# Patient Record
Sex: FEMALE | Race: BLACK OR AFRICAN AMERICAN | NOT HISPANIC OR LATINO | Employment: UNEMPLOYED | URBAN - METROPOLITAN AREA
[De-identification: names, ages, dates, MRNs, and addresses within clinical notes are randomized per-mention and may not be internally consistent; named-entity substitution may affect disease eponyms.]

---

## 2018-09-12 PROBLEM — N39.0 UTI (URINARY TRACT INFECTION): Status: ACTIVE | Noted: 2018-09-12

## 2018-09-12 PROBLEM — R19.7 DIARRHEA: Status: ACTIVE | Noted: 2018-09-12

## 2018-09-12 PROBLEM — I95.9 HYPOTENSION: Status: ACTIVE | Noted: 2018-09-12

## 2018-09-15 PROBLEM — N13.30 HYDRONEPHROSIS: Status: ACTIVE | Noted: 2018-09-15

## 2018-09-15 PROBLEM — I47.10 SVT (SUPRAVENTRICULAR TACHYCARDIA): Status: ACTIVE | Noted: 2018-09-15

## 2018-09-15 PROBLEM — R10.84 GENERALIZED ABDOMINAL PAIN: Status: ACTIVE | Noted: 2018-09-15

## 2018-09-16 PROBLEM — N12 PYELONEPHRITIS: Status: ACTIVE | Noted: 2018-09-16

## 2019-10-15 PROBLEM — K29.70 GASTRITIS: Status: ACTIVE | Noted: 2019-10-15

## 2019-10-16 PROBLEM — R11.2 INTRACTABLE NAUSEA AND VOMITING: Status: ACTIVE | Noted: 2019-10-16

## 2019-10-16 PROBLEM — E11.9 TYPE II DIABETES MELLITUS: Status: ACTIVE | Noted: 2019-10-16

## 2019-10-16 PROBLEM — K80.20 GALLSTONES: Status: ACTIVE | Noted: 2019-10-16

## 2020-02-27 PROBLEM — R11.10 INTRACTABLE VOMITING: Status: ACTIVE | Noted: 2020-02-27

## 2020-02-28 PROBLEM — E10.65 TYPE 1 DIABETES MELLITUS WITH HYPERGLYCEMIA: Status: ACTIVE | Noted: 2020-02-28

## 2020-03-02 PROBLEM — R17 ELEVATED BILIRUBIN: Status: ACTIVE | Noted: 2020-03-02

## 2020-09-10 PROBLEM — E11.01 HYPEROSMOLAR HYPERGLYCEMIC COMA DUE TO DIABETES MELLITUS WITHOUT KETOACIDOSIS: Status: ACTIVE | Noted: 2020-09-10

## 2020-09-10 PROBLEM — E87.6 HYPOKALEMIA: Status: ACTIVE | Noted: 2020-09-10

## 2020-09-10 PROBLEM — E87.1 HYPONATREMIA: Status: ACTIVE | Noted: 2020-09-10

## 2020-09-11 PROBLEM — B96.89 UTI DUE TO KLEBSIELLA SPECIES: Status: ACTIVE | Noted: 2018-09-12

## 2020-09-15 ENCOUNTER — PATIENT OUTREACH (OUTPATIENT)
Dept: ADMINISTRATIVE | Facility: CLINIC | Age: 30
End: 2020-09-15

## 2020-09-15 NOTE — PROGRESS NOTES
C3 nurse attempted to contact patient. No answer. The following message was left for the patient to return the call:  Good afternoon, I am a nurse calling on behalf of [facility] from the [facility].  This is a Transitional Care Call for Tabby Howard. When you have a moment please contact us at [phone] or [phone] Monday through Friday, between the hours of 8 am to 4 pm. We look forward to speaking with you. On behalf of [facility] have a nice day.    The patient does not have a scheduled HOSFU appointment within 7-14 days post hospital discharge date 9/13.

## 2020-12-13 PROBLEM — E87.5 HYPERKALEMIA: Status: ACTIVE | Noted: 2020-12-13

## 2020-12-14 PROBLEM — E83.39 HYPOPHOSPHATEMIA: Status: ACTIVE | Noted: 2020-12-14

## 2020-12-14 PROBLEM — N39.0 UTI (URINARY TRACT INFECTION): Status: ACTIVE | Noted: 2020-12-14

## 2020-12-14 PROBLEM — N17.9 AKI (ACUTE KIDNEY INJURY): Status: ACTIVE | Noted: 2020-12-14

## 2020-12-16 ENCOUNTER — PATIENT OUTREACH (OUTPATIENT)
Dept: ADMINISTRATIVE | Facility: CLINIC | Age: 30
End: 2020-12-16

## 2020-12-16 NOTE — PROGRESS NOTES
C3 nurse attempted to contact patient. No answer. The following message was left for the patient to return the call:  Good morning. I am a nurse calling on behalf of [facility] from the [facility].  This is a Transitional Care Call for Tabby Howard. When you have a moment please contact us at [phone] or [phone] Monday through Friday, between the hours of 8 am to 4 pm. We look forward to speaking with you. On behalf of [facility] have a nice day.    The patient does not have a scheduled HOSFU appointment within 7-14 days post hospital discharge date 12/15/2020. No PCP listed.

## 2021-04-23 ENCOUNTER — HOSPITAL ENCOUNTER (INPATIENT)
Facility: HOSPITAL | Age: 31
LOS: 3 days | Discharge: HOME | DRG: 638 | End: 2021-04-26
Attending: EMERGENCY MEDICINE | Admitting: EMERGENCY MEDICINE
Payer: MEDICAID

## 2021-04-23 DIAGNOSIS — N13.30 HYDRONEPHROSIS OF LEFT KIDNEY: ICD-10-CM

## 2021-04-23 DIAGNOSIS — E11.10 DIABETIC KETOACIDOSIS WITHOUT COMA ASSOCIATED WITH TYPE 2 DIABETES MELLITUS: Primary | ICD-10-CM

## 2021-04-23 DIAGNOSIS — K92.0 HEMATEMESIS, PRESENCE OF NAUSEA NOT SPECIFIED: ICD-10-CM

## 2021-04-23 DIAGNOSIS — R11.2 INTRACTABLE VOMITING WITH NAUSEA, UNSPECIFIED VOMITING TYPE: ICD-10-CM

## 2021-04-23 DIAGNOSIS — R73.9 HYPERGLYCEMIA: ICD-10-CM

## 2021-04-23 PROBLEM — R31.9 HEMATURIA: Status: ACTIVE | Noted: 2021-04-23

## 2021-04-23 LAB
ALBUMIN SERPL BCP-MCNC: 3.2 G/DL (ref 3.5–5.2)
ALLENS TEST: ABNORMAL
ALP SERPL-CCNC: 79 U/L (ref 55–135)
ALT SERPL W/O P-5'-P-CCNC: 15 U/L (ref 10–44)
ANION GAP SERPL CALC-SCNC: 17 MMOL/L (ref 8–16)
AST SERPL-CCNC: 18 U/L (ref 10–40)
B-OH-BUTYR BLD STRIP-SCNC: 6 MMOL/L (ref 0–0.5)
BACTERIA #/AREA URNS AUTO: ABNORMAL /HPF
BASOPHILS # BLD AUTO: 0.04 K/UL (ref 0–0.2)
BASOPHILS NFR BLD: 0.3 % (ref 0–1.9)
BILIRUB SERPL-MCNC: 3 MG/DL (ref 0.1–1)
BILIRUB UR QL STRIP: NEGATIVE
BUN SERPL-MCNC: 20 MG/DL (ref 6–20)
CALCIUM SERPL-MCNC: 9.1 MG/DL (ref 8.7–10.5)
CHLORIDE SERPL-SCNC: 96 MMOL/L (ref 95–110)
CLARITY UR REFRACT.AUTO: CLEAR
CO2 SERPL-SCNC: 21 MMOL/L (ref 23–29)
COLOR UR AUTO: YELLOW
CREAT SERPL-MCNC: 1.4 MG/DL (ref 0.5–1.4)
CTP QC/QA: YES
DIFFERENTIAL METHOD: ABNORMAL
EOSINOPHIL # BLD AUTO: 0 K/UL (ref 0–0.5)
EOSINOPHIL NFR BLD: 0 % (ref 0–8)
ERYTHROCYTE [DISTWIDTH] IN BLOOD BY AUTOMATED COUNT: 13 % (ref 11.5–14.5)
EST. GFR  (AFRICAN AMERICAN): 57.4 ML/MIN/1.73 M^2
EST. GFR  (NON AFRICAN AMERICAN): 49.8 ML/MIN/1.73 M^2
GLUCOSE SERPL-MCNC: 535 MG/DL (ref 70–110)
GLUCOSE UR QL STRIP: ABNORMAL
HCO3 UR-SCNC: 23.3 MMOL/L (ref 24–28)
HCT VFR BLD AUTO: 28.1 % (ref 37–48.5)
HGB BLD-MCNC: 10.1 G/DL (ref 12–16)
HGB UR QL STRIP: ABNORMAL
HYALINE CASTS UR QL AUTO: 0 /LPF
IMM GRANULOCYTES # BLD AUTO: 0.07 K/UL (ref 0–0.04)
IMM GRANULOCYTES NFR BLD AUTO: 0.5 % (ref 0–0.5)
KETONES UR QL STRIP: ABNORMAL
LACTATE SERPL-SCNC: 1.8 MMOL/L (ref 0.5–2.2)
LEUKOCYTE ESTERASE UR QL STRIP: ABNORMAL
LIPASE SERPL-CCNC: 14 U/L (ref 4–60)
LYMPHOCYTES # BLD AUTO: 1.3 K/UL (ref 1–4.8)
LYMPHOCYTES NFR BLD: 10 % (ref 18–48)
MCH RBC QN AUTO: 30.3 PG (ref 27–31)
MCHC RBC AUTO-ENTMCNC: 35.9 G/DL (ref 32–36)
MCV RBC AUTO: 84 FL (ref 82–98)
MICROSCOPIC COMMENT: ABNORMAL
MONOCYTES # BLD AUTO: 0.5 K/UL (ref 0.3–1)
MONOCYTES NFR BLD: 4.1 % (ref 4–15)
NEUTROPHILS # BLD AUTO: 11.1 K/UL (ref 1.8–7.7)
NEUTROPHILS NFR BLD: 85.1 % (ref 38–73)
NITRITE UR QL STRIP: NEGATIVE
NRBC BLD-RTO: 0 /100 WBC
PCO2 BLDA: 32.9 MMHG (ref 35–45)
PH SMN: 7.46 [PH] (ref 7.35–7.45)
PH UR STRIP: 6 [PH] (ref 5–8)
PLATELET # BLD AUTO: 183 K/UL (ref 150–450)
PMV BLD AUTO: 10.8 FL (ref 9.2–12.9)
PO2 BLDA: 24 MMHG (ref 40–60)
POC BE: -1 MMOL/L
POC SATURATED O2: 47 % (ref 95–100)
POC TCO2: 24 MMOL/L (ref 24–29)
POCT GLUCOSE: 359 MG/DL (ref 70–110)
POCT GLUCOSE: 468 MG/DL (ref 70–110)
POCT GLUCOSE: >500 MG/DL (ref 70–110)
POTASSIUM SERPL-SCNC: 4.3 MMOL/L (ref 3.5–5.1)
PROT SERPL-MCNC: 6.5 G/DL (ref 6–8.4)
PROT UR QL STRIP: ABNORMAL
RBC # BLD AUTO: 3.33 M/UL (ref 4–5.4)
RBC #/AREA URNS AUTO: 54 /HPF (ref 0–4)
SAMPLE: ABNORMAL
SARS-COV-2 RDRP RESP QL NAA+PROBE: NEGATIVE
SITE: ABNORMAL
SODIUM SERPL-SCNC: 134 MMOL/L (ref 136–145)
SP GR UR STRIP: 1.02 (ref 1–1.03)
TROPONIN I SERPL DL<=0.01 NG/ML-MCNC: 0.01 NG/ML (ref 0–0.03)
URN SPEC COLLECT METH UR: ABNORMAL
WBC # BLD AUTO: 13.03 K/UL (ref 3.9–12.7)
WBC #/AREA URNS AUTO: 44 /HPF (ref 0–5)
YEAST UR QL AUTO: ABNORMAL

## 2021-04-23 PROCEDURE — 20600001 HC STEP DOWN PRIVATE ROOM

## 2021-04-23 PROCEDURE — 83605 ASSAY OF LACTIC ACID: CPT | Performed by: EMERGENCY MEDICINE

## 2021-04-23 PROCEDURE — 82800 BLOOD PH: CPT

## 2021-04-23 PROCEDURE — 25000003 PHARM REV CODE 250: Performed by: HOSPITALIST

## 2021-04-23 PROCEDURE — 25000003 PHARM REV CODE 250: Performed by: STUDENT IN AN ORGANIZED HEALTH CARE EDUCATION/TRAINING PROGRAM

## 2021-04-23 PROCEDURE — 80307 DRUG TEST PRSMV CHEM ANLYZR: CPT | Performed by: STUDENT IN AN ORGANIZED HEALTH CARE EDUCATION/TRAINING PROGRAM

## 2021-04-23 PROCEDURE — 83690 ASSAY OF LIPASE: CPT | Performed by: STUDENT IN AN ORGANIZED HEALTH CARE EDUCATION/TRAINING PROGRAM

## 2021-04-23 PROCEDURE — 80047 BASIC METABLC PNL IONIZED CA: CPT

## 2021-04-23 PROCEDURE — U0002 COVID-19 LAB TEST NON-CDC: HCPCS | Performed by: STUDENT IN AN ORGANIZED HEALTH CARE EDUCATION/TRAINING PROGRAM

## 2021-04-23 PROCEDURE — 86803 HEPATITIS C AB TEST: CPT | Performed by: EMERGENCY MEDICINE

## 2021-04-23 PROCEDURE — 99223 1ST HOSP IP/OBS HIGH 75: CPT | Mod: ,,, | Performed by: PHYSICIAN ASSISTANT

## 2021-04-23 PROCEDURE — 93005 ELECTROCARDIOGRAM TRACING: CPT

## 2021-04-23 PROCEDURE — 99900035 HC TECH TIME PER 15 MIN (STAT)

## 2021-04-23 PROCEDURE — 63600175 PHARM REV CODE 636 W HCPCS: Performed by: STUDENT IN AN ORGANIZED HEALTH CARE EDUCATION/TRAINING PROGRAM

## 2021-04-23 PROCEDURE — C9113 INJ PANTOPRAZOLE SODIUM, VIA: HCPCS | Performed by: STUDENT IN AN ORGANIZED HEALTH CARE EDUCATION/TRAINING PROGRAM

## 2021-04-23 PROCEDURE — 96375 TX/PRO/DX INJ NEW DRUG ADDON: CPT

## 2021-04-23 PROCEDURE — 86703 HIV-1/HIV-2 1 RESULT ANTBDY: CPT | Performed by: EMERGENCY MEDICINE

## 2021-04-23 PROCEDURE — 99285 EMERGENCY DEPT VISIT HI MDM: CPT | Mod: CR,CS,, | Performed by: EMERGENCY MEDICINE

## 2021-04-23 PROCEDURE — 85025 COMPLETE CBC W/AUTO DIFF WBC: CPT | Performed by: STUDENT IN AN ORGANIZED HEALTH CARE EDUCATION/TRAINING PROGRAM

## 2021-04-23 PROCEDURE — 84484 ASSAY OF TROPONIN QUANT: CPT | Performed by: EMERGENCY MEDICINE

## 2021-04-23 PROCEDURE — P9612 CATHETERIZE FOR URINE SPEC: HCPCS

## 2021-04-23 PROCEDURE — 96365 THER/PROPH/DIAG IV INF INIT: CPT

## 2021-04-23 PROCEDURE — 96361 HYDRATE IV INFUSION ADD-ON: CPT

## 2021-04-23 PROCEDURE — 82248 BILIRUBIN DIRECT: CPT | Performed by: STUDENT IN AN ORGANIZED HEALTH CARE EDUCATION/TRAINING PROGRAM

## 2021-04-23 PROCEDURE — 80053 COMPREHEN METABOLIC PANEL: CPT | Performed by: STUDENT IN AN ORGANIZED HEALTH CARE EDUCATION/TRAINING PROGRAM

## 2021-04-23 PROCEDURE — 84300 ASSAY OF URINE SODIUM: CPT | Performed by: STUDENT IN AN ORGANIZED HEALTH CARE EDUCATION/TRAINING PROGRAM

## 2021-04-23 PROCEDURE — 93010 ELECTROCARDIOGRAM REPORT: CPT | Mod: ,,, | Performed by: STUDENT IN AN ORGANIZED HEALTH CARE EDUCATION/TRAINING PROGRAM

## 2021-04-23 PROCEDURE — 82010 KETONE BODYS QUAN: CPT | Performed by: STUDENT IN AN ORGANIZED HEALTH CARE EDUCATION/TRAINING PROGRAM

## 2021-04-23 PROCEDURE — 82803 BLOOD GASES ANY COMBINATION: CPT

## 2021-04-23 PROCEDURE — 93010 EKG 12-LEAD: ICD-10-PCS | Mod: ,,, | Performed by: STUDENT IN AN ORGANIZED HEALTH CARE EDUCATION/TRAINING PROGRAM

## 2021-04-23 PROCEDURE — 99285 EMERGENCY DEPT VISIT HI MDM: CPT | Mod: 25

## 2021-04-23 PROCEDURE — 82962 GLUCOSE BLOOD TEST: CPT

## 2021-04-23 PROCEDURE — 99285 PR EMERGENCY DEPT VISIT,LEVEL V: ICD-10-PCS | Mod: CR,CS,, | Performed by: EMERGENCY MEDICINE

## 2021-04-23 PROCEDURE — 99223 PR INITIAL HOSPITAL CARE,LEVL III: ICD-10-PCS | Mod: ,,, | Performed by: PHYSICIAN ASSISTANT

## 2021-04-23 PROCEDURE — 87086 URINE CULTURE/COLONY COUNT: CPT | Performed by: STUDENT IN AN ORGANIZED HEALTH CARE EDUCATION/TRAINING PROGRAM

## 2021-04-23 PROCEDURE — 81001 URINALYSIS AUTO W/SCOPE: CPT | Performed by: STUDENT IN AN ORGANIZED HEALTH CARE EDUCATION/TRAINING PROGRAM

## 2021-04-23 RX ORDER — IPRATROPIUM BROMIDE AND ALBUTEROL SULFATE 2.5; .5 MG/3ML; MG/3ML
3 SOLUTION RESPIRATORY (INHALATION) EVERY 4 HOURS PRN
Status: DISCONTINUED | OUTPATIENT
Start: 2021-04-24 | End: 2021-04-26 | Stop reason: HOSPADM

## 2021-04-23 RX ORDER — PANTOPRAZOLE SODIUM 40 MG/10ML
40 INJECTION, POWDER, LYOPHILIZED, FOR SOLUTION INTRAVENOUS
Status: COMPLETED | OUTPATIENT
Start: 2021-04-23 | End: 2021-04-23

## 2021-04-23 RX ORDER — ONDANSETRON 2 MG/ML
8 INJECTION INTRAMUSCULAR; INTRAVENOUS EVERY 8 HOURS PRN
Status: DISCONTINUED | OUTPATIENT
Start: 2021-04-24 | End: 2021-04-26 | Stop reason: HOSPADM

## 2021-04-23 RX ORDER — PANTOPRAZOLE SODIUM 40 MG/1
40 TABLET, DELAYED RELEASE ORAL DAILY
Status: DISCONTINUED | OUTPATIENT
Start: 2021-04-24 | End: 2021-04-26 | Stop reason: HOSPADM

## 2021-04-23 RX ORDER — POLYETHYLENE GLYCOL 3350 17 G/17G
17 POWDER, FOR SOLUTION ORAL 2 TIMES DAILY PRN
Status: DISCONTINUED | OUTPATIENT
Start: 2021-04-24 | End: 2021-04-26 | Stop reason: HOSPADM

## 2021-04-23 RX ORDER — BISACODYL 10 MG
10 SUPPOSITORY, RECTAL RECTAL DAILY PRN
Status: DISCONTINUED | OUTPATIENT
Start: 2021-04-24 | End: 2021-04-26 | Stop reason: HOSPADM

## 2021-04-23 RX ORDER — SODIUM CHLORIDE 9 MG/ML
INJECTION, SOLUTION INTRAVENOUS CONTINUOUS
Status: DISCONTINUED | OUTPATIENT
Start: 2021-04-23 | End: 2021-04-26 | Stop reason: HOSPADM

## 2021-04-23 RX ORDER — HYDRALAZINE HYDROCHLORIDE 25 MG/1
25 TABLET, FILM COATED ORAL EVERY 6 HOURS PRN
Status: DISCONTINUED | OUTPATIENT
Start: 2021-04-23 | End: 2021-04-26 | Stop reason: HOSPADM

## 2021-04-23 RX ORDER — ACETAMINOPHEN 325 MG/1
650 TABLET ORAL EVERY 4 HOURS PRN
Status: DISCONTINUED | OUTPATIENT
Start: 2021-04-24 | End: 2021-04-24

## 2021-04-23 RX ORDER — DEXTROSE MONOHYDRATE 100 MG/ML
INJECTION, SOLUTION INTRAVENOUS
Status: DISCONTINUED | OUTPATIENT
Start: 2021-04-23 | End: 2021-04-26 | Stop reason: HOSPADM

## 2021-04-23 RX ORDER — SODIUM CHLORIDE 0.9 % (FLUSH) 0.9 %
10 SYRINGE (ML) INJECTION
Status: CANCELLED | OUTPATIENT
Start: 2021-04-23

## 2021-04-23 RX ORDER — OXYCODONE HYDROCHLORIDE 5 MG/1
5 TABLET ORAL ONCE
Status: COMPLETED | OUTPATIENT
Start: 2021-04-24 | End: 2021-04-23

## 2021-04-23 RX ORDER — TALC
6 POWDER (GRAM) TOPICAL NIGHTLY PRN
Status: CANCELLED | OUTPATIENT
Start: 2021-04-23

## 2021-04-23 RX ORDER — TALC
9 POWDER (GRAM) TOPICAL NIGHTLY PRN
Status: DISCONTINUED | OUTPATIENT
Start: 2021-04-24 | End: 2021-04-26 | Stop reason: HOSPADM

## 2021-04-23 RX ORDER — ONDANSETRON 2 MG/ML
4 INJECTION INTRAMUSCULAR; INTRAVENOUS EVERY 8 HOURS PRN
Status: DISCONTINUED | OUTPATIENT
Start: 2021-04-24 | End: 2021-04-23

## 2021-04-23 RX ORDER — ACETAMINOPHEN 325 MG/1
650 TABLET ORAL EVERY 6 HOURS PRN
Status: DISCONTINUED | OUTPATIENT
Start: 2021-04-23 | End: 2021-04-26 | Stop reason: HOSPADM

## 2021-04-23 RX ORDER — SODIUM CHLORIDE 0.9 % (FLUSH) 0.9 %
10 SYRINGE (ML) INJECTION
Status: DISCONTINUED | OUTPATIENT
Start: 2021-04-23 | End: 2021-04-26 | Stop reason: HOSPADM

## 2021-04-23 RX ORDER — SODIUM CHLORIDE AND POTASSIUM CHLORIDE 150; 900 MG/100ML; MG/100ML
INJECTION, SOLUTION INTRAVENOUS
Status: COMPLETED | OUTPATIENT
Start: 2021-04-23 | End: 2021-04-23

## 2021-04-23 RX ORDER — ONDANSETRON 2 MG/ML
4 INJECTION INTRAMUSCULAR; INTRAVENOUS
Status: COMPLETED | OUTPATIENT
Start: 2021-04-23 | End: 2021-04-23

## 2021-04-23 RX ADMIN — ACETAMINOPHEN 650 MG: 325 TABLET ORAL at 10:04

## 2021-04-23 RX ADMIN — OXYCODONE 5 MG: 5 TABLET ORAL at 10:04

## 2021-04-23 RX ADMIN — SODIUM CHLORIDE 150 ML/HR: 0.9 INJECTION, SOLUTION INTRAVENOUS at 11:04

## 2021-04-23 RX ADMIN — SODIUM CHLORIDE AND POTASSIUM CHLORIDE: .9; .15 SOLUTION INTRAVENOUS at 08:04

## 2021-04-23 RX ADMIN — SODIUM CHLORIDE 3 UNITS/HR: 9 INJECTION, SOLUTION INTRAVENOUS at 08:04

## 2021-04-23 RX ADMIN — PANTOPRAZOLE SODIUM 40 MG: 40 INJECTION, POWDER, FOR SOLUTION INTRAVENOUS at 07:04

## 2021-04-23 RX ADMIN — ONDANSETRON 4 MG: 2 INJECTION INTRAMUSCULAR; INTRAVENOUS at 07:04

## 2021-04-23 RX ADMIN — SODIUM CHLORIDE 1000 ML: 0.9 INJECTION, SOLUTION INTRAVENOUS at 07:04

## 2021-04-24 PROBLEM — I16.0 HYPERTENSIVE URGENCY: Status: ACTIVE | Noted: 2021-04-24

## 2021-04-24 PROBLEM — D64.9 ANEMIA: Status: ACTIVE | Noted: 2021-04-24

## 2021-04-24 LAB
ABO + RH BLD: NORMAL
ALBUMIN SERPL BCP-MCNC: 2.8 G/DL (ref 3.5–5.2)
ALP SERPL-CCNC: 78 U/L (ref 55–135)
ALT SERPL W/O P-5'-P-CCNC: 15 U/L (ref 10–44)
AMPHET+METHAMPHET UR QL: NEGATIVE
ANION GAP SERPL CALC-SCNC: 10 MMOL/L (ref 8–16)
ANION GAP SERPL CALC-SCNC: 5 MMOL/L (ref 8–16)
ANION GAP SERPL CALC-SCNC: 8 MMOL/L (ref 8–16)
ANION GAP SERPL CALC-SCNC: 8 MMOL/L (ref 8–16)
ANION GAP SERPL CALC-SCNC: 9 MMOL/L (ref 8–16)
AST SERPL-CCNC: 18 U/L (ref 10–40)
BACTERIA #/AREA URNS AUTO: NORMAL /HPF
BARBITURATES UR QL SCN>200 NG/ML: NEGATIVE
BASOPHILS # BLD AUTO: 0.03 K/UL (ref 0–0.2)
BASOPHILS NFR BLD: 0.3 % (ref 0–1.9)
BENZODIAZ UR QL SCN>200 NG/ML: NEGATIVE
BILIRUB DIRECT SERPL-MCNC: 0.8 MG/DL (ref 0.1–0.3)
BILIRUB SERPL-MCNC: 2.4 MG/DL (ref 0.1–1)
BILIRUB UR QL STRIP: NEGATIVE
BLD GP AB SCN CELLS X3 SERPL QL: NORMAL
BUN SERPL-MCNC: 10 MG/DL (ref 6–20)
BUN SERPL-MCNC: 11 MG/DL (ref 6–20)
BUN SERPL-MCNC: 13 MG/DL (ref 6–20)
BUN SERPL-MCNC: 14 MG/DL (ref 6–20)
BUN SERPL-MCNC: 15 MG/DL (ref 6–20)
BZE UR QL SCN: NEGATIVE
CALCIUM SERPL-MCNC: 7.8 MG/DL (ref 8.7–10.5)
CALCIUM SERPL-MCNC: 8.2 MG/DL (ref 8.7–10.5)
CALCIUM SERPL-MCNC: 8.3 MG/DL (ref 8.7–10.5)
CALCIUM SERPL-MCNC: 8.4 MG/DL (ref 8.7–10.5)
CALCIUM SERPL-MCNC: 8.4 MG/DL (ref 8.7–10.5)
CALCIUM SERPL-MCNC: 8.5 MG/DL (ref 8.7–10.5)
CALCIUM SERPL-MCNC: 8.6 MG/DL (ref 8.7–10.5)
CANNABINOIDS UR QL SCN: NORMAL
CHLORIDE SERPL-SCNC: 105 MMOL/L (ref 95–110)
CHLORIDE SERPL-SCNC: 105 MMOL/L (ref 95–110)
CHLORIDE SERPL-SCNC: 106 MMOL/L (ref 95–110)
CHLORIDE SERPL-SCNC: 106 MMOL/L (ref 95–110)
CHLORIDE SERPL-SCNC: 107 MMOL/L (ref 95–110)
CHLORIDE SERPL-SCNC: 107 MMOL/L (ref 95–110)
CHLORIDE SERPL-SCNC: 108 MMOL/L (ref 95–110)
CLARITY UR REFRACT.AUTO: CLEAR
CO2 SERPL-SCNC: 22 MMOL/L (ref 23–29)
CO2 SERPL-SCNC: 23 MMOL/L (ref 23–29)
CO2 SERPL-SCNC: 24 MMOL/L (ref 23–29)
CO2 SERPL-SCNC: 26 MMOL/L (ref 23–29)
CO2 SERPL-SCNC: 26 MMOL/L (ref 23–29)
CO2 SERPL-SCNC: 27 MMOL/L (ref 23–29)
CO2 SERPL-SCNC: 27 MMOL/L (ref 23–29)
COLOR UR AUTO: YELLOW
CREAT SERPL-MCNC: 0.8 MG/DL (ref 0.5–1.4)
CREAT SERPL-MCNC: 0.8 MG/DL (ref 0.5–1.4)
CREAT SERPL-MCNC: 0.9 MG/DL (ref 0.5–1.4)
CREAT SERPL-MCNC: 1 MG/DL (ref 0.5–1.4)
CREAT UR-MCNC: 46 MG/DL (ref 15–325)
DIFFERENTIAL METHOD: ABNORMAL
EOSINOPHIL # BLD AUTO: 0 K/UL (ref 0–0.5)
EOSINOPHIL NFR BLD: 0.2 % (ref 0–8)
ERYTHROCYTE [DISTWIDTH] IN BLOOD BY AUTOMATED COUNT: 12.9 % (ref 11.5–14.5)
EST. GFR  (AFRICAN AMERICAN): >60 ML/MIN/1.73 M^2
EST. GFR  (NON AFRICAN AMERICAN): >60 ML/MIN/1.73 M^2
ESTIMATED AVG GLUCOSE: 197 MG/DL (ref 68–131)
GLUCOSE SERPL-MCNC: 110 MG/DL (ref 70–110)
GLUCOSE SERPL-MCNC: 150 MG/DL (ref 70–110)
GLUCOSE SERPL-MCNC: 164 MG/DL (ref 70–110)
GLUCOSE SERPL-MCNC: 242 MG/DL (ref 70–110)
GLUCOSE SERPL-MCNC: 248 MG/DL (ref 70–110)
GLUCOSE SERPL-MCNC: 249 MG/DL (ref 70–110)
GLUCOSE SERPL-MCNC: 252 MG/DL (ref 70–110)
GLUCOSE UR QL STRIP: ABNORMAL
HBA1C MFR BLD: 8.5 % (ref 4–5.6)
HCT VFR BLD AUTO: 26 % (ref 37–48.5)
HCT VFR BLD AUTO: 26.8 % (ref 37–48.5)
HGB BLD-MCNC: 9.5 G/DL (ref 12–16)
HGB BLD-MCNC: 9.6 G/DL (ref 12–16)
HGB UR QL STRIP: ABNORMAL
HYALINE CASTS UR QL AUTO: 0 /LPF
IMM GRANULOCYTES # BLD AUTO: 0.03 K/UL (ref 0–0.04)
IMM GRANULOCYTES NFR BLD AUTO: 0.3 % (ref 0–0.5)
KETONES UR QL STRIP: ABNORMAL
LACTATE SERPL-SCNC: 1.4 MMOL/L (ref 0.5–2.2)
LEUKOCYTE ESTERASE UR QL STRIP: ABNORMAL
LIPASE SERPL-CCNC: 17 U/L (ref 4–60)
LYMPHOCYTES # BLD AUTO: 2 K/UL (ref 1–4.8)
LYMPHOCYTES NFR BLD: 17.7 % (ref 18–48)
MAGNESIUM SERPL-MCNC: 1.7 MG/DL (ref 1.6–2.6)
MCH RBC QN AUTO: 29.9 PG (ref 27–31)
MCHC RBC AUTO-ENTMCNC: 35.8 G/DL (ref 32–36)
MCV RBC AUTO: 84 FL (ref 82–98)
METHADONE UR QL SCN>300 NG/ML: NEGATIVE
MICROSCOPIC COMMENT: NORMAL
MONOCYTES # BLD AUTO: 0.5 K/UL (ref 0.3–1)
MONOCYTES NFR BLD: 4.9 % (ref 4–15)
NEUTROPHILS # BLD AUTO: 8.5 K/UL (ref 1.8–7.7)
NEUTROPHILS NFR BLD: 76.6 % (ref 38–73)
NITRITE UR QL STRIP: NEGATIVE
NRBC BLD-RTO: 0 /100 WBC
OPIATES UR QL SCN: NEGATIVE
PCP UR QL SCN>25 NG/ML: NEGATIVE
PH UR STRIP: 6 [PH] (ref 5–8)
PHOSPHATE SERPL-MCNC: 2.7 MG/DL (ref 2.7–4.5)
PLATELET # BLD AUTO: 168 K/UL (ref 150–450)
PMV BLD AUTO: 10.2 FL (ref 9.2–12.9)
POCT GLUCOSE: 151 MG/DL (ref 70–110)
POCT GLUCOSE: 165 MG/DL (ref 70–110)
POCT GLUCOSE: 186 MG/DL (ref 70–110)
POCT GLUCOSE: 199 MG/DL (ref 70–110)
POCT GLUCOSE: 207 MG/DL (ref 70–110)
POCT GLUCOSE: 212 MG/DL (ref 70–110)
POCT GLUCOSE: 217 MG/DL (ref 70–110)
POCT GLUCOSE: 234 MG/DL (ref 70–110)
POCT GLUCOSE: 235 MG/DL (ref 70–110)
POCT GLUCOSE: 248 MG/DL (ref 70–110)
POCT GLUCOSE: 292 MG/DL (ref 70–110)
POCT GLUCOSE: 312 MG/DL (ref 70–110)
POCT GLUCOSE: 396 MG/DL (ref 70–110)
POTASSIUM SERPL-SCNC: 3.5 MMOL/L (ref 3.5–5.1)
POTASSIUM SERPL-SCNC: 3.7 MMOL/L (ref 3.5–5.1)
POTASSIUM SERPL-SCNC: 3.8 MMOL/L (ref 3.5–5.1)
POTASSIUM SERPL-SCNC: 4 MMOL/L (ref 3.5–5.1)
POTASSIUM SERPL-SCNC: 4.3 MMOL/L (ref 3.5–5.1)
PROT SERPL-MCNC: 5.9 G/DL (ref 6–8.4)
PROT UR QL STRIP: ABNORMAL
RBC # BLD AUTO: 3.21 M/UL (ref 4–5.4)
RBC #/AREA URNS AUTO: 2 /HPF (ref 0–4)
SODIUM SERPL-SCNC: 138 MMOL/L (ref 136–145)
SODIUM SERPL-SCNC: 138 MMOL/L (ref 136–145)
SODIUM SERPL-SCNC: 140 MMOL/L (ref 136–145)
SODIUM SERPL-SCNC: 141 MMOL/L (ref 136–145)
SODIUM UR-SCNC: 40 MMOL/L (ref 20–250)
SP GR UR STRIP: 1.01 (ref 1–1.03)
SQUAMOUS #/AREA URNS AUTO: 0 /HPF
TOXICOLOGY INFORMATION: NORMAL
URN SPEC COLLECT METH UR: ABNORMAL
WBC # BLD AUTO: 11.02 K/UL (ref 3.9–12.7)
WBC #/AREA URNS AUTO: 5 /HPF (ref 0–5)
YEAST UR QL AUTO: NORMAL

## 2021-04-24 PROCEDURE — 99233 PR SUBSEQUENT HOSPITAL CARE,LEVL III: ICD-10-PCS | Mod: ,,, | Performed by: HOSPITALIST

## 2021-04-24 PROCEDURE — 85018 HEMOGLOBIN: CPT | Performed by: PHYSICIAN ASSISTANT

## 2021-04-24 PROCEDURE — 88112 CYTOPATH CELL ENHANCE TECH: CPT | Performed by: PATHOLOGY

## 2021-04-24 PROCEDURE — 88112 CYTOPATH CELL ENHANCE TECH: CPT | Mod: 26,,, | Performed by: PATHOLOGY

## 2021-04-24 PROCEDURE — 80053 COMPREHEN METABOLIC PANEL: CPT | Performed by: PHYSICIAN ASSISTANT

## 2021-04-24 PROCEDURE — 25000003 PHARM REV CODE 250: Performed by: HOSPITALIST

## 2021-04-24 PROCEDURE — 85025 COMPLETE CBC W/AUTO DIFF WBC: CPT | Performed by: PHYSICIAN ASSISTANT

## 2021-04-24 PROCEDURE — 63600175 PHARM REV CODE 636 W HCPCS: Performed by: HOSPITALIST

## 2021-04-24 PROCEDURE — C9399 UNCLASSIFIED DRUGS OR BIOLOG: HCPCS | Performed by: HOSPITALIST

## 2021-04-24 PROCEDURE — 63600175 PHARM REV CODE 636 W HCPCS: Performed by: STUDENT IN AN ORGANIZED HEALTH CARE EDUCATION/TRAINING PROGRAM

## 2021-04-24 PROCEDURE — 25500020 PHARM REV CODE 255: Performed by: HOSPITALIST

## 2021-04-24 PROCEDURE — 83036 HEMOGLOBIN GLYCOSYLATED A1C: CPT | Performed by: PHYSICIAN ASSISTANT

## 2021-04-24 PROCEDURE — 86900 BLOOD TYPING SEROLOGIC ABO: CPT | Performed by: PHYSICIAN ASSISTANT

## 2021-04-24 PROCEDURE — 81001 URINALYSIS AUTO W/SCOPE: CPT | Performed by: HOSPITALIST

## 2021-04-24 PROCEDURE — 25000003 PHARM REV CODE 250: Performed by: PHYSICIAN ASSISTANT

## 2021-04-24 PROCEDURE — 36415 COLL VENOUS BLD VENIPUNCTURE: CPT | Performed by: HOSPITALIST

## 2021-04-24 PROCEDURE — 84100 ASSAY OF PHOSPHORUS: CPT | Performed by: PHYSICIAN ASSISTANT

## 2021-04-24 PROCEDURE — 88112 PR  CYTOPATH, CELL ENHANCE TECH: ICD-10-PCS | Mod: 26,,, | Performed by: PATHOLOGY

## 2021-04-24 PROCEDURE — 99233 SBSQ HOSP IP/OBS HIGH 50: CPT | Mod: ,,, | Performed by: HOSPITALIST

## 2021-04-24 PROCEDURE — 85014 HEMATOCRIT: CPT | Performed by: PHYSICIAN ASSISTANT

## 2021-04-24 PROCEDURE — 20600001 HC STEP DOWN PRIVATE ROOM

## 2021-04-24 PROCEDURE — 25000003 PHARM REV CODE 250: Performed by: STUDENT IN AN ORGANIZED HEALTH CARE EDUCATION/TRAINING PROGRAM

## 2021-04-24 PROCEDURE — 87040 BLOOD CULTURE FOR BACTERIA: CPT | Performed by: HOSPITALIST

## 2021-04-24 PROCEDURE — 36415 COLL VENOUS BLD VENIPUNCTURE: CPT | Performed by: PHYSICIAN ASSISTANT

## 2021-04-24 PROCEDURE — 83690 ASSAY OF LIPASE: CPT | Performed by: HOSPITALIST

## 2021-04-24 PROCEDURE — 83735 ASSAY OF MAGNESIUM: CPT | Performed by: PHYSICIAN ASSISTANT

## 2021-04-24 PROCEDURE — 80048 BASIC METABOLIC PNL TOTAL CA: CPT | Mod: 91 | Performed by: PHYSICIAN ASSISTANT

## 2021-04-24 PROCEDURE — 80048 BASIC METABOLIC PNL TOTAL CA: CPT | Performed by: PHYSICIAN ASSISTANT

## 2021-04-24 PROCEDURE — 83605 ASSAY OF LACTIC ACID: CPT | Performed by: HOSPITALIST

## 2021-04-24 RX ORDER — MORPHINE SULFATE 2 MG/ML
2 INJECTION, SOLUTION INTRAMUSCULAR; INTRAVENOUS EVERY 4 HOURS PRN
Status: DISCONTINUED | OUTPATIENT
Start: 2021-04-24 | End: 2021-04-26 | Stop reason: HOSPADM

## 2021-04-24 RX ORDER — MAGNESIUM SULFATE HEPTAHYDRATE 40 MG/ML
2 INJECTION, SOLUTION INTRAVENOUS ONCE
Status: COMPLETED | OUTPATIENT
Start: 2021-04-24 | End: 2021-04-24

## 2021-04-24 RX ORDER — POTASSIUM CHLORIDE 20 MEQ/1
40 TABLET, EXTENDED RELEASE ORAL 3 TIMES DAILY
Status: COMPLETED | OUTPATIENT
Start: 2021-04-24 | End: 2021-04-24

## 2021-04-24 RX ORDER — ONDANSETRON 4 MG/1
4 TABLET, FILM COATED ORAL EVERY 6 HOURS PRN
Status: DISCONTINUED | OUTPATIENT
Start: 2021-04-24 | End: 2021-04-26 | Stop reason: HOSPADM

## 2021-04-24 RX ORDER — OXYCODONE HYDROCHLORIDE 5 MG/1
5 TABLET ORAL EVERY 4 HOURS PRN
Status: DISCONTINUED | OUTPATIENT
Start: 2021-04-24 | End: 2021-04-26 | Stop reason: HOSPADM

## 2021-04-24 RX ORDER — INSULIN ASPART 100 [IU]/ML
8 INJECTION, SOLUTION INTRAVENOUS; SUBCUTANEOUS
Status: DISCONTINUED | OUTPATIENT
Start: 2021-04-24 | End: 2021-04-26 | Stop reason: HOSPADM

## 2021-04-24 RX ADMIN — INSULIN DETEMIR 14 UNITS: 100 INJECTION, SOLUTION SUBCUTANEOUS at 09:04

## 2021-04-24 RX ADMIN — SODIUM CHLORIDE: 0.9 INJECTION, SOLUTION INTRAVENOUS at 08:04

## 2021-04-24 RX ADMIN — MORPHINE SULFATE 2 MG: 2 INJECTION, SOLUTION INTRAMUSCULAR; INTRAVENOUS at 06:04

## 2021-04-24 RX ADMIN — MORPHINE SULFATE 2 MG: 2 INJECTION, SOLUTION INTRAMUSCULAR; INTRAVENOUS at 10:04

## 2021-04-24 RX ADMIN — HYDRALAZINE HYDROCHLORIDE 25 MG: 25 TABLET, FILM COATED ORAL at 08:04

## 2021-04-24 RX ADMIN — ACETAMINOPHEN 650 MG: 325 TABLET ORAL at 08:04

## 2021-04-24 RX ADMIN — MORPHINE SULFATE 2 MG: 2 INJECTION, SOLUTION INTRAMUSCULAR; INTRAVENOUS at 02:04

## 2021-04-24 RX ADMIN — MAGNESIUM SULFATE 2 G: 2 INJECTION INTRAVENOUS at 08:04

## 2021-04-24 RX ADMIN — ACETAMINOPHEN 650 MG: 325 TABLET ORAL at 04:04

## 2021-04-24 RX ADMIN — MORPHINE SULFATE 2 MG: 2 INJECTION, SOLUTION INTRAMUSCULAR; INTRAVENOUS at 11:04

## 2021-04-24 RX ADMIN — ACETAMINOPHEN 650 MG: 325 TABLET ORAL at 09:04

## 2021-04-24 RX ADMIN — PANTOPRAZOLE SODIUM 40 MG: 40 TABLET, DELAYED RELEASE ORAL at 09:04

## 2021-04-24 RX ADMIN — SODIUM CHLORIDE 0.88 UNITS/HR: 9 INJECTION, SOLUTION INTRAVENOUS at 09:04

## 2021-04-24 RX ADMIN — IOHEXOL 75 ML: 350 INJECTION, SOLUTION INTRAVENOUS at 06:04

## 2021-04-24 RX ADMIN — INSULIN DETEMIR 14 UNITS: 100 INJECTION, SOLUTION SUBCUTANEOUS at 08:04

## 2021-04-24 RX ADMIN — INSULIN ASPART 8 UNITS: 100 INJECTION, SOLUTION INTRAVENOUS; SUBCUTANEOUS at 05:04

## 2021-04-24 RX ADMIN — POTASSIUM CHLORIDE 40 MEQ: 1500 TABLET, EXTENDED RELEASE ORAL at 09:04

## 2021-04-24 RX ADMIN — SODIUM CHLORIDE 0.88 UNITS/HR: 9 INJECTION, SOLUTION INTRAVENOUS at 05:04

## 2021-04-24 RX ADMIN — INSULIN ASPART 8 UNITS: 100 INJECTION, SOLUTION INTRAVENOUS; SUBCUTANEOUS at 11:04

## 2021-04-24 RX ADMIN — POTASSIUM CHLORIDE 40 MEQ: 1500 TABLET, EXTENDED RELEASE ORAL at 04:04

## 2021-04-25 LAB
ALBUMIN SERPL BCP-MCNC: 2.3 G/DL (ref 3.5–5.2)
ALP SERPL-CCNC: 64 U/L (ref 55–135)
ALT SERPL W/O P-5'-P-CCNC: 11 U/L (ref 10–44)
ANION GAP SERPL CALC-SCNC: 4 MMOL/L (ref 8–16)
ANION GAP SERPL CALC-SCNC: 4 MMOL/L (ref 8–16)
ANION GAP SERPL CALC-SCNC: 6 MMOL/L (ref 8–16)
ANION GAP SERPL CALC-SCNC: 7 MMOL/L (ref 8–16)
ANION GAP SERPL CALC-SCNC: 7 MMOL/L (ref 8–16)
AST SERPL-CCNC: 16 U/L (ref 10–40)
BACTERIA UR CULT: NORMAL
BASOPHILS # BLD AUTO: 0.04 K/UL (ref 0–0.2)
BASOPHILS NFR BLD: 0.5 % (ref 0–1.9)
BILIRUB SERPL-MCNC: 1.4 MG/DL (ref 0.1–1)
BUN SERPL-MCNC: 8 MG/DL (ref 6–20)
BUN SERPL-MCNC: 9 MG/DL (ref 6–20)
BUN SERPL-MCNC: 9 MG/DL (ref 6–20)
CALCIUM SERPL-MCNC: 7.5 MG/DL (ref 8.7–10.5)
CALCIUM SERPL-MCNC: 7.5 MG/DL (ref 8.7–10.5)
CALCIUM SERPL-MCNC: 7.7 MG/DL (ref 8.7–10.5)
CHLORIDE SERPL-SCNC: 107 MMOL/L (ref 95–110)
CHLORIDE SERPL-SCNC: 109 MMOL/L (ref 95–110)
CHLORIDE SERPL-SCNC: 110 MMOL/L (ref 95–110)
CO2 SERPL-SCNC: 21 MMOL/L (ref 23–29)
CO2 SERPL-SCNC: 22 MMOL/L (ref 23–29)
CO2 SERPL-SCNC: 23 MMOL/L (ref 23–29)
CO2 SERPL-SCNC: 25 MMOL/L (ref 23–29)
CO2 SERPL-SCNC: 25 MMOL/L (ref 23–29)
CREAT SERPL-MCNC: 0.8 MG/DL (ref 0.5–1.4)
CREAT SERPL-MCNC: 0.9 MG/DL (ref 0.5–1.4)
DIFFERENTIAL METHOD: ABNORMAL
EOSINOPHIL # BLD AUTO: 0.1 K/UL (ref 0–0.5)
EOSINOPHIL NFR BLD: 1.1 % (ref 0–8)
ERYTHROCYTE [DISTWIDTH] IN BLOOD BY AUTOMATED COUNT: 13 % (ref 11.5–14.5)
EST. GFR  (AFRICAN AMERICAN): >60 ML/MIN/1.73 M^2
EST. GFR  (NON AFRICAN AMERICAN): >60 ML/MIN/1.73 M^2
GLUCOSE SERPL-MCNC: 116 MG/DL (ref 70–110)
GLUCOSE SERPL-MCNC: 123 MG/DL (ref 70–110)
GLUCOSE SERPL-MCNC: 153 MG/DL (ref 70–110)
GLUCOSE SERPL-MCNC: 181 MG/DL (ref 70–110)
GLUCOSE SERPL-MCNC: 90 MG/DL (ref 70–110)
HCT VFR BLD AUTO: 25 % (ref 37–48.5)
HGB BLD-MCNC: 8.9 G/DL (ref 12–16)
IMM GRANULOCYTES # BLD AUTO: 0.03 K/UL (ref 0–0.04)
IMM GRANULOCYTES NFR BLD AUTO: 0.4 % (ref 0–0.5)
LYMPHOCYTES # BLD AUTO: 2.9 K/UL (ref 1–4.8)
LYMPHOCYTES NFR BLD: 34.6 % (ref 18–48)
MAGNESIUM SERPL-MCNC: 1.8 MG/DL (ref 1.6–2.6)
MCH RBC QN AUTO: 29.6 PG (ref 27–31)
MCHC RBC AUTO-ENTMCNC: 35.6 G/DL (ref 32–36)
MCV RBC AUTO: 83 FL (ref 82–98)
MONOCYTES # BLD AUTO: 0.5 K/UL (ref 0.3–1)
MONOCYTES NFR BLD: 6.4 % (ref 4–15)
NEUTROPHILS # BLD AUTO: 4.8 K/UL (ref 1.8–7.7)
NEUTROPHILS NFR BLD: 57 % (ref 38–73)
NRBC BLD-RTO: 0 /100 WBC
PHOSPHATE SERPL-MCNC: 2 MG/DL (ref 2.7–4.5)
PLATELET # BLD AUTO: 160 K/UL (ref 150–450)
PMV BLD AUTO: 10.4 FL (ref 9.2–12.9)
POCT GLUCOSE: 152 MG/DL (ref 70–110)
POCT GLUCOSE: 169 MG/DL (ref 70–110)
POCT GLUCOSE: 174 MG/DL (ref 70–110)
POCT GLUCOSE: 180 MG/DL (ref 70–110)
POCT GLUCOSE: 236 MG/DL (ref 70–110)
POTASSIUM SERPL-SCNC: 3.6 MMOL/L (ref 3.5–5.1)
POTASSIUM SERPL-SCNC: 3.8 MMOL/L (ref 3.5–5.1)
POTASSIUM SERPL-SCNC: 3.9 MMOL/L (ref 3.5–5.1)
POTASSIUM SERPL-SCNC: 4.2 MMOL/L (ref 3.5–5.1)
POTASSIUM SERPL-SCNC: 4.3 MMOL/L (ref 3.5–5.1)
PROT SERPL-MCNC: 5 G/DL (ref 6–8.4)
RBC # BLD AUTO: 3.01 M/UL (ref 4–5.4)
SODIUM SERPL-SCNC: 136 MMOL/L (ref 136–145)
SODIUM SERPL-SCNC: 138 MMOL/L (ref 136–145)
SODIUM SERPL-SCNC: 140 MMOL/L (ref 136–145)
WBC # BLD AUTO: 8.39 K/UL (ref 3.9–12.7)

## 2021-04-25 PROCEDURE — 25000003 PHARM REV CODE 250: Performed by: HOSPITALIST

## 2021-04-25 PROCEDURE — 25000003 PHARM REV CODE 250: Performed by: STUDENT IN AN ORGANIZED HEALTH CARE EDUCATION/TRAINING PROGRAM

## 2021-04-25 PROCEDURE — 80053 COMPREHEN METABOLIC PANEL: CPT | Performed by: PHYSICIAN ASSISTANT

## 2021-04-25 PROCEDURE — 84100 ASSAY OF PHOSPHORUS: CPT | Performed by: PHYSICIAN ASSISTANT

## 2021-04-25 PROCEDURE — 85025 COMPLETE CBC W/AUTO DIFF WBC: CPT | Performed by: PHYSICIAN ASSISTANT

## 2021-04-25 PROCEDURE — 99233 SBSQ HOSP IP/OBS HIGH 50: CPT | Mod: ,,, | Performed by: HOSPITALIST

## 2021-04-25 PROCEDURE — 83735 ASSAY OF MAGNESIUM: CPT | Performed by: PHYSICIAN ASSISTANT

## 2021-04-25 PROCEDURE — 20600001 HC STEP DOWN PRIVATE ROOM

## 2021-04-25 PROCEDURE — 36415 COLL VENOUS BLD VENIPUNCTURE: CPT | Performed by: PHYSICIAN ASSISTANT

## 2021-04-25 PROCEDURE — A4216 STERILE WATER/SALINE, 10 ML: HCPCS | Performed by: STUDENT IN AN ORGANIZED HEALTH CARE EDUCATION/TRAINING PROGRAM

## 2021-04-25 PROCEDURE — 80048 BASIC METABOLIC PNL TOTAL CA: CPT | Mod: 91 | Performed by: PHYSICIAN ASSISTANT

## 2021-04-25 PROCEDURE — 99233 PR SUBSEQUENT HOSPITAL CARE,LEVL III: ICD-10-PCS | Mod: ,,, | Performed by: HOSPITALIST

## 2021-04-25 PROCEDURE — C9399 UNCLASSIFIED DRUGS OR BIOLOG: HCPCS | Performed by: HOSPITALIST

## 2021-04-25 PROCEDURE — 25000003 PHARM REV CODE 250: Performed by: PHYSICIAN ASSISTANT

## 2021-04-25 PROCEDURE — 63600175 PHARM REV CODE 636 W HCPCS: Performed by: HOSPITALIST

## 2021-04-25 RX ORDER — GLUCAGON 1 MG
1 KIT INJECTION
Status: DISCONTINUED | OUTPATIENT
Start: 2021-04-25 | End: 2021-04-26 | Stop reason: HOSPADM

## 2021-04-25 RX ORDER — INSULIN ASPART 100 [IU]/ML
1-10 INJECTION, SOLUTION INTRAVENOUS; SUBCUTANEOUS
Status: DISCONTINUED | OUTPATIENT
Start: 2021-04-25 | End: 2021-04-26 | Stop reason: HOSPADM

## 2021-04-25 RX ORDER — IBUPROFEN 200 MG
24 TABLET ORAL
Status: DISCONTINUED | OUTPATIENT
Start: 2021-04-25 | End: 2021-04-26 | Stop reason: HOSPADM

## 2021-04-25 RX ORDER — IBUPROFEN 200 MG
16 TABLET ORAL
Status: DISCONTINUED | OUTPATIENT
Start: 2021-04-25 | End: 2021-04-26 | Stop reason: HOSPADM

## 2021-04-25 RX ADMIN — SODIUM CHLORIDE: 0.9 INJECTION, SOLUTION INTRAVENOUS at 11:04

## 2021-04-25 RX ADMIN — PANTOPRAZOLE SODIUM 40 MG: 40 TABLET, DELAYED RELEASE ORAL at 08:04

## 2021-04-25 RX ADMIN — INSULIN DETEMIR 14 UNITS: 100 INJECTION, SOLUTION SUBCUTANEOUS at 09:04

## 2021-04-25 RX ADMIN — Medication 10 ML: at 08:04

## 2021-04-25 RX ADMIN — SODIUM CHLORIDE: 0.9 INJECTION, SOLUTION INTRAVENOUS at 09:04

## 2021-04-25 RX ADMIN — INSULIN ASPART 8 UNITS: 100 INJECTION, SOLUTION INTRAVENOUS; SUBCUTANEOUS at 07:04

## 2021-04-25 RX ADMIN — INSULIN DETEMIR 14 UNITS: 100 INJECTION, SOLUTION SUBCUTANEOUS at 08:04

## 2021-04-25 RX ADMIN — SODIUM CHLORIDE: 0.9 INJECTION, SOLUTION INTRAVENOUS at 03:04

## 2021-04-25 RX ADMIN — INSULIN ASPART 8 UNITS: 100 INJECTION, SOLUTION INTRAVENOUS; SUBCUTANEOUS at 05:04

## 2021-04-25 RX ADMIN — MORPHINE SULFATE 2 MG: 2 INJECTION, SOLUTION INTRAMUSCULAR; INTRAVENOUS at 05:04

## 2021-04-25 RX ADMIN — MORPHINE SULFATE 2 MG: 2 INJECTION, SOLUTION INTRAMUSCULAR; INTRAVENOUS at 09:04

## 2021-04-25 RX ADMIN — MORPHINE SULFATE 2 MG: 2 INJECTION, SOLUTION INTRAMUSCULAR; INTRAVENOUS at 04:04

## 2021-04-25 RX ADMIN — INSULIN ASPART 8 UNITS: 100 INJECTION, SOLUTION INTRAVENOUS; SUBCUTANEOUS at 11:04

## 2021-04-26 VITALS
HEIGHT: 62 IN | HEART RATE: 110 BPM | OXYGEN SATURATION: 100 % | DIASTOLIC BLOOD PRESSURE: 94 MMHG | BODY MASS INDEX: 25.76 KG/M2 | RESPIRATION RATE: 25 BRPM | TEMPERATURE: 98 F | SYSTOLIC BLOOD PRESSURE: 139 MMHG | WEIGHT: 140 LBS

## 2021-04-26 PROBLEM — R33.9 URINARY RETENTION: Status: ACTIVE | Noted: 2021-04-26

## 2021-04-26 LAB
ANION GAP SERPL CALC-SCNC: 6 MMOL/L (ref 8–16)
ANION GAP SERPL CALC-SCNC: 6 MMOL/L (ref 8–16)
BUN SERPL-MCNC: 22 MG/DL (ref 6–30)
BUN SERPL-MCNC: 8 MG/DL (ref 6–20)
BUN SERPL-MCNC: 8 MG/DL (ref 6–20)
CALCIUM SERPL-MCNC: 7.3 MG/DL (ref 8.7–10.5)
CALCIUM SERPL-MCNC: 7.7 MG/DL (ref 8.7–10.5)
CHLORIDE SERPL-SCNC: 110 MMOL/L (ref 95–110)
CHLORIDE SERPL-SCNC: 112 MMOL/L (ref 95–110)
CHLORIDE SERPL-SCNC: 98 MMOL/L (ref 95–110)
CO2 SERPL-SCNC: 22 MMOL/L (ref 23–29)
CO2 SERPL-SCNC: 22 MMOL/L (ref 23–29)
CREAT SERPL-MCNC: 0.8 MG/DL (ref 0.5–1.4)
CREAT SERPL-MCNC: 0.9 MG/DL (ref 0.5–1.4)
CREAT SERPL-MCNC: 1.2 MG/DL (ref 0.5–1.4)
EST. GFR  (AFRICAN AMERICAN): >60 ML/MIN/1.73 M^2
EST. GFR  (AFRICAN AMERICAN): >60 ML/MIN/1.73 M^2
EST. GFR  (NON AFRICAN AMERICAN): >60 ML/MIN/1.73 M^2
EST. GFR  (NON AFRICAN AMERICAN): >60 ML/MIN/1.73 M^2
GLUCOSE SERPL-MCNC: 267 MG/DL (ref 70–110)
GLUCOSE SERPL-MCNC: 467 MG/DL (ref 70–110)
GLUCOSE SERPL-MCNC: 91 MG/DL (ref 70–110)
HCT VFR BLD CALC: 28 %PCV (ref 36–54)
HCV AB SERPL QL IA: NEGATIVE
HIV 1+2 AB+HIV1 P24 AG SERPL QL IA: NEGATIVE
POC IONIZED CALCIUM: 1.17 MMOL/L (ref 1.06–1.42)
POC TCO2 (MEASURED): 24 MMOL/L (ref 23–29)
POCT GLUCOSE: 104 MG/DL (ref 70–110)
POCT GLUCOSE: 218 MG/DL (ref 70–110)
POCT GLUCOSE: 222 MG/DL (ref 70–110)
POCT GLUCOSE: 269 MG/DL (ref 70–110)
POCT GLUCOSE: 32 MG/DL (ref 70–110)
POCT GLUCOSE: 68 MG/DL (ref 70–110)
POTASSIUM BLD-SCNC: 4.1 MMOL/L (ref 3.5–5.1)
POTASSIUM SERPL-SCNC: 3.9 MMOL/L (ref 3.5–5.1)
POTASSIUM SERPL-SCNC: 3.9 MMOL/L (ref 3.5–5.1)
SAMPLE: ABNORMAL
SODIUM BLD-SCNC: 134 MMOL/L (ref 136–145)
SODIUM SERPL-SCNC: 138 MMOL/L (ref 136–145)
SODIUM SERPL-SCNC: 140 MMOL/L (ref 136–145)

## 2021-04-26 PROCEDURE — 99239 HOSP IP/OBS DSCHRG MGMT >30: CPT | Mod: ,,, | Performed by: HOSPITALIST

## 2021-04-26 PROCEDURE — 25000003 PHARM REV CODE 250: Performed by: PHYSICIAN ASSISTANT

## 2021-04-26 PROCEDURE — 63600175 PHARM REV CODE 636 W HCPCS: Performed by: HOSPITALIST

## 2021-04-26 PROCEDURE — 25000003 PHARM REV CODE 250: Performed by: HOSPITALIST

## 2021-04-26 PROCEDURE — 36415 COLL VENOUS BLD VENIPUNCTURE: CPT | Performed by: PHYSICIAN ASSISTANT

## 2021-04-26 PROCEDURE — 80048 BASIC METABOLIC PNL TOTAL CA: CPT | Performed by: PHYSICIAN ASSISTANT

## 2021-04-26 PROCEDURE — 99239 PR HOSPITAL DISCHARGE DAY,>30 MIN: ICD-10-PCS | Mod: ,,, | Performed by: HOSPITALIST

## 2021-04-26 RX ORDER — POLYETHYLENE GLYCOL 3350 17 G/17G
17 POWDER, FOR SOLUTION ORAL DAILY
Qty: 30 EACH | Refills: 1 | Status: SHIPPED | OUTPATIENT
Start: 2021-04-26 | End: 2021-04-26 | Stop reason: SDUPTHER

## 2021-04-26 RX ORDER — PANTOPRAZOLE SODIUM 40 MG/1
40 TABLET, DELAYED RELEASE ORAL DAILY
Qty: 30 TABLET | Refills: 3 | Status: SHIPPED | OUTPATIENT
Start: 2021-04-26 | End: 2021-04-26

## 2021-04-26 RX ORDER — ONDANSETRON 4 MG/1
4 TABLET, FILM COATED ORAL EVERY 6 HOURS PRN
Qty: 30 TABLET | Refills: 0 | Status: SHIPPED | OUTPATIENT
Start: 2021-04-26 | End: 2021-04-26 | Stop reason: SDUPTHER

## 2021-04-26 RX ORDER — INSULIN GLULISINE 100 [IU]/ML
8 INJECTION, SOLUTION SUBCUTANEOUS
Qty: 21.6 ML | Refills: 3 | Status: SHIPPED | OUTPATIENT
Start: 2021-04-26 | End: 2021-04-26 | Stop reason: SDUPTHER

## 2021-04-26 RX ORDER — INSULIN GLARGINE 100 [IU]/ML
28 INJECTION, SOLUTION SUBCUTANEOUS NIGHTLY
Qty: 25.2 ML | Refills: 3 | Status: SHIPPED | OUTPATIENT
Start: 2021-04-26 | End: 2021-04-26 | Stop reason: SDUPTHER

## 2021-04-26 RX ORDER — ONDANSETRON 4 MG/1
4 TABLET, FILM COATED ORAL EVERY 6 HOURS PRN
Qty: 30 TABLET | Refills: 0 | Status: ON HOLD | OUTPATIENT
Start: 2021-04-26 | End: 2022-06-07 | Stop reason: HOSPADM

## 2021-04-26 RX ORDER — INSULIN GLULISINE 100 [IU]/ML
8 INJECTION, SOLUTION SUBCUTANEOUS
Qty: 21.6 ML | Refills: 3 | Status: SHIPPED | OUTPATIENT
Start: 2021-04-26 | End: 2022-07-07

## 2021-04-26 RX ORDER — OXYCODONE HYDROCHLORIDE 5 MG/1
5 TABLET ORAL EVERY 6 HOURS PRN
Qty: 20 TABLET | Refills: 0 | Status: ON HOLD | OUTPATIENT
Start: 2021-04-26 | End: 2022-06-07 | Stop reason: HOSPADM

## 2021-04-26 RX ORDER — AMOXICILLIN 250 MG
1 CAPSULE ORAL DAILY PRN
Qty: 30 TABLET | Refills: 1 | Status: ON HOLD | OUTPATIENT
Start: 2021-04-26 | End: 2022-06-07 | Stop reason: HOSPADM

## 2021-04-26 RX ORDER — PANTOPRAZOLE SODIUM 40 MG/1
40 TABLET, DELAYED RELEASE ORAL DAILY
Qty: 30 TABLET | Refills: 3 | Status: SHIPPED | OUTPATIENT
Start: 2021-04-26 | End: 2022-06-07

## 2021-04-26 RX ORDER — POLYETHYLENE GLYCOL 3350 17 G/17G
17 POWDER, FOR SOLUTION ORAL DAILY
Qty: 30 EACH | Refills: 1 | Status: ON HOLD | OUTPATIENT
Start: 2021-04-26 | End: 2022-06-07 | Stop reason: HOSPADM

## 2021-04-26 RX ORDER — INSULIN GLARGINE 100 [IU]/ML
28 INJECTION, SOLUTION SUBCUTANEOUS NIGHTLY
Qty: 25.2 ML | Refills: 3 | Status: ON HOLD | OUTPATIENT
Start: 2021-04-26 | End: 2022-06-07

## 2021-04-26 RX ORDER — AMOXICILLIN 250 MG
1 CAPSULE ORAL DAILY PRN
Qty: 30 TABLET | Refills: 1 | Status: SHIPPED | OUTPATIENT
Start: 2021-04-26 | End: 2021-04-26 | Stop reason: SDUPTHER

## 2021-04-26 RX ORDER — OXYCODONE HYDROCHLORIDE 5 MG/1
5 TABLET ORAL EVERY 6 HOURS PRN
Qty: 20 TABLET | Refills: 0 | Status: SHIPPED | OUTPATIENT
Start: 2021-04-26 | End: 2021-04-26

## 2021-04-26 RX ADMIN — INSULIN ASPART 6 UNITS: 100 INJECTION, SOLUTION INTRAVENOUS; SUBCUTANEOUS at 07:04

## 2021-04-26 RX ADMIN — SODIUM CHLORIDE: 0.9 INJECTION, SOLUTION INTRAVENOUS at 05:04

## 2021-04-26 RX ADMIN — Medication 16 G: at 11:04

## 2021-04-26 RX ADMIN — INSULIN ASPART 1 UNITS: 100 INJECTION, SOLUTION INTRAVENOUS; SUBCUTANEOUS at 12:04

## 2021-04-26 RX ADMIN — INSULIN DETEMIR 14 UNITS: 100 INJECTION, SOLUTION SUBCUTANEOUS at 08:04

## 2021-04-26 RX ADMIN — PANTOPRAZOLE SODIUM 40 MG: 40 TABLET, DELAYED RELEASE ORAL at 08:04

## 2021-04-26 RX ADMIN — OXYCODONE HYDROCHLORIDE 5 MG: 5 TABLET ORAL at 12:04

## 2021-04-26 RX ADMIN — INSULIN ASPART 8 UNITS: 100 INJECTION, SOLUTION INTRAVENOUS; SUBCUTANEOUS at 07:04

## 2021-04-27 LAB
FINAL PATHOLOGIC DIAGNOSIS: NORMAL
Lab: NORMAL

## 2021-04-29 LAB
BACTERIA BLD CULT: NORMAL
BACTERIA BLD CULT: NORMAL

## 2021-05-04 ENCOUNTER — OFFICE VISIT (OUTPATIENT)
Dept: UROLOGY | Facility: CLINIC | Age: 31
End: 2021-05-04
Payer: MEDICAID

## 2021-05-04 VITALS
HEIGHT: 62 IN | DIASTOLIC BLOOD PRESSURE: 98 MMHG | HEART RATE: 118 BPM | WEIGHT: 144.75 LBS | BODY MASS INDEX: 26.64 KG/M2 | SYSTOLIC BLOOD PRESSURE: 143 MMHG

## 2021-05-04 DIAGNOSIS — N13.30 HYDRONEPHROSIS, UNSPECIFIED HYDRONEPHROSIS TYPE: Primary | ICD-10-CM

## 2021-05-04 DIAGNOSIS — R33.9 URINARY RETENTION: ICD-10-CM

## 2021-05-04 PROCEDURE — 99999 PR PBB SHADOW E&M-EST. PATIENT-LVL IV: ICD-10-PCS | Mod: PBBFAC,,, | Performed by: NURSE PRACTITIONER

## 2021-05-04 PROCEDURE — 99214 PR OFFICE/OUTPT VISIT, EST, LEVL IV, 30-39 MIN: ICD-10-PCS | Mod: S$PBB,,, | Performed by: NURSE PRACTITIONER

## 2021-05-04 PROCEDURE — 99214 OFFICE O/P EST MOD 30 MIN: CPT | Mod: S$PBB,,, | Performed by: NURSE PRACTITIONER

## 2021-05-04 PROCEDURE — 99214 OFFICE O/P EST MOD 30 MIN: CPT | Mod: PBBFAC,PN | Performed by: NURSE PRACTITIONER

## 2021-05-04 PROCEDURE — 99999 PR PBB SHADOW E&M-EST. PATIENT-LVL IV: CPT | Mod: PBBFAC,,, | Performed by: NURSE PRACTITIONER

## 2021-05-04 RX ORDER — ONDANSETRON 4 MG/1
4 TABLET, ORALLY DISINTEGRATING ORAL EVERY 6 HOURS PRN
COMMUNITY
Start: 2021-04-22 | End: 2022-04-12

## 2021-05-04 RX ORDER — CEPHALEXIN 500 MG/1
500 CAPSULE ORAL 4 TIMES DAILY
Status: ON HOLD | COMMUNITY
Start: 2021-03-28 | End: 2022-06-07 | Stop reason: HOSPADM

## 2021-05-04 RX ORDER — DOXYCYCLINE 100 MG/1
100 CAPSULE ORAL 2 TIMES DAILY
Status: ON HOLD | COMMUNITY
Start: 2021-03-22 | End: 2022-06-07 | Stop reason: HOSPADM

## 2021-05-04 RX ORDER — PROMETHAZINE HYDROCHLORIDE 25 MG/1
25 SUPPOSITORY RECTAL EVERY 6 HOURS PRN
COMMUNITY
Start: 2021-04-22 | End: 2022-04-12

## 2021-05-14 ENCOUNTER — HOSPITAL ENCOUNTER (OUTPATIENT)
Dept: RADIOLOGY | Facility: OTHER | Age: 31
Discharge: HOME | End: 2021-05-14
Attending: STUDENT IN AN ORGANIZED HEALTH CARE EDUCATION/TRAINING PROGRAM
Payer: MEDICAID

## 2021-05-14 ENCOUNTER — TELEPHONE (OUTPATIENT)
Dept: UROLOGY | Facility: CLINIC | Age: 31
End: 2021-05-14

## 2021-05-14 ENCOUNTER — HOSPITAL ENCOUNTER (OUTPATIENT)
Dept: RADIOLOGY | Facility: OTHER | Age: 31
Discharge: HOME | End: 2021-05-14
Attending: NURSE PRACTITIONER
Payer: MEDICAID

## 2021-05-14 DIAGNOSIS — N13.30 HYDRONEPHROSIS OF LEFT KIDNEY: ICD-10-CM

## 2021-05-14 DIAGNOSIS — N13.30 HYDRONEPHROSIS, UNSPECIFIED HYDRONEPHROSIS TYPE: ICD-10-CM

## 2021-05-14 PROCEDURE — 76770 US EXAM ABDO BACK WALL COMP: CPT | Mod: TC

## 2021-05-14 PROCEDURE — 76770 US RETROPERITONEAL COMPLETE: ICD-10-PCS | Mod: 26,,, | Performed by: RADIOLOGY

## 2021-05-14 PROCEDURE — 78708 K FLOW/FUNCT IMAGE W/DRUG: CPT | Mod: 26,,, | Performed by: RADIOLOGY

## 2021-05-14 PROCEDURE — 63600175 PHARM REV CODE 636 W HCPCS: Performed by: STUDENT IN AN ORGANIZED HEALTH CARE EDUCATION/TRAINING PROGRAM

## 2021-05-14 PROCEDURE — A9562 TC99M MERTIATIDE: HCPCS

## 2021-05-14 PROCEDURE — 76770 US EXAM ABDO BACK WALL COMP: CPT | Mod: 26,,, | Performed by: RADIOLOGY

## 2021-05-14 PROCEDURE — 78708 NM KIDNEY W FLOW AND FUNCTION W PHARMACOLOGICAL: ICD-10-PCS | Mod: 26,,, | Performed by: RADIOLOGY

## 2021-05-14 RX ORDER — FUROSEMIDE 10 MG/ML
40 INJECTION INTRAMUSCULAR; INTRAVENOUS ONCE
Status: COMPLETED | OUTPATIENT
Start: 2021-05-14 | End: 2021-05-14

## 2021-05-14 RX ADMIN — FUROSEMIDE 40 MG: 10 INJECTION, SOLUTION INTRAMUSCULAR; INTRAVENOUS at 12:05

## 2021-12-20 ENCOUNTER — HOSPITAL ENCOUNTER (EMERGENCY)
Facility: OTHER | Age: 31
Discharge: LEFT BEFORE DISCHARGE | End: 2021-12-21
Attending: EMERGENCY MEDICINE
Payer: MEDICAID

## 2021-12-20 VITALS
DIASTOLIC BLOOD PRESSURE: 93 MMHG | BODY MASS INDEX: 24.29 KG/M2 | WEIGHT: 132 LBS | OXYGEN SATURATION: 100 % | TEMPERATURE: 98 F | HEIGHT: 62 IN | RESPIRATION RATE: 16 BRPM | SYSTOLIC BLOOD PRESSURE: 157 MMHG | HEART RATE: 98 BPM

## 2021-12-20 DIAGNOSIS — Z20.822 CONTACT WITH AND (SUSPECTED) EXPOSURE TO COVID-19: Primary | ICD-10-CM

## 2021-12-20 DIAGNOSIS — Z23 ENCOUNTER FOR ADMINISTRATION OF COVID-19 VACCINE: ICD-10-CM

## 2021-12-20 LAB
B-HCG UR QL: NEGATIVE
CTP QC/QA: YES
CTP QC/QA: YES
SARS-COV-2 RDRP RESP QL NAA+PROBE: NEGATIVE

## 2021-12-20 PROCEDURE — U0002 COVID-19 LAB TEST NON-CDC: HCPCS | Performed by: EMERGENCY MEDICINE

## 2021-12-20 PROCEDURE — 99284 EMERGENCY DEPT VISIT MOD MDM: CPT | Mod: 25

## 2021-12-20 PROCEDURE — 81025 URINE PREGNANCY TEST: CPT | Performed by: EMERGENCY MEDICINE

## 2021-12-21 PROBLEM — Z59.00 HOMELESSNESS: Status: ACTIVE | Noted: 2021-12-21

## 2021-12-21 RX ORDER — ACETAMINOPHEN 325 MG/1
650 TABLET ORAL EVERY 6 HOURS PRN
Qty: 30 TABLET | Refills: 0 | Status: SHIPPED | OUTPATIENT
Start: 2021-12-21 | End: 2022-07-07

## 2021-12-21 RX ORDER — IBUPROFEN 600 MG/1
600 TABLET ORAL EVERY 6 HOURS PRN
Qty: 20 TABLET | Refills: 0 | Status: ON HOLD | OUTPATIENT
Start: 2021-12-21 | End: 2022-06-07 | Stop reason: HOSPADM

## 2022-04-11 ENCOUNTER — HOSPITAL ENCOUNTER (EMERGENCY)
Facility: OTHER | Age: 32
Discharge: HOME | End: 2022-04-12
Attending: EMERGENCY MEDICINE
Payer: MEDICAID

## 2022-04-11 DIAGNOSIS — N18.4 CKD (CHRONIC KIDNEY DISEASE), STAGE IV: ICD-10-CM

## 2022-04-11 DIAGNOSIS — E08.8 DIABETES MELLITUS DUE TO UNDERLYING CONDITION WITH UNSPECIFIED COMPLICATIONS: ICD-10-CM

## 2022-04-11 DIAGNOSIS — R10.13 EPIGASTRIC PAIN: ICD-10-CM

## 2022-04-11 DIAGNOSIS — K31.84 GASTROPARESIS: Primary | ICD-10-CM

## 2022-04-11 DIAGNOSIS — D57.3 SICKLE CELL TRAIT: ICD-10-CM

## 2022-04-11 DIAGNOSIS — I50.30 HEART FAILURE WITH PRESERVED EJECTION FRACTION, UNSPECIFIED HF CHRONICITY: ICD-10-CM

## 2022-04-11 LAB
ALBUMIN SERPL BCP-MCNC: 2.5 G/DL (ref 3.5–5.2)
ALLENS TEST: ABNORMAL
ALP SERPL-CCNC: 48 U/L (ref 55–135)
ALT SERPL W/O P-5'-P-CCNC: 11 U/L (ref 10–44)
ANION GAP SERPL CALC-SCNC: 9 MMOL/L (ref 8–16)
AST SERPL-CCNC: 20 U/L (ref 10–40)
BASOPHILS # BLD AUTO: 0.02 K/UL (ref 0–0.2)
BASOPHILS NFR BLD: 0.4 % (ref 0–1.9)
BILIRUB SERPL-MCNC: 2.5 MG/DL (ref 0.1–1)
BUN SERPL-MCNC: 38 MG/DL (ref 6–20)
CALCIUM SERPL-MCNC: 8.2 MG/DL (ref 8.7–10.5)
CHLORIDE SERPL-SCNC: 105 MMOL/L (ref 95–110)
CK SERPL-CCNC: 303 U/L (ref 20–180)
CO2 SERPL-SCNC: 21 MMOL/L (ref 23–29)
CREAT SERPL-MCNC: 3.4 MG/DL (ref 0.5–1.4)
DIFFERENTIAL METHOD: ABNORMAL
EOSINOPHIL # BLD AUTO: 0.1 K/UL (ref 0–0.5)
EOSINOPHIL NFR BLD: 1.9 % (ref 0–8)
ERYTHROCYTE [DISTWIDTH] IN BLOOD BY AUTOMATED COUNT: 13.9 % (ref 11.5–14.5)
EST. GFR  (AFRICAN AMERICAN): 19 ML/MIN/1.73 M^2
EST. GFR  (NON AFRICAN AMERICAN): 17 ML/MIN/1.73 M^2
GLUCOSE SERPL-MCNC: 176 MG/DL (ref 70–110)
HCG INTACT+B SERPL-ACNC: <1.2 MIU/ML
HCO3 UR-SCNC: 22.6 MMOL/L (ref 24–28)
HCT VFR BLD AUTO: 19 % (ref 37–48.5)
HCT VFR BLD CALC: 24 %PCV (ref 36–54)
HGB BLD-MCNC: 7 G/DL (ref 12–16)
HGB BLD-MCNC: 8 G/DL
IMM GRANULOCYTES # BLD AUTO: 0.01 K/UL (ref 0–0.04)
IMM GRANULOCYTES NFR BLD AUTO: 0.2 % (ref 0–0.5)
LIPASE SERPL-CCNC: 10 U/L (ref 4–60)
LYMPHOCYTES # BLD AUTO: 1.5 K/UL (ref 1–4.8)
LYMPHOCYTES NFR BLD: 26.3 % (ref 18–48)
MCH RBC QN AUTO: 29.9 PG (ref 27–31)
MCHC RBC AUTO-ENTMCNC: 36.8 G/DL (ref 32–36)
MCV RBC AUTO: 81 FL (ref 82–98)
MONOCYTES # BLD AUTO: 0.4 K/UL (ref 0.3–1)
MONOCYTES NFR BLD: 7.2 % (ref 4–15)
NEUTROPHILS # BLD AUTO: 3.7 K/UL (ref 1.8–7.7)
NEUTROPHILS NFR BLD: 64 % (ref 38–73)
NRBC BLD-RTO: 0 /100 WBC
PCO2 BLDA: 39.5 MMHG (ref 35–45)
PH SMN: 7.37 [PH] (ref 7.35–7.45)
PLATELET # BLD AUTO: 67 K/UL (ref 150–450)
PMV BLD AUTO: 8.9 FL (ref 9.2–12.9)
PO2 BLDA: 25 MMHG (ref 40–60)
POC BE: -3 MMOL/L
POC SATURATED O2: 42 % (ref 95–100)
POC TCO2: 24 MMOL/L (ref 24–29)
POTASSIUM BLD-SCNC: 3.9 MMOL/L (ref 3.5–5.1)
POTASSIUM SERPL-SCNC: 4 MMOL/L (ref 3.5–5.1)
PROT SERPL-MCNC: 5.1 G/DL (ref 6–8.4)
RBC # BLD AUTO: 2.34 M/UL (ref 4–5.4)
SAMPLE: ABNORMAL
SITE: ABNORMAL
SODIUM BLD-SCNC: 136 MMOL/L (ref 136–145)
SODIUM SERPL-SCNC: 135 MMOL/L (ref 136–145)
WBC # BLD AUTO: 5.71 K/UL (ref 3.9–12.7)

## 2022-04-11 PROCEDURE — 85014 HEMATOCRIT: CPT | Mod: 91

## 2022-04-11 PROCEDURE — 82550 ASSAY OF CK (CPK): CPT | Performed by: EMERGENCY MEDICINE

## 2022-04-11 PROCEDURE — 96375 TX/PRO/DX INJ NEW DRUG ADDON: CPT

## 2022-04-11 PROCEDURE — 96361 HYDRATE IV INFUSION ADD-ON: CPT

## 2022-04-11 PROCEDURE — 80053 COMPREHEN METABOLIC PANEL: CPT | Performed by: EMERGENCY MEDICINE

## 2022-04-11 PROCEDURE — 83690 ASSAY OF LIPASE: CPT | Performed by: EMERGENCY MEDICINE

## 2022-04-11 PROCEDURE — 96374 THER/PROPH/DIAG INJ IV PUSH: CPT

## 2022-04-11 PROCEDURE — 80048 BASIC METABOLIC PNL TOTAL CA: CPT | Mod: XB | Performed by: EMERGENCY MEDICINE

## 2022-04-11 PROCEDURE — 99900035 HC TECH TIME PER 15 MIN (STAT)

## 2022-04-11 PROCEDURE — 84132 ASSAY OF SERUM POTASSIUM: CPT

## 2022-04-11 PROCEDURE — 84295 ASSAY OF SERUM SODIUM: CPT | Mod: 91

## 2022-04-11 PROCEDURE — 99284 EMERGENCY DEPT VISIT MOD MDM: CPT | Mod: 25

## 2022-04-11 PROCEDURE — 84702 CHORIONIC GONADOTROPIN TEST: CPT | Performed by: EMERGENCY MEDICINE

## 2022-04-11 PROCEDURE — 63600175 PHARM REV CODE 636 W HCPCS: Performed by: EMERGENCY MEDICINE

## 2022-04-11 PROCEDURE — 82565 ASSAY OF CREATININE: CPT

## 2022-04-11 PROCEDURE — 85025 COMPLETE CBC W/AUTO DIFF WBC: CPT | Performed by: EMERGENCY MEDICINE

## 2022-04-11 RX ORDER — DROPERIDOL 2.5 MG/ML
2.5 INJECTION, SOLUTION INTRAMUSCULAR; INTRAVENOUS ONCE
Status: COMPLETED | OUTPATIENT
Start: 2022-04-11 | End: 2022-04-11

## 2022-04-11 RX ORDER — DIPHENHYDRAMINE HYDROCHLORIDE 50 MG/ML
12.5 INJECTION INTRAMUSCULAR; INTRAVENOUS
Status: COMPLETED | OUTPATIENT
Start: 2022-04-11 | End: 2022-04-11

## 2022-04-11 RX ORDER — METOCLOPRAMIDE HYDROCHLORIDE 5 MG/ML
10 INJECTION INTRAMUSCULAR; INTRAVENOUS ONCE AS NEEDED
Status: COMPLETED | OUTPATIENT
Start: 2022-04-11 | End: 2022-04-11

## 2022-04-11 RX ADMIN — SODIUM CHLORIDE, SODIUM LACTATE, POTASSIUM CHLORIDE, AND CALCIUM CHLORIDE 2000 ML: .6; .31; .03; .02 INJECTION, SOLUTION INTRAVENOUS at 09:04

## 2022-04-11 RX ADMIN — DIPHENHYDRAMINE HYDROCHLORIDE 12.5 MG: 50 INJECTION, SOLUTION INTRAMUSCULAR; INTRAVENOUS at 10:04

## 2022-04-11 RX ADMIN — DROPERIDOL 2.5 MG: 2.5 INJECTION, SOLUTION INTRAMUSCULAR; INTRAVENOUS at 10:04

## 2022-04-11 RX ADMIN — METOCLOPRAMIDE 10 MG: 5 INJECTION, SOLUTION INTRAMUSCULAR; INTRAVENOUS at 10:04

## 2022-04-11 NOTE — ED TRIAGE NOTES
"Pt presents to the ED with complaints of stomach and back pain. Pt states, "I have been having this pain since I left last Monday. It feels like somebody is stabbing me in my back." pt rates her pain 10/10 at this time. Pt states she took tylenol earlier today. Pt alert and oriented x3, no signs of acute distress.  "

## 2022-04-12 VITALS
OXYGEN SATURATION: 100 % | TEMPERATURE: 98 F | WEIGHT: 145 LBS | HEART RATE: 97 BPM | DIASTOLIC BLOOD PRESSURE: 90 MMHG | SYSTOLIC BLOOD PRESSURE: 152 MMHG | RESPIRATION RATE: 22 BRPM | BODY MASS INDEX: 26.68 KG/M2 | HEIGHT: 62 IN

## 2022-04-12 PROBLEM — E83.39 HYPOPHOSPHATEMIA: Status: RESOLVED | Noted: 2020-12-14 | Resolved: 2022-04-12

## 2022-04-12 PROBLEM — N12 PYELONEPHRITIS: Status: RESOLVED | Noted: 2018-09-16 | Resolved: 2022-04-12

## 2022-04-12 PROBLEM — E87.6 HYPOKALEMIA: Status: RESOLVED | Noted: 2020-09-10 | Resolved: 2022-04-12

## 2022-04-12 PROBLEM — N13.30 HYDRONEPHROSIS OF LEFT KIDNEY: Status: RESOLVED | Noted: 2018-09-15 | Resolved: 2022-04-12

## 2022-04-12 PROBLEM — B96.89 UTI DUE TO KLEBSIELLA SPECIES: Status: RESOLVED | Noted: 2018-09-12 | Resolved: 2022-04-12

## 2022-04-12 PROBLEM — N39.0 UTI (URINARY TRACT INFECTION): Status: RESOLVED | Noted: 2020-12-14 | Resolved: 2022-04-12

## 2022-04-12 PROBLEM — R10.84 GENERALIZED ABDOMINAL PAIN: Status: RESOLVED | Noted: 2018-09-15 | Resolved: 2022-04-12

## 2022-04-12 PROBLEM — N39.0 UTI DUE TO KLEBSIELLA SPECIES: Status: RESOLVED | Noted: 2018-09-12 | Resolved: 2022-04-12

## 2022-04-12 PROBLEM — K31.84 GASTROPARESIS: Status: ACTIVE | Noted: 2022-04-12

## 2022-04-12 PROBLEM — I50.30 HEART FAILURE WITH PRESERVED EJECTION FRACTION: Status: ACTIVE | Noted: 2022-04-12

## 2022-04-12 PROBLEM — R19.7 DIARRHEA: Status: RESOLVED | Noted: 2018-09-12 | Resolved: 2022-04-12

## 2022-04-12 PROBLEM — R33.9 URINARY RETENTION: Status: RESOLVED | Noted: 2021-04-26 | Resolved: 2022-04-12

## 2022-04-12 PROBLEM — E11.10 DIABETIC KETOACIDOSIS WITHOUT COMA ASSOCIATED WITH TYPE 2 DIABETES MELLITUS: Status: RESOLVED | Noted: 2021-04-23 | Resolved: 2022-04-12

## 2022-04-12 PROBLEM — N18.4 CKD (CHRONIC KIDNEY DISEASE), STAGE IV: Status: ACTIVE | Noted: 2022-04-12

## 2022-04-12 PROBLEM — E08.8 DIABETES MELLITUS DUE TO UNDERLYING CONDITION WITH UNSPECIFIED COMPLICATIONS: Status: ACTIVE | Noted: 2022-04-12

## 2022-04-12 PROBLEM — I95.9 HYPOTENSION: Status: RESOLVED | Noted: 2018-09-12 | Resolved: 2022-04-12

## 2022-04-12 PROBLEM — R11.10 INTRACTABLE VOMITING: Status: RESOLVED | Noted: 2020-02-27 | Resolved: 2022-04-12

## 2022-04-12 PROBLEM — E11.01 HYPEROSMOLAR HYPERGLYCEMIC COMA DUE TO DIABETES MELLITUS WITHOUT KETOACIDOSIS: Status: RESOLVED | Noted: 2020-09-10 | Resolved: 2022-04-12

## 2022-04-12 PROBLEM — R11.2 INTRACTABLE NAUSEA AND VOMITING: Status: RESOLVED | Noted: 2019-10-16 | Resolved: 2022-04-12

## 2022-04-12 PROBLEM — D57.3 SICKLE CELL TRAIT: Status: ACTIVE | Noted: 2022-04-12

## 2022-04-12 PROBLEM — N17.9 AKI (ACUTE KIDNEY INJURY): Status: RESOLVED | Noted: 2020-12-14 | Resolved: 2022-04-12

## 2022-04-12 PROBLEM — R31.9 HEMATURIA: Status: RESOLVED | Noted: 2021-04-23 | Resolved: 2022-04-12

## 2022-04-12 PROBLEM — I16.0 HYPERTENSIVE URGENCY: Status: RESOLVED | Noted: 2021-04-24 | Resolved: 2022-04-12

## 2022-04-12 LAB
ANION GAP SERPL CALC-SCNC: 10 MMOL/L (ref 8–16)
ANION GAP SERPL CALC-SCNC: 9 MMOL/L (ref 8–16)
BUN SERPL-MCNC: 36 MG/DL (ref 6–20)
BUN SERPL-MCNC: 37 MG/DL (ref 6–20)
CALCIUM SERPL-MCNC: 7.7 MG/DL (ref 8.7–10.5)
CALCIUM SERPL-MCNC: 8 MG/DL (ref 8.7–10.5)
CHLORIDE SERPL-SCNC: 108 MMOL/L (ref 95–110)
CHLORIDE SERPL-SCNC: 108 MMOL/L (ref 95–110)
CO2 SERPL-SCNC: 19 MMOL/L (ref 23–29)
CO2 SERPL-SCNC: 20 MMOL/L (ref 23–29)
CREAT SERPL-MCNC: 3 MG/DL (ref 0.5–1.4)
CREAT SERPL-MCNC: 3.2 MG/DL (ref 0.5–1.4)
CREAT SERPL-MCNC: 3.3 MG/DL (ref 0.5–1.4)
EST. GFR  (AFRICAN AMERICAN): 21 ML/MIN/1.73 M^2
EST. GFR  (AFRICAN AMERICAN): 23 ML/MIN/1.73 M^2
EST. GFR  (NON AFRICAN AMERICAN): 18 ML/MIN/1.73 M^2
EST. GFR  (NON AFRICAN AMERICAN): 20 ML/MIN/1.73 M^2
GLUCOSE SERPL-MCNC: 131 MG/DL (ref 70–110)
GLUCOSE SERPL-MCNC: 149 MG/DL (ref 70–110)
POTASSIUM SERPL-SCNC: 3.7 MMOL/L (ref 3.5–5.1)
POTASSIUM SERPL-SCNC: 4 MMOL/L (ref 3.5–5.1)
SAMPLE: ABNORMAL
SODIUM SERPL-SCNC: 137 MMOL/L (ref 136–145)
SODIUM SERPL-SCNC: 137 MMOL/L (ref 136–145)

## 2022-04-12 PROCEDURE — 99900035 HC TECH TIME PER 15 MIN (STAT)

## 2022-04-12 PROCEDURE — 63600175 PHARM REV CODE 636 W HCPCS: Performed by: EMERGENCY MEDICINE

## 2022-04-12 PROCEDURE — 80048 BASIC METABOLIC PNL TOTAL CA: CPT | Performed by: EMERGENCY MEDICINE

## 2022-04-12 PROCEDURE — 96361 HYDRATE IV INFUSION ADD-ON: CPT

## 2022-04-12 PROCEDURE — 82565 ASSAY OF CREATININE: CPT

## 2022-04-12 RX ORDER — PROMETHAZINE HYDROCHLORIDE 25 MG/1
25 SUPPOSITORY RECTAL EVERY 6 HOURS PRN
Qty: 10 SUPPOSITORY | Refills: 0 | Status: ON HOLD | OUTPATIENT
Start: 2022-04-12 | End: 2022-06-07 | Stop reason: HOSPADM

## 2022-04-12 RX ORDER — ONDANSETRON 4 MG/1
4 TABLET, ORALLY DISINTEGRATING ORAL EVERY 6 HOURS PRN
Qty: 12 TABLET | Refills: 0 | Status: ON HOLD | OUTPATIENT
Start: 2022-04-12 | End: 2022-06-07 | Stop reason: HOSPADM

## 2022-04-12 RX ORDER — METOCLOPRAMIDE 10 MG/1
10 TABLET ORAL EVERY 6 HOURS PRN
Qty: 10 TABLET | Refills: 0 | Status: SHIPPED | OUTPATIENT
Start: 2022-04-12 | End: 2022-05-06 | Stop reason: SDUPTHER

## 2022-04-12 RX ADMIN — SODIUM CHLORIDE, SODIUM LACTATE, POTASSIUM CHLORIDE, AND CALCIUM CHLORIDE 1000 ML: .6; .31; .03; .02 INJECTION, SOLUTION INTRAVENOUS at 02:04

## 2022-04-12 NOTE — DISCHARGE INSTRUCTIONS
Call your primary care doctor to make the first available appointment.     Keep all your medical appointments.     Take your regular medication as prescribed. Contact your primary care provider before running out of medication, or for any problems obtaining them.    Do not drive or operate heavy machinery while taking opioid or muscle relaxing medications. Examples include norco, percocet, xanax, valium, flexeril.     Overuse or long term use of pain and sedating medication may lead to addiction, dependence, organ failure, family and work problems, legal problems, accidental overdose and death.    If you do not have health insurance, you probably can afford it:  Call [phone] ([facility] hotline) or go to www.[place].[place].gov    Your evaluation in the ED does not suggest any emergent or life threatening medical condition requiring admission or immediate intervention beyond that provided in the ED.   However, the signs of a serious problem sometimes take more time to appear.     RETURN TO THE ER if any of the following occur:    Weakness, dizziness, fainting, or loss of consciousness   Fever of 100.4ºF (38ºC) or higher  Any worse symptoms  Any new or concerning symptoms    To protect yourself and others from COVID19:  Get vaccinated.   Anyone over 5 years old is eligible for vaccination.   Everyone 18 and older should get 3 total vaccine doses. Anyone over 50 years old or with certain chronic conditions should get a 4th dose.   Vaccination is shown to prevent getting sick, ending up in the hospital, or dying because of COVID19.   If you are vaccinated, help friends and family get the vaccine.    If not vaccinated:  Your shot is waiting for you. To get it:   Text your ZIP code to GETVAX ([id number]) or VACUNA ([id number]) in Ecu[place]  call 311, or [phone], or [phone], or [phone],   go to www.vaccines.gov, or  Call your health provider  If exposed to someone with cold, flu, or COVID19 symptoms, you must quarantine  for at least 5 days.   Even if you have no symptoms   Otherwise you could give the virus to someone who dies from it  Some symptoms of COVID19 include fever, cough, sore throat, breathing troubles, loss of taste/smell, headaches, stomach upset, diarrhea.

## 2022-04-12 NOTE — ED PROVIDER NOTES
"  Source of History:  Medical record, patient    Chief complaint:  Per triage note: "Body Pain (For 6 days, chest, back, flank pain . Seen at other facilities with similar issue. Hx of multiple transfusions, sickle cell trait )  "    HPI:  My history differs from that at triage.  Tabby Howard is a 32 y.o. female who presents with left sided abdominal pain and back pain for the past week. She also reports nausea, vomiting, constipation, and dyspnea secondary to pain. She denies any fever or cough. Patient was seen at [facility] ED on 4/5 for similar symptoms and notes that she received a blood transfusion at that time. She  Feels that [facility] did not treat her adequately and that her symptoms never quite resolved at or since discharge.  Review of the medical record shows that she left against medical advice when evaluated for similar symptoms on April 5 encounter. She reports taking ibuprofen earlier today, denies taking any other analgesics since symptom onset.  This is a recurrent problem.    This is the extent of the patient's complaints at this time.     ROS:   As per HPI and below:   General: No fever.   HENT: No facial pain.   Eyes: No eye pain.   Cardiovascular:   Denieschest pain.   Respiratory:   No dyspnea. No cough.  GI: Notes abdominal pain. Notes nausea.   Denies vomiting. No diarrhea. No constipation.   Skin: No rashes.   Neuro:  No syncope.  No focal deficits.   Musculoskeletal: Notes back pain.  All other systems reviewed and are negative.      Review of patient's allergies indicates:   Allergen Reactions    Penicillins Hives       PMH:  As per HPI and below:  Past Medical History:   Diagnosis Date    CKD (chronic kidney disease), stage IV 4/12/2022    Diabetes mellitus due to underlying condition with unspecified complications 4/12/2022    Gastroparesis 4/12/2022    Heart failure with preserved ejection fraction 4/12/2022    EF 55% on 3/22    History of gastroesophageal reflux (GERD)     History of " "supraventricular tachycardia     Sickle cell trait 2022    Type 2 diabetes mellitus        Past Surgical History:   Procedure Laterality Date     SECTION      x 3    ESOPHAGOGASTRODUODENOSCOPY N/A 10/18/2019    Procedure: ESOPHAGOGASTRODUODENOSCOPY (EGD);  Surgeon: Gianluca Mendez MD;  Location: [facility];  Service: Endoscopy;  Laterality: N/A;       Social History     Tobacco Use    Smoking status: Never Smoker    Smokeless tobacco: Never Used   Substance Use Topics    Alcohol use: No    Drug use: No       Physical Exam:      Nursing note and vitals reviewed.  BP (!) 152/90   Pulse 97   Temp 98.2 °F (36.8 °C) (Oral)   Resp (!) 22   Ht 5' 2" (1.575 m)   Wt 65.8 kg (145 lb)   SpO2 100%   BMI 26.52 kg/m²       Constitutional: AAOx3. Rocking in bed due to pain.  Eyes: EOMI. No discharge. Anicteric.  HENT:   Neck: Normal range of motion. Neck supple.  Cardiovascular: Normal rate. No murmur, no gallop and no friction rub heard.   Pulmonary/Chest: No respiratory distress. Effort normal. No wheezes, no rales, no rhonchi.   Abdominal: Bowel sounds normal. Soft. No distension and no mass. There is no tenderness. There is no rebound, no guarding, no tenderness at McBurney's point.  Musculoskeletal: Normal range of motion.   Neurological: GCS 15. Alert and oriented to person, place, and time. No gross cranial nerve, light touch or strength deficit. Coordination normal.   Skin: Skin is warm and dry.   EXT: 2+ radial pulses.   Psychiatric: Behavior is normal. Judgment normal.  Poor eye contact.      MDM:    I decided to obtain the patient's medical records.    ED Course as of 22 1446    Patient is a 32-year-old female with CKD4 (Creatinine 2.6, eGFR 27 on ), diabetes mellitus, HFpEF (EF 55% in March), pericardial effusion, gastroparesis, hypertension, and sickle cell trait  who presents with abdominal pain for last week associated with nausea, no vomiting.  Pain " radiates to her back.    Of note, patient  has been seen in the emergency department 4 times this year for similar complaint, and was admitted to [facility] on 03/05 for similar complaint, had unrevealing workup apart from anasarca on CT abdomen/ pelvis, abdominal ultrasound with Doppler with normal mesenteric vasculature.  Plan at that time was to have inpatient gastric emptying studies, however could not clear the contrast so outpatient gastric emptying study was plan.  She had EGD on 02/21 with chronic gastritis, no gastric outlet obstruction, no H pylori, and no concerning histology for malignancy.     She denies any vomiting, but describes abdominal pain radiating to back.  No associated fevers.    On exam, patient uncomfortable appearing with dry mucous membranes.     Initial differential included rhabdomyolysis, acute kidney injury, gross metabolic abnormality, gastroparesis flare, acute pancreatitis, vaso-occlusive crisis, intestinal angioedema. [RC]   2141 I independently reviewed and interpreted labs which are notable for   Acute kidney injury with creatinine 3.4, eGFR 19, hemoglobin appears to be baseline at 7 compared to 5.1 on 04/05, with hemoglobin otherwise mostly 7-8 since January. .  [RC]   Zaye Apr 12, 2022   0016 Patient has requested food insistently and repeatedly for the past several hours.   On my reassessment, patient appears comfortable,  resting in fetal position which unfortunately is occluding her IV. She has received 500 cc of IV fluids of 2L ordered and hanging.  IV positioning maximized, fluids placed on pump, and patient encouraged to keep her arm straight to maximize rate of fluid resuscitation.    Repeat BMP improved  after the first several 100 cc of IV fluids.  Will resend after she completes 2 L.  If her creatinine improves significantly, I anticipate discharge. [RC]   0213  Previous primary care creatinine and BMP were drawn after patient received approximately 1.5 L IV  fluids.  POC creatinine is 3.4. BMP pending, appears unlikely to be sufficiently improved for discharge.  [RC]   0250 eGFR 23 compared to her baseline eGFR 27 after about 1.75L IV fluids.  Patient reports symptomatic improvement, feels comfortable with discharge home.    --  I discussed with patient and/or guardian/caretaker that this evaluation in the ED does not suggest any emergent or life threatening medical condition requiring admission or further immediate intervention or diagnostics. Regardless, an unremarkable evaluation in the ED does not preclude the development or presence of a serious or life threatening condition. As such, patient was instructed to return for any worsening, new, changed, or concerning symptoms.     I had a detailed discussion with patient and/or guardian/caretaker regarding findings, plan, return precautions, importance of medication adherence, need to follow-up with a PCP and specialist (GI). All questions answered.     Management decisions for this encounter made during COVID-19 public health emergency. Available resources, standards for appropriate emergency department evaluation, and admission vs. discharge standards have necessarily shifted and remain dynamic.     Note was created using voice recognition software. It may have occasional typographical errors not identified and edited despite initial review prior to signing.   [RC]      ED Course User Index  [RC] Varun Cleveland MD       Medications   metoclopramide HCl injection 10 mg (10 mg Intravenous Given 4/11/22 2214)   droperidoL injection 2.5 mg (2.5 mg Intravenous Given 4/11/22 2245)   diphenhydrAMINE injection 12.5 mg (12.5 mg Intravenous Given 4/11/22 2212)   lactated ringers bolus 2,000 mL (0 mLs Intravenous Stopped 4/12/22 0209)   lactated ringers bolus 1,000 mL (1,000 mLs Intravenous New Bag 4/12/22 0224)              IAntonette, scribed for, and in the presence of, Dr. Cleveland. I performed the scribed service and  the documentation accurately describes the services I performed. I attest to the accuracy of the note.     Physician Attestation for Scribe:   I, Varun Cleveland MD, reviewed documentation as scribed in my presence, which is both accurate and complete.    Diagnostic Impression:    1. Gastroparesis    2. Sickle cell trait    3. Epigastric pain    4. CKD (chronic kidney disease), stage IV    5. Heart failure with preserved ejection fraction, unspecified HF chronicity    6. Diabetes mellitus due to underlying condition with unspecified complications         ED Disposition Condition    Discharge Good        ED Prescriptions     Medication Sig Dispense Start Date End Date Auth. Provider    metoclopramide HCl (REGLAN) 10 MG tablet Take 1 tablet (10 mg total) by mouth every 6 (six) hours as needed (headache, nausea or vomiting). 10 tablet 4/12/2022  Varun Cleveland MD    promethazine (PHENERGAN) 25 MG suppository Place 1 suppository (25 mg total) rectally every 6 (six) hours as needed for Nausea (For severe nausea and vomiting not improved with oral medications). 10 suppository 4/12/2022  Varun Cleveland MD    ondansetron (ZOFRAN-ODT) 4 MG TbDL Take 1 tablet (4 mg total) by mouth every 6 (six) hours as needed (nausea or vomiting). 12 tablet 4/12/2022  Varun Cleveland MD        Follow-up Information     Follow up With Specialties Details Why Contact [facility] Gastroenterology Schedule an appointment as soon as possible for a visit  For recheck with specialist [address] [place] [id number]  [phone]             Varun Cleveland MD  04/12/22 9631

## 2022-05-06 ENCOUNTER — HOSPITAL ENCOUNTER (EMERGENCY)
Facility: HOSPITAL | Age: 32
Discharge: HOME | End: 2022-05-06
Attending: EMERGENCY MEDICINE
Payer: MEDICAID

## 2022-05-06 VITALS
RESPIRATION RATE: 16 BRPM | WEIGHT: 155 LBS | SYSTOLIC BLOOD PRESSURE: 169 MMHG | HEART RATE: 93 BPM | HEIGHT: 62 IN | BODY MASS INDEX: 28.52 KG/M2 | DIASTOLIC BLOOD PRESSURE: 119 MMHG | OXYGEN SATURATION: 96 % | TEMPERATURE: 99 F

## 2022-05-06 DIAGNOSIS — K31.84 GASTROPARESIS: Primary | ICD-10-CM

## 2022-05-06 DIAGNOSIS — D64.9 ANEMIA, UNSPECIFIED TYPE: ICD-10-CM

## 2022-05-06 LAB
ABO + RH BLD: NORMAL
ALBUMIN SERPL BCP-MCNC: 2.6 G/DL (ref 3.5–5.2)
ALP SERPL-CCNC: 73 U/L (ref 55–135)
ALT SERPL W/O P-5'-P-CCNC: 23 U/L (ref 10–44)
ANION GAP SERPL CALC-SCNC: 9 MMOL/L (ref 8–16)
AST SERPL-CCNC: 32 U/L (ref 10–40)
BASOPHILS # BLD AUTO: 0.04 K/UL (ref 0–0.2)
BASOPHILS NFR BLD: 0.7 % (ref 0–1.9)
BILIRUB SERPL-MCNC: 1.9 MG/DL (ref 0.1–1)
BLD GP AB SCN CELLS X3 SERPL QL: NORMAL
BLD PROD TYP BPU: NORMAL
BLOOD UNIT EXPIRATION DATE: NORMAL
BLOOD UNIT TYPE CODE: 6200
BLOOD UNIT TYPE: NORMAL
BUN SERPL-MCNC: 36 MG/DL (ref 6–20)
CALCIUM SERPL-MCNC: 8.5 MG/DL (ref 8.7–10.5)
CHLORIDE SERPL-SCNC: 110 MMOL/L (ref 95–110)
CO2 SERPL-SCNC: 19 MMOL/L (ref 23–29)
CODING SYSTEM: NORMAL
CREAT SERPL-MCNC: 3.5 MG/DL (ref 0.5–1.4)
DIFFERENTIAL METHOD: ABNORMAL
DISPENSE STATUS: NORMAL
EOSINOPHIL # BLD AUTO: 0.1 K/UL (ref 0–0.5)
EOSINOPHIL NFR BLD: 1.1 % (ref 0–8)
ERYTHROCYTE [DISTWIDTH] IN BLOOD BY AUTOMATED COUNT: 13.8 % (ref 11.5–14.5)
EST. GFR  (AFRICAN AMERICAN): 18.8 ML/MIN/1.73 M^2
EST. GFR  (NON AFRICAN AMERICAN): 16.3 ML/MIN/1.73 M^2
GLUCOSE SERPL-MCNC: 177 MG/DL (ref 70–110)
HCT VFR BLD AUTO: 17.2 % (ref 37–48.5)
HGB BLD-MCNC: 6 G/DL (ref 12–16)
IMM GRANULOCYTES # BLD AUTO: 0.02 K/UL (ref 0–0.04)
IMM GRANULOCYTES NFR BLD AUTO: 0.3 % (ref 0–0.5)
LYMPHOCYTES # BLD AUTO: 1.1 K/UL (ref 1–4.8)
LYMPHOCYTES NFR BLD: 18.2 % (ref 18–48)
MCH RBC QN AUTO: 30 PG (ref 27–31)
MCHC RBC AUTO-ENTMCNC: 34.9 G/DL (ref 32–36)
MCV RBC AUTO: 86 FL (ref 82–98)
MONOCYTES # BLD AUTO: 0.3 K/UL (ref 0.3–1)
MONOCYTES NFR BLD: 5.4 % (ref 4–15)
NEUTROPHILS # BLD AUTO: 4.6 K/UL (ref 1.8–7.7)
NEUTROPHILS NFR BLD: 74.3 % (ref 38–73)
NRBC BLD-RTO: 0 /100 WBC
NUM UNITS TRANS PACKED RBC: NORMAL
PLATELET # BLD AUTO: 172 K/UL (ref 150–450)
PMV BLD AUTO: 9.8 FL (ref 9.2–12.9)
POTASSIUM SERPL-SCNC: 5.2 MMOL/L (ref 3.5–5.1)
PROT SERPL-MCNC: 5.7 G/DL (ref 6–8.4)
RBC # BLD AUTO: 2 M/UL (ref 4–5.4)
SODIUM SERPL-SCNC: 138 MMOL/L (ref 136–145)
WBC # BLD AUTO: 6.14 K/UL (ref 3.9–12.7)

## 2022-05-06 PROCEDURE — 80053 COMPREHEN METABOLIC PANEL: CPT | Performed by: EMERGENCY MEDICINE

## 2022-05-06 PROCEDURE — 96374 THER/PROPH/DIAG INJ IV PUSH: CPT

## 2022-05-06 PROCEDURE — 96361 HYDRATE IV INFUSION ADD-ON: CPT

## 2022-05-06 PROCEDURE — 85025 COMPLETE CBC W/AUTO DIFF WBC: CPT | Performed by: EMERGENCY MEDICINE

## 2022-05-06 PROCEDURE — 96375 TX/PRO/DX INJ NEW DRUG ADDON: CPT

## 2022-05-06 PROCEDURE — 63600175 PHARM REV CODE 636 W HCPCS: Performed by: EMERGENCY MEDICINE

## 2022-05-06 PROCEDURE — 25000003 PHARM REV CODE 250: Performed by: EMERGENCY MEDICINE

## 2022-05-06 PROCEDURE — 36415 COLL VENOUS BLD VENIPUNCTURE: CPT | Performed by: EMERGENCY MEDICINE

## 2022-05-06 PROCEDURE — P9016 RBC LEUKOCYTES REDUCED: HCPCS | Performed by: EMERGENCY MEDICINE

## 2022-05-06 PROCEDURE — 36430 TRANSFUSION BLD/BLD COMPNT: CPT

## 2022-05-06 PROCEDURE — 86920 COMPATIBILITY TEST SPIN: CPT | Performed by: EMERGENCY MEDICINE

## 2022-05-06 PROCEDURE — 86850 RBC ANTIBODY SCREEN: CPT | Performed by: EMERGENCY MEDICINE

## 2022-05-06 PROCEDURE — 99285 EMERGENCY DEPT VISIT HI MDM: CPT | Mod: 25

## 2022-05-06 PROCEDURE — C9113 INJ PANTOPRAZOLE SODIUM, VIA: HCPCS | Performed by: EMERGENCY MEDICINE

## 2022-05-06 RX ORDER — SODIUM CHLORIDE 9 MG/ML
1000 INJECTION, SOLUTION INTRAVENOUS
Status: COMPLETED | OUTPATIENT
Start: 2022-05-06 | End: 2022-05-06

## 2022-05-06 RX ORDER — HALOPERIDOL 5 MG/ML
2.5 INJECTION INTRAMUSCULAR
Status: COMPLETED | OUTPATIENT
Start: 2022-05-06 | End: 2022-05-06

## 2022-05-06 RX ORDER — METOCLOPRAMIDE 10 MG/1
10 TABLET ORAL EVERY 6 HOURS PRN
Qty: 30 TABLET | Refills: 0 | Status: ON HOLD | OUTPATIENT
Start: 2022-05-06 | End: 2022-06-07 | Stop reason: HOSPADM

## 2022-05-06 RX ORDER — HYDROCODONE BITARTRATE AND ACETAMINOPHEN 500; 5 MG/1; MG/1
TABLET ORAL
Status: DISCONTINUED | OUTPATIENT
Start: 2022-05-06 | End: 2022-05-06 | Stop reason: HOSPADM

## 2022-05-06 RX ORDER — DIPHENHYDRAMINE HYDROCHLORIDE 50 MG/ML
25 INJECTION INTRAMUSCULAR; INTRAVENOUS
Status: COMPLETED | OUTPATIENT
Start: 2022-05-06 | End: 2022-05-06

## 2022-05-06 RX ORDER — PANTOPRAZOLE SODIUM 40 MG/10ML
40 INJECTION, POWDER, LYOPHILIZED, FOR SOLUTION INTRAVENOUS
Status: COMPLETED | OUTPATIENT
Start: 2022-05-06 | End: 2022-05-06

## 2022-05-06 RX ADMIN — HALOPERIDOL LACTATE 2.5 MG: 5 INJECTION, SOLUTION INTRAMUSCULAR at 07:05

## 2022-05-06 RX ADMIN — DIPHENHYDRAMINE HYDROCHLORIDE 25 MG: 50 INJECTION, SOLUTION INTRAMUSCULAR; INTRAVENOUS at 07:05

## 2022-05-06 RX ADMIN — SODIUM CHLORIDE 1000 ML: 9 INJECTION, SOLUTION INTRAVENOUS at 07:05

## 2022-05-06 RX ADMIN — PANTOPRAZOLE SODIUM 40 MG: 40 INJECTION, POWDER, FOR SOLUTION INTRAVENOUS at 07:05

## 2022-05-06 NOTE — ED PROVIDER NOTES
"Encounter Date: 2022       History     Chief Complaint   Patient presents with    Abdominal Pain     Pt states "I have been out of my medicine for a couple of weeks. I have an appointment with my dr next week."      Patient presents to the ER complaining of abdominal pain.  She has a longstanding history of stomach problems and is frequently seen in various emergency rooms in [place].  She lives in [place] but she is here locally because her father picked her up yesterday.  She complains of epigastric pain that is radiating diffusely.  Patient complains of nausea.  Patient says she has not had a bowel movement in a couple weeks but she has not been eating very much.  No history of hematemesis.  Denies difficulty with urination.  Patient says her sugars have been okay.  Review of the medical record reveals admissions for gastroparesis and DKA.  She also has a history of chronic kidney disease.        Review of patient's allergies indicates:   Allergen Reactions    Penicillins Hives     Past Medical History:   Diagnosis Date    CKD (chronic kidney disease), stage IV 2022    Diabetes mellitus due to underlying condition with unspecified complications 2022    Gastroparesis 2022    Heart failure with preserved ejection fraction 2022    EF 55% on 3/22    History of gastroesophageal reflux (GERD)     History of supraventricular tachycardia     Sickle cell trait 2022    Type 2 diabetes mellitus      Past Surgical History:   Procedure Laterality Date     SECTION      x 3    ESOPHAGOGASTRODUODENOSCOPY N/A 10/18/2019    Procedure: ESOPHAGOGASTRODUODENOSCOPY (EGD);  Surgeon: Gianluca Mendez MD;  Location: [facility];  Service: Endoscopy;  Laterality: N/A;     Family History   Problem Relation Age of Onset    Diabetes Mother     Diabetes Father      Social History     Tobacco Use    Smoking status: Never Smoker    Smokeless tobacco: Never Used   Substance Use Topics    " Alcohol use: No    Drug use: No     Review of Systems   Constitutional: Negative for fever.   HENT: Negative for sore throat.    Respiratory: Negative for shortness of breath.    Cardiovascular: Negative for chest pain.   Gastrointestinal: Positive for abdominal pain and nausea.   Genitourinary: Negative for dysuria.   All other systems reviewed and are negative.      Physical Exam     Initial Vitals [05/06/22 0642]   BP Pulse Resp Temp SpO2   (!) 176/114 104 17 98.1 °F (36.7 °C) 100 %      MAP       --         Physical Exam    Nursing note and vitals reviewed.  Constitutional: She appears well-developed and well-nourished. She appears distressed.   HENT:   Head: Normocephalic and atraumatic.   Right Ear: External ear normal.   Left Ear: External ear normal.   Nose: Nose normal.   Mucous membranes are dry.   Eyes: Conjunctivae and EOM are normal. Pupils are equal, round, and reactive to light.   Neck: Neck supple.   Normal range of motion.  Cardiovascular: Normal rate, regular rhythm and normal heart sounds.   Pulmonary/Chest: Breath sounds normal.   Abdominal: Abdomen is soft. She exhibits no distension. There is abdominal tenderness.   Bowel sounds mildly diminished. There is no rebound and no guarding.   Musculoskeletal:         General: Normal range of motion.      Cervical back: Normal range of motion and neck supple.     Neurological: She is alert and oriented to person, place, and time. No cranial nerve deficit.   Skin: Skin is warm and dry.   Psychiatric: She has a normal mood and affect. Thought content normal.         ED Course   Procedures  Labs Reviewed   CBC W/ AUTO DIFFERENTIAL - Abnormal; Notable for the following components:       Result Value    RBC 2.00 (*)     Hemoglobin 6.0 (*)     Hematocrit 17.2 (*)     Gran % 74.3 (*)     All other components within normal limits    Narrative:     Hemocrit critical result(s) called and verbal readback obtained from   Florentino Heard RN.   by TH3 05/06/2022  07:15   COMPREHENSIVE METABOLIC PANEL - Abnormal; Notable for the following components:    Potassium 5.2 (*)     CO2 19 (*)     Glucose 177 (*)     BUN 36 (*)     Creatinine 3.5 (*)     Calcium 8.5 (*)     Total Protein 5.7 (*)     Albumin 2.6 (*)     Total Bilirubin 1.9 (*)     eGFR if  18.8 (*)     eGFR if non  16.3 (*)     All other components within normal limits   URINALYSIS, REFLEX TO URINE CULTURE   TYPE & SCREEN   PREPARE RBC SOFT          Imaging Results    None          Medications   0.9%  NaCl infusion (for blood administration) (has no administration in time range)   haloperidol lactate injection 2.5 mg (2.5 mg Intravenous Given 5/6/22 0707)   diphenhydrAMINE injection 25 mg (25 mg Intravenous Given 5/6/22 0707)   pantoprazole injection 40 mg (40 mg Intravenous Given 5/6/22 0707)   0.9%  NaCl infusion (0 mLs Intravenous Stopped 5/6/22 0816)     Medical Decision Making:   Initial Assessment:   Patient presents to the ER for evaluation of abdominal pain with nausea.  She is feeling much better with treatment given in the ER.  She has had no vomiting during her ER stay.  Lab workup is remarkable for anemia.  She does have chronic anemia presumably related to her chronic kidney disease.  I did discuss her case with the hospitalist.  Patient has been given a unit of blood in the ER.  She has tolerated this well.  She is discharged home to follow-up with primary care.  Return to the ER for worsening symptoms.                      Clinical Impression:   Final diagnoses:  [K31.84] Gastroparesis (Primary)  [D64.9] Anemia, unspecified type          ED Disposition Condition    Discharge Stable        ED Prescriptions     Medication Sig Dispense Start Date End Date Auth. Provider    metoclopramide HCl (REGLAN) 10 MG tablet Take 1 tablet (10 mg total) by mouth every 6 (six) hours as needed (headache, nausea or vomiting). 30 tablet 5/6/2022  Dustin Donato MD        Follow-up  Information     Follow up With Specialties Details Why Contact Info    Your physician  In 1 week      [facility] Emergency Dept Emergency Medicine  If symptoms worsen [address] [facility] [address] [phone]           Dustin Donato MD  05/06/22 5679

## 2022-05-28 ENCOUNTER — HOSPITAL ENCOUNTER (INPATIENT)
Facility: HOSPITAL | Age: 32
LOS: 10 days | Discharge: HOME | DRG: 070 | End: 2022-06-07
Attending: EMERGENCY MEDICINE | Admitting: STUDENT IN AN ORGANIZED HEALTH CARE EDUCATION/TRAINING PROGRAM
Payer: MEDICAID

## 2022-05-28 DIAGNOSIS — R56.9 SEIZURE: ICD-10-CM

## 2022-05-28 DIAGNOSIS — R73.9 HYPERGLYCEMIA: ICD-10-CM

## 2022-05-28 DIAGNOSIS — E16.2 HYPOGLYCEMIA: Primary | ICD-10-CM

## 2022-05-28 DIAGNOSIS — I47.10 SVT (SUPRAVENTRICULAR TACHYCARDIA): ICD-10-CM

## 2022-05-28 DIAGNOSIS — I67.83 PRES (POSTERIOR REVERSIBLE ENCEPHALOPATHY SYNDROME): ICD-10-CM

## 2022-05-28 LAB
ALBUMIN SERPL BCP-MCNC: 2.6 G/DL (ref 3.5–5.2)
ALP SERPL-CCNC: 77 U/L (ref 55–135)
ALT SERPL W/O P-5'-P-CCNC: 23 U/L (ref 10–44)
AMMONIA PLAS-SCNC: 25 UMOL/L (ref 10–50)
AMPHET+METHAMPHET UR QL: NEGATIVE
ANION GAP SERPL CALC-SCNC: 13 MMOL/L (ref 8–16)
AST SERPL-CCNC: 27 U/L (ref 10–40)
B-HCG UR QL: NEGATIVE
BACTERIA #/AREA URNS HPF: ABNORMAL /HPF
BARBITURATES UR QL SCN>200 NG/ML: NEGATIVE
BASOPHILS # BLD AUTO: 0.03 K/UL (ref 0–0.2)
BASOPHILS NFR BLD: 0.4 % (ref 0–1.9)
BENZODIAZ UR QL SCN>200 NG/ML: NEGATIVE
BILIRUB SERPL-MCNC: 0.9 MG/DL (ref 0.1–1)
BILIRUB UR QL STRIP: NEGATIVE
BUN SERPL-MCNC: 46 MG/DL (ref 6–20)
BZE UR QL SCN: NEGATIVE
CALCIUM SERPL-MCNC: 8.9 MG/DL (ref 8.7–10.5)
CANNABINOIDS UR QL SCN: ABNORMAL
CHLORIDE SERPL-SCNC: 105 MMOL/L (ref 95–110)
CLARITY UR: CLEAR
CO2 SERPL-SCNC: 25 MMOL/L (ref 23–29)
COLOR UR: YELLOW
CREAT SERPL-MCNC: 3.9 MG/DL (ref 0.5–1.4)
CREAT UR-MCNC: 57.8 MG/DL (ref 15–325)
DIFFERENTIAL METHOD: ABNORMAL
EOSINOPHIL # BLD AUTO: 0.1 K/UL (ref 0–0.5)
EOSINOPHIL NFR BLD: 1.3 % (ref 0–8)
ERYTHROCYTE [DISTWIDTH] IN BLOOD BY AUTOMATED COUNT: 14.6 % (ref 11.5–14.5)
EST. GFR  (AFRICAN AMERICAN): 17 ML/MIN/1.73 M^2
EST. GFR  (NON AFRICAN AMERICAN): 14 ML/MIN/1.73 M^2
GLUCOSE SERPL-MCNC: 48 MG/DL (ref 70–110)
GLUCOSE UR QL STRIP: ABNORMAL
HCT VFR BLD AUTO: 33.9 % (ref 37–48.5)
HGB BLD-MCNC: 12.2 G/DL (ref 12–16)
HGB UR QL STRIP: ABNORMAL
HYALINE CASTS #/AREA URNS LPF: 0 /LPF
IMM GRANULOCYTES # BLD AUTO: 0.03 K/UL (ref 0–0.04)
IMM GRANULOCYTES NFR BLD AUTO: 0.4 % (ref 0–0.5)
KETONES UR QL STRIP: NEGATIVE
LEUKOCYTE ESTERASE UR QL STRIP: NEGATIVE
LIPASE SERPL-CCNC: 7 U/L (ref 4–60)
LYMPHOCYTES # BLD AUTO: 0.8 K/UL (ref 1–4.8)
LYMPHOCYTES NFR BLD: 11.3 % (ref 18–48)
MCH RBC QN AUTO: 29.9 PG (ref 27–31)
MCHC RBC AUTO-ENTMCNC: 36 G/DL (ref 32–36)
MCV RBC AUTO: 83 FL (ref 82–98)
METHADONE UR QL SCN>300 NG/ML: NEGATIVE
MICROSCOPIC COMMENT: ABNORMAL
MONOCYTES # BLD AUTO: 0.5 K/UL (ref 0.3–1)
MONOCYTES NFR BLD: 6.7 % (ref 4–15)
NEUTROPHILS # BLD AUTO: 5.5 K/UL (ref 1.8–7.7)
NEUTROPHILS NFR BLD: 79.9 % (ref 38–73)
NITRITE UR QL STRIP: NEGATIVE
NRBC BLD-RTO: 0 /100 WBC
OPIATES UR QL SCN: NEGATIVE
PCP UR QL SCN>25 NG/ML: NEGATIVE
PH UR STRIP: 7 [PH] (ref 5–8)
PLATELET # BLD AUTO: 125 K/UL (ref 150–450)
PMV BLD AUTO: 9 FL (ref 9.2–12.9)
POCT GLUCOSE: 121 MG/DL (ref 70–110)
POCT GLUCOSE: 41 MG/DL (ref 70–110)
POCT GLUCOSE: 56 MG/DL (ref 70–110)
POCT GLUCOSE: 72 MG/DL (ref 70–110)
POCT GLUCOSE: 78 MG/DL (ref 70–110)
POTASSIUM SERPL-SCNC: 3.5 MMOL/L (ref 3.5–5.1)
PROT SERPL-MCNC: 5.8 G/DL (ref 6–8.4)
PROT UR QL STRIP: ABNORMAL
RBC # BLD AUTO: 4.08 M/UL (ref 4–5.4)
RBC #/AREA URNS HPF: 5 /HPF (ref 0–4)
SARS-COV-2 RDRP RESP QL NAA+PROBE: NEGATIVE
SODIUM SERPL-SCNC: 143 MMOL/L (ref 136–145)
SP GR UR STRIP: 1.02 (ref 1–1.03)
TOXICOLOGY INFORMATION: ABNORMAL
URN SPEC COLLECT METH UR: ABNORMAL
UROBILINOGEN UR STRIP-ACNC: NEGATIVE EU/DL
WBC # BLD AUTO: 6.88 K/UL (ref 3.9–12.7)
WBC #/AREA URNS HPF: 1 /HPF (ref 0–5)
YEAST URNS QL MICRO: ABNORMAL

## 2022-05-28 PROCEDURE — 25000003 PHARM REV CODE 250

## 2022-05-28 PROCEDURE — 12000002 HC ACUTE/MED SURGE SEMI-PRIVATE ROOM

## 2022-05-28 PROCEDURE — 86803 HEPATITIS C AB TEST: CPT | Performed by: EMERGENCY MEDICINE

## 2022-05-28 PROCEDURE — 93005 ELECTROCARDIOGRAM TRACING: CPT

## 2022-05-28 PROCEDURE — 83690 ASSAY OF LIPASE: CPT | Performed by: EMERGENCY MEDICINE

## 2022-05-28 PROCEDURE — U0002 COVID-19 LAB TEST NON-CDC: HCPCS | Performed by: EMERGENCY MEDICINE

## 2022-05-28 PROCEDURE — 93010 EKG 12-LEAD: ICD-10-PCS | Mod: ,,, | Performed by: SPECIALIST

## 2022-05-28 PROCEDURE — 25000003 PHARM REV CODE 250: Performed by: EMERGENCY MEDICINE

## 2022-05-28 PROCEDURE — 82962 GLUCOSE BLOOD TEST: CPT

## 2022-05-28 PROCEDURE — 96375 TX/PRO/DX INJ NEW DRUG ADDON: CPT

## 2022-05-28 PROCEDURE — 81025 URINE PREGNANCY TEST: CPT | Performed by: EMERGENCY MEDICINE

## 2022-05-28 PROCEDURE — 82140 ASSAY OF AMMONIA: CPT | Performed by: EMERGENCY MEDICINE

## 2022-05-28 PROCEDURE — 81000 URINALYSIS NONAUTO W/SCOPE: CPT | Mod: 59 | Performed by: EMERGENCY MEDICINE

## 2022-05-28 PROCEDURE — 80053 COMPREHEN METABOLIC PANEL: CPT | Performed by: EMERGENCY MEDICINE

## 2022-05-28 PROCEDURE — 87389 HIV-1 AG W/HIV-1&-2 AB AG IA: CPT | Performed by: EMERGENCY MEDICINE

## 2022-05-28 PROCEDURE — 99291 CRITICAL CARE FIRST HOUR: CPT | Mod: 25

## 2022-05-28 PROCEDURE — 80307 DRUG TEST PRSMV CHEM ANLYZR: CPT | Performed by: EMERGENCY MEDICINE

## 2022-05-28 PROCEDURE — 63600175 PHARM REV CODE 636 W HCPCS: Performed by: EMERGENCY MEDICINE

## 2022-05-28 PROCEDURE — 85025 COMPLETE CBC W/AUTO DIFF WBC: CPT | Performed by: EMERGENCY MEDICINE

## 2022-05-28 PROCEDURE — 93010 ELECTROCARDIOGRAM REPORT: CPT | Mod: ,,, | Performed by: SPECIALIST

## 2022-05-28 PROCEDURE — 36415 COLL VENOUS BLD VENIPUNCTURE: CPT | Performed by: EMERGENCY MEDICINE

## 2022-05-28 PROCEDURE — 96365 THER/PROPH/DIAG IV INF INIT: CPT

## 2022-05-28 PROCEDURE — 96366 THER/PROPH/DIAG IV INF ADDON: CPT

## 2022-05-28 RX ORDER — DEXTROSE 50 % IN WATER (D50W) INTRAVENOUS SYRINGE
25
Status: DISCONTINUED | OUTPATIENT
Start: 2022-05-28 | End: 2022-05-28

## 2022-05-28 RX ORDER — DEXTROSE 50 % IN WATER (D50W) INTRAVENOUS SYRINGE
25
Status: COMPLETED | OUTPATIENT
Start: 2022-05-28 | End: 2022-05-28

## 2022-05-28 RX ORDER — LORAZEPAM 2 MG/ML
INJECTION INTRAMUSCULAR
Status: DISPENSED
Start: 2022-05-28 | End: 2022-05-29

## 2022-05-28 RX ORDER — DEXTROSE MONOHYDRATE 50 MG/ML
1000 INJECTION, SOLUTION INTRAVENOUS
Status: COMPLETED | OUTPATIENT
Start: 2022-05-28 | End: 2022-05-29

## 2022-05-28 RX ORDER — LEVETIRACETAM 10 MG/ML
1000 INJECTION INTRAVASCULAR
Status: DISCONTINUED | OUTPATIENT
Start: 2022-05-28 | End: 2022-05-28 | Stop reason: SDUPTHER

## 2022-05-28 RX ORDER — METOCLOPRAMIDE HYDROCHLORIDE 5 MG/ML
10 INJECTION INTRAMUSCULAR; INTRAVENOUS
Status: COMPLETED | OUTPATIENT
Start: 2022-05-28 | End: 2022-05-28

## 2022-05-28 RX ORDER — LORAZEPAM 2 MG/ML
2 INJECTION INTRAMUSCULAR
Status: COMPLETED | OUTPATIENT
Start: 2022-05-28 | End: 2022-05-28

## 2022-05-28 RX ORDER — LEVETIRACETAM 500 MG/5ML
1000 INJECTION, SOLUTION, CONCENTRATE INTRAVENOUS
Status: COMPLETED | OUTPATIENT
Start: 2022-05-29 | End: 2022-05-28

## 2022-05-28 RX ADMIN — DEXTROSE 1000 ML: 5 SOLUTION INTRAVENOUS at 10:05

## 2022-05-28 RX ADMIN — DEXTROSE MONOHYDRATE 50 ML: 25 INJECTION, SOLUTION INTRAVENOUS at 10:05

## 2022-05-28 RX ADMIN — DEXTROSE MONOHYDRATE 25 G: 25 INJECTION, SOLUTION INTRAVENOUS at 10:05

## 2022-05-28 RX ADMIN — DEXTROSE MONOHYDRATE 25 G: 25 INJECTION, SOLUTION INTRAVENOUS at 09:05

## 2022-05-28 RX ADMIN — LORAZEPAM 2 MG: 2 INJECTION INTRAMUSCULAR at 11:05

## 2022-05-28 RX ADMIN — METOCLOPRAMIDE 10 MG: 5 INJECTION, SOLUTION INTRAMUSCULAR; INTRAVENOUS at 09:05

## 2022-05-28 RX ADMIN — LEVETIRACETAM 1000 MG: 100 INJECTION, SOLUTION INTRAVENOUS at 11:05

## 2022-05-29 PROBLEM — R56.9 SEIZURE: Status: ACTIVE | Noted: 2022-05-29

## 2022-05-29 LAB
ALBUMIN SERPL BCP-MCNC: 2.4 G/DL (ref 3.5–5.2)
ALP SERPL-CCNC: 73 U/L (ref 55–135)
ALT SERPL W/O P-5'-P-CCNC: 25 U/L (ref 10–44)
ANION GAP SERPL CALC-SCNC: 12 MMOL/L (ref 8–16)
AST SERPL-CCNC: 34 U/L (ref 10–40)
BASOPHILS # BLD AUTO: 0.02 K/UL (ref 0–0.2)
BASOPHILS NFR BLD: 0.2 % (ref 0–1.9)
BILIRUB SERPL-MCNC: 0.8 MG/DL (ref 0.1–1)
BUN SERPL-MCNC: 46 MG/DL (ref 6–20)
CALCIUM SERPL-MCNC: 8.6 MG/DL (ref 8.7–10.5)
CHLORIDE SERPL-SCNC: 105 MMOL/L (ref 95–110)
CO2 SERPL-SCNC: 24 MMOL/L (ref 23–29)
CREAT SERPL-MCNC: 3.7 MG/DL (ref 0.5–1.4)
DIFFERENTIAL METHOD: ABNORMAL
EOSINOPHIL # BLD AUTO: 0 K/UL (ref 0–0.5)
EOSINOPHIL NFR BLD: 0.3 % (ref 0–8)
ERYTHROCYTE [DISTWIDTH] IN BLOOD BY AUTOMATED COUNT: 14.6 % (ref 11.5–14.5)
EST. GFR  (AFRICAN AMERICAN): 18 ML/MIN/1.73 M^2
EST. GFR  (NON AFRICAN AMERICAN): 15 ML/MIN/1.73 M^2
ETHANOL SERPL-MCNC: <10 MG/DL
GLUCOSE SERPL-MCNC: 108 MG/DL (ref 70–110)
HCT VFR BLD AUTO: 34.4 % (ref 37–48.5)
HGB BLD-MCNC: 11.9 G/DL (ref 12–16)
IMM GRANULOCYTES # BLD AUTO: 0.06 K/UL (ref 0–0.04)
IMM GRANULOCYTES NFR BLD AUTO: 0.6 % (ref 0–0.5)
LYMPHOCYTES # BLD AUTO: 0.7 K/UL (ref 1–4.8)
LYMPHOCYTES NFR BLD: 6.2 % (ref 18–48)
MAGNESIUM SERPL-MCNC: 2 MG/DL (ref 1.6–2.6)
MCH RBC QN AUTO: 28.7 PG (ref 27–31)
MCHC RBC AUTO-ENTMCNC: 34.6 G/DL (ref 32–36)
MCV RBC AUTO: 83 FL (ref 82–98)
MONOCYTES # BLD AUTO: 0.7 K/UL (ref 0.3–1)
MONOCYTES NFR BLD: 7 % (ref 4–15)
NEUTROPHILS # BLD AUTO: 9 K/UL (ref 1.8–7.7)
NEUTROPHILS NFR BLD: 85.7 % (ref 38–73)
NRBC BLD-RTO: 0 /100 WBC
PHOSPHATE SERPL-MCNC: 3.9 MG/DL (ref 2.7–4.5)
PLATELET # BLD AUTO: 107 K/UL (ref 150–450)
PMV BLD AUTO: 9.4 FL (ref 9.2–12.9)
POCT GLUCOSE: 106 MG/DL (ref 70–110)
POCT GLUCOSE: 113 MG/DL (ref 70–110)
POCT GLUCOSE: 128 MG/DL (ref 70–110)
POCT GLUCOSE: 139 MG/DL (ref 70–110)
POCT GLUCOSE: 158 MG/DL (ref 70–110)
POCT GLUCOSE: 178 MG/DL (ref 70–110)
POCT GLUCOSE: 203 MG/DL (ref 70–110)
POCT GLUCOSE: 89 MG/DL (ref 70–110)
POTASSIUM SERPL-SCNC: 3.7 MMOL/L (ref 3.5–5.1)
PROT SERPL-MCNC: 5.6 G/DL (ref 6–8.4)
RBC # BLD AUTO: 4.15 M/UL (ref 4–5.4)
SODIUM SERPL-SCNC: 141 MMOL/L (ref 136–145)
WBC # BLD AUTO: 10.46 K/UL (ref 3.9–12.7)

## 2022-05-29 PROCEDURE — 95816 EEG AWAKE AND DROWSY: CPT | Mod: 26,,, | Performed by: PSYCHIATRY & NEUROLOGY

## 2022-05-29 PROCEDURE — 63600175 PHARM REV CODE 636 W HCPCS: Performed by: INTERNAL MEDICINE

## 2022-05-29 PROCEDURE — 25000003 PHARM REV CODE 250: Performed by: STUDENT IN AN ORGANIZED HEALTH CARE EDUCATION/TRAINING PROGRAM

## 2022-05-29 PROCEDURE — 36410 VNPNXR 3YR/> PHY/QHP DX/THER: CPT

## 2022-05-29 PROCEDURE — 63600175 PHARM REV CODE 636 W HCPCS: Performed by: EMERGENCY MEDICINE

## 2022-05-29 PROCEDURE — 80053 COMPREHEN METABOLIC PANEL: CPT | Performed by: NURSE PRACTITIONER

## 2022-05-29 PROCEDURE — 63600175 PHARM REV CODE 636 W HCPCS: Performed by: STUDENT IN AN ORGANIZED HEALTH CARE EDUCATION/TRAINING PROGRAM

## 2022-05-29 PROCEDURE — 84100 ASSAY OF PHOSPHORUS: CPT | Performed by: NURSE PRACTITIONER

## 2022-05-29 PROCEDURE — 25000003 PHARM REV CODE 250: Performed by: INTERNAL MEDICINE

## 2022-05-29 PROCEDURE — 83735 ASSAY OF MAGNESIUM: CPT | Performed by: NURSE PRACTITIONER

## 2022-05-29 PROCEDURE — 20000000 HC ICU ROOM

## 2022-05-29 PROCEDURE — 94761 N-INVAS EAR/PLS OXIMETRY MLT: CPT

## 2022-05-29 PROCEDURE — 82077 ASSAY SPEC XCP UR&BREATH IA: CPT | Performed by: INTERNAL MEDICINE

## 2022-05-29 PROCEDURE — 27000221 HC OXYGEN, UP TO 24 HOURS

## 2022-05-29 PROCEDURE — 85025 COMPLETE CBC W/AUTO DIFF WBC: CPT | Performed by: NURSE PRACTITIONER

## 2022-05-29 PROCEDURE — C9113 INJ PANTOPRAZOLE SODIUM, VIA: HCPCS | Performed by: STUDENT IN AN ORGANIZED HEALTH CARE EDUCATION/TRAINING PROGRAM

## 2022-05-29 PROCEDURE — C9399 UNCLASSIFIED DRUGS OR BIOLOG: HCPCS | Performed by: INTERNAL MEDICINE

## 2022-05-29 PROCEDURE — C1751 CATH, INF, PER/CENT/MIDLINE: HCPCS

## 2022-05-29 PROCEDURE — 63600175 PHARM REV CODE 636 W HCPCS: Performed by: NURSE PRACTITIONER

## 2022-05-29 PROCEDURE — 95816 PR EEG,W/AWAKE & DROWSY RECORD: ICD-10-PCS | Mod: 26,,, | Performed by: PSYCHIATRY & NEUROLOGY

## 2022-05-29 RX ORDER — LANOLIN ALCOHOL/MO/W.PET/CERES
800 CREAM (GRAM) TOPICAL
Status: DISCONTINUED | OUTPATIENT
Start: 2022-05-29 | End: 2022-06-07 | Stop reason: HOSPADM

## 2022-05-29 RX ORDER — DEXMEDETOMIDINE HYDROCHLORIDE 4 UG/ML
0-1 INJECTION INTRAVENOUS CONTINUOUS
Status: DISCONTINUED | OUTPATIENT
Start: 2022-05-29 | End: 2022-05-29

## 2022-05-29 RX ORDER — SODIUM CHLORIDE 0.9 % (FLUSH) 0.9 %
10 SYRINGE (ML) INJECTION EVERY 8 HOURS PRN
Status: DISCONTINUED | OUTPATIENT
Start: 2022-05-29 | End: 2022-06-07 | Stop reason: HOSPADM

## 2022-05-29 RX ORDER — TALC
9 POWDER (GRAM) TOPICAL NIGHTLY PRN
Status: DISCONTINUED | OUTPATIENT
Start: 2022-05-29 | End: 2022-06-07 | Stop reason: HOSPADM

## 2022-05-29 RX ORDER — EPINEPHRINE 0.3 MG/.3ML
0.3 INJECTION SUBCUTANEOUS
Status: DISCONTINUED | OUTPATIENT
Start: 2022-05-29 | End: 2022-06-04

## 2022-05-29 RX ORDER — PANTOPRAZOLE SODIUM 40 MG/10ML
40 INJECTION, POWDER, LYOPHILIZED, FOR SOLUTION INTRAVENOUS DAILY
Status: DISCONTINUED | OUTPATIENT
Start: 2022-05-29 | End: 2022-06-07 | Stop reason: HOSPADM

## 2022-05-29 RX ORDER — ACETAMINOPHEN 500 MG
1000 TABLET ORAL EVERY 6 HOURS PRN
Status: DISCONTINUED | OUTPATIENT
Start: 2022-05-29 | End: 2022-06-07 | Stop reason: HOSPADM

## 2022-05-29 RX ORDER — IPRATROPIUM BROMIDE AND ALBUTEROL SULFATE 2.5; .5 MG/3ML; MG/3ML
3 SOLUTION RESPIRATORY (INHALATION) EVERY 4 HOURS PRN
Status: DISCONTINUED | OUTPATIENT
Start: 2022-05-29 | End: 2022-06-07 | Stop reason: HOSPADM

## 2022-05-29 RX ORDER — SIMETHICONE 80 MG
1 TABLET,CHEWABLE ORAL 4 TIMES DAILY PRN
Status: DISCONTINUED | OUTPATIENT
Start: 2022-05-29 | End: 2022-06-07 | Stop reason: HOSPADM

## 2022-05-29 RX ORDER — ONDANSETRON 2 MG/ML
4 INJECTION INTRAMUSCULAR; INTRAVENOUS EVERY 8 HOURS PRN
Status: DISCONTINUED | OUTPATIENT
Start: 2022-05-29 | End: 2022-06-07 | Stop reason: HOSPADM

## 2022-05-29 RX ORDER — IBUPROFEN 200 MG
16 TABLET ORAL
Status: DISCONTINUED | OUTPATIENT
Start: 2022-05-29 | End: 2022-06-07 | Stop reason: HOSPADM

## 2022-05-29 RX ORDER — LORAZEPAM 2 MG/ML
2 INJECTION INTRAMUSCULAR ONCE AS NEEDED
Status: COMPLETED | OUTPATIENT
Start: 2022-05-29 | End: 2022-05-29

## 2022-05-29 RX ORDER — SODIUM,POTASSIUM PHOSPHATES 280-250MG
2 POWDER IN PACKET (EA) ORAL
Status: DISCONTINUED | OUTPATIENT
Start: 2022-05-29 | End: 2022-06-07 | Stop reason: HOSPADM

## 2022-05-29 RX ORDER — DIPHENHYDRAMINE HYDROCHLORIDE 50 MG/ML
25 INJECTION INTRAMUSCULAR; INTRAVENOUS ONCE
Status: COMPLETED | OUTPATIENT
Start: 2022-05-29 | End: 2022-05-29

## 2022-05-29 RX ORDER — ETOMIDATE 2 MG/ML
INJECTION INTRAVENOUS
Status: DISPENSED
Start: 2022-05-29 | End: 2022-05-29

## 2022-05-29 RX ORDER — MUPIROCIN 20 MG/G
OINTMENT TOPICAL 2 TIMES DAILY
Status: COMPLETED | OUTPATIENT
Start: 2022-05-29 | End: 2022-06-02

## 2022-05-29 RX ORDER — ACETAMINOPHEN 325 MG/1
650 TABLET ORAL EVERY 6 HOURS PRN
Status: DISCONTINUED | OUTPATIENT
Start: 2022-05-29 | End: 2022-05-29

## 2022-05-29 RX ORDER — MAG HYDROX/ALUMINUM HYD/SIMETH 200-200-20
30 SUSPENSION, ORAL (FINAL DOSE FORM) ORAL 4 TIMES DAILY PRN
Status: DISCONTINUED | OUTPATIENT
Start: 2022-05-29 | End: 2022-06-07 | Stop reason: HOSPADM

## 2022-05-29 RX ORDER — FAMOTIDINE 10 MG/ML
20 INJECTION INTRAVENOUS EVERY 4 HOURS PRN
Status: DISCONTINUED | OUTPATIENT
Start: 2022-05-29 | End: 2022-06-02

## 2022-05-29 RX ORDER — ROCURONIUM BROMIDE 10 MG/ML
INJECTION, SOLUTION INTRAVENOUS
Status: DISPENSED
Start: 2022-05-29 | End: 2022-05-29

## 2022-05-29 RX ORDER — INSULIN ASPART 100 [IU]/ML
0-5 INJECTION, SOLUTION INTRAVENOUS; SUBCUTANEOUS EVERY 6 HOURS PRN
Status: DISCONTINUED | OUTPATIENT
Start: 2022-05-29 | End: 2022-06-07 | Stop reason: HOSPADM

## 2022-05-29 RX ORDER — DIPHENHYDRAMINE HYDROCHLORIDE 50 MG/ML
25 INJECTION INTRAMUSCULAR; INTRAVENOUS EVERY 4 HOURS PRN
Status: DISCONTINUED | OUTPATIENT
Start: 2022-05-29 | End: 2022-06-02

## 2022-05-29 RX ORDER — NICARDIPINE HYDROCHLORIDE 0.2 MG/ML
0-15 INJECTION INTRAVENOUS CONTINUOUS
Status: DISCONTINUED | OUTPATIENT
Start: 2022-05-29 | End: 2022-06-05

## 2022-05-29 RX ORDER — NALOXONE HCL 0.4 MG/ML
0.02 VIAL (ML) INJECTION
Status: DISCONTINUED | OUTPATIENT
Start: 2022-05-29 | End: 2022-06-07 | Stop reason: HOSPADM

## 2022-05-29 RX ORDER — DEXTROSE MONOHYDRATE 100 MG/ML
INJECTION, SOLUTION INTRAVENOUS CONTINUOUS
Status: DISCONTINUED | OUTPATIENT
Start: 2022-05-29 | End: 2022-05-29

## 2022-05-29 RX ORDER — IBUPROFEN 200 MG
24 TABLET ORAL
Status: DISCONTINUED | OUTPATIENT
Start: 2022-05-29 | End: 2022-06-07 | Stop reason: HOSPADM

## 2022-05-29 RX ORDER — SUCCINYLCHOLINE CHLORIDE 20 MG/ML
INJECTION INTRAMUSCULAR; INTRAVENOUS
Status: DISPENSED
Start: 2022-05-29 | End: 2022-05-29

## 2022-05-29 RX ORDER — FAMOTIDINE 10 MG/ML
20 INJECTION INTRAVENOUS ONCE
Status: COMPLETED | OUTPATIENT
Start: 2022-05-29 | End: 2022-05-29

## 2022-05-29 RX ORDER — DEXTROSE MONOHYDRATE 50 MG/ML
INJECTION, SOLUTION INTRAVENOUS CONTINUOUS
Status: DISCONTINUED | OUTPATIENT
Start: 2022-05-29 | End: 2022-05-30

## 2022-05-29 RX ORDER — ACETAMINOPHEN 325 MG/1
650 TABLET ORAL EVERY 4 HOURS PRN
Status: DISCONTINUED | OUTPATIENT
Start: 2022-05-29 | End: 2022-05-29

## 2022-05-29 RX ORDER — DEXMEDETOMIDINE HYDROCHLORIDE 4 UG/ML
INJECTION INTRAVENOUS
Status: DISPENSED
Start: 2022-05-29 | End: 2022-05-29

## 2022-05-29 RX ORDER — HYDRALAZINE HYDROCHLORIDE 20 MG/ML
10 INJECTION INTRAMUSCULAR; INTRAVENOUS EVERY 6 HOURS PRN
Status: DISCONTINUED | OUTPATIENT
Start: 2022-05-29 | End: 2022-05-30

## 2022-05-29 RX ORDER — LORAZEPAM 2 MG/ML
2 INJECTION INTRAMUSCULAR ONCE
Status: COMPLETED | OUTPATIENT
Start: 2022-05-29 | End: 2022-05-29

## 2022-05-29 RX ORDER — PROPOFOL 10 MG/ML
INJECTION, EMULSION INTRAVENOUS
Status: DISPENSED
Start: 2022-05-29 | End: 2022-05-29

## 2022-05-29 RX ORDER — GLUCAGON 1 MG
1 KIT INJECTION
Status: DISCONTINUED | OUTPATIENT
Start: 2022-05-29 | End: 2022-06-07 | Stop reason: HOSPADM

## 2022-05-29 RX ADMIN — NICARDIPINE HYDROCHLORIDE 2.5 MG/HR: 0.2 INJECTION, SOLUTION INTRAVENOUS at 10:05

## 2022-05-29 RX ADMIN — FAMOTIDINE 20 MG: 10 INJECTION, SOLUTION INTRAVENOUS at 06:05

## 2022-05-29 RX ADMIN — NICARDIPINE HYDROCHLORIDE 5 MG/HR: 0.2 INJECTION, SOLUTION INTRAVENOUS at 08:05

## 2022-05-29 RX ADMIN — HYDRALAZINE HYDROCHLORIDE 10 MG: 20 INJECTION INTRAMUSCULAR; INTRAVENOUS at 02:05

## 2022-05-29 RX ADMIN — DIPHENHYDRAMINE HYDROCHLORIDE 25 MG: 50 INJECTION, SOLUTION INTRAMUSCULAR; INTRAVENOUS at 06:05

## 2022-05-29 RX ADMIN — DEXTROSE: 5 SOLUTION INTRAVENOUS at 02:05

## 2022-05-29 RX ADMIN — HYDROCORTISONE SODIUM SUCCINATE 125 MG: 100 INJECTION, POWDER, FOR SOLUTION INTRAMUSCULAR; INTRAVENOUS at 06:05

## 2022-05-29 RX ADMIN — MUPIROCIN: 20 OINTMENT TOPICAL at 08:05

## 2022-05-29 RX ADMIN — DEXMEDETOMIDINE HYDROCHLORIDE 0.1 MCG/KG/HR: 4 INJECTION INTRAVENOUS at 02:05

## 2022-05-29 RX ADMIN — INSULIN ASPART 1 UNITS: 100 INJECTION, SOLUTION INTRAVENOUS; SUBCUTANEOUS at 11:05

## 2022-05-29 RX ADMIN — DEXTROSE: 5 SOLUTION INTRAVENOUS at 11:05

## 2022-05-29 RX ADMIN — PANTOPRAZOLE SODIUM 40 MG: 40 INJECTION, POWDER, LYOPHILIZED, FOR SOLUTION INTRAVENOUS at 08:05

## 2022-05-29 RX ADMIN — LORAZEPAM 2 MG: 2 INJECTION INTRAMUSCULAR; INTRAVENOUS at 04:05

## 2022-05-29 RX ADMIN — DEXTROSE: 10 SOLUTION INTRAVENOUS at 02:05

## 2022-05-29 RX ADMIN — HYDRALAZINE HYDROCHLORIDE 10 MG: 20 INJECTION INTRAMUSCULAR; INTRAVENOUS at 08:05

## 2022-05-29 RX ADMIN — DIPHENHYDRAMINE HYDROCHLORIDE 25 MG: 50 INJECTION, SOLUTION INTRAMUSCULAR; INTRAVENOUS at 03:05

## 2022-05-29 NOTE — EICU
Brief New eICU admit Note:  32-year-old female presents emergency room for evaluation of seizure and hypoglycemia.  Patient has a history of diabetes and gastroparesis.  She reports that she has been taking her insulin but not able to hold any food down due to nausea and vomiting.    PMHx:  anemia, CKD, type 2 diabetes, gallstones, CKD, chronic diastolic heart failure, and gastroparesis.  ER workup:  CBC with thrombocytopenia of 125.  Original glucose on CMP was 48. BUN 46 and creatinine of 3.9 (baseline).  Urinalysis with 5 red blood cells and rare yeast.  CT the head was negative.  Patient was given Ativan status post seizure as well as given a Keppra loading dose    Camera:  Agitated, trying to climb out of bed. On room air. Confused. Able to protect airways.  HR 86, hypertensive in ED and here. Asking for BPO meds, restraints and sedation. Got ativan from ED for tonic clonic sz.  's now in ICU. Getting 5%continuous.     Data:  As above.   Upper trapezius hematoma on CT head. N 76%.  Alb 2.6, LFT normal. amonia 25. HbA1C 5.8  Preg neg.   UA neg.  Ultrasound from previous: mild left hydronephroses and hepatic steatosis.  Covid neg  AG 13, co2 at 25.    A/P:    1. 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 Tox-THC +. Encephalopathy. Not on metformin at home.  - sz,fall and aspiration precautions  - started D10 at 25 ml/hr and continue D5 at 100 ml/hr, hrly CBG for now.  - on Keppra, prn ativan.  - start Precedex gtt for agitation. Watch for adi and hypotension.  - get alcohol level.   - Neurology consultation. . If mentation not better, consider MRI.  - as per ED notes, found in bed. Hematoma could be local trauma from sz activity. unlikely will have any spine fracture.   - if encephalopathy worsens or low GCS- low thresh hold for intubation.     2. HTN/CKD/diastolic chf( stable).  - Hydralazine 10 mg Intravenous prn for SBP > 160.    3. DM  - hold all meds.    4. VTE: SCD for now. Consider SQ heparin if  not better by tomorrow.

## 2022-05-29 NOTE — PLAN OF CARE
Patient came to the unit about 0120.  Obtunded/nonverbal.  Patient was restless and agitated trying to climb out of the bed and moaning/screaming.  Precedex and restraints were ordered.  Patient had generalized swelling of the face, neck, and upper chest with periorbital and scleral edema.  Lips were swollen as well.  As prior note stated, evaluated for intubation to protect airway.  No change in status as of shift change.  Blood glucose monitored.  It is continuing to dip.  Safety maintained.

## 2022-05-29 NOTE — PLAN OF CARE
[facility]  Initial Discharge Assessment       Primary Care Provider: Primary Doctor No    Admission Diagnosis: Seizure [R56.9]  Hypoglycemia [E16.2]    Admission Date: 5/28/2022  Expected Discharge Date:          Payor: MEDICAID / Plan: [place] / Product Type: Managed Medicaid /     Extended Emergency Contact Information  Primary Emergency Contact: Garcia Howell  Mobile Phone: [phone]  Relation: Father  Preferred language: English   needed? No  Secondary Emergency Contact: arlen howell  Mobile Phone: [phone]  Relation: Sister  Preferred language: English   needed? No    Discharge Plan A: Home with family  Discharge Plan B: Home      Pt not available for DC assessment due to testing. CM called pt's father, Garcia, to complete DC assessment. Garcia verified information on facesheet as correct. Reports that pt lives at listed address with her 3 dependent children. Verified PCP and pharm. Denies hh/hd. DME- glucometer. Reports that pt is normally independent with activities. Reports that pt usually able to drive herself to apts. Reports that either himself or his wife will provide transportation home upon DC. Verified insurance on file. Reports that pt recently admitted at [facility]- unsure if pt has been taking medications correctly. DC plan is home. CM following    Initial Assessment (most recent)     Adult Discharge Assessment - 05/29/22 1557        Discharge Assessment    Assessment Type Discharge Planning Assessment     Confirmed/corrected address, phone number and insurance Yes     Confirmed Demographics Correct on Facesheet     Source of Information family     If unable to respond/provide information was family/caregiver contacted? Yes     Contact Name/Number Garcia dillon     Communicated TATIANA with patient/caregiver Date not available/Unable to determine     Reason For Admission seizure     Lives With child(tammy), dependent      Facility Arrived From: home     Prior to hospitilization cognitive status: Alert/Oriented     Walking or Climbing Stairs Difficulty none     Dressing/Bathing Difficulty none     Equipment Currently Used at Home glucometer     Readmission within 30 days? No     Patient currently being followed by outpatient case management? No     Do you currently have service(s) that help you manage your care at home? No     Do you take prescription medications? Yes     Do you have prescription coverage? Yes     Coverage medicaid     Do you have any problems affording any of your prescribed medications? No     Who is going to help you get home at discharge? father     How do you get to doctors appointments? family or friend will provide;car, drives self     Are you on dialysis? No     Do you take coumadin? No     Discharge Plan A Home with family     Discharge Plan B Home     DME Needed Upon Discharge  none     Discharge Plan discussed with: Parent(s)

## 2022-05-29 NOTE — NURSING
Pt no longer pulling lines, mostly obtunded off of precedex. Able to be redirected. Restraints removed safely. No injuries noted. Will cont to monitor.

## 2022-05-29 NOTE — PLAN OF CARE
Pt remains in ICU. Cardene gtt infusing-titrated for MAP <104. D5W @ 75 infusing. BG stable throughout day. Pt able to tell me name only., intermittently follows commands. MRI and EEG today. PRN Ativan given for anxiety in MRI scan-see note. POC reviewed with Tanya, mother and pt. Seizure precautions maintained, pt with no seizures throughout day. Safety checks done. Will cont to monitor and report off to oncoming nurse to assume care.     Problem: Adult Inpatient Plan of Care  Goal: Patient-Specific Goal (Individualized)  Outcome: Ongoing, Progressing     Problem: Adult Inpatient Plan of Care  Goal: Absence of Hospital-Acquired Illness or Injury  Outcome: Ongoing, Progressing

## 2022-05-29 NOTE — ASSESSMENT & PLAN NOTE
Chronic  Creatine stable for now. BMP reviewed- noted CrCl cannot be calculated (Unknown ideal weight.). according to latest data. Monitor UOP and serial BMP and adjust therapy as needed. Renally dose meds.

## 2022-05-29 NOTE — NURSING
ED physician called to bedside by EICU for need of intubation d/t a floppy airway, snoring, and edematous face, neck, and chest with concerns of airway compromise.  ED physician evaluated the patient and stated there was no need for the patient to be intubated at this time.  Will continue to monitor the patient for signs of distress.

## 2022-05-29 NOTE — ASSESSMENT & PLAN NOTE
Chronic  Patient is identified as having Diastolic (HFpEF) heart failure that is Chronic. CHF is currently controlled. Latest ECHO performed and demonstrates- No results found for this or any previous visit.  . Continue Furosemide and monitor clinical status closely. Monitor on telemetry. Patient is off CHF pathway.  Monitor strict Is&Os and daily weights.  Place on fluid restriction of 1.5 L. Continue to stress to patient importance of self efficacy and  on diet for CHF. Last BNP reviewed- and noted below No results for input(s): BNP, BNPTRIAGEBLO in the last 168 hours..

## 2022-05-29 NOTE — NURSING
[facility] cath was POA and placed at [facility] a few weeks ago for urinary retention.

## 2022-05-29 NOTE — ED PROVIDER NOTES
Encounter Date: 2022    SCRIBE #1 NOTE: I, Biancatrevor Givens, am scribing for, and in the presence of, Bryan Brooke MD.       History     Chief Complaint   Patient presents with    Seizures     Found in bed with blood on pillow and mouth trauma     Time seen by provider: 8:24 PM on 2022    Tabby Howard is a 32 y.o. female who presents to the ED via EMS with a possible seizure that occurred PTA. Pt's family found pt with seizure like activity for 2 minutes and blood on the pt's pillow. Pt does not have a Hx of seizures. The patient denies any other symptoms at this time. PMHx of DM, SVT, GERD, heart failure with preserved ejection fraction, sickle cell trait, gastroparesis, and CKD. PSHx of esophagogastroduodenoscopy and  section.     The history is provided by the EMS personnel, the patient and medical records.     Review of patient's allergies indicates:   Allergen Reactions    Penicillins Hives     Past Medical History:   Diagnosis Date    CKD (chronic kidney disease), stage IV 2022    Diabetes mellitus due to underlying condition with unspecified complications 2022    Gastroparesis 2022    Heart failure with preserved ejection fraction 2022    EF 55% on 3/22    History of gastroesophageal reflux (GERD)     History of supraventricular tachycardia     Sickle cell trait 2022    Type 2 diabetes mellitus      Past Surgical History:   Procedure Laterality Date     SECTION      x 3    ESOPHAGOGASTRODUODENOSCOPY N/A 10/18/2019    Procedure: ESOPHAGOGASTRODUODENOSCOPY (EGD);  Surgeon: Gianluca Mendez MD;  Location: [facility];  Service: Endoscopy;  Laterality: N/A;     Family History   Problem Relation Age of Onset    Diabetes Mother     Diabetes Father      Social History     Tobacco Use    Smoking status: Never Smoker    Smokeless tobacco: Never Used   Substance Use Topics    Alcohol use: No    Drug use: No     Review of Systems   Unable to perform  ROS: Mental status change   Neurological: Positive for seizures.       Physical Exam     Initial Vitals   BP Pulse Resp Temp SpO2   05/28/22 2030 05/28/22 2030 -- 05/28/22 2100 05/28/22 2030   (!) 182/121 99  97.5 °F (36.4 °C) 98 %      MAP       --                Physical Exam    Nursing note and vitals reviewed.  Constitutional: She appears well-developed and well-nourished. She is not diaphoretic. No distress.   General edema and anasarca noted.    HENT:   Head: Normocephalic and atraumatic.   Dry blood in lips.    Eyes: EOM are normal. Pupils are equal, round, and reactive to light.   Neck: Neck supple.   Normal range of motion.  Cardiovascular: Normal rate, regular rhythm, normal heart sounds and intact distal pulses. Exam reveals no gallop and no friction rub.    No murmur heard.  Pulmonary/Chest: Breath sounds normal. No respiratory distress. She has no wheezes. She has no rhonchi. She has no rales.   Abdominal: Abdomen is soft. Bowel sounds are normal. There is no abdominal tenderness. There is no rebound and no guarding.   Genitourinary:    Genitourinary Comments: Pelayo in place.      Musculoskeletal:         General: Normal range of motion.      Cervical back: Normal range of motion and neck supple.     Neurological: She is alert and oriented to person, place, and time.   Moving all extremities not to commands.    Skin: Skin is warm and dry.   Psychiatric: She has a normal mood and affect. Her behavior is normal. Judgment and thought content normal.   Altered, but follows simple commands. Doesn't answer questions.         ED Course   Critical Care  Performed by: Bryan Brooke MD  Authorized by: Bryan Brooke MD   Direct patient critical care time: 19 minutes  Ordering / reviewing critical care time: 14 minutes  Documentation critical care time: 15 minutes  Total critical care time (exclusive of procedural time) : 48 minutes  Critical care was time spent personally by me on the following  activities: development of treatment plan with patient or surrogate, examination of patient, ordering and performing treatments and interventions, ordering and review of laboratory studies, obtaining history from patient or surrogate, ordering and review of radiographic studies, evaluation of patient's response to treatment and re-evaluation of patient's condition.        Labs Reviewed   COMPREHENSIVE METABOLIC PANEL - Abnormal; Notable for the following components:       Result Value    Glucose 48 (*)     BUN 46 (*)     Creatinine 3.9 (*)     Total Protein 5.8 (*)     Albumin 2.6 (*)     eGFR if  17 (*)     eGFR if non  14 (*)     All other components within normal limits    Narrative:     Release to patient->Immediate  Glucose  critical result(s) called and verbal readback obtained from   Yordan Morgan RN. by LEATHA 05/28/2022 21:12   CBC W/ AUTO DIFFERENTIAL - Abnormal; Notable for the following components:    Hematocrit 33.9 (*)     RDW 14.6 (*)     Platelets 125 (*)     MPV 9.0 (*)     Lymph # 0.8 (*)     Gran % 79.9 (*)     Lymph % 11.3 (*)     All other components within normal limits    Narrative:     Release to patient->Immediate   URINALYSIS, REFLEX TO URINE CULTURE - Abnormal; Notable for the following components:    Protein, UA 3+ (*)     Glucose, UA 2+ (*)     Occult Blood UA 2+ (*)     All other components within normal limits    Narrative:     Specimen Source->Urine   DRUG SCREEN PANEL, URINE EMERGENCY - Abnormal; Notable for the following components:    THC Presumptive Positive (*)     All other components within normal limits    Narrative:     Specimen Source->Urine   URINALYSIS MICROSCOPIC - Abnormal; Notable for the following components:    RBC, UA 5 (*)     Yeast, UA Rare (*)     All other components within normal limits    Narrative:     Specimen Source->Urine   POCT GLUCOSE - Abnormal; Notable for the following components:    POCT Glucose 41 (*)     All other  components within normal limits   POCT GLUCOSE - Abnormal; Notable for the following components:    POCT Glucose 56 (*)     All other components within normal limits   POCT GLUCOSE - Abnormal; Notable for the following components:    POCT Glucose 121 (*)     All other components within normal limits   LIPASE    Narrative:     Release to patient->Immediate   PREGNANCY TEST, URINE RAPID    Narrative:     Specimen Source->Urine   AMMONIA   SARS-COV-2 RNA AMPLIFICATION, QUAL   HIV 1 / 2 ANTIBODY   HEPATITIS C ANTIBODY   POCT GLUCOSE   POCT GLUCOSE   POCT GLUCOSE MONITORING CONTINUOUS   POCT GLUCOSE MONITORING CONTINUOUS     EKG Readings: (Independently Interpreted)   Rhythm: Sinus Tachycardia. Heart Rate: 104.   Low voltage QRS noted throughout. No acute ST segment changes.       Imaging Results          CT Head Without Contrast (Final result)  Result time 05/28/22 21:10:20    Final result by Gianluca Juarez MD (05/28/22 21:10:20)                 Impression:      Negative CT of the brain for hemorrhage mass or infarction.    Small area of high density posterior to the trapezius muscle.  Correlate for posterior neck skull junction soft tissue hematoma.      Electronically signed by: Gianluca Juarez  Date:    05/28/2022  Time:    21:10             Narrative:    EXAMINATION:  CT HEAD WITHOUT CONTRAST    CLINICAL HISTORY:  Seizure, new-onset, no history of trauma;    TECHNIQUE:  Low dose axial images were obtained through the head.  Coronal and sagittal reformations were also performed. Contrast was not administered.    COMPARISON:  None.    FINDINGS:  There is a 2.2 x 1.1 cm area of high density in the subcutaneous tissues posterior to the trapezius muscle at the level of the internal occipital protuberance.  The remainder of the scalp and calvarium appear unremarkable.    The brain parenchyma appears normal in attenuation with normal gray-white matter differentiation and no mass effect or pathologic fluid collection.   There is no ventriculomegaly.    Mild mucosal thickening in the left maxillary sinus is present.  No air-fluid levels are seen.  The orbits and orbital contents appear unremarkable.                                 Medications   lorazepam (ATIVAN) 2 mg/mL injection (has no administration in time range)   levETIRAcetam injection 1,000 mg (has no administration in time range)   dextrose 50% injection 25 g (25 g Intravenous Given 5/28/22 2122)   metoclopramide HCl injection 10 mg (10 mg Intravenous Given 5/28/22 2156)   dextrose 5 % infusion 1,000 mL ( Intravenous Rate/Dose Change 5/28/22 2224)   dextrose 50% injection (50 mLs  Given 5/28/22 2239)   dextrose 50 % in water (D50W) injection 25 g (25 g Intravenous Given 5/28/22 2230)   lorazepam injection 2 mg (2 mg Intravenous Given 5/28/22 2345)     Medical Decision Making:   History:   Old Medical Records: I decided to obtain old medical records.  Initial Assessment:   32-year-old female presents with a seizure.  Differential Diagnosis:   Initial differential diagnosis included but not limited to intracranial hemorrhage, new onset seizures, and electrolyte abnormality.  Independently Interpreted Test(s):   I have ordered and independently interpreted EKG Reading(s) - see prior notes  Clinical Tests:   Lab Tests: Ordered and Reviewed  Radiological Study: Ordered and Reviewed  Medical Tests: Reviewed and Ordered  ED Management:  The patient was emergently evaluated in the emergency department, her evaluation was significant for a young female who is confused who and altered.  The patient's CT scan of her head showed no acute abnormalities per Radiology.  The patient's labs were significant for severe hypoglycemia.  The patient was aggressively treated with IV dextrose and multiple doses of IV dextrose 50%.  This is likely the etiology of the patient's seizure.  The patient will require admission for further treatment.          Scribe Attestation:   Scribe #1: I performed  the above scribed service and the documentation accurately describes the services I performed. I attest to the accuracy of the note.        ED Course as of 05/28/22 2358   Sat May 28, 2022   2100 The patient's blood glucose was noted to be 48 mg/dL by the lab.  A repeat point of care glucose done by the nursing staff showed it to be 41 mg/dL.  The patient was aggressively treated with a dose of 50% dextrose IV.  The patient did have improvement in her mental status after this. [PE]   2145 The nursing staff did discuss the patient with her family member over the phone, and reports that family member told her that the patient has not been eating and continue to take her insulin at home.  The etiology of her seizure is likely her hypoglycemia.  We will continue to monitor her blood glucose here. [PE]   2320 The patient is tolerating p.o. juice and pudding here in the emergency department.  The patient is awake and alert.  She is following commands and answering questions appropriately. [PE]   2334 The patient had a recurrent generalized tonic clonic seizure.  The patient was treated with 2 mg of Ativan IV.  The patient's blood glucose is noted to be 121 mg/dL.   [PE]   2343 The APC from the hospitalist service is evaluating the patient at bedside.  The patient will be given a Keppra load IV.  The patient is postictal at this time.  The patient has been accepted for admission by the APC from the hospitalist service.   [PE]      ED Course User Index  [PE] Bryan Brooke MD          I, Dr. Bryan Brooke, personally performed the services described in this documentation. All medical record entries made by the scribe were at my direction and in my presence.  I have reviewed the chart and agree that the record reflects my personal performance and is accurate and complete. Bryan Brooke MD.  11:54 PM 05/28/2022      Clinical Impression:   Final diagnoses:  [R56.9] Seizure  [E16.2] Hypoglycemia (Primary)          ED  Disposition Condition    Admit               Bryan Brooke MD  05/28/22 2345       Bryan Brooke MD  05/28/22 4477

## 2022-05-29 NOTE — ASSESSMENT & PLAN NOTE
Acute problem  Seizure precautions  ICU admit  Ativan p.r.n. seizures  Loaded with Keppra in ER  Consult Neurology in a.m.

## 2022-05-29 NOTE — ED TRIAGE NOTES
Tabby Lee is here with after family called EMS after seizure activity today; blood on pillow and mouth trauma.

## 2022-05-29 NOTE — EICU
EICU FOLLOWUP NOTE:    Called for:  Stridor    CAMERA ASSESSMENT: Two way audiovisual assessment was done: patient is obtunded.  Has developed stridor and facial swelling.  Maintaining oxygenation    Telemetry was reviewed. Medical records including notes, labs and imaging were reviewed.    DISCUSSED with bedside nurse     ASSESSMENT AND PLAN:    Grand mal seizures due to hypoglycemia.  Encephalopathy.?  Post ictal.  The patient has developed stridor, facial swelling.  Obtunded, no response to vocal commands  --Emergency room physician called, Case discussed.  He will go to the bedside to assess  --Hydrocortisone, Benadryl and Pepcid ordered    [facility]. Please call as needed    Andrews Edwards MD  [facility]  [phone]    5:17 AM    Call back from ER physician.Assessed the patient at bedside.  No stridor, might have bitten the lower lip, no tongue swelling as per ED physician assessment. On oxygen, saturating 100%.  We will continue to monitor

## 2022-05-29 NOTE — HPI
Tabby Howard 32-year-old female presents emergency room for evaluation of seizure and hypoglycemia.  Patient has a history of diabetes and gastroparesis.  She reports that she has been taking her insulin but not able to hold any food down due to nausea and vomiting.  She was treated several times in the ER for hypoglycemia using dextrose 50%.  Just before my exam patient was reported to have clonic tonic seizure activity which lasted approximately 1 minute; however her glucose at the time was approximately 120. Previous medical history includes anemia, CKD, type 2 diabetes, gallstones, CKD, chronic diastolic heart failure, and gastroparesis.  ER workup:  CBC with thrombocytopenia of 125.  Original glucose on CMP was 48. BUN 46 and creatinine of 3.9 (baseline).  Urinalysis with 5 red blood cells and rare yeast.  CT the head was negative.  Patient was given Ativan status post seizure as well as given a Keppra loading dose.  Patient be admitted to Hospital Medicine for observation management.  Patient be placed in ICU given continue seizure and hypoglycemia.  Will consult Neurology in a.m..

## 2022-05-29 NOTE — CONSULTS
[facility]  Neurology  Consult Note        PATIENT NAME: Tabby Howard  MRN: [id number]  CSN: [id number]      TODAY'S DATE: 05/29/2022  ADMIT DATE: 5/28/2022                            CONSULTING PROVIDER: Neptali Marley MD  CONSULT REQUESTED BY: Raymundo Parker MD      Reason for consult: Seizure       History obtained from chart review and the patient.    HPI per EMR: Tabby Howard is a 32 y.o. female with a history of anemia, CKD, type 2 diabetes, gallstones, CKD, chronic diastolic heart failure, and gastroparesis. presents emergency room for evaluation of seizure and hypoglycemia.  Patient has a history of diabetes and gastroparesis.  She reports that she has been taking her insulin but not able to hold any food down due to nausea and vomiting.  She was treated several times in the ER for hypoglycemia using dextrose 50%.  In the ER, patient was reported to have clonic tonic seizure activity which lasted approximately 1 minute.  Patient was given Ativan status post seizure as well as given a Keppra loading dose.    Neurology consult:  Patient was seen examined by me this morning.  She is lethargic, opens eyes to stimulus is able to tell me her name however she does not follow commands or answer any other questions.  She is able to move all extremities symmetrically.    Patient presented to the hospital with severe nausea and vomiting.  In the ER, she was reported to have a generalized tonic-clonic seizure lasting for about a minute.  She did bite her tongue during the episode.  Prior to the episode, her blood sugar was 48. She received a loading dose of Keppra.  She was admitted to the ICU for further care.    PREVIOUS MEDICAL HISTORY:  Past Medical History:   Diagnosis Date    CKD (chronic kidney disease), stage IV 4/12/2022    Diabetes mellitus due to underlying condition with unspecified complications 4/12/2022    Gastroparesis 4/12/2022    Heart failure with preserved ejection  fraction 2022    EF 55% on 3/22    History of gastroesophageal reflux (GERD)     History of supraventricular tachycardia     Sickle cell trait 2022    Type 2 diabetes mellitus      PREVIOUS SURGICAL HISTORY:  Past Surgical History:   Procedure Laterality Date     SECTION      x 3    ESOPHAGOGASTRODUODENOSCOPY N/A 10/18/2019    Procedure: ESOPHAGOGASTRODUODENOSCOPY (EGD);  Surgeon: Gianluca Mendez MD;  Location: [facility];  Service: Endoscopy;  Laterality: N/A;     FAMILY MEDICAL HISTORY:  Family History   Problem Relation Age of Onset    Diabetes Mother     Diabetes Father      SOCIAL HISTORY:  Social History     Tobacco Use    Smoking status: Never Smoker    Smokeless tobacco: Never Used   Substance Use Topics    Alcohol use: No    Drug use: No     ALLERGIES:  Review of patient's allergies indicates:   Allergen Reactions    Penicillins Hives     HOME MEDICATIONS:  Prior to Admission medications    Medication Sig Start Date End Date Taking? Authorizing Provider   acetaminophen (TYLENOL) 325 MG tablet Take 2 tablets (650 mg total) by mouth every 6 (six) hours as needed for Pain or Temperature greater than (100.3). 21   Varun Cleveland MD   blood-glucose meter kit Use as instructed 20  Marcia Santa MD   cephALEXin (KEFLEX) 500 MG capsule Take 500 mg by mouth 4 (four) times daily. 3/28/21   Historical Provider   doxycycline (VIBRAMYCIN) 100 MG Cap Take 100 mg by mouth 2 (two) times daily. 3/22/21   Historical Provider   ibuprofen (ADVIL,MOTRIN) 600 MG tablet Take 1 tablet (600 mg total) by mouth every 6 (six) hours as needed for Pain. 21   Varun Cleveland MD   insulin (BASAGLAR KWIKPEN U-100 INSULIN) glargine 100 units/mL (3mL) SubQ pen Inject 28 Units into the skin every evening. 21  Stefania Crowe MD   insulin glulisine U-100 (APIDRA U-100 INSULIN) 100 unit/mL injection Inject 8 Units into the skin 3 (three) times daily before meals.  4/26/21 4/26/22  Stefania Crowe MD   metoclopramide HCl (REGLAN) 10 MG tablet Take 1 tablet (10 mg total) by mouth every 6 (six) hours as needed (headache, nausea or vomiting). 5/6/22   Dustin Donato MD   ondansetron (ZOFRAN) 4 MG tablet Take 1 tablet (4 mg total) by mouth every 6 (six) hours as needed for Nausea. 4/26/21   Stefania Crowe MD   ondansetron (ZOFRAN-ODT) 4 MG TbDL Take 1 tablet (4 mg total) by mouth every 6 (six) hours as needed (nausea or vomiting). 4/12/22   Varun Cleveland MD   oxyCODONE (ROXICODONE) 5 MG immediate release tablet Take 1 tablet (5 mg total) by mouth every 6 (six) hours as needed for Pain. 4/26/21   Stefania Crowe MD   pantoprazole (PROTONIX) 40 MG tablet Take 1 tablet (40 mg total) by mouth once daily. 4/26/21 8/24/21  Stefania Crowe MD   polyethylene glycol (GLYCOLAX) 17 gram PwPk Take 17 g by mouth once daily. 4/26/21   Stefania Crowe MD   promethazine (PHENERGAN) 25 MG suppository Place 1 suppository (25 mg total) rectally every 6 (six) hours as needed for Nausea (For severe nausea and vomiting not improved with oral medications). 4/12/22   Varun Cleveland MD   senna-docusate 8.6-50 mg (SENNA WITH DOCUSATE SODIUM) 8.6-50 mg per tablet Take 1 tablet by mouth daily as needed for Constipation. 4/26/21   Stefania Crowe MD     CURRENT SCHEDULED MEDICATIONS:   dexmedeTOMIDine in dextrose 5%        etomidate        lorazepam        mupirocin   Nasal BID    pantoprazole  40 mg Intravenous Daily    propofoL        rocuronium        succinylcholine         CURRENT INFUSIONS:   dexmedetomidine (PRECEDEX) infusion Stopped (05/29/22 0841)    dextrose 5 % 100 mL/hr at 05/29/22 0824    niCARdipine Stopped (05/29/22 0930)     CURRENT PRN MEDICATIONS:  acetaminophen, albuterol-ipratropium, aluminum-magnesium hydroxide-simethicone, dextrose 10%, dextrose 10%, diphenhydrAMINE, EPINEPHrine, famotidine (PF), glucagon (human recombinant), glucose, glucose, hydrALAZINE,  "hydrocortisone sodium succinate, insulin aspart U-100, lorazepam, magnesium oxide, magnesium oxide, melatonin, naloxone, ondansetron, potassium bicarbonate, potassium bicarbonate, potassium bicarbonate, potassium, sodium phosphates, potassium, sodium phosphates, potassium, sodium phosphates, simethicone, sodium chloride 0.9%    REVIEW OF SYSTEMS:  A review of systems cannot be obtained as the patient is unable to respond to questions appropriately.       PHYSICAL EXAM:  Patient Vitals for the past 24 hrs:   BP Temp Temp src Pulse Resp SpO2 Height Weight   05/29/22 1000 -- -- -- 87 13 100 % -- --   05/29/22 0945 125/85 -- -- 87 13 100 % -- --   05/29/22 0930 113/68 -- -- 74 16 100 % -- --   05/29/22 0915 124/70 -- -- 75 17 100 % -- --   05/29/22 0900 135/84 -- -- 80 16 100 % -- --   05/29/22 0845 -- -- -- 76 19 100 % -- --   05/29/22 0830 (!) 192/111 -- -- 77 17 98 % -- --   05/29/22 0815 (!) 204/119 -- -- 79 18 98 % -- --   05/29/22 0800 (!) 200/117 -- -- 82 18 95 % -- --   05/29/22 0745 -- -- -- 78 15 99 % -- --   05/29/22 0740 -- -- -- 76 14 100 % -- --   05/29/22 0730 (!) 195/104 97 °F (36.1 °C) Axillary 77 17 100 % -- --   05/29/22 0715 -- -- -- 76 19 100 % -- --   05/29/22 0630 (!) 190/110 -- -- 83 14 100 % -- --   05/29/22 0600 (!) 179/99 -- -- 74 18 100 % -- --   05/29/22 0530 (!) 185/102 -- -- 74 18 100 % -- --   05/29/22 0500 (!) 185/104 -- -- 75 18 100 % -- --   05/29/22 0430 (!) 172/98 -- -- 72 (!) 21 99 % -- --   05/29/22 0400 (!) 177/101 -- -- 76 20 95 % -- --   05/29/22 0330 (!) 194/110 -- -- 83 17 99 % -- --   05/29/22 0315 (!) 187/108 -- -- 78 20 95 % -- --   05/29/22 0300 (!) 190/109 -- -- 82 18 97 % -- --   05/29/22 0247 (!) 190/115 -- -- -- -- -- -- --   05/29/22 0245 (!) 190/115 -- -- 84 16 96 % -- --   05/29/22 0230 (!) 179/123 -- -- 88 20 99 % -- --   05/29/22 0200 (!) 172/116 (!) 94.8 °F (34.9 °C) Rectal 89 20 99 % 5' 2" (1.575 m) 70.8 kg (156 lb 1.4 oz)   05/29/22 0145 (!) 186/123 -- -- 90 " (!) 25 99 % -- --   05/29/22 0103 (!) 173/115 -- -- 87 -- 98 % -- --   05/29/22 0032 (!) 165/103 -- -- 82 -- (!) 90 % -- --   05/29/22 0002 (!) 163/98 -- -- 94 -- 97 % -- --   05/28/22 2336 (!) 171/118 -- -- 100 -- 100 % -- --   05/28/22 2232 (!) 188/120 -- -- 90 19 (!) 94 % -- --   05/28/22 2213 (!) 198/123 -- -- 91 -- 98 % -- --   05/28/22 2111 (!) 163/102 -- -- 94 18 99 % -- --   05/28/22 2100 -- 97.5 °F (36.4 °C) Oral -- -- -- -- --   05/28/22 2030 (!) 182/121 -- -- 99 20 98 % -- --       GENERAL APPEARANCE: Lethargic, well-developed, well-nourished female in mild distress.  HEENT: Normocephalic and atraumatic(except swollen lip ?  Due to bite). PERRL. Oropharynx unremarkable.  PULM: Normal respiratory effort. No accessory muscle use.  CV: RRR.  ABDOMEN: Soft, nontender.  EXTREMITIES: No obvious signs of vascular compromise. Pulses present. No cyanosis, clubbing or edema.  SKIN:  Tongue/lip bite    NEURO:  MENTAL STATUS:  Patient is lethargic, opens eyes to repeated stimulus and is able to tell me her name.  She does not have that any other questions or follow commands.  CRANIAL NERVES:  Pupils equal round and reactive to light, face is grossly symmetric.  Rest of cranial nerves cannot be assessed due to her mental status.    MOTOR:  Bulk normal. Tone normal and symmetric throughout.  Abnormal movements absent.  Tremor: none present.  Strength Moves all extremities symmetrically.  Cannot assess exact strength due to her mental status..    REFLEXES:  DTRs 2+ throughout.  Plantar response downgoing bilaterally.  SENSATION:Moves all extremities symmetrically to stimulus.  COORDINATION: Could not be tested.  STATION: not tested.  GAIT: not tested.    Labs:  Recent Labs   Lab 05/28/22 2038 05/29/22  0241    141   K 3.5 3.7    105   CO2 25 24   BUN 46* 46*   CREATININE 3.9* 3.7*   GLU 48* 108   CALCIUM 8.9 8.6*   PHOS  --  3.9   MG  --  2.0     Recent Labs   Lab 05/28/22 2038 05/29/22  0241   WBC 6.88  10.46   HGB 12.2 11.9*   HCT 33.9* 34.4*   * 107*     Recent Labs   Lab 05/28/22 2038 05/29/22  0241   ALBUMIN 2.6* 2.4*   PROT 5.8* 5.6*   BILITOT 0.9 0.8   ALKPHOS 77 73   ALT 23 25   AST 27 34     Lab Results   Component Value Date    INR 1.0 03/03/2020     No results found for: CHOLTOT, TRIG, HDL, CHOLHDL, LDL  Lab Results   Component Value Date    HGBA1C 5.8 (H) 05/15/2022     No results found for: PROTEINCSF, GLUCCSF, WBCCSF    Imaging:  I have reviewed and interpreted the pertinent imaging and lab results.      CT Head Without Contrast  Narrative: EXAMINATION:  CT HEAD WITHOUT CONTRAST    CLINICAL HISTORY:  Seizure, new-onset, no history of trauma;    TECHNIQUE:  Low dose axial images were obtained through the head.  Coronal and sagittal reformations were also performed. Contrast was not administered.    COMPARISON:  None.    FINDINGS:  There is a 2.2 x 1.1 cm area of high density in the subcutaneous tissues posterior to the trapezius muscle at the level of the internal occipital protuberance.  The remainder of the scalp and calvarium appear unremarkable.    The brain parenchyma appears normal in attenuation with normal gray-white matter differentiation and no mass effect or pathologic fluid collection.  There is no ventriculomegaly.    Mild mucosal thickening in the left maxillary sinus is present.  No air-fluid levels are seen.  The orbits and orbital contents appear unremarkable.  Impression: Negative CT of the brain for hemorrhage mass or infarction.    Small area of high density posterior to the trapezius muscle.  Correlate for posterior neck skull junction soft tissue hematoma.    Electronically signed by: Gianluca Juarez  Date:    05/28/2022  Time:    21:10         ASSESSMENT & PLAN:    Seizure  Hypoglycemia    Workup:   CT head:  No acute intracranial abnormality  MRI brain:  Pending  EEG:  Severe encephalopathy without epileptiform activity or seizures    Plan:  · Etiology of seizures likely  hypoglycemia.  · Due to seizure being proved the setting of hyperglycemia, will hold off on starting maintenance antiseizure medications at this time.  · Management of hypoglycemia per primary team.  · Seizure precautions.  · Neuro checks per unit protocol.  · Physical and Occupational therapy evaluate and treat.  · Avoid medications that lower seizure threshold.  · Will continue to follow    Seizure education.      No driving for now, no swimming alone, no climbing high areas, no operation of heavy machinery or working with high risk electricity equipment.      Patient and/or next of kin informed.  Follow up Neurology in 2 weeks. Call [phone] to make an appointment.      Thank you kindly for including us in the care of this patient. Please do not hesitate to contact us with any questions.      Critical Care:  41 minutes of critical care time has been spent evaluating with the patient. Time includes chart review not limited to diagnostic imaging, labs, and vitals, patient assessment, discussion with family and nursing, current order evaluations, and new order entries.       Neptali Marley MD  Neurology/vascular Neurology  Date of Service: 05/29/2022  10:13 AM        Please note: This note was transcribed using voice recognition software. Because of this technology there are often uinintended grammatical, spelling, and other transcription errors. Please disregard these errors.

## 2022-05-29 NOTE — PT/OT/SLP PROGRESS
Physical Therapy      Patient Name:  Tabby Howard   MRN:  [id number]    Patient not seen today secondary to unable to arouse. Will follow-up on 05/30/22.

## 2022-05-29 NOTE — CARE UPDATE
05/29/22 0740   Patient Assessment/Suction   Level of Consciousness (AVPU) responds to voice   Respiratory Effort Normal   Expansion/Accessory Muscles/Retractions expansion symmetric   BRYAN Breath Sounds clear   LLL Breath Sounds diminished   RUL Breath Sounds clear   RML Breath Sounds diminished   RLL Breath Sounds diminished   Rhythm/Pattern, Respiratory pattern regular;shallow   Cough Frequency no cough   PRE-TX-O2   O2 Device (Oxygen Therapy) nasal cannula   $ Is the patient on Low Flow Oxygen? Yes   Flow (L/min) 2   SpO2 100 %   Pulse Oximetry Type Continuous   $ Pulse Oximetry - Multiple Charge Pulse Oximetry - Multiple   Pulse 76   Resp 14   Positioning HOB elevated 30 degrees   ETCO2   ETCO2 (mmHg) 0 mmHg

## 2022-05-29 NOTE — NURSING
1545: Pt down to MRI. IVF and Cardene gtt infusing.   1600: 25 mg Benadryl and 2 mg Ativan PRN given for agitation- no relief obtained.   1615: Called Dr. Parker, pt becoming increasingly agitated and will not stay still for scan. Orders for an additional 2 mg IV Ativan.   1620: Ativan given, pt now relaxed. VSS. Will cont to monitor.   1645: Back in ICU, VSS. Connected to ICU monitors.

## 2022-05-29 NOTE — H&P
[facility] Medicine  History & Physical    Patient Name: Tabby Howard  MRN: [id number]  Patient Class: IP- Inpatient  Admission Date: 5/28/2022  Attending Physician: Raymundo Parker MD   Primary Care Provider: Primary Doctor No         Patient information was obtained from patient, past medical records and ER records.     Subjective:     Principal Problem:Seizure    Chief Complaint:   Chief Complaint   Patient presents with    Seizures     Found in bed with blood on pillow and mouth trauma        HPI: Tabby Howard 32-year-old female presents emergency room for evaluation of seizure and hypoglycemia.  Patient has a history of diabetes and gastroparesis.  She reports that she has been taking her insulin but not able to hold any food down due to nausea and vomiting.  She was treated several times in the ER for hypoglycemia using dextrose 50%.  Just before my exam patient was reported to have clonic tonic seizure activity which lasted approximately 1 minute; however her glucose at the time was approximately 120. Previous medical history includes anemia, CKD, type 2 diabetes, gallstones, CKD, chronic diastolic heart failure, and gastroparesis.  ER workup:  CBC with thrombocytopenia of 125.  Original glucose on CMP was 48. BUN 46 and creatinine of 3.9 (baseline).  Urinalysis with 5 red blood cells and rare yeast.  CT the head was negative.  Patient was given Ativan status post seizure as well as given a Keppra loading dose.  Patient be admitted to Hospital Medicine for observation management.  Patient be placed in ICU given continue seizure and hypoglycemia.  Will consult Neurology in a.m..      Past Medical History:   Diagnosis Date    CKD (chronic kidney disease), stage IV 4/12/2022    Diabetes mellitus due to underlying condition with unspecified complications 4/12/2022    Gastroparesis 4/12/2022    Heart failure with preserved ejection fraction 4/12/2022    EF 55% on 3/22    History of  gastroesophageal reflux (GERD)     History of supraventricular tachycardia     Sickle cell trait 2022    Type 2 diabetes mellitus        Past Surgical History:   Procedure Laterality Date     SECTION      x 3    ESOPHAGOGASTRODUODENOSCOPY N/A 10/18/2019    Procedure: ESOPHAGOGASTRODUODENOSCOPY (EGD);  Surgeon: Gianluca Mendez MD;  Location: [facility];  Service: Endoscopy;  Laterality: N/A;       Review of patient's allergies indicates:   Allergen Reactions    Penicillins Hives       No current facility-administered medications on file prior to encounter.     Current Outpatient Medications on File Prior to Encounter   Medication Sig    acetaminophen (TYLENOL) 325 MG tablet Take 2 tablets (650 mg total) by mouth every 6 (six) hours as needed for Pain or Temperature greater than (100.3).    blood-glucose meter kit Use as instructed    cephALEXin (KEFLEX) 500 MG capsule Take 500 mg by mouth 4 (four) times daily.    doxycycline (VIBRAMYCIN) 100 MG Cap Take 100 mg by mouth 2 (two) times daily.    ibuprofen (ADVIL,MOTRIN) 600 MG tablet Take 1 tablet (600 mg total) by mouth every 6 (six) hours as needed for Pain.    insulin (BASAGLAR KWIKPEN U-100 INSULIN) glargine 100 units/mL (3mL) SubQ pen Inject 28 Units into the skin every evening.    insulin glulisine U-100 (APIDRA U-100 INSULIN) 100 unit/mL injection Inject 8 Units into the skin 3 (three) times daily before meals.    metoclopramide HCl (REGLAN) 10 MG tablet Take 1 tablet (10 mg total) by mouth every 6 (six) hours as needed (headache, nausea or vomiting).    ondansetron (ZOFRAN) 4 MG tablet Take 1 tablet (4 mg total) by mouth every 6 (six) hours as needed for Nausea.    ondansetron (ZOFRAN-ODT) 4 MG TbDL Take 1 tablet (4 mg total) by mouth every 6 (six) hours as needed (nausea or vomiting).    oxyCODONE (ROXICODONE) 5 MG immediate release tablet Take 1 tablet (5 mg total) by mouth every 6 (six) hours as needed for Pain.    pantoprazole  (PROTONIX) 40 MG tablet Take 1 tablet (40 mg total) by mouth once daily.    polyethylene glycol (GLYCOLAX) 17 gram PwPk Take 17 g by mouth once daily.    promethazine (PHENERGAN) 25 MG suppository Place 1 suppository (25 mg total) rectally every 6 (six) hours as needed for Nausea (For severe nausea and vomiting not improved with oral medications).    senna-docusate 8.6-50 mg (SENNA WITH DOCUSATE SODIUM) 8.6-50 mg per tablet Take 1 tablet by mouth daily as needed for Constipation.     Family History       Problem Relation (Age of Onset)    Diabetes Mother, Father          Tobacco Use    Smoking status: Never Smoker    Smokeless tobacco: Never Used   Substance and Sexual Activity    Alcohol use: No    Drug use: No    Sexual activity: Not Currently     Partners: Male     Birth control/protection: I.U.D.     Review of Systems   Constitutional:  Positive for activity change and appetite change. Negative for chills, diaphoresis and fever.   HENT:  Negative for congestion, nosebleeds and tinnitus.    Eyes:  Negative for photophobia and visual disturbance.   Respiratory:  Negative for cough, chest tightness, shortness of breath and wheezing.    Cardiovascular:  Negative for chest pain, palpitations and leg swelling.   Gastrointestinal:  Positive for nausea and vomiting. Negative for abdominal distention, abdominal pain, constipation and diarrhea.   Endocrine: Negative for cold intolerance and heat intolerance.   Genitourinary:  Negative for difficulty urinating, dysuria, frequency, hematuria and urgency.   Musculoskeletal:  Negative for arthralgias, back pain and myalgias.   Skin:  Negative for pallor, rash and wound.   Allergic/Immunologic: Negative for immunocompromised state.   Neurological:  Positive for seizures. Negative for dizziness, tremors, facial asymmetry, speech difficulty and weakness.   Hematological:  Negative for adenopathy. Does not bruise/bleed easily.   Psychiatric/Behavioral:  Negative for  confusion and sleep disturbance. The patient is not nervous/anxious.    Objective:     Vital Signs (Most Recent):  Temp: 97.5 °F (36.4 °C) (05/28/22 2100)  Pulse: 95 (05/29/22 0002)  BP: (!) 163/98 (05/29/22 0002)  SpO2: 97 % (05/29/22 0002)   Vital Signs (24h Range):  Temp:  [97.5 °F (36.4 °C)] 97.5 °F (36.4 °C)  Pulse:  [95-99] 95  SpO2:  [97 %-98 %] 97 %  BP: (163-182)/() 163/98        There is no height or weight on file to calculate BMI.    Physical Exam  Vitals and nursing note reviewed.   Constitutional:       General: She is not in acute distress.     Appearance: She is well-developed. She is ill-appearing. She is not diaphoretic.   HENT:      Head: Normocephalic.      Mouth/Throat:      Mouth: Mucous membranes are dry.   Eyes:      General: No scleral icterus.     Conjunctiva/sclera: Conjunctivae normal.      Pupils: Pupils are equal, round, and reactive to light.   Neck:      Vascular: No JVD.   Cardiovascular:      Rate and Rhythm: Normal rate and regular rhythm.      Heart sounds: Normal heart sounds. No murmur heard.    No friction rub. No gallop.   Pulmonary:      Effort: Pulmonary effort is normal. No respiratory distress.      Breath sounds: Normal breath sounds. No wheezing or rales.   Abdominal:      General: Bowel sounds are normal. There is no distension.      Palpations: Abdomen is soft.      Tenderness: There is no abdominal tenderness. There is no guarding or rebound.   Musculoskeletal:         General: No tenderness. Normal range of motion.      Cervical back: Normal range of motion and neck supple.   Lymphadenopathy:      Cervical: No cervical adenopathy.   Skin:     General: Skin is warm and dry.      Capillary Refill: Capillary refill takes less than 2 seconds.      Coloration: Skin is not pale.      Findings: No erythema or rash.   Neurological:      Mental Status: She is disoriented.      Cranial Nerves: No cranial nerve deficit.      Sensory: No sensory deficit.      Coordination:  Coordination normal.      Deep Tendon Reflexes: Reflexes normal.      Comments: Postictal status post seizure         CRANIAL NERVES     CN III, IV, VI   Pupils are equal, round, and reactive to light.     Significant Labs: All pertinent labs within the past 24 hours have been reviewed.  CBC:   Recent Labs   Lab 05/28/22 2038   WBC 6.88   HGB 12.2   HCT 33.9*   *     CMP:   Recent Labs   Lab 05/28/22 2038      K 3.5      CO2 25   GLU 48*   BUN 46*   CREATININE 3.9*   CALCIUM 8.9   PROT 5.8*   ALBUMIN 2.6*   BILITOT 0.9   ALKPHOS 77   AST 27   ALT 23   ANIONGAP 13   EGFRNONAA 14*     Urine Studies:   Recent Labs   Lab 05/28/22 2036   COLORU Yellow   APPEARANCEUA Clear   PHUR 7.0   SPECGRAV 1.020   PROTEINUA 3+*   GLUCUA 2+*   KETONESU Negative   BILIRUBINUA Negative   OCCULTUA 2+*   NITRITE Negative   UROBILINOGEN Negative   LEUKOCYTESUR Negative   RBCUA 5*   WBCUA 1   BACTERIA None   HYALINECASTS 0       Significant Imaging: I have reviewed all pertinent imaging results/findings within the past 24 hours.    CT head:  Impression:     Negative CT of the brain for hemorrhage mass or infarction.     Small area of high density posterior to the trapezius muscle.  Correlate for posterior neck skull junction soft tissue hematoma      Assessment/Plan:     * Seizure  Acute problem  Seizure precautions  ICU admit  Ativan p.r.n. seizures  Loaded with Keppra in ER  Consult Neurology in a.m.        Heart failure with preserved ejection fraction  Chronic  Patient is identified as having Diastolic (HFpEF) heart failure that is Chronic. CHF is currently controlled. Latest ECHO performed and demonstrates- No results found for this or any previous visit.  . Continue Furosemide and monitor clinical status closely. Monitor on telemetry. Patient is off CHF pathway.  Monitor strict Is&Os and daily weights.  Place on fluid restriction of 1.5 L. Continue to stress to patient importance of self efficacy and  on  diet for CHF. Last BNP reviewed- and noted below No results for input(s): BNP, BNPTRIAGEBLO in the last 168 hours..          Gastroparesis  Chronic  NPO  IV fluids  Antiemetics  Recheck electrolytes      CKD (chronic kidney disease), stage IV  Chronic  Creatine stable for now. BMP reviewed- noted CrCl cannot be calculated (Unknown ideal weight.). according to latest data. Monitor UOP and serial BMP and adjust therapy as needed. Renally dose meds.          Type II diabetes mellitus  Chronic  Patient's FSGs are controlled on current medication regimen.  Last A1c reviewed-   Lab Results   Component Value Date    HGBA1C 5.8 (H) 05/15/2022     Most recent fingerstick glucose reviewed-   Recent Labs   Lab 05/28/22  2153 05/28/22  2217 05/28/22  2254 05/28/22  2332   POCTGLUCOSE 72 56* 78 121*     Current correctional scale  Medium  Maintain anti-hyperglycemic dose as follows-   Antihyperglycemics (From admission, onward)            None        Hold Oral hypoglycemics while patient is in the hospital.        VTE Risk Mitigation (From admission, onward)         Ordered     IP VTE LOW RISK PATIENT  Once         05/29/22 0004     Place sequential compression device  Until discontinued         05/29/22 0004     Place JOCELYNE hose  Until discontinued         05/29/22 0004            Critical care time spent on the evaluation and treatment of severe organ dysfunction, review of pertinent labs and imaging studies, discussions with consulting providers and discussions with patient/family 45 minutes         Varun Dillard NP  Department of Hospital Medicine   [facility] - Emergency Dept

## 2022-05-29 NOTE — PROCEDURES
Date of service  05/29/2022    Introduction  Electroencephalographic (EEG) recording is performed with electrodes placed according to the International 10-20 placement system. Thirty two (32) channels of digital signal (sampling rate of 512/sec) including T1 and T2 was simultaneously recorded from the scalp and may include EKG, EMG, and/or eye monitors. Recording band pass was 0.1 to 512 Hz. Digital video recording of the patient is simultaneously recorded with the EEG. The patient is instructed to report clinical symptoms which may occur during the recording session. EEG and video recording is stored and archived in digital format. Activation procedures which include photic stimulation, hyperventilation and instructing patients to perform simple tasks are done in selected patients.    The EEG is displayed on a monitor screen and can be reviewed using different montages. Computer assisted analysis is employed to detect spike and electrographic seizure activity. The entire record is submitted for computer analysis. The entire recording is visually reviewed and, the times identified by computer analysis as being spikes or seizures are reviewed again.     Compressed spectral analysis (CSA) is also performed on the activity recorded from each individual channel. This is displayed as a power display of frequencies from 0 to 30 Hz over time. The CSA is reviewed looking for asymmetries in power between homologous areas of the scalp and then compared with the original EEG recording.     Findings  The short-term video EEG recording during wakefulness contains diffuse delta slowing with occasional theta up to 6-7 Hz and occasional intermixed beta activity.       No abnormal activity occurred during photic stimulation. Hyperventilation was not performed.     During the recording, the patient became drowsy but no sleep architecture was seen.     The EKG channel showed regular rhythm.    Interpretation  The short-term video EEG  shows a severe degree of diffuse slowing of the background, indicative of an encephlopathy but nonspecific for etiology. Post-ictal state can also appear similar on EEG. The beta activity can be seen as a result of medication effect. No potentially epileptiform activity nor seizures were seen.

## 2022-05-29 NOTE — ASSESSMENT & PLAN NOTE
Chronic  Patient's FSGs are controlled on current medication regimen.  Last A1c reviewed-   Lab Results   Component Value Date    HGBA1C 5.8 (H) 05/15/2022     Most recent fingerstick glucose reviewed-   Recent Labs   Lab 05/28/22 2153 05/28/22 2217 05/28/22 2254 05/28/22  2332   POCTGLUCOSE 72 56* 78 121*     Current correctional scale  Medium  Maintain anti-hyperglycemic dose as follows-   Antihyperglycemics (From admission, onward)            None        Hold Oral hypoglycemics while patient is in the hospital.

## 2022-05-29 NOTE — SUBJECTIVE & OBJECTIVE
Past Medical History:   Diagnosis Date    CKD (chronic kidney disease), stage IV 2022    Diabetes mellitus due to underlying condition with unspecified complications 2022    Gastroparesis 2022    Heart failure with preserved ejection fraction 2022    EF 55% on 3/22    History of gastroesophageal reflux (GERD)     History of supraventricular tachycardia     Sickle cell trait 2022    Type 2 diabetes mellitus        Past Surgical History:   Procedure Laterality Date     SECTION      x 3    ESOPHAGOGASTRODUODENOSCOPY N/A 10/18/2019    Procedure: ESOPHAGOGASTRODUODENOSCOPY (EGD);  Surgeon: Gianluca Mendez MD;  Location: [facility];  Service: Endoscopy;  Laterality: N/A;       Review of patient's allergies indicates:   Allergen Reactions    Penicillins Hives       No current facility-administered medications on file prior to encounter.     Current Outpatient Medications on File Prior to Encounter   Medication Sig    acetaminophen (TYLENOL) 325 MG tablet Take 2 tablets (650 mg total) by mouth every 6 (six) hours as needed for Pain or Temperature greater than (100.3).    blood-glucose meter kit Use as instructed    cephALEXin (KEFLEX) 500 MG capsule Take 500 mg by mouth 4 (four) times daily.    doxycycline (VIBRAMYCIN) 100 MG Cap Take 100 mg by mouth 2 (two) times daily.    ibuprofen (ADVIL,MOTRIN) 600 MG tablet Take 1 tablet (600 mg total) by mouth every 6 (six) hours as needed for Pain.    insulin (BASAGLAR KWIKPEN U-100 INSULIN) glargine 100 units/mL (3mL) SubQ pen Inject 28 Units into the skin every evening.    insulin glulisine U-100 (APIDRA U-100 INSULIN) 100 unit/mL injection Inject 8 Units into the skin 3 (three) times daily before meals.    metoclopramide HCl (REGLAN) 10 MG tablet Take 1 tablet (10 mg total) by mouth every 6 (six) hours as needed (headache, nausea or vomiting).    ondansetron (ZOFRAN) 4 MG tablet Take 1 tablet (4 mg total) by mouth every 6 (six) hours as needed for  Nausea.    ondansetron (ZOFRAN-ODT) 4 MG TbDL Take 1 tablet (4 mg total) by mouth every 6 (six) hours as needed (nausea or vomiting).    oxyCODONE (ROXICODONE) 5 MG immediate release tablet Take 1 tablet (5 mg total) by mouth every 6 (six) hours as needed for Pain.    pantoprazole (PROTONIX) 40 MG tablet Take 1 tablet (40 mg total) by mouth once daily.    polyethylene glycol (GLYCOLAX) 17 gram PwPk Take 17 g by mouth once daily.    promethazine (PHENERGAN) 25 MG suppository Place 1 suppository (25 mg total) rectally every 6 (six) hours as needed for Nausea (For severe nausea and vomiting not improved with oral medications).    senna-docusate 8.6-50 mg (SENNA WITH DOCUSATE SODIUM) 8.6-50 mg per tablet Take 1 tablet by mouth daily as needed for Constipation.     Family History       Problem Relation (Age of Onset)    Diabetes Mother, Father          Tobacco Use    Smoking status: Never Smoker    Smokeless tobacco: Never Used   Substance and Sexual Activity    Alcohol use: No    Drug use: No    Sexual activity: Not Currently     Partners: Male     Birth control/protection: I.U.D.     Review of Systems   Constitutional:  Positive for activity change and appetite change. Negative for chills, diaphoresis and fever.   HENT:  Negative for congestion, nosebleeds and tinnitus.    Eyes:  Negative for photophobia and visual disturbance.   Respiratory:  Negative for cough, chest tightness, shortness of breath and wheezing.    Cardiovascular:  Negative for chest pain, palpitations and leg swelling.   Gastrointestinal:  Positive for nausea and vomiting. Negative for abdominal distention, abdominal pain, constipation and diarrhea.   Endocrine: Negative for cold intolerance and heat intolerance.   Genitourinary:  Negative for difficulty urinating, dysuria, frequency, hematuria and urgency.   Musculoskeletal:  Negative for arthralgias, back pain and myalgias.   Skin:  Negative for pallor, rash and wound.   Allergic/Immunologic:  Negative for immunocompromised state.   Neurological:  Positive for seizures. Negative for dizziness, tremors, facial asymmetry, speech difficulty and weakness.   Hematological:  Negative for adenopathy. Does not bruise/bleed easily.   Psychiatric/Behavioral:  Negative for confusion and sleep disturbance. The patient is not nervous/anxious.    Objective:     Vital Signs (Most Recent):  Temp: 97.5 °F (36.4 °C) (05/28/22 2100)  Pulse: 95 (05/29/22 0002)  BP: (!) 163/98 (05/29/22 0002)  SpO2: 97 % (05/29/22 0002)   Vital Signs (24h Range):  Temp:  [97.5 °F (36.4 °C)] 97.5 °F (36.4 °C)  Pulse:  [95-99] 95  SpO2:  [97 %-98 %] 97 %  BP: (163-182)/() 163/98        There is no height or weight on file to calculate BMI.    Physical Exam  Vitals and nursing note reviewed.   Constitutional:       General: She is not in acute distress.     Appearance: She is well-developed. She is ill-appearing. She is not diaphoretic.   HENT:      Head: Normocephalic.      Mouth/Throat:      Mouth: Mucous membranes are dry.   Eyes:      General: No scleral icterus.     Conjunctiva/sclera: Conjunctivae normal.      Pupils: Pupils are equal, round, and reactive to light.   Neck:      Vascular: No JVD.   Cardiovascular:      Rate and Rhythm: Normal rate and regular rhythm.      Heart sounds: Normal heart sounds. No murmur heard.    No friction rub. No gallop.   Pulmonary:      Effort: Pulmonary effort is normal. No respiratory distress.      Breath sounds: Normal breath sounds. No wheezing or rales.   Abdominal:      General: Bowel sounds are normal. There is no distension.      Palpations: Abdomen is soft.      Tenderness: There is no abdominal tenderness. There is no guarding or rebound.   Musculoskeletal:         General: No tenderness. Normal range of motion.      Cervical back: Normal range of motion and neck supple.   Lymphadenopathy:      Cervical: No cervical adenopathy.   Skin:     General: Skin is warm and dry.      Capillary  Refill: Capillary refill takes less than 2 seconds.      Coloration: Skin is not pale.      Findings: No erythema or rash.   Neurological:      Mental Status: She is disoriented.      Cranial Nerves: No cranial nerve deficit.      Sensory: No sensory deficit.      Coordination: Coordination normal.      Deep Tendon Reflexes: Reflexes normal.      Comments: Postictal status post seizure         CRANIAL NERVES     CN III, IV, VI   Pupils are equal, round, and reactive to light.     Significant Labs: All pertinent labs within the past 24 hours have been reviewed.  CBC:   Recent Labs   Lab 05/28/22 2038   WBC 6.88   HGB 12.2   HCT 33.9*   *     CMP:   Recent Labs   Lab 05/28/22 2038      K 3.5      CO2 25   GLU 48*   BUN 46*   CREATININE 3.9*   CALCIUM 8.9   PROT 5.8*   ALBUMIN 2.6*   BILITOT 0.9   ALKPHOS 77   AST 27   ALT 23   ANIONGAP 13   EGFRNONAA 14*     Urine Studies:   Recent Labs   Lab 05/28/22 2036   COLORU Yellow   APPEARANCEUA Clear   PHUR 7.0   SPECGRAV 1.020   PROTEINUA 3+*   GLUCUA 2+*   KETONESU Negative   BILIRUBINUA Negative   OCCULTUA 2+*   NITRITE Negative   UROBILINOGEN Negative   LEUKOCYTESUR Negative   RBCUA 5*   WBCUA 1   BACTERIA None   HYALINECASTS 0       Significant Imaging: I have reviewed all pertinent imaging results/findings within the past 24 hours.    CT head:  Impression:     Negative CT of the brain for hemorrhage mass or infarction.     Small area of high density posterior to the trapezius muscle.  Correlate for posterior neck skull junction soft tissue hematoma

## 2022-05-30 PROBLEM — R80.9 NEPHROTIC RANGE PROTEINURIA: Status: ACTIVE | Noted: 2022-01-08

## 2022-05-30 PROBLEM — D69.6 THROMBOCYTOPENIA: Status: ACTIVE | Noted: 2022-05-30

## 2022-05-30 PROBLEM — I31.39 PERICARDIAL EFFUSION: Status: ACTIVE | Noted: 2022-03-07

## 2022-05-30 PROBLEM — I67.83 PRES (POSTERIOR REVERSIBLE ENCEPHALOPATHY SYNDROME): Status: ACTIVE | Noted: 2022-05-30

## 2022-05-30 PROBLEM — R17 ELEVATED BILIRUBIN: Status: RESOLVED | Noted: 2020-03-02 | Resolved: 2022-05-30

## 2022-05-30 PROBLEM — E87.5 HYPERKALEMIA: Status: RESOLVED | Noted: 2020-12-13 | Resolved: 2022-05-30

## 2022-05-30 PROBLEM — D64.9 ANEMIA: Status: RESOLVED | Noted: 2021-04-24 | Resolved: 2022-05-30

## 2022-05-30 LAB
ALBUMIN SERPL BCP-MCNC: 2 G/DL (ref 3.5–5.2)
ALP SERPL-CCNC: 67 U/L (ref 55–135)
ALT SERPL W/O P-5'-P-CCNC: 20 U/L (ref 10–44)
ANION GAP SERPL CALC-SCNC: 10 MMOL/L (ref 8–16)
AST SERPL-CCNC: 22 U/L (ref 10–40)
BASOPHILS # BLD AUTO: 0.05 K/UL (ref 0–0.2)
BASOPHILS NFR BLD: 0.6 % (ref 0–1.9)
BILIRUB SERPL-MCNC: 0.9 MG/DL (ref 0.1–1)
BUN SERPL-MCNC: 45 MG/DL (ref 6–20)
CALCIUM SERPL-MCNC: 8.2 MG/DL (ref 8.7–10.5)
CHLORIDE SERPL-SCNC: 101 MMOL/L (ref 95–110)
CO2 SERPL-SCNC: 24 MMOL/L (ref 23–29)
CREAT SERPL-MCNC: 3.9 MG/DL (ref 0.5–1.4)
DIFFERENTIAL METHOD: ABNORMAL
EOSINOPHIL # BLD AUTO: 0.1 K/UL (ref 0–0.5)
EOSINOPHIL NFR BLD: 1.2 % (ref 0–8)
ERYTHROCYTE [DISTWIDTH] IN BLOOD BY AUTOMATED COUNT: 14.3 % (ref 11.5–14.5)
EST. GFR  (AFRICAN AMERICAN): 17 ML/MIN/1.73 M^2
EST. GFR  (NON AFRICAN AMERICAN): 14 ML/MIN/1.73 M^2
GLUCOSE SERPL-MCNC: 184 MG/DL (ref 70–110)
HCT VFR BLD AUTO: 34 % (ref 37–48.5)
HCV AB SERPL QL IA: NEGATIVE
HGB BLD-MCNC: 12.2 G/DL (ref 12–16)
HIV 1+2 AB+HIV1 P24 AG SERPL QL IA: NEGATIVE
IMM GRANULOCYTES # BLD AUTO: 0.04 K/UL (ref 0–0.04)
IMM GRANULOCYTES NFR BLD AUTO: 0.4 % (ref 0–0.5)
LYMPHOCYTES # BLD AUTO: 1.3 K/UL (ref 1–4.8)
LYMPHOCYTES NFR BLD: 14.7 % (ref 18–48)
MAGNESIUM SERPL-MCNC: 1.9 MG/DL (ref 1.6–2.6)
MCH RBC QN AUTO: 29.4 PG (ref 27–31)
MCHC RBC AUTO-ENTMCNC: 35.9 G/DL (ref 32–36)
MCV RBC AUTO: 82 FL (ref 82–98)
MONOCYTES # BLD AUTO: 0.7 K/UL (ref 0.3–1)
MONOCYTES NFR BLD: 7.9 % (ref 4–15)
NEUTROPHILS # BLD AUTO: 6.8 K/UL (ref 1.8–7.7)
NEUTROPHILS NFR BLD: 75.2 % (ref 38–73)
NRBC BLD-RTO: 0 /100 WBC
PHOSPHATE SERPL-MCNC: 3.8 MG/DL (ref 2.7–4.5)
PLATELET # BLD AUTO: 140 K/UL (ref 150–450)
PMV BLD AUTO: 10.1 FL (ref 9.2–12.9)
POCT GLUCOSE: 188 MG/DL (ref 70–110)
POCT GLUCOSE: 201 MG/DL (ref 70–110)
POCT GLUCOSE: 209 MG/DL (ref 70–110)
POCT GLUCOSE: 212 MG/DL (ref 70–110)
POTASSIUM SERPL-SCNC: 3.1 MMOL/L (ref 3.5–5.1)
PROT SERPL-MCNC: 4.9 G/DL (ref 6–8.4)
RBC # BLD AUTO: 4.15 M/UL (ref 4–5.4)
SODIUM SERPL-SCNC: 135 MMOL/L (ref 136–145)
WBC # BLD AUTO: 9.07 K/UL (ref 3.9–12.7)

## 2022-05-30 PROCEDURE — 97530 THERAPEUTIC ACTIVITIES: CPT

## 2022-05-30 PROCEDURE — 94761 N-INVAS EAR/PLS OXIMETRY MLT: CPT

## 2022-05-30 PROCEDURE — 85025 COMPLETE CBC W/AUTO DIFF WBC: CPT | Performed by: STUDENT IN AN ORGANIZED HEALTH CARE EDUCATION/TRAINING PROGRAM

## 2022-05-30 PROCEDURE — 27000221 HC OXYGEN, UP TO 24 HOURS

## 2022-05-30 PROCEDURE — 25000003 PHARM REV CODE 250: Performed by: STUDENT IN AN ORGANIZED HEALTH CARE EDUCATION/TRAINING PROGRAM

## 2022-05-30 PROCEDURE — 84100 ASSAY OF PHOSPHORUS: CPT | Performed by: STUDENT IN AN ORGANIZED HEALTH CARE EDUCATION/TRAINING PROGRAM

## 2022-05-30 PROCEDURE — 97162 PT EVAL MOD COMPLEX 30 MIN: CPT

## 2022-05-30 PROCEDURE — 80053 COMPREHEN METABOLIC PANEL: CPT | Performed by: STUDENT IN AN ORGANIZED HEALTH CARE EDUCATION/TRAINING PROGRAM

## 2022-05-30 PROCEDURE — 36415 COLL VENOUS BLD VENIPUNCTURE: CPT | Performed by: STUDENT IN AN ORGANIZED HEALTH CARE EDUCATION/TRAINING PROGRAM

## 2022-05-30 PROCEDURE — 63600175 PHARM REV CODE 636 W HCPCS: Performed by: INTERNAL MEDICINE

## 2022-05-30 PROCEDURE — 99900035 HC TECH TIME PER 15 MIN (STAT)

## 2022-05-30 PROCEDURE — 20000000 HC ICU ROOM

## 2022-05-30 PROCEDURE — 83735 ASSAY OF MAGNESIUM: CPT | Performed by: STUDENT IN AN ORGANIZED HEALTH CARE EDUCATION/TRAINING PROGRAM

## 2022-05-30 PROCEDURE — C9113 INJ PANTOPRAZOLE SODIUM, VIA: HCPCS | Performed by: STUDENT IN AN ORGANIZED HEALTH CARE EDUCATION/TRAINING PROGRAM

## 2022-05-30 PROCEDURE — 63600175 PHARM REV CODE 636 W HCPCS: Performed by: STUDENT IN AN ORGANIZED HEALTH CARE EDUCATION/TRAINING PROGRAM

## 2022-05-30 RX ORDER — LEVETIRACETAM 500 MG/5ML
500 INJECTION, SOLUTION, CONCENTRATE INTRAVENOUS EVERY 12 HOURS
Status: DISCONTINUED | OUTPATIENT
Start: 2022-05-30 | End: 2022-06-07 | Stop reason: HOSPADM

## 2022-05-30 RX ORDER — DEXTROSE MONOHYDRATE, SODIUM CHLORIDE, AND POTASSIUM CHLORIDE 50; 1.49; 4.5 G/1000ML; G/1000ML; G/1000ML
INJECTION, SOLUTION INTRAVENOUS CONTINUOUS
Status: DISCONTINUED | OUTPATIENT
Start: 2022-05-30 | End: 2022-06-01

## 2022-05-30 RX ADMIN — LEVETIRACETAM 500 MG: 100 INJECTION INTRAVENOUS at 11:05

## 2022-05-30 RX ADMIN — PANTOPRAZOLE SODIUM 40 MG: 40 INJECTION, POWDER, LYOPHILIZED, FOR SOLUTION INTRAVENOUS at 08:05

## 2022-05-30 RX ADMIN — MUPIROCIN: 20 OINTMENT TOPICAL at 09:05

## 2022-05-30 RX ADMIN — POTASSIUM CHLORIDE, DEXTROSE MONOHYDRATE AND SODIUM CHLORIDE: 150; 5; 450 INJECTION, SOLUTION INTRAVENOUS at 11:05

## 2022-05-30 RX ADMIN — MUPIROCIN: 20 OINTMENT TOPICAL at 08:05

## 2022-05-30 RX ADMIN — NICARDIPINE HYDROCHLORIDE 5 MG/HR: 0.2 INJECTION, SOLUTION INTRAVENOUS at 12:05

## 2022-05-30 RX ADMIN — NICARDIPINE HYDROCHLORIDE 7 MG/HR: 0.2 INJECTION, SOLUTION INTRAVENOUS at 07:05

## 2022-05-30 RX ADMIN — INSULIN ASPART 2 UNITS: 100 INJECTION, SOLUTION INTRAVENOUS; SUBCUTANEOUS at 05:05

## 2022-05-30 RX ADMIN — LEVETIRACETAM 500 MG: 100 INJECTION INTRAVENOUS at 09:05

## 2022-05-30 RX ADMIN — INSULIN ASPART 2 UNITS: 100 INJECTION, SOLUTION INTRAVENOUS; SUBCUTANEOUS at 12:05

## 2022-05-30 NOTE — PLAN OF CARE
Problem: Physical Therapy  Goal: Physical Therapy Goal  Description: Goals to be met by: 2022     Patient will increase functional independence with mobility by performin. Supine to sit with MInimal Assistance  2. Sit to stand transfer with Minimal Assistance  3. Bed to chair transfer with Minimal Assistance using Rolling Walker  4. Gait  x 250 feet with Minimal Assistance using Rolling Walker.   5. Lower extremity exercise program x20 reps  Outcome: Ongoing, Progressing   PT eval and treat completed. Min/mod assist to sit EOB. Stood briefly and took few side steps. Remained sleepy but could be awakened for therapy. C/o abdominal discomfort.

## 2022-05-30 NOTE — ASSESSMENT & PLAN NOTE
Blood sugar well controlled.  -D5-1/2 NS IV fluids  -SSI  -Q6H glucose checks  -Hypoglycemic precautions  -NPO

## 2022-05-30 NOTE — CONSULTS
"  [facility]  Adult Nutrition  Consult Note    SUMMARY   Intervention:carbohydrate modified diet     Recommendations  Recommendation/Intervention: 1.) When appropriate per MD recommend 1800 diabetic cardiac diet   Goals: 1.) diet will advance within 48 hrs.  Nutrition Goal Status: new  Communication of RD Recs: other (comment) (POC)    1. Hypoglycemia    2. Seizure      Past Medical History:   Diagnosis Date    CKD (chronic kidney disease), stage IV 4/12/2022    Diabetes mellitus due to underlying condition with unspecified complications 4/12/2022    Gastroparesis 4/12/2022    Heart failure with preserved ejection fraction 4/12/2022    EF 55% on 3/22    History of gastroesophageal reflux (GERD)     History of supraventricular tachycardia     Sickle cell trait 4/12/2022    Type 2 diabetes mellitus          Assessment and Plan  Nutrition Problem  Inadequate energy intake    Related to (etiology):   Acute illness    Signs and Symptoms (as evidenced by):   NPO, nausea/vomiting     Interventions/Recommendations (treatment strategy):  Advance diet when appropriate     Nutrition Diagnosis Status:   New         Malnutrition Assessment  Edema (Fluid Accumulation): 3-->moderate   Fluid Accumulation Evaluation: moderate    Body mass index is 28.59 kg/m².        Reason for Assessment  Reason For Assessment: consult  General Information Comments: admits with seizure, epilepsy + nausea/vomiting, lethargy. Remains NPO and drowsy per chart review. RD consulted for malnutrition, will f/u with [facility] after diet advances. Per chart review, no weight loss noted, but edema noted (+3, +4).    Nutrition Risk Screen  Nutrition Risk Screen: no indicators present    Nutrition/Diet History  Food Allergies: NKFA  Factors Affecting Nutritional Intake: NPO, nausea/vomiting    Anthropometrics  Temp: 98.1 °F (36.7 °C)  Height Method: Estimated  Height: 5' 2"  Height (inches): 62 in  Weight Method: Bed Scale  Weight: 70.9 " kg (156 lb 4.9 oz)  Weight (lb): 156.31 lb  Ideal Body Weight (IBW), Female: 110 lb  % Ideal Body Weight, Female (lb): 142.1 %  BMI (Calculated): 28.6  BMI Grade: 25 - 29.9 - overweight  Usual Body Weight (UBW), k kg  % Usual Body Weight: 118.41  % Weight Change From Usual Weight: 18.17 %       Lab/Procedures/Meds  Pertinent Labs Reviewed: reviewed  BMP  Lab Results   Component Value Date     (L) 2022    K 3.1 (L) 2022     2022    CO2 24 2022    BUN 45 (H) 2022    CREATININE 3.9 (H) 2022    CALCIUM 8.2 (L) 2022    ANIONGAP 10 2022    ESTGFRAFRICA 17 (A) 2022    EGFRNONAA 14 (A) 2022     Lab Results   Component Value Date    ALBUMIN 2.0 (L) 2022     Lab Results   Component Value Date    CALCIUM 8.2 (L) 2022    PHOS 3.8 2022     Recent Labs   Lab 22  1203   POCTGLUCOSE 212*       Pertinent Medications Reviewed: reviewed  Scheduled Meds:   levetiracetam IV  500 mg Intravenous Q12H    mupirocin   Nasal BID    pantoprazole  40 mg Intravenous Daily     Continuous Infusions:   dextrose 5 % and 0.45 % NaCl with KCl 20 mEq 50 mL/hr at 22 1218    niCARdipine 5 mg/hr (22 1228)         Estimated/Assessed Needs  Weight Used For Calorie Calculations: 70.9 kg (156 lb 4.9 oz)  Energy Calorie Requirements (kcal): 4079-1169 kcals/day  Energy Need Method: [place] (x1.3af)  Protein Requirements: 71-85g  Weight Used For Protein Calculations: 70.9 kg (156 lb 4.9 oz)  Estimated Fluid Requirement Method: RDA Method  RDA Method (mL): 1700      Nutrition Prescription Ordered  Current Diet Order: NPO    Evaluation of Received Nutrient/Fluid Intake  Other Calories (kcal): 204 (D5)  IV Fluid (mL): 1200  Energy Calories Required: not meeting needs  Protein Required: not meeting needs  Tolerance:  (NPO)  % Intake of Estimated Energy Needs: 0 - 25 %  % Meal Intake: NPO    Nutrition Risk  Level of Risk/Frequency of  Follow-up:  (x2 weekly)       Monitor and Evaluation  Food and Nutrient Intake: energy intake, food and beverage intake  Food and Nutrient Adminstration: diet order  Anthropometric Measurements: weight, weight change, body mass index  Biochemical Data, Medical Tests and Procedures: electrolyte and renal panel, glucose/endocrine profile, lipid profile  Nutrition-Focused Physical Findings: overall appearance       Nutrition Follow-Up  RD Follow-up?: Yes

## 2022-05-30 NOTE — ASSESSMENT & PLAN NOTE
Induced by hypoglycemia and/or PRES.  -Neurology consulted  -Neuro checks  -Seizure, fall and aspiration precautions  -PRN IV Ativan  -Cardene infusion for BP control

## 2022-05-30 NOTE — PLAN OF CARE
Safety precautions maintained, VSS. Nicardipine gtt monitored, titrated to map <105. 2.5mg/min maintained over night with good outcome. Pt slept well throughout night, continues to be confused- oriented only to self.  Midline, [place] and piv remained intact. Covered by ssi x2.     Problem: Adult Inpatient Plan of Care  Goal: Plan of Care Review  Outcome: Ongoing, Progressing  Goal: Patient-Specific Goal (Individualized)  Outcome: Ongoing, Progressing  Goal: Absence of Hospital-Acquired Illness or Injury  Outcome: Ongoing, Progressing  Goal: Optimal Comfort and Wellbeing  Outcome: Ongoing, Progressing  Goal: Readiness for Transition of Care  Outcome: Ongoing, Progressing     Problem: Infection  Goal: Absence of Infection Signs and Symptoms  Outcome: Ongoing, Progressing     Problem: Diabetes Comorbidity  Goal: Blood Glucose Level Within Targeted Range  Outcome: Ongoing, Progressing     Problem: Fall Injury Risk  Goal: Absence of Fall and Fall-Related Injury  Outcome: Ongoing, Progressing     Problem: Skin Injury Risk Increased  Goal: Skin Health and Integrity  Outcome: Ongoing, Progressing     Problem: Adjustment to Illness (Delirium)  Goal: Optimal Coping  Outcome: Ongoing, Progressing     Problem: Altered Behavior (Delirium)  Goal: Improved Behavioral Control  Outcome: Ongoing, Progressing     Problem: Attention and Thought Clarity Impairment (Delirium)  Goal: Improved Attention and Thought Clarity  Outcome: Ongoing, Progressing     Problem: Sleep Disturbance (Delirium)  Goal: Improved Sleep  Outcome: Ongoing, Progressing

## 2022-05-30 NOTE — PLAN OF CARE
Intervention:carbohydrate modified diet      Recommendations  Recommendation/Intervention: 1.) When appropriate per MD recommend 1800 diabetic cardiac diet   Goals: 1.) diet will advance within 48 hrs.  Nutrition Goal Status: new  Communication of RD Recs: other (comment) (POC)

## 2022-05-30 NOTE — PROGRESS NOTES
[facility]  Progress Note  Date: 2022 10:35 AM            Patient Name: Tabby Howard   MRN: [id number]   : 1990    AGE: 32 y.o.    LOS: 2 days Hospital Day: 3  Admit date: 2022  8:17 PM         HPI per EMR: Tabby Howard is a 32 y.o. female with a history of anemia, CKD, type 2 diabetes, gallstones, CKD, chronic diastolic heart failure, and gastroparesis. presents emergency room for evaluation of seizure and hypoglycemia.  Patient has a history of diabetes and gastroparesis.  She reports that she has been taking her insulin but not able to hold any food down due to nausea and vomiting.  She was treated several times in the ER for hypoglycemia using dextrose 50%.  In the ER, patient was reported to have clonic tonic seizure activity which lasted approximately 1 minute.  Patient was given Ativan status post seizure as well as given a Keppra loading dose.     Neurology consult:  Patient was seen examined by me this morning.  She is lethargic, opens eyes to stimulus is able to tell me her name however she does not follow commands or answer any other questions.  She is able to move all extremities symmetrically.     Patient presented to the hospital with severe nausea and vomiting.  In the ER, she was reported to have a generalized tonic-clonic seizure lasting for about a minute.  She did bite her tongue during the episode.  Prior to the episode, her blood sugar was 48. She received a loading dose of Keppra.  She was admitted to the ICU for further care.    2022: No acute events overnight. Patient was seen and examined by me this morning. Neuro exam is improving.  Patient is drowsy however wakes up to stimulus and is oriented to place, year but not to month.  She is able to follow commands.       Vitals:  Patient Vitals for the past 24 hrs:   BP Temp Temp src Pulse Resp SpO2 Weight   22 1000 (!) 162/95 -- -- 95 15 (!) 94 % --   22 0900 (!) 158/96 -- -- 96 (!) 21 96  % --   05/30/22 0800 (!) 156/91 98.1 °F (36.7 °C) Oral 95 14 -- --   05/30/22 0736 -- -- -- 95 13 95 % --   05/30/22 0700 (!) 157/86 -- -- 97 13 (!) 94 % --   05/30/22 0600 120/86 -- -- 101 11 -- --   05/30/22 0500 (!) 140/79 -- -- 97 14 (!) 93 % --   05/30/22 0400 131/81 97.8 °F (36.6 °C) Axillary 96 12 95 % 70.9 kg (156 lb 4.9 oz)   05/30/22 0200 (!) 149/83 -- -- 98 17 97 % --   05/30/22 0100 (!) 143/84 -- -- 98 -- 97 % --   05/30/22 0000 (!) 145/81 98 °F (36.7 °C) Axillary 101 (!) 21 97 % --   05/29/22 2300 136/88 -- -- 101 16 98 % --   05/29/22 2200 (!) 144/87 -- -- 102 13 99 % --   05/29/22 2100 (!) 143/81 -- -- 102 17 (!) 92 % --   05/29/22 2030 135/80 -- -- 102 17 (!) 93 % --   05/29/22 2015 133/88 -- -- 103 18 (!) 94 % --   05/29/22 2000 135/84 98.4 °F (36.9 °C) Axillary 104 20 (!) 94 % --   05/29/22 1945 132/83 -- -- 106 18 (!) 93 % --   05/29/22 1930 129/87 -- -- 102 16 (!) 94 % --   05/29/22 1915 (!) 143/79 -- -- 103 17 (!) 94 % --   05/29/22 1900 135/79 -- -- 103 16 (!) 94 % --   05/29/22 1845 133/83 -- -- 102 17 (!) 94 % --   05/29/22 1830 137/83 -- -- 103 16 (!) 94 % --   05/29/22 1815 137/81 -- -- 103 17 (!) 94 % --   05/29/22 1800 137/75 -- -- 103 17 (!) 93 % --   05/29/22 1745 139/78 -- -- 103 17 (!) 94 % --   05/29/22 1730 133/79 -- -- 104 17 (!) 93 % --   05/29/22 1715 135/83 -- -- 104 17 (!) 94 % --   05/29/22 1700 (!) 152/97 -- -- 104 (!) 24 95 % --   05/29/22 1515 (!) 165/92 98 °F (36.7 °C) Axillary 105 15 96 % --   05/29/22 1500 (!) 137/93 -- -- 106 (!) 27 98 % --   05/29/22 1445 (!) 140/96 -- -- 103 11 98 % --   05/29/22 1430 127/84 -- -- 101 17 97 % --   05/29/22 1415 122/88 -- -- 104 (!) 41 98 % --   05/29/22 1400 (!) 141/89 -- -- 109 (!) 40 99 % --   05/29/22 1345 130/84 -- -- 107 (!) 9 100 % --   05/29/22 1330 134/81 -- -- 104 (!) 4 98 % --   05/29/22 1315 134/84 -- -- 100 18 (!) 92 % --   05/29/22 1300 139/82 -- -- 98 15 (!) 94 % --   05/29/22 1245 (!) 143/78 -- -- 97 17 (!) 94 % --    05/29/22 1230 (!) 157/86 -- -- 97 16 (!) 94 % --   05/29/22 1215 (!) 177/87 -- -- 94 18 95 % --   05/29/22 1200 (!) 201/102 -- -- 95 14 (!) 94 % --   05/29/22 1145 (!) 196/101 -- -- 95 14 (!) 94 % --   05/29/22 1130 (!) 169/106 97.8 °F (36.6 °C) Axillary 95 15 98 % --   05/29/22 1115 (!) 175/112 -- -- 97 (!) 23 97 % --   05/29/22 1100 (!) 180/106 -- -- 96 12 97 % --   05/29/22 1045 (!) 158/94 -- -- 96 17 99 % --     PHYSICAL EXAM:     GENERAL APPEARANCE: Well-developed, well-nourished female in no acute distress.  Patient is drowsy however wakes up to stimulus and follows commands.  HEENT: Normocephalic and atraumatic(except bruised/swollen lower lip). PERRL. Oropharynx unremarkable.  PULM: Comfortable on room air.  CV: RRR.  ABDOMEN: Soft, nontender.  EXTREMITIES: No signs of vascular compromise. Pulses present. No cyanosis, clubbing or edema.  SKIN: Clear; no rashes, lesions or skin breaks in exposed areas.      NEURO:   MENTAL STATUS: Patient drowsy however wakes up to stimulus and oriented to time, place, and person(except not oriented to month). Affect labile.  CRANIAL NERVES II-XII: Pupils equal, round and reactive to light. Extraocular movements full and intact. No facial asymmetry.  MOTOR: Normal bulk. Tone normal and symmetrical throughout.  No abnormal movements. No tremor.   Strength 5/5 in bilateral upper extremities and 4/5 in bilateral lower extremities.  REFLEXES: DTRs 2+; normal and symmetric throughout.   SENSATION: Sensation grossly intact to fine touch.  COORDINATION:  Could not assess  STATION: [place] deferred.  GAIT: Deferred.    CURRENT SCHEDULED MEDICATIONS:   mupirocin   Nasal BID    pantoprazole  40 mg Intravenous Daily     CURRENT INFUSIONS:   dextrose 5 % and 0.45 % NaCl with KCl 20 mEq      niCARdipine 3 mg/hr (05/30/22 0939)     DATA:  Recent Labs   Lab 05/28/22 2038 05/29/22  0241 05/30/22  0655    141 135*   K 3.5 3.7 3.1*    105 101   CO2 25 24 24   BUN 46* 46* 45*    CREATININE 3.9* 3.7* 3.9*   GLU 48* 108 184*   CALCIUM 8.9 8.6* 8.2*   PHOS  --  3.9 3.8   MG  --  2.0 1.9   AST 27 34 22   ALT 23 25 20   AMMONIA 25  --   --      Recent Labs   Lab 05/28/22 2038 05/29/22  0241 05/30/22  0655   WBC 6.88 10.46 9.07   HGB 12.2 11.9* 12.2   HCT 33.9* 34.4* 34.0*   * 107* 140*     No results found for: PROTEINCSF, GLUCCSF, WBCCSF, RBCCSF  Hemoglobin A1C   Date Value Ref Range Status   05/15/2022 5.8 (H) 4.7 - 5.6 % Final   03/06/2022 5.3 4.5 - 5.7 % Final   01/26/2022 6.8 (H) 4.7 - 5.6 % Final   04/24/2021 8.5 (H) 4.0 - 5.6 % Final     Comment:     ADA Screening Guidelines:  5.7-6.4%  Consistent with prediabetes  >or=6.5%  Consistent with diabetes    High levels of fetal hemoglobin interfere with the HbA1C  assay. Heterozygous hemoglobin variants (HbS, HgC, etc)do  not significantly interfere with this assay.   However, presence of multiple variants may affect accuracy.     09/10/2020 11.6 (H) 4.0 - 5.6 % Final     Comment:     ADA Screening Guidelines:  5.7-6.4%  Consistent with prediabetes  >or=6.5%  Consistent with diabetes  High levels of fetal hemoglobin interfere with the HbA1C  assay. Heterozygous hemoglobin variants (HbS, HgC, etc)do  not significantly interfere with this assay.   However, presence of multiple variants may affect accuracy.     02/27/2020 10.1 (H) 4.0 - 5.6 % Final     Comment:     ADA Screening Guidelines:  5.7-6.4%  Consistent with prediabetes  >or=6.5%  Consistent with diabetes  High levels of fetal hemoglobin interfere with the HbA1C  assay. Heterozygous hemoglobin variants (HbS, HgC, etc)do  not significantly interfere with this assay.   However, presence of multiple variants may affect accuracy.              I have personally reviewed and interpreted the pertinent imaging and lab results.  Imaging Results          CT Head Without Contrast (Final result)  Result time 05/28/22 21:10:20    Final result by Gianluca Juarez MD (05/28/22 21:10:20)                  Impression:      Negative CT of the brain for hemorrhage mass or infarction.    Small area of high density posterior to the trapezius muscle.  Correlate for posterior neck skull junction soft tissue hematoma.      Electronically signed by: Gianluca Juarez  Date:    05/28/2022  Time:    21:10             Narrative:    EXAMINATION:  CT HEAD WITHOUT CONTRAST    CLINICAL HISTORY:  Seizure, new-onset, no history of trauma;    TECHNIQUE:  Low dose axial images were obtained through the head.  Coronal and sagittal reformations were also performed. Contrast was not administered.    COMPARISON:  None.    FINDINGS:  There is a 2.2 x 1.1 cm area of high density in the subcutaneous tissues posterior to the trapezius muscle at the level of the internal occipital protuberance.  The remainder of the scalp and calvarium appear unremarkable.    The brain parenchyma appears normal in attenuation with normal gray-white matter differentiation and no mass effect or pathologic fluid collection.  There is no ventriculomegaly.    Mild mucosal thickening in the left maxillary sinus is present.  No air-fluid levels are seen.  The orbits and orbital contents appear unremarkable.                                        ASSESSMENT AND PLAN:    Posterior reversible encephalopathy syndrome  Seizure  Hypoglycemia  Hypertension     Workup:   CT head:  No acute intracranial abnormality  MRI brain:  Bilateral symmetric FLAIR hyperintensity in occipital lobes.  DWI images did not reveal any infarction.Findings concerning for posterior reversible encephalopathy syndrome  EEG:  Severe encephalopathy without epileptiform activity or seizures     Plan:  · Etiology of seizures likely secondary to PRES and hypoglycemia.  · Given MRI findings of posterior reversible encephalopathy syndrome at high risk of recurrent seizures, patient was started on Keppra maintenance dose of 500 b.i.d..  · Strict Control blood pressure less than 140 systolic.   Patient currently on nicardipine drip.  Titrate to goal blood pressure.  · Management of hypoglycemia per primary team.  · Seizure precautions.  · Neuro checks per unit protocol.  · Physical and Occupational therapy evaluate and treat.  · Avoid medications that lower seizure threshold.  · Will continue to follow     Seizure education.       No driving for now, no swimming alone, no climbing high areas, no operation of heavy machinery or working with high risk electricity equipment.        Patient and/or next of kin informed.  Follow up Neurology in 2 weeks. Call [phone] to make an appointment.        Thank you kindly for including us in the care of this patient. Please do not hesitate to contact us with any questions.    Critical Care:  44 minutes of critical care time has been spent evaluating with the patient. Time includes chart review not limited to diagnostic imaging, labs, and vitals, patient assessment, discussion with family and nursing, current order evaluations, and new order entries.      Neptali Marley MD  Neurology/vascular Neurology  Date of Service: 05/30/2022  10:35 AM    Please note: This note was transcribed using voice recognition software. Because of this technology there are often uinintended grammatical, spelling, and other transcription errors. Please disregard these errors.

## 2022-05-30 NOTE — CARE UPDATE
05/30/22 0736   Patient Assessment/Suction   Level of Consciousness (AVPU) responds to voice   PRE-TX-O2   O2 Device (Oxygen Therapy) room air   SpO2 95 %   Pulse Oximetry Type Continuous   $ Pulse Oximetry - Multiple Charge Pulse Oximetry - Multiple   Pulse 95   Resp 13   ETCO2   ETCO2 (mmHg) 0 mmHg   Aerosol Therapy   $ Aerosol Therapy Charges PRN treatment not required

## 2022-05-30 NOTE — HOSPITAL COURSE
Tabby Howard is a 32 year old female with a past medical history of IDDM, CKD IV, gastroparesis, and HFpEF who presented with seizure. She was discovered to be hypoglycemic and was started on a D5 infusion (since discontinued). Neurology was consulted. Her BP was observed to be elevated as well and she was started on a Cardene infusion. EEG of the brain did not show epileptiform activity, yet MRI of the brain showed concern for PRES. Keppra was started per Neurology 5/30. Her mental status continued to improve. Nephrology was consulted 6/1 given the patient's progressive CKD. Cardene was able to be weaned as she started on HCTZ, hydralazine and Imdur.  Nephrology has also scheduled IV Lasix in order to increase her urine output which has mildly improved her kidney function. She was able to be transferred out of the ICU 6/5/2022. She is working with PT/OT. Her kidney function continued to improve and she was cleared for discharge by nephrology on BID PO Lasix and will get repeat labs Monday and follow up with PCP and nephrology clinic. She will also follow up with neurology. She needs a repeat MRI in 3 months. Was instructed at length on seizure precautions.

## 2022-05-30 NOTE — PROGRESS NOTES
[facility] Hospital Medicine  Progress Note    Patient Name: Tabby Howard  MRN: [id number]  Patient Class: IP- Inpatient   Admission Date: 5/28/2022  Length of Stay: 2 days  Attending Physician: Raymundo Parker MD  Primary Care Provider: Primary Doctor No        Subjective:     Principal Problem:PRES (posterior reversible encephalopathy syndrome)        HPI:  Tabby Howard 32-year-old female presents emergency room for evaluation of seizure and hypoglycemia.  Patient has a history of diabetes and gastroparesis.  She reports that she has been taking her insulin but not able to hold any food down due to nausea and vomiting.  She was treated several times in the ER for hypoglycemia using dextrose 50%.  Just before my exam patient was reported to have clonic tonic seizure activity which lasted approximately 1 minute; however her glucose at the time was approximately 120. Previous medical history includes anemia, CKD, type 2 diabetes, gallstones, CKD, chronic diastolic heart failure, and gastroparesis.  ER workup:  CBC with thrombocytopenia of 125.  Original glucose on CMP was 48. BUN 46 and creatinine of 3.9 (baseline).  Urinalysis with 5 red blood cells and rare yeast.  CT the head was negative.  Patient was given Ativan status post seizure as well as given a Keppra loading dose.  Patient be admitted to Hospital Medicine for observation management.  Patient be placed in ICU given continue seizure and hypoglycemia.  Will consult Neurology in a.m..      Overview/Hospital Course:  Tabby Howard is a 32 year old female with a past medical history of IDDM, CKD, gastroparesis, and HFpEF who presented with seizure. She was discovered to be hypoglycemic and was started on a D5 infusion. Neurology was consulted and did not started any new medications. Her BP was observed to be elevated as well and she was started on a Cardene infusion. EEG of the brain did not show epileptiform activity, yet MRI of the  "brain is concerning for PRES. She remains encephalopathic and is currently being monitored in the ICU.      Interval History: see "Hospital Course"    Review of Systems   Unable to perform ROS: Mental status change   Objective:     Vital Signs (Most Recent):  Temp: 98.1 °F (36.7 °C) (05/30/22 0800)  Pulse: 96 (05/30/22 0900)  Resp: (!) 21 (05/30/22 0900)  BP: (!) 158/96 (05/30/22 0900)  SpO2: 96 % (05/30/22 0900)   Vital Signs (24h Range):  Temp:  [97.8 °F (36.6 °C)-98.4 °F (36.9 °C)] 98.1 °F (36.7 °C)  Pulse:  [] 96  Resp:  [4-41] 21  SpO2:  [92 %-100 %] 96 %  BP: (120-201)/() 158/96     Weight: 70.9 kg (156 lb 4.9 oz)  Body mass index is 28.59 kg/m².    Intake/Output Summary (Last 24 hours) at 5/30/2022 0941  Last data filed at 5/30/2022 0939  Gross per 24 hour   Intake 2216.07 ml   Output 658 ml   Net 1558.07 ml      Physical Exam  Vitals and nursing note reviewed.   Constitutional:       General: She is not in acute distress.     Appearance: She is well-developed. She is ill-appearing. She is not diaphoretic.   HENT:      Head: Normocephalic and atraumatic.      Right Ear: External ear normal.      Left Ear: External ear normal.      Nose: Nose normal.   Eyes:      General: No scleral icterus.     Conjunctiva/sclera: Conjunctivae normal.   Neck:      Vascular: No JVD.   Cardiovascular:      Rate and Rhythm: Normal rate and regular rhythm.      Pulses: Normal pulses.      Heart sounds: Normal heart sounds. No murmur heard.    No friction rub. No gallop.   Pulmonary:      Effort: Pulmonary effort is normal. No respiratory distress.      Breath sounds: Normal breath sounds. No wheezing or rales.   Abdominal:      General: Bowel sounds are normal. There is no distension.      Palpations: Abdomen is soft.      Tenderness: There is no abdominal tenderness. There is no guarding or rebound.   Musculoskeletal:         General: No tenderness. Normal range of motion.      Cervical back: Neck supple.      Right " lower leg: No edema.      Left lower leg: No edema.   Skin:     General: Skin is warm and dry.      Coloration: Skin is not pale.      Findings: No erythema or rash.   Neurological:      Mental Status: She is disoriented.       Significant Labs: All pertinent labs within the past 24 hours have been reviewed.    Significant Imaging: I have reviewed all pertinent imaging results/findings within the past 24 hours.      Assessment/Plan:      * PRES (posterior reversible encephalopathy syndrome)  -Monitor in ICU  -Seizure precautions  -PRN IV Ativan  -Cardene infusion for BP control      Seizure  Induced by hypoglycemia and/or PRES.  -Neurology consulted  -Neuro checks  -Seizure, fall and aspiration precautions  -PRN IV Ativan  -Cardene infusion for BP control        Thrombocytopenia  -Trend CBC  -No chemical DVT prophylaxis      Heart failure with preserved ejection fraction  -Telemetry  -Hold home PO medications          Gastroparesis  Chronic.  -NPO  -Diet and resumption of home medications when mental status improves      CKD (chronic kidney disease), stage IV  Chronic. Stable.  -Renally dose all medications  -Avoid nephrotoxic agents        Type II diabetes mellitus  Blood sugar well controlled.  -D5-1/2 NS IV fluids  -SSI  -Q6H glucose checks  -Hypoglycemic precautions  -NPO        VTE Risk Mitigation (From admission, onward)         Ordered     IP VTE LOW RISK PATIENT  Once         05/29/22 0004     Place sequential compression device  Until discontinued         05/29/22 0004     Place JOCELYNE hose  Until discontinued         05/29/22 0004                Discharge Planning   TATIANA:      Code Status: Full Code   Is the patient medically ready for discharge?:     Reason for patient still in hospital (select all that apply): Patient trending condition, Treatment and Consult recommendations  Discharge Plan A: Home with family            Critical care time spent on the evaluation and treatment of severe organ dysfunction,  review of pertinent labs and imaging studies, discussions with consulting providers and discussions with patient/family: 31 minutes.      Raymundo Parker MD  Department of Hospital Medicine   [facility]

## 2022-05-30 NOTE — PLAN OF CARE
POC reviewed w/ pt - needs reinforcement. Pt drowsy throughout shift and intermittently confused and orientation varies, awakens by voice mostly. Calm and cooperative and follows commands. No seizure activity, keppra started today as well. VSS. Afebrile. Continues cardene gtt - per neuro, SBP goal <140. 2L nasal cannula - sats >93%. Coughs and clears airway. Attempted bedside swallow study multiple times throughout shift - pt refused and stated she isn't hungry or thirsty. Pelayo in place - UO minimal at 20-40cc/hr. On maintenance fluids D5 1/2NS w/ 20 mEq Kcl at 50cc/hr. Accuchecks q6hrs - covered per sliding scale. Got up w/ PT today - was able to sit on edge of bed and stand w/ a few side steps, remains fairly weak. Skin integrity good but still generalized swelling, particularly face but appears to be able to open eyes further throughout day. Bed in locked and low position, side rails raised, bed alarm on, call light in reach. Safety precautions in place.     Problem: Adult Inpatient Plan of Care  Goal: Plan of Care Review  Outcome: Ongoing, Progressing  Goal: Patient-Specific Goal (Individualized)  Outcome: Ongoing, Progressing  Goal: Readiness for Transition of Care  Outcome: Ongoing, Progressing     Problem: Infection  Goal: Absence of Infection Signs and Symptoms  Outcome: Ongoing, Progressing     Problem: Diabetes Comorbidity  Goal: Blood Glucose Level Within Targeted Range  Outcome: Ongoing, Progressing     Problem: Fall Injury Risk  Goal: Absence of Fall and Fall-Related Injury  Outcome: Ongoing, Progressing     Problem: Skin Injury Risk Increased  Goal: Skin Health and Integrity  Outcome: Ongoing, Progressing     Problem: Adjustment to Illness (Delirium)  Goal: Optimal Coping  Outcome: Ongoing, Progressing

## 2022-05-30 NOTE — PT/OT/SLP EVAL
Physical Therapy Evaluation    Patient Name:  Tabby Howard   MRN:  [id number]    Recommendations:     Discharge Recommendations:  home health PT   Discharge Equipment Recommendations: none   Barriers to discharge: None    Assessment:     Tabby Howard is a 32 y.o. female admitted with a medical diagnosis of PRES (posterior reversible encephalopathy syndrome).  She presents with the following impairments/functional limitations:  weakness, impaired endurance, impaired functional mobilty, gait instability, impaired balance, impaired cognition, decreased lower extremity function, decreased safety awareness, pain, edema, impaired cardiopulmonary response to activity .    Pt seen at ICU- asleep but easily awakened. Pt with facial edema/extremeties edema. Pt moving all extremeties. Supine to sit EOB mod assist. Stood with mod assist and able to take several side steps. Back to bed post PT. Pt c/o abdominal discomfort- nurse Jaylyn sims. Pt to benefit from continued therapies.    Rehab Prognosis: Fair; patient would benefit from acute skilled PT services to address these deficits and reach maximum level of function.    Recent Surgery: * No surgery found *      Plan:     During this hospitalization, patient to be seen 6 x/week to address the identified rehab impairments via gait training, therapeutic activities, therapeutic exercises and progress toward the following goals:    · Plan of Care Expires:  06/30/22    Subjective   Pt sleepy but awakened- face is puffy, voice low  Stated that she lives with 3 daughters 14,12 and 6 y/o    Chief Complaint: abdominal pain  Patient/Family Comments/goals: none stated  Pain/Comfort:  · Pain Rating 1:  (not rated)  · Location 1: abdomen  · Pain Addressed 1: Reposition, Distraction, Cessation of Activity, Nurse notified    Patients cultural, spiritual, Zoroastrian conflicts given the current situation:      Living Environment:  Home with dependent daughters  Prior to admission, patients  level of function was ambulatory/independent.  Equipment used at home: none.  DME owned (not currently used): none.  Upon discharge, patient will have assistance from family.    Objective:     Communicated with nurse De La O prior to session.  Patient found right sidelying with telemetry, pulse ox (continuous), blood pressure cuff, bed alarm, [place] catheter, peripheral IV  upon PT entry to room.    General Precautions: Standard, fall, seizure, diabetic   Orthopedic Precautions:N/A   Braces: N/A  Respiratory Status: Nasal cannula, flow 2 L/min    Exams:  · Postural Exam:  Patient presented with the following abnormalities:    · -       Rounded shoulders  · -       Forward head  · -       BMI 28  · Skin Integrity/Edema:      · -       facial and UE's and LE's edema  · RLE ROM: WFL   · RLE Strength: Deficits: 3/5  · LLE ROM: WFL  · LLE Strength: Deficits: 3/5    Functional Mobility:  · Bed Mobility:     · Scooting: moderate assistance  · Bridging: moderate assistance  · Supine to Sit: moderate assistance  · Sit to Supine: moderate assistance  · Transfers:     · Sit to Stand:  moderate assistance with hand-held assist  · Gait: few side steps with HHA mod assist    Therapeutic Activities and Exercises:   Patient was educated on the importance of OOB activity and functional mobility to negate negative effects of prolonged bed rest during hospitalization, safe transfers and ambulation, and D/C planning   thera ex with AP,HS  EOB sitting/standing mod assist  Back to bed post PT and repositioned HOB    AM-PAC 6 CLICK MOBILITY  Total Score:12     Patient left HOB elevated with all lines intact, call button in reach, bed alarm on and nurse Jaylyn present.    GOALS:   Multidisciplinary Problems     Physical Therapy Goals        Problem: Physical Therapy    Goal Priority Disciplines Outcome Goal Variances Interventions   Physical Therapy Goal     PT, PT/OT Ongoing, Progressing     Description: Goals to be met by: 06-      Patient will increase functional independence with mobility by performin. Supine to sit with MInimal Assistance  2. Sit to stand transfer with Minimal Assistance  3. Bed to chair transfer with Minimal Assistance using Rolling Walker  4. Gait  x 250 feet with Minimal Assistance using Rolling Walker.   5. Lower extremity exercise program x20 reps                   History:     Past Medical History:   Diagnosis Date    CKD (chronic kidney disease), stage IV 2022    Diabetes mellitus due to underlying condition with unspecified complications 2022    Gastroparesis 2022    Heart failure with preserved ejection fraction 2022    EF 55% on 3/22    History of gastroesophageal reflux (GERD)     History of supraventricular tachycardia     Sickle cell trait 2022    Type 2 diabetes mellitus        Past Surgical History:   Procedure Laterality Date     SECTION      x 3    ESOPHAGOGASTRODUODENOSCOPY N/A 10/18/2019    Procedure: ESOPHAGOGASTRODUODENOSCOPY (EGD);  Surgeon: Gianluca Mendez MD;  Location: [facility];  Service: Endoscopy;  Laterality: N/A;       Time Tracking:     PT Received On: 22  PT Start Time: 1321     PT Stop Time: 1338  PT Total Time (min): 17 min     Billable Minutes: Evaluation 8 and Therapeutic Activity 9      2022

## 2022-05-30 NOTE — SUBJECTIVE & OBJECTIVE
"Interval History: see "Hospital Course"    Review of Systems   Unable to perform ROS: Mental status change   Objective:     Vital Signs (Most Recent):  Temp: 98.1 °F (36.7 °C) (05/30/22 0800)  Pulse: 96 (05/30/22 0900)  Resp: (!) 21 (05/30/22 0900)  BP: (!) 158/96 (05/30/22 0900)  SpO2: 96 % (05/30/22 0900)   Vital Signs (24h Range):  Temp:  [97.8 °F (36.6 °C)-98.4 °F (36.9 °C)] 98.1 °F (36.7 °C)  Pulse:  [] 96  Resp:  [4-41] 21  SpO2:  [92 %-100 %] 96 %  BP: (120-201)/() 158/96     Weight: 70.9 kg (156 lb 4.9 oz)  Body mass index is 28.59 kg/m².    Intake/Output Summary (Last 24 hours) at 5/30/2022 0941  Last data filed at 5/30/2022 0939  Gross per 24 hour   Intake 2216.07 ml   Output 658 ml   Net 1558.07 ml      Physical Exam  Vitals and nursing note reviewed.   Constitutional:       General: She is not in acute distress.     Appearance: She is well-developed. She is ill-appearing. She is not diaphoretic.   HENT:      Head: Normocephalic and atraumatic.      Right Ear: External ear normal.      Left Ear: External ear normal.      Nose: Nose normal.   Eyes:      General: No scleral icterus.     Conjunctiva/sclera: Conjunctivae normal.   Neck:      Vascular: No JVD.   Cardiovascular:      Rate and Rhythm: Normal rate and regular rhythm.      Pulses: Normal pulses.      Heart sounds: Normal heart sounds. No murmur heard.    No friction rub. No gallop.   Pulmonary:      Effort: Pulmonary effort is normal. No respiratory distress.      Breath sounds: Normal breath sounds. No wheezing or rales.   Abdominal:      General: Bowel sounds are normal. There is no distension.      Palpations: Abdomen is soft.      Tenderness: There is no abdominal tenderness. There is no guarding or rebound.   Musculoskeletal:         General: No tenderness. Normal range of motion.      Cervical back: Neck supple.      Right lower leg: No edema.      Left lower leg: No edema.   Skin:     General: Skin is warm and dry.      " Coloration: Skin is not pale.      Findings: No erythema or rash.   Neurological:      Mental Status: She is disoriented.       Significant Labs: All pertinent labs within the past 24 hours have been reviewed.    Significant Imaging: I have reviewed all pertinent imaging results/findings within the past 24 hours.

## 2022-05-31 LAB
ALBUMIN SERPL BCP-MCNC: 1.9 G/DL (ref 3.5–5.2)
ALP SERPL-CCNC: 66 U/L (ref 55–135)
ALT SERPL W/O P-5'-P-CCNC: 20 U/L (ref 10–44)
ANION GAP SERPL CALC-SCNC: 12 MMOL/L (ref 8–16)
AST SERPL-CCNC: 21 U/L (ref 10–40)
BASOPHILS # BLD AUTO: 0.05 K/UL (ref 0–0.2)
BASOPHILS NFR BLD: 0.6 % (ref 0–1.9)
BILIRUB SERPL-MCNC: 0.7 MG/DL (ref 0.1–1)
BUN SERPL-MCNC: 45 MG/DL (ref 6–20)
CALCIUM SERPL-MCNC: 7.8 MG/DL (ref 8.7–10.5)
CHLORIDE SERPL-SCNC: 102 MMOL/L (ref 95–110)
CO2 SERPL-SCNC: 21 MMOL/L (ref 23–29)
CREAT SERPL-MCNC: 4.3 MG/DL (ref 0.5–1.4)
DIFFERENTIAL METHOD: ABNORMAL
EOSINOPHIL # BLD AUTO: 0.2 K/UL (ref 0–0.5)
EOSINOPHIL NFR BLD: 2.3 % (ref 0–8)
ERYTHROCYTE [DISTWIDTH] IN BLOOD BY AUTOMATED COUNT: 14 % (ref 11.5–14.5)
EST. GFR  (AFRICAN AMERICAN): 15 ML/MIN/1.73 M^2
EST. GFR  (NON AFRICAN AMERICAN): 13 ML/MIN/1.73 M^2
FERRITIN SERPL-MCNC: 2077 NG/ML (ref 20–300)
GLUCOSE SERPL-MCNC: 185 MG/DL (ref 70–110)
HCT VFR BLD AUTO: 30.6 % (ref 37–48.5)
HGB BLD-MCNC: 10.8 G/DL (ref 12–16)
IMM GRANULOCYTES # BLD AUTO: 0.02 K/UL (ref 0–0.04)
IMM GRANULOCYTES NFR BLD AUTO: 0.3 % (ref 0–0.5)
IRON SERPL-MCNC: 44 UG/DL (ref 30–160)
LYMPHOCYTES # BLD AUTO: 1.6 K/UL (ref 1–4.8)
LYMPHOCYTES NFR BLD: 19.8 % (ref 18–48)
MAGNESIUM SERPL-MCNC: 1.9 MG/DL (ref 1.6–2.6)
MCH RBC QN AUTO: 28.7 PG (ref 27–31)
MCHC RBC AUTO-ENTMCNC: 35.3 G/DL (ref 32–36)
MCV RBC AUTO: 81 FL (ref 82–98)
MONOCYTES # BLD AUTO: 0.8 K/UL (ref 0.3–1)
MONOCYTES NFR BLD: 9.6 % (ref 4–15)
NEUTROPHILS # BLD AUTO: 5.4 K/UL (ref 1.8–7.7)
NEUTROPHILS NFR BLD: 67.4 % (ref 38–73)
NRBC BLD-RTO: 0 /100 WBC
PHOSPHATE SERPL-MCNC: 4.1 MG/DL (ref 2.7–4.5)
PLATELET # BLD AUTO: 131 K/UL (ref 150–450)
PMV BLD AUTO: 9.1 FL (ref 9.2–12.9)
POCT GLUCOSE: 233 MG/DL (ref 70–110)
POCT GLUCOSE: 280 MG/DL (ref 70–110)
POCT GLUCOSE: 372 MG/DL (ref 70–110)
POCT GLUCOSE: 405 MG/DL (ref 70–110)
POTASSIUM SERPL-SCNC: 3.5 MMOL/L (ref 3.5–5.1)
PROT SERPL-MCNC: 4.8 G/DL (ref 6–8.4)
RBC # BLD AUTO: 3.76 M/UL (ref 4–5.4)
SATURATED IRON: 31 % (ref 20–50)
SODIUM SERPL-SCNC: 135 MMOL/L (ref 136–145)
TOTAL IRON BINDING CAPACITY: 142 UG/DL (ref 250–450)
TRANSFERRIN SERPL-MCNC: 96 MG/DL (ref 200–375)
WBC # BLD AUTO: 7.98 K/UL (ref 3.9–12.7)

## 2022-05-31 PROCEDURE — 84100 ASSAY OF PHOSPHORUS: CPT | Performed by: STUDENT IN AN ORGANIZED HEALTH CARE EDUCATION/TRAINING PROGRAM

## 2022-05-31 PROCEDURE — 84466 ASSAY OF TRANSFERRIN: CPT | Performed by: STUDENT IN AN ORGANIZED HEALTH CARE EDUCATION/TRAINING PROGRAM

## 2022-05-31 PROCEDURE — 83735 ASSAY OF MAGNESIUM: CPT | Performed by: STUDENT IN AN ORGANIZED HEALTH CARE EDUCATION/TRAINING PROGRAM

## 2022-05-31 PROCEDURE — 36415 COLL VENOUS BLD VENIPUNCTURE: CPT | Performed by: STUDENT IN AN ORGANIZED HEALTH CARE EDUCATION/TRAINING PROGRAM

## 2022-05-31 PROCEDURE — 97110 THERAPEUTIC EXERCISES: CPT

## 2022-05-31 PROCEDURE — 97530 THERAPEUTIC ACTIVITIES: CPT

## 2022-05-31 PROCEDURE — 82728 ASSAY OF FERRITIN: CPT | Performed by: STUDENT IN AN ORGANIZED HEALTH CARE EDUCATION/TRAINING PROGRAM

## 2022-05-31 PROCEDURE — C9113 INJ PANTOPRAZOLE SODIUM, VIA: HCPCS | Performed by: STUDENT IN AN ORGANIZED HEALTH CARE EDUCATION/TRAINING PROGRAM

## 2022-05-31 PROCEDURE — 63600175 PHARM REV CODE 636 W HCPCS: Performed by: INTERNAL MEDICINE

## 2022-05-31 PROCEDURE — 85025 COMPLETE CBC W/AUTO DIFF WBC: CPT | Performed by: STUDENT IN AN ORGANIZED HEALTH CARE EDUCATION/TRAINING PROGRAM

## 2022-05-31 PROCEDURE — 20000000 HC ICU ROOM

## 2022-05-31 PROCEDURE — 25000003 PHARM REV CODE 250: Performed by: STUDENT IN AN ORGANIZED HEALTH CARE EDUCATION/TRAINING PROGRAM

## 2022-05-31 PROCEDURE — 63600175 PHARM REV CODE 636 W HCPCS: Performed by: STUDENT IN AN ORGANIZED HEALTH CARE EDUCATION/TRAINING PROGRAM

## 2022-05-31 PROCEDURE — 99900035 HC TECH TIME PER 15 MIN (STAT)

## 2022-05-31 PROCEDURE — 80053 COMPREHEN METABOLIC PANEL: CPT | Performed by: STUDENT IN AN ORGANIZED HEALTH CARE EDUCATION/TRAINING PROGRAM

## 2022-05-31 PROCEDURE — 94761 N-INVAS EAR/PLS OXIMETRY MLT: CPT

## 2022-05-31 RX ORDER — HYDRALAZINE HYDROCHLORIDE 20 MG/ML
10 INJECTION INTRAMUSCULAR; INTRAVENOUS EVERY 6 HOURS PRN
Status: DISCONTINUED | OUTPATIENT
Start: 2022-05-31 | End: 2022-06-07 | Stop reason: HOSPADM

## 2022-05-31 RX ORDER — AMLODIPINE BESYLATE 5 MG/1
10 TABLET ORAL DAILY
Status: DISCONTINUED | OUTPATIENT
Start: 2022-05-31 | End: 2022-06-01

## 2022-05-31 RX ORDER — LISINOPRIL 10 MG/1
20 TABLET ORAL DAILY
Status: DISCONTINUED | OUTPATIENT
Start: 2022-05-31 | End: 2022-06-01

## 2022-05-31 RX ADMIN — POTASSIUM CHLORIDE, DEXTROSE MONOHYDRATE AND SODIUM CHLORIDE: 150; 5; 450 INJECTION, SOLUTION INTRAVENOUS at 09:05

## 2022-05-31 RX ADMIN — NICARDIPINE HYDROCHLORIDE 5 MG/HR: 0.2 INJECTION, SOLUTION INTRAVENOUS at 09:05

## 2022-05-31 RX ADMIN — LEVETIRACETAM 500 MG: 100 INJECTION INTRAVENOUS at 09:05

## 2022-05-31 RX ADMIN — LISINOPRIL 20 MG: 10 TABLET ORAL at 01:05

## 2022-05-31 RX ADMIN — MUPIROCIN: 20 OINTMENT TOPICAL at 09:05

## 2022-05-31 RX ADMIN — POTASSIUM CHLORIDE, DEXTROSE MONOHYDRATE AND SODIUM CHLORIDE: 150; 5; 450 INJECTION, SOLUTION INTRAVENOUS at 06:05

## 2022-05-31 RX ADMIN — AMLODIPINE BESYLATE 10 MG: 5 TABLET ORAL at 01:05

## 2022-05-31 RX ADMIN — INSULIN ASPART 2 UNITS: 100 INJECTION, SOLUTION INTRAVENOUS; SUBCUTANEOUS at 11:05

## 2022-05-31 RX ADMIN — NICARDIPINE HYDROCHLORIDE 5 MG/HR: 0.2 INJECTION, SOLUTION INTRAVENOUS at 04:05

## 2022-05-31 RX ADMIN — MUPIROCIN: 20 OINTMENT TOPICAL at 08:05

## 2022-05-31 RX ADMIN — PANTOPRAZOLE SODIUM 40 MG: 40 INJECTION, POWDER, LYOPHILIZED, FOR SOLUTION INTRAVENOUS at 08:05

## 2022-05-31 RX ADMIN — LEVETIRACETAM 500 MG: 100 INJECTION INTRAVENOUS at 08:05

## 2022-05-31 RX ADMIN — NICARDIPINE HYDROCHLORIDE 5 MG/HR: 0.2 INJECTION, SOLUTION INTRAVENOUS at 12:05

## 2022-05-31 RX ADMIN — INSULIN ASPART 2 UNITS: 100 INJECTION, SOLUTION INTRAVENOUS; SUBCUTANEOUS at 05:05

## 2022-05-31 NOTE — PLAN OF CARE
POC reviewed w/ pt - verbalized understanding but may need reinforcement. Neuro status much better today - oriented x 4 all day, though still w/ delayed responses and drowsiness. Slept majority of the day, but appeared more alert than before when she was awake and did get up w/ PT and sat EOB and stood. VSS throughout day, still on cardene gtt - weaning off d/t PO antihypertensives started today (lisinopril and amlodipine). Room air. Pureed diet started today - tolerating well, good appetite; no BM. Pelaoy remains in place and patent - UO still low; see I&O's. Accucheck's changed to AC/HS today and covered per sliding scale. Mother, Tanya, visited this evening - brought pt's phone, phone , shoes, and glasses; mother expressed wish for pt's kidney issues to be looked further into, as well as possibly psych to see the pt. Will pass long to MD. Pt in locked and low bed, bed alarm on, side rails raised, call light in reach. Safety measures in place.     Problem: Adult Inpatient Plan of Care  Goal: Plan of Care Review  Outcome: Ongoing, Progressing  Goal: Patient-Specific Goal (Individualized)  Outcome: Ongoing, Progressing  Goal: Absence of Hospital-Acquired Illness or Injury  Outcome: Ongoing, Progressing     Problem: Diabetes Comorbidity  Goal: Blood Glucose Level Within Targeted Range  Outcome: Ongoing, Progressing     Problem: Fall Injury Risk  Goal: Absence of Fall and Fall-Related Injury  Outcome: Ongoing, Progressing     Problem: Adjustment to Illness (Delirium)  Goal: Optimal Coping  Outcome: Ongoing, Progressing     Problem: Altered Behavior (Delirium)  Goal: Improved Behavioral Control  Outcome: Ongoing, Progressing     Problem: Seizure, Active Management  Goal: Absence of Seizure/Seizure-Related Injury  Outcome: Ongoing, Progressing

## 2022-05-31 NOTE — PROGRESS NOTES
[facility]  Progress Note  Date: 2022 10:35 AM            Patient Name: Tabby Howard   MRN: [id number]   : 1990    AGE: 32 y.o.    LOS: 3 days Hospital Day: 4  Admit date: 2022  8:17 PM         HPI per EMR: Tabby Howard is a 32 y.o. female with a history of anemia, CKD, type 2 diabetes, gallstones, CKD, chronic diastolic heart failure, and gastroparesis. presents emergency room for evaluation of seizure and hypoglycemia.  Patient has a history of diabetes and gastroparesis.  She reports that she has been taking her insulin but not able to hold any food down due to nausea and vomiting.  She was treated several times in the ER for hypoglycemia using dextrose 50%.  In the ER, patient was reported to have clonic tonic seizure activity which lasted approximately 1 minute.  Patient was given Ativan status post seizure as well as given a Keppra loading dose.     Neurology consult:  Patient was seen examined by me this morning.  She is lethargic, opens eyes to stimulus is able to tell me her name however she does not follow commands or answer any other questions.  She is able to move all extremities symmetrically.     Patient presented to the hospital with severe nausea and vomiting.  In the ER, she was reported to have a generalized tonic-clonic seizure lasting for about a minute.  She did bite her tongue during the episode.  Prior to the episode, her blood sugar was 48. She received a loading dose of Keppra.  She was admitted to the ICU for further care.    2022: No acute events overnight. Patient was seen and examined by me this morning. Neuro exam is improving.  Patient is drowsy however wakes up to stimulus and is oriented to place, year but not to month.  She is able to follow commands.    2022:  No acute events overnight.  No further seizure-like activity.  On exam, patient is drowsy however wakes up to stimulus is able to answer questions and follow commands.      "  Vitals:  Patient Vitals for the past 24 hrs:   BP Temp Temp src Pulse Resp SpO2 Height Weight   05/31/22 1300 (!) 143/71 -- -- 98 18 96 % -- --   05/31/22 1200 (!) 149/89 98.7 °F (37.1 °C) Oral 110 14 95 % -- --   05/31/22 1100 (!) 142/76 -- -- 97 16 (!) 92 % -- --   05/31/22 1000 123/74 -- -- 100 15 (!) 92 % -- --   05/31/22 0900 137/70 -- -- 99 14 (!) 92 % -- --   05/31/22 0800 134/71 98.8 °F (37.1 °C) Oral 99 13 95 % -- --   05/31/22 0700 (!) 146/74 -- -- 102 13 (!) 93 % -- --   05/31/22 0600 (!) 156/78 -- -- 102 17 (!) 93 % -- --   05/31/22 0500 (!) 145/78 -- -- 97 12 95 % -- 75.3 kg (166 lb 0.1 oz)   05/31/22 0400 139/74 98.3 °F (36.8 °C) Oral 95 14 (!) 92 % -- --   05/31/22 0300 (!) 153/79 -- -- 98 14 (!) 94 % -- --   05/31/22 0200 (!) 156/82 -- -- 97 18 99 % -- --   05/31/22 0100 (!) 140/82 -- -- 92 12 99 % -- --   05/31/22 0000 130/74 98.3 °F (36.8 °C) Axillary 93 11 98 % -- --   05/30/22 2300 130/74 -- -- 97 14 98 % -- --   05/30/22 2200 126/76 -- -- 100 14 99 % -- --   05/30/22 2100 122/65 -- -- 97 11 98 % -- --   05/30/22 2000 131/84 98.5 °F (36.9 °C) Axillary 96 14 96 % -- --   05/30/22 1905 131/71 -- -- 94 (!) 0 97 % -- --   05/30/22 1900 -- -- -- 95 (!) 5 99 % -- --   05/30/22 1800 (!) 140/83 -- -- 94 12 98 % -- --   05/30/22 1700 125/73 -- -- 93 10 (!) 94 % -- --   05/30/22 1600 (!) 142/78 98.1 °F (36.7 °C) Oral 91 14 97 % -- --   05/30/22 1500 119/72 -- -- 93 (!) 6 99 % -- --   05/30/22 1436 -- -- -- -- -- -- 5' 2" (1.575 m) 70.9 kg (156 lb 4.9 oz)   05/30/22 1400 111/66 -- -- 94 (!) 5 98 % -- --     PHYSICAL EXAM:     GENERAL APPEARANCE: Well-developed, well-nourished female in no acute distress.  Patient is drowsy however wakes up to stimulus and follows commands.  HEENT: Normocephalic and atraumatic(except bruised/swollen lower lip). PERRL. Oropharynx unremarkable.  PULM: Comfortable on room air.  CV: RRR.  ABDOMEN: Soft, nontender.  EXTREMITIES: No signs of vascular compromise. Pulses present. " No cyanosis, clubbing or edema.  SKIN: Clear; no rashes, lesions or skin breaks in exposed areas.      NEURO:   MENTAL STATUS: Patient drowsy however wakes up to stimulus and oriented to time, place, and person. Affect labile.  CRANIAL NERVES II-XII: Pupils equal, round and reactive to light. Extraocular movements full and intact. No facial asymmetry.  MOTOR: Normal bulk. Tone normal and symmetrical throughout.  No abnormal movements. No tremor.   Strength 5/5 in bilateral upper extremities and 4/5 in bilateral lower extremities.  REFLEXES: DTRs 2+; normal and symmetric throughout.   SENSATION: Sensation grossly intact to fine touch.  COORDINATION:  Could not assess  STATION: Romberg deferred.  GAIT: Deferred.    CURRENT SCHEDULED MEDICATIONS:   levetiracetam IV  500 mg Intravenous Q12H    mupirocin   Nasal BID    pantoprazole  40 mg Intravenous Daily     CURRENT INFUSIONS:   dextrose 5 % and 0.45 % NaCl with KCl 20 mEq 50 mL/hr at 05/31/22 1243    niCARdipine 5 mg/hr (05/31/22 1249)     DATA:  Recent Labs   Lab 05/28/22 2038 05/29/22 0241 05/30/22  0655 05/31/22  0333    141 135* 135*   K 3.5 3.7 3.1* 3.5    105 101 102   CO2 25 24 24 21*   BUN 46* 46* 45* 45*   CREATININE 3.9* 3.7* 3.9* 4.3*   GLU 48* 108 184* 185*   CALCIUM 8.9 8.6* 8.2* 7.8*   PHOS  --  3.9 3.8 4.1   MG  --  2.0 1.9 1.9   AST 27 34 22 21   ALT 23 25 20 20   AMMONIA 25  --   --   --      Recent Labs   Lab 05/28/22 2038 05/29/22 0241 05/30/22  0655 05/31/22  0333   WBC 6.88 10.46 9.07 7.98   HGB 12.2 11.9* 12.2 10.8*   HCT 33.9* 34.4* 34.0* 30.6*   * 107* 140* 131*     No results found for: PROTEINCSF, GLUCCSF, WBCCSF, RBCCSF  Hemoglobin A1C   Date Value Ref Range Status   05/15/2022 5.8 (H) 4.7 - 5.6 % Final   03/06/2022 5.3 4.5 - 5.7 % Final   01/26/2022 6.8 (H) 4.7 - 5.6 % Final   04/24/2021 8.5 (H) 4.0 - 5.6 % Final     Comment:     ADA Screening Guidelines:  5.7-6.4%  Consistent with prediabetes  >or=6.5%   Consistent with diabetes    High levels of fetal hemoglobin interfere with the HbA1C  assay. Heterozygous hemoglobin variants (HbS, HgC, etc)do  not significantly interfere with this assay.   However, presence of multiple variants may affect accuracy.     09/10/2020 11.6 (H) 4.0 - 5.6 % Final     Comment:     ADA Screening Guidelines:  5.7-6.4%  Consistent with prediabetes  >or=6.5%  Consistent with diabetes  High levels of fetal hemoglobin interfere with the HbA1C  assay. Heterozygous hemoglobin variants (HbS, HgC, etc)do  not significantly interfere with this assay.   However, presence of multiple variants may affect accuracy.     02/27/2020 10.1 (H) 4.0 - 5.6 % Final     Comment:     ADA Screening Guidelines:  5.7-6.4%  Consistent with prediabetes  >or=6.5%  Consistent with diabetes  High levels of fetal hemoglobin interfere with the HbA1C  assay. Heterozygous hemoglobin variants (HbS, HgC, etc)do  not significantly interfere with this assay.   However, presence of multiple variants may affect accuracy.              I have personally reviewed and interpreted the pertinent imaging and lab results.  Imaging Results          CT Head Without Contrast (Final result)  Result time 05/28/22 21:10:20    Final result by Gianluca Juarez MD (05/28/22 21:10:20)                 Impression:      Negative CT of the brain for hemorrhage mass or infarction.    Small area of high density posterior to the trapezius muscle.  Correlate for posterior neck skull junction soft tissue hematoma.      Electronically signed by: Gianluca Juarez  Date:    05/28/2022  Time:    21:10             Narrative:    EXAMINATION:  CT HEAD WITHOUT CONTRAST    CLINICAL HISTORY:  Seizure, new-onset, no history of trauma;    TECHNIQUE:  Low dose axial images were obtained through the head.  Coronal and sagittal reformations were also performed. Contrast was not administered.    COMPARISON:  None.    FINDINGS:  There is a 2.2 x 1.1 cm area of high  density in the subcutaneous tissues posterior to the trapezius muscle at the level of the internal occipital protuberance.  The remainder of the scalp and calvarium appear unremarkable.    The brain parenchyma appears normal in attenuation with normal gray-white matter differentiation and no mass effect or pathologic fluid collection.  There is no ventriculomegaly.    Mild mucosal thickening in the left maxillary sinus is present.  No air-fluid levels are seen.  The orbits and orbital contents appear unremarkable.                                        ASSESSMENT AND PLAN:    Posterior reversible encephalopathy syndrome  Seizure  Hypoglycemia  Hypertension     Workup:   CT head:  No acute intracranial abnormality  MRI brain:  Bilateral symmetric FLAIR hyperintensity in occipital lobes.  DWI images did not reveal any infarction.Findings concerning for posterior reversible encephalopathy syndrome  EEG:  Severe encephalopathy without epileptiform activity or seizures     Plan:  · Etiology of seizures likely secondary to PRES and hypoglycemia.  · Given MRI findings of posterior reversible encephalopathy syndrome at high risk of recurrent seizures, patient was started on Keppra maintenance dose of 500 b.i.d..  · Strict Control blood pressure less than 140 systolic.  Patient currently on nicardipine drip.  Titrate to goal blood pressure.  Recommend to start p.o. antihypertensives and wean off nicardipine.  · Seizure precautions.  · Neuro checks per unit protocol.  · Physical and Occupational therapy evaluate and treat.  · Avoid medications that lower seizure threshold.  · Will continue to follow     Seizure education.       No driving for now, no swimming alone, no climbing high areas, no operation of heavy machinery or working with high risk electricity equipment.        Patient and/or next of kin informed.  Follow up Neurology in 2 weeks. Call [phone] to make an appointment.        Thank you kindly for including us  in the care of this patient. Please do not hesitate to contact us with any questions.    Critical Care:  44 minutes of critical care time has been spent evaluating with the patient. Time includes chart review not limited to diagnostic imaging, labs, and vitals, patient assessment, discussion with family and nursing, current order evaluations, and new order entries.      Neptali Marley MD  Neurology/vascular Neurology  Date of Service: 05/31/2022  10:35 AM    Please note: This note was transcribed using voice recognition software. Because of this technology there are often uinintended grammatical, spelling, and other transcription errors. Please disregard these errors.

## 2022-05-31 NOTE — ASSESSMENT & PLAN NOTE
-Monitor in ICU  -Seizure precautions  -PRN IV Ativan  -Cardene infusion for BP control  -Neurology following  -Keppra  -Neurologic checks

## 2022-05-31 NOTE — PLAN OF CARE
Plan of Care/Shift Summary:    Aspiration precautions: patient in upright position during oral intake    Bleeding precautions: monitor for signs of bleeding and excessive bruising     Cardiac Monitoring: monitor and document vital signs per unit protocol; notify physician of significant changes in vital signs    Seizure precautions: safety equipment at bedside; side rails padded    Fall precautions: Bed alarm activated    Safety precautions: Call light and personal belongings within reach, wheels locked and bed in lowest position    Moisture Management: incontinence pad changed as needed; skin to remain clean, dry, and intact    CAUTI precautions: assess need for washington catheter; washington catheter care performed with CHG wipes

## 2022-05-31 NOTE — ASSESSMENT & PLAN NOTE
Induced by hypoglycemia and/or PRES.  -Neurology consulted  -Neuro checks  -Seizure, fall and aspiration precautions  -PRN IV Ativan  -Cardene infusion for BP control  -Keppra

## 2022-05-31 NOTE — SUBJECTIVE & OBJECTIVE
"Interval History: see "Hospital Course"    Review of Systems   Unable to perform ROS: Mental status change   Objective:     Vital Signs (Most Recent):  Temp: 98.3 °F (36.8 °C) (05/31/22 0400)  Pulse: 102 (05/31/22 0700)  Resp: 13 (05/31/22 0700)  BP: (!) 146/74 (05/31/22 0700)  SpO2: (!) 93 % (05/31/22 0700)   Vital Signs (24h Range):  Temp:  [98.1 °F (36.7 °C)-98.5 °F (36.9 °C)] 98.3 °F (36.8 °C)  Pulse:  [] 102  Resp:  [0-21] 13  SpO2:  [92 %-99 %] 93 %  BP: (111-162)/(65-96) 146/74     Weight: 75.3 kg (166 lb 0.1 oz)  Body mass index is 30.36 kg/m².    Intake/Output Summary (Last 24 hours) at 5/31/2022 0823  Last data filed at 5/31/2022 0700  Gross per 24 hour   Intake 1970.66 ml   Output 460 ml   Net 1510.66 ml      Physical Exam  Vitals and nursing note reviewed.   Constitutional:       General: She is not in acute distress.     Appearance: She is well-developed. She is ill-appearing. She is not diaphoretic.   HENT:      Head: Normocephalic and atraumatic.      Right Ear: External ear normal.      Left Ear: External ear normal.      Nose: Nose normal.   Eyes:      General: No scleral icterus.     Conjunctiva/sclera: Conjunctivae normal.   Neck:      Vascular: No JVD.   Cardiovascular:      Rate and Rhythm: Normal rate and regular rhythm.      Pulses: Normal pulses.      Heart sounds: Normal heart sounds. No murmur heard.    No friction rub. No gallop.   Pulmonary:      Effort: Pulmonary effort is normal. No respiratory distress.      Breath sounds: Normal breath sounds. No wheezing or rales.   Abdominal:      General: Bowel sounds are normal. There is no distension.      Palpations: Abdomen is soft.      Tenderness: There is no abdominal tenderness. There is no guarding or rebound.   Musculoskeletal:         General: No tenderness. Normal range of motion.      Cervical back: Neck supple.      Right lower leg: No edema.      Left lower leg: No edema.   Skin:     General: Skin is warm and dry.      " Coloration: Skin is not pale.      Findings: No erythema or rash.   Neurological:      Mental Status: She is disoriented.       Significant Labs: All pertinent labs within the past 24 hours have been reviewed.    Significant Imaging: I have reviewed all pertinent imaging results/findings within the past 24 hours.

## 2022-05-31 NOTE — PT/OT/SLP PROGRESS
Physical Therapy Treatment    Patient Name:  Tabby Howard   MRN:  [id number]    Recommendations:     Discharge Recommendations:  home health PT   Discharge Equipment Recommendations: none   Barriers to discharge: None    Assessment:     Tabby Howard is a 32 y.o. female admitted with a medical diagnosis of PRES (posterior reversible encephalopathy syndrome).  She presents with the following impairments/functional limitations:  weakness, impaired endurance, impaired functional mobilty, gait instability, impaired balance, impaired cognition, decreased lower extremity function, decreased safety awareness, pain, edema, impaired cardiopulmonary response to activity .  Upon arrival PT is drowsy and able to respond to voice. Pt performed 10 reps of thera ex in bed with mod assist from PT including ankle pumps, heel slides, and SLRs. Pt required several verbal cues and encouragement to participate in exercises. Pt was agreeable to sit EOB. Pt performed bed mobility (rolling left, supine to sit) with min assist. She demonstrated fair sitting balance with supervision. She agreed to drink some apple juice while nurse Linette was present. Pt was able to stand and take approx. 4 sides steps with mod assist before returning to bed. Pt was repositioned in bed using mod assist. Pt would benefit from  PT upon discharge.      Rehab Prognosis: Fair; patient would benefit from acute skilled PT services to address these deficits and reach maximum level of function.    Recent Surgery: * No surgery found *      Plan:     During this hospitalization, patient to be seen 6 x/week to address the identified rehab impairments via gait training, therapeutic activities, therapeutic exercises and progress toward the following goals:    · Plan of Care Expires:  06/30/22    Subjective   Pt states that she is hungry and thirsty.   Chief Complaint: none stated   Patient/Family Comments/goals: none stated   Pain/Comfort:  ·        Objective:      Communicated with nurse Sue prior to session.  Patient found HOB elevated with   upon PT entry to room.     General Precautions: Standard, fall, seizure, diabetic   Orthopedic Precautions:N/A   Braces:    Respiratory Status: Room air     Functional Mobility:  · Bed Mobility:     · Rolling Left:  minimum assistance  · Supine to Sit: minimum assistance  · Transfers:     · Sit to Stand:  moderate assistance with no AD      AM-PAC 6 CLICK MOBILITY  Turning over in bed (including adjusting bedclothes, sheets and blankets)?: 3  Sitting down on and standing up from a chair with arms (e.g., wheelchair, bedside commode, etc.): 3  Moving from lying on back to sitting on the side of the bed?: 3  Moving to and from a bed to a chair (including a wheelchair)?: 1  Need to walk in hospital room?: 2  Climbing 3-5 steps with a railing?: 1  Basic Mobility Total Score: 13       Therapeutic Activities and Exercises:   Pt performed 10 reps of thera ex while in bed with mod assist including ankle pumps, SLRs and heel slides.     Patient left HOB elevated with all lines intact, call button in reach and bed alarm on..    GOALS:   Multidisciplinary Problems     Physical Therapy Goals        Problem: Physical Therapy    Goal Priority Disciplines Outcome Goal Variances Interventions   Physical Therapy Goal     PT, PT/OT Ongoing, Progressing     Description: Goals to be met by: 2022     Patient will increase functional independence with mobility by performin. Supine to sit with MInimal Assistance  2. Sit to stand transfer with Minimal Assistance  3. Bed to chair transfer with Minimal Assistance using Rolling Walker  4. Gait  x 250 feet with Minimal Assistance using Rolling Walker.   5. Lower extremity exercise program x20 reps                   Time Tracking:     PT Received On: 22  PT Start Time: 1117     PT Stop Time: 1150  PT Total Time (min): 33 min     Billable Minutes: Therapeutic Activity 10 and Therapeutic  Exercise 22    Treatment Type: Treatment  PT/PTA: PT     PTA Visit Number: 0     05/31/2022

## 2022-05-31 NOTE — CARE UPDATE
05/31/22 0707   Patient Assessment/Suction   Level of Consciousness (AVPU) alert   Respiratory Effort Normal;Unlabored   Expansion/Accessory Muscles/Retractions expansion symmetric;no retractions;no use of accessory muscles   All Lung Fields Breath Sounds diminished;clear   Rhythm/Pattern, Respiratory unlabored   PRE-TX-O2   O2 Device (Oxygen Therapy) room air   Pulse Oximetry Type Continuous   $ Pulse Oximetry - Multiple Charge Pulse Oximetry - Multiple   Aerosol Therapy   $ Aerosol Therapy Charges PRN treatment not required   Respiratory Treatment Status (SVN) PRN treatment not required

## 2022-05-31 NOTE — PLAN OF CARE
Problem: Physical Therapy  Goal: Physical Therapy Goal  Description: Goals to be met by: 2022     Patient will increase functional independence with mobility by performin. Supine to sit with MInimal Assistance  2. Sit to stand transfer with Minimal Assistance  3. Bed to chair transfer with Minimal Assistance using Rolling Walker  4. Gait  x 250 feet with Minimal Assistance using Rolling Walker.   5. Lower extremity exercise program x20 reps  Outcome: Ongoing, Progressing     Pt is able to perform bed mobility (rolling left and supine to sit) with min assist. While seated EOB x 10 min, pt able to maintain fair sitting balance while drinking juice. Pt stood and took approx. 4 side steps with mod assist.

## 2022-05-31 NOTE — ASSESSMENT & PLAN NOTE
Blood sugar well controlled.  -D5-1/2 NS IV fluids  -SSI  -AC HS glucose checks  -Hypoglycemic precautions  -Pureed diet

## 2022-05-31 NOTE — PROGRESS NOTES
[facility] Hospital Medicine  Progress Note    Patient Name: Tabby Howard  MRN: [id number]  Patient Class: IP- Inpatient   Admission Date: 5/28/2022  Length of Stay: 3 days  Attending Physician: Raymundo Parker MD  Primary Care Provider: Primary Doctor No        Subjective:     Principal Problem:PRES (posterior reversible encephalopathy syndrome)        HPI:  Tabby Howard 32-year-old female presents emergency room for evaluation of seizure and hypoglycemia.  Patient has a history of diabetes and gastroparesis.  She reports that she has been taking her insulin but not able to hold any food down due to nausea and vomiting.  She was treated several times in the ER for hypoglycemia using dextrose 50%.  Just before my exam patient was reported to have clonic tonic seizure activity which lasted approximately 1 minute; however her glucose at the time was approximately 120. Previous medical history includes anemia, CKD, type 2 diabetes, gallstones, CKD, chronic diastolic heart failure, and gastroparesis.  ER workup:  CBC with thrombocytopenia of 125.  Original glucose on CMP was 48. BUN 46 and creatinine of 3.9 (baseline).  Urinalysis with 5 red blood cells and rare yeast.  CT the head was negative.  Patient was given Ativan status post seizure as well as given a Keppra loading dose.  Patient be admitted to Hospital Medicine for observation management.  Patient be placed in ICU given continue seizure and hypoglycemia.  Will consult Neurology in a.m..      Overview/Hospital Course:  Tabby Howard is a 32 year old female with a past medical history of IDDM, CKD, gastroparesis, and HFpEF who presented with seizure. She was discovered to be hypoglycemic and was started on a D5 infusion. Neurology was consulted and did not initially start any new medications. Her BP was observed to be elevated as well and she was started on a Cardene infusion. EEG of the brain did not show epileptiform activity, yet MRI of  "the brain showed concern for PRES. Keppra was started per Neurology 5/30. Her blood sugars are stable on D5 - 1/2 NS infusion and her mental status is improving as well, yet not at her baseline.      Interval History: see "Hospital Course"    Review of Systems   Unable to perform ROS: Mental status change   Objective:     Vital Signs (Most Recent):  Temp: 98.3 °F (36.8 °C) (05/31/22 0400)  Pulse: 102 (05/31/22 0700)  Resp: 13 (05/31/22 0700)  BP: (!) 146/74 (05/31/22 0700)  SpO2: (!) 93 % (05/31/22 0700)   Vital Signs (24h Range):  Temp:  [98.1 °F (36.7 °C)-98.5 °F (36.9 °C)] 98.3 °F (36.8 °C)  Pulse:  [] 102  Resp:  [0-21] 13  SpO2:  [92 %-99 %] 93 %  BP: (111-162)/(65-96) 146/74     Weight: 75.3 kg (166 lb 0.1 oz)  Body mass index is 30.36 kg/m².    Intake/Output Summary (Last 24 hours) at 5/31/2022 0823  Last data filed at 5/31/2022 0700  Gross per 24 hour   Intake 1970.66 ml   Output 460 ml   Net 1510.66 ml      Physical Exam  Vitals and nursing note reviewed.   Constitutional:       General: She is not in acute distress.     Appearance: She is well-developed. She is ill-appearing. She is not diaphoretic.   HENT:      Head: Normocephalic and atraumatic.      Right Ear: External ear normal.      Left Ear: External ear normal.      Nose: Nose normal.   Eyes:      General: No scleral icterus.     Conjunctiva/sclera: Conjunctivae normal.   Neck:      Vascular: No JVD.   Cardiovascular:      Rate and Rhythm: Normal rate and regular rhythm.      Pulses: Normal pulses.      Heart sounds: Normal heart sounds. No murmur heard.    No friction rub. No gallop.   Pulmonary:      Effort: Pulmonary effort is normal. No respiratory distress.      Breath sounds: Normal breath sounds. No wheezing or rales.   Abdominal:      General: Bowel sounds are normal. There is no distension.      Palpations: Abdomen is soft.      Tenderness: There is no abdominal tenderness. There is no guarding or rebound.   Musculoskeletal:         " General: No tenderness. Normal range of motion.      Cervical back: Neck supple.      Right lower leg: No edema.      Left lower leg: No edema.   Skin:     General: Skin is warm and dry.      Coloration: Skin is not pale.      Findings: No erythema or rash.   Neurological:      Mental Status: She is disoriented.       Significant Labs: All pertinent labs within the past 24 hours have been reviewed.    Significant Imaging: I have reviewed all pertinent imaging results/findings within the past 24 hours.      Assessment/Plan:      * PRES (posterior reversible encephalopathy syndrome)  -Monitor in ICU  -Seizure precautions  -PRN IV Ativan  -Cardene infusion for BP control  -Neurology following  -Keppra  -Neurologic checks    Seizure  Induced by hypoglycemia and/or PRES.  -Neurology consulted  -Neuro checks  -Seizure, fall and aspiration precautions  -PRN IV Ativan  -Cardene infusion for BP control  -Keppra        Thrombocytopenia  -Trend CBC  -No chemical DVT prophylaxis      Heart failure with preserved ejection fraction  -Telemetry  -Hold home PO medications          Gastroparesis  Chronic.  -Pureed diet  -Hold Reglan as it lowers seizure threshold      CKD (chronic kidney disease), stage IV  Chronic. Stable.  -Renally dose all medications  -Avoid nephrotoxic agents        Anemia  -Trend CBC  -Follow up iron studies      Type II diabetes mellitus  Blood sugar well controlled.  -D5-1/2 NS IV fluids  -SSI  -AC HS glucose checks  -Hypoglycemic precautions  -Pureed diet      VTE Risk Mitigation (From admission, onward)         Ordered     IP VTE LOW RISK PATIENT  Once         05/29/22 0004     Place sequential compression device  Until discontinued         05/29/22 0004     Place JOCELYNE hose  Until discontinued         05/29/22 0004                Discharge Planning   TATIANA:      Code Status: Full Code   Is the patient medically ready for discharge?:     Reason for patient still in hospital (select all that apply): Patient  trending condition, Treatment and Consult recommendations  Discharge Plan A: Home with family            Critical care time spent on the evaluation and treatment of severe organ dysfunction, review of pertinent labs and imaging studies, discussions with consulting providers and discussions with patient/family: 32 minutes.      Raymundo Parker MD  Department of Hospital Medicine   [facility]

## 2022-06-01 PROBLEM — N18.9 ANEMIA OF CHRONIC KIDNEY FAILURE: Status: ACTIVE | Noted: 2021-04-24

## 2022-06-01 PROBLEM — R03.0 ELEVATED BLOOD PRESSURE READING: Status: ACTIVE | Noted: 2022-06-01

## 2022-06-01 PROBLEM — D63.1 ANEMIA OF CHRONIC KIDNEY FAILURE: Status: ACTIVE | Noted: 2021-04-24

## 2022-06-01 LAB
ALBUMIN SERPL BCP-MCNC: 1.9 G/DL (ref 3.5–5.2)
ALP SERPL-CCNC: 62 U/L (ref 55–135)
ALT SERPL W/O P-5'-P-CCNC: 17 U/L (ref 10–44)
ANION GAP SERPL CALC-SCNC: 11 MMOL/L (ref 8–16)
AST SERPL-CCNC: 16 U/L (ref 10–40)
BASOPHILS # BLD AUTO: 0.04 K/UL (ref 0–0.2)
BASOPHILS NFR BLD: 0.5 % (ref 0–1.9)
BILIRUB SERPL-MCNC: 0.6 MG/DL (ref 0.1–1)
BUN SERPL-MCNC: 49 MG/DL (ref 6–20)
CALCIUM SERPL-MCNC: 7.6 MG/DL (ref 8.7–10.5)
CHLORIDE SERPL-SCNC: 104 MMOL/L (ref 95–110)
CO2 SERPL-SCNC: 20 MMOL/L (ref 23–29)
CREAT SERPL-MCNC: 4.9 MG/DL (ref 0.5–1.4)
DIFFERENTIAL METHOD: ABNORMAL
EOSINOPHIL # BLD AUTO: 0.2 K/UL (ref 0–0.5)
EOSINOPHIL NFR BLD: 2.3 % (ref 0–8)
ERYTHROCYTE [DISTWIDTH] IN BLOOD BY AUTOMATED COUNT: 13.5 % (ref 11.5–14.5)
EST. GFR  (AFRICAN AMERICAN): 13 ML/MIN/1.73 M^2
EST. GFR  (NON AFRICAN AMERICAN): 11 ML/MIN/1.73 M^2
GLUCOSE SERPL-MCNC: 227 MG/DL (ref 70–110)
HCT VFR BLD AUTO: 28.5 % (ref 37–48.5)
HGB BLD-MCNC: 9.9 G/DL (ref 12–16)
IMM GRANULOCYTES # BLD AUTO: 0.02 K/UL (ref 0–0.04)
IMM GRANULOCYTES NFR BLD AUTO: 0.2 % (ref 0–0.5)
LYMPHOCYTES # BLD AUTO: 1.4 K/UL (ref 1–4.8)
LYMPHOCYTES NFR BLD: 16.4 % (ref 18–48)
MAGNESIUM SERPL-MCNC: 1.9 MG/DL (ref 1.6–2.6)
MCH RBC QN AUTO: 28.4 PG (ref 27–31)
MCHC RBC AUTO-ENTMCNC: 34.7 G/DL (ref 32–36)
MCV RBC AUTO: 82 FL (ref 82–98)
MONOCYTES # BLD AUTO: 0.9 K/UL (ref 0.3–1)
MONOCYTES NFR BLD: 11.3 % (ref 4–15)
NEUTROPHILS # BLD AUTO: 5.7 K/UL (ref 1.8–7.7)
NEUTROPHILS NFR BLD: 69.3 % (ref 38–73)
NRBC BLD-RTO: 0 /100 WBC
PHOSPHATE SERPL-MCNC: 4.1 MG/DL (ref 2.7–4.5)
PLATELET # BLD AUTO: 130 K/UL (ref 150–450)
PMV BLD AUTO: 9 FL (ref 9.2–12.9)
POCT GLUCOSE: 192 MG/DL (ref 70–110)
POCT GLUCOSE: 204 MG/DL (ref 70–110)
POCT GLUCOSE: 256 MG/DL (ref 70–110)
POCT GLUCOSE: 261 MG/DL (ref 70–110)
POCT GLUCOSE: 325 MG/DL (ref 70–110)
POTASSIUM SERPL-SCNC: 3.8 MMOL/L (ref 3.5–5.1)
PROT SERPL-MCNC: 4.7 G/DL (ref 6–8.4)
RBC # BLD AUTO: 3.49 M/UL (ref 4–5.4)
SODIUM SERPL-SCNC: 135 MMOL/L (ref 136–145)
WBC # BLD AUTO: 8.22 K/UL (ref 3.9–12.7)

## 2022-06-01 PROCEDURE — 36415 COLL VENOUS BLD VENIPUNCTURE: CPT | Performed by: STUDENT IN AN ORGANIZED HEALTH CARE EDUCATION/TRAINING PROGRAM

## 2022-06-01 PROCEDURE — 63600175 PHARM REV CODE 636 W HCPCS: Performed by: STUDENT IN AN ORGANIZED HEALTH CARE EDUCATION/TRAINING PROGRAM

## 2022-06-01 PROCEDURE — 25000003 PHARM REV CODE 250: Performed by: NURSE PRACTITIONER

## 2022-06-01 PROCEDURE — 63600175 PHARM REV CODE 636 W HCPCS: Performed by: INTERNAL MEDICINE

## 2022-06-01 PROCEDURE — 80053 COMPREHEN METABOLIC PANEL: CPT | Performed by: STUDENT IN AN ORGANIZED HEALTH CARE EDUCATION/TRAINING PROGRAM

## 2022-06-01 PROCEDURE — 83735 ASSAY OF MAGNESIUM: CPT | Performed by: STUDENT IN AN ORGANIZED HEALTH CARE EDUCATION/TRAINING PROGRAM

## 2022-06-01 PROCEDURE — 94761 N-INVAS EAR/PLS OXIMETRY MLT: CPT

## 2022-06-01 PROCEDURE — C9113 INJ PANTOPRAZOLE SODIUM, VIA: HCPCS | Performed by: STUDENT IN AN ORGANIZED HEALTH CARE EDUCATION/TRAINING PROGRAM

## 2022-06-01 PROCEDURE — 99900035 HC TECH TIME PER 15 MIN (STAT)

## 2022-06-01 PROCEDURE — 25000003 PHARM REV CODE 250: Performed by: STUDENT IN AN ORGANIZED HEALTH CARE EDUCATION/TRAINING PROGRAM

## 2022-06-01 PROCEDURE — 97530 THERAPEUTIC ACTIVITIES: CPT

## 2022-06-01 PROCEDURE — 20000000 HC ICU ROOM

## 2022-06-01 PROCEDURE — 84100 ASSAY OF PHOSPHORUS: CPT | Performed by: STUDENT IN AN ORGANIZED HEALTH CARE EDUCATION/TRAINING PROGRAM

## 2022-06-01 PROCEDURE — 97110 THERAPEUTIC EXERCISES: CPT

## 2022-06-01 PROCEDURE — 85025 COMPLETE CBC W/AUTO DIFF WBC: CPT | Performed by: STUDENT IN AN ORGANIZED HEALTH CARE EDUCATION/TRAINING PROGRAM

## 2022-06-01 RX ORDER — HYDRALAZINE HYDROCHLORIDE 25 MG/1
100 TABLET, FILM COATED ORAL EVERY 8 HOURS
Status: DISCONTINUED | OUTPATIENT
Start: 2022-06-01 | End: 2022-06-01

## 2022-06-01 RX ORDER — FUROSEMIDE 10 MG/ML
40 INJECTION INTRAMUSCULAR; INTRAVENOUS DAILY
Status: DISCONTINUED | OUTPATIENT
Start: 2022-06-01 | End: 2022-06-02

## 2022-06-01 RX ORDER — DIPHENHYDRAMINE HCL 25 MG
25 CAPSULE ORAL EVERY 6 HOURS PRN
Status: DISCONTINUED | OUTPATIENT
Start: 2022-06-01 | End: 2022-06-07 | Stop reason: HOSPADM

## 2022-06-01 RX ORDER — CLONIDINE HYDROCHLORIDE 0.1 MG/1
0.1 TABLET ORAL 2 TIMES DAILY
Status: DISCONTINUED | OUTPATIENT
Start: 2022-06-01 | End: 2022-06-01

## 2022-06-01 RX ADMIN — ACETAMINOPHEN 1000 MG: 500 TABLET ORAL at 11:06

## 2022-06-01 RX ADMIN — MUPIROCIN: 20 OINTMENT TOPICAL at 09:06

## 2022-06-01 RX ADMIN — INSULIN ASPART 2 UNITS: 100 INJECTION, SOLUTION INTRAVENOUS; SUBCUTANEOUS at 12:06

## 2022-06-01 RX ADMIN — LEVETIRACETAM 500 MG: 100 INJECTION INTRAVENOUS at 08:06

## 2022-06-01 RX ADMIN — NICARDIPINE HYDROCHLORIDE 5 MG/HR: 0.2 INJECTION, SOLUTION INTRAVENOUS at 09:06

## 2022-06-01 RX ADMIN — PANTOPRAZOLE SODIUM 40 MG: 40 INJECTION, POWDER, LYOPHILIZED, FOR SOLUTION INTRAVENOUS at 08:06

## 2022-06-01 RX ADMIN — INSULIN ASPART 3 UNITS: 100 INJECTION, SOLUTION INTRAVENOUS; SUBCUTANEOUS at 05:06

## 2022-06-01 RX ADMIN — LEVETIRACETAM 500 MG: 100 INJECTION INTRAVENOUS at 09:06

## 2022-06-01 RX ADMIN — DIPHENHYDRAMINE HYDROCHLORIDE 25 MG: 25 CAPSULE ORAL at 10:06

## 2022-06-01 RX ADMIN — NICARDIPINE HYDROCHLORIDE 5 MG/HR: 0.2 INJECTION, SOLUTION INTRAVENOUS at 05:06

## 2022-06-01 RX ADMIN — AMLODIPINE BESYLATE 10 MG: 5 TABLET ORAL at 08:06

## 2022-06-01 RX ADMIN — CLONIDINE HYDROCHLORIDE 0.1 MG: 0.1 TABLET ORAL at 09:06

## 2022-06-01 RX ADMIN — FUROSEMIDE 40 MG: 10 INJECTION, SOLUTION INTRAMUSCULAR; INTRAVENOUS at 11:06

## 2022-06-01 RX ADMIN — INSULIN ASPART 1 UNITS: 100 INJECTION, SOLUTION INTRAVENOUS; SUBCUTANEOUS at 10:06

## 2022-06-01 RX ADMIN — LISINOPRIL 20 MG: 10 TABLET ORAL at 08:06

## 2022-06-01 RX ADMIN — NICARDIPINE HYDROCHLORIDE 5 MG/HR: 0.2 INJECTION, SOLUTION INTRAVENOUS at 12:06

## 2022-06-01 RX ADMIN — INSULIN ASPART 1 UNITS: 100 INJECTION, SOLUTION INTRAVENOUS; SUBCUTANEOUS at 05:06

## 2022-06-01 RX ADMIN — MUPIROCIN: 20 OINTMENT TOPICAL at 08:06

## 2022-06-01 NOTE — PT/OT/SLP PROGRESS
Physical Therapy Treatment    Patient Name:  Tabby Howard   MRN:  [id number]    Recommendations:     Discharge Recommendations:  home health PT   Discharge Equipment Recommendations: none   Barriers to discharge: None    Assessment:     Tabby Howard is a 32 y.o. female admitted with a medical diagnosis of PRES (posterior reversible encephalopathy syndrome).  She presents with the following impairments/functional limitations:  weakness, impaired endurance, impaired functional mobilty, gait instability, impaired balance, impaired cognition, decreased safety awareness, pain, edema, impaired cardiopulmonary response to activity.    Upon arrival pt is found in bed with increased drowsiness. She responds to voice, but falls asleep shortly after responses. Pt performed 10 reps of thera ex in supine with max assist including ankle pumps, SLRs, and heel slides. Pt was hesitant to sit EOB due to fatigue and drowsiness. After encouragement from PT, pt agreed to sit EOB. Pt performed rolling and supine to sit with min to mod assist. She required several verbal cues to maintain her attention and to prevent pt from returning to supine. While seated EOB, pt became emotional when speaking with PT about her children. She was able to perform approx. 5 reps of LAQs and ankle pumps while seated EOB. Pt stood with mod to max assist and took approx. 3 side steps before returning back to bed. After completion of treatment, pt complained of increased headache and nurse Mani was notified. Pt unable to sit up in chair on today due to increased drowsiness and high risk for falls. Pt would benefit from HH with PT upon discharge. .    Rehab Prognosis: Fair; patient would benefit from acute skilled PT services to address these deficits and reach maximum level of function.    Recent Surgery: * No surgery found *      Plan:     During this hospitalization, patient to be seen 6 x/week to address the identified rehab impairments via gait  training, therapeutic activities, therapeutic exercises and progress toward the following goals:    · Plan of Care Expires:  06/30/22    Subjective   Pt states that she misses her children.   Chief Complaint: headache   Patient/Family Comments/goals: none stated   Pain/Comfort:  · Pain Rating 1:  (complained of headache)      Objective:     Communicated with nurse Mani prior to session.  Patient found HOB elevated with   upon PT entry to room.     General Precautions: Standard, diabetic, pureed diet, fall, seizure   Orthopedic Precautions:N/A   Braces:    Respiratory Status: Room air     Functional Mobility:  · Bed Mobility:     · Rolling Left:  minimum assistance  · Scooting: minimum assistance and moderate assistance  · Supine to Sit: minimum assistance  · Transfers:     · Sit to Stand:  moderate assistance and maximal assistance with hand-held assist      AM-PAC 6 CLICK MOBILITY  Turning over in bed (including adjusting bedclothes, sheets and blankets)?: 3  Sitting down on and standing up from a chair with arms (e.g., wheelchair, bedside commode, etc.): 2  Moving from lying on back to sitting on the side of the bed?: 3  Moving to and from a bed to a chair (including a wheelchair)?: 2  Need to walk in hospital room?: 2  Climbing 3-5 steps with a railing?: 1  Basic Mobility Total Score: 13       Therapeutic Activities and Exercises:   Pt performed 10 reps of thera ex while supine with max assist including ankle pumps, heel slides and SLRs. While seated EOB, pt performed 5 reps of LAQs and ankle pumps.     Patient left HOB elevated with all lines intact, call button in reach and bed alarm on..    GOALS:   Multidisciplinary Problems     Physical Therapy Goals        Problem: Physical Therapy    Goal Priority Disciplines Outcome Goal Variances Interventions   Physical Therapy Goal     PT, PT/OT Ongoing, Progressing     Description: Goals to be met by: 06-     Patient will increase functional independence with  mobility by performin. Supine to sit with MInimal Assistance  2. Sit to stand transfer with Minimal Assistance  3. Bed to chair transfer with Minimal Assistance using Rolling Walker  4. Gait  x 250 feet with Minimal Assistance using Rolling Walker.   5. Lower extremity exercise program x20 reps                   Time Tracking:     PT Received On: 22  PT Start Time: 1119     PT Stop Time: 1147  PT Total Time (min): 28 min     Billable Minutes: Therapeutic Activity 10 and Therapeutic Exercise 18    Treatment Type: Treatment  PT/PTA: PT     PTA Visit Number: 0     2022

## 2022-06-01 NOTE — ASSESSMENT & PLAN NOTE
Blood sugar well controlled.  -SSI  -AC HS glucose checks  -Hypoglycemic precautions  -Pureed diet

## 2022-06-01 NOTE — ASSESSMENT & PLAN NOTE
Patient likely with undiagnosed HTN contributing to CKD.  -Amlodipine 10  -Hydralazine 100 Q8H  -Clonidine 0.1 BID  -Wean Cardene  -PRN IV Hydralazine

## 2022-06-01 NOTE — ASSESSMENT & PLAN NOTE
Induced by hypoglycemia and/or PRES.  -Neurology consulted  -Neuro checks  -Seizure, fall and aspiration precautions  -PRN IV Ativan  -Cardene infusion for BP control; weaning  -Keppra

## 2022-06-01 NOTE — PLAN OF CARE
POC reviewed with patient. Oriented x4. Drowsy most of day. VSS. Dr Benitez consulted and saw patient. Continue cardene drip for BP control and lasix ivp given per order. Total urine output 250ml this shift. Generalized dependent edema. Especially noted to face. Appetite good. Glucose monitored and covered with sliding scale. Large BM to BSC with stand by assist. Neuro checks continued and seizures precautions maintained. Safety measures in place and bed alarm in use.   Problem: Adult Inpatient Plan of Care  Goal: Plan of Care Review  Outcome: Ongoing, Progressing     Problem: Adult Inpatient Plan of Care  Goal: Absence of Hospital-Acquired Illness or Injury  Outcome: Ongoing, Progressing     Problem: Adult Inpatient Plan of Care  Goal: Optimal Comfort and Wellbeing  Outcome: Ongoing, Progressing     Problem: Diabetes Comorbidity  Goal: Blood Glucose Level Within Targeted Range  Outcome: Ongoing, Progressing     Problem: Skin Injury Risk Increased  Goal: Skin Health and Integrity  Outcome: Ongoing, Progressing

## 2022-06-01 NOTE — PLAN OF CARE
Problem: Physical Therapy  Goal: Physical Therapy Goal  Description: Goals to be met by: 2022     Patient will increase functional independence with mobility by performin. Supine to sit with MInimal Assistance  2. Sit to stand transfer with Minimal Assistance  3. Bed to chair transfer with Minimal Assistance using Rolling Walker  4. Gait  x 250 feet with Minimal Assistance using Rolling Walker.   5. Lower extremity exercise program x20 reps  Outcome: Ongoing, Progressing       Pt unable to sit up in chair due to increased drowsiness. Performed thera ex in supine with max assist. Sat EOB x8 min. Demonstrated ability to perform all bed mobility (rolling left and supine to sit) with min to mod assist.

## 2022-06-01 NOTE — CONSULTS
Nephrology Consult Note        Patient Name: Tabby Howard  MRN: [id number]    Patient Class: IP- Inpatient   Admission Date: 2022  Length of Stay: 4 days  Date of Service: 2022    Attending Physician: Raymundo Parker MD  Primary Care Provider: Primary Doctor No    Reason for Consult: ckd4/acidosis/hyponatremia/anemia/htn/edema    SUBJECTIVE:     HPI: 32F with anemia, CKD, type 2 diabetes, gallstones, CKD 4, chronic diastolic heart failure, gastroparesis admitted on  for seizure and hypoglycemia. She has been taking her insulin but not able to hold any food down due to nausea and vomiting. She was treated several times in the ER for hypoglycemia, but had a witnessed clonic-tonic seizure for about 1 minute. Received Ativan and Keppra. Lisnoprill was topped today after 2 doses, probably over concern for worsening sCr. Amodipine and Clonidine was added in addition to nicardipine gtt. UO is low as documented and she is net positive since admission. Renal is consulted for co-management.    Past Medical History:   Diagnosis Date    CKD (chronic kidney disease), stage IV 2022    Diabetes mellitus due to underlying condition with unspecified complications 2022    Gastroparesis 2022    Heart failure with preserved ejection fraction 2022    EF 55% on 3/22    History of gastroesophageal reflux (GERD)     History of supraventricular tachycardia     Sickle cell trait 2022    Type 2 diabetes mellitus      Past Surgical History:   Procedure Laterality Date     SECTION      x 3    ESOPHAGOGASTRODUODENOSCOPY N/A 10/18/2019    Procedure: ESOPHAGOGASTRODUODENOSCOPY (EGD);  Surgeon: Gianluca Mendez MD;  Location: [facility];  Service: Endoscopy;  Laterality: N/A;     Family History   Problem Relation Age of Onset    Diabetes Mother     Diabetes Father      Social History     Tobacco Use    Smoking status: Never Smoker    Smokeless tobacco: Never Used   Substance Use Topics     Alcohol use: No    Drug use: No       Review of patient's allergies indicates:   Allergen Reactions    Penicillins Hives       Outpatient meds:  No current facility-administered medications on file prior to encounter.     Current Outpatient Medications on File Prior to Encounter   Medication Sig Dispense Refill    acetaminophen (TYLENOL) 325 MG tablet Take 2 tablets (650 mg total) by mouth every 6 (six) hours as needed for Pain or Temperature greater than (100.3). 30 tablet 0    blood-glucose meter kit Use as instructed 1 each 0    cephALEXin (KEFLEX) 500 MG capsule Take 500 mg by mouth 4 (four) times daily.      doxycycline (VIBRAMYCIN) 100 MG Cap Take 100 mg by mouth 2 (two) times daily.      ibuprofen (ADVIL,MOTRIN) 600 MG tablet Take 1 tablet (600 mg total) by mouth every 6 (six) hours as needed for Pain. 20 tablet 0    insulin (BASAGLAR KWIKPEN U-100 INSULIN) glargine 100 units/mL (3mL) SubQ pen Inject 28 Units into the skin every evening. 25.2 mL 3    insulin glulisine U-100 (APIDRA U-100 INSULIN) 100 unit/mL injection Inject 8 Units into the skin 3 (three) times daily before meals. 21.6 mL 3    metoclopramide HCl (REGLAN) 10 MG tablet Take 1 tablet (10 mg total) by mouth every 6 (six) hours as needed (headache, nausea or vomiting). 30 tablet 0    ondansetron (ZOFRAN) 4 MG tablet Take 1 tablet (4 mg total) by mouth every 6 (six) hours as needed for Nausea. 30 tablet 0    ondansetron (ZOFRAN-ODT) 4 MG TbDL Take 1 tablet (4 mg total) by mouth every 6 (six) hours as needed (nausea or vomiting). 12 tablet 0    oxyCODONE (ROXICODONE) 5 MG immediate release tablet Take 1 tablet (5 mg total) by mouth every 6 (six) hours as needed for Pain. 20 tablet 0    pantoprazole (PROTONIX) 40 MG tablet Take 1 tablet (40 mg total) by mouth once daily. 30 tablet 3    polyethylene glycol (GLYCOLAX) 17 gram PwPk Take 17 g by mouth once daily. 30 each 1    promethazine (PHENERGAN) 25 MG suppository Place 1 suppository  (25 mg total) rectally every 6 (six) hours as needed for Nausea (For severe nausea and vomiting not improved with oral medications). 10 suppository 0    senna-docusate 8.6-50 mg (SENNA WITH DOCUSATE SODIUM) 8.6-50 mg per tablet Take 1 tablet by mouth daily as needed for Constipation. 30 tablet 1       Scheduled meds:   amLODIPine  10 mg Oral Daily    cloNIDine  0.1 mg Oral BID    hydrALAZINE  100 mg Oral Q8H    levetiracetam IV  500 mg Intravenous Q12H    mupirocin   Nasal BID    pantoprazole  40 mg Intravenous Daily       Infusions:   niCARdipine 6 mg/hr (06/01/22 0940)       PRN meds:  acetaminophen, albuterol-ipratropium, aluminum-magnesium hydroxide-simethicone, dextrose 10%, dextrose 10%, diphenhydrAMINE, EPINEPHrine, famotidine (PF), glucagon (human recombinant), glucose, glucose, hydrALAZINE, hydrocortisone sodium succinate, insulin aspart U-100, magnesium oxide, magnesium oxide, melatonin, naloxone, ondansetron, potassium bicarbonate, potassium bicarbonate, potassium bicarbonate, potassium, sodium phosphates, potassium, sodium phosphates, potassium, sodium phosphates, simethicone, sodium chloride 0.9%    Review of Systems:  Review of Systems   Constitutional: Negative for chills, fever, malaise/fatigue and weight loss.   HENT: Negative for hearing loss and nosebleeds.    Eyes: Negative for blurred vision, double vision and photophobia.   Respiratory: Negative for cough, shortness of breath and wheezing.    Cardiovascular: Negative for chest pain, palpitations and leg swelling.   Gastrointestinal: Negative for abdominal pain, constipation, diarrhea, heartburn, nausea and vomiting.   Genitourinary: Negative for dysuria, frequency and urgency.   Musculoskeletal: Negative for falls, joint pain and myalgias.   Skin: Negative for itching and rash.   Neurological: Negative for dizziness, speech change, focal weakness, loss of consciousness and headaches.   Endo/Heme/Allergies: Does not bruise/bleed easily.    Psychiatric/Behavioral: Negative for depression and substance abuse. The patient is not nervous/anxious.        OBJECTIVE:     Vital Signs and IO (Last 24H):  Temp:  [98 °F (36.7 °C)-98.8 °F (37.1 °C)]   Pulse:  []   Resp:  [10-27]   BP: (123-171)/(62-95)   SpO2:  [92 %-98 %]   I/O last 3 completed shifts:  In: 2649.8 [P.O.:120; I.V.:2529.8]  Out: 520 [Urine:520]    Wt Readings from Last 5 Encounters:   06/01/22 75.1 kg (165 lb 9.1 oz)   05/06/22 70.3 kg (155 lb)   04/11/22 65.8 kg (145 lb)   12/20/21 59.9 kg (132 lb)   05/04/21 65.6 kg (144 lb 11.7 oz)     Physical Exam:  Physical Exam  Constitutional:       Appearance: She is well-developed. She is not diaphoretic.   HENT:      Head: Normocephalic and atraumatic.   Eyes:      General: No scleral icterus.     Pupils: Pupils are equal, round, and reactive to light.   Cardiovascular:      Rate and Rhythm: Normal rate and regular rhythm.   Pulmonary:      Effort: Pulmonary effort is normal. No respiratory distress.      Breath sounds: No stridor.   Abdominal:      General: There is no distension.      Palpations: Abdomen is soft.   Musculoskeletal:         General: Swelling present. No deformity. Normal range of motion.      Cervical back: Neck supple.   Skin:     General: Skin is warm and dry.      Findings: No erythema or rash.   Neurological:      Mental Status: She is alert and oriented to person, place, and time.      Cranial Nerves: No cranial nerve deficit.   Psychiatric:         Behavior: Behavior normal.         Body mass index is 30.28 kg/m².    Laboratory:  Recent Labs   Lab 05/30/22  0655 05/31/22  0333 06/01/22  0322   * 135* 135*   K 3.1* 3.5 3.8    102 104   CO2 24 21* 20*   BUN 45* 45* 49*   CREATININE 3.9* 4.3* 4.9*   ESTGFRAFRICA 17* 15* 13*   EGFRNONAA 14* 13* 11*   * 185* 227*       Recent Labs   Lab 05/30/22  0655 05/31/22  0333 06/01/22  0322   CALCIUM 8.2* 7.8* 7.6*   ALBUMIN 2.0* 1.9* 1.9*   PHOS 3.8 4.1 4.1   MG 1.9  1.9 1.9             Recent Labs   Lab 05/29/22  2331 05/30/22  0557 05/30/22  1203 05/30/22  1809 05/30/22  2054 05/31/22  1140 05/31/22  1649 05/31/22  2140 05/31/22  2141 06/01/22  0041   POCTGLUCOSE 203* 209* 212* 188* 201* 233* 280* 405* 372* 325*       Recent Labs   Lab 01/26/22  0240 03/06/22  1135 05/15/22  1954   Hemoglobin A1C 6.8 H 5.3 5.8 H       Recent Labs   Lab 05/30/22  0655 05/31/22  0333 06/01/22  0322   WBC 9.07 7.98 8.22   HGB 12.2 10.8* 9.9*   HCT 34.0* 30.6* 28.5*   * 131* 130*   MCV 82 81* 82   MCHC 35.9 35.3 34.7   MONO 7.9  0.7 9.6  0.8 11.3  0.9       Recent Labs   Lab 05/30/22  0655 05/31/22  0333 06/01/22  0322   BILITOT 0.9 0.7 0.6   PROT 4.9* 4.8* 4.7*   ALBUMIN 2.0* 1.9* 1.9*   ALKPHOS 67 66 62   ALT 20 20 17   AST 22 21 16       Recent Labs   Lab 09/10/20  0355 09/10/20  0355 12/13/20  1702 04/23/21 2006 04/24/21  1259 05/28/22 2036   Color, UA Yellow  --  Red A Yellow Yellow Yellow   Appearance, UA Clear  --  Cloudy A Clear Clear Clear   pH, UA 8.0  --  6.0 6.0 6.0 7.0   Specific Gravity, UA 1.010  --  1.010 1.020 1.010 1.020   Protein, UA Negative  --  2+ A 3+ A 3+ A 3+ A   Glucose, UA 3+ A  --  3+ A 3+ A 3+ A 2+ A   Ketones, UA Trace A  --  1+ A 1+ A Trace A Negative   Urobilinogen, UA 1.0  --  1.0  --   --  Negative   Bilirubin (UA) Negative  --  Negative Negative Negative Negative   Occult Blood UA 1+ A  --  3+ A 2+ A 1+ A 2+ A   Nitrite, UA Negative  --  Positive A Negative Negative Negative   RBC, UA 2  --  >100 H 54 H 2 5 H   WBC, UA 12 H  --  2 44 H 5 1   Bacteria Many A  --  Few A Occasional Rare None   Hyaline Casts, UA  --    < > 0 0 0 0    < > = values in this interval not displayed.       Recent Labs   Lab 12/13/20  1042 04/23/21 1952 04/23/21 2006 04/11/22 2043 04/11/22  2345 04/12/22  0211   POC PH 7.457 H 7.458 H  --  7.365  --   --    POC PCO2 32.6 L 32.9 L  --  39.5  --   --    POC HCO3 23.0 L 23.3 L  --  22.6 L  --   --    POC PO2 26 LL 24 LL  --  25  L  --   --    POC SATURATED O2 53 L 47 L  --  42 L  --   --    POC BE -1 -1  --  -3  --   --    Sample VENOUS VENOUS   < > VENOUS VENOUS VENOUS    < > = values in this interval not displayed.       Microbiology Results (last 7 days)     ** No results found for the last 168 hours. **          ASSESSMENT/PLAN:     CKD stage 4  Edema  Hyponatremia  Acidosis  Uncontrolled DM  No NSAIDs, ACEI/ARB, IV contrast or other nephrotoxins.  Keep MAP > 60, SBP > 100.  Dose meds for GFR < 30 ml/min.  Renal diet - low K, low phos.  Hold oral BP meds, add diuretic IV and titrate as tolerated, keep washington with close IO.  Control DM.    Anemia of CKD  Hgb and HCT are acceptable. Monitor.  Will provide SHELLEY and/or IV iron PRN.    HTN  BP seem controlled.   Tolerate asymptomatic HTN up to -160.  Stop all oral meds, keep Nicardipine, add diuretic to deal with edema, escalate as needed, keep washington.    Thank you for allowing us to participate in the care of your patient!   We will follow the patient and provide recommendations as needed.    Patient care time was spent personally by me on the following activities:   · Obtaining a history.  · Examination of patient.  · Providing medical care at the patients bedside.  · Developing a treatment plan with patient or surrogate and bedside caregivers.  · Ordering and reviewing laboratory studies, radiographic studies, pulse oximetry.  · Ordering and performing treatments and interventions.  · Evaluation of patient's response to treatment.  · Discussions with consultants while on the unit and immediately available to the patient.  · Re-evaluation of the patient's condition.  · Documentation in the medical record.     Mando Benitez MD    [facility]  [address] [place] [address]  JEANMARIE Connolly [address]    [phone] - tel  [phone] - fax    6/1/2022

## 2022-06-01 NOTE — PROGRESS NOTES
[facility] Hospital Medicine  Progress Note    Patient Name: Tabby Howard  MRN: [id number]  Patient Class: IP- Inpatient   Admission Date: 5/28/2022  Length of Stay: 4 days  Attending Physician: Raymundo Parker MD  Primary Care Provider: Primary Doctor No        Subjective:     Principal Problem:PRES (posterior reversible encephalopathy syndrome)        HPI:  Tabby Howard 32-year-old female presents emergency room for evaluation of seizure and hypoglycemia.  Patient has a history of diabetes and gastroparesis.  She reports that she has been taking her insulin but not able to hold any food down due to nausea and vomiting.  She was treated several times in the ER for hypoglycemia using dextrose 50%.  Just before my exam patient was reported to have clonic tonic seizure activity which lasted approximately 1 minute; however her glucose at the time was approximately 120. Previous medical history includes anemia, CKD, type 2 diabetes, gallstones, CKD, chronic diastolic heart failure, and gastroparesis.  ER workup:  CBC with thrombocytopenia of 125.  Original glucose on CMP was 48. BUN 46 and creatinine of 3.9 (baseline).  Urinalysis with 5 red blood cells and rare yeast.  CT the head was negative.  Patient was given Ativan status post seizure as well as given a Keppra loading dose.  Patient be admitted to Hospital Medicine for observation management.  Patient be placed in ICU given continue seizure and hypoglycemia.  Will consult Neurology in a.m..      Overview/Hospital Course:  Tabby Howard is a 32 year old female with a past medical history of IDDM, CKD, gastroparesis, and HFpEF who presented with seizure. She was discovered to be hypoglycemic and was started on a D5 infusion (since discontinued). Neurology was consulted and did not initially start any new medications. Her BP was observed to be elevated as well and she was started on a Cardene infusion. EEG of the brain did not show epileptiform  "activity, yet MRI of the brain showed concern for PRES. Keppra was started per Neurology 5/30. Her mental status is improving, yet she is not at her baseline. Cardene is being weaned as PO BP medications are being titrated. Nephrology was consulted 6/1 given the patient's progressive CKD.       Interval History: see "Hospital Course"    Review of Systems   Unable to perform ROS: Mental status change   Objective:     Vital Signs (Most Recent):  Temp: 98.2 °F (36.8 °C) (06/01/22 0800)  Pulse: 95 (06/01/22 0800)  Resp: 12 (06/01/22 0815)  BP: 136/68 (06/01/22 0800)  SpO2: 95 % (06/01/22 0800) Vital Signs (24h Range):  Temp:  [98 °F (36.7 °C)-98.8 °F (37.1 °C)] 98.2 °F (36.8 °C)  Pulse:  [] 95  Resp:  [10-27] 12  SpO2:  [92 %-98 %] 95 %  BP: (123-171)/(62-95) 136/68     Weight: 75.1 kg (165 lb 9.1 oz)  Body mass index is 30.28 kg/m².    Intake/Output Summary (Last 24 hours) at 6/1/2022 0854  Last data filed at 6/1/2022 0846  Gross per 24 hour   Intake 2129.58 ml   Output 400 ml   Net 1729.58 ml      Physical Exam  Vitals and nursing note reviewed.   Constitutional:       General: She is not in acute distress.     Appearance: She is well-developed. She is ill-appearing. She is not diaphoretic.      Comments: Anasarca.   HENT:      Head: Normocephalic and atraumatic.      Right Ear: External ear normal.      Left Ear: External ear normal.      Nose: Nose normal.   Eyes:      General: No scleral icterus.     Conjunctiva/sclera: Conjunctivae normal.   Neck:      Vascular: No JVD.   Cardiovascular:      Rate and Rhythm: Normal rate and regular rhythm.      Pulses: Normal pulses.      Heart sounds: Normal heart sounds. No murmur heard.    No friction rub. No gallop.   Pulmonary:      Effort: Pulmonary effort is normal. No respiratory distress.      Breath sounds: Normal breath sounds. No wheezing or rales.   Abdominal:      General: Bowel sounds are normal. There is no distension.      Palpations: Abdomen is soft.      " Tenderness: There is no abdominal tenderness. There is no guarding or rebound.   Musculoskeletal:         General: No tenderness. Normal range of motion.      Cervical back: Neck supple.      Right lower leg: No edema.      Left lower leg: No edema.   Skin:     General: Skin is warm and dry.      Coloration: Skin is not pale.      Findings: No erythema or rash.   Neurological:      Mental Status: She is disoriented.       Significant Labs: All pertinent labs within the past 24 hours have been reviewed.    Significant Imaging: I have reviewed all pertinent imaging results/findings within the past 24 hours.      Assessment/Plan:      * PRES (posterior reversible encephalopathy syndrome)  -Monitor in ICU  -Seizure precautions  -PRN IV Ativan  -Cardene infusion for BP control; currently weaning  -Neurology following  -Keppra  -Neurologic checks    Seizure  Induced by hypoglycemia and/or PRES.  -Neurology consulted  -Neuro checks  -Seizure, fall and aspiration precautions  -PRN IV Ativan  -Cardene infusion for BP control; weaning  -Keppra        Elevated blood pressure reading  Patient likely with undiagnosed HTN contributing to CKD.  -Amlodipine 10  -Hydralazine 100 Q8H  -Clonidine 0.1 BID  -Wean Cardene  -PRN IV Hydralazine      Thrombocytopenia  -Trend CBC  -No chemical DVT prophylaxis      Heart failure with preserved ejection fraction  -Telemetry  -Hold home PO medications          Gastroparesis  Chronic.  -Pureed diet  -Hold Reglan as it lowers seizure threshold      CKD (chronic kidney disease), stage IV  Chronic. Stable.  -Renally dose all medications  -Avoid nephrotoxic agents  -Nephrology consulted        Anemia of chronic kidney failure  -Trend CBC      Type II diabetes mellitus  Blood sugar well controlled.  -SSI  -AC HS glucose checks  -Hypoglycemic precautions  -Pureed diet      VTE Risk Mitigation (From admission, onward)         Ordered     IP VTE LOW RISK PATIENT  Once         05/29/22 [address] [place] [address]  sequential compression device  Until discontinued         05/29/22 0004     Place JOCELYNE hose  Until discontinued         05/29/22 0004                Discharge Planning   TATIANA: 6/3/2022     Code Status: Full Code   Is the patient medically ready for discharge?:     Reason for patient still in hospital (select all that apply): Patient trending condition, Treatment and Consult recommendations  Discharge Plan A: Home with family            Critical care time spent on the evaluation and treatment of severe organ dysfunction, review of pertinent labs and imaging studies, discussions with consulting providers and discussions with patient/family: 32 minutes.      Raymundo Parker MD  Department of Hospital Medicine   [facility]

## 2022-06-01 NOTE — ASSESSMENT & PLAN NOTE
Chronic. Stable.  -Renally dose all medications  -Avoid nephrotoxic agents  -Nephrology consulted

## 2022-06-01 NOTE — CARE UPDATE
06/01/22 0713   Patient Assessment/Suction   Level of Consciousness (AVPU)   (pt asleep)   Respiratory Effort Normal;Unlabored   Expansion/Accessory Muscles/Retractions expansion symmetric;no retractions;no use of accessory muscles   All Lung Fields Breath Sounds diminished;clear   Rhythm/Pattern, Respiratory unlabored   PRE-TX-O2   O2 Device (Oxygen Therapy) room air   Pulse Oximetry Type Continuous   $ Pulse Oximetry - Multiple Charge Pulse Oximetry - Multiple   Aerosol Therapy   $ Aerosol Therapy Charges PRN treatment not required   Respiratory Treatment Status (SVN) PRN treatment not required

## 2022-06-01 NOTE — ASSESSMENT & PLAN NOTE
-Monitor in ICU  -Seizure precautions  -PRN IV Ativan  -Cardene infusion for BP control; currently weaning  -Neurology following  -Keppra  -Neurologic checks

## 2022-06-01 NOTE — PLAN OF CARE
Intervention:carbohydrate modified diet      Recommendations  Recommendation/Intervention:   1.) Consider adding consistent carb restriction to Renal diet   Goals: 1.) diet will advance within 48 hrs. 2.) pt will meet >50% of EEN   Nutrition Goal Status: 1.) goal met 2.) new  Communication of RD Recs: other (comment) (POC)

## 2022-06-01 NOTE — PROGRESS NOTES
[facility]  Progress Note  Date: 2022 10:35 AM            Patient Name: Tabby Howard   MRN: [id number]   : 1990    AGE: 32 y.o.    LOS: 4 days Hospital Day: 5  Admit date: 2022  8:17 PM         HPI per EMR: Tabby Howard is a 32 y.o. female with a history of anemia, CKD, type 2 diabetes, gallstones, CKD, chronic diastolic heart failure, and gastroparesis. presents emergency room for evaluation of seizure and hypoglycemia.  Patient has a history of diabetes and gastroparesis.  She reports that she has been taking her insulin but not able to hold any food down due to nausea and vomiting.  She was treated several times in the ER for hypoglycemia using dextrose 50%.  In the ER, patient was reported to have clonic tonic seizure activity which lasted approximately 1 minute.  Patient was given Ativan status post seizure as well as given a Keppra loading dose.     Neurology consult:  Patient was seen examined by me this morning.  She is lethargic, opens eyes to stimulus is able to tell me her name however she does not follow commands or answer any other questions.  She is able to move all extremities symmetrically.     Patient presented to the hospital with severe nausea and vomiting.  In the ER, she was reported to have a generalized tonic-clonic seizure lasting for about a minute.  She did bite her tongue during the episode.  Prior to the episode, her blood sugar was 48. She received a loading dose of Keppra.  She was admitted to the ICU for further care.    2022: No acute events overnight. Patient was seen and examined by me this morning. Neuro exam is improving.  Patient is drowsy however wakes up to stimulus and is oriented to place, year but not to month.  She is able to follow commands.    2022:  No acute events overnight.  No further seizure-like activity.  On exam, patient is drowsy however wakes up to stimulus is able to answer questions and follow  commands.    06/01/2022:  Patient was seen examined by me this morning.  No acute events overnight.  No further seizure-like activity.  She remains to be drowsy but wakes up, she is oriented to time place and person and is able to follow commands.       Vitals:  Patient Vitals for the past 24 hrs:   BP Temp Temp src Pulse Resp SpO2 Weight   06/01/22 1145 129/80 -- -- 97 (!) 23 98 % --   06/01/22 1130 -- 98.1 °F (36.7 °C) Axillary -- -- -- --   06/01/22 1100 (!) 144/70 -- -- 101 19 (!) 94 % --   06/01/22 1030 (!) 147/77 -- -- 100 14 95 % --   06/01/22 1000 (!) 147/77 -- -- 103 17 98 % --   06/01/22 0930 (!) 153/77 -- -- 102 13 (!) 94 % --   06/01/22 0924 (!) 145/78 -- -- -- -- -- --   06/01/22 0900 (!) 145/78 -- -- 98 13 96 % --   06/01/22 0830 139/74 -- -- 95 14 97 % --   06/01/22 0815 -- -- -- -- 12 -- --   06/01/22 0800 136/68 98.2 °F (36.8 °C) Oral 95 12 95 % --   06/01/22 0745 -- -- -- 96 12 (!) 94 % --   06/01/22 0730 138/69 -- -- 97 10 (!) 94 % --   06/01/22 0715 -- -- -- 98 19 (!) 94 % --   06/01/22 0713 -- -- -- 98 17 (!) 94 % --   06/01/22 0700 137/71 -- -- 95 12 96 % --   06/01/22 0600 (!) 149/80 -- -- 101 18 96 % --   06/01/22 0530 129/70 -- -- 94 13 95 % 75.1 kg (165 lb 9.1 oz)   06/01/22 0500 129/66 -- -- 95 18 (!) 94 % --   06/01/22 0430 136/68 -- -- 95 15 (!) 93 % --   06/01/22 0400 137/67 98.2 °F (36.8 °C) Oral 94 15 (!) 93 % --   06/01/22 0330 138/62 -- -- 95 18 (!) 92 % --   06/01/22 0300 (!) 142/71 -- -- 97 14 (!) 93 % --   06/01/22 0200 (!) 154/84 -- -- 103 19 96 % --   06/01/22 0130 (!) 154/85 -- -- 100 18 (!) 94 % --   06/01/22 0100 129/82 -- -- 94 (!) 22 (!) 94 % --   06/01/22 0030 (!) 142/69 -- -- 94 18 95 % --   06/01/22 0000 138/69 98 °F (36.7 °C) Oral 94 16 (!) 94 % --   05/31/22 2330 139/69 -- -- 96 19 95 % --   05/31/22 2300 139/71 -- -- 100 19 (!) 93 % --   05/31/22 2230 (!) 146/70 -- -- 100 18 (!) 94 % --   05/31/22 2200 (!) 152/74 -- -- 103 (!) 24 (!) 94 % --   05/31/22 2150 (!)  158/72 -- -- 104 20 98 % --   05/31/22 2135 (!) 171/95 -- -- 101 15 98 % --   05/31/22 2130 (!) 156/92 -- -- 100 18 95 % --   05/31/22 2100 (!) 158/72 -- -- 97 (!) 27 96 % --   05/31/22 2030 (!) 165/91 -- -- 103 (!) 25 97 % --   05/31/22 2000 (!) 146/86 98.5 °F (36.9 °C) Oral 100 19 (!) 93 % --   05/31/22 1930 (!) 143/65 -- -- 100 (!) 21 (!) 94 % --   05/31/22 1927 -- -- -- 98 20 (!) 94 % --   05/31/22 1900 (!) 143/78 -- -- 98 19 (!) 94 % --   05/31/22 1800 (!) 149/79 -- -- 99 20 98 % --   05/31/22 1700 (!) 143/79 -- -- 97 15 95 % --   05/31/22 1600 139/76 98.8 °F (37.1 °C) Oral 94 17 95 % --   05/31/22 1500 136/78 -- -- 93 15 (!) 94 % --   05/31/22 1400 (!) 150/70 -- -- 95 14 (!) 92 % --     PHYSICAL EXAM:     GENERAL APPEARANCE: Well-developed, well-nourished female in no acute distress.  Patient is drowsy however wakes up to stimulus and follows commands.  HEENT: Normocephalic and atraumatic(except bruised/swollen lower lip). PERRL. Oropharynx unremarkable.  PULM: Comfortable on room air.  CV: RRR.  ABDOMEN: Soft, nontender.  EXTREMITIES: No signs of vascular compromise. Pulses present. No cyanosis, clubbing or edema.  SKIN: Clear; no rashes, lesions or skin breaks in exposed areas.      NEURO:   MENTAL STATUS: Patient drowsy however wakes up to stimulus and oriented to time, place, and person. Affect labile.  CRANIAL NERVES II-XII: Pupils equal, round and reactive to light. Extraocular movements full and intact. No facial asymmetry.  MOTOR: Normal bulk. Tone normal and symmetrical throughout.  No abnormal movements. No tremor.   Strength 5/5 in bilateral upper extremities and 4/5 in bilateral lower extremities.  REFLEXES: DTRs 2+; normal and symmetric throughout.   SENSATION: Sensation grossly intact to fine touch.  COORDINATION:  Could not assess  STATION: [place] deferred.  GAIT: Deferred.    CURRENT SCHEDULED MEDICATIONS:   furosemide (LASIX) injection  40 mg Intravenous Daily    levetiracetam IV  500 mg  Intravenous Q12H    mupirocin   Nasal BID    pantoprazole  40 mg Intravenous Daily     CURRENT INFUSIONS:   niCARdipine 5 mg/hr (06/01/22 1250)     DATA:  Recent Labs   Lab 05/28/22 2038 05/29/22 0241 05/30/22 0655 05/31/22 0333 06/01/22  0322    141 135* 135* 135*   K 3.5 3.7 3.1* 3.5 3.8    105 101 102 104   CO2 25 24 24 21* 20*   BUN 46* 46* 45* 45* 49*   CREATININE 3.9* 3.7* 3.9* 4.3* 4.9*   GLU 48* 108 184* 185* 227*   CALCIUM 8.9 8.6* 8.2* 7.8* 7.6*   PHOS  --  3.9 3.8 4.1 4.1   MG  --  2.0 1.9 1.9 1.9   AST 27 34 22 21 16   ALT 23 25 20 20 17   AMMONIA 25  --   --   --   --      Recent Labs   Lab 05/28/22 2038 05/29/22 0241 05/30/22 0655 05/31/22 0333 06/01/22  0322   WBC 6.88 10.46 9.07 7.98 8.22   HGB 12.2 11.9* 12.2 10.8* 9.9*   HCT 33.9* 34.4* 34.0* 30.6* 28.5*   * 107* 140* 131* 130*     No results found for: PROTEINCSF, GLUCCSF, WBCCSF, RBCCSF  Hemoglobin A1C   Date Value Ref Range Status   05/15/2022 5.8 (H) 4.7 - 5.6 % Final   03/06/2022 5.3 4.5 - 5.7 % Final   01/26/2022 6.8 (H) 4.7 - 5.6 % Final   04/24/2021 8.5 (H) 4.0 - 5.6 % Final     Comment:     ADA Screening Guidelines:  5.7-6.4%  Consistent with prediabetes  >or=6.5%  Consistent with diabetes    High levels of fetal hemoglobin interfere with the HbA1C  assay. Heterozygous hemoglobin variants (HbS, HgC, etc)do  not significantly interfere with this assay.   However, presence of multiple variants may affect accuracy.     09/10/2020 11.6 (H) 4.0 - 5.6 % Final     Comment:     ADA Screening Guidelines:  5.7-6.4%  Consistent with prediabetes  >or=6.5%  Consistent with diabetes  High levels of fetal hemoglobin interfere with the HbA1C  assay. Heterozygous hemoglobin variants (HbS, HgC, etc)do  not significantly interfere with this assay.   However, presence of multiple variants may affect accuracy.     02/27/2020 10.1 (H) 4.0 - 5.6 % Final     Comment:     ADA Screening Guidelines:  5.7-6.4%  Consistent with  prediabetes  >or=6.5%  Consistent with diabetes  High levels of fetal hemoglobin interfere with the HbA1C  assay. Heterozygous hemoglobin variants (HbS, HgC, etc)do  not significantly interfere with this assay.   However, presence of multiple variants may affect accuracy.              I have personally reviewed and interpreted the pertinent imaging and lab results.  Imaging Results          CT Head Without Contrast (Final result)  Result time 05/28/22 21:10:20    Final result by Gianluca Juarez MD (05/28/22 21:10:20)                 Impression:      Negative CT of the brain for hemorrhage mass or infarction.    Small area of high density posterior to the trapezius muscle.  Correlate for posterior neck skull junction soft tissue hematoma.      Electronically signed by: Gianluca Juarez  Date:    05/28/2022  Time:    21:10             Narrative:    EXAMINATION:  CT HEAD WITHOUT CONTRAST    CLINICAL HISTORY:  Seizure, new-onset, no history of trauma;    TECHNIQUE:  Low dose axial images were obtained through the head.  Coronal and sagittal reformations were also performed. Contrast was not administered.    COMPARISON:  None.    FINDINGS:  There is a 2.2 x 1.1 cm area of high density in the subcutaneous tissues posterior to the trapezius muscle at the level of the internal occipital protuberance.  The remainder of the scalp and calvarium appear unremarkable.    The brain parenchyma appears normal in attenuation with normal gray-white matter differentiation and no mass effect or pathologic fluid collection.  There is no ventriculomegaly.    Mild mucosal thickening in the left maxillary sinus is present.  No air-fluid levels are seen.  The orbits and orbital contents appear unremarkable.                                        ASSESSMENT AND PLAN:    Posterior reversible encephalopathy syndrome  Seizure  Hypoglycemia  Hypertension     Workup:   CT head:  No acute intracranial abnormality  MRI brain:  Bilateral symmetric  FLAIR hyperintensity in occipital lobes.  DWI images did not reveal any infarction.Findings concerning for posterior reversible encephalopathy syndrome  EEG:  Severe encephalopathy without epileptiform activity or seizures     Plan:  · Etiology of seizures likely secondary to PRES and hypoglycemia.  · Given MRI findings of posterior reversible encephalopathy syndrome at high risk of recurrent seizures, patient was started on Keppra maintenance dose of 500 b.i.d..  · Strict control blood pressure less than 140 systolic.  Patient currently on nicardipine drip.  Titrate to goal blood pressure.  Recommend to start p.o. antihypertensives and wean off nicardipine.  · After weaning off nicardipine, patient can be moved out of the ICU.  · Seizure precautions.  · Neuro checks per unit protocol.  · Physical and Occupational therapy evaluate and treat.  · Avoid medications that lower seizure threshold.  · Repeat MRI in 3 months to look for resolution of PRES changes  · Will continue to follow     Seizure education.       No driving for now, no swimming alone, no climbing high areas, no operation of heavy machinery or working with high risk electricity equipment.        Patient and/or next of kin informed.  Follow up Neurology in 2 weeks. Call [phone] to make an appointment.        Thank you kindly for including us in the care of this patient. Please do not hesitate to contact us with any questions.    Critical Care:  40 minutes of critical care time has been spent evaluating with the patient. Time includes chart review not limited to diagnostic imaging, labs, and vitals, patient assessment, discussion with family and nursing, current order evaluations, and new order entries.      Neptali Marley MD  Neurology/vascular Neurology  Date of Service: 06/01/2022  10:35 AM    Please note: This note was transcribed using voice recognition software. Because of this technology there are often uinintended grammatical, spelling,  and other transcription errors. Please disregard these errors.

## 2022-06-01 NOTE — PROGRESS NOTES
[facility]  Adult Nutrition  Consult Note    SUMMARY   Intervention:carbohydrate modified diet     Recommendations  Recommendation/Intervention:   1.) Consider adding consistent carb restriction to Renal diet   Goals: 1.) diet will advance within 48 hrs. 2.) pt will meet >50% of EEN   Nutrition Goal Status: 1.) goal met 2.) new  Communication of RD Recs: other (comment) (POC)    1. Hypoglycemia    2. Seizure      Past Medical History:   Diagnosis Date    CKD (chronic kidney disease), stage IV 4/12/2022    Diabetes mellitus due to underlying condition with unspecified complications 4/12/2022    Gastroparesis 4/12/2022    Heart failure with preserved ejection fraction 4/12/2022    EF 55% on 3/22    History of gastroesophageal reflux (GERD)     History of supraventricular tachycardia     Sickle cell trait 4/12/2022    Type 2 diabetes mellitus          Assessment and Plan  Nutrition Problem  Inadequate energy intake    Related to (etiology):   Acute illness    Signs and Symptoms (as evidenced by):   NPO, nausea/vomiting     Interventions/Recommendations (treatment strategy):  Advance diet when appropriate     Nutrition Diagnosis Status:   Improving         Malnutrition Assessment  Edema (Fluid Accumulation): 3-->moderate   Fluid Accumulation Evaluation: moderate    Body mass index is 30.28 kg/m².        Reason for Assessment  Reason For Assessment: RD follow up   General Information Comments: admits with seizure, epilepsy + nausea/vomiting, lethargy. Remains NPO and drowsy per chart review. RD consulted for malnutrition, will f/u with [facility] after diet advances. Per chart review, no weight loss noted, but edema noted (+3, +4).  6/1: Diet advanced to renal; tolerating. Adequate PO intake with 100% at breakfast, and 75% at lunch, good appetite per RN.     Nutrition Risk Screen  Nutrition Risk Screen: no indicators present    Nutrition/Diet History  Food Allergies: NKFA  Factors Affecting  "Nutritional Intake: NPO, nausea/vomiting    Anthropometrics  Temp: 98.1 °F (36.7 °C)  Height Method: Estimated  Height: 5' 2"  Height (inches): 62 in  Weight Method: Bed Scale  Weight: 75.1 kg (165 lb 9.1 oz)  Weight (lb): 165.57 lb  Ideal Body Weight (IBW), Female: 110 lb  % Ideal Body Weight, Female (lb): 142.1 %  BMI (Calculated): 30.3  BMI Grade: 25 - 29.9 - overweight  Usual Body Weight (UBW), k kg  % Usual Body Weight: 118.41  % Weight Change From Usual Weight: 18.17 %       Lab/Procedures/Meds  Pertinent Labs Reviewed: reviewed  BMP  Lab Results   Component Value Date     (L) 2022    K 3.8 2022     2022    CO2 20 (L) 2022    BUN 49 (H) 2022    CREATININE 4.9 (H) 2022    CALCIUM 7.6 (L) 2022    ANIONGAP 11 2022    ESTGFRAFRICA 13 (A) 2022    EGFRNONAA 11 (A) 2022     Lab Results   Component Value Date    ALBUMIN 1.9 (L) 2022     Lab Results   Component Value Date    CALCIUM 7.6 (L) 2022    PHOS 4.1 2022     Recent Labs   Lab 22  0041   POCTGLUCOSE 325*       Pertinent Medications Reviewed: reviewed  Scheduled Meds:   furosemide (LASIX) injection  40 mg Intravenous Daily    levetiracetam IV  500 mg Intravenous Q12H    mupirocin   Nasal BID    pantoprazole  40 mg Intravenous Daily     Continuous Infusions:   niCARdipine 5 mg/hr (22 1250)         Estimated/Assessed Needs  Weight Used For Calorie Calculations: 70.9 kg (156 lb 4.9 oz)  Energy Calorie Requirements (kcal): 1601-5946 kcals/day  Energy Need Method: [place]-St Su (x1.3af)  Protein Requirements: 71-85g  Weight Used For Protein Calculations: 70.9 kg (156 lb 4.9 oz)  Estimated Fluid Requirement Method: RDA Method  RDA Method (mL): 1700      Nutrition Prescription Ordered  Current Diet Order: Renal diet     Evaluation of Received Nutrient/Fluid Intake  Other Calories (kcal): 204 (D5)  IV Fluid (mL): 1200  Energy Calories Required: not meeting " needs  Protein Required: not meeting needs  Tolerance:  (NPO)  % Intake of Estimated Energy Needs: 75 - 100 %  % Meal Intake: 75 - 100 %    Nutrition Risk  Level of Risk/Frequency of Follow-up:  (x2 weekly)       Monitor and Evaluation  Food and Nutrient Intake: energy intake, food and beverage intake  Food and Nutrient Adminstration: diet order  Anthropometric Measurements: weight, weight change, body mass index  Biochemical Data, Medical Tests and Procedures: electrolyte and renal panel, glucose/endocrine profile, lipid profile  Nutrition-Focused Physical Findings: overall appearance       Nutrition Follow-Up  RD Follow-up?: Yes

## 2022-06-01 NOTE — CARE UPDATE
05/31/22 1927   Patient Assessment/Suction   Level of Consciousness (AVPU) alert   Respiratory Effort Normal;Unlabored   Expansion/Accessory Muscles/Retractions no use of accessory muscles   PRE-TX-O2   O2 Device (Oxygen Therapy) room air   SpO2 (!) 94 %   Pulse Oximetry Type Continuous   $ Pulse Oximetry - Multiple Charge Pulse Oximetry - Multiple   Pulse 98   Resp 20   Aerosol Therapy   $ Aerosol Therapy Charges PRN treatment not required   Respiratory Treatment Status (SVN) PRN treatment not required

## 2022-06-01 NOTE — SUBJECTIVE & OBJECTIVE
"Interval History: see "Hospital Course"    Review of Systems   Unable to perform ROS: Mental status change   Objective:     Vital Signs (Most Recent):  Temp: 98.2 °F (36.8 °C) (06/01/22 0800)  Pulse: 95 (06/01/22 0800)  Resp: 12 (06/01/22 0815)  BP: 136/68 (06/01/22 0800)  SpO2: 95 % (06/01/22 0800) Vital Signs (24h Range):  Temp:  [98 °F (36.7 °C)-98.8 °F (37.1 °C)] 98.2 °F (36.8 °C)  Pulse:  [] 95  Resp:  [10-27] 12  SpO2:  [92 %-98 %] 95 %  BP: (123-171)/(62-95) 136/68     Weight: 75.1 kg (165 lb 9.1 oz)  Body mass index is 30.28 kg/m².    Intake/Output Summary (Last 24 hours) at 6/1/2022 0854  Last data filed at 6/1/2022 0846  Gross per 24 hour   Intake 2129.58 ml   Output 400 ml   Net 1729.58 ml      Physical Exam  Vitals and nursing note reviewed.   Constitutional:       General: She is not in acute distress.     Appearance: She is well-developed. She is ill-appearing. She is not diaphoretic.      Comments: Anasarca.   HENT:      Head: Normocephalic and atraumatic.      Right Ear: External ear normal.      Left Ear: External ear normal.      Nose: Nose normal.   Eyes:      General: No scleral icterus.     Conjunctiva/sclera: Conjunctivae normal.   Neck:      Vascular: No JVD.   Cardiovascular:      Rate and Rhythm: Normal rate and regular rhythm.      Pulses: Normal pulses.      Heart sounds: Normal heart sounds. No murmur heard.    No friction rub. No gallop.   Pulmonary:      Effort: Pulmonary effort is normal. No respiratory distress.      Breath sounds: Normal breath sounds. No wheezing or rales.   Abdominal:      General: Bowel sounds are normal. There is no distension.      Palpations: Abdomen is soft.      Tenderness: There is no abdominal tenderness. There is no guarding or rebound.   Musculoskeletal:         General: No tenderness. Normal range of motion.      Cervical back: Neck supple.      Right lower leg: No edema.      Left lower leg: No edema.   Skin:     General: Skin is warm and dry.     "  Coloration: Skin is not pale.      Findings: No erythema or rash.   Neurological:      Mental Status: She is disoriented.       Significant Labs: All pertinent labs within the past 24 hours have been reviewed.    Significant Imaging: I have reviewed all pertinent imaging results/findings within the past 24 hours.

## 2022-06-01 NOTE — PLAN OF CARE
Problem: Adult Inpatient Plan of Care  Goal: Plan of Care Review  Outcome: Ongoing, Progressing     Problem: Infection  Goal: Absence of Infection Signs and Symptoms  Outcome: Ongoing, Progressing     Problem: Fall Injury Risk  Goal: Absence of Fall and Fall-Related Injury  Outcome: Ongoing, Progressing     Problem: Skin Injury Risk Increased  Goal: Skin Health and Integrity  Outcome: Ongoing, Progressing     No acute events overnight. Cardene titrated up to maintain SBP less than 140. Pt awake and alert and slept well. Anasarca remains. Left sided facial swelling more prominent than right side. Pt denies pain. Pt continues to be oliguric with a total of 150 u/o this shift.  Pt hyperglycemic in 300's requiring SSI. ML to FERNANDO intact no SS of extravasation. Safety maintained. No SS of distress noted.

## 2022-06-02 LAB
ALBUMIN SERPL BCP-MCNC: 1.9 G/DL (ref 3.5–5.2)
ALP SERPL-CCNC: 67 U/L (ref 55–135)
ALT SERPL W/O P-5'-P-CCNC: 17 U/L (ref 10–44)
ANION GAP SERPL CALC-SCNC: 9 MMOL/L (ref 8–16)
AST SERPL-CCNC: 14 U/L (ref 10–40)
BASOPHILS # BLD AUTO: 0.04 K/UL (ref 0–0.2)
BASOPHILS NFR BLD: 0.5 % (ref 0–1.9)
BILIRUB SERPL-MCNC: 0.6 MG/DL (ref 0.1–1)
BUN SERPL-MCNC: 49 MG/DL (ref 6–20)
CALCIUM SERPL-MCNC: 7.9 MG/DL (ref 8.7–10.5)
CHLORIDE SERPL-SCNC: 103 MMOL/L (ref 95–110)
CO2 SERPL-SCNC: 23 MMOL/L (ref 23–29)
CREAT SERPL-MCNC: 5 MG/DL (ref 0.5–1.4)
DIFFERENTIAL METHOD: ABNORMAL
EOSINOPHIL # BLD AUTO: 0.2 K/UL (ref 0–0.5)
EOSINOPHIL NFR BLD: 2.3 % (ref 0–8)
ERYTHROCYTE [DISTWIDTH] IN BLOOD BY AUTOMATED COUNT: 13.2 % (ref 11.5–14.5)
EST. GFR  (AFRICAN AMERICAN): 12 ML/MIN/1.73 M^2
EST. GFR  (NON AFRICAN AMERICAN): 11 ML/MIN/1.73 M^2
GLUCOSE SERPL-MCNC: 230 MG/DL (ref 70–110)
HCT VFR BLD AUTO: 27.3 % (ref 37–48.5)
HGB BLD-MCNC: 10 G/DL (ref 12–16)
IMM GRANULOCYTES # BLD AUTO: 0.03 K/UL (ref 0–0.04)
IMM GRANULOCYTES NFR BLD AUTO: 0.4 % (ref 0–0.5)
LYMPHOCYTES # BLD AUTO: 1.3 K/UL (ref 1–4.8)
LYMPHOCYTES NFR BLD: 15.2 % (ref 18–48)
MAGNESIUM SERPL-MCNC: 2 MG/DL (ref 1.6–2.6)
MCH RBC QN AUTO: 29.2 PG (ref 27–31)
MCHC RBC AUTO-ENTMCNC: 36.6 G/DL (ref 32–36)
MCV RBC AUTO: 80 FL (ref 82–98)
MONOCYTES # BLD AUTO: 1 K/UL (ref 0.3–1)
MONOCYTES NFR BLD: 11.4 % (ref 4–15)
NEUTROPHILS # BLD AUTO: 6 K/UL (ref 1.8–7.7)
NEUTROPHILS NFR BLD: 70.2 % (ref 38–73)
NRBC BLD-RTO: 0 /100 WBC
PHOSPHATE SERPL-MCNC: 4.5 MG/DL (ref 2.7–4.5)
PLATELET # BLD AUTO: 132 K/UL (ref 150–450)
PMV BLD AUTO: 9.3 FL (ref 9.2–12.9)
POCT GLUCOSE: 134 MG/DL (ref 70–110)
POCT GLUCOSE: 182 MG/DL (ref 70–110)
POCT GLUCOSE: 200 MG/DL (ref 70–110)
POCT GLUCOSE: 243 MG/DL (ref 70–110)
POTASSIUM SERPL-SCNC: 3.8 MMOL/L (ref 3.5–5.1)
PROT SERPL-MCNC: 4.8 G/DL (ref 6–8.4)
RBC # BLD AUTO: 3.42 M/UL (ref 4–5.4)
SODIUM SERPL-SCNC: 135 MMOL/L (ref 136–145)
WBC # BLD AUTO: 8.56 K/UL (ref 3.9–12.7)

## 2022-06-02 PROCEDURE — A4216 STERILE WATER/SALINE, 10 ML: HCPCS | Performed by: NURSE PRACTITIONER

## 2022-06-02 PROCEDURE — C9399 UNCLASSIFIED DRUGS OR BIOLOG: HCPCS | Performed by: STUDENT IN AN ORGANIZED HEALTH CARE EDUCATION/TRAINING PROGRAM

## 2022-06-02 PROCEDURE — 63600175 PHARM REV CODE 636 W HCPCS: Performed by: INTERNAL MEDICINE

## 2022-06-02 PROCEDURE — 84100 ASSAY OF PHOSPHORUS: CPT | Performed by: STUDENT IN AN ORGANIZED HEALTH CARE EDUCATION/TRAINING PROGRAM

## 2022-06-02 PROCEDURE — 63600175 PHARM REV CODE 636 W HCPCS: Performed by: STUDENT IN AN ORGANIZED HEALTH CARE EDUCATION/TRAINING PROGRAM

## 2022-06-02 PROCEDURE — 97530 THERAPEUTIC ACTIVITIES: CPT

## 2022-06-02 PROCEDURE — 97110 THERAPEUTIC EXERCISES: CPT

## 2022-06-02 PROCEDURE — 94761 N-INVAS EAR/PLS OXIMETRY MLT: CPT

## 2022-06-02 PROCEDURE — 25000003 PHARM REV CODE 250: Performed by: STUDENT IN AN ORGANIZED HEALTH CARE EDUCATION/TRAINING PROGRAM

## 2022-06-02 PROCEDURE — 20000000 HC ICU ROOM

## 2022-06-02 PROCEDURE — 99900035 HC TECH TIME PER 15 MIN (STAT)

## 2022-06-02 PROCEDURE — C9113 INJ PANTOPRAZOLE SODIUM, VIA: HCPCS | Performed by: STUDENT IN AN ORGANIZED HEALTH CARE EDUCATION/TRAINING PROGRAM

## 2022-06-02 PROCEDURE — 85025 COMPLETE CBC W/AUTO DIFF WBC: CPT | Performed by: STUDENT IN AN ORGANIZED HEALTH CARE EDUCATION/TRAINING PROGRAM

## 2022-06-02 PROCEDURE — 97165 OT EVAL LOW COMPLEX 30 MIN: CPT

## 2022-06-02 PROCEDURE — 36415 COLL VENOUS BLD VENIPUNCTURE: CPT | Performed by: STUDENT IN AN ORGANIZED HEALTH CARE EDUCATION/TRAINING PROGRAM

## 2022-06-02 PROCEDURE — 80053 COMPREHEN METABOLIC PANEL: CPT | Performed by: STUDENT IN AN ORGANIZED HEALTH CARE EDUCATION/TRAINING PROGRAM

## 2022-06-02 PROCEDURE — 25000003 PHARM REV CODE 250: Performed by: NURSE PRACTITIONER

## 2022-06-02 PROCEDURE — 83735 ASSAY OF MAGNESIUM: CPT | Performed by: STUDENT IN AN ORGANIZED HEALTH CARE EDUCATION/TRAINING PROGRAM

## 2022-06-02 RX ORDER — FUROSEMIDE 10 MG/ML
60 INJECTION INTRAMUSCULAR; INTRAVENOUS
Status: DISCONTINUED | OUTPATIENT
Start: 2022-06-02 | End: 2022-06-07 | Stop reason: HOSPADM

## 2022-06-02 RX ORDER — FUROSEMIDE 10 MG/ML
60 INJECTION INTRAMUSCULAR; INTRAVENOUS
Status: DISCONTINUED | OUTPATIENT
Start: 2022-06-02 | End: 2022-06-02

## 2022-06-02 RX ADMIN — NICARDIPINE HYDROCHLORIDE 6 MG/HR: 0.2 INJECTION, SOLUTION INTRAVENOUS at 07:06

## 2022-06-02 RX ADMIN — INSULIN DETEMIR 10 UNITS: 100 INJECTION, SOLUTION SUBCUTANEOUS at 09:06

## 2022-06-02 RX ADMIN — LEVETIRACETAM 500 MG: 100 INJECTION INTRAVENOUS at 09:06

## 2022-06-02 RX ADMIN — NICARDIPINE HYDROCHLORIDE 6 MG/HR: 0.2 INJECTION, SOLUTION INTRAVENOUS at 01:06

## 2022-06-02 RX ADMIN — PANTOPRAZOLE SODIUM 40 MG: 40 INJECTION, POWDER, LYOPHILIZED, FOR SOLUTION INTRAVENOUS at 09:06

## 2022-06-02 RX ADMIN — FUROSEMIDE 60 MG: 10 INJECTION, SOLUTION INTRAVENOUS at 09:06

## 2022-06-02 RX ADMIN — NICARDIPINE HYDROCHLORIDE 5 MG/HR: 0.2 INJECTION, SOLUTION INTRAVENOUS at 05:06

## 2022-06-02 RX ADMIN — Medication 20 ML: at 09:06

## 2022-06-02 RX ADMIN — MUPIROCIN: 20 OINTMENT TOPICAL at 09:06

## 2022-06-02 RX ADMIN — INSULIN ASPART 2 UNITS: 100 INJECTION, SOLUTION INTRAVENOUS; SUBCUTANEOUS at 05:06

## 2022-06-02 NOTE — SUBJECTIVE & OBJECTIVE
"Interval History: see "Hospital Course"    Review of Systems   Constitutional:  Positive for unexpected weight change.   Genitourinary:  Positive for decreased urine volume.   Skin:  Positive for pallor.   Neurological:  Positive for weakness. Negative for seizures and headaches.   Psychiatric/Behavioral:  Positive for decreased concentration.    All other systems reviewed and are negative.  Objective:     Vital Signs (Most Recent):  Temp: 98.5 °F (36.9 °C) (06/02/22 0330)  Pulse: 105 (06/02/22 0700)  Resp: 16 (06/02/22 0700)  BP: (!) 150/81 (06/02/22 0630)  SpO2: (!) 94 % (06/02/22 0700)   Vital Signs (24h Range):  Temp:  [98.1 °F (36.7 °C)-98.6 °F (37 °C)] 98.5 °F (36.9 °C)  Pulse:  [] 105  Resp:  [10-42] 16  SpO2:  [90 %-99 %] 94 %  BP: (123-153)/(65-85) 150/81     Weight: 79.4 kg (175 lb 0.7 oz)  Body mass index is 32.02 kg/m².    Intake/Output Summary (Last 24 hours) at 6/2/2022 0915  Last data filed at 6/2/2022 0600  Gross per 24 hour   Intake 1405.11 ml   Output 410 ml   Net 995.11 ml      Physical Exam  Vitals and nursing note reviewed.   Constitutional:       General: She is not in acute distress.     Appearance: She is well-developed. She is ill-appearing. She is not diaphoretic.      Comments: Anasarca.   HENT:      Head: Normocephalic and atraumatic.      Right Ear: External ear normal.      Left Ear: External ear normal.      Nose: Nose normal.   Eyes:      General: No scleral icterus.     Conjunctiva/sclera: Conjunctivae normal.   Neck:      Vascular: No JVD.   Cardiovascular:      Rate and Rhythm: Normal rate and regular rhythm.      Pulses: Normal pulses.      Heart sounds: Normal heart sounds. No murmur heard.    No friction rub. No gallop.   Pulmonary:      Effort: Pulmonary effort is normal. No respiratory distress.      Breath sounds: Normal breath sounds. No wheezing or rales.   Abdominal:      General: Bowel sounds are normal. There is no distension.      Palpations: Abdomen is soft.      " Tenderness: There is no abdominal tenderness. There is no guarding or rebound.   Musculoskeletal:         General: No tenderness. Normal range of motion.      Cervical back: Neck supple.      Right lower leg: No edema.      Left lower leg: No edema.   Skin:     General: Skin is warm and dry.      Coloration: Skin is not pale.      Findings: No erythema or rash.   Neurological:      Mental Status: She is oriented to person, place, and time. She is lethargic.       Significant Labs: All pertinent labs within the past 24 hours have been reviewed.    Significant Imaging: I have reviewed all pertinent imaging results/findings within the past 24 hours.

## 2022-06-02 NOTE — CARE UPDATE
06/02/22 0700   Patient Assessment/Suction   Level of Consciousness (AVPU)   (pt asleep)   Respiratory Effort Unlabored   All Lung Fields Breath Sounds clear   Rhythm/Pattern, Respiratory unlabored   PRE-TX-O2   O2 Device (Oxygen Therapy) room air   Pulse Oximetry Type Continuous   $ Pulse Oximetry - Multiple Charge Pulse Oximetry - Multiple   Aerosol Therapy   $ Aerosol Therapy Charges PRN treatment not required   Respiratory Treatment Status (SVN) PRN treatment not required

## 2022-06-02 NOTE — PROGRESS NOTES
Nephrology Consult Note        Patient Name: Tabby Howard  MRN: [id number]    Patient Class: IP- Inpatient   Admission Date: 2022  Length of Stay: 5 days  Date of Service: 2022    Attending Physician: Raymundo Parker MD  Primary Care Provider: Primary Doctor No    Reason for Consult: ckd4/acidosis/hyponatremia/anemia/htn/edema    SUBJECTIVE:     HPI: 32F with anemia, CKD, type 2 diabetes, gallstones, CKD 4, chronic diastolic heart failure, gastroparesis admitted on  for seizure and hypoglycemia. She has been taking her insulin but not able to hold any food down due to nausea and vomiting. She was treated several times in the ER for hypoglycemia, but had a witnessed clonic-tonic seizure for about 1 minute. Received Ativan and Keppra. Lisnoprill was topped today after 2 doses, probably over concern for worsening sCr. Amodipine and Clonidine was added in addition to nicardipine gtt. UO is low as documented and she is net positive since admission. Renal is consulted for co-management.     VSS, no new complains. UO still low, BP is up - will increase diuretics to 60 mg BID.    Past Medical History:   Diagnosis Date    CKD (chronic kidney disease), stage IV 2022    Diabetes mellitus due to underlying condition with unspecified complications 2022    Gastroparesis 2022    Heart failure with preserved ejection fraction 2022    EF 55% on 3/22    History of gastroesophageal reflux (GERD)     History of supraventricular tachycardia     Sickle cell trait 2022    Type 2 diabetes mellitus      Past Surgical History:   Procedure Laterality Date     SECTION      x 3    ESOPHAGOGASTRODUODENOSCOPY N/A 10/18/2019    Procedure: ESOPHAGOGASTRODUODENOSCOPY (EGD);  Surgeon: Gianluca Mendez MD;  Location: [facility];  Service: Endoscopy;  Laterality: N/A;     Family History   Problem Relation Age of Onset    Diabetes Mother     Diabetes Father      Social History     Tobacco Use     Smoking status: Never Smoker    Smokeless tobacco: Never Used   Substance Use Topics    Alcohol use: No    Drug use: No       Review of patient's allergies indicates:   Allergen Reactions    Penicillins Hives       Outpatient meds:  No current facility-administered medications on file prior to encounter.     Current Outpatient Medications on File Prior to Encounter   Medication Sig Dispense Refill    acetaminophen (TYLENOL) 325 MG tablet Take 2 tablets (650 mg total) by mouth every 6 (six) hours as needed for Pain or Temperature greater than (100.3). 30 tablet 0    blood-glucose meter kit Use as instructed 1 each 0    cephALEXin (KEFLEX) 500 MG capsule Take 500 mg by mouth 4 (four) times daily.      doxycycline (VIBRAMYCIN) 100 MG Cap Take 100 mg by mouth 2 (two) times daily.      ibuprofen (ADVIL,MOTRIN) 600 MG tablet Take 1 tablet (600 mg total) by mouth every 6 (six) hours as needed for Pain. 20 tablet 0    insulin (BASAGLAR KWIKPEN U-100 INSULIN) glargine 100 units/mL (3mL) SubQ pen Inject 28 Units into the skin every evening. 25.2 mL 3    insulin glulisine U-100 (APIDRA U-100 INSULIN) 100 unit/mL injection Inject 8 Units into the skin 3 (three) times daily before meals. 21.6 mL 3    metoclopramide HCl (REGLAN) 10 MG tablet Take 1 tablet (10 mg total) by mouth every 6 (six) hours as needed (headache, nausea or vomiting). 30 tablet 0    ondansetron (ZOFRAN) 4 MG tablet Take 1 tablet (4 mg total) by mouth every 6 (six) hours as needed for Nausea. 30 tablet 0    ondansetron (ZOFRAN-ODT) 4 MG TbDL Take 1 tablet (4 mg total) by mouth every 6 (six) hours as needed (nausea or vomiting). 12 tablet 0    oxyCODONE (ROXICODONE) 5 MG immediate release tablet Take 1 tablet (5 mg total) by mouth every 6 (six) hours as needed for Pain. 20 tablet 0    pantoprazole (PROTONIX) 40 MG tablet Take 1 tablet (40 mg total) by mouth once daily. 30 tablet 3    polyethylene glycol (GLYCOLAX) 17 gram PwPk Take 17 g by  mouth once daily. 30 each 1    promethazine (PHENERGAN) 25 MG suppository Place 1 suppository (25 mg total) rectally every 6 (six) hours as needed for Nausea (For severe nausea and vomiting not improved with oral medications). 10 suppository 0    senna-docusate 8.6-50 mg (SENNA WITH DOCUSATE SODIUM) 8.6-50 mg per tablet Take 1 tablet by mouth daily as needed for Constipation. 30 tablet 1       Scheduled meds:   furosemide (LASIX) injection  40 mg Intravenous Daily    insulin detemir U-100  10 Units Subcutaneous Daily    levetiracetam IV  500 mg Intravenous Q12H    mupirocin   Nasal BID    pantoprazole  40 mg Intravenous Daily       Infusions:   niCARdipine 5 mg/hr (06/02/22 0600)       PRN meds:  acetaminophen, albuterol-ipratropium, aluminum-magnesium hydroxide-simethicone, dextrose 10%, dextrose 10%, diphenhydrAMINE, EPINEPHrine, glucagon (human recombinant), glucose, glucose, hydrALAZINE, insulin aspart U-100, magnesium oxide, magnesium oxide, melatonin, naloxone, ondansetron, potassium bicarbonate, potassium bicarbonate, potassium bicarbonate, potassium, sodium phosphates, potassium, sodium phosphates, potassium, sodium phosphates, simethicone, sodium chloride 0.9%    Review of Systems:    OBJECTIVE:     Vital Signs and IO (Last 24H):  Temp:  [98.1 °F (36.7 °C)-98.6 °F (37 °C)]   Pulse:  []   Resp:  [10-42]   BP: (123-153)/(65-85)   SpO2:  [90 %-99 %]   I/O last 3 completed shifts:  In: 2751.3 [P.O.:1135; I.V.:1616.3]  Out: 650 [Urine:650]    Wt Readings from Last 5 Encounters:   06/02/22 79.4 kg (175 lb 0.7 oz)   05/06/22 70.3 kg (155 lb)   04/11/22 65.8 kg (145 lb)   12/20/21 59.9 kg (132 lb)   05/04/21 65.6 kg (144 lb 11.7 oz)     Physical Exam:  Physical Exam  Constitutional:       Appearance: She is well-developed. She is not diaphoretic.   HENT:      Head: Normocephalic and atraumatic.   Eyes:      General: No scleral icterus.     Pupils: Pupils are equal, round, and reactive to light.    Cardiovascular:      Rate and Rhythm: Normal rate and regular rhythm.   Pulmonary:      Effort: Pulmonary effort is normal. No respiratory distress.      Breath sounds: No stridor.   Abdominal:      General: There is no distension.      Palpations: Abdomen is soft.   Musculoskeletal:         General: No deformity. Normal range of motion.      Cervical back: Neck supple.   Skin:     General: Skin is warm and dry.      Findings: No erythema or rash.   Neurological:      Mental Status: She is alert and oriented to person, place, and time.      Cranial Nerves: No cranial nerve deficit.   Psychiatric:         Behavior: Behavior normal.         Body mass index is 32.02 kg/m².    Laboratory:  Recent Labs   Lab 05/31/22 0333 06/01/22  0322 06/02/22  0402   * 135* 135*   K 3.5 3.8 3.8    104 103   CO2 21* 20* 23   BUN 45* 49* 49*   CREATININE 4.3* 4.9* 5.0*   ESTGFRAFRICA 15* 13* 12*   EGFRNONAA 13* 11* 11*   * 227* 230*       Recent Labs   Lab 05/31/22 0333 06/01/22  0322 06/02/22  0402   CALCIUM 7.8* 7.6* 7.9*   ALBUMIN 1.9* 1.9* 1.9*   PHOS 4.1 4.1 4.5   MG 1.9 1.9 2.0             Recent Labs   Lab 05/31/22  1140 05/31/22  1649 05/31/22  2140 05/31/22  2141 06/01/22  0041 06/01/22  0549 06/01/22  1156 06/01/22  1740 06/01/22  2206 06/02/22  0517   POCTGLUCOSE 233* 280* 405* 372* 325* 204* 192* 256* 261* 243*       Recent Labs   Lab 01/26/22  0240 03/06/22  1135 05/15/22  1954   Hemoglobin A1C 6.8 H 5.3 5.8 H       Recent Labs   Lab 05/31/22  0333 06/01/22  0322 06/02/22  0402   WBC 7.98 8.22 8.56   HGB 10.8* 9.9* 10.0*   HCT 30.6* 28.5* 27.3*   * 130* 132*   MCV 81* 82 80*   MCHC 35.3 34.7 36.6*   MONO 9.6  0.8 11.3  0.9 11.4  1.0       Recent Labs   Lab 05/31/22  0333 06/01/22  0322 06/02/22  0402   BILITOT 0.7 0.6 0.6   PROT 4.8* 4.7* 4.8*   ALBUMIN 1.9* 1.9* 1.9*   ALKPHOS 66 62 67   ALT 20 17 17   AST 21 16 14       Recent Labs   Lab 09/10/20  0355 09/10/20  0355 12/13/20  1702  04/23/21 2006 04/24/21  1259 05/28/22 2036   Color, UA Yellow  --  Red A Yellow Yellow Yellow   Appearance, UA Clear  --  Cloudy A Clear Clear Clear   pH, UA 8.0  --  6.0 6.0 6.0 7.0   Specific Gravity, UA 1.010  --  1.010 1.020 1.010 1.020   Protein, UA Negative  --  2+ A 3+ A 3+ A 3+ A   Glucose, UA 3+ A  --  3+ A 3+ A 3+ A 2+ A   Ketones, UA Trace A  --  1+ A 1+ A Trace A Negative   Urobilinogen, UA 1.0  --  1.0  --   --  Negative   Bilirubin (UA) Negative  --  Negative Negative Negative Negative   Occult Blood UA 1+ A  --  3+ A 2+ A 1+ A 2+ A   Nitrite, UA Negative  --  Positive A Negative Negative Negative   RBC, UA 2  --  >100 H 54 H 2 5 H   WBC, UA 12 H  --  2 44 H 5 1   Bacteria Many A  --  Few A Occasional Rare None   Hyaline Casts, UA  --    < > 0 0 0 0    < > = values in this interval not displayed.       Recent Labs   Lab 12/13/20  1042 04/23/21  1952 04/23/21 2006 04/11/22 2043 04/11/22  2345 04/12/22  0211   POC PH 7.457 H 7.458 H  --  7.365  --   --    POC PCO2 32.6 L 32.9 L  --  39.5  --   --    POC HCO3 23.0 L 23.3 L  --  22.6 L  --   --    POC PO2 26 LL 24 LL  --  25 L  --   --    POC SATURATED O2 53 L 47 L  --  42 L  --   --    POC BE -1 -1  --  -3  --   --    Sample VENOUS VENOUS   < > VENOUS VENOUS VENOUS    < > = values in this interval not displayed.       Microbiology Results (last 7 days)     ** No results found for the last 168 hours. **        ASSESSMENT/PLAN:     CKD stage 4  Edema  Hyponatremia  Acidosis  Uncontrolled DM  No NSAIDs, ACEI/ARB, IV contrast or other nephrotoxins.  Keep MAP > 60, SBP > 100.  Dose meds for GFR < 30 ml/min.  Renal diet - low K, low phos.  Hold oral BP meds, increase diuretic IV and titrate as tolerated, keep washington with close IO. Tolerate -160 to help diuresis, keep nicardipine drip.  Control DM.    Anemia of CKD  Hgb and HCT are acceptable. Monitor.  Will provide SHELLEY and/or IV iron PRN.    HTN  BP seem controlled.   Tolerate asymptomatic HTN up to  -160.  Stop all oral meds, keep Nicardipine, increase diuretic as tolerated to deal with edema, keep washington.    Thank you for allowing us to participate in the care of your patient!   We will follow the patient and provide recommendations as needed.    Patient care time was spent personally by me on the following activities:   · Obtaining a history.  · Examination of patient.  · Providing medical care at the patients bedside.  · Developing a treatment plan with patient or surrogate and bedside caregivers.  · Ordering and reviewing laboratory studies, radiographic studies, pulse oximetry.  · Ordering and performing treatments and interventions.  · Evaluation of patient's response to treatment.  · Discussions with consultants while on the unit and immediately available to the patient.  · Re-evaluation of the patient's condition.  · Documentation in the medical record.     Mando Benitez MD    [facility]  [address] [place] [id number]    [phone] - tel  [phone] - fax    6/2/2022

## 2022-06-02 NOTE — PLAN OF CARE
Problem: Occupational Therapy  Goal: Occupational Therapy Goal  Description: Goals to be met by: 6/16/2022     Patient will increase functional independence with ADLs by performing:    UE Dressing with Set-up Assistance.  LE Dressing with Set-up Assistance.  Grooming while seated with Set-up Assistance.  Toileting from toilet with Stand-by Assistance for hygiene and clothing management.   Toilet transfer to toilet with Stand-by Assistance.    Outcome: Ongoing, Progressing     OT evaluation completed. POC established.

## 2022-06-02 NOTE — PLAN OF CARE
Problem: Adult Inpatient Plan of Care  Goal: Absence of Hospital-Acquired Illness or Injury  Outcome: Ongoing, Progressing     Problem: Infection  Goal: Absence of Infection Signs and Symptoms  Outcome: Ongoing, Progressing     Problem: Diabetes Comorbidity  Goal: Blood Glucose Level Within Targeted Range  Outcome: Ongoing, Progressing     Problem: Seizure, Active Management  Goal: Absence of Seizure/Seizure-Related Injury  Outcome: Ongoing, Progressing     Problem: Adjustment to Illness (Delirium)  Goal: Optimal Coping  Outcome: Ongoing, Progressing   Pt slept most of day. Just now waking up to stay awake. Worked with PT and OT today. Sat up in chair for about 45 minutes before asking to return to bed. Encouraged to stay up but only managed for maybe 30 more minutes. Returned to bed and slept. Very minimal urine output this shift. Edema remains to face and upper extremities. Midline infusing cardene to keep SBP <140 and map <105 per orders.

## 2022-06-02 NOTE — PT/OT/SLP PROGRESS
Physical Therapy Treatment    Patient Name:  Tabby Howard   MRN:  [id number]    Recommendations:     Discharge Recommendations:  home health PT   Discharge Equipment Recommendations: none   Barriers to discharge: None    Assessment:     Tabby Howard is a 32 y.o. female admitted with a medical diagnosis of PRES (posterior reversible encephalopathy syndrome).  She presents with the following impairments/functional limitations:  weakness, impaired endurance, impaired functional mobilty, gait instability, impaired balance, impaired cognition, decreased safety awareness, pain, edema, impaired cardiopulmonary response to activity.      Upon arrival pt is drowsy and responds minimally to voice. While in bed, PT performed passive ROM exercises to assist with patient arousal. After encouragement from PT, pt was agreeable to therapy.  Pt performed bed mobility (rolling left, supine to sit and scooting) with mod to min assist. Once seated EOB, pt requested to speak with her father over the phone. While conversing with father, pt sat EOB approx. 15minutes with stand by assist. Pt was hesitant to perform bed to chair transfer, but father encouraged her to continue participating in therapy. Pt performed sit to stand transfer with mod to min assist and took approx. 2 steps to reach bedside chair. Once seated, pt was repositioned in chair and in direct line of sight of nurses station to prevent risk for falls. Pt would benefit from  PT upon discharge.     Rehab Prognosis: Fair; patient would benefit from acute skilled PT services to address these deficits and reach maximum level of function.    Recent Surgery: * No surgery found *      Plan:     During this hospitalization, patient to be seen 6 x/week to address the identified rehab impairments via gait training, therapeutic activities, therapeutic exercises and progress toward the following goals:    · Plan of Care Expires:  06/30/22    Subjective   Pt states that she would  like for her father to come visit her today. She also reports that she misses her 3 children.   Chief Complaint: none stated   Patient/Family Comments/goals: none stated   Pain/Comfort:  · Pain Rating 1:  (pt complained of R arm irritation due to peripheral IV  Simultaneous filing. User may not have seen previous data.)      Objective:     Communicated with nurse Natalie  prior to session.  Patient found up in chair with   upon PT entry to room.     General Precautions: Standard, fall, diabetic, seizure   Orthopedic Precautions:N/A   Braces:    Respiratory Status: Room air     Functional Mobility:  · Bed Mobility:     · Rolling Left:  minimum assistance and moderate assistance  · Supine to Sit: minimum assistance and moderate assistance  · Transfers:     · Sit to Stand:  minimum assistance and moderate assistance with hand-held assist      AM-PAC 6 CLICK MOBILITY  Turning over in bed (including adjusting bedclothes, sheets and blankets)?: 3  Sitting down on and standing up from a chair with arms (e.g., wheelchair, bedside commode, etc.): 3  Moving from lying on back to sitting on the side of the bed?: 3  Moving to and from a bed to a chair (including a wheelchair)?: 3  Need to walk in hospital room?: 2  Climbing 3-5 steps with a railing?: 1  Basic Mobility Total Score: 15       Therapeutic Activities and Exercises:   PT encouraged pt to participate in sitting up in chair and informed pt of the detrimental effects of prolonged bed rest.     Patient left up in chair with all lines intact, call button in reach and chair alarm on..    GOALS:   Multidisciplinary Problems     Physical Therapy Goals        Problem: Physical Therapy    Goal Priority Disciplines Outcome Goal Variances Interventions   Physical Therapy Goal     PT, PT/OT Ongoing, Progressing     Description: Goals to be met by: 2022     Patient will increase functional independence with mobility by performin. Supine to sit with MInimal  Assistance  2. Sit to stand transfer with Minimal Assistance  3. Bed to chair transfer with Minimal Assistance using Rolling Walker  4. Gait  x 250 feet with Minimal Assistance using Rolling Walker.   5. Lower extremity exercise program x20 reps                   Time Tracking:     PT Received On: 06/02/22  PT Start Time: 1117     PT Stop Time: 1150  PT Total Time (min): 33 min     Billable Minutes: Therapeutic Activity 20 and Therapeutic Exercise 13    Treatment Type: Treatment  PT/PTA: PT     PTA Visit Number: 0     06/02/2022

## 2022-06-02 NOTE — ASSESSMENT & PLAN NOTE
Patient likely with undiagnosed HTN contributing to CKD.  -Cardene infusion  -Nephrology following

## 2022-06-02 NOTE — DISCHARGE INSTRUCTIONS
Discharge Instructions, [facility]    Thank you for choosing [facility] for your medical care. The primary doctor who is taking care of you at the time of your discharge is Linda Cueto MD.     You were admitted to the hospital with PRES (posterior reversible encephalopathy syndrome).     Please note your discharge instructions, including diet/activity restrictions, follow-up appointments, and medication changes.  If you have any questions about your medical issues, prescriptions, or any other questions, please feel free to contact the [facility] Dept at [phone] and we will help.    If you are previously with Home health, outpatient PT/OT or under a therapy program, you are cleared to return to those programs.    Please direct all long term medication refills and follow up to your primary care provider, Primary Doctor No. Thank you again for letting us take care of your health care needs.    Please note the following discharge instructions per your discharging physician-    No driving for now, no swimming alone, no climbing high areas, no operation of heavy machinery or working with high risk electricity equipment  Follow up Neurology in 2 weeks. Call [phone] to make an appointment    Take all medications as prescribed  Get repeat labs drawn on Monday and follow up with Dr. Clark in 2-4 weeks

## 2022-06-02 NOTE — ASSESSMENT & PLAN NOTE
Chronic. Stable.  -Renally dose all medications  -Avoid nephrotoxic agents  -Nephrology consulted  -Monitor UOP and electrolytes

## 2022-06-02 NOTE — PT/OT/SLP EVAL
Occupational Therapy   Evaluation    Name: Tabby Howard  MRN: [id number]  Admitting Diagnosis:  PRES (posterior reversible encephalopathy syndrome)  Recent Surgery: * No surgery found *    Recommendations:     Discharge Recommendations:  Home health OT  Discharge Equipment Recommendations:  None  Barriers to discharge:  None    Assessment:     Tabby Howard is a 32 y.o. female with a medical diagnosis of PRES (posterior reversible encephalopathy syndrome).  She presents with performance deficits affecting function: weakness, impaired endurance, impaired self care skills, impaired functional mobility and gait instability.    Limited information obtained secondary to patient fatigue and drowsiness.    Rehab Prognosis: Fair; patient would benefit from acute skilled OT services to address these deficits and reach maximum level of function.       Plan:     Patient to be seen 5 x/week to address the above listed problems via self-care/home management, therapeutic activities, therapeutic exercises  · Plan of Care Expires: 06/16/22  · Plan of Care Reviewed with: patient    Subjective     Chief Complaint: None stated  Patient/Family Comments/goals: None stated    Occupational Profile:  Living Environment: Pt stated she lives with her Dad.  Previous level of function: Independent with ADLs  Equipment Used at Home: None  Assistance upon Discharge: Pt will have assistance from her family.    Pain/Comfort:       Objective:     Communicated with: nurse prior to session.  Patient found right sidelying with telemetry, pulse ox (continuous), blood pressure cuff, bed alarm, washington catheter and peripheral IV upon OT entry to room.    General Precautions: Standard, diabetic, pureed diet, fall, seizure    Orthopedic Precautions:N/A   Braces: N/A  Respiratory Status: Room air    Occupational Performance:    Bed Mobility:    · Rolling/Turning to Left with minimum assistance  · Rolling/Turning to Right with minimum  assistance  · Scooting/Bridging with minimum assistance  · Supine to Sit with minimum assistance  · Sit to Supine with minimum assistance    Activities of Daily Living:  · Feeding:  independence  · Grooming: Pt washed her face with set up A/SBA.  · Upper Body Dressing: minimum assistance  · Lower Body Dressing: mod assistance  · Toileting: maximal assistance    Cognitive/Visual Perceptual:  -       Follows Commands/attention: Follows one-step commands    Physical Exam:  Upper Extremity Range of Motion:  -       Right Upper Extremity: WFL  -       Left Upper Extremity: WFL  Upper Extremity Strength: -       Right Upper Extremity: WFL  -       Left Upper Extremity: WFL    AMPAC 6 Click ADL:  AMPAC Total Score: 16    Treatment & Education:  Pt was given education on role of OT and POC.  Education:    Patient left right sidelying with all lines intact, call button in reach and bed alarm on    GOALS:   Multidisciplinary Problems     Occupational Therapy Goals        Problem: Occupational Therapy    Goal Priority Disciplines Outcome Interventions   Occupational Therapy Goal     OT, PT/OT Ongoing, Progressing    Description: Goals to be met by: 6/16/2022     Patient will increase functional independence with ADLs by performing:    UE Dressing with Set-up Assistance.  LE Dressing with Set-up Assistance.  Grooming while seated with Set-up Assistance.  Toileting from toilet with Stand-by Assistance for hygiene and clothing management.   Toilet transfer to toilet with Stand-by Assistance.                     History:     Past Medical History:   Diagnosis Date    CKD (chronic kidney disease), stage IV 4/12/2022    Diabetes mellitus due to underlying condition with unspecified complications 4/12/2022    Gastroparesis 4/12/2022    Heart failure with preserved ejection fraction 4/12/2022    EF 55% on 3/22    History of gastroesophageal reflux (GERD)     History of supraventricular tachycardia     Sickle cell trait 4/12/2022     Type 2 diabetes mellitus          Past Surgical History:   Procedure Laterality Date     SECTION      x 3    ESOPHAGOGASTRODUODENOSCOPY N/A 10/18/2019    Procedure: ESOPHAGOGASTRODUODENOSCOPY (EGD);  Surgeon: Gianluca Mendez MD;  Location: [facility];  Service: Endoscopy;  Laterality: N/A;       Time Tracking:     OT Date of Treatment: 22  OT Start Time: 1317  OT Stop Time: 1326  OT Total Time (min): 9 min    Billable Minutes:Evaluation 9    2022

## 2022-06-02 NOTE — PLAN OF CARE
Problem: Adult Inpatient Plan of Care  Goal: Plan of Care Review  Outcome: Ongoing, Progressing     Problem: Adult Inpatient Plan of Care  Goal: Patient-Specific Goal (Individualized)  Outcome: Ongoing, Progressing   Was drowsy on initial assessment.Does follows command. VALDOVINOS. Speech clear, was able to talked to family members on the cell phone. Generalized edema noted. Face swollen, including eyelids. Sinus rhythm/sinus tachycardia. On cardene drip at 5mg/hour. Midline right brachial. Room air, lungs clear. Pelayo intact yellow urine with white sediments. Scant output. Accucheck was 261. Was covered. Continue to monitor. Pt also had vanilla pudding (sugar free). Benadryl po given for itching x1. No seizures noted for the shift. Safety/fall prevention. Bed alarms on.   Had short episode of sleep apnea.

## 2022-06-02 NOTE — ASSESSMENT & PLAN NOTE
-Monitor in ICU  -Seizure precautions  -PRN IV Ativan  -Cardene infusion for BP control  -Neurology following; will need repeat MRI in three months  -Keppra  -Neurologic checks

## 2022-06-02 NOTE — CARE UPDATE
06/01/22 1959   Patient Assessment/Suction   Level of Consciousness (AVPU) alert   Respiratory Effort Normal;Unlabored   Expansion/Accessory Muscles/Retractions no use of accessory muscles;expansion symmetric   Rhythm/Pattern, Respiratory unlabored;pattern regular   PRE-TX-O2   O2 Device (Oxygen Therapy) room air   SpO2 (!) 93 %   Pulse Oximetry Type Continuous   $ Pulse Oximetry - Multiple Charge Pulse Oximetry - Multiple   Pulse 93   Resp 16   Aerosol Therapy   $ Aerosol Therapy Charges PRN treatment not required   Respiratory Treatment Status (SVN) PRN treatment not required

## 2022-06-02 NOTE — PROGRESS NOTES
[facility] Hospital Medicine  Progress Note    Patient Name: Tabby Howard  MRN: [id number]  Patient Class: IP- Inpatient   Admission Date: 5/28/2022  Length of Stay: 5 days  Attending Physician: Raymundo Parker MD  Primary Care Provider: Primary Doctor No        Subjective:     Principal Problem:PRES (posterior reversible encephalopathy syndrome)        HPI:  Tabby Howard 32-year-old female presents emergency room for evaluation of seizure and hypoglycemia.  Patient has a history of diabetes and gastroparesis.  She reports that she has been taking her insulin but not able to hold any food down due to nausea and vomiting.  She was treated several times in the ER for hypoglycemia using dextrose 50%.  Just before my exam patient was reported to have clonic tonic seizure activity which lasted approximately 1 minute; however her glucose at the time was approximately 120. Previous medical history includes anemia, CKD, type 2 diabetes, gallstones, CKD, chronic diastolic heart failure, and gastroparesis.  ER workup:  CBC with thrombocytopenia of 125.  Original glucose on CMP was 48. BUN 46 and creatinine of 3.9 (baseline).  Urinalysis with 5 red blood cells and rare yeast.  CT the head was negative.  Patient was given Ativan status post seizure as well as given a Keppra loading dose.  Patient be admitted to Hospital Medicine for observation management.  Patient be placed in ICU given continue seizure and hypoglycemia.  Will consult Neurology in a.m..      Overview/Hospital Course:  Tabby Howard is a 32 year old female with a past medical history of IDDM, CKD IV, gastroparesis, and HFpEF who presented with seizure. She was discovered to be hypoglycemic and was started on a D5 infusion (since discontinued). Neurology was consulted. Her BP was observed to be elevated as well and she was started on a Cardene infusion. EEG of the brain did not show epileptiform activity, yet MRI of the brain showed  "concern for PRES. Keppra was started per Neurology 5/30. Her mental status continued to improve. Nephrology was consulted 6/1 given the patient's progressive CKD. She currently remains in the ICU on a Cardene infusion. She is working with PT/OT.      Interval History: see "Hospital Course"    Review of Systems   Constitutional:  Positive for unexpected weight change.   Genitourinary:  Positive for decreased urine volume.   Skin:  Positive for pallor.   Neurological:  Positive for weakness. Negative for seizures and headaches.   Psychiatric/Behavioral:  Positive for decreased concentration.    All other systems reviewed and are negative.  Objective:     Vital Signs (Most Recent):  Temp: 98.5 °F (36.9 °C) (06/02/22 0330)  Pulse: 105 (06/02/22 0700)  Resp: 16 (06/02/22 0700)  BP: (!) 150/81 (06/02/22 0630)  SpO2: (!) 94 % (06/02/22 0700)   Vital Signs (24h Range):  Temp:  [98.1 °F (36.7 °C)-98.6 °F (37 °C)] 98.5 °F (36.9 °C)  Pulse:  [] 105  Resp:  [10-42] 16  SpO2:  [90 %-99 %] 94 %  BP: (123-153)/(65-85) 150/81     Weight: 79.4 kg (175 lb 0.7 oz)  Body mass index is 32.02 kg/m².    Intake/Output Summary (Last 24 hours) at 6/2/2022 0915  Last data filed at 6/2/2022 0600  Gross per 24 hour   Intake 1405.11 ml   Output 410 ml   Net 995.11 ml      Physical Exam  Vitals and nursing note reviewed.   Constitutional:       General: She is not in acute distress.     Appearance: She is well-developed. She is ill-appearing. She is not diaphoretic.      Comments: Anasarca.   HENT:      Head: Normocephalic and atraumatic.      Right Ear: External ear normal.      Left Ear: External ear normal.      Nose: Nose normal.   Eyes:      General: No scleral icterus.     Conjunctiva/sclera: Conjunctivae normal.   Neck:      Vascular: No JVD.   Cardiovascular:      Rate and Rhythm: Normal rate and regular rhythm.      Pulses: Normal pulses.      Heart sounds: Normal heart sounds. No murmur heard.    No friction rub. No gallop. "   Pulmonary:      Effort: Pulmonary effort is normal. No respiratory distress.      Breath sounds: Normal breath sounds. No wheezing or rales.   Abdominal:      General: Bowel sounds are normal. There is no distension.      Palpations: Abdomen is soft.      Tenderness: There is no abdominal tenderness. There is no guarding or rebound.   Musculoskeletal:         General: No tenderness. Normal range of motion.      Cervical back: Neck supple.      Right lower leg: No edema.      Left lower leg: No edema.   Skin:     General: Skin is warm and dry.      Coloration: Skin is not pale.      Findings: No erythema or rash.   Neurological:      Mental Status: She is oriented to person, place, and time. She is lethargic.       Significant Labs: All pertinent labs within the past 24 hours have been reviewed.    Significant Imaging: I have reviewed all pertinent imaging results/findings within the past 24 hours.      Assessment/Plan:      * PRES (posterior reversible encephalopathy syndrome)  -Monitor in ICU  -Seizure precautions  -PRN IV Ativan  -Cardene infusion for BP control  -Neurology following; will need repeat MRI in three months  -Keppra  -Neurologic checks    Seizure  Induced by hypoglycemia and/or PRES.  -Neurology consulted  -Neuro checks  -Seizure, fall and aspiration precautions  -PRN IV Ativan  -Cardene infusion for BP control; weaning  -Keppra        Elevated blood pressure reading  Patient likely with undiagnosed HTN contributing to CKD.  -Cardene infusion  -Nephrology following      Thrombocytopenia  -Trend CBC  -No chemical DVT prophylaxis      Heart failure with preserved ejection fraction  -Telemetry  -Hold home PO medications          Gastroparesis  Chronic.  -Pureed diet  -Hold Reglan as it lowers seizure threshold      CKD (chronic kidney disease), stage IV  Chronic. Stable.  -Renally dose all medications  -Avoid nephrotoxic agents  -Nephrology consulted  -Monitor UOP and electrolytes      Anemia of  chronic kidney failure  -Trend CBC      Type II diabetes mellitus  -Detemir 10 daily  -SSI  -AC HS glucose checks  -Hypoglycemic precautions  -Diabetic, renal diet      VTE Risk Mitigation (From admission, onward)         Ordered     IP VTE LOW RISK PATIENT  Once         05/29/22 0004     Place sequential compression device  Until discontinued         05/29/22 0004     Place JOCELYNE hose  Until discontinued         05/29/22 0004                Discharge Planning   TATIANA: 6/3/2022     Code Status: Full Code   Is the patient medically ready for discharge?:     Reason for patient still in hospital (select all that apply): Patient trending condition, Treatment and Consult recommendations  Discharge Plan A: Home with family            Critical care time spent on the evaluation and treatment of severe organ dysfunction, review of pertinent labs and imaging studies, discussions with consulting providers and discussions with patient/family: 33 minutes.      Raymundo Parker MD  Department of Hospital Medicine   [facility]

## 2022-06-02 NOTE — PROGRESS NOTES
[facility]  Progress Note  Date: 2022 10:35 AM            Patient Name: Tabby Howard   MRN: [id number]   : 1990    AGE: 32 y.o.    LOS: 5 days Hospital Day: 6  Admit date: 2022  8:17 PM         HPI per EMR: Tabby Howard is a 32 y.o. female with a history of anemia, CKD, type 2 diabetes, gallstones, CKD, chronic diastolic heart failure, and gastroparesis. presents emergency room for evaluation of seizure and hypoglycemia.  Patient has a history of diabetes and gastroparesis.  She reports that she has been taking her insulin but not able to hold any food down due to nausea and vomiting.  She was treated several times in the ER for hypoglycemia using dextrose 50%.  In the ER, patient was reported to have clonic tonic seizure activity which lasted approximately 1 minute.  Patient was given Ativan status post seizure as well as given a Keppra loading dose.     Neurology consult:  Patient was seen examined by me this morning.  She is lethargic, opens eyes to stimulus is able to tell me her name however she does not follow commands or answer any other questions.  She is able to move all extremities symmetrically.     Patient presented to the hospital with severe nausea and vomiting.  In the ER, she was reported to have a generalized tonic-clonic seizure lasting for about a minute.  She did bite her tongue during the episode.  Prior to the episode, her blood sugar was 48. She received a loading dose of Keppra.  She was admitted to the ICU for further care.    2022: No acute events overnight. Patient was seen and examined by me this morning. Neuro exam is improving.  Patient is drowsy however wakes up to stimulus and is oriented to place, year but not to month.  She is able to follow commands.    2022:  No acute events overnight.  No further seizure-like activity.  On exam, patient is drowsy however wakes up to stimulus is able to answer questions and follow  commands.    06/01/2022:  Patient was seen examined by me this morning.  No acute events overnight.  No further seizure-like activity.  She remains to be drowsy but wakes up, she is oriented to time place and person and is able to follow commands.    06/02/2022:  Patient was seen examined by me this morning.  No acute events overnight.  Remains to be on nicardipine drip with blood pressure goal less than 140 systolic       Vitals:  Patient Vitals for the past 24 hrs:   BP Temp Temp src Pulse Resp SpO2 Weight   06/02/22 0700 -- -- -- 105 16 (!) 94 % --   06/02/22 0630 (!) 150/81 -- -- 105 13 (!) 91 % --   06/02/22 0600 (!) 150/80 -- -- 107 16 (!) 92 % --   06/02/22 0530 (!) 144/81 -- -- 106 (!) 24 97 % --   06/02/22 0500 (!) 145/76 -- -- 103 (!) 42 95 % --   06/02/22 0430 (!) 146/78 -- -- 107 17 (!) 94 % --   06/02/22 0400 (!) 152/80 -- -- 104 17 (!) 93 % --   06/02/22 0330 (!) 145/85 98.5 °F (36.9 °C) Oral 102 17 (!) 90 % 79.4 kg (175 lb 0.7 oz)   06/02/22 0230 (!) 140/75 -- -- 97 12 95 % --   06/02/22 0200 137/73 -- -- 98 17 (!) 94 % --   06/02/22 0130 137/70 -- -- 99 17 (!) 94 % --   06/02/22 0030 (!) 141/72 -- -- 100 12 (!) 94 % --   06/02/22 0000 (!) 142/73 98.6 °F (37 °C) Oral 100 15 (!) 93 % --   06/01/22 2330 (!) 145/72 -- -- 99 12 (!) 94 % --   06/01/22 2300 (!) 151/81 -- -- 109 17 97 % --   06/01/22 2230 (!) 149/81 -- -- 101 (!) 24 98 % --   06/01/22 2200 135/77 -- -- 96 (!) 21 (!) 93 % --   06/01/22 2133 (!) 143/73 -- -- -- -- -- --   06/01/22 2130 (!) 143/73 -- -- 96 18 99 % --   06/01/22 2100 (!) 145/81 -- -- 95 16 (!) 92 % --   06/01/22 2030 (!) 143/81 -- -- 93 16 (!) 92 % --   06/01/22 2000 (!) 143/81 98.4 °F (36.9 °C) Oral 93 16 (!) 92 % --   06/01/22 1959 -- -- -- 93 16 (!) 93 % --   06/01/22 1930 (!) 141/79 -- -- 93 16 (!) 92 % --   06/01/22 1900 (!) 146/80 -- -- 95 16 (!) 91 % --   06/01/22 1830 (!) 149/84 -- -- 97 10 (!) 93 % --   06/01/22 1800 (!) 151/83 -- -- 98 20 99 % --   06/01/22 1730 123/82  -- -- 106 (!) 23 96 % --   06/01/22 1700 137/82 -- -- 95 15 96 % --   06/01/22 1630 135/69 -- -- 94 (!) 21 98 % --   06/01/22 1600 129/69 -- -- 94 14 96 % --   06/01/22 1530 127/69 98.4 °F (36.9 °C) Axillary 96 20 98 % --   06/01/22 1500 125/67 -- -- 94 (!) 22 95 % --   06/01/22 1430 125/73 -- -- 95 14 99 % --   06/01/22 1400 125/73 -- -- 94 15 (!) 93 % --   06/01/22 1330 137/78 -- -- 97 11 (!) 94 % --   06/01/22 1300 (!) 141/82 -- -- 100 16 96 % --   06/01/22 1249 137/79 -- -- 100 17 97 % --   06/01/22 1230 137/79 -- -- 100 18 96 % --     PHYSICAL EXAM:     GENERAL APPEARANCE: Well-developed, well-nourished female in no acute distress.  Patient is drowsy however wakes up to stimulus and follows commands.  HEENT: Normocephalic and atraumatic(except bruised/swollen lower lip). PERRL. Oropharynx unremarkable.  PULM: Comfortable on room air.  CV: RRR.  ABDOMEN: Soft, nontender.  EXTREMITIES: No signs of vascular compromise. Pulses present. No cyanosis, clubbing or edema.  SKIN: Clear; no rashes, lesions or skin breaks in exposed areas.      NEURO:   MENTAL STATUS: Patient drowsy however wakes up to stimulus and oriented to time, place, and person. Affect labile.  CRANIAL NERVES II-XII: Pupils equal, round and reactive to light. Extraocular movements full and intact. No facial asymmetry.  MOTOR: Normal bulk. Tone normal and symmetrical throughout.  No abnormal movements. No tremor.   Strength 5/5 in bilateral upper extremities and 4/5 in bilateral lower extremities.  REFLEXES: DTRs 2+; normal and symmetric throughout.   SENSATION: Sensation grossly intact to fine touch.  COORDINATION:  Could not assess  STATION: Romberg deferred.  GAIT: Deferred.    CURRENT SCHEDULED MEDICATIONS:   furosemide (LASIX) injection  60 mg Intravenous Q12H    insulin detemir U-100  10 Units Subcutaneous Daily    levetiracetam IV  500 mg Intravenous Q12H    mupirocin   Nasal BID    pantoprazole  40 mg Intravenous Daily     CURRENT  INFUSIONS:   niCARdipine 6 mg/hr (06/02/22 0939)     DATA:  Recent Labs   Lab 05/28/22 2038 05/29/22 0241 05/30/22 0655 05/31/22 0333 06/01/22 0322 06/02/22  0402    141 135* 135* 135* 135*   K 3.5 3.7 3.1* 3.5 3.8 3.8    105 101 102 104 103   CO2 25 24 24 21* 20* 23   BUN 46* 46* 45* 45* 49* 49*   CREATININE 3.9* 3.7* 3.9* 4.3* 4.9* 5.0*   GLU 48* 108 184* 185* 227* 230*   CALCIUM 8.9 8.6* 8.2* 7.8* 7.6* 7.9*   PHOS  --  3.9 3.8 4.1 4.1 4.5   MG  --  2.0 1.9 1.9 1.9 2.0   AST 27 34 22 21 16 14   ALT 23 25 20 20 17 17   AMMONIA 25  --   --   --   --   --      Recent Labs   Lab 05/28/22 2038 05/29/22 0241 05/30/22 0655 05/31/22 0333 06/01/22 0322 06/02/22  0402   WBC 6.88 10.46 9.07 7.98 8.22 8.56   HGB 12.2 11.9* 12.2 10.8* 9.9* 10.0*   HCT 33.9* 34.4* 34.0* 30.6* 28.5* 27.3*   * 107* 140* 131* 130* 132*     No results found for: PROTEINCSF, GLUCCSF, WBCCSF, RBCCSF  Hemoglobin A1C   Date Value Ref Range Status   05/15/2022 5.8 (H) 4.7 - 5.6 % Final   03/06/2022 5.3 4.5 - 5.7 % Final   01/26/2022 6.8 (H) 4.7 - 5.6 % Final   04/24/2021 8.5 (H) 4.0 - 5.6 % Final     Comment:     ADA Screening Guidelines:  5.7-6.4%  Consistent with prediabetes  >or=6.5%  Consistent with diabetes    High levels of fetal hemoglobin interfere with the HbA1C  assay. Heterozygous hemoglobin variants (HbS, HgC, etc)do  not significantly interfere with this assay.   However, presence of multiple variants may affect accuracy.     09/10/2020 11.6 (H) 4.0 - 5.6 % Final     Comment:     ADA Screening Guidelines:  5.7-6.4%  Consistent with prediabetes  >or=6.5%  Consistent with diabetes  High levels of fetal hemoglobin interfere with the HbA1C  assay. Heterozygous hemoglobin variants (HbS, HgC, etc)do  not significantly interfere with this assay.   However, presence of multiple variants may affect accuracy.     02/27/2020 10.1 (H) 4.0 - 5.6 % Final     Comment:     ADA Screening Guidelines:  5.7-6.4%  Consistent with  prediabetes  >or=6.5%  Consistent with diabetes  High levels of fetal hemoglobin interfere with the HbA1C  assay. Heterozygous hemoglobin variants (HbS, HgC, etc)do  not significantly interfere with this assay.   However, presence of multiple variants may affect accuracy.              I have personally reviewed and interpreted the pertinent imaging and lab results.  Imaging Results          CT Head Without Contrast (Final result)  Result time 05/28/22 21:10:20    Final result by Gianluca Juarez MD (05/28/22 21:10:20)                 Impression:      Negative CT of the brain for hemorrhage mass or infarction.    Small area of high density posterior to the trapezius muscle.  Correlate for posterior neck skull junction soft tissue hematoma.      Electronically signed by: Gianluca Juarez  Date:    05/28/2022  Time:    21:10             Narrative:    EXAMINATION:  CT HEAD WITHOUT CONTRAST    CLINICAL HISTORY:  Seizure, new-onset, no history of trauma;    TECHNIQUE:  Low dose axial images were obtained through the head.  Coronal and sagittal reformations were also performed. Contrast was not administered.    COMPARISON:  None.    FINDINGS:  There is a 2.2 x 1.1 cm area of high density in the subcutaneous tissues posterior to the trapezius muscle at the level of the internal occipital protuberance.  The remainder of the scalp and calvarium appear unremarkable.    The brain parenchyma appears normal in attenuation with normal gray-white matter differentiation and no mass effect or pathologic fluid collection.  There is no ventriculomegaly.    Mild mucosal thickening in the left maxillary sinus is present.  No air-fluid levels are seen.  The orbits and orbital contents appear unremarkable.                                        ASSESSMENT AND PLAN:    Posterior reversible encephalopathy syndrome  Seizure  Hypoglycemia  Hypertension     Workup:   CT head:  No acute intracranial abnormality  MRI brain:  Bilateral symmetric  FLAIR hyperintensity in occipital lobes.  DWI images did not reveal any infarction.Findings concerning for posterior reversible encephalopathy syndrome  EEG:  Severe encephalopathy without epileptiform activity or seizures     Plan:  · Etiology of seizures likely secondary to PRES and hypoglycemia.  · Given MRI findings of posterior reversible encephalopathy syndrome at high risk of recurrent seizures, patient was started on Keppra maintenance dose of 500 b.i.d..  · Strict control blood pressure less than 140 systolic.  Patient currently on nicardipine drip.  Titrate to goal blood pressure.  Recommend to start p.o. antihypertensives and wean off nicardipine.  · After weaning off nicardipine, patient can be moved out of the ICU.  · Seizure precautions.  · Neuro checks per unit protocol.  · Physical and Occupational therapy evaluate and treat.  · Avoid medications that lower seizure threshold.  · Repeat MRI in 3 months to look for resolution of PRES changes  · Will continue to follow     Seizure education.       No driving for now, no swimming alone, no climbing high areas, no operation of heavy machinery or working with high risk electricity equipment.        Patient and/or next of kin informed.  Follow up Neurology in 2 weeks. Call [phone] to make an appointment.        Thank you kindly for including us in the care of this patient. Please do not hesitate to contact us with any questions.    Critical Care:  38 minutes of critical care time has been spent evaluating with the patient. Time includes chart review not limited to diagnostic imaging, labs, and vitals, patient assessment, discussion with family and nursing, current order evaluations, and new order entries.      Neptali Marley MD  Neurology/vascular Neurology  Date of Service: 06/02/2022  10:35 AM    Please note: This note was transcribed using voice recognition software. Because of this technology there are often uinintended grammatical, spelling,  and other transcription errors. Please disregard these errors.

## 2022-06-02 NOTE — PLAN OF CARE
Problem: Physical Therapy  Goal: Physical Therapy Goal  Description: Goals to be met by: 2022     Patient will increase functional independence with mobility by performin. Supine to sit with MInimal Assistance  2. Sit to stand transfer with Minimal Assistance  3. Bed to chair transfer with Minimal Assistance using Rolling Walker  4. Gait  x 250 feet with Minimal Assistance using Rolling Walker.   5. Lower extremity exercise program x20 reps  Outcome: Ongoing, Progressing     Pt performed bed mobility (rolling left, supine to sit, scooting) with mod to min assist. Sat EOB approx. 15min. Performed sit to stand transfer with min assist. Ambulated approx. 2 steps to reach bedside chair. Recommend HH.

## 2022-06-03 PROBLEM — I16.1 HYPERTENSIVE EMERGENCY: Status: ACTIVE | Noted: 2022-06-01

## 2022-06-03 LAB
ALBUMIN SERPL BCP-MCNC: 1.8 G/DL (ref 3.5–5.2)
ALP SERPL-CCNC: 62 U/L (ref 55–135)
ALT SERPL W/O P-5'-P-CCNC: 17 U/L (ref 10–44)
ANION GAP SERPL CALC-SCNC: 13 MMOL/L (ref 8–16)
AST SERPL-CCNC: 19 U/L (ref 10–40)
BASOPHILS # BLD AUTO: 0.03 K/UL (ref 0–0.2)
BASOPHILS NFR BLD: 0.3 % (ref 0–1.9)
BILIRUB SERPL-MCNC: 0.6 MG/DL (ref 0.1–1)
BUN SERPL-MCNC: 50 MG/DL (ref 6–20)
CALCIUM SERPL-MCNC: 7.8 MG/DL (ref 8.7–10.5)
CHLORIDE SERPL-SCNC: 103 MMOL/L (ref 95–110)
CO2 SERPL-SCNC: 19 MMOL/L (ref 23–29)
CREAT SERPL-MCNC: 4.7 MG/DL (ref 0.5–1.4)
DIFFERENTIAL METHOD: ABNORMAL
EOSINOPHIL # BLD AUTO: 0.2 K/UL (ref 0–0.5)
EOSINOPHIL NFR BLD: 1.9 % (ref 0–8)
ERYTHROCYTE [DISTWIDTH] IN BLOOD BY AUTOMATED COUNT: 12.9 % (ref 11.5–14.5)
EST. GFR  (AFRICAN AMERICAN): 13 ML/MIN/1.73 M^2
EST. GFR  (NON AFRICAN AMERICAN): 11 ML/MIN/1.73 M^2
GLUCOSE SERPL-MCNC: 190 MG/DL (ref 70–110)
HCT VFR BLD AUTO: 26 % (ref 37–48.5)
HGB BLD-MCNC: 9.6 G/DL (ref 12–16)
IMM GRANULOCYTES # BLD AUTO: 0.02 K/UL (ref 0–0.04)
IMM GRANULOCYTES NFR BLD AUTO: 0.2 % (ref 0–0.5)
LYMPHOCYTES # BLD AUTO: 1.5 K/UL (ref 1–4.8)
LYMPHOCYTES NFR BLD: 14.6 % (ref 18–48)
MAGNESIUM SERPL-MCNC: 2 MG/DL (ref 1.6–2.6)
MCH RBC QN AUTO: 29.4 PG (ref 27–31)
MCHC RBC AUTO-ENTMCNC: 36.9 G/DL (ref 32–36)
MCV RBC AUTO: 80 FL (ref 82–98)
MONOCYTES # BLD AUTO: 1.2 K/UL (ref 0.3–1)
MONOCYTES NFR BLD: 12 % (ref 4–15)
NEUTROPHILS # BLD AUTO: 7.1 K/UL (ref 1.8–7.7)
NEUTROPHILS NFR BLD: 71 % (ref 38–73)
NRBC BLD-RTO: 0 /100 WBC
PHOSPHATE SERPL-MCNC: 4.4 MG/DL (ref 2.7–4.5)
PLATELET # BLD AUTO: 152 K/UL (ref 150–450)
PMV BLD AUTO: 10.6 FL (ref 9.2–12.9)
POCT GLUCOSE: 108 MG/DL (ref 70–110)
POCT GLUCOSE: 115 MG/DL (ref 70–110)
POCT GLUCOSE: 136 MG/DL (ref 70–110)
POCT GLUCOSE: 251 MG/DL (ref 70–110)
POCT GLUCOSE: 59 MG/DL (ref 70–110)
POTASSIUM SERPL-SCNC: 3.8 MMOL/L (ref 3.5–5.1)
PROT SERPL-MCNC: 4.9 G/DL (ref 6–8.4)
RBC # BLD AUTO: 3.26 M/UL (ref 4–5.4)
SODIUM SERPL-SCNC: 135 MMOL/L (ref 136–145)
WBC # BLD AUTO: 9.93 K/UL (ref 3.9–12.7)

## 2022-06-03 PROCEDURE — 76937 US GUIDE VASCULAR ACCESS: CPT

## 2022-06-03 PROCEDURE — 83735 ASSAY OF MAGNESIUM: CPT | Performed by: STUDENT IN AN ORGANIZED HEALTH CARE EDUCATION/TRAINING PROGRAM

## 2022-06-03 PROCEDURE — 84100 ASSAY OF PHOSPHORUS: CPT | Performed by: STUDENT IN AN ORGANIZED HEALTH CARE EDUCATION/TRAINING PROGRAM

## 2022-06-03 PROCEDURE — 63600175 PHARM REV CODE 636 W HCPCS: Performed by: STUDENT IN AN ORGANIZED HEALTH CARE EDUCATION/TRAINING PROGRAM

## 2022-06-03 PROCEDURE — 97530 THERAPEUTIC ACTIVITIES: CPT

## 2022-06-03 PROCEDURE — C9399 UNCLASSIFIED DRUGS OR BIOLOG: HCPCS | Performed by: STUDENT IN AN ORGANIZED HEALTH CARE EDUCATION/TRAINING PROGRAM

## 2022-06-03 PROCEDURE — 99900035 HC TECH TIME PER 15 MIN (STAT)

## 2022-06-03 PROCEDURE — 94761 N-INVAS EAR/PLS OXIMETRY MLT: CPT

## 2022-06-03 PROCEDURE — C1751 CATH, INF, PER/CENT/MIDLINE: HCPCS

## 2022-06-03 PROCEDURE — 20000000 HC ICU ROOM

## 2022-06-03 PROCEDURE — 80053 COMPREHEN METABOLIC PANEL: CPT | Performed by: STUDENT IN AN ORGANIZED HEALTH CARE EDUCATION/TRAINING PROGRAM

## 2022-06-03 PROCEDURE — 36410 VNPNXR 3YR/> PHY/QHP DX/THER: CPT

## 2022-06-03 PROCEDURE — 97116 GAIT TRAINING THERAPY: CPT

## 2022-06-03 PROCEDURE — C9113 INJ PANTOPRAZOLE SODIUM, VIA: HCPCS | Performed by: STUDENT IN AN ORGANIZED HEALTH CARE EDUCATION/TRAINING PROGRAM

## 2022-06-03 PROCEDURE — 63600175 PHARM REV CODE 636 W HCPCS: Performed by: INTERNAL MEDICINE

## 2022-06-03 PROCEDURE — A4216 STERILE WATER/SALINE, 10 ML: HCPCS | Performed by: NURSE PRACTITIONER

## 2022-06-03 PROCEDURE — 25000003 PHARM REV CODE 250: Performed by: HOSPITALIST

## 2022-06-03 PROCEDURE — 85025 COMPLETE CBC W/AUTO DIFF WBC: CPT | Performed by: STUDENT IN AN ORGANIZED HEALTH CARE EDUCATION/TRAINING PROGRAM

## 2022-06-03 PROCEDURE — 25000003 PHARM REV CODE 250: Performed by: NURSE PRACTITIONER

## 2022-06-03 PROCEDURE — 25000003 PHARM REV CODE 250: Performed by: STUDENT IN AN ORGANIZED HEALTH CARE EDUCATION/TRAINING PROGRAM

## 2022-06-03 RX ORDER — METOPROLOL TARTRATE 50 MG/1
50 TABLET ORAL 2 TIMES DAILY
Status: DISCONTINUED | OUTPATIENT
Start: 2022-06-03 | End: 2022-06-04

## 2022-06-03 RX ORDER — HYDROCHLOROTHIAZIDE 25 MG/1
25 TABLET ORAL DAILY
Status: DISCONTINUED | OUTPATIENT
Start: 2022-06-03 | End: 2022-06-05

## 2022-06-03 RX ADMIN — INSULIN ASPART 3 UNITS: 100 INJECTION, SOLUTION INTRAVENOUS; SUBCUTANEOUS at 06:06

## 2022-06-03 RX ADMIN — NICARDIPINE HYDROCHLORIDE 4 MG/HR: 0.2 INJECTION, SOLUTION INTRAVENOUS at 04:06

## 2022-06-03 RX ADMIN — METOPROLOL TARTRATE 50 MG: 50 TABLET, FILM COATED ORAL at 09:06

## 2022-06-03 RX ADMIN — HYDROCHLOROTHIAZIDE 25 MG: 25 TABLET ORAL at 12:06

## 2022-06-03 RX ADMIN — Medication 10 ML: at 11:06

## 2022-06-03 RX ADMIN — LEVETIRACETAM 500 MG: 100 INJECTION INTRAVENOUS at 09:06

## 2022-06-03 RX ADMIN — FUROSEMIDE 60 MG: 10 INJECTION, SOLUTION INTRAVENOUS at 11:06

## 2022-06-03 RX ADMIN — Medication 16 G: at 03:06

## 2022-06-03 RX ADMIN — Medication 20 ML: at 09:06

## 2022-06-03 RX ADMIN — NICARDIPINE HYDROCHLORIDE 4 MG/HR: 0.2 INJECTION, SOLUTION INTRAVENOUS at 06:06

## 2022-06-03 RX ADMIN — INSULIN DETEMIR 10 UNITS: 100 INJECTION, SOLUTION SUBCUTANEOUS at 09:06

## 2022-06-03 RX ADMIN — ACETAMINOPHEN 1000 MG: 500 TABLET ORAL at 12:06

## 2022-06-03 RX ADMIN — FUROSEMIDE 60 MG: 10 INJECTION, SOLUTION INTRAVENOUS at 09:06

## 2022-06-03 RX ADMIN — ACETAMINOPHEN 1000 MG: 500 TABLET ORAL at 06:06

## 2022-06-03 RX ADMIN — METOPROLOL TARTRATE 50 MG: 50 TABLET, FILM COATED ORAL at 12:06

## 2022-06-03 RX ADMIN — PANTOPRAZOLE SODIUM 40 MG: 40 INJECTION, POWDER, LYOPHILIZED, FOR SOLUTION INTRAVENOUS at 09:06

## 2022-06-03 NOTE — CONSULTS
18 Gx 10cm PowerGlide Midline placed to pts right cephalic vein with the use of ultrasound guidance by Yasmani.    Ultrasound guidance: yes  Vessel Caliber: large and patent, compressibility normal  Needle advanced into vessel with real time Ultrasound guidance.  Guidewire confirmed in vessel.  Image recorded and saved.  Sterile sheath used.  Sterile dressing applied  Arm circumference- 29cm  Dressing dated   Education provided to patient re: proper maintenance of line- pt verbalized understanding  Limb alert applied.

## 2022-06-03 NOTE — PROGRESS NOTES
Nephrology Consult Note        Patient Name: Tabby Howard  MRN: [id number]    Patient Class: IP- Inpatient   Admission Date: 2022  Length of Stay: 6 days  Date of Service: 6/3/2022    Attending Physician: Max Toledo MD  Primary Care Provider: Primary Doctor No    Reason for Consult: ckd4/acidosis/hyponatremia/anemia/htn/edema    SUBJECTIVE:     HPI: 32F with anemia, CKD, type 2 diabetes, gallstones, CKD 4, chronic diastolic heart failure, gastroparesis admitted on  for seizure and hypoglycemia. She has been taking her insulin but not able to hold any food down due to nausea and vomiting. She was treated several times in the ER for hypoglycemia, but had a witnessed clonic-tonic seizure for about 1 minute. Received Ativan and Keppra. Lisnoprill was topped today after 2 doses, probably over concern for worsening sCr. Amodipine and Clonidine was added in addition to nicardipine gtt. UO is low as documented and she is net positive since admission. Renal is consulted for co-management.     VSS, no new complains. UO still low, BP is up - will increase diuretics to 60 mg BID.  6/3 VSS, keep diuretic BID and titrate Nicardipine to MAP > 60, let BP ride a little higher to help diuresis. sCr is up but stable. DO not add oral BP meds yet as I am attempting diuresis and too much BP control can cause KANWAL.    Past Medical History:   Diagnosis Date    CKD (chronic kidney disease), stage IV 2022    Diabetes mellitus due to underlying condition with unspecified complications 2022    Gastroparesis 2022    Heart failure with preserved ejection fraction 2022    EF 55% on 3/22    History of gastroesophageal reflux (GERD)     History of supraventricular tachycardia     Sickle cell trait 2022    Type 2 diabetes mellitus      Past Surgical History:   Procedure Laterality Date     SECTION      x 3    ESOPHAGOGASTRODUODENOSCOPY N/A 10/18/2019    Procedure: ESOPHAGOGASTRODUODENOSCOPY  (EGD);  Surgeon: Gianluca Mendez MD;  Location[facility];  Service: Endoscopy;  Laterality: N/A;     Family History   Problem Relation Age of Onset    Diabetes Mother     Diabetes Father      Social History     Tobacco Use    Smoking status: Never Smoker    Smokeless tobacco: Never Used   Substance Use Topics    Alcohol use: No    Drug use: No       Review of patient's allergies indicates:   Allergen Reactions    Penicillins Hives       Outpatient meds:  No current facility-administered medications on file prior to encounter.     Current Outpatient Medications on File Prior to Encounter   Medication Sig Dispense Refill    acetaminophen (TYLENOL) 325 MG tablet Take 2 tablets (650 mg total) by mouth every 6 (six) hours as needed for Pain or Temperature greater than (100.3). 30 tablet 0    blood-glucose meter kit Use as instructed 1 each 0    cephALEXin (KEFLEX) 500 MG capsule Take 500 mg by mouth 4 (four) times daily.      doxycycline (VIBRAMYCIN) 100 MG Cap Take 100 mg by mouth 2 (two) times daily.      ibuprofen (ADVIL,MOTRIN) 600 MG tablet Take 1 tablet (600 mg total) by mouth every 6 (six) hours as needed for Pain. 20 tablet 0    insulin (BASAGLAR KWIKPEN U-100 INSULIN) glargine 100 units/mL (3mL) SubQ pen Inject 28 Units into the skin every evening. 25.2 mL 3    insulin glulisine U-100 (APIDRA U-100 INSULIN) 100 unit/mL injection Inject 8 Units into the skin 3 (three) times daily before meals. 21.6 mL 3    metoclopramide HCl (REGLAN) 10 MG tablet Take 1 tablet (10 mg total) by mouth every 6 (six) hours as needed (headache, nausea or vomiting). 30 tablet 0    ondansetron (ZOFRAN) 4 MG tablet Take 1 tablet (4 mg total) by mouth every 6 (six) hours as needed for Nausea. 30 tablet 0    ondansetron (ZOFRAN-ODT) 4 MG TbDL Take 1 tablet (4 mg total) by mouth every 6 (six) hours as needed (nausea or vomiting). 12 tablet 0    oxyCODONE (ROXICODONE) 5 MG immediate release tablet Take 1 tablet (5 mg  total) by mouth every 6 (six) hours as needed for Pain. 20 tablet 0    pantoprazole (PROTONIX) 40 MG tablet Take 1 tablet (40 mg total) by mouth once daily. 30 tablet 3    polyethylene glycol (GLYCOLAX) 17 gram PwPk Take 17 g by mouth once daily. 30 each 1    promethazine (PHENERGAN) 25 MG suppository Place 1 suppository (25 mg total) rectally every 6 (six) hours as needed for Nausea (For severe nausea and vomiting not improved with oral medications). 10 suppository 0    senna-docusate 8.6-50 mg (SENNA WITH DOCUSATE SODIUM) 8.6-50 mg per tablet Take 1 tablet by mouth daily as needed for Constipation. 30 tablet 1       Scheduled meds:   furosemide (LASIX) injection  60 mg Intravenous Q12H    insulin detemir U-100  10 Units Subcutaneous Daily    levetiracetam IV  500 mg Intravenous Q12H    pantoprazole  40 mg Intravenous Daily       Infusions:   niCARdipine 4 mg/hr (06/03/22 0630)       PRN meds:  acetaminophen, albuterol-ipratropium, aluminum-magnesium hydroxide-simethicone, dextrose 10%, dextrose 10%, diphenhydrAMINE, EPINEPHrine, glucagon (human recombinant), glucose, glucose, hydrALAZINE, insulin aspart U-100, magnesium oxide, magnesium oxide, melatonin, naloxone, ondansetron, potassium bicarbonate, potassium bicarbonate, potassium bicarbonate, potassium, sodium phosphates, potassium, sodium phosphates, potassium, sodium phosphates, simethicone, sodium chloride 0.9%    Review of Systems:    OBJECTIVE:     Vital Signs and IO (Last 24H):  Temp:  [98.5 °F (36.9 °C)-99.9 °F (37.7 °C)]   Pulse:  []   Resp:  [5-30]   BP: (124-170)/()   SpO2:  [88 %-99 %]   I/O last 3 completed shifts:  In: 1894.7 [P.O.:1050; I.V.:844.7]  Out: 1048 [Urine:1048]    Wt Readings from Last 5 Encounters:   06/03/22 80.4 kg (177 lb 4 oz)   05/06/22 70.3 kg (155 lb)   04/11/22 65.8 kg (145 lb)   12/20/21 59.9 kg (132 lb)   05/04/21 65.6 kg (144 lb 11.7 oz)     Physical Exam:  Physical Exam  Constitutional:       Appearance:  She is well-developed. She is not diaphoretic.   HENT:      Head: Normocephalic and atraumatic.   Eyes:      General: No scleral icterus.     Pupils: Pupils are equal, round, and reactive to light.   Cardiovascular:      Rate and Rhythm: Normal rate and regular rhythm.   Pulmonary:      Effort: Pulmonary effort is normal. No respiratory distress.      Breath sounds: No stridor.   Abdominal:      General: There is no distension.      Palpations: Abdomen is soft.   Musculoskeletal:         General: Swelling present. No deformity. Normal range of motion.      Cervical back: Neck supple.   Skin:     General: Skin is warm and dry.      Findings: No erythema or rash.   Neurological:      Mental Status: She is alert and oriented to person, place, and time.      Cranial Nerves: No cranial nerve deficit.   Psychiatric:         Behavior: Behavior normal.         Body mass index is 32.42 kg/m².    Laboratory:  Recent Labs   Lab 06/01/22 0322 06/02/22  0402 06/03/22  0344   * 135* 135*   K 3.8 3.8 3.8    103 103   CO2 20* 23 19*   BUN 49* 49* 50*   CREATININE 4.9* 5.0* 4.7*   ESTGFRAFRICA 13* 12* 13*   EGFRNONAA 11* 11* 11*   * 230* 190*       Recent Labs   Lab 06/01/22 0322 06/02/22  0402 06/03/22  0344   CALCIUM 7.6* 7.9* 7.8*   ALBUMIN 1.9* 1.9* 1.8*   PHOS 4.1 4.5 4.4   MG 1.9 2.0 2.0             Recent Labs   Lab 06/01/22  0041 06/01/22  0549 06/01/22  1156 06/01/22  1740 06/01/22  2206 06/02/22  0517 06/02/22  1211 06/02/22  1916 06/02/22  2139 06/03/22  0626   POCTGLUCOSE 325* 204* 192* 256* 261* 243* 200* 134* 182* 251*       Recent Labs   Lab 01/26/22  0240 03/06/22  1135 05/15/22  1954   Hemoglobin A1C 6.8 H 5.3 5.8 H       Recent Labs   Lab 06/01/22  0322 06/02/22  0402 06/03/22  0344   WBC 8.22 8.56 9.93   HGB 9.9* 10.0* 9.6*   HCT 28.5* 27.3* 26.0*   * 132* 152   MCV 82 80* 80*   MCHC 34.7 36.6* 36.9*   MONO 11.3  0.9 11.4  1.0 12.0  1.2*       Recent Labs   Lab 06/01/22  0326  06/02/22  0402 06/03/22  0344   BILITOT 0.6 0.6 0.6   PROT 4.7* 4.8* 4.9*   ALBUMIN 1.9* 1.9* 1.8*   ALKPHOS 62 67 62   ALT 17 17 17   AST 16 14 19       Recent Labs   Lab 09/10/20  0355 09/10/20  0355 12/13/20  1702 04/23/21 2006 04/24/21  1259 05/28/22 2036   Color, UA Yellow  --  Red A Yellow Yellow Yellow   Appearance, UA Clear  --  Cloudy A Clear Clear Clear   pH, UA 8.0  --  6.0 6.0 6.0 7.0   Specific Gravity, UA 1.010  --  1.010 1.020 1.010 1.020   Protein, UA Negative  --  2+ A 3+ A 3+ A 3+ A   Glucose, UA 3+ A  --  3+ A 3+ A 3+ A 2+ A   Ketones, UA Trace A  --  1+ A 1+ A Trace A Negative   Urobilinogen, UA 1.0  --  1.0  --   --  Negative   Bilirubin (UA) Negative  --  Negative Negative Negative Negative   Occult Blood UA 1+ A  --  3+ A 2+ A 1+ A 2+ A   Nitrite, UA Negative  --  Positive A Negative Negative Negative   RBC, UA 2  --  >100 H 54 H 2 5 H   WBC, UA 12 H  --  2 44 H 5 1   Bacteria Many A  --  Few A Occasional Rare None   Hyaline Casts, UA  --    < > 0 0 0 0    < > = values in this interval not displayed.       Recent Labs   Lab 12/13/20  1042 04/23/21 1952 04/23/21 2006 04/11/22 2043 04/11/22  2345 04/12/22  0211   POC PH 7.457 H 7.458 H  --  7.365  --   --    POC PCO2 32.6 L 32.9 L  --  39.5  --   --    POC HCO3 23.0 L 23.3 L  --  22.6 L  --   --    POC PO2 26 LL 24 LL  --  25 L  --   --    POC SATURATED O2 53 L 47 L  --  42 L  --   --    POC BE -1 -1  --  -3  --   --    Sample VENOUS VENOUS   < > VENOUS VENOUS VENOUS    < > = values in this interval not displayed.       Microbiology Results (last 7 days)     ** No results found for the last 168 hours. **        ASSESSMENT/PLAN:     CKD stage 4  Edema due to hypoalbuminemia  Hyponatremia  Acidosis  Uncontrolled DM  No NSAIDs, ACEI/ARB, IV contrast or other nephrotoxins.  Keep MAP > 60, SBP > 100.  Dose meds for GFR < 30 ml/min.  Renal diet - low K, low phos. Limit free water intake.  Hold oral BP meds, titrate diuretic as tolerated, keep  [place] with close IO. Tolerate -160 to help diuresis, keep nicardipine drip.  Control DM.    Anemia of CKD  Hgb and HCT are acceptable. Monitor.  Will provide SHELLEY and/or IV iron PRN.    HTN  BP seem controlled.   Tolerate asymptomatic HTN up to -160.  Stop all oral meds, keep Nicardipine, increase diuretic as tolerated to deal with edema, keep [place].    Thank you for allowing us to participate in the care of your patient!   We will follow the patient and provide recommendations as needed.    Patient care time was spent personally by me on the following activities:   · Obtaining a history.  · Examination of patient.  · Providing medical care at the patients bedside.  · Developing a treatment plan with patient or surrogate and bedside caregivers.  · Ordering and reviewing laboratory studies, radiographic studies, pulse oximetry.  · Ordering and performing treatments and interventions.  · Evaluation of patient's response to treatment.  · Discussions with consultants while on the unit and immediately available to the patient.  · Re-evaluation of the patient's condition.  · Documentation in the medical record.     Mando Benitez MD    [facility]  [address] [place] [address]  [place] [address]    [phone] - tel  [phone] - fax    6/3/2022

## 2022-06-03 NOTE — NURSING
Received consult for midline. Secure chat to Nephrology MD for approval. Ok to place new midline per Dr. Benitez.

## 2022-06-03 NOTE — PT/OT/SLP PROGRESS
Physical Therapy Treatment    Patient Name:  Tabby Howard   MRN:  [id number]    Recommendations:     Discharge Recommendations:  home health PT   Discharge Equipment Recommendations: none   Barriers to discharge: None    Assessment:     Tabby Howard is a 32 y.o. female admitted with a medical diagnosis of PRES (posterior reversible encephalopathy syndrome).  She presents with the following impairments/functional limitations:  weakness, impaired endurance, impaired functional mobilty, gait instability, impaired balance, impaired cognition, decreased safety awareness, pain, edema, impaired cardiopulmonary response to activity.    Upon arrival pt is drowsy, but responds to voice and is agreeable to therapy. Pt's father arrived shortly after the initiation of treatment. Father encouraged pt to participate in therapy. Pt performed safe bed mobility (rolling left and supine to sit) and transfers (sit to stand) with min to CGA. Pt ambulated approx. 100 feet with RW, min assist, IV pole and chair following behind. Pt responded well to ambulation and returned to sitting in bedside chair upon completion. Pt would benefit from  PT upon discharge.     Rehab Prognosis: Fair; patient would benefit from acute skilled PT services to address these deficits and reach maximum level of function.    Recent Surgery: * No surgery found *      Plan:     During this hospitalization, patient to be seen 6 x/week to address the identified rehab impairments via gait training, therapeutic activities, therapeutic exercises and progress toward the following goals:    · Plan of Care Expires:  06/30/22    Subjective   Pt states that she is happy to see her father and is ready to see her children.   Chief Complaint: none stated   Patient/Family Comments/goals: none stated   Pain/Comfort:  · Pain Rating 1:  (Recent replacement of IV in R arm causes slight discomfort)      Objective:     Communicated with nurse prior to session.  Patient found HOB  elevated with   upon PT entry to room.     General Precautions: Standard, airborne, diabetic, seizure   Orthopedic Precautions:N/A   Braces:    Respiratory Status: Room air     Functional Mobility:  · Bed Mobility:     · Supine to Sit: contact guard assistance and minimum assistance  · Transfers:     · Sit to Stand:  contact guard assistance and minimum assistance with rolling walker  · Gait: Pt ambulated approx. 100 feet with RW, min assist, IV pole, and chair following behind.       AM-PAC 6 CLICK MOBILITY  Turning over in bed (including adjusting bedclothes, sheets and blankets)?: 3  Sitting down on and standing up from a chair with arms (e.g., wheelchair, bedside commode, etc.): 3  Moving from lying on back to sitting on the side of the bed?: 3  Moving to and from a bed to a chair (including a wheelchair)?: 3  Need to walk in hospital room?: 3  Climbing 3-5 steps with a railing?: 1  Basic Mobility Total Score: 16       Therapeutic Activities and Exercises:   PT informed pt of detrimental effects of prolonged bed rest and encouraged pt to continue performing ankle pumps and sitting up in bedside chair to assist with improvement of BLE strength and functional mobility.     Patient left up in chair with all lines intact, call button in reach and chair alarm on..    GOALS:   Multidisciplinary Problems     Physical Therapy Goals        Problem: Physical Therapy    Goal Priority Disciplines Outcome Goal Variances Interventions   Physical Therapy Goal     PT, PT/OT Ongoing, Progressing     Description: Goals to be met by: 2022     Patient will increase functional independence with mobility by performin. Supine to sit with MInimal Assistance  2. Sit to stand transfer with Minimal Assistance  3. Bed to chair transfer with Minimal Assistance using Rolling Walker  4. Gait  x 250 feet with Minimal Assistance using Rolling Walker.   5. Lower extremity exercise program x20 reps                   Time Tracking:      PT Received On: 06/03/22  PT Start Time: 1001     PT Stop Time: 1026  PT Total Time (min): 25 min     Billable Minutes: Gait Training 15 and Therapeutic Activity 10    Treatment Type: Treatment  PT/PTA: PT     PTA Visit Number: 0     06/03/2022

## 2022-06-03 NOTE — CARE UPDATE
06/03/22 0648   Patient Assessment/Suction   Level of Consciousness (AVPU) alert   Respiratory Effort Normal;Unlabored   Expansion/Accessory Muscles/Retractions expansion symmetric;no retractions;no use of accessory muscles   All Lung Fields Breath Sounds clear   Rhythm/Pattern, Respiratory unlabored   PRE-TX-O2   O2 Device (Oxygen Therapy) room air   Pulse Oximetry Type Continuous   $ Pulse Oximetry - Multiple Charge Pulse Oximetry - Multiple   Aerosol Therapy   $ Aerosol Therapy Charges PRN treatment not required   Respiratory Treatment Status (SVN) PRN treatment not required

## 2022-06-03 NOTE — NURSING
Midline IV site leaking. Pt stated tender to touch. Unable to pull blood. Notify Jessica Salazar by Angelina Nelson RN. Cardene drip stopped at this time. With order for US right arm stat. [facility] House Supervisor notified for stat Midline/Picc line consult.  Continue to monitor. US DC by WILL Salazar NP.

## 2022-06-03 NOTE — PLAN OF CARE
Problem: Physical Therapy  Goal: Physical Therapy Goal  Description: Goals to be met by: 2022     Patient will increase functional independence with mobility by performin. Supine to sit with MInimal Assistance  2. Sit to stand transfer with Minimal Assistance  3. Bed to chair transfer with Minimal Assistance using Rolling Walker  4. Gait  x 250 feet with Minimal Assistance using Rolling Walker.   5. Lower extremity exercise program x20 reps  Outcome: Ongoing, Progressing     Pt ambulated approx 50ft x2 with RW, min assist, IV pole, and chair following behind. Pt performed safe bed mobility (supine to sit) and transfers (sit to stand) with min to CGA.

## 2022-06-03 NOTE — PLAN OF CARE
Problem: Adult Inpatient Plan of Care  Goal: Plan of Care Review  Outcome: Ongoing, Progressing     Problem: Adult Inpatient Plan of Care  Goal: Patient-Specific Goal (Individualized)  Outcome: Ongoing, Progressing   On cardene drip at 4mg/hour. BP monitored. Slept since 2 am. Abdomen observed to be more distended. Wt this am 80.4kg. Still generalized edema noted. Urine output for the shift 508 ml. Awaken at 0630 for accucheck. Pt requested her back rubbed. Was done. Complaint of back pain and wants some water. Was in tears. Stated back pain is 10/10. Tylenol given. Had water also. Was on smartphone watching tiktok. Yawning. Sinus tach 110. /72 (99). Safety/fall prevention maintain.Report to incoming RN at bedside.

## 2022-06-03 NOTE — NURSING
Angelina called GRETA . Maxwell from ED able to start a twin cath to left AC. via ultrasound machine. 0010 Cardene drip restarted at 4mg/hour.House Supervisor notified. Will notify in house PICC team in am.

## 2022-06-03 NOTE — PLAN OF CARE
"  Problem: Sleep Disturbance (Delirium)  Goal: Improved Sleep  Outcome: Ongoing, Progressing     Problem: Seizure, Active Management  Goal: Absence of Seizure/Seizure-Related Injury  Outcome: Met     Problem: Oral Intake Inadequate (Acute Kidney Injury/Impairment)  Goal: Optimal Nutrition Intake  Outcome: Ongoing, Progressing     Problem: Renal Function Impairment (Acute Kidney Injury/Impairment)  Goal: Effective Renal Function  Outcome: Ongoing, Progressing     Problem: Hypertension Acute  Goal: Blood Pressure Within Desired Range  Outcome: Ongoing, Progressing   Pt got up in bedside chair for about 45 minutes before wanting to go back to bed. Encouraged to wait for an hour. Pt's father was here during that time. She was able to stay up for almost 90 minutes. Pt rolls around in bed freely. C/o back pain. Tylenol given. States "some" relief. Pt up to toilet for loose soft formed dark brown large stool. Blood sugar 59. Checked after pt felt she was getting low. She did not eat anything prior to that. Ate pudding and grapes. Glucose tablets given as prn order. Pt attempting to eat tablets. Ate all pudding and grapes.   "

## 2022-06-03 NOTE — CHAPLAIN
Visited pt and she welcomed prayer; she was a little teary, but bruce filled. Brought her devotional and Bible; very grateful. Lord, in your mercy.

## 2022-06-03 NOTE — PHYSICIAN QUERY
PT Name: Tabby Howard  QUERY WITHDRAWN: Found Hypertensive Emergency documented on Hospital Medicine progress 6/3/22  MR #: [id number]     DOCUMENTATION CLARIFICATION     CDS/: Lorraine GONZALEZ, RN               Contact information: [email or web address]    This form is a permanent document in the medical record.     Query Date: Penelope 3, 2022    By submitting this query, we are merely seeking further clarification of documentation to reflect the severity of illness of your patient. Please utilize your independent clinical judgment when addressing the question(s) below.                                                                       The Medical Record contains the following:   Indicators   Supporting Clinical Findings Location in Medical Record   x Hypertension or hypertensive documented  Elevated blood pressure reading  Patient likely with undiagnosed HTN contributing to CKD.     Uncontrolled DM  Hospital Medicine progress 6/1/22    Nephrology consult 6/1/22   x Acute/Chronic condition(s)  PRES (posterior reversible encephalopathy syndrome)  -Monitor in ICU  -Seizure precautions  -PRN IV Ativan  -Cardene infusion for BP control; currently weaning  Hospital Medicine progress 6/1/22   x Vital signs  BP = 123//95   BP = 123//77    BP = 124//92    BP = 125//108      Vital Signs 5/31/22   Vital Signs 6/1/22   Vital Signs 6/2/22   Vital Signs 6/3/22    Lab findings      Radiology findings     x Treatment/Medication  niCARdipine 40 mg/200 mL (0.2 mg/mL) infusion   Ordered Dose: 0-15 mg/hr Route: Intravenous  Frequency: Continuous @ 0-75 mL/hr  Admin Dose: 0-15 mg/hr      Lisnoprill was topped today after 2 doses, probably over concern for worsening sCr. Amodipine and Clonidine was added in addition to nicardipine gtt   HTN  BP seem controlled.   Tolerate asymptomatic HTN up to -160.  Stop all oral meds, keep Nicardipine, add diuretic to deal with edema, escalate as needed, keep  felix.     -Cardene infusion  -Nephrology following     MAR 5/29/22 - present         Nephrology consult 6/1/22                 Hospital Medicine progress 6/1/22   x Other  Pt remains in ICU. Cardene gtt infusing-titrated for MAP <104. D5W @ 75 infusing. BG stable throughout day. Pt able to tell me name only., intermittently follows commands. MRI and EEG today.      After completion of treatment, pt complained of increased headache  Nursing Plan of Care 5/29/22       PT/OT/SLP Progress 6/1/22     The clinical guidelines noted are only a system guideline. It does not replace the provider's clinical judgment.  Provider, please specify the diagnosis or diagnoses that correspond(s) to the above indicators:    [   ]  Hypertensive emergency - Severe hypertension (SBP>180 and/or DBP>120mmHg) with signs or  symptoms of new or worsening end-organ damage (i.e. hypertensive encephalopathy, retinal  hemorrhage, papilledema, acute kidney injury, stroke, heart attack, heart failure, kidney failure)     [   ]  Hypertensive urgency - Severe asymptomatic hypertension (SBP>180 and/or DBP>120mmHg)  without signs or symptoms of acute end-organ damage. Can have a mild headache.     [   ]  Hypertensive crisis, unspecified     [   ]  Essential (primary) hypertension     [   ]  Other cardiovascular condition (please specify): __________     [   ]  Clinically Undetermined       Please document in your progress notes daily for the duration of treatment until resolved, and include in your discharge summary.    References:    TETE Rousseau MD, PhD, & NILS Kraft MD, FACP, FCCP, FCCM, FR. (2020, June 25). Evaluation and treatment of hypertensive emergencies in adults (DELIA Amos MD, TETE Mckenzie MD, & NILS Sandoval MD, MSc, Eds.). Retrieved October 22, 2020, from  https://www."[employer]"/contents/evaluation-and-treatment-to-zbcueeikawwj-tmbovbksavc-in-adults?search=hypertensive%20emergency&source=search_result&selectedTitle=1~150&usage_type=default&display_rank=1    NILS Kraft MD, FACP, FCCP, FCCM, FRSM, & TETE Rousseau MD, PhD. (2020, October 14). Management of severe asymptomatic hypertension (hypertensive urgencies) in adults (DELIA Amos MD, TETE Mckenzie MD, & NILS Sandoval MD, MSc, Eds.). Retrieved October 22, 2020, from https://www."[employer]"/contents/management-of-severe-asymptomatic-hypertension-hypertensive-urgencies-in-adults?search=hypertensive%20urgency&source=search_result&selectedTitle=1~41&usage_type=default&display_rank=1    Form No. [id number]

## 2022-06-03 NOTE — PT/OT/SLP PROGRESS
Occupational Therapy      Patient Name:  Tabby Howard   MRN:  [id number]    Patient not seen today secondary to patient was unable to maintain alertness to participate in OT. Will follow-up 6/4/2022.    6/3/2022

## 2022-06-04 PROBLEM — D69.6 THROMBOCYTOPENIA: Status: RESOLVED | Noted: 2022-05-30 | Resolved: 2022-06-04

## 2022-06-04 PROBLEM — I16.1 HYPERTENSIVE EMERGENCY: Status: RESOLVED | Noted: 2022-06-01 | Resolved: 2022-06-04

## 2022-06-04 LAB
ALBUMIN SERPL BCP-MCNC: 1.8 G/DL (ref 3.5–5.2)
ALP SERPL-CCNC: 65 U/L (ref 55–135)
ALT SERPL W/O P-5'-P-CCNC: 14 U/L (ref 10–44)
ANION GAP SERPL CALC-SCNC: 12 MMOL/L (ref 8–16)
AST SERPL-CCNC: 24 U/L (ref 10–40)
BASOPHILS # BLD AUTO: 0.03 K/UL (ref 0–0.2)
BASOPHILS NFR BLD: 0.3 % (ref 0–1.9)
BILIRUB SERPL-MCNC: 0.7 MG/DL (ref 0.1–1)
BUN SERPL-MCNC: 51 MG/DL (ref 6–20)
CALCIUM SERPL-MCNC: 8.2 MG/DL (ref 8.7–10.5)
CHLORIDE SERPL-SCNC: 103 MMOL/L (ref 95–110)
CO2 SERPL-SCNC: 19 MMOL/L (ref 23–29)
CREAT SERPL-MCNC: 4.5 MG/DL (ref 0.5–1.4)
DIFFERENTIAL METHOD: ABNORMAL
EOSINOPHIL # BLD AUTO: 0.2 K/UL (ref 0–0.5)
EOSINOPHIL NFR BLD: 1.6 % (ref 0–8)
ERYTHROCYTE [DISTWIDTH] IN BLOOD BY AUTOMATED COUNT: 12.7 % (ref 11.5–14.5)
EST. GFR  (AFRICAN AMERICAN): 14 ML/MIN/1.73 M^2
EST. GFR  (NON AFRICAN AMERICAN): 12 ML/MIN/1.73 M^2
GLUCOSE SERPL-MCNC: 110 MG/DL (ref 70–110)
HCT VFR BLD AUTO: 27.7 % (ref 37–48.5)
HGB BLD-MCNC: 10.2 G/DL (ref 12–16)
IMM GRANULOCYTES # BLD AUTO: 0.04 K/UL (ref 0–0.04)
IMM GRANULOCYTES NFR BLD AUTO: 0.4 % (ref 0–0.5)
LYMPHOCYTES # BLD AUTO: 1.2 K/UL (ref 1–4.8)
LYMPHOCYTES NFR BLD: 12.4 % (ref 18–48)
MAGNESIUM SERPL-MCNC: 2.1 MG/DL (ref 1.6–2.6)
MCH RBC QN AUTO: 29.2 PG (ref 27–31)
MCHC RBC AUTO-ENTMCNC: 36.8 G/DL (ref 32–36)
MCV RBC AUTO: 79 FL (ref 82–98)
MONOCYTES # BLD AUTO: 0.8 K/UL (ref 0.3–1)
MONOCYTES NFR BLD: 8.1 % (ref 4–15)
NEUTROPHILS # BLD AUTO: 7.2 K/UL (ref 1.8–7.7)
NEUTROPHILS NFR BLD: 77.2 % (ref 38–73)
NRBC BLD-RTO: 0 /100 WBC
PHOSPHATE SERPL-MCNC: 4.9 MG/DL (ref 2.7–4.5)
PLATELET # BLD AUTO: 190 K/UL (ref 150–450)
PMV BLD AUTO: 10.4 FL (ref 9.2–12.9)
POCT GLUCOSE: 130 MG/DL (ref 70–110)
POCT GLUCOSE: 134 MG/DL (ref 70–110)
POCT GLUCOSE: 142 MG/DL (ref 70–110)
POCT GLUCOSE: 151 MG/DL (ref 70–110)
POTASSIUM SERPL-SCNC: 4.5 MMOL/L (ref 3.5–5.1)
PROT SERPL-MCNC: 5.4 G/DL (ref 6–8.4)
RBC # BLD AUTO: 3.49 M/UL (ref 4–5.4)
SODIUM SERPL-SCNC: 134 MMOL/L (ref 136–145)
WBC # BLD AUTO: 9.36 K/UL (ref 3.9–12.7)

## 2022-06-04 PROCEDURE — 83735 ASSAY OF MAGNESIUM: CPT | Performed by: STUDENT IN AN ORGANIZED HEALTH CARE EDUCATION/TRAINING PROGRAM

## 2022-06-04 PROCEDURE — 99900035 HC TECH TIME PER 15 MIN (STAT)

## 2022-06-04 PROCEDURE — 85025 COMPLETE CBC W/AUTO DIFF WBC: CPT | Performed by: STUDENT IN AN ORGANIZED HEALTH CARE EDUCATION/TRAINING PROGRAM

## 2022-06-04 PROCEDURE — 80053 COMPREHEN METABOLIC PANEL: CPT | Performed by: STUDENT IN AN ORGANIZED HEALTH CARE EDUCATION/TRAINING PROGRAM

## 2022-06-04 PROCEDURE — 20000000 HC ICU ROOM

## 2022-06-04 PROCEDURE — 63600175 PHARM REV CODE 636 W HCPCS: Performed by: INTERNAL MEDICINE

## 2022-06-04 PROCEDURE — 94761 N-INVAS EAR/PLS OXIMETRY MLT: CPT

## 2022-06-04 PROCEDURE — 84100 ASSAY OF PHOSPHORUS: CPT | Performed by: STUDENT IN AN ORGANIZED HEALTH CARE EDUCATION/TRAINING PROGRAM

## 2022-06-04 PROCEDURE — 27000207 HC ISOLATION

## 2022-06-04 PROCEDURE — 25000003 PHARM REV CODE 250: Performed by: HOSPITALIST

## 2022-06-04 PROCEDURE — 25000003 PHARM REV CODE 250: Performed by: NURSE PRACTITIONER

## 2022-06-04 PROCEDURE — 25000003 PHARM REV CODE 250: Performed by: STUDENT IN AN ORGANIZED HEALTH CARE EDUCATION/TRAINING PROGRAM

## 2022-06-04 PROCEDURE — 63600175 PHARM REV CODE 636 W HCPCS: Performed by: STUDENT IN AN ORGANIZED HEALTH CARE EDUCATION/TRAINING PROGRAM

## 2022-06-04 PROCEDURE — 51702 INSERT TEMP BLADDER CATH: CPT

## 2022-06-04 PROCEDURE — C9113 INJ PANTOPRAZOLE SODIUM, VIA: HCPCS | Performed by: STUDENT IN AN ORGANIZED HEALTH CARE EDUCATION/TRAINING PROGRAM

## 2022-06-04 RX ORDER — HYDRALAZINE HYDROCHLORIDE 25 MG/1
100 TABLET, FILM COATED ORAL EVERY 8 HOURS
Status: DISCONTINUED | OUTPATIENT
Start: 2022-06-04 | End: 2022-06-07 | Stop reason: HOSPADM

## 2022-06-04 RX ADMIN — Medication 9 MG: at 08:06

## 2022-06-04 RX ADMIN — HYDRALAZINE HYDROCHLORIDE 100 MG: 25 TABLET, FILM COATED ORAL at 09:06

## 2022-06-04 RX ADMIN — FUROSEMIDE 60 MG: 10 INJECTION, SOLUTION INTRAVENOUS at 08:06

## 2022-06-04 RX ADMIN — NICARDIPINE HYDROCHLORIDE 1 MG/HR: 0.2 INJECTION, SOLUTION INTRAVENOUS at 06:06

## 2022-06-04 RX ADMIN — PANTOPRAZOLE SODIUM 40 MG: 40 INJECTION, POWDER, LYOPHILIZED, FOR SOLUTION INTRAVENOUS at 08:06

## 2022-06-04 RX ADMIN — INSULIN DETEMIR 10 UNITS: 100 INJECTION, SOLUTION SUBCUTANEOUS at 09:06

## 2022-06-04 RX ADMIN — HYDRALAZINE HYDROCHLORIDE 100 MG: 25 TABLET, FILM COATED ORAL at 02:06

## 2022-06-04 RX ADMIN — HYDRALAZINE HYDROCHLORIDE 100 MG: 25 TABLET, FILM COATED ORAL at 11:06

## 2022-06-04 RX ADMIN — LEVETIRACETAM 500 MG: 100 INJECTION INTRAVENOUS at 08:06

## 2022-06-04 RX ADMIN — METOPROLOL TARTRATE 50 MG: 50 TABLET, FILM COATED ORAL at 08:06

## 2022-06-04 RX ADMIN — HYDROCHLOROTHIAZIDE 25 MG: 25 TABLET ORAL at 08:06

## 2022-06-04 NOTE — SUBJECTIVE & OBJECTIVE
Interval History:  Patient remains somewhat lethargic, but responds appropriately.  Remains on Cardene drip.  Noted anasarca.    Review of Systems   Constitutional:  Positive for unexpected weight change.   Genitourinary:  Positive for decreased urine volume.   Skin:  Positive for pallor.   Neurological:  Positive for weakness. Negative for seizures and headaches.   Psychiatric/Behavioral:  Positive for decreased concentration.    All other systems reviewed and are negative.  Objective:     Vital Signs (Most Recent):  Temp: 98.4 °F (36.9 °C) (06/03/22 1600)  Pulse: 85 (06/03/22 1936)  Resp: (!) 25 (06/03/22 1936)  BP: (!) 157/91 (06/03/22 1936)  SpO2: 97 % (06/03/22 1936)   Vital Signs (24h Range):  Temp:  [98.4 °F (36.9 °C)-98.9 °F (37.2 °C)] 98.4 °F (36.9 °C)  Pulse:  [] 85  Resp:  [6-31] 25  SpO2:  [88 %-100 %] 97 %  BP: (125-164)/() 157/91     Weight: 80.4 kg (177 lb 4 oz)  Body mass index is 32.42 kg/m².    Intake/Output Summary (Last 24 hours) at 6/3/2022 2109  Last data filed at 6/3/2022 1800  Gross per 24 hour   Intake 919.97 ml   Output 854 ml   Net 65.97 ml        Physical Exam  Vitals and nursing note reviewed.   Constitutional:       General: She is not in acute distress.     Appearance: She is well-developed. She is ill-appearing. She is not diaphoretic.      Comments: Anasarca.   HENT:      Head: Normocephalic and atraumatic.      Right Ear: External ear normal.      Left Ear: External ear normal.      Nose: Nose normal.   Eyes:      General: No scleral icterus.     Conjunctiva/sclera: Conjunctivae normal.   Neck:      Vascular: No JVD.   Cardiovascular:      Rate and Rhythm: Normal rate and regular rhythm.      Pulses: Normal pulses.      Heart sounds: Normal heart sounds. No murmur heard.    No friction rub. No gallop.   Pulmonary:      Effort: Pulmonary effort is normal. No respiratory distress.      Breath sounds: Normal breath sounds. No wheezing or rales.   Abdominal:      General:  Bowel sounds are normal. There is no distension.      Palpations: Abdomen is soft.      Tenderness: There is no abdominal tenderness. There is no guarding or rebound.   Musculoskeletal:         General: No tenderness. Normal range of motion.      Cervical back: Neck supple.      Right lower leg: No edema.      Left lower leg: No edema.   Skin:     General: Skin is warm and dry.      Coloration: Skin is not pale.      Findings: No erythema or rash.   Neurological:      Mental Status: She is oriented to person, place, and time. She is lethargic.       Significant Labs: All pertinent labs within the past 24 hours have been reviewed.    Significant Imaging: I have reviewed all pertinent imaging results/findings within the past 24 hours.

## 2022-06-04 NOTE — PLAN OF CARE
Resting intermittently. Respirations unlabored. OOB to BSC x 3. Having diarrhea stools. Specimen to be collected for C-diff.  Pelayo intact, diuresing with lasix. Moderate facial and generalized edema.   PO BP meds adjusted. Weaning Cardene drip as tolerated. BP remains labile.   Safety maintained.

## 2022-06-04 NOTE — NURSING
Had 3 diarrhea stools in the last hour. Notified Dr. Parker. Orders received to collect specimen for C-Diff.  Contact Isolation precautions initiated.

## 2022-06-04 NOTE — PROGRESS NOTES
[facility]  Progress Note  Date: 2022 10:35 AM            Patient Name: Tabby Howard   MRN: [id number]   : 1990    AGE: 32 y.o.    LOS: 7 days Hospital Day: 8  Admit date: 2022  8:17 PM         HPI per EMR: Tabby Howard is a 32 y.o. female with a history of anemia, CKD, type 2 diabetes, gallstones, CKD, chronic diastolic heart failure, and gastroparesis. presents emergency room for evaluation of seizure and hypoglycemia.  Patient has a history of diabetes and gastroparesis.  She reports that she has been taking her insulin but not able to hold any food down due to nausea and vomiting.  She was treated several times in the ER for hypoglycemia using dextrose 50%.  In the ER, patient was reported to have clonic tonic seizure activity which lasted approximately 1 minute.  Patient was given Ativan status post seizure as well as given a Keppra loading dose.     Neurology consult:  Patient was seen examined by me this morning.  She is lethargic, opens eyes to stimulus is able to tell me her name however she does not follow commands or answer any other questions.  She is able to move all extremities symmetrically.     Patient presented to the hospital with severe nausea and vomiting.  In the ER, she was reported to have a generalized tonic-clonic seizure lasting for about a minute.  She did bite her tongue during the episode.  Prior to the episode, her blood sugar was 48. She received a loading dose of Keppra.  She was admitted to the ICU for further care.    2022: No acute events overnight. Patient was seen and examined by me this morning. Neuro exam is improving.  Patient is drowsy however wakes up to stimulus and is oriented to place, year but not to month.  She is able to follow commands.    2022:  No acute events overnight.  No further seizure-like activity.  On exam, patient is drowsy however wakes up to stimulus is able to answer questions and follow  commands.    06/01/2022:  Patient was seen examined by me this morning.  No acute events overnight.  No further seizure-like activity.  She remains to be drowsy but wakes up, she is oriented to time place and person and is able to follow commands.    06/02/2022:  Patient was seen examined by me this morning.  No acute events overnight.  Remains to be on nicardipine drip with blood pressure goal less than 140 systolic    06/03/2022:  Patient was seen examined by me this morning.  She remains to be in the ICU.  Nicardipine drip being titrated for a goal blood pressure of less than 140 systolic.  Nephrology following and managing uncontrolled hypertension.    6/4/2022: Patient was seen and examined by me this morning.  No acute overnight events, no new neurological complaints.  Still on nicardipine.          Vitals:  Patient Vitals for the past 24 hrs:   BP Temp Temp src Pulse Resp SpO2 Weight   06/04/22 1615 135/84 -- -- 91 16 97 % --   06/04/22 1600 129/83 -- -- 93 15 98 % --   06/04/22 1545 118/79 -- -- 92 (!) 22 99 % --   06/04/22 1530 125/67 -- -- 93 18 99 % --   06/04/22 1515 134/70 -- -- 92 (!) 26 97 % --   06/04/22 1500 126/68 -- -- 93 19 97 % --   06/04/22 1445 (!) 151/86 -- -- 91 (!) 32 97 % --   06/04/22 1430 (!) 180/90 -- -- 91 (!) 25 97 % --   06/04/22 1428 (!) 176/94 -- -- -- -- -- --   06/04/22 1415 (!) 176/94 -- -- 89 12 97 % --   06/04/22 1400 (!) 168/90 -- -- 90 16 98 % --   06/04/22 1345 (!) 151/85 -- -- 89 16 97 % --   06/04/22 1330 (!) 151/98 -- -- 89 (!) 23 96 % --   06/04/22 1315 (!) 156/97 -- -- 89 18 97 % --   06/04/22 1300 (!) 153/94 -- -- 88 20 (!) 93 % --   06/04/22 1245 (!) 157/93 -- -- 88 20 (!) 91 % --   06/04/22 1230 (!) 147/88 -- -- 85 16 (!) 81 % --   06/04/22 1215 (!) 159/93 -- -- 87 16 (!) 91 % --   06/04/22 1200 (!) 167/98 99 °F (37.2 °C) Oral 87 16 (!) 94 % --   06/04/22 1145 (!) 170/101 -- -- 89 18 (!) 91 % --   06/04/22 1130 (!) 161/101 -- -- 87 16 (!) 94 % --   06/04/22 1129 (!)  157/101 -- -- -- -- -- --   06/04/22 1115 -- -- -- 89 16 96 % --   06/04/22 1100 (!) 159/82 -- -- 94 (!) 30 97 % --   06/04/22 1045 (!) 128/91 -- -- 92 16 98 % --   06/04/22 1030 -- -- -- 92 -- 98 % --   06/04/22 1015 -- -- -- 95 (!) 33 98 % --   06/04/22 1000 (!) 179/98 -- -- 87 (!) 35 97 % --   06/04/22 0945 (!) 173/98 -- -- 96 13 96 % --   06/04/22 0930 (!) 160/90 -- -- 96 (!) 27 (!) 88 % --   06/04/22 0915 (!) 170/89 -- -- 96 15 98 % --   06/04/22 0900 (!) 156/89 -- -- 95 16 97 % --   06/04/22 0845 (!) 152/91 -- -- 96 16 95 % --   06/04/22 0830 (!) 154/89 -- -- 96 12 95 % --   06/04/22 0815 (!) 160/98 98.9 °F (37.2 °C) Oral 97 15 98 % --   06/04/22 0800 (!) 160/81 -- -- 97 14 97 % --   06/04/22 0745 (!) 160/84 -- -- 97 16 95 % --   06/04/22 0730 (!) 179/84 -- -- 95 15 97 % --   06/04/22 0700 (!) 155/84 -- -- 92 14 (!) 91 % --   06/04/22 0645 (!) 157/75 -- -- 93 15 (!) 93 % --   06/04/22 0630 (!) 180/95 -- -- 92 (!) 4 96 % --   06/04/22 0615 (!) 173/104 -- -- 95 (!) 26 (!) 87 % --   06/04/22 0600 133/89 -- -- 93 (!) 35 97 % 81.9 kg (180 lb 8.9 oz)   06/04/22 0545 (!) 174/95 -- -- 83 16 (!) 89 % --   06/04/22 0530 (!) 162/96 -- -- 86 15 96 % --   06/04/22 0515 (!) 162/98 -- -- 86 15 96 % --   06/04/22 0500 (!) 162/95 -- -- 84 16 95 % --   06/04/22 0445 (!) 159/99 -- -- 86 15 95 % --   06/04/22 0430 (!) 157/103 -- -- 84 18 95 % --   06/04/22 0415 (!) 171/93 -- -- 85 19 96 % --   06/04/22 0400 (!) 173/100 -- -- 86 19 97 % --   06/04/22 0330 (!) 161/93 -- -- 87 16 96 % --   06/04/22 0300 (!) 161/90 -- -- 86 18 (!) 92 % --   06/04/22 0245 138/86 -- -- 86 19 (!) 92 % --   06/04/22 0233 136/84 -- -- -- -- -- --   06/04/22 0230 136/84 -- -- 86 15 (!) 91 % --   06/04/22 0215 (!) 140/86 -- -- 85 15 (!) 91 % --   06/04/22 0200 132/88 -- -- 85 16 (!) 93 % --   06/04/22 0145 130/89 -- -- 83 18 95 % --   06/04/22 0130 -- -- -- 83 15 95 % --   06/04/22 0115 -- -- -- 84 (!) 22 96 % --   06/04/22 0100 (!) 169/98 -- -- 84 16 95 %  --   06/04/22 0045 (!) 166/95 -- -- 84 15 97 % --   06/04/22 0030 (!) 165/93 -- -- 83 18 (!) 93 % --   06/04/22 0000 (!) 169/98 -- -- 81 18 97 % --   06/03/22 2345 -- 97.8 °F (36.6 °C) Axillary 81 17 96 % --   06/03/22 2340 127/75 -- -- -- -- -- --   06/03/22 2330 124/75 -- -- 82 (!) 23 (!) 92 % --   06/03/22 2315 -- -- -- 80 20 95 % --   06/03/22 2300 -- -- -- 80 17 (!) 94 % --   06/03/22 2245 (!) 163/94 -- -- 78 15 (!) 94 % --   06/03/22 2230 (!) 161/100 -- -- 78 16 (!) 92 % --   06/03/22 2215 (!) 167/96 -- -- 79 17 95 % --   06/03/22 2206 (!) 168/97 -- -- -- -- -- --   06/03/22 2200 (!) 166/104 -- -- 87 17 98 % --   06/03/22 2145 -- -- -- 90 (!) 22 95 % --   06/03/22 2140 -- -- -- 88 -- -- --   06/03/22 2130 (!) 154/94 -- -- 87 19 96 % --   06/03/22 2115 -- -- -- 87 (!) 21 98 % --   06/03/22 2100 (!) 148/76 -- -- 87 11 97 % --   06/03/22 2045 -- -- -- 87 20 97 % --   06/03/22 2030 (!) 155/96 -- -- 87 20 96 % --   06/03/22 2015 -- -- -- 87 20 96 % --   06/03/22 2000 (!) 149/97 -- -- 88 19 97 % --   06/03/22 1945 -- 97.6 °F (36.4 °C) Axillary 85 -- 97 % --   06/03/22 1936 (!) 157/91 -- -- 85 18 97 % --   06/03/22 1930 -- -- -- 86 15 97 % --   06/03/22 1915 -- -- -- 87 11 98 % --   06/03/22 1900 136/86 -- -- 83 (!) 23 96 % --   06/03/22 1845 -- -- -- 82 (!) 27 100 % --     PHYSICAL EXAM:     GENERAL APPEARANCE: Well-developed, well-nourished female in no acute distress.  Patient is drowsy however wakes up to stimulus and follows commands.  HEENT: Normocephalic and atraumatic(except bruised/swollen lower lip). PERRL. Oropharynx unremarkable.  PULM: Comfortable on room air.  CV: RRR.  ABDOMEN: Soft, nontender.  EXTREMITIES: No signs of vascular compromise. Pulses present. No cyanosis, clubbing or edema.  SKIN: Clear; no rashes, lesions or skin breaks in exposed areas.        NEURO:   MENTAL STATUS: Patient is awake in bed and oriented to time, place, and person. Affect labile.  CRANIAL NERVES II-XII: Pupils equal,  round and reactive to light. Extraocular movements full and intact. No facial asymmetry.  MOTOR: Normal bulk. Tone normal and symmetrical throughout.  No abnormal movements. No tremor.   Strength 5/5 in bilateral upper extremities and 4/5 in bilateral lower extremities.  REFLEXES: DTRs 2+; normal and symmetric throughout.   SENSATION: Sensation grossly intact to fine touch.  COORDINATION:  Could not assess  STATION: Romberg deferred.  GAIT: Deferred.      CURRENT SCHEDULED MEDICATIONS:   furosemide (LASIX) injection  60 mg Intravenous Q12H    hydrALAZINE  100 mg Oral Q8H    hydroCHLOROthiazide  25 mg Oral Daily    insulin detemir U-100  10 Units Subcutaneous Daily    levetiracetam IV  500 mg Intravenous Q12H    pantoprazole  40 mg Intravenous Daily     CURRENT INFUSIONS:   niCARdipine 1 mg/hr (06/04/22 1059)     DATA:  Recent Labs   Lab 05/28/22 2038 05/29/22 0241 05/30/22  0655 05/31/22  0333 06/01/22  0322 06/02/22  0402 06/03/22  0344 06/04/22  0331    141 135* 135* 135* 135* 135* 134*   K 3.5 3.7 3.1* 3.5 3.8 3.8 3.8 4.5    105 101 102 104 103 103 103   CO2 25 24 24 21* 20* 23 19* 19*   BUN 46* 46* 45* 45* 49* 49* 50* 51*   CREATININE 3.9* 3.7* 3.9* 4.3* 4.9* 5.0* 4.7* 4.5*   GLU 48* 108 184* 185* 227* 230* 190* 110   CALCIUM 8.9 8.6* 8.2* 7.8* 7.6* 7.9* 7.8* 8.2*   PHOS  --  3.9 3.8 4.1 4.1 4.5 4.4 4.9*   MG  --  2.0 1.9 1.9 1.9 2.0 2.0 2.1   AST 27 34 22 21 16 14 19 24   ALT 23 25 20 20 17 17 17 14   AMMONIA 25  --   --   --   --   --   --   --      Recent Labs   Lab 05/29/22 0241 05/30/22 0655 05/31/22  0333 06/01/22  0322 06/02/22  0402 06/03/22  0344 06/04/22  0331   WBC 10.46 9.07 7.98 8.22 8.56 9.93 9.36   HGB 11.9* 12.2 10.8* 9.9* 10.0* 9.6* 10.2*   HCT 34.4* 34.0* 30.6* 28.5* 27.3* 26.0* 27.7*   * 140* 131* 130* 132* 152 190     No results found for: PROTEINCSF, GLUCCSF, WBCCSF, RBCCSF  Hemoglobin A1C   Date Value Ref Range Status   05/15/2022 5.8 (H) 4.7 - 5.6 % Final    03/06/2022 5.3 4.5 - 5.7 % Final   01/26/2022 6.8 (H) 4.7 - 5.6 % Final   04/24/2021 8.5 (H) 4.0 - 5.6 % Final     Comment:     ADA Screening Guidelines:  5.7-6.4%  Consistent with prediabetes  >or=6.5%  Consistent with diabetes    High levels of fetal hemoglobin interfere with the HbA1C  assay. Heterozygous hemoglobin variants (HbS, HgC, etc)do  not significantly interfere with this assay.   However, presence of multiple variants may affect accuracy.     09/10/2020 11.6 (H) 4.0 - 5.6 % Final     Comment:     ADA Screening Guidelines:  5.7-6.4%  Consistent with prediabetes  >or=6.5%  Consistent with diabetes  High levels of fetal hemoglobin interfere with the HbA1C  assay. Heterozygous hemoglobin variants (HbS, HgC, etc)do  not significantly interfere with this assay.   However, presence of multiple variants may affect accuracy.     02/27/2020 10.1 (H) 4.0 - 5.6 % Final     Comment:     ADA Screening Guidelines:  5.7-6.4%  Consistent with prediabetes  >or=6.5%  Consistent with diabetes  High levels of fetal hemoglobin interfere with the HbA1C  assay. Heterozygous hemoglobin variants (HbS, HgC, etc)do  not significantly interfere with this assay.   However, presence of multiple variants may affect accuracy.              I have personally reviewed and interpreted the pertinent imaging and lab results.  Imaging Results          CT Head Without Contrast (Final result)  Result time 05/28/22 21:10:20    Final result by Gianluca Juarez MD (05/28/22 21:10:20)                 Impression:      Negative CT of the brain for hemorrhage mass or infarction.    Small area of high density posterior to the trapezius muscle.  Correlate for posterior neck skull junction soft tissue hematoma.      Electronically signed by: Gianluca Juarez  Date:    05/28/2022  Time:    21:10             Narrative:    EXAMINATION:  CT HEAD WITHOUT CONTRAST    CLINICAL HISTORY:  Seizure, new-onset, no history of trauma;    TECHNIQUE:  Low dose axial  images were obtained through the head.  Coronal and sagittal reformations were also performed. Contrast was not administered.    COMPARISON:  None.    FINDINGS:  There is a 2.2 x 1.1 cm area of high density in the subcutaneous tissues posterior to the trapezius muscle at the level of the internal occipital protuberance.  The remainder of the scalp and calvarium appear unremarkable.    The brain parenchyma appears normal in attenuation with normal gray-white matter differentiation and no mass effect or pathologic fluid collection.  There is no ventriculomegaly.    Mild mucosal thickening in the left maxillary sinus is present.  No air-fluid levels are seen.  The orbits and orbital contents appear unremarkable.                                        ASSESSMENT AND PLAN:    Posterior reversible encephalopathy syndrome  Seizure  Hypoglycemia  Hypertension     Workup:   CT head:  No acute intracranial abnormality  MRI brain:  Bilateral symmetric FLAIR hyperintensity in occipital lobes.  DWI images did not reveal any infarction.Findings concerning for posterior reversible encephalopathy syndrome  EEG:  Severe encephalopathy without epileptiform activity or seizures     Plan:  · Etiology of seizures likely secondary to PRES and hypoglycemia.  · Given MRI findings of posterior reversible encephalopathy syndrome at high risk of recurrent seizures, patient was started on Keppra maintenance dose of 500 b.i.d..  · Strict control blood pressure less than 140 systolic.  Patient currently on nicardipine drip.  Titrate to goal blood pressure.  Nephrology following and managing blood pressure.  ·  After weaning off nicardipine, patient can be moved out of the ICU.  · Seizure precautions.  · Neuro checks per unit protocol.  · Physical and Occupational therapy evaluate and treat.  · Avoid medications that lower seizure threshold.  · Repeat MRI in 3 months to look for resolution of PRES changes  · Will continue to follow as needed.  Please call with any questions     Seizure education.       No driving for now, no swimming alone, no climbing high areas, no operation of heavy machinery or working with high risk electricity equipment.        Patient and/or next of kin informed.  Follow up Neurology in 2 weeks. Call [phone] to make an appointment.        Thank you kindly for including us in the care of this patient. Please do not hesitate to contact us with any questions.    Critical Care:  30 minutes of critical care time has been spent evaluating with the patient. Time includes chart review not limited to diagnostic imaging, labs, and vitals, patient assessment, discussion with family and nursing, current order evaluations, and new order entries.      Neptali Marley MD  Neurology/vascular Neurology  Date of Service: 06/04/2022  10:35 AM    Please note: This note was transcribed using voice recognition software. Because of this technology there are often uinintended grammatical, spelling, and other transcription errors. Please disregard these errors.

## 2022-06-04 NOTE — PLAN OF CARE
"Patient free of falls and injuries this shift. Awake and oriented x4. Follows some simple commands at times. Room air. No sliding scale insulin required this shift. Pelayo remains patent to gravity with yellow, some mucous threads noted. This patient is very edematous from top of head to toes. Turns side to side in bed independently. Has not slept well this shift. Multiple (at least hourly) repositioning of b/p cuff (crosses legs or cuff slipped down to ankle), titration of Cardene gtt. This patient very active on her phone most of this shift. Midline to rt upper arm patent until very end of shift. This line was very positional from the start of shift. Refused bath at this time. States "I washed myself with that girl before." Reassurance given.    "

## 2022-06-04 NOTE — ASSESSMENT & PLAN NOTE
-Monitor in ICU  -Seizure precautions  -PRN IV Ativan  -Cardene infusion for BP control; titrate PO BP medications and wean Cardene  -Neurology following; will need repeat MRI in three months  -Keppra  -Neurologic checks

## 2022-06-04 NOTE — PROGRESS NOTES
[facility] Hospital Medicine  Progress Note    Patient Name: Tabby Howard  MRN: [id number]  Patient Class: IP- Inpatient   Admission Date: 5/28/2022  Length of Stay: 6 days  Attending Physician: Max Toledo MD  Primary Care Provider: Primary Doctor No        Subjective:     Principal Problem:PRES (posterior reversible encephalopathy syndrome)        HPI:  Tabby Howard 32-year-old female presents emergency room for evaluation of seizure and hypoglycemia.  Patient has a history of diabetes and gastroparesis.  She reports that she has been taking her insulin but not able to hold any food down due to nausea and vomiting.  She was treated several times in the ER for hypoglycemia using dextrose 50%.  Just before my exam patient was reported to have clonic tonic seizure activity which lasted approximately 1 minute; however her glucose at the time was approximately 120. Previous medical history includes anemia, CKD, type 2 diabetes, gallstones, CKD, chronic diastolic heart failure, and gastroparesis.  ER workup:  CBC with thrombocytopenia of 125.  Original glucose on CMP was 48. BUN 46 and creatinine of 3.9 (baseline).  Urinalysis with 5 red blood cells and rare yeast.  CT the head was negative.  Patient was given Ativan status post seizure as well as given a Keppra loading dose.  Patient be admitted to Hospital Medicine for observation management.  Patient be placed in ICU given continue seizure and hypoglycemia.  Will consult Neurology in a.m..      Overview/Hospital Course:  Tabby Howard is a 32 year old female with a past medical history of IDDM, CKD IV, gastroparesis, and HFpEF who presented with seizure. She was discovered to be hypoglycemic and was started on a D5 infusion (since discontinued). Neurology was consulted. Her BP was observed to be elevated as well and she was started on a Cardene infusion. EEG of the brain did not show epileptiform activity, yet MRI of the brain showed concern  for PRES. Keppra was started per Neurology 5/30. Her mental status continued to improve. Nephrology was consulted 6/1 given the patient's progressive CKD. She currently remains in the ICU on a Cardene infusion. She is working with PT/OT.      Interval History:  Patient remains somewhat lethargic, but responds appropriately.  Remains on Cardene drip.  Noted anasarca.    Review of Systems   Constitutional:  Positive for unexpected weight change.   Genitourinary:  Positive for decreased urine volume.   Skin:  Positive for pallor.   Neurological:  Positive for weakness. Negative for seizures and headaches.   Psychiatric/Behavioral:  Positive for decreased concentration.    All other systems reviewed and are negative.  Objective:     Vital Signs (Most Recent):  Temp: 98.4 °F (36.9 °C) (06/03/22 1600)  Pulse: 85 (06/03/22 1936)  Resp: (!) 25 (06/03/22 1936)  BP: (!) 157/91 (06/03/22 1936)  SpO2: 97 % (06/03/22 1936)   Vital Signs (24h Range):  Temp:  [98.4 °F (36.9 °C)-98.9 °F (37.2 °C)] 98.4 °F (36.9 °C)  Pulse:  [] 85  Resp:  [6-31] 25  SpO2:  [88 %-100 %] 97 %  BP: (125-164)/() 157/91     Weight: 80.4 kg (177 lb 4 oz)  Body mass index is 32.42 kg/m².    Intake/Output Summary (Last 24 hours) at 6/3/2022 2109  Last data filed at 6/3/2022 1800  Gross per 24 hour   Intake 919.97 ml   Output 854 ml   Net 65.97 ml        Physical Exam  Vitals and nursing note reviewed.   Constitutional:       General: She is not in acute distress.     Appearance: She is well-developed. She is ill-appearing. She is not diaphoretic.      Comments: Anasarca.   HENT:      Head: Normocephalic and atraumatic.      Right Ear: External ear normal.      Left Ear: External ear normal.      Nose: Nose normal.   Eyes:      General: No scleral icterus.     Conjunctiva/sclera: Conjunctivae normal.   Neck:      Vascular: No JVD.   Cardiovascular:      Rate and Rhythm: Normal rate and regular rhythm.      Pulses: Normal pulses.      Heart  sounds: Normal heart sounds. No murmur heard.    No friction rub. No gallop.   Pulmonary:      Effort: Pulmonary effort is normal. No respiratory distress.      Breath sounds: Normal breath sounds. No wheezing or rales.   Abdominal:      General: Bowel sounds are normal. There is no distension.      Palpations: Abdomen is soft.      Tenderness: There is no abdominal tenderness. There is no guarding or rebound.   Musculoskeletal:         General: No tenderness. Normal range of motion.      Cervical back: Neck supple.      Right lower leg: No edema.      Left lower leg: No edema.   Skin:     General: Skin is warm and dry.      Coloration: Skin is not pale.      Findings: No erythema or rash.   Neurological:      Mental Status: She is oriented to person, place, and time. She is lethargic.       Significant Labs: All pertinent labs within the past 24 hours have been reviewed.    Significant Imaging: I have reviewed all pertinent imaging results/findings within the past 24 hours.      Assessment/Plan:      * PRES (posterior reversible encephalopathy syndrome)  -Monitor in ICU  -Seizure precautions  -PRN IV Ativan  -Cardene infusion for BP control  -Neurology following; will need repeat MRI in three months  -Keppra  -Neurologic checks    CKD (chronic kidney disease), stage IV  Patient with acute kidney injury likely d/t Acute tubular necrosis  caused by hypertensive emergency. KANWAL is currently improving. Labs reviewed- Renal function/electrolytes with Estimated Creatinine Clearance: 16.7 mL/min (A) (based on SCr of 4.7 mg/dL (H)). according to latest data. Monitor urine output and serial BMP and adjust therapy as needed. Avoid nephrotoxins and renally dose meds for GFR listed above.  Nephrology consulted, defer to them for further evaluation and management.        Hypertensive emergency  Patient has a current diagnosis of hypertensive urgency/emergency diagnosis: hypertensive emergency with end organ damage evidenced by  hypertensive encephalopathy and acute kidney injury which is uncontrolled.  Latest blood pressure and vitals reviewed-   Temp:  [98.4 °F (36.9 °C)-98.9 °F (37.2 °C)]   Pulse:  []   Resp:  [6-31]   BP: (125-164)/()   SpO2:  [88 %-100 %] .   Patient currently on IV antihypertensives.     Medication adjustment for hospital antihypertensives is as follows- add hydrochlorothiazide and metoprolol, weaned down Cardene.    Will goal for controlled BP reduction as noted be medications noted above and monitor and mitigate end organ damage as indicated.          Thrombocytopenia  -Trend CBC  -No chemical DVT prophylaxis      Seizure  Induced by hypoglycemia and/or PRES.  -Neurology consulted  -Neuro checks  -Seizure, fall and aspiration precautions  -PRN IV Ativan  -Cardene infusion for BP control; weaning  -Keppra        Heart failure with preserved ejection fraction  Patient is identified as having Diastolic (HFpEF) heart failure that is Acute on Chronic. CHF is currently uncontrolled due to volume overload due to: Continued edema of extremities. Latest ECHO performed and demonstrates- No results found for this or any previous visit.  . Continue Beta Blocker Furosemide and monitor clinical status closely. Monitor on telemetry. Patient is off CHF pathway.  Monitor strict Is&Os and daily weights.  Place on fluid restriction of 1.5 L. Continue to stress to patient importance of self efficacy and  on diet for CHF. L                Gastroparesis  Chronic.  -Pureed diet  -Hold Reglan as it lowers seizure threshold      Anemia of chronic kidney failure  Patient's anemia is currently controlled. Has not received any PRBCs to date..   Current CBC reviewed-   Lab Results   Component Value Date    HGB 9.6 (L) 06/03/2022    HCT 26.0 (L) 06/03/2022     Monitor serial CBC and transfuse if patient becomes hemodynamically unstable, symptomatic or H/H drops below 7/21.         Type II diabetes mellitus  Patient's FSGs are  controlled on current medication regimen.  Last A1c reviewed-   Lab Results   Component Value Date    HGBA1C 5.8 (H) 05/15/2022     Most recent fingerstick glucose reviewed-   Recent Labs   Lab 06/03/22  0626 06/03/22  1141 06/03/22  1548 06/03/22  1837   POCTGLUCOSE 251* 136* 59* 108     Current correctional scale  Low  Maintain anti-hyperglycemic dose as follows-   Antihyperglycemics (From admission, onward)            Start     Stop Route Frequency Ordered    06/02/22 1000  insulin detemir U-100 pen 10 Units         -- SubQ Daily 06/02/22 0852    05/29/22 0930  insulin aspart U-100 pen 0-5 Units         -- SubQ Every 6 hours PRN 05/29/22 0830        Hold Oral hypoglycemics while patient is in the hospital.        VTE Risk Mitigation (From admission, onward)         Ordered     IP VTE LOW RISK PATIENT  Once         05/29/22 0004     Place sequential compression device  Until discontinued         05/29/22 0004     Place JOCELYNE hose  Until discontinued         05/29/22 0004                Discharge Planning   TATIANA: 6/3/2022     Code Status: Full Code   Is the patient medically ready for discharge?:     Reason for patient still in hospital (select all that apply): Treatment  Discharge Plan A: Home with family            Critical care time spent on the evaluation and treatment of severe organ dysfunction, review of pertinent labs and imaging studies, discussions with consulting providers and discussions with patient/family: 35 minutes.      Max Toledo MD  Department of Hospital Medicine   [facility]

## 2022-06-04 NOTE — SUBJECTIVE & OBJECTIVE
"Interval History: see "Hospital Course"    Review of Systems   Constitutional:  Positive for unexpected weight change.   Genitourinary:  Positive for decreased urine volume.   Skin:  Positive for pallor.   Neurological:  Positive for weakness. Negative for seizures and headaches.   Psychiatric/Behavioral:  Positive for decreased concentration.    All other systems reviewed and are negative.  Objective:     Vital Signs (Most Recent):  Temp: 98.9 °F (37.2 °C) (06/04/22 0815)  Pulse: 95 (06/04/22 0900)  Resp: 16 (06/04/22 0900)  BP: (!) 156/89 (06/04/22 0900)  SpO2: 97 % (06/04/22 0900)   Vital Signs (24h Range):  Temp:  [97.6 °F (36.4 °C)-98.9 °F (37.2 °C)] 98.9 °F (37.2 °C)  Pulse:  [] 95  Resp:  [4-35] 16  SpO2:  [87 %-100 %] 97 %  BP: (124-180)/() 156/89     Weight: 81.9 kg (180 lb 8.9 oz)  Body mass index is 33.02 kg/m².    Intake/Output Summary (Last 24 hours) at 6/4/2022 0914  Last data filed at 6/4/2022 0540  Gross per 24 hour   Intake 842.21 ml   Output 1376 ml   Net -533.79 ml      Physical Exam  Vitals and nursing note reviewed.   Constitutional:       General: She is not in acute distress.     Appearance: She is well-developed. She is ill-appearing. She is not diaphoretic.      Comments: Anasarca.   HENT:      Head: Normocephalic and atraumatic.      Right Ear: External ear normal.      Left Ear: External ear normal.      Nose: Nose normal.   Eyes:      General: No scleral icterus.     Conjunctiva/sclera: Conjunctivae normal.   Neck:      Vascular: No JVD.   Cardiovascular:      Rate and Rhythm: Normal rate and regular rhythm.      Pulses: Normal pulses.      Heart sounds: Normal heart sounds. No murmur heard.    No friction rub. No gallop.   Pulmonary:      Effort: Pulmonary effort is normal. No respiratory distress.      Breath sounds: Normal breath sounds. No wheezing or rales.   Abdominal:      General: Bowel sounds are normal. There is no distension.      Palpations: Abdomen is soft.      " Tenderness: There is no abdominal tenderness. There is no guarding or rebound.   Musculoskeletal:         General: No tenderness. Normal range of motion.      Cervical back: Neck supple.      Right lower leg: No edema.      Left lower leg: No edema.   Skin:     General: Skin is warm and dry.      Coloration: Skin is not pale.      Findings: No erythema or rash.   Neurological:      Mental Status: She is oriented to person, place, and time.       Significant Labs: All pertinent labs within the past 24 hours have been reviewed.    Significant Imaging: I have reviewed all pertinent imaging results/findings within the past 24 hours.

## 2022-06-04 NOTE — PROGRESS NOTES
[facility] Hospital Medicine  Progress Note    Patient Name: Tabby Howard  MRN: [id number]  Patient Class: IP- Inpatient   Admission Date: 5/28/2022  Length of Stay: 7 days  Attending Physician: Raymundo Parker MD  Primary Care Provider: Primary Doctor No        Subjective:     Principal Problem:PRES (posterior reversible encephalopathy syndrome)        HPI:  Tabby Howard 32-year-old female presents emergency room for evaluation of seizure and hypoglycemia.  Patient has a history of diabetes and gastroparesis.  She reports that she has been taking her insulin but not able to hold any food down due to nausea and vomiting.  She was treated several times in the ER for hypoglycemia using dextrose 50%.  Just before my exam patient was reported to have clonic tonic seizure activity which lasted approximately 1 minute; however her glucose at the time was approximately 120. Previous medical history includes anemia, CKD, type 2 diabetes, gallstones, CKD, chronic diastolic heart failure, and gastroparesis.  ER workup:  CBC with thrombocytopenia of 125.  Original glucose on CMP was 48. BUN 46 and creatinine of 3.9 (baseline).  Urinalysis with 5 red blood cells and rare yeast.  CT the head was negative.  Patient was given Ativan status post seizure as well as given a Keppra loading dose.  Patient be admitted to Hospital Medicine for observation management.  Patient be placed in ICU given continue seizure and hypoglycemia.  Will consult Neurology in a.m..      Overview/Hospital Course:  Tabby Howard is a 32 year old female with a past medical history of IDDM, CKD IV, gastroparesis, and HFpEF who presented with seizure. She was discovered to be hypoglycemic and was started on a D5 infusion (since discontinued). Neurology was consulted. Her BP was observed to be elevated as well and she was started on a Cardene infusion. EEG of the brain did not show epileptiform activity, yet MRI of the brain showed  "concern for PRES. Keppra was started per Neurology 5/30. Her mental status continued to improve. Nephrology was consulted 6/1 given the patient's progressive CKD. She currently remains in the ICU on a Cardene infusion as PO BP medications are currently being titrated. She is working with PT/OT.      Interval History: see "Hospital Course"    Review of Systems   Constitutional:  Positive for unexpected weight change.   Genitourinary:  Positive for decreased urine volume.   Skin:  Positive for pallor.   Neurological:  Positive for weakness. Negative for seizures and headaches.   Psychiatric/Behavioral:  Positive for decreased concentration.    All other systems reviewed and are negative.  Objective:     Vital Signs (Most Recent):  Temp: 98.9 °F (37.2 °C) (06/04/22 0815)  Pulse: 95 (06/04/22 0900)  Resp: 16 (06/04/22 0900)  BP: (!) 156/89 (06/04/22 0900)  SpO2: 97 % (06/04/22 0900)   Vital Signs (24h Range):  Temp:  [97.6 °F (36.4 °C)-98.9 °F (37.2 °C)] 98.9 °F (37.2 °C)  Pulse:  [] 95  Resp:  [4-35] 16  SpO2:  [87 %-100 %] 97 %  BP: (124-180)/() 156/89     Weight: 81.9 kg (180 lb 8.9 oz)  Body mass index is 33.02 kg/m².    Intake/Output Summary (Last 24 hours) at 6/4/2022 0914  Last data filed at 6/4/2022 0540  Gross per 24 hour   Intake 842.21 ml   Output 1376 ml   Net -533.79 ml      Physical Exam  Vitals and nursing note reviewed.   Constitutional:       General: She is not in acute distress.     Appearance: She is well-developed. She is ill-appearing. She is not diaphoretic.      Comments: Anasarca.   HENT:      Head: Normocephalic and atraumatic.      Right Ear: External ear normal.      Left Ear: External ear normal.      Nose: Nose normal.   Eyes:      General: No scleral icterus.     Conjunctiva/sclera: Conjunctivae normal.   Neck:      Vascular: No JVD.   Cardiovascular:      Rate and Rhythm: Normal rate and regular rhythm.      Pulses: Normal pulses.      Heart sounds: Normal heart sounds. No " murmur heard.    No friction rub. No gallop.   Pulmonary:      Effort: Pulmonary effort is normal. No respiratory distress.      Breath sounds: Normal breath sounds. No wheezing or rales.   Abdominal:      General: Bowel sounds are normal. There is no distension.      Palpations: Abdomen is soft.      Tenderness: There is no abdominal tenderness. There is no guarding or rebound.   Musculoskeletal:         General: No tenderness. Normal range of motion.      Cervical back: Neck supple.      Right lower leg: No edema.      Left lower leg: No edema.   Skin:     General: Skin is warm and dry.      Coloration: Skin is not pale.      Findings: No erythema or rash.   Neurological:      Mental Status: She is oriented to person, place, and time.       Significant Labs: All pertinent labs within the past 24 hours have been reviewed.    Significant Imaging: I have reviewed all pertinent imaging results/findings within the past 24 hours.      Assessment/Plan:      * PRES (posterior reversible encephalopathy syndrome)  -Monitor in ICU  -Seizure precautions  -PRN IV Ativan  -Cardene infusion for BP control; titrate PO BP medications and wean Cardene  -Neurology following; will need repeat MRI in three months  -Keppra  -Neurologic checks    Seizure  Induced by hypoglycemia and/or PRES.  -Neurology consulted  -Neuro checks  -Seizure, fall and aspiration precautions  -PRN IV Ativan  -Cardene infusion for BP control; weaning  -Keppra        Gastroparesis  Chronic.  -Renal and diabetic diet  -Hold Reglan as it lowers seizure threshold      CKD (chronic kidney disease), stage IV  -Nephrology following  -Renally dose all medications  -Avoid nephrotoxic agents  -Monitor UOP and electrolytes  -Trend creatinine        Anemia of chronic kidney failure  -Trend with CBC        Type II diabetes mellitus  -SSI  -Detemir 10  -Diabetic renal diet  -AC HS glucose checks  -Hypoglycemic precautions      VTE Risk Mitigation (From admission, onward)          Ordered     IP VTE LOW RISK PATIENT  Once         05/29/22 0004     Place sequential compression device  Until discontinued         05/29/22 0004     Place JOCELYNE hose  Until discontinued         05/29/22 0004                Discharge Planning   TATIANA: 6/3/2022     Code Status: Full Code   Is the patient medically ready for discharge?:     Reason for patient still in hospital (select all that apply): Patient trending condition, Treatment and Consult recommendations  Discharge Plan A: Home with family            Critical care time spent on the evaluation and treatment of severe organ dysfunction, review of pertinent labs and imaging studies, discussions with consulting providers and discussions with patient/family: 33 minutes.      Raymundo Parker MD  Department of Hospital Medicine   [facility]

## 2022-06-04 NOTE — ASSESSMENT & PLAN NOTE
Patient has a current diagnosis of hypertensive urgency/emergency diagnosis: hypertensive emergency with end organ damage evidenced by hypertensive encephalopathy and acute kidney injury which is uncontrolled.  Latest blood pressure and vitals reviewed-   Temp:  [98.4 °F (36.9 °C)-98.9 °F (37.2 °C)]   Pulse:  []   Resp:  [6-31]   BP: (125-164)/()   SpO2:  [88 %-100 %] .   Patient currently on IV antihypertensives.     Medication adjustment for hospital antihypertensives is as follows- add hydrochlorothiazide and metoprolol, weaned down Cardene.    Will goal for controlled BP reduction as noted be medications noted above and monitor and mitigate end organ damage as indicated.

## 2022-06-04 NOTE — ASSESSMENT & PLAN NOTE
Patient's anemia is currently controlled. Has not received any PRBCs to date..   Current CBC reviewed-   Lab Results   Component Value Date    HGB 9.6 (L) 06/03/2022    HCT 26.0 (L) 06/03/2022     Monitor serial CBC and transfuse if patient becomes hemodynamically unstable, symptomatic or H/H drops below 7/21.

## 2022-06-04 NOTE — ASSESSMENT & PLAN NOTE
Patient has a current diagnosis of hypertensive urgency/emergency diagnosis: hypertensive emergency with end organ damage evidenced by hypertensive encephalopathy and acute kidney injury which is uncontrolled.  Latest blood pressure and vitals reviewed-   Temp:  [97.6 °F (36.4 °C)-98.9 °F (37.2 °C)]   Pulse:  []   Resp:  [4-35]   BP: (124-180)/()   SpO2:  [87 %-100 %] .   Patient currently on IV antihypertensives.     Medication adjustment for hospital antihypertensives is as follows- add hydrochlorothiazide and metoprolol, weaned down Cardene.    Will goal for controlled BP reduction as noted be medications noted above and monitor and mitigate end organ damage as indicated.

## 2022-06-04 NOTE — ASSESSMENT & PLAN NOTE
-Nephrology following  -Renally dose all medications  -Avoid nephrotoxic agents  -Monitor UOP and electrolytes  -Trend creatinine

## 2022-06-04 NOTE — CARE UPDATE
06/04/22 0729   Patient Assessment/Suction   Level of Consciousness (AVPU) alert   Respiratory Effort Normal;Unlabored   All Lung Fields Breath Sounds clear   Rhythm/Pattern, Respiratory unlabored   PRE-TX-O2   O2 Device (Oxygen Therapy) room air   Pulse Oximetry Type Continuous   $ Pulse Oximetry - Multiple Charge Pulse Oximetry - Multiple   Aerosol Therapy   $ Aerosol Therapy Charges PRN treatment not required   Respiratory Treatment Status (SVN) PRN treatment not required

## 2022-06-04 NOTE — ASSESSMENT & PLAN NOTE
Patient with acute kidney injury likely d/t Acute tubular necrosis  caused by hypertensive emergency. KANWAL is currently improving. Labs reviewed- Renal function/electrolytes with Estimated Creatinine Clearance: 16.7 mL/min (A) (based on SCr of 4.7 mg/dL (H)). according to latest data. Monitor urine output and serial BMP and adjust therapy as needed. Avoid nephrotoxins and renally dose meds for GFR listed above.  Nephrology consulted, defer to them for further evaluation and management.

## 2022-06-04 NOTE — PROGRESS NOTES
Nephrology Consult Note        Patient Name: Tabby Howard  MRN: [id number]    Patient Class: IP- Inpatient   Admission Date: 5/28/2022  Length of Stay: 7 days  Date of Service: 6/4/2022    Attending Physician: Raymundo Parker MD  Primary Care Provider: Primary Doctor No    Reason for Consult: ckd4/acidosis/hyponatremia/anemia/htn/edema    SUBJECTIVE:     HPI: 32F with anemia, CKD, type 2 diabetes, gallstones, CKD 4, chronic diastolic heart failure, gastroparesis admitted on 5/29 for seizure and hypoglycemia. She has been taking her insulin but not able to hold any food down due to nausea and vomiting. She was treated several times in the ER for hypoglycemia, but had a witnessed clonic-tonic seizure for about 1 minute. Received Ativan and Keppra. Lisnoprill was topped today after 2 doses, probably over concern for worsening sCr. Amodipine and Clonidine was added in addition to nicardipine gtt. UO is low as documented and she is net positive since admission. Renal is consulted for co-management.    6/2 VSS, no new complains. UO still low, BP is up - will increase diuretics to 60 mg BID.  6/3 VSS, keep diuretic BID and titrate Nicardipine to MAP > 60, let BP ride a little higher to help diuresis. sCr is up but stable. DO not add oral BP meds yet as I am attempting diuresis and too much BP control can cause KANWAL.  6/4  UOP 1.4L.  Scr trended down a little.  Still requiring cardene gtt.  Edemetous, getting IV lasix bid and HCTZ.  Looking at phone, no complaints.    Past Medical History:   Diagnosis Date    CKD (chronic kidney disease), stage IV 4/12/2022    Diabetes mellitus due to underlying condition with unspecified complications 4/12/2022    Gastroparesis 4/12/2022    Heart failure with preserved ejection fraction 4/12/2022    EF 55% on 3/22    History of gastroesophageal reflux (GERD)     History of supraventricular tachycardia     Sickle cell trait 4/12/2022    Type 2 diabetes mellitus      Past Surgical  History:   Procedure Laterality Date     SECTION      x 3    ESOPHAGOGASTRODUODENOSCOPY N/A 10/18/2019    Procedure: ESOPHAGOGASTRODUODENOSCOPY (EGD);  Surgeon: Gianluca Mendez MD;  Location: [facility];  Service: Endoscopy;  Laterality: N/A;     Family History   Problem Relation Age of Onset    Diabetes Mother     Diabetes Father      Social History     Tobacco Use    Smoking status: Never Smoker    Smokeless tobacco: Never Used   Substance Use Topics    Alcohol use: No    Drug use: No       Review of patient's allergies indicates:   Allergen Reactions    Penicillins Hives       Outpatient meds:  No current facility-administered medications on file prior to encounter.     Current Outpatient Medications on File Prior to Encounter   Medication Sig Dispense Refill    acetaminophen (TYLENOL) 325 MG tablet Take 2 tablets (650 mg total) by mouth every 6 (six) hours as needed for Pain or Temperature greater than (100.3). 30 tablet 0    blood-glucose meter kit Use as instructed 1 each 0    cephALEXin (KEFLEX) 500 MG capsule Take 500 mg by mouth 4 (four) times daily.      doxycycline (VIBRAMYCIN) 100 MG Cap Take 100 mg by mouth 2 (two) times daily.      ibuprofen (ADVIL,MOTRIN) 600 MG tablet Take 1 tablet (600 mg total) by mouth every 6 (six) hours as needed for Pain. 20 tablet 0    insulin (BASAGLAR KWIKPEN U-100 INSULIN) glargine 100 units/mL (3mL) SubQ pen Inject 28 Units into the skin every evening. 25.2 mL 3    insulin glulisine U-100 (APIDRA U-100 INSULIN) 100 unit/mL injection Inject 8 Units into the skin 3 (three) times daily before meals. 21.6 mL 3    metoclopramide HCl (REGLAN) 10 MG tablet Take 1 tablet (10 mg total) by mouth every 6 (six) hours as needed (headache, nausea or vomiting). 30 tablet 0    ondansetron (ZOFRAN) 4 MG tablet Take 1 tablet (4 mg total) by mouth every 6 (six) hours as needed for Nausea. 30 tablet 0    ondansetron (ZOFRAN-ODT) 4 MG TbDL Take 1 tablet (4 mg total)  by mouth every 6 (six) hours as needed (nausea or vomiting). 12 tablet 0    oxyCODONE (ROXICODONE) 5 MG immediate release tablet Take 1 tablet (5 mg total) by mouth every 6 (six) hours as needed for Pain. 20 tablet 0    pantoprazole (PROTONIX) 40 MG tablet Take 1 tablet (40 mg total) by mouth once daily. 30 tablet 3    polyethylene glycol (GLYCOLAX) 17 gram PwPk Take 17 g by mouth once daily. 30 each 1    promethazine (PHENERGAN) 25 MG suppository Place 1 suppository (25 mg total) rectally every 6 (six) hours as needed for Nausea (For severe nausea and vomiting not improved with oral medications). 10 suppository 0    senna-docusate 8.6-50 mg (SENNA WITH DOCUSATE SODIUM) 8.6-50 mg per tablet Take 1 tablet by mouth daily as needed for Constipation. 30 tablet 1       Scheduled meds:   furosemide (LASIX) injection  60 mg Intravenous Q12H    hydroCHLOROthiazide  25 mg Oral Daily    insulin detemir U-100  10 Units Subcutaneous Daily    levetiracetam IV  500 mg Intravenous Q12H    metoprolol tartrate  50 mg Oral BID    pantoprazole  40 mg Intravenous Daily       Infusions:   niCARdipine 2 mg/hr (06/04/22 0540)       PRN meds:  acetaminophen, albuterol-ipratropium, aluminum-magnesium hydroxide-simethicone, dextrose 10%, dextrose 10%, diphenhydrAMINE, EPINEPHrine, glucagon (human recombinant), glucose, glucose, hydrALAZINE, insulin aspart U-100, magnesium oxide, magnesium oxide, melatonin, naloxone, ondansetron, potassium bicarbonate, potassium bicarbonate, potassium bicarbonate, potassium, sodium phosphates, potassium, sodium phosphates, potassium, sodium phosphates, simethicone, sodium chloride 0.9%    Review of Systems:    OBJECTIVE:     Vital Signs and IO (Last 24H):  Temp:  [97.6 °F (36.4 °C)-98.4 °F (36.9 °C)]   Pulse:  []   Resp:  [4-35]   BP: (124-180)/()   SpO2:  [87 %-100 %]   I/O last 3 completed shifts:  In: 1762.2 [P.O.:1155; I.V.:607.2]  Out: 1934 [Urine:1933; Stool:1]    Wt Readings  from Last 5 Encounters:   06/04/22 81.9 kg (180 lb 8.9 oz)   05/06/22 70.3 kg (155 lb)   04/11/22 65.8 kg (145 lb)   12/20/21 59.9 kg (132 lb)   05/04/21 65.6 kg (144 lb 11.7 oz)     Physical Exam:  Physical Exam  Constitutional:       Appearance: She is well-developed. She is not diaphoretic.   HENT:      Head: Normocephalic and atraumatic.   Eyes:      General: No scleral icterus.     Pupils: Pupils are equal, round, and reactive to light.   Cardiovascular:      Rate and Rhythm: Normal rate and regular rhythm.   Pulmonary:      Effort: Pulmonary effort is normal. No respiratory distress.      Breath sounds: No stridor.   Abdominal:      General: There is no distension.      Palpations: Abdomen is soft.   Musculoskeletal:         General: Swelling present. No deformity. Normal range of motion.      Cervical back: Neck supple.   Skin:     General: Skin is warm and dry.      Findings: No erythema or rash.   Neurological:      Mental Status: She is alert and oriented to person, place, and time.      Cranial Nerves: No cranial nerve deficit.   Psychiatric:         Behavior: Behavior normal.         Body mass index is 33.02 kg/m².    Laboratory:  Recent Labs   Lab 06/02/22  0402 06/03/22  0344 06/04/22  0331   * 135* 134*   K 3.8 3.8 4.5    103 103   CO2 23 19* 19*   BUN 49* 50* 51*   CREATININE 5.0* 4.7* 4.5*   ESTGFRAFRICA 12* 13* 14*   EGFRNONAA 11* 11* 12*   * 190* 110       Recent Labs   Lab 06/02/22  0402 06/03/22  0344 06/04/22  0331   CALCIUM 7.9* 7.8* 8.2*   ALBUMIN 1.9* 1.8* 1.8*   PHOS 4.5 4.4 4.9*   MG 2.0 2.0 2.1             Recent Labs   Lab 06/01/22  2206 06/02/22  0517 06/02/22  1211 06/02/22  1916 06/02/22  2139 06/03/22  0626 06/03/22  1141 06/03/22  1548 06/03/22  1837 06/03/22  2131   POCTGLUCOSE 261* 243* 200* 134* 182* 251* 136* 59* 108 115*       Recent Labs   Lab 01/26/22  0240 03/06/22  1135 05/15/22  1954   Hemoglobin A1C 6.8 H 5.3 5.8 H       Recent Labs   Lab 06/02/22  0402  06/03/22  0344 06/04/22  0331   WBC 8.56 9.93 9.36   HGB 10.0* 9.6* 10.2*   HCT 27.3* 26.0* 27.7*   * 152 190   MCV 80* 80* 79*   MCHC 36.6* 36.9* 36.8*   MONO 11.4  1.0 12.0  1.2* 8.1  0.8       Recent Labs   Lab 06/02/22  0402 06/03/22  0344 06/04/22  0331   BILITOT 0.6 0.6 0.7   PROT 4.8* 4.9* 5.4*   ALBUMIN 1.9* 1.8* 1.8*   ALKPHOS 67 62 65   ALT 17 17 14   AST 14 19 24       Recent Labs   Lab 09/10/20  0355 09/10/20  0355 12/13/20  1702 04/23/21 2006 04/24/21  1259 05/28/22 2036   Color, UA Yellow  --  Red A Yellow Yellow Yellow   Appearance, UA Clear  --  Cloudy A Clear Clear Clear   pH, UA 8.0  --  6.0 6.0 6.0 7.0   Specific Gravity, UA 1.010  --  1.010 1.020 1.010 1.020   Protein, UA Negative  --  2+ A 3+ A 3+ A 3+ A   Glucose, UA 3+ A  --  3+ A 3+ A 3+ A 2+ A   Ketones, UA Trace A  --  1+ A 1+ A Trace A Negative   Urobilinogen, UA 1.0  --  1.0  --   --  Negative   Bilirubin (UA) Negative  --  Negative Negative Negative Negative   Occult Blood UA 1+ A  --  3+ A 2+ A 1+ A 2+ A   Nitrite, UA Negative  --  Positive A Negative Negative Negative   RBC, UA 2  --  >100 H 54 H 2 5 H   WBC, UA 12 H  --  2 44 H 5 1   Bacteria Many A  --  Few A Occasional Rare None   Hyaline Casts, UA  --    < > 0 0 0 0    < > = values in this interval not displayed.       Recent Labs   Lab 12/13/20  1042 04/23/21  1952 04/23/21 2006 04/11/22  2043 04/11/22  2345 04/12/22  0211   POC PH 7.457 H 7.458 H  --  7.365  --   --    POC PCO2 32.6 L 32.9 L  --  39.5  --   --    POC HCO3 23.0 L 23.3 L  --  22.6 L  --   --    POC PO2 26 LL 24 LL  --  25 L  --   --    POC SATURATED O2 53 L 47 L  --  42 L  --   --    POC BE -1 -1  --  -3  --   --    Sample VENOUS VENOUS   < > VENOUS VENOUS VENOUS    < > = values in this interval not displayed.       Microbiology Results (last 7 days)     ** No results found for the last 168 hours. **        ASSESSMENT/PLAN:     CKD stage 4  Edema due to  hypoalbuminemia  Hyponatremia  Acidosis  Uncontrolled DM  No NSAIDs, ACEI/ARB, IV contrast or other nephrotoxins.  Keep MAP > 60, SBP > 100.  Dose meds for GFR < 30 ml/min.  Renal diet - low K, low phos. Limit free water intake.  Hold oral BP meds, titrate diuretic as tolerated, keep [place] with close IO. Tolerate -160 to help diuresis, keep nicardipine drip.  Control DM.    Anemia of CKD  Hgb and HCT are acceptable. Monitor.  Will provide SHELLEY and/or IV iron PRN.    HTN  BP seem controlled.   Tolerate asymptomatic HTN up to -160.  Stop all oral meds, keep Nicardipine, increase diuretic as tolerated to deal with edema, keep [place]ton.    Thank you for allowing us to participate in the care of your patient!   We will follow the patient and provide recommendations as needed.    Patient care time was spent personally by me on the following activities:   · Obtaining a history.  · Examination of patient.  · Providing medical care at the patients bedside.  · Developing a treatment plan with patient or surrogate and bedside caregivers.  · Ordering and reviewing laboratory studies, radiographic studies, pulse oximetry.  · Ordering and performing treatments and interventions.  · Evaluation of patient's response to treatment.  · Discussions with consultants while on the unit and immediately available to the patient.  · Re-evaluation of the patient's condition.  · Documentation in the medical record.     Geeta Kaur NP      [facility]  [address] [place] [address]  JEANMARIE [place] [address]    [phone] - tel  [phone] - fax    6/4/2022

## 2022-06-04 NOTE — ASSESSMENT & PLAN NOTE
Patient's FSGs are controlled on current medication regimen.  Last A1c reviewed-   Lab Results   Component Value Date    HGBA1C 5.8 (H) 05/15/2022     Most recent fingerstick glucose reviewed-   Recent Labs   Lab 06/03/22  0626 06/03/22  1141 06/03/22  1548 06/03/22  1837   POCTGLUCOSE 251* 136* 59* 108     Current correctional scale  Low  Maintain anti-hyperglycemic dose as follows-   Antihyperglycemics (From admission, onward)            Start     Stop Route Frequency Ordered    06/02/22 1000  insulin detemir U-100 pen 10 Units         -- SubQ Daily 06/02/22 0852    05/29/22 0930  insulin aspart U-100 pen 0-5 Units         -- SubQ Every 6 hours PRN 05/29/22 0830        Hold Oral hypoglycemics while patient is in the hospital.

## 2022-06-04 NOTE — ASSESSMENT & PLAN NOTE
Patient is identified as having Diastolic (HFpEF) heart failure that is Acute on Chronic. CHF is currently uncontrolled due to volume overload due to: Continued edema of extremities. Latest ECHO performed and demonstrates- No results found for this or any previous visit.  . Continue Beta Blocker Furosemide and monitor clinical status closely. Monitor on telemetry. Patient is off CHF pathway.  Monitor strict Is&Os and daily weights.  Place on fluid restriction of 1.5 L. Continue to stress to patient importance of self efficacy and  on diet for CHF. L

## 2022-06-04 NOTE — PROGRESS NOTES
[facility]  Progress Note  Date: 2022 10:35 AM            Patient Name: Tabby Howard   MRN: [id number]   : 1990    AGE: 32 y.o.    LOS: 6 days Hospital Day: 7  Admit date: 2022  8:17 PM         HPI per EMR: Tabby Howard is a 32 y.o. female with a history of anemia, CKD, type 2 diabetes, gallstones, CKD, chronic diastolic heart failure, and gastroparesis. presents emergency room for evaluation of seizure and hypoglycemia.  Patient has a history of diabetes and gastroparesis.  She reports that she has been taking her insulin but not able to hold any food down due to nausea and vomiting.  She was treated several times in the ER for hypoglycemia using dextrose 50%.  In the ER, patient was reported to have clonic tonic seizure activity which lasted approximately 1 minute.  Patient was given Ativan status post seizure as well as given a Keppra loading dose.     Neurology consult:  Patient was seen examined by me this morning.  She is lethargic, opens eyes to stimulus is able to tell me her name however she does not follow commands or answer any other questions.  She is able to move all extremities symmetrically.     Patient presented to the hospital with severe nausea and vomiting.  In the ER, she was reported to have a generalized tonic-clonic seizure lasting for about a minute.  She did bite her tongue during the episode.  Prior to the episode, her blood sugar was 48. She received a loading dose of Keppra.  She was admitted to the ICU for further care.    2022: No acute events overnight. Patient was seen and examined by me this morning. Neuro exam is improving.  Patient is drowsy however wakes up to stimulus and is oriented to place, year but not to month.  She is able to follow commands.    2022:  No acute events overnight.  No further seizure-like activity.  On exam, patient is drowsy however wakes up to stimulus is able to answer questions and follow  commands.    06/01/2022:  Patient was seen examined by me this morning.  No acute events overnight.  No further seizure-like activity.  She remains to be drowsy but wakes up, she is oriented to time place and person and is able to follow commands.    06/02/2022:  Patient was seen examined by me this morning.  No acute events overnight.  Remains to be on nicardipine drip with blood pressure goal less than 140 systolic    06/03/2022:  Patient was seen examined by me this morning.  She remains to be in the ICU.  Nicardipine drip being titrated for a goal blood pressure of less than 140 systolic.  Nephrology following and managing uncontrolled hypertension.       Vitals:  Patient Vitals for the past 24 hrs:   BP Temp Temp src Pulse Resp SpO2 Weight   06/03/22 1845 -- -- -- 82 (!) 27 100 % --   06/03/22 1830 134/84 -- -- 82 18 95 % --   06/03/22 1815 -- -- -- 83 18 95 % --   06/03/22 1800 133/83 -- -- 81 18 (!) 94 % --   06/03/22 1745 -- -- -- 81 17 96 % --   06/03/22 1730 128/80 -- -- 81 16 96 % --   06/03/22 1715 -- -- -- 83 18 97 % --   06/03/22 1700 130/84 -- -- 82 19 96 % --   06/03/22 1645 -- -- -- 79 -- 97 % --   06/03/22 1630 -- -- -- 80 -- 98 % --   06/03/22 1624 135/84 -- -- -- -- -- --   06/03/22 1615 -- -- -- 80 18 96 % --   06/03/22 1600 135/84 98.4 °F (36.9 °C) Oral 77 17 96 % --   06/03/22 1545 -- -- -- 79 12 98 % --   06/03/22 1530 132/78 -- -- 81 (!) 8 96 % --   06/03/22 1515 -- -- -- 80 -- -- --   06/03/22 1500 139/80 -- -- 80 -- -- --   06/03/22 1445 -- -- -- 80 -- -- --   06/03/22 1430 (!) 141/80 -- -- 80 -- -- --   06/03/22 1415 -- -- -- 67 -- -- --   06/03/22 1400 134/79 -- -- 80 13 (!) 93 % --   06/03/22 1345 -- -- -- 82 (!) 21 97 % --   06/03/22 1330 (!) 158/77 -- -- 83 18 95 % --   06/03/22 1315 -- -- -- 89 18 98 % --   06/03/22 1300 135/87 -- -- 98 13 98 % --   06/03/22 1245 -- -- -- 93 18 (!) 94 % --   06/03/22 1242 125/74 -- -- -- -- -- --   06/03/22 1230 125/74 -- -- 93 (!) 7 95 % --    06/03/22 1215 -- -- -- 95 15 96 % --   06/03/22 1200 126/75 -- -- 97 13 95 % --   06/03/22 1145 -- -- -- 98 (!) 9 96 % --   06/03/22 1130 130/75 98.4 °F (36.9 °C) Oral 104 (!) 31 95 % --   06/03/22 1115 -- -- -- 97 18 98 % --   06/03/22 1100 (!) 158/108 -- -- 99 14 99 % --   06/03/22 1045 -- -- -- 101 20 98 % --   06/03/22 1030 (!) 155/109 -- -- 100 13 99 % --   06/03/22 1000 (!) 150/82 -- -- 99 (!) 29 96 % --   06/03/22 0945 -- -- -- 98 14 (!) 94 % --   06/03/22 0930 129/73 -- -- 102 (!) 28 96 % --   06/03/22 0915 -- -- -- 101 12 (!) 93 % --   06/03/22 0900 (!) 140/78 -- -- 101 (!) 9 96 % --   06/03/22 0845 -- -- -- 105 (!) 23 97 % --   06/03/22 0830 134/77 -- -- 106 (!) 29 98 % --   06/03/22 0815 -- -- -- 109 (!) 27 99 % --   06/03/22 0800 137/77 -- -- 107 19 98 % --   06/03/22 0745 -- -- -- 108 (!) 23 97 % --   06/03/22 0730 137/79 98.9 °F (37.2 °C) Oral 110 (!) 29 95 % --   06/03/22 0715 -- -- -- 107 13 95 % --   06/03/22 0700 137/72 -- -- 109 12 96 % --   06/03/22 0645 -- -- -- 109 (!) 21 97 % --   06/03/22 0630 129/73 -- -- 106 17 98 % --   06/03/22 0629 133/68 -- -- -- -- -- --   06/03/22 0600 133/68 -- -- 102 19 (!) 93 % --   06/03/22 0530 127/69 -- -- 103 18 (!) 91 % --   06/03/22 0500 132/69 -- -- 104 20 (!) 94 % --   06/03/22 0430 134/66 -- -- 105 20 95 % --   06/03/22 0400 (!) 141/79 -- -- 109 (!) 21 95 % --   06/03/22 0330 136/73 98.7 °F (37.1 °C) Oral (!) 114 (!) 9 (!) 88 % --   06/03/22 0300 (!) 142/77 -- -- (!) 112 11 97 % --   06/03/22 0230 (!) 141/78 -- -- (!) 113 13 (!) 93 % --   06/03/22 0200 137/80 -- -- (!) 113 13 (!) 94 % 80.4 kg (177 lb 4 oz)   06/03/22 0130 137/81 -- -- (!) 113 (!) 21 95 % --   06/03/22 0100 137/70 -- -- (!) 113 14 (!) 94 % --   06/03/22 0030 (!) 164/79 -- -- (!) 111 (!) 6 95 % --   06/03/22 0010 (!) 162/83 -- -- -- -- -- --   06/03/22 0000 (!) 162/83 98.7 °F (37.1 °C) Oral 110 16 97 % --   06/02/22 2330 (!) 153/76 -- -- 110 17 (!) 94 % --   06/02/22 2300 (!) 149/75 -- --  (!) 113 20 95 % --   06/02/22 2230 131/72 -- -- (!) 114 (!) 21 96 % --   06/02/22 2200 135/71 -- -- 107 (!) 28 (!) 94 % --   06/02/22 2130 130/62 -- -- 110 (!) 21 (!) 92 % --   06/02/22 2100 136/71 -- -- 106 20 (!) 93 % --   06/02/22 2030 (!) 146/74 -- -- 108 (!) 23 97 % --   06/02/22 2000 (!) 147/73 -- -- 105 20 99 % --     PHYSICAL EXAM:     GENERAL APPEARANCE: Well-developed, well-nourished female in no acute distress.  Patient is drowsy however wakes up to stimulus and follows commands.  HEENT: Normocephalic and atraumatic(except bruised/swollen lower lip). PERRL. Oropharynx unremarkable.  PULM: Comfortable on room air.  CV: RRR.  ABDOMEN: Soft, nontender.  EXTREMITIES: No signs of vascular compromise. Pulses present. No cyanosis, clubbing or edema.  SKIN: Clear; no rashes, lesions or skin breaks in exposed areas.        NEURO:   MENTAL STATUS: Patient is awake in bed and oriented to time, place, and person. Affect labile.  CRANIAL NERVES II-XII: Pupils equal, round and reactive to light. Extraocular movements full and intact. No facial asymmetry.  MOTOR: Normal bulk. Tone normal and symmetrical throughout.  No abnormal movements. No tremor.   Strength 5/5 in bilateral upper extremities and 4/5 in bilateral lower extremities.  REFLEXES: DTRs 2+; normal and symmetric throughout.   SENSATION: Sensation grossly intact to fine touch.  COORDINATION:  Could not assess  STATION: Romberg deferred.  GAIT: Deferred.      CURRENT SCHEDULED MEDICATIONS:   furosemide (LASIX) injection  60 mg Intravenous Q12H    hydroCHLOROthiazide  25 mg Oral Daily    insulin detemir U-100  10 Units Subcutaneous Daily    levetiracetam IV  500 mg Intravenous Q12H    metoprolol tartrate  50 mg Oral BID    pantoprazole  40 mg Intravenous Daily     CURRENT INFUSIONS:   niCARdipine 4 mg/hr (06/03/22 1625)     DATA:  Recent Labs   Lab 05/28/22 2038 05/29/22  0241 05/30/22  0655 05/31/22  0333 06/01/22  0322 06/02/22  0402 06/03/22  0344     141 135* 135* 135* 135* 135*   K 3.5 3.7 3.1* 3.5 3.8 3.8 3.8    105 101 102 104 103 103   CO2 25 24 24 21* 20* 23 19*   BUN 46* 46* 45* 45* 49* 49* 50*   CREATININE 3.9* 3.7* 3.9* 4.3* 4.9* 5.0* 4.7*   GLU 48* 108 184* 185* 227* 230* 190*   CALCIUM 8.9 8.6* 8.2* 7.8* 7.6* 7.9* 7.8*   PHOS  --  3.9 3.8 4.1 4.1 4.5 4.4   MG  --  2.0 1.9 1.9 1.9 2.0 2.0   AST 27 34 22 21 16 14 19   ALT 23 25 20 20 17 17 17   AMMONIA 25  --   --   --   --   --   --      Recent Labs   Lab 05/28/22 2038 05/29/22  0241 05/30/22  0655 05/31/22  0333 06/01/22  0322 06/02/22  0402 06/03/22  0344   WBC 6.88 10.46 9.07 7.98 8.22 8.56 9.93   HGB 12.2 11.9* 12.2 10.8* 9.9* 10.0* 9.6*   HCT 33.9* 34.4* 34.0* 30.6* 28.5* 27.3* 26.0*   * 107* 140* 131* 130* 132* 152     No results found for: PROTEINCSF, GLUCCSF, WBCCSF, RBCCSF  Hemoglobin A1C   Date Value Ref Range Status   05/15/2022 5.8 (H) 4.7 - 5.6 % Final   03/06/2022 5.3 4.5 - 5.7 % Final   01/26/2022 6.8 (H) 4.7 - 5.6 % Final   04/24/2021 8.5 (H) 4.0 - 5.6 % Final     Comment:     ADA Screening Guidelines:  5.7-6.4%  Consistent with prediabetes  >or=6.5%  Consistent with diabetes    High levels of fetal hemoglobin interfere with the HbA1C  assay. Heterozygous hemoglobin variants (HbS, HgC, etc)do  not significantly interfere with this assay.   However, presence of multiple variants may affect accuracy.     09/10/2020 11.6 (H) 4.0 - 5.6 % Final     Comment:     ADA Screening Guidelines:  5.7-6.4%  Consistent with prediabetes  >or=6.5%  Consistent with diabetes  High levels of fetal hemoglobin interfere with the HbA1C  assay. Heterozygous hemoglobin variants (HbS, HgC, etc)do  not significantly interfere with this assay.   However, presence of multiple variants may affect accuracy.     02/27/2020 10.1 (H) 4.0 - 5.6 % Final     Comment:     ADA Screening Guidelines:  5.7-6.4%  Consistent with prediabetes  >or=6.5%  Consistent with diabetes  High levels of fetal hemoglobin  interfere with the HbA1C  assay. Heterozygous hemoglobin variants (HbS, HgC, etc)do  not significantly interfere with this assay.   However, presence of multiple variants may affect accuracy.              I have personally reviewed and interpreted the pertinent imaging and lab results.  Imaging Results          CT Head Without Contrast (Final result)  Result time 05/28/22 21:10:20    Final result by Gianluca Juarez MD (05/28/22 21:10:20)                 Impression:      Negative CT of the brain for hemorrhage mass or infarction.    Small area of high density posterior to the trapezius muscle.  Correlate for posterior neck skull junction soft tissue hematoma.      Electronically signed by: Gianluca Juarez  Date:    05/28/2022  Time:    21:10             Narrative:    EXAMINATION:  CT HEAD WITHOUT CONTRAST    CLINICAL HISTORY:  Seizure, new-onset, no history of trauma;    TECHNIQUE:  Low dose axial images were obtained through the head.  Coronal and sagittal reformations were also performed. Contrast was not administered.    COMPARISON:  None.    FINDINGS:  There is a 2.2 x 1.1 cm area of high density in the subcutaneous tissues posterior to the trapezius muscle at the level of the internal occipital protuberance.  The remainder of the scalp and calvarium appear unremarkable.    The brain parenchyma appears normal in attenuation with normal gray-white matter differentiation and no mass effect or pathologic fluid collection.  There is no ventriculomegaly.    Mild mucosal thickening in the left maxillary sinus is present.  No air-fluid levels are seen.  The orbits and orbital contents appear unremarkable.                                        ASSESSMENT AND PLAN:    Posterior reversible encephalopathy syndrome  Seizure  Hypoglycemia  Hypertension     Workup:   CT head:  No acute intracranial abnormality  MRI brain:  Bilateral symmetric FLAIR hyperintensity in occipital lobes.  DWI images did not reveal any  infarction.Findings concerning for posterior reversible encephalopathy syndrome  EEG:  Severe encephalopathy without epileptiform activity or seizures     Plan:  · Etiology of seizures likely secondary to PRES and hypoglycemia.  · Given MRI findings of posterior reversible encephalopathy syndrome at high risk of recurrent seizures, patient was started on Keppra maintenance dose of 500 b.i.d..  · Strict control blood pressure less than 140 systolic.  Patient currently on nicardipine drip.  Titrate to goal blood pressure.  Nephrology following and managing blood pressure.  ·  After weaning off nicardipine, patient can be moved out of the ICU.  · Seizure precautions.  · Neuro checks per unit protocol.  · Physical and Occupational therapy evaluate and treat.  · Avoid medications that lower seizure threshold.  · Repeat MRI in 3 months to look for resolution of PRES changes  · Will continue to follow     Seizure education.       No driving for now, no swimming alone, no climbing high areas, no operation of heavy machinery or working with high risk electricity equipment.        Patient and/or next of kin informed.  Follow up Neurology in 2 weeks. Call [phone] to make an appointment.        Thank you kindly for including us in the care of this patient. Please do not hesitate to contact us with any questions.    Critical Care:  35 minutes of critical care time has been spent evaluating with the patient. Time includes chart review not limited to diagnostic imaging, labs, and vitals, patient assessment, discussion with family and nursing, current order evaluations, and new order entries.      Neptali Marley MD  Neurology/vascular Neurology  Date of Service: 06/03/2022  10:35 AM    Please note: This note was transcribed using voice recognition software. Because of this technology there are often uinintended grammatical, spelling, and other transcription errors. Please disregard these errors.

## 2022-06-05 PROBLEM — E08.8 DIABETES MELLITUS DUE TO UNDERLYING CONDITION WITH UNSPECIFIED COMPLICATIONS: Status: RESOLVED | Noted: 2022-04-12 | Resolved: 2022-06-05

## 2022-06-05 PROBLEM — E87.1 HYPONATREMIA: Status: RESOLVED | Noted: 2020-09-10 | Resolved: 2022-06-05

## 2022-06-05 LAB
ALBUMIN SERPL BCP-MCNC: 1.7 G/DL (ref 3.5–5.2)
ALP SERPL-CCNC: 62 U/L (ref 55–135)
ALT SERPL W/O P-5'-P-CCNC: 11 U/L (ref 10–44)
ANION GAP SERPL CALC-SCNC: 11 MMOL/L (ref 8–16)
AST SERPL-CCNC: 11 U/L (ref 10–40)
BASOPHILS # BLD AUTO: 0.04 K/UL (ref 0–0.2)
BASOPHILS NFR BLD: 0.4 % (ref 0–1.9)
BILIRUB SERPL-MCNC: 0.6 MG/DL (ref 0.1–1)
BUN SERPL-MCNC: 54 MG/DL (ref 6–20)
CALCIUM SERPL-MCNC: 8.3 MG/DL (ref 8.7–10.5)
CHLORIDE SERPL-SCNC: 105 MMOL/L (ref 95–110)
CO2 SERPL-SCNC: 21 MMOL/L (ref 23–29)
CREAT SERPL-MCNC: 4.3 MG/DL (ref 0.5–1.4)
DIFFERENTIAL METHOD: ABNORMAL
EOSINOPHIL # BLD AUTO: 0.1 K/UL (ref 0–0.5)
EOSINOPHIL NFR BLD: 1.2 % (ref 0–8)
ERYTHROCYTE [DISTWIDTH] IN BLOOD BY AUTOMATED COUNT: 12.7 % (ref 11.5–14.5)
EST. GFR  (AFRICAN AMERICAN): 15 ML/MIN/1.73 M^2
EST. GFR  (NON AFRICAN AMERICAN): 13 ML/MIN/1.73 M^2
GLUCOSE SERPL-MCNC: 128 MG/DL (ref 70–110)
HCT VFR BLD AUTO: 28.1 % (ref 37–48.5)
HGB BLD-MCNC: 9.7 G/DL (ref 12–16)
IMM GRANULOCYTES # BLD AUTO: 0.12 K/UL (ref 0–0.04)
IMM GRANULOCYTES NFR BLD AUTO: 1.2 % (ref 0–0.5)
LYMPHOCYTES # BLD AUTO: 1.3 K/UL (ref 1–4.8)
LYMPHOCYTES NFR BLD: 12.9 % (ref 18–48)
MAGNESIUM SERPL-MCNC: 2.1 MG/DL (ref 1.6–2.6)
MCH RBC QN AUTO: 28 PG (ref 27–31)
MCHC RBC AUTO-ENTMCNC: 34.5 G/DL (ref 32–36)
MCV RBC AUTO: 81 FL (ref 82–98)
MONOCYTES # BLD AUTO: 0.8 K/UL (ref 0.3–1)
MONOCYTES NFR BLD: 8.1 % (ref 4–15)
NEUTROPHILS # BLD AUTO: 7.5 K/UL (ref 1.8–7.7)
NEUTROPHILS NFR BLD: 76.2 % (ref 38–73)
NRBC BLD-RTO: 0 /100 WBC
PHOSPHATE SERPL-MCNC: 4.8 MG/DL (ref 2.7–4.5)
PLATELET # BLD AUTO: 166 K/UL (ref 150–450)
PMV BLD AUTO: 9.2 FL (ref 9.2–12.9)
POCT GLUCOSE: 118 MG/DL (ref 70–110)
POCT GLUCOSE: 159 MG/DL (ref 70–110)
POCT GLUCOSE: 80 MG/DL (ref 70–110)
POTASSIUM SERPL-SCNC: 4.5 MMOL/L (ref 3.5–5.1)
PROT SERPL-MCNC: 4.8 G/DL (ref 6–8.4)
RBC # BLD AUTO: 3.46 M/UL (ref 4–5.4)
SODIUM SERPL-SCNC: 137 MMOL/L (ref 136–145)
WBC # BLD AUTO: 9.88 K/UL (ref 3.9–12.7)

## 2022-06-05 PROCEDURE — 63600175 PHARM REV CODE 636 W HCPCS: Performed by: STUDENT IN AN ORGANIZED HEALTH CARE EDUCATION/TRAINING PROGRAM

## 2022-06-05 PROCEDURE — 83735 ASSAY OF MAGNESIUM: CPT | Performed by: STUDENT IN AN ORGANIZED HEALTH CARE EDUCATION/TRAINING PROGRAM

## 2022-06-05 PROCEDURE — 97116 GAIT TRAINING THERAPY: CPT

## 2022-06-05 PROCEDURE — 94761 N-INVAS EAR/PLS OXIMETRY MLT: CPT

## 2022-06-05 PROCEDURE — 63600175 PHARM REV CODE 636 W HCPCS: Performed by: INTERNAL MEDICINE

## 2022-06-05 PROCEDURE — 97530 THERAPEUTIC ACTIVITIES: CPT

## 2022-06-05 PROCEDURE — 12000002 HC ACUTE/MED SURGE SEMI-PRIVATE ROOM

## 2022-06-05 PROCEDURE — 80053 COMPREHEN METABOLIC PANEL: CPT | Performed by: STUDENT IN AN ORGANIZED HEALTH CARE EDUCATION/TRAINING PROGRAM

## 2022-06-05 PROCEDURE — 63600175 PHARM REV CODE 636 W HCPCS: Performed by: NURSE PRACTITIONER

## 2022-06-05 PROCEDURE — 84100 ASSAY OF PHOSPHORUS: CPT | Performed by: STUDENT IN AN ORGANIZED HEALTH CARE EDUCATION/TRAINING PROGRAM

## 2022-06-05 PROCEDURE — 85025 COMPLETE CBC W/AUTO DIFF WBC: CPT | Performed by: STUDENT IN AN ORGANIZED HEALTH CARE EDUCATION/TRAINING PROGRAM

## 2022-06-05 PROCEDURE — 25000003 PHARM REV CODE 250: Performed by: STUDENT IN AN ORGANIZED HEALTH CARE EDUCATION/TRAINING PROGRAM

## 2022-06-05 PROCEDURE — 99900035 HC TECH TIME PER 15 MIN (STAT)

## 2022-06-05 PROCEDURE — 25000003 PHARM REV CODE 250: Performed by: HOSPITALIST

## 2022-06-05 PROCEDURE — C9113 INJ PANTOPRAZOLE SODIUM, VIA: HCPCS | Performed by: STUDENT IN AN ORGANIZED HEALTH CARE EDUCATION/TRAINING PROGRAM

## 2022-06-05 RX ORDER — FLUOXETINE HYDROCHLORIDE 20 MG/1
20 CAPSULE ORAL DAILY
Status: DISCONTINUED | OUTPATIENT
Start: 2022-06-05 | End: 2022-06-07 | Stop reason: HOSPADM

## 2022-06-05 RX ORDER — CLONAZEPAM 0.5 MG/1
0.5 TABLET ORAL 2 TIMES DAILY PRN
Status: DISCONTINUED | OUTPATIENT
Start: 2022-06-05 | End: 2022-06-07 | Stop reason: HOSPADM

## 2022-06-05 RX ORDER — ISOSORBIDE MONONITRATE 60 MG/1
60 TABLET, EXTENDED RELEASE ORAL DAILY
Status: DISCONTINUED | OUTPATIENT
Start: 2022-06-05 | End: 2022-06-07 | Stop reason: HOSPADM

## 2022-06-05 RX ORDER — LORAZEPAM 2 MG/ML
1 INJECTION INTRAMUSCULAR ONCE
Status: COMPLETED | OUTPATIENT
Start: 2022-06-05 | End: 2022-06-05

## 2022-06-05 RX ORDER — HEPARIN SODIUM 5000 [USP'U]/ML
5000 INJECTION, SOLUTION INTRAVENOUS; SUBCUTANEOUS EVERY 8 HOURS
Status: DISCONTINUED | OUTPATIENT
Start: 2022-06-05 | End: 2022-06-07 | Stop reason: HOSPADM

## 2022-06-05 RX ADMIN — FLUOXETINE 20 MG: 20 CAPSULE ORAL at 11:06

## 2022-06-05 RX ADMIN — HEPARIN SODIUM 5000 UNITS: 5000 INJECTION INTRAVENOUS; SUBCUTANEOUS at 02:06

## 2022-06-05 RX ADMIN — HYDRALAZINE HYDROCHLORIDE 100 MG: 25 TABLET, FILM COATED ORAL at 10:06

## 2022-06-05 RX ADMIN — ISOSORBIDE MONONITRATE 60 MG: 60 TABLET, EXTENDED RELEASE ORAL at 09:06

## 2022-06-05 RX ADMIN — HYDRALAZINE HYDROCHLORIDE 100 MG: 25 TABLET, FILM COATED ORAL at 06:06

## 2022-06-05 RX ADMIN — HEPARIN SODIUM 5000 UNITS: 5000 INJECTION INTRAVENOUS; SUBCUTANEOUS at 10:06

## 2022-06-05 RX ADMIN — LEVETIRACETAM 500 MG: 100 INJECTION INTRAVENOUS at 08:06

## 2022-06-05 RX ADMIN — LEVETIRACETAM 500 MG: 100 INJECTION INTRAVENOUS at 09:06

## 2022-06-05 RX ADMIN — PANTOPRAZOLE SODIUM 40 MG: 40 INJECTION, POWDER, LYOPHILIZED, FOR SOLUTION INTRAVENOUS at 08:06

## 2022-06-05 RX ADMIN — FUROSEMIDE 60 MG: 10 INJECTION, SOLUTION INTRAVENOUS at 08:06

## 2022-06-05 RX ADMIN — LORAZEPAM 1 MG: 2 INJECTION INTRAMUSCULAR; INTRAVENOUS at 01:06

## 2022-06-05 RX ADMIN — HYDRALAZINE HYDROCHLORIDE 100 MG: 25 TABLET, FILM COATED ORAL at 02:06

## 2022-06-05 RX ADMIN — CLONAZEPAM 0.5 MG: 0.5 TABLET ORAL at 06:06

## 2022-06-05 RX ADMIN — HYDROCHLOROTHIAZIDE 25 MG: 25 TABLET ORAL at 08:06

## 2022-06-05 RX ADMIN — FUROSEMIDE 60 MG: 10 INJECTION, SOLUTION INTRAVENOUS at 09:06

## 2022-06-05 NOTE — PLAN OF CARE
Pt resting in bed no s/s of distress vss. Pt verbalized no feelings of anxiety at this time. Cll light in reach fall precautions in place.

## 2022-06-05 NOTE — NURSING
"Patient crying and rocking back and forth after waking up at 0030. Requesting to "be comforted", request to "rub my back and my head." Refuses tylenol or a warm compress to her back (which was her initial complaints). States "I want my Daddy." Reassurance given. Questions answered and encouraged. Now requesting "something for anxiety and sleep." Angelina ELIZONDO notified and full update given. New orders received.   "

## 2022-06-05 NOTE — ASSESSMENT & PLAN NOTE
Patient has a current diagnosis of hypertensive urgency/emergency diagnosis: hypertensive emergency with end organ damage evidenced by hypertensive encephalopathy and acute kidney injury which is uncontrolled.  Latest blood pressure and vitals reviewed-   Temp:  [97.4 °F (36.3 °C)-99 °F (37.2 °C)]   Pulse:  []   Resp:  [12-35]   BP: (118-180)/()   SpO2:  [81 %-100 %] .   Patient currently on IV antihypertensives.     Medication adjustment for hospital antihypertensives is as follows- add hydrochlorothiazide and metoprolol, weaned down Cardene.    Will goal for controlled BP reduction as noted be medications noted above and monitor and mitigate end organ damage as indicated.

## 2022-06-05 NOTE — ASSESSMENT & PLAN NOTE
Induced by hypoglycemia and/or PRES.  -Neurology consulted  -Neuro checks Q4H  -Seizure, fall and aspiration precautions  -PRN IV Ativan  -BP control with HCTZ, hydralazine/nitrate  -Keppra

## 2022-06-05 NOTE — NURSING
"Awake. C/o back aches.  States "feels tight." Tylenol offered but refused. States "I want my Daddy!" in a childlike voice. "I want to go home, I miss my kids."  Encouraged to try to relax secondary to labile BP and encouraged tylenol for back aches. Continues to refuse. CB at side.   "

## 2022-06-05 NOTE — PLAN OF CARE
"Patient free of falls and injuries this shift. Awake alert and oriented x4. Follows some simple commands. Crying agitated/restless at the start of this shift. Able to relax and get some sleep. About 0030 last night this patient again began to cry and escalated to an episode of intense crying and upset because her back hurt. Refused any interventions (ie warm compress or prn pain medication). She continued to cry and state "I want y'all to stay with me and rub my head and back " for comfort. States "That is what I have to do at home." "My daddy rubs my back for me." Educated patient on prevention of possible dislodging of DVT. Verbalizes understanding. Continues to refuses any prn interventions. NP notified. Ativan x 1mg given. Returns to resting with closed eyes.  "

## 2022-06-05 NOTE — PLAN OF CARE
Problem: Physical Therapy  Goal: Physical Therapy Goal  Description: Goals to be met by: 2022     Patient will increase functional independence with mobility by performin. Supine to sit with MInimal Assistance  2. Sit to stand transfer with Minimal Assistance  3. Bed to chair transfer with Minimal Assistance using Rolling Walker  4. Gait  x 250 feet with Minimal Assistance using Rolling Walker.   5. Lower extremity exercise program x20 reps  Outcome: Ongoing, Progressing   Pt ambulated 60ft with RW min assist. Up to wheelchair, anticipating transfer to floor

## 2022-06-05 NOTE — PROGRESS NOTES
[facility] Hospital Medicine  Progress Note    Patient Name: Tabby Howard  MRN: [id number]  Patient Class: IP- Inpatient   Admission Date: 5/28/2022  Length of Stay: 8 days  Attending Physician: Raymundo Parker MD  Primary Care Provider: Primary Doctor No        Subjective:     Principal Problem:PRES (posterior reversible encephalopathy syndrome)        HPI:  Tabby Howard 32-year-old female presents emergency room for evaluation of seizure and hypoglycemia.  Patient has a history of diabetes and gastroparesis.  She reports that she has been taking her insulin but not able to hold any food down due to nausea and vomiting.  She was treated several times in the ER for hypoglycemia using dextrose 50%.  Just before my exam patient was reported to have clonic tonic seizure activity which lasted approximately 1 minute; however her glucose at the time was approximately 120. Previous medical history includes anemia, CKD, type 2 diabetes, gallstones, CKD, chronic diastolic heart failure, and gastroparesis.  ER workup:  CBC with thrombocytopenia of 125.  Original glucose on CMP was 48. BUN 46 and creatinine of 3.9 (baseline).  Urinalysis with 5 red blood cells and rare yeast.  CT the head was negative.  Patient was given Ativan status post seizure as well as given a Keppra loading dose.  Patient be admitted to Hospital Medicine for observation management.  Patient be placed in ICU given continue seizure and hypoglycemia.  Will consult Neurology in a.m..      Overview/Hospital Course:  Tabby Howard is a 32 year old female with a past medical history of IDDM, CKD IV, gastroparesis, and HFpEF who presented with seizure. She was discovered to be hypoglycemic and was started on a D5 infusion (since discontinued). Neurology was consulted. Her BP was observed to be elevated as well and she was started on a Cardene infusion. EEG of the brain did not show epileptiform activity, yet MRI of the brain showed  "concern for PRES. Keppra was started per Neurology 5/30. Her mental status continued to improve. Nephrology was consulted 6/1 given the patient's progressive CKD. Savannah was able to be weaned as she started on HCTZ, hydralazine and Imdur.  Nephrology has also scheduled IV Lasix in order to increase her urine output which has mildly improved her kidney function. She was able to be transferred out of the ICU 6/5/2022. She is working with PT/OT who recommends home health.      Interval History: see "Hospital Course"    Review of Systems   Constitutional:  Positive for unexpected weight change.   Genitourinary:  Negative for decreased urine volume.   Skin:  Positive for pallor.   Allergic/Immunologic: Positive for immunocompromised state.   Neurological:  Positive for weakness. Negative for seizures and headaches.   All other systems reviewed and are negative.  Objective:     Vital Signs (Most Recent):  Temp: 97.7 °F (36.5 °C) (06/04/22 2301)  Pulse: 100 (06/05/22 0755)  Resp: 14 (06/05/22 0630)  BP: (!) 144/96 (06/05/22 0630)  SpO2: 95 % (06/05/22 0755)   Vital Signs (24h Range):  Temp:  [97.4 °F (36.3 °C)-99 °F (37.2 °C)] 97.7 °F (36.5 °C)  Pulse:  [] 100  Resp:  [12-35] 14  SpO2:  [81 %-100 %] 95 %  BP: (118-180)/() 144/96     Weight: 80.2 kg (176 lb 12.9 oz)  Body mass index is 32.34 kg/m².    Intake/Output Summary (Last 24 hours) at 6/5/2022 0821  Last data filed at 6/5/2022 0623  Gross per 24 hour   Intake 913.43 ml   Output 1765 ml   Net -851.57 ml      Physical Exam  Vitals and nursing note reviewed.   Constitutional:       General: She is not in acute distress.     Appearance: She is well-developed. She is ill-appearing. She is not diaphoretic.      Comments: Anasarca.   HENT:      Head: Normocephalic and atraumatic.      Right Ear: External ear normal.      Left Ear: External ear normal.      Nose: Nose normal.   Eyes:      General: No scleral icterus.     Conjunctiva/sclera: Conjunctivae normal. "   Neck:      Vascular: No JVD.   Cardiovascular:      Rate and Rhythm: Normal rate and regular rhythm.      Pulses: Normal pulses.      Heart sounds: Normal heart sounds. No murmur heard.    No friction rub. No gallop.   Pulmonary:      Effort: Pulmonary effort is normal. No respiratory distress.      Breath sounds: Normal breath sounds. No wheezing or rales.   Abdominal:      General: Bowel sounds are normal. There is no distension.      Palpations: Abdomen is soft.      Tenderness: There is no abdominal tenderness. There is no guarding or rebound.   Genitourinary:     Comments: Pelayo.  Musculoskeletal:         General: No tenderness. Normal range of motion.      Cervical back: Neck supple.      Right lower leg: No edema.      Left lower leg: No edema.   Skin:     General: Skin is warm and dry.      Coloration: Skin is not pale.      Findings: No erythema or rash.   Neurological:      Mental Status: She is oriented to person, place, and time.       Significant Labs: All pertinent labs within the past 24 hours have been reviewed.    Significant Imaging: I have reviewed all pertinent imaging results/findings within the past 24 hours.      Assessment/Plan:      * PRES (posterior reversible encephalopathy syndrome)  -Seizure precautions  -PRN IV Ativan  -BP control with PO medications; continue to titrate  -Neurology following; will need repeat MRI in three months  -Keppra  -Neurologic checks    Seizure  Induced by hypoglycemia and/or PRES.  -Neurology consulted  -Neuro checks Q4H  -Seizure, fall and aspiration precautions  -PRN IV Ativan  -BP control with HCTZ, hydralazine/nitrate  -Keppra        Gastroparesis  Chronic.  -Renal and diabetic diet  -Hold Reglan as it lowers seizure threshold      CKD (chronic kidney disease), stage IV  -Nephrology following  -Renally dose all medications  -Avoid nephrotoxic agents  -Monitor UOP and electrolytes  -Trend creatinine  -Continue HCTZ and IV Lasix        Anemia of chronic  kidney failure  -Trend with CBC        Type II diabetes mellitus  -SSI  -Detemir 10  -Diabetic renal diet  -AC HS glucose checks  -Hypoglycemic precautions      VTE Risk Mitigation (From admission, onward)         Ordered     heparin (porcine) injection 5,000 Units  Every 8 hours         06/05/22 0816     IP VTE LOW RISK PATIENT  Once         05/29/22 0004     Place sequential compression device  Until discontinued         05/29/22 0004     Place JOCELYNE hose  Until discontinued         05/29/22 0004                Discharge Planning   TATIANA: 6/3/2022     Code Status: Full Code   Is the patient medically ready for discharge?:     Reason for patient still in hospital (select all that apply): Patient trending condition, Treatment and Consult recommendations  Discharge Plan A: Home with family            Critical care time spent on the evaluation and treatment of severe organ dysfunction, review of pertinent labs and imaging studies, discussions with consulting providers and discussions with patient/family: 32 minutes.      Raymundo Parker MD  Department of Hospital Medicine   [facility]

## 2022-06-05 NOTE — ASSESSMENT & PLAN NOTE
-Seizure precautions  -PRN IV Ativan  -BP control with PO medications; continue to titrate  -Neurology following; will need repeat MRI in three months  -Keppra  -Neurologic checks

## 2022-06-05 NOTE — NURSING
Transferred to room 201 via w/c. Tele monitor on. Belongings bag/cell phone and  with pt.   Report given to Prabhjot

## 2022-06-05 NOTE — SUBJECTIVE & OBJECTIVE
"Interval History: see "Hospital Course"    Review of Systems   Constitutional:  Positive for unexpected weight change.   Genitourinary:  Negative for decreased urine volume.   Skin:  Positive for pallor.   Allergic/Immunologic: Positive for immunocompromised state.   Neurological:  Positive for weakness. Negative for seizures and headaches.   All other systems reviewed and are negative.  Objective:     Vital Signs (Most Recent):  Temp: 97.7 °F (36.5 °C) (06/04/22 2301)  Pulse: 100 (06/05/22 0755)  Resp: 14 (06/05/22 0630)  BP: (!) 144/96 (06/05/22 0630)  SpO2: 95 % (06/05/22 0755)   Vital Signs (24h Range):  Temp:  [97.4 °F (36.3 °C)-99 °F (37.2 °C)] 97.7 °F (36.5 °C)  Pulse:  [] 100  Resp:  [12-35] 14  SpO2:  [81 %-100 %] 95 %  BP: (118-180)/() 144/96     Weight: 80.2 kg (176 lb 12.9 oz)  Body mass index is 32.34 kg/m².    Intake/Output Summary (Last 24 hours) at 6/5/2022 0821  Last data filed at 6/5/2022 0623  Gross per 24 hour   Intake 913.43 ml   Output 1765 ml   Net -851.57 ml      Physical Exam  Vitals and nursing note reviewed.   Constitutional:       General: She is not in acute distress.     Appearance: She is well-developed. She is ill-appearing. She is not diaphoretic.      Comments: Anasarca.   HENT:      Head: Normocephalic and atraumatic.      Right Ear: External ear normal.      Left Ear: External ear normal.      Nose: Nose normal.   Eyes:      General: No scleral icterus.     Conjunctiva/sclera: Conjunctivae normal.   Neck:      Vascular: No JVD.   Cardiovascular:      Rate and Rhythm: Normal rate and regular rhythm.      Pulses: Normal pulses.      Heart sounds: Normal heart sounds. No murmur heard.    No friction rub. No gallop.   Pulmonary:      Effort: Pulmonary effort is normal. No respiratory distress.      Breath sounds: Normal breath sounds. No wheezing or rales.   Abdominal:      General: Bowel sounds are normal. There is no distension.      Palpations: Abdomen is soft.      " Tenderness: There is no abdominal tenderness. There is no guarding or rebound.   Genitourinary:     Comments: Pelayo.  Musculoskeletal:         General: No tenderness. Normal range of motion.      Cervical back: Neck supple.      Right lower leg: No edema.      Left lower leg: No edema.   Skin:     General: Skin is warm and dry.      Coloration: Skin is not pale.      Findings: No erythema or rash.   Neurological:      Mental Status: She is oriented to person, place, and time.       Significant Labs: All pertinent labs within the past 24 hours have been reviewed.    Significant Imaging: I have reviewed all pertinent imaging results/findings within the past 24 hours.

## 2022-06-05 NOTE — PT/OT/SLP PROGRESS
Physical Therapy Treatment    Patient Name:  Tabby Howard   MRN:  [id number]    Recommendations:     Discharge Recommendations:  home health PT   Discharge Equipment Recommendations: none   Barriers to discharge: None    Assessment:     Tabby Howard is a 32 y.o. female admitted with a medical diagnosis of PRES (posterior reversible encephalopathy syndrome).  She presents with the following impairments/functional limitations:  weakness, impaired endurance, impaired functional mobilty, gait instability, impaired balance, impaired cognition, decreased lower extremity function, decreased safety awareness, pain, edema, impaired cardiopulmonary response to activity .    Pt seen at ICU- asleep, easily awakened- stated not sleeping well and back hurts. Pt requiring encouragement to mobilize- ambulated 60ft with RW min assist and up to wheelchair- anticipating transfer to floor.    Rehab Prognosis: Fair; patient would benefit from acute skilled PT services to address these deficits and reach maximum level of function.    Recent Surgery: * No surgery found *      Plan:     During this hospitalization, patient to be seen 6 x/week to address the identified rehab impairments via gait training, therapeutic activities, therapeutic exercises and progress toward the following goals:    · Plan of Care Expires:  06/30/22    Subjective   Nurse sanchez stated pt transferring to floor  Chief Complaint: back hurts, stated of being anxious- pt making crying noises  Patient/Family Comments/goals: get home  Pain/Comfort:  · Pain Rating 1:  (not rated)  · Location 1: back  · Pain Addressed 1: Reposition, Distraction, Cessation of Activity      Objective:     Communicated with nurse Sanchez prior to session.  Patient found left sidelying with telemetry, pulse ox (continuous), blood pressure cuff, bed alarm, washington catheter, peripheral IV upon PT entry to room.     General Precautions: Standard, diabetic, seizure   Orthopedic Precautions:N/A    Braces: N/A  Respiratory Status: Room air     Functional Mobility:  · Bed Mobility:     · Rolling Left:  modified independence  · Scooting: contact guard assistance  · Supine to Sit: contact guard assistance  · Transfers:     · Sit to Stand:  minimum assistance with rolling walker  · Bed to Chair: minimum assistance with  rolling walker  using  Stand Pivot  · Gait: 60ft with RW min assist with assist maneuvering RW safely      AM-PAC 6 CLICK MOBILITY          Therapeutic Activities and Exercises:   Patient was educated on the importance of OOB activity and functional mobility to negate negative effects of prolonged bed rest during hospitalization, safe transfers and ambulation, and D/C planning   OOB wheelchair post PT for transfer to floor    Patient left up in chair with all lines intact, call button in reach, chair alarm on and nurse Maggie present..    GOALS:   Multidisciplinary Problems     Physical Therapy Goals        Problem: Physical Therapy    Goal Priority Disciplines Outcome Goal Variances Interventions   Physical Therapy Goal     PT, PT/OT Ongoing, Progressing     Description: Goals to be met by: 2022     Patient will increase functional independence with mobility by performin. Supine to sit with MInimal Assistance  2. Sit to stand transfer with Minimal Assistance  3. Bed to chair transfer with Minimal Assistance using Rolling Walker  4. Gait  x 250 feet with Minimal Assistance using Rolling Walker.   5. Lower extremity exercise program x20 reps                   Time Tracking:     PT Received On: 22  PT Start Time: 941     PT Stop Time: 1005  PT Total Time (min): 24 min     Billable Minutes: Gait Training 10 and Therapeutic Activity 14    Treatment Type: Treatment  PT/PTA: PT     PTA Visit Number: 0     2022

## 2022-06-05 NOTE — PROGRESS NOTES
"Nephrology Progress Note        Patient Name: Tabby Howard  MRN: [id number]    Patient Class: IP- Inpatient   Admission Date: 5/28/2022  Length of Stay: 8 days  Date of Service: 6/5/2022    Attending Physician: Raymundo Parker MD  Primary Care Provider: Primary Doctor No    Reason for Consult: ckd4/acidosis/hyponatremia/anemia/htn/edema    SUBJECTIVE:     HPI: 32F with anemia, CKD, type 2 diabetes, gallstones, CKD 4, chronic diastolic heart failure, gastroparesis admitted on 5/29 for seizure and hypoglycemia. She has been taking her insulin but not able to hold any food down due to nausea and vomiting. She was treated several times in the ER for hypoglycemia, but had a witnessed clonic-tonic seizure for about 1 minute. Received Ativan and Keppra. Lisnoprill was topped today after 2 doses, probably over concern for worsening sCr. Amodipine and Clonidine was added in addition to nicardipine gtt. UO is low as documented and she is net positive since admission. Renal is consulted for co-management.    6/2 VSS, no new complains. UO still low, BP is up - will increase diuretics to 60 mg BID.  6/3 VSS, keep diuretic BID and titrate Nicardipine to MAP > 60, let BP ride a little higher to help diuresis. sCr is up but stable. DO not add oral BP meds yet as I am attempting diuresis and too much BP control can cause KANWAL.  6/4  UOP 1.4L.  Scr trended down a little.  Still requiring cardene gtt.  Edemetous, getting IV lasix bid and HCTZ.  Looking at phone, no complaints.  6/5  Renal function w/small improvement.  Pressures looking better so far today.  Na+ now in range.  1.9L UOP.  Off cardene gtt, on hydralazine per primary team.  Tearful today, had "a bad night".    Past Medical History:   Diagnosis Date    CKD (chronic kidney disease), stage IV 4/12/2022    Diabetes mellitus due to underlying condition with unspecified complications 4/12/2022    Gastroparesis 4/12/2022    Heart failure with preserved ejection fraction " 2022    EF 55% on 3/22    History of gastroesophageal reflux (GERD)     History of supraventricular tachycardia     Sickle cell trait 2022    Type 2 diabetes mellitus      Past Surgical History:   Procedure Laterality Date     SECTION      x 3    ESOPHAGOGASTRODUODENOSCOPY N/A 10/18/2019    Procedure: ESOPHAGOGASTRODUODENOSCOPY (EGD);  Surgeon: Gianluca Mendez MD;  Location: [facility];  Service: Endoscopy;  Laterality: N/A;     Family History   Problem Relation Age of Onset    Diabetes Mother     Diabetes Father      Social History     Tobacco Use    Smoking status: Never Smoker    Smokeless tobacco: Never Used   Substance Use Topics    Alcohol use: No    Drug use: No       Review of patient's allergies indicates:   Allergen Reactions    Penicillins Hives       Outpatient meds:  No current facility-administered medications on file prior to encounter.     Current Outpatient Medications on File Prior to Encounter   Medication Sig Dispense Refill    acetaminophen (TYLENOL) 325 MG tablet Take 2 tablets (650 mg total) by mouth every 6 (six) hours as needed for Pain or Temperature greater than (100.3). 30 tablet 0    blood-glucose meter kit Use as instructed 1 each 0    cephALEXin (KEFLEX) 500 MG capsule Take 500 mg by mouth 4 (four) times daily.      doxycycline (VIBRAMYCIN) 100 MG Cap Take 100 mg by mouth 2 (two) times daily.      ibuprofen (ADVIL,MOTRIN) 600 MG tablet Take 1 tablet (600 mg total) by mouth every 6 (six) hours as needed for Pain. 20 tablet 0    insulin (BASAGLAR KWIKPEN U-100 INSULIN) glargine 100 units/mL (3mL) SubQ pen Inject 28 Units into the skin every evening. 25.2 mL 3    insulin glulisine U-100 (APIDRA U-100 INSULIN) 100 unit/mL injection Inject 8 Units into the skin 3 (three) times daily before meals. 21.6 mL 3    metoclopramide HCl (REGLAN) 10 MG tablet Take 1 tablet (10 mg total) by mouth every 6 (six) hours as needed (headache, nausea or vomiting). 30  tablet 0    ondansetron (ZOFRAN) 4 MG tablet Take 1 tablet (4 mg total) by mouth every 6 (six) hours as needed for Nausea. 30 tablet 0    ondansetron (ZOFRAN-ODT) 4 MG TbDL Take 1 tablet (4 mg total) by mouth every 6 (six) hours as needed (nausea or vomiting). 12 tablet 0    oxyCODONE (ROXICODONE) 5 MG immediate release tablet Take 1 tablet (5 mg total) by mouth every 6 (six) hours as needed for Pain. 20 tablet 0    pantoprazole (PROTONIX) 40 MG tablet Take 1 tablet (40 mg total) by mouth once daily. 30 tablet 3    polyethylene glycol (GLYCOLAX) 17 gram PwPk Take 17 g by mouth once daily. 30 each 1    promethazine (PHENERGAN) 25 MG suppository Place 1 suppository (25 mg total) rectally every 6 (six) hours as needed for Nausea (For severe nausea and vomiting not improved with oral medications). 10 suppository 0    senna-docusate 8.6-50 mg (SENNA WITH DOCUSATE SODIUM) 8.6-50 mg per tablet Take 1 tablet by mouth daily as needed for Constipation. 30 tablet 1       Scheduled meds:   furosemide (LASIX) injection  60 mg Intravenous Q12H    hydrALAZINE  100 mg Oral Q8H    hydroCHLOROthiazide  25 mg Oral Daily    insulin detemir U-100  10 Units Subcutaneous Daily    isosorbide mononitrate  60 mg Oral Daily    levetiracetam IV  500 mg Intravenous Q12H    pantoprazole  40 mg Intravenous Daily       Infusions:   niCARdipine Stopped (06/04/22 0545)       PRN meds:  acetaminophen, albuterol-ipratropium, aluminum-magnesium hydroxide-simethicone, dextrose 10%, dextrose 10%, diphenhydrAMINE, glucagon (human recombinant), glucose, glucose, hydrALAZINE, insulin aspart U-100, magnesium oxide, magnesium oxide, melatonin, naloxone, ondansetron, potassium bicarbonate, potassium bicarbonate, potassium bicarbonate, potassium, sodium phosphates, potassium, sodium phosphates, potassium, sodium phosphates, simethicone, sodium chloride 0.9%    Review of Systems:    OBJECTIVE:     Vital Signs and IO (Last 24H):  Temp:  [97.4 °F  (36.3 °C)-99 °F (37.2 °C)]   Pulse:  []   Resp:  [12-35]   BP: (118-180)/()   SpO2:  [81 %-100 %]   I/O last 3 completed shifts:  In: 1515.6 [P.O.:1040; I.V.:475.6]  Out: 2940 [Urine:2940]    Wt Readings from Last 5 Encounters:   06/05/22 80.2 kg (176 lb 12.9 oz)   05/06/22 70.3 kg (155 lb)   04/11/22 65.8 kg (145 lb)   12/20/21 59.9 kg (132 lb)   05/04/21 65.6 kg (144 lb 11.7 oz)     Physical Exam:  Physical Exam  Constitutional:       Appearance: She is well-developed. She is not diaphoretic.   HENT:      Head: Normocephalic and atraumatic.   Eyes:      General: No scleral icterus.     Pupils: Pupils are equal, round, and reactive to light.   Cardiovascular:      Rate and Rhythm: Normal rate and regular rhythm.   Pulmonary:      Effort: Pulmonary effort is normal. No respiratory distress.      Breath sounds: No stridor.   Abdominal:      General: There is no distension.      Palpations: Abdomen is soft.   Musculoskeletal:         General: Swelling present. No deformity. Normal range of motion.      Cervical back: Neck supple.   Skin:     General: Skin is warm and dry.      Findings: No erythema or rash.   Neurological:      Mental Status: She is alert and oriented to person, place, and time.      Cranial Nerves: No cranial nerve deficit.   Psychiatric:         Behavior: Behavior normal.         Body mass index is 32.34 kg/m².    Laboratory:  Recent Labs   Lab 06/03/22 0344 06/04/22  0331 06/05/22  0455   * 134* 137   K 3.8 4.5 4.5    103 105   CO2 19* 19* 21*   BUN 50* 51* 54*   CREATININE 4.7* 4.5* 4.3*   ESTGFRAFRICA 13* 14* 15*   EGFRNONAA 11* 12* 13*   * 110 128*       Recent Labs   Lab 06/03/22 0344 06/04/22  0331 06/05/22  0455   CALCIUM 7.8* 8.2* 8.3*   ALBUMIN 1.8* 1.8* 1.7*   PHOS 4.4 4.9* 4.8*   MG 2.0 2.1 2.1             Recent Labs   Lab 06/02/22  2139 06/03/22  0626 06/03/22  1141 06/03/22  1548 06/03/22  1837 06/03/22  2131 06/04/22  0849 06/04/22  1234 06/04/22  1826  06/04/22 2042   POCTGLUCOSE 182* 251* 136* 59* 108 115* 142* 151* 134* 130*       Recent Labs   Lab 01/26/22  0240 03/06/22  1135 05/15/22  1954   Hemoglobin A1C 6.8 H 5.3 5.8 H       Recent Labs   Lab 06/03/22  0344 06/04/22  0331 06/05/22  0455   WBC 9.93 9.36 9.88   HGB 9.6* 10.2* 9.7*   HCT 26.0* 27.7* 28.1*    190 166   MCV 80* 79* 81*   MCHC 36.9* 36.8* 34.5   MONO 12.0  1.2* 8.1  0.8 8.1  0.8       Recent Labs   Lab 06/03/22 0344 06/04/22 0331 06/05/22  0455   BILITOT 0.6 0.7 0.6   PROT 4.9* 5.4* 4.8*   ALBUMIN 1.8* 1.8* 1.7*   ALKPHOS 62 65 62   ALT 17 14 11   AST 19 24 11       Recent Labs   Lab 09/10/20  0355 09/10/20  0355 12/13/20  1702 04/23/21 2006 04/24/21  1259 05/28/22 2036   Color, UA Yellow  --  Red A Yellow Yellow Yellow   Appearance, UA Clear  --  Cloudy A Clear Clear Clear   pH, UA 8.0  --  6.0 6.0 6.0 7.0   Specific Gravity, UA 1.010  --  1.010 1.020 1.010 1.020   Protein, UA Negative  --  2+ A 3+ A 3+ A 3+ A   Glucose, UA 3+ A  --  3+ A 3+ A 3+ A 2+ A   Ketones, UA Trace A  --  1+ A 1+ A Trace A Negative   Urobilinogen, UA 1.0  --  1.0  --   --  Negative   Bilirubin (UA) Negative  --  Negative Negative Negative Negative   Occult Blood UA 1+ A  --  3+ A 2+ A 1+ A 2+ A   Nitrite, UA Negative  --  Positive A Negative Negative Negative   RBC, UA 2  --  >100 H 54 H 2 5 H   WBC, UA 12 H  --  2 44 H 5 1   Bacteria Many A  --  Few A Occasional Rare None   Hyaline Casts, UA  --    < > 0 0 0 0    < > = values in this interval not displayed.       Recent Labs   Lab 12/13/20 1042 04/23/21 1952 04/23/21 2006 04/11/22 2043 04/11/22  2345 04/12/22  0211   POC PH 7.457 H 7.458 H  --  7.365  --   --    POC PCO2 32.6 L 32.9 L  --  39.5  --   --    POC HCO3 23.0 L 23.3 L  --  22.6 L  --   --    POC PO2 26 LL 24 LL  --  25 L  --   --    POC SATURATED O2 53 L 47 L  --  42 L  --   --    POC BE -1 -1  --  -3  --   --    Sample VENOUS VENOUS   < > VENOUS VENOUS VENOUS    < > = values in this  interval not displayed.       Microbiology Results (last 7 days)     Procedure Component Value Units Date/Time    Clostridium difficile EIA [[id number]]     Order Status: No result Specimen: Stool         ASSESSMENT/PLAN:     CKD stage 4  Edema due to hypoalbuminemia  Hyponatremia  Acidosis  Uncontrolled DM  No NSAIDs, ACEI/ARB, IV contrast or other nephrotoxins.  Keep MAP > 60, SBP > 100.  Dose meds for GFR < 30 ml/min.  Renal diet - low K, low phos. Limit free water intake.  Hold oral BP meds, titrate diuretic as tolerated, keep washington with close IO. Tolerate -160 to help diuresis, keep nicardipine drip.  Control DM.    Anemia of CKD  Hgb and HCT are acceptable. Monitor.  Will provide SHELLEY and/or IV iron PRN.    HTN  BP seem controlled.   Tolerate asymptomatic HTN up to -160.  Stop all oral meds, keep Nicardipine, increase diuretic as tolerated to deal with edema, keep washington.    Thank you for allowing us to participate in the care of your patient!   We will follow the patient and provide recommendations as needed.    Patient care time was spent personally by me on the following activities:   · Obtaining a history.  · Examination of patient.  · Providing medical care at the patients bedside.  · Developing a treatment plan with patient or surrogate and bedside caregivers.  · Ordering and reviewing laboratory studies, radiographic studies, pulse oximetry.  · Ordering and performing treatments and interventions.  · Evaluation of patient's response to treatment.  · Discussions with consultants while on the unit and immediately available to the patient.  · Re-evaluation of the patient's condition.  · Documentation in the medical record.     Geeta Kaur NP      [facility]  [address] [place] [address]  JEANMARIE [place] [address]    [phone] - tel  [phone] - fax    6/5/2022

## 2022-06-05 NOTE — PROGRESS NOTES
Pt received to this nurse room 203 bedside report pt states she is having anxiety. Pt crying on all fours in bed talking on the phone with family notified md.

## 2022-06-05 NOTE — ASSESSMENT & PLAN NOTE
-Nephrology following  -Renally dose all medications  -Avoid nephrotoxic agents  -Monitor UOP and electrolytes  -Trend creatinine  -Continue HCTZ and IV Lasix

## 2022-06-05 NOTE — CARE UPDATE
06/05/22 0755   Patient Assessment/Suction   Level of Consciousness (AVPU)   (pt asleep)   Respiratory Effort Unlabored   Rhythm/Pattern, Respiratory unlabored   PRE-TX-O2   O2 Device (Oxygen Therapy) room air   Pulse Oximetry Type Continuous   $ Pulse Oximetry - Multiple Charge Pulse Oximetry - Multiple

## 2022-06-06 LAB
ALBUMIN SERPL BCP-MCNC: 1.7 G/DL (ref 3.5–5.2)
ALP SERPL-CCNC: 59 U/L (ref 55–135)
ALT SERPL W/O P-5'-P-CCNC: 11 U/L (ref 10–44)
ANION GAP SERPL CALC-SCNC: 10 MMOL/L (ref 8–16)
AST SERPL-CCNC: 13 U/L (ref 10–40)
BASOPHILS # BLD AUTO: 0.05 K/UL (ref 0–0.2)
BASOPHILS NFR BLD: 0.5 % (ref 0–1.9)
BILIRUB SERPL-MCNC: 0.6 MG/DL (ref 0.1–1)
BUN SERPL-MCNC: 50 MG/DL (ref 6–20)
CALCIUM SERPL-MCNC: 8.2 MG/DL (ref 8.7–10.5)
CHLORIDE SERPL-SCNC: 104 MMOL/L (ref 95–110)
CO2 SERPL-SCNC: 22 MMOL/L (ref 23–29)
CREAT SERPL-MCNC: 4 MG/DL (ref 0.5–1.4)
CREAT UR-MCNC: 69.5 MG/DL (ref 15–325)
DIFFERENTIAL METHOD: ABNORMAL
EOSINOPHIL # BLD AUTO: 0.2 K/UL (ref 0–0.5)
EOSINOPHIL NFR BLD: 2.1 % (ref 0–8)
ERYTHROCYTE [DISTWIDTH] IN BLOOD BY AUTOMATED COUNT: 12.8 % (ref 11.5–14.5)
EST. GFR  (AFRICAN AMERICAN): 16 ML/MIN/1.73 M^2
EST. GFR  (NON AFRICAN AMERICAN): 14 ML/MIN/1.73 M^2
GLUCOSE SERPL-MCNC: 148 MG/DL (ref 70–110)
HCT VFR BLD AUTO: 25.1 % (ref 37–48.5)
HGB BLD-MCNC: 8.9 G/DL (ref 12–16)
IMM GRANULOCYTES # BLD AUTO: 0.09 K/UL (ref 0–0.04)
IMM GRANULOCYTES NFR BLD AUTO: 1 % (ref 0–0.5)
LYMPHOCYTES # BLD AUTO: 1.3 K/UL (ref 1–4.8)
LYMPHOCYTES NFR BLD: 13.4 % (ref 18–48)
MAGNESIUM SERPL-MCNC: 2 MG/DL (ref 1.6–2.6)
MCH RBC QN AUTO: 28.6 PG (ref 27–31)
MCHC RBC AUTO-ENTMCNC: 35.5 G/DL (ref 32–36)
MCV RBC AUTO: 81 FL (ref 82–98)
MONOCYTES # BLD AUTO: 0.8 K/UL (ref 0.3–1)
MONOCYTES NFR BLD: 8.2 % (ref 4–15)
NEUTROPHILS # BLD AUTO: 7 K/UL (ref 1.8–7.7)
NEUTROPHILS NFR BLD: 74.8 % (ref 38–73)
NRBC BLD-RTO: 0 /100 WBC
PHOSPHATE SERPL-MCNC: 4.2 MG/DL (ref 2.7–4.5)
PLATELET # BLD AUTO: 180 K/UL (ref 150–450)
PMV BLD AUTO: 9.6 FL (ref 9.2–12.9)
POCT GLUCOSE: 149 MG/DL (ref 70–110)
POCT GLUCOSE: 161 MG/DL (ref 70–110)
POTASSIUM SERPL-SCNC: 4 MMOL/L (ref 3.5–5.1)
PROT SERPL-MCNC: 4.7 G/DL (ref 6–8.4)
PROT UR-MCNC: 308 MG/DL (ref 0–15)
RBC # BLD AUTO: 3.11 M/UL (ref 4–5.4)
SODIUM SERPL-SCNC: 136 MMOL/L (ref 136–145)
WBC # BLD AUTO: 9.31 K/UL (ref 3.9–12.7)

## 2022-06-06 PROCEDURE — 84156 ASSAY OF PROTEIN URINE: CPT | Performed by: INTERNAL MEDICINE

## 2022-06-06 PROCEDURE — 97116 GAIT TRAINING THERAPY: CPT

## 2022-06-06 PROCEDURE — 63600175 PHARM REV CODE 636 W HCPCS: Performed by: STUDENT IN AN ORGANIZED HEALTH CARE EDUCATION/TRAINING PROGRAM

## 2022-06-06 PROCEDURE — 84100 ASSAY OF PHOSPHORUS: CPT | Performed by: HOSPITALIST

## 2022-06-06 PROCEDURE — 25000003 PHARM REV CODE 250: Performed by: STUDENT IN AN ORGANIZED HEALTH CARE EDUCATION/TRAINING PROGRAM

## 2022-06-06 PROCEDURE — 85025 COMPLETE CBC W/AUTO DIFF WBC: CPT | Performed by: HOSPITALIST

## 2022-06-06 PROCEDURE — 82570 ASSAY OF URINE CREATININE: CPT | Performed by: INTERNAL MEDICINE

## 2022-06-06 PROCEDURE — 80053 COMPREHEN METABOLIC PANEL: CPT | Performed by: HOSPITALIST

## 2022-06-06 PROCEDURE — 99900035 HC TECH TIME PER 15 MIN (STAT)

## 2022-06-06 PROCEDURE — 83735 ASSAY OF MAGNESIUM: CPT | Performed by: HOSPITALIST

## 2022-06-06 PROCEDURE — 12000002 HC ACUTE/MED SURGE SEMI-PRIVATE ROOM

## 2022-06-06 PROCEDURE — 94761 N-INVAS EAR/PLS OXIMETRY MLT: CPT

## 2022-06-06 PROCEDURE — 36415 COLL VENOUS BLD VENIPUNCTURE: CPT | Performed by: HOSPITALIST

## 2022-06-06 PROCEDURE — C9113 INJ PANTOPRAZOLE SODIUM, VIA: HCPCS | Performed by: STUDENT IN AN ORGANIZED HEALTH CARE EDUCATION/TRAINING PROGRAM

## 2022-06-06 RX ADMIN — HEPARIN SODIUM 5000 UNITS: 5000 INJECTION INTRAVENOUS; SUBCUTANEOUS at 02:06

## 2022-06-06 RX ADMIN — LEVETIRACETAM 500 MG: 100 INJECTION INTRAVENOUS at 09:06

## 2022-06-06 RX ADMIN — HYDRALAZINE HYDROCHLORIDE 100 MG: 25 TABLET, FILM COATED ORAL at 05:06

## 2022-06-06 RX ADMIN — HYDRALAZINE HYDROCHLORIDE 100 MG: 25 TABLET, FILM COATED ORAL at 09:06

## 2022-06-06 RX ADMIN — PANTOPRAZOLE SODIUM 40 MG: 40 INJECTION, POWDER, LYOPHILIZED, FOR SOLUTION INTRAVENOUS at 08:06

## 2022-06-06 RX ADMIN — FLUOXETINE 20 MG: 20 CAPSULE ORAL at 08:06

## 2022-06-06 RX ADMIN — LEVETIRACETAM 500 MG: 100 INJECTION INTRAVENOUS at 08:06

## 2022-06-06 RX ADMIN — HEPARIN SODIUM 5000 UNITS: 5000 INJECTION INTRAVENOUS; SUBCUTANEOUS at 09:06

## 2022-06-06 RX ADMIN — ISOSORBIDE MONONITRATE 60 MG: 60 TABLET, EXTENDED RELEASE ORAL at 09:06

## 2022-06-06 RX ADMIN — HEPARIN SODIUM 5000 UNITS: 5000 INJECTION INTRAVENOUS; SUBCUTANEOUS at 05:06

## 2022-06-06 RX ADMIN — HYDRALAZINE HYDROCHLORIDE 100 MG: 25 TABLET, FILM COATED ORAL at 02:06

## 2022-06-06 RX ADMIN — INSULIN DETEMIR 10 UNITS: 100 INJECTION, SOLUTION SUBCUTANEOUS at 09:06

## 2022-06-06 RX ADMIN — DIPHENHYDRAMINE HYDROCHLORIDE 25 MG: 25 CAPSULE ORAL at 03:06

## 2022-06-06 RX ADMIN — FUROSEMIDE 60 MG: 10 INJECTION, SOLUTION INTRAVENOUS at 08:06

## 2022-06-06 NOTE — PT/OT/SLP PROGRESS
Physical Therapy Treatment    Patient Name:  Tabby Howard   MRN:  [id number]    Recommendations:     Discharge Recommendations:  home health PT (with assistance from family as patient has 3 daughters and will likely need increased family support upon D/C)   Discharge Equipment Recommendations: walker, rolling   Barriers to discharge: None    Assessment:     Tabby Howard is a 32 y.o. female admitted with a medical diagnosis of PRES (posterior reversible encephalopathy syndrome).  She presents with the following impairments/functional limitations:  weakness, impaired endurance, impaired functional mobilty, gait instability, impaired balance, decreased lower extremity function, decreased safety awareness. Patient lying in bed talking on the phone upon entering the room. Patient initially declines participation with PT treatment due to being cold, but became agreeable to ambulation with motivation provided by sister on the phone. She requires SBA for supine <> sit <> stand transfer. 1 posterior LOB with standing with patient not holding AD with Diamond for recovery. She ambulated 240' with RW, CGA-Diamond, and mild instability which increased with changing directions. Patient is agreeable to sit up in chair post-ambulation with chair alarm on and RN notified.     Rehab Prognosis: Good; patient would benefit from acute skilled PT services to address these deficits and reach maximum level of function.    Recent Surgery: * No surgery found *      Plan:     During this hospitalization, patient to be seen 6 x/week to address the identified rehab impairments via gait training, therapeutic activities, therapeutic exercises and progress toward the following goals:    · Plan of Care Expires:  06/30/22    Subjective     Chief Complaint: cold  Patient/Family Comments/goals: agrees to walk with motivation provided by PT and family on the phone   Pain/Comfort:  · Pain Rating 1: 0/10      Objective:     Communicated with SHAILA Serrano prior to  session.  Patient found HOB elevated with bed alarm, telemetry, washington catheter upon PT entry to room.     General Precautions: Standard, diabetic, fall, seizure   Orthopedic Precautions:N/A   Braces: N/A  Respiratory Status: Room air     Functional Mobility:  · Bed Mobility:     · Supine to Sit: stand by assistance  · Transfers:     · Sit to Stand:  stand by assistance with no AD  · Gait: 240' with RW, CGA-Diamond, and mild instability which increased with changing directions      AM-PAC 6 CLICK MOBILITY  Turning over in bed (including adjusting bedclothes, sheets and blankets)?: 4  Sitting down on and standing up from a chair with arms (e.g., wheelchair, bedside commode, etc.): 4  Moving from lying on back to sitting on the side of the bed?: 4  Moving to and from a bed to a chair (including a wheelchair)?: 3  Need to walk in hospital room?: 3  Climbing 3-5 steps with a railing?: 3  Basic Mobility Total Score: 21       Therapeutic Activities and Exercises:   Patient was educated on the importance of OOB activity and functional mobility to negate negative effects of prolonged bed rest during hospitalization, safe transfers and ambulation, and D/C planning     Patient left up in chair with all lines intact, call button in reach, chair alarm on and RN notified..    GOALS:   Multidisciplinary Problems     Physical Therapy Goals        Problem: Physical Therapy    Goal Priority Disciplines Outcome Goal Variances Interventions   Physical Therapy Goal     PT, PT/OT Ongoing, Progressing     Description: Goals to be met by: 2022     Patient will increase functional independence with mobility by performin. Supine to sit with MInimal Assistance  2. Sit to stand transfer with Minimal Assistance  3. Bed to chair transfer with Minimal Assistance using Rolling Walker  4. Gait  x 250 feet with Minimal Assistance using Rolling Walker.   5. Lower extremity exercise program x20 reps                   Time Tracking:     PT  Received On: 06/06/22  PT Start Time: 1444     PT Stop Time: 1505  PT Total Time (min): 21 min     Billable Minutes: Gait Training 21    Treatment Type: Treatment  PT/PTA: PT     PTA Visit Number: 0     06/06/2022

## 2022-06-06 NOTE — PLAN OF CARE
Problem: Adult Inpatient Plan of Care  Goal: Plan of Care Review  Outcome: Ongoing, Progressing  Goal: Patient-Specific Goal (Individualized)  Outcome: Ongoing, Progressing  Goal: Optimal Comfort and Wellbeing  Outcome: Ongoing, Progressing     Problem: Diabetes Comorbidity  Goal: Blood Glucose Level Within Targeted Range  Outcome: Ongoing, Progressing     Problem: Adjustment to Illness (Delirium)  Goal: Optimal Coping  Outcome: Ongoing, Progressing     Problem: Altered Behavior (Delirium)  Goal: Improved Behavioral Control  Outcome: Ongoing, Progressing

## 2022-06-06 NOTE — PROGRESS NOTES
"Nephrology Progress Note        Patient Name: Tabby Howard  MRN: [id number]    Patient Class: IP- Inpatient   Admission Date: 5/28/2022  Length of Stay: 9 days  Date of Service: 6/6/2022    Attending Physician: Linda Cueto MD  Primary Care Provider: Primary Doctor No    Reason for Consult: ckd4/acidosis/hyponatremia/anemia/htn/edema    SUBJECTIVE:     HPI: 32F with anemia, CKD, type 2 diabetes, gallstones, CKD 4, chronic diastolic heart failure, gastroparesis admitted on 5/29 for seizure and hypoglycemia. She has been taking her insulin but not able to hold any food down due to nausea and vomiting. She was treated several times in the ER for hypoglycemia, but had a witnessed clonic-tonic seizure for about 1 minute. Received Ativan and Keppra. Lisnoprill was topped today after 2 doses, probably over concern for worsening sCr. Amodipine and Clonidine was added in addition to nicardipine gtt. UO is low as documented and she is net positive since admission. Renal is consulted for co-management.    6/2 VSS, no new complains. UO still low, BP is up - will increase diuretics to 60 mg BID.  6/3 VSS, keep diuretic BID and titrate Nicardipine to MAP > 60, let BP ride a little higher to help diuresis. sCr is up but stable. DO not add oral BP meds yet as I am attempting diuresis and too much BP control can cause KANWAL.  6/4  UOP 1.4L.  Scr trended down a little.  Still requiring cardene gtt.  Edemetous, getting IV lasix bid and HCTZ.  Looking at phone, no complaints.  6/5  Renal function w/small improvement.  Pressures looking better so far today.  Na+ now in range.  1.9L UOP.  Off cardene gtt, on hydralazine per primary team.  Tearful today, had "a bad night".  6/6  2.1L UOP recorded.  No chemistry results yet this am.  Pressures still a little high, likely fluid related-- diuresing.    Past Medical History:   Diagnosis Date    CKD (chronic kidney disease), stage IV 4/12/2022    Diabetes mellitus due to underlying " condition with unspecified complications 2022    Gastroparesis 2022    Heart failure with preserved ejection fraction 2022    EF 55% on 3/22    History of gastroesophageal reflux (GERD)     History of supraventricular tachycardia     Sickle cell trait 2022    Type 2 diabetes mellitus      Past Surgical History:   Procedure Laterality Date     SECTION      x 3    ESOPHAGOGASTRODUODENOSCOPY N/A 10/18/2019    Procedure: ESOPHAGOGASTRODUODENOSCOPY (EGD);  Surgeon: Gianluca Mendez MD;  Location: [facility];  Service: Endoscopy;  Laterality: N/A;     Family History   Problem Relation Age of Onset    Diabetes Mother     Diabetes Father      Social History     Tobacco Use    Smoking status: Never Smoker    Smokeless tobacco: Never Used   Substance Use Topics    Alcohol use: No    Drug use: No       Review of patient's allergies indicates:   Allergen Reactions    Penicillins Hives       Outpatient meds:  No current facility-administered medications on file prior to encounter.     Current Outpatient Medications on File Prior to Encounter   Medication Sig Dispense Refill    acetaminophen (TYLENOL) 325 MG tablet Take 2 tablets (650 mg total) by mouth every 6 (six) hours as needed for Pain or Temperature greater than (100.3). 30 tablet 0    blood-glucose meter kit Use as instructed 1 each 0    cephALEXin (KEFLEX) 500 MG capsule Take 500 mg by mouth 4 (four) times daily.      doxycycline (VIBRAMYCIN) 100 MG Cap Take 100 mg by mouth 2 (two) times daily.      ibuprofen (ADVIL,MOTRIN) 600 MG tablet Take 1 tablet (600 mg total) by mouth every 6 (six) hours as needed for Pain. 20 tablet 0    insulin (BASAGLAR KWIKPEN U-100 INSULIN) glargine 100 units/mL (3mL) SubQ pen Inject 28 Units into the skin every evening. 25.2 mL 3    insulin glulisine U-100 (APIDRA U-100 INSULIN) 100 unit/mL injection Inject 8 Units into the skin 3 (three) times daily before meals. 21.6 mL 3    metoclopramide  HCl (REGLAN) 10 MG tablet Take 1 tablet (10 mg total) by mouth every 6 (six) hours as needed (headache, nausea or vomiting). 30 tablet 0    ondansetron (ZOFRAN) 4 MG tablet Take 1 tablet (4 mg total) by mouth every 6 (six) hours as needed for Nausea. 30 tablet 0    ondansetron (ZOFRAN-ODT) 4 MG TbDL Take 1 tablet (4 mg total) by mouth every 6 (six) hours as needed (nausea or vomiting). 12 tablet 0    oxyCODONE (ROXICODONE) 5 MG immediate release tablet Take 1 tablet (5 mg total) by mouth every 6 (six) hours as needed for Pain. 20 tablet 0    pantoprazole (PROTONIX) 40 MG tablet Take 1 tablet (40 mg total) by mouth once daily. 30 tablet 3    polyethylene glycol (GLYCOLAX) 17 gram PwPk Take 17 g by mouth once daily. 30 each 1    promethazine (PHENERGAN) 25 MG suppository Place 1 suppository (25 mg total) rectally every 6 (six) hours as needed for Nausea (For severe nausea and vomiting not improved with oral medications). 10 suppository 0    senna-docusate 8.6-50 mg (SENNA WITH DOCUSATE SODIUM) 8.6-50 mg per tablet Take 1 tablet by mouth daily as needed for Constipation. 30 tablet 1       Scheduled meds:   FLUoxetine  20 mg Oral Daily    furosemide (LASIX) injection  60 mg Intravenous Q12H    heparin (porcine)  5,000 Units Subcutaneous Q8H    hydrALAZINE  100 mg Oral Q8H    insulin detemir U-100  10 Units Subcutaneous Daily    isosorbide mononitrate  60 mg Oral Daily    levetiracetam IV  500 mg Intravenous Q12H    pantoprazole  40 mg Intravenous Daily       Infusions:      PRN meds:  acetaminophen, albuterol-ipratropium, aluminum-magnesium hydroxide-simethicone, clonazePAM, dextrose 10%, dextrose 10%, diphenhydrAMINE, glucagon (human recombinant), glucose, glucose, hydrALAZINE, insulin aspart U-100, magnesium oxide, magnesium oxide, melatonin, naloxone, ondansetron, potassium bicarbonate, potassium bicarbonate, potassium bicarbonate, potassium, sodium phosphates, potassium, sodium phosphates, potassium,  sodium phosphates, simethicone, sodium chloride 0.9%    Review of Systems:    OBJECTIVE:     Vital Signs and IO (Last 24H):  Temp:  [96.8 °F (36 °C)-99.2 °F (37.3 °C)]   Pulse:  [100-110]   Resp:  [16-18]   BP: (128-178)/(76-94)   SpO2:  [94 %-98 %]   I/O last 3 completed shifts:  In: 942.7 [P.O.:920; I.V.:22.7]  Out: 3095 [Urine:3095]    Wt Readings from Last 5 Encounters:   06/06/22 76.7 kg (169 lb 1.5 oz)   05/06/22 70.3 kg (155 lb)   04/11/22 65.8 kg (145 lb)   12/20/21 59.9 kg (132 lb)   05/04/21 65.6 kg (144 lb 11.7 oz)     Physical Exam:  Physical Exam  Constitutional:       Appearance: She is well-developed. She is not diaphoretic.   HENT:      Head: Normocephalic and atraumatic.   Eyes:      General: No scleral icterus.     Pupils: Pupils are equal, round, and reactive to light.   Cardiovascular:      Rate and Rhythm: Normal rate and regular rhythm.   Pulmonary:      Effort: Pulmonary effort is normal. No respiratory distress.      Breath sounds: No stridor.   Abdominal:      General: There is no distension.      Palpations: Abdomen is soft.   Musculoskeletal:         General: Swelling present. No deformity. Normal range of motion.      Cervical back: Neck supple.   Skin:     General: Skin is warm and dry.      Findings: No erythema or rash.   Neurological:      Mental Status: She is alert and oriented to person, place, and time.      Cranial Nerves: No cranial nerve deficit.   Psychiatric:         Behavior: Behavior normal.         Body mass index is 30.93 kg/m².    Laboratory:  Recent Labs   Lab 06/03/22  0344 06/04/22  0331 06/05/22  0455   * 134* 137   K 3.8 4.5 4.5    103 105   CO2 19* 19* 21*   BUN 50* 51* 54*   CREATININE 4.7* 4.5* 4.3*   ESTGFRAFRICA 13* 14* 15*   EGFRNONAA 11* 12* 13*   * 110 128*       Recent Labs   Lab 06/03/22  0344 06/04/22  0331 06/05/22  0455   CALCIUM 7.8* 8.2* 8.3*   ALBUMIN 1.8* 1.8* 1.7*   PHOS 4.4 4.9* 4.8*   MG 2.0 2.1 2.1             Recent Labs    Lab 06/03/22  1548 06/03/22  1837 06/03/22  2131 06/04/22  0849 06/04/22  1234 06/04/22  1826 06/04/22  2042 06/05/22  1152 06/05/22  1808 06/05/22  2229   POCTGLUCOSE 59* 108 115* 142* 151* 134* 130* 159* 80 118*       Recent Labs   Lab 01/26/22  0240 03/06/22  1135 05/15/22  1954   Hemoglobin A1C 6.8 H 5.3 5.8 H       Recent Labs   Lab 06/03/22  0344 06/04/22  0331 06/05/22  0455   WBC 9.93 9.36 9.88   HGB 9.6* 10.2* 9.7*   HCT 26.0* 27.7* 28.1*    190 166   MCV 80* 79* 81*   MCHC 36.9* 36.8* 34.5   MONO 12.0  1.2* 8.1  0.8 8.1  0.8       Recent Labs   Lab 06/03/22  0344 06/04/22  0331 06/05/22  0455   BILITOT 0.6 0.7 0.6   PROT 4.9* 5.4* 4.8*   ALBUMIN 1.8* 1.8* 1.7*   ALKPHOS 62 65 62   ALT 17 14 11   AST 19 24 11       Recent Labs   Lab 09/10/20  0355 09/10/20  0355 12/13/20  1702 04/23/21 2006 04/24/21  1259 05/28/22 2036   Color, UA Yellow  --  Red A Yellow Yellow Yellow   Appearance, UA Clear  --  Cloudy A Clear Clear Clear   pH, UA 8.0  --  6.0 6.0 6.0 7.0   Specific Gravity, UA 1.010  --  1.010 1.020 1.010 1.020   Protein, UA Negative  --  2+ A 3+ A 3+ A 3+ A   Glucose, UA 3+ A  --  3+ A 3+ A 3+ A 2+ A   Ketones, UA Trace A  --  1+ A 1+ A Trace A Negative   Urobilinogen, UA 1.0  --  1.0  --   --  Negative   Bilirubin (UA) Negative  --  Negative Negative Negative Negative   Occult Blood UA 1+ A  --  3+ A 2+ A 1+ A 2+ A   Nitrite, UA Negative  --  Positive A Negative Negative Negative   RBC, UA 2  --  >100 H 54 H 2 5 H   WBC, UA 12 H  --  2 44 H 5 1   Bacteria Many A  --  Few A Occasional Rare None   Hyaline Casts, UA  --    < > 0 0 0 0    < > = values in this interval not displayed.       Recent Labs   Lab 12/13/20  1042 04/23/21  1952 04/23/21 2006 04/11/22 2043 04/11/22  2345 04/12/22  0211   POC PH 7.457 H 7.458 H  --  7.365  --   --    POC PCO2 32.6 L 32.9 L  --  39.5  --   --    POC HCO3 23.0 L 23.3 L  --  22.6 L  --   --    POC PO2 26 LL 24 LL  --  25 L  --   --    POC SATURATED O2 53  L 47 L  --  42 L  --   --    POC BE -1 -1  --  -3  --   --    Sample VENOUS VENOUS   < > VENOUS VENOUS VENOUS    < > = values in this interval not displayed.       Microbiology Results (last 7 days)     Procedure Component Value Units Date/Time    Clostridium difficile EIA [[id number]]     Order Status: Canceled Specimen: Stool         ASSESSMENT/PLAN:     CKD stage 4  Edema due to hypoalbuminemia  Hyponatremia  Acidosis  Uncontrolled DM  No NSAIDs, ACEI/ARB, IV contrast or other nephrotoxins.  Keep MAP > 60, SBP > 100.  Dose meds for GFR < 30 ml/min.  Renal diet - low K, low phos. Limit free water intake.  Hold oral BP meds, titrate diuretic as tolerated, keep washington with close IO. Tolerate -160 to help diuresis, keep nicardipine drip.  Control DM.    Anemia of CKD  Hgb and HCT are acceptable. Monitor.  Will provide SHELLEY and/or IV iron PRN.    HTN  BP seem controlled.   Tolerate asymptomatic HTN up to -160.  Stop all oral meds, keep Nicardipine, increase diuretic as tolerated to deal with edema, keep washington.    Thank you for allowing us to participate in the care of your patient!   We will follow the patient and provide recommendations as needed.    Patient care time was spent personally by me on the following activities:   · Obtaining a history.  · Examination of patient.  · Providing medical care at the patients bedside.  · Developing a treatment plan with patient or surrogate and bedside caregivers.  · Ordering and reviewing laboratory studies, radiographic studies, pulse oximetry.  · Ordering and performing treatments and interventions.  · Evaluation of patient's response to treatment.  · Discussions with consultants while on the unit and immediately available to the patient.  · Re-evaluation of the patient's condition.  · Documentation in the medical record.     Geeta Kaur NP      [facility]  [address] [place] [address]  JEANMARIE Connolly [id number] [phone] - tel  [phone] -  fax    6/6/2022

## 2022-06-06 NOTE — SUBJECTIVE & OBJECTIVE
Interval History: Patient seen and examined. No new complaints. Cr 4 today    Review of Systems   Constitutional:  Positive for unexpected weight change.   Genitourinary:  Negative for decreased urine volume.   Skin:  Positive for pallor.   Allergic/Immunologic: Positive for immunocompromised state.   Neurological:  Positive for weakness. Negative for seizures and headaches.   All other systems reviewed and are negative.  Objective:     Vital Signs (Most Recent):  Temp: 96.7 °F (35.9 °C) (06/06/22 1143)  Pulse: 106 (06/06/22 1143)  Resp: 16 (06/06/22 1143)  BP: 139/74 (06/06/22 1143)  SpO2: 95 % (06/06/22 1143)   Vital Signs (24h Range):  Temp:  [96.7 °F (35.9 °C)-99.2 °F (37.3 °C)] 96.7 °F (35.9 °C)  Pulse:  [100-110] 106  Resp:  [16-18] 16  SpO2:  [94 %-98 %] 95 %  BP: (134-178)/(74-94) 139/74     Weight: 76.7 kg (169 lb 1.5 oz)  Body mass index is 30.93 kg/m².    Intake/Output Summary (Last 24 hours) at 6/6/2022 1202  Last data filed at 6/6/2022 0536  Gross per 24 hour   Intake 480 ml   Output 1800 ml   Net -1320 ml        Physical Exam  Vitals and nursing note reviewed.   Constitutional:       General: She is not in acute distress.     Appearance: She is well-developed. She is ill-appearing. She is not diaphoretic.      Comments: Anasarca.   HENT:      Head: Normocephalic and atraumatic.      Right Ear: External ear normal.      Left Ear: External ear normal.      Nose: Nose normal.   Eyes:      General: No scleral icterus.     Conjunctiva/sclera: Conjunctivae normal.   Neck:      Vascular: No JVD.   Cardiovascular:      Rate and Rhythm: Normal rate and regular rhythm.      Pulses: Normal pulses.      Heart sounds: Normal heart sounds. No murmur heard.    No friction rub. No gallop.   Pulmonary:      Effort: Pulmonary effort is normal. No respiratory distress.      Breath sounds: Normal breath sounds. No wheezing or rales.   Abdominal:      General: Bowel sounds are normal. There is no distension.       Palpations: Abdomen is soft.      Tenderness: There is no abdominal tenderness. There is no guarding or rebound.   Genitourinary:     Comments: Pelayo.  Musculoskeletal:         General: No tenderness. Normal range of motion.      Cervical back: Neck supple.      Right lower leg: No edema.      Left lower leg: No edema.   Skin:     General: Skin is warm and dry.      Coloration: Skin is not pale.      Findings: No erythema or rash.   Neurological:      Mental Status: She is oriented to person, place, and time.       Significant Labs: All pertinent labs within the past 24 hours have been reviewed.    Significant Imaging: I have reviewed all pertinent imaging results/findings within the past 24 hours.

## 2022-06-06 NOTE — PLAN OF CARE
Problem: Physical Therapy  Goal: Physical Therapy Goal  Description: Goals to be met by: 2022     Patient will increase functional independence with mobility by performin. Supine to sit with MInimal Assistance  2. Sit to stand transfer with Minimal Assistance  3. Bed to chair transfer with Minimal Assistance using Rolling Walker  4. Gait  x 250 feet with Minimal Assistance using Rolling Walker.   5. Lower extremity exercise program x20 reps  Outcome: Ongoing, Progressing

## 2022-06-06 NOTE — PROGRESS NOTES
[facility] Hospital Medicine  Progress Note    Patient Name: Tabby Howard  MRN: [id number]  Patient Class: IP- Inpatient   Admission Date: 5/28/2022  Length of Stay: 9 days  Attending Physician: Linda Cueto MD  Primary Care Provider: Primary Doctor No        Subjective:     Principal Problem:PRES (posterior reversible encephalopathy syndrome)        HPI:  Tabby Howard 32-year-old female presents emergency room for evaluation of seizure and hypoglycemia.  Patient has a history of diabetes and gastroparesis.  She reports that she has been taking her insulin but not able to hold any food down due to nausea and vomiting.  She was treated several times in the ER for hypoglycemia using dextrose 50%.  Just before my exam patient was reported to have clonic tonic seizure activity which lasted approximately 1 minute; however her glucose at the time was approximately 120. Previous medical history includes anemia, CKD, type 2 diabetes, gallstones, CKD, chronic diastolic heart failure, and gastroparesis.  ER workup:  CBC with thrombocytopenia of 125.  Original glucose on CMP was 48. BUN 46 and creatinine of 3.9 (baseline).  Urinalysis with 5 red blood cells and rare yeast.  CT the head was negative.  Patient was given Ativan status post seizure as well as given a Keppra loading dose.  Patient be admitted to Hospital Medicine for observation management.  Patient be placed in ICU given continue seizure and hypoglycemia.  Will consult Neurology in a.m..      Overview/Hospital Course:  Tabby Howard is a 32 year old female with a past medical history of IDDM, CKD IV, gastroparesis, and HFpEF who presented with seizure. She was discovered to be hypoglycemic and was started on a D5 infusion (since discontinued). Neurology was consulted. Her BP was observed to be elevated as well and she was started on a Cardene infusion. EEG of the brain did not show epileptiform activity, yet MRI of the brain showed  concern for PRES. Keppra was started per Neurology 5/30. Her mental status continued to improve. Nephrology was consulted 6/1 given the patient's progressive CKD. Savannah was able to be weaned as she started on HCTZ, hydralazine and Imdur.  Nephrology has also scheduled IV Lasix in order to increase her urine output which has mildly improved her kidney function. She was able to be transferred out of the ICU 6/5/2022. She is working with PT/OT who recommends home health.      Interval History: Patient seen and examined. No new complaints. Cr 4 today    Review of Systems   Constitutional:  Positive for unexpected weight change.   Genitourinary:  Negative for decreased urine volume.   Skin:  Positive for pallor.   Allergic/Immunologic: Positive for immunocompromised state.   Neurological:  Positive for weakness. Negative for seizures and headaches.   All other systems reviewed and are negative.  Objective:     Vital Signs (Most Recent):  Temp: 96.7 °F (35.9 °C) (06/06/22 1143)  Pulse: 106 (06/06/22 1143)  Resp: 16 (06/06/22 1143)  BP: 139/74 (06/06/22 1143)  SpO2: 95 % (06/06/22 1143)   Vital Signs (24h Range):  Temp:  [96.7 °F (35.9 °C)-99.2 °F (37.3 °C)] 96.7 °F (35.9 °C)  Pulse:  [100-110] 106  Resp:  [16-18] 16  SpO2:  [94 %-98 %] 95 %  BP: (134-178)/(74-94) 139/74     Weight: 76.7 kg (169 lb 1.5 oz)  Body mass index is 30.93 kg/m².    Intake/Output Summary (Last 24 hours) at 6/6/2022 1202  Last data filed at 6/6/2022 0536  Gross per 24 hour   Intake 480 ml   Output 1800 ml   Net -1320 ml        Physical Exam  Vitals and nursing note reviewed.   Constitutional:       General: She is not in acute distress.     Appearance: She is well-developed. She is ill-appearing. She is not diaphoretic.      Comments: Anasarca.   HENT:      Head: Normocephalic and atraumatic.      Right Ear: External ear normal.      Left Ear: External ear normal.      Nose: Nose normal.   Eyes:      General: No scleral icterus.      Conjunctiva/sclera: Conjunctivae normal.   Neck:      Vascular: No JVD.   Cardiovascular:      Rate and Rhythm: Normal rate and regular rhythm.      Pulses: Normal pulses.      Heart sounds: Normal heart sounds. No murmur heard.    No friction rub. No gallop.   Pulmonary:      Effort: Pulmonary effort is normal. No respiratory distress.      Breath sounds: Normal breath sounds. No wheezing or rales.   Abdominal:      General: Bowel sounds are normal. There is no distension.      Palpations: Abdomen is soft.      Tenderness: There is no abdominal tenderness. There is no guarding or rebound.   Genitourinary:     Comments: Pelayo.  Musculoskeletal:         General: No tenderness. Normal range of motion.      Cervical back: Neck supple.      Right lower leg: No edema.      Left lower leg: No edema.   Skin:     General: Skin is warm and dry.      Coloration: Skin is not pale.      Findings: No erythema or rash.   Neurological:      Mental Status: She is oriented to person, place, and time.       Significant Labs: All pertinent labs within the past 24 hours have been reviewed.    Significant Imaging: I have reviewed all pertinent imaging results/findings within the past 24 hours.      Assessment/Plan:      * PRES (posterior reversible encephalopathy syndrome)  -Seizure precautions  -PRN IV Ativan  -BP control with PO medications; continue to titrate  -Neurology following; will need repeat MRI in three months  -Keppra  -Neurologic checks    Seizure  Induced by hypoglycemia and/or PRES.  -Neurology consulted  -Neuro checks Q4H  -Seizure, fall and aspiration precautions  -PRN IV Ativan  -BP control with HCTZ, hydralazine/nitrate  -Keppra        Gastroparesis  Chronic.  -Renal and diabetic diet  -Hold Reglan as it lowers seizure threshold      CKD (chronic kidney disease), stage IV  -Nephrology following  -Renally dose all medications  -Avoid nephrotoxic agents  -Monitor UOP and electrolytes  -Trend creatinine  -Continue HCTZ  and IV Lasix        Anemia of chronic kidney failure  -Trend with CBC        Type II diabetes mellitus  -SSI  -Detemir 10  -Diabetic renal diet  -AC HS glucose checks  -Hypoglycemic precautions      VTE Risk Mitigation (From admission, onward)         Ordered     heparin (porcine) injection 5,000 Units  Every 8 hours         06/05/22 0816     IP VTE LOW RISK PATIENT  Once         05/29/22 0004     Place sequential compression device  Until discontinued         05/29/22 0004     Place JOCELYNE hose  Until discontinued         05/29/22 0004                Discharge Planning   TATIANA: 6/3/2022     Code Status: Full Code   Is the patient medically ready for discharge?:     Reason for patient still in hospital (select all that apply): Patient trending condition and Treatment  Discharge Plan A: Home with family                  Linda Cueto MD  Department of Hospital Medicine   [facility]   Consent: The patient's consent was obtained including but not limited to risks of crusting, scabbing, blistering, scarring, darker or lighter pigmentary change, recurrence, incomplete removal and infection. Show Aperture Variable?: Yes Duration Of Freeze Thaw-Cycle (Seconds): 0 Render Post-Care Instructions In Note?: no Post-Care Instructions: I reviewed with the patient in detail post-care instructions. Patient is to wear sunprotection, and avoid picking at any of the treated lesions. Pt may apply Vaseline to crusted or scabbing areas. Detail Level: Detailed

## 2022-06-07 VITALS
WEIGHT: 163.56 LBS | HEIGHT: 62 IN | SYSTOLIC BLOOD PRESSURE: 129 MMHG | BODY MASS INDEX: 30.1 KG/M2 | RESPIRATION RATE: 16 BRPM | DIASTOLIC BLOOD PRESSURE: 81 MMHG | OXYGEN SATURATION: 97 % | TEMPERATURE: 98 F | HEART RATE: 110 BPM

## 2022-06-07 LAB
ALBUMIN SERPL BCP-MCNC: 1.7 G/DL (ref 3.5–5.2)
ALP SERPL-CCNC: 56 U/L (ref 55–135)
ALT SERPL W/O P-5'-P-CCNC: 8 U/L (ref 10–44)
ANION GAP SERPL CALC-SCNC: 10 MMOL/L (ref 8–16)
AST SERPL-CCNC: 12 U/L (ref 10–40)
BASOPHILS # BLD AUTO: 0.04 K/UL (ref 0–0.2)
BASOPHILS NFR BLD: 0.5 % (ref 0–1.9)
BILIRUB SERPL-MCNC: 0.5 MG/DL (ref 0.1–1)
BUN SERPL-MCNC: 49 MG/DL (ref 6–20)
CALCIUM SERPL-MCNC: 8.1 MG/DL (ref 8.7–10.5)
CHLORIDE SERPL-SCNC: 105 MMOL/L (ref 95–110)
CO2 SERPL-SCNC: 21 MMOL/L (ref 23–29)
CREAT SERPL-MCNC: 3.6 MG/DL (ref 0.5–1.4)
DIFFERENTIAL METHOD: ABNORMAL
EOSINOPHIL # BLD AUTO: 0.2 K/UL (ref 0–0.5)
EOSINOPHIL NFR BLD: 1.9 % (ref 0–8)
ERYTHROCYTE [DISTWIDTH] IN BLOOD BY AUTOMATED COUNT: 12.7 % (ref 11.5–14.5)
EST. GFR  (AFRICAN AMERICAN): 18 ML/MIN/1.73 M^2
EST. GFR  (NON AFRICAN AMERICAN): 16 ML/MIN/1.73 M^2
GLUCOSE SERPL-MCNC: 131 MG/DL (ref 70–110)
HCT VFR BLD AUTO: 24.9 % (ref 37–48.5)
HGB BLD-MCNC: 9 G/DL (ref 12–16)
IMM GRANULOCYTES # BLD AUTO: 0.06 K/UL (ref 0–0.04)
IMM GRANULOCYTES NFR BLD AUTO: 0.8 % (ref 0–0.5)
LYMPHOCYTES # BLD AUTO: 1.3 K/UL (ref 1–4.8)
LYMPHOCYTES NFR BLD: 16.2 % (ref 18–48)
MAGNESIUM SERPL-MCNC: 1.9 MG/DL (ref 1.6–2.6)
MCH RBC QN AUTO: 28.8 PG (ref 27–31)
MCHC RBC AUTO-ENTMCNC: 36.1 G/DL (ref 32–36)
MCV RBC AUTO: 80 FL (ref 82–98)
MONOCYTES # BLD AUTO: 0.7 K/UL (ref 0.3–1)
MONOCYTES NFR BLD: 8.8 % (ref 4–15)
NEUTROPHILS # BLD AUTO: 5.7 K/UL (ref 1.8–7.7)
NEUTROPHILS NFR BLD: 71.8 % (ref 38–73)
NRBC BLD-RTO: 0 /100 WBC
PHOSPHATE SERPL-MCNC: 3.7 MG/DL (ref 2.7–4.5)
PLATELET # BLD AUTO: 182 K/UL (ref 150–450)
PMV BLD AUTO: 9.7 FL (ref 9.2–12.9)
POCT GLUCOSE: 102 MG/DL (ref 70–110)
POCT GLUCOSE: 128 MG/DL (ref 70–110)
POCT GLUCOSE: 139 MG/DL (ref 70–110)
POCT GLUCOSE: 62 MG/DL (ref 70–110)
POCT GLUCOSE: 89 MG/DL (ref 70–110)
POTASSIUM SERPL-SCNC: 4 MMOL/L (ref 3.5–5.1)
PROT SERPL-MCNC: 4.6 G/DL (ref 6–8.4)
RBC # BLD AUTO: 3.12 M/UL (ref 4–5.4)
SODIUM SERPL-SCNC: 136 MMOL/L (ref 136–145)
WBC # BLD AUTO: 7.98 K/UL (ref 3.9–12.7)

## 2022-06-07 PROCEDURE — 99900035 HC TECH TIME PER 15 MIN (STAT)

## 2022-06-07 PROCEDURE — 25000003 PHARM REV CODE 250: Performed by: STUDENT IN AN ORGANIZED HEALTH CARE EDUCATION/TRAINING PROGRAM

## 2022-06-07 PROCEDURE — 63600175 PHARM REV CODE 636 W HCPCS: Performed by: STUDENT IN AN ORGANIZED HEALTH CARE EDUCATION/TRAINING PROGRAM

## 2022-06-07 PROCEDURE — C9399 UNCLASSIFIED DRUGS OR BIOLOG: HCPCS | Performed by: STUDENT IN AN ORGANIZED HEALTH CARE EDUCATION/TRAINING PROGRAM

## 2022-06-07 PROCEDURE — 83735 ASSAY OF MAGNESIUM: CPT | Performed by: STUDENT IN AN ORGANIZED HEALTH CARE EDUCATION/TRAINING PROGRAM

## 2022-06-07 PROCEDURE — 95819 EEG AWAKE AND ASLEEP: CPT

## 2022-06-07 PROCEDURE — 84100 ASSAY OF PHOSPHORUS: CPT | Performed by: STUDENT IN AN ORGANIZED HEALTH CARE EDUCATION/TRAINING PROGRAM

## 2022-06-07 PROCEDURE — 94761 N-INVAS EAR/PLS OXIMETRY MLT: CPT

## 2022-06-07 PROCEDURE — 36415 COLL VENOUS BLD VENIPUNCTURE: CPT | Performed by: INTERNAL MEDICINE

## 2022-06-07 PROCEDURE — 86038 ANTINUCLEAR ANTIBODIES: CPT | Performed by: INTERNAL MEDICINE

## 2022-06-07 PROCEDURE — C9113 INJ PANTOPRAZOLE SODIUM, VIA: HCPCS | Performed by: STUDENT IN AN ORGANIZED HEALTH CARE EDUCATION/TRAINING PROGRAM

## 2022-06-07 PROCEDURE — 80053 COMPREHEN METABOLIC PANEL: CPT | Performed by: STUDENT IN AN ORGANIZED HEALTH CARE EDUCATION/TRAINING PROGRAM

## 2022-06-07 PROCEDURE — 85025 COMPLETE CBC W/AUTO DIFF WBC: CPT | Performed by: STUDENT IN AN ORGANIZED HEALTH CARE EDUCATION/TRAINING PROGRAM

## 2022-06-07 RX ORDER — ISOSORBIDE MONONITRATE 60 MG/1
60 TABLET, EXTENDED RELEASE ORAL DAILY
Qty: 30 TABLET | Refills: 1 | Status: ON HOLD | OUTPATIENT
Start: 2022-06-08 | End: 2022-07-15 | Stop reason: SDUPTHER

## 2022-06-07 RX ORDER — LEVETIRACETAM 500 MG/1
500 TABLET ORAL 2 TIMES DAILY
Qty: 60 TABLET | Refills: 1 | Status: ON HOLD | OUTPATIENT
Start: 2022-06-07 | End: 2022-11-15 | Stop reason: SDUPTHER

## 2022-06-07 RX ORDER — FUROSEMIDE 40 MG/1
40 TABLET ORAL 2 TIMES DAILY
Qty: 60 TABLET | Refills: 0 | OUTPATIENT
Start: 2022-06-07 | End: 2022-06-11

## 2022-06-07 RX ORDER — FLUOXETINE HYDROCHLORIDE 20 MG/1
20 CAPSULE ORAL DAILY
Qty: 30 CAPSULE | Refills: 1 | Status: ON HOLD | OUTPATIENT
Start: 2022-06-08 | End: 2022-11-15 | Stop reason: SDUPTHER

## 2022-06-07 RX ORDER — INSULIN GLARGINE 100 [IU]/ML
10 INJECTION, SOLUTION SUBCUTANEOUS NIGHTLY
Qty: 9 ML | Refills: 3 | Status: SHIPPED | OUTPATIENT
Start: 2022-06-07 | End: 2022-07-07 | Stop reason: DRUGHIGH

## 2022-06-07 RX ADMIN — INSULIN DETEMIR 10 UNITS: 100 INJECTION, SOLUTION SUBCUTANEOUS at 09:06

## 2022-06-07 RX ADMIN — HEPARIN SODIUM 5000 UNITS: 5000 INJECTION INTRAVENOUS; SUBCUTANEOUS at 05:06

## 2022-06-07 RX ADMIN — PANTOPRAZOLE SODIUM 40 MG: 40 INJECTION, POWDER, LYOPHILIZED, FOR SOLUTION INTRAVENOUS at 09:06

## 2022-06-07 RX ADMIN — CLONAZEPAM 0.5 MG: 0.5 TABLET ORAL at 04:06

## 2022-06-07 RX ADMIN — HYDRALAZINE HYDROCHLORIDE 100 MG: 25 TABLET, FILM COATED ORAL at 01:06

## 2022-06-07 RX ADMIN — FUROSEMIDE 60 MG: 10 INJECTION, SOLUTION INTRAVENOUS at 09:06

## 2022-06-07 RX ADMIN — HYDRALAZINE HYDROCHLORIDE 100 MG: 25 TABLET, FILM COATED ORAL at 05:06

## 2022-06-07 RX ADMIN — FLUOXETINE 20 MG: 20 CAPSULE ORAL at 09:06

## 2022-06-07 RX ADMIN — ISOSORBIDE MONONITRATE 60 MG: 60 TABLET, EXTENDED RELEASE ORAL at 09:06

## 2022-06-07 RX ADMIN — LEVETIRACETAM 500 MG: 100 INJECTION INTRAVENOUS at 09:06

## 2022-06-07 RX ADMIN — HEPARIN SODIUM 5000 UNITS: 5000 INJECTION INTRAVENOUS; SUBCUTANEOUS at 01:06

## 2022-06-07 NOTE — PLAN OF CARE
Hospital follow up scheduled with PCP and neuro. AVS updated.       Unable to schedule apt with Dr. Clark due to their office protocol. Pt will have to call after DC to arrange. Instructions on AVS

## 2022-06-07 NOTE — PROGRESS NOTES
"Nephrology Progress Note        Patient Name: Tabby Howard  MRN: [id number]    Patient Class: IP- Inpatient   Admission Date: 5/28/2022  Length of Stay: 10 days  Date of Service: 6/7/2022    Attending Physician: Linda Cueto MD  Primary Care Provider: Primary Doctor No    Reason for Consult: ckd4/acidosis/hyponatremia/anemia/htn/edema    SUBJECTIVE:     HPI: 32F with anemia, CKD, type 2 diabetes, gallstones, CKD 4, chronic diastolic heart failure, gastroparesis admitted on 5/29 for seizure and hypoglycemia. She has been taking her insulin but not able to hold any food down due to nausea and vomiting. She was treated several times in the ER for hypoglycemia, but had a witnessed clonic-tonic seizure for about 1 minute. Received Ativan and Keppra. Lisnoprill was topped today after 2 doses, probably over concern for worsening sCr. Amodipine and Clonidine was added in addition to nicardipine gtt. UO is low as documented and she is net positive since admission. Renal is consulted for co-management.    6/2 VSS, no new complains. UO still low, BP is up - will increase diuretics to 60 mg BID.  6/3 VSS, keep diuretic BID and titrate Nicardipine to MAP > 60, let BP ride a little higher to help diuresis. sCr is up but stable. DO not add oral BP meds yet as I am attempting diuresis and too much BP control can cause KANWAL.  6/4  UOP 1.4L.  Scr trended down a little.  Still requiring cardene gtt.  Edemetous, getting IV lasix bid and HCTZ.  Looking at phone, no complaints.  6/5  Renal function w/small improvement.  Pressures looking better so far today.  Na+ now in range.  1.9L UOP.  Off cardene gtt, on hydralazine per primary team.  Tearful today, had "a bad night".  6/6  2.1L UOP recorded.  No chemistry results yet this am.  Pressures still a little high, likely fluid related-- diuresing.  6/7  Pressures improving overall w/diuresis, still has a few spikes.  UOP 3.4L.  Renal function improving.  Pt sleeping, d/w bedside nurse.  " Pt has periods of anxiety/crying d/t missing her family.    Past Medical History:   Diagnosis Date    CKD (chronic kidney disease), stage IV 2022    Diabetes mellitus due to underlying condition with unspecified complications 2022    Gastroparesis 2022    Heart failure with preserved ejection fraction 2022    EF 55% on 3/22    History of gastroesophageal reflux (GERD)     History of supraventricular tachycardia     Sickle cell trait 2022    Type 2 diabetes mellitus      Past Surgical History:   Procedure Laterality Date     SECTION      x 3    ESOPHAGOGASTRODUODENOSCOPY N/A 10/18/2019    Procedure: ESOPHAGOGASTRODUODENOSCOPY (EGD);  Surgeon: Gianluca Mendez MD;  Location: [facility];  Service: Endoscopy;  Laterality: N/A;     Family History   Problem Relation Age of Onset    Diabetes Mother     Diabetes Father      Social History     Tobacco Use    Smoking status: Never Smoker    Smokeless tobacco: Never Used   Substance Use Topics    Alcohol use: No    Drug use: No       Review of patient's allergies indicates:   Allergen Reactions    Penicillins Hives       Outpatient meds:  No current facility-administered medications on file prior to encounter.     Current Outpatient Medications on File Prior to Encounter   Medication Sig Dispense Refill    acetaminophen (TYLENOL) 325 MG tablet Take 2 tablets (650 mg total) by mouth every 6 (six) hours as needed for Pain or Temperature greater than (100.3). 30 tablet 0    blood-glucose meter kit Use as instructed 1 each 0    cephALEXin (KEFLEX) 500 MG capsule Take 500 mg by mouth 4 (four) times daily.      doxycycline (VIBRAMYCIN) 100 MG Cap Take 100 mg by mouth 2 (two) times daily.      ibuprofen (ADVIL,MOTRIN) 600 MG tablet Take 1 tablet (600 mg total) by mouth every 6 (six) hours as needed for Pain. 20 tablet 0    insulin (BASAGLAR KWIKPEN U-100 INSULIN) glargine 100 units/mL (3mL) SubQ pen Inject 28 Units into the skin  every evening. 25.2 mL 3    insulin glulisine U-100 (APIDRA U-100 INSULIN) 100 unit/mL injection Inject 8 Units into the skin 3 (three) times daily before meals. 21.6 mL 3    metoclopramide HCl (REGLAN) 10 MG tablet Take 1 tablet (10 mg total) by mouth every 6 (six) hours as needed (headache, nausea or vomiting). 30 tablet 0    ondansetron (ZOFRAN) 4 MG tablet Take 1 tablet (4 mg total) by mouth every 6 (six) hours as needed for Nausea. 30 tablet 0    ondansetron (ZOFRAN-ODT) 4 MG TbDL Take 1 tablet (4 mg total) by mouth every 6 (six) hours as needed (nausea or vomiting). 12 tablet 0    oxyCODONE (ROXICODONE) 5 MG immediate release tablet Take 1 tablet (5 mg total) by mouth every 6 (six) hours as needed for Pain. 20 tablet 0    pantoprazole (PROTONIX) 40 MG tablet Take 1 tablet (40 mg total) by mouth once daily. 30 tablet 3    polyethylene glycol (GLYCOLAX) 17 gram PwPk Take 17 g by mouth once daily. 30 each 1    promethazine (PHENERGAN) 25 MG suppository Place 1 suppository (25 mg total) rectally every 6 (six) hours as needed for Nausea (For severe nausea and vomiting not improved with oral medications). 10 suppository 0    senna-docusate 8.6-50 mg (SENNA WITH DOCUSATE SODIUM) 8.6-50 mg per tablet Take 1 tablet by mouth daily as needed for Constipation. 30 tablet 1       Scheduled meds:   FLUoxetine  20 mg Oral Daily    furosemide (LASIX) injection  60 mg Intravenous Q12H    heparin (porcine)  5,000 Units Subcutaneous Q8H    hydrALAZINE  100 mg Oral Q8H    insulin detemir U-100  10 Units Subcutaneous Daily    isosorbide mononitrate  60 mg Oral Daily    levetiracetam IV  500 mg Intravenous Q12H    pantoprazole  40 mg Intravenous Daily       Infusions:      PRN meds:  acetaminophen, albuterol-ipratropium, aluminum-magnesium hydroxide-simethicone, clonazePAM, dextrose 10%, dextrose 10%, diphenhydrAMINE, glucagon (human recombinant), glucose, glucose, hydrALAZINE, insulin aspart U-100, magnesium oxide,  magnesium oxide, melatonin, naloxone, ondansetron, potassium bicarbonate, potassium bicarbonate, potassium bicarbonate, potassium, sodium phosphates, potassium, sodium phosphates, potassium, sodium phosphates, simethicone, sodium chloride 0.9%    Review of Systems:    OBJECTIVE:     Vital Signs and IO (Last 24H):  Temp:  [96.7 °F (35.9 °C)-98.4 °F (36.9 °C)]   Pulse:  [104-118]   Resp:  [16-18]   BP: (127-171)/(74-96)   SpO2:  [92 %-96 %]   I/O last 3 completed shifts:  In: 840 [P.O.:840]  Out: 5235 [Urine:5235]    Wt Readings from Last 5 Encounters:   06/07/22 74.2 kg (163 lb 9.3 oz)   05/06/22 70.3 kg (155 lb)   04/11/22 65.8 kg (145 lb)   12/20/21 59.9 kg (132 lb)   05/04/21 65.6 kg (144 lb 11.7 oz)     Physical Exam:  Physical Exam  Constitutional:       Appearance: She is well-developed. She is not diaphoretic.   HENT:      Head: Normocephalic and atraumatic.   Eyes:      General: No scleral icterus.     Pupils: Pupils are equal, round, and reactive to light.   Cardiovascular:      Rate and Rhythm: Normal rate and regular rhythm.   Pulmonary:      Effort: Pulmonary effort is normal. No respiratory distress.      Breath sounds: No stridor.   Abdominal:      General: There is no distension.      Palpations: Abdomen is soft.   Musculoskeletal:         General: Swelling present. No deformity. Normal range of motion.      Cervical back: Neck supple.   Skin:     General: Skin is warm and dry.      Findings: No erythema or rash.   Neurological:      Mental Status: She is alert and oriented to person, place, and time.      Cranial Nerves: No cranial nerve deficit.   Psychiatric:         Behavior: Behavior normal.         Body mass index is 29.92 kg/m².    Laboratory:  Recent Labs   Lab 06/04/22  0331 06/05/22  0455 06/06/22  0839   * 137 136   K 4.5 4.5 4.0    105 104   CO2 19* 21* 22*   BUN 51* 54* 50*   CREATININE 4.5* 4.3* 4.0*   ESTGFRAFRICA 14* 15* 16*   EGFRNONAA 12* 13* 14*    128* 148*        Recent Labs   Lab 06/04/22  0331 06/05/22  0455 06/06/22  0839   CALCIUM 8.2* 8.3* 8.2*   ALBUMIN 1.8* 1.7* 1.7*   PHOS 4.9* 4.8* 4.2   MG 2.1 2.1 2.0             Recent Labs   Lab 06/04/22  0849 06/04/22  1234 06/04/22  1826 06/04/22  2042 06/05/22  1152 06/05/22  1808 06/05/22  2229 06/06/22  1300 06/06/22  2057 06/07/22  0739   POCTGLUCOSE 142* 151* 134* 130* 159* 80 118* 149* 161* 139*       Recent Labs   Lab 01/26/22  0240 03/06/22  1135 05/15/22  1954   Hemoglobin A1C 6.8 H 5.3 5.8 H       Recent Labs   Lab 06/05/22  0455 06/06/22  0838 06/07/22  0746   WBC 9.88 9.31 7.98   HGB 9.7* 8.9* 9.0*   HCT 28.1* 25.1* 24.9*    180 182   MCV 81* 81* 80*   MCHC 34.5 35.5 36.1*   MONO 8.1  0.8 8.2  0.8 8.8  0.7       Recent Labs   Lab 06/04/22  0331 06/05/22  0455 06/06/22  0839   BILITOT 0.7 0.6 0.6   PROT 5.4* 4.8* 4.7*   ALBUMIN 1.8* 1.7* 1.7*   ALKPHOS 65 62 59   ALT 14 11 11   AST 24 11 13       Recent Labs   Lab 09/10/20  0355 09/10/20  0355 12/13/20  1702 04/23/21 2006 04/24/21  1259 05/28/22 2036   Color, UA Yellow  --  Red A Yellow Yellow Yellow   Appearance, UA Clear  --  Cloudy A Clear Clear Clear   pH, UA 8.0  --  6.0 6.0 6.0 7.0   Specific Gravity, UA 1.010  --  1.010 1.020 1.010 1.020   Protein, UA Negative  --  2+ A 3+ A 3+ A 3+ A   Glucose, UA 3+ A  --  3+ A 3+ A 3+ A 2+ A   Ketones, UA Trace A  --  1+ A 1+ A Trace A Negative   Urobilinogen, UA 1.0  --  1.0  --   --  Negative   Bilirubin (UA) Negative  --  Negative Negative Negative Negative   Occult Blood UA 1+ A  --  3+ A 2+ A 1+ A 2+ A   Nitrite, UA Negative  --  Positive A Negative Negative Negative   RBC, UA 2  --  >100 H 54 H 2 5 H   WBC, UA 12 H  --  2 44 H 5 1   Bacteria Many A  --  Few A Occasional Rare None   Hyaline Casts, UA  --    < > 0 0 0 0    < > = values in this interval not displayed.       Recent Labs   Lab 12/13/20  1042 04/23/21  1952 04/23/21 2006 04/11/22  2043 04/11/22  2345 04/12/22  0211   POC PH 7.457 H 7.458  H  --  7.365  --   --    POC PCO2 32.6 L 32.9 L  --  39.5  --   --    POC HCO3 23.0 L 23.3 L  --  22.6 L  --   --    POC PO2 26 LL 24 LL  --  25 L  --   --    POC SATURATED O2 53 L 47 L  --  42 L  --   --    POC BE -1 -1  --  -3  --   --    Sample VENOUS VENOUS   < > VENOUS VENOUS VENOUS    < > = values in this interval not displayed.       Microbiology Results (last 7 days)     Procedure Component Value Units Date/Time    Clostridium difficile EIA [[id number]]     Order Status: Canceled Specimen: Stool         ASSESSMENT/PLAN:     CKD stage 4  Edema due to hypoalbuminemia  Hyponatremia  Acidosis  Uncontrolled DM  No NSAIDs, ACEI/ARB, IV contrast or other nephrotoxins.  Keep MAP > 60, SBP > 100.  Dose meds for GFR < 30 ml/min.  Renal diet - low K, low phos. Limit free water intake.  Hold oral BP meds, titrate diuretic as tolerated, keep washington with close IO. Tolerate -160 to help diuresis, keep nicardipine drip.  Control DM.    Anemia of CKD  Hgb and HCT are acceptable. Monitor.  Will provide SHELLEY and/or IV iron PRN.    HTN  BP seem controlled.   Tolerate asymptomatic HTN up to -160.  Stop all oral meds, keep Nicardipine, increase diuretic as tolerated to deal with edema, keep washington.    Thank you for allowing us to participate in the care of your patient!   We will follow the patient and provide recommendations as needed.    Patient care time was spent personally by me on the following activities:   · Obtaining a history.  · Examination of patient.  · Providing medical care at the patients bedside.  · Developing a treatment plan with patient or surrogate and bedside caregivers.  · Ordering and reviewing laboratory studies, radiographic studies, pulse oximetry.  · Ordering and performing treatments and interventions.  · Evaluation of patient's response to treatment.  · Discussions with consultants while on the unit and immediately available to the patient.  · Re-evaluation of the patient's  condition.  · Documentation in the medical record.     Geeta Kaur NP      [facility]  [address] [place] [address]  JEANMARIE Connolly [phone] - tel  [phone] - fax    6/7/2022

## 2022-06-07 NOTE — PROGRESS NOTES
[facility] Hospital Medicine  Progress Note    Patient Name: Tabby Howard  MRN: [id number]  Patient Class: IP- Inpatient   Admission Date: 5/28/2022  Length of Stay: 10 days  Attending Physician: Linda Cueto MD  Primary Care Provider: Primary Doctor No        Subjective:     Principal Problem:PRES (posterior reversible encephalopathy syndrome)        HPI:  Tabby Howard 32-year-old female presents emergency room for evaluation of seizure and hypoglycemia.  Patient has a history of diabetes and gastroparesis.  She reports that she has been taking her insulin but not able to hold any food down due to nausea and vomiting.  She was treated several times in the ER for hypoglycemia using dextrose 50%.  Just before my exam patient was reported to have clonic tonic seizure activity which lasted approximately 1 minute; however her glucose at the time was approximately 120. Previous medical history includes anemia, CKD, type 2 diabetes, gallstones, CKD, chronic diastolic heart failure, and gastroparesis.  ER workup:  CBC with thrombocytopenia of 125.  Original glucose on CMP was 48. BUN 46 and creatinine of 3.9 (baseline).  Urinalysis with 5 red blood cells and rare yeast.  CT the head was negative.  Patient was given Ativan status post seizure as well as given a Keppra loading dose.  Patient be admitted to Hospital Medicine for observation management.  Patient be placed in ICU given continue seizure and hypoglycemia.  Will consult Neurology in a.m..      Overview/Hospital Course:  Tabby Howard is a 32 year old female with a past medical history of IDDM, CKD IV, gastroparesis, and HFpEF who presented with seizure. She was discovered to be hypoglycemic and was started on a D5 infusion (since discontinued). Neurology was consulted. Her BP was observed to be elevated as well and she was started on a Cardene infusion. EEG of the brain did not show epileptiform activity, yet MRI of the brain showed  concern for PRES. Keppra was started per Neurology 5/30. Her mental status continued to improve. Nephrology was consulted 6/1 given the patient's progressive CKD. Savannah was able to be weaned as she started on HCTZ, hydralazine and Imdur.  Nephrology has also scheduled IV Lasix in order to increase her urine output which has mildly improved her kidney function. She was able to be transferred out of the ICU 6/5/2022. She is working with PT/OT who recommends home health.      Interval History: Improving with diuresis. No new complaints. Cr 3.6today    Review of Systems   Genitourinary:  Negative for decreased urine volume.   Skin:  Positive for pallor.   Allergic/Immunologic: Positive for immunocompromised state.   Neurological:  Positive for weakness. Negative for seizures and headaches.   All other systems reviewed and are negative.  Objective:     Vital Signs (Most Recent):  Temp: 98.1 °F (36.7 °C) (06/07/22 1154)  Pulse: 110 (06/07/22 1154)  Resp: 16 (06/07/22 1154)  BP: 129/81 (06/07/22 1154)  SpO2: 97 % (06/07/22 1154)   Vital Signs (24h Range):  Temp:  [97 °F (36.1 °C)-98.4 °F (36.9 °C)] 98.1 °F (36.7 °C)  Pulse:  [104-118] 110  Resp:  [16-18] 16  SpO2:  [92 %-99 %] 97 %  BP: (127-171)/(75-96) 129/81     Weight: 74.2 kg (163 lb 9.3 oz)  Body mass index is 29.92 kg/m².    Intake/Output Summary (Last 24 hours) at 6/7/2022 1157  Last data filed at 6/7/2022 0537  Gross per 24 hour   Intake 120 ml   Output 3150 ml   Net -3030 ml        Physical Exam  Vitals and nursing note reviewed.   Constitutional:       General: She is not in acute distress.     Appearance: She is well-developed. She is ill-appearing. She is not diaphoretic.      Comments: Anasarca.   HENT:      Head: Normocephalic and atraumatic.      Right Ear: External ear normal.      Left Ear: External ear normal.      Nose: Nose normal.   Eyes:      General: No scleral icterus.     Conjunctiva/sclera: Conjunctivae normal.   Neck:      Vascular: No JVD.    Cardiovascular:      Rate and Rhythm: Normal rate and regular rhythm.      Pulses: Normal pulses.      Heart sounds: Normal heart sounds. No murmur heard.    No friction rub. No gallop.   Pulmonary:      Effort: Pulmonary effort is normal. No respiratory distress.      Breath sounds: Normal breath sounds. No wheezing or rales.   Abdominal:      General: Bowel sounds are normal. There is no distension.      Palpations: Abdomen is soft.      Tenderness: There is no abdominal tenderness. There is no guarding or rebound.   Genitourinary:     Comments: Pelayo.  Musculoskeletal:         General: No tenderness. Normal range of motion.      Cervical back: Neck supple.      Right lower leg: No edema.      Left lower leg: No edema.   Skin:     General: Skin is warm and dry.      Coloration: Skin is not pale.      Findings: No erythema or rash.   Neurological:      Mental Status: She is oriented to person, place, and time.       Significant Labs: All pertinent labs within the past 24 hours have been reviewed.    Significant Imaging: I have reviewed all pertinent imaging results/findings within the past 24 hours.      Assessment/Plan:      * PRES (posterior reversible encephalopathy syndrome)  -Seizure precautions  -PRN IV Ativan  -BP control with PO medications; continue to titrate  -Neurology following; will need repeat MRI in three months  -Keppra  -Neurologic checks    Seizure  Induced by hypoglycemia and/or PRES.  -Neurology consulted  -Neuro checks Q4H  -Seizure, fall and aspiration precautions  -PRN IV Ativan  -BP control with HCTZ, hydralazine/nitrate  -Keppra        Gastroparesis  Chronic.  -Renal and diabetic diet  -Hold Reglan as it lowers seizure threshold      CKD (chronic kidney disease), stage IV  -Nephrology following  -Renally dose all medications  -Avoid nephrotoxic agents  -Monitor UOP and electrolytes  -Trend creatinine  -Continue HCTZ and IV Lasix        Anemia of chronic kidney failure  -Trend with  CBC        Type II diabetes mellitus  -SSI  -Detemir 10  -Diabetic renal diet  -AC HS glucose checks  -Hypoglycemic precautions      VTE Risk Mitigation (From admission, onward)         Ordered     heparin (porcine) injection 5,000 Units  Every 8 hours         06/05/22 0816     IP VTE LOW RISK PATIENT  Once         05/29/22 0004     Place sequential compression device  Until discontinued         05/29/22 0004     Place JOCELYNE hose  Until discontinued         05/29/22 0004                Discharge Planning   TATIANA: 6/7/2022     Code Status: Full Code   Is the patient medically ready for discharge?:     Reason for patient still in hospital (select all that apply): Patient trending condition and Treatment  Discharge Plan A: Home with family                  Linda Cueto MD  Department of Hospital Medicine   [facility]

## 2022-06-07 NOTE — PLAN OF CARE
Jemal Souza from [facility] approved RW. CM delivered to pt's bedside. Returned completed delivery ticket to [facility]      06/07/22 9130   Post-Acute Status   Post-Acute Authorization HME   HME Status Set-up Complete/Auth obtained

## 2022-06-07 NOTE — CARE UPDATE
06/07/22 0855   Patient Assessment/Suction   Level of Consciousness (AVPU) alert   PRE-TX-O2   O2 Device (Oxygen Therapy) room air   SpO2 99 %   Pulse Oximetry Type Intermittent   $ Pulse Oximetry - Multiple Charge Pulse Oximetry - Multiple   Aerosol Therapy   $ Aerosol Therapy Charges PRN treatment not required

## 2022-06-07 NOTE — PT/OT/SLP PROGRESS
Physical Therapy      Patient Name:  Tabby Howard   MRN:  [id number]    Patient not seen today secondary to Other (Comment). First attempt 13:17, pt had just returned to bed and declined therapy. Second attempt 14:16, pt refused due to low blood sugar. Will follow-up tomorrow if discharge does not proceed.

## 2022-06-07 NOTE — PLAN OF CARE
Pt clear for DC from case management standpoint. Discharging to home       06/07/22 1527   Final Note   Assessment Type Final Discharge Note   Anticipated Discharge Disposition Home   Hospital Resources/Appts/Education Provided Appointments scheduled and added to AVS

## 2022-06-07 NOTE — PLAN OF CARE
Pt in bed asleep, no distress noted. Pt denies any pain. Pt was anxious and was crying this morning because according to her she misses her family. Pt was medicate with Clonazepam and she went to sleep. Pierce is D/Blu at 5:46am and she is due to void. Pericare was done.  Problem: Adult Inpatient Plan of Care  Goal: Plan of Care Review  Outcome: Ongoing, Progressing  Goal: Patient-Specific Goal (Individualized)  Outcome: Ongoing, Progressing  Goal: Optimal Comfort and Wellbeing  Outcome: Ongoing, Progressing     Problem: Diabetes Comorbidity  Goal: Blood Glucose Level Within Targeted Range  Outcome: Ongoing, Progressing     Problem: Fluid and Electrolyte Imbalance (Acute Kidney Injury/Impairment)  Goal: Fluid and Electrolyte Balance  Outcome: Ongoing, Progressing

## 2022-06-07 NOTE — SUBJECTIVE & OBJECTIVE
Interval History: Improving with diuresis. No new complaints. Cr 3.6today    Review of Systems   Genitourinary:  Negative for decreased urine volume.   Skin:  Positive for pallor.   Allergic/Immunologic: Positive for immunocompromised state.   Neurological:  Positive for weakness. Negative for seizures and headaches.   All other systems reviewed and are negative.  Objective:     Vital Signs (Most Recent):  Temp: 98.1 °F (36.7 °C) (06/07/22 1154)  Pulse: 110 (06/07/22 1154)  Resp: 16 (06/07/22 1154)  BP: 129/81 (06/07/22 1154)  SpO2: 97 % (06/07/22 1154)   Vital Signs (24h Range):  Temp:  [97 °F (36.1 °C)-98.4 °F (36.9 °C)] 98.1 °F (36.7 °C)  Pulse:  [104-118] 110  Resp:  [16-18] 16  SpO2:  [92 %-99 %] 97 %  BP: (127-171)/(75-96) 129/81     Weight: 74.2 kg (163 lb 9.3 oz)  Body mass index is 29.92 kg/m².    Intake/Output Summary (Last 24 hours) at 6/7/2022 1157  Last data filed at 6/7/2022 0537  Gross per 24 hour   Intake 120 ml   Output 3150 ml   Net -3030 ml        Physical Exam  Vitals and nursing note reviewed.   Constitutional:       General: She is not in acute distress.     Appearance: She is well-developed. She is ill-appearing. She is not diaphoretic.      Comments: Anasarca.   HENT:      Head: Normocephalic and atraumatic.      Right Ear: External ear normal.      Left Ear: External ear normal.      Nose: Nose normal.   Eyes:      General: No scleral icterus.     Conjunctiva/sclera: Conjunctivae normal.   Neck:      Vascular: No JVD.   Cardiovascular:      Rate and Rhythm: Normal rate and regular rhythm.      Pulses: Normal pulses.      Heart sounds: Normal heart sounds. No murmur heard.    No friction rub. No gallop.   Pulmonary:      Effort: Pulmonary effort is normal. No respiratory distress.      Breath sounds: Normal breath sounds. No wheezing or rales.   Abdominal:      General: Bowel sounds are normal. There is no distension.      Palpations: Abdomen is soft.      Tenderness: There is no abdominal  tenderness. There is no guarding or rebound.   Genitourinary:     Comments: Pelayo.  Musculoskeletal:         General: No tenderness. Normal range of motion.      Cervical back: Neck supple.      Right lower leg: No edema.      Left lower leg: No edema.   Skin:     General: Skin is warm and dry.      Coloration: Skin is not pale.      Findings: No erythema or rash.   Neurological:      Mental Status: She is oriented to person, place, and time.       Significant Labs: All pertinent labs within the past 24 hours have been reviewed.    Significant Imaging: I have reviewed all pertinent imaging results/findings within the past 24 hours.

## 2022-06-07 NOTE — PLAN OF CARE
Reviewed discharge instructions with patient. All questions answered. Patient verbalizes understanding. Awaiting ride.

## 2022-06-07 NOTE — PLAN OF CARE
Patient called and stated she felt her sugar was dropping. Accucheck 62. Provided pt with juice, sheryl crackers, and pudding. Remains alert and answers questions appropriately.

## 2022-06-07 NOTE — DISCHARGE SUMMARY
[facility] Medicine  Discharge Summary      Patient Name: Tabby Howard  MRN: [id number]  Patient Class: IP- Inpatient  Admission Date: 5/28/2022  Hospital Length of Stay: 10 days  Discharge Date and Time: No discharge date for patient encounter.  Attending Physician: Linda Cueto MD   Discharging Provider: Linda Cueto MD  Primary Care Provider: Primary Doctor No      HPI:   Tabby Howard 32-year-old female presents emergency room for evaluation of seizure and hypoglycemia.  Patient has a history of diabetes and gastroparesis.  She reports that she has been taking her insulin but not able to hold any food down due to nausea and vomiting.  She was treated several times in the ER for hypoglycemia using dextrose 50%.  Just before my exam patient was reported to have clonic tonic seizure activity which lasted approximately 1 minute; however her glucose at the time was approximately 120. Previous medical history includes anemia, CKD, type 2 diabetes, gallstones, CKD, chronic diastolic heart failure, and gastroparesis.  ER workup:  CBC with thrombocytopenia of 125.  Original glucose on CMP was 48. BUN 46 and creatinine of 3.9 (baseline).  Urinalysis with 5 red blood cells and rare yeast.  CT the head was negative.  Patient was given Ativan status post seizure as well as given a Keppra loading dose.  Patient be admitted to Hospital Medicine for observation management.  Patient be placed in ICU given continue seizure and hypoglycemia.  Will consult Neurology in a.m..      * No surgery found *      Hospital Course:   Tabby Howard is a 32 year old female with a past medical history of IDDM, CKD IV, gastroparesis, and HFpEF who presented with seizure. She was discovered to be hypoglycemic and was started on a D5 infusion (since discontinued). Neurology was consulted. Her BP was observed to be elevated as well and she was started on a Cardene infusion. EEG of the brain did not show  epileptiform activity, yet MRI of the brain showed concern for PRES. Keppra was started per Neurology 5/30. Her mental status continued to improve. Nephrology was consulted 6/1 given the patient's progressive CKD. Cardene was able to be weaned as she started on HCTZ, hydralazine and Imdur.  Nephrology has also scheduled IV Lasix in order to increase her urine output which has mildly improved her kidney function. She was able to be transferred out of the ICU 6/5/2022. She is working with PT/OT. Her kidney function continued to improve and she was cleared for discharge by nephrology on BID PO Lasix and will get repeat labs Monday and follow up with PCP and nephrology clinic. She will also follow up with neurology. She needs a repeat MRI in 3 months. Was instructed at length on seizure precautions.       Goals of Care Treatment Preferences:  Code Status: Full Code      Consults:   Consults (From admission, onward)        Status Ordering Provider     Inpatient consult to Midline team  Once        Provider:  (Not yet assigned)    Completed JOSE GUADALUPE MOTT     Inpatient consult to Nephrology  Once        Provider:  Mando Benitez MD    Completed LUIS HAMPTON     Inpatient consult to Midline team  Once        Provider:  (Not yet assigned)    Completed LUIS HAMPTON     Inpatient consult to Neurology  Once        Provider:  Neptali Marley MD    Completed HARRY KUHN     IP consult to dietary  Once        Provider:  (Not yet assigned)    Completed HARRY KUHN          No new Assessment & Plan notes have been filed under this hospital service since the last note was generated.  Service: Hospital Medicine    Final Active Diagnoses:    Diagnosis Date Noted POA    PRINCIPAL PROBLEM:  PRES (posterior reversible encephalopathy syndrome) [I67.83] 05/30/2022 Yes    Seizure [R56.9] 05/29/2022 Yes    CKD (chronic kidney disease), stage IV [N18.4] 04/12/2022 Yes    Gastroparesis [K31.84] 04/12/2022 Yes  "   Anemia of chronic kidney failure [N18.9, D63.1] 04/24/2021 Yes    Type II diabetes mellitus [E11.9] 10/16/2019 Yes      Problems Resolved During this Admission:    Diagnosis Date Noted Date Resolved POA    Hypertensive emergency [I16.1] 06/01/2022 06/04/2022 Yes    Thrombocytopenia [D69.6] 05/30/2022 06/04/2022 Yes       Discharged Condition: good    Disposition: Home or Self Care    Follow Up:   Follow-up Information     Prateek Clark MD Follow up in 2 week(s).    Specialty: Nephrology  Why: please call to schedule apt after DC  Contact information:  [address]  [facility]  [place] [address]  [phone]             [facility] Follow up on 6/17/2022.    Why: 2:30 PM for hospital follow up  Contact information:  [address]  [facility] [address]           Neptali Marley MD Follow up on 6/8/2022.    Specialty: Neurology  Why: 3 pm    ONLY APT AVAILABLE TO BE SEEN BEFORE JULY  Contact information:  [address]  [place] [address]  [phone]                       Patient Instructions:      WALKER FOR HOME USE     Order Specific Question Answer Comments   Type of Walker: Adult (5'4"-6'6")    With wheels? Yes    Height: 5' 2" (1.575 m)    Weight: 76.7 kg (169 lb 1.5 oz)    Length of need (1-99 months): 99    Does patient have medical equipment at home? none    Please check all that apply: Patient's condition impairs ambulation.      RENAL FUNCTION PANEL   Standing Status: Future Standing Exp. Date: 08/06/23     Ambulatory referral/consult to Physical/Occupational Therapy   Standing Status: Future   Referral Priority: Routine Referral Type: Physical Medicine   Referral Reason: Specialty Services Required   Number of Visits Requested: 1     No driving until:     Notify your health care provider if you experience any of the following:  temperature >100.4     Notify your health care provider if you experience any of the " following:  persistent nausea and vomiting or diarrhea     Notify your health care provider if you experience any of the following:  persistent dizziness, light-headedness, or visual disturbances     Notify your health care provider if you experience any of the following:  increased confusion or weakness     Activity as tolerated       Significant Diagnostic Studies: Labs:   CMP   Recent Labs   Lab 06/06/22  0839 06/07/22  0746    136   K 4.0 4.0    105   CO2 22* 21*   * 131*   BUN 50* 49*   CREATININE 4.0* 3.6*   CALCIUM 8.2* 8.1*   PROT 4.7* 4.6*   ALBUMIN 1.7* 1.7*   BILITOT 0.6 0.5   ALKPHOS 59 56   AST 13 12   ALT 11 8*   ANIONGAP 10 10   ESTGFRAFRICA 16* 18*   EGFRNONAA 14* 16*    and CBC   Recent Labs   Lab 06/06/22  0838 06/07/22  0746   WBC 9.31 7.98   HGB 8.9* 9.0*   HCT 25.1* 24.9*    182     MRI Brain Without Contrast  Order: [id number]   Status: Final result     Visible to patient: Yes (not seen)     Next appt: None     0 Result Notes    Details    Reading Physician Reading Date Result Priority   Leigh Ann Harris MD  [phone] 5/29/2022 STAT     Narrative & Impression  EXAMINATION:  MRI BRAIN WITHOUT CONTRAST     CLINICAL HISTORY:  Head trauma, abnormal mental status (Age 19-64y); seizures, change in mental status     TECHNIQUE:  Multiplanar multisequence MR imaging of the brain was performed without contrast.     COMPARISON:  Head CT 05/28/2022     FINDINGS:  Diffusion-weighted images demonstrate no evidence of restricted diffusion to indicate an acute infarct.  There is no evidence of chronic infarction.  The ventricular system and cortical sulcal markings are appropriate for the patient's age.  There are normal flow voids in the basilar and internal carotid arteries.     There is symmetrical high T2 signal on the FLAIR images in the bilateral occipital lobes.  There is no hemorrhage, midline shift or hydrocephalus.  There is mild mucosal thickening in the inferior left  maxillary antrum.  There are small amounts of fluid in the mastoid air cells bilaterally.     Impression:     Findings consistent with posterior reversible encephalopathy syndrome/PRES.     Incidentally noted maxillary sinusitis, fluid in mastoid air cells.     This report was flagged in Epic as abnormal.        Electronically signed by: Leigh Ann Ty Kimberly  Date:                                            05/29/2022  Time:                                           17:53     CT Head Without Contrast  Order: [id number]   Status: Final result     Visible to patient: Yes (not seen)     Next appt: None     0 Result Notes    Details    Reading Physician Reading Date Result Priority   Gianluca Juarez MD  [phone] 5/28/2022 STAT     Narrative & Impression  EXAMINATION:  CT HEAD WITHOUT CONTRAST     CLINICAL HISTORY:  Seizure, new-onset, no history of trauma;     TECHNIQUE:  Low dose axial images were obtained through the head.  Coronal and sagittal reformations were also performed. Contrast was not administered.     COMPARISON:  None.     FINDINGS:  There is a 2.2 x 1.1 cm area of high density in the subcutaneous tissues posterior to the trapezius muscle at the level of the internal occipital protuberance.  The remainder of the scalp and calvarium appear unremarkable.     The brain parenchyma appears normal in attenuation with normal gray-white matter differentiation and no mass effect or pathologic fluid collection.  There is no ventriculomegaly.     Mild mucosal thickening in the left maxillary sinus is present.  No air-fluid levels are seen.  The orbits and orbital contents appear unremarkable.     Impression:     Negative CT of the brain for hemorrhage mass or infarction.     Small area of high density posterior to the trapezius muscle.  Correlate for posterior neck skull junction soft tissue hematoma.        Electronically signed by: Gianluca Juarez  Date:                                             05/28/2022  Time:                                           21:10       Pending Diagnostic Studies:     Procedure Component Value Units Date/Time    JUAN A [[id number]] Collected: 06/07/22 0746    Order Status: Sent Lab Status: In process Updated: 06/07/22 1518    Specimen: Blood          Medications:  Reconciled Home Medications:      Medication List      START taking these medications    FLUoxetine 20 MG capsule  Take 1 capsule (20 mg total) by mouth once daily.  Start taking on: June 8, 2022     furosemide 40 MG tablet  Commonly known as: LASIX  Take 1 tablet (40 mg total) by mouth 2 (two) times daily.     isosorbide mononitrate 60 MG 24 hr tablet  Commonly known as: IMDUR  Take 1 tablet (60 mg total) by mouth once daily.  Start taking on: June 8, 2022     levETIRAcetam 500 MG Tab  Commonly known as: KEPPRA  Take 1 tablet (500 mg total) by mouth 2 (two) times daily.        CHANGE how you take these medications    BASAGLAR KWIKPEN U-100 INSULIN glargine 100 units/mL (3mL) SubQ pen  Generic drug: insulin  Inject 10 Units into the skin every evening.  What changed: how much to take        CONTINUE taking these medications    acetaminophen 325 MG tablet  Commonly known as: TYLENOL  Take 2 tablets (650 mg total) by mouth every 6 (six) hours as needed for Pain or Temperature greater than (100.3).     APIDRA U-100 INSULIN 100 unit/mL injection  Generic drug: insulin glulisine U-100  Inject 8 Units into the skin 3 (three) times daily before meals.     blood-glucose meter kit  Use as instructed        STOP taking these medications    cephALEXin 500 MG capsule  Commonly known as: KEFLEX     doxycycline 100 MG Cap  Commonly known as: VIBRAMYCIN     ibuprofen 600 MG tablet  Commonly known as: ADVIL,MOTRIN     metoclopramide HCl 10 MG tablet  Commonly known as: REGLAN     ondansetron 4 MG tablet  Commonly known as: ZOFRAN     ondansetron 4 MG Tbdl  Commonly known as: ZOFRAN-ODT     oxyCODONE 5 MG immediate release  tablet  Commonly known as: ROXICODONE     pantoprazole 40 MG tablet  Commonly known as: PROTONIX     polyethylene glycol 17 gram Pwpk  Commonly known as: GLYCOLAX     promethazine 25 MG suppository  Commonly known as: PHENERGAN     senna-docusate 8.6-50 mg 8.6-50 mg per tablet  Commonly known as: SENNA WITH DOCUSATE SODIUM            Indwelling Lines/Drains at time of discharge:   Lines/Drains/Airways     None                 Time spent on the discharge of patient: 35 minutes         Linda Cueto MD  Department of Hospital Medicine  [facility]

## 2022-06-07 NOTE — PLAN OF CARE
Resting intermittently. OOB in chair for approx 2 hours. Respirations unlabored. Abdomen soft. Pelayo intact with good urine output. Diuresing with lasix. CR 4.0. Remains edematous.   Safety maintained.

## 2022-06-08 ENCOUNTER — PATIENT OUTREACH (OUTPATIENT)
Dept: ADMINISTRATIVE | Facility: CLINIC | Age: 32
End: 2022-06-08
Payer: MEDICAID

## 2022-06-08 ENCOUNTER — PATIENT MESSAGE (OUTPATIENT)
Dept: ADMINISTRATIVE | Facility: CLINIC | Age: 32
End: 2022-06-08
Payer: MEDICAID

## 2022-06-08 NOTE — PROGRESS NOTES
C3 nurse attempted to contact Tabby Howard for a TCC post hospital discharge follow up call. No answer. Left voicemail with callback information. Message sent via [facility] portal for Post Discharge Attempt with callback information. The patient has a scheduled HOSFU appointment with [facility] on 6/17/2022 @ 1430 per AVS.  This provider is not within [place].

## 2022-06-09 LAB — ANA SER QL IF: NORMAL

## 2022-06-09 NOTE — PROGRESS NOTES
C3 nurse attempted to contact Jasanya Lee for a TCC post hospital discharge follow up call. No answer. Left voicemail with callback information. Message left with father, Garcia Howard/ The patient has a scheduled HOSFU appointment with [facility] on 6/17/2022 @ 1430 per AVS.  This provider is not within [place].

## 2022-06-11 ENCOUNTER — HOSPITAL ENCOUNTER (EMERGENCY)
Facility: HOSPITAL | Age: 32
Discharge: HOME | End: 2022-06-11
Attending: EMERGENCY MEDICINE
Payer: MEDICAID

## 2022-06-11 VITALS
SYSTOLIC BLOOD PRESSURE: 173 MMHG | WEIGHT: 163 LBS | RESPIRATION RATE: 18 BRPM | DIASTOLIC BLOOD PRESSURE: 105 MMHG | HEART RATE: 100 BPM | HEIGHT: 62 IN | BODY MASS INDEX: 30 KG/M2 | TEMPERATURE: 98 F | OXYGEN SATURATION: 99 %

## 2022-06-11 DIAGNOSIS — R00.0 TACHYCARDIA: ICD-10-CM

## 2022-06-11 DIAGNOSIS — N18.9 CHRONIC KIDNEY DISEASE, UNSPECIFIED CKD STAGE: Primary | ICD-10-CM

## 2022-06-11 DIAGNOSIS — R60.1 ANASARCA: ICD-10-CM

## 2022-06-11 LAB
ALBUMIN SERPL BCP-MCNC: 2.2 G/DL (ref 3.5–5.2)
ALP SERPL-CCNC: 66 U/L (ref 55–135)
ALT SERPL W/O P-5'-P-CCNC: 11 U/L (ref 10–44)
ANION GAP SERPL CALC-SCNC: 11 MMOL/L (ref 8–16)
AST SERPL-CCNC: 16 U/L (ref 10–40)
B-HCG UR QL: NEGATIVE
BACTERIA #/AREA URNS HPF: NORMAL /HPF
BASOPHILS # BLD AUTO: 0.05 K/UL (ref 0–0.2)
BASOPHILS NFR BLD: 0.6 % (ref 0–1.9)
BILIRUB SERPL-MCNC: 1.2 MG/DL (ref 0.1–1)
BILIRUB UR QL STRIP: NEGATIVE
BUN SERPL-MCNC: 42 MG/DL (ref 6–20)
CALCIUM SERPL-MCNC: 8.5 MG/DL (ref 8.7–10.5)
CHLORIDE SERPL-SCNC: 106 MMOL/L (ref 95–110)
CLARITY UR: CLEAR
CO2 SERPL-SCNC: 22 MMOL/L (ref 23–29)
COLOR UR: YELLOW
CREAT SERPL-MCNC: 3.2 MG/DL (ref 0.5–1.4)
DIFFERENTIAL METHOD: ABNORMAL
EOSINOPHIL # BLD AUTO: 0.1 K/UL (ref 0–0.5)
EOSINOPHIL NFR BLD: 1.7 % (ref 0–8)
ERYTHROCYTE [DISTWIDTH] IN BLOOD BY AUTOMATED COUNT: 12.8 % (ref 11.5–14.5)
EST. GFR  (AFRICAN AMERICAN): 21 ML/MIN/1.73 M^2
EST. GFR  (NON AFRICAN AMERICAN): 18 ML/MIN/1.73 M^2
GLUCOSE SERPL-MCNC: 222 MG/DL (ref 70–110)
GLUCOSE UR QL STRIP: ABNORMAL
HCT VFR BLD AUTO: 20.4 % (ref 37–48.5)
HGB BLD-MCNC: 7.4 G/DL (ref 12–16)
HGB UR QL STRIP: ABNORMAL
HYALINE CASTS #/AREA URNS LPF: 0 /LPF
IMM GRANULOCYTES # BLD AUTO: 0.04 K/UL (ref 0–0.04)
IMM GRANULOCYTES NFR BLD AUTO: 0.5 % (ref 0–0.5)
KETONES UR QL STRIP: NEGATIVE
LACTATE SERPL-SCNC: 0.6 MMOL/L (ref 0.5–2.2)
LACTATE SERPL-SCNC: 0.7 MMOL/L (ref 0.5–2.2)
LEUKOCYTE ESTERASE UR QL STRIP: NEGATIVE
LYMPHOCYTES # BLD AUTO: 1 K/UL (ref 1–4.8)
LYMPHOCYTES NFR BLD: 13.2 % (ref 18–48)
MCH RBC QN AUTO: 29 PG (ref 27–31)
MCHC RBC AUTO-ENTMCNC: 36.3 G/DL (ref 32–36)
MCV RBC AUTO: 80 FL (ref 82–98)
MICROSCOPIC COMMENT: NORMAL
MONOCYTES # BLD AUTO: 0.4 K/UL (ref 0.3–1)
MONOCYTES NFR BLD: 5.6 % (ref 4–15)
NEUTROPHILS # BLD AUTO: 6.1 K/UL (ref 1.8–7.7)
NEUTROPHILS NFR BLD: 78.4 % (ref 38–73)
NITRITE UR QL STRIP: NEGATIVE
NRBC BLD-RTO: 0 /100 WBC
PH UR STRIP: 7 [PH] (ref 5–8)
PLATELET # BLD AUTO: 167 K/UL (ref 150–450)
PMV BLD AUTO: 9.6 FL (ref 9.2–12.9)
POTASSIUM SERPL-SCNC: 4.5 MMOL/L (ref 3.5–5.1)
PROT SERPL-MCNC: 5.6 G/DL (ref 6–8.4)
PROT UR QL STRIP: ABNORMAL
RBC # BLD AUTO: 2.55 M/UL (ref 4–5.4)
RBC #/AREA URNS HPF: 3 /HPF (ref 0–4)
SODIUM SERPL-SCNC: 139 MMOL/L (ref 136–145)
SP GR UR STRIP: 1.01 (ref 1–1.03)
SQUAMOUS #/AREA URNS HPF: 2 /HPF
URN SPEC COLLECT METH UR: ABNORMAL
UROBILINOGEN UR STRIP-ACNC: NEGATIVE EU/DL
WBC # BLD AUTO: 7.83 K/UL (ref 3.9–12.7)
WBC #/AREA URNS HPF: 4 /HPF (ref 0–5)

## 2022-06-11 PROCEDURE — 83605 ASSAY OF LACTIC ACID: CPT | Performed by: EMERGENCY MEDICINE

## 2022-06-11 PROCEDURE — 93010 EKG 12-LEAD: ICD-10-PCS | Mod: ,,, | Performed by: SPECIALIST

## 2022-06-11 PROCEDURE — 87040 BLOOD CULTURE FOR BACTERIA: CPT | Mod: 59 | Performed by: EMERGENCY MEDICINE

## 2022-06-11 PROCEDURE — 81000 URINALYSIS NONAUTO W/SCOPE: CPT | Performed by: EMERGENCY MEDICINE

## 2022-06-11 PROCEDURE — 96361 HYDRATE IV INFUSION ADD-ON: CPT

## 2022-06-11 PROCEDURE — 93005 ELECTROCARDIOGRAM TRACING: CPT

## 2022-06-11 PROCEDURE — 36415 COLL VENOUS BLD VENIPUNCTURE: CPT | Performed by: EMERGENCY MEDICINE

## 2022-06-11 PROCEDURE — 81025 URINE PREGNANCY TEST: CPT | Performed by: EMERGENCY MEDICINE

## 2022-06-11 PROCEDURE — 25000003 PHARM REV CODE 250: Performed by: EMERGENCY MEDICINE

## 2022-06-11 PROCEDURE — 96365 THER/PROPH/DIAG IV INF INIT: CPT

## 2022-06-11 PROCEDURE — 63600175 PHARM REV CODE 636 W HCPCS: Performed by: EMERGENCY MEDICINE

## 2022-06-11 PROCEDURE — 96375 TX/PRO/DX INJ NEW DRUG ADDON: CPT

## 2022-06-11 PROCEDURE — 80053 COMPREHEN METABOLIC PANEL: CPT | Performed by: EMERGENCY MEDICINE

## 2022-06-11 PROCEDURE — 96374 THER/PROPH/DIAG INJ IV PUSH: CPT | Mod: 59

## 2022-06-11 PROCEDURE — 93010 ELECTROCARDIOGRAM REPORT: CPT | Mod: ,,, | Performed by: SPECIALIST

## 2022-06-11 PROCEDURE — 99291 CRITICAL CARE FIRST HOUR: CPT | Mod: 25

## 2022-06-11 PROCEDURE — 85025 COMPLETE CBC W/AUTO DIFF WBC: CPT | Performed by: EMERGENCY MEDICINE

## 2022-06-11 RX ORDER — HYDROCODONE BITARTRATE AND ACETAMINOPHEN 10; 325 MG/1; MG/1
1 TABLET ORAL EVERY 4 HOURS PRN
Qty: 12 TABLET | Refills: 0 | Status: SHIPPED | OUTPATIENT
Start: 2022-06-11 | End: 2022-07-07

## 2022-06-11 RX ORDER — MAG HYDROX/ALUMINUM HYD/SIMETH 200-200-20
30 SUSPENSION, ORAL (FINAL DOSE FORM) ORAL
Status: COMPLETED | OUTPATIENT
Start: 2022-06-11 | End: 2022-06-11

## 2022-06-11 RX ORDER — LABETALOL HYDROCHLORIDE 5 MG/ML
10 INJECTION, SOLUTION INTRAVENOUS
Status: COMPLETED | OUTPATIENT
Start: 2022-06-11 | End: 2022-06-11

## 2022-06-11 RX ORDER — TORSEMIDE 20 MG/1
20 TABLET ORAL 2 TIMES DAILY
Qty: 60 TABLET | Refills: 1 | Status: SHIPPED | OUTPATIENT
Start: 2022-06-11 | End: 2022-07-15

## 2022-06-11 RX ORDER — MORPHINE SULFATE 2 MG/ML
6 INJECTION, SOLUTION INTRAMUSCULAR; INTRAVENOUS
Status: COMPLETED | OUTPATIENT
Start: 2022-06-11 | End: 2022-06-11

## 2022-06-11 RX ADMIN — CEFTRIAXONE 1 G: 1 INJECTION, SOLUTION INTRAVENOUS at 10:06

## 2022-06-11 RX ADMIN — MORPHINE SULFATE 6 MG: 2 INJECTION, SOLUTION INTRAMUSCULAR; INTRAVENOUS at 09:06

## 2022-06-11 RX ADMIN — LABETALOL HYDROCHLORIDE 10 MG: 5 INJECTION INTRAVENOUS at 12:06

## 2022-06-11 RX ADMIN — ALUMINUM HYDROXIDE, MAGNESIUM HYDROXIDE, AND SIMETHICONE 30 ML: 200; 200; 20 SUSPENSION ORAL at 02:06

## 2022-06-11 RX ADMIN — SODIUM CHLORIDE, SODIUM LACTATE, POTASSIUM CHLORIDE, AND CALCIUM CHLORIDE 2217 ML: .6; .31; .03; .02 INJECTION, SOLUTION INTRAVENOUS at 09:06

## 2022-06-11 NOTE — ED PROVIDER NOTES
Encounter Date: 6/11/2022    SCRIBE #1 NOTE: I, Shelbi Islas am scribing for, and in the presence of, Gurmeet Barrera MD.       History     Chief Complaint   Patient presents with    Flank Pain     Left side x 2 days  / recent admit      Time seen by provider: 8:52 AM on 06/11/2022    Tabby Howard is a 32 y.o. female with DM II, SVT, pericardial effusion, GERD, heart failure with preserved ejection fraction, Sickle cell trait, gastroparesis and CKD (stage 4) who presents to the ED for evaluation of left-sided flank pain for 2 days.  Patient was recently evaluated on 05/28/22 for a seizure induced by hypoglycemia and found to have PRES on MRI of brain with worsening CKD followed by admittance to the hospital.  She was started on HCTZ, Hydralizine, Imdur, Keppra, and Furosemide and was discharged on 06/07/22 without Hydralizine or HCTZ.  Patient states that 2 days post discharge her L upper back pain and central abdominal pain began.  She believes current Sx are due to excess fluid and communicates that she has a follow up appointment on 06/17/22.  Patient notes similar Sx in the past and communicates that dialysis has been recommended without her starting it.  She has seen Dr. Uriel Rma in nephrology with last visit on 05/11/22.  Patient had a past CT Abdomen Pelvis With Contrast on 04/24/22 indicating findings that are suspicious for developing pyelonephritis.  The patient denies N/V/D, fever, painful urination or any other symptoms at this time.  PSHx includes EGD.      The history is provided by the patient.     Review of patient's allergies indicates:   Allergen Reactions    Penicillins Hives     Past Medical History:   Diagnosis Date    CKD (chronic kidney disease), stage IV 4/12/2022    Diabetes mellitus due to underlying condition with unspecified complications 4/12/2022    Gastroparesis 4/12/2022    Heart failure with preserved ejection fraction 4/12/2022    EF 55% on 3/22    History of  gastroesophageal reflux (GERD)     History of supraventricular tachycardia     Sickle cell trait 2022    Type 2 diabetes mellitus      Past Surgical History:   Procedure Laterality Date     SECTION      x 3    ESOPHAGOGASTRODUODENOSCOPY N/A 10/18/2019    Procedure: ESOPHAGOGASTRODUODENOSCOPY (EGD);  Surgeon: Gianluca Mendez MD;  Location[facility];  Service: Endoscopy;  Laterality: N/A;     Family History   Problem Relation Age of Onset    Diabetes Mother     Diabetes Father      Social History     Tobacco Use    Smoking status: Never Smoker    Smokeless tobacco: Never Used   Substance Use Topics    Alcohol use: No    Drug use: No     Review of Systems   Constitutional: Negative for fever.   HENT: Negative for sore throat.    Respiratory: Negative for shortness of breath.    Cardiovascular: Negative for chest pain.   Gastrointestinal: Positive for abdominal pain. Negative for diarrhea, nausea and vomiting.   Genitourinary: Positive for flank pain (left). Negative for dysuria.   Musculoskeletal: Positive for back pain.   Skin: Negative for rash.   Neurological: Negative for weakness.   Hematological: Does not bruise/bleed easily.       Physical Exam     Initial Vitals [22 0824]   BP Pulse Resp Temp SpO2   (!) 184/113 (!) 115 18 98.2 °F (36.8 °C) 100 %      MAP       --         Physical Exam    Nursing note and vitals reviewed.  Constitutional: Vital signs are normal. She appears well-developed and well-nourished.  Non-toxic appearance. No distress.   Patient is crying on exam.     HENT:   Head: Normocephalic and atraumatic.   Eyes: EOM are normal. Pupils are equal, round, and reactive to light.   Neck: Neck supple. No JVD present.   Normal range of motion.  Cardiovascular: Regular rhythm, normal heart sounds and intact distal pulses. Tachycardia present.  Exam reveals no gallop and no friction rub.    No murmur heard.  Pulmonary/Chest: Breath sounds normal. She has no wheezes. She has  no rhonchi. She has no rales.   Abdominal: Abdomen is soft. Bowel sounds are normal. She exhibits no distension. There is no abdominal tenderness.   There is left CVA tenderness. There is no rebound and no guarding.   Musculoskeletal:         General: Normal range of motion.      Cervical back: Normal range of motion and neck supple. No rigidity.     Neurological: She is alert and oriented to person, place, and time. She has normal strength and normal reflexes. No cranial nerve deficit or sensory deficit. She exhibits normal muscle tone. Coordination normal. GCS eye subscore is 4. GCS verbal subscore is 5. GCS motor subscore is 6.   Skin: Skin is warm and dry.   Psychiatric: She has a normal mood and affect. Her speech is normal and behavior is normal. She is not actively hallucinating.         ED Course   Critical Care    Date/Time: 6/11/2022 9:46 AM  Performed by: Gurmeet Barrera MD  Authorized by: Gurmeet Barrera MD   Total critical care time (exclusive of procedural time) : 40 minutes  Critical care time was exclusive of separately billable procedures and treating other patients and teaching time.  Critical care was necessary to treat or prevent imminent or life-threatening deterioration of the following conditions: sepsis.  Critical care was time spent personally by me on the following activities: blood draw for specimens, development of treatment plan with patient or surrogate, discussions with consultants, interpretation of cardiac output measurements, evaluation of patient's response to treatment, examination of patient, obtaining history from patient or surrogate, ordering and performing treatments and interventions, ordering and review of laboratory studies, ordering and review of radiographic studies, pulse oximetry, re-evaluation of patient's condition and review of old charts.        Labs Reviewed   CBC W/ AUTO DIFFERENTIAL - Abnormal; Notable for the following components:       Result Value     RBC 2.55 (*)     Hemoglobin 7.4 (*)     Hematocrit 20.4 (*)     MCV 80 (*)     MCHC 36.3 (*)     Gran % 78.4 (*)     Lymph % 13.2 (*)     All other components within normal limits   COMPREHENSIVE METABOLIC PANEL - Abnormal; Notable for the following components:    CO2 22 (*)     Glucose 222 (*)     BUN 42 (*)     Creatinine 3.2 (*)     Calcium 8.5 (*)     Total Protein 5.6 (*)     Albumin 2.2 (*)     Total Bilirubin 1.2 (*)     eGFR if  21 (*)     eGFR if non  18 (*)     All other components within normal limits   URINALYSIS, REFLEX TO URINE CULTURE - Abnormal; Notable for the following components:    Protein, UA 3+ (*)     Glucose, UA 2+ (*)     Occult Blood UA 1+ (*)     All other components within normal limits    Narrative:     Specimen Source->Urine   CULTURE, BLOOD   CULTURE, BLOOD   LACTIC ACID, PLASMA   LACTIC ACID, PLASMA   PREGNANCY TEST, URINE RAPID    Narrative:     Specimen Source->Urine   URINALYSIS MICROSCOPIC    Narrative:     Specimen Source->Urine     EKG Readings: (Independently Interpreted)   Sinus tachycardia at a rate of 106 bpm with low voltage QRS, normal T waves and normal ST segments.       Imaging Results          CT Renal Stone Study ABD Pelvis WO (Final result)  Result time 06/11/22 12:20:48    Final result by Leigh Ann Harris MD (06/11/22 12:20:48)                 Impression:      Anasarca.  Ascites.  Pleural and pericardial effusions.  Mild interstitial edema in the lung bases.    Gallstones.  Atherosclerosis.  Recurrent or chronic urinary bladder wall thickening suggests cystitis.    Gastric wall thickening is questioned and raises the possibility of gastritis; however, findings may be artifactual, please see comments above.      Electronically signed by: Leigh Ann Harris  Date:    06/11/2022  Time:    12:20             Narrative:    EXAMINATION:  CT RENAL STONE STUDY ABD PELVIS WO    CLINICAL HISTORY:  Flank pain, kidney stone suspected; left  flank pain    TECHNIQUE:  Low dose axial images, sagittal and coronal reformations were obtained from the lung bases to the pubic symphysis.  Contrast was not administered.    COMPARISON:  04/24/2021    FINDINGS:  Since the previous examination there has been development of a moderate pericardial effusion and trace pleural effusions.  There is a moderate quantity of ascites throughout the abdomen and pelvis.  There is diffuse subcutaneous and mesenteric fat stranding consistent with anasarca/volume overload.    There is interlobular septal thickening at the lung bases which may indicate mild interstitial edema.  There is dependent atelectasis in the left lung base.    There are gallstones.  There is extensive calcific plaque in the splenic artery.    The liver, spleen, adrenal glands, pancreas and right kidney are grossly normal.  There is non rotation of the left kidney, and a duplicated left renal collecting system is present.  Previously described hydronephrosis involving the lower pole moiety is not clearly identified today.    The urinary bladder appears thick walled, but is unchanged.  The uterus is present.  There is suggestion of gastric wall thickening and edema, but this is difficult to assess as the stomach is collapsed, and may be artifactual.  No sites of inflammation are identified in the large or small bowel but the study is significantly limited by the lack of oral or IV contrast and the diffuse infiltration of the mesenteric fat.                               X-Ray Chest AP Portable (Final result)  Result time 06/11/22 09:43:19    Final result by Leigh Ann Harris MD (06/11/22 09:43:19)                 Impression:      Significant interval increase in the size of the cardiac silhouette, cardiomegaly versus pericardial effusion.      Electronically signed by: Leigh Ann Harris  Date:    06/11/2022  Time:    09:43             Narrative:    EXAMINATION:  XR CHEST AP PORTABLE    CLINICAL  HISTORY:  Sepsis;    TECHNIQUE:  Single frontal view of the chest was performed.    COMPARISON:  04/23/2021    FINDINGS:  There is moderate to marked enlargement of the cardiac silhouette which has increased in size significantly compared to the previous study.  There is no mediastinal abnormality.  Lungs remain clear.  No definite pleural effusion.                                 Medications   lactated ringers bolus 2,217 mL (0 mLs Intravenous Stopped 6/11/22 1030)   morphine injection 6 mg (6 mg Intravenous Given 6/11/22 0950)   cefTRIAXone (ROCEPHIN) 1 g/50 mL D5W IVPB (0 g Intravenous Stopped 6/11/22 1030)   labetaloL injection 10 mg (10 mg Intravenous Given 6/11/22 1252)   aluminum-magnesium hydroxide-simethicone 200-200-20 mg/5 mL suspension 30 mL (30 mLs Oral Given 6/11/22 1416)     Medical Decision Making:   History:   Old Medical Records: I decided to obtain old medical records.  Initial Assessment:   32-year-old woman with known CKD, pericardial effusion, pleural effusions, volume overload and hypertension presents emergency department for epigastric pain and left flank pain.  She has had this pain in the past and states is due to when her she becomes volume overloaded.  She has no evidence urinary tract infection or pyelonephritis.  No evidence of obstructive uropathy.  She is given Maalox for suspected gastritis on CT scan and by symptoms with improvement.  She does have anasarca with pericardial effusion but there is no tamponade physiology appreciated on vitals or electrocardiogram.  Her pain is improved in the ED with pain management.  Discussed with Nephrology, Dr. Benitez, who recommends close follow-up and changing her Lasix to torsemide, 40 mg twice a day.  She can follow up with him on Monday or with his nephrologist but needs to have labs drawn this week for re-evaluation.  I have explained this to the patient.  I will also give her short course of pain medication.  She is not having evidence  of hypertensive emergency and diuresis should help with her hypertension and symptoms.  Return precautions discussed.  She is discharged in no acute distress.  Independently Interpreted Test(s):   I have ordered and independently interpreted X-rays - see prior notes.  I have ordered and independently interpreted EKG Reading(s) - see prior notes  Clinical Tests:   Lab Tests: Ordered and Reviewed  Radiological Study: Ordered and Reviewed  Medical Tests: Reviewed and Ordered          Scribe Attestation:   Scribe #1: I performed the above scribed service and the documentation accurately describes the services I performed. I attest to the accuracy of the note.               I, Bruce Lopez, personally performed the services described in this documentation. All medical record entries made by the scribe were at my direction and in my presence.  I have reviewed the chart and agree that the record reflects my personal performance and is accurate and complete. Gurmeet Barrera MD.    Clinical Impression:   Final diagnoses:  [R00.0] Tachycardia  [N18.9] Chronic kidney disease, unspecified CKD stage (Primary)  [R60.1] Anasarca          ED Disposition Condition    Discharge Stable        ED Prescriptions     Medication Sig Dispense Start Date End Date Auth. Provider    torsemide (DEMADEX) 20 MG Tab Take 1 tablet (20 mg total) by mouth 2 (two) times a day. 60 tablet 6/11/2022 6/11/2023 Gurmeet Barrera MD    HYDROcodone-acetaminophen (NORCO)  mg per tablet Take 1 tablet by mouth every 4 (four) hours as needed for Pain. 12 tablet 6/11/2022  Gurmeet Barrera MD        Follow-up Information     Follow up With Specialties Details Why Contact Info    Mando Benitez MD Nephrology On 6/13/2022  [address] [facility]  [place] [address]  [phone]      Your Nephrologist  Schedule an appointment as soon as possible for a visit   as scheduled this week    [facility] Emergency  Medicine  As needed, If symptoms worsen [address] [place] [id number]  [phone]           Gurmeet Barrera MD  06/11/22 1163

## 2022-06-16 LAB
BACTERIA BLD CULT: NORMAL
BACTERIA BLD CULT: NORMAL

## 2022-07-07 ENCOUNTER — HOSPITAL ENCOUNTER (INPATIENT)
Facility: HOSPITAL | Age: 32
LOS: 10 days | Discharge: HOME | DRG: 683 | End: 2022-07-17
Attending: EMERGENCY MEDICINE | Admitting: INTERNAL MEDICINE
Payer: MEDICAID

## 2022-07-07 DIAGNOSIS — N17.9 ACUTE RENAL FAILURE SUPERIMPOSED ON STAGE 4 CHRONIC KIDNEY DISEASE, UNSPECIFIED ACUTE RENAL FAILURE TYPE: ICD-10-CM

## 2022-07-07 DIAGNOSIS — I31.39 PERICARDIAL EFFUSION: ICD-10-CM

## 2022-07-07 DIAGNOSIS — Z45.2 ENCOUNTER FOR CENTRAL LINE PLACEMENT: ICD-10-CM

## 2022-07-07 DIAGNOSIS — N18.6 ESRD (END STAGE RENAL DISEASE): ICD-10-CM

## 2022-07-07 DIAGNOSIS — N18.2 ACUTE RENAL FAILURE WITH ACUTE TUBULAR NECROSIS SUPERIMPOSED ON STAGE 2 CHRONIC KIDNEY DISEASE: ICD-10-CM

## 2022-07-07 DIAGNOSIS — N18.4 ACUTE RENAL FAILURE SUPERIMPOSED ON STAGE 4 CHRONIC KIDNEY DISEASE, UNSPECIFIED ACUTE RENAL FAILURE TYPE: ICD-10-CM

## 2022-07-07 DIAGNOSIS — D64.3: ICD-10-CM

## 2022-07-07 DIAGNOSIS — N17.9 ACUTE RENAL FAILURE SUPERIMPOSED ON CHRONIC KIDNEY DISEASE: ICD-10-CM

## 2022-07-07 DIAGNOSIS — E87.5 HYPERKALEMIA: ICD-10-CM

## 2022-07-07 DIAGNOSIS — N18.9 ACUTE RENAL FAILURE SUPERIMPOSED ON CHRONIC KIDNEY DISEASE: ICD-10-CM

## 2022-07-07 DIAGNOSIS — N17.0 ACUTE RENAL FAILURE WITH ACUTE TUBULAR NECROSIS SUPERIMPOSED ON STAGE 2 CHRONIC KIDNEY DISEASE: ICD-10-CM

## 2022-07-07 DIAGNOSIS — D64.9 ANEMIA, UNSPECIFIED TYPE: Primary | ICD-10-CM

## 2022-07-07 LAB
ABO + RH BLD: NORMAL
ANION GAP SERPL CALC-SCNC: 9 MMOL/L (ref 8–16)
B-HCG UR QL: NEGATIVE
BLD GP AB SCN CELLS X3 SERPL QL: NORMAL
BLD PROD TYP BPU: NORMAL
BLD PROD TYP BPU: NORMAL
BLOOD UNIT EXPIRATION DATE: NORMAL
BLOOD UNIT EXPIRATION DATE: NORMAL
BLOOD UNIT TYPE CODE: 6200
BLOOD UNIT TYPE CODE: 6200
BLOOD UNIT TYPE: NORMAL
BLOOD UNIT TYPE: NORMAL
BNP SERPL-MCNC: 1905 PG/ML (ref 0–99)
BUN SERPL-MCNC: 49 MG/DL (ref 6–20)
CALCIUM SERPL-MCNC: 8.3 MG/DL (ref 8.7–10.5)
CHLORIDE SERPL-SCNC: 117 MMOL/L (ref 95–110)
CO2 SERPL-SCNC: 13 MMOL/L (ref 23–29)
CODING SYSTEM: NORMAL
CODING SYSTEM: NORMAL
CREAT SERPL-MCNC: 4.2 MG/DL (ref 0.5–1.4)
CREAT UR-MCNC: 109.4 MG/DL (ref 15–325)
DISPENSE STATUS: NORMAL
DISPENSE STATUS: NORMAL
EST. GFR  (AFRICAN AMERICAN): 15 ML/MIN/1.73 M^2
EST. GFR  (NON AFRICAN AMERICAN): 13 ML/MIN/1.73 M^2
GLUCOSE SERPL-MCNC: 95 MG/DL (ref 70–110)
LDH SERPL L TO P-CCNC: 446 U/L (ref 110–260)
NUM UNITS TRANS PACKED RBC: NORMAL
NUM UNITS TRANS PACKED RBC: NORMAL
POCT GLUCOSE: 101 MG/DL (ref 70–110)
POTASSIUM SERPL-SCNC: 6.1 MMOL/L (ref 3.5–5.1)
RETICS/RBC NFR AUTO: 5.4 % (ref 0.5–2.5)
SARS-COV-2 RDRP RESP QL NAA+PROBE: NEGATIVE
SODIUM SERPL-SCNC: 139 MMOL/L (ref 136–145)
SODIUM UR-SCNC: 33 MMOL/L (ref 20–250)

## 2022-07-07 PROCEDURE — 25000003 PHARM REV CODE 250: Performed by: PHYSICIAN ASSISTANT

## 2022-07-07 PROCEDURE — 63600175 PHARM REV CODE 636 W HCPCS: Performed by: EMERGENCY MEDICINE

## 2022-07-07 PROCEDURE — 86920 COMPATIBILITY TEST SPIN: CPT | Performed by: EMERGENCY MEDICINE

## 2022-07-07 PROCEDURE — 25000003 PHARM REV CODE 250: Performed by: NURSE PRACTITIONER

## 2022-07-07 PROCEDURE — 96375 TX/PRO/DX INJ NEW DRUG ADDON: CPT | Mod: 59

## 2022-07-07 PROCEDURE — 83010 ASSAY OF HAPTOGLOBIN QUANT: CPT | Performed by: PHYSICIAN ASSISTANT

## 2022-07-07 PROCEDURE — 86706 HEP B SURFACE ANTIBODY: CPT | Performed by: INTERNAL MEDICINE

## 2022-07-07 PROCEDURE — U0002 COVID-19 LAB TEST NON-CDC: HCPCS | Performed by: PHYSICIAN ASSISTANT

## 2022-07-07 PROCEDURE — 51798 US URINE CAPACITY MEASURE: CPT

## 2022-07-07 PROCEDURE — 93010 EKG 12-LEAD: ICD-10-PCS | Mod: ,,, | Performed by: INTERNAL MEDICINE

## 2022-07-07 PROCEDURE — 86704 HEP B CORE ANTIBODY TOTAL: CPT | Performed by: INTERNAL MEDICINE

## 2022-07-07 PROCEDURE — 81025 URINE PREGNANCY TEST: CPT | Performed by: PHYSICIAN ASSISTANT

## 2022-07-07 PROCEDURE — 25000242 PHARM REV CODE 250 ALT 637 W/ HCPCS: Performed by: PHYSICIAN ASSISTANT

## 2022-07-07 PROCEDURE — 20000000 HC ICU ROOM

## 2022-07-07 PROCEDURE — 36415 COLL VENOUS BLD VENIPUNCTURE: CPT | Performed by: PHYSICIAN ASSISTANT

## 2022-07-07 PROCEDURE — 83615 LACTATE (LD) (LDH) ENZYME: CPT | Performed by: PHYSICIAN ASSISTANT

## 2022-07-07 PROCEDURE — 85045 AUTOMATED RETICULOCYTE COUNT: CPT | Performed by: PHYSICIAN ASSISTANT

## 2022-07-07 PROCEDURE — 36556 INSERT NON-TUNNEL CV CATH: CPT

## 2022-07-07 PROCEDURE — 94640 AIRWAY INHALATION TREATMENT: CPT

## 2022-07-07 PROCEDURE — 36415 COLL VENOUS BLD VENIPUNCTURE: CPT | Performed by: INTERNAL MEDICINE

## 2022-07-07 PROCEDURE — 93010 ELECTROCARDIOGRAM REPORT: CPT | Mod: ,,, | Performed by: INTERNAL MEDICINE

## 2022-07-07 PROCEDURE — 80048 BASIC METABOLIC PNL TOTAL CA: CPT | Performed by: PHYSICIAN ASSISTANT

## 2022-07-07 PROCEDURE — P9016 RBC LEUKOCYTES REDUCED: HCPCS | Performed by: EMERGENCY MEDICINE

## 2022-07-07 PROCEDURE — 82570 ASSAY OF URINE CREATININE: CPT | Mod: 91 | Performed by: EMERGENCY MEDICINE

## 2022-07-07 PROCEDURE — 63600175 PHARM REV CODE 636 W HCPCS: Performed by: PHYSICIAN ASSISTANT

## 2022-07-07 PROCEDURE — 96365 THER/PROPH/DIAG IV INF INIT: CPT | Mod: 59

## 2022-07-07 PROCEDURE — 93005 ELECTROCARDIOGRAM TRACING: CPT

## 2022-07-07 PROCEDURE — 99291 CRITICAL CARE FIRST HOUR: CPT | Mod: 25

## 2022-07-07 PROCEDURE — 25000003 PHARM REV CODE 250: Performed by: EMERGENCY MEDICINE

## 2022-07-07 PROCEDURE — 80100014 HC HEMODIALYSIS 1:1

## 2022-07-07 PROCEDURE — 80074 ACUTE HEPATITIS PANEL: CPT | Performed by: INTERNAL MEDICINE

## 2022-07-07 PROCEDURE — 86850 RBC ANTIBODY SCREEN: CPT | Performed by: PHYSICIAN ASSISTANT

## 2022-07-07 PROCEDURE — 25000003 PHARM REV CODE 250: Performed by: INTERNAL MEDICINE

## 2022-07-07 PROCEDURE — 84300 ASSAY OF URINE SODIUM: CPT | Performed by: EMERGENCY MEDICINE

## 2022-07-07 PROCEDURE — 83880 ASSAY OF NATRIURETIC PEPTIDE: CPT | Performed by: PHYSICIAN ASSISTANT

## 2022-07-07 RX ORDER — SODIUM CHLORIDE 9 MG/ML
INJECTION, SOLUTION INTRAVENOUS ONCE
Status: DISCONTINUED | OUTPATIENT
Start: 2022-07-07 | End: 2022-07-07

## 2022-07-07 RX ORDER — OXYCODONE AND ACETAMINOPHEN 5; 325 MG/1; MG/1
1 TABLET ORAL EVERY 8 HOURS PRN
COMMUNITY
Start: 2022-05-25 | End: 2022-07-07

## 2022-07-07 RX ORDER — HYDROCODONE BITARTRATE AND ACETAMINOPHEN 500; 5 MG/1; MG/1
TABLET ORAL
Status: DISCONTINUED | OUTPATIENT
Start: 2022-07-07 | End: 2022-07-15

## 2022-07-07 RX ORDER — MORPHINE SULFATE 2 MG/ML
6 INJECTION, SOLUTION INTRAMUSCULAR; INTRAVENOUS
Status: COMPLETED | OUTPATIENT
Start: 2022-07-07 | End: 2022-07-07

## 2022-07-07 RX ORDER — ERGOCALCIFEROL 1.25 MG/1
50000 CAPSULE ORAL
COMMUNITY
Start: 2022-02-09 | End: 2022-07-07

## 2022-07-07 RX ORDER — INSULIN ASPART 100 [IU]/ML
1-10 INJECTION, SOLUTION INTRAVENOUS; SUBCUTANEOUS
Status: DISCONTINUED | OUTPATIENT
Start: 2022-07-07 | End: 2022-07-17 | Stop reason: HOSPADM

## 2022-07-07 RX ORDER — SODIUM BICARBONATE 1 MEQ/ML
50 SYRINGE (ML) INTRAVENOUS
Status: DISCONTINUED | OUTPATIENT
Start: 2022-07-07 | End: 2022-07-07

## 2022-07-07 RX ORDER — IBUPROFEN 200 MG
24 TABLET ORAL
Status: DISCONTINUED | OUTPATIENT
Start: 2022-07-07 | End: 2022-07-17 | Stop reason: HOSPADM

## 2022-07-07 RX ORDER — HYDRALAZINE HYDROCHLORIDE 10 MG/1
10 TABLET, FILM COATED ORAL EVERY 8 HOURS
COMMUNITY
Start: 2022-01-14 | End: 2022-07-07

## 2022-07-07 RX ORDER — LABETALOL HYDROCHLORIDE 5 MG/ML
10 INJECTION, SOLUTION INTRAVENOUS EVERY 6 HOURS PRN
Status: DISCONTINUED | OUTPATIENT
Start: 2022-07-07 | End: 2022-07-17 | Stop reason: HOSPADM

## 2022-07-07 RX ORDER — LIDOCAINE 50 MG/G
1 PATCH TOPICAL DAILY
COMMUNITY
Start: 2022-02-09 | End: 2022-07-07

## 2022-07-07 RX ORDER — NITROGLYCERIN 20 MG/100ML
0-400 INJECTION INTRAVENOUS CONTINUOUS
Status: DISCONTINUED | OUTPATIENT
Start: 2022-07-07 | End: 2022-07-09

## 2022-07-07 RX ORDER — HYDROCODONE BITARTRATE AND ACETAMINOPHEN 5; 325 MG/1; MG/1
1 TABLET ORAL ONCE
Status: COMPLETED | OUTPATIENT
Start: 2022-07-08 | End: 2022-07-07

## 2022-07-07 RX ORDER — INSULIN ASPART 100 [IU]/ML
INJECTION, SOLUTION INTRAVENOUS; SUBCUTANEOUS
COMMUNITY
Start: 2022-02-27 | End: 2022-07-07

## 2022-07-07 RX ORDER — INSULIN GLARGINE 100 [IU]/ML
5 INJECTION, SOLUTION SUBCUTANEOUS DAILY
Status: ON HOLD | COMMUNITY
Start: 2022-05-25 | End: 2022-11-15 | Stop reason: HOSPADM

## 2022-07-07 RX ORDER — IBUPROFEN 200 MG
16 TABLET ORAL
Status: DISCONTINUED | OUTPATIENT
Start: 2022-07-07 | End: 2022-07-17 | Stop reason: HOSPADM

## 2022-07-07 RX ORDER — CALCIUM GLUCONATE 20 MG/ML
1 INJECTION, SOLUTION INTRAVENOUS
Status: COMPLETED | OUTPATIENT
Start: 2022-07-07 | End: 2022-07-07

## 2022-07-07 RX ORDER — ALBUTEROL SULFATE 2.5 MG/.5ML
10 SOLUTION RESPIRATORY (INHALATION)
Status: COMPLETED | OUTPATIENT
Start: 2022-07-07 | End: 2022-07-07

## 2022-07-07 RX ORDER — GLUCAGON 1 MG
1 KIT INJECTION
Status: DISCONTINUED | OUTPATIENT
Start: 2022-07-07 | End: 2022-07-17 | Stop reason: HOSPADM

## 2022-07-07 RX ORDER — MUPIROCIN 20 MG/G
OINTMENT TOPICAL 2 TIMES DAILY
Status: DISPENSED | OUTPATIENT
Start: 2022-07-07 | End: 2022-07-12

## 2022-07-07 RX ORDER — ESCITALOPRAM OXALATE 5 MG/1
5 TABLET ORAL DAILY
COMMUNITY
Start: 2022-03-11 | End: 2022-07-07

## 2022-07-07 RX ADMIN — LABETALOL HYDROCHLORIDE 10 MG: 5 INJECTION INTRAVENOUS at 07:07

## 2022-07-07 RX ADMIN — DEXTROSE MONOHYDRATE 25 G: 25 INJECTION, SOLUTION INTRAVENOUS at 03:07

## 2022-07-07 RX ADMIN — CALCIUM GLUCONATE 1 G: 20 INJECTION, SOLUTION INTRAVENOUS at 03:07

## 2022-07-07 RX ADMIN — MUPIROCIN: 20 OINTMENT TOPICAL at 08:07

## 2022-07-07 RX ADMIN — HYDROCODONE BITARTRATE AND ACETAMINOPHEN 1 TABLET: 5; 325 TABLET ORAL at 11:07

## 2022-07-07 RX ADMIN — SODIUM ZIRCONIUM CYCLOSILICATE 10 G: 10 POWDER, FOR SUSPENSION ORAL at 03:07

## 2022-07-07 RX ADMIN — MORPHINE SULFATE 6 MG: 2 INJECTION, SOLUTION INTRAMUSCULAR; INTRAVENOUS at 05:07

## 2022-07-07 RX ADMIN — ALBUTEROL SULFATE 10 MG: 2.5 SOLUTION RESPIRATORY (INHALATION) at 02:07

## 2022-07-07 RX ADMIN — INSULIN HUMAN 10 UNITS: 100 INJECTION, SOLUTION PARENTERAL at 03:07

## 2022-07-07 RX ADMIN — NITROGLYCERIN 10 MCG/MIN: 20 INJECTION INTRAVENOUS at 09:07

## 2022-07-07 NOTE — ED PROVIDER NOTES
"Encounter Date: 2022    SCRIBE #1 NOTE: I, Shelbi Islas, am scribing for, and in the presence of, Dr. Grant .       History     Chief Complaint   Patient presents with    Abnormal Lab     Time seen by provider: 2:14 PM on 2022    Tabby Howard is a 32 y.o. female with a PMHx of DM II, supraventricular tachycardia, heart failure with preserved ejection fraction (22), Sickle cell trait, and CKD (stage IV) who presents to the ED with her dad for evaluation of persistent back pain.  Patient recently had lab work done this morning and was informed of abnormal "kidney" results with referral to the ED for further evaluation and work up.  She endorses diffuse back pain over the past month, intermittent chills as well as diarrhea a few days ago that has since improved.  Patient specifies she is able to urinate but notes that the volume has decreased.  She presents shaking and rocking herself repeatedly secondary to pain and expresses leg swelling due to her being "full of fluid."  LMP was in 2021.  The patient denies fever, N/V, abdominal pain on exam, SOB, CP or any other symptoms at this time.  PSHx includes EGD and .      The history is provided by the patient.     Review of patient's allergies indicates:   Allergen Reactions    Penicillins Hives     Past Medical History:   Diagnosis Date    CKD (chronic kidney disease), stage IV 2022    Diabetes mellitus due to underlying condition with unspecified complications 2022    Gastroparesis 2022    Heart failure with preserved ejection fraction 2022    EF 55% on 3/22    History of gastroesophageal reflux (GERD)     History of supraventricular tachycardia     Sickle cell trait 2022    Type 2 diabetes mellitus      Past Surgical History:   Procedure Laterality Date     SECTION      x 3    ESOPHAGOGASTRODUODENOSCOPY N/A 10/18/2019    Procedure: ESOPHAGOGASTRODUODENOSCOPY (EGD);  Surgeon: Gianluca SOLIS" MD Vanessa;  Location: [facility];  Service: Endoscopy;  Laterality: N/A;     Family History   Problem Relation Age of Onset    Diabetes Mother     Diabetes Father      Social History     Tobacco Use    Smoking status: Never Smoker    Smokeless tobacco: Never Used   Substance Use Topics    Alcohol use: No    Drug use: No     Review of Systems   Constitutional: Positive for chills. Negative for fever.   HENT: Negative for facial swelling and trouble swallowing.    Eyes: Negative for discharge.   Respiratory: Negative for cough, chest tightness, shortness of breath and wheezing.    Cardiovascular: Positive for leg swelling. Negative for chest pain and palpitations.   Gastrointestinal: Positive for diarrhea. Negative for abdominal pain, nausea and vomiting.   Genitourinary: Positive for decreased urine volume. Negative for difficulty urinating, dysuria and hematuria.   Musculoskeletal: Positive for back pain. Negative for arthralgias, joint swelling, myalgias, neck pain and neck stiffness.   Skin: Negative for color change, pallor, rash and wound.   Neurological: Negative for dizziness, syncope, weakness, light-headedness, numbness and headaches.   Hematological: Does not bruise/bleed easily.   Psychiatric/Behavioral: The patient is not nervous/anxious.    All other systems reviewed and are negative.      Physical Exam     Initial Vitals [07/07/22 1326]   BP Pulse Resp Temp SpO2   (!) 179/114 107 (!) 22 98.3 °F (36.8 °C) 97 %      MAP       --         Physical Exam    Nursing note and vitals reviewed.  Constitutional: She appears well-developed and well-nourished. She is not diaphoretic. No distress.   HENT:   Head: Normocephalic and atraumatic.   Right Ear: External ear normal.   Left Ear: External ear normal.   Nose: Nose normal.   Facial edema noted on exam.    Eyes: Conjunctivae and EOM are normal. Pupils are equal, round, and reactive to light.   Neck: Neck supple.   Normal range of motion.  Cardiovascular:  Regular rhythm and intact distal pulses. Tachycardia present.  Exam reveals gallop, S3 and S4.    Pulses:       Radial pulses are 2+ on the right side and 2+ on the left side.        Dorsalis pedis pulses are 2+ on the right side and 2+ on the left side.        Posterior tibial pulses are 2+ on the right side and 2+ on the left side.   Pulmonary/Chest: Breath sounds normal. No respiratory distress. She has no wheezes. She has no rhonchi. She has no rales.   Musculoskeletal:         General: No tenderness. Normal range of motion.      Cervical back: Normal range of motion and neck supple.      Right lower leg: 3+ Pitting Edema present.      Left lower leg: 3+ Pitting Edema present.      Comments: Generalized anasarca.      Neurological: She is alert and oriented to person, place, and time. She has normal strength. No cranial nerve deficit or sensory deficit. GCS score is 15. GCS eye subscore is 4. GCS verbal subscore is 5. GCS motor subscore is 6.   Skin: Skin is warm and dry. No rash and no abscess noted. No erythema.   Psychiatric: She has a normal mood and affect.         ED Course   Critical Care    Date/Time: 7/7/2022 3:03 PM  Performed by: Nura Grant MD  Authorized by: Nura Grant MD   Total critical care time (exclusive of procedural time) : 50 minutes  Critical care time was exclusive of separately billable procedures and treating other patients and teaching time.  Critical care was necessary to treat or prevent imminent or life-threatening deterioration of the following conditions: renal failure.  Critical care was time spent personally by me on the following activities: blood draw for specimens, development of treatment plan with patient or surrogate, discussions with consultants, interpretation of cardiac output measurements, evaluation of patient's response to treatment, examination of patient, obtaining history from patient or surrogate, ordering and performing treatments and interventions,  ordering and review of laboratory studies, ordering and review of radiographic studies, pulse oximetry, re-evaluation of patient's condition and review of old charts.    Central Line    Date/Time: 7/7/2022 3:35 PM  Performed by: Nura Grant MD  Authorized by: Nura Grant MD     Location procedure was performed:  [facility] EMERGENCY DEPARTMENT  Consent Done ?:  Yes  Time out complete?: Verified correct patient, procedure, equipment, staff, and site/side    Indications:  Hemodialysis  Anesthesia:  Local infiltration  Local anesthetic:  Lidocaine 1% without epinephrine  Anesthetic total (ml):  3  Preparation:  Skin prepped with ChloraPrep  Skin prep agent dried: Skin prep agent completely dried prior to procedure    Sterile barriers: All five maximal sterile barriers used - gloves, gown, cap, mask and large sterile sheet    Hand hygiene: Hand hygiene performed immediately prior to central venous catheter insertion    Location:  Right internal jugular  Catheter type:  Trialysis  Catheter size:  12 Fr  Ultrasound guidance: Yes    Vessel Caliber:  Large   patent  Comprressibility:  Normal  Needle advanced into vessel with real time ultrasound guidance.    Guidewire confirmed in vessel.    Steril sheath on probe.    Sterile gel used.  Manometry: No    Number of attempts:  1  Securement:  Line sutured and blood return through all ports  Complications: No    Specimens: No    XRay:  Placement verified by x-ray and successful placement  Adverse Events:  NoneTermination Site: superior vena cava      Labs Reviewed   LACTATE DEHYDROGENASE - Abnormal; Notable for the following components:       Result Value     (*)     All other components within normal limits   B-TYPE NATRIURETIC PEPTIDE - Abnormal; Notable for the following components:    BNP 1,905 (*)     All other components within normal limits   BASIC METABOLIC PANEL - Abnormal; Notable for the following components:    Potassium 6.1 (*)     Chloride 117 (*)      CO2 13 (*)     BUN 49 (*)     Creatinine 4.2 (*)     Calcium 8.3 (*)     eGFR if  15 (*)     eGFR if non  13 (*)     All other components within normal limits   RETICULOCYTES - Abnormal; Notable for the following components:    Retic 5.4 (*)     All other components within normal limits   SARS-COV-2 RNA AMPLIFICATION, QUAL   PREGNANCY TEST, URINE RAPID    Narrative:     Specimen Source->Urine   SODIUM, URINE, RANDOM    Narrative:     Specimen Source->Urine   CREATININE, URINE, RANDOM    Narrative:     Specimen Source->Urine   HAPTOGLOBIN   HEPATITIS PANEL, ACUTE   HEPATITIS B SURFACE ANTIBODY   HEPATITIS B CORE ANTIBODY, TOTAL   TYPE & SCREEN   POCT GLUCOSE   POCT GLUCOSE MONITORING CONTINUOUS   PREPARE RBC SOFT          Imaging Results          X-Ray Chest AP Portable (Final result)  Result time 07/07/22 16:51:46    Final result by Gianluca Arriaga Jr., MD (07/07/22 16:51:46)                 Impression:      Double lumen central line placed in good position without pneumothorax.  Continued cardiomegaly and mild bibasilar infiltrates.      Electronically signed by: Gianluca Arriaga MD  Date:    07/07/2022  Time:    16:51             Narrative:    EXAMINATION:  XR CHEST AP PORTABLE    CLINICAL HISTORY:  Encounter for adjustment and management of vascular access device    TECHNIQUE:  Single frontal view of the chest was performed.    COMPARISON:  Chest of July 7, 2022    FINDINGS:  A double lumen central line has been placed entering at the right neck and ends at the distal superior vena cava.  There is continued moderate cardiomegaly and faint infiltrates in the lung bases.  No pneumothorax is seen.                               X-Ray Chest AP Portable (Final result)  Result time 07/07/22 15:10:35    Final result by Gianluca Arriaga Jr., MD (07/07/22 15:10:35)                 Impression:      Moderate cardiomegaly.  Mild infiltrate at both lung bases right greater than left with  small right pleural effusion.  This may represent congestive heart failure      Electronically signed by: Gianluca Arriaga MD  Date:    07/07/2022  Time:    15:10             Narrative:    EXAMINATION:  XR CHEST AP PORTABLE    CLINICAL HISTORY:  Hyperkalemia    TECHNIQUE:  Single frontal view of the chest was performed.    COMPARISON:  Portable chest of June 11, 2022    FINDINGS:  There is moderate cardiomegaly in a biventricular pattern.  There is faint infiltrate identified at the right lung base and probable left lung base suggesting pulmonary edema.  No pneumothorax is seen.  A trace right pleural effusion is noted                                 Medications   dextrose 10% bolus 250 mL (has no administration in time range)   0.9%  NaCl infusion (for blood administration) (has no administration in time range)   mupirocin 2 % ointment (has no administration in time range)   glucose chewable tablet 16 g (has no administration in time range)   glucose chewable tablet 24 g (has no administration in time range)   glucagon (human recombinant) injection 1 mg (has no administration in time range)   dextrose 10% bolus 125 mL (has no administration in time range)   dextrose 10% bolus 250 mL (has no administration in time range)   insulin aspart U-100 pen 1-10 Units (has no administration in time range)   albuterol sulfate nebulizer solution 10 mg (10 mg Nebulization Given 7/7/22 1455)   dextrose 50% injection 25 g (25 g Intravenous Given 7/7/22 1532)   insulin regular injection 10 Units 0.1 mL (10 Units Intravenous Given 7/7/22 1531)   calcium gluconate 1 g in NS IVPB (premixed) (0 g Intravenous Stopped 7/7/22 1635)   sodium zirconium cyclosilicate packet 10 g (10 g Oral Given 7/7/22 1536)   morphine injection 6 mg (6 mg Intravenous Given 7/7/22 1701)     Medical Decision Making:   History:   Old Medical Records: I decided to obtain old medical records.  Independently Interpreted Test(s):   I have ordered and independently  interpreted X-rays - see prior notes.  I have ordered and independently interpreted EKG Reading(s) - see prior notes  Clinical Tests:   Lab Tests: Ordered and Reviewed  Radiological Study: Ordered and Reviewed  Medical Tests: Ordered and Reviewed          Scribe Attestation:   Scribe #1: I performed the above scribed service and the documentation accurately describes the services I performed. I attest to the accuracy of the note.        ED Course as of 07/07/22 1740   Thu Jul 07, 2022   1447 Patient appears to have generalized anasarca with worsening renal function in the setting of hyperkalemia and anemia.  She definitely needs admission to the hospital and likely even needs dialysis.  She will need a blood transfusion.  I will discuss the case with [JS]   1510 Chest x-ray independently interpreted by me shows significant cardiomegaly.  Patient has a pericardial effusion by bedside ultrasound. [JS]   1532 EKG independently interpreted by me as rate 115 sinus tachycardia low-voltage QRS no signs of electrical alternans poor R-wave progression no ST segment elevation or depression. [JS]      ED Course User Index  [JS] Nura Grant MD           I, Nura Grant MD, personally performed the services described in this documentation. All medical record entries made by the scribe were at my direction and in my presence.  I have reviewed the chart and agree that the record reflects my personal performance and is accurate and complete.  3:18 PM 07/07/2022      Clinical Impression:   Final diagnoses:  [E87.5] Hyperkalemia  [E87.5] Hyperkalemia  [Z45.2] Encounter for central line placement          ED Disposition Condition    Admit               Thelma Mata PA-C  07/07/22 1740

## 2022-07-07 NOTE — ASSESSMENT & PLAN NOTE
Patient's FSGs are controlled on current medication regimen.  Last A1c reviewed-   Lab Results   Component Value Date    HGBA1C 5.8 (H) 05/15/2022     Most recent fingerstick glucose reviewed- No results for input(s): POCTGLUCOSE in the last 24 hours.  Current correctional scale  Medium  Maintain anti-hyperglycemic dose as follows-   Antihyperglycemics (From admission, onward)            Start     Stop Route Frequency Ordered    07/07/22 1719  insulin aspart U-100 pen 1-10 Units         -- SubQ Before meals & nightly PRN 07/07/22 1619        Hold Oral hypoglycemics while patient is in the hospital.

## 2022-07-07 NOTE — ASSESSMENT & PLAN NOTE
Pt worsening of underlying CKD 4   Scheduled for hemodialysis New pt  Dialysis access will be placed  Continue IVF hydration.  Follow renal panel and electrolytes closely.  Follow renal recommendations.  CT r/o renal stone  Adjust renal dose medications for creatinine clearance 10-50cc/min.  Avoid NSAIDs, De Leon-II inhibitors, ACE-I, Angiotensin Receptor Blockers, or Aminoglycosides.  Urine analysis.  Will defer following workup to nephro   JUAN A, C3, C4, ESR, UPEP and SPEP  Check Urine Eosinophils.  Check  Urine Na, Cr, Protein.

## 2022-07-07 NOTE — ASSESSMENT & PLAN NOTE
Hyperkalemia  - Upon presentation K   - EKG without T wave changes  - Given insulin & D50, & Trent-Nebs  - Q1hr glucose & 3hr BMP checks for the next 4 hours  low K+ diet, < 2 g per day  - give Lokelma 10 g po q8h x 48 hours then once daily    - give calcium gluconate 1 g iv [id number] if EKG changes

## 2022-07-07 NOTE — PHARMACY MED REC
"Admission Medication History     The home medication history was taken by Evie Diaz CPhT.    Medication history obtained from [facility]     You may go to "Admission" then "Reconcile Home Medications" tabs to review and/or act upon these items.      The home medication list has been updated by the Pharmacy department.    Please read ALL comments highlighted in yellow.    Please address this information as you see fit.     Feel free to contact us if you have any questions or require assistance.      Evie Diaz CPhT.  [phone]      .          "

## 2022-07-07 NOTE — ED TRIAGE NOTES
"32 y.o. female referred to ED for abnormal "kidney labs". Patient is unsure which doctor order the blood work. Patient endorses generalized back pain x 1 month. Patient denies n/v endorses diarrhea a few days ago that has resolved. Patient denies fever/chills, denies chest pain/ shortness of breath.  "

## 2022-07-07 NOTE — ASSESSMENT & PLAN NOTE
Patient's anemia is currently uncontrolled. Has not received any PRBCs to date.. Etiology likely d/t Anemia of acute illness  Will need to do stool guaic retic count   Will transfuse 2 units during HD  Current CBC reviewed-   Lab Results   Component Value Date    HGB 4.9 (LL) 07/07/2022    HCT 14.6 (LL) 07/07/2022     Monitor serial CBC and transfuse if patient becomes hemodynamically unstable, symptomatic or H/H drops below 7/21.

## 2022-07-07 NOTE — SUBJECTIVE & OBJECTIVE
Past Medical History:   Diagnosis Date    CKD (chronic kidney disease), stage IV 2022    Diabetes mellitus due to underlying condition with unspecified complications 2022    Gastroparesis 2022    Heart failure with preserved ejection fraction 2022    EF 55% on 3/22    History of gastroesophageal reflux (GERD)     History of supraventricular tachycardia     Sickle cell trait 2022    Type 2 diabetes mellitus        Past Surgical History:   Procedure Laterality Date     SECTION      x 3    ESOPHAGOGASTRODUODENOSCOPY N/A 10/18/2019    Procedure: ESOPHAGOGASTRODUODENOSCOPY (EGD);  Surgeon: Gianluca Mendez MD;  Location: [facility];  Service: Endoscopy;  Laterality: N/A;       Review of patient's allergies indicates:   Allergen Reactions    Penicillins Hives       No current facility-administered medications on file prior to encounter.     Current Outpatient Medications on File Prior to Encounter   Medication Sig    FLUoxetine 20 MG capsule Take 1 capsule (20 mg total) by mouth once daily.    isosorbide mononitrate (IMDUR) 60 MG 24 hr tablet Take 1 tablet (60 mg total) by mouth once daily.    LANTUS U-100 INSULIN 100 unit/mL injection SMARTSI Unit(s) SUB-Q Every Morning    levETIRAcetam (KEPPRA) 500 MG Tab Take 1 tablet (500 mg total) by mouth 2 (two) times daily.    torsemide (DEMADEX) 20 MG Tab Take 1 tablet (20 mg total) by mouth 2 (two) times a day. (Patient taking differently: Take 20 mg by mouth once daily.)    [DISCONTINUED] acetaminophen (TYLENOL) 325 MG tablet Take 2 tablets (650 mg total) by mouth every 6 (six) hours as needed for Pain or Temperature greater than (100.3).    [DISCONTINUED] blood-glucose meter kit Use as instructed    [DISCONTINUED] ergocalciferol (ERGOCALCIFEROL) 50,000 unit Cap Take 50,000 Units by mouth every 7 days.    [DISCONTINUED] EScitalopram oxalate (LEXAPRO) 5 MG Tab Take 5 mg by mouth once daily.    [DISCONTINUED] furosemide (LASIX) 40 MG tablet Take  1 tablet (40 mg total) by mouth 2 (two) times daily.    [DISCONTINUED] hydrALAZINE (APRESOLINE) 10 MG tablet Take 10 mg by mouth every 8 (eight) hours.    [DISCONTINUED] HYDROcodone-acetaminophen (NORCO)  mg per tablet Take 1 tablet by mouth every 4 (four) hours as needed for Pain.    [DISCONTINUED] insulin (BASAGLAR KWIKPEN U-100 INSULIN) glargine 100 units/mL (3mL) SubQ pen Inject 10 Units into the skin every evening.    [DISCONTINUED] insulin aspart U-100 (NOVOLOG) 100 unit/mL (3 mL) InPn pen SMARTSIG:15 Unit(s) SUB-Q 3 Times Daily    [DISCONTINUED] insulin glulisine U-100 (APIDRA U-100 INSULIN) 100 unit/mL injection Inject 8 Units into the skin 3 (three) times daily before meals.    [DISCONTINUED] LIDOcaine (LIDODERM) 5 % 1 patch once daily.    [DISCONTINUED] oxyCODONE-acetaminophen (PERCOCET) 5-325 mg per tablet Take 1 tablet by mouth every 8 (eight) hours as needed.    [DISCONTINUED] pantoprazole (PROTONIX) 40 MG tablet Take 1 tablet (40 mg total) by mouth once daily.     Family History       Problem Relation (Age of Onset)    Diabetes Mother, Father          Tobacco Use    Smoking status: Never Smoker    Smokeless tobacco: Never Used   Substance and Sexual Activity    Alcohol use: No    Drug use: No    Sexual activity: Not Currently     Partners: Male     Birth control/protection: I.U.D.     Review of Systems   Unable to perform ROS: Acuity of condition   Constitutional:  Positive for chills and fatigue.   HENT:  Negative for sore throat.    Respiratory:  Positive for shortness of breath.    Gastrointestinal:  Negative for abdominal pain.   Genitourinary:  Negative for decreased urine volume.   Musculoskeletal:  Positive for back pain.   Skin:  Positive for pallor.   Allergic/Immunologic: Positive for immunocompromised state.   Neurological:  Positive for weakness. Negative for seizures and headaches.   Psychiatric/Behavioral:  Negative for behavioral problems.    Objective:     Vital Signs (Most  Recent):  Temp: 98.3 °F (36.8 °C) (07/07/22 1326)  Pulse: 109 (07/07/22 1501)  Resp: 20 (07/07/22 1501)  BP: (!) 189/118 (07/07/22 1501)  SpO2: 95 % (07/07/22 1501)   Vital Signs (24h Range):  Temp:  [98.3 °F (36.8 °C)] 98.3 °F (36.8 °C)  Pulse:  [107-111] 109  Resp:  [20-22] 20  SpO2:  [95 %-97 %] 95 %  BP: (179-189)/(114-118) 189/118     Weight: 72.6 kg (160 lb)  Body mass index is 29.26 kg/m².    Physical Exam  Vitals and nursing note reviewed.   Constitutional:       General: She is not in acute distress.     Appearance: She is well-developed. She is ill-appearing. She is not diaphoretic.      Comments: Anasarca.   HENT:      Head: Normocephalic and atraumatic.      Right Ear: External ear normal.      Left Ear: External ear normal.      Nose: Nose normal.      Mouth/Throat:      Mouth: Mucous membranes are moist.   Eyes:      General: No scleral icterus.     Extraocular Movements: Extraocular movements intact.      Conjunctiva/sclera: Conjunctivae normal.      Pupils: Pupils are equal, round, and reactive to light.   Neck:      Vascular: No JVD.   Cardiovascular:      Rate and Rhythm: Normal rate and regular rhythm.      Pulses: Normal pulses.      Heart sounds: Normal heart sounds. No murmur heard.    No friction rub. No gallop.   Pulmonary:      Effort: Pulmonary effort is normal. No respiratory distress.      Breath sounds: Normal breath sounds. No wheezing or rales.   Abdominal:      General: Bowel sounds are normal. There is no distension.      Palpations: Abdomen is soft.      Tenderness: There is no abdominal tenderness. There is no guarding or rebound.   Genitourinary:     Comments: Pelayo.  Musculoskeletal:         General: No tenderness. Normal range of motion.      Cervical back: Neck supple.      Right lower leg: Edema present.      Left lower leg: Edema present.   Skin:     General: Skin is warm and dry.      Coloration: Skin is not pale.      Findings: No erythema or rash.   Neurological:       Mental Status: She is oriented to person, place, and time.      Cranial Nerves: No cranial nerve deficit.      Motor: No weakness.   Psychiatric:         Mood and Affect: Mood normal.         Behavior: Behavior normal.         CRANIAL NERVES     CN III, IV, VI   Pupils are equal, round, and reactive to light.     Significant Labs: All pertinent labs within the past 24 hours have been reviewed.  CBC:   Recent Labs   Lab 07/07/22  0853   WBC 4.59   HGB 4.9*   HCT 14.6*   PLT 95*     CMP:   Recent Labs   Lab 07/07/22  0853 07/07/22  1451    139   K 6.4* 6.1*   * 117*   CO2 15* 13*   GLU 61* 95   BUN 49* 49*   CREATININE 4.2* 4.2*   CALCIUM 8.2* 8.3*   ALBUMIN 2.1*  --    ANIONGAP 6* 9   EGFRNONAA 13* 13*       Significant Imaging: I have reviewed all pertinent imaging results/findings within the past 24 hours.

## 2022-07-07 NOTE — H&P
"[facility] Medicine  History & Physical    Patient Name: Tabby Howard  MRN: [id number]  Patient Class: IP- Inpatient  Admission Date: 2022  Attending Physician: Nura Grant MD   Primary Care Provider: Primary Doctor No         Patient information was obtained from patient and ER records.     Subjective:     Principal Problem:Acute renal failure superimposed on chronic kidney disease    Chief Complaint:   Chief Complaint   Patient presents with    Abnormal Lab        HPI: Tabby Howard is a 32 y.o. female with a PMHx of DM II, supraventricular tachycardia, heart failure with preserved ejection fraction (22), Sickle cell trait, and CKD (stage IV) who states that she came to the ED because of abnormal labs. She has been having shortness of breathness, feeling lethargic and generalized weakness for few weeks but has been feeling worse.  Patient had lab work done this morning and was informed of abnormal "kidney" results with referral to the ED for further evaluation and work up.  She endorses diffuse back pain over the past month, intermittent chills as well as diarrhea a few days ago that has since improved.  Patient specifies she is able to urinate but notes that the volume has decreased.  Also states that her  leg swelling has been getting worse due to her being "full of fluid."  LMP was in 2021.  The patient denies fever, N/V, abdominal pain on exam, CP or any other symptoms at this time.  PSHx includes EGD and .  Does not see a nephrologist for her CKD     The history is provided by the patient.          Past Medical History:   Diagnosis Date    CKD (chronic kidney disease), stage IV 2022    Diabetes mellitus due to underlying condition with unspecified complications 2022    Gastroparesis 2022    Heart failure with preserved ejection fraction 2022    EF 55% on 3/22    History of gastroesophageal reflux (GERD)     History of " supraventricular tachycardia     Sickle cell trait 2022    Type 2 diabetes mellitus        Past Surgical History:   Procedure Laterality Date     SECTION      x 3    ESOPHAGOGASTRODUODENOSCOPY N/A 10/18/2019    Procedure: ESOPHAGOGASTRODUODENOSCOPY (EGD);  Surgeon: Gianluca Mendez MD;  Location: [facility];  Service: Endoscopy;  Laterality: N/A;       Review of patient's allergies indicates:   Allergen Reactions    Penicillins Hives       No current facility-administered medications on file prior to encounter.     Current Outpatient Medications on File Prior to Encounter   Medication Sig    FLUoxetine 20 MG capsule Take 1 capsule (20 mg total) by mouth once daily.    isosorbide mononitrate (IMDUR) 60 MG 24 hr tablet Take 1 tablet (60 mg total) by mouth once daily.    LANTUS U-100 INSULIN 100 unit/mL injection SMARTSI Unit(s) SUB-Q Every Morning    levETIRAcetam (KEPPRA) 500 MG Tab Take 1 tablet (500 mg total) by mouth 2 (two) times daily.    torsemide (DEMADEX) 20 MG Tab Take 1 tablet (20 mg total) by mouth 2 (two) times a day. (Patient taking differently: Take 20 mg by mouth once daily.)    [DISCONTINUED] acetaminophen (TYLENOL) 325 MG tablet Take 2 tablets (650 mg total) by mouth every 6 (six) hours as needed for Pain or Temperature greater than (100.3).    [DISCONTINUED] blood-glucose meter kit Use as instructed    [DISCONTINUED] ergocalciferol (ERGOCALCIFEROL) 50,000 unit Cap Take 50,000 Units by mouth every 7 days.    [DISCONTINUED] EScitalopram oxalate (LEXAPRO) 5 MG Tab Take 5 mg by mouth once daily.    [DISCONTINUED] furosemide (LASIX) 40 MG tablet Take 1 tablet (40 mg total) by mouth 2 (two) times daily.    [DISCONTINUED] hydrALAZINE (APRESOLINE) 10 MG tablet Take 10 mg by mouth every 8 (eight) hours.    [DISCONTINUED] HYDROcodone-acetaminophen (NORCO)  mg per tablet Take 1 tablet by mouth every 4 (four) hours as needed for Pain.    [DISCONTINUED] insulin (BASAGLAR  KWIKPEN U-100 INSULIN) glargine 100 units/mL (3mL) SubQ pen Inject 10 Units into the skin every evening.    [DISCONTINUED] insulin aspart U-100 (NOVOLOG) 100 unit/mL (3 mL) InPn pen SMARTSIG:15 Unit(s) SUB-Q 3 Times Daily    [DISCONTINUED] insulin glulisine U-100 (APIDRA U-100 INSULIN) 100 unit/mL injection Inject 8 Units into the skin 3 (three) times daily before meals.    [DISCONTINUED] LIDOcaine (LIDODERM) 5 % 1 patch once daily.    [DISCONTINUED] oxyCODONE-acetaminophen (PERCOCET) 5-325 mg per tablet Take 1 tablet by mouth every 8 (eight) hours as needed.    [DISCONTINUED] pantoprazole (PROTONIX) 40 MG tablet Take 1 tablet (40 mg total) by mouth once daily.     Family History       Problem Relation (Age of Onset)    Diabetes Mother, Father          Tobacco Use    Smoking status: Never Smoker    Smokeless tobacco: Never Used   Substance and Sexual Activity    Alcohol use: No    Drug use: No    Sexual activity: Not Currently     Partners: Male     Birth control/protection: I.U.D.     Review of Systems   Unable to perform ROS: Acuity of condition   Constitutional:  Positive for chills and fatigue.   HENT:  Negative for sore throat.    Respiratory:  Positive for shortness of breath.    Gastrointestinal:  Negative for abdominal pain.   Genitourinary:  Negative for decreased urine volume.   Musculoskeletal:  Positive for back pain.   Skin:  Positive for pallor.   Allergic/Immunologic: Positive for immunocompromised state.   Neurological:  Positive for weakness. Negative for seizures and headaches.   Psychiatric/Behavioral:  Negative for behavioral problems.    Objective:     Vital Signs (Most Recent):  Temp: 98.3 °F (36.8 °C) (07/07/22 1326)  Pulse: 109 (07/07/22 1501)  Resp: 20 (07/07/22 1501)  BP: (!) 189/118 (07/07/22 1501)  SpO2: 95 % (07/07/22 1501)   Vital Signs (24h Range):  Temp:  [98.3 °F (36.8 °C)] 98.3 °F (36.8 °C)  Pulse:  [107-111] 109  Resp:  [20-22] 20  SpO2:  [95 %-97 %] 95 %  BP:  (179-189)/(114-118) 189/118     Weight: 72.6 kg (160 lb)  Body mass index is 29.26 kg/m².    Physical Exam  Vitals and nursing note reviewed.   Constitutional:       General: She is not in acute distress.     Appearance: She is well-developed. She is ill-appearing. She is not diaphoretic.      Comments: Anasarca.   HENT:      Head: Normocephalic and atraumatic.      Right Ear: External ear normal.      Left Ear: External ear normal.      Nose: Nose normal.      Mouth/Throat:      Mouth: Mucous membranes are moist.   Eyes:      General: No scleral icterus.     Extraocular Movements: Extraocular movements intact.      Conjunctiva/sclera: Conjunctivae normal.      Pupils: Pupils are equal, round, and reactive to light.   Neck:      Vascular: No JVD.   Cardiovascular:      Rate and Rhythm: Normal rate and regular rhythm.      Pulses: Normal pulses.      Heart sounds: Normal heart sounds. No murmur heard.    No friction rub. No gallop.   Pulmonary:      Effort: Pulmonary effort is normal. No respiratory distress.      Breath sounds: Normal breath sounds. No wheezing or rales.   Abdominal:      General: Bowel sounds are normal. There is no distension.      Palpations: Abdomen is soft.      Tenderness: There is no abdominal tenderness. There is no guarding or rebound.   Genitourinary:     Comments: Pelayo.  Musculoskeletal:         General: No tenderness. Normal range of motion.      Cervical back: Neck supple.      Right lower leg: Edema present.      Left lower leg: Edema present.   Skin:     General: Skin is warm and dry.      Coloration: Skin is not pale.      Findings: No erythema or rash.   Neurological:      Mental Status: She is oriented to person, place, and time.      Cranial Nerves: No cranial nerve deficit.      Motor: No weakness.   Psychiatric:         Mood and Affect: Mood normal.         Behavior: Behavior normal.         CRANIAL NERVES     CN III, IV, VI   Pupils are equal, round, and reactive to light.      Significant Labs: All pertinent labs within the past 24 hours have been reviewed.  CBC:   Recent Labs   Lab 07/07/22  0853   WBC 4.59   HGB 4.9*   HCT 14.6*   PLT 95*     CMP:   Recent Labs   Lab 07/07/22  0853 07/07/22  1451    139   K 6.4* 6.1*   * 117*   CO2 15* 13*   GLU 61* 95   BUN 49* 49*   CREATININE 4.2* 4.2*   CALCIUM 8.2* 8.3*   ALBUMIN 2.1*  --    ANIONGAP 6* 9   EGFRNONAA 13* 13*       Significant Imaging: I have reviewed all pertinent imaging results/findings within the past 24 hours.    Assessment/Plan:     * Acute renal failure superimposed on chronic kidney disease  Pt worsening of underlying CKD 4   Scheduled for hemodialysis New pt  Dialysis access will be placed  Continue IVF hydration.  Follow renal panel and electrolytes closely.  Follow renal recommendations.  CT r/o renal stone  Adjust renal dose medications for creatinine clearance 10-50cc/min.  Avoid NSAIDs, De Leon-II inhibitors, ACE-I, Angiotensin Receptor Blockers, or Aminoglycosides.  Urine analysis.  Will defer following workup to nephro   JUAN A, C3, C4, ESR, UPEP and SPEP  Check Urine Eosinophils.  Check  Urine Na, Cr, Protein.                Hyperkalemia  Hyperkalemia  - Upon presentation K   - EKG without T wave changes  - Given insulin & D50, & Trent-Nebs  - Q1hr glucose & 3hr BMP checks for the next 4 hours  low K+ diet, < 2 g per day  - give Lokelma 10 g po q8h x 48 hours then once daily    - give calcium gluconate 1 g iv [id number] if EKG changes          Anemia  Patient's anemia is currently uncontrolled. Has not received any PRBCs to date.. Etiology likely d/t Anemia of acute illness  Will need to do stool guaic retic count   Will transfuse 2 units during HD  Current CBC reviewed-   Lab Results   Component Value Date    HGB 4.9 (LL) 07/07/2022    HCT 14.6 (LL) 07/07/2022     Monitor serial CBC and transfuse if patient becomes hemodynamically unstable, symptomatic or H/H drops below 7/21.         Seizure  Hx  noted      Sickle cell trait  Hx noted        CKD (chronic kidney disease), stage IV  Hx noted      Anemia of chronic kidney failure  Hx noted  Will receive 2 units during HD will recheck Hnh after the 2 units      Type II diabetes mellitus     Patient's FSGs are controlled on current medication regimen.  Last A1c reviewed-   Lab Results   Component Value Date    HGBA1C 5.8 (H) 05/15/2022     Most recent fingerstick glucose reviewed- No results for input(s): POCTGLUCOSE in the last 24 hours.  Current correctional scale  Medium  Maintain anti-hyperglycemic dose as follows-   Antihyperglycemics (From admission, onward)            Start     Stop Route Frequency Ordered    07/07/22 1719  insulin aspart U-100 pen 1-10 Units         -- SubQ Before meals & nightly PRN 07/07/22 1619        Hold Oral hypoglycemics while patient is in the hospital.          VTE Risk Mitigation (From admission, onward)    None             Keegan Cody MD  Department of Hospital Medicine   [facility] - Emergency Dept

## 2022-07-07 NOTE — HPI
"Tabby Howard is a 32 y.o. female with a PMHx of DM II, supraventricular tachycardia, heart failure with preserved ejection fraction (22), Sickle cell trait, and CKD (stage IV) who states that she came to the ED because of abnormal labs. She has been having shortness of breathness, feeling lethargic and generalized weakness for few weeks but has been feeling worse.  Patient had lab work done this morning and was informed of abnormal "kidney" results with referral to the ED for further evaluation and work up.  She endorses diffuse back pain over the past month, intermittent chills as well as diarrhea a few days ago that has since improved.  Patient specifies she is able to urinate but notes that the volume has decreased.  Also states that her  leg swelling has been getting worse due to her being "full of fluid."  LMP was in 2021.  The patient denies fever, N/V, abdominal pain on exam, CP or any other symptoms at this time.  PSHx includes EGD and .  Does not see a nephrologist for her CKD     The history is provided by the patient.      "

## 2022-07-08 PROBLEM — E87.5 HYPERKALEMIA: Status: RESOLVED | Noted: 2022-07-07 | Resolved: 2022-07-08

## 2022-07-08 PROBLEM — I16.1 HYPERTENSIVE EMERGENCY: Status: ACTIVE | Noted: 2022-07-08

## 2022-07-08 LAB
ALBUMIN SERPL BCP-MCNC: 2 G/DL (ref 3.5–5.2)
ALBUMIN SERPL BCP-MCNC: 2 G/DL (ref 3.5–5.2)
ALP SERPL-CCNC: 76 U/L (ref 55–135)
ALP SERPL-CCNC: 86 U/L (ref 55–135)
ALT SERPL W/O P-5'-P-CCNC: 15 U/L (ref 10–44)
ALT SERPL W/O P-5'-P-CCNC: 16 U/L (ref 10–44)
ANION GAP SERPL CALC-SCNC: 7 MMOL/L (ref 8–16)
AST SERPL-CCNC: 17 U/L (ref 10–40)
AST SERPL-CCNC: 17 U/L (ref 10–40)
BASOPHILS # BLD AUTO: 0.01 K/UL (ref 0–0.2)
BASOPHILS # BLD AUTO: 0.02 K/UL (ref 0–0.2)
BASOPHILS NFR BLD: 0.2 % (ref 0–1.9)
BASOPHILS NFR BLD: 0.4 % (ref 0–1.9)
BILIRUB DIRECT SERPL-MCNC: 0.4 MG/DL (ref 0.1–0.3)
BILIRUB SERPL-MCNC: 1.2 MG/DL (ref 0.1–1)
BILIRUB SERPL-MCNC: 2.1 MG/DL (ref 0.1–1)
BSA FOR ECHO PROCEDURE: 1.9 M2
BUN SERPL-MCNC: 30 MG/DL (ref 6–20)
BUN SERPL-MCNC: 31 MG/DL (ref 6–20)
BUN SERPL-MCNC: 31 MG/DL (ref 6–20)
CALCIUM SERPL-MCNC: 7.9 MG/DL (ref 8.7–10.5)
CALCIUM SERPL-MCNC: 8 MG/DL (ref 8.7–10.5)
CALCIUM SERPL-MCNC: 8 MG/DL (ref 8.7–10.5)
CHLORIDE SERPL-SCNC: 110 MMOL/L (ref 95–110)
CHLORIDE SERPL-SCNC: 110 MMOL/L (ref 95–110)
CHLORIDE SERPL-SCNC: 111 MMOL/L (ref 95–110)
CO2 SERPL-SCNC: 20 MMOL/L (ref 23–29)
CREAT SERPL-MCNC: 2.7 MG/DL (ref 0.5–1.4)
CREAT SERPL-MCNC: 2.9 MG/DL (ref 0.5–1.4)
CREAT SERPL-MCNC: 2.9 MG/DL (ref 0.5–1.4)
CV ECHO LV RWT: 0.54 CM
DAT IGG-SP REAG RBC-IMP: NORMAL
DIFFERENTIAL METHOD: ABNORMAL
DIFFERENTIAL METHOD: ABNORMAL
E WAVE DECELERATION TIME: 73.47 MSEC
E/A RATIO: 4.8
E/E' RATIO: 14.4 M/S
ECHO LV POSTERIOR WALL: 1.25 CM (ref 0.6–1.1)
EJECTION FRACTION: 50 %
EOSINOPHIL # BLD AUTO: 0.1 K/UL (ref 0–0.5)
EOSINOPHIL # BLD AUTO: 0.1 K/UL (ref 0–0.5)
EOSINOPHIL NFR BLD: 1.3 % (ref 0–8)
EOSINOPHIL NFR BLD: 2.2 % (ref 0–8)
ERYTHROCYTE [DISTWIDTH] IN BLOOD BY AUTOMATED COUNT: 14.4 % (ref 11.5–14.5)
ERYTHROCYTE [DISTWIDTH] IN BLOOD BY AUTOMATED COUNT: 14.8 % (ref 11.5–14.5)
EST. GFR  (AFRICAN AMERICAN): 24 ML/MIN/1.73 M^2
EST. GFR  (AFRICAN AMERICAN): 24 ML/MIN/1.73 M^2
EST. GFR  (AFRICAN AMERICAN): 26 ML/MIN/1.73 M^2
EST. GFR  (NON AFRICAN AMERICAN): 20 ML/MIN/1.73 M^2
EST. GFR  (NON AFRICAN AMERICAN): 20 ML/MIN/1.73 M^2
EST. GFR  (NON AFRICAN AMERICAN): 22 ML/MIN/1.73 M^2
FERRITIN SERPL-MCNC: 3430 NG/ML (ref 20–300)
FRACTIONAL SHORTENING: 20 % (ref 28–44)
GLUCOSE SERPL-MCNC: 114 MG/DL (ref 70–110)
GLUCOSE SERPL-MCNC: 114 MG/DL (ref 70–110)
GLUCOSE SERPL-MCNC: 99 MG/DL (ref 70–110)
HAPTOGLOB SERPL-MCNC: <10 MG/DL (ref 30–250)
HAPTOGLOB SERPL-MCNC: <10 MG/DL (ref 30–250)
HAV IGM SERPL QL IA: NEGATIVE
HBV CORE AB SERPL QL IA: NEGATIVE
HBV CORE IGM SERPL QL IA: NEGATIVE
HBV SURFACE AB SER-ACNC: POSITIVE M[IU]/ML
HBV SURFACE AG SERPL QL IA: NEGATIVE
HCT VFR BLD AUTO: 20.3 % (ref 37–48.5)
HCT VFR BLD AUTO: 21.3 % (ref 37–48.5)
HCV AB SERPL QL IA: NEGATIVE
HGB BLD-MCNC: 7.2 G/DL (ref 12–16)
HGB BLD-MCNC: 7.6 G/DL (ref 12–16)
IMM GRANULOCYTES # BLD AUTO: 0.02 K/UL (ref 0–0.04)
IMM GRANULOCYTES # BLD AUTO: 0.03 K/UL (ref 0–0.04)
IMM GRANULOCYTES NFR BLD AUTO: 0.4 % (ref 0–0.5)
IMM GRANULOCYTES NFR BLD AUTO: 0.6 % (ref 0–0.5)
INTERVENTRICULAR SEPTUM: 1.16 CM (ref 0.6–1.1)
IRON SERPL-MCNC: 44 UG/DL (ref 30–160)
LEFT INTERNAL DIMENSION IN SYSTOLE: 3.74 CM (ref 2.1–4)
LEFT VENTRICLE DIASTOLIC VOLUME INDEX: 54.47 ML/M2
LEFT VENTRICLE DIASTOLIC VOLUME: 99.68 ML
LEFT VENTRICLE MASS INDEX: 115 G/M2
LEFT VENTRICLE SYSTOLIC VOLUME INDEX: 32.6 ML/M2
LEFT VENTRICLE SYSTOLIC VOLUME: 59.67 ML
LEFT VENTRICULAR INTERNAL DIMENSION IN DIASTOLE: 4.65 CM (ref 3.5–6)
LEFT VENTRICULAR MASS: 209.72 G
LV LATERAL E/E' RATIO: 12 M/S
LV SEPTAL E/E' RATIO: 18 M/S
LYMPHOCYTES # BLD AUTO: 0.9 K/UL (ref 1–4.8)
LYMPHOCYTES # BLD AUTO: 1 K/UL (ref 1–4.8)
LYMPHOCYTES NFR BLD: 16.2 % (ref 18–48)
LYMPHOCYTES NFR BLD: 18.9 % (ref 18–48)
MCH RBC QN AUTO: 29.4 PG (ref 27–31)
MCH RBC QN AUTO: 29.5 PG (ref 27–31)
MCHC RBC AUTO-ENTMCNC: 35.5 G/DL (ref 32–36)
MCHC RBC AUTO-ENTMCNC: 35.7 G/DL (ref 32–36)
MCV RBC AUTO: 83 FL (ref 82–98)
MCV RBC AUTO: 83 FL (ref 82–98)
MONOCYTES # BLD AUTO: 0.4 K/UL (ref 0.3–1)
MONOCYTES # BLD AUTO: 0.4 K/UL (ref 0.3–1)
MONOCYTES NFR BLD: 6.5 % (ref 4–15)
MONOCYTES NFR BLD: 8 % (ref 4–15)
MV PEAK A VEL: 0.3 M/S
MV PEAK E VEL: 1.44 M/S
NEUTROPHILS # BLD AUTO: 3.8 K/UL (ref 1.8–7.7)
NEUTROPHILS # BLD AUTO: 4 K/UL (ref 1.8–7.7)
NEUTROPHILS NFR BLD: 69.9 % (ref 38–73)
NEUTROPHILS NFR BLD: 75.4 % (ref 38–73)
NRBC BLD-RTO: 0 /100 WBC
NRBC BLD-RTO: 0 /100 WBC
PLATELET # BLD AUTO: 79 K/UL (ref 150–450)
PLATELET # BLD AUTO: 94 K/UL (ref 150–450)
PMV BLD AUTO: 10.1 FL (ref 9.2–12.9)
PMV BLD AUTO: 9.8 FL (ref 9.2–12.9)
POCT GLUCOSE: 103 MG/DL (ref 70–110)
POCT GLUCOSE: 130 MG/DL (ref 70–110)
POTASSIUM SERPL-SCNC: 4.3 MMOL/L (ref 3.5–5.1)
POTASSIUM SERPL-SCNC: 4.8 MMOL/L (ref 3.5–5.1)
POTASSIUM SERPL-SCNC: 4.8 MMOL/L (ref 3.5–5.1)
PROT SERPL-MCNC: 4.9 G/DL (ref 6–8.4)
PROT SERPL-MCNC: 5.1 G/DL (ref 6–8.4)
PTH-INTACT SERPL-MCNC: 289.8 PG/ML (ref 9–77)
RA PRESSURE: 8 MMHG
RBC # BLD AUTO: 2.45 M/UL (ref 4–5.4)
RBC # BLD AUTO: 2.58 M/UL (ref 4–5.4)
SATURATED IRON: 26 % (ref 20–50)
SODIUM SERPL-SCNC: 137 MMOL/L (ref 136–145)
SODIUM SERPL-SCNC: 137 MMOL/L (ref 136–145)
SODIUM SERPL-SCNC: 138 MMOL/L (ref 136–145)
TDI LATERAL: 0.12 M/S
TDI SEPTAL: 0.08 M/S
TDI: 0.1 M/S
TOTAL IRON BINDING CAPACITY: 172 UG/DL (ref 250–450)
TRANSFERRIN SERPL-MCNC: 116 MG/DL (ref 200–375)
TSH SERPL DL<=0.005 MIU/L-ACNC: 1.87 UIU/ML (ref 0.4–4)
WBC # BLD AUTO: 5.36 K/UL (ref 3.9–12.7)
WBC # BLD AUTO: 5.39 K/UL (ref 3.9–12.7)

## 2022-07-08 PROCEDURE — 51702 INSERT TEMP BLADDER CATH: CPT

## 2022-07-08 PROCEDURE — 99223 PR INITIAL HOSPITAL CARE,LEVL III: ICD-10-PCS | Mod: ,,, | Performed by: INTERNAL MEDICINE

## 2022-07-08 PROCEDURE — 82955 ASSAY OF G6PD ENZYME: CPT | Performed by: NURSE PRACTITIONER

## 2022-07-08 PROCEDURE — 80048 BASIC METABOLIC PNL TOTAL CA: CPT | Performed by: INTERNAL MEDICINE

## 2022-07-08 PROCEDURE — 80100014 HC HEMODIALYSIS 1:1

## 2022-07-08 PROCEDURE — 25000003 PHARM REV CODE 250: Performed by: INTERNAL MEDICINE

## 2022-07-08 PROCEDURE — 84466 ASSAY OF TRANSFERRIN: CPT | Performed by: NURSE PRACTITIONER

## 2022-07-08 PROCEDURE — 63600175 PHARM REV CODE 636 W HCPCS: Performed by: INTERNAL MEDICINE

## 2022-07-08 PROCEDURE — 36415 COLL VENOUS BLD VENIPUNCTURE: CPT | Performed by: INTERNAL MEDICINE

## 2022-07-08 PROCEDURE — 94761 N-INVAS EAR/PLS OXIMETRY MLT: CPT

## 2022-07-08 PROCEDURE — 86880 COOMBS TEST DIRECT: CPT | Performed by: NURSE PRACTITIONER

## 2022-07-08 PROCEDURE — 85025 COMPLETE CBC W/AUTO DIFF WBC: CPT | Performed by: INTERNAL MEDICINE

## 2022-07-08 PROCEDURE — 80048 BASIC METABOLIC PNL TOTAL CA: CPT | Mod: 91,XB | Performed by: INTERNAL MEDICINE

## 2022-07-08 PROCEDURE — 20000000 HC ICU ROOM

## 2022-07-08 PROCEDURE — 86334 IMMUNOFIX E-PHORESIS SERUM: CPT | Performed by: NURSE PRACTITIONER

## 2022-07-08 PROCEDURE — 25000003 PHARM REV CODE 250: Performed by: NURSE PRACTITIONER

## 2022-07-08 PROCEDURE — 84165 PROTEIN E-PHORESIS SERUM: CPT | Mod: 26,,, | Performed by: PATHOLOGY

## 2022-07-08 PROCEDURE — 84165 PATHOLOGIST INTERPRETATION SPE: ICD-10-PCS | Mod: 26,,, | Performed by: PATHOLOGY

## 2022-07-08 PROCEDURE — 83020 HEMOGLOBIN ELECTROPHORESIS: CPT | Performed by: INTERNAL MEDICINE

## 2022-07-08 PROCEDURE — 82607 VITAMIN B-12: CPT | Performed by: NURSE PRACTITIONER

## 2022-07-08 PROCEDURE — 84165 PROTEIN E-PHORESIS SERUM: CPT | Performed by: NURSE PRACTITIONER

## 2022-07-08 PROCEDURE — 86334 PATHOLOGIST INTERPRETATION IFE: ICD-10-PCS | Mod: 26,,, | Performed by: PATHOLOGY

## 2022-07-08 PROCEDURE — 86334 IMMUNOFIX E-PHORESIS SERUM: CPT | Mod: 26,,, | Performed by: PATHOLOGY

## 2022-07-08 PROCEDURE — 82728 ASSAY OF FERRITIN: CPT | Performed by: NURSE PRACTITIONER

## 2022-07-08 PROCEDURE — 80076 HEPATIC FUNCTION PANEL: CPT | Performed by: NURSE PRACTITIONER

## 2022-07-08 PROCEDURE — 99223 1ST HOSP IP/OBS HIGH 75: CPT | Mod: ,,, | Performed by: INTERNAL MEDICINE

## 2022-07-08 PROCEDURE — 80053 COMPREHEN METABOLIC PANEL: CPT | Performed by: INTERNAL MEDICINE

## 2022-07-08 PROCEDURE — 83010 ASSAY OF HAPTOGLOBIN QUANT: CPT | Performed by: NURSE PRACTITIONER

## 2022-07-08 PROCEDURE — 83970 ASSAY OF PARATHORMONE: CPT | Performed by: INTERNAL MEDICINE

## 2022-07-08 PROCEDURE — 84443 ASSAY THYROID STIM HORMONE: CPT | Performed by: NURSE PRACTITIONER

## 2022-07-08 PROCEDURE — 83020 HEMOGLOBIN ELECTROPHORESIS: CPT | Mod: 91 | Performed by: INTERNAL MEDICINE

## 2022-07-08 RX ORDER — TALC
6 POWDER (GRAM) TOPICAL NIGHTLY PRN
Status: DISCONTINUED | OUTPATIENT
Start: 2022-07-09 | End: 2022-07-17 | Stop reason: HOSPADM

## 2022-07-08 RX ORDER — ACETAMINOPHEN AND CODEINE PHOSPHATE 300; 30 MG/1; MG/1
1 TABLET ORAL EVERY 4 HOURS PRN
Status: DISCONTINUED | OUTPATIENT
Start: 2022-07-08 | End: 2022-07-17 | Stop reason: HOSPADM

## 2022-07-08 RX ORDER — HEPARIN SODIUM 5000 [USP'U]/ML
4000 INJECTION, SOLUTION INTRAVENOUS; SUBCUTANEOUS ONCE
Status: DISCONTINUED | OUTPATIENT
Start: 2022-07-08 | End: 2022-07-08

## 2022-07-08 RX ORDER — NICARDIPINE HYDROCHLORIDE 0.2 MG/ML
0-15 INJECTION INTRAVENOUS CONTINUOUS
Status: DISCONTINUED | OUTPATIENT
Start: 2022-07-08 | End: 2022-07-12

## 2022-07-08 RX ORDER — HEPARIN SODIUM 1000 [USP'U]/ML
4000 INJECTION, SOLUTION INTRAVENOUS; SUBCUTANEOUS ONCE
Status: DISCONTINUED | OUTPATIENT
Start: 2022-07-08 | End: 2022-07-17 | Stop reason: HOSPADM

## 2022-07-08 RX ADMIN — NITROGLYCERIN 240 MCG/MIN: 20 INJECTION INTRAVENOUS at 02:07

## 2022-07-08 RX ADMIN — Medication 6 MG: at 11:07

## 2022-07-08 RX ADMIN — MUPIROCIN: 20 OINTMENT TOPICAL at 09:07

## 2022-07-08 RX ADMIN — ACETAMINOPHEN AND CODEINE PHOSPHATE 1 TABLET: 300; 30 TABLET ORAL at 10:07

## 2022-07-08 RX ADMIN — NICARDIPINE HYDROCHLORIDE 4 MG/HR: 0.2 INJECTION, SOLUTION INTRAVENOUS at 05:07

## 2022-07-08 RX ADMIN — INSULIN ASPART 1 UNITS: 100 INJECTION, SOLUTION INTRAVENOUS; SUBCUTANEOUS at 08:07

## 2022-07-08 RX ADMIN — ACETAMINOPHEN AND CODEINE PHOSPHATE 1 TABLET: 300; 30 TABLET ORAL at 03:07

## 2022-07-08 RX ADMIN — MUPIROCIN: 20 OINTMENT TOPICAL at 10:07

## 2022-07-08 RX ADMIN — NICARDIPINE HYDROCHLORIDE 2 MG/HR: 0.2 INJECTION, SOLUTION INTRAVENOUS at 02:07

## 2022-07-08 NOTE — CARE UPDATE
Notified by nursing that patient remains hypertensive with SBP >220 after receiving labetalol x 2. Patient states she does not know what BP meds she is supposed to take and doesn't take any. Discussed with Dr. Clark     Plan: Nitro infusion

## 2022-07-08 NOTE — NURSING
1750 Received from ED. Awake and alert. Right IJ Trialysis cath intact. Generalized edema.  Dialysis nurse here to start treatment.   1830 Spoke to Dr Cody regarding elevated BP. States to  start dialysis first and if no improvement in BP then can give Labetalol 10mg ivp every 6 hours prn.

## 2022-07-08 NOTE — NURSING
Spoke with Dr Clark, states cvc is high, can yse it for treatment tonight however it will need to be changed tomorrow

## 2022-07-08 NOTE — NURSING
Arrived in patients room to start HD treatment, noted that cvc to right neck is pulled out 8 dots , it was not like this last night when I did treatment, nurse states she just pulled blood out of it and sutures in place, Dr Clark called, stated to do xray to confirm cvc still in place

## 2022-07-08 NOTE — PLAN OF CARE
POC reviewed with the patient and she verbalized understanding. All comments and concerns addressed.     AxOx4. PERRLA; periorbital swelling noted, under eyes darkened compared to rest of skin. NSR/ST on monitor. Cardene utilized for BP management. Pelayo in place--concentrated urine. Diabetic/renal diet with 1.5L fluid restriction in place.    Dialysis today--dialysis RN raised concern for placement of trialysis at arrival to bedside. I changed the dressing this morning and noted sutures to be intact and line does not appear to have moved compared to this am during the dressing change. RN notified Amber and Glo SPRINGER, chest xray ordered. Amber SPRINGER noted on xray that the trialysis appears to be too high and that a new line needs to be placed; however MD okay with dialysis RN using line at this moment to attempt dialysis. Information relayed to Glo and general surgery consult ordered for tomorrow morning.    Bed locked in lowest position with bed alarm set, call light within reach. Safety precautions maintained.

## 2022-07-08 NOTE — CONSULTS
"[facility]  Hematology/Oncology  Consult Note    Patient Name: Tabby Howard  MRN: [id number]  Admission Date: 7/7/2022  Hospital Length of Stay: 1 days  Code Status: Prior   Attending Provider: Keegan Cody MD  Consulting Provider: Leigh Ann Sanford NP  Primary Care Physician: Primary Doctor No  Principal Problem:Acute renal failure superimposed on chronic kidney disease    Consults  Subjective:     HPI: 32 y.o. female with a PMHx of DM II, supraventricular tachycardia, heart failure with preserved ejection fraction (04/12/22), Sickle cell trait, and CKD (stage IV) who states that she came to the ED because of abnormal labs. She has been having shortness of breathness, feeling lethargic and generalized weakness for few weeks but has been feeling worse.  Patient had lab work done this morning and was informed of abnormal "kidney" results with referral to the ED for further evaluation and work up.  She endorses diffuse back pain over the past month, intermittent chills as well as diarrhea a few days ago that has since improved.  Patient specifies she is able to urinate but notes that the volume has decreased.  Also states that her  leg swelling has been getting worse due to her being "full of fluid."  LMP was in December 2021.  per med rec.  Patient reports she had irregular menstrual cycles up until December.  She has been receiving intermittent blood transfusions and in the night of the hospital since December    Oncology Consult:  We have been consulted for severe anemia.  On arrival she was noted to have a hemoglobin of 4.9.  She is now status post 2 units packed red blood cell.  Patient reports she seen a hematologist years ago and was told she had sickle cell trait.  At the time of my interview she is in bed resting quietly with no obvious signs of distress.  She denies any evidence of bleeding.    Medications:  Continuous Infusions:   nicardipine 2 mg/hr (07/08/22 0647)    nitroGLYCERIN " Stopped (22 0521)     Scheduled Meds:   heparin, porcine (PF)  4,000 Units Intra-Catheter Once    mupirocin   Nasal BID     PRN Meds:sodium chloride, dextrose 10%, dextrose 10%, dextrose 10%, glucagon (human recombinant), glucose, glucose, insulin aspart U-100, labetaloL, sodium chloride 0.9%     Review of patient's allergies indicates:   Allergen Reactions    Penicillins Hives        Past Medical History:   Diagnosis Date    CKD (chronic kidney disease), stage IV 2022    Diabetes mellitus due to underlying condition with unspecified complications 2022    Gastroparesis 2022    Heart failure with preserved ejection fraction 2022    EF 55% on 3/22    History of gastroesophageal reflux (GERD)     History of supraventricular tachycardia     Hyperkalemia 2022    Sickle cell trait 2022    Type 2 diabetes mellitus      Past Surgical History:   Procedure Laterality Date     SECTION      x 3    ESOPHAGOGASTRODUODENOSCOPY N/A 10/18/2019    Procedure: ESOPHAGOGASTRODUODENOSCOPY (EGD);  Surgeon: Gianluca Mendez MD;  Location: [facility];  Service: Endoscopy;  Laterality: N/A;     Family History     Problem Relation (Age of Onset)    Diabetes Mother, Father        Tobacco Use    Smoking status: Never Smoker    Smokeless tobacco: Never Used   Substance and Sexual Activity    Alcohol use: No    Drug use: No    Sexual activity: Not Currently     Partners: Male     Birth control/protection: I.U.D.       Review of Systems   Constitutional: Positive for fatigue. Negative for fever.   HENT: Negative for facial swelling and trouble swallowing.    Eyes: Negative for discharge.   Respiratory: Negative for cough, chest tightness, shortness of breath and wheezing.    Cardiovascular: Positive for leg swelling. Negative for chest pain and palpitations.   Gastrointestinal: Negative for abdominal pain, nausea and vomiting.   Genitourinary: Negative for difficulty urinating, dysuria  and hematuria.   Musculoskeletal: Positive for back pain. Negative for arthralgias, joint swelling, myalgias, neck pain and neck stiffness.   Skin: Negative for color change, pallor, rash and wound.   Neurological: Negative for dizziness, syncope, weakness, light-headedness, numbness and headaches.   Hematological: Does not bruise/bleed easily.   Psychiatric/Behavioral: The patient is not nervous/anxious.    All other systems reviewed and are negative.  Objective:     Vital Signs (Most Recent):  Temp: 98.5 °F (36.9 °C) (07/08/22 0800)  Pulse: 105 (07/08/22 1000)  Resp: (!) 8 (07/08/22 1000)  BP: (!) 143/101 (07/08/22 1000)  SpO2: 95 % (07/08/22 1000) Vital Signs (24h Range):  Temp:  [96.9 °F (36.1 °C)-98.5 °F (36.9 °C)] 98.5 °F (36.9 °C)  Pulse:  [] 105  Resp:  [8-30] 8  SpO2:  [85 %-100 %] 95 %  BP: (141-231)/() 143/101     Weight: 82.1 kg (181 lb)  Body mass index is 33.11 kg/m².  Body surface area is 1.9 meters squared.      Intake/Output Summary (Last 24 hours) at 7/8/2022 1413  Last data filed at 7/8/2022 0900  Gross per 24 hour   Intake 1802.28 ml   Output 1950 ml   Net -147.72 ml       Physical Exam  PHYSICAL EXAM:     Vitals:    07/08/22 1000   BP: (!) 143/101   Pulse: 105   Resp: (!) 8   Temp:        GENERAL: Comfortable looking patient. Patient is in no distress.  Awake, alert and oriented to time, person and place.  No anxiety, or agitation.      HEENT: Normal conjunctivae and eyelids. WNL.  PERRLA 3 to 4 mm. No icterus, no pallor, no congestion, and no discharge noted.     NECK:  Supple. Trachea is central.  No crepitus.  No JVD or masses.    RESPIRATORY:  No intercostal retractions.  No dullness to percussion.  Chest is clear to auscultation.  No rales, rhonchi or wheezes.  No crepitus.  Good air entry bilaterally.    CARDIOVASCULAR:  S1 and S2 are normally heard without murmurs or gallops.  All peripheral pulses are present.    ABDOMEN:  Normal abdomen.  No hepatosplenomegaly.  No free  fluid.  Bowel sounds are present.  No hernia noted. No masses.  No rebound or tenderness.  No guarding or rigidity.  Umbilicus is midline.    LYMPHATICS:  No axillary, cervical, supraclavicular, submental, or inguinal lymphadenopathy.    SKIN/MUSCULOSKELETAL:  There is no evidence of excoriation marks or ecchmosis.  No rashes.  No cyanosis.  No clubbing.  No joint or skeletal deformities noted.  Normal range of motion. + edema noted in bilateral lower extremities    NEUROLOGIC:  Higher functions are appropriate.  No cranial nerve deficits.  Normal jatin.  Normal strength.  Motor and sensory functions are normal.  Deep tendon reflexes are normal.    GENITAL/RECTAL:  Exams are deferred.  Significant Labs:   BMP:   Recent Labs   Lab 07/07/22  1451 07/08/22  0028 07/08/22  0516   GLU 95 99 114*  114*    138 137  137   K 6.1* 4.3 4.8  4.8   * 111* 110  110   CO2 13* 20* 20*  20*   BUN 49* 30* 31*  31*   CREATININE 4.2* 2.7* 2.9*  2.9*   CALCIUM 8.3* 7.9* 8.0*  8.0*   , CBC:   Recent Labs   Lab 07/07/22  0853 07/08/22  0028 07/08/22  0516   WBC 4.59 5.39 5.36   HGB 4.9* 7.2* 7.6*   HCT 14.6* 20.3* 21.3*   PLT 95* 79* 94*    and CMP:   Recent Labs   Lab 07/07/22  0853 07/07/22  1451 07/08/22  0028 07/08/22  0516    139 138 137  137   K 6.4* 6.1* 4.3 4.8  4.8   * 117* 111* 110  110   CO2 15* 13* 20* 20*  20*   GLU 61* 95 99 114*  114*   BUN 49* 49* 30* 31*  31*   CREATININE 4.2* 4.2* 2.7* 2.9*  2.9*   CALCIUM 8.2* 8.3* 7.9* 8.0*  8.0*   PROT  --   --   --  4.9*   ALBUMIN 2.1*  --   --  2.0*   BILITOT  --   --   --  2.1*   ALKPHOS  --   --   --  76   AST  --   --   --  17   ALT  --   --   --  16   ANIONGAP 6* 9 7* 7*  7*   EGFRNONAA 13* 13* 22* 20*  20*       Diagnostic Results:  I have reviewed all pertinent imaging results/findings within the past 24 hours.    Assessment/Plan:     Active Diagnoses:    Diagnosis Date Noted POA    PRINCIPAL PROBLEM:  Acute renal failure superimposed  on chronic kidney disease [N17.9, N18.9] 07/07/2022 Yes    Hypertensive emergency [I16.1] 07/08/2022 Yes    Anemia [D64.9] 07/07/2022 Yes    Seizure [R56.9] 05/29/2022 Yes    CKD (chronic kidney disease), stage IV [N18.4] 04/12/2022 Yes    Sickle cell trait [D57.3] 04/12/2022 Yes    Pericardial effusion [I31.3] 03/07/2022 Yes    Anemia of chronic kidney failure [N18.9, D63.1] 04/24/2021 Yes    Type II diabetes mellitus [E11.9] 10/16/2019 Yes      Problems Resolved During this Admission:    Diagnosis Date Noted Date Resolved POA    Hyperkalemia [E87.5] 07/07/2022 07/08/2022 Yes     Anemia:  Concerning for hemolytic anemia  -proceed with anemia workup  -we will follow-up all results  -transfuse 1 unit packed red blood cell for hemoglobin less than 7    Acute renal failure on chronic kidney disease:  Managed by Nephrology  Patient will need to establish care with a nephrologist outpatient    Uncontrolled hypertension:  Managed by hospitalist    CHF:  Managed by Cardiology   Thank you for your consult. I will follow-up with patient. Please contact us if you have any additional questions.    Leigh Ann Sanford NP  Hematology/Oncology  [facility]

## 2022-07-08 NOTE — EICU
EICU Physician Brief Note - Overnight Events    Called for patient with uncontrolled HTN despite NTG gtt and completed HD.  A/P:  Start Nicardipine gtt.  Once on Nicardipine, titrate off NTG gtt.  Goal -180, CBP  in the first 24 hours (thereafter can have a lower goal).  Eicu is available should acute issues arise.

## 2022-07-08 NOTE — PROGRESS NOTES
1000 ml net uf removed, received 2 units PRBC's with treatment   07/07/22 2130   Vital Signs   Temp 98.2 °F (36.8 °C)   Pulse 98   Resp 12   BP (!) 211/135   Assessments (Pre/Post)   Date Hepatitis Profile Obtained 07/07/22   Blood Liters Processed (BLP) 43.1   Transport Modality not applicable   Level of Consciousness (AVPU) alert   Dialyzer Clearance mildly streaked   Pain   Preferred Pain Scale number (Numeric Rating Pain Scale)   Pain Rating (0-10): Rest 4   Pre-Hemodialysis Assessment   Treatment Status Completed   Trialysis (Dialysis) Catheter 07/07/22 1553 right internal jugular   Placement Date/Time: 07/07/22 1553   Present Prior to Hospital Arrival?: No  Location: right internal jugular  Pressure Injectable Catheter: Yes  Placement Verification: X-ray   Site Assessment No drainage;No redness;No swelling;No warmth   Line Securement Device Secured with sutures   Dressing Type Transparent (Tegaderm)   Dressing Status Clean;Dry;Intact   Dressing Intervention Integrity maintained   Date on Dressing 07/07/22   Dressing Due to be Changed 07/14/22   Venous Patency/Care heparin locked   Arterial Patency/Care heparin locked   Flows Good   Line Necessity Review CRRT/HD   Post-Hemodialysis Assessment   Rinseback Volume (mL) 250 mL   Blood Volume Processed (Liters) 43.1 L   Dialyzer Clearance Lightly streaked   Duration of Treatment 180 minutes   Additional Fluid Intake (mL) 500 mL   Total UF (mL) 1500 mL   Net Fluid Removal 1000   Patient Response to Treatment tolerated well   Post-Treatment Weight 80.5 kg (177 lb 7.5 oz)   Treatment Weight Change -1   Post-Hemodialysis Comments tx completed   Educated to HD treatment and s/s blood transfusion reaction

## 2022-07-08 NOTE — PLAN OF CARE
[facility]  Initial Discharge Assessment       Primary Care Provider: Primary Doctor No    Admission Diagnosis: Hyperkalemia [E87.5]  Pericardial effusion [I31.3]  Encounter for central line placement [Z45.2]    Admission Date: 7/7/2022  Expected Discharge Date:      cm completed the assessment with pt at bedside. Demographic is current on face sheet. However, pt is  currently staying with parents and 3 children (14,12,& 7) due to her illness. PCP is Dr. Cervantes. Pt is a diabetic, denies dialysis and coumadin. Disposition:  Pt will discharge with her father when medically cleared.  Cm informed pt will be a new dialysis pt. Cm communicated with pt about dialysis schedule  Process.  Pt would like the 2nd shift(noon), first available for the days. Cm will continue following.    Payor: MEDICAID / Plan: HE[place]UE [place] / Product Type: Managed Medicaid /     Extended Emergency Contact Information  Primary Emergency Contact: Garcia Howell  Mobile Phone: [phone]  Relation: Father  Preferred language: English   needed? No  Secondary Emergency Contact: arlen howell  Mobile Phone: [phone]  Relation: Step parent  Preferred language: English   needed? No    Discharge Plan A: Home with family  Discharge Plan B: Home with family      [id number] [place], [place] - [address] [address] [address] [address]  [address] [address]  Phone: [phone] Fax: [phone]      Initial Assessment (most recent)     Adult Discharge Assessment - 07/08/22 1210        Discharge Assessment    Assessment Type Discharge Planning Assessment     Confirmed/corrected address, phone number and insurance Yes     Confirmed Demographics Correct on Facesheet     Source of Information patient     Lives With parent(s);child(tammy), dependent     Do you expect to return to your current living situation? Yes     Do you have help at home or  someone to help you manage your care at home? Yes     Who are your caregiver(s) and their phone number(s)? father- Garcia - [phone]/ step mother - Tanya - [phone]     Prior to hospitilization cognitive status: Alert/Oriented     Current cognitive status: Alert/Oriented     Walking or Climbing Stairs Difficulty ambulation difficulty, assistance 1 person     Equipment Currently Used at Home walker, rolling;glucometer     Readmission within 30 days? No     Patient currently being followed by outpatient case management? No     Do you currently have service(s) that help you manage your care at home? No     Do you take prescription medications? Yes     Do you have prescription coverage? Yes     Do you have any problems affording any of your prescribed medications? No     Is the patient taking medications as prescribed? yes     Who is going to help you get home at discharge? father     How do you get to doctors appointments? family or friend will provide;car, drives self     Are you on dialysis? No     Do you take coumadin? No     Discharge Plan A Home with family     Discharge Plan B Home with family     DME Needed Upon Discharge  none     Discharge Plan discussed with: Patient

## 2022-07-08 NOTE — NURSING
Spoke w/ NILS Parr NP at 2040 regarding hypertension. Looking through the chart for at home meds. Pt is unsure of what she takes at home, but states she takes a fluid pill.  Will follow up with the father who helps manage medications.     NILS Parr NP spoke w/ Dr. Clark. Ordering nitroglycerin gtt to keep BP above 160/90.     Nitroglycerin gtt started at 2116 at 10 mcg/ min. Pt's BP at 2115- 219/142. Will continue to titrate.     Charge nurse spoke w/ father and stepmother regarding medications. Home meds to be updated. Family updated of pt status.

## 2022-07-08 NOTE — SUBJECTIVE & OBJECTIVE
Interval History: Patient continues to feel lethargic week. SOB better. Blood pressure better with nicardipine drip. Hnh improved after 2 units Prbcs potassium 4.8     Review of Systems   Unable to perform ROS: Acuity of condition   Constitutional:  Positive for fatigue. Negative for chills.   HENT:  Negative for sore throat.    Respiratory:  Negative for shortness of breath.    Gastrointestinal:  Negative for abdominal pain.   Genitourinary:  Negative for decreased urine volume.   Musculoskeletal:  Positive for back pain.   Skin:  Positive for pallor.   Allergic/Immunologic: Positive for immunocompromised state.   Neurological:  Positive for weakness. Negative for seizures and headaches.   Psychiatric/Behavioral:  Negative for behavioral problems.    Objective:     Vital Signs (Most Recent):  Temp: 98.5 °F (36.9 °C) (07/08/22 0800)  Pulse: 100 (07/08/22 0845)  Resp: 11 (07/08/22 0845)  BP: (!) 173/99 (07/08/22 0845)  SpO2: 100 % (07/08/22 0845)   Vital Signs (24h Range):  Temp:  [96.9 °F (36.1 °C)-98.5 °F (36.9 °C)] 98.5 °F (36.9 °C)  Pulse:  [] 100  Resp:  [11-25] 11  SpO2:  [85 %-100 %] 100 %  BP: (141-231)/() 173/99     Weight: 82.5 kg (181 lb 14.1 oz)  Body mass index is 33.27 kg/m².    Intake/Output Summary (Last 24 hours) at 7/8/2022 1001  Last data filed at 7/8/2022 0900  Gross per 24 hour   Intake 1682.28 ml   Output 1950 ml   Net -267.72 ml      Physical Exam  Vitals and nursing note reviewed.   Constitutional:       General: She is not in acute distress.     Appearance: She is well-developed. She is ill-appearing. She is not diaphoretic.      Comments: Anasarca.   HENT:      Head: Normocephalic and atraumatic.      Right Ear: External ear normal.      Left Ear: External ear normal.      Nose: Nose normal.      Mouth/Throat:      Mouth: Mucous membranes are moist.   Eyes:      General: No scleral icterus.     Extraocular Movements: Extraocular movements intact.      Conjunctiva/sclera:  Conjunctivae normal.      Pupils: Pupils are equal, round, and reactive to light.   Neck:      Vascular: No JVD.   Cardiovascular:      Rate and Rhythm: Normal rate and regular rhythm.      Pulses: Normal pulses.      Heart sounds: Normal heart sounds. No murmur heard.    No friction rub. No gallop.   Pulmonary:      Effort: Pulmonary effort is normal. No respiratory distress.      Breath sounds: Normal breath sounds. No wheezing or rales.   Abdominal:      General: Bowel sounds are normal. There is no distension.      Palpations: Abdomen is soft.      Tenderness: There is no abdominal tenderness. There is no guarding or rebound.   Genitourinary:     Comments: Pelayo.  Musculoskeletal:         General: No tenderness. Normal range of motion.      Cervical back: Neck supple.      Right lower leg: Edema present.      Left lower leg: Edema present.   Skin:     General: Skin is warm and dry.      Coloration: Skin is not pale.      Findings: No erythema or rash.   Neurological:      Mental Status: She is oriented to person, place, and time.      Cranial Nerves: No cranial nerve deficit.      Motor: No weakness.   Psychiatric:         Mood and Affect: Mood normal.         Behavior: Behavior normal.       Significant Labs: All pertinent labs within the past 24 hours have been reviewed.  CBC:   Recent Labs   Lab 07/07/22  0853 07/08/22  0028 07/08/22  0516   WBC 4.59 5.39 5.36   HGB 4.9* 7.2* 7.6*   HCT 14.6* 20.3* 21.3*   PLT 95* 79* 94*     CMP:   Recent Labs   Lab 07/07/22  0853 07/07/22  1451 07/08/22  0028 07/08/22  0516    139 138 137   K 6.4* 6.1* 4.3 4.8   * 117* 111* 110   CO2 15* 13* 20* 20*   GLU 61* 95 99 114*   BUN 49* 49* 30* 31*   CREATININE 4.2* 4.2* 2.7* 2.9*   CALCIUM 8.2* 8.3* 7.9* 8.0*   ALBUMIN 2.1*  --   --   --    ANIONGAP 6* 9 7* 7*   EGFRNONAA 13* 13* 22* 20*       Significant Imaging: I have reviewed all pertinent imaging results/findings within the past 24 hours.

## 2022-07-08 NOTE — CONSULTS
"INPATIENT NEPHROLOGY CONSULT   [facility]    Patient Name: Tabby Howard  Date: 2022    Reason for consultation: KANWAL    Chief Complaint:   Chief Complaint   Patient presents with    Abnormal Lab       History of Present Illness:  Tabby Howard is a 32 y.o. female with a PMHx of DM II, supraventricular tachycardia, heart failure with preserved ejection fraction (22), Sickle cell trait, and CKD (stage IV) who states that she came to the ED because of abnormal labs. She has been having shortness of breathness, feeling lethargic and generalized weakness for few weeks but has been feeling worse.  Patient had lab work done this morning and was informed of abnormal "kidney" results with referral to the ED for further evaluation and work up.  She endorses diffuse back pain over the past month, intermittent chills as well as diarrhea a few days ago that has since improved.  Patient specifies she is able to urinate but notes that the volume has decreased.  Also states that her  leg swelling has been getting worse due to her being "full of fluid."  LMP was in 2021.  The patient denies fever, N/V, abdominal pain on exam, CP or any other symptoms at this time.  PSHx includes EGD and . She was seen by our group during her last hospitalization and was scheduled for f/u with Dr. BATRES next week- previsit labs showed severe anemia and metabolic derangements so she was sent into the hospital.      JUAN A neg, UA with 3+ protein, 1+ blood  - UPCR 10.8    Admit CT AP:  Interval development of right pleural effusion.  Stable prominent pericardial effusion.  Interstitial prominence raising question of pulmonary edema.  Stable abdomen and pelvis with ascites, anasarca.    Interval History:  - hypertensive emergency, Hb 4, K 6.4, CO2 15 in the ER- spoke with ER physician and advised trialysis cath placement for emergent HD with blood transfusion- did HD overnight, started NTG gtt for " BP control- switched to nicardipine gtt overnight- got 2u of blood- washington placed- oliguric, BP better, clearance parameters are better    Plan of Care:    Assessment:  KANWAL/CKD V- likely now ESRD- history of uncontrolled DMII  Nephrotic syndrome with ansaraca; History of microscopic hematuria  HTN emergency/D CHF/[place] pulm edema/Pericardial effusion/Pleural effusion  Hyperkalemia/Acidosis  SHPT  Hemolytic anemia/Thrombocytopenia; likely component of Anemia of CKD as well    Plan:    - Initiated RRT on 7/7- plan for HD/UF today and tomorrow. Anticipate discharge with dialysis with outpatient renal biopsy. Continue washington for now.  - On cardene gtt- aim for BP ~150/90- once we get more fluid off, will start to add oral BP medication and diuretics. Added renal diet with a 1.5L fluid restriction.   - Will f/u echo.  - Check phos, PTH.  - Hb is improved after 2u of blood- consulted heme/onc for assistance- r/o TTP.    Thank you for allowing us to participate in this patient's care. We will continue to follow.    Vital Signs:  Temp Readings from Last 3 Encounters:   07/08/22 98.5 °F (36.9 °C) (Oral)   06/11/22 98.2 °F (36.8 °C) (Oral)   06/07/22 98.1 °F (36.7 °C) (Axillary)       Pulse Readings from Last 3 Encounters:   07/08/22 105   06/11/22 100   06/07/22 110       BP Readings from Last 3 Encounters:   07/08/22 (!) 143/101   06/11/22 (!) 173/105   06/07/22 129/81       Weight:  Wt Readings from Last 3 Encounters:   07/08/22 82.1 kg (181 lb)   06/11/22 73.9 kg (163 lb)   06/07/22 74.2 kg (163 lb 9.3 oz)       INS/OUTS:  I/O last 3 completed shifts:  In: 1682.3 [I.V.:349.7; Blood:791; Other:500; IV Piggyback:41.6]  Out: 1905 [Urine:405; Other:1500]  I/O this shift:  In: -   Out: 45 [Urine:45]    Past Medical & Surgical History:  Past Medical History:   Diagnosis Date    CKD (chronic kidney disease), stage IV 4/12/2022    Diabetes mellitus due to underlying condition with unspecified complications 4/12/2022     Gastroparesis 2022    Heart failure with preserved ejection fraction 2022    EF 55% on 3/22    History of gastroesophageal reflux (GERD)     History of supraventricular tachycardia     Hyperkalemia 2022    Sickle cell trait 2022    Type 2 diabetes mellitus        Past Surgical History:   Procedure Laterality Date     SECTION      x 3    ESOPHAGOGASTRODUODENOSCOPY N/A 10/18/2019    Procedure: ESOPHAGOGASTRODUODENOSCOPY (EGD);  Surgeon: Gianlcua Mendez MD;  Location: [facility];  Service: Endoscopy;  Laterality: N/A;       Past Social History:  Social History     Socioeconomic History    Marital status:    Tobacco Use    Smoking status: Never Smoker    Smokeless tobacco: Never Used   Substance and Sexual Activity    Alcohol use: No    Drug use: No    Sexual activity: Not Currently     Partners: Male     Birth control/protection: I.U.D.       Medications:  Scheduled Meds:   mupirocin   Nasal BID     Continuous Infusions:   nicardipine 2 mg/hr (22 0647)    nitroGLYCERIN Stopped (22 05)     PRN Meds:.sodium chloride, dextrose 10%, dextrose 10%, dextrose 10%, glucagon (human recombinant), glucose, glucose, insulin aspart U-100, labetaloL, sodium chloride 0.9%  No current facility-administered medications on file prior to encounter.     Current Outpatient Medications on File Prior to Encounter   Medication Sig Dispense Refill    FLUoxetine 20 MG capsule Take 1 capsule (20 mg total) by mouth once daily. 30 capsule 1    isosorbide mononitrate (IMDUR) 60 MG 24 hr tablet Take 1 tablet (60 mg total) by mouth once daily. 30 tablet 1    LANTUS U-100 INSULIN 100 unit/mL injection SMARTSI Unit(s) SUB-Q Every Morning      levETIRAcetam (KEPPRA) 500 MG Tab Take 1 tablet (500 mg total) by mouth 2 (two) times daily. 60 tablet 1    torsemide (DEMADEX) 20 MG Tab Take 1 tablet (20 mg total) by mouth 2 (two) times a day. (Patient taking differently: Take 20 mg by mouth  "once daily.) 60 tablet 1    [DISCONTINUED] furosemide (LASIX) 40 MG tablet Take 1 tablet (40 mg total) by mouth 2 (two) times daily. 60 tablet 0    [DISCONTINUED] pantoprazole (PROTONIX) 40 MG tablet Take 1 tablet (40 mg total) by mouth once daily. 30 tablet 3       Allergies:  Penicillins    Past Family History:  Reviewed; refer to Hospitalist Admission Note    Review of Systems:  Review of Systems - All 14 systems reviewed and negative, except as noted in HPI    Physical Exam:  BP (!) 143/101   Pulse 105   Temp 98.5 °F (36.9 °C) (Oral)   Resp (!) 8   Ht 5' 2" (1.575 m)   Wt 82.1 kg (181 lb)   LMP 12/01/2021 (Approximate)   SpO2 95%   Breastfeeding No   BMI 33.11 kg/m²     General Appearance:    NAD, AAO x 3, cooperative, appears stated age   Head:    Normocephalic, atraumatic   Eyes:    PER, EOMI, and conjunctiva/sclera clear bilaterally        Mouth:   Moist mucus membranes   Back:     No CVA tenderness   Lungs:     Rales   Heart:    Regular rate and rhythm, S1 and S2 normal, no murmur, rub   or gallop   Abdomen:     Soft, non-tender, non-distended, bowel sounds active all four   quadrants, no RT or guarding, no masses, no organomegaly   Extremities:   Warm and well perfused, anasarca   MSK:   No joint or muscle swelling, tenderness or deformity   Skin:   Skin color, texture, turgor normal, no rashes or lesions   Neurologic/Psychiatric:   CNII-XII intact, normal strength and sensation       throughout, no asterixis; normal affect, memory, judgement     and insight     Results:  Lab Results   Component Value Date     07/08/2022    K 4.8 07/08/2022     07/08/2022    CO2 20 (L) 07/08/2022    BUN 31 (H) 07/08/2022    CREATININE 2.9 (H) 07/08/2022    CALCIUM 8.0 (L) 07/08/2022    ANIONGAP 7 (L) 07/08/2022    ESTGFRAFRICA 24 (A) 07/08/2022    EGFRNONAA 20 (A) 07/08/2022       Lab Results   Component Value Date    CALCIUM 8.0 (L) 07/08/2022    PHOS 4.4 07/07/2022       Recent Labs   Lab " 07/08/22  0516   WBC 5.36   RBC 2.58*   HGB 7.6*   HCT 21.3*   PLT 94*   MCV 83   MCH 29.5   MCHC 35.7       I have personally reviewed pertinent radiological imaging and reports.    I have spent > 70 minutes providing care for this patient for the above diagnoses. These services have included chart/data/imaging review, evaluation, exam, formulation of plan, , note preparation, and discussions with staff involved in this patient's care.    Prateek Clark MD MPH  [facility] [address] [address]  [phone] (p)  [phone] (f)

## 2022-07-08 NOTE — ASSESSMENT & PLAN NOTE
Pt had 1 cycle of HD yesterday New HD  Follow renal panel and electrolytes closely.  Follow renal recommendations.  CT r/o renal stone  Adjust renal dose medications for creatinine clearance 10-50cc/min.  Avoid NSAIDs, De Leon-II inhibitors, ACE-I, Angiotensin Receptor Blockers, or Aminoglycosides.  Urine analysis.  Will defer following workup to nephro   JUAN A, C3, C4, ESR, UPEP and SPEP  Check Urine Eosinophils.  Check  Urine Na, Cr, Protein.

## 2022-07-08 NOTE — PROGRESS NOTES
"[facility] Medicine  Progress Note    Patient Name: Tabby Howard  MRN: [id number]  Patient Class: IP- Inpatient   Admission Date: 2022  Length of Stay: 1 days  Attending Physician: Keegan Cody MD  Primary Care Provider: Primary Doctor No        Subjective:     Principal Problem:Acute renal failure superimposed on chronic kidney disease        HPI:  Tabby Howard is a 32 y.o. female with a PMHx of DM II, supraventricular tachycardia, heart failure with preserved ejection fraction (22), Sickle cell trait, and CKD (stage IV) who states that she came to the ED because of abnormal labs. She has been having shortness of breathness, feeling lethargic and generalized weakness for few weeks but has been feeling worse.  Patient had lab work done this morning and was informed of abnormal "kidney" results with referral to the ED for further evaluation and work up.  She endorses diffuse back pain over the past month, intermittent chills as well as diarrhea a few days ago that has since improved.  Patient specifies she is able to urinate but notes that the volume has decreased.  Also states that her  leg swelling has been getting worse due to her being "full of fluid."  LMP was in 2021.  The patient denies fever, N/V, abdominal pain on exam, CP or any other symptoms at this time.  PSHx includes EGD and .  Does not see a nephrologist for her CKD     The history is provided by the patient.          Overview/Hospital Course:  No notes on file    Interval History: Patient continues to feel lethargic week. SOB better. Blood pressure better with nicardipine drip. Hnh improved after 2 units Prbcs potassium 4.8     Review of Systems   Unable to perform ROS: Acuity of condition   Constitutional:  Positive for fatigue. Negative for chills.   HENT:  Negative for sore throat.    Respiratory:  Negative for shortness of breath.    Gastrointestinal:  Negative for abdominal pain. "   Genitourinary:  Negative for decreased urine volume.   Musculoskeletal:  Positive for back pain.   Skin:  Positive for pallor.   Allergic/Immunologic: Positive for immunocompromised state.   Neurological:  Positive for weakness. Negative for seizures and headaches.   Psychiatric/Behavioral:  Negative for behavioral problems.    Objective:     Vital Signs (Most Recent):  Temp: 98.5 °F (36.9 °C) (07/08/22 0800)  Pulse: 100 (07/08/22 0845)  Resp: 11 (07/08/22 0845)  BP: (!) 173/99 (07/08/22 0845)  SpO2: 100 % (07/08/22 0845)   Vital Signs (24h Range):  Temp:  [96.9 °F (36.1 °C)-98.5 °F (36.9 °C)] 98.5 °F (36.9 °C)  Pulse:  [] 100  Resp:  [11-25] 11  SpO2:  [85 %-100 %] 100 %  BP: (141-231)/() 173/99     Weight: 82.5 kg (181 lb 14.1 oz)  Body mass index is 33.27 kg/m².    Intake/Output Summary (Last 24 hours) at 7/8/2022 1001  Last data filed at 7/8/2022 0900  Gross per 24 hour   Intake 1682.28 ml   Output 1950 ml   Net -267.72 ml      Physical Exam  Vitals and nursing note reviewed.   Constitutional:       General: She is not in acute distress.     Appearance: She is well-developed. She is ill-appearing. She is not diaphoretic.      Comments: Anasarca.   HENT:      Head: Normocephalic and atraumatic.      Right Ear: External ear normal.      Left Ear: External ear normal.      Nose: Nose normal.      Mouth/Throat:      Mouth: Mucous membranes are moist.   Eyes:      General: No scleral icterus.     Extraocular Movements: Extraocular movements intact.      Conjunctiva/sclera: Conjunctivae normal.      Pupils: Pupils are equal, round, and reactive to light.   Neck:      Vascular: No JVD.   Cardiovascular:      Rate and Rhythm: Normal rate and regular rhythm.      Pulses: Normal pulses.      Heart sounds: Normal heart sounds. No murmur heard.    No friction rub. No gallop.   Pulmonary:      Effort: Pulmonary effort is normal. No respiratory distress.      Breath sounds: Normal breath sounds. No wheezing or  rales.   Abdominal:      General: Bowel sounds are normal. There is no distension.      Palpations: Abdomen is soft.      Tenderness: There is no abdominal tenderness. There is no guarding or rebound.   Genitourinary:     Comments: Pelayo.  Musculoskeletal:         General: No tenderness. Normal range of motion.      Cervical back: Neck supple.      Right lower leg: Edema present.      Left lower leg: Edema present.   Skin:     General: Skin is warm and dry.      Coloration: Skin is not pale.      Findings: No erythema or rash.   Neurological:      Mental Status: She is oriented to person, place, and time.      Cranial Nerves: No cranial nerve deficit.      Motor: No weakness.   Psychiatric:         Mood and Affect: Mood normal.         Behavior: Behavior normal.       Significant Labs: All pertinent labs within the past 24 hours have been reviewed.  CBC:   Recent Labs   Lab 07/07/22  0853 07/08/22  0028 07/08/22  0516   WBC 4.59 5.39 5.36   HGB 4.9* 7.2* 7.6*   HCT 14.6* 20.3* 21.3*   PLT 95* 79* 94*     CMP:   Recent Labs   Lab 07/07/22  0853 07/07/22  1451 07/08/22  0028 07/08/22  0516    139 138 137   K 6.4* 6.1* 4.3 4.8   * 117* 111* 110   CO2 15* 13* 20* 20*   GLU 61* 95 99 114*   BUN 49* 49* 30* 31*   CREATININE 4.2* 4.2* 2.7* 2.9*   CALCIUM 8.2* 8.3* 7.9* 8.0*   ALBUMIN 2.1*  --   --   --    ANIONGAP 6* 9 7* 7*   EGFRNONAA 13* 13* 22* 20*       Significant Imaging: I have reviewed all pertinent imaging results/findings within the past 24 hours.      Assessment/Plan:      * Acute renal failure superimposed on chronic kidney disease  Pt had 1 cycle of HD yesterday New HD  Follow renal panel and electrolytes closely.  Follow renal recommendations.  CT r/o renal stone  Adjust renal dose medications for creatinine clearance 10-50cc/min.  Avoid NSAIDs, De Leon-II inhibitors, ACE-I, Angiotensin Receptor Blockers, or Aminoglycosides.  Urine analysis.  Will defer following workup to nephro   JUAN A, C3, C4, ESR,  UPEP and SPEP  Check Urine Eosinophils.  Check  Urine Na, Cr, Protein.                Hypertensive emergency  Started on Cardene drip  Will add PO meds and titrate down the drip      Anemia  Patient's anemia is currently uncontrolled. Has recieved 2 units of PRBCs on 7/7. Etiology likely d/t Anemia of acute illness + Hemolytic anemia  Retic count elevated   Will cont to trend HnH   Current CBC reviewed-   Lab Results   Component Value Date    HGB 7.6 (L) 07/08/2022    HCT 21.3 (L) 07/08/2022     Monitor serial CBC and transfuse if patient becomes hemodynamically unstable, symptomatic or H/H drops below 7/21.         Pericardial effusion  Will repeat limited ECHO  Will consider cardiology consult      Seizure  Hx noted      Sickle cell trait  Hx noted        CKD (chronic kidney disease), stage IV  Hx noted      Anemia of chronic kidney failure  Hx noted  Will receive 2 units during HD will recheck Hnh after the 2 units      Type II diabetes mellitus     Patient's FSGs are controlled on current medication regimen.  Last A1c reviewed-   Lab Results   Component Value Date    HGBA1C 5.8 (H) 05/15/2022     Most recent fingerstick glucose reviewed-   Recent Labs   Lab 07/07/22  1640 07/08/22  0053 07/08/22  0525   POCTGLUCOSE 101 103 130*     Current correctional scale  Medium  Maintain anti-hyperglycemic dose as follows-   Antihyperglycemics (From admission, onward)            Start     Stop Route Frequency Ordered    07/07/22 1719  insulin aspart U-100 pen 1-10 Units         -- SubQ Before meals & nightly PRN 07/07/22 1619        Hold Oral hypoglycemics while patient is in the hospital.          VTE Risk Mitigation (From admission, onward)    None          Discharge Planning   TATIANA:      Code Status: Prior   Is the patient medically ready for discharge?:     Reason for patient still in hospital (select all that apply): Patient new problem, Patient trending condition and Treatment               Critical care time spent on  the evaluation and treatment of severe organ dysfunction, review of pertinent labs and imaging studies, discussions with consulting providers and discussions with patient/family: 60 minutes.      Keegan Cody MD  Department of Hospital Medicine   [facility]

## 2022-07-08 NOTE — NURSING
Laci called for continuing uncontrolled HTN despite NTG gtt. Pt's BP still remains in the 200's/115-120 on 250 mcg/min of nitro. Dr. Guerrero, [facility], placed orders for Nicardipine gtt. Will start Nicardipine gtt and titrate down NTG gtt per Dr. Guerrero's recommendation.

## 2022-07-08 NOTE — PLAN OF CARE
Plan of care reviewed w/ pt- verbalizes understanding. PIV and trialysis in place. Pt hypertensive despite HD and labetalol. Dr. Clark and NILS Parr, NP made aware. NTG gtt ordered. Max dose being given at 250 mcg/ min w/ little to no improvement. EICU called. Cardene gtt ordered. Cardene gtt at 2 mg to maintain systolic -180 and diastolic BP . Pt's BP responding well. Placed on 2L nasal cannula; SPO2 88%-89% when sleeping. Bladder scanned pt; 275 cc in bladder. Pt attempted to urinate in bed pan, but was unable to; states she has been having trouble urinating at home. Pelayo placed- 250 cc out upon placement. No BM this shift. Pt brought for CT of abdomen and pelvis. Complaints of abdomen and neck pain (where trialysis catheter is). Hydrocodone given x1 w/ moderate relief. Call light in reach, safety maintained.     Problem: Adult Inpatient Plan of Care  Goal: Plan of Care Review  Outcome: Ongoing, Progressing  Goal: Patient-Specific Goal (Individualized)  Outcome: Ongoing, Progressing  Goal: Absence of Hospital-Acquired Illness or Injury  Outcome: Ongoing, Progressing  Goal: Optimal Comfort and Wellbeing  Outcome: Ongoing, Progressing  Goal: Readiness for Transition of Care  Outcome: Ongoing, Progressing

## 2022-07-08 NOTE — ASSESSMENT & PLAN NOTE
Patient's anemia is currently uncontrolled. Has recieved 2 units of PRBCs on 7/7. Etiology likely d/t Anemia of acute illness + Hemolytic anemia  Retic count elevated   Will cont to trend HnH   Current CBC reviewed-   Lab Results   Component Value Date    HGB 7.6 (L) 07/08/2022    HCT 21.3 (L) 07/08/2022     Monitor serial CBC and transfuse if patient becomes hemodynamically unstable, symptomatic or H/H drops below 7/21.

## 2022-07-08 NOTE — ASSESSMENT & PLAN NOTE
Patient's FSGs are controlled on current medication regimen.  Last A1c reviewed-   Lab Results   Component Value Date    HGBA1C 5.8 (H) 05/15/2022     Most recent fingerstick glucose reviewed-   Recent Labs   Lab 07/07/22  1640 07/08/22  0053 07/08/22  0525   POCTGLUCOSE 101 103 130*     Current correctional scale  Medium  Maintain anti-hyperglycemic dose as follows-   Antihyperglycemics (From admission, onward)            Start     Stop Route Frequency Ordered    07/07/22 1719  insulin aspart U-100 pen 1-10 Units         -- SubQ Before meals & nightly PRN 07/07/22 1619        Hold Oral hypoglycemics while patient is in the hospital.

## 2022-07-09 LAB
ANION GAP SERPL CALC-SCNC: 8 MMOL/L (ref 8–16)
BASOPHILS # BLD AUTO: 0.02 K/UL (ref 0–0.2)
BASOPHILS # BLD AUTO: 0.03 K/UL (ref 0–0.2)
BASOPHILS NFR BLD: 0.4 % (ref 0–1.9)
BASOPHILS NFR BLD: 0.5 % (ref 0–1.9)
BUN SERPL-MCNC: 21 MG/DL (ref 6–20)
CALCIUM SERPL-MCNC: 7.8 MG/DL (ref 8.7–10.5)
CHLORIDE SERPL-SCNC: 107 MMOL/L (ref 95–110)
CO2 SERPL-SCNC: 22 MMOL/L (ref 23–29)
CREAT SERPL-MCNC: 2.5 MG/DL (ref 0.5–1.4)
DIFFERENTIAL METHOD: ABNORMAL
DIFFERENTIAL METHOD: ABNORMAL
EOSINOPHIL # BLD AUTO: 0.1 K/UL (ref 0–0.5)
EOSINOPHIL # BLD AUTO: 0.1 K/UL (ref 0–0.5)
EOSINOPHIL NFR BLD: 1.3 % (ref 0–8)
EOSINOPHIL NFR BLD: 3 % (ref 0–8)
ERYTHROCYTE [DISTWIDTH] IN BLOOD BY AUTOMATED COUNT: 15.1 % (ref 11.5–14.5)
ERYTHROCYTE [DISTWIDTH] IN BLOOD BY AUTOMATED COUNT: 15.3 % (ref 11.5–14.5)
EST. GFR  (AFRICAN AMERICAN): 28 ML/MIN/1.73 M^2
EST. GFR  (NON AFRICAN AMERICAN): 25 ML/MIN/1.73 M^2
GLUCOSE SERPL-MCNC: 187 MG/DL (ref 70–110)
HCT VFR BLD AUTO: 22.3 % (ref 37–48.5)
HCT VFR BLD AUTO: 23.5 % (ref 37–48.5)
HGB BLD-MCNC: 7.8 G/DL (ref 12–16)
HGB BLD-MCNC: 8.5 G/DL (ref 12–16)
IMM GRANULOCYTES # BLD AUTO: 0.01 K/UL (ref 0–0.04)
IMM GRANULOCYTES # BLD AUTO: 0.03 K/UL (ref 0–0.04)
IMM GRANULOCYTES NFR BLD AUTO: 0.2 % (ref 0–0.5)
IMM GRANULOCYTES NFR BLD AUTO: 0.4 % (ref 0–0.5)
LYMPHOCYTES # BLD AUTO: 0.5 K/UL (ref 1–4.8)
LYMPHOCYTES # BLD AUTO: 0.7 K/UL (ref 1–4.8)
LYMPHOCYTES NFR BLD: 12.4 % (ref 18–48)
LYMPHOCYTES NFR BLD: 8.3 % (ref 18–48)
MCH RBC QN AUTO: 28.9 PG (ref 27–31)
MCH RBC QN AUTO: 29.7 PG (ref 27–31)
MCHC RBC AUTO-ENTMCNC: 35 G/DL (ref 32–36)
MCHC RBC AUTO-ENTMCNC: 36.2 G/DL (ref 32–36)
MCV RBC AUTO: 82 FL (ref 82–98)
MCV RBC AUTO: 83 FL (ref 82–98)
MONOCYTES # BLD AUTO: 0.3 K/UL (ref 0.3–1)
MONOCYTES # BLD AUTO: 0.6 K/UL (ref 0.3–1)
MONOCYTES NFR BLD: 7.8 % (ref 4–15)
MONOCYTES NFR BLD: 8 % (ref 4–15)
NEUTROPHILS # BLD AUTO: 3.3 K/UL (ref 1.8–7.7)
NEUTROPHILS # BLD AUTO: 6.5 K/UL (ref 1.8–7.7)
NEUTROPHILS NFR BLD: 76.1 % (ref 38–73)
NEUTROPHILS NFR BLD: 81.6 % (ref 38–73)
NRBC BLD-RTO: 0 /100 WBC
NRBC BLD-RTO: 0 /100 WBC
PLATELET # BLD AUTO: 105 K/UL (ref 150–450)
PLATELET # BLD AUTO: 98 K/UL (ref 150–450)
PMV BLD AUTO: 10.1 FL (ref 9.2–12.9)
PMV BLD AUTO: 9 FL (ref 9.2–12.9)
POCT GLUCOSE: 129 MG/DL (ref 70–110)
POCT GLUCOSE: 136 MG/DL (ref 70–110)
POCT GLUCOSE: 176 MG/DL (ref 70–110)
POCT GLUCOSE: 185 MG/DL (ref 70–110)
POTASSIUM SERPL-SCNC: 4.1 MMOL/L (ref 3.5–5.1)
RBC # BLD AUTO: 2.7 M/UL (ref 4–5.4)
RBC # BLD AUTO: 2.86 M/UL (ref 4–5.4)
SODIUM SERPL-SCNC: 137 MMOL/L (ref 136–145)
WBC # BLD AUTO: 4.34 K/UL (ref 3.9–12.7)
WBC # BLD AUTO: 7.96 K/UL (ref 3.9–12.7)

## 2022-07-09 PROCEDURE — 36556 INSERT NON-TUNNEL CV CATH: CPT

## 2022-07-09 PROCEDURE — 80100014 HC HEMODIALYSIS 1:1

## 2022-07-09 PROCEDURE — 99233 SBSQ HOSP IP/OBS HIGH 50: CPT | Mod: ,,, | Performed by: INTERNAL MEDICINE

## 2022-07-09 PROCEDURE — 36556 PR INSERT NON-TUNNEL CV CATH 5+ YRS OLD: ICD-10-PCS | Mod: RT,,, | Performed by: SURGERY

## 2022-07-09 PROCEDURE — 99900035 HC TECH TIME PER 15 MIN (STAT)

## 2022-07-09 PROCEDURE — 63600175 PHARM REV CODE 636 W HCPCS: Performed by: INTERNAL MEDICINE

## 2022-07-09 PROCEDURE — 36415 COLL VENOUS BLD VENIPUNCTURE: CPT | Performed by: INTERNAL MEDICINE

## 2022-07-09 PROCEDURE — 25000003 PHARM REV CODE 250: Performed by: SURGERY

## 2022-07-09 PROCEDURE — 25000003 PHARM REV CODE 250: Performed by: NURSE PRACTITIONER

## 2022-07-09 PROCEDURE — 27000923 HC TRIALYSIS CATHETER, ANY SIZE

## 2022-07-09 PROCEDURE — 94761 N-INVAS EAR/PLS OXIMETRY MLT: CPT

## 2022-07-09 PROCEDURE — 27000221 HC OXYGEN, UP TO 24 HOURS

## 2022-07-09 PROCEDURE — 25000003 PHARM REV CODE 250: Performed by: INTERNAL MEDICINE

## 2022-07-09 PROCEDURE — 20000000 HC ICU ROOM

## 2022-07-09 PROCEDURE — 63600175 PHARM REV CODE 636 W HCPCS: Performed by: SURGERY

## 2022-07-09 PROCEDURE — 36556 INSERT NON-TUNNEL CV CATH: CPT | Mod: RT,,, | Performed by: SURGERY

## 2022-07-09 PROCEDURE — 80048 BASIC METABOLIC PNL TOTAL CA: CPT | Performed by: INTERNAL MEDICINE

## 2022-07-09 PROCEDURE — 85025 COMPLETE CBC W/AUTO DIFF WBC: CPT | Mod: 91 | Performed by: INTERNAL MEDICINE

## 2022-07-09 PROCEDURE — 99233 PR SUBSEQUENT HOSPITAL CARE,LEVL III: ICD-10-PCS | Mod: ,,, | Performed by: INTERNAL MEDICINE

## 2022-07-09 RX ORDER — LIDOCAINE HYDROCHLORIDE 10 MG/ML
1 INJECTION, SOLUTION EPIDURAL; INFILTRATION; INTRACAUDAL; PERINEURAL ONCE
Status: DISCONTINUED | OUTPATIENT
Start: 2022-07-09 | End: 2022-07-09 | Stop reason: SDUPTHER

## 2022-07-09 RX ORDER — ISOSORBIDE MONONITRATE 30 MG/1
60 TABLET, EXTENDED RELEASE ORAL DAILY
Status: DISCONTINUED | OUTPATIENT
Start: 2022-07-09 | End: 2022-07-16

## 2022-07-09 RX ORDER — HYDROMORPHONE HYDROCHLORIDE 1 MG/ML
1 INJECTION, SOLUTION INTRAMUSCULAR; INTRAVENOUS; SUBCUTANEOUS ONCE
Status: COMPLETED | OUTPATIENT
Start: 2022-07-09 | End: 2022-07-09

## 2022-07-09 RX ORDER — HYDROCODONE BITARTRATE AND ACETAMINOPHEN 5; 325 MG/1; MG/1
1 TABLET ORAL EVERY 6 HOURS PRN
Status: DISCONTINUED | OUTPATIENT
Start: 2022-07-09 | End: 2022-07-17 | Stop reason: HOSPADM

## 2022-07-09 RX ORDER — LEVETIRACETAM 500 MG/1
500 TABLET ORAL 2 TIMES DAILY
Status: DISCONTINUED | OUTPATIENT
Start: 2022-07-09 | End: 2022-07-17 | Stop reason: HOSPADM

## 2022-07-09 RX ORDER — LIDOCAINE HYDROCHLORIDE 10 MG/ML
1 INJECTION, SOLUTION EPIDURAL; INFILTRATION; INTRACAUDAL; PERINEURAL ONCE
Status: COMPLETED | OUTPATIENT
Start: 2022-07-09 | End: 2022-07-09

## 2022-07-09 RX ORDER — MORPHINE SULFATE 2 MG/ML
2 INJECTION, SOLUTION INTRAMUSCULAR; INTRAVENOUS ONCE
Status: COMPLETED | OUTPATIENT
Start: 2022-07-09 | End: 2022-07-09

## 2022-07-09 RX ORDER — FAMOTIDINE 20 MG/1
20 TABLET, FILM COATED ORAL DAILY
Status: DISCONTINUED | OUTPATIENT
Start: 2022-07-09 | End: 2022-07-17 | Stop reason: HOSPADM

## 2022-07-09 RX ORDER — HEPARIN SODIUM 1000 [USP'U]/ML
4000 INJECTION, SOLUTION INTRAVENOUS; SUBCUTANEOUS
Status: DISCONTINUED | OUTPATIENT
Start: 2022-07-09 | End: 2022-07-17 | Stop reason: HOSPADM

## 2022-07-09 RX ORDER — MORPHINE SULFATE 2 MG/ML
2 INJECTION, SOLUTION INTRAMUSCULAR; INTRAVENOUS
Status: DISCONTINUED | OUTPATIENT
Start: 2022-07-09 | End: 2022-07-09

## 2022-07-09 RX ORDER — HYDROMORPHONE HYDROCHLORIDE 1 MG/ML
0.5 INJECTION, SOLUTION INTRAMUSCULAR; INTRAVENOUS; SUBCUTANEOUS ONCE
Status: COMPLETED | OUTPATIENT
Start: 2022-07-09 | End: 2022-07-09

## 2022-07-09 RX ORDER — AMLODIPINE BESYLATE 5 MG/1
5 TABLET ORAL DAILY
Status: DISCONTINUED | OUTPATIENT
Start: 2022-07-09 | End: 2022-07-11

## 2022-07-09 RX ADMIN — LEVETIRACETAM 500 MG: 500 TABLET, FILM COATED ORAL at 11:07

## 2022-07-09 RX ADMIN — NICARDIPINE HYDROCHLORIDE 2 MG/HR: 0.2 INJECTION, SOLUTION INTRAVENOUS at 07:07

## 2022-07-09 RX ADMIN — LIDOCAINE HYDROCHLORIDE 10 MG: 10 INJECTION, SOLUTION EPIDURAL; INFILTRATION; INTRACAUDAL; PERINEURAL at 06:07

## 2022-07-09 RX ADMIN — NICARDIPINE HYDROCHLORIDE 4 MG/HR: 0.2 INJECTION, SOLUTION INTRAVENOUS at 04:07

## 2022-07-09 RX ADMIN — ACETAMINOPHEN AND CODEINE PHOSPHATE 1 TABLET: 300; 30 TABLET ORAL at 08:07

## 2022-07-09 RX ADMIN — MUPIROCIN: 20 OINTMENT TOPICAL at 08:07

## 2022-07-09 RX ADMIN — HYDROMORPHONE HYDROCHLORIDE 1 MG: 1 INJECTION, SOLUTION INTRAMUSCULAR; INTRAVENOUS; SUBCUTANEOUS at 06:07

## 2022-07-09 RX ADMIN — INSULIN ASPART 2 UNITS: 100 INJECTION, SOLUTION INTRAVENOUS; SUBCUTANEOUS at 08:07

## 2022-07-09 RX ADMIN — HEPARIN SODIUM 4000 UNITS: 1000 INJECTION INTRAVENOUS; SUBCUTANEOUS at 08:07

## 2022-07-09 RX ADMIN — MORPHINE SULFATE 2 MG: 2 INJECTION, SOLUTION INTRAMUSCULAR; INTRAVENOUS at 01:07

## 2022-07-09 RX ADMIN — FAMOTIDINE 20 MG: 20 TABLET ORAL at 11:07

## 2022-07-09 RX ADMIN — HYDROMORPHONE HYDROCHLORIDE 0.5 MG: 1 INJECTION, SOLUTION INTRAMUSCULAR; INTRAVENOUS; SUBCUTANEOUS at 07:07

## 2022-07-09 RX ADMIN — ISOSORBIDE MONONITRATE 60 MG: 60 TABLET, EXTENDED RELEASE ORAL at 11:07

## 2022-07-09 RX ADMIN — LEVETIRACETAM 500 MG: 500 TABLET, FILM COATED ORAL at 09:07

## 2022-07-09 RX ADMIN — AMLODIPINE BESYLATE 5 MG: 5 TABLET ORAL at 11:07

## 2022-07-09 NOTE — NURSING
Unable to establish PIV access at this time. Notified Glo SPRINGER of patient without IV access. MD stated okay without access as Nakia SPRINGER is in route to replace trialysis line.    Dialysis RN arrived to bedside and this RN updated her on access issues and events.

## 2022-07-09 NOTE — PLAN OF CARE
CICU DAILY GOALS       A: Awake    RASS: Goal - Alert and calm  Actual - Alert and calm    Restraint necessity: none   B: Breath   SBT: N/A  C: Coordinate A & B, analgesics/sedatives   Pain:Managed with oral pain meds   SAT: N/A  D: Delirium   CAM-ICU: Overall CAM-ICU: Negative  E: Early(intubated/ Progressive (non-intubated) Mobility   MOVE Screen: N/A   Activity: Activity Management: Rolling - L1  FAS: Feeding/Nutrition   Diet order: Diet/Nutrition Received: consistent carb/diabetic diet, Specialty Diet/Nutrition Received: renal diet Fluid restriction: Fluid Requirement: 1.5L restriction  T: Thrombus   DVT prophylaxis: none ordered   H: HOB Elevation   Head of Bed (HOB) Positioning: HOB at 30-45 degrees  U: Ulcer Prophylaxis   GI: None ordered  G: Glucose control   Managed Glycemic Management: blood glucose monitored Sliding scale Insulin  S: Skin   Bundle compliance: yes  Bathing/Skin Care: back care, bath, complete, dressed/undressed, linen changed Date: 7/9/22  B: Bowel Function   none  I: Indwelling Catheters   Pelayo necessity:      Urethral Catheter 07/07/22 2330 Non-latex 16 Fr.-Reason for Continuing Urinary Catheterization: Urinary retention   CVC necessity:Yes dialysis   IPAD offered: Patient has Iphone  D: De-escalation Antibx   yes  Plan for the day   Replace Trialysis catheter. Monitor B/P wean Nicardipine as tolerated  Family/Goals of care/Code Status   Code Status: Prior     No acute events throughout day, VS and assessment per flow sheet, patient progressing towards goals as tolerated, plan of care reviewed with Tabby Howard and family, all concerns addressed, will continue to monitor. a

## 2022-07-09 NOTE — ASSESSMENT & PLAN NOTE
Patient's FSGs are controlled on current medication regimen.  Last A1c reviewed-   Lab Results   Component Value Date    HGBA1C 5.8 (H) 05/15/2022     Most recent fingerstick glucose reviewed-   Recent Labs   Lab 07/08/22 2032 07/09/22  0800   POCTGLUCOSE 176* 185*     Current correctional scale  Medium  Maintain anti-hyperglycemic dose as follows-   Antihyperglycemics (From admission, onward)            Start     Stop Route Frequency Ordered    07/07/22 1719  insulin aspart U-100 pen 1-10 Units         -- SubQ Before meals & nightly PRN 07/07/22 1619        Hold Oral hypoglycemics while patient is in the hospital.

## 2022-07-09 NOTE — HOSPITAL COURSE
Patient was admitted for acute on chronic kidney disease with hyperkalemia and acidosis.  Patient was also found to be in hypertensive emergency.  On arrival patient was anemic Hb 4. Patient was transfused 2 units of blood with her new dialysis.  On arrival K 6.4, CO2 15. Nephrology was consulted patient was started on hemodialysis after dialysis access was placed.  For blood pressure control started NTG gtt and when blood pressure was not controlled with nitro drip patient was switched to nicardipine gtt patient continued to be oliguric, hemoglobin improved after cut transition, hematology was consulted for further evaluation of anemia.  Workup showed hemolytic anemia which is attributed hemolysis during hypertensive emergency.  Patient's potassium improved acidosis improved after hemodialysis.  Tunneled catheter was placed 7/12. She was started on IV steroid by hematology, after which insulin had to be increased for hyperglycemia. Patient continued to improve and was medically stable for discharge

## 2022-07-09 NOTE — PROGRESS NOTES
Pharmacist Renal Dose Adjustment Note    Tabby Howard is a 32 y.o. female being treated with the medication Famotidine    Patient Data:    Vital Signs (Most Recent):  Temp: 98.3 °F (36.8 °C) (07/09/22 0800)  Pulse: 92 (07/09/22 1000)  Resp: (!) 8 (07/09/22 1000)  BP: (!) 150/103 (07/09/22 1000)  SpO2: 95 % (07/09/22 1000)   Vital Signs (72h Range):  Temp:  [96.9 °F (36.1 °C)-98.5 °F (36.9 °C)]   Pulse:  []   Resp:  [8-50]   BP: (128-231)/()   SpO2:  [85 %-100 %]      Recent Labs   Lab 07/08/22  0028 07/08/22  0516 07/09/22  0231   CREATININE 2.7* 2.9*  2.9* 2.5*     Serum creatinine: 2.5 mg/dL (H) 07/09/22 0231  Estimated creatinine clearance: 31.2 mL/min (A)    Famotidine therapy for Tabby Howard [id number] has been evaluated according to the pharmacy practice protocol.      Based on the patient's Estimated Creatinine Clearance: 31.2 mL/min (A) (based on SCr of 2.5 mg/dL (H))., famotidine therapy has been adjusted to 20 mg once daily.    Thank you,  Ninfa Wlid

## 2022-07-09 NOTE — ASSESSMENT & PLAN NOTE
Patient's anemia is currently controlled. Has recieved 2 units of PRBCs on 7/7. Etiology likely d/t Anemia of acute illness + Hemolytic anemia  Retic count elevated   Will cont to trend HnH   Current CBC reviewed-   Lab Results   Component Value Date    HGB 7.8 (L) 07/09/2022    HCT 22.3 (L) 07/09/2022     Monitor serial CBC and transfuse if patient becomes hemodynamically unstable, symptomatic or H/H drops below 7/21.

## 2022-07-09 NOTE — ASSESSMENT & PLAN NOTE
Hx noted  Ml 2/2  hypertensive emergency and acute illness   S/p  2 units   Patient's anemia is currently controlled. Has recieved 2 units of PRBCs on 7/7.   Current CBC reviewed-   Lab Results   Component Value Date    HGB 7.8 (L) 07/09/2022    HCT 22.3 (L) 07/09/2022     Monitor serial CBC and transfuse if patient becomes hemodynamically unstable, symptomatic or H/H drops below 7/21.

## 2022-07-09 NOTE — ASSESSMENT & PLAN NOTE
ECHO shows · Moderate circumferential pericardial effusion.     The left ventricle is normal in size with moderate concentric hypertrophy and low normal systolic function.  · The estimated ejection fraction is 50%.  · Normal left ventricular diastolic function.  · Normal right ventricular size with normal right ventricular systolic function.

## 2022-07-09 NOTE — PROGRESS NOTES
2500 ml net uf removed   07/08/22 2015   Handoff Report   Received From Dahlia   Given To Sarah   Vital Signs   Temp 97.2 °F (36.2 °C)   Pulse 105   Resp 18   SpO2 100 %   BP (!) 159/101   Assessments (Pre/Post)   Date Hepatitis Profile Obtained 07/07/22   Blood Liters Processed (BLP) 48   Transport Modality not applicable   Level of Consciousness (AVPU) alert   Dialyzer Clearance mildly streaked   Pain   Preferred Pain Scale number (Numeric Rating Pain Scale)   Pain Rating (0-10): Rest 0   Trialysis (Dialysis) Catheter 07/07/22 1553 right internal jugular   Placement Date/Time: 07/07/22 1553   Present Prior to Hospital Arrival?: No  Location: right internal jugular  Pressure Injectable Catheter: Yes  Placement Verification: X-ray   Site Assessment No drainage;No redness;No swelling;No warmth   Line Securement Device Secured with sutures   Dressing Type Central line dressing;Biopatch in place   Dressing Status Clean;Dry;Intact   Dressing Intervention Integrity maintained   Date on Dressing 07/08/22   Dressing Due to be Changed 07/15/22   Venous Patency/Care heparin locked   Arterial Patency/Care heparin locked   Flows Good   Line Necessity Review CRRT/HD   Post-Hemodialysis Assessment   Rinseback Volume (mL) 250 mL   Blood Volume Processed (Liters) 48 L   Dialyzer Clearance Lightly streaked   Duration of Treatment 180 minutes   Additional Fluid Intake (mL) 500 mL   Total UF (mL) 3000 mL   Net Fluid Removal 2500   Patient Response to Treatment tolerated well   Post-Treatment Weight 79.6 kg (175 lb 7.8 oz)   Treatment Weight Change -2.5   Post-Hemodialysis Comments tx completed   Educated on renal diet

## 2022-07-09 NOTE — CONSULTS
Called to eval trialysis catheter, appears to be in place was used yesterday for dialysis.  CXR shows it remain in vena cava.      Patient very hesitant to even manipulate catheter secondary to pain, but catheter in place and secured with sutures to device.  Appears to be at 11-13 cm.    Patient very anxious to manipulate or move catheter.    Catheter usable - would continue to use it for HD for now and I will check on it tomorrow. Optimally would prefer to do while patient sedated, if needs tunneled catheter might be best to arrange for Monday, to minimize procedures for patient.

## 2022-07-09 NOTE — NURSING
Notified Nakia SPRINGER with general surgery regarding consult for trialysis line replacement today.

## 2022-07-09 NOTE — SUBJECTIVE & OBJECTIVE
Interval History: On cardene drip. No acute events over Hgb improving, 7.8,   K+ within normal limit No complaints.  Dialysis catheter to be replaced today, scheduled for HD today    Review of Systems   Unable to perform ROS: Acuity of condition   Constitutional:  Positive for fatigue. Negative for chills.   HENT:  Negative for sore throat.    Respiratory:  Negative for shortness of breath.    Gastrointestinal:  Negative for abdominal pain.   Genitourinary:  Negative for decreased urine volume.   Musculoskeletal:  Positive for back pain.   Skin:  Positive for pallor.   Allergic/Immunologic: Positive for immunocompromised state.   Neurological:  Positive for weakness. Negative for seizures and headaches.   Psychiatric/Behavioral:  Negative for behavioral problems.    Objective:     Vital Signs (Most Recent):  Temp: 98.3 °F (36.8 °C) (07/09/22 0800)  Pulse: 92 (07/09/22 1000)  Resp: (!) 8 (07/09/22 1000)  BP: (!) 150/103 (07/09/22 1000)  SpO2: 95 % (07/09/22 1000)   Vital Signs (24h Range):  Temp:  [97 °F (36.1 °C)-98.3 °F (36.8 °C)] 98.3 °F (36.8 °C)  Pulse:  [] 92  Resp:  [8-50] 8  SpO2:  [91 %-100 %] 95 %  BP: (128-196)/() 150/103     Weight: 79.2 kg (174 lb 9.7 oz)  Body mass index is 31.94 kg/m².    Intake/Output Summary (Last 24 hours) at 7/9/2022 1041  Last data filed at 7/9/2022 1025  Gross per 24 hour   Intake 1308.76 ml   Output 3230 ml   Net -1921.24 ml      Physical Exam  Vitals and nursing note reviewed.   Constitutional:       General: She is not in acute distress.     Appearance: She is well-developed. She is ill-appearing. She is not diaphoretic.      Comments: Anasarca.   HENT:      Head: Normocephalic and atraumatic.      Right Ear: External ear normal.      Left Ear: External ear normal.      Nose: Nose normal.      Mouth/Throat:      Mouth: Mucous membranes are moist.   Eyes:      General: No scleral icterus.     Extraocular Movements: Extraocular movements intact.       Conjunctiva/sclera: Conjunctivae normal.      Pupils: Pupils are equal, round, and reactive to light.   Neck:      Vascular: No JVD.   Cardiovascular:      Rate and Rhythm: Normal rate and regular rhythm.      Pulses: Normal pulses.      Heart sounds: Normal heart sounds. No murmur heard.    No friction rub. No gallop.   Pulmonary:      Effort: Pulmonary effort is normal. No respiratory distress.      Breath sounds: Normal breath sounds. No wheezing or rales.   Abdominal:      General: Bowel sounds are normal. There is no distension.      Palpations: Abdomen is soft.      Tenderness: There is no abdominal tenderness. There is no guarding or rebound.   Genitourinary:     Comments: Pelayo.  Musculoskeletal:         General: No tenderness. Normal range of motion.      Cervical back: Neck supple.      Right lower leg: Edema present.      Left lower leg: Edema present.   Skin:     General: Skin is warm and dry.      Coloration: Skin is not pale.      Findings: No erythema or rash.   Neurological:      Mental Status: She is oriented to person, place, and time.      Cranial Nerves: No cranial nerve deficit.      Motor: No weakness.   Psychiatric:         Mood and Affect: Mood normal.         Behavior: Behavior normal.       Significant Labs: All pertinent labs within the past 24 hours have been reviewed.  CBC:   Recent Labs   Lab 07/08/22  0028 07/08/22  0516 07/09/22  0231   WBC 5.39 5.36 4.34   HGB 7.2* 7.6* 7.8*   HCT 20.3* 21.3* 22.3*   PLT 79* 94* 98*     CMP:   Recent Labs   Lab 07/08/22  0028 07/08/22  0516 07/08/22  1517 07/09/22  0231    137  137  --  137   K 4.3 4.8  4.8  --  4.1   * 110  110  --  107   CO2 20* 20*  20*  --  22*   GLU 99 114*  114*  --  187*   BUN 30* 31*  31*  --  21*   CREATININE 2.7* 2.9*  2.9*  --  2.5*   CALCIUM 7.9* 8.0*  8.0*  --  7.8*   PROT  --  4.9* 5.1*  --    ALBUMIN  --  2.0* 2.0*  --    BILITOT  --  2.1* 1.2*  --    ALKPHOS  --  76 86  --    AST  --  17 17  --     ALT  --  16 15  --    ANIONGAP 7* 7*  7*  --  8   EGFRNONAA 22* 20*  20*  --  25*       Significant Imaging: I have reviewed all pertinent imaging results/findings within the past 24 hours.

## 2022-07-09 NOTE — NURSING
Nakia SPRINGER and Henry SPRINGER gave malick to use current dialysis catheter. This RN notified dialysis center of malick to use line and proceed with dialysis today per order.

## 2022-07-09 NOTE — SIGNIFICANT EVENT
Patient napping during 1600 round, cardene infusing, trialysis line and dressing appears clean/dry/intact at this time. This RN woke patient and patient stated she wanted to rest at this time.    Patient pressed call light at 1628 notifying staff of bed feeling wet. This RN went to the bedside to assess and found patient's bed/gown/neck with blood present. I applied manual pressure to trialysis site and Charge RN Devi assisted with changing trialysis dressing--quick clot and gauze dressing applied. Sutures noted to be intact during dressing change and scant bleeding continuing at insertion site. Patient gown and linen changed as all had significant blood present. Skin care provided as needed. Patient tearful; staff provided reassurance and emotional support. Cardene infusion was stopped during this, as unsure if trialysis in correct position and patient has no other access at this time.    Notified Glo SPRINGER of event and stat chest xray and cbc ordered.    Chest xray completed and this RN assessed the trialysis line to no longer have blood return on aspiration. Lab notified of needing CBC collected.    Notified Nakia SPRINGER who reviewed the chest xray and ordered to have trialysis insertion supplies ready at the bedside.    Updated the patient on plan of care and she became tearful again. Emotional support provided.

## 2022-07-09 NOTE — PROGRESS NOTES
"[facility] Medicine  Progress Note    Patient Name: Tabby Howard  MRN: [id number]  Patient Class: IP- Inpatient   Admission Date: 2022  Length of Stay: 2 days  Attending Physician: Keegan Cody MD  Primary Care Provider: Primary Doctor No        Subjective:     Principal Problem:Acute renal failure superimposed on chronic kidney disease        HPI:  Tabby Howard is a 32 y.o. female with a PMHx of DM II, supraventricular tachycardia, heart failure with preserved ejection fraction (22), Sickle cell trait, and CKD (stage IV) who states that she came to the ED because of abnormal labs. She has been having shortness of breathness, feeling lethargic and generalized weakness for few weeks but has been feeling worse.  Patient had lab work done this morning and was informed of abnormal "kidney" results with referral to the ED for further evaluation and work up.  She endorses diffuse back pain over the past month, intermittent chills as well as diarrhea a few days ago that has since improved.  Patient specifies she is able to urinate but notes that the volume has decreased.  Also states that her  leg swelling has been getting worse due to her being "full of fluid."  LMP was in 2021.  The patient denies fever, N/V, abdominal pain on exam, CP or any other symptoms at this time.  PSHx includes EGD and .  Does not see a nephrologist for her CKD     The history is provided by the patient.          Overview/Hospital Course:  No notes on file    Interval History: No acute events over Hgb improving, 7.8,   K+ within normal limit No complaints.  Dialysis catheter to be replaced today, scheduled for HD today    Review of Systems   Unable to perform ROS: Acuity of condition   Constitutional:  Positive for fatigue. Negative for chills.   HENT:  Negative for sore throat.    Respiratory:  Negative for shortness of breath.    Gastrointestinal:  Negative for abdominal pain. "   Genitourinary:  Negative for decreased urine volume.   Musculoskeletal:  Positive for back pain.   Skin:  Positive for pallor.   Allergic/Immunologic: Positive for immunocompromised state.   Neurological:  Positive for weakness. Negative for seizures and headaches.   Psychiatric/Behavioral:  Negative for behavioral problems.    Objective:     Vital Signs (Most Recent):  Temp: 98.3 °F (36.8 °C) (07/09/22 0800)  Pulse: 92 (07/09/22 1000)  Resp: (!) 8 (07/09/22 1000)  BP: (!) 150/103 (07/09/22 1000)  SpO2: 95 % (07/09/22 1000)   Vital Signs (24h Range):  Temp:  [97 °F (36.1 °C)-98.3 °F (36.8 °C)] 98.3 °F (36.8 °C)  Pulse:  [] 92  Resp:  [8-50] 8  SpO2:  [91 %-100 %] 95 %  BP: (128-196)/() 150/103     Weight: 79.2 kg (174 lb 9.7 oz)  Body mass index is 31.94 kg/m².    Intake/Output Summary (Last 24 hours) at 7/9/2022 1041  Last data filed at 7/9/2022 1025  Gross per 24 hour   Intake 1308.76 ml   Output 3230 ml   Net -1921.24 ml      Physical Exam  Vitals and nursing note reviewed.   Constitutional:       General: She is not in acute distress.     Appearance: She is well-developed. She is ill-appearing. She is not diaphoretic.      Comments: Anasarca.   HENT:      Head: Normocephalic and atraumatic.      Right Ear: External ear normal.      Left Ear: External ear normal.      Nose: Nose normal.      Mouth/Throat:      Mouth: Mucous membranes are moist.   Eyes:      General: No scleral icterus.     Extraocular Movements: Extraocular movements intact.      Conjunctiva/sclera: Conjunctivae normal.      Pupils: Pupils are equal, round, and reactive to light.   Neck:      Vascular: No JVD.   Cardiovascular:      Rate and Rhythm: Normal rate and regular rhythm.      Pulses: Normal pulses.      Heart sounds: Normal heart sounds. No murmur heard.    No friction rub. No gallop.   Pulmonary:      Effort: Pulmonary effort is normal. No respiratory distress.      Breath sounds: Normal breath sounds. No wheezing or  rales.   Abdominal:      General: Bowel sounds are normal. There is no distension.      Palpations: Abdomen is soft.      Tenderness: There is no abdominal tenderness. There is no guarding or rebound.   Genitourinary:     Comments: Pelayo.  Musculoskeletal:         General: No tenderness. Normal range of motion.      Cervical back: Neck supple.      Right lower leg: Edema present.      Left lower leg: Edema present.   Skin:     General: Skin is warm and dry.      Coloration: Skin is not pale.      Findings: No erythema or rash.   Neurological:      Mental Status: She is oriented to person, place, and time.      Cranial Nerves: No cranial nerve deficit.      Motor: No weakness.   Psychiatric:         Mood and Affect: Mood normal.         Behavior: Behavior normal.       Significant Labs: All pertinent labs within the past 24 hours have been reviewed.  CBC:   Recent Labs   Lab 07/08/22  0028 07/08/22  0516 07/09/22  0231   WBC 5.39 5.36 4.34   HGB 7.2* 7.6* 7.8*   HCT 20.3* 21.3* 22.3*   PLT 79* 94* 98*     CMP:   Recent Labs   Lab 07/08/22  0028 07/08/22  0516 07/08/22  1517 07/09/22  0231    137  137  --  137   K 4.3 4.8  4.8  --  4.1   * 110  110  --  107   CO2 20* 20*  20*  --  22*   GLU 99 114*  114*  --  187*   BUN 30* 31*  31*  --  21*   CREATININE 2.7* 2.9*  2.9*  --  2.5*   CALCIUM 7.9* 8.0*  8.0*  --  7.8*   PROT  --  4.9* 5.1*  --    ALBUMIN  --  2.0* 2.0*  --    BILITOT  --  2.1* 1.2*  --    ALKPHOS  --  76 86  --    AST  --  17 17  --    ALT  --  16 15  --    ANIONGAP 7* 7*  7*  --  8   EGFRNONAA 22* 20*  20*  --  25*       Significant Imaging: I have reviewed all pertinent imaging results/findings within the past 24 hours.      Assessment/Plan:      * Acute renal failure superimposed on chronic kidney disease  Pt had 2 cycles of HD   Scheduled for another cycle today  New HD  Follow renal panel and electrolytes closely.  Follow renal recommendations.  CT r/o renal stone  Adjust renal  dose medications for creatinine clearance 10-50cc/min.  Avoid NSAIDs, De Leon-II inhibitors, ACE-I, Angiotensin Receptor Blockers, or Aminoglycosides.  Urine analysis.  Will defer following workup to nephro   JUAN A, C3, C4, ESR, UPEP and SPEP                  Hypertensive emergency  Off  Cardene drip  Started on amlodipine and isosorbide mononitrate      Anemia  Patient's anemia is currently controlled. Has recieved 2 units of PRBCs on 7/7. Etiology likely d/t Anemia of acute illness + Hemolytic anemia  Retic count elevated   Will cont to trend HnH   Current CBC reviewed-   Lab Results   Component Value Date    HGB 7.8 (L) 07/09/2022    HCT 22.3 (L) 07/09/2022     Monitor serial CBC and transfuse if patient becomes hemodynamically unstable, symptomatic or H/H drops below 7/21.         Pericardial effusion  ECHO shows · Moderate circumferential pericardial effusion.     The left ventricle is normal in size with moderate concentric hypertrophy and low normal systolic function.  · The estimated ejection fraction is 50%.  · Normal left ventricular diastolic function.  · Normal right ventricular size with normal right ventricular systolic function.          Seizure  Hx noted      Sickle cell trait  Hx noted        CKD (chronic kidney disease), stage IV  Hx noted      Anemia of chronic kidney failure  Hx noted  Ml 2/2  hypertensive emergency and acute illness   S/p  2 units   Patient's anemia is currently controlled. Has recieved 2 units of PRBCs on 7/7.   Current CBC reviewed-   Lab Results   Component Value Date    HGB 7.8 (L) 07/09/2022    HCT 22.3 (L) 07/09/2022     Monitor serial CBC and transfuse if patient becomes hemodynamically unstable, symptomatic or H/H drops below 7/21.         Type II diabetes mellitus     Patient's FSGs are controlled on current medication regimen.  Last A1c reviewed-   Lab Results   Component Value Date    HGBA1C 5.8 (H) 05/15/2022     Most recent fingerstick glucose reviewed-   Recent Labs    Lab 07/08/22 2032 07/09/22  0800   POCTGLUCOSE 176* 185*     Current correctional scale  Medium  Maintain anti-hyperglycemic dose as follows-   Antihyperglycemics (From admission, onward)            Start     Stop Route Frequency Ordered    07/07/22 1719  insulin aspart U-100 pen 1-10 Units         -- SubQ Before meals & nightly PRN 07/07/22 1619        Hold Oral hypoglycemics while patient is in the hospital.          VTE Risk Mitigation (From admission, onward)         Ordered     heparin (porcine) injection 4,000 Units  Once         07/08/22 1624                Discharge Planning   TATINAA: 7/11/2022     Code Status: Prior   Is the patient medically ready for discharge?:     Reason for patient still in hospital (select all that apply): Patient trending condition and Treatment  Discharge Plan A: Home with family            Critical care time spent on the evaluation and treatment of severe organ dysfunction, review of pertinent labs and imaging studies, discussions with consulting providers and discussions with patient/family: 60 minutes.      Keegan Cody MD  Department of Hospital Medicine   [facility]

## 2022-07-09 NOTE — ASSESSMENT & PLAN NOTE
Pt had 2 cycles of HD   Scheduled for another cycle today  New HD  Follow renal panel and electrolytes closely.  Follow renal recommendations.  CT r/o renal stone  Adjust renal dose medications for creatinine clearance 10-50cc/min.  Avoid NSAIDs, De Leon-II inhibitors, ACE-I, Angiotensin Receptor Blockers, or Aminoglycosides.  Urine analysis.  Will defer following workup to nephro   JUAN A, C3, C4, ESR, UPEP and SPEP

## 2022-07-09 NOTE — PROGRESS NOTES
"INPATIENT NEPHROLOGY PROGRESS  [facility]    Patient Name: Tabby Howard  Date: 2022    Reason for consultation: KANWAL    Chief Complaint:   Chief Complaint   Patient presents with    Abnormal Lab       History of Present Illness:  Tabby Howard is a 32 y.o. female with a PMHx of DM II, supraventricular tachycardia, heart failure with preserved ejection fraction (22), Sickle cell trait, and CKD (stage IV) who states that she came to the ED because of abnormal labs. She has been having shortness of breathness, feeling lethargic and generalized weakness for few weeks but has been feeling worse.  Patient had lab work done this morning and was informed of abnormal "kidney" results with referral to the ED for further evaluation and work up.  She endorses diffuse back pain over the past month, intermittent chills as well as diarrhea a few days ago that has since improved.  Patient specifies she is able to urinate but notes that the volume has decreased.  Also states that her  leg swelling has been getting worse due to her being "full of fluid."  LMP was in 2021.  The patient denies fever, N/V, abdominal pain on exam, CP or any other symptoms at this time.  PSHx includes EGD and . She was seen by our group during her last hospitalization and was scheduled for f/u with Dr. BATRES next week- previsit labs showed severe anemia and metabolic derangements so she was sent into the hospital.      JUAN A neg, UA with 3+ protein, 1+ blood  - UPCR 10.8    Admit CT AP:  Interval development of right pleural effusion.  Stable prominent pericardial effusion.  Interstitial prominence raising question of pulmonary edema.  Stable abdomen and pelvis with ascites, anasarca.    Interval History:  - hypertensive emergency, Hb 4, K 6.4, CO2 15 in the ER- spoke with ER physician and advised trialysis cath placement for emergent HD with blood transfusion- did HD overnight, started NTG gtt for " BP control- switched to nicardipine gtt overnight- got 2u of blood- washington placed- oliguric, BP better, clearance parameters are better  7/9  Hgb improving, 7.8, post transfusion.  K+ at goal. Appreciate heme-onc input.  Pt fatigued, not conversing much.  No complaints.  Dialysis catheter to be replaced today, pt to have HD following.    Plan of Care:    Assessment:  KANWAL/CKD V- likely now ESRD- history of uncontrolled DMII  Nephrotic syndrome with ansaraca; History of microscopic hematuria  HTN emergency/D CHF/Acut pulm edema/Pericardial effusion/Pleural effusion  Hyperkalemia/Acidosis  SHPT  Hemolytic anemia/Thrombocytopenia; likely component of Anemia of CKD as well    Plan:    - Initiated RRT on 7/7- plan for HD/UF 7/8, 7/9 as well. Anticipate discharge with dialysis with outpatient renal biopsy. Continue washington for now.  - On cardene gtt- aim for BP ~150/90- once we get more fluid off, will start to add oral BP medication and diuretics. Renal diet with a 1.5L fluid restriction.   - Will f/u echo.  - Check phos, PTH--acceptable  - Hb is improved after 2u of blood- heme/onc following for assistance- r/o TTP.    Thank you for allowing us to participate in this patient's care. We will continue to follow.    Vital Signs:  Temp Readings from Last 3 Encounters:   07/09/22 98.1 °F (36.7 °C) (Temporal)   06/11/22 98.2 °F (36.8 °C) (Oral)   06/07/22 98.1 °F (36.7 °C) (Axillary)       Pulse Readings from Last 3 Encounters:   07/09/22 101   06/11/22 100   06/07/22 110       BP Readings from Last 3 Encounters:   07/09/22 128/78   06/11/22 (!) 173/105   06/07/22 129/81       Weight:  Wt Readings from Last 3 Encounters:   07/09/22 79.2 kg (174 lb 9.7 oz)   06/11/22 73.9 kg (163 lb)   06/07/22 74.2 kg (163 lb 9.3 oz)       INS/OUTS:  I/O last 3 completed shifts:  In: 3024.5 [P.O.:520; I.V.:713.5; Blood:791; Other:1000]  Out: 5115 [Urine:615; Other:4500]  No intake/output data recorded.    Past Medical & Surgical History:  Past  Medical History:   Diagnosis Date    CKD (chronic kidney disease), stage IV 2022    Diabetes mellitus due to underlying condition with unspecified complications 2022    Gastroparesis 2022    Heart failure with preserved ejection fraction 2022    EF 55% on 3/22    History of gastroesophageal reflux (GERD)     History of supraventricular tachycardia     Hyperkalemia 2022    Sickle cell trait 2022    Type 2 diabetes mellitus        Past Surgical History:   Procedure Laterality Date     SECTION      x 3    ESOPHAGOGASTRODUODENOSCOPY N/A 10/18/2019    Procedure: ESOPHAGOGASTRODUODENOSCOPY (EGD);  Surgeon: Gianluca Mendez MD;  [facility];  Service: Endoscopy;  Laterality: N/A;       Past Social History:  Social History     Socioeconomic History    Marital status:    Tobacco Use    Smoking status: Never Smoker    Smokeless tobacco: Never Used   Substance and Sexual Activity    Alcohol use: No    Drug use: No    Sexual activity: Not Currently     Partners: Male     Birth control/protection: I.U.D.       Medications:  Scheduled Meds:   heparin (porcine)  4,000 Units Intra-Catheter Once    mupirocin   Nasal BID     Continuous Infusions:   nicardipine 4 mg/hr (22 0700)    nitroGLYCERIN Stopped (22 0521)     PRN Meds:.sodium chloride, acetaminophen-codeine 300-30mg, dextrose 10%, dextrose 10%, dextrose 10%, glucagon (human recombinant), glucose, glucose, insulin aspart U-100, labetaloL, melatonin, sodium chloride 0.9%  No current facility-administered medications on file prior to encounter.     Current Outpatient Medications on File Prior to Encounter   Medication Sig Dispense Refill    FLUoxetine 20 MG capsule Take 1 capsule (20 mg total) by mouth once daily. 30 capsule 1    isosorbide mononitrate (IMDUR) 60 MG 24 hr tablet Take 1 tablet (60 mg total) by mouth once daily. 30 tablet 1    LANTUS U-100 INSULIN 100 unit/mL injection SMARTSI  "Unit(s) SUB-Q Every Morning      levETIRAcetam (KEPPRA) 500 MG Tab Take 1 tablet (500 mg total) by mouth 2 (two) times daily. 60 tablet 1    torsemide (DEMADEX) 20 MG Tab Take 1 tablet (20 mg total) by mouth 2 (two) times a day. (Patient taking differently: Take 20 mg by mouth once daily.) 60 tablet 1    [DISCONTINUED] furosemide (LASIX) 40 MG tablet Take 1 tablet (40 mg total) by mouth 2 (two) times daily. 60 tablet 0    [DISCONTINUED] pantoprazole (PROTONIX) 40 MG tablet Take 1 tablet (40 mg total) by mouth once daily. 30 tablet 3       Allergies:  Penicillins    Past Family History:  Reviewed; refer to Hospitalist Admission Note    Review of Systems:  Review of Systems - All 14 systems reviewed and negative, except as noted in HPI    Physical Exam:  /78   Pulse 101   Temp 98.1 °F (36.7 °C) (Temporal)   Resp (!) 22   Ht 5' 2" (1.575 m)   Wt 79.2 kg (174 lb 9.7 oz)   LMP 12/01/2021 (Approximate)   SpO2 99%   Breastfeeding No   BMI 31.94 kg/m²     General Appearance:    NAD, AAO x 3, cooperative, appears stated age   Head:    Normocephalic, atraumatic   Eyes:    PER, EOMI, and conjunctiva/sclera clear bilaterally        Mouth:   Moist mucus membranes   Back:     No CVA tenderness   Lungs:     Rales   Heart:    Regular rate and rhythm, S1 and S2 normal, no murmur, rub   or gallop   Abdomen:     Soft, non-tender, non-distended, bowel sounds active all four   quadrants, no RT or guarding, no masses, no organomegaly   Extremities:   Warm and well perfused, anasarca   MSK:   No joint or muscle swelling, tenderness or deformity   Skin:   Skin color, texture, turgor normal, no rashes or lesions   Neurologic/Psychiatric:   CNII-XII intact, normal strength and sensation       throughout, no asterixis; normal affect, memory, judgement     and insight     Results:  Lab Results   Component Value Date     07/09/2022    K 4.1 07/09/2022     07/09/2022    CO2 22 (L) 07/09/2022    BUN 21 (H) " 07/09/2022    CREATININE 2.5 (H) 07/09/2022    CALCIUM 7.8 (L) 07/09/2022    ANIONGAP 8 07/09/2022    ESTGFRAFRICA 28 (A) 07/09/2022    EGFRNONAA 25 (A) 07/09/2022       Lab Results   Component Value Date    CALCIUM 7.8 (L) 07/09/2022    PHOS 4.4 07/07/2022       Recent Labs   Lab 07/09/22  0231   WBC 4.34   RBC 2.70*   HGB 7.8*   HCT 22.3*   PLT 98*   MCV 83   MCH 28.9   MCHC 35.0       I have personally reviewed pertinent radiological imaging and reports.    I have spent > 70 minutes providing care for this patient for the above diagnoses. These services have included chart/data/imaging review, evaluation, exam, formulation of plan, , note preparation, and discussions with staff involved in this patient's care.    Geeta Kaur NP    [facility] [address]  [place] [address]  [phone] (p)  [phone] (f)

## 2022-07-10 LAB
ANION GAP SERPL CALC-SCNC: 9 MMOL/L (ref 8–16)
BASOPHILS # BLD AUTO: 0.01 K/UL (ref 0–0.2)
BASOPHILS NFR BLD: 0.1 % (ref 0–1.9)
BUN SERPL-MCNC: 18 MG/DL (ref 6–20)
CALCIUM SERPL-MCNC: 7.6 MG/DL (ref 8.7–10.5)
CHLORIDE SERPL-SCNC: 106 MMOL/L (ref 95–110)
CO2 SERPL-SCNC: 23 MMOL/L (ref 23–29)
CREAT SERPL-MCNC: 2.5 MG/DL (ref 0.5–1.4)
DIFFERENTIAL METHOD: ABNORMAL
EOSINOPHIL # BLD AUTO: 0 K/UL (ref 0–0.5)
EOSINOPHIL NFR BLD: 0.3 % (ref 0–8)
ERYTHROCYTE [DISTWIDTH] IN BLOOD BY AUTOMATED COUNT: 15 % (ref 11.5–14.5)
EST. GFR  (AFRICAN AMERICAN): 28 ML/MIN/1.73 M^2
EST. GFR  (NON AFRICAN AMERICAN): 25 ML/MIN/1.73 M^2
GLUCOSE SERPL-MCNC: 129 MG/DL (ref 70–110)
HCT VFR BLD AUTO: 21.1 % (ref 37–48.5)
HGB BLD-MCNC: 7.4 G/DL (ref 12–16)
IMM GRANULOCYTES # BLD AUTO: 0.03 K/UL (ref 0–0.04)
IMM GRANULOCYTES NFR BLD AUTO: 0.4 % (ref 0–0.5)
LDH SERPL L TO P-CCNC: 353 U/L (ref 110–260)
LYMPHOCYTES # BLD AUTO: 0.5 K/UL (ref 1–4.8)
LYMPHOCYTES NFR BLD: 6.8 % (ref 18–48)
MCH RBC QN AUTO: 29.2 PG (ref 27–31)
MCHC RBC AUTO-ENTMCNC: 35.1 G/DL (ref 32–36)
MCV RBC AUTO: 83 FL (ref 82–98)
MONOCYTES # BLD AUTO: 0.6 K/UL (ref 0.3–1)
MONOCYTES NFR BLD: 7.4 % (ref 4–15)
NEUTROPHILS # BLD AUTO: 6.8 K/UL (ref 1.8–7.7)
NEUTROPHILS NFR BLD: 85 % (ref 38–73)
NRBC BLD-RTO: 0 /100 WBC
PLATELET # BLD AUTO: 99 K/UL (ref 150–450)
PMV BLD AUTO: 10.4 FL (ref 9.2–12.9)
POCT GLUCOSE: 132 MG/DL (ref 70–110)
POCT GLUCOSE: 158 MG/DL (ref 70–110)
POTASSIUM SERPL-SCNC: 4 MMOL/L (ref 3.5–5.1)
RBC # BLD AUTO: 2.53 M/UL (ref 4–5.4)
RETICS/RBC NFR AUTO: 6.3 % (ref 0.5–2.5)
SODIUM SERPL-SCNC: 138 MMOL/L (ref 136–145)
WBC # BLD AUTO: 7.99 K/UL (ref 3.9–12.7)

## 2022-07-10 PROCEDURE — 85025 COMPLETE CBC W/AUTO DIFF WBC: CPT | Performed by: INTERNAL MEDICINE

## 2022-07-10 PROCEDURE — 20000000 HC ICU ROOM

## 2022-07-10 PROCEDURE — 36415 COLL VENOUS BLD VENIPUNCTURE: CPT | Performed by: INTERNAL MEDICINE

## 2022-07-10 PROCEDURE — 99900035 HC TECH TIME PER 15 MIN (STAT)

## 2022-07-10 PROCEDURE — 85045 AUTOMATED RETICULOCYTE COUNT: CPT | Performed by: INTERNAL MEDICINE

## 2022-07-10 PROCEDURE — 94761 N-INVAS EAR/PLS OXIMETRY MLT: CPT

## 2022-07-10 PROCEDURE — 83615 LACTATE (LD) (LDH) ENZYME: CPT | Performed by: INTERNAL MEDICINE

## 2022-07-10 PROCEDURE — 27000221 HC OXYGEN, UP TO 24 HOURS

## 2022-07-10 PROCEDURE — 80048 BASIC METABOLIC PNL TOTAL CA: CPT | Performed by: INTERNAL MEDICINE

## 2022-07-10 PROCEDURE — 25000003 PHARM REV CODE 250: Performed by: INTERNAL MEDICINE

## 2022-07-10 PROCEDURE — 99233 PR SUBSEQUENT HOSPITAL CARE,LEVL III: ICD-10-PCS | Mod: ,,, | Performed by: INTERNAL MEDICINE

## 2022-07-10 PROCEDURE — 25000003 PHARM REV CODE 250: Performed by: NURSE PRACTITIONER

## 2022-07-10 PROCEDURE — 99233 SBSQ HOSP IP/OBS HIGH 50: CPT | Mod: ,,, | Performed by: INTERNAL MEDICINE

## 2022-07-10 RX ORDER — HEPARIN SODIUM 5000 [USP'U]/ML
5000 INJECTION, SOLUTION INTRAVENOUS; SUBCUTANEOUS
Status: CANCELLED | OUTPATIENT
Start: 2022-07-10

## 2022-07-10 RX ORDER — SODIUM CHLORIDE 9 MG/ML
INJECTION, SOLUTION INTRAVENOUS
Status: CANCELLED | OUTPATIENT
Start: 2022-07-10

## 2022-07-10 RX ORDER — SODIUM CHLORIDE 9 MG/ML
INJECTION, SOLUTION INTRAVENOUS ONCE
Status: CANCELLED | OUTPATIENT
Start: 2022-07-10 | End: 2022-07-10

## 2022-07-10 RX ADMIN — HYDROCODONE BITARTRATE AND ACETAMINOPHEN 1 TABLET: 5; 325 TABLET ORAL at 08:07

## 2022-07-10 RX ADMIN — FAMOTIDINE 20 MG: 20 TABLET ORAL at 09:07

## 2022-07-10 RX ADMIN — ISOSORBIDE MONONITRATE 60 MG: 60 TABLET, EXTENDED RELEASE ORAL at 09:07

## 2022-07-10 RX ADMIN — LEVETIRACETAM 500 MG: 500 TABLET, FILM COATED ORAL at 09:07

## 2022-07-10 RX ADMIN — MUPIROCIN: 20 OINTMENT TOPICAL at 09:07

## 2022-07-10 RX ADMIN — AMLODIPINE BESYLATE 5 MG: 5 TABLET ORAL at 09:07

## 2022-07-10 RX ADMIN — INSULIN ASPART 1 UNITS: 100 INJECTION, SOLUTION INTRAVENOUS; SUBCUTANEOUS at 08:07

## 2022-07-10 RX ADMIN — HYDROCODONE BITARTRATE AND ACETAMINOPHEN 1 TABLET: 5; 325 TABLET ORAL at 12:07

## 2022-07-10 RX ADMIN — MUPIROCIN: 20 OINTMENT TOPICAL at 08:07

## 2022-07-10 RX ADMIN — Medication 6 MG: at 08:07

## 2022-07-10 RX ADMIN — LEVETIRACETAM 500 MG: 500 TABLET, FILM COATED ORAL at 08:07

## 2022-07-10 RX ADMIN — HYDROCODONE BITARTRATE AND ACETAMINOPHEN 1 TABLET: 5; 325 TABLET ORAL at 02:07

## 2022-07-10 RX ADMIN — Medication 6 MG: at 02:07

## 2022-07-10 NOTE — PROGRESS NOTES
"INPATIENT NEPHROLOGY PROGRESS  [facility]    Patient Name: Tabby Howard  Date: 07/10/2022    Reason for consultation: KANWAL    Chief Complaint:   Chief Complaint   Patient presents with    Abnormal Lab       History of Present Illness:  Tabby Howard is a 32 y.o. female with a PMHx of DM II, supraventricular tachycardia, heart failure with preserved ejection fraction (22), Sickle cell trait, and CKD (stage IV) who states that she came to the ED because of abnormal labs. She has been having shortness of breathness, feeling lethargic and generalized weakness for few weeks but has been feeling worse.  Patient had lab work done this morning and was informed of abnormal "kidney" results with referral to the ED for further evaluation and work up.  She endorses diffuse back pain over the past month, intermittent chills as well as diarrhea a few days ago that has since improved.  Patient specifies she is able to urinate but notes that the volume has decreased.  Also states that her  leg swelling has been getting worse due to her being "full of fluid."  LMP was in 2021.  The patient denies fever, N/V, abdominal pain on exam, CP or any other symptoms at this time.  PSHx includes EGD and . She was seen by our group during her last hospitalization and was scheduled for f/u with Dr. BATRES next week- previsit labs showed severe anemia and metabolic derangements so she was sent into the hospital.      JUAN A neg, UA with 3+ protein, 1+ blood  - UPCR 10.8    Admit CT AP:  Interval development of right pleural effusion.  Stable prominent pericardial effusion.  Interstitial prominence raising question of pulmonary edema.  Stable abdomen and pelvis with ascites, anasarca.    Interval History:  - hypertensive emergency, Hb 4, K 6.4, CO2 15 in the ER- spoke with ER physician and advised trialysis cath placement for emergent HD with blood transfusion- did HD overnight, started NTG gtt for " BP control- switched to nicardipine gtt overnight- got 2u of blood- washington placed- oliguric, BP better, clearance parameters are better  7/9  Hgb improving, 7.8, post transfusion.  K+ at goal. Appreciate heme-onc input.  Pt fatigued, not conversing much.  No complaints.  Dialysis catheter to be replaced today, pt to have HD following.  7/10  Trialysis eval by gen surgery yesterday, line ok to continue to use--then pt pulled catheter out herself (per RN report), so new line was placed.  Had HD last night, 4L net fluid removal.  To have tunneled catheter placed tomorrow.  136cc UOP recorded.  Off cardene gtt, pressures stable thus far--improving w/fluid removal.      Plan of Care:    Assessment:  KANWAL/CKD V- likely now ESRD- history of uncontrolled DMII  Nephrotic syndrome with ansaraca; History of microscopic hematuria  HTN emergency/D CHF/Acut pulm edema/Pericardial effusion/Pleural effusion  Hyperkalemia/Acidosis  SHPT  Hemolytic anemia/Thrombocytopenia; likely component of Anemia of CKD as well    Plan:    - Initiated RRT on 7/7- plan for HD/UF 7/8, 7/9 as well. Anticipate discharge with dialysis with outpatient renal biopsy. Continue washington for now.  Tunneled cath scheduled for 7/11.  - Off cardene gtt- aim for BP ~150/90- once we get more fluid off, will start to add oral BP medication and diuretics. Renal diet with a 1.5L fluid restriction.   - Will f/u echo.  - Check phos, PTH--acceptable  - Hb is improved after 2u of blood- heme/onc following for assistance- r/o TTP.    Thank you for allowing us to participate in this patient's care. We will continue to follow.    Vital Signs:  Temp Readings from Last 3 Encounters:   07/10/22 98.6 °F (37 °C) (Temporal)   06/11/22 98.2 °F (36.8 °C) (Oral)   06/07/22 98.1 °F (36.7 °C) (Axillary)       Pulse Readings from Last 3 Encounters:   07/10/22 98   06/11/22 100   06/07/22 110       BP Readings from Last 3 Encounters:   07/10/22 (!) 165/93   06/11/22 (!) 173/105   06/07/22  129/81       Weight:  Wt Readings from Last 3 Encounters:   07/10/22 76.2 kg (167 lb 15.9 oz)   22 73.9 kg (163 lb)   22 74.2 kg (163 lb 9.3 oz)       INS/OUTS:  I/O last 3 completed shifts:  In: 1981 [P.O.:520; I.V.:461; Other:1000]  Out: 7726 [Urine:226; Other:7500]  No intake/output data recorded.    Past Medical & Surgical History:  Past Medical History:   Diagnosis Date    CKD (chronic kidney disease), stage IV 2022    Diabetes mellitus due to underlying condition with unspecified complications 2022    Gastroparesis 2022    Heart failure with preserved ejection fraction 2022    EF 55% on 3/22    History of gastroesophageal reflux (GERD)     History of supraventricular tachycardia     Hyperkalemia 2022    Sickle cell trait 2022    Type 2 diabetes mellitus        Past Surgical History:   Procedure Laterality Date     SECTION      x 3    ESOPHAGOGASTRODUODENOSCOPY N/A 10/18/2019    Procedure: ESOPHAGOGASTRODUODENOSCOPY (EGD);  Surgeon: Gianluca Mendez MD;  Location: [facility];  Service: Endoscopy;  Laterality: N/A;       Past Social History:  Social History     Socioeconomic History    Marital status:    Tobacco Use    Smoking status: Never Smoker    Smokeless tobacco: Never Used   Substance and Sexual Activity    Alcohol use: No    Drug use: No    Sexual activity: Not Currently     Partners: Male     Birth control/protection: I.U.D.       Medications:  Scheduled Meds:   amLODIPine  5 mg Oral Daily    famotidine  20 mg Oral Daily    heparin (porcine)  4,000 Units Intra-Catheter Once    isosorbide mononitrate  60 mg Oral Daily    levETIRAcetam  500 mg Oral BID    mupirocin   Nasal BID     Continuous Infusions:   nicardipine Stopped (07/10/22 0522)     PRN Meds:.sodium chloride, acetaminophen-codeine 300-30mg, dextrose 10%, dextrose 10%, dextrose 10%, glucagon (human recombinant), glucose, glucose, heparin (porcine),  "HYDROcodone-acetaminophen, insulin aspart U-100, labetaloL, melatonin, sodium chloride 0.9%  No current facility-administered medications on file prior to encounter.     Current Outpatient Medications on File Prior to Encounter   Medication Sig Dispense Refill    FLUoxetine 20 MG capsule Take 1 capsule (20 mg total) by mouth once daily. 30 capsule 1    isosorbide mononitrate (IMDUR) 60 MG 24 hr tablet Take 1 tablet (60 mg total) by mouth once daily. 30 tablet 1    LANTUS U-100 INSULIN 100 unit/mL injection SMARTSI Unit(s) SUB-Q Every Morning      levETIRAcetam (KEPPRA) 500 MG Tab Take 1 tablet (500 mg total) by mouth 2 (two) times daily. 60 tablet 1    torsemide (DEMADEX) 20 MG Tab Take 1 tablet (20 mg total) by mouth 2 (two) times a day. (Patient taking differently: Take 20 mg by mouth once daily.) 60 tablet 1    [DISCONTINUED] furosemide (LASIX) 40 MG tablet Take 1 tablet (40 mg total) by mouth 2 (two) times daily. 60 tablet 0    [DISCONTINUED] pantoprazole (PROTONIX) 40 MG tablet Take 1 tablet (40 mg total) by mouth once daily. 30 tablet 3       Allergies:  Penicillins    Past Family History:  Reviewed; refer to Hospitalist Admission Note    Review of Systems:  Review of Systems - All 14 systems reviewed and negative, except as noted in HPI    Physical Exam:  BP (!) 165/93   Pulse 98   Temp 98.6 °F (37 °C) (Temporal)   Resp 16   Ht 5' 2" (1.575 m)   Wt 76.2 kg (167 lb 15.9 oz)   LMP 2021 (Approximate)   SpO2 100%   Breastfeeding No   BMI 30.73 kg/m²     General Appearance:    NAD, AAO x 3, cooperative, appears stated age   Head:    Normocephalic, atraumatic   Eyes:    PER, EOMI, and conjunctiva/sclera clear bilaterally        Mouth:   Moist mucus membranes   Back:     No CVA tenderness   Lungs:     Rales   Heart:    Regular rate and rhythm, S1 and S2 normal, no murmur, rub   or gallop   Abdomen:     Soft, non-tender, non-distended, bowel sounds active all four   quadrants, no RT or " guarding, no masses, no organomegaly   Extremities:   Warm and well perfused, anasarca   MSK:   No joint or muscle swelling, tenderness or deformity   Skin:   Skin color, texture, turgor normal, no rashes or lesions   Neurologic/Psychiatric:   CNII-XII intact, normal strength and sensation       throughout, no asterixis; normal affect, memory, judgement     and insight     Results:  Lab Results   Component Value Date     07/10/2022    K 4.0 07/10/2022     07/10/2022    CO2 23 07/10/2022    BUN 18 07/10/2022    CREATININE 2.5 (H) 07/10/2022    CALCIUM 7.6 (L) 07/10/2022    ANIONGAP 9 07/10/2022    ESTGFRAFRICA 28 (A) 07/10/2022    EGFRNONAA 25 (A) 07/10/2022       Lab Results   Component Value Date    CALCIUM 7.6 (L) 07/10/2022    PHOS 4.4 07/07/2022       Recent Labs   Lab 07/10/22  0315   WBC 7.99   RBC 2.53*   HGB 7.4*   HCT 21.1*   PLT 99*   MCV 83   MCH 29.2   MCHC 35.1       I have personally reviewed pertinent radiological imaging and reports.    I have spent > 70 minutes providing care for this patient for the above diagnoses. These services have included chart/data/imaging review, evaluation, exam, formulation of plan, , note preparation, and discussions with staff involved in this patient's care.    Geeta Kaur NP    [facility] [address] [address]  [phone] (p)  [phone] (f)

## 2022-07-10 NOTE — PROCEDURES
"Tabby Howard is a 32 y.o. female patient.    Temp: 98.5 °F (36.9 °C) (07/09/22 1200)  Pulse: 99 (07/09/22 1500)  Resp: (!) 24 (07/09/22 1847)  BP: (!) 150/87 (07/09/22 1500)  SpO2: 100 % (07/09/22 1500)  Weight: 79.2 kg (174 lb 9.7 oz) (07/09/22 0200)  Height: 5' 2" (157.5 cm) (07/08/22 1000)       Central Line    Date/Time: 7/9/2022 7:15 PM  Performed by: Lillian Yee MD  Authorized by: Lillian Yee MD     Location procedure was performed:  [facility] ICU  Pre-operative diagnosis:  Renal failure- dilaysis  Consent Done ?:  Yes  Time out complete?: Verified correct patient, procedure, equipment, staff, and site/side    Indications:  Med administration and hemodialysis  Anesthesia:  Local infiltration  Local anesthetic:  Lidocaine 1% without epinephrine  Anesthetic total (ml):  15  Description of findings:  Trialysis came completely out (placed by another physician), patient without iv access, ej placed and then trialysis replaced (EJ used for pain meds prior to procedure dilaudid 1 mg)  Preparation:  Skin prepped with ChloraPrep  Skin prep agent dried: Skin prep agent completely dried prior to procedure    Sterile barriers: All five maximal sterile barriers used - gloves, gown, cap, mask and large sterile sheet    Hand hygiene: Hand hygiene performed immediately prior to central venous catheter insertion    Location:  Right internal jugular  Catheter type:  Trialysis  Catheter size:  14 Fr  Inserted Catheter Length (cm):  15  Ultrasound guidance: Yes    Vessel Caliber:  Large   patent  Needle advanced into vessel with real time ultrasound guidance.    Guidewire confirmed in vessel.    Steril sheath on probe.    Sterile gel used.  Manometry: No    Number of attempts:  2  Securement:  Line sutured, sterile dressing applied and blood return through all ports  Complications: No    Estimated blood loss (mL):  10  Specimens: No    Implants: No    XRay:  Placement verified by x-ray, no pneumothorax on x-ray and tip " termination  Adverse Events:  None  Other Complications:  EJ placed prior to procedure for temporary access for med administration for procedure, removed during procedure   EJ placed prior to procedure for temporary access for med administration for procedure, removed during procedureTermination Site: superior vena cava        7/9/2022

## 2022-07-10 NOTE — CARE UPDATE
07/09/22 1954   Patient Assessment/Suction   Level of Consciousness (AVPU) responds to voice   Respiratory Effort Normal;Unlabored   PRE-TX-O2   O2 Device (Oxygen Therapy) nasal cannula   $ Is the patient on Low Flow Oxygen? Yes   Flow (L/min) 2   SpO2 98 %   Pulse Oximetry Type Continuous   $ Pulse Oximetry - Multiple Charge Pulse Oximetry - Multiple   Pulse 109   Resp 13   Aerosol Therapy   $ Aerosol Therapy Charges PRN treatment not required   Respiratory Treatment Status (SVN) PRN treatment not required

## 2022-07-10 NOTE — PLAN OF CARE
CICU DAILY GOALS       A: Awake    RASS: Goal -  alert and calm  Actual - alert and calm    Restraint necessity: none   B: Breath   SBT:N/A  C: Coordinate A & B, analgesics/sedatives   Pain: Managed   SAT: N/A  D: Delirium   CAM-ICU: Overall CAM-ICU: Negative  E: Early(intubated/ Progressive (non-intubated) Mobility   MOVE Screen: N/A   Activity: Activity Management: Rolling - L1  FAS: Feeding/Nutrition   Diet order: Diet/Nutrition Received: consistent carb/diabetic diet, Specialty Diet/Nutrition Received: renal diet Fluid restriction: Fluid Requirement: 1.5L fluid restriction  T: Thrombus   DVT prophylaxis: VTE Required Core Measure: (TEDs) Compression stocking therapy initiated/maintained  H: HOB Elevation   Head of Bed (HOB) Positioning: HOB at 30-45 degrees  U: Ulcer Prophylaxis   GI: Yes  G: Glucose control   Managed Glycemic Management: blood glucose monitored, supplemental insulin given  S: Skin   Bundle compliance: yes  Bathing/Skin Care: bath, partial, dressed/undressed, electrode patches/site rotation, linen changed Date: 7/10/22  B: Bowel Function   none   Pelayo necessity:      Urethral Catheter 07/07/22 2330 Non-latex 16 Fr.-Reason for Continuing Urinary Catheterization: Urinary retention   CVC necessity:yes dialysis   IPAD offered: has iphone  D: De-escalation Antibx   yes  Plan for the day   Increase mobility. Monitor vital signs. Wean Nicardipine as tolerated. Maintain comfort and safety  Family/Goals of care/Code Status   Code Status: Prior     No acute events throughout day, VS and assessment per flow sheet, patient progressing towards goals as tolerated, plan of care reviewed with Tabby Howard and family, all concerns addressed, will continue to monitor.

## 2022-07-10 NOTE — PLAN OF CARE
POC reviewed with the patient and they verbalized understanding. All comments and concerns addressed.     Patient tearful throughout shift. Emotional support and reassurance provided. Trialysis replaced after accidental removal (see significant events notes). Pelayo maintained--minimal urine output. Diabetic/renal diet with 1.5L fluid restriction. Cardene infusion in place and patient started on amlodipine PO today. PRN pain medication utilized per MAR.     Bed locked in lowest position with bed alarm set, call light within reach. Safety precautions maintained.

## 2022-07-10 NOTE — SIGNIFICANT EVENT
Nakia SPRINGER at bedside. MD removed gauze dressing from patient's trialysis site and catheter noted to be completely out of the patient's neck. MD removed sutures x2 from the patients neck and applied a gauze dressing to the site.     MD used US guidance to place a PIV in the patient's EJ due to no other IV access. Dilaudid 1mg given per Nakia SPRINGER for pain control prior to trialysis placement. EICU notified of procedure, time out performed, and MD placed line. PIV in EJ removed during trialysis insertion due to placement of both lines.    Xray at bedside to check placement. Nakia SPRINGER confirmed placement at bedside and gave ok for dialysis RN to begin dialysis. MD ordered additional 0.5mg dilaudid due to patient c/o pain after insertion. Patient's cardene infusion restarted via trialysis due to hypertension above ordered parameters.    Patient educated on importance of mindfulness while moving around to further protect new trialysis line and help prevent any future dislodgement. Patient verbalized understanding.

## 2022-07-10 NOTE — PROGRESS NOTES
07/09/22 2230   Handoff Report   Received From aga Diez To anupam   Vital Signs   Temp 98 °F (36.7 °C)   Pulse (!) 111   Resp 17   SpO2 98 %   BP (!) 146/98   MAP (mmHg) 118   Post-Hemodialysis Assessment   Rinseback Volume (mL) 250 mL   Blood Volume Processed (Liters) 50 L   Dialyzer Clearance Lightly streaked   Duration of Treatment 180 minutes   Additional Fluid Intake (mL) 500 mL   Total UF (mL) 4500 mL   Net Fluid Removal 4000   Patient Response to Treatment vss   Post-Treatment Weight 75.2 kg (165 lb 12.6 oz)   Treatment Weight Change -4   Post-Hemodialysis Comments end of tx

## 2022-07-10 NOTE — PLAN OF CARE
No acute changes throughout shift. A&Ox4, NSR on tele. Remains on/off of Cardene gtt-see MAR for titrations. Poor appetite-1.5 L fluid restriction maintained. Minimal-no OP through [place]ton. Pt to be NPO @ midnight pending possible tunnel cath placement. Accu checks done. No SS insulin needed. Safety checks done. Will cont to monitor and report off to oncoming nurse to assume care.     Problem: Infection  Goal: Absence of Infection Signs and Symptoms  Outcome: Ongoing, Progressing     Problem: Hemodynamic Instability (Hemodialysis)  Goal: Effective Tissue Perfusion  Outcome: Ongoing, Progressing     Problem: Infection (Hemodialysis)  Goal: Absence of Infection Signs and Symptoms  Outcome: Ongoing, Progressing

## 2022-07-10 NOTE — ASSESSMENT & PLAN NOTE
New HD  Follow renal panel and electrolytes closely.  Follow renal recommendations.  CT r/o renal stone  Adjust renal dose medications for creatinine clearance 10-50cc/min.  Avoid NSAIDs, De Leon-II inhibitors, ACE-I, Angiotensin Receptor Blockers, or Aminoglycosides.  Urine analysis.  Will defer following workup to nephro   JUAN A, C3, C4, ESR, UPEP and SPEP  Tunneled cath 7/11

## 2022-07-10 NOTE — PROGRESS NOTES
"[facility]  Hematology/Oncology  Progress Note    Patient Name: Tabby Howard  Admission Date: 7/7/2022  Hospital Length of Stay: 2 days  Code Status: Prior     Subjective:     Interval History: pt is getting IV access as I am in the room , [facility] staff in as well. Pt is crying   On cardene infusion  HPI  32 y.o. female with a PMHx of DM II, supraventricular tachycardia, heart failure with preserved ejection fraction (04/12/22), Sickle cell trait, and CKD (stage IV) sent to the ED because of abnormal labs.   c/o shortness of breathness, feeling lethargic and generalized weakness for few weeks but has been feeling worse.   c/o diffuse back pain over the past month, intermittent chills as well as diarrhea a few days ago that has since improved.  Patient specifies she is able to urinate but notes that the volume has decreased.  Also states that her  leg swelling has been getting worse due to her being "full of fluid."  LMP was in December 2021.  per med rec.  Patient reports she had irregular menstrual cycles up until December.  She has been receiving intermittent blood transfusions  hospital since December     Oncology Consult:  We have been consulted for severe anemia.  On arrival she was noted to have a hemoglobin of 4.9.  She is now status post 2 units packed red blood cell.  Patient reports she seen a hematologist years ago and was told she had sickle cell trait.    Medications:  Continuous Infusions:   nicardipine 2 mg/hr (07/09/22 1918)     Scheduled Meds:   amLODIPine  5 mg Oral Daily    famotidine  20 mg Oral Daily    heparin (porcine)  4,000 Units Intra-Catheter Once    isosorbide mononitrate  60 mg Oral Daily    levETIRAcetam  500 mg Oral BID    mupirocin   Nasal BID     PRN Meds:sodium chloride, acetaminophen-codeine 300-30mg, dextrose 10%, dextrose 10%, dextrose 10%, glucagon (human recombinant), glucose, glucose, heparin (porcine), HYDROcodone-acetaminophen, insulin aspart " U-100, labetaloL, melatonin, sodium chloride 0.9%     Objective:     Vital Signs (Most Recent):  Temp: 98.3 °F (36.8 °C) (07/09/22 1930)  Pulse: 107 (07/09/22 2115)  Resp: 13 (07/09/22 2115)  BP: 111/72 (07/09/22 2115)  SpO2: 100 % (07/09/22 2115) Vital Signs (24h Range):  Temp:  [98.1 °F (36.7 °C)-98.5 °F (36.9 °C)] 98.3 °F (36.8 °C)  Pulse:  [] 107  Resp:  [8-52] 13  SpO2:  [88 %-100 %] 100 %  BP: (111-199)/() 111/72     Weight: 79.2 kg (174 lb 9.7 oz)  Body mass index is 31.94 kg/m².  Body surface area is 1.86 meters squared.      Intake/Output Summary (Last 24 hours) at 7/9/2022 2121  Last data filed at 7/9/2022 1930  Gross per 24 hour   Intake 756.16 ml   Output 166 ml   Net 590.16 ml       Physical Exam  GENERAL: Comfortable looking patient.  Tearful  Awake, alert and oriented to time, person and place.  No anxiety, or agitation.       HEENT: Normal conjunctivae and eyelids. WNL.  PERRLA 3 to 4 mm. No icterus, no pallor, no congestion, and no discharge noted.      NECK:  Supple. Trachea is central.  No crepitus.  No JVD or masses.     RESPIRATORY:  No intercostal retractions.  No dullness to percussion.  Chest is clear to auscultation.  No rales, rhonchi or wheezes.  No crepitus.  Good air entry bilaterally.     CARDIOVASCULAR:  S1 and S2 are normally heard without murmurs or gallops.  All peripheral pulses are present.     ABDOMEN:  Normal abdomen.  No hepatosplenomegaly.  No free fluid.  Bowel sounds are present.  No hernia noted. No masses.  No rebound or tenderness.  No guarding or rigidity.  Umbilicus is midline.     LYMPHATICS:  No axillary, cervical, supraclavicular, submental, or inguinal lymphadenopathy.     SKIN/MUSCULOSKELETAL:  There is no evidence of excoriation marks or ecchmosis.  No rashes.  No cyanosis.  No clubbing.  No joint or skeletal deformities noted.  Normal range of motion. + edema noted in bilateral lower extremities     NEUROLOGIC:  Higher functions are appropriate.  No  cranial nerve deficits.  Normal jatin.  Normal strength.  Motor and sensory functions are normal.  Deep tendon reflexes are normal.     GENITAL/RECTAL:  Exams are deferred  Significant Labs:   Lab Results   Component Value Date    WBC 7.96 07/09/2022    HGB 8.5 (L) 07/09/2022    HCT 23.5 (L) 07/09/2022    MCV 82 07/09/2022     (L) 07/09/2022     CMP  Sodium   Date Value Ref Range Status   07/09/2022 137 136 - 145 mmol/L Final     Potassium   Date Value Ref Range Status   07/09/2022 4.1 3.5 - 5.1 mmol/L Final     Chloride   Date Value Ref Range Status   07/09/2022 107 95 - 110 mmol/L Final     CO2   Date Value Ref Range Status   07/09/2022 22 (L) 23 - 29 mmol/L Final     Glucose   Date Value Ref Range Status   07/09/2022 187 (H) 70 - 110 mg/dL Final     BUN   Date Value Ref Range Status   07/09/2022 21 (H) 6 - 20 mg/dL Final     Creatinine   Date Value Ref Range Status   07/09/2022 2.5 (H) 0.5 - 1.4 mg/dL Final     Calcium   Date Value Ref Range Status   07/09/2022 7.8 (L) 8.7 - 10.5 mg/dL Final     Total Protein   Date Value Ref Range Status   07/08/2022 5.1 (L) 6.0 - 8.4 g/dL Final     Albumin   Date Value Ref Range Status   07/08/2022 2.0 (L) 3.5 - 5.2 g/dL Final     Total Bilirubin   Date Value Ref Range Status   07/08/2022 1.2 (H) 0.1 - 1.0 mg/dL Final     Comment:     For infants and newborns, interpretation of results should be based  on gestational age, weight and in agreement with clinical  observations.    Premature Infant recommended reference ranges:  Up to 24 hours.............<8.0 mg/dL  Up to 48 hours............<12.0 mg/dL  3-5 days..................<15.0 mg/dL  6-29 days.................<15.0 mg/dL       Alkaline Phosphatase   Date Value Ref Range Status   07/08/2022 86 55 - 135 U/L Final     AST   Date Value Ref Range Status   07/08/2022 17 10 - 40 U/L Final     ALT   Date Value Ref Range Status   07/08/2022 15 10 - 44 U/L Final     Anion Gap   Date Value Ref Range Status   07/09/2022 8 8 - 16  mmol/L Final     eGFR if    Date Value Ref Range Status   07/09/2022 28 (A) >60 mL/min/1.73 m^2 Final     eGFR if non    Date Value Ref Range Status   07/09/2022 25 (A) >60 mL/min/1.73 m^2 Final     Comment:     Calculation used to obtain the estimated glomerular filtration  rate (eGFR) is the CKD-EPI equation.            Assessment/Plan:     Active Diagnoses:    Diagnosis Date Noted POA    PRINCIPAL PROBLEM:  Acute renal failure superimposed on chronic kidney disease [N17.9, N18.9] 07/07/2022 Yes    Hypertensive emergency [I16.1] 07/08/2022 Yes    Anemia [D64.9] 07/07/2022 Yes    Seizure [R56.9] 05/29/2022 Yes    CKD (chronic kidney disease), stage IV [N18.4] 04/12/2022 Yes    Sickle cell trait [D57.3] 04/12/2022 Yes    Pericardial effusion [I31.3] 03/07/2022 Yes    Anemia of chronic kidney failure [N18.9, D63.1] 04/24/2021 Yes    Type II diabetes mellitus [E11.9] 10/16/2019 Yes      Problems Resolved During this Admission:    Diagnosis Date Noted Date Resolved POA    Hyperkalemia [E87.5] 07/07/2022 07/08/2022 Yes     Anemia:  Concerning for hemolytic anemia post transfusion from yesterday hemoglobin is increased to 8.5 MCV is normal.  Ferritin is over 3000. Haptoglobin less than 10 patient's total protein is also decreased .  G6PD vitamin B12 hemoglobin electrophoresis I pending  Jemal test is negative.  Total bilirubin is slightly elevated it was 2.1.  LDH is elevated at 446 and retic count is elevated be suggest hemolysis however patient has known sickle cell trait/anemia  Will continue to follow transfuse as needed wait for rest of workup to return  -we will follow-up all results  -transfuse 1 unit packed red blood cell for hemoglobin less than 7     Acute renal failure on chronic kidney disease:  Creatinine is improving  Managed by Nephrology       Uncontrolled hypertension:  Managed by hospitalist currently on Cardene drip     CHF:  Managed by Cardiology  Janessa RAMACHANDRAN  MD Baldo  Hematology/Oncology  [facility]

## 2022-07-10 NOTE — PROGRESS NOTES
"[facility] Medicine  Progress Note    Patient Name: Tabby Howard  MRN: [id number]  Patient Class: IP- Inpatient   Admission Date: 2022  Length of Stay: 3 days  Attending Physician: Linda Cueto MD  Primary Care Provider: Primary Doctor No        Subjective:     Principal Problem:Acute renal failure superimposed on chronic kidney disease        HPI:  Tabby Howard is a 32 y.o. female with a PMHx of DM II, supraventricular tachycardia, heart failure with preserved ejection fraction (22), Sickle cell trait, and CKD (stage IV) who states that she came to the ED because of abnormal labs. She has been having shortness of breathness, feeling lethargic and generalized weakness for few weeks but has been feeling worse.  Patient had lab work done this morning and was informed of abnormal "kidney" results with referral to the ED for further evaluation and work up.  She endorses diffuse back pain over the past month, intermittent chills as well as diarrhea a few days ago that has since improved.  Patient specifies she is able to urinate but notes that the volume has decreased.  Also states that her  leg swelling has been getting worse due to her being "full of fluid."  LMP was in 2021.  The patient denies fever, N/V, abdominal pain on exam, CP or any other symptoms at this time.  PSHx includes EGD and .  Does not see a nephrologist for her CKD     The history is provided by the patient.          Overview/Hospital Course:  Patient was admitted for acute on chronic kidney disease with hyperkalemia and acidosis.  Patient was also found to be in hypertensive emergency.  On arrival patient was anemic Hb 4. Patient was transfused 2 units of blood with her new dialysis.  On arrival K 6.4, CO2 15. Nephrology was consulted patient was started on hemodialysis after dialysis access was placed.  For blood pressure control started NTG gtt and when blood pressure was not " controlled with nitro drip patient was switched to nicardipine gtt patient continued to be oliguric, hemoglobin improved after cut transition, hematology was consulted for further evaluation of anemia.  Workup showed hemolytic anemia which is attributed hemolysis during hypertensive emergency.  Patient's potassium improved acidosis improved after hemodialysis.       Interval History: On cardene drip. HD cath replaced yesterday, HD yesterday, tunneled cath to be placed tomorrow    Review of Systems   Constitutional:  Positive for fatigue. Negative for chills.   HENT:  Negative for sore throat.    Respiratory:  Negative for cough and shortness of breath.    Gastrointestinal:  Negative for abdominal pain, nausea and vomiting.   Genitourinary:  Negative for decreased urine volume.   Musculoskeletal:  Positive for back pain.   Skin:  Positive for pallor.   Neurological:  Positive for weakness. Negative for seizures and headaches.   Psychiatric/Behavioral:  Negative for behavioral problems.    Objective:     Vital Signs (Most Recent):  Temp: 98.6 °F (37 °C) (07/10/22 0400)  Pulse: 108 (07/10/22 1215)  Resp: 14 (07/10/22 1215)  BP: (!) 144/86 (07/10/22 1215)  SpO2: (!) 94 % (07/10/22 1215)   Vital Signs (24h Range):  Temp:  [98 °F (36.7 °C)-98.7 °F (37.1 °C)] 98.6 °F (37 °C)  Pulse:  [] 108  Resp:  [9-52] 14  SpO2:  [88 %-100 %] 94 %  BP: (100-199)/() 144/86     Weight: 76.2 kg (167 lb 15.9 oz)  Body mass index is 30.73 kg/m².    Intake/Output Summary (Last 24 hours) at 7/10/2022 1256  Last data filed at 7/10/2022 0930  Gross per 24 hour   Intake 829.32 ml   Output 4636 ml   Net -3806.68 ml        Physical Exam  Vitals and nursing note reviewed.   Constitutional:       General: She is not in acute distress.     Appearance: She is well-developed. She is ill-appearing. She is not diaphoretic.      Comments: Anasarca.   HENT:      Head: Normocephalic and atraumatic.      Right Ear: External ear normal.      Left  Ear: External ear normal.      Nose: Nose normal.      Mouth/Throat:      Mouth: Mucous membranes are moist.   Eyes:      General: No scleral icterus.     Extraocular Movements: Extraocular movements intact.      Conjunctiva/sclera: Conjunctivae normal.      Pupils: Pupils are equal, round, and reactive to light.   Neck:      Vascular: No JVD.   Cardiovascular:      Rate and Rhythm: Normal rate and regular rhythm.      Pulses: Normal pulses.      Heart sounds: Normal heart sounds. No murmur heard.    No friction rub. No gallop.   Pulmonary:      Effort: Pulmonary effort is normal. No respiratory distress.      Breath sounds: Normal breath sounds. No wheezing or rales.   Abdominal:      General: Bowel sounds are normal. There is no distension.      Palpations: Abdomen is soft.      Tenderness: There is no abdominal tenderness. There is no guarding or rebound.   Genitourinary:     Comments: Pelayo.  Musculoskeletal:         General: No tenderness. Normal range of motion.      Cervical back: Neck supple.      Right lower leg: Edema present.      Left lower leg: Edema present.   Skin:     General: Skin is warm and dry.      Coloration: Skin is not pale.      Findings: No erythema or rash.   Neurological:      Mental Status: She is oriented to person, place, and time.      Cranial Nerves: No cranial nerve deficit.      Motor: No weakness.   Psychiatric:         Mood and Affect: Mood normal.         Behavior: Behavior normal.       Significant Labs: All pertinent labs within the past 24 hours have been reviewed.  CBC:   Recent Labs   Lab 07/09/22  0231 07/09/22  1655 07/10/22  0315   WBC 4.34 7.96 7.99   HGB 7.8* 8.5* 7.4*   HCT 22.3* 23.5* 21.1*   PLT 98* 105* 99*       CMP:   Recent Labs   Lab 07/08/22  1517 07/09/22  0231 07/10/22  0315   NA  --  137 138   K  --  4.1 4.0   CL  --  107 106   CO2  --  22* 23   GLU  --  187* 129*   BUN  --  21* 18   CREATININE  --  2.5* 2.5*   CALCIUM  --  7.8* 7.6*   PROT 5.1*  --   --     ALBUMIN 2.0*  --   --    BILITOT 1.2*  --   --    ALKPHOS 86  --   --    AST 17  --   --    ALT 15  --   --    ANIONGAP  --  8 9   EGFRNONAA  --  25* 25*         Significant Imaging: I have reviewed all pertinent imaging results/findings within the past 24 hours.      Assessment/Plan:      * Acute renal failure superimposed on chronic kidney disease  New HD  Follow renal panel and electrolytes closely.  Follow renal recommendations.  CT r/o renal stone  Adjust renal dose medications for creatinine clearance 10-50cc/min.  Avoid NSAIDs, De Loen-II inhibitors, ACE-I, Angiotensin Receptor Blockers, or Aminoglycosides.  Urine analysis.  Will defer following workup to nephro   JUAN A, C3, C4, ESR, UPEP and SPEP  Tunneled cath 7/11                  Hypertensive emergency  On  Cardene drip  Started on amlodipine and isosorbide mononitrate      Anemia  Patient's anemia is currently controlled. Has recieved 2 units of PRBCs on 7/7. Etiology likely d/t Anemia of acute illness + Hemolytic anemia  Retic count elevated   Will cont to trend HnH   Current CBC reviewed-   Lab Results   Component Value Date    HGB 7.4 (L) 07/10/2022    HCT 21.1 (L) 07/10/2022     Monitor serial CBC and transfuse if patient becomes hemodynamically unstable, symptomatic or H/H drops below 7/21.         Pericardial effusion  ECHO shows · Moderate circumferential pericardial effusion.     The left ventricle is normal in size with moderate concentric hypertrophy and low normal systolic function.  · The estimated ejection fraction is 50%.  · Normal left ventricular diastolic function.  · Normal right ventricular size with normal right ventricular systolic function.          Seizure  Hx noted      Sickle cell trait  Hx noted        CKD (chronic kidney disease), stage IV  Hx noted      Anemia of chronic kidney failure  Hx noted  Ml 2/2  hypertensive emergency and acute illness   S/p  2 units   Patient's anemia is currently controlled. Has recieved 2 units of  PRBCs on 7/7.   Current CBC reviewed-   Lab Results   Component Value Date    HGB 7.4 (L) 07/10/2022    HCT 21.1 (L) 07/10/2022     Monitor serial CBC and transfuse if patient becomes hemodynamically unstable, symptomatic or H/H drops below 7/21.         Type II diabetes mellitus     Patient's FSGs are controlled on current medication regimen.  Last A1c reviewed-   Lab Results   Component Value Date    HGBA1C 5.8 (H) 05/15/2022     Most recent fingerstick glucose reviewed-   Recent Labs   Lab 07/08/22 2032 07/09/22  0800   POCTGLUCOSE 176* 185*     Current correctional scale  Medium  Maintain anti-hyperglycemic dose as follows-   Antihyperglycemics (From admission, onward)            Start     Stop Route Frequency Ordered    07/07/22 1719  insulin aspart U-100 pen 1-10 Units         -- SubQ Before meals & nightly PRN 07/07/22 1619        Hold Oral hypoglycemics while patient is in the hospital.          VTE Risk Mitigation (From admission, onward)         Ordered     heparin (porcine) injection 4,000 Units  As needed (PRN)         07/09/22 2022     IP VTE HIGH RISK PATIENT  Once         07/09/22 1957     Place JOCELYNE hose  Until discontinued         07/09/22 1957     heparin (porcine) injection 4,000 Units  Once         07/08/22 1624                Discharge Planning   TATIANA: 7/11/2022     Code Status: Prior   Is the patient medically ready for discharge?:     Reason for patient still in hospital (select all that apply): Patient trending condition and Treatment  Discharge Plan A: Home with family            Critical care time spent on the evaluation and treatment of severe organ dysfunction, review of pertinent labs and imaging studies, discussions with consulting providers and discussions with patient/family: 35 minutes.      Linda Cueto MD  Department of Hospital Medicine   [facility]

## 2022-07-10 NOTE — SUBJECTIVE & OBJECTIVE
Interval History: On cardene drip. HD cath replaced yesterday, HD yesterday, tunneled cath to be placed tomorrow    Review of Systems   Constitutional:  Positive for fatigue. Negative for chills.   HENT:  Negative for sore throat.    Respiratory:  Negative for cough and shortness of breath.    Gastrointestinal:  Negative for abdominal pain, nausea and vomiting.   Genitourinary:  Negative for decreased urine volume.   Musculoskeletal:  Positive for back pain.   Skin:  Positive for pallor.   Neurological:  Positive for weakness. Negative for seizures and headaches.   Psychiatric/Behavioral:  Negative for behavioral problems.    Objective:     Vital Signs (Most Recent):  Temp: 98.6 °F (37 °C) (07/10/22 0400)  Pulse: 108 (07/10/22 1215)  Resp: 14 (07/10/22 1215)  BP: (!) 144/86 (07/10/22 1215)  SpO2: (!) 94 % (07/10/22 1215)   Vital Signs (24h Range):  Temp:  [98 °F (36.7 °C)-98.7 °F (37.1 °C)] 98.6 °F (37 °C)  Pulse:  [] 108  Resp:  [9-52] 14  SpO2:  [88 %-100 %] 94 %  BP: (100-199)/() 144/86     Weight: 76.2 kg (167 lb 15.9 oz)  Body mass index is 30.73 kg/m².    Intake/Output Summary (Last 24 hours) at 7/10/2022 1256  Last data filed at 7/10/2022 0930  Gross per 24 hour   Intake 829.32 ml   Output 4636 ml   Net -3806.68 ml        Physical Exam  Vitals and nursing note reviewed.   Constitutional:       General: She is not in acute distress.     Appearance: She is well-developed. She is ill-appearing. She is not diaphoretic.      Comments: Anasarca.   HENT:      Head: Normocephalic and atraumatic.      Right Ear: External ear normal.      Left Ear: External ear normal.      Nose: Nose normal.      Mouth/Throat:      Mouth: Mucous membranes are moist.   Eyes:      General: No scleral icterus.     Extraocular Movements: Extraocular movements intact.      Conjunctiva/sclera: Conjunctivae normal.      Pupils: Pupils are equal, round, and reactive to light.   Neck:      Vascular: No JVD.   Cardiovascular:       Rate and Rhythm: Normal rate and regular rhythm.      Pulses: Normal pulses.      Heart sounds: Normal heart sounds. No murmur heard.    No friction rub. No gallop.   Pulmonary:      Effort: Pulmonary effort is normal. No respiratory distress.      Breath sounds: Normal breath sounds. No wheezing or rales.   Abdominal:      General: Bowel sounds are normal. There is no distension.      Palpations: Abdomen is soft.      Tenderness: There is no abdominal tenderness. There is no guarding or rebound.   Genitourinary:     Comments: Pelayo.  Musculoskeletal:         General: No tenderness. Normal range of motion.      Cervical back: Neck supple.      Right lower leg: Edema present.      Left lower leg: Edema present.   Skin:     General: Skin is warm and dry.      Coloration: Skin is not pale.      Findings: No erythema or rash.   Neurological:      Mental Status: She is oriented to person, place, and time.      Cranial Nerves: No cranial nerve deficit.      Motor: No weakness.   Psychiatric:         Mood and Affect: Mood normal.         Behavior: Behavior normal.       Significant Labs: All pertinent labs within the past 24 hours have been reviewed.  CBC:   Recent Labs   Lab 07/09/22  0231 07/09/22  1655 07/10/22  0315   WBC 4.34 7.96 7.99   HGB 7.8* 8.5* 7.4*   HCT 22.3* 23.5* 21.1*   PLT 98* 105* 99*       CMP:   Recent Labs   Lab 07/08/22  1517 07/09/22  0231 07/10/22  0315   NA  --  137 138   K  --  4.1 4.0   CL  --  107 106   CO2  --  22* 23   GLU  --  187* 129*   BUN  --  21* 18   CREATININE  --  2.5* 2.5*   CALCIUM  --  7.8* 7.6*   PROT 5.1*  --   --    ALBUMIN 2.0*  --   --    BILITOT 1.2*  --   --    ALKPHOS 86  --   --    AST 17  --   --    ALT 15  --   --    ANIONGAP  --  8 9   EGFRNONAA  --  25* 25*         Significant Imaging: I have reviewed all pertinent imaging results/findings within the past 24 hours.

## 2022-07-10 NOTE — ASSESSMENT & PLAN NOTE
Hx noted  Ml 2/2  hypertensive emergency and acute illness   S/p  2 units   Patient's anemia is currently controlled. Has recieved 2 units of PRBCs on 7/7.   Current CBC reviewed-   Lab Results   Component Value Date    HGB 7.4 (L) 07/10/2022    HCT 21.1 (L) 07/10/2022     Monitor serial CBC and transfuse if patient becomes hemodynamically unstable, symptomatic or H/H drops below 7/21.

## 2022-07-10 NOTE — NURSING
Communicated with Dr Gan re pt needing tunneled hemosplit.  Possibly able to place Monday afternoon/evening 7/11/22.  If not then will be placed Tuesday.  Ok for for pt to eat breakfast on 7/11/22, NPO after.

## 2022-07-11 ENCOUNTER — ANESTHESIA EVENT (OUTPATIENT)
Dept: SURGERY | Facility: HOSPITAL | Age: 32
DRG: 683 | End: 2022-07-11
Payer: MEDICAID

## 2022-07-11 LAB
ABO + RH BLD: NORMAL
ALBUMIN SERPL BCP-MCNC: 1.4 G/DL (ref 3.5–5.2)
ALBUMIN SERPL ELPH-MCNC: 2.31 G/DL (ref 3.35–5.55)
ALP SERPL-CCNC: 83 U/L (ref 55–135)
ALPHA1 GLOB SERPL ELPH-MCNC: 0.43 G/DL (ref 0.17–0.41)
ALPHA2 GLOB SERPL ELPH-MCNC: 0.6 G/DL (ref 0.43–0.99)
ALT SERPL W/O P-5'-P-CCNC: 11 U/L (ref 10–44)
ANION GAP SERPL CALC-SCNC: 7 MMOL/L (ref 8–16)
AST SERPL-CCNC: 13 U/L (ref 10–40)
B-GLOBULIN SERPL ELPH-MCNC: 0.6 G/DL (ref 0.5–1.1)
BASOPHILS # BLD AUTO: 0.02 K/UL (ref 0–0.2)
BASOPHILS NFR BLD: 0.2 % (ref 0–1.9)
BILIRUB SERPL-MCNC: 0.8 MG/DL (ref 0.1–1)
BLD GP AB SCN CELLS X3 SERPL QL: NORMAL
BLD PROD TYP BPU: NORMAL
BLOOD UNIT EXPIRATION DATE: NORMAL
BLOOD UNIT TYPE CODE: 6200
BLOOD UNIT TYPE: NORMAL
BUN SERPL-MCNC: 27 MG/DL (ref 6–20)
CALCIUM SERPL-MCNC: 7.8 MG/DL (ref 8.7–10.5)
CHLORIDE SERPL-SCNC: 107 MMOL/L (ref 95–110)
CO2 SERPL-SCNC: 24 MMOL/L (ref 23–29)
CODING SYSTEM: NORMAL
CREAT SERPL-MCNC: 3.5 MG/DL (ref 0.5–1.4)
DIFFERENTIAL METHOD: ABNORMAL
DISPENSE STATUS: NORMAL
EOSINOPHIL # BLD AUTO: 0.1 K/UL (ref 0–0.5)
EOSINOPHIL NFR BLD: 0.9 % (ref 0–8)
ERYTHROCYTE [DISTWIDTH] IN BLOOD BY AUTOMATED COUNT: 15.3 % (ref 11.5–14.5)
EST. GFR  (AFRICAN AMERICAN): 19 ML/MIN/1.73 M^2
EST. GFR  (NON AFRICAN AMERICAN): 16 ML/MIN/1.73 M^2
G6PD RBC-CCNT: 12.4 U/G HB (ref 8–11.9)
GAMMA GLOB SERPL ELPH-MCNC: 0.85 G/DL (ref 0.67–1.58)
GLUCOSE SERPL-MCNC: 134 MG/DL (ref 70–110)
HCT VFR BLD AUTO: 19.1 % (ref 37–48.5)
HGB BLD-MCNC: 6.6 G/DL (ref 12–16)
IMM GRANULOCYTES # BLD AUTO: 0.03 K/UL (ref 0–0.04)
IMM GRANULOCYTES NFR BLD AUTO: 0.4 % (ref 0–0.5)
LYMPHOCYTES # BLD AUTO: 0.8 K/UL (ref 1–4.8)
LYMPHOCYTES NFR BLD: 9.9 % (ref 18–48)
MCH RBC QN AUTO: 29.1 PG (ref 27–31)
MCHC RBC AUTO-ENTMCNC: 34.6 G/DL (ref 32–36)
MCV RBC AUTO: 84 FL (ref 82–98)
MONOCYTES # BLD AUTO: 0.7 K/UL (ref 0.3–1)
MONOCYTES NFR BLD: 7.8 % (ref 4–15)
NEUTROPHILS # BLD AUTO: 6.9 K/UL (ref 1.8–7.7)
NEUTROPHILS NFR BLD: 80.8 % (ref 38–73)
NRBC BLD-RTO: 0 /100 WBC
NUM UNITS TRANS PACKED RBC: NORMAL
PLATELET # BLD AUTO: 83 K/UL (ref 150–450)
PMV BLD AUTO: 9.6 FL (ref 9.2–12.9)
POCT GLUCOSE: 126 MG/DL (ref 70–110)
POCT GLUCOSE: 141 MG/DL (ref 70–110)
POCT GLUCOSE: 148 MG/DL (ref 70–110)
POCT GLUCOSE: 153 MG/DL (ref 70–110)
POTASSIUM SERPL-SCNC: 4.6 MMOL/L (ref 3.5–5.1)
PROT SERPL-MCNC: 4.4 G/DL (ref 6–8.4)
PROT SERPL-MCNC: 4.8 G/DL (ref 6–8.4)
RBC # BLD AUTO: 2.27 M/UL (ref 4–5.4)
SODIUM SERPL-SCNC: 138 MMOL/L (ref 136–145)
VIT B12 SERPL-MCNC: 614 NG/L (ref 180–914)
WBC # BLD AUTO: 8.48 K/UL (ref 3.9–12.7)

## 2022-07-11 PROCEDURE — 25000003 PHARM REV CODE 250: Performed by: INTERNAL MEDICINE

## 2022-07-11 PROCEDURE — 25000003 PHARM REV CODE 250: Performed by: HOSPITALIST

## 2022-07-11 PROCEDURE — 86920 COMPATIBILITY TEST SPIN: CPT | Performed by: HOSPITALIST

## 2022-07-11 PROCEDURE — 63600175 PHARM REV CODE 636 W HCPCS: Performed by: HOSPITALIST

## 2022-07-11 PROCEDURE — 85025 COMPLETE CBC W/AUTO DIFF WBC: CPT | Performed by: HOSPITALIST

## 2022-07-11 PROCEDURE — 12000002 HC ACUTE/MED SURGE SEMI-PRIVATE ROOM

## 2022-07-11 PROCEDURE — 36430 TRANSFUSION BLD/BLD COMPNT: CPT

## 2022-07-11 PROCEDURE — 99233 PR SUBSEQUENT HOSPITAL CARE,LEVL III: ICD-10-PCS | Mod: ,,, | Performed by: INTERNAL MEDICINE

## 2022-07-11 PROCEDURE — 36415 COLL VENOUS BLD VENIPUNCTURE: CPT | Performed by: HOSPITALIST

## 2022-07-11 PROCEDURE — 94761 N-INVAS EAR/PLS OXIMETRY MLT: CPT

## 2022-07-11 PROCEDURE — 63600175 PHARM REV CODE 636 W HCPCS: Performed by: INTERNAL MEDICINE

## 2022-07-11 PROCEDURE — 80100014 HC HEMODIALYSIS 1:1

## 2022-07-11 PROCEDURE — P9016 RBC LEUKOCYTES REDUCED: HCPCS | Performed by: HOSPITALIST

## 2022-07-11 PROCEDURE — 99223 1ST HOSP IP/OBS HIGH 75: CPT | Mod: ,,, | Performed by: STUDENT IN AN ORGANIZED HEALTH CARE EDUCATION/TRAINING PROGRAM

## 2022-07-11 PROCEDURE — 27000221 HC OXYGEN, UP TO 24 HOURS

## 2022-07-11 PROCEDURE — 99233 SBSQ HOSP IP/OBS HIGH 50: CPT | Mod: ,,, | Performed by: INTERNAL MEDICINE

## 2022-07-11 PROCEDURE — 99223 PR INITIAL HOSPITAL CARE,LEVL III: ICD-10-PCS | Mod: ,,, | Performed by: STUDENT IN AN ORGANIZED HEALTH CARE EDUCATION/TRAINING PROGRAM

## 2022-07-11 PROCEDURE — 86850 RBC ANTIBODY SCREEN: CPT | Performed by: HOSPITALIST

## 2022-07-11 PROCEDURE — 80053 COMPREHEN METABOLIC PANEL: CPT | Performed by: HOSPITALIST

## 2022-07-11 RX ORDER — HYDROCODONE BITARTRATE AND ACETAMINOPHEN 500; 5 MG/1; MG/1
TABLET ORAL
Status: DISCONTINUED | OUTPATIENT
Start: 2022-07-11 | End: 2022-07-15

## 2022-07-11 RX ORDER — AMLODIPINE BESYLATE 5 MG/1
10 TABLET ORAL DAILY
Status: DISCONTINUED | OUTPATIENT
Start: 2022-07-11 | End: 2022-07-17 | Stop reason: HOSPADM

## 2022-07-11 RX ORDER — SODIUM CHLORIDE, SODIUM LACTATE, POTASSIUM CHLORIDE, CALCIUM CHLORIDE 600; 310; 30; 20 MG/100ML; MG/100ML; MG/100ML; MG/100ML
INJECTION, SOLUTION INTRAVENOUS CONTINUOUS
Status: CANCELLED | OUTPATIENT
Start: 2022-07-11

## 2022-07-11 RX ORDER — LIDOCAINE HYDROCHLORIDE 10 MG/ML
0.5 INJECTION, SOLUTION EPIDURAL; INFILTRATION; INTRACAUDAL; PERINEURAL ONCE
Status: CANCELLED | OUTPATIENT
Start: 2022-07-11 | End: 2022-07-11

## 2022-07-11 RX ORDER — METHYLPREDNISOLONE SOD SUCC 125 MG
125 VIAL (EA) INJECTION 3 TIMES DAILY
Status: DISCONTINUED | OUTPATIENT
Start: 2022-07-11 | End: 2022-07-15

## 2022-07-11 RX ADMIN — NICARDIPINE HYDROCHLORIDE 2 MG/HR: 0.2 INJECTION, SOLUTION INTRAVENOUS at 01:07

## 2022-07-11 RX ADMIN — FAMOTIDINE 20 MG: 20 TABLET ORAL at 09:07

## 2022-07-11 RX ADMIN — ISOSORBIDE MONONITRATE 60 MG: 60 TABLET, EXTENDED RELEASE ORAL at 09:07

## 2022-07-11 RX ADMIN — MUPIROCIN: 20 OINTMENT TOPICAL at 10:07

## 2022-07-11 RX ADMIN — LEVETIRACETAM 500 MG: 500 TABLET, FILM COATED ORAL at 09:07

## 2022-07-11 RX ADMIN — HEPARIN SODIUM 4000 UNITS: 1000 INJECTION INTRAVENOUS; SUBCUTANEOUS at 08:07

## 2022-07-11 RX ADMIN — HYDROCODONE BITARTRATE AND ACETAMINOPHEN 1 TABLET: 5; 325 TABLET ORAL at 05:07

## 2022-07-11 RX ADMIN — AMLODIPINE BESYLATE 10 MG: 5 TABLET ORAL at 09:07

## 2022-07-11 RX ADMIN — INSULIN ASPART 2 UNITS: 100 INJECTION, SOLUTION INTRAVENOUS; SUBCUTANEOUS at 05:07

## 2022-07-11 RX ADMIN — METHYLPREDNISOLONE SODIUM SUCCINATE 125 MG: 125 INJECTION, POWDER, FOR SOLUTION INTRAMUSCULAR; INTRAVENOUS at 10:07

## 2022-07-11 RX ADMIN — EPOETIN ALFA-EPBX 10000 UNITS: 10000 INJECTION, SOLUTION INTRAVENOUS; SUBCUTANEOUS at 08:07

## 2022-07-11 RX ADMIN — HYDROCODONE BITARTRATE AND ACETAMINOPHEN 1 TABLET: 5; 325 TABLET ORAL at 11:07

## 2022-07-11 RX ADMIN — LEVETIRACETAM 500 MG: 500 TABLET, FILM COATED ORAL at 10:07

## 2022-07-11 NOTE — CONSULTS
[facility]  General Surgery  Consult Note    Consults  Subjective:     Chief Complaint/Reason for Admission:  Anemia, ESRD    History of Present Illness:  This is a 32-year-old female with hypertension and uncontrolled diabetes who was admitted with hemolytic anemia, hypertension, and CKD that is progressing to ESRD.  I was consulted for tunneled line placement for outpatient hemodialysis.    No current facility-administered medications on file prior to encounter.     Current Outpatient Medications on File Prior to Encounter   Medication Sig    FLUoxetine 20 MG capsule Take 1 capsule (20 mg total) by mouth once daily.    isosorbide mononitrate (IMDUR) 60 MG 24 hr tablet Take 1 tablet (60 mg total) by mouth once daily.    LANTUS U-100 INSULIN 100 unit/mL injection SMARTSI Unit(s) SUB-Q Every Morning    levETIRAcetam (KEPPRA) 500 MG Tab Take 1 tablet (500 mg total) by mouth 2 (two) times daily.    torsemide (DEMADEX) 20 MG Tab Take 1 tablet (20 mg total) by mouth 2 (two) times a day. (Patient taking differently: Take 20 mg by mouth once daily.)    [DISCONTINUED] furosemide (LASIX) 40 MG tablet Take 1 tablet (40 mg total) by mouth 2 (two) times daily.    [DISCONTINUED] pantoprazole (PROTONIX) 40 MG tablet Take 1 tablet (40 mg total) by mouth once daily.       Review of patient's allergies indicates:   Allergen Reactions    Penicillins Hives       Past Medical History:   Diagnosis Date    CKD (chronic kidney disease), stage IV 2022    Diabetes mellitus due to underlying condition with unspecified complications 2022    Gastroparesis 2022    Heart failure with preserved ejection fraction 2022    EF 55% on 3/22    History of gastroesophageal reflux (GERD)     History of supraventricular tachycardia     Hyperkalemia 2022    Sickle cell trait 2022    Type 2 diabetes mellitus      Past Surgical History:   Procedure Laterality Date     SECTION      x  3    ESOPHAGOGASTRODUODENOSCOPY N/A 10/18/2019    Procedure: ESOPHAGOGASTRODUODENOSCOPY (EGD);  Surgeon: Gianluca Mendez MD;  Location: [facility];  Service: Endoscopy;  Laterality: N/A;     Family History     Problem Relation (Age of Onset)    Diabetes Mother, Father        Tobacco Use    Smoking status: Never Smoker    Smokeless tobacco: Never Used   Substance and Sexual Activity    Alcohol use: No    Drug use: No    Sexual activity: Not Currently     Partners: Male     Birth control/protection: I.U.D.     Review of Systems   Constitutional: Negative for fever.   HENT: Negative.    Eyes: Negative.    Respiratory: Positive for shortness of breath.    Cardiovascular: Negative.    Gastrointestinal: Negative.    Endocrine: Negative.    Genitourinary: Negative.    Musculoskeletal: Positive for back pain.   Skin: Negative.    Allergic/Immunologic: Negative.    Neurological: Negative.    Hematological: Negative.    Psychiatric/Behavioral: Negative.      Objective:     Vital Signs (Most Recent):  Temp: 99 °F (37.2 °C) (07/11/22 0800)  Pulse: 104 (07/11/22 1117)  Resp: (!) 25 (07/11/22 0800)  BP: (!) 179/112 (07/11/22 1117)  SpO2: 97 % (07/11/22 1117) Vital Signs (24h Range):  Temp:  [98.6 °F (37 °C)-99 °F (37.2 °C)] 99 °F (37.2 °C)  Pulse:  [] 104  Resp:  [8-40] 25  SpO2:  [86 %-100 %] 97 %  BP: (120-213)/() 179/112     Weight: 76.2 kg (167 lb 15.9 oz)  Body mass index is 30.73 kg/m².      Intake/Output Summary (Last 24 hours) at 7/11/2022 1255  Last data filed at 7/11/2022 1000  Gross per 24 hour   Intake 542.16 ml   Output 265 ml   Net 277.16 ml       Physical Exam  Constitutional:       General: She is not in acute distress.     Appearance: Normal appearance. She is not ill-appearing, toxic-appearing or diaphoretic.   HENT:      Head: Normocephalic.      Nose: Nose normal.   Eyes:      Conjunctiva/sclera: Conjunctivae normal.   Cardiovascular:      Rate and Rhythm: Normal rate and regular rhythm.    Pulmonary:      Effort: Pulmonary effort is normal.   Abdominal:      Palpations: Abdomen is soft.   Musculoskeletal:         General: Swelling present. Normal range of motion.      Cervical back: Normal range of motion.   Skin:     General: Skin is warm.   Neurological:      General: No focal deficit present.      Mental Status: She is alert.   Psychiatric:         Mood and Affect: Mood normal.         Significant Labs:  CBC:   Recent Labs   Lab 07/11/22 0924   WBC 8.48   RBC 2.27*   HGB 6.6*   HCT 19.1*   PLT 83*   MCV 84   MCH 29.1   MCHC 34.6     CMP:   Recent Labs   Lab 07/11/22 0924   *   CALCIUM 7.8*   ALBUMIN 1.4*   PROT 4.4*      K 4.6   CO2 24      BUN 27*   CREATININE 3.5*   ALKPHOS 83   ALT 11   AST 13   BILITOT 0.8     Coagulation: No results for input(s): PT, INR, APTT in the last 48 hours.  Lactic Acid: No results for input(s): LACTATE in the last 48 hours.      Assessment/Plan:     Active Diagnoses:    Diagnosis Date Noted POA    PRINCIPAL PROBLEM:  Acute renal failure superimposed on chronic kidney disease [N17.9, N18.9] 07/07/2022 Yes    Hypertensive emergency [I16.1] 07/08/2022 Yes    Anemia [D64.9] 07/07/2022 Yes    Seizure [R56.9] 05/29/2022 Yes    CKD (chronic kidney disease), stage IV [N18.4] 04/12/2022 Yes    Sickle cell trait [D57.3] 04/12/2022 Yes    Pericardial effusion [I31.3] 03/07/2022 Yes    Anemia of chronic kidney failure [N18.9, D63.1] 04/24/2021 Yes    Type II diabetes mellitus [E11.9] 10/16/2019 Yes      Problems Resolved During this Admission:    Diagnosis Date Noted Date Resolved POA    Hyperkalemia [E87.5] 07/07/2022 07/08/2022 Yes       32-year-old female who has progressed to ESRD.  Plans for outpatient hemodialysis.  I was consulted for HemoSplit line placement.    Risks benefits were discussed  NPO midnight  Plan for placement tomorrow in the OR.    Thank you for your consult. I will follow-up with patient. Please contact us if you have any  additional questions.    Dionte Gan MD  General Surgery  [facility]

## 2022-07-11 NOTE — PROGRESS NOTES
"INPATIENT NEPHROLOGY PROGRESS  [facility]    Patient Name: Tabby Howard  Date: 2022    Reason for consultation: KANWAL    Chief Complaint:   Chief Complaint   Patient presents with    Abnormal Lab       History of Present Illness:  32 female with a PMHx of DM II, supraventricular tachycardia, heart failure with preserved ejection fraction (22), Sickle cell trait, and CKD (stage IV) who states that she came to the ED because of abnormal labs. She has been having shortness of breathness, feeling lethargic and generalized weakness for few weeks but has been feeling worse.  Patient had lab work done this morning and was informed of abnormal "kidney" results with referral to the ED for further evaluation and work up. She endorses diffuse back pain over the past month, intermittent chills as well as diarrhea a few days ago that has since improved. Patient specifies she is able to urinate but notes that the volume has decreased. Also states that her leg swelling has been getting worse due to her being "full of fluid."  LMP was in 2021. The patient denies fever, N/V, abdominal pain on exam, CP or any other symptoms at this time. PSHx includes EGD and . She was seen by our group during her last hospitalization and was scheduled for f/u with Dr. BATRES next week- previsit labs showed severe anemia and metabolic derangements so she was sent into the hospital.      JUAN A neg, UA with 3+ protein, 1+ blood  - UPCR 10.8    Admit CT AP:  Interval development of right pleural effusion.  Stable prominent pericardial effusion.  Interstitial prominence raising question of pulmonary edema.  Stable abdomen and pelvis with ascites, anasarca.    Interval History:  - hypertensive emergency, Hb 4, K 6.4, CO2 15 in the ER- spoke with ER physician and advised trialysis cath placement for emergent HD with blood transfusion- did HD overnight, started NTG gtt for BP control- switched to " nicardipine gtt overnight- got 2u of blood- washington placed- oliguric, BP better, clearance parameters are better  7/9  Hgb improving, 7.8, post transfusion.  K+ at goal. Appreciate heme-onc input.  Pt fatigued, not conversing much.  No complaints.  Dialysis catheter to be replaced today, pt to have HD following.  7/10  Trialysis eval by gen surgery yesterday, line ok to continue to use--then pt pulled catheter out herself (per RN report), so new line was placed.  Had HD last night, 4L net fluid removal.  To have tunneled catheter placed tomorrow.  136cc UOP recorded.  Off cardene gtt, pressures stable thus far--improving w/fluid removal.    7/11 VSS. UO not great. HD today. Getting tunneled HD cath a well.    Plan of Care:    KANWAL/CKD V- likely now ESRD- history of uncontrolled DMII  Nephrotic syndrome with ansaraca; History of microscopic hematuria  HTN emergency/D CHF/[place] pulm edema/Pericardial effusion/Pleural effusion  Hyperkalemia/Acidosis  Secondary HPT  Hemolytic anemia/Thrombocytopenia; likely component of Anemia of CKD as well    Plan:    - Initiated RRT on 7/7. Anticipate discharge with dialysis with possible outpatient renal biopsy.Continue washington for now. Tunneled cath scheduled for 7/11.  - Off cardene gtt- aim for BP ~150/90- once we get more fluid off, will start to add oral BP medication and diuretics. Renal diet with a 1.5L fluid restriction.   - Will f/u echo.  - Check phos, PTH--acceptable  - Hgb is improved after 2u of blood- heme/onc following for assistance- r/o TTP.    Thank you for allowing us to participate in this patient's care. We will continue to follow.    Vital Signs:  Temp Readings from Last 3 Encounters:   07/11/22 99 °F (37.2 °C) (Oral)   06/11/22 98.2 °F (36.8 °C) (Oral)   06/07/22 98.1 °F (36.7 °C) (Axillary)       Pulse Readings from Last 3 Encounters:   07/11/22 98   06/11/22 100   06/07/22 110       BP Readings from Last 3 Encounters:   07/11/22 (!) 150/89   06/11/22 (!) 173/105    22 129/81       Weight:  Wt Readings from Last 3 Encounters:   07/10/22 76.2 kg (167 lb 15.9 oz)   22 73.9 kg (163 lb)   22 74.2 kg (163 lb 9.3 oz)       INS/OUTS:  I/O last 3 completed shifts:  In: 1027 [P.O.:360; I.V.:167; Other:500]  Out: 4780 [Urine:280; Other:4500]  No intake/output data recorded.    Past Medical & Surgical History:  Past Medical History:   Diagnosis Date    CKD (chronic kidney disease), stage IV 2022    Diabetes mellitus due to underlying condition with unspecified complications 2022    Gastroparesis 2022    Heart failure with preserved ejection fraction 2022    EF 55% on 3/22    History of gastroesophageal reflux (GERD)     History of supraventricular tachycardia     Hyperkalemia 2022    Sickle cell trait 2022    Type 2 diabetes mellitus        Past Surgical History:   Procedure Laterality Date     SECTION      x 3    ESOPHAGOGASTRODUODENOSCOPY N/A 10/18/2019    Procedure: ESOPHAGOGASTRODUODENOSCOPY (EGD);  Surgeon: Gianluca Mendez MD;  Location: [facility];  Service: Endoscopy;  Laterality: N/A;       Past Social History:  Social History     Socioeconomic History    Marital status:    Tobacco Use    Smoking status: Never Smoker    Smokeless tobacco: Never Used   Substance and Sexual Activity    Alcohol use: No    Drug use: No    Sexual activity: Not Currently     Partners: Male     Birth control/protection: I.U.D.       Medications:  Scheduled Meds:   amLODIPine  10 mg Oral Daily    epoetin teresa-epbx  10,000 Units Intravenous Every Mon, Wed, Fri    famotidine  20 mg Oral Daily    heparin (porcine)  4,000 Units Intra-Catheter Once    isosorbide mononitrate  60 mg Oral Daily    levETIRAcetam  500 mg Oral BID    mupirocin   Nasal BID     Continuous Infusions:   nicardipine Stopped (22 0243)     PRN Meds:.sodium chloride, acetaminophen-codeine 300-30mg, dextrose 10%, dextrose 10%, dextrose 10%, glucagon  "(human recombinant), glucose, glucose, heparin (porcine), HYDROcodone-acetaminophen, insulin aspart U-100, labetaloL, melatonin, sodium chloride 0.9%  No current facility-administered medications on file prior to encounter.     Current Outpatient Medications on File Prior to Encounter   Medication Sig Dispense Refill    FLUoxetine 20 MG capsule Take 1 capsule (20 mg total) by mouth once daily. 30 capsule 1    isosorbide mononitrate (IMDUR) 60 MG 24 hr tablet Take 1 tablet (60 mg total) by mouth once daily. 30 tablet 1    LANTUS U-100 INSULIN 100 unit/mL injection SMARTSI Unit(s) SUB-Q Every Morning      levETIRAcetam (KEPPRA) 500 MG Tab Take 1 tablet (500 mg total) by mouth 2 (two) times daily. 60 tablet 1    torsemide (DEMADEX) 20 MG Tab Take 1 tablet (20 mg total) by mouth 2 (two) times a day. (Patient taking differently: Take 20 mg by mouth once daily.) 60 tablet 1    [DISCONTINUED] furosemide (LASIX) 40 MG tablet Take 1 tablet (40 mg total) by mouth 2 (two) times daily. 60 tablet 0    [DISCONTINUED] pantoprazole (PROTONIX) 40 MG tablet Take 1 tablet (40 mg total) by mouth once daily. 30 tablet 3       Allergies:  Penicillins    Past Family History:  Reviewed; refer to Hospitalist Admission Note    Review of Systems:  Review of Systems - All 14 systems reviewed and negative, except as noted in HPI    Physical Exam:  BP (!) 150/89   Pulse 98   Temp 99 °F (37.2 °C) (Oral)   Resp (!) 25   Ht 5' 2" (1.575 m)   Wt 76.2 kg (167 lb 15.9 oz)   LMP 2021 (Approximate)   SpO2 (!) 92%   Breastfeeding No   BMI 30.73 kg/m²     General Appearance:    NAD, AAO x 3, cooperative, appears stated age   Head:    Normocephalic, atraumatic   Eyes:    PER, EOMI, and conjunctiva/sclera clear bilaterally        Mouth:   Moist mucus membranes   Back:     No CVA tenderness   Lungs:     Rales   Heart:    Regular rate and rhythm, S1 and S2 normal, no murmur, rub   or gallop   Abdomen:     Soft, non-tender, " non-distended, bowel sounds active all four   quadrants, no RT or guarding, no masses, no organomegaly   Extremities:   Warm and well perfused, anasarca   MSK:   No joint or muscle swelling, tenderness or deformity   Skin:   Skin color, texture, turgor normal, no rashes or lesions   Neurologic/Psychiatric:   CNII-XII intact, normal strength and sensation       throughout, no asterixis; normal affect, memory, judgement     and insight     Results:  Recent Labs   Lab 07/08/22  0516 07/09/22  0231 07/10/22  0315     137 137 138   K 4.8  4.8 4.1 4.0     110 107 106   CO2 20*  20* 22* 23   BUN 31*  31* 21* 18   CREATININE 2.9*  2.9* 2.5* 2.5*   ESTGFRAFRICA 24*  24* 28* 28*   EGFRNONAA 20*  20* 25* 25*   *  114* 187* 129*       Recent Labs   Lab 07/07/22  0853 07/07/22  1451 07/08/22  0516 07/08/22  1517 07/09/22  0231 07/10/22  0315   CALCIUM 8.2*   < > 8.0*  8.0*  --  7.8* 7.6*   ALBUMIN 2.1*  --  2.0* 2.0*  --   --    PHOS 4.4  --   --   --   --   --     < > = values in this interval not displayed.       Recent Labs   Lab 07/08/22  0516   PTH, Intact 289.8 H       Recent Labs   Lab 07/07/22  1640 07/08/22  0053 07/08/22  0525 07/08/22  2032 07/09/22  0800 07/09/22  1140 07/09/22  2150 07/10/22  1153 07/10/22  2025 07/11/22  0523   POCTGLUCOSE 101 103 130* 176* 185* 136* 129* 132* 158* 148*       Recent Labs   Lab 01/26/22  0240 03/06/22  1135 05/15/22  1954   Hemoglobin A1C 6.8 H 5.3 5.8 H       Recent Labs   Lab 07/09/22  0231 07/09/22  1655 07/10/22  0315   WBC 4.34 7.96 7.99   HGB 7.8* 8.5* 7.4*   HCT 22.3* 23.5* 21.1*   PLT 98* 105* 99*   MCV 83 82 83   MCHC 35.0 36.2* 35.1   MONO 7.8  0.3 8.0  0.6 7.4  0.6       Recent Labs   Lab 07/07/22  0853 07/08/22  0516 07/08/22  1517   BILITOT  --  2.1* 1.2*   BILIDIR  --   --  0.4*   PROT  --  4.9* 5.1*   ALBUMIN 2.1* 2.0* 2.0*   ALKPHOS  --  76 86   ALT  --  16 15   AST  --  17 17       Recent Labs   Lab 05/28/22 2036 06/11/22  1116  07/07/22  0912   Color, UA Yellow Yellow Yellow   Appearance, UA Clear Clear Clear   pH, UA 7.0 7.0 6.0   Specific [place], UA 1.020 1.015 1.020   Protein, UA 3+ A 3+ A 3+ A   Glucose, UA 2+ A 2+ A Negative   Ketones, UA Negative Negative Negative   Urobilinogen, UA Negative Negative Negative   Bilirubin (UA) Negative Negative Negative   Occult Blood UA 2+ A 1+ A 2+ A   Nitrite, UA Negative Negative Negative   RBC, UA 5 H 3 3   WBC, UA 1 4 8 H   Bacteria None Occasional Rare   Hyaline Casts, UA 0 0 0       Recent Labs   Lab 12/13/20  1042 04/23/21  1952 04/23/21 2006 04/11/22  2043 04/11/22  2345 04/12/22  0211   POC PH 7.457 H 7.458 H  --  7.365  --   --    POC PCO2 32.6 L 32.9 L  --  39.5  --   --    POC HCO3 23.0 L 23.3 L  --  22.6 L  --   --    POC PO2 26 LL 24 LL  --  25 L  --   --    POC SATURATED O2 53 L 47 L  --  42 L  --   --    POC BE -1 -1  --  -3  --   --    Sample VENOUS VENOUS   < > VENOUS VENOUS VENOUS    < > = values in this interval not displayed.     I have spent > minutes providing care for this patient for the above diagnoses. These services have included chart/data/imaging review, evaluation, exam, formulation of plan, , note preparation, and discussions with staff involved in this patient's care.    Mando Benitez MD    [facility] [address] [address]  [phone] (p)  [phone] (f)

## 2022-07-11 NOTE — PROGRESS NOTES
Report called to Agustin on 3rd [facility]. Patient to be transferred to room 306.

## 2022-07-11 NOTE — ASSESSMENT & PLAN NOTE
On  Cardene drip  Started on amlodipine and isosorbide mononitrate  7/11 increased amlodipine dose

## 2022-07-11 NOTE — PLAN OF CARE
Dialysis referral [facility] for [facility]       07/11/22 6704   Post-Acute Status   Post-Acute Authorization Dialysis   Diaylsis Status Referrals Sent

## 2022-07-11 NOTE — NURSING
Received to room 306 after report received from Subha Arellano RN  Patient AAOX4 Vss afebrile blood transfusion complete before leaving ICU for transfer to 306 Denies pain no acute distress noted NPO after midnight tonight Pelayo catheter in place and to remain in place over night Per Dr Benitez orders,

## 2022-07-11 NOTE — PLAN OF CARE
Patient signed patient choice form for OP dialysis       07/11/22 4818   Post-Acute Status   Post-Acute Authorization Dialysis   Patient choice form signed by patient/caregiver List with quality metrics by geographic area provided

## 2022-07-11 NOTE — SUBJECTIVE & OBJECTIVE
Interval History: On cardene drip. Plan for tunneled cath today. Hgb 6.6, 1 units PRBCs ordered.    Review of Systems   Constitutional:  Positive for fatigue. Negative for chills.   HENT:  Negative for sore throat.    Respiratory:  Negative for cough and shortness of breath.    Gastrointestinal:  Negative for abdominal pain, nausea and vomiting.   Genitourinary:  Negative for decreased urine volume.   Musculoskeletal:  Negative for back pain and neck pain.   Skin:  Positive for pallor.   Neurological:  Positive for weakness. Negative for seizures and headaches.   Psychiatric/Behavioral:  Negative for behavioral problems.    Objective:     Vital Signs (Most Recent):  Temp: 99 °F (37.2 °C) (07/11/22 0800)  Pulse: 98 (07/11/22 0800)  Resp: (!) 25 (07/11/22 0800)  BP: (!) 150/89 (07/11/22 0800)  SpO2: (!) 92 % (07/11/22 0800)   Vital Signs (24h Range):  Temp:  [98 °F (36.7 °C)-99 °F (37.2 °C)] 99 °F (37.2 °C)  Pulse:  [] 98  Resp:  [8-40] 25  SpO2:  [86 %-100 %] 92 %  BP: (120-213)/() 150/89     Weight: 76.2 kg (167 lb 15.9 oz)  Body mass index is 30.73 kg/m².    Intake/Output Summary (Last 24 hours) at 7/11/2022 1001  Last data filed at 7/11/2022 0500  Gross per 24 hour   Intake 302.16 ml   Output 180 ml   Net 122.16 ml        Physical Exam  Vitals and nursing note reviewed.   Constitutional:       General: She is not in acute distress.     Appearance: She is well-developed. She is ill-appearing. She is not diaphoretic.      Comments: Anasarca.   HENT:      Head: Normocephalic and atraumatic.      Right Ear: External ear normal.      Left Ear: External ear normal.      Nose: Nose normal.      Mouth/Throat:      Mouth: Mucous membranes are moist.   Eyes:      General: No scleral icterus.     Extraocular Movements: Extraocular movements intact.      Conjunctiva/sclera: Conjunctivae normal.      Pupils: Pupils are equal, round, and reactive to light.   Neck:      Vascular: No JVD.   Cardiovascular:      Rate  and Rhythm: Normal rate and regular rhythm.      Pulses: Normal pulses.      Heart sounds: Normal heart sounds. No murmur heard.    No friction rub. No gallop.   Pulmonary:      Effort: Pulmonary effort is normal. No respiratory distress.      Breath sounds: Normal breath sounds. No wheezing or rales.   Abdominal:      General: Bowel sounds are normal. There is no distension.      Palpations: Abdomen is soft.      Tenderness: There is no abdominal tenderness. There is no guarding or rebound.   Genitourinary:     Comments: Pelayo.  Musculoskeletal:         General: No tenderness. Normal range of motion.      Cervical back: Neck supple.      Right lower leg: Edema present.      Left lower leg: Edema present.   Skin:     General: Skin is warm and dry.      Coloration: Skin is not pale.      Findings: No erythema or rash.   Neurological:      Mental Status: She is oriented to person, place, and time.      Cranial Nerves: No cranial nerve deficit.      Motor: No weakness.   Psychiatric:         Mood and Affect: Mood normal.         Behavior: Behavior normal.       Significant Labs: All pertinent labs within the past 24 hours have been reviewed.  CBC:   Recent Labs   Lab 07/09/22  1655 07/10/22  0315 07/11/22  0924   WBC 7.96 7.99 8.48   HGB 8.5* 7.4* 6.6*   HCT 23.5* 21.1* 19.1*   * 99* 83*       CMP:   Recent Labs   Lab 07/10/22  0315      K 4.0      CO2 23   *   BUN 18   CREATININE 2.5*   CALCIUM 7.6*   ANIONGAP 9   EGFRNONAA 25*         Significant Imaging: I have reviewed all pertinent imaging results/findings within the past 24 hours.

## 2022-07-11 NOTE — PROGRESS NOTES
"[facility]  Hematology/Oncology  Progress Note    Patient Name: Tabby Howard  Admission Date: 7/7/2022  Hospital Length of Stay: 3 days  Code Status: Prior     Subjective:   Pt on cell phone, just had BM,  Interval History:   32 y.o. female with a PMHx of DM II, supraventricular tachycardia, heart failure with preserved ejection fraction (04/12/22), Sickle cell trait, and CKD (stage IV) sent to the ED because of abnormal labs.   c/o shortness of breathness, feeling lethargic and generalized weakness for few weeks but has been feeling worse.   c/o diffuse back pain over the past month, intermittent chills as well as diarrhea a few days ago that has since improved.  Patient specifies she is able to urinate but notes that the volume has decreased.  Also states that her  leg swelling has been getting worse due to her being "full of fluid."  LMP was in December 2021.  per med rec.  Patient reports she had irregular menstrual cycles up until December.  She has been receiving intermittent blood transfusions  hospital since December     Oncology Consult:  We have been consulted for severe anemia.  On arrival she was noted to have a hemoglobin of 4.9.  She is now status post 2 units packed red blood cell.  Patient reports she seen a hematologist years ago and was told she had sickle cell trait.      Medications:  Continuous Infusions:   nicardipine Stopped (07/10/22 1445)     Scheduled Meds:   amLODIPine  5 mg Oral Daily    [START ON 7/11/2022] epoetin teresa-epbx  10,000 Units Intravenous Every Mon, Wed, Fri    famotidine  20 mg Oral Daily    heparin (porcine)  4,000 Units Intra-Catheter Once    isosorbide mononitrate  60 mg Oral Daily    levETIRAcetam  500 mg Oral BID    mupirocin   Nasal BID     PRN Meds:sodium chloride, acetaminophen-codeine 300-30mg, dextrose 10%, dextrose 10%, dextrose 10%, glucagon (human recombinant), glucose, glucose, heparin (porcine), HYDROcodone-acetaminophen, insulin " aspart U-100, labetaloL, melatonin, sodium chloride 0.9%       Objective:     Vital Signs (Most Recent):  Temp: 98.6 °F (37 °C) (07/10/22 2015)  Pulse: (!) 112 (07/10/22 1906)  Resp: (!) 26 (07/10/22 2021)  BP: (!) 151/95 (07/10/22 1900)  SpO2: (!) 93 % (07/10/22 1906) Vital Signs (24h Range):  Temp:  [98 °F (36.7 °C)-99 °F (37.2 °C)] 98.6 °F (37 °C)  Pulse:  [] 112  Resp:  [8-38] 26  SpO2:  [89 %-100 %] 93 %  BP: (120-213)/() 151/95     Weight: 76.2 kg (167 lb 15.9 oz)  Body mass index is 30.73 kg/m².  Body surface area is 1.83 meters squared.      Intake/Output Summary (Last 24 hours) at 7/10/2022 2229  Last data filed at 7/10/2022 1830  Gross per 24 hour   Intake 1009.84 ml   Output 4585 ml   Net -3575.16 ml       Physical Exam  Physical Exam  GENERAL: Comfortable looking patient.  Tearful  Awake, alert and oriented to time, person and place.  No anxiety, or agitation.       HEENT: Normal conjunctivae and eyelids. WNL.  PERRLA 3 to 4 mm. No icterus, no pallor, no congestion, and no discharge noted.      NECK:  Supple. Trachea is central.  No crepitus.  No JVD or masses.     RESPIRATORY:  No intercostal retractions.  No dullness to percussion.  Chest is clear to auscultation.  No rales, rhonchi or wheezes.  No crepitus.  Good air entry bilaterally.     CARDIOVASCULAR:  S1 and S2 are normally heard without murmurs or gallops.  All peripheral pulses are present.     ABDOMEN:  Normal abdomen.  No hepatosplenomegaly.  No free fluid.  Bowel sounds are present.  No hernia noted. No masses.  No rebound or tenderness.  No guarding or rigidity.  Umbilicus is midline.     LYMPHATICS:  No axillary, cervical, supraclavicular, submental, or inguinal lymphadenopathy.     SKIN/MUSCULOSKELETAL:  There is no evidence of excoriation marks or ecchmosis.  No rashes.  No cyanosis.  No clubbing.  No joint or skeletal deformities noted.  Normal range of motion. + edema noted in bilateral lower extremities     NEUROLOGIC:   Higher functions are appropriate.  No cranial nerve deficits.  Normal jatin.  Normal strength.  Motor and sensory functions are normal.  Deep tendon reflexes are normal.     GENITAL/RECTAL:  Exams are deferred  Significant Labs:   Lab Results   Component Value Date    WBC 7.99 07/10/2022    HGB 7.4 (L) 07/10/2022    HCT 21.1 (L) 07/10/2022    MCV 83 07/10/2022    PLT 99 (L) 07/10/2022     CMP  Sodium   Date Value Ref Range Status   07/10/2022 138 136 - 145 mmol/L Final     Potassium   Date Value Ref Range Status   07/10/2022 4.0 3.5 - 5.1 mmol/L Final     Chloride   Date Value Ref Range Status   07/10/2022 106 95 - 110 mmol/L Final     CO2   Date Value Ref Range Status   07/10/2022 23 23 - 29 mmol/L Final     Glucose   Date Value Ref Range Status   07/10/2022 129 (H) 70 - 110 mg/dL Final     BUN   Date Value Ref Range Status   07/10/2022 18 6 - 20 mg/dL Final     Creatinine   Date Value Ref Range Status   07/10/2022 2.5 (H) 0.5 - 1.4 mg/dL Final     Calcium   Date Value Ref Range Status   07/10/2022 7.6 (L) 8.7 - 10.5 mg/dL Final     Total Protein   Date Value Ref Range Status   07/08/2022 5.1 (L) 6.0 - 8.4 g/dL Final     Albumin   Date Value Ref Range Status   07/08/2022 2.0 (L) 3.5 - 5.2 g/dL Final     Total Bilirubin   Date Value Ref Range Status   07/08/2022 1.2 (H) 0.1 - 1.0 mg/dL Final     Comment:     For infants and newborns, interpretation of results should be based  on gestational age, weight and in agreement with clinical  observations.    Premature Infant recommended reference ranges:  Up to 24 hours.............<8.0 mg/dL  Up to 48 hours............<12.0 mg/dL  3-5 days..................<15.0 mg/dL  6-29 days.................<15.0 mg/dL       Alkaline Phosphatase   Date Value Ref Range Status   07/08/2022 86 55 - 135 U/L Final     AST   Date Value Ref Range Status   07/08/2022 17 10 - 40 U/L Final     ALT   Date Value Ref Range Status   07/08/2022 15 10 - 44 U/L Final     Anion Gap   Date Value Ref  Range Status   07/10/2022 9 8 - 16 mmol/L Final     eGFR if    Date Value Ref Range Status   07/10/2022 28 (A) >60 mL/min/1.73 m^2 Final     eGFR if non    Date Value Ref Range Status   07/10/2022 25 (A) >60 mL/min/1.73 m^2 Final     Comment:     Calculation used to obtain the estimated glomerular filtration  rate (eGFR) is the CKD-EPI equation.              Assessment/Plan:     Active Diagnoses:    Diagnosis Date Noted POA    PRINCIPAL PROBLEM:  Acute renal failure superimposed on chronic kidney disease [N17.9, N18.9] 07/07/2022 Yes    Hypertensive emergency [I16.1] 07/08/2022 Yes    Anemia [D64.9] 07/07/2022 Yes    Seizure [R56.9] 05/29/2022 Yes    CKD (chronic kidney disease), stage IV [N18.4] 04/12/2022 Yes    Sickle cell trait [D57.3] 04/12/2022 Yes    Pericardial effusion [I31.3] 03/07/2022 Yes    Anemia of chronic kidney failure [N18.9, D63.1] 04/24/2021 Yes    Type II diabetes mellitus [E11.9] 10/16/2019 Yes      Problems Resolved During this Admission:    Diagnosis Date Noted Date Resolved POA    Hyperkalemia [E87.5] 07/07/2022 07/08/2022 Yes     Anemia:  Concerning for hemolytic anemia post transfusion hemoglobin is increased to 8.5  Now dwindling again   dialysed yesterday.  MCV is normal.  Ferritin is over 3000. Haptoglobin less than 10 patient's total protein is also decreased .  G6PD vitamin B12 hemoglobin electrophoresis I pending  Jemal test is negative.  Total bilirubin is slightly elevated it was 2.1.  LDH is elevated at 446 and retic count is elevated be suggest hemolysis however patient has known sickle cell trait/anemia  Will continue to follow transfuse as needed wait for rest of workup to return  -we will follow-up all results  -transfuse 1 unit packed red blood cell for hemoglobin less than 7     Acute renal failure on chronic kidney disease:  Creatinine is improving  Managed by Nephrology        Uncontrolled hypertension:  Managed by hospitalist  currently on Savannah Bland MD  Hematology/Oncology  [facility]

## 2022-07-11 NOTE — PLAN OF CARE
Remains in ICU.   Off cardene gtt until 01:30, then started at 2mg/hr for SBP >160.  NSR on telemetry.  Afebrile.  150 ml urine output.  NPO after breakfast for possible tunneled cath placement.      Problem: Infection  Goal: Absence of Infection Signs and Symptoms  Outcome: Ongoing, Progressing     Problem: Device-Related Complication Risk (Hemodialysis)  Goal: Safe, Effective Therapy Delivery  Outcome: Ongoing, Progressing     Problem: Hemodynamic Instability (Hemodialysis)  Goal: Effective Tissue Perfusion  Outcome: Ongoing, Progressing     Problem: Infection (Hemodialysis)  Goal: Absence of Infection Signs and Symptoms  Outcome: Ongoing, Progressing     Problem: Adult Inpatient Plan of Care  Goal: Plan of Care Review  Outcome: Ongoing, Progressing  Goal: Patient-Specific Goal (Individualized)  Outcome: Ongoing, Progressing     Problem: Diabetes Comorbidity  Goal: Blood Glucose Level Within Targeted Range  Outcome: Ongoing, Progressing     Problem: Renal Function Impairment (Acute Kidney Injury/Impairment)  Goal: Effective Renal Function  Outcome: Ongoing, Progressing

## 2022-07-11 NOTE — PROGRESS NOTES
"[facility] Medicine  Progress Note    Patient Name: Tabby Howard  MRN: [id number]  Patient Class: IP- Inpatient   Admission Date: 2022  Length of Stay: 4 days  Attending Physician: Linda Ceuto MD  Primary Care Provider: Primary Doctor No        Subjective:     Principal Problem:Acute renal failure superimposed on chronic kidney disease        HPI:  Tabby Howard is a 32 y.o. female with a PMHx of DM II, supraventricular tachycardia, heart failure with preserved ejection fraction (22), Sickle cell trait, and CKD (stage IV) who states that she came to the ED because of abnormal labs. She has been having shortness of breathness, feeling lethargic and generalized weakness for few weeks but has been feeling worse.  Patient had lab work done this morning and was informed of abnormal "kidney" results with referral to the ED for further evaluation and work up.  She endorses diffuse back pain over the past month, intermittent chills as well as diarrhea a few days ago that has since improved.  Patient specifies she is able to urinate but notes that the volume has decreased.  Also states that her  leg swelling has been getting worse due to her being "full of fluid."  LMP was in 2021.  The patient denies fever, N/V, abdominal pain on exam, CP or any other symptoms at this time.  PSHx includes EGD and .  Does not see a nephrologist for her CKD     The history is provided by the patient.          Overview/Hospital Course:  Patient was admitted for acute on chronic kidney disease with hyperkalemia and acidosis.  Patient was also found to be in hypertensive emergency.  On arrival patient was anemic Hb 4. Patient was transfused 2 units of blood with her new dialysis.  On arrival K 6.4, CO2 15. Nephrology was consulted patient was started on hemodialysis after dialysis access was placed.  For blood pressure control started NTG gtt and when blood pressure was not " controlled with nitro drip patient was switched to nicardipine gtt patient continued to be oliguric, hemoglobin improved after cut transition, hematology was consulted for further evaluation of anemia.  Workup showed hemolytic anemia which is attributed hemolysis during hypertensive emergency.  Patient's potassium improved acidosis improved after hemodialysis.       Interval History: On cardene drip. Plan for tunneled cath today. Hgb 6.6, 1 units PRBCs ordered.    Review of Systems   Constitutional:  Positive for fatigue. Negative for chills.   HENT:  Negative for sore throat.    Respiratory:  Negative for cough and shortness of breath.    Gastrointestinal:  Negative for abdominal pain, nausea and vomiting.   Genitourinary:  Negative for decreased urine volume.   Musculoskeletal:  Negative for back pain and neck pain.   Skin:  Positive for pallor.   Neurological:  Positive for weakness. Negative for seizures and headaches.   Psychiatric/Behavioral:  Negative for behavioral problems.    Objective:     Vital Signs (Most Recent):  Temp: 99 °F (37.2 °C) (07/11/22 0800)  Pulse: 98 (07/11/22 0800)  Resp: (!) 25 (07/11/22 0800)  BP: (!) 150/89 (07/11/22 0800)  SpO2: (!) 92 % (07/11/22 0800)   Vital Signs (24h Range):  Temp:  [98 °F (36.7 °C)-99 °F (37.2 °C)] 99 °F (37.2 °C)  Pulse:  [] 98  Resp:  [8-40] 25  SpO2:  [86 %-100 %] 92 %  BP: (120-213)/() 150/89     Weight: 76.2 kg (167 lb 15.9 oz)  Body mass index is 30.73 kg/m².    Intake/Output Summary (Last 24 hours) at 7/11/2022 1001  Last data filed at 7/11/2022 0500  Gross per 24 hour   Intake 302.16 ml   Output 180 ml   Net 122.16 ml        Physical Exam  Vitals and nursing note reviewed.   Constitutional:       General: She is not in acute distress.     Appearance: She is well-developed. She is ill-appearing. She is not diaphoretic.      Comments: Anasarca.   HENT:      Head: Normocephalic and atraumatic.      Right Ear: External ear normal.      Left Ear:  External ear normal.      Nose: Nose normal.      Mouth/Throat:      Mouth: Mucous membranes are moist.   Eyes:      General: No scleral icterus.     Extraocular Movements: Extraocular movements intact.      Conjunctiva/sclera: Conjunctivae normal.      Pupils: Pupils are equal, round, and reactive to light.   Neck:      Vascular: No JVD.   Cardiovascular:      Rate and Rhythm: Normal rate and regular rhythm.      Pulses: Normal pulses.      Heart sounds: Normal heart sounds. No murmur heard.    No friction rub. No gallop.   Pulmonary:      Effort: Pulmonary effort is normal. No respiratory distress.      Breath sounds: Normal breath sounds. No wheezing or rales.   Abdominal:      General: Bowel sounds are normal. There is no distension.      Palpations: Abdomen is soft.      Tenderness: There is no abdominal tenderness. There is no guarding or rebound.   Genitourinary:     Comments: Pelayo.  Musculoskeletal:         General: No tenderness. Normal range of motion.      Cervical back: Neck supple.      Right lower leg: Edema present.      Left lower leg: Edema present.   Skin:     General: Skin is warm and dry.      Coloration: Skin is not pale.      Findings: No erythema or rash.   Neurological:      Mental Status: She is oriented to person, place, and time.      Cranial Nerves: No cranial nerve deficit.      Motor: No weakness.   Psychiatric:         Mood and Affect: Mood normal.         Behavior: Behavior normal.       Significant Labs: All pertinent labs within the past 24 hours have been reviewed.  CBC:   Recent Labs   Lab 07/09/22  1655 07/10/22  0315 07/11/22  0924   WBC 7.96 7.99 8.48   HGB 8.5* 7.4* 6.6*   HCT 23.5* 21.1* 19.1*   * 99* 83*       CMP:   Recent Labs   Lab 07/10/22  0315      K 4.0      CO2 23   *   BUN 18   CREATININE 2.5*   CALCIUM 7.6*   ANIONGAP 9   EGFRNONAA 25*         Significant Imaging: I have reviewed all pertinent imaging results/findings within the past 24  hours.      Assessment/Plan:      * Acute renal failure superimposed on chronic kidney disease  New HD  Follow renal panel and electrolytes closely.  Follow renal recommendations.  CT r/o renal stone  Adjust renal dose medications for creatinine clearance 10-50cc/min.  Avoid NSAIDs, De Leon-II inhibitors, ACE-I, Angiotensin Receptor Blockers, or Aminoglycosides.  Urine analysis.  Will defer following workup to nephro   JUAN A, C3, C4, ESR, UPEP and SPEP  Tunneled cath 7/11                  Hypertensive emergency  On  Cardene drip  Started on amlodipine and isosorbide mononitrate  7/11 increased amlodipine dose    Anemia  Patient's anemia is currently controlled. Has recieved 2 units of PRBCs on 7/7. Etiology likely d/t Anemia of acute illness + Hemolytic anemia  Retic count elevated   Will cont to trend HnH   Current CBC reviewed-   Lab Results   Component Value Date    HGB 6.6 (L) 07/11/2022    HCT 19.1 (LL) 07/11/2022     Monitor serial CBC and transfuse if patient becomes hemodynamically unstable, symptomatic or H/H drops below 7/21.   7/11 Transfuse 1 unit PRBC      Pericardial effusion  ECHO shows · Moderate circumferential pericardial effusion.     The left ventricle is normal in size with moderate concentric hypertrophy and low normal systolic function.  · The estimated ejection fraction is 50%.  · Normal left ventricular diastolic function.  · Normal right ventricular size with normal right ventricular systolic function.          Seizure  Hx noted      Sickle cell trait  Hx noted        CKD (chronic kidney disease), stage IV  Hx noted      Anemia of chronic kidney failure  Hx noted  Ml 2/2  hypertensive emergency and acute illness   S/p  2 units   Patient's anemia is currently controlled. Has recieved 2 units of PRBCs on 7/7.   Current CBC reviewed-   Lab Results   Component Value Date    HGB 6.6 (L) 07/11/2022    HCT 19.1 (LL) 07/11/2022     Monitor serial CBC and transfuse if patient becomes hemodynamically  unstable, symptomatic or H/H drops below 7/21.         Type II diabetes mellitus     Patient's FSGs are controlled on current medication regimen.  Last A1c reviewed-   Lab Results   Component Value Date    HGBA1C 5.8 (H) 05/15/2022     Most recent fingerstick glucose reviewed-   Recent Labs   Lab 07/08/22 2032 07/09/22  0800   POCTGLUCOSE 176* 185*     Current correctional scale  Medium  Maintain anti-hyperglycemic dose as follows-   Antihyperglycemics (From admission, onward)            Start     Stop Route Frequency Ordered    07/07/22 1719  insulin aspart U-100 pen 1-10 Units         -- SubQ Before meals & nightly PRN 07/07/22 1619        Hold Oral hypoglycemics while patient is in the hospital.          VTE Risk Mitigation (From admission, onward)         Ordered     heparin (porcine) injection 4,000 Units  As needed (PRN)         07/09/22 2022     IP VTE HIGH RISK PATIENT  Once         07/09/22 1957     Place JOCELYNE hose  Until discontinued         07/09/22 1957     heparin (porcine) injection 4,000 Units  Once         07/08/22 1624                Discharge Planning   TATIANA: 7/11/2022     Code Status: Prior   Is the patient medically ready for discharge?:     Reason for patient still in hospital (select all that apply): Patient trending condition and Treatment  Discharge Plan A: Home with family            Critical care time spent on the evaluation and treatment of severe organ dysfunction, review of pertinent labs and imaging studies, discussions with consulting providers and discussions with patient/family: 35 minutes.      Linda Cueto MD  Department of Hospital Medicine   [facility]

## 2022-07-11 NOTE — ASSESSMENT & PLAN NOTE
Hx noted  Ml 2/2  hypertensive emergency and acute illness   S/p  2 units   Patient's anemia is currently controlled. Has recieved 2 units of PRBCs on 7/7.   Current CBC reviewed-   Lab Results   Component Value Date    HGB 6.6 (L) 07/11/2022    HCT 19.1 (LL) 07/11/2022     Monitor serial CBC and transfuse if patient becomes hemodynamically unstable, symptomatic or H/H drops below 7/21.

## 2022-07-11 NOTE — ASSESSMENT & PLAN NOTE
Patient's anemia is currently controlled. Has recieved 2 units of PRBCs on 7/7. Etiology likely d/t Anemia of acute illness + Hemolytic anemia  Retic count elevated   Will cont to trend Hn   Current CBC reviewed-   Lab Results   Component Value Date    HGB 6.6 (L) 07/11/2022    HCT 19.1 (LL) 07/11/2022     Monitor serial CBC and transfuse if patient becomes hemodynamically unstable, symptomatic or H/H drops below 7/21.   7/11 Transfuse 1 unit PRBC

## 2022-07-11 NOTE — PLAN OF CARE
Care plan reviewed. Safety maintained. Patient was NPO today for hema split placement today, was changed to tomorrow at 1300. Renal diet at this time, fair to poor appetite. Fluid restriction discussed with her, she verbalized understanding. Pelayo to remain in place per Nephrology until tomorrow for strict I&O. No BM. Blood transfusion infusing. Patient with no visitors today but was talking a lot on her phone. Patient seen by Dr. Cueto, Dr. Benitez and Dr. Gan.

## 2022-07-11 NOTE — CARE UPDATE
07/11/22 0701   Patient Assessment/Suction   Level of Consciousness (AVPU) alert   Respiratory Effort Normal;Unlabored   All Lung Fields Breath Sounds clear;equal bilaterally;diminished   Rhythm/Pattern, Respiratory unlabored   PRE-TX-O2   O2 Device (Oxygen Therapy) nasal cannula   $ Is the patient on Low Flow Oxygen? Yes   Flow (L/min) 3   SpO2 97 %   Pulse Oximetry Type Continuous   $ Pulse Oximetry - Multiple Charge Pulse Oximetry - Multiple   Pulse 105   Resp (!) 29

## 2022-07-12 ENCOUNTER — ANESTHESIA (OUTPATIENT)
Dept: SURGERY | Facility: HOSPITAL | Age: 32
DRG: 683 | End: 2022-07-12
Payer: MEDICAID

## 2022-07-12 LAB
ALBUMIN SERPL BCP-MCNC: 1.7 G/DL (ref 3.5–5.2)
ALP SERPL-CCNC: 108 U/L (ref 55–135)
ALT SERPL W/O P-5'-P-CCNC: 13 U/L (ref 10–44)
ANION GAP SERPL CALC-SCNC: 10 MMOL/L (ref 8–16)
AST SERPL-CCNC: 16 U/L (ref 10–40)
BASOPHILS # BLD AUTO: 0.02 K/UL (ref 0–0.2)
BASOPHILS NFR BLD: 0.3 % (ref 0–1.9)
BILIRUB SERPL-MCNC: 1 MG/DL (ref 0.1–1)
BUN SERPL-MCNC: 23 MG/DL (ref 6–20)
CALCIUM SERPL-MCNC: 8.2 MG/DL (ref 8.7–10.5)
CHLORIDE SERPL-SCNC: 105 MMOL/L (ref 95–110)
CO2 SERPL-SCNC: 20 MMOL/L (ref 23–29)
CREAT SERPL-MCNC: 2.8 MG/DL (ref 0.5–1.4)
CTP QC/QA: YES
DIFFERENTIAL METHOD: ABNORMAL
EOSINOPHIL # BLD AUTO: 0 K/UL (ref 0–0.5)
EOSINOPHIL NFR BLD: 0.1 % (ref 0–8)
ERYTHROCYTE [DISTWIDTH] IN BLOOD BY AUTOMATED COUNT: 14.3 % (ref 11.5–14.5)
EST. GFR  (AFRICAN AMERICAN): 25 ML/MIN/1.73 M^2
EST. GFR  (NON AFRICAN AMERICAN): 21 ML/MIN/1.73 M^2
GLUCOSE SERPL-MCNC: 248 MG/DL (ref 70–110)
HCT VFR BLD AUTO: 27.1 % (ref 37–48.5)
HGB BLD-MCNC: 9.6 G/DL (ref 12–16)
HGB FRACT BLD ELPH PH6.0-IMP: NORMAL
IMM GRANULOCYTES # BLD AUTO: 0.04 K/UL (ref 0–0.04)
IMM GRANULOCYTES NFR BLD AUTO: 0.6 % (ref 0–0.5)
LYMPHOCYTES # BLD AUTO: 0.5 K/UL (ref 1–4.8)
LYMPHOCYTES NFR BLD: 7 % (ref 18–48)
MCH RBC QN AUTO: 29.1 PG (ref 27–31)
MCHC RBC AUTO-ENTMCNC: 35.4 G/DL (ref 32–36)
MCV RBC AUTO: 82 FL (ref 82–98)
MONOCYTES # BLD AUTO: 0.1 K/UL (ref 0.3–1)
MONOCYTES NFR BLD: 2 % (ref 4–15)
NEUTROPHILS # BLD AUTO: 6.3 K/UL (ref 1.8–7.7)
NEUTROPHILS NFR BLD: 90 % (ref 38–73)
NRBC BLD-RTO: 0 /100 WBC
PLATELET # BLD AUTO: 114 K/UL (ref 150–450)
PMV BLD AUTO: 10.4 FL (ref 9.2–12.9)
POCT GLUCOSE: 180 MG/DL (ref 70–110)
POCT GLUCOSE: 223 MG/DL (ref 70–110)
POCT GLUCOSE: 253 MG/DL (ref 70–110)
POCT GLUCOSE: 277 MG/DL (ref 70–110)
POTASSIUM SERPL-SCNC: 4.2 MMOL/L (ref 3.5–5.1)
PROT SERPL-MCNC: 5.5 G/DL (ref 6–8.4)
RBC # BLD AUTO: 3.3 M/UL (ref 4–5.4)
SARS-COV-2 AG RESP QL IA.RAPID: NEGATIVE
SODIUM SERPL-SCNC: 135 MMOL/L (ref 136–145)
WBC # BLD AUTO: 7.03 K/UL (ref 3.9–12.7)

## 2022-07-12 PROCEDURE — 94761 N-INVAS EAR/PLS OXIMETRY MLT: CPT

## 2022-07-12 PROCEDURE — 36000706: Performed by: STUDENT IN AN ORGANIZED HEALTH CARE EDUCATION/TRAINING PROGRAM

## 2022-07-12 PROCEDURE — D9220A PRA ANESTHESIA: ICD-10-PCS | Mod: ANES,,, | Performed by: ANESTHESIOLOGY

## 2022-07-12 PROCEDURE — 12000002 HC ACUTE/MED SURGE SEMI-PRIVATE ROOM

## 2022-07-12 PROCEDURE — D9220A PRA ANESTHESIA: ICD-10-PCS | Mod: CRNA,,, | Performed by: NURSE ANESTHETIST, CERTIFIED REGISTERED

## 2022-07-12 PROCEDURE — 99233 PR SUBSEQUENT HOSPITAL CARE,LEVL III: ICD-10-PCS | Mod: ,,, | Performed by: INTERNAL MEDICINE

## 2022-07-12 PROCEDURE — 80053 COMPREHEN METABOLIC PANEL: CPT | Performed by: HOSPITALIST

## 2022-07-12 PROCEDURE — 99900103 DSU ONLY-NO CHARGE-INITIAL HR (STAT): Performed by: STUDENT IN AN ORGANIZED HEALTH CARE EDUCATION/TRAINING PROGRAM

## 2022-07-12 PROCEDURE — 63600175 PHARM REV CODE 636 W HCPCS: Performed by: STUDENT IN AN ORGANIZED HEALTH CARE EDUCATION/TRAINING PROGRAM

## 2022-07-12 PROCEDURE — 25000003 PHARM REV CODE 250: Performed by: NURSE ANESTHETIST, CERTIFIED REGISTERED

## 2022-07-12 PROCEDURE — 25000003 PHARM REV CODE 250: Performed by: HOSPITALIST

## 2022-07-12 PROCEDURE — 63600175 PHARM REV CODE 636 W HCPCS: Performed by: INTERNAL MEDICINE

## 2022-07-12 PROCEDURE — 77001 FLUOROGUIDE FOR VEIN DEVICE: CPT | Mod: 26,,, | Performed by: STUDENT IN AN ORGANIZED HEALTH CARE EDUCATION/TRAINING PROGRAM

## 2022-07-12 PROCEDURE — 71000033 HC RECOVERY, INTIAL HOUR: Performed by: STUDENT IN AN ORGANIZED HEALTH CARE EDUCATION/TRAINING PROGRAM

## 2022-07-12 PROCEDURE — 85025 COMPLETE CBC W/AUTO DIFF WBC: CPT | Performed by: HOSPITALIST

## 2022-07-12 PROCEDURE — D9220A PRA ANESTHESIA: Mod: ANES,,, | Performed by: ANESTHESIOLOGY

## 2022-07-12 PROCEDURE — 71000039 HC RECOVERY, EACH ADD'L HOUR: Performed by: STUDENT IN AN ORGANIZED HEALTH CARE EDUCATION/TRAINING PROGRAM

## 2022-07-12 PROCEDURE — 37000008 HC ANESTHESIA 1ST 15 MINUTES: Performed by: STUDENT IN AN ORGANIZED HEALTH CARE EDUCATION/TRAINING PROGRAM

## 2022-07-12 PROCEDURE — D9220A PRA ANESTHESIA: Mod: CRNA,,, | Performed by: NURSE ANESTHETIST, CERTIFIED REGISTERED

## 2022-07-12 PROCEDURE — 99233 SBSQ HOSP IP/OBS HIGH 50: CPT | Mod: ,,, | Performed by: INTERNAL MEDICINE

## 2022-07-12 PROCEDURE — C1750 CATH, HEMODIALYSIS,LONG-TERM: HCPCS | Performed by: STUDENT IN AN ORGANIZED HEALTH CARE EDUCATION/TRAINING PROGRAM

## 2022-07-12 PROCEDURE — 63600175 PHARM REV CODE 636 W HCPCS: Performed by: NURSE ANESTHETIST, CERTIFIED REGISTERED

## 2022-07-12 PROCEDURE — 36415 COLL VENOUS BLD VENIPUNCTURE: CPT | Performed by: HOSPITALIST

## 2022-07-12 PROCEDURE — 25000003 PHARM REV CODE 250: Performed by: INTERNAL MEDICINE

## 2022-07-12 PROCEDURE — 77001 CHG FLUOROGUIDE CNTRL VEN ACCESS,PLACE,REPLACE,REMOVE: ICD-10-PCS | Mod: 26,,, | Performed by: STUDENT IN AN ORGANIZED HEALTH CARE EDUCATION/TRAINING PROGRAM

## 2022-07-12 PROCEDURE — 36558 INSERT TUNNELED CV CATH: CPT | Mod: LT,,, | Performed by: STUDENT IN AN ORGANIZED HEALTH CARE EDUCATION/TRAINING PROGRAM

## 2022-07-12 PROCEDURE — 37000009 HC ANESTHESIA EA ADD 15 MINS: Performed by: STUDENT IN AN ORGANIZED HEALTH CARE EDUCATION/TRAINING PROGRAM

## 2022-07-12 PROCEDURE — 99900104 DSU ONLY-NO CHARGE-EA ADD'L HR (STAT): Performed by: STUDENT IN AN ORGANIZED HEALTH CARE EDUCATION/TRAINING PROGRAM

## 2022-07-12 PROCEDURE — 36000707: Performed by: STUDENT IN AN ORGANIZED HEALTH CARE EDUCATION/TRAINING PROGRAM

## 2022-07-12 PROCEDURE — 25000003 PHARM REV CODE 250: Performed by: STUDENT IN AN ORGANIZED HEALTH CARE EDUCATION/TRAINING PROGRAM

## 2022-07-12 PROCEDURE — 36558 PR INSERT TUNNELED CV CATH W/O PORT OR PUMP: ICD-10-PCS | Mod: LT,,, | Performed by: STUDENT IN AN ORGANIZED HEALTH CARE EDUCATION/TRAINING PROGRAM

## 2022-07-12 DEVICE — KIT CATH HEMOSPLIT 14FR 23CM: Type: IMPLANTABLE DEVICE | Site: NECK | Status: FUNCTIONAL

## 2022-07-12 RX ORDER — PROPOFOL 10 MG/ML
VIAL (ML) INTRAVENOUS
Status: DISCONTINUED | OUTPATIENT
Start: 2022-07-12 | End: 2022-07-12

## 2022-07-12 RX ORDER — SODIUM CHLORIDE 9 MG/ML
INJECTION, SOLUTION INTRAVENOUS CONTINUOUS
Status: DISCONTINUED | OUTPATIENT
Start: 2022-07-12 | End: 2022-07-17 | Stop reason: HOSPADM

## 2022-07-12 RX ORDER — OXYCODONE HYDROCHLORIDE 5 MG/1
5 TABLET ORAL
Status: DISCONTINUED | OUTPATIENT
Start: 2022-07-12 | End: 2022-07-12 | Stop reason: HOSPADM

## 2022-07-12 RX ORDER — FENTANYL CITRATE 50 UG/ML
25 INJECTION, SOLUTION INTRAMUSCULAR; INTRAVENOUS EVERY 5 MIN PRN
Status: DISCONTINUED | OUTPATIENT
Start: 2022-07-12 | End: 2022-07-12 | Stop reason: HOSPADM

## 2022-07-12 RX ORDER — MEPERIDINE HYDROCHLORIDE 50 MG/ML
12.5 INJECTION INTRAMUSCULAR; INTRAVENOUS; SUBCUTANEOUS ONCE AS NEEDED
Status: DISCONTINUED | OUTPATIENT
Start: 2022-07-12 | End: 2022-07-12 | Stop reason: HOSPADM

## 2022-07-12 RX ORDER — ONDANSETRON 2 MG/ML
INJECTION INTRAMUSCULAR; INTRAVENOUS
Status: DISCONTINUED | OUTPATIENT
Start: 2022-07-12 | End: 2022-07-12

## 2022-07-12 RX ORDER — SODIUM CHLORIDE 0.9 % (FLUSH) 0.9 %
3 SYRINGE (ML) INJECTION EVERY 8 HOURS
Status: DISCONTINUED | OUTPATIENT
Start: 2022-07-12 | End: 2022-07-12 | Stop reason: HOSPADM

## 2022-07-12 RX ORDER — BUPIVACAINE HYDROCHLORIDE AND EPINEPHRINE 5; 5 MG/ML; UG/ML
INJECTION, SOLUTION EPIDURAL; INTRACAUDAL; PERINEURAL
Status: DISCONTINUED | OUTPATIENT
Start: 2022-07-12 | End: 2022-07-12 | Stop reason: HOSPADM

## 2022-07-12 RX ORDER — HYDROMORPHONE HYDROCHLORIDE 2 MG/ML
0.2 INJECTION, SOLUTION INTRAMUSCULAR; INTRAVENOUS; SUBCUTANEOUS EVERY 5 MIN PRN
Status: DISCONTINUED | OUTPATIENT
Start: 2022-07-12 | End: 2022-07-12 | Stop reason: HOSPADM

## 2022-07-12 RX ORDER — ONDANSETRON 2 MG/ML
4 INJECTION INTRAMUSCULAR; INTRAVENOUS DAILY PRN
Status: DISCONTINUED | OUTPATIENT
Start: 2022-07-12 | End: 2022-07-12 | Stop reason: HOSPADM

## 2022-07-12 RX ORDER — HEPARIN SODIUM,PORCINE/PF 10 UNIT/ML
SYRINGE (ML) INTRAVENOUS
Status: DISCONTINUED | OUTPATIENT
Start: 2022-07-12 | End: 2022-07-12 | Stop reason: HOSPADM

## 2022-07-12 RX ORDER — MIDAZOLAM HYDROCHLORIDE 1 MG/ML
INJECTION, SOLUTION INTRAMUSCULAR; INTRAVENOUS
Status: DISCONTINUED | OUTPATIENT
Start: 2022-07-12 | End: 2022-07-12

## 2022-07-12 RX ORDER — CLINDAMYCIN PHOSPHATE 900 MG/50ML
900 INJECTION, SOLUTION INTRAVENOUS
Status: DISCONTINUED | OUTPATIENT
Start: 2022-07-12 | End: 2022-07-12 | Stop reason: HOSPADM

## 2022-07-12 RX ORDER — SODIUM CHLORIDE 0.9 % (FLUSH) 0.9 %
3 SYRINGE (ML) INJECTION
Status: DISCONTINUED | OUTPATIENT
Start: 2022-07-12 | End: 2022-07-12 | Stop reason: HOSPADM

## 2022-07-12 RX ORDER — LIDOCAINE HYDROCHLORIDE 20 MG/ML
INJECTION INTRAVENOUS
Status: DISCONTINUED | OUTPATIENT
Start: 2022-07-12 | End: 2022-07-12

## 2022-07-12 RX ORDER — FENTANYL CITRATE 50 UG/ML
INJECTION, SOLUTION INTRAMUSCULAR; INTRAVENOUS
Status: DISCONTINUED | OUTPATIENT
Start: 2022-07-12 | End: 2022-07-12

## 2022-07-12 RX ORDER — DIPHENHYDRAMINE HYDROCHLORIDE 50 MG/ML
25 INJECTION INTRAMUSCULAR; INTRAVENOUS EVERY 6 HOURS PRN
Status: DISCONTINUED | OUTPATIENT
Start: 2022-07-12 | End: 2022-07-12 | Stop reason: HOSPADM

## 2022-07-12 RX ADMIN — SODIUM CHLORIDE: 0.9 INJECTION, SOLUTION INTRAVENOUS at 12:07

## 2022-07-12 RX ADMIN — ISOSORBIDE MONONITRATE 60 MG: 60 TABLET, EXTENDED RELEASE ORAL at 09:07

## 2022-07-12 RX ADMIN — FAMOTIDINE 20 MG: 20 TABLET ORAL at 09:07

## 2022-07-12 RX ADMIN — MUPIROCIN: 20 OINTMENT TOPICAL at 09:07

## 2022-07-12 RX ADMIN — INSULIN ASPART 4 UNITS: 100 INJECTION, SOLUTION INTRAVENOUS; SUBCUTANEOUS at 09:07

## 2022-07-12 RX ADMIN — FENTANYL CITRATE 50 MCG: 50 INJECTION, SOLUTION INTRAMUSCULAR; INTRAVENOUS at 02:07

## 2022-07-12 RX ADMIN — LEVETIRACETAM 500 MG: 500 TABLET, FILM COATED ORAL at 09:07

## 2022-07-12 RX ADMIN — METHYLPREDNISOLONE SODIUM SUCCINATE 125 MG: 125 INJECTION, POWDER, FOR SOLUTION INTRAMUSCULAR; INTRAVENOUS at 04:07

## 2022-07-12 RX ADMIN — AMLODIPINE BESYLATE 10 MG: 5 TABLET ORAL at 09:07

## 2022-07-12 RX ADMIN — INSULIN ASPART 6 UNITS: 100 INJECTION, SOLUTION INTRAVENOUS; SUBCUTANEOUS at 04:07

## 2022-07-12 RX ADMIN — PROPOFOL 50 MG: 10 INJECTION, EMULSION INTRAVENOUS at 02:07

## 2022-07-12 RX ADMIN — ONDANSETRON 4 MG: 2 INJECTION INTRAMUSCULAR; INTRAVENOUS at 02:07

## 2022-07-12 RX ADMIN — HYDROCODONE BITARTRATE AND ACETAMINOPHEN 1 TABLET: 5; 325 TABLET ORAL at 10:07

## 2022-07-12 RX ADMIN — PROPOFOL 30 MG: 10 INJECTION, EMULSION INTRAVENOUS at 02:07

## 2022-07-12 RX ADMIN — LIDOCAINE HYDROCHLORIDE 50 MG: 20 INJECTION, SOLUTION INTRAVENOUS at 02:07

## 2022-07-12 RX ADMIN — MIDAZOLAM 2 MG: 1 INJECTION INTRAMUSCULAR; INTRAVENOUS at 02:07

## 2022-07-12 RX ADMIN — INSULIN ASPART 3 UNITS: 100 INJECTION, SOLUTION INTRAVENOUS; SUBCUTANEOUS at 08:07

## 2022-07-12 RX ADMIN — METHYLPREDNISOLONE SODIUM SUCCINATE 125 MG: 125 INJECTION, POWDER, FOR SOLUTION INTRAMUSCULAR; INTRAVENOUS at 09:07

## 2022-07-12 NOTE — PROGRESS NOTES
"[facility]  Hematology/Oncology  Progress Note    Patient Name: Tabby Howard  Admission Date: 7/7/2022  Hospital Length of Stay: 4 days  Code Status: Prior     Subjective:     Overall doing better, pt resting quietly   Interval History:   32 y.o. female with a PMHx of DM II, supraventricular tachycardia, heart failure with preserved ejection fraction (04/12/22), Sickle cell trait, and CKD (stage IV) sent to the ED because of abnormal labs.   c/o shortness of breathness, feeling lethargic and generalized weakness for few weeks but has been feeling worse.   c/o diffuse back pain over the past month, intermittent chills as well as diarrhea a few days ago that has since improved.  Patient specifies she is able to urinate but notes that the volume has decreased.  Also states that her  leg swelling has been getting worse due to her being "full of fluid."  LMP was in December 2021.  per med rec.  Patient reports she had irregular menstrual cycles up until December.  She has been receiving intermittent blood transfusions  hospital since December     Oncology Consult:  We have been consulted for severe anemia.  On arrival she was noted to have a hemoglobin of 4.9.  She is now status post 2 units packed red blood cell.  Patient reports she seen a hematologist years ago and was told she had sickle cell trait.      Medications:  Continuous Infusions:   nicardipine Stopped (07/11/22 0243)     Scheduled Meds:   amLODIPine  10 mg Oral Daily    epoetin teresa-epbx  10,000 Units Intravenous Every Mon, Wed, Fri    famotidine  20 mg Oral Daily    heparin (porcine)  4,000 Units Intra-Catheter Once    isosorbide mononitrate  60 mg Oral Daily    levETIRAcetam  500 mg Oral BID    mupirocin   Nasal BID     PRN Meds:sodium chloride, sodium chloride, acetaminophen-codeine 300-30mg, dextrose 10%, dextrose 10%, dextrose 10%, glucagon (human recombinant), glucose, glucose, heparin (porcine), " HYDROcodone-acetaminophen, insulin aspart U-100, labetaloL, melatonin, sodium chloride 0.9%     Review of Systems   Constitutional: Negative for fever.   HENT: Negative.    Eyes: Negative.    Respiratory: Positive for shortness of breath.    Cardiovascular: Negative.    Gastrointestinal: Negative.    Endocrine: Negative.    Genitourinary: Negative.    Musculoskeletal: Positive for back pain.   Skin: Negative.    Allergic/Immunologic: Negative.    Neurological: Negative.    Hematological: Negative.    Psychiatric/Behavioral: Negative  Objective:     Vital Signs (Most Recent):  Temp: 97.7 °F (36.5 °C) (07/11/22 1952)  Pulse: 107 (07/11/22 2000)  Resp: 18 (07/11/22 1800)  BP: (!) 176/105 (07/11/22 2000)  SpO2: 97 % (07/11/22 1952) Vital Signs (24h Range):  Temp:  [97.5 °F (36.4 °C)-99 °F (37.2 °C)] 97.7 °F (36.5 °C)  Pulse:  [] 107  Resp:  [12-41] 18  SpO2:  [82 %-100 %] 97 %  BP: (130-179)/() 176/105     Weight: 76.2 kg (167 lb 15.9 oz)  Body mass index is 30.73 kg/m².  Body surface area is 1.83 meters squared.      Intake/Output Summary (Last 24 hours) at 7/11/2022 2025  Last data filed at 7/11/2022 1645  Gross per 24 hour   Intake 657.16 ml   Output 300 ml   Net 357.16 ml       Physical Exam  GENERAL: Comfortable looking patient.  Tearful  Awake, alert and oriented to time, person and place.  No anxiety, or agitation.       HEENT: Normal conjunctivae and eyelids. WNL.  PERRLA 3 to 4 mm. No icterus, no pallor, no congestion, and no discharge noted.      NECK:  Supple. Trachea is central.  No crepitus.  No JVD or masses.     RESPIRATORY:  No intercostal retractions.  No dullness to percussion.  Chest is clear to auscultation.  No rales, rhonchi or wheezes.  No crepitus.  Good air entry bilaterally.     CARDIOVASCULAR:  S1 and S2 are normally heard without murmurs or gallops.  All peripheral pulses are present.     ABDOMEN:  Normal abdomen.  No hepatosplenomegaly.  No free fluid.  Bowel sounds are present.   No hernia noted. No masses.  No rebound or tenderness.  No guarding or rigidity.  Umbilicus is midline.     LYMPHATICS:  No axillary, cervical, supraclavicular, submental, or inguinal lymphadenopathy.     SKIN/MUSCULOSKELETAL:  There is no evidence of excoriation marks or ecchmosis.  No rashes.  No cyanosis.  No clubbing.  No joint or skeletal deformities noted.  Normal range of motion. + edema noted in bilateral lower extremities     NEUROLOGIC:  Higher functions are appropriate.  No cranial nerve deficits.  Normal jatin.  Normal strength.  Motor and sensory functions are normal.  Deep tendon reflexes are normal.     GENITAL/RECTAL:  Exams are deferred  Significant Labs:   Lab Results   Component Value Date    WBC 8.48 07/11/2022    HGB 6.6 (L) 07/11/2022    HCT 19.1 (LL) 07/11/2022    MCV 84 07/11/2022    PLT 83 (L) 07/11/2022     CMP  Sodium   Date Value Ref Range Status   07/11/2022 138 136 - 145 mmol/L Final     Potassium   Date Value Ref Range Status   07/11/2022 4.6 3.5 - 5.1 mmol/L Final     Chloride   Date Value Ref Range Status   07/11/2022 107 95 - 110 mmol/L Final     CO2   Date Value Ref Range Status   07/11/2022 24 23 - 29 mmol/L Final     Glucose   Date Value Ref Range Status   07/11/2022 134 (H) 70 - 110 mg/dL Final     BUN   Date Value Ref Range Status   07/11/2022 27 (H) 6 - 20 mg/dL Final     Creatinine   Date Value Ref Range Status   07/11/2022 3.5 (H) 0.5 - 1.4 mg/dL Final     Calcium   Date Value Ref Range Status   07/11/2022 7.8 (L) 8.7 - 10.5 mg/dL Final     Total Protein   Date Value Ref Range Status   07/11/2022 4.4 (L) 6.0 - 8.4 g/dL Final     Albumin   Date Value Ref Range Status   07/11/2022 1.4 (L) 3.5 - 5.2 g/dL Final     Total Bilirubin   Date Value Ref Range Status   07/11/2022 0.8 0.1 - 1.0 mg/dL Final     Comment:     For infants and newborns, interpretation of results should be based  on gestational age, weight and in agreement with clinical  observations.    Premature Infant  recommended reference ranges:  Up to 24 hours.............<8.0 mg/dL  Up to 48 hours............<12.0 mg/dL  3-5 days..................<15.0 mg/dL  6-29 days.................<15.0 mg/dL       Alkaline Phosphatase   Date Value Ref Range Status   07/11/2022 83 55 - 135 U/L Final     AST   Date Value Ref Range Status   07/11/2022 13 10 - 40 U/L Final     ALT   Date Value Ref Range Status   07/11/2022 11 10 - 44 U/L Final     Anion Gap   Date Value Ref Range Status   07/11/2022 7 (L) 8 - 16 mmol/L Final     eGFR if    Date Value Ref Range Status   07/11/2022 19 (A) >60 mL/min/1.73 m^2 Final     eGFR if non    Date Value Ref Range Status   07/11/2022 16 (A) >60 mL/min/1.73 m^2 Final     Comment:     Calculation used to obtain the estimated glomerular filtration  rate (eGFR) is the CKD-EPI equation.            Assessment/Plan:     Active Diagnoses:    Diagnosis Date Noted POA    PRINCIPAL PROBLEM:  Acute renal failure superimposed on chronic kidney disease [N17.9, N18.9] 07/07/2022 Yes    Hypertensive emergency [I16.1] 07/08/2022 Yes    Anemia [D64.9] 07/07/2022 Yes    Seizure [R56.9] 05/29/2022 Yes    CKD (chronic kidney disease), stage IV [N18.4] 04/12/2022 Yes    Sickle cell trait [D57.3] 04/12/2022 Yes    Pericardial effusion [I31.3] 03/07/2022 Yes    Anemia of chronic kidney failure [N18.9, D63.1] 04/24/2021 Yes    Type II diabetes mellitus [E11.9] 10/16/2019 Yes      Problems Resolved During this Admission:    Diagnosis Date Noted Date Resolved POA    Hyperkalemia [E87.5] 07/07/2022 07/08/2022 Yes     Anemia:  Concerning for hemolytic anemia post transfusion hemoglobin  increased to 8.5  Now dwindling again 6.6 would recommend starting solumedrol 125 mg tid   dialysed yesterday.  MCV is normal.  Ferritin is over 3000. Haptoglobin less than 10 patient's total protein is also decreased .  G6PD vitamin B12 hemoglobin electrophoresis I pending  Jemal test is negative.  Total  bilirubin is slightly elevated it was 2.1.  LDH is elevated at 446 and retic count is elevated be suggest hemolysis however patient has known sickle cell trait/anemia  Will continue to follow transfuse as needed wait for rest of workup to return  -we will follow-up all results  -transfuse 1 unit packed red blood cell for hemoglobin less than 7     Acute renal failure on chronic kidney disease:  Creatinine is improving dialyzed yesterday  Managed by Nephrology        Uncontrolled hypertension:  Managed by hospitalist currently on Cardene drip  Thank you for your consult.  Janessa Bland MD  Hematology/Oncology  [facility]

## 2022-07-12 NOTE — ANESTHESIA POSTPROCEDURE EVALUATION
Anesthesia Post Evaluation    Patient: Tabby Howard    Procedure(s) Performed: Procedure(s) (LRB):  INSERTION-CATHETER-JOSEPH (Left)    Final Anesthesia Type: general      Patient location during evaluation: PACU  Patient participation: Yes- Able to Participate  Level of consciousness: awake and alert and oriented  Post-procedure vital signs: reviewed and stable  Pain management: adequate  Airway patency: patent  JULIO CESAR mitigation strategies: Multimodal analgesia  PONV status at discharge: No PONV  Anesthetic complications: no      Cardiovascular status: blood pressure returned to baseline  Respiratory status: unassisted, spontaneous ventilation and room air  Hydration status: euvolemic  Follow-up not needed.          Vitals Value Taken Time   /73 07/12/22 1611   Temp 36.5 °C (97.7 °F) 07/12/22 1610   Pulse 90 07/12/22 1611   Resp 8 07/12/22 1611   SpO2 93 % 07/12/22 1611   Vitals shown include unvalidated device data.      Event Time   Out of Recovery 07/12/2022 16:16:00         Pain/Lien Score: Pain Rating Prior to Med Admin: 10 (7/12/2022 10:47 AM)  Pain Rating Post Med Admin: 2 (7/12/2022 11:47 AM)  Lien Score: 10 (7/12/2022  4:15 PM)

## 2022-07-12 NOTE — ASSESSMENT & PLAN NOTE
Off  Cardene drip  Started on amlodipine and isosorbide mononitrate  7/11 increased amlodipine dose

## 2022-07-12 NOTE — PLAN OF CARE
Criteria met for transfer per anesthesia  Awake alert and oriented  No acute resp distress  Vs wnl and stable  Denies pain  IV patent  eugene cath to left subclavian secure and intact- no bleeding or hematoma  Tolerating po with no PONV  Bedside report given to KINGSLEY Joaquin

## 2022-07-12 NOTE — PROGRESS NOTES
"[facility]  Hematology/Oncology  Progress Note    Patient Name: Tabby Howard  Admission Date: 7/7/2022  Hospital Length of Stay: 5 days  Code Status: Prior     Subjective:     Interval History: went for hemisplit placement  32 y.o. female with a PMHx of DM II, supraventricular tachycardia, heart failure with preserved ejection fraction (04/12/22), Sickle cell trait, and CKD (stage IV) sent to the ED because of abnormal labs.   c/o shortness of breathness, feeling lethargic and generalized weakness for few weeks but has been feeling worse.   c/o diffuse back pain over the past month, intermittent chills as well as diarrhea a few days ago that has since improved.  Patient specifies she is able to urinate but notes that the volume has decreased.  Also states that her  leg swelling has been getting worse due to her being "full of fluid."  LMP was in December 2021.  per med rec.  Patient reports she had irregular menstrual cycles up until December.  She has been receiving intermittent blood transfusions  hospital since December     Oncology Consult:  We have been consulted for severe anemia.  On arrival she was noted to have a hemoglobin of 4.9.  She is now status post 2 units packed red blood cell.  Patient reports she seen a hematologist years ago and was told she had sickle cell trait.    Medications:  Continuous Infusions:  Scheduled Meds:   amLODIPine  10 mg Oral Daily    epoetin teresa-epbx  10,000 Units Intravenous Every Mon, Wed, Fri    famotidine  20 mg Oral Daily    heparin (porcine)  4,000 Units Intra-Catheter Once    isosorbide mononitrate  60 mg Oral Daily    levETIRAcetam  500 mg Oral BID    methylPREDNISolone sodium succinate injection  125 mg Intravenous TID    mupirocin   Nasal BID     PRN Meds:sodium chloride, sodium chloride, acetaminophen-codeine 300-30mg, dextrose 10%, dextrose 10%, dextrose 10%, glucagon (human recombinant), glucose, glucose, heparin (porcine), " HYDROcodone-acetaminophen, insulin aspart U-100, labetaloL, melatonin, sodium chloride 0.9%     Review of Systems     Constitutional: Negative for fever.   HENT: Negative.    Eyes: Negative.    Respiratory: Positive for shortness of breath.    Cardiovascular: Negative.    Gastrointestinal: Negative.    Endocrine: Negative.    Genitourinary: Negative.    Musculoskeletal: Positive for back pain.   Skin: Negative.    Allergic/Immunologic: Negative.    Neurological: Negative.    Hematological: Negative.    Psychiatric/Behavioral: Negative  Objective:     Vital Signs (Most Recent):  Temp: 97.8 °F (36.6 °C) (07/12/22 1144)  Pulse: 102 (07/12/22 1144)  Resp: 17 (07/12/22 1144)  BP: (!) 151/98 (07/12/22 1144)  SpO2: (!) 94 % (07/12/22 1144) Vital Signs (24h Range):  Temp:  [97.5 °F (36.4 °C)-99 °F (37.2 °C)] 97.8 °F (36.6 °C)  Pulse:  [] 102  Resp:  [13-41] 17  SpO2:  [82 %-97 %] 94 %  BP: (144-176)/() 151/98     Weight: 76.2 kg (167 lb 15.9 oz)  Body mass index is 30.73 kg/m².  Body surface area is 1.83 meters squared.      Intake/Output Summary (Last 24 hours) at 7/12/2022 1205  Last data filed at 7/12/2022 0400  Gross per 24 hour   Intake 900 ml   Output 4220 ml   Net -3320 ml       Physical Exam    GENERAL: Comfortable looking patient.  Tearful  Awake, alert and oriented to time, person and place.  No anxiety, or agitation.       HEENT: Normal conjunctivae and eyelids. WNL.  PERRLA 3 to 4 mm. No icterus, no pallor, no congestion, and no discharge noted.      NECK:  Supple. Trachea is central.  No crepitus.  No JVD or masses.     RESPIRATORY:  No intercostal retractions.  No dullness to percussion.  Chest is clear to auscultation.  No rales, rhonchi or wheezes.  No crepitus.  Good air entry bilaterally.     CARDIOVASCULAR:  S1 and S2 are normally heard without murmurs or gallops.  All peripheral pulses are present.     ABDOMEN:  Normal abdomen.  No hepatosplenomegaly.  No free fluid.  Bowel sounds are  present.  No hernia noted. No masses.  No rebound or tenderness.  No guarding or rigidity.  Umbilicus is midline.     LYMPHATICS:  No axillary, cervical, supraclavicular, submental, or inguinal lymphadenopathy.     SKIN/MUSCULOSKELETAL:  There is no evidence of excoriation marks or ecchmosis.  No rashes.  No cyanosis.  No clubbing.  No joint or skeletal deformities noted.  Normal range of motion. + edema noted in bilateral lower extremities     NEUROLOGIC:  Higher functions are appropriate.  No cranial nerve deficits.  Normal jatin.  Normal strength.  Motor and sensory functions are normal.  Deep tendon reflexes are normal.     GENITAL/RECTAL:  Exams are deferred    Significant Labs:   Lab Results   Component Value Date    WBC 7.03 07/12/2022    HGB 9.6 (L) 07/12/2022    HCT 27.1 (L) 07/12/2022    MCV 82 07/12/2022     (L) 07/12/2022     CMP  Sodium   Date Value Ref Range Status   07/12/2022 135 (L) 136 - 145 mmol/L Final     Potassium   Date Value Ref Range Status   07/12/2022 4.2 3.5 - 5.1 mmol/L Final     Chloride   Date Value Ref Range Status   07/12/2022 105 95 - 110 mmol/L Final     CO2   Date Value Ref Range Status   07/12/2022 20 (L) 23 - 29 mmol/L Final     Glucose   Date Value Ref Range Status   07/12/2022 248 (H) 70 - 110 mg/dL Final     BUN   Date Value Ref Range Status   07/12/2022 23 (H) 6 - 20 mg/dL Final     Creatinine   Date Value Ref Range Status   07/12/2022 2.8 (H) 0.5 - 1.4 mg/dL Final     Calcium   Date Value Ref Range Status   07/12/2022 8.2 (L) 8.7 - 10.5 mg/dL Final     Total Protein   Date Value Ref Range Status   07/12/2022 5.5 (L) 6.0 - 8.4 g/dL Final     Albumin   Date Value Ref Range Status   07/12/2022 1.7 (L) 3.5 - 5.2 g/dL Final     Total Bilirubin   Date Value Ref Range Status   07/12/2022 1.0 0.1 - 1.0 mg/dL Final     Comment:     For infants and newborns, interpretation of results should be based  on gestational age, weight and in agreement with  clinical  observations.    Premature Infant recommended reference ranges:  Up to 24 hours.............<8.0 mg/dL  Up to 48 hours............<12.0 mg/dL  3-5 days..................<15.0 mg/dL  6-29 days.................<15.0 mg/dL       Alkaline Phosphatase   Date Value Ref Range Status   07/12/2022 108 55 - 135 U/L Final     AST   Date Value Ref Range Status   07/12/2022 16 10 - 40 U/L Final     ALT   Date Value Ref Range Status   07/12/2022 13 10 - 44 U/L Final     Anion Gap   Date Value Ref Range Status   07/12/2022 10 8 - 16 mmol/L Final     eGFR if    Date Value Ref Range Status   07/12/2022 25 (A) >60 mL/min/1.73 m^2 Final     eGFR if non    Date Value Ref Range Status   07/12/2022 21 (A) >60 mL/min/1.73 m^2 Final     Comment:     Calculation used to obtain the estimated glomerular filtration  rate (eGFR) is the CKD-EPI equation.            Assessment/Plan:     Active Diagnoses:    Diagnosis Date Noted POA    PRINCIPAL PROBLEM:  Acute renal failure superimposed on chronic kidney disease [N17.9, N18.9] 07/07/2022 Yes    Hypertensive emergency [I16.1] 07/08/2022 Yes    Anemia [D64.9] 07/07/2022 Yes    Seizure [R56.9] 05/29/2022 Yes    CKD (chronic kidney disease), stage IV [N18.4] 04/12/2022 Yes    Sickle cell trait [D57.3] 04/12/2022 Yes    Pericardial effusion [I31.3] 03/07/2022 Yes    Anemia of chronic kidney failure [N18.9, D63.1] 04/24/2021 Yes    Type II diabetes mellitus [E11.9] 10/16/2019 Yes      Problems Resolved During this Admission:    Diagnosis Date Noted Date Resolved POA    Hyperkalemia [E87.5] 07/07/2022 07/08/2022 Yes   Anemia:  Concerning for hemolytic anemia post transfusion hemoglobin  increased to 8.5  then dwindling again 6.6 started  solumedrol 125 mg tid hemoglobin much better this morning patient transfused for hemoglobin of 6.6. cont steroids monitor daily cbc   dialysed yesterday.  MCV is normal.  Ferritin is over 3000. Haptoglobin less than 10  patient's total protein is also decreased .  G6PD vitamin B12 hemoglobin electrophoresis I pending  Jemal test is negative.  Total bilirubin is slightly elevated it was 2.1.  LDH is elevated at 446 and retic count is elevated be suggest hemolysis however patient has known sickle cell trait/anemia  Will continue to follow transfuse as needed wait for rest of workup to return  -we will follow-up all results  -transfuse 1 unit packed red blood cell for hemoglobin less than 7     Acute renal failure on chronic kidney disease:  Creatinine is improving dialyzed yesterday. meds adjusted for renal dosing Managed by Nephrology        Uncontrolled hypertension: better now, on medical floor transferred from ICU          Janessa Bland MD  Hematology/Oncology  [facility]

## 2022-07-12 NOTE — OP NOTE
[facility]  Surgery Department  Operative Note    SUMMARY     Date of Procedure: 7/12/2022     Procedure: Procedure(s) (LRB):  INSERTION-CATHETER-JOSEPH (Left)     Surgeon(s) and Role:     * Dionte Gan MD - Primary    Assisting Surgeon: None    Pre-Operative Diagnosis: Acute renal failure superimposed on chronic kidney disease [N17.9, N18.9]    Post-Operative Diagnosis: Post-Op Diagnosis Codes:     * Acute renal failure superimposed on chronic kidney disease [N17.9, N18.9]    Anesthesia:  Local/mac    Operative Findings (including complications, if any):  Left internal jugular HemoSplit tunneled to the left chest wall    Description of Technical Procedures:  This is a 32-year-old female who has progressed to end-stage renal disease.  She did consent to HemoSplit line placement for outpatient hemodialysis.  She was taken to the OR, placed supine, sedated by Anesthesia, the neck and chest were prepped and draped in usual fashion, a time-out was completed.  Using ultrasound, the left internal jugular vein was cannulated with a hollow bore needle.  A wire was advanced and confirmed to be in appropriate location using ultrasound and fluoroscopy.  A location on the left chest wall was chosen for the catheter exit site.  A skin nick was made at this location.  The catheter was then tunneled from the chest wall to the wire exit site in the neck.  Using Seldinger technique and under fluoroscopic guidance, the wire was exchanged for a split sheath dilator without apparent complication.  The catheter was advanced down the sheath and the sheath was then removed.  Repeat fluoroscopy confirmed appropriate location of the catheter without kinking.  The catheter was accessed, aspirated, and then flushed with heparinized saline with ease.  It was secured to the chest wall using 2-0 nylon sutures.  The wire exit site in the neck was closed with a single buried 4-0 Vicryl suture.  A dressing was applied.   Patient tolerated the entire procedure without apparent complication, was woken from anesthesia, brought to recovery in stable condition.  All counts were correct.    Significant Surgical Tasks Conducted by the Assistant(s), if Applicable: n/a    Estimated Blood Loss (EBL): min           Implants:   Implant Name Type Inv. Item Serial No.  Lot No. LRB No. Used Action   KIT CATH HEMOSPLIT 14FR 23CM - [id number] Dialysis Catheter KIT CATH HEMOSPLIT 14FR 23CM  [employer] [id number] Left 1 Implanted       Specimens:   Specimen (24h ago, onward)            None                  Condition: Good    Disposition: PACU - hemodynamically stable.    Attestation: I was present and scrubbed for the entire procedure.

## 2022-07-12 NOTE — OR NURSING
Transferred from room 306a via wheelchair by Brenda. Report from Agustin LINTON. IV to left AC leaking, when flushed to verify patency. Allergy band and fall risk applied. Alert and oriented x 4. Pain 3/10 to neck.

## 2022-07-12 NOTE — PROGRESS NOTES
"INPATIENT NEPHROLOGY PROGRESS  [facility]    Patient Name: Tabby Howard  Date: 2022    Reason for consultation: KANWAL    Chief Complaint:   Chief Complaint   Patient presents with    Abnormal Lab     History of Present Illness:  32 female with a PMHx of DM II, supraventricular tachycardia, heart failure with preserved ejection fraction (22), Sickle cell trait, and CKD (stage IV) who states that she came to the ED because of abnormal labs. She has been having shortness of breathness, feeling lethargic and generalized weakness for few weeks but has been feeling worse.  Patient had lab work done this morning and was informed of abnormal "kidney" results with referral to the ED for further evaluation and work up. She endorses diffuse back pain over the past month, intermittent chills as well as diarrhea a few days ago that has since improved. Patient specifies she is able to urinate but notes that the volume has decreased. Also states that her leg swelling has been getting worse due to her being "full of fluid."  LMP was in 2021. The patient denies fever, N/V, abdominal pain on exam, CP or any other symptoms at this time. PSHx includes EGD and . She was seen by our group during her last hospitalization and was scheduled for f/u with Dr. BATRES next week- previsit labs showed severe anemia and metabolic derangements so she was sent into the hospital.      JUAN A neg, UA with 3+ protein, 1+ blood  - UPCR 10.8    Admit CT AP:  Interval development of right pleural effusion.  Stable prominent pericardial effusion.  Interstitial prominence raising question of pulmonary edema.  Stable abdomen and pelvis with ascites, anasarca.    Interval History:  - hypertensive emergency, Hb 4, K 6.4, CO2 15 in the ER- spoke with ER physician and advised trialysis cath placement for emergent HD with blood transfusion- did HD overnight, started NTG gtt for BP control- switched to " nicardipine gtt overnight- got 2u of blood- washington placed- oliguric, BP better, clearance parameters are better  7/9  Hgb improving, 7.8, post transfusion.  K+ at goal. Appreciate heme-onc input.  Pt fatigued, not conversing much.  No complaints.  Dialysis catheter to be replaced today, pt to have HD following.  7/10  Trialysis eval by gen surgery yesterday, line ok to continue to use--then pt pulled catheter out herself (per RN report), so new line was placed.  Had HD last night, 4L net fluid removal.  To have tunneled catheter placed tomorrow.  136cc UOP recorded.  Off cardene gtt, pressures stable thus far--improving w/fluid removal.    7/11 VSS. UO not great. HD today. Getting tunneled HD cath a well.  7/12 VSS, plan for tunneled cath by Dr. Gan. HD in AM. ? Blood transfusion - agree with Heme/Onc to transfuse if Hgb < 7, rather than more traditional goals. Will prefer to do so with HD.    Plan of Care:    KANWAL/CKD V- likely now ESRD- history of uncontrolled DMII  Nephrotic syndrome with ansaraca; History of microscopic hematuria  HTN emergency/D CHF/Acut pulm edema/Pericardial effusion/Pleural effusion  Secondary HPT  Hemolytic anemia/Thrombocytopenia; likely component of Anemia of CKD as well    Plan:    - Initiated RRT on 7/7. Anticipate discharge with dialysis with possible outpatient renal biopsy.Continue washington for now. Tunneled cath scheduled for 7/12.  - Off cardene gtt- aim for BP ~150/90- once we get more fluid off, will start to add oral BP medication and diuretics. Renal diet with a 1.5L fluid restriction.   - Will f/u echo.  - Check phos, PTH--acceptable  - Hgb is improved after 2u of blood- heme/onc input apprecaited. Plan to transfuse for Hgb <7.    Thank you for allowing us to participate in this patient's care. We will continue to follow.    Vital Signs:  Temp Readings from Last 3 Encounters:   07/12/22 98.6 °F (37 °C)   06/11/22 98.2 °F (36.8 °C) (Oral)   06/07/22 98.1 °F (36.7 °C) (Axillary)        Pulse Readings from Last 3 Encounters:   22 105   22 100   22 110       BP Readings from Last 3 Encounters:   22 (!) 156/98   22 (!) 173/105   22 129/81       Weight:  Wt Readings from Last 3 Encounters:   07/10/22 76.2 kg (167 lb 15.9 oz)   22 73.9 kg (163 lb)   22 74.2 kg (163 lb 9.3 oz)       INS/OUTS:  I/O last 3 completed shifts:  In: 1157.2 [P.O.:240; I.V.:17.2; Blood:400; Other:500]  Out: 4510 [Urine:1010; Other:3500]  No intake/output data recorded.    Past Medical & Surgical History:  Past Medical History:   Diagnosis Date    CKD (chronic kidney disease), stage IV 2022    Diabetes mellitus due to underlying condition with unspecified complications 2022    Gastroparesis 2022    Heart failure with preserved ejection fraction 2022    EF 55% on 3/22    History of gastroesophageal reflux (GERD)     History of supraventricular tachycardia     Hyperkalemia 2022    Sickle cell trait 2022    Type 2 diabetes mellitus        Past Surgical History:   Procedure Laterality Date     SECTION      x 3    ESOPHAGOGASTRODUODENOSCOPY N/A 10/18/2019    Procedure: ESOPHAGOGASTRODUODENOSCOPY (EGD);  Surgeon: Gianluca Mendez MD;  Location: [facility];  Service: Endoscopy;  Laterality: N/A;       Past Social History:  Social History     Socioeconomic History    Marital status:    Tobacco Use    Smoking status: Never Smoker    Smokeless tobacco: Never Used   Substance and Sexual Activity    Alcohol use: No    Drug use: No    Sexual activity: Not Currently     Partners: Male     Birth control/protection: I.U.D.       Medications:  Scheduled Meds:   amLODIPine  10 mg Oral Daily    epoetin teresa-epbx  10,000 Units Intravenous Every Mon, Wed, Fri    famotidine  20 mg Oral Daily    heparin (porcine)  4,000 Units Intra-Catheter Once    isosorbide mononitrate  60 mg Oral Daily    levETIRAcetam  500 mg Oral BID     "methylPREDNISolone sodium succinate injection  125 mg Intravenous TID    mupirocin   Nasal BID     Continuous Infusions:    PRN Meds:.sodium chloride, sodium chloride, acetaminophen-codeine 300-30mg, dextrose 10%, dextrose 10%, dextrose 10%, glucagon (human recombinant), glucose, glucose, heparin (porcine), HYDROcodone-acetaminophen, insulin aspart U-100, labetaloL, melatonin, sodium chloride 0.9%  No current facility-administered medications on file prior to encounter.     Current Outpatient Medications on File Prior to Encounter   Medication Sig Dispense Refill    FLUoxetine 20 MG capsule Take 1 capsule (20 mg total) by mouth once daily. 30 capsule 1    isosorbide mononitrate (IMDUR) 60 MG 24 hr tablet Take 1 tablet (60 mg total) by mouth once daily. 30 tablet 1    LANTUS U-100 INSULIN 100 unit/mL injection SMARTSI Unit(s) SUB-Q Every Morning      levETIRAcetam (KEPPRA) 500 MG Tab Take 1 tablet (500 mg total) by mouth 2 (two) times daily. 60 tablet 1    torsemide (DEMADEX) 20 MG Tab Take 1 tablet (20 mg total) by mouth 2 (two) times a day. (Patient taking differently: Take 20 mg by mouth once daily.) 60 tablet 1    [DISCONTINUED] furosemide (LASIX) 40 MG tablet Take 1 tablet (40 mg total) by mouth 2 (two) times daily. 60 tablet 0    [DISCONTINUED] pantoprazole (PROTONIX) 40 MG tablet Take 1 tablet (40 mg total) by mouth once daily. 30 tablet 3       Allergies:  Penicillins    Past Family History:  Reviewed; refer to Hospitalist Admission Note    Review of Systems:  Review of Systems - All 14 systems reviewed and negative, except as noted in HPI    Physical Exam:  BP (!) 156/98   Pulse 105   Temp 98.6 °F (37 °C)   Resp 18   Ht 5' 2" (1.575 m)   Wt 76.2 kg (167 lb 15.9 oz)   LMP 2021 (Approximate)   SpO2 (!) 93%   Breastfeeding No   BMI 30.73 kg/m²     General Appearance:    NAD, AAO x 3, cooperative, appears stated age   Head:    Normocephalic, atraumatic   Eyes:    PER, EOMI, and " conjunctiva/sclera clear bilaterally        Mouth:   Moist mucus membranes   Back:     No CVA tenderness   Lungs:     Rales   Heart:    Regular rate and rhythm, S1 and S2 normal, no murmur, rub   or gallop   Abdomen:     Soft, non-tender, non-distended, bowel sounds active all four   quadrants, no RT or guarding, no masses, no organomegaly   Extremities:   Warm and well perfused, anasarca   MSK:   No joint or muscle swelling, tenderness or deformity   Skin:   Skin color, texture, turgor normal, no rashes or lesions   Neurologic/Psychiatric:   CNII-XII intact, normal strength and sensation       throughout, no asterixis; normal affect, memory, judgement     and insight     Results:  Recent Labs   Lab 07/10/22  0315 07/11/22  0924 07/12/22  0555    138 135*   K 4.0 4.6 4.2    107 105   CO2 23 24 20*   BUN 18 27* 23*   CREATININE 2.5* 3.5* 2.8*   ESTGFRAFRICA 28* 19* 25*   EGFRNONAA 25* 16* 21*   * 134* 248*       Recent Labs   Lab 07/07/22  0853 07/07/22  1451 07/08/22  1517 07/09/22  0231 07/10/22  0315 07/11/22  0924 07/12/22  0555   CALCIUM 8.2*   < >  --    < > 7.6* 7.8* 8.2*   ALBUMIN 2.1*   < > 2.0*  --   --  1.4* 1.7*   PHOS 4.4  --   --   --   --   --   --     < > = values in this interval not displayed.       Recent Labs   Lab 07/08/22  0516   PTH, Intact 289.8 H       Recent Labs   Lab 07/09/22  0800 07/09/22  1140 07/09/22  2150 07/10/22  1153 07/10/22  2025 07/11/22  0523 07/11/22  1238 07/11/22  1710 07/11/22  2211 07/12/22  0734   POCTGLUCOSE 185* 136* 129* 132* 158* 148* 141* 153* 126* 223*       Recent Labs   Lab 01/26/22  0240 03/06/22  1135 05/15/22  1954   Hemoglobin A1C 6.8 H 5.3 5.8 H       Recent Labs   Lab 07/10/22  0315 07/11/22  0924 07/12/22  0555   WBC 7.99 8.48 7.03   HGB 7.4* 6.6* 9.6*   HCT 21.1* 19.1* 27.1*   PLT 99* 83* 114*   MCV 83 84 82   MCHC 35.1 34.6 35.4   MONO 7.4  0.6 7.8  0.7 2.0*  0.1*       Recent Labs   Lab 07/08/22  1517 07/11/22  0924 07/12/22  0555    BILITOT 1.2* 0.8 1.0   BILIDIR 0.4*  --   --    PROT 5.1* 4.4* 5.5*   ALBUMIN 2.0* 1.4* 1.7*   ALKPHOS 86 83 108   ALT 15 11 13   AST 17 13 16       Recent Labs   Lab 05/28/22  2036 06/11/22  1115 07/07/22  0912   Color, UA Yellow Yellow Yellow   Appearance, UA Clear Clear Clear   pH, UA 7.0 7.0 6.0   Specific Vivian, UA 1.020 1.015 1.020   Protein, UA 3+ A 3+ A 3+ A   Glucose, UA 2+ A 2+ A Negative   Ketones, UA Negative Negative Negative   Urobilinogen, UA Negative Negative Negative   Bilirubin (UA) Negative Negative Negative   Occult Blood UA 2+ A 1+ A 2+ A   Nitrite, UA Negative Negative Negative   RBC, UA 5 H 3 3   WBC, UA 1 4 8 H   Bacteria None Occasional Rare   Hyaline Casts, UA 0 0 0       Recent Labs   Lab 12/13/20  1042 04/23/21 1952 04/23/21 2006 04/11/22 2043 04/11/22  2345 04/12/22  0211   POC PH 7.457 H 7.458 H  --  7.365  --   --    POC PCO2 32.6 L 32.9 L  --  39.5  --   --    POC HCO3 23.0 L 23.3 L  --  22.6 L  --   --    POC PO2 26 LL 24 LL  --  25 L  --   --    POC SATURATED O2 53 L 47 L  --  42 L  --   --    POC BE -1 -1  --  -3  --   --    Sample VENOUS VENOUS   < > VENOUS VENOUS VENOUS    < > = values in this interval not displayed.     I have spent > minutes providing care for this patient for the above diagnoses. These services have included chart/data/imaging review, evaluation, exam, formulation of plan, , note preparation, and discussions with staff involved in this patient's care.    Mando Benitez MD    [facility]  [address] [place] [id number]  [phone] (p)  [phone] (f)

## 2022-07-12 NOTE — SUBJECTIVE & OBJECTIVE
Interval History: Off cardene gtt. Tunneled cath today.    Review of Systems   Constitutional:  Positive for fatigue. Negative for chills.   HENT:  Negative for sore throat.    Respiratory:  Negative for cough and shortness of breath.    Gastrointestinal:  Negative for abdominal pain, nausea and vomiting.   Genitourinary:  Negative for decreased urine volume.   Musculoskeletal:  Negative for back pain and neck pain.   Skin:  Positive for pallor.   Neurological:  Positive for weakness. Negative for seizures and headaches.   Psychiatric/Behavioral:  Negative for behavioral problems.    Objective:     Vital Signs (Most Recent):  Temp: 98.6 °F (37 °C) (07/12/22 0736)  Pulse: 105 (07/12/22 0736)  Resp: 18 (07/12/22 1047)  BP: (!) 156/98 (07/12/22 0736)  SpO2: (!) 93 % (07/12/22 0736)   Vital Signs (24h Range):  Temp:  [97.5 °F (36.4 °C)-99 °F (37.2 °C)] 98.6 °F (37 °C)  Pulse:  [] 105  Resp:  [13-41] 18  SpO2:  [82 %-98 %] 93 %  BP: (144-179)/() 156/98     Weight: 76.2 kg (167 lb 15.9 oz)  Body mass index is 30.73 kg/m².    Intake/Output Summary (Last 24 hours) at 7/12/2022 1103  Last data filed at 7/12/2022 0400  Gross per 24 hour   Intake 900 ml   Output 4270 ml   Net -3370 ml        Physical Exam  Vitals and nursing note reviewed.   Constitutional:       General: She is not in acute distress.     Appearance: She is well-developed. She is ill-appearing. She is not diaphoretic.      Comments: Anasarca.   HENT:      Head: Normocephalic and atraumatic.      Right Ear: External ear normal.      Left Ear: External ear normal.      Nose: Nose normal.      Mouth/Throat:      Mouth: Mucous membranes are moist.   Eyes:      General: No scleral icterus.     Extraocular Movements: Extraocular movements intact.      Conjunctiva/sclera: Conjunctivae normal.      Pupils: Pupils are equal, round, and reactive to light.   Neck:      Vascular: No JVD.   Cardiovascular:      Rate and Rhythm: Normal rate and regular rhythm.       Pulses: Normal pulses.      Heart sounds: Normal heart sounds. No murmur heard.    No friction rub. No gallop.   Pulmonary:      Effort: Pulmonary effort is normal. No respiratory distress.      Breath sounds: Normal breath sounds. No wheezing or rales.   Abdominal:      General: Bowel sounds are normal. There is no distension.      Palpations: Abdomen is soft.      Tenderness: There is no abdominal tenderness. There is no guarding or rebound.   Genitourinary:     Comments: Pelayo.  Musculoskeletal:         General: No tenderness. Normal range of motion.      Cervical back: Neck supple.      Right lower leg: Edema present.      Left lower leg: Edema present.   Skin:     General: Skin is warm and dry.      Coloration: Skin is not pale.      Findings: No erythema or rash.   Neurological:      Mental Status: She is oriented to person, place, and time.      Cranial Nerves: No cranial nerve deficit.      Motor: No weakness.   Psychiatric:         Mood and Affect: Mood normal.         Behavior: Behavior normal.       Significant Labs: All pertinent labs within the past 24 hours have been reviewed.  CBC:   Recent Labs   Lab 07/11/22  0924 07/12/22  0555   WBC 8.48 7.03   HGB 6.6* 9.6*   HCT 19.1* 27.1*   PLT 83* 114*       CMP:   Recent Labs   Lab 07/11/22  0924 07/12/22  0555    135*   K 4.6 4.2    105   CO2 24 20*   * 248*   BUN 27* 23*   CREATININE 3.5* 2.8*   CALCIUM 7.8* 8.2*   PROT 4.4* 5.5*   ALBUMIN 1.4* 1.7*   BILITOT 0.8 1.0   ALKPHOS 83 108   AST 13 16   ALT 11 13   ANIONGAP 7* 10   EGFRNONAA 16* 21*         Significant Imaging: I have reviewed all pertinent imaging results/findings within the past 24 hours.

## 2022-07-12 NOTE — ASSESSMENT & PLAN NOTE
Hx noted  Ml 2/2  hypertensive emergency and acute illness   S/p  2 units   Patient's anemia is currently controlled. Has recieved 2 units of PRBCs on 7/7.   Current CBC reviewed-   Lab Results   Component Value Date    HGB 9.6 (L) 07/12/2022    HCT 27.1 (L) 07/12/2022     Monitor serial CBC and transfuse if patient becomes hemodynamically unstable, symptomatic or H/H drops below 7/21.

## 2022-07-12 NOTE — PROGRESS NOTES
"[facility] Medicine  Progress Note    Patient Name: Tabby Howard  MRN: [id number]  Patient Class: IP- Inpatient   Admission Date: 2022  Length of Stay: 5 days  Attending Physician: Linda Cueto MD  Primary Care Provider: Primary Doctor No        Subjective:     Principal Problem:Acute renal failure superimposed on chronic kidney disease        HPI:  Tabby Howard is a 32 y.o. female with a PMHx of DM II, supraventricular tachycardia, heart failure with preserved ejection fraction (22), Sickle cell trait, and CKD (stage IV) who states that she came to the ED because of abnormal labs. She has been having shortness of breathness, feeling lethargic and generalized weakness for few weeks but has been feeling worse.  Patient had lab work done this morning and was informed of abnormal "kidney" results with referral to the ED for further evaluation and work up.  She endorses diffuse back pain over the past month, intermittent chills as well as diarrhea a few days ago that has since improved.  Patient specifies she is able to urinate but notes that the volume has decreased.  Also states that her  leg swelling has been getting worse due to her being "full of fluid."  LMP was in 2021.  The patient denies fever, N/V, abdominal pain on exam, CP or any other symptoms at this time.  PSHx includes EGD and .  Does not see a nephrologist for her CKD     The history is provided by the patient.          Overview/Hospital Course:  Patient was admitted for acute on chronic kidney disease with hyperkalemia and acidosis.  Patient was also found to be in hypertensive emergency.  On arrival patient was anemic Hb 4. Patient was transfused 2 units of blood with her new dialysis.  On arrival K 6.4, CO2 15. Nephrology was consulted patient was started on hemodialysis after dialysis access was placed.  For blood pressure control started NTG gtt and when blood pressure was not " controlled with nitro drip patient was switched to nicardipine gtt patient continued to be oliguric, hemoglobin improved after cut transition, hematology was consulted for further evaluation of anemia.  Workup showed hemolytic anemia which is attributed hemolysis during hypertensive emergency.  Patient's potassium improved acidosis improved after hemodialysis.       Interval History: Off cardene gtt. Tunneled cath today.    Review of Systems   Constitutional:  Positive for fatigue. Negative for chills.   HENT:  Negative for sore throat.    Respiratory:  Negative for cough and shortness of breath.    Gastrointestinal:  Negative for abdominal pain, nausea and vomiting.   Genitourinary:  Negative for decreased urine volume.   Musculoskeletal:  Negative for back pain and neck pain.   Skin:  Positive for pallor.   Neurological:  Positive for weakness. Negative for seizures and headaches.   Psychiatric/Behavioral:  Negative for behavioral problems.    Objective:     Vital Signs (Most Recent):  Temp: 98.6 °F (37 °C) (07/12/22 0736)  Pulse: 105 (07/12/22 0736)  Resp: 18 (07/12/22 1047)  BP: (!) 156/98 (07/12/22 0736)  SpO2: (!) 93 % (07/12/22 0736)   Vital Signs (24h Range):  Temp:  [97.5 °F (36.4 °C)-99 °F (37.2 °C)] 98.6 °F (37 °C)  Pulse:  [] 105  Resp:  [13-41] 18  SpO2:  [82 %-98 %] 93 %  BP: (144-179)/() 156/98     Weight: 76.2 kg (167 lb 15.9 oz)  Body mass index is 30.73 kg/m².    Intake/Output Summary (Last 24 hours) at 7/12/2022 1103  Last data filed at 7/12/2022 0400  Gross per 24 hour   Intake 900 ml   Output 4270 ml   Net -3370 ml        Physical Exam  Vitals and nursing note reviewed.   Constitutional:       General: She is not in acute distress.     Appearance: She is well-developed. She is ill-appearing. She is not diaphoretic.      Comments: Anasarca.   HENT:      Head: Normocephalic and atraumatic.      Right Ear: External ear normal.      Left Ear: External ear normal.      Nose: Nose normal.       Mouth/Throat:      Mouth: Mucous membranes are moist.   Eyes:      General: No scleral icterus.     Extraocular Movements: Extraocular movements intact.      Conjunctiva/sclera: Conjunctivae normal.      Pupils: Pupils are equal, round, and reactive to light.   Neck:      Vascular: No JVD.   Cardiovascular:      Rate and Rhythm: Normal rate and regular rhythm.      Pulses: Normal pulses.      Heart sounds: Normal heart sounds. No murmur heard.    No friction rub. No gallop.   Pulmonary:      Effort: Pulmonary effort is normal. No respiratory distress.      Breath sounds: Normal breath sounds. No wheezing or rales.   Abdominal:      General: Bowel sounds are normal. There is no distension.      Palpations: Abdomen is soft.      Tenderness: There is no abdominal tenderness. There is no guarding or rebound.   Genitourinary:     Comments: Pelayo.  Musculoskeletal:         General: No tenderness. Normal range of motion.      Cervical back: Neck supple.      Right lower leg: Edema present.      Left lower leg: Edema present.   Skin:     General: Skin is warm and dry.      Coloration: Skin is not pale.      Findings: No erythema or rash.   Neurological:      Mental Status: She is oriented to person, place, and time.      Cranial Nerves: No cranial nerve deficit.      Motor: No weakness.   Psychiatric:         Mood and Affect: Mood normal.         Behavior: Behavior normal.       Significant Labs: All pertinent labs within the past 24 hours have been reviewed.  CBC:   Recent Labs   Lab 07/11/22  0924 07/12/22  0555   WBC 8.48 7.03   HGB 6.6* 9.6*   HCT 19.1* 27.1*   PLT 83* 114*       CMP:   Recent Labs   Lab 07/11/22  0924 07/12/22  0555    135*   K 4.6 4.2    105   CO2 24 20*   * 248*   BUN 27* 23*   CREATININE 3.5* 2.8*   CALCIUM 7.8* 8.2*   PROT 4.4* 5.5*   ALBUMIN 1.4* 1.7*   BILITOT 0.8 1.0   ALKPHOS 83 108   AST 13 16   ALT 11 13   ANIONGAP 7* 10   EGFRNONAA 16* 21*         Significant Imaging:  I have reviewed all pertinent imaging results/findings within the past 24 hours.      Assessment/Plan:      * Acute renal failure superimposed on chronic kidney disease  New HD  Follow renal panel and electrolytes closely.  Follow renal recommendations.  CT r/o renal stone  Adjust renal dose medications for creatinine clearance 10-50cc/min.  Avoid NSAIDs, De Leon-II inhibitors, ACE-I, Angiotensin Receptor Blockers, or Aminoglycosides.  Urine analysis.  Will defer following workup to nephro   JUAN A, C3, C4, ESR, UPEP and SPEP  Tunneled cath 7/12                  Hypertensive emergency  Off  Cardene drip  Started on amlodipine and isosorbide mononitrate  7/11 increased amlodipine dose    Anemia  Patient's anemia is currently controlled. Has recieved 2 units of PRBCs on 7/7. Etiology likely d/t Anemia of acute illness + Hemolytic anemia  Retic count elevated   Will cont to trend HnH   Current CBC reviewed-   Lab Results   Component Value Date    HGB 9.6 (L) 07/12/2022    HCT 27.1 (L) 07/12/2022     Monitor serial CBC and transfuse if patient becomes hemodynamically unstable, symptomatic or H/H drops below 7/21.   7/11 Transfuse 1 unit PRBC      Pericardial effusion  ECHO shows · Moderate circumferential pericardial effusion.     The left ventricle is normal in size with moderate concentric hypertrophy and low normal systolic function.  · The estimated ejection fraction is 50%.  · Normal left ventricular diastolic function.  · Normal right ventricular size with normal right ventricular systolic function.          Seizure  Hx noted      Sickle cell trait  Hx noted        CKD (chronic kidney disease), stage IV  Hx noted      Anemia of chronic kidney failure  Hx noted  Ml 2/2  hypertensive emergency and acute illness   S/p  2 units   Patient's anemia is currently controlled. Has recieved 2 units of PRBCs on 7/7.   Current CBC reviewed-   Lab Results   Component Value Date    HGB 9.6 (L) 07/12/2022    HCT 27.1 (L) 07/12/2022      Monitor serial CBC and transfuse if patient becomes hemodynamically unstable, symptomatic or H/H drops below 7/21.         Type II diabetes mellitus     Patient's FSGs are controlled on current medication regimen.  Last A1c reviewed-   Lab Results   Component Value Date    HGBA1C 5.8 (H) 05/15/2022     Most recent fingerstick glucose reviewed-   Recent Labs   Lab 07/08/22 2032 07/09/22  0800   POCTGLUCOSE 176* 185*     Current correctional scale  Medium  Maintain anti-hyperglycemic dose as follows-   Antihyperglycemics (From admission, onward)            Start     Stop Route Frequency Ordered    07/07/22 1719  insulin aspart U-100 pen 1-10 Units         -- SubQ Before meals & nightly PRN 07/07/22 1619        Hold Oral hypoglycemics while patient is in the hospital.          VTE Risk Mitigation (From admission, onward)         Ordered     heparin (porcine) injection 4,000 Units  As needed (PRN)         07/09/22 2022     IP VTE HIGH RISK PATIENT  Once         07/09/22 1957     Place JOCELYNE hose  Until discontinued         07/09/22 1957     heparin (porcine) injection 4,000 Units  Once         07/08/22 1624                Discharge Planning   TATIANA: 7/14/2022     Code Status: Prior   Is the patient medically ready for discharge?:     Reason for patient still in hospital (select all that apply): Patient trending condition and Treatment  Discharge Plan A: Home with family                  Linda Cueto MD  Department of Hospital Medicine   [facility]

## 2022-07-12 NOTE — PLAN OF CARE
POC discussed with pt, pt verbalized understanding. Oriented x4. PIV cdi, flushed. Trialysis catheter intact. NSR/Tachy on tele. NPO at midnight per orders for hemasplit line placement today. Blood glucose monitored, no insulin needed per orders. Dialysis completed tonight. Pt ambulated independently to the restroom. Medicated with prn's for pain to the back and neck. Call light in reach, bed alarm set, safety maintained. No complaints or requests at this time, will continue to monitor.

## 2022-07-12 NOTE — CARE UPDATE
07/11/22 1903   PRE-TX-O2   O2 Device (Oxygen Therapy) room air   SpO2 97 %   Pulse Oximetry Type Intermittent

## 2022-07-12 NOTE — NURSING
Returns to room 306 S/P Apolinar catheter insertion to left chest wall Per Dr Gan,AAO x 4 VSS afebrile Surgical site clean dry and intact with no signs of drainage noted at this time Blood glucose monitored

## 2022-07-12 NOTE — ASSESSMENT & PLAN NOTE
New HD  Follow renal panel and electrolytes closely.  Follow renal recommendations.  CT r/o renal stone  Adjust renal dose medications for creatinine clearance 10-50cc/min.  Avoid NSAIDs, De Leon-II inhibitors, ACE-I, Angiotensin Receptor Blockers, or Aminoglycosides.  Urine analysis.  Will defer following workup to nephro   JUAN A, C3, C4, ESR, UPEP and SPEP  Tunneled cath 7/12

## 2022-07-12 NOTE — PLAN OF CARE
07/12/22 0659   Patient Assessment/Suction   Level of Consciousness (AVPU) alert   Respiratory Effort Normal;Unlabored   Expansion/Accessory Muscles/Retractions no retractions;no use of accessory muscles   Rhythm/Pattern, Respiratory depth regular;pattern regular;unlabored   Cough Frequency no cough   PRE-TX-O2   O2 Device (Oxygen Therapy) room air   SpO2 (!) 92 %   Pulse Oximetry Type Intermittent   $ Pulse Oximetry - Multiple Charge Pulse Oximetry - Multiple   Pulse 108   Resp 16

## 2022-07-12 NOTE — TRANSFER OF CARE
"Anesthesia Transfer of Care Note    Patient: Tabby Howard    Procedure(s) Performed: Procedure(s) (LRB):  INSERTION-CATHETER-JOSEPH (Left)    Patient location: PACU    Anesthesia Type: general    Transport from OR: Transported from OR on 2-3 L/min O2 by NC with adequate spontaneous ventilation    Post pain: adequate analgesia    Post assessment: no apparent anesthetic complications and tolerated procedure well    Post vital signs: stable    Level of consciousness: sedated    Nausea/Vomiting: no nausea/vomiting    Complications: none    Transfer of care protocol was followed      Last vitals:   Visit Vitals  BP (!) 157/102   Pulse 100   Temp 36.8 °C (98.2 °F)   Resp 18   Ht 5' 2" (1.575 m)   Wt 76.2 kg (167 lb 15.9 oz)   LMP 12/01/2021 (Approximate)   SpO2 95%   Breastfeeding No   BMI 30.73 kg/m²     "

## 2022-07-12 NOTE — ASSESSMENT & PLAN NOTE
Patient's anemia is currently controlled. Has recieved 2 units of PRBCs on 7/7. Etiology likely d/t Anemia of acute illness + Hemolytic anemia  Retic count elevated   Will cont to trend Hn   Current CBC reviewed-   Lab Results   Component Value Date    HGB 9.6 (L) 07/12/2022    HCT 27.1 (L) 07/12/2022     Monitor serial CBC and transfuse if patient becomes hemodynamically unstable, symptomatic or H/H drops below 7/21.   7/11 Transfuse 1 unit PRBC

## 2022-07-12 NOTE — PROGRESS NOTES
HD tx tolerated well  Net UF 3L       07/11/22 2120   Handoff Report   Received From [facility]   Given To Destinee   Vital Signs   Temp 98.4 °F (36.9 °C)   Pulse 107   Resp 18   BP (!) 163/95   Assessments (Pre/Post)   Blood Liters Processed (BLP) 57   Transport Modality not applicable   Level of Consciousness (AVPU) alert   Dialyzer Clearance mildly streaked   Trialysis (Dialysis) Catheter 07/09/22 1900 right internal jugular   Placement Date/Time: 07/09/22 1900   Present Prior to Hospital Arrival?: No  Hand Hygiene: Performed  Barrier Precautions: Performed  Skin Antisepsis: chlorhexidine (without alcohol)  Location: right internal jugular  Pressure Injectable Catheter: Yes...   Site Assessment No drainage;No redness;No swelling;No warmth   Line Securement Device Secured with sutures   Dressing Type Biopatch in place;Central line dressing   Dressing Status Clean;Dry;Intact   Dressing Intervention Integrity maintained   Venous Patency/Care flushed w/o difficulty;heparin locked   Arterial Patency/Care flushed w/o difficulty;heparin locked   Flows Good   Line Necessity Review CRRT/HD   Post-Hemodialysis Assessment   Rinseback Volume (mL) 250 mL   Blood Volume Processed (Liters) 57 L   Dialyzer Clearance Lightly streaked   Additional Fluid Intake (mL) 500 mL   Total UF (mL) 3500 mL   Net Fluid Removal 3000   Patient Response to Treatment tolerated well   Post-Treatment Weight 73.2 kg (161 lb 6 oz)   Treatment Weight Change -3   Post-Hemodialysis Comments Tx complete, pt reinfused per protocol

## 2022-07-12 NOTE — ANESTHESIA PREPROCEDURE EVALUATION
07/12/2022  Tabby Howard is a 32 y.o., female.      Pre-op Assessment    I have reviewed the NPO Status.   I have reviewed the Medications.     Review of Systems  Anesthesia Hx:  No problems with previous Anesthesia    Cardiovascular:   Exercise tolerance: poor Hypertension CHF ECG has been reviewed. Pericardial effusion   Renal/:   Chronic Renal Disease, ESRD    Hepatic/GI:  Hepatic/GI Normal    Neurological:   Seizures    Endocrine:   Diabetes, poorly controlled, type 2  Obesity / BMI > 30      Physical Exam  General: Lethargic    Airway:  Mallampati: II   Mouth Opening: Normal  TM Distance: Normal  Tongue: Normal  Neck ROM: Normal ROM    Dental:  Intact    Chest/Lungs:  Normal Respiratory Rate        Anesthesia Plan  Type of Anesthesia, risks & benefits discussed:    Anesthesia Type: Gen Natural Airway  Intra-op Monitoring Plan: Standard ASA Monitors  Post Op Pain Control Plan: multimodal analgesia and IV/PO Opioids PRN  Induction:  IV  Informed Consent: Informed consent signed with the Patient and all parties understand the risks and agree with anesthesia plan.  All questions answered.   ASA Score: 4    Ready For Surgery From Anesthesia Perspective.     .

## 2022-07-13 LAB
ALBUMIN SERPL BCP-MCNC: 1.7 G/DL (ref 3.5–5.2)
ALP SERPL-CCNC: 100 U/L (ref 55–135)
ALT SERPL W/O P-5'-P-CCNC: 11 U/L (ref 10–44)
ANION GAP SERPL CALC-SCNC: 12 MMOL/L (ref 8–16)
AST SERPL-CCNC: 10 U/L (ref 10–40)
BASOPHILS # BLD AUTO: 0 K/UL (ref 0–0.2)
BASOPHILS NFR BLD: 0 % (ref 0–1.9)
BILIRUB SERPL-MCNC: 0.5 MG/DL (ref 0.1–1)
BUN SERPL-MCNC: 38 MG/DL (ref 6–20)
CALCIUM SERPL-MCNC: 7.9 MG/DL (ref 8.7–10.5)
CHLORIDE SERPL-SCNC: 101 MMOL/L (ref 95–110)
CO2 SERPL-SCNC: 20 MMOL/L (ref 23–29)
CREAT SERPL-MCNC: 3.8 MG/DL (ref 0.5–1.4)
DIFFERENTIAL METHOD: ABNORMAL
EOSINOPHIL # BLD AUTO: 0 K/UL (ref 0–0.5)
EOSINOPHIL NFR BLD: 0 % (ref 0–8)
ERYTHROCYTE [DISTWIDTH] IN BLOOD BY AUTOMATED COUNT: 13.6 % (ref 11.5–14.5)
EST. GFR  (AFRICAN AMERICAN): 17 ML/MIN/1.73 M^2
EST. GFR  (NON AFRICAN AMERICAN): 15 ML/MIN/1.73 M^2
GLUCOSE SERPL-MCNC: 567 MG/DL (ref 70–110)
HCT VFR BLD AUTO: 25.5 % (ref 37–48.5)
HGB A2 MFR BLD HPLC: 3.3 % (ref 2.2–3.2)
HGB BLD-MCNC: 9.1 G/DL (ref 12–16)
HGB FRACT BLD ELPH-IMP: ABNORMAL
HGB FRACT BLD ELPH-IMP: ABNORMAL
IMM GRANULOCYTES # BLD AUTO: 0.03 K/UL (ref 0–0.04)
IMM GRANULOCYTES NFR BLD AUTO: 0.6 % (ref 0–0.5)
LYMPHOCYTES # BLD AUTO: 0.4 K/UL (ref 1–4.8)
LYMPHOCYTES NFR BLD: 7.7 % (ref 18–48)
MCH RBC QN AUTO: 29 PG (ref 27–31)
MCHC RBC AUTO-ENTMCNC: 35.7 G/DL (ref 32–36)
MCV RBC AUTO: 81 FL (ref 82–98)
MONOCYTES # BLD AUTO: 0.2 K/UL (ref 0.3–1)
MONOCYTES NFR BLD: 3.6 % (ref 4–15)
NEUTROPHILS # BLD AUTO: 4.4 K/UL (ref 1.8–7.7)
NEUTROPHILS NFR BLD: 88.1 % (ref 38–73)
NRBC BLD-RTO: 0 /100 WBC
PATHOLOGIST INTERPRETATION SPE: NORMAL
PLATELET # BLD AUTO: 139 K/UL (ref 150–450)
PMV BLD AUTO: 10.4 FL (ref 9.2–12.9)
POCT GLUCOSE: 285 MG/DL (ref 70–110)
POCT GLUCOSE: 310 MG/DL (ref 70–110)
POCT GLUCOSE: 361 MG/DL (ref 70–110)
POCT GLUCOSE: >500 MG/DL (ref 70–110)
POTASSIUM SERPL-SCNC: 5.1 MMOL/L (ref 3.5–5.1)
PROT SERPL-MCNC: 5.2 G/DL (ref 6–8.4)
RBC # BLD AUTO: 3.14 M/UL (ref 4–5.4)
SODIUM SERPL-SCNC: 133 MMOL/L (ref 136–145)
WBC # BLD AUTO: 4.96 K/UL (ref 3.9–12.7)

## 2022-07-13 PROCEDURE — 99233 SBSQ HOSP IP/OBS HIGH 50: CPT | Mod: ,,, | Performed by: INTERNAL MEDICINE

## 2022-07-13 PROCEDURE — 80100014 HC HEMODIALYSIS 1:1

## 2022-07-13 PROCEDURE — 63600175 PHARM REV CODE 636 W HCPCS: Performed by: HOSPITALIST

## 2022-07-13 PROCEDURE — 25000003 PHARM REV CODE 250: Performed by: HOSPITALIST

## 2022-07-13 PROCEDURE — 25000003 PHARM REV CODE 250: Performed by: INTERNAL MEDICINE

## 2022-07-13 PROCEDURE — 94761 N-INVAS EAR/PLS OXIMETRY MLT: CPT

## 2022-07-13 PROCEDURE — 80053 COMPREHEN METABOLIC PANEL: CPT | Performed by: HOSPITALIST

## 2022-07-13 PROCEDURE — 85025 COMPLETE CBC W/AUTO DIFF WBC: CPT | Performed by: HOSPITALIST

## 2022-07-13 PROCEDURE — C9399 UNCLASSIFIED DRUGS OR BIOLOG: HCPCS | Performed by: HOSPITALIST

## 2022-07-13 PROCEDURE — 63600175 PHARM REV CODE 636 W HCPCS: Performed by: INTERNAL MEDICINE

## 2022-07-13 PROCEDURE — 12000002 HC ACUTE/MED SURGE SEMI-PRIVATE ROOM

## 2022-07-13 PROCEDURE — 99233 PR SUBSEQUENT HOSPITAL CARE,LEVL III: ICD-10-PCS | Mod: ,,, | Performed by: INTERNAL MEDICINE

## 2022-07-13 PROCEDURE — 36415 COLL VENOUS BLD VENIPUNCTURE: CPT | Performed by: HOSPITALIST

## 2022-07-13 RX ORDER — INSULIN ASPART 100 [IU]/ML
10 INJECTION, SOLUTION INTRAVENOUS; SUBCUTANEOUS
Status: DISCONTINUED | OUTPATIENT
Start: 2022-07-13 | End: 2022-07-13

## 2022-07-13 RX ORDER — INSULIN ASPART 100 [IU]/ML
10 INJECTION, SOLUTION INTRAVENOUS; SUBCUTANEOUS
Status: DISCONTINUED | OUTPATIENT
Start: 2022-07-13 | End: 2022-07-17 | Stop reason: HOSPADM

## 2022-07-13 RX ORDER — LEVETIRACETAM 500 MG/1
500 TABLET ORAL
Status: DISCONTINUED | OUTPATIENT
Start: 2022-07-13 | End: 2022-07-17 | Stop reason: HOSPADM

## 2022-07-13 RX ORDER — INSULIN ASPART 100 [IU]/ML
15 INJECTION, SOLUTION INTRAVENOUS; SUBCUTANEOUS
Status: DISCONTINUED | OUTPATIENT
Start: 2022-07-13 | End: 2022-07-13

## 2022-07-13 RX ADMIN — LEVETIRACETAM 500 MG: 500 TABLET, FILM COATED ORAL at 08:07

## 2022-07-13 RX ADMIN — HYDROCODONE BITARTRATE AND ACETAMINOPHEN 1 TABLET: 5; 325 TABLET ORAL at 11:07

## 2022-07-13 RX ADMIN — EPOETIN ALFA-EPBX 10000 UNITS: 10000 INJECTION, SOLUTION INTRAVENOUS; SUBCUTANEOUS at 02:07

## 2022-07-13 RX ADMIN — INSULIN ASPART 10 UNITS: 100 INJECTION, SOLUTION INTRAVENOUS; SUBCUTANEOUS at 04:07

## 2022-07-13 RX ADMIN — INSULIN ASPART 10 UNITS: 100 INJECTION, SOLUTION INTRAVENOUS; SUBCUTANEOUS at 12:07

## 2022-07-13 RX ADMIN — AMLODIPINE BESYLATE 10 MG: 5 TABLET ORAL at 04:07

## 2022-07-13 RX ADMIN — INSULIN ASPART 10 UNITS: 100 INJECTION, SOLUTION INTRAVENOUS; SUBCUTANEOUS at 07:07

## 2022-07-13 RX ADMIN — METHYLPREDNISOLONE SODIUM SUCCINATE 125 MG: 125 INJECTION, POWDER, FOR SOLUTION INTRAMUSCULAR; INTRAVENOUS at 03:07

## 2022-07-13 RX ADMIN — HEPARIN SODIUM 4000 UNITS: 1000 INJECTION INTRAVENOUS; SUBCUTANEOUS at 02:07

## 2022-07-13 RX ADMIN — INSULIN HUMAN 10 UNITS: 100 INJECTION, SOLUTION PARENTERAL at 10:07

## 2022-07-13 RX ADMIN — METHYLPREDNISOLONE SODIUM SUCCINATE 125 MG: 125 INJECTION, POWDER, FOR SOLUTION INTRAMUSCULAR; INTRAVENOUS at 08:07

## 2022-07-13 RX ADMIN — ISOSORBIDE MONONITRATE 60 MG: 60 TABLET, EXTENDED RELEASE ORAL at 04:07

## 2022-07-13 RX ADMIN — INSULIN DETEMIR 8 UNITS: 100 INJECTION, SOLUTION SUBCUTANEOUS at 08:07

## 2022-07-13 RX ADMIN — LABETALOL HYDROCHLORIDE 10 MG: 5 INJECTION INTRAVENOUS at 04:07

## 2022-07-13 RX ADMIN — INSULIN ASPART 3 UNITS: 100 INJECTION, SOLUTION INTRAVENOUS; SUBCUTANEOUS at 08:07

## 2022-07-13 RX ADMIN — LEVETIRACETAM 500 MG: 500 TABLET, FILM COATED ORAL at 11:07

## 2022-07-13 RX ADMIN — FAMOTIDINE 20 MG: 20 TABLET ORAL at 08:07

## 2022-07-13 NOTE — SUBJECTIVE & OBJECTIVE
Interval History: Glucose elevated from IV steroids. Basal insulin added. Tunneled cath placed yeseterday.    Review of Systems   Constitutional:  Positive for fatigue. Negative for chills.   HENT:  Negative for sore throat.    Respiratory:  Negative for cough and shortness of breath.    Gastrointestinal:  Negative for abdominal pain, nausea and vomiting.   Genitourinary:  Negative for decreased urine volume.   Musculoskeletal:  Negative for back pain and neck pain.   Skin:  Positive for pallor.   Neurological:  Positive for weakness. Negative for seizures and headaches.   Psychiatric/Behavioral:  Negative for behavioral problems.    Objective:     Vital Signs (Most Recent):  Temp: 97.7 °F (36.5 °C) (07/13/22 0813)  Pulse: 74 (07/13/22 0813)  Resp: 17 (07/13/22 0813)  BP: (!) 140/72 (07/13/22 0813)  SpO2: 97 % (07/13/22 0813)   Vital Signs (24h Range):  Temp:  [97.7 °F (36.5 °C)-99 °F (37.2 °C)] 97.7 °F (36.5 °C)  Pulse:  [] 74  Resp:  [0-20] 17  SpO2:  [92 %-98 %] 97 %  BP: (109-157)/() 140/72     Weight: 76.2 kg (167 lb 15.9 oz)  Body mass index is 30.73 kg/m².    Intake/Output Summary (Last 24 hours) at 7/13/2022 1011  Last data filed at 7/13/2022 0600  Gross per 24 hour   Intake 1857.07 ml   Output 300 ml   Net 1557.07 ml        Physical Exam  Vitals and nursing note reviewed.   Constitutional:       General: She is not in acute distress.     Appearance: She is well-developed. She is ill-appearing. She is not diaphoretic.      Comments: Anasarca.   HENT:      Head: Normocephalic and atraumatic.      Right Ear: External ear normal.      Left Ear: External ear normal.      Nose: Nose normal.      Mouth/Throat:      Mouth: Mucous membranes are moist.   Eyes:      General: No scleral icterus.     Extraocular Movements: Extraocular movements intact.      Conjunctiva/sclera: Conjunctivae normal.      Pupils: Pupils are equal, round, and reactive to light.   Neck:      Vascular: No JVD.   Cardiovascular:       Rate and Rhythm: Normal rate and regular rhythm.      Pulses: Normal pulses.      Heart sounds: Normal heart sounds. No murmur heard.    No friction rub. No gallop.   Pulmonary:      Effort: Pulmonary effort is normal. No respiratory distress.      Breath sounds: Normal breath sounds. No wheezing or rales.   Abdominal:      General: Bowel sounds are normal. There is no distension.      Palpations: Abdomen is soft.      Tenderness: There is no abdominal tenderness. There is no guarding or rebound.   Genitourinary:     Comments: Pelayo.  Musculoskeletal:         General: No tenderness. Normal range of motion.      Cervical back: Neck supple.      Right lower leg: Edema present.      Left lower leg: Edema present.   Skin:     General: Skin is warm and dry.      Coloration: Skin is not pale.      Findings: No erythema or rash.   Neurological:      Mental Status: She is oriented to person, place, and time.      Cranial Nerves: No cranial nerve deficit.      Motor: No weakness.   Psychiatric:         Mood and Affect: Mood normal.         Behavior: Behavior normal.       Significant Labs: All pertinent labs within the past 24 hours have been reviewed.  CBC:   Recent Labs   Lab 07/12/22  0555 07/13/22  0551   WBC 7.03 4.96   HGB 9.6* 9.1*   HCT 27.1* 25.5*   * 139*       CMP:   Recent Labs   Lab 07/12/22  0555 07/13/22  0551   * 133*   K 4.2 5.1    101   CO2 20* 20*   * 567*   BUN 23* 38*   CREATININE 2.8* 3.8*   CALCIUM 8.2* 7.9*   PROT 5.5* 5.2*   ALBUMIN 1.7* 1.7*   BILITOT 1.0 0.5   ALKPHOS 108 100   AST 16 10   ALT 13 11   ANIONGAP 10 12   EGFRNONAA 21* 15*         Significant Imaging: I have reviewed all pertinent imaging results/findings within the past 24 hours.

## 2022-07-13 NOTE — NURSING
Pt stable and sleeping in bed but easily aroused to voice stimuli.  IV sites to RIJ and R hand patent CDI.  No c/o pain voiced.  Tele monitor on. Pt in sinus tach rhythm.  Will CTM.

## 2022-07-13 NOTE — ASSESSMENT & PLAN NOTE
Hx noted  Ml 2/2  hypertensive emergency and acute illness   S/p  2 units   Patient's anemia is currently controlled. Has recieved 2 units of PRBCs on 7/7.   Current CBC reviewed-   Lab Results   Component Value Date    HGB 9.1 (L) 07/13/2022    HCT 25.5 (L) 07/13/2022     Monitor serial CBC and transfuse if patient becomes hemodynamically unstable, symptomatic or H/H drops below 7/21.

## 2022-07-13 NOTE — PLAN OF CARE
Voicemail left for [facility] to inform facility that patient is not discharging today. Will need to push back start of care. CM following       07/13/22 0996   Post-Acute Status   Post-Acute Authorization Dialysis   Diaylsis Status Set-up Complete/Auth obtained

## 2022-07-13 NOTE — ASSESSMENT & PLAN NOTE
Patient's FSGs are uncontrolled on current medication regimen. Hyperglycemic from steroids  Last A1c reviewed-   Lab Results   Component Value Date    HGBA1C 5.8 (H) 05/15/2022     Most recent fingerstick glucose reviewed-   Recent Labs   Lab 07/12/22  1147 07/12/22  1646 07/12/22  1937 07/13/22  0732   POCTGLUCOSE 180* 253* 277* >500*     Current correctional scale  Medium  Increase anti-hyperglycemic dose as follows-   Antihyperglycemics (From admission, onward)            Start     Stop Route Frequency Ordered    07/13/22 1115  insulin regular injection 10 Units 0.1 mL         -- SubQ Once 07/13/22 1010    07/13/22 0900  insulin detemir U-100 pen 8 Units         -- SubQ 2 times daily 07/13/22 0714    07/07/22 1719  insulin aspart U-100 pen 1-10 Units         -- SubQ Before meals & nightly PRN 07/07/22 1619        Hold Oral hypoglycemics while patient is in the hospital.

## 2022-07-13 NOTE — PLAN OF CARE
Problem: Infection  Goal: Absence of Infection Signs and Symptoms  Outcome: Ongoing, Progressing     Problem: Device-Related Complication Risk (Hemodialysis)  Goal: Safe, Effective Therapy Delivery  Outcome: Ongoing, Progressing     Problem: Adult Inpatient Plan of Care  Goal: Absence of Hospital-Acquired Illness or Injury  Outcome: Ongoing, Progressing     Problem: Fall Injury Risk  Goal: Absence of Fall and Fall-Related Injury  Outcome: Ongoing, Progressing     Problem: Skin Injury Risk Increased  Goal: Skin Health and Integrity  Outcome: Ongoing, Progressing       Pts blood glucose has remained uncontrolled all day >500.  Pt hasn't had any falls today and uses the call light appropriately for assistance.  Pt is non-compliant after much education on diet.

## 2022-07-13 NOTE — PROGRESS NOTES
"[facility]  Progress Note    Patient Name: Tabby Howard  MRN: [id number]  Patient Class: IP- Inpatient   Admission Date: 2022  Length of Stay: 6 days  Attending Physician: Linda Cueto MD  Primary Care Provider: Primary Doctor No        Subjective:     Principal Problem:Acute renal failure superimposed on chronic kidney disease        HPI:  Tabby Howard is a 32 y.o. female with a PMHx of DM II, supraventricular tachycardia, heart failure with preserved ejection fraction (22), Sickle cell trait, and CKD (stage IV) who states that she came to the ED because of abnormal labs. She has been having shortness of breathness, feeling lethargic and generalized weakness for few weeks but has been feeling worse.  Patient had lab work done this morning and was informed of abnormal "kidney" results with referral to the ED for further evaluation and work up.  She endorses diffuse back pain over the past month, intermittent chills as well as diarrhea a few days ago that has since improved.  Patient specifies she is able to urinate but notes that the volume has decreased.  Also states that her  leg swelling has been getting worse due to her being "full of fluid."  LMP was in 2021.  The patient denies fever, N/V, abdominal pain on exam, CP or any other symptoms at this time.  PSHx includes EGD and .  Does not see a nephrologist for her CKD     The history is provided by the patient.          Overview/Hospital Course:  Patient was admitted for acute on chronic kidney disease with hyperkalemia and acidosis.  Patient was also found to be in hypertensive emergency.  On arrival patient was anemic Hb 4. Patient was transfused 2 units of blood with her new dialysis.  On arrival K 6.4, CO2 15. Nephrology was consulted patient was started on hemodialysis after dialysis access was placed.  For blood pressure control started NTG gtt and when blood pressure was not " controlled with nitro drip patient was switched to nicardipine gtt patient continued to be oliguric, hemoglobin improved after cut transition, hematology was consulted for further evaluation of anemia.  Workup showed hemolytic anemia which is attributed hemolysis during hypertensive emergency.  Patient's potassium improved acidosis improved after hemodialysis.       Interval History: Glucose elevated from IV steroids. Basal insulin added. Tunneled cath placed yeseterday.    Review of Systems   Constitutional:  Positive for fatigue. Negative for chills.   HENT:  Negative for sore throat.    Respiratory:  Negative for cough and shortness of breath.    Gastrointestinal:  Negative for abdominal pain, nausea and vomiting.   Genitourinary:  Negative for decreased urine volume.   Musculoskeletal:  Negative for back pain and neck pain.   Skin:  Positive for pallor.   Neurological:  Positive for weakness. Negative for seizures and headaches.   Psychiatric/Behavioral:  Negative for behavioral problems.    Objective:     Vital Signs (Most Recent):  Temp: 97.7 °F (36.5 °C) (07/13/22 0813)  Pulse: 74 (07/13/22 0813)  Resp: 17 (07/13/22 0813)  BP: (!) 140/72 (07/13/22 0813)  SpO2: 97 % (07/13/22 0813)   Vital Signs (24h Range):  Temp:  [97.7 °F (36.5 °C)-99 °F (37.2 °C)] 97.7 °F (36.5 °C)  Pulse:  [] 74  Resp:  [0-20] 17  SpO2:  [92 %-98 %] 97 %  BP: (109-157)/() 140/72     Weight: 76.2 kg (167 lb 15.9 oz)  Body mass index is 30.73 kg/m².    Intake/Output Summary (Last 24 hours) at 7/13/2022 1011  Last data filed at 7/13/2022 0600  Gross per 24 hour   Intake 1857.07 ml   Output 300 ml   Net 1557.07 ml        Physical Exam  Vitals and nursing note reviewed.   Constitutional:       General: She is not in acute distress.     Appearance: She is well-developed. She is ill-appearing. She is not diaphoretic.      Comments: Anasarca.   HENT:      Head: Normocephalic and atraumatic.      Right Ear: External ear normal.       Left Ear: External ear normal.      Nose: Nose normal.      Mouth/Throat:      Mouth: Mucous membranes are moist.   Eyes:      General: No scleral icterus.     Extraocular Movements: Extraocular movements intact.      Conjunctiva/sclera: Conjunctivae normal.      Pupils: Pupils are equal, round, and reactive to light.   Neck:      Vascular: No JVD.   Cardiovascular:      Rate and Rhythm: Normal rate and regular rhythm.      Pulses: Normal pulses.      Heart sounds: Normal heart sounds. No murmur heard.    No friction rub. No gallop.   Pulmonary:      Effort: Pulmonary effort is normal. No respiratory distress.      Breath sounds: Normal breath sounds. No wheezing or rales.   Abdominal:      General: Bowel sounds are normal. There is no distension.      Palpations: Abdomen is soft.      Tenderness: There is no abdominal tenderness. There is no guarding or rebound.   Genitourinary:     Comments: Pelayo.  Musculoskeletal:         General: No tenderness. Normal range of motion.      Cervical back: Neck supple.      Right lower leg: Edema present.      Left lower leg: Edema present.   Skin:     General: Skin is warm and dry.      Coloration: Skin is not pale.      Findings: No erythema or rash.   Neurological:      Mental Status: She is oriented to person, place, and time.      Cranial Nerves: No cranial nerve deficit.      Motor: No weakness.   Psychiatric:         Mood and Affect: Mood normal.         Behavior: Behavior normal.       Significant Labs: All pertinent labs within the past 24 hours have been reviewed.  CBC:   Recent Labs   Lab 07/12/22  0555 07/13/22  0551   WBC 7.03 4.96   HGB 9.6* 9.1*   HCT 27.1* 25.5*   * 139*       CMP:   Recent Labs   Lab 07/12/22  0555 07/13/22  0551   * 133*   K 4.2 5.1    101   CO2 20* 20*   * 567*   BUN 23* 38*   CREATININE 2.8* 3.8*   CALCIUM 8.2* 7.9*   PROT 5.5* 5.2*   ALBUMIN 1.7* 1.7*   BILITOT 1.0 0.5   ALKPHOS 108 100   AST 16 10   ALT 13 11    ANIONGAP 10 12   EGFRNONAA 21* 15*         Significant Imaging: I have reviewed all pertinent imaging results/findings within the past 24 hours.      Assessment/Plan:      * Acute renal failure superimposed on chronic kidney disease  New HD  Follow renal panel and electrolytes closely.  Follow renal recommendations.  CT r/o renal stone  Adjust renal dose medications for creatinine clearance 10-50cc/min.  Avoid NSAIDs, De Leon-II inhibitors, ACE-I, Angiotensin Receptor Blockers, or Aminoglycosides.  Urine analysis.  Will defer following workup to nephro   JUAN A, C3, C4, ESR, UPEP and SPEP  Tunneled cath 7/12                  Hypertensive emergency  Off  Cardene drip  Started on amlodipine and isosorbide mononitrate  7/11 increased amlodipine dose    Anemia  Patient's anemia is currently controlled. Has recieved 2 units of PRBCs on 7/7. Etiology likely d/t Anemia of acute illness + Hemolytic anemia  Retic count elevated   Will cont to trend HnH   Current CBC reviewed-   Lab Results   Component Value Date    HGB 9.1 (L) 07/13/2022    HCT 25.5 (L) 07/13/2022     Monitor serial CBC and transfuse if patient becomes hemodynamically unstable, symptomatic or H/H drops below 7/21.   7/11 Transfuse 1 unit PRBC  On IV steroids per hematology      Pericardial effusion  ECHO shows · Moderate circumferential pericardial effusion.     The left ventricle is normal in size with moderate concentric hypertrophy and low normal systolic function.  · The estimated ejection fraction is 50%.  · Normal left ventricular diastolic function.  · Normal right ventricular size with normal right ventricular systolic function.          Seizure  Hx noted      Sickle cell trait  Hx noted        CKD (chronic kidney disease), stage IV  Hx noted      Anemia of chronic kidney failure  Hx noted  Ml 2/2  hypertensive emergency and acute illness   S/p  2 units   Patient's anemia is currently controlled. Has recieved 2 units of PRBCs on 7/7.   Current CBC  reviewed-   Lab Results   Component Value Date    HGB 9.1 (L) 07/13/2022    HCT 25.5 (L) 07/13/2022     Monitor serial CBC and transfuse if patient becomes hemodynamically unstable, symptomatic or H/H drops below 7/21.         Type II diabetes mellitus     Patient's FSGs are uncontrolled on current medication regimen. Hyperglycemic from steroids  Last A1c reviewed-   Lab Results   Component Value Date    HGBA1C 5.8 (H) 05/15/2022     Most recent fingerstick glucose reviewed-   Recent Labs   Lab 07/12/22  1147 07/12/22  1646 07/12/22  1937 07/13/22  0732   POCTGLUCOSE 180* 253* 277* >500*     Current correctional scale  Medium  Increase anti-hyperglycemic dose as follows-   Antihyperglycemics (From admission, onward)            Start     Stop Route Frequency Ordered    07/13/22 1115  insulin regular injection 10 Units 0.1 mL         -- SubQ Once 07/13/22 1010    07/13/22 0900  insulin detemir U-100 pen 8 Units         -- SubQ 2 times daily 07/13/22 0714    07/07/22 1719  insulin aspart U-100 pen 1-10 Units         -- SubQ Before meals & nightly PRN 07/07/22 1619        Hold Oral hypoglycemics while patient is in the hospital.          VTE Risk Mitigation (From admission, onward)         Ordered     heparin (porcine) injection 4,000 Units  As needed (PRN)         07/09/22 2022     IP VTE HIGH RISK PATIENT  Once         07/09/22 1957     Place JOCELYNE hose  Until discontinued         07/09/22 1957     heparin (porcine) injection 4,000 Units  Once         07/08/22 1624                Discharge Planning   TATIANA: 7/14/2022     Code Status: Prior   Is the patient medically ready for discharge?:     Reason for patient still in hospital (select all that apply): Patient trending condition and Treatment  Discharge Plan A: Home with family                  Linda Cueto MD  Department of Hospital Medicine   [facility]

## 2022-07-13 NOTE — ASSESSMENT & PLAN NOTE
Patient's anemia is currently controlled. Has recieved 2 units of PRBCs on 7/7. Etiology likely d/t Anemia of acute illness + Hemolytic anemia  Retic count elevated   Will cont to trend Hn   Current CBC reviewed-   Lab Results   Component Value Date    HGB 9.1 (L) 07/13/2022    HCT 25.5 (L) 07/13/2022     Monitor serial CBC and transfuse if patient becomes hemodynamically unstable, symptomatic or H/H drops below 7/21.   7/11 Transfuse 1 unit PRBC  On IV steroids per hematology

## 2022-07-13 NOTE — NURSING
Handoff shift report received from SHAILA Cervantes. Patient blood sugar 567 this AM. Unable to reach night shift provider. Patient given 10 units of aspart at 0719 per SHAILA Cervantes. Order acknowledged on chart for levemir 8 units bid entered by Dr. Cueto. POCT glucose obtained for this shift at blood sugar reading >500. Will continue to monitor blood sugar. Dr. Cueto made aware.

## 2022-07-13 NOTE — PROGRESS NOTES
"[facility]  Hematology/Oncology  Progress Note    Patient Name: Tabby Howard  Admission Date: 7/7/2022  Hospital Length of Stay: 6 days  Code Status: Prior     Subjective:     Interval History: blood sugars high, nursing reports restless night    32 y.o. female with a PMHx of DM II, supraventricular tachycardia, heart failure with preserved ejection fraction (04/12/22), Sickle cell trait, and CKD (stage IV) sent to the ED because of abnormal labs.   c/o shortness of breathness, feeling lethargic and generalized weakness for few weeks but has been feeling worse.   c/o diffuse back pain over the past month, intermittent chills as well as diarrhea a few days ago that has since improved.  Patient specifies she is able to urinate but notes that the volume has decreased.  Also states that her  leg swelling has been getting worse due to her being "full of fluid."  LMP was in December 2021.  per med rec.  Patient reports she had irregular menstrual cycles up until December.  She has been receiving intermittent blood transfusions  hospital since December     Oncology Consult:  We have been consulted for severe anemia.  On arrival she was noted to have a hemoglobin of 4.9.  She is now status post 2 units packed red blood cell.  Patient reports she seen a hematologist years ago and was told she had sickle cell trait.    Medications:  Continuous Infusions:   sodium chloride 0.9% 10 mL/hr at 07/12/22 1250     Scheduled Meds:   amLODIPine  10 mg Oral Daily    epoetin teresa-epbx  10,000 Units Intravenous Every Mon, Wed, Fri    famotidine  20 mg Oral Daily    heparin (porcine)  4,000 Units Intra-Catheter Once    insulin detemir U-100  8 Units Subcutaneous BID    isosorbide mononitrate  60 mg Oral Daily    levETIRAcetam  500 mg Oral BID    levETIRAcetam  500 mg Oral Every Mon, Wed, Fri    methylPREDNISolone sodium succinate injection  125 mg Intravenous TID     PRN Meds:sodium chloride, sodium " chloride, acetaminophen-codeine 300-30mg, dextrose 10%, dextrose 10%, dextrose 10%, glucagon (human recombinant), glucose, glucose, heparin (porcine), HYDROcodone-acetaminophen, insulin aspart U-100, labetaloL, melatonin, sodium chloride 0.9%     Review of Systems   Constitutional: Negative for fever.   HENT: Negative.    Eyes: Negative.    Respiratory: Positive for shortness of breath.    Cardiovascular: Negative.    Gastrointestinal: Negative.    Endocrine: Negative.    Genitourinary: Negative.    Musculoskeletal: Positive for back pain.   Skin: Negative.    Allergic/Immunologic: Negative.    Neurological: Negative.    Hematological: Negative.    Psychiatric/Behavioral: Negative  Objective:     Vital Signs (Most Recent):  Temp: 97.7 °F (36.5 °C) (07/13/22 0813)  Pulse: 74 (07/13/22 0813)  Resp: 17 (07/13/22 0813)  BP: (!) 140/72 (07/13/22 0813)  SpO2: 97 % (07/13/22 0813) Vital Signs (24h Range):  Temp:  [97.7 °F (36.5 °C)-99 °F (37.2 °C)] 97.7 °F (36.5 °C)  Pulse:  [] 74  Resp:  [0-20] 17  SpO2:  [92 %-98 %] 97 %  BP: (109-157)/() 140/72     Weight: 76.2 kg (167 lb 15.9 oz)  Body mass index is 30.73 kg/m².  Body surface area is 1.83 meters squared.      Intake/Output Summary (Last 24 hours) at 7/13/2022 1057  Last data filed at 7/13/2022 0840  Gross per 24 hour   Intake 2217.07 ml   Output 300 ml   Net 1917.07 ml       Physical Exam    GENERAL: Comfortable looking patient.  Tearful  Awake, alert and oriented to time, person and place.  No anxiety, or agitation.       HEENT: Normal conjunctivae and eyelids. WNL.  PERRLA 3 to 4 mm. No icterus, no pallor, no congestion, and no discharge noted.      NECK:  Supple. Trachea is central.  No crepitus.  No JVD or masses.     RESPIRATORY:  No intercostal retractions.  No dullness to percussion.  Chest is clear to auscultation.  No rales, rhonchi or wheezes.  No crepitus.  Good air entry bilaterally.     CARDIOVASCULAR:  S1 and S2 are normally heard without  murmurs or gallops.  All peripheral pulses are present.     ABDOMEN:  Normal abdomen.  No hepatosplenomegaly.  No free fluid.  Bowel sounds are present.  No hernia noted. No masses.  No rebound or tenderness.  No guarding or rigidity.  Umbilicus is midline.     LYMPHATICS:  No axillary, cervical, supraclavicular, submental, or inguinal lymphadenopathy.     SKIN/MUSCULOSKELETAL:  There is no evidence of excoriation marks or ecchmosis.  No rashes.  No cyanosis.  No clubbing.  No joint or skeletal deformities noted.  Normal range of motion. + edema noted in bilateral lower extremities     NEUROLOGIC:  Higher functions are appropriate.  No cranial nerve deficits.  Normal jatin.  Normal strength.  Motor and sensory functions are normal.  Deep tendon reflexes are normal.     GENITAL/RECTAL:  Exams are deferred  Significant Labs:   Lab Results   Component Value Date    WBC 4.96 07/13/2022    HGB 9.1 (L) 07/13/2022    HCT 25.5 (L) 07/13/2022    MCV 81 (L) 07/13/2022     (L) 07/13/2022     CMP  Sodium   Date Value Ref Range Status   07/13/2022 133 (L) 136 - 145 mmol/L Final     Potassium   Date Value Ref Range Status   07/13/2022 5.1 3.5 - 5.1 mmol/L Final     Chloride   Date Value Ref Range Status   07/13/2022 101 95 - 110 mmol/L Final     CO2   Date Value Ref Range Status   07/13/2022 20 (L) 23 - 29 mmol/L Final     Glucose   Date Value Ref Range Status   07/13/2022 567 (HH) 70 - 110 mg/dL Final     Comment:     glucose  critical result(s) called and verbal readback obtained from   Stephanie Maharaj by RUTH 07/13/2022 06:48       BUN   Date Value Ref Range Status   07/13/2022 38 (H) 6 - 20 mg/dL Final     Creatinine   Date Value Ref Range Status   07/13/2022 3.8 (H) 0.5 - 1.4 mg/dL Final     Calcium   Date Value Ref Range Status   07/13/2022 7.9 (L) 8.7 - 10.5 mg/dL Final     Total Protein   Date Value Ref Range Status   07/13/2022 5.2 (L) 6.0 - 8.4 g/dL Final     Albumin   Date Value Ref Range Status   07/13/2022 1.7  (L) 3.5 - 5.2 g/dL Final     Total Bilirubin   Date Value Ref Range Status   07/13/2022 0.5 0.1 - 1.0 mg/dL Final     Comment:     For infants and newborns, interpretation of results should be based  on gestational age, weight and in agreement with clinical  observations.    Premature Infant recommended reference ranges:  Up to 24 hours.............<8.0 mg/dL  Up to 48 hours............<12.0 mg/dL  3-5 days..................<15.0 mg/dL  6-29 days.................<15.0 mg/dL       Alkaline Phosphatase   Date Value Ref Range Status   07/13/2022 100 55 - 135 U/L Final     AST   Date Value Ref Range Status   07/13/2022 10 10 - 40 U/L Final     ALT   Date Value Ref Range Status   07/13/2022 11 10 - 44 U/L Final     Anion Gap   Date Value Ref Range Status   07/13/2022 12 8 - 16 mmol/L Final     eGFR if    Date Value Ref Range Status   07/13/2022 17 (A) >60 mL/min/1.73 m^2 Final     eGFR if non    Date Value Ref Range Status   07/13/2022 15 (A) >60 mL/min/1.73 m^2 Final     Comment:     Calculation used to obtain the estimated glomerular filtration  rate (eGFR) is the CKD-EPI equation.            Assessment/Plan:     Active Diagnoses:    Diagnosis Date Noted POA    PRINCIPAL PROBLEM:  Acute renal failure superimposed on chronic kidney disease [N17.9, N18.9] 07/07/2022 Yes    Hypertensive emergency [I16.1] 07/08/2022 Yes    Anemia [D64.9] 07/07/2022 Yes    Seizure [R56.9] 05/29/2022 Yes    CKD (chronic kidney disease), stage IV [N18.4] 04/12/2022 Yes    Sickle cell trait [D57.3] 04/12/2022 Yes    Pericardial effusion [I31.3] 03/07/2022 Yes    Anemia of chronic kidney failure [N18.9, D63.1] 04/24/2021 Yes    Type II diabetes mellitus [E11.9] 10/16/2019 Yes      Problems Resolved During this Admission:    Diagnosis Date Noted Date Resolved POA    Hyperkalemia [E87.5] 07/07/2022 07/08/2022 Yes     Anemia:  Concerning for hemolytic anemia post transfusion hemoglobin  increased to  8.5  then dwindling again 6.6 started  solumedrol 125 mg tid hemoglobin much better this morning patient transfused for hemoglobin of 6.6. cont steroids monitor daily cbc  hgb stable this am  MCV is normal.  Ferritin is over 3000. Haptoglobin less than 10 patient's total protein is also decreased .  G6PD vitamin B12 hemoglobin electrophoresis I pending  Jemal test is negative.  Total bilirubin is slightly elevated it was 2.1.  LDH is elevated at 446 and retic count is elevated be suggest hemolysis however patient has known sickle cell trait/anemia  Will continue to follow transfuse as needed wait for rest of workup to return    -transfuse 1 unit packed red blood cell for hemoglobin less than 7    dialysis on going   blood sugars high from steroids monitor and treat with insulin per hospitalist  Acute renal failure on chronic kidney disease:  Creatinine is improving dialyzed yesterday. meds adjusted for renal dosing Managed by Nephrology        Uncontrolled hypertension: better now, on medical floor transferred from ICU     Thank you for your consult.   Janessa Bland MD  Hematology/Oncology  [facility]

## 2022-07-13 NOTE — PROGRESS NOTES
"INPATIENT NEPHROLOGY PROGRESS  [facility]    Patient Name: Tabby Howard  Date: 2022    Reason for consultation: KANWAL    Chief Complaint:   Chief Complaint   Patient presents with    Abnormal Lab     History of Present Illness:  32 female with a PMHx of DM II, supraventricular tachycardia, heart failure with preserved ejection fraction (22), Sickle cell trait, and CKD (stage IV) who states that she came to the ED because of abnormal labs. She has been having shortness of breathness, feeling lethargic and generalized weakness for few weeks but has been feeling worse.  Patient had lab work done this morning and was informed of abnormal "kidney" results with referral to the ED for further evaluation and work up. She endorses diffuse back pain over the past month, intermittent chills as well as diarrhea a few days ago that has since improved. Patient specifies she is able to urinate but notes that the volume has decreased. Also states that her leg swelling has been getting worse due to her being "full of fluid."  LMP was in 2021. The patient denies fever, N/V, abdominal pain on exam, CP or any other symptoms at this time. PSHx includes EGD and . She was seen by our group during her last hospitalization and was scheduled for f/u with Dr. BATRES next week- previsit labs showed severe anemia and metabolic derangements so she was sent into the hospital.      JUAN A neg, UA with 3+ protein, 1+ blood  - UPCR 10.8    Admit CT AP:  Interval development of right pleural effusion.  Stable prominent pericardial effusion.  Interstitial prominence raising question of pulmonary edema.  Stable abdomen and pelvis with ascites, anasarca.    Interval History:  - hypertensive emergency, Hb 4, K 6.4, CO2 15 in the ER- spoke with ER physician and advised trialysis cath placement for emergent HD with blood transfusion- did HD overnight, started NTG gtt for BP control- switched to " nicardipine gtt overnight- got 2u of blood- [place] placed- oliguric, BP better, clearance parameters are better  7/9  Hgb improving, 7.8, post transfusion.  K+ at goal. Appreciate heme-onc input.  Pt fatigued, not conversing much.  No complaints.  Dialysis catheter to be replaced today, pt to have HD following.  7/10  Trialysis eval by gen surgery yesterday, line ok to continue to use--then pt pulled catheter out herself (per RN report), so new line was placed.  Had HD last night, 4L net fluid removal.  To have tunneled catheter placed tomorrow.  136cc UOP recorded.  Off cardene gtt, pressures stable thus far--improving w/fluid removal.    7/11 VSS. UO not great. HD today. Getting tunneled HD cath a well.  7/12 VSS, plan for tunneled cath by Dr. Gan. HD in AM. ? Blood transfusion - agree with Heme/Onc to transfuse if Hgb < 7, rather than more traditional goals. Will prefer to do so with HD.  7/13 VSS. Had tunneled HD cath place by Dr. Gan on 7/12. Seen on HD today. Additional 500mg Keppra After HD on HD days.    Plan of Care:    KANWAL/CKD V- likely now ESRD- history of uncontrolled DMII  Nephrotic syndrome with ansaraca; History of microscopic hematuria  HTN emergency/D CHF/Acut pulm edema/Pericardial effusion/Pleural effusion  Secondary HPT  Hemolytic anemia/Thrombocytopenia; likely component of Anemia of CKD as well  Dialysis dependent since 7/7    Plan:    - Initiated RRT on 7/7, tunneled cath placed 7/12. HD MWF for now. Anticipate discharge with outpatient dialysis with possible outpatient renal biopsy.  - Adjust oral BP medication and diuretics as needed. UF as tolerated. Renal diet with a 1.5L fluid restriction.   - Hgb is improved after 2u of blood- heme/onc input apprecaited. Plan to transfuse for Hgb <7.  - Additional 500mg Keppra After HD on HD days.    Thank you for allowing us to participate in this patient's care. We will continue to follow.    Vital Signs:  Temp Readings from Last 3 Encounters:    22 97.7 °F (36.5 °C)   22 98.2 °F (36.8 °C) (Oral)   22 98.1 °F (36.7 °C) (Axillary)       Pulse Readings from Last 3 Encounters:   22 74   22 100   22 110       BP Readings from Last 3 Encounters:   22 (!) 140/72   22 (!) 173/105   22 129/81       Weight:  Wt Readings from Last 3 Encounters:   07/10/22 76.2 kg (167 lb 15.9 oz)   22 73.9 kg (163 lb)   22 74.2 kg (163 lb 9.3 oz)       INS/OUTS:  I/O last 3 completed shifts:  In: 2357.1 [P.O.:1410; I.V.:447.1; Other:500]  Out: 4500 [Urine:1000; Other:3500]  I/O this shift:  In: 360 [P.O.:360]  Out: -     Past Medical & Surgical History:  Past Medical History:   Diagnosis Date    CKD (chronic kidney disease), stage IV 2022    Diabetes mellitus due to underlying condition with unspecified complications 2022    Gastroparesis 2022    Heart failure with preserved ejection fraction 2022    EF 55% on 3/22    History of gastroesophageal reflux (GERD)     History of supraventricular tachycardia     Hyperkalemia 2022    Sickle cell trait 2022    Type 2 diabetes mellitus        Past Surgical History:   Procedure Laterality Date     SECTION      x 3    ESOPHAGOGASTRODUODENOSCOPY N/A 10/18/2019    Procedure: ESOPHAGOGASTRODUODENOSCOPY (EGD);  Surgeon: Gianluca Mendez MD;  Location: [facility];  Service: Endoscopy;  Laterality: N/A;       Past Social History:  Social History     Socioeconomic History    Marital status:    Tobacco Use    Smoking status: Never Smoker    Smokeless tobacco: Never Used   Substance and Sexual Activity    Alcohol use: No    Drug use: No    Sexual activity: Not Currently     Partners: Male     Birth control/protection: I.U.D.       Medications:  Scheduled Meds:   amLODIPine  10 mg Oral Daily    epoetin teresa-epbx  10,000 Units Intravenous Every Mon, Wed, Fri    famotidine  20 mg Oral Daily    heparin (porcine)  4,000 Units  "Intra-Catheter Once    insulin detemir U-100  8 Units Subcutaneous BID    insulin regular  10 Units Subcutaneous Once    isosorbide mononitrate  60 mg Oral Daily    levETIRAcetam  500 mg Oral BID    methylPREDNISolone sodium succinate injection  125 mg Intravenous TID     Continuous Infusions:   sodium chloride 0.9% 10 mL/hr at 22 1250     PRN Meds:.sodium chloride, sodium chloride, acetaminophen-codeine 300-30mg, dextrose 10%, dextrose 10%, dextrose 10%, glucagon (human recombinant), glucose, glucose, heparin (porcine), HYDROcodone-acetaminophen, insulin aspart U-100, labetaloL, melatonin, sodium chloride 0.9%  No current facility-administered medications on file prior to encounter.     Current Outpatient Medications on File Prior to Encounter   Medication Sig Dispense Refill    FLUoxetine 20 MG capsule Take 1 capsule (20 mg total) by mouth once daily. 30 capsule 1    isosorbide mononitrate (IMDUR) 60 MG 24 hr tablet Take 1 tablet (60 mg total) by mouth once daily. 30 tablet 1    LANTUS U-100 INSULIN 100 unit/mL injection SMARTSI Unit(s) SUB-Q Every Morning      levETIRAcetam (KEPPRA) 500 MG Tab Take 1 tablet (500 mg total) by mouth 2 (two) times daily. 60 tablet 1    torsemide (DEMADEX) 20 MG Tab Take 1 tablet (20 mg total) by mouth 2 (two) times a day. (Patient taking differently: Take 20 mg by mouth once daily.) 60 tablet 1    [DISCONTINUED] furosemide (LASIX) 40 MG tablet Take 1 tablet (40 mg total) by mouth 2 (two) times daily. 60 tablet 0    [DISCONTINUED] pantoprazole (PROTONIX) 40 MG tablet Take 1 tablet (40 mg total) by mouth once daily. 30 tablet 3       Allergies:  Penicillins    Past Family History:  Reviewed; refer to Hospitalist Admission Note    Review of Systems:  Review of Systems - All 14 systems reviewed and negative, except as noted in HPI    Physical Exam:  BP (!) 140/72   Pulse 74   Temp 97.7 °F (36.5 °C)   Resp 17   Ht 5' 2" (1.575 m)   Wt 76.2 kg (167 lb 15.9 oz)  "  LMP 12/01/2021 (Approximate)   SpO2 97%   Breastfeeding No   BMI 30.73 kg/m²     General Appearance:    NAD, AAO x 3, cooperative, appears stated age   Head:    Normocephalic, atraumatic   Eyes:    PER, EOMI, and conjunctiva/sclera clear bilaterally        Mouth:   Moist mucus membranes   Back:     No CVA tenderness   Lungs:     Rales   Heart:    Regular rate and rhythm, S1 and S2 normal, no murmur, rub   or gallop   Abdomen:     Soft, non-tender, non-distended, bowel sounds active all four   quadrants, no RT or guarding, no masses, no organomegaly   Extremities:   Warm and well perfused, anasarca   MSK:   No joint or muscle swelling, tenderness or deformity   Skin:   Skin color, texture, turgor normal, no rashes or lesions   Neurologic/Psychiatric:   CNII-XII intact, normal strength and sensation       throughout, no asterixis; normal affect, memory, judgement     and insight     Results:  Recent Labs   Lab 07/11/22  0924 07/12/22  0555 07/13/22  0551    135* 133*   K 4.6 4.2 5.1    105 101   CO2 24 20* 20*   BUN 27* 23* 38*   CREATININE 3.5* 2.8* 3.8*   ESTGFRAFRICA 19* 25* 17*   EGFRNONAA 16* 21* 15*   * 248* 567*       Recent Labs   Lab 07/07/22  0853 07/07/22  1451 07/11/22  0924 07/12/22  0555 07/13/22  0551   CALCIUM 8.2*   < > 7.8* 8.2* 7.9*   ALBUMIN 2.1*   < > 1.4* 1.7* 1.7*   PHOS 4.4  --   --   --   --     < > = values in this interval not displayed.       Recent Labs   Lab 07/08/22  0516   PTH, Intact 289.8 H       Recent Labs   Lab 07/10/22  2025 07/11/22  0523 07/11/22  1238 07/11/22  1710 07/11/22  2211 07/12/22  0734 07/12/22  1147 07/12/22  1646 07/12/22  1937 07/13/22  0732   POCTGLUCOSE 158* 148* 141* 153* 126* 223* 180* 253* 277* >500*       Recent Labs   Lab 01/26/22  0240 03/06/22  1135 05/15/22  1954   Hemoglobin A1C 6.8 H 5.3 5.8 H       Recent Labs   Lab 07/11/22  0924 07/12/22  0555 07/13/22  0551   WBC 8.48 7.03 4.96   HGB 6.6* 9.6* 9.1*   HCT 19.1* 27.1* 25.5*    PLT 83* 114* 139*   MCV 84 82 81*   MCHC 34.6 35.4 35.7   MONO 7.8  0.7 2.0*  0.1* 3.6*  0.2*       Recent Labs   Lab 07/08/22  1517 07/11/22  0924 07/12/22  0555 07/13/22  0551   BILITOT 1.2* 0.8 1.0 0.5   BILIDIR 0.4*  --   --   --    PROT 5.1* 4.4* 5.5* 5.2*   ALBUMIN 2.0* 1.4* 1.7* 1.7*   ALKPHOS 86 83 108 100   ALT 15 11 13 11   AST 17 13 16 10       Recent Labs   Lab 05/28/22  2036 06/11/22  1115 07/07/22  0912   Color, UA Yellow Yellow Yellow   Appearance, UA Clear Clear Clear   pH, UA 7.0 7.0 6.0   Specific [place], UA 1.020 1.015 1.020   Protein, UA 3+ A 3+ A 3+ A   Glucose, UA 2+ A 2+ A Negative   Ketones, UA Negative Negative Negative   Urobilinogen, UA Negative Negative Negative   Bilirubin (UA) Negative Negative Negative   Occult Blood UA 2+ A 1+ A 2+ A   Nitrite, UA Negative Negative Negative   RBC, UA 5 H 3 3   WBC, UA 1 4 8 H   Bacteria None Occasional Rare   Hyaline Casts, UA 0 0 0       Recent Labs   Lab 12/13/20  1042 04/23/21  1952 04/23/21 2006 04/11/22  2043 04/11/22  2345 04/12/22  0211   POC PH 7.457 H 7.458 H  --  7.365  --   --    POC PCO2 32.6 L 32.9 L  --  39.5  --   --    POC HCO3 23.0 L 23.3 L  --  22.6 L  --   --    POC PO2 26 LL 24 LL  --  25 L  --   --    POC SATURATED O2 53 L 47 L  --  42 L  --   --    POC BE -1 -1  --  -3  --   --    Sample VENOUS VENOUS   < > VENOUS VENOUS VENOUS    < > = values in this interval not displayed.     I have spent > minutes providing care for this patient for the above diagnoses. These services have included chart/data/imaging review, evaluation, exam, formulation of plan, , note preparation, and discussions with staff involved in this patient's care.    Mando Benitez MD    [facility] [address] [address]  [phone] (p)  [phone] (f)

## 2022-07-13 NOTE — PLAN OF CARE
07/13/22 1848   Patient Assessment/Suction   Level of Consciousness (AVPU) alert   Respiratory Effort Normal;Unlabored   Expansion/Accessory Muscles/Retractions no retractions;no use of accessory muscles   Rhythm/Pattern, Respiratory depth regular;pattern regular;unlabored   Cough Frequency no cough   PRE-TX-O2   O2 Device (Oxygen Therapy) room air   SpO2 97 %   Pulse Oximetry Type Intermittent   $ Pulse Oximetry - Multiple Charge Pulse Oximetry - Multiple   Pulse 104   Resp 16

## 2022-07-13 NOTE — NURSING
Apolinar site with mild drainage noted light pink drainage Pressure applied with Sterile 4x4 and medpore tape Dr Gan notified no new orders given

## 2022-07-13 NOTE — NURSING
Pt last blood sugar after administering the 20 units of aspart was 310.   B/p unable to be rechecked d/t pt screaming on the phone to her kids.  Will notify night nurse of b/p's and to notify doctor Nory.  Will continue to monitor.

## 2022-07-13 NOTE — NURSING
Spoke with Dr. Cueto over the phone and she put in an order for regular insulin of 10 units for pt's blood sugar still being over 500 after receiving levemir 8 Units this am.   Pt given 10 units of regular insulin.  Will continue to monitor.   What Type Of Note Output Would You Prefer (Optional)?: Standard Output Hpi Title: Evaluation of Skin Lesions How Severe Are Your Spot(S)?: mild Have Your Spot(S) Been Treated In The Past?: has not been treated

## 2022-07-13 NOTE — NURSING
Pt's b/p rechecked of 180/107 with dynamap.  Took manual B/P of 181/90.  Will recheck B/P before shift change and if still high will notify MD Cueto for further orders.  Will continue to monitor.

## 2022-07-13 NOTE — PLAN OF CARE
The patient is alert and oriented X4. The dialysis catheter dressing  to the left chest is clean, dry and intact. No complaints of pain to the area. Her blood glucose level at HS was 277. The patient ate food throughout the night shift such as boxes of cereal, a turkey sandwich, 3 diet pudding, 2 milk and diet cranberry juice. The patient is sleeping at this time.

## 2022-07-13 NOTE — NURSING
Pt finsihed dialysis and nurse reported to staff nurse that pt had 3liters taken off. Last recorded b/p was 193/124 .  Pt's blood sugar was 361.  Contacted Dr. Cueto and changed order for insulin to aspart 10units with meals and increased levemir to 12 units.   Pt received aspart 10units and s/c coverage of 10 units aspart as well per md order.  Pt also was given morning b/p meds of amlodipine and isosorbide along with labetalol IV PRN per MD Cueto.  Will continue to monitor.

## 2022-07-14 LAB
ALBUMIN SERPL BCP-MCNC: 1.8 G/DL (ref 3.5–5.2)
ALP SERPL-CCNC: 101 U/L (ref 55–135)
ALT SERPL W/O P-5'-P-CCNC: 13 U/L (ref 10–44)
ANION GAP SERPL CALC-SCNC: 12 MMOL/L (ref 8–16)
AST SERPL-CCNC: 10 U/L (ref 10–40)
BASOPHILS # BLD AUTO: 0.01 K/UL (ref 0–0.2)
BASOPHILS NFR BLD: 0.1 % (ref 0–1.9)
BILIRUB SERPL-MCNC: 0.5 MG/DL (ref 0.1–1)
BUN SERPL-MCNC: 32 MG/DL (ref 6–20)
CALCIUM SERPL-MCNC: 8 MG/DL (ref 8.7–10.5)
CHLORIDE SERPL-SCNC: 99 MMOL/L (ref 95–110)
CO2 SERPL-SCNC: 22 MMOL/L (ref 23–29)
CREAT SERPL-MCNC: 3.2 MG/DL (ref 0.5–1.4)
DIFFERENTIAL METHOD: ABNORMAL
EOSINOPHIL # BLD AUTO: 0 K/UL (ref 0–0.5)
EOSINOPHIL NFR BLD: 0 % (ref 0–8)
ERYTHROCYTE [DISTWIDTH] IN BLOOD BY AUTOMATED COUNT: 13.6 % (ref 11.5–14.5)
EST. GFR  (AFRICAN AMERICAN): 21 ML/MIN/1.73 M^2
EST. GFR  (NON AFRICAN AMERICAN): 18 ML/MIN/1.73 M^2
GLUCOSE SERPL-MCNC: 361 MG/DL (ref 70–110)
HCT VFR BLD AUTO: 25.5 % (ref 37–48.5)
HGB BLD-MCNC: 9.2 G/DL (ref 12–16)
IMM GRANULOCYTES # BLD AUTO: 0.13 K/UL (ref 0–0.04)
IMM GRANULOCYTES NFR BLD AUTO: 1.8 % (ref 0–0.5)
INTERPRETATION SERPL IFE-IMP: NORMAL
LYMPHOCYTES # BLD AUTO: 0.5 K/UL (ref 1–4.8)
LYMPHOCYTES NFR BLD: 6.1 % (ref 18–48)
MCH RBC QN AUTO: 28.8 PG (ref 27–31)
MCHC RBC AUTO-ENTMCNC: 36.1 G/DL (ref 32–36)
MCV RBC AUTO: 80 FL (ref 82–98)
MONOCYTES # BLD AUTO: 0.4 K/UL (ref 0.3–1)
MONOCYTES NFR BLD: 5.7 % (ref 4–15)
NEUTROPHILS # BLD AUTO: 6.4 K/UL (ref 1.8–7.7)
NEUTROPHILS NFR BLD: 86.3 % (ref 38–73)
NRBC BLD-RTO: 0 /100 WBC
PLATELET # BLD AUTO: 160 K/UL (ref 150–450)
PMV BLD AUTO: 10.5 FL (ref 9.2–12.9)
POCT GLUCOSE: 262 MG/DL (ref 70–110)
POCT GLUCOSE: 296 MG/DL (ref 70–110)
POCT GLUCOSE: 344 MG/DL (ref 70–110)
POCT GLUCOSE: 397 MG/DL (ref 70–110)
POTASSIUM SERPL-SCNC: 4 MMOL/L (ref 3.5–5.1)
PROT SERPL-MCNC: 5.4 G/DL (ref 6–8.4)
RBC # BLD AUTO: 3.19 M/UL (ref 4–5.4)
SODIUM SERPL-SCNC: 133 MMOL/L (ref 136–145)
WBC # BLD AUTO: 7.38 K/UL (ref 3.9–12.7)

## 2022-07-14 PROCEDURE — 94761 N-INVAS EAR/PLS OXIMETRY MLT: CPT

## 2022-07-14 PROCEDURE — 63600175 PHARM REV CODE 636 W HCPCS: Performed by: INTERNAL MEDICINE

## 2022-07-14 PROCEDURE — 85025 COMPLETE CBC W/AUTO DIFF WBC: CPT | Performed by: HOSPITALIST

## 2022-07-14 PROCEDURE — 25000003 PHARM REV CODE 250: Performed by: INTERNAL MEDICINE

## 2022-07-14 PROCEDURE — 36415 COLL VENOUS BLD VENIPUNCTURE: CPT | Performed by: HOSPITALIST

## 2022-07-14 PROCEDURE — 80053 COMPREHEN METABOLIC PANEL: CPT | Performed by: HOSPITALIST

## 2022-07-14 PROCEDURE — 25000003 PHARM REV CODE 250: Performed by: HOSPITALIST

## 2022-07-14 PROCEDURE — 99233 SBSQ HOSP IP/OBS HIGH 50: CPT | Mod: ,,, | Performed by: INTERNAL MEDICINE

## 2022-07-14 PROCEDURE — 99233 PR SUBSEQUENT HOSPITAL CARE,LEVL III: ICD-10-PCS | Mod: ,,, | Performed by: INTERNAL MEDICINE

## 2022-07-14 PROCEDURE — 12000002 HC ACUTE/MED SURGE SEMI-PRIVATE ROOM

## 2022-07-14 RX ADMIN — INSULIN ASPART 8 UNITS: 100 INJECTION, SOLUTION INTRAVENOUS; SUBCUTANEOUS at 11:07

## 2022-07-14 RX ADMIN — INSULIN ASPART 10 UNITS: 100 INJECTION, SOLUTION INTRAVENOUS; SUBCUTANEOUS at 04:07

## 2022-07-14 RX ADMIN — INSULIN ASPART 10 UNITS: 100 INJECTION, SOLUTION INTRAVENOUS; SUBCUTANEOUS at 09:07

## 2022-07-14 RX ADMIN — INSULIN ASPART 6 UNITS: 100 INJECTION, SOLUTION INTRAVENOUS; SUBCUTANEOUS at 04:07

## 2022-07-14 RX ADMIN — LEVETIRACETAM 500 MG: 500 TABLET, FILM COATED ORAL at 09:07

## 2022-07-14 RX ADMIN — METHYLPREDNISOLONE SODIUM SUCCINATE 125 MG: 125 INJECTION, POWDER, FOR SOLUTION INTRAMUSCULAR; INTRAVENOUS at 04:07

## 2022-07-14 RX ADMIN — FAMOTIDINE 20 MG: 20 TABLET ORAL at 09:07

## 2022-07-14 RX ADMIN — INSULIN ASPART 3 UNITS: 100 INJECTION, SOLUTION INTRAVENOUS; SUBCUTANEOUS at 09:07

## 2022-07-14 RX ADMIN — INSULIN ASPART 10 UNITS: 100 INJECTION, SOLUTION INTRAVENOUS; SUBCUTANEOUS at 11:07

## 2022-07-14 RX ADMIN — METHYLPREDNISOLONE SODIUM SUCCINATE 125 MG: 125 INJECTION, POWDER, FOR SOLUTION INTRAMUSCULAR; INTRAVENOUS at 10:07

## 2022-07-14 RX ADMIN — AMLODIPINE BESYLATE 10 MG: 5 TABLET ORAL at 09:07

## 2022-07-14 RX ADMIN — ISOSORBIDE MONONITRATE 60 MG: 60 TABLET, EXTENDED RELEASE ORAL at 09:07

## 2022-07-14 NOTE — PROGRESS NOTES
"INPATIENT NEPHROLOGY PROGRESS  [facility]    Patient Name: Tabby Howard  Date: 2022    Reason for consultation: KANWAL    Chief Complaint:   Chief Complaint   Patient presents with    Abnormal Lab     History of Present Illness:  32 female with a PMHx of DM II, supraventricular tachycardia, heart failure with preserved ejection fraction (22), Sickle cell trait, and CKD (stage IV) who states that she came to the ED because of abnormal labs. She has been having shortness of breathness, feeling lethargic and generalized weakness for few weeks but has been feeling worse.  Patient had lab work done this morning and was informed of abnormal "kidney" results with referral to the ED for further evaluation and work up. She endorses diffuse back pain over the past month, intermittent chills as well as diarrhea a few days ago that has since improved. Patient specifies she is able to urinate but notes that the volume has decreased. Also states that her leg swelling has been getting worse due to her being "full of fluid."  LMP was in 2021. The patient denies fever, N/V, abdominal pain on exam, CP or any other symptoms at this time. PSHx includes EGD and . She was seen by our group during her last hospitalization and was scheduled for f/u with Dr. BATRES next week- previsit labs showed severe anemia and metabolic derangements so she was sent into the hospital.      JUAN A neg, UA with 3+ protein, 1+ blood  - UPCR 10.8    Admit CT AP:  Interval development of right pleural effusion.  Stable prominent pericardial effusion.  Interstitial prominence raising question of pulmonary edema.  Stable abdomen and pelvis with ascites, anasarca.    Interval History:  - hypertensive emergency, Hb 4, K 6.4, CO2 15 in the ER- spoke with ER physician and advised trialysis cath placement for emergent HD with blood transfusion- did HD overnight, started NTG gtt for BP control- switched to " nicardipine gtt overnight- got 2u of blood- [place] placed- oliguric, BP better, clearance parameters are better  7/9  Hgb improving, 7.8, post transfusion.  K+ at goal. Appreciate heme-onc input.  Pt fatigued, not conversing much.  No complaints.  Dialysis catheter to be replaced today, pt to have HD following.  7/10  Trialysis eval by gen surgery yesterday, line ok to continue to use--then pt pulled catheter out herself (per RN report), so new line was placed.  Had HD last night, 4L net fluid removal.  To have tunneled catheter placed tomorrow.  136cc UOP recorded.  Off cardene gtt, pressures stable thus far--improving w/fluid removal.    7/11 VSS. UO not great. HD today. Getting tunneled HD cath a well.  7/12 VSS, plan for tunneled cath by Dr. Gan. HD in AM. ? Blood transfusion - agree with Heme/Onc to transfuse if Hgb < 7, rather than more traditional goals. Will prefer to do so with HD.  7/13 VSS. Had tunneled HD cath place by Dr. Gan on 7/12. Seen on HD today. Additional 500mg Keppra After HD on HD days.  7/14 VSS, no new complains. HD in AM. BGs better, agree with steroid taper per Heme/Onc.    Plan of Care:    KANWAL/CKD V- likely now ESRD- history of uncontrolled DMII  Nephrotic syndrome with ansaraca; History of microscopic hematuria  HTN emergency/D CHF/Acut pulm edema/Pericardial effusion/Pleural effusion  Secondary HPT  Hemolytic anemia/Thrombocytopenia; likely component of Anemia of CKD as well  Dialysis dependent since 7/7    Plan:    - Initiated RRT on 7/7, tunneled cath placed 7/12. HD MWF for now. Anticipate discharge with outpatient dialysis with possible outpatient renal biopsy.  - Adjust oral BP medication and diuretics as needed. UF as tolerated. Renal diet with a 1.5L fluid restriction.   - Hgb is improved after 2u of blood- heme/onc input apprecaited. Plan to transfuse for Hgb < 7 and steroid taper (will help BGs too).  - Additional 500mg Keppra After HD on HD days.    Thank you for allowing  us to participate in this patient's care. We will continue to follow.    Vital Signs:  Temp Readings from Last 3 Encounters:   22 98.8 °F (37.1 °C) (Oral)   22 98.2 °F (36.8 °C) (Oral)   22 98.1 °F (36.7 °C) (Axillary)       Pulse Readings from Last 3 Encounters:   22 105   22 100   22 110       BP Readings from Last 3 Encounters:   22 (!) 172/106   22 (!) 173/105   22 129/81       Weight:  Wt Readings from Last 3 Encounters:   07/10/22 76.2 kg (167 lb 15.9 oz)   22 73.9 kg (163 lb)   22 74.2 kg (163 lb 9.3 oz)       INS/OUTS:  I/O last 3 completed shifts:  In: 2537.1 [P.O.:1600; I.V.:437.1; Other:500]  Out: 3700 [Urine:200; Other:3500]  No intake/output data recorded.    Past Medical & Surgical History:  Past Medical History:   Diagnosis Date    CKD (chronic kidney disease), stage IV 2022    Diabetes mellitus due to underlying condition with unspecified complications 2022    Gastroparesis 2022    Heart failure with preserved ejection fraction 2022    EF 55% on 3/22    History of gastroesophageal reflux (GERD)     History of supraventricular tachycardia     Hyperkalemia 2022    Sickle cell trait 2022    Type 2 diabetes mellitus        Past Surgical History:   Procedure Laterality Date     SECTION      x 3    ESOPHAGOGASTRODUODENOSCOPY N/A 10/18/2019    Procedure: ESOPHAGOGASTRODUODENOSCOPY (EGD);  Surgeon: Gianluca Mendez MD;  Location[facility];  Service: Endoscopy;  Laterality: N/A;    PLACEMENT OF DUAL-LUMEN VASCULAR CATHETER Left 2022    Procedure: INSERTION-CATHETER-JOSEPH;  Surgeon: Dionte Gan MD;  Location: [facility];  Service: General;  Laterality: Left;       Past Social History:  Social History     Socioeconomic History    Marital status:    Tobacco Use    Smoking status: Never Smoker    Smokeless tobacco: Never Used   Substance and Sexual Activity    Alcohol use: No     Drug use: No    Sexual activity: Not Currently     Partners: Male     Birth control/protection: I.U.D.       Medications:  Scheduled Meds:   amLODIPine  10 mg Oral Daily    epoetin teresa-epbx  10,000 Units Intravenous Every Mon, Wed, Fri    famotidine  20 mg Oral Daily    heparin (porcine)  4,000 Units Intra-Catheter Once    insulin aspart U-100  10 Units Subcutaneous TIDWM    insulin detemir U-100  15 Units Subcutaneous BID    isosorbide mononitrate  60 mg Oral Daily    levETIRAcetam  500 mg Oral BID    levETIRAcetam  500 mg Oral Every Mon, Wed, Fri    methylPREDNISolone sodium succinate injection  125 mg Intravenous TID     Continuous Infusions:   sodium chloride 0.9% 10 mL/hr at 22 1250     PRN Meds:.sodium chloride, sodium chloride, acetaminophen-codeine 300-30mg, dextrose 10%, dextrose 10%, dextrose 10%, glucagon (human recombinant), glucose, glucose, heparin (porcine), HYDROcodone-acetaminophen, insulin aspart U-100, labetaloL, melatonin, sodium chloride 0.9%  No current facility-administered medications on file prior to encounter.     Current Outpatient Medications on File Prior to Encounter   Medication Sig Dispense Refill    FLUoxetine 20 MG capsule Take 1 capsule (20 mg total) by mouth once daily. 30 capsule 1    isosorbide mononitrate (IMDUR) 60 MG 24 hr tablet Take 1 tablet (60 mg total) by mouth once daily. 30 tablet 1    LANTUS U-100 INSULIN 100 unit/mL injection SMARTSI Unit(s) SUB-Q Every Morning      levETIRAcetam (KEPPRA) 500 MG Tab Take 1 tablet (500 mg total) by mouth 2 (two) times daily. 60 tablet 1    torsemide (DEMADEX) 20 MG Tab Take 1 tablet (20 mg total) by mouth 2 (two) times a day. (Patient taking differently: Take 20 mg by mouth once daily.) 60 tablet 1    [DISCONTINUED] furosemide (LASIX) 40 MG tablet Take 1 tablet (40 mg total) by mouth 2 (two) times daily. 60 tablet 0    [DISCONTINUED] pantoprazole (PROTONIX) 40 MG tablet Take 1 tablet (40 mg total) by mouth  "once daily. 30 tablet 3       Allergies:  Penicillins    Past Family History:  Reviewed; refer to Hospitalist Admission Note    Review of Systems:  Review of Systems - All 14 systems reviewed and negative, except as noted in HPI    Physical Exam:  BP (!) 172/106 (BP Location: Right arm, Patient Position: Lying)   Pulse 105   Temp 98.8 °F (37.1 °C) (Oral)   Resp 19   Ht 5' 2" (1.575 m)   Wt 76.2 kg (167 lb 15.9 oz)   LMP 12/01/2021 (Approximate)   SpO2 96%   Breastfeeding No   BMI 30.73 kg/m²     General Appearance:    NAD, AAO x 3, cooperative, appears stated age   Head:    Normocephalic, atraumatic   Eyes:    PER, EOMI, and conjunctiva/sclera clear bilaterally        Mouth:   Moist mucus membranes   Back:     No CVA tenderness   Lungs:     Rales   Heart:    Regular rate and rhythm, S1 and S2 normal, no murmur, rub   or gallop   Abdomen:     Soft, non-tender, non-distended, bowel sounds active all four   quadrants, no RT or guarding, no masses, no organomegaly   Extremities:   Warm and well perfused, anasarca   MSK:   No joint or muscle swelling, tenderness or deformity   Skin:   Skin color, texture, turgor normal, no rashes or lesions   Neurologic/Psychiatric:   CNII-XII intact, normal strength and sensation       throughout, no asterixis; normal affect, memory, judgement     and insight     Results:  Recent Labs   Lab 07/12/22  0555 07/13/22  0551 07/14/22  0429   * 133* 133*   K 4.2 5.1 4.0    101 99   CO2 20* 20* 22*   BUN 23* 38* 32*   CREATININE 2.8* 3.8* 3.2*   ESTGFRAFRICA 25* 17* 21*   EGFRNONAA 21* 15* 18*   * 567* 361*       Recent Labs   Lab 07/12/22  0555 07/13/22  0551 07/14/22  0429   CALCIUM 8.2* 7.9* 8.0*   ALBUMIN 1.7* 1.7* 1.8*       Recent Labs   Lab 07/08/22  0516   PTH, Intact 289.8 H       Recent Labs   Lab 07/12/22  1937 07/13/22  0732 07/13/22  1001 07/13/22  1123 07/13/22  1253 07/13/22  1629 07/13/22  1851 07/13/22  2034 07/14/22  0714 07/14/22  1153 "   POCTGLUCOSE 277* >500* >500* >500* >500* 361* 310* 285* 397* 344*       Recent Labs   Lab 01/26/22  0240 03/06/22  1135 05/15/22  1954   Hemoglobin A1C 6.8 H 5.3 5.8 H       Recent Labs   Lab 07/12/22  0555 07/13/22  0551 07/14/22  0429   WBC 7.03 4.96 7.38   HGB 9.6* 9.1* 9.2*   HCT 27.1* 25.5* 25.5*   * 139* 160   MCV 82 81* 80*   MCHC 35.4 35.7 36.1*   MONO 2.0*  0.1* 3.6*  0.2* 5.7  0.4       Recent Labs   Lab 07/08/22  1517 07/11/22  0924 07/12/22  0555 07/13/22  0551 07/14/22  0429   BILITOT 1.2*   < > 1.0 0.5 0.5   BILIDIR 0.4*  --   --   --   --    PROT 5.1*   < > 5.5* 5.2* 5.4*   ALBUMIN 2.0*   < > 1.7* 1.7* 1.8*   ALKPHOS 86   < > 108 100 101   ALT 15   < > 13 11 13   AST 17   < > 16 10 10    < > = values in this interval not displayed.       Recent Labs   Lab 05/28/22 2036 06/11/22  1115 07/07/22  0912   Color, UA Yellow Yellow Yellow   Appearance, UA Clear Clear Clear   pH, UA 7.0 7.0 6.0   Specific [place], UA 1.020 1.015 1.020   Protein, UA 3+ A 3+ A 3+ A   Glucose, UA 2+ A 2+ A Negative   Ketones, UA Negative Negative Negative   Urobilinogen, UA Negative Negative Negative   Bilirubin (UA) Negative Negative Negative   Occult Blood UA 2+ A 1+ A 2+ A   Nitrite, UA Negative Negative Negative   RBC, UA 5 H 3 3   WBC, UA 1 4 8 H   Bacteria None Occasional Rare   Hyaline Casts, UA 0 0 0       Recent Labs   Lab 12/13/20  1042 04/23/21 1952 04/23/21 2006 04/11/22 2043 04/11/22  2345 04/12/22  0211   POC PH 7.457 H 7.458 H  --  7.365  --   --    POC PCO2 32.6 L 32.9 L  --  39.5  --   --    POC HCO3 23.0 L 23.3 L  --  22.6 L  --   --    POC PO2 26 LL 24 LL  --  25 L  --   --    POC SATURATED O2 53 L 47 L  --  42 L  --   --    POC BE -1 -1  --  -3  --   --    Sample VENOUS VENOUS   < > VENOUS VENOUS VENOUS    < > = values in this interval not displayed.     I have spent > minutes providing care for this patient for the above diagnoses. These services have included chart/data/imaging review,  evaluation, exam, formulation of plan, , note preparation, and discussions with staff involved in this patient's care.    Mando Benitez MD    [facility] [address] [address]  [phone] (p)  [phone] (f)

## 2022-07-14 NOTE — PLAN OF CARE
Intervention: nutrition education and sodium/phos/K modified diet     Recommendation:   1) Change diet to DM 1500 kcal, renal diet allow 2 dairy/day   2) weigh s/p HD   3) nutrition education and handout given     Goals: 1) PO intakes > 50% of meals and supplements at f/u  Nutrition Goal Status: new  Communication of RD Recs: reviewed with RN (POC, sticky note)

## 2022-07-14 NOTE — SUBJECTIVE & OBJECTIVE
Interval History: NAEO. HnH stable.  Hyperglycemic readjusted insulin    Review of Systems   Constitutional:  Positive for fatigue. Negative for chills.   HENT:  Negative for sore throat.    Respiratory:  Negative for cough and shortness of breath.    Gastrointestinal:  Negative for abdominal pain, nausea and vomiting.   Genitourinary:  Negative for decreased urine volume.   Musculoskeletal:  Negative for back pain and neck pain.   Skin:  Positive for pallor.   Neurological:  Positive for weakness. Negative for seizures and headaches.   Psychiatric/Behavioral:  Negative for behavioral problems.    Objective:     Vital Signs (Most Recent):  Temp: 98.3 °F (36.8 °C) (07/14/22 0723)  Pulse: 107 (07/14/22 0803)  Resp: 18 (07/14/22 0803)  BP: (!) 165/96 (07/14/22 0723)  SpO2: 95 % (07/14/22 0803)   Vital Signs (24h Range):  Temp:  [97.7 °F (36.5 °C)-99.7 °F (37.6 °C)] 98.3 °F (36.8 °C)  Pulse:  [102-118] 107  Resp:  [16-19] 18  SpO2:  [95 %-99 %] 95 %  BP: (160-193)/() 165/96     Weight: 76.2 kg (167 lb 15.9 oz)  Body mass index is 30.73 kg/m².    Intake/Output Summary (Last 24 hours) at 7/14/2022 0900  Last data filed at 7/13/2022 1605  Gross per 24 hour   Intake 740 ml   Output 3500 ml   Net -2760 ml      Physical Exam  Vitals and nursing note reviewed.   Constitutional:       General: She is not in acute distress.     Appearance: She is well-developed. She is ill-appearing. She is not diaphoretic.      Comments: Anasarca.   HENT:      Head: Normocephalic and atraumatic.      Right Ear: External ear normal.      Left Ear: External ear normal.      Nose: Nose normal.      Mouth/Throat:      Mouth: Mucous membranes are moist.   Eyes:      General: No scleral icterus.     Extraocular Movements: Extraocular movements intact.      Conjunctiva/sclera: Conjunctivae normal.      Pupils: Pupils are equal, round, and reactive to light.   Neck:      Vascular: No JVD.   Cardiovascular:      Rate and Rhythm: Normal rate and  regular rhythm.      Pulses: Normal pulses.      Heart sounds: Normal heart sounds. No murmur heard.    No friction rub. No gallop.   Pulmonary:      Effort: Pulmonary effort is normal. No respiratory distress.      Breath sounds: Normal breath sounds. No wheezing or rales.   Abdominal:      General: Bowel sounds are normal. There is no distension.      Palpations: Abdomen is soft.      Tenderness: There is no abdominal tenderness. There is no guarding or rebound.   Genitourinary:     Comments: Pelayo.  Musculoskeletal:         General: No tenderness. Normal range of motion.      Cervical back: Neck supple.      Right lower leg: Edema present.      Left lower leg: Edema present.   Skin:     General: Skin is warm and dry.      Coloration: Skin is not pale.      Findings: No erythema or rash.   Neurological:      Mental Status: She is oriented to person, place, and time.      Cranial Nerves: No cranial nerve deficit.      Motor: No weakness.   Psychiatric:         Mood and Affect: Mood normal.         Behavior: Behavior normal.       Significant Labs: All pertinent labs within the past 24 hours have been reviewed.  CBC:   Recent Labs   Lab 07/13/22  0551 07/14/22  0429   WBC 4.96 7.38   HGB 9.1* 9.2*   HCT 25.5* 25.5*   * 160     CMP:   Recent Labs   Lab 07/13/22  0551 07/14/22  0429   * 133*   K 5.1 4.0    99   CO2 20* 22*   * 361*   BUN 38* 32*   CREATININE 3.8* 3.2*   CALCIUM 7.9* 8.0*   PROT 5.2* 5.4*   ALBUMIN 1.7* 1.8*   BILITOT 0.5 0.5   ALKPHOS 100 101   AST 10 10   ALT 11 13   ANIONGAP 12 12   EGFRNONAA 15* 18*       Significant Imaging: I have reviewed all pertinent imaging results/findings within the past 24 hours.

## 2022-07-14 NOTE — PROGRESS NOTES
"HPI      Interval History: blood sugars high, nursing reports restless night     32 y.o. female with a PMHx of DM II, supraventricular tachycardia, heart failure with preserved ejection fraction (22), Sickle cell trait, and CKD (stage IV) sent to the ED because of abnormal labs.   c/o shortness of breathness, feeling lethargic and generalized weakness for few weeks but has been feeling worse.   c/o diffuse back pain over the past month, intermittent chills as well as diarrhea a few days ago that has since improved.  Patient specifies she is able to urinate but notes that the volume has decreased.  Also states that her  leg swelling has been getting worse due to her being "full of fluid."  LMP was in 2021.  per med rec.  Patient reports she had irregular menstrual cycles up until December.  She has been receiving intermittent blood transfusions  hospital since December     Oncology Consult:  We have been consulted for severe anemia.  On arrival she was noted to have a hemoglobin of 4.9.  She is now status post 2 units packed red blood cell.  Patient reports she seen a hematologist years ago and was told she had sickle cell trait.       Past Medical History:   Diagnosis Date    CKD (chronic kidney disease), stage IV 2022    Diabetes mellitus due to underlying condition with unspecified complications 2022    Gastroparesis 2022    Heart failure with preserved ejection fraction 2022    EF 55% on 3/22    History of gastroesophageal reflux (GERD)     History of supraventricular tachycardia     Hyperkalemia 2022    Sickle cell trait 2022    Type 2 diabetes mellitus      Past Surgical History:   Procedure Laterality Date     SECTION      x 3    ESOPHAGOGASTRODUODENOSCOPY N/A 10/18/2019    Procedure: ESOPHAGOGASTRODUODENOSCOPY (EGD);  Surgeon: Gianluca Mendez MD;  Location: [facility];  Service: Endoscopy;  Laterality: N/A;    PLACEMENT OF DUAL-LUMEN VASCULAR " CATHETER Left 7/12/2022    Procedure: INSERTION-CATHETER-JOSEPH;  Surgeon: Dionte Gan MD;  Location: [facility];  Service: General;  Laterality: Left;     Social History     Socioeconomic History    Marital status:    Tobacco Use    Smoking status: Never Smoker    Smokeless tobacco: Never Used   Substance and Sexual Activity    Alcohol use: No    Drug use: No    Sexual activity: Not Currently     Partners: Male     Birth control/protection: I.U.D.     Review of patient's allergies indicates:   Allergen Reactions    Penicillins Hives       Physical exam    Vitals:    07/14/22 0803   BP:    Pulse: 107   Resp: 18   Temp:      Right central catheter neck  Patient awake alert no acute distress laying comfortably in hospital bed   Pelayo  Bilateral lower extremity edema  Skin warm and dry  Normal mood  Normal respiratory effort  Tachycardic    Lab Results   Component Value Date    WBC 7.38 07/14/2022    HGB 9.2 (L) 07/14/2022    HCT 25.5 (L) 07/14/2022    MCV 80 (L) 07/14/2022     07/14/2022       CMP  Sodium   Date Value Ref Range Status   07/14/2022 133 (L) 136 - 145 mmol/L Final     Potassium   Date Value Ref Range Status   07/14/2022 4.0 3.5 - 5.1 mmol/L Final     Chloride   Date Value Ref Range Status   07/14/2022 99 95 - 110 mmol/L Final     CO2   Date Value Ref Range Status   07/14/2022 22 (L) 23 - 29 mmol/L Final     Glucose   Date Value Ref Range Status   07/14/2022 361 (H) 70 - 110 mg/dL Final     BUN   Date Value Ref Range Status   07/14/2022 32 (H) 6 - 20 mg/dL Final     Creatinine   Date Value Ref Range Status   07/14/2022 3.2 (H) 0.5 - 1.4 mg/dL Final     Calcium   Date Value Ref Range Status   07/14/2022 8.0 (L) 8.7 - 10.5 mg/dL Final     Total Protein   Date Value Ref Range Status   07/14/2022 5.4 (L) 6.0 - 8.4 g/dL Final     Albumin   Date Value Ref Range Status   07/14/2022 1.8 (L) 3.5 - 5.2 g/dL Final     Total Bilirubin   Date Value Ref Range Status   07/14/2022 0.5 0.1 - 1.0  mg/dL Final     Comment:     For infants and newborns, interpretation of results should be based  on gestational age, weight and in agreement with clinical  observations.    Premature Infant recommended reference ranges:  Up to 24 hours.............<8.0 mg/dL  Up to 48 hours............<12.0 mg/dL  3-5 days..................<15.0 mg/dL  6-29 days.................<15.0 mg/dL       Alkaline Phosphatase   Date Value Ref Range Status   07/14/2022 101 55 - 135 U/L Final     AST   Date Value Ref Range Status   07/14/2022 10 10 - 40 U/L Final     ALT   Date Value Ref Range Status   07/14/2022 13 10 - 44 U/L Final     Anion Gap   Date Value Ref Range Status   07/14/2022 12 8 - 16 mmol/L Final     eGFR if    Date Value Ref Range Status   07/14/2022 21 (A) >60 mL/min/1.73 m^2 Final     eGFR if non    Date Value Ref Range Status   07/14/2022 18 (A) >60 mL/min/1.73 m^2 Final     Comment:     Calculation used to obtain the estimated glomerular filtration  rate (eGFR) is the CKD-EPI equation.        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    Jemal Study is negative.    Chronic kidney disease on hemodialysis currently.    Thrombocytopenia resolved    Hypertension urgency better controlled    Elevated glucose level secondary to medication    Improvement in total bilirubin.    > transfuse if hemoglobin less than 7 grams/deciliter.  > anemia appear to be stable.  Continue steroids Solu-Medrol 125 mg but will need to tapering.  Starting tomorrow recommending 60 mg prednisone daily with monitor CBC, LDH.  > hematology will continue to follow

## 2022-07-14 NOTE — PLAN OF CARE
Plan of care reviewed with patient. ST on telemetry. HS bg 285, covered with ss insulin as per orders. RIJ Trialysis cath intact. L Chest tunnel cath intact. Remains free from falls/injury. Instructed to call for assistance as needed , verbalized understanding. Call light in reach.

## 2022-07-14 NOTE — PROGRESS NOTES
"[facility] Medicine  Progress Note    Patient Name: Tabby Howard  MRN: [id number]  Patient Class: IP- Inpatient   Admission Date: 2022  Length of Stay: 7 days  Attending Physician: Keegan Cody MD  Primary Care Provider: Primary Doctor No        Subjective:     Principal Problem:Acute renal failure superimposed on chronic kidney disease        HPI:  Tabby Howard is a 32 y.o. female with a PMHx of DM II, supraventricular tachycardia, heart failure with preserved ejection fraction (22), Sickle cell trait, and CKD (stage IV) who states that she came to the ED because of abnormal labs. She has been having shortness of breathness, feeling lethargic and generalized weakness for few weeks but has been feeling worse.  Patient had lab work done this morning and was informed of abnormal "kidney" results with referral to the ED for further evaluation and work up.  She endorses diffuse back pain over the past month, intermittent chills as well as diarrhea a few days ago that has since improved.  Patient specifies she is able to urinate but notes that the volume has decreased.  Also states that her  leg swelling has been getting worse due to her being "full of fluid."  LMP was in 2021.  The patient denies fever, N/V, abdominal pain on exam, CP or any other symptoms at this time.  PSHx includes EGD and .  Does not see a nephrologist for her CKD     The history is provided by the patient.          Overview/Hospital Course:  Patient was admitted for acute on chronic kidney disease with hyperkalemia and acidosis.  Patient was also found to be in hypertensive emergency.  On arrival patient was anemic Hb 4. Patient was transfused 2 units of blood with her new dialysis.  On arrival K 6.4, CO2 15. Nephrology was consulted patient was started on hemodialysis after dialysis access was placed.  For blood pressure control started NTG gtt and when blood pressure was not " controlled with nitro drip patient was switched to nicardipine gtt patient continued to be oliguric, hemoglobin improved after cut transition, hematology was consulted for further evaluation of anemia.  Workup showed hemolytic anemia which is attributed hemolysis during hypertensive emergency.  Patient's potassium improved acidosis improved after hemodialysis.  Tunneled catheter was placed 7/12. She was started on IV steroid by hematology, after which insulin had to be increased for hyperglycemia.       Interval History: NAEO. HnH stable.  Hyperglycemic readjusted insulin    Review of Systems   Constitutional:  Positive for fatigue. Negative for chills.   HENT:  Negative for sore throat.    Respiratory:  Negative for cough and shortness of breath.    Gastrointestinal:  Negative for abdominal pain, nausea and vomiting.   Genitourinary:  Negative for decreased urine volume.   Musculoskeletal:  Negative for back pain and neck pain.   Skin:  Positive for pallor.   Neurological:  Positive for weakness. Negative for seizures and headaches.   Psychiatric/Behavioral:  Negative for behavioral problems.    Objective:     Vital Signs (Most Recent):  Temp: 98.3 °F (36.8 °C) (07/14/22 0723)  Pulse: 107 (07/14/22 0803)  Resp: 18 (07/14/22 0803)  BP: (!) 165/96 (07/14/22 0723)  SpO2: 95 % (07/14/22 0803)   Vital Signs (24h Range):  Temp:  [97.7 °F (36.5 °C)-99.7 °F (37.6 °C)] 98.3 °F (36.8 °C)  Pulse:  [102-118] 107  Resp:  [16-19] 18  SpO2:  [95 %-99 %] 95 %  BP: (160-193)/() 165/96     Weight: 76.2 kg (167 lb 15.9 oz)  Body mass index is 30.73 kg/m².    Intake/Output Summary (Last 24 hours) at 7/14/2022 0900  Last data filed at 7/13/2022 1605  Gross per 24 hour   Intake 740 ml   Output 3500 ml   Net -2760 ml      Physical Exam  Vitals and nursing note reviewed.   Constitutional:       General: She is not in acute distress.     Appearance: She is well-developed. She is ill-appearing. She is not diaphoretic.      Comments:  Anasarca.   HENT:      Head: Normocephalic and atraumatic.      Right Ear: External ear normal.      Left Ear: External ear normal.      Nose: Nose normal.      Mouth/Throat:      Mouth: Mucous membranes are moist.   Eyes:      General: No scleral icterus.     Extraocular Movements: Extraocular movements intact.      Conjunctiva/sclera: Conjunctivae normal.      Pupils: Pupils are equal, round, and reactive to light.   Neck:      Vascular: No JVD.   Cardiovascular:      Rate and Rhythm: Normal rate and regular rhythm.      Pulses: Normal pulses.      Heart sounds: Normal heart sounds. No murmur heard.    No friction rub. No gallop.   Pulmonary:      Effort: Pulmonary effort is normal. No respiratory distress.      Breath sounds: Normal breath sounds. No wheezing or rales.   Abdominal:      General: Bowel sounds are normal. There is no distension.      Palpations: Abdomen is soft.      Tenderness: There is no abdominal tenderness. There is no guarding or rebound.   Genitourinary:     Comments: Pelayo.  Musculoskeletal:         General: No tenderness. Normal range of motion.      Cervical back: Neck supple.      Right lower leg: Edema present.      Left lower leg: Edema present.   Skin:     General: Skin is warm and dry.      Coloration: Skin is not pale.      Findings: No erythema or rash.   Neurological:      Mental Status: She is oriented to person, place, and time.      Cranial Nerves: No cranial nerve deficit.      Motor: No weakness.   Psychiatric:         Mood and Affect: Mood normal.         Behavior: Behavior normal.       Significant Labs: All pertinent labs within the past 24 hours have been reviewed.  CBC:   Recent Labs   Lab 07/13/22  0551 07/14/22  0429   WBC 4.96 7.38   HGB 9.1* 9.2*   HCT 25.5* 25.5*   * 160     CMP:   Recent Labs   Lab 07/13/22  0551 07/14/22  0429   * 133*   K 5.1 4.0    99   CO2 20* 22*   * 361*   BUN 38* 32*   CREATININE 3.8* 3.2*   CALCIUM 7.9* 8.0*   PROT  5.2* 5.4*   ALBUMIN 1.7* 1.8*   BILITOT 0.5 0.5   ALKPHOS 100 101   AST 10 10   ALT 11 13   ANIONGAP 12 12   EGFRNONAA 15* 18*       Significant Imaging: I have reviewed all pertinent imaging results/findings within the past 24 hours.      Assessment/Plan:      * Acute renal failure superimposed on chronic kidney disease  New HD  Follow renal panel and electrolytes closely.  Follow renal recommendations.  CT r/o renal stone  Adjust renal dose medications for creatinine clearance 10-50cc/min.  Avoid NSAIDs, [place] Leon-II inhibitors, ACE-I, Angiotensin Receptor Blockers, or Aminoglycosides.  Urine analysis.  Will defer following workup to nephro   JUAN A, C3, C4, ESR, UPEP and SPEP  Tunneled cath 7/12                  Hypertensive emergency  Off  Cardene drip  Started on amlodipine and isosorbide mononitrate  7/11 increased amlodipine dose    Anemia  Patient's anemia is currently controlled. Has recieved 2 units of PRBCs on 7/7. Etiology likely d/t Anemia of acute illness + Hemolytic anemia  Retic count elevated   Will cont to trend HnH   Current CBC reviewed-   Lab Results   Component Value Date    HGB 9.1 (L) 07/13/2022    HCT 25.5 (L) 07/13/2022     Monitor serial CBC and transfuse if patient becomes hemodynamically unstable, symptomatic or H/H drops below 7/21.   7/11 Transfuse 1 unit PRBC  On IV steroids per hematology      Pericardial effusion  ECHO shows · Moderate circumferential pericardial effusion.     The left ventricle is normal in size with moderate concentric hypertrophy and low normal systolic function.  · The estimated ejection fraction is 50%.  · Normal left ventricular diastolic function.  · Normal right ventricular size with normal right ventricular systolic function.          Seizure  Hx noted      Sickle cell trait  Hx noted        CKD (chronic kidney disease), stage IV  Hx noted      Anemia of chronic kidney failure  Hx noted  Ml 2/2  hypertensive emergency and acute illness   S/p  2 units   Patient's  anemia is currently controlled. Has recieved 2 units of PRBCs on 7/7.   Current CBC reviewed-   Lab Results   Component Value Date    HGB 9.1 (L) 07/13/2022    HCT 25.5 (L) 07/13/2022     Monitor serial CBC and transfuse if patient becomes hemodynamically unstable, symptomatic or H/H drops below 7/21.         Type II diabetes mellitus     Patient's FSGs are uncontrolled on current medication regimen. Hyperglycemic from steroids  Will readjust basal insulin  Last A1c reviewed-   Lab Results   Component Value Date    HGBA1C 5.8 (H) 05/15/2022     Most recent fingerstick glucose reviewed-   Recent Labs   Lab 07/13/22  1629 07/13/22  1851 07/13/22  2034 07/14/22  0714   POCTGLUCOSE 361* 310* 285* 397*     Current correctional scale  Medium  Increase anti-hyperglycemic dose as follows-   Antihyperglycemics (From admission, onward)            Start     Stop Route Frequency Ordered    07/13/22 2100  insulin detemir U-100 pen 12 Units         -- SubQ 2 times daily 07/13/22 1637    07/13/22 1645  insulin aspart U-100 pen 10 Units         -- SubQ 3 times daily with meals 07/13/22 1637    07/07/22 1719  insulin aspart U-100 pen 1-10 Units         -- SubQ Before meals & nightly PRN 07/07/22 1619        Hold Oral hypoglycemics while patient is in the hospital.          VTE Risk Mitigation (From admission, onward)         Ordered     heparin (porcine) injection 4,000 Units  As needed (PRN)         07/09/22 2022     IP VTE HIGH RISK PATIENT  Once         07/09/22 1957     Place JOCELYNE hose  Until discontinued         07/09/22 1957     heparin (porcine) injection 4,000 Units  Once         07/08/22 1624                Discharge Planning   TATIANA: 7/15/2022     Code Status: Prior   Is the patient medically ready for discharge?:     Reason for patient still in hospital (select all that apply): Patient trending condition and Treatment  Discharge Plan A: Home with family                  Keegan Cody MD  Department of Hospital Medicine    [facility]

## 2022-07-14 NOTE — ASSESSMENT & PLAN NOTE
Patient's FSGs are uncontrolled on current medication regimen. Hyperglycemic from steroids  Will readjust basal insulin  Last A1c reviewed-   Lab Results   Component Value Date    HGBA1C 5.8 (H) 05/15/2022     Most recent fingerstick glucose reviewed-   Recent Labs   Lab 07/13/22  1629 07/13/22  1851 07/13/22  2034 07/14/22  0714   POCTGLUCOSE 361* 310* 285* 397*     Current correctional scale  Medium  Increase anti-hyperglycemic dose as follows-   Antihyperglycemics (From admission, onward)            Start     Stop Route Frequency Ordered    07/13/22 2100  insulin detemir U-100 pen 12 Units         -- SubQ 2 times daily 07/13/22 1637    07/13/22 1645  insulin aspart U-100 pen 10 Units         -- SubQ 3 times daily with meals 07/13/22 1637    07/07/22 1719  insulin aspart U-100 pen 1-10 Units         -- SubQ Before meals & nightly PRN 07/07/22 1619        Hold Oral hypoglycemics while patient is in the hospital.

## 2022-07-14 NOTE — CONSULTS
[facility]  Adult Nutrition  Consult Note    SUMMARY    Intervention: nutrition education and sodium/phos/K modified diet    Recommendation:   1) Change diet to DM 1500 kcal, renal diet allow 2 dairy/day   2) weigh s/p HD   3) nutrition education and handout given    Goals: 1) PO intakes > 50% of meals and supplements at f/u  Nutrition Goal Status: new  Communication of RD Recs: reviewed with RN (POC, sticky note)    Assessment and Plan    Altered nutrition related lab values  R/t  food and nutrition related knowledge deficit , KANWAL  As evidenced by undesirable food choices, HD, elevated glucose  Intervention: above  new    Malnutrition Assessment     Skin (Micronutrient):  (WDL)  Teeth (Micronutrient):  (WDL)   Micronutrient Evaluation: suspected deficiency  Micronutrient Evaluation Comments: check iron                   Subcutaneous Fat Loss (Final Summary): well nourished  Muscle Loss Evaluation (Final Summary): well nourished         Reason for Assessment    Reason For Assessment: consult  Diagnosis:  (acute renal failure on CKD)  Relevant Medical History: senna, anemia, CKD IV, DM2  Interdisciplinary Rounds: did not attend    General Information Comments: 31 y/o female admitted with KANWAL on CKD, started on HD. Also with diabetes, A1C dropped from 11 to 5.3 in the last few years. Pt does not eat a well bealanced diet at home- ordering only hamburgers in patient, but says she is motivated to optimize her kidney function and not dirupt her HD sessions. I educated pt on renal diet and carb counting for diabetes- I gave her limits for Na, K, Phos and carbs and explained where added sugars are found ex: in the ketchup for her hamburgers. Pt verbalized understanding and is agreeable to plan. NFPE not donem, appears obese, well-nourished. No significant wt loss per chart review.    Nutrition Discharge Planning: To be determined- Renal, Dm 1500 kcal + fluid restriction per MD    Nutrition Risk  "Screen    Nutrition Risk Screen: no indicators present    Nutrition/Diet History    Patient Reported Diet/Restrictions/Preferences: general  Food Preferences: Asking for lots of 1% milk- I explained she can have max 2 dairy products/day  Spiritual, Cultural Beliefs, Orthodoxy Practices, Values that Affect Care: no  Food Allergies: NKFA  Factors Affecting Nutritional Intake: None identified at this time    Anthropometrics    Temp: 98.8 °F (37.1 °C)  Height Method: Measured  Height: 5' 2"  Height (inches): 62 in  Weight Method: Bed Scale  Weight: 76.2 kg (167 lb 15.9 oz)  Weight (lb): 167.99 lb  Ideal Body Weight (IBW), Female: 110 lb  % Ideal Body Weight, Female (lb): 164.55 %  BMI (Calculated): 30.7  BMI Grade: 30 - 34.9- obesity - grade I       Lab/Procedures/Meds    Pertinent Labs Reviewed: reviewed  BMP  Lab Results   Component Value Date     (L) 07/14/2022    K 4.0 07/14/2022    CL 99 07/14/2022    CO2 22 (L) 07/14/2022    BUN 32 (H) 07/14/2022    CREATININE 3.2 (H) 07/14/2022    CALCIUM 8.0 (L) 07/14/2022    ANIONGAP 12 07/14/2022    ESTGFRAFRICA 21 (A) 07/14/2022    EGFRNONAA 18 (A) 07/14/2022     Lab Results   Component Value Date    CALCIUM 8.0 (L) 07/14/2022    PHOS 4.4 07/07/2022     Recent Labs   Lab 07/14/22  1153   POCTGLUCOSE 344*     Lab Results   Component Value Date    HGBA1C 5.8 (H) 05/15/2022       Pertinent Medications Reviewed: reviewed  Scheduled Meds:   amLODIPine  10 mg Oral Daily    epoetin teresa-epbx  10,000 Units Intravenous Every Mon, Wed, Fri    famotidine  20 mg Oral Daily    heparin (porcine)  4,000 Units Intra-Catheter Once    insulin aspart U-100  10 Units Subcutaneous TIDWM    insulin detemir U-100  15 Units Subcutaneous BID    isosorbide mononitrate  60 mg Oral Daily    levETIRAcetam  500 mg Oral BID    levETIRAcetam  500 mg Oral Every Mon, Wed, Fri    methylPREDNISolone sodium succinate injection  125 mg Intravenous TID     Continuous Infusions:   sodium chloride " 0.9% 10 mL/hr at 07/12/22 1250     PRN Meds:.sodium chloride, sodium chloride, acetaminophen-codeine 300-30mg, dextrose 10%, dextrose 10%, dextrose 10%, glucagon (human recombinant), glucose, glucose, heparin (porcine), HYDROcodone-acetaminophen, insulin aspart U-100, labetaloL, melatonin, sodium chloride 0.9%      Estimated/Assessed Needs    Weight Used For Calorie Calculations: 76.2 kg (167 lb 15.9 oz)  Energy Calorie Requirements (kcal): MSJ ( no AF obesity) = 1420 kcal  Energy Need Method: [place]-St Jeor  Protein Requirements: 1.2 g protein/kg HD = 73 g  Weight Used For Protein Calculations: 61 kg (134 lb 7.7 oz)  Fluid Requirements (mL): 1500 ml or per MD ( HD)  Estimated Fluid Requirement Method: RDA Method  CHO Requirement: 159-195 g      Nutrition Prescription Ordered    Current Diet Order: renal    Evaluation of Received Nutrient/Fluid Intake    Energy Calories Required: meeting needs  Protein Required: meeting needs  Fluid Required: meeting needs ( per pt and RN- not reflected in flowsheets)  Tolerance: tolerating     Intake/Output Summary (Last 24 hours) at 7/14/2022 1430  Last data filed at 7/13/2022 1605  Gross per 24 hour   Intake 500 ml   Output 3500 ml   Net -3000 ml       % Intake of Estimated Energy Needs: %  % Meal Intake: %    Nutrition Risk    Level of Risk/Frequency of Follow-up: low - moderate 1 x weekly      Monitor and Evaluation    Food and Nutrient Intake: energy intake, food and beverage intake  Food and Nutrient Adminstration: diet order  Knowledge/Beliefs/Attitudes: food and nutrition knowledge/skill  Anthropometric Measurements: weight  Biochemical Data, Medical Tests and Procedures: electrolyte and renal panel, glucose/endocrine profile  Nutrition-Focused Physical Findings: overall appearance       Nutrition Follow-Up    RD Follow-up?: Yes

## 2022-07-15 LAB
ALBUMIN SERPL BCP-MCNC: 1.8 G/DL (ref 3.5–5.2)
ALP SERPL-CCNC: 93 U/L (ref 55–135)
ALT SERPL W/O P-5'-P-CCNC: 11 U/L (ref 10–44)
ANION GAP SERPL CALC-SCNC: 10 MMOL/L (ref 8–16)
AST SERPL-CCNC: 10 U/L (ref 10–40)
BASOPHILS # BLD AUTO: 0.01 K/UL (ref 0–0.2)
BASOPHILS NFR BLD: 0.1 % (ref 0–1.9)
BILIRUB SERPL-MCNC: 0.5 MG/DL (ref 0.1–1)
BUN SERPL-MCNC: 51 MG/DL (ref 6–20)
CALCIUM SERPL-MCNC: 8 MG/DL (ref 8.7–10.5)
CHLORIDE SERPL-SCNC: 100 MMOL/L (ref 95–110)
CO2 SERPL-SCNC: 22 MMOL/L (ref 23–29)
CREAT SERPL-MCNC: 3.7 MG/DL (ref 0.5–1.4)
DIFFERENTIAL METHOD: ABNORMAL
EOSINOPHIL # BLD AUTO: 0 K/UL (ref 0–0.5)
EOSINOPHIL NFR BLD: 0 % (ref 0–8)
ERYTHROCYTE [DISTWIDTH] IN BLOOD BY AUTOMATED COUNT: 13.7 % (ref 11.5–14.5)
EST. GFR  (AFRICAN AMERICAN): 18 ML/MIN/1.73 M^2
EST. GFR  (NON AFRICAN AMERICAN): 15 ML/MIN/1.73 M^2
GLUCOSE SERPL-MCNC: 240 MG/DL (ref 70–110)
HCT VFR BLD AUTO: 28.3 % (ref 37–48.5)
HGB BLD-MCNC: 10 G/DL (ref 12–16)
IMM GRANULOCYTES # BLD AUTO: 0.16 K/UL (ref 0–0.04)
IMM GRANULOCYTES NFR BLD AUTO: 1.9 % (ref 0–0.5)
LYMPHOCYTES # BLD AUTO: 0.6 K/UL (ref 1–4.8)
LYMPHOCYTES NFR BLD: 6.8 % (ref 18–48)
MCH RBC QN AUTO: 28.5 PG (ref 27–31)
MCHC RBC AUTO-ENTMCNC: 35.3 G/DL (ref 32–36)
MCV RBC AUTO: 81 FL (ref 82–98)
MONOCYTES # BLD AUTO: 0.3 K/UL (ref 0.3–1)
MONOCYTES NFR BLD: 3.9 % (ref 4–15)
NEUTROPHILS # BLD AUTO: 7.2 K/UL (ref 1.8–7.7)
NEUTROPHILS NFR BLD: 87.3 % (ref 38–73)
NRBC BLD-RTO: 0 /100 WBC
PATHOLOGIST INTERPRETATION IFE: NORMAL
PLATELET # BLD AUTO: 169 K/UL (ref 150–450)
PMV BLD AUTO: 9.8 FL (ref 9.2–12.9)
POCT GLUCOSE: 160 MG/DL (ref 70–110)
POCT GLUCOSE: 216 MG/DL (ref 70–110)
POCT GLUCOSE: 222 MG/DL (ref 70–110)
POCT GLUCOSE: 262 MG/DL (ref 70–110)
POTASSIUM SERPL-SCNC: 4.2 MMOL/L (ref 3.5–5.1)
PROT SERPL-MCNC: 5.1 G/DL (ref 6–8.4)
RBC # BLD AUTO: 3.51 M/UL (ref 4–5.4)
SODIUM SERPL-SCNC: 132 MMOL/L (ref 136–145)
WBC # BLD AUTO: 8.29 K/UL (ref 3.9–12.7)

## 2022-07-15 PROCEDURE — 25000003 PHARM REV CODE 250: Performed by: INTERNAL MEDICINE

## 2022-07-15 PROCEDURE — 63600175 PHARM REV CODE 636 W HCPCS: Performed by: HOSPITALIST

## 2022-07-15 PROCEDURE — 25000003 PHARM REV CODE 250: Performed by: HOSPITALIST

## 2022-07-15 PROCEDURE — 63600175 PHARM REV CODE 636 W HCPCS: Performed by: INTERNAL MEDICINE

## 2022-07-15 PROCEDURE — 12000002 HC ACUTE/MED SURGE SEMI-PRIVATE ROOM

## 2022-07-15 PROCEDURE — 36415 COLL VENOUS BLD VENIPUNCTURE: CPT | Performed by: HOSPITALIST

## 2022-07-15 PROCEDURE — 85025 COMPLETE CBC W/AUTO DIFF WBC: CPT | Performed by: HOSPITALIST

## 2022-07-15 PROCEDURE — 80053 COMPREHEN METABOLIC PANEL: CPT | Performed by: HOSPITALIST

## 2022-07-15 PROCEDURE — 80100014 HC HEMODIALYSIS 1:1

## 2022-07-15 PROCEDURE — 94761 N-INVAS EAR/PLS OXIMETRY MLT: CPT

## 2022-07-15 RX ORDER — PREDNISONE 20 MG/1
60 TABLET ORAL DAILY
Status: DISCONTINUED | OUTPATIENT
Start: 2022-07-15 | End: 2022-07-17 | Stop reason: HOSPADM

## 2022-07-15 RX ORDER — PREDNISONE 20 MG/1
TABLET ORAL
Qty: 26 TABLET | Refills: 0 | Status: ON HOLD | OUTPATIENT
Start: 2022-07-15 | End: 2022-08-02 | Stop reason: HOSPADM

## 2022-07-15 RX ORDER — SODIUM CHLORIDE 9 MG/ML
INJECTION, SOLUTION INTRAVENOUS ONCE
Status: DISCONTINUED | OUTPATIENT
Start: 2022-07-15 | End: 2022-07-16

## 2022-07-15 RX ORDER — INSULIN ASPART 100 [IU]/ML
1-10 INJECTION, SOLUTION INTRAVENOUS; SUBCUTANEOUS
Qty: 3 ML | Refills: 3 | Status: ON HOLD | OUTPATIENT
Start: 2022-07-15 | End: 2022-11-15 | Stop reason: SDUPTHER

## 2022-07-15 RX ORDER — AMLODIPINE BESYLATE 10 MG/1
10 TABLET ORAL DAILY
Qty: 30 TABLET | Refills: 11 | Status: ON HOLD | OUTPATIENT
Start: 2022-07-15 | End: 2022-11-15 | Stop reason: SDUPTHER

## 2022-07-15 RX ORDER — HYDROCODONE BITARTRATE AND ACETAMINOPHEN 5; 325 MG/1; MG/1
1 TABLET ORAL EVERY 6 HOURS PRN
Qty: 20 TABLET | Refills: 0 | Status: ON HOLD | OUTPATIENT
Start: 2022-07-15 | End: 2022-08-05 | Stop reason: HOSPADM

## 2022-07-15 RX ORDER — FAMOTIDINE 20 MG/1
20 TABLET, FILM COATED ORAL DAILY
Qty: 30 TABLET | Refills: 0 | Status: ON HOLD | OUTPATIENT
Start: 2022-07-16 | End: 2022-11-07 | Stop reason: HOSPADM

## 2022-07-15 RX ORDER — ISOSORBIDE MONONITRATE 60 MG/1
120 TABLET, EXTENDED RELEASE ORAL DAILY
Qty: 30 TABLET | Refills: 1 | Status: ON HOLD | OUTPATIENT
Start: 2022-07-15 | End: 2022-11-15 | Stop reason: SDUPTHER

## 2022-07-15 RX ADMIN — EPOETIN ALFA-EPBX 10000 UNITS: 10000 INJECTION, SOLUTION INTRAVENOUS; SUBCUTANEOUS at 12:07

## 2022-07-15 RX ADMIN — HEPARIN SODIUM 4000 UNITS: 1000 INJECTION INTRAVENOUS; SUBCUTANEOUS at 12:07

## 2022-07-15 RX ADMIN — INSULIN ASPART 10 UNITS: 100 INJECTION, SOLUTION INTRAVENOUS; SUBCUTANEOUS at 12:07

## 2022-07-15 RX ADMIN — ISOSORBIDE MONONITRATE 60 MG: 60 TABLET, EXTENDED RELEASE ORAL at 12:07

## 2022-07-15 RX ADMIN — INSULIN ASPART 3 UNITS: 100 INJECTION, SOLUTION INTRAVENOUS; SUBCUTANEOUS at 09:07

## 2022-07-15 RX ADMIN — AMLODIPINE BESYLATE 10 MG: 5 TABLET ORAL at 05:07

## 2022-07-15 RX ADMIN — LEVETIRACETAM 500 MG: 500 TABLET, FILM COATED ORAL at 09:07

## 2022-07-15 RX ADMIN — ALTEPLASE 4 MG: 2.2 INJECTION, POWDER, LYOPHILIZED, FOR SOLUTION INTRAVENOUS at 12:07

## 2022-07-15 RX ADMIN — HYDROCODONE BITARTRATE AND ACETAMINOPHEN 1 TABLET: 5; 325 TABLET ORAL at 12:07

## 2022-07-15 RX ADMIN — INSULIN ASPART 10 UNITS: 100 INJECTION, SOLUTION INTRAVENOUS; SUBCUTANEOUS at 05:07

## 2022-07-15 RX ADMIN — INSULIN ASPART 4 UNITS: 100 INJECTION, SOLUTION INTRAVENOUS; SUBCUTANEOUS at 05:07

## 2022-07-15 RX ADMIN — LEVETIRACETAM 500 MG: 500 TABLET, FILM COATED ORAL at 12:07

## 2022-07-15 RX ADMIN — INSULIN ASPART 10 UNITS: 100 INJECTION, SOLUTION INTRAVENOUS; SUBCUTANEOUS at 08:07

## 2022-07-15 RX ADMIN — METHYLPREDNISOLONE SODIUM SUCCINATE 125 MG: 125 INJECTION, POWDER, FOR SOLUTION INTRAMUSCULAR; INTRAVENOUS at 08:07

## 2022-07-15 RX ADMIN — FAMOTIDINE 20 MG: 20 TABLET ORAL at 08:07

## 2022-07-15 RX ADMIN — LEVETIRACETAM 500 MG: 500 TABLET, FILM COATED ORAL at 08:07

## 2022-07-15 NOTE — PROGRESS NOTES
2500 ml net uf removed,  Cath natalee 2 mg instilled in each port of tunnel cath, cath. Wrapped and labeled   07/15/22 1248   Handoff Report   Received From Dahlia Sosa   Vital Signs   Temp 97.1 °F (36.2 °C)   Pulse 101   Resp 18   BP (!) 151/100   Assessments (Pre/Post)   Blood Liters Processed (BLP) 51.8   Transport Modality not applicable   Level of Consciousness (AVPU) alert   Dialyzer Clearance mildly streaked   Pain   Preferred Pain Scale number (Numeric Rating Pain Scale)   Pain Rating (0-10): Rest 0   Tunneled Central Line Insertion/Assessment - Double Lumen  07/12/22 1441 left internal jugular   Placement Date/Time: 07/12/22 1441   Present Prior to Hospital Arrival?: No  Inserted by: MD  Hand Hygiene: Performed  Barrier Precautions: Performed  Skin Antisepsis: ChloraPrep  Location: left internal jugular  : Given Goods  Lot Number: [id number]   Distal Patency/Care catheter declotted per protocol   Proximal 1 Patency/Care catheter declotted per protocol   Trialysis (Dialysis) Catheter 07/09/22 1900 right internal jugular   Placement Date/Time: 07/09/22 1900   Present Prior to Hospital Arrival?: No  Hand Hygiene: Performed  Barrier Precautions: Performed  Skin Antisepsis: chlorhexidine (without alcohol)  Location: right internal jugular  Pressure Injectable Catheter: Yes...   Site Assessment No drainage;No redness;No swelling;No warmth   Line Securement Device Secured with sutures   Dressing Type Central line dressing   Dressing Status Clean;Dry;Intact   Dressing Intervention Integrity maintained   Date on Dressing 07/10/22   Dressing Due to be Changed 07/17/22   Venous Patency/Care heparin locked   Arterial Patency/Care heparin locked   Flows Good   Line Necessity Review CRRT/HD   Post-Hemodialysis Assessment   Rinseback Volume (mL) 250 mL   Blood Volume Processed (Liters) 51.8 L   Dialyzer Clearance Lightly streaked   Additional Fluid Intake (mL) 500 mL   Total UF (mL) 3000 mL   Net Fluid Removal  2500   Patient Response to Treatment tolerated well   Post-Treatment Weight 73.8 kg (162 lb 11.2 oz)   Treatment Weight Change -2.4   Post-Hemodialysis Comments tx completed   Edema   Edema generalized

## 2022-07-15 NOTE — PLAN OF CARE
Pt progressing.  Dialysis today.  Hemosplit clotted.  Cathflo instilled and to stay over night.  If hemosplit functioning 7/16/22, will dialyze and ok for discharge.  Pressure still elevated, improved after dialysis.  Safety maintained.   Discharged

## 2022-07-15 NOTE — NURSING
Per dialysis nurse, cathflo instilled into both ports of hemosplit and is to stay in over night.  Both ports marks and wrapped.  Will return tomorrow to remove and attempt to remove 2L, if successful, ok to d/c.

## 2022-07-15 NOTE — PROGRESS NOTES
"INPATIENT NEPHROLOGY PROGRESS  [facility]    Patient Name: Tabby Howard  Date: 07/15/2022    Reason for consultation: KANWAL    Chief Complaint:   Chief Complaint   Patient presents with    Abnormal Lab     History of Present Illness:  32 female with a PMHx of DM II, supraventricular tachycardia, heart failure with preserved ejection fraction (22), Sickle cell trait, and CKD (stage IV) who states that she came to the ED because of abnormal labs. She has been having shortness of breathness, feeling lethargic and generalized weakness for few weeks but has been feeling worse.  Patient had lab work done this morning and was informed of abnormal "kidney" results with referral to the ED for further evaluation and work up. She endorses diffuse back pain over the past month, intermittent chills as well as diarrhea a few days ago that has since improved. Patient specifies she is able to urinate but notes that the volume has decreased. Also states that her leg swelling has been getting worse due to her being "full of fluid."  LMP was in 2021. The patient denies fever, N/V, abdominal pain on exam, CP or any other symptoms at this time. PSHx includes EGD and . She was seen by our group during her last hospitalization and was scheduled for f/u with Dr. BATRES next week- previsit labs showed severe anemia and metabolic derangements so she was sent into the hospital.      JUAN A neg, UA with 3+ protein, 1+ blood  - UPCR 10.8    Admit CT AP:  Interval development of right pleural effusion.  Stable prominent pericardial effusion.  Interstitial prominence raising question of pulmonary edema.  Stable abdomen and pelvis with ascites, anasarca.    Interval History:  - hypertensive emergency, Hb 4, K 6.4, CO2 15 in the ER- spoke with ER physician and advised trialysis cath placement for emergent HD with blood transfusion- did HD overnight, started NTG gtt for BP control- switched to " nicardipine gtt overnight- got 2u of blood- [place] placed- oliguric, BP better, clearance parameters are better  7/9  Hgb improving, 7.8, post transfusion.  K+ at goal. Appreciate heme-onc input.  Pt fatigued, not conversing much.  No complaints.  Dialysis catheter to be replaced today, pt to have HD following.  7/10  Trialysis eval by gen surgery yesterday, line ok to continue to use--then pt pulled catheter out herself (per RN report), so new line was placed.  Had HD last night, 4L net fluid removal.  To have tunneled catheter placed tomorrow.  136cc UOP recorded.  Off cardene gtt, pressures stable thus far--improving w/fluid removal.    7/11 VSS. UO not great. HD today. Getting tunneled HD cath a well.  7/12 VSS, plan for tunneled cath by Dr. Gan. HD in AM. ? Blood transfusion - agree with Heme/Onc to transfuse if Hgb < 7, rather than more traditional goals. Will prefer to do so with HD.  7/13 VSS. Had tunneled HD cath place by Dr. Gan on 7/12. Seen on HD today. Additional 500mg Keppra After HD on HD days.  7/14 VSS, no new complains. HD in AM. BGs better, agree with steroid taper per Heme/Onc.  7/15 VSS. Tunneled cath not working - never HD HD since placment. Had to do HD via Apolinar. Doing cathflo and will attempt HD in AM. If not working, will need revision by surgery before discharge. Keep her here for now. Keep Apolinar line in place.    Plan of Care:    KANWAL/CKD V- likely now ESRD- history of uncontrolled DMII  Nephrotic syndrome with ansaraca; History of microscopic hematuria  HTN emergency/D CHF/[place]cut pulm edema/Pericardial effusion/Pleural effusion  Secondary HPT  Hemolytic anemia/Thrombocytopenia; likely component of Anemia of CKD as well  Dialysis dependent since 7/7    Plan:    - Initiated RRT on 7/7, tunneled cath placed 7/12 but not working - if cathflo does not help, will need revision next week. Hold discahrge. HD MWF for now. Anticipate eventuallydischarge with outpatient dialysis with possible  outpatient renal biopsy.  - Adjust oral BP medication and diuretics as needed. UF as tolerated. Renal diet with a 1.5L fluid restriction.   - Hgb is improved after 2u of blood- heme/onc input apprecaited. Plan to transfuse for Hgb < 7 and steroid taper (will help BGs too).  - Additional 500mg Keppra After HD on HD days.    Thank you for allowing us to participate in this patient's care. We will continue to follow.    Vital Signs:  Temp Readings from Last 3 Encounters:   07/15/22 97 °F (36.1 °C)   22 98.2 °F (36.8 °C) (Oral)   22 98.1 °F (36.7 °C) (Axillary)       Pulse Readings from Last 3 Encounters:   07/15/22 98   22 100   22 110       BP Readings from Last 3 Encounters:   07/15/22 (!) 150/102   22 (!) 173/105   22 129/81       Weight:  Wt Readings from Last 3 Encounters:   22 76.2 kg (167 lb 15.9 oz)   22 73.9 kg (163 lb)   22 74.2 kg (163 lb 9.3 oz)       INS/OUTS:  I/O last 3 completed shifts:  In: 1527 [P.O.:1527]  Out: -   No intake/output data recorded.    Past Medical & Surgical History:  Past Medical History:   Diagnosis Date    CKD (chronic kidney disease), stage IV 2022    Diabetes mellitus due to underlying condition with unspecified complications 2022    Gastroparesis 2022    Heart failure with preserved ejection fraction 2022    EF 55% on 3/22    History of gastroesophageal reflux (GERD)     History of supraventricular tachycardia     Hyperkalemia 2022    Sickle cell trait 2022    Type 2 diabetes mellitus        Past Surgical History:   Procedure Laterality Date     SECTION      x 3    ESOPHAGOGASTRODUODENOSCOPY N/A 10/18/2019    Procedure: ESOPHAGOGASTRODUODENOSCOPY (EGD);  Surgeon: Gianluca Mendez MD;  Location: [facility];  Service: Endoscopy;  Laterality: N/A;    PLACEMENT OF DUAL-LUMEN VASCULAR CATHETER Left 2022    Procedure: INSERTION-CATHETER-JOSEPH;  Surgeon: Dionte Gan MD;   [facility];  Service: General;  Laterality: Left;       Past Social History:  Social History     Socioeconomic History    Marital status:    Tobacco Use    Smoking status: Never Smoker    Smokeless tobacco: Never Used   Substance and Sexual Activity    Alcohol use: No    Drug use: No    Sexual activity: Not Currently     Partners: Male     Birth control/protection: I.U.D.       Medications:  Scheduled Meds:   amLODIPine  10 mg Oral Daily    epoetin teresa-epbx  10,000 Units Intravenous Every Mon, Wed, Fri    famotidine  20 mg Oral Daily    heparin (porcine)  4,000 Units Intra-Catheter Once    insulin aspart U-100  10 Units Subcutaneous TIDWM    insulin detemir U-100  15 Units Subcutaneous BID    isosorbide mononitrate  60 mg Oral Daily    levETIRAcetam  500 mg Oral BID    levETIRAcetam  500 mg Oral Every Mon, Wed, Fri    predniSONE  60 mg Oral Daily     Continuous Infusions:   sodium chloride 0.9% 10 mL/hr at 22 1250     PRN Meds:.sodium chloride, sodium chloride, acetaminophen-codeine 300-30mg, dextrose 10%, dextrose 10%, dextrose 10%, glucagon (human recombinant), glucose, glucose, heparin (porcine), HYDROcodone-acetaminophen, insulin aspart U-100, labetaloL, melatonin, sodium chloride 0.9%  No current facility-administered medications on file prior to encounter.     Current Outpatient Medications on File Prior to Encounter   Medication Sig Dispense Refill    FLUoxetine 20 MG capsule Take 1 capsule (20 mg total) by mouth once daily. 30 capsule 1    LANTUS U-100 INSULIN 100 unit/mL injection SMARTSI Unit(s) SUB-Q Every Morning      levETIRAcetam (KEPPRA) 500 MG Tab Take 1 tablet (500 mg total) by mouth 2 (two) times daily. 60 tablet 1    [DISCONTINUED] isosorbide mononitrate (IMDUR) 60 MG 24 hr tablet Take 1 tablet (60 mg total) by mouth once daily. 30 tablet 1    [DISCONTINUED] torsemide (DEMADEX) 20 MG Tab Take 1 tablet (20 mg total) by mouth 2 (two) times a day.  "(Patient taking differently: Take 20 mg by mouth once daily.) 60 tablet 1    [DISCONTINUED] furosemide (LASIX) 40 MG tablet Take 1 tablet (40 mg total) by mouth 2 (two) times daily. 60 tablet 0    [DISCONTINUED] pantoprazole (PROTONIX) 40 MG tablet Take 1 tablet (40 mg total) by mouth once daily. 30 tablet 3       Allergies:  Penicillins    Past Family History:  Reviewed; refer to Hospitalist Admission Note    Review of Systems:  Review of Systems - All 14 systems reviewed and negative, except as noted in HPI    Physical Exam:  BP (!) 150/102   Pulse 98   Temp 97 °F (36.1 °C)   Resp 18   Ht 5' 2" (1.575 m)   Wt 76.2 kg (167 lb 15.9 oz)   LMP 12/01/2021 (Approximate)   SpO2 95%   Breastfeeding No   BMI 30.73 kg/m²     General Appearance:    NAD, AAO x 3, cooperative, appears stated age   Head:    Normocephalic, atraumatic   Eyes:    PER, EOMI, and conjunctiva/sclera clear bilaterally        Mouth:   Moist mucus membranes   Back:     No CVA tenderness   Lungs:     Rales   Heart:    Regular rate and rhythm, S1 and S2 normal, no murmur, rub   or gallop   Abdomen:     Soft, non-tender, non-distended, bowel sounds active all four   quadrants, no RT or guarding, no masses, no organomegaly   Extremities:   Warm and well perfused, anasarca   MSK:   No joint or muscle swelling, tenderness or deformity   Skin:   Skin color, texture, turgor normal, no rashes or lesions   Neurologic/Psychiatric:   CNII-XII intact, normal strength and sensation       throughout, no asterixis; normal affect, memory, judgement     and insight     Results:  Recent Labs   Lab 07/13/22  0551 07/14/22  0429 07/15/22  0445   * 133* 132*   K 5.1 4.0 4.2    99 100   CO2 20* 22* 22*   BUN 38* 32* 51*   CREATININE 3.8* 3.2* 3.7*   ESTGFRAFRICA 17* 21* 18*   EGFRNONAA 15* 18* 15*   * 361* 240*       Recent Labs   Lab 07/13/22  0551 07/14/22  0429 07/15/22  0445   CALCIUM 7.9* 8.0* 8.0*   ALBUMIN 1.7* 1.8* 1.8*       Recent Labs "   Lab 07/08/22  0516   PTH, Intact 289.8 H       Recent Labs   Lab 07/13/22  1253 07/13/22  1629 07/13/22  1851 07/13/22  2034 07/14/22  0714 07/14/22  1153 07/14/22  1627 07/14/22  2154 07/15/22  0756 07/15/22  1134   POCTGLUCOSE >500* 361* 310* 285* 397* 344* 262* 296* 222* 160*       Recent Labs   Lab 01/26/22  0240 03/06/22  1135 05/15/22  1954   Hemoglobin A1C 6.8 H 5.3 5.8 H       Recent Labs   Lab 07/13/22  0551 07/14/22  0429 07/15/22  0445   WBC 4.96 7.38 8.29   HGB 9.1* 9.2* 10.0*   HCT 25.5* 25.5* 28.3*   * 160 169   MCV 81* 80* 81*   MCHC 35.7 36.1* 35.3   MONO 3.6*  0.2* 5.7  0.4 3.9*  0.3       Recent Labs   Lab 07/08/22  1517 07/11/22  0924 07/13/22  0551 07/14/22  0429 07/15/22  0445   BILITOT 1.2*   < > 0.5 0.5 0.5   BILIDIR 0.4*  --   --   --   --    PROT 5.1*   < > 5.2* 5.4* 5.1*   ALBUMIN 2.0*   < > 1.7* 1.8* 1.8*   ALKPHOS 86   < > 100 101 93   ALT 15   < > 11 13 11   AST 17   < > 10 10 10    < > = values in this interval not displayed.       Recent Labs   Lab 05/28/22  2036 06/11/22  1115 07/07/22  0912   Color, UA Yellow Yellow Yellow   Appearance, UA Clear Clear Clear   pH, UA 7.0 7.0 6.0   Specific [place], UA 1.020 1.015 1.020   Protein, UA 3+ A 3+ A 3+ A   Glucose, UA 2+ A 2+ A Negative   Ketones, UA Negative Negative Negative   Urobilinogen, UA Negative Negative Negative   Bilirubin (UA) Negative Negative Negative   Occult Blood UA 2+ A 1+ A 2+ A   Nitrite, UA Negative Negative Negative   RBC, UA 5 H 3 3   WBC, UA 1 4 8 H   Bacteria None Occasional Rare   Hyaline Casts, UA 0 0 0       Recent Labs   Lab 12/13/20  1042 04/23/21  1952 04/23/21 2006 04/11/22 2043 04/11/22  2345 04/12/22  0211   POC PH 7.457 H 7.458 H  --  7.365  --   --    POC PCO2 32.6 L 32.9 L  --  39.5  --   --    POC HCO3 23.0 L 23.3 L  --  22.6 L  --   --    POC PO2 26 LL 24 LL  --  25 L  --   --    POC SATURATED O2 53 L 47 L  --  42 L  --   --    POC BE -1 -1  --  -3  --   --    Sample VENOUS VENOUS   < >  VENOUS VENOUS VENOUS    < > = values in this interval not displayed.     I have spent > minutes providing care for this patient for the above diagnoses. These services have included chart/data/imaging review, evaluation, exam, formulation of plan, , note preparation, and discussions with staff involved in this patient's care.    Mando Benitez MD    [facility] [address] [address]  [phone] (p)  [phone] (f)

## 2022-07-15 NOTE — CONSULTS
Start of care confirmed for Monday 7/18 @ 1130. [facility] Monday @ 1115 to finish onboard paperwork

## 2022-07-15 NOTE — PLAN OF CARE
Problem: Adult Inpatient Plan of Care  Goal: Plan of Care Review  Outcome: Ongoing, Progressing     Problem: Adult Inpatient Plan of Care  Goal: Patient-Specific Goal (Individualized)  Outcome: Ongoing, Progressing     Problem: Diabetes Comorbidity  Goal: Blood Glucose Level Within Targeted Range  Outcome: Ongoing, Progressing     Problem: Fall Injury Risk  Goal: Absence of Fall and Fall-Related Injury  Outcome: Ongoing, Progressing     Problem: Skin Injury Risk Increased  Goal: Skin Health and Integrity  Outcome: Ongoing, Progressing    POC reviewed with patient. Dietitian recommendations discussed. Tunnel cath to left chest intact. PIV to right hand intact and saline lock, Trialysis cath in place to right IJ. Discuss with Dr. Benitez regarding removal. He stated that cath needs to come out. Charge nurse informed and stated that will have to be removed by provider. Handoff given to oncoming shift. Safety/Fall precautions maintained. Bed low, wheels locked, call light within reach. Tele [id number] SR to Tachy. No urine output this shift.

## 2022-07-15 NOTE — PLAN OF CARE
Plan of care reviewed with patient. SR/ST on telemetry. HS bg 296, covered with ss insulin as per orders. RIJ Trialysis cath intact. L Chest tunnel cath intact. Remains free from falls/injury. Instructed to call for assistance as needed , verbalized understanding. Call light in reach

## 2022-07-16 PROBLEM — I16.1 HYPERTENSIVE EMERGENCY: Status: RESOLVED | Noted: 2022-07-08 | Resolved: 2022-07-16

## 2022-07-16 LAB
ALBUMIN SERPL BCP-MCNC: 1.8 G/DL (ref 3.5–5.2)
ALP SERPL-CCNC: 86 U/L (ref 55–135)
ALT SERPL W/O P-5'-P-CCNC: 16 U/L (ref 10–44)
ANION GAP SERPL CALC-SCNC: 12 MMOL/L (ref 8–16)
AST SERPL-CCNC: 15 U/L (ref 10–40)
BASOPHILS # BLD AUTO: 0.01 K/UL (ref 0–0.2)
BASOPHILS NFR BLD: 0.1 % (ref 0–1.9)
BILIRUB SERPL-MCNC: 0.6 MG/DL (ref 0.1–1)
BUN SERPL-MCNC: 49 MG/DL (ref 6–20)
CALCIUM SERPL-MCNC: 7.8 MG/DL (ref 8.7–10.5)
CHLORIDE SERPL-SCNC: 102 MMOL/L (ref 95–110)
CO2 SERPL-SCNC: 20 MMOL/L (ref 23–29)
CREAT SERPL-MCNC: 3.5 MG/DL (ref 0.5–1.4)
DIFFERENTIAL METHOD: ABNORMAL
EOSINOPHIL # BLD AUTO: 0 K/UL (ref 0–0.5)
EOSINOPHIL NFR BLD: 0.3 % (ref 0–8)
ERYTHROCYTE [DISTWIDTH] IN BLOOD BY AUTOMATED COUNT: 14.5 % (ref 11.5–14.5)
EST. GFR  (AFRICAN AMERICAN): 19 ML/MIN/1.73 M^2
EST. GFR  (NON AFRICAN AMERICAN): 16 ML/MIN/1.73 M^2
GLUCOSE SERPL-MCNC: 159 MG/DL (ref 70–110)
HCT VFR BLD AUTO: 27.8 % (ref 37–48.5)
HGB BLD-MCNC: 10.2 G/DL (ref 12–16)
IMM GRANULOCYTES # BLD AUTO: 0.27 K/UL (ref 0–0.04)
IMM GRANULOCYTES NFR BLD AUTO: 2.2 % (ref 0–0.5)
LYMPHOCYTES # BLD AUTO: 1.6 K/UL (ref 1–4.8)
LYMPHOCYTES NFR BLD: 12.8 % (ref 18–48)
MCH RBC QN AUTO: 29.4 PG (ref 27–31)
MCHC RBC AUTO-ENTMCNC: 36.7 G/DL (ref 32–36)
MCV RBC AUTO: 80 FL (ref 82–98)
MONOCYTES # BLD AUTO: 1.5 K/UL (ref 0.3–1)
MONOCYTES NFR BLD: 12.1 % (ref 4–15)
NEUTROPHILS # BLD AUTO: 9.1 K/UL (ref 1.8–7.7)
NEUTROPHILS NFR BLD: 72.5 % (ref 38–73)
NRBC BLD-RTO: 1 /100 WBC
PLATELET # BLD AUTO: 164 K/UL (ref 150–450)
PMV BLD AUTO: 10.1 FL (ref 9.2–12.9)
POCT GLUCOSE: 102 MG/DL (ref 70–110)
POCT GLUCOSE: 151 MG/DL (ref 70–110)
POCT GLUCOSE: 50 MG/DL (ref 70–110)
POCT GLUCOSE: 85 MG/DL (ref 70–110)
POCT GLUCOSE: 94 MG/DL (ref 70–110)
POTASSIUM SERPL-SCNC: 3.6 MMOL/L (ref 3.5–5.1)
PROT SERPL-MCNC: 4.8 G/DL (ref 6–8.4)
RBC # BLD AUTO: 3.47 M/UL (ref 4–5.4)
SODIUM SERPL-SCNC: 134 MMOL/L (ref 136–145)
WBC # BLD AUTO: 12.48 K/UL (ref 3.9–12.7)

## 2022-07-16 PROCEDURE — 99233 SBSQ HOSP IP/OBS HIGH 50: CPT | Mod: ,,, | Performed by: INTERNAL MEDICINE

## 2022-07-16 PROCEDURE — 94761 N-INVAS EAR/PLS OXIMETRY MLT: CPT

## 2022-07-16 PROCEDURE — 99233 PR SUBSEQUENT HOSPITAL CARE,LEVL III: ICD-10-PCS | Mod: ,,, | Performed by: INTERNAL MEDICINE

## 2022-07-16 PROCEDURE — 36415 COLL VENOUS BLD VENIPUNCTURE: CPT | Performed by: HOSPITALIST

## 2022-07-16 PROCEDURE — 25000003 PHARM REV CODE 250: Performed by: HOSPITALIST

## 2022-07-16 PROCEDURE — 80100014 HC HEMODIALYSIS 1:1

## 2022-07-16 PROCEDURE — 63600175 PHARM REV CODE 636 W HCPCS: Performed by: NURSE PRACTITIONER

## 2022-07-16 PROCEDURE — 80053 COMPREHEN METABOLIC PANEL: CPT | Performed by: HOSPITALIST

## 2022-07-16 PROCEDURE — 25000003 PHARM REV CODE 250: Performed by: INTERNAL MEDICINE

## 2022-07-16 PROCEDURE — 12000002 HC ACUTE/MED SURGE SEMI-PRIVATE ROOM

## 2022-07-16 PROCEDURE — 63600175 PHARM REV CODE 636 W HCPCS: Performed by: INTERNAL MEDICINE

## 2022-07-16 PROCEDURE — 85025 COMPLETE CBC W/AUTO DIFF WBC: CPT | Performed by: HOSPITALIST

## 2022-07-16 PROCEDURE — 63600175 PHARM REV CODE 636 W HCPCS: Performed by: HOSPITALIST

## 2022-07-16 RX ORDER — ONDANSETRON 2 MG/ML
4 INJECTION INTRAMUSCULAR; INTRAVENOUS EVERY 6 HOURS PRN
Status: DISCONTINUED | OUTPATIENT
Start: 2022-07-16 | End: 2022-07-16

## 2022-07-16 RX ORDER — PROCHLORPERAZINE EDISYLATE 5 MG/ML
10 INJECTION INTRAMUSCULAR; INTRAVENOUS EVERY 6 HOURS PRN
Status: DISCONTINUED | OUTPATIENT
Start: 2022-07-16 | End: 2022-07-17 | Stop reason: HOSPADM

## 2022-07-16 RX ORDER — ISOSORBIDE MONONITRATE 30 MG/1
60 TABLET, EXTENDED RELEASE ORAL ONCE
Status: COMPLETED | OUTPATIENT
Start: 2022-07-16 | End: 2022-07-16

## 2022-07-16 RX ORDER — ONDANSETRON 2 MG/ML
8 INJECTION INTRAMUSCULAR; INTRAVENOUS EVERY 6 HOURS PRN
Status: DISCONTINUED | OUTPATIENT
Start: 2022-07-16 | End: 2022-07-17 | Stop reason: HOSPADM

## 2022-07-16 RX ORDER — HYDROMORPHONE HYDROCHLORIDE 1 MG/ML
1 INJECTION, SOLUTION INTRAMUSCULAR; INTRAVENOUS; SUBCUTANEOUS ONCE
Status: COMPLETED | OUTPATIENT
Start: 2022-07-16 | End: 2022-07-16

## 2022-07-16 RX ORDER — ISOSORBIDE MONONITRATE 30 MG/1
120 TABLET, EXTENDED RELEASE ORAL DAILY
Status: DISCONTINUED | OUTPATIENT
Start: 2022-07-17 | End: 2022-07-17 | Stop reason: HOSPADM

## 2022-07-16 RX ADMIN — ISOSORBIDE MONONITRATE 60 MG: 60 TABLET, EXTENDED RELEASE ORAL at 08:07

## 2022-07-16 RX ADMIN — HYDROCODONE BITARTRATE AND ACETAMINOPHEN 1 TABLET: 5; 325 TABLET ORAL at 07:07

## 2022-07-16 RX ADMIN — ISOSORBIDE MONONITRATE 60 MG: 30 TABLET, EXTENDED RELEASE ORAL at 01:07

## 2022-07-16 RX ADMIN — INSULIN ASPART 10 UNITS: 100 INJECTION, SOLUTION INTRAVENOUS; SUBCUTANEOUS at 08:07

## 2022-07-16 RX ADMIN — HEPARIN SODIUM 4000 UNITS: 1000 INJECTION INTRAVENOUS; SUBCUTANEOUS at 12:07

## 2022-07-16 RX ADMIN — PREDNISONE 60 MG: 20 TABLET ORAL at 08:07

## 2022-07-16 RX ADMIN — HYDROMORPHONE HYDROCHLORIDE 1 MG: 1 INJECTION, SOLUTION INTRAMUSCULAR; INTRAVENOUS; SUBCUTANEOUS at 08:07

## 2022-07-16 RX ADMIN — ONDANSETRON 8 MG: 2 INJECTION INTRAMUSCULAR; INTRAVENOUS at 07:07

## 2022-07-16 RX ADMIN — LABETALOL HYDROCHLORIDE 10 MG: 5 INJECTION INTRAVENOUS at 04:07

## 2022-07-16 RX ADMIN — HYDROCODONE BITARTRATE AND ACETAMINOPHEN 1 TABLET: 5; 325 TABLET ORAL at 08:07

## 2022-07-16 RX ADMIN — LEVETIRACETAM 500 MG: 500 TABLET, FILM COATED ORAL at 08:07

## 2022-07-16 RX ADMIN — FAMOTIDINE 20 MG: 20 TABLET ORAL at 08:07

## 2022-07-16 RX ADMIN — ONDANSETRON 8 MG: 2 INJECTION INTRAMUSCULAR; INTRAVENOUS at 09:07

## 2022-07-16 RX ADMIN — LEVETIRACETAM 500 MG: 500 TABLET, FILM COATED ORAL at 09:07

## 2022-07-16 RX ADMIN — ONDANSETRON 8 MG: 2 INJECTION INTRAMUSCULAR; INTRAVENOUS at 05:07

## 2022-07-16 RX ADMIN — PROCHLORPERAZINE EDISYLATE 10 MG: 5 INJECTION INTRAMUSCULAR; INTRAVENOUS at 08:07

## 2022-07-16 RX ADMIN — AMLODIPINE BESYLATE 10 MG: 5 TABLET ORAL at 08:07

## 2022-07-16 NOTE — NURSING
BG 94 after 2 Apple juice . Refused to eat 2 regular jello's. Yelled at nurse states she don't want it now.  Aniya Gallego informed Dr Baires.  He states ok to give her 1 regular vanilla pudding. to increase BG.

## 2022-07-16 NOTE — PLAN OF CARE
Plan of care reviewed with pt. Pt verbalized understanding. Patient is alert and oriented x 4, able to make needs known. Continuous cardiac monitoring, TELE #[id number] toST. A-febrile throughout the shift. Meds given per MAR. BG monitored closely, HS , covered with 3 units per sliding scale orders. Ambulated to ambulated to bathroom independently. RIJ Trialysis cath intact. L Chest tunnel cath intact. No complaints of pain or discomfort. Purposeful hourly/q2hr rounding done during shift to promote patient safety. Safety maintained with side rails up x3, bed wheels locked, bed in lowest positioned, call light in reach. Patient educated to call for assistance with ambulation if needed, verbalized understanding. Pt remains free of falls. Will continue to monitor.

## 2022-07-16 NOTE — CARE UPDATE
07/16/22 0749   PRE-TX-O2   O2 Device (Oxygen Therapy) room air   Oxygen Concentration (%) 21   SpO2 97 %   Pulse Oximetry Type Intermittent   $ Pulse Oximetry - Multiple Charge Pulse Oximetry - Multiple   Pulse 99   Resp 15

## 2022-07-16 NOTE — PROGRESS NOTES
"HPI      Interval History: blood sugars high but improved with with steroids taper.     32 y.o. female with a PMHx of DM II, supraventricular tachycardia, heart failure with preserved ejection fraction (22), Sickle cell trait, and CKD (stage IV) sent to the ED because of abnormal labs.   c/o shortness of breathness, feeling lethargic and generalized weakness for few weeks but has been feeling worse.   c/o diffuse back pain over the past month, intermittent chills as well as diarrhea a few days ago that has since improved.  Patient specifies she is able to urinate but notes that the volume has decreased.  Also states that her  leg swelling has been getting worse due to her being "full of fluid."  LMP was in 2021.  per med rec.  Patient reports she had irregular menstrual cycles up until December.  She has been receiving intermittent blood transfusions  hospital since December     Oncology Consult:  We have been consulted for severe anemia.  On arrival she was noted to have a hemoglobin of 4.9.  She is now status post 2 units packed red blood cell.  Patient reports she seen a hematologist years ago and was told she had sickle cell trait.       Past Medical History:   Diagnosis Date    CKD (chronic kidney disease), stage IV 2022    Diabetes mellitus due to underlying condition with unspecified complications 2022    Gastroparesis 2022    Heart failure with preserved ejection fraction 2022    EF 55% on 3/22    History of gastroesophageal reflux (GERD)     History of supraventricular tachycardia     Hyperkalemia 2022    Sickle cell trait 2022    Type 2 diabetes mellitus      Past Surgical History:   Procedure Laterality Date     SECTION      x 3    ESOPHAGOGASTRODUODENOSCOPY N/A 10/18/2019    Procedure: ESOPHAGOGASTRODUODENOSCOPY (EGD);  Surgeon: Gianluca Mendez MD;  Location: [facility];  Service: Endoscopy;  Laterality: N/A;    PLACEMENT OF DUAL-LUMEN " VASCULAR CATHETER Left 7/12/2022    Procedure: INSERTION-CATHETER-JOSEPH;  Surgeon: Dionte Gan MD;  [facility];  Service: General;  Laterality: Left;     Social History     Socioeconomic History    Marital status:    Tobacco Use    Smoking status: Never Smoker    Smokeless tobacco: Never Used   Substance and Sexual Activity    Alcohol use: No    Drug use: No    Sexual activity: Not Currently     Partners: Male     Birth control/protection: I.U.D.     Review of patient's allergies indicates:   Allergen Reactions    Penicillins Hives       Physical exam    Vitals:    07/16/22 0642   BP: (!) 160/95   Pulse: 98   Resp:    Temp:      Right central catheter neck  Patient awake alert no acute distress laying comfortably in hospital bed   P[place]o  Bilateral lower extremity edema  Skin warm and dry  Normal mood  Normal respiratory effort  Tachycardic    Lab Results   Component Value Date    WBC 12.48 07/16/2022    HGB 10.2 (L) 07/16/2022    HCT 27.8 (L) 07/16/2022    MCV 80 (L) 07/16/2022     07/16/2022       CMP  Sodium   Date Value Ref Range Status   07/16/2022 134 (L) 136 - 145 mmol/L Final     Potassium   Date Value Ref Range Status   07/16/2022 3.6 3.5 - 5.1 mmol/L Final     Chloride   Date Value Ref Range Status   07/16/2022 102 95 - 110 mmol/L Final     CO2   Date Value Ref Range Status   07/16/2022 20 (L) 23 - 29 mmol/L Final     Glucose   Date Value Ref Range Status   07/16/2022 159 (H) 70 - 110 mg/dL Final     BUN   Date Value Ref Range Status   07/16/2022 49 (H) 6 - 20 mg/dL Final     Creatinine   Date Value Ref Range Status   07/16/2022 3.5 (H) 0.5 - 1.4 mg/dL Final     Calcium   Date Value Ref Range Status   07/16/2022 7.8 (L) 8.7 - 10.5 mg/dL Final     Total Protein   Date Value Ref Range Status   07/16/2022 4.8 (L) 6.0 - 8.4 g/dL Final     Albumin   Date Value Ref Range Status   07/16/2022 1.8 (L) 3.5 - 5.2 g/dL Final     Total Bilirubin   Date Value Ref Range Status    07/16/2022 0.6 0.1 - 1.0 mg/dL Final     Comment:     For infants and newborns, interpretation of results should be based  on gestational age, weight and in agreement with clinical  observations.    Premature Infant recommended reference ranges:  Up to 24 hours.............<8.0 mg/dL  Up to 48 hours............<12.0 mg/dL  3-5 days..................<15.0 mg/dL  6-29 days.................<15.0 mg/dL       Alkaline Phosphatase   Date Value Ref Range Status   07/16/2022 86 55 - 135 U/L Final     AST   Date Value Ref Range Status   07/16/2022 15 10 - 40 U/L Final     ALT   Date Value Ref Range Status   07/16/2022 16 10 - 44 U/L Final     Anion Gap   Date Value Ref Range Status   07/16/2022 12 8 - 16 mmol/L Final     eGFR if    Date Value Ref Range Status   07/16/2022 19 (A) >60 mL/min/1.73 m^2 Final     eGFR if non    Date Value Ref Range Status   07/16/2022 16 (A) >60 mL/min/1.73 m^2 Final     Comment:     Calculation used to obtain the estimated glomerular filtration  rate (eGFR) is the CKD-EPI equation.        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    Jemal Study is negative.    Chronic kidney disease on hemodialysis currently.    Thrombocytopenia resolved    Hypertension urgency better controlled    Elevated glucose level secondary to medication    Improvement in total bilirubin.    > transfuse if hemoglobin less than 7 grams/deciliter.  > anemia appear to be stable and thrombocytopenia improved stable.  Prednisone 60 mg currently, reduce prednisone to 40 mg daily for 3 days then 20 mg daily for 3 days then 10 mg daily for 3 days then 5 mg daily for 3 days then stop.   > hematology will continue to follow while in patient.  > continue CBC monitoring daily.  > we can give her IV iron Venofer 200 mg IV once daily x3 days.

## 2022-07-16 NOTE — PROGRESS NOTES
"[facility] Medicine  Progress Note    Patient Name: Tabby Howard  MRN: [id number]  Patient Class: IP- Inpatient   Admission Date: 2022  Length of Stay: 9 days  Attending Physician: Keegan Cody MD  Primary Care Provider: Primary Doctor No        Subjective:     Principal Problem:Acute renal failure superimposed on chronic kidney disease        HPI:  Tabby Howard is a 32 y.o. female with a PMHx of DM II, supraventricular tachycardia, heart failure with preserved ejection fraction (22), Sickle cell trait, and CKD (stage IV) who states that she came to the ED because of abnormal labs. She has been having shortness of breathness, feeling lethargic and generalized weakness for few weeks but has been feeling worse.  Patient had lab work done this morning and was informed of abnormal "kidney" results with referral to the ED for further evaluation and work up.  She endorses diffuse back pain over the past month, intermittent chills as well as diarrhea a few days ago that has since improved.  Patient specifies she is able to urinate but notes that the volume has decreased.  Also states that her  leg swelling has been getting worse due to her being "full of fluid."  LMP was in 2021.  The patient denies fever, N/V, abdominal pain on exam, CP or any other symptoms at this time.  PSHx includes EGD and .  Does not see a nephrologist for her CKD     The history is provided by the patient.          Overview/Hospital Course:  Patient was admitted for acute on chronic kidney disease with hyperkalemia and acidosis.  Patient was also found to be in hypertensive emergency.  On arrival patient was anemic Hb 4. Patient was transfused 2 units of blood with her new dialysis.  On arrival K 6.4, CO2 15. Nephrology was consulted patient was started on hemodialysis after dialysis access was placed.  For blood pressure control started NTG gtt and when blood pressure was not " controlled with nitro drip patient was switched to nicardipine gtt patient continued to be oliguric, hemoglobin improved after cut transition, hematology was consulted for further evaluation of anemia.  Workup showed hemolytic anemia which is attributed hemolysis during hypertensive emergency.  Patient's potassium improved acidosis improved after hemodialysis.  Tunneled catheter was placed 7/12. She was started on IV steroid by hematology, after which insulin had to be increased for hyperglycemia.       Interval History: , no new complains. HD in AM. BGs better, No acute events overnight. Bp running high    Review of Systems   Constitutional:  Positive for fatigue. Negative for chills.   HENT:  Negative for sore throat.    Respiratory:  Negative for cough and shortness of breath.    Gastrointestinal:  Negative for abdominal pain, nausea and vomiting.   Genitourinary:  Negative for decreased urine volume.   Musculoskeletal:  Negative for back pain and neck pain.   Skin:  Positive for pallor.   Neurological:  Positive for weakness. Negative for seizures and headaches.   Psychiatric/Behavioral:  Negative for behavioral problems.    Objective:     Vital Signs (Most Recent):  Temp: 97.6 °F (36.4 °C) (07/16/22 1500)  Pulse: 105 (07/16/22 1500)  Resp: 18 (07/16/22 1500)  BP: (!) 163/89 (07/16/22 1500)  SpO2: 96 % (07/16/22 1500)   Vital Signs (24h Range):  Temp:  [97.1 °F (36.2 °C)-98.8 °F (37.1 °C)] 97.6 °F (36.4 °C)  Pulse:  [] 105  Resp:  [15-20] 18  SpO2:  [95 %-100 %] 96 %  BP: (142-207)/() 163/89     Weight: 79.2 kg (174 lb 9.7 oz)  Body mass index is 31.94 kg/m².    Intake/Output Summary (Last 24 hours) at 7/16/2022 1619  Last data filed at 7/16/2022 1215  Gross per 24 hour   Intake 1060 ml   Output 3000 ml   Net -1940 ml      Physical Exam  Vitals and nursing note reviewed.   Constitutional:       General: She is not in acute distress.     Appearance: She is well-developed. She is ill-appearing. She is  not diaphoretic.      Comments: Anasarca.   HENT:      Head: Normocephalic and atraumatic.      Right Ear: External ear normal.      Left Ear: External ear normal.      Nose: Nose normal.      Mouth/Throat:      Mouth: Mucous membranes are moist.   Eyes:      General: No scleral icterus.     Extraocular Movements: Extraocular movements intact.      Conjunctiva/sclera: Conjunctivae normal.      Pupils: Pupils are equal, round, and reactive to light.   Neck:      Vascular: No JVD.   Cardiovascular:      Rate and Rhythm: Normal rate and regular rhythm.      Pulses: Normal pulses.      Heart sounds: Normal heart sounds. No murmur heard.    No friction rub. No gallop.   Pulmonary:      Effort: Pulmonary effort is normal. No respiratory distress.      Breath sounds: Normal breath sounds. No wheezing or rales.   Abdominal:      General: Bowel sounds are normal. There is no distension.      Palpations: Abdomen is soft.      Tenderness: There is no abdominal tenderness. There is no guarding or rebound.   Genitourinary:     Comments: Pelayo.  Musculoskeletal:         General: No tenderness. Normal range of motion.      Cervical back: Neck supple.      Right lower leg: Edema present.      Left lower leg: Edema present.   Skin:     General: Skin is warm and dry.      Coloration: Skin is not pale.      Findings: No erythema or rash.   Neurological:      Mental Status: She is oriented to person, place, and time.      Cranial Nerves: No cranial nerve deficit.      Motor: No weakness.   Psychiatric:         Mood and Affect: Mood normal.         Behavior: Behavior normal.       Significant Labs: All pertinent labs within the past 24 hours have been reviewed.  BMP:   Recent Labs   Lab 07/16/22  0440   *   *   K 3.6      CO2 20*   BUN 49*   CREATININE 3.5*   CALCIUM 7.8*     CBC:   Recent Labs   Lab 07/15/22  0445 07/16/22  0440   WBC 8.29 12.48   HGB 10.0* 10.2*   HCT 28.3* 27.8*    164       Significant  Imaging: I have reviewed all pertinent imaging results/findings within the past 24 hours.      Assessment/Plan:      * Acute renal failure superimposed on chronic kidney disease  New HD  Follow renal panel and electrolytes closely.  Follow renal recommendations.  CT r/o renal stone  Adjust renal dose medications for creatinine clearance 10-50cc/min.  Avoid NSAIDs, De Leon-II inhibitors, ACE-I, Angiotensin Receptor Blockers, or Aminoglycosides.  Urine analysis.  Will defer following workup to nephro   JUAN A, C3, C4, ESR, UPEP and SPEP  Tunneled cath 7/12                  Hypertensive emergency  Off  Cardene drip  Started on amlodipine and isosorbide mononitrate  7/11 increased amlodipine dose    Anemia  Patient's anemia is currently controlled. Has recieved 2 units of PRBCs on 7/7. Etiology likely d/t Anemia of acute illness + Hemolytic anemia  Retic count elevated   Will cont to trend HnH   Current CBC reviewed-   Lab Results   Component Value Date    HGB 9.1 (L) 07/13/2022    HCT 25.5 (L) 07/13/2022     Monitor serial CBC and transfuse if patient becomes hemodynamically unstable, symptomatic or H/H drops below 7/21.   7/11 Transfuse 1 unit PRBC  On IV steroids per hematology      Pericardial effusion  ECHO shows · Moderate circumferential pericardial effusion.     The left ventricle is normal in size with moderate concentric hypertrophy and low normal systolic function.  · The estimated ejection fraction is 50%.  · Normal left ventricular diastolic function.  · Normal right ventricular size with normal right ventricular systolic function.          Seizure  Hx noted      Sickle cell trait  Hx noted        CKD (chronic kidney disease), stage IV  Hx noted      Anemia of chronic kidney failure  Hx noted  Ml 2/2  hypertensive emergency and acute illness   S/p  2 units   Patient's anemia is currently controlled. Has recieved 2 units of PRBCs on 7/7.   Current CBC reviewed-   Lab Results   Component Value Date    HGB 9.1 (L)  07/13/2022    HCT 25.5 (L) 07/13/2022     Monitor serial CBC and transfuse if patient becomes hemodynamically unstable, symptomatic or H/H drops below 7/21.         Type II diabetes mellitus     Patient's FSGs are uncontrolled on current medication regimen. Hyperglycemic from steroids  Will readjust basal insulin  Last A1c reviewed-   Lab Results   Component Value Date    HGBA1C 5.8 (H) 05/15/2022     Most recent fingerstick glucose reviewed-   Recent Labs   Lab 07/13/22  1629 07/13/22  1851 07/13/22  2034 07/14/22  0714   POCTGLUCOSE 361* 310* 285* 397*     Current correctional scale  Medium  Increase anti-hyperglycemic dose as follows-   Antihyperglycemics (From admission, onward)            Start     Stop Route Frequency Ordered    07/13/22 2100  insulin detemir U-100 pen 12 Units         -- SubQ 2 times daily 07/13/22 1637    07/13/22 1645  insulin aspart U-100 pen 10 Units         -- SubQ 3 times daily with meals 07/13/22 1637    07/07/22 1719  insulin aspart U-100 pen 1-10 Units         -- SubQ Before meals & nightly PRN 07/07/22 1619        Hold Oral hypoglycemics while patient is in the hospital.          VTE Risk Mitigation (From admission, onward)         Ordered     heparin (porcine) injection 4,000 Units  As needed (PRN)         07/09/22 2022     IP VTE HIGH RISK PATIENT  Once         07/09/22 1957     Place JOCELYNE hose  Until discontinued         07/09/22 1957     heparin (porcine) injection 4,000 Units  Once         07/08/22 1624                Discharge Planning   TATIANA: 7/15/2022     Code Status: Prior   Is the patient medically ready for discharge?:     Reason for patient still in hospital (select all that apply): Patient trending condition and Treatment  Discharge Plan A: Home with family                  Keegan Cody MD  Department of Hospital Medicine   [facility]

## 2022-07-16 NOTE — NURSING
/102; labetalol IVP Q 6 hrs given 4 hrs ago for /102. Reported to Dialysis earlier and left message for Vicky NP.    C/O Pain given Hydrocodone- acetaminophen 5-325. Pt vomited 50 cc of grey stuff w poss cereal matter.    Dialysis estimated time is 9 am.

## 2022-07-16 NOTE — ASSESSMENT & PLAN NOTE
Hx noted     November 2, 2020      Chavez Perkins  [address]  [facility] [address]        Dear ,    We are writing to inform you of your test results.    Your test results fall within the expected range(s) or remain unchanged from previous results.  Please continue with current treatment plan.    Resulted Orders   Lipid panel reflex to direct LDL Fasting   Result Value Ref Range    Cholesterol 178 <200 mg/dL    Triglycerides 64 <150 mg/dL      Comment:      Fasting specimen    HDL Cholesterol 88 >39 mg/dL    LDL Cholesterol Calculated 77 <100 mg/dL      Comment:      Desirable:       <100 mg/dl    Non HDL Cholesterol 90 <130 mg/dL   Comprehensive metabolic panel (BMP + Alb, Alk Phos, ALT, AST, Total. Bili, TP)   Result Value Ref Range    Sodium 141 133 - 144 mmol/L    Potassium 4.3 3.4 - 5.3 mmol/L    Chloride 108 94 - 109 mmol/L    Carbon Dioxide 27 20 - 32 mmol/L    Anion Gap 6 3 - 14 mmol/L    Glucose 99 70 - 99 mg/dL      Comment:      Fasting specimen    Urea Nitrogen 24 7 - 30 mg/dL    Creatinine 1.24 0.66 - 1.25 mg/dL    GFR Estimate 56 (L) >60 mL/min/[1.73_m2]      Comment:      Non  GFR Calc  Starting 12/18/2018, serum creatinine based estimated GFR (eGFR) will be   calculated using the Chronic Kidney Disease Epidemiology Collaboration   (CKD-EPI) equation.      GFR Estimate If Black 65 >60 mL/min/[1.73_m2]      Comment:       GFR Calc  Starting 12/18/2018, serum creatinine based estimated GFR (eGFR) will be   calculated using the Chronic Kidney Disease Epidemiology Collaboration   (CKD-EPI) equation.      Calcium 9.1 8.5 - 10.1 mg/dL    Bilirubin Total 0.8 0.2 - 1.3 mg/dL    Albumin 3.9 3.4 - 5.0 g/dL    Protein Total 6.9 6.8 - 8.8 g/dL    Alkaline Phosphatase 74 40 - 150 U/L    ALT 24 0 - 70 U/L    AST 22 0 - 45 U/L       If you have any questions or concerns, please call the clinic at the number listed above.       Sincerely,        Fern Sheppard MD,  MD

## 2022-07-16 NOTE — PROGRESS NOTES
2500 ml net uf removed   07/16/22 1215   Handoff Report   Received From Dahlia   Given To Shivani   Vital Signs   Temp 97.1 °F (36.2 °C)   Pulse 95   Resp 18   BP (!) 177/100   Assessments (Pre/Post)   Blood Liters Processed (BLP) 36.1   Transport Modality not applicable   Level of Consciousness (AVPU) alert   Dialyzer Clearance mildly streaked   Pain   Preferred Pain Scale number (Numeric Rating Pain Scale)   Pain Rating (0-10): Rest 6   Tunneled Central Line Insertion/Assessment - Double Lumen  07/12/22 1441 left internal jugular   Placement Date/Time: 07/12/22 1441   Present Prior to Hospital Arrival?: No  Inserted by: MD  Hand Hygiene: Performed  Barrier Precautions: Performed  Skin Antisepsis: ChloraPrep  Location: left internal jugular  : [employer]  Lot Number: [id number]   Site Assessment No drainage;No redness;No swelling;No warmth   Line Securement Device Secured with sutures   Dressing Type Central line dressing   Dressing Status Clean;Dry;Intact   Dressing Intervention Sterile dressing change   Date on Dressing 07/16/22   Dressing Due to be Changed 07/23/22   Distal Patency/Care heparin locked   Proximal 1 Patency/Care heparin locked   Post-Hemodialysis Assessment   Rinseback Volume (mL) 250 mL   Blood Volume Processed (Liters) 36.1 L   Dialyzer Clearance Lightly streaked   Additional Fluid Intake (mL) 500 mL   Total UF (mL) 3000 mL   Net Fluid Removal 2500   Patient Response to Treatment c/o nausea   Post-Treatment Weight 76.7 kg (169 lb 1.5 oz)   Treatment Weight Change -2.5   Post-Hemodialysis Comments tx completed   Edema   Edema generalized

## 2022-07-16 NOTE — ASSESSMENT & PLAN NOTE
Patient's FSGs are uncontrolled on current medication regimen. Hyperglycemic from steroids  Will readjust basal insulin  Last A1c reviewed-   Lab Results   Component Value Date    HGBA1C 5.8 (H) 05/15/2022     Most recent fingerstick glucose reviewed-   Recent Labs   Lab 07/15/22  1641 07/15/22  2145 07/16/22  0730 07/16/22  1115   POCTGLUCOSE 216* 262* 151* 102     Current correctional scale  Medium  Increase anti-hyperglycemic dose as follows-   Antihyperglycemics (From admission, onward)            Start     Stop Route Frequency Ordered    07/14/22 0915  insulin detemir U-100 pen 15 Units         -- SubQ 2 times daily 07/14/22 0908    07/13/22 1645  insulin aspart U-100 pen 10 Units         -- SubQ 3 times daily with meals 07/13/22 1637    07/07/22 1719  insulin aspart U-100 pen 1-10 Units         -- SubQ Before meals & nightly PRN 07/07/22 1619        Hold Oral hypoglycemics while patient is in the hospital.

## 2022-07-16 NOTE — SUBJECTIVE & OBJECTIVE
Interval History:  Patient currently complaining of severe abdominal pain patient's diet changed to clear liquids due to nausea.  Cleared by Nephrology for discharge blood pressure running high blood pressure medications readjusted    Review of Systems   Constitutional:  Positive for fatigue. Negative for chills.   HENT:  Negative for sore throat.    Respiratory:  Negative for cough and shortness of breath.    Gastrointestinal:  Negative for abdominal pain, nausea and vomiting.   Genitourinary:  Negative for decreased urine volume.   Musculoskeletal:  Negative for back pain and neck pain.   Skin:  Positive for pallor.   Neurological:  Positive for weakness. Negative for seizures and headaches.   Psychiatric/Behavioral:  Negative for behavioral problems.    Objective:     Vital Signs (Most Recent):  Temp: 97.6 °F (36.4 °C) (07/16/22 1500)  Pulse: 105 (07/16/22 1500)  Resp: 18 (07/16/22 1500)  BP: (!) 163/89 (07/16/22 1500)  SpO2: 96 % (07/16/22 1500)   Vital Signs (24h Range):  Temp:  [97.1 °F (36.2 °C)-98.8 °F (37.1 °C)] 97.6 °F (36.4 °C)  Pulse:  [] 105  Resp:  [15-20] 18  SpO2:  [95 %-100 %] 96 %  BP: (142-207)/() 163/89     Weight: 79.2 kg (174 lb 9.7 oz)  Body mass index is 31.94 kg/m².    Intake/Output Summary (Last 24 hours) at 7/16/2022 1621  Last data filed at 7/16/2022 1215  Gross per 24 hour   Intake 1060 ml   Output 3000 ml   Net -1940 ml      Physical Exam  Vitals and nursing note reviewed.   Constitutional:       General: She is not in acute distress.     Appearance: She is well-developed. She is not ill-appearing or diaphoretic.      Comments: Anasarca.   HENT:      Head: Normocephalic and atraumatic.      Right Ear: External ear normal.      Left Ear: External ear normal.      Nose: Nose normal.      Mouth/Throat:      Mouth: Mucous membranes are moist.   Eyes:      General: No scleral icterus.     Extraocular Movements: Extraocular movements intact.      Conjunctiva/sclera: Conjunctivae  normal.      Pupils: Pupils are equal, round, and reactive to light.   Neck:      Vascular: No JVD.   Cardiovascular:      Rate and Rhythm: Normal rate and regular rhythm.      Pulses: Normal pulses.      Heart sounds: Normal heart sounds. No murmur heard.    No friction rub. No gallop.   Pulmonary:      Effort: Pulmonary effort is normal. No respiratory distress.      Breath sounds: Normal breath sounds. No wheezing or rales.   Abdominal:      General: Bowel sounds are normal. There is no distension.      Palpations: Abdomen is soft.      Tenderness: There is abdominal tenderness. There is no guarding or rebound.   Genitourinary:     Comments: Pelayo.  Musculoskeletal:         General: No tenderness. Normal range of motion.      Cervical back: Neck supple.      Right lower leg: No edema.      Left lower leg: No edema.   Skin:     General: Skin is warm and dry.      Coloration: Skin is not pale.      Findings: No erythema or rash.   Neurological:      Mental Status: She is oriented to person, place, and time.      Cranial Nerves: No cranial nerve deficit.      Motor: No weakness.   Psychiatric:         Mood and Affect: Mood normal.         Behavior: Behavior normal.       Significant Labs: All pertinent labs within the past 24 hours have been reviewed.  CBC:   Recent Labs   Lab 07/15/22  0445 07/16/22  0440   WBC 8.29 12.48   HGB 10.0* 10.2*   HCT 28.3* 27.8*    164     CMP:   Recent Labs   Lab 07/15/22  0445 07/16/22  0440   * 134*   K 4.2 3.6    102   CO2 22* 20*   * 159*   BUN 51* 49*   CREATININE 3.7* 3.5*   CALCIUM 8.0* 7.8*   PROT 5.1* 4.8*   ALBUMIN 1.8* 1.8*   BILITOT 0.5 0.6   ALKPHOS 93 86   AST 10 15   ALT 11 16   ANIONGAP 10 12   EGFRNONAA 15* 16*       Significant Imaging: I have reviewed all pertinent imaging results/findings within the past 24 hours.

## 2022-07-16 NOTE — SUBJECTIVE & OBJECTIVE
Interval History: , no new complains. HD in AM. BGs better, No acute events overnight. Bp running high    Review of Systems   Constitutional:  Positive for fatigue. Negative for chills.   HENT:  Negative for sore throat.    Respiratory:  Negative for cough and shortness of breath.    Gastrointestinal:  Negative for abdominal pain, nausea and vomiting.   Genitourinary:  Negative for decreased urine volume.   Musculoskeletal:  Negative for back pain and neck pain.   Skin:  Positive for pallor.   Neurological:  Positive for weakness. Negative for seizures and headaches.   Psychiatric/Behavioral:  Negative for behavioral problems.    Objective:     Vital Signs (Most Recent):  Temp: 97.6 °F (36.4 °C) (07/16/22 1500)  Pulse: 105 (07/16/22 1500)  Resp: 18 (07/16/22 1500)  BP: (!) 163/89 (07/16/22 1500)  SpO2: 96 % (07/16/22 1500)   Vital Signs (24h Range):  Temp:  [97.1 °F (36.2 °C)-98.8 °F (37.1 °C)] 97.6 °F (36.4 °C)  Pulse:  [] 105  Resp:  [15-20] 18  SpO2:  [95 %-100 %] 96 %  BP: (142-207)/() 163/89     Weight: 79.2 kg (174 lb 9.7 oz)  Body mass index is 31.94 kg/m².    Intake/Output Summary (Last 24 hours) at 7/16/2022 1619  Last data filed at 7/16/2022 1215  Gross per 24 hour   Intake 1060 ml   Output 3000 ml   Net -1940 ml      Physical Exam  Vitals and nursing note reviewed.   Constitutional:       General: She is not in acute distress.     Appearance: She is well-developed. She is ill-appearing. She is not diaphoretic.      Comments: Anasarca.   HENT:      Head: Normocephalic and atraumatic.      Right Ear: External ear normal.      Left Ear: External ear normal.      Nose: Nose normal.      Mouth/Throat:      Mouth: Mucous membranes are moist.   Eyes:      General: No scleral icterus.     Extraocular Movements: Extraocular movements intact.      Conjunctiva/sclera: Conjunctivae normal.      Pupils: Pupils are equal, round, and reactive to light.   Neck:      Vascular: No JVD.   Cardiovascular:       Rate and Rhythm: Normal rate and regular rhythm.      Pulses: Normal pulses.      Heart sounds: Normal heart sounds. No murmur heard.    No friction rub. No gallop.   Pulmonary:      Effort: Pulmonary effort is normal. No respiratory distress.      Breath sounds: Normal breath sounds. No wheezing or rales.   Abdominal:      General: Bowel sounds are normal. There is no distension.      Palpations: Abdomen is soft.      Tenderness: There is no abdominal tenderness. There is no guarding or rebound.   Genitourinary:     Comments: Pelayo.  Musculoskeletal:         General: No tenderness. Normal range of motion.      Cervical back: Neck supple.      Right lower leg: Edema present.      Left lower leg: Edema present.   Skin:     General: Skin is warm and dry.      Coloration: Skin is not pale.      Findings: No erythema or rash.   Neurological:      Mental Status: She is oriented to person, place, and time.      Cranial Nerves: No cranial nerve deficit.      Motor: No weakness.   Psychiatric:         Mood and Affect: Mood normal.         Behavior: Behavior normal.       Significant Labs: All pertinent labs within the past 24 hours have been reviewed.  BMP:   Recent Labs   Lab 07/16/22  0440   *   *   K 3.6      CO2 20*   BUN 49*   CREATININE 3.5*   CALCIUM 7.8*     CBC:   Recent Labs   Lab 07/15/22  0445 07/16/22  0440   WBC 8.29 12.48   HGB 10.0* 10.2*   HCT 28.3* 27.8*    164       Significant Imaging: I have reviewed all pertinent imaging results/findings within the past 24 hours.

## 2022-07-16 NOTE — NURSING
Earlier /102; labetalol IVP Q 6 hrs given 4 hrs ago for /102. Reported to Dialysis earlier..     C/O Pain given Hydrocodone- acetaminophen 5-325. Pt vomited 50 cc of grey stuff w poss cereal matter. Stated feel better now.    Reported to Dr Baires. Instructed to give Nausea med, home meds; he will increase, recheck BP, not give Labetalol. Will continue w current PO pain med.

## 2022-07-16 NOTE — NURSING
Patient gagging but not vomiting verbalizes nauseated but repuests a hamburger.    Patient refuses 250cc NS IV bolus states Dr Clark did not come in her room and informed her of the saline bolus.

## 2022-07-16 NOTE — PROGRESS NOTES
"INPATIENT NEPHROLOGY PROGRESS  [facility]    Patient Name: Tabby Howard  Date: 2022    Reason for consultation: KANWAL    Chief Complaint:   Chief Complaint   Patient presents with    Abnormal Lab     History of Present Illness:  32 female with a PMHx of DM II, supraventricular tachycardia, heart failure with preserved ejection fraction (22), Sickle cell trait, and CKD (stage IV) who states that she came to the ED because of abnormal labs. She has been having shortness of breathness, feeling lethargic and generalized weakness for few weeks but has been feeling worse.  Patient had lab work done this morning and was informed of abnormal "kidney" results with referral to the ED for further evaluation and work up. She endorses diffuse back pain over the past month, intermittent chills as well as diarrhea a few days ago that has since improved. Patient specifies she is able to urinate but notes that the volume has decreased. Also states that her leg swelling has been getting worse due to her being "full of fluid."  LMP was in 2021. The patient denies fever, N/V, abdominal pain on exam, CP or any other symptoms at this time. PSHx includes EGD and . She was seen by our group during her last hospitalization and was scheduled for f/u with Dr. BATRES next week- previsit labs showed severe anemia and metabolic derangements so she was sent into the hospital.      JUAN A neg, UA with 3+ protein, 1+ blood  - UPCR 10.8    Admit CT AP:  Interval development of right pleural effusion.  Stable prominent pericardial effusion.  Interstitial prominence raising question of pulmonary edema.  Stable abdomen and pelvis with ascites, anasarca.    Interval History:  - hypertensive emergency, Hb 4, K 6.4, CO2 15 in the ER- spoke with ER physician and advised trialysis cath placement for emergent HD with blood transfusion- did HD overnight, started NTG gtt for BP control- switched to " nicardipine gtt overnight- got 2u of blood- [place] placed- oliguric, BP better, clearance parameters are better  7/9  Hgb improving, 7.8, post transfusion.  K+ at goal. Appreciate heme-onc input.  Pt fatigued, not conversing much.  No complaints.  Dialysis catheter to be replaced today, pt to have HD following.  7/10  Trialysis eval by gen surgery yesterday, line ok to continue to use--then pt pulled catheter out herself (per RN report), so new line was placed.  Had HD last night, 4L net fluid removal.  To have tunneled catheter placed tomorrow.  136cc UOP recorded.  Off cardene gtt, pressures stable thus far--improving w/fluid removal.    7/11 VSS. UO not great. HD today. Getting tunneled HD cath a well.  7/12 VSS, plan for tunneled cath by Dr. Gan. HD in AM. ? Blood transfusion - agree with Heme/Onc to transfuse if Hgb < 7, rather than more traditional goals. Will prefer to do so with HD.  7/13 VSS. Had tunneled HD cath place by Dr. Gan on 7/12. Seen on HD today. Additional 500mg Keppra After HD on HD days.  7/14 VSS, no new complains. HD in AM. BGs better, agree with steroid taper per Heme/Onc.  7/15 VSS. Tunneled cath not working - never used for HD since placement. Had to do HD via Eugene. Doing cathflo and will attempt HD in AM. If not working, will need revision by surgery before discharge. Keep her here for now. Keep Eugene line in place.  7/16 had HD using TDC and lines reversed but worked, removed 2.5L- came back from HD and c/o nausea and pain, trying to make herself throw up, asks for hamburger in between moaning, says she cannot be discharged- concern for malingering to avoid discharge- I did order 250cc NS bolus but patient declined- defer nausea, pain management to hospitalist- advised nurse to remove eugene    Plan of Care:    KANWAL/CKD V- likely now ESRD- history of uncontrolled DMII- Dialysis dependent since 7/7  Nephrotic syndrome with ansaraca; History of microscopic hematuria  HTN  emergency/D CHF/[place] pulm edema/Pericardial effusion/Pleural effusion  Secondary HPT  Hemolytic anemia/Thrombocytopenia; likely component of Anemia of CKD as well    Plan:    - Initiated RRT on 7/7, tunneled cath placed 7/12 - worked today by reversing lines. Remove eugene. HD MWF for now. Has outpatient dialysis scheduled for Monday. May plan for outpatient renal biopsy.  - Adjust oral BP medication and diuretics as needed. UF as tolerated. Renal diet with a 1.5L fluid restriction.   - Hgb is improved after 2u of blood- heme/onc input apprecaited. Plan to transfuse for Hgb < 7 and steroid taper (will help BGs too). Continue SHELLEY with HD.    Thank you for allowing us to participate in this patient's care. We will continue to follow.    Vital Signs:  Temp Readings from Last 3 Encounters:   07/16/22 97.1 °F (36.2 °C)   06/11/22 98.2 °F (36.8 °C) (Oral)   06/07/22 98.1 °F (36.7 °C) (Axillary)       Pulse Readings from Last 3 Encounters:   07/16/22 100   06/11/22 100   06/07/22 110       BP Readings from Last 3 Encounters:   07/16/22 (!) 152/93   06/11/22 (!) 173/105   06/07/22 129/81       Weight:  Wt Readings from Last 3 Encounters:   07/16/22 79.2 kg (174 lb 9.7 oz)   06/11/22 73.9 kg (163 lb)   06/07/22 74.2 kg (163 lb 9.3 oz)       INS/OUTS:  I/O last 3 completed shifts:  In: 1220 [P.O.:720; Other:500]  Out: 3001 [Urine:1; Other:3000]  I/O this shift:  In: 200 [P.O.:200]  Out: -     Past Medical & Surgical History:  Past Medical History:   Diagnosis Date    CKD (chronic kidney disease), stage IV 4/12/2022    Diabetes mellitus due to underlying condition with unspecified complications 4/12/2022    Gastroparesis 4/12/2022    Heart failure with preserved ejection fraction 4/12/2022    EF 55% on 3/22    History of gastroesophageal reflux (GERD)     History of supraventricular tachycardia     Hyperkalemia 7/7/2022    Sickle cell trait 4/12/2022    Type 2 diabetes mellitus        Past Surgical History:    Procedure Laterality Date     SECTION      x 3    ESOPHAGOGASTRODUODENOSCOPY N/A 10/18/2019    Procedure: ESOPHAGOGASTRODUODENOSCOPY (EGD);  Surgeon: Gianluca Mendez MD;  Location: [facility];  Service: Endoscopy;  Laterality: N/A;    PLACEMENT OF DUAL-LUMEN VASCULAR CATHETER Left 2022    Procedure: INSERTION-CATHETER-JOSEPH;  Surgeon: Dionte Gan MD;  [facility];  Service: General;  Laterality: Left;       Past Social History:  Social History     Socioeconomic History    Marital status:    Tobacco Use    Smoking status: Never Smoker    Smokeless tobacco: Never Used   Substance and Sexual Activity    Alcohol use: No    Drug use: No    Sexual activity: Not Currently     Partners: Male     Birth control/protection: I.U.D.       Medications:  Scheduled Meds:   sodium chloride 0.9%   Intravenous Once    amLODIPine  10 mg Oral Daily    epoetin teresa-epbx  10,000 Units Intravenous Every Mon, Wed, Fri    famotidine  20 mg Oral Daily    heparin (porcine)  4,000 Units Intra-Catheter Once    insulin aspart U-100  10 Units Subcutaneous TIDWM    insulin detemir U-100  15 Units Subcutaneous BID    [START ON 2022] isosorbide mononitrate  120 mg Oral Daily    levETIRAcetam  500 mg Oral BID    levETIRAcetam  500 mg Oral Every Mon, Wed, Fri    predniSONE  60 mg Oral Daily     Continuous Infusions:   sodium chloride 0.9% 10 mL/hr at 22 1250     PRN Meds:.acetaminophen-codeine 300-30mg, dextrose 10%, dextrose 10%, dextrose 10%, glucagon (human recombinant), glucose, glucose, heparin (porcine), HYDROcodone-acetaminophen, insulin aspart U-100, labetaloL, melatonin, ondansetron, sodium chloride 0.9%  No current facility-administered medications on file prior to encounter.     Current Outpatient Medications on File Prior to Encounter   Medication Sig Dispense Refill    FLUoxetine 20 MG capsule Take 1 capsule (20 mg total) by mouth once daily. 30 capsule 1    LANTUS U-100  "INSULIN 100 unit/mL injection SMARTSI Unit(s) SUB-Q Every Morning      levETIRAcetam (KEPPRA) 500 MG Tab Take 1 tablet (500 mg total) by mouth 2 (two) times daily. 60 tablet 1    [DISCONTINUED] torsemide (DEMADEX) 20 MG Tab Take 1 tablet (20 mg total) by mouth 2 (two) times a day. (Patient taking differently: Take 20 mg by mouth once daily.) 60 tablet 1    [DISCONTINUED] furosemide (LASIX) 40 MG tablet Take 1 tablet (40 mg total) by mouth 2 (two) times daily. 60 tablet 0    [DISCONTINUED] pantoprazole (PROTONIX) 40 MG tablet Take 1 tablet (40 mg total) by mouth once daily. 30 tablet 3       Allergies:  Penicillins    Past Family History:  Reviewed; refer to Hospitalist Admission Note    Review of Systems:  Review of Systems - All 14 systems reviewed and negative, except as noted in HPI    Physical Exam:  BP (!) 152/93   Pulse 100   Temp 97.1 °F (36.2 °C)   Resp 18   Ht 5' 2" (1.575 m)   Wt 79.2 kg (174 lb 9.7 oz)   LMP 2021 (Approximate)   SpO2 97%   Breastfeeding No   BMI 31.94 kg/m²     Constitutional: nad, aao x 3  Heart: rrr, no m/r/g, wwp, edema better  Lungs: ctab, no w/r/r/c, no lb  Abdomen: s/nt/nd, +BS    Results:  Recent Labs   Lab 07/14/22  0429 07/15/22  0445 22  0440   * 132* 134*   K 4.0 4.2 3.6   CL 99 100 102   CO2 22* 22* 20*   BUN 32* 51* 49*   CREATININE 3.2* 3.7* 3.5*   ESTGFRAFRICA 21* 18* 19*   EGFRNONAA 18* 15* 16*   * 240* 159*       Recent Labs   Lab 07/14/22  0429 07/15/22  0445 22  0440   CALCIUM 8.0* 8.0* 7.8*   ALBUMIN 1.8* 1.8* 1.8*       Recent Labs   Lab 22  0516   PTH, Intact 289.8 H       Recent Labs   Lab 22  0714 22  1153 22  1627 22  2154 07/15/22  0756 07/15/22  1134 07/15/22  1641 07/15/22  2145 22  0730 22  1115   POCTGLUCOSE 397* 344* 262* 296* 222* 160* 216* 262* 151* 102       Recent Labs   Lab 22  0240 22  1135 05/15/22  1954   Hemoglobin A1C 6.8 H 5.3 5.8 H       Recent " Labs   Lab 07/14/22  0429 07/15/22  0445 07/16/22  0440   WBC 7.38 8.29 12.48   HGB 9.2* 10.0* 10.2*   HCT 25.5* 28.3* 27.8*    169 164   MCV 80* 81* 80*   MCHC 36.1* 35.3 36.7*   MONO 5.7  0.4 3.9*  0.3 12.1  1.5*       Recent Labs   Lab 07/14/22  0429 07/15/22  0445 07/16/22  0440   BILITOT 0.5 0.5 0.6   PROT 5.4* 5.1* 4.8*   ALBUMIN 1.8* 1.8* 1.8*   ALKPHOS 101 93 86   ALT 13 11 16   AST 10 10 15       Recent Labs   Lab 05/28/22  2036 06/11/22  1115 07/07/22  0912   Color, UA Yellow Yellow Yellow   Appearance, UA Clear Clear Clear   pH, UA 7.0 7.0 6.0   Specific [place], UA 1.020 1.015 1.020   Protein, UA 3+ A 3+ A 3+ A   Glucose, UA 2+ A 2+ A Negative   Ketones, UA Negative Negative Negative   Urobilinogen, UA Negative Negative Negative   Bilirubin (UA) Negative Negative Negative   Occult Blood UA 2+ A 1+ A 2+ A   Nitrite, UA Negative Negative Negative   RBC, UA 5 H 3 3   WBC, UA 1 4 8 H   Bacteria None Occasional Rare   Hyaline Casts, UA 0 0 0       Recent Labs   Lab 12/13/20  1042 04/23/21  1952 04/23/21 2006 04/11/22 2043 04/11/22  2345 04/12/22  0211   POC PH 7.457 H 7.458 H  --  7.365  --   --    POC PCO2 32.6 L 32.9 L  --  39.5  --   --    POC HCO3 23.0 L 23.3 L  --  22.6 L  --   --    POC PO2 26 LL 24 LL  --  25 L  --   --    POC SATURATED O2 53 L 47 L  --  42 L  --   --    POC BE -1 -1  --  -3  --   --    Sample VENOUS VENOUS   < > VENOUS VENOUS VENOUS    < > = values in this interval not displayed.     I have spent > 35 minutes providing care for this patient for the above diagnoses. These services have included chart/data/imaging review, evaluation, exam, formulation of plan, , note preparation, and discussions with staff involved in this patient's care.    Prateek Clark MD    [facility]  [address] [place] [id number]  [phone] (p)  [phone] (f)

## 2022-07-16 NOTE — PLAN OF CARE
POC reviewed verbalizes understanding, Ax O x 4, N/V medicated w prn good results, Pain medicated w moderate results, BP elevated given additional Isorbide, Changed to clear liquids due to abdominal pain, KUB done waiting results, Dialysis done to insure patency of chest wall trialysis. Tele [id number] NSR, BG monitored. BG low given 2 apple juice and pudding to elevate. Bed low and locked, belongings in reach.

## 2022-07-16 NOTE — NURSING
Notified by tech of abnormal BP obtained on monitor of 164/105, . Rechecked BP manually and obtained reading of 182/102, MAP of 129. Charge nurse Keanu made aware. 10mg IV labetalol given by SHAILA Colunga per PRN orders for SBP >180.

## 2022-07-16 NOTE — PROGRESS NOTES
"[facility] Medicine  Progress Note    Patient Name: Tabby Howard  MRN: [id number]  Patient Class: IP- Inpatient   Admission Date: 2022  Length of Stay: 9 days  Attending Physician: Keegan Cody MD  Primary Care Provider: Primary Doctor No        Subjective:     Principal Problem:Acute renal failure superimposed on chronic kidney disease        HPI:  Tabby Howard is a 32 y.o. female with a PMHx of DM II, supraventricular tachycardia, heart failure with preserved ejection fraction (22), Sickle cell trait, and CKD (stage IV) who states that she came to the ED because of abnormal labs. She has been having shortness of breathness, feeling lethargic and generalized weakness for few weeks but has been feeling worse.  Patient had lab work done this morning and was informed of abnormal "kidney" results with referral to the ED for further evaluation and work up.  She endorses diffuse back pain over the past month, intermittent chills as well as diarrhea a few days ago that has since improved.  Patient specifies she is able to urinate but notes that the volume has decreased.  Also states that her  leg swelling has been getting worse due to her being "full of fluid."  LMP was in 2021.  The patient denies fever, N/V, abdominal pain on exam, CP or any other symptoms at this time.  PSHx includes EGD and .  Does not see a nephrologist for her CKD     The history is provided by the patient.          Overview/Hospital Course:  Patient was admitted for acute on chronic kidney disease with hyperkalemia and acidosis.  Patient was also found to be in hypertensive emergency.  On arrival patient was anemic Hb 4. Patient was transfused 2 units of blood with her new dialysis.  On arrival K 6.4, CO2 15. Nephrology was consulted patient was started on hemodialysis after dialysis access was placed.  For blood pressure control started NTG gtt and when blood pressure was not " controlled with nitro drip patient was switched to nicardipine gtt patient continued to be oliguric, hemoglobin improved after cut transition, hematology was consulted for further evaluation of anemia.  Workup showed hemolytic anemia which is attributed hemolysis during hypertensive emergency.  Patient's potassium improved acidosis improved after hemodialysis.  Tunneled catheter was placed 7/12. She was started on IV steroid by hematology, after which insulin had to be increased for hyperglycemia.       Interval History:  Patient currently complaining of severe abdominal pain patient's diet changed to clear liquids due to nausea.  Cleared by Nephrology for discharge blood pressure running high blood pressure medications readjusted    Review of Systems   Constitutional:  Positive for fatigue. Negative for chills.   HENT:  Negative for sore throat.    Respiratory:  Negative for cough and shortness of breath.    Gastrointestinal:  Negative for abdominal pain, nausea and vomiting.   Genitourinary:  Negative for decreased urine volume.   Musculoskeletal:  Negative for back pain and neck pain.   Skin:  Positive for pallor.   Neurological:  Positive for weakness. Negative for seizures and headaches.   Psychiatric/Behavioral:  Negative for behavioral problems.    Objective:     Vital Signs (Most Recent):  Temp: 97.6 °F (36.4 °C) (07/16/22 1500)  Pulse: 105 (07/16/22 1500)  Resp: 18 (07/16/22 1500)  BP: (!) 163/89 (07/16/22 1500)  SpO2: 96 % (07/16/22 1500)   Vital Signs (24h Range):  Temp:  [97.1 °F (36.2 °C)-98.8 °F (37.1 °C)] 97.6 °F (36.4 °C)  Pulse:  [] 105  Resp:  [15-20] 18  SpO2:  [95 %-100 %] 96 %  BP: (142-207)/() 163/89     Weight: 79.2 kg (174 lb 9.7 oz)  Body mass index is 31.94 kg/m².    Intake/Output Summary (Last 24 hours) at 7/16/2022 1621  Last data filed at 7/16/2022 1215  Gross per 24 hour   Intake 1060 ml   Output 3000 ml   Net -1940 ml      Physical Exam  Vitals and nursing note reviewed.    Constitutional:       General: She is not in acute distress.     Appearance: She is well-developed. She is not ill-appearing or diaphoretic.      Comments: Anasarca.   HENT:      Head: Normocephalic and atraumatic.      Right Ear: External ear normal.      Left Ear: External ear normal.      Nose: Nose normal.      Mouth/Throat:      Mouth: Mucous membranes are moist.   Eyes:      General: No scleral icterus.     Extraocular Movements: Extraocular movements intact.      Conjunctiva/sclera: Conjunctivae normal.      Pupils: Pupils are equal, round, and reactive to light.   Neck:      Vascular: No JVD.   Cardiovascular:      Rate and Rhythm: Normal rate and regular rhythm.      Pulses: Normal pulses.      Heart sounds: Normal heart sounds. No murmur heard.    No friction rub. No gallop.   Pulmonary:      Effort: Pulmonary effort is normal. No respiratory distress.      Breath sounds: Normal breath sounds. No wheezing or rales.   Abdominal:      General: Bowel sounds are normal. There is no distension.      Palpations: Abdomen is soft.      Tenderness: There is abdominal tenderness. There is no guarding or rebound.   Genitourinary:     Comments: Pel[place]o.  Musculoskeletal:         General: No tenderness. Normal range of motion.      Cervical back: Neck supple.      Right lower leg: No edema.      Left lower leg: No edema.   Skin:     General: Skin is warm and dry.      Coloration: Skin is not pale.      Findings: No erythema or rash.   Neurological:      Mental Status: She is oriented to person, place, and time.      Cranial Nerves: No cranial nerve deficit.      Motor: No weakness.   Psychiatric:         Mood and Affect: Mood normal.         Behavior: Behavior normal.       Significant Labs: All pertinent labs within the past 24 hours have been reviewed.  CBC:   Recent Labs   Lab 07/15/22  0445 07/16/22  0440   WBC 8.29 12.48   HGB 10.0* 10.2*   HCT 28.3* 27.8*    164     CMP:   Recent Labs   Lab 07/15/22  0445  07/16/22  0440   * 134*   K 4.2 3.6    102   CO2 22* 20*   * 159*   BUN 51* 49*   CREATININE 3.7* 3.5*   CALCIUM 8.0* 7.8*   PROT 5.1* 4.8*   ALBUMIN 1.8* 1.8*   BILITOT 0.5 0.6   ALKPHOS 93 86   AST 10 15   ALT 11 16   ANIONGAP 10 12   EGFRNONAA 15* 16*       Significant Imaging: I have reviewed all pertinent imaging results/findings within the past 24 hours.      Assessment/Plan:      * Acute renal failure superimposed on chronic kidney disease  New HD  Follow renal panel and electrolytes closely.  Follow renal recommendations.  CT r/o renal stone  Adjust renal dose medications for creatinine clearance 10-50cc/min.  Avoid NSAIDs, [place]-II inhibitors, ACE-I, Angiotensin Receptor Blockers, or Aminoglycosides.  Urine analysis.  Will defer following workup to nephro   JUAN A, C3, C4, ESR, UPEP and SPEP  Tunneled cath 7/12                  Anemia  Patient's anemia is currently controlled. Has recieved 2 units of PRBCs on 7/7. Etiology likely d/t Anemia of acute illness + Hemolytic anemia  Retic count elevated   Will cont to trend HnH   Current CBC reviewed-   Lab Results   Component Value Date    HGB 9.1 (L) 07/13/2022    HCT 25.5 (L) 07/13/2022     Monitor serial CBC and transfuse if patient becomes hemodynamically unstable, symptomatic or H/H drops below 7/21.   7/11 Transfuse 1 unit PRBC  On IV steroids per hematology      Pericardial effusion  ECHO shows · Moderate circumferential pericardial effusion.     The left ventricle is normal in size with moderate concentric hypertrophy and low normal systolic function.  · The estimated ejection fraction is 50%.  · Normal left ventricular diastolic function.  · Normal right ventricular size with normal right ventricular systolic function.          Seizure  Hx noted      Sickle cell trait  Hx noted        CKD (chronic kidney disease), stage IV  Hx noted      Anemia of chronic kidney failure  Hx noted  Ml 2/2  hypertensive emergency and acute illness   S/p  2  units   Patient's anemia is currently controlled. Has recieved 2 units of PRBCs on 7/7.   Current CBC reviewed-   Lab Results   Component Value Date    HGB 9.1 (L) 07/13/2022    HCT 25.5 (L) 07/13/2022     Monitor serial CBC and transfuse if patient becomes hemodynamically unstable, symptomatic or H/H drops below 7/21.         Type II diabetes mellitus     Patient's FSGs are uncontrolled on current medication regimen. Hyperglycemic from steroids  Will readjust basal insulin  Last A1c reviewed-   Lab Results   Component Value Date    HGBA1C 5.8 (H) 05/15/2022     Most recent fingerstick glucose reviewed-   Recent Labs   Lab 07/15/22  1641 07/15/22  2145 07/16/22  0730 07/16/22  1115   POCTGLUCOSE 216* 262* 151* 102     Current correctional scale  Medium  Increase anti-hyperglycemic dose as follows-   Antihyperglycemics (From admission, onward)            Start     Stop Route Frequency Ordered    07/14/22 0915  insulin detemir U-100 pen 15 Units         -- SubQ 2 times daily 07/14/22 0908    07/13/22 1645  insulin aspart U-100 pen 10 Units         -- SubQ 3 times daily with meals 07/13/22 1637    07/07/22 1719  insulin aspart U-100 pen 1-10 Units         -- SubQ Before meals & nightly PRN 07/07/22 1619        Hold Oral hypoglycemics while patient is in the hospital.          VTE Risk Mitigation (From admission, onward)         Ordered     heparin (porcine) injection 4,000 Units  As needed (PRN)         07/09/22 2022     IP VTE HIGH RISK PATIENT  Once         07/09/22 1957     Place JOCELYNE hose  Until discontinued         07/09/22 1957     heparin (porcine) injection 4,000 Units  Once         07/08/22 1624                Discharge Planning   TATIANA: 7/15/2022     Code Status: Prior   Is the patient medically ready for discharge?:     Reason for patient still in hospital (select all that apply): Patient trending condition and Treatment  Discharge Plan A: Home with family                  Keegan Cody MD  Department of  Hospital Medicine [facility]

## 2022-07-17 VITALS
HEIGHT: 62 IN | HEART RATE: 105 BPM | OXYGEN SATURATION: 98 % | SYSTOLIC BLOOD PRESSURE: 168 MMHG | DIASTOLIC BLOOD PRESSURE: 105 MMHG | TEMPERATURE: 98 F | BODY MASS INDEX: 30.87 KG/M2 | WEIGHT: 167.75 LBS | RESPIRATION RATE: 18 BRPM

## 2022-07-17 LAB
ALBUMIN SERPL BCP-MCNC: 1.8 G/DL (ref 3.5–5.2)
ALP SERPL-CCNC: 82 U/L (ref 55–135)
ALT SERPL W/O P-5'-P-CCNC: 16 U/L (ref 10–44)
ANION GAP SERPL CALC-SCNC: 11 MMOL/L (ref 8–16)
AST SERPL-CCNC: 14 U/L (ref 10–40)
BASOPHILS # BLD AUTO: 0.01 K/UL (ref 0–0.2)
BASOPHILS NFR BLD: 0.1 % (ref 0–1.9)
BILIRUB SERPL-MCNC: 0.7 MG/DL (ref 0.1–1)
BUN SERPL-MCNC: 39 MG/DL (ref 6–20)
CALCIUM SERPL-MCNC: 7.8 MG/DL (ref 8.7–10.5)
CHLORIDE SERPL-SCNC: 101 MMOL/L (ref 95–110)
CO2 SERPL-SCNC: 22 MMOL/L (ref 23–29)
CREAT SERPL-MCNC: 3.2 MG/DL (ref 0.5–1.4)
DIFFERENTIAL METHOD: ABNORMAL
EOSINOPHIL # BLD AUTO: 0 K/UL (ref 0–0.5)
EOSINOPHIL NFR BLD: 0 % (ref 0–8)
ERYTHROCYTE [DISTWIDTH] IN BLOOD BY AUTOMATED COUNT: 14.6 % (ref 11.5–14.5)
EST. GFR  (AFRICAN AMERICAN): 21 ML/MIN/1.73 M^2
EST. GFR  (NON AFRICAN AMERICAN): 18 ML/MIN/1.73 M^2
GLUCOSE SERPL-MCNC: 166 MG/DL (ref 70–110)
HCT VFR BLD AUTO: 27.7 % (ref 37–48.5)
HGB BLD-MCNC: 9.8 G/DL (ref 12–16)
IMM GRANULOCYTES # BLD AUTO: 0.25 K/UL (ref 0–0.04)
IMM GRANULOCYTES NFR BLD AUTO: 2.5 % (ref 0–0.5)
LYMPHOCYTES # BLD AUTO: 0.5 K/UL (ref 1–4.8)
LYMPHOCYTES NFR BLD: 5.1 % (ref 18–48)
MCH RBC QN AUTO: 28.6 PG (ref 27–31)
MCHC RBC AUTO-ENTMCNC: 35.4 G/DL (ref 32–36)
MCV RBC AUTO: 81 FL (ref 82–98)
MONOCYTES # BLD AUTO: 0.4 K/UL (ref 0.3–1)
MONOCYTES NFR BLD: 4.4 % (ref 4–15)
NEUTROPHILS # BLD AUTO: 8.7 K/UL (ref 1.8–7.7)
NEUTROPHILS NFR BLD: 87.9 % (ref 38–73)
NRBC BLD-RTO: 0 /100 WBC
PLATELET # BLD AUTO: 157 K/UL (ref 150–450)
PMV BLD AUTO: 9.8 FL (ref 9.2–12.9)
POCT GLUCOSE: 198 MG/DL (ref 70–110)
POCT GLUCOSE: 234 MG/DL (ref 70–110)
POTASSIUM SERPL-SCNC: 4.5 MMOL/L (ref 3.5–5.1)
PROT SERPL-MCNC: 4.9 G/DL (ref 6–8.4)
RBC # BLD AUTO: 3.43 M/UL (ref 4–5.4)
SODIUM SERPL-SCNC: 134 MMOL/L (ref 136–145)
WBC # BLD AUTO: 9.85 K/UL (ref 3.9–12.7)

## 2022-07-17 PROCEDURE — 25000003 PHARM REV CODE 250: Performed by: HOSPITALIST

## 2022-07-17 PROCEDURE — 63600175 PHARM REV CODE 636 W HCPCS: Performed by: NURSE PRACTITIONER

## 2022-07-17 PROCEDURE — 25000003 PHARM REV CODE 250: Performed by: INTERNAL MEDICINE

## 2022-07-17 PROCEDURE — 85025 COMPLETE CBC W/AUTO DIFF WBC: CPT | Performed by: HOSPITALIST

## 2022-07-17 PROCEDURE — 94761 N-INVAS EAR/PLS OXIMETRY MLT: CPT

## 2022-07-17 PROCEDURE — 99232 PR SUBSEQUENT HOSPITAL CARE,LEVL II: ICD-10-PCS | Mod: ,,, | Performed by: INTERNAL MEDICINE

## 2022-07-17 PROCEDURE — 99232 SBSQ HOSP IP/OBS MODERATE 35: CPT | Mod: ,,, | Performed by: INTERNAL MEDICINE

## 2022-07-17 PROCEDURE — 80053 COMPREHEN METABOLIC PANEL: CPT | Performed by: HOSPITALIST

## 2022-07-17 PROCEDURE — 36415 COLL VENOUS BLD VENIPUNCTURE: CPT | Performed by: HOSPITALIST

## 2022-07-17 PROCEDURE — 63600175 PHARM REV CODE 636 W HCPCS: Performed by: INTERNAL MEDICINE

## 2022-07-17 RX ORDER — ATENOLOL 50 MG/1
50 TABLET ORAL EVERY OTHER DAY
Qty: 45 TABLET | Refills: 3 | Status: SHIPPED | OUTPATIENT
Start: 2022-07-17 | End: 2022-07-27

## 2022-07-17 RX ORDER — POLYETHYLENE GLYCOL 3350 17 G/17G
17 POWDER, FOR SOLUTION ORAL 2 TIMES DAILY
Qty: 510 EACH | Refills: 0 | Status: SHIPPED | OUTPATIENT
Start: 2022-07-17 | End: 2022-08-16

## 2022-07-17 RX ORDER — LACTULOSE 10 G/15ML
20 SOLUTION ORAL 3 TIMES DAILY
Status: DISCONTINUED | OUTPATIENT
Start: 2022-07-17 | End: 2022-07-17 | Stop reason: HOSPADM

## 2022-07-17 RX ORDER — ATENOLOL 25 MG/1
50 TABLET ORAL EVERY OTHER DAY
Status: DISCONTINUED | OUTPATIENT
Start: 2022-07-17 | End: 2022-07-17 | Stop reason: HOSPADM

## 2022-07-17 RX ADMIN — ATENOLOL 50 MG: 25 TABLET ORAL at 09:07

## 2022-07-17 RX ADMIN — INSULIN ASPART 10 UNITS: 100 INJECTION, SOLUTION INTRAVENOUS; SUBCUTANEOUS at 11:07

## 2022-07-17 RX ADMIN — AMLODIPINE BESYLATE 10 MG: 5 TABLET ORAL at 07:07

## 2022-07-17 RX ADMIN — LEVETIRACETAM 500 MG: 500 TABLET, FILM COATED ORAL at 07:07

## 2022-07-17 RX ADMIN — ISOSORBIDE MONONITRATE 120 MG: 30 TABLET, EXTENDED RELEASE ORAL at 07:07

## 2022-07-17 RX ADMIN — FAMOTIDINE 20 MG: 20 TABLET ORAL at 07:07

## 2022-07-17 RX ADMIN — PREDNISONE 60 MG: 20 TABLET ORAL at 07:07

## 2022-07-17 RX ADMIN — LACTULOSE 20 G: 20 SOLUTION ORAL at 10:07

## 2022-07-17 RX ADMIN — INSULIN ASPART 4 UNITS: 100 INJECTION, SOLUTION INTRAVENOUS; SUBCUTANEOUS at 11:07

## 2022-07-17 RX ADMIN — PROCHLORPERAZINE EDISYLATE 10 MG: 5 INJECTION INTRAMUSCULAR; INTRAVENOUS at 06:07

## 2022-07-17 RX ADMIN — INSULIN ASPART 10 UNITS: 100 INJECTION, SOLUTION INTRAVENOUS; SUBCUTANEOUS at 10:07

## 2022-07-17 NOTE — NURSING
Right IJ trialysis removed at this time per Dr. Clark. Patient was positioned laying flat.  Suture was removed from skin.  Manual pressure was held with 4x4 petroleum gauze and gauze for 5 minutes.  Pressure dressing applied to site.  No bleeding noted.  Patient slept through removal.

## 2022-07-17 NOTE — PROGRESS NOTES
"HPI      Interval History: blood sugars high but improved with with steroids taper.     32 y.o. female with a PMHx of DM II, supraventricular tachycardia, heart failure with preserved ejection fraction (22), Sickle cell trait, and CKD (stage IV) sent to the ED because of abnormal labs.   c/o shortness of breathness, feeling lethargic and generalized weakness for few weeks but has been feeling worse.   c/o diffuse back pain over the past month, intermittent chills as well as diarrhea a few days ago that has since improved.  Patient specifies she is able to urinate but notes that the volume has decreased.  Also states that her  leg swelling has been getting worse due to her being "full of fluid."  LMP was in 2021.  per med rec.  Patient reports she had irregular menstrual cycles up until December.  She has been receiving intermittent blood transfusions  hospital since December     Oncology Consult:  We have been consulted for severe anemia.  On arrival she was noted to have a hemoglobin of 4.9.  She is now status post 2 units packed red blood cell.  Patient reports she seen a hematologist years ago and was told she had sickle cell trait.       Past Medical History:   Diagnosis Date    CKD (chronic kidney disease), stage IV 2022    Diabetes mellitus due to underlying condition with unspecified complications 2022    Gastroparesis 2022    Heart failure with preserved ejection fraction 2022    EF 55% on 3/22    History of gastroesophageal reflux (GERD)     History of supraventricular tachycardia     Hyperkalemia 2022    Hypertensive emergency 2022    Sickle cell trait 2022    Type 2 diabetes mellitus      Past Surgical History:   Procedure Laterality Date     SECTION      x 3    ESOPHAGOGASTRODUODENOSCOPY N/A 10/18/2019    Procedure: ESOPHAGOGASTRODUODENOSCOPY (EGD);  Surgeon: Gianluca Mendez MD;  Location: [facility];  Service: Endoscopy;  Laterality: " N/A;    PLACEMENT OF DUAL-LUMEN VASCULAR CATHETER Left 7/12/2022    Procedure: INSERTION-CATHETER-JOSEPH;  Surgeon: Dionte Gan MD;  Location: [facility];  Service: General;  Laterality: Left;     Social History     Socioeconomic History    Marital status:    Tobacco Use    Smoking status: Never Smoker    Smokeless tobacco: Never Used   Substance and Sexual Activity    Alcohol use: No    Drug use: No    Sexual activity: Not Currently     Partners: Male     Birth control/protection: I.U.D.     Review of patient's allergies indicates:   Allergen Reactions    Penicillins Hives       Physical exam    Vitals:    07/17/22 0717   BP:    Pulse: 95   Resp: 15   Temp:      Right central catheter neck  Patient awake alert no acute distress laying comfortably in hospital bed   Pelayo  Bilateral lower extremity edema  Skin warm and dry  Normal mood  Normal respiratory effort  Tachycardic    Lab Results   Component Value Date    WBC 9.85 07/17/2022    HGB 9.8 (L) 07/17/2022    HCT 27.7 (L) 07/17/2022    MCV 81 (L) 07/17/2022     07/17/2022       CMP  Sodium   Date Value Ref Range Status   07/17/2022 134 (L) 136 - 145 mmol/L Final     Potassium   Date Value Ref Range Status   07/17/2022 4.5 3.5 - 5.1 mmol/L Final     Chloride   Date Value Ref Range Status   07/17/2022 101 95 - 110 mmol/L Final     CO2   Date Value Ref Range Status   07/17/2022 22 (L) 23 - 29 mmol/L Final     Glucose   Date Value Ref Range Status   07/17/2022 166 (H) 70 - 110 mg/dL Final     BUN   Date Value Ref Range Status   07/17/2022 39 (H) 6 - 20 mg/dL Final     Creatinine   Date Value Ref Range Status   07/17/2022 3.2 (H) 0.5 - 1.4 mg/dL Final     Calcium   Date Value Ref Range Status   07/17/2022 7.8 (L) 8.7 - 10.5 mg/dL Final     Total Protein   Date Value Ref Range Status   07/17/2022 4.9 (L) 6.0 - 8.4 g/dL Final     Albumin   Date Value Ref Range Status   07/17/2022 1.8 (L) 3.5 - 5.2 g/dL Final     Total Bilirubin   Date Value Ref  Range Status   07/17/2022 0.7 0.1 - 1.0 mg/dL Final     Comment:     For infants and newborns, interpretation of results should be based  on gestational age, weight and in agreement with clinical  observations.    Premature Infant recommended reference ranges:  Up to 24 hours.............<8.0 mg/dL  Up to 48 hours............<12.0 mg/dL  3-5 days..................<15.0 mg/dL  6-29 days.................<15.0 mg/dL       Alkaline Phosphatase   Date Value Ref Range Status   07/17/2022 82 55 - 135 U/L Final     AST   Date Value Ref Range Status   07/17/2022 14 10 - 40 U/L Final     ALT   Date Value Ref Range Status   07/17/2022 16 10 - 44 U/L Final     Anion Gap   Date Value Ref Range Status   07/17/2022 11 8 - 16 mmol/L Final     eGFR if    Date Value Ref Range Status   07/17/2022 21 (A) >60 mL/min/1.73 m^2 Final     eGFR if non    Date Value Ref Range Status   07/17/2022 18 (A) >60 mL/min/1.73 m^2 Final     Comment:     Calculation used to obtain the estimated glomerular filtration  rate (eGFR) is the CKD-EPI equation.        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    Jemal Study is negative.    Chronic kidney disease on hemodialysis currently.    Thrombocytopenia resolved    Hypertension urgency better controlled    Elevated glucose level secondary to medication    Improvement in total bilirubin.    > transfuse if hemoglobin less than 7 grams/deciliter.  > anemia appear to be stable and thrombocytopenia improved stable.  Prednisone 60 mg currently, reduce prednisone to 40 mg daily for 3 days then 20 mg daily for 3 days then 10 mg daily for 3 days then 5 mg daily for 3 days then stop.   > hematology will continue to follow while in patient.  Dr Khoobehi will follow   > continue CBC monitoring daily.  > we can give her IV iron Venofer 200 mg IV once daily x3 days.

## 2022-07-17 NOTE — PLAN OF CARE
Patient progressing towards discharge.    Patient had no acute events noted. Patient has slept since getting Dilaudid and Compazine at 2033. Patient was crying, screaming and vomiting prior to administration.  Right IJ temporary dialysis line removed overnight per order from Dr. Clark.  Pressure dressing remains in place with no drainage. Patient ambulatory to bathroom.  Discussed POC with patient and family with verbalization of understanding.    Will continue to monitor.

## 2022-07-17 NOTE — NURSING
Given atenlol as prescribed Pt fussed at nurse stating did not want to take med already took some meds. SN explained to patient this was in addition to am BP meds since BP was elevated. Pt reluctedly took pills.

## 2022-07-17 NOTE — NURSING
"Patient not wanting to take lactulose states   " I don't want it" Informed patient the KUB showed constipation needs assistance to move the  Stool.  "

## 2022-07-17 NOTE — CARE UPDATE
07/17/22 0717   PRE-TX-O2   O2 Device (Oxygen Therapy) room air   Oxygen Concentration (%) 21   SpO2 97 %   Pulse Oximetry Type Intermittent   $ Pulse Oximetry - Multiple Charge Pulse Oximetry - Multiple   Pulse 95   Resp 15

## 2022-07-17 NOTE — PLAN OF CARE
The pt is cleared for discharge home from case management after being seen by her provider.    07/17/22 0926   Final Note   Assessment Type Final Discharge Note   Anticipated Discharge Disposition Home

## 2022-07-17 NOTE — NURSING
Angelina Thornton FNP made aware of patient having persistent nausea with emesis despite zofran administration and complaints of neck, back, and abdominal pain of 10/10 despite hydrocodone administration.  Blood sugar also noted at 85 with uneaten jello and pudding at bedside.  Angelina Thornton FNP made aware of holding nighttime levemir dose.  Patient audibly moaning and rocking when entering room.  Given hydromorphone and compazine per order.  Patient resting comfortably and quietly post administration.  When patient awoken she immediately begins to moan again, but stops within a few minutes.

## 2022-07-17 NOTE — PROGRESS NOTES
Discharge instructions given to patient. Patient verbalized complete understanding.  IV discontinued with catheter intact, pressure applied to site and secured with tape. Patient tolerated well.

## 2022-07-18 ENCOUNTER — PATIENT OUTREACH (OUTPATIENT)
Dept: ADMINISTRATIVE | Facility: CLINIC | Age: 32
End: 2022-07-18
Payer: MEDICAID

## 2022-07-18 NOTE — PROGRESS NOTES
Second attempt  C3 nurse spoke with Tabby Howard for a TCC post hospital discharge follow up call. The patient reports does not have a scheduled HOSFU appointment. C3 nurse was unable to schedule HOSFU appointment for No PCP. Patient not eligible for NP - Living in [place].

## 2022-07-18 NOTE — PROGRESS NOTES
C3 nurse attempted to contact Tabby Howard for a TCC post hospital discharge follow up call. The patient is unable to conduct the call @ this time. C3 nurse determined that patient would need to get off the phone to get to her initial dialysis on 7/18/2022 @ 1115 to fill out paperwork - instructions were not found in AVS.    The patient does not have a scheduled HOSFU appointment within 7 days post hospital discharge date 7/17/2022. Patient does not have PCP on file.

## 2022-07-21 ENCOUNTER — OFFICE VISIT (OUTPATIENT)
Dept: SURGERY | Facility: CLINIC | Age: 32
End: 2022-07-21
Payer: MEDICAID

## 2022-07-21 ENCOUNTER — HOSPITAL ENCOUNTER (OUTPATIENT)
Dept: PREADMISSION TESTING | Facility: HOSPITAL | Age: 32
Discharge: HOME | End: 2022-07-21

## 2022-07-21 VITALS
WEIGHT: 167.75 LBS | SYSTOLIC BLOOD PRESSURE: 160 MMHG | RESPIRATION RATE: 17 BRPM | DIASTOLIC BLOOD PRESSURE: 99 MMHG | BODY MASS INDEX: 30.87 KG/M2 | HEART RATE: 96 BPM | HEIGHT: 62 IN

## 2022-07-21 DIAGNOSIS — N18.9 CHRONIC KIDNEY DISEASE, UNSPECIFIED CKD STAGE: Primary | ICD-10-CM

## 2022-07-21 DIAGNOSIS — N17.9 ACUTE RENAL FAILURE: ICD-10-CM

## 2022-07-21 DIAGNOSIS — N18.6 END STAGE RENAL DISEASE: ICD-10-CM

## 2022-07-21 PROCEDURE — 3077F PR MOST RECENT SYSTOLIC BLOOD PRESSURE >= 140 MM HG: ICD-10-PCS | Mod: CPTII,,, | Performed by: STUDENT IN AN ORGANIZED HEALTH CARE EDUCATION/TRAINING PROGRAM

## 2022-07-21 PROCEDURE — 3080F PR MOST RECENT DIASTOLIC BLOOD PRESSURE >= 90 MM HG: ICD-10-PCS | Mod: CPTII,,, | Performed by: STUDENT IN AN ORGANIZED HEALTH CARE EDUCATION/TRAINING PROGRAM

## 2022-07-21 PROCEDURE — 99215 OFFICE O/P EST HI 40 MIN: CPT | Mod: PBBFAC,PN | Performed by: STUDENT IN AN ORGANIZED HEALTH CARE EDUCATION/TRAINING PROGRAM

## 2022-07-21 PROCEDURE — 99999 PR PBB SHADOW E&M-EST. PATIENT-LVL V: CPT | Mod: PBBFAC,,, | Performed by: STUDENT IN AN ORGANIZED HEALTH CARE EDUCATION/TRAINING PROGRAM

## 2022-07-21 PROCEDURE — 4010F ACE/ARB THERAPY RXD/TAKEN: CPT | Mod: CPTII,,, | Performed by: STUDENT IN AN ORGANIZED HEALTH CARE EDUCATION/TRAINING PROGRAM

## 2022-07-21 PROCEDURE — 99024 PR POST-OP FOLLOW-UP VISIT: ICD-10-PCS | Mod: S$PBB,,, | Performed by: STUDENT IN AN ORGANIZED HEALTH CARE EDUCATION/TRAINING PROGRAM

## 2022-07-21 PROCEDURE — 1111F DSCHRG MED/CURRENT MED MERGE: CPT | Mod: CPTII,,, | Performed by: STUDENT IN AN ORGANIZED HEALTH CARE EDUCATION/TRAINING PROGRAM

## 2022-07-21 PROCEDURE — 3008F BODY MASS INDEX DOCD: CPT | Mod: CPTII,,, | Performed by: STUDENT IN AN ORGANIZED HEALTH CARE EDUCATION/TRAINING PROGRAM

## 2022-07-21 PROCEDURE — 4010F PR ACE/ARB THEARPY RXD/TAKEN: ICD-10-PCS | Mod: CPTII,,, | Performed by: STUDENT IN AN ORGANIZED HEALTH CARE EDUCATION/TRAINING PROGRAM

## 2022-07-21 PROCEDURE — 3008F PR BODY MASS INDEX (BMI) DOCUMENTED: ICD-10-PCS | Mod: CPTII,,, | Performed by: STUDENT IN AN ORGANIZED HEALTH CARE EDUCATION/TRAINING PROGRAM

## 2022-07-21 PROCEDURE — 99024 POSTOP FOLLOW-UP VISIT: CPT | Mod: S$PBB,,, | Performed by: STUDENT IN AN ORGANIZED HEALTH CARE EDUCATION/TRAINING PROGRAM

## 2022-07-21 PROCEDURE — 1159F MED LIST DOCD IN RCRD: CPT | Mod: CPTII,,, | Performed by: STUDENT IN AN ORGANIZED HEALTH CARE EDUCATION/TRAINING PROGRAM

## 2022-07-21 PROCEDURE — 1111F PR DISCHARGE MEDS RECONCILED W/ CURRENT OUTPATIENT MED LIST: ICD-10-PCS | Mod: CPTII,,, | Performed by: STUDENT IN AN ORGANIZED HEALTH CARE EDUCATION/TRAINING PROGRAM

## 2022-07-21 PROCEDURE — 99999 PR PBB SHADOW E&M-EST. PATIENT-LVL V: ICD-10-PCS | Mod: PBBFAC,,, | Performed by: STUDENT IN AN ORGANIZED HEALTH CARE EDUCATION/TRAINING PROGRAM

## 2022-07-21 PROCEDURE — 1159F PR MEDICATION LIST DOCUMENTED IN MEDICAL RECORD: ICD-10-PCS | Mod: CPTII,,, | Performed by: STUDENT IN AN ORGANIZED HEALTH CARE EDUCATION/TRAINING PROGRAM

## 2022-07-21 PROCEDURE — 3077F SYST BP >= 140 MM HG: CPT | Mod: CPTII,,, | Performed by: STUDENT IN AN ORGANIZED HEALTH CARE EDUCATION/TRAINING PROGRAM

## 2022-07-21 PROCEDURE — 3080F DIAST BP >= 90 MM HG: CPT | Mod: CPTII,,, | Performed by: STUDENT IN AN ORGANIZED HEALTH CARE EDUCATION/TRAINING PROGRAM

## 2022-07-21 RX ORDER — SODIUM CHLORIDE 9 MG/ML
INJECTION, SOLUTION INTRAVENOUS CONTINUOUS
Status: CANCELLED | OUTPATIENT
Start: 2022-07-22

## 2022-07-21 NOTE — PRE-PROCEDURE INSTRUCTIONS
Called patient to go over pre-op instructions.  No answer, left instructions on voicemail and will also send through my [facility].

## 2022-07-21 NOTE — PATIENT INSTRUCTIONS
Your procedure has been scheduled at:    [facility]pre-admit nurse  [phone]  [facility].........................................................pre_admit  [phone]  [place]  [facility]  [address] [facility] [address]    If you have had heart surgery, atrial fibrillation and are on blood thinners from your cardiologist you will need cardiac clearance prior to surgery!!!      DAY   Friday    DATE   July 22, 2022         Someone from the hospital will call you the evening before surgery to let you know what time you need to be at the hospital for surgery.                                               1:  DO NOT EAT OR DRINK ANYTHING AFTER MIDNIGHT THE NIGHT BEFORE SURGERY.     2:  You will need to stop any blood thinners 1 week prior to surgery.  This includes Aspirin, fish oil, Pradaxa, Coumadin, Plavix, Pletal.  Please contact the prescribing doctor to be sure it is ok to stop these medicines.    3:  Pre-admit nurse will go over your medicines and let you know which ones not to take the morning of surgery    4:  Plan to have someone drive you home after you are released from the hospital.  You WILL NOT be able to drive yourself.    5:  If you have any questions or need to change your surgery date, please call Ramin Melara or Jennifer at [phone] or [phone]    AFTER SURGERY:    You can shower 48 hours after surgery, incision can get wet but do not soak. REMOVE WET BANDAGES AND BANDAIDS, pat dry, leave the steri- strips on until they start to curl on the edges.   If you have not had a bowel movement within 3 days after surgery you may take a laxative of your choice.  Do not lift anything over 5-10 pounds.    You need to have a follow up appointment 7-14 days after surgery, call the office to schedule or if you have questions or concerns.

## 2022-07-21 NOTE — PROGRESS NOTES
History & Physical    SUBJECTIVE:     History of Present Illness:  Patient is a 32 y.o. female presents   After she had a tunneled hemodialysis placed in the left neck and chest a few weeks ago.  She was at dialysis today and noted profuse bleeding from the skin site of the catheter.  She was referred to me for evaluation.    No chief complaint on file.      Review of patient's allergies indicates:   Allergen Reactions    Penicillins Hives       Current Outpatient Medications   Medication Sig Dispense Refill    amLODIPine (NORVASC) 10 MG tablet Take 1 tablet (10 mg total) by mouth once daily. 30 tablet 11    atenoloL (TENORMIN) 50 MG tablet Take 1 tablet (50 mg total) by mouth every other day. 45 tablet 3    famotidine (PEPCID) 20 MG tablet Take 1 tablet (20 mg total) by mouth once daily. 30 tablet 0    FLUoxetine 20 MG capsule Take 1 capsule (20 mg total) by mouth once daily. 30 capsule 1    HYDROcodone-acetaminophen (NORCO) 5-325 mg per tablet Take 1 tablet by mouth every 6 (six) hours as needed for Pain. 20 tablet 0    insulin aspart U-100 (NOVOLOG) 100 unit/mL (3 mL) InPn pen Inject 1-10 Units into the skin before meals and at bedtime as needed (Hyperglycemia). 3 mL 3    isosorbide mononitrate (IMDUR) 60 MG 24 hr tablet Take 2 tablets (120 mg total) by mouth once daily. 30 tablet 1    LANTUS U-100 INSULIN 100 unit/mL injection SMARTSI Unit(s) SUB-Q Every Morning      levETIRAcetam (KEPPRA) 500 MG Tab Take 1 tablet (500 mg total) by mouth 2 (two) times daily. 60 tablet 1    polyethylene glycol (GLYCOLAX) 17 gram/dose powder Take 17 g by mouth 2 (two) times daily. 510 each 0    predniSONE (DELTASONE) 20 MG tablet Take 3 tablets (60 mg total) by mouth once daily for 4 days, THEN 2 tablets (40 mg total) once daily for 4 days, THEN 1 tablet (20 mg total) once daily for 4 days, THEN 0.5 tablets (10 mg total) once daily for 4 days. 26 tablet 0     No current facility-administered medications for this  "visit.       Past Medical History:   Diagnosis Date    CKD (chronic kidney disease), stage IV 2022    Diabetes mellitus due to underlying condition with unspecified complications 2022    Gastroparesis 2022    Heart failure with preserved ejection fraction 2022    EF 55% on 3/22    History of gastroesophageal reflux (GERD)     History of supraventricular tachycardia     Hyperkalemia 2022    Hypertensive emergency 2022    Sickle cell trait 2022    Type 2 diabetes mellitus      Past Surgical History:   Procedure Laterality Date     SECTION      x 3    ESOPHAGOGASTRODUODENOSCOPY N/A 10/18/2019    Procedure: ESOPHAGOGASTRODUODENOSCOPY (EGD);  Surgeon: Gianluca Mendez MD;  Location: [facility];  Service: Endoscopy;  Laterality: N/A;    PLACEMENT OF DUAL-LUMEN VASCULAR CATHETER Left 2022    Procedure: INSERTION-CATHETER-JOSEPH;  Surgeon: Dionte Gan MD;  Location: [facility] OR;  Service: General;  Laterality: Left;     Family History   Problem Relation Age of Onset    Diabetes Mother     Diabetes Father      Social History     Tobacco Use    Smoking status: Never Smoker    Smokeless tobacco: Never Used   Substance Use Topics    Alcohol use: No    Drug use: No        Review of Systems:  Review of Systems   Constitutional: Negative for fever.   HENT: Negative.    Eyes: Negative.    Respiratory: Negative.    Cardiovascular: Negative.    Endocrine: Negative.    Genitourinary: Negative.    Musculoskeletal: Negative.    Skin: Negative.    Allergic/Immunologic: Negative.    Neurological: Negative.    Hematological: Negative.    Psychiatric/Behavioral: Negative.        OBJECTIVE:     Vital Signs (Most Recent)  Pulse: 96 (22 1334)  Resp: 17 (22 1334)  BP: (!) 160/99 (22 1334)  5' 2" (1.575 m)  76.1 kg (167 lb 12.3 oz)     Physical Exam:  Physical Exam  Constitutional:       General: She is not in acute distress.     Appearance: Normal appearance. She " is not ill-appearing, toxic-appearing or diaphoretic.   HENT:      Head: Normocephalic.      Nose: Nose normal.   Eyes:      Conjunctiva/sclera: Conjunctivae normal.   Cardiovascular:      Rate and Rhythm: Normal rate and regular rhythm.   Pulmonary:      Effort: Pulmonary effort is normal.   Abdominal:      Palpations: Abdomen is soft.   Musculoskeletal:         General: Normal range of motion.      Cervical back: Normal range of motion.   Skin:     General: Skin is warm.   Neurological:      General: No focal deficit present.      Mental Status: She is alert.   Psychiatric:         Mood and Affect: Mood normal.         ASSESSMENT/PLAN:       32-year-old female with end-stage renal disease.  She was at dialysis today and had profuse bleeding from the catheter exit site in the skin.  The catheter appears intact.  There was no way to currently access or perform a tube check in the clinic.  I recommended proceeding with line exchange.  There is a possibility that the line is cracked internally.    PLAN:    Risks benefits of operative intervention were discussed.  Patient did consent to removal of her current HemoSplit line and placement of a new 1.

## 2022-07-22 ENCOUNTER — ANESTHESIA (OUTPATIENT)
Dept: INTENSIVE CARE | Facility: HOSPITAL | Age: 32
DRG: 208 | End: 2022-07-22
Payer: MEDICAID

## 2022-07-22 ENCOUNTER — ANESTHESIA EVENT (OUTPATIENT)
Dept: INTENSIVE CARE | Facility: HOSPITAL | Age: 32
DRG: 208 | End: 2022-07-22
Payer: MEDICAID

## 2022-07-22 ENCOUNTER — HOSPITAL ENCOUNTER (INPATIENT)
Facility: HOSPITAL | Age: 32
LOS: 5 days | Discharge: HOME | DRG: 208 | End: 2022-07-27
Attending: EMERGENCY MEDICINE | Admitting: INTERNAL MEDICINE
Payer: MEDICAID

## 2022-07-22 DIAGNOSIS — J96.01 ACUTE HYPOXEMIC RESPIRATORY FAILURE: ICD-10-CM

## 2022-07-22 DIAGNOSIS — R10.9 ABDOMINAL PAIN: ICD-10-CM

## 2022-07-22 DIAGNOSIS — J96.00 RESPIRATORY FAILURE, ACUTE: ICD-10-CM

## 2022-07-22 DIAGNOSIS — N18.6 ESRD (END STAGE RENAL DISEASE): Primary | ICD-10-CM

## 2022-07-22 DIAGNOSIS — I10 PRIMARY HYPERTENSION: ICD-10-CM

## 2022-07-22 DIAGNOSIS — R07.9 CHEST PAIN: ICD-10-CM

## 2022-07-22 PROBLEM — J18.9 HCAP (HEALTHCARE-ASSOCIATED PNEUMONIA): Status: ACTIVE | Noted: 2022-07-22

## 2022-07-22 PROBLEM — J80 ARDS (ADULT RESPIRATORY DISTRESS SYNDROME): Status: ACTIVE | Noted: 2022-07-22

## 2022-07-22 PROBLEM — R73.9 HYPERGLYCEMIA WITHOUT KETOSIS: Status: ACTIVE | Noted: 2022-07-22

## 2022-07-22 LAB
ALLENS TEST: ABNORMAL
ALLENS TEST: ABNORMAL
ANION GAP SERPL CALC-SCNC: 14 MMOL/L (ref 8–16)
B-OH-BUTYR BLD STRIP-SCNC: 0 MMOL/L (ref 0–0.5)
BASOPHILS # BLD AUTO: 0.02 K/UL (ref 0–0.2)
BASOPHILS NFR BLD: 0.1 % (ref 0–1.9)
BUN SERPL-MCNC: 50 MG/DL (ref 6–20)
CALCIUM SERPL-MCNC: 7.8 MG/DL (ref 8.7–10.5)
CHLORIDE SERPL-SCNC: 102 MMOL/L (ref 95–110)
CO2 SERPL-SCNC: 18 MMOL/L (ref 23–29)
CREAT SERPL-MCNC: 4.4 MG/DL (ref 0.5–1.4)
DELSYS: ABNORMAL
DELSYS: ABNORMAL
DIFFERENTIAL METHOD: ABNORMAL
EOSINOPHIL # BLD AUTO: 0 K/UL (ref 0–0.5)
EOSINOPHIL NFR BLD: 0.1 % (ref 0–8)
ERYTHROCYTE [DISTWIDTH] IN BLOOD BY AUTOMATED COUNT: 15.3 % (ref 11.5–14.5)
ERYTHROCYTE [SEDIMENTATION RATE] IN BLOOD BY WESTERGREN METHOD: 30 MM/H
ERYTHROCYTE [SEDIMENTATION RATE] IN BLOOD BY WESTERGREN METHOD: 30 MM/H
EST. GFR  (AFRICAN AMERICAN): 14 ML/MIN/1.73 M^2
EST. GFR  (NON AFRICAN AMERICAN): 12 ML/MIN/1.73 M^2
ESTIMATED AVG GLUCOSE: 126 MG/DL (ref 68–131)
FIO2: 100
FIO2: 100
GLUCOSE SERPL-MCNC: 237 MG/DL (ref 70–110)
GLUCOSE SERPL-MCNC: 451 MG/DL (ref 70–110)
GLUCOSE SERPL-MCNC: 601 MG/DL (ref 70–110)
GLUCOSE SERPL-MCNC: 615 MG/DL (ref 70–110)
GLUCOSE SERPL-MCNC: 617 MG/DL (ref 70–110)
HBA1C MFR BLD: 6 % (ref 4–5.6)
HCO3 UR-SCNC: 20.4 MMOL/L (ref 24–28)
HCO3 UR-SCNC: 22.8 MMOL/L (ref 24–28)
HCT VFR BLD AUTO: 22.8 % (ref 37–48.5)
HGB BLD-MCNC: 8.2 G/DL (ref 12–16)
IMM GRANULOCYTES # BLD AUTO: 0.3 K/UL (ref 0–0.04)
IMM GRANULOCYTES NFR BLD AUTO: 1.2 % (ref 0–0.5)
IRON SERPL-MCNC: 87 UG/DL (ref 30–160)
LACTATE SERPL-SCNC: 0.8 MMOL/L (ref 0.5–2.2)
LACTATE SERPL-SCNC: 1.1 MMOL/L (ref 0.5–2.2)
LACTATE SERPL-SCNC: 2.3 MMOL/L (ref 0.5–2.2)
LYMPHOCYTES # BLD AUTO: 0.6 K/UL (ref 1–4.8)
LYMPHOCYTES NFR BLD: 2.6 % (ref 18–48)
MCH RBC QN AUTO: 29.6 PG (ref 27–31)
MCHC RBC AUTO-ENTMCNC: 36 G/DL (ref 32–36)
MCV RBC AUTO: 82 FL (ref 82–98)
MIN VOL: 16.1
MODE: ABNORMAL
MODE: ABNORMAL
MONOCYTES # BLD AUTO: 0.9 K/UL (ref 0.3–1)
MONOCYTES NFR BLD: 3.8 % (ref 4–15)
NEUTROPHILS # BLD AUTO: 22.6 K/UL (ref 1.8–7.7)
NEUTROPHILS NFR BLD: 92.2 % (ref 38–73)
NRBC BLD-RTO: 0 /100 WBC
PCO2 BLDA: 34.5 MMHG (ref 35–45)
PCO2 BLDA: 41.1 MMHG (ref 35–45)
PEEP: 18
PEEP: 18
PH SMN: 7.35 [PH] (ref 7.35–7.45)
PH SMN: 7.38 [PH] (ref 7.35–7.45)
PIP: 38
PLATELET # BLD AUTO: 187 K/UL (ref 150–450)
PMV BLD AUTO: 9.9 FL (ref 9.2–12.9)
PO2 BLDA: 227 MMHG (ref 80–100)
PO2 BLDA: 64 MMHG (ref 80–100)
POC BE: -3 MMOL/L
POC BE: -5 MMOL/L
POC SATURATED O2: 100 % (ref 95–100)
POC SATURATED O2: 91 % (ref 95–100)
POC TCO2: 21 MMOL/L (ref 23–27)
POC TCO2: 24 MMOL/L (ref 23–27)
POCT GLUCOSE: 244 MG/DL (ref 70–110)
POCT GLUCOSE: 257 MG/DL (ref 70–110)
POCT GLUCOSE: 260 MG/DL (ref 70–110)
POCT GLUCOSE: 263 MG/DL (ref 70–110)
POCT GLUCOSE: 302 MG/DL (ref 70–110)
POCT GLUCOSE: 447 MG/DL (ref 70–110)
POCT GLUCOSE: 449 MG/DL (ref 70–110)
POCT GLUCOSE: >500 MG/DL (ref 70–110)
POTASSIUM SERPL-SCNC: 4.1 MMOL/L (ref 3.5–5.1)
RBC # BLD AUTO: 2.77 M/UL (ref 4–5.4)
SAMPLE: ABNORMAL
SAMPLE: ABNORMAL
SATURATED IRON: 49 % (ref 20–50)
SITE: ABNORMAL
SITE: ABNORMAL
SODIUM SERPL-SCNC: 134 MMOL/L (ref 136–145)
SP02: 100
SP02: 96
TOTAL IRON BINDING CAPACITY: 178 UG/DL (ref 250–450)
TRANSFERRIN SERPL-MCNC: 120 MG/DL (ref 200–375)
VIT B12 SERPL-MCNC: 774 PG/ML (ref 210–950)
VT: 450
VT: 500
WBC # BLD AUTO: 24.5 K/UL (ref 3.9–12.7)

## 2022-07-22 PROCEDURE — 82010 KETONE BODYS QUAN: CPT | Performed by: INTERNAL MEDICINE

## 2022-07-22 PROCEDURE — 85025 COMPLETE CBC W/AUTO DIFF WBC: CPT | Performed by: EMERGENCY MEDICINE

## 2022-07-22 PROCEDURE — 94002 VENT MGMT INPAT INIT DAY: CPT

## 2022-07-22 PROCEDURE — 36600 WITHDRAWAL OF ARTERIAL BLOOD: CPT

## 2022-07-22 PROCEDURE — 63600175 PHARM REV CODE 636 W HCPCS: Performed by: INTERNAL MEDICINE

## 2022-07-22 PROCEDURE — 94660 CPAP INITIATION&MGMT: CPT

## 2022-07-22 PROCEDURE — 87040 BLOOD CULTURE FOR BACTERIA: CPT | Mod: 59 | Performed by: EMERGENCY MEDICINE

## 2022-07-22 PROCEDURE — 25000003 PHARM REV CODE 250: Performed by: EMERGENCY MEDICINE

## 2022-07-22 PROCEDURE — 63600175 PHARM REV CODE 636 W HCPCS: Performed by: EMERGENCY MEDICINE

## 2022-07-22 PROCEDURE — 99292 CRITICAL CARE ADDL 30 MIN: CPT

## 2022-07-22 PROCEDURE — 83605 ASSAY OF LACTIC ACID: CPT | Mod: 91 | Performed by: INTERNAL MEDICINE

## 2022-07-22 PROCEDURE — C1751 CATH, INF, PER/CENT/MIDLINE: HCPCS

## 2022-07-22 PROCEDURE — 27000190 HC CPAP FULL FACE MASK W/VALVE

## 2022-07-22 PROCEDURE — 25000003 PHARM REV CODE 250: Performed by: INTERNAL MEDICINE

## 2022-07-22 PROCEDURE — 36410 VNPNXR 3YR/> PHY/QHP DX/THER: CPT

## 2022-07-22 PROCEDURE — 82947 ASSAY GLUCOSE BLOOD QUANT: CPT | Performed by: ANESTHESIOLOGY

## 2022-07-22 PROCEDURE — 27000221 HC OXYGEN, UP TO 24 HOURS

## 2022-07-22 PROCEDURE — 93010 ELECTROCARDIOGRAM REPORT: CPT | Mod: ,,, | Performed by: SPECIALIST

## 2022-07-22 PROCEDURE — 83036 HEMOGLOBIN GLYCOSYLATED A1C: CPT | Performed by: INTERNAL MEDICINE

## 2022-07-22 PROCEDURE — 84466 ASSAY OF TRANSFERRIN: CPT | Performed by: INTERNAL MEDICINE

## 2022-07-22 PROCEDURE — 99291 CRITICAL CARE FIRST HOUR: CPT | Mod: ,,, | Performed by: INTERNAL MEDICINE

## 2022-07-22 PROCEDURE — 37799 UNLISTED PX VASCULAR SURGERY: CPT

## 2022-07-22 PROCEDURE — 99291 CRITICAL CARE FIRST HOUR: CPT | Mod: 25

## 2022-07-22 PROCEDURE — 36556 INSERT NON-TUNNEL CV CATH: CPT | Mod: 51,,, | Performed by: STUDENT IN AN ORGANIZED HEALTH CARE EDUCATION/TRAINING PROGRAM

## 2022-07-22 PROCEDURE — 93010 EKG 12-LEAD: ICD-10-PCS | Mod: ,,, | Performed by: SPECIALIST

## 2022-07-22 PROCEDURE — 31500 INSERT EMERGENCY AIRWAY: CPT | Mod: ,,, | Performed by: ANESTHESIOLOGY

## 2022-07-22 PROCEDURE — 83605 ASSAY OF LACTIC ACID: CPT | Performed by: EMERGENCY MEDICINE

## 2022-07-22 PROCEDURE — 76937 US GUIDE VASCULAR ACCESS: CPT

## 2022-07-22 PROCEDURE — 80048 BASIC METABOLIC PNL TOTAL CA: CPT | Performed by: EMERGENCY MEDICINE

## 2022-07-22 PROCEDURE — 63600175 PHARM REV CODE 636 W HCPCS: Performed by: NURSE PRACTITIONER

## 2022-07-22 PROCEDURE — 82607 VITAMIN B-12: CPT | Performed by: INTERNAL MEDICINE

## 2022-07-22 PROCEDURE — 87070 CULTURE OTHR SPECIMN AEROBIC: CPT | Performed by: INTERNAL MEDICINE

## 2022-07-22 PROCEDURE — 99900035 HC TECH TIME PER 15 MIN (STAT)

## 2022-07-22 PROCEDURE — 25000003 PHARM REV CODE 250

## 2022-07-22 PROCEDURE — 36589 REMOVAL TUNNELED CV CATH: CPT | Mod: ,,, | Performed by: STUDENT IN AN ORGANIZED HEALTH CARE EDUCATION/TRAINING PROGRAM

## 2022-07-22 PROCEDURE — 63600175 PHARM REV CODE 636 W HCPCS: Performed by: NURSE ANESTHETIST, CERTIFIED REGISTERED

## 2022-07-22 PROCEDURE — 82803 BLOOD GASES ANY COMBINATION: CPT

## 2022-07-22 PROCEDURE — 20000000 HC ICU ROOM

## 2022-07-22 PROCEDURE — 96365 THER/PROPH/DIAG IV INF INIT: CPT

## 2022-07-22 PROCEDURE — 36589 PR REMOVAL TUNNELED CV CATH W/O SUBQ PORT OR PUMP: ICD-10-PCS | Mod: ,,, | Performed by: STUDENT IN AN ORGANIZED HEALTH CARE EDUCATION/TRAINING PROGRAM

## 2022-07-22 PROCEDURE — 25000003 PHARM REV CODE 250: Performed by: NURSE ANESTHETIST, CERTIFIED REGISTERED

## 2022-07-22 PROCEDURE — 82947 ASSAY GLUCOSE BLOOD QUANT: CPT | Mod: 91 | Performed by: INTERNAL MEDICINE

## 2022-07-22 PROCEDURE — 31500 AD HOC INTUBATION: ICD-10-PCS | Mod: ,,, | Performed by: ANESTHESIOLOGY

## 2022-07-22 PROCEDURE — 36415 COLL VENOUS BLD VENIPUNCTURE: CPT | Performed by: EMERGENCY MEDICINE

## 2022-07-22 PROCEDURE — 63600175 PHARM REV CODE 636 W HCPCS

## 2022-07-22 PROCEDURE — 87205 SMEAR GRAM STAIN: CPT | Performed by: INTERNAL MEDICINE

## 2022-07-22 PROCEDURE — A4216 STERILE WATER/SALINE, 10 ML: HCPCS | Performed by: INTERNAL MEDICINE

## 2022-07-22 PROCEDURE — 80100014 HC HEMODIALYSIS 1:1

## 2022-07-22 PROCEDURE — 96375 TX/PRO/DX INJ NEW DRUG ADDON: CPT

## 2022-07-22 PROCEDURE — 99291 PR CRITICAL CARE, E/M 30-74 MINUTES: ICD-10-PCS | Mod: ,,, | Performed by: INTERNAL MEDICINE

## 2022-07-22 PROCEDURE — 36415 COLL VENOUS BLD VENIPUNCTURE: CPT | Performed by: INTERNAL MEDICINE

## 2022-07-22 PROCEDURE — 36556 PR INSERT NON-TUNNEL CV CATH 5+ YRS OLD: ICD-10-PCS | Mod: 51,,, | Performed by: STUDENT IN AN ORGANIZED HEALTH CARE EDUCATION/TRAINING PROGRAM

## 2022-07-22 PROCEDURE — 93005 ELECTROCARDIOGRAM TRACING: CPT

## 2022-07-22 RX ORDER — IBUPROFEN 200 MG
16 TABLET ORAL
Status: DISCONTINUED | OUTPATIENT
Start: 2022-07-22 | End: 2022-07-27 | Stop reason: HOSPADM

## 2022-07-22 RX ORDER — SODIUM,POTASSIUM PHOSPHATES 280-250MG
2 POWDER IN PACKET (EA) ORAL
Status: DISCONTINUED | OUTPATIENT
Start: 2022-07-22 | End: 2022-07-27 | Stop reason: HOSPADM

## 2022-07-22 RX ORDER — NOREPINEPHRINE BITARTRATE/D5W 4MG/250ML
PLASTIC BAG, INJECTION (ML) INTRAVENOUS
Status: COMPLETED
Start: 2022-07-22 | End: 2022-07-22

## 2022-07-22 RX ORDER — NALOXONE HCL 0.4 MG/ML
0.02 VIAL (ML) INJECTION
Status: DISCONTINUED | OUTPATIENT
Start: 2022-07-22 | End: 2022-07-27 | Stop reason: HOSPADM

## 2022-07-22 RX ORDER — EPINEPHRINE 0.1 MG/ML
INJECTION INTRAVENOUS
Status: COMPLETED
Start: 2022-07-22 | End: 2022-07-22

## 2022-07-22 RX ORDER — ATENOLOL 50 MG/1
50 TABLET ORAL EVERY OTHER DAY
Status: DISCONTINUED | OUTPATIENT
Start: 2022-07-23 | End: 2022-07-25

## 2022-07-22 RX ORDER — PROPOFOL 10 MG/ML
0-50 INJECTION, EMULSION INTRAVENOUS CONTINUOUS
Status: DISCONTINUED | OUTPATIENT
Start: 2022-07-22 | End: 2022-07-24

## 2022-07-22 RX ORDER — PROPOFOL 10 MG/ML
INJECTION, EMULSION INTRAVENOUS
Status: COMPLETED
Start: 2022-07-22 | End: 2022-07-22

## 2022-07-22 RX ORDER — ISOSORBIDE MONONITRATE 60 MG/1
120 TABLET, EXTENDED RELEASE ORAL DAILY
Status: DISCONTINUED | OUTPATIENT
Start: 2022-07-22 | End: 2022-07-27 | Stop reason: HOSPADM

## 2022-07-22 RX ORDER — AMLODIPINE BESYLATE 5 MG/1
10 TABLET ORAL DAILY
Status: DISCONTINUED | OUTPATIENT
Start: 2022-07-22 | End: 2022-07-27 | Stop reason: HOSPADM

## 2022-07-22 RX ORDER — HYDROMORPHONE HYDROCHLORIDE 2 MG/ML
1 INJECTION, SOLUTION INTRAMUSCULAR; INTRAVENOUS; SUBCUTANEOUS
Status: COMPLETED | OUTPATIENT
Start: 2022-07-22 | End: 2022-07-22

## 2022-07-22 RX ORDER — HYDROCODONE BITARTRATE AND ACETAMINOPHEN 5; 325 MG/1; MG/1
1 TABLET ORAL EVERY 6 HOURS PRN
Status: DISCONTINUED | OUTPATIENT
Start: 2022-07-22 | End: 2022-07-27 | Stop reason: HOSPADM

## 2022-07-22 RX ORDER — FUROSEMIDE 10 MG/ML
40 INJECTION INTRAMUSCULAR; INTRAVENOUS ONCE
Status: COMPLETED | OUTPATIENT
Start: 2022-07-22 | End: 2022-07-22

## 2022-07-22 RX ORDER — LEVOFLOXACIN 5 MG/ML
500 INJECTION, SOLUTION INTRAVENOUS
Status: DISCONTINUED | OUTPATIENT
Start: 2022-07-22 | End: 2022-07-24

## 2022-07-22 RX ORDER — ROCURONIUM BROMIDE 10 MG/ML
INJECTION, SOLUTION INTRAVENOUS
Status: DISCONTINUED
Start: 2022-07-22 | End: 2022-07-22 | Stop reason: WASHOUT

## 2022-07-22 RX ORDER — LANOLIN ALCOHOL/MO/W.PET/CERES
800 CREAM (GRAM) TOPICAL
Status: DISCONTINUED | OUTPATIENT
Start: 2022-07-22 | End: 2022-07-27 | Stop reason: HOSPADM

## 2022-07-22 RX ORDER — HEPARIN SODIUM 5000 [USP'U]/ML
5000 INJECTION, SOLUTION INTRAVENOUS; SUBCUTANEOUS EVERY 8 HOURS
Status: DISCONTINUED | OUTPATIENT
Start: 2022-07-22 | End: 2022-07-24

## 2022-07-22 RX ORDER — FENTANYL CITRATE 50 UG/ML
INJECTION, SOLUTION INTRAMUSCULAR; INTRAVENOUS
Status: DISCONTINUED | OUTPATIENT
Start: 2022-07-22 | End: 2022-08-01 | Stop reason: HOSPADM

## 2022-07-22 RX ORDER — SODIUM CHLORIDE 9 MG/ML
INJECTION, SOLUTION INTRAVENOUS
Status: DISCONTINUED | OUTPATIENT
Start: 2022-07-22 | End: 2022-07-23

## 2022-07-22 RX ORDER — INSULIN ASPART 100 [IU]/ML
0-5 INJECTION, SOLUTION INTRAVENOUS; SUBCUTANEOUS
Status: DISCONTINUED | OUTPATIENT
Start: 2022-07-22 | End: 2022-07-23

## 2022-07-22 RX ORDER — POLYETHYLENE GLYCOL 3350 17 G/17G
17 POWDER, FOR SOLUTION ORAL DAILY PRN
Status: DISCONTINUED | OUTPATIENT
Start: 2022-07-22 | End: 2022-07-27 | Stop reason: HOSPADM

## 2022-07-22 RX ORDER — CEFEPIME HYDROCHLORIDE 1 G/50ML
1 INJECTION, SOLUTION INTRAVENOUS
Status: COMPLETED | OUTPATIENT
Start: 2022-07-22 | End: 2022-07-22

## 2022-07-22 RX ORDER — MORPHINE SULFATE 4 MG/ML
INJECTION, SOLUTION INTRAMUSCULAR; INTRAVENOUS
Status: COMPLETED
Start: 2022-07-22 | End: 2022-07-22

## 2022-07-22 RX ORDER — SUCCINYLCHOLINE CHLORIDE 20 MG/ML
INJECTION INTRAMUSCULAR; INTRAVENOUS
Status: DISCONTINUED | OUTPATIENT
Start: 2022-07-22 | End: 2022-08-01 | Stop reason: HOSPADM

## 2022-07-22 RX ORDER — HYDROCODONE BITARTRATE AND ACETAMINOPHEN 5; 325 MG/1; MG/1
1 TABLET ORAL EVERY 6 HOURS PRN
Status: DISCONTINUED | OUTPATIENT
Start: 2022-07-22 | End: 2022-07-22 | Stop reason: SDUPTHER

## 2022-07-22 RX ORDER — ROCURONIUM BROMIDE 10 MG/ML
INJECTION, SOLUTION INTRAVENOUS
Status: DISCONTINUED | OUTPATIENT
Start: 2022-07-22 | End: 2022-08-01 | Stop reason: HOSPADM

## 2022-07-22 RX ORDER — PROPOFOL 10 MG/ML
VIAL (ML) INTRAVENOUS
Status: DISCONTINUED | OUTPATIENT
Start: 2022-07-22 | End: 2022-08-01 | Stop reason: HOSPADM

## 2022-07-22 RX ORDER — SUCCINYLCHOLINE CHLORIDE 20 MG/ML
INJECTION INTRAMUSCULAR; INTRAVENOUS
Status: DISCONTINUED
Start: 2022-07-22 | End: 2022-07-22 | Stop reason: WASHOUT

## 2022-07-22 RX ORDER — HEPARIN SODIUM 1000 [USP'U]/ML
3000 INJECTION, SOLUTION INTRAVENOUS; SUBCUTANEOUS ONCE
Status: COMPLETED | OUTPATIENT
Start: 2022-07-22 | End: 2022-07-22

## 2022-07-22 RX ORDER — SODIUM CHLORIDE 0.9 % (FLUSH) 0.9 %
10 SYRINGE (ML) INJECTION EVERY 8 HOURS
Status: DISCONTINUED | OUTPATIENT
Start: 2022-07-22 | End: 2022-07-27 | Stop reason: HOSPADM

## 2022-07-22 RX ORDER — FAMOTIDINE 20 MG/1
20 TABLET, FILM COATED ORAL DAILY
Status: DISCONTINUED | OUTPATIENT
Start: 2022-07-22 | End: 2022-07-27 | Stop reason: HOSPADM

## 2022-07-22 RX ORDER — MUPIROCIN 20 MG/G
OINTMENT TOPICAL 2 TIMES DAILY
Status: COMPLETED | OUTPATIENT
Start: 2022-07-22 | End: 2022-07-27

## 2022-07-22 RX ORDER — FUROSEMIDE 40 MG/1
40 TABLET ORAL 2 TIMES DAILY
Status: DISCONTINUED | OUTPATIENT
Start: 2022-07-22 | End: 2022-07-23

## 2022-07-22 RX ORDER — MORPHINE SULFATE 4 MG/ML
4 INJECTION, SOLUTION INTRAMUSCULAR; INTRAVENOUS ONCE
Status: COMPLETED | OUTPATIENT
Start: 2022-07-22 | End: 2022-07-22

## 2022-07-22 RX ORDER — SODIUM CHLORIDE 0.9 % (FLUSH) 0.9 %
10 SYRINGE (ML) INJECTION
Status: DISCONTINUED | OUTPATIENT
Start: 2022-07-22 | End: 2022-07-27 | Stop reason: HOSPADM

## 2022-07-22 RX ORDER — ETOMIDATE 2 MG/ML
INJECTION INTRAVENOUS
Status: DISCONTINUED
Start: 2022-07-22 | End: 2022-07-22 | Stop reason: WASHOUT

## 2022-07-22 RX ORDER — HYDROMORPHONE HYDROCHLORIDE 2 MG/ML
2 INJECTION, SOLUTION INTRAMUSCULAR; INTRAVENOUS; SUBCUTANEOUS
Status: DISCONTINUED | OUTPATIENT
Start: 2022-07-22 | End: 2022-07-27 | Stop reason: HOSPADM

## 2022-07-22 RX ORDER — FLUOXETINE HYDROCHLORIDE 20 MG/1
20 CAPSULE ORAL DAILY
Status: DISCONTINUED | OUTPATIENT
Start: 2022-07-22 | End: 2022-07-27 | Stop reason: HOSPADM

## 2022-07-22 RX ORDER — POLYETHYLENE GLYCOL 3350 17 G/17G
17 POWDER, FOR SOLUTION ORAL 2 TIMES DAILY
Status: DISCONTINUED | OUTPATIENT
Start: 2022-07-22 | End: 2022-07-27 | Stop reason: HOSPADM

## 2022-07-22 RX ORDER — SODIUM CHLORIDE 9 MG/ML
INJECTION, SOLUTION INTRAVENOUS ONCE
Status: DISCONTINUED | OUTPATIENT
Start: 2022-07-22 | End: 2022-07-23

## 2022-07-22 RX ORDER — IBUPROFEN 200 MG
24 TABLET ORAL
Status: DISCONTINUED | OUTPATIENT
Start: 2022-07-22 | End: 2022-07-27 | Stop reason: HOSPADM

## 2022-07-22 RX ORDER — ONDANSETRON 2 MG/ML
4 INJECTION INTRAMUSCULAR; INTRAVENOUS EVERY 8 HOURS PRN
Status: DISCONTINUED | OUTPATIENT
Start: 2022-07-22 | End: 2022-07-27 | Stop reason: HOSPADM

## 2022-07-22 RX ORDER — MIDAZOLAM HYDROCHLORIDE 1 MG/ML
INJECTION, SOLUTION INTRAMUSCULAR; INTRAVENOUS
Status: DISCONTINUED | OUTPATIENT
Start: 2022-07-22 | End: 2022-08-01 | Stop reason: HOSPADM

## 2022-07-22 RX ORDER — FOLIC ACID 1 MG/1
1 TABLET ORAL DAILY
Status: DISCONTINUED | OUTPATIENT
Start: 2022-07-22 | End: 2022-07-27 | Stop reason: HOSPADM

## 2022-07-22 RX ORDER — LEVETIRACETAM 500 MG/5ML
500 INJECTION, SOLUTION, CONCENTRATE INTRAVENOUS ONCE
Status: COMPLETED | OUTPATIENT
Start: 2022-07-22 | End: 2022-07-22

## 2022-07-22 RX ORDER — ATROPINE SULFATE 0.1 MG/ML
INJECTION INTRAVENOUS
Status: COMPLETED
Start: 2022-07-22 | End: 2022-07-22

## 2022-07-22 RX ORDER — ACETAMINOPHEN 325 MG/1
650 TABLET ORAL EVERY 8 HOURS PRN
Status: DISCONTINUED | OUTPATIENT
Start: 2022-07-22 | End: 2022-07-27 | Stop reason: HOSPADM

## 2022-07-22 RX ORDER — LEVETIRACETAM 500 MG/1
500 TABLET ORAL 2 TIMES DAILY
Status: DISCONTINUED | OUTPATIENT
Start: 2022-07-22 | End: 2022-07-24

## 2022-07-22 RX ORDER — GLUCAGON 1 MG
1 KIT INJECTION
Status: DISCONTINUED | OUTPATIENT
Start: 2022-07-22 | End: 2022-07-23

## 2022-07-22 RX ORDER — TALC
6 POWDER (GRAM) TOPICAL NIGHTLY PRN
Status: DISCONTINUED | OUTPATIENT
Start: 2022-07-22 | End: 2022-07-27 | Stop reason: HOSPADM

## 2022-07-22 RX ORDER — MIDAZOLAM HYDROCHLORIDE 1 MG/ML
4 INJECTION INTRAMUSCULAR; INTRAVENOUS
Status: DISCONTINUED | OUTPATIENT
Start: 2022-07-22 | End: 2022-07-27 | Stop reason: HOSPADM

## 2022-07-22 RX ORDER — HEPARIN SODIUM 1000 [USP'U]/ML
2800 INJECTION, SOLUTION INTRAVENOUS; SUBCUTANEOUS
Status: DISCONTINUED | OUTPATIENT
Start: 2022-07-22 | End: 2022-07-27 | Stop reason: HOSPADM

## 2022-07-22 RX ORDER — PANTOPRAZOLE SODIUM 40 MG/1
40 TABLET, DELAYED RELEASE ORAL DAILY
Status: DISCONTINUED | OUTPATIENT
Start: 2022-07-22 | End: 2022-07-24

## 2022-07-22 RX ADMIN — NOREPINEPHRINE BITARTRATE 0.5 MCG/KG/MIN: 1 INJECTION, SOLUTION, CONCENTRATE INTRAVENOUS at 08:07

## 2022-07-22 RX ADMIN — LEVETIRACETAM 500 MG: 100 INJECTION, SOLUTION INTRAVENOUS at 10:07

## 2022-07-22 RX ADMIN — FENTANYL CITRATE 100 MCG: 50 INJECTION, SOLUTION INTRAMUSCULAR; INTRAVENOUS at 03:07

## 2022-07-22 RX ADMIN — SUCCINYLCHOLINE CHLORIDE 120 MG: 20 INJECTION, SOLUTION INTRAMUSCULAR; INTRAVENOUS at 03:07

## 2022-07-22 RX ADMIN — MORPHINE SULFATE 4 MG: 4 INJECTION, SOLUTION INTRAMUSCULAR; INTRAVENOUS at 03:07

## 2022-07-22 RX ADMIN — CEFEPIME HYDROCHLORIDE 1 G: 1 INJECTION, SOLUTION INTRAVENOUS at 01:07

## 2022-07-22 RX ADMIN — HYDROMORPHONE HYDROCHLORIDE 1 MG: 2 INJECTION INTRAMUSCULAR; INTRAVENOUS; SUBCUTANEOUS at 10:07

## 2022-07-22 RX ADMIN — PROPOFOL 50 MCG/KG/MIN: 10 INJECTION, EMULSION INTRAVENOUS at 08:07

## 2022-07-22 RX ADMIN — FUROSEMIDE 40 MG: 10 INJECTION, SOLUTION INTRAMUSCULAR; INTRAVENOUS at 10:07

## 2022-07-22 RX ADMIN — Medication 10 ML: at 10:07

## 2022-07-22 RX ADMIN — Medication 4 MG: at 05:07

## 2022-07-22 RX ADMIN — Medication 4 MG: at 07:07

## 2022-07-22 RX ADMIN — PROPOFOL 5 MCG/KG/MIN: 10 INJECTION, EMULSION INTRAVENOUS at 04:07

## 2022-07-22 RX ADMIN — SODIUM CHLORIDE 5 UNITS/HR: 9 INJECTION, SOLUTION INTRAVENOUS at 04:07

## 2022-07-22 RX ADMIN — MORPHINE SULFATE 4 MG: 4 INJECTION INTRAVENOUS at 03:07

## 2022-07-22 RX ADMIN — PROPOFOL 100 MG: 10 INJECTION, EMULSION INTRAVENOUS at 03:07

## 2022-07-22 RX ADMIN — PROMETHAZINE HYDROCHLORIDE 12.5 MG: 25 INJECTION INTRAMUSCULAR; INTRAVENOUS at 10:07

## 2022-07-22 RX ADMIN — Medication 10 ML: at 02:07

## 2022-07-22 RX ADMIN — ROCURONIUM BROMIDE 20 MG: 10 INJECTION, SOLUTION INTRAVENOUS at 03:07

## 2022-07-22 RX ADMIN — HEPARIN SODIUM 5000 UNITS: 5000 INJECTION INTRAVENOUS; SUBCUTANEOUS at 10:07

## 2022-07-22 RX ADMIN — MIDAZOLAM 2 MG: 1 INJECTION INTRAMUSCULAR; INTRAVENOUS at 06:07

## 2022-07-22 RX ADMIN — VANCOMYCIN HYDROCHLORIDE 1500 MG: 1.5 INJECTION, POWDER, LYOPHILIZED, FOR SOLUTION INTRAVENOUS at 11:07

## 2022-07-22 RX ADMIN — INSULIN HUMAN 5 UNITS: 100 INJECTION, SOLUTION PARENTERAL at 11:07

## 2022-07-22 RX ADMIN — MIDAZOLAM 2 MG: 1 INJECTION INTRAMUSCULAR; INTRAVENOUS at 03:07

## 2022-07-22 RX ADMIN — ONDANSETRON 4 MG: 2 INJECTION INTRAMUSCULAR; INTRAVENOUS at 02:07

## 2022-07-22 RX ADMIN — LEVOFLOXACIN 500 MG: 500 INJECTION, SOLUTION INTRAVENOUS at 10:07

## 2022-07-22 RX ADMIN — HEPARIN SODIUM 2800 UNITS: 1000 INJECTION, SOLUTION INTRAVENOUS; SUBCUTANEOUS at 04:07

## 2022-07-22 RX ADMIN — MUPIROCIN: 20 OINTMENT TOPICAL at 10:07

## 2022-07-22 NOTE — ASSESSMENT & PLAN NOTE
Consulting nephrologist for routine hemodialysis therapy.  Patient will need replacement of malfunctioning hemodialysis catheter soon.

## 2022-07-22 NOTE — CONSULTS
INPATIENT NEPHROLOGY CONSULT   [facility] NEPHROLOGY    Tabby Howard  07/22/2022    Reason for consultation:    ESRD    Chief Complaint:   Chief Complaint   Patient presents with    Abdominal Pain     Patient presented to pre-op for dialysis cath changed and was in extreme abdominal pain and was brought to the er           History of Present Illness:    Pt is a 31 yo woman with h/o ESRD on hemodialysis, diabetes, hypertension, anemia,  Supraventricular tachycardia, heart failure with preserved ejection fraction, sickle cell trait, and secondary hyperparathyroidism who was referred to the emergency room from her outpatient dialysis unit.  She experienced bleeding from her hemosplit catheter sight while undergoing dialysis.  I called the outpatient unit and they stated that she starting bleeding and her shirt was covered in blood and there was a puddle on the floor.  The bleeding stopped when hemodialysis was discontinued.  She isn't very cooperative with questioning.  She states she has nausea, shortness of breath and weakness. She denies chest pain, fever, neurologic deficits, or urinary or bowel complaints.          Plan of Care:       Assessment:    ESRD  --will pursue hemodialysis once new access obtained.  I spoke with Dr. Gan and he is planning on placing a new access this afternoon  --fluid restrict  --renal dose medication     hyperglycemia  --per primary    Anemia due to acute blood loss and esrd  --heparin free hd  --epogen with hd  --blood products at discretion of primary    Pneumonia  --cefepime and Vancomycin ordered  --oxygen supplementation as necessary    Hypertension  --fluid restrict  --uf with hd  --low salt diet    Hyponatremia  --140 Na dialysate  --fluid restict  --uf with hd    Metabolic acidosis  --35 bicarb bath      Thank you for allowing us to participate in this patient's care. We will continue to follow.    Vital Signs:  Temp Readings from Last 3 Encounters:   07/22/22 98.8 °F  (37.1 °C) (Oral)   22 98.7 °F (37.1 °C) (Temporal)   22 98 °F (36.7 °C)       Pulse Readings from Last 3 Encounters:   22 85   22 91   22 96       BP Readings from Last 3 Encounters:   22 (!) 140/91   22 (!) 153/95   22 (!) 160/99       Weight:  Wt Readings from Last 3 Encounters:   22 74.8 kg (164 lb 15.9 oz)   22 74.8 kg (165 lb)   22 76.1 kg (167 lb 12.3 oz)       Past Medical & Surgical History:  Past Medical History:   Diagnosis Date    CKD (chronic kidney disease), stage IV 2022    Diabetes mellitus due to underlying condition with unspecified complications 2022    Gastroparesis 2022    Heart failure with preserved ejection fraction 2022    EF 55% on 3/22    History of gastroesophageal reflux (GERD)     History of supraventricular tachycardia     Hyperkalemia 2022    Hypertensive emergency 2022    Sickle cell trait 2022    Type 2 diabetes mellitus        Past Surgical History:   Procedure Laterality Date     SECTION      x 3    ESOPHAGOGASTRODUODENOSCOPY N/A 10/18/2019    Procedure: ESOPHAGOGASTRODUODENOSCOPY (EGD);  Surgeon: Gianluca Mendez MD;  Location: [facility];  Service: Endoscopy;  Laterality: N/A;    PLACEMENT OF DUAL-LUMEN VASCULAR CATHETER Left 2022    Procedure: INSERTION-CATHETER-JOSEPH;  Surgeon: Dionte Gan MD;  Location: [facility];  Service: General;  Laterality: Left;       Past Social History:  Social History     Socioeconomic History    Marital status:    Tobacco Use    Smoking status: Never Smoker    Smokeless tobacco: Never Used   Substance and Sexual Activity    Alcohol use: No    Drug use: No    Sexual activity: Not Currently     Partners: Male     Birth control/protection: I.U.D.       Medications:  Current Facility-Administered Medications on File Prior to Encounter   Medication Dose Route Frequency Provider Last Rate Last Admin    [COMPLETED]  ondansetron injection 4 mg  4 mg Intravenous Once Felix Reinoso MD   4 mg at 22    [DISCONTINUED] 0.9%  NaCl infusion   Intravenous Continuous Dionte Gan MD 70 mL/hr at 22 70 mL at 22    [DISCONTINUED] clindamycin in D5W 900 mg/50 mL IVPB 900 mg  900 mg Intravenous On Call Procedure Dionte Gan MD        [DISCONTINUED] electrolyte-S (ISOLYTE)   Intravenous Continuous Felix Reinoso MD        [DISCONTINUED] famotidine (PF) injection 20 mg  20 mg Intravenous Once Felix Reinoso MD        [DISCONTINUED] fentaNYL 50 mcg/mL injection 25 mcg  25 mcg Intravenous Q10 Min PRN Felix Reinoso MD   25 mcg at 22    [DISCONTINUED] lactated ringers infusion   Intravenous Continuous Felix Reinoso MD        [DISCONTINUED] LIDOcaine (PF) 10 mg/ml (1%) injection 10 mg  1 mL Intradermal Once Felix Reinoso MD         Current Outpatient Medications on File Prior to Encounter   Medication Sig Dispense Refill    amLODIPine (NORVASC) 10 MG tablet Take 1 tablet (10 mg total) by mouth once daily. 30 tablet 11    atenoloL (TENORMIN) 50 MG tablet Take 1 tablet (50 mg total) by mouth every other day. 45 tablet 3    famotidine (PEPCID) 20 MG tablet Take 1 tablet (20 mg total) by mouth once daily. 30 tablet 0    FLUoxetine 20 MG capsule Take 1 capsule (20 mg total) by mouth once daily. 30 capsule 1    HYDROcodone-acetaminophen (NORCO) 5-325 mg per tablet Take 1 tablet by mouth every 6 (six) hours as needed for Pain. 20 tablet 0    insulin aspart U-100 (NOVOLOG) 100 unit/mL (3 mL) InPn pen Inject 1-10 Units into the skin before meals and at bedtime as needed (Hyperglycemia). 3 mL 3    isosorbide mononitrate (IMDUR) 60 MG 24 hr tablet Take 2 tablets (120 mg total) by mouth once daily. 30 tablet 1    LANTUS U-100 INSULIN 100 unit/mL injection SMARTSI Unit(s) SUB-Q Every Morning      levETIRAcetam (KEPPRA) 500 MG Tab Take 1 tablet (500 mg total) by mouth 2 (two) times  "daily. 60 tablet 1    polyethylene glycol (GLYCOLAX) 17 gram/dose powder Take 17 g by mouth 2 (two) times daily. 510 each 0    predniSONE (DELTASONE) 20 MG tablet Take 3 tablets (60 mg total) by mouth once daily for 4 days, THEN 2 tablets (40 mg total) once daily for 4 days, THEN 1 tablet (20 mg total) once daily for 4 days, THEN 0.5 tablets (10 mg total) once daily for 4 days. 26 tablet 0    [DISCONTINUED] furosemide (LASIX) 40 MG tablet Take 1 tablet (40 mg total) by mouth 2 (two) times daily. 60 tablet 0    [DISCONTINUED] pantoprazole (PROTONIX) 40 MG tablet Take 1 tablet (40 mg total) by mouth once daily. 30 tablet 3    [DISCONTINUED] torsemide (DEMADEX) 20 MG Tab Take 1 tablet (20 mg total) by mouth 2 (two) times a day. (Patient taking differently: Take 20 mg by mouth once daily.) 60 tablet 1     Scheduled Meds:   ceFEPime (MAXIPIME) IVPB  1 g Intravenous ED 1 Time    insulin regular  20 Units Intravenous ED 1 Time    mupirocin   Nasal BID     Continuous Infusions:  PRN Meds:.    Allergies:  Penicillins    Past Family History:  Reviewed; refer to Hospitalist Admission Note    Review of Systems:  Review of Systems - All 14 systems reviewed and negative, except as noted in HPI    Physical Exam:    BP (!) 140/91   Pulse 85   Temp 98.8 °F (37.1 °C) (Oral)   Resp 20   Ht 5' 2" (1.575 m)   Wt 74.8 kg (164 lb 15.9 oz)   SpO2 97%   Breastfeeding No   BMI 30.18 kg/m²     General Appearance:   ill appearing   Head:    Normocephalic, without obvious abnormality, atraumatic   Eyes:    PER, conjunctiva/corneas clear, EOM's intact in both eyes        Throat:   Lips, mucosa, and tongue normal; teeth and gums normal   Back:     Symmetric, no curvature, ROM normal, no CVA tenderness   Lungs:     Right sided rhonchi   Chest wall:    No tenderness or deformity   Heart:    Regular rate and rhythm, S1 and S2 normal, no murmur, rub   or gallop   Abdomen:     Tender.  No rigidity or guarding.    Extremities:   " Extremities normal, atraumatic, no cyanosis or edema   Pulses:   2+ and symmetric all extremities   MSK:   No joint or muscle swelling, tenderness or deformity   Skin:   Skin color, texture, turgor normal, no rashes or lesions   Neurologic:      No flap     Results:  Lab Results   Component Value Date     (L) 07/22/2022    K 4.1 07/22/2022     07/22/2022    CO2 18 (L) 07/22/2022    BUN 50 (H) 07/22/2022    CREATININE 4.4 (H) 07/22/2022    CALCIUM 7.8 (L) 07/22/2022    ANIONGAP 14 07/22/2022    ESTGFRAFRICA 14 (A) 07/22/2022    EGFRNONAA 12 (A) 07/22/2022       Lab Results   Component Value Date    CALCIUM 7.8 (L) 07/22/2022    PHOS 4.4 07/07/2022       Recent Labs   Lab 07/22/22  1008   WBC 24.50*   RBC 2.77*   HGB 8.2*   HCT 22.8*      MCV 82   MCH 29.6   MCHC 36.0          I have personally reviewed pertinent radiological imaging and reports.    Patient care was time spent personally by me on the following activities:   · Obtaining a history  · Examination of patient.  · Providing medical care at the patients bedside.  · Developing a treatment plan with patient or surrogate and bedside caregivers  · Ordering and reviewing laboratory studies, radiographic studies, pulse oximetry.  · Ordering and performing treatments and interventions.  · Evaluation of patient's response to treatment.  · Discussions with consultants while on the unit and immediately available to the patient.  · Re-evaluation of the patient's condition.  · Documentation in the medical record.       Hugh Figueroa MD  Nephrology  [facility] [place]  [phone]

## 2022-07-22 NOTE — NURSING
Glucose level by accucheck noted to be greater than 500- order received from Dr Patel to double check glucose by lab verification rather than repeat accucheck specimen.

## 2022-07-22 NOTE — H&P
[facility] Hospital Medicine  History & Physical    Patient Name: Tabby Howard  MRN: [id number]  Patient Class: IP- Inpatient  Admission Date: 7/22/2022  Attending Physician: Tosin Roberts MD   Primary Care Provider: Primary Doctor No         Patient information was obtained from patient and ER records.     Subjective:     Principal Problem:Acute hypoxemic respiratory failure    Chief Complaint:   Chief Complaint   Patient presents with    Abdominal Pain     Patient presented to pre-op for dialysis cath changed and was in extreme abdominal pain and was brought to the er         HPI: Patient is a 32-year-old  female with past medical history significant for sickle cell anemia, end-stage renal disease (on hemodialysis every Tuesday/Thursday/Saturday), diabetes mellitus type 2, gastroparesis and hypertension who is being admitted to Hospital Medicine under inpatient status from [facility] Emergency Room where patient presented with worsening shortness of Breath started this morning.  Patient was scheduled to undergo removal of malfunctioning Paolinar catheter.  Patient reported compliance with dialysis therapy, patient did receive hemodialysis yesterday.  Patient has been coughing up purulent expectoration.  Denies any associated fevers or chills.  At present, patient is in significant respiratory distress and not able to provide further meaningful history of present illness.  Patient has 100% non-rebreather placed.  Patient denies any chest pain or palpitations.  Patient is reporting frequent nausea and abdominal pain.  Patient's blood sugars noted to be in excess of 600 mg%.      Past Medical History:   Diagnosis Date    CKD (chronic kidney disease), stage IV 4/12/2022    Diabetes mellitus due to underlying condition with unspecified complications 4/12/2022    Gastroparesis 4/12/2022    Heart failure with preserved ejection fraction 4/12/2022    EF 55% on  3/22    History of gastroesophageal reflux (GERD)     History of supraventricular tachycardia     Hyperkalemia 2022    Hypertensive emergency 2022    Sickle cell trait 2022    Type 2 diabetes mellitus        Past Surgical History:   Procedure Laterality Date     SECTION      x 3    ESOPHAGOGASTRODUODENOSCOPY N/A 10/18/2019    Procedure: ESOPHAGOGASTRODUODENOSCOPY (EGD);  Surgeon: Gianluca Mendez MD;  Location: [facility];  Service: Endoscopy;  Laterality: N/A;    PLACEMENT OF DUAL-LUMEN VASCULAR CATHETER Left 2022    Procedure: INSERTION-CATHETER-JOSEPH;  Surgeon: Dionte Gan MD;  Location: [facility];  Service: General;  Laterality: Left;       Review of patient's allergies indicates:   Allergen Reactions    Penicillins Hives       Current Facility-Administered Medications on File Prior to Encounter   Medication    [COMPLETED] ondansetron injection 4 mg    [DISCONTINUED] 0.9%  NaCl infusion    [DISCONTINUED] clindamycin in D5W 900 mg/50 mL IVPB 900 mg    [DISCONTINUED] electrolyte-S (ISOLYTE)    [DISCONTINUED] famotidine (PF) injection 20 mg    [DISCONTINUED] fentaNYL 50 mcg/mL injection 25 mcg    [DISCONTINUED] lactated ringers infusion    [DISCONTINUED] LIDOcaine (PF) 10 mg/ml (1%) injection 10 mg     Current Outpatient Medications on File Prior to Encounter   Medication Sig    amLODIPine (NORVASC) 10 MG tablet Take 1 tablet (10 mg total) by mouth once daily.    atenoloL (TENORMIN) 50 MG tablet Take 1 tablet (50 mg total) by mouth every other day.    famotidine (PEPCID) 20 MG tablet Take 1 tablet (20 mg total) by mouth once daily.    FLUoxetine 20 MG capsule Take 1 capsule (20 mg total) by mouth once daily.    HYDROcodone-acetaminophen (NORCO) 5-325 mg per tablet Take 1 tablet by mouth every 6 (six) hours as needed for Pain.    insulin aspart U-100 (NOVOLOG) 100 unit/mL (3 mL) InPn pen Inject 1-10 Units into the skin before meals and at bedtime as needed  (Hyperglycemia).    isosorbide mononitrate (IMDUR) 60 MG 24 hr tablet Take 2 tablets (120 mg total) by mouth once daily.    LANTUS U-100 INSULIN 100 unit/mL injection SMARTSI Unit(s) SUB-Q Every Morning    levETIRAcetam (KEPPRA) 500 MG Tab Take 1 tablet (500 mg total) by mouth 2 (two) times daily.    polyethylene glycol (GLYCOLAX) 17 gram/dose powder Take 17 g by mouth 2 (two) times daily.    predniSONE (DELTASONE) 20 MG tablet Take 3 tablets (60 mg total) by mouth once daily for 4 days, THEN 2 tablets (40 mg total) once daily for 4 days, THEN 1 tablet (20 mg total) once daily for 4 days, THEN 0.5 tablets (10 mg total) once daily for 4 days.    [DISCONTINUED] furosemide (LASIX) 40 MG tablet Take 1 tablet (40 mg total) by mouth 2 (two) times daily.    [DISCONTINUED] pantoprazole (PROTONIX) 40 MG tablet Take 1 tablet (40 mg total) by mouth once daily.    [DISCONTINUED] torsemide (DEMADEX) 20 MG Tab Take 1 tablet (20 mg total) by mouth 2 (two) times a day. (Patient taking differently: Take 20 mg by mouth once daily.)     Family History       Problem Relation (Age of Onset)    Diabetes Mother, Father          Tobacco Use    Smoking status: Never Smoker    Smokeless tobacco: Never Used   Substance and Sexual Activity    Alcohol use: No    Drug use: No    Sexual activity: Not Currently     Partners: Male     Birth control/protection: I.U.D.     Review of Systems   Constitutional:  Positive for appetite change, diaphoresis and fatigue.   Respiratory:  Positive for cough, shortness of breath and wheezing.    Gastrointestinal:  Positive for abdominal pain, nausea and vomiting.   Neurological:  Positive for weakness.   All other systems reviewed and are negative.  Objective:     Vital Signs (Most Recent):  Temp: 98.8 °F (37.1 °C) (22 0940)  Pulse: 78 (22 1405)  Resp: (!) 22 (22 1405)  BP: 115/66 (22 1401)  SpO2: (!) 84 % (22 1405)   Vital Signs (24h Range):  Temp:  [98.7 °F (37.1  °C)-98.8 °F (37.1 °C)] 98.8 °F (37.1 °C)  Pulse:  [78-92] 78  Resp:  [17-80] 22  SpO2:  [84 %-100 %] 84 %  BP: (104-155)/(66-99) 115/66     Weight: 74.8 kg (164 lb 15.9 oz)  Body mass index is 30.18 kg/m².    Physical Exam  Constitutional:       Appearance: She is well-developed.      Comments: In distress, respiratory distress as well   HENT:      Head: Normocephalic and atraumatic.   Eyes:      Conjunctiva/sclera: Conjunctivae normal.      Pupils: Pupils are equal, round, and reactive to light.   Neck:      Thyroid: No thyromegaly.      Vascular: No JVD.   Cardiovascular:      Rate and Rhythm: Normal rate and regular rhythm.      Heart sounds: No murmur heard.    No friction rub. No gallop.   Pulmonary:      Effort: Pulmonary effort is normal.      Breath sounds: Wheezing present.      Comments: In respiratory distress, on non-rebreather 100%  Abdominal:      General: Bowel sounds are normal. There is no distension.      Palpations: Abdomen is soft. There is no mass.      Tenderness: There is no abdominal tenderness.   Musculoskeletal:         General: Normal range of motion.      Cervical back: Neck supple.   Skin:     General: Skin is warm and dry.   Neurological:      Mental Status: She is oriented to person, place, and time.      Cranial Nerves: No cranial nerve deficit.   Psychiatric:         Behavior: Behavior normal.         CRANIAL NERVES     CN III, IV, VI   Pupils are equal, round, and reactive to light.     Significant Labs: All pertinent labs within the past 24 hours have been reviewed.  CBC:   Recent Labs   Lab 07/22/22  1008   WBC 24.50*   HGB 8.2*   HCT 22.8*        CMP:   Recent Labs   Lab 07/22/22  1008 07/22/22  1358   *  --    K 4.1  --      --    CO2 18*  --    * 615*   BUN 50*  --    CREATININE 4.4*  --    CALCIUM 7.8*  --    ANIONGAP 14  --    EGFRNONAA 12*  --      Lactic Acid:   Recent Labs   Lab 07/22/22  1112 07/22/22  1358   LACTATE 0.8 1.1     Lipase: No  "results for input(s): LIPASE in the last 48 hours.  Magnesium: No results for input(s): MG in the last 48 hours.  POCT Glucose:   Recent Labs   Lab 07/22/22  1313 07/22/22  1317 07/22/22  1427   POCTGLUCOSE >500* >500* >500*     Troponin: No results for input(s): TROPONINI in the last 48 hours.  TSH:   Recent Labs   Lab 07/08/22  1517   TSH 1.866     Urine Studies: No results for input(s): COLORU, APPEARANCEUA, PHUR, SPECGRAV, PROTEINUA, GLUCUA, KETONESU, BILIRUBINUA, OCCULTUA, NITRITE, UROBILINOGEN, LEUKOCYTESUR, RBCUA, WBCUA, BACTERIA, SQUAMEPITHEL, HYALINECASTS in the last 48 hours.    Invalid input(s): WRIGHTSUR    Significant Imaging:   CXR: New right basilar opacification suggesting pneumonia.  Interval removal of the right-sided central venous catheter and the left-sided central venous catheter has partially retracted with the distal tip now at the level of the confluence of the left brachiocephalic vein and SVC    Assessment/Plan:     * Acute hypoxemic respiratory failure  Continue supplemental oxygen to maintain pulse ox above 92%.  Use BiPAP therapy as needed.  Supplemental O2 via nasal canula; titrate O2 saturation to >92%.   Pulmonary consultation.   Continue beta 2 agonist bronchodilator treatments.   Continue IV antibiotics Levaquin and vancomycin.  Pharmacist to dose Vancomycin.  Patient is penicillin allergic.  Check sputum GS and Cx.   Continue routine medications as before.             Hyperglycemia without ketosis  Initiate intravenous insulin infusion per protocol.  Check hemoglobin A1c.      ESRD (end stage renal disease)  Consulting nephrologist for routine hemodialysis therapy.  Patient will need replacement of malfunctioning hemodialysis catheter soon.      HCAP (healthcare-associated pneumonia), RLL  As above.  See "acute hypoxemic respiratory failure" management.      Anemia  Anemia of chronic disease and sickle cell anemia.  Monitor serial CBC and transfuse if patient becomes " hemodynamically unstable, symptomatic or H/H drops below 7/21.        Seizure  Continue antiseizure medications and follow seizure precautions.      Gastroparesis  Supportive care with antiemetics and intravenous narcotics for pain control.      Type II diabetes mellitus  Patient's FSGs are uncontrolled due to hyperglycemia on current medication regimen.  Last A1c reviewed-   Lab Results   Component Value Date    HGBA1C 5.8 (H) 05/15/2022     Most recent fingerstick glucose reviewed-   Recent Labs   Lab 07/22/22  1313 07/22/22  1317 07/22/22  1427   POCTGLUCOSE >500* >500* >500*     Current correctional scale  High, starting IV Insulin infusion  Increase anti-hyperglycemic dose as follows-   Antihyperglycemics (From admission, onward)            Start     Stop Route Frequency Ordered    07/22/22 1600  insulin regular 1 Units/mL in sodium chloride 0.9% 100 mL infusion        Question Answer Comment   Insulin Rate Adjustment (DO NOT MODIFY ANSWER) \\[email or web address] [id number]    Enter initial dose from Infusion Protocol Chart (Units/hr): 5        -- IV Continuous 07/22/22 1448    07/22/22 1434  insulin aspart U-100 pen 0-5 Units         -- SubQ Before meals & nightly PRN 07/22/22 1434    07/22/22 1345  insulin regular injection 20 Units 0.2 mL         07/23 0144 IV ED 1 Time 07/22/22 1334        Hold Oral hypoglycemics while patient is in the hospital.      VTE Risk Mitigation (From admission, onward)         Ordered     heparin (porcine) injection 5,000 Units  Every 8 hours         07/22/22 1434     IP VTE HIGH RISK PATIENT  Once         07/22/22 1434     Place sequential compression device  Until discontinued         07/22/22 1434              Critical care time spent on the evaluation and treatment of severe organ dysfunction, review of pertinent labs and imaging studies, discussions with consulting providers and discussions with patient/family: 33 minutes.     Antonioib  MD Armando  Department of Hospital Medicine   [facility]

## 2022-07-22 NOTE — ASSESSMENT & PLAN NOTE
Anemia of chronic disease and sickle cell anemia.  Monitor serial CBC and transfuse if patient becomes hemodynamically unstable, symptomatic or H/H drops below 7/21.

## 2022-07-22 NOTE — ASSESSMENT & PLAN NOTE
Patient's FSGs are uncontrolled due to hyperglycemia on current medication regimen.  Last A1c reviewed-   Lab Results   Component Value Date    HGBA1C 5.8 (H) 05/15/2022     Most recent fingerstick glucose reviewed-   Recent Labs   Lab 07/22/22  1313 07/22/22  1317 07/22/22  1427   POCTGLUCOSE >500* >500* >500*     Current correctional scale  High, starting IV Insulin infusion  Increase anti-hyperglycemic dose as follows-   Antihyperglycemics (From admission, onward)            Start     Stop Route Frequency Ordered    07/22/22 1600  insulin regular 1 Units/mL in sodium chloride 0.9% 100 mL infusion        Question Answer Comment   Insulin Rate Adjustment (DO NOT MODIFY ANSWER) \\[email or web address]\InsulinInfusions\[email or web address] [id number]    Enter initial dose from Infusion Protocol Chart (Units/hr): 5        -- IV Continuous 07/22/22 1448    07/22/22 1434  insulin aspart U-100 pen 0-5 Units         -- SubQ Before meals & nightly PRN 07/22/22 1434    07/22/22 1345  insulin regular injection 20 Units 0.2 mL         07/23 0144 IV ED 1 Time 07/22/22 1334        Hold Oral hypoglycemics while patient is in the hospital.

## 2022-07-22 NOTE — CONSULTS
18 Gx 10cm PowerGlide Midline placed to pts right brachial vein with the use of ultrasound guidance.    Ultrasound guidance: yes  Vessel Caliber: large and patent, compressibility normal  Needle advanced into vessel with real time Ultrasound guidance.  Guidewire confirmed in vessel.  Image recorded and saved.  Sterile sheath used.  Sterile dressing applied  Arm circumference- 24.5 cm  Dressing dated   Education provided to patient re: proper maintenance of line- pt verbalized understanding

## 2022-07-22 NOTE — ED PROVIDER NOTES
Encounter Date: 2022       History     Chief Complaint   Patient presents with    Abdominal Pain     Patient presented to pre-op for dialysis cath changed and was in extreme abdominal pain and was brought to the er      32-year-old female just discharged from this facility presents from preop for hypoxia.  She is chronically ill, she has diabetes, hyperkalemia, heart failure with preserved EF, gastroparesis.  She is on dialysis.  History from the patient is unobtainable.  She is distressed and requesting pain medications.  Patient cannot give any history at all.    The history is provided by the patient. The history is limited by the condition of the patient.     Review of patient's allergies indicates:   Allergen Reactions    Penicillins Hives     Past Medical History:   Diagnosis Date    CKD (chronic kidney disease), stage IV 2022    Diabetes mellitus due to underlying condition with unspecified complications 2022    Gastroparesis 2022    Heart failure with preserved ejection fraction 2022    EF 55% on 3/22    History of gastroesophageal reflux (GERD)     History of supraventricular tachycardia     Hyperkalemia 2022    Hypertensive emergency 2022    Sickle cell trait 2022    Type 2 diabetes mellitus      Past Surgical History:   Procedure Laterality Date     SECTION      x 3    ESOPHAGOGASTRODUODENOSCOPY N/A 10/18/2019    Procedure: ESOPHAGOGASTRODUODENOSCOPY (EGD);  Surgeon: Gianluca Mendez MD;  Location: [facility];  Service: Endoscopy;  Laterality: N/A;    PLACEMENT OF DUAL-LUMEN VASCULAR CATHETER Left 2022    Procedure: INSERTION-CATHETER-JOSEPH;  Surgeon: Dionte Gan MD;  Location: [facility];  Service: General;  Laterality: Left;     Family History   Problem Relation Age of Onset    Diabetes Mother     Diabetes Father      Social History     Tobacco Use    Smoking status: Never Smoker    Smokeless tobacco: Never Used   Substance Use  Topics    Alcohol use: No    Drug use: No     Review of Systems   Unable to perform ROS: Other       Physical Exam     Initial Vitals   BP Pulse Resp Temp SpO2   07/22/22 1001 07/22/22 1001 07/22/22 0940 07/22/22 0940 07/22/22 0940   (!) 155/99 89 17 98.8 °F (37.1 °C) (!) 94 %      MAP       --                Physical Exam    Nursing note and vitals reviewed.  Constitutional: She appears well-developed and well-nourished. She has a sickly appearance. She appears ill. She appears distressed.   Chronically ill-appearing.  Moaning and wailing.  Writhing around on the gurney.   HENT:   Head: Normocephalic and atraumatic.   Eyes: EOM are normal.   Periorbital edema   Neck: Neck supple.   Normal range of motion.  Cardiovascular: Normal rate, regular rhythm and normal heart sounds. Exam reveals no gallop and no friction rub.    No murmur heard.There is a central line which is a eugene in the left subclavian.   Pulmonary/Chest: Breath sounds normal. No respiratory distress. She has no wheezes. She has no rhonchi. She has no rales.   Breath sounds are clear   Abdominal: Abdomen is soft. She exhibits no distension. There is no abdominal tenderness.   Musculoskeletal:         General: Normal range of motion.      Cervical back: Normal range of motion and neck supple.     Neurological: She is alert.   Skin: Skin is warm and dry.   Generalized mild anasarca   Psychiatric: She has a normal mood and affect. Her behavior is normal. Judgment and thought content normal.         ED Course   Critical Care    Date/Time: 7/22/2022 6:30 PM  Performed by: Elfego Manzo MD  Authorized by: Tosin Roberts MD   Direct patient critical care time: 75 minutes  Additional history critical care time: 20 minutes  Ordering / reviewing critical care time: 8 minutes  Documentation critical care time: 10 minutes  Consulting other physicians critical care time: 12 (surgery, nephrology, hospital medicine) minutes  Total critical care time (exclusive  of procedural time) : 125 minutes  Critical care was necessary to treat or prevent imminent or life-threatening deterioration of the following conditions: sepsis.  Critical care was time spent personally by me on the following activities: discussions with consultants, evaluation of patient's response to treatment, obtaining history from patient or surrogate, ordering and review of laboratory studies, review of old charts, pulse oximetry, examination of patient, ordering and performing treatments and interventions, re-evaluation of patient's condition and ordering and review of radiographic studies.        Labs Reviewed   CBC W/ AUTO DIFFERENTIAL - Abnormal; Notable for the following components:       Result Value    WBC 24.50 (*)     RBC 2.77 (*)     Hemoglobin 8.2 (*)     Hematocrit 22.8 (*)     RDW 15.3 (*)     Immature Granulocytes 1.2 (*)     Gran # (ANC) 22.6 (*)     Immature Grans (Abs) 0.30 (*)     Lymph # 0.6 (*)     Gran % 92.2 (*)     Lymph % 2.6 (*)     Mono % 3.8 (*)     All other components within normal limits   BASIC METABOLIC PANEL - Abnormal; Notable for the following components:    Sodium 134 (*)     CO2 18 (*)     Glucose 601 (*)     BUN 50 (*)     Creatinine 4.4 (*)     Calcium 7.8 (*)     eGFR if  14 (*)     eGFR if non  12 (*)     All other components within normal limits    Narrative:      glucose  critical result(s) called and verbal readback obtained from   Megan Lugo by RUTH 07/22/2022 10:51   POCT GLUCOSE - Abnormal; Notable for the following components:    POCT Glucose >500 (*)     All other components within normal limits   POCT GLUCOSE - Abnormal; Notable for the following components:    POCT Glucose >500 (*)     All other components within normal limits   CULTURE, BLOOD   CULTURE, BLOOD   LACTIC ACID, PLASMA        ECG Results          EKG 12-lead (In process)  Result time 07/22/22 10:54:30    In process by Aye, [facility] (07/22/22  10:54:30)                 Narrative:    Test Reason : R10.9,    Vent. Rate : 092 BPM     Atrial Rate : 092 BPM     P-R Int : 166 ms          QRS Dur : 076 ms      QT Int : 368 ms       P-R-T Axes : 042 -16 034 degrees     QTc Int : 455 ms    Normal sinus rhythm  Possible Anterior infarct (cited on or before 13-DEC-2020)  Abnormal ECG  When compared with ECG of 07-JUL-2022 15:15,  No significant change was found    Referred By:  ED MD           Confirmed By:                             Imaging Results          X-Ray Chest AP Portable (Final result)  Result time 07/22/22 10:34:55    Final result by Sunshine Awan MD (07/22/22 10:34:55)                 Impression:      New right basilar opacification suggesting pneumonia.    Interval removal of the right-sided central venous catheter and the left-sided central venous catheter has partially retracted with the distal tip now at the level of the confluence of the left brachiocephalic vein and SVC      Electronically signed by: Sunshine Awan MD  Date:    07/22/2022  Time:    10:34             Narrative:    EXAMINATION:  XR CHEST AP PORTABLE    CLINICAL HISTORY:  Chest Pain;    TECHNIQUE:  Single frontal view of the chest was performed.    COMPARISON:  1222    FINDINGS:  Interval development of right basilar opacification suggesting pneumonia.  The left lung remains clear.  Small right pleural effusion not appearing significantly changed.  Stable cardiomediastinal silhouette.  Right internal jugular venous catheter has been removed.  Left-sided central venous catheter appears repositioned reinserted appearing retracted compared to the prior exam.  The distal tip is not the level of the junction of the left brachiocephalic vein and SVC                                 Medications   amLODIPine tablet 10 mg (10 mg Oral Not Given 7/22/22 1445)   atenoloL tablet 50 mg (has no administration in time range)   famotidine tablet 20 mg (has no administration in time range)    FLUoxetine capsule 20 mg (has no administration in time range)   furosemide tablet 40 mg (has no administration in time range)   isosorbide mononitrate 24 hr tablet 120 mg (has no administration in time range)   levETIRAcetam tablet 500 mg (has no administration in time range)   pantoprazole EC tablet 40 mg (has no administration in time range)   polyethylene glycol packet 17 g (has no administration in time range)   sodium chloride 0.9% flush 10 mL (has no administration in time range)   melatonin tablet 6 mg (has no administration in time range)   polyethylene glycol packet 17 g (has no administration in time range)   acetaminophen tablet 650 mg (has no administration in time range)   HYDROcodone-acetaminophen 5-325 mg per tablet 1 tablet (has no administration in time range)   naloxone 0.4 mg/mL injection 0.02 mg (has no administration in time range)   potassium bicarbonate disintegrating tablet 50 mEq (has no administration in time range)   potassium bicarbonate disintegrating tablet 35 mEq (has no administration in time range)   potassium bicarbonate disintegrating tablet 60 mEq (has no administration in time range)   magnesium oxide tablet 800 mg (has no administration in time range)   magnesium oxide tablet 800 mg (has no administration in time range)   potassium, sodium phosphates 280-160-250 mg packet 2 packet (has no administration in time range)   potassium, sodium phosphates 280-160-250 mg packet 2 packet (has no administration in time range)   potassium, sodium phosphates 280-160-250 mg packet 2 packet (has no administration in time range)   insulin aspart U-100 pen 0-5 Units (has no administration in time range)   glucose chewable tablet 16 g (has no administration in time range)   glucose chewable tablet 24 g (has no administration in time range)   glucagon (human recombinant) injection 1 mg (has no administration in time range)   dextrose 10% bolus 125 mL (has no administration in time range)    dextrose 10% bolus 250 mL (has no administration in time range)   heparin (porcine) injection 5,000 Units (has no administration in time range)   ondansetron injection 4 mg (has no administration in time range)   folic acid tablet 1 mg (has no administration in time range)   vancomycin - pharmacy to dose (has no administration in time range)   levoFLOXacin 500 mg/100 mL IVPB 500 mg (has no administration in time range)   insulin regular injection 20 Units 0.2 mL (has no administration in time range)   mupirocin 2 % ointment (has no administration in time range)   0.9%  NaCl infusion (has no administration in time range)   0.9%  NaCl infusion (has no administration in time range)   sodium chloride 0.9% bolus 250 mL (has no administration in time range)   sodium chloride 0.9% flush 10 mL (has no administration in time range)   insulin regular 1 Units/mL in sodium chloride 0.9% 100 mL infusion (6 Units/hr Intravenous Rate/Dose Change 7/22/22 1809)   morphine 4 mg/mL injection (has no administration in time range)   propofol (DIPRIVAN) 10 mg/mL infusion (20 mcg/kg/min × 74.8 kg Intravenous Verify Only 7/22/22 1645)   midazolam (VERSED) 1 mg/mL injection 4 mg (2 mg Intravenous Given 7/22/22 1829)   HYDROmorphone (PF) injection 2 mg (has no administration in time range)   morphine injection 4 mg (has no administration in time range)   heparin (porcine) injection 2,800 Units (has no administration in time range)   EPINEPHrine (ADRENALIN) 0.1 mg/mL injection (has no administration in time range)   atropine 0.1 mg/mL injection (has no administration in time range)   NORepinephrine 32 mg in dextrose 5 % 250 mL infusion (has no administration in time range)   NORepinephrine bitartrate-D5W 4 mg/250 mL (16 mcg/mL) infusion Soln (0.06 mcg/kg/min  Rate/Dose Change 7/22/22 1820)   HYDROmorphone (PF) injection 1 mg (1 mg Intravenous Given 7/22/22 1012)   promethazine (PHENERGAN) 12.5 mg in dextrose 5 % 50 mL IVPB (0 mg Intravenous  Stopped 7/22/22 1037)   insulin regular injection 5 Units 0.05 mL (5 Units Intravenous Given 7/22/22 1105)   vancomycin 1.5 g in dextrose 5 % 250 mL IVPB (ready to mix) (0 mg/kg × 74.8 kg Intravenous Stopped 7/22/22 1311)   cefepime in dextrose 5 % 1 gram/50 mL IVPB 1 g (0 g Intravenous Stopped 7/22/22 1337)   propofoL (DIPRIVAN) 10 mg/mL infusion (  Override pull for Anesthesia 7/22/22 1527)   heparin (porcine) injection 3,000 Units (2,800 Units Intravenous Given 7/22/22 1622)                 ED Course as of 07/22/22 1831 Fri Jul 22, 2022   1004 Per dr miguel ángel oconnell to use eugene for access since very difficult access, sides are swapped so we will use arterial side for meds. [EF]   1040 X-Ray Chest AP Portable [EF]   1058 WBC(!): 24.50 [EF]   1058 Sodium(!): 134 [EF]   1058 Potassium: 4.1 [EF]   1058 Chloride: 102 [EF]   1058 CO2(!): 18 [EF]   1058 Glucose(!!): 601 [EF]   1058 BUN(!): 50 [EF]   1058 Creatinine(!): 4.4 [EF]   1116 Case d/w  breanna who will admit. [EF]   1119 32-year-old female presents to the emergency room from preop for hypoxia.  Apparently upstairs she had oxygen saturations in the 80s.  She was placed on a non-rebreather before she came down the emergency room and her oxygen saturations here were 100%.  Patient was in distress complaining of abdominal pain and vomiting consistent with gastroparesis.  Better at this time with medication.  Chest x-ray demonstrates right lower lobe infiltrate with elevated white count.  Elevated blood sugar but no DKA given normal anion gap.  Spoke with Nephrology who is agreeable to using the arterial side of the dialysis catheter for access as the patient has very poor venous access.  I will give IV insulin for her elevated blood sugar.  Blood cultures and lactic acid have been ordered.  Broad-spectrum antibiotics ordered for the pneumonia.  Hospital Medicine will admit.  We have reached out to midline/picc agency while in the ER for further access.  [EF]   1319  PICC inserted. Will go to ICU [EF]   1406 ? Hypoxia but probe not picking up well. Getting abg. [EF]   1409 Pa O2 30s on abg? Will place bipap and move to ICU.   [EF]      ED Course User Index  [EF] Elfego Manzo MD             Clinical Impression:   Final diagnoses:  [R10.9] Abdominal pain  [J96.00] Respiratory failure, acute          ED Disposition Condition    Admit               Elfego Manzo MD  07/22/22 0941

## 2022-07-22 NOTE — PLAN OF CARE
Pt arrived to ICU from ED via stretcher at 1415. Pt on NRB, sats noted to be in 30's w/ good waveform. RT at bedside to place on bipap. Pt following commands/nodding appropriately. Complaining of abd pain, no distention noted. Became nauseous - removed from bipap and placed back on NRB to prevent aspiration; red tinged emesis noted. Zofran and morphine admin. Pt restless/distressed stating she cannot breathe. Anesthesia and surgeon at bedside - verbal consent for intubation/trialysis.Too unstable to go to OR for line placement. Pt in ARF and unable to maintain sats >50%, emergent intubation preformed. Copious red tinged secretions suctioned from ETT. Pt unstable with labile secretions and multiple episodes of hypotension/bradycardia requiring epi and atropine admin. Dr. Gan at bedside to place trialysis catheter once stabilized.     Dr. Miller and  consulted/updated.

## 2022-07-22 NOTE — PROCEDURES
This is a 32-year-old female who was initially scheduled to have her hemodialysis line exchange today.  She was hypoxic and found to be in DKA on admission.  She decompensated in the ER became severely hypoxic.  She was emergently intubated in the ICU.  Patient became profoundly hypotensive and required epinephrine.  Given her instability, it was not safe to move her to the OR for tunneled line placement.  I elected instead to place a temporary hemodialysis line at bedside and remove her old HemoSplit line.    The right internal jugular vein was prepped and draped in the usual fashion.  Using ultrasound, the right internal jugular vein was cannulated with a hollow bore needle.  Wire was advanced and confirmed to be in appropriate location using ultrasound.  There was ectopy when the wire was advanced.  Dark venous blood was aspirated that was non pulsatile.  Using Seldinger technique, the wire was serially dilated and then exchanged for a Trialysis line.  All ports were aspirated and then flushed with heparinized saline with ease.  The line was secured to the neck using the supplied nylon suture.  A Biopatch and sterile dressing were applied.    Next the old HemoSplit line was prepped and draped in the usual fashion.  Sutures were cut.  The skin was dilated at the catheter exit site.  I was able to free the buried cuff with traction and the catheter was removed intact.  A dressing was applied.    Patient tolerated both procedures without apparent complication.  A postprocedure x-ray confirmed appropriate location of the new dialysis line.  Patient remained in the ICU, intubated and nearly maxed on the ventilator but hemodynamically stable

## 2022-07-22 NOTE — ANESTHESIA PROCEDURE NOTES
Ad Hoc Intubation    Date/Time: 7/22/2022 3:00 PM  Performed by: Bina Lance CRNA  Authorized by: Gianluca Patel MD     Indications:  Respiratory failure, hypoxemia and airway protection  Diagnosis:  ESRD, sickle cell trait, DKA  Provider Requesting Consult:  Elvia  Patient Location:  ICU  Timeout:  7/22/2022 3:00 PM  Procedure Start Time:  7/22/2022 3:00 PM  Procedure End Time:  7/22/2022 3:01 PM  Staff:     Other Anesthesia Staff:  Gianluca Patel MD    Anesthesiologist Present: Yes    Intubation:     Induction:  Rapid sequence induction    Intubated:  Postinduction    Mask Ventilation:  N/a    Attempts:  1    Attempted By:  CRNA    Method of Intubation:  Video laryngoscopy    Blade:  Bydr 3    Laryngeal View Grade: Grade I - full view of chords      Difficult Airway Encountered?: No      Complications:  None (gastric contents noted on vocal cords upon video laryngoscopy)    Airway Device:  Oral endotracheal tube    Airway Device Size:  7.5    Style/Cuff Inflation:  Cuffed (inflated to minimal occlusive pressure)    Tube secured:  22    Secured at:  The lips    Placement Verified By:  Colorimetric ETCO2 device    Complicating Factors:  None    Findings Post-Intubation:  BS equal bilateral and atraumatic/condition of teeth unchanged  Notes:      Upon arriving in ICU to transport patient to OR for procedure, patient noted to be agitated and in respiratory failure as evidenced by O2 sats around 40's and HR 30's with weak palpable pulse. Decision made by loreta delgado and marlen to intubate patient now. See above for details and flowsheet for medications.

## 2022-07-22 NOTE — SUBJECTIVE & OBJECTIVE
Past Medical History:   Diagnosis Date    CKD (chronic kidney disease), stage IV 2022    Diabetes mellitus due to underlying condition with unspecified complications 2022    Gastroparesis 2022    Heart failure with preserved ejection fraction 2022    EF 55% on 3/22    History of gastroesophageal reflux (GERD)     History of supraventricular tachycardia     Hyperkalemia 2022    Hypertensive emergency 2022    Sickle cell trait 2022    Type 2 diabetes mellitus        Past Surgical History:   Procedure Laterality Date     SECTION      x 3    ESOPHAGOGASTRODUODENOSCOPY N/A 10/18/2019    Procedure: ESOPHAGOGASTRODUODENOSCOPY (EGD);  Surgeon: Gianluca Mendez MD;  Location: [facility];  Service: Endoscopy;  Laterality: N/A;    PLACEMENT OF DUAL-LUMEN VASCULAR CATHETER Left 2022    Procedure: INSERTION-CATHETER-JOSEPH;  Surgeon: Dionte Gan MD;  Location: [facility] OR;  Service: General;  Laterality: Left;       Review of patient's allergies indicates:   Allergen Reactions    Penicillins Hives       Current Facility-Administered Medications on File Prior to Encounter   Medication    [COMPLETED] ondansetron injection 4 mg    [DISCONTINUED] 0.9%  NaCl infusion    [DISCONTINUED] clindamycin in D5W 900 mg/50 mL IVPB 900 mg    [DISCONTINUED] electrolyte-S (ISOLYTE)    [DISCONTINUED] famotidine (PF) injection 20 mg    [DISCONTINUED] fentaNYL 50 mcg/mL injection 25 mcg    [DISCONTINUED] lactated ringers infusion    [DISCONTINUED] LIDOcaine (PF) 10 mg/ml (1%) injection 10 mg     Current Outpatient Medications on File Prior to Encounter   Medication Sig    amLODIPine (NORVASC) 10 MG tablet Take 1 tablet (10 mg total) by mouth once daily.    atenoloL (TENORMIN) 50 MG tablet Take 1 tablet (50 mg total) by mouth every other day.    famotidine (PEPCID) 20 MG tablet Take 1 tablet (20 mg total) by mouth once daily.    FLUoxetine 20 MG capsule Take 1 capsule (20 mg total) by mouth once daily.     HYDROcodone-acetaminophen (NORCO) 5-325 mg per tablet Take 1 tablet by mouth every 6 (six) hours as needed for Pain.    insulin aspart U-100 (NOVOLOG) 100 unit/mL (3 mL) InPn pen Inject 1-10 Units into the skin before meals and at bedtime as needed (Hyperglycemia).    isosorbide mononitrate (IMDUR) 60 MG 24 hr tablet Take 2 tablets (120 mg total) by mouth once daily.    LANTUS U-100 INSULIN 100 unit/mL injection SMARTSI Unit(s) SUB-Q Every Morning    levETIRAcetam (KEPPRA) 500 MG Tab Take 1 tablet (500 mg total) by mouth 2 (two) times daily.    polyethylene glycol (GLYCOLAX) 17 gram/dose powder Take 17 g by mouth 2 (two) times daily.    predniSONE (DELTASONE) 20 MG tablet Take 3 tablets (60 mg total) by mouth once daily for 4 days, THEN 2 tablets (40 mg total) once daily for 4 days, THEN 1 tablet (20 mg total) once daily for 4 days, THEN 0.5 tablets (10 mg total) once daily for 4 days.    [DISCONTINUED] furosemide (LASIX) 40 MG tablet Take 1 tablet (40 mg total) by mouth 2 (two) times daily.    [DISCONTINUED] pantoprazole (PROTONIX) 40 MG tablet Take 1 tablet (40 mg total) by mouth once daily.    [DISCONTINUED] torsemide (DEMADEX) 20 MG Tab Take 1 tablet (20 mg total) by mouth 2 (two) times a day. (Patient taking differently: Take 20 mg by mouth once daily.)     Family History       Problem Relation (Age of Onset)    Diabetes Mother, Father          Tobacco Use    Smoking status: Never Smoker    Smokeless tobacco: Never Used   Substance and Sexual Activity    Alcohol use: No    Drug use: No    Sexual activity: Not Currently     Partners: Male     Birth control/protection: I.U.D.     Review of Systems   Constitutional:  Positive for appetite change, diaphoresis and fatigue.   Respiratory:  Positive for cough, shortness of breath and wheezing.    Gastrointestinal:  Positive for abdominal pain, nausea and vomiting.   Neurological:  Positive for weakness.   All other systems reviewed and are negative.  Objective:      Vital Signs (Most Recent):  Temp: 98.8 °F (37.1 °C) (07/22/22 0940)  Pulse: 78 (07/22/22 1405)  Resp: (!) 22 (07/22/22 1405)  BP: 115/66 (07/22/22 1401)  SpO2: (!) 84 % (07/22/22 1405)   Vital Signs (24h Range):  Temp:  [98.7 °F (37.1 °C)-98.8 °F (37.1 °C)] 98.8 °F (37.1 °C)  Pulse:  [78-92] 78  Resp:  [17-80] 22  SpO2:  [84 %-100 %] 84 %  BP: (104-155)/(66-99) 115/66     Weight: 74.8 kg (164 lb 15.9 oz)  Body mass index is 30.18 kg/m².    Physical Exam  Constitutional:       Appearance: She is well-developed.      Comments: In distress, respiratory distress as well   HENT:      Head: Normocephalic and atraumatic.   Eyes:      Conjunctiva/sclera: Conjunctivae normal.      Pupils: Pupils are equal, round, and reactive to light.   Neck:      Thyroid: No thyromegaly.      Vascular: No JVD.   Cardiovascular:      Rate and Rhythm: Normal rate and regular rhythm.      Heart sounds: No murmur heard.    No friction rub. No gallop.   Pulmonary:      Effort: Pulmonary effort is normal.      Breath sounds: Wheezing present.      Comments: In respiratory distress, on non-rebreather 100%  Abdominal:      General: Bowel sounds are normal. There is no distension.      Palpations: Abdomen is soft. There is no mass.      Tenderness: There is no abdominal tenderness.   Musculoskeletal:         General: Normal range of motion.      Cervical back: Neck supple.   Skin:     General: Skin is warm and dry.   Neurological:      Mental Status: She is oriented to person, place, and time.      Cranial Nerves: No cranial nerve deficit.   Psychiatric:         Behavior: Behavior normal.         CRANIAL NERVES     CN III, IV, VI   Pupils are equal, round, and reactive to light.     Significant Labs: All pertinent labs within the past 24 hours have been reviewed.  CBC:   Recent Labs   Lab 07/22/22  1008   WBC 24.50*   HGB 8.2*   HCT 22.8*        CMP:   Recent Labs   Lab 07/22/22  1008 07/22/22  1358   *  --    K 4.1  --       --    CO2 18*  --    * 615*   BUN 50*  --    CREATININE 4.4*  --    CALCIUM 7.8*  --    ANIONGAP 14  --    EGFRNONAA 12*  --      Lactic Acid:   Recent Labs   Lab 07/22/22  1112 07/22/22  1358   LACTATE 0.8 1.1     Lipase: No results for input(s): LIPASE in the last 48 hours.  Magnesium: No results for input(s): MG in the last 48 hours.  POCT Glucose:   Recent Labs   Lab 07/22/22  1313 07/22/22  1317 07/22/22  1427   POCTGLUCOSE >500* >500* >500*     Troponin: No results for input(s): TROPONINI in the last 48 hours.  TSH:   Recent Labs   Lab 07/08/22  1517   TSH 1.866     Urine Studies: No results for input(s): COLORU, APPEARANCEUA, PHUR, SPECGRAV, PROTEINUA, GLUCUA, KETONESU, BILIRUBINUA, OCCULTUA, NITRITE, UROBILINOGEN, LEUKOCYTESUR, RBCUA, WBCUA, BACTERIA, SQUAMEPITHEL, HYALINECASTS in the last 48 hours.    Invalid input(s): WRIGHTSUR    Significant Imaging:   CXR: New right basilar opacification suggesting pneumonia.  Interval removal of the right-sided central venous catheter and the left-sided central venous catheter has partially retracted with the distal tip now at the level of the confluence of the left brachiocephalic vein and SVC

## 2022-07-22 NOTE — EICU
Rounding (Video Assessment):  yes    Comments: 1501  Called into room per eLert.  Pt intubated with sats 34%.   Anesthesia, resp, and nursing staff at bedside.  ET bagging started and pt sats improved to 90 %.  Pt put back on vent at 1511.  She began to desat again to 67%.  ET bagging restarted and pt once again improved to 97% and place back on vent and continued to remain stable with , 171/85, 28, 97%.  Decision made to insert trialysis line, timeout done and line successfully placed.  CXR ordered to confirm placement.   1545   Pt resting on vent, VS 85, 131/87, 18, 100%.

## 2022-07-22 NOTE — ED NOTES
Patient presents to ED from pre op with report of low 02 sat in the 80s on non-rebreather and abd pain. Patient was in pre op to have dialysis port changed. Patient given 25 of fentanyl in pre op. Dialysis venous access port used.

## 2022-07-22 NOTE — HPI
Patient is a 32-year-old  female with past medical history significant for sickle cell anemia, end-stage renal disease (on hemodialysis every Tuesday/Thursday/Saturday), diabetes mellitus type 2, gastroparesis and hypertension who is being admitted to Hospital Medicine under inpatient status from [facility] Emergency Room where patient presented with worsening shortness of Breath started this morning.  Patient was scheduled to undergo removal of malfunctioning Apolinar catheter.  Patient reported compliance with dialysis therapy, patient did receive hemodialysis yesterday.  Patient has been coughing up purulent expectoration.  Denies any associated fevers or chills.  At present, patient is in significant respiratory distress and not able to provide further meaningful history of present illness.  Patient has 100% non-rebreather placed.  Patient denies any chest pain or palpitations.  Patient is reporting frequent nausea and abdominal pain.  Patient's blood sugars noted to be in excess of 600 mg%.

## 2022-07-22 NOTE — H&P
[facility]  General Surgery  History & Physical    Patient Name: Tabby Howard  MRN: [id number]  Admission Date: 7/22/2022  Primary Care Provider: Primary Doctor No      Subjective:     Chief Complaint/Reason for Admission: Hypoxia    History of Present Illness:  Patient is a 32 y.o. female presents with a malfunctioning hemodialysis line yesterday to clinic.  She was placed on the schedule for exchanged today.  She was found to be hypoxic on presentation to preop.  She was sent down to the ER and found to be severely hypoxic and in DKA with a right lower lobe pneumonia.  Her clinical status has been deteriorating.  I recommended proceeding to the OR for intubation to secure her airway and placement of a new dialysis line to avoid the need of potential emergent placement    Current Facility-Administered Medications on File Prior to Encounter   Medication    [COMPLETED] ondansetron injection 4 mg    [DISCONTINUED] 0.9%  NaCl infusion    [DISCONTINUED] clindamycin in D5W 900 mg/50 mL IVPB 900 mg    [DISCONTINUED] electrolyte-S (ISOLYTE)    [DISCONTINUED] famotidine (PF) injection 20 mg    [DISCONTINUED] fentaNYL 50 mcg/mL injection 25 mcg    [DISCONTINUED] lactated ringers infusion    [DISCONTINUED] LIDOcaine (PF) 10 mg/ml (1%) injection 10 mg     Current Outpatient Medications on File Prior to Encounter   Medication Sig    amLODIPine (NORVASC) 10 MG tablet Take 1 tablet (10 mg total) by mouth once daily.    atenoloL (TENORMIN) 50 MG tablet Take 1 tablet (50 mg total) by mouth every other day.    famotidine (PEPCID) 20 MG tablet Take 1 tablet (20 mg total) by mouth once daily.    FLUoxetine 20 MG capsule Take 1 capsule (20 mg total) by mouth once daily.    HYDROcodone-acetaminophen (NORCO) 5-325 mg per tablet Take 1 tablet by mouth every 6 (six) hours as needed for Pain.    insulin aspart U-100 (NOVOLOG) 100 unit/mL (3 mL) InPn pen Inject 1-10 Units into the skin before meals and at  bedtime as needed (Hyperglycemia).    isosorbide mononitrate (IMDUR) 60 MG 24 hr tablet Take 2 tablets (120 mg total) by mouth once daily.    LANTUS U-100 INSULIN 100 unit/mL injection SMARTSI Unit(s) SUB-Q Every Morning    levETIRAcetam (KEPPRA) 500 MG Tab Take 1 tablet (500 mg total) by mouth 2 (two) times daily.    polyethylene glycol (GLYCOLAX) 17 gram/dose powder Take 17 g by mouth 2 (two) times daily.    predniSONE (DELTASONE) 20 MG tablet Take 3 tablets (60 mg total) by mouth once daily for 4 days, THEN 2 tablets (40 mg total) once daily for 4 days, THEN 1 tablet (20 mg total) once daily for 4 days, THEN 0.5 tablets (10 mg total) once daily for 4 days.    [DISCONTINUED] furosemide (LASIX) 40 MG tablet Take 1 tablet (40 mg total) by mouth 2 (two) times daily.    [DISCONTINUED] pantoprazole (PROTONIX) 40 MG tablet Take 1 tablet (40 mg total) by mouth once daily.    [DISCONTINUED] torsemide (DEMADEX) 20 MG Tab Take 1 tablet (20 mg total) by mouth 2 (two) times a day. (Patient taking differently: Take 20 mg by mouth once daily.)       Review of patient's allergies indicates:   Allergen Reactions    Penicillins Hives       Past Medical History:   Diagnosis Date    CKD (chronic kidney disease), stage IV 2022    Diabetes mellitus due to underlying condition with unspecified complications 2022    Gastroparesis 2022    Heart failure with preserved ejection fraction 2022    EF 55% on 3/22    History of gastroesophageal reflux (GERD)     History of supraventricular tachycardia     Hyperkalemia 2022    Hypertensive emergency 2022    Sickle cell trait 2022    Type 2 diabetes mellitus      Past Surgical History:   Procedure Laterality Date     SECTION      x 3    ESOPHAGOGASTRODUODENOSCOPY N/A 10/18/2019    Procedure: ESOPHAGOGASTRODUODENOSCOPY (EGD);  Surgeon: Gianluca Mendez MD;  [facility];  Service: Endoscopy;  Laterality: N/A;    PLACEMENT OF  DUAL-LUMEN VASCULAR CATHETER Left 7/12/2022    Procedure: INSERTION-CATHETER-JOSEPH;  Surgeon: Dionte Gan MD;  [facility];  Service: General;  Laterality: Left;     Family History     Problem Relation (Age of Onset)    Diabetes Mother, Father        Tobacco Use    Smoking status: Never Smoker    Smokeless tobacco: Never Used   Substance and Sexual Activity    Alcohol use: No    Drug use: No    Sexual activity: Not Currently     Partners: Male     Birth control/protection: I.U.D.     Review of Systems   Unable to obtain meaningful review of symptoms given breathing and respiratory status  Objective:     Vital Signs (Most Recent):  Temp: 98.8 °F (37.1 °C) (07/22/22 0940)  Pulse: 78 (07/22/22 1405)  Resp: (!) 22 (07/22/22 1405)  BP: 115/66 (07/22/22 1401)  SpO2: (!) 84 % (07/22/22 1405) Vital Signs (24h Range):  Temp:  [98.7 °F (37.1 °C)-98.8 °F (37.1 °C)] 98.8 °F (37.1 °C)  Pulse:  [78-92] 78  Resp:  [17-80] 22  SpO2:  [84 %-100 %] 84 %  BP: (104-155)/(66-99) 115/66     Weight: 74.8 kg (164 lb 15.9 oz)  Body mass index is 30.18 kg/m².    Physical Exam  Constitutional:       General: She is in acute distress.      Appearance: Normal appearance. She is obese. She is ill-appearing. She is not toxic-appearing or diaphoretic.   HENT:      Head: Normocephalic.      Nose: Nose normal.   Eyes:      Conjunctiva/sclera: Conjunctivae normal.   Cardiovascular:      Rate and Rhythm: Normal rate and regular rhythm.   Pulmonary:      Effort: Respiratory distress present.   Abdominal:      Palpations: Abdomen is soft.   Musculoskeletal:         General: Normal range of motion.      Cervical back: Normal range of motion.   Skin:     General: Skin is warm.   Neurological:      General: No focal deficit present.      Mental Status: She is alert.   Psychiatric:         Mood and Affect: Mood normal.         Significant Labs:  I have reviewed all pertinent lab results within the past 24 hours.  CBC:   Recent Labs   Lab  07/22/22  1008   WBC 24.50*   RBC 2.77*   HGB 8.2*   HCT 22.8*      MCV 82   MCH 29.6   MCHC 36.0     BMP:   Recent Labs   Lab 07/22/22  1008   *   *   K 4.1      CO2 18*   BUN 50*   CREATININE 4.4*   CALCIUM 7.8*     CMP:   Recent Labs   Lab 07/17/22  0423 07/22/22  1008   * 601*   CALCIUM 7.8* 7.8*   ALBUMIN 1.8*  --    PROT 4.9*  --    * 134*   K 4.5 4.1   CO2 22* 18*    102   BUN 39* 50*   CREATININE 3.2* 4.4*   ALKPHOS 82  --    ALT 16  --    AST 14  --    BILITOT 0.7  --      LFTs:   Recent Labs   Lab 07/17/22  0423   ALT 16   AST 14   ALKPHOS 82   BILITOT 0.7   PROT 4.9*   ALBUMIN 1.8*     Coagulation: No results for input(s): LABPROT, INR, APTT in the last 168 hours.    Significant Diagnostics:  Chest x-ray has been reviewed.  Right lower lobe infiltrate    Assessment/Plan:     32-year-old female who initially presented for dialysis line exchange given malfunction.  She was hypoxic and found to be in DKA with a likely right lower lobe pneumonia.  She has had worsening respiratory status over the past couple of hours.    I recommended proceeding to the OR for intubation to secure her airway.  While over there.  Anticipate placing a new hemodialysis line to avoid the potential need for emergent line placement in the very near future.    Dionte Gan MD  General Surgery  [facility]

## 2022-07-22 NOTE — EICU
Rounding (Video Assessment):  yes  Comments:  Pts blood pressure dropped 55/35 shortly after hemodialysis started.  Dialysis stopped and Levophed adjusted.  Blood pressure recovered and dialysis restarted.

## 2022-07-22 NOTE — ASSESSMENT & PLAN NOTE
Continue supplemental oxygen to maintain pulse ox above 92%.  Use BiPAP therapy as needed.  Supplemental O2 via nasal canula; titrate O2 saturation to >92%.   Pulmonary consultation.   Continue beta 2 agonist bronchodilator treatments.   Continue IV antibiotics Levaquin and vancomycin.  Pharmacist to dose Vancomycin.  Patient is penicillin allergic.  Check sputum GS and Cx.   Continue routine medications as before.

## 2022-07-22 NOTE — CHAPLAIN
Was in ICU when pt brought up from ED; provided compassionate presence and encouragement; reminded her she is loved, being taken care of and that Carlos has got her; know pt from previous admits, she relies heavily on her Sabianism bruce; held her hand when I was able, then stepped out of the way for the care team; prayed silently in the corner of the room for 30 minutes as the care team tended to pt;  will continue to follow. Lord, in your mercy.

## 2022-07-22 NOTE — CONSULTS
2022      Admit Date: 2022  Tabby Howard  New Patient Consult    Chief Complaint   Patient presents with    Abdominal Pain     Patient presented to pre-op for dialysis cath changed and was in extreme abdominal pain and was brought to the er        History of Present Illness:   Not clear what transpired to ppt current picture.  Pt seen by Dr Gan yesterday with h/o tunneled dialysis line recently placed in left neck having profuse bleeding and needing replacement.    Pt intubated and sedated.  Pt only 5 ft 2 inches with 8 cc/kg 400 cc tidal vol.  Would need 300 cc for 6 cc/kg pbw.  Pt had severe resp distress failing high ox and needing intubation for dialysis line manipulation.  Pt evaluated during and after line placed.  Pt was schedule to have dialysis line changed presenting dka/rll pneumonia.  Chart reviewed and pt examined.    H/o sz, ss trait, diabetes, hf pef, diabete with gastroparesis.    PFSH:  Past Medical History:   Diagnosis Date    CKD (chronic kidney disease), stage IV 2022    Diabetes mellitus due to underlying condition with unspecified complications 2022    Gastroparesis 2022    Heart failure with preserved ejection fraction 2022    EF 55% on 3/22    History of gastroesophageal reflux (GERD)     History of supraventricular tachycardia     Hyperkalemia 2022    Hypertensive emergency 2022    Sickle cell trait 2022    Type 2 diabetes mellitus      Past Surgical History:   Procedure Laterality Date     SECTION      x 3    ESOPHAGOGASTRODUODENOSCOPY N/A 10/18/2019    Procedure: ESOPHAGOGASTRODUODENOSCOPY (EGD);  Surgeon: Gianluca Mendez MD;  Location: [facility];  Service: Endoscopy;  Laterality: N/A;    PLACEMENT OF DUAL-LUMEN VASCULAR CATHETER Left 2022    Procedure: INSERTION-CATHETER-JOSEPH;  Surgeon: Dionte Gan MD;  Location: [facility];  Service: General;  Laterality: Left;     Social History     Tobacco Use    Smoking  "status: Never Smoker    Smokeless tobacco: Never Used   Substance Use Topics    Alcohol use: No    Drug use: No     Family History   Problem Relation Age of Onset    Diabetes Mother     Diabetes Father      Review of patient's allergies indicates:   Allergen Reactions    Penicillins Hives            Review of Systems:  due to neurologic status/impairments a Review of Systems could not be obtained       Exam:Comprehensive exam done. /85   Pulse 84   Temp (!) 95.36 °F (35.2 °C)   Resp (!) 24   Ht 5' 2" (1.575 m)   Wt 74.8 kg (164 lb 15.9 oz)   SpO2 (!) 89%   Breastfeeding No   BMI 30.18 kg/m²   Exam included Vitals as listed, and patient's appearance and affect and alertness and mood, oral exam for yeast and hygiene and pharynx lesions and Mallapatti (M) score, neck with inspection for jvd and masses and thyroid abnormalities and lymph nodes (supraclavicular and infraclavicular nodes also examined and noted if abn), chest exam included symmetry and effort and fremitus and percussion and auscultation, cardiac exam included rhythm and gallops and murmur and rubs and jvd and edema, abdominal exam for mass and hepatosplenomegaly and tenderness and hernias and bowel sounds, Musculoskeletal exam with muscle tone and posture and mobility/gait and  strenght, and skin for rashes and cyanosis and pallor and turgor, extremity for clubbing.  Findings were normal except as listed below:  Intubated and sedated.  Pink fluid in et tube.  Min resp distress.  chest is symmetric, no distress, normal percussion, normal fremitus and good   breath sounds, no clubing, soft abd.  Not arousing.        Radiographs reviewed: view by direct vision    Results for orders placed X-Ray Chest 1 Viewduring the hospital encounter of 07/07/22        Narrative  EXAMINATION:  XR CHEST 1 VIEW    CLINICAL HISTORY:  left IJ line;    TECHNIQUE:  Single frontal view of the chest was " performed.    COMPARISON:  07/09/2022    FINDINGS:  New left IJ dialysis catheter placement with tip projecting over the SVC, in satisfactory position.  Right IJ central venous catheter unchanged.  No pneumothorax.  Improved aeration of the lungs, currently clear.  Marked enlargement of cardiac silhouette, unchanged.    Impression  Satisfactory central venous catheter placement with no pneumothorax.      Electronically signed by: Leigh Ann Harris  Date:    07/12/2022  Time:    15:53  ]    Labs     Recent Labs   Lab 07/22/22  1008   WBC 24.50*   HGB 8.2*   HCT 22.8*        Recent Labs   Lab 07/22/22  1008 07/22/22  1112 07/22/22  1358 07/22/22  1436   *  --   --   --    K 4.1  --   --   --      --   --   --    CO2 18*  --   --   --    BUN 50*  --   --   --    CREATININE 4.4*  --   --   --    *  --  615* 617*   CALCIUM 7.8*  --   --   --    LACTATE  --    < > 1.1  --     < > = values in this interval not displayed.     Recent Labs   Lab 07/22/22  1408   PH 7.245*   PCO2 52.6*   PO2 38*   HCO3 22.8*     Microbiology Results (last 7 days)     Procedure Component Value Units Date/Time    Culture, Respiratory with Gram Stain [[id number]] Collected: 07/22/22 1550    Order Status: Sent Specimen: Sputum, Expectorated Updated: 07/22/22 1609    Blood culture x two cultures. Draw prior to antibiotics. [[id number]] Collected: 07/22/22 1132    Order Status: Sent Specimen: Blood from Antecubital, Right Arm Updated: 07/22/22 1132    Blood culture x two cultures. Draw prior to antibiotics. [[id number]] Collected: 07/22/22 1112    Order Status: Sent Specimen: Blood from Antecubital, Right Arm Updated: 07/22/22 1113          Impression:  Active Hospital Problems    Diagnosis  POA    *Acute hypoxemic respiratory failure [J96.01]  Yes    HCAP (healthcare-associated pneumonia), RLL [J18.9]  Yes    ESRD (end stage renal disease) [N18.6]  Yes    Hyperglycemia without ketosis [R73.9]  Yes    Anemia [D64.9]   Yes    Seizure [R56.9]  Yes    Gastroparesis [K31.84]  Yes    Type II diabetes mellitus [E11.9]  Yes      Resolved Hospital Problems   No resolved problems to display.               Plan: cxr diffuse haze with density rll medially >> left- c/w pneumonia and diffuse lung injury.    Pt desaturated post procedure with peep 12- peep 30 x 30 seconds ppt sat rising from 83 on 100% to 100%.  Pt will be rxed for severe ards.     Sputum culture submitted.  Levaquin, cefepime, vanc dosed.      Versed/propofol/dilaudid ordered.     Levophed ordered.  Insulin drip ordered.       The following were evaluated and adjusted as needed: mechanical ventilator settings and weaning status, vasopressors, intravenous fluids and nutritional status, sedation and neurologic status, antibiotics, hemodynamics, support tubes and access lines and invasive monitoring, acid base balance and oxygenation needs and input and output and renal status       Critical Care  - THE PATIENT HAS A HIGH PROBABILITY OF IMMINENT OR LIFE THREATENING DETERIORATION.  Over 50%time of encounter was in direct care at bedside.  Time was 30 to 74 minutes required for patient care.  Time needed for all of the above totaled 43 minutes.

## 2022-07-22 NOTE — CONSULTS
Pharmacokinetic Initial Assessment: IV Vancomycin    Assessment/Plan:    Initiate intravenous vancomycin with loading dose of 1500 mg once with subsequent doses when random concentrations are less than 20 mcg/mL  Desired empiric serum trough concentration is 15 to 20 mcg/mL  Draw vancomycin random level on 7/23 with AM labs.  Pharmacy will continue to follow and monitor vancomycin.      Please contact pharmacy at extension [phone] with any questions regarding this assessment.     Thank you for the consult,   Carlita Dexter       Patient brief summary:  Tabby Howard is a 32 y.o. female initiated on antimicrobial therapy with IV Vancomycin for treatment of suspected  pneumonia    Drug Allergies:   Review of patient's allergies indicates:   Allergen Reactions    Penicillins Hives       Actual Body Weight:   74.8 kg    Renal Function:   Estimated Creatinine Clearance: 17.2 mL/min (A) (based on SCr of 4.4 mg/dL (H)).,     Dialysis Method (if applicable):  Once on Friday 7/22/22    CBC (last 72 hours):  Recent Labs   Lab Result Units 07/22/22  1008   WBC K/uL 24.50*   Hemoglobin g/dL 8.2*   Hematocrit % 22.8*   Platelets K/uL 187   Gran % % 92.2*   Lymph % % 2.6*   Mono % % 3.8*   Eosinophil % % 0.1   Basophil % % 0.1   Differential Method  Automated       Metabolic Panel (last 72 hours):  Recent Labs   Lab Result Units 07/22/22  1008 07/22/22  1358 07/22/22  1436   Sodium mmol/L 134*  --   --    Potassium mmol/L 4.1  --   --    Chloride mmol/L 102  --   --    CO2 mmol/L 18*  --   --    Glucose mg/dL 601* 615* 617*   BUN mg/dL 50*  --   --    Creatinine mg/dL 4.4*  --   --        Drug levels (last 3 results):  No results for input(s): VANCOMYCINRA, VANCORANDOM, VANCOMYCINPE, VANCOPEAK, VANCOMYCINTR, VANCOTROUGH in the last 72 hours.    Microbiologic Results:  Microbiology Results (last 7 days)       Procedure Component Value Units Date/Time    Culture, Respiratory with Gram Stain [[id number]]     Order Status: No result  Specimen: Sputum, Expectorated     Blood culture x two cultures. Draw prior to antibiotics. [[id number]] Collected: 07/22/22 1132    Order Status: Sent Specimen: Blood from Antecubital, Right Arm Updated: 07/22/22 1132    Blood culture x two cultures. Draw prior to antibiotics. [[id number]] Collected: 07/22/22 1112    Order Status: Sent Specimen: Blood from Antecubital, Right Arm Updated: 07/22/22 1113

## 2022-07-23 ENCOUNTER — OUTSIDE PLACE OF SERVICE (OUTPATIENT)
Dept: PULMONOLOGY | Facility: CLINIC | Age: 32
End: 2022-07-23
Payer: MEDICAID

## 2022-07-23 DIAGNOSIS — J96.01 ACUTE HYPOXEMIC RESPIRATORY FAILURE: ICD-10-CM

## 2022-07-23 LAB
ABO + RH BLD: NORMAL
ALBUMIN SERPL BCP-MCNC: 1.7 G/DL (ref 3.5–5.2)
ALLENS TEST: ABNORMAL
ALP SERPL-CCNC: 72 U/L (ref 55–135)
ALT SERPL W/O P-5'-P-CCNC: 18 U/L (ref 10–44)
ANION GAP SERPL CALC-SCNC: 12 MMOL/L (ref 8–16)
ANION GAP SERPL CALC-SCNC: 12 MMOL/L (ref 8–16)
AST SERPL-CCNC: 15 U/L (ref 10–40)
BILIRUB SERPL-MCNC: 0.9 MG/DL (ref 0.1–1)
BLD GP AB SCN CELLS X3 SERPL QL: NORMAL
BLD PROD TYP BPU: NORMAL
BLOOD UNIT EXPIRATION DATE: NORMAL
BLOOD UNIT TYPE CODE: 6200
BLOOD UNIT TYPE: NORMAL
BUN SERPL-MCNC: 36 MG/DL (ref 6–20)
BUN SERPL-MCNC: 37 MG/DL (ref 6–20)
CALCIUM SERPL-MCNC: 7.7 MG/DL (ref 8.7–10.5)
CALCIUM SERPL-MCNC: 7.9 MG/DL (ref 8.7–10.5)
CHLORIDE SERPL-SCNC: 106 MMOL/L (ref 95–110)
CHLORIDE SERPL-SCNC: 108 MMOL/L (ref 95–110)
CO2 SERPL-SCNC: 19 MMOL/L (ref 23–29)
CO2 SERPL-SCNC: 21 MMOL/L (ref 23–29)
CODING SYSTEM: NORMAL
CREAT SERPL-MCNC: 3.2 MG/DL (ref 0.5–1.4)
CREAT SERPL-MCNC: 3.4 MG/DL (ref 0.5–1.4)
DELSYS: ABNORMAL
DISPENSE STATUS: NORMAL
ERYTHROCYTE [DISTWIDTH] IN BLOOD BY AUTOMATED COUNT: 15.4 % (ref 11.5–14.5)
ERYTHROCYTE [SEDIMENTATION RATE] IN BLOOD BY WESTERGREN METHOD: 14 MM/H
ERYTHROCYTE [SEDIMENTATION RATE] IN BLOOD BY WESTERGREN METHOD: 14 MM/H
ERYTHROCYTE [SEDIMENTATION RATE] IN BLOOD BY WESTERGREN METHOD: 30 MM/H
EST. GFR  (AFRICAN AMERICAN): 19 ML/MIN/1.73 M^2
EST. GFR  (AFRICAN AMERICAN): 21 ML/MIN/1.73 M^2
EST. GFR  (NON AFRICAN AMERICAN): 17 ML/MIN/1.73 M^2
EST. GFR  (NON AFRICAN AMERICAN): 18 ML/MIN/1.73 M^2
ETCO2: 17
ETCO2: 20
ETCO2: 31
FIO2: 100
FIO2: 40
FIO2: 50
GLUCOSE SERPL-MCNC: 159 MG/DL (ref 70–110)
GLUCOSE SERPL-MCNC: 166 MG/DL (ref 70–110)
HCO3 UR-SCNC: 21.7 MMOL/L (ref 24–28)
HCO3 UR-SCNC: 23.4 MMOL/L (ref 24–28)
HCO3 UR-SCNC: 24.5 MMOL/L (ref 24–28)
HCT VFR BLD AUTO: 19.2 % (ref 37–48.5)
HGB BLD-MCNC: 6.2 G/DL (ref 12–16)
HGB BLD-MCNC: 7.1 G/DL (ref 12–16)
LACTATE SERPL-SCNC: 1.1 MMOL/L (ref 0.5–2.2)
MAGNESIUM SERPL-MCNC: 1.7 MG/DL (ref 1.6–2.6)
MCH RBC QN AUTO: 29.5 PG (ref 27–31)
MCHC RBC AUTO-ENTMCNC: 37 G/DL (ref 32–36)
MCV RBC AUTO: 80 FL (ref 82–98)
MIN VOL: 11.3
MIN VOL: 14.2
MIN VOL: 6.32
MODE: ABNORMAL
NUM UNITS TRANS PACKED RBC: NORMAL
PCO2 BLDA: 25.2 MMHG (ref 35–45)
PCO2 BLDA: 26.2 MMHG (ref 35–45)
PCO2 BLDA: 37.2 MMHG (ref 35–45)
PEEP: 10
PEEP: 18
PEEP: 5
PH SMN: 7.41 [PH] (ref 7.35–7.45)
PH SMN: 7.53 [PH] (ref 7.35–7.45)
PH SMN: 7.6 [PH] (ref 7.35–7.45)
PHOSPHATE SERPL-MCNC: 4.5 MG/DL (ref 2.7–4.5)
PIP: 25
PIP: 26
PIP: 35
PLATELET # BLD AUTO: 172 K/UL (ref 150–450)
PMV BLD AUTO: 10.1 FL (ref 9.2–12.9)
PO2 BLDA: 467 MMHG (ref 80–100)
PO2 BLDA: 55 MMHG (ref 80–100)
PO2 BLDA: 80 MMHG (ref 80–100)
POC BE: -1 MMOL/L
POC BE: -1 MMOL/L
POC BE: 3 MMOL/L
POC SATURATED O2: 100 % (ref 95–100)
POC SATURATED O2: 92 % (ref 95–100)
POC SATURATED O2: 96 % (ref 95–100)
POC TCO2: 22 MMOL/L (ref 23–27)
POC TCO2: 25 MMOL/L (ref 23–27)
POC TCO2: 25 MMOL/L (ref 23–27)
POCT GLUCOSE: 146 MG/DL (ref 70–110)
POCT GLUCOSE: 155 MG/DL (ref 70–110)
POCT GLUCOSE: 157 MG/DL (ref 70–110)
POCT GLUCOSE: 162 MG/DL (ref 70–110)
POCT GLUCOSE: 175 MG/DL (ref 70–110)
POCT GLUCOSE: 175 MG/DL (ref 70–110)
POCT GLUCOSE: 181 MG/DL (ref 70–110)
POCT GLUCOSE: 182 MG/DL (ref 70–110)
POCT GLUCOSE: 183 MG/DL (ref 70–110)
POCT GLUCOSE: 186 MG/DL (ref 70–110)
POCT GLUCOSE: 187 MG/DL (ref 70–110)
POCT GLUCOSE: 199 MG/DL (ref 70–110)
POCT GLUCOSE: 216 MG/DL (ref 70–110)
POCT GLUCOSE: 236 MG/DL (ref 70–110)
POTASSIUM SERPL-SCNC: 3.7 MMOL/L (ref 3.5–5.1)
POTASSIUM SERPL-SCNC: 3.9 MMOL/L (ref 3.5–5.1)
PROT SERPL-MCNC: 4.4 G/DL (ref 6–8.4)
RBC # BLD AUTO: 2.41 M/UL (ref 4–5.4)
SAMPLE: ABNORMAL
SITE: ABNORMAL
SODIUM SERPL-SCNC: 139 MMOL/L (ref 136–145)
SODIUM SERPL-SCNC: 139 MMOL/L (ref 136–145)
SP02: 100
SP02: 91
SP02: 99
VANCOMYCIN SERPL-MCNC: 15.1 UG/ML
VT: 450
WBC # BLD AUTO: 27.07 K/UL (ref 3.9–12.7)

## 2022-07-23 PROCEDURE — 86850 RBC ANTIBODY SCREEN: CPT | Performed by: INTERNAL MEDICINE

## 2022-07-23 PROCEDURE — 63600175 PHARM REV CODE 636 W HCPCS: Performed by: INTERNAL MEDICINE

## 2022-07-23 PROCEDURE — 85027 COMPLETE CBC AUTOMATED: CPT | Performed by: INTERNAL MEDICINE

## 2022-07-23 PROCEDURE — 37799 UNLISTED PX VASCULAR SURGERY: CPT

## 2022-07-23 PROCEDURE — 80202 ASSAY OF VANCOMYCIN: CPT | Performed by: INTERNAL MEDICINE

## 2022-07-23 PROCEDURE — 27000221 HC OXYGEN, UP TO 24 HOURS

## 2022-07-23 PROCEDURE — 51798 US URINE CAPACITY MEASURE: CPT

## 2022-07-23 PROCEDURE — 36430 TRANSFUSION BLD/BLD COMPNT: CPT

## 2022-07-23 PROCEDURE — 85018 HEMOGLOBIN: CPT | Performed by: INTERNAL MEDICINE

## 2022-07-23 PROCEDURE — 94761 N-INVAS EAR/PLS OXIMETRY MLT: CPT

## 2022-07-23 PROCEDURE — 25000003 PHARM REV CODE 250: Performed by: NURSE PRACTITIONER

## 2022-07-23 PROCEDURE — 82803 BLOOD GASES ANY COMBINATION: CPT

## 2022-07-23 PROCEDURE — A4216 STERILE WATER/SALINE, 10 ML: HCPCS | Performed by: INTERNAL MEDICINE

## 2022-07-23 PROCEDURE — 80100014 HC HEMODIALYSIS 1:1

## 2022-07-23 PROCEDURE — 80053 COMPREHEN METABOLIC PANEL: CPT | Performed by: INTERNAL MEDICINE

## 2022-07-23 PROCEDURE — 84100 ASSAY OF PHOSPHORUS: CPT | Performed by: INTERNAL MEDICINE

## 2022-07-23 PROCEDURE — 99900026 HC AIRWAY MAINTENANCE (STAT)

## 2022-07-23 PROCEDURE — 20000000 HC ICU ROOM

## 2022-07-23 PROCEDURE — 80048 BASIC METABOLIC PNL TOTAL CA: CPT | Mod: XB | Performed by: NURSE PRACTITIONER

## 2022-07-23 PROCEDURE — 94003 VENT MGMT INPAT SUBQ DAY: CPT

## 2022-07-23 PROCEDURE — 83605 ASSAY OF LACTIC ACID: CPT | Performed by: INTERNAL MEDICINE

## 2022-07-23 PROCEDURE — 99291 CRITICAL CARE FIRST HOUR: CPT | Mod: S$GLB,,, | Performed by: INTERNAL MEDICINE

## 2022-07-23 PROCEDURE — 99900035 HC TECH TIME PER 15 MIN (STAT)

## 2022-07-23 PROCEDURE — 86920 COMPATIBILITY TEST SPIN: CPT | Performed by: INTERNAL MEDICINE

## 2022-07-23 PROCEDURE — P9016 RBC LEUKOCYTES REDUCED: HCPCS | Performed by: INTERNAL MEDICINE

## 2022-07-23 PROCEDURE — 25000003 PHARM REV CODE 250: Performed by: INTERNAL MEDICINE

## 2022-07-23 PROCEDURE — 83735 ASSAY OF MAGNESIUM: CPT | Performed by: INTERNAL MEDICINE

## 2022-07-23 PROCEDURE — 99291 PR CRITICAL CARE, E/M 30-74 MINUTES: ICD-10-PCS | Mod: S$GLB,,, | Performed by: INTERNAL MEDICINE

## 2022-07-23 RX ORDER — INSULIN ASPART 100 [IU]/ML
0-5 INJECTION, SOLUTION INTRAVENOUS; SUBCUTANEOUS EVERY 4 HOURS PRN
Status: DISCONTINUED | OUTPATIENT
Start: 2022-07-23 | End: 2022-07-27 | Stop reason: HOSPADM

## 2022-07-23 RX ORDER — HYDROCODONE BITARTRATE AND ACETAMINOPHEN 500; 5 MG/1; MG/1
TABLET ORAL
Status: DISCONTINUED | OUTPATIENT
Start: 2022-07-23 | End: 2022-07-27 | Stop reason: HOSPADM

## 2022-07-23 RX ORDER — FENTANYL CITRAT/DEXTROSE 5%/PF 100 MCG/10
0-250 PATIENT CONTROLLED ANALGESIA SYRINGE INTRAVENOUS CONTINUOUS
Status: DISCONTINUED | OUTPATIENT
Start: 2022-07-23 | End: 2022-07-24

## 2022-07-23 RX ORDER — GLUCAGON 1 MG
1 KIT INJECTION
Status: DISCONTINUED | OUTPATIENT
Start: 2022-07-23 | End: 2022-07-27 | Stop reason: HOSPADM

## 2022-07-23 RX ORDER — SODIUM CHLORIDE 9 MG/ML
INJECTION, SOLUTION INTRAVENOUS CONTINUOUS
Status: DISCONTINUED | OUTPATIENT
Start: 2022-07-24 | End: 2022-07-25

## 2022-07-23 RX ORDER — SODIUM CHLORIDE 9 MG/ML
INJECTION, SOLUTION INTRAVENOUS ONCE
Status: DISCONTINUED | OUTPATIENT
Start: 2022-07-23 | End: 2022-07-23

## 2022-07-23 RX ORDER — DEXTROSE MONOHYDRATE AND SODIUM CHLORIDE 5; .9 G/100ML; G/100ML
INJECTION, SOLUTION INTRAVENOUS ONCE
Status: DISCONTINUED | OUTPATIENT
Start: 2022-07-23 | End: 2022-07-23

## 2022-07-23 RX ORDER — SODIUM CHLORIDE 9 MG/ML
INJECTION, SOLUTION INTRAVENOUS ONCE
Status: CANCELLED | OUTPATIENT
Start: 2022-07-23 | End: 2022-07-23

## 2022-07-23 RX ADMIN — PROPOFOL 50 MCG/KG/MIN: 10 INJECTION, EMULSION INTRAVENOUS at 12:07

## 2022-07-23 RX ADMIN — ONDANSETRON 4 MG: 2 INJECTION INTRAMUSCULAR; INTRAVENOUS at 10:07

## 2022-07-23 RX ADMIN — HEPARIN SODIUM 5000 UNITS: 5000 INJECTION INTRAVENOUS; SUBCUTANEOUS at 05:07

## 2022-07-23 RX ADMIN — HEPARIN SODIUM 2800 UNITS: 1000 INJECTION INTRAVENOUS; SUBCUTANEOUS at 06:07

## 2022-07-23 RX ADMIN — LEVETIRACETAM 500 MG: 500 TABLET, FILM COATED ORAL at 08:07

## 2022-07-23 RX ADMIN — PROPOFOL 35 MCG/KG/MIN: 10 INJECTION, EMULSION INTRAVENOUS at 08:07

## 2022-07-23 RX ADMIN — POLYETHYLENE GLYCOL 3350 17 G: 17 POWDER, FOR SOLUTION ORAL at 08:07

## 2022-07-23 RX ADMIN — MIDAZOLAM 4 MG: 1 INJECTION INTRAMUSCULAR; INTRAVENOUS at 02:07

## 2022-07-23 RX ADMIN — MIDAZOLAM 4 MG: 1 INJECTION INTRAMUSCULAR; INTRAVENOUS at 12:07

## 2022-07-23 RX ADMIN — Medication 10 ML: at 02:07

## 2022-07-23 RX ADMIN — VANCOMYCIN HYDROCHLORIDE 500 MG: 500 INJECTION, POWDER, LYOPHILIZED, FOR SOLUTION INTRAVENOUS at 07:07

## 2022-07-23 RX ADMIN — FLUOXETINE 20 MG: 20 CAPSULE ORAL at 08:07

## 2022-07-23 RX ADMIN — PROPOFOL 50 MCG/KG/MIN: 10 INJECTION, EMULSION INTRAVENOUS at 11:07

## 2022-07-23 RX ADMIN — PROPOFOL 50 MCG/KG/MIN: 10 INJECTION, EMULSION INTRAVENOUS at 08:07

## 2022-07-23 RX ADMIN — FENTANYL CITRATE 12.5 MCG/HR: 50 INJECTION, SOLUTION INTRAMUSCULAR; INTRAVENOUS at 05:07

## 2022-07-23 RX ADMIN — PROPOFOL 50 MCG/KG/MIN: 10 INJECTION, EMULSION INTRAVENOUS at 02:07

## 2022-07-23 RX ADMIN — AMLODIPINE BESYLATE 10 MG: 5 TABLET ORAL at 09:07

## 2022-07-23 RX ADMIN — FUROSEMIDE 40 MG: 40 TABLET ORAL at 08:07

## 2022-07-23 RX ADMIN — MUPIROCIN: 20 OINTMENT TOPICAL at 09:07

## 2022-07-23 RX ADMIN — PROPOFOL 50 MCG/KG/MIN: 10 INJECTION, EMULSION INTRAVENOUS at 04:07

## 2022-07-23 RX ADMIN — Medication 10 ML: at 08:07

## 2022-07-23 RX ADMIN — FOLIC ACID 1 MG: 1 TABLET ORAL at 08:07

## 2022-07-23 RX ADMIN — MUPIROCIN: 20 OINTMENT TOPICAL at 08:07

## 2022-07-23 RX ADMIN — FAMOTIDINE 20 MG: 20 TABLET, FILM COATED ORAL at 08:07

## 2022-07-23 RX ADMIN — MIDAZOLAM 4 MG: 1 INJECTION INTRAMUSCULAR; INTRAVENOUS at 07:07

## 2022-07-23 RX ADMIN — HEPARIN SODIUM 5000 UNITS: 5000 INJECTION INTRAVENOUS; SUBCUTANEOUS at 09:07

## 2022-07-23 RX ADMIN — PANTOPRAZOLE SODIUM 40 MG: 40 TABLET, DELAYED RELEASE ORAL at 08:07

## 2022-07-23 RX ADMIN — HEPARIN SODIUM 5000 UNITS: 5000 INJECTION INTRAVENOUS; SUBCUTANEOUS at 02:07

## 2022-07-23 RX ADMIN — Medication 10 ML: at 05:07

## 2022-07-23 RX ADMIN — ATENOLOL 50 MG: 50 TABLET ORAL at 08:07

## 2022-07-23 RX ADMIN — ISOSORBIDE MONONITRATE 120 MG: 60 TABLET, EXTENDED RELEASE ORAL at 08:07

## 2022-07-23 NOTE — NURSING
1825 O2 sats dropping again to the 70's. Fio2 100%.  RT at bedside.  Pt bagged. Large amt pink secretions.  Dr. Miller updated on pt status.  Repeat ABG ordered.   1832  ABG done. Po2 64.  Dr Millre notified per RT. No vent changes at present.

## 2022-07-23 NOTE — PROGRESS NOTES
[facility] Hospital Medicine  Progress Note    Patient Name: Tabby Howard  MRN: [id number]  Patient Class: IP- Inpatient   Admission Date: 7/22/2022  Length of Stay: 1 days  Attending Physician: Tosin Roberts MD  Primary Care Provider: Primary Doctor No        Subjective:     Principal Problem:Acute hypoxemic respiratory failure        HPI:  Patient is a 32-year-old  female with past medical history significant for sickle cell anemia, end-stage renal disease (on hemodialysis every Tuesday/Thursday/Saturday), diabetes mellitus type 2, gastroparesis and hypertension who is being admitted to Hospital Medicine under inpatient status from [facility] Emergency Room where patient presented with worsening shortness of Breath started this morning.  Patient was scheduled to undergo removal of malfunctioning Apolinar catheter.  Patient reported compliance with dialysis therapy, patient did receive hemodialysis yesterday.  Patient has been coughing up purulent expectoration.  Denies any associated fevers or chills.  At present, patient is in significant respiratory distress and not able to provide further meaningful history of present illness.  Patient has 100% non-rebreather placed.  Patient denies any chest pain or palpitations.  Patient is reporting frequent nausea and abdominal pain.  Patient's blood sugars noted to be in excess of 600 mg%.      Overview/Hospital Course:  No notes on file    Interval History:  Last evening patient got intubated with worsening respiratory distress and need for change of hemodialysis catheter.  Patient underwent hemodialysis therapy.  Currently sedated.  Off intravenous Levophed.  Blood pressure on higher side.    Review of Systems   Unable to perform ROS: Intubated   Objective:     Vital Signs (Most Recent):  Temp: 98.06 °F (36.7 °C) (07/23/22 0800)  Pulse: 73 (07/23/22 0800)  Resp: (!) 21 (07/23/22 0800)  BP: (!) 165/108 (07/23/22  0904)  SpO2: 99 % (07/23/22 0800)   Vital Signs (24h Range):  Temp:  [95.36 °F (35.2 °C)-99.68 °F (37.6 °C)] 98.06 °F (36.7 °C)  Pulse:  [] 73  Resp:  [14-34] 21  SpO2:  [9 %-100 %] 99 %  BP: ()/() 165/108  Arterial Line BP: ()/() 150/88     Weight: 74.7 kg (164 lb 10.9 oz)  Body mass index is 30.12 kg/m².    Intake/Output Summary (Last 24 hours) at 7/23/2022 0917  Last data filed at 7/23/2022 0800  Gross per 24 hour   Intake 1954.86 ml   Output 4270 ml   Net -2315.14 ml      Physical Exam  Constitutional:       Appearance: She is well-developed.      Comments: Intubated and sedated   HENT:      Head: Normocephalic and atraumatic.   Eyes:      Conjunctiva/sclera: Conjunctivae normal.      Pupils: Pupils are equal, round, and reactive to light.   Neck:      Thyroid: No thyromegaly.      Vascular: No JVD.   Cardiovascular:      Rate and Rhythm: Normal rate and regular rhythm.      Heart sounds: No murmur heard.    No friction rub. No gallop.   Pulmonary:      Effort: Pulmonary effort is normal.      Breath sounds: Wheezing present.   Abdominal:      General: Bowel sounds are normal. There is no distension.      Palpations: Abdomen is soft. There is no mass.      Tenderness: There is no abdominal tenderness.   Musculoskeletal:         General: Normal range of motion.      Cervical back: Neck supple.   Skin:     General: Skin is warm and dry.   Neurological:      Cranial Nerves: No cranial nerve deficit.      Comments: Intubated and sedated       Significant Labs: All pertinent labs within the past 24 hours have been reviewed.  CBC:   Recent Labs   Lab 07/22/22  1008 07/23/22  0550   WBC 24.50* 27.07*   HGB 8.2* 7.1*   HCT 22.8* 19.2*    172     CMP:   Recent Labs   Lab 07/22/22  1008 07/22/22  1358 07/22/22  1941 07/23/22  0319 07/23/22  0550   *  --   --  139 139   K 4.1  --   --  3.9 3.7     --   --  108 106   CO2 18*  --   --  19* 21*   *   < > 237* 159* 166*    BUN 50*  --   --  36* 37*   CREATININE 4.4*  --   --  3.2* 3.4*   CALCIUM 7.8*  --   --  7.9* 7.7*   PROT  --   --   --   --  4.4*   ALBUMIN  --   --   --   --  1.7*   BILITOT  --   --   --   --  0.9   ALKPHOS  --   --   --   --  72   AST  --   --   --   --  15   ALT  --   --   --   --  18   ANIONGAP 14  --   --  12 12   EGFRNONAA 12*  --   --  18* 17*    < > = values in this interval not displayed.     Coagulation: No results for input(s): PT, INR, APTT in the last 48 hours.  Lactic Acid:   Recent Labs   Lab 07/22/22  1112 07/22/22  1358 07/22/22  1653   LACTATE 0.8 1.1 2.3*     Troponin: No results for input(s): TROPONINI in the last 48 hours.  TSH:   Recent Labs   Lab 07/08/22  1517   TSH 1.866     Urine Studies: No results for input(s): COLORU, APPEARANCEUA, PHUR, SPECGRAV, PROTEINUA, GLUCUA, KETONESU, BILIRUBINUA, OCCULTUA, NITRITE, UROBILINOGEN, LEUKOCYTESUR, RBCUA, WBCUA, BACTERIA, SQUAMEPITHEL, HYALINECASTS in the last 48 hours.    Invalid input(s): [facility]  Microbiology Results (last 7 days)       Procedure Component Value Units Date/Time    Blood culture x two cultures. Draw prior to antibiotics. [[id number]] Collected: 07/22/22 1132    Order Status: Completed Specimen: Blood from Antecubital, Right Arm Updated: 07/23/22 0545     Blood Culture, Routine No Growth to date    Narrative:      Aerobic and anaerobic    Blood culture x two cultures. Draw prior to antibiotics. [[id number]] Collected: 07/22/22 1112    Order Status: Completed Specimen: Blood from Antecubital, Right Arm Updated: 07/23/22 0545     Blood Culture, Routine No Growth to date    Narrative:      Aerobic and anaerobic    Culture, Respiratory with Gram Stain [[id number]] Collected: 07/22/22 1550    Order Status: Completed Specimen: Sputum, Expectorated Updated: 07/23/22 0315     Gram Stain (Respiratory) <10 epithelial cells per low power field.     Gram Stain (Respiratory) Rare WBC's     Gram Stain (Respiratory) No organisms seen       "    Significant Imaging:   CXR: New right basilar opacification suggesting pneumonia.  Interval removal of the right-sided central venous catheter and the left-sided central venous catheter has partially retracted with the distal tip now at the level of the confluence of the left brachiocephalic vein and SVC    CXR: Endotracheal tube ends in the brea and should be pulled back a short distance.  Central line placed in good position without pneumothorax.  Right lower lobe infiltrate consistent with pneumonia.    CXR: NG tube has been inserted with the tip at the level of the body of the stomach.  Tip of the endotracheal tube projects approximately 1.7 cm above the brea.  Bilateral lung infiltrates with right lung infiltrate not significantly changed or if anything slightly more confluent and with now mild left perihilar infiltrate along with left basilar opacification suggesting atelectasis            Assessment/Plan:      * Acute hypoxemic respiratory failure  Continue mechanical ventilation.  Follow Pulmonary recommendations.   Continue beta 2 agonist bronchodilator treatments.   Continue IV antibiotics Levaquin and vancomycin.  Pharmacist to dose Vancomycin.  Patient is penicillin allergic.  Check sputum GS and Cx.   Continue routine medications as before.             ARDS (adult respiratory distress syndrome)  Follow Pulmonary recommendations.      Hyperglycemia without ketosis  Continue intravenous insulin infusion per protocol.        ESRD (end stage renal disease)  Hemodialysis catheter was replaced by Dr. Gan.  Hemodialysis therapy as per Nephrology team.      HCAP (healthcare-associated pneumonia), RLL  As above.  See "acute hypoxemic respiratory failure" management.      Anemia  Anemia of chronic disease and sickle cell anemia.  Monitor serial CBC and transfuse if patient becomes hemodynamically unstable, symptomatic or H/H drops below 7/21.        Seizure  Continue antiseizure medications and follow " seizure precautions.      Gastroparesis  Supportive care with antiemetics and intravenous narcotics for pain control.      Type II diabetes mellitus  Patient's FSGs are uncontrolled due to hyperglycemia on current medication regimen.  Last A1c reviewed-   Lab Results   Component Value Date    HGBA1C 5.8 (H) 05/15/2022     Most recent fingerstick glucose reviewed-   Recent Labs   Lab 07/22/22  1313 07/22/22  1317 07/22/22  1427   POCTGLUCOSE >500* >500* >500*     Current correctional scale  High, starting IV Insulin infusion  Increase anti-hyperglycemic dose as follows-   Antihyperglycemics (From admission, onward)            Start     Stop Route Frequency Ordered    07/22/22 1600  insulin regular 1 Units/mL in sodium chloride 0.9% 100 mL infusion        Question Answer Comment   Insulin Rate Adjustment (DO NOT MODIFY ANSWER) [email or web address]    Enter initial dose from Infusion Protocol Chart (Units/hr): 5        -- IV Continuous 07/22/22 1448    07/22/22 1434  insulin aspart U-100 pen 0-5 Units         -- SubQ Before meals & nightly PRN 07/22/22 1434    07/22/22 1345  insulin regular injection 20 Units 0.2 mL         07/23 0144 IV ED 1 Time 07/22/22 1334        Hold Oral hypoglycemics while patient is in the hospital.      VTE Risk Mitigation (From admission, onward)         Ordered     heparin (porcine) injection 2,800 Units  As needed (PRN)         07/22/22 1625     heparin (porcine) injection 5,000 Units  Every 8 hours         07/22/22 1434     IP VTE HIGH RISK PATIENT  Once         07/22/22 1434     Place sequential compression device  Until discontinued         07/22/22 1434                Discharge Planning   TATIANA:  TBD    Code Status: Full Code   Is the patient medically ready for discharge?:     Reason for patient still in hospital (select all that apply): Patient trending condition and Consult recommendations  Discharge Plan A: Home with family             Critical care time spent on the evaluation and treatment of severe organ dysfunction, review of pertinent labs and imaging studies, discussions with consulting providers and discussions with patient/family: 31 minutes.      Tosin Roberts MD  Department of Hospital Medicine   [facility]

## 2022-07-23 NOTE — SUBJECTIVE & OBJECTIVE
Interval History:  Last evening patient got intubated with worsening respiratory distress and need for change of hemodialysis catheter.  Patient underwent hemodialysis therapy.  Currently sedated.  Off intravenous Levophed.  Blood pressure on higher side.    Review of Systems   Unable to perform ROS: Intubated   Objective:     Vital Signs (Most Recent):  Temp: 98.06 °F (36.7 °C) (07/23/22 0800)  Pulse: 73 (07/23/22 0800)  Resp: (!) 21 (07/23/22 0800)  BP: (!) 165/108 (07/23/22 0904)  SpO2: 99 % (07/23/22 0800)   Vital Signs (24h Range):  Temp:  [95.36 °F (35.2 °C)-99.68 °F (37.6 °C)] 98.06 °F (36.7 °C)  Pulse:  [] 73  Resp:  [14-34] 21  SpO2:  [9 %-100 %] 99 %  BP: ()/() 165/108  Arterial Line BP: ()/() 150/88     Weight: 74.7 kg (164 lb 10.9 oz)  Body mass index is 30.12 kg/m².    Intake/Output Summary (Last 24 hours) at 7/23/2022 0917  Last data filed at 7/23/2022 0800  Gross per 24 hour   Intake 1954.86 ml   Output 4270 ml   Net -2315.14 ml      Physical Exam  Constitutional:       Appearance: She is well-developed.      Comments: Intubated and sedated   HENT:      Head: Normocephalic and atraumatic.   Eyes:      Conjunctiva/sclera: Conjunctivae normal.      Pupils: Pupils are equal, round, and reactive to light.   Neck:      Thyroid: No thyromegaly.      Vascular: No JVD.   Cardiovascular:      Rate and Rhythm: Normal rate and regular rhythm.      Heart sounds: No murmur heard.    No friction rub. No gallop.   Pulmonary:      Effort: Pulmonary effort is normal.      Breath sounds: Wheezing present.   Abdominal:      General: Bowel sounds are normal. There is no distension.      Palpations: Abdomen is soft. There is no mass.      Tenderness: There is no abdominal tenderness.   Musculoskeletal:         General: Normal range of motion.      Cervical back: Neck supple.   Skin:     General: Skin is warm and dry.   Neurological:      Cranial Nerves: No cranial nerve deficit.       Comments: Intubated and sedated       Significant Labs: All pertinent labs within the past 24 hours have been reviewed.  CBC:   Recent Labs   Lab 07/22/22  1008 07/23/22  0550   WBC 24.50* 27.07*   HGB 8.2* 7.1*   HCT 22.8* 19.2*    172     CMP:   Recent Labs   Lab 07/22/22  1008 07/22/22  1358 07/22/22  1941 07/23/22  0319 07/23/22  0550   *  --   --  139 139   K 4.1  --   --  3.9 3.7     --   --  108 106   CO2 18*  --   --  19* 21*   *   < > 237* 159* 166*   BUN 50*  --   --  36* 37*   CREATININE 4.4*  --   --  3.2* 3.4*   CALCIUM 7.8*  --   --  7.9* 7.7*   PROT  --   --   --   --  4.4*   ALBUMIN  --   --   --   --  1.7*   BILITOT  --   --   --   --  0.9   ALKPHOS  --   --   --   --  72   AST  --   --   --   --  15   ALT  --   --   --   --  18   ANIONGAP 14  --   --  12 12   EGFRNONAA 12*  --   --  18* 17*    < > = values in this interval not displayed.     Coagulation: No results for input(s): PT, INR, APTT in the last 48 hours.  Lactic Acid:   Recent Labs   Lab 07/22/22  1112 07/22/22  1358 07/22/22  1653   LACTATE 0.8 1.1 2.3*     Troponin: No results for input(s): TROPONINI in the last 48 hours.  TSH:   Recent Labs   Lab 07/08/22  1517   TSH 1.866     Urine Studies: No results for input(s): COLORU, APPEARANCEUA, PHUR, SPECGRAV, PROTEINUA, GLUCUA, KETONESU, BILIRUBINUA, OCCULTUA, NITRITE, UROBILINOGEN, LEUKOCYTESUR, RBCUA, WBCUA, BACTERIA, SQUAMEPITHEL, HYALINECASTS in the last 48 hours.    Invalid input(s): [facility]  Microbiology Results (last 7 days)       Procedure Component Value Units Date/Time    Blood culture x two cultures. Draw prior to antibiotics. [[id number]] Collected: 07/22/22 1132    Order Status: Completed Specimen: Blood from Antecubital, Right Arm Updated: 07/23/22 0545     Blood Culture, Routine No Growth to date    Narrative:      Aerobic and anaerobic    Blood culture x two cultures. Draw prior to antibiotics. [[id number]] Collected: 07/22/22 1112    Order Status:  Completed Specimen: Blood from Antecubital, Right Arm Updated: 07/23/22 0545     Blood Culture, Routine No Growth to date    Narrative:      Aerobic and anaerobic    Culture, Respiratory with Gram Stain [[id number]] Collected: 07/22/22 1550    Order Status: Completed Specimen: Sputum, Expectorated Updated: 07/23/22 0315     Gram Stain (Respiratory) <10 epithelial cells per low power field.     Gram Stain (Respiratory) Rare WBC's     Gram Stain (Respiratory) No organisms seen          Significant Imaging:   CXR: New right basilar opacification suggesting pneumonia.  Interval removal of the right-sided central venous catheter and the left-sided central venous catheter has partially retracted with the distal tip now at the level of the confluence of the left brachiocephalic vein and SVC    CXR: Endotracheal tube ends in the brea and should be pulled back a short distance.  Central line placed in good position without pneumothorax.  Right lower lobe infiltrate consistent with pneumonia.    CXR: NG tube has been inserted with the tip at the level of the body of the stomach.  Tip of the endotracheal tube projects approximately 1.7 cm above the brea.  Bilateral lung infiltrates with right lung infiltrate not significantly changed or if anything slightly more confluent and with now mild left perihilar infiltrate along with left basilar opacification suggesting atelectasis

## 2022-07-23 NOTE — PROGRESS NOTES
Consent and Hep B status verified, removed 3000 uf net, no issues with catheter, dressing changed, CDI. Report given to SHAILA Ty.     07/22/22 2050   Handoff Report   Received From Eun   Given To Melony   Vital Signs   Temp 96.08 °F (35.6 °C)   Temp src Core Rectal   Pulse (!) 113   Heart Rate Source Monitor   Resp (!) 30   SpO2 100 %   Flow (L/min) 15   Oxygen Concentration (%) 100   O2 Device (Oxygen Therapy) ventilator   BP Location Left arm   BP Method Automatic   Patient Position Lying   ETCO2 (mmHg) 20 mmHg   Art Line   Arterial Line /85   Arterial Line MAP (mmHg) 91 mmHg   Assessments (Pre/Post)   Consent Obtained yes   Safety vein preservation armband present   Date Hepatitis Profile Obtained 07/07/22   Blood Liters Processed (BLP) 65   Transport Modality bed   Level of Consciousness (AVPU) unresponsive   Pain   Preferred Pain Scale rFLACC (Revised Face Legs Arms Cry Consolability Scale)   Comfort/Acceptable Pain Level 0   Pain Rating (0-10): Rest 0   Pain Rating (0-10): Activity 0   rFLACC Pain Rating: - Face 0-->no particular expression or smile   rFLACC Pain Rating: - Legs 0-->normal position or relaxed   rFLACC Pain Rating: - Activity 0-->lying quietly, normal position, moves easily   rFLACC Pain Rating: - Cry 0-->no cry (awake or asleep)   rFLACC Pain Rating: - Consolability 0-->content, relaxed   rFLACC Score: 0   Pain Management Interventions quiet environment facilitated   Pain/Comfort Interventions   Fever Reduction/Comfort Measures lightweight bedding;lightweight clothing   Pre-Hemodialysis Assessment   Additional Dialysis Information Needed Yes   Patient Status Departed   Treatment Consent Verified Yes   Treatment Status Completed   Trialysis (Dialysis) Catheter 07/22/22 1533 right internal jugular   Placement Date/Time: 07/22/22 1533   Hand Hygiene: Performed  Barrier Precautions: Performed  Skin Antisepsis: ChloraPrep  Location: right internal jugular  Insertion attempts (enter  comment if more than 2 attempts): 1  Guidewire Removed?: Yes  Guidew...   Site Assessment No drainage;No redness;No swelling;No warmth   Line Securement Device Secured with sutures   Dressing Type Biopatch in place;Central line dressing   Dressing Status Intact;New drainage   Dressing Intervention Integrity maintained   Date on Dressing 07/22/22   Dressing Due to be Changed 07/29/22   Venous Patency/Care flushed w/o difficulty;normal saline locked   Arterial Patency/Care flushed w/o difficulty;normal saline locked   Line Necessity Review CRRT/HD   Post-Hemodialysis Assessment   Rinseback Volume (mL) 250 mL   Blood Volume Processed (Liters) 65 L   Dialyzer Clearance Lightly streaked   Additional Fluid Intake (mL) 1000 mL   Total UF (mL) 4000 mL   Net Fluid Removal 3000   Patient Response to Treatment lamar   Post-Treatment Weight 75.2 kg (165 lb 12.6 oz)   Treatment Weight Change -3   Post-Hemodialysis Comments stable   Edema   Edema foot, right;foot, left;ankle, right;ankle, left;knee, right;knee, left;leg, right;leg, left;arm, left;arm, right

## 2022-07-23 NOTE — PT/OT/SLP PROGRESS
Occupational Therapy      Patient Name:  Tabby Howard   MRN:  [id number]    Patient not seen today secondary to Other (Comment) (Pt intubated per Nurse Subha. OT discontinued orders and will require new OT orders as appropriate.).    7/23/2022

## 2022-07-23 NOTE — CARE UPDATE
Dr. Berman called.  ABG read to her.  Changes made were Fio2 to 50%, Peep 10 and Rate to 14.  Repeat blood gas in one hour.    07/23/22 0432   Patient Assessment/Suction   Level of Consciousness (AVPU) unresponsive   Respiratory Effort Unlabored   Expansion/Accessory Muscles/Retractions no retractions;no use of accessory muscles   All Lung Fields Breath Sounds Anterior:;Lateral:;coarse   LLL Breath Sounds coarse   RML Breath Sounds coarse   RLL Breath Sounds coarse   Rhythm/Pattern, Respiratory assisted mechanically   Cough Frequency no cough   Cough Type productive   Suction Method tracheal   $ Suction Charges Inline Suction Procedure Stat Charge   Secretions Amount moderate   Secretions Color red;red-streaked   Secretions Characteristics thin   PRE-TX-O2   O2 Device (Oxygen Therapy) ventilator   $ Is the patient on Low Flow Oxygen? Yes   Oxygen Concentration (%) 100   SpO2 100 %   Pulse (!) 177   Resp (!) 30   Temp 99.5 °F (37.5 °C)   ETCO2   ETCO2 (mmHg) 17 mmHg      Airway Anesthesia 07/22/22   Placement Date/Time: 07/22/22 (c) 1500   Method of Intubation: Video Laryngoscopy  Airway Device: Endotracheal Tube  Mask Ventilation: Mask ventilation not attempted  Intubated: Postinduction  Blade: Byrd #3  Airway Device Size: 7.5  Cuff Inflation...   Secured at 24 cm   Measured At Lips   Secured Location Right   Secured by Commercial tube ortiz   Bite Block right   Site Condition Dry;Cool   Status Intact;Secured   Site Assessment Clean;Dry   Cuff Pressure 28 cm H2O   Airway Safety   Ambu bag with the patient? Yes, Adult Ambu   ETT at Bedside? No   ETT Size 7.5   Respiratory Interventions   Cough And Deep Breathing unable to perform   Airway/Ventilation Management airway patency maintained   Vent Select   Conventional Vent Y   Ventilator Initiated No   $ Ventilator Subsequent 1   Charged w/in last 24h YES   Preset Conventional Ventilator Settings   Vent Type    Ventilation Type VC   Vent Mode A/C   Humidity  HME   Set Rate 30 BPM   Vt Set 450 mL   PEEP/CPAP 18 cmH20   Pressure Support 0 cmH20   Waveform RAMP   Peak Flow 70 L/min   Set Inspiratory Pressure 0 cmH20   Insp Time 0 Sec(s)   Plateau Set/Insp. Hold (sec) 0   Insp Rise Time  0 %   Trigger Sensitivity Flow/I-Trigger 3 L/min   P High 0 cm H2O   P Low 0 cm H2O   T High 0 sec   T Low 0 sec   Patient Ventilator Parameters   Resp Rate Total 30 br/min   Peak Airway Pressure 35 cmH2O   Mean Airway Pressure 25 cmH20   Plateau Pressure 0 cmH20   Exhaled Vt 473 mL   Total Ve 14.2 mL   Spont Ve 0 L   I:E Ratio Measured 1:1.90   Conventional Ventilator Alarms   Alarms On Y   Ve High Alarm 55.5 L/min   Resp Rate High Alarm 57 br/min   Press High Alarm 76 cmH2O   Apnea Rate 10   Apnea Volume (mL) 365 mL   Apnea Oxygen Concentration  100   Apnea Flow Rate (L/min) 44   T Apnea 20 sec(s)   IHI Ventilator Associated Pneumonia Bundle (Required)   Head of Bed Elevated  HOB 30   Ready to Wean/Extubation Screen   FIO2<=50 (chart decimal) (!) 1   MV<16L (chart vol.) 14.2   PEEP <=8 (chart #) (!) 18   Ready to Wean Parameters   F/VT Ratio<105 (RSBI) (!) 63.42   Labs   $ Was an ABG obtained? A Line;ISTAT - Blood gas   $ Labs Tech Time 15 min   Critical Value Communication   Date Result Received 07/23/22   Time Result Received 0432   Resulting Department of Critical Value resp   Who communicated critical value from resulting department? vmangus   Critical Test #1 ph   Critical Test #1 Result 7.596   Critical Test #2 pco2   Critical Test #2 Result 25.2   Critical Test #3  po2   Critical Test #3 Result 467   Date Notified 07/23/22   Time Notified 0501   Read Back Verification Yes

## 2022-07-23 NOTE — PROGRESS NOTES
INPATIENT NEPHROLOGY PROGRESS  [facility] NEPHROLOGY    Tabby Howard  07/23/2022    Reason for consultation:    ESRD    Chief Complaint:   Chief Complaint   Patient presents with    Abdominal Pain     Patient presented to pre-op for dialysis cath changed and was in extreme abdominal pain and was brought to the er           History of Present Illness:    Pt is a 31 yo woman with h/o ESRD on hemodialysis, diabetes, hypertension, anemia,  Supraventricular tachycardia, heart failure with preserved ejection fraction, sickle cell trait, and secondary hyperparathyroidism who was referred to the emergency room from her outpatient dialysis unit.  She experienced bleeding from her hemosplit catheter sight while undergoing dialysis.  I called the outpatient unit and they stated that she starting bleeding and her shirt was covered in blood and there was a puddle on the floor.  The bleeding stopped when hemodialysis was discontinued.  She isn't very cooperative with questioning.  She states she has nausea, shortness of breath and weakness. She denies chest pain, fever, neurologic deficits, or urinary or bowel complaints.      7/23  Had HD yesterday once line replaced.  3L UF, pt was put on pressors as SBP dropped into the 50's almost immediately.  She is intubated and sedated.  Addendum:  D/w Dr. Benitez--d/c'd HD orders, he would like to see pt first as she has been so unstable        Plan of Care:       Assessment:    ESRD  --PRN HD schedule for now  --fluid restrict  --renal dose medication     hyperglycemia  --per primary    Anemia due to acute blood loss and esrd  --heparin free hd  --epogen with hd  --blood products at discretion of primary    Pneumonia  --cefepime and Vancomycin ordered  --oxygen supplementation as necessary    Hypertension  --fluid restrict  --uf with hd  --low salt diet    Hyponatremia  --improved after HD  --fluid restict when taking PO  --uf with hd    Metabolic acidosis  --35 bicarb  bath      Thank you for allowing us to participate in this patient's care. We will continue to follow.    Vital Signs:  Temp Readings from Last 3 Encounters:   22 98.06 °F (36.7 °C)   22 98.7 °F (37.1 °C) (Temporal)   22 98 °F (36.7 °C)       Pulse Readings from Last 3 Encounters:   22 73   22 91   22 96       BP Readings from Last 3 Encounters:   22 (!) 165/106   22 (!) 153/95   22 (!) 160/99       Weight:  Wt Readings from Last 3 Encounters:   22 74.7 kg (164 lb 10.9 oz)   22 74.8 kg (165 lb)   22 76.1 kg (167 lb 12.3 oz)       Past Medical & Surgical History:  Past Medical History:   Diagnosis Date    CKD (chronic kidney disease), stage IV 2022    Diabetes mellitus due to underlying condition with unspecified complications 2022    Gastroparesis 2022    Heart failure with preserved ejection fraction 2022    EF 55% on 3/22    History of gastroesophageal reflux (GERD)     History of supraventricular tachycardia     Hyperkalemia 2022    Hypertensive emergency 2022    Sickle cell trait 2022    Type 2 diabetes mellitus        Past Surgical History:   Procedure Laterality Date     SECTION      x 3    ESOPHAGOGASTRODUODENOSCOPY N/A 10/18/2019    Procedure: ESOPHAGOGASTRODUODENOSCOPY (EGD);  Surgeon: Gianluca Mendez MD;  Location: [facility];  Service: Endoscopy;  Laterality: N/A;    PLACEMENT OF DUAL-LUMEN VASCULAR CATHETER Left 2022    Procedure: INSERTION-CATHETER-JOSEPH;  Surgeon: Dionte Gan MD;  Location: Replaced by [facility];  Service: General;  Laterality: Left;       Past Social History:  Social History     Socioeconomic History    Marital status:    Tobacco Use    Smoking status: Never Smoker    Smokeless tobacco: Never Used   Substance and Sexual Activity    Alcohol use: No    Drug use: No    Sexual activity: Not Currently     Partners: Male     Birth control/protection: I.U.D.        Medications:  Current Facility-Administered Medications on File Prior to Encounter   Medication Dose Route Frequency Provider Last Rate Last Admin    [COMPLETED] ondansetron injection 4 mg  4 mg Intravenous Once Felix Reinoso MD   4 mg at 07/22/22 0904    [DISCONTINUED] 0.9%  NaCl infusion   Intravenous Continuous Dionte Gan MD 70 mL/hr at 07/22/22 0830 70 mL at 07/22/22 0830    [DISCONTINUED] clindamycin in D5W 900 mg/50 mL IVPB 900 mg  900 mg Intravenous On Call Procedure Dionte Gan MD        [DISCONTINUED] electrolyte-S (ISOLYTE)   Intravenous Continuous Felix Reinoso MD        [DISCONTINUED] famotidine (PF) injection 20 mg  20 mg Intravenous Once Felix Reinoso MD        [DISCONTINUED] fentaNYL 50 mcg/mL injection 25 mcg  25 mcg Intravenous Q10 Min PRN Felix Reinoso MD   25 mcg at 07/22/22 0903    [DISCONTINUED] lactated ringers infusion   Intravenous Continuous Felix Reinoso MD        [DISCONTINUED] LIDOcaine (PF) 10 mg/ml (1%) injection 10 mg  1 mL Intradermal Once Felix Reinoso MD         Current Outpatient Medications on File Prior to Encounter   Medication Sig Dispense Refill    amLODIPine (NORVASC) 10 MG tablet Take 1 tablet (10 mg total) by mouth once daily. 30 tablet 11    atenoloL (TENORMIN) 50 MG tablet Take 1 tablet (50 mg total) by mouth every other day. 45 tablet 3    famotidine (PEPCID) 20 MG tablet Take 1 tablet (20 mg total) by mouth once daily. 30 tablet 0    FLUoxetine 20 MG capsule Take 1 capsule (20 mg total) by mouth once daily. 30 capsule 1    HYDROcodone-acetaminophen (NORCO) 5-325 mg per tablet Take 1 tablet by mouth every 6 (six) hours as needed for Pain. 20 tablet 0    insulin aspart U-100 (NOVOLOG) 100 unit/mL (3 mL) InPn pen Inject 1-10 Units into the skin before meals and at bedtime as needed (Hyperglycemia). 3 mL 3    isosorbide mononitrate (IMDUR) 60 MG 24 hr tablet Take 2 tablets (120 mg total) by mouth once daily. 30 tablet 1    LANTUS  U-100 INSULIN 100 unit/mL injection SMARTSI Unit(s) SUB-Q Every Morning      levETIRAcetam (KEPPRA) 500 MG Tab Take 1 tablet (500 mg total) by mouth 2 (two) times daily. 60 tablet 1    polyethylene glycol (GLYCOLAX) 17 gram/dose powder Take 17 g by mouth 2 (two) times daily. 510 each 0    predniSONE (DELTASONE) 20 MG tablet Take 3 tablets (60 mg total) by mouth once daily for 4 days, THEN 2 tablets (40 mg total) once daily for 4 days, THEN 1 tablet (20 mg total) once daily for 4 days, THEN 0.5 tablets (10 mg total) once daily for 4 days. 26 tablet 0    [DISCONTINUED] furosemide (LASIX) 40 MG tablet Take 1 tablet (40 mg total) by mouth 2 (two) times daily. 60 tablet 0    [DISCONTINUED] pantoprazole (PROTONIX) 40 MG tablet Take 1 tablet (40 mg total) by mouth once daily. 30 tablet 3    [DISCONTINUED] torsemide (DEMADEX) 20 MG Tab Take 1 tablet (20 mg total) by mouth 2 (two) times a day. (Patient taking differently: Take 20 mg by mouth once daily.) 60 tablet 1     Scheduled Meds:   sodium chloride 0.9%   Intravenous Once    amLODIPine  10 mg Oral Daily    atenoloL  50 mg Oral Every other day    famotidine  20 mg Oral Daily    FLUoxetine  20 mg Oral Daily    folic acid  1 mg Oral Daily    furosemide  40 mg Oral BID    heparin (porcine)  5,000 Units Subcutaneous Q8H    isosorbide mononitrate  120 mg Oral Daily    levETIRAcetam  500 mg Oral BID    levoFLOXacin  500 mg Intravenous Q48H    mupirocin   Nasal BID    pantoprazole  40 mg Oral Daily    polyethylene glycol  17 g Oral BID    sodium chloride 0.9%  10 mL Intravenous Q8H    vancomycin (VANCOCIN) IVPB  500 mg Intravenous Once     Continuous Infusions:   insulin regular 1 units/mL infusion orderable (HHS) 0.15 Units/hr (22)    NORepinephrine bitartrate-D5W 0.02 mcg/kg/min (22)    propofoL 35 mcg/kg/min (22)     PRN Meds:.    Allergies:  Penicillins    Past Family History:  Reviewed; refer to Hospitalist  "Admission Note    Review of Systems:  Review of Systems - All 14 systems reviewed and negative, except as noted in HPI    Physical Exam:    BP (!) 165/106   Pulse 73   Temp 98.06 °F (36.7 °C)   Resp 14   Ht 5' 2" (1.575 m)   Wt 74.7 kg (164 lb 10.9 oz)   SpO2 100%   Breastfeeding No   BMI 30.12 kg/m²     General Appearance:   ill appearing   Head:    Normocephalic, without obvious abnormality, atraumatic   Eyes:    PER, conjunctiva/corneas clear, EOM's intact in both eyes        Throat:   Lips, mucosa, and tongue normal; teeth and gums normal   Back:     Symmetric, no curvature, ROM normal, no CVA tenderness   Lungs:     Right sided rhonchi   Chest wall:    No tenderness or deformity   Heart:    Regular rate and rhythm, S1 and S2 normal, no murmur, rub   or gallop   Abdomen:     Tender.  No rigidity or guarding.    Extremities:   Extremities normal, atraumatic, no cyanosis or edema   Pulses:   2+ and symmetric all extremities   MSK:   No joint or muscle swelling, tenderness or deformity   Skin:   Skin color, texture, turgor normal, no rashes or lesions   Neurologic:      No flap     Results:  Lab Results   Component Value Date     07/23/2022    K 3.7 07/23/2022     07/23/2022    CO2 21 (L) 07/23/2022    BUN 37 (H) 07/23/2022    CREATININE 3.4 (H) 07/23/2022    CALCIUM 7.7 (L) 07/23/2022    ANIONGAP 12 07/23/2022    ESTGFRAFRICA 19 (A) 07/23/2022    EGFRNONAA 17 (A) 07/23/2022       Lab Results   Component Value Date    CALCIUM 7.7 (L) 07/23/2022    PHOS 4.5 07/23/2022       Recent Labs   Lab 07/23/22  0550   WBC 27.07*   RBC 2.41*   HGB 7.1*   HCT 19.2*      MCV 80*   MCH 29.5   MCHC 37.0*          I have personally reviewed pertinent radiological imaging and reports.    Patient care was time spent personally by me on the following activities:   · Obtaining a history  · Examination of patient.  · Providing medical care at the patients bedside.  · Developing a treatment plan with patient " or surrogate and bedside caregivers  · Ordering and reviewing laboratory studies, radiographic studies, pulse oximetry.  · Ordering and performing treatments and interventions.  · Evaluation of patient's response to treatment.  · Discussions with consultants while on the unit and immediately available to the patient.  · Re-evaluation of the patient's condition.  · Documentation in the medical record.       Geeta Kaur NP    Nephrology  [facility] [place]  [phone]

## 2022-07-23 NOTE — CARE UPDATE
07/23/22 1434   Preset Conventional Ventilator Settings   Vent Type    Ventilation Type VC   Vent Mode A/C   Humidity HME   Set Rate (S)  10 BPM   Vt Set 450 mL   PEEP/CPAP 10 cmH20   Pressure Support 0 cmH20   Waveform RAMP   Peak Flow 70 L/min   Set Inspiratory Pressure 0 cmH20   Fio2 40% per Dr. NILS Berman

## 2022-07-23 NOTE — HOSPITAL COURSE
Patient was admitted to intensive care unit for acute hypoxemic respiratory failure.  Due to worsening respiratory status and hemodynamic instability, patient was intubated and subsequently underwent replacement of Apolinar catheter.  Patient received hemodialysis subsequently.  Currently patient is receiving broad-spectrum antibiotics for healthcare associated pneumonia and possible ARDS.  Dr. Miller from Pulmonary Medicine is closely monitoring and managing the ventilator.  Patient briefly required intravenous Levophed infusion.  Nephrology team following.  Upon improvement, patient got extubated.  Awaiting swallow evaluation.  Consulting PT/OT and ST. Insulin drip has been stopped and patient is requiring low-dose insulin sliding scale for coverage.

## 2022-07-23 NOTE — PROGRESS NOTES
Pharmacokinetic Assessment Follow Up: IV Vancomycin    Vancomycin serum concentration assessment(s):    The random level was drawn correctly and can be used to guide therapy at this time. The measurement is within the desired definitive target range of 15 to 20 mcg/mL.    Vancomycin Regimen Plan:    Patient scheduled for HD today.Vanc dose will be 500 mg at 1700 postHD on 07/23/22.    Vancomycin level ordered with AM labs on 07/24/22.    Drug levels (last 3 results):  Recent Labs   Lab Result Units 07/23/22  0408   Vancomycin, Random ug/mL 15.1       Pharmacy will continue to follow and monitor vancomycin.    Please contact pharmacy at extension [phone] for questions regarding this assessment.    Thank you for the consult,   Thanh Lanza       Patient brief summary:  Tabby Howard is a 32 y.o. female initiated on antimicrobial therapy with IV Vancomycin for treatment of lower respiratory infection    The patient's current regimen is pulse dosing    Drug Allergies:   Review of patient's allergies indicates:   Allergen Reactions    Penicillins Hives       Actual Body Weight:   74.7kg    Renal Function:   Estimated Creatinine Clearance: 23.6 mL/min (A) (based on SCr of 3.2 mg/dL (H)).,     Dialysis Method (if applicable):  intermittent HD    CBC (last 72 hours):  Recent Labs   Lab Result Units 07/22/22  1008 07/22/22  1653   WBC K/uL 24.50*  --    Hemoglobin g/dL 8.2*  --    Hemoglobin A1C %  --  6.0*   Hematocrit % 22.8*  --    Platelets K/uL 187  --    Gran % % 92.2*  --    Lymph % % 2.6*  --    Mono % % 3.8*  --    Eosinophil % % 0.1  --    Basophil % % 0.1  --    Differential Method  Automated  --        Metabolic Panel (last 72 hours):  Recent Labs   Lab Result Units 07/22/22  1008 07/22/22  1358 07/22/22  1436 07/22/22  1653 07/22/22  1941 07/23/22  0319   Sodium mmol/L 134*  --   --   --   --  139   Potassium mmol/L 4.1  --   --   --   --  3.9   Chloride mmol/L 102  --   --   --   --  108   CO2 mmol/L 18*  --    --   --   --  19*   Glucose mg/dL 601* 615* 617* 451* 237* 159*   BUN mg/dL 50*  --   --   --   --  36*   Creatinine mg/dL 4.4*  --   --   --   --  3.2*       Vancomycin Administrations:  vancomycin given in the last 96 hours                     vancomycin 1.5 g in dextrose 5 % 250 mL IVPB (ready to mix) (mg) 1,500 mg New Bag 07/22/22 1141                    Microbiologic Results:  Microbiology Results (last 7 days)       Procedure Component Value Units Date/Time    Culture, Respiratory with Gram Stain [[id number]] Collected: 07/22/22 1550    Order Status: Completed Specimen: Sputum, Expectorated Updated: 07/23/22 0315     Gram Stain (Respiratory) <10 epithelial cells per low power field.     Gram Stain (Respiratory) Rare WBC's     Gram Stain (Respiratory) No organisms seen    Blood culture x two cultures. Draw prior to antibiotics. [[id number]] Collected: 07/22/22 1132    Order Status: Sent Specimen: Blood from Antecubital, Right Arm Updated: 07/22/22 2202    Blood culture x two cultures. Draw prior to antibiotics. [[id number]] Collected: 07/22/22 1112    Order Status: Sent Specimen: Blood from Antecubital, Right Arm Updated: 07/22/22 2202

## 2022-07-23 NOTE — PLAN OF CARE
[facility]  Initial Discharge Assessment       Primary Care Provider: Primary Doctor No    Admission Diagnosis: Respiratory failure, acute [J96.00]  Abdominal pain [R10.9]    Admission Date: 7/22/2022  Expected Discharge Date:  Pt recently admitted 7/7/22-7/17/22 and discharged home with her  Father Garcia Howell [phone] with new HD set up at [facility] 11:30 am. Pt has a home RW and glucometer and reports her PCP as DR. Cervantes. Pt has Medicaid insurance and uses [facility] pharmacy. PTs discharge plan is home with family. CM following.     Discharge Barriers Identified: None    Payor: MEDICAID / Plan: HEALTHY BLUE ([employer] [place]) / Product Type: Managed Medicaid /     Extended Emergency Contact Information  Primary Emergency Contact: Garcia Howell  Mobile Phone: [phone]  Relation: Father  Preferred language: English   needed? No  Secondary Emergency Contact: arlen howell  Mobile Phone: [phone]  Relation: Step parent  Preferred language: English   needed? No    Discharge Plan A: Home with family  Discharge Plan B: Home with family, Home Health      [id number] - [place], [place] - [address] [facility] [address]  [address]  [facility] [address]  Phone: [phone] Fax: [phone]      Initial Assessment (most recent)     Adult Discharge Assessment - 07/23/22 1035        Discharge Assessment    Assessment Type Discharge Planning Assessment     Confirmed/corrected address, phone number and insurance Yes     Confirmed Demographics Correct on Facesheet     Source of Information patient;family;health record     Communicated TATIANA with patient/caregiver Yes     Reason For Admission Respiratory failure     Lives With parent(s)     Facility Arrived From: home     Do you expect to return to your current living situation? Yes     Do you have help at home or someone to help you manage your care at home? Yes      Who are your caregiver(s) and their phone number(s)? Father Garcia Howard [phone]     Prior to hospitilization cognitive status: Alert/Oriented     Current cognitive status: Coma/Sedated/Intubated     Walking or Climbing Stairs Difficulty none     Dressing/Bathing Difficulty none     Home Layout Able to live on 1st floor     Equipment Currently Used at Home walker, rolling;glucometer     Readmission within 30 days? Yes     Patient currently being followed by outpatient case management? No     Do you currently have service(s) that help you manage your care at home? No     Do you take prescription medications? Yes     Do you have prescription coverage? Yes     Do you have any problems affording any of your prescribed medications? No     Is the patient taking medications as prescribed? yes     Who is going to help you get home at discharge? Father Garcia Howard [phone]     How do you get to doctors appointments? car, drives self     Are you on dialysis? Yes     Dialysis Name and Scheduled days [facility] 11:30 am     Do you take coumadin? No     Discharge Plan A Home with family     Discharge Plan B Home with family;Home Health     DME Needed Upon Discharge  none     Discharge Plan discussed with: Patient;Parent(s)     Name(s) and Number(s) Father Garcia Howard [phone]     Discharge Barriers Identified None        Relationship/Environment    Name(s) of Who Lives With Patient Father Garcia Howard [phone]

## 2022-07-23 NOTE — CARE UPDATE
Notified by nursing that patient's glucose 181 on insulin gtt. & inquiring about D51/2.     Plan:  Continue insulin gtt   Q1 hr accu checks  BMP-pending  Avoid D5 1/2    Plan discussed with EICU provider and nurse.

## 2022-07-23 NOTE — ASSESSMENT & PLAN NOTE
Continue mechanical ventilation.  Follow Pulmonary recommendations.   Continue beta 2 agonist bronchodilator treatments.   Continue IV antibiotics Levaquin and vancomycin.  Pharmacist to dose Vancomycin.  Patient is penicillin allergic.  Check sputum GS and Cx.   Continue routine medications as before.

## 2022-07-23 NOTE — NURSING
Dialysis started. Pt immediately dropped pressure to the 50's systolic. Bradycardic 50's.   Levophed started and titrated up as needed. Bp recovered. Dialysis resumed.

## 2022-07-23 NOTE — PLAN OF CARE
POC reviewed w/ pt- no evidence of learning. Pt remains intubated w/ FiO2 at 50%. Moderate amount of pink, red tinged secretions. Sedated on propofol at 35 mcg/kg/ min. Levophed for BP control at 0.02 mcg/kg/min. Right radial a-line in place. PIV x 1, right trialysis, and midline in place. NSR to sinus tach on the monitor. Retaining urine. Bladder scan done- 380 mL. Straight cath done per NILS Parr NP- 270 mL output. Insulin gtt at 0.15 units/hr. Insulin gtt left on when <200 per eICU and NILS Parr NP. Will continue to monitor blood sugars q1hr. Pt became restless during bath and attempting to pull out ETT. Following commands and nodding appropriately.  Pt remains in bilateral wrist restraints. Safety maintained.     Problem: Adult Inpatient Plan of Care  Goal: Plan of Care Review  Outcome: Ongoing, Progressing  Goal: Patient-Specific Goal (Individualized)  Outcome: Ongoing, Progressing  Goal: Absence of Hospital-Acquired Illness or Injury  Outcome: Ongoing, Progressing  Goal: Optimal Comfort and Wellbeing  Outcome: Ongoing, Progressing

## 2022-07-23 NOTE — PLAN OF CARE
ETT intact. Large amts of pink colored secretions. Several episodes of dropping sats. Remains on Fio2 100%.  Titrating Diprivan for sedation. BP very labile and requiring Levophed once dialysis was started. Titrating to maintain adequate bp.  Abdomen soft. Bruised area to lower abdomen. No urine output. Female external cath in place.   Soft wrist restraints on. Safety maintained.

## 2022-07-23 NOTE — EICU
D/w NP Parr on glucose management- plan recheck BMP and decide on next steps  ARDS on high PEEP  Prior echo mod pericardial effusion

## 2022-07-23 NOTE — PROGRESS NOTES
Progress Note  Pulmonary/Critical Care      PATIENT NAME: Tabby Howard  MRN: [id number]  TODAY'S DATE: 2022  1:18 PM  ADMIT DATE: 2022  AGE: 32 y.o. : 1990    HPI/INTERVAL HISTORY (See H&P for complete P,F,SHx)   The patient is a 32-year-old female with end-stage renal disease who presented in pulmonary edema.  She had gone to dialysis but had some bleeding from her HemoSplit.  That HemoSplit has been removed and she now has a Trialysis catheter in the right IJ.  she was dialyzed last night and her ventilator requirements have come down dramatically.  She does still have some pulmonary edema on chest x-ray.    REVIEW OF SYSTEMS  Unobtainable    VITAL SIGNS (MOST RECENT)  Temp: 96.44 °F (35.8 °C) (22 1130)  Pulse: 69 (22 1130)  Resp: (!) 21 (22 1130)  BP: (!) 162/108 (22 1130)  SpO2: 100 % (22 1130)    INTAKE AND OUTPUT (LAST 24 HOURS):    Intake/Output Summary (Last 24 hours) at 2022 1318  Last data filed at 2022 1134  Gross per 24 hour   Intake 1863.93 ml   Output 4270 ml   Net -2406.07 ml       WEIGHT  Wt Readings from Last 1 Encounters:   22 74.7 kg (164 lb 10.9 oz)       PHYSICAL EXAM  GENERAL:  Mid aged patient in no distress.  HEENT: Pupils equal and reactive. Extraocular movements intact. Nose intact. Pharynx intubated  NECK: Supple.  Right Trialysis catheter  HEART: Regular rate and rhythm. No murmur or gallop auscultated.  LUNGS:  There are bibasilar crackles Lung excursion symmetrical. No change in fremitus.   ABDOMEN: Bowel sounds present. Non-tender, no masses palpated.  : Normal anatomy.  EXTREMITIES: Normal muscle tone and joint movement, no cyanosis or clubbing.   LYMPHATICS: No adenopathy palpated, no edema.  SKIN: Dry, intact, no lesions.   NEURO:  Sedated but moves all extremities  PSYCH:  Unable to assess    VENTILATOR SETTINGS  Vent Mode: A/C  Oxygen Concentration (%):  [] 50  Resp Rate Total:  [0 br/min-58 br/min] 21  br/min  Vt Set:  [450 mL-500 mL] 450 mL  PEEP/CPAP:  [10 wcU18-86 cmH20] 10 cmH20  Pressure Support:  [0 cmH20] 0 cmH20  Mean Airway Pressure:  [14 dfF12-97 cmH20] 15 cmH20       LAST ARTERIAL BLOOD GAS  ABG  Recent Labs   Lab 07/23/22  0558   PH 7.406   PO2 80   PCO2 37.2   HCO3 23.4*   BE -1         CBC LAST (LAST 24 HOURS)  Recent Labs   Lab 07/23/22  0550   WBC 27.07*   RBC 2.41*   HGB 7.1*   HCT 19.2*   MCV 80*   MCH 29.5   MCHC 37.0*   RDW 15.4*      MPV 10.1       CHEMISTRY LAST (LAST 24 HOURS)  Recent Labs   Lab 07/23/22  0550 07/23/22  0558     --    K 3.7  --      --    CO2 21*  --    ANIONGAP 12  --    BUN 37*  --    CREATININE 3.4*  --    *  --    CALCIUM 7.7*  --    PH  --  7.406   MG 1.7  --    ALBUMIN 1.7*  --    PROT 4.4*  --    ALKPHOS 72  --    ALT 18  --    AST 15  --    BILITOT 0.9  --          LAST 7 DAYS MICROBIOLOGY   Microbiology Results (last 7 days)       Procedure Component Value Units Date/Time    Blood culture x two cultures. Draw prior to antibiotics. [[id number]] Collected: 07/22/22 1132    Order Status: Completed Specimen: Blood from Antecubital, Right Arm Updated: 07/23/22 0545     Blood Culture, Routine No Growth to date    Narrative:      Aerobic and anaerobic    Blood culture x two cultures. Draw prior to antibiotics. [[id number]] Collected: 07/22/22 1112    Order Status: Completed Specimen: Blood from Antecubital, Right Arm Updated: 07/23/22 0545     Blood Culture, Routine No Growth to date    Narrative:      Aerobic and anaerobic    Culture, Respiratory with Gram Stain [[id number]] Collected: 07/22/22 1550    Order Status: Completed Specimen: Sputum, Expectorated Updated: 07/23/22 0315     Gram Stain (Respiratory) <10 epithelial cells per low power field.     Gram Stain (Respiratory) Rare WBC's     Gram Stain (Respiratory) No organisms seen            MOST RECENT IMAGING  X-Ray Chest AP Portable  Narrative: EXAMINATION:  XR CHEST AP PORTABLE    CLINICAL  HISTORY:  intubated;.    TECHNIQUE:  Single frontal portable view of the chest was performed.    COMPARISON:  07/22/2022    FINDINGS:  Endotracheal tube tip at the level of the clavicles above the brea.  Right internal jugular central venous catheter with tip projected over the superior cavoatrial junction.Enteric tube courses below the diaphragm.  Cardiomediastinal silhouette appears unchanged.  Patchy airspace opacities in the right lung in at the left lung base appear improved in comparison to the prior study on 07/22/2022.  No pneumothorax or significant pleural effusion.  Impression: Patchy, right greater than left, airspace disease, improved since the prior study.  Support devices in stable position.    Electronically signed by: Raymundo Min  Date:    07/23/2022  Time:    10:11      CURRENT VISIT EKG  Results for orders placed or performed during the hospital encounter of 07/22/22   EKG 12-lead    Narrative    Test Reason : R10.9,    Vent. Rate : 092 BPM     Atrial Rate : 092 BPM     P-R Int : 166 ms          QRS Dur : 076 ms      QT Int : 368 ms       P-R-T Axes : 042 -16 034 degrees     QTc Int : 455 ms    Normal sinus rhythm  Possible Anterior infarct (cited on or before 13-DEC-2020)  Abnormal ECG  When compared with ECG of 07-JUL-2022 15:15,  No significant change was found    Referred By:  ED MD           Confirmed By:        ECHOCARDIOGRAM RESULTS  Results for orders placed during the hospital encounter of 07/07/22    Echo    Interpretation Summary  · The left ventricle is normal in size with moderate concentric hypertrophy and low normal systolic function.  · The estimated ejection fraction is 50%.  · Normal left ventricular diastolic function.  · Normal right ventricular size with normal right ventricular systolic function.  · Mild left atrial enlargement.  · Mild right atrial enlargement.  · Intermediate central venous pressure (8 mmHg).  · Moderate circumferential pericardial  effusion.        ASSESSMENT/PLAN:     Active Hospital Problems    Diagnosis  POA    *Acute hypoxemic respiratory failure [J96.01]  Yes    HCAP (healthcare-associated pneumonia), RLL [J18.9]  Yes    ESRD (end stage renal disease) [N18.6]  Yes    Hyperglycemia without ketosis [R73.9]  Yes    ARDS (adult respiratory distress syndrome) [J80]  Yes    Anemia [D64.9]  Yes    Seizure [R56.9]  Yes    Gastroparesis [K31.84]  Yes    Type II diabetes mellitus [E11.9]  Yes      Resolved Hospital Problems   No resolved problems to display.     Pulmonary edema, this does not appear to be a pneumonia  More anemic today despite dialysis  Sickle cell trait    Hopefully the patient will dialyze again today and we can get her extubated  Decrease FiO2 to .4 and PEEP to 5  Add fentanyl for adequate sedation on the ventilator  As dialysis is not begun, and it is afternoon, will attempt spontaneous breathing trial in the morning with hopes to extubate  Repeat hemoglobin this afternoon    Critical care time spent reviewing the chart, examining the patient, reviewing the labs, reviewing the radiological findings, discussing care with nursing, physicians, and respiratory and creating the note and  has been greater than 35 minutes  Iveth Berman MD  Former[facility], [facility]  Department of Pulmonology  Date of Service: 07/23/2022  1:18 PM

## 2022-07-23 NOTE — PT/OT/SLP PROGRESS
Physical Therapy      Patient Name:  Tabby Howard   MRN:  [id number]    Patient not seen today secondary to Transfer to ICU, await clearance, Other (Comment).SHAILA Mascorro Will follow-up 7/24/22.

## 2022-07-23 NOTE — RESPIRATORY THERAPY
ABG drawn and results given to Dr. Miller, at this time per Dr. Miller, no changes to vent management.    Latest Reference Range & Units 07/22/22 18:32   POC PH 7.35 - 7.45  7.353   POC PCO2 35 - 45 mmHg 41.1   POC PO2 80 - 100 mmHg 64 (L)   POC HCO3 24 - 28 mmol/L 22.8 (L)   POC SATURATED O2 95 - 100 % 91 (L)   POC BE -2 to 2 mmol/L -3   POC TCO2 23 - 27 mmol/L 24   FiO2  100   Vt  500   PiP  38   PEEP  18   (L): Data is abnormally low

## 2022-07-23 NOTE — CARE UPDATE
07/23/22 0741   Preset Conventional Ventilator Settings   Vent Type    Ventilation Type VC   Vent Mode A/C   Humidity HME   Set Rate 14 BPM   Vt Set 450 mL   PEEP/CPAP 10 cmH20   Pressure Support 0 cmH20   Waveform RAMP   Peak Flow 70 L/min   FIO2 50%

## 2022-07-24 LAB
ALBUMIN SERPL BCP-MCNC: 1.6 G/DL (ref 3.5–5.2)
ALLENS TEST: ABNORMAL
ALP SERPL-CCNC: 70 U/L (ref 55–135)
ALT SERPL W/O P-5'-P-CCNC: 19 U/L (ref 10–44)
ANION GAP SERPL CALC-SCNC: 11 MMOL/L (ref 8–16)
AST SERPL-CCNC: 14 U/L (ref 10–40)
BILIRUB SERPL-MCNC: 2.1 MG/DL (ref 0.1–1)
BUN SERPL-MCNC: 25 MG/DL (ref 6–20)
CALCIUM SERPL-MCNC: 7.4 MG/DL (ref 8.7–10.5)
CHLORIDE SERPL-SCNC: 104 MMOL/L (ref 95–110)
CO2 SERPL-SCNC: 25 MMOL/L (ref 23–29)
CREAT SERPL-MCNC: 2.6 MG/DL (ref 0.5–1.4)
DELSYS: ABNORMAL
ERYTHROCYTE [DISTWIDTH] IN BLOOD BY AUTOMATED COUNT: 16.3 % (ref 11.5–14.5)
ERYTHROCYTE [SEDIMENTATION RATE] IN BLOOD BY WESTERGREN METHOD: 10 MM/H
EST. GFR  (AFRICAN AMERICAN): 27 ML/MIN/1.73 M^2
EST. GFR  (NON AFRICAN AMERICAN): 23 ML/MIN/1.73 M^2
ETCO2: 20
FIO2: 40
GLUCOSE SERPL-MCNC: 148 MG/DL (ref 70–110)
HCO3 UR-SCNC: 27.8 MMOL/L (ref 24–28)
HCT VFR BLD AUTO: 21.4 % (ref 37–48.5)
HGB BLD-MCNC: 7.8 G/DL (ref 12–16)
MAGNESIUM SERPL-MCNC: 1.6 MG/DL (ref 1.6–2.6)
MCH RBC QN AUTO: 29.9 PG (ref 27–31)
MCHC RBC AUTO-ENTMCNC: 36.4 G/DL (ref 32–36)
MCV RBC AUTO: 82 FL (ref 82–98)
MIN VOL: 11.1
MODE: ABNORMAL
PCO2 BLDA: 26 MMHG (ref 35–45)
PEEP: 5
PH SMN: 7.64 [PH] (ref 7.35–7.45)
PHOSPHATE SERPL-MCNC: 2.9 MG/DL (ref 2.7–4.5)
PIP: 22
PLATELET # BLD AUTO: 135 K/UL (ref 150–450)
PMV BLD AUTO: 9.4 FL (ref 9.2–12.9)
PO2 BLDA: 123 MMHG (ref 80–100)
POC BE: 7 MMOL/L
POC SATURATED O2: 99 % (ref 95–100)
POC TCO2: 29 MMOL/L (ref 23–27)
POCT GLUCOSE: 180 MG/DL (ref 70–110)
POCT GLUCOSE: 186 MG/DL (ref 70–110)
POCT GLUCOSE: 189 MG/DL (ref 70–110)
POCT GLUCOSE: 190 MG/DL (ref 70–110)
POCT GLUCOSE: 211 MG/DL (ref 70–110)
POTASSIUM SERPL-SCNC: 3.2 MMOL/L (ref 3.5–5.1)
PROT SERPL-MCNC: 4.2 G/DL (ref 6–8.4)
RBC # BLD AUTO: 2.61 M/UL (ref 4–5.4)
SAMPLE: ABNORMAL
SITE: ABNORMAL
SODIUM SERPL-SCNC: 140 MMOL/L (ref 136–145)
SP02: 100
VANCOMYCIN SERPL-MCNC: 13.9 UG/ML
VT: 450
WBC # BLD AUTO: 11.35 K/UL (ref 3.9–12.7)

## 2022-07-24 PROCEDURE — 63600175 PHARM REV CODE 636 W HCPCS: Performed by: INTERNAL MEDICINE

## 2022-07-24 PROCEDURE — C9113 INJ PANTOPRAZOLE SODIUM, VIA: HCPCS | Performed by: INTERNAL MEDICINE

## 2022-07-24 PROCEDURE — 82803 BLOOD GASES ANY COMBINATION: CPT

## 2022-07-24 PROCEDURE — 99233 PR SUBSEQUENT HOSPITAL CARE,LEVL III: ICD-10-PCS | Mod: S$GLB,,, | Performed by: INTERNAL MEDICINE

## 2022-07-24 PROCEDURE — 94761 N-INVAS EAR/PLS OXIMETRY MLT: CPT

## 2022-07-24 PROCEDURE — 94799 UNLISTED PULMONARY SVC/PX: CPT

## 2022-07-24 PROCEDURE — 92610 EVALUATE SWALLOWING FUNCTION: CPT

## 2022-07-24 PROCEDURE — 27000221 HC OXYGEN, UP TO 24 HOURS

## 2022-07-24 PROCEDURE — 84100 ASSAY OF PHOSPHORUS: CPT | Performed by: INTERNAL MEDICINE

## 2022-07-24 PROCEDURE — 99900035 HC TECH TIME PER 15 MIN (STAT)

## 2022-07-24 PROCEDURE — 99233 SBSQ HOSP IP/OBS HIGH 50: CPT | Mod: S$GLB,,, | Performed by: INTERNAL MEDICINE

## 2022-07-24 PROCEDURE — C9399 UNCLASSIFIED DRUGS OR BIOLOG: HCPCS | Performed by: INTERNAL MEDICINE

## 2022-07-24 PROCEDURE — 97161 PT EVAL LOW COMPLEX 20 MIN: CPT

## 2022-07-24 PROCEDURE — 85027 COMPLETE CBC AUTOMATED: CPT | Performed by: INTERNAL MEDICINE

## 2022-07-24 PROCEDURE — 83735 ASSAY OF MAGNESIUM: CPT | Performed by: INTERNAL MEDICINE

## 2022-07-24 PROCEDURE — 27200966 HC CLOSED SUCTION SYSTEM

## 2022-07-24 PROCEDURE — 25000003 PHARM REV CODE 250: Performed by: INTERNAL MEDICINE

## 2022-07-24 PROCEDURE — 80202 ASSAY OF VANCOMYCIN: CPT | Performed by: INTERNAL MEDICINE

## 2022-07-24 PROCEDURE — 97116 GAIT TRAINING THERAPY: CPT

## 2022-07-24 PROCEDURE — A4216 STERILE WATER/SALINE, 10 ML: HCPCS | Performed by: INTERNAL MEDICINE

## 2022-07-24 PROCEDURE — 80053 COMPREHEN METABOLIC PANEL: CPT | Performed by: INTERNAL MEDICINE

## 2022-07-24 PROCEDURE — 20000000 HC ICU ROOM

## 2022-07-24 PROCEDURE — 37799 UNLISTED PX VASCULAR SURGERY: CPT

## 2022-07-24 RX ORDER — POTASSIUM CHLORIDE 14.9 MG/ML
20 INJECTION INTRAVENOUS ONCE
Status: DISCONTINUED | OUTPATIENT
Start: 2022-07-24 | End: 2022-07-24

## 2022-07-24 RX ORDER — LABETALOL HYDROCHLORIDE 5 MG/ML
10 INJECTION, SOLUTION INTRAVENOUS ONCE
Status: COMPLETED | OUTPATIENT
Start: 2022-07-24 | End: 2022-07-24

## 2022-07-24 RX ORDER — MAGNESIUM SULFATE HEPTAHYDRATE 40 MG/ML
2 INJECTION, SOLUTION INTRAVENOUS ONCE
Status: COMPLETED | OUTPATIENT
Start: 2022-07-24 | End: 2022-07-24

## 2022-07-24 RX ORDER — LEVETIRACETAM 500 MG/5ML
500 INJECTION, SOLUTION, CONCENTRATE INTRAVENOUS EVERY 12 HOURS
Status: DISCONTINUED | OUTPATIENT
Start: 2022-07-24 | End: 2022-07-27 | Stop reason: HOSPADM

## 2022-07-24 RX ORDER — DEXMEDETOMIDINE HYDROCHLORIDE 4 UG/ML
0-1.4 INJECTION INTRAVENOUS CONTINUOUS
Status: DISCONTINUED | OUTPATIENT
Start: 2022-07-24 | End: 2022-07-24

## 2022-07-24 RX ORDER — HYDRALAZINE HYDROCHLORIDE 20 MG/ML
10 INJECTION INTRAMUSCULAR; INTRAVENOUS EVERY 4 HOURS PRN
Status: DISCONTINUED | OUTPATIENT
Start: 2022-07-24 | End: 2022-07-27 | Stop reason: HOSPADM

## 2022-07-24 RX ORDER — PANTOPRAZOLE SODIUM 40 MG/10ML
40 INJECTION, POWDER, LYOPHILIZED, FOR SOLUTION INTRAVENOUS DAILY
Status: DISCONTINUED | OUTPATIENT
Start: 2022-07-24 | End: 2022-07-27 | Stop reason: HOSPADM

## 2022-07-24 RX ORDER — ENOXAPARIN SODIUM 100 MG/ML
1 INJECTION SUBCUTANEOUS EVERY 24 HOURS
Status: DISCONTINUED | OUTPATIENT
Start: 2022-07-24 | End: 2022-07-27 | Stop reason: HOSPADM

## 2022-07-24 RX ORDER — ENOXAPARIN SODIUM 100 MG/ML
1 INJECTION SUBCUTANEOUS EVERY 24 HOURS
Status: DISCONTINUED | OUTPATIENT
Start: 2022-07-24 | End: 2022-07-24

## 2022-07-24 RX ORDER — CLONIDINE 0.2 MG/24H
1 PATCH, EXTENDED RELEASE TRANSDERMAL
Status: DISCONTINUED | OUTPATIENT
Start: 2022-07-24 | End: 2022-07-27 | Stop reason: HOSPADM

## 2022-07-24 RX ORDER — POTASSIUM CHLORIDE 7.45 MG/ML
10 INJECTION INTRAVENOUS
Status: COMPLETED | OUTPATIENT
Start: 2022-07-24 | End: 2022-07-24

## 2022-07-24 RX ADMIN — PANTOPRAZOLE SODIUM 40 MG: 40 INJECTION, POWDER, FOR SOLUTION INTRAVENOUS at 11:07

## 2022-07-24 RX ADMIN — MUPIROCIN: 20 OINTMENT TOPICAL at 09:07

## 2022-07-24 RX ADMIN — INSULIN ASPART 2 UNITS: 100 INJECTION, SOLUTION INTRAVENOUS; SUBCUTANEOUS at 06:07

## 2022-07-24 RX ADMIN — Medication 10 ML: at 08:07

## 2022-07-24 RX ADMIN — HYDRALAZINE HYDROCHLORIDE 10 MG: 20 INJECTION INTRAMUSCULAR; INTRAVENOUS at 04:07

## 2022-07-24 RX ADMIN — MUPIROCIN: 20 OINTMENT TOPICAL at 08:07

## 2022-07-24 RX ADMIN — LABETALOL HYDROCHLORIDE 10 MG: 5 INJECTION INTRAVENOUS at 05:07

## 2022-07-24 RX ADMIN — PROPOFOL 50 MCG/KG/MIN: 10 INJECTION, EMULSION INTRAVENOUS at 12:07

## 2022-07-24 RX ADMIN — DEXMEDETOMIDINE HYDROCHLORIDE 0.2 MCG/KG/HR: 4 INJECTION INTRAVENOUS at 05:07

## 2022-07-24 RX ADMIN — PROPOFOL 50 MCG/KG/MIN: 10 INJECTION, EMULSION INTRAVENOUS at 04:07

## 2022-07-24 RX ADMIN — POTASSIUM CHLORIDE 10 MEQ: 7.46 INJECTION, SOLUTION INTRAVENOUS at 12:07

## 2022-07-24 RX ADMIN — HEPARIN SODIUM 5000 UNITS: 5000 INJECTION INTRAVENOUS; SUBCUTANEOUS at 05:07

## 2022-07-24 RX ADMIN — POTASSIUM CHLORIDE 10 MEQ: 7.46 INJECTION, SOLUTION INTRAVENOUS at 11:07

## 2022-07-24 RX ADMIN — MIDAZOLAM 4 MG: 1 INJECTION INTRAMUSCULAR; INTRAVENOUS at 04:07

## 2022-07-24 RX ADMIN — ENOXAPARIN SODIUM 80 MG: 80 INJECTION SUBCUTANEOUS at 02:07

## 2022-07-24 RX ADMIN — Medication 10 ML: at 05:07

## 2022-07-24 RX ADMIN — SODIUM CHLORIDE: 0.9 INJECTION, SOLUTION INTRAVENOUS at 06:07

## 2022-07-24 RX ADMIN — Medication 10 ML: at 02:07

## 2022-07-24 RX ADMIN — CLONIDINE 1 PATCH: 0.2 PATCH TRANSDERMAL at 06:07

## 2022-07-24 RX ADMIN — HYDRALAZINE HYDROCHLORIDE 10 MG: 20 INJECTION INTRAMUSCULAR; INTRAVENOUS at 08:07

## 2022-07-24 RX ADMIN — LEVETIRACETAM 500 MG: 100 INJECTION, SOLUTION INTRAVENOUS at 08:07

## 2022-07-24 RX ADMIN — LEVETIRACETAM 500 MG: 100 INJECTION, SOLUTION INTRAVENOUS at 11:07

## 2022-07-24 RX ADMIN — MAGNESIUM SULFATE 2 G: 2 INJECTION INTRAVENOUS at 11:07

## 2022-07-24 NOTE — PLAN OF CARE
Remains in ICU intubated and sedated.  Currently on propofol at 50mcg, fentanyl at 5-mcg, and had a prn dose of versed 4mg IVP at about 8 pm last night for severe restlessness.  moderate amount of pink-tinged sputum.  Dialysis yesterday evening--1 1/2 liters fluid off.  1 unit of PRBC's given slowly over 4 hours.  No acute resp distress noted.    OG replaced as it came out when turning pt.  Bladder scan at midnight revealing only 24 ml of urine.  CPAP trial this am.  Pt awake and extremely agitated, attempting to pull out ETT, appears to be fighting, blood pressure elevating and RR35.    ABG's done and called to Dr. Berman per RT.    Pt placed on precedex.  Propofol and fentanyl stopped.  Pt still agitated and fighting when awake making it difficult to do breathing trail.  Spoke with Dr. Berman.  Orders to extubate given.  Extubated at 05:30 to nasal cannula at 3 liters.  Tolerated well.  Restraints discontinued.

## 2022-07-24 NOTE — PROGRESS NOTES
Progress Note  Pulmonary/Critical Care      PATIENT NAME: Tabby Howard  MRN: [id number]  TODAY'S DATE: 2022  1:18 PM  ADMIT DATE: 2022  AGE: 32 y.o. : 1990    HPI/INTERVAL HISTORY (See H&P for complete P,F,SHx)   The patient is a 32-year-old female with end-stage renal disease who presented in pulmonary edema.  She had gone to dialysis but had some bleeding from her HemoSplit.  That HemoSplit has been removed and she now has a Trialysis catheter in the right IJ.  she was dialyzed last night and her ventilator requirements have come down dramatically.  She does still have some pulmonary edema on chest x-ray.     the patient was extubated this morning.  She was alkalotic on assist control and crazed on CPAP and therefore was just extubated.  Unfortunately, her Precedex was not stopped and she remains lethargic this morning.  She has significant edema to her left arm.  She had a recently removed HemoSplit from her left IJ position.    REVIEW OF SYSTEMS  Unobtainable    VITAL SIGNS (MOST RECENT)  Temp: 98.78 °F (37.1 °C) (22 1030)  Pulse: 74 (22 1030)  Resp: (!) 22 (22 1030)  BP: (!) 143/82 (22 1030)  SpO2: 96 % (22 1030)    INTAKE AND OUTPUT (LAST 24 HOURS):    Intake/Output Summary (Last 24 hours) at 2022 1113  Last data filed at 2022 1000  Gross per 24 hour   Intake 1492.2 ml   Output 2850 ml   Net -1357.8 ml       WEIGHT  Wt Readings from Last 1 Encounters:   22 75.2 kg (165 lb 12.6 oz)       PHYSICAL EXAM  GENERAL:  Mid aged patient in no distress, still lethargic  HEENT: Pupils equal and reactive. Extraocular movements intact. Nose intact. Pharynx moist  NECK: Supple.  Right Trialysis catheter  HEART: Regular rate and rhythm. No murmur or gallop auscultated.  LUNGS:  There are a few bibasilar crackles Lung excursion symmetrical. No change in fremitus.   ABDOMEN: Bowel sounds present. Non-tender, no masses palpated.  : Normal  anatomy.  EXTREMITIES:  The left arm is edematous throughout to the fingers.  There is a right arterial line  LYMPHATICS: No adenopathy palpated, no edema.  SKIN: Dry, intact, no lesions.   NEURO:  Lethargic but following commands  PSYCH:  Lethargic    VENTILATOR SETTINGS  Vent Mode: Spont  Oxygen Concentration (%):  [28-50] 32  Resp Rate Total:  [17 br/min-41 br/min] 21 br/min  Vt Set:  [450 mL] 450 mL  PEEP/CPAP:  [5 cmH20-10 cmH20] 5 cmH20  Pressure Support:  [0 cmH20-10 cmH20] 5 cmH20  Mean Airway Pressure:  [8.4 cmH20-15 cmH20] 8.4 cmH20       LAST ARTERIAL BLOOD GAS  ABG  Recent Labs   Lab 07/24/22  0445   PH 7.637*   PO2 123*   PCO2 26.0*   HCO3 27.8   BE 7         CBC LAST (LAST 24 HOURS)  Recent Labs   Lab 07/24/22  0438   WBC 11.35   RBC 2.61*   HGB 7.8*   HCT 21.4*   MCV 82   MCH 29.9   MCHC 36.4*   RDW 16.3*   *   MPV 9.4       CHEMISTRY LAST (LAST 24 HOURS)  Recent Labs   Lab 07/24/22 0438 07/24/22  0445     --    K 3.2*  --      --    CO2 25  --    ANIONGAP 11  --    BUN 25*  --    CREATININE 2.6*  --    *  --    CALCIUM 7.4*  --    PH  --  7.637*   MG 1.6  --    ALBUMIN 1.6*  --    PROT 4.2*  --    ALKPHOS 70  --    ALT 19  --    AST 14  --    BILITOT 2.1*  --          LAST 7 DAYS MICROBIOLOGY   Microbiology Results (last 7 days)     Procedure Component Value Units Date/Time    Culture, Respiratory with Gram Stain [[id number]] Collected: 07/22/22 1550    Order Status: Completed Specimen: Sputum, Expectorated Updated: 07/24/22 0636     Respiratory Culture No Growth     Gram Stain (Respiratory) <10 epithelial cells per low power field.     Gram Stain (Respiratory) Rare WBC's     Gram Stain (Respiratory) No organisms seen    Blood culture x two cultures. Draw prior to antibiotics. [[id number]] Collected: 07/22/22 1112    Order Status: Completed Specimen: Blood from Antecubital, Right Arm Updated: 07/24/22 0614     Blood Culture, Routine No Growth to date      No Growth to date     Narrative:      Aerobic and anaerobic    Blood culture x two cultures. Draw prior to antibiotics. [[id number]] Collected: 07/22/22 1132    Order Status: Completed Specimen: Blood from Antecubital, Right Arm Updated: 07/24/22 0614     Blood Culture, Routine No Growth to date      No Growth to date    Narrative:      Aerobic and anaerobic          MOST RECENT IMAGING  Chest x-ray 7/24 shows continued clearance of pulmonary edema.    CURRENT VISIT EKG  Results for orders placed or performed during the hospital encounter of 07/22/22   EKG 12-lead    Narrative    Test Reason : R10.9,    Vent. Rate : 092 BPM     Atrial Rate : 092 BPM     P-R Int : 166 ms          QRS Dur : 076 ms      QT Int : 368 ms       P-R-T Axes : 042 -16 034 degrees     QTc Int : 455 ms    Normal sinus rhythm  Possible Anterior infarct (cited on or before 13-DEC-2020)  Abnormal ECG  When compared with ECG of 07-JUL-2022 15:15,  No significant change was found    Referred By:  ED MD           Confirmed By:        ECHOCARDIOGRAM RESULTS  Results for orders placed during the hospital encounter of 07/07/22    Echo    Interpretation Summary  · The left ventricle is normal in size with moderate concentric hypertrophy and low normal systolic function.  · The estimated ejection fraction is 50%.  · Normal left ventricular diastolic function.  · Normal right ventricular size with normal right ventricular systolic function.  · Mild left atrial enlargement.  · Mild right atrial enlargement.  · Intermediate central venous pressure (8 mmHg).  · Moderate circumferential pericardial effusion.        ASSESSMENT/PLAN:     Active Hospital Problems    Diagnosis  POA    *Acute hypoxemic respiratory failure [J96.01]  Yes    HCAP (healthcare-associated pneumonia), RLL [J18.9]  Yes    ESRD (end stage renal disease) [N18.6]  Yes    Hyperglycemia without ketosis [R73.9]  Yes    ARDS (adult respiratory distress syndrome) [J80]  Yes    Anemia [D64.9]  Yes    Seizure  [R56.9]  Yes    Gastroparesis [K31.84]  Yes    Type II diabetes mellitus [E11.9]  Yes      Resolved Hospital Problems   No resolved problems to display.     Pulmonary edema, improving  Extubated  Edema to the left arm in its entirety  Anemia, improved  Sickle cell trait    Discontinue arterial line  Ultrasound left great vessels  Out of bed  Remove Precedex from CHRIS Berman MD  [facility]  Department of Pulmonology  Date of Service: 07/24/2022  1:18 PM

## 2022-07-24 NOTE — CARE UPDATE
CPAP attempted. Pt became very agitated,sitting up in bed and thrashing around, attempting to pull at ET tube. RR 30-40s, with Vt ranging from 145-2250. Attempts to calm patient from RN and RT, Pt uncooperative.  Pt was placed back on original settings and sedation restarted. ABG to follow.     07/24/22 0351 07/24/22 0352 07/24/22 0353   Preset Conventional Ventilator Settings   Ventilation Type VC VC VC   Vent Mode Spont Spont Spont   Set Rate 0 BPM 0 BPM 0 BPM   Vt Set 450 mL 450 mL 450 mL   PEEP/CPAP 5 cmH20 5 cmH20 5 cmH20   Pressure Support 10 cmH20 10 cmH20 10 cmH20   Waveform RAMP RAMP RAMP   Peak Flow 70 L/min 70 L/min 70 L/min   Set Inspiratory Pressure 0 cmH20 0 cmH20 0 cmH20   Insp Time 0 Sec(s) 0 Sec(s) 0 Sec(s)   Plateau Set/Insp. Hold (sec) 0 0 0   Insp Rise Time  50 % 50 % 50 %   Trigger Sensitivity Flow/I-Trigger 0.9 L/min 0.9 L/min 0.9 L/min   P High 0 cm H2O 0 cm H2O 0 cm H2O   P Low 0 cm H2O 0 cm H2O 0 cm H2O   T High 0 sec 0 sec 0 sec   T Low 0 sec 0 sec 0 sec   Patient Ventilator Parameters   Resp Rate Total 28 br/min 32 br/min 36 br/min   Peak Airway Pressure 17 cmH2O 15 cmH2O 17 cmH2O   Mean Airway Pressure 14 cmH20 11 cmH20 9.4 cmH20   Plateau Pressure 0 cmH20 0 cmH20 0 cmH20   Exhaled Vt 259 mL 2105 mL 1357 mL   Total Ve 12.8 mL 22.4 mL 17 mL   Spont Ve 12.8 L 22.4 L 17 L   I:E Ratio Measured 1.10:1 1:23.0 1:2.50      07/24/22 0354 07/24/22 0355   Preset Conventional Ventilator Settings   Ventilation Type VC VC   Vent Mode Spont Spont   Set Rate 0 BPM 0 BPM   Vt Set 450 mL 450 mL   PEEP/CPAP 5 cmH20 5 cmH20   Pressure Support 10 cmH20 10 cmH20   Waveform RAMP RAMP   Peak Flow 70 L/min 70 L/min   Set Inspiratory Pressure 0 cmH20 0 cmH20   Insp Time 0 Sec(s) 0 Sec(s)   Plateau Set/Insp. Hold (sec) 0 0   Insp Rise Time  50 % 50 %   Trigger Sensitivity Flow/I-Trigger 0.9 L/min 0.9 L/min   P High 0 cm H2O 0 cm H2O   P Low 0 cm H2O 0 cm H2O   T High 0 sec 0 sec   T Low 0 sec 0 sec   Patient  Ventilator Parameters   Resp Rate Total 36 br/min 41 br/min   Peak Airway Pressure 19 cmH2O 15 cmH2O   Mean Airway Pressure 10 cmH20 9.8 cmH20   Plateau Pressure 0 cmH20 0 cmH20   Exhaled Vt 149 mL 294 mL   Total Ve 16.6 mL 20.6 mL   Spont Ve 16.6 L 20.6 L   I:E Ratio Measured 1.80:1 1.10:1

## 2022-07-24 NOTE — CARE UPDATE
07/23/22 1915   Patient Assessment/Suction   Level of Consciousness (AVPU) responds to voice   Respiratory Effort Normal;Unlabored   Expansion/Accessory Muscles/Retractions no use of accessory muscles   All Lung Fields Breath Sounds Anterior:;diminished   BRYAN Breath Sounds diminished   LLL Breath Sounds diminished   RUL Breath Sounds diminished   RML Breath Sounds diminished   RLL Breath Sounds diminished   Rhythm/Pattern, Respiratory assisted mechanically   Cough Frequency infrequent;with stimulation  (gagging)   Cough Type productive   Suction Method tracheal   $ Suction Charges Inline Suction Procedure Stat Charge   Secretions Amount small   Secretions Color pink   Secretions Characteristics thick   Skin Integrity   $ Wound Care Tech Time 15 min   Area Observed Cheek;Upper lip;Lower lip   Skin Appearance without discoloration   PRE-TX-O2   O2 Device (Oxygen Therapy) ventilator   $ Is the patient on Low Flow Oxygen? Yes   Oxygen Concentration (%) 40   SpO2 100 %   Pulse Oximetry Type Continuous   $ Pulse Oximetry - Multiple Charge Pulse Oximetry - Multiple   Pulse 68   Resp 19   Temp 96.62 °F (35.9 °C)   ETCO2   $ ETCO2 Usage Currently wearing   ETCO2 (mmHg) 27 mmHg   ETCO2 Device Type Bedside Monitor;Ventilator;Artificial Airway      Airway Anesthesia 07/22/22   Placement Date/Time: 07/22/22 (c) 1500   Method of Intubation: Video Laryngoscopy  Airway Device: Endotracheal Tube  Mask Ventilation: Mask ventilation not attempted  Intubated: Postinduction  Blade: Byrd #3  Airway Device Size: 7.5  Cuff Inflation...   Secured at 24 cm   Measured At Lips   Secured Location Center   Secured by Commercial tube ortiz   Bite Block center   Site Condition Cool;Dry   Status Intact;Secured;Patent   Site Assessment Clean;Dry   Cuff Pressure 27 cm H2O   Airway Safety   Ambu bag with the patient? Yes, Adult Ambu   Is mask with the patient? Yes, Adult Mask   Suction set is at the bedside? Yes   Vent Select   Conventional  Vent Y   Charged w/in last 24h YES   Preset Conventional Ventilator Settings   Vent Type    Ventilation Type VC   Vent Mode A/C   Humidity HME   Set Rate 10 BPM   Vt Set 450 mL   PEEP/CPAP 10 cmH20   Pressure Support 0 cmH20   Waveform RAMP   Peak Flow 70 L/min   Set Inspiratory Pressure 0 cmH20   Insp Time 0 Sec(s)   Plateau Set/Insp. Hold (sec) 0   Insp Rise Time  0 %   Trigger Sensitivity Flow/I-Trigger 0.9 L/min   P High 0 cm H2O   P Low 0 cm H2O   T High 0 sec   T Low 0 sec   Patient Ventilator Parameters   Resp Rate Total 23 br/min   Peak Airway Pressure 24 cmH2O   Mean Airway Pressure 13 cmH20   Plateau Pressure 0 cmH20   Exhaled Vt 535 mL   Total Ve 13.5 mL   Spont Ve 0 L   I:E Ratio Measured 1:3.70   Total PEEP 11 cmH20   Tubing ID (mm) 7.5 mm   Tube Type ET   Conventional Ventilator Alarms   Alarms On Y   Ve High Alarm 16 L/min   Resp Rate High Alarm 40 br/min   Press High Alarm 40 cmH2O   Apnea Rate 10   Apnea Volume (mL) 450 mL   Apnea Oxygen Concentration  100   Apnea Flow Rate (L/min) 70   T Apnea 20 sec(s)   Ready to Wean/Extubation Screen   FIO2<=50 (chart decimal) 0.4   MV<16L (chart vol.) 13.5   PEEP <=8 (chart #) (!) 10   Ready to Wean Parameters   F/VT Ratio<105 (RSBI) (!) 35.51     CPAP trial in AM with ABG

## 2022-07-24 NOTE — ASSESSMENT & PLAN NOTE
Continue supplemental oxygen to maintain pulse ox above 92%.  Follow Pulmonary recommendations.   Continue beta 2 agonist bronchodilator treatments.   Awaiting swallow evaluation.  Continue IV antibiotics Levaquin and vancomycin.  Pharmacist to dose Vancomycin.  Patient is penicillin allergic.  Check sputum GS and Cx.   Continue routine medications as before.

## 2022-07-24 NOTE — PT/OT/SLP EVAL
Speech Language Pathology Evaluation  Bedside Swallow    Patient Name:  Tabby Howard   MRN:  [id number]  Admitting Diagnosis: Acute hypoxemic respiratory failure    Recommendations:     General Recommendations:   ongoing assessment of swallow    Diet recommendations:  npo except ice chips and occasional tsp water with nursing as tolerated with head of bed elevated until re-eval tomorrow    Aspiration Precautions:  nursing supervision for ice chips and occasional tsp water; Head of bed elevated close to 90 degrees    General Precautions: Standard, aspiration, fall      History:     32 year old female renal patient extubated this morning following acute respiratory failure.  Pt seen for bedside swallow assessment after checking with nursing.    Pt is alert and verbal at conversation level, visiting with family members.  Voice mildly dysphonic.    Pertinent hx includes gastroparesis.    Past Medical History:   Diagnosis Date    CKD (chronic kidney disease), stage IV 2022    Diabetes mellitus due to underlying condition with unspecified complications 2022    Gastroparesis 2022    Heart failure with preserved ejection fraction 2022    EF 55% on 3/22    History of gastroesophageal reflux (GERD)     History of supraventricular tachycardia     Hyperkalemia 2022    Hypertensive emergency 2022    Sickle cell trait 2022    Type 2 diabetes mellitus        Past Surgical History:   Procedure Laterality Date     SECTION      x 3    ESOPHAGOGASTRODUODENOSCOPY N/A 10/18/2019    Procedure: ESOPHAGOGASTRODUODENOSCOPY (EGD);  Surgeon: Gianluca Mendez MD;  Location: [facility];  Service: Endoscopy;  Laterality: N/A;    PLACEMENT OF DUAL-LUMEN VASCULAR CATHETER Left 2022    Procedure: INSERTION-CATHETER-JOSEPH;  Surgeon: Dionte Gan MD;  Location: [facility];  Service: General;  Laterality: Left;       Social History: deferred.    Prior Intubation HX:  Extubated today, intubated  approximately 7/22/22    Modified Barium Swallow: no study located in available epic records    Chest X-Rays:     MyChart Results Release        X-Ray Chest AP Portable  Order: [id number]   Status: Final result     Visible to patient: Yes (not seen)     Next appt: None     0 Result Notes    Details    Reading Physician Reading Date Result Priority   Raymundo Min MD  [phone] 7/24/2022 Routine     Narrative & Impression  EXAMINATION:  XR CHEST AP PORTABLE     CLINICAL HISTORY:  ogt placement;.     TECHNIQUE:  Single frontal portable view of the chest was performed.     COMPARISON:  07/23/2022     FINDINGS:  Endotracheal tube tip at the level of the clavicles above the brea.  Enteric tube courses below diaphragm.  Right internal jugular central venous catheter tip projects over the right atrium.Cardiomediastinal silhouette appears unchanged.  Patchy airspace opacification in the bilateral lower lungs suspicious for pneumonia or aspiration.  No significant pleural effusion or pneumothorax.  Bones are intact.     Impression:     No significant interval change in the radiographic appearance of the chest as compared to 07/23/2022.        Electronically signed by: Raymundo Min  Date:                                            07/24/2022  Time:                                           14:48             Exam Ended: 07/24/22 05:27 Last Resulted: 07/24/22 14:48                Prior diet: no reported restrictions other than renal.    Occupation/hobbies/homemaking: deferred.    Subjective     Pt cooperative. Denies pain except when she coughs.  Patient goals: no goals stated     Pain/Comfort:  · Pain Rating 1: 0/10    Respiratory Status: Nasal cannula, flow 3 L/min    Objective:     Oral Musculature Evaluation  · Oral Musculature: WFL  · Dentition: present and adequate  · Secretion Management: other (see comments) (not currently drooling or coughing on secretions)  · Mucosal Quality: adequate  · Mandibular Strength and  Mobility: WFL  · Oral Labial Strength and Mobility: WFL  · Lingual Strength and Mobility: WFL  · Velar Elevation: other (see comments) (limited view)  · Buccal Strength and Mobility: WFL  · Volitional Cough: productive; decreased intensity  · Volitional Swallow: upward laryngeal movement palpated  · Voice Prior to PO Intake: mildly breathy/hoarse but dry before and after ice and limited water trials    Bedside Swallow Eval:   Consistencies Assessed:  · Thin liquids (ice chips x 3 boli, tsp water x 3)   · Other items deferred due to signs of dysphagia    Oral Phase:   · Mild delay in initiation of mastication and oral transit  · Mildly extended mastication of ice chips  · No anterior loss  · Adequate lip closure on spoon    Pharyngeal Phase:   · ice -- no sign of aspiration   · Water tsp -- coughing after one of 3 tsp  · 2-3 swallows per bolus      Treatment: pt educated regarding reasons for visit, swallow safety issues, role of speech pathology    Assessment:     Tabby Howard is a 32 y.o. female with an SLP diagnosis of Dysphagia with aspiration risk.  She demonstrates mild oral dysphagia; pharyngeal dysphagia can't be ruled out at bedside.   Suspect temporary oral-pharyngeal dysphagia typical of patients on vent greater than 24 hrs.   Suspect pt needs a little more time post-extubation for recovery of safe swallow.  For now, suggest ice chips and occasional tsp water for comfort with nursing supervision as tolerated; reassessment by SLP tomorrow.  Results/recommendations shared with nursing.      Goals:   Multidisciplinary Problems     SLP Goals        Problem: SLP    Goal Priority Disciplines Outcome   SLP Goal     SLP Ongoing, Progressing   Description: 1. Pt will tolerate ice chips and occasional single tsp water with nursing (Head of bed elevated) without sign of airway compromise x overnight (plan reassessment tomorrow).    2. Pt will participate in ongoing assessment of swallow.                   Plan:      · Patient to be seen:      · Plan of Care expires:     · Plan of Care reviewed with:  patient, other (see comments) (nursing)   · SLP Follow-Up:  Yes       Discharge recommendations:  other (see comments) (to be determined)   Barriers to Discharge:  nutrition    Time Tracking:     SLP Treatment Date:   07/24/22  Speech Start Time:  1549  Speech Stop Time:  1601     Speech Total Time (min):  12 min    Billable Minutes: Eval Swallow and Oral Function 12 07/24/2022

## 2022-07-24 NOTE — PLAN OF CARE
Problem: Physical Therapy  Goal: Physical Therapy Goal  Description: Goals to be met by: 8/15/2022     Patient will increase functional independence with mobility by performin). Supine to sit with Modified [place]  2). Sit to supine with Modified [place]  3). Sit to stand transfer with Modified [place]  4). Gait  x > 150 feet with Modified [place]    Outcome: Ongoing, Progressing

## 2022-07-24 NOTE — PROGRESS NOTES
1000: Patient failed swallow eval. Patient was given one small ice chip. She had a small cough. Was given a second bigger ice chip. Patient started to cough and choke. I asked her if she was choking and she shook her head yes. She had a strong cough and was able to clear her throat.   1300: patient passed second attempt of swallow eval. CLD renal/diabetic diet with 1200 ml FR ordered.   1345: Patient happy to get tray. Informed patient to watch her fluid intake and to drink slowly.

## 2022-07-24 NOTE — PROGRESS NOTES
INPATIENT NEPHROLOGY PROGRESS  [facility] NEPHROLOGY    Tabby Hoawrd  07/24/2022    Reason for consultation:    ESRD    Chief Complaint:   Chief Complaint   Patient presents with    Abdominal Pain     Patient presented to pre-op for dialysis cath changed and was in extreme abdominal pain and was brought to the er           History of Present Illness:    Pt is a 31 yo woman with h/o ESRD on hemodialysis, diabetes, hypertension, anemia,  Supraventricular tachycardia, heart failure with preserved ejection fraction, sickle cell trait, and secondary hyperparathyroidism who was referred to the emergency room from her outpatient dialysis unit.  She experienced bleeding from her hemosplit catheter sight while undergoing dialysis.  I called the outpatient unit and they stated that she starting bleeding and her shirt was covered in blood and there was a puddle on the floor.  The bleeding stopped when hemodialysis was discontinued.  She isn't very cooperative with questioning.  She states she has nausea, shortness of breath and weakness. She denies chest pain, fever, neurologic deficits, or urinary or bowel complaints.      7/23  Had HD yesterday once line replaced.  3L UF, pt was put on pressors as SBP dropped into the 50's almost immediately.  She is intubated and sedated.  Addendum:  D/w Dr. Benitez--d/c'd HD orders, he would like to see pt first as she has been so unstable  7/24  Extubated this am.  Had HD yesterday, 1.5L UF, got a unit of blood.  Hgb dropped yesterday afternoon to 6.2, now 7.8 post transfusion.  Hypokalemic at 3.2 this am, has prn repletion orders.  Off pressors, has low dose sedation.  VSS.        Plan of Care:       Assessment:    ESRD  --PRN HD schedule for now  --fluid restrict  --renal dose medication     hyperglycemia  --per primary    Anemia due to acute blood loss and esrd  --heparin free hd  --epogen with hd  --blood products at discretion of primary    Pneumonia  --cefepime and Vancomycin  ordered  --oxygen supplementation as necessary    Hypertension  --fluid restrict  --uf with hd  --low salt diet    Hyponatremia  --improved after HD  --fluid restict when taking PO  --uf with hd    Metabolic acidosis  --35 bicarb bath      Thank you for allowing us to participate in this patient's care. We will continue to follow.    Vital Signs:  Temp Readings from Last 3 Encounters:   22 98.42 °F (36.9 °C)   22 98.7 °F (37.1 °C) (Temporal)   22 98 °F (36.7 °C)       Pulse Readings from Last 3 Encounters:   22 75   22 91   22 96       BP Readings from Last 3 Encounters:   22 138/83   22 (!) 153/95   22 (!) 160/99       Weight:  Wt Readings from Last 3 Encounters:   22 75.2 kg (165 lb 12.6 oz)   22 74.8 kg (165 lb)   22 76.1 kg (167 lb 12.3 oz)       Past Medical & Surgical History:  Past Medical History:   Diagnosis Date    CKD (chronic kidney disease), stage IV 2022    Diabetes mellitus due to underlying condition with unspecified complications 2022    Gastroparesis 2022    Heart failure with preserved ejection fraction 2022    EF 55% on 3/22    History of gastroesophageal reflux (GERD)     History of supraventricular tachycardia     Hyperkalemia 2022    Hypertensive emergency 2022    Sickle cell trait 2022    Type 2 diabetes mellitus        Past Surgical History:   Procedure Laterality Date     SECTION      x 3    ESOPHAGOGASTRODUODENOSCOPY N/A 10/18/2019    Procedure: ESOPHAGOGASTRODUODENOSCOPY (EGD);  Surgeon: Gianluca Mendez MD;  Location: [facility];  Service: Endoscopy;  Laterality: N/A;    PLACEMENT OF DUAL-LUMEN VASCULAR CATHETER Left 2022    Procedure: INSERTION-CATHETER-JOSEPH;  Surgeon: Dionte Gan MD;  Location: [facility];  Service: General;  Laterality: Left;       Past Social History:  Social History     Socioeconomic History    Marital status:    Tobacco Use     Smoking status: Never Smoker    Smokeless tobacco: Never Used   Substance and Sexual Activity    Alcohol use: No    Drug use: No    Sexual activity: Not Currently     Partners: Male     Birth control/protection: I.U.D.       Medications:  No current facility-administered medications on file prior to encounter.     Current Outpatient Medications on File Prior to Encounter   Medication Sig Dispense Refill    amLODIPine (NORVASC) 10 MG tablet Take 1 tablet (10 mg total) by mouth once daily. 30 tablet 11    atenoloL (TENORMIN) 50 MG tablet Take 1 tablet (50 mg total) by mouth every other day. 45 tablet 3    famotidine (PEPCID) 20 MG tablet Take 1 tablet (20 mg total) by mouth once daily. 30 tablet 0    FLUoxetine 20 MG capsule Take 1 capsule (20 mg total) by mouth once daily. 30 capsule 1    HYDROcodone-acetaminophen (NORCO) 5-325 mg per tablet Take 1 tablet by mouth every 6 (six) hours as needed for Pain. 20 tablet 0    insulin aspart U-100 (NOVOLOG) 100 unit/mL (3 mL) InPn pen Inject 1-10 Units into the skin before meals and at bedtime as needed (Hyperglycemia). 3 mL 3    isosorbide mononitrate (IMDUR) 60 MG 24 hr tablet Take 2 tablets (120 mg total) by mouth once daily. 30 tablet 1    LANTUS U-100 INSULIN 100 unit/mL injection SMARTSI Unit(s) SUB-Q Every Morning      levETIRAcetam (KEPPRA) 500 MG Tab Take 1 tablet (500 mg total) by mouth 2 (two) times daily. 60 tablet 1    polyethylene glycol (GLYCOLAX) 17 gram/dose powder Take 17 g by mouth 2 (two) times daily. 510 each 0    predniSONE (DELTASONE) 20 MG tablet Take 3 tablets (60 mg total) by mouth once daily for 4 days, THEN 2 tablets (40 mg total) once daily for 4 days, THEN 1 tablet (20 mg total) once daily for 4 days, THEN 0.5 tablets (10 mg total) once daily for 4 days. 26 tablet 0    [DISCONTINUED] furosemide (LASIX) 40 MG tablet Take 1 tablet (40 mg total) by mouth 2 (two) times daily. 60 tablet 0    [DISCONTINUED] pantoprazole  "(PROTONIX) 40 MG tablet Take 1 tablet (40 mg total) by mouth once daily. 30 tablet 3    [DISCONTINUED] torsemide (DEMADEX) 20 MG Tab Take 1 tablet (20 mg total) by mouth 2 (two) times a day. (Patient taking differently: Take 20 mg by mouth once daily.) 60 tablet 1     Scheduled Meds:   amLODIPine  10 mg Oral Daily    atenoloL  50 mg Oral Every other day    famotidine  20 mg Oral Daily    FLUoxetine  20 mg Oral Daily    folic acid  1 mg Oral Daily    heparin (porcine)  5,000 Units Subcutaneous Q8H    isosorbide mononitrate  120 mg Oral Daily    levETIRAcetam  500 mg Oral BID    levoFLOXacin  500 mg Intravenous Q48H    mupirocin   Nasal BID    pantoprazole  40 mg Oral Daily    polyethylene glycol  17 g Oral BID    sodium chloride 0.9%  10 mL Intravenous Q8H     Continuous Infusions:   sodium chloride 0.9% 50 mL/hr at 07/24/22 0631    dexmedetomidine (PRECEDEX) infusion 0.2 mcg/kg/hr (07/24/22 0655)     PRN Meds:.    Allergies:  Penicillins    Past Family History:  Reviewed; refer to Hospitalist Admission Note    Review of Systems:  Review of Systems - All 14 systems reviewed and negative, except as noted in HPI    Physical Exam:    /83   Pulse 75   Temp 98.42 °F (36.9 °C)   Resp 18   Ht 5' 2" (1.575 m)   Wt 75.2 kg (165 lb 12.6 oz)   SpO2 (!) 94%   Breastfeeding No   BMI 30.32 kg/m²     General Appearance:   ill appearing   Head:    Normocephalic, without obvious abnormality, atraumatic   Eyes:    PER, conjunctiva/corneas clear, EOM's intact in both eyes        Throat:   Lips, mucosa, and tongue normal; teeth and gums normal   Back:     Symmetric, no curvature, ROM normal, no CVA tenderness   Lungs:     Right sided rhonchi   Chest wall:    No tenderness or deformity   Heart:    Regular rate and rhythm, S1 and S2 normal, no murmur, rub   or gallop   Abdomen:     Tender.  No rigidity or guarding.    Extremities:   Extremities normal, atraumatic, no cyanosis or edema   Pulses:   2+ and " symmetric all extremities   MSK:   No joint or muscle swelling, tenderness or deformity   Skin:   Skin color, texture, turgor normal, no rashes or lesions   Neurologic:      No flap     Results:  Lab Results   Component Value Date     07/24/2022    K 3.2 (L) 07/24/2022     07/24/2022    CO2 25 07/24/2022    BUN 25 (H) 07/24/2022    CREATININE 2.6 (H) 07/24/2022    CALCIUM 7.4 (L) 07/24/2022    ANIONGAP 11 07/24/2022    ESTGFRAFRICA 27 (A) 07/24/2022    EGFRNONAA 23 (A) 07/24/2022       Lab Results   Component Value Date    CALCIUM 7.4 (L) 07/24/2022    PHOS 2.9 07/24/2022       Recent Labs   Lab 07/24/22  0438   WBC 11.35   RBC 2.61*   HGB 7.8*   HCT 21.4*   *   MCV 82   MCH 29.9   MCHC 36.4*          I have personally reviewed pertinent radiological imaging and reports.    Patient care was time spent personally by me on the following activities:   · Obtaining a history  · Examination of patient.  · Providing medical care at the patients bedside.  · Developing a treatment plan with patient or surrogate and bedside caregivers  · Ordering and reviewing laboratory studies, radiographic studies, pulse oximetry.  · Ordering and performing treatments and interventions.  · Evaluation of patient's response to treatment.  · Discussions with consultants while on the unit and immediately available to the patient.  · Re-evaluation of the patient's condition.  · Documentation in the medical record.       Geeta Kaur NP    Nephrology  [facility]  [phone]

## 2022-07-24 NOTE — PROGRESS NOTES
Tabby Howard [id number] is a 32 y.o. female who has been consulted for vancomycin dosing.    Pharmacy consult for vancomycin dosing in no longer required.  Vancomycin was discontinued.    Thank you for allowing us to participate in this patient's care.     Erick Lanza, PharmD  [phone]

## 2022-07-24 NOTE — SUBJECTIVE & OBJECTIVE
Interval History:  Earlier this morning patient got extubated.  Patient is currently on 3 liter/minute supplemental oxygen via nasal cannula.  Denies any significant body pains.  Overnight patient received 1 unit of packed red blood cells for symptomatic anemia.  Awaiting swallow evaluation.  Currently afebrile.      Review of Systems   Constitutional:  Positive for fatigue. Negative for chills and fever.   HENT:  Negative for congestion and sore throat.    Eyes:  Negative for visual disturbance.   Respiratory:  Positive for cough and shortness of breath.    Cardiovascular:  Negative for chest pain and palpitations.   Gastrointestinal:  Negative for abdominal pain, nausea and vomiting.   Endocrine: Negative for cold intolerance and heat intolerance.   Genitourinary:  Negative for dysuria and hematuria.   Musculoskeletal:  Positive for back pain. Negative for arthralgias and myalgias.   Skin:  Negative for pallor and rash.   Neurological:  Positive for weakness. Negative for tremors and seizures.   Hematological:  Negative for adenopathy. Does not bruise/bleed easily.   Psychiatric/Behavioral:  Negative for hallucinations.    Objective:     Vital Signs (Most Recent):  Temp: 98.6 °F (37 °C) (07/24/22 0800)  Pulse: 75 (07/24/22 0800)  Resp: 17 (07/24/22 0800)  BP: 136/85 (07/24/22 0800)  SpO2: (!) 94 % (07/24/22 0800)   Vital Signs (24h Range):  Temp:  [95.36 °F (35.2 °C)-98.6 °F (37 °C)] 98.6 °F (37 °C)  Pulse:  [64-92] 75  Resp:  [12-33] 17  SpO2:  [86 %-100 %] 94 %  BP: (103-184)/() 136/85  Arterial Line BP: ()/() 152/81     Weight: 75.2 kg (165 lb 12.6 oz)  Body mass index is 30.32 kg/m².    Intake/Output Summary (Last 24 hours) at 7/24/2022 0906  Last data filed at 7/24/2022 0631  Gross per 24 hour   Intake 1337.19 ml   Output 2850 ml   Net -1512.81 ml        Physical Exam  Constitutional:       Appearance: She is well-developed.   HENT:      Head: Normocephalic and atraumatic.   Eyes:       Conjunctiva/sclera: Conjunctivae normal.      Pupils: Pupils are equal, round, and reactive to light.   Neck:      Thyroid: No thyromegaly.      Vascular: No JVD.   Cardiovascular:      Rate and Rhythm: Normal rate and regular rhythm.      Heart sounds: No murmur heard.    No friction rub. No gallop.   Pulmonary:      Effort: Pulmonary effort is normal.      Breath sounds: Wheezing present.   Abdominal:      General: Bowel sounds are normal. There is no distension.      Palpations: Abdomen is soft. There is no mass.      Tenderness: There is no abdominal tenderness.   Musculoskeletal:         General: Normal range of motion.      Cervical back: Neck supple.   Skin:     General: Skin is warm and dry.   Neurological:      Mental Status: She is oriented to person, place, and time.      Cranial Nerves: No cranial nerve deficit.   Psychiatric:         Behavior: Behavior normal.       Significant Labs: All pertinent labs within the past 24 hours have been reviewed.  CBC:   Recent Labs   Lab 07/22/22  1008 07/23/22  0550 07/23/22  1654 07/24/22  0438   WBC 24.50* 27.07*  --  11.35   HGB 8.2* 7.1* 6.2* 7.8*   HCT 22.8* 19.2*  --  21.4*    172  --  135*       CMP:   Recent Labs   Lab 07/23/22  0319 07/23/22  0550 07/24/22  0438    139 140   K 3.9 3.7 3.2*    106 104   CO2 19* 21* 25   * 166* 148*   BUN 36* 37* 25*   CREATININE 3.2* 3.4* 2.6*   CALCIUM 7.9* 7.7* 7.4*   PROT  --  4.4* 4.2*   ALBUMIN  --  1.7* 1.6*   BILITOT  --  0.9 2.1*   ALKPHOS  --  72 70   AST  --  15 14   ALT  --  18 19   ANIONGAP 12 12 11   EGFRNONAA 18* 17* 23*       Coagulation: No results for input(s): PT, INR, APTT in the last 48 hours.  Lactic Acid:   Recent Labs   Lab 07/22/22  1358 07/22/22  1653 07/23/22  1047   LACTATE 1.1 2.3* 1.1       Troponin: No results for input(s): TROPONINI in the last 48 hours.  TSH:   Recent Labs   Lab 07/08/22  1517   TSH 1.866       Urine Studies: No results for input(s): JG,  APPEARANCEUA, PHUR, SPECGRAV, PROTEINUA, GLUCUA, KETONESU, BILIRUBINUA, OCCULTUA, NITRITE, UROBILINOGEN, LEUKOCYTESUR, RBCUA, WBCUA, BACTERIA, SQUAMEPITHEL, HYALINECASTS in the last 48 hours.    Invalid input(s): [facility]  Microbiology Results (last 7 days)       Procedure Component Value Units Date/Time    Culture, Respiratory with Gram Stain [[id number]] Collected: 07/22/22 1550    Order Status: Completed Specimen: Sputum, Expectorated Updated: 07/24/22 0636     Respiratory Culture No Growth     Gram Stain (Respiratory) <10 epithelial cells per low power field.     Gram Stain (Respiratory) Rare WBC's     Gram Stain (Respiratory) No organisms seen    Blood culture x two cultures. Draw prior to antibiotics. [[id number]] Collected: 07/22/22 1112    Order Status: Completed Specimen: Blood from Antecubital, Right Arm Updated: 07/24/22 0614     Blood Culture, Routine No Growth to date      No Growth to date    Narrative:      Aerobic and anaerobic    Blood culture x two cultures. Draw prior to antibiotics. [[id number]] Collected: 07/22/22 1132    Order Status: Completed Specimen: Blood from Antecubital, Right Arm Updated: 07/24/22 0614     Blood Culture, Routine No Growth to date      No Growth to date    Narrative:      Aerobic and anaerobic          Significant Imaging:   CXR: New right basilar opacification suggesting pneumonia.  Interval removal of the right-sided central venous catheter and the left-sided central venous catheter has partially retracted with the distal tip now at the level of the confluence of the left brachiocephalic vein and SVC    CXR: Endotracheal tube ends in the brea and should be pulled back a short distance.  Central line placed in good position without pneumothorax.  Right lower lobe infiltrate consistent with pneumonia.    CXR: NG tube has been inserted with the tip at the level of the body of the stomach.  Tip of the endotracheal tube projects approximately 1.7 cm above the brea.   Bilateral lung infiltrates with right lung infiltrate not significantly changed or if anything slightly more confluent and with now mild left perihilar infiltrate along with left basilar opacification suggesting atelectasis    CXR: Pending.

## 2022-07-24 NOTE — CARE UPDATE
Per Dr. Lacey orders, pt was extubated at 0532. Pt was placed on 3L NC. Sats 100%, RR 18   07/24/22 0532   Ready to Wean Parameters   $ Extubation Tech Time Tech Time 15 min   Spon. Breathing Trial Initiated? Initiated   Extubated? Yes   Reason for Extubation Extubated   Ventilator Discontinued Yes

## 2022-07-24 NOTE — PROGRESS NOTES
HD tx complete, net UF 1.5L removed.       07/23/22 1850   Post-Hemodialysis Assessment   Rinseback Volume (mL) 250 mL   Blood Volume Processed (Liters) 58 L   Dialyzer Clearance Lightly streaked   Additional Fluid Intake (mL) 500 mL   Total UF (mL) 2000 mL   Net Fluid Removal 1500   Patient Response to Treatment Tolerated fair   Post-Treatment Weight 73.2 kg (161 lb 6 oz)   Treatment Weight Change -1.5   Post-Hemodialysis Comments Tx completed per protocol, blood reinfused without incident

## 2022-07-24 NOTE — PROGRESS NOTES
[facility] Hospital Medicine  Progress Note    Patient Name: Tabby Howard  MRN: [id number]  Patient Class: IP- Inpatient   Admission Date: 7/22/2022  Length of Stay: 2 days  Attending Physician: Tosin Roberts MD  Primary Care Provider: Primary Doctor No        Subjective:     Principal Problem:Acute hypoxemic respiratory failure        HPI:  Patient is a 32-year-old  female with past medical history significant for sickle cell anemia, end-stage renal disease (on hemodialysis every Tuesday/Thursday/Saturday), diabetes mellitus type 2, gastroparesis and hypertension who is being admitted to Hospital Medicine under inpatient status from [facility] Emergency Room where patient presented with worsening shortness of Breath started this morning.  Patient was scheduled to undergo removal of malfunctioning Apolinar catheter.  Patient reported compliance with dialysis therapy, patient did receive hemodialysis yesterday.  Patient has been coughing up purulent expectoration.  Denies any associated fevers or chills.  At present, patient is in significant respiratory distress and not able to provide further meaningful history of present illness.  Patient has 100% non-rebreather placed.  Patient denies any chest pain or palpitations.  Patient is reporting frequent nausea and abdominal pain.  Patient's blood sugars noted to be in excess of 600 mg%.      Overview/Hospital Course:  Patient was admitted to intensive care unit for acute hypoxemic respiratory failure.  Due to worsening respiratory status and hemodynamic instability, patient was intubated and subsequently underwent replacement of Apolinar catheter.  Patient received hemodialysis subsequently.  Currently patient is receiving broad-spectrum antibiotics for healthcare associated pneumonia and possible ARDS.  Dr. Miller from Pulmonary Medicine is closely monitoring and managing the ventilator.  Patient briefly required  intravenous Levophed infusion.  Nephrology team following.      Interval History:  Earlier this morning patient got extubated.  Patient is currently on 3 liter/minute supplemental oxygen via nasal cannula.  Denies any significant body pains.  Overnight patient received 1 unit of packed red blood cells for symptomatic anemia.  Awaiting swallow evaluation.  Currently afebrile.      Review of Systems   Constitutional:  Positive for fatigue. Negative for chills and fever.   HENT:  Negative for congestion and sore throat.    Eyes:  Negative for visual disturbance.   Respiratory:  Positive for cough and shortness of breath.    Cardiovascular:  Negative for chest pain and palpitations.   Gastrointestinal:  Negative for abdominal pain, nausea and vomiting.   Endocrine: Negative for cold intolerance and heat intolerance.   Genitourinary:  Negative for dysuria and hematuria.   Musculoskeletal:  Positive for back pain. Negative for arthralgias and myalgias.   Skin:  Negative for pallor and rash.   Neurological:  Positive for weakness. Negative for tremors and seizures.   Hematological:  Negative for adenopathy. Does not bruise/bleed easily.   Psychiatric/Behavioral:  Negative for hallucinations.    Objective:     Vital Signs (Most Recent):  Temp: 98.6 °F (37 °C) (07/24/22 0800)  Pulse: 75 (07/24/22 0800)  Resp: 17 (07/24/22 0800)  BP: 136/85 (07/24/22 0800)  SpO2: (!) 94 % (07/24/22 0800)   Vital Signs (24h Range):  Temp:  [95.36 °F (35.2 °C)-98.6 °F (37 °C)] 98.6 °F (37 °C)  Pulse:  [64-92] 75  Resp:  [12-33] 17  SpO2:  [86 %-100 %] 94 %  BP: (103-184)/() 136/85  Arterial Line BP: ()/() 152/81     Weight: 75.2 kg (165 lb 12.6 oz)  Body mass index is 30.32 kg/m².    Intake/Output Summary (Last 24 hours) at 7/24/2022 0906  Last data filed at 7/24/2022 0631  Gross per 24 hour   Intake 1337.19 ml   Output 2850 ml   Net -1512.81 ml        Physical Exam  Constitutional:       Appearance: She is well-developed.    HENT:      Head: Normocephalic and atraumatic.   Eyes:      Conjunctiva/sclera: Conjunctivae normal.      Pupils: Pupils are equal, round, and reactive to light.   Neck:      Thyroid: No thyromegaly.      Vascular: No JVD.   Cardiovascular:      Rate and Rhythm: Normal rate and regular rhythm.      Heart sounds: No murmur heard.    No friction rub. No gallop.   Pulmonary:      Effort: Pulmonary effort is normal.      Breath sounds: Wheezing present.   Abdominal:      General: Bowel sounds are normal. There is no distension.      Palpations: Abdomen is soft. There is no mass.      Tenderness: There is no abdominal tenderness.   Musculoskeletal:         General: Normal range of motion.      Cervical back: Neck supple.   Skin:     General: Skin is warm and dry.   Neurological:      Mental Status: She is oriented to person, place, and time.      Cranial Nerves: No cranial nerve deficit.   Psychiatric:         Behavior: Behavior normal.       Significant Labs: All pertinent labs within the past 24 hours have been reviewed.  CBC:   Recent Labs   Lab 07/22/22  1008 07/23/22  0550 07/23/22  1654 07/24/22  0438   WBC 24.50* 27.07*  --  11.35   HGB 8.2* 7.1* 6.2* 7.8*   HCT 22.8* 19.2*  --  21.4*    172  --  135*       CMP:   Recent Labs   Lab 07/23/22  0319 07/23/22  0550 07/24/22  0438    139 140   K 3.9 3.7 3.2*    106 104   CO2 19* 21* 25   * 166* 148*   BUN 36* 37* 25*   CREATININE 3.2* 3.4* 2.6*   CALCIUM 7.9* 7.7* 7.4*   PROT  --  4.4* 4.2*   ALBUMIN  --  1.7* 1.6*   BILITOT  --  0.9 2.1*   ALKPHOS  --  72 70   AST  --  15 14   ALT  --  18 19   ANIONGAP 12 12 11   EGFRNONAA 18* 17* 23*       Coagulation: No results for input(s): PT, INR, APTT in the last 48 hours.  Lactic Acid:   Recent Labs   Lab 07/22/22  1358 07/22/22  1653 07/23/22  1047   LACTATE 1.1 2.3* 1.1       Troponin: No results for input(s): TROPONINI in the last 48 hours.  TSH:   Recent Labs   Lab 07/08/22  1517   TSH 1.866        Urine Studies: No results for input(s): COLORU, APPEARANCEUA, PHUR, SPECGRAV, PROTEINUA, GLUCUA, KETONESU, BILIRUBINUA, OCCULTUA, NITRITE, UROBILINOGEN, LEUKOCYTESUR, RBCUA, WBCUA, BACTERIA, SQUAMEPITHEL, HYALINECASTS in the last 48 hours.    Invalid input(s): WRIGHTSUR  Microbiology Results (last 7 days)       Procedure Component Value Units Date/Time    Culture, Respiratory with Gram Stain [[id number]] Collected: 07/22/22 1550    Order Status: Completed Specimen: Sputum, Expectorated Updated: 07/24/22 0636     Respiratory Culture No Growth     Gram Stain (Respiratory) <10 epithelial cells per low power field.     Gram Stain (Respiratory) Rare WBC's     Gram Stain (Respiratory) No organisms seen    Blood culture x two cultures. Draw prior to antibiotics. [[id number]] Collected: 07/22/22 1112    Order Status: Completed Specimen: Blood from Antecubital, Right Arm Updated: 07/24/22 0614     Blood Culture, Routine No Growth to date      No Growth to date    Narrative:      Aerobic and anaerobic    Blood culture x two cultures. Draw prior to antibiotics. [[id number]] Collected: 07/22/22 1132    Order Status: Completed Specimen: Blood from Antecubital, Right Arm Updated: 07/24/22 0614     Blood Culture, Routine No Growth to date      No Growth to date    Narrative:      Aerobic and anaerobic          Significant Imaging:   CXR: New right basilar opacification suggesting pneumonia.  Interval removal of the right-sided central venous catheter and the left-sided central venous catheter has partially retracted with the distal tip now at the level of the confluence of the left brachiocephalic vein and SVC    CXR: Endotracheal tube ends in the brea and should be pulled back a short distance.  Central line placed in good position without pneumothorax.  Right lower lobe infiltrate consistent with pneumonia.    CXR: NG tube has been inserted with the tip at the level of the body of the stomach.  Tip of the endotracheal tube  "projects approximately 1.7 cm above the brea.  Bilateral lung infiltrates with right lung infiltrate not significantly changed or if anything slightly more confluent and with now mild left perihilar infiltrate along with left basilar opacification suggesting atelectasis    CXR: Pending.        Assessment/Plan:      * Acute hypoxemic respiratory failure  Continue supplemental oxygen to maintain pulse ox above 92%.  Follow Pulmonary recommendations.   Continue beta 2 agonist bronchodilator treatments.   Awaiting swallow evaluation.  Continue IV antibiotics Levaquin and vancomycin.  Pharmacist to dose Vancomycin.  Patient is penicillin allergic.  Check sputum GS and Cx.   Continue routine medications as before.             ARDS (adult respiratory distress syndrome)  Follow Pulmonary recommendations.      Hyperglycemia without ketosis  Continue low-dose insulin sliding scale coverage q.4 hours.        ESRD (end stage renal disease)  Hemodialysis catheter was replaced by Dr. Gan.  Hemodialysis therapy as per Nephrology team.      HCAP (healthcare-associated pneumonia), RLL  As above.  See "acute hypoxemic respiratory failure" management.      Anemia  Anemia of chronic disease and sickle cell anemia.  Patient received 1 unit of packed red blood cells for symptomatic anemia on July 23, 2022.   Monitor serial CBC and transfuse if patient becomes hemodynamically unstable, symptomatic or H/H drops below 7/21.        Seizure  Continue antiseizure medications and follow seizure precautions.      Gastroparesis  Supportive care with antiemetics and intravenous narcotics for pain control.      Type II diabetes mellitus  Patient's FSGs are uncontrolled due to hyperglycemia on current medication regimen.  Last A1c reviewed-   Lab Results   Component Value Date    HGBA1C 5.8 (H) 05/15/2022     Most recent fingerstick glucose reviewed-   Recent Labs   Lab 07/22/22  1313 07/22/22  1317 07/22/22  1427   POCTGLUCOSE >500* >500* " >500*     Current correctional scale  High, starting IV Insulin infusion  Increase anti-hyperglycemic dose as follows-   Antihyperglycemics (From admission, onward)            Start     Stop Route Frequency Ordered    07/22/22 1600  insulin regular 1 Units/mL in sodium chloride 0.9% 100 mL infusion        Question Answer Comment   Insulin Rate Adjustment (DO NOT MODIFY ANSWER) \\[email or web address]\[email or web address]\Images\[email or web address]\InsulinInfusions\INSULINHS [id number]    Enter initial dose from Infusion Protocol Chart (Units/hr): 5        -- IV Continuous 07/22/22 1448    07/22/22 1434  insulin aspart U-100 pen 0-5 Units         -- SubQ Before meals & nightly PRN 07/22/22 1434    07/22/22 1345  insulin regular injection 20 Units 0.2 mL         07/23 0144 IV ED 1 Time 07/22/22 1334        Hold Oral hypoglycemics while patient is in the hospital.    Start PT, OT and incentive spirometry.  VTE Risk Mitigation (From admission, onward)         Ordered     heparin (porcine) injection 2,800 Units  As needed (PRN)         07/22/22 1625     heparin (porcine) injection 5,000 Units  Every 8 hours         07/22/22 1434     IP VTE HIGH RISK PATIENT  Once         07/22/22 1434     Place sequential compression device  Until discontinued         07/22/22 1434                Discharge Planning   TATIANA:  7/26/22    Code Status: Full Code   Is the patient medically ready for discharge?:     Reason for patient still in hospital (select all that apply): Patient trending condition and Consult recommendations  Discharge Plan A: Home with family            Critical care time spent on the evaluation and treatment of severe organ dysfunction, review of pertinent labs and imaging studies, discussions with consulting providers and discussions with patient/family: 32 minutes.      Tosin Roberts MD  Department of Hospital Medicine   [facility]

## 2022-07-24 NOTE — ASSESSMENT & PLAN NOTE
Anemia of chronic disease and sickle cell anemia.  Patient received 1 unit of packed red blood cells for symptomatic anemia on July 23, 2022.   Monitor serial CBC and transfuse if patient becomes hemodynamically unstable, symptomatic or H/H drops below 7/21.

## 2022-07-24 NOTE — PLAN OF CARE
Care plan reviewed. Safety maintained. Patient remains on ventilator, intubated/sedated. Patient will sit straight up in the bed, pulling her arms and reaching for the tubes/wires. She coughs frequently: pink frothy sputum. Suctioned orally. OGT in place with 300 ml out for this shift. Noted that patient did not absorb meds administered through OGT (clamped 2 hours), all came out once OGT unclamped to LIWS. In that time, patient became nauseated (she shook her head yes to question if nauseated) and Zofran was given per PRN order. Patient c/o of back pain. Fentanyl drip ordered by Dr. Berman. Appears effective. Patient is currently NPO. Restraints in place and monitored per policy. Heel boots in place. Rotation therapy utilized. Patient did not void. Bladder scan showed 200 ml. Patient was getting HD so could not do an in and out cath. No BM, bowel sounds are very hypo and faint. Abdomen is soft. IV antibiotics for treatment of infection. Patient afebrile, in fact was slightly hypothermic of 95.5. Blankets placed on patient and current temp 96.6. Insulin drip discontinued. Accu checks changed to Q4H to be covered with low sliding scale PRN. Maintenance fluids of NS at 75 ml per hour to start at 0700. Patient to receive one unit of PRBC to be infused slowly for HGB 6.2. Blood transfusion phone consent received from patients mother, Tanya Howard. HD completed today with 1.5 liters removed. Patient mother came for a visit today. Patient seen by Dr. Roberts, Dr. Berman and Dr. Benitez.

## 2022-07-24 NOTE — PLAN OF CARE
Care plan reviewed. Safety maintained. Patient was extubated this AM previous shift. 3L/NC oxygen. Precedex was discontinued. Failed swallow eval. Second eval passed. SLP eval was ordered. SLP recommends that patient remain NPO except for ice chips and tsp sips of water with nursing supervision and head of bed 90 degrees. CLD discontinued and NPO ordered. Patient is aware and verbalized understanding. Patient with very faint bowel sounds. Patient denies need to void. Afebrile. No HD today. Accu checks to continue to be Q4H. PT worked with patient today. Patient ambulated and was up in chair for one hour. Patient had US of LUE veins. Results show partially occlusive thrombus within the left internal jugular and cephalic veins. Lovenox started today. Limb alert placed to patients left arm for no blood pressures or lab draws. Patient medicated once for HTN with PRN Hydralazine. Will monitor for effectiveness. Spoke with patient about compliance once she is discharged home. She verbalized how hard it is. Emotional support and prayer given as patient became tearful. Patient has had several visitors today and that has appeared to uplift her spirits. Patient seen by Dr. Roberts, Dr. Berman and Dr. Benitez.

## 2022-07-24 NOTE — PLAN OF CARE
Problem: SLP  Goal: SLP Goal  Description:     1. Pt will tolerate ice chips and occasional single tsp water with nursing (Head of bed elevated) without sign of airway compromise x overnight (plan reassessment tomorrow).    2. Pt will participate in ongoing assessment of swallow.  Outcome: Ongoing, Progressing

## 2022-07-25 ENCOUNTER — ANESTHESIA EVENT (OUTPATIENT)
Dept: SURGERY | Facility: HOSPITAL | Age: 32
DRG: 208 | End: 2022-07-25
Payer: MEDICAID

## 2022-07-25 LAB
ALBUMIN SERPL BCP-MCNC: 1.7 G/DL (ref 3.5–5.2)
ALP SERPL-CCNC: 76 U/L (ref 55–135)
ALT SERPL W/O P-5'-P-CCNC: 14 U/L (ref 10–44)
ANION GAP SERPL CALC-SCNC: 10 MMOL/L (ref 8–16)
AST SERPL-CCNC: 11 U/L (ref 10–40)
BACTERIA SPEC AEROBE CULT: NO GROWTH
BILIRUB SERPL-MCNC: 0.9 MG/DL (ref 0.1–1)
BUN SERPL-MCNC: 29 MG/DL (ref 6–20)
CALCIUM SERPL-MCNC: 7.2 MG/DL (ref 8.7–10.5)
CHLORIDE SERPL-SCNC: 105 MMOL/L (ref 95–110)
CO2 SERPL-SCNC: 24 MMOL/L (ref 23–29)
CREAT SERPL-MCNC: 3.4 MG/DL (ref 0.5–1.4)
ERYTHROCYTE [DISTWIDTH] IN BLOOD BY AUTOMATED COUNT: 15.9 % (ref 11.5–14.5)
EST. GFR  (AFRICAN AMERICAN): 19 ML/MIN/1.73 M^2
EST. GFR  (NON AFRICAN AMERICAN): 17 ML/MIN/1.73 M^2
GLUCOSE SERPL-MCNC: 174 MG/DL (ref 70–110)
GRAM STN SPEC: NORMAL
HCT VFR BLD AUTO: 20.8 % (ref 37–48.5)
HGB BLD-MCNC: 7.2 G/DL (ref 12–16)
MAGNESIUM SERPL-MCNC: 1.9 MG/DL (ref 1.6–2.6)
MCH RBC QN AUTO: 29.8 PG (ref 27–31)
MCHC RBC AUTO-ENTMCNC: 34.6 G/DL (ref 32–36)
MCV RBC AUTO: 86 FL (ref 82–98)
PHOSPHATE SERPL-MCNC: 4.6 MG/DL (ref 2.7–4.5)
PLATELET # BLD AUTO: 117 K/UL (ref 150–450)
PMV BLD AUTO: 10.1 FL (ref 9.2–12.9)
POCT GLUCOSE: 182 MG/DL (ref 70–110)
POCT GLUCOSE: 201 MG/DL (ref 70–110)
POCT GLUCOSE: 207 MG/DL (ref 70–110)
POCT GLUCOSE: 221 MG/DL (ref 70–110)
POTASSIUM SERPL-SCNC: 3.8 MMOL/L (ref 3.5–5.1)
PROT SERPL-MCNC: 4.4 G/DL (ref 6–8.4)
RBC # BLD AUTO: 2.42 M/UL (ref 4–5.4)
SODIUM SERPL-SCNC: 139 MMOL/L (ref 136–145)
WBC # BLD AUTO: 12.06 K/UL (ref 3.9–12.7)

## 2022-07-25 PROCEDURE — 92610 EVALUATE SWALLOWING FUNCTION: CPT

## 2022-07-25 PROCEDURE — 84100 ASSAY OF PHOSPHORUS: CPT | Performed by: INTERNAL MEDICINE

## 2022-07-25 PROCEDURE — 87081 CULTURE SCREEN ONLY: CPT | Performed by: INTERNAL MEDICINE

## 2022-07-25 PROCEDURE — 97165 OT EVAL LOW COMPLEX 30 MIN: CPT

## 2022-07-25 PROCEDURE — 97535 SELF CARE MNGMENT TRAINING: CPT

## 2022-07-25 PROCEDURE — 63600175 PHARM REV CODE 636 W HCPCS: Performed by: NURSE PRACTITIONER

## 2022-07-25 PROCEDURE — 25000003 PHARM REV CODE 250: Performed by: INTERNAL MEDICINE

## 2022-07-25 PROCEDURE — C9113 INJ PANTOPRAZOLE SODIUM, VIA: HCPCS | Performed by: INTERNAL MEDICINE

## 2022-07-25 PROCEDURE — A4216 STERILE WATER/SALINE, 10 ML: HCPCS | Performed by: INTERNAL MEDICINE

## 2022-07-25 PROCEDURE — 27000221 HC OXYGEN, UP TO 24 HOURS

## 2022-07-25 PROCEDURE — 63600175 PHARM REV CODE 636 W HCPCS: Performed by: INTERNAL MEDICINE

## 2022-07-25 PROCEDURE — 85027 COMPLETE CBC AUTOMATED: CPT | Performed by: INTERNAL MEDICINE

## 2022-07-25 PROCEDURE — 80053 COMPREHEN METABOLIC PANEL: CPT | Performed by: INTERNAL MEDICINE

## 2022-07-25 PROCEDURE — 20000000 HC ICU ROOM

## 2022-07-25 PROCEDURE — 99232 SBSQ HOSP IP/OBS MODERATE 35: CPT | Mod: ,,, | Performed by: INTERNAL MEDICINE

## 2022-07-25 PROCEDURE — 25000003 PHARM REV CODE 250: Performed by: ANESTHESIOLOGY

## 2022-07-25 PROCEDURE — 94761 N-INVAS EAR/PLS OXIMETRY MLT: CPT

## 2022-07-25 PROCEDURE — 94799 UNLISTED PULMONARY SVC/PX: CPT

## 2022-07-25 PROCEDURE — 99900035 HC TECH TIME PER 15 MIN (STAT)

## 2022-07-25 PROCEDURE — 83735 ASSAY OF MAGNESIUM: CPT | Performed by: INTERNAL MEDICINE

## 2022-07-25 PROCEDURE — 99232 PR SUBSEQUENT HOSPITAL CARE,LEVL II: ICD-10-PCS | Mod: ,,, | Performed by: INTERNAL MEDICINE

## 2022-07-25 RX ORDER — HYDRALAZINE HYDROCHLORIDE 20 MG/ML
10 INJECTION INTRAMUSCULAR; INTRAVENOUS ONCE
Status: COMPLETED | OUTPATIENT
Start: 2022-07-25 | End: 2022-07-25

## 2022-07-25 RX ORDER — LIDOCAINE HYDROCHLORIDE 10 MG/ML
1 INJECTION, SOLUTION EPIDURAL; INFILTRATION; INTRACAUDAL; PERINEURAL ONCE
Status: CANCELLED | OUTPATIENT
Start: 2022-07-25 | End: 2022-07-25

## 2022-07-25 RX ORDER — METOPROLOL TARTRATE 1 MG/ML
5 INJECTION, SOLUTION INTRAVENOUS ONCE
Status: COMPLETED | OUTPATIENT
Start: 2022-07-25 | End: 2022-07-25

## 2022-07-25 RX ORDER — HYDRALAZINE HYDROCHLORIDE 25 MG/1
50 TABLET, FILM COATED ORAL EVERY 8 HOURS
Status: DISCONTINUED | OUTPATIENT
Start: 2022-07-25 | End: 2022-07-26

## 2022-07-25 RX ORDER — ATENOLOL 50 MG/1
50 TABLET ORAL EVERY OTHER DAY
Status: DISCONTINUED | OUTPATIENT
Start: 2022-07-25 | End: 2022-07-26

## 2022-07-25 RX ORDER — LABETALOL HYDROCHLORIDE 5 MG/ML
10 INJECTION, SOLUTION INTRAVENOUS EVERY 4 HOURS PRN
Status: DISCONTINUED | OUTPATIENT
Start: 2022-07-25 | End: 2022-07-27 | Stop reason: HOSPADM

## 2022-07-25 RX ORDER — SODIUM CHLORIDE 9 MG/ML
INJECTION, SOLUTION INTRAVENOUS ONCE
Status: CANCELLED | OUTPATIENT
Start: 2022-07-25 | End: 2022-07-25

## 2022-07-25 RX ORDER — SODIUM CHLORIDE 9 MG/ML
INJECTION, SOLUTION INTRAVENOUS
Status: CANCELLED | OUTPATIENT
Start: 2022-07-25

## 2022-07-25 RX ORDER — LEVOFLOXACIN 500 MG/1
500 TABLET, FILM COATED ORAL EVERY OTHER DAY
Status: DISCONTINUED | OUTPATIENT
Start: 2022-07-25 | End: 2022-07-27 | Stop reason: HOSPADM

## 2022-07-25 RX ADMIN — Medication 10 ML: at 10:07

## 2022-07-25 RX ADMIN — Medication 10 ML: at 02:07

## 2022-07-25 RX ADMIN — ENOXAPARIN SODIUM 80 MG: 80 INJECTION SUBCUTANEOUS at 05:07

## 2022-07-25 RX ADMIN — HYDROMORPHONE HYDROCHLORIDE 2 MG: 2 INJECTION, SOLUTION INTRAMUSCULAR; INTRAVENOUS; SUBCUTANEOUS at 06:07

## 2022-07-25 RX ADMIN — INSULIN ASPART 1 UNITS: 100 INJECTION, SOLUTION INTRAVENOUS; SUBCUTANEOUS at 10:07

## 2022-07-25 RX ADMIN — HYDROMORPHONE HYDROCHLORIDE 2 MG: 2 INJECTION, SOLUTION INTRAMUSCULAR; INTRAVENOUS; SUBCUTANEOUS at 10:07

## 2022-07-25 RX ADMIN — METOROPROLOL TARTRATE 5 MG: 5 INJECTION, SOLUTION INTRAVENOUS at 04:07

## 2022-07-25 RX ADMIN — ATENOLOL 50 MG: 50 TABLET ORAL at 10:07

## 2022-07-25 RX ADMIN — FAMOTIDINE 20 MG: 20 TABLET, FILM COATED ORAL at 10:07

## 2022-07-25 RX ADMIN — HYDROCODONE BITARTRATE AND ACETAMINOPHEN 1 TABLET: 5; 325 TABLET ORAL at 05:07

## 2022-07-25 RX ADMIN — HYDRALAZINE HYDROCHLORIDE 10 MG: 20 INJECTION INTRAMUSCULAR; INTRAVENOUS at 08:07

## 2022-07-25 RX ADMIN — HYDRALAZINE HYDROCHLORIDE 50 MG: 25 TABLET, FILM COATED ORAL at 02:07

## 2022-07-25 RX ADMIN — LEVETIRACETAM 500 MG: 100 INJECTION, SOLUTION INTRAVENOUS at 08:07

## 2022-07-25 RX ADMIN — AMLODIPINE BESYLATE 10 MG: 5 TABLET ORAL at 10:07

## 2022-07-25 RX ADMIN — Medication 10 ML: at 04:07

## 2022-07-25 RX ADMIN — LEVOFLOXACIN 500 MG: 500 TABLET, FILM COATED ORAL at 02:07

## 2022-07-25 RX ADMIN — ONDANSETRON 4 MG: 2 INJECTION INTRAMUSCULAR; INTRAVENOUS at 08:07

## 2022-07-25 RX ADMIN — MUPIROCIN: 20 OINTMENT TOPICAL at 08:07

## 2022-07-25 RX ADMIN — INSULIN ASPART 2 UNITS: 100 INJECTION, SOLUTION INTRAVENOUS; SUBCUTANEOUS at 04:07

## 2022-07-25 RX ADMIN — HYDROMORPHONE HYDROCHLORIDE 2 MG: 2 INJECTION, SOLUTION INTRAMUSCULAR; INTRAVENOUS; SUBCUTANEOUS at 08:07

## 2022-07-25 RX ADMIN — ISOSORBIDE MONONITRATE 120 MG: 60 TABLET, EXTENDED RELEASE ORAL at 10:07

## 2022-07-25 RX ADMIN — Medication 6 MG: at 08:07

## 2022-07-25 RX ADMIN — SODIUM CHLORIDE: 0.9 INJECTION, SOLUTION INTRAVENOUS at 12:07

## 2022-07-25 RX ADMIN — FLUOXETINE 20 MG: 20 CAPSULE ORAL at 10:07

## 2022-07-25 RX ADMIN — HYDRALAZINE HYDROCHLORIDE 10 MG: 20 INJECTION INTRAMUSCULAR; INTRAVENOUS at 12:07

## 2022-07-25 RX ADMIN — HYDRALAZINE HYDROCHLORIDE 10 MG: 20 INJECTION, SOLUTION INTRAMUSCULAR; INTRAVENOUS at 02:07

## 2022-07-25 RX ADMIN — LABETALOL HYDROCHLORIDE 10 MG: 5 INJECTION INTRAVENOUS at 09:07

## 2022-07-25 RX ADMIN — HYDRALAZINE HYDROCHLORIDE 50 MG: 25 TABLET, FILM COATED ORAL at 10:07

## 2022-07-25 RX ADMIN — FOLIC ACID 1 MG: 1 TABLET ORAL at 10:07

## 2022-07-25 RX ADMIN — PANTOPRAZOLE SODIUM 40 MG: 40 INJECTION, POWDER, FOR SOLUTION INTRAVENOUS at 08:07

## 2022-07-25 NOTE — PROGRESS NOTES
Blood pressure 235/106.  Pt had hydralazine about 2 hours ago without any change in elevated blood pressure.  Notified NP.  Additional dose of hydralazine 10mg IV given.

## 2022-07-25 NOTE — PLAN OF CARE
Remains in ICU  O2 sats stable on 3 liters NC.  Productive cough with thick pinkish/tan sputum.    Afebrile.  Blood pressure elevated.  Did not respond to IV hydralazine.  Briefly responded to IV metoprolol.  Blood sugar 207 this am.  Covered with sliding scale.      Problem: Adult Inpatient Plan of Care  Goal: Plan of Care Review  Outcome: Ongoing, Progressing  Goal: Patient-Specific Goal (Individualized)  Outcome: Ongoing, Progressing  Goal: Absence of Hospital-Acquired Illness or Injury  Outcome: Ongoing, Progressing     Problem: Diabetes Comorbidity  Goal: Blood Glucose Level Within Targeted Range  Outcome: Ongoing, Progressing     Problem: Fluid and Electrolyte Imbalance (Acute Kidney Injury/Impairment)  Goal: Fluid and Electrolyte Balance  Outcome: Ongoing, Progressing     Problem: Oral Intake Inadequate (Acute Kidney Injury/Impairment)  Goal: Optimal Nutrition Intake  Outcome: Ongoing, Progressing     Problem: Renal Function Impairment (Acute Kidney Injury/Impairment)  Goal: Effective Renal Function  Outcome: Ongoing, Progressing     Problem: Infection  Goal: Absence of Infection Signs and Symptoms  Outcome: Ongoing, Progressing     Problem: Infection (Hemodialysis)  Goal: Absence of Infection Signs and Symptoms  Outcome: Ongoing, Progressing     Problem: Fluid Imbalance (Pneumonia)  Goal: Fluid Balance  Outcome: Ongoing, Progressing     Problem: Infection (Pneumonia)  Goal: Resolution of Infection Signs and Symptoms  Outcome: Ongoing, Progressing     Problem: Respiratory Compromise (Pneumonia)  Goal: Effective Oxygenation and Ventilation  Outcome: Ongoing, Progressing     Problem: Fall Injury Risk  Goal: Absence of Fall and Fall-Related Injury  Outcome: Ongoing, Progressing     Problem: Skin Injury Risk Increased  Goal: Skin Health and Integrity  Outcome: Ongoing, Progressing     Problem: Coping Ineffective  Goal: Effective Coping  Outcome: Ongoing, Progressing

## 2022-07-25 NOTE — PT/OT/SLP PROGRESS
Physical Therapy      Patient Name:  Tabby Howard   MRN:  [id number]    Patient not seen today secondary to Patient fatigue. Will follow-up 07/26/22.

## 2022-07-25 NOTE — PROGRESS NOTES
[facility] Hospital Medicine  Progress Note    Patient Name: Tabby Howard  MRN: [id number]  Patient Class: IP- Inpatient   Admission Date: 7/22/2022  Length of Stay: 3 days  Attending Physician: Qing Breen MD  Primary Care Provider: Primary Doctor No        Subjective:     Principal Problem:Acute hypoxemic respiratory failure        HPI:  Patient is a 32-year-old  female with past medical history significant for sickle cell anemia, end-stage renal disease (on hemodialysis every Tuesday/Thursday/Saturday), diabetes mellitus type 2, gastroparesis and hypertension who is being admitted to Hospital Medicine under inpatient status from [facility] Emergency Room where patient presented with worsening shortness of Breath started this morning.  Patient was scheduled to undergo removal of malfunctioning Apolinar catheter.  Patient reported compliance with dialysis therapy, patient did receive hemodialysis yesterday.  Patient has been coughing up purulent expectoration.  Denies any associated fevers or chills.  At present, patient is in significant respiratory distress and not able to provide further meaningful history of present illness.  Patient has 100% non-rebreather placed.  Patient denies any chest pain or palpitations.  Patient is reporting frequent nausea and abdominal pain.  Patient's blood sugars noted to be in excess of 600 mg%.      Overview/Hospital Course:  Patient was admitted to intensive care unit for acute hypoxemic respiratory failure.  Due to worsening respiratory status and hemodynamic instability, patient was intubated and subsequently underwent replacement of Apolinar catheter.  Patient received hemodialysis subsequently.  Currently patient is receiving broad-spectrum antibiotics for healthcare associated pneumonia and possible ARDS.  Dr. Miller from Pulmonary Medicine is closely monitoring and managing the ventilator.  Patient briefly  "required intravenous Levophed infusion.  Nephrology team following.  Upon improvement, patient got extubated.  Awaiting swallow evaluation.  Consulting PT/OT and ST. Insulin drip has been stopped and patient is requiring low-dose insulin sliding scale for coverage.    No chest pain, shortness of breath, lightheadedness. Tolerating oral intake. Not bleeding from any source     NAD, AO3  NC, AT  RRR  CTAB  SNTND+BS  No edema   PERRL  IJ in place    Vitals, labs and radiographs from past 24h reviewed and personally interpreted.       Assessment/Plan:      * Acute hypoxemic respiratory failure  Continue supplemental oxygen to maintain pulse ox above 92%.  Follow Pulmonary recommendations.   Continue beta 2 agonist bronchodilator treatments.   Awaiting swallow evaluation.  . Patient is penicillin allergic.  Check sputum GS and Cx.   Continue routine medications as before.   -MRSA swab requested  -continue levofloxacin           ARDS (adult respiratory distress syndrome)  Follow Pulmonary recommendations.      Hyperglycemia without ketosis  Continue low-dose insulin sliding scale coverage q.4 hours.        ESRD (end stage renal disease)  Hemodialysis catheter was replaced by Dr. Gan.  Hemodialysis therapy as per Nephrology team.      HCAP (healthcare-associated pneumonia), RLL  As above.  See "acute hypoxemic respiratory failure" management.      Anemia  Anemia of chronic disease and sickle cell anemia.  Patient received 1 unit of packed red blood cells for symptomatic anemia on July 23, 2022.   Monitor serial CBC and transfuse if patient becomes hemodynamically unstable, symptomatic or H/H drops below 7/21.  -To be transfused on 7/26 with HD        Seizure  Continue antiseizure medications and follow seizure precautions.      Gastroparesis  Supportive care with antiemetics and intravenous narcotics for pain control.      Type II diabetes mellitus  Patient's FSGs are uncontrolled due to hyperglycemia on current " medication regimen.  Last A1c reviewed-   Lab Results   Component Value Date    HGBA1C 5.8 (H) 05/15/2022     Most recent fingerstick glucose reviewed-   Recent Labs   Lab 07/22/22  1313 07/22/22  1317 07/22/22  1427   POCTGLUCOSE >500* >500* >500*     Current correctional scale  High, starting IV Insulin infusion  Increase anti-hyperglycemic dose as follows-   Antihyperglycemics (From admission, onward)            Start     Stop Route Frequency Ordered    07/22/22 1600  insulin regular 1 Units/mL in sodium chloride 0.9% 100 mL infusion        Question Answer Comment   Insulin Rate Adjustment (DO NOT MODIFY ANSWER) \\[email or web address]\InsulinInfusions\[email or web address] [id number]    Enter initial dose from Infusion Protocol Chart (Units/hr): 5        -- IV Continuous 07/22/22 1448    07/22/22 1434  insulin aspart U-100 pen 0-5 Units         -- SubQ Before meals & nightly PRN 07/22/22 1434    07/22/22 1345  insulin regular injection 20 Units 0.2 mL         07/23 0144 IV ED 1 Time 07/22/22 1334        Hold Oral hypoglycemics while patient is in the hospital.  Start PT, OT and incentive spirometry.  VTE Risk Mitigation (From admission, onward)         Ordered     enoxaparin injection 80 mg  Daily         07/24/22 1403     heparin (porcine) injection 2,800 Units  As needed (PRN)         07/22/22 1625     IP VTE HIGH RISK PATIENT  Once         07/22/22 1434     Place sequential compression device  Until discontinued         07/22/22 1434                Discharge Planning   TATIANA: 7/26/2022 7/26/22    Code Status: Full Code   Is the patient medically ready for discharge?:     Reason for patient still in hospital (select all that apply): Patient trending condition and Consult recommendations  Discharge Plan A: Home with family              Qing Breen MD  Department of Hospital Medicine   [facility]

## 2022-07-25 NOTE — PROGRESS NOTES
07/25/2022      Admit Date: 7/22/2022  Tabby Howard  New Patient Consult    Chief Complaint   Patient presents with    Abdominal Pain     Patient presented to pre-op for dialysis cath changed and was in extreme abdominal pain and was brought to the er        History of Present Illness:   Not clear what transpired to ppt current picture.  Pt seen by Dr Gan yesterday with h/o tunneled dialysis line recently placed in left neck having profuse bleeding and needing replacement.    Pt intubated and sedated.  Pt only 5 ft 2 inches with 8 cc/kg 400 cc tidal vol.  Would need 300 cc for 6 cc/kg pbw.  Pt had severe resp distress failing high ox and needing intubation for dialysis line manipulation.  Pt evaluated during and after line placed.  Pt was schedule to have dialysis line changed presenting dka/rll pneumonia.  Chart reviewed and pt examined.    Progress Note  PULMONARY    Admit Date: 7/22/2022 07/25/2022      SUBJECTIVE:     July 25th-no complaints, feeling better.  No recall of what transpired.      PFSH and Allergies reviewed.    OBJECTIVE:     Vitals (Most recent):  Vitals:    07/25/22 0740   BP:    Pulse: 83   Resp: (!) 30   Temp:        Vitals (24 hour range):  Temp:  [97.7 °F (36.5 °C)-98.78 °F (37.1 °C)]   Pulse:  [74-90]   Resp:  [7-30]   BP: (129-235)/()   SpO2:  [91 %-100 %]   Arterial Line BP: (150-162)/(81-90)       Intake/Output Summary (Last 24 hours) at 7/25/2022 0813  Last data filed at 7/24/2022 1800  Gross per 24 hour   Intake 1271.16 ml   Output --   Net 1271.16 ml          Physical Exam:  The patient's neuro status (alertness,orientation,cognitive function,motor skills,), pharyngeal exam (oral lesions, hygiene, abn dentition,), Neck (jvd,mass,thyroid,nodes in neck and above/below clavicle),RESPIRATORY(symmetry,effort,fremitus,percussion,auscultation),  Cor(rhythm,heart tones including gallops,perfusion,edema)ABD(distention,hepatic&splenomegaly,tenderness,masses),  Skin(rash,cyanosis),Psyc(affect,judgement,).  Exam negative except for these pertinent findings:    Alert, nasal cannula, clear chest, no distress, rest good.    Radiographs reviewed: view by direct vision    Results for orders placed during the hospital encounter of 07/22/22    X-Ray Chest 1 View    Narrative  EXAMINATION:  XR CHEST 1 VIEW    CLINICAL HISTORY:  ng placement;    TECHNIQUE:  Single frontal view of the chest was performed.    COMPARISON:  One-view for NG tube placement    FINDINGS:  NG tube is seen traversing the esophagus extending beneath the level of the diaphragms with the tip at the level of the body of the stomach    Endotracheal tube with the tip projecting approximately 1.7 cm above the brea    Right internal jugular venous catheter present with the tip at the level of the SVC    Other lines project over the chest    Cardiomediastinal silhouette stable.  Right lung infiltrate and small right pleural effusion not significantly changed.  Left lung base partially obscured with probable atelectasis left lung base along with mild left perihilar infiltrate    Impression  NG tube has been inserted with the tip at the level of the body of the stomach.  Tip of the endotracheal tube projects approximately 1.7 cm above the brea.  Bilateral lung infiltrates with right lung infiltrate not significantly changed or if anything slightly more confluent and with now mild left perihilar infiltrate along with left basilar opacification suggesting atelectasis      Electronically signed by: Sunshine Awan MD  Date:    07/22/2022  Time:    17:11  ]    Labs     Recent Labs   Lab 07/25/22  0453   WBC 12.06   HGB 7.2*   HCT 20.8*   *     Recent Labs   Lab 07/25/22  0453      K 3.8      CO2 24   BUN 29*   CREATININE 3.4*   *   CALCIUM 7.2*   MG 1.9   PHOS 4.6*   AST 11   ALT 14   ALKPHOS 76   BILITOT 0.9   PROT 4.4*   ALBUMIN 1.7*   No results for input(s): PH, PCO2, PO2, HCO3 in the last  24 hours.  Microbiology Results (last 7 days)     Procedure Component Value Units Date/Time    Blood culture x two cultures. Draw prior to antibiotics. [[id number]] Collected: 07/22/22 1112    Order Status: Completed Specimen: Blood from Antecubital, Right Arm Updated: 07/25/22 0613     Blood Culture, Routine No Growth to date      No Growth to date      No Growth to date    Narrative:      Aerobic and anaerobic    Blood culture x two cultures. Draw prior to antibiotics. [[id number]] Collected: 07/22/22 1132    Order Status: Completed Specimen: Blood from Antecubital, Right Arm Updated: 07/25/22 0613     Blood Culture, Routine No Growth to date      No Growth to date      No Growth to date    Narrative:      Aerobic and anaerobic    Culture, Respiratory with Gram Stain [[id number]] Collected: 07/22/22 1550    Order Status: Completed Specimen: Sputum, Expectorated Updated: 07/24/22 0636     Respiratory Culture No Growth     Gram Stain (Respiratory) <10 epithelial cells per low power field.     Gram Stain (Respiratory) Rare WBC's     Gram Stain (Respiratory) No organisms seen          Impression:  Active Hospital Problems    Diagnosis  POA    *Acute hypoxemic respiratory failure [J96.01]  Yes    HCAP (healthcare-associated pneumonia), RLL [J18.9]  Yes    ESRD (end stage renal disease) [N18.6]  Yes    Hyperglycemia without ketosis [R73.9]  Yes    ARDS (adult respiratory distress syndrome) [J80]  Yes    Anemia [D64.9]  Yes    Seizure [R56.9]  Yes    Gastroparesis [K31.84]  Yes    Type II diabetes mellitus [E11.9]  Yes      Resolved Hospital Problems   No resolved problems to display.                  .      Plan: cxr diffuse haze with density rll medially >> left- c/w pneumonia and diffuse lung injury.    Pt desaturated post procedure with peep 12- peep 30 x 30 seconds ppt sat rising from 83 on 100% to 100%.  Pt will be rxed for severe ards.     Sputum culture submitted.  Levaquin, cefepime, vanc dosed.       Versed/propofol/dilaudid ordered.     Levophed ordered.  Insulin drip ordered.       7/25/2022 no fever, vss, off abx, 3lpm,   Wbc 12, cxr 7/24 clearing pulm edema-would complete a course of antibiotic.  No change in plan.  Respiratory wise she looks very stable.  Exact diagnosis is not clear.

## 2022-07-25 NOTE — NURSING
Pt passed swallow eval per speech therapy. Regular renal diet, thin liquids ordered. Morning PO meds given late, OK'd by Dr Breen - no adverse events while taking medications.

## 2022-07-25 NOTE — PLAN OF CARE
Problem: Occupational Therapy  Goal: Occupational Therapy Goal  Description: Goals to be met by: 8/15/2022     Patient will increase functional independence with ADLs by performing:    LE Dressing with Supervision and Assistive Devices as needed.  Grooming while standing at sink with Supervision.  Toileting from toilet with Supervision for hygiene and clothing management.   Supine to sit with Supervision.  Toilet transfer to toilet with Supervision.  Upper extremity exercise program, with independence.    Outcome: Ongoing, Progressing

## 2022-07-25 NOTE — PLAN OF CARE
POC reviewed w/ pt - needs reinforcement. A&O x 3. Intermittent bouts of tearfulness throughout the day - appears sad, frustrated, fearful. Otherwise pt has been calm. Supplemental oxygen continued - 3L NC, will desat if off; productive cough w/ tan/pink/yellow sputum. Passed swallow study today - tolerating food well, low appetite. Hypertensive all day - has come down since this morning w/ PRN's and scheduled PO meds once able to take. BM x 1 this morning w/ moderate amount of urine. Anuric otherwise. Will go to surgery tomorrow for hemosplit HD catheter w/ Dr Gan. Pt will be dialyzed w/ transfusion of 2units of PRBC's tomorrow, per Dr. Figueroa. Pt currently has transfer orders - waiting for bed placement. Safety measures in place.     Problem: Adult Inpatient Plan of Care  Goal: Plan of Care Review  Outcome: Ongoing, Progressing  Goal: Patient-Specific Goal (Individualized)  Outcome: Ongoing, Progressing  Goal: Absence of Hospital-Acquired Illness or Injury  Outcome: Ongoing, Progressing  Goal: Optimal Comfort and Wellbeing  Outcome: Ongoing, Progressing     Problem: Diabetes Comorbidity  Goal: Blood Glucose Level Within Targeted Range  Outcome: Ongoing, Progressing     Problem: Oral Intake Inadequate (Acute Kidney Injury/Impairment)  Goal: Optimal Nutrition Intake  Outcome: Ongoing, Progressing     Problem: Infection  Goal: Absence of Infection Signs and Symptoms  Outcome: Ongoing, Progressing     Problem: Hemodynamic Instability (Hemodialysis)  Goal: Effective Tissue Perfusion  Outcome: Ongoing, Progressing     Problem: Fluid Imbalance (Pneumonia)  Goal: Fluid Balance  Outcome: Ongoing, Progressing     Problem: ARDS (Acute Respiratory Distress Syndrome)  Goal: Effective Oxygenation  Outcome: Ongoing, Progressing

## 2022-07-25 NOTE — PT/OT/SLP EVAL
Occupational Therapy   Evaluation    Name: Tabby Howard  MRN: [id number]  Admitting Diagnosis:  Acute hypoxemic respiratory failure  Recent Surgery: Procedure(s) (LRB):  REMOVAL, CATHETER, CENTRAL VENOUS, TUNNELED (N/A)  INSERTION, CATHETER, HEMODIALYSIS, DUAL LUMEN (N/A) 3 Days Post-Op    Recommendations:     Discharge Recommendations: rehabilitation facility, home health OT  Discharge Equipment Recommendations:   (TBD)  Barriers to discharge:  None    Assessment:     Tabby Howard is a 32 y.o. female with a medical diagnosis of Acute hypoxemic respiratory failure.  She presents with c/o fatigue, limiting participation with participation. Patient was agreeable to performing grooming tasks while seated upright in bed. Nurse notified. Performance deficits affecting function: weakness, impaired endurance, impaired self care skills, impaired functional mobility, gait instability, decreased lower extremity function, impaired cardiopulmonary response to activity.      Rehab Prognosis: Good; patient would benefit from acute skilled OT services to address these deficits and reach maximum level of function.       Plan:     Patient to be seen 4 x/week to address the above listed problems via self-care/home management, therapeutic activities, therapeutic exercises  · Plan of Care Expires: 08/15/22  · Plan of Care Reviewed with: patient    Subjective     Chief Complaint: none  Patient/Family Comments/goals: none    Occupational Profile:  Living Environment: Patient lives with father and stepmother in a 1st floor apt.   Previous level of function: Patient was independent with ADLs and mobility.   Equipment Used at Home:  walker, rolling  Assistance upon Discharge: Patient will receive assistance from family.     Pain/Comfort:  · Pain Rating 1: 0/10  · Pain Rating Post-Intervention 1: 0/10    Patients cultural, spiritual, [employer] conflicts given the current situation:      Objective:     Communicated with: RN prior to  session.  Patient found HOB elevated with oxygen, peripheral IV, blood pressure cuff, bed alarm, SCD upon OT entry to room.    General Precautions: Standard, fall   Orthopedic Precautions:N/A   Braces: N/A  Respiratory Status: Nasal cannula, flow 3 L/min    Occupational Performance:    Bed Mobility:    · Not assessed due to patient's c/o fatigue; See PT notes    Functional Mobility/Transfers:  · Not assessed due to fatigue; See PT notes.     Activities of Daily Living:  · Grooming: supervision with oral and facial hygiene while seated upright in bed  · Upper Body Dressing: stand by assistance   · Lower Body Dressing: moderate assistance  · Toileting: minimum assistance     Cognitive/Visual Perceptual:  Cognitive/Psychosocial Skills:     -       Oriented to: x4   -       Follows Commands/attention:Follows multistep  commands  -       Communication: clear/fluent  -       Safety awareness/insight to disability: intact   -       Mood/Affect/Coping skills/emotional control: Appropriate to situation and Cooperative  Visual/Perceptual:      -Intact     Physical Exam:  Postural examination/scapula alignment:    -       Rounded shoulders  -       Forward head  Upper Extremity Range of Motion:     -       Right Upper Extremity: WFL  -       Left Upper Extremity: WFL  Upper Extremity Strength:    -       Right Upper Extremity: WFL  -       Left Upper Extremity: WFL   Strength:    -       Right Upper Extremity: WFL  -       Left Upper Extremity: WFL  Fine Motor Coordination:    -       Intact  Gross motor coordination:   WFL in BUE    AMPAC 6 Click ADL:  AMPAC Total Score: 12    Treatment & Education:  OT ed pt on OT role & POC as well as discharge recommendations.    Education:    Patient left HOB elevated with all lines intact, call button in reach, bed alarm on and nurse notified    GOALS:   Multidisciplinary Problems     Occupational Therapy Goals        Problem: Occupational Therapy    Goal Priority Disciplines Outcome  Interventions   Occupational Therapy Goal     OT, PT/OT Ongoing, Progressing    Description: Goals to be met by: 8/15/2022     Patient will increase functional independence with ADLs by performing:    LE Dressing with Supervision and Assistive Devices as needed.  Grooming while standing at sink with Supervision.  Toileting from toilet with Supervision for hygiene and clothing management.   Supine to sit with Supervision.  Toilet transfer to toilet with Supervision.  Upper extremity exercise program, with independence.                     History:     Past Medical History:   Diagnosis Date    CKD (chronic kidney disease), stage IV 2022    Diabetes mellitus due to underlying condition with unspecified complications 2022    Gastroparesis 2022    Heart failure with preserved ejection fraction 2022    EF 55% on 3/22    History of gastroesophageal reflux (GERD)     History of supraventricular tachycardia     Hyperkalemia 2022    Hypertensive emergency 2022    Sickle cell trait 2022    Type 2 diabetes mellitus        Past Surgical History:   Procedure Laterality Date     SECTION      x 3    ESOPHAGOGASTRODUODENOSCOPY N/A 10/18/2019    Procedure: ESOPHAGOGASTRODUODENOSCOPY (EGD);  Surgeon: Gianluca Mendez MD;  Location: [facility];  Service: Endoscopy;  Laterality: N/A;    PLACEMENT OF DUAL-LUMEN VASCULAR CATHETER Left 2022    Procedure: INSERTION-CATHETER-JOSEPH;  Surgeon: Dionte Gan MD;  Location: [facility];  Service: General;  Laterality: Left;       Time Tracking:     OT Date of Treatment: 22  OT Start Time: 1010  OT Stop Time: 1025  OT Total Time (min): 15 min    Billable Minutes:Evaluation 5  Self Care/Home Management 10    2022

## 2022-07-25 NOTE — PT/OT/SLP EVAL
Speech Language Pathology Evaluation  Bedside Swallow    Patient Name:  Tabby Howard   MRN:  [id number]  Admitting Diagnosis: Acute hypoxemic respiratory failure    Recommendations:                 General Recommendations:  Follow-up not indicated  Diet recommendations:  Regular, Regular Diet - IDDSI Level 7, Thin   Aspiration Precautions: Standard aspiration precautions   General Precautions: Standard, fall  Communication strategies:  none    History:     Past Medical History:   Diagnosis Date    CKD (chronic kidney disease), stage IV 2022    Diabetes mellitus due to underlying condition with unspecified complications 2022    Gastroparesis 2022    Heart failure with preserved ejection fraction 2022    EF 55% on 3/22    History of gastroesophageal reflux (GERD)     History of supraventricular tachycardia     Hyperkalemia 2022    Hypertensive emergency 2022    Sickle cell trait 2022    Type 2 diabetes mellitus        Past Surgical History:   Procedure Laterality Date     SECTION      x 3    ESOPHAGOGASTRODUODENOSCOPY N/A 10/18/2019    Procedure: ESOPHAGOGASTRODUODENOSCOPY (EGD);  Surgeon: Gianluca Mendez MD;  Location: [facility];  Service: Endoscopy;  Laterality: N/A;    PLACEMENT OF DUAL-LUMEN VASCULAR CATHETER Left 2022    Procedure: INSERTION-CATHETER-JOSEPH;  Surgeon: Dionte Gan MD;  Location: [facility];  Service: General;  Laterality: Left;       Social History: Patient lives in the community    Prior Intubation HX:  extubated yesterday after 2 days    Chest X-Rays: Endotracheal tube tip at the level of the clavicles above the brea.  Enteric tube courses below diaphragm.  Right internal jugular central venous catheter tip projects over the right atrium.Cardiomediastinal silhouette appears unchanged.  Patchy airspace opacification in the bilateral lower lungs suspicious for pneumonia or aspiration.  No significant pleural effusion or pneumothorax.      Prior diet: regular textures & liquids      Subjective     I don't have any trouble eating  Patient goals: rest       Objective:     Oral Musculature Evaluation  · Oral Musculature: WNL  · Dentition: present and adequate  · Secretion Management: adequate  · Mucosal Quality: good  · Mandibular Strength and Mobility: WNL  · Oral Labial Strength and Mobility: WNL  · Lingual Strength and Mobility: WNL  · Velar Elevation: WNL  · Buccal Strength and Mobility: WNL  · Volitional Cough: productive; decreased intensity  · Volitional Swallow: upward laryngeal movement palpated  · Voice Prior to PO Intake: clear, no dysarthria, low volume    Bedside Swallow Eval:   Consistencies Assessed:  · Thin liquids via cup & straw & 3 oz water challenge via straw  · Puree via spoon  · Mixed consistencies peaches in thin juice  · Solids sheryl cracker     Oral Phase:   · WNL    Pharyngeal Phase:   · no overt clinical signs/symptoms of aspiration  · no overt clinical signs/symptoms of pharyngeal dysphagia    Compensatory Strategies  · None    Treatment: Education re impressions & recommendations    Assessment:     Tabby Howard is a 32 y.o. female with an SLP diagnosis of no s/s oropharyngeal dysphagia on regular textures & liquids.  Recommend IDDSI 7 diet.  No tx..     Goals:   Multidisciplinary Problems     SLP Goals     Not on file          Multidisciplinary Problems (Resolved)        Problem: SLP    Goal Priority Disciplines Outcome   SLP Goal   (Resolved)     SLP Met   Description: 1. Pt will tolerate ice chips and occasional single tsp water with nursing (Head of bed elevated) without sign of airway compromise x overnight (plan reassessment tomorrow).    2. Pt will participate in ongoing assessment of swallow.                   Plan:     · Patient to be seen:      · Plan of Care expires:     · Plan of Care reviewed with:  patient   · SLP Follow-Up:  No       Discharge recommendations:  other (see comments) (to be determined)    Barriers to Discharge:  None    Time Tracking:     SLP Treatment Date:   07/25/22  Speech Start Time:  1019  Speech Stop Time:  1035     Speech Total Time (min):  16 min    Billable Minutes: Eval Swallow and Oral Function 16    07/25/2022

## 2022-07-25 NOTE — CHAPLAIN
Visited pt again after last Friday's acute event/intubation. Please to see pt awake, alert; she thanked me for the prayer blanket I covered over her last Friday; she loves it; Pt is now a new palliative care pt; She is a [employer] and her bruce gives her strength and hope; welcomed me to pray over/with/for her; pt joined in with the praying; after prayer pt became emotional, grateful for making through the experience on Friday; told her I'd continue to keep her in my prayers.  will continue to follow closely. Lord, in your mercy.

## 2022-07-25 NOTE — PLAN OF CARE
Problem: SLP  Goal: SLP Goal  Description: 1. Pt will tolerate ice chips and occasional single tsp water with nursing (Head of bed elevated) without sign of airway compromise x overnight (plan reassessment tomorrow).    2. Pt will participate in ongoing assessment of swallow.  Outcome: Met   1. Met  2. BSS completed with regular textues & liquids.  No tx.

## 2022-07-25 NOTE — PROGRESS NOTES
INPATIENT NEPHROLOGY PROGRESS  [facility] NEPHROLOGY    Tabby Howard  07/25/2022    Reason for consultation:    ESRD    Chief Complaint:   Chief Complaint   Patient presents with    Abdominal Pain     Patient presented to pre-op for dialysis cath changed and was in extreme abdominal pain and was brought to the er           History of Present Illness:    Pt is a 31 yo woman with h/o ESRD on hemodialysis, diabetes, hypertension, anemia,  Supraventricular tachycardia, heart failure with preserved ejection fraction, sickle cell trait, and secondary hyperparathyroidism who was referred to the emergency room from her outpatient dialysis unit.  She experienced bleeding from her hemosplit catheter sight while undergoing dialysis.  I called the outpatient unit and they stated that she starting bleeding and her shirt was covered in blood and there was a puddle on the floor.  The bleeding stopped when hemodialysis was discontinued.  She isn't very cooperative with questioning.  She states she has nausea, shortness of breath and weakness. She denies chest pain, fever, neurologic deficits, or urinary or bowel complaints.      7/23  Had HD yesterday once line replaced.  3L UF, pt was put on pressors as SBP dropped into the 50's almost immediately.  She is intubated and sedated.  Addendum:  D/w Dr. Benitez--d/c'd HD orders, he would like to see pt first as she has been so unstable  7/24  Extubated this am.  Had HD yesterday, 1.5L UF, got a unit of blood.  Hgb dropped yesterday afternoon to 6.2, now 7.8 post transfusion.  Hypokalemic at 3.2 this am, has prn repletion orders.  Off pressors, has low dose sedation.  VSS.    7/25  Denies chest pain or sob.  No vomiting        Plan of Care:       Assessment:    ESRD  --t,th,sat hd  --fluid restrict  --renal dose medication  --needs new hemosplit catheter prior to d/c     hyperglycemia  --per primary    Anemia due to acute blood loss and esrd  --heparin free hd  --epogen with  hd  --transfuse with hd tomorrow.  (goal hg in esrd patients is 10-12)    Pneumonia  --cefepime and Vancomycin ordered  --oxygen supplementation as necessary    Hypertension  --fluid restrict  --uf with hd  --low salt diet  --amlodipine and atenolol resumed.  Can add hydralazine 25mg po bid if bp still high after getting her am meds    Hyponatremia  --improved after HD  --fluid restict when taking PO  --uf with hd    Metabolic acidosis  --35 bicarb bath      Thank you for allowing us to participate in this patient's care. We will continue to follow.    Vital Signs:  Temp Readings from Last 3 Encounters:   22 97.7 °F (36.5 °C) (Oral)   22 98.7 °F (37.1 °C) (Temporal)   22 98 °F (36.7 °C)       Pulse Readings from Last 3 Encounters:   22 86   22 91   22 96       BP Readings from Last 3 Encounters:   22 (!) 184/88   22 (!) 153/95   22 (!) 160/99       Weight:  Wt Readings from Last 3 Encounters:   22 75.2 kg (165 lb 12.6 oz)   22 74.8 kg (165 lb)   22 76.1 kg (167 lb 12.3 oz)       Past Medical & Surgical History:  Past Medical History:   Diagnosis Date    CKD (chronic kidney disease), stage IV 2022    Diabetes mellitus due to underlying condition with unspecified complications 2022    Gastroparesis 2022    Heart failure with preserved ejection fraction 2022    EF 55% on 3/22    History of gastroesophageal reflux (GERD)     History of supraventricular tachycardia     Hyperkalemia 2022    Hypertensive emergency 2022    Sickle cell trait 2022    Type 2 diabetes mellitus        Past Surgical History:   Procedure Laterality Date     SECTION      x 3    ESOPHAGOGASTRODUODENOSCOPY N/A 10/18/2019    Procedure: ESOPHAGOGASTRODUODENOSCOPY (EGD);  Surgeon: Gianluca Mendez MD;  [facility];  Service: Endoscopy;  Laterality: N/A;    PLACEMENT OF DUAL-LUMEN VASCULAR CATHETER Left 2022     Procedure: INSERTION-CATHETER-JOSEPH;  Surgeon: Dionte Gan MD;  Location: [facility];  Service: General;  Laterality: Left;       Past Social History:  Social History     Socioeconomic History    Marital status:    Tobacco Use    Smoking status: Never Smoker    Smokeless tobacco: Never Used   Substance and Sexual Activity    Alcohol use: No    Drug use: No    Sexual activity: Not Currently     Partners: Male     Birth control/protection: I.U.D.       Medications:  No current facility-administered medications on file prior to encounter.     Current Outpatient Medications on File Prior to Encounter   Medication Sig Dispense Refill    amLODIPine (NORVASC) 10 MG tablet Take 1 tablet (10 mg total) by mouth once daily. 30 tablet 11    atenoloL (TENORMIN) 50 MG tablet Take 1 tablet (50 mg total) by mouth every other day. 45 tablet 3    famotidine (PEPCID) 20 MG tablet Take 1 tablet (20 mg total) by mouth once daily. 30 tablet 0    FLUoxetine 20 MG capsule Take 1 capsule (20 mg total) by mouth once daily. 30 capsule 1    HYDROcodone-acetaminophen (NORCO) 5-325 mg per tablet Take 1 tablet by mouth every 6 (six) hours as needed for Pain. 20 tablet 0    insulin aspart U-100 (NOVOLOG) 100 unit/mL (3 mL) InPn pen Inject 1-10 Units into the skin before meals and at bedtime as needed (Hyperglycemia). 3 mL 3    isosorbide mononitrate (IMDUR) 60 MG 24 hr tablet Take 2 tablets (120 mg total) by mouth once daily. 30 tablet 1    LANTUS U-100 INSULIN 100 unit/mL injection SMARTSI Unit(s) SUB-Q Every Morning      levETIRAcetam (KEPPRA) 500 MG Tab Take 1 tablet (500 mg total) by mouth 2 (two) times daily. 60 tablet 1    polyethylene glycol (GLYCOLAX) 17 gram/dose powder Take 17 g by mouth 2 (two) times daily. 510 each 0    predniSONE (DELTASONE) 20 MG tablet Take 3 tablets (60 mg total) by mouth once daily for 4 days, THEN 2 tablets (40 mg total) once daily for 4 days, THEN 1 tablet (20 mg total) once daily  "for 4 days, THEN 0.5 tablets (10 mg total) once daily for 4 days. 26 tablet 0    [DISCONTINUED] furosemide (LASIX) 40 MG tablet Take 1 tablet (40 mg total) by mouth 2 (two) times daily. 60 tablet 0    [DISCONTINUED] pantoprazole (PROTONIX) 40 MG tablet Take 1 tablet (40 mg total) by mouth once daily. 30 tablet 3    [DISCONTINUED] torsemide (DEMADEX) 20 MG Tab Take 1 tablet (20 mg total) by mouth 2 (two) times a day. (Patient taking differently: Take 20 mg by mouth once daily.) 60 tablet 1     Scheduled Meds:   amLODIPine  10 mg Oral Daily    atenoloL  50 mg Oral Every other day    cloNIDine 0.2 mg/24 hr td ptwk  1 patch Transdermal Q7 Days    enoxaparin  1 mg/kg Subcutaneous Daily    [START ON 7/26/2022] epoetin teresa (PROCRIT) injection  100 Units/kg Subcutaneous Every Tues, Thurs, Sat    famotidine  20 mg Oral Daily    FLUoxetine  20 mg Oral Daily    folic acid  1 mg Oral Daily    isosorbide mononitrate  120 mg Oral Daily    levetiracetam IV  500 mg Intravenous Q12H    mupirocin   Nasal BID    pantoprazole  40 mg Intravenous Daily    polyethylene glycol  17 g Oral BID    sodium chloride 0.9%  10 mL Intravenous Q8H     Continuous Infusions:   sodium chloride 0.9% 50 mL/hr at 07/25/22 0028     PRN Meds:.    Allergies:  Penicillins    Past Family History:  Reviewed; refer to Hospitalist Admission Note    Review of Systems:  Review of Systems - All 14 systems reviewed and negative, except as noted in HPI    Physical Exam:    BP (!) 184/88   Pulse 86   Temp 97.7 °F (36.5 °C) (Oral)   Resp (!) 27   Ht 5' 2" (1.575 m)   Wt 75.2 kg (165 lb 12.6 oz)   SpO2 100%   Breastfeeding No   BMI 30.32 kg/m²     General Appearance:   ill appearing   Head:    Normocephalic, without obvious abnormality, atraumatic   Eyes:    PER, conjunctiva/corneas clear, EOM's intact in both eyes        Throat:   Lips, mucosa, and tongue normal; teeth and gums normal   Back:     Symmetric, no curvature, ROM normal, no CVA " tenderness   Lungs:     Right sided rhonchi   Chest wall:    No tenderness or deformity   Heart:    Regular rate and rhythm, S1 and S2 normal, no murmur, rub   or gallop   Abdomen:     Tender.  No rigidity or guarding.    Extremities:   Extremities normal, atraumatic, no cyanosis or edema   Pulses:   2+ and symmetric all extremities   MSK:   No joint or muscle swelling, tenderness or deformity   Skin:   Skin color, texture, turgor normal, no rashes or lesions   Neurologic:      No flap     Results:  Lab Results   Component Value Date     07/25/2022    K 3.8 07/25/2022     07/25/2022    CO2 24 07/25/2022    BUN 29 (H) 07/25/2022    CREATININE 3.4 (H) 07/25/2022    CALCIUM 7.2 (L) 07/25/2022    ANIONGAP 10 07/25/2022    ESTGFRAFRICA 19 (A) 07/25/2022    EGFRNONAA 17 (A) 07/25/2022       Lab Results   Component Value Date    CALCIUM 7.2 (L) 07/25/2022    PHOS 4.6 (H) 07/25/2022       Recent Labs   Lab 07/25/22  0453   WBC 12.06   RBC 2.42*   HGB 7.2*   HCT 20.8*   *   MCV 86   MCH 29.8   MCHC 34.6          I have personally reviewed pertinent radiological imaging and reports.    Patient care was time spent personally by me on the following activities:   · Obtaining a history  · Examination of patient.  · Providing medical care at the patients bedside.  · Developing a treatment plan with patient or surrogate and bedside caregivers  · Ordering and reviewing laboratory studies, radiographic studies, pulse oximetry.  · Ordering and performing treatments and interventions.  · Evaluation of patient's response to treatment.  · Discussions with consultants while on the unit and immediately available to the patient.  · Re-evaluation of the patient's condition.  · Documentation in the medical record.       Hugh Figueroa MD    Nephrology  [facility] [place]  [phone]

## 2022-07-25 NOTE — PROGRESS NOTES
Spoke with NP r/t elevated blood pressure despite extra dose of hydralazine.  NP suggested speaking to EICU about blood pressure.  Spoke with Dr Briscoe with EICU

## 2022-07-26 ENCOUNTER — ANESTHESIA (OUTPATIENT)
Dept: SURGERY | Facility: HOSPITAL | Age: 32
DRG: 208 | End: 2022-07-26
Payer: MEDICAID

## 2022-07-26 PROBLEM — J80 ARDS (ADULT RESPIRATORY DISTRESS SYNDROME): Status: RESOLVED | Noted: 2022-07-22 | Resolved: 2022-07-26

## 2022-07-26 PROBLEM — I82.90 ACUTE DEEP VEIN THROMBOSIS (DVT) OF NON-EXTREMITY VEIN: Status: ACTIVE | Noted: 2022-07-26

## 2022-07-26 LAB
ALBUMIN SERPL BCP-MCNC: 1.6 G/DL (ref 3.5–5.2)
ANION GAP SERPL CALC-SCNC: 11 MMOL/L (ref 8–16)
BASOPHILS # BLD AUTO: 0.02 K/UL (ref 0–0.2)
BASOPHILS NFR BLD: 0.2 % (ref 0–1.9)
BLD PROD TYP BPU: NORMAL
BLD PROD TYP BPU: NORMAL
BLOOD UNIT EXPIRATION DATE: NORMAL
BLOOD UNIT EXPIRATION DATE: NORMAL
BLOOD UNIT TYPE CODE: 600
BLOOD UNIT TYPE CODE: 600
BLOOD UNIT TYPE: NORMAL
BLOOD UNIT TYPE: NORMAL
BUN SERPL-MCNC: 35 MG/DL (ref 6–20)
CALCIUM SERPL-MCNC: 7.4 MG/DL (ref 8.7–10.5)
CHLORIDE SERPL-SCNC: 104 MMOL/L (ref 95–110)
CO2 SERPL-SCNC: 23 MMOL/L (ref 23–29)
CODING SYSTEM: NORMAL
CODING SYSTEM: NORMAL
CREAT SERPL-MCNC: 4 MG/DL (ref 0.5–1.4)
DIFFERENTIAL METHOD: ABNORMAL
DISPENSE STATUS: NORMAL
DISPENSE STATUS: NORMAL
EOSINOPHIL # BLD AUTO: 0.1 K/UL (ref 0–0.5)
EOSINOPHIL NFR BLD: 1.2 % (ref 0–8)
ERYTHROCYTE [DISTWIDTH] IN BLOOD BY AUTOMATED COUNT: 15.9 % (ref 11.5–14.5)
EST. GFR  (AFRICAN AMERICAN): 16 ML/MIN/1.73 M^2
EST. GFR  (NON AFRICAN AMERICAN): 14 ML/MIN/1.73 M^2
GLUCOSE SERPL-MCNC: 170 MG/DL (ref 70–110)
HCT VFR BLD AUTO: 23.4 % (ref 37–48.5)
HGB BLD-MCNC: 7.9 G/DL (ref 12–16)
IMM GRANULOCYTES # BLD AUTO: 0.07 K/UL (ref 0–0.04)
IMM GRANULOCYTES NFR BLD AUTO: 0.6 % (ref 0–0.5)
LYMPHOCYTES # BLD AUTO: 0.9 K/UL (ref 1–4.8)
LYMPHOCYTES NFR BLD: 8 % (ref 18–48)
MAGNESIUM SERPL-MCNC: 2 MG/DL (ref 1.6–2.6)
MCH RBC QN AUTO: 29.4 PG (ref 27–31)
MCHC RBC AUTO-ENTMCNC: 33.8 G/DL (ref 32–36)
MCV RBC AUTO: 87 FL (ref 82–98)
MONOCYTES # BLD AUTO: 0.7 K/UL (ref 0.3–1)
MONOCYTES NFR BLD: 5.8 % (ref 4–15)
NEUTROPHILS # BLD AUTO: 9.8 K/UL (ref 1.8–7.7)
NEUTROPHILS NFR BLD: 84.2 % (ref 38–73)
NRBC BLD-RTO: 0 /100 WBC
NUM UNITS TRANS PACKED RBC: NORMAL
NUM UNITS TRANS PACKED RBC: NORMAL
PHOSPHATE SERPL-MCNC: 5.3 MG/DL (ref 2.7–4.5)
PHOSPHATE SERPL-MCNC: 5.3 MG/DL (ref 2.7–4.5)
PLATELET # BLD AUTO: 146 K/UL (ref 150–450)
PMV BLD AUTO: 10 FL (ref 9.2–12.9)
POCT GLUCOSE: 150 MG/DL (ref 70–110)
POCT GLUCOSE: 191 MG/DL (ref 70–110)
POCT GLUCOSE: 218 MG/DL (ref 70–110)
POTASSIUM SERPL-SCNC: 4.1 MMOL/L (ref 3.5–5.1)
RBC # BLD AUTO: 2.69 M/UL (ref 4–5.4)
SODIUM SERPL-SCNC: 138 MMOL/L (ref 136–145)
WBC # BLD AUTO: 11.58 K/UL (ref 3.9–12.7)

## 2022-07-26 PROCEDURE — 25000003 PHARM REV CODE 250: Performed by: STUDENT IN AN ORGANIZED HEALTH CARE EDUCATION/TRAINING PROGRAM

## 2022-07-26 PROCEDURE — 63600175 PHARM REV CODE 636 W HCPCS: Performed by: STUDENT IN AN ORGANIZED HEALTH CARE EDUCATION/TRAINING PROGRAM

## 2022-07-26 PROCEDURE — D9220A PRA ANESTHESIA: Mod: CRNA,,, | Performed by: NURSE ANESTHETIST, CERTIFIED REGISTERED

## 2022-07-26 PROCEDURE — D9220A PRA ANESTHESIA: Mod: ANES,,, | Performed by: ANESTHESIOLOGY

## 2022-07-26 PROCEDURE — 99233 PR SUBSEQUENT HOSPITAL CARE,LEVL III: ICD-10-PCS | Mod: ,,, | Performed by: INTERNAL MEDICINE

## 2022-07-26 PROCEDURE — 25500020 PHARM REV CODE 255

## 2022-07-26 PROCEDURE — 77001 FLUOROGUIDE FOR VEIN DEVICE: CPT | Mod: 26,,, | Performed by: STUDENT IN AN ORGANIZED HEALTH CARE EDUCATION/TRAINING PROGRAM

## 2022-07-26 PROCEDURE — 27000221 HC OXYGEN, UP TO 24 HOURS

## 2022-07-26 PROCEDURE — 85025 COMPLETE CBC W/AUTO DIFF WBC: CPT | Performed by: INTERNAL MEDICINE

## 2022-07-26 PROCEDURE — 99233 SBSQ HOSP IP/OBS HIGH 50: CPT | Mod: ,,, | Performed by: INTERNAL MEDICINE

## 2022-07-26 PROCEDURE — 94761 N-INVAS EAR/PLS OXIMETRY MLT: CPT

## 2022-07-26 PROCEDURE — 25000003 PHARM REV CODE 250: Performed by: ANESTHESIOLOGY

## 2022-07-26 PROCEDURE — 36558 INSERT TUNNELED CV CATH: CPT | Mod: RT,,, | Performed by: STUDENT IN AN ORGANIZED HEALTH CARE EDUCATION/TRAINING PROGRAM

## 2022-07-26 PROCEDURE — 80069 RENAL FUNCTION PANEL: CPT | Performed by: INTERNAL MEDICINE

## 2022-07-26 PROCEDURE — 36430 TRANSFUSION BLD/BLD COMPNT: CPT

## 2022-07-26 PROCEDURE — C9113 INJ PANTOPRAZOLE SODIUM, VIA: HCPCS | Performed by: INTERNAL MEDICINE

## 2022-07-26 PROCEDURE — 36000706: Performed by: STUDENT IN AN ORGANIZED HEALTH CARE EDUCATION/TRAINING PROGRAM

## 2022-07-26 PROCEDURE — 94799 UNLISTED PULMONARY SVC/PX: CPT

## 2022-07-26 PROCEDURE — 25000003 PHARM REV CODE 250: Performed by: INTERNAL MEDICINE

## 2022-07-26 PROCEDURE — A4216 STERILE WATER/SALINE, 10 ML: HCPCS | Performed by: INTERNAL MEDICINE

## 2022-07-26 PROCEDURE — 80100014 HC HEMODIALYSIS 1:1

## 2022-07-26 PROCEDURE — 71000033 HC RECOVERY, INTIAL HOUR: Performed by: STUDENT IN AN ORGANIZED HEALTH CARE EDUCATION/TRAINING PROGRAM

## 2022-07-26 PROCEDURE — D9220A PRA ANESTHESIA: ICD-10-PCS | Mod: CRNA,,, | Performed by: NURSE ANESTHETIST, CERTIFIED REGISTERED

## 2022-07-26 PROCEDURE — 63600175 PHARM REV CODE 636 W HCPCS: Performed by: INTERNAL MEDICINE

## 2022-07-26 PROCEDURE — 99900035 HC TECH TIME PER 15 MIN (STAT)

## 2022-07-26 PROCEDURE — 77001 CHG FLUOROGUIDE CNTRL VEN ACCESS,PLACE,REPLACE,REMOVE: ICD-10-PCS | Mod: 26,,, | Performed by: STUDENT IN AN ORGANIZED HEALTH CARE EDUCATION/TRAINING PROGRAM

## 2022-07-26 PROCEDURE — D9220A PRA ANESTHESIA: ICD-10-PCS | Mod: ANES,,, | Performed by: ANESTHESIOLOGY

## 2022-07-26 PROCEDURE — 36558 PR INSERT TUNNELED CV CATH W/O PORT OR PUMP: ICD-10-PCS | Mod: RT,,, | Performed by: STUDENT IN AN ORGANIZED HEALTH CARE EDUCATION/TRAINING PROGRAM

## 2022-07-26 PROCEDURE — 63600175 PHARM REV CODE 636 W HCPCS: Performed by: NURSE ANESTHETIST, CERTIFIED REGISTERED

## 2022-07-26 PROCEDURE — 25000003 PHARM REV CODE 250: Performed by: NURSE ANESTHETIST, CERTIFIED REGISTERED

## 2022-07-26 PROCEDURE — P9016 RBC LEUKOCYTES REDUCED: HCPCS | Performed by: INTERNAL MEDICINE

## 2022-07-26 PROCEDURE — 37000008 HC ANESTHESIA 1ST 15 MINUTES: Performed by: STUDENT IN AN ORGANIZED HEALTH CARE EDUCATION/TRAINING PROGRAM

## 2022-07-26 PROCEDURE — 36000707: Performed by: STUDENT IN AN ORGANIZED HEALTH CARE EDUCATION/TRAINING PROGRAM

## 2022-07-26 PROCEDURE — C1750 CATH, HEMODIALYSIS,LONG-TERM: HCPCS | Performed by: STUDENT IN AN ORGANIZED HEALTH CARE EDUCATION/TRAINING PROGRAM

## 2022-07-26 PROCEDURE — 20000000 HC ICU ROOM

## 2022-07-26 PROCEDURE — 83735 ASSAY OF MAGNESIUM: CPT | Performed by: INTERNAL MEDICINE

## 2022-07-26 PROCEDURE — 37000009 HC ANESTHESIA EA ADD 15 MINS: Performed by: STUDENT IN AN ORGANIZED HEALTH CARE EDUCATION/TRAINING PROGRAM

## 2022-07-26 DEVICE — KIT CATH HEMOSPLIT 14FR 23CM: Type: IMPLANTABLE DEVICE | Site: CHEST | Status: FUNCTIONAL

## 2022-07-26 RX ORDER — LIDOCAINE HCL/PF 100 MG/5ML
SYRINGE (ML) INTRAVENOUS
Status: DISCONTINUED | OUTPATIENT
Start: 2022-07-26 | End: 2022-07-26

## 2022-07-26 RX ORDER — FENTANYL CITRATE 50 UG/ML
INJECTION, SOLUTION INTRAMUSCULAR; INTRAVENOUS
Status: DISCONTINUED | OUTPATIENT
Start: 2022-07-26 | End: 2022-07-26

## 2022-07-26 RX ORDER — FENTANYL CITRATE 50 UG/ML
25 INJECTION, SOLUTION INTRAMUSCULAR; INTRAVENOUS EVERY 5 MIN PRN
Status: DISCONTINUED | OUTPATIENT
Start: 2022-07-26 | End: 2022-07-26

## 2022-07-26 RX ORDER — HYDRALAZINE HYDROCHLORIDE 25 MG/1
100 TABLET, FILM COATED ORAL EVERY 8 HOURS
Status: DISCONTINUED | OUTPATIENT
Start: 2022-07-26 | End: 2022-07-27 | Stop reason: HOSPADM

## 2022-07-26 RX ORDER — HYDROMORPHONE HYDROCHLORIDE 2 MG/ML
0.2 INJECTION, SOLUTION INTRAMUSCULAR; INTRAVENOUS; SUBCUTANEOUS EVERY 5 MIN PRN
Status: DISCONTINUED | OUTPATIENT
Start: 2022-07-26 | End: 2022-07-26

## 2022-07-26 RX ORDER — DEXAMETHASONE SODIUM PHOSPHATE 4 MG/ML
INJECTION, SOLUTION INTRA-ARTICULAR; INTRALESIONAL; INTRAMUSCULAR; INTRAVENOUS; SOFT TISSUE
Status: DISCONTINUED | OUTPATIENT
Start: 2022-07-26 | End: 2022-07-26

## 2022-07-26 RX ORDER — ONDANSETRON HYDROCHLORIDE 2 MG/ML
INJECTION, SOLUTION INTRAMUSCULAR; INTRAVENOUS
Status: DISCONTINUED | OUTPATIENT
Start: 2022-07-26 | End: 2022-07-26

## 2022-07-26 RX ORDER — BUPIVACAINE HYDROCHLORIDE AND EPINEPHRINE 5; 5 MG/ML; UG/ML
INJECTION, SOLUTION EPIDURAL; INTRACAUDAL; PERINEURAL
Status: DISCONTINUED | OUTPATIENT
Start: 2022-07-26 | End: 2022-07-26 | Stop reason: HOSPADM

## 2022-07-26 RX ORDER — OXYCODONE HYDROCHLORIDE 5 MG/1
5 TABLET ORAL
Status: DISCONTINUED | OUTPATIENT
Start: 2022-07-26 | End: 2022-07-27 | Stop reason: HOSPADM

## 2022-07-26 RX ORDER — CARVEDILOL 25 MG/1
25 TABLET ORAL 2 TIMES DAILY
Status: DISCONTINUED | OUTPATIENT
Start: 2022-07-26 | End: 2022-07-27 | Stop reason: HOSPADM

## 2022-07-26 RX ORDER — MIDAZOLAM HYDROCHLORIDE 1 MG/ML
INJECTION, SOLUTION INTRAMUSCULAR; INTRAVENOUS
Status: DISCONTINUED | OUTPATIENT
Start: 2022-07-26 | End: 2022-07-26

## 2022-07-26 RX ORDER — HEPARIN SODIUM,PORCINE/PF 10 UNIT/ML
SYRINGE (ML) INTRAVENOUS
Status: DISCONTINUED | OUTPATIENT
Start: 2022-07-26 | End: 2022-07-26 | Stop reason: HOSPADM

## 2022-07-26 RX ORDER — PROPOFOL 10 MG/ML
VIAL (ML) INTRAVENOUS
Status: DISCONTINUED | OUTPATIENT
Start: 2022-07-26 | End: 2022-07-26

## 2022-07-26 RX ADMIN — HYDRALAZINE HYDROCHLORIDE 10 MG: 20 INJECTION INTRAMUSCULAR; INTRAVENOUS at 03:07

## 2022-07-26 RX ADMIN — INSULIN ASPART 1 UNITS: 100 INJECTION, SOLUTION INTRAVENOUS; SUBCUTANEOUS at 09:07

## 2022-07-26 RX ADMIN — LIDOCAINE HYDROCHLORIDE 75 MG: 20 INJECTION INTRAVENOUS at 02:07

## 2022-07-26 RX ADMIN — MUPIROCIN: 20 OINTMENT TOPICAL at 09:07

## 2022-07-26 RX ADMIN — Medication 10 ML: at 09:07

## 2022-07-26 RX ADMIN — SODIUM CHLORIDE, SODIUM GLUCONATE, SODIUM ACETATE, POTASSIUM CHLORIDE, MAGNESIUM CHLORIDE, SODIUM PHOSPHATE, DIBASIC, AND POTASSIUM PHOSPHATE: .53; .5; .37; .037; .03; .012; .00082 INJECTION, SOLUTION INTRAVENOUS at 02:07

## 2022-07-26 RX ADMIN — Medication 10 ML: at 02:07

## 2022-07-26 RX ADMIN — OXYCODONE 5 MG: 5 TABLET ORAL at 09:07

## 2022-07-26 RX ADMIN — PANTOPRAZOLE SODIUM 40 MG: 40 INJECTION, POWDER, FOR SOLUTION INTRAVENOUS at 10:07

## 2022-07-26 RX ADMIN — HYDRALAZINE HYDROCHLORIDE 100 MG: 25 TABLET, FILM COATED ORAL at 10:07

## 2022-07-26 RX ADMIN — DEXAMETHASONE SODIUM PHOSPHATE 4 MG: 4 INJECTION, SOLUTION INTRA-ARTICULAR; INTRALESIONAL; INTRAMUSCULAR; INTRAVENOUS; SOFT TISSUE at 02:07

## 2022-07-26 RX ADMIN — MIDAZOLAM 1 MG: 1 INJECTION INTRAMUSCULAR; INTRAVENOUS at 02:07

## 2022-07-26 RX ADMIN — FLUOXETINE 20 MG: 20 CAPSULE ORAL at 10:07

## 2022-07-26 RX ADMIN — AMLODIPINE BESYLATE 10 MG: 5 TABLET ORAL at 10:07

## 2022-07-26 RX ADMIN — FOLIC ACID 1 MG: 1 TABLET ORAL at 10:07

## 2022-07-26 RX ADMIN — IOHEXOL 75 ML: 350 INJECTION, SOLUTION INTRAVENOUS at 09:07

## 2022-07-26 RX ADMIN — FENTANYL CITRATE 25 MCG: 50 INJECTION, SOLUTION INTRAMUSCULAR; INTRAVENOUS at 02:07

## 2022-07-26 RX ADMIN — ENOXAPARIN SODIUM 80 MG: 80 INJECTION SUBCUTANEOUS at 05:07

## 2022-07-26 RX ADMIN — POLYETHYLENE GLYCOL 3350 17 G: 17 POWDER, FOR SOLUTION ORAL at 10:07

## 2022-07-26 RX ADMIN — LABETALOL HYDROCHLORIDE 10 MG: 5 INJECTION INTRAVENOUS at 01:07

## 2022-07-26 RX ADMIN — HEPARIN SODIUM 2800 UNITS: 1000 INJECTION INTRAVENOUS; SUBCUTANEOUS at 12:07

## 2022-07-26 RX ADMIN — HYDROMORPHONE HYDROCHLORIDE 2 MG: 2 INJECTION, SOLUTION INTRAMUSCULAR; INTRAVENOUS; SUBCUTANEOUS at 01:07

## 2022-07-26 RX ADMIN — ONDANSETRON 4 MG: 2 INJECTION, SOLUTION INTRAMUSCULAR; INTRAVENOUS at 02:07

## 2022-07-26 RX ADMIN — CARVEDILOL 25 MG: 25 TABLET, FILM COATED ORAL at 09:07

## 2022-07-26 RX ADMIN — LABETALOL HYDROCHLORIDE 10 MG: 5 INJECTION INTRAVENOUS at 06:07

## 2022-07-26 RX ADMIN — FAMOTIDINE 20 MG: 20 TABLET, FILM COATED ORAL at 10:07

## 2022-07-26 RX ADMIN — HYDRALAZINE HYDROCHLORIDE 50 MG: 25 TABLET, FILM COATED ORAL at 05:07

## 2022-07-26 RX ADMIN — HYDROMORPHONE HYDROCHLORIDE 2 MG: 2 INJECTION, SOLUTION INTRAMUSCULAR; INTRAVENOUS; SUBCUTANEOUS at 06:07

## 2022-07-26 RX ADMIN — OXYCODONE 5 MG: 5 TABLET ORAL at 05:07

## 2022-07-26 RX ADMIN — PROPOFOL 100 MG: 10 INJECTION, EMULSION INTRAVENOUS at 02:07

## 2022-07-26 RX ADMIN — Medication 10 ML: at 05:07

## 2022-07-26 RX ADMIN — SODIUM CHLORIDE: 0.9 INJECTION, SOLUTION INTRAVENOUS at 10:07

## 2022-07-26 RX ADMIN — LEVETIRACETAM 500 MG: 100 INJECTION, SOLUTION INTRAVENOUS at 09:07

## 2022-07-26 RX ADMIN — HYDROCODONE BITARTRATE AND ACETAMINOPHEN 1 TABLET: 5; 325 TABLET ORAL at 10:07

## 2022-07-26 RX ADMIN — ISOSORBIDE MONONITRATE 120 MG: 60 TABLET, EXTENDED RELEASE ORAL at 10:07

## 2022-07-26 RX ADMIN — EPOETIN ALFA-EPBX 7500 UNITS: 10000 INJECTION, SOLUTION INTRAVENOUS; SUBCUTANEOUS at 11:07

## 2022-07-26 RX ADMIN — HYDRALAZINE HYDROCHLORIDE 100 MG: 25 TABLET, FILM COATED ORAL at 05:07

## 2022-07-26 RX ADMIN — MUPIROCIN: 20 OINTMENT TOPICAL at 10:07

## 2022-07-26 RX ADMIN — HYDROMORPHONE HYDROCHLORIDE 2 MG: 2 INJECTION, SOLUTION INTRAMUSCULAR; INTRAVENOUS; SUBCUTANEOUS at 08:07

## 2022-07-26 RX ADMIN — LEVETIRACETAM 500 MG: 100 INJECTION, SOLUTION INTRAVENOUS at 10:07

## 2022-07-26 NOTE — PROGRESS NOTES
[facility] Hospital Medicine  Progress Note    Patient Name: Tabby Howard  MRN: [id number]  Patient Class: IP- Inpatient   Admission Date: 7/22/2022  Length of Stay: 4 days  Attending Physician: Qing Breen MD  Primary Care Provider: Primary Doctor No        Subjective:     Principal Problem:Acute hypoxemic respiratory failure        HPI:  Patient is a 32-year-old  female with past medical history significant for sickle cell anemia, end-stage renal disease (on hemodialysis every Tuesday/Thursday/Saturday), diabetes mellitus type 2, gastroparesis and hypertension who is being admitted to Hospital Medicine under inpatient status from [facility] Emergency Room where patient presented with worsening shortness of Breath started this morning.  Patient was scheduled to undergo removal of malfunctioning Apolinar catheter.  Patient reported compliance with dialysis therapy, patient did receive hemodialysis yesterday.  Patient has been coughing up purulent expectoration.  Denies any associated fevers or chills.  At present, patient is in significant respiratory distress and not able to provide further meaningful history of present illness.  Patient has 100% non-rebreather placed.  Patient denies any chest pain or palpitations.  Patient is reporting frequent nausea and abdominal pain.  Patient's blood sugars noted to be in excess of 600 mg%.      Overview/Hospital Course:  Patient was admitted to intensive care unit for acute hypoxemic respiratory failure.  Due to worsening respiratory status and hemodynamic instability, patient was intubated and subsequently underwent replacement of Apolinar catheter.  Patient received hemodialysis subsequently.  Currently patient is receiving broad-spectrum antibiotics for healthcare associated pneumonia and possible ARDS.  Dr. Miller from Pulmonary Medicine is closely monitoring and managing the ventilator.  Patient briefly  "required intravenous Levophed infusion.  Nephrology team following.  Upon improvement, patient got extubated.  Awaiting swallow evaluation.  Consulting PT/OT and ST. Insulin drip has been stopped and patient is requiring low-dose insulin sliding scale for coverage.    No chest pain, shortness of breath, lightheadedness. Tolerating oral intake. Not bleeding from any source     NAD, AO3  NC, AT  RRR  CTAB  SNTND+BS  No edema   PERRL  IJ in place    Vitals, labs and radiographs from past 24h reviewed and personally interpreted.       Assessment/Plan:      * Acute hypoxemic respiratory failure  Continue supplemental oxygen to maintain pulse ox above 92%.  Follow Pulmonary recommendations.   Continue beta 2 agonist bronchodilator treatments.   Awaiting swallow evaluation.  . Patient is penicillin allergic.  Check sputum GS and Cx.   Continue routine medications as before.   -MRSA swab requested  -continue levofloxacin     HTN  -adding coreg 25 bid and increasing hydralazine to 100 tid      ARDS (adult respiratory distress syndrome)  Follow Pulmonary recommendations.      Hyperglycemia without ketosis  Continue low-dose insulin sliding scale coverage q.4 hours.        ESRD (end stage renal disease)  Hemodialysis catheter was replaced by Dr. Gan; permacath now placed on 7/26 Hemodialysis therapy as per Nephrology team.      HCAP (healthcare-associated pneumonia), RLL  As above.  See "acute hypoxemic respiratory failure" management.      Anemia  Anemia of chronic disease and sickle cell anemia.  Patient received 1 unit of packed red blood cells for symptomatic anemia on July 23, 2022.   Monitor serial CBC and transfuse if patient becomes hemodynamically unstable, symptomatic or H/H drops below 7/21.  -To be transfused on 7/26 with HD        Seizure  Continue antiseizure medications and follow seizure precautions.      Gastroparesis  Supportive care with antiemetics and intravenous narcotics for pain control.      Type II " diabetes mellitus  Patient's FSGs are uncontrolled due to hyperglycemia on current medication regimen.  Last A1c reviewed-   Lab Results   Component Value Date    HGBA1C 5.8 (H) 05/15/2022     Most recent fingerstick glucose reviewed-   Recent Labs   Lab 07/22/22  1313 07/22/22  1317 07/22/22  1427   POCTGLUCOSE >500* >500* >500*     Current correctional scale  High, starting IV Insulin infusion  Increase anti-hyperglycemic dose as follows-   Antihyperglycemics (From admission, onward)            Start     Stop Route Frequency Ordered    07/22/22 1600  insulin regular 1 Units/mL in sodium chloride 0.9% 100 mL infusion        Question Answer Comment   Insulin Rate Adjustment (DO NOT MODIFY ANSWER) [email or web address]    Enter initial dose from Infusion Protocol Chart (Units/hr): 5        -- IV Continuous 07/22/22 1448    07/22/22 1434  insulin aspart U-100 pen 0-5 Units         -- SubQ Before meals & nightly PRN 07/22/22 1434    07/22/22 1345  insulin regular injection 20 Units 0.2 mL         07/23 0144 IV ED 1 Time 07/22/22 1334        Hold Oral hypoglycemics while patient is in the hospital.  Start PT, OT and incentive spirometry.  VTE Risk Mitigation (From admission, onward)         Ordered     enoxaparin injection 80 mg  Daily         07/24/22 1403     heparin (porcine) injection 2,800 Units  As needed (PRN)         07/22/22 1625     IP VTE HIGH RISK PATIENT  Once         07/22/22 1434     Place sequential compression device  Until discontinued         07/22/22 1434                Discharge Planning   TATIANA: 7/26/2022 7/26/22    Code Status: Full Code   Is the patient medically ready for discharge?:     Reason for patient still in hospital (select all that apply): Patient trending condition and Consult recommendations  Discharge Plan A: Home with family              Qing Breen MD  Department of Hospital Medicine   [facility]

## 2022-07-26 NOTE — ANESTHESIA POSTPROCEDURE EVALUATION
Anesthesia Post Evaluation    Patient: Tabby Howard    Procedure(s) Performed: Procedure(s) (LRB):  INSERTION-CATHETER-Hemosplit (Right)    Final Anesthesia Type: general      Patient location during evaluation: PACU  Patient participation: Yes- Able to Participate  Level of consciousness: sedated and awake  Post-procedure vital signs: reviewed and stable  Pain management: adequate  Airway patency: patent    PONV status at discharge: No PONV  Anesthetic complications: no      Cardiovascular status: hypertensive and blood pressure returned to baseline  Respiratory status: spontaneous ventilation  Hydration status: euvolemic  Follow-up not needed.          Vitals Value Taken Time   /90 07/26/22 1807   Temp 36.7 °C (98 °F) 07/26/22 1600   Pulse 83 07/26/22 1808   Resp 16 07/26/22 1759   SpO2 100 % 07/26/22 1808   Vitals shown include unvalidated device data.      Event Time   Out of Recovery 07/26/2022 15:56:00         Pain/Lien Score: Pain Rating Prior to Med Admin: 5 (7/26/2022  5:49 PM)  Pain Rating Post Med Admin: 0 (7/26/2022  2:29 PM)  Lien Score: 8 (7/26/2022  3:42 PM)

## 2022-07-26 NOTE — CHAPLAIN
Visited pt again, she was being dialyzed; she was very tired and weak, but welcomed me into the room; she has 3 daughters-- ages 7, 13 and 14-- each staying with grandparents; pt continues to be hopeful and grateful for pulling through the medical scare last week; she remembers nothing of it; Provided compassionate presence and tender touch as she spoke softly; I put the prayer blanket back over her and she was appreciative. She was really tired, so I said I'd check on her later.  will continue to follow. Lord, in your mercy.

## 2022-07-26 NOTE — CHAPLAIN
"Nurse asked me to visit pt again because pt was weeping and praying aloud; immediately went to bedside again, held her hand and we prayed together; after the prayer she kept thanking God-- "Dena Gonzalez", "Harish" "Thank you Father!"... although she doesn't remember, she's been told by many, that last Friday was very scary she had been through so much. She is grateful to have survived. Pt appreciated the visit again and prayer.  will continue to follow. Lord, in your mercy.  "

## 2022-07-26 NOTE — TRANSFER OF CARE
"Anesthesia Transfer of Care Note    Patient: Tabby Howard    Procedure(s) Performed: Procedure(s) (LRB):  INSERTION-CATHETER-Hemosplit (N/A)    Patient location: PACU    Anesthesia Type: general    Transport from OR: Transported from OR on 6-10 L/min O2 by face mask with adequate spontaneous ventilation    Post pain: adequate analgesia    Post assessment: no apparent anesthetic complications and tolerated procedure well    Post vital signs: stable    Level of consciousness: sedated    Nausea/Vomiting: no nausea/vomiting    Complications: none    Transfer of care protocol was followed      Last vitals:   Visit Vitals  BP (!) 209/93   Pulse 91   Temp 36.9 °C (98.5 °F) (Axillary)   Resp (!) 42   Ht 5' 2" (1.575 m)   Wt 75.2 kg (165 lb 12.6 oz)   SpO2 98%   Breastfeeding No   BMI 30.32 kg/m²     "

## 2022-07-26 NOTE — PLAN OF CARE
Problem: Adult Inpatient Plan of Care  Goal: Plan of Care Review  Outcome: Ongoing, Progressing  Goal: Patient-Specific Goal (Individualized)  Outcome: Ongoing, Progressing  Goal: Absence of Hospital-Acquired Illness or Injury  Outcome: Ongoing, Progressing  Goal: Optimal Comfort and Wellbeing  Outcome: Ongoing, Progressing  Goal: Readiness for Transition of Care  Outcome: Ongoing, Progressing     Problem: Diabetes Comorbidity  Goal: Blood Glucose Level Within Targeted Range  Outcome: Ongoing, Progressing     Problem: Fluid and Electrolyte Imbalance (Acute Kidney Injury/Impairment)  Goal: Fluid and Electrolyte Balance  Outcome: Ongoing, Progressing     Problem: Oral Intake Inadequate (Acute Kidney Injury/Impairment)  Goal: Optimal Nutrition Intake  Outcome: Ongoing, Progressing     Problem: Renal Function Impairment (Acute Kidney Injury/Impairment)  Goal: Effective Renal Function  Outcome: Ongoing, Progressing     Problem: Infection  Goal: Absence of Infection Signs and Symptoms  Outcome: Ongoing, Progressing     Problem: Device-Related Complication Risk (Hemodialysis)  Goal: Safe, Effective Therapy Delivery  Outcome: Ongoing, Progressing     Problem: Hemodynamic Instability (Hemodialysis)  Goal: Effective Tissue Perfusion  Outcome: Ongoing, Progressing     Problem: Infection (Hemodialysis)  Goal: Absence of Infection Signs and Symptoms  Outcome: Ongoing, Progressing     Problem: Fluid Imbalance (Pneumonia)  Goal: Fluid Balance  Outcome: Ongoing, Progressing     Problem: Infection (Pneumonia)  Goal: Resolution of Infection Signs and Symptoms  Outcome: Ongoing, Progressing     Problem: Respiratory Compromise (Pneumonia)  Goal: Effective Oxygenation and Ventilation  Outcome: Ongoing, Progressing     Problem: Fall Injury Risk  Goal: Absence of Fall and Fall-Related Injury  Outcome: Ongoing, Progressing     Problem: ARDS (Acute Respiratory Distress Syndrome)  Goal: Effective Oxygenation  Outcome: Ongoing,  Progressing     Problem: Skin Injury Risk Increased  Goal: Skin Health and Integrity  Outcome: Ongoing, Progressing     Problem: Coping Ineffective  Goal: Effective Coping  Outcome: Ongoing, Progressing

## 2022-07-26 NOTE — PROGRESS NOTES
" INPATIENT NEPHROLOGY PROGRESS  [facility] NEPHROLOGY    Tabby Howard  07/26/2022    Reason for consultation:    ESRD    Chief Complaint:   Chief Complaint   Patient presents with    Abdominal Pain     Patient presented to pre-op for dialysis cath changed and was in extreme abdominal pain and was brought to the er           History of Present Illness:    Pt is a 33 yo woman with h/o ESRD on hemodialysis, diabetes, hypertension, anemia,  Supraventricular tachycardia, heart failure with preserved ejection fraction, sickle cell trait, and secondary hyperparathyroidism who was referred to the emergency room from her outpatient dialysis unit.  She experienced bleeding from her hemosplit catheter sight while undergoing dialysis.  I called the outpatient unit and they stated that she starting bleeding and her shirt was covered in blood and there was a puddle on the floor.  The bleeding stopped when hemodialysis was discontinued.  She isn't very cooperative with questioning.  She states she has nausea, shortness of breath and weakness. She denies chest pain, fever, neurologic deficits, or urinary or bowel complaints.      7/23  Had HD yesterday once line replaced.  3L UF, pt was put on pressors as SBP dropped into the 50's almost immediately.  She is intubated and sedated.  Addendum:  D/w Dr. Benitez--d/c'd HD orders, he would like to see pt first as she has been so unstable  7/24  Extubated this am.  Had HD yesterday, 1.5L UF, got a unit of blood.  Hgb dropped yesterday afternoon to 6.2, now 7.8 post transfusion.  Hypokalemic at 3.2 this am, has prn repletion orders.  Off pressors, has low dose sedation.  VSS.    7/25  Denies chest pain or sob.  No vomiting  7/26  Pressures high, has HD today--fluid removal should help, and has PRN labetalol orders.  SOB "comes and goes".  Dr. Miller at bedside, pt needs CTA.  OK from renal standpoint, pt w/ESRD.        Plan of Care:       Assessment:    ESRD  --t,th,sat hd  --fluid " restrict  --renal dose medication  --needs new hemosplit catheter prior to d/c     hyperglycemia  --per primary    Anemia due to acute blood loss and esrd  --heparin free hd  --epogen with hd  --transfuse with hd .  (goal hg in esrd patients is 10-12)    Pneumonia  --now on PO levaquin  --oxygen supplementation as necessary  --pulmonary following    Hypertension  --fluid restrict  --uf with hd  --low salt diet  --amlodipine, hydralazine, and atenolol resumed.  Labetalol ordered PRN    Hyponatremia  --improved after HD  --fluid restict when taking PO  --uf with hd    Metabolic acidosis  --35 bicarb bath, improved      Thank you for allowing us to participate in this patient's care. We will continue to follow.    Vital Signs:  Temp Readings from Last 3 Encounters:   22 98.8 °F (37.1 °C) (Axillary)   22 98.7 °F (37.1 °C) (Temporal)   22 98 °F (36.7 °C)       Pulse Readings from Last 3 Encounters:   22 86   22 91   22 96       BP Readings from Last 3 Encounters:   22 (!) 155/68   22 (!) 153/95   22 (!) 160/99       Weight:  Wt Readings from Last 3 Encounters:   22 75.2 kg (165 lb 12.6 oz)   22 74.8 kg (165 lb)   22 76.1 kg (167 lb 12.3 oz)       Past Medical & Surgical History:  Past Medical History:   Diagnosis Date    CKD (chronic kidney disease), stage IV 2022    Diabetes mellitus due to underlying condition with unspecified complications 2022    Gastroparesis 2022    Heart failure with preserved ejection fraction 2022    EF 55% on 3/22    History of gastroesophageal reflux (GERD)     History of supraventricular tachycardia     Hyperkalemia 2022    Hypertensive emergency 2022    Sickle cell trait 2022    Type 2 diabetes mellitus        Past Surgical History:   Procedure Laterality Date     SECTION      x 3    ESOPHAGOGASTRODUODENOSCOPY N/A 10/18/2019    Procedure: ESOPHAGOGASTRODUODENOSCOPY  (EGD);  Surgeon: Gianluca Mendez MD;  Location: [facility] ENDO;  Service: Endoscopy;  Laterality: N/A;    PLACEMENT OF DUAL-LUMEN VASCULAR CATHETER Left 2022    Procedure: INSERTION-CATHETER-JOSEPH;  Surgeon: Dionte Gan MD;  Location: [facility] OR;  Service: General;  Laterality: Left;       Past Social History:  Social History     Socioeconomic History    Marital status:    Tobacco Use    Smoking status: Never Smoker    Smokeless tobacco: Never Used   Substance and Sexual Activity    Alcohol use: No    Drug use: No    Sexual activity: Not Currently     Partners: Male     Birth control/protection: I.U.D.       Medications:  No current facility-administered medications on file prior to encounter.     Current Outpatient Medications on File Prior to Encounter   Medication Sig Dispense Refill    amLODIPine (NORVASC) 10 MG tablet Take 1 tablet (10 mg total) by mouth once daily. 30 tablet 11    atenoloL (TENORMIN) 50 MG tablet Take 1 tablet (50 mg total) by mouth every other day. 45 tablet 3    famotidine (PEPCID) 20 MG tablet Take 1 tablet (20 mg total) by mouth once daily. 30 tablet 0    FLUoxetine 20 MG capsule Take 1 capsule (20 mg total) by mouth once daily. 30 capsule 1    HYDROcodone-acetaminophen (NORCO) 5-325 mg per tablet Take 1 tablet by mouth every 6 (six) hours as needed for Pain. 20 tablet 0    insulin aspart U-100 (NOVOLOG) 100 unit/mL (3 mL) InPn pen Inject 1-10 Units into the skin before meals and at bedtime as needed (Hyperglycemia). 3 mL 3    isosorbide mononitrate (IMDUR) 60 MG 24 hr tablet Take 2 tablets (120 mg total) by mouth once daily. 30 tablet 1    LANTUS U-100 INSULIN 100 unit/mL injection SMARTSI Unit(s) SUB-Q Every Morning      levETIRAcetam (KEPPRA) 500 MG Tab Take 1 tablet (500 mg total) by mouth 2 (two) times daily. 60 tablet 1    polyethylene glycol (GLYCOLAX) 17 gram/dose powder Take 17 g by mouth 2 (two) times daily. 510 each 0    predniSONE (DELTASONE) 20  "MG tablet Take 3 tablets (60 mg total) by mouth once daily for 4 days, THEN 2 tablets (40 mg total) once daily for 4 days, THEN 1 tablet (20 mg total) once daily for 4 days, THEN 0.5 tablets (10 mg total) once daily for 4 days. 26 tablet 0    [DISCONTINUED] furosemide (LASIX) 40 MG tablet Take 1 tablet (40 mg total) by mouth 2 (two) times daily. 60 tablet 0    [DISCONTINUED] pantoprazole (PROTONIX) 40 MG tablet Take 1 tablet (40 mg total) by mouth once daily. 30 tablet 3    [DISCONTINUED] torsemide (DEMADEX) 20 MG Tab Take 1 tablet (20 mg total) by mouth 2 (two) times a day. (Patient taking differently: Take 20 mg by mouth once daily.) 60 tablet 1     Scheduled Meds:   amLODIPine  10 mg Oral Daily    atenoloL  50 mg Oral Every other day    cloNIDine 0.2 mg/24 hr td ptwk  1 patch Transdermal Q7 Days    enoxaparin  1 mg/kg Subcutaneous Daily    epoetin teresa-epbx  100 Units/kg Subcutaneous Every Tues, Thurs, Sat    famotidine  20 mg Oral Daily    FLUoxetine  20 mg Oral Daily    folic acid  1 mg Oral Daily    hydrALAZINE  50 mg Oral Q8H    isosorbide mononitrate  120 mg Oral Daily    levetiracetam IV  500 mg Intravenous Q12H    levoFLOXacin  500 mg Oral Every other day    mupirocin   Nasal BID    pantoprazole  40 mg Intravenous Daily    polyethylene glycol  17 g Oral BID    sodium chloride 0.9%  10 mL Intravenous Q8H     Continuous Infusions:    PRN Meds:.    Allergies:  Penicillins    Past Family History:  Reviewed; refer to Hospitalist Admission Note    Review of Systems:  Review of Systems - All 14 systems reviewed and negative, except as noted in HPI    Physical Exam:    BP (!) 155/68   Pulse 86   Temp 98.8 °F (37.1 °C) (Axillary)   Resp (!) 26   Ht 5' 2" (1.575 m)   Wt 75.2 kg (165 lb 12.6 oz)   SpO2 99%   Breastfeeding No   BMI 30.32 kg/m²     General Appearance:   ill appearing   Head:    Normocephalic, without obvious abnormality, atraumatic   Eyes:    PER, conjunctiva/corneas clear, " EOM's intact in both eyes        Throat:   Lips, mucosa, and tongue normal; teeth and gums normal   Back:     Symmetric, no curvature, ROM normal, no CVA tenderness   Lungs:     Right sided rhonchi   Chest wall:    No tenderness or deformity   Heart:    Regular rate and rhythm, S1 and S2 normal, no murmur, rub   or gallop   Abdomen:     Tender.  No rigidity or guarding.    Extremities:   Extremities normal, atraumatic, no cyanosis or edema   Pulses:   2+ and symmetric all extremities   MSK:   No joint or muscle swelling, tenderness or deformity   Skin:   Skin color, texture, turgor normal, no rashes or lesions   Neurologic:      No flap     Results:  Lab Results   Component Value Date     07/26/2022    K 4.1 07/26/2022     07/26/2022    CO2 23 07/26/2022    BUN 35 (H) 07/26/2022    CREATININE 4.0 (H) 07/26/2022    CALCIUM 7.4 (L) 07/26/2022    ANIONGAP 11 07/26/2022    ESTGFRAFRICA 16 (A) 07/26/2022    EGFRNONAA 14 (A) 07/26/2022       Lab Results   Component Value Date    CALCIUM 7.4 (L) 07/26/2022    PHOS 5.3 (H) 07/26/2022    PHOS 5.3 (H) 07/26/2022       Recent Labs   Lab 07/26/22  0341   WBC 11.58   RBC 2.69*   HGB 7.9*   HCT 23.4*   *   MCV 87   MCH 29.4   MCHC 33.8          I have personally reviewed pertinent radiological imaging and reports.    Patient care was time spent personally by me on the following activities:   · Obtaining a history  · Examination of patient.  · Providing medical care at the patients bedside.  · Developing a treatment plan with patient or surrogate and bedside caregivers  · Ordering and reviewing laboratory studies, radiographic studies, pulse oximetry.  · Ordering and performing treatments and interventions.  · Evaluation of patient's response to treatment.  · Discussions with consultants while on the unit and immediately available to the patient.  · Re-evaluation of the patient's condition.  · Documentation in the medical record.       Geeta Kaur,  NP    Nephrology  [facility] [place]  [phone]

## 2022-07-26 NOTE — ANESTHESIA PREPROCEDURE EVALUATION
07/26/2022  Tabby Howard is a 32 y.o., female.      Pre-op Assessment    I have reviewed the Patient Summary Reports.     I have reviewed the Nursing Notes. I have reviewed the NPO Status.   I have reviewed the Medications.     Review of Systems  Anesthesia Hx:  No problems with previous Anesthesia    Social:  Non-Smoker    Hematology/Oncology:         -- Anemia: Hematology Comments: Sickle cell trait, H/H 8/24, plt's 124k     Cardiovascular:   Hypertension, well controlled Dysrhythmias (SVT) CHF ECG has been reviewed. Pericardial effusion  CTA -   Impression:     Continued moderate pericardial effusion.  Mild cardiomegaly.  No CTA evidence of pulmonary embolus or aortic dissection.  Decreased right pleural effusion, patchy interstitial infiltrate in the lung fields bilaterally.   Pulmonary:   Pneumonia Pleural effusion   Renal/:   Chronic Renal Disease, ESRD, Dialysis, CRI    Hepatic/GI:   GERD    Neurological:   Seizures    Endocrine:   Diabetes Gucose 170mg/dl Obesity / BMI > 30, Morbid Obesity / BMI > 40      Physical Exam  General: Well nourished, Cooperative, Alert and Oriented    Airway:  Mallampati: I   Mouth Opening: Normal  Neck ROM: Normal ROM        Anesthesia Plan  Type of Anesthesia, risks & benefits discussed:    Anesthesia Type: Gen ETT, Gen Supraglottic Airway, Gen Natural Airway, MAC  Intra-op Monitoring Plan: Standard ASA Monitors  Post Op Pain Control Plan: multimodal analgesia  Induction:  IV  Airway Plan: Direct, Video and Fiberoptic, Post-Induction  Informed Consent: Informed consent signed with the Patient and all parties understand the risks and agree with anesthesia plan.  All questions answered.   ASA Score: 4  Anesthesia Plan Notes: Pt in ICU acutely hypoxic, in DKA and needing dialysis.  Plan emergency GETA, remain intubated and return to ICU.    Ready For Surgery From  Anesthesia Perspective.     .

## 2022-07-26 NOTE — CARE UPDATE
07/26/22 0758   Patient Assessment/Suction   Level of Consciousness (AVPU) alert   Respiratory Effort Normal;Unlabored   Expansion/Accessory Muscles/Retractions no use of accessory muscles;no retractions;expansion symmetric   All Lung Fields Breath Sounds clear;diminished   Cough Frequency infrequent   Cough Type nonproductive   PRE-TX-O2   O2 Device (Oxygen Therapy) nasal cannula w/ humidification   $ Is the patient on Low Flow Oxygen? Yes   Flow (L/min) 3   SpO2 99 %   Pulse Oximetry Type Continuous   $ Pulse Oximetry - Multiple Charge Pulse Oximetry - Multiple   Pulse 86   Resp (!) 26   Positioning HOB elevated 30 degrees   Incentive Spirometer   $ Incentive Spirometer Charges postop instruction;breath hold utilized   Incentive Spirometer Predicted Level (mL) 2150   Administration (IS) instruction provided, follow-up;proper technique demonstrated   Number of Repetitions (IS) 8   Level Incentive Spirometer (mL) 2000   Patient Tolerance (IS) good

## 2022-07-26 NOTE — ANESTHESIA PROCEDURE NOTES
Intubation    Date/Time: 7/26/2022 2:31 PM  Performed by: Nura Johnson CRNA  Authorized by: Nura Johnson CRNA     Intubation:     Attempted By:  CRNA    Difficult Airway Encountered?: No      Complications:  None    Airway Device:  Supraglottic airway/LMA    Airway Device Size:  3.0

## 2022-07-26 NOTE — CONSULTS
Consult noted; chart reviewed. Pt is a FULL CODE AAOx3. Was intubated 7/22 and extubated 7/24. Pt sitting up in bed in no acute distress. No family members present. Receiving dialysis.     Introduced palliative care as an extra layer of support for our patient and families dealing with chronic illnesses. Pt states she came from home; lives with her parents and her 6 y/o daughter. Has 3 children total. Pt reports she cannot work. Goes to HD on T,R,Sat. Reports frequent hospitalizations. Denies issues with managing her DM. Education information given on how to manage CHF at home and low sodium diet. Pt would like to fill out a POA today. Refuses need for Living Will. Pt states she is to remain a FULL CODE. Denies this being a traumatic experience for her. Patient goals remain aggressive. Continue with current plan of care.       Advance Care Planning     Date: 07/26/2022    [facility]  I engaged the patient in a conversation about advance care planning and we specifically addressed what the goals of care would be moving forward, in light of the patient's change in clinical status.  .  We explored the patient's values and preferences for future care.  The patient endorses that what is most important right now is to focus on remaining as independent as possible    Accordingly, we have decided that the best plan to meet the patient's goals includes continuing with treatment    I did not explain the role for hospice care at this stage of the patient's illness, including its ability to help the patient live with the best quality of life possible.  We will not be making a hospice referral.    I spent a total of 15 minutes engaging the patient in this advance care planning discussion.          Advance Care Planning     Date: 07/26/2022    Power of   I initiated the process of advance care planning today and explained the importance of this process to the patient.  I introduced the concept of advance directives to the  patient, as well. Then the patient received detailed information about the importance of designating a Health Care Power of  (HCPOA). She was also instructed to communicate with this person about their wishes for future healthcare, should she become sick and lose decision-making capacity. The patient has not previously appointed a HCPOA. After our discussion, the patient has decided to complete a HCPOA and has appointed her both parents, health care agent: Step-Mother Tanya Howard / Father Garcia Howard & health care agent number: [phone] / [phone] I spent a total time of 20 minutes discussing this issue with the patient.     HCPOA faxed to HIM department. PC Team will sign off. Thank you for consult

## 2022-07-26 NOTE — CARE UPDATE
07/25/22 1927   Patient Assessment/Suction   Level of Consciousness (AVPU) alert   PRE-TX-O2   O2 Device (Oxygen Therapy) nasal cannula w/ humidification   $ Is the patient on Low Flow Oxygen? Yes   Flow (L/min) 3   SpO2 99 %   Pulse Oximetry Type Continuous   Pulse 78   Resp 17   BP (!) 180/81   Incentive Spirometer   $ Incentive Spirometer Charges refused

## 2022-07-26 NOTE — PT/OT/SLP PROGRESS
Occupational Therapy      Patient Name:  Tabby Howard   MRN:  [id number]    Patient not seen today secondary to Dialysis. Will follow-up when appropriate.     7/26/2022

## 2022-07-26 NOTE — PLAN OF CARE
Released from [facility] when criteria met pain controlled skin w+d No nausea No emesis dsg dry intact aaox4 encouraged deep breaths + cough on own  Pt has all belongings  At pt bedside cxr complete bs is 150 finger stick 02 sat 99 on 3 liters rr 12-14 snoring at intervals dsg tegaderm intact to hemosplit cath on r chest wall

## 2022-07-26 NOTE — PROGRESS NOTES
07/26/2022      Admit Date: 7/22/2022  Tabby Howard  New Patient Consult    Chief Complaint   Patient presents with    Abdominal Pain     Patient presented to pre-op for dialysis cath changed and was in extreme abdominal pain and was brought to the er        History of Present Illness:   Not clear what transpired to ppt current picture.  Pt seen by Dr Gan yesterday with h/o tunneled dialysis line recently placed in left neck having profuse bleeding and needing replacement.    Pt intubated and sedated.  Pt only 5 ft 2 inches with 8 cc/kg 400 cc tidal vol.  Would need 300 cc for 6 cc/kg pbw.  Pt had severe resp distress failing high ox and needing intubation for dialysis line manipulation.  Pt evaluated during and after line placed.  Pt was schedule to have dialysis line changed presenting dka/rll pneumonia.  Chart reviewed and pt examined.    Progress Note  PULMONARY    Admit Date: 7/22/2022 07/26/2022      SUBJECTIVE:     July 25th-no complaints, feeling better.  No recall of what transpired.  7/26 no new c/o      PFSH and Allergies reviewed.    OBJECTIVE:     Vitals (Most recent):  Vitals:    07/26/22 0758   BP:    Pulse: 86   Resp: (!) 26   Temp:        Vitals (24 hour range):  Temp:  [98.5 °F (36.9 °C)-98.8 °F (37.1 °C)]   Pulse:  [75-87]   Resp:  [13-70]   BP: (127-227)/(61-95)   SpO2:  [92 %-100 %]       Intake/Output Summary (Last 24 hours) at 7/26/2022 0818  Last data filed at 7/25/2022 2000  Gross per 24 hour   Intake 1083.62 ml   Output --   Net 1083.62 ml          Physical Exam:  The patient's neuro status (alertness,orientation,cognitive function,motor skills,), pharyngeal exam (oral lesions, hygiene, abn dentition,), Neck (jvd,mass,thyroid,nodes in neck and above/below clavicle),RESPIRATORY(symmetry,effort,fremitus,percussion,auscultation),  Cor(rhythm,heart tones including gallops,perfusion,edema)ABD(distention,hepatic&splenomegaly,tenderness,masses),  Skin(rash,cyanosis),Psyc(affect,judgement,).  Exam negative except for these pertinent findings:    Alert, nasal cannula, clear chest, no distress, rest good.    Radiographs reviewed: view by direct vision    Results for orders placed during the hospital encounter of 07/22/22    X-Ray Chest 1 View    Narrative  EXAMINATION:  XR CHEST 1 VIEW    CLINICAL HISTORY:  ng placement;    TECHNIQUE:  Single frontal view of the chest was performed.    COMPARISON:  One-view for NG tube placement    FINDINGS:  NG tube is seen traversing the esophagus extending beneath the level of the diaphragms with the tip at the level of the body of the stomach    Endotracheal tube with the tip projecting approximately 1.7 cm above the brea    Right internal jugular venous catheter present with the tip at the level of the SVC    Other lines project over the chest    Cardiomediastinal silhouette stable.  Right lung infiltrate and small right pleural effusion not significantly changed.  Left lung base partially obscured with probable atelectasis left lung base along with mild left perihilar infiltrate    Impression  NG tube has been inserted with the tip at the level of the body of the stomach.  Tip of the endotracheal tube projects approximately 1.7 cm above the brea.  Bilateral lung infiltrates with right lung infiltrate not significantly changed or if anything slightly more confluent and with now mild left perihilar infiltrate along with left basilar opacification suggesting atelectasis      Electronically signed by: Sunshine Awan MD  Date:    07/22/2022  Time:    17:11  ]    Labs     Recent Labs   Lab 07/26/22  0341   WBC 11.58   HGB 7.9*   HCT 23.4*   *     Recent Labs   Lab 07/26/22  0341      K 4.1      CO2 23   BUN 35*   CREATININE 4.0*   *   CALCIUM 7.4*   MG 2.0   PHOS 5.3*  5.3*   ALBUMIN 1.6*   No results for input(s): PH, PCO2, PO2, HCO3 in the last 24 hours.  Microbiology Results (last 7 days)      Procedure Component Value Units Date/Time    Blood culture x two cultures. Draw prior to antibiotics. [[id number]] Collected: 07/22/22 1132    Order Status: Completed Specimen: Blood from Antecubital, Right Arm Updated: 07/26/22 0612     Blood Culture, Routine No Growth to date      No Growth to date      No Growth to date      No Growth to date    Narrative:      Aerobic and anaerobic    Blood culture x two cultures. Draw prior to antibiotics. [[id number]] Collected: 07/22/22 1112    Order Status: Completed Specimen: Blood from Antecubital, Right Arm Updated: 07/26/22 0612     Blood Culture, Routine No Growth to date      No Growth to date      No Growth to date      No Growth to date    Narrative:      Aerobic and anaerobic    Culture, MRSA [[id number]] Collected: 07/25/22 1445    Order Status: Sent Specimen: MRSA source from Nares, Left Updated: 07/26/22 0305    Culture, Respiratory with Gram Stain [[id number]] Collected: 07/22/22 1550    Order Status: Completed Specimen: Sputum, Expectorated Updated: 07/25/22 1120     Respiratory Culture No growth     Gram Stain (Respiratory) <10 epithelial cells per low power field.     Gram Stain (Respiratory) Rare WBC's     Gram Stain (Respiratory) No organisms seen          Impression:  Active Hospital Problems    Diagnosis  POA    *Acute hypoxemic respiratory failure [J96.01]  Yes    HCAP (healthcare-associated pneumonia), RLL [J18.9]  Yes    ESRD (end stage renal disease) [N18.6]  Yes    Hyperglycemia without ketosis [R73.9]  Yes    ARDS (adult respiratory distress syndrome) [J80]  Yes    Anemia [D64.9]  Yes    Seizure [R56.9]  Yes    Gastroparesis [K31.84]  Yes    Type II diabetes mellitus [E11.9]  Yes      Resolved Hospital Problems   No resolved problems to display.                  .      Plan: cxr diffuse haze with density rll medially >> left- c/w pneumonia and diffuse lung injury.    Pt desaturated post procedure with peep 12- peep 30 x 30 seconds ppt sat  rising from 83 on 100% to 100%.  Pt will be rxed for severe ards.     Sputum culture submitted.  Levaquin, cefepime, vanc dosed.      Versed/propofol/dilaudid ordered.     Levophed ordered.  Insulin drip ordered.       7/25/2022 no fever, vss, off abx, 3lpm,   Wbc 12, cxr 7/24 clearing pulm edema-would complete a course of antibiotic.  No change in plan.  Respiratory wise she looks very stable.  Exact diagnosis is not clear.  7/26 no fever, vss, levaquin 500 qod,   F/u cxr am.  Therapeutic lovenox since 24th, u/s with partial clot left ij and cephalic vv.  given severe resp failure would continue anticoagg for 3-6 months.  Would recommend cta to assure no pe seen-- she had pulm edema but resp failure was more difficult than expected.  She did have component of ards earlier.       Addendum cta no pe, study looked adequate.  Would go to oral anticoagg asap. Mobilize as needed.      Echo ok but cor very large and pulm artery very large c/w pulm htn.

## 2022-07-26 NOTE — OP NOTE
[facility]  Surgery Department  Operative Note    SUMMARY     Date of Procedure: 7/26/2022     Procedure: Procedure(s) (LRB):  INSERTION-CATHETER-Hemosplit (N/A)     Surgeon(s) and Role:     * Dionte Gan MD - Primary    Assisting Surgeon: None    Pre-Operative Diagnosis: ESRD (end stage renal disease) [N18.6]    Post-Operative Diagnosis: Post-Op Diagnosis Codes:     * ESRD (end stage renal disease) [N18.6]    Anesthesia:  General    Operative Findings (including complications, if any):  Right IJ HemoSplit line tunneled to the right chest wall    Description of Technical Procedures:  This is a 32-year-old female with end-stage renal disease.  Her previously placed HemoSplit line on the left side was malfunctioning.  She was initially admitted as an outpatient for exchange but decompensated and ended up in the ICU with acute respiratory failure.  She has since recovered and has been extubated.  She did consent to new tunneled line placement.    She was taken to the OR, placed supine, general endotracheal anesthesia was induced, the right neck and chest were prepped and draped in the usual fashion, a time-out was completed.  Using ultrasound, the right internal jugular vein was cannulated with a hollow bore needle.  A wire was advanced and confirmed to be in appropriate location using ultrasound and fluoroscopy.  A location on the right chest wall was chosen for the catheter exit site.  A skin nick was made at this location.  The catheter was tunneled from the chest wall to the wire exit site in the neck.  Using Seldinger technique and under fluoroscopic guidance, the wire was exchanged for a split sheath dilator without apparent complication.  The catheter was advanced down the sheath and the sheath was then removed.  Repeat fluoroscopy confirmed appropriate location of the catheter without kinking.  The catheter was accessed, aspirated, and then flushed with heparinized saline with ease.  It  was secured to the chest wall using a 2-0 nylon suture.  The wire exit site in the neck was closed with a single buried 4-0 Vicryl suture.  Dermabond was applied as a dressing.  Patient tolerated the entire procedure without apparent complication, was extubated in the OR, brought to recovery in stable condition.  All counts were correct.    Significant Surgical Tasks Conducted by the Assistant(s), if Applicable: n/a    Estimated Blood Loss (EBL)min           Implants:   Implant Name Type Inv. Item Serial No.  Lot No. LRB No. Used Action   KIT CATH HEMOSPLIT 14FR 23CM - [id number] Dialysis Catheter KIT CATH HEMOSPLIT 14FR 23CM  [employer] [id number] Right 1 Implanted       Specimens:   Specimen (24h ago, onward)            None                  Condition: Good    Disposition: PACU - hemodynamically stable.    Attestation: I was present and scrubbed for the entire procedure.

## 2022-07-26 NOTE — NURSING
Pt back from OR after hemosplit cath placement, tolerated well. PACU nurse at bedside to recover pt. Pt on O2 mask satting well. VSS.

## 2022-07-26 NOTE — PT/OT/SLP PROGRESS
Physical Therapy      Patient Name:  Tabby Howard   MRN:  [id number]    Patient not seen today secondary to Dialysis. Will follow-up 7/27/22.

## 2022-07-26 NOTE — PROGRESS NOTES
Consent and Hep b status verified, removed 3000uf net, lines reversed on catheter, arterial port sluggish, dressing CDI. Patient stable throughout treatment. Patient received 2 units of RBCs on HD. Patient educated on HD procedure and infection control. Report given to SHAILA Sue     07/26/22 1250   Handoff Report   Received From Eun   Given To Linette   Vital Signs   Temp 98.5 °F (36.9 °C)   Temp src Axillary   Heart Rate Source Monitor   Pulse Oximetry Type Continuous   Flow (L/min) 2   O2 Device (Oxygen Therapy) nasal cannula w/ humidification   BP (!) 187/93   BP Location Right leg   BP Method Automatic   Patient Position Lying   Assessments (Pre/Post)   Consent Obtained yes   Safety vein preservation armband present   Date Hepatitis Profile Obtained 07/07/22   Blood Liters Processed (BLP) 68.5   Transport Modality bed   Level of Consciousness (AVPU) alert   Dialyzer Clearance mildly streaked   Pain   Preferred Pain Scale number (Numeric Rating Pain Scale)   Comfort/Acceptable Pain Level 3   Pain Rating (0-10): Rest 0   Pain Rating (0-10): Activity 0   Pain Management Interventions pillow support provided;position adjusted;quiet environment facilitated   Pain/Comfort Interventions   Fever Reduction/Comfort Measures lightweight bedding;lightweight clothing   Pre-Hemodialysis Assessment   Additional Dialysis Information Needed Yes   Patient Status Departed   Treatment Consent Verified Yes   Treatment Status Completed   Trialysis (Dialysis) Catheter 07/22/22 1533 right internal jugular   Placement Date/Time: 07/22/22 1533   Hand Hygiene: Performed  Barrier Precautions: Performed  Skin Antisepsis: ChloraPrep  Location: right internal jugular  Insertion attempts (enter comment if more than 2 attempts): 1  Guidewire Removed?: Yes  Guidew...   Verification by X-ray Yes   Site Assessment No drainage;No redness;No swelling;No warmth   Line Securement Device Secured with sutures   Dressing Type Biopatch in  place;Central line dressing   Dressing Status Clean;Dry;Intact   Dressing Intervention Integrity maintained   Date on Dressing 07/22/22   Dressing Due to be Changed 07/29/22   Venous Patency/Care flushed w/o difficulty;heparin locked   Arterial Patency/Care flushed w/o difficulty;heparin locked   Line Necessity Review CRRT/HD   Post-Hemodialysis Assessment   Rinseback Volume (mL) 250 mL   Blood Volume Processed (Liters) 68.5 L   Dialyzer Clearance Lightly streaked   Additional Fluid Intake (mL) 1220 mL   Total UF (mL) 4220 mL   Net Fluid Removal 3000   Patient Response to Treatment lamar   Post-Treatment Weight 72.1 kg (158 lb 15.2 oz)   Treatment Weight Change -3.1   Post-Hemodialysis Comments stable   Edema   Edema dependent;generalized

## 2022-07-26 NOTE — PLAN OF CARE
POC reviewed w/ pt. Demonstrated understanding. Supplemental O2 at 2L NC. Continues to endorse abdominal pain throughout shift - PRN pain medicine given as ordered. CTA done this morning for assessment of PE - no evidence of PE found on scan. Pt dialyzed today -3L removed, 2units PRBCs given during dialysis, pt tolerated well. Pt went to OR w/ gen surgery for hemosplit HD cath placement today - right chest wall placement, verified by CXR, tolerated well, pt recovering from surgery well. BP an issue again today - PRN's given as ordered, PO hydralazine dose increased to 100mg. Accuchecks WNL. Safety measures in place.    Problem: Adult Inpatient Plan of Care  Goal: Plan of Care Review  Outcome: Ongoing, Progressing  Goal: Patient-Specific Goal (Individualized)  Outcome: Ongoing, Progressing  Goal: Absence of Hospital-Acquired Illness or Injury  Outcome: Ongoing, Progressing     Problem: Diabetes Comorbidity  Goal: Blood Glucose Level Within Targeted Range  Outcome: Ongoing, Progressing     Problem: Fluid and Electrolyte Imbalance (Acute Kidney Injury/Impairment)  Goal: Fluid and Electrolyte Balance  Outcome: Ongoing, Progressing     Problem: Oral Intake Inadequate (Acute Kidney Injury/Impairment)  Goal: Optimal Nutrition Intake  Outcome: Ongoing, Progressing     Problem: Renal Function Impairment (Acute Kidney Injury/Impairment)  Goal: Effective Renal Function  Outcome: Ongoing, Progressing     Problem: Fluid Imbalance (Pneumonia)  Goal: Fluid Balance  Outcome: Ongoing, Progressing     Problem: Fall Injury Risk  Goal: Absence of Fall and Fall-Related Injury  Outcome: Ongoing, Progressing     Problem: Skin Injury Risk Increased  Goal: Skin Health and Integrity  Outcome: Ongoing, Progressing     Problem: Coping Ineffective  Goal: Effective Coping  Outcome: Ongoing, Progressing

## 2022-07-27 VITALS
HEART RATE: 81 BPM | WEIGHT: 165.81 LBS | OXYGEN SATURATION: 98 % | DIASTOLIC BLOOD PRESSURE: 72 MMHG | TEMPERATURE: 99 F | BODY MASS INDEX: 30.51 KG/M2 | RESPIRATION RATE: 14 BRPM | SYSTOLIC BLOOD PRESSURE: 146 MMHG | HEIGHT: 62 IN

## 2022-07-27 LAB
MRSA SPEC QL CULT: NORMAL
POCT GLUCOSE: 213 MG/DL (ref 70–110)

## 2022-07-27 PROCEDURE — 63600175 PHARM REV CODE 636 W HCPCS: Performed by: INTERNAL MEDICINE

## 2022-07-27 PROCEDURE — 25000003 PHARM REV CODE 250: Performed by: INTERNAL MEDICINE

## 2022-07-27 PROCEDURE — 94799 UNLISTED PULMONARY SVC/PX: CPT

## 2022-07-27 PROCEDURE — 94761 N-INVAS EAR/PLS OXIMETRY MLT: CPT

## 2022-07-27 PROCEDURE — A4216 STERILE WATER/SALINE, 10 ML: HCPCS | Performed by: INTERNAL MEDICINE

## 2022-07-27 PROCEDURE — 25000003 PHARM REV CODE 250: Performed by: ANESTHESIOLOGY

## 2022-07-27 PROCEDURE — C9113 INJ PANTOPRAZOLE SODIUM, VIA: HCPCS | Performed by: INTERNAL MEDICINE

## 2022-07-27 RX ORDER — CLONIDINE 0.2 MG/24H
1 PATCH, EXTENDED RELEASE TRANSDERMAL
Qty: 4 PATCH | Refills: 2 | Status: ON HOLD | OUTPATIENT
Start: 2022-07-31 | End: 2022-11-15 | Stop reason: SDUPTHER

## 2022-07-27 RX ORDER — LEVOFLOXACIN 500 MG/1
500 TABLET, FILM COATED ORAL EVERY OTHER DAY
Qty: 1 TABLET | Refills: 0 | Status: ON HOLD | OUTPATIENT
Start: 2022-07-29 | End: 2022-08-02 | Stop reason: HOSPADM

## 2022-07-27 RX ORDER — CARVEDILOL 25 MG/1
25 TABLET ORAL 2 TIMES DAILY
Qty: 60 TABLET | Refills: 2 | Status: ON HOLD | OUTPATIENT
Start: 2022-07-27 | End: 2022-11-15

## 2022-07-27 RX ORDER — HYDRALAZINE HYDROCHLORIDE 100 MG/1
100 TABLET, FILM COATED ORAL EVERY 8 HOURS
Qty: 90 TABLET | Refills: 2 | Status: ON HOLD | OUTPATIENT
Start: 2022-07-27 | End: 2022-11-15

## 2022-07-27 RX ORDER — FUROSEMIDE 40 MG/1
40 TABLET ORAL 2 TIMES DAILY
Qty: 60 TABLET | Refills: 0 | Status: ON HOLD
Start: 2022-07-27 | End: 2022-11-15 | Stop reason: HOSPADM

## 2022-07-27 RX ADMIN — OXYCODONE 5 MG: 5 TABLET ORAL at 05:07

## 2022-07-27 RX ADMIN — FAMOTIDINE 20 MG: 20 TABLET, FILM COATED ORAL at 08:07

## 2022-07-27 RX ADMIN — LEVETIRACETAM 500 MG: 100 INJECTION, SOLUTION INTRAVENOUS at 08:07

## 2022-07-27 RX ADMIN — FOLIC ACID 1 MG: 1 TABLET ORAL at 08:07

## 2022-07-27 RX ADMIN — HYDROMORPHONE HYDROCHLORIDE 2 MG: 2 INJECTION, SOLUTION INTRAMUSCULAR; INTRAVENOUS; SUBCUTANEOUS at 06:07

## 2022-07-27 RX ADMIN — HYDRALAZINE HYDROCHLORIDE 100 MG: 25 TABLET, FILM COATED ORAL at 05:07

## 2022-07-27 RX ADMIN — AMLODIPINE BESYLATE 10 MG: 5 TABLET ORAL at 09:07

## 2022-07-27 RX ADMIN — ISOSORBIDE MONONITRATE 120 MG: 60 TABLET, EXTENDED RELEASE ORAL at 08:07

## 2022-07-27 RX ADMIN — OXYCODONE 5 MG: 5 TABLET ORAL at 12:07

## 2022-07-27 RX ADMIN — FLUOXETINE 20 MG: 20 CAPSULE ORAL at 08:07

## 2022-07-27 RX ADMIN — PANTOPRAZOLE SODIUM 40 MG: 40 INJECTION, POWDER, FOR SOLUTION INTRAVENOUS at 08:07

## 2022-07-27 RX ADMIN — MUPIROCIN: 20 OINTMENT TOPICAL at 08:07

## 2022-07-27 RX ADMIN — CARVEDILOL 25 MG: 25 TABLET, FILM COATED ORAL at 08:07

## 2022-07-27 RX ADMIN — Medication 10 ML: at 05:07

## 2022-07-27 RX ADMIN — INSULIN ASPART 2 UNITS: 100 INJECTION, SOLUTION INTRAVENOUS; SUBCUTANEOUS at 07:07

## 2022-07-27 RX ADMIN — LEVOFLOXACIN 500 MG: 500 TABLET, FILM COATED ORAL at 08:07

## 2022-07-27 NOTE — NURSING
1225: Pt transferred to family car via wheelchair. VSS upon dc, AVS/RX reviewed. Verbalizes understanding. Dressing to right dialysis catheter changed upon dc. Midline removed.     1305: Pt called to be made aware that CM will be calling to schedule her cardiology appt - needs to have phone on. Also notified to  starter pack of eliquis from [facility] pharmacy as [facility] not able to fill RX. Verbalizes understanding.

## 2022-07-27 NOTE — PLAN OF CARE
Patient cleared for discharge from case management standpoint.    SW spoke with patient regarding, medications, Cardiology referral and hospital follow up appt.  Patient verbalized understanding.     07/27/22 1348   Final Note   Assessment Type Final Discharge Note   Anticipated Discharge Disposition Home   Hospital Resources/Appts/Education Provided Appointments scheduled and added to AVS;Provided patient/caregiver with written discharge plan information;Provided education on problems/symptoms using teachback

## 2022-07-27 NOTE — DISCHARGE SUMMARY
[facility] Hospital Medicine  Discharge Summary      Patient Name: Tabby Howard  MRN: [id number]  Admission Date: 7/22/2022  Hospital Length of Stay: 5 days  Discharge Date and Time:  07/27/2022 1:55 PM  Attending Physician: Qing Breen MD   Discharging Provider: Qing Breen MD  Primary Care Provider: Primary Doctor No        HPI:     Patient is a 32-year-old  female with past medical history significant for sickle cell anemia, end-stage renal disease (on hemodialysis every Tuesday/Thursday/Saturday), diabetes mellitus type 2, gastroparesis and hypertension who is being admitted to Hospital Medicine under inpatient status from [facility] Emergency Room where patient presented with worsening shortness of Breath started this morning.  Patient was scheduled to undergo removal of malfunctioning Apolinar catheter.  Patient reported compliance with dialysis therapy, patient did receive hemodialysis yesterday.  Patient has been coughing up purulent expectoration.  Denies any associated fevers or chills.  At present, patient is in significant respiratory distress and not able to provide further meaningful history of present illness.  Patient has 100% non-rebreather placed.  Patient denies any chest pain or palpitations.  Patient is reporting frequent nausea and abdominal pain.  Patient's blood sugars noted to be in excess of 600 mg%.    Procedure(s) (LRB):  INSERTION-CATHETER-Hemosplit (Right)      Hospital Course:     Patient was admitted to intensive care unit for acute hypoxemic respiratory failure 2/2 pneumonia requiring intubation.  Broad-spectrum antibiotics were initiated as were pressors.  Ultimately the patient's respiratory status improved since to the she could be extubated.  By the time discharge she was breathing comfortably on room air with adequate saturation.  She will be discharged on levofloxacin to complete her course of therapy.    She  "was identified as having a thrombus associated with her dialysis catheter on the left side.  Anticoagulation was initiated.  Line was removed and she has had a new PermCath placed on the right side.  She will be discharged on Eliquis.    Regarding her hypertension, carvedilol was added as well as hydralazine.  Referral to Cardiology made for continued management.         * Acute hypoxemic respiratory failure  2/2 acute bacterial pneumonia 2/2 unknown organism      HTN  -adding coreg 25 bid and increasing hydralazine to 100 tid   -as above     ARDS (adult respiratory distress syndrome)  Follow Pulmonary recommendations.        Hyperglycemia without ketosis  Resume home regimen           ESRD (end stage renal disease)  Hemodialysis catheter was replaced by Dr. Gan; permacath now placed on 7/26 Hemodialysis therapy as per Nephrology team.        HCAP (healthcare-associated pneumonia), RLL  As above.  See "acute hypoxemic respiratory failure" management.        Anemia  Anemia of chronic disease and sickle cell anemia.  Patient received 1 unit of packed red blood cells for symptomatic anemia on July 23, 2022.   Monitor serial CBC and transfuse if patient becomes hemodynamically unstable, symptomatic or H/H drops below 7/21.  -To be transfused on 7/26 with HD; no evidence of active bleeding.           Seizure  Continue antiseizure medications and follow seizure precautions.        Gastroparesis  Supportive care with antiemetics and intravenous narcotics for pain control.        Type II diabetes mellitus  Patient's FSGs are uncontrolled due to hyperglycemia on current medication regimen.  Last A1c reviewed-         Lab Results   Component Value Date     HGBA1C 5.8 (H) 05/15/2022      Most recent fingerstick glucose reviewed-         Recent Labs   Lab 07/22/22  1313 07/22/22  1317 07/22/22  1427   POCTGLUCOSE >500* >500* >500*      Current correctional scale  High, starting IV Insulin infusion  Increase " "anti-hyperglycemic dose as follows-              Antihyperglycemics (From admission, onward)                            Start     Stop Route Frequency Ordered     07/22/22 1600   insulin regular 1 Units/mL in sodium chloride 0.9% 100 mL infusion        Question Answer Comment   Insulin Rate Adjustment (DO NOT MODIFY ANSWER) [email or web address]     Enter initial dose from Infusion Protocol Chart (Units/hr): 5         -- IV Continuous 07/22/22 1448     07/22/22 1434   insulin aspart U-100 pen 0-5 Units         -- SubQ Before meals & nightly PRN 07/22/22 1434     07/22/22 1345   insulin regular injection 20 Units 0.2 mL         07/23 0144 IV ED 1 Time 07/22/22 1334          Hold Oral hypoglycemics while patient is in the hospital.          Consults:   Consults (From admission, onward)        Status Ordering Provider     Inpatient consult to Palliative Care  Once        Provider:  Calvin Caballero MD    Completed JAMES GODFREY     Case Management/  Once        Provider:  (Not yet assigned)    Completed SULTAN, AQIB     Inpatient consult to Pulmonology  Once        Provider:  Temo Miller MD    Completed LUISA LUNA     Pharmacy to dose Vancomycin consult  Once        Provider:  (Not yet assigned)   "And" Linked Group Details    Completed SULTAN, AQIB     Inpatient consult to Nephrology  Once        Provider:  Prateek Clark MD    Acknowledged SULTAN, AQIB     Inpatient consult to Midline team  Once        Provider:  (Not yet assigned)    Completed PATRICIA LARKIN          Final Active Diagnoses:    Diagnosis Date Noted POA    PRINCIPAL PROBLEM:  Acute hypoxemic respiratory failure [J96.01] 07/22/2022 Yes    Acute deep vein thrombosis (DVT) of non-extremity vein [I82.90] 07/26/2022 Yes    HCAP (healthcare-associated pneumonia), RLL [J18.9] 07/22/2022 Yes    ESRD (end stage renal disease) [N18.6] 07/22/2022 Yes    Hyperglycemia " without ketosis [R73.9] 07/22/2022 Yes    Anemia [D64.9] 07/07/2022 Yes    Seizure [R56.9] 05/29/2022 Yes    Gastroparesis [K31.84] 04/12/2022 Yes    Type II diabetes mellitus [E11.9] 10/16/2019 Yes      Problems Resolved During this Admission:    Diagnosis Date Noted Date Resolved POA    ARDS (adult respiratory distress syndrome) [J80] 07/22/2022 07/26/2022 Yes      Discharged Condition: stable    Disposition: Home or Self Care    Follow Up:   Follow-up Information     [facility] Follow up.    Why: virtual appt  Aug 5, 2022 @ 10:00am  Contact information:  [address]  [place] [id number]  [phone]                       Patient Instructions:      Ambulatory referral/consult to Cardiology   Standing Status: Future   Referral Priority: Routine Referral Type: Consultation   Referral Reason: Specialty Services Required   Requested Specialty: Cardiology   Number of Visits Requested: 1     Medications:  Reconciled Home Medications:      Medication List      START taking these medications    * apixaban 5 mg Tab  Commonly known as: ELIQUIS  TAKE 2 TABLETS BY MOUTH TWICE DAILY FOR 7 DAYS THEN 1 TABLET TWICE DAILY THERE AFTER     * apixaban 5 mg Tab  Commonly known as: ELIQUIS  Take 1 tablet (5 mg total) by mouth 2 (two) times daily. For second month on.  Start taking on: August 24, 2022     carvediloL 25 MG tablet  Commonly known as: COREG  Take 1 tablet (25 mg total) by mouth 2 (two) times daily.     cloNIDine 0.2 mg/24 hr td ptwk 0.2 mg/24 hr  Commonly known as: CATAPRES  Place 1 patch onto the skin every 7 days.  Start taking on: July 31, 2022     hydrALAZINE 100 MG tablet  Commonly known as: APRESOLINE  Take 1 tablet (100 mg total) by mouth every 8 (eight) hours.     levoFLOXacin 500 MG tablet  Commonly known as: LEVAQUIN  Take 1 tablet (500 mg total) by mouth every other day. for 1 day  Start taking on: July 29, 2022         * This list has 2 medication(s) that are the  same as other medications prescribed for you. Read the directions carefully, and ask your doctor or other care provider to review them with you.            CONTINUE taking these medications    amLODIPine 10 MG tablet  Commonly known as: NORVASC  Take 1 tablet (10 mg total) by mouth once daily.     famotidine 20 MG tablet  Commonly known as: PEPCID  Take 1 tablet (20 mg total) by mouth once daily.     FLUoxetine 20 MG capsule  Take 1 capsule (20 mg total) by mouth once daily.     furosemide 40 MG tablet  Commonly known as: LASIX  Take 1 tablet (40 mg total) by mouth 2 (two) times daily.     HYDROcodone-acetaminophen 5-325 mg per tablet  Commonly known as: NORCO  Take 1 tablet by mouth every 6 (six) hours as needed for Pain.     insulin aspart U-100 100 unit/mL (3 mL) Inpn pen  Commonly known as: NovoLOG  Inject 1-10 Units into the skin before meals and at bedtime as needed (Hyperglycemia).     isosorbide mononitrate 60 MG 24 hr tablet  Commonly known as: IMDUR  Take 2 tablets (120 mg total) by mouth once daily.     LANTUS U-100 INSULIN 100 unit/mL injection  Generic drug: insulin glargine  SMARTSI Unit(s) SUB-Q Every Morning     levETIRAcetam 500 MG Tab  Commonly known as: KEPPRA  Take 1 tablet (500 mg total) by mouth 2 (two) times daily.     polyethylene glycol 17 gram/dose powder  Commonly known as: GLYCOLAX  Take 17 g by mouth 2 (two) times daily.     predniSONE 20 MG tablet  Commonly known as: DELTASONE  Take 3 tablets (60 mg total) by mouth once daily for 4 days, THEN 2 tablets (40 mg total) once daily for 4 days, THEN 1 tablet (20 mg total) once daily for 4 days, THEN 0.5 tablets (10 mg total) once daily for 4 days.  Start taking on: July 15, 2022        STOP taking these medications    atenoloL 50 MG tablet  Commonly known as: TENORMIN     pantoprazole 40 MG tablet  Commonly known as: PROTONIX     torsemide 20 MG Tab  Commonly known as: DEMADEX                Pending Diagnostic Studies:     Procedure  Component Value Units Date/Time    Basic Metabolic Panel [[id number]] Collected: 07/27/22 0527    Order Status: Sent Lab Status: In process Updated: 07/27/22 0527    Specimen: Blood     CBC Auto Differential [[id number]] Collected: 07/27/22 0527    Order Status: Sent Lab Status: In process Updated: 07/27/22 0527    Specimen: Blood     Magnesium [[id number]] Collected: 07/27/22 0527    Order Status: Sent Lab Status: In process Updated: 07/27/22 0527    Specimen: Blood     Phosphorus [[id number]] Collected: 07/27/22 0527    Order Status: Sent Lab Status: In process Updated: 07/27/22 0527    Specimen: Blood         Indwelling Lines/Drains at time of discharge:   Lines/Drains/Airways     Central Venous Catheter Line  Duration                Hemodialysis Catheter 07/26/22 1457 right internal jugular <1 day                Time spent on the discharge of patient: 35 minutes        Qing Breen MD  Department of Hospital Medicine  [facility]

## 2022-07-27 NOTE — PLAN OF CARE
Problem: Adult Inpatient Plan of Care  Goal: Plan of Care Review  Outcome: Ongoing, Progressing  Goal: Patient-Specific Goal (Individualized)  Outcome: Ongoing, Progressing  Goal: Absence of Hospital-Acquired Illness or Injury  Outcome: Ongoing, Progressing  Goal: Optimal Comfort and Wellbeing  Outcome: Ongoing, Progressing  Goal: Readiness for Transition of Care  Outcome: Ongoing, Progressing     Problem: Diabetes Comorbidity  Goal: Blood Glucose Level Within Targeted Range  Outcome: Ongoing, Progressing     Problem: Fluid and Electrolyte Imbalance (Acute Kidney Injury/Impairment)  Goal: Fluid and Electrolyte Balance  Outcome: Ongoing, Progressing     Problem: Oral Intake Inadequate (Acute Kidney Injury/Impairment)  Goal: Optimal Nutrition Intake  Outcome: Ongoing, Progressing     Problem: Renal Function Impairment (Acute Kidney Injury/Impairment)  Goal: Effective Renal Function  Outcome: Ongoing, Progressing     Problem: Infection  Goal: Absence of Infection Signs and Symptoms  Outcome: Ongoing, Progressing     Problem: Device-Related Complication Risk (Hemodialysis)  Goal: Safe, Effective Therapy Delivery  Outcome: Ongoing, Progressing     Problem: Hemodynamic Instability (Hemodialysis)  Goal: Effective Tissue Perfusion  Outcome: Ongoing, Progressing     Problem: Infection (Hemodialysis)  Goal: Absence of Infection Signs and Symptoms  Outcome: Ongoing, Progressing     Problem: Fluid Imbalance (Pneumonia)  Goal: Fluid Balance  Outcome: Ongoing, Progressing     Problem: Infection (Pneumonia)  Goal: Resolution of Infection Signs and Symptoms  Outcome: Ongoing, Progressing     Problem: Respiratory Compromise (Pneumonia)  Goal: Effective Oxygenation and Ventilation  Outcome: Ongoing, Progressing     Problem: Fall Injury Risk  Goal: Absence of Fall and Fall-Related Injury  Outcome: Ongoing, Progressing

## 2022-07-27 NOTE — PT/OT/SLP PROGRESS
Occupational Therapy      Patient Name:  Tabby Howard   MRN:  [id number]    Attempted OT tx. Patient refused due to fatigue. Nurse notified. Will re-attempt when appropriate.    7/27/2022

## 2022-07-27 NOTE — CARE UPDATE
07/26/22 1909   Patient Assessment/Suction   Level of Consciousness (AVPU) alert   Respiratory Effort Unlabored   PRE-TX-O2   O2 Device (Oxygen Therapy) room air   $ Is the patient on Low Flow Oxygen? Yes  (in bed with pt not wanting to put on)   Pulse Oximetry Type Continuous   $ Pulse Oximetry - Multiple Charge Pulse Oximetry - Multiple   Incentive Spirometer   $ Incentive Spirometer Charges done with encouragement   Administration (IS) proper technique demonstrated   Number of Repetitions (IS) 8   Level Incentive Spirometer (mL) 2000   Patient Tolerance (IS) good

## 2022-07-28 ENCOUNTER — PATIENT OUTREACH (OUTPATIENT)
Dept: ADMINISTRATIVE | Facility: CLINIC | Age: 32
End: 2022-07-28
Payer: MEDICAID

## 2022-07-28 ENCOUNTER — HOSPITAL ENCOUNTER (INPATIENT)
Facility: HOSPITAL | Age: 32
LOS: 5 days | Discharge: HOME | DRG: 417 | End: 2022-08-02
Attending: EMERGENCY MEDICINE | Admitting: INTERNAL MEDICINE
Payer: MEDICAID

## 2022-07-28 DIAGNOSIS — R11.2 NAUSEA AND VOMITING IN ADULT: ICD-10-CM

## 2022-07-28 DIAGNOSIS — R10.13 EPIGASTRIC PAIN: ICD-10-CM

## 2022-07-28 DIAGNOSIS — R74.01 TRANSAMINITIS: Primary | ICD-10-CM

## 2022-07-28 DIAGNOSIS — K81.0 ACUTE CHOLECYSTITIS: ICD-10-CM

## 2022-07-28 DIAGNOSIS — K80.20 CHOLELITHIASIS WITHOUT CHOLANGITIS: ICD-10-CM

## 2022-07-28 LAB
ALBUMIN SERPL BCP-MCNC: 2.2 G/DL (ref 3.5–5.2)
ALP SERPL-CCNC: 434 U/L (ref 55–135)
ALT SERPL W/O P-5'-P-CCNC: 353 U/L (ref 10–44)
ANION GAP SERPL CALC-SCNC: 16 MMOL/L (ref 8–16)
APAP SERPL-MCNC: <3 UG/ML (ref 10–20)
APTT BLDCRRT: 29.3 SEC (ref 21–32)
AST SERPL-CCNC: 462 U/L (ref 10–40)
BACTERIA BLD CULT: NORMAL
BACTERIA BLD CULT: NORMAL
BASOPHILS # BLD AUTO: 0.04 K/UL (ref 0–0.2)
BASOPHILS NFR BLD: 0.3 % (ref 0–1.9)
BILIRUB SERPL-MCNC: 1.3 MG/DL (ref 0.1–1)
BUN SERPL-MCNC: 31 MG/DL (ref 6–20)
CALCIUM SERPL-MCNC: 8.5 MG/DL (ref 8.7–10.5)
CHLORIDE SERPL-SCNC: 101 MMOL/L (ref 95–110)
CO2 SERPL-SCNC: 18 MMOL/L (ref 23–29)
CREAT SERPL-MCNC: 4.2 MG/DL (ref 0.5–1.4)
DIFFERENTIAL METHOD: ABNORMAL
EOSINOPHIL # BLD AUTO: 0 K/UL (ref 0–0.5)
EOSINOPHIL NFR BLD: 0.2 % (ref 0–8)
ERYTHROCYTE [DISTWIDTH] IN BLOOD BY AUTOMATED COUNT: 14.5 % (ref 11.5–14.5)
EST. GFR  (AFRICAN AMERICAN): 15 ML/MIN/1.73 M^2
EST. GFR  (NON AFRICAN AMERICAN): 13 ML/MIN/1.73 M^2
GLUCOSE SERPL-MCNC: 290 MG/DL (ref 70–110)
HCG SERPL QL: NEGATIVE
HCT VFR BLD AUTO: 30.1 % (ref 37–48.5)
HGB BLD-MCNC: 10.4 G/DL (ref 12–16)
IMM GRANULOCYTES # BLD AUTO: 0.34 K/UL (ref 0–0.04)
IMM GRANULOCYTES NFR BLD AUTO: 2.8 % (ref 0–0.5)
INR PPP: 1 (ref 0.8–1.2)
LACTATE SERPL-SCNC: 0.6 MMOL/L (ref 0.5–2.2)
LIPASE SERPL-CCNC: 17 U/L (ref 4–60)
LYMPHOCYTES # BLD AUTO: 0.5 K/UL (ref 1–4.8)
LYMPHOCYTES NFR BLD: 3.9 % (ref 18–48)
MCH RBC QN AUTO: 29.5 PG (ref 27–31)
MCHC RBC AUTO-ENTMCNC: 34.6 G/DL (ref 32–36)
MCV RBC AUTO: 86 FL (ref 82–98)
MONOCYTES # BLD AUTO: 0.5 K/UL (ref 0.3–1)
MONOCYTES NFR BLD: 4 % (ref 4–15)
NEUTROPHILS # BLD AUTO: 10.8 K/UL (ref 1.8–7.7)
NEUTROPHILS NFR BLD: 88.8 % (ref 38–73)
NRBC BLD-RTO: 0 /100 WBC
PLATELET # BLD AUTO: 125 K/UL (ref 150–450)
PMV BLD AUTO: 10.8 FL (ref 9.2–12.9)
POCT GLUCOSE: 280 MG/DL (ref 70–110)
POTASSIUM SERPL-SCNC: 4.3 MMOL/L (ref 3.5–5.1)
PROT SERPL-MCNC: 5.7 G/DL (ref 6–8.4)
PROTHROMBIN TIME: 10.4 SEC (ref 9–12.5)
RBC # BLD AUTO: 3.52 M/UL (ref 4–5.4)
SALICYLATES SERPL-MCNC: <5 MG/DL (ref 15–30)
SARS-COV-2 RDRP RESP QL NAA+PROBE: NEGATIVE
SODIUM SERPL-SCNC: 135 MMOL/L (ref 136–145)
TROPONIN I SERPL DL<=0.01 NG/ML-MCNC: 0.09 NG/ML (ref 0–0.03)
TROPONIN I SERPL DL<=0.01 NG/ML-MCNC: 0.16 NG/ML (ref 0–0.03)
WBC # BLD AUTO: 12.16 K/UL (ref 3.9–12.7)

## 2022-07-28 PROCEDURE — G0378 HOSPITAL OBSERVATION PER HR: HCPCS

## 2022-07-28 PROCEDURE — 80053 COMPREHEN METABOLIC PANEL: CPT | Performed by: EMERGENCY MEDICINE

## 2022-07-28 PROCEDURE — S0030 INJECTION, METRONIDAZOLE: HCPCS | Performed by: INTERNAL MEDICINE

## 2022-07-28 PROCEDURE — 84484 ASSAY OF TROPONIN QUANT: CPT | Performed by: EMERGENCY MEDICINE

## 2022-07-28 PROCEDURE — 96374 THER/PROPH/DIAG INJ IV PUSH: CPT

## 2022-07-28 PROCEDURE — 80143 DRUG ASSAY ACETAMINOPHEN: CPT | Performed by: INTERNAL MEDICINE

## 2022-07-28 PROCEDURE — 99285 EMERGENCY DEPT VISIT HI MDM: CPT | Mod: 25

## 2022-07-28 PROCEDURE — 85025 COMPLETE CBC W/AUTO DIFF WBC: CPT | Performed by: EMERGENCY MEDICINE

## 2022-07-28 PROCEDURE — 93005 ELECTROCARDIOGRAM TRACING: CPT

## 2022-07-28 PROCEDURE — 80074 ACUTE HEPATITIS PANEL: CPT | Performed by: INTERNAL MEDICINE

## 2022-07-28 PROCEDURE — U0002 COVID-19 LAB TEST NON-CDC: HCPCS | Performed by: INTERNAL MEDICINE

## 2022-07-28 PROCEDURE — 12000002 HC ACUTE/MED SURGE SEMI-PRIVATE ROOM

## 2022-07-28 PROCEDURE — 84703 CHORIONIC GONADOTROPIN ASSAY: CPT | Performed by: EMERGENCY MEDICINE

## 2022-07-28 PROCEDURE — 63600175 PHARM REV CODE 636 W HCPCS: Performed by: EMERGENCY MEDICINE

## 2022-07-28 PROCEDURE — 63600175 PHARM REV CODE 636 W HCPCS: Performed by: INTERNAL MEDICINE

## 2022-07-28 PROCEDURE — 93010 ELECTROCARDIOGRAM REPORT: CPT | Mod: ,,, | Performed by: SPECIALIST

## 2022-07-28 PROCEDURE — 96372 THER/PROPH/DIAG INJ SC/IM: CPT | Performed by: INTERNAL MEDICINE

## 2022-07-28 PROCEDURE — 87040 BLOOD CULTURE FOR BACTERIA: CPT | Performed by: EMERGENCY MEDICINE

## 2022-07-28 PROCEDURE — 83690 ASSAY OF LIPASE: CPT | Performed by: EMERGENCY MEDICINE

## 2022-07-28 PROCEDURE — 25000003 PHARM REV CODE 250: Performed by: EMERGENCY MEDICINE

## 2022-07-28 PROCEDURE — 80179 DRUG ASSAY SALICYLATE: CPT | Performed by: INTERNAL MEDICINE

## 2022-07-28 PROCEDURE — 99214 PR OFFICE/OUTPT VISIT, EST, LEVL IV, 30-39 MIN: ICD-10-PCS | Mod: 24,,, | Performed by: STUDENT IN AN ORGANIZED HEALTH CARE EDUCATION/TRAINING PROGRAM

## 2022-07-28 PROCEDURE — 99214 OFFICE O/P EST MOD 30 MIN: CPT | Mod: 24,,, | Performed by: STUDENT IN AN ORGANIZED HEALTH CARE EDUCATION/TRAINING PROGRAM

## 2022-07-28 PROCEDURE — 85610 PROTHROMBIN TIME: CPT | Performed by: EMERGENCY MEDICINE

## 2022-07-28 PROCEDURE — 96375 TX/PRO/DX INJ NEW DRUG ADDON: CPT

## 2022-07-28 PROCEDURE — 93010 EKG 12-LEAD: ICD-10-PCS | Mod: ,,, | Performed by: SPECIALIST

## 2022-07-28 PROCEDURE — 86703 HIV-1/HIV-2 1 RESULT ANTBDY: CPT | Performed by: INTERNAL MEDICINE

## 2022-07-28 PROCEDURE — 84484 ASSAY OF TROPONIN QUANT: CPT | Mod: 91 | Performed by: INTERNAL MEDICINE

## 2022-07-28 PROCEDURE — 85730 THROMBOPLASTIN TIME PARTIAL: CPT | Performed by: INTERNAL MEDICINE

## 2022-07-28 PROCEDURE — 25000003 PHARM REV CODE 250: Performed by: INTERNAL MEDICINE

## 2022-07-28 PROCEDURE — 36415 COLL VENOUS BLD VENIPUNCTURE: CPT | Performed by: INTERNAL MEDICINE

## 2022-07-28 PROCEDURE — 83605 ASSAY OF LACTIC ACID: CPT | Performed by: EMERGENCY MEDICINE

## 2022-07-28 PROCEDURE — 36415 COLL VENOUS BLD VENIPUNCTURE: CPT | Performed by: EMERGENCY MEDICINE

## 2022-07-28 RX ORDER — PROCHLORPERAZINE EDISYLATE 5 MG/ML
5 INJECTION INTRAMUSCULAR; INTRAVENOUS EVERY 6 HOURS PRN
Status: DISCONTINUED | OUTPATIENT
Start: 2022-07-28 | End: 2022-08-02 | Stop reason: HOSPADM

## 2022-07-28 RX ORDER — MORPHINE SULFATE 2 MG/ML
6 INJECTION, SOLUTION INTRAMUSCULAR; INTRAVENOUS
Status: COMPLETED | OUTPATIENT
Start: 2022-07-28 | End: 2022-07-28

## 2022-07-28 RX ORDER — CARVEDILOL 25 MG/1
25 TABLET ORAL 2 TIMES DAILY
Status: DISCONTINUED | OUTPATIENT
Start: 2022-07-28 | End: 2022-08-02 | Stop reason: HOSPADM

## 2022-07-28 RX ORDER — AMLODIPINE BESYLATE 5 MG/1
10 TABLET ORAL DAILY
Status: DISCONTINUED | OUTPATIENT
Start: 2022-07-29 | End: 2022-08-02 | Stop reason: HOSPADM

## 2022-07-28 RX ORDER — CIPROFLOXACIN 2 MG/ML
400 INJECTION, SOLUTION INTRAVENOUS
Status: DISCONTINUED | OUTPATIENT
Start: 2022-07-28 | End: 2022-07-30

## 2022-07-28 RX ORDER — CEFEPIME HYDROCHLORIDE 1 G/50ML
2 INJECTION, SOLUTION INTRAVENOUS
Status: DISCONTINUED | OUTPATIENT
Start: 2022-07-28 | End: 2022-07-28

## 2022-07-28 RX ORDER — ACETAMINOPHEN 325 MG/1
650 TABLET ORAL EVERY 4 HOURS PRN
Status: DISCONTINUED | OUTPATIENT
Start: 2022-07-28 | End: 2022-08-02 | Stop reason: HOSPADM

## 2022-07-28 RX ORDER — HYDROMORPHONE HYDROCHLORIDE 2 MG/ML
1 INJECTION, SOLUTION INTRAMUSCULAR; INTRAVENOUS; SUBCUTANEOUS
Status: COMPLETED | OUTPATIENT
Start: 2022-07-28 | End: 2022-07-28

## 2022-07-28 RX ORDER — FUROSEMIDE 40 MG/1
40 TABLET ORAL 2 TIMES DAILY
Status: DISCONTINUED | OUTPATIENT
Start: 2022-07-28 | End: 2022-07-29

## 2022-07-28 RX ORDER — LIDOCAINE HYDROCHLORIDE 20 MG/ML
15 SOLUTION OROPHARYNGEAL
Status: COMPLETED | OUTPATIENT
Start: 2022-07-28 | End: 2022-07-28

## 2022-07-28 RX ORDER — ONDANSETRON 2 MG/ML
4 INJECTION INTRAMUSCULAR; INTRAVENOUS EVERY 8 HOURS PRN
Status: DISCONTINUED | OUTPATIENT
Start: 2022-07-28 | End: 2022-08-02 | Stop reason: HOSPADM

## 2022-07-28 RX ORDER — FAMOTIDINE 20 MG/1
20 TABLET, FILM COATED ORAL DAILY
Status: DISCONTINUED | OUTPATIENT
Start: 2022-07-29 | End: 2022-08-02 | Stop reason: HOSPADM

## 2022-07-28 RX ORDER — CLONIDINE 0.2 MG/24H
1 PATCH, EXTENDED RELEASE TRANSDERMAL
Status: DISCONTINUED | OUTPATIENT
Start: 2022-07-31 | End: 2022-08-02 | Stop reason: HOSPADM

## 2022-07-28 RX ORDER — ONDANSETRON 2 MG/ML
8 INJECTION INTRAMUSCULAR; INTRAVENOUS
Status: COMPLETED | OUTPATIENT
Start: 2022-07-28 | End: 2022-07-28

## 2022-07-28 RX ORDER — ISOSORBIDE MONONITRATE 60 MG/1
120 TABLET, EXTENDED RELEASE ORAL DAILY
Status: DISCONTINUED | OUTPATIENT
Start: 2022-07-29 | End: 2022-08-02 | Stop reason: HOSPADM

## 2022-07-28 RX ORDER — INSULIN ASPART 100 [IU]/ML
0-5 INJECTION, SOLUTION INTRAVENOUS; SUBCUTANEOUS
Status: DISCONTINUED | OUTPATIENT
Start: 2022-07-28 | End: 2022-08-02 | Stop reason: HOSPADM

## 2022-07-28 RX ORDER — LEVETIRACETAM 500 MG/1
500 TABLET ORAL 2 TIMES DAILY
Status: DISCONTINUED | OUTPATIENT
Start: 2022-07-28 | End: 2022-08-02 | Stop reason: HOSPADM

## 2022-07-28 RX ORDER — HEPARIN SODIUM,PORCINE/D5W 25000/250
0-40 INTRAVENOUS SOLUTION INTRAVENOUS CONTINUOUS
Status: DISCONTINUED | OUTPATIENT
Start: 2022-07-28 | End: 2022-07-30

## 2022-07-28 RX ORDER — MAG HYDROX/ALUMINUM HYD/SIMETH 200-200-20
30 SUSPENSION, ORAL (FINAL DOSE FORM) ORAL
Status: COMPLETED | OUTPATIENT
Start: 2022-07-28 | End: 2022-07-28

## 2022-07-28 RX ORDER — IBUPROFEN 200 MG
24 TABLET ORAL
Status: DISCONTINUED | OUTPATIENT
Start: 2022-07-28 | End: 2022-08-02 | Stop reason: HOSPADM

## 2022-07-28 RX ORDER — FLUOXETINE HYDROCHLORIDE 20 MG/1
20 CAPSULE ORAL DAILY
Status: DISCONTINUED | OUTPATIENT
Start: 2022-07-29 | End: 2022-08-02 | Stop reason: HOSPADM

## 2022-07-28 RX ORDER — IBUPROFEN 200 MG
16 TABLET ORAL
Status: DISCONTINUED | OUTPATIENT
Start: 2022-07-28 | End: 2022-08-02 | Stop reason: HOSPADM

## 2022-07-28 RX ORDER — METRONIDAZOLE 500 MG/100ML
500 INJECTION, SOLUTION INTRAVENOUS
Status: DISCONTINUED | OUTPATIENT
Start: 2022-07-28 | End: 2022-07-30

## 2022-07-28 RX ORDER — GLUCAGON 1 MG
1 KIT INJECTION
Status: DISCONTINUED | OUTPATIENT
Start: 2022-07-28 | End: 2022-08-02 | Stop reason: HOSPADM

## 2022-07-28 RX ORDER — HYDROMORPHONE HYDROCHLORIDE 2 MG/ML
1 INJECTION, SOLUTION INTRAMUSCULAR; INTRAVENOUS; SUBCUTANEOUS
Status: DISCONTINUED | OUTPATIENT
Start: 2022-07-28 | End: 2022-08-02 | Stop reason: HOSPADM

## 2022-07-28 RX ORDER — NALOXONE HCL 0.4 MG/ML
0.02 VIAL (ML) INJECTION
Status: DISCONTINUED | OUTPATIENT
Start: 2022-07-28 | End: 2022-08-02 | Stop reason: HOSPADM

## 2022-07-28 RX ORDER — OXYCODONE HYDROCHLORIDE 5 MG/1
5 TABLET ORAL EVERY 4 HOURS PRN
Status: DISCONTINUED | OUTPATIENT
Start: 2022-07-28 | End: 2022-07-31

## 2022-07-28 RX ADMIN — ONDANSETRON 8 MG: 2 INJECTION INTRAMUSCULAR; INTRAVENOUS at 12:07

## 2022-07-28 RX ADMIN — LIDOCAINE HYDROCHLORIDE 15 ML: 20 SOLUTION ORAL; TOPICAL at 12:07

## 2022-07-28 RX ADMIN — ALUMINUM HYDROXIDE, MAGNESIUM HYDROXIDE, AND SIMETHICONE 30 ML: 200; 200; 20 SUSPENSION ORAL at 12:07

## 2022-07-28 RX ADMIN — METRONIDAZOLE 500 MG: 500 INJECTION, SOLUTION INTRAVENOUS at 08:07

## 2022-07-28 RX ADMIN — INSULIN ASPART 1 UNITS: 100 INJECTION, SOLUTION INTRAVENOUS; SUBCUTANEOUS at 09:07

## 2022-07-28 RX ADMIN — LEVETIRACETAM 500 MG: 500 TABLET, FILM COATED ORAL at 09:07

## 2022-07-28 RX ADMIN — FUROSEMIDE 40 MG: 40 TABLET ORAL at 09:07

## 2022-07-28 RX ADMIN — CEFEPIME HYDROCHLORIDE 2 G: 2 INJECTION, SOLUTION INTRAVENOUS at 06:07

## 2022-07-28 RX ADMIN — MORPHINE SULFATE 6 MG: 2 INJECTION, SOLUTION INTRAMUSCULAR; INTRAVENOUS at 12:07

## 2022-07-28 RX ADMIN — CIPROFLOXACIN 400 MG: 2 INJECTION, SOLUTION INTRAVENOUS at 07:07

## 2022-07-28 RX ADMIN — HYDROMORPHONE HYDROCHLORIDE 1 MG: 2 INJECTION, SOLUTION INTRAMUSCULAR; INTRAVENOUS; SUBCUTANEOUS at 07:07

## 2022-07-28 RX ADMIN — HYDROMORPHONE HYDROCHLORIDE 1 MG: 2 INJECTION, SOLUTION INTRAMUSCULAR; INTRAVENOUS; SUBCUTANEOUS at 02:07

## 2022-07-28 RX ADMIN — CARVEDILOL 25 MG: 25 TABLET, FILM COATED ORAL at 09:07

## 2022-07-28 RX ADMIN — HEPARIN SODIUM 18 UNITS/KG/HR: 10000 INJECTION, SOLUTION INTRAVENOUS at 06:07

## 2022-07-28 NOTE — H&P
History and Physical for Admission  [facility]      Tabby Howard (MRN: [id number])  Admitting Service: Hospital Medicine  Date of Admission: 7/28/2022   Primary Care Provider: Primary Doctor No    ?  Chief complaint: Abdominal Pain   ?  History of Present Illness:       32F h/o ESRD on HD, DMII, dCHF, HTN p/w one day of abdominal pain found to have elevated LFTs after being discharged the day before for a hospitalization for PNA, DVT of the LUE and severe HTN.    After discharge yesterday, the patient developed right upper quadrant pain which thereafter became constant and by the day of admission was rated as severe and was associated with vomiting.  She has experienced mild relief from pain medications given in the ER.    No chest pain, shortness a breath or lightheadedness.  She was unable to dialysis on 07/28 as she presented here for her pain  ?  ?  Review of Systems:   Constitutional: ; no fevers/chills, night sweats, weight changes, fatigue, recent illnesses, headaches  Eyes: ; denies vision changes including recent decrease in vision, double vision or blurriness  Ears, Nose, Mouth, Throat: denies hearing abnormalities, tinnitus, rhinorrhea, sinus pain/congestion, dysphagia, sore throat, hoarseness, neck pain  Cardiovascular: denies chest pain, palpitations, syncope, HASTINGS, PND, orthopnea  Respiratory: ; denies dyspnea, cough, wheezing  GI: see HPI  :  denies dysuria, hematuria, polyuria, incontinence  MSK: denies joint pain, weakness, myalgias  Integument:  denies rashes, lesions, skin changes  Neuro: denies weakness, paresthesia, tremor, dizziness  Psych: denies history of anxiety/depression  Endocrine: ; denies polyuria/polydipsia, polyphagia, heat/cold intolerance,  Hematologic/lymphatic:  denies easy bleeding/bruising, LAD    ?  Medical History    PAST MEDICAL HISTORY:  has a past medical history of CKD (chronic kidney disease), stage IV (4/12/2022), Diabetes mellitus due to underlying  "condition with unspecified complications (2022), Gastroparesis (2022), Heart failure with preserved ejection fraction (2022), History of gastroesophageal reflux (GERD), History of supraventricular tachycardia, Hyperkalemia (2022), Hypertensive emergency (2022), Sickle cell trait (2022), and Type 2 diabetes mellitus.    PAST SURGICAL HISTORY:  has a past surgical history that includes Esophagogastroduodenoscopy (N/A, 10/18/2019);  section; Placement of dual-lumen vascular catheter (Left, 2022); and Placement of dual-lumen vascular catheter (Right, 2022).  ?  PAST SOCIAL HISTORY:  reports that she has never smoked. She has never used smokeless tobacco. She reports that she does not drink alcohol and does not use drugs.    FAMILY HISTORY: family history includes Diabetes in her father and mother.    ?  CURRENT OUTPATIENT MEDS  Allergies:   Review of patient's allergies indicates:   Allergen Reactions    Penicillins Hives       ?  PHYSICAL EXAM  Vitals: BP (!) 179/111   Pulse 88   Temp 97.7 °F (36.5 °C) (Oral)   Resp 18   Ht 5' 2" (1.575 m)   Wt 74.8 kg (165 lb)   LMP 2021 (Approximate)   SpO2 97%   Breastfeeding No   BMI 30.18 kg/m²  Body mass index is 30.18 kg/m².    General: In pain , AO3  HEENT: PERRL, EOMI.   Cardiovascular: RRR  Respiratory: lungs CTAB  GI: abdomen S/RUQ tenderness without rebound or guarding/ND, +BS  Extremities: no edema  MSK: moves upper extremities.   Neuro/Psych: A&OX3. CNII-XII grossly intact.      EKG and radiographs from the past 24h: reviewed and personally interpreted if available.      LABS    Recent Labs     22  1204   WBC 12.16   HGB 10.4*   *     ?  Recent Labs     22  0341 22  1204    135*   K 4.1 4.3   CO2 23 18*   BUN 35* 31*   CREATININE 4.0* 4.2*   MG 2.0  --    PHOS 5.3*  5.3*  --      ?  Recent Labs     22  1204   BILITOT 1.3*   *   *   ALKPHOS 434*   PROT 5.7* "     ?  Recent Labs     07/28/22  1200   INR 1.0     ?    ASSESSMENT & PLAN    32F h/o ESRD on HD, DMII, dCHF, HTN p/w one day of abdominal pain found to have elevated LFTs after being discharged the day before for a hospitalization for PNA, DVT of the LUE and severe HTN.      1. Abnormal LFTs with possible acute cholecystitis   -hepatitis panel  -cipro/flagyl  -HIDA  -GI, general surgery consult    2. ESRD on HD  -renal consult    3. HTN  -resume home regimen except currently holding hydralazine    4. DMII  -A1C reviewed  -levemir 5 + ISS    5. DVT  -heparin gtt    6. Elevated troponin  -without chest elen  -trend     Highly complex medical decision making, case discussed with ER physician, EKG reviewed.    Code Status/Dispo: Full Code.   ?  Qing Breen    Date: 7/28/2022  Time: 6:05 PM

## 2022-07-28 NOTE — PROGRESS NOTES
C3 nurse attempted to contact patient. The following occurred:   C3 nurse attempted to contact Tabby Howard (father) for a TCC post hospital discharge follow up call. The patient is unable to conduct the call @ this time. The patient requested a callback, pt en route to ED.  Non-[facility] PCP

## 2022-07-28 NOTE — ED PROVIDER NOTES
Encounter Date: 7/28/2022    SCRIBE #1 NOTE: Charissa MALAVE, nini scribing for, and in the presence of, Nura Grant MD.       History     Chief Complaint   Patient presents with    Abdominal Pain     Abdominal pain and chest pain since this am was just discharged on Wednesday      Time seen by provider: 11:43 AM on 07/28/2022    Tabby Howard is a 32 y.o. female who presents to the ED with an onset of mid upper CP and abdominal pain. Patient was discharged from hospital yesterday. With Hx of ESRD, she was initiated on hemodialysis 2 weeks ago. She was scheduled to undergo replacement of malfunctioning Apolinar catheter. While in preop, patient decompensated and was referred to ED where she was found to have PNA. She was admitted to ICU and intubated. After being weaned off ventilator, she was discharged home. Patient was dialyzed while in the hospital yesterday and was scheduled to be dialyzed again today. However, she has been experiencing abdominal pain that radiates to mid chest, as well as N/V since last night. The patient denies any other symptoms at this time. PMHx of sickle cell anemia, end-stage renal disease (on hemodialysis every Tuesday/Thursday/Saturday), diabetes mellitus type 2, gastroparesis and hypertension. PSHx of placement of dual-lumen vascular catheter.    The history is provided by the patient.     Review of patient's allergies indicates:   Allergen Reactions    Penicillins Hives     Past Medical History:   Diagnosis Date    CKD (chronic kidney disease), stage IV 4/12/2022    Diabetes mellitus due to underlying condition with unspecified complications 4/12/2022    Gastroparesis 4/12/2022    Heart failure with preserved ejection fraction 4/12/2022    EF 55% on 3/22    History of gastroesophageal reflux (GERD)     History of supraventricular tachycardia     Hyperkalemia 7/7/2022    Hypertensive emergency 7/8/2022    Sickle cell trait 4/12/2022    Type 2 diabetes mellitus      Past  Surgical History:   Procedure Laterality Date     SECTION      x 3    ESOPHAGOGASTRODUODENOSCOPY N/A 10/18/2019    Procedure: ESOPHAGOGASTRODUODENOSCOPY (EGD);  Surgeon: Gianluca Mendez MD;  Location: [facility];  Service: Endoscopy;  Laterality: N/A;    PLACEMENT OF DUAL-LUMEN VASCULAR CATHETER Left 2022    Procedure: INSERTION-CATHETER-JOSEPH;  Surgeon: Dionte Gan MD;  Location: [facility] OR;  Service: General;  Laterality: Left;    PLACEMENT OF DUAL-LUMEN VASCULAR CATHETER Right 2022    Procedure: INSERTION-CATHETER-Hemosplit;  Surgeon: Dionte Gan MD;  Location: [facility] OR;  Service: General;  Laterality: Right;     Family History   Problem Relation Age of Onset    Diabetes Mother     Diabetes Father      Social History     Tobacco Use    Smoking status: Never Smoker    Smokeless tobacco: Never Used   Substance Use Topics    Alcohol use: No    Drug use: No     Review of Systems   Constitutional: Negative for fever.   HENT: Negative for sore throat.    Respiratory: Negative for shortness of breath.    Cardiovascular: Positive for chest pain.   Gastrointestinal: Positive for abdominal pain, nausea and vomiting.   Genitourinary: Negative for dysuria.   Musculoskeletal: Negative for back pain.   Skin: Negative for rash.   Neurological: Negative for weakness.   Hematological: Does not bruise/bleed easily.       Physical Exam     Initial Vitals   BP Pulse Resp Temp SpO2   22 1145 22 1145 22 1145 22 1152 22 1145   (!) 194/119 89 20 97.7 °F (36.5 °C) 100 %      MAP       --                Physical Exam    Nursing note and vitals reviewed.  Constitutional: She appears well-developed.   Moaning. Appears to be in significant pain.   HENT:   Head: Normocephalic and atraumatic.   Periorbital edema bilaterally. No sublingual edema.   Eyes: EOM are normal. Pupils are equal, round, and reactive to light.   Neck: Phonation normal. Neck supple. No stridor present.    Multiple scars to right and left side of neck. Slight edema to neck.   Cardiovascular: Normal rate, regular rhythm, normal heart sounds and intact distal pulses. Exam reveals no gallop and no friction rub.    No murmur heard.  Pulmonary/Chest: Breath sounds normal. No respiratory distress. She has no decreased breath sounds. She has no wheezes. She has no rhonchi. She has no rales.   Bruising to left breast. Dialysis catheter in right anterior chest wall.     Abdominal: Abdomen is soft. Bowel sounds are normal. She exhibits no distension. There is no abdominal tenderness.   Musculoskeletal:         General: Normal range of motion.      Cervical back: Neck supple.      Right lower leg: Pitting Edema present.      Left lower leg: Pitting Edema present.      Comments: Anasarca.     Neurological: She is alert and oriented to person, place, and time.   Skin: Skin is warm and dry.   Psychiatric: She has a normal mood and affect.         ED Course   Procedures  Labs Reviewed   CBC W/ AUTO DIFFERENTIAL - Abnormal; Notable for the following components:       Result Value    RBC 3.52 (*)     Hemoglobin 10.4 (*)     Hematocrit 30.1 (*)     Platelets 125 (*)     Immature Granulocytes 2.8 (*)     Gran # (ANC) 10.8 (*)     Immature Grans (Abs) 0.34 (*)     Lymph # 0.5 (*)     Gran % 88.8 (*)     Lymph % 3.9 (*)     All other components within normal limits   COMPREHENSIVE METABOLIC PANEL - Abnormal; Notable for the following components:    Sodium 135 (*)     CO2 18 (*)     Glucose 290 (*)     BUN 31 (*)     Creatinine 4.2 (*)     Calcium 8.5 (*)     Total Protein 5.7 (*)     Albumin 2.2 (*)     Total Bilirubin 1.3 (*)     Alkaline Phosphatase 434 (*)      (*)      (*)     eGFR if  15 (*)     eGFR if non  13 (*)     All other components within normal limits   TROPONIN I - Abnormal; Notable for the following components:    Troponin I 0.156 (*)     All other components within normal  limits   CULTURE, BLOOD   CULTURE, BLOOD   LACTIC ACID, PLASMA   LIPASE   PREGNANCY TEST SCREENING, SERUM    Narrative:     Collection has been rescheduled by [facility]ASH at 07/28/2022 13:37 Reason:   Patient unavailable ultrasound   Collection has been rescheduled by MRQ1 at 07/28/2022 13:53 Reason:   Ultrasound    PROTIME-INR   APTT    Narrative:     Draw baseline aPTT prior to starting the heparin bolus or  infusion   SARS-COV-2 RNA AMPLIFICATION, QUAL          Imaging Results          MRI MRCP Without Contrast (Final result)  Result time 07/28/22 16:02:37    Final result by Gianluca Arriaga Jr., MD (07/28/22 16:02:37)                 Impression:      Quite poor study due to continuous respiratory motion.  The study is inadequate to evaluate the common bile duct.  There is a distended gallbladder filled with heterogeneous material      Electronically signed by: Gianluca Arriaga MD  Date:    07/28/2022  Time:    16:02             Narrative:    EXAMINATION:  MRI ABDOMEN WITHOUT CONTRAST MRCP    CLINICAL HISTORY:  eval for choledocolithiasis;    TECHNIQUE:  Multiplanar, multisequence images are performed through the liver and upper abdomen.  Additionally 2D and 3D MRCP sequences are performed as well as MIP images.  The patient was sedated.  There is significant respiratory motion throughout the study which severely degrades the images.    COMPARISON:  Abdomen ultrasound of July 28, 2022.    FINDINGS:  The liver is of normal contour and MR signal.  There is a distended gallbladder with heterogeneous material within the gallbladder itself.  The study is inadequate to evaluate the common bile duct.  3D imaging is nondiagnostic.  Abnormalities of the spleen or kidneys are not seen.                               US Abdomen Limited (Final result)  Result time 07/28/22 14:22:49    Final result by Gianluca Arriaga Jr., MD (07/28/22 14:22:49)                 Impression:      Cholelithiasis with thickening of the gallbladder  wall.  Common duct dilation is not seen.  Trace ascites seen in the 4 quadrants.      Electronically signed by: Gianluca Arriaga MD  Date:    07/28/2022  Time:    14:22             Narrative:    EXAMINATION:  US ABDOMEN LIMITED    CLINICAL HISTORY:  ruq abd pain;    TECHNIQUE:  Limited ultrasound of the right upper quadrant of the abdomen (including pancreas, liver, gallbladder, common bile duct, and spleen) was performed.    COMPARISON:  None.    FINDINGS:  Liver: Normal in size, measuring 15 cm. Homogeneous echotexture. No focal hepatic lesions.    Gallbladder: Multiple gallstones are identified.  There is mild thickening of the gallbladder wall at 3.2 mm.    Biliary system: The common duct is not dilated, measuring 3.7 mm.  No intrahepatic ductal dilatation.    The right kidney measures 11.1 cm without mass or hydronephrosis.    Miscellaneous: Small amounts of ascites are seen adjacent to the liver spleen and lateral abdomen.  The quantity is not considered large enough for paracentesis.                               X-Ray Chest AP Portable (Final result)  Result time 07/28/22 12:39:09    Final result by Gianluca Arriaga Jr., MD (07/28/22 12:39:09)                 Impression:      Double lumen central line ends at the level of the right atrium.  Mild cardiomegaly.  Pulmonary edema has resolved.      Electronically signed by: Gianluca Arriaga MD  Date:    07/28/2022  Time:    12:39             Narrative:    EXAMINATION:  XR CHEST AP PORTABLE    CLINICAL HISTORY:  Epigastric pain    TECHNIQUE:  Single frontal view of the chest was performed.    COMPARISON:  Chest of July 26, 2022    FINDINGS:  A large central line enters the right neck and ends in the right atrium.  There is mild cardiomegaly in a biventricular pattern.  Intrapulmonary mass or infiltrate is not presently seen.  No pneumothorax or pleural effusion is noted.                                 Medications   ciprofloxacin (CIPRO)400mg/200ml D5W IVPB 400  mg (has no administration in time range)   metronidazole IVPB 500 mg (has no administration in time range)   amLODIPine tablet 10 mg (has no administration in time range)   carvediloL tablet 25 mg (has no administration in time range)   cloNIDine 0.2 mg/24 hr td ptwk 1 patch (has no administration in time range)   famotidine tablet 20 mg (has no administration in time range)   FLUoxetine capsule 20 mg (has no administration in time range)   furosemide tablet 40 mg (has no administration in time range)   isosorbide mononitrate 24 hr tablet 120 mg (has no administration in time range)   levETIRAcetam tablet 500 mg (has no administration in time range)   oxyCODONE immediate release tablet 5 mg (has no administration in time range)   naloxone 0.4 mg/mL injection 0.02 mg (has no administration in time range)   insulin aspart U-100 pen 0-5 Units (has no administration in time range)   glucose chewable tablet 16 g (has no administration in time range)   glucose chewable tablet 24 g (has no administration in time range)   glucagon (human recombinant) injection 1 mg (has no administration in time range)   dextrose 10% bolus 125 mL (has no administration in time range)   dextrose 10% bolus 250 mL (has no administration in time range)   acetaminophen tablet 650 mg (has no administration in time range)   HYDROmorphone (PF) injection 1 mg (has no administration in time range)   ondansetron injection 4 mg (has no administration in time range)   prochlorperazine injection Soln 5 mg (has no administration in time range)   insulin detemir U-100 pen 5 Units (has no administration in time range)   heparin 25,000 units in dextrose 5% (100 units/ml) IV bolus from bag INITIAL BOLUS (has no administration in time range)   heparin 25,000 units in dextrose 5% 250 mL (100 units/mL) infusion HIGH INTENSITY nomogram - [facility] (has no administration in time range)   heparin 25,000 units in dextrose 5% (100 units/ml) IV bolus from bag - ADDITIONAL  PRN BOLUS - 60 units/kg (has no administration in time range)   heparin 25,000 units in dextrose 5% (100 units/ml) IV bolus from bag - ADDITIONAL PRN BOLUS - 30 units/kg (has no administration in time range)   morphine injection 6 mg (6 mg Intravenous Given 7/28/22 1204)   ondansetron injection 8 mg (8 mg Intravenous Given 7/28/22 1203)   aluminum-magnesium hydroxide-simethicone 200-200-20 mg/5 mL suspension 30 mL (30 mLs Oral Given 7/28/22 1236)     And   LIDOcaine HCl 2% oral solution 15 mL (15 mLs Oral Given 7/28/22 1236)   HYDROmorphone (PF) injection 1 mg (1 mg Intravenous Given 7/28/22 1431)     Medical Decision Making:   History:   Old Medical Records: I decided to obtain old medical records.  Independently Interpreted Test(s):   I have ordered and independently interpreted EKG Reading(s) - see prior notes  Clinical Tests:   Lab Tests: Reviewed and Ordered  Radiological Study: Ordered and Reviewed  Medical Tests: Reviewed and Ordered          Scribe Attestation:   Scribe #1: I performed the above scribed service and the documentation accurately describes the services I performed. I attest to the accuracy of the note.        ED Course as of 07/28/22 1855   Thu Jul 28, 2022   1231 Patient has an improving pneumonia left lower lobe but still has significant cardiomegaly.  She may have pain from pericarditis.  Will get a troponin and EKG.  She could also have large pericardial effusion. [JS]   1235 EKG independently interpreted by me as rate 87 normal sinus rhythm rate axis low-voltage QRS no signs of electrical alternans no signs of ST segment elevation or SC depression to suggest pericarditis. [JS]   1315 Patient has a history of gallstones.  She is tender in the midepigastric and right upper quadrant.  She has a history of gallstones.  I have ordered ultrasound.  She may need an MRI given slight elevation T bili.  Will discuss with surgery. [JS]   1615 Gi Dr. Zhong recommends hida as does dr. Gan.  Cbd is  normal on u/s and gi doesn't think choledocolithiasis [JS]   1632 General surgery here seeing the patient.  I will start her on antibiotics.  Troponin elevation is likely secondary to chronic kidney disease and recent pulmonary edema.  I do not think she having acute coronary syndrome [JS]      ED Course User Index  [JS] Nura Grant MD           I, Dr. Nura Grant personally performed the services described in this documentation. All medical record entries made by the scribe were at my direction and in my presence.  I have reviewed the chart and agree that the record reflects my personal performance and is accurate and complete. Nura Grant MD.  6:55 PM 07/28/2022    DISCLAIMER: This note was prepared with Dragon NaturallySpeaking voice recognition transcription software. Garbled syntax, mangled pronouns, and other bizarre constructions may be attributed to that software system   Clinical Impression:   Final diagnoses:  [R10.13] Epigastric pain  [R74.01] Transaminitis (Primary)  [R11.2] Nausea and vomiting in adult  [K80.20] Cholelithiasis without cholangitis          ED Disposition Condition    Observation               Nura Grant MD  07/28/22 1640

## 2022-07-28 NOTE — PROGRESS NOTES
C3 nurse attempted to contact patient. The following occurred:   C3 nurse attempted to contact Tabby Howard (father) for a TCC post hospital discharge follow up call. The patient is unable to conduct the call @ this time. The patient requested a callback.  Non-[facility] PCP

## 2022-07-28 NOTE — ED NOTES
Tabby Howard presents to the ED with c/o epigastric pain that radiates into her mid sternum. Patient was recently discharged from the hospital. Her father reports that he was taking her to dialysis today but brought her to the ED secondary to her not feel well. Mucous membranes are pink and moist. Skin is warm, dry and intact. ALLEN MIRANDAS  Verified patient's name and date of birth.

## 2022-07-28 NOTE — CONSULTS
[facility] Emergency Dept  General Surgery  Consult Note    Consults  Subjective:     Chief Complaint/Reason for Admission:  Abdominal pain    History of Present Illness:  This is a 32-year-old female who recently had HemoSplit line malfunction.  She was brought into the hospital for exchange and found to be in DKA.  She was profoundly hypoxic and required intubation.  She ultimately was treated for pneumonia and volume overload with dialysis.  She was discharged recently.  She re-presented with abdominal pain.    Current Facility-Administered Medications on File Prior to Encounter   Medication    fentaNYL 50 mcg/mL injection    midazolam injection    propofol (DIPRIVAN) 10 mg/mL infusion    rocuronium injection    succinylcholine injection     Current Outpatient Medications on File Prior to Encounter   Medication Sig    amLODIPine (NORVASC) 10 MG tablet Take 1 tablet (10 mg total) by mouth once daily.    apixaban (ELIQUIS) 5 mg Tab TAKE 2 TABLETS BY MOUTH TWICE DAILY FOR 7 DAYS THEN 1 TABLET TWICE DAILY THERE AFTER    [START ON 8/24/2022] apixaban (ELIQUIS) 5 mg Tab Take 1 tablet (5 mg total) by mouth 2 (two) times daily. For second month on.    carvediloL (COREG) 25 MG tablet Take 1 tablet (25 mg total) by mouth 2 (two) times daily.    [START ON 7/31/2022] cloNIDine 0.2 mg/24 hr td ptwk (CATAPRES) 0.2 mg/24 hr Place 1 patch onto the skin every 7 days.    famotidine (PEPCID) 20 MG tablet Take 1 tablet (20 mg total) by mouth once daily.    FLUoxetine 20 MG capsule Take 1 capsule (20 mg total) by mouth once daily.    furosemide (LASIX) 40 MG tablet Take 1 tablet (40 mg total) by mouth 2 (two) times daily.    hydrALAZINE (APRESOLINE) 100 MG tablet Take 1 tablet (100 mg total) by mouth every 8 (eight) hours.    HYDROcodone-acetaminophen (NORCO) 5-325 mg per tablet Take 1 tablet by mouth every 6 (six) hours as needed for Pain.    insulin aspart U-100 (NOVOLOG) 100 unit/mL (3 mL) InPn pen Inject 1-10  Units into the skin before meals and at bedtime as needed (Hyperglycemia).    isosorbide mononitrate (IMDUR) 60 MG 24 hr tablet Take 2 tablets (120 mg total) by mouth once daily.    LANTUS U-100 INSULIN 100 unit/mL injection SMARTSI Unit(s) SUB-Q Every Morning    levETIRAcetam (KEPPRA) 500 MG Tab Take 1 tablet (500 mg total) by mouth 2 (two) times daily.    [START ON 2022] levoFLOXacin (LEVAQUIN) 500 MG tablet Take 1 tablet (500 mg total) by mouth every other day. for 1 day    polyethylene glycol (GLYCOLAX) 17 gram/dose powder Take 17 g by mouth 2 (two) times daily.    predniSONE (DELTASONE) 20 MG tablet Take 3 tablets (60 mg total) by mouth once daily for 4 days, THEN 2 tablets (40 mg total) once daily for 4 days, THEN 1 tablet (20 mg total) once daily for 4 days, THEN 0.5 tablets (10 mg total) once daily for 4 days.    [DISCONTINUED] atenoloL (TENORMIN) 50 MG tablet Take 1 tablet (50 mg total) by mouth every other day.    [DISCONTINUED] pantoprazole (PROTONIX) 40 MG tablet Take 1 tablet (40 mg total) by mouth once daily.    [DISCONTINUED] torsemide (DEMADEX) 20 MG Tab Take 1 tablet (20 mg total) by mouth 2 (two) times a day. (Patient taking differently: Take 20 mg by mouth once daily.)       Review of patient's allergies indicates:   Allergen Reactions    Penicillins Hives       Past Medical History:   Diagnosis Date    CKD (chronic kidney disease), stage IV 2022    Diabetes mellitus due to underlying condition with unspecified complications 2022    Gastroparesis 2022    Heart failure with preserved ejection fraction 2022    EF 55% on 3/22    History of gastroesophageal reflux (GERD)     History of supraventricular tachycardia     Hyperkalemia 2022    Hypertensive emergency 2022    Sickle cell trait 2022    Type 2 diabetes mellitus      Past Surgical History:   Procedure Laterality Date     SECTION      x 3    ESOPHAGOGASTRODUODENOSCOPY N/A  10/18/2019    Procedure: ESOPHAGOGASTRODUODENOSCOPY (EGD);  Surgeon: Gianluca Mendez MD;  Location: [facility] ENDO;  Service: Endoscopy;  Laterality: N/A;    PLACEMENT OF DUAL-LUMEN VASCULAR CATHETER Left 7/12/2022    Procedure: INSERTION-CATHETER-JOSEPH;  Surgeon: Dionte Gan MD;  Location: [facility] OR;  Service: General;  Laterality: Left;    PLACEMENT OF DUAL-LUMEN VASCULAR CATHETER Right 7/26/2022    Procedure: INSERTION-CATHETER-Hemosplit;  Surgeon: Dionte Gan MD;  Location: [facility] OR;  Service: General;  Laterality: Right;     Family History     Problem Relation (Age of Onset)    Diabetes Mother, Father        Tobacco Use    Smoking status: Never Smoker    Smokeless tobacco: Never Used   Substance and Sexual Activity    Alcohol use: No    Drug use: No    Sexual activity: Not Currently     Partners: Male     Birth control/protection: I.U.D.     Review of Systems   Constitutional: Negative for fever.   HENT: Negative.    Eyes: Negative.    Respiratory: Positive for shortness of breath.    Cardiovascular: Negative.    Gastrointestinal: Positive for abdominal pain and nausea.   Endocrine: Negative.    Genitourinary: Negative.    Musculoskeletal: Negative.    Skin: Negative.    Allergic/Immunologic: Negative.    Neurological: Negative.    Hematological: Negative.    Psychiatric/Behavioral: Negative.      Objective:     Vital Signs (Most Recent):  Temp: 97.7 °F (36.5 °C) (07/28/22 1152)  Pulse: 88 (07/28/22 1402)  Resp: 18 (07/28/22 1431)  BP: (!) 179/111 (07/28/22 1402)  SpO2: 97 % (07/28/22 1402) Vital Signs (24h Range):  Temp:  [97.7 °F (36.5 °C)] 97.7 °F (36.5 °C)  Pulse:  [87-89] 88  Resp:  [18-22] 18  SpO2:  [97 %-100 %] 97 %  BP: (179-202)/(111-119) 179/111     Weight: 74.8 kg (165 lb)  Body mass index is 30.18 kg/m².    No intake or output data in the 24 hours ending 07/28/22 1751    Physical Exam  Constitutional:       General: She is not in acute distress.     Appearance: Normal appearance. She is  not ill-appearing, toxic-appearing or diaphoretic.   HENT:      Head: Normocephalic.      Nose: Nose normal.   Eyes:      Conjunctiva/sclera: Conjunctivae normal.   Cardiovascular:      Rate and Rhythm: Normal rate and regular rhythm.   Pulmonary:      Effort: Pulmonary effort is normal.   Abdominal:      Palpations: Abdomen is soft.      Comments: There is mild abdominal tenderness, more so in the right upper quadrant it seems   Musculoskeletal:         General: Normal range of motion.      Cervical back: Normal range of motion.      Comments: Anasarca is present   Skin:     General: Skin is warm.   Neurological:      General: No focal deficit present.      Mental Status: She is alert.   Psychiatric:         Mood and Affect: Mood normal.         Significant Labs:  CBC:   Recent Labs   Lab 07/28/22  1204   WBC 12.16   RBC 3.52*   HGB 10.4*   HCT 30.1*   *   MCV 86   MCH 29.5   MCHC 34.6     CMP:   Recent Labs   Lab 07/28/22  1204   *   CALCIUM 8.5*   ALBUMIN 2.2*   PROT 5.7*   *   K 4.3   CO2 18*      BUN 31*   CREATININE 4.2*   ALKPHOS 434*   *   *   BILITOT 1.3*     Coagulation:   Recent Labs   Lab 07/28/22  1200   INR 1.0     Lactic Acid:   Recent Labs   Lab 07/28/22  1204   LACTATE 0.6       Significant Diagnostics:  Gallstones with wall thickening.  Trace ascites in the abdomen.    Assessment/Plan:     This is a 32-year-old female with end-stage renal disease on dialysis, mild heart failure, recent acute respiratory failure.  She presented with abdominal pain.  This seems to be more right-sided.  She has a fairly significant elevation in her LFTs with a mild elevation in her bilirubin.    Patient would not sit still for MRCP.  Recommend additional imaging with CT and HIDA scan which have been ordered.  Antibiotics  Trend labs  Will follow-up imaging in the morning.  NPO at midnight.  Possible cholecystectomy tomorrow pending imaging results.    Thank you for your consult.  I will follow-up with patient. Please contact us if you have any additional questions.    Dionte Gan MD  General Surgery  [facility] - Emergency Dept

## 2022-07-29 ENCOUNTER — ANESTHESIA EVENT (OUTPATIENT)
Dept: SURGERY | Facility: HOSPITAL | Age: 32
DRG: 417 | End: 2022-07-29
Payer: MEDICAID

## 2022-07-29 LAB
ALBUMIN SERPL BCP-MCNC: 1.7 G/DL (ref 3.5–5.2)
ALP SERPL-CCNC: 316 U/L (ref 55–135)
ALT SERPL W/O P-5'-P-CCNC: 186 U/L (ref 10–44)
ANION GAP SERPL CALC-SCNC: 13 MMOL/L (ref 8–16)
APTT BLDCRRT: 34 SEC (ref 21–32)
APTT BLDCRRT: 47.7 SEC (ref 21–32)
APTT BLDCRRT: 52.7 SEC (ref 21–32)
AST SERPL-CCNC: 62 U/L (ref 10–40)
BASOPHILS # BLD AUTO: 0.03 K/UL (ref 0–0.2)
BASOPHILS NFR BLD: 0.3 % (ref 0–1.9)
BILIRUB SERPL-MCNC: 0.6 MG/DL (ref 0.1–1)
BUN SERPL-MCNC: 38 MG/DL (ref 6–20)
CALCIUM SERPL-MCNC: 8 MG/DL (ref 8.7–10.5)
CHLORIDE SERPL-SCNC: 103 MMOL/L (ref 95–110)
CO2 SERPL-SCNC: 19 MMOL/L (ref 23–29)
CREAT SERPL-MCNC: 4.5 MG/DL (ref 0.5–1.4)
DIFFERENTIAL METHOD: ABNORMAL
EOSINOPHIL # BLD AUTO: 0 K/UL (ref 0–0.5)
EOSINOPHIL NFR BLD: 0.1 % (ref 0–8)
ERYTHROCYTE [DISTWIDTH] IN BLOOD BY AUTOMATED COUNT: 14.3 % (ref 11.5–14.5)
EST. GFR  (AFRICAN AMERICAN): 14 ML/MIN/1.73 M^2
EST. GFR  (NON AFRICAN AMERICAN): 12 ML/MIN/1.73 M^2
GLUCOSE SERPL-MCNC: 255 MG/DL (ref 70–110)
HAV IGM SERPL QL IA: NEGATIVE
HBV CORE IGM SERPL QL IA: NEGATIVE
HBV SURFACE AG SERPL QL IA: NEGATIVE
HCT VFR BLD AUTO: 29.6 % (ref 37–48.5)
HCV AB SERPL QL IA: NEGATIVE
HGB BLD-MCNC: 10.3 G/DL (ref 12–16)
IMM GRANULOCYTES # BLD AUTO: 0.15 K/UL (ref 0–0.04)
IMM GRANULOCYTES NFR BLD AUTO: 1.6 % (ref 0–0.5)
LYMPHOCYTES # BLD AUTO: 0.9 K/UL (ref 1–4.8)
LYMPHOCYTES NFR BLD: 9.7 % (ref 18–48)
MAGNESIUM SERPL-MCNC: 2.1 MG/DL (ref 1.6–2.6)
MCH RBC QN AUTO: 29.5 PG (ref 27–31)
MCHC RBC AUTO-ENTMCNC: 34.8 G/DL (ref 32–36)
MCV RBC AUTO: 85 FL (ref 82–98)
MONOCYTES # BLD AUTO: 0.7 K/UL (ref 0.3–1)
MONOCYTES NFR BLD: 7.7 % (ref 4–15)
NEUTROPHILS # BLD AUTO: 7.8 K/UL (ref 1.8–7.7)
NEUTROPHILS NFR BLD: 80.6 % (ref 38–73)
NRBC BLD-RTO: 0 /100 WBC
PHOSPHATE SERPL-MCNC: 6.3 MG/DL (ref 2.7–4.5)
PLATELET # BLD AUTO: 130 K/UL (ref 150–450)
PMV BLD AUTO: 10.6 FL (ref 9.2–12.9)
POCT GLUCOSE: 182 MG/DL (ref 70–110)
POCT GLUCOSE: 202 MG/DL (ref 70–110)
POTASSIUM SERPL-SCNC: 4.7 MMOL/L (ref 3.5–5.1)
PROT SERPL-MCNC: 4.7 G/DL (ref 6–8.4)
RBC # BLD AUTO: 3.49 M/UL (ref 4–5.4)
SODIUM SERPL-SCNC: 135 MMOL/L (ref 136–145)
TROPONIN I SERPL DL<=0.01 NG/ML-MCNC: 0.1 NG/ML (ref 0–0.03)
TROPONIN I SERPL DL<=0.01 NG/ML-MCNC: 0.12 NG/ML (ref 0–0.03)
WBC # BLD AUTO: 9.65 K/UL (ref 3.9–12.7)

## 2022-07-29 PROCEDURE — 36415 COLL VENOUS BLD VENIPUNCTURE: CPT | Performed by: INTERNAL MEDICINE

## 2022-07-29 PROCEDURE — 83735 ASSAY OF MAGNESIUM: CPT | Performed by: INTERNAL MEDICINE

## 2022-07-29 PROCEDURE — S0030 INJECTION, METRONIDAZOLE: HCPCS | Performed by: INTERNAL MEDICINE

## 2022-07-29 PROCEDURE — 85730 THROMBOPLASTIN TIME PARTIAL: CPT | Performed by: INTERNAL MEDICINE

## 2022-07-29 PROCEDURE — 99222 1ST HOSP IP/OBS MODERATE 55: CPT | Mod: ,,, | Performed by: INTERNAL MEDICINE

## 2022-07-29 PROCEDURE — 63600175 PHARM REV CODE 636 W HCPCS: Performed by: INTERNAL MEDICINE

## 2022-07-29 PROCEDURE — 85025 COMPLETE CBC W/AUTO DIFF WBC: CPT | Performed by: INTERNAL MEDICINE

## 2022-07-29 PROCEDURE — 84484 ASSAY OF TROPONIN QUANT: CPT | Performed by: INTERNAL MEDICINE

## 2022-07-29 PROCEDURE — 99222 PR INITIAL HOSPITAL CARE,LEVL II: ICD-10-PCS | Mod: ,,, | Performed by: INTERNAL MEDICINE

## 2022-07-29 PROCEDURE — 12000002 HC ACUTE/MED SURGE SEMI-PRIVATE ROOM

## 2022-07-29 PROCEDURE — 80100014 HC HEMODIALYSIS 1:1

## 2022-07-29 PROCEDURE — 84100 ASSAY OF PHOSPHORUS: CPT | Performed by: INTERNAL MEDICINE

## 2022-07-29 PROCEDURE — 85730 THROMBOPLASTIN TIME PARTIAL: CPT | Mod: 91 | Performed by: INTERNAL MEDICINE

## 2022-07-29 PROCEDURE — 80053 COMPREHEN METABOLIC PANEL: CPT | Performed by: INTERNAL MEDICINE

## 2022-07-29 PROCEDURE — C9399 UNCLASSIFIED DRUGS OR BIOLOG: HCPCS | Performed by: INTERNAL MEDICINE

## 2022-07-29 PROCEDURE — 25000003 PHARM REV CODE 250: Performed by: INTERNAL MEDICINE

## 2022-07-29 RX ORDER — SODIUM CHLORIDE 9 MG/ML
INJECTION, SOLUTION INTRAVENOUS ONCE
Status: CANCELLED | OUTPATIENT
Start: 2022-07-29 | End: 2022-07-29

## 2022-07-29 RX ORDER — FUROSEMIDE 10 MG/ML
80 INJECTION INTRAMUSCULAR; INTRAVENOUS
Status: DISCONTINUED | OUTPATIENT
Start: 2022-07-29 | End: 2022-07-30

## 2022-07-29 RX ORDER — HEPARIN SODIUM 1000 [USP'U]/ML
4000 INJECTION, SOLUTION INTRAVENOUS; SUBCUTANEOUS
Status: DISCONTINUED | OUTPATIENT
Start: 2022-07-29 | End: 2022-07-30

## 2022-07-29 RX ADMIN — HEPARIN SODIUM 20 UNITS/KG/HR: 10000 INJECTION, SOLUTION INTRAVENOUS at 11:07

## 2022-07-29 RX ADMIN — METRONIDAZOLE 500 MG: 500 INJECTION, SOLUTION INTRAVENOUS at 08:07

## 2022-07-29 RX ADMIN — HYDROMORPHONE HYDROCHLORIDE 1 MG: 2 INJECTION, SOLUTION INTRAMUSCULAR; INTRAVENOUS; SUBCUTANEOUS at 03:07

## 2022-07-29 RX ADMIN — ONDANSETRON 4 MG: 2 INJECTION INTRAMUSCULAR; INTRAVENOUS at 09:07

## 2022-07-29 RX ADMIN — FUROSEMIDE 80 MG: 10 INJECTION, SOLUTION INTRAMUSCULAR; INTRAVENOUS at 12:07

## 2022-07-29 RX ADMIN — INSULIN ASPART 3 UNITS: 100 INJECTION, SOLUTION INTRAVENOUS; SUBCUTANEOUS at 09:07

## 2022-07-29 RX ADMIN — METRONIDAZOLE 500 MG: 500 INJECTION, SOLUTION INTRAVENOUS at 03:07

## 2022-07-29 RX ADMIN — ISOSORBIDE MONONITRATE 120 MG: 60 TABLET, EXTENDED RELEASE ORAL at 09:07

## 2022-07-29 RX ADMIN — INSULIN DETEMIR 5 UNITS: 100 INJECTION, SOLUTION SUBCUTANEOUS at 09:07

## 2022-07-29 RX ADMIN — CARVEDILOL 25 MG: 25 TABLET, FILM COATED ORAL at 09:07

## 2022-07-29 RX ADMIN — HYDROMORPHONE HYDROCHLORIDE 1 MG: 2 INJECTION, SOLUTION INTRAMUSCULAR; INTRAVENOUS; SUBCUTANEOUS at 07:07

## 2022-07-29 RX ADMIN — CARVEDILOL 25 MG: 25 TABLET, FILM COATED ORAL at 10:07

## 2022-07-29 RX ADMIN — FAMOTIDINE 20 MG: 20 TABLET ORAL at 09:07

## 2022-07-29 RX ADMIN — HEPARIN SODIUM 4000 UNITS: 1000 INJECTION, SOLUTION INTRAVENOUS; SUBCUTANEOUS at 05:07

## 2022-07-29 RX ADMIN — LEVETIRACETAM 500 MG: 500 TABLET, FILM COATED ORAL at 09:07

## 2022-07-29 RX ADMIN — FLUOXETINE 20 MG: 20 CAPSULE ORAL at 09:07

## 2022-07-29 RX ADMIN — LEVETIRACETAM 500 MG: 500 TABLET, FILM COATED ORAL at 10:07

## 2022-07-29 RX ADMIN — FUROSEMIDE 40 MG: 40 TABLET ORAL at 09:07

## 2022-07-29 RX ADMIN — FUROSEMIDE 80 MG: 10 INJECTION, SOLUTION INTRAMUSCULAR; INTRAVENOUS at 11:07

## 2022-07-29 RX ADMIN — CIPROFLOXACIN 400 MG: 2 INJECTION, SOLUTION INTRAVENOUS at 09:07

## 2022-07-29 RX ADMIN — HYDROMORPHONE HYDROCHLORIDE 1 MG: 2 INJECTION, SOLUTION INTRAMUSCULAR; INTRAVENOUS; SUBCUTANEOUS at 11:07

## 2022-07-29 RX ADMIN — METRONIDAZOLE 500 MG: 500 INJECTION, SOLUTION INTRAVENOUS at 12:07

## 2022-07-29 RX ADMIN — AMLODIPINE BESYLATE 10 MG: 5 TABLET ORAL at 09:07

## 2022-07-29 NOTE — PROGRESS NOTES
07/29/22 1800   Post-Hemodialysis Assessment   Rinseback Volume (mL) 250 mL   Blood Volume Processed (Liters) 0 L  (Ultrafiltration)   Dialyzer Clearance Lightly streaked   Additional Fluid Intake (mL) 500 mL   Total UF (mL) 3500 mL   Net Fluid Removal 3000   Patient Response to Treatment Pt tolerated well. Pt in bed with eyes closed, easily aroused.   Post-Treatment Weight 72.1 kg (158 lb 15.2 oz)   Treatment Weight Change -3.4   Post-Hemodialysis Comments HD complete. No distress noted. Cath intact.

## 2022-07-29 NOTE — PLAN OF CARE
07/29/22 1308   Readmission   Why were you hospitalized in the last 30 days? respiratory failure   Why were you readmitted? New medical problem   When you left the hospital where did you go? Home with Family   Did patient/caregiver refused recommended DC plan? Yes   Tell me about what happened between when you left the hospital and the day you returned. stomach pain

## 2022-07-29 NOTE — CONSULTS
INPATIENT NEPHROLOGY CONSULT   [facility] NEPHROLOGY    Tabby Howard  07/29/2022    Reason for consultation:    esrd    Chief Complaint:   Chief Complaint   Patient presents with    Abdominal Pain     Abdominal pain and chest pain since this am was just discharged on Wednesday           History of Present Illness:         Per H and P    32F h/o ESRD on HD, DMII, dCHF, HTN p/w one day of abdominal pain found to have elevated LFTs after being discharged the day before for a hospitalization for PNA, DVT of the LUE and severe HTN.      7/29 She denies neurologic symptoms, fever, urinary or bowel complaints or cough.  She has mild shortness of breath.  She has constant abdominal pain with no exacerbating or alleviating factors.        Plan of Care:       Assessment:    esrd  --continue dialysis per routine  --fluid restrict  --renal dose medication per routine  --continue outpt medication  --continue binders with meals    Hypervolemia  --supplemental ultrafiltration today  --lasix 80mg iv bid (still nonoliguric)    Abdominal pain  --HIDA scan today  -- GI consulted  --lipase normal     hypertension  --uf today and tomorrow  --fluid restrict  --low salt diet  --continue amlodipine, coreg, clonidine, hydralazine, atenolol  --would avoid addition of minoxidil due to the pericardial effusion    Anemia  --hold epogen until pt has better bp control    Thank you for allowing us to participate in this patient's care. We will continue to follow.    Vital Signs:  Temp Readings from Last 3 Encounters:   07/29/22 97.8 °F (36.6 °C)   07/27/22 98.5 °F (36.9 °C) (Oral)   07/22/22 98.7 °F (37.1 °C) (Temporal)       Pulse Readings from Last 3 Encounters:   07/29/22 85   07/27/22 81   07/22/22 91       BP Readings from Last 3 Encounters:   07/29/22 (!) 176/83   07/27/22 (!) 146/72   07/22/22 (!) 153/95       Weight:  Wt Readings from Last 3 Encounters:   07/28/22 74.8 kg (165 lb)   07/26/22 75.2 kg (165 lb 12.6 oz)   07/22/22 74.8 kg  (165 lb)       Past Medical & Surgical History:  Past Medical History:   Diagnosis Date    CKD (chronic kidney disease), stage IV 2022    Diabetes mellitus due to underlying condition with unspecified complications 2022    Gastroparesis 2022    Heart failure with preserved ejection fraction 2022    EF 55% on 3/22    History of gastroesophageal reflux (GERD)     History of supraventricular tachycardia     Hyperkalemia 2022    Hypertensive emergency 2022    Sickle cell trait 2022    Type 2 diabetes mellitus        Past Surgical History:   Procedure Laterality Date     SECTION      x 3    ESOPHAGOGASTRODUODENOSCOPY N/A 10/18/2019    Procedure: ESOPHAGOGASTRODUODENOSCOPY (EGD);  Surgeon: Gianluca Mendez MD;  Location: [facility];  Service: Endoscopy;  Laterality: N/A;    PLACEMENT OF DUAL-LUMEN VASCULAR CATHETER Left 2022    Procedure: INSERTION-CATHETER-JOSEPH;  Surgeon: Dionte Gan MD;  Location: [facility] OR;  Service: General;  Laterality: Left;    PLACEMENT OF DUAL-LUMEN VASCULAR CATHETER Right 2022    Procedure: INSERTION-CATHETER-Hemosplit;  Surgeon: Dionte Gan MD;  Location: [facility] OR;  Service: General;  Laterality: Right;       Past Social History:  Social History     Socioeconomic History    Marital status:    Tobacco Use    Smoking status: Never Smoker    Smokeless tobacco: Never Used   Substance and Sexual Activity    Alcohol use: No    Drug use: No    Sexual activity: Not Currently     Partners: Male     Birth control/protection: I.U.D.       Medications:  Current Facility-Administered Medications on File Prior to Encounter   Medication Dose Route Frequency Provider Last Rate Last Admin    fentaNYL 50 mcg/mL injection   Intravenous PRN Jann Randall Jr., CRNA   100 mcg at 22 1502    midazolam injection   Intravenous PRN Jann Randall Jr., CRNA   2 mg at 22 1502    propofol (DIPRIVAN) 10 mg/mL infusion    Intravenous PRN Jann Ingramor ., CRNA   100 mg at 22 1500    rocuronium injection   Intravenous PRN Jann Ingramor Jr., CRNA   20 mg at 22 1502    succinylcholine injection   Intravenous PRN Jann Ingramor ., CRNA   120 mg at 22 1500     Current Outpatient Medications on File Prior to Encounter   Medication Sig Dispense Refill    amLODIPine (NORVASC) 10 MG tablet Take 1 tablet (10 mg total) by mouth once daily. 30 tablet 11    apixaban (ELIQUIS) 5 mg Tab TAKE 2 TABLETS BY MOUTH TWICE DAILY FOR 7 DAYS THEN 1 TABLET TWICE DAILY THERE AFTER 74 tablet 0    [START ON 2022] apixaban (ELIQUIS) 5 mg Tab Take 1 tablet (5 mg total) by mouth 2 (two) times daily. For second month on. 60 tablet 2    carvediloL (COREG) 25 MG tablet Take 1 tablet (25 mg total) by mouth 2 (two) times daily. 60 tablet 2    [START ON 2022] cloNIDine 0.2 mg/24 hr td ptwk (CATAPRES) 0.2 mg/24 hr Place 1 patch onto the skin every 7 days. 4 patch 2    famotidine (PEPCID) 20 MG tablet Take 1 tablet (20 mg total) by mouth once daily. 30 tablet 0    FLUoxetine 20 MG capsule Take 1 capsule (20 mg total) by mouth once daily. 30 capsule 1    furosemide (LASIX) 40 MG tablet Take 1 tablet (40 mg total) by mouth 2 (two) times daily. 60 tablet 0    hydrALAZINE (APRESOLINE) 100 MG tablet Take 1 tablet (100 mg total) by mouth every 8 (eight) hours. 90 tablet 2    HYDROcodone-acetaminophen (NORCO) 5-325 mg per tablet Take 1 tablet by mouth every 6 (six) hours as needed for Pain. 20 tablet 0    insulin aspart U-100 (NOVOLOG) 100 unit/mL (3 mL) InPn pen Inject 1-10 Units into the skin before meals and at bedtime as needed (Hyperglycemia). 3 mL 3    isosorbide mononitrate (IMDUR) 60 MG 24 hr tablet Take 2 tablets (120 mg total) by mouth once daily. 30 tablet 1    LANTUS U-100 INSULIN 100 unit/mL injection SMARTSI Unit(s) SUB-Q Every Morning      levETIRAcetam (KEPPRA) 500 MG Tab Take 1 tablet (500 mg total) by  mouth 2 (two) times daily. 60 tablet 1    levoFLOXacin (LEVAQUIN) 500 MG tablet Take 1 tablet (500 mg total) by mouth every other day. for 1 day 1 tablet 0    polyethylene glycol (GLYCOLAX) 17 gram/dose powder Take 17 g by mouth 2 (two) times daily. 510 each 0    predniSONE (DELTASONE) 20 MG tablet Take 3 tablets (60 mg total) by mouth once daily for 4 days, THEN 2 tablets (40 mg total) once daily for 4 days, THEN 1 tablet (20 mg total) once daily for 4 days, THEN 0.5 tablets (10 mg total) once daily for 4 days. 26 tablet 0    [DISCONTINUED] atenoloL (TENORMIN) 50 MG tablet Take 1 tablet (50 mg total) by mouth every other day. 45 tablet 3    [DISCONTINUED] pantoprazole (PROTONIX) 40 MG tablet Take 1 tablet (40 mg total) by mouth once daily. 30 tablet 3    [DISCONTINUED] torsemide (DEMADEX) 20 MG Tab Take 1 tablet (20 mg total) by mouth 2 (two) times a day. (Patient taking differently: Take 20 mg by mouth once daily.) 60 tablet 1     Scheduled Meds:   amLODIPine  10 mg Oral Daily    carvediloL  25 mg Oral BID    ciprofloxacin  400 mg Intravenous Q24H    [START ON 7/31/2022] cloNIDine 0.2 mg/24 hr td ptwk  1 patch Transdermal Q7 Days    famotidine  20 mg Oral Daily    FLUoxetine  20 mg Oral Daily    furosemide  40 mg Oral BID    insulin detemir U-100  5 Units Subcutaneous Daily    isosorbide mononitrate  120 mg Oral Daily    levETIRAcetam  500 mg Oral BID    metronidazole  500 mg Intravenous Q8H     Continuous Infusions:   heparin (porcine) in D5W 18 Units/kg/hr (07/28/22 1852)     PRN Meds:.acetaminophen, dextrose 10%, dextrose 10%, glucagon (human recombinant), glucose, glucose, heparin (PORCINE), heparin (PORCINE), HYDROmorphone, insulin aspart U-100, naloxone, ondansetron, oxyCODONE, prochlorperazine    Allergies:  Penicillins    Past Family History:  Reviewed; refer to Hospitalist Admission Note    Review of Systems:  Review of Systems - All 14 systems reviewed and negative, except as noted in  "HPI    Physical Exam:    BP (!) 176/83   Pulse 85   Temp 97.8 °F (36.6 °C)   Resp 16   Ht 5' 2" (1.575 m)   Wt 74.8 kg (165 lb)   LMP 12/14/2021 (Approximate)   SpO2 (!) 94%   Breastfeeding No   BMI 30.18 kg/m²     General Appearance:    Alert, cooperative, no distress, appears stated age   Head:    Normocephalic, without obvious abnormality, atraumatic   Eyes:    PER, conjunctiva/corneas clear, EOM's intact in both eyes        Throat:   Lips, mucosa, and tongue normal; teeth and gums normal   Back:     Symmetric, no curvature, ROM normal, no CVA tenderness   Lungs:     Clear to auscultation bilaterally, respirations unlabored   Chest wall:    No tenderness or deformity   Heart:    Regular rate and rhythm, S1 and S2 normal, no murmur, rub   or gallop   Abdomen:     Soft, non-tender, bowel sounds active all four quadrants,     no masses, no organomegaly   Extremities:   Extremities normal, atraumatic, no cyanosis or edema   Pulses:   2+ and symmetric all extremities   MSK:   No joint or muscle swelling, tenderness or deformity   Skin:   Skin color, texture, turgor normal, no rashes or lesions   Neurologic:   CNII-XII intact, normal strength and sensation       Throughout.  No flap     Results:  Lab Results   Component Value Date     (L) 07/29/2022    K 4.7 07/29/2022     07/29/2022    CO2 19 (L) 07/29/2022    BUN 38 (H) 07/29/2022    CREATININE 4.5 (H) 07/29/2022    CALCIUM 8.0 (L) 07/29/2022    ANIONGAP 13 07/29/2022    ESTGFRAFRICA 14 (A) 07/29/2022    EGFRNONAA 12 (A) 07/29/2022       Lab Results   Component Value Date    CALCIUM 8.0 (L) 07/29/2022    PHOS 6.3 (H) 07/29/2022       Recent Labs   Lab 07/29/22  0716   WBC 9.65   RBC 3.49*   HGB 10.3*   HCT 29.6*   *   MCV 85   MCH 29.5   MCHC 34.8          I have personally reviewed pertinent radiological imaging and reports.       "

## 2022-07-29 NOTE — PLAN OF CARE
[facility]  Initial Discharge Assessment       Primary Care Provider: [facility]    Admission Diagnosis: Epigastric pain [R10.13]  Transaminitis [R74.01]  Nausea and vomiting in adult [R11.2]  Cholelithiasis without cholangitis [K80.20]    Admission Date: 7/28/2022  Expected Discharge Date:     Discharge Barriers Identified: None    Payor: MEDICAID / Plan: [place] [place] / Product Type: Managed Medicaid /     Extended Emergency Contact Information  Primary Emergency Contact: Garcia Howell  Mobile Phone: [phone]  Relation: Father  Preferred language: English   needed? No  Secondary Emergency Contact: arlen howell  Mobile Phone: [phone]  Relation: Step parent  Preferred language: English   needed? No    Discharge Plan A: Home  Discharge Plan B: Home with family      [id number] [place], [place] [facility] [address] [facility] [address]  [address] [address]  Phone: [phone] Fax: [phone]    SW met with patient at bedside to complete discharge planning assessment.  Patient alert and oriented xs 4 but appears to be in somewhat pain.  Patient verified all demographic information on facesheet is correct.  Patient verified PCP [facility].  Patient verified primary health insurance is [employer] [place] Medicaid.  Patient with NO home health but has listed DME.  Patient with NO POA or Living Will.  Patient not on dialysis or medication coumadin.  Patient with no 30 day admission.  Patient with no financial issues at this time.  Patient family will provide transportation upon discharge from facility.  Patient independent with ADLs, live with father in [place] (do not no address), drives self.      Initial Assessment (most recent)     Adult Discharge Assessment - 07/29/22 1303        Discharge Assessment    Assessment Type Discharge Planning Assessment     Confirmed/corrected address,  phone number and insurance Yes     Confirmed Demographics Correct on Facesheet     Source of Information patient     Communicated TATIANA with patient/caregiver Date not available/Unable to determine     Lives With parent(s)     Facility Arrived From: home     Do you expect to return to your current living situation? Yes     Do you have help at home or someone to help you manage your care at home? Yes     Who are your caregiver(s) and their phone number(s)? father     Prior to hospitilization cognitive status: Alert/Oriented     Current cognitive status: Alert/Oriented     Walking or Climbing Stairs Difficulty ambulation difficulty, requires equipment;stair climbing difficulty, requires equipment     Dressing/Bathing Difficulty none     Equipment Currently Used at Home walker, rolling;glucometer     Readmission within 30 days? Yes     Patient currently being followed by outpatient case management? No     Do you currently have service(s) that help you manage your care at home? No     Do you take prescription medications? Yes     Do you have prescription coverage? Yes     Do you have any problems affording any of your prescribed medications? TBD     Is the patient taking medications as prescribed? yes     Who is going to help you get home at discharge? father     How do you get to doctors appointments? car, drives self     Are you on dialysis? Yes     Dialysis Name and Scheduled days [facility] 11:30am     Do you take coumadin? No     Discharge Plan A Home     Discharge Plan B Home with family     DME Needed Upon Discharge  none     Discharge Plan discussed with: Patient     Discharge Barriers Identified None

## 2022-07-29 NOTE — PLAN OF CARE
Pt aaox4. Pt has remained NPO per md order for further testing. Pt had hida scan today and rec'd dialysis at bed side.  Pt had adjustment of Heparin drip 10:30 AM PTT 34.0  rec'd bolus of 30 units/KG then increased by 2 units/KG/HR Current heparin increased 12 units/ kg/hr.  New iv start to left ac . Hemodialysis port noted to left chest.  Problem: Adult Inpatient Plan of Care  Goal: Plan of Care Review  Outcome: Ongoing, Progressing

## 2022-07-29 NOTE — PLAN OF CARE
Plan of care reviewed with pt at beginning of shift.  Pt/family verbalized understanding. Purposeful rounds completed on this pt throughout shift.  Pain monitored and covered as needed, no urine output this shift, repositions independently, safety maintained.  Patient has remained free from fall/injury, no skin breakdown noted.  Significant generalized edema noted.  Side rails up x2, bed in locked and lowest position, call light kept within reach.  Needs attended to, will continue to monitor/ update as indicated

## 2022-07-29 NOTE — NURSING
PT DIALYSIS TODAY WITH 3 LITERS PULLED OFF. PT DECLINED BLOOD SUGAR CHECK THIS AFTERNOON. SHE IS ON WAY TO 2ND PART OF HIDA SCAN.

## 2022-07-29 NOTE — NURSING
Pt PTT is 52.7 no change in dose will repeat labs at 11:45 pm/. MD aware of pt status and sn inquired about when to hold heparin due to scheduled surgery in am. Awaiting response.

## 2022-07-30 ENCOUNTER — ANESTHESIA (OUTPATIENT)
Dept: SURGERY | Facility: HOSPITAL | Age: 32
DRG: 417 | End: 2022-07-30
Payer: MEDICAID

## 2022-07-30 PROBLEM — I10 HTN (HYPERTENSION): Status: ACTIVE | Noted: 2022-07-30

## 2022-07-30 PROBLEM — K81.0 ACUTE CHOLECYSTITIS: Status: ACTIVE | Noted: 2022-07-30

## 2022-07-30 LAB
ALBUMIN SERPL BCP-MCNC: 1.8 G/DL (ref 3.5–5.2)
ALP SERPL-CCNC: 240 U/L (ref 55–135)
ALT SERPL W/O P-5'-P-CCNC: 129 U/L (ref 10–44)
ANION GAP SERPL CALC-SCNC: 11 MMOL/L (ref 8–16)
AST SERPL-CCNC: 43 U/L (ref 10–40)
BASOPHILS # BLD AUTO: 0.01 K/UL (ref 0–0.2)
BASOPHILS NFR BLD: 0.1 % (ref 0–1.9)
BILIRUB SERPL-MCNC: 0.5 MG/DL (ref 0.1–1)
BUN SERPL-MCNC: 40 MG/DL (ref 6–20)
CALCIUM SERPL-MCNC: 7.7 MG/DL (ref 8.7–10.5)
CHLORIDE SERPL-SCNC: 103 MMOL/L (ref 95–110)
CO2 SERPL-SCNC: 22 MMOL/L (ref 23–29)
CREAT SERPL-MCNC: 4.7 MG/DL (ref 0.5–1.4)
DIFFERENTIAL METHOD: ABNORMAL
EOSINOPHIL # BLD AUTO: 0.1 K/UL (ref 0–0.5)
EOSINOPHIL NFR BLD: 2.1 % (ref 0–8)
ERYTHROCYTE [DISTWIDTH] IN BLOOD BY AUTOMATED COUNT: 14.4 % (ref 11.5–14.5)
EST. GFR  (AFRICAN AMERICAN): 13 ML/MIN/1.73 M^2
EST. GFR  (NON AFRICAN AMERICAN): 11 ML/MIN/1.73 M^2
GLUCOSE SERPL-MCNC: 176 MG/DL (ref 70–110)
HCT VFR BLD AUTO: 25.9 % (ref 37–48.5)
HGB BLD-MCNC: 9 G/DL (ref 12–16)
IMM GRANULOCYTES # BLD AUTO: 0.08 K/UL (ref 0–0.04)
IMM GRANULOCYTES NFR BLD AUTO: 1.2 % (ref 0–0.5)
LYMPHOCYTES # BLD AUTO: 0.8 K/UL (ref 1–4.8)
LYMPHOCYTES NFR BLD: 12.5 % (ref 18–48)
MAGNESIUM SERPL-MCNC: 2.2 MG/DL (ref 1.6–2.6)
MCH RBC QN AUTO: 29.6 PG (ref 27–31)
MCHC RBC AUTO-ENTMCNC: 34.7 G/DL (ref 32–36)
MCV RBC AUTO: 85 FL (ref 82–98)
MONOCYTES # BLD AUTO: 0.6 K/UL (ref 0.3–1)
MONOCYTES NFR BLD: 8.3 % (ref 4–15)
NEUTROPHILS # BLD AUTO: 5.1 K/UL (ref 1.8–7.7)
NEUTROPHILS NFR BLD: 75.8 % (ref 38–73)
NRBC BLD-RTO: 0 /100 WBC
PHOSPHATE SERPL-MCNC: 5.4 MG/DL (ref 2.7–4.5)
PLATELET # BLD AUTO: 107 K/UL (ref 150–450)
PMV BLD AUTO: 10.3 FL (ref 9.2–12.9)
POCT GLUCOSE: 113 MG/DL (ref 70–110)
POCT GLUCOSE: 186 MG/DL (ref 70–110)
POCT GLUCOSE: 96 MG/DL (ref 70–110)
POTASSIUM SERPL-SCNC: 4 MMOL/L (ref 3.5–5.1)
PROT SERPL-MCNC: 4.7 G/DL (ref 6–8.4)
RBC # BLD AUTO: 3.04 M/UL (ref 4–5.4)
SODIUM SERPL-SCNC: 136 MMOL/L (ref 136–145)
WBC # BLD AUTO: 6.71 K/UL (ref 3.9–12.7)

## 2022-07-30 PROCEDURE — 80053 COMPREHEN METABOLIC PANEL: CPT | Performed by: INTERNAL MEDICINE

## 2022-07-30 PROCEDURE — 85025 COMPLETE CBC W/AUTO DIFF WBC: CPT | Performed by: INTERNAL MEDICINE

## 2022-07-30 PROCEDURE — 37000009 HC ANESTHESIA EA ADD 15 MINS: Performed by: SURGERY

## 2022-07-30 PROCEDURE — D9220A PRA ANESTHESIA: Mod: ANES,,, | Performed by: ANESTHESIOLOGY

## 2022-07-30 PROCEDURE — 63600175 PHARM REV CODE 636 W HCPCS: Performed by: NURSE ANESTHETIST, CERTIFIED REGISTERED

## 2022-07-30 PROCEDURE — S0030 INJECTION, METRONIDAZOLE: HCPCS | Performed by: INTERNAL MEDICINE

## 2022-07-30 PROCEDURE — 36415 COLL VENOUS BLD VENIPUNCTURE: CPT | Performed by: INTERNAL MEDICINE

## 2022-07-30 PROCEDURE — 80100014 HC HEMODIALYSIS 1:1

## 2022-07-30 PROCEDURE — 88304 TISSUE EXAM BY PATHOLOGIST: CPT | Performed by: PATHOLOGY

## 2022-07-30 PROCEDURE — 36000709 HC OR TIME LEV III EA ADD 15 MIN: Performed by: SURGERY

## 2022-07-30 PROCEDURE — 71000039 HC RECOVERY, EACH ADD'L HOUR: Performed by: SURGERY

## 2022-07-30 PROCEDURE — 25000003 PHARM REV CODE 250: Performed by: NURSE ANESTHETIST, CERTIFIED REGISTERED

## 2022-07-30 PROCEDURE — 47562 LAPAROSCOPIC CHOLECYSTECTOMY: CPT | Mod: ,,, | Performed by: SURGERY

## 2022-07-30 PROCEDURE — 12000002 HC ACUTE/MED SURGE SEMI-PRIVATE ROOM

## 2022-07-30 PROCEDURE — D9220A PRA ANESTHESIA: ICD-10-PCS | Mod: ANES,,, | Performed by: ANESTHESIOLOGY

## 2022-07-30 PROCEDURE — 25000003 PHARM REV CODE 250: Performed by: SURGERY

## 2022-07-30 PROCEDURE — 63600175 PHARM REV CODE 636 W HCPCS: Performed by: ANESTHESIOLOGY

## 2022-07-30 PROCEDURE — 94761 N-INVAS EAR/PLS OXIMETRY MLT: CPT

## 2022-07-30 PROCEDURE — 36000708 HC OR TIME LEV III 1ST 15 MIN: Performed by: SURGERY

## 2022-07-30 PROCEDURE — 83735 ASSAY OF MAGNESIUM: CPT | Performed by: INTERNAL MEDICINE

## 2022-07-30 PROCEDURE — 27201423 OPTIME MED/SURG SUP & DEVICES STERILE SUPPLY: Performed by: SURGERY

## 2022-07-30 PROCEDURE — 47562 PR LAP,CHOLECYSTECTOMY: ICD-10-PCS | Mod: ,,, | Performed by: SURGERY

## 2022-07-30 PROCEDURE — 88304 TISSUE EXAM BY PATHOLOGIST: CPT | Mod: 26,,, | Performed by: PATHOLOGY

## 2022-07-30 PROCEDURE — 27000221 HC OXYGEN, UP TO 24 HOURS

## 2022-07-30 PROCEDURE — 99900035 HC TECH TIME PER 15 MIN (STAT)

## 2022-07-30 PROCEDURE — 88304 PR  SURG PATH,LEVEL III: ICD-10-PCS | Mod: 26,,, | Performed by: PATHOLOGY

## 2022-07-30 PROCEDURE — 63600175 PHARM REV CODE 636 W HCPCS: Performed by: INTERNAL MEDICINE

## 2022-07-30 PROCEDURE — 25000003 PHARM REV CODE 250: Performed by: INTERNAL MEDICINE

## 2022-07-30 PROCEDURE — D9220A PRA ANESTHESIA: Mod: CRNA,,, | Performed by: NURSE ANESTHETIST, CERTIFIED REGISTERED

## 2022-07-30 PROCEDURE — 71000033 HC RECOVERY, INTIAL HOUR: Performed by: SURGERY

## 2022-07-30 PROCEDURE — 84100 ASSAY OF PHOSPHORUS: CPT | Performed by: INTERNAL MEDICINE

## 2022-07-30 PROCEDURE — 94799 UNLISTED PULMONARY SVC/PX: CPT

## 2022-07-30 PROCEDURE — 25000003 PHARM REV CODE 250: Performed by: HOSPITALIST

## 2022-07-30 PROCEDURE — 63600175 PHARM REV CODE 636 W HCPCS: Performed by: PSYCHIATRY & NEUROLOGY

## 2022-07-30 PROCEDURE — 37000008 HC ANESTHESIA 1ST 15 MINUTES: Performed by: SURGERY

## 2022-07-30 PROCEDURE — D9220A PRA ANESTHESIA: ICD-10-PCS | Mod: CRNA,,, | Performed by: NURSE ANESTHETIST, CERTIFIED REGISTERED

## 2022-07-30 RX ORDER — MUPIROCIN 20 MG/G
OINTMENT TOPICAL 2 TIMES DAILY
Status: DISCONTINUED | OUTPATIENT
Start: 2022-07-30 | End: 2022-08-02 | Stop reason: HOSPADM

## 2022-07-30 RX ORDER — SUCCINYLCHOLINE CHLORIDE 20 MG/ML
INJECTION INTRAMUSCULAR; INTRAVENOUS
Status: DISCONTINUED | OUTPATIENT
Start: 2022-07-30 | End: 2022-07-30

## 2022-07-30 RX ORDER — FUROSEMIDE 40 MG/1
40 TABLET ORAL 2 TIMES DAILY
Status: DISCONTINUED | OUTPATIENT
Start: 2022-07-31 | End: 2022-08-02 | Stop reason: HOSPADM

## 2022-07-30 RX ORDER — PROPOFOL 10 MG/ML
VIAL (ML) INTRAVENOUS
Status: DISCONTINUED | OUTPATIENT
Start: 2022-07-30 | End: 2022-07-30

## 2022-07-30 RX ORDER — FENTANYL CITRATE 50 UG/ML
INJECTION, SOLUTION INTRAMUSCULAR; INTRAVENOUS
Status: DISCONTINUED | OUTPATIENT
Start: 2022-07-30 | End: 2022-07-30

## 2022-07-30 RX ORDER — ROCURONIUM BROMIDE 10 MG/ML
INJECTION, SOLUTION INTRAVENOUS
Status: DISCONTINUED | OUTPATIENT
Start: 2022-07-30 | End: 2022-07-30

## 2022-07-30 RX ORDER — MIDAZOLAM HYDROCHLORIDE 1 MG/ML
INJECTION INTRAMUSCULAR; INTRAVENOUS
Status: DISCONTINUED | OUTPATIENT
Start: 2022-07-30 | End: 2022-07-30

## 2022-07-30 RX ORDER — FENTANYL CITRATE 50 UG/ML
25 INJECTION, SOLUTION INTRAMUSCULAR; INTRAVENOUS EVERY 5 MIN PRN
Status: DISCONTINUED | OUTPATIENT
Start: 2022-07-30 | End: 2022-07-30

## 2022-07-30 RX ORDER — LIDOCAINE HCL/PF 100 MG/5ML
SYRINGE (ML) INTRAVENOUS
Status: DISCONTINUED | OUTPATIENT
Start: 2022-07-30 | End: 2022-07-30

## 2022-07-30 RX ORDER — HYDROMORPHONE HYDROCHLORIDE 2 MG/ML
0.2 INJECTION, SOLUTION INTRAMUSCULAR; INTRAVENOUS; SUBCUTANEOUS EVERY 5 MIN PRN
Status: DISCONTINUED | OUTPATIENT
Start: 2022-07-30 | End: 2022-07-30

## 2022-07-30 RX ORDER — HEPARIN SODIUM 1000 [USP'U]/ML
4000 INJECTION, SOLUTION INTRAVENOUS; SUBCUTANEOUS ONCE
Status: COMPLETED | OUTPATIENT
Start: 2022-07-30 | End: 2022-07-30

## 2022-07-30 RX ORDER — ONDANSETRON HYDROCHLORIDE 2 MG/ML
INJECTION, SOLUTION INTRAMUSCULAR; INTRAVENOUS
Status: DISCONTINUED | OUTPATIENT
Start: 2022-07-30 | End: 2022-07-30

## 2022-07-30 RX ORDER — BUPIVACAINE HYDROCHLORIDE AND EPINEPHRINE 2.5; 5 MG/ML; UG/ML
INJECTION, SOLUTION EPIDURAL; INFILTRATION; INTRACAUDAL; PERINEURAL
Status: DISCONTINUED | OUTPATIENT
Start: 2022-07-30 | End: 2022-07-30 | Stop reason: HOSPADM

## 2022-07-30 RX ADMIN — MIDAZOLAM HYDROCHLORIDE 1 MG: 1 INJECTION, SOLUTION INTRAMUSCULAR; INTRAVENOUS at 10:07

## 2022-07-30 RX ADMIN — ROCURONIUM BROMIDE 5 MG: 10 INJECTION, SOLUTION INTRAVENOUS at 10:07

## 2022-07-30 RX ADMIN — FENTANYL CITRATE 25 MCG: 50 INJECTION INTRAMUSCULAR; INTRAVENOUS at 12:07

## 2022-07-30 RX ADMIN — SUCCINYLCHOLINE CHLORIDE 100 MG: 20 INJECTION, SOLUTION INTRAMUSCULAR; INTRAVENOUS; PARENTERAL at 10:07

## 2022-07-30 RX ADMIN — SODIUM CHLORIDE: 0.9 INJECTION, SOLUTION INTRAVENOUS at 09:07

## 2022-07-30 RX ADMIN — MUPIROCIN: 20 OINTMENT TOPICAL at 09:07

## 2022-07-30 RX ADMIN — CEFAZOLIN 2 G: 1 INJECTION, POWDER, FOR SOLUTION INTRAVENOUS at 10:07

## 2022-07-30 RX ADMIN — SUGAMMADEX 200 MG: 100 INJECTION, SOLUTION INTRAVENOUS at 11:07

## 2022-07-30 RX ADMIN — CARVEDILOL 25 MG: 25 TABLET, FILM COATED ORAL at 09:07

## 2022-07-30 RX ADMIN — LIDOCAINE HYDROCHLORIDE 50 MG: 20 INJECTION INTRAVENOUS at 10:07

## 2022-07-30 RX ADMIN — FENTANYL CITRATE 25 MCG: 50 INJECTION INTRAMUSCULAR; INTRAVENOUS at 11:07

## 2022-07-30 RX ADMIN — AMLODIPINE BESYLATE 10 MG: 5 TABLET ORAL at 08:07

## 2022-07-30 RX ADMIN — FENTANYL CITRATE 50 MCG: 50 INJECTION, SOLUTION INTRAMUSCULAR; INTRAVENOUS at 10:07

## 2022-07-30 RX ADMIN — LEVETIRACETAM 500 MG: 500 TABLET, FILM COATED ORAL at 08:07

## 2022-07-30 RX ADMIN — HEPARIN SODIUM 4000 UNITS: 1000 INJECTION, SOLUTION INTRAVENOUS; SUBCUTANEOUS at 04:07

## 2022-07-30 RX ADMIN — METRONIDAZOLE 500 MG: 500 INJECTION, SOLUTION INTRAVENOUS at 03:07

## 2022-07-30 RX ADMIN — LEVETIRACETAM 500 MG: 500 TABLET, FILM COATED ORAL at 09:07

## 2022-07-30 RX ADMIN — CARVEDILOL 25 MG: 25 TABLET, FILM COATED ORAL at 08:07

## 2022-07-30 RX ADMIN — INSULIN DETEMIR 5 UNITS: 100 INJECTION, SOLUTION SUBCUTANEOUS at 08:07

## 2022-07-30 RX ADMIN — ISOSORBIDE MONONITRATE 120 MG: 60 TABLET, EXTENDED RELEASE ORAL at 08:07

## 2022-07-30 RX ADMIN — HYDROMORPHONE HYDROCHLORIDE 1 MG: 2 INJECTION, SOLUTION INTRAMUSCULAR; INTRAVENOUS; SUBCUTANEOUS at 09:07

## 2022-07-30 RX ADMIN — PROCHLORPERAZINE EDISYLATE 5 MG: 5 INJECTION INTRAMUSCULAR; INTRAVENOUS at 03:07

## 2022-07-30 RX ADMIN — FENTANYL CITRATE 50 MCG: 50 INJECTION, SOLUTION INTRAMUSCULAR; INTRAVENOUS at 11:07

## 2022-07-30 RX ADMIN — HYDROMORPHONE HYDROCHLORIDE 1 MG: 2 INJECTION, SOLUTION INTRAMUSCULAR; INTRAVENOUS; SUBCUTANEOUS at 04:07

## 2022-07-30 RX ADMIN — HYDROMORPHONE HYDROCHLORIDE 1 MG: 2 INJECTION, SOLUTION INTRAMUSCULAR; INTRAVENOUS; SUBCUTANEOUS at 03:07

## 2022-07-30 RX ADMIN — ONDANSETRON 4 MG: 2 INJECTION, SOLUTION INTRAMUSCULAR; INTRAVENOUS at 10:07

## 2022-07-30 RX ADMIN — FLUOXETINE 20 MG: 20 CAPSULE ORAL at 08:07

## 2022-07-30 RX ADMIN — PROPOFOL 100 MG: 10 INJECTION, EMULSION INTRAVENOUS at 10:07

## 2022-07-30 NOTE — PLAN OF CARE
Patient in OR for lap cholecystectomy after positive HIDA scan. LFTs trending down and were likely reactive. Please call with questions.    Renata Holt MD

## 2022-07-30 NOTE — BRIEF OP NOTE
[facility]  Brief Operative Note    SUMMARY     Surgery Date: 7/30/2022     Surgeon(s) and Role:     * Grey Perez MD - Primary    Assisting Surgeon: None    Pre-op Diagnosis:  Epigastric pain [R10.13]    Post-op Diagnosis:  Post-Op Diagnosis Codes:     * Epigastric pain [R10.13]    Procedure(s) (LRB):  CHOLECYSTECTOMY, LAPAROSCOPIC (N/A) (22 modifier)      Anesthesia: General    Operative Findings: DIstended inflamed gallbladder.     Estimated Blood Loss: 50 cc's  Estimated Blood Loss has been documented.         Specimens:   Specimen (24h ago, onward)            None          [id number]

## 2022-07-30 NOTE — PROGRESS NOTES
Case d/w Dr Sawant yesterday  HIDA scan positive yesterday.   As such will plan lap freedom today.    D/w pt regarding risks of surgery.   INformed consent obtained.    Proceed with surgery

## 2022-07-30 NOTE — PLAN OF CARE
Plan of care reviewed with patient. Patient verbalized complete understanding. Pt awake, alert, and oriented. Pt in severe pain. Pt had one episode of vomiting, meds given. Pt NPO after midnight, heparin held.   All fall precautions maintained, bed in lowest position, locked, call light within reach. Side rails up times 2. Slip resistant socks maintained.

## 2022-07-30 NOTE — HOSPITAL COURSE
32F h/o ESRD on HD, DMII, dCHF, HTN p/w one day of abdominal pain found to have elevated LFTs after being discharged the day before for a hospitalization for PNA, DVT of the LUE and severe HTN.    There is concern that the patient may have acute cholecystitis.  Patient is currently undergoing HIDA scan.  Should this be consistent with acute cholecystitis the patient is NPO at midnight with intention to go to surgery on 07/30/2022.

## 2022-07-30 NOTE — ANESTHESIA PREPROCEDURE EVALUATION
07/30/2022  Tabby Howard is a 32 y.o., female.      Pre-op Assessment    I have reviewed the Patient Summary Reports.     I have reviewed the Nursing Notes. I have reviewed the NPO Status.   I have reviewed the Medications.     Review of Systems  Anesthesia Hx:  No problems with previous Anesthesia    Social:  Non-Smoker    Hematology/Oncology:         -- Anemia: Hematology Comments: Sickle cell trait, H/H 8/24, plt's 124k     Cardiovascular:   Hypertension, well controlled Dysrhythmias (SVT) CHF ECG has been reviewed. Pericardial effusion  CTA -   Impression:     Continued moderate pericardial effusion.  Mild cardiomegaly.  No CTA evidence of pulmonary embolus or aortic dissection.  Decreased right pleural effusion, patchy interstitial infiltrate in the lung fields bilaterally.   Pulmonary:   Pneumonia Pleural effusion   Renal/:   Chronic Renal Disease, ESRD, Dialysis, CRI    Hepatic/GI:   GERD    Neurological:   Seizures    Endocrine:   Diabetes Gucose 170mg/dl Obesity / BMI > 30, Morbid Obesity / BMI > 40      Physical Exam  General: Well nourished, Cooperative, Alert and Oriented    Airway:  Mallampati: I   Mouth Opening: Normal  Neck ROM: Normal ROM        Anesthesia Plan  Type of Anesthesia, risks & benefits discussed:    Anesthesia Type: Gen ETT  Intra-op Monitoring Plan: Standard ASA Monitors  Post Op Pain Control Plan: multimodal analgesia and IV/PO Opioids PRN  Induction:  IV  Airway Plan: Direct, Post-Induction  Informed Consent: Informed consent signed with the Patient and all parties understand the risks and agree with anesthesia plan.  All questions answered.   ASA Score: 4 Emergent  Day of Surgery Review of History & Physical: H&P Update referred to the surgeon/provider.    Ready For Surgery From Anesthesia Perspective.     .

## 2022-07-30 NOTE — PROGRESS NOTES
INPATIENT NEPHROLOGY Progress Note  [facility]    Tabby Howard  07/30/2022    Reason for consultation:    esrd    Chief Complaint:   Chief Complaint   Patient presents with    Abdominal Pain     Abdominal pain and chest pain since this am was just discharged on Wednesday           History of Present Illness:    32F h/o ESRD on HD, DMII, dCHF, HTN p/w one day of abdominal pain found to have elevated LFTs after being discharged the day before for a hospitalization for PNA, DVT of the LUE and severe HTN.      7/29 She denies neurologic symptoms, fever, urinary or bowel complaints or cough.  She has mild shortness of breath.  She has constant abdominal pain with no exacerbating or alleviating factors.  7/30 went for lap freedom; seen on HD- tolerating    Plan of Care:     ESRD on HD TTS  - ordered HD TTS    HTN/Hypervolemia  - ordered 2-4L UF  - resume diuretic    Acute freedom s/p lap freedom on 7/30    SHPT  - ordered renal diet    Anemia of CKD  - ordered SHELLEY with HD TTS    Thank you for allowing us to participate in this patient's care. We will continue to follow.    Vital Signs:  Temp Readings from Last 3 Encounters:   07/30/22 98.1 °F (36.7 °C) (Axillary)   07/27/22 98.5 °F (36.9 °C) (Oral)   07/22/22 98.7 °F (37.1 °C) (Temporal)       Pulse Readings from Last 3 Encounters:   07/30/22 74   07/27/22 81   07/22/22 91       BP Readings from Last 3 Encounters:   07/30/22 (!) 151/78   07/27/22 (!) 146/72   07/22/22 (!) 153/95       Weight:  Wt Readings from Last 3 Encounters:   07/30/22 74.8 kg (165 lb)   07/26/22 75.2 kg (165 lb 12.6 oz)   07/22/22 74.8 kg (165 lb)       Medications:  Current Facility-Administered Medications on File Prior to Encounter   Medication Dose Route Frequency Provider Last Rate Last Admin    fentaNYL 50 mcg/mL injection   Intravenous PRN Jann Randall Jr., CRNA   100 mcg at 07/22/22 1502    midazolam injection   Intravenous PRN Jann Randall Jr., CRNA   2 mg at 07/22/22  1502    propofol (DIPRIVAN) 10 mg/mL infusion   Intravenous PRN Jann LEON Tonia Jr., CRNA   100 mg at 22 1500    rocuronium injection   Intravenous PRN Jann LEON Tonia Jr., CRNA   20 mg at 22 1502    succinylcholine injection   Intravenous PRN Jann LEON Tonia Jr., CRNA   120 mg at 22 1500     Current Outpatient Medications on File Prior to Encounter   Medication Sig Dispense Refill    amLODIPine (NORVASC) 10 MG tablet Take 1 tablet (10 mg total) by mouth once daily. 30 tablet 11    apixaban (ELIQUIS) 5 mg Tab TAKE 2 TABLETS BY MOUTH TWICE DAILY FOR 7 DAYS THEN 1 TABLET TWICE DAILY THERE AFTER 74 tablet 0    [START ON 2022] apixaban (ELIQUIS) 5 mg Tab Take 1 tablet (5 mg total) by mouth 2 (two) times daily. For second month on. 60 tablet 2    carvediloL (COREG) 25 MG tablet Take 1 tablet (25 mg total) by mouth 2 (two) times daily. 60 tablet 2    [START ON 2022] cloNIDine 0.2 mg/24 hr td ptwk (CATAPRES) 0.2 mg/24 hr Place 1 patch onto the skin every 7 days. 4 patch 2    famotidine (PEPCID) 20 MG tablet Take 1 tablet (20 mg total) by mouth once daily. 30 tablet 0    FLUoxetine 20 MG capsule Take 1 capsule (20 mg total) by mouth once daily. 30 capsule 1    furosemide (LASIX) 40 MG tablet Take 1 tablet (40 mg total) by mouth 2 (two) times daily. 60 tablet 0    hydrALAZINE (APRESOLINE) 100 MG tablet Take 1 tablet (100 mg total) by mouth every 8 (eight) hours. 90 tablet 2    HYDROcodone-acetaminophen (NORCO) 5-325 mg per tablet Take 1 tablet by mouth every 6 (six) hours as needed for Pain. 20 tablet 0    insulin aspart U-100 (NOVOLOG) 100 unit/mL (3 mL) InPn pen Inject 1-10 Units into the skin before meals and at bedtime as needed (Hyperglycemia). 3 mL 3    isosorbide mononitrate (IMDUR) 60 MG 24 hr tablet Take 2 tablets (120 mg total) by mouth once daily. 30 tablet 1    LANTUS U-100 INSULIN 100 unit/mL injection SMARTSI Unit(s) SUB-Q Every Morning      levETIRAcetam  "(KEPPRA) 500 MG Tab Take 1 tablet (500 mg total) by mouth 2 (two) times daily. 60 tablet 1    levoFLOXacin (LEVAQUIN) 500 MG tablet Take 1 tablet (500 mg total) by mouth every other day. for 1 day 1 tablet 0    polyethylene glycol (GLYCOLAX) 17 gram/dose powder Take 17 g by mouth 2 (two) times daily. 510 each 0    predniSONE (DELTASONE) 20 MG tablet Take 3 tablets (60 mg total) by mouth once daily for 4 days, THEN 2 tablets (40 mg total) once daily for 4 days, THEN 1 tablet (20 mg total) once daily for 4 days, THEN 0.5 tablets (10 mg total) once daily for 4 days. 26 tablet 0    [DISCONTINUED] atenoloL (TENORMIN) 50 MG tablet Take 1 tablet (50 mg total) by mouth every other day. 45 tablet 3    [DISCONTINUED] pantoprazole (PROTONIX) 40 MG tablet Take 1 tablet (40 mg total) by mouth once daily. 30 tablet 3    [DISCONTINUED] torsemide (DEMADEX) 20 MG Tab Take 1 tablet (20 mg total) by mouth 2 (two) times a day. (Patient taking differently: Take 20 mg by mouth once daily.) 60 tablet 1     Scheduled Meds:   amLODIPine  10 mg Oral Daily    carvediloL  25 mg Oral BID    [START ON 7/31/2022] cloNIDine 0.2 mg/24 hr td ptwk  1 patch Transdermal Q7 Days    famotidine  20 mg Oral Daily    FLUoxetine  20 mg Oral Daily    insulin detemir U-100  5 Units Subcutaneous Daily    isosorbide mononitrate  120 mg Oral Daily    levETIRAcetam  500 mg Oral BID    mupirocin   Nasal BID     Continuous Infusions:    PRN Meds:.acetaminophen, dextrose 10%, dextrose 10%, fentaNYL, glucagon (human recombinant), glucose, glucose, HYDROmorphone, HYDROmorphone, insulin aspart U-100, naloxone, ondansetron, oxyCODONE, prochlorperazine    Review of Systems:  Neg    Physical Exam:    BP (!) 151/78   Pulse 74   Temp 98.1 °F (36.7 °C) (Axillary)   Resp 16   Ht 5' 2" (1.575 m)   Wt 74.8 kg (165 lb)   LMP 12/14/2021 (Approximate)   SpO2 98%   Breastfeeding No   BMI 30.18 kg/m²     Constitutional: nad, aao x 3  Heart: rrr, no m/r/g, wwp, " no edema  Lungs: ctab, no w/r/r/c, no lb  Abdomen: s/nt/nd, +BS    Results:  Lab Results   Component Value Date     07/30/2022    K 4.0 07/30/2022     07/30/2022    CO2 22 (L) 07/30/2022    BUN 40 (H) 07/30/2022    CREATININE 4.7 (H) 07/30/2022    CALCIUM 7.7 (L) 07/30/2022    ANIONGAP 11 07/30/2022    ESTGFRAFRICA 13 (A) 07/30/2022    EGFRNONAA 11 (A) 07/30/2022       Lab Results   Component Value Date    CALCIUM 7.7 (L) 07/30/2022    PHOS 5.4 (H) 07/30/2022       Recent Labs   Lab 07/30/22  0527   WBC 6.71   RBC 3.04*   HGB 9.0*   HCT 25.9*   *   MCV 85   MCH 29.6   MCHC 34.7          I have personally reviewed pertinent radiological imaging and reports.     Patient care time was spent personally by me on the following activities: > 35 minutes  · Obtaining a history.  · Examination of patient.  · Providing medical care at the patients bedside.  · Developing a treatment plan with patient or surrogate and bedside caregivers.  · Ordering and reviewing laboratory studies, radiographic studies, pulse oximetry.  · Ordering and performing treatments and interventions.  · Evaluation of patient's response to treatment.  · Discussions with consultants while on the unit and immediately available to the patient.  · Re-evaluation of the patient's condition.  · Documentation in the medical record.    Prateek Clark MD MPH  [facility] [address] [address]  [phone] (p)  [phone] (f)

## 2022-07-30 NOTE — PROGRESS NOTES
07/30/22 1626   Required for all Hemodialysis Patients   Hepatitis Status negative   Handoff Report   Received From SHAILA Caraballo   Given To KINGSLEY Zamora   Treatment Type   Treatment Type Acute   Vital Signs   Temp 98.3 °F (36.8 °C)   Temp src Axillary   Pulse 78   Heart Rate Source Monitor   Resp 16   BP Location Right leg   BP Method Automatic   Patient Position Lying   Assessments (Pre/Post)   Consent Obtained yes   Safety vein preservation armband present   Date Hepatitis Profile Obtained 07/07/22   Blood Liters Processed (BLP) 68   Transport Modality bed   Level of Consciousness (AVPU) responds to voice   Dialyzer Clearance clear        Hemodialysis Catheter 07/26/22 1457 right internal jugular   Placement Date/Time: 07/26/22 1457   Present Prior to Hospital Arrival?: No  Hand Hygiene: Performed  Barrier Precautions: Performed  Skin Antisepsis: ChloraPrep  Hemodialysis Catheter Type: Tunneled catheter  Location: right internal jugular  Cathete...   Verification by X-ray Yes   Site Assessment No drainage;No redness;No swelling;No warmth   Line Securement Device Secured with sutures   Dressing Type Biopatch in place   Dressing Status Clean;Dry;Intact   Dressing Intervention Integrity maintained   Date on Dressing 07/27/22   Dressing Due to be Changed 08/03/22   Venous Patency/Care flushed w/o difficulty   Arterial Patency/Care flushed w/o difficulty   Line Necessity Review CRRT/HD   Post-Hemodialysis Assessment   Rinseback Volume (mL) 250 mL   Blood Volume Processed (Liters) 68 L   Dialyzer Clearance Clear   Additional Fluid Intake (mL) 250 mL   Total UF (mL) 3500 mL   Net Fluid Removal 3000   Patient Response to Treatment tolerated without diff   Post-Treatment Weight 71.7 kg (158 lb 1.1 oz)   Treatment Weight Change -3.1   Post-Hemodialysis Comments Report provided to SHAILA Zamora Bedside.   Educated patient on infection prevention with treatment initiation and disconnection.  Verbalized understanding.

## 2022-07-30 NOTE — SUBJECTIVE & OBJECTIVE
Interval History: HIDA with cholecytitis. Lap freedom planned for today. Still with abdominal pain    Review of Systems   Constitutional:  Negative for chills and fever.   HENT:  Negative for congestion and rhinorrhea.    Respiratory:  Negative for cough, shortness of breath and wheezing.    Cardiovascular:  Negative for chest pain, palpitations and leg swelling.   Gastrointestinal:  Positive for abdominal pain and nausea. Negative for abdominal distention and vomiting.   Endocrine: Negative for cold intolerance and heat intolerance.   Musculoskeletal:  Negative for arthralgias and neck stiffness.   Skin:  Negative for rash and wound.   Neurological:  Negative for dizziness and weakness.   Objective:     Vital Signs (Most Recent):  Temp: 98.1 °F (36.7 °C) (07/30/22 1300)  Pulse: 74 (07/30/22 1400)  Resp: 16 (07/30/22 1300)  BP: 130/80 (07/30/22 1400)  SpO2: 98 % (07/30/22 1300) Vital Signs (24h Range):  Temp:  [96.8 °F (36 °C)-99.4 °F (37.4 °C)] 98.1 °F (36.7 °C)  Pulse:  [69-82] 74  Resp:  [10-18] 16  SpO2:  [94 %-99 %] 98 %  BP: (120-183)/() 130/80     Weight: 74.8 kg (165 lb)  Body mass index is 30.18 kg/m².    Intake/Output Summary (Last 24 hours) at 7/30/2022 1406  Last data filed at 7/30/2022 1230  Gross per 24 hour   Intake 1894.45 ml   Output 3950 ml   Net -2055.55 ml      Physical Exam  Constitutional:       Appearance: She is well-developed.   HENT:      Head: Normocephalic and atraumatic.   Eyes:      Conjunctiva/sclera: Conjunctivae normal.      Pupils: Pupils are equal, round, and reactive to light.   Neck:      Thyroid: No thyromegaly.      Vascular: No JVD.   Cardiovascular:      Rate and Rhythm: Normal rate and regular rhythm.      Heart sounds: No murmur heard.    No friction rub. No gallop.   Pulmonary:      Effort: Pulmonary effort is normal.      Breath sounds: No wheezing.   Abdominal:      General: Bowel sounds are normal. There is no distension.      Palpations: Abdomen is soft. There is  no mass.      Tenderness: There is abdominal tenderness.   Musculoskeletal:         General: Normal range of motion.      Cervical back: Neck supple.   Skin:     General: Skin is warm and dry.   Neurological:      Mental Status: She is oriented to person, place, and time.      Cranial Nerves: No cranial nerve deficit.   Psychiatric:         Behavior: Behavior normal.       Significant Labs: All pertinent labs within the past 24 hours have been reviewed.    Significant Imaging: I have reviewed all pertinent imaging results/findings within the past 24 hours.

## 2022-07-30 NOTE — CONSULTS
[facility]     CC: Abdominal pain    HPI 32 y.o. female who presents to the ED with an onset of mid upper CP and abdominal pain. Patient was discharged from hospital yesterday. With Hx of ESRD, she was initiated on hemodialysis 2 weeks ago. She was scheduled to undergo replacement of malfunctioning Apolinar catheter. While in preop, patient decompensated and was referred to ED where she was found to have PNA. She was admitted to ICU and intubated. After being weaned off ventilator, she was discharged home. Patient was dialyzed while in the hospital yesterday and was scheduled to be dialyzed again today. However, she has been experiencing abdominal pain that radiates to mid chest, as well as N/V since last night. The patient denies any other symptoms at this time. PMHx of sickle cell anemia, end-stage renal disease (on hemodialysis every Tuesday/Thursday/Saturday), diabetes mellitus type 2, gastroparesis and hypertension. PSHx of placement of dual-lumen vascular catheter.    FURTHER HISTORY:  Above obtained from independent review of records from admitting provider as well as from direct discussion with ED physician who states patient CBD normal on imaging.  In addition, on my interview, I note the following:  Patient admits to abdominal pain, recent onset, ongoing since discharge from hospital, severe, with associated abnormal LFTs and anasarca.  No alleviating/exacerbating factors.      Past Medical History:   Diagnosis Date    CKD (chronic kidney disease), stage IV 4/12/2022    Diabetes mellitus due to underlying condition with unspecified complications 4/12/2022    Gastroparesis 4/12/2022    Heart failure with preserved ejection fraction 4/12/2022    EF 55% on 3/22    History of gastroesophageal reflux (GERD)     History of supraventricular tachycardia     Hyperkalemia 7/7/2022    Hypertensive emergency 7/8/2022    Sickle cell trait 4/12/2022    Type 2 diabetes mellitus        Past Surgical  "History:   Procedure Laterality Date     SECTION      x 3    ESOPHAGOGASTRODUODENOSCOPY N/A 10/18/2019    Procedure: ESOPHAGOGASTRODUODENOSCOPY (EGD);  Surgeon: Gianluca Mendez MD;  [facility];  Service: Endoscopy;  Laterality: N/A;    PLACEMENT OF DUAL-LUMEN VASCULAR CATHETER Left 2022    Procedure: INSERTION-CATHETER-JOSEPH;  Surgeon: Dionte Gan MD;  Location: [facility] OR;  Service: General;  Laterality: Left;    PLACEMENT OF DUAL-LUMEN VASCULAR CATHETER Right 2022    Procedure: INSERTION-CATHETER-Hemosplit;  Surgeon: Dionte Gan MD;  Location: [facility] OR;  Service: General;  Laterality: Right;       Social History     Tobacco Use    Smoking status: Never Smoker    Smokeless tobacco: Never Used   Substance Use Topics    Alcohol use: No    Drug use: No       Family History   Problem Relation Age of Onset    Diabetes Mother     Diabetes Father        Review of Systems  General ROS: negative for - chills, weight loss  Psychological ROS: negative for - hallucination, depression or suicidal ideation  Ophthalmic ROS: negative for - blurry vision, photophobia or eye pain  ENT ROS: negative for - epistaxis, sore throat or rhinorrhea  Respiratory ROS: no cough, shortness of breath, or wheezing  Cardiovascular ROS: no chest pain or dyspnea on exertion  Gastrointestinal ROS: + abdominal pain  Genito-Urinary ROS: no dysuria, trouble voiding, or hematuria  Musculoskeletal ROS: negative for - arthralgia, myalgia, weakness  Neurological ROS: no syncope or seizures; no ataxia  Dermatological ROS: negative for pruritis, rash and jaundice    Physical Examination  BP (!) 174/89   Pulse 81   Temp 97.8 °F (36.6 °C) (Oral)   Resp 16   Ht 5' 2" (1.575 m)   Wt 74.8 kg (165 lb)   LMP 2021 (Approximate)   SpO2 96%   Breastfeeding No   BMI 30.18 kg/m²   General appearance: alert, cooperative, no distress but ill appearing and uncomfortable  HENT: Normocephalic, atraumatic, neck " symmetrical, no nasal discharge   Eyes: conjunctivae/corneas clear, PERRL, EOM's intact, sclera anicteric  Lungs: clear to auscultation bilaterally, no dullness to percussion bilaterally, symmetric expansion, breathing unlabored  Heart: regular rate and rhythm without rub; no displacement of the PMI   Abdomen: obese with TTP in upper abdomen  Extremities: extremities symmetric; no clubbing, cyanosis, + edema  Integument: Skin color, texture, turgor normal; no rashes; hair distrubution normal, no jaundice + diffuse edema  Neurologic: Alert and oriented X 3, no focal sensory or motor neurologic deficits  Psychiatric: no pressured speech; normal affect; no evidence of impaired cognition, no anxiety/depression     Labs:  Lab Results   Component Value Date    WBC 9.65 07/29/2022    HGB 10.3 (L) 07/29/2022    HCT 29.6 (L) 07/29/2022    MCV 85 07/29/2022     (L) 07/29/2022         Lab Results   Component Value Date    INR 1.0 07/28/2022    INR 1.0 03/03/2020     CMP  Sodium   Date Value Ref Range Status   07/29/2022 135 (L) 136 - 145 mmol/L Final     Potassium   Date Value Ref Range Status   07/29/2022 4.7 3.5 - 5.1 mmol/L Final     Chloride   Date Value Ref Range Status   07/29/2022 103 95 - 110 mmol/L Final     CO2   Date Value Ref Range Status   07/29/2022 19 (L) 23 - 29 mmol/L Final     Glucose   Date Value Ref Range Status   07/29/2022 255 (H) 70 - 110 mg/dL Final     BUN   Date Value Ref Range Status   07/29/2022 38 (H) 6 - 20 mg/dL Final     Creatinine   Date Value Ref Range Status   07/29/2022 4.5 (H) 0.5 - 1.4 mg/dL Final     Calcium   Date Value Ref Range Status   07/29/2022 8.0 (L) 8.7 - 10.5 mg/dL Final     Total Protein   Date Value Ref Range Status   07/29/2022 4.7 (L) 6.0 - 8.4 g/dL Final     Albumin   Date Value Ref Range Status   07/29/2022 1.7 (L) 3.5 - 5.2 g/dL Final     Total Bilirubin   Date Value Ref Range Status   07/29/2022 0.6 0.1 - 1.0 mg/dL Final     Comment:     For infants and newborns,  interpretation of results should be based  on gestational age, weight and in agreement with clinical  observations.    Premature Infant recommended reference ranges:  Up to 24 hours.............<8.0 mg/dL  Up to 48 hours............<12.0 mg/dL  3-5 days..................<15.0 mg/dL  6-29 days.................<15.0 mg/dL       Alkaline Phosphatase   Date Value Ref Range Status   07/29/2022 316 (H) 55 - 135 U/L Final     AST   Date Value Ref Range Status   07/29/2022 62 (H) 10 - 40 U/L Final     ALT   Date Value Ref Range Status   07/29/2022 186 (H) 10 - 44 U/L Final     Anion Gap   Date Value Ref Range Status   07/29/2022 13 8 - 16 mmol/L Final     eGFR if    Date Value Ref Range Status   07/29/2022 14 (A) >60 mL/min/1.73 m^2 Final     eGFR if non    Date Value Ref Range Status   07/29/2022 12 (A) >60 mL/min/1.73 m^2 Final     Comment:     Calculation used to obtain the estimated glomerular filtration  rate (eGFR) is the CKD-EPI equation.            Imaging:  Imaging was independently visualized and reviewed by me and showed normal caliber CBD with possible cholecystitis.    I have personally reviewed these images    Case discussed as above.    Assessment:   1.  Abdominal pain  2.  Possible cholecystitis  3.  Anasarca  4.  ESRD    Plan:  1.  Agree with HIDA  2.  Agree with antibiotics  3.  Symptomatic management for pain/nausea  4.  Further recommendations to follow after above.  5.  Communication will be sent to the referring provider regarding my assessment and plan on this patient via EPIC.      Marcelo Aponte MD  [facility]  [address], [address]  [place] [address]  Office: [phone]  Fax: [phone]

## 2022-07-30 NOTE — PROGRESS NOTES
"Daily Progress Note  [facility]      Tabby Howard (MRN: [id number])  Admitting Service: Hospital Medicine  Date of Admission: 7/28/2022   Primary Care Provider: [facility]    ?  Chief complaint: Abdominal Pain   ?  History of Present Illness:       32F h/o ESRD on HD, DMII, dCHF, HTN p/w one day of abdominal pain found to have elevated LFTs after being discharged the day before for a hospitalization for PNA, DVT of the LUE and severe HTN.    After discharge yesterday, the patient developed right upper quadrant pain which thereafter became constant and by the day of admission was rated as severe and was associated with vomiting.  She has experienced mild relief from pain medications given in the ER.    No chest pain, shortness a breath or lightheadedness.  She was unable to dialysis on 07/28 as she presented here for her pain  ??  Subjective:    Still with abdominal pain.  Still nauseated.    No chest pain, shortness of breath, lightheadedness.         ?  PHYSICAL EXAM  Vitals: BP (!) 163/90 (BP Location: Right leg, Patient Position: Lying)   Pulse 80   Temp 97.8 °F (36.6 °C) (Oral)   Resp 18   Ht 5' 2" (1.575 m)   Wt 74.8 kg (165 lb)   LMP 12/14/2021 (Approximate)   SpO2 96%   Breastfeeding No   BMI 30.18 kg/m²  Body mass index is 30.18 kg/m².    General: In pain , AO3  HEENT: PERRL, EOMI.   Cardiovascular: RRR  Respiratory: lungs CTAB  GI: abdomen S/RUQ tenderness without rebound or guarding/ND, +BS  Extremities: no edema  MSK: moves upper extremities.   Neuro/Psych: A&OX3. CNII-XII grossly intact.      EKG and radiographs from the past 24h: reviewed and personally interpreted if available.      LABS    Recent Labs     07/29/22  0716   WBC 9.65   HGB 10.3*   *     ?  Recent Labs     07/29/22  0716   *   K 4.7   CO2 19*   BUN 38*   CREATININE 4.5*   MG 2.1   PHOS 6.3*     ?  Recent Labs     07/29/22  0716   BILITOT 0.6   AST 62*   * "   ALKPHOS 316*   PROT 4.7*     ?  Recent Labs     07/28/22  1200   INR 1.0     ?    ASSESSMENT & PLAN    32F h/o ESRD on HD, DMII, dCHF, HTN p/w one day of abdominal pain found to have elevated LFTs after being discharged the day before for a hospitalization for PNA, DVT of the LUE and severe HTN.    There is concern that the patient may have acute cholecystitis.  Patient is currently undergoing HIDA scan.  Should this be consistent with acute cholecystitis the patient is NPO at midnight with intention to go to surgery on 07/30/2022.      1. Abnormal LFTs with possible acute cholecystitis   -hepatitis panel  -cipro/flagyl  -HIDA  -GI, general surgery consult    2. ESRD on HD  -renal consult    3. HTN  -resume home regimen except currently holding hydralazine    4. DMII  -A1C reviewed  -levemir 5 + ISS    5. DVT  -heparin gtt.  To be held at midnight    6. Elevated troponin  -without chest elen  -trend         Code Status/Dispo: Full Code.   ?  Qing Breen    Date: 7/29/2022

## 2022-07-30 NOTE — PLAN OF CARE
AAOx3. NAD. VSS. Calm and cooperative. Speech appropriate. Tolerating clear liquids. Denies nausea. NSR. Resp E/U. BBS clear. 96% on 2L O2. 22G IV to Rt wrist SL with dressing CDI. 20G Iv to Rt AC SL with dressing CDI. Dialysis cath to rt jugular CDI. No swelling or redness. Incision x4 to abdomen CDI. Family notified of patient status. All safety measures taken. Bed in low position. Bedrails up x2. Bed locked.

## 2022-07-30 NOTE — PLAN OF CARE
POC/Meds reviewed, pt verbalized understanding. Vitals stable. Afebrile. Remains on 2L O2. IVPB abx administered. Tele In place-NSR. Up with x1 assist to BSC. IS at bedside, instructed on use and return demonstration performed. PRN pain meds administered. Dialysis completed, 3L removed. Repositions self. Hourly/Q2hr rounding performed, safety maintained. Bed in lowest position, wheels locked, SR up x2, call light in easy reach. No complaints at this time. Will continue to monitor.

## 2022-07-30 NOTE — OP NOTE
Date of surgery:  7/30/22    Staff surgeon:  Dr. Grey Perez    Preoperative diagnosis:  Acute cholecystitsi    Postoperative diagnosis:  The same    Procedure:  Laparoscopic cholecystectomy (22 modifier)    Anesthesia:  General endotracheal anesthesia    Indication for procedure:  Pleasant 32-year-old female presented to the ER with signs symptoms suggestive of cholecystitis which was confirmed with HIDA.  She is scheduled for laparoscopic cholecystectomy the above-mentioned date.    Description of procedure:  Following signing informed consent patient in the operating room placed supine position.  General endotracheal anesthesia was administered without event.  The abdomen is prepped and draped in standard fashion and appropriate time-out procedure was then performed.  Next a  small transverse incision above the umbilicus is made and carried down to  the deep dermal tissue.  The abdominal cavity is entered with a  Veress needle and pneumoperitoneum to 15 mmHg is established.  Next the abdomen is entered with an Optiview trocar under direct visualization.  Patient placed in reverse Trendelenburg and tilted towards the left side.  A 5 mm is placed in the epigastric trocar and 2 in the right subcostal margin.  All trocars were placed under direct visualization and after injection of half percent Marcaine with epi.  The gallbladder is distended and firm.  Needle decompression is performed removing 50 cc's of blood stained bile.  The fundus was grasped and held over the patient's liver.  This exposed the infundibulum which was grasped and held towards the patient's feet.  Next the triangle of Calot is dissected identifying the cystic duct and cystic artery in their usual anatomic locations. Two clips are placed distally on the cystic duct 1 clip proximally.  Two clips are placed on the cystic artery. The duct and artery are cut between clips.  Next cautery is used to remove the gallbladder from the hepatic fossa. The  posterior wall of the gallbladder is necrotic and there is significant induration and inflammation to the hepatic fossa.  This adds increased difficulty to the case requiring attention to bleeding to the hepatic fossa and irrigation of bilious contents.  22 modifier is appropriate.   The gallbladder is then removed from the abdominal cavity with assistance of an Endo-Catch bag.  Having removed the gallbladder the hepatic fossa is evaluated  once again to ensure adequate hemostasis.  Next all trocars are removed under direct visualization.  I closed the umbilical fascia with 0 Vicryl suture.  Skin incision was closed with 4-0 Monocryl.  Patient is extubated taken recovery room stable condition.  Blood loss was 50 cc's

## 2022-07-30 NOTE — PROGRESS NOTES
[facility] Hospital Medicine  Progress Note    Patient Name: Tabby Howard  MRN: [id number]  Patient Class: IP- Inpatient   Admission Date: 7/28/2022  Length of Stay: 1 days  Attending Physician: Linda Cueto MD  Primary Care Provider: [facility]        Subjective:     Principal Problem:Acute cholecystitis        HPI:  32F h/o ESRD on HD, DMII, dCHF, HTN p/w one day of abdominal pain found to have elevated LFTs after being discharged the day before for a hospitalization for PNA, DVT of the LUE and severe HTN.     After discharge yesterday, the patient developed right upper quadrant pain which thereafter became constant and by the day of admission was rated as severe and was associated with vomiting.  She has experienced mild relief from pain medications given in the ER.     No chest pain, shortness a breath or lightheadedness.  She was unable to dialysis on 07/28 as she presented here for her pain  ??      Overview/Hospital Course:  32F h/o ESRD on HD, DMII, dCHF, HTN p/w one day of abdominal pain found to have elevated LFTs after being discharged the day before for a hospitalization for PNA, DVT of the LUE and severe HTN.    There is concern that the patient may have acute cholecystitis.  Patient is currently undergoing HIDA scan.  Should this be consistent with acute cholecystitis the patient is NPO at midnight with intention to go to surgery on 07/30/2022.      Interval History: HIDA with cholecytitis. Lap freedom planned for today. Still with abdominal pain    Review of Systems   Constitutional:  Negative for chills and fever.   HENT:  Negative for congestion and rhinorrhea.    Respiratory:  Negative for cough, shortness of breath and wheezing.    Cardiovascular:  Negative for chest pain, palpitations and leg swelling.   Gastrointestinal:  Positive for abdominal pain and nausea. Negative for abdominal distention and vomiting.   Endocrine: Negative for  cold intolerance and heat intolerance.   Musculoskeletal:  Negative for arthralgias and neck stiffness.   Skin:  Negative for rash and wound.   Neurological:  Negative for dizziness and weakness.   Objective:     Vital Signs (Most Recent):  Temp: 98.1 °F (36.7 °C) (07/30/22 1300)  Pulse: 74 (07/30/22 1400)  Resp: 16 (07/30/22 1300)  BP: 130/80 (07/30/22 1400)  SpO2: 98 % (07/30/22 1300) Vital Signs (24h Range):  Temp:  [96.8 °F (36 °C)-99.4 °F (37.4 °C)] 98.1 °F (36.7 °C)  Pulse:  [69-82] 74  Resp:  [10-18] 16  SpO2:  [94 %-99 %] 98 %  BP: (120-183)/() 130/80     Weight: 74.8 kg (165 lb)  Body mass index is 30.18 kg/m².    Intake/Output Summary (Last 24 hours) at 7/30/2022 1406  Last data filed at 7/30/2022 1230  Gross per 24 hour   Intake 1894.45 ml   Output 3950 ml   Net -2055.55 ml      Physical Exam  Constitutional:       Appearance: She is well-developed.   HENT:      Head: Normocephalic and atraumatic.   Eyes:      Conjunctiva/sclera: Conjunctivae normal.      Pupils: Pupils are equal, round, and reactive to light.   Neck:      Thyroid: No thyromegaly.      Vascular: No JVD.   Cardiovascular:      Rate and Rhythm: Normal rate and regular rhythm.      Heart sounds: No murmur heard.    No friction rub. No gallop.   Pulmonary:      Effort: Pulmonary effort is normal.      Breath sounds: No wheezing.   Abdominal:      General: Bowel sounds are normal. There is no distension.      Palpations: Abdomen is soft. There is no mass.      Tenderness: There is abdominal tenderness.   Musculoskeletal:         General: Normal range of motion.      Cervical back: Neck supple.   Skin:     General: Skin is warm and dry.   Neurological:      Mental Status: She is oriented to person, place, and time.      Cranial Nerves: No cranial nerve deficit.   Psychiatric:         Behavior: Behavior normal.       Significant Labs: All pertinent labs within the past 24 hours have been reviewed.    Significant Imaging: I have reviewed  all pertinent imaging results/findings within the past 24 hours.        Assessment/Plan:      * Acute cholecystitis  Plan for surgery today  GI and Surgery following  LFTs likely reactive    HTN (hypertension)    -resume home regimen except currently holding hydralazine    Deep vein thrombosis (DVT) of non-extremity vein  Heparin gtt    ESRD (end stage renal disease)  Renal following for HD needs      Type II diabetes mellitus  Patient's FSGs are controlled on current medication regimen.  Last A1c reviewed-   Lab Results   Component Value Date    HGBA1C 6.0 (H) 07/22/2022     Most recent fingerstick glucose reviewed-   Recent Labs   Lab 07/29/22  2101 07/30/22  0838   POCTGLUCOSE 182* 186*     Current correctional scale  Medium  Maintain anti-hyperglycemic dose as follows-   Antihyperglycemics (From admission, onward)            Start     Stop Route Frequency Ordered    07/29/22 0900  insulin detemir U-100 pen 5 Units         -- SubQ Daily 07/28/22 1803    07/28/22 1857  insulin aspart U-100 pen 0-5 Units         -- SubQ Before meals & nightly PRN 07/28/22 1803        Hold Oral hypoglycemics while patient is in the hospital.      VTE Risk Mitigation (From admission, onward)         Ordered     IP VTE HIGH RISK PATIENT  Once         07/28/22 1803     Place sequential compression device  Until discontinued         07/28/22 1803                Discharge Planning   TATIANA:      Code Status: Full Code   Is the patient medically ready for discharge?:     Reason for patient still in hospital (select all that apply): Patient trending condition and Treatment  Discharge Plan A: Home                  Linda Cueto MD  Department of Hospital Medicine   [facility]

## 2022-07-30 NOTE — CARE UPDATE
07/30/22 1249   Patient Assessment/Suction   Level of Consciousness (AVPU) alert   Respiratory Effort Normal;Unlabored   All Lung Fields Breath Sounds Anterior:;Lateral:;clear;equal bilaterally   PRE-TX-O2   O2 Device (Oxygen Therapy) nasal cannula   $ Is the patient on Low Flow Oxygen? Yes   Flow (L/min) 2   SpO2 98 %   Pulse Oximetry Type Intermittent   $ Pulse Oximetry - Multiple Charge Pulse Oximetry - Multiple   Pulse 71   Resp 14   Incentive Spirometer   $ Incentive Spirometer Charges done with encouragement;proper technique demonstrated   Administration (IS) instruction provided, follow-up;proper technique demonstrated;mouthpiece utilized   Number of Repetitions (IS) 5   Level Incentive Spirometer (mL) 1500   Patient Tolerance (IS) good

## 2022-07-30 NOTE — TRANSFER OF CARE
"Anesthesia Transfer of Care Note    Patient: Tabby Howard    Procedure(s) Performed: Procedure(s) (LRB):  CHOLECYSTECTOMY, LAPAROSCOPIC (N/A)    Patient location: PACU    Anesthesia Type: general    Transport from OR: Transported from OR on 2-3 L/min O2 by NC with adequate spontaneous ventilation    Post pain: adequate analgesia    Post assessment: no apparent anesthetic complications    Post vital signs: stable    Level of consciousness: responds to stimulation and sedated    Nausea/Vomiting: no nausea/vomiting    Complications: none    Transfer of care protocol was followed      Last vitals:   Visit Vitals  BP (!) 176/85   Pulse 79   Temp 36 °C (96.8 °F)   Resp 16   Ht 5' 2" (1.575 m)   Wt 74.8 kg (165 lb)   LMP 12/14/2021 (Approximate)   SpO2 (!) 94%   Breastfeeding No   BMI 30.18 kg/m²     "

## 2022-07-30 NOTE — ASSESSMENT & PLAN NOTE
Patient's FSGs are controlled on current medication regimen.  Last A1c reviewed-   Lab Results   Component Value Date    HGBA1C 6.0 (H) 07/22/2022     Most recent fingerstick glucose reviewed-   Recent Labs   Lab 07/29/22  2101 07/30/22  0838   POCTGLUCOSE 182* 186*     Current correctional scale  Medium  Maintain anti-hyperglycemic dose as follows-   Antihyperglycemics (From admission, onward)            Start     Stop Route Frequency Ordered    07/29/22 0900  insulin detemir U-100 pen 5 Units         -- SubQ Daily 07/28/22 1803    07/28/22 1857  insulin aspart U-100 pen 0-5 Units         -- SubQ Before meals & nightly PRN 07/28/22 1803        Hold Oral hypoglycemics while patient is in the hospital.

## 2022-07-31 LAB
ALBUMIN SERPL BCP-MCNC: 1.7 G/DL (ref 3.5–5.2)
ALP SERPL-CCNC: 185 U/L (ref 55–135)
ALT SERPL W/O P-5'-P-CCNC: 72 U/L (ref 10–44)
ANION GAP SERPL CALC-SCNC: 9 MMOL/L (ref 8–16)
AST SERPL-CCNC: 34 U/L (ref 10–40)
BASOPHILS # BLD AUTO: 0.02 K/UL (ref 0–0.2)
BASOPHILS NFR BLD: 0.3 % (ref 0–1.9)
BILIRUB SERPL-MCNC: 0.5 MG/DL (ref 0.1–1)
BUN SERPL-MCNC: 22 MG/DL (ref 6–20)
CALCIUM SERPL-MCNC: 7.1 MG/DL (ref 8.7–10.5)
CHLORIDE SERPL-SCNC: 105 MMOL/L (ref 95–110)
CO2 SERPL-SCNC: 23 MMOL/L (ref 23–29)
CREAT SERPL-MCNC: 3.3 MG/DL (ref 0.5–1.4)
DIFFERENTIAL METHOD: ABNORMAL
EOSINOPHIL # BLD AUTO: 0.1 K/UL (ref 0–0.5)
EOSINOPHIL NFR BLD: 2 % (ref 0–8)
ERYTHROCYTE [DISTWIDTH] IN BLOOD BY AUTOMATED COUNT: 14.4 % (ref 11.5–14.5)
EST. GFR  (AFRICAN AMERICAN): 20 ML/MIN/1.73 M^2
EST. GFR  (NON AFRICAN AMERICAN): 18 ML/MIN/1.73 M^2
GLUCOSE SERPL-MCNC: 117 MG/DL (ref 70–110)
HCT VFR BLD AUTO: 21.3 % (ref 37–48.5)
HGB BLD-MCNC: 7.5 G/DL (ref 12–16)
IMM GRANULOCYTES # BLD AUTO: 0.05 K/UL (ref 0–0.04)
IMM GRANULOCYTES NFR BLD AUTO: 0.7 % (ref 0–0.5)
LYMPHOCYTES # BLD AUTO: 0.6 K/UL (ref 1–4.8)
LYMPHOCYTES NFR BLD: 8.7 % (ref 18–48)
MCH RBC QN AUTO: 30.1 PG (ref 27–31)
MCHC RBC AUTO-ENTMCNC: 35.2 G/DL (ref 32–36)
MCV RBC AUTO: 86 FL (ref 82–98)
MONOCYTES # BLD AUTO: 0.5 K/UL (ref 0.3–1)
MONOCYTES NFR BLD: 6.4 % (ref 4–15)
NEUTROPHILS # BLD AUTO: 5.9 K/UL (ref 1.8–7.7)
NEUTROPHILS NFR BLD: 81.9 % (ref 38–73)
NRBC BLD-RTO: 0 /100 WBC
PLATELET # BLD AUTO: 93 K/UL (ref 150–450)
PMV BLD AUTO: 10.4 FL (ref 9.2–12.9)
POCT GLUCOSE: 109 MG/DL (ref 70–110)
POCT GLUCOSE: 127 MG/DL (ref 70–110)
POCT GLUCOSE: 98 MG/DL (ref 70–110)
POTASSIUM SERPL-SCNC: 3.9 MMOL/L (ref 3.5–5.1)
PROT SERPL-MCNC: 4.3 G/DL (ref 6–8.4)
RBC # BLD AUTO: 2.49 M/UL (ref 4–5.4)
SODIUM SERPL-SCNC: 137 MMOL/L (ref 136–145)
WBC # BLD AUTO: 7.14 K/UL (ref 3.9–12.7)

## 2022-07-31 PROCEDURE — 80053 COMPREHEN METABOLIC PANEL: CPT | Performed by: HOSPITALIST

## 2022-07-31 PROCEDURE — 25000003 PHARM REV CODE 250: Performed by: INTERNAL MEDICINE

## 2022-07-31 PROCEDURE — 63600175 PHARM REV CODE 636 W HCPCS: Performed by: INTERNAL MEDICINE

## 2022-07-31 PROCEDURE — 25000003 PHARM REV CODE 250: Performed by: HOSPITALIST

## 2022-07-31 PROCEDURE — 85025 COMPLETE CBC W/AUTO DIFF WBC: CPT | Performed by: HOSPITALIST

## 2022-07-31 PROCEDURE — 36415 COLL VENOUS BLD VENIPUNCTURE: CPT | Performed by: HOSPITALIST

## 2022-07-31 PROCEDURE — 94761 N-INVAS EAR/PLS OXIMETRY MLT: CPT

## 2022-07-31 PROCEDURE — 94799 UNLISTED PULMONARY SVC/PX: CPT

## 2022-07-31 PROCEDURE — 11000001 HC ACUTE MED/SURG PRIVATE ROOM

## 2022-07-31 RX ORDER — OXYCODONE AND ACETAMINOPHEN 5; 325 MG/1; MG/1
1 TABLET ORAL EVERY 4 HOURS PRN
Status: DISCONTINUED | OUTPATIENT
Start: 2022-07-31 | End: 2022-08-02 | Stop reason: HOSPADM

## 2022-07-31 RX ADMIN — FLUOXETINE 20 MG: 20 CAPSULE ORAL at 08:07

## 2022-07-31 RX ADMIN — CARVEDILOL 25 MG: 25 TABLET, FILM COATED ORAL at 08:07

## 2022-07-31 RX ADMIN — APIXABAN 5 MG: 2.5 TABLET, FILM COATED ORAL at 01:07

## 2022-07-31 RX ADMIN — CARVEDILOL 25 MG: 25 TABLET, FILM COATED ORAL at 09:07

## 2022-07-31 RX ADMIN — HYDROMORPHONE HYDROCHLORIDE 1 MG: 2 INJECTION, SOLUTION INTRAMUSCULAR; INTRAVENOUS; SUBCUTANEOUS at 02:07

## 2022-07-31 RX ADMIN — FUROSEMIDE 40 MG: 40 TABLET ORAL at 06:07

## 2022-07-31 RX ADMIN — HYDROMORPHONE HYDROCHLORIDE 1 MG: 2 INJECTION, SOLUTION INTRAMUSCULAR; INTRAVENOUS; SUBCUTANEOUS at 06:07

## 2022-07-31 RX ADMIN — AMLODIPINE BESYLATE 10 MG: 5 TABLET ORAL at 08:07

## 2022-07-31 RX ADMIN — APIXABAN 5 MG: 2.5 TABLET, FILM COATED ORAL at 09:07

## 2022-07-31 RX ADMIN — HYDROMORPHONE HYDROCHLORIDE 1 MG: 2 INJECTION, SOLUTION INTRAMUSCULAR; INTRAVENOUS; SUBCUTANEOUS at 09:07

## 2022-07-31 RX ADMIN — HYDROMORPHONE HYDROCHLORIDE 1 MG: 2 INJECTION, SOLUTION INTRAMUSCULAR; INTRAVENOUS; SUBCUTANEOUS at 04:07

## 2022-07-31 RX ADMIN — FAMOTIDINE 20 MG: 20 TABLET ORAL at 08:07

## 2022-07-31 RX ADMIN — ISOSORBIDE MONONITRATE 120 MG: 60 TABLET, EXTENDED RELEASE ORAL at 08:07

## 2022-07-31 RX ADMIN — LEVETIRACETAM 500 MG: 500 TABLET, FILM COATED ORAL at 08:07

## 2022-07-31 RX ADMIN — HYDROMORPHONE HYDROCHLORIDE 1 MG: 2 INJECTION, SOLUTION INTRAMUSCULAR; INTRAVENOUS; SUBCUTANEOUS at 11:07

## 2022-07-31 RX ADMIN — INSULIN DETEMIR 5 UNITS: 100 INJECTION, SOLUTION SUBCUTANEOUS at 08:07

## 2022-07-31 RX ADMIN — FUROSEMIDE 40 MG: 40 TABLET ORAL at 08:07

## 2022-07-31 RX ADMIN — MUPIROCIN: 20 OINTMENT TOPICAL at 09:07

## 2022-07-31 RX ADMIN — LEVETIRACETAM 500 MG: 500 TABLET, FILM COATED ORAL at 09:07

## 2022-07-31 NOTE — PROGRESS NOTES
[facility] Hospital Medicine  Progress Note    Patient Name: Tabby Howard  MRN: [id number]  Patient Class: IP- Inpatient   Admission Date: 7/28/2022  Length of Stay: 2 days  Attending Physician: Linda Cueto MD  Primary Care Provider: [facility]        Subjective:     Principal Problem:Acute cholecystitis        HPI:  32F h/o ESRD on HD, DMII, dCHF, HTN p/w one day of abdominal pain found to have elevated LFTs after being discharged the day before for a hospitalization for PNA, DVT of the LUE and severe HTN.     After discharge yesterday, the patient developed right upper quadrant pain which thereafter became constant and by the day of admission was rated as severe and was associated with vomiting.  She has experienced mild relief from pain medications given in the ER.     No chest pain, shortness a breath or lightheadedness.  She was unable to dialysis on 07/28 as she presented here for her pain  ??      Overview/Hospital Course:  32F h/o ESRD on HD, DMII, dCHF, HTN p/w one day of abdominal pain found to have elevated LFTs after being discharged the day before for a hospitalization for PNA, DVT of the LUE and severe HTN.    There is concern that the patient may have acute cholecystitis.  Patient is currently undergoing HIDA scan.  Should this be consistent with acute cholecystitis the patient is NPO at midnight with intention to go to surgery on 07/30/2022.      Interval History: Lap freedom yesterday, reports decent pain control on current regimen. Hard stick, AM labs just obtained. Patient wants to speak to , will have  visit tomorrow.     Review of Systems   Constitutional:  Negative for chills and fever.   HENT:  Negative for congestion and rhinorrhea.    Respiratory:  Negative for cough, shortness of breath and wheezing.    Cardiovascular:  Negative for chest pain, palpitations and leg swelling.   Gastrointestinal:  Positive for  abdominal pain and nausea. Negative for abdominal distention and vomiting.   Endocrine: Negative for cold intolerance and heat intolerance.   Musculoskeletal:  Negative for arthralgias and neck stiffness.   Skin:  Negative for rash and wound.   Neurological:  Negative for dizziness and weakness.   Objective:     Vital Signs (Most Recent):  Temp: 98.3 °F (36.8 °C) (07/31/22 0844)  Pulse: 103 (07/31/22 0844)  Resp: 18 (07/31/22 0915)  BP: (!) 177/94 (07/31/22 0844)  SpO2: 96 % (07/31/22 0844) Vital Signs (24h Range):  Temp:  [96.1 °F (35.6 °C)-98.7 °F (37.1 °C)] 98.3 °F (36.8 °C)  Pulse:  [] 103  Resp:  [10-18] 18  SpO2:  [94 %-99 %] 96 %  BP: (120-181)/(66-94) 177/94     Weight: 74.8 kg (165 lb)  Body mass index is 30.18 kg/m².    Intake/Output Summary (Last 24 hours) at 7/31/2022 1021  Last data filed at 7/31/2022 0800  Gross per 24 hour   Intake 1580 ml   Output 3750 ml   Net -2170 ml        Physical Exam  Constitutional:       Appearance: She is well-developed.   HENT:      Head: Normocephalic and atraumatic.   Eyes:      Conjunctiva/sclera: Conjunctivae normal.      Pupils: Pupils are equal, round, and reactive to light.   Neck:      Thyroid: No thyromegaly.      Vascular: No JVD.   Cardiovascular:      Rate and Rhythm: Normal rate and regular rhythm.      Heart sounds: No murmur heard.    No friction rub. No gallop.   Pulmonary:      Effort: Pulmonary effort is normal.      Breath sounds: No wheezing.   Abdominal:      General: Bowel sounds are normal. There is no distension.      Palpations: Abdomen is soft. There is no mass.      Tenderness: There is abdominal tenderness.      Comments: Dressings c/d/i   Musculoskeletal:         General: Normal range of motion.      Cervical back: Neck supple.   Skin:     General: Skin is warm and dry.   Neurological:      Mental Status: She is oriented to person, place, and time.      Cranial Nerves: No cranial nerve deficit.   Psychiatric:         Behavior: Behavior  normal.       Significant Labs: All pertinent labs within the past 24 hours have been reviewed.    Significant Imaging: I have reviewed all pertinent imaging results/findings within the past 24 hours.        Assessment/Plan:      * Acute cholecystitis  S/p surgery 7/30  GI and Surgery following  LFTs likely reactive    HTN (hypertension)    -resume home regimen except currently holding hydralazine    Deep vein thrombosis (DVT) of non-extremity vein  Restart eliquis    ESRD (end stage renal disease)  Renal following for HD needs      Type II diabetes mellitus  Patient's FSGs are controlled on current medication regimen.  Last A1c reviewed-   Lab Results   Component Value Date    HGBA1C 6.0 (H) 07/22/2022     Most recent fingerstick glucose reviewed-   Recent Labs   Lab 07/29/22  2101 07/30/22  0838   POCTGLUCOSE 182* 186*     Current correctional scale  Medium  Maintain anti-hyperglycemic dose as follows-   Antihyperglycemics (From admission, onward)            Start     Stop Route Frequency Ordered    07/29/22 0900  insulin detemir U-100 pen 5 Units         -- SubQ Daily 07/28/22 1803    07/28/22 1857  insulin aspart U-100 pen 0-5 Units         -- SubQ Before meals & nightly PRN 07/28/22 1803        Hold Oral hypoglycemics while patient is in the hospital.    VTE Risk Mitigation (From admission, onward)         Ordered     apixaban tablet 5 mg  2 times daily         07/31/22 1027     IP VTE HIGH RISK PATIENT  Once         07/28/22 1803     Place sequential compression device  Until discontinued         07/28/22 1803                Discharge Planning   TATIANA:      Code Status: Full Code   Is the patient medically ready for discharge?:     Reason for patient still in hospital (select all that apply): Patient trending condition and Treatment  Discharge Plan A: Home                  Linda Cueto MD  Department of Hospital Medicine   [facility]

## 2022-07-31 NOTE — SUBJECTIVE & OBJECTIVE
Interval History: Ayan loja yesterday, reports decent pain control on current regimen. Hard stick, AM labs just obtained. Patient wants to speak to , will have  visit tomorrow.     Review of Systems   Constitutional:  Negative for chills and fever.   HENT:  Negative for congestion and rhinorrhea.    Respiratory:  Negative for cough, shortness of breath and wheezing.    Cardiovascular:  Negative for chest pain, palpitations and leg swelling.   Gastrointestinal:  Positive for abdominal pain and nausea. Negative for abdominal distention and vomiting.   Endocrine: Negative for cold intolerance and heat intolerance.   Musculoskeletal:  Negative for arthralgias and neck stiffness.   Skin:  Negative for rash and wound.   Neurological:  Negative for dizziness and weakness.   Objective:     Vital Signs (Most Recent):  Temp: 98.3 °F (36.8 °C) (07/31/22 0844)  Pulse: 103 (07/31/22 0844)  Resp: 18 (07/31/22 0915)  BP: (!) 177/94 (07/31/22 0844)  SpO2: 96 % (07/31/22 0844) Vital Signs (24h Range):  Temp:  [96.1 °F (35.6 °C)-98.7 °F (37.1 °C)] 98.3 °F (36.8 °C)  Pulse:  [] 103  Resp:  [10-18] 18  SpO2:  [94 %-99 %] 96 %  BP: (120-181)/(66-94) 177/94     Weight: 74.8 kg (165 lb)  Body mass index is 30.18 kg/m².    Intake/Output Summary (Last 24 hours) at 7/31/2022 1021  Last data filed at 7/31/2022 0800  Gross per 24 hour   Intake 1580 ml   Output 3750 ml   Net -2170 ml        Physical Exam  Constitutional:       Appearance: She is well-developed.   HENT:      Head: Normocephalic and atraumatic.   Eyes:      Conjunctiva/sclera: Conjunctivae normal.      Pupils: Pupils are equal, round, and reactive to light.   Neck:      Thyroid: No thyromegaly.      Vascular: No JVD.   Cardiovascular:      Rate and Rhythm: Normal rate and regular rhythm.      Heart sounds: No murmur heard.    No friction rub. No gallop.   Pulmonary:      Effort: Pulmonary effort is normal.      Breath sounds: No wheezing.   Abdominal:       General: Bowel sounds are normal. There is no distension.      Palpations: Abdomen is soft. There is no mass.      Tenderness: There is abdominal tenderness.      Comments: Dressings c/d/i   Musculoskeletal:         General: Normal range of motion.      Cervical back: Neck supple.   Skin:     General: Skin is warm and dry.   Neurological:      Mental Status: She is oriented to person, place, and time.      Cranial Nerves: No cranial nerve deficit.   Psychiatric:         Behavior: Behavior normal.       Significant Labs: All pertinent labs within the past 24 hours have been reviewed.    Significant Imaging: I have reviewed all pertinent imaging results/findings within the past 24 hours.

## 2022-07-31 NOTE — PROGRESS NOTES
POD 1 s/p lap freedom  Doing well    Wt Readings from Last 3 Encounters:   07/30/22 74.8 kg (165 lb)   07/26/22 75.2 kg (165 lb 12.6 oz)   07/22/22 74.8 kg (165 lb)     Temp Readings from Last 3 Encounters:   07/31/22 98.3 °F (36.8 °C)   07/27/22 98.5 °F (36.9 °C) (Oral)   07/22/22 98.7 °F (37.1 °C) (Temporal)     BP Readings from Last 3 Encounters:   07/31/22 (!) 177/94   07/27/22 (!) 146/72   07/22/22 (!) 153/95     Pulse Readings from Last 3 Encounters:   07/31/22 103   07/27/22 81   07/22/22 91     AAox3  CTA B  Soft/nt/nd    Lab Results   Component Value Date    WBC 7.14 07/31/2022    HGB 7.5 (L) 07/31/2022    HCT 21.3 (L) 07/31/2022    MCV 86 07/31/2022    PLT 93 (L) 07/31/2022       CMP  Sodium   Date Value Ref Range Status   07/31/2022 137 136 - 145 mmol/L Final     Potassium   Date Value Ref Range Status   07/31/2022 3.9 3.5 - 5.1 mmol/L Final     Chloride   Date Value Ref Range Status   07/31/2022 105 95 - 110 mmol/L Final     CO2   Date Value Ref Range Status   07/31/2022 23 23 - 29 mmol/L Final     Glucose   Date Value Ref Range Status   07/31/2022 117 (H) 70 - 110 mg/dL Final     BUN   Date Value Ref Range Status   07/31/2022 22 (H) 6 - 20 mg/dL Final     Creatinine   Date Value Ref Range Status   07/31/2022 3.3 (H) 0.5 - 1.4 mg/dL Final     Calcium   Date Value Ref Range Status   07/31/2022 7.1 (L) 8.7 - 10.5 mg/dL Final     Total Protein   Date Value Ref Range Status   07/31/2022 4.3 (L) 6.0 - 8.4 g/dL Final     Albumin   Date Value Ref Range Status   07/31/2022 1.7 (L) 3.5 - 5.2 g/dL Final     Total Bilirubin   Date Value Ref Range Status   07/31/2022 0.5 0.1 - 1.0 mg/dL Final     Comment:     For infants and newborns, interpretation of results should be based  on gestational age, weight and in agreement with clinical  observations.    Premature Infant recommended reference ranges:  Up to 24 hours.............<8.0 mg/dL  Up to 48 hours............<12.0 mg/dL  3-5 days..................<15.0 mg/dL  6-29  days.................<15.0 mg/dL       Alkaline Phosphatase   Date Value Ref Range Status   07/31/2022 185 (H) 55 - 135 U/L Final     AST   Date Value Ref Range Status   07/31/2022 34 10 - 40 U/L Final     ALT   Date Value Ref Range Status   07/31/2022 72 (H) 10 - 44 U/L Final     Anion Gap   Date Value Ref Range Status   07/31/2022 9 8 - 16 mmol/L Final     eGFR if    Date Value Ref Range Status   07/31/2022 20 (A) >60 mL/min/1.73 m^2 Final     eGFR if non    Date Value Ref Range Status   07/31/2022 18 (A) >60 mL/min/1.73 m^2 Final     Comment:     Calculation used to obtain the estimated glomerular filtration  rate (eGFR) is the CKD-EPI equation.        A/P: s/p lap freedom  Adv diet  Monitor h/h.  If cont drop may need transfusion  Ok to d/c when cleared by hospitalist

## 2022-07-31 NOTE — PROGRESS NOTES
INPATIENT NEPHROLOGY Progress Note  [facility] NEPHROLOGY    Tabby Howard  07/31/2022    Reason for consultation:    esrd    Chief Complaint:   Chief Complaint   Patient presents with    Abdominal Pain     Abdominal pain and chest pain since this am was just discharged on Wednesday           History of Present Illness:    32F h/o ESRD on HD, DMII, dCHF, HTN p/w one day of abdominal pain found to have elevated LFTs after being discharged the day before for a hospitalization for PNA, DVT of the LUE and severe HTN.      7/29 She denies neurologic symptoms, fever, urinary or bowel complaints or cough.  She has mild shortness of breath.  She has constant abdominal pain with no exacerbating or alleviating factors.  7/30 went for lap freedom; seen on HD- tolerating  7/31 no issues with HD- got off 3L, poor appetite, getting pain meds for abd pain    Plan of Care:     ESRD on HD TTS  - continue HD TTS    HTN/Hypervolemia  - continue 2-4L UF  - continue diuretic    Acute freedom s/p lap freedom on 7/30  - recovering    SHPT  - at goal- continue renal diet    Anemia of CKD  - Hb drop post op- recheck CBC in AM to see if she needs a blood transfusion  - continue SHELLEY with HD TTS    Thank you for allowing us to participate in this patient's care. We will continue to follow.    Vital Signs:  Temp Readings from Last 3 Encounters:   07/31/22 98.3 °F (36.8 °C)   07/27/22 98.5 °F (36.9 °C) (Oral)   07/22/22 98.7 °F (37.1 °C) (Temporal)       Pulse Readings from Last 3 Encounters:   07/31/22 103   07/27/22 81   07/22/22 91       BP Readings from Last 3 Encounters:   07/31/22 (!) 177/94   07/27/22 (!) 146/72   07/22/22 (!) 153/95       Weight:  Wt Readings from Last 3 Encounters:   07/30/22 74.8 kg (165 lb)   07/26/22 75.2 kg (165 lb 12.6 oz)   07/22/22 74.8 kg (165 lb)       Medications:  Current Facility-Administered Medications on File Prior to Encounter   Medication Dose Route Frequency Provider Last Rate Last Admin    fentaNYL 50  mcg/mL injection   Intravenous PRN Jann J Tonia Jr., CRNA   100 mcg at 07/22/22 1502    midazolam injection   Intravenous PRN Jann J Tonia Jr., CRNA   2 mg at 07/22/22 1502    propofol (DIPRIVAN) 10 mg/mL infusion   Intravenous PRN Jann J Tonia Jr., CRNA   100 mg at 07/22/22 1500    rocuronium injection   Intravenous PRN Jann J Tonia Jr., CRNA   20 mg at 07/22/22 1502    succinylcholine injection   Intravenous PRN Jann J Tonia Jr., CRNA   120 mg at 07/22/22 1500     Current Outpatient Medications on File Prior to Encounter   Medication Sig Dispense Refill    amLODIPine (NORVASC) 10 MG tablet Take 1 tablet (10 mg total) by mouth once daily. 30 tablet 11    apixaban (ELIQUIS) 5 mg Tab TAKE 2 TABLETS BY MOUTH TWICE DAILY FOR 7 DAYS THEN 1 TABLET TWICE DAILY THERE AFTER 74 tablet 0    [START ON 8/24/2022] apixaban (ELIQUIS) 5 mg Tab Take 1 tablet (5 mg total) by mouth 2 (two) times daily. For second month on. 60 tablet 2    carvediloL (COREG) 25 MG tablet Take 1 tablet (25 mg total) by mouth 2 (two) times daily. 60 tablet 2    cloNIDine 0.2 mg/24 hr td ptwk (CATAPRES) 0.2 mg/24 hr Place 1 patch onto the skin every 7 days. 4 patch 2    famotidine (PEPCID) 20 MG tablet Take 1 tablet (20 mg total) by mouth once daily. 30 tablet 0    FLUoxetine 20 MG capsule Take 1 capsule (20 mg total) by mouth once daily. 30 capsule 1    furosemide (LASIX) 40 MG tablet Take 1 tablet (40 mg total) by mouth 2 (two) times daily. 60 tablet 0    hydrALAZINE (APRESOLINE) 100 MG tablet Take 1 tablet (100 mg total) by mouth every 8 (eight) hours. 90 tablet 2    HYDROcodone-acetaminophen (NORCO) 5-325 mg per tablet Take 1 tablet by mouth every 6 (six) hours as needed for Pain. 20 tablet 0    insulin aspart U-100 (NOVOLOG) 100 unit/mL (3 mL) InPn pen Inject 1-10 Units into the skin before meals and at bedtime as needed (Hyperglycemia). 3 mL 3    isosorbide mononitrate (IMDUR) 60 MG 24 hr tablet Take 2  tablets (120 mg total) by mouth once daily. 30 tablet 1    LANTUS U-100 INSULIN 100 unit/mL injection SMARTSI Unit(s) SUB-Q Every Morning      levETIRAcetam (KEPPRA) 500 MG Tab Take 1 tablet (500 mg total) by mouth 2 (two) times daily. 60 tablet 1    [] levoFLOXacin (LEVAQUIN) 500 MG tablet Take 1 tablet (500 mg total) by mouth every other day. for 1 day 1 tablet 0    polyethylene glycol (GLYCOLAX) 17 gram/dose powder Take 17 g by mouth 2 (two) times daily. 510 each 0    predniSONE (DELTASONE) 20 MG tablet Take 3 tablets (60 mg total) by mouth once daily for 4 days, THEN 2 tablets (40 mg total) once daily for 4 days, THEN 1 tablet (20 mg total) once daily for 4 days, THEN 0.5 tablets (10 mg total) once daily for 4 days. 26 tablet 0    [DISCONTINUED] atenoloL (TENORMIN) 50 MG tablet Take 1 tablet (50 mg total) by mouth every other day. 45 tablet 3    [DISCONTINUED] pantoprazole (PROTONIX) 40 MG tablet Take 1 tablet (40 mg total) by mouth once daily. 30 tablet 3    [DISCONTINUED] torsemide (DEMADEX) 20 MG Tab Take 1 tablet (20 mg total) by mouth 2 (two) times a day. (Patient taking differently: Take 20 mg by mouth once daily.) 60 tablet 1     Scheduled Meds:   amLODIPine  10 mg Oral Daily    apixaban  5 mg Oral BID    carvediloL  25 mg Oral BID    cloNIDine 0.2 mg/24 hr td ptwk  1 patch Transdermal Q7 Days    [START ON 2022] epoetin teresa (PROCRIT) injection  50 Units/kg Subcutaneous Every Tues, Thurs, Sat    famotidine  20 mg Oral Daily    FLUoxetine  20 mg Oral Daily    furosemide  40 mg Oral BID    insulin detemir U-100  5 Units Subcutaneous Daily    isosorbide mononitrate  120 mg Oral Daily    levETIRAcetam  500 mg Oral BID    mupirocin   Nasal BID     Continuous Infusions:    PRN Meds:.acetaminophen, dextrose 10%, dextrose 10%, glucagon (human recombinant), glucose, glucose, HYDROmorphone, insulin aspart U-100, naloxone, ondansetron, oxyCODONE-acetaminophen,  "prochlorperazine    Review of Systems:  Neg    Physical Exam:    BP (!) 177/94   Pulse 103   Temp 98.3 °F (36.8 °C)   Resp 18   Ht 5' 2" (1.575 m)   Wt 74.8 kg (165 lb)   LMP 12/14/2021 (Approximate)   SpO2 96%   Breastfeeding No   BMI 30.18 kg/m²     Constitutional: nad, aao x 3  Heart: rrr, no m/r/g, wwp, no edema  Lungs: ctab, no w/r/r/c, no lb  Abdomen: s/nt/nd, +BS    Results:  Lab Results   Component Value Date     07/31/2022    K 3.9 07/31/2022     07/31/2022    CO2 23 07/31/2022    BUN 22 (H) 07/31/2022    CREATININE 3.3 (H) 07/31/2022    CALCIUM 7.1 (L) 07/31/2022    ANIONGAP 9 07/31/2022    ESTGFRAFRICA 20 (A) 07/31/2022    EGFRNONAA 18 (A) 07/31/2022       Lab Results   Component Value Date    CALCIUM 7.1 (L) 07/31/2022    PHOS 5.4 (H) 07/30/2022       Recent Labs   Lab 07/31/22  1013   WBC 7.14   RBC 2.49*   HGB 7.5*   HCT 21.3*   PLT 93*   MCV 86   MCH 30.1   MCHC 35.2          I have personally reviewed pertinent radiological imaging and reports.     Patient care time was spent personally by me on the following activities: > 35 minutes  · Obtaining a history.  · Examination of patient.  · Providing medical care at the patients bedside.  · Developing a treatment plan with patient or surrogate and bedside caregivers.  · Ordering and reviewing laboratory studies, radiographic studies, pulse oximetry.  · Ordering and performing treatments and interventions.  · Evaluation of patient's response to treatment.  · Discussions with consultants while on the unit and immediately available to the patient.  · Re-evaluation of the patient's condition.  · Documentation in the medical record.    Prateek Clark MD MPH  [facility] [address]  [place] [address]  [phone] (p)  [phone] (f)    "

## 2022-07-31 NOTE — PLAN OF CARE
Uneventful night.  Medicated for c/o abdominal pain x2.  VS slightly elevated.  No urine output this shift.  Poor appetite this evening (did not want to eat dinner tray or any other snacks that I offered ) Pt tearful and overwhelmed with her health problems, she agrees to speak with , consult placed.  Encouraged her to listen to her [employer] music, which she has been doing.  Needs attended to.

## 2022-07-31 NOTE — PLAN OF CARE
POC/Meds reviewed, pt verbalized understanding. Vitals stable. Afebrile. Remains on room air. IVPB abx administered. Tele In place-NSR. Up with x1 assist to BSC. IS at bedside, instructed on use and return demonstration performed. PRN pain meds administered. BG closely monitored, no coverage required. Repositions self. Hourly/Q2hr rounding performed, safety maintained. Bed in lowest position, wheels locked, SR up x2, call light in easy reach. No  complaints at this time. Will continue to monitor.

## 2022-07-31 NOTE — CARE UPDATE
07/30/22 1957   Patient Assessment/Suction   Level of Consciousness (AVPU) alert   Respiratory Effort Normal;Unlabored   Expansion/Accessory Muscles/Retractions no retractions;no use of accessory muscles   All Lung Fields Breath Sounds diminished   Rhythm/Pattern, Respiratory depth regular;pattern regular   Cough Frequency no cough   PRE-TX-O2   O2 Device (Oxygen Therapy) room air   SpO2 95 %   Pulse Oximetry Type Intermittent   Incentive Spirometer   $ Incentive Spirometer Charges done independently per patient   Administration (IS) proper technique demonstrated   Number of Repetitions (IS) 5   Level Incentive Spirometer (mL) 1750   Patient Tolerance (IS) good;no adverse signs/symptoms present

## 2022-08-01 ENCOUNTER — TELEPHONE (OUTPATIENT)
Dept: SURGERY | Facility: CLINIC | Age: 32
End: 2022-08-01
Payer: MEDICAID

## 2022-08-01 LAB
ALBUMIN SERPL BCP-MCNC: 1.9 G/DL (ref 3.5–5.2)
ALP SERPL-CCNC: 191 U/L (ref 55–135)
ALT SERPL W/O P-5'-P-CCNC: 51 U/L (ref 10–44)
ANION GAP SERPL CALC-SCNC: 14 MMOL/L (ref 8–16)
AST SERPL-CCNC: 28 U/L (ref 10–40)
BASOPHILS # BLD AUTO: 0.02 K/UL (ref 0–0.2)
BASOPHILS NFR BLD: 0.4 % (ref 0–1.9)
BILIRUB SERPL-MCNC: 0.6 MG/DL (ref 0.1–1)
BUN SERPL-MCNC: 25 MG/DL (ref 6–20)
CALCIUM SERPL-MCNC: 7.4 MG/DL (ref 8.7–10.5)
CHLORIDE SERPL-SCNC: 104 MMOL/L (ref 95–110)
CO2 SERPL-SCNC: 20 MMOL/L (ref 23–29)
CREAT SERPL-MCNC: 3.7 MG/DL (ref 0.5–1.4)
DIFFERENTIAL METHOD: ABNORMAL
EOSINOPHIL # BLD AUTO: 0.2 K/UL (ref 0–0.5)
EOSINOPHIL NFR BLD: 3.3 % (ref 0–8)
ERYTHROCYTE [DISTWIDTH] IN BLOOD BY AUTOMATED COUNT: 14.1 % (ref 11.5–14.5)
EST. GFR  (NO RACE VARIABLE): 16 ML/MIN/1.73 M^2
GLUCOSE SERPL-MCNC: 87 MG/DL (ref 70–110)
HCT VFR BLD AUTO: 22.2 % (ref 37–48.5)
HGB BLD-MCNC: 7.7 G/DL (ref 12–16)
HIV1+2 IGG SERPL QL IA.RAPID: NORMAL
IMM GRANULOCYTES # BLD AUTO: 0.06 K/UL (ref 0–0.04)
IMM GRANULOCYTES NFR BLD AUTO: 1.1 % (ref 0–0.5)
LYMPHOCYTES # BLD AUTO: 0.9 K/UL (ref 1–4.8)
LYMPHOCYTES NFR BLD: 15.8 % (ref 18–48)
MCH RBC QN AUTO: 29.6 PG (ref 27–31)
MCHC RBC AUTO-ENTMCNC: 34.7 G/DL (ref 32–36)
MCV RBC AUTO: 85 FL (ref 82–98)
MONOCYTES # BLD AUTO: 0.4 K/UL (ref 0.3–1)
MONOCYTES NFR BLD: 6.5 % (ref 4–15)
NEUTROPHILS # BLD AUTO: 4 K/UL (ref 1.8–7.7)
NEUTROPHILS NFR BLD: 72.9 % (ref 38–73)
NRBC BLD-RTO: 0 /100 WBC
PLATELET # BLD AUTO: 101 K/UL (ref 150–450)
PMV BLD AUTO: 10.3 FL (ref 9.2–12.9)
POCT GLUCOSE: 127 MG/DL (ref 70–110)
POCT GLUCOSE: 63 MG/DL (ref 70–110)
POCT GLUCOSE: 68 MG/DL (ref 70–110)
POCT GLUCOSE: 71 MG/DL (ref 70–110)
POCT GLUCOSE: 96 MG/DL (ref 70–110)
POTASSIUM SERPL-SCNC: 4 MMOL/L (ref 3.5–5.1)
PROT SERPL-MCNC: 4.9 G/DL (ref 6–8.4)
RBC # BLD AUTO: 2.6 M/UL (ref 4–5.4)
SODIUM SERPL-SCNC: 138 MMOL/L (ref 136–145)
WBC # BLD AUTO: 5.52 K/UL (ref 3.9–12.7)

## 2022-08-01 PROCEDURE — 94761 N-INVAS EAR/PLS OXIMETRY MLT: CPT

## 2022-08-01 PROCEDURE — 25000003 PHARM REV CODE 250: Performed by: INTERNAL MEDICINE

## 2022-08-01 PROCEDURE — 80053 COMPREHEN METABOLIC PANEL: CPT | Performed by: HOSPITALIST

## 2022-08-01 PROCEDURE — 11000001 HC ACUTE MED/SURG PRIVATE ROOM

## 2022-08-01 PROCEDURE — 25000003 PHARM REV CODE 250: Performed by: HOSPITALIST

## 2022-08-01 PROCEDURE — 63600175 PHARM REV CODE 636 W HCPCS: Performed by: INTERNAL MEDICINE

## 2022-08-01 PROCEDURE — 36415 COLL VENOUS BLD VENIPUNCTURE: CPT | Performed by: HOSPITALIST

## 2022-08-01 PROCEDURE — 85025 COMPLETE CBC W/AUTO DIFF WBC: CPT | Performed by: HOSPITALIST

## 2022-08-01 PROCEDURE — 94799 UNLISTED PULMONARY SVC/PX: CPT

## 2022-08-01 RX ADMIN — HYDROMORPHONE HYDROCHLORIDE 1 MG: 2 INJECTION, SOLUTION INTRAMUSCULAR; INTRAVENOUS; SUBCUTANEOUS at 04:08

## 2022-08-01 RX ADMIN — APIXABAN 5 MG: 2.5 TABLET, FILM COATED ORAL at 08:08

## 2022-08-01 RX ADMIN — AMLODIPINE BESYLATE 10 MG: 5 TABLET ORAL at 09:08

## 2022-08-01 RX ADMIN — CARVEDILOL 25 MG: 25 TABLET, FILM COATED ORAL at 09:08

## 2022-08-01 RX ADMIN — INSULIN DETEMIR 5 UNITS: 100 INJECTION, SOLUTION SUBCUTANEOUS at 09:08

## 2022-08-01 RX ADMIN — FAMOTIDINE 20 MG: 20 TABLET ORAL at 09:08

## 2022-08-01 RX ADMIN — APIXABAN 5 MG: 2.5 TABLET, FILM COATED ORAL at 09:08

## 2022-08-01 RX ADMIN — HYDROMORPHONE HYDROCHLORIDE 1 MG: 2 INJECTION, SOLUTION INTRAMUSCULAR; INTRAVENOUS; SUBCUTANEOUS at 03:08

## 2022-08-01 RX ADMIN — LEVETIRACETAM 500 MG: 500 TABLET, FILM COATED ORAL at 09:08

## 2022-08-01 RX ADMIN — LEVETIRACETAM 500 MG: 500 TABLET, FILM COATED ORAL at 08:08

## 2022-08-01 RX ADMIN — HYDROMORPHONE HYDROCHLORIDE 1 MG: 2 INJECTION, SOLUTION INTRAMUSCULAR; INTRAVENOUS; SUBCUTANEOUS at 10:08

## 2022-08-01 RX ADMIN — HYDROMORPHONE HYDROCHLORIDE 1 MG: 2 INJECTION, SOLUTION INTRAMUSCULAR; INTRAVENOUS; SUBCUTANEOUS at 09:08

## 2022-08-01 RX ADMIN — ISOSORBIDE MONONITRATE 120 MG: 60 TABLET, EXTENDED RELEASE ORAL at 09:08

## 2022-08-01 RX ADMIN — HYDROMORPHONE HYDROCHLORIDE 1 MG: 2 INJECTION, SOLUTION INTRAMUSCULAR; INTRAVENOUS; SUBCUTANEOUS at 07:08

## 2022-08-01 RX ADMIN — MUPIROCIN: 20 OINTMENT TOPICAL at 09:08

## 2022-08-01 RX ADMIN — FLUOXETINE 20 MG: 20 CAPSULE ORAL at 09:08

## 2022-08-01 RX ADMIN — CARVEDILOL 25 MG: 25 TABLET, FILM COATED ORAL at 08:08

## 2022-08-01 RX ADMIN — FUROSEMIDE 40 MG: 40 TABLET ORAL at 09:08

## 2022-08-01 RX ADMIN — Medication 16 G: at 04:08

## 2022-08-01 RX ADMIN — HYDROMORPHONE HYDROCHLORIDE 1 MG: 2 INJECTION, SOLUTION INTRAMUSCULAR; INTRAVENOUS; SUBCUTANEOUS at 12:08

## 2022-08-01 NOTE — PLAN OF CARE
Problem: Adult Inpatient Plan of Care  Goal: Plan of Care Review  Outcome: Ongoing, Progressing     Problem: Adult Inpatient Plan of Care  Goal: Optimal Comfort and Wellbeing  Outcome: Ongoing, Progressing     Problem: Diabetes Comorbidity  Goal: Blood Glucose Level Within Targeted Range  Outcome: Ongoing, Progressing     Problem: Renal Function Impairment (Acute Kidney Injury/Impairment)  Goal: Effective Renal Function  Outcome: Ongoing, Progressing     Problem: Respiratory Compromise (Pneumonia)  Goal: Effective Oxygenation and Ventilation  Outcome: Ongoing, Progressing     Problem: Infection  Goal: Absence of Infection Signs and Symptoms  Outcome: Ongoing, Progressing     Problem: Fall Injury Risk  Goal: Absence of Fall and Fall-Related Injury  Outcome: Ongoing, Progressing

## 2022-08-01 NOTE — PLAN OF CARE
Cm in medical rounding. Pt continues to complain of pain. Pt not medically cleared. Discharge canceled. Cm will continue following.

## 2022-08-01 NOTE — PROGRESS NOTES
INPATIENT NEPHROLOGY Progress Note  [facility] NEPHROLOGY    Tabby Howard  08/01/2022    Reason for consultation:    esrd    Chief Complaint:   Chief Complaint   Patient presents with    Abdominal Pain     Abdominal pain and chest pain since this am was just discharged on Wednesday           History of Present Illness:    32F h/o ESRD on HD, DMII, dCHF, HTN p/w one day of abdominal pain found to have elevated LFTs after being discharged the day before for a hospitalization for PNA, DVT of the LUE and severe HTN.      7/29 She denies neurologic symptoms, fever, urinary or bowel complaints or cough.  She has mild shortness of breath.  She has constant abdominal pain with no exacerbating or alleviating factors.  7/30 went for lap freedom; seen on HD- tolerating  7/31 no issues with HD- got off 3L, poor appetite, getting pain meds for abd pain  8/1  Lab results reviewed, VSS, will order a unit of blood w/HD tomorrow.  C/o surgical pain.  VSS.    Plan of Care:     ESRD on HD TTS  - continue HD TTS    HTN/Hypervolemia  - continue 2-4L UF  - continue diuretic    Acute freedom s/p lap freedom on 7/30  - recovering    SHPT  - at goal- continue renal diet    Anemia of CKD  - Hb drop post op- transfuse on dialysis 8/2  - continue SHELLEY with HD TTS    Thank you for allowing us to participate in this patient's care. We will continue to follow.    Vital Signs:  Temp Readings from Last 3 Encounters:   08/01/22 96.3 °F (35.7 °C) (Oral)   07/27/22 98.5 °F (36.9 °C) (Oral)   07/22/22 98.7 °F (37.1 °C) (Temporal)       Pulse Readings from Last 3 Encounters:   08/01/22 82   07/27/22 81   07/22/22 91       BP Readings from Last 3 Encounters:   08/01/22 (!) 147/85   07/27/22 (!) 146/72   07/22/22 (!) 153/95       Weight:  Wt Readings from Last 3 Encounters:   07/30/22 74.8 kg (165 lb)   07/26/22 75.2 kg (165 lb 12.6 oz)   07/22/22 74.8 kg (165 lb)       Medications:  Current Facility-Administered Medications on File Prior to Encounter    Medication Dose Route Frequency Provider Last Rate Last Admin    [DISCONTINUED] fentaNYL 50 mcg/mL injection   Intravenous PRN Jann J Tonia Jr., CRNA   100 mcg at 07/22/22 1502    [DISCONTINUED] midazolam injection   Intravenous PRN Jann J Tonia Jr., CRNA   2 mg at 07/22/22 1502    [DISCONTINUED] propofol (DIPRIVAN) 10 mg/mL infusion   Intravenous PRN Jann J Tonia Jr., CRNA   100 mg at 07/22/22 1500    [DISCONTINUED] rocuronium injection   Intravenous PRN Jann J Tonia Jr., CRNA   20 mg at 07/22/22 1502    [DISCONTINUED] succinylcholine injection   Intravenous PRN Jann J Tonia Jr., CRNA   120 mg at 07/22/22 1500     Current Outpatient Medications on File Prior to Encounter   Medication Sig Dispense Refill    amLODIPine (NORVASC) 10 MG tablet Take 1 tablet (10 mg total) by mouth once daily. 30 tablet 11    apixaban (ELIQUIS) 5 mg Tab TAKE 2 TABLETS BY MOUTH TWICE DAILY FOR 7 DAYS THEN 1 TABLET TWICE DAILY THERE AFTER 74 tablet 0    [START ON 8/24/2022] apixaban (ELIQUIS) 5 mg Tab Take 1 tablet (5 mg total) by mouth 2 (two) times daily. For second month on. 60 tablet 2    carvediloL (COREG) 25 MG tablet Take 1 tablet (25 mg total) by mouth 2 (two) times daily. 60 tablet 2    cloNIDine 0.2 mg/24 hr td ptwk (CATAPRES) 0.2 mg/24 hr Place 1 patch onto the skin every 7 days. 4 patch 2    famotidine (PEPCID) 20 MG tablet Take 1 tablet (20 mg total) by mouth once daily. 30 tablet 0    FLUoxetine 20 MG capsule Take 1 capsule (20 mg total) by mouth once daily. 30 capsule 1    furosemide (LASIX) 40 MG tablet Take 1 tablet (40 mg total) by mouth 2 (two) times daily. 60 tablet 0    hydrALAZINE (APRESOLINE) 100 MG tablet Take 1 tablet (100 mg total) by mouth every 8 (eight) hours. 90 tablet 2    HYDROcodone-acetaminophen (NORCO) 5-325 mg per tablet Take 1 tablet by mouth every 6 (six) hours as needed for Pain. 20 tablet 0    insulin aspart U-100 (NOVOLOG) 100 unit/mL (3 mL) InPn pen Inject  1-10 Units into the skin before meals and at bedtime as needed (Hyperglycemia). 3 mL 3    isosorbide mononitrate (IMDUR) 60 MG 24 hr tablet Take 2 tablets (120 mg total) by mouth once daily. 30 tablet 1    LANTUS U-100 INSULIN 100 unit/mL injection SMARTSI Unit(s) SUB-Q Every Morning      levETIRAcetam (KEPPRA) 500 MG Tab Take 1 tablet (500 mg total) by mouth 2 (two) times daily. 60 tablet 1    polyethylene glycol (GLYCOLAX) 17 gram/dose powder Take 17 g by mouth 2 (two) times daily. 510 each 0    [] predniSONE (DELTASONE) 20 MG tablet Take 3 tablets (60 mg total) by mouth once daily for 4 days, THEN 2 tablets (40 mg total) once daily for 4 days, THEN 1 tablet (20 mg total) once daily for 4 days, THEN 0.5 tablets (10 mg total) once daily for 4 days. 26 tablet 0    [DISCONTINUED] atenoloL (TENORMIN) 50 MG tablet Take 1 tablet (50 mg total) by mouth every other day. 45 tablet 3    [DISCONTINUED] pantoprazole (PROTONIX) 40 MG tablet Take 1 tablet (40 mg total) by mouth once daily. 30 tablet 3    [DISCONTINUED] torsemide (DEMADEX) 20 MG Tab Take 1 tablet (20 mg total) by mouth 2 (two) times a day. (Patient taking differently: Take 20 mg by mouth once daily.) 60 tablet 1     Scheduled Meds:   amLODIPine  10 mg Oral Daily    apixaban  5 mg Oral BID    carvediloL  25 mg Oral BID    cloNIDine 0.2 mg/24 hr td ptwk  1 patch Transdermal Q7 Days    [START ON 2022] epoetin teresa (PROCRIT) injection  50 Units/kg Subcutaneous Every Tues, Thurs, Sat    famotidine  20 mg Oral Daily    FLUoxetine  20 mg Oral Daily    furosemide  40 mg Oral BID    insulin detemir U-100  5 Units Subcutaneous Daily    isosorbide mononitrate  120 mg Oral Daily    levETIRAcetam  500 mg Oral BID    mupirocin   Nasal BID     Continuous Infusions:    PRN Meds:.acetaminophen, dextrose 10%, dextrose 10%, glucagon (human recombinant), glucose, glucose, HYDROmorphone, insulin aspart U-100, naloxone, ondansetron,  "oxyCODONE-acetaminophen, prochlorperazine    Review of Systems:  Neg    Physical Exam:    BP (!) 147/85 (BP Location: Right arm, Patient Position: Lying)   Pulse 82   Temp 96.3 °F (35.7 °C) (Oral)   Resp 18   Ht 5' 2" (1.575 m)   Wt 74.8 kg (165 lb)   LMP 12/14/2021 (Approximate)   SpO2 (!) 94%   Breastfeeding No   BMI 30.18 kg/m²     Constitutional: nad, aao x 3  Heart: rrr, no m/r/g, wwp, no edema  Lungs: ctab, no w/r/r/c, no lb  Abdomen: s/nt/nd, +BS    Results:  Lab Results   Component Value Date     08/01/2022    K 4.0 08/01/2022     08/01/2022    CO2 20 (L) 08/01/2022    BUN 25 (H) 08/01/2022    CREATININE 3.7 (H) 08/01/2022    CALCIUM 7.4 (L) 08/01/2022    ANIONGAP 14 08/01/2022    ESTGFRAFRICA 20 (A) 07/31/2022    EGFRNONAA 18 (A) 07/31/2022       Lab Results   Component Value Date    CALCIUM 7.4 (L) 08/01/2022    PHOS 5.4 (H) 07/30/2022       Recent Labs   Lab 08/01/22  0404   WBC 5.52   RBC 2.60*   HGB 7.7*   HCT 22.2*   *   MCV 85   MCH 29.6   MCHC 34.7          I have personally reviewed pertinent radiological imaging and reports.     Patient care time was spent personally by me on the following activities: > 35 minutes  · Obtaining a history.  · Examination of patient.  · Providing medical care at the patients bedside.  · Developing a treatment plan with patient or surrogate and bedside caregivers.  · Ordering and reviewing laboratory studies, radiographic studies, pulse oximetry.  · Ordering and performing treatments and interventions.  · Evaluation of patient's response to treatment.  · Discussions with consultants while on the unit and immediately available to the patient.  · Re-evaluation of the patient's condition.  · Documentation in the medical record.    Geeta Kaur NP    [facility] [address] [place] [address] [phone] (p)  [phone] (f)    "

## 2022-08-01 NOTE — PLAN OF CARE
Problem: Adult Inpatient Plan of Care  Goal: Plan of Care Review  Outcome: Ongoing, Progressing  Goal: Patient-Specific Goal (Individualized)  Outcome: Ongoing, Progressing  Goal: Absence of Hospital-Acquired Illness or Injury  Outcome: Ongoing, Progressing  Goal: Optimal Comfort and Wellbeing  Outcome: Ongoing, Progressing  Goal: Readiness for Transition of Care  Outcome: Ongoing, Progressing     Problem: Renal Function Impairment (Acute Kidney Injury/Impairment)  Goal: Effective Renal Function  Outcome: Ongoing, Progressing     Problem: Infection (Pneumonia)  Goal: Resolution of Infection Signs and Symptoms  Outcome: Ongoing, Progressing     Problem: Hemodynamic Instability (Hemodialysis)  Goal: Effective Tissue Perfusion  Outcome: Ongoing, Progressing

## 2022-08-01 NOTE — TELEPHONE ENCOUNTER
NYLAOR @ 2:26pm for patient to call back to schedule a 2wk post op Lap Genie done on 7/30/22.  Ziggy

## 2022-08-01 NOTE — PROGRESS NOTES
[facility] Medicine  Progress Note    Patient Name: Tabby Howard  MRN: [id number]  Patient Class: IP- Inpatient   Admission Date: 7/28/2022  Length of Stay: 3 days  Attending Physician: Qing Breen MD  Primary Care Provider: [facility]        Subjective:     Principal Problem:Acute cholecystitis        HPI:  32F h/o ESRD on HD, DMII, dCHF, HTN p/w one day of abdominal pain found to have elevated LFTs after being discharged the day before for a hospitalization for PNA, DVT of the LUE and severe HTN.     After discharge yesterday, the patient developed right upper quadrant pain which thereafter became constant and by the day of admission was rated as severe and was associated with vomiting.  She has experienced mild relief from pain medications given in the ER.     No chest pain, shortness a breath or lightheadedness.  She was unable to dialysis on 07/28 as she presented here for her pain  ??      Overview/Hospital Course:  32F h/o ESRD on HD, DMII, dCHF, HTN p/w one day of abdominal pain found to have elevated LFTs after being discharged the day before for a hospitalization for PNA, DVT of the LUE and severe HTN.    There is concern that the patient may have acute cholecystitis.  Patient is currently undergoing HIDA scan.  Should this be consistent with acute cholecystitis the patient is NPO at midnight with intention to go to surgery on 07/30/2022.    No chest pain, shortness of breath, lightheadedness. Tolerating oral intake.  Still with abdominal pain; controlled when receives pain med.    NAD, AO3  NC, AT  RRR  CTAB  S, RUQ tender without rebound or guarding, ND+BS  No edema   PERRL    Vitals, labs and radiographs from past 24h reviewed and personally interpreted.     Assessment/Plan:      * Acute cholecystitis  S/p surgery 7/30  GI and Surgery following  LFTs likely reactive    HTN (hypertension)    -resume home regimen except currently  holding hydralazine    Deep vein thrombosis (DVT) of non-extremity vein  Restart eliquis    ESRD (end stage renal disease)  Renal following for HD needs      Type II diabetes mellitus  Patient's FSGs are controlled on current medication regimen.  Last A1c reviewed-   Lab Results   Component Value Date    HGBA1C 6.0 (H) 07/22/2022     Most recent fingerstick glucose reviewed-   Recent Labs   Lab 07/29/22  2101 07/30/22  0838   POCTGLUCOSE 182* 186*     Current correctional scale  Medium  Maintain anti-hyperglycemic dose as follows-   Antihyperglycemics (From admission, onward)            Start     Stop Route Frequency Ordered    07/29/22 0900  insulin detemir U-100 pen 5 Units         -- SubQ Daily 07/28/22 1803    07/28/22 1857  insulin aspart U-100 pen 0-5 Units         -- SubQ Before meals & nightly PRN 07/28/22 1803        Hold Oral hypoglycemics while patient is in the hospital.  Holding AM levemir; resume if required.       VTE Risk Mitigation (From admission, onward)         Ordered     apixaban tablet 5 mg  2 times daily         07/31/22 1027     IP VTE HIGH RISK PATIENT  Once         07/28/22 1803     Place sequential compression device  Until discontinued         07/28/22 1803                Discharge Planning   TATIANA:      Code Status: Full Code   Is the patient medically ready for discharge?:     Reason for patient still in hospital (select all that apply): Patient trending condition and Treatment  Discharge Plan A: Home                  Qing Breen MD  Department of Hospital Medicine   [facility]

## 2022-08-01 NOTE — CONSULTS
"Visited pt per Spiritual Care consult; remembered pt from last week's recent admit; strong Oriental orthodox bruec; provided compassionate presence and listening ear; pt asked me to pray over her and did so; she was a little weepy, but appreciated the visit and prayer, "I'll be ok, I know I'll be alright." Hard to tell if she truly believes and feels that way or trying to convince herself; no matter- I normalized her feelings and provided emotional support.  will continue to follow. Thanks for the Consult. Lord, in your mercy.  "

## 2022-08-01 NOTE — TELEPHONE ENCOUNTER
----- Message from America Starks sent at 8/1/2022  1:31 PM CDT -----  Contact: [facility]  Patient needs a 1 to 2 wk [facility] f/u appt call back at [phone] and thanks Pt has medicaid so couldn't miguel

## 2022-08-02 ENCOUNTER — TELEPHONE (OUTPATIENT)
Dept: MEDSURG UNIT | Facility: HOSPITAL | Age: 32
End: 2022-08-02
Payer: MEDICAID

## 2022-08-02 LAB
ALBUMIN SERPL BCP-MCNC: 1.7 G/DL (ref 3.5–5.2)
ALP SERPL-CCNC: 178 U/L (ref 55–135)
ALT SERPL W/O P-5'-P-CCNC: 27 U/L (ref 10–44)
ANION GAP SERPL CALC-SCNC: 10 MMOL/L (ref 8–16)
AST SERPL-CCNC: 19 U/L (ref 10–40)
BACTERIA BLD CULT: NORMAL
BACTERIA BLD CULT: NORMAL
BASOPHILS # BLD AUTO: 0.02 K/UL (ref 0–0.2)
BASOPHILS NFR BLD: 0.4 % (ref 0–1.9)
BILIRUB SERPL-MCNC: 0.6 MG/DL (ref 0.1–1)
BUN SERPL-MCNC: 27 MG/DL (ref 6–20)
CALCIUM SERPL-MCNC: 7.5 MG/DL (ref 8.7–10.5)
CHLORIDE SERPL-SCNC: 103 MMOL/L (ref 95–110)
CO2 SERPL-SCNC: 23 MMOL/L (ref 23–29)
CREAT SERPL-MCNC: 4.2 MG/DL (ref 0.5–1.4)
DIFFERENTIAL METHOD: ABNORMAL
EOSINOPHIL # BLD AUTO: 0.2 K/UL (ref 0–0.5)
EOSINOPHIL NFR BLD: 3.6 % (ref 0–8)
ERYTHROCYTE [DISTWIDTH] IN BLOOD BY AUTOMATED COUNT: 14 % (ref 11.5–14.5)
EST. GFR  (NO RACE VARIABLE): 14 ML/MIN/1.73 M^2
GLUCOSE SERPL-MCNC: 139 MG/DL (ref 70–110)
HCT VFR BLD AUTO: 21.7 % (ref 37–48.5)
HGB BLD-MCNC: 7.6 G/DL (ref 12–16)
IMM GRANULOCYTES # BLD AUTO: 0.01 K/UL (ref 0–0.04)
IMM GRANULOCYTES NFR BLD AUTO: 0.2 % (ref 0–0.5)
LYMPHOCYTES # BLD AUTO: 0.7 K/UL (ref 1–4.8)
LYMPHOCYTES NFR BLD: 13 % (ref 18–48)
MAGNESIUM SERPL-MCNC: 2 MG/DL (ref 1.6–2.6)
MCH RBC QN AUTO: 29.6 PG (ref 27–31)
MCHC RBC AUTO-ENTMCNC: 35 G/DL (ref 32–36)
MCV RBC AUTO: 84 FL (ref 82–98)
MONOCYTES # BLD AUTO: 0.4 K/UL (ref 0.3–1)
MONOCYTES NFR BLD: 6.7 % (ref 4–15)
NEUTROPHILS # BLD AUTO: 4 K/UL (ref 1.8–7.7)
NEUTROPHILS NFR BLD: 76.1 % (ref 38–73)
NRBC BLD-RTO: 0 /100 WBC
PHOSPHATE SERPL-MCNC: 4.5 MG/DL (ref 2.7–4.5)
PLATELET # BLD AUTO: 120 K/UL (ref 150–450)
PMV BLD AUTO: 10.5 FL (ref 9.2–12.9)
POCT GLUCOSE: 148 MG/DL (ref 70–110)
POCT GLUCOSE: 184 MG/DL (ref 70–110)
POTASSIUM SERPL-SCNC: 3.9 MMOL/L (ref 3.5–5.1)
PROT SERPL-MCNC: 4.5 G/DL (ref 6–8.4)
RBC # BLD AUTO: 2.57 M/UL (ref 4–5.4)
SODIUM SERPL-SCNC: 136 MMOL/L (ref 136–145)
WBC # BLD AUTO: 5.23 K/UL (ref 3.9–12.7)

## 2022-08-02 PROCEDURE — 63600175 PHARM REV CODE 636 W HCPCS: Performed by: INTERNAL MEDICINE

## 2022-08-02 PROCEDURE — 85025 COMPLETE CBC W/AUTO DIFF WBC: CPT | Performed by: HOSPITALIST

## 2022-08-02 PROCEDURE — 25000003 PHARM REV CODE 250: Performed by: INTERNAL MEDICINE

## 2022-08-02 PROCEDURE — 94761 N-INVAS EAR/PLS OXIMETRY MLT: CPT

## 2022-08-02 PROCEDURE — 80053 COMPREHEN METABOLIC PANEL: CPT | Performed by: HOSPITALIST

## 2022-08-02 PROCEDURE — 84100 ASSAY OF PHOSPHORUS: CPT | Performed by: INTERNAL MEDICINE

## 2022-08-02 PROCEDURE — 25000003 PHARM REV CODE 250: Performed by: HOSPITALIST

## 2022-08-02 PROCEDURE — 36415 COLL VENOUS BLD VENIPUNCTURE: CPT | Performed by: HOSPITALIST

## 2022-08-02 PROCEDURE — 83735 ASSAY OF MAGNESIUM: CPT | Performed by: INTERNAL MEDICINE

## 2022-08-02 RX ORDER — OXYCODONE AND ACETAMINOPHEN 5; 325 MG/1; MG/1
1 TABLET ORAL EVERY 6 HOURS PRN
Qty: 10 TABLET | Refills: 0 | Status: SHIPPED | OUTPATIENT
Start: 2022-08-02 | End: 2022-08-02 | Stop reason: SDUPTHER

## 2022-08-02 RX ORDER — HEPARIN SODIUM 1000 [USP'U]/ML
4000 INJECTION, SOLUTION INTRAVENOUS; SUBCUTANEOUS
Status: DISCONTINUED | OUTPATIENT
Start: 2022-08-02 | End: 2022-08-02 | Stop reason: HOSPADM

## 2022-08-02 RX ORDER — OXYCODONE AND ACETAMINOPHEN 5; 325 MG/1; MG/1
1 TABLET ORAL EVERY 6 HOURS PRN
Qty: 10 TABLET | Refills: 0 | Status: ON HOLD | OUTPATIENT
Start: 2022-08-02 | End: 2022-08-05 | Stop reason: SDUPTHER

## 2022-08-02 RX ORDER — HEPARIN SODIUM 1000 [USP'U]/ML
4000 INJECTION, SOLUTION INTRAVENOUS; SUBCUTANEOUS
Status: DISCONTINUED | OUTPATIENT
Start: 2022-08-02 | End: 2022-08-02

## 2022-08-02 RX ADMIN — FAMOTIDINE 20 MG: 20 TABLET ORAL at 01:08

## 2022-08-02 RX ADMIN — HEPARIN SODIUM 4000 UNITS: 1000 INJECTION, SOLUTION INTRAVENOUS; SUBCUTANEOUS at 11:08

## 2022-08-02 RX ADMIN — AMLODIPINE BESYLATE 10 MG: 5 TABLET ORAL at 01:08

## 2022-08-02 RX ADMIN — FLUOXETINE 20 MG: 20 CAPSULE ORAL at 01:08

## 2022-08-02 RX ADMIN — HYDROMORPHONE HYDROCHLORIDE 1 MG: 2 INJECTION, SOLUTION INTRAMUSCULAR; INTRAVENOUS; SUBCUTANEOUS at 05:08

## 2022-08-02 RX ADMIN — ERYTHROPOIETIN 3760 UNITS: 4000 INJECTION, SOLUTION INTRAVENOUS; SUBCUTANEOUS at 11:08

## 2022-08-02 RX ADMIN — APIXABAN 5 MG: 2.5 TABLET, FILM COATED ORAL at 01:08

## 2022-08-02 RX ADMIN — CARVEDILOL 25 MG: 25 TABLET, FILM COATED ORAL at 01:08

## 2022-08-02 RX ADMIN — OXYCODONE HYDROCHLORIDE AND ACETAMINOPHEN 1 TABLET: 5; 325 TABLET ORAL at 01:08

## 2022-08-02 RX ADMIN — MUPIROCIN: 20 OINTMENT TOPICAL at 01:08

## 2022-08-02 RX ADMIN — ISOSORBIDE MONONITRATE 120 MG: 60 TABLET, EXTENDED RELEASE ORAL at 01:08

## 2022-08-02 RX ADMIN — HYDROMORPHONE HYDROCHLORIDE 1 MG: 2 INJECTION, SOLUTION INTRAMUSCULAR; INTRAVENOUS; SUBCUTANEOUS at 02:08

## 2022-08-02 RX ADMIN — HYDROMORPHONE HYDROCHLORIDE 1 MG: 2 INJECTION, SOLUTION INTRAMUSCULAR; INTRAVENOUS; SUBCUTANEOUS at 12:08

## 2022-08-02 NOTE — PLAN OF CARE
"Pt is cleared from  for discharge.  Cm canceled script at [facility] #[id number]. CM, "Spoke with pharmacist( he did not give his name) at [phone], cm asked to cancel Oxy script. Per pharmacy, it was already canceled."     08/02/22 1513   Final Note   Assessment Type Final Discharge Note   Anticipated Discharge Disposition Home   Hospital Resources/Appts/Education Provided Appointments scheduled by Navigator/Coordinator     "

## 2022-08-02 NOTE — NURSING
Discharge instructions given, pt verbalized understanding. Telemetry box removed and returned to monitor room. PIV removed with catheter intact.

## 2022-08-02 NOTE — PLAN OF CARE
08/01/22 2026   Patient Assessment/Suction   Level of Consciousness (AVPU) alert   Respiratory Effort Normal;Unlabored   Rhythm/Pattern, Respiratory pattern regular   PRE-TX-O2   O2 Device (Oxygen Therapy) room air   SpO2 96 %   Pulse Oximetry Type Intermittent   Incentive Spirometer   $ Incentive Spirometer Charges done with encouragement   Administration (IS) proper technique demonstrated   Number of Repetitions (IS) 10   Level Incentive Spirometer (mL) 1500   Patient Tolerance (IS) good

## 2022-08-02 NOTE — DISCHARGE SUMMARY
[facility] Hospital Medicine  Discharge Summary      Patient Name: Tabby Howard  MRN: [id number]  Admission Date: 7/28/2022  Hospital Length of Stay: 4 days  Discharge Date and Time:  08/02/2022 9:36 AM  Attending Physician: Qing Breen MD   Discharging Provider: Qing Breen MD  Primary Care Provider: [facility]        HPI:        32F h/o ESRD on HD, DMII, dCHF, HTN p/w one day of abdominal pain found to have elevated LFTs after being discharged the day before for a hospitalization for PNA, DVT of the LUE and severe HTN.     After discharge yesterday, the patient developed right upper quadrant pain which thereafter became constant and by the day of admission was rated as severe and was associated with vomiting.  She has experienced mild relief from pain medications given in the ER.     No chest pain, shortness a breath or lightheadedness.  She was unable to dialysis on 07/28 as she presented here for her pain    Procedure(s) (LRB):  CHOLECYSTECTOMY, LAPAROSCOPIC (N/A)      Hospital Course:     The patient was admitted for abdominal pain and found to have acute cholecystitis.  Antibiotics were initiated and ultimately the patient went for cholecystectomy.  Pain is improving and patient will be discharged to follow with General surgery.  They have not recommended discharging on antibiotics.      * Acute cholecystitis  S/p surgery 7/30  GI and Surgery following  LFTs likely reactive     HTN (hypertension)     -resume home regimen except currently holding hydralazine     Deep vein thrombosis (DVT) of non-extremity vein  Restart eliquis     ESRD (end stage renal disease)  Renal following for HD needs        Type II diabetes mellitus  Patient's FSGs are controlled on current medication regimen.  Last A1c reviewed-         Lab Results   Component Value Date     HGBA1C 6.0 (H) 07/22/2022      Most recent fingerstick glucose reviewed-        Recent Labs    Lab 07/29/22  2101 07/30/22  0838   POCTGLUCOSE 182* 186*      Current correctional scale  Medium  Maintain anti-hyperglycemic dose as follows-              Antihyperglycemics (From admission, onward)                            Start     Stop Route Frequency Ordered     07/29/22 0900   insulin detemir U-100 pen 5 Units         -- SubQ Daily 07/28/22 1803     07/28/22 1857   insulin aspart U-100 pen 0-5 Units         -- SubQ Before meals & nightly PRN 07/28/22 1803        Resume levemir at discharge         Consults:   Consults (From admission, onward)        Status Ordering Provider     Inpatient consult to [facility] Care  Once        Provider:  (Not yet assigned)    Completed EVERTON JAFFE     Inpatient consult to Gastroenterology  Once        Provider:  Marcelo Zhong MD    Completed EVETTE GORDON     Inpatient consult to Nephrology  Once        Provider:  Hguh Figueroa MD    Completed EVETTE GORDON          Final Active Diagnoses:    Diagnosis Date Noted POA    PRINCIPAL PROBLEM:  Acute cholecystitis [K81.0] 07/30/2022 Yes    HTN (hypertension) [I10] 07/30/2022 Yes    Deep vein thrombosis (DVT) of non-extremity vein [I82.90] 07/26/2022 Yes    ESRD (end stage renal disease) [N18.6] 07/22/2022 Yes    Type II diabetes mellitus [E11.9] 10/16/2019 Yes      Problems Resolved During this Admission:      Discharged Condition: stable    Disposition: Home or Self Care    Follow Up:   Follow-up Information     Grey Perez MD Follow up.    Specialties: General Surgery, Colon and Rectal Surgery, Surgery  Why: coreen  spoke with America in scheduling, the nurse will contact pt with appointment  Contact information:  [address] [facility]  [address]  [place] [address]  [phone]             Yesi Hanks MD Follow up on 8/5/2022.    Why: Telemed at 10am  Contact information:  [address] [facility]  [place] [address]  [phone]                       Patient Instructions:      Ambulatory referral/consult to  General Surgery   Standing Status: Future   Referral Priority: Routine Referral Type: Consultation   Referral Reason: Specialty Services Required   Requested Specialty: General Surgery   Number of Visits Requested: 1     Notify your health care provider if you experience any of the following:  temperature >100.4     Notify your health care provider if you experience any of the following:  persistent nausea and vomiting or diarrhea     Notify your health care provider if you experience any of the following:  severe uncontrolled pain     Notify your health care provider if you experience any of the following:  difficulty breathing or increased cough     Notify your health care provider if you experience any of the following:  severe persistent headache     Notify your health care provider if you experience any of the following:  worsening rash     Notify your health care provider if you experience any of the following:  persistent dizziness, light-headedness, or visual disturbances     Notify your health care provider if you experience any of the following:  increased confusion or weakness     Notify your health care provider if you experience any of the following:     Medications:  Reconciled Home Medications:      Medication List      START taking these medications    oxyCODONE-acetaminophen 5-325 mg per tablet  Commonly known as: PERCOCET  Take 1 tablet by mouth every 6 (six) hours as needed for Pain.        CONTINUE taking these medications    amLODIPine 10 MG tablet  Commonly known as: NORVASC  Take 1 tablet (10 mg total) by mouth once daily.     carvediloL 25 MG tablet  Commonly known as: COREG  Take 1 tablet (25 mg total) by mouth 2 (two) times daily.     cloNIDine 0.2 mg/24 hr td ptwk 0.2 mg/24 hr  Commonly known as: CATAPRES  Place 1 patch onto the skin every 7 days.     * ELIQUIS 5 mg Tab  Generic drug: apixaban  TAKE 2 TABLETS BY MOUTH TWICE DAILY FOR 7 DAYS THEN 1 TABLET TWICE DAILY THERE AFTER     *  apixaban 5 mg Tab  Commonly known as: ELIQUIS  Take 1 tablet (5 mg total) by mouth 2 (two) times daily. For second month on.  Start taking on: 2022     famotidine 20 MG tablet  Commonly known as: PEPCID  Take 1 tablet (20 mg total) by mouth once daily.     FLUoxetine 20 MG capsule  Take 1 capsule (20 mg total) by mouth once daily.     furosemide 40 MG tablet  Commonly known as: LASIX  Take 1 tablet (40 mg total) by mouth 2 (two) times daily.     hydrALAZINE 100 MG tablet  Commonly known as: APRESOLINE  Take 1 tablet (100 mg total) by mouth every 8 (eight) hours.     HYDROcodone-acetaminophen 5-325 mg per tablet  Commonly known as: NORCO  Take 1 tablet by mouth every 6 (six) hours as needed for Pain.     insulin aspart U-100 100 unit/mL (3 mL) Inpn pen  Commonly known as: NovoLOG  Inject 1-10 Units into the skin before meals and at bedtime as needed (Hyperglycemia).     isosorbide mononitrate 60 MG 24 hr tablet  Commonly known as: IMDUR  Take 2 tablets (120 mg total) by mouth once daily.     LANTUS U-100 INSULIN 100 unit/mL injection  Generic drug: insulin glargine  SMARTSI Unit(s) SUB-Q Every Morning     levETIRAcetam 500 MG Tab  Commonly known as: KEPPRA  Take 1 tablet (500 mg total) by mouth 2 (two) times daily.     polyethylene glycol 17 gram/dose powder  Commonly known as: GLYCOLAX  Take 17 g by mouth 2 (two) times daily.         * This list has 2 medication(s) that are the same as other medications prescribed for you. Read the directions carefully, and ask your doctor or other care provider to review them with you.            STOP taking these medications    atenoloL 50 MG tablet  Commonly known as: TENORMIN     levoFLOXacin 500 MG tablet  Commonly known as: LEVAQUIN     pantoprazole 40 MG tablet  Commonly known as: PROTONIX     predniSONE 20 MG tablet  Commonly known as: DELTASONE     torsemide 20 MG Tab  Commonly known as: DEMADEX              Pending Diagnostic Studies:     Procedure Component  Value Units Date/Time    Specimen to Pathology, Surgery General Surgery [[id number]] Collected: 07/30/22 1127    Order Status: Sent Lab Status: In process Updated: 07/31/22 2316    Specimen: Tissue         Indwelling Lines/Drains at time of discharge:   Lines/Drains/Airways     Central Venous Catheter Line  Duration                Hemodialysis Catheter 07/26/22 1457 right internal jugular 6 days                Time spent on the discharge of patient: 35 minutes         Qing Breen MD  Department of Hospital Medicine  [facility]

## 2022-08-02 NOTE — PLAN OF CARE
Problem: Adult Inpatient Plan of Care  Goal: Plan of Care Review  Outcome: Ongoing, Progressing     Problem: Adult Inpatient Plan of Care  Goal: Optimal Comfort and Wellbeing  Outcome: Ongoing, Progressing     Problem: Diabetes Comorbidity  Goal: Blood Glucose Level Within Targeted Range  Outcome: Ongoing, Progressing     Problem: Oral Intake Inadequate (Acute Kidney Injury/Impairment)  Goal: Optimal Nutrition Intake  Outcome: Ongoing, Progressing     Problem: Fall Injury Risk  Goal: Absence of Fall and Fall-Related Injury  Outcome: Ongoing, Progressing

## 2022-08-02 NOTE — PROGRESS NOTES
08/02/22 1230   Handoff Report   Given To Alice LINTON   Vital Signs   Temp 98.3 °F (36.8 °C)   Temp src Oral   Pulse 90   Heart Rate Source Monitor   Resp 20   SpO2 95 %   O2 Device (Oxygen Therapy) room air   BP (!) 163/92   BP Location Right arm   BP Method Automatic   Patient Position Lying   Post-Hemodialysis Assessment   Rinseback Volume (mL) 250 mL   Blood Volume Processed (Liters) 57.9 L   Dialyzer Clearance Lightly streaked   Additional Fluid Intake (mL) 500 mL   Total UF (mL) 3500 mL   Net Fluid Removal 3000   Patient Response to Treatment Tolerated   Post-Treatment Weight 69.1 kg (152 lb 5.4 oz)   Treatment Weight Change -4.3   Post-Hemodialysis Comments HD completed per orders and protcol. No distress noted.

## 2022-08-02 NOTE — PLAN OF CARE
Problem: Adult Inpatient Plan of Care  Goal: Plan of Care Review  Outcome: Ongoing, Progressing  Goal: Patient-Specific Goal (Individualized)  Outcome: Ongoing, Progressing  Goal: Absence of Hospital-Acquired Illness or Injury  Outcome: Ongoing, Progressing  Goal: Optimal Comfort and Wellbeing  Outcome: Ongoing, Progressing     Problem: Diabetes Comorbidity  Goal: Blood Glucose Level Within Targeted Range  Outcome: Ongoing, Progressing     Problem: Oral Intake Inadequate (Acute Kidney Injury/Impairment)  Goal: Optimal Nutrition Intake  Outcome: Ongoing, Progressing

## 2022-08-02 NOTE — PROGRESS NOTES
INPATIENT NEPHROLOGY Progress Note  [facility] NEPHROLOGY    Tabby Howard  08/02/2022    Reason for consultation:    esrd    Chief Complaint:   Chief Complaint   Patient presents with    Abdominal Pain     Abdominal pain and chest pain since this am was just discharged on Wednesday        History of Present Illness:    32F h/o ESRD on HD, DMII, dCHF, HTN p/w one day of abdominal pain found to have elevated LFTs after being discharged the day before for a hospitalization for PNA, DVT of the LUE and severe HTN.      7/29 She denies neurologic symptoms, fever, urinary or bowel complaints or cough.  She has mild shortness of breath.  She has constant abdominal pain with no exacerbating or alleviating factors.  7/30 went for lap freedom; seen on HD- tolerating  7/31 no issues with HD- got off 3L, poor appetite, getting pain meds for abd pain  8/1  Lab results reviewed, VSS, will order a unit of blood w/HD tomorrow.  C/o surgical pain.  VSS.  8/2 VSS. HD today + 1 PRBC. Can be dc after HD.    Plan of Care:     ESRD on HD TTS  - continue HD TTS    HTN/Hypervolemia  - continue 2-4L UF  - continue diuretic    Acute freedom s/p lap freedom on 7/30  - recovering    Secondary HPT  - at goal- continue renal diet    Anemia of CKD  - Hb drop post op- transfuse 1 PRBC on dialysis 8/2  - continue SHELLEY with HD TTS    Thank you for allowing us to participate in this patient's care. We will continue to follow.    Vital Signs:  Temp Readings from Last 3 Encounters:   08/02/22 98 °F (36.7 °C) (Oral)   07/27/22 98.5 °F (36.9 °C) (Oral)   07/22/22 98.7 °F (37.1 °C) (Temporal)       Pulse Readings from Last 3 Encounters:   08/02/22 81   07/27/22 81   07/22/22 91       BP Readings from Last 3 Encounters:   08/02/22 (!) 153/92   07/27/22 (!) 146/72   07/22/22 (!) 153/95       Weight:  Wt Readings from Last 3 Encounters:   07/30/22 74.8 kg (165 lb)   07/26/22 75.2 kg (165 lb 12.6 oz)   07/22/22 74.8 kg (165 lb)       Medications:  No current  facility-administered medications on file prior to encounter.     Current Outpatient Medications on File Prior to Encounter   Medication Sig Dispense Refill    amLODIPine (NORVASC) 10 MG tablet Take 1 tablet (10 mg total) by mouth once daily. 30 tablet 11    apixaban (ELIQUIS) 5 mg Tab TAKE 2 TABLETS BY MOUTH TWICE DAILY FOR 7 DAYS THEN 1 TABLET TWICE DAILY THERE AFTER 74 tablet 0    [START ON 2022] apixaban (ELIQUIS) 5 mg Tab Take 1 tablet (5 mg total) by mouth 2 (two) times daily. For second month on. 60 tablet 2    carvediloL (COREG) 25 MG tablet Take 1 tablet (25 mg total) by mouth 2 (two) times daily. 60 tablet 2    cloNIDine 0.2 mg/24 hr td ptwk (CATAPRES) 0.2 mg/24 hr Place 1 patch onto the skin every 7 days. 4 patch 2    famotidine (PEPCID) 20 MG tablet Take 1 tablet (20 mg total) by mouth once daily. 30 tablet 0    FLUoxetine 20 MG capsule Take 1 capsule (20 mg total) by mouth once daily. 30 capsule 1    furosemide (LASIX) 40 MG tablet Take 1 tablet (40 mg total) by mouth 2 (two) times daily. 60 tablet 0    hydrALAZINE (APRESOLINE) 100 MG tablet Take 1 tablet (100 mg total) by mouth every 8 (eight) hours. 90 tablet 2    HYDROcodone-acetaminophen (NORCO) 5-325 mg per tablet Take 1 tablet by mouth every 6 (six) hours as needed for Pain. 20 tablet 0    insulin aspart U-100 (NOVOLOG) 100 unit/mL (3 mL) InPn pen Inject 1-10 Units into the skin before meals and at bedtime as needed (Hyperglycemia). 3 mL 3    isosorbide mononitrate (IMDUR) 60 MG 24 hr tablet Take 2 tablets (120 mg total) by mouth once daily. 30 tablet 1    LANTUS U-100 INSULIN 100 unit/mL injection SMARTSI Unit(s) SUB-Q Every Morning      levETIRAcetam (KEPPRA) 500 MG Tab Take 1 tablet (500 mg total) by mouth 2 (two) times daily. 60 tablet 1    polyethylene glycol (GLYCOLAX) 17 gram/dose powder Take 17 g by mouth 2 (two) times daily. 510 each 0    [DISCONTINUED] atenoloL (TENORMIN) 50 MG tablet Take 1 tablet (50 mg total)  "by mouth every other day. 45 tablet 3    [DISCONTINUED] levoFLOXacin (LEVAQUIN) 500 MG tablet Take 1 tablet (500 mg total) by mouth every other day. for 1 day 1 tablet 0    [DISCONTINUED] pantoprazole (PROTONIX) 40 MG tablet Take 1 tablet (40 mg total) by mouth once daily. 30 tablet 3    [DISCONTINUED] predniSONE (DELTASONE) 20 MG tablet Take 3 tablets (60 mg total) by mouth once daily for 4 days, THEN 2 tablets (40 mg total) once daily for 4 days, THEN 1 tablet (20 mg total) once daily for 4 days, THEN 0.5 tablets (10 mg total) once daily for 4 days. 26 tablet 0    [DISCONTINUED] torsemide (DEMADEX) 20 MG Tab Take 1 tablet (20 mg total) by mouth 2 (two) times a day. (Patient taking differently: Take 20 mg by mouth once daily.) 60 tablet 1     Scheduled Meds:   amLODIPine  10 mg Oral Daily    apixaban  5 mg Oral BID    carvediloL  25 mg Oral BID    cloNIDine 0.2 mg/24 hr td ptwk  1 patch Transdermal Q7 Days    epoetin teresa (PROCRIT) injection  50 Units/kg Subcutaneous Every Tues, Thurs, Sat    famotidine  20 mg Oral Daily    FLUoxetine  20 mg Oral Daily    furosemide  40 mg Oral BID    insulin detemir U-100  4 Units Subcutaneous Daily    isosorbide mononitrate  120 mg Oral Daily    levETIRAcetam  500 mg Oral BID    mupirocin   Nasal BID     Continuous Infusions:    PRN Meds:.acetaminophen, dextrose 10%, dextrose 10%, glucagon (human recombinant), glucose, glucose, HYDROmorphone, insulin aspart U-100, naloxone, ondansetron, oxyCODONE-acetaminophen, prochlorperazine    Review of Systems:  Neg    Physical Exam:    BP (!) 153/92 (BP Location: Right arm, Patient Position: Lying)   Pulse 81   Temp 98 °F (36.7 °C) (Oral)   Resp 20   Ht 5' 2" (1.575 m)   Wt 74.8 kg (165 lb)   LMP 12/14/2021 (Approximate)   SpO2 (!) 94%   Breastfeeding No   BMI 30.18 kg/m²     Constitutional: nad, aao x 3  Heart: rrr, no m/r/g, wwp, no edema  Lungs: ctab, no w/r/r/c, no lb  Abdomen: s/nt/nd, +BS    Results:  Lab " Results   Component Value Date     08/02/2022    K 3.9 08/02/2022     08/02/2022    CO2 23 08/02/2022    BUN 27 (H) 08/02/2022    CREATININE 4.2 (H) 08/02/2022    CALCIUM 7.5 (L) 08/02/2022    ANIONGAP 10 08/02/2022    ESTGFRAFRICA 20 (A) 07/31/2022    EGFRNONAA 18 (A) 07/31/2022       Lab Results   Component Value Date    CALCIUM 7.5 (L) 08/02/2022    PHOS 4.5 08/02/2022       Recent Labs   Lab 08/02/22  0542   WBC 5.23   RBC 2.57*   HGB 7.6*   HCT 21.7*   *   MCV 84   MCH 29.6   MCHC 35.0        Patient care time was spent personally by me on the following activities: >  minutes  · Obtaining a history.  · Examination of patient.  · Providing medical care at the patients bedside.  · Developing a treatment plan with patient or surrogate and bedside caregivers.  · Ordering and reviewing laboratory studies, radiographic studies, pulse oximetry.  · Ordering and performing treatments and interventions.  · Evaluation of patient's response to treatment.  · Discussions with consultants while on the unit and immediately available to the patient.  · Re-evaluation of the patient's condition.  · Documentation in the medical record.    Mando Benitez MD    [facility]  [address] [place] [id number]  [phone] (p)  [phone] (f)

## 2022-08-03 ENCOUNTER — PATIENT OUTREACH (OUTPATIENT)
Dept: ADMINISTRATIVE | Facility: CLINIC | Age: 32
End: 2022-08-03
Payer: MEDICAID

## 2022-08-03 ENCOUNTER — PATIENT MESSAGE (OUTPATIENT)
Dept: ADMINISTRATIVE | Facility: CLINIC | Age: 32
End: 2022-08-03
Payer: MEDICAID

## 2022-08-03 ENCOUNTER — TELEPHONE (OUTPATIENT)
Dept: MEDSURG UNIT | Facility: HOSPITAL | Age: 32
End: 2022-08-03
Payer: MEDICAID

## 2022-08-03 VITALS
OXYGEN SATURATION: 95 % | HEART RATE: 90 BPM | WEIGHT: 165 LBS | TEMPERATURE: 98 F | DIASTOLIC BLOOD PRESSURE: 92 MMHG | BODY MASS INDEX: 30.36 KG/M2 | HEIGHT: 62 IN | SYSTOLIC BLOOD PRESSURE: 163 MMHG | RESPIRATION RATE: 20 BRPM

## 2022-08-03 NOTE — PROGRESS NOTES
C3 nurse attempted to contact Tabby Howard for a TCC post hospital discharge follow up call. No answer. Left voicemail with callback information. Message sent via [facility] portal for Post Discharge Attempt with callback information. Left message with father. The patient has a scheduled telemed HOSFU appointment with Yesi Hanks MD on 8/5/2022 @ 1000 per AVS. Provider not within [place].

## 2022-08-03 NOTE — DISCHARGE SUMMARY
"[facility] Medicine  Discharge Summary      Patient Name: Tabby Howard  MRN: [id number]  Patient Class: IP- Inpatient  Admission Date: 2022  Hospital Length of Stay: 10 days  Discharge Date and Time: 2022 12:30 PM  Attending Physician: No att. providers found   Discharging Provider: Keegan Cody MD  Primary Care Provider: [facility]      HPI:   Tabby Howard is a 32 y.o. female with a PMHx of DM II, supraventricular tachycardia, heart failure with preserved ejection fraction (22), Sickle cell trait, and CKD (stage IV) who states that she came to the ED because of abnormal labs. She has been having shortness of breathness, feeling lethargic and generalized weakness for few weeks but has been feeling worse.  Patient had lab work done this morning and was informed of abnormal "kidney" results with referral to the ED for further evaluation and work up.  She endorses diffuse back pain over the past month, intermittent chills as well as diarrhea a few days ago that has since improved.  Patient specifies she is able to urinate but notes that the volume has decreased.  Also states that her  leg swelling has been getting worse due to her being "full of fluid."  LMP was in 2021.  The patient denies fever, N/V, abdominal pain on exam, CP or any other symptoms at this time.  PSHx includes EGD and .  Does not see a nephrologist for her CKD     The history is provided by the patient.          Procedure(s) (LRB):  INSERTION-CATHETER-JOSEPH (Left)      Hospital Course:   Patient was admitted for acute on chronic kidney disease with hyperkalemia and acidosis.  Patient was also found to be in hypertensive emergency.  On arrival patient was anemic Hb 4. Patient was transfused 2 units of blood with her new dialysis.  On arrival K 6.4, CO2 15. Nephrology was consulted patient was started on hemodialysis after dialysis access " was placed.  For blood pressure control started NTG gtt and when blood pressure was not controlled with nitro drip patient was switched to nicardipine gtt patient continued to be oliguric, hemoglobin improved after cut transition, hematology was consulted for further evaluation of anemia.  Workup showed hemolytic anemia which is attributed hemolysis during hypertensive emergency.  Patient's potassium improved acidosis improved after hemodialysis.  Tunneled catheter was placed 7/12. She was started on IV steroid by hematology, after which insulin had to be increased for hyperglycemia. Patient continued to improve and was medically stable for discharge       Goals of Care Treatment Preferences:  Code Status: Full Code          What is most important right now is to focus on remaining as independent as possible.  Accordingly, we have decided that the best plan to meet the patient's goals includes continuing with treatment.      Consults:   Consults (From admission, onward)        Status Ordering Provider     Inpatient consult to Registered Dietitian/Nutritionist  Once        Provider:  (Not yet assigned)    Completed EVERTON JAFFE     Inpatient consult to Social Work/Case Management  Once        Provider:  (Not yet assigned)    Completed EVERTON JAFFE     Inpatient consult to General Surgery  Once        Provider:  Dionte Gan MD    Completed MELITA GROSSMAN     Inpatient consult to General Surgery  Once        Provider:  Lillian Yee MD    Completed NING BILLY     Inpatient consult to Nephrology  Once        Provider:  Prateek Clark MD    Completed SHIRLEY DEWEY          No new Assessment & Plan notes have been filed under this hospital service since the last note was generated.  Service: Hospital Medicine    Final Active Diagnoses:    Diagnosis Date Noted POA    PRINCIPAL PROBLEM:  Acute renal failure superimposed on chronic kidney disease [N17.9, N18.9] 07/07/2022 Yes    Anemia  [D64.9] 07/07/2022 Yes    Seizure [R56.9] 05/29/2022 Yes    CKD (chronic kidney disease), stage IV [N18.4] 04/12/2022 Yes    Sickle cell trait [D57.3] 04/12/2022 Yes    Pericardial effusion [I31.3] 03/07/2022 Yes    Anemia of chronic kidney failure [N18.9, D63.1] 04/24/2021 Yes    Type II diabetes mellitus [E11.9] 10/16/2019 Yes      Problems Resolved During this Admission:    Diagnosis Date Noted Date Resolved POA    Hypertensive emergency [I16.1] 07/08/2022 07/16/2022 Yes    Hyperkalemia [E87.5] 07/07/2022 07/08/2022 Yes       Discharged Condition: stable    Disposition: Home or Self Care    Follow Up:   Follow-up Information     Access [facility]. Schedule an appointment as soon as possible for a visit.    Contact information:  [address] [facility] [address]  [phone]             Jeevan Simmons MD. Schedule an appointment as soon as possible for a visit in 1 week(s).    Specialty: Hematology and Oncology  Contact information:  [address]  [address]  [facility] [address]  [phone]             Mando Benitez MD. Call in 1 week(s).    Specialty: Nephrology  Why: Per scheduling, please call office once discharged to make follow up appointment.  Contact information:  [address] [facility] [place]  [place] [address]  [phone]                       Patient Instructions:      Activity as tolerated     Activity as tolerated       Significant Diagnostic Studies: Labs:   CMP   Recent Labs   Lab 08/02/22  0542      K 3.9      CO2 23   *   BUN 27*   CREATININE 4.2*   CALCIUM 7.5*   PROT 4.5*   ALBUMIN 1.7*   BILITOT 0.6   ALKPHOS 178*   AST 19   ALT 27   ANIONGAP 10    and CBC   Recent Labs   Lab 08/02/22  0542   WBC 5.23   HGB 7.6*   HCT 21.7*   *     Microbiology:   Blood Culture   Lab Results   Component Value Date    LABBLOO No growth after 5 days. 07/28/2022   , Sputum Culture   Lab Results   Component Value Date     GSRESP <10 epithelial cells per low power field. 2022    GSRESP Rare WBC's 2022    GSRESP No organisms seen 2022    RESPIRATORYC No growth 2022    and Urine Culture    Lab Results   Component Value Date    LABURIN No significant growth 2021       Pending Diagnostic Studies:     None         Medications:  Reconciled Home Medications:      Medication List      START taking these medications    amLODIPine 10 MG tablet  Commonly known as: NORVASC  Take 1 tablet (10 mg total) by mouth once daily.     famotidine 20 MG tablet  Commonly known as: PEPCID  Take 1 tablet (20 mg total) by mouth once daily.     HYDROcodone-acetaminophen 5-325 mg per tablet  Commonly known as: NORCO  Take 1 tablet by mouth every 6 (six) hours as needed for Pain.     insulin aspart U-100 100 unit/mL (3 mL) Inpn pen  Commonly known as: NovoLOG  Inject 1-10 Units into the skin before meals and at bedtime as needed (Hyperglycemia).     polyethylene glycol 17 gram/dose powder  Commonly known as: GLYCOLAX  Take 17 g by mouth 2 (two) times daily.        CHANGE how you take these medications    isosorbide mononitrate 60 MG 24 hr tablet  Commonly known as: IMDUR  Take 2 tablets (120 mg total) by mouth once daily.  What changed: how much to take        CONTINUE taking these medications    FLUoxetine 20 MG capsule  Take 1 capsule (20 mg total) by mouth once daily.     LANTUS U-100 INSULIN 100 unit/mL injection  Generic drug: insulin glargine  SMARTSI Unit(s) SUB-Q Every Morning     levETIRAcetam 500 MG Tab  Commonly known as: KEPPRA  Take 1 tablet (500 mg total) by mouth 2 (two) times daily.        STOP taking these medications    furosemide 40 MG tablet  Commonly known as: LASIX     pantoprazole 40 MG tablet  Commonly known as: PROTONIX     torsemide 20 MG Tab  Commonly known as: DEMADEX            Indwelling Lines/Drains at time of discharge:   Lines/Drains/Airways     None                 Time spent on the discharge of  patient: 60 minutes         Keegan Cody MD  Department of Hospital Medicine  [facility]

## 2022-08-03 NOTE — PROGRESS NOTES
Second attempt  C3 nurse attempted to contact Tabby Howard for a TCC post hospital discharge follow up call. No answer. No voicemail available. The patient has a scheduled HOSFU appointment with Yesi Hanks MD on 8/5/2022 @ 7311.

## 2022-08-04 PROBLEM — R10.9 ABDOMINAL PAIN: Status: ACTIVE | Noted: 2018-09-15

## 2022-08-04 PROBLEM — R07.9 CHEST PAIN: Status: ACTIVE | Noted: 2022-08-04

## 2022-08-04 LAB
FINAL PATHOLOGIC DIAGNOSIS: NORMAL
GROSS: NORMAL
Lab: NORMAL

## 2022-08-04 NOTE — PROGRESS NOTES
Third attempt  C3 nurse attempted to contact patient for a TCC post hospital discharge follow-up call. The patient no longer eligible for TCC Post Discharge call.  Presented to ED on 8/3/2022 now in Observation.

## 2022-08-08 ENCOUNTER — TELEPHONE (OUTPATIENT)
Dept: MEDSURG UNIT | Facility: HOSPITAL | Age: 32
End: 2022-08-08
Payer: MEDICAID

## 2022-08-09 ENCOUNTER — HOSPITAL ENCOUNTER (EMERGENCY)
Facility: HOSPITAL | Age: 32
Discharge: HOME | End: 2022-08-09
Attending: EMERGENCY MEDICINE
Payer: MEDICAID

## 2022-08-09 VITALS
SYSTOLIC BLOOD PRESSURE: 182 MMHG | RESPIRATION RATE: 18 BRPM | DIASTOLIC BLOOD PRESSURE: 112 MMHG | BODY MASS INDEX: 30.36 KG/M2 | TEMPERATURE: 98 F | HEIGHT: 62 IN | HEART RATE: 78 BPM | WEIGHT: 165 LBS | OXYGEN SATURATION: 100 %

## 2022-08-09 DIAGNOSIS — R07.9 CHEST PAIN: ICD-10-CM

## 2022-08-09 LAB
ALBUMIN SERPL BCP-MCNC: 2.4 G/DL (ref 3.5–5.2)
ALP SERPL-CCNC: 143 U/L (ref 55–135)
ALT SERPL W/O P-5'-P-CCNC: 10 U/L (ref 10–44)
ANION GAP SERPL CALC-SCNC: 13 MMOL/L (ref 8–16)
AST SERPL-CCNC: 12 U/L (ref 10–40)
BASOPHILS # BLD AUTO: 0.04 K/UL (ref 0–0.2)
BASOPHILS NFR BLD: 0.6 % (ref 0–1.9)
BILIRUB SERPL-MCNC: 1 MG/DL (ref 0.1–1)
BUN SERPL-MCNC: 39 MG/DL (ref 6–20)
CALCIUM SERPL-MCNC: 8.3 MG/DL (ref 8.7–10.5)
CHLORIDE SERPL-SCNC: 100 MMOL/L (ref 95–110)
CO2 SERPL-SCNC: 23 MMOL/L (ref 23–29)
CREAT SERPL-MCNC: 4.6 MG/DL (ref 0.5–1.4)
DIFFERENTIAL METHOD: ABNORMAL
EOSINOPHIL # BLD AUTO: 0 K/UL (ref 0–0.5)
EOSINOPHIL NFR BLD: 0.6 % (ref 0–8)
ERYTHROCYTE [DISTWIDTH] IN BLOOD BY AUTOMATED COUNT: 13.2 % (ref 11.5–14.5)
EST. GFR  (NO RACE VARIABLE): 12 ML/MIN/1.73 M^2
GLUCOSE SERPL-MCNC: 390 MG/DL (ref 70–110)
HCG SERPL QL: NEGATIVE
HCT VFR BLD AUTO: 22.7 % (ref 37–48.5)
HGB BLD-MCNC: 8.3 G/DL (ref 12–16)
IMM GRANULOCYTES # BLD AUTO: 0.09 K/UL (ref 0–0.04)
IMM GRANULOCYTES NFR BLD AUTO: 1.3 % (ref 0–0.5)
LIPASE SERPL-CCNC: 18 U/L (ref 4–60)
LYMPHOCYTES # BLD AUTO: 0.5 K/UL (ref 1–4.8)
LYMPHOCYTES NFR BLD: 7.8 % (ref 18–48)
MCH RBC QN AUTO: 29.5 PG (ref 27–31)
MCHC RBC AUTO-ENTMCNC: 36.6 G/DL (ref 32–36)
MCV RBC AUTO: 81 FL (ref 82–98)
MONOCYTES # BLD AUTO: 0.4 K/UL (ref 0.3–1)
MONOCYTES NFR BLD: 5.5 % (ref 4–15)
NEUTROPHILS # BLD AUTO: 5.6 K/UL (ref 1.8–7.7)
NEUTROPHILS NFR BLD: 84.2 % (ref 38–73)
NRBC BLD-RTO: 0 /100 WBC
PLATELET # BLD AUTO: 149 K/UL (ref 150–450)
PMV BLD AUTO: 8.9 FL (ref 9.2–12.9)
POTASSIUM SERPL-SCNC: 3.7 MMOL/L (ref 3.5–5.1)
PROT SERPL-MCNC: 5.9 G/DL (ref 6–8.4)
RBC # BLD AUTO: 2.81 M/UL (ref 4–5.4)
SODIUM SERPL-SCNC: 136 MMOL/L (ref 136–145)
WBC # BLD AUTO: 6.69 K/UL (ref 3.9–12.7)

## 2022-08-09 PROCEDURE — 80053 COMPREHEN METABOLIC PANEL: CPT | Performed by: EMERGENCY MEDICINE

## 2022-08-09 PROCEDURE — 93010 EKG 12-LEAD: ICD-10-PCS | Mod: ,,, | Performed by: INTERNAL MEDICINE

## 2022-08-09 PROCEDURE — 93005 ELECTROCARDIOGRAM TRACING: CPT

## 2022-08-09 PROCEDURE — 96374 THER/PROPH/DIAG INJ IV PUSH: CPT | Mod: 59

## 2022-08-09 PROCEDURE — 93010 ELECTROCARDIOGRAM REPORT: CPT | Mod: ,,, | Performed by: INTERNAL MEDICINE

## 2022-08-09 PROCEDURE — 84703 CHORIONIC GONADOTROPIN ASSAY: CPT | Performed by: EMERGENCY MEDICINE

## 2022-08-09 PROCEDURE — 36415 COLL VENOUS BLD VENIPUNCTURE: CPT | Performed by: EMERGENCY MEDICINE

## 2022-08-09 PROCEDURE — 96361 HYDRATE IV INFUSION ADD-ON: CPT | Mod: 59

## 2022-08-09 PROCEDURE — 25500020 PHARM REV CODE 255

## 2022-08-09 PROCEDURE — 85025 COMPLETE CBC W/AUTO DIFF WBC: CPT | Performed by: EMERGENCY MEDICINE

## 2022-08-09 PROCEDURE — 83690 ASSAY OF LIPASE: CPT | Performed by: EMERGENCY MEDICINE

## 2022-08-09 PROCEDURE — 99285 EMERGENCY DEPT VISIT HI MDM: CPT | Mod: 25

## 2022-08-09 PROCEDURE — 25000003 PHARM REV CODE 250: Performed by: EMERGENCY MEDICINE

## 2022-08-09 PROCEDURE — 63600175 PHARM REV CODE 636 W HCPCS: Performed by: EMERGENCY MEDICINE

## 2022-08-09 RX ORDER — ONDANSETRON 4 MG/1
4 TABLET, ORALLY DISINTEGRATING ORAL EVERY 8 HOURS PRN
Qty: 12 TABLET | Refills: 0 | Status: ON HOLD | OUTPATIENT
Start: 2022-08-09 | End: 2022-11-01 | Stop reason: SDUPTHER

## 2022-08-09 RX ORDER — ACETAMINOPHEN 500 MG
1000 TABLET ORAL
Status: COMPLETED | OUTPATIENT
Start: 2022-08-09 | End: 2022-08-09

## 2022-08-09 RX ORDER — DROPERIDOL 2.5 MG/ML
2.5 INJECTION, SOLUTION INTRAMUSCULAR; INTRAVENOUS
Status: COMPLETED | OUTPATIENT
Start: 2022-08-09 | End: 2022-08-09

## 2022-08-09 RX ORDER — MAG HYDROX/ALUMINUM HYD/SIMETH 200-200-20
30 SUSPENSION, ORAL (FINAL DOSE FORM) ORAL
Status: COMPLETED | OUTPATIENT
Start: 2022-08-09 | End: 2022-08-09

## 2022-08-09 RX ORDER — OMEPRAZOLE 20 MG/1
40 CAPSULE, DELAYED RELEASE ORAL DAILY
Qty: 20 CAPSULE | Refills: 0 | Status: SHIPPED | OUTPATIENT
Start: 2022-08-09 | End: 2022-08-26

## 2022-08-09 RX ADMIN — ACETAMINOPHEN 1000 MG: 500 TABLET ORAL at 02:08

## 2022-08-09 RX ADMIN — IOHEXOL 75 ML: 350 INJECTION, SOLUTION INTRAVENOUS at 03:08

## 2022-08-09 RX ADMIN — SODIUM CHLORIDE 1000 ML: 0.9 INJECTION, SOLUTION INTRAVENOUS at 01:08

## 2022-08-09 RX ADMIN — ALUMINUM HYDROXIDE, MAGNESIUM HYDROXIDE, AND SIMETHICONE 30 ML: 200; 200; 20 SUSPENSION ORAL at 03:08

## 2022-08-09 RX ADMIN — DROPERIDOL 2.5 MG: 2.5 INJECTION, SOLUTION INTRAMUSCULAR; INTRAVENOUS at 01:08

## 2022-08-09 NOTE — DISCHARGE INSTRUCTIONS
You have a pericardial effusion along with some lung effusions that should be monitored for resolution with continuing dialysis.  Should follow this up with your PCP or Nephrology doctor.

## 2022-08-09 NOTE — ED NOTES
Tabby Howard presents to the ED with c/o upper abdominal pain that started after her dialysis treatment today. Patient reports nausea and emesis. Father is at the bedside and reports that patient missed her dialysis treatment on Saturday. Mucous membranes are pink and moist. Skin is warm, dry and intact.  ALLEN ACHARYA  Verified patient's name and date of birth.

## 2022-08-09 NOTE — ED PROVIDER NOTES
Encounter Date: 2022    SCRIBE #1 NOTE: ICharissa, nini scribing for, and in the presence of, Kaushik Patton MD.       History     Chief Complaint   Patient presents with    Chest Pain     With upper abd pain, started this morning.      Time seen by provider: 1:24 PM on 2022    Tabyb Howard is a 32 y.o. female with PMHx of DM, gastroparesis, ESRD on hemodialysis, CHF, and DVT on anticoagulation who presents to the ED via EMS with an onset of non-radiating CP and abdominal pain. Patient was recently admitted for CP and abdominal pain. She underwent laparoscopic cholecystectomy for cholecystitis ~2 weeks ago (22). Upon admission, there was no acute process found on CT. Troponin was mildly positive trending but ACS was ruled out. The patient was at dialysis today when she began experiencing burning CP and upper abdominal pain. She also reports N/V. She denies completing dialysis treatment. Sx are consistent with Sx she was experiencing upon recent admission. She denies diarrhea, blood in stool, fever, or any other symptoms at this time. PSHx of EGD, C section, placement of dual-lumen vascular catheter.    The history is provided by the EMS personnel and the patient.     Review of patient's allergies indicates:   Allergen Reactions    Penicillins Hives     Past Medical History:   Diagnosis Date    CKD (chronic kidney disease), stage IV 2022    Diabetes mellitus due to underlying condition with unspecified complications 2022    Gastroparesis 2022    Heart failure with preserved ejection fraction 2022    EF 55% on 3/22    History of gastroesophageal reflux (GERD)     History of supraventricular tachycardia     Hyperkalemia 2022    Hypertensive emergency 2022    Sickle cell trait 2022    Type 2 diabetes mellitus      Past Surgical History:   Procedure Laterality Date     SECTION      x 3    ESOPHAGOGASTRODUODENOSCOPY N/A 10/18/2019     Procedure: ESOPHAGOGASTRODUODENOSCOPY (EGD);  Surgeon: Gianluca Mendez MD;  Location: [facility];  Service: Endoscopy;  Laterality: N/A;    LAPAROSCOPIC CHOLECYSTECTOMY N/A 7/30/2022    Procedure: CHOLECYSTECTOMY, LAPAROSCOPIC;  Surgeon: Grey Perez MD;  Location[facility];  Service: General;  Laterality: N/A;    PLACEMENT OF DUAL-LUMEN VASCULAR CATHETER Left 7/12/2022    Procedure: INSERTION-CATHETER-JOSEPH;  Surgeon: Dionte Gan MD;  Location: [facility] OR;  Service: General;  Laterality: Left;    PLACEMENT OF DUAL-LUMEN VASCULAR CATHETER Right 7/26/2022    Procedure: INSERTION-CATHETER-Hemosplit;  Surgeon: Dionte Gan MD;  Location: [facility] OR;  Service: General;  Laterality: Right;     Family History   Problem Relation Age of Onset    Diabetes Mother     Diabetes Father      Social History     Tobacco Use    Smoking status: Never Smoker    Smokeless tobacco: Never Used   Substance Use Topics    Alcohol use: No    Drug use: No     Review of Systems   Constitutional: Negative for fever.   HENT: Negative for sore throat.    Respiratory: Negative for shortness of breath.    Cardiovascular: Positive for chest pain.   Gastrointestinal: Positive for abdominal pain, nausea and vomiting. Negative for blood in stool and diarrhea.   Genitourinary: Negative for dysuria.   Musculoskeletal: Negative for back pain.   Skin: Negative for rash.   Neurological: Negative for weakness.   Hematological: Bruises/bleeds easily.       Physical Exam     Initial Vitals   BP Pulse Resp Temp SpO2   08/09/22 1325 08/09/22 1325 08/09/22 1325 08/09/22 1424 08/09/22 1325   (!) 158/105 79 (!) 22 98.2 °F (36.8 °C) 100 %      MAP       --                Physical Exam    Nursing note and vitals reviewed.  Constitutional: She appears well-developed and well-nourished. She is not diaphoretic. No distress.   Mildly uncomfortable secondary to pain.   HENT:   Head: Normocephalic and atraumatic.   Mouth/Throat: Oropharynx is clear and moist.    Eyes: Conjunctivae are normal. No scleral icterus.   Neck: Neck supple.   Cardiovascular: Normal rate, regular rhythm, normal heart sounds and intact distal pulses. Exam reveals no gallop and no friction rub.    No murmur heard.  Pulmonary/Chest: Breath sounds normal. She has no wheezes. She has no rhonchi. She has no rales.   Abdominal: Abdomen is soft. She exhibits no distension and no mass. There is abdominal tenderness. No hernia.   Diffuse mild abdominal tenderness more upper and epigastric. There is no rebound and no guarding.   Musculoskeletal:         General: Normal range of motion.      Cervical back: Neck supple.     Neurological: She is alert and oriented to person, place, and time.   Skin: Capillary refill takes less than 2 seconds. No rash noted. No erythema.   Psychiatric: She has a normal mood and affect. Her speech is normal. Thought content normal.         ED Course   Procedures  Labs Reviewed   CBC W/ AUTO DIFFERENTIAL - Abnormal; Notable for the following components:       Result Value    RBC 2.81 (*)     Hemoglobin 8.3 (*)     Hematocrit 22.7 (*)     MCV 81 (*)     MCHC 36.6 (*)     Platelets 149 (*)     MPV 8.9 (*)     Immature Granulocytes 1.3 (*)     Immature Grans (Abs) 0.09 (*)     Lymph # 0.5 (*)     Gran % 84.2 (*)     Lymph % 7.8 (*)     All other components within normal limits   COMPREHENSIVE METABOLIC PANEL - Abnormal; Notable for the following components:    Glucose 390 (*)     BUN 39 (*)     Creatinine 4.6 (*)     Calcium 8.3 (*)     Total Protein 5.9 (*)     Albumin 2.4 (*)     Alkaline Phosphatase 143 (*)     eGFR 12 (*)     All other components within normal limits   LIPASE   PREGNANCY TEST SCREENING, SERUM          Imaging Results          CT Abdomen Pelvis With Contrast (Final result)  Result time 08/09/22 16:08:34    Final result by Kaushik Gutierrez MD (08/09/22 16:08:34)                 Impression:      1. Nonspecific wall thickening of the stomach which could relate to  inadequate distension.  Gastritis or peptic ulcer disease also possible in the appropriate clinical setting.  2. Recent cholecystectomy with high density material along the operative bed which could relate to hematoma or surgical products such as Surgicel.  3. Volume overload with pericardial effusion left pleural effusion, anasarca and ascites.  4. Urinary bladder wall thickening which could relate to outlet obstruction or cystitis.      Electronically signed by: Kaushik Gutierrez  Date:    08/09/2022  Time:    16:08             Narrative:    EXAMINATION:  CT ABDOMEN PELVIS WITH CONTRAST    CLINICAL HISTORY:  Epigastric pain;Lap cholex 7/30/22;    TECHNIQUE:  Low dose axial images, sagittal and coronal reformations were obtained from the lung bases to the pubic symphysis following the IV administration of 75 mL of Omnipaque 350 .  Oral contrast was not given.    COMPARISON:  08/03/2022    FINDINGS:  Mild atelectasis left lower lobe.  Left pleural fluid.  Borderline cardiomegaly with pericardial fluid.  There is a vascular access catheter tip in the right atrium.  Cholecystectomy clips.  Along the gallbladder fossa there is a 5.8 cm density collection.    Solid organs including liver spleen pancreas adrenal glands and kidneys are unremarkable.    There is no enteric contrast which limits bowel assessment.  There is mild wall thickening of the stomach.  No dilated bowel loops.  Appendix.    Atherosclerosis, atypical for age.  Urinary bladder wall thickening.  Unremarkable uterus.  Ascites.  Body wall edema.    No acute osseous abnormality.                            (rad read)    The patient was informed of the incidental finding(s) as well as the need for PCP or specialist follow-up for reevaluation and possible further investigation or monitoring.       Medications   sodium chloride 0.9% bolus 1,000 mL (0 mLs Intravenous Stopped 8/9/22 5169)   acetaminophen tablet 1,000 mg (1,000 mg Oral Given 8/9/22 1422)    droperidoL injection 2.5 mg (2.5 mg Intravenous Given 8/9/22 1336)   aluminum-magnesium hydroxide-simethicone 200-200-20 mg/5 mL suspension 30 mL (30 mLs Oral Given 8/9/22 1511)   iohexoL (OMNIPAQUE 350) 350 mg iodine/mL injection (75 mLs Intravenous Given 8/9/22 1550)     Medical Decision Making:   History:   Old Medical Records: I decided to obtain old medical records.          Scribe Attestation:   Scribe #1: I performed the above scribed service and the documentation accurately describes the services I performed. I attest to the accuracy of the note.        ED Course as of 08/09/22 2140   Tue Aug 09, 2022   1333 EKG:  Normal sinus rhythm at a rate of 81.  Normal intervals.  Left axis.  No significant ST or T wave changes suggesting acute ischemia or infarction.   [MR]   1647 Bedside ultrasound shows mild-to-moderate pericardial effusion.   [MR]      ED Course User Index  [MR] Kaushik Patton MD           I, Dr. Kaushik Patton, personally performed the services described in this documentation. All medical record entries made by the scribe were at my direction and in my presence.  I have reviewed the chart and agree that the record reflects my personal performance and is accurate and complete. Kaushik Patton MD.  9:39 PM 08/09/2022    Tabby Howard is a 32 y.o. female presenting with recurrent presentation of upper abdominal pain radiating to the chest identical to prior presentation likely relating to chronic, ongoing upper GI issues such as gastroparesis with possible component of gastritis or reflux.  She has improved antiemetics and IV fluids here.  I have addressed pain with acetaminophen as well as antacids.  Given strong possibility of gastroparesis, I do not think opioids be in patient's best interest per typical GI society guidelines.  Extensive workup here shows no other acute intra-abdominal process other nonspecific gastritis.  There is incidental pericardial effusion.  There is no  sign of cardiac tamponade.  She does not require admission for this finding and may be followed up on an outpatient.  I do not think she requires pericardial window or pericardiocentesis.  I doubt pericarditis or myocarditis.  I do not think further cardiac biomarker is indicated.  I have very low suspicion for ACS.  EKG reviewed.  I have very low suspicion for PE aortic dissection.  I do not think CT of the chest is indicated.  She continues on anticoagulation.  Close outpatient follow-up with PCP and GI recommended with initiation of PPI and recommendations for antiemetics and antacids as necessary.  Detailed return precautions reviewed.    Clinical Impression:   Final diagnoses:  [R07.9] Chest pain          ED Disposition Condition    Discharge Stable        ED Prescriptions     Medication Sig Dispense Start Date End Date Auth. Provider    omeprazole (PRILOSEC) 20 MG capsule Take 2 capsules (40 mg total) by mouth once daily. for 10 days 20 capsule 8/9/2022 8/19/2022 Kaushik Patton MD    ondansetron (ZOFRAN-ODT) 4 MG TbDL Take 1 tablet (4 mg total) by mouth every 8 (eight) hours as needed (nausea, vomiting). 12 tablet 8/9/2022  Kaushik Patton MD        Follow-up Information     Follow up With Specialties Details Why Contact Info    Renata Holt MD Gastroenterology, Internal Medicine  GI, or GI doctor of your choice, 1 week [address]  [address]  [place] [address]  [phone]      [facility]   1 week [address]  [place] [address]  [phone]      Dialysis this week per normal schedule               Kaushik Patton MD  08/09/22 7417

## 2022-08-09 NOTE — ED NOTES
Patient is restless in bed,she will not keep mouth closed for temperature. She keeps screaming. Patient reports that she does not want tylenol for pain.

## 2022-08-09 NOTE — ED NOTES
Upon discharge, patient is AAOx4, no cardiac or respiratory complications. Follow up care and  Medications have been reviewed with patient and has been instructed to return to the ER if needed. Patient verbalized understanding. PATRIZIA VILLA.    We attempted to take patient to the parking lot in her wheel chair. She stood up upon entering the waiting room and walked to the parking lot.

## 2022-08-10 ENCOUNTER — TELEPHONE (OUTPATIENT)
Dept: GASTROENTEROLOGY | Facility: CLINIC | Age: 32
End: 2022-08-10
Payer: MEDICAID

## 2022-08-10 NOTE — TELEPHONE ENCOUNTER
Call placed to Ms. Lee in regards to message received. No answer, and unable to leave message due to mailbox being full.     **Pt needs to call [facility] to make a 1 week f/u per her discharge paperwork. At this time we are not accepting any new pts with medicaid.

## 2022-08-10 NOTE — TELEPHONE ENCOUNTER
----- Message from Lauren Bright sent at 8/10/2022 10:35 AM CDT -----  Contact: Makayla Howard  Type: Patient Call Back         Who called: Tanya Howard         What is the request in detail: gall stone removal about a week ago and states she has problems with her stomach; went to ER and was referred to be seen by Dr. Holt; please advise          Best call back number: [phone] - Tanya Howard         Additional Information: E-MEDICAID/[place] NP; a refrral will be sent shortly        Thank You

## 2022-08-11 ENCOUNTER — PATIENT OUTREACH (OUTPATIENT)
Dept: EMERGENCY MEDICINE | Facility: HOSPITAL | Age: 32
End: 2022-08-11
Payer: MEDICAID

## 2022-08-23 ENCOUNTER — HOSPITAL ENCOUNTER (INPATIENT)
Facility: HOSPITAL | Age: 32
LOS: 3 days | Discharge: HOME | DRG: 291 | End: 2022-08-26
Attending: EMERGENCY MEDICINE | Admitting: HOSPITALIST
Payer: MEDICAID

## 2022-08-23 DIAGNOSIS — R07.9 CHEST PAIN: ICD-10-CM

## 2022-08-23 DIAGNOSIS — D64.9 ANEMIA: ICD-10-CM

## 2022-08-23 DIAGNOSIS — D63.1 ANEMIA DUE TO CHRONIC KIDNEY DISEASE, UNSPECIFIED CKD STAGE: ICD-10-CM

## 2022-08-23 DIAGNOSIS — D64.9 SYMPTOMATIC ANEMIA: Primary | ICD-10-CM

## 2022-08-23 DIAGNOSIS — N18.9 ANEMIA DUE TO CHRONIC KIDNEY DISEASE, UNSPECIFIED CKD STAGE: ICD-10-CM

## 2022-08-23 DIAGNOSIS — R07.9 CHEST PAIN, UNSPECIFIED TYPE: ICD-10-CM

## 2022-08-23 LAB
ABO + RH BLD: NORMAL
ALBUMIN SERPL BCP-MCNC: 2.7 G/DL (ref 3.5–5.2)
ALP SERPL-CCNC: 109 U/L (ref 55–135)
ALT SERPL W/O P-5'-P-CCNC: 46 U/L (ref 10–44)
ANION GAP SERPL CALC-SCNC: 9 MMOL/L (ref 8–16)
ANISOCYTOSIS BLD QL SMEAR: ABNORMAL
AST SERPL-CCNC: 78 U/L (ref 10–40)
BASOPHILS # BLD AUTO: 0.01 K/UL (ref 0–0.2)
BASOPHILS NFR BLD: 0.1 % (ref 0–1.9)
BILIRUB SERPL-MCNC: 1.4 MG/DL (ref 0.1–1)
BLD GP AB SCN CELLS X3 SERPL QL: NORMAL
BLD PROD TYP BPU: NORMAL
BLOOD UNIT EXPIRATION DATE: NORMAL
BLOOD UNIT TYPE CODE: 6200
BLOOD UNIT TYPE: NORMAL
BNP SERPL-MCNC: >4500 PG/ML (ref 0–99)
BUN SERPL-MCNC: 39 MG/DL (ref 6–20)
CALCIUM SERPL-MCNC: 7.4 MG/DL (ref 8.7–10.5)
CHLORIDE SERPL-SCNC: 97 MMOL/L (ref 95–110)
CO2 SERPL-SCNC: 28 MMOL/L (ref 23–29)
CODING SYSTEM: NORMAL
CREAT SERPL-MCNC: 3.9 MG/DL (ref 0.5–1.4)
DACRYOCYTES BLD QL SMEAR: ABNORMAL
DIFFERENTIAL METHOD: ABNORMAL
DISPENSE STATUS: NORMAL
EOSINOPHIL # BLD AUTO: 0.1 K/UL (ref 0–0.5)
EOSINOPHIL NFR BLD: 0.5 % (ref 0–8)
ERYTHROCYTE [DISTWIDTH] IN BLOOD BY AUTOMATED COUNT: 15.7 % (ref 11.5–14.5)
EST. GFR  (NO RACE VARIABLE): 14.9 ML/MIN/1.73 M^2
FERRITIN SERPL-MCNC: ABNORMAL NG/ML (ref 20–300)
GLUCOSE SERPL-MCNC: 357 MG/DL (ref 70–110)
HCT VFR BLD AUTO: 13.7 % (ref 37–48.5)
HGB BLD-MCNC: 4.7 G/DL (ref 12–16)
HYPOCHROMIA BLD QL SMEAR: ABNORMAL
IMM GRANULOCYTES # BLD AUTO: 0.08 K/UL (ref 0–0.04)
IMM GRANULOCYTES NFR BLD AUTO: 0.8 % (ref 0–0.5)
INR PPP: 1.1
IRON SERPL-MCNC: 76 UG/DL (ref 30–160)
LYMPHOCYTES # BLD AUTO: 1.1 K/UL (ref 1–4.8)
LYMPHOCYTES NFR BLD: 11 % (ref 18–48)
MCH RBC QN AUTO: 28.8 PG (ref 27–31)
MCHC RBC AUTO-ENTMCNC: 34.3 G/DL (ref 32–36)
MCV RBC AUTO: 84 FL (ref 82–98)
MONOCYTES # BLD AUTO: 0.9 K/UL (ref 0.3–1)
MONOCYTES NFR BLD: 8.8 % (ref 4–15)
NEUTROPHILS # BLD AUTO: 7.9 K/UL (ref 1.8–7.7)
NEUTROPHILS NFR BLD: 78.8 % (ref 38–73)
NRBC BLD-RTO: 0 /100 WBC
NUM UNITS TRANS PACKED RBC: NORMAL
PLATELET # BLD AUTO: 90 K/UL (ref 150–450)
PLATELET BLD QL SMEAR: ABNORMAL
PMV BLD AUTO: 11.3 FL (ref 9.2–12.9)
POLYCHROMASIA BLD QL SMEAR: ABNORMAL
POTASSIUM SERPL-SCNC: 4.3 MMOL/L (ref 3.5–5.1)
PROT SERPL-MCNC: 5.7 G/DL (ref 6–8.4)
PROTHROMBIN TIME: 13.4 SEC (ref 11.4–13.7)
RBC # BLD AUTO: 1.63 M/UL (ref 4–5.4)
SARS-COV-2 RDRP RESP QL NAA+PROBE: NEGATIVE
SATURATED IRON: 40 % (ref 20–50)
SODIUM SERPL-SCNC: 134 MMOL/L (ref 136–145)
TOTAL IRON BINDING CAPACITY: 192 UG/DL (ref 250–450)
TRANSFERRIN SERPL-MCNC: 137 MG/DL (ref 200–375)
TROPONIN I SERPL DL<=0.01 NG/ML-MCNC: 0.04 NG/ML
WBC # BLD AUTO: 9.99 K/UL (ref 3.9–12.7)

## 2022-08-23 PROCEDURE — C9113 INJ PANTOPRAZOLE SODIUM, VIA: HCPCS | Performed by: HOSPITALIST

## 2022-08-23 PROCEDURE — 93005 ELECTROCARDIOGRAM TRACING: CPT | Performed by: SPECIALIST

## 2022-08-23 PROCEDURE — 86850 RBC ANTIBODY SCREEN: CPT | Performed by: EMERGENCY MEDICINE

## 2022-08-23 PROCEDURE — 93010 ELECTROCARDIOGRAM REPORT: CPT | Mod: ,,, | Performed by: SPECIALIST

## 2022-08-23 PROCEDURE — 84466 ASSAY OF TRANSFERRIN: CPT | Performed by: NURSE PRACTITIONER

## 2022-08-23 PROCEDURE — 99285 EMERGENCY DEPT VISIT HI MDM: CPT | Mod: 25

## 2022-08-23 PROCEDURE — 96375 TX/PRO/DX INJ NEW DRUG ADDON: CPT

## 2022-08-23 PROCEDURE — 21400001 HC TELEMETRY ROOM

## 2022-08-23 PROCEDURE — P9016 RBC LEUKOCYTES REDUCED: HCPCS | Performed by: EMERGENCY MEDICINE

## 2022-08-23 PROCEDURE — 63600175 PHARM REV CODE 636 W HCPCS: Performed by: EMERGENCY MEDICINE

## 2022-08-23 PROCEDURE — 85025 COMPLETE CBC W/AUTO DIFF WBC: CPT | Performed by: NURSE PRACTITIONER

## 2022-08-23 PROCEDURE — 36415 COLL VENOUS BLD VENIPUNCTURE: CPT | Performed by: NURSE PRACTITIONER

## 2022-08-23 PROCEDURE — 86920 COMPATIBILITY TEST SPIN: CPT | Performed by: HOSPITALIST

## 2022-08-23 PROCEDURE — 63600175 PHARM REV CODE 636 W HCPCS: Performed by: FAMILY MEDICINE

## 2022-08-23 PROCEDURE — 63600175 PHARM REV CODE 636 W HCPCS: Performed by: NURSE PRACTITIONER

## 2022-08-23 PROCEDURE — P9016 RBC LEUKOCYTES REDUCED: HCPCS | Performed by: INTERNAL MEDICINE

## 2022-08-23 PROCEDURE — 96374 THER/PROPH/DIAG INJ IV PUSH: CPT

## 2022-08-23 PROCEDURE — 25000003 PHARM REV CODE 250: Performed by: NURSE PRACTITIONER

## 2022-08-23 PROCEDURE — 36430 TRANSFUSION BLD/BLD COMPNT: CPT

## 2022-08-23 PROCEDURE — 86920 COMPATIBILITY TEST SPIN: CPT | Performed by: EMERGENCY MEDICINE

## 2022-08-23 PROCEDURE — 63600175 PHARM REV CODE 636 W HCPCS: Performed by: HOSPITALIST

## 2022-08-23 PROCEDURE — 25000003 PHARM REV CODE 250: Performed by: HOSPITALIST

## 2022-08-23 PROCEDURE — 83880 ASSAY OF NATRIURETIC PEPTIDE: CPT | Performed by: NURSE PRACTITIONER

## 2022-08-23 PROCEDURE — 93010 EKG 12-LEAD: ICD-10-PCS | Mod: ,,, | Performed by: SPECIALIST

## 2022-08-23 PROCEDURE — U0002 COVID-19 LAB TEST NON-CDC: HCPCS | Performed by: EMERGENCY MEDICINE

## 2022-08-23 PROCEDURE — 82728 ASSAY OF FERRITIN: CPT | Performed by: NURSE PRACTITIONER

## 2022-08-23 PROCEDURE — 84484 ASSAY OF TROPONIN QUANT: CPT | Performed by: NURSE PRACTITIONER

## 2022-08-23 PROCEDURE — 90935 HEMODIALYSIS ONE EVALUATION: CPT

## 2022-08-23 PROCEDURE — 80053 COMPREHEN METABOLIC PANEL: CPT | Performed by: NURSE PRACTITIONER

## 2022-08-23 PROCEDURE — 86920 COMPATIBILITY TEST SPIN: CPT | Performed by: INTERNAL MEDICINE

## 2022-08-23 PROCEDURE — 85610 PROTHROMBIN TIME: CPT | Performed by: NURSE PRACTITIONER

## 2022-08-23 RX ORDER — MAGNESIUM SULFATE HEPTAHYDRATE 40 MG/ML
4 INJECTION, SOLUTION INTRAVENOUS
Status: DISCONTINUED | OUTPATIENT
Start: 2022-08-23 | End: 2022-08-26 | Stop reason: HOSPADM

## 2022-08-23 RX ORDER — GLUCAGON 1 MG
1 KIT INJECTION
Status: DISCONTINUED | OUTPATIENT
Start: 2022-08-23 | End: 2022-08-26 | Stop reason: HOSPADM

## 2022-08-23 RX ORDER — FLUOXETINE HYDROCHLORIDE 20 MG/1
20 CAPSULE ORAL DAILY
Status: DISCONTINUED | OUTPATIENT
Start: 2022-08-24 | End: 2022-08-26 | Stop reason: HOSPADM

## 2022-08-23 RX ORDER — BISACODYL 5 MG
10 TABLET, DELAYED RELEASE (ENTERIC COATED) ORAL DAILY PRN
Status: DISCONTINUED | OUTPATIENT
Start: 2022-08-23 | End: 2022-08-26 | Stop reason: HOSPADM

## 2022-08-23 RX ORDER — IBUPROFEN 200 MG
16 TABLET ORAL
Status: DISCONTINUED | OUTPATIENT
Start: 2022-08-23 | End: 2022-08-26 | Stop reason: HOSPADM

## 2022-08-23 RX ORDER — TAMSULOSIN HYDROCHLORIDE 0.4 MG/1
0.8 CAPSULE ORAL DAILY
Status: ON HOLD | COMMUNITY
Start: 2022-05-26 | End: 2022-08-26 | Stop reason: HOSPADM

## 2022-08-23 RX ORDER — INSULIN ASPART 100 [IU]/ML
0-5 INJECTION, SOLUTION INTRAVENOUS; SUBCUTANEOUS
Status: DISCONTINUED | OUTPATIENT
Start: 2022-08-23 | End: 2022-08-26 | Stop reason: HOSPADM

## 2022-08-23 RX ORDER — HYDRALAZINE HYDROCHLORIDE 20 MG/ML
10 INJECTION INTRAMUSCULAR; INTRAVENOUS EVERY 4 HOURS PRN
Status: DISCONTINUED | OUTPATIENT
Start: 2022-08-23 | End: 2022-08-26 | Stop reason: HOSPADM

## 2022-08-23 RX ORDER — POLYETHYLENE GLYCOL 3350 17 G/17G
17 POWDER, FOR SOLUTION ORAL DAILY
Status: DISCONTINUED | OUTPATIENT
Start: 2022-08-24 | End: 2022-08-26 | Stop reason: HOSPADM

## 2022-08-23 RX ORDER — ARIPIPRAZOLE 5 MG/1
5 TABLET ORAL DAILY
Status: DISCONTINUED | OUTPATIENT
Start: 2022-08-24 | End: 2022-08-26 | Stop reason: HOSPADM

## 2022-08-23 RX ORDER — POTASSIUM CHLORIDE 20 MEQ/1
20 TABLET, EXTENDED RELEASE ORAL
Status: DISCONTINUED | OUTPATIENT
Start: 2022-08-23 | End: 2022-08-26 | Stop reason: HOSPADM

## 2022-08-23 RX ORDER — MORPHINE SULFATE 4 MG/ML
4 INJECTION, SOLUTION INTRAMUSCULAR; INTRAVENOUS EVERY 4 HOURS PRN
Status: DISCONTINUED | OUTPATIENT
Start: 2022-08-23 | End: 2022-08-24

## 2022-08-23 RX ORDER — SODIUM BICARBONATE 650 MG/1
1300 TABLET ORAL 2 TIMES DAILY
Status: ON HOLD | COMMUNITY
Start: 2022-03-11 | End: 2022-08-26 | Stop reason: HOSPADM

## 2022-08-23 RX ORDER — CLONIDINE 0.2 MG/24H
1 PATCH, EXTENDED RELEASE TRANSDERMAL
Status: DISCONTINUED | OUTPATIENT
Start: 2022-08-24 | End: 2022-08-26 | Stop reason: HOSPADM

## 2022-08-23 RX ORDER — AMLODIPINE BESYLATE 5 MG/1
10 TABLET ORAL DAILY
Status: DISCONTINUED | OUTPATIENT
Start: 2022-08-24 | End: 2022-08-26 | Stop reason: HOSPADM

## 2022-08-23 RX ORDER — CARVEDILOL 25 MG/1
25 TABLET ORAL 2 TIMES DAILY
Status: DISCONTINUED | OUTPATIENT
Start: 2022-08-23 | End: 2022-08-26 | Stop reason: HOSPADM

## 2022-08-23 RX ORDER — SODIUM BICARBONATE 650 MG/1
1300 TABLET ORAL DAILY
Status: DISCONTINUED | OUTPATIENT
Start: 2022-08-24 | End: 2022-08-23

## 2022-08-23 RX ORDER — POTASSIUM CHLORIDE 7.45 MG/ML
20 INJECTION INTRAVENOUS
Status: DISCONTINUED | OUTPATIENT
Start: 2022-08-23 | End: 2022-08-26 | Stop reason: HOSPADM

## 2022-08-23 RX ORDER — LANOLIN ALCOHOL/MO/W.PET/CERES
800 CREAM (GRAM) TOPICAL
Status: DISCONTINUED | OUTPATIENT
Start: 2022-08-23 | End: 2022-08-26 | Stop reason: HOSPADM

## 2022-08-23 RX ORDER — POTASSIUM CHLORIDE 20 MEQ/1
40 TABLET, EXTENDED RELEASE ORAL
Status: DISCONTINUED | OUTPATIENT
Start: 2022-08-23 | End: 2022-08-26 | Stop reason: HOSPADM

## 2022-08-23 RX ORDER — AMOXICILLIN 250 MG
1 CAPSULE ORAL 2 TIMES DAILY PRN
Status: DISCONTINUED | OUTPATIENT
Start: 2022-08-23 | End: 2022-08-26 | Stop reason: HOSPADM

## 2022-08-23 RX ORDER — HYDROCODONE BITARTRATE AND ACETAMINOPHEN 500; 5 MG/1; MG/1
TABLET ORAL
Status: DISCONTINUED | OUTPATIENT
Start: 2022-08-23 | End: 2022-08-23

## 2022-08-23 RX ORDER — NAPROXEN SODIUM 220 MG/1
324 TABLET, FILM COATED ORAL ONCE
Status: DISCONTINUED | OUTPATIENT
Start: 2022-08-23 | End: 2022-08-23

## 2022-08-23 RX ORDER — HYDRALAZINE HYDROCHLORIDE 25 MG/1
100 TABLET, FILM COATED ORAL EVERY 8 HOURS
Status: DISCONTINUED | OUTPATIENT
Start: 2022-08-23 | End: 2022-08-26 | Stop reason: HOSPADM

## 2022-08-23 RX ORDER — POTASSIUM CHLORIDE 7.45 MG/ML
40 INJECTION INTRAVENOUS
Status: DISCONTINUED | OUTPATIENT
Start: 2022-08-23 | End: 2022-08-26 | Stop reason: HOSPADM

## 2022-08-23 RX ORDER — FAMOTIDINE 20 MG/1
20 TABLET, FILM COATED ORAL DAILY
Status: DISCONTINUED | OUTPATIENT
Start: 2022-08-24 | End: 2022-08-25

## 2022-08-23 RX ORDER — ARIPIPRAZOLE 5 MG/1
5 TABLET ORAL DAILY
Status: ON HOLD | COMMUNITY
Start: 2022-05-25 | End: 2022-10-18 | Stop reason: HOSPADM

## 2022-08-23 RX ORDER — NALOXONE HCL 0.4 MG/ML
0.02 VIAL (ML) INJECTION
Status: DISCONTINUED | OUTPATIENT
Start: 2022-08-23 | End: 2022-08-26 | Stop reason: HOSPADM

## 2022-08-23 RX ORDER — TALC
6 POWDER (GRAM) TOPICAL NIGHTLY PRN
Status: DISCONTINUED | OUTPATIENT
Start: 2022-08-23 | End: 2022-08-26 | Stop reason: HOSPADM

## 2022-08-23 RX ORDER — LEVETIRACETAM 500 MG/1
500 TABLET ORAL 2 TIMES DAILY
Status: DISCONTINUED | OUTPATIENT
Start: 2022-08-23 | End: 2022-08-26 | Stop reason: HOSPADM

## 2022-08-23 RX ORDER — MAGNESIUM SULFATE 1 G/100ML
1 INJECTION INTRAVENOUS
Status: DISCONTINUED | OUTPATIENT
Start: 2022-08-23 | End: 2022-08-26 | Stop reason: HOSPADM

## 2022-08-23 RX ORDER — DESVENLAFAXINE SUCCINATE 50 MG/1
50 TABLET, EXTENDED RELEASE ORAL DAILY
Status: DISCONTINUED | OUTPATIENT
Start: 2022-08-24 | End: 2022-08-26 | Stop reason: HOSPADM

## 2022-08-23 RX ORDER — HYDROCODONE BITARTRATE AND ACETAMINOPHEN 5; 325 MG/1; MG/1
1 TABLET ORAL EVERY 4 HOURS PRN
Status: DISCONTINUED | OUTPATIENT
Start: 2022-08-23 | End: 2022-08-26

## 2022-08-23 RX ORDER — FUROSEMIDE 10 MG/ML
80 INJECTION INTRAMUSCULAR; INTRAVENOUS ONCE
Status: DISCONTINUED | OUTPATIENT
Start: 2022-08-23 | End: 2022-08-26 | Stop reason: HOSPADM

## 2022-08-23 RX ORDER — HYDRALAZINE HYDROCHLORIDE 20 MG/ML
5 INJECTION INTRAMUSCULAR; INTRAVENOUS EVERY 4 HOURS PRN
Status: DISCONTINUED | OUTPATIENT
Start: 2022-08-23 | End: 2022-08-24

## 2022-08-23 RX ORDER — HYDRALAZINE HYDROCHLORIDE 20 MG/ML
5 INJECTION INTRAMUSCULAR; INTRAVENOUS
Status: COMPLETED | OUTPATIENT
Start: 2022-08-23 | End: 2022-08-23

## 2022-08-23 RX ORDER — MORPHINE SULFATE 4 MG/ML
4 INJECTION, SOLUTION INTRAMUSCULAR; INTRAVENOUS
Status: COMPLETED | OUTPATIENT
Start: 2022-08-23 | End: 2022-08-23

## 2022-08-23 RX ORDER — TAMSULOSIN HYDROCHLORIDE 0.4 MG/1
0.8 CAPSULE ORAL DAILY
Status: DISCONTINUED | OUTPATIENT
Start: 2022-08-24 | End: 2022-08-26 | Stop reason: HOSPADM

## 2022-08-23 RX ORDER — SIMETHICONE 80 MG
1 TABLET,CHEWABLE ORAL 4 TIMES DAILY PRN
Status: DISCONTINUED | OUTPATIENT
Start: 2022-08-23 | End: 2022-08-26 | Stop reason: HOSPADM

## 2022-08-23 RX ORDER — MAGNESIUM SULFATE HEPTAHYDRATE 40 MG/ML
2 INJECTION, SOLUTION INTRAVENOUS
Status: DISCONTINUED | OUTPATIENT
Start: 2022-08-23 | End: 2022-08-26 | Stop reason: HOSPADM

## 2022-08-23 RX ORDER — ISOSORBIDE MONONITRATE 60 MG/1
120 TABLET, EXTENDED RELEASE ORAL DAILY
Status: DISCONTINUED | OUTPATIENT
Start: 2022-08-24 | End: 2022-08-26 | Stop reason: HOSPADM

## 2022-08-23 RX ORDER — ONDANSETRON 2 MG/ML
4 INJECTION INTRAMUSCULAR; INTRAVENOUS EVERY 6 HOURS PRN
Status: DISCONTINUED | OUTPATIENT
Start: 2022-08-23 | End: 2022-08-26 | Stop reason: HOSPADM

## 2022-08-23 RX ORDER — ACETAMINOPHEN 325 MG/1
650 TABLET ORAL EVERY 8 HOURS PRN
Status: DISCONTINUED | OUTPATIENT
Start: 2022-08-23 | End: 2022-08-26 | Stop reason: HOSPADM

## 2022-08-23 RX ORDER — SODIUM CHLORIDE 0.9 % (FLUSH) 0.9 %
10 SYRINGE (ML) INJECTION
Status: DISCONTINUED | OUTPATIENT
Start: 2022-08-23 | End: 2022-08-26 | Stop reason: HOSPADM

## 2022-08-23 RX ORDER — POLYETHYLENE GLYCOL 3350 17 G/17G
17 POWDER, FOR SOLUTION ORAL 2 TIMES DAILY PRN
Status: DISCONTINUED | OUTPATIENT
Start: 2022-08-23 | End: 2022-08-26 | Stop reason: HOSPADM

## 2022-08-23 RX ORDER — HYDROMORPHONE HYDROCHLORIDE 1 MG/ML
1 INJECTION, SOLUTION INTRAMUSCULAR; INTRAVENOUS; SUBCUTANEOUS
Status: COMPLETED | OUTPATIENT
Start: 2022-08-23 | End: 2022-08-23

## 2022-08-23 RX ORDER — DESVENLAFAXINE SUCCINATE 50 MG/1
50 TABLET, EXTENDED RELEASE ORAL EVERY OTHER DAY
Status: ON HOLD | COMMUNITY
Start: 2022-05-26 | End: 2022-10-18 | Stop reason: HOSPADM

## 2022-08-23 RX ORDER — IBUPROFEN 200 MG
24 TABLET ORAL
Status: DISCONTINUED | OUTPATIENT
Start: 2022-08-23 | End: 2022-08-26 | Stop reason: HOSPADM

## 2022-08-23 RX ORDER — PANTOPRAZOLE SODIUM 40 MG/1
40 TABLET, DELAYED RELEASE ORAL DAILY
Status: DISCONTINUED | OUTPATIENT
Start: 2022-08-24 | End: 2022-08-23

## 2022-08-23 RX ORDER — METOCLOPRAMIDE 5 MG/1
5 TABLET ORAL
COMMUNITY
Start: 2022-05-25 | End: 2022-08-23

## 2022-08-23 RX ADMIN — HYDRALAZINE HYDROCHLORIDE 5 MG: 20 INJECTION INTRAMUSCULAR; INTRAVENOUS at 10:08

## 2022-08-23 RX ADMIN — HYDRALAZINE HYDROCHLORIDE 5 MG: 20 INJECTION, SOLUTION INTRAMUSCULAR; INTRAVENOUS at 03:08

## 2022-08-23 RX ADMIN — ONDANSETRON 4 MG: 2 INJECTION INTRAMUSCULAR; INTRAVENOUS at 09:08

## 2022-08-23 RX ADMIN — PANTOPRAZOLE SODIUM 8 MG/HR: 40 INJECTION, POWDER, FOR SOLUTION INTRAVENOUS at 10:08

## 2022-08-23 RX ADMIN — MORPHINE SULFATE 4 MG: 4 INJECTION, SOLUTION INTRAMUSCULAR; INTRAVENOUS at 12:08

## 2022-08-23 RX ADMIN — HYDROMORPHONE HYDROCHLORIDE 1 MG: 1 INJECTION, SOLUTION INTRAMUSCULAR; INTRAVENOUS; SUBCUTANEOUS at 01:08

## 2022-08-23 RX ADMIN — INSULIN DETEMIR 5 UNITS: 100 INJECTION, SOLUTION SUBCUTANEOUS at 10:08

## 2022-08-23 NOTE — ASSESSMENT & PLAN NOTE
Patient's FSGs are uncontrolled due to hyperglycemia on current medication regimen.  Last A1c reviewed-   Lab Results   Component Value Date    HGBA1C 6.0 (H) 07/22/2022     Most recent fingerstick glucose reviewed- No results for input(s): POCTGLUCOSE in the last 24 hours.  Current correctional scale  Low  Maintain anti-hyperglycemic dose as follows-   Antihyperglycemics (From admission, onward)            Start     Stop Route Frequency Ordered    08/23/22 2100  insulin detemir U-100 pen 5 Units         -- SubQ Nightly 08/23/22 1831    08/23/22 1830  insulin aspart U-100 pen 0-5 Units         -- SubQ Before meals & nightly PRN 08/23/22 1830      Accu checks   Hold Oral hypoglycemics while patient is in the hospital.

## 2022-08-23 NOTE — ASSESSMENT & PLAN NOTE
H/H 4.7/13.7. Trend  Type and screen  Patient to receive 3 units of PRBC's. 1 unit in ED and other will be administered in the ED.  GI consulted for possible GI bleed. Patient will be scoped in the am.  Protonix IV

## 2022-08-23 NOTE — ASSESSMENT & PLAN NOTE
BUN 39, Creatinine 3.9, GFR 20. Patient did not receive dialysis on today due to having chest pain. EMS was called to transport patient to ED  Nephrology consulted. Patient to have dialysis on today.   Trend labs  Avoid nephrotoxins. Renally dose medications.

## 2022-08-23 NOTE — ED NOTES
Pt stepmother, Tanya, called for an update on pt. Pt related it was okay to give her an update. She also related she thinks the patient would benefit from a social work consult.     Tanya can be reached at [phone]

## 2022-08-23 NOTE — ASSESSMENT & PLAN NOTE
Patient hypertensive on arrival to ED. Hydralazine IV given  Lasix 80mg IV x 1  Monitor labs and vitals.  Continue home medications and adjust according to patient condition.  Hydralazine IV prn sbp >160.  Patient to receive dialysis on today.

## 2022-08-23 NOTE — ED PROVIDER NOTES
Encounter Date: 2022       History     Chief Complaint   Patient presents with    Chest Pain     Midline chest pain, pt was suppose to go to dialysis today but was in too much pain.  100mcg  Fentantyl, Zofran 4mg, ASA 325mg, 1 Spray Nitro given by EMS PTA     Patient here via EMS reportedly noted to dialysis today with severe crampy pain chest abdomen a unable form dialysis and contacted center to the emergency department patient does have a history of end-stage renal disease with dialysis congestive heart failure diabetes hyperkalemia uncontrolled hypertension she has a history of sickle cell trait and anemia she cannot recall when she last received blood arrival emergency department patient in moderate distress secondary to pain she had received aspirin and 1 spray of sublingual nitroglycerin per EMS without improvement patient's oxygen saturation 98% and she is markedly hypertensive        Review of patient's allergies indicates:   Allergen Reactions    Penicillins Hives     Past Medical History:   Diagnosis Date    CKD (chronic kidney disease), stage IV 2022    Diabetes mellitus due to underlying condition with unspecified complications 2022    Gastroparesis 2022    Heart failure with preserved ejection fraction 2022    EF 55% on 3/22    History of gastroesophageal reflux (GERD)     History of supraventricular tachycardia     Hyperkalemia 2022    Hypertensive emergency 2022    Sickle cell trait 2022    Type 2 diabetes mellitus      Past Surgical History:   Procedure Laterality Date     SECTION      x 3    ESOPHAGOGASTRODUODENOSCOPY N/A 10/18/2019    Procedure: ESOPHAGOGASTRODUODENOSCOPY (EGD);  Surgeon: Gianluca Mendez MD;  Location: [facility];  Service: Endoscopy;  Laterality: N/A;    LAPAROSCOPIC CHOLECYSTECTOMY N/A 2022    Procedure: CHOLECYSTECTOMY, LAPAROSCOPIC;  Surgeon: Grey Perez MD;  [facility];  Service: General;   Laterality: N/A;    PLACEMENT OF DUAL-LUMEN VASCULAR CATHETER Left 7/12/2022    Procedure: INSERTION-CATHETER-JOSEPH;  Surgeon: Dionte Gan MD;  Location: [facility] OR;  Service: General;  Laterality: Left;    PLACEMENT OF DUAL-LUMEN VASCULAR CATHETER Right 7/26/2022    Procedure: INSERTION-CATHETER-Hemosplit;  Surgeon: Dionte Gan MD;  Location: [facility] OR;  Service: General;  Laterality: Right;     Family History   Problem Relation Age of Onset    Diabetes Mother     Diabetes Father      Social History     Tobacco Use    Smoking status: Never Smoker    Smokeless tobacco: Never Used   Substance Use Topics    Alcohol use: No    Drug use: No     Review of Systems   Constitutional: Negative for chills and fever.   HENT: Negative for congestion.    Respiratory: Positive for cough and shortness of breath.    Cardiovascular: Positive for chest pain.   Gastrointestinal: Positive for abdominal pain. Negative for blood in stool, diarrhea, nausea and vomiting.   Genitourinary: Negative for dysuria.   Musculoskeletal: Negative for back pain.       Physical Exam     Initial Vitals   BP Pulse Resp Temp SpO2   08/23/22 1144 08/23/22 1143 08/23/22 1143 08/23/22 1400 08/23/22 1143   (!) 184/118 85 20 (!) 95.4 °F (35.2 °C) 99 %      MAP       --                Physical Exam    Constitutional: She appears well-developed and well-nourished. She appears distressed.   HENT:   Head: Normocephalic and atraumatic.   Right Ear: External ear normal.   Left Ear: External ear normal.   Mouth/Throat: Oropharynx is clear and moist.   Eyes: Conjunctivae and EOM are normal. Pupils are equal, round, and reactive to light.   Neck: Neck supple.   Normal range of motion.  Cardiovascular: Normal rate, regular rhythm, normal heart sounds and intact distal pulses.   Pulmonary/Chest: No respiratory distress. She has rales.   Abdominal: Abdomen is soft. Bowel sounds are normal. There is no abdominal tenderness. There is no rebound and no  guarding.   Musculoskeletal:         General: No edema. Normal range of motion.      Cervical back: Normal range of motion and neck supple.     Neurological: She is alert and oriented to person, place, and time. GCS score is 15. GCS eye subscore is 4. GCS verbal subscore is 5. GCS motor subscore is 6.   Skin: Skin is warm and dry. Capillary refill takes less than 2 seconds. No rash noted.   Psychiatric: She has a normal mood and affect. Her behavior is normal.         ED Course   Procedures  Labs Reviewed   CBC W/ AUTO DIFFERENTIAL - Abnormal; Notable for the following components:       Result Value    RBC 1.63 (*)     Hemoglobin 4.7 (*)     Hematocrit 13.7 (*)     RDW 15.7 (*)     Platelets 90 (*)     Immature Granulocytes 0.8 (*)     Gran # (ANC) 7.9 (*)     Immature Grans (Abs) 0.08 (*)     Gran % 78.8 (*)     Lymph % 11.0 (*)     Platelet Estimate Decreased (*)     All other components within normal limits    Narrative:     HGB, HCT critical result(s) repeated. Called and verbal readback   obtained from Jarrett Bland RN/ED by DIONTE 08/23/2022 12:14   COMPREHENSIVE METABOLIC PANEL - Abnormal; Notable for the following components:    Sodium 134 (*)     Glucose 357 (*)     BUN 39 (*)     Creatinine 3.9 (*)     Calcium 7.4 (*)     Total Protein 5.7 (*)     Albumin 2.7 (*)     Total Bilirubin 1.4 (*)     AST 78 (*)     ALT 46 (*)     eGFR 14.9 (*)     All other components within normal limits   B-TYPE NATRIURETIC PEPTIDE - Abnormal; Notable for the following components:    BNP >4,500 (*)     All other components within normal limits   TROPONIN I - Abnormal; Notable for the following components:    Troponin I 0.041 (*)     All other components within normal limits   PROTIME-INR   SARS-COV-2 RNA AMPLIFICATION, QUAL   TYPE & SCREEN   PREPARE RBC SOFT        ECG Results          EKG 12-lead (In process)  Result time 08/23/22 12:20:31    In process by Interface, Lab In [facility] (08/23/22 12:20:31)                 Narrative:     Test Reason : R07.9,    Vent. Rate : 084 BPM     Atrial Rate : 084 BPM     P-R Int : 162 ms          QRS Dur : 082 ms      QT Int : 416 ms       P-R-T Axes : 073 -16 037 degrees     QTc Int : 491 ms    Normal sinus rhythm  Possible Anterior infarct (cited on or before 13-DEC-2020)  Abnormal ECG  When compared with ECG of 09-AUG-2022 13:26,  Nonspecific T wave abnormality, worse in Lateral leads    Referred By:             Confirmed By:                             Imaging Results          X-Ray Chest AP Portable (Final result)  Result time 08/23/22 12:29:19    Final result by Daniel Edmondson MD (08/23/22 12:29:19)                 Narrative:    HISTORY: Chest pain    FINDINGS: Portable chest radiograph at 1213 hours compared to 08/03/2022 shows right internal jugular central venous catheter unchanged in position, with stable moderately enlarged cardiac silhouette and normal pulmonary vascularity.    Lungs are normally expanded, with no consolidation, large pleural effusion, evidence of pulmonary edema, or pneumothorax. There are scattered lower lung reticular opacities suggesting subsegmental atelectasis. No acute fractures.    IMPRESSION: Stable moderate cardiomegaly, with scattered lower lung subsegmental atelectasis.    Electronically signed by:  Daniel Edmondson MD  8/23/2022 12:29 PM CDT Workstation: [id number]                               Medications   0.9%  NaCl infusion (for blood administration) (has no administration in time range)   morphine injection 4 mg (4 mg Intravenous Given 8/23/22 1246)   HYDROmorphone injection 1 mg (1 mg Intravenous Given 8/23/22 1313)   hydrALAZINE injection 5 mg (5 mg Intravenous Given 8/23/22 1537)     Medical Decision Making:   ED Management:  Laboratory evaluation reviewed patient found to be severely anemic I have ordered 2 units of packed red blood cells I have begun transfusion emergency department med discussed patient's findings with  he states he will arrange for  emergent dialysis today he reported that he would give additional unit of blood in dialysis patient received morphine and Dilaudid with improvement in her pain she is not resting comfortably she has had persistent hypertension and I have bridged her with hydralazine pending dialysis            Attending Attestation:         Attending Critical Care:   Critical Care Times:   Direct Patient Care (initial evaluation, reassessments, and time considering the case)................................................................15 minutes.   Additional History from reviewing old medical records or taking additional history from the family, EMS, PCP, etc.......................5 minutes.   Ordering, Reviewing, and Interpreting Diagnostic Studies...............................................................................................................10 minutes.   Documentation..................................................................................................................................................................................6 minutes.   Consultation with other Physicians. .................................................................................................................................................7 minutes.   ==============================================================  · Total Critical Care Time - exclusive of procedural time: 43 minutes.  ==============================================================                   Clinical Impression:   Final diagnoses:  [R07.9] Chest pain, unspecified type (Primary)  [N18.9, D63.1] Anemia due to chronic kidney disease, unspecified CKD stage          ED Disposition Condition    Observation               William Morales MD  08/23/22 [id number]

## 2022-08-23 NOTE — HPI
Ms. Howard is 33y/o female with PMHx of End-Stage Renal disease (dialysis T-T-S), Congestive heart failure (EFC 55% 3/22), Diabetes 2, hyperkalemia,Hypertension, GERD, Sickle cell trait, anemia, and Gastroparesis. Patient reported to ED via EMS transportation with c/o chest pain and abdominal pain. Patient was at dialysis when symptoms began and EMS was called out to transport patient. She did not receive dialysis on today.  Patient was in distress and given ASA 325mg, Nitro spray sublingual, Fentanyl 100mcg IV, Zofran 4mg per EMS report. She is currently hypertensive spb increased to 200's. She is on 02 @ 2L oxygen saturation is 98%.     In the ED her initial work up her h/h 4.7/13.7,BNP 4,500, Troponin 0.041, Creatinine 3.9, BUN, 39, GFR 20, Transferrin 137, TIBC 192, Iron 76. Chest x-ray scattered lower lung subsegmental atelectasis.Patient was Typed and screened for transfusion. Nephrology was consulted for dialysis.  She received one unit of PRBC in ED and will receive the other 2 in dialysis. GI consulted and will scope patient in the am. Patient will be admitted to hospital medicine for further workup and medical management.

## 2022-08-23 NOTE — ASSESSMENT & PLAN NOTE
Troponin 0.041, BNP 4,500  Patient given nitro spray, and ASA 324mg and IV Fentanyl per EMS report.  Continuous cardiac monitoring  Trend labs  Nitroglycerin prn chest pain   EKG prn chest pain  If needed will consult Cardiology.

## 2022-08-23 NOTE — SUBJECTIVE & OBJECTIVE
Past Medical History:   Diagnosis Date    CKD (chronic kidney disease), stage IV 2022    Diabetes mellitus due to underlying condition with unspecified complications 2022    Gastroparesis 2022    Heart failure with preserved ejection fraction 2022    EF 55% on 3/22    History of gastroesophageal reflux (GERD)     History of supraventricular tachycardia     Hyperkalemia 2022    Hypertensive emergency 2022    Sickle cell trait 2022    Type 2 diabetes mellitus        Past Surgical History:   Procedure Laterality Date     SECTION      x 3    ESOPHAGOGASTRODUODENOSCOPY N/A 10/18/2019    Procedure: ESOPHAGOGASTRODUODENOSCOPY (EGD);  Surgeon: Gianluca Mendez MD;  Location[facility];  Service: Endoscopy;  Laterality: N/A;    LAPAROSCOPIC CHOLECYSTECTOMY N/A 2022    Procedure: CHOLECYSTECTOMY, LAPAROSCOPIC;  Surgeon: Grey Perez MD;  Location: [facility] OR;  Service: General;  Laterality: N/A;    PLACEMENT OF DUAL-LUMEN VASCULAR CATHETER Left 2022    Procedure: INSERTION-CATHETER-JOSEPH;  Surgeon: Dionte Gan MD;  Location: [facility] OR;  Service: General;  Laterality: Left;    PLACEMENT OF DUAL-LUMEN VASCULAR CATHETER Right 2022    Procedure: INSERTION-CATHETER-Hemosplit;  Surgeon: Dionte Gan MD;  Location: [facility] OR;  Service: General;  Laterality: Right;       Review of patient's allergies indicates:   Allergen Reactions    Penicillins Hives       No current facility-administered medications on file prior to encounter.     Current Outpatient Medications on File Prior to Encounter   Medication Sig    amLODIPine (NORVASC) 10 MG tablet Take 1 tablet (10 mg total) by mouth once daily.    [START ON 2022] apixaban (ELIQUIS) 5 mg Tab Take 1 tablet (5 mg total) by mouth 2 (two) times daily. For second month on.    carvediloL (COREG) 25 MG tablet Take 1 tablet (25 mg total) by mouth 2 (two) times daily.    desvenlafaxine succinate (PRISTIQ) 50 MG Tb24 Take 50 mg by  mouth.    famotidine (PEPCID) 20 MG tablet Take 1 tablet (20 mg total) by mouth once daily.    FLUoxetine 20 MG capsule Take 1 capsule (20 mg total) by mouth once daily.    insulin aspart U-100 (NOVOLOG) 100 unit/mL (3 mL) InPn pen Inject 1-10 Units into the skin before meals and at bedtime as needed (Hyperglycemia).    isosorbide mononitrate (IMDUR) 60 MG 24 hr tablet Take 2 tablets (120 mg total) by mouth once daily.    levETIRAcetam (KEPPRA) 500 MG Tab Take 1 tablet (500 mg total) by mouth 2 (two) times daily.    omeprazole (PRILOSEC) 20 MG capsule Take 2 capsules (40 mg total) by mouth once daily. for 10 days    ondansetron (ZOFRAN-ODT) 4 MG TbDL Take 1 tablet (4 mg total) by mouth every 8 (eight) hours as needed (nausea, vomiting).    sodium bicarbonate 650 MG tablet Take 1,300 mg by mouth 2 (two) times daily.    tamsulosin (FLOMAX) 0.4 mg Cap Take 0.8 mg by mouth once daily.    ARIPiprazole (ABILIFY) 5 MG Tab Take 5 mg by mouth once daily.    bisacodyL (DULCOLAX) 5 mg EC tablet Take 2 tablets (10 mg total) by mouth daily as needed for Constipation. (Patient not taking: Reported on 8/23/2022)    cloNIDine 0.2 mg/24 hr td ptwk (CATAPRES) 0.2 mg/24 hr Place 1 patch onto the skin every 7 days. (Patient not taking: Reported on 8/23/2022)    furosemide (LASIX) 40 MG tablet Take 1 tablet (40 mg total) by mouth 2 (two) times daily. (Patient not taking: Reported on 8/23/2022)    hydrALAZINE (APRESOLINE) 100 MG tablet Take 1 tablet (100 mg total) by mouth every 8 (eight) hours. (Patient not taking: Reported on 8/23/2022)    LANTUS U-100 INSULIN 100 unit/mL injection Inject 5 Units into the skin once daily.    oxyCODONE-acetaminophen (PERCOCET) 5-325 mg per tablet Take 1 tablet by mouth every 6 (six) hours as needed for Pain. (Patient not taking: Reported on 8/23/2022)    polyethylene glycol (GLYCOLAX) 17 gram/dose powder Take 17 g by mouth once daily. (Patient not taking: Reported on 8/23/2022)    [DISCONTINUED]  apixaban (ELIQUIS) 5 mg Tab TAKE 2 TABLETS BY MOUTH TWICE DAILY FOR 7 DAYS THEN 1 TABLET TWICE DAILY THERE AFTER    [DISCONTINUED] atenoloL (TENORMIN) 50 MG tablet Take 1 tablet (50 mg total) by mouth every other day.    [DISCONTINUED] metoclopramide HCl (REGLAN) 5 MG tablet Take 5 mg by mouth 5 (five) times daily.    [DISCONTINUED] pantoprazole (PROTONIX) 40 MG tablet Take 1 tablet (40 mg total) by mouth once daily.    [DISCONTINUED] torsemide (DEMADEX) 20 MG Tab Take 1 tablet (20 mg total) by mouth 2 (two) times a day. (Patient taking differently: Take 20 mg by mouth once daily.)     Family History       Problem Relation (Age of Onset)    Diabetes Mother, Father          Tobacco Use    Smoking status: Never Smoker    Smokeless tobacco: Never Used   Substance and Sexual Activity    Alcohol use: No    Drug use: No    Sexual activity: Not Currently     Partners: Male     Birth control/protection: I.U.D.     Review of Systems   Eyes:  Negative for visual disturbance.   Respiratory:  Positive for chest tightness and shortness of breath.    Cardiovascular:  Positive for chest pain. Negative for palpitations.   Gastrointestinal:  Positive for abdominal pain, blood in stool and nausea.   Genitourinary:  Negative for flank pain.   Musculoskeletal:  Positive for myalgias.   Neurological:  Positive for weakness.   Objective:     Vital Signs (Most Recent):  Temp: 96.1 °F (35.6 °C) (08/23/22 1745)  Pulse: 81 (08/23/22 1745)  Resp: 16 (08/23/22 1745)  BP: (!) 165/110 (08/23/22 1745)  SpO2: 97 % (08/23/22 1745)   Vital Signs (24h Range):  Temp:  [95.2 °F (35.1 °C)-96.1 °F (35.6 °C)] 96.1 °F (35.6 °C)  Pulse:  [77-85] 81  Resp:  [14-22] 16  SpO2:  [96 %-99 %] 97 %  BP: (162-190)/(102-119) 165/110        Body mass index is 30.18 kg/m².    Physical Exam  Constitutional:       General: She is in acute distress.      Appearance: She is ill-appearing.   HENT:      Head: Normocephalic.   Neck:      Comments: Right side of neck dialysis  access.   Cardiovascular:      Rate and Rhythm: Normal rate and regular rhythm.      Heart sounds: Normal heart sounds.   Pulmonary:      Effort: Tachypnea present.      Breath sounds: Rales present.      Comments: 02@2L N/C  Abdominal:      General: Bowel sounds are normal.      Palpations: Abdomen is soft.      Tenderness: There is abdominal tenderness in the epigastric area.   Musculoskeletal:      Cervical back: Neck supple.   Skin:     General: Skin is warm.      Comments: Left neck has dialysis access.            Significant Labs: All pertinent labs within the past 24 hours have been reviewed.    Significant Imaging: I have reviewed all pertinent imaging results/findings within the past 24 hours.

## 2022-08-23 NOTE — H&P
[facility] - Emergency Dept  Hospital Medicine  History & Physical    Patient Name: Tabby Howard  MRN: [id number]  Patient Class: IP- Inpatient  Admission Date: 8/23/2022  Attending Physician: Amalia Jefferson MD   Primary Care Provider: [facility]         Patient information was obtained from patient, EMS personnel, past medical records and ER records.     Subjective:     Principal Problem:Anemia    Chief Complaint:   Chief Complaint   Patient presents with    Chest Pain     Midline chest pain, pt was suppose to go to dialysis today but was in too much pain.  100mcg  Fentantyl, Zofran 4mg, ASA 325mg, 1 Spray Nitro given by EMS PTA        HPI: Ms. Howard is 33y/o female with PMHx of End-Stage Renal disease (dialysis T-T-S), Congestive heart failure (EFC 55% 3/22), Diabetes 2, hyperkalemia,Hypertension, GERD, Sickle cell trait, anemia, and Gastroparesis. Patient reported to ED via EMS transportation with c/o chest pain and abdominal pain. Patient was at dialysis when symptoms began and EMS was called out to transport patient. She did not receive dialysis on today.  Patient was in distress and given ASA 325mg, Nitro spray sublingual, Fentanyl 100mcg IV, Zofran 4mg per EMS report. She is currently hypertensive spb increased to 200's. She is on 02 @ 2L oxygen saturation is 98%.     In the ED her initial work up her h/h 4.7/13.7,BNP 4,500, Troponin 0.041, Creatinine 3.9, BUN, 39, GFR 20, Transferrin 137, TIBC 192, Iron 76. Chest x-ray scattered lower lung subsegmental atelectasis.Patient was Typed and screened for transfusion. Nephrology was consulted for dialysis.  She received one unit of PRBC in ED and will receive the other 2 in dialysis. GI consulted and will scope patient in the am. Patient will be admitted to hospital medicine for further workup and medical management.                       Past Medical History:   Diagnosis Date    CKD (chronic kidney  disease), stage IV 2022    Diabetes mellitus due to underlying condition with unspecified complications 2022    Gastroparesis 2022    Heart failure with preserved ejection fraction 2022    EF 55% on 3/22    History of gastroesophageal reflux (GERD)     History of supraventricular tachycardia     Hyperkalemia 2022    Hypertensive emergency 2022    Sickle cell trait 2022    Type 2 diabetes mellitus        Past Surgical History:   Procedure Laterality Date     SECTION      x 3    ESOPHAGOGASTRODUODENOSCOPY N/A 10/18/2019    Procedure: ESOPHAGOGASTRODUODENOSCOPY (EGD);  Surgeon: Gianluca Mendez MD;  Location[facility];  Service: Endoscopy;  Laterality: N/A;    LAPAROSCOPIC CHOLECYSTECTOMY N/A 2022    Procedure: CHOLECYSTECTOMY, LAPAROSCOPIC;  Surgeon: Grey Perez MD;  Location: [facility] OR;  Service: General;  Laterality: N/A;    PLACEMENT OF DUAL-LUMEN VASCULAR CATHETER Left 2022    Procedure: INSERTION-CATHETER-JOSEPH;  Surgeon: Dionte Gan MD;  Location: [facility] OR;  Service: General;  Laterality: Left;    PLACEMENT OF DUAL-LUMEN VASCULAR CATHETER Right 2022    Procedure: INSERTION-CATHETER-Hemosplit;  Surgeon: Dionte Gan MD;  Location: [facility] OR;  Service: General;  Laterality: Right;       Review of patient's allergies indicates:   Allergen Reactions    Penicillins Hives       No current facility-administered medications on file prior to encounter.     Current Outpatient Medications on File Prior to Encounter   Medication Sig    amLODIPine (NORVASC) 10 MG tablet Take 1 tablet (10 mg total) by mouth once daily.    [START ON 2022] apixaban (ELIQUIS) 5 mg Tab Take 1 tablet (5 mg total) by mouth 2 (two) times daily. For second month on.    carvediloL (COREG) 25 MG tablet Take 1 tablet (25 mg total) by mouth 2 (two) times daily.    desvenlafaxine succinate (PRISTIQ) 50 MG Tb24 Take 50 mg by mouth.    famotidine (PEPCID) 20 MG tablet  Take 1 tablet (20 mg total) by mouth once daily.    FLUoxetine 20 MG capsule Take 1 capsule (20 mg total) by mouth once daily.    insulin aspart U-100 (NOVOLOG) 100 unit/mL (3 mL) InPn pen Inject 1-10 Units into the skin before meals and at bedtime as needed (Hyperglycemia).    isosorbide mononitrate (IMDUR) 60 MG 24 hr tablet Take 2 tablets (120 mg total) by mouth once daily.    levETIRAcetam (KEPPRA) 500 MG Tab Take 1 tablet (500 mg total) by mouth 2 (two) times daily.    omeprazole (PRILOSEC) 20 MG capsule Take 2 capsules (40 mg total) by mouth once daily. for 10 days    ondansetron (ZOFRAN-ODT) 4 MG TbDL Take 1 tablet (4 mg total) by mouth every 8 (eight) hours as needed (nausea, vomiting).    sodium bicarbonate 650 MG tablet Take 1,300 mg by mouth 2 (two) times daily.    tamsulosin (FLOMAX) 0.4 mg Cap Take 0.8 mg by mouth once daily.    ARIPiprazole (ABILIFY) 5 MG Tab Take 5 mg by mouth once daily.    bisacodyL (DULCOLAX) 5 mg EC tablet Take 2 tablets (10 mg total) by mouth daily as needed for Constipation. (Patient not taking: Reported on 8/23/2022)    cloNIDine 0.2 mg/24 hr td ptwk (CATAPRES) 0.2 mg/24 hr Place 1 patch onto the skin every 7 days. (Patient not taking: Reported on 8/23/2022)    furosemide (LASIX) 40 MG tablet Take 1 tablet (40 mg total) by mouth 2 (two) times daily. (Patient not taking: Reported on 8/23/2022)    hydrALAZINE (APRESOLINE) 100 MG tablet Take 1 tablet (100 mg total) by mouth every 8 (eight) hours. (Patient not taking: Reported on 8/23/2022)    LANTUS U-100 INSULIN 100 unit/mL injection Inject 5 Units into the skin once daily.    oxyCODONE-acetaminophen (PERCOCET) 5-325 mg per tablet Take 1 tablet by mouth every 6 (six) hours as needed for Pain. (Patient not taking: Reported on 8/23/2022)    polyethylene glycol (GLYCOLAX) 17 gram/dose powder Take 17 g by mouth once daily. (Patient not taking: Reported on 8/23/2022)    [DISCONTINUED] apixaban (ELIQUIS) 5 mg Tab TAKE  2 TABLETS BY MOUTH TWICE DAILY FOR 7 DAYS THEN 1 TABLET TWICE DAILY THERE AFTER    [DISCONTINUED] atenoloL (TENORMIN) 50 MG tablet Take 1 tablet (50 mg total) by mouth every other day.    [DISCONTINUED] metoclopramide HCl (REGLAN) 5 MG tablet Take 5 mg by mouth 5 (five) times daily.    [DISCONTINUED] pantoprazole (PROTONIX) 40 MG tablet Take 1 tablet (40 mg total) by mouth once daily.    [DISCONTINUED] torsemide (DEMADEX) 20 MG Tab Take 1 tablet (20 mg total) by mouth 2 (two) times a day. (Patient taking differently: Take 20 mg by mouth once daily.)     Family History       Problem Relation (Age of Onset)    Diabetes Mother, Father          Tobacco Use    Smoking status: Never Smoker    Smokeless tobacco: Never Used   Substance and Sexual Activity    Alcohol use: No    Drug use: No    Sexual activity: Not Currently     Partners: Male     Birth control/protection: I.U.D.     Review of Systems   Eyes:  Negative for visual disturbance.   Respiratory:  Positive for chest tightness and shortness of breath.    Cardiovascular:  Positive for chest pain. Negative for palpitations.   Gastrointestinal:  Positive for abdominal pain, blood in stool and nausea.   Genitourinary:  Negative for flank pain.   Musculoskeletal:  Positive for myalgias.   Neurological:  Positive for weakness.   Objective:     Vital Signs (Most Recent):  Temp: 96.1 °F (35.6 °C) (08/23/22 1745)  Pulse: 81 (08/23/22 1745)  Resp: 16 (08/23/22 1745)  BP: (!) 165/110 (08/23/22 1745)  SpO2: 97 % (08/23/22 1745)   Vital Signs (24h Range):  Temp:  [95.2 °F (35.1 °C)-96.1 °F (35.6 °C)] 96.1 °F (35.6 °C)  Pulse:  [77-85] 81  Resp:  [14-22] 16  SpO2:  [96 %-99 %] 97 %  BP: (162-190)/(102-119) 165/110        Body mass index is 30.18 kg/m².    Physical Exam  Constitutional:       General: She is in acute distress.      Appearance: She is ill-appearing.   HENT:      Head: Normocephalic.   Neck:      Comments: Right side of neck dialysis access.    Cardiovascular:      Rate and Rhythm: Normal rate and regular rhythm.      Heart sounds: Normal heart sounds.   Pulmonary:      Effort: Tachypnea present.      Breath sounds: Rales present.      Comments: 02@2L N/C  Abdominal:      General: Bowel sounds are normal.      Palpations: Abdomen is soft.      Tenderness: There is abdominal tenderness in the epigastric area.   Musculoskeletal:      Cervical back: Neck supple.   Skin:     General: Skin is warm.      Comments: Left neck has dialysis access.            Significant Labs: All pertinent labs within the past 24 hours have been reviewed.    Significant Imaging: I have reviewed all pertinent imaging results/findings within the past 24 hours.    Assessment/Plan:     * Anemia  H/H 4.7/13.7. Trend  Type and screen  Patient to receive 3 units of PRBC's. 1 unit in ED and other will be administered in the ED.  GI consulted for possible GI bleed. Patient will be scoped in the am.  Protonix IV         Chest pain  Troponin 0.041, BNP 4,500  Patient given nitro spray, and ASA 324mg and IV Fentanyl per EMS report.  Continuous cardiac monitoring  Trend labs  Nitroglycerin prn chest pain   EKG prn chest pain  If needed will consult Cardiology.       HTN (hypertension)  Patient hypertensive on arrival to ED. Hydralazine IV given  Lasix 80mg IV x 1  Monitor labs and vitals.  Continue home medications and adjust according to patient condition.  Hydralazine IV prn sbp >160.  Patient to receive dialysis on today.      ESRD (end stage renal disease)  BUN 39, Creatinine 3.9, GFR 20. Patient did not receive dialysis on today due to having chest pain. EMS was called to transport patient to ED  Nephrology consulted. Patient to have dialysis on today.   Trend labs  Avoid nephrotoxins. Renally dose medications.   Hold Apixaban    Seizure  Seizure precautions  Continue home medications.       Type II diabetes mellitus  Patient's FSGs are uncontrolled due to hyperglycemia on current  medication regimen.  Last A1c reviewed-   Lab Results   Component Value Date    HGBA1C 6.0 (H) 07/22/2022     Most recent fingerstick glucose reviewed- No results for input(s): POCTGLUCOSE in the last 24 hours.  Current correctional scale  Low  Maintain anti-hyperglycemic dose as follows-   Antihyperglycemics (From admission, onward)            Start     Stop Route Frequency Ordered    08/23/22 2100  insulin detemir U-100 pen 5 Units         -- SubQ Nightly 08/23/22 1831 08/23/22 1830  insulin aspart U-100 pen 0-5 Units         -- SubQ Before meals & nightly PRN 08/23/22 1830      Accu checks   Hold Oral hypoglycemics while patient is in the hospital.      VTE Risk Mitigation (From admission, onward)         Ordered     IP VTE HIGH RISK PATIENT  Once         08/23/22 1830     Place sequential compression device  Until discontinued         08/23/22 1830                   Marion Tamayo NP  Department of Hospital Medicine   [facility] - Emergency Dept

## 2022-08-24 LAB
ANION GAP SERPL CALC-SCNC: 8 MMOL/L (ref 8–16)
BASOPHILS # BLD AUTO: 0.04 K/UL (ref 0–0.2)
BASOPHILS NFR BLD: 0.3 % (ref 0–1.9)
BUN SERPL-MCNC: 28 MG/DL (ref 6–20)
CALCIUM SERPL-MCNC: 7.8 MG/DL (ref 8.7–10.5)
CHLORIDE SERPL-SCNC: 100 MMOL/L (ref 95–110)
CO2 SERPL-SCNC: 27 MMOL/L (ref 23–29)
CREAT SERPL-MCNC: 3 MG/DL (ref 0.5–1.4)
DIFFERENTIAL METHOD: ABNORMAL
EOSINOPHIL # BLD AUTO: 0.1 K/UL (ref 0–0.5)
EOSINOPHIL NFR BLD: 1.2 % (ref 0–8)
ERYTHROCYTE [DISTWIDTH] IN BLOOD BY AUTOMATED COUNT: 14.6 % (ref 11.5–14.5)
EST. GFR  (NO RACE VARIABLE): 20.4 ML/MIN/1.73 M^2
ESTIMATED AVG GLUCOSE: 131 MG/DL (ref 68–131)
GLUCOSE SERPL-MCNC: 62 MG/DL (ref 70–110)
GLUCOSE SERPL-MCNC: 68 MG/DL (ref 70–110)
GLUCOSE SERPL-MCNC: 69 MG/DL (ref 70–110)
GLUCOSE SERPL-MCNC: 70 MG/DL (ref 70–110)
GLUCOSE SERPL-MCNC: 81 MG/DL (ref 70–110)
GLUCOSE SERPL-MCNC: 84 MG/DL (ref 70–110)
HBA1C MFR BLD: 6.2 % (ref 4.5–6.2)
HCT VFR BLD AUTO: 26.8 % (ref 37–48.5)
HGB BLD-MCNC: 9.4 G/DL (ref 12–16)
IMM GRANULOCYTES # BLD AUTO: 0.06 K/UL (ref 0–0.04)
IMM GRANULOCYTES NFR BLD AUTO: 0.5 % (ref 0–0.5)
LYMPHOCYTES # BLD AUTO: 1.1 K/UL (ref 1–4.8)
LYMPHOCYTES NFR BLD: 9 % (ref 18–48)
MAGNESIUM SERPL-MCNC: 2.1 MG/DL (ref 1.6–2.6)
MCH RBC QN AUTO: 29.6 PG (ref 27–31)
MCHC RBC AUTO-ENTMCNC: 35.1 G/DL (ref 32–36)
MCV RBC AUTO: 84 FL (ref 82–98)
MONOCYTES # BLD AUTO: 0.9 K/UL (ref 0.3–1)
MONOCYTES NFR BLD: 7.9 % (ref 4–15)
NEUTROPHILS # BLD AUTO: 9.4 K/UL (ref 1.8–7.7)
NEUTROPHILS NFR BLD: 81.1 % (ref 38–73)
NRBC BLD-RTO: 0 /100 WBC
PLATELET # BLD AUTO: 99 K/UL (ref 150–450)
PMV BLD AUTO: 9.4 FL (ref 9.2–12.9)
POTASSIUM SERPL-SCNC: 3.9 MMOL/L (ref 3.5–5.1)
RBC # BLD AUTO: 3.18 M/UL (ref 4–5.4)
SODIUM SERPL-SCNC: 135 MMOL/L (ref 136–145)
TROPONIN I SERPL DL<=0.01 NG/ML-MCNC: 0.1 NG/ML
WBC # BLD AUTO: 11.61 K/UL (ref 3.9–12.7)

## 2022-08-24 PROCEDURE — 83735 ASSAY OF MAGNESIUM: CPT | Performed by: NURSE PRACTITIONER

## 2022-08-24 PROCEDURE — 36415 COLL VENOUS BLD VENIPUNCTURE: CPT | Performed by: HOSPITALIST

## 2022-08-24 PROCEDURE — 99900031 HC PATIENT EDUCATION (STAT)

## 2022-08-24 PROCEDURE — 82947 ASSAY GLUCOSE BLOOD QUANT: CPT | Performed by: HOSPITALIST

## 2022-08-24 PROCEDURE — 94761 N-INVAS EAR/PLS OXIMETRY MLT: CPT

## 2022-08-24 PROCEDURE — 27000221 HC OXYGEN, UP TO 24 HOURS

## 2022-08-24 PROCEDURE — 80048 BASIC METABOLIC PNL TOTAL CA: CPT | Performed by: NURSE PRACTITIONER

## 2022-08-24 PROCEDURE — 96374 THER/PROPH/DIAG INJ IV PUSH: CPT | Performed by: INTERNAL MEDICINE

## 2022-08-24 PROCEDURE — 21400001 HC TELEMETRY ROOM

## 2022-08-24 PROCEDURE — 25000003 PHARM REV CODE 250: Performed by: INTERNAL MEDICINE

## 2022-08-24 PROCEDURE — 85025 COMPLETE CBC W/AUTO DIFF WBC: CPT | Performed by: NURSE PRACTITIONER

## 2022-08-24 PROCEDURE — 93005 ELECTROCARDIOGRAM TRACING: CPT | Performed by: SPECIALIST

## 2022-08-24 PROCEDURE — 43235 EGD DIAGNOSTIC BRUSH WASH: CPT | Performed by: INTERNAL MEDICINE

## 2022-08-24 PROCEDURE — 93010 ELECTROCARDIOGRAM REPORT: CPT | Mod: ,,, | Performed by: SPECIALIST

## 2022-08-24 PROCEDURE — 36415 COLL VENOUS BLD VENIPUNCTURE: CPT | Performed by: NURSE PRACTITIONER

## 2022-08-24 PROCEDURE — 25000003 PHARM REV CODE 250: Performed by: HOSPITALIST

## 2022-08-24 PROCEDURE — 83036 HEMOGLOBIN GLYCOSYLATED A1C: CPT | Performed by: NURSE PRACTITIONER

## 2022-08-24 PROCEDURE — 99900035 HC TECH TIME PER 15 MIN (STAT)

## 2022-08-24 PROCEDURE — 63600175 PHARM REV CODE 636 W HCPCS: Performed by: NURSE PRACTITIONER

## 2022-08-24 PROCEDURE — 63600175 PHARM REV CODE 636 W HCPCS: Performed by: INTERNAL MEDICINE

## 2022-08-24 PROCEDURE — 94799 UNLISTED PULMONARY SVC/PX: CPT

## 2022-08-24 PROCEDURE — 93010 EKG 12-LEAD: ICD-10-PCS | Mod: ,,, | Performed by: SPECIALIST

## 2022-08-24 PROCEDURE — 84484 ASSAY OF TROPONIN QUANT: CPT | Performed by: FAMILY MEDICINE

## 2022-08-24 PROCEDURE — 25000003 PHARM REV CODE 250: Performed by: NURSE PRACTITIONER

## 2022-08-24 PROCEDURE — 63600175 PHARM REV CODE 636 W HCPCS: Performed by: HOSPITALIST

## 2022-08-24 RX ORDER — LIDOCAINE HYDROCHLORIDE 20 MG/ML
15 SOLUTION OROPHARYNGEAL ONCE
Status: DISCONTINUED | OUTPATIENT
Start: 2022-08-24 | End: 2022-08-24

## 2022-08-24 RX ORDER — MORPHINE SULFATE 4 MG/ML
4 INJECTION, SOLUTION INTRAMUSCULAR; INTRAVENOUS EVERY 6 HOURS PRN
Status: DISCONTINUED | OUTPATIENT
Start: 2022-08-24 | End: 2022-08-26

## 2022-08-24 RX ORDER — LIDOCAINE HYDROCHLORIDE 20 MG/ML
15 SOLUTION OROPHARYNGEAL ONCE
Status: COMPLETED | OUTPATIENT
Start: 2022-08-24 | End: 2022-08-24

## 2022-08-24 RX ORDER — MAG HYDROX/ALUMINUM HYD/SIMETH 200-200-20
30 SUSPENSION, ORAL (FINAL DOSE FORM) ORAL ONCE
Status: DISCONTINUED | OUTPATIENT
Start: 2022-08-24 | End: 2022-08-24

## 2022-08-24 RX ORDER — DIPHENHYDRAMINE HYDROCHLORIDE 50 MG/ML
INJECTION INTRAMUSCULAR; INTRAVENOUS
Status: COMPLETED | OUTPATIENT
Start: 2022-08-24 | End: 2022-08-24

## 2022-08-24 RX ORDER — PANTOPRAZOLE SODIUM 40 MG/1
40 TABLET, DELAYED RELEASE ORAL 2 TIMES DAILY
Status: DISCONTINUED | OUTPATIENT
Start: 2022-08-24 | End: 2022-08-26 | Stop reason: HOSPADM

## 2022-08-24 RX ORDER — POLYETHYLENE GLYCOL 3350 17 G/17G
340 POWDER, FOR SOLUTION ORAL ONCE
Status: COMPLETED | OUTPATIENT
Start: 2022-08-24 | End: 2022-08-24

## 2022-08-24 RX ORDER — FENTANYL CITRATE 50 UG/ML
INJECTION, SOLUTION INTRAMUSCULAR; INTRAVENOUS
Status: COMPLETED | OUTPATIENT
Start: 2022-08-24 | End: 2022-08-24

## 2022-08-24 RX ORDER — MAG HYDROX/ALUMINUM HYD/SIMETH 200-200-20
30 SUSPENSION, ORAL (FINAL DOSE FORM) ORAL ONCE
Status: COMPLETED | OUTPATIENT
Start: 2022-08-24 | End: 2022-08-24

## 2022-08-24 RX ORDER — MIDAZOLAM HYDROCHLORIDE 5 MG/ML
INJECTION INTRAMUSCULAR; INTRAVENOUS
Status: COMPLETED | OUTPATIENT
Start: 2022-08-24 | End: 2022-08-24

## 2022-08-24 RX ADMIN — FLUOXETINE 20 MG: 20 CAPSULE ORAL at 10:08

## 2022-08-24 RX ADMIN — ISOSORBIDE MONONITRATE 120 MG: 60 TABLET, EXTENDED RELEASE ORAL at 10:08

## 2022-08-24 RX ADMIN — MORPHINE SULFATE 4 MG: 4 INJECTION, SOLUTION INTRAMUSCULAR; INTRAVENOUS at 05:08

## 2022-08-24 RX ADMIN — MIDAZOLAM HYDROCHLORIDE 2 MG: 5 INJECTION, SOLUTION INTRAMUSCULAR; INTRAVENOUS at 07:08

## 2022-08-24 RX ADMIN — HYDROCODONE BITARTRATE AND ACETAMINOPHEN 1 TABLET: 5; 325 TABLET ORAL at 05:08

## 2022-08-24 RX ADMIN — LEVETIRACETAM 500 MG: 500 TABLET, FILM COATED ORAL at 08:08

## 2022-08-24 RX ADMIN — PANTOPRAZOLE SODIUM 40 MG: 40 TABLET, DELAYED RELEASE ORAL at 12:08

## 2022-08-24 RX ADMIN — CARVEDILOL 25 MG: 25 TABLET, FILM COATED ORAL at 10:08

## 2022-08-24 RX ADMIN — FENTANYL CITRATE 50 MCG: 50 INJECTION INTRAMUSCULAR; INTRAVENOUS at 07:08

## 2022-08-24 RX ADMIN — HYDROCODONE BITARTRATE AND ACETAMINOPHEN 1 TABLET: 5; 325 TABLET ORAL at 12:08

## 2022-08-24 RX ADMIN — TAMSULOSIN HYDROCHLORIDE 0.8 MG: 0.4 CAPSULE ORAL at 10:08

## 2022-08-24 RX ADMIN — DIPHENHYDRAMINE HYDROCHLORIDE 50 MG: 50 INJECTION, SOLUTION INTRAMUSCULAR; INTRAVENOUS at 07:08

## 2022-08-24 RX ADMIN — PANTOPRAZOLE SODIUM 40 MG: 40 TABLET, DELAYED RELEASE ORAL at 04:08

## 2022-08-24 RX ADMIN — POLYETHYLENE GLYCOL 3350 17 G: 17 POWDER, FOR SOLUTION ORAL at 10:08

## 2022-08-24 RX ADMIN — HYDRALAZINE HYDROCHLORIDE 100 MG: 25 TABLET, FILM COATED ORAL at 08:08

## 2022-08-24 RX ADMIN — LIDOCAINE HYDROCHLORIDE 15 ML: 20 SOLUTION ORAL; TOPICAL at 02:08

## 2022-08-24 RX ADMIN — ARIPIPRAZOLE 5 MG: 5 TABLET ORAL at 10:08

## 2022-08-24 RX ADMIN — MORPHINE SULFATE 4 MG: 4 INJECTION, SOLUTION INTRAMUSCULAR; INTRAVENOUS at 03:08

## 2022-08-24 RX ADMIN — ONDANSETRON 4 MG: 2 INJECTION INTRAMUSCULAR; INTRAVENOUS at 10:08

## 2022-08-24 RX ADMIN — LEVETIRACETAM 500 MG: 500 TABLET, FILM COATED ORAL at 10:08

## 2022-08-24 RX ADMIN — HYDRALAZINE HYDROCHLORIDE 100 MG: 25 TABLET, FILM COATED ORAL at 10:08

## 2022-08-24 RX ADMIN — POLYETHYLENE GLYCOL 3350 340 G: 17 POWDER, FOR SOLUTION ORAL at 10:08

## 2022-08-24 RX ADMIN — ALUMINUM HYDROXIDE, MAGNESIUM HYDROXIDE, AND SIMETHICONE 30 ML: 200; 200; 20 SUSPENSION ORAL at 02:08

## 2022-08-24 RX ADMIN — CLONIDINE 1 PATCH: 0.2 PATCH TRANSDERMAL at 10:08

## 2022-08-24 RX ADMIN — AMLODIPINE BESYLATE 10 MG: 5 TABLET ORAL at 10:08

## 2022-08-24 RX ADMIN — FAMOTIDINE 20 MG: 20 TABLET ORAL at 10:08

## 2022-08-24 RX ADMIN — CARVEDILOL 25 MG: 25 TABLET, FILM COATED ORAL at 08:08

## 2022-08-24 RX ADMIN — HYDRALAZINE HYDROCHLORIDE 100 MG: 25 TABLET, FILM COATED ORAL at 03:08

## 2022-08-24 RX ADMIN — MORPHINE SULFATE 4 MG: 4 INJECTION, SOLUTION INTRAMUSCULAR; INTRAVENOUS at 09:08

## 2022-08-24 NOTE — NURSING
PATIENT COMPLAINING OF BURNING CHEST PAIN. EKG DONE. NOTIFIED DR. BOTELLO WILL RETURN CALL.    Rhofade Counseling: Rhofade is a topical medication which can decrease superficial blood flow where applied. Side effects are uncommon and include stinging, redness and allergic reactions.

## 2022-08-24 NOTE — NURSING
PATIENT REQUESTING SOMETHING FOR PAIN. I EXPLAINED TO TODD THAT SHE HAD A PAIN PILL AND MORPHINE  ORDERED. PATIENT REFUSED THE PAIN PILL AND MORPHINE REQUESTED THAT THE MD GIVE HER DILAUDID. NOTIFIED DR. BOTELLO REGARDING PATIENT REQUESTS AND DR KINNEY DID NOT ORDER THE DILAUDID FOR THE PATIENT.

## 2022-08-24 NOTE — PLAN OF CARE
Problem: Adult Inpatient Plan of Care  Goal: Plan of Care Review  Outcome: Ongoing, Progressing  Goal: Patient-Specific Goal (Individualized)  Outcome: Ongoing, Progressing  Goal: Absence of Hospital-Acquired Illness or Injury  Outcome: Ongoing, Progressing  Goal: Optimal Comfort and Wellbeing  Outcome: Ongoing, Progressing  Goal: Readiness for Transition of Care  Outcome: Ongoing, Progressing     Problem: Device-Related Complication Risk (Hemodialysis)  Goal: Safe, Effective Therapy Delivery  Outcome: Ongoing, Progressing     Problem: Hemodynamic Instability (Hemodialysis)  Goal: Effective Tissue Perfusion  Outcome: Ongoing, Progressing     Problem: Infection (Hemodialysis)  Goal: Absence of Infection Signs and Symptoms  Outcome: Ongoing, Progressing     Problem: Diabetes Comorbidity  Goal: Blood Glucose Level Within Targeted Range  Outcome: Ongoing, Progressing     Problem: Fluid and Electrolyte Imbalance (Acute Kidney Injury/Impairment)  Goal: Fluid and Electrolyte Balance  Outcome: Ongoing, Progressing     Problem: Oral Intake Inadequate (Acute Kidney Injury/Impairment)  Goal: Optimal Nutrition Intake  Outcome: Ongoing, Progressing     Problem: Renal Function Impairment (Acute Kidney Injury/Impairment)  Goal: Effective Renal Function  Outcome: Ongoing, Progressing     Problem: Fluid Imbalance (Pneumonia)  Goal: Fluid Balance  Outcome: Ongoing, Progressing     Problem: Infection (Pneumonia)  Goal: Resolution of Infection Signs and Symptoms  Outcome: Ongoing, Progressing     Problem: Respiratory Compromise (Pneumonia)  Goal: Effective Oxygenation and Ventilation  Outcome: Ongoing, Progressing     Problem: Infection  Goal: Absence of Infection Signs and Symptoms  Outcome: Ongoing, Progressing     Problem: Skin Injury Risk Increased  Goal: Skin Health and Integrity  Outcome: Ongoing, Progressing

## 2022-08-24 NOTE — NURSING
Patient yelling and screaming that she is having abd. Pain. Notified Dr. Og regarding something for pain. Will return call.

## 2022-08-24 NOTE — NURSING
DR. BALDWIN RETURNED CALL. NO ORDER NOTED FOR DILAUDID. PATIENT CAN ONLY HAVE THE MORPHINE 4 MG IVP AS ORDERED.

## 2022-08-24 NOTE — CONSULTS
GASTROENTEROLOGY INPATIENT CONSULT NOTE  Patient Name: Tabby Howard  Patient MRN: [id number]  Patient : 1990    Admit Date: 2022  Service date: 2022    Reason for Consult: acute / chronic anemia    PCP: [facility]    Chief Complaint   Patient presents with    Chest Pain     Midline chest pain, pt was suppose to go to dialysis today but was in too much pain.  100mcg  Fentantyl, Zofran 4mg, ASA 325mg, 1 Spray Nitro given by EMS PTA       HPI: Patient is a 32 y.o. female with PMHx lap freedom , HTN, CKD, DM, transfusion dependent chronic anemia, CHF, DVT (Eliquis), sickle cell trait, reported gastroparesis presents for evalaution of weakness and epig pain. Acute onset, intermittent, slight progression on presentation. No signs of bleeding, h/o NSAIDs. Reports compiance w/ blood thinner. Got pRBCs on admission. No prior colonoscopy.     CHART REVIEW:   CT ABd  - gastritis; hematoma in GB fossa; volume overload; Nml Liver / panc   EGD  - gastritis    Past Medical History:  Past Medical History:   Diagnosis Date    CKD (chronic kidney disease), stage IV 2022    Diabetes mellitus due to underlying condition with unspecified complications 2022    Gastroparesis 2022    Heart failure with preserved ejection fraction 2022    EF 55% on 3/22    History of gastroesophageal reflux (GERD)     History of supraventricular tachycardia     Hyperkalemia 2022    Hypertensive emergency 2022    Sickle cell trait 2022    Type 2 diabetes mellitus         Past Surgical History:  Past Surgical History:   Procedure Laterality Date     SECTION      x 3    ESOPHAGOGASTRODUODENOSCOPY N/A 10/18/2019    Procedure: ESOPHAGOGASTRODUODENOSCOPY (EGD);  Surgeon: Gianluca Mendez MD;  Location: [facility];  Service: Endoscopy;  Laterality: N/A;    LAPAROSCOPIC CHOLECYSTECTOMY N/A 2022    Procedure: CHOLECYSTECTOMY, LAPAROSCOPIC;   Surgeon: Grey Perez MD;  Location: [facility] OR;  Service: General;  Laterality: N/A;    PLACEMENT OF DUAL-LUMEN VASCULAR CATHETER Left 7/12/2022    Procedure: INSERTION-CATHETER-JOSEPH;  Surgeon: Dionte Gan MD;  Location: [facility] OR;  Service: General;  Laterality: Left;    PLACEMENT OF DUAL-LUMEN VASCULAR CATHETER Right 7/26/2022    Procedure: INSERTION-CATHETER-Hemosplit;  Surgeon: Dionte Gan MD;  Location: [facility] OR;  Service: General;  Laterality: Right;        Home Medications:  Medications Prior to Admission   Medication Sig Dispense Refill Last Dose    amLODIPine (NORVASC) 10 MG tablet Take 1 tablet (10 mg total) by mouth once daily. 30 tablet 11 8/22/2022 at Unknown time    apixaban (ELIQUIS) 5 mg Tab Take 1 tablet (5 mg total) by mouth 2 (two) times daily. For second month on. 60 tablet 2 8/22/2022 at Unknown time    carvediloL (COREG) 25 MG tablet Take 1 tablet (25 mg total) by mouth 2 (two) times daily. 60 tablet 2 8/22/2022 at Unknown time    desvenlafaxine succinate (PRISTIQ) 50 MG Tb24 Take 50 mg by mouth.       famotidine (PEPCID) 20 MG tablet Take 1 tablet (20 mg total) by mouth once daily. 30 tablet 0 8/22/2022 at Unknown time    FLUoxetine 20 MG capsule Take 1 capsule (20 mg total) by mouth once daily. 30 capsule 1 8/22/2022 at Unknown time    insulin aspart U-100 (NOVOLOG) 100 unit/mL (3 mL) InPn pen Inject 1-10 Units into the skin before meals and at bedtime as needed (Hyperglycemia). 3 mL 3 8/22/2022 at Unknown time    isosorbide mononitrate (IMDUR) 60 MG 24 hr tablet Take 2 tablets (120 mg total) by mouth once daily. 30 tablet 1 8/22/2022 at Unknown time    levETIRAcetam (KEPPRA) 500 MG Tab Take 1 tablet (500 mg total) by mouth 2 (two) times daily. 60 tablet 1 8/22/2022 at Unknown time    omeprazole (PRILOSEC) 20 MG capsule Take 2 capsules (40 mg total) by mouth once daily. for 10 days 20 capsule 0 8/22/2022 at Unknown time    ondansetron (ZOFRAN-ODT) 4 MG TbDL Take 1  tablet (4 mg total) by mouth every 8 (eight) hours as needed (nausea, vomiting). 12 tablet 0 8/22/2022 at Unknown time    sodium bicarbonate 650 MG tablet Take 1,300 mg by mouth 2 (two) times daily.       tamsulosin (FLOMAX) 0.4 mg Cap Take 0.8 mg by mouth once daily.       ARIPiprazole (ABILIFY) 5 MG Tab Take 5 mg by mouth once daily.       bisacodyL (DULCOLAX) 5 mg EC tablet Take 2 tablets (10 mg total) by mouth daily as needed for Constipation. (Patient not taking: Reported on 8/23/2022) 14 tablet 0 Not Taking at Unknown time    cloNIDine 0.2 mg/24 hr td ptwk (CATAPRES) 0.2 mg/24 hr Place 1 patch onto the skin every 7 days. (Patient not taking: Reported on 8/23/2022) 4 patch 2 Not Taking at Unknown time    furosemide (LASIX) 40 MG tablet Take 1 tablet (40 mg total) by mouth 2 (two) times daily. (Patient not taking: Reported on 8/23/2022) 60 tablet 0 Not Taking at Unknown time    hydrALAZINE (APRESOLINE) 100 MG tablet Take 1 tablet (100 mg total) by mouth every 8 (eight) hours. (Patient not taking: Reported on 8/23/2022) 90 tablet 2 Not Taking at Unknown time    LANTUS U-100 INSULIN 100 unit/mL injection Inject 5 Units into the skin once daily.       oxyCODONE-acetaminophen (PERCOCET) 5-325 mg per tablet Take 1 tablet by mouth every 6 (six) hours as needed for Pain. (Patient not taking: Reported on 8/23/2022) 20 tablet 0 Not Taking at Unknown time    polyethylene glycol (GLYCOLAX) 17 gram/dose powder Take 17 g by mouth once daily. (Patient not taking: Reported on 8/23/2022) 510 g 1 Not Taking at Unknown time       Inpatient Medications:   amLODIPine  10 mg Oral Daily    ARIPiprazole  5 mg Oral Daily    carvediloL  25 mg Oral BID    cloNIDine 0.2 mg/24 hr td ptwk  1 patch Transdermal Q7 Days    desvenlafaxine succinate  50 mg Oral Daily    famotidine  20 mg Oral Daily    FLUoxetine  20 mg Oral Daily    furosemide (LASIX) injection  80 mg Intravenous Once    hydrALAZINE  100 mg Oral Q8H     insulin detemir U-100  5 Units Subcutaneous QHS    isosorbide mononitrate  120 mg Oral Daily    levETIRAcetam  500 mg Oral BID    polyethylene glycol  17 g Oral Daily    tamsulosin  0.8 mg Oral Daily     acetaminophen, bisacodyL, calcium chloride IVPB, calcium chloride IVPB, calcium chloride IVPB, dextrose 50%, dextrose 50%, diphenhydrAMINE, glucagon (human recombinant), glucose, glucose, hydrALAZINE, hydrALAZINE, HYDROcodone-acetaminophen, insulin aspart U-100, magnesium oxide, magnesium sulfate IVPB, magnesium sulfate IVPB, magnesium sulfate IVPB, magnesium sulfate IVPB, melatonin, morphine, naloxone, ondansetron, polyethylene glycol, potassium chloride in water, potassium chloride in water, potassium chloride in water, potassium chloride in water, potassium chloride, potassium chloride, potassium chloride, potassium chloride, senna-docusate 8.6-50 mg, simethicone, sodium chloride 0.9%, sodium phosphate IVPB, sodium phosphate IVPB, sodium phosphate IVPB, sodium phosphate IVPB, sodium phosphate IVPB    Review of patient's allergies indicates:   Allergen Reactions    Penicillins Hives       Social History:   Social History     Occupational History    Not on file   Tobacco Use    Smoking status: Never Smoker    Smokeless tobacco: Never Used   Substance and Sexual Activity    Alcohol use: No    Drug use: No    Sexual activity: Not Currently     Partners: Male     Birth control/protection: I.U.D.       Family History:   Family History   Problem Relation Age of Onset    Diabetes Mother     Diabetes Father        Review of Systems:  A 10 point review of systems was performed and was normal, except as mentioned in the HPI, including constitutional, HEENT, heme, lymph, cardiovascular, respiratory, gastrointestinal, genitourinary, neurologic, endocrine, psychiatric and musculoskeletal.      OBJECTIVE:    Physical Exam:  24 Hour Vital Sign Ranges: Temp:  [95.2 °F (35.1 °C)-98.4 °F (36.9 °C)] 98.1 °F (36.7  "°C)  Pulse:  [77-87] 83  Resp:  [14-22] 18  SpO2:  [94 %-100 %] 95 %  BP: (149-190)/() 168/99  Most recent vitals: BP (!) 168/99   Pulse 83   Temp 98.1 °F (36.7 °C)   Resp 18   Ht 5' 2.01" (1.575 m)   Wt 70.7 kg (155 lb 13.8 oz)   LMP 08/01/2022 (Approximate)   SpO2 95%   Breastfeeding No   BMI 28.50 kg/m²    GEN: well-developed, well-nourished, awake and alert, non-toxic appearing adult  HEENT: PERRL, sclera anicteric, oral mucosa pink and moist without lesion  NECK: trachea midline; Good ROM  CV: regular rate and rhythm, no murmurs or gallops  RESP: clear to auscultation bilaterally, no wheezes, rhonci or rales  ABD: soft, non-tender, non-distended, normal bowel sounds  EXT: mild swelling or edema, 1+ pulses distally  SKIN: no rashes or jaundice  PSYCH: flat affect    Labs:   Recent Labs     08/23/22  1155 08/24/22  0218   WBC 9.99 11.61   MCV 84 84   PLT 90* 99*     Recent Labs     08/23/22  1155 08/24/22  0218   * 135*   K 4.3 3.9   CL 97 100   CO2 28 27   BUN 39* 28*   * 70     No results for input(s): ALB in the last 72 hours.    Invalid input(s): ALKP, SGOT, SGPT, TBIL, DBIL, TPRO  Recent Labs     08/23/22  1155   INR 1.1         Radiology Review:  X-Ray Chest AP Portable   Final Result            IMPRESSION / RECOMMENDATIONS:  32 y.o. female with PMHx lap freedom 7/'22, HTN, CKD, DM, transfusion dependent chronic anemia, CHF, DVT (Eliquis), sickle cell trait, reported gastroparesis presents for evalaution of weakness and epig pain w/ progression of anemia and questionable gastritis on imaging. Ultimately, suspect this is CKD / chronic dz / sickle cell given longstanding anemia. RIsks, benefits, alternatives discussed in detail regarding upcoming procedures and sedation and possible complications. Some of the more common endoscopic complications include but not limited to immediate or delayed perforation, bleeding, infections, pain, inadvertent injury to surrounding tissue / organs " and possible need for surgical evaluation. All questions answered and will proceed with procedure as planned.     -EGD today; Colon joana if negative    Thank you for this consult.    Micky Paredes III  8/24/2022  7:44 AM

## 2022-08-24 NOTE — CONSULTS
Nephrology Consult Note        Patient Name: Tabby Howard  MRN: [id number]    Patient Class: IP- Inpatient   Admission Date: 2022  Length of Stay: 1 days  Date of Service: 2022    Attending Physician: Amalia Jefferson MD  Primary Care Provider: [facility]    Reason for Consult: esrd/anemia/htn/shpt/abdominal pain    SUBJECTIVE:     HPI: 32F with lap freedom 2022, HTN, ESRD on HD MWF, DM, transfusion dependent chronic anemia, CHF, DVT (Eliquis), sickle cell trait, gastroparesis presents with weakness and epig pain. No signs of bleeding, h/o NSAIDs. Reports compiance w/ blood thinner. Got pRBCs on admission for Hgb 4.7. CT Abd 2022 - gastritis; hematoma in GB fossa; volume overload; Nml Liver / panc. EGD  - gastritis. Renal consulted for dialysis.     VSS. Tolerated HD well. 3 PRBC transfuse yesterday. Issues with pain control overnight. EGD today.    Past Medical History:   Diagnosis Date    CKD (chronic kidney disease), stage IV 2022    Diabetes mellitus due to underlying condition with unspecified complications 2022    Gastroparesis 2022    Heart failure with preserved ejection fraction 2022    EF 55% on 3/22    History of gastroesophageal reflux (GERD)     History of supraventricular tachycardia     Hyperkalemia 2022    Hypertensive emergency 2022    Sickle cell trait 2022    Type 2 diabetes mellitus      Past Surgical History:   Procedure Laterality Date     SECTION      x 3    ESOPHAGOGASTRODUODENOSCOPY N/A 10/18/2019    Procedure: ESOPHAGOGASTRODUODENOSCOPY (EGD);  Surgeon: Gianluca Mendez MD;  [facility];  Service: Endoscopy;  Laterality: N/A;    LAPAROSCOPIC CHOLECYSTECTOMY N/A 2022    Procedure: CHOLECYSTECTOMY, LAPAROSCOPIC;  Surgeon: Gery Perez MD;  Location: [facility];  Service: General;  Laterality: N/A;    PLACEMENT OF DUAL-LUMEN VASCULAR CATHETER Left 2022     Procedure: INSERTION-CATHETER-JOSEPH;  Surgeon: Dionte Gan MD;  Location: [facility] OR;  Service: General;  Laterality: Left;    PLACEMENT OF DUAL-LUMEN VASCULAR CATHETER Right 7/26/2022    Procedure: INSERTION-CATHETER-Hemosplit;  Surgeon: Dionte Gan MD;  Location: [facility] OR;  Service: General;  Laterality: Right;     Family History   Problem Relation Age of Onset    Diabetes Mother     Diabetes Father      Social History     Tobacco Use    Smoking status: Never Smoker    Smokeless tobacco: Never Used   Substance Use Topics    Alcohol use: No    Drug use: No       Review of patient's allergies indicates:   Allergen Reactions    Penicillins Hives       Outpatient meds:  No current facility-administered medications on file prior to encounter.     Current Outpatient Medications on File Prior to Encounter   Medication Sig Dispense Refill    amLODIPine (NORVASC) 10 MG tablet Take 1 tablet (10 mg total) by mouth once daily. 30 tablet 11    apixaban (ELIQUIS) 5 mg Tab Take 1 tablet (5 mg total) by mouth 2 (two) times daily. For second month on. 60 tablet 2    carvediloL (COREG) 25 MG tablet Take 1 tablet (25 mg total) by mouth 2 (two) times daily. 60 tablet 2    desvenlafaxine succinate (PRISTIQ) 50 MG Tb24 Take 50 mg by mouth.      famotidine (PEPCID) 20 MG tablet Take 1 tablet (20 mg total) by mouth once daily. 30 tablet 0    FLUoxetine 20 MG capsule Take 1 capsule (20 mg total) by mouth once daily. 30 capsule 1    insulin aspart U-100 (NOVOLOG) 100 unit/mL (3 mL) InPn pen Inject 1-10 Units into the skin before meals and at bedtime as needed (Hyperglycemia). 3 mL 3    isosorbide mononitrate (IMDUR) 60 MG 24 hr tablet Take 2 tablets (120 mg total) by mouth once daily. 30 tablet 1    levETIRAcetam (KEPPRA) 500 MG Tab Take 1 tablet (500 mg total) by mouth 2 (two) times daily. 60 tablet 1    omeprazole (PRILOSEC) 20 MG capsule Take 2 capsules (40 mg total) by mouth once daily. for 10 days 20 capsule 0     ondansetron (ZOFRAN-ODT) 4 MG TbDL Take 1 tablet (4 mg total) by mouth every 8 (eight) hours as needed (nausea, vomiting). 12 tablet 0    sodium bicarbonate 650 MG tablet Take 1,300 mg by mouth 2 (two) times daily.      tamsulosin (FLOMAX) 0.4 mg Cap Take 0.8 mg by mouth once daily.      ARIPiprazole (ABILIFY) 5 MG Tab Take 5 mg by mouth once daily.      bisacodyL (DULCOLAX) 5 mg EC tablet Take 2 tablets (10 mg total) by mouth daily as needed for Constipation. (Patient not taking: Reported on 8/23/2022) 14 tablet 0    cloNIDine 0.2 mg/24 hr td ptwk (CATAPRES) 0.2 mg/24 hr Place 1 patch onto the skin every 7 days. (Patient not taking: Reported on 8/23/2022) 4 patch 2    furosemide (LASIX) 40 MG tablet Take 1 tablet (40 mg total) by mouth 2 (two) times daily. (Patient not taking: Reported on 8/23/2022) 60 tablet 0    hydrALAZINE (APRESOLINE) 100 MG tablet Take 1 tablet (100 mg total) by mouth every 8 (eight) hours. (Patient not taking: Reported on 8/23/2022) 90 tablet 2    LANTUS U-100 INSULIN 100 unit/mL injection Inject 5 Units into the skin once daily.      oxyCODONE-acetaminophen (PERCOCET) 5-325 mg per tablet Take 1 tablet by mouth every 6 (six) hours as needed for Pain. (Patient not taking: Reported on 8/23/2022) 20 tablet 0    polyethylene glycol (GLYCOLAX) 17 gram/dose powder Take 17 g by mouth once daily. (Patient not taking: Reported on 8/23/2022) 510 g 1    [DISCONTINUED] atenoloL (TENORMIN) 50 MG tablet Take 1 tablet (50 mg total) by mouth every other day. 45 tablet 3    [DISCONTINUED] pantoprazole (PROTONIX) 40 MG tablet Take 1 tablet (40 mg total) by mouth once daily. 30 tablet 3    [DISCONTINUED] torsemide (DEMADEX) 20 MG Tab Take 1 tablet (20 mg total) by mouth 2 (two) times a day. (Patient taking differently: Take 20 mg by mouth once daily.) 60 tablet 1       Scheduled meds:   amLODIPine  10 mg Oral Daily    ARIPiprazole  5 mg Oral Daily    carvediloL  25 mg Oral BID    cloNIDine  0.2 mg/24 hr td ptwk  1 patch Transdermal Q7 Days    desvenlafaxine succinate  50 mg Oral Daily    famotidine  20 mg Oral Daily    FLUoxetine  20 mg Oral Daily    furosemide (LASIX) injection  80 mg Intravenous Once    hydrALAZINE  100 mg Oral Q8H    insulin detemir U-100  5 Units Subcutaneous QHS    isosorbide mononitrate  120 mg Oral Daily    levETIRAcetam  500 mg Oral BID    polyethylene glycol  17 g Oral Daily    polyethylene glycol  340 g Oral Once    tamsulosin  0.8 mg Oral Daily       Infusions:   pantoprazole (PROTONIX) IV infusion 8 mg/hr (08/23/22 2235)       PRN meds:  acetaminophen, bisacodyL, calcium chloride IVPB, calcium chloride IVPB, calcium chloride IVPB, dextrose 50%, dextrose 50%, diphenhydrAMINE, fentaNYL, glucagon (human recombinant), glucose, glucose, hydrALAZINE, hydrALAZINE, HYDROcodone-acetaminophen, insulin aspart U-100, magnesium oxide, magnesium sulfate IVPB, magnesium sulfate IVPB, magnesium sulfate IVPB, magnesium sulfate IVPB, melatonin, midazolam, morphine, naloxone, ondansetron, polyethylene glycol, potassium chloride in water, potassium chloride in water, potassium chloride in water, potassium chloride in water, potassium chloride, potassium chloride, potassium chloride, potassium chloride, senna-docusate 8.6-50 mg, simethicone, sodium chloride 0.9%, sodium phosphate IVPB, sodium phosphate IVPB, sodium phosphate IVPB, sodium phosphate IVPB, sodium phosphate IVPB    Review of Systems:    OBJECTIVE:     Vital Signs and IO (Last 24H):  Temp:  [95.2 °F (35.1 °C)-98.4 °F (36.9 °C)]   Pulse:  [77-87]   Resp:  [12-22]   BP: (149-190)/()   SpO2:  [90 %-100 %]   I/O last 3 completed shifts:  In: 1804 [Blood:604; Other:1200]  Out: 3200 [Other:3200]    Wt Readings from Last 5 Encounters:   08/24/22 70.7 kg (155 lb 13.8 oz)   08/09/22 74.8 kg (165 lb)   07/30/22 74.8 kg (165 lb)   07/26/22 75.2 kg (165 lb 12.6 oz)   07/22/22 74.8 kg (165 lb)     Physical  Exam:  Constitutional:       Appearance: She is well-developed. She is not diaphoretic.   HENT:      Head: Normocephalic and atraumatic.   Eyes:      General: No scleral icterus.     Pupils: Pupils are equal, round, and reactive to light.   Cardiovascular:      Rate and Rhythm: Normal rate and regular rhythm.   Pulmonary:      Effort: Pulmonary effort is normal. No respiratory distress.      Breath sounds: No stridor.   Abdominal:      General: There is no distension.      Palpations: Abdomen is soft.   Musculoskeletal:         General: No deformity. Normal range of motion.      Cervical back: Neck supple.   Skin:     General: Skin is warm and dry.      Findings: No erythema or rash.   Neurological:      Mental Status: She is alert and oriented to person, place, and time.      Cranial Nerves: No cranial nerve deficit.   Psychiatric:         Behavior: Behavior normal.     Body mass index is 28.5 kg/m².    Laboratory:  Recent Labs   Lab 08/23/22  1155 08/24/22  0218   * 135*   K 4.3 3.9   CL 97 100   CO2 28 27   BUN 39* 28*   CREATININE 3.9* 3.0*   * 70       Recent Labs   Lab 08/23/22  1155 08/24/22  0218   CALCIUM 7.4* 7.8*   ALBUMIN 2.7*  --    MG  --  2.1       Recent Labs   Lab 07/08/22  0516   PTH, Intact 289.8 H       No results for input(s): POCTGLUCOSE in the last 168 hours.    Recent Labs   Lab 03/06/22  1135 05/15/22  1954 07/22/22  1653   Hemoglobin A1C 5.3 5.8 H 6.0 H       Recent Labs   Lab 08/23/22  1155 08/24/22  0218   WBC 9.99 11.61   HGB 4.7* 9.4*   HCT 13.7* 26.8*   PLT 90* 99*   MCV 84 84   MCHC 34.3 35.1   MONO 8.8  0.9 7.9  0.9       Recent Labs   Lab 08/23/22  1155   BILITOT 1.4*   PROT 5.7*   ALBUMIN 2.7*   ALKPHOS 109   ALT 46*   AST 78*       Recent Labs   Lab 05/28/22 2036 06/11/22  1115 07/07/22  0912   Color, UA Yellow Yellow Yellow   Appearance, UA Clear Clear Clear   pH, UA 7.0 7.0 6.0   Specific [place], UA 1.020 1.015 1.020   Protein, UA 3+ A 3+ A 3+ A   Glucose, UA 2+  A 2+ A Negative   Ketones, UA Negative Negative Negative   Urobilinogen, UA Negative Negative Negative   Bilirubin (UA) Negative Negative Negative   Occult Blood UA 2+ A 1+ A 2+ A   Nitrite, UA Negative Negative Negative   RBC, UA 5 H 3 3   WBC, UA 1 4 8 H   Bacteria None Occasional Rare   Hyaline Casts, UA 0 0 0       Recent Labs   Lab 07/23/22  0558 07/23/22  1430 07/24/22  0445   POC PH 7.406 7.526 H 7.637 HH   POC PCO2 37.2 26.2 LL 26.0 LL   POC HCO3 23.4 L 21.7 L 27.8   POC PO2 80 55  H   POC SATURATED O2 96 92 L 99   POC BE -1 -1 7   Sample ARTERIAL ARTERIAL ARTERIAL       Microbiology Results (last 7 days)     ** No results found for the last 168 hours. **        ASSESSMENT/PLAN:     ESRD on HD MWF via catheter  Continue current dialysis prescription.  Next HD per schedule.  Renal diet - low K, low phos.  No IVs or BP checks on access and/or non-dominant arm.    Anemia of CKD  ? GIB  Hgb and HCT are acceptable. Monitor.  Will provide SHELLEY and/or IV iron PRN.  Transfused 3 PRBC on 8/22.    MBD / Secondary HPT  Ca, phos, PTH and vitamin D levels are acceptable.   Phos binders, vitamin D analogues and calcimimetics as needed.    HTN  BP seem controlled.   Tolerate asymptomatic HTN up to -160.  Continue home meds.  Low sodium diet.    Abdominal pain  s/p lap freedom recently. Plan per GI and Surgery.    Thank you for allowing us to participate in the care of your patient!   We will follow the patient and provide recommendations as needed.    Patient care time was spent personally by me on the following activities:   · Obtaining a history.  · Examination of patient.  · Providing medical care at the patients bedside.  · Developing a treatment plan with patient or surrogate and bedside caregivers.  · Ordering and reviewing laboratory studies, radiographic studies, pulse oximetry.  · Ordering and performing treatments and interventions.  · Evaluation of patient's response to treatment.  · Discussions with  consultants while on the unit and immediately available to the patient.  · Re-evaluation of the patient's condition.  · Documentation in the medical record.     Mando Benitez MD    [facility]  [address] [place] [address], [place] [id number] [phone] - tel  [phone] - fax    8/24/2022

## 2022-08-24 NOTE — CONSULTS
Nephrology Consult Note        Patient Name: Tabby Howard  MRN: [id number]    Patient Class: IP- Inpatient   Admission Date: 2022  Length of Stay: 0 days  Date of Service: 2022    Attending Physician: Amalia Jefferson MD  Primary Care Provider: [facility]    Reason for Consult: esrd/anemia/htn/shpt/abdominal pain    SUBJECTIVE:     HPI: 32F with lap freedom 2022, HTN, ESRD on HD MWF, DM, transfusion dependent chronic anemia, CHF, DVT (Eliquis), sickle cell trait, gastroparesis presents with weakness and epig pain. No signs of bleeding, h/o NSAIDs. Reports compiance w/ blood thinner. Got pRBCs on admission for Hgb 4.7. CT Abd 2022 - gastritis; hematoma in GB fossa; volume overload; Nml Liver / panc. EGD  - gastritis. Renal consulted for dialysis.    Past Medical History:   Diagnosis Date    CKD (chronic kidney disease), stage IV 2022    Diabetes mellitus due to underlying condition with unspecified complications 2022    Gastroparesis 2022    Heart failure with preserved ejection fraction 2022    EF 55% on 3/22    History of gastroesophageal reflux (GERD)     History of supraventricular tachycardia     Hyperkalemia 2022    Hypertensive emergency 2022    Sickle cell trait 2022    Type 2 diabetes mellitus      Past Surgical History:   Procedure Laterality Date     SECTION      x 3    ESOPHAGOGASTRODUODENOSCOPY N/A 10/18/2019    Procedure: ESOPHAGOGASTRODUODENOSCOPY (EGD);  Surgeon: Gianluca Mendez MD;  Location: [facility];  Service: Endoscopy;  Laterality: N/A;    LAPAROSCOPIC CHOLECYSTECTOMY N/A 2022    Procedure: CHOLECYSTECTOMY, LAPAROSCOPIC;  Surgeon: Grey Perez MD;  Location: [facility] OR;  Service: General;  Laterality: N/A;    PLACEMENT OF DUAL-LUMEN VASCULAR CATHETER Left 2022    Procedure: INSERTION-CATHETER-JOSEPH;  Surgeon: Dionte Gan MD;  Location: [facility] beds at the [facility];  Service: General;   Laterality: Left;    PLACEMENT OF DUAL-LUMEN VASCULAR CATHETER Right 7/26/2022    Procedure: INSERTION-CATHETER-Hemosplit;  Surgeon: Dionte Gan MD;  Location: [facility];  Service: General;  Laterality: Right;     Family History   Problem Relation Age of Onset    Diabetes Mother     Diabetes Father      Social History     Tobacco Use    Smoking status: Never Smoker    Smokeless tobacco: Never Used   Substance Use Topics    Alcohol use: No    Drug use: No       Review of patient's allergies indicates:   Allergen Reactions    Penicillins Hives       Outpatient meds:  No current facility-administered medications on file prior to encounter.     Current Outpatient Medications on File Prior to Encounter   Medication Sig Dispense Refill    amLODIPine (NORVASC) 10 MG tablet Take 1 tablet (10 mg total) by mouth once daily. 30 tablet 11    [START ON 8/24/2022] apixaban (ELIQUIS) 5 mg Tab Take 1 tablet (5 mg total) by mouth 2 (two) times daily. For second month on. 60 tablet 2    carvediloL (COREG) 25 MG tablet Take 1 tablet (25 mg total) by mouth 2 (two) times daily. 60 tablet 2    desvenlafaxine succinate (PRISTIQ) 50 MG Tb24 Take 50 mg by mouth.      famotidine (PEPCID) 20 MG tablet Take 1 tablet (20 mg total) by mouth once daily. 30 tablet 0    FLUoxetine 20 MG capsule Take 1 capsule (20 mg total) by mouth once daily. 30 capsule 1    insulin aspart U-100 (NOVOLOG) 100 unit/mL (3 mL) InPn pen Inject 1-10 Units into the skin before meals and at bedtime as needed (Hyperglycemia). 3 mL 3    isosorbide mononitrate (IMDUR) 60 MG 24 hr tablet Take 2 tablets (120 mg total) by mouth once daily. 30 tablet 1    levETIRAcetam (KEPPRA) 500 MG Tab Take 1 tablet (500 mg total) by mouth 2 (two) times daily. 60 tablet 1    omeprazole (PRILOSEC) 20 MG capsule Take 2 capsules (40 mg total) by mouth once daily. for 10 days 20 capsule 0    ondansetron (ZOFRAN-ODT) 4 MG TbDL Take 1 tablet (4 mg total) by mouth every 8  (eight) hours as needed (nausea, vomiting). 12 tablet 0    sodium bicarbonate 650 MG tablet Take 1,300 mg by mouth 2 (two) times daily.      tamsulosin (FLOMAX) 0.4 mg Cap Take 0.8 mg by mouth once daily.      ARIPiprazole (ABILIFY) 5 MG Tab Take 5 mg by mouth once daily.      bisacodyL (DULCOLAX) 5 mg EC tablet Take 2 tablets (10 mg total) by mouth daily as needed for Constipation. (Patient not taking: Reported on 8/23/2022) 14 tablet 0    cloNIDine 0.2 mg/24 hr td ptwk (CATAPRES) 0.2 mg/24 hr Place 1 patch onto the skin every 7 days. (Patient not taking: Reported on 8/23/2022) 4 patch 2    furosemide (LASIX) 40 MG tablet Take 1 tablet (40 mg total) by mouth 2 (two) times daily. (Patient not taking: Reported on 8/23/2022) 60 tablet 0    hydrALAZINE (APRESOLINE) 100 MG tablet Take 1 tablet (100 mg total) by mouth every 8 (eight) hours. (Patient not taking: Reported on 8/23/2022) 90 tablet 2    LANTUS U-100 INSULIN 100 unit/mL injection Inject 5 Units into the skin once daily.      oxyCODONE-acetaminophen (PERCOCET) 5-325 mg per tablet Take 1 tablet by mouth every 6 (six) hours as needed for Pain. (Patient not taking: Reported on 8/23/2022) 20 tablet 0    polyethylene glycol (GLYCOLAX) 17 gram/dose powder Take 17 g by mouth once daily. (Patient not taking: Reported on 8/23/2022) 510 g 1    [DISCONTINUED] apixaban (ELIQUIS) 5 mg Tab TAKE 2 TABLETS BY MOUTH TWICE DAILY FOR 7 DAYS THEN 1 TABLET TWICE DAILY THERE AFTER 74 tablet 0    [DISCONTINUED] atenoloL (TENORMIN) 50 MG tablet Take 1 tablet (50 mg total) by mouth every other day. 45 tablet 3    [DISCONTINUED] metoclopramide HCl (REGLAN) 5 MG tablet Take 5 mg by mouth 5 (five) times daily.      [DISCONTINUED] pantoprazole (PROTONIX) 40 MG tablet Take 1 tablet (40 mg total) by mouth once daily. 30 tablet 3    [DISCONTINUED] torsemide (DEMADEX) 20 MG Tab Take 1 tablet (20 mg total) by mouth 2 (two) times a day. (Patient taking differently: Take 20 mg by  mouth once daily.) 60 tablet 1       Scheduled meds:   [START ON 8/24/2022] amLODIPine  10 mg Oral Daily    [START ON 8/24/2022] ARIPiprazole  5 mg Oral Daily    carvediloL  25 mg Oral BID    [START ON 8/24/2022] cloNIDine 0.2 mg/24 hr td ptwk  1 patch Transdermal Q7 Days    [START ON 8/24/2022] desvenlafaxine succinate  50 mg Oral Daily    [START ON 8/24/2022] famotidine  20 mg Oral Daily    [START ON 8/24/2022] FLUoxetine  20 mg Oral Daily    furosemide (LASIX) injection  80 mg Intravenous Once    hydrALAZINE  100 mg Oral Q8H    insulin detemir U-100  5 Units Subcutaneous QHS    [START ON 8/24/2022] isosorbide mononitrate  120 mg Oral Daily    levETIRAcetam  500 mg Oral BID    [START ON 8/24/2022] pantoprazole  40 mg Oral Daily    [START ON 8/24/2022] polyethylene glycol  17 g Oral Daily    [START ON 8/24/2022] tamsulosin  0.8 mg Oral Daily       Infusions:   pantoprazole (PROTONIX) IV infusion         PRN meds:  acetaminophen, bisacodyL, calcium chloride IVPB, calcium chloride IVPB, calcium chloride IVPB, dextrose 50%, dextrose 50%, glucagon (human recombinant), glucose, glucose, HYDROcodone-acetaminophen, insulin aspart U-100, magnesium oxide, magnesium sulfate IVPB, magnesium sulfate IVPB, magnesium sulfate IVPB, magnesium sulfate IVPB, melatonin, morphine, naloxone, ondansetron, polyethylene glycol, potassium chloride in water, potassium chloride in water, potassium chloride in water, potassium chloride in water, potassium chloride, potassium chloride, potassium chloride, potassium chloride, senna-docusate 8.6-50 mg, simethicone, sodium chloride 0.9%, sodium phosphate IVPB, sodium phosphate IVPB, sodium phosphate IVPB, sodium phosphate IVPB, sodium phosphate IVPB    Review of Systems:  Review of Systems   Constitutional: Positive for malaise/fatigue. Negative for chills, fever and weight loss.   HENT: Negative for hearing loss and nosebleeds.    Eyes: Negative for blurred vision, double vision  and photophobia.   Respiratory: Negative for cough, shortness of breath and wheezing.    Cardiovascular: Negative for chest pain, palpitations and leg swelling.   Gastrointestinal: Positive for abdominal pain. Negative for constipation, diarrhea, heartburn, nausea and vomiting.   Genitourinary: Negative for dysuria, frequency and urgency.   Musculoskeletal: Negative for falls, joint pain and myalgias.   Skin: Negative for itching and rash.   Neurological: Positive for weakness. Negative for dizziness, speech change, focal weakness, loss of consciousness and headaches.   Endo/Heme/Allergies: Does not bruise/bleed easily.   Psychiatric/Behavioral: Negative for depression and substance abuse. The patient is not nervous/anxious.      OBJECTIVE:     Vital Signs and IO (Last 24H):  Temp:  [95.2 °F (35.1 °C)-96.1 °F (35.6 °C)]   Pulse:  [77-85]   Resp:  [14-22]   BP: (162-190)/(102-119)   SpO2:  [96 %-99 %]   No intake/output data recorded.    Wt Readings from Last 5 Encounters:   08/09/22 74.8 kg (165 lb)   07/30/22 74.8 kg (165 lb)   07/26/22 75.2 kg (165 lb 12.6 oz)   07/22/22 74.8 kg (165 lb)   07/21/22 76.1 kg (167 lb 12.3 oz)     Physical Exam:  Physical Exam  Constitutional:       Appearance: She is well-developed. She is not diaphoretic.   HENT:      Head: Normocephalic and atraumatic.   Eyes:      General: No scleral icterus.     Pupils: Pupils are equal, round, and reactive to light.   Cardiovascular:      Rate and Rhythm: Normal rate and regular rhythm.   Pulmonary:      Effort: Pulmonary effort is normal. No respiratory distress.      Breath sounds: No stridor.   Abdominal:      General: There is no distension.      Palpations: Abdomen is soft.   Musculoskeletal:         General: No deformity. Normal range of motion.      Cervical back: Neck supple.   Skin:     General: Skin is warm and dry.      Findings: No erythema or rash.   Neurological:      Mental Status: She is alert and oriented to person, place, and  time.      Cranial Nerves: No cranial nerve deficit.   Psychiatric:         Behavior: Behavior normal.         Body mass index is 30.18 kg/m².    Laboratory:  Recent Labs   Lab 08/23/22  1155   *   K 4.3   CL 97   CO2 28   BUN 39*   CREATININE 3.9*   *       Recent Labs   Lab 08/23/22  1155   CALCIUM 7.4*   ALBUMIN 2.7*       Recent Labs   Lab 07/08/22  0516   PTH, Intact 289.8 H       No results for input(s): POCTGLUCOSE in the last 168 hours.    Recent Labs   Lab 03/06/22  1135 05/15/22  1954 07/22/22  1653   Hemoglobin A1C 5.3 5.8 H 6.0 H       Recent Labs   Lab 08/23/22  1155   WBC 9.99   HGB 4.7*   HCT 13.7*   PLT 90*   MCV 84   MCHC 34.3   MONO 8.8  0.9       Recent Labs   Lab 08/23/22  1155   BILITOT 1.4*   PROT 5.7*   ALBUMIN 2.7*   ALKPHOS 109   ALT 46*   AST 78*       Recent Labs   Lab 05/28/22  2036 06/11/22  1115 07/07/22  0912   Color, UA Yellow Yellow Yellow   Appearance, UA Clear Clear Clear   pH, UA 7.0 7.0 6.0   Specific [place], UA 1.020 1.015 1.020   Protein, UA 3+ A 3+ A 3+ A   Glucose, UA 2+ A 2+ A Negative   Ketones, UA Negative Negative Negative   Urobilinogen, UA Negative Negative Negative   Bilirubin (UA) Negative Negative Negative   Occult Blood UA 2+ A 1+ A 2+ A   Nitrite, UA Negative Negative Negative   RBC, UA 5 H 3 3   WBC, UA 1 4 8 H   Bacteria None Occasional Rare   Hyaline Casts, UA 0 0 0       Recent Labs   Lab 07/23/22  0558 07/23/22  1430 07/24/22  0445   POC PH 7.406 7.526 H 7.637 HH   POC PCO2 37.2 26.2 LL 26.0 LL   POC HCO3 23.4 L 21.7 L 27.8   POC PO2 80 55  H   POC SATURATED O2 96 92 L 99   POC BE -1 -1 7   Sample ARTERIAL ARTERIAL ARTERIAL       Microbiology Results (last 7 days)     ** No results found for the last 168 hours. **        ASSESSMENT/PLAN:     ESRD on HD MWF via catheter  Continue current dialysis prescription.  Next HD per schedule.  Renal diet - low K, low phos.  No IVs or BP checks on access and/or non-dominant arm.    Anemia of CKD  ?  GIB  Hgb and HCT are acceptable. Monitor.  Will provide SHELLEY and/or IV iron PRN.  Transfuse blood 3 PRBC.    MBD / Secondary HPT  Ca, phos, PTH and vitamin D levels are acceptable.   Phos binders, vitamin D analogues and calcimimetics as needed.    HTN  BP seem controlled.   Tolerate asymptomatic HTN up to -160.  Continue home meds.  Low sodium diet.    Abdominal pain  S/p lap freedom recently. Plan per GI and Surgery.    Thank you for allowing us to participate in the care of your patient!   We will follow the patient and provide recommendations as needed.    Patient care time was spent personally by me on the following activities:   · Obtaining a history.  · Examination of patient.  · Providing medical care at the patients bedside.  · Developing a treatment plan with patient or surrogate and bedside caregivers.  · Ordering and reviewing laboratory studies, radiographic studies, pulse oximetry.  · Ordering and performing treatments and interventions.  · Evaluation of patient's response to treatment.  · Discussions with consultants while on the unit and immediately available to the patient.  · Re-evaluation of the patient's condition.  · Documentation in the medical record.     Mando Benitez MD    [facility]  [address] [place] [address]  JEANMARIE Connolly [address]    [phone] - tel  [phone] - fax    8/23/2022

## 2022-08-24 NOTE — NURSING
PATIENT REPORTS THAT SHE WILL TAKE THE PAIN PILL FOR PAIN BUT THE MORPHINE WILL NOT WORK. NORCO 5/325MG PO GIVEN FOR PAIN. WILL FOLLOW UP

## 2022-08-24 NOTE — PROGRESS NOTES
HD tx tolerated well  Net UF 2L  2 units PRBC administered  TDC dressing applied, none present on arrival       08/23/22 2100   Handoff Report   Received From Stephanie Bender   Given To Yulia   Vital Signs   Temp 97.6 °F (36.4 °C)   Temp src Axillary   Pulse 81   Resp 16   SpO2 99 %   Flow (L/min) 2   BP (!) 178/111   Assessments (Pre/Post)   Blood Liters Processed (BLP) 42   Transport Modality bed   Level of Consciousness (AVPU) alert   Dialyzer Clearance mildly streaked   Post-Hemodialysis Assessment   Rinseback Volume (mL) 250 mL   Blood Volume Processed (Liters) 42 L   Dialyzer Clearance Lightly streaked   Additional Fluid Intake (mL) 1200 mL   Total UF (mL) 3200 mL   Net Fluid Removal 2000   Patient Response to Treatment Tolerated well   Post-Treatment Weight 72.8 kg (160 lb 7.9 oz)   Treatment Weight Change -2   Post-Hemodialysis Comments Tx complete, pt reinfused per protocol

## 2022-08-24 NOTE — NURSING
PATIENT YELLING AND SCREAMING AND STATED THAT SHE WANTS TO TALK TO THE DOCTOR BECAUSE THE MORPHINE IS NOT GONNA WORK. PATIENT PULLED TELEMETRY OFF AND REPORTED THAT SHE WAS NOT GOING TO WEAR CARDIAC MONITOR.

## 2022-08-24 NOTE — PLAN OF CARE
08/24/22 1155   Patient Assessment/Suction   Level of Consciousness (AVPU) alert   Respiratory Effort Normal;Unlabored   All Lung Fields Breath Sounds clear   PRE-TX-O2   O2 Device (Oxygen Therapy) nasal cannula   $ Is the patient on Low Flow Oxygen? Yes   Flow (L/min) 2   SpO2 96 %   Pulse Oximetry Type Continuous   $ Pulse Oximetry - Multiple Charge Pulse Oximetry - Multiple   Pulse 81   Resp 18   ETCO2   ETCO2 (mmHg) 0 mmHg   Education   $ Education 15 min   Respiratory Evaluation   $ Care Plan Tech Time 15 min

## 2022-08-24 NOTE — PROVATION PATIENT INSTRUCTIONS
Discharge Summary/Instructions after an Endoscopic Procedure  Patient Name: Tabby Howard  Patient MRN: [id number]  Patient YOB: 1990 Wednesday, August 24, 2022  Micky Paredes III, MD  RESTRICTIONS:  During your procedure today, you received medications for sedation.  These   medications may affect your judgment, balance and coordination.  Therefore,   for 24 hours, you have the following restrictions:   - DO NOT drive a car, operate machinery, make legal/financial decisions,   sign important papers or drink alcohol.    ACTIVITY:  Today: no heavy lifting, straining or running due to procedural   sedation/anesthesia.  The following day: return to full activity including work.  DIET:  Eat and drink normally unless instructed otherwise.     TREATMENT FOR COMMON SIDE EFFECTS:  - Mild abdominal pain, nausea, belching, bloating or excessive gas:  rest,   eat lightly and use a heating pad.  - Sore Throat: treat with throat lozenges and/or gargle with warm salt   water.  - Because air was used during the procedure, expelling large amounts of air   from your rectum or belching is normal.  - If a bowel prep was taken, you may not have a bowel movement for 1-3 days.    This is normal.  SYMPTOMS TO WATCH FOR AND REPORT TO YOUR PHYSICIAN:  1. Abdominal pain or bloating, other than gas cramps.  2. Chest pain.  3. Back pain.  4. Signs of infection such as: chills or fever occurring within 24 hours   after the procedure.  5. Rectal bleeding, which would show as bright red, maroon, or black stools.   (A tablespoon of blood from the rectum is not serious, especially if   hemorrhoids are present.)  6. Vomiting.  7. Weakness or dizziness.  GO DIRECTLY TO THE NEAREST EMERGENCY ROOM IF YOU HAVE ANY OF THE FOLLOWING:      Difficulty breathing              Chills and/or fever over 101 F   Persistent vomiting and/or vomiting blood   Severe abdominal pain   Severe chest pain   Black, tarry stools   Bleeding- more than one  tablespoon   Any other symptom or condition that you feel may need urgent attention  Your doctor recommends these additional instructions:  If any biopsies were taken, your doctors clinic will contact you in 1 to 2   weeks with any results.  - Return patient to hospital [facility] for ongoing care.   - Colonoscopy tomorrow as not signs of bleeding on EGD  For questions, problems or results please call your physician - Micky Paredes III, MD at Work:  [phone].  [facility], EMERGENCY ROOM PHONE NUMBER: [phone]  IF A COMPLICATION OR EMERGENCY SITUATION ARISES AND YOU ARE UNABLE TO REACH   YOUR PHYSICIAN - GO DIRECTLY TO THE EMERGENCY ROOM.  Micky Paredes III, MD  8/24/2022 8:10:33 AM  This report has been verified and signed electronically.  Dear patient,  As a result of recent federal legislation (The Federal Cures Act), you may   receive lab or pathology results from your procedure in your [facility]   account before your physician is able to contact you. Your physician or   their representative will relay the results to you with their   recommendations at their soonest availability.  Thank you,  PROVATION

## 2022-08-24 NOTE — PROGRESS NOTES
[facility] Medicine  Progress Note    Patient name: Tabby Howard  MRN: [id number]  Admit Date: 8/23/2022   LOS: 1 day     SUBJECTIVE:     Principal problem: Anemia    Interval History:  Patient was seen and examined bedside, overnight she refused telemetry and lab draws, wanted IV Dilaudid.  No hematemesis or melena while inpatient, underwent EGD which was normal.  Going for colonoscopy tomorrow a.m. CBC has responded well with 3 units PRBC transfusion    Scheduled Meds:   amLODIPine  10 mg Oral Daily    ARIPiprazole  5 mg Oral Daily    carvediloL  25 mg Oral BID    cloNIDine 0.2 mg/24 hr td ptwk  1 patch Transdermal Q7 Days    desvenlafaxine succinate  50 mg Oral Daily    famotidine  20 mg Oral Daily    FLUoxetine  20 mg Oral Daily    furosemide (LASIX) injection  80 mg Intravenous Once    hydrALAZINE  100 mg Oral Q8H    insulin detemir U-100  5 Units Subcutaneous QHS    isosorbide mononitrate  120 mg Oral Daily    levETIRAcetam  500 mg Oral BID    pantoprazole  40 mg Oral BID    polyethylene glycol  17 g Oral Daily    polyethylene glycol  340 g Oral Once    tamsulosin  0.8 mg Oral Daily     Continuous Infusions:  PRN Meds:acetaminophen, bisacodyL, calcium chloride IVPB, calcium chloride IVPB, calcium chloride IVPB, dextrose 50%, dextrose 50%, glucagon (human recombinant), glucose, glucose, hydrALAZINE, hydrALAZINE, HYDROcodone-acetaminophen, insulin aspart U-100, magnesium oxide, magnesium sulfate IVPB, magnesium sulfate IVPB, magnesium sulfate IVPB, magnesium sulfate IVPB, melatonin, morphine, naloxone, ondansetron, polyethylene glycol, potassium chloride in water, potassium chloride in water, potassium chloride in water, potassium chloride in water, potassium chloride, potassium chloride, potassium chloride, potassium chloride, senna-docusate 8.6-50 mg, simethicone, sodium chloride 0.9%, sodium phosphate IVPB, sodium phosphate IVPB, sodium phosphate IVPB, sodium phosphate  IVPB, sodium phosphate IVPB    Review of patient's allergies indicates:   Allergen Reactions    Penicillins Hives       Review of Systems: As per interval history    OBJECTIVE:     Vital Signs (Most Recent)  Temp: 98.1 °F (36.7 °C) (08/24/22 0736)  Pulse: 80 (08/24/22 0830)  Resp: 14 (08/24/22 0830)  BP: (!) 152/102 (08/24/22 0830)  SpO2: (!) 94 % (08/24/22 0830)    Vital Signs Range (Last 24H):  Temp:  [95.2 °F (35.1 °C)-98.4 °F (36.9 °C)]   Pulse:  [77-87]   Resp:  [12-22]   BP: (149-190)/()   SpO2:  [90 %-100 %]     I & O (Last 24H):    Intake/Output Summary (Last 24 hours) at 8/24/2022 1017  Last data filed at 8/23/2022 2100  Gross per 24 hour   Intake 1804 ml   Output 3200 ml   Net -1396 ml       Physical Exam:  General: Patient resting comfortably in no acute distress. Appears as stated age. Calm  Eyes: No conjunctival injection. No scleral icterus.  ENT: Hearing grossly intact. No discharge from ears. No nasal discharge.   Neck: Supple, trachea midline. No JVD  CVS: RRR. No LE edema BL  Lungs:  No tachypnea or accessory muscle use.  Clear to auscultation bilaterally  Abdomen:  Soft, nontender and nondistended.  No organomegaly  Neuro: AOx3. Moves all extremities. Follows commands. Responds appropriately   Skin:  No rash or erythema noted    Laboratory:  I have reviewed all pertinent lab results within the past 24 hours.    Diagnostic Results:  Reviewed all imaging    ASSESSMENT/PLAN:     32-year-old lady with recent cholecystectomy came from dialysis due to chest pain/epigastric pain found to have severe symptomatic anemia.   status post 3 units PRBC transfusion overnight, underwent EGD which was normal, going for colonoscopy tomorrow a.m.    Active Hospital Problems    Diagnosis  POA    *Anemia [D64.9]  Yes    Chest pain [R07.9]  Yes    HTN (hypertension) [I10]  Yes    ESRD (end stage renal disease) [N18.6]  Yes    Seizure [R56.9]  Yes    Type II diabetes mellitus [E11.9]  Yes      Resolved  Hospital Problems   No resolved problems to display.         Plan:   Status post 3 units PRBC transfusion overnight with good response, CBCs stable, no hematemesis or melena while inpatient  Status post EGD today which was normal  Going for colonoscopy tomorrow a.m.  PPI  Consult renal for dialysis need  Judicious use of analgesia  Continue current orders  P.r.n. hydralazine for blood pressure above 170 mmHg  Further management as per clinical course      VTE Risk Mitigation (From admission, onward)         Ordered     IP VTE HIGH RISK PATIENT  Once         08/23/22 1830     Place sequential compression device  Until discontinued         08/23/22 1830                    Department Hospital Medicine  [facility]  Amalia Jefferson MD  Date of service: 08/24/2022

## 2022-08-24 NOTE — NURSING
PATIENT REQUESTING TO TALK WITH DR. BOTELLO REGARDING THE DILAUDID NOT GIVE. I NOTIFIED MD OF PATIENT REQUEST. DR. BOTELLO REPORTED THAT SHE WAS BUSY AT THIS TIME.

## 2022-08-25 ENCOUNTER — ANESTHESIA (OUTPATIENT)
Dept: SURGERY | Facility: HOSPITAL | Age: 32
DRG: 291 | End: 2022-08-25
Payer: MEDICAID

## 2022-08-25 ENCOUNTER — ANESTHESIA EVENT (OUTPATIENT)
Dept: SURGERY | Facility: HOSPITAL | Age: 32
DRG: 291 | End: 2022-08-25
Payer: MEDICAID

## 2022-08-25 VITALS
SYSTOLIC BLOOD PRESSURE: 138 MMHG | TEMPERATURE: 98 F | HEART RATE: 81 BPM | OXYGEN SATURATION: 100 % | RESPIRATION RATE: 15 BRPM | DIASTOLIC BLOOD PRESSURE: 85 MMHG

## 2022-08-25 LAB
ALBUMIN SERPL BCP-MCNC: 2.3 G/DL (ref 3.5–5.2)
ALP SERPL-CCNC: 130 U/L (ref 55–135)
ALT SERPL W/O P-5'-P-CCNC: 58 U/L (ref 10–44)
ANION GAP SERPL CALC-SCNC: 10 MMOL/L (ref 8–16)
AST SERPL-CCNC: 44 U/L (ref 10–40)
BASOPHILS # BLD AUTO: 0.02 K/UL (ref 0–0.2)
BASOPHILS NFR BLD: 0.3 % (ref 0–1.9)
BILIRUB SERPL-MCNC: 1.5 MG/DL (ref 0.1–1)
BLD PROD TYP BPU: NORMAL
BLD PROD TYP BPU: NORMAL
BLOOD UNIT EXPIRATION DATE: NORMAL
BLOOD UNIT EXPIRATION DATE: NORMAL
BLOOD UNIT TYPE CODE: 6200
BLOOD UNIT TYPE CODE: 6200
BLOOD UNIT TYPE: NORMAL
BLOOD UNIT TYPE: NORMAL
BUN SERPL-MCNC: 32 MG/DL (ref 6–20)
CALCIUM SERPL-MCNC: 8.2 MG/DL (ref 8.7–10.5)
CHLORIDE SERPL-SCNC: 103 MMOL/L (ref 95–110)
CO2 SERPL-SCNC: 27 MMOL/L (ref 23–29)
CODING SYSTEM: NORMAL
CODING SYSTEM: NORMAL
CREAT SERPL-MCNC: 3.6 MG/DL (ref 0.5–1.4)
DIFFERENTIAL METHOD: ABNORMAL
DISPENSE STATUS: NORMAL
DISPENSE STATUS: NORMAL
EOSINOPHIL # BLD AUTO: 0.1 K/UL (ref 0–0.5)
EOSINOPHIL NFR BLD: 1.2 % (ref 0–8)
ERYTHROCYTE [DISTWIDTH] IN BLOOD BY AUTOMATED COUNT: 15 % (ref 11.5–14.5)
EST. GFR  (NO RACE VARIABLE): 16.4 ML/MIN/1.73 M^2
GLUCOSE SERPL-MCNC: 83 MG/DL (ref 70–110)
GLUCOSE SERPL-MCNC: 83 MG/DL (ref 70–110)
GLUCOSE SERPL-MCNC: 86 MG/DL (ref 70–110)
HCT VFR BLD AUTO: 22.7 % (ref 37–48.5)
HGB BLD-MCNC: 7.8 G/DL (ref 12–16)
IMM GRANULOCYTES # BLD AUTO: 0.04 K/UL (ref 0–0.04)
IMM GRANULOCYTES NFR BLD AUTO: 0.5 % (ref 0–0.5)
LYMPHOCYTES # BLD AUTO: 0.9 K/UL (ref 1–4.8)
LYMPHOCYTES NFR BLD: 12.6 % (ref 18–48)
MAGNESIUM SERPL-MCNC: 1.9 MG/DL (ref 1.6–2.6)
MCH RBC QN AUTO: 29.2 PG (ref 27–31)
MCHC RBC AUTO-ENTMCNC: 34.4 G/DL (ref 32–36)
MCV RBC AUTO: 85 FL (ref 82–98)
MONOCYTES # BLD AUTO: 0.6 K/UL (ref 0.3–1)
MONOCYTES NFR BLD: 7.4 % (ref 4–15)
NEUTROPHILS # BLD AUTO: 5.8 K/UL (ref 1.8–7.7)
NEUTROPHILS NFR BLD: 78 % (ref 38–73)
NRBC BLD-RTO: 0 /100 WBC
NUM UNITS TRANS PACKED RBC: NORMAL
NUM UNITS TRANS PACKED RBC: NORMAL
PHOSPHATE SERPL-MCNC: 4 MG/DL (ref 2.7–4.5)
PLATELET # BLD AUTO: 87 K/UL (ref 150–450)
PMV BLD AUTO: 9.6 FL (ref 9.2–12.9)
POTASSIUM SERPL-SCNC: 4.1 MMOL/L (ref 3.5–5.1)
PROT SERPL-MCNC: 5 G/DL (ref 6–8.4)
RBC # BLD AUTO: 2.67 M/UL (ref 4–5.4)
SODIUM SERPL-SCNC: 140 MMOL/L (ref 136–145)
TROPONIN I SERPL DL<=0.01 NG/ML-MCNC: 0.06 NG/ML
WBC # BLD AUTO: 7.45 K/UL (ref 3.9–12.7)

## 2022-08-25 PROCEDURE — 45378 DIAGNOSTIC COLONOSCOPY: CPT | Performed by: INTERNAL MEDICINE

## 2022-08-25 PROCEDURE — 63600175 PHARM REV CODE 636 W HCPCS: Performed by: NURSE ANESTHETIST, CERTIFIED REGISTERED

## 2022-08-25 PROCEDURE — 25000003 PHARM REV CODE 250: Performed by: NURSE ANESTHETIST, CERTIFIED REGISTERED

## 2022-08-25 PROCEDURE — 36415 COLL VENOUS BLD VENIPUNCTURE: CPT | Performed by: NURSE PRACTITIONER

## 2022-08-25 PROCEDURE — 83735 ASSAY OF MAGNESIUM: CPT | Performed by: NURSE PRACTITIONER

## 2022-08-25 PROCEDURE — 21400001 HC TELEMETRY ROOM

## 2022-08-25 PROCEDURE — 80053 COMPREHEN METABOLIC PANEL: CPT | Performed by: HOSPITALIST

## 2022-08-25 PROCEDURE — 85025 COMPLETE CBC W/AUTO DIFF WBC: CPT | Performed by: NURSE PRACTITIONER

## 2022-08-25 PROCEDURE — 63600175 PHARM REV CODE 636 W HCPCS: Performed by: HOSPITALIST

## 2022-08-25 PROCEDURE — 90935 HEMODIALYSIS ONE EVALUATION: CPT

## 2022-08-25 PROCEDURE — 27100019 HC AMBU BAG ADULT/PED: Performed by: ANESTHESIOLOGY

## 2022-08-25 PROCEDURE — 84100 ASSAY OF PHOSPHORUS: CPT | Performed by: HOSPITALIST

## 2022-08-25 PROCEDURE — 25000003 PHARM REV CODE 250: Performed by: HOSPITALIST

## 2022-08-25 PROCEDURE — 25500020 PHARM REV CODE 255: Performed by: HOSPITALIST

## 2022-08-25 PROCEDURE — 84484 ASSAY OF TROPONIN QUANT: CPT | Performed by: HOSPITALIST

## 2022-08-25 PROCEDURE — P9016 RBC LEUKOCYTES REDUCED: HCPCS | Performed by: HOSPITALIST

## 2022-08-25 PROCEDURE — 27202103: Performed by: ANESTHESIOLOGY

## 2022-08-25 PROCEDURE — 25000003 PHARM REV CODE 250: Performed by: NURSE PRACTITIONER

## 2022-08-25 PROCEDURE — 37000009 HC ANESTHESIA EA ADD 15 MINS: Performed by: INTERNAL MEDICINE

## 2022-08-25 PROCEDURE — 37000008 HC ANESTHESIA 1ST 15 MINUTES: Performed by: INTERNAL MEDICINE

## 2022-08-25 PROCEDURE — 27000671 HC TUBING MICROBORE EXT: Performed by: ANESTHESIOLOGY

## 2022-08-25 PROCEDURE — 63600175 PHARM REV CODE 636 W HCPCS: Performed by: ANESTHESIOLOGY

## 2022-08-25 RX ORDER — FAMOTIDINE 20 MG/1
20 TABLET, FILM COATED ORAL EVERY OTHER DAY
Status: DISCONTINUED | OUTPATIENT
Start: 2022-08-26 | End: 2022-08-26 | Stop reason: HOSPADM

## 2022-08-25 RX ORDER — HYDROCODONE BITARTRATE AND ACETAMINOPHEN 500; 5 MG/1; MG/1
TABLET ORAL
Status: DISCONTINUED | OUTPATIENT
Start: 2022-08-25 | End: 2022-08-26

## 2022-08-25 RX ORDER — IODIXANOL 320 MG/ML
100 INJECTION, SOLUTION INTRAVASCULAR
Status: COMPLETED | OUTPATIENT
Start: 2022-08-25 | End: 2022-08-25

## 2022-08-25 RX ORDER — ONDANSETRON 2 MG/ML
INJECTION INTRAMUSCULAR; INTRAVENOUS
Status: COMPLETED | OUTPATIENT
Start: 2022-08-25 | End: 2022-08-25

## 2022-08-25 RX ORDER — PROPOFOL 10 MG/ML
VIAL (ML) INTRAVENOUS
Status: DISCONTINUED | OUTPATIENT
Start: 2022-08-25 | End: 2022-08-25

## 2022-08-25 RX ADMIN — LEVETIRACETAM 500 MG: 500 TABLET, FILM COATED ORAL at 08:08

## 2022-08-25 RX ADMIN — HYDROCODONE BITARTRATE AND ACETAMINOPHEN 1 TABLET: 5; 325 TABLET ORAL at 07:08

## 2022-08-25 RX ADMIN — ARIPIPRAZOLE 5 MG: 5 TABLET ORAL at 05:08

## 2022-08-25 RX ADMIN — SODIUM CHLORIDE: 0.9 INJECTION, SOLUTION INTRAVENOUS at 11:08

## 2022-08-25 RX ADMIN — HYDRALAZINE HYDROCHLORIDE 100 MG: 25 TABLET, FILM COATED ORAL at 09:08

## 2022-08-25 RX ADMIN — MORPHINE SULFATE 4 MG: 4 INJECTION, SOLUTION INTRAMUSCULAR; INTRAVENOUS at 05:08

## 2022-08-25 RX ADMIN — MORPHINE SULFATE 4 MG: 4 INJECTION, SOLUTION INTRAMUSCULAR; INTRAVENOUS at 08:08

## 2022-08-25 RX ADMIN — AMLODIPINE BESYLATE 10 MG: 5 TABLET ORAL at 05:08

## 2022-08-25 RX ADMIN — PANTOPRAZOLE SODIUM 40 MG: 40 TABLET, DELAYED RELEASE ORAL at 05:08

## 2022-08-25 RX ADMIN — PROPOFOL 20 MG: 10 INJECTION, EMULSION INTRAVENOUS at 11:08

## 2022-08-25 RX ADMIN — TAMSULOSIN HYDROCHLORIDE 0.8 MG: 0.4 CAPSULE ORAL at 05:08

## 2022-08-25 RX ADMIN — FLUOXETINE 20 MG: 20 CAPSULE ORAL at 05:08

## 2022-08-25 RX ADMIN — IODIXANOL 100 ML: 320 INJECTION, SOLUTION INTRAVASCULAR at 07:08

## 2022-08-25 RX ADMIN — HYDROCODONE BITARTRATE AND ACETAMINOPHEN 1 TABLET: 5; 325 TABLET ORAL at 12:08

## 2022-08-25 RX ADMIN — CARVEDILOL 25 MG: 25 TABLET, FILM COATED ORAL at 08:08

## 2022-08-25 RX ADMIN — PROPOFOL 50 MG: 10 INJECTION, EMULSION INTRAVENOUS at 11:08

## 2022-08-25 RX ADMIN — ONDANSETRON 4 MG: 2 INJECTION INTRAMUSCULAR; INTRAVENOUS at 11:08

## 2022-08-25 RX ADMIN — PROPOFOL 10 MG: 10 INJECTION, EMULSION INTRAVENOUS at 11:08

## 2022-08-25 RX ADMIN — ISOSORBIDE MONONITRATE 120 MG: 60 TABLET, EXTENDED RELEASE ORAL at 05:08

## 2022-08-25 RX ADMIN — HYDRALAZINE HYDROCHLORIDE 100 MG: 25 TABLET, FILM COATED ORAL at 05:08

## 2022-08-25 NOTE — CARE UPDATE
08/24/22 2237   Patient Assessment/Suction   Respiratory Effort Unlabored   Rhythm/Pattern, Respiratory pattern regular   PRE-TX-O2   O2 Device (Oxygen Therapy) nasal cannula   Flow (L/min) 2   SpO2 (!) 90 %   Pulse Oximetry Type Continuous   Pulse 82   Resp 16   ETCO2   ETCO2 (mmHg) 0 mmHg   Respiratory Evaluation   $ Care Plan Tech Time 15 min   $ Eval/Re-eval Charges Re-evaluation   Evaluation For   (CARE PLAN)

## 2022-08-25 NOTE — PROVATION PATIENT INSTRUCTIONS
Discharge Summary/Instructions after an Endoscopic Procedure  Patient Name: Tabby Howard  Patient MRN: [id number]  Patient YOB: 1990 Thursday, August 25, 2022  Jagdeep Cedeno MD  RESTRICTIONS:  During your procedure today, you received medications for sedation.  These   medications may affect your judgment, balance and coordination.  Therefore,   for 24 hours, you have the following restrictions:   - DO NOT drive a car, operate machinery, make legal/financial decisions,   sign important papers or drink alcohol.    ACTIVITY:  Today: no heavy lifting, straining or running due to procedural   sedation/anesthesia.  The following day: return to full activity including work.  DIET:  Eat and drink normally unless instructed otherwise.     TREATMENT FOR COMMON SIDE EFFECTS:  - Mild abdominal pain, nausea, belching, bloating or excessive gas:  rest,   eat lightly and use a heating pad.  - Sore Throat: treat with throat lozenges and/or gargle with warm salt   water.  - Because air was used during the procedure, expelling large amounts of air   from your rectum or belching is normal.  - If a bowel prep was taken, you may not have a bowel movement for 1-3 days.    This is normal.  SYMPTOMS TO WATCH FOR AND REPORT TO YOUR PHYSICIAN:  1. Abdominal pain or bloating, other than gas cramps.  2. Chest pain.  3. Back pain.  4. Signs of infection such as: chills or fever occurring within 24 hours   after the procedure.  5. Rectal bleeding, which would show as bright red, maroon, or black stools.   (A tablespoon of blood from the rectum is not serious, especially if   hemorrhoids are present.)  6. Vomiting.  7. Weakness or dizziness.  GO DIRECTLY TO THE NEAREST EMERGENCY ROOM IF YOU HAVE ANY OF THE FOLLOWING:      Difficulty breathing              Chills and/or fever over 101 F   Persistent vomiting and/or vomiting blood   Severe abdominal pain   Severe chest pain   Black, tarry stools   Bleeding- more than one  tablespoon   Any other symptom or condition that you feel may need urgent attention  Your doctor recommends these additional instructions:  If any biopsies were taken, your doctors clinic will contact you in 1 to 2   weeks with any results.  - Return patient to hospital [facility] for ongoing care.   - NPO today.   - Repeat colonoscopy at the next available appointment because the bowel   preparation was poor.  For questions, problems or results please call your physician - Jagdeep Cedeno MD at Work:  [phone].  [facility], EMERGENCY ROOM PHONE NUMBER: [phone]  IF A COMPLICATION OR EMERGENCY SITUATION ARISES AND YOU ARE UNABLE TO REACH   YOUR PHYSICIAN - GO DIRECTLY TO THE EMERGENCY ROOM.  MD Jagdeep Crandall MD  8/25/2022 11:24:22 AM  This report has been verified and signed electronically.  Dear patient,  As a result of recent federal legislation (The Federal Cures Act), you may   receive lab or pathology results from your procedure in your [facility]   account before your physician is able to contact you. Your physician or   their representative will relay the results to you with their   recommendations at their soonest availability.  Thank you,  PROVATION

## 2022-08-25 NOTE — PROGRESS NOTES
Consent verified. Lamra well. Net uf 2.1L. Education provided on infection prevention.     08/25/22 1620   Handoff Report   Received From Mi LINTON   Given To Christy LINTON   Vital Signs   Temp 98.2 °F (36.8 °C)   Temp src Oral   Pulse 86   Heart Rate Source Monitor   Resp 16   SpO2 97 %   BP (!) 181/90   Assessments (Pre/Post)   Blood Liters Processed (BLP) 43.8   Transport Modality bed   Level of Consciousness (AVPU) alert   Dialyzer Clearance mildly streaked   Pre-Hemodialysis Assessment   Patient Status Departed   Treatment Status Completed   Post-Hemodialysis Assessment   Rinseback Volume (mL) 250 mL   Blood Volume Processed (Liters) 43.8 L   Dialyzer Clearance Lightly streaked   Additional Fluid Intake (mL) 600 mL  (2 units PRBC)   Total UF (mL) 3200 mL   Net Fluid Removal 2100   Patient Response to Treatment lamar well   Post-Treatment Weight 68.6 kg (151 lb 3.8 oz)   Treatment Weight Change -2.1   Post-Hemodialysis Comments all blood returned, pt c/o stomach pain primary RN aware, pt stable at this time

## 2022-08-25 NOTE — PROGRESS NOTES
[facility] Medicine  Progress Note    Patient name: Tabby Howard  MRN: [id number]  Admit Date: 8/23/2022   LOS: 2 days     SUBJECTIVE:     Principal problem: Anemia    Interval History:  Patient was seen and examined bedside dialysis unit, she underwent colonoscopy today which was unremarkable. No hematemesis or melena while inpatient.  Received 2 more units PRBC with dialysis today    Scheduled Meds:   amLODIPine  10 mg Oral Daily    ARIPiprazole  5 mg Oral Daily    carvediloL  25 mg Oral BID    cloNIDine 0.2 mg/24 hr td ptwk  1 patch Transdermal Q7 Days    desvenlafaxine succinate  50 mg Oral Daily    [START ON 8/26/2022] famotidine  20 mg Oral Every other day    FLUoxetine  20 mg Oral Daily    furosemide (LASIX) injection  80 mg Intravenous Once    hydrALAZINE  100 mg Oral Q8H    isosorbide mononitrate  120 mg Oral Daily    levETIRAcetam  500 mg Oral BID    pantoprazole  40 mg Oral BID    polyethylene glycol  17 g Oral Daily    tamsulosin  0.8 mg Oral Daily     Continuous Infusions:  PRN Meds:sodium chloride, acetaminophen, bisacodyL, calcium chloride IVPB, calcium chloride IVPB, calcium chloride IVPB, dextrose 50%, dextrose 50%, glucagon (human recombinant), glucose, glucose, hydrALAZINE, HYDROcodone-acetaminophen, insulin aspart U-100, magnesium oxide, magnesium sulfate IVPB, magnesium sulfate IVPB, magnesium sulfate IVPB, magnesium sulfate IVPB, melatonin, morphine, naloxone, ondansetron, polyethylene glycol, potassium chloride in water, potassium chloride in water, potassium chloride in water, potassium chloride in water, potassium chloride, potassium chloride, potassium chloride, potassium chloride, senna-docusate 8.6-50 mg, simethicone, sodium chloride 0.9%, sodium phosphate IVPB, sodium phosphate IVPB, sodium phosphate IVPB, sodium phosphate IVPB, sodium phosphate IVPB    Review of patient's allergies indicates:   Allergen Reactions    Penicillins Hives       Review  of Systems: As per interval history    OBJECTIVE:     Vital Signs (Most Recent)  Temp: 98.2 °F (36.8 °C) (08/25/22 1535)  Pulse: 82 (08/25/22 1545)  Resp: 16 (08/25/22 1535)  BP: (!) 155/89 (08/25/22 1545)  SpO2: 97 % (08/25/22 1535)    Vital Signs Range (Last 24H):  Temp:  [97.2 °F (36.2 °C)-99.1 °F (37.3 °C)]   Pulse:  [80-87]   Resp:  [7-20]   BP: (103-176)/()   SpO2:  [90 %-100 %]     I & O (Last 24H):    Intake/Output Summary (Last 24 hours) at 8/25/2022 1610  Last data filed at 8/25/2022 1535  Gross per 24 hour   Intake 625 ml   Output --   Net 625 ml       Physical Exam:  General: Patient resting comfortably in no acute distress. Appears as stated age. Calm  Eyes: No conjunctival injection. No scleral icterus.  ENT: Hearing grossly intact. No discharge from ears. No nasal discharge.   Neck: Supple, trachea midline. No JVD  CVS: RRR. No LE edema BL  Lungs:  No tachypnea or accessory muscle use.  Clear to auscultation bilaterally  Abdomen:  Soft, nontender and nondistended.  No organomegaly  Neuro: AOx3. Moves all extremities. Follows commands. Responds appropriately   Skin:  No rash or erythema noted    Laboratory:  I have reviewed all pertinent lab results within the past 24 hours.    Diagnostic Results:  Reviewed all imaging    ASSESSMENT/PLAN:     32-year-old lady with recent cholecystectomy came from dialysis due to chest pain/epigastric pain found to have severe symptomatic anemia.   status post 5 units PRBC transfusions, underwent EGD and colonoscopy which did not show any significant findings     Active Hospital Problems    Diagnosis  POA    *Anemia [D64.9]  Yes    Chest pain [R07.9]  Yes    HTN (hypertension) [I10]  Yes    ESRD (end stage renal disease) [N18.6]  Yes    Seizure [R56.9]  Yes    Type II diabetes mellitus [E11.9]  Yes      Resolved Hospital Problems   No resolved problems to display.         Plan:   Status post 5(3+2) units PRBC transfusions, no apparent bleeding noted while  inpatient, no hematemesis or melena while inpatient  Status post EGD and colonoscopy which did not show any concerning findings  Considering her recent surgery we will obtain CT abdomen and pelvis with oral and IV contrast to look for any hematoma  PPI  Consult renal for dialysis need  Judicious use of analgesia  Continue current orders  P.r.n. hydralazine for blood pressure above 170 mmHg  Further management as per clinical course      VTE Risk Mitigation (From admission, onward)         Ordered     IP VTE HIGH RISK PATIENT  Once         08/23/22 1830     Place sequential compression device  Until discontinued         08/23/22 1830                    Department Hospital Medicine  Formerly [facility], [facility]  Amalia Jefferson MD  Date of service: 08/25/2022

## 2022-08-25 NOTE — NURSING
PATIENT CONTINUES TO PULL TELEMETRY LEADS OFF AND WILL NOT LEAVE THEM ON. TELEMETRY OFF AT THIS TIME.

## 2022-08-25 NOTE — PLAN OF CARE
[facility]  Initial Discharge Assessment       Primary Care Provider: [facility]    Admission Diagnosis: Chest pain, unspecified type [R07.9]  Symptomatic anemia [D64.9]    Admission Date: 8/23/2022  Expected Discharge Date:     Discharge Barriers Identified: None    Payor: MEDICAID / Plan: HEALTH[place]UE [place] / Product Type: Managed Medicaid /     Extended Emergency Contact Information  Primary Emergency Contact: Garcia Howard  Mobile Phone: [phone]  Relation: Father  Preferred language: English   needed? No  Secondary Emergency Contact: Tanya Howard  Mobile Phone: [phone]  Relation: Step parent  Preferred language: English   needed? No    Discharge Plan A: Home with family  Discharge Plan B: Home with family      [facility]  [address]  [facility] [address]  Phone: [phone] Fax: [phone]    SW met with Pt at bedside, and contacted father and step-mother in order to complete assessment.  Pt currently living with her Father and Step-mother at [address] [place] [address], [place]  [address].  She used a RW prior to admission in order to ambulate, and did not have any services at home.  Her father provides transportation to all medical appointments, and to dialysis three times a week at [facility].  Her prescriptions need to be filled prior to discharge if possible due to current insurance.  Case Management to continue to follow.    Initial Assessment (most recent)     Adult Discharge Assessment - 08/25/22 1810        Discharge Assessment    Assessment Type Discharge Planning Assessment     Confirmed/corrected address, phone number and insurance Yes     Confirmed Demographics Correct on Facesheet     Source of Information family;patient;health record     Communicated TATIANA with patient/caregiver Yes     Lives With parent(s)     Facility Arrived From: Home     Do you expect to return to your current  living situation? Yes     Prior to hospitilization cognitive status: Alert/Oriented     Current cognitive status: Alert/Oriented     Walking or Climbing Stairs Difficulty ambulation difficulty, requires equipment     Dressing/Bathing Difficulty none     Home Layout Able to live on 1st floor     Equipment Currently Used at Home walker, rolling     Readmission within 30 days? Yes     Patient currently being followed by outpatient case management? No     Do you currently have service(s) that help you manage your care at home? No     Do you take prescription medications? Yes     Do you have prescription coverage? Yes     Do you have any problems affording any of your prescribed medications? No     Is the patient taking medications as prescribed? yes     Who is going to help you get home at discharge? Garcia Howard (Father)   [phone]     How do you get to doctors appointments? family or friend will provide     Are you on dialysis? Yes     Dialysis Name and Scheduled days [facility], 11:00 AM     Do you take coumadin? No     Discharge Plan A Home with family     Discharge Plan B Home with family     DME Needed Upon Discharge  none     Discharge Plan discussed with: Parent(s)     Name(s) and Number(s) Garcia Howard (Father)   [phone]     Discharge Barriers Identified None        Relationship/Environment    Name(s) of Who Lives With Patient Garcia Howard (Father)   [phone]                 Readmission Assessment (most recent)     Readmission Assessment - 08/25/22 1815        Readmission    Why were you hospitalized in the last 30 days? Yes     Why were you readmitted? Alarmed about signs/symptoms     When you left the hospital how did you feel? Step-mother anf father reports she was feeling okay.     When you left the hospital where did you go? Home with Family     Did patient/caregiver refused recommended DC plan? No     Tell me about what happened between when you left the hospital and the day you  returned. Pt reportedly began repeatedly complaining about stomach pain.     When did you start not feeling well? Pt reportedly began repeatedly complaining about stomach pain about a week after discharge.     Did you try to manage your symptoms your self? Yes     Did you try to see or did see a doctor or nurse before you came? Yes     Were you seen? Yes     Did you have  a follow-up appointment on discharge? Yes   Pt followed by dialysis clinic staff 3 times per week.    Was this a planned readmission? No

## 2022-08-25 NOTE — BRIEF OP NOTE
Colon  -poor prep throughout.  Mucosa healthy.    -see procedure note  -h/o acute on chronic anemia post [facility]  -numerous ct scans over the past couple of years but if pain is progressive would recommend repeating CT abdomen with contrast to evaluate for evolving hematoma...

## 2022-08-25 NOTE — ANESTHESIA POSTPROCEDURE EVALUATION
Anesthesia Post Evaluation    Patient: Tabby Howard    Procedure(s) Performed: Procedure(s) (LRB):  COLONOSCOPY (N/A)    Final Anesthesia Type: MAC      Patient location during evaluation: GI PACU  Patient participation: Yes- Able to Participate  Level of consciousness: awake and alert  Post-procedure vital signs: reviewed and stable  Pain management: adequate  Airway patency: patent    PONV status at discharge: No PONV  Anesthetic complications: no      Cardiovascular status: stable  Respiratory status: unassisted  Hydration status: euvolemic  Follow-up not needed.          Vitals Value Taken Time   /92 08/25/22 1225   Temp 36.8 °C (98.2 °F) 08/25/22 1120   Pulse 84 08/25/22 1227   Resp 20 08/25/22 1200   SpO2 96 % 08/25/22 1227   Vitals shown include unvalidated device data.      Event Time   Out of Recovery 08/25/2022 12:00:00         Pain/Lien Score: Pain Rating Prior to Med Admin: 8 (8/25/2022  5:54 AM)  Pain Rating Post Med Admin: 7 (8/24/2022  9:30 PM)  Lien Score: 10 (8/25/2022 11:47 AM)

## 2022-08-25 NOTE — PROGRESS NOTES
Nephrology Consult Note        Patient Name: Tabby Howard  MRN: [id number]    Patient Class: IP- Inpatient   Admission Date: 2022  Length of Stay: 2 days  Date of Service: 2022    Attending Physician: Amalia Jefferson MD  Primary Care Provider: [facility]    Reason for Consult: esrd/anemia/htn/shpt/abdominal pain    SUBJECTIVE:     HPI: 32F with lap freedom 2022, HTN, ESRD on HD MWF, DM, transfusion dependent chronic anemia, CHF, DVT (Eliquis), sickle cell trait, gastroparesis presents with weakness and epig pain. No signs of bleeding, h/o NSAIDs. Reports compiance w/ blood thinner. Got pRBCs on admission for Hgb 4.7. CT Abd 2022 - gastritis; hematoma in GB fossa; volume overload; Nml Liver / panc. EGD  - gastritis. Renal consulted for dialysis.     VSS. Tolerated HD well. 3 PRBC transfuse yesterday. Issues with pain control overnight. EGD today.   VSS. HD today. Transfuse 2 PRBC with HD. EGD and colonoscopy unrevealing. Plan for CT with contrast ? Hematoma per GI.    Past Medical History:   Diagnosis Date    CKD (chronic kidney disease), stage IV 2022    Diabetes mellitus due to underlying condition with unspecified complications 2022    Gastroparesis 2022    Heart failure with preserved ejection fraction 2022    EF 55% on 3/22    History of gastroesophageal reflux (GERD)     History of supraventricular tachycardia     Hyperkalemia 2022    Hypertensive emergency 2022    Sickle cell trait 2022    Type 2 diabetes mellitus      Past Surgical History:   Procedure Laterality Date     SECTION      x 3    COLONOSCOPY      ESOPHAGOGASTRODUODENOSCOPY N/A 10/18/2019    Procedure: ESOPHAGOGASTRODUODENOSCOPY (EGD);  Surgeon: Gianluca Mendez MD;  Location: [facility];  Service: Endoscopy;  Laterality: N/A;    ESOPHAGOGASTRODUODENOSCOPY N/A 2022    Procedure: EGD (ESOPHAGOGASTRODUODENOSCOPY);  Surgeon:  Micky Paredes III, MD;  Location: [facility] ENDO;  Service: Endoscopy;  Laterality: N/A;    LAPAROSCOPIC CHOLECYSTECTOMY N/A 07/30/2022    Procedure: CHOLECYSTECTOMY, LAPAROSCOPIC;  Surgeon: Grey Preez MD;  Location: [facility] OR;  Service: General;  Laterality: N/A;    PLACEMENT OF DUAL-LUMEN VASCULAR CATHETER Left 07/12/2022    Procedure: INSERTION-CATHETER-JOSEPH;  Surgeon: Dionte Gan MD;  Location: [facility] OR;  Service: General;  Laterality: Left;    PLACEMENT OF DUAL-LUMEN VASCULAR CATHETER Right 07/26/2022    Procedure: INSERTION-CATHETER-Hemosplit;  Surgeon: Dionte Gan MD;  Location: [facility] OR;  Service: General;  Laterality: Right;     Family History   Problem Relation Age of Onset    Diabetes Mother     Diabetes Father      Social History     Tobacco Use    Smoking status: Never Smoker    Smokeless tobacco: Never Used   Substance Use Topics    Alcohol use: No    Drug use: No       Review of patient's allergies indicates:   Allergen Reactions    Penicillins Hives       Outpatient meds:  No current facility-administered medications on file prior to encounter.     Current Outpatient Medications on File Prior to Encounter   Medication Sig Dispense Refill    amLODIPine (NORVASC) 10 MG tablet Take 1 tablet (10 mg total) by mouth once daily. 30 tablet 11    apixaban (ELIQUIS) 5 mg Tab Take 1 tablet (5 mg total) by mouth 2 (two) times daily. For second month on. 60 tablet 2    carvediloL (COREG) 25 MG tablet Take 1 tablet (25 mg total) by mouth 2 (two) times daily. 60 tablet 2    desvenlafaxine succinate (PRISTIQ) 50 MG Tb24 Take 50 mg by mouth.      famotidine (PEPCID) 20 MG tablet Take 1 tablet (20 mg total) by mouth once daily. 30 tablet 0    FLUoxetine 20 MG capsule Take 1 capsule (20 mg total) by mouth once daily. 30 capsule 1    insulin aspart U-100 (NOVOLOG) 100 unit/mL (3 mL) InPn pen Inject 1-10 Units into the skin before meals and at bedtime as needed (Hyperglycemia). 3 mL 3     isosorbide mononitrate (IMDUR) 60 MG 24 hr tablet Take 2 tablets (120 mg total) by mouth once daily. 30 tablet 1    levETIRAcetam (KEPPRA) 500 MG Tab Take 1 tablet (500 mg total) by mouth 2 (two) times daily. 60 tablet 1    omeprazole (PRILOSEC) 20 MG capsule Take 2 capsules (40 mg total) by mouth once daily. for 10 days 20 capsule 0    ondansetron (ZOFRAN-ODT) 4 MG TbDL Take 1 tablet (4 mg total) by mouth every 8 (eight) hours as needed (nausea, vomiting). 12 tablet 0    sodium bicarbonate 650 MG tablet Take 1,300 mg by mouth 2 (two) times daily.      tamsulosin (FLOMAX) 0.4 mg Cap Take 0.8 mg by mouth once daily.      ARIPiprazole (ABILIFY) 5 MG Tab Take 5 mg by mouth once daily.      bisacodyL (DULCOLAX) 5 mg EC tablet Take 2 tablets (10 mg total) by mouth daily as needed for Constipation. (Patient not taking: Reported on 8/23/2022) 14 tablet 0    cloNIDine 0.2 mg/24 hr td ptwk (CATAPRES) 0.2 mg/24 hr Place 1 patch onto the skin every 7 days. (Patient not taking: Reported on 8/23/2022) 4 patch 2    furosemide (LASIX) 40 MG tablet Take 1 tablet (40 mg total) by mouth 2 (two) times daily. (Patient not taking: Reported on 8/23/2022) 60 tablet 0    hydrALAZINE (APRESOLINE) 100 MG tablet Take 1 tablet (100 mg total) by mouth every 8 (eight) hours. (Patient not taking: Reported on 8/23/2022) 90 tablet 2    LANTUS U-100 INSULIN 100 unit/mL injection Inject 5 Units into the skin once daily.      oxyCODONE-acetaminophen (PERCOCET) 5-325 mg per tablet Take 1 tablet by mouth every 6 (six) hours as needed for Pain. (Patient not taking: Reported on 8/23/2022) 20 tablet 0    polyethylene glycol (GLYCOLAX) 17 gram/dose powder Take 17 g by mouth once daily. (Patient not taking: Reported on 8/23/2022) 510 g 1    [DISCONTINUED] atenoloL (TENORMIN) 50 MG tablet Take 1 tablet (50 mg total) by mouth every other day. 45 tablet 3    [DISCONTINUED] pantoprazole (PROTONIX) 40 MG tablet Take 1 tablet (40 mg total) by mouth  once daily. 30 tablet 3    [DISCONTINUED] torsemide (DEMADEX) 20 MG Tab Take 1 tablet (20 mg total) by mouth 2 (two) times a day. (Patient taking differently: Take 20 mg by mouth once daily.) 60 tablet 1       Scheduled meds:   amLODIPine  10 mg Oral Daily    ARIPiprazole  5 mg Oral Daily    carvediloL  25 mg Oral BID    cloNIDine 0.2 mg/24 hr td ptwk  1 patch Transdermal Q7 Days    desvenlafaxine succinate  50 mg Oral Daily    [START ON 8/26/2022] famotidine  20 mg Oral Every other day    FLUoxetine  20 mg Oral Daily    furosemide (LASIX) injection  80 mg Intravenous Once    hydrALAZINE  100 mg Oral Q8H    isosorbide mononitrate  120 mg Oral Daily    levETIRAcetam  500 mg Oral BID    pantoprazole  40 mg Oral BID    polyethylene glycol  17 g Oral Daily    tamsulosin  0.8 mg Oral Daily       Infusions:      PRN meds:  sodium chloride, acetaminophen, bisacodyL, calcium chloride IVPB, calcium chloride IVPB, calcium chloride IVPB, dextrose 50%, dextrose 50%, glucagon (human recombinant), glucose, glucose, hydrALAZINE, HYDROcodone-acetaminophen, insulin aspart U-100, magnesium oxide, magnesium sulfate IVPB, magnesium sulfate IVPB, magnesium sulfate IVPB, magnesium sulfate IVPB, melatonin, morphine, naloxone, ondansetron, polyethylene glycol, potassium chloride in water, potassium chloride in water, potassium chloride in water, potassium chloride in water, potassium chloride, potassium chloride, potassium chloride, potassium chloride, senna-docusate 8.6-50 mg, simethicone, sodium chloride 0.9%, sodium phosphate IVPB, sodium phosphate IVPB, sodium phosphate IVPB, sodium phosphate IVPB, sodium phosphate IVPB    Review of Systems:    OBJECTIVE:     Vital Signs and IO (Last 24H):  Temp:  [97.2 °F (36.2 °C)-99.1 °F (37.3 °C)]   Pulse:  [80-87]   Resp:  [7-20]   BP: (103-161)/(58-99)   SpO2:  [90 %-100 %]   I/O last 3 completed shifts:  In: 1804 [Blood:604; Other:1200]  Out: 3200 [Other:3200]    Wt Readings from  Last 5 Encounters:   08/24/22 70.7 kg (155 lb 13.8 oz)   08/09/22 74.8 kg (165 lb)   07/30/22 74.8 kg (165 lb)   07/26/22 75.2 kg (165 lb 12.6 oz)   07/22/22 74.8 kg (165 lb)     Physical Exam:  Constitutional:       Appearance: She is well-developed. She is not diaphoretic.   HENT:      Head: Normocephalic and atraumatic.   Eyes:      General: No scleral icterus.     Pupils: Pupils are equal, round, and reactive to light.   Cardiovascular:      Rate and Rhythm: Normal rate and regular rhythm.   Pulmonary:      Effort: Pulmonary effort is normal. No respiratory distress.      Breath sounds: No stridor.   Abdominal:      General: There is no distension.      Palpations: Abdomen is soft.   Musculoskeletal:         General: No deformity. Normal range of motion.      Cervical back: Neck supple.   Skin:     General: Skin is warm and dry.      Findings: No erythema or rash.   Neurological:      Mental Status: She is alert and oriented to person, place, and time.      Cranial Nerves: No cranial nerve deficit.   Psychiatric:         Behavior: Behavior normal.     Body mass index is 28.5 kg/m².    Laboratory:  Recent Labs   Lab 08/23/22  1155 08/24/22  0218 08/24/22  1257 08/25/22  0325   * 135*  --  140   K 4.3 3.9  --  4.1   CL 97 100  --  103   CO2 28 27  --  27   BUN 39* 28*  --  32*   CREATININE 3.9* 3.0*  --  3.6*   * 70 81 83       Recent Labs   Lab 08/23/22  1155 08/24/22  0218 08/25/22  0325   CALCIUM 7.4* 7.8* 8.2*   ALBUMIN 2.7*  --  2.3*   PHOS  --   --  4.0   MG  --  2.1 1.9       Recent Labs   Lab 07/08/22  0516   PTH, Intact 289.8 H       No results for input(s): POCTGLUCOSE in the last 168 hours.    Recent Labs   Lab 05/15/22  1954 07/22/22  1653 08/24/22  0218   Hemoglobin A1C 5.8 H 6.0 H 6.2       Recent Labs   Lab 08/23/22  1155 08/24/22  0218 08/25/22  0325   WBC 9.99 11.61 7.45   HGB 4.7* 9.4* 7.8*   HCT 13.7* 26.8* 22.7*   PLT 90* 99* 87*   MCV 84 84 85   MCHC 34.3 35.1 34.4   MONO 8.8   0.9 7.9  0.9 7.4  0.6       Recent Labs   Lab 08/23/22  1155 08/25/22  0325   BILITOT 1.4* 1.5*   PROT 5.7* 5.0*   ALBUMIN 2.7* 2.3*   ALKPHOS 109 130   ALT 46* 58*   AST 78* 44*       Recent Labs   Lab 05/28/22  2036 06/11/22  1115 07/07/22  0912   Color, UA Yellow Yellow Yellow   Appearance, UA Clear Clear Clear   pH, UA 7.0 7.0 6.0   Specific [place], UA 1.020 1.015 1.020   Protein, UA 3+ A 3+ A 3+ A   Glucose, UA 2+ A 2+ A Negative   Ketones, UA Negative Negative Negative   Urobilinogen, UA Negative Negative Negative   Bilirubin (UA) Negative Negative Negative   Occult Blood UA 2+ A 1+ A 2+ A   Nitrite, UA Negative Negative Negative   RBC, UA 5 H 3 3   WBC, UA 1 4 8 H   Bacteria None Occasional Rare   Hyaline Casts, UA 0 0 0       Recent Labs   Lab 07/23/22  0558 07/23/22  1430 07/24/22  0445   POC PH 7.406 7.526 H 7.637 HH   POC PCO2 37.2 26.2 LL 26.0 LL   POC HCO3 23.4 L 21.7 L 27.8   POC PO2 80 55  H   POC SATURATED O2 96 92 L 99   POC BE -1 -1 7   Sample ARTERIAL ARTERIAL ARTERIAL       Microbiology Results (last 7 days)     ** No results found for the last 168 hours. **        ASSESSMENT/PLAN:     ESRD on HD MWF via catheter  Continue current dialysis prescription.  Next HD per schedule.  Renal diet - low K, low phos.  No IVs or BP checks on access and/or non-dominant arm.    Anemia of CKD  ? GIB  Hgb and HCT are acceptable. Monitor.  Will provide SHELLEY and/or IV iron PRN.  Transfused 3 PRBC on 8/22, 2 PRBC on 8/25.    MBD / Secondary HPT  Ca, phos, PTH and vitamin D levels are acceptable.   Phos binders, vitamin D analogues and calcimimetics as needed.    HTN  BP seem controlled.   Tolerate asymptomatic HTN up to -160.  Continue home meds.  Low sodium diet.    Abdominal pain  s/p lap freedom recently. Plan per GI and Surgery.    Thank you for allowing us to participate in the care of your patient!   We will follow the patient and provide recommendations as needed.    Patient care time was spent  personally by me on the following activities:   · Obtaining a history.  · Examination of patient.  · Providing medical care at the patients bedside.  · Developing a treatment plan with patient or surrogate and bedside caregivers.  · Ordering and reviewing laboratory studies, radiographic studies, pulse oximetry.  · Ordering and performing treatments and interventions.  · Evaluation of patient's response to treatment.  · Discussions with consultants while on the unit and immediately available to the patient.  · Re-evaluation of the patient's condition.  · Documentation in the medical record.     Mando Benitez MD    [facility]  [address] [place] [id number]    [phone] - tel  [phone] - fax    8/25/2022

## 2022-08-25 NOTE — ANESTHESIA PREPROCEDURE EVALUATION
08/25/2022  Tabby Howard is a 32 y.o., female.      Pre-op Assessment    I have reviewed the Patient Summary Reports.     I have reviewed the Nursing Notes. I have reviewed the NPO Status.   I have reviewed the Medications.     Review of Systems  Anesthesia Hx:  Denies Family Hx of Anesthesia complications.   Denies Personal Hx of Anesthesia complications.   Social:  Non-Smoker, No Alcohol Use    Hematology/Oncology:         -- Anemia: Hematology Comments: Transfuse 3 units of blood for hemoglobin of 4.7 on 08/23/2022. Sickle cell trait.  Thrombocytopenia.  Last Eliquis 08/22/2022.  History of DVT.    Cardiovascular:   Hypertension Dysrhythmias (History of SVT)  07/08/2022 echo shows 50% EF and moderate pericardial effusion   Pulmonary:   Oxygen saturation in the low 90s with oxygen per nasal cannula. Chest x-ray shows moderate cardiomegaly and scattered lower atelectasis.   Renal/:   Chronic Renal Disease (Last dialysis 08/23/2022), ESRD    Hepatic/GI:   GERD Gastroparesis. EGD 08/24/2022 was completely normal.  Recent cholecystectomy.  Recent abdominal pain and nausea.   Neurological:   Seizures ( Keppra)    Endocrine:   Diabetes, well controlled, type 2, using insulin        Physical Exam  General: Well nourished, Cooperative, Alert and Oriented    Airway:  Mallampati: III   Mouth Opening: Normal  TM Distance: > 6 cm  Tongue: Normal  Neck ROM: Normal ROM    Dental:  Intact    Chest/Lungs:  Normal Respiratory Rate, Rales, Basilar    Heart:  Rate: Normal  Rhythm: Regular Rhythm  Sounds: Normal        Anesthesia Plan  Type of Anesthesia, risks & benefits discussed:    Anesthesia Type: MAC  Intra-op Monitoring Plan: Standard ASA Monitors  Informed Consent: Informed consent signed with the Patient and all parties understand the risks and agree with anesthesia plan.  All questions answered.   ASA Score:  3  Anesthesia Plan Notes: POM mask    Ready For Surgery From Anesthesia Perspective.     .

## 2022-08-25 NOTE — CONSULTS
SW discussed consult with Pt's step-mother.  Will request psych consult from attending if appropriate.  Also discussed family following up with Dialysis SW for recommendations.  Will continue to follow.

## 2022-08-25 NOTE — TRANSFER OF CARE
"Anesthesia Transfer of Care Note    Patient: Tabby Howard    Procedure(s) Performed: Procedure(s) (LRB):  COLONOSCOPY (N/A)    Patient location: GI    Anesthesia Type: MAC    Transport from OR: Transported from OR on room air with adequate spontaneous ventilation    Post pain: adequate analgesia    Post assessment: no apparent anesthetic complications    Post vital signs: stable    Level of consciousness: awake and alert    Nausea/Vomiting: no nausea/vomiting    Complications: none    Transfer of care protocol was followed      Last vitals:   Visit Vitals  /81 (BP Location: Left arm, Patient Position: Lying)   Pulse 80   Temp 36.4 °C (97.5 °F) (Axillary)   Resp 18   Ht 5' 2.01" (1.575 m)   Wt 70.7 kg (155 lb 13.8 oz)   LMP 08/01/2022 (Approximate)   SpO2 99%   Breastfeeding No   BMI 28.50 kg/m²     "

## 2022-08-26 VITALS
WEIGHT: 154.31 LBS | HEIGHT: 62 IN | HEART RATE: 81 BPM | RESPIRATION RATE: 18 BRPM | DIASTOLIC BLOOD PRESSURE: 83 MMHG | SYSTOLIC BLOOD PRESSURE: 155 MMHG | TEMPERATURE: 98 F | OXYGEN SATURATION: 97 % | BODY MASS INDEX: 28.39 KG/M2

## 2022-08-26 PROBLEM — D64.9 ANEMIA: Status: RESOLVED | Noted: 2022-07-07 | Resolved: 2022-08-26

## 2022-08-26 PROBLEM — R07.9 CHEST PAIN: Status: RESOLVED | Noted: 2022-08-04 | Resolved: 2022-08-26

## 2022-08-26 LAB
ALBUMIN SERPL BCP-MCNC: 2.4 G/DL (ref 3.5–5.2)
ALP SERPL-CCNC: 124 U/L (ref 55–135)
ALT SERPL W/O P-5'-P-CCNC: 44 U/L (ref 10–44)
ANION GAP SERPL CALC-SCNC: 15 MMOL/L (ref 8–16)
AST SERPL-CCNC: 24 U/L (ref 10–40)
BASOPHILS # BLD AUTO: 0.02 K/UL (ref 0–0.2)
BASOPHILS NFR BLD: 0.3 % (ref 0–1.9)
BILIRUB SERPL-MCNC: 1.7 MG/DL (ref 0.1–1)
BUN SERPL-MCNC: 21 MG/DL (ref 6–20)
CALCIUM SERPL-MCNC: 8.1 MG/DL (ref 8.7–10.5)
CHLORIDE SERPL-SCNC: 106 MMOL/L (ref 95–110)
CO2 SERPL-SCNC: 16 MMOL/L (ref 23–29)
CREAT SERPL-MCNC: 3.5 MG/DL (ref 0.5–1.4)
DIFFERENTIAL METHOD: ABNORMAL
EOSINOPHIL # BLD AUTO: 0.2 K/UL (ref 0–0.5)
EOSINOPHIL NFR BLD: 2 % (ref 0–8)
ERYTHROCYTE [DISTWIDTH] IN BLOOD BY AUTOMATED COUNT: 15 % (ref 11.5–14.5)
EST. GFR  (NO RACE VARIABLE): 17 ML/MIN/1.73 M^2
GLUCOSE SERPL-MCNC: 85 MG/DL (ref 70–110)
GLUCOSE SERPL-MCNC: 97 MG/DL (ref 70–110)
HCT VFR BLD AUTO: 36.8 % (ref 37–48.5)
HGB BLD-MCNC: 11.8 G/DL (ref 12–16)
IMM GRANULOCYTES # BLD AUTO: 0.06 K/UL (ref 0–0.04)
IMM GRANULOCYTES NFR BLD AUTO: 0.8 % (ref 0–0.5)
LYMPHOCYTES # BLD AUTO: 1.5 K/UL (ref 1–4.8)
LYMPHOCYTES NFR BLD: 20.4 % (ref 18–48)
MAGNESIUM SERPL-MCNC: 2.1 MG/DL (ref 1.6–2.6)
MCH RBC QN AUTO: 29.6 PG (ref 27–31)
MCHC RBC AUTO-ENTMCNC: 32.1 G/DL (ref 32–36)
MCV RBC AUTO: 92 FL (ref 82–98)
MONOCYTES # BLD AUTO: 0.7 K/UL (ref 0.3–1)
MONOCYTES NFR BLD: 8.9 % (ref 4–15)
NEUTROPHILS # BLD AUTO: 5.1 K/UL (ref 1.8–7.7)
NEUTROPHILS NFR BLD: 67.6 % (ref 38–73)
NRBC BLD-RTO: 0 /100 WBC
PHOSPHATE SERPL-MCNC: 3.9 MG/DL (ref 2.7–4.5)
PLATELET # BLD AUTO: 71 K/UL (ref 150–450)
PMV BLD AUTO: 9.8 FL (ref 9.2–12.9)
POTASSIUM SERPL-SCNC: 4 MMOL/L (ref 3.5–5.1)
PROT SERPL-MCNC: 5.4 G/DL (ref 6–8.4)
RBC # BLD AUTO: 3.99 M/UL (ref 4–5.4)
SODIUM SERPL-SCNC: 137 MMOL/L (ref 136–145)
WBC # BLD AUTO: 7.49 K/UL (ref 3.9–12.7)

## 2022-08-26 PROCEDURE — 25000003 PHARM REV CODE 250: Performed by: HOSPITALIST

## 2022-08-26 PROCEDURE — 82962 GLUCOSE BLOOD TEST: CPT

## 2022-08-26 PROCEDURE — 85025 COMPLETE CBC W/AUTO DIFF WBC: CPT | Performed by: NURSE PRACTITIONER

## 2022-08-26 PROCEDURE — 63600175 PHARM REV CODE 636 W HCPCS: Performed by: HOSPITALIST

## 2022-08-26 PROCEDURE — 90935 HEMODIALYSIS ONE EVALUATION: CPT

## 2022-08-26 PROCEDURE — 80053 COMPREHEN METABOLIC PANEL: CPT | Performed by: HOSPITALIST

## 2022-08-26 PROCEDURE — 84100 ASSAY OF PHOSPHORUS: CPT | Performed by: HOSPITALIST

## 2022-08-26 PROCEDURE — 83735 ASSAY OF MAGNESIUM: CPT | Performed by: NURSE PRACTITIONER

## 2022-08-26 PROCEDURE — 36415 COLL VENOUS BLD VENIPUNCTURE: CPT | Performed by: NURSE PRACTITIONER

## 2022-08-26 PROCEDURE — 25000003 PHARM REV CODE 250: Performed by: NURSE PRACTITIONER

## 2022-08-26 RX ORDER — MUPIROCIN 20 MG/G
OINTMENT TOPICAL 2 TIMES DAILY
Status: DISCONTINUED | OUTPATIENT
Start: 2022-08-26 | End: 2022-08-26 | Stop reason: HOSPADM

## 2022-08-26 RX ORDER — SODIUM CHLORIDE 9 MG/ML
INJECTION, SOLUTION INTRAVENOUS ONCE
Status: DISCONTINUED | OUTPATIENT
Start: 2022-08-26 | End: 2022-08-26 | Stop reason: HOSPADM

## 2022-08-26 RX ORDER — PANTOPRAZOLE SODIUM 40 MG/1
40 TABLET, DELAYED RELEASE ORAL DAILY
Qty: 30 TABLET | Refills: 0 | Status: ON HOLD | OUTPATIENT
Start: 2022-08-26 | End: 2022-11-07 | Stop reason: HOSPADM

## 2022-08-26 RX ADMIN — FLUOXETINE 20 MG: 20 CAPSULE ORAL at 08:08

## 2022-08-26 RX ADMIN — CARVEDILOL 25 MG: 25 TABLET, FILM COATED ORAL at 08:08

## 2022-08-26 RX ADMIN — FAMOTIDINE 20 MG: 20 TABLET ORAL at 08:08

## 2022-08-26 RX ADMIN — MORPHINE SULFATE 4 MG: 4 INJECTION, SOLUTION INTRAMUSCULAR; INTRAVENOUS at 02:08

## 2022-08-26 RX ADMIN — MORPHINE SULFATE 4 MG: 4 INJECTION, SOLUTION INTRAMUSCULAR; INTRAVENOUS at 08:08

## 2022-08-26 RX ADMIN — PANTOPRAZOLE SODIUM 40 MG: 40 TABLET, DELAYED RELEASE ORAL at 05:08

## 2022-08-26 RX ADMIN — ISOSORBIDE MONONITRATE 120 MG: 60 TABLET, EXTENDED RELEASE ORAL at 08:08

## 2022-08-26 RX ADMIN — ARIPIPRAZOLE 5 MG: 5 TABLET ORAL at 08:08

## 2022-08-26 RX ADMIN — TAMSULOSIN HYDROCHLORIDE 0.8 MG: 0.4 CAPSULE ORAL at 08:08

## 2022-08-26 RX ADMIN — AMLODIPINE BESYLATE 10 MG: 5 TABLET ORAL at 08:08

## 2022-08-26 RX ADMIN — LEVETIRACETAM 500 MG: 500 TABLET, FILM COATED ORAL at 08:08

## 2022-08-26 NOTE — NURSING
Discharge instructions reviewed with pt. Questions answered. Copy of discharge instructions given to pt. Discharge home via wheelchair to vehicle. Accompanied by family.

## 2022-08-26 NOTE — ADDENDUM NOTE
Addendum  created 08/26/22 0958 by Jann Randall Jr., CRNA    Clinical Note Signed, Intraprocedure Blocks edited, SmartForm saved

## 2022-08-26 NOTE — CONSULTS
"[facility]  Adult Nutrition   Consult Note (Nutrition Education)     SUMMARY     Recommendations  1) RD has added Nepro with meals to assist in meeting pts nutritonal needs.   2) RD to educate pt on diet for CKD on HD verbally and give written information along with my contact information for dietary changes to improve her life.   3)  to obtain dietary meal preferences daily.    Goals:   1) Pt to meet 100% of her estimated energy and protein needs.   2) Pt to understand the diet information given to her to improve the quality of her life.    Nutrition Goal Status: goal not met  Communication of RD Recs: other (comment) (patient)    Dietitian Rounds Brief  Consult for Diet Education: Attempted to educate pt on diet for CKD on HD verbally but ended up leaving the written information along for her with my contact information. Pt is not eating for some reason...I can only assume because of her pain which is why I ordered the Nepro for her. Will continue to follow prn.      Diet order:   Current Diet Order: Renal      Evaluation of Received Nutrient/Fluid Intake  Energy Calories Required: not meeting needs  Protein Required: not meeting needs  Fluid Required: not meeting needs  Tolerance: not tolerating     % Intake of Estimated Energy Needs: 0%  % Meal Intake: 0      Intake/Output Summary (Last 24 hours) at 8/26/2022 1406  Last data filed at 8/26/2022 0900  Gross per 24 hour   Intake 1440 ml   Output 3200 ml   Net -1760 ml        Anthropometrics  Temp: 98.3 °F (36.8 °C)  Height Method: Stated  Height: 5' 2.01" (157.5 cm)  Height (inches): 62.01 in  Weight Method: Bed Scale  Weight: 70 kg (154 lb 5.2 oz)  Weight (lb): 154.32 lb  Ideal Body Weight (IBW), Female: 110.05 lb  BMI (Calculated): 28.2  BMI Grade: 25 - 29.9 - overweight       Estimated/Assessed Needs  Weight Used For Calorie Calculations: 70 kg (154 lb 5.2 oz)  Energy Calorie Requirements (kcal): 2869-9485 kcals/day (25-30 kcals/kg " ABW)  Energy Need Method: Kcal/kg  Protein Requirements: 60-75 g/day (1.2-1.5 g/kg IBW)  Weight Used For Protein Calculations: 50 kg (110 lb 3.7 oz)  Fluid Requirements (mL): UOP + 1000 mls  Estimated Fluid Requirement Method: RDA Method  RDA Method (mL): 1750       Reason for Assessment  Reason For Assessment: consult  Diagnosis: other (see comments) (anemia)  Relevant Medical History: Type 2 diabetes mellitus, History of supraventricular tachycardia, History of gastroesophageal reflux (GERD), Heart failure with preserved ejection fraction, EF 55%, Diabetes mellitus due to underlying condition with unspecified complications, Sickle cell trait, Gastroparesis, CKD (chronic kidney disease), stage IV, Hyperkalemia, Hypertensive emergency  Interdisciplinary Rounds: attended    Nutrition/Diet History  Spiritual, Cultural Beliefs, Religion Practices, Values that Affect Care: no  Food Allergies: NKFA  Factors Affecting Nutritional Intake: abdominal pain, pain    Nutrition Risk Screen  Nutrition Risk Screen: no indicators present     MST Score: 0  Have you recently lost weight without trying?: No  Weight loss score: 0  Have you been eating poorly because of a decreased appetite?: No  Appetite score: 0       Weight History:  Wt Readings from Last 5 Encounters:   08/26/22 70 kg (154 lb 5.2 oz)   08/09/22 74.8 kg (165 lb)   07/30/22 74.8 kg (165 lb)   07/26/22 75.2 kg (165 lb 12.6 oz)   07/22/22 74.8 kg (165 lb)        Lab/Procedures/Meds: Pertinent Labs/Meds Reviewed    Medications:Pertinent Medications Reviewed  Scheduled Meds:   sodium chloride 0.9%   Intravenous Once    amLODIPine  10 mg Oral Daily    ARIPiprazole  5 mg Oral Daily    carvediloL  25 mg Oral BID    cloNIDine 0.2 mg/24 hr td ptwk  1 patch Transdermal Q7 Days    desvenlafaxine succinate  50 mg Oral Daily    famotidine  20 mg Oral Every other day    FLUoxetine  20 mg Oral Daily    furosemide (LASIX) injection  80 mg Intravenous Once    hydrALAZINE   100 mg Oral Q8H    isosorbide mononitrate  120 mg Oral Daily    levETIRAcetam  500 mg Oral BID    mupirocin   Nasal BID    pantoprazole  40 mg Oral BID    polyethylene glycol  17 g Oral Daily    tamsulosin  0.8 mg Oral Daily     Continuous Infusions:  PRN Meds:.acetaminophen, bisacodyL, calcium chloride IVPB, calcium chloride IVPB, calcium chloride IVPB, dextrose 50%, dextrose 50%, glucagon (human recombinant), glucose, glucose, hydrALAZINE, HYDROcodone-acetaminophen, insulin aspart U-100, magnesium oxide, magnesium sulfate IVPB, magnesium sulfate IVPB, magnesium sulfate IVPB, magnesium sulfate IVPB, melatonin, naloxone, ondansetron, polyethylene glycol, potassium chloride in water, potassium chloride in water, potassium chloride in water, potassium chloride in water, potassium chloride, potassium chloride, potassium chloride, potassium chloride, senna-docusate 8.6-50 mg, simethicone, sodium chloride 0.9%, sodium chloride 0.9%, sodium phosphate IVPB, sodium phosphate IVPB, sodium phosphate IVPB, sodium phosphate IVPB, sodium phosphate IVPB    Labs: Pertinent Labs Reviewed  Clinical Chemistry:  Recent Labs   Lab 08/25/22  0325 08/26/22  0504 08/26/22  0847    137  --    K 4.1 4.0  --     106  --    CO2 27 16*  --    GLU 83 85  --    BUN 32* 21*  --    CREATININE 3.6* 3.5*  --    CALCIUM 8.2* 8.1*  --    PROT 5.0* 5.4*  --    ALBUMIN 2.3* 2.4*  --    BILITOT 1.5* 1.7*  --    ALKPHOS 130 124  --    AST 44* 24  --    ALT 58* 44  --    ANIONGAP 10 15  --    MG 1.9  --  2.1   PHOS 4.0  --  3.9     CBC:   Recent Labs   Lab 08/26/22  0504   WBC 7.49   RBC 3.99*   HGB 11.8*   HCT 36.8*   PLT 71*   MCV 92   MCH 29.6   MCHC 32.1     Cardiac Profile:  Recent Labs   Lab 08/23/22  1155 08/24/22  0218 08/25/22  0325   BNP >4,500*  --   --    TROPONINI 0.041* 0.102* 0.057*     Diabetes:  Recent Labs   Lab 08/24/22  0218   HGBA1C 6.2     Monitor and Evaluation  Food and Nutrient Intake: energy intake, food and  beverage intake  Food and Nutrient Adminstration: diet order  Knowledge/Beliefs/Attitudes: food and nutrition knowledge/skill, beliefs and attitudes  Physical Activity and Function: nutrition-related ADLs and IADLs, factors affecting access to physical activity  Anthropometric Measurements: weight, weight change, body mass index  Biochemical Data, Medical Tests and Procedures: lipid profile, inflammatory profile, glucose/endocrine profile, gastrointestinal profile, electrolyte and renal panel  Nutrition-Focused Physical Findings: overall appearance     Nutrition Risk  Level of Risk/Frequency of Follow-up: high     Nutrition Follow-Up  RD Follow-up?: Yes      Nancie Mcgee RD 08/26/2022 2:06 PM

## 2022-08-26 NOTE — PLAN OF CARE
08/26/22 1511   Final Note   Assessment Type Final Discharge Note   Anticipated Discharge Disposition Home   What phone number can be called within the next 1-3 days to see how you are doing after discharge? [phone]   Hospital Resources/Appts/Education Provided Provided patient/caregiver with written discharge plan information;Dilaysis schedule provided   Post-Acute Status   Discharge Delays None known at this time     Pt is discharging home with no identified CM needs. Pt has instructions to continue dialysis treatment after discharge, continuing on the same schedule she was on before with Efrem Guzman (MWF). Pt is clear to discharge from a CM standpoint.     1700 update: SW was able to get in touch with Pt's parents regarding a ride home. Initially Pt advised nurse at 1600 that she did not have a ride and needed us to arrange one. Pt's father, Garica, [phone] advised that he was on his way to get her and he was told he could either come to her room or just call the nurse's station and we would bring her down to the main entrance.

## 2022-08-26 NOTE — PROGRESS NOTES
HD: consent and hepatitis status confirmed prior to HD, pt stable, pt educated on infection prevention, tx completed, lines flushed, clamped, and capped, dressing changed, NET UF: 3L. Report called to Crissy.

## 2022-08-26 NOTE — NURSING
Attempted to contact family members on file several times via telephone. No answer each time. WAI/WILLIAM notified as well as MD.     3232-Pts father, Garcia, called nurses station back and stated he would be here to pick pt up.

## 2022-08-26 NOTE — PROGRESS NOTES
Nephrology Consult Note        Patient Name: Tabby Howard  MRN: [id number]    Patient Class: IP- Inpatient   Admission Date: 2022  Length of Stay: 3 days  Date of Service: 2022    Attending Physician: Amalia Jefferson MD  Primary Care Provider: [facility]    Reason for Consult: esrd/anemia/htn/shpt/abdominal pain    SUBJECTIVE:     HPI: 32F with lap freedom 2022, HTN, ESRD on HD MWF, DM, transfusion dependent chronic anemia, CHF, DVT (Eliquis), sickle cell trait, gastroparesis presents with weakness and epig pain. No signs of bleeding, h/o NSAIDs. Reports compiance w/ blood thinner. Got pRBCs on admission for Hgb 4.7. CT Abd 2022 - gastritis; hematoma in GB fossa; volume overload; Nml Liver / panc. EGD  - gastritis. Renal consulted for dialysis.     VSS. Tolerated HD well. 3 PRBC transfuse yesterday. Issues with pain control overnight. EGD today.   VSS. HD today. Transfuse 2 PRBC with HD. EGD and colonoscopy unrevealing. Plan for CT with contrast ? Hematoma per GI.   VSS. Spit her Norco today per nursing note, demanding Morphine. HD today per schedule. Had blood yesterday with HD. CT abdomien with ? resolving biloma/hematoma in anna hepatis - not sure if can explain her pain. OK to dc from renal stand point.    Past Medical History:   Diagnosis Date    CKD (chronic kidney disease), stage IV 2022    Diabetes mellitus due to underlying condition with unspecified complications 2022    Gastroparesis 2022    Heart failure with preserved ejection fraction 2022    EF 55% on 3/22    History of gastroesophageal reflux (GERD)     History of supraventricular tachycardia     Hyperkalemia 2022    Hypertensive emergency 2022    Sickle cell trait 2022    Type 2 diabetes mellitus      Past Surgical History:   Procedure Laterality Date     SECTION      x 3    COLONOSCOPY      COLONOSCOPY N/A 2022     Procedure: COLONOSCOPY;  Surgeon: Jagdeep Cedeno MD;  Location: [facility] ENDO;  Service: Endoscopy;  Laterality: N/A;    ESOPHAGOGASTRODUODENOSCOPY N/A 10/18/2019    Procedure: ESOPHAGOGASTRODUODENOSCOPY (EGD);  Surgeon: Gianluca Mendez MD;  Location: [facility] ENDO;  Service: Endoscopy;  Laterality: N/A;    ESOPHAGOGASTRODUODENOSCOPY N/A 08/24/2022    Procedure: EGD (ESOPHAGOGASTRODUODENOSCOPY);  Surgeon: Micky Paredes III, MD;  Location: [facility] ENDO;  Service: Endoscopy;  Laterality: N/A;    LAPAROSCOPIC CHOLECYSTECTOMY N/A 07/30/2022    Procedure: CHOLECYSTECTOMY, LAPAROSCOPIC;  Surgeon: Grey Perez MD;  Location: [facility] OR;  Service: General;  Laterality: N/A;    PLACEMENT OF DUAL-LUMEN VASCULAR CATHETER Left 07/12/2022    Procedure: INSERTION-CATHETER-JOSEPH;  Surgeon: Dionte Gan MD;  Location: [facility] OR;  Service: General;  Laterality: Left;    PLACEMENT OF DUAL-LUMEN VASCULAR CATHETER Right 07/26/2022    Procedure: INSERTION-CATHETER-Hemosplit;  Surgeon: Dionte Gan MD;  Location: [facility] OR;  Service: General;  Laterality: Right;     Family History   Problem Relation Age of Onset    Diabetes Mother     Diabetes Father      Social History     Tobacco Use    Smoking status: Never Smoker    Smokeless tobacco: Never Used   Substance Use Topics    Alcohol use: No    Drug use: No       Review of patient's allergies indicates:   Allergen Reactions    Penicillins Hives       Outpatient meds:  No current facility-administered medications on file prior to encounter.     Current Outpatient Medications on File Prior to Encounter   Medication Sig Dispense Refill    amLODIPine (NORVASC) 10 MG tablet Take 1 tablet (10 mg total) by mouth once daily. 30 tablet 11    apixaban (ELIQUIS) 5 mg Tab Take 1 tablet (5 mg total) by mouth 2 (two) times daily. For second month on. 60 tablet 2    carvediloL (COREG) 25 MG tablet Take 1 tablet (25 mg total) by mouth 2 (two) times daily. 60 tablet 2    desvenlafaxine  succinate (PRISTIQ) 50 MG Tb24 Take 50 mg by mouth.      famotidine (PEPCID) 20 MG tablet Take 1 tablet (20 mg total) by mouth once daily. 30 tablet 0    FLUoxetine 20 MG capsule Take 1 capsule (20 mg total) by mouth once daily. 30 capsule 1    insulin aspart U-100 (NOVOLOG) 100 unit/mL (3 mL) InPn pen Inject 1-10 Units into the skin before meals and at bedtime as needed (Hyperglycemia). 3 mL 3    isosorbide mononitrate (IMDUR) 60 MG 24 hr tablet Take 2 tablets (120 mg total) by mouth once daily. 30 tablet 1    levETIRAcetam (KEPPRA) 500 MG Tab Take 1 tablet (500 mg total) by mouth 2 (two) times daily. 60 tablet 1    omeprazole (PRILOSEC) 20 MG capsule Take 2 capsules (40 mg total) by mouth once daily. for 10 days 20 capsule 0    ondansetron (ZOFRAN-ODT) 4 MG TbDL Take 1 tablet (4 mg total) by mouth every 8 (eight) hours as needed (nausea, vomiting). 12 tablet 0    sodium bicarbonate 650 MG tablet Take 1,300 mg by mouth 2 (two) times daily.      tamsulosin (FLOMAX) 0.4 mg Cap Take 0.8 mg by mouth once daily.      ARIPiprazole (ABILIFY) 5 MG Tab Take 5 mg by mouth once daily.      bisacodyL (DULCOLAX) 5 mg EC tablet Take 2 tablets (10 mg total) by mouth daily as needed for Constipation. (Patient not taking: Reported on 8/23/2022) 14 tablet 0    cloNIDine 0.2 mg/24 hr td ptwk (CATAPRES) 0.2 mg/24 hr Place 1 patch onto the skin every 7 days. (Patient not taking: Reported on 8/23/2022) 4 patch 2    furosemide (LASIX) 40 MG tablet Take 1 tablet (40 mg total) by mouth 2 (two) times daily. (Patient not taking: Reported on 8/23/2022) 60 tablet 0    hydrALAZINE (APRESOLINE) 100 MG tablet Take 1 tablet (100 mg total) by mouth every 8 (eight) hours. (Patient not taking: Reported on 8/23/2022) 90 tablet 2    LANTUS U-100 INSULIN 100 unit/mL injection Inject 5 Units into the skin once daily.      oxyCODONE-acetaminophen (PERCOCET) 5-325 mg per tablet Take 1 tablet by mouth every 6 (six) hours as needed for Pain.  (Patient not taking: Reported on 8/23/2022) 20 tablet 0    polyethylene glycol (GLYCOLAX) 17 gram/dose powder Take 17 g by mouth once daily. (Patient not taking: Reported on 8/23/2022) 510 g 1    [DISCONTINUED] atenoloL (TENORMIN) 50 MG tablet Take 1 tablet (50 mg total) by mouth every other day. 45 tablet 3    [DISCONTINUED] pantoprazole (PROTONIX) 40 MG tablet Take 1 tablet (40 mg total) by mouth once daily. 30 tablet 3    [DISCONTINUED] torsemide (DEMADEX) 20 MG Tab Take 1 tablet (20 mg total) by mouth 2 (two) times a day. (Patient taking differently: Take 20 mg by mouth once daily.) 60 tablet 1       Scheduled meds:   amLODIPine  10 mg Oral Daily    ARIPiprazole  5 mg Oral Daily    carvediloL  25 mg Oral BID    cloNIDine 0.2 mg/24 hr td ptwk  1 patch Transdermal Q7 Days    desvenlafaxine succinate  50 mg Oral Daily    famotidine  20 mg Oral Every other day    FLUoxetine  20 mg Oral Daily    furosemide (LASIX) injection  80 mg Intravenous Once    hydrALAZINE  100 mg Oral Q8H    isosorbide mononitrate  120 mg Oral Daily    levETIRAcetam  500 mg Oral BID    pantoprazole  40 mg Oral BID    polyethylene glycol  17 g Oral Daily    tamsulosin  0.8 mg Oral Daily       Infusions:      PRN meds:  sodium chloride, acetaminophen, bisacodyL, calcium chloride IVPB, calcium chloride IVPB, calcium chloride IVPB, dextrose 50%, dextrose 50%, glucagon (human recombinant), glucose, glucose, hydrALAZINE, HYDROcodone-acetaminophen, insulin aspart U-100, magnesium oxide, magnesium sulfate IVPB, magnesium sulfate IVPB, magnesium sulfate IVPB, magnesium sulfate IVPB, melatonin, morphine, naloxone, ondansetron, polyethylene glycol, potassium chloride in water, potassium chloride in water, potassium chloride in water, potassium chloride in water, potassium chloride, potassium chloride, potassium chloride, potassium chloride, senna-docusate 8.6-50 mg, simethicone, sodium chloride 0.9%, sodium phosphate IVPB, sodium  phosphate IVPB, sodium phosphate IVPB, sodium phosphate IVPB, sodium phosphate IVPB    Review of Systems:    OBJECTIVE:     Vital Signs and IO (Last 24H):  Temp:  [97.5 °F (36.4 °C)-98.7 °F (37.1 °C)]   Pulse:  [81-86]   Resp:  [12-23]   BP: (117-181)/()   SpO2:  [89 %-98 %]   I/O last 3 completed shifts:  In: 1345 [P.O.:120; Blood:600; Other:600; IV Piggyback:25]  Out: 3200 [Other:3200]    Wt Readings from Last 5 Encounters:   08/26/22 70 kg (154 lb 5.2 oz)   08/09/22 74.8 kg (165 lb)   07/30/22 74.8 kg (165 lb)   07/26/22 75.2 kg (165 lb 12.6 oz)   07/22/22 74.8 kg (165 lb)     Physical Exam:  Constitutional:       Appearance: She is well-developed. She is not diaphoretic.   HENT:      Head: Normocephalic and atraumatic.   Eyes:      General: No scleral icterus.     Pupils: Pupils are equal, round, and reactive to light.   Cardiovascular:      Rate and Rhythm: Normal rate and regular rhythm.   Pulmonary:      Effort: Pulmonary effort is normal. No respiratory distress.      Breath sounds: No stridor.   Abdominal:      General: There is no distension.      Palpations: Abdomen is soft.   Musculoskeletal:         General: No deformity. Normal range of motion.      Cervical back: Neck supple.   Skin:     General: Skin is warm and dry.      Findings: No erythema or rash.   Neurological:      Mental Status: She is alert and oriented to person, place, and time.      Cranial Nerves: No cranial nerve deficit.   Psychiatric:         Behavior: Behavior normal.     Body mass index is 28.22 kg/m².    Laboratory:  Recent Labs   Lab 08/24/22  0218 08/24/22  1257 08/25/22  0325 08/26/22  0504   *  --  140 137   K 3.9  --  4.1 4.0     --  103 106   CO2 27  --  27 16*   BUN 28*  --  32* 21*   CREATININE 3.0*  --  3.6* 3.5*   GLU 70 81 83 85       Recent Labs   Lab 08/23/22  1155 08/24/22  0218 08/25/22  0325 08/26/22  0504 08/26/22  0847   CALCIUM 7.4* 7.8* 8.2* 8.1*  --    ALBUMIN 2.7*  --  2.3* 2.4*  --    PHOS   --   --  4.0  --  3.9   MG  --  2.1 1.9  --  2.1       Recent Labs   Lab 07/08/22  0516   PTH, Intact 289.8 H       No results for input(s): POCTGLUCOSE in the last 168 hours.    Recent Labs   Lab 05/15/22  1954 07/22/22  1653 08/24/22  0218   Hemoglobin A1C 5.8 H 6.0 H 6.2       Recent Labs   Lab 08/24/22  0218 08/25/22  0325 08/26/22  0504   WBC 11.61 7.45 7.49   HGB 9.4* 7.8* 11.8*   HCT 26.8* 22.7* 36.8*   PLT 99* 87* 71*   MCV 84 85 92   MCHC 35.1 34.4 32.1   MONO 7.9  0.9 7.4  0.6 8.9  0.7       Recent Labs   Lab 08/23/22  1155 08/25/22  0325 08/26/22  0504   BILITOT 1.4* 1.5* 1.7*   PROT 5.7* 5.0* 5.4*   ALBUMIN 2.7* 2.3* 2.4*   ALKPHOS 109 130 124   ALT 46* 58* 44   AST 78* 44* 24       Recent Labs   Lab 05/28/22  2036 06/11/22  1115 07/07/22  0912   Color, UA Yellow Yellow Yellow   Appearance, UA Clear Clear Clear   pH, UA 7.0 7.0 6.0   Specific [place], UA 1.020 1.015 1.020   Protein, UA 3+ A 3+ A 3+ A   Glucose, UA 2+ A 2+ A Negative   Ketones, UA Negative Negative Negative   Urobilinogen, UA Negative Negative Negative   Bilirubin (UA) Negative Negative Negative   Occult Blood UA 2+ A 1+ A 2+ A   Nitrite, UA Negative Negative Negative   RBC, UA 5 H 3 3   WBC, UA 1 4 8 H   Bacteria None Occasional Rare   Hyaline Casts, UA 0 0 0       Recent Labs   Lab 07/23/22  0558 07/23/22  1430 07/24/22  0445   POC PH 7.406 7.526 H 7.637 HH   POC PCO2 37.2 26.2 LL 26.0 LL   POC HCO3 23.4 L 21.7 L 27.8   POC PO2 80 55  H   POC SATURATED O2 96 92 L 99   POC BE -1 -1 7   Sample ARTERIAL ARTERIAL ARTERIAL       Microbiology Results (last 7 days)     ** No results found for the last 168 hours. **        ASSESSMENT/PLAN:     ESRD on HD MWF via catheter  Continue current dialysis prescription.  Next HD per schedule.  Renal diet - low K, low phos.  No IVs or BP checks on access and/or non-dominant arm.    Anemia of CKD  Hgb and HCT are acceptable. Monitor.  Will provide SHELLEY and/or IV iron PRN.  Transfused 3 PRBC on  8/22, 2 PRBC on 8/25.  EGD/colonoscopy negative. ? Resolving biloma/hepatoma at anna hepatis on CT 8/25    MBD / Secondary HPT  Ca, phos, PTH and vitamin D levels are acceptable.   Phos binders, vitamin D analogues and calcimimetics as needed.    HTN  BP seem controlled.   Tolerate asymptomatic HTN up to -160.  Continue home meds.  Low sodium diet.    Abdominal pain  s/p lap freedom recently. Plan per GI and Surgery.    Thank you for allowing us to participate in the care of your patient!   We will follow the patient and provide recommendations as needed.    Patient care time was spent personally by me on the following activities:   · Obtaining a history.  · Examination of patient.  · Providing medical care at the patients bedside.  · Developing a treatment plan with patient or surrogate and bedside caregivers.  · Ordering and reviewing laboratory studies, radiographic studies, pulse oximetry.  · Ordering and performing treatments and interventions.  · Evaluation of patient's response to treatment.  · Discussions with consultants while on the unit and immediately available to the patient.  · Re-evaluation of the patient's condition.  · Documentation in the medical record.     Mando Benitez MD    [facility]  [address] [place] [address]  [place] [address]    [phone] - tel  [phone] - fax    8/26/2022

## 2022-08-26 NOTE — ADDENDUM NOTE
Addendum  created 08/26/22 0955 by Jann Randall Jr., CRNA    Clinical Note Signed, Intraprocedure Blocks edited, SmartForm saved

## 2022-08-26 NOTE — PROGRESS NOTES
[facility] Medicine  Progress Note    Patient name: Tabby Howard  MRN: [id number]  Admit Date: 8/23/2022   LOS: 3 days     SUBJECTIVE:     Principal problem: Anemia    Interval History:  Patient was seen and examined bedside.  No complaints, she keeps requesting for IV pain medications.  No other acute events overnight as per nursing staff    Scheduled Meds:   sodium chloride 0.9%   Intravenous Once    amLODIPine  10 mg Oral Daily    ARIPiprazole  5 mg Oral Daily    carvediloL  25 mg Oral BID    cloNIDine 0.2 mg/24 hr td ptwk  1 patch Transdermal Q7 Days    desvenlafaxine succinate  50 mg Oral Daily    famotidine  20 mg Oral Every other day    FLUoxetine  20 mg Oral Daily    furosemide (LASIX) injection  80 mg Intravenous Once    hydrALAZINE  100 mg Oral Q8H    isosorbide mononitrate  120 mg Oral Daily    levETIRAcetam  500 mg Oral BID    mupirocin   Nasal BID    pantoprazole  40 mg Oral BID    polyethylene glycol  17 g Oral Daily    tamsulosin  0.8 mg Oral Daily     Continuous Infusions:  PRN Meds:acetaminophen, bisacodyL, calcium chloride IVPB, calcium chloride IVPB, calcium chloride IVPB, dextrose 50%, dextrose 50%, glucagon (human recombinant), glucose, glucose, hydrALAZINE, HYDROcodone-acetaminophen, insulin aspart U-100, magnesium oxide, magnesium sulfate IVPB, magnesium sulfate IVPB, magnesium sulfate IVPB, magnesium sulfate IVPB, melatonin, naloxone, ondansetron, polyethylene glycol, potassium chloride in water, potassium chloride in water, potassium chloride in water, potassium chloride in water, potassium chloride, potassium chloride, potassium chloride, potassium chloride, senna-docusate 8.6-50 mg, simethicone, sodium chloride 0.9%, sodium chloride 0.9%, sodium phosphate IVPB, sodium phosphate IVPB, sodium phosphate IVPB, sodium phosphate IVPB, sodium phosphate IVPB    Review of patient's allergies indicates:   Allergen Reactions    Penicillins Hives       Review  of Systems: As per interval history    OBJECTIVE:     Vital Signs (Most Recent)  Temp: 98.3 °F (36.8 °C) (08/26/22 1445)  Pulse: 81 (08/26/22 1445)  Resp: 18 (08/26/22 1230)  BP: (!) 155/83 (08/26/22 1445)  SpO2: 97 % (08/26/22 1445)    Vital Signs Range (Last 24H):  Temp:  [97.5 °F (36.4 °C)-98.7 °F (37.1 °C)]   Pulse:  [81-86]   Resp:  [12-23]   BP: (117-181)/(62-95)   SpO2:  [89 %-97 %]     I & O (Last 24H):    Intake/Output Summary (Last 24 hours) at 8/26/2022 1611  Last data filed at 8/26/2022 1445  Gross per 24 hour   Intake 1340 ml   Output 6700 ml   Net -5360 ml       Physical Exam:  General: Patient resting comfortably in no acute distress. Appears as stated age. Calm  Eyes: No conjunctival injection. No scleral icterus.  ENT: Hearing grossly intact. No discharge from ears. No nasal discharge.   Neck: Supple, trachea midline. No JVD  CVS: RRR. No LE edema BL  Lungs:  No tachypnea or accessory muscle use.  Clear to auscultation bilaterally  Abdomen:  Soft, nontender and nondistended.  No organomegaly  Neuro: AOx3. Moves all extremities. Follows commands. Responds appropriately   Skin:  No rash or erythema noted    Laboratory:  I have reviewed all pertinent lab results within the past 24 hours.    Diagnostic Results:  Reviewed all imaging    ASSESSMENT/PLAN:     32-year-old lady with recent cholecystectomy came from dialysis due to chest pain/epigastric pain found to have severe symptomatic anemia.   status post 5 units PRBC transfusions, underwent EGD and colonoscopy which did not show any significant findings     Active Hospital Problems    Diagnosis  POA    HTN (hypertension) [I10]  Yes    ESRD (end stage renal disease) [N18.6]  Yes    Seizure [R56.9]  Yes    Type II diabetes mellitus [E11.9]  Yes      Resolved Hospital Problems    Diagnosis Date Resolved POA    *Anemia [D64.9] 08/26/2022 Yes    Chest pain [R07.9] 08/26/2022 Yes         Plan:   Status post 5(3+2) units PRBC transfusions, no apparent  bleeding noted while inpatient, no hematemesis or melena while inpatient  Status post EGD and colonoscopy which did not show any concerning findings  Considering her recent surgery we obtained CT abdomen and pelvis with oral and IV contrast which did not show any significant findings except some postsurgical changes  PPI  Consult renal for dialysis need  Judicious use of analgesia  Continue current orders  P.r.n. hydralazine for blood pressure above 170 mmHg  Further management as per clinical course  Likely discharge home later in the day after her dialysis      VTE Risk Mitigation (From admission, onward)         Ordered     IP VTE HIGH RISK PATIENT  Once         08/23/22 1830     Place sequential compression device  Until discontinued         08/23/22 1830                    Department Hospital Medicine  [facility]  Amalia Jefferson MD  Date of service: 08/26/2022

## 2022-08-26 NOTE — NURSING
"Patient refused to take PO Norco this AM- pt demanded to be given Morphine. Pt crying and was inconsolable until nurse agreed to give pt Morphine. Pt also spit on the floor while this nurse was in room and stated she "threw up" to nurse.   "

## 2022-08-28 NOTE — HOSPITAL COURSE
32-year-old lady with recent cholecystectomy came from dialysis due to chest pain/epigastric pain found to have severe symptomatic anemia. Status post 5 units PRBC transfusions, underwent EGD and colonoscopy which did not show any significant findings.  Overall it appears that patient has underlying psych problems, she likes IV narcotics specially Dilaudid and has very dramatic behavior during her hospital stay mainly constantly seeking IV opioids.  So far imaging did not show any concerning findings explaining her abdominal pain except some postsurgical changes from recent cholecystectomy.  She underwent EGD and colonoscopy which did not show any concerning findings.  She was dialyzed before discharge today.     I have seen the patient on the day of discharge and reviewed the discharge instructions as outlined below. Patient verbalized understanding and is aware to contact primary care physician or return to ED if new or worsening symptoms.

## 2022-08-28 NOTE — DISCHARGE SUMMARY
formerly [facility] Medicine  Discharge Summary      Patient Name: Tabby Howard  MRN: [id number]  Patient Class: IP- Inpatient  Admission Date: 8/23/2022  Hospital Length of Stay: 3 days  Discharge Date and Time: 8/26/2022  5:39 PM  Attending Physician: Emani att. providers found   Discharging Provider: Amalia Jefferson MD  Primary Care Provider: [facility]      HPI:   Ms. Howard is 31y/o female with PMHx of End-Stage Renal disease (dialysis T-T-S), Congestive heart failure (EFC 55% 3/22), Diabetes 2, hyperkalemia,Hypertension, GERD, Sickle cell trait, anemia, and Gastroparesis. Patient reported to ED via EMS transportation with c/o chest pain and abdominal pain. Patient was at dialysis when symptoms began and EMS was called out to transport patient. She did not receive dialysis on today.  Patient was in distress and given ASA 325mg, Nitro spray sublingual, Fentanyl 100mcg IV, Zofran 4mg per EMS report. She is currently hypertensive spb increased to 200's. She is on 02 @ 2L oxygen saturation is 98%.     In the ED her initial work up her h/h 4.7/13.7,BNP 4,500, Troponin 0.041, Creatinine 3.9, BUN, 39, GFR 20, Transferrin 137, TIBC 192, Iron 76. Chest x-ray scattered lower lung subsegmental atelectasis.Patient was Typed and screened for transfusion. Nephrology was consulted for dialysis.  She received one unit of PRBC in ED and will receive the other 2 in dialysis. GI consulted and will scope patient in the am. Patient will be admitted to hospital medicine for further workup and medical management.                       Procedure(s) (LRB):  COLONOSCOPY (N/A)      Hospital Course:   32-year-old lady with recent cholecystectomy came from dialysis due to chest pain/epigastric pain found to have severe symptomatic anemia. Status post 5 units PRBC transfusions, underwent EGD and colonoscopy which did not show any significant findings.  Overall it appears that patient has  underlying psych problems, she likes IV narcotics specially Dilaudid and has very dramatic behavior during her hospital stay mainly constantly seeking IV opioids.  So far imaging did not show any concerning findings explaining her abdominal pain except some postsurgical changes from recent cholecystectomy.  She underwent EGD and colonoscopy which did not show any concerning findings.  She was dialyzed before discharge today.     I have seen the patient on the day of discharge and reviewed the discharge instructions as outlined below. Patient verbalized understanding and is aware to contact primary care physician or return to ED if new or worsening symptoms.        Goals of Care Treatment Preferences:  Code Status: Full Code    Health care agent: Step-Mother Tanya Howard / Father Garcia Howard  Health care agent number: [phone] / [phone]          What is most important right now is to focus on remaining as independent as possible.  Accordingly, we have decided that the best plan to meet the patient's goals includes continuing with treatment.      Consults:   Consults (From admission, onward)        Status Ordering Provider     Inpatient consult to Registered Dietitian/Nutritionist  Once        Provider:  (Not yet assigned)    Completed SHANNEN LOGAN     Inpatient consult to Nephrology  Once        Provider:  Mando Benitez MD    Completed SHANNEN LOGAN     Inpatient consult to Gastroenterology  Once        Provider:  Micky Paredes III, MD    Completed ELIANA ROWLEY     Inpatient consult to   Once        Provider:  (Not yet assigned)    Completed ELIANA ROWLEY     Inpatient consult to Nephrology  Once        Provider:  Mando Benitez MD    Completed SHANNEN LGOAN          No new Assessment & Plan notes have been filed under this hospital service since the last note was generated.  Service: Hospital Medicine    Final Active Diagnoses:    Diagnosis Date  Noted POA    HTN (hypertension) [I10] 07/30/2022 Yes    ESRD (end stage renal disease) [N18.6] 07/22/2022 Yes    Seizure [R56.9] 05/29/2022 Yes    Type II diabetes mellitus [E11.9] 10/16/2019 Yes      Problems Resolved During this Admission:    Diagnosis Date Noted Date Resolved POA    PRINCIPAL PROBLEM:  Anemia [D64.9] 07/07/2022 08/26/2022 Yes    Chest pain [R07.9] 08/04/2022 08/26/2022 Yes       Discharged Condition: good    Disposition: Home or Self Care    Follow Up:   Follow-up Information     [facility] Follow up in 1 week(s).    Contact information:  [address]  [facility] [address] [phone]             [place] [id number]. Call.    Why: Continue treatment with [facility] on scheduled days -- MWF.  Contact information:  [facility] [address] [phone]                     Patient Instructions:      Diet renal     Diet diabetic     Notify your health care provider if you experience any of the following:  temperature >100.4     Notify your health care provider if you experience any of the following:  increased confusion or weakness     Activity as tolerated       Significant Diagnostic Studies: Labs: All labs within the past 24 hours have been reviewed    Pending Diagnostic Studies:     None         Medications:  Reconciled Home Medications:      Medication List      CONTINUE taking these medications    amLODIPine 10 MG tablet  Commonly known as: NORVASC  Take 1 tablet (10 mg total) by mouth once daily.     apixaban 5 mg Tab  Commonly known as: ELIQUIS  Take 1 tablet (5 mg total) by mouth 2 (two) times daily. For second month on.     ARIPiprazole 5 MG Tab  Commonly known as: ABILIFY  Take 5 mg by mouth once daily.     bisacodyL 5 mg EC tablet  Commonly known as: DULCOLAX  Take 2 tablets (10 mg total) by mouth daily as needed for Constipation.     carvediloL 25 MG tablet  Commonly known as: COREG  Take 1 tablet (25 mg total) by mouth  2 (two) times daily.     cloNIDine 0.2 mg/24 hr td ptwk 0.2 mg/24 hr  Commonly known as: CATAPRES  Place 1 patch onto the skin every 7 days.     desvenlafaxine succinate 50 MG Tb24  Commonly known as: PRISTIQ  Take 50 mg by mouth.     famotidine 20 MG tablet  Commonly known as: PEPCID  Take 1 tablet (20 mg total) by mouth once daily.     FLUoxetine 20 MG capsule  Take 1 capsule (20 mg total) by mouth once daily.     furosemide 40 MG tablet  Commonly known as: LASIX  Take 1 tablet (40 mg total) by mouth 2 (two) times daily.     hydrALAZINE 100 MG tablet  Commonly known as: APRESOLINE  Take 1 tablet (100 mg total) by mouth every 8 (eight) hours.     insulin aspart U-100 100 unit/mL (3 mL) Inpn pen  Commonly known as: NovoLOG  Inject 1-10 Units into the skin before meals and at bedtime as needed (Hyperglycemia).     isosorbide mononitrate 60 MG 24 hr tablet  Commonly known as: IMDUR  Take 2 tablets (120 mg total) by mouth once daily.     LANTUS U-100 INSULIN 100 unit/mL injection  Generic drug: insulin glargine  Inject 5 Units into the skin once daily.     levETIRAcetam 500 MG Tab  Commonly known as: KEPPRA  Take 1 tablet (500 mg total) by mouth 2 (two) times daily.     ondansetron 4 MG Tbdl  Commonly known as: ZOFRAN-ODT  Take 1 tablet (4 mg total) by mouth every 8 (eight) hours as needed (nausea, vomiting).     oxyCODONE-acetaminophen 5-325 mg per tablet  Commonly known as: PERCOCET  Take 1 tablet by mouth every 6 (six) hours as needed for Pain.     pantoprazole 40 MG tablet  Commonly known as: PROTONIX  Take 1 tablet (40 mg total) by mouth once daily.     polyethylene glycol 17 gram/dose powder  Commonly known as: GLYCOLAX  Take 17 g by mouth once daily.        STOP taking these medications    atenoloL 50 MG tablet  Commonly known as: TENORMIN     omeprazole 20 MG capsule  Commonly known as: PRILOSEC     sodium bicarbonate 650 MG tablet     tamsulosin 0.4 mg Cap  Commonly known as: FLOMAX     torsemide 20 MG  Tab  Commonly known as: DEMADEX            Indwelling Lines/Drains at time of discharge:   Lines/Drains/Airways     Central Venous Catheter Line  Duration                Hemodialysis Catheter 07/26/22 1457 right internal jugular 32 days                Time spent on the discharge of patient: 32 minutes         Amalia Jefferson MD  Department of Hospital Medicine  [facility]

## 2022-08-29 NOTE — PROGRESS NOTES
Patient declined ED navigation assessment and denied any needs at this time.     Tali Soriano  ED Navigator

## 2022-09-02 ENCOUNTER — HOSPITAL ENCOUNTER (EMERGENCY)
Facility: HOSPITAL | Age: 32
Discharge: HOME | End: 2022-09-02
Attending: EMERGENCY MEDICINE
Payer: MEDICAID

## 2022-09-02 VITALS
HEIGHT: 62 IN | SYSTOLIC BLOOD PRESSURE: 156 MMHG | HEART RATE: 95 BPM | BODY MASS INDEX: 28.23 KG/M2 | OXYGEN SATURATION: 95 % | DIASTOLIC BLOOD PRESSURE: 94 MMHG | RESPIRATION RATE: 20 BRPM | TEMPERATURE: 99 F

## 2022-09-02 DIAGNOSIS — M54.9 BACK PAIN: Primary | ICD-10-CM

## 2022-09-02 LAB
ALBUMIN SERPL BCP-MCNC: 2.5 G/DL (ref 3.5–5.2)
ALP SERPL-CCNC: 114 U/L (ref 55–135)
ALT SERPL W/O P-5'-P-CCNC: 29 U/L (ref 10–44)
ANION GAP SERPL CALC-SCNC: 11 MMOL/L (ref 8–16)
AST SERPL-CCNC: 39 U/L (ref 10–40)
BASOPHILS # BLD AUTO: 0.03 K/UL (ref 0–0.2)
BASOPHILS NFR BLD: 0.3 % (ref 0–1.9)
BILIRUB SERPL-MCNC: 1.8 MG/DL (ref 0.1–1)
BUN SERPL-MCNC: 22 MG/DL (ref 6–20)
CALCIUM SERPL-MCNC: 8.5 MG/DL (ref 8.7–10.5)
CHLORIDE SERPL-SCNC: 101 MMOL/L (ref 95–110)
CO2 SERPL-SCNC: 30 MMOL/L (ref 23–29)
CREAT SERPL-MCNC: 3 MG/DL (ref 0.5–1.4)
DIFFERENTIAL METHOD: ABNORMAL
EOSINOPHIL # BLD AUTO: 0.2 K/UL (ref 0–0.5)
EOSINOPHIL NFR BLD: 1.4 % (ref 0–8)
ERYTHROCYTE [DISTWIDTH] IN BLOOD BY AUTOMATED COUNT: 13.4 % (ref 11.5–14.5)
EST. GFR  (NO RACE VARIABLE): 20 ML/MIN/1.73 M^2
GLUCOSE SERPL-MCNC: 104 MG/DL (ref 70–110)
HCT VFR BLD AUTO: 28.7 % (ref 37–48.5)
HGB BLD-MCNC: 10.5 G/DL (ref 12–16)
IMM GRANULOCYTES # BLD AUTO: 0.05 K/UL (ref 0–0.04)
IMM GRANULOCYTES NFR BLD AUTO: 0.5 % (ref 0–0.5)
LYMPHOCYTES # BLD AUTO: 1.1 K/UL (ref 1–4.8)
LYMPHOCYTES NFR BLD: 10.6 % (ref 18–48)
MCH RBC QN AUTO: 29.8 PG (ref 27–31)
MCHC RBC AUTO-ENTMCNC: 36.6 G/DL (ref 32–36)
MCV RBC AUTO: 82 FL (ref 82–98)
MONOCYTES # BLD AUTO: 0.6 K/UL (ref 0.3–1)
MONOCYTES NFR BLD: 6.1 % (ref 4–15)
NEUTROPHILS # BLD AUTO: 8.5 K/UL (ref 1.8–7.7)
NEUTROPHILS NFR BLD: 81.1 % (ref 38–73)
NRBC BLD-RTO: 0 /100 WBC
PLATELET # BLD AUTO: 65 K/UL (ref 150–450)
PMV BLD AUTO: 10.3 FL (ref 9.2–12.9)
POTASSIUM SERPL-SCNC: 3.2 MMOL/L (ref 3.5–5.1)
PROT SERPL-MCNC: 5.7 G/DL (ref 6–8.4)
RBC # BLD AUTO: 3.52 M/UL (ref 4–5.4)
SODIUM SERPL-SCNC: 142 MMOL/L (ref 136–145)
WBC # BLD AUTO: 10.52 K/UL (ref 3.9–12.7)

## 2022-09-02 PROCEDURE — 36415 COLL VENOUS BLD VENIPUNCTURE: CPT | Performed by: PHYSICIAN ASSISTANT

## 2022-09-02 PROCEDURE — 99285 EMERGENCY DEPT VISIT HI MDM: CPT | Mod: 25

## 2022-09-02 PROCEDURE — 93005 ELECTROCARDIOGRAM TRACING: CPT

## 2022-09-02 PROCEDURE — 25000003 PHARM REV CODE 250: Performed by: PHYSICIAN ASSISTANT

## 2022-09-02 PROCEDURE — 93010 EKG 12-LEAD: ICD-10-PCS | Mod: ,,, | Performed by: INTERNAL MEDICINE

## 2022-09-02 PROCEDURE — 85025 COMPLETE CBC W/AUTO DIFF WBC: CPT | Performed by: PHYSICIAN ASSISTANT

## 2022-09-02 PROCEDURE — 93010 ELECTROCARDIOGRAM REPORT: CPT | Mod: ,,, | Performed by: INTERNAL MEDICINE

## 2022-09-02 PROCEDURE — 63600175 PHARM REV CODE 636 W HCPCS: Performed by: PHYSICIAN ASSISTANT

## 2022-09-02 PROCEDURE — 96374 THER/PROPH/DIAG INJ IV PUSH: CPT

## 2022-09-02 PROCEDURE — 80053 COMPREHEN METABOLIC PANEL: CPT | Performed by: PHYSICIAN ASSISTANT

## 2022-09-02 RX ORDER — DROPERIDOL 2.5 MG/ML
2.5 INJECTION, SOLUTION INTRAMUSCULAR; INTRAVENOUS
Status: COMPLETED | OUTPATIENT
Start: 2022-09-02 | End: 2022-09-02

## 2022-09-02 RX ORDER — AMLODIPINE BESYLATE 5 MG/1
10 TABLET ORAL
Status: COMPLETED | OUTPATIENT
Start: 2022-09-02 | End: 2022-09-02

## 2022-09-02 RX ORDER — ACETAMINOPHEN 500 MG
1000 TABLET ORAL
Status: COMPLETED | OUTPATIENT
Start: 2022-09-02 | End: 2022-09-02

## 2022-09-02 RX ORDER — HYDRALAZINE HYDROCHLORIDE 25 MG/1
100 TABLET, FILM COATED ORAL
Status: COMPLETED | OUTPATIENT
Start: 2022-09-02 | End: 2022-09-02

## 2022-09-02 RX ORDER — CARVEDILOL 25 MG/1
25 TABLET ORAL 2 TIMES DAILY
Status: DISCONTINUED | OUTPATIENT
Start: 2022-09-02 | End: 2022-09-02

## 2022-09-02 RX ORDER — CARVEDILOL 25 MG/1
25 TABLET ORAL
Status: COMPLETED | OUTPATIENT
Start: 2022-09-02 | End: 2022-09-02

## 2022-09-02 RX ADMIN — AMLODIPINE BESYLATE 10 MG: 5 TABLET ORAL at 02:09

## 2022-09-02 RX ADMIN — DROPERIDOL 2.5 MG: 2.5 INJECTION, SOLUTION INTRAMUSCULAR; INTRAVENOUS at 04:09

## 2022-09-02 RX ADMIN — HYDRALAZINE HYDROCHLORIDE 100 MG: 25 TABLET, FILM COATED ORAL at 02:09

## 2022-09-02 RX ADMIN — CARVEDILOL 25 MG: 25 TABLET, FILM COATED ORAL at 03:09

## 2022-09-02 RX ADMIN — ACETAMINOPHEN 1000 MG: 500 TABLET ORAL at 01:09

## 2022-09-02 NOTE — ED PROVIDER NOTES
"Encounter Date: 2022       History     Chief Complaint   Patient presents with    Back Pain     Pt arrived with her daughter in room 9.  Pt  now c/o diffuse back pain.  Pain  is undescribable.  Pt states the only med that helps this kind of pain is "Dilaulala"     Patient is a 32 year old female who presents with back pain for two days. She has PMH significant for ESRD, DM and sickle cell trait. She reports back pain started yesterday and is worse with movement and palpation. She denied any injury. She denied chest pain or shortness of breath. She reports associated nausea and vomiting. She states she did not take anything for the pain. She denied any abdominal pain. She did not take her medications for her blood pressure this morning.     The history is provided by the patient.   Review of patient's allergies indicates:   Allergen Reactions    Penicillins Hives     Past Medical History:   Diagnosis Date    CKD (chronic kidney disease), stage IV 2022    Diabetes mellitus due to underlying condition with unspecified complications 2022    Gastroparesis 2022    Heart failure with preserved ejection fraction 2022    EF 55% on 3/22    History of gastroesophageal reflux (GERD)     History of supraventricular tachycardia     Hyperkalemia 2022    Hypertensive emergency 2022    Sickle cell trait 2022    Type 2 diabetes mellitus      Past Surgical History:   Procedure Laterality Date     SECTION      x 3    COLONOSCOPY      COLONOSCOPY N/A 2022    Procedure: COLONOSCOPY;  Surgeon: Jagdeep Cedeno MD;  Location: [facility];  Service: Endoscopy;  Laterality: N/A;    ESOPHAGOGASTRODUODENOSCOPY N/A 10/18/2019    Procedure: ESOPHAGOGASTRODUODENOSCOPY (EGD);  Surgeon: Gianluca Mendez MD;  Location: [facility];  Service: Endoscopy;  Laterality: N/A;    ESOPHAGOGASTRODUODENOSCOPY N/A 2022    Procedure: EGD (ESOPHAGOGASTRODUODENOSCOPY);  Surgeon: Micky Paredes III, MD;  " Location: [facility] ENDO;  Service: Endoscopy;  Laterality: N/A;    LAPAROSCOPIC CHOLECYSTECTOMY N/A 07/30/2022    Procedure: CHOLECYSTECTOMY, LAPAROSCOPIC;  Surgeon: Grey Perez MD;  Location: [facility] OR;  Service: General;  Laterality: N/A;    PLACEMENT OF DUAL-LUMEN VASCULAR CATHETER Left 07/12/2022    Procedure: INSERTION-CATHETER-JOSEPH;  Surgeon: Dionte Gan MD;  Location: [facility] OR;  Service: General;  Laterality: Left;    PLACEMENT OF DUAL-LUMEN VASCULAR CATHETER Right 07/26/2022    Procedure: INSERTION-CATHETER-Hemosplit;  Surgeon: Dionte Gan MD;  Location: [facility] OR;  Service: General;  Laterality: Right;     Family History   Problem Relation Age of Onset    Diabetes Mother     Diabetes Father      Social History     Tobacco Use    Smoking status: Never    Smokeless tobacco: Never   Substance Use Topics    Alcohol use: No    Drug use: No     Review of Systems   Constitutional:  Negative for activity change, appetite change, chills and fever.   HENT:  Negative for congestion, rhinorrhea and sore throat.    Eyes:  Negative for redness and visual disturbance.   Respiratory:  Negative for cough, chest tightness and shortness of breath.    Cardiovascular:  Negative for chest pain.   Gastrointestinal:  Negative for abdominal pain, diarrhea, nausea and vomiting.   Genitourinary:  Negative for dysuria and frequency.   Musculoskeletal:  Positive for back pain. Negative for neck pain and neck stiffness.   Skin:  Negative for rash.   Neurological:  Negative for dizziness, syncope, numbness and headaches.     Physical Exam     Vitals:    09/02/22 1534 09/02/22 1602 09/02/22 1612 09/02/22 1632   BP: (!) 189/140 (!) 164/99  (!) 156/94   BP Location:       Patient Position:       Pulse: 104 101  95   Resp: 20 20  20   Temp:       TempSrc:       SpO2:   95%    Height:             Physical Exam    Nursing note and vitals reviewed.  Constitutional: Vital signs are normal. She appears well-developed and well-nourished.  She is cooperative.  Non-toxic appearance. She does not have a sickly appearance.   HENT:   Head: Normocephalic and atraumatic.   Right Ear: External ear normal.   Left Ear: External ear normal.   Nose: Nose normal.   Eyes: Conjunctivae and lids are normal.   Neck:    Full passive range of motion without pain.     Cardiovascular:  Normal rate, regular rhythm and normal heart sounds.     Exam reveals no gallop and no friction rub.       No murmur heard.  Pulmonary/Chest: Effort normal and breath sounds normal. She has no wheezes. She has no rhonchi. She has no rales.   Abdominal: Abdomen is soft. There is no abdominal tenderness. There is no rebound and no guarding.   Musculoskeletal:      Cervical back: Full passive range of motion without pain.      Comments: Paraspinal muscle tenderness to the thoracic spine with no spinous process tenderness. No skin changes. Equal strength to bilateral upper extremities.      Neurological: She is alert.   Skin: Skin is warm, dry and intact. No rash noted.       ED Course   Procedures  Labs Reviewed   CBC W/ AUTO DIFFERENTIAL - Abnormal; Notable for the following components:       Result Value    RBC 3.52 (*)     Hemoglobin 10.5 (*)     Hematocrit 28.7 (*)     MCHC 36.6 (*)     Platelets 65 (*)     Gran # (ANC) 8.5 (*)     Immature Grans (Abs) 0.05 (*)     Gran % 81.1 (*)     Lymph % 10.6 (*)     All other components within normal limits   COMPREHENSIVE METABOLIC PANEL - Abnormal; Notable for the following components:    Potassium 3.2 (*)     CO2 30 (*)     BUN 22 (*)     Creatinine 3.0 (*)     Calcium 8.5 (*)     Total Protein 5.7 (*)     Albumin 2.5 (*)     Total Bilirubin 1.8 (*)     eGFR 20 (*)     All other components within normal limits     EKG Readings: (Independently Interpreted)   Initial Reading: No STEMI. Previous EKG: Compared with most recent EKG Previous EKG Date: 8/24. Rhythm: Sinus Tachycardia. Heart Rate: 104. Ectopy: No Ectopy.     Imaging Results               X-Ray Chest 1 View (Final result)  Result time 09/02/22 14:31:35      Final result by Sunshine Awan MD (09/02/22 14:31:35)                   Impression:      Mild perihilar infiltrates or pulmonary vascular distention appearing slightly greater today than on prior study of 08/23/2022.      Electronically signed by: Sunshine Awan MD  Date:    09/02/2022  Time:    14:31               Narrative:    EXAMINATION:  XR CHEST 1 VIEW    CLINICAL HISTORY:  Dorsalgia, unspecified    TECHNIQUE:  Single frontal view of the chest was performed.    COMPARISON:  08/23/2022    FINDINGS:  Hemodialysis catheter remains in place with the tip appearing at the level the right atrium near the caval atrial junction.  The cardiomediastinal silhouette appears stable.  Mild perihilar and basilar infiltrates increased.  No pleural effusion.                                       Medications   acetaminophen tablet 1,000 mg (1,000 mg Oral Given 9/2/22 1310)   amLODIPine tablet 10 mg (10 mg Oral Given 9/2/22 1420)   hydrALAZINE tablet 100 mg (100 mg Oral Given 9/2/22 1420)   carvediloL tablet 25 mg (25 mg Oral Given 9/2/22 1508)   droperidoL injection 2.5 mg (2.5 mg Intravenous Given 9/2/22 1600)     Medical Decision Making:   History:   Old Medical Records: I decided to obtain old medical records.  Clinical Tests:   Lab Tests: Ordered and Reviewed  Radiological Study: Ordered and Reviewed  Medical Tests: Ordered and Reviewed     APC / Resident Notes:   Emergent evaluation of a 32 year old female who presents with back pain that is worse with movement and palpation. She denied chest pain and shortness of breath. She brought her daughter to the ER for seizures and then checked herself in for the pain. She has tenderness to the thoracic para-spinal muscles. No thoracic spine tenderness. EKG unchanged from prior and no STEMI. She denied chest pain or shortness of breath. She has nausea with no abdominal pain. Soft and non-tender abdomen. I  doubt acute intra-abdominal process. Labs are stable. She was given anti-emetics and her routine BP meds with improvement. CXR shows mild pulmonary congestion. She is due for HD tomorrow. No increased work of breathing. No sign of pneumonia. Discussed results with patient. Return precautions given. Based on my clinical evaluation, I do not appreciate any immediate, emergent, or life threatening condition or etiology that warrants additional workup today and feel that the patient can be discharged with close follow up care.  Patient is to follow up with their primary care provider. Case was discussed with Dr. Hoover who is in agreement with the plan of care. All questions answered.                  Clinical Impression:   Final diagnoses:  [M54.9] Back pain (Primary)      ED Disposition Condition    Discharge Stable          ED Prescriptions    None       Follow-up Information       Follow up With Specialties Details Why Contact [address] [address]  [phone]      [facility] - Emergency Dept Emergency Medicine  As needed [address] [place] [id number] [phone]             Nancie Almazan PA-C  09/02/22 4470

## 2022-09-02 NOTE — DISCHARGE INSTRUCTIONS
Follow up closely with your primary care provider.  Take tylenol as needed for pain.  For worsening symptoms, chest pain, shortness of breath, increased abdominal pain, high grade fever, stroke or stroke like symptoms, immediately go to the nearest Emergency Room or call 911 as soon as possible.

## 2022-09-03 ENCOUNTER — HOSPITAL ENCOUNTER (EMERGENCY)
Facility: HOSPITAL | Age: 32
Discharge: HOME | End: 2022-09-03
Attending: EMERGENCY MEDICINE
Payer: MEDICAID

## 2022-09-03 VITALS
OXYGEN SATURATION: 99 % | SYSTOLIC BLOOD PRESSURE: 161 MMHG | HEART RATE: 84 BPM | TEMPERATURE: 98 F | RESPIRATION RATE: 20 BRPM | DIASTOLIC BLOOD PRESSURE: 86 MMHG

## 2022-09-03 DIAGNOSIS — G89.29 CHRONIC ABDOMINAL PAIN: ICD-10-CM

## 2022-09-03 DIAGNOSIS — R10.9 CHRONIC ABDOMINAL PAIN: ICD-10-CM

## 2022-09-03 DIAGNOSIS — I10 HYPERTENSION, UNSPECIFIED TYPE: Primary | ICD-10-CM

## 2022-09-03 DIAGNOSIS — N18.6 STAGE 5 CHRONIC KIDNEY DISEASE ON CHRONIC DIALYSIS: ICD-10-CM

## 2022-09-03 DIAGNOSIS — Z99.2 STAGE 5 CHRONIC KIDNEY DISEASE ON CHRONIC DIALYSIS: ICD-10-CM

## 2022-09-03 PROCEDURE — 99283 EMERGENCY DEPT VISIT LOW MDM: CPT

## 2022-09-03 RX ORDER — ALBUTEROL SULFATE 90 UG/1
2 AEROSOL, METERED RESPIRATORY (INHALATION) EVERY 6 HOURS PRN
Status: ON HOLD | COMMUNITY
End: 2022-11-15 | Stop reason: SDUPTHER

## 2022-09-03 NOTE — ED PROVIDER NOTES
Encounter Date: 9/3/2022       History     Chief Complaint   Patient presents with    Hypertension     32-year-old female who has a history of hypertension, diabetes, end-stage renal disease on hemodialysis for now 1 month any history of sickle cell trait and who was seen in the emergency room yesterday at [facility] with significant hypertension and discharge, is transferred now from [facility] where she had accompanied her ill child who had a seizure disorder.  At that facility the patient complained of abdominal and back pain.  No fever or chills.  The patient has had some prior history of questionable pain medication overuse.  No vomiting or diarrhea.  She states that she is oliguric.  He was noted that her pain was worse in her back with movement.  No fever or chills.  At [facility] the patient had labs done which were unremarkable.  Her potassium was 2.6.  She is due for dialysis regularly at 11:00 a.m. today.  At [facility] the patient was given her usual antihypertensive medications which included Coreg 25 mg, hydralazine 100 mg and Norvasc 10 mg.  At [facility] her last reported blood pressure was 208/120.  Upon presentation to this ED had blood pressure is 160/89.    Review of patient's allergies indicates:   Allergen Reactions    Penicillins Hives     Past Medical History:   Diagnosis Date    CKD (chronic kidney disease), stage IV 2022    Diabetes mellitus due to underlying condition with unspecified complications 2022    Gastroparesis 2022    Heart failure with preserved ejection fraction 2022    EF 55% on 3/22    History of gastroesophageal reflux (GERD)     History of supraventricular tachycardia     Hyperkalemia 2022    Hypertensive emergency 2022    Sickle cell trait 2022    Type 2 diabetes mellitus      Past Surgical History:   Procedure Laterality Date     SECTION      x 3    COLONOSCOPY      COLONOSCOPY N/A 2022     Procedure: COLONOSCOPY;  Surgeon: Jagdeep Cedeno MD;  Location: [facility];  Service: Endoscopy;  Laterality: N/A;    ESOPHAGOGASTRODUODENOSCOPY N/A 10/18/2019    Procedure: ESOPHAGOGASTRODUODENOSCOPY (EGD);  Surgeon: Gianluca Mendez MD;  Location: [facility] ENDO;  Service: Endoscopy;  Laterality: N/A;    ESOPHAGOGASTRODUODENOSCOPY N/A 08/24/2022    Procedure: EGD (ESOPHAGOGASTRODUODENOSCOPY);  Surgeon: Micky Paredes III, MD;  Location: [facility] ENDO;  Service: Endoscopy;  Laterality: N/A;    LAPAROSCOPIC CHOLECYSTECTOMY N/A 07/30/2022    Procedure: CHOLECYSTECTOMY, LAPAROSCOPIC;  Surgeon: Grey Peerz MD;  Location: [facility];  Service: General;  Laterality: N/A;    PLACEMENT OF DUAL-LUMEN VASCULAR CATHETER Left 07/12/2022    Procedure: INSERTION-CATHETER-JOSEPH;  Surgeon: Dionte Gan MD;  Location: [facility] OR;  Service: General;  Laterality: Left;    PLACEMENT OF DUAL-LUMEN VASCULAR CATHETER Right 07/26/2022    Procedure: INSERTION-CATHETER-Hemosplit;  Surgeon: Dionte Gan MD;  Location: [facility] OR;  Service: General;  Laterality: Right;     Family History   Problem Relation Age of Onset    Diabetes Mother     Diabetes Father      Social History     Tobacco Use    Smoking status: Never    Smokeless tobacco: Never   Substance Use Topics    Alcohol use: No    Drug use: No     Review of Systems   Constitutional:  Negative for appetite change, chills, diaphoresis and fever.   HENT: Negative.  Negative for sore throat.    Respiratory: Negative.  Negative for shortness of breath.    Cardiovascular: Negative.  Negative for chest pain.   Gastrointestinal:  Positive for abdominal pain. Negative for diarrhea, nausea and vomiting.   Genitourinary:  Positive for decreased urine volume. Negative for dysuria.   Musculoskeletal:  Positive for back pain.   Skin:  Negative for color change, pallor and rash.   Neurological:  Negative for weakness and headaches.   Hematological:  Does not bruise/bleed easily.   All other systems  reviewed and are negative.    Physical Exam     Initial Vitals [09/03/22 0947]   BP Pulse Resp Temp SpO2   (!) 160/89 88 18 98.2 °F (36.8 °C) 99 %      MAP       --         Physical Exam    Constitutional: She appears well-developed and well-nourished. She is not diaphoretic. No distress.   HENT:   Head: Normocephalic and atraumatic.   Nose: Nose normal.   Mouth/Throat: Oropharynx is clear and moist. No oropharyngeal exudate.   Eyes: Conjunctivae are normal. Pupils are equal, round, and reactive to light.   Neck: Neck supple. No JVD present.   Normal range of motion.  Cardiovascular:  Normal rate, regular rhythm, normal heart sounds and intact distal pulses.     Exam reveals no gallop and no friction rub.       No murmur heard.  Pulmonary/Chest: Breath sounds normal. No respiratory distress. She has no wheezes. She has no rhonchi. She has no rales. She exhibits no tenderness.   Dialysis catheter in right subclavian area   Abdominal: Abdomen is soft. Bowel sounds are normal. She exhibits no distension. There is no abdominal tenderness.   Mild direct tenderness.  No guarding or rebound. There is no rebound and no guarding.   Musculoskeletal:         General: No tenderness or edema. Normal range of motion.      Cervical back: Normal range of motion and neck supple.     Lymphadenopathy:     She has no cervical adenopathy.   Neurological: She is alert and oriented to person, place, and time. She has normal strength. She displays normal reflexes. No cranial nerve deficit or sensory deficit. GCS score is 15. GCS eye subscore is 4. GCS verbal subscore is 5. GCS motor subscore is 6.   Skin: Skin is warm and dry. Capillary refill takes less than 2 seconds. No rash noted. No erythema. No pallor.   Psychiatric: She has a normal mood and affect. Her behavior is normal. Judgment and thought content normal.       ED Course   Procedures  Labs Reviewed - No data to display       Imaging Results    None          Medications - No data  to display             Attending Attestation:             Attending ED Notes:   This 32-year-old female has history of CKD and hypertension, was given a blood pressure medication at [facility] and has responded appropriately.  No other intervention is required reparable to the blood pressure.  Abdomen is not acute.  Patient takes very little if any urine.  Her labs done at [facility] as well as those done yesterday at [facility] unremarkable.  The patient is due for dialysis this morning advised that she is to be discharged to go to her unit and follow-up with Dr. Benitez next week.             Clinical Impression:   Final diagnoses:  [I10] Hypertension, unspecified type (Primary)  [N18.6, Z99.2] Stage 5 chronic kidney disease on chronic dialysis  [R10.9, G89.29] Chronic abdominal pain      ED Disposition Condition    Discharge Stable          ED Prescriptions    None       Follow-up Information       Follow up With Specialties Details Why Contact Info    Mando Benitez MD Nephrology Schedule an appointment as soon as possible for a visit  For next week. [address] [facility]  [place] [facility] [address]  [phone]               Ethan Tobin Jr., MD  09/03/22 0417

## 2022-09-03 NOTE — ED NOTES
Patient's hemodialysis catheter open to air.. patient educated on important of keeping catheter clean and covered. Site cleaned and dressed with a central line dressing.

## 2022-09-03 NOTE — DISCHARGE INSTRUCTIONS
Go to dialysis immediately upon discharge from the emergency room.  Continue all routine medications including a blood pressure medication daily without fail.  Watch for any fever, vomiting or worsening abdominal pain and return if needed.

## 2022-09-03 NOTE — ED TRIAGE NOTES
32 y.o. female to ED transferred from [facility] for hypertension. Patient was at [facility] with her daughter when she checked into the ER because she needs dialysis. Patient was found to have hypertension and was given her prescribed dose of blood pressure medication there. BP remained high and she was transferred here. Patient now c.o. abdominal pain and back pain. Patient endorses nausea, denies vomiting/ diarrhea, denies fever/chills, denies chest pain/ cough/ shortness of breath. Patient awake, alert, and oriented x 4. No apparent distress noted. VS currently stable. Patient assisted onto stretcher and changed into a gown. Patient placed on cardiac monitor, continuous pulse oximetry and automatic blood pressure cuff. Bed placed in low locked position, side rails up x 2, call light is within reach of patient orientation to room and explanation of wait provided to patient, alarms set and turned on for monitor and pulse ox, awaiting MD evaluation and orders, will continue to monitor.

## 2022-10-04 ENCOUNTER — HOSPITAL ENCOUNTER (INPATIENT)
Facility: HOSPITAL | Age: 32
LOS: 3 days | Discharge: HOME | DRG: 073 | End: 2022-10-07
Attending: EMERGENCY MEDICINE | Admitting: INTERNAL MEDICINE
Payer: MEDICAID

## 2022-10-04 DIAGNOSIS — Z99.2 ESRD (END STAGE RENAL DISEASE) ON DIALYSIS: ICD-10-CM

## 2022-10-04 DIAGNOSIS — N18.6 ESRD (END STAGE RENAL DISEASE) ON DIALYSIS: ICD-10-CM

## 2022-10-04 DIAGNOSIS — R73.9 HYPERGLYCEMIA: ICD-10-CM

## 2022-10-04 DIAGNOSIS — E87.70 HYPERVOLEMIA, UNSPECIFIED HYPERVOLEMIA TYPE: Primary | ICD-10-CM

## 2022-10-04 DIAGNOSIS — R07.9 CHEST PAIN: ICD-10-CM

## 2022-10-04 DIAGNOSIS — R60.1 ANASARCA: ICD-10-CM

## 2022-10-04 DIAGNOSIS — R10.84 DIFFUSE ABDOMINAL PAIN: ICD-10-CM

## 2022-10-04 DIAGNOSIS — R10.84 GENERALIZED ABDOMINAL PAIN: ICD-10-CM

## 2022-10-04 DIAGNOSIS — I47.10 SVT (SUPRAVENTRICULAR TACHYCARDIA): ICD-10-CM

## 2022-10-04 LAB
ALBUMIN SERPL BCP-MCNC: 2.7 G/DL (ref 3.5–5.2)
ALLENS TEST: ABNORMAL
ALP SERPL-CCNC: 227 U/L (ref 55–135)
ALT SERPL W/O P-5'-P-CCNC: 18 U/L (ref 10–44)
ANION GAP SERPL CALC-SCNC: 16 MMOL/L (ref 8–16)
AST SERPL-CCNC: 22 U/L (ref 10–40)
B-OH-BUTYR BLD STRIP-SCNC: 2.7 MMOL/L (ref 0–0.5)
BASOPHILS # BLD AUTO: 0.03 K/UL (ref 0–0.2)
BASOPHILS NFR BLD: 0.4 % (ref 0–1.9)
BILIRUB SERPL-MCNC: 1.8 MG/DL (ref 0.1–1)
BUN SERPL-MCNC: 35 MG/DL (ref 6–20)
CALCIUM SERPL-MCNC: 9.1 MG/DL (ref 8.7–10.5)
CHLORIDE SERPL-SCNC: 100 MMOL/L (ref 95–110)
CO2 SERPL-SCNC: 23 MMOL/L (ref 23–29)
CREAT SERPL-MCNC: 4.4 MG/DL (ref 0.5–1.4)
DELSYS: ABNORMAL
DIFFERENTIAL METHOD: ABNORMAL
EOSINOPHIL # BLD AUTO: 0 K/UL (ref 0–0.5)
EOSINOPHIL NFR BLD: 0.4 % (ref 0–8)
ERYTHROCYTE [DISTWIDTH] IN BLOOD BY AUTOMATED COUNT: 15.5 % (ref 11.5–14.5)
EST. GFR  (NO RACE VARIABLE): 13 ML/MIN/1.73 M^2
GLUCOSE SERPL-MCNC: 472 MG/DL (ref 70–110)
HCO3 UR-SCNC: 32 MMOL/L (ref 24–28)
HCT VFR BLD AUTO: 23.1 % (ref 37–48.5)
HGB BLD-MCNC: 7.8 G/DL (ref 12–16)
IMM GRANULOCYTES # BLD AUTO: 0.02 K/UL (ref 0–0.04)
IMM GRANULOCYTES NFR BLD AUTO: 0.3 % (ref 0–0.5)
LIPASE SERPL-CCNC: 17 U/L (ref 4–60)
LYMPHOCYTES # BLD AUTO: 0.6 K/UL (ref 1–4.8)
LYMPHOCYTES NFR BLD: 8 % (ref 18–48)
MCH RBC QN AUTO: 29.9 PG (ref 27–31)
MCHC RBC AUTO-ENTMCNC: 33.8 G/DL (ref 32–36)
MCV RBC AUTO: 89 FL (ref 82–98)
MODE: ABNORMAL
MONOCYTES # BLD AUTO: 0.2 K/UL (ref 0.3–1)
MONOCYTES NFR BLD: 3 % (ref 4–15)
NEUTROPHILS # BLD AUTO: 6.5 K/UL (ref 1.8–7.7)
NEUTROPHILS NFR BLD: 87.9 % (ref 38–73)
NRBC BLD-RTO: 0 /100 WBC
PCO2 BLDA: 54.4 MMHG (ref 35–45)
PH SMN: 7.38 [PH] (ref 7.35–7.45)
PLATELET # BLD AUTO: 127 K/UL (ref 150–450)
PMV BLD AUTO: 10.8 FL (ref 9.2–12.9)
PO2 BLDA: 26 MMHG (ref 40–60)
POC BE: 7 MMOL/L
POC SATURATED O2: 44 % (ref 95–100)
POC TCO2: 34 MMOL/L (ref 24–29)
POCT GLUCOSE: 206 MG/DL (ref 70–110)
POCT GLUCOSE: 327 MG/DL (ref 70–110)
POCT GLUCOSE: 430 MG/DL (ref 70–110)
POTASSIUM SERPL-SCNC: 4.9 MMOL/L (ref 3.5–5.1)
PROT SERPL-MCNC: 6.6 G/DL (ref 6–8.4)
RBC # BLD AUTO: 2.61 M/UL (ref 4–5.4)
SAMPLE: ABNORMAL
SITE: ABNORMAL
SODIUM SERPL-SCNC: 139 MMOL/L (ref 136–145)
WBC # BLD AUTO: 7.41 K/UL (ref 3.9–12.7)

## 2022-10-04 PROCEDURE — 96375 TX/PRO/DX INJ NEW DRUG ADDON: CPT

## 2022-10-04 PROCEDURE — 25000003 PHARM REV CODE 250: Performed by: EMERGENCY MEDICINE

## 2022-10-04 PROCEDURE — 11000001 HC ACUTE MED/SURG PRIVATE ROOM

## 2022-10-04 PROCEDURE — 80100014 HC HEMODIALYSIS 1:1

## 2022-10-04 PROCEDURE — 82010 KETONE BODYS QUAN: CPT | Performed by: EMERGENCY MEDICINE

## 2022-10-04 PROCEDURE — 25000003 PHARM REV CODE 250: Performed by: INTERNAL MEDICINE

## 2022-10-04 PROCEDURE — 99285 EMERGENCY DEPT VISIT HI MDM: CPT | Mod: 25

## 2022-10-04 PROCEDURE — 93010 EKG 12-LEAD: ICD-10-PCS | Mod: ,,, | Performed by: INTERNAL MEDICINE

## 2022-10-04 PROCEDURE — 99900035 HC TECH TIME PER 15 MIN (STAT)

## 2022-10-04 PROCEDURE — 94761 N-INVAS EAR/PLS OXIMETRY MLT: CPT

## 2022-10-04 PROCEDURE — 96374 THER/PROPH/DIAG INJ IV PUSH: CPT

## 2022-10-04 PROCEDURE — 84466 ASSAY OF TRANSFERRIN: CPT | Performed by: INTERNAL MEDICINE

## 2022-10-04 PROCEDURE — 63600175 PHARM REV CODE 636 W HCPCS: Performed by: EMERGENCY MEDICINE

## 2022-10-04 PROCEDURE — 36415 COLL VENOUS BLD VENIPUNCTURE: CPT | Performed by: INTERNAL MEDICINE

## 2022-10-04 PROCEDURE — C9113 INJ PANTOPRAZOLE SODIUM, VIA: HCPCS | Performed by: EMERGENCY MEDICINE

## 2022-10-04 PROCEDURE — 36415 COLL VENOUS BLD VENIPUNCTURE: CPT | Performed by: EMERGENCY MEDICINE

## 2022-10-04 PROCEDURE — 85025 COMPLETE CBC W/AUTO DIFF WBC: CPT | Performed by: EMERGENCY MEDICINE

## 2022-10-04 PROCEDURE — 83690 ASSAY OF LIPASE: CPT | Performed by: EMERGENCY MEDICINE

## 2022-10-04 PROCEDURE — 93005 ELECTROCARDIOGRAM TRACING: CPT

## 2022-10-04 PROCEDURE — 63600175 PHARM REV CODE 636 W HCPCS: Performed by: INTERNAL MEDICINE

## 2022-10-04 PROCEDURE — 80053 COMPREHEN METABOLIC PANEL: CPT | Performed by: EMERGENCY MEDICINE

## 2022-10-04 PROCEDURE — A4216 STERILE WATER/SALINE, 10 ML: HCPCS | Performed by: INTERNAL MEDICINE

## 2022-10-04 PROCEDURE — 96361 HYDRATE IV INFUSION ADD-ON: CPT

## 2022-10-04 PROCEDURE — 93010 ELECTROCARDIOGRAM REPORT: CPT | Mod: ,,, | Performed by: INTERNAL MEDICINE

## 2022-10-04 PROCEDURE — 82803 BLOOD GASES ANY COMBINATION: CPT

## 2022-10-04 RX ORDER — MAG HYDROX/ALUMINUM HYD/SIMETH 200-200-20
30 SUSPENSION, ORAL (FINAL DOSE FORM) ORAL
Status: COMPLETED | OUTPATIENT
Start: 2022-10-04 | End: 2022-10-04

## 2022-10-04 RX ORDER — LEVETIRACETAM 500 MG/1
500 TABLET ORAL 2 TIMES DAILY
Status: DISCONTINUED | OUTPATIENT
Start: 2022-10-04 | End: 2022-10-07 | Stop reason: HOSPADM

## 2022-10-04 RX ORDER — HYDRALAZINE HYDROCHLORIDE 25 MG/1
100 TABLET, FILM COATED ORAL EVERY 8 HOURS
Status: DISCONTINUED | OUTPATIENT
Start: 2022-10-04 | End: 2022-10-07 | Stop reason: HOSPADM

## 2022-10-04 RX ORDER — SODIUM,POTASSIUM PHOSPHATES 280-250MG
2 POWDER IN PACKET (EA) ORAL
Status: DISCONTINUED | OUTPATIENT
Start: 2022-10-04 | End: 2022-10-07 | Stop reason: HOSPADM

## 2022-10-04 RX ORDER — INSULIN ASPART 100 [IU]/ML
1-10 INJECTION, SOLUTION INTRAVENOUS; SUBCUTANEOUS
Status: DISCONTINUED | OUTPATIENT
Start: 2022-10-04 | End: 2022-10-07 | Stop reason: HOSPADM

## 2022-10-04 RX ORDER — FUROSEMIDE 40 MG/1
40 TABLET ORAL 2 TIMES DAILY
Status: DISCONTINUED | OUTPATIENT
Start: 2022-10-04 | End: 2022-10-07 | Stop reason: HOSPADM

## 2022-10-04 RX ORDER — ISOSORBIDE MONONITRATE 60 MG/1
120 TABLET, EXTENDED RELEASE ORAL DAILY
Status: DISCONTINUED | OUTPATIENT
Start: 2022-10-04 | End: 2022-10-07 | Stop reason: HOSPADM

## 2022-10-04 RX ORDER — FAMOTIDINE 20 MG/1
20 TABLET, FILM COATED ORAL DAILY
Status: DISCONTINUED | OUTPATIENT
Start: 2022-10-04 | End: 2022-10-07 | Stop reason: HOSPADM

## 2022-10-04 RX ORDER — SODIUM CHLORIDE 0.9 % (FLUSH) 0.9 %
10 SYRINGE (ML) INJECTION EVERY 8 HOURS
Status: DISCONTINUED | OUTPATIENT
Start: 2022-10-04 | End: 2022-10-07 | Stop reason: HOSPADM

## 2022-10-04 RX ORDER — AMLODIPINE BESYLATE 5 MG/1
10 TABLET ORAL DAILY
Status: DISCONTINUED | OUTPATIENT
Start: 2022-10-04 | End: 2022-10-07 | Stop reason: HOSPADM

## 2022-10-04 RX ORDER — MORPHINE SULFATE 2 MG/ML
6 INJECTION, SOLUTION INTRAMUSCULAR; INTRAVENOUS
Status: COMPLETED | OUTPATIENT
Start: 2022-10-04 | End: 2022-10-04

## 2022-10-04 RX ORDER — POLYETHYLENE GLYCOL 3350 17 G/17G
17 POWDER, FOR SOLUTION ORAL DAILY PRN
Status: DISCONTINUED | OUTPATIENT
Start: 2022-10-04 | End: 2022-10-07 | Stop reason: HOSPADM

## 2022-10-04 RX ORDER — HYDRALAZINE HYDROCHLORIDE 20 MG/ML
10 INJECTION INTRAMUSCULAR; INTRAVENOUS EVERY 4 HOURS PRN
Status: DISCONTINUED | OUTPATIENT
Start: 2022-10-04 | End: 2022-10-07 | Stop reason: HOSPADM

## 2022-10-04 RX ORDER — ONDANSETRON 2 MG/ML
4 INJECTION INTRAMUSCULAR; INTRAVENOUS EVERY 8 HOURS PRN
Status: DISCONTINUED | OUTPATIENT
Start: 2022-10-04 | End: 2022-10-07 | Stop reason: HOSPADM

## 2022-10-04 RX ORDER — LANOLIN ALCOHOL/MO/W.PET/CERES
800 CREAM (GRAM) TOPICAL
Status: DISCONTINUED | OUTPATIENT
Start: 2022-10-04 | End: 2022-10-07 | Stop reason: HOSPADM

## 2022-10-04 RX ORDER — SODIUM CHLORIDE 9 MG/ML
INJECTION, SOLUTION INTRAVENOUS ONCE
Status: DISCONTINUED | OUTPATIENT
Start: 2022-10-04 | End: 2022-10-07 | Stop reason: HOSPADM

## 2022-10-04 RX ORDER — CLONIDINE 0.2 MG/24H
1 PATCH, EXTENDED RELEASE TRANSDERMAL
Status: DISCONTINUED | OUTPATIENT
Start: 2022-10-05 | End: 2022-10-07 | Stop reason: HOSPADM

## 2022-10-04 RX ORDER — ARIPIPRAZOLE 5 MG/1
5 TABLET ORAL DAILY
Status: DISCONTINUED | OUTPATIENT
Start: 2022-10-04 | End: 2022-10-07 | Stop reason: HOSPADM

## 2022-10-04 RX ORDER — CLONIDINE 0.2 MG/24H
1 PATCH, EXTENDED RELEASE TRANSDERMAL
Status: DISCONTINUED | OUTPATIENT
Start: 2022-10-04 | End: 2022-10-04

## 2022-10-04 RX ORDER — GLUCAGON 1 MG
1 KIT INJECTION
Status: DISCONTINUED | OUTPATIENT
Start: 2022-10-04 | End: 2022-10-07 | Stop reason: HOSPADM

## 2022-10-04 RX ORDER — IBUPROFEN 200 MG
24 TABLET ORAL
Status: DISCONTINUED | OUTPATIENT
Start: 2022-10-04 | End: 2022-10-07 | Stop reason: HOSPADM

## 2022-10-04 RX ORDER — IBUPROFEN 200 MG
16 TABLET ORAL
Status: DISCONTINUED | OUTPATIENT
Start: 2022-10-04 | End: 2022-10-07 | Stop reason: HOSPADM

## 2022-10-04 RX ORDER — HEPARIN SODIUM 1000 [USP'U]/ML
4000 INJECTION, SOLUTION INTRAVENOUS; SUBCUTANEOUS
Status: DISCONTINUED | OUTPATIENT
Start: 2022-10-04 | End: 2022-10-07 | Stop reason: HOSPADM

## 2022-10-04 RX ORDER — BISACODYL 5 MG
10 TABLET, DELAYED RELEASE (ENTERIC COATED) ORAL DAILY PRN
Status: DISCONTINUED | OUTPATIENT
Start: 2022-10-04 | End: 2022-10-07 | Stop reason: HOSPADM

## 2022-10-04 RX ORDER — ACETAMINOPHEN 325 MG/1
650 TABLET ORAL EVERY 8 HOURS PRN
Status: DISCONTINUED | OUTPATIENT
Start: 2022-10-04 | End: 2022-10-07 | Stop reason: HOSPADM

## 2022-10-04 RX ORDER — CARVEDILOL 25 MG/1
25 TABLET ORAL 2 TIMES DAILY
Status: DISCONTINUED | OUTPATIENT
Start: 2022-10-04 | End: 2022-10-07 | Stop reason: HOSPADM

## 2022-10-04 RX ORDER — HYDRALAZINE HYDROCHLORIDE 25 MG/1
100 TABLET, FILM COATED ORAL
Status: COMPLETED | OUTPATIENT
Start: 2022-10-04 | End: 2022-10-04

## 2022-10-04 RX ORDER — ATENOLOL 50 MG/1
50 TABLET ORAL EVERY OTHER DAY
Status: DISCONTINUED | OUTPATIENT
Start: 2022-10-05 | End: 2022-10-04 | Stop reason: SDUPTHER

## 2022-10-04 RX ORDER — ONDANSETRON 2 MG/ML
4 INJECTION INTRAMUSCULAR; INTRAVENOUS
Status: COMPLETED | OUTPATIENT
Start: 2022-10-04 | End: 2022-10-04

## 2022-10-04 RX ORDER — IPRATROPIUM BROMIDE AND ALBUTEROL SULFATE 2.5; .5 MG/3ML; MG/3ML
3 SOLUTION RESPIRATORY (INHALATION) EVERY 6 HOURS PRN
Status: DISCONTINUED | OUTPATIENT
Start: 2022-10-04 | End: 2022-10-07 | Stop reason: HOSPADM

## 2022-10-04 RX ORDER — OXYCODONE AND ACETAMINOPHEN 5; 325 MG/1; MG/1
1 TABLET ORAL EVERY 6 HOURS PRN
Status: DISCONTINUED | OUTPATIENT
Start: 2022-10-04 | End: 2022-10-07 | Stop reason: HOSPADM

## 2022-10-04 RX ORDER — POLYETHYLENE GLYCOL 3350 17 G/17G
17 POWDER, FOR SOLUTION ORAL DAILY
Status: DISCONTINUED | OUTPATIENT
Start: 2022-10-04 | End: 2022-10-04 | Stop reason: SDUPTHER

## 2022-10-04 RX ORDER — HEPARIN SODIUM 5000 [USP'U]/ML
5000 INJECTION, SOLUTION INTRAVENOUS; SUBCUTANEOUS
Status: DISCONTINUED | OUTPATIENT
Start: 2022-10-04 | End: 2022-10-07 | Stop reason: HOSPADM

## 2022-10-04 RX ORDER — SODIUM CHLORIDE 9 MG/ML
INJECTION, SOLUTION INTRAVENOUS
Status: DISCONTINUED | OUTPATIENT
Start: 2022-10-04 | End: 2022-10-07 | Stop reason: HOSPADM

## 2022-10-04 RX ORDER — HYDROCODONE BITARTRATE AND ACETAMINOPHEN 5; 325 MG/1; MG/1
1 TABLET ORAL EVERY 6 HOURS PRN
Status: DISCONTINUED | OUTPATIENT
Start: 2022-10-04 | End: 2022-10-04 | Stop reason: DRUGHIGH

## 2022-10-04 RX ORDER — FLUOXETINE HYDROCHLORIDE 20 MG/1
20 CAPSULE ORAL DAILY
Status: DISCONTINUED | OUTPATIENT
Start: 2022-10-04 | End: 2022-10-07 | Stop reason: HOSPADM

## 2022-10-04 RX ORDER — NALOXONE HCL 0.4 MG/ML
0.02 VIAL (ML) INJECTION
Status: DISCONTINUED | OUTPATIENT
Start: 2022-10-04 | End: 2022-10-07 | Stop reason: HOSPADM

## 2022-10-04 RX ORDER — PANTOPRAZOLE SODIUM 40 MG/10ML
40 INJECTION, POWDER, LYOPHILIZED, FOR SOLUTION INTRAVENOUS
Status: COMPLETED | OUTPATIENT
Start: 2022-10-04 | End: 2022-10-04

## 2022-10-04 RX ORDER — TALC
6 POWDER (GRAM) TOPICAL NIGHTLY PRN
Status: DISCONTINUED | OUTPATIENT
Start: 2022-10-04 | End: 2022-10-07 | Stop reason: HOSPADM

## 2022-10-04 RX ORDER — MUPIROCIN 20 MG/G
OINTMENT TOPICAL 2 TIMES DAILY
Status: DISCONTINUED | OUTPATIENT
Start: 2022-10-04 | End: 2022-10-07 | Stop reason: HOSPADM

## 2022-10-04 RX ADMIN — FUROSEMIDE 40 MG: 40 TABLET ORAL at 09:10

## 2022-10-04 RX ADMIN — ONDANSETRON 4 MG: 2 INJECTION INTRAMUSCULAR; INTRAVENOUS at 09:10

## 2022-10-04 RX ADMIN — Medication 10 ML: at 09:10

## 2022-10-04 RX ADMIN — ALUMINUM HYDROXIDE, MAGNESIUM HYDROXIDE, AND DIMETHICONE 30 ML: 200; 20; 200 SUSPENSION ORAL at 09:10

## 2022-10-04 RX ADMIN — CARVEDILOL 25 MG: 25 TABLET, FILM COATED ORAL at 09:10

## 2022-10-04 RX ADMIN — LEVETIRACETAM 500 MG: 500 TABLET, FILM COATED ORAL at 09:10

## 2022-10-04 RX ADMIN — HYDRALAZINE HYDROCHLORIDE 100 MG: 25 TABLET, FILM COATED ORAL at 10:10

## 2022-10-04 RX ADMIN — HYDRALAZINE HYDROCHLORIDE 100 MG: 25 TABLET, FILM COATED ORAL at 09:10

## 2022-10-04 RX ADMIN — MORPHINE SULFATE 6 MG: 2 INJECTION, SOLUTION INTRAMUSCULAR; INTRAVENOUS at 09:10

## 2022-10-04 RX ADMIN — PANTOPRAZOLE SODIUM 40 MG: 40 INJECTION, POWDER, LYOPHILIZED, FOR SOLUTION INTRAVENOUS at 09:10

## 2022-10-04 RX ADMIN — APIXABAN 5 MG: 2.5 TABLET, FILM COATED ORAL at 09:10

## 2022-10-04 RX ADMIN — SODIUM CHLORIDE 1000 ML: 0.9 INJECTION, SOLUTION INTRAVENOUS at 09:10

## 2022-10-04 RX ADMIN — INSULIN DETEMIR 5 UNITS: 100 INJECTION, SOLUTION SUBCUTANEOUS at 09:10

## 2022-10-04 RX ADMIN — OXYCODONE HYDROCHLORIDE AND ACETAMINOPHEN 1 TABLET: 5; 325 TABLET ORAL at 02:10

## 2022-10-04 RX ADMIN — INSULIN HUMAN 10 UNITS: 100 INJECTION, SOLUTION PARENTERAL at 10:10

## 2022-10-04 RX ADMIN — HEPARIN SODIUM 4000 UNITS: 1000 INJECTION, SOLUTION INTRAVENOUS; SUBCUTANEOUS at 04:10

## 2022-10-04 NOTE — ASSESSMENT & PLAN NOTE
Emergent hemodialysis is being arranged.  Routine hemodialysis therapy as per Nephrology.  Continue Lasix therapy.

## 2022-10-04 NOTE — PLAN OF CARE
CM attempted to complete DC assessment. Pt in fetal position with 10/10 pain. Primary nurse notified. CM will follow up tomorrow       10/04/22 6482   Discharge Assessment   Assessment Type Discharge Planning Assessment

## 2022-10-04 NOTE — NURSING
Nurses Note -- 4 Eyes      10/4/2022   6:48 PM      Skin assessed during: Admit      [x] No Pressure Injuries Present    []Prevention Measures Documented      [] Yes- Altered Skin Integrity Present or Discovered   [] LDA Added if Not in Epic (Describe Wound)   [] New Altered Skin Integrity was Present on Admit and Documented in LDA   [] Wound Image Taken    Wound Care Consulted? No    Attending Nurse:  Herlinda Lewis RN     Second RN/Staff Member:  Alice Gonzalez RN

## 2022-10-04 NOTE — ASSESSMENT & PLAN NOTE
Continue use of Norvasc, Tenormin, hydralazine, Lasix, Coreg and Imdur as before.  Use intravenous hydralazine 10 mg IV q.4 hours p.r.n. for systolic blood pressure above 160 mmHg.

## 2022-10-04 NOTE — SUBJECTIVE & OBJECTIVE
Past Medical History:   Diagnosis Date    CKD (chronic kidney disease), stage IV 2022    Diabetes mellitus due to underlying condition with unspecified complications 2022    Gastroparesis 2022    Heart failure with preserved ejection fraction 2022    EF 55% on 3/22    History of gastroesophageal reflux (GERD)     History of supraventricular tachycardia     Hyperkalemia 2022    Hypertensive emergency 2022    Sickle cell trait 2022    Type 2 diabetes mellitus        Past Surgical History:   Procedure Laterality Date     SECTION      x 3    COLONOSCOPY      COLONOSCOPY N/A 2022    Procedure: COLONOSCOPY;  Surgeon: Jagdeep Cedeno MD;  Location: [facility];  Service: Endoscopy;  Laterality: N/A;    ESOPHAGOGASTRODUODENOSCOPY N/A 10/18/2019    Procedure: ESOPHAGOGASTRODUODENOSCOPY (EGD);  Surgeon: Gianluca Mendez MD;  Location: [facility];  Service: Endoscopy;  Laterality: N/A;    ESOPHAGOGASTRODUODENOSCOPY N/A 2022    Procedure: EGD (ESOPHAGOGASTRODUODENOSCOPY);  Surgeon: Micky Paredes III, MD;  Location: [facility];  Service: Endoscopy;  Laterality: N/A;    LAPAROSCOPIC CHOLECYSTECTOMY N/A 2022    Procedure: CHOLECYSTECTOMY, LAPAROSCOPIC;  Surgeon: Grey Perez MD;  Location: [facility];  Service: General;  Laterality: N/A;    PLACEMENT OF DUAL-LUMEN VASCULAR CATHETER Left 2022    Procedure: INSERTION-CATHETER-JOSEPH;  Surgeon: Dionte Gan MD;  Location: [facility] OR;  Service: General;  Laterality: Left;    PLACEMENT OF DUAL-LUMEN VASCULAR CATHETER Right 2022    Procedure: INSERTION-CATHETER-Hemosplit;  Surgeon: Dionte Gan MD;  Location: [facility] OR;  Service: General;  Laterality: Right;       Review of patient's allergies indicates:   Allergen Reactions    Penicillins Hives       No current facility-administered medications on file prior to encounter.     Current Outpatient Medications on File Prior to Encounter   Medication Sig    albuterol  (PROVENTIL/VENTOLIN HFA) 90 mcg/actuation inhaler Inhale 2 puffs into the lungs every 6 (six) hours as needed for Wheezing. Rescue    amLODIPine (NORVASC) 10 MG tablet Take 1 tablet (10 mg total) by mouth once daily.    apixaban (ELIQUIS) 5 mg Tab Take 1 tablet (5 mg total) by mouth 2 (two) times daily. For second month on.    ARIPiprazole (ABILIFY) 5 MG Tab Take 5 mg by mouth once daily.    bisacodyL (DULCOLAX) 5 mg EC tablet Take 2 tablets (10 mg total) by mouth daily as needed for Constipation. (Patient not taking: Reported on 8/23/2022)    carvediloL (COREG) 25 MG tablet Take 1 tablet (25 mg total) by mouth 2 (two) times daily.    cloNIDine 0.2 mg/24 hr td ptwk (CATAPRES) 0.2 mg/24 hr Place 1 patch onto the skin every 7 days. (Patient not taking: Reported on 8/23/2022)    desvenlafaxine succinate (PRISTIQ) 50 MG Tb24 Take 50 mg by mouth every other day.    famotidine (PEPCID) 20 MG tablet Take 1 tablet (20 mg total) by mouth once daily.    FLUoxetine 20 MG capsule Take 1 capsule (20 mg total) by mouth once daily.    furosemide (LASIX) 40 MG tablet Take 1 tablet (40 mg total) by mouth 2 (two) times daily. (Patient not taking: No sig reported)    hydrALAZINE (APRESOLINE) 100 MG tablet Take 1 tablet (100 mg total) by mouth every 8 (eight) hours.    insulin aspart U-100 (NOVOLOG) 100 unit/mL (3 mL) InPn pen Inject 1-10 Units into the skin before meals and at bedtime as needed (Hyperglycemia).    isosorbide mononitrate (IMDUR) 60 MG 24 hr tablet Take 2 tablets (120 mg total) by mouth once daily.    LANTUS U-100 INSULIN 100 unit/mL injection Inject 5 Units into the skin once daily.    levETIRAcetam (KEPPRA) 500 MG Tab Take 1 tablet (500 mg total) by mouth 2 (two) times daily.    ondansetron (ZOFRAN-ODT) 4 MG TbDL Take 1 tablet (4 mg total) by mouth every 8 (eight) hours as needed (nausea, vomiting).    oxyCODONE-acetaminophen (PERCOCET) 5-325 mg per tablet Take 1 tablet by mouth every 6 (six) hours as needed for Pain.  (Patient not taking: No sig reported)    pantoprazole (PROTONIX) 40 MG tablet Take 1 tablet (40 mg total) by mouth once daily.    polyethylene glycol (GLYCOLAX) 17 gram/dose powder Take 17 g by mouth once daily. (Patient not taking: No sig reported)    [DISCONTINUED] atenoloL (TENORMIN) 50 MG tablet Take 1 tablet (50 mg total) by mouth every other day.    [DISCONTINUED] omeprazole (PRILOSEC) 20 MG capsule Take 2 capsules (40 mg total) by mouth once daily. for 10 days    [DISCONTINUED] torsemide (DEMADEX) 20 MG Tab Take 1 tablet (20 mg total) by mouth 2 (two) times a day. (Patient taking differently: Take 20 mg by mouth once daily.)     Family History       Problem Relation (Age of Onset)    Diabetes Mother, Father          Tobacco Use    Smoking status: Never    Smokeless tobacco: Never   Substance and Sexual Activity    Alcohol use: No    Drug use: No    Sexual activity: Not Currently     Partners: Male     Birth control/protection: I.U.D.     Review of Systems   Constitutional:  Positive for appetite change and fatigue.   Gastrointestinal:  Positive for abdominal pain, nausea and vomiting.   Musculoskeletal:  Positive for back pain.   Neurological:  Positive for weakness.   All other systems reviewed and are negative.  Objective:     Vital Signs (Most Recent):  Temp: 98.9 °F (37.2 °C) (10/04/22 0820)  Pulse: 78 (10/04/22 1102)  Resp: (!) 21 (10/04/22 1102)  BP: (!) 196/120 (10/04/22 1102)  SpO2: 98 % (10/04/22 1102)   Vital Signs (24h Range):  Temp:  [98.9 °F (37.2 °C)] 98.9 °F (37.2 °C)  Pulse:  [71-78] 78  Resp:  [18-22] 21  SpO2:  [94 %-100 %] 98 %  BP: (185-196)/(111-133) 196/120     Weight: 68 kg (150 lb)  Body mass index is 27.44 kg/m².    Physical Exam  Constitutional:       Appearance: She is well-developed.      Comments: Young  female appears in distress due to abdominal pain   HENT:      Head: Normocephalic and atraumatic.   Eyes:      Conjunctiva/sclera: Conjunctivae normal.       Pupils: Pupils are equal, round, and reactive to light.   Neck:      Thyroid: No thyromegaly.      Vascular: No JVD.   Cardiovascular:      Rate and Rhythm: Normal rate and regular rhythm.      Heart sounds: No murmur heard.    No friction rub. No gallop.   Pulmonary:      Effort: Pulmonary effort is normal.      Breath sounds: Normal breath sounds.   Abdominal:      General: Bowel sounds are normal. There is no distension.      Palpations: Abdomen is soft. There is no mass.      Tenderness: There is no abdominal tenderness.   Musculoskeletal:         General: Normal range of motion.      Cervical back: Neck supple.      Comments: 1+ pitting edema bilateral lower extremities, Homans sign negative bilaterally.   Skin:     General: Skin is warm and dry.   Neurological:      Mental Status: She is oriented to person, place, and time.      Cranial Nerves: No cranial nerve deficit.   Psychiatric:         Behavior: Behavior normal.         CRANIAL NERVES     CN III, IV, VI   Pupils are equal, round, and reactive to light.     Significant Labs: All pertinent labs within the past 24 hours have been reviewed.  CBC:   Recent Labs   Lab 10/04/22  0901   WBC 7.41   HGB 7.8*   HCT 23.1*   *     CMP:   Recent Labs   Lab 10/04/22  0901      K 4.9      CO2 23   *   BUN 35*   CREATININE 4.4*   CALCIUM 9.1   PROT 6.6   ALBUMIN 2.7*   BILITOT 1.8*   ALKPHOS 227*   AST 22   ALT 18   ANIONGAP 16     Lactic Acid: No results for input(s): LACTATE in the last 48 hours.  Lipase:   Recent Labs   Lab 10/04/22  0901   LIPASE 17     Urine Studies: No results for input(s): COLORU, APPEARANCEUA, PHUR, SPECGRAV, PROTEINUA, GLUCUA, KETONESU, BILIRUBINUA, OCCULTUA, NITRITE, UROBILINOGEN, LEUKOCYTESUR, RBCUA, WBCUA, BACTERIA, SQUAMEPITHEL, HYALINECASTS in the last 48 hours.    Invalid input(s): WRIGHTSUR  Betahydroxybutyrate : 2.7    Significant Imaging:   CT abdomen and pelvis without contrast:  Diffuse anasarca.  Small  right pleural effusion.  Moderate pericardial effusion.  Small amount of ascites adjacent to the liver, spleen and in the pelvis.  Prior cholecystectomy.  Mild diverticulosis coli.  Contrast:     CXR: Mild CHF, slightly improved from prior.

## 2022-10-04 NOTE — CONSULTS
INPATIENT NEPHROLOGY CONSULT   [facility] NEPHROLOGY    Tabby Howard  10/04/2022    Reason for consultation:  esrd    Chief Complaint:   Chief Complaint   Patient presents with    Abdominal Pain     With NV that started yesterday. Did not got to dialysis today went Saturday          History of Present Illness:    Pt was dropped off by her dad because he didn't have time to take her to dialysis.  She has a h/o ESRD, hypertension, diabetes, anemia, secondary hyperparathyroidism, SVT, DVT, Cholecystectomy,  and GERD.  She is tearful, complaining of abdominal pain, sob, fatigue.  C/o nausea and vomiting as well.  No angina.  No new neuro symptoms.   Good history hard to obtain given her emotional state          Plan of Care:       Assessment:    esrd  --continue dialysis per routine  --fluid restrict  --renal dose medication per routine  --continue outpt medication  --continue binders with meals    Anemia  --pt with hypervolemia and hypertensive urgency.  Will hold SHELLEY until she is more stable    Hypervolemia  --dialysis today  --isolated ultrafiltration tomorrow  --fluid restrict   --low salt diet    Hypertension  --uf today and tomorrow  --low salt diet  --fluid restrict       Thank you for allowing us to participate in this patient's care. We will continue to follow.    Vital Signs:  Temp Readings from Last 3 Encounters:   10/04/22 98.9 °F (37.2 °C) (Oral)   09/03/22 98.2 °F (36.8 °C) (Oral)   09/02/22 98.8 °F (37.1 °C) (Oral)       Pulse Readings from Last 3 Encounters:   10/04/22 78   09/03/22 84   09/02/22 95       BP Readings from Last 3 Encounters:   10/04/22 (!) 196/120   09/03/22 (!) 161/86   09/02/22 (!) 156/94       Weight:  Wt Readings from Last 3 Encounters:   10/04/22 68 kg (150 lb)   08/26/22 70 kg (154 lb 5.2 oz)   08/09/22 74.8 kg (165 lb)       Past Medical & Surgical History:  Past Medical History:   Diagnosis Date    CKD (chronic kidney disease), stage IV 4/12/2022    Diabetes mellitus due to  underlying condition with unspecified complications 2022    Gastroparesis 2022    Heart failure with preserved ejection fraction 2022    EF 55% on 3/22    History of gastroesophageal reflux (GERD)     History of supraventricular tachycardia     Hyperkalemia 2022    Hypertensive emergency 2022    Sickle cell trait 2022    Type 2 diabetes mellitus        Past Surgical History:   Procedure Laterality Date     SECTION      x 3    COLONOSCOPY      COLONOSCOPY N/A 2022    Procedure: COLONOSCOPY;  Surgeon: Jagdeep Cedeno MD;  Location: [facility];  Service: Endoscopy;  Laterality: N/A;    ESOPHAGOGASTRODUODENOSCOPY N/A 10/18/2019    Procedure: ESOPHAGOGASTRODUODENOSCOPY (EGD);  Surgeon: Gianluca Mendez MD;  Location: [facility];  Service: Endoscopy;  Laterality: N/A;    ESOPHAGOGASTRODUODENOSCOPY N/A 2022    Procedure: EGD (ESOPHAGOGASTRODUODENOSCOPY);  Surgeon: Micky Paredes III, MD;  Location: [facility];  Service: Endoscopy;  Laterality: N/A;    LAPAROSCOPIC CHOLECYSTECTOMY N/A 2022    Procedure: CHOLECYSTECTOMY, LAPAROSCOPIC;  Surgeon: Grey Perez MD;  Location: [facility];  Service: General;  Laterality: N/A;    PLACEMENT OF DUAL-LUMEN VASCULAR CATHETER Left 2022    Procedure: INSERTION-CATHETER-JOSEPH;  Surgeon: Dionte Gan MD;  Location: [facility] OR;  Service: General;  Laterality: Left;    PLACEMENT OF DUAL-LUMEN VASCULAR CATHETER Right 2022    Procedure: INSERTION-CATHETER-Hemosplit;  Surgeon: Dionte Gan MD;  Location: [facility];  Service: General;  Laterality: Right;       Past Social History:  Social History     Socioeconomic History    Marital status:    Tobacco Use    Smoking status: Never    Smokeless tobacco: Never   Substance and Sexual Activity    Alcohol use: No    Drug use: No    Sexual activity: Not Currently     Partners: Male     Birth control/protection: I.U.D.       Medications:  No current facility-administered  medications on file prior to encounter.     Current Outpatient Medications on File Prior to Encounter   Medication Sig Dispense Refill    albuterol (PROVENTIL/VENTOLIN HFA) 90 mcg/actuation inhaler Inhale 2 puffs into the lungs every 6 (six) hours as needed for Wheezing. Rescue      amLODIPine (NORVASC) 10 MG tablet Take 1 tablet (10 mg total) by mouth once daily. 30 tablet 11    apixaban (ELIQUIS) 5 mg Tab Take 1 tablet (5 mg total) by mouth 2 (two) times daily. For second month on. 60 tablet 2    ARIPiprazole (ABILIFY) 5 MG Tab Take 5 mg by mouth once daily.      bisacodyL (DULCOLAX) 5 mg EC tablet Take 2 tablets (10 mg total) by mouth daily as needed for Constipation. (Patient not taking: Reported on 8/23/2022) 14 tablet 0    carvediloL (COREG) 25 MG tablet Take 1 tablet (25 mg total) by mouth 2 (two) times daily. 60 tablet 2    cloNIDine 0.2 mg/24 hr td ptwk (CATAPRES) 0.2 mg/24 hr Place 1 patch onto the skin every 7 days. (Patient not taking: Reported on 8/23/2022) 4 patch 2    desvenlafaxine succinate (PRISTIQ) 50 MG Tb24 Take 50 mg by mouth every other day.      famotidine (PEPCID) 20 MG tablet Take 1 tablet (20 mg total) by mouth once daily. 30 tablet 0    FLUoxetine 20 MG capsule Take 1 capsule (20 mg total) by mouth once daily. 30 capsule 1    furosemide (LASIX) 40 MG tablet Take 1 tablet (40 mg total) by mouth 2 (two) times daily. (Patient not taking: No sig reported) 60 tablet 0    hydrALAZINE (APRESOLINE) 100 MG tablet Take 1 tablet (100 mg total) by mouth every 8 (eight) hours. 90 tablet 2    insulin aspart U-100 (NOVOLOG) 100 unit/mL (3 mL) InPn pen Inject 1-10 Units into the skin before meals and at bedtime as needed (Hyperglycemia). 3 mL 3    isosorbide mononitrate (IMDUR) 60 MG 24 hr tablet Take 2 tablets (120 mg total) by mouth once daily. 30 tablet 1    LANTUS U-100 INSULIN 100 unit/mL injection Inject 5 Units into the skin once daily.      levETIRAcetam (KEPPRA) 500 MG Tab Take 1 tablet (500 mg  "total) by mouth 2 (two) times daily. 60 tablet 1    ondansetron (ZOFRAN-ODT) 4 MG TbDL Take 1 tablet (4 mg total) by mouth every 8 (eight) hours as needed (nausea, vomiting). 12 tablet 0    oxyCODONE-acetaminophen (PERCOCET) 5-325 mg per tablet Take 1 tablet by mouth every 6 (six) hours as needed for Pain. (Patient not taking: No sig reported) 20 tablet 0    pantoprazole (PROTONIX) 40 MG tablet Take 1 tablet (40 mg total) by mouth once daily. 30 tablet 0    polyethylene glycol (GLYCOLAX) 17 gram/dose powder Take 17 g by mouth once daily. (Patient not taking: No sig reported) 510 g 1    [DISCONTINUED] atenoloL (TENORMIN) 50 MG tablet Take 1 tablet (50 mg total) by mouth every other day. 45 tablet 3    [DISCONTINUED] omeprazole (PRILOSEC) 20 MG capsule Take 2 capsules (40 mg total) by mouth once daily. for 10 days 20 capsule 0    [DISCONTINUED] torsemide (DEMADEX) 20 MG Tab Take 1 tablet (20 mg total) by mouth 2 (two) times a day. (Patient taking differently: Take 20 mg by mouth once daily.) 60 tablet 1     Scheduled Meds:   mupirocin   Nasal BID     Continuous Infusions:  PRN Meds:.    Allergies:  Penicillins    Past Family History:  Reviewed; refer to Hospitalist Admission Note    Review of Systems:  Review of Systems - All 14 systems reviewed and negative, except as noted in HPI    Physical Exam:    BP (!) 196/120   Pulse 78   Temp 98.9 °F (37.2 °C) (Oral)   Resp (!) 21   Ht 5' 2" (1.575 m)   Wt 68 kg (150 lb)   SpO2 98%   Breastfeeding No   BMI 27.44 kg/m²     General Appearance:    Tearful    Head:    Normocephalic, without obvious abnormality, atraumatic   Eyes:    PER, conjunctiva/corneas clear, EOM's intact in both eyes        Throat:   Lips, mucosa, and tongue normal; teeth and gums normal   Back:     Symmetric, no curvature, ROM normal, no CVA tenderness   Lungs:     diminished   Chest wall:    No tenderness or deformity   Heart:    Regular rate and rhythm, S1 and S2 normal, no murmur, rub   or " gallop   Abdomen:     Tender to palpation.  Can be distracted   Extremities:   edema   Pulses:   2+ and symmetric all extremities   MSK:   No joint or muscle swelling, tenderness or deformity   Skin:   Skin color, texture, turgor normal, no rashes or lesions   Neurologic:    .  No flap     Results:  Lab Results   Component Value Date     10/04/2022    K 4.9 10/04/2022     10/04/2022    CO2 23 10/04/2022    BUN 35 (H) 10/04/2022    CREATININE 4.4 (H) 10/04/2022    CALCIUM 9.1 10/04/2022    ANIONGAP 16 10/04/2022    ESTGFRAFRICA 20 (A) 07/31/2022    EGFRNONAA 18 (A) 07/31/2022       Lab Results   Component Value Date    CALCIUM 9.1 10/04/2022    PHOS 3.9 08/26/2022       Recent Labs   Lab 10/04/22  0901   WBC 7.41   RBC 2.61*   HGB 7.8*   HCT 23.1*   *   MCV 89   MCH 29.9   MCHC 33.8        Imaging Results              CT Abdomen Pelvis  Without Contrast (Final result)  Result time 10/04/22 11:00:32      Final result by Gianluca Arriaga Jr., MD (10/04/22 11:00:32)                   Impression:      Diffuse anasarca.  Small right pleural effusion.  Moderate pericardial effusion.  Small amount of ascites adjacent to the liver, spleen and in the pelvis.    Prior cholecystectomy.  Mild diverticulosis coli.      Electronically signed by: Gianluca Arriaga MD  Date:    10/04/2022  Time:    11:00               Narrative:    EXAMINATION:  CT ABDOMEN PELVIS WITHOUT CONTRAST    CLINICAL HISTORY:  Nausea/vomiting;    TECHNIQUE:  Low dose axial images, sagittal and coronal reformations were obtained from the lung bases to the pubic symphysis.  30 mL of oral Omnipaque 350 was administered.    COMPARISON:  Chest x-ray of October 4, 2022    FINDINGS:  There is diffuse anasarca throughout the chest abdomen and pelvis.  There is a small right pleural effusion moderate pericardial effusion noted.  Small amount of ascites is seen adjacent to the liver, spleen and in the pelvis.    The liver is of normal size and CT  density.  The gallbladder is absent with surgical clips noted.  The visualized pancreas appears within normal limits.  The spleen is of normal size and CT density.    The adrenal glands are not enlarged.  The kidneys are of normal size and CT density for a noncontrast study.  Stone or hydronephrosis is not seen.  The abdominal aorta and inferior vena cava are of normal caliber.    The stomach is of normal configuration.  Small bowel dilatation or air-fluid levels are not seen.  The colon is of normal configuration with few diverticuli of the sigmoid colon without CT evidence of diverticulitis.    Bladder is of normal contour without mass or asymmetry.  The uterus is within normal limits.                                       X-Ray Chest AP Portable (Final result)  Result time 10/04/22 09:42:05      Final result by Kaushik Gutierrez MD (10/04/22 09:42:05)                   Impression:      Mild CHF, slightly improved from prior.      Electronically signed by: Kaushik Gutierrez  Date:    10/04/2022  Time:    09:42               Narrative:    EXAMINATION:  XR CHEST AP PORTABLE    CLINICAL HISTORY:  hyperglycemia;    TECHNIQUE:  Single frontal view of the chest was performed.    COMPARISON:  09/02/2022    FINDINGS:  Bibasilar interstitial disease.  Enlarged cardiac silhouette.  Right-sided central line with tip projecting over the right heart border presumably in the right atrium.  No pleural effusion or pneumothorax.                                        I have personally reviewed pertinent radiological imaging and reports.    Patient care was time spent personally by me on the following activities:   Obtaining a history  Examination of patient.  Providing medical care at the patients bedside.  Developing a treatment plan with patient or surrogate and bedside caregivers  Ordering and reviewing laboratory studies, radiographic studies, pulse oximetry.  Ordering and performing treatments and interventions.  Evaluation  of patient's response to treatment.  Discussions with consultants while on the unit and immediately available to the patient.  Re-evaluation of the patient's condition.  Documentation in the medical record.     Hugh Figueroa MD  Nephrology  [facility]  [phone]

## 2022-10-04 NOTE — ASSESSMENT & PLAN NOTE
Patient's FSGs are uncontrolled due to hyperglycemia on current medication regimen.  Last A1c reviewed-   Lab Results   Component Value Date    HGBA1C 6.2 08/24/2022     Most recent fingerstick glucose reviewed-   Recent Labs   Lab 10/04/22  0845   POCTGLUCOSE 430*     Current correctional scale  Medium  Increase anti-hyperglycemic dose as follows-   Antihyperglycemics (From admission, onward)    None        Hold Oral hypoglycemics while patient is in the hospital.  Check BMP in the evening and repeat serum ketones in the morning.  If no improvement in glycemic control, will consider insulin infusion therapy.

## 2022-10-04 NOTE — HPI
Patient is a 32-year-old  female with past medical history significant for end-stage renal disease (on hemodialysis Tuesday/Thursday/Saturday), hypertension, uncontrolled diabetes mellitus type 2, seizure disorder, gastroparesis, history of deep venous thrombosis and secondary parathyroidism who is being admitted to Hospital Medicine under inpatient status from [facility] Emergency room with complaints of diffuse abdominal pain associated with nausea and vomiting for 1-2 days.  Patient reports compliance with hemodialysis therapy and was expected to undergo hemodialysis earlier this morning.  It reportedly patient's father dropped her off to the emergency room.  Patient is complaining of diffuse abdominal pain for which patient received intravenous morphine.  Patient also reports associated nausea and 1 episode of emesis.  Patient denies any hematemesis, hemoptysis, melena or bleeding per rectum.  No recent fevers or chills reported.  Denies any urinary complaints.  Upon arrival, patient noted to have elevated blood pressure of 195/111 mmHg.  Dr. Figueroa from Nephrology is arranging emergent hemodialysis treatment.

## 2022-10-04 NOTE — ED NOTES
Patient crying stating her dad would not take her to dialysis today at 11:30 and instead dropped her off here at the hospital for treatment, patient states she is in 10-10 pain and crying upon arrival

## 2022-10-04 NOTE — ASSESSMENT & PLAN NOTE
Noted.  Continue supportive care with sparing use of narcotics.  Continue use of antiemetics and proton pump inhibitor.

## 2022-10-04 NOTE — ED PROVIDER NOTES
Encounter Date: 10/4/2022    SCRIBE #1 NOTE: I, Shelbi Islas, am scribing for, and in the presence of,  Gurmeet Barrera MD.     History     Chief Complaint   Patient presents with    Abdominal Pain     With NV that started yesterday. Did not got to dialysis today went Saturday       Time seen by provider: 8:46 AM on 10/04/2022    Tabby Howard is a 32 y.o. female with a PMHx of DM II, GERD, Sickle cell trait, gastroparesis, CKD and hypertensive emergency who presents to the ED for evaluation of diffuse acute on chronic abdominal pain that onset yesterday with associated N/V, back pain and myalgias.  Patient has an 11:15 am dialysis appointment today and was last dialyzed Saturday.  She expresses that she lives in [place] with her father and notes he could not bring her to the dialysis appointment today secondary to prior obligations.  Patient states that she took her daily medications last night as well as her antihypertensive medications this morning.  She confirms accompanying SOB and chronic decreased urine secondary to dialysis.  The patient denies a fever, diarrhea or any other symptoms at this time.  PSHx includes EGD, , dual-lumen vascular catheter placement, laparoscopic cholecystectomy, colonoscopy.      The history is provided by the patient.   Review of patient's allergies indicates:   Allergen Reactions    Penicillins Hives     Past Medical History:   Diagnosis Date    CKD (chronic kidney disease), stage IV 2022    Diabetes mellitus due to underlying condition with unspecified complications 2022    Gastroparesis 2022    Heart failure with preserved ejection fraction 2022    EF 55% on 3/22    History of gastroesophageal reflux (GERD)     History of supraventricular tachycardia     Hyperkalemia 2022    Hypertensive emergency 2022    Sickle cell trait 2022    Type 2 diabetes mellitus      Past Surgical History:   Procedure Laterality Date      SECTION      x 3    COLONOSCOPY      COLONOSCOPY N/A 8/25/2022    Procedure: COLONOSCOPY;  Surgeon: Jagdeep Cedeno MD;  Location: [facility];  Service: Endoscopy;  Laterality: N/A;    ESOPHAGOGASTRODUODENOSCOPY N/A 10/18/2019    Procedure: ESOPHAGOGASTRODUODENOSCOPY (EGD);  Surgeon: Gianluca Mendez MD;  Location: [facility];  Service: Endoscopy;  Laterality: N/A;    ESOPHAGOGASTRODUODENOSCOPY N/A 08/24/2022    Procedure: EGD (ESOPHAGOGASTRODUODENOSCOPY);  Surgeon: Micky Paredes III, MD;  Location: [facility];  Service: Endoscopy;  Laterality: N/A;    LAPAROSCOPIC CHOLECYSTECTOMY N/A 07/30/2022    Procedure: CHOLECYSTECTOMY, LAPAROSCOPIC;  Surgeon: Grey Perez MD;  Location: [facility];  Service: General;  Laterality: N/A;    PLACEMENT OF DUAL-LUMEN VASCULAR CATHETER Left 07/12/2022    Procedure: INSERTION-CATHETER-JOSEPH;  Surgeon: Dionte Gan MD;  Location: [facility];  Service: General;  Laterality: Left;    PLACEMENT OF DUAL-LUMEN VASCULAR CATHETER Right 07/26/2022    Procedure: INSERTION-CATHETER-Hemosplit;  Surgeon: Dionte Gan MD;  Location: [facility];  Service: General;  Laterality: Right;     Family History   Problem Relation Age of Onset    Diabetes Mother     Diabetes Father      Social History     Tobacco Use    Smoking status: Never    Smokeless tobacco: Never   Substance Use Topics    Alcohol use: No    Drug use: No     Review of Systems   Constitutional:  Negative for fever.   HENT:  Negative for sore throat.    Respiratory:  Positive for shortness of breath.    Cardiovascular:  Negative for chest pain.   Gastrointestinal:  Positive for abdominal pain, nausea and vomiting. Negative for diarrhea.   Genitourinary:  Positive for decreased urine volume (chronic). Negative for dysuria.   Musculoskeletal:  Positive for back pain and myalgias.   Skin:  Negative for rash.   Neurological:  Negative for weakness.   Hematological:  Does not bruise/bleed easily.     Physical Exam     Initial Vitals   BP  Pulse Resp Temp SpO2   10/04/22 0821 10/04/22 0821 10/04/22 0820 10/04/22 0820 10/04/22 0820   (!) 195/111 72 (!) 22 98.9 °F (37.2 °C) (!) 94 %      MAP       --                Physical Exam    Nursing note and vitals reviewed.  Constitutional: Vital signs are normal. She appears well-developed and well-nourished.  Non-toxic appearance. No distress.   Tearful on exam and appears to be in pain.    HENT:   Head: Normocephalic and atraumatic.   Eyes: EOM are normal. Pupils are equal, round, and reactive to light.   Neck: Neck supple. No JVD present.   Normal range of motion.  Cardiovascular:  Normal rate, regular rhythm, normal heart sounds and intact distal pulses.     Exam reveals no gallop and no friction rub.       No murmur heard.  Pulmonary/Chest: Breath sounds normal. She has no wheezes. She has no rhonchi. She has no rales.   Abdominal: Abdomen is soft. Bowel sounds are normal. There is abdominal tenderness (diffuse).   No rigidity noted on exam.   There is no rebound and no guarding.   Musculoskeletal:         General: Normal range of motion.      Cervical back: Normal range of motion and neck supple. No rigidity.     Neurological: She is alert and oriented to person, place, and time. She has normal strength and normal reflexes. No cranial nerve deficit or sensory deficit. She exhibits normal muscle tone. Coordination normal. GCS eye subscore is 4. GCS verbal subscore is 5. GCS motor subscore is 6.   Skin: Skin is warm and dry.   Psychiatric: She has a normal mood and affect. Her speech is normal and behavior is normal. She is not actively hallucinating.       ED Course   Procedures  Labs Reviewed   CBC W/ AUTO DIFFERENTIAL - Abnormal; Notable for the following components:       Result Value    RBC 2.61 (*)     Hemoglobin 7.8 (*)     Hematocrit 23.1 (*)     RDW 15.5 (*)     Platelets 127 (*)     Lymph # 0.6 (*)     Mono # 0.2 (*)     Gran % 87.9 (*)     Lymph % 8.0 (*)     Mono % 3.0 (*)     All other  components within normal limits   COMPREHENSIVE METABOLIC PANEL - Abnormal; Notable for the following components:    Glucose 472 (*)     BUN 35 (*)     Creatinine 4.4 (*)     Albumin 2.7 (*)     Total Bilirubin 1.8 (*)     Alkaline Phosphatase 227 (*)     eGFR 13 (*)     All other components within normal limits    Narrative:      glucose  critical result(s) called and verbal readback obtained from   Beulah Gabriel by RUTH 10/04/2022 09:47   BETA - HYDROXYBUTYRATE, SERUM - Abnormal; Notable for the following components:    Beta-Hydroxybutyrate 2.7 (*)     All other components within normal limits   POCT GLUCOSE - Abnormal; Notable for the following components:    POCT Glucose 430 (*)     All other components within normal limits   ISTAT PROCEDURE - Abnormal; Notable for the following components:    POC PCO2 54.4 (*)     POC PO2 26 (*)     POC HCO3 32.0 (*)     POC SATURATED O2 44 (*)     POC TCO2 34 (*)     All other components within normal limits   POCT GLUCOSE - Abnormal; Notable for the following components:    POCT Glucose 327 (*)     All other components within normal limits   LIPASE   URINALYSIS, REFLEX TO URINE CULTURE   POCT GLUCOSE MONITORING CONTINUOUS     EKG Readings: (Independently Interpreted)   Initial Reading: No STEMI.   Normal sinus rhythm at a rate of 71 bpm with QT prolongation at 515 ms, normal ST segments and normal T waves.    ECG Results              EKG 12-lead (Final result)  Result time 10/04/22 12:34:13      Final result by Interface, Lab In [facility] (10/04/22 12:34:13)                   Narrative:    Test Reason : R73.9,    Vent. Rate : 071 BPM     Atrial Rate : 071 BPM     P-R Int : 174 ms          QRS Dur : 082 ms      QT Int : 474 ms       P-R-T Axes : 055 004 049 degrees     QTc Int : 515 ms    Normal sinus rhythm  Nonspecific ST abnormality  Prolonged QT  Abnormal ECG  When compared with ECG of 04-OCT-2022 09:37,  Previous ECG has undetermined rhythm, needs review  Confirmed by  Jason Trevino MD ([id number]) on 10/4/2022 12:34:04 PM    Referred By: [email or web address]   SELF           Confirmed By:Jason Trevino MD                                  Imaging Results              CT Abdomen Pelvis  Without Contrast (Final result)  Result time 10/04/22 11:00:32      Final result by Gianluca Arriaga Jr., MD (10/04/22 11:00:32)                   Impression:      Diffuse anasarca.  Small right pleural effusion.  Moderate pericardial effusion.  Small amount of ascites adjacent to the liver, spleen and in the pelvis.    Prior cholecystectomy.  Mild diverticulosis coli.      Electronically signed by: Gianluca Arriaga MD  Date:    10/04/2022  Time:    11:00               Narrative:    EXAMINATION:  CT ABDOMEN PELVIS WITHOUT CONTRAST    CLINICAL HISTORY:  Nausea/vomiting;    TECHNIQUE:  Low dose axial images, sagittal and coronal reformations were obtained from the lung bases to the pubic symphysis.  30 mL of oral Omnipaque 350 was administered.    COMPARISON:  Chest x-ray of October 4, 2022    FINDINGS:  There is diffuse anasarca throughout the chest abdomen and pelvis.  There is a small right pleural effusion moderate pericardial effusion noted.  Small amount of ascites is seen adjacent to the liver, spleen and in the pelvis.    The liver is of normal size and CT density.  The gallbladder is absent with surgical clips noted.  The visualized pancreas appears within normal limits.  The spleen is of normal size and CT density.    The adrenal glands are not enlarged.  The kidneys are of normal size and CT density for a noncontrast study.  Stone or hydronephrosis is not seen.  The abdominal aorta and inferior vena cava are of normal caliber.    The stomach is of normal configuration.  Small bowel dilatation or air-fluid levels are not seen.  The colon is of normal configuration with few diverticuli of the sigmoid colon without CT evidence of diverticulitis.    Bladder is of normal contour without mass or  asymmetry.  The uterus is within normal limits.                                       X-Ray Chest AP Portable (Final result)  Result time 10/04/22 09:42:05      Final result by Kaushik Gutierrez MD (10/04/22 09:42:05)                   Impression:      Mild CHF, slightly improved from prior.      Electronically signed by: Kaushik Gutierrez  Date:    10/04/2022  Time:    09:42               Narrative:    EXAMINATION:  XR CHEST AP PORTABLE    CLINICAL HISTORY:  hyperglycemia;    TECHNIQUE:  Single frontal view of the chest was performed.    COMPARISON:  09/02/2022    FINDINGS:  Bibasilar interstitial disease.  Enlarged cardiac silhouette.  Right-sided central line with tip projecting over the right heart border presumably in the right atrium.  No pleural effusion or pneumothorax.                                       Medications   mupirocin 2 % ointment (has no administration in time range)   sodium chloride 0.9% bolus 1,000 mL (0 mLs Intravenous Stopped 10/4/22 1209)   morphine injection 6 mg (6 mg Intravenous Given 10/4/22 0922)   ondansetron injection 4 mg (4 mg Intravenous Given 10/4/22 0922)   aluminum-magnesium hydroxide-simethicone 200-200-20 mg/5 mL suspension 30 mL (30 mLs Oral Given 10/4/22 0920)   pantoprazole injection 40 mg (40 mg Intravenous Given 10/4/22 0921)   hydrALAZINE tablet 100 mg (100 mg Oral Given 10/4/22 1019)   insulin regular injection 10 Units 0.1 mL (10 Units Intravenous Given 10/4/22 1020)     Medical Decision Making:   History:   Old Medical Records: I decided to obtain old medical records.  Initial Assessment:   Patient with end-stage renal disease on hemodialysis, gastroparesis presents emergency department for volume overload and abdominal pain.  She is found to have malignant hypertension resistant to 100 mg of hydralazine orally.  She needs emergent dialysis and has findings consistent with volume overload on CT scan of the abdomen.  She has hyperglycemia without evidence of  diabetic ketoacidosis.  She is given IV fluids.  She is given pain medicine for abdominal pain.  She does not have any surgical abnormalities and the abdomen on CT.  Discussed with Dr. Bradford who will perform hemodialysis.  Discussed Hospital Medicine agrees to admit the patient for further treatment.  Independently Interpreted Test(s):   I have ordered and independently interpreted X-rays - see prior notes.  Clinical Tests:   Lab Tests: Ordered and Reviewed  Radiological Study: Ordered and Reviewed  Medical Tests: Ordered and Reviewed        Scribe Attestation:   Scribe #1: I performed the above scribed service and the documentation accurately describes the services I performed. I attest to the accuracy of the note.                 I, Bruce Lopez, personally performed the services described in this documentation. All medical record entries made by the scribe were at my direction and in my presence.  I have reviewed the chart and agree that the record reflects my personal performance and is accurate and complete. Gurmeet Barrera MD.    Clinical Impression:   Final diagnoses:  [R73.9] Hyperglycemia  [E87.70] Hypervolemia, unspecified hypervolemia type (Primary)  [R60.1] Anasarca  [N18.6, Z99.2] ESRD (end stage renal disease) on dialysis  [R10.84] Diffuse abdominal pain      ED Disposition Condition    Admit                 Gurmeet Barrera MD  10/04/22 [id number]

## 2022-10-04 NOTE — ASSESSMENT & PLAN NOTE
Unspecified.  CT abdomen and pelvis without contrast results reviewed, no acute intra-abdominal process noted.  Symptoms are likely related to underlying gastroparesis versus possibility of diabetic ketoacidosis.  Continue supportive care with intravenous narcotics-sparing use.  Use antiemetics as needed.

## 2022-10-04 NOTE — H&P
[facility] Emergency Dept  [facility]  History & Physical    Patient Name: Tabby Howard  MRN: [id number]  Patient Class: Emergency  Admission Date: 10/4/2022  Attending Physician: Tosin Roberts MD   Primary Care Provider: [facility]         Patient information was obtained from patient and ER records.     Subjective:     Principal Problem:Generalized abdominal pain    Chief Complaint:   Chief Complaint   Patient presents with    Abdominal Pain     With NV that started yesterday. Did not got to dialysis today went Saturday        HPI: Patient is a 32-year-old  female with past medical history significant for end-stage renal disease (on hemodialysis Tuesday/Thursday/Saturday), hypertension, uncontrolled diabetes mellitus type 2, seizure disorder, gastroparesis, history of deep venous thrombosis and secondary parathyroidism who is being admitted to Hospital Medicine under inpatient status from [facility] Emergency room with complaints of diffuse abdominal pain associated with nausea and vomiting for 1-2 days.  Patient reports compliance with hemodialysis therapy and was expected to undergo hemodialysis earlier this morning.  It reportedly patient's father dropped her off to the emergency room.  Patient is complaining of diffuse abdominal pain for which patient received intravenous morphine.  Patient also reports associated nausea and 1 episode of emesis.  Patient denies any hematemesis, hemoptysis, melena or bleeding per rectum.  No recent fevers or chills reported.  Denies any urinary complaints.  Upon arrival, patient noted to have elevated blood pressure of 195/111 mmHg.  Dr. Figueroa from Nephrology is arranging emergent hemodialysis treatment.            Past Medical History:   Diagnosis Date    CKD (chronic kidney disease), stage IV 4/12/2022    Diabetes mellitus due to underlying condition with unspecified complications 4/12/2022     Gastroparesis 2022    Heart failure with preserved ejection fraction 2022    EF 55% on 3/22    History of gastroesophageal reflux (GERD)     History of supraventricular tachycardia     Hyperkalemia 2022    Hypertensive emergency 2022    Sickle cell trait 2022    Type 2 diabetes mellitus        Past Surgical History:   Procedure Laterality Date     SECTION      x 3    COLONOSCOPY      COLONOSCOPY N/A 2022    Procedure: COLONOSCOPY;  Surgeon: Jagdeep Cedeno MD;  Location: [facility];  Service: Endoscopy;  Laterality: N/A;    ESOPHAGOGASTRODUODENOSCOPY N/A 10/18/2019    Procedure: ESOPHAGOGASTRODUODENOSCOPY (EGD);  Surgeon: Gianluca Mendez MD;  Location: [facility];  Service: Endoscopy;  Laterality: N/A;    ESOPHAGOGASTRODUODENOSCOPY N/A 2022    Procedure: EGD (ESOPHAGOGASTRODUODENOSCOPY);  Surgeon: Micky Paredes III, MD;  Location: [facility];  Service: Endoscopy;  Laterality: N/A;    LAPAROSCOPIC CHOLECYSTECTOMY N/A 2022    Procedure: CHOLECYSTECTOMY, LAPAROSCOPIC;  Surgeon: Grey Perez MD;  Location: [facility] OR;  Service: General;  Laterality: N/A;    PLACEMENT OF DUAL-LUMEN VASCULAR CATHETER Left 2022    Procedure: INSERTION-CATHETER-JOSEPH;  Surgeon: Dionte Gan MD;  Location: [facility] OR;  Service: General;  Laterality: Left;    PLACEMENT OF DUAL-LUMEN VASCULAR CATHETER Right 2022    Procedure: INSERTION-CATHETER-Hemosplit;  Surgeon: Dionte Gan MD;  Location: [facility] OR;  Service: General;  Laterality: Right;       Review of patient's allergies indicates:   Allergen Reactions    Penicillins Hives       No current facility-administered medications on file prior to encounter.     Current Outpatient Medications on File Prior to Encounter   Medication Sig    albuterol (PROVENTIL/VENTOLIN HFA) 90 mcg/actuation inhaler Inhale 2 puffs into the lungs every 6 (six) hours as needed for Wheezing. Rescue    amLODIPine (NORVASC) 10 MG  tablet Take 1 tablet (10 mg total) by mouth once daily.    apixaban (ELIQUIS) 5 mg Tab Take 1 tablet (5 mg total) by mouth 2 (two) times daily. For second month on.    ARIPiprazole (ABILIFY) 5 MG Tab Take 5 mg by mouth once daily.    bisacodyL (DULCOLAX) 5 mg EC tablet Take 2 tablets (10 mg total) by mouth daily as needed for Constipation. (Patient not taking: Reported on 8/23/2022)    carvediloL (COREG) 25 MG tablet Take 1 tablet (25 mg total) by mouth 2 (two) times daily.    cloNIDine 0.2 mg/24 hr td ptwk (CATAPRES) 0.2 mg/24 hr Place 1 patch onto the skin every 7 days. (Patient not taking: Reported on 8/23/2022)    desvenlafaxine succinate (PRISTIQ) 50 MG Tb24 Take 50 mg by mouth every other day.    famotidine (PEPCID) 20 MG tablet Take 1 tablet (20 mg total) by mouth once daily.    FLUoxetine 20 MG capsule Take 1 capsule (20 mg total) by mouth once daily.    furosemide (LASIX) 40 MG tablet Take 1 tablet (40 mg total) by mouth 2 (two) times daily. (Patient not taking: No sig reported)    hydrALAZINE (APRESOLINE) 100 MG tablet Take 1 tablet (100 mg total) by mouth every 8 (eight) hours.    insulin aspart U-100 (NOVOLOG) 100 unit/mL (3 mL) InPn pen Inject 1-10 Units into the skin before meals and at bedtime as needed (Hyperglycemia).    isosorbide mononitrate (IMDUR) 60 MG 24 hr tablet Take 2 tablets (120 mg total) by mouth once daily.    LANTUS U-100 INSULIN 100 unit/mL injection Inject 5 Units into the skin once daily.    levETIRAcetam (KEPPRA) 500 MG Tab Take 1 tablet (500 mg total) by mouth 2 (two) times daily.    ondansetron (ZOFRAN-ODT) 4 MG TbDL Take 1 tablet (4 mg total) by mouth every 8 (eight) hours as needed (nausea, vomiting).    oxyCODONE-acetaminophen (PERCOCET) 5-325 mg per tablet Take 1 tablet by mouth every 6 (six) hours as needed for Pain. (Patient not taking: No sig reported)    pantoprazole (PROTONIX) 40 MG tablet Take 1 tablet (40 mg total) by mouth once daily.    polyethylene  glycol (GLYCOLAX) 17 gram/dose powder Take 17 g by mouth once daily. (Patient not taking: No sig reported)    [DISCONTINUED] atenoloL (TENORMIN) 50 MG tablet Take 1 tablet (50 mg total) by mouth every other day.    [DISCONTINUED] omeprazole (PRILOSEC) 20 MG capsule Take 2 capsules (40 mg total) by mouth once daily. for 10 days    [DISCONTINUED] torsemide (DEMADEX) 20 MG Tab Take 1 tablet (20 mg total) by mouth 2 (two) times a day. (Patient taking differently: Take 20 mg by mouth once daily.)     Family History       Problem Relation (Age of Onset)    Diabetes Mother, Father          Tobacco Use    Smoking status: Never    Smokeless tobacco: Never   Substance and Sexual Activity    Alcohol use: No    Drug use: No    Sexual activity: Not Currently     Partners: Male     Birth control/protection: I.U.D.     Review of Systems   Constitutional:  Positive for appetite change and fatigue.   Gastrointestinal:  Positive for abdominal pain, nausea and vomiting.   Musculoskeletal:  Positive for back pain.   Neurological:  Positive for weakness.   All other systems reviewed and are negative.  Objective:     Vital Signs (Most Recent):  Temp: 98.9 °F (37.2 °C) (10/04/22 0820)  Pulse: 78 (10/04/22 1102)  Resp: (!) 21 (10/04/22 1102)  BP: (!) 196/120 (10/04/22 1102)  SpO2: 98 % (10/04/22 1102)   Vital Signs (24h Range):  Temp:  [98.9 °F (37.2 °C)] 98.9 °F (37.2 °C)  Pulse:  [71-78] 78  Resp:  [18-22] 21  SpO2:  [94 %-100 %] 98 %  BP: (185-196)/(111-133) 196/120     Weight: 68 kg (150 lb)  Body mass index is 27.44 kg/m².    Physical Exam  Constitutional:       Appearance: She is well-developed.      Comments: Young  female appears in distress due to abdominal pain   HENT:      Head: Normocephalic and atraumatic.   Eyes:      Conjunctiva/sclera: Conjunctivae normal.      Pupils: Pupils are equal, round, and reactive to light.   Neck:      Thyroid: No thyromegaly.      Vascular: No JVD.   Cardiovascular:       Rate and Rhythm: Normal rate and regular rhythm.      Heart sounds: No murmur heard.    No friction rub. No gallop.   Pulmonary:      Effort: Pulmonary effort is normal.      Breath sounds: Normal breath sounds.   Abdominal:      General: Bowel sounds are normal. There is no distension.      Palpations: Abdomen is soft. There is no mass.      Tenderness: There is no abdominal tenderness.   Musculoskeletal:         General: Normal range of motion.      Cervical back: Neck supple.      Comments: 1+ pitting edema bilateral lower extremities, Homans sign negative bilaterally.   Skin:     General: Skin is warm and dry.   Neurological:      Mental Status: She is oriented to person, place, and time.      Cranial Nerves: No cranial nerve deficit.   Psychiatric:         Behavior: Behavior normal.         CRANIAL NERVES     CN III, IV, VI   Pupils are equal, round, and reactive to light.     Significant Labs: All pertinent labs within the past 24 hours have been reviewed.  CBC:   Recent Labs   Lab 10/04/22  0901   WBC 7.41   HGB 7.8*   HCT 23.1*   *     CMP:   Recent Labs   Lab 10/04/22  0901      K 4.9      CO2 23   *   BUN 35*   CREATININE 4.4*   CALCIUM 9.1   PROT 6.6   ALBUMIN 2.7*   BILITOT 1.8*   ALKPHOS 227*   AST 22   ALT 18   ANIONGAP 16     Lactic Acid: No results for input(s): LACTATE in the last 48 hours.  Lipase:   Recent Labs   Lab 10/04/22  0901   LIPASE 17     Urine Studies: No results for input(s): COLORU, APPEARANCEUA, PHUR, SPECGRAV, PROTEINUA, GLUCUA, KETONESU, BILIRUBINUA, OCCULTUA, NITRITE, UROBILINOGEN, LEUKOCYTESUR, RBCUA, WBCUA, BACTERIA, SQUAMEPITHEL, HYALINECASTS in the last 48 hours.    Invalid input(s): WRIGHTSUR  Betahydroxybutyrate : 2.7    Significant Imaging:   CT abdomen and pelvis without contrast:  Diffuse anasarca.  Small right pleural effusion.  Moderate pericardial effusion.  Small amount of ascites adjacent to the liver, spleen and in the pelvis.  Prior  cholecystectomy.  Mild diverticulosis coli.  Contrast:     CXR: Mild CHF, slightly improved from prior.    Assessment/Plan:     Volume overload  Emergent hemodialysis is being arranged.  Routine hemodialysis therapy as per Nephrology.  Continue Lasix therapy.      HTN (hypertension)  Continue use of Norvasc, Tenormin, hydralazine, Lasix, Coreg and Imdur as before.  Use intravenous hydralazine 10 mg IV q.4 hours p.r.n. for systolic blood pressure above 160 mmHg.      Deep vein thrombosis (DVT) of non-extremity vein  Continue Eliquis as before.      ESRD (end stage renal disease)  Emergent hemodialysis is being arranged by Dr. Figueroa.  Follow Nephrology recommendation      Seizure  Continue Keppra use as before.  Also her fall and seizure precautions.      Diabetes mellitus due to underlying condition with unspecified complications  Patient's FSGs are uncontrolled due to hyperglycemia on current medication regimen.  Last A1c reviewed-   Lab Results   Component Value Date    HGBA1C 6.2 08/24/2022     Most recent fingerstick glucose reviewed-   Recent Labs   Lab 10/04/22  0845   POCTGLUCOSE 430*     Current correctional scale  Medium  Increase anti-hyperglycemic dose as follows-   Antihyperglycemics (From admission, onward)    None        Hold Oral hypoglycemics while patient is in the hospital.  Check BMP in the evening and repeat serum ketones in the morning.  If no improvement in glycemic control, will consider insulin infusion therapy.    Gastroparesis  Noted.  Continue supportive care with sparing use of narcotics.  Continue use of antiemetics and proton pump inhibitor.      Generalized abdominal pain  Unspecified.  CT abdomen and pelvis without contrast results reviewed, no acute intra-abdominal process noted.  Symptoms are likely related to underlying gastroparesis versus possibility of diabetic ketoacidosis.  Continue supportive care with intravenous narcotics-sparing use.  Use antiemetics as needed.        DVT  prophylaxis:  Use sequential compression stockings and Jason hose.  On Eliquis.    Tosin Roberts MD  Department of Hospital Medicine   [facility] - Emergency Dept

## 2022-10-05 PROBLEM — E44.0 MALNUTRITION OF MODERATE DEGREE: Status: ACTIVE | Noted: 2022-10-05

## 2022-10-05 LAB
ALBUMIN SERPL BCP-MCNC: 2.3 G/DL (ref 3.5–5.2)
ALP SERPL-CCNC: 179 U/L (ref 55–135)
ALT SERPL W/O P-5'-P-CCNC: 16 U/L (ref 10–44)
ANION GAP SERPL CALC-SCNC: 12 MMOL/L (ref 8–16)
AST SERPL-CCNC: 21 U/L (ref 10–40)
BASOPHILS # BLD AUTO: 0.04 K/UL (ref 0–0.2)
BASOPHILS NFR BLD: 0.6 % (ref 0–1.9)
BILIRUB SERPL-MCNC: 1.3 MG/DL (ref 0.1–1)
BUN SERPL-MCNC: 23 MG/DL (ref 6–20)
CALCIUM SERPL-MCNC: 8.8 MG/DL (ref 8.7–10.5)
CHLORIDE SERPL-SCNC: 104 MMOL/L (ref 95–110)
CO2 SERPL-SCNC: 23 MMOL/L (ref 23–29)
CREAT SERPL-MCNC: 3.1 MG/DL (ref 0.5–1.4)
DIFFERENTIAL METHOD: ABNORMAL
EOSINOPHIL # BLD AUTO: 0.1 K/UL (ref 0–0.5)
EOSINOPHIL NFR BLD: 1.1 % (ref 0–8)
ERYTHROCYTE [DISTWIDTH] IN BLOOD BY AUTOMATED COUNT: 15.2 % (ref 11.5–14.5)
EST. GFR  (NO RACE VARIABLE): 20 ML/MIN/1.73 M^2
GLUCOSE SERPL-MCNC: 171 MG/DL (ref 70–110)
HCT VFR BLD AUTO: 21.4 % (ref 37–48.5)
HGB BLD-MCNC: 7.3 G/DL (ref 12–16)
IMM GRANULOCYTES # BLD AUTO: 0.03 K/UL (ref 0–0.04)
IMM GRANULOCYTES NFR BLD AUTO: 0.4 % (ref 0–0.5)
IRON SERPL-MCNC: 52 UG/DL (ref 30–160)
LYMPHOCYTES # BLD AUTO: 0.9 K/UL (ref 1–4.8)
LYMPHOCYTES NFR BLD: 12.3 % (ref 18–48)
MAGNESIUM SERPL-MCNC: 1.9 MG/DL (ref 1.6–2.6)
MCH RBC QN AUTO: 29.3 PG (ref 27–31)
MCHC RBC AUTO-ENTMCNC: 34.1 G/DL (ref 32–36)
MCV RBC AUTO: 86 FL (ref 82–98)
MONOCYTES # BLD AUTO: 0.5 K/UL (ref 0.3–1)
MONOCYTES NFR BLD: 6.5 % (ref 4–15)
NEUTROPHILS # BLD AUTO: 5.6 K/UL (ref 1.8–7.7)
NEUTROPHILS NFR BLD: 79.1 % (ref 38–73)
NRBC BLD-RTO: 0 /100 WBC
PHOSPHATE SERPL-MCNC: 4.3 MG/DL (ref 2.7–4.5)
PLATELET # BLD AUTO: 150 K/UL (ref 150–450)
PMV BLD AUTO: 10.9 FL (ref 9.2–12.9)
POCT GLUCOSE: 130 MG/DL (ref 70–110)
POCT GLUCOSE: 163 MG/DL (ref 70–110)
POCT GLUCOSE: 170 MG/DL (ref 70–110)
POCT GLUCOSE: 203 MG/DL (ref 70–110)
POTASSIUM SERPL-SCNC: 4.1 MMOL/L (ref 3.5–5.1)
PROT SERPL-MCNC: 5.7 G/DL (ref 6–8.4)
RBC # BLD AUTO: 2.49 M/UL (ref 4–5.4)
SATURATED IRON: 21 % (ref 20–50)
SODIUM SERPL-SCNC: 139 MMOL/L (ref 136–145)
TOTAL IRON BINDING CAPACITY: 246 UG/DL (ref 250–450)
TRANSFERRIN SERPL-MCNC: 166 MG/DL (ref 200–375)
WBC # BLD AUTO: 7.08 K/UL (ref 3.9–12.7)

## 2022-10-05 PROCEDURE — 11000001 HC ACUTE MED/SURG PRIVATE ROOM

## 2022-10-05 PROCEDURE — 84100 ASSAY OF PHOSPHORUS: CPT | Performed by: INTERNAL MEDICINE

## 2022-10-05 PROCEDURE — 80053 COMPREHEN METABOLIC PANEL: CPT | Performed by: INTERNAL MEDICINE

## 2022-10-05 PROCEDURE — 83735 ASSAY OF MAGNESIUM: CPT | Performed by: INTERNAL MEDICINE

## 2022-10-05 PROCEDURE — 80100014 HC HEMODIALYSIS 1:1

## 2022-10-05 PROCEDURE — 94761 N-INVAS EAR/PLS OXIMETRY MLT: CPT

## 2022-10-05 PROCEDURE — 63600175 PHARM REV CODE 636 W HCPCS: Performed by: INTERNAL MEDICINE

## 2022-10-05 PROCEDURE — 25000003 PHARM REV CODE 250: Performed by: INTERNAL MEDICINE

## 2022-10-05 PROCEDURE — 86920 COMPATIBILITY TEST SPIN: CPT | Performed by: INTERNAL MEDICINE

## 2022-10-05 PROCEDURE — 36415 COLL VENOUS BLD VENIPUNCTURE: CPT | Performed by: INTERNAL MEDICINE

## 2022-10-05 PROCEDURE — 97161 PT EVAL LOW COMPLEX 20 MIN: CPT

## 2022-10-05 PROCEDURE — 86901 BLOOD TYPING SEROLOGIC RH(D): CPT | Performed by: INTERNAL MEDICINE

## 2022-10-05 PROCEDURE — 99900035 HC TECH TIME PER 15 MIN (STAT)

## 2022-10-05 PROCEDURE — 85025 COMPLETE CBC W/AUTO DIFF WBC: CPT | Performed by: INTERNAL MEDICINE

## 2022-10-05 PROCEDURE — A4216 STERILE WATER/SALINE, 10 ML: HCPCS | Performed by: INTERNAL MEDICINE

## 2022-10-05 RX ORDER — MORPHINE SULFATE 4 MG/ML
2 INJECTION, SOLUTION INTRAMUSCULAR; INTRAVENOUS ONCE
Status: COMPLETED | OUTPATIENT
Start: 2022-10-05 | End: 2022-10-05

## 2022-10-05 RX ADMIN — CARVEDILOL 25 MG: 25 TABLET, FILM COATED ORAL at 08:10

## 2022-10-05 RX ADMIN — OXYCODONE HYDROCHLORIDE AND ACETAMINOPHEN 1 TABLET: 5; 325 TABLET ORAL at 08:10

## 2022-10-05 RX ADMIN — FLUOXETINE 20 MG: 20 CAPSULE ORAL at 08:10

## 2022-10-05 RX ADMIN — HEPARIN SODIUM 4000 UNITS: 1000 INJECTION, SOLUTION INTRAVENOUS; SUBCUTANEOUS at 10:10

## 2022-10-05 RX ADMIN — APIXABAN 5 MG: 2.5 TABLET, FILM COATED ORAL at 08:10

## 2022-10-05 RX ADMIN — Medication 10 ML: at 10:10

## 2022-10-05 RX ADMIN — LEVETIRACETAM 500 MG: 500 TABLET, FILM COATED ORAL at 08:10

## 2022-10-05 RX ADMIN — BISACODYL 10 MG: 5 TABLET, COATED ORAL at 08:10

## 2022-10-05 RX ADMIN — ISOSORBIDE MONONITRATE 120 MG: 60 TABLET, EXTENDED RELEASE ORAL at 08:10

## 2022-10-05 RX ADMIN — INSULIN ASPART 4 UNITS: 100 INJECTION, SOLUTION INTRAVENOUS; SUBCUTANEOUS at 08:10

## 2022-10-05 RX ADMIN — INSULIN ASPART 2 UNITS: 100 INJECTION, SOLUTION INTRAVENOUS; SUBCUTANEOUS at 04:10

## 2022-10-05 RX ADMIN — ACETAMINOPHEN 650 MG: 325 TABLET ORAL at 11:10

## 2022-10-05 RX ADMIN — OXYCODONE HYDROCHLORIDE AND ACETAMINOPHEN 1 TABLET: 5; 325 TABLET ORAL at 05:10

## 2022-10-05 RX ADMIN — FUROSEMIDE 40 MG: 40 TABLET ORAL at 08:10

## 2022-10-05 RX ADMIN — FAMOTIDINE 20 MG: 20 TABLET, FILM COATED ORAL at 08:10

## 2022-10-05 RX ADMIN — Medication 10 ML: at 05:10

## 2022-10-05 RX ADMIN — MORPHINE SULFATE 2 MG: 4 INJECTION INTRAVENOUS at 05:10

## 2022-10-05 RX ADMIN — ARIPIPRAZOLE 5 MG: 5 TABLET ORAL at 08:10

## 2022-10-05 RX ADMIN — HYDRALAZINE HYDROCHLORIDE 100 MG: 25 TABLET, FILM COATED ORAL at 05:10

## 2022-10-05 RX ADMIN — ONDANSETRON 4 MG: 2 INJECTION INTRAMUSCULAR; INTRAVENOUS at 11:10

## 2022-10-05 RX ADMIN — INSULIN DETEMIR 5 UNITS: 100 INJECTION, SOLUTION SUBCUTANEOUS at 09:10

## 2022-10-05 RX ADMIN — ONDANSETRON 4 MG: 2 INJECTION INTRAMUSCULAR; INTRAVENOUS at 04:10

## 2022-10-05 RX ADMIN — Medication 10 ML: at 02:10

## 2022-10-05 RX ADMIN — OXYCODONE HYDROCHLORIDE AND ACETAMINOPHEN 1 TABLET: 5; 325 TABLET ORAL at 11:10

## 2022-10-05 NOTE — CONSULTS
INPATIENT NEPHROLOGY CONSULT   [facility] NEPHROLOGY    Tabby Howard  10/05/2022    Reason for consultation:  esrd    Chief Complaint:   Chief Complaint   Patient presents with    Abdominal Pain     With NV that started yesterday. Did not got to dialysis today went Saturday          History of Present Illness:    Pt was dropped off by her dad because he didn't have time to take her to dialysis.  She has a h/o ESRD, hypertension, diabetes, anemia, secondary hyperparathyroidism, SVT, DVT, Cholecystectomy,  and GERD.  She is tearful, complaining of abdominal pain, sob, fatigue.  C/o nausea and vomiting as well.  No angina.  No new neuro symptoms.   Good history hard to obtain given her emotional state    10/5  net UF 2.5 liters.  Less less sob.  C/o abd pain.  Nausea      Plan of Care:       Assessment:    esrd  --continue dialysis per routine  --fluid restrict  --renal dose medication per routine  --continue outpt medication  --continue binders with meals    Anemia  --transfuse with hd tomorrow.  Goal hg in esrd patient on hemodialysis is 10-12  --erythropoiesis stimulating agent with renal replacement therapy      Hypervolemia  --ultrafilter as tolerated  --supplemental uf performed 10/4  --fluid restrict   --low salt diet    Hypertension  --uf today and tomorrow  --low salt diet  --fluid restrict  --continue outpatient bp medication     H/O hyperphosphatemia  --at goal  --continue Sevelamer   --renal diet    Thank you for allowing us to participate in this patient's care. We will continue to follow.    Vital Signs:  Temp Readings from Last 3 Encounters:   10/05/22 98 °F (36.7 °C)   09/03/22 98.2 °F (36.8 °C) (Oral)   09/02/22 98.8 °F (37.1 °C) (Oral)       Pulse Readings from Last 3 Encounters:   10/05/22 70   09/03/22 84   09/02/22 95       BP Readings from Last 3 Encounters:   10/05/22 120/70   09/03/22 (!) 161/86   09/02/22 (!) 156/94       Weight:  Wt Readings from Last 3 Encounters:   10/04/22 68 kg (150 lb)    22 70 kg (154 lb 5.2 oz)   22 74.8 kg (165 lb)       Past Medical & Surgical History:  Past Medical History:   Diagnosis Date    CKD (chronic kidney disease), stage IV 2022    Diabetes mellitus due to underlying condition with unspecified complications 2022    Gastroparesis 2022    Heart failure with preserved ejection fraction 2022    EF 55% on 3/22    History of gastroesophageal reflux (GERD)     History of supraventricular tachycardia     Hyperkalemia 2022    Hypertensive emergency 2022    Sickle cell trait 2022    Type 2 diabetes mellitus        Past Surgical History:   Procedure Laterality Date     SECTION      x 3    COLONOSCOPY      COLONOSCOPY N/A 2022    Procedure: COLONOSCOPY;  Surgeon: Jagdeep Cedeno MD;  Location: [facility];  Service: Endoscopy;  Laterality: N/A;    ESOPHAGOGASTRODUODENOSCOPY N/A 10/18/2019    Procedure: ESOPHAGOGASTRODUODENOSCOPY (EGD);  Surgeon: Gianluca Mendez MD;  Location: [facility];  Service: Endoscopy;  Laterality: N/A;    ESOPHAGOGASTRODUODENOSCOPY N/A 2022    Procedure: EGD (ESOPHAGOGASTRODUODENOSCOPY);  Surgeon: Micky Paredes III, MD;  Location: [facility];  Service: Endoscopy;  Laterality: N/A;    LAPAROSCOPIC CHOLECYSTECTOMY N/A 2022    Procedure: CHOLECYSTECTOMY, LAPAROSCOPIC;  Surgeon: Grey Perez MD;  Location: [facility];  Service: General;  Laterality: N/A;    PLACEMENT OF DUAL-LUMEN VASCULAR CATHETER Left 2022    Procedure: INSERTION-CATHETER-JOSEPH;  Surgeon: Dionte Gan MD;  Location: [facility] OR;  Service: General;  Laterality: Left;    PLACEMENT OF DUAL-LUMEN VASCULAR CATHETER Right 2022    Procedure: INSERTION-CATHETER-Hemosplit;  Surgeon: Dionte Gan MD;  Location: [facility] OR;  Service: General;  Laterality: Right;       Past Social History:  Social History     Socioeconomic History    Marital status:    Tobacco Use    Smoking status: Never    Smokeless tobacco:  Never   Substance and Sexual Activity    Alcohol use: No    Drug use: No    Sexual activity: Not Currently     Partners: Male     Birth control/protection: I.U.D.     Social Determinants of Health     Financial Resource Strain: Unknown    Difficulty of Paying Living Expenses: Patient refused   Food Insecurity: Unknown    Worried About Running Out of Food in the Last Year: Patient refused    Ran Out of Food in the Last Year: Patient refused   Transportation Needs: Unmet Transportation Needs    Lack of Transportation (Medical): Yes    Lack of Transportation (Non-Medical): Yes   Physical Activity: Unknown    Days of Exercise per Week: Patient refused    Minutes of Exercise per Session: Patient refused   Stress: Unknown    Feeling of Stress : Patient refused   Social Connections: Unknown    Frequency of Communication with Friends and Family: Patient refused    Frequency of Social Gatherings with Friends and Family: Patient refused    Attends Caodaism Services: Patient refused    Active Member of Clubs or Organizations: Patient refused    Attends Club or Organization Meetings: Patient refused    Marital Status: Patient refused   Housing Stability: Unknown    Unable to Pay for Housing in the Last Year: Patient refused    Unstable Housing in the Last Year: Patient refused       Medications:  No current facility-administered medications on file prior to encounter.     Current Outpatient Medications on File Prior to Encounter   Medication Sig Dispense Refill    albuterol (PROVENTIL/VENTOLIN HFA) 90 mcg/actuation inhaler Inhale 2 puffs into the lungs every 6 (six) hours as needed for Wheezing. Rescue      amLODIPine (NORVASC) 10 MG tablet Take 1 tablet (10 mg total) by mouth once daily. 30 tablet 11    apixaban (ELIQUIS) 5 mg Tab Take 1 tablet (5 mg total) by mouth 2 (two) times daily. For second month on. 60 tablet 2    ARIPiprazole (ABILIFY) 5 MG Tab Take 5 mg by mouth once daily.      bisacodyL (DULCOLAX) 5 mg EC tablet  Take 2 tablets (10 mg total) by mouth daily as needed for Constipation. (Patient not taking: Reported on 8/23/2022) 14 tablet 0    carvediloL (COREG) 25 MG tablet Take 1 tablet (25 mg total) by mouth 2 (two) times daily. 60 tablet 2    cloNIDine 0.2 mg/24 hr td ptwk (CATAPRES) 0.2 mg/24 hr Place 1 patch onto the skin every 7 days. (Patient not taking: Reported on 8/23/2022) 4 patch 2    desvenlafaxine succinate (PRISTIQ) 50 MG Tb24 Take 50 mg by mouth every other day.      famotidine (PEPCID) 20 MG tablet Take 1 tablet (20 mg total) by mouth once daily. 30 tablet 0    FLUoxetine 20 MG capsule Take 1 capsule (20 mg total) by mouth once daily. 30 capsule 1    furosemide (LASIX) 40 MG tablet Take 1 tablet (40 mg total) by mouth 2 (two) times daily. (Patient not taking: No sig reported) 60 tablet 0    hydrALAZINE (APRESOLINE) 100 MG tablet Take 1 tablet (100 mg total) by mouth every 8 (eight) hours. 90 tablet 2    insulin aspart U-100 (NOVOLOG) 100 unit/mL (3 mL) InPn pen Inject 1-10 Units into the skin before meals and at bedtime as needed (Hyperglycemia). 3 mL 3    isosorbide mononitrate (IMDUR) 60 MG 24 hr tablet Take 2 tablets (120 mg total) by mouth once daily. 30 tablet 1    LANTUS U-100 INSULIN 100 unit/mL injection Inject 5 Units into the skin once daily.      levETIRAcetam (KEPPRA) 500 MG Tab Take 1 tablet (500 mg total) by mouth 2 (two) times daily. 60 tablet 1    ondansetron (ZOFRAN-ODT) 4 MG TbDL Take 1 tablet (4 mg total) by mouth every 8 (eight) hours as needed (nausea, vomiting). 12 tablet 0    oxyCODONE-acetaminophen (PERCOCET) 5-325 mg per tablet Take 1 tablet by mouth every 6 (six) hours as needed for Pain. (Patient not taking: No sig reported) 20 tablet 0    pantoprazole (PROTONIX) 40 MG tablet Take 1 tablet (40 mg total) by mouth once daily. 30 tablet 0    polyethylene glycol (GLYCOLAX) 17 gram/dose powder Take 17 g by mouth once daily. (Patient not taking: No sig reported) 510 g 1    [DISCONTINUED]  "atenoloL (TENORMIN) 50 MG tablet Take 1 tablet (50 mg total) by mouth every other day. 45 tablet 3    [DISCONTINUED] omeprazole (PRILOSEC) 20 MG capsule Take 2 capsules (40 mg total) by mouth once daily. for 10 days 20 capsule 0    [DISCONTINUED] torsemide (DEMADEX) 20 MG Tab Take 1 tablet (20 mg total) by mouth 2 (two) times a day. (Patient taking differently: Take 20 mg by mouth once daily.) 60 tablet 1     Scheduled Meds:   sodium chloride 0.9%   Intravenous Once    sodium chloride 0.9%   Intravenous Once    amLODIPine  10 mg Oral Daily    apixaban  5 mg Oral BID    ARIPiprazole  5 mg Oral Daily    carvediloL  25 mg Oral BID    cloNIDine 0.2 mg/24 hr td ptwk  1 patch Transdermal Q7 Days    [START ON 10/6/2022] epoetin teresa (PROCRIT) injection  100 Units/kg Intravenous Every Tues, Thurs, Sat    famotidine  20 mg Oral Daily    FLUoxetine  20 mg Oral Daily    furosemide  40 mg Oral BID    hydrALAZINE  100 mg Oral Q8H    insulin detemir U-100  5 Units Subcutaneous QHS    isosorbide mononitrate  120 mg Oral Daily    levETIRAcetam  500 mg Oral BID    mupirocin   Nasal BID    sodium chloride 0.9%  10 mL Intravenous Q8H     Continuous Infusions:  PRN Meds:.    Allergies:  Penicillins    Past Family History:  Reviewed; refer to Hospitalist Admission Note    Review of Systems:  Review of Systems - All 14 systems reviewed and negative, except as noted in HPI    Physical Exam:    /70   Pulse 70   Temp 98 °F (36.7 °C)   Resp 18   Ht 5' 2.01" (1.575 m)   Wt 68 kg (150 lb)   SpO2 96%   Breastfeeding No   BMI 27.43 kg/m²     General Appearance:    Tearful    Head:    Normocephalic, without obvious abnormality, atraumatic   Eyes:    PER, conjunctiva/corneas clear, EOM's intact in both eyes        Throat:   Lips, mucosa, and tongue normal; teeth and gums normal   Back:     Symmetric, no curvature, ROM normal, no CVA tenderness   Lungs:     diminished   Chest wall:    No tenderness or deformity   Heart:    Regular " rate and rhythm, S1 and S2 normal, no murmur, rub   or gallop   Abdomen:     Tender to palpation.  Can be distracted   Extremities:   edema   Pulses:   2+ and symmetric all extremities   MSK:   No joint or muscle swelling, tenderness or deformity   Skin:   Skin color, texture, turgor normal, no rashes or lesions   Neurologic:    .  No flap     Results:  Lab Results   Component Value Date     10/05/2022    K 4.1 10/05/2022     10/05/2022    CO2 23 10/05/2022    BUN 23 (H) 10/05/2022    CREATININE 3.1 (H) 10/05/2022    CALCIUM 8.8 10/05/2022    ANIONGAP 12 10/05/2022    ESTGFRAFRICA 20 (A) 07/31/2022    EGFRNONAA 18 (A) 07/31/2022       Lab Results   Component Value Date    CALCIUM 8.8 10/05/2022    PHOS 4.3 10/05/2022       Recent Labs   Lab 10/05/22  0439   WBC 7.08   RBC 2.49*   HGB 7.3*   HCT 21.4*      MCV 86   MCH 29.3   MCHC 34.1          Imaging Results              CT Abdomen Pelvis  Without Contrast (Final result)  Result time 10/04/22 11:00:32      Final result by Gianluca Arriaga Jr., MD (10/04/22 11:00:32)                   Impression:      Diffuse anasarca.  Small right pleural effusion.  Moderate pericardial effusion.  Small amount of ascites adjacent to the liver, spleen and in the pelvis.    Prior cholecystectomy.  Mild diverticulosis coli.      Electronically signed by: Gianluca Arriaga MD  Date:    10/04/2022  Time:    11:00               Narrative:    EXAMINATION:  CT ABDOMEN PELVIS WITHOUT CONTRAST    CLINICAL HISTORY:  Nausea/vomiting;    TECHNIQUE:  Low dose axial images, sagittal and coronal reformations were obtained from the lung bases to the pubic symphysis.  30 mL of oral Omnipaque 350 was administered.    COMPARISON:  Chest x-ray of October 4, 2022    FINDINGS:  There is diffuse anasarca throughout the chest abdomen and pelvis.  There is a small right pleural effusion moderate pericardial effusion noted.  Small amount of ascites is seen adjacent to the liver, spleen  and in the pelvis.    The liver is of normal size and CT density.  The gallbladder is absent with surgical clips noted.  The visualized pancreas appears within normal limits.  The spleen is of normal size and CT density.    The adrenal glands are not enlarged.  The kidneys are of normal size and CT density for a noncontrast study.  Stone or hydronephrosis is not seen.  The abdominal aorta and inferior vena cava are of normal caliber.    The stomach is of normal configuration.  Small bowel dilatation or air-fluid levels are not seen.  The colon is of normal configuration with few diverticuli of the sigmoid colon without CT evidence of diverticulitis.    Bladder is of normal contour without mass or asymmetry.  The uterus is within normal limits.                                       X-Ray Chest AP Portable (Final result)  Result time 10/04/22 09:42:05      Final result by Kaushik Gutierrez MD (10/04/22 09:42:05)                   Impression:      Mild CHF, slightly improved from prior.      Electronically signed by: Kaushik Gutierrez  Date:    10/04/2022  Time:    09:42               Narrative:    EXAMINATION:  XR CHEST AP PORTABLE    CLINICAL HISTORY:  hyperglycemia;    TECHNIQUE:  Single frontal view of the chest was performed.    COMPARISON:  09/02/2022    FINDINGS:  Bibasilar interstitial disease.  Enlarged cardiac silhouette.  Right-sided central line with tip projecting over the right heart border presumably in the right atrium.  No pleural effusion or pneumothorax.                                        I have personally reviewed pertinent radiological imaging and reports.    Patient care was time spent personally by me on the following activities:   Obtaining a history  Examination of patient.  Providing medical care at the patients bedside.  Developing a treatment plan with patient or surrogate and bedside caregivers  Ordering and reviewing laboratory studies, radiographic studies, pulse oximetry.  Ordering  and performing treatments and interventions.  Evaluation of patient's response to treatment.  Discussions with consultants while on the unit and immediately available to the patient.  Re-evaluation of the patient's condition.  Documentation in the medical record.     Hugh Figueroa MD  Nephrology  [facility] [place]  [phone]

## 2022-10-05 NOTE — PROGRESS NOTES
10/04/22 6956   Handoff Report   Received From aga   Given To lina   Treatment Type   Treatment Type Maintenance   Vital Signs   Temp 97 °F (36.1 °C)   Pulse 71   Resp 16   /79   Post-Hemodialysis Assessment   Rinseback Volume (mL) 250 mL   Blood Volume Processed (Liters) 60 L   Dialyzer Clearance Lightly streaked   Duration of Treatment 180 minutes   Additional Fluid Intake (mL) 500 mL   Total UF (mL) 4500 mL   Net Fluid Removal 4000   Patient Response to Treatment vs   Post-Treatment Weight 64 kg (141 lb 1.5 oz)   Treatment Weight Change -4   Post-Hemodialysis Comments end of tx

## 2022-10-05 NOTE — PLAN OF CARE
"[facility]  Initial Discharge Assessment       Primary Care Provider: [facility]    Admission Diagnosis: Anasarca [R60.1]  Generalized abdominal pain [R10.84]  Hyperglycemia [R73.9]  ESRD (end stage renal disease) on dialysis [N18.6, Z99.2]  Diffuse abdominal pain [R10.84]  Chest pain [R07.9]  Hypervolemia, unspecified hypervolemia type [E87.70]    Admission Date: 10/4/2022  Expected Discharge Date: 10/6/2022    CM attepted DC assessment with pt, pt not very responsive to questions and asked CM to call her dad, garcia [phone]. Garcia states pt used to live with him but she moved "back home" to address on file. Garcia provides transportation to pt's TTS dialysis at [facility] in [place]. Pt is diabetic with a glucometer. Denies POA/ LW. Denies recent admission. PCP [facility]. Pt independent with ADLs. No new needs anticipated at DC. Garcia to provide transportation home. CM following         Payor: MEDICAID / Plan: [place] / Product Type: Managed Medicaid /     Extended Emergency Contact Information  Primary Emergency Contact: Garcia Howard  Mobile Phone: [phone]  Relation: Father  Preferred language: English   needed? No  Secondary Emergency Contact: Tanya Howard  Mobile Phone: [phone]  Relation: Step parent  Preferred language: English   needed? No    Discharge Plan A: Home with family  Discharge Plan B: Home      [facility] [address]  [facility] [address]  Phone: [phone] Fax: [phone]      Initial Assessment (most recent)       Adult Discharge Assessment - 10/05/22 1031          Discharge Assessment    Assessment Type Discharge Planning Assessment     Confirmed/corrected address, phone number and insurance Yes     Confirmed Demographics Correct on Facesheet     Source of Information patient;family     If unable to respond/provide information was " family/caregiver contacted? Yes     Lives With alone     Do you expect to return to your current living situation? Yes     Do you have help at home or someone to help you manage your care at home? No     Prior to hospitilization cognitive status: Alert/Oriented     Current cognitive status: Alert/Oriented     Walking or Climbing Stairs Difficulty none     Dressing/Bathing Difficulty none     Equipment Currently Used at Home glucometer     Readmission within 30 days? No     Patient currently being followed by outpatient case management? No     Do you currently have service(s) that help you manage your care at home? No     Do you take prescription medications? Yes     Do you have prescription coverage? Yes     Is the patient taking medications as prescribed? yes     How do you get to doctors appointments? family or friend will provide     Are you on dialysis? Yes     Do you take coumadin? No     Discharge Plan A Home with family     Discharge Plan B Home     DME Needed Upon Discharge  none     Discharge Plan discussed with: Parent(s);Patient

## 2022-10-05 NOTE — ASSESSMENT & PLAN NOTE
Noted.  Continue supportive care with sparing use of narcotics.  Continue use of antiemetics and proton pump inhibitor.  Advance diet as tolerated.

## 2022-10-05 NOTE — NURSING
Patient is currently making herself vomit by sticking her fingers down her throat... she was caught by nursing staff and vomited all in the bed.

## 2022-10-05 NOTE — CONSULTS
Attempted to see patient. Pt very tearful and curled into a ball at visit, unable to get meaningful responses at this time. Will re-visit tomorrow.

## 2022-10-05 NOTE — ASSESSMENT & PLAN NOTE
Patient is getting back-to-back hemodialysis.  Routine hemodialysis therapy as per Nephrology.  Continue Lasix therapy.

## 2022-10-05 NOTE — ASSESSMENT & PLAN NOTE
Nutrition consulted. Most recent weight and BMI monitored-   Nutrition consulted. Encourage maximal PO intake. Diet supplementation ordered per nutrition approval. Will encourage PO and monitor closely for weight changes.                          Measurements:  Wt Readings from Last 1 Encounters:   10/04/22 68 kg (150 lb)   Body mass index is 27.43 kg/m².    Recommendations:      Patient has been screened and assessed by RD. RD will follow patient.

## 2022-10-05 NOTE — CARE UPDATE
10/04/22 2020   Patient Assessment/Suction   Level of Consciousness (AVPU) alert   Respiratory Effort Normal;Unlabored   Expansion/Accessory Muscles/Retractions expansion symmetric;no retractions;no use of accessory muscles   PRE-TX-O2   O2 Device (Oxygen Therapy) room air   SpO2 97 %   Pulse Oximetry Type Intermittent   Pulse 70   Resp 18   Aerosol Therapy   $ Aerosol Therapy Charges PRN treatment not required   Respiratory Treatment Status (SVN) PRN treatment not required

## 2022-10-05 NOTE — PLAN OF CARE
Problem: Physical Therapy  Goal: Physical Therapy Goal  Description: Goals to be met by: 10-     Patient will increase functional independence with mobility by performin. Supine to sit with Modified [place]  2. Sit to stand transfer with Contact Guard Assistance  3. Bed to chair transfer with Contact Guard Assistance using Rolling Walker  4. Gait  x 250 feet with Minimal Assistance using Rolling Walker.   5. Lower extremity exercise program x20 reps   Outcome: Ongoing, Progressing   PT eval and treat completed. Gait 80ft with RW min assist. Pt making crying sounds

## 2022-10-05 NOTE — PT/OT/SLP PROGRESS
Occupational Therapy      Patient Name:  Tabby Howard   MRN:  [id number]    Patient not seen today secondary to Dialysis. Will follow-up when appropriate.    10/5/2022

## 2022-10-05 NOTE — PROGRESS NOTES
[facility] Hospital Medicine  Progress Note    Patient Name: Tabby Howard  MRN: [id number]  Patient Class: IP- Inpatient   Admission Date: 10/4/2022  Length of Stay: 1 days  Attending Physician: Tosin Roberts MD  Primary Care Provider: [facility]        Subjective:     Principal Problem:Generalized abdominal pain        HPI:  Patient is a 32-year-old  female with past medical history significant for end-stage renal disease (on hemodialysis Tuesday/Thursday/Saturday), hypertension, uncontrolled diabetes mellitus type 2, seizure disorder, gastroparesis, history of deep venous thrombosis and secondary parathyroidism who is being admitted to Hospital Medicine under inpatient status from [facility] Emergency room with complaints of diffuse abdominal pain associated with nausea and vomiting for 1-2 days.  Patient reports compliance with hemodialysis therapy and was expected to undergo hemodialysis earlier this morning.  It reportedly patient's father dropped her off to the emergency room.  Patient is complaining of diffuse abdominal pain for which patient received intravenous morphine.  Patient also reports associated nausea and 1 episode of emesis.  Patient denies any hematemesis, hemoptysis, melena or bleeding per rectum.  No recent fevers or chills reported.  Denies any urinary complaints.  Upon arrival, patient noted to have elevated blood pressure of 195/111 mmHg.  Dr. Figueroa from Nephrology is arranging emergent hemodialysis treatment.            Overview/Hospital Course:  No notes on file    Interval History:  Patient is getting 2nd round of hemodialysis today.  Appears lethargic, significant facial swelling present.  Shortness of breath is improving.  Continues to complain diffuse nonspecific abdominal pain, asking for more pain medications.  No chest pain cough or fever reported.    Review of Systems   Constitutional:   Positive for appetite change and fatigue.   Gastrointestinal:  Positive for abdominal pain, nausea and vomiting.   Musculoskeletal:  Positive for back pain.   Neurological:  Positive for weakness.   All other systems reviewed and are negative.  Objective:     Vital Signs (Most Recent):  Temp: 97.8 °F (36.6 °C) (10/05/22 0715)  Pulse: 77 (10/05/22 0756)  Resp: 14 (10/05/22 0756)  BP: 119/70 (10/05/22 0715)  SpO2: 96 % (10/05/22 0756) Vital Signs (24h Range):  Temp:  [96.9 °F (36.1 °C)-97.8 °F (36.6 °C)] 97.8 °F (36.6 °C)  Pulse:  [66-78] 77  Resp:  [14-21] 14  SpO2:  [93 %-100 %] 96 %  BP: (104-196)/() 119/70     Weight: 68 kg (150 lb)  Body mass index is 27.43 kg/m².    Intake/Output Summary (Last 24 hours) at 10/5/2022 0943  Last data filed at 10/5/2022 0938  Gross per 24 hour   Intake 1730 ml   Output 4500 ml   Net -2770 ml      Physical Exam  Constitutional:       Appearance: She is well-developed.   HENT:      Head: Normocephalic and atraumatic.   Eyes:      Conjunctiva/sclera: Conjunctivae normal.      Pupils: Pupils are equal, round, and reactive to light.   Neck:      Thyroid: No thyromegaly.      Vascular: No JVD.   Cardiovascular:      Rate and Rhythm: Normal rate and regular rhythm.      Heart sounds: No murmur heard.    No friction rub. No gallop.   Pulmonary:      Effort: Pulmonary effort is normal.      Breath sounds: Normal breath sounds.   Abdominal:      General: Bowel sounds are normal. There is no distension.      Palpations: Abdomen is soft. There is no mass.      Tenderness: There is no abdominal tenderness.   Musculoskeletal:         General: Normal range of motion.      Cervical back: Neck supple.      Comments: 1+ pitting edema bilateral lower extremities, Homans sign negative bilaterally.   Skin:     General: Skin is warm and dry.   Neurological:      Mental Status: She is oriented to person, place, and time.      Cranial Nerves: No cranial nerve deficit.   Psychiatric:          Behavior: Behavior normal.       Significant Labs: All pertinent labs within the past 24 hours have been reviewed.  CBC:   Recent Labs   Lab 10/04/22  0901 10/05/22  0439   WBC 7.41 7.08   HGB 7.8* 7.3*   HCT 23.1* 21.4*   * 150     CMP:   Recent Labs   Lab 10/04/22  0901 10/05/22  0439    139   K 4.9 4.1    104   CO2 23 23   * 171*   BUN 35* 23*   CREATININE 4.4* 3.1*   CALCIUM 9.1 8.8   PROT 6.6 5.7*   ALBUMIN 2.7* 2.3*   BILITOT 1.8* 1.3*   ALKPHOS 227* 179*   AST 22 21   ALT 18 16   ANIONGAP 16 12     Coagulation: No results for input(s): PT, INR, APTT in the last 48 hours.  Lactic Acid: No results for input(s): LACTATE in the last 48 hours.  Lipase:   Recent Labs   Lab 10/04/22  0901   LIPASE 17     Microbiology Results (last 7 days)       ** No results found for the last 168 hours. **          Significant Imaging:   CT abdomen and pelvis without contrast:  Diffuse anasarca.  Small right pleural effusion.  Moderate pericardial effusion.  Small amount of ascites adjacent to the liver, spleen and in the pelvis.  Prior cholecystectomy.  Mild diverticulosis coli.  Contrast:      CXR: Mild CHF, slightly improved from prior.      Assessment/Plan:      * Generalized abdominal pain  Unspecified.  CT abdomen and pelvis without contrast results reviewed, no acute intra-abdominal process noted.  Symptoms are likely related to underlying gastroparesis versus possibility of diabetic ketoacidosis.  Continue supportive care with intravenous narcotics-sparing use.  Use antiemetics as needed.      Malnutrition of moderate degree  Nutrition consulted. Most recent weight and BMI monitored-   Nutrition consulted. Encourage maximal PO intake. Diet supplementation ordered per nutrition approval. Will encourage PO and monitor closely for weight changes.                          Measurements:  Wt Readings from Last 1 Encounters:   10/04/22 68 kg (150 lb)   Body mass index is 27.43 kg/m².    Recommendations:       Patient has been screened and assessed by RD. RD will follow patient.      Volume overload  Patient is getting back-to-back hemodialysis.  Routine hemodialysis therapy as per Nephrology.  Continue Lasix therapy.      HTN (hypertension)  Continue use of Norvasc, Tenormin, hydralazine, Lasix, Coreg and Imdur as before.  Use intravenous hydralazine 10 mg IV q.4 hours p.r.n. for systolic blood pressure above 160 mmHg.      Deep vein thrombosis (DVT) of non-extremity vein  Continue Eliquis as before.      ESRD (end stage renal disease)  Emergent hemodialysis is being arranged by Dr. Figueroa.  Follow Nephrology recommendation      Seizure  Continue Keppra use as before.  Also her fall and seizure precautions.      Diabetes mellitus due to underlying condition with unspecified complications  Patient's FSGs are uncontrolled due to hyperglycemia on current medication regimen.  Last A1c reviewed-   Lab Results   Component Value Date    HGBA1C 6.2 08/24/2022     Most recent fingerstick glucose reviewed-   Recent Labs   Lab 10/04/22  0845   POCTGLUCOSE 430*     Current correctional scale  Medium  Increase anti-hyperglycemic dose as follows-   Antihyperglycemics (From admission, onward)    None        Hold Oral hypoglycemics while patient is in the hospital.  Check BMP in the evening and repeat serum ketones in the morning.  If no improvement in glycemic control, will consider insulin infusion therapy.    Gastroparesis  Noted.  Continue supportive care with sparing use of narcotics.  Continue use of antiemetics and proton pump inhibitor.  Advance diet as tolerated.    Encouraged patient to get out of bed and work with physical therapy.  VTE Risk Mitigation (From admission, onward)         Ordered     apixaban tablet 5 mg  2 times daily         10/04/22 1343     heparin (porcine) injection 5,000 Units  As needed (PRN)         10/04/22 1750     heparin (porcine) injection 4,000 Units  As needed (PRN)         10/04/22 1552     IP  VTE HIGH RISK PATIENT  Once         10/04/22 1343     Place sequential compression device  Until discontinued         10/04/22 1343     Reason for No Pharmacological VTE Prophylaxis  Once        Question:  Reasons:  Answer:  Already adequately anticoagulated on oral Anticoagulants    10/04/22 1343                Discharge Planning   TATIANA: 10/6/2022     Code Status: Full Code   Is the patient medically ready for discharge?:     Reason for patient still in hospital (select all that apply): Patient trending condition and Consult recommendations                     Tosin Roberts MD  Department of Hospital Medicine   [facility]

## 2022-10-05 NOTE — PROGRESS NOTES
10/05/22 1230   Handoff Report   Received From aga   Given To jt   Treatment Type   Treatment Type Maintenance   Vital Signs   Temp 98 °F (36.7 °C)   Pulse 70   Resp 18   /70   Post-Hemodialysis Assessment   Rinseback Volume (mL) 250 mL   Blood Volume Processed (Liters) 0 L   Dialyzer Clearance Lightly streaked   Duration of Treatment 180 minutes   Additional Fluid Intake (mL) 500 mL   Total UF (mL) 3000 mL   Net Fluid Removal 2500   Patient Response to Treatment vss   Post-Treatment Weight 65.5 kg (144 lb 6.4 oz)   Treatment Weight Change -2.5   Post-Hemodialysis Comments end of tx

## 2022-10-05 NOTE — SUBJECTIVE & OBJECTIVE
Interval History:  Patient is getting 2nd round of hemodialysis today.  Appears lethargic, significant facial swelling present.  Shortness of breath is improving.  Continues to complain diffuse nonspecific abdominal pain, asking for more pain medications.  No chest pain cough or fever reported.    Review of Systems   Constitutional:  Positive for appetite change and fatigue.   Gastrointestinal:  Positive for abdominal pain, nausea and vomiting.   Musculoskeletal:  Positive for back pain.   Neurological:  Positive for weakness.   All other systems reviewed and are negative.  Objective:     Vital Signs (Most Recent):  Temp: 97.8 °F (36.6 °C) (10/05/22 0715)  Pulse: 77 (10/05/22 0756)  Resp: 14 (10/05/22 0756)  BP: 119/70 (10/05/22 0715)  SpO2: 96 % (10/05/22 0756) Vital Signs (24h Range):  Temp:  [96.9 °F (36.1 °C)-97.8 °F (36.6 °C)] 97.8 °F (36.6 °C)  Pulse:  [66-78] 77  Resp:  [14-21] 14  SpO2:  [93 %-100 %] 96 %  BP: (104-196)/() 119/70     Weight: 68 kg (150 lb)  Body mass index is 27.43 kg/m².    Intake/Output Summary (Last 24 hours) at 10/5/2022 0943  Last data filed at 10/5/2022 0938  Gross per 24 hour   Intake 1730 ml   Output 4500 ml   Net -2770 ml      Physical Exam  Constitutional:       Appearance: She is well-developed.   HENT:      Head: Normocephalic and atraumatic.   Eyes:      Conjunctiva/sclera: Conjunctivae normal.      Pupils: Pupils are equal, round, and reactive to light.   Neck:      Thyroid: No thyromegaly.      Vascular: No JVD.   Cardiovascular:      Rate and Rhythm: Normal rate and regular rhythm.      Heart sounds: No murmur heard.    No friction rub. No gallop.   Pulmonary:      Effort: Pulmonary effort is normal.      Breath sounds: Normal breath sounds.   Abdominal:      General: Bowel sounds are normal. There is no distension.      Palpations: Abdomen is soft. There is no mass.      Tenderness: There is no abdominal tenderness.   Musculoskeletal:         General: Normal range of  motion.      Cervical back: Neck supple.      Comments: 1+ pitting edema bilateral lower extremities, Homans sign negative bilaterally.   Skin:     General: Skin is warm and dry.   Neurological:      Mental Status: She is oriented to person, place, and time.      Cranial Nerves: No cranial nerve deficit.   Psychiatric:         Behavior: Behavior normal.       Significant Labs: All pertinent labs within the past 24 hours have been reviewed.  CBC:   Recent Labs   Lab 10/04/22  0901 10/05/22  0439   WBC 7.41 7.08   HGB 7.8* 7.3*   HCT 23.1* 21.4*   * 150     CMP:   Recent Labs   Lab 10/04/22  0901 10/05/22  0439    139   K 4.9 4.1    104   CO2 23 23   * 171*   BUN 35* 23*   CREATININE 4.4* 3.1*   CALCIUM 9.1 8.8   PROT 6.6 5.7*   ALBUMIN 2.7* 2.3*   BILITOT 1.8* 1.3*   ALKPHOS 227* 179*   AST 22 21   ALT 18 16   ANIONGAP 16 12     Coagulation: No results for input(s): PT, INR, APTT in the last 48 hours.  Lactic Acid: No results for input(s): LACTATE in the last 48 hours.  Lipase:   Recent Labs   Lab 10/04/22  0901   LIPASE 17     Microbiology Results (last 7 days)       ** No results found for the last 168 hours. **          Significant Imaging:   CT abdomen and pelvis without contrast:  Diffuse anasarca.  Small right pleural effusion.  Moderate pericardial effusion.  Small amount of ascites adjacent to the liver, spleen and in the pelvis.  Prior cholecystectomy.  Mild diverticulosis coli.  Contrast:      CXR: Mild CHF, slightly improved from prior.

## 2022-10-05 NOTE — PT/OT/SLP EVAL
Physical Therapy Evaluation    Patient Name:  Tabby Howard   MRN:  [id number]    Recommendations:     Discharge Recommendations:  home health PT   Discharge Equipment Recommendations: walker, rolling   Barriers to discharge: Decreased caregiver support    Assessment:     Tabby Howard is a 32 y.o. female admitted with a medical diagnosis of Generalized abdominal pain.  She presents with the following impairments/functional limitations:  weakness, impaired endurance, impaired functional mobility, gait instability, impaired cognition, decreased safety awareness, pain .    Pt seen supine in bed, restless, making crying sounds. Pt agreeable to PT- ambulated with RW 80ft with another person following with chair pt making crying sounds the entire time. Facial swelling noted, not interactive.  Back to bed post PT as she is restless in chair.    Rehab Prognosis: Fair; patient would benefit from acute skilled PT services to address these deficits and reach maximum level of function.    Recent Surgery: * No surgery found *      Plan:     During this hospitalization, patient to be seen 6 x/week to address the identified rehab impairments via gait training, therapeutic activities, therapeutic exercises and progress toward the following goals:    Plan of Care Expires:  10/30/22    Subjective     Chief Complaint: crying sounds  Nodded when asked if her belly hurts  Patient/Family Comments/goals: none stated  Pain/Comfort:  Pain Rating 1:  (not rated)  Location 1: abdomen  Pain Addressed 1: Distraction, Cessation of Activity, Nurse notified    Patients cultural, spiritual, Restorationism conflicts given the current situation:      Living Environment:  Home with step father  Prior to admission, patients level of function was ambulatory.  Equipment used at home:  .  DME owned (not currently used): none.  Upon discharge, patient will have assistance from unknown.    Objective:     Communicated with nurse Samuel prior to session.  Patient  found HOB elevated with telemetry  upon PT entry to room.    General Precautions: Standard, fall   Orthopedic Precautions:N/A   Braces: N/A  Respiratory Status: Room air    Exams:  Postural Exam:  Patient presented with the following abnormalities:    -       Rounded shoulders  -       Forward head  -       BMI 27  RLE ROM: WFL  RLE Strength: Deficits: 3+/5  LLE ROM: WFL  LLE Strength: 3+/5    Functional Mobility:  Bed Mobility:     Rolling Right: modified independence  Scooting: minimum assistance  Supine to Sit: minimum assistance  Sit to Supine: minimum assistance  Transfers:     Sit to Stand:  minimum assistance with rolling walker  Bed to Chair: minimum assistance with  rolling walker  using  Stand Pivot  Gait: 80ft with RW min assist and another person following with chair    Therapeutic Activities and Exercises:   Patient was educated on the importance of OOB activity and functional mobility to negate negative effects of prolonged bed rest during hospitalization, safe transfers and ambulation, and D/C planning   OOB chair briefly and returned to bed due to restlessness    AM-PAC 6 CLICK MOBILITY  Total Score:17     Patient left HOB elevated with all lines intact and call button in reach.    GOALS:   Multidisciplinary Problems       Physical Therapy Goals          Problem: Physical Therapy    Goal Priority Disciplines Outcome Goal Variances Interventions   Physical Therapy Goal     PT, PT/OT Ongoing, Progressing     Description: Goals to be met by: 10-     Patient will increase functional independence with mobility by performin. Supine to sit with Modified [place]  2. Sit to stand transfer with Contact Guard Assistance  3. Bed to chair transfer with Contact Guard Assistance using Rolling Walker  4. Gait  x 250 feet with Minimal Assistance using Rolling Walker.   5. Lower extremity exercise program x20 reps                        History:     Past Medical History:   Diagnosis Date    CKD  (chronic kidney disease), stage IV 2022    Diabetes mellitus due to underlying condition with unspecified complications 2022    Gastroparesis 2022    Heart failure with preserved ejection fraction 2022    EF 55% on 3/22    History of gastroesophageal reflux (GERD)     History of supraventricular tachycardia     Hyperkalemia 2022    Hypertensive emergency 2022    Sickle cell trait 2022    Type 2 diabetes mellitus        Past Surgical History:   Procedure Laterality Date     SECTION      x 3    COLONOSCOPY      COLONOSCOPY N/A 2022    Procedure: COLONOSCOPY;  Surgeon: Jagdeep Cedeno MD;  Location: [facility];  Service: Endoscopy;  Laterality: N/A;    ESOPHAGOGASTRODUODENOSCOPY N/A 10/18/2019    Procedure: ESOPHAGOGASTRODUODENOSCOPY (EGD);  Surgeon: Gianluca Mendez MD;  Location: [facility];  Service: Endoscopy;  Laterality: N/A;    ESOPHAGOGASTRODUODENOSCOPY N/A 2022    Procedure: EGD (ESOPHAGOGASTRODUODENOSCOPY);  Surgeon: Micky Paredes III, MD;  Location: [facility];  Service: Endoscopy;  Laterality: N/A;    LAPAROSCOPIC CHOLECYSTECTOMY N/A 2022    Procedure: CHOLECYSTECTOMY, LAPAROSCOPIC;  Surgeon: Grey Perez MD;  Location: [facility];  Service: General;  Laterality: N/A;    PLACEMENT OF DUAL-LUMEN VASCULAR CATHETER Left 2022    Procedure: INSERTION-CATHETER-JOSEPH;  Surgeon: Dionte Gna MD;  Location: [facility] OR;  Service: General;  Laterality: Left;    PLACEMENT OF DUAL-LUMEN VASCULAR CATHETER Right 2022    Procedure: INSERTION-CATHETER-Hemosplit;  Surgeon: Dionte Gan MD;  Location: [facility] OR;  Service: General;  Laterality: Right;       Time Tracking:     PT Received On: 10/05/22  PT Start Time: 1428     PT Stop Time: 1441  PT Total Time (min): 13 min     Billable Minutes: Evaluation 13      10/05/2022

## 2022-10-05 NOTE — NURSING
Dr. Roberts made aware of 10/10 unrelieved abdominal pain and patient making her self throw up all over the floors. MD ordered morphine 2mg IV once and A consult to GI for abd pain and gastroparesis.

## 2022-10-05 NOTE — CHAPLAIN
Attempted to visit pt during general rounding; know her well from previous visits. Pt was sleeping and grunted to acknowledge me when I knocked, entered and re-introduced myself; pt did not open her eyes; laying on her stomach curled into a ball; tried to engage; then provided silence and compassionate presence with a tender touch; then pt started snoring.      will continue to follow to glean what's on her mind, what resources she needs and what's most important to her-- all that will help in her healing. Lord, in your mercy.

## 2022-10-06 PROBLEM — D64.9 SYMPTOMATIC ANEMIA: Status: ACTIVE | Noted: 2022-10-06

## 2022-10-06 LAB
ABO + RH BLD: NORMAL
ALBUMIN SERPL BCP-MCNC: 2.2 G/DL (ref 3.5–5.2)
ALP SERPL-CCNC: 169 U/L (ref 55–135)
ALT SERPL W/O P-5'-P-CCNC: 15 U/L (ref 10–44)
ANION GAP SERPL CALC-SCNC: 11 MMOL/L (ref 8–16)
AST SERPL-CCNC: 15 U/L (ref 10–40)
BASOPHILS # BLD AUTO: 0.03 K/UL (ref 0–0.2)
BASOPHILS NFR BLD: 0.6 % (ref 0–1.9)
BILIRUB SERPL-MCNC: 0.9 MG/DL (ref 0.1–1)
BLD GP AB SCN CELLS X3 SERPL QL: NORMAL
BLD PROD TYP BPU: NORMAL
BLOOD UNIT EXPIRATION DATE: NORMAL
BLOOD UNIT TYPE CODE: 6200
BLOOD UNIT TYPE: NORMAL
BUN SERPL-MCNC: 17 MG/DL (ref 6–20)
CALCIUM SERPL-MCNC: 8.3 MG/DL (ref 8.7–10.5)
CHLORIDE SERPL-SCNC: 104 MMOL/L (ref 95–110)
CO2 SERPL-SCNC: 26 MMOL/L (ref 23–29)
CODING SYSTEM: NORMAL
CREAT SERPL-MCNC: 2.9 MG/DL (ref 0.5–1.4)
DIFFERENTIAL METHOD: ABNORMAL
DISPENSE STATUS: NORMAL
EOSINOPHIL # BLD AUTO: 0 K/UL (ref 0–0.5)
EOSINOPHIL NFR BLD: 0.6 % (ref 0–8)
ERYTHROCYTE [DISTWIDTH] IN BLOOD BY AUTOMATED COUNT: 15 % (ref 11.5–14.5)
EST. GFR  (NO RACE VARIABLE): 21 ML/MIN/1.73 M^2
GLUCOSE SERPL-MCNC: 193 MG/DL (ref 70–110)
HCT VFR BLD AUTO: 19.6 % (ref 37–48.5)
HGB BLD-MCNC: 6.4 G/DL (ref 12–16)
IMM GRANULOCYTES # BLD AUTO: 0.02 K/UL (ref 0–0.04)
IMM GRANULOCYTES NFR BLD AUTO: 0.4 % (ref 0–0.5)
LACTATE SERPL-SCNC: 0.7 MMOL/L (ref 0.5–2.2)
LYMPHOCYTES # BLD AUTO: 0.8 K/UL (ref 1–4.8)
LYMPHOCYTES NFR BLD: 14.3 % (ref 18–48)
MAGNESIUM SERPL-MCNC: 1.9 MG/DL (ref 1.6–2.6)
MCH RBC QN AUTO: 28.7 PG (ref 27–31)
MCHC RBC AUTO-ENTMCNC: 32.7 G/DL (ref 32–36)
MCV RBC AUTO: 88 FL (ref 82–98)
MONOCYTES # BLD AUTO: 0.4 K/UL (ref 0.3–1)
MONOCYTES NFR BLD: 7.5 % (ref 4–15)
NEUTROPHILS # BLD AUTO: 4.2 K/UL (ref 1.8–7.7)
NEUTROPHILS NFR BLD: 76.6 % (ref 38–73)
NRBC BLD-RTO: 0 /100 WBC
NUM UNITS TRANS PACKED RBC: NORMAL
PHOSPHATE SERPL-MCNC: 4.5 MG/DL (ref 2.7–4.5)
PLATELET # BLD AUTO: 152 K/UL (ref 150–450)
PMV BLD AUTO: 10.3 FL (ref 9.2–12.9)
POCT GLUCOSE: 141 MG/DL (ref 70–110)
POCT GLUCOSE: 151 MG/DL (ref 70–110)
POCT GLUCOSE: 160 MG/DL (ref 70–110)
POCT GLUCOSE: 241 MG/DL (ref 70–110)
POTASSIUM SERPL-SCNC: 3.6 MMOL/L (ref 3.5–5.1)
PROT SERPL-MCNC: 5.2 G/DL (ref 6–8.4)
RBC # BLD AUTO: 2.23 M/UL (ref 4–5.4)
SODIUM SERPL-SCNC: 141 MMOL/L (ref 136–145)
WBC # BLD AUTO: 5.44 K/UL (ref 3.9–12.7)

## 2022-10-06 PROCEDURE — 97165 OT EVAL LOW COMPLEX 30 MIN: CPT

## 2022-10-06 PROCEDURE — 63600175 PHARM REV CODE 636 W HCPCS: Performed by: INTERNAL MEDICINE

## 2022-10-06 PROCEDURE — 36430 TRANSFUSION BLD/BLD COMPNT: CPT

## 2022-10-06 PROCEDURE — 36415 COLL VENOUS BLD VENIPUNCTURE: CPT | Performed by: INTERNAL MEDICINE

## 2022-10-06 PROCEDURE — P9016 RBC LEUKOCYTES REDUCED: HCPCS | Performed by: INTERNAL MEDICINE

## 2022-10-06 PROCEDURE — 25000003 PHARM REV CODE 250: Performed by: INTERNAL MEDICINE

## 2022-10-06 PROCEDURE — 99222 PR INITIAL HOSPITAL CARE,LEVL II: ICD-10-PCS | Mod: ,,, | Performed by: INTERNAL MEDICINE

## 2022-10-06 PROCEDURE — A4216 STERILE WATER/SALINE, 10 ML: HCPCS | Performed by: INTERNAL MEDICINE

## 2022-10-06 PROCEDURE — 97116 GAIT TRAINING THERAPY: CPT | Mod: CQ

## 2022-10-06 PROCEDURE — 84100 ASSAY OF PHOSPHORUS: CPT | Performed by: INTERNAL MEDICINE

## 2022-10-06 PROCEDURE — 99900035 HC TECH TIME PER 15 MIN (STAT)

## 2022-10-06 PROCEDURE — 11000001 HC ACUTE MED/SURG PRIVATE ROOM

## 2022-10-06 PROCEDURE — 85025 COMPLETE CBC W/AUTO DIFF WBC: CPT | Performed by: INTERNAL MEDICINE

## 2022-10-06 PROCEDURE — 80053 COMPREHEN METABOLIC PANEL: CPT | Performed by: INTERNAL MEDICINE

## 2022-10-06 PROCEDURE — 83735 ASSAY OF MAGNESIUM: CPT | Performed by: INTERNAL MEDICINE

## 2022-10-06 PROCEDURE — 83605 ASSAY OF LACTIC ACID: CPT | Performed by: INTERNAL MEDICINE

## 2022-10-06 PROCEDURE — 80100014 HC HEMODIALYSIS 1:1

## 2022-10-06 PROCEDURE — 99222 1ST HOSP IP/OBS MODERATE 55: CPT | Mod: ,,, | Performed by: INTERNAL MEDICINE

## 2022-10-06 PROCEDURE — 94761 N-INVAS EAR/PLS OXIMETRY MLT: CPT

## 2022-10-06 PROCEDURE — 97535 SELF CARE MNGMENT TRAINING: CPT

## 2022-10-06 RX ADMIN — HEPARIN SODIUM 4000 UNITS: 1000 INJECTION, SOLUTION INTRAVENOUS; SUBCUTANEOUS at 12:10

## 2022-10-06 RX ADMIN — OXYCODONE HYDROCHLORIDE AND ACETAMINOPHEN 1 TABLET: 5; 325 TABLET ORAL at 03:10

## 2022-10-06 RX ADMIN — EPOETIN ALFA-EPBX 6800 UNITS: 10000 INJECTION, SOLUTION INTRAVENOUS; SUBCUTANEOUS at 12:10

## 2022-10-06 RX ADMIN — INSULIN DETEMIR 5 UNITS: 100 INJECTION, SOLUTION SUBCUTANEOUS at 09:10

## 2022-10-06 RX ADMIN — FUROSEMIDE 40 MG: 40 TABLET ORAL at 08:10

## 2022-10-06 RX ADMIN — LEVETIRACETAM 500 MG: 500 TABLET, FILM COATED ORAL at 09:10

## 2022-10-06 RX ADMIN — INSULIN ASPART 1 UNITS: 100 INJECTION, SOLUTION INTRAVENOUS; SUBCUTANEOUS at 09:10

## 2022-10-06 RX ADMIN — FLUOXETINE 20 MG: 20 CAPSULE ORAL at 08:10

## 2022-10-06 RX ADMIN — AMLODIPINE BESYLATE 10 MG: 5 TABLET ORAL at 08:10

## 2022-10-06 RX ADMIN — APIXABAN 5 MG: 2.5 TABLET, FILM COATED ORAL at 08:10

## 2022-10-06 RX ADMIN — LEVETIRACETAM 500 MG: 500 TABLET, FILM COATED ORAL at 08:10

## 2022-10-06 RX ADMIN — ISOSORBIDE MONONITRATE 120 MG: 60 TABLET, EXTENDED RELEASE ORAL at 08:10

## 2022-10-06 RX ADMIN — APIXABAN 5 MG: 2.5 TABLET, FILM COATED ORAL at 09:10

## 2022-10-06 RX ADMIN — ARIPIPRAZOLE 5 MG: 5 TABLET ORAL at 08:10

## 2022-10-06 RX ADMIN — MUPIROCIN: 20 OINTMENT TOPICAL at 09:10

## 2022-10-06 RX ADMIN — HYDRALAZINE HYDROCHLORIDE 100 MG: 25 TABLET, FILM COATED ORAL at 03:10

## 2022-10-06 RX ADMIN — FUROSEMIDE 40 MG: 40 TABLET ORAL at 09:10

## 2022-10-06 RX ADMIN — CARVEDILOL 25 MG: 25 TABLET, FILM COATED ORAL at 08:10

## 2022-10-06 RX ADMIN — Medication 10 ML: at 11:10

## 2022-10-06 RX ADMIN — FAMOTIDINE 20 MG: 20 TABLET, FILM COATED ORAL at 08:10

## 2022-10-06 RX ADMIN — OXYCODONE HYDROCHLORIDE AND ACETAMINOPHEN 1 TABLET: 5; 325 TABLET ORAL at 02:10

## 2022-10-06 RX ADMIN — INSULIN ASPART 4 UNITS: 100 INJECTION, SOLUTION INTRAVENOUS; SUBCUTANEOUS at 08:10

## 2022-10-06 RX ADMIN — CARVEDILOL 25 MG: 25 TABLET, FILM COATED ORAL at 09:10

## 2022-10-06 RX ADMIN — HYDRALAZINE HYDROCHLORIDE 100 MG: 25 TABLET, FILM COATED ORAL at 11:10

## 2022-10-06 RX ADMIN — MUPIROCIN: 20 OINTMENT TOPICAL at 08:10

## 2022-10-06 RX ADMIN — HYDRALAZINE HYDROCHLORIDE 100 MG: 25 TABLET, FILM COATED ORAL at 05:10

## 2022-10-06 NOTE — PROGRESS NOTES
10/06/22 1320   Required for all Hemodialysis Patients   Hepatitis Status negative   Handoff Report   Received From SHAILA Caraballo,dIALYSIS   Given To SHAILA Samuel   Treatment Type   Treatment Type Maintenance   Vital Signs   Temp 97.5 °F (36.4 °C)   Temp src Temporal   Pulse 74   Resp 14   Oxygen Concentration (%) 96   O2 Device (Oxygen Therapy) room air   /67   Assessments (Pre/Post)   Consent Obtained yes   Safety vein preservation armband present   Date Hepatitis Profile Obtained 07/07/22   Blood Liters Processed (BLP) 76   Transport Modality not applicable   Level of Consciousness (AVPU) responds to voice   Dialyzer Clearance mildly streaked        Hemodialysis Catheter 07/26/22 1457 right internal jugular   Placement Date/Time: 07/26/22 1457   Present Prior to Hospital Arrival?: No  Hand Hygiene: Performed  Barrier Precautions: Performed  Skin Antisepsis: ChloraPrep  Hemodialysis Catheter Type: Tunneled catheter  Location: right internal jugular  Cathete...   Line Necessity Review CRRT/HD   Site Assessment No drainage;No redness;No swelling;No warmth   Line Securement Device Secured with sutures   Dressing Type Biopatch in place   Dressing Status Clean;Dry;Intact   Dressing Intervention Sterile dressing change   Date on Dressing 10/06/22   Dressing Due to be Changed 10/13/22   Venous Patency/Care flushed w/o difficulty;heparin locked   Arterial Patency/Care flushed w/o difficulty;heparin locked   Post-Hemodialysis Assessment   Rinseback Volume (mL) 250 mL   Blood Volume Processed (Liters) 76 L   Dialyzer Clearance Lightly streaked   Duration of Treatment 195 minutes   Additional Fluid Intake (mL) 1300 mL   Total UF (mL) 4800 mL   Net Fluid Removal 3500   Patient Response to Treatment prbc 3 units administeredwith hd treatment. treatment toleratedw/o diff.   Post-Treatment Weight 57 kg (125 lb 10.6 oz)   Treatment Weight Change -3.1   Post-Hemodialysis Comments Report provided to SHAILA Samuel,post hd  "treatment.   Patient educated regarding sterile dressing change.  Stated "ok".  Treatment time increased by 15 minutes to administer prbc 3 units with hd treatment.,  Patient stable post hd treatment.  "

## 2022-10-06 NOTE — CONSULTS
[facility]  Adult Nutrition  Consult Note    SUMMARY     Recommendations   1) Advance PO diet to DM 1500 kcal, renal diet   2) Continue novasource TID   3) weigh s/p HD   4) Nutrition education if pt agreeable at f/u    Goals: 1) PO intakes > 75% of meals and supplements at f/u  Nutrition Goal Status: new  Communication of RD Recs:  (POC, sticky note)    Assessment and Plan    Malnutrition of moderate degree  Contributing Nutrition Diagnosis  Possible Moderate chronic condition related malnutrition    Related to (etiology):   Abd. Pain and decreased appetite    Signs and Symptoms (as evidenced by):   1) PO intakes < 75% of needs x > 1 month ( likely)  2) > 7.5% wt loss x 3 months    Interventions:  above    Nutrition Diagnosis Status:   new           Malnutrition Assessment     Skin (Micronutrient):  (Noam = 20)   Micronutrient Evaluation: suspected deficiency  Micronutrient Evaluation Comments: check iron   Weight Loss (Malnutrition): greater than 7.5% in 3 months     likely < 75% of needs x > 1 month      Edema (Fluid Accumulation): 0-->no edema present   Subcutaneous Fat Loss (Final Summary): well nourished  Muscle Loss Evaluation (Final Summary): well nourished         Reason for Assessment    Reason For Assessment: consult  Diagnosis:  (generalized abd. pain)  Relevant Medical History: ESRD on HD, gastroparesis, anemia, DM2  Interdisciplinary Rounds: attended    General Information Comments: 31 y/o female admitted with and. pain and N/V s/p missing 1 session of HD. Pt continues with HD inpatient. Per nursing note pt making herself throw up, has 10:10 abd. pain. Pt was too emotional for interview yesterday and at today's visit pt would not answer my questions either despite being awake, getting HD, barely shaking her head, unable to get a clear response. Had only taken 2-3 sips of novasource from her lunch tray. Visually appears nourished, no wasting. Significant wt loss per chart  "review.    Nutrition Discharge Planning: To be determined- renal, DM 1500 ckal diet    Nutrition Risk Screen    Nutrition Risk Screen: no indicators present    Nutrition/Diet History    Patient Reported Diet/Restrictions/Preferences: renal  Typical Food/Fluid Intake: I spoke with her 21- A1C dropped from 11-5.3 in recent years, does not eat a well-balanced diet, likes hamburgers. Renal education and handout given at that time.  Spiritual, Cultural Beliefs, Roman Catholic Practices, Values that Affect Care: no  Food Allergies: NKFA  Factors Affecting Nutritional Intake: abdominal pain, nausea/vomiting, decreased appetite    Anthropometrics    Temp: 97.5 °F (36.4 °C)  Height Method: Stated  Height: 5' 2"  Height (inches): 62 in  Weight Method: Bed Scale  Weight: 68 kg (149 lb 14.6 oz)  Weight (lb): 149.91 lb  Ideal Body Weight (IBW), Female: 110 lb  % Ideal Body Weight, Female (lb): 136.28 %  BMI (Calculated): 27.4  BMI Grade: 25 - 29.9 - overweight  Weight Loss: unintentional  Usual Body Weight (UBW), k.2 kg (22)  % Usual Body Weight: 89.43  % Weight Change From Usual Weight: -10.76 %       Lab/Procedures/Meds    Pertinent Labs Reviewed: reviewed  BMP  Lab Results   Component Value Date     10/06/2022    K 3.6 10/06/2022     10/06/2022    CO2 26 10/06/2022    BUN 17 10/06/2022    CREATININE 2.9 (H) 10/06/2022    CALCIUM 8.3 (L) 10/06/2022    ANIONGAP 11 10/06/2022    ESTGFRAFRICA 20 (A) 2022    EGFRNONAA 18 (A) 2022     Lab Results   Component Value Date    ALBUMIN 2.2 (L) 10/06/2022     Lab Results   Component Value Date    CALCIUM 8.3 (L) 10/06/2022    PHOS 4.5 10/06/2022     Recent Labs   Lab 10/06/22  1120   POCTGLUCOSE 151*     Lab Results   Component Value Date    HGBA1C 6.2 2022         Pertinent Medications Reviewed: reviewed  Pertinent Medications Comments: lasix, insulin, zofran, Kbicarb, KNaPhos    Estimated/Assessed Needs    Weight Used For Calorie Calculations: 68 " kg (149 lb 14.6 oz)  Energy Calorie Requirements (kcal): 25-30 kcal/kg (HD vs. overweight)= 4617-2568 kcal  Energy Need Method: Kcal/kg  Protein Requirements: 1.2 g protein/kg ( HD) 81 g  Weight Used For Protein Calculations: 68 kg (149 lb 14.6 oz)  Fluid Requirements (mL): 1500 ml or per MD  Estimated Fluid Requirement Method: other (see comments)  CHO Requirement: 191-233 g      Nutrition Prescription Ordered    Current Diet Order: Renal, full lquids + Novasource TID    Evaluation of Received Nutrient/Fluid Intake    Energy Calories Required: not meeting needs  Protein Required: not meeting needs  Fluid Required: not meeting needs  Tolerance: other (see comments) (and. pain)  % Intake of Estimated Energy Needs: 0-25% x 2 days  % Meal Intake: 0-25%    Nutrition Risk    Level of Risk/Frequency of Follow-up: moderate 2 x weekly      Monitor and Evaluation    Food and Nutrient Intake: energy intake, food and beverage intake  Food and Nutrient Adminstration: diet order  Anthropometric Measurements: weight  Biochemical Data, Medical Tests and Procedures: electrolyte and renal panel, gastrointestinal profile, glucose/endocrine profile  Nutrition-Focused Physical Findings: overall appearance       Nutrition Follow-Up    RD Follow-up?: Yes

## 2022-10-06 NOTE — PT/OT/SLP PROGRESS
"Physical Therapy Treatment    Patient Name:  Tabby Howard   MRN:  [id number]    Recommendations:     Discharge Recommendations:  home health PT   Discharge Equipment Recommendations: walker, rolling   Barriers to discharge: None    Assessment:     Tabby Howard is a 32 y.o. female admitted with a medical diagnosis of Generalized abdominal pain.  She presents with the following impairments/functional limitations:  weakness, impaired endurance, impaired functional mobility, gait instability, decreased safety awareness, impaired cognition.  Pt found L sidelying, agreeable to ambulate prior to dialysis. Ambulated 250' with RW support and SB-CGA; rated intensity of distance as "easy" but declined additional ambulation. Pt not talkative but participatory and appropriate throughout; stated abdominal pain from prior day had resolved.   Returned to L sidelying, bed alarm active, with OT Ruben entering room.     Rehab Prognosis: Good; patient would benefit from acute skilled PT services to address these deficits and reach maximum level of function.    Recent Surgery: * No surgery found *      Plan:     During this hospitalization, patient to be seen 6 x/week to address the identified rehab impairments via gait training, therapeutic activities, therapeutic exercises and progress toward the following goals:    Plan of Care Expires:  10/30/22    Subjective     Chief Complaint: cold when out from under covers  Patient/Family Comments/goals: none expressed  Pain/Comfort:  Pain Rating 1: 0/10      Objective:     Communicated with nurse Samuel prior to session.  Patient found left sidelying with telemetry, bed alarm upon PT entry to room.     General Precautions: Standard, fall   Orthopedic Precautions:N/A   Braces: N/A  Respiratory Status: Room air     Functional Mobility:  Bed Mobility:     Supine to Sit: stand by assistance  Sit to Supine: stand by assistance  Transfers:     Sit to Stand:  stand by assistance with rolling " "walker  Gait: 250' RW, SB-CGA, "easy" intensity; declined increased distance      AM-PAC 6 CLICK MOBILITY          Therapeutic Activities and Exercises:      Patient left left sidelying with all lines intact, call button in reach, bed alarm on, and nurse notified..    GOALS:   Multidisciplinary Problems       Physical Therapy Goals          Problem: Physical Therapy    Goal Priority Disciplines Outcome Goal Variances Interventions   Physical Therapy Goal     PT, PT/OT Ongoing, Progressing     Description: Goals to be met by: 10-     Patient will increase functional independence with mobility by performin. Supine to sit with Modified [place]  2. Sit to stand transfer with Contact Guard Assistance  3. Bed to chair transfer with Contact Guard Assistance using Rolling Walker  4. Gait  x 250 feet with Minimal Assistance using Rolling Walker.   5. Lower extremity exercise program x20 reps                        Time Tracking:     PT Received On: 10/06/22  PT Start Time: 856     PT Stop Time: 0910  PT Total Time (min): 14 min     Billable Minutes: Gait Training 14    Treatment Type: Treatment  PT/PTA: PTA     PTA Visit Number: 1     10/06/2022  "

## 2022-10-06 NOTE — CONSULTS
[facility]     CC: Abdominal pain    HPI 32 y.o. female extensive medical history including lap freedom , HTN, ESRD on HD, DM, transfusion dependent chronic anemia (multifactorial), CHF, DVT (Eliquis), sickle cell trait, and reported gastroparesis who was admitted with complaints of abdominal pain, nausea, vomiting and missed dialysis. She is uncooperative with my exam and will not answer any questions. She is lying in ball in her bed crying. Per nursing she has been offered pain medication which she has not accepted today. She required blood transfusion with dialysis and has no reported overt bleeding. She underwent EGD and colonoscopy less than 6 weeks ago, both of which were unremarkable and negative for active/overt bleeding (colonoscopy with poor prep and recommendation to repeat on outpatient basis).     Past Medical History:   Diagnosis Date    CKD (chronic kidney disease), stage IV 2022    Diabetes mellitus due to underlying condition with unspecified complications 2022    Gastroparesis 2022    Heart failure with preserved ejection fraction 2022    EF 55% on 3/22    History of gastroesophageal reflux (GERD)     History of supraventricular tachycardia     Hyperkalemia 2022    Hypertensive emergency 2022    Sickle cell trait 2022    Type 2 diabetes mellitus        Past Surgical History:   Procedure Laterality Date     SECTION      x 3    COLONOSCOPY      COLONOSCOPY N/A 2022    Procedure: COLONOSCOPY;  Surgeon: Jagdeep Cedeno MD;  Location: [facility];  Service: Endoscopy;  Laterality: N/A;    ESOPHAGOGASTRODUODENOSCOPY N/A 10/18/2019    Procedure: ESOPHAGOGASTRODUODENOSCOPY (EGD);  Surgeon: Gianluca Mendez MD;  Location: [facility];  Service: Endoscopy;  Laterality: N/A;    ESOPHAGOGASTRODUODENOSCOPY N/A 2022    Procedure: EGD (ESOPHAGOGASTRODUODENOSCOPY);  Surgeon: Micky Paredes III, MD;  Location: [facility];  Service: Endoscopy;   "Laterality: N/A;    LAPAROSCOPIC CHOLECYSTECTOMY N/A 07/30/2022    Procedure: CHOLECYSTECTOMY, LAPAROSCOPIC;  Surgeon: Grey Perez MD;  Location: [facility] OR;  Service: General;  Laterality: N/A;    PLACEMENT OF DUAL-LUMEN VASCULAR CATHETER Left 07/12/2022    Procedure: INSERTION-CATHETER-JOSEPH;  Surgeon: Dionte Gan MD;  Location: [facility] OR;  Service: General;  Laterality: Left;    PLACEMENT OF DUAL-LUMEN VASCULAR CATHETER Right 07/26/2022    Procedure: INSERTION-CATHETER-Hemosplit;  Surgeon: Dionte Gan MD;  Location: [facility] OR;  Service: General;  Laterality: Right;       Social History     Tobacco Use    Smoking status: Never    Smokeless tobacco: Never   Substance Use Topics    Alcohol use: No    Drug use: No       Family History   Problem Relation Age of Onset    Diabetes Mother     Diabetes Father        Allergies and Medications reviewed     Review of Systems  Unable to obtain as patient refuses to answer questions     Physical Examination  BP (!) 153/81 (BP Location: Right arm, Patient Position: Lying)   Pulse 79   Temp 98 °F (36.7 °C) (Oral)   Resp 12   Ht 5' 2.01" (1.575 m)   Wt 68 kg (150 lb)   SpO2 97%   Breastfeeding No   BMI 27.43 kg/m²   General appearance: alert, un-cooperative, lying in ball in bed crying   HENT: Normocephalic, atraumatic, neck symmetrical, no nasal discharge   Abdomen: nontender to brief palpation, no masses appreciated   Extremities: extremities symmetric; no clubbing, cyanosis, or edema      Labs:  Lab Results   Component Value Date    WBC 5.44 10/06/2022    HGB 6.4 (L) 10/06/2022    HCT 19.6 (LL) 10/06/2022    MCV 88 10/06/2022     10/06/2022       CMP  Sodium   Date Value Ref Range Status   10/06/2022 141 136 - 145 mmol/L Final     Potassium   Date Value Ref Range Status   10/06/2022 3.6 3.5 - 5.1 mmol/L Final     Chloride   Date Value Ref Range Status   10/06/2022 104 95 - 110 mmol/L Final     CO2   Date Value Ref Range Status   10/06/2022 26 23 - 29 " mmol/L Final     Glucose   Date Value Ref Range Status   10/06/2022 193 (H) 70 - 110 mg/dL Final     BUN   Date Value Ref Range Status   10/06/2022 17 6 - 20 mg/dL Final     Creatinine   Date Value Ref Range Status   10/06/2022 2.9 (H) 0.5 - 1.4 mg/dL Final     Calcium   Date Value Ref Range Status   10/06/2022 8.3 (L) 8.7 - 10.5 mg/dL Final     Total Protein   Date Value Ref Range Status   10/06/2022 5.2 (L) 6.0 - 8.4 g/dL Final     Albumin   Date Value Ref Range Status   10/06/2022 2.2 (L) 3.5 - 5.2 g/dL Final     Total Bilirubin   Date Value Ref Range Status   10/06/2022 0.9 0.1 - 1.0 mg/dL Final     Comment:     For infants and newborns, interpretation of results should be based  on gestational age, weight and in agreement with clinical  observations.    Premature Infant recommended reference ranges:  Up to 24 hours.............<8.0 mg/dL  Up to 48 hours............<12.0 mg/dL  3-5 days..................<15.0 mg/dL  6-29 days.................<15.0 mg/dL       Alkaline Phosphatase   Date Value Ref Range Status   10/06/2022 169 (H) 55 - 135 U/L Final     AST   Date Value Ref Range Status   10/06/2022 15 10 - 40 U/L Final     ALT   Date Value Ref Range Status   10/06/2022 15 10 - 44 U/L Final     Anion Gap   Date Value Ref Range Status   10/06/2022 11 8 - 16 mmol/L Final     eGFR if    Date Value Ref Range Status   07/31/2022 20 (A) >60 mL/min/1.73 m^2 Final     eGFR if non    Date Value Ref Range Status   07/31/2022 18 (A) >60 mL/min/1.73 m^2 Final     Comment:     Calculation used to obtain the estimated glomerular filtration  rate (eGFR) is the CKD-EPI equation.        -Iron- 52, TIBC- 246, Fe sat- 21%    August 2022:  -Ferritin- 14, 360,     Imaging:  CT abdomen/pelvis was independently visualized and reviewed by me and showed Diffuse anasarca.  Small right pleural effusion.  Moderate pericardial effusion.  Small amount of ascites adjacent to the liver, spleen and in the  pelvis.     Prior cholecystectomy.  Mild diverticulosis coli.    Assessment:   32 y.o. female extensive medical history including lap freedom 7/22, HTN, ESRD on HD, uncontrolled DM, transfusion dependent chronic anemia (multifactorial), CHF, DVT (Eliquis), sickle cell trait, and reported gastroparesis, GI consulted for abdominal pain , nausea and vomiting. Patient uncooperative with exam and refuses to answer questions. Recent EGD , colonoscopy and imaging unremarkable. Though she is crying she is non-toxic appearing and her vitals are stable. Her anemia is chronic and multifactorial, not due to overt GI bleeding.     Plan:  -Check lactic acid and KUB  -Recommend  4-5 small meals/day that are low in fat and fiber - liquid/pureed is often better tolerated in gastroparesis  -Supportive care with anti-emetics and pain medications if needed  -Hopefully able to discharge soon once symptoms are managed, however difficult to assess as patient uncooperative     Renata Holt MD  [facility]  [address], [address]  [place] [address]  Office: [phone]  Fax: [phone]

## 2022-10-06 NOTE — ASSESSMENT & PLAN NOTE
Noted.  Continue supportive care with sparing use of narcotics.  Continue use of antiemetics and proton pump inhibitor.  Advance diet as tolerated.  Awaiting GI specialist evaluation.

## 2022-10-06 NOTE — PLAN OF CARE
Problem: Oral Intake Inadequate (Acute Kidney Injury/Impairment)  Goal: Optimal Nutrition Intake  Outcome: Ongoing, Progressing  Patient encouraged to increase po fluids and meal intake

## 2022-10-06 NOTE — PLAN OF CARE
Recommendations   1) Advance PO diet to DM 1500 kcal, renal diet   2) Continue novasource TID   3) weigh s/p HD   4) Nutrition education if pt agreeable at f/u    Goals: 1) PO intakes > 75% of meals and supplements at f/u  Nutrition Goal Status: new  Communication of RD Recs:  (POC, sticky note)

## 2022-10-06 NOTE — PLAN OF CARE
Per rekha tanner with [facility] access, she is unable to schedule hospital follow up since pt has medicaid and has never been to their clinic before.

## 2022-10-06 NOTE — PROGRESS NOTES
[facility] Hospital Medicine  Progress Note    Patient Name: Tabby Howard  MRN: [id number]  Patient Class: IP- Inpatient   Admission Date: 10/4/2022  Length of Stay: 2 days  Attending Physician: Tosin Roberts MD  Primary Care Provider: [facility]        Subjective:     Principal Problem:Generalized abdominal pain        HPI:  Patient is a 32-year-old  female with past medical history significant for end-stage renal disease (on hemodialysis Tuesday/Thursday/Saturday), hypertension, uncontrolled diabetes mellitus type 2, seizure disorder, gastroparesis, history of deep venous thrombosis and secondary parathyroidism who is being admitted to Hospital Medicine under inpatient status from [facility] Emergency room with complaints of diffuse abdominal pain associated with nausea and vomiting for 1-2 days.  Patient reports compliance with hemodialysis therapy and was expected to undergo hemodialysis earlier this morning.  It reportedly patient's father dropped her off to the emergency room.  Patient is complaining of diffuse abdominal pain for which patient received intravenous morphine.  Patient also reports associated nausea and 1 episode of emesis.  Patient denies any hematemesis, hemoptysis, melena or bleeding per rectum.  No recent fevers or chills reported.  Denies any urinary complaints.  Upon arrival, patient noted to have elevated blood pressure of 195/111 mmHg.  Dr. Figueroa from Nephrology is arranging emergent hemodialysis treatment.            Overview/Hospital Course:  No notes on file    Interval History:  No new events noted overnight except for ongoing complaints of abdominal pain and nausea.  This morning looking somewhat better than previously.  Hemoglobin drop down to 6.4 grams/deciliter.  No obvious blood loss such as melena, bleeding per rectum or hematuria reported.  Patient is scheduled for back-to-back 3rd  round of hemodialysis with which patient will received 2 units of packed red blood cells.  Awaiting GI specialist evaluation for underlying gastroparesis.  Currently afebrile.    Review of Systems   Constitutional:  Positive for appetite change and fatigue.   Gastrointestinal:  Positive for abdominal pain, nausea and vomiting.   Musculoskeletal:  Positive for back pain.   Neurological:  Positive for weakness.   All other systems reviewed and are negative.  Objective:     Vital Signs (Most Recent):  Temp: 98 °F (36.7 °C) (10/06/22 0745)  Pulse: 79 (10/06/22 0745)  Resp: 12 (10/06/22 0745)  BP: (!) 153/81 (10/06/22 0745)  SpO2: 97 % (10/06/22 0745) Vital Signs (24h Range):  Temp:  [96.9 °F (36.1 °C)-98.4 °F (36.9 °C)] 98 °F (36.7 °C)  Pulse:  [70-84] 79  Resp:  [12-20] 12  SpO2:  [94 %-98 %] 97 %  BP: ()/(55-92) 153/81     Weight: 68 kg (150 lb)  Body mass index is 27.43 kg/m².    Intake/Output Summary (Last 24 hours) at 10/6/2022 0933  Last data filed at 10/6/2022 0644  Gross per 24 hour   Intake 780 ml   Output 3000 ml   Net -2220 ml        Physical Exam  Constitutional:       Appearance: She is well-developed.   HENT:      Head: Normocephalic and atraumatic.   Eyes:      Conjunctiva/sclera: Conjunctivae normal.      Pupils: Pupils are equal, round, and reactive to light.   Neck:      Thyroid: No thyromegaly.      Vascular: No JVD.   Cardiovascular:      Rate and Rhythm: Normal rate and regular rhythm.      Heart sounds: No murmur heard.    No friction rub. No gallop.   Pulmonary:      Effort: Pulmonary effort is normal.      Breath sounds: Normal breath sounds.   Abdominal:      General: Bowel sounds are normal. There is no distension.      Palpations: Abdomen is soft. There is no mass.      Tenderness: There is no abdominal tenderness.   Musculoskeletal:         General: Normal range of motion.      Cervical back: Neck supple.      Comments: 1+ pitting edema bilateral lower extremities, Homans sign negative  bilaterally.   Skin:     General: Skin is warm and dry.   Neurological:      Mental Status: She is oriented to person, place, and time.      Cranial Nerves: No cranial nerve deficit.   Psychiatric:         Behavior: Behavior normal.       Significant Labs: All pertinent labs within the past 24 hours have been reviewed.  CBC:   Recent Labs   Lab 10/05/22  0439 10/06/22  0429   WBC 7.08 5.44   HGB 7.3* 6.4*   HCT 21.4* 19.6*    152       CMP:   Recent Labs   Lab 10/05/22  0439 10/06/22  0429    141   K 4.1 3.6    104   CO2 23 26   * 193*   BUN 23* 17   CREATININE 3.1* 2.9*   CALCIUM 8.8 8.3*   PROT 5.7* 5.2*   ALBUMIN 2.3* 2.2*   BILITOT 1.3* 0.9   ALKPHOS 179* 169*   AST 21 15   ALT 16 15   ANIONGAP 12 11       Coagulation: No results for input(s): PT, INR, APTT in the last 48 hours.  Lactic Acid: No results for input(s): LACTATE in the last 48 hours.  Lipase:   No results for input(s): LIPASE in the last 48 hours.    Microbiology Results (last 7 days)       ** No results found for the last 168 hours. **          Significant Imaging:   CT abdomen and pelvis without contrast:  Diffuse anasarca.  Small right pleural effusion.  Moderate pericardial effusion.  Small amount of ascites adjacent to the liver, spleen and in the pelvis.  Prior cholecystectomy.  Mild diverticulosis coli.  Contrast:      CXR: Mild CHF, slightly improved from prior.      Assessment/Plan:      * Generalized abdominal pain  Unspecified.  CT abdomen and pelvis without contrast results reviewed, no acute intra-abdominal process noted.  Follow GI recommendations.  Continue supportive care with intravenous narcotics-sparing use.  Use antiemetics as needed.      Symptomatic anemia  Patient to receive 2 units of packed red blood cells with hemodialysis today.  Follow CBC and transfuse as needed.  Use of erythropoietin as per Nephrology team.      Malnutrition of moderate degree  Nutrition consulted. Most recent weight and BMI  monitored-   Nutrition consulted. Encourage maximal PO intake. Diet supplementation ordered per nutrition approval. Will encourage PO and monitor closely for weight changes.                          Measurements:  Wt Readings from Last 1 Encounters:   10/04/22 68 kg (150 lb)   Body mass index is 27.43 kg/m².    Recommendations:      Patient has been screened and assessed by RD. RD will follow patient.      Volume overload  Patient is getting back-to-back hemodialysis.  Routine hemodialysis therapy as per Nephrology.  Continue Lasix therapy.      HTN (hypertension)  Continue use of Norvasc, Tenormin, hydralazine, Lasix, Coreg and Imdur as before.  Use intravenous hydralazine 10 mg IV q.4 hours p.r.n. for systolic blood pressure above 160 mmHg.      Deep vein thrombosis (DVT) of non-extremity vein  Continue Eliquis as before.      ESRD (end stage renal disease)  Emergent hemodialysis is being arranged by Dr. Figueroa.  Follow Nephrology recommendation      Seizure  Continue Keppra use as before.  Also her fall and seizure precautions.      Diabetes mellitus due to underlying condition with unspecified complications  Patient's FSGs are uncontrolled due to hyperglycemia on current medication regimen.  Last A1c reviewed-   Lab Results   Component Value Date    HGBA1C 6.2 08/24/2022     Most recent fingerstick glucose reviewed-   Recent Labs   Lab 10/04/22  0845   POCTGLUCOSE 430*     Current correctional scale  Medium  Increase anti-hyperglycemic dose as follows-   Antihyperglycemics (From admission, onward)    None        Hold Oral hypoglycemics while patient is in the hospital.  Check BMP in the evening and repeat serum ketones in the morning.  If no improvement in glycemic control, will consider insulin infusion therapy.    Gastroparesis  Noted.  Continue supportive care with sparing use of narcotics.  Continue use of antiemetics and proton pump inhibitor.  Advance diet as tolerated.  Awaiting GI specialist  evaluation.    Patient encouraged to increase physical activity with assistance.  Likely DC home tomorrow.   to provide supportive resources upon discharge.  Case management to arrange and P at home and outpatient  visit.  VTE Risk Mitigation (From admission, onward)         Ordered     apixaban tablet 5 mg  2 times daily         10/04/22 1343     heparin (porcine) injection 5,000 Units  As needed (PRN)         10/04/22 1750     heparin (porcine) injection 4,000 Units  As needed (PRN)         10/04/22 1552     IP VTE HIGH RISK PATIENT  Once         10/04/22 1343     Place sequential compression device  Until discontinued         10/04/22 1343     Reason for No Pharmacological VTE Prophylaxis  Once        Question:  Reasons:  Answer:  Already adequately anticoagulated on oral Anticoagulants    10/04/22 1343                Discharge Planning   TATIANA: 10/7/2022     Code Status: Full Code   Is the patient medically ready for discharge?:     Reason for patient still in hospital (select all that apply): Patient trending condition and Consult recommendations  Discharge Plan A: Home with family                  Tosin Roberts MD  Department of Hospital Medicine   [facility]

## 2022-10-06 NOTE — CARE UPDATE
10/05/22 2015   Patient Assessment/Suction   Level of Consciousness (AVPU) alert   Respiratory Effort Normal;Unlabored   Expansion/Accessory Muscles/Retractions expansion symmetric;no retractions;no use of accessory muscles   PRE-TX-O2   O2 Device (Oxygen Therapy) room air   SpO2 96 %   Pulse Oximetry Type Intermittent   Aerosol Therapy   $ Aerosol Therapy Charges PRN treatment not required   Respiratory Treatment Status (SVN) PRN treatment not required

## 2022-10-06 NOTE — ASSESSMENT & PLAN NOTE
Patient to receive 2 units of packed red blood cells with hemodialysis today.  Follow CBC and transfuse as needed.  Use of erythropoietin as per Nephrology team.

## 2022-10-06 NOTE — CONSULTS
INPATIENT NEPHROLOGY CONSULT   Health system NEPHROLOGY    Tabby Howard  10/06/2022    Reason for consultation:  esrd    Chief Complaint:   Chief Complaint   Patient presents with    Abdominal Pain     With NV that started yesterday. Did not got to dialysis today went Saturday          History of Present Illness:    Pt was dropped off by her dad because he didn't have time to take her to dialysis.  She has a h/o ESRD, hypertension, diabetes, anemia, secondary hyperparathyroidism, SVT, DVT, Cholecystectomy,  and GERD.  She is tearful, complaining of abdominal pain, sob, fatigue.  C/o nausea and vomiting as well.  No angina.  No new neuro symptoms.   Good history hard to obtain given her emotional state    10/5  net UF 2.5 liters.  Less less sob.  C/o abd pain.  Nausea  10/6  no distress.  Sleeping   AFVSS    Plan of Care:       Assessment:    esrd  --continue dialysis per routine  --fluid restrict  --renal dose medication per routine  --continue outpt medication  --continue binders with meals    Anemia  --transfuse with hd today  Goal hg in esrd patient on hemodialysis is 10-12  --erythropoiesis stimulating agent with renal replacement therapy      Hypervolemia  --ultrafilter as tolerated  --supplemental uf performed 10/4  --fluid restrict   --low salt diet    Hypertension  --better after ultrafiltration  --low salt diet  --fluid restrict  --continue outpatient bp medication     H/O hyperphosphatemia  --at goal  --continue Sevelamer   --renal diet    Thank you for allowing us to participate in this patient's care. We will continue to follow.    Vital Signs:  Temp Readings from Last 3 Encounters:   10/06/22 98.4 °F (36.9 °C) (Oral)   09/03/22 98.2 °F (36.8 °C) (Oral)   09/02/22 98.8 °F (37.1 °C) (Oral)       Pulse Readings from Last 3 Encounters:   10/06/22 84   09/03/22 84   09/02/22 95       BP Readings from Last 3 Encounters:   10/06/22 (!) 156/92   09/03/22 (!) 161/86   09/02/22 (!) 156/94       Weight:  Wt  Readings from Last 3 Encounters:   10/04/22 68 kg (150 lb)   22 70 kg (154 lb 5.2 oz)   22 74.8 kg (165 lb)       Past Medical & Surgical History:  Past Medical History:   Diagnosis Date    CKD (chronic kidney disease), stage IV 2022    Diabetes mellitus due to underlying condition with unspecified complications 2022    Gastroparesis 2022    Heart failure with preserved ejection fraction 2022    EF 55% on 3/22    History of gastroesophageal reflux (GERD)     History of supraventricular tachycardia     Hyperkalemia 2022    Hypertensive emergency 2022    Sickle cell trait 2022    Type 2 diabetes mellitus        Past Surgical History:   Procedure Laterality Date     SECTION      x 3    COLONOSCOPY      COLONOSCOPY N/A 2022    Procedure: COLONOSCOPY;  Surgeon: Jagdeep Cedeno MD;  Location: [facility];  Service: Endoscopy;  Laterality: N/A;    ESOPHAGOGASTRODUODENOSCOPY N/A 10/18/2019    Procedure: ESOPHAGOGASTRODUODENOSCOPY (EGD);  Surgeon: Gianluca Mendez MD;  [facility];  Service: Endoscopy;  Laterality: N/A;    ESOPHAGOGASTRODUODENOSCOPY N/A 2022    Procedure: EGD (ESOPHAGOGASTRODUODENOSCOPY);  Surgeon: Micky Paredes III, MD;  Location: [facility];  Service: Endoscopy;  Laterality: N/A;    LAPAROSCOPIC CHOLECYSTECTOMY N/A 2022    Procedure: CHOLECYSTECTOMY, LAPAROSCOPIC;  Surgeon: Grey Perez MD;  Location: [facility];  Service: General;  Laterality: N/A;    PLACEMENT OF DUAL-LUMEN VASCULAR CATHETER Left 2022    Procedure: INSERTION-CATHETER-JOSEPH;  Surgeon: Dionte Gan MD;  Location: [facility] OR;  Service: General;  Laterality: Left;    PLACEMENT OF DUAL-LUMEN VASCULAR CATHETER Right 2022    Procedure: INSERTION-CATHETER-Hemosplit;  Surgeon: Dionte Gan MD;  Location: [facility] OR;  Service: General;  Laterality: Right;       Past Social History:  Social History     Socioeconomic History    Marital status:     Tobacco Use    Smoking status: Never    Smokeless tobacco: Never   Substance and Sexual Activity    Alcohol use: No    Drug use: No    Sexual activity: Not Currently     Partners: Male     Birth control/protection: I.U.D.     Social Determinants of Health     Financial Resource Strain: Unknown    Difficulty of Paying Living Expenses: Patient refused   Food Insecurity: Unknown    Worried About Running Out of Food in the Last Year: Patient refused    Ran Out of Food in the Last Year: Patient refused   Transportation Needs: Unmet Transportation Needs    Lack of Transportation (Medical): Yes    Lack of Transportation (Non-Medical): Yes   Physical Activity: Unknown    Days of Exercise per Week: Patient refused    Minutes of Exercise per Session: Patient refused   Stress: Unknown    Feeling of Stress : Patient refused   Social Connections: Unknown    Frequency of Communication with Friends and Family: Patient refused    Frequency of Social Gatherings with Friends and Family: Patient refused    Attends Confucianist Services: Patient refused    Active Member of Clubs or Organizations: Patient refused    Attends Club or Organization Meetings: Patient refused    Marital Status: Patient refused   Housing Stability: Unknown    Unable to Pay for Housing in the Last Year: Patient refused    Unstable Housing in the Last Year: Patient refused       Medications:  No current facility-administered medications on file prior to encounter.     Current Outpatient Medications on File Prior to Encounter   Medication Sig Dispense Refill    albuterol (PROVENTIL/VENTOLIN HFA) 90 mcg/actuation inhaler Inhale 2 puffs into the lungs every 6 (six) hours as needed for Wheezing. Rescue      amLODIPine (NORVASC) 10 MG tablet Take 1 tablet (10 mg total) by mouth once daily. 30 tablet 11    apixaban (ELIQUIS) 5 mg Tab Take 1 tablet (5 mg total) by mouth 2 (two) times daily. For second month on. 60 tablet 2    ARIPiprazole (ABILIFY) 5 MG Tab Take 5 mg by  mouth once daily.      bisacodyL (DULCOLAX) 5 mg EC tablet Take 2 tablets (10 mg total) by mouth daily as needed for Constipation. (Patient not taking: Reported on 8/23/2022) 14 tablet 0    carvediloL (COREG) 25 MG tablet Take 1 tablet (25 mg total) by mouth 2 (two) times daily. 60 tablet 2    cloNIDine 0.2 mg/24 hr td ptwk (CATAPRES) 0.2 mg/24 hr Place 1 patch onto the skin every 7 days. (Patient not taking: Reported on 8/23/2022) 4 patch 2    desvenlafaxine succinate (PRISTIQ) 50 MG Tb24 Take 50 mg by mouth every other day.      famotidine (PEPCID) 20 MG tablet Take 1 tablet (20 mg total) by mouth once daily. 30 tablet 0    FLUoxetine 20 MG capsule Take 1 capsule (20 mg total) by mouth once daily. 30 capsule 1    furosemide (LASIX) 40 MG tablet Take 1 tablet (40 mg total) by mouth 2 (two) times daily. (Patient not taking: No sig reported) 60 tablet 0    hydrALAZINE (APRESOLINE) 100 MG tablet Take 1 tablet (100 mg total) by mouth every 8 (eight) hours. 90 tablet 2    insulin aspart U-100 (NOVOLOG) 100 unit/mL (3 mL) InPn pen Inject 1-10 Units into the skin before meals and at bedtime as needed (Hyperglycemia). 3 mL 3    isosorbide mononitrate (IMDUR) 60 MG 24 hr tablet Take 2 tablets (120 mg total) by mouth once daily. 30 tablet 1    LANTUS U-100 INSULIN 100 unit/mL injection Inject 5 Units into the skin once daily.      levETIRAcetam (KEPPRA) 500 MG Tab Take 1 tablet (500 mg total) by mouth 2 (two) times daily. 60 tablet 1    ondansetron (ZOFRAN-ODT) 4 MG TbDL Take 1 tablet (4 mg total) by mouth every 8 (eight) hours as needed (nausea, vomiting). 12 tablet 0    oxyCODONE-acetaminophen (PERCOCET) 5-325 mg per tablet Take 1 tablet by mouth every 6 (six) hours as needed for Pain. (Patient not taking: No sig reported) 20 tablet 0    pantoprazole (PROTONIX) 40 MG tablet Take 1 tablet (40 mg total) by mouth once daily. 30 tablet 0    polyethylene glycol (GLYCOLAX) 17 gram/dose powder Take 17 g by mouth once daily.  "(Patient not taking: No sig reported) 510 g 1    [DISCONTINUED] atenoloL (TENORMIN) 50 MG tablet Take 1 tablet (50 mg total) by mouth every other day. 45 tablet 3    [DISCONTINUED] omeprazole (PRILOSEC) 20 MG capsule Take 2 capsules (40 mg total) by mouth once daily. for 10 days 20 capsule 0    [DISCONTINUED] torsemide (DEMADEX) 20 MG Tab Take 1 tablet (20 mg total) by mouth 2 (two) times a day. (Patient taking differently: Take 20 mg by mouth once daily.) 60 tablet 1     Scheduled Meds:   sodium chloride 0.9%   Intravenous Once    sodium chloride 0.9%   Intravenous Once    amLODIPine  10 mg Oral Daily    apixaban  5 mg Oral BID    ARIPiprazole  5 mg Oral Daily    carvediloL  25 mg Oral BID    cloNIDine 0.2 mg/24 hr td ptwk  1 patch Transdermal Q7 Days    epoetin teresa-epbx  6,800 Units Intravenous Every Tues, Thurs, Sat    famotidine  20 mg Oral Daily    FLUoxetine  20 mg Oral Daily    furosemide  40 mg Oral BID    hydrALAZINE  100 mg Oral Q8H    insulin detemir U-100  5 Units Subcutaneous QHS    isosorbide mononitrate  120 mg Oral Daily    levETIRAcetam  500 mg Oral BID    mupirocin   Nasal BID    sodium chloride 0.9%  10 mL Intravenous Q8H     Continuous Infusions:  PRN Meds:.    Allergies:  Penicillins    Past Family History:  Reviewed; refer to Hospitalist Admission Note    Review of Systems:  Review of Systems - All 14 systems reviewed and negative, except as noted in HPI    Physical Exam:    BP (!) 156/92   Pulse 84   Temp 98.4 °F (36.9 °C) (Oral)   Resp 18   Ht 5' 2.01" (1.575 m)   Wt 68 kg (150 lb)   SpO2 98%   Breastfeeding No   BMI 27.43 kg/m²     General Appearance:    Tearful    Head:    Normocephalic, without obvious abnormality, atraumatic   Eyes:    PER, conjunctiva/corneas clear, EOM's intact in both eyes        Throat:   Lips, mucosa, and tongue normal; teeth and gums normal   Back:     Symmetric, no curvature, ROM normal, no CVA tenderness   Lungs:     diminished   Chest wall:    No " tenderness or deformity   Heart:    Regular rate and rhythm, S1 and S2 normal, no murmur, rub   or gallop   Abdomen:     Tender to palpation.  Can be distracted   Extremities:   edema   Pulses:   2+ and symmetric all extremities   MSK:   No joint or muscle swelling, tenderness or deformity   Skin:   Skin color, texture, turgor normal, no rashes or lesions   Neurologic:    .  No flap     Results:  Lab Results   Component Value Date     10/06/2022    K 3.6 10/06/2022     10/06/2022    CO2 26 10/06/2022    BUN 17 10/06/2022    CREATININE 2.9 (H) 10/06/2022    CALCIUM 8.3 (L) 10/06/2022    ANIONGAP 11 10/06/2022    ESTGFRAFRICA 20 (A) 07/31/2022    EGFRNONAA 18 (A) 07/31/2022       Lab Results   Component Value Date    CALCIUM 8.3 (L) 10/06/2022    PHOS 4.5 10/06/2022       Recent Labs   Lab 10/06/22  0429   WBC 5.44   RBC 2.23*   HGB 6.4*   HCT 19.6*      MCV 88   MCH 28.7   MCHC 32.7          Imaging Results              CT Abdomen Pelvis  Without Contrast (Final result)  Result time 10/04/22 11:00:32      Final result by Gianluca Arriaga Jr., MD (10/04/22 11:00:32)                   Impression:      Diffuse anasarca.  Small right pleural effusion.  Moderate pericardial effusion.  Small amount of ascites adjacent to the liver, spleen and in the pelvis.    Prior cholecystectomy.  Mild diverticulosis coli.      Electronically signed by: Gianluca Arriaga MD  Date:    10/04/2022  Time:    11:00               Narrative:    EXAMINATION:  CT ABDOMEN PELVIS WITHOUT CONTRAST    CLINICAL HISTORY:  Nausea/vomiting;    TECHNIQUE:  Low dose axial images, sagittal and coronal reformations were obtained from the lung bases to the pubic symphysis.  30 mL of oral Omnipaque 350 was administered.    COMPARISON:  Chest x-ray of October 4, 2022    FINDINGS:  There is diffuse anasarca throughout the chest abdomen and pelvis.  There is a small right pleural effusion moderate pericardial effusion noted.  Small amount of  ascites is seen adjacent to the liver, spleen and in the pelvis.    The liver is of normal size and CT density.  The gallbladder is absent with surgical clips noted.  The visualized pancreas appears within normal limits.  The spleen is of normal size and CT density.    The adrenal glands are not enlarged.  The kidneys are of normal size and CT density for a noncontrast study.  Stone or hydronephrosis is not seen.  The abdominal aorta and inferior vena cava are of normal caliber.    The stomach is of normal configuration.  Small bowel dilatation or air-fluid levels are not seen.  The colon is of normal configuration with few diverticuli of the sigmoid colon without CT evidence of diverticulitis.    Bladder is of normal contour without mass or asymmetry.  The uterus is within normal limits.                                       X-Ray Chest AP Portable (Final result)  Result time 10/04/22 09:42:05      Final result by Kaushik Gutierrez MD (10/04/22 09:42:05)                   Impression:      Mild CHF, slightly improved from prior.      Electronically signed by: Kaushik Gutierrez  Date:    10/04/2022  Time:    09:42               Narrative:    EXAMINATION:  XR CHEST AP PORTABLE    CLINICAL HISTORY:  hyperglycemia;    TECHNIQUE:  Single frontal view of the chest was performed.    COMPARISON:  09/02/2022    FINDINGS:  Bibasilar interstitial disease.  Enlarged cardiac silhouette.  Right-sided central line with tip projecting over the right heart border presumably in the right atrium.  No pleural effusion or pneumothorax.                                        I have personally reviewed pertinent radiological imaging and reports.    Patient care was time spent personally by me on the following activities:   Obtaining a history  Examination of patient.  Providing medical care at the patients bedside.  Developing a treatment plan with patient or surrogate and bedside caregivers  Ordering and reviewing laboratory studies,  radiographic studies, pulse oximetry.  Ordering and performing treatments and interventions.  Evaluation of patient's response to treatment.  Discussions with consultants while on the unit and immediately available to the patient.  Re-evaluation of the patient's condition.  Documentation in the medical record.     Hugh Figueroa MD  Nephrology  [facility]  [phone]

## 2022-10-06 NOTE — PLAN OF CARE
Problem: Physical Therapy  Goal: Physical Therapy Goal  Description: Goals to be met by: 10-     Patient will increase functional independence with mobility by performin. Supine to sit with Modified [place]  2. Sit to stand transfer with Contact Guard Assistance  3. Bed to chair transfer with Contact Guard Assistance using Rolling Walker  4. Gait  x 250 feet with Minimal Assistance using Rolling Walker.   5. Lower extremity exercise program x20 reps   Outcome: Ongoing, Progressing

## 2022-10-06 NOTE — HOSPITAL COURSE
Patient was admitted to medicine telemetry service.  Nephrology team was consulted.  Patient underwent emergent hemodialysis with significant volume removal.  Patient has required back-to-back 3 consecutive hemodialysis treatments.  During hospital stay patient continued to complain of persisting abdominal pain associated with nausea and vomiting suggestive of severe gastroparesis.  GI specialist is consulted.  Patient also required 2 units of packed red blood cells for symptomatic anemia during hospital stay.   and  worked closely with the patient and [place] patient to receive outpatient resources including NP at home and outpatient social work monitoring plan.

## 2022-10-06 NOTE — ASSESSMENT & PLAN NOTE
Unspecified.  CT abdomen and pelvis without contrast results reviewed, no acute intra-abdominal process noted.  Follow GI recommendations.  Continue supportive care with intravenous narcotics-sparing use.  Use antiemetics as needed.

## 2022-10-06 NOTE — ASSESSMENT & PLAN NOTE
Contributing Nutrition Diagnosis  Possible Moderate chronic condition related malnutrition    Related to (etiology):   Abd. Pain and decreased appetite    Signs and Symptoms (as evidenced by):   1) PO intakes < 75% of needs x > 1 month ( likely)  2) > 7.5% wt loss x 3 months    Interventions:  above    Nutrition Diagnosis Status:   new

## 2022-10-06 NOTE — SUBJECTIVE & OBJECTIVE
Interval History:  No new events noted overnight except for ongoing complaints of abdominal pain and nausea.  This morning looking somewhat better than previously.  Hemoglobin drop down to 6.4 grams/deciliter.  No obvious blood loss such as melena, bleeding per rectum or hematuria reported.  Patient is scheduled for back-to-back 3rd round of hemodialysis with which patient will received 2 units of packed red blood cells.  Awaiting GI specialist evaluation for underlying gastroparesis.  Currently afebrile.    Review of Systems   Constitutional:  Positive for appetite change and fatigue.   Gastrointestinal:  Positive for abdominal pain, nausea and vomiting.   Musculoskeletal:  Positive for back pain.   Neurological:  Positive for weakness.   All other systems reviewed and are negative.  Objective:     Vital Signs (Most Recent):  Temp: 98 °F (36.7 °C) (10/06/22 0745)  Pulse: 79 (10/06/22 0745)  Resp: 12 (10/06/22 0745)  BP: (!) 153/81 (10/06/22 0745)  SpO2: 97 % (10/06/22 0745) Vital Signs (24h Range):  Temp:  [96.9 °F (36.1 °C)-98.4 °F (36.9 °C)] 98 °F (36.7 °C)  Pulse:  [70-84] 79  Resp:  [12-20] 12  SpO2:  [94 %-98 %] 97 %  BP: ()/(55-92) 153/81     Weight: 68 kg (150 lb)  Body mass index is 27.43 kg/m².    Intake/Output Summary (Last 24 hours) at 10/6/2022 0933  Last data filed at 10/6/2022 0644  Gross per 24 hour   Intake 780 ml   Output 3000 ml   Net -2220 ml        Physical Exam  Constitutional:       Appearance: She is well-developed.   HENT:      Head: Normocephalic and atraumatic.   Eyes:      Conjunctiva/sclera: Conjunctivae normal.      Pupils: Pupils are equal, round, and reactive to light.   Neck:      Thyroid: No thyromegaly.      Vascular: No JVD.   Cardiovascular:      Rate and Rhythm: Normal rate and regular rhythm.      Heart sounds: No murmur heard.    No friction rub. No gallop.   Pulmonary:      Effort: Pulmonary effort is normal.      Breath sounds: Normal breath sounds.   Abdominal:       General: Bowel sounds are normal. There is no distension.      Palpations: Abdomen is soft. There is no mass.      Tenderness: There is no abdominal tenderness.   Musculoskeletal:         General: Normal range of motion.      Cervical back: Neck supple.      Comments: 1+ pitting edema bilateral lower extremities, Homans sign negative bilaterally.   Skin:     General: Skin is warm and dry.   Neurological:      Mental Status: She is oriented to person, place, and time.      Cranial Nerves: No cranial nerve deficit.   Psychiatric:         Behavior: Behavior normal.       Significant Labs: All pertinent labs within the past 24 hours have been reviewed.  CBC:   Recent Labs   Lab 10/05/22  0439 10/06/22  0429   WBC 7.08 5.44   HGB 7.3* 6.4*   HCT 21.4* 19.6*    152       CMP:   Recent Labs   Lab 10/05/22  0439 10/06/22  0429    141   K 4.1 3.6    104   CO2 23 26   * 193*   BUN 23* 17   CREATININE 3.1* 2.9*   CALCIUM 8.8 8.3*   PROT 5.7* 5.2*   ALBUMIN 2.3* 2.2*   BILITOT 1.3* 0.9   ALKPHOS 179* 169*   AST 21 15   ALT 16 15   ANIONGAP 12 11       Coagulation: No results for input(s): PT, INR, APTT in the last 48 hours.  Lactic Acid: No results for input(s): LACTATE in the last 48 hours.  Lipase:   No results for input(s): LIPASE in the last 48 hours.    Microbiology Results (last 7 days)       ** No results found for the last 168 hours. **          Significant Imaging:   CT abdomen and pelvis without contrast:  Diffuse anasarca.  Small right pleural effusion.  Moderate pericardial effusion.  Small amount of ascites adjacent to the liver, spleen and in the pelvis.  Prior cholecystectomy.  Mild diverticulosis coli.  Contrast:      CXR: Mild CHF, slightly improved from prior.

## 2022-10-06 NOTE — PT/OT/SLP EVAL
Occupational Therapy   Evaluation and Discharge Note    Name: Tabby Howard  MRN: [id number]  Admitting Diagnosis:  Generalized abdominal pain   Recent Surgery: * No surgery found *      Recommendations:     Discharge Recommendations: home  Discharge Equipment Recommendations:  walker, rolling  Barriers to discharge:  None    Assessment:     Tabby Howard is a 32 y.o. female with a medical diagnosis of Generalized abdominal pain. At this time, patient is Supervision level with ADLs. Patient does not require further acute OT services.     Plan:     During this hospitalization, patient does not require further acute OT services.  Please re-consult if situation changes.    Plan of Care Reviewed with: patient    Subjective     Chief Complaint: none  Patient/Family Comments/goals: none    Occupational Profile:  Living Environment: Patient lives with stepfather.   Previous level of function: Patient was independent with ADLs and mobility.   Equipment Used at home:  none  Assistance upon Discharge: Patient will receive assistance from family    Pain/Comfort:  Pain Rating 1:  (not rated)  Location - Orientation 1: generalized  Location 1: abdomen  Pain Addressed 1: Reposition, Distraction  Pain Rating Post-Intervention 1:  (not rated)    Patients cultural, spiritual, Amish conflicts given the current situation:      Objective:     Communicated with: nurse Samuel prior to session.  Patient found HOB elevated with bed alarm upon OT entry to room.    General Precautions: Standard, fall   Orthopedic Precautions:N/A   Braces: N/A  Respiratory Status: Room air     Occupational Performance:    Bed Mobility:    Patient completed Scooting/Bridging with stand by assistance  Patient completed Supine to Sit with stand by assistance  Patient completed Sit to Supine with stand by assistance    Functional Mobility/Transfers:  Patient completed Sit <> Stand Transfer with stand by assistance  with  rolling walker   Patient completed  Toilet Transfer Stand Pivot technique with stand by assistance with  rolling walker    Activities of Daily Living:  Grooming: supervision with all grooming tasks while standing at sink  Upper Body Dressing: supervision   Lower Body Dressing: supervision to don/doff socks while seated EOB  Toileting: supervision     Cognitive/Visual Perceptual:  Cognitive/Psychosocial Skills:     -       Oriented to: x4   -       Follows Commands/attention:Follows multistep  commands  -       Communication: clear/fluent  -       Safety awareness/insight to disability: intact   -       Mood/Affect/Coping skills/emotional control: Appropriate to situation and Cooperative  Visual/Perceptual:      -Intact     Physical Exam:  Postural examination/scapula alignment:    -       Rounded shoulders  -       Forward head  Upper Extremity Range of Motion:     -       Right Upper Extremity: WFL  -       Left Upper Extremity: WFL  Upper Extremity Strength:    -       Right Upper Extremity: WFL  -       Left Upper Extremity: WFL   Strength:    -       Right Upper Extremity: WFL  -       Left Upper Extremity: WFL  Fine Motor Coordination:    -       Intact  Gross motor coordination:   WFL    AMPAC 6 Click ADL:  AMPAC Total Score: 24    Treatment & Education:  OT ed pt on OT role & POC as well as discharge recommendations.  OT ed patient on safety with walker use for functional mobility with cues for hand placement & sequencing.       Patient left HOB elevated with all lines intact, call button in reach, and student nurse and dialysis nurse present    GOALS:   Multidisciplinary Problems       Occupational Therapy Goals       Not on file                    History:     Past Medical History:   Diagnosis Date    CKD (chronic kidney disease), stage IV 4/12/2022    Diabetes mellitus due to underlying condition with unspecified complications 4/12/2022    Gastroparesis 4/12/2022    Heart failure with preserved ejection fraction 4/12/2022    EF 55% on  3/22    History of gastroesophageal reflux (GERD)     History of supraventricular tachycardia     Hyperkalemia 2022    Hypertensive emergency 2022    Sickle cell trait 2022    Type 2 diabetes mellitus          Past Surgical History:   Procedure Laterality Date     SECTION      x 3    COLONOSCOPY      COLONOSCOPY N/A 2022    Procedure: COLONOSCOPY;  Surgeon: Jagdeep Cedeno MD;  Location: [facility];  Service: Endoscopy;  Laterality: N/A;    ESOPHAGOGASTRODUODENOSCOPY N/A 10/18/2019    Procedure: ESOPHAGOGASTRODUODENOSCOPY (EGD);  Surgeon: Gianluca Mendez MD;  Location: [facility];  Service: Endoscopy;  Laterality: N/A;    ESOPHAGOGASTRODUODENOSCOPY N/A 2022    Procedure: EGD (ESOPHAGOGASTRODUODENOSCOPY);  Surgeon: Micky Paredes III, MD;  Location: [facility];  Service: Endoscopy;  Laterality: N/A;    LAPAROSCOPIC CHOLECYSTECTOMY N/A 2022    Procedure: CHOLECYSTECTOMY, LAPAROSCOPIC;  Surgeon: Grey Perez MD;  Location: [facility] OR;  Service: General;  Laterality: N/A;    PLACEMENT OF DUAL-LUMEN VASCULAR CATHETER Left 2022    Procedure: INSERTION-CATHETER-JOSEPH;  Surgeon: Dionte Gan MD;  Location: [facility] OR;  Service: General;  Laterality: Left;    PLACEMENT OF DUAL-LUMEN VASCULAR CATHETER Right 2022    Procedure: INSERTION-CATHETER-Hemosplit;  Surgeon: Dionte Gan MD;  Location: [facility] OR;  Service: General;  Laterality: Right;       Time Tracking:     OT Date of Treatment: 10/06/22  OT Start Time: 910  OT Stop Time: 926  OT Total Time (min): 16 min    Billable Minutes:Evaluation 6  Self Care/Home Management 10    10/6/2022

## 2022-10-07 ENCOUNTER — TELEPHONE (OUTPATIENT)
Dept: GASTROENTEROLOGY | Facility: CLINIC | Age: 32
End: 2022-10-07
Payer: MEDICAID

## 2022-10-07 VITALS
OXYGEN SATURATION: 94 % | TEMPERATURE: 97 F | DIASTOLIC BLOOD PRESSURE: 65 MMHG | SYSTOLIC BLOOD PRESSURE: 115 MMHG | RESPIRATION RATE: 18 BRPM | HEART RATE: 76 BPM | BODY MASS INDEX: 27.59 KG/M2 | WEIGHT: 149.94 LBS | HEIGHT: 62 IN

## 2022-10-07 LAB
ALBUMIN SERPL BCP-MCNC: 2.4 G/DL (ref 3.5–5.2)
ALP SERPL-CCNC: 179 U/L (ref 55–135)
ALT SERPL W/O P-5'-P-CCNC: 15 U/L (ref 10–44)
ANION GAP SERPL CALC-SCNC: 10 MMOL/L (ref 8–16)
AST SERPL-CCNC: 15 U/L (ref 10–40)
BASOPHILS # BLD AUTO: 0.04 K/UL (ref 0–0.2)
BASOPHILS NFR BLD: 0.6 % (ref 0–1.9)
BILIRUB SERPL-MCNC: 1.1 MG/DL (ref 0.1–1)
BUN SERPL-MCNC: 10 MG/DL (ref 6–20)
CALCIUM SERPL-MCNC: 8.9 MG/DL (ref 8.7–10.5)
CHLORIDE SERPL-SCNC: 110 MMOL/L (ref 95–110)
CO2 SERPL-SCNC: 21 MMOL/L (ref 23–29)
CREAT SERPL-MCNC: 2.2 MG/DL (ref 0.5–1.4)
DIFFERENTIAL METHOD: ABNORMAL
EOSINOPHIL # BLD AUTO: 0 K/UL (ref 0–0.5)
EOSINOPHIL NFR BLD: 0.6 % (ref 0–8)
ERYTHROCYTE [DISTWIDTH] IN BLOOD BY AUTOMATED COUNT: 14.7 % (ref 11.5–14.5)
EST. GFR  (NO RACE VARIABLE): 30 ML/MIN/1.73 M^2
GLUCOSE SERPL-MCNC: 96 MG/DL (ref 70–110)
HCT VFR BLD AUTO: 34.7 % (ref 37–48.5)
HGB BLD-MCNC: 12.4 G/DL (ref 12–16)
IMM GRANULOCYTES # BLD AUTO: 0.03 K/UL (ref 0–0.04)
IMM GRANULOCYTES NFR BLD AUTO: 0.4 % (ref 0–0.5)
LYMPHOCYTES # BLD AUTO: 1.5 K/UL (ref 1–4.8)
LYMPHOCYTES NFR BLD: 20.5 % (ref 18–48)
MAGNESIUM SERPL-MCNC: 1.8 MG/DL (ref 1.6–2.6)
MCH RBC QN AUTO: 30.1 PG (ref 27–31)
MCHC RBC AUTO-ENTMCNC: 35.7 G/DL (ref 32–36)
MCV RBC AUTO: 84 FL (ref 82–98)
MONOCYTES # BLD AUTO: 0.6 K/UL (ref 0.3–1)
MONOCYTES NFR BLD: 8.5 % (ref 4–15)
NEUTROPHILS # BLD AUTO: 4.9 K/UL (ref 1.8–7.7)
NEUTROPHILS NFR BLD: 69.4 % (ref 38–73)
NRBC BLD-RTO: 0 /100 WBC
PHOSPHATE SERPL-MCNC: 3.1 MG/DL (ref 2.7–4.5)
PLATELET # BLD AUTO: 161 K/UL (ref 150–450)
PMV BLD AUTO: 10.3 FL (ref 9.2–12.9)
POCT GLUCOSE: 102 MG/DL (ref 70–110)
POCT GLUCOSE: 90 MG/DL (ref 70–110)
POTASSIUM SERPL-SCNC: 3.7 MMOL/L (ref 3.5–5.1)
PROT SERPL-MCNC: 5.8 G/DL (ref 6–8.4)
RBC # BLD AUTO: 4.12 M/UL (ref 4–5.4)
SODIUM SERPL-SCNC: 141 MMOL/L (ref 136–145)
WBC # BLD AUTO: 7.09 K/UL (ref 3.9–12.7)

## 2022-10-07 PROCEDURE — 83735 ASSAY OF MAGNESIUM: CPT | Performed by: INTERNAL MEDICINE

## 2022-10-07 PROCEDURE — 84100 ASSAY OF PHOSPHORUS: CPT | Performed by: INTERNAL MEDICINE

## 2022-10-07 PROCEDURE — 99900035 HC TECH TIME PER 15 MIN (STAT)

## 2022-10-07 PROCEDURE — 94761 N-INVAS EAR/PLS OXIMETRY MLT: CPT

## 2022-10-07 PROCEDURE — 97116 GAIT TRAINING THERAPY: CPT | Mod: CQ

## 2022-10-07 PROCEDURE — 80053 COMPREHEN METABOLIC PANEL: CPT | Performed by: INTERNAL MEDICINE

## 2022-10-07 PROCEDURE — A4216 STERILE WATER/SALINE, 10 ML: HCPCS | Performed by: INTERNAL MEDICINE

## 2022-10-07 PROCEDURE — 25000003 PHARM REV CODE 250: Performed by: INTERNAL MEDICINE

## 2022-10-07 PROCEDURE — 85025 COMPLETE CBC W/AUTO DIFF WBC: CPT | Performed by: INTERNAL MEDICINE

## 2022-10-07 PROCEDURE — 36415 COLL VENOUS BLD VENIPUNCTURE: CPT | Performed by: INTERNAL MEDICINE

## 2022-10-07 RX ADMIN — ISOSORBIDE MONONITRATE 120 MG: 60 TABLET, EXTENDED RELEASE ORAL at 09:10

## 2022-10-07 RX ADMIN — HYDRALAZINE HYDROCHLORIDE 100 MG: 25 TABLET, FILM COATED ORAL at 04:10

## 2022-10-07 RX ADMIN — APIXABAN 5 MG: 2.5 TABLET, FILM COATED ORAL at 09:10

## 2022-10-07 RX ADMIN — HYDRALAZINE HYDROCHLORIDE 100 MG: 25 TABLET, FILM COATED ORAL at 06:10

## 2022-10-07 RX ADMIN — FLUOXETINE 20 MG: 20 CAPSULE ORAL at 09:10

## 2022-10-07 RX ADMIN — Medication 10 ML: at 02:10

## 2022-10-07 RX ADMIN — FAMOTIDINE 20 MG: 20 TABLET, FILM COATED ORAL at 09:10

## 2022-10-07 RX ADMIN — Medication 10 ML: at 06:10

## 2022-10-07 RX ADMIN — FUROSEMIDE 40 MG: 40 TABLET ORAL at 09:10

## 2022-10-07 RX ADMIN — OXYCODONE HYDROCHLORIDE AND ACETAMINOPHEN 1 TABLET: 5; 325 TABLET ORAL at 10:10

## 2022-10-07 RX ADMIN — ARIPIPRAZOLE 5 MG: 5 TABLET ORAL at 09:10

## 2022-10-07 RX ADMIN — AMLODIPINE BESYLATE 10 MG: 5 TABLET ORAL at 09:10

## 2022-10-07 RX ADMIN — LEVETIRACETAM 500 MG: 500 TABLET, FILM COATED ORAL at 09:10

## 2022-10-07 RX ADMIN — CARVEDILOL 25 MG: 25 TABLET, FILM COATED ORAL at 09:10

## 2022-10-07 NOTE — PT/OT/SLP PROGRESS
Physical Therapy Treatment    Patient Name:  Tabby Howard   MRN:  [id number]    Recommendations:     Discharge Recommendations:  home health PT   Discharge Equipment Recommendations: walker, rolling   Barriers to discharge: None    Assessment:     Tabby Howard is a 32 y.o. female admitted with a medical diagnosis of Generalized abdominal pain.  She presents with the following impairments/functional limitations:  weakness, impaired endurance, decreased safety awareness, pain, impaired cognition.  Pt found sidelying, requiring mod-max encouragement and tactile cueing to initiate transfer to sit EOB for participation with PT. Ambulated 250' with RW and SB-CGA, with onset of crying sounds in second half of gait trial. Pt c/o 10/10 abdominal pain with no hunched or guarded posture. Declined toilet transfer, ADLs at sink, or up in chair.   Pt states does know location of personal RW at home and will plan to use following discharge.   Was left with HOB elevated, bed alarm active, and student nurse present.     Rehab Prognosis: Good; patient would benefit from acute skilled PT services to address these deficits and reach maximum level of function.    Recent Surgery: * No surgery found *      Plan:     During this hospitalization, patient to be seen 6 x/week to address the identified rehab impairments via gait training, therapeutic activities, therapeutic exercises and progress toward the following goals:    Plan of Care Expires:  10/30/22    Subjective     Chief Complaint: abdominal pain  Patient/Family Comments/goals: none expressed  Pain/Comfort:  Pain Rating 1: 10/10  Location - Orientation 1: generalized  Location 1: abdomen  Pain Addressed 1: Distraction, Cessation of Activity, Nurse notified      Objective:     Communicated with nurse Noriega prior to session.  Patient found left sidelying with bed alarm, peripheral IV upon PT entry to room.     General Precautions: Standard, fall   Orthopedic Precautions:N/A   Braces:  N/A  Respiratory Status: Room air     Functional Mobility:  Bed Mobility:     Supine to Sit: modified independence  Sit to Supine: modified independence  Transfers:     Sit to Stand:  stand by assistance with rolling walker  Gait: 250' RW SB-CGA, c/o abdominal pain and making crying sounds in second half of trial       AM-PAC 6 CLICK MOBILITY          Therapeutic Activities and Exercises:      Patient left HOB elevated with all lines intact, call button in reach, bed alarm on, nurse Leann notified, and student nurse present..    GOALS:   Multidisciplinary Problems       Physical Therapy Goals          Problem: Physical Therapy    Goal Priority Disciplines Outcome Goal Variances Interventions   Physical Therapy Goal     PT, PT/OT Ongoing, Progressing     Description: Goals to be met by: 10-     Patient will increase functional independence with mobility by performin. Supine to sit with Modified [place]  2. Sit to stand transfer with Contact Guard Assistance  3. Bed to chair transfer with Contact Guard Assistance using Rolling Walker  4. Gait  x 250 feet with Minimal Assistance using Rolling Walker.   5. Lower extremity exercise program x20 reps                        Time Tracking:     PT Received On: 10/07/22  PT Start Time: 935     PT Stop Time: 948  PT Total Time (min): 13 min     Billable Minutes: Gait Training 13    Treatment Type: Treatment  PT/PTA: PTA     PTA Visit Number: 2     10/07/2022   Bilateral Helical Rim Advancement Flap Text: The defect edges were debeveled with a #15 blade scalpel.  Given the location of the defect and the proximity to free margins (helical rim) a bilateral helical rim advancement flap was deemed most appropriate.  Using a sterile surgical marker, the appropriate advancement flaps were drawn incorporating the defect and placing the expected incisions between the helical rim and antihelix where possible.  The area thus outlined was incised through and through with a #15 scalpel blade.  With a skin hook and iris scissors, the flaps were gently and sharply undermined and freed up.

## 2022-10-07 NOTE — TELEPHONE ENCOUNTER
----- Message from Brittany Estrella RN sent at 10/7/2022  8:29 AM CDT -----  Regarding: hospital follow up  Good morning   This pt is discharging today and needs a hospital follow up scheduled. Dr. Holt saw while admitted. Please contact pt for scheduling      Thank you!

## 2022-10-07 NOTE — PLAN OF CARE
Pt clear for DC from case management standpoint. Discharging to home    Medicaid transport scheduled     10/07/22 1155   Final Note   Assessment Type Final Discharge Note   Anticipated Discharge Disposition Home   Hospital Resources/Appts/Education Provided Appointments scheduled and added to AVS

## 2022-10-07 NOTE — PLAN OF CARE
Problem: Physical Therapy  Goal: Physical Therapy Goal  Description: Goals to be met by: 10-     Patient will increase functional independence with mobility by performin. Supine to sit with Modified [place]  2. Sit to stand transfer with Contact Guard Assistance  3. Bed to chair transfer with Contact Guard Assistance using Rolling Walker  4. Gait  x 250 feet with Minimal Assistance using Rolling Walker.   5. Lower extremity exercise program x20 reps   Outcome: Ongoing, Progressing     Pt ambulated 250' with RW support and c/o abdominal pain in second half of gait trial. Will continue to progress patient toward physical therapy goals.

## 2022-10-07 NOTE — PLAN OF CARE
10/06/22 1952   Patient Assessment/Suction   Level of Consciousness (AVPU) alert   Respiratory Effort Normal   Rhythm/Pattern, Respiratory pattern regular   PRE-TX-O2   O2 Device (Oxygen Therapy) room air   SpO2 96 %   Pulse Oximetry Type Intermittent   Aerosol Therapy   $ Aerosol Therapy Charges PRN treatment not required

## 2022-10-07 NOTE — PLAN OF CARE
In basket message sent to Dr. Holt's staff requesting office to contact pt for scheduling hospital follow apt

## 2022-10-07 NOTE — PLAN OF CARE
Pt not eligible for RW due to receiving one in June of this year       10/07/22 0832   Post-Acute Status   Post-Acute Authorization HME   HME Status   (DENIED)

## 2022-10-07 NOTE — DISCHARGE SUMMARY
[facility] Medicine  Discharge Summary      Patient Name: Tabby Howard  MRN: [id number]  Patient Class: IP- Inpatient  Admission Date: 10/4/2022  Hospital Length of Stay: 3 days  Discharge Date and Time:  10/07/2022 8:33 AM  Attending Physician: Tosin Roberts MD   Discharging Provider: Tosin Roberts MD  Primary Care Provider: [facility]      HPI:   Patient is a 32-year-old  female with past medical history significant for end-stage renal disease (on hemodialysis Tuesday/Thursday/Saturday), hypertension, uncontrolled diabetes mellitus type 2, seizure disorder, gastroparesis, history of deep venous thrombosis and secondary parathyroidism who is being admitted to Hospital Medicine under inpatient status from [facility] Emergency room with complaints of diffuse abdominal pain associated with nausea and vomiting for 1-2 days.  Patient reports compliance with hemodialysis therapy and was expected to undergo hemodialysis earlier this morning.  It reportedly patient's father dropped her off to the emergency room.  Patient is complaining of diffuse abdominal pain for which patient received intravenous morphine.  Patient also reports associated nausea and 1 episode of emesis.  Patient denies any hematemesis, hemoptysis, melena or bleeding per rectum.  No recent fevers or chills reported.  Denies any urinary complaints.  Upon arrival, patient noted to have elevated blood pressure of 195/111 mmHg.  Dr. Figueroa from Nephrology is arranging emergent hemodialysis treatment.            * No surgery found *      Hospital Course:   Patient was admitted to medicine telemetry service.  Nephrology team was consulted.  Patient underwent emergent hemodialysis with significant volume removal.  Patient has required back-to-back 3 consecutive hemodialysis treatments.  During hospital stay patient continued to complain of persisting abdominal  pain associated with nausea and vomiting suggestive of severe gastroparesis.  GI specialist is consulted.  Patient also required 2 units of packed red blood cells for symptomatic anemia during hospital stay.   and  worked closely with the patient and making sure, patient to receive outpatient resources including NP at home and outpatient social work monitoring plan.  Patient has been cleared for discharge from nephrologist.  Patient to continue routine hemodialysis therapy as before.       Goals of Care Treatment Preferences:  Code Status: Full Code    Health care agent: Step-Mother Tanya Howard / Father Garcia Howard  Health care agent number: [phone] / [phone]          What is most important right now is to focus on remaining as independent as possible.  Accordingly, we have decided that the best plan to meet the patient's goals includes continuing with treatment.      Consults:   Consults (From admission, onward)          Status Ordering Provider     Inpatient consult to Gastroenterology  Once        Provider:  Renata Holt MD    Completed SULTAN, AQIB     Case Management/  Once        Provider:  (Not yet assigned)    Completed SULTAN, AQIB     Inpatient consult to Nephrology  Once        Provider:  Hugh Figueroa MD    Acknowledged SULTAN, AQIB     Inpatient consult to Registered Dietitian/Nutritionist  Once        Provider:  (Not yet assigned)    Completed SULTAN, AQIB          CT abdomen and pelvis without contrast:  Diffuse anasarca.  Small right pleural effusion.  Moderate pericardial effusion.  Small amount of ascites adjacent to the liver, spleen and in the pelvis.  Prior cholecystectomy.  Mild diverticulosis coli.  Contrast:      CXR: Mild CHF, slightly improved from prior.     KUB: No acute findings.  Findings as detailed above.    Final Active Diagnoses:    Diagnosis Date Noted POA    PRINCIPAL PROBLEM:  Generalized abdominal pain [R10.84]  "09/15/2018 Yes    Symptomatic anemia [D64.9] 10/06/2022 Yes    Malnutrition of moderate degree [E44.0] 10/05/2022 Yes    Volume overload [E87.70] 10/04/2022 Yes    HTN (hypertension) [I10] 07/30/2022 Yes    Deep vein thrombosis (DVT) of non-extremity vein [I82.90] 07/26/2022 Yes    ESRD (end stage renal disease) [N18.6] 07/22/2022 Yes    Seizure [R56.9] 05/29/2022 Yes    Diabetes mellitus due to underlying condition with unspecified complications [E08.8] 04/12/2022 Yes    Gastroparesis [K31.84] 04/12/2022 Yes      Problems Resolved During this Admission:       Discharged Condition: good    Disposition: Home or Self Care    Follow Up:   Follow-up Information       [facility] Follow up.    Contact information:  [address] [facility] [address]  [phone]               Renata Holt MD Follow up.    Specialties: Gastroenterology, Internal Medicine  Contact information:  [address] [facility]  [address]  [place] [address]  [phone]                           Patient Instructions:      WALKER FOR HOME USE     Order Specific Question Answer Comments   Type of Walker: Adult (5'4"-6'6")    With wheels? Yes    Height: 5' 2" (1.575 m)    Weight: 68 kg (149 lb 14.6 oz)    Length of need (1-99 months): 99    Does patient have medical equipment at home? none    Please check all that apply: Patient's condition impairs ambulation.      CBC W/ AUTO DIFFERENTIAL   Standing Status: Future Standing Exp. Date: 12/06/23     Diet Cardiac     Diet renal   Order Comments: Pureed diet preferably, take 4-5 meals (low-fat and low-fiber) daily     Diet diabetic     Notify your health care provider if you experience any of the following:  temperature >100.4     Notify your health care provider if you experience any of the following:  persistent nausea and vomiting or diarrhea     Notify your health care provider if you experience any of the following:  severe uncontrolled pain     Notify your health " care provider if you experience any of the following:  persistent dizziness, light-headedness, or visual disturbances     Notify your health care provider if you experience any of the following:  increased confusion or weakness     Activity as tolerated   Order Comments: Fall precautions, use walker for ambulation.       Significant Diagnostic Studies: Labs:   CMP   Recent Labs   Lab 10/06/22  0429 10/07/22  0428    141   K 3.6 3.7    110   CO2 26 21*   * 96   BUN 17 10   CREATININE 2.9* 2.2*   CALCIUM 8.3* 8.9   PROT 5.2* 5.8*   ALBUMIN 2.2* 2.4*   BILITOT 0.9 1.1*   ALKPHOS 169* 179*   AST 15 15   ALT 15 15   ANIONGAP 11 10   , CBC   Recent Labs   Lab 10/06/22  0429 10/07/22  0429   WBC 5.44 7.09   HGB 6.4* 12.4   HCT 19.6* 34.7*    161    and INR   Lab Results   Component Value Date    INR 1.1 08/23/2022    INR 1.0 07/28/2022    INR 1.0 03/03/2020       Pending Diagnostic Studies:       None           Medications:  Reconciled Home Medications:      Medication List        START taking these medications      cloNIDine 0.2 mg/24 hr td ptwk 0.2 mg/24 hr  Commonly known as: CATAPRES  Place 1 patch onto the skin every 7 days.     furosemide 40 MG tablet  Commonly known as: LASIX  Take 1 tablet (40 mg total) by mouth 2 (two) times daily.     polyethylene glycol 17 gram/dose powder  Commonly known as: GLYCOLAX  Take 17 g by mouth once daily.            CONTINUE taking these medications      albuterol 90 mcg/actuation inhaler  Commonly known as: PROVENTIL/VENTOLIN HFA  Inhale 2 puffs into the lungs every 6 (six) hours as needed for Wheezing. Rescue     amLODIPine 10 MG tablet  Commonly known as: NORVASC  Take 1 tablet (10 mg total) by mouth once daily.     apixaban 5 mg Tab  Commonly known as: ELIQUIS  Take 1 tablet (5 mg total) by mouth 2 (two) times daily. For second month on.     ARIPiprazole 5 MG Tab  Commonly known as: ABILIFY  Take 5 mg by mouth once daily.     carvediloL 25 MG  tablet  Commonly known as: COREG  Take 1 tablet (25 mg total) by mouth 2 (two) times daily.     desvenlafaxine succinate 50 MG Tb24  Commonly known as: PRISTIQ  Take 50 mg by mouth every other day.     famotidine 20 MG tablet  Commonly known as: PEPCID  Take 1 tablet (20 mg total) by mouth once daily.     FLUoxetine 20 MG capsule  Take 1 capsule (20 mg total) by mouth once daily.     hydrALAZINE 100 MG tablet  Commonly known as: APRESOLINE  Take 1 tablet (100 mg total) by mouth every 8 (eight) hours.     insulin aspart U-100 100 unit/mL (3 mL) Inpn pen  Commonly known as: NovoLOG  Inject 1-10 Units into the skin before meals and at bedtime as needed (Hyperglycemia).     isosorbide mononitrate 60 MG 24 hr tablet  Commonly known as: IMDUR  Take 2 tablets (120 mg total) by mouth once daily.     LANTUS U-100 INSULIN 100 unit/mL injection  Generic drug: insulin glargine  Inject 5 Units into the skin once daily.     levETIRAcetam 500 MG Tab  Commonly known as: KEPPRA  Take 1 tablet (500 mg total) by mouth 2 (two) times daily.     ondansetron 4 MG Tbdl  Commonly known as: ZOFRAN-ODT  Take 1 tablet (4 mg total) by mouth every 8 (eight) hours as needed (nausea, vomiting).     pantoprazole 40 MG tablet  Commonly known as: PROTONIX  Take 1 tablet (40 mg total) by mouth once daily.            STOP taking these medications      bisacodyL 5 mg EC tablet  Commonly known as: DULCOLAX            ASK your doctor about these medications      oxyCODONE-acetaminophen 5-325 mg per tablet  Commonly known as: PERCOCET  Take 1 tablet by mouth every 6 (six) hours as needed for Pain.              Indwelling Lines/Drains at time of discharge:   Lines/Drains/Airways       Central Venous Catheter Line  Duration                  Hemodialysis Catheter 07/26/22 1457 right internal jugular 72 days                    Time spent on the discharge of patient: 33 minutes         Tosin Roberts MD  Department of Hospital Medicine  [facility]

## 2022-10-07 NOTE — CONSULTS
INPATIENT NEPHROLOGY CONSULT   [facility] NEPHROLOGY    Tabby Howard  10/07/2022    Reason for consultation:  esrd    Chief Complaint:   Chief Complaint   Patient presents with    Abdominal Pain     With NV that started yesterday. Did not got to dialysis today went Saturday          History of Present Illness:    Pt was dropped off by her dad because he didn't have time to take her to dialysis.  She has a h/o ESRD, hypertension, diabetes, anemia, secondary hyperparathyroidism, SVT, DVT, Cholecystectomy,  and GERD.  She is tearful, complaining of abdominal pain, sob, fatigue.  C/o nausea and vomiting as well.  No angina.  No new neuro symptoms.   Good history hard to obtain given her emotional state    10/5  net UF 2.5 liters.  Less less sob.  C/o abd pain.  Nausea  10/6  no distress.  Sleeping   AFVSS  10/7  sleeping.  AFVSS    Plan of Care:       Assessment:    esrd  --continue dialysis per routine  --fluid restrict  --renal dose medication per routine  --continue outpt medication  --continue binders with meals    Anemia  --transfuse with hd today  Goal hg in esrd patient on hemodialysis is 10-12  --erythropoiesis stimulating agent with renal replacement therapy      Hypervolemia  --ultrafilter as tolerated  --supplemental uf performed 10/4  --fluid restrict   --low salt diet    Hypertension  --better after ultrafiltration  --low salt diet  --fluid restrict  --continue outpatient bp medication     H/O hyperphosphatemia  --at goal  --continue Sevelamer   --renal diet    Thank you for allowing us to participate in this patient's care. We will continue to follow.    Vital Signs:  Temp Readings from Last 3 Encounters:   10/07/22 96.6 °F (35.9 °C)   09/03/22 98.2 °F (36.8 °C) (Oral)   09/02/22 98.8 °F (37.1 °C) (Oral)       Pulse Readings from Last 3 Encounters:   10/07/22 76   09/03/22 84   09/02/22 95       BP Readings from Last 3 Encounters:   10/07/22 115/65   09/03/22 (!) 161/86   09/02/22 (!) 156/94        Weight:  Wt Readings from Last 3 Encounters:   10/06/22 68 kg (149 lb 14.6 oz)   22 70 kg (154 lb 5.2 oz)   22 74.8 kg (165 lb)       Past Medical & Surgical History:  Past Medical History:   Diagnosis Date    CKD (chronic kidney disease), stage IV 2022    Diabetes mellitus due to underlying condition with unspecified complications 2022    Gastroparesis 2022    Heart failure with preserved ejection fraction 2022    EF 55% on 3/22    History of gastroesophageal reflux (GERD)     History of supraventricular tachycardia     Hyperkalemia 2022    Hypertensive emergency 2022    Sickle cell trait 2022    Type 2 diabetes mellitus        Past Surgical History:   Procedure Laterality Date     SECTION      x 3    COLONOSCOPY      COLONOSCOPY N/A 2022    Procedure: COLONOSCOPY;  Surgeon: Jagdeep Cedeno MD;  Location: [facility];  Service: Endoscopy;  Laterality: N/A;    ESOPHAGOGASTRODUODENOSCOPY N/A 10/18/2019    Procedure: ESOPHAGOGASTRODUODENOSCOPY (EGD);  Surgeon: Gianluca Mendez MD;  Location: [facility];  Service: Endoscopy;  Laterality: N/A;    ESOPHAGOGASTRODUODENOSCOPY N/A 2022    Procedure: EGD (ESOPHAGOGASTRODUODENOSCOPY);  Surgeon: Micky Paredes III, MD;  Location: [facility];  Service: Endoscopy;  Laterality: N/A;    LAPAROSCOPIC CHOLECYSTECTOMY N/A 2022    Procedure: CHOLECYSTECTOMY, LAPAROSCOPIC;  Surgeon: Grey Perez MD;  Location: [facility];  Service: General;  Laterality: N/A;    PLACEMENT OF DUAL-LUMEN VASCULAR CATHETER Left 2022    Procedure: INSERTION-CATHETER-JOSEPH;  Surgeon: Dionte Gan MD;  Location: [facility] OR;  Service: General;  Laterality: Left;    PLACEMENT OF DUAL-LUMEN VASCULAR CATHETER Right 2022    Procedure: INSERTION-CATHETER-Hemosplit;  Surgeon: Dionte Gan MD;  Location: [facility];  Service: General;  Laterality: Right;       Past Social History:  Social History     Socioeconomic History     Marital status:    Tobacco Use    Smoking status: Never    Smokeless tobacco: Never   Substance and Sexual Activity    Alcohol use: No    Drug use: No    Sexual activity: Not Currently     Partners: Male     Birth control/protection: I.U.D.     Social Determinants of Health     Financial Resource Strain: Unknown    Difficulty of Paying Living Expenses: Patient refused   Food Insecurity: Unknown    Worried About Running Out of Food in the Last Year: Patient refused    Ran Out of Food in the Last Year: Patient refused   Transportation Needs: Unmet Transportation Needs    Lack of Transportation (Medical): Yes    Lack of Transportation (Non-Medical): Yes   Physical Activity: Unknown    Days of Exercise per Week: Patient refused    Minutes of Exercise per Session: Patient refused   Stress: Unknown    Feeling of Stress : Patient refused   Social Connections: Unknown    Frequency of Communication with Friends and Family: Patient refused    Frequency of Social Gatherings with Friends and Family: Patient refused    Attends Shinto Services: Patient refused    Active Member of Clubs or Organizations: Patient refused    Attends Club or Organization Meetings: Patient refused    Marital Status: Patient refused   Housing Stability: Unknown    Unable to Pay for Housing in the Last Year: Patient refused    Unstable Housing in the Last Year: Patient refused       Medications:  No current facility-administered medications on file prior to encounter.     Current Outpatient Medications on File Prior to Encounter   Medication Sig Dispense Refill    albuterol (PROVENTIL/VENTOLIN HFA) 90 mcg/actuation inhaler Inhale 2 puffs into the lungs every 6 (six) hours as needed for Wheezing. Rescue      amLODIPine (NORVASC) 10 MG tablet Take 1 tablet (10 mg total) by mouth once daily. 30 tablet 11    apixaban (ELIQUIS) 5 mg Tab Take 1 tablet (5 mg total) by mouth 2 (two) times daily. For second month on. 60 tablet 2    ARIPiprazole  (ABILIFY) 5 MG Tab Take 5 mg by mouth once daily.      carvediloL (COREG) 25 MG tablet Take 1 tablet (25 mg total) by mouth 2 (two) times daily. 60 tablet 2    cloNIDine 0.2 mg/24 hr td ptwk (CATAPRES) 0.2 mg/24 hr Place 1 patch onto the skin every 7 days. 4 patch 2    desvenlafaxine succinate (PRISTIQ) 50 MG Tb24 Take 50 mg by mouth every other day.      famotidine (PEPCID) 20 MG tablet Take 1 tablet (20 mg total) by mouth once daily. 30 tablet 0    FLUoxetine 20 MG capsule Take 1 capsule (20 mg total) by mouth once daily. 30 capsule 1    furosemide (LASIX) 40 MG tablet Take 1 tablet (40 mg total) by mouth 2 (two) times daily. 60 tablet 0    hydrALAZINE (APRESOLINE) 100 MG tablet Take 1 tablet (100 mg total) by mouth every 8 (eight) hours. 90 tablet 2    insulin aspart U-100 (NOVOLOG) 100 unit/mL (3 mL) InPn pen Inject 1-10 Units into the skin before meals and at bedtime as needed (Hyperglycemia). 3 mL 3    isosorbide mononitrate (IMDUR) 60 MG 24 hr tablet Take 2 tablets (120 mg total) by mouth once daily. 30 tablet 1    LANTUS U-100 INSULIN 100 unit/mL injection Inject 5 Units into the skin once daily.      levETIRAcetam (KEPPRA) 500 MG Tab Take 1 tablet (500 mg total) by mouth 2 (two) times daily. 60 tablet 1    ondansetron (ZOFRAN-ODT) 4 MG TbDL Take 1 tablet (4 mg total) by mouth every 8 (eight) hours as needed (nausea, vomiting). 12 tablet 0    oxyCODONE-acetaminophen (PERCOCET) 5-325 mg per tablet Take 1 tablet by mouth every 6 (six) hours as needed for Pain. (Patient not taking: No sig reported) 20 tablet 0    pantoprazole (PROTONIX) 40 MG tablet Take 1 tablet (40 mg total) by mouth once daily. 30 tablet 0    polyethylene glycol (GLYCOLAX) 17 gram/dose powder Take 17 g by mouth once daily. 510 g 1    [DISCONTINUED] atenoloL (TENORMIN) 50 MG tablet Take 1 tablet (50 mg total) by mouth every other day. 45 tablet 3    [DISCONTINUED] bisacodyL (DULCOLAX) 5 mg EC tablet Take 2 tablets (10 mg total) by mouth daily  "as needed for Constipation. (Patient not taking: Reported on 8/23/2022) 14 tablet 0    [DISCONTINUED] omeprazole (PRILOSEC) 20 MG capsule Take 2 capsules (40 mg total) by mouth once daily. for 10 days 20 capsule 0    [DISCONTINUED] torsemide (DEMADEX) 20 MG Tab Take 1 tablet (20 mg total) by mouth 2 (two) times a day. (Patient taking differently: Take 20 mg by mouth once daily.) 60 tablet 1     Scheduled Meds:   sodium chloride 0.9%   Intravenous Once    sodium chloride 0.9%   Intravenous Once    amLODIPine  10 mg Oral Daily    apixaban  5 mg Oral BID    ARIPiprazole  5 mg Oral Daily    carvediloL  25 mg Oral BID    cloNIDine 0.2 mg/24 hr td ptwk  1 patch Transdermal Q7 Days    epoetin teresa-epbx  6,800 Units Intravenous Every Tues, Thurs, Sat    famotidine  20 mg Oral Daily    FLUoxetine  20 mg Oral Daily    furosemide  40 mg Oral BID    hydrALAZINE  100 mg Oral Q8H    insulin detemir U-100  5 Units Subcutaneous QHS    isosorbide mononitrate  120 mg Oral Daily    levETIRAcetam  500 mg Oral BID    mupirocin   Nasal BID    sodium chloride 0.9%  10 mL Intravenous Q8H     Continuous Infusions:  PRN Meds:.    Allergies:  Penicillins    Past Family History:  Reviewed; refer to Hospitalist Admission Note    Review of Systems:  Review of Systems - All 14 systems reviewed and negative, except as noted in HPI    Physical Exam:    /65   Pulse 76   Temp 96.6 °F (35.9 °C)   Resp 18   Ht 5' 2" (1.575 m)   Wt 68 kg (149 lb 14.6 oz)   SpO2 (!) 94%   Breastfeeding No   BMI 27.42 kg/m²     General Appearance:    Tearful    Head:    Normocephalic, without obvious abnormality, atraumatic   Eyes:    PER, conjunctiva/corneas clear, EOM's intact in both eyes        Throat:   Lips, mucosa, and tongue normal; teeth and gums normal   Back:     Symmetric, no curvature, ROM normal, no CVA tenderness   Lungs:     diminished   Chest wall:    No tenderness or deformity   Heart:    Regular rate and rhythm, S1 and S2 normal, no " murmur, rub   or gallop   Abdomen:     Tender to palpation.  Can be distracted   Extremities:   edema   Pulses:   2+ and symmetric all extremities   MSK:   No joint or muscle swelling, tenderness or deformity   Skin:   Skin color, texture, turgor normal, no rashes or lesions   Neurologic:    .  No flap     Results:  Lab Results   Component Value Date     10/07/2022    K 3.7 10/07/2022     10/07/2022    CO2 21 (L) 10/07/2022    BUN 10 10/07/2022    CREATININE 2.2 (H) 10/07/2022    CALCIUM 8.9 10/07/2022    ANIONGAP 10 10/07/2022    ESTGFRAFRICA 20 (A) 07/31/2022    EGFRNONAA 18 (A) 07/31/2022       Lab Results   Component Value Date    CALCIUM 8.9 10/07/2022    PHOS 3.1 10/07/2022       Recent Labs   Lab 10/07/22  0429   WBC 7.09   RBC 4.12   HGB 12.4   HCT 34.7*      MCV 84   MCH 30.1   MCHC 35.7          Imaging Results              CT Abdomen Pelvis  Without Contrast (Final result)  Result time 10/04/22 11:00:32      Final result by Gianluca Arriaga Jr., MD (10/04/22 11:00:32)                   Impression:      Diffuse anasarca.  Small right pleural effusion.  Moderate pericardial effusion.  Small amount of ascites adjacent to the liver, spleen and in the pelvis.    Prior cholecystectomy.  Mild diverticulosis coli.      Electronically signed by: Gianluca Arriaga MD  Date:    10/04/2022  Time:    11:00               Narrative:    EXAMINATION:  CT ABDOMEN PELVIS WITHOUT CONTRAST    CLINICAL HISTORY:  Nausea/vomiting;    TECHNIQUE:  Low dose axial images, sagittal and coronal reformations were obtained from the lung bases to the pubic symphysis.  30 mL of oral Omnipaque 350 was administered.    COMPARISON:  Chest x-ray of October 4, 2022    FINDINGS:  There is diffuse anasarca throughout the chest abdomen and pelvis.  There is a small right pleural effusion moderate pericardial effusion noted.  Small amount of ascites is seen adjacent to the liver, spleen and in the pelvis.    The liver is of  normal size and CT density.  The gallbladder is absent with surgical clips noted.  The visualized pancreas appears within normal limits.  The spleen is of normal size and CT density.    The adrenal glands are not enlarged.  The kidneys are of normal size and CT density for a noncontrast study.  Stone or hydronephrosis is not seen.  The abdominal aorta and inferior vena cava are of normal caliber.    The stomach is of normal configuration.  Small bowel dilatation or air-fluid levels are not seen.  The colon is of normal configuration with few diverticuli of the sigmoid colon without CT evidence of diverticulitis.    Bladder is of normal contour without mass or asymmetry.  The uterus is within normal limits.                                       X-Ray Chest AP Portable (Final result)  Result time 10/04/22 09:42:05      Final result by Kaushik Gutierrez MD (10/04/22 09:42:05)                   Impression:      Mild CHF, slightly improved from prior.      Electronically signed by: Kaushik Gutierrez  Date:    10/04/2022  Time:    09:42               Narrative:    EXAMINATION:  XR CHEST AP PORTABLE    CLINICAL HISTORY:  hyperglycemia;    TECHNIQUE:  Single frontal view of the chest was performed.    COMPARISON:  09/02/2022    FINDINGS:  Bibasilar interstitial disease.  Enlarged cardiac silhouette.  Right-sided central line with tip projecting over the right heart border presumably in the right atrium.  No pleural effusion or pneumothorax.                                        I have personally reviewed pertinent radiological imaging and reports.    Patient care was time spent personally by me on the following activities:   Obtaining a history  Examination of patient.  Providing medical care at the patients bedside.  Developing a treatment plan with patient or surrogate and bedside caregivers  Ordering and reviewing laboratory studies, radiographic studies, pulse oximetry.  Ordering and performing treatments and  interventions.  Evaluation of patient's response to treatment.  Discussions with consultants while on the unit and immediately available to the patient.  Re-evaluation of the patient's condition.  Documentation in the medical record.     Hugh Figueroa MD  Nephrology  [facility]  [phone]

## 2022-10-07 NOTE — PLAN OF CARE
Problem: Adult Inpatient Plan of Care  Goal: Plan of Care Review  Outcome: Ongoing, Progressing  Goal: Patient-Specific Goal (Individualized)  Outcome: Ongoing, Progressing     Problem: Hemodynamic Instability (Hemodialysis)  Goal: Effective Tissue Perfusion  Outcome: Ongoing, Progressing     Problem: Diabetes Comorbidity  Goal: Blood Glucose Level Within Targeted Range  Outcome: Ongoing, Progressing     Problem: Fluid and Electrolyte Imbalance (Acute Kidney Injury/Impairment)  Goal: Fluid and Electrolyte Balance  Outcome: Ongoing, Progressing     Problem: Renal Function Impairment (Acute Kidney Injury/Impairment)  Goal: Effective Renal Function  Outcome: Ongoing, Progressing

## 2022-10-07 NOTE — TELEPHONE ENCOUNTER
Call placed to Ms. Lee in regards to message received. No answer, and unable to leave message due to mailbox being full.      **Pt needs to call [facility] to make a 2 week f/u per her discharge paperwork. At this time we are not accepting any new pts with medicaid.

## 2022-10-07 NOTE — PLAN OF CARE
Medicaid transportation booked for 2:30 PM  time. Rep states they have 3 hr window to book vendor- rep did make note of pt being ready for  now. [phone] trip ID# [id number].

## 2022-10-07 NOTE — SUBJECTIVE & OBJECTIVE
Interval History:  No new events noted overnight except for ongoing complaints of abdominal pain and nausea.  This morning looking somewhat better than previously.  Hemoglobin drop down to 6.4 grams/deciliter.  No obvious blood loss such as melena, bleeding per rectum or hematuria reported.  Patient is scheduled for back-to-back 3rd round of hemodialysis with which patient will received 2 units of packed red blood cells.  Awaiting GI specialist evaluation for underlying gastroparesis.  Currently afebrile.    Review of Systems   Constitutional:  Positive for appetite change and fatigue.   Gastrointestinal:  Positive for abdominal pain, nausea and vomiting.   Musculoskeletal:  Positive for back pain.   Neurological:  Positive for weakness.   All other systems reviewed and are negative.  Objective:     Vital Signs (Most Recent):  Temp: 97 °F (36.1 °C) (10/07/22 0403)  Pulse: 76 (10/07/22 0403)  Resp: 17 (10/07/22 0403)  BP: (!) 141/95 (10/07/22 0403)  SpO2: 98 % (10/07/22 0403) Vital Signs (24h Range):  Temp:  [96.7 °F (35.9 °C)-98 °F (36.7 °C)] 97 °F (36.1 °C)  Pulse:  [71-80] 76  Resp:  [13-18] 17  SpO2:  [93 %-98 %] 98 %  BP: (107-148)/(54-95) 141/95     Weight: 68 kg (149 lb 14.6 oz)  Body mass index is 27.42 kg/m².    Intake/Output Summary (Last 24 hours) at 10/7/2022 0819  Last data filed at 10/7/2022 0028  Gross per 24 hour   Intake 2396 ml   Output 4800 ml   Net -2404 ml        Physical Exam  Constitutional:       Appearance: She is well-developed.   HENT:      Head: Normocephalic and atraumatic.   Eyes:      Conjunctiva/sclera: Conjunctivae normal.      Pupils: Pupils are equal, round, and reactive to light.   Neck:      Thyroid: No thyromegaly.      Vascular: No JVD.   Cardiovascular:      Rate and Rhythm: Normal rate and regular rhythm.      Heart sounds: No murmur heard.    No friction rub. No gallop.   Pulmonary:      Effort: Pulmonary effort is normal.      Breath sounds: Normal breath sounds.    Abdominal:      General: Bowel sounds are normal. There is no distension.      Palpations: Abdomen is soft. There is no mass.      Tenderness: There is no abdominal tenderness.   Musculoskeletal:         General: Normal range of motion.      Cervical back: Neck supple.      Comments: 1+ pitting edema bilateral lower extremities, Homans sign negative bilaterally.   Skin:     General: Skin is warm and dry.   Neurological:      Mental Status: She is oriented to person, place, and time.      Cranial Nerves: No cranial nerve deficit.   Psychiatric:         Behavior: Behavior normal.       Significant Labs: All pertinent labs within the past 24 hours have been reviewed.  CBC:   Recent Labs   Lab 10/06/22  0429 10/07/22  0429   WBC 5.44 7.09   HGB 6.4* 12.4   HCT 19.6* 34.7*    161       CMP:   Recent Labs   Lab 10/06/22  0429 10/07/22  0428    141   K 3.6 3.7    110   CO2 26 21*   * 96   BUN 17 10   CREATININE 2.9* 2.2*   CALCIUM 8.3* 8.9   PROT 5.2* 5.8*   ALBUMIN 2.2* 2.4*   BILITOT 0.9 1.1*   ALKPHOS 169* 179*   AST 15 15   ALT 15 15   ANIONGAP 11 10       Coagulation: No results for input(s): PT, INR, APTT in the last 48 hours.  Lactic Acid:   Recent Labs   Lab 10/06/22  1617   LACTATE 0.7     Lipase:   No results for input(s): LIPASE in the last 48 hours.    Microbiology Results (last 7 days)       ** No results found for the last 168 hours. **          Significant Imaging:   CT abdomen and pelvis without contrast:  Diffuse anasarca.  Small right pleural effusion.  Moderate pericardial effusion.  Small amount of ascites adjacent to the liver, spleen and in the pelvis.  Prior cholecystectomy.  Mild diverticulosis coli.  Contrast:      CXR: Mild CHF, slightly improved from prior.    KUB: No acute findings.  Findings as detailed above.

## 2022-10-07 NOTE — CHAPLAIN
Visited pt again, second time this admit and again she was soundly asleep-- did not awake upon my knocking; familiar with pt from previous admits and know that she likes prayer, so I gently laid my hand upon her as she slept and whispered a prayer over her. Lord, in your mercy.

## 2022-10-09 NOTE — Clinical Note
Diagnosis: Chest pain, unspecified type [[id number]]   Future Attending Provider: ELIANA ROWLEY [[id number]]   Admitting Provider:: ELIANA ROWLEY [[id number]]   Patient here for meningitis and flu vaccine. VIS provided for all immunizations, consent given and questions answered. Patient tolerated procedure well with no adverse effects.

## 2022-10-10 ENCOUNTER — PES CALL (OUTPATIENT)
Dept: ADMINISTRATIVE | Facility: CLINIC | Age: 32
End: 2022-10-10
Payer: MEDICAID

## 2022-10-11 ENCOUNTER — PATIENT MESSAGE (OUTPATIENT)
Dept: ADMINISTRATIVE | Facility: CLINIC | Age: 32
End: 2022-10-11
Payer: MEDICAID

## 2022-10-11 ENCOUNTER — PATIENT OUTREACH (OUTPATIENT)
Dept: ADMINISTRATIVE | Facility: CLINIC | Age: 32
End: 2022-10-11
Payer: MEDICAID

## 2022-10-11 ENCOUNTER — PES CALL (OUTPATIENT)
Dept: ADMINISTRATIVE | Facility: CLINIC | Age: 32
End: 2022-10-11
Payer: MEDICAID

## 2022-10-12 ENCOUNTER — PES CALL (OUTPATIENT)
Dept: ADMINISTRATIVE | Facility: CLINIC | Age: 32
End: 2022-10-12
Payer: MEDICAID

## 2022-10-16 ENCOUNTER — HOSPITAL ENCOUNTER (OUTPATIENT)
Facility: HOSPITAL | Age: 32
Discharge: HOME | End: 2022-10-18
Attending: EMERGENCY MEDICINE | Admitting: INTERNAL MEDICINE
Payer: MEDICAID

## 2022-10-16 DIAGNOSIS — R52 INTRACTABLE PAIN: Primary | ICD-10-CM

## 2022-10-16 DIAGNOSIS — N18.6 ESRD NEEDING DIALYSIS: ICD-10-CM

## 2022-10-16 DIAGNOSIS — R10.9 ABDOMINAL PAIN: ICD-10-CM

## 2022-10-16 DIAGNOSIS — Z99.2 ESRD NEEDING DIALYSIS: ICD-10-CM

## 2022-10-16 DIAGNOSIS — R10.9 ABDOMINAL PAIN, UNSPECIFIED ABDOMINAL LOCATION: ICD-10-CM

## 2022-10-16 DIAGNOSIS — N17.9 AKI (ACUTE KIDNEY INJURY): ICD-10-CM

## 2022-10-16 DIAGNOSIS — A49.8 CLOSTRIDIUM DIFFICILE INFECTION: ICD-10-CM

## 2022-10-16 LAB
ALBUMIN SERPL BCP-MCNC: 2.7 G/DL (ref 3.5–5.2)
ALLENS TEST: ABNORMAL
ALP SERPL-CCNC: 135 U/L (ref 55–135)
ALT SERPL W/O P-5'-P-CCNC: 16 U/L (ref 10–44)
ANION GAP SERPL CALC-SCNC: 7 MMOL/L (ref 8–16)
AST SERPL-CCNC: 15 U/L (ref 10–40)
B-HCG UR QL: NEGATIVE
B-OH-BUTYR BLD STRIP-SCNC: 0.3 MMOL/L (ref 0–0.5)
BACTERIA #/AREA URNS HPF: NEGATIVE /HPF
BASOPHILS # BLD AUTO: 0.03 K/UL (ref 0–0.2)
BASOPHILS NFR BLD: 0.6 % (ref 0–1.9)
BILIRUB SERPL-MCNC: 1.5 MG/DL (ref 0.1–1)
BILIRUB UR QL STRIP: NEGATIVE
BUN SERPL-MCNC: 82 MG/DL (ref 6–20)
CALCIUM SERPL-MCNC: 8.5 MG/DL (ref 8.7–10.5)
CHLORIDE SERPL-SCNC: 101 MMOL/L (ref 95–110)
CLARITY UR: ABNORMAL
CO2 SERPL-SCNC: 23 MMOL/L (ref 23–29)
COLOR UR: YELLOW
CREAT SERPL-MCNC: 6.5 MG/DL (ref 0.5–1.4)
CTP QC/QA: YES
DELSYS: ABNORMAL
DIFFERENTIAL METHOD: ABNORMAL
EOSINOPHIL # BLD AUTO: 0.1 K/UL (ref 0–0.5)
EOSINOPHIL NFR BLD: 1.1 % (ref 0–8)
ERYTHROCYTE [DISTWIDTH] IN BLOOD BY AUTOMATED COUNT: 13.6 % (ref 11.5–14.5)
EST. GFR  (NO RACE VARIABLE): 8.1 ML/MIN/1.73 M^2
FLOW: 2
GLUCOSE SERPL-MCNC: 467 MG/DL (ref 70–110)
GLUCOSE UR QL STRIP: ABNORMAL
HCO3 UR-SCNC: 23.2 MMOL/L (ref 24–28)
HCT VFR BLD AUTO: 30.6 % (ref 37–48.5)
HGB BLD-MCNC: 10.6 G/DL (ref 12–16)
HGB UR QL STRIP: ABNORMAL
HYALINE CASTS #/AREA URNS LPF: 7 /LPF
IMM GRANULOCYTES # BLD AUTO: 0.03 K/UL (ref 0–0.04)
IMM GRANULOCYTES NFR BLD AUTO: 0.6 % (ref 0–0.5)
KETONES UR QL STRIP: ABNORMAL
LEUKOCYTE ESTERASE UR QL STRIP: ABNORMAL
LIPASE SERPL-CCNC: 31 U/L (ref 4–60)
LYMPHOCYTES # BLD AUTO: 0.6 K/UL (ref 1–4.8)
LYMPHOCYTES NFR BLD: 12 % (ref 18–48)
MAGNESIUM SERPL-MCNC: 2.5 MG/DL (ref 1.6–2.6)
MCH RBC QN AUTO: 29.7 PG (ref 27–31)
MCHC RBC AUTO-ENTMCNC: 34.6 G/DL (ref 32–36)
MCV RBC AUTO: 86 FL (ref 82–98)
MICROSCOPIC COMMENT: ABNORMAL
MODE: ABNORMAL
MONOCYTES # BLD AUTO: 0.5 K/UL (ref 0.3–1)
MONOCYTES NFR BLD: 10.7 % (ref 4–15)
NEUTROPHILS # BLD AUTO: 3.5 K/UL (ref 1.8–7.7)
NEUTROPHILS NFR BLD: 75 % (ref 38–73)
NITRITE UR QL STRIP: NEGATIVE
NRBC BLD-RTO: 0 /100 WBC
PCO2 BLDA: 37.6 MMHG (ref 35–45)
PH SMN: 7.4 [PH] (ref 7.35–7.45)
PH UR STRIP: 7 [PH] (ref 5–8)
PLATELET # BLD AUTO: 113 K/UL (ref 150–450)
PMV BLD AUTO: 11 FL (ref 9.2–12.9)
PO2 BLDA: 55 MMHG (ref 40–60)
POC BE: -2 MMOL/L
POC SATURATED O2: 88 % (ref 95–100)
POC TCO2: 24 MMOL/L (ref 24–29)
POTASSIUM SERPL-SCNC: 5.3 MMOL/L (ref 3.5–5.1)
PROT SERPL-MCNC: 6.3 G/DL (ref 6–8.4)
PROT UR QL STRIP: ABNORMAL
RBC # BLD AUTO: 3.57 M/UL (ref 4–5.4)
RBC #/AREA URNS HPF: 12 /HPF (ref 0–4)
SAMPLE: ABNORMAL
SARS-COV-2 RDRP RESP QL NAA+PROBE: NEGATIVE
SITE: ABNORMAL
SODIUM SERPL-SCNC: 131 MMOL/L (ref 136–145)
SP GR UR STRIP: 1.02 (ref 1–1.03)
SQUAMOUS #/AREA URNS HPF: 0 /HPF
TROPONIN I SERPL DL<=0.01 NG/ML-MCNC: 0.09 NG/ML
URN SPEC COLLECT METH UR: ABNORMAL
UROBILINOGEN UR STRIP-ACNC: NEGATIVE EU/DL
WBC # BLD AUTO: 4.68 K/UL (ref 3.9–12.7)
WBC #/AREA URNS HPF: >100 /HPF (ref 0–5)
YEAST URNS QL MICRO: ABNORMAL

## 2022-10-16 PROCEDURE — 84484 ASSAY OF TROPONIN QUANT: CPT | Performed by: NURSE PRACTITIONER

## 2022-10-16 PROCEDURE — 93010 EKG 12-LEAD: ICD-10-PCS | Mod: ,,, | Performed by: INTERNAL MEDICINE

## 2022-10-16 PROCEDURE — 96376 TX/PRO/DX INJ SAME DRUG ADON: CPT | Mod: 59

## 2022-10-16 PROCEDURE — 82010 KETONE BODYS QUAN: CPT | Performed by: STUDENT IN AN ORGANIZED HEALTH CARE EDUCATION/TRAINING PROGRAM

## 2022-10-16 PROCEDURE — G0378 HOSPITAL OBSERVATION PER HR: HCPCS

## 2022-10-16 PROCEDURE — 80053 COMPREHEN METABOLIC PANEL: CPT | Performed by: STUDENT IN AN ORGANIZED HEALTH CARE EDUCATION/TRAINING PROGRAM

## 2022-10-16 PROCEDURE — 93005 ELECTROCARDIOGRAM TRACING: CPT | Performed by: INTERNAL MEDICINE

## 2022-10-16 PROCEDURE — 93010 ELECTROCARDIOGRAM REPORT: CPT | Mod: ,,, | Performed by: INTERNAL MEDICINE

## 2022-10-16 PROCEDURE — 81025 URINE PREGNANCY TEST: CPT | Performed by: STUDENT IN AN ORGANIZED HEALTH CARE EDUCATION/TRAINING PROGRAM

## 2022-10-16 PROCEDURE — 82803 BLOOD GASES ANY COMBINATION: CPT

## 2022-10-16 PROCEDURE — 63600175 PHARM REV CODE 636 W HCPCS: Performed by: NURSE PRACTITIONER

## 2022-10-16 PROCEDURE — 63600175 PHARM REV CODE 636 W HCPCS

## 2022-10-16 PROCEDURE — 63600175 PHARM REV CODE 636 W HCPCS: Performed by: STUDENT IN AN ORGANIZED HEALTH CARE EDUCATION/TRAINING PROGRAM

## 2022-10-16 PROCEDURE — 87086 URINE CULTURE/COLONY COUNT: CPT | Performed by: STUDENT IN AN ORGANIZED HEALTH CARE EDUCATION/TRAINING PROGRAM

## 2022-10-16 PROCEDURE — 25000003 PHARM REV CODE 250: Performed by: INTERNAL MEDICINE

## 2022-10-16 PROCEDURE — 25500020 PHARM REV CODE 255: Performed by: EMERGENCY MEDICINE

## 2022-10-16 PROCEDURE — 96365 THER/PROPH/DIAG IV INF INIT: CPT | Mod: 59

## 2022-10-16 PROCEDURE — 99285 EMERGENCY DEPT VISIT HI MDM: CPT | Mod: 25

## 2022-10-16 PROCEDURE — 99900035 HC TECH TIME PER 15 MIN (STAT)

## 2022-10-16 PROCEDURE — 63600175 PHARM REV CODE 636 W HCPCS: Performed by: INTERNAL MEDICINE

## 2022-10-16 PROCEDURE — 25000003 PHARM REV CODE 250: Performed by: STUDENT IN AN ORGANIZED HEALTH CARE EDUCATION/TRAINING PROGRAM

## 2022-10-16 PROCEDURE — 25000003 PHARM REV CODE 250: Performed by: NURSE PRACTITIONER

## 2022-10-16 PROCEDURE — 81001 URINALYSIS AUTO W/SCOPE: CPT | Performed by: STUDENT IN AN ORGANIZED HEALTH CARE EDUCATION/TRAINING PROGRAM

## 2022-10-16 PROCEDURE — U0002 COVID-19 LAB TEST NON-CDC: HCPCS | Performed by: STUDENT IN AN ORGANIZED HEALTH CARE EDUCATION/TRAINING PROGRAM

## 2022-10-16 PROCEDURE — 96376 TX/PRO/DX INJ SAME DRUG ADON: CPT

## 2022-10-16 PROCEDURE — 83735 ASSAY OF MAGNESIUM: CPT | Performed by: STUDENT IN AN ORGANIZED HEALTH CARE EDUCATION/TRAINING PROGRAM

## 2022-10-16 PROCEDURE — 82962 GLUCOSE BLOOD TEST: CPT

## 2022-10-16 PROCEDURE — 96375 TX/PRO/DX INJ NEW DRUG ADDON: CPT | Mod: 59

## 2022-10-16 PROCEDURE — 36600 WITHDRAWAL OF ARTERIAL BLOOD: CPT

## 2022-10-16 PROCEDURE — 96361 HYDRATE IV INFUSION ADD-ON: CPT

## 2022-10-16 PROCEDURE — 83690 ASSAY OF LIPASE: CPT | Performed by: STUDENT IN AN ORGANIZED HEALTH CARE EDUCATION/TRAINING PROGRAM

## 2022-10-16 PROCEDURE — 85025 COMPLETE CBC W/AUTO DIFF WBC: CPT | Performed by: STUDENT IN AN ORGANIZED HEALTH CARE EDUCATION/TRAINING PROGRAM

## 2022-10-16 RX ORDER — LEVETIRACETAM 500 MG/1
500 TABLET ORAL 2 TIMES DAILY
Status: DISCONTINUED | OUTPATIENT
Start: 2022-10-16 | End: 2022-10-18 | Stop reason: HOSPADM

## 2022-10-16 RX ORDER — MUPIROCIN 20 MG/G
OINTMENT TOPICAL 2 TIMES DAILY
Status: DISCONTINUED | OUTPATIENT
Start: 2022-10-16 | End: 2022-10-18 | Stop reason: HOSPADM

## 2022-10-16 RX ORDER — IODIXANOL 320 MG/ML
100 INJECTION, SOLUTION INTRAVASCULAR
Status: COMPLETED | OUTPATIENT
Start: 2022-10-16 | End: 2022-10-16

## 2022-10-16 RX ORDER — ONDANSETRON 2 MG/ML
INJECTION INTRAMUSCULAR; INTRAVENOUS
Status: COMPLETED
Start: 2022-10-16 | End: 2022-10-16

## 2022-10-16 RX ORDER — TALC
6 POWDER (GRAM) TOPICAL NIGHTLY PRN
Status: DISCONTINUED | OUTPATIENT
Start: 2022-10-16 | End: 2022-10-18 | Stop reason: HOSPADM

## 2022-10-16 RX ORDER — MORPHINE SULFATE 2 MG/ML
2 INJECTION, SOLUTION INTRAMUSCULAR; INTRAVENOUS
Status: DISCONTINUED | OUTPATIENT
Start: 2022-10-17 | End: 2022-10-18

## 2022-10-16 RX ORDER — CARVEDILOL 12.5 MG/1
25 TABLET ORAL 2 TIMES DAILY
Status: DISCONTINUED | OUTPATIENT
Start: 2022-10-16 | End: 2022-10-18 | Stop reason: HOSPADM

## 2022-10-16 RX ORDER — HYDRALAZINE HYDROCHLORIDE 25 MG/1
100 TABLET, FILM COATED ORAL EVERY 8 HOURS
Status: DISCONTINUED | OUTPATIENT
Start: 2022-10-16 | End: 2022-10-18 | Stop reason: HOSPADM

## 2022-10-16 RX ORDER — PANTOPRAZOLE SODIUM 40 MG/1
40 TABLET, DELAYED RELEASE ORAL
Status: DISCONTINUED | OUTPATIENT
Start: 2022-10-17 | End: 2022-10-18 | Stop reason: HOSPADM

## 2022-10-16 RX ORDER — MORPHINE SULFATE 4 MG/ML
4 INJECTION, SOLUTION INTRAMUSCULAR; INTRAVENOUS
Status: DISCONTINUED | OUTPATIENT
Start: 2022-10-17 | End: 2022-10-17

## 2022-10-16 RX ORDER — ONDANSETRON 2 MG/ML
4 INJECTION INTRAMUSCULAR; INTRAVENOUS
Status: COMPLETED | OUTPATIENT
Start: 2022-10-16 | End: 2022-10-16

## 2022-10-16 RX ORDER — HYDROMORPHONE HYDROCHLORIDE 1 MG/ML
0.5 INJECTION, SOLUTION INTRAMUSCULAR; INTRAVENOUS; SUBCUTANEOUS
Status: COMPLETED | OUTPATIENT
Start: 2022-10-16 | End: 2022-10-16

## 2022-10-16 RX ORDER — FUROSEMIDE 40 MG/1
40 TABLET ORAL 2 TIMES DAILY
Status: DISCONTINUED | OUTPATIENT
Start: 2022-10-16 | End: 2022-10-18 | Stop reason: HOSPADM

## 2022-10-16 RX ORDER — HYDROCODONE BITARTRATE AND ACETAMINOPHEN 5; 325 MG/1; MG/1
1 TABLET ORAL EVERY 6 HOURS PRN
Status: DISCONTINUED | OUTPATIENT
Start: 2022-10-16 | End: 2022-10-16

## 2022-10-16 RX ORDER — SODIUM CHLORIDE 9 MG/ML
INJECTION, SOLUTION INTRAVENOUS ONCE
Status: CANCELLED | OUTPATIENT
Start: 2022-10-16 | End: 2022-10-16

## 2022-10-16 RX ORDER — ACETAMINOPHEN 325 MG/1
650 TABLET ORAL EVERY 8 HOURS PRN
Status: DISCONTINUED | OUTPATIENT
Start: 2022-10-16 | End: 2022-10-18 | Stop reason: HOSPADM

## 2022-10-16 RX ORDER — AMLODIPINE BESYLATE 5 MG/1
10 TABLET ORAL DAILY
Status: DISCONTINUED | OUTPATIENT
Start: 2022-10-17 | End: 2022-10-18 | Stop reason: HOSPADM

## 2022-10-16 RX ORDER — ISOSORBIDE MONONITRATE 60 MG/1
120 TABLET, EXTENDED RELEASE ORAL DAILY
Status: DISCONTINUED | OUTPATIENT
Start: 2022-10-17 | End: 2022-10-18 | Stop reason: HOSPADM

## 2022-10-16 RX ORDER — SODIUM CHLORIDE 9 MG/ML
INJECTION, SOLUTION INTRAVENOUS
Status: CANCELLED | OUTPATIENT
Start: 2022-10-16

## 2022-10-16 RX ORDER — CLONIDINE 0.2 MG/24H
1 PATCH, EXTENDED RELEASE TRANSDERMAL
Status: DISCONTINUED | OUTPATIENT
Start: 2022-10-17 | End: 2022-10-18 | Stop reason: HOSPADM

## 2022-10-16 RX ORDER — FLUOXETINE HYDROCHLORIDE 20 MG/1
20 CAPSULE ORAL DAILY
Status: DISCONTINUED | OUTPATIENT
Start: 2022-10-17 | End: 2022-10-18 | Stop reason: HOSPADM

## 2022-10-16 RX ORDER — ONDANSETRON 2 MG/ML
4 INJECTION INTRAMUSCULAR; INTRAVENOUS EVERY 8 HOURS PRN
Status: DISCONTINUED | OUTPATIENT
Start: 2022-10-16 | End: 2022-10-18 | Stop reason: HOSPADM

## 2022-10-16 RX ORDER — SODIUM CHLORIDE 0.9 % (FLUSH) 0.9 %
10 SYRINGE (ML) INJECTION
Status: DISCONTINUED | OUTPATIENT
Start: 2022-10-16 | End: 2022-10-18 | Stop reason: HOSPADM

## 2022-10-16 RX ORDER — SODIUM CHLORIDE 9 MG/ML
1000 INJECTION, SOLUTION INTRAVENOUS
Status: COMPLETED | OUTPATIENT
Start: 2022-10-16 | End: 2022-10-16

## 2022-10-16 RX ADMIN — HYDROCODONE BITARTRATE AND ACETAMINOPHEN 1 TABLET: 5; 325 TABLET ORAL at 10:10

## 2022-10-16 RX ADMIN — APIXABAN 5 MG: 5 TABLET, FILM COATED ORAL at 10:10

## 2022-10-16 RX ADMIN — MUPIROCIN: 20 OINTMENT TOPICAL at 07:10

## 2022-10-16 RX ADMIN — ONDANSETRON 4 MG: 2 INJECTION INTRAMUSCULAR; INTRAVENOUS at 04:10

## 2022-10-16 RX ADMIN — SODIUM CHLORIDE 1000 ML: 0.9 INJECTION, SOLUTION INTRAVENOUS at 04:10

## 2022-10-16 RX ADMIN — IODIXANOL 100 ML: 320 INJECTION, SOLUTION INTRAVASCULAR at 06:10

## 2022-10-16 RX ADMIN — CEFTRIAXONE 1 G: 1 INJECTION, SOLUTION INTRAVENOUS at 07:10

## 2022-10-16 RX ADMIN — ONDANSETRON 4 MG: 2 INJECTION INTRAMUSCULAR; INTRAVENOUS at 10:10

## 2022-10-16 RX ADMIN — HYDRALAZINE HYDROCHLORIDE 100 MG: 25 TABLET ORAL at 10:10

## 2022-10-16 RX ADMIN — INSULIN HUMAN 4 UNITS: 100 INJECTION, SOLUTION PARENTERAL at 04:10

## 2022-10-16 RX ADMIN — ONDANSETRON 4 MG: 2 INJECTION INTRAMUSCULAR; INTRAVENOUS at 06:10

## 2022-10-16 RX ADMIN — CARVEDILOL 25 MG: 12.5 TABLET, FILM COATED ORAL at 10:10

## 2022-10-16 RX ADMIN — LEVETIRACETAM 500 MG: 500 TABLET, FILM COATED ORAL at 10:10

## 2022-10-16 RX ADMIN — MORPHINE SULFATE 2 MG: 2 INJECTION, SOLUTION INTRAMUSCULAR; INTRAVENOUS at 11:10

## 2022-10-16 RX ADMIN — HYDROMORPHONE HYDROCHLORIDE 0.5 MG: 0.5 INJECTION, SOLUTION INTRAMUSCULAR; INTRAVENOUS; SUBCUTANEOUS at 04:10

## 2022-10-16 RX ADMIN — FUROSEMIDE 40 MG: 40 TABLET ORAL at 10:10

## 2022-10-16 NOTE — ED NOTES
Upon PCT entering room, pt SPO2 78%. Pt woken up from sleep, SPO2 increased to 97% upon awakening. MD notified

## 2022-10-16 NOTE — ED PROVIDER NOTES
Encounter Date: 10/16/2022       History     Chief Complaint   Patient presents with    Abdominal Pain     Dialysis T/, Missed this  &  because she did not have a ride. Hx of gastroparesis but the pain is different per pt. EMS gave 2 doses of Fentanyl totaling 100mcg.      32-year-old female history of ESRD on Tuesday, Thursday, Saturday dialysis, diabetes, gastroparesis presents emergency room today for evaluation of severe abdominal pain.  Patient tells me that she missed her last 2 dialysis appointments due to difficulties obtaining a ride.  She is complaining of diffuse abdominal pain that is worse in the lower quadrants but radiating up.  This is been going on for the last 2 days.  Her nephrologist is .  This is associated with nausea without vomiting.  No chest pain or shortness of breath.    Review of patient's allergies indicates:   Allergen Reactions    Penicillins Hives     Past Medical History:   Diagnosis Date    CKD (chronic kidney disease), stage IV 2022    Diabetes mellitus due to underlying condition with unspecified complications 2022    Gastroparesis 2022    Heart failure with preserved ejection fraction 2022    EF 55% on 3/22    History of gastroesophageal reflux (GERD)     History of supraventricular tachycardia     Hyperkalemia 2022    Hypertensive emergency 2022    Sickle cell trait 2022    Type 2 diabetes mellitus      Past Surgical History:   Procedure Laterality Date     SECTION      x 3    COLONOSCOPY      COLONOSCOPY N/A 2022    Procedure: COLONOSCOPY;  Surgeon: Jagdeep Cedeno MD;  Location: [facility];  Service: Endoscopy;  Laterality: N/A;    ESOPHAGOGASTRODUODENOSCOPY N/A 10/18/2019    Procedure: ESOPHAGOGASTRODUODENOSCOPY (EGD);  Surgeon: Gianluca Mendez MD;  Location: [facility];  Service: Endoscopy;  Laterality: N/A;    ESOPHAGOGASTRODUODENOSCOPY N/A 2022    Procedure: EGD (ESOPHAGOGASTRODUODENOSCOPY);  Surgeon:  Micky Paredes III, MD;  Location: [facility] ENDO;  Service: Endoscopy;  Laterality: N/A;    LAPAROSCOPIC CHOLECYSTECTOMY N/A 07/30/2022    Procedure: CHOLECYSTECTOMY, LAPAROSCOPIC;  Surgeon: Grey Perez MD;  Location: [facility] OR;  Service: General;  Laterality: N/A;    PLACEMENT OF DUAL-LUMEN VASCULAR CATHETER Left 07/12/2022    Procedure: INSERTION-CATHETER-JOSEPH;  Surgeon: Dionte Gan MD;  Location: [facility] OR;  Service: General;  Laterality: Left;    PLACEMENT OF DUAL-LUMEN VASCULAR CATHETER Right 07/26/2022    Procedure: INSERTION-CATHETER-Hemosplit;  Surgeon: Dionte Gan MD;  Location: [facility] OR;  Service: General;  Laterality: Right;     Family History   Problem Relation Age of Onset    Diabetes Mother     Diabetes Father      Social History     Tobacco Use    Smoking status: Never    Smokeless tobacco: Never   Substance Use Topics    Alcohol use: No    Drug use: No     Review of Systems   Constitutional:  Negative for chills, fatigue and fever.   HENT:  Negative for congestion, hearing loss, sore throat and trouble swallowing.    Eyes:  Negative for visual disturbance.   Respiratory:  Negative for cough, chest tightness and shortness of breath.    Cardiovascular:  Negative for chest pain.   Gastrointestinal:  Positive for abdominal pain and nausea. Negative for vomiting.   Endocrine: Negative for polyuria.   Genitourinary:  Negative for difficulty urinating.   Musculoskeletal:  Negative for arthralgias and myalgias.   Skin:  Negative for rash.   Neurological:  Negative for dizziness and headaches.   Psychiatric/Behavioral:  The patient is not nervous/anxious.      Physical Exam     Initial Vitals [10/16/22 1340]   BP Pulse Resp Temp SpO2   (!) 151/96 90 16 97.9 °F (36.6 °C) 100 %      MAP       --         Physical Exam    Nursing note and vitals reviewed.  Constitutional: She appears well-developed and well-nourished.   Patient is flailing on the bed, holding her stomach.   HENT:   Head: Normocephalic  and atraumatic.   Eyes: Conjunctivae are normal. Pupils are equal, round, and reactive to light.   Neck: Neck supple.   Normal range of motion.  Cardiovascular:  Normal rate, regular rhythm and normal heart sounds.           Pulmonary/Chest: Breath sounds normal.   Abdominal:   Exam significant for mild right lower quadrant and suprapubic tenderness.    Otherwise abdomen is flat, soft and nontender in all other quadrants. No peritoneal signs. No suprapubic pain. No CVAT. No guarding, no palpable masses.  No hepatosplenomegaly. No overlying skin changes. Normoactive bowel sounds. No bruits. Distal pulses 2+ b/l. No inguinal lymphadenopathy.  Negative Perez's sign. Nontender at McBurney's point. No rebound tenderness, negative psoas sign. No pain with heel tap.     Musculoskeletal:         General: Normal range of motion.      Cervical back: Normal range of motion and neck supple.     Neurological: She is alert and oriented to person, place, and time. GCS score is 15. GCS eye subscore is 4. GCS verbal subscore is 5. GCS motor subscore is 6.   Skin: Skin is warm and dry. Capillary refill takes less than 2 seconds.   Psychiatric: She has a normal mood and affect. Her behavior is normal. Judgment and thought content normal.       ED Course   Critical Care    Date/Time: 10/16/2022 5:05 PM  Performed by: Steven Mascorro PA-C  Authorized by: Rob Shea DO   Direct patient critical care time: 10 minutes  Additional history critical care time: 5 minutes  Ordering / reviewing critical care time: 5 minutes  Documentation critical care time: 5 minutes  Consulting other physicians critical care time: 10 minutes  Total critical care time (exclusive of procedural time) : 35 minutes  Critical care time was exclusive of separately billable procedures and treating other patients and teaching time.  Critical care was necessary to treat or prevent imminent or life-threatening deterioration of the following conditions: cardiac  failure, circulatory failure, dehydration, sepsis, shock, renal failure and respiratory failure.  Critical care was time spent personally by me on the following activities: development of treatment plan with patient or surrogate, discussions with consultants, interpretation of cardiac output measurements, evaluation of patient's response to treatment, obtaining history from patient or surrogate, examination of patient, ordering and performing treatments and interventions, ordering and review of laboratory studies, ordering and review of radiographic studies, pulse oximetry, re-evaluation of patient's condition and review of old charts.      Labs Reviewed   CBC W/ AUTO DIFFERENTIAL - Abnormal; Notable for the following components:       Result Value    RBC 3.57 (*)     Hemoglobin 10.6 (*)     Hematocrit 30.6 (*)     Platelets 113 (*)     Immature Granulocytes 0.6 (*)     Lymph # 0.6 (*)     Gran % 75.0 (*)     Lymph % 12.0 (*)     All other components within normal limits   COMPREHENSIVE METABOLIC PANEL - Abnormal; Notable for the following components:    Sodium 131 (*)     Potassium 5.3 (*)     Glucose 467 (*)     BUN 82 (*)     Creatinine 6.5 (*)     Calcium 8.5 (*)     Albumin 2.7 (*)     Total Bilirubin 1.5 (*)     Anion Gap 7 (*)     eGFR 8.1 (*)     All other components within normal limits    Narrative:     Glucose critical result(s) called and verbal readback obtained from   SHAILA CHAVEZ  by KB5 10/16/2022 15:16   URINALYSIS, REFLEX TO URINE CULTURE - Abnormal; Notable for the following components:    Appearance, UA Hazy (*)     Glucose, UA 4+ (*)     Ketones, UA Trace (*)     Occult Blood UA 2+ (*)     Leukocytes, UA 2+ (*)     All other components within normal limits    Narrative:     Specimen Source->Urine   URINALYSIS MICROSCOPIC - Abnormal; Notable for the following components:    RBC, UA 12 (*)     WBC, UA >100 (*)     Yeast, UA Many (*)     Hyaline Casts, UA 7 (*)     All other components  within normal limits    Narrative:     Specimen Source->Urine   ISTAT PROCEDURE - Abnormal; Notable for the following components:    POC HCO3 23.2 (*)     POC SATURATED O2 88 (*)     All other components within normal limits   CULTURE, URINE   LIPASE   MAGNESIUM   SARS-COV-2 RNA AMPLIFICATION, QUAL   BETA - HYDROXYBUTYRATE, SERUM   POCT URINE PREGNANCY   POCT GLUCOSE MONITORING CONTINUOUS        ECG Results              EKG 12-lead (In process)  Result time 10/16/22 15:58:37      In process by Interface, Lab In [facility] (10/16/22 15:58:37)                   Narrative:    Test Reason : R10.9,    Vent. Rate : 092 BPM     Atrial Rate : 092 BPM     P-R Int : 172 ms          QRS Dur : 082 ms      QT Int : 378 ms       P-R-T Axes : 056 -46 044 degrees     QTc Int : 467 ms    Normal sinus rhythm  Left anterior fascicular block  Anterior infarct (cited on or before 16-OCT-2022)  Abnormal ECG  When compared with ECG of 04-OCT-2022 09:47,  Left anterior fascicular block is now Present    Referred By: [email or web address]   SELF           Confirmed By:                                   Imaging Results              CT Abdomen Pelvis With Contrast (Final result)  Result time 10/16/22 18:32:51      Final result by Von Bland MD (10/16/22 18:32:51)                   Narrative:    CMS MANDATED QUALITY DATA - CT RADIATION  436    All CT scans at this facility utilize dose modulation, iterative reconstruction, and/or weight based dosing when appropriate to reduce radiation dose to as low as reasonably achievable.    CT ABDOMEN PELVIS WITH IV CONTRAST    CLINICAL HISTORY:  32 years Female Abdominal pain, acute, nonlocalized    COMPARISON: CT abdomen and pelvis August 25, 2022    FINDINGS: Images through the lower thorax demonstrate scattered groundglass opacities bilaterally. Bone window images show no acute or aggressive osseous abnormality.    Diffuse body wall edema. No focal hepatic lesion. Gallbladder is absent. No bile duct dilation.  Portal vein is patent. Spleen appears normal. Pancreas is unremarkable. No adrenal lesion. No renal lesion or hydronephrosis. Diminished perfusion of both kidneys suggesting renal dysfunction. Ureters are normal in caliber. Urinary bladder is collapsed.    Diffuse gastric wall thickening. No evidence of small bowel obstruction. Diffuse colonic wall thickening. Diffuse mesenteric edema and small volume free fluid within the abdomen and pelvis.    IMPRESSION:    Diffuse body wall and mesenteric edema.    Small volume free fluid in the abdomen and pelvis.    Diminished perfusion of both kidneys suggesting renal dysfunction.    Diffuse edema/wall thickening of the stomach and colon, most likely on the basis of volume overload considering concurrent findings.    Status post cholecystectomy.    Electronically signed by:  Von Bland MD  10/16/2022 6:32 PM CDT Workstation: [id number]                                     Medications   mupirocin 2 % ointment (has no administration in time range)   epoetin teresa-epbx injection 3,400 Units (has no administration in time range)   HYDROmorphone injection 0.5 mg (0.5 mg Intravenous Given 10/16/22 1639)   ondansetron injection 4 mg (4 mg Intravenous Given 10/16/22 1639)   insulin regular injection 4 Units 0.04 mL (4 Units Intravenous Given 10/16/22 1640)   0.9%  NaCl infusion (0 mLs Intravenous Stopped 10/16/22 1856)   iodixanoL (VISIPAQUE 320) injection 100 mL (100 mLs Intravenous Given 10/16/22 1800)   ondansetron injection 4 mg (4 mg Intravenous Given 10/16/22 1800)     Medical Decision Making:   Initial Assessment:   Nontoxic, well-appearing.  Patient is seemingly in significant pain.  I have ordered Dilaudid.  ED Management:  32-year-old female presents emergency room for evaluation of severe abdominal pain in the setting of missed dialysis.  Patient has missed 2 dialysis appointments.  Labs today show KANWAL on CKD with a creatinine of 6.5, BUN of 82.  Potassium is 5.3.   Patient does have a glucose of 467.  Reassuring anion gap, normal bicarb.  Ketones negative. Patient has stable anemia of chronic disease.  EKG normal sinus rhythm without acute ischemic changes.  Normal axis, normal intervals.  CT is not concerning for acute intra-abdominal or intrapelvic pathology, remarkable for small volume free fluid and diffuse edema/wall thickening of the stomach:.  I discussed patient case with Nephrology, Dr. Figueroa who recommends admission to hospital medicine and plan for dialysis.  She received pain control, IV fluids and 4 units of insulin given hyperglycemia.  Hospital Medicine was consulted for admission.      Disposition:  Stable, admit.    I discuss this patient case with the cosigning physician, who agrees with diagnosis and plan of care. This note was written using the assistance of a dictation program and may contain grammatical errors.            ED Course as of 10/16/22 1913   [place] Oct 16, 2022   1639 Nephrologist, Dr. Roy [AN]      ED Course User Index  [AN] Steven Mascorro PA-C                 Clinical Impression:   Final diagnoses:  [R10.9] Abdominal pain  [R52] Intractable pain (Primary)  [N17.9] KANWAL (acute kidney injury)  [R10.9] Abdominal pain, unspecified abdominal location        ED Disposition Condition    Admit Stable                Steven Mascorro PA-C  10/16/22 1843       Steven Mascorro PA-C  10/16/22 1913

## 2022-10-17 LAB
ALBUMIN SERPL BCP-MCNC: 2.2 G/DL (ref 3.5–5.2)
ALP SERPL-CCNC: 120 U/L (ref 55–135)
ALT SERPL W/O P-5'-P-CCNC: 13 U/L (ref 10–44)
ANION GAP SERPL CALC-SCNC: 7 MMOL/L (ref 8–16)
AST SERPL-CCNC: 17 U/L (ref 10–40)
BASOPHILS # BLD AUTO: 0.03 K/UL (ref 0–0.2)
BASOPHILS NFR BLD: 0.5 % (ref 0–1.9)
BILIRUB SERPL-MCNC: 1.2 MG/DL (ref 0.1–1)
BUN SERPL-MCNC: 80 MG/DL (ref 6–20)
C DIFF GDH STL QL: POSITIVE
C DIFF TOX A+B STL QL IA: NEGATIVE
C DIFF TOX GENS STL QL NAA+PROBE: POSITIVE
CALCIUM SERPL-MCNC: 7.8 MG/DL (ref 8.7–10.5)
CHLORIDE SERPL-SCNC: 106 MMOL/L (ref 95–110)
CO2 SERPL-SCNC: 21 MMOL/L (ref 23–29)
CREAT SERPL-MCNC: 6.3 MG/DL (ref 0.5–1.4)
DIFFERENTIAL METHOD: ABNORMAL
EOSINOPHIL # BLD AUTO: 0.1 K/UL (ref 0–0.5)
EOSINOPHIL NFR BLD: 1.8 % (ref 0–8)
ERYTHROCYTE [DISTWIDTH] IN BLOOD BY AUTOMATED COUNT: 13.6 % (ref 11.5–14.5)
EST. GFR  (NO RACE VARIABLE): 8.4 ML/MIN/1.73 M^2
GLUCOSE SERPL-MCNC: 175 MG/DL (ref 70–110)
GLUCOSE SERPL-MCNC: 200 MG/DL (ref 70–110)
GLUCOSE SERPL-MCNC: 210 MG/DL (ref 70–110)
GLUCOSE SERPL-MCNC: 285 MG/DL (ref 70–110)
GLUCOSE SERPL-MCNC: 332 MG/DL (ref 70–110)
HCT VFR BLD AUTO: 32.2 % (ref 37–48.5)
HGB BLD-MCNC: 11.1 G/DL (ref 12–16)
IMM GRANULOCYTES # BLD AUTO: 0.01 K/UL (ref 0–0.04)
IMM GRANULOCYTES NFR BLD AUTO: 0.2 % (ref 0–0.5)
LYMPHOCYTES # BLD AUTO: 0.8 K/UL (ref 1–4.8)
LYMPHOCYTES NFR BLD: 12.7 % (ref 18–48)
MCH RBC QN AUTO: 29.2 PG (ref 27–31)
MCHC RBC AUTO-ENTMCNC: 34.5 G/DL (ref 32–36)
MCV RBC AUTO: 85 FL (ref 82–98)
MONOCYTES # BLD AUTO: 0.6 K/UL (ref 0.3–1)
MONOCYTES NFR BLD: 9 % (ref 4–15)
NEUTROPHILS # BLD AUTO: 4.6 K/UL (ref 1.8–7.7)
NEUTROPHILS NFR BLD: 75.8 % (ref 38–73)
NRBC BLD-RTO: 0 /100 WBC
PLATELET # BLD AUTO: 123 K/UL (ref 150–450)
PMV BLD AUTO: 10.7 FL (ref 9.2–12.9)
POTASSIUM SERPL-SCNC: 4.7 MMOL/L (ref 3.5–5.1)
PROT SERPL-MCNC: 5.6 G/DL (ref 6–8.4)
RBC # BLD AUTO: 3.8 M/UL (ref 4–5.4)
SODIUM SERPL-SCNC: 134 MMOL/L (ref 136–145)
TROPONIN I SERPL DL<=0.01 NG/ML-MCNC: 0.09 NG/ML
WBC # BLD AUTO: 6.12 K/UL (ref 3.9–12.7)
WBC #/AREA STL HPF: NORMAL /[HPF]

## 2022-10-17 PROCEDURE — 84484 ASSAY OF TROPONIN QUANT: CPT | Performed by: NURSE PRACTITIONER

## 2022-10-17 PROCEDURE — 63600175 PHARM REV CODE 636 W HCPCS: Performed by: NURSE PRACTITIONER

## 2022-10-17 PROCEDURE — 63600175 PHARM REV CODE 636 W HCPCS: Performed by: INTERNAL MEDICINE

## 2022-10-17 PROCEDURE — 96376 TX/PRO/DX INJ SAME DRUG ADON: CPT

## 2022-10-17 PROCEDURE — 90935 HEMODIALYSIS ONE EVALUATION: CPT

## 2022-10-17 PROCEDURE — G0378 HOSPITAL OBSERVATION PER HR: HCPCS

## 2022-10-17 PROCEDURE — 63600175 PHARM REV CODE 636 W HCPCS: Mod: JG | Performed by: INTERNAL MEDICINE

## 2022-10-17 PROCEDURE — 85025 COMPLETE CBC W/AUTO DIFF WBC: CPT | Performed by: NURSE PRACTITIONER

## 2022-10-17 PROCEDURE — 89055 LEUKOCYTE ASSESSMENT FECAL: CPT | Performed by: INTERNAL MEDICINE

## 2022-10-17 PROCEDURE — 87045 FECES CULTURE AEROBIC BACT: CPT | Performed by: INTERNAL MEDICINE

## 2022-10-17 PROCEDURE — 87449 NOS EACH ORGANISM AG IA: CPT | Performed by: INTERNAL MEDICINE

## 2022-10-17 PROCEDURE — 25000003 PHARM REV CODE 250: Performed by: NURSE PRACTITIONER

## 2022-10-17 PROCEDURE — 80053 COMPREHEN METABOLIC PANEL: CPT | Performed by: NURSE PRACTITIONER

## 2022-10-17 PROCEDURE — 96375 TX/PRO/DX INJ NEW DRUG ADDON: CPT

## 2022-10-17 PROCEDURE — 82962 GLUCOSE BLOOD TEST: CPT

## 2022-10-17 PROCEDURE — 87493 C DIFF AMPLIFIED PROBE: CPT | Performed by: INTERNAL MEDICINE

## 2022-10-17 PROCEDURE — 25000003 PHARM REV CODE 250: Performed by: STUDENT IN AN ORGANIZED HEALTH CARE EDUCATION/TRAINING PROGRAM

## 2022-10-17 PROCEDURE — 87046 STOOL CULTR AEROBIC BACT EA: CPT | Performed by: INTERNAL MEDICINE

## 2022-10-17 RX ORDER — IBUPROFEN 200 MG
16 TABLET ORAL
Status: DISCONTINUED | OUTPATIENT
Start: 2022-10-17 | End: 2022-10-18 | Stop reason: HOSPADM

## 2022-10-17 RX ORDER — HEPARIN SODIUM 1000 [USP'U]/ML
4000 INJECTION, SOLUTION INTRAVENOUS; SUBCUTANEOUS
Status: DISCONTINUED | OUTPATIENT
Start: 2022-10-17 | End: 2022-10-18 | Stop reason: HOSPADM

## 2022-10-17 RX ORDER — INSULIN ASPART 100 [IU]/ML
0-5 INJECTION, SOLUTION INTRAVENOUS; SUBCUTANEOUS
Status: DISCONTINUED | OUTPATIENT
Start: 2022-10-17 | End: 2022-10-18 | Stop reason: HOSPADM

## 2022-10-17 RX ORDER — IBUPROFEN 200 MG
24 TABLET ORAL
Status: DISCONTINUED | OUTPATIENT
Start: 2022-10-17 | End: 2022-10-18 | Stop reason: HOSPADM

## 2022-10-17 RX ORDER — HYDROMORPHONE HYDROCHLORIDE 1 MG/ML
1 INJECTION, SOLUTION INTRAMUSCULAR; INTRAVENOUS; SUBCUTANEOUS EVERY 4 HOURS PRN
Status: DISCONTINUED | OUTPATIENT
Start: 2022-10-17 | End: 2022-10-18 | Stop reason: HOSPADM

## 2022-10-17 RX ORDER — GLUCAGON 1 MG
1 KIT INJECTION
Status: DISCONTINUED | OUTPATIENT
Start: 2022-10-17 | End: 2022-10-18 | Stop reason: HOSPADM

## 2022-10-17 RX ADMIN — ONDANSETRON 4 MG: 2 INJECTION INTRAMUSCULAR; INTRAVENOUS at 04:10

## 2022-10-17 RX ADMIN — MORPHINE SULFATE 4 MG: 4 INJECTION, SOLUTION INTRAMUSCULAR; INTRAVENOUS at 07:10

## 2022-10-17 RX ADMIN — LEVETIRACETAM 500 MG: 500 TABLET, FILM COATED ORAL at 09:10

## 2022-10-17 RX ADMIN — HYDRALAZINE HYDROCHLORIDE 100 MG: 25 TABLET ORAL at 06:10

## 2022-10-17 RX ADMIN — FUROSEMIDE 40 MG: 40 TABLET ORAL at 08:10

## 2022-10-17 RX ADMIN — HYDRALAZINE HYDROCHLORIDE 100 MG: 25 TABLET ORAL at 09:10

## 2022-10-17 RX ADMIN — EPOETIN ALFA-EPBX 3400 UNITS: 10000 INJECTION, SOLUTION INTRAVENOUS; SUBCUTANEOUS at 12:10

## 2022-10-17 RX ADMIN — FIDAXOMICIN 200 MG: 200 TABLET, FILM COATED ORAL at 09:10

## 2022-10-17 RX ADMIN — LEVETIRACETAM 500 MG: 500 TABLET, FILM COATED ORAL at 08:10

## 2022-10-17 RX ADMIN — APIXABAN 5 MG: 5 TABLET, FILM COATED ORAL at 09:10

## 2022-10-17 RX ADMIN — HEPARIN SODIUM 4000 UNITS: 1000 INJECTION, SOLUTION INTRAVENOUS; SUBCUTANEOUS at 12:10

## 2022-10-17 RX ADMIN — PANTOPRAZOLE SODIUM 40 MG: 40 TABLET, DELAYED RELEASE ORAL at 06:10

## 2022-10-17 RX ADMIN — FLUOXETINE 20 MG: 20 CAPSULE ORAL at 09:10

## 2022-10-17 RX ADMIN — MORPHINE SULFATE 2 MG: 2 INJECTION, SOLUTION INTRAMUSCULAR; INTRAVENOUS at 09:10

## 2022-10-17 RX ADMIN — HYDROMORPHONE HYDROCHLORIDE 1 MG: 1 INJECTION, SOLUTION INTRAMUSCULAR; INTRAVENOUS; SUBCUTANEOUS at 08:10

## 2022-10-17 RX ADMIN — HYDRALAZINE HYDROCHLORIDE 100 MG: 25 TABLET ORAL at 01:10

## 2022-10-17 RX ADMIN — CARVEDILOL 25 MG: 12.5 TABLET, FILM COATED ORAL at 08:10

## 2022-10-17 RX ADMIN — APIXABAN 5 MG: 5 TABLET, FILM COATED ORAL at 08:10

## 2022-10-17 RX ADMIN — MORPHINE SULFATE 4 MG: 4 INJECTION, SOLUTION INTRAMUSCULAR; INTRAVENOUS at 04:10

## 2022-10-17 NOTE — NURSING
Consent and Hep b status verified, removed 3000 uf net, no issues with catheter, dressing changed, CDI. Patient stable throughout treatment. Educated patient on HD treatment. Report given to SHAILA Lyman     10/17/22 7013   Handoff Report   Received From pepito   Given To roxane   Vital Signs   Temp 97.7 °F (36.5 °C)   Temp src Oral   Pulse 86   Heart Rate Source Monitor   Resp 16   SpO2 98 %   Flow (L/min) 2   O2 Device (Oxygen Therapy) nasal cannula   BP Location Left arm   BP Method Automatic   Patient Position Lying   Assessments (Pre/Post)   Consent Obtained yes   Safety vein preservation armband present   Date Hepatitis Profile Obtained 07/07/22   Blood Liters Processed (BLP) 66.5   Transport Modality stretcher   Level of Consciousness (AVPU) alert   Dialyzer Clearance mildly streaked   Pain   Preferred Pain Scale number (Numeric Rating Pain Scale)   Comfort/Acceptable Pain Level 0   Pain Rating (0-10): Rest 0   Pain Rating (0-10): Activity 0   Pain Management Interventions quiet environment facilitated;position adjusted;pillow support provided   Pain/Comfort Interventions   Fever Reduction/Comfort Measures lightweight bedding;lightweight clothing   Pre-Hemodialysis Assessment   Additional Dialysis Information Needed Yes   Patient Status Departed   Treatment Consent Verified Yes   Treatment Status Completed        Hemodialysis Catheter 07/26/22 1457 right internal jugular   Placement Date/Time: 07/26/22 1457   Present Prior to Hospital Arrival?: No  Hand Hygiene: Performed  Barrier Precautions: Performed  Skin Antisepsis: ChloraPrep  Hemodialysis Catheter Type: Tunneled catheter  Location: right internal jugular  Cathete...   Line Necessity Review CRRT/HD   Site Assessment No drainage;No redness;No swelling;No warmth   Line Securement Device Secured with sutures   Dressing Type Biopatch in place;Central line dressing   Dressing Status Clean;Dry;Intact   Dressing Intervention Integrity maintained   Date on  Dressing 10/17/22   Dressing Due to be Changed 10/24/22   Venous Patency/Care flushed w/o difficulty;heparin locked   Arterial Patency/Care flushed w/o difficulty;heparin locked   Post-Hemodialysis Assessment   Rinseback Volume (mL) 250 mL   Blood Volume Processed (Liters) 66.5 L   Dialyzer Clearance Lightly streaked   Duration of Treatment 180 minutes   Additional Fluid Intake (mL) 500 mL   Total UF (mL) 3500 mL   Net Fluid Removal 3000   Patient Response to Treatment lamar   Post-Treatment Weight 65 kg (143 lb 4.8 oz)   Treatment Weight Change -3   Post-Hemodialysis Comments stable

## 2022-10-17 NOTE — NURSING
Patient arrived to room   3019.  Has shirt, pants, slippers, and purse. She is rocking back and forth and moaning, clutching her right side and abdomen.  PRN pain and nausea meds given.    16:38:  Patient appears asleep and comfortable.

## 2022-10-17 NOTE — ED NOTES
PT CALLS OUT AND STATES SHE HAD A BM ON HERSELF AGAIN. PT VOICED HAVING A NORMAL BM YESTERDAY BUT STARTED HAVING DIARRHEA TODAY.

## 2022-10-17 NOTE — PLAN OF CARE
Spoke with patient at bedside.  She states she will be going to address shown on facesheet when she is discharged.  She will need diaysis set up nearest this address and medicaid transport to and from dialysis. Her scheduled dialysis days are Tuesday, Thursday, Saturday.

## 2022-10-17 NOTE — PLAN OF CARE
Spoke to [facility].  Nurse states that dialysis was just changed for pt to be able to stay with her dad and have dilalysis nearer.   Pt now stating she wants to go back to her home in [place].  [facility] nurse states that the closest [facility] center near her [place] address is in [facility].  This [facility] has no availability as they are short-staffed.  [facility] states they will call Pt's dad and speak to him.  Pt missed 2 dialysis sessions this past week.  [facility] given this 's contact number and name.

## 2022-10-17 NOTE — CONSULTS
Nephrology Consult Note        Patient Name: Tabby Howard  MRN: [id number]    Patient Class: OP- Observation   Admission Date: 10/16/2022  Length of Stay: 0 days  Date of Service: 10/17/2022    Attending Physician: Isai Grady, *  Primary Care Provider: [facility]    Reason for Consult: esrd/anemia/htn/shpt/abdominal pain    SUBJECTIVE:     HPI: 32F with ESRD on HD TTS, gastroparesis, type 2 diabetes, hypertension, SVT, sickle cell trait, chronic diastolic heart failure presents to emergency room with abdominal pain and missed hemodialysis sessions. She missed her last 2 dialysis sessions because she did not have a ride to the hemodialysis unit. Since then, she has been having abdominal pain with episodes of diarrhea. In the emergency room, a CT of the abdomen and pelvis showed mesenteric edema.    HD today.    Past Medical History:   Diagnosis Date    CKD (chronic kidney disease), stage IV 2022    Diabetes mellitus due to underlying condition with unspecified complications 2022    Gastroparesis 2022    Heart failure with preserved ejection fraction 2022    EF 55% on 3/22    History of gastroesophageal reflux (GERD)     History of supraventricular tachycardia     Hyperkalemia 2022    Hypertensive emergency 2022    Sickle cell trait 2022    Type 2 diabetes mellitus      Past Surgical History:   Procedure Laterality Date     SECTION      x 3    COLONOSCOPY      COLONOSCOPY N/A 2022    Procedure: COLONOSCOPY;  Surgeon: Jagdeep Cedeno MD;  Location: [facility];  Service: Endoscopy;  Laterality: N/A;    ESOPHAGOGASTRODUODENOSCOPY N/A 10/18/2019    Procedure: ESOPHAGOGASTRODUODENOSCOPY (EGD);  Surgeon: Gianluca Mendez MD;  Location: [facility];  Service: Endoscopy;  Laterality: N/A;    ESOPHAGOGASTRODUODENOSCOPY N/A 2022    Procedure: EGD (ESOPHAGOGASTRODUODENOSCOPY);  Surgeon: Micky Paredes III, MD;  Location: [facility]  ENDO;  Service: Endoscopy;  Laterality: N/A;    LAPAROSCOPIC CHOLECYSTECTOMY N/A 07/30/2022    Procedure: CHOLECYSTECTOMY, LAPAROSCOPIC;  Surgeon: Grey Perez MD;  Location: [facility] OR;  Service: General;  Laterality: N/A;    PLACEMENT OF DUAL-LUMEN VASCULAR CATHETER Left 07/12/2022    Procedure: INSERTION-CATHETER-JOSEPH;  Surgeon: Dionte Gan MD;  Location: [facility] OR;  Service: General;  Laterality: Left;    PLACEMENT OF DUAL-LUMEN VASCULAR CATHETER Right 07/26/2022    Procedure: INSERTION-CATHETER-Hemosplit;  Surgeon: Dionte Gan MD;  Location: [facility] OR;  Service: General;  Laterality: Right;     Family History   Problem Relation Age of Onset    Diabetes Mother     Diabetes Father      Social History     Tobacco Use    Smoking status: Never    Smokeless tobacco: Never   Substance Use Topics    Alcohol use: No    Drug use: No       Review of patient's allergies indicates:   Allergen Reactions    Penicillins Hives       Outpatient meds:  No current facility-administered medications on file prior to encounter.     Current Outpatient Medications on File Prior to Encounter   Medication Sig Dispense Refill    amLODIPine (NORVASC) 10 MG tablet Take 1 tablet (10 mg total) by mouth once daily. 30 tablet 11    apixaban (ELIQUIS) 5 mg Tab Take 1 tablet (5 mg total) by mouth 2 (two) times daily. For second month on. 60 tablet 2    carvediloL (COREG) 25 MG tablet Take 1 tablet (25 mg total) by mouth 2 (two) times daily. 60 tablet 2    cloNIDine 0.2 mg/24 hr td ptwk (CATAPRES) 0.2 mg/24 hr Place 1 patch onto the skin every 7 days. 4 patch 2    FLUoxetine 20 MG capsule Take 1 capsule (20 mg total) by mouth once daily. 30 capsule 1    furosemide (LASIX) 40 MG tablet Take 1 tablet (40 mg total) by mouth 2 (two) times daily. 60 tablet 0    hydrALAZINE (APRESOLINE) 100 MG tablet Take 1 tablet (100 mg total) by mouth every 8 (eight) hours. 90 tablet 2    insulin aspart U-100 (NOVOLOG) 100 unit/mL (3 mL) InPn pen Inject 1-10  Units into the skin before meals and at bedtime as needed (Hyperglycemia). 3 mL 3    isosorbide mononitrate (IMDUR) 60 MG 24 hr tablet Take 2 tablets (120 mg total) by mouth once daily. 30 tablet 1    LANTUS U-100 INSULIN 100 unit/mL injection Inject 5 Units into the skin once daily.      levETIRAcetam (KEPPRA) 500 MG Tab Take 1 tablet (500 mg total) by mouth 2 (two) times daily. 60 tablet 1    albuterol (PROVENTIL/VENTOLIN HFA) 90 mcg/actuation inhaler Inhale 2 puffs into the lungs every 6 (six) hours as needed for Wheezing. Rescue      ARIPiprazole (ABILIFY) 5 MG Tab Take 5 mg by mouth once daily.      desvenlafaxine succinate (PRISTIQ) 50 MG Tb24 Take 50 mg by mouth every other day.      famotidine (PEPCID) 20 MG tablet Take 1 tablet (20 mg total) by mouth once daily. 30 tablet 0    ondansetron (ZOFRAN-ODT) 4 MG TbDL Take 1 tablet (4 mg total) by mouth every 8 (eight) hours as needed (nausea, vomiting). 12 tablet 0    oxyCODONE-acetaminophen (PERCOCET) 5-325 mg per tablet Take 1 tablet by mouth every 6 (six) hours as needed for Pain. (Patient not taking: No sig reported) 20 tablet 0    pantoprazole (PROTONIX) 40 MG tablet Take 1 tablet (40 mg total) by mouth once daily. 30 tablet 0    polyethylene glycol (GLYCOLAX) 17 gram/dose powder Take 17 g by mouth once daily. 510 g 1    [DISCONTINUED] atenoloL (TENORMIN) 50 MG tablet Take 1 tablet (50 mg total) by mouth every other day. 45 tablet 3    [DISCONTINUED] omeprazole (PRILOSEC) 20 MG capsule Take 2 capsules (40 mg total) by mouth once daily. for 10 days 20 capsule 0    [DISCONTINUED] torsemide (DEMADEX) 20 MG Tab Take 1 tablet (20 mg total) by mouth 2 (two) times a day. (Patient taking differently: Take 20 mg by mouth once daily.) 60 tablet 1       Scheduled meds:   amLODIPine  10 mg Oral Daily    apixaban  5 mg Oral BID    carvediloL  25 mg Oral BID    cloNIDine 0.2 mg/24 hr td ptwk  1 patch Transdermal Q7 Days    epoetin teresa-epbx  50 Units/kg Intravenous Every  Mon, Wed, Fri    FLUoxetine  20 mg Oral Daily    furosemide  40 mg Oral BID    hydrALAZINE  100 mg Oral Q8H    insulin detemir U-100  5 Units Subcutaneous Daily    isosorbide mononitrate  120 mg Oral Daily    levETIRAcetam  500 mg Oral BID    mupirocin   Nasal BID    pantoprazole  40 mg Oral Before breakfast       Infusions:      PRN meds:  acetaminophen, dextrose 10%, dextrose 10%, glucagon (human recombinant), glucose, glucose, insulin aspart U-100, melatonin, morphine, morphine, ondansetron, sodium chloride 0.9%    Review of Systems:  Review of Systems   Constitutional:  Positive for malaise/fatigue. Negative for chills, fever and weight loss.   HENT:  Negative for hearing loss and nosebleeds.    Eyes:  Negative for blurred vision, double vision and photophobia.   Respiratory:  Negative for cough, shortness of breath and wheezing.    Cardiovascular:  Negative for chest pain, palpitations and leg swelling.   Gastrointestinal:  Positive for abdominal pain and diarrhea. Negative for constipation, heartburn, nausea and vomiting.   Genitourinary:  Negative for dysuria, frequency and urgency.   Musculoskeletal:  Negative for falls, joint pain and myalgias.   Skin:  Negative for itching and rash.   Neurological:  Positive for weakness. Negative for dizziness, speech change, focal weakness, loss of consciousness and headaches.   Endo/Heme/Allergies:  Does not bruise/bleed easily.   Psychiatric/Behavioral:  Negative for depression and substance abuse. The patient is not nervous/anxious.      OBJECTIVE:     Vital Signs and IO:  Temp:  [97.9 °F (36.6 °C)]   Pulse:  []   Resp:  [10-25]   BP: (120-162)/()   SpO2:  [95 %-100 %]   No intake/output data recorded.  Wt Readings from Last 5 Encounters:   10/16/22 68 kg (150 lb)   10/06/22 68 kg (149 lb 14.6 oz)   08/26/22 70 kg (154 lb 5.2 oz)   08/09/22 74.8 kg (165 lb)   07/30/22 74.8 kg (165 lb)     Body mass index is 27.44 kg/m².    Physical Exam  Constitutional:        General: She is not in acute distress.     Appearance: She is well-developed. She is not diaphoretic.   HENT:      Head: Normocephalic and atraumatic.      Mouth/Throat:      Mouth: Mucous membranes are moist.   Eyes:      General: No scleral icterus.     Pupils: Pupils are equal, round, and reactive to light.   Cardiovascular:      Rate and Rhythm: Normal rate and regular rhythm.   Pulmonary:      Effort: Pulmonary effort is normal. No respiratory distress.      Breath sounds: No stridor.   Abdominal:      General: There is no distension.      Palpations: Abdomen is soft.   Musculoskeletal:         General: No deformity. Normal range of motion.      Cervical back: Neck supple.   Skin:     General: Skin is warm and dry.      Findings: No erythema or rash.   Neurological:      Mental Status: She is alert and oriented to person, place, and time.      Cranial Nerves: No cranial nerve deficit.   Psychiatric:         Behavior: Behavior normal.       Laboratory:  Recent Labs   Lab 10/16/22  1428 10/17/22  0213   * 134*   K 5.3* 4.7    106   CO2 23 21*   BUN 82* 80*   CREATININE 6.5* 6.3*   * 332*       Recent Labs   Lab 10/16/22  1428 10/17/22  0213   CALCIUM 8.5* 7.8*   ALBUMIN 2.7* 2.2*   MG 2.5  --        Recent Labs   Lab 07/08/22  0516   PTH, Intact 289.8 H       No results for input(s): POCTGLUCOSE in the last 168 hours.    Recent Labs   Lab 05/15/22  1954 07/22/22  1653 08/24/22  0218   Hemoglobin A1C 5.8 H 6.0 H 6.2       Recent Labs   Lab 10/16/22  1428 10/17/22  0213   WBC 4.68 6.12   HGB 10.6* 11.1*   HCT 30.6* 32.2*   * 123*   MCV 86 85   MCHC 34.6 34.5   MONO 10.7  0.5 9.0  0.6       Recent Labs   Lab 10/16/22  1428 10/17/22  0213   BILITOT 1.5* 1.2*   PROT 6.3 5.6*   ALBUMIN 2.7* 2.2*   ALKPHOS 135 120   ALT 16 13   AST 15 17       Recent Labs   Lab 06/11/22  1115 07/07/22  0912 10/16/22  1736   Color, UA Yellow Yellow Yellow   Appearance, UA Clear Clear Hazy A   pH, UA 7.0  6.0 7.0   Specific [place], UA 1.015 1.020 1.020   Protein, UA 3+ A 3+ A 4+   Glucose, UA 2+ A Negative 4+ A   Ketones, UA Negative Negative Trace A   Urobilinogen, UA Negative Negative Negative   Bilirubin (UA) Negative Negative Negative   Occult Blood UA 1+ A 2+ A 2+ A   Nitrite, UA Negative Negative Negative   RBC, UA 3 3 12 H   WBC, UA 4 8 H >100 H   Bacteria Occasional Rare Negative   Hyaline Casts, UA 0 0 7 A       Recent Labs   Lab 07/24/22  0445 10/04/22  0901 10/16/22  1643   POC PH 7.637 HH 7.378 7.398   POC PCO2 26.0 LL 54.4 H 37.6   POC HCO3 27.8 32.0 H 23.2 L   POC PO2 123 H 26 L 55   POC SATURATED O2 99 44 L 88 L   POC BE 7 7 -2   Sample ARTERIAL VENOUS VENOUS       Microbiology Results (last 7 days)       Procedure Component Value Units Date/Time    Urine culture [[id number]] Collected: 10/16/22 1736    Order Status: Completed Specimen: Urine Updated: 10/17/22 0634     Urine Culture, Routine No growth to date    Narrative:      Specimen Source->Urine    C Diff Toxin by PCR [[id number]]  (Abnormal) Collected: 10/17/22 0215    Order Status: Completed Updated: 10/17/22 0434     C. diff PCR Positive     Comment: Positive CDiff PCR critical result(s) called and verbal readback   obtained from Gary Walton RN in ED by KS3 10/17/2022 04:34         Narrative:      Positive CDiff PCR critical result(s) called and verbal readback   obtained from Gary Walton RN in ED by KS3 10/17/2022 04:34    Clostridium difficile EIA [[id number]]  (Abnormal) Collected: 10/17/22 0215    Order Status: Completed Specimen: Stool Updated: 10/17/22 0310     C. diff Antigen Positive     Comment: results inconclusive, to be determined via PCR method        C difficile Toxins A+B, EIA Negative     Comment: Testing not recommended for children <24 months old.       Stool culture [[id number]] Collected: 10/17/22 0215    Order Status: Sent Specimen: Stool Updated: 10/17/22 0220            ASSESSMENT/PLAN:     ESRD on HD TTS via AVF  Continue  current dialysis prescription.  Next HD per schedule, emergency HD today.  Renal diet - low K, low phos.  No IVs or BP checks on access and/or non-dominant arm.    Anemia of CKD  Sickle cell trait  Hgb and HCT are acceptable. Monitor for now.  Will provide SHELLEY and/or IV iron PRN.    MBD / Secondary HPT  Ca, Phos, PTH and vitamin D levels are acceptable.   Phos binders, vitamin D and analogues, calcimimetics will be given as needed.    HTN  BP seem controlled.   Tolerate asymptomatic HTN up to -160.  Continue home meds.  Low sodium diet.    Abdominal pain due to gastroparesis  Management per primary team    Thank you for allowing us to participate in the care of your patient!   We will follow the patient and provide recommendations as needed.    Patient care time was spent personally by me on the following activities:     Obtaining a history.  Examination of patient.  Providing medical care at the patients bedside.  Developing a treatment plan with patient or surrogate and bedside caregivers.  Ordering and reviewing laboratory studies, radiographic studies, pulse oximetry.  Ordering and performing treatments and interventions.  Evaluation of patient's response to treatment.  Discussions with consultants while on the unit and immediately available to the patient.  Re-evaluation of the patient's condition.  Documentation in the medical record.     Mando Benitez MD    [facility]  [address] [place] [address], [place] [address]    [phone] - tel  [phone] - fax    10/17/2022

## 2022-10-17 NOTE — PLAN OF CARE
[facility] - Emergency Dept  Initial Discharge Assessment       Primary Care Provider: [facility]    Admission Diagnosis: ESRD needing dialysis [N18.6, Z99.2]    Admission Date: 10/16/2022  Expected Discharge Date:      Pt unable to answer questions on initial assessment.  Spoke with her father, Garcia Howard, via telephone to get some information.  Pt lives with her father at times and at times lives alone.      Payor: MEDICAID / Plan: [place] / Product Type: Managed Medicaid /     Extended Emergency Contact Information  Primary Emergency Contact: Garcia Howard  Mobile Phone: [phone]  Relation: Father  Preferred language: English   needed? No  Secondary Emergency Contact: Tanya Howard  Mobile Phone: [phone]  Relation: Step parent  Preferred language: English   needed? No    Discharge Plan A: Home, Home with family  Discharge Plan B: Home with family      [facility]  [address]  [facility] [address]  Phone: [phone] Fax: [phone]      Initial Assessment (most recent)       Adult Discharge Assessment - 10/17/22 1003          Discharge Assessment    Assessment Type Discharge Planning Assessment     Confirmed/corrected address, phone number and insurance Yes     Reason --     Confirmed Demographics Correct on Facesheet     Source of Information family     If unable to respond/provide information was family/caregiver contacted? Yes     Contact Name/Number Called Garcia Howard (father)     When was your last doctors appointment? --   FRANK    Does patient/caregiver understand observation status --   FRANK    Communicated TATIANA with patient/caregiver Date not available/Unable to determine     Reason For Admission abdominal pain:  ESRD     Lives With alone   lives alone at times and with her father at times    Facility Arrived From: --   [facility]    Do you expect to return to your current  Denies known Latex allergy or symptoms of Latex sensitivity.  Medications reviewed and updated.  No primary care provider on file.  No chaperone needed      Sunita is a 30 year old female here for an obstetrics check.  She is      . Gestational age: 28w1d     She reports fetal movement  She denies contractions  She denies bleeding  She denies headaches  She denies edema  She denies abdominal pain  She denies rupture of membranes  She denies vision changes    See Prenatal Flowsheet for additional comments.   living situation? Yes   [facility]    Do you have help at home or someone to help you manage your care at home? No     Prior to hospitilization cognitive status: Alert/Oriented     Current cognitive status: Unable to Assess     Walking or Climbing Stairs Difficulty ambulation difficulty, requires equipment;stair climbing difficulty, assistance 1 person     Dressing/Bathing Difficulty none     Do you have any problems with: Errands/Grocery   father assists    Home Accessibility stairs within home     Stairs, Within Home, Primary 12     Equipment Currently Used at Home walker, rolling     Readmission within 30 days? Yes     Patient currently being followed by outpatient case management? Unable to determine (comments)     Do you currently have service(s) that help you manage your care at home? No     Do you take prescription medications? Yes     Do you have prescription coverage? Yes     Coverage Medicaid/ Healthy Blue     Do you have any problems affording any of your prescribed medications? No     Is the patient taking medications as prescribed? --   unknown    Who is going to help you get home at discharge? father, Garcia Howard     How do you get to doctors appointments? family or friend will provide     Are you on dialysis? Yes     Dialysis Name and Scheduled days [facility]:  Tuesday, Thursday, Saturday     Do you take coumadin? No     Discharge Plan A Home;Home with family     Discharge Plan B Home with family     DME Needed Upon Discharge  none     Discharge Plan discussed with: Parent(s)        Physical Activity    On average, how many days per week do you engage in moderate to strenuous exercise (like a brisk walk)? Patient refused     On average, how many minutes do you engage in exercise at this level? Patient refused        Financial Resource Strain    How hard is it for you to pay for the very basics like food, housing, medical care, and heating? Patient refused        Housing Stability    In the last 12 months, was  there a time when you were not able to pay the mortgage or rent on time? Patient refused     In the last 12 months, was there a time when you did not have a steady place to sleep or slept in a shelter (including now)? Patient refused        Transportation Needs    In the past 12 months, has lack of transportation kept you from medical appointments or from getting medications? Patient refused     In the past 12 months, has lack of transportation kept you from meetings, work, or from getting things needed for daily living? Patient refused        Food Insecurity    Within the past 12 months, you worried that your food would run out before you got the money to buy more. Patient refused     Within the past 12 months, the food you bought just didn't last and you didn't have money to get more. Patient refused        Stress    Do you feel stress - tense, restless, nervous, or anxious, or unable to sleep at night because your mind is troubled all the time - these days? Patient refused        Social Connections    In a typical week, how many times do you talk on the phone with family, friends, or neighbors? Patient refused     How often do you get together with friends or relatives? Patient refused     How often do you attend Advent or Taoist services? Patient refused     Are you , , , , never , or living with a partner? Patient refused        Alcohol Use    Q1: How often do you have a drink containing alcohol? Patient refused     Q2: How many drinks containing alcohol do you have on a typical day when you are drinking? Patient refused     Q3: How often do you have six or more drinks on one occasion? Patient refused

## 2022-10-17 NOTE — ED NOTES
PT RESTING IN BED IN NAD. WARM BLANKETS GIVEN AND LIGHTS OFF. MORNING MEDS GIVEN. NO OTHER NEEDS VOICED AT THIS TIME.

## 2022-10-17 NOTE — H&P
Count includes the [facility] Medicine History & Physical Examination   Patient Name: Tabby Howard  MRN: [id number]  Patient Class: Emergency   Admission Date: 10/16/2022  1:34 PM  Length of Stay: 0  Attending Physician:   Primary Care Provider: [facility]  Face-to-Face encounter date: 10/16/2022  Code Status: Full Code  MPOA:  Chief Complaint: Abdominal Pain (Dialysis T/Th/SA, Missed this TH & SA because she did not have a ride. Hx of gastroparesis but the pain is different per pt. EMS gave 2 doses of Fentanyl totaling 100mcg. )        Patient information was obtained from patient, past medical records and ER records.   HISTORY OF PRESENT ILLNESS:   Tabby Howard is a 32 y.o. old female who  has a past medical history of CKD (chronic kidney disease), stage IV (4/12/2022), Diabetes mellitus due to underlying condition with unspecified complications (4/12/2022), Gastroparesis (4/12/2022), Heart failure with preserved ejection fraction (4/12/2022), History of gastroesophageal reflux (GERD), History of supraventricular tachycardia, Hyperkalemia (7/7/2022), Hypertensive emergency (7/8/2022), Sickle cell trait (4/12/2022), and Type 2 diabetes mellitus.. The patient presented to [facility] on 10/16/2022 with a primary complaint of Abdominal Pain (Dialysis T/Th/SA, Missed this TH & SA because she did not have a ride. Hx of gastroparesis but the pain is different per pt. EMS gave 2 doses of Fentanyl totaling 100mcg. )  .   32-year-old female presents to emergency room with abdominal pain and missed hemodialysis sessions.  Past medical history significant for gastroparesis, end-stage renal disease requiring hemodialysis 3 times a week on Tuesday Thursdays and Saturdays, type 2 diabetes, hypertension, SVT, sickle cell trait, chronic diastolic heart failure, secondary hyperparathyroid disease.    The patient states she missed her last 2 dialysis sessions because she  did not have a ride to the hemodialysis unit.    The patient states since then she has been having abdominal pain with episodes of diarrhea.  The patient states this feels like her usual pain as she gets and she was told it was secondary to gastroparesis.    In the emergency room a CT of the abdomen and pelvis that showed mesenteric a edema most likely secondary to volume overload    Nephrology was consulted and agreed to see the patient in consult.    EN route to the emergency room EMS gave the patient 2 doses of fentanyl for a total of 100 mcg    REVIEW OF SYSTEMS:   10 Point Review of System was performed and was found to be negative except for that mentioned already in the HPI and   Review of Systems (Negative unless checked off)  Review of Systems   Constitutional:  Positive for malaise/fatigue.   HENT: Negative.     Eyes: Negative.    Respiratory:  Positive for shortness of breath.    Cardiovascular: Negative.    Gastrointestinal:  Positive for abdominal pain and nausea.   Genitourinary: Negative.    Musculoskeletal: Negative.    Skin: Negative.    Neurological: Negative.    Endo/Heme/Allergies: Negative.    Psychiatric/Behavioral: Negative.           PAST MEDICAL HISTORY:     Past Medical History:   Diagnosis Date    CKD (chronic kidney disease), stage IV 2022    Diabetes mellitus due to underlying condition with unspecified complications 2022    Gastroparesis 2022    Heart failure with preserved ejection fraction 2022    EF 55% on 3/22    History of gastroesophageal reflux (GERD)     History of supraventricular tachycardia     Hyperkalemia 2022    Hypertensive emergency 2022    Sickle cell trait 2022    Type 2 diabetes mellitus        PAST SURGICAL HISTORY:     Past Surgical History:   Procedure Laterality Date     SECTION      x 3    COLONOSCOPY      COLONOSCOPY N/A 2022    Procedure: COLONOSCOPY;  Surgeon: Jagdeep Cedeno MD;  Location: [facility];  Service:  Endoscopy;  Laterality: N/A;    ESOPHAGOGASTRODUODENOSCOPY N/A 10/18/2019    Procedure: ESOPHAGOGASTRODUODENOSCOPY (EGD);  Surgeon: Gianluca Mendez MD;  [facility];  Service: Endoscopy;  Laterality: N/A;    ESOPHAGOGASTRODUODENOSCOPY N/A 08/24/2022    Procedure: EGD (ESOPHAGOGASTRODUODENOSCOPY);  Surgeon: Micky Paredes III, MD;  Location: [facility] ENDO;  Service: Endoscopy;  Laterality: N/A;    LAPAROSCOPIC CHOLECYSTECTOMY N/A 07/30/2022    Procedure: CHOLECYSTECTOMY, LAPAROSCOPIC;  Surgeon: Grey Perez MD;  Location: [facility] OR;  Service: General;  Laterality: N/A;    PLACEMENT OF DUAL-LUMEN VASCULAR CATHETER Left 07/12/2022    Procedure: INSERTION-CATHETER-JOSEPH;  Surgeon: Dionte Gan MD;  Location: [facility] OR;  Service: General;  Laterality: Left;    PLACEMENT OF DUAL-LUMEN VASCULAR CATHETER Right 07/26/2022    Procedure: INSERTION-CATHETER-Hemosplit;  Surgeon: Dionte Gan MD;  Location: [facility] OR;  Service: General;  Laterality: Right;       ALLERGIES:   Penicillins    FAMILY HISTORY:     Family History   Problem Relation Age of Onset    Diabetes Mother     Diabetes Father        SOCIAL HISTORY:     Social History     Tobacco Use    Smoking status: Never    Smokeless tobacco: Never   Substance Use Topics    Alcohol use: No        Social History     Substance and Sexual Activity   Sexual Activity Not Currently    Partners: Male    Birth control/protection: I.U.D.        HOME MEDICATIONS:     Prior to Admission medications    Medication Sig Start Date End Date Taking? Authorizing Provider   amLODIPine (NORVASC) 10 MG tablet Take 1 tablet (10 mg total) by mouth once daily. 7/15/22 7/15/23 Yes Keegan Cody MD   apixaban (ELIQUIS) 5 mg Tab Take 1 tablet (5 mg total) by mouth 2 (two) times daily. For second month on. 8/24/22 11/22/22 Yes Qing Breen MD   carvediloL (COREG) 25 MG tablet Take 1 tablet (25 mg total) by mouth 2 (two) times daily. 7/27/22 10/25/22 Yes Qing Breen MD    cloNIDine 0.2 mg/24 hr td ptwk (CATAPRES) 0.2 mg/24 hr Place 1 patch onto the skin every 7 days. 7/31/22 7/31/23 Yes Qing Breen MD   FLUoxetine 20 MG capsule Take 1 capsule (20 mg total) by mouth once daily. 6/8/22 6/8/23 Yes Linda Cueto MD   furosemide (LASIX) 40 MG tablet Take 1 tablet (40 mg total) by mouth 2 (two) times daily. 7/27/22 7/27/23 Yes Qing Breen MD   hydrALAZINE (APRESOLINE) 100 MG tablet Take 1 tablet (100 mg total) by mouth every 8 (eight) hours. 7/27/22 10/25/22 Yes Qing Breen MD   insulin aspart U-100 (NOVOLOG) 100 unit/mL (3 mL) InPn pen Inject 1-10 Units into the skin before meals and at bedtime as needed (Hyperglycemia). 7/15/22  Yes Keegan Cody MD   isosorbide mononitrate (IMDUR) 60 MG 24 hr tablet Take 2 tablets (120 mg total) by mouth once daily. 7/15/22 7/15/23 Yes Keegan Cody MD   LANTUS U-100 INSULIN 100 unit/mL injection Inject 5 Units into the skin once daily. 5/25/22  Yes Historical Provider   levETIRAcetam (KEPPRA) 500 MG Tab Take 1 tablet (500 mg total) by mouth 2 (two) times daily. 6/7/22 6/7/23 Yes Linda Cueto MD   albuterol (PROVENTIL/VENTOLIN HFA) 90 mcg/actuation inhaler Inhale 2 puffs into the lungs every 6 (six) hours as needed for Wheezing. Rescue    Historical Provider   ARIPiprazole (ABILIFY) 5 MG Tab Take 5 mg by mouth once daily. 5/25/22   Historical Provider   desvenlafaxine succinate (PRISTIQ) 50 MG Tb24 Take 50 mg by mouth every other day. 5/26/22 5/26/23  Historical Provider   famotidine (PEPCID) 20 MG tablet Take 1 tablet (20 mg total) by mouth once daily. 7/16/22 8/23/22  Keegan Cody MD   ondansetron (ZOFRAN-ODT) 4 MG TbDL Take 1 tablet (4 mg total) by mouth every 8 (eight) hours as needed (nausea, vomiting). 8/9/22   Kaushik Patton MD   oxyCODONE-acetaminophen (PERCOCET) 5-325 mg per tablet Take 1 tablet by mouth every 6 (six) hours as needed for Pain.  Patient not taking: No sig reported 8/5/22    "Keegan Cody MD   pantoprazole (PROTONIX) 40 MG tablet Take 1 tablet (40 mg total) by mouth once daily. 8/26/22 9/25/22  Amalia Jefferson MD   polyethylene glycol (GLYCOLAX) 17 gram/dose powder Take 17 g by mouth once daily. 8/5/22   Keegan Cody MD   atenoloL (TENORMIN) 50 MG tablet Take 1 tablet (50 mg total) by mouth every other day. 7/17/22 7/27/22  Keegan Cody MD   omeprazole (PRILOSEC) 20 MG capsule Take 2 capsules (40 mg total) by mouth once daily. for 10 days 8/9/22 8/26/22  Kaushik Patton MD   torsemide (DEMADEX) 20 MG Tab Take 1 tablet (20 mg total) by mouth 2 (two) times a day.  Patient taking differently: Take 20 mg by mouth once daily. 6/11/22 7/15/22  Gurmeet Barrera MD         PHYSICAL EXAM:   BP (!) 152/93   Pulse 89   Temp 97.9 °F (36.6 °C) (Oral)   Resp 13   Ht 5' 2" (1.575 m)   Wt 68 kg (150 lb)   SpO2 96%   BMI 27.44 kg/m²   Vitals Reviewed  General appearance: Well-developed, well-nourished female in no apparent distress.  Skin: No Rash.   Neuro: Motor and sensory exams grossly intact. Good tone. Power in all 4 extremities 5/5.   HENT: Atraumatic head. Moist mucous membranes of oral cavity.  Eyes: Normal extraocular movements.   Neck: Supple. No evidence of lymphadenopathy. No thyroidomegaly.  Lungs: Clear to auscultation bilaterally. No wheezing present.   Heart: Regular rate and rhythm. S1 and S2 present with no murmurs/gallop/rub. No pedal edema. No JVD present.   Abdomen: Soft, non-distended, non-tender. No rebound tenderness/guarding. No masses or organomegaly. Bowel sounds are normal. Bladder is not palpable.   Extremities: No cyanosis, clubbing, or edema.  Psych/mental status: Alert and oriented. Cooperative. Responds appropriately to questions.   EMERGENCY DEPARTMENT LABS AND IMAGING:   Following labs were Reviewed   Recent Labs   Lab 10/16/22  1428   WBC 4.68   HGB 10.6*   HCT 30.6*   *   CALCIUM 8.5*   ALBUMIN 2.7*   PROT 6.3   * "   K 5.3*   CO2 23      BUN 82*   CREATININE 6.5*   ALKPHOS 135   ALT 16   AST 15   BILITOT 1.5*         BMP:   Recent Labs   Lab 10/16/22  1428   *   *   K 5.3*      CO2 23   BUN 82*   CREATININE 6.5*   CALCIUM 8.5*   MG 2.5   , CMP   Recent Labs   Lab 10/16/22  1428   *   K 5.3*      CO2 23   *   BUN 82*   CREATININE 6.5*   CALCIUM 8.5*   PROT 6.3   ALBUMIN 2.7*   BILITOT 1.5*   ALKPHOS 135   AST 15   ALT 16   ANIONGAP 7*   , CBC   Recent Labs   Lab 10/16/22  1428   WBC 4.68   HGB 10.6*   HCT 30.6*   *   , INR   Lab Results   Component Value Date    INR 1.1 08/23/2022    INR 1.0 07/28/2022    INR 1.0 03/03/2020   , Lipid Panel No results found for: CHOL, HDL, LDLCALC, TRIG, CHOLHDL, Troponin No results for input(s): TROPONINI in the last 168 hours., A1C:   Recent Labs   Lab 05/15/22  1954 07/22/22  1653 08/24/22  0218   HGBA1C 5.8* 6.0* 6.2   , and All labs within the past 24 hours have been reviewed  Microbiology Results (last 7 days)       Procedure Component Value Units Date/Time    Urine culture [[id number]] Collected: 10/16/22 1736    Order Status: No result Specimen: Urine Updated: 10/16/22 1844          CT Abdomen Pelvis With Contrast   Final Result      X-Ray Abdomen AP 1 View    Result Date: 10/6/2022  EXAMINATION: XR ABDOMEN AP 1 VIEW CLINICAL HISTORY: abdominal pain; TECHNIQUE: AP View(s) of the abdomen was performed. COMPARISON: 08/05/2022 FINDINGS: Cholecystectomy clips.  11 mm oval metallic density left upper quadrant of the abdomen not reliably localized and likely snap or similar structure extrinsic to the patient.  Curvilinear calcifications cephalad to this is consistent calcification in the splenic artery.  Surgical clips also seen in the pelvis and vascular calcifications in the pelvis.  Gas and stool not colon.  No gross free intraperitoneal air on the supine film.  No abnormal distention of bowel.     No acute findings.  Findings as detailed  above. Electronically signed by: Sunshine Awan MD Date:    10/06/2022 Time:    16:47    CT Abdomen Pelvis With Contrast    Result Date: 10/16/2022  CMS MANDATED QUALITY DATA - CT RADIATION  436 All CT scans at this facility utilize dose modulation, iterative reconstruction, and/or weight based dosing when appropriate to reduce radiation dose to as low as reasonably achievable. CT ABDOMEN PELVIS WITH IV CONTRAST CLINICAL HISTORY: 32 years Female Abdominal pain, acute, nonlocalized COMPARISON: CT abdomen and pelvis August 25, 2022 FINDINGS: Images through the lower thorax demonstrate scattered groundglass opacities bilaterally. Bone window images show no acute or aggressive osseous abnormality. Diffuse body wall edema. No focal hepatic lesion. Gallbladder is absent. No bile duct dilation. Portal vein is patent. Spleen appears normal. Pancreas is unremarkable. No adrenal lesion. No renal lesion or hydronephrosis. Diminished perfusion of both kidneys suggesting renal dysfunction. Ureters are normal in caliber. Urinary bladder is collapsed. Diffuse gastric wall thickening. No evidence of small bowel obstruction. Diffuse colonic wall thickening. Diffuse mesenteric edema and small volume free fluid within the abdomen and pelvis. IMPRESSION: Diffuse body wall and mesenteric edema. Small volume free fluid in the abdomen and pelvis. Diminished perfusion of both kidneys suggesting renal dysfunction. Diffuse edema/wall thickening of the stomach and colon, most likely on the basis of volume overload considering concurrent findings. Status post cholecystectomy. Electronically signed by:  Von Bland MD  10/16/2022 6:32 PM CDT Workstation: [id number]          ASSESSMENT & PLAN:   Tabby Howard is a 32 y.o. female admitted for        Generalized abdominal pain  Unspecified.  CT abdomen and pelvis without contrast results reviewed, no acute intra-abdominal process noted. Diffused edema s/t volume overload  Symptoms are likely  related to underlying gastroparesis   Continue supportive care   with intravenous narcotics-sparing use.    Use antiemetics as needed.      2. ESRD (end stage renal disease)  hemodialysis is being arranged by Dr. Figueroa.    Follow Nephrology recommendation      3. HTN (hypertension)  Continue use of home medications to manage  Use intravenous hydralazine 10 mg IV q.6 hours p.r.n. for systolic blood pressure above 160 mmHg.        4. H/O Deep vein thrombosis (DVT) of non-extremity vein  Continue Eliquis as before.        5. Seizure  Continue Keppra u  seizure precautions.        6. Diabetes mellitus due to underlying condition with unspecified complications  Low dose NovoLog insulin sliding scale  Hypoglycemic protocol  Diabetic cardiac renal diet once eating     7. Gastroparesis  Noted.  Continue supportive care with sparing use of narcotics.  Continue use of antiemetics and proton pump inhibitor.                 DVT prophylaxis:  .  On Eliquis.              DVT Prophylaxis: will be placed on Eliquis for DVT prophylaxis and will be advised to be as mobile as possible and sit in a chair as tolerated.   _______________________________________________________________  Face-to-Face encounter date: 10/16/2022  Encounter included review of the medical records, interviewing and examining the patient face-to-face, discussion with family and other health care providers including emergency medicine physician, admission orders, interpreting lab/test results and formulating a plan of care.   Medical Decision Making during this encounter was  [_] Low Complexity  [_] Moderate Complexity  [x] High Complexity  _________________________________________________________________________________    INPATIENT LIST OF MEDICATIONS     Current Facility-Administered Medications:     cefTRIAXone (ROCEPHIN) 1 g/50 mL D5W IVPB, 1 g, Intravenous, ED 1 Time, Steven Mascorro PA-C, Last Rate: 100 mL/hr at 10/16/22 1952, 1 g at 10/16/22 1952     [START ON 10/17/2022] epoetin teresa-epbx injection 3,400 Units, 50 Units/kg, Intravenous, Every Mon, Wed, Fri, Hugh Figueroa MD    mupirocin 2 % ointment, , Nasal, BID, Hugh Figueroa MD, Given at 10/16/22 1952    Current Outpatient Medications:     amLODIPine (NORVASC) 10 MG tablet, Take 1 tablet (10 mg total) by mouth once daily., Disp: 30 tablet, Rfl: 11    apixaban (ELIQUIS) 5 mg Tab, Take 1 tablet (5 mg total) by mouth 2 (two) times daily. For second month on., Disp: 60 tablet, Rfl: 2    carvediloL (COREG) 25 MG tablet, Take 1 tablet (25 mg total) by mouth 2 (two) times daily., Disp: 60 tablet, Rfl: 2    cloNIDine 0.2 mg/24 hr td ptwk (CATAPRES) 0.2 mg/24 hr, Place 1 patch onto the skin every 7 days., Disp: 4 patch, Rfl: 2    FLUoxetine 20 MG capsule, Take 1 capsule (20 mg total) by mouth once daily., Disp: 30 capsule, Rfl: 1    furosemide (LASIX) 40 MG tablet, Take 1 tablet (40 mg total) by mouth 2 (two) times daily., Disp: 60 tablet, Rfl: 0    hydrALAZINE (APRESOLINE) 100 MG tablet, Take 1 tablet (100 mg total) by mouth every 8 (eight) hours., Disp: 90 tablet, Rfl: 2    insulin aspart U-100 (NOVOLOG) 100 unit/mL (3 mL) InPn pen, Inject 1-10 Units into the skin before meals and at bedtime as needed (Hyperglycemia)., Disp: 3 mL, Rfl: 3    isosorbide mononitrate (IMDUR) 60 MG 24 hr tablet, Take 2 tablets (120 mg total) by mouth once daily., Disp: 30 tablet, Rfl: 1    LANTUS U-100 INSULIN 100 unit/mL injection, Inject 5 Units into the skin once daily., Disp: , Rfl:     levETIRAcetam (KEPPRA) 500 MG Tab, Take 1 tablet (500 mg total) by mouth 2 (two) times daily., Disp: 60 tablet, Rfl: 1    albuterol (PROVENTIL/VENTOLIN HFA) 90 mcg/actuation inhaler, Inhale 2 puffs into the lungs every 6 (six) hours as needed for Wheezing. Rescue, Disp: , Rfl:     ARIPiprazole (ABILIFY) 5 MG Tab, Take 5 mg by mouth once daily., Disp: , Rfl:     desvenlafaxine succinate (PRISTIQ) 50 MG Tb24, Take 50 mg by mouth every other day., Disp:  , Rfl:     famotidine (PEPCID) 20 MG tablet, Take 1 tablet (20 mg total) by mouth once daily., Disp: 30 tablet, Rfl: 0    ondansetron (ZOFRAN-ODT) 4 MG TbDL, Take 1 tablet (4 mg total) by mouth every 8 (eight) hours as needed (nausea, vomiting)., Disp: 12 tablet, Rfl: 0    oxyCODONE-acetaminophen (PERCOCET) 5-325 mg per tablet, Take 1 tablet by mouth every 6 (six) hours as needed for Pain. (Patient not taking: No sig reported), Disp: 20 tablet, Rfl: 0    pantoprazole (PROTONIX) 40 MG tablet, Take 1 tablet (40 mg total) by mouth once daily., Disp: 30 tablet, Rfl: 0    polyethylene glycol (GLYCOLAX) 17 gram/dose powder, Take 17 g by mouth once daily., Disp: 510 g, Rfl: 1      Scheduled Meds:   cefTRIAXone (ROCEPHIN) IVPB  1 g Intravenous ED 1 Time    [START ON 10/17/2022] epoetin teresa-epbx  50 Units/kg Intravenous Every Mon, Wed, Fri    mupirocin   Nasal BID     Continuous Infusions:  PRN Meds:.      Dilia Maldonado  [facility] Hospitalist NP  10/16/2022

## 2022-10-17 NOTE — PROGRESS NOTES
[facility] Medicine  Progress Note    Patient name: Tabby Howard  MRN: [id number]  Admit Date: 10/16/2022   LOS: 0 days     SUBJECTIVE:     Principal problem: Missed dialysis, Cdiff    Interval History:  Patient is sleeping, seen on dialysis.  She says she is still having abdominal pain, episode of diarrhea while in room.  Cdiff +, starting treatment.  Vitals stable.      Scheduled Meds:   amLODIPine  10 mg Oral Daily    apixaban  5 mg Oral BID    carvediloL  25 mg Oral BID    cloNIDine 0.2 mg/24 hr td ptwk  1 patch Transdermal Q7 Days    epoetin teresa-epbx  50 Units/kg Intravenous Every Mon, Wed, Fri    fidaxomicin  200 mg Oral BID    FLUoxetine  20 mg Oral Daily    furosemide  40 mg Oral BID    hydrALAZINE  100 mg Oral Q8H    insulin detemir U-100  5 Units Subcutaneous Daily    isosorbide mononitrate  120 mg Oral Daily    levETIRAcetam  500 mg Oral BID    mupirocin   Nasal BID    pantoprazole  40 mg Oral Before breakfast     Continuous Infusions:  PRN Meds:acetaminophen, dextrose 10%, dextrose 10%, glucagon (human recombinant), glucose, glucose, heparin (porcine), insulin aspart U-100, melatonin, morphine, morphine, ondansetron, sodium chloride 0.9%    Review of patient's allergies indicates:   Allergen Reactions    Penicillins Hives       Review of Systems: As per interval history    OBJECTIVE:     Vital Signs (Most Recent)  Temp: 97.7 °F (36.5 °C) (10/17/22 1225)  Pulse: 86 (10/17/22 1225)  Resp: 16 (10/17/22 1225)  BP: (!) 146/92 (10/17/22 1337)  SpO2: 98 % (10/17/22 1225)    Vital Signs Range (Last 24H):  Temp:  [97 °F (36.1 °C)-97.7 °F (36.5 °C)]   Pulse:  []   Resp:  [1-51]   BP: (114-162)/()   SpO2:  [92 %-100 %]     I & O (Last 24H):  Intake/Output Summary (Last 24 hours) at 10/17/2022 1421  Last data filed at 10/17/2022 1225  Gross per 24 hour   Intake 500 ml   Output 3500 ml   Net -3000 ml       Physical Exam:  General: Patient resting comfortably in no acute  distress.  Eyes: No conjunctival injection. No scleral icterus.  ENT: Hearing grossly intact. No discharge from ears. No nasal discharge.   Neck: Supple, trachea midline. No JVD  CVS: RRR. No LE edema BL  Lungs:  No tachypnea or accessory muscle use.  Clear to auscultation bilaterally  Abdomen:  Soft, nontender and nondistended.  No organomegaly  Neuro: AOx3. Moves all extremities. Follows commands. Responds appropriately   Skin:  No rash or erythema noted    Laboratory:  I have reviewed all pertinent lab results within the past 24 hours.    Diagnostic Results:  Labs: Reviewed  ECG: Reviewed  X-Ray: Reviewed    ASSESSMENT/PLAN:     32F with ESRD on HD TTS, gastroparesis, type 2 diabetes, hypertension, SVT, sickle cell trait, chronic diastolic heart failure presents to emergency room with abdominal pain and missed hemodialysis sessions found to be C. Diff +.      Active Hospital Problems    Diagnosis  POA    ESRD (end stage renal disease) [N18.6]  Yes    Generalized abdominal pain [R10.84]  Yes      Resolved Hospital Problems   No resolved problems to display.         Plan:   Generalized abdominal pain  Unspecified.  CT abdomen and pelvis without contrast results reviewed, no acute intra-abdominal process noted. Diffused edema s/t volume overload  C.Diff+  Start fidaxomycin 200 mg PO bid x 10 days  Continue supportive care     Use antiemetics as needed.        ESRD (end stage renal disease)  hemodialysis in process  Follow Nephrology recommendation        HTN (hypertension)  Continue use of home medications to manage  Use intravenous hydralazine 10 mg IV q.6 hours p.r.n. for systolic blood pressure above 160 mmHg.        H/O Deep vein thrombosis (DVT) of non-extremity vein  Continue Eliquis as before.        Seizure  Continue Keppra u  seizure precautions.     Diabetes mellitus due to underlying condition with unspecified complications  Low dose NovoLog insulin sliding scale  Hypoglycemic protocol  Diabetic cardiac  renal diet once eating     Gastroparesis  Noted.  Continue supportive care with sparing use of narcotics.  Continue use of antiemetics and proton pump inhibitor.                 DVT prophylaxis:  .  On Eliquis.                     VTE Risk Mitigation (From admission, onward)           Ordered     heparin (porcine) injection 4,000 Units  As needed (PRN)         10/17/22 0850     apixaban tablet 5 mg  2 times daily         10/16/22 2018     Reason for No Pharmacological VTE Prophylaxis  Once        Question:  Reasons:  Answer:  Already adequately anticoagulated on oral Anticoagulants    10/16/22 2018     IP VTE HIGH RISK PATIENT  Once         10/16/22 2018     Place sequential compression device  Until discontinued         10/16/22 2018                        Department Hospital Medicine  [facility]  Isai Grady MD  Date of service: 10/17/2022

## 2022-10-18 VITALS
HEIGHT: 62 IN | RESPIRATION RATE: 18 BRPM | DIASTOLIC BLOOD PRESSURE: 80 MMHG | HEART RATE: 88 BPM | BODY MASS INDEX: 24.42 KG/M2 | OXYGEN SATURATION: 97 % | TEMPERATURE: 97 F | WEIGHT: 132.69 LBS | SYSTOLIC BLOOD PRESSURE: 127 MMHG

## 2022-10-18 PROBLEM — R10.84 GENERALIZED ABDOMINAL PAIN: Status: RESOLVED | Noted: 2018-09-15 | Resolved: 2022-10-18

## 2022-10-18 PROBLEM — A49.8 CLOSTRIDIUM DIFFICILE INFECTION: Status: ACTIVE | Noted: 2022-10-18

## 2022-10-18 LAB
ALBUMIN SERPL BCP-MCNC: 2.6 G/DL (ref 3.5–5.2)
ALP SERPL-CCNC: 123 U/L (ref 55–135)
ALT SERPL W/O P-5'-P-CCNC: 13 U/L (ref 10–44)
ANION GAP SERPL CALC-SCNC: 12 MMOL/L (ref 8–16)
AST SERPL-CCNC: 12 U/L (ref 10–40)
BASOPHILS # BLD AUTO: 0.03 K/UL (ref 0–0.2)
BASOPHILS NFR BLD: 0.6 % (ref 0–1.9)
BILIRUB SERPL-MCNC: 1.3 MG/DL (ref 0.1–1)
BUN SERPL-MCNC: 37 MG/DL (ref 6–20)
CALCIUM SERPL-MCNC: 8.8 MG/DL (ref 8.7–10.5)
CHLORIDE SERPL-SCNC: 105 MMOL/L (ref 95–110)
CO2 SERPL-SCNC: 23 MMOL/L (ref 23–29)
CREAT SERPL-MCNC: 4.5 MG/DL (ref 0.5–1.4)
DIFFERENTIAL METHOD: ABNORMAL
EOSINOPHIL # BLD AUTO: 0.1 K/UL (ref 0–0.5)
EOSINOPHIL NFR BLD: 1.8 % (ref 0–8)
ERYTHROCYTE [DISTWIDTH] IN BLOOD BY AUTOMATED COUNT: 13.4 % (ref 11.5–14.5)
EST. GFR  (NO RACE VARIABLE): 12.5 ML/MIN/1.73 M^2
GLUCOSE SERPL-MCNC: 174 MG/DL (ref 70–110)
GLUCOSE SERPL-MCNC: 179 MG/DL (ref 70–110)
GLUCOSE SERPL-MCNC: 196 MG/DL (ref 70–110)
GLUCOSE SERPL-MCNC: 202 MG/DL (ref 70–110)
GLUCOSE SERPL-MCNC: 225 MG/DL (ref 70–110)
HCT VFR BLD AUTO: 38.2 % (ref 37–48.5)
HGB BLD-MCNC: 12.7 G/DL (ref 12–16)
IMM GRANULOCYTES # BLD AUTO: 0.02 K/UL (ref 0–0.04)
IMM GRANULOCYTES NFR BLD AUTO: 0.4 % (ref 0–0.5)
LYMPHOCYTES # BLD AUTO: 0.9 K/UL (ref 1–4.8)
LYMPHOCYTES NFR BLD: 18.5 % (ref 18–48)
MCH RBC QN AUTO: 28.9 PG (ref 27–31)
MCHC RBC AUTO-ENTMCNC: 33.2 G/DL (ref 32–36)
MCV RBC AUTO: 87 FL (ref 82–98)
MONOCYTES # BLD AUTO: 0.5 K/UL (ref 0.3–1)
MONOCYTES NFR BLD: 9.2 % (ref 4–15)
NEUTROPHILS # BLD AUTO: 3.4 K/UL (ref 1.8–7.7)
NEUTROPHILS NFR BLD: 69.5 % (ref 38–73)
NRBC BLD-RTO: 0 /100 WBC
PLATELET # BLD AUTO: 135 K/UL (ref 150–450)
PMV BLD AUTO: 10.4 FL (ref 9.2–12.9)
POTASSIUM SERPL-SCNC: 4 MMOL/L (ref 3.5–5.1)
PROT SERPL-MCNC: 6.1 G/DL (ref 6–8.4)
RBC # BLD AUTO: 4.39 M/UL (ref 4–5.4)
SODIUM SERPL-SCNC: 140 MMOL/L (ref 136–145)
WBC # BLD AUTO: 4.87 K/UL (ref 3.9–12.7)

## 2022-10-18 PROCEDURE — 25000003 PHARM REV CODE 250: Performed by: HOSPITALIST

## 2022-10-18 PROCEDURE — 25000003 PHARM REV CODE 250: Performed by: NURSE PRACTITIONER

## 2022-10-18 PROCEDURE — 25000003 PHARM REV CODE 250: Performed by: STUDENT IN AN ORGANIZED HEALTH CARE EDUCATION/TRAINING PROGRAM

## 2022-10-18 PROCEDURE — 63600175 PHARM REV CODE 636 W HCPCS: Performed by: NURSE PRACTITIONER

## 2022-10-18 PROCEDURE — 82962 GLUCOSE BLOOD TEST: CPT

## 2022-10-18 PROCEDURE — G0378 HOSPITAL OBSERVATION PER HR: HCPCS

## 2022-10-18 PROCEDURE — 36415 COLL VENOUS BLD VENIPUNCTURE: CPT | Performed by: NURSE PRACTITIONER

## 2022-10-18 PROCEDURE — 85025 COMPLETE CBC W/AUTO DIFF WBC: CPT | Performed by: NURSE PRACTITIONER

## 2022-10-18 PROCEDURE — 63600175 PHARM REV CODE 636 W HCPCS: Performed by: INTERNAL MEDICINE

## 2022-10-18 PROCEDURE — 96372 THER/PROPH/DIAG INJ SC/IM: CPT | Performed by: NURSE PRACTITIONER

## 2022-10-18 PROCEDURE — 80053 COMPREHEN METABOLIC PANEL: CPT | Performed by: NURSE PRACTITIONER

## 2022-10-18 PROCEDURE — 96376 TX/PRO/DX INJ SAME DRUG ADON: CPT | Mod: 59

## 2022-10-18 RX ORDER — HYDROCODONE BITARTRATE AND ACETAMINOPHEN 10; 325 MG/1; MG/1
1 TABLET ORAL EVERY 6 HOURS PRN
Status: DISCONTINUED | OUTPATIENT
Start: 2022-10-18 | End: 2022-10-18 | Stop reason: HOSPADM

## 2022-10-18 RX ORDER — ONDANSETRON 4 MG/1
4 TABLET, ORALLY DISINTEGRATING ORAL EVERY 6 HOURS PRN
Status: DISCONTINUED | OUTPATIENT
Start: 2022-10-18 | End: 2022-10-18 | Stop reason: HOSPADM

## 2022-10-18 RX ADMIN — APIXABAN 5 MG: 5 TABLET, FILM COATED ORAL at 08:10

## 2022-10-18 RX ADMIN — HYDROCODONE BITARTRATE AND ACETAMINOPHEN 1 TABLET: 10; 325 TABLET ORAL at 04:10

## 2022-10-18 RX ADMIN — INSULIN DETEMIR 5 UNITS: 100 INJECTION, SOLUTION SUBCUTANEOUS at 08:10

## 2022-10-18 RX ADMIN — LACTOBACILLUS TAB 4 TABLET: TAB at 11:10

## 2022-10-18 RX ADMIN — FIDAXOMICIN 200 MG: 200 TABLET, FILM COATED ORAL at 08:10

## 2022-10-18 RX ADMIN — AMLODIPINE BESYLATE 10 MG: 5 TABLET ORAL at 08:10

## 2022-10-18 RX ADMIN — ONDANSETRON 4 MG: 2 INJECTION INTRAMUSCULAR; INTRAVENOUS at 07:10

## 2022-10-18 RX ADMIN — CARVEDILOL 25 MG: 12.5 TABLET, FILM COATED ORAL at 08:10

## 2022-10-18 RX ADMIN — FUROSEMIDE 40 MG: 40 TABLET ORAL at 08:10

## 2022-10-18 RX ADMIN — ONDANSETRON 4 MG: 4 TABLET, ORALLY DISINTEGRATING ORAL at 04:10

## 2022-10-18 RX ADMIN — FLUOXETINE 20 MG: 20 CAPSULE ORAL at 08:10

## 2022-10-18 RX ADMIN — LEVETIRACETAM 500 MG: 500 TABLET, FILM COATED ORAL at 08:10

## 2022-10-18 RX ADMIN — ISOSORBIDE MONONITRATE 120 MG: 60 TABLET, EXTENDED RELEASE ORAL at 08:10

## 2022-10-18 RX ADMIN — PANTOPRAZOLE SODIUM 40 MG: 40 TABLET, DELAYED RELEASE ORAL at 08:10

## 2022-10-18 RX ADMIN — HYDROMORPHONE HYDROCHLORIDE 1 MG: 1 INJECTION, SOLUTION INTRAMUSCULAR; INTRAVENOUS; SUBCUTANEOUS at 07:10

## 2022-10-18 NOTE — NURSING
Discharge paperwork reviewed with patient. Tele removed. PIV x1 intact, removed. No complaints or issues at this time. Pt wheelchair transported to personal car.

## 2022-10-18 NOTE — PLAN OF CARE
Patient cleared to discharge by case management.  Patient discharging home self care.       10/18/22 1413   Final Note   Assessment Type Final Discharge Note   Anticipated Discharge Disposition Home   Post-Acute Status   Discharge Delays None known at this time

## 2022-10-18 NOTE — PLAN OF CARE
Problem: Device-Related Complication Risk (Hemodialysis)  Goal: Safe, Effective Therapy Delivery  Outcome: Ongoing, Progressing     Problem: Hemodynamic Instability (Hemodialysis)  Goal: Effective Tissue Perfusion  Outcome: Ongoing, Progressing     Problem: Infection (Hemodialysis)  Goal: Absence of Infection Signs and Symptoms  Outcome: Ongoing, Progressing     Problem: Adult Inpatient Plan of Care  Goal: Plan of Care Review  Outcome: Ongoing, Progressing  Goal: Patient-Specific Goal (Individualized)  Outcome: Ongoing, Progressing  Goal: Absence of Hospital-Acquired Illness or Injury  Outcome: Ongoing, Progressing  Goal: Optimal Comfort and Wellbeing  Outcome: Ongoing, Progressing  Goal: Readiness for Transition of Care  Outcome: Ongoing, Progressing     Problem: Diabetes Comorbidity  Goal: Blood Glucose Level Within Targeted Range  Outcome: Ongoing, Progressing     Problem: Fluid and Electrolyte Imbalance (Acute Kidney Injury/Impairment)  Goal: Fluid and Electrolyte Balance  Outcome: Ongoing, Progressing     Problem: Oral Intake Inadequate (Acute Kidney Injury/Impairment)  Goal: Optimal Nutrition Intake  Outcome: Ongoing, Progressing     Problem: Renal Function Impairment (Acute Kidney Injury/Impairment)  Goal: Effective Renal Function  Outcome: Ongoing, Progressing     Problem: Fluid Imbalance (Pneumonia)  Goal: Fluid Balance  Outcome: Ongoing, Progressing     Problem: Infection (Pneumonia)  Goal: Resolution of Infection Signs and Symptoms  Outcome: Ongoing, Progressing     Problem: Respiratory Compromise (Pneumonia)  Goal: Effective Oxygenation and Ventilation  Outcome: Ongoing, Progressing     Problem: Infection  Goal: Absence of Infection Signs and Symptoms  Outcome: Ongoing, Progressing

## 2022-10-18 NOTE — CARE UPDATE
No chair availability at the [facility].  Patient father informed via phone that the patient will have to continue HD treatments at the [facility] location until a chair become available at the [facility].  MD aware via secure chat.

## 2022-10-18 NOTE — PROGRESS NOTES
Nephrology Consult Note        Patient Name: Tabby Howard  MRN: [id number]    Patient Class: OP- Observation   Admission Date: 10/16/2022  Length of Stay: 0 days  Date of Service: 10/18/2022    Attending Physician: Amalia Jefferson MD  Primary Care Provider: [facility]    Reason for Consult: esrd/anemia/htn/shpt/abdominal pain    SUBJECTIVE:     HPI: 32F with ESRD on HD TTS, gastroparesis, type 2 diabetes, hypertension, SVT, sickle cell trait, chronic diastolic heart failure presents to emergency room with abdominal pain and missed hemodialysis sessions. She missed her last 2 dialysis sessions because she did not have a ride to the hemodialysis unit. Since then, she has been having abdominal pain with episodes of diarrhea. In the emergency room, a CT of the abdomen and pelvis showed mesenteric edema.    10/17 HD today.  10/18 VSS. No new complains. HD in AM. Transportation issues, she may need to transfer to a clinic in [facility] if that's where she lives currently.    Past Medical History:   Diagnosis Date    CKD (chronic kidney disease), stage IV 2022    Diabetes mellitus due to underlying condition with unspecified complications 2022    Gastroparesis 2022    Heart failure with preserved ejection fraction 2022    EF 55% on 3/22    History of gastroesophageal reflux (GERD)     History of supraventricular tachycardia     Hyperkalemia 2022    Hypertensive emergency 2022    Sickle cell trait 2022    Type 2 diabetes mellitus      Past Surgical History:   Procedure Laterality Date     SECTION      x 3    COLONOSCOPY      COLONOSCOPY N/A 2022    Procedure: COLONOSCOPY;  Surgeon: Jagdeep Cedeno MD;  Location: [facility];  Service: Endoscopy;  Laterality: N/A;    ESOPHAGOGASTRODUODENOSCOPY N/A 10/18/2019    Procedure: ESOPHAGOGASTRODUODENOSCOPY (EGD);  Surgeon: Gianluca Mendez MD;  Location: [facility];  Service: Endoscopy;  Laterality: N/A;     ESOPHAGOGASTRODUODENOSCOPY N/A 08/24/2022    Procedure: EGD (ESOPHAGOGASTRODUODENOSCOPY);  Surgeon: Micky Paredes III, MD;  Location: [facility];  Service: Endoscopy;  Laterality: N/A;    LAPAROSCOPIC CHOLECYSTECTOMY N/A 07/30/2022    Procedure: CHOLECYSTECTOMY, LAPAROSCOPIC;  Surgeon: Grey Perez MD;  Location: [facility];  Service: General;  Laterality: N/A;    PLACEMENT OF DUAL-LUMEN VASCULAR CATHETER Left 07/12/2022    Procedure: INSERTION-CATHETER-JOSEPH;  Surgeon: Dionte Gan MD;  Location: [facility] OR;  Service: General;  Laterality: Left;    PLACEMENT OF DUAL-LUMEN VASCULAR CATHETER Right 07/26/2022    Procedure: INSERTION-CATHETER-Hemosplit;  Surgeon: Dionte Gan MD;  Location: [facility] OR;  Service: General;  Laterality: Right;     Family History   Problem Relation Age of Onset    Diabetes Mother     Diabetes Father      Social History     Tobacco Use    Smoking status: Never    Smokeless tobacco: Never   Substance Use Topics    Alcohol use: No    Drug use: No       Review of patient's allergies indicates:   Allergen Reactions    Penicillins Hives       Outpatient meds:  No current facility-administered medications on file prior to encounter.     Current Outpatient Medications on File Prior to Encounter   Medication Sig Dispense Refill    amLODIPine (NORVASC) 10 MG tablet Take 1 tablet (10 mg total) by mouth once daily. 30 tablet 11    apixaban (ELIQUIS) 5 mg Tab Take 1 tablet (5 mg total) by mouth 2 (two) times daily. For second month on. 60 tablet 2    carvediloL (COREG) 25 MG tablet Take 1 tablet (25 mg total) by mouth 2 (two) times daily. 60 tablet 2    cloNIDine 0.2 mg/24 hr td ptwk (CATAPRES) 0.2 mg/24 hr Place 1 patch onto the skin every 7 days. 4 patch 2    FLUoxetine 20 MG capsule Take 1 capsule (20 mg total) by mouth once daily. 30 capsule 1    furosemide (LASIX) 40 MG tablet Take 1 tablet (40 mg total) by mouth 2 (two) times daily. 60 tablet 0    hydrALAZINE (APRESOLINE) 100 MG tablet  Take 1 tablet (100 mg total) by mouth every 8 (eight) hours. 90 tablet 2    insulin aspart U-100 (NOVOLOG) 100 unit/mL (3 mL) InPn pen Inject 1-10 Units into the skin before meals and at bedtime as needed (Hyperglycemia). 3 mL 3    isosorbide mononitrate (IMDUR) 60 MG 24 hr tablet Take 2 tablets (120 mg total) by mouth once daily. 30 tablet 1    LANTUS U-100 INSULIN 100 unit/mL injection Inject 5 Units into the skin once daily.      levETIRAcetam (KEPPRA) 500 MG Tab Take 1 tablet (500 mg total) by mouth 2 (two) times daily. 60 tablet 1    albuterol (PROVENTIL/VENTOLIN HFA) 90 mcg/actuation inhaler Inhale 2 puffs into the lungs every 6 (six) hours as needed for Wheezing. Rescue      ARIPiprazole (ABILIFY) 5 MG Tab Take 5 mg by mouth once daily.      desvenlafaxine succinate (PRISTIQ) 50 MG Tb24 Take 50 mg by mouth every other day.      famotidine (PEPCID) 20 MG tablet Take 1 tablet (20 mg total) by mouth once daily. 30 tablet 0    ondansetron (ZOFRAN-ODT) 4 MG TbDL Take 1 tablet (4 mg total) by mouth every 8 (eight) hours as needed (nausea, vomiting). 12 tablet 0    oxyCODONE-acetaminophen (PERCOCET) 5-325 mg per tablet Take 1 tablet by mouth every 6 (six) hours as needed for Pain. (Patient not taking: No sig reported) 20 tablet 0    pantoprazole (PROTONIX) 40 MG tablet Take 1 tablet (40 mg total) by mouth once daily. 30 tablet 0    polyethylene glycol (GLYCOLAX) 17 gram/dose powder Take 17 g by mouth once daily. 510 g 1    [DISCONTINUED] atenoloL (TENORMIN) 50 MG tablet Take 1 tablet (50 mg total) by mouth every other day. 45 tablet 3    [DISCONTINUED] omeprazole (PRILOSEC) 20 MG capsule Take 2 capsules (40 mg total) by mouth once daily. for 10 days 20 capsule 0    [DISCONTINUED] torsemide (DEMADEX) 20 MG Tab Take 1 tablet (20 mg total) by mouth 2 (two) times a day. (Patient taking differently: Take 20 mg by mouth once daily.) 60 tablet 1       Scheduled meds:   amLODIPine  10 mg Oral Daily    apixaban  5 mg Oral  BID    carvediloL  25 mg Oral BID    cloNIDine 0.2 mg/24 hr td ptwk  1 patch Transdermal Q7 Days    epoetin teresa-epbx  50 Units/kg Intravenous Every Mon, Wed, Fri    fidaxomicin  200 mg Oral BID    FLUoxetine  20 mg Oral Daily    furosemide  40 mg Oral BID    hydrALAZINE  100 mg Oral Q8H    insulin detemir U-100  5 Units Subcutaneous Daily    isosorbide mononitrate  120 mg Oral Daily    levETIRAcetam  500 mg Oral BID    mupirocin   Nasal BID    pantoprazole  40 mg Oral Before breakfast       Infusions:      PRN meds:  acetaminophen, dextrose 10%, dextrose 10%, glucagon (human recombinant), glucose, glucose, heparin (porcine), HYDROmorphone, insulin aspart U-100, melatonin, morphine, ondansetron, sodium chloride 0.9%    Review of Systems:    OBJECTIVE:     Vital Signs and IO:  Temp:  [97 °F (36.1 °C)-98.9 °F (37.2 °C)]   Pulse:  []   Resp:  [1-51]   BP: (114-156)/()   SpO2:  [90 %-99 %]   I/O last 3 completed shifts:  In: 620 [P.O.:120; Other:500]  Out: 3500 [Other:3500]  Wt Readings from Last 5 Encounters:   10/18/22 60.2 kg (132 lb 11.5 oz)   10/06/22 68 kg (149 lb 14.6 oz)   08/26/22 70 kg (154 lb 5.2 oz)   08/09/22 74.8 kg (165 lb)   07/30/22 74.8 kg (165 lb)     Body mass index is 24.27 kg/m².    Physical Exam  Constitutional:       General: She is not in acute distress.     Appearance: She is well-developed. She is not diaphoretic.   HENT:      Head: Normocephalic and atraumatic.      Mouth/Throat:      Mouth: Mucous membranes are moist.   Eyes:      General: No scleral icterus.     Pupils: Pupils are equal, round, and reactive to light.   Cardiovascular:      Rate and Rhythm: Normal rate and regular rhythm.   Pulmonary:      Effort: Pulmonary effort is normal. No respiratory distress.      Breath sounds: No stridor.   Abdominal:      General: There is no distension.      Palpations: Abdomen is soft.   Musculoskeletal:         General: No deformity. Normal range of motion.      Cervical back: Neck  supple.   Skin:     General: Skin is warm and dry.      Findings: No erythema or rash.   Neurological:      Mental Status: She is alert and oriented to person, place, and time.      Cranial Nerves: No cranial nerve deficit.   Psychiatric:         Behavior: Behavior normal.     Laboratory:  Recent Labs   Lab 10/16/22  1428 10/17/22  0213 10/18/22  0332   * 134* 140   K 5.3* 4.7 4.0    106 105   CO2 23 21* 23   BUN 82* 80* 37*   CREATININE 6.5* 6.3* 4.5*   * 332* 202*         Recent Labs   Lab 10/16/22  1428 10/17/22  0213 10/18/22  0332   CALCIUM 8.5* 7.8* 8.8   ALBUMIN 2.7* 2.2* 2.6*   MG 2.5  --   --          Recent Labs   Lab 07/08/22  0516   PTH, Intact 289.8 H         No results for input(s): POCTGLUCOSE in the last 168 hours.    Recent Labs   Lab 05/15/22  1954 07/22/22  1653 08/24/22  0218   Hemoglobin A1C 5.8 H 6.0 H 6.2         Recent Labs   Lab 10/16/22  1428 10/17/22  0213 10/18/22  0332   WBC 4.68 6.12 4.87   HGB 10.6* 11.1* 12.7   HCT 30.6* 32.2* 38.2   * 123* 135*   MCV 86 85 87   MCHC 34.6 34.5 33.2   MONO 10.7  0.5 9.0  0.6 9.2  0.5         Recent Labs   Lab 10/16/22  1428 10/17/22  0213 10/18/22  0332   BILITOT 1.5* 1.2* 1.3*   PROT 6.3 5.6* 6.1   ALBUMIN 2.7* 2.2* 2.6*   ALKPHOS 135 120 123   ALT 16 13 13   AST 15 17 12         Recent Labs   Lab 06/11/22  1115 07/07/22  0912 10/16/22  1736   Color, UA Yellow Yellow Yellow   Appearance, UA Clear Clear Hazy A   pH, UA 7.0 6.0 7.0   Specific [place], UA 1.015 1.020 1.020   Protein, UA 3+ A 3+ A 4+   Glucose, UA 2+ A Negative 4+ A   Ketones, UA Negative Negative Trace A   Urobilinogen, UA Negative Negative Negative   Bilirubin (UA) Negative Negative Negative   Occult Blood UA 1+ A 2+ A 2+ A   Nitrite, UA Negative Negative Negative   RBC, UA 3 3 12 H   WBC, UA 4 8 H >100 H   Bacteria Occasional Rare Negative   Hyaline Casts, UA 0 0 7 A         Recent Labs   Lab 07/24/22  0445 10/04/22  0901 10/16/22  1643   POC PH 7.637 HH  7.378 7.398   POC PCO2 26.0 LL 54.4 H 37.6   POC HCO3 27.8 32.0 H 23.2 L   POC PO2 123 H 26 L 55   POC SATURATED O2 99 44 L 88 L   POC BE 7 7 -2   Sample ARTERIAL VENOUS VENOUS         Microbiology Results (last 7 days)       Procedure Component Value Units Date/Time    Stool culture [[id number]] Collected: 10/17/22 0215    Order Status: Completed Specimen: Stool Updated: 10/18/22 0709     Stool Culture Nothing significant to date    Urine culture [[id number]] Collected: 10/16/22 1736    Order Status: Completed Specimen: Urine Updated: 10/18/22 0636     Urine Culture, Routine No growth to date    Narrative:      Specimen Source->Urine    C Diff Toxin by PCR [[id number]]  (Abnormal) Collected: 10/17/22 0215    Order Status: Completed Updated: 10/17/22 0434     C. diff PCR Positive     Comment: Positive CDiff PCR critical result(s) called and verbal readback   obtained from Gary Walton RN in ED by KS3 10/17/2022 04:34         Narrative:      Positive CDiff PCR critical result(s) called and verbal readback   obtained from Gary Walton RN in ED by KS3 10/17/2022 04:34    Clostridium difficile EIA [[id number]]  (Abnormal) Collected: 10/17/22 0215    Order Status: Completed Specimen: Stool Updated: 10/17/22 0310     C. diff Antigen Positive     Comment: results inconclusive, to be determined via PCR method        C difficile Toxins A+B, EIA Negative     Comment: Testing not recommended for children <24 months old.               ASSESSMENT/PLAN:     ESRD on HD TTS via AVF  Continue current dialysis prescription.  Next HD per schedule, emergency HD today.  Renal diet - low K, low phos.  No IVs or BP checks on access and/or non-dominant arm.    Anemia of CKD  Sickle cell trait  Hgb and HCT are acceptable. Monitor for now.  Will provide SHELLEY and/or IV iron PRN.    MBD / Secondary HPT  Ca, Phos, PTH and vitamin D levels are acceptable.   Phos binders, vitamin D and analogues, calcimimetics will be given as needed.    HTN  BP seem  controlled.   Tolerate asymptomatic HTN up to -160.  Continue home meds.  Low sodium diet.    Abdominal pain due to gastroparesis  Management per primary team    Thank you for allowing us to participate in the care of your patient!   We will follow the patient and provide recommendations as needed.    Patient care time was spent personally by me on the following activities:     Obtaining a history.  Examination of patient.  Providing medical care at the patients bedside.  Developing a treatment plan with patient or surrogate and bedside caregivers.  Ordering and reviewing laboratory studies, radiographic studies, pulse oximetry.  Ordering and performing treatments and interventions.  Evaluation of patient's response to treatment.  Discussions with consultants while on the unit and immediately available to the patient.  Re-evaluation of the patient's condition.  Documentation in the medical record.     Mando Benitez MD    [facility]  [address] [place] [address]  [place] [address]    [phone] - tel  [phone] - fax    10/18/2022

## 2022-10-18 NOTE — CARE UPDATE
CM informed the patient father via phone that the patient has transportion service with Medicaid. Informed him that [employer] transportation number is listed on the back of her Medicaid card,  and CM provided him with the phone number to Medicaid transportation for [employer].

## 2022-10-19 ENCOUNTER — PATIENT OUTREACH (OUTPATIENT)
Dept: ADMINISTRATIVE | Facility: OTHER | Age: 32
End: 2022-10-19

## 2022-10-19 LAB
BACTERIA UR CULT: NO GROWTH
STOOL CULTURE: NORMAL
STOOL CULTURE: NORMAL

## 2022-10-19 NOTE — HOSPITAL COURSE
32-year-old lady with prior history of ESRD on hemodialysis, numerous ER visits, numerous hospital admissions mostly due to noncompliance with dialysis, numerous ER visits and hospital stay for unexplained abdominal pain, nausea and vomiting likely due to gastroparesis came with similar complaints and missed dialysis.  She was treated with help of Nephrology, received couple sessions of dialysis, she was found to have C diff infection as well.  Today upon my interview she denies any loose bowel movements, had 1 formed bowel movement last night.  She has very dramatic behavior, likes only IV Dilaudid.  She received maximum benefit of inpatient stay.  She was discharged home in hemodynamically stable condition, I discussed with in-house [facility] pharmacy who provided 10 day p.o. vancomycin supply for C-Diff.  She was counseled extensively on compliance with medications an outpatient dialysis.  She appears to have different stories and excuses every admission for missed dialysis.     I have seen the patient on the day of discharge and reviewed the discharge instructions as outlined below. Patient verbalized understanding and is aware to contact primary care physician or return to ED if new or worsening symptoms.

## 2022-10-19 NOTE — DISCHARGE SUMMARY
[facility] Medicine  Discharge Summary      Patient Name: Tabby Howard  MRN: [id number]  Patient Class: OP- Observation  Admission Date: 10/16/2022  Hospital Length of Stay: 0 days  Discharge Date and Time: 10/18/2022  5:08 PM  Attending Physician: Emani att. providers found   Discharging Provider: Amalia Jefferson MD  Primary Care Provider: [facility] Course:   32-year-old lady with prior history of ESRD on hemodialysis, numerous ER visits, numerous hospital admissions mostly due to noncompliance with dialysis, numerous ER visits and hospital stay for unexplained abdominal pain, nausea and vomiting likely due to gastroparesis came with similar complaints and missed dialysis.  She was treated with help of Nephrology, received couple sessions of dialysis, she was found to have C diff infection as well.  Today upon my interview she denies any loose bowel movements, had 1 formed bowel movement last night.  She has very dramatic behavior, likes only IV Dilaudid.  She received maximum benefit of inpatient stay.  She was discharged home in hemodynamically stable condition, I discussed with in-house [facility] pharmacy who provided 10 day p.o. vancomycin supply for C-Diff.  She was counseled extensively on compliance with medications an outpatient dialysis.  She appears to have different stories and excuses every admission for missed dialysis.     I have seen the patient on the day of discharge and reviewed the discharge instructions as outlined below. Patient verbalized understanding and is aware to contact primary care physician or return to ED if new or worsening symptoms.        Goals of Care Treatment Preferences:  Code Status: Full Code    Health care agent: Step-Mother Tanya Howard / Father Garcia Howard  Health care agent number: [phone] / [phone]          What is most important right now is to focus on remaining as independent  as possible.  Accordingly, we have decided that the best plan to meet the patient's goals includes continuing with treatment.      Consults:   Consults (From admission, onward)        Status Ordering Provider     Inpatient consult to Nephrology  Once        Provider:  Hugh Figueroa MD    Completed ROSALINE ZULUAGA     Inpatient consult to Nephrology  Once        Provider:  Hugh Figueroa MD    Completed BERNIE COUCH          No new Assessment & Plan notes have been filed under this hospital service since the last note was generated.  Service: Hospital Medicine    Final Active Diagnoses:    Diagnosis Date Noted POA    PRINCIPAL PROBLEM:  Clostridium difficile infection [A49.8] 10/18/2022 Yes    ESRD (end stage renal disease) [N18.6] 07/22/2022 Yes      Problems Resolved During this Admission:    Diagnosis Date Noted Date Resolved POA    Generalized abdominal pain [R10.84] 09/15/2018 10/18/2022 Yes       Discharged Condition: good    Disposition: Home or Self Care    Follow Up:   Follow-up Information     [facility] Follow up in 1 week(s).    Contact information:  [place] [id number]  [phone]             Dialysis unit Follow up on 10/20/2022.           [facility]. Schedule an appointment as soon as possible for a visit.    Why: Please schedule a discharge follow up to be seen within the next (7) days.  Contact information:  [place] [id number]  [phone]                       Patient Instructions:      Diet renal     Notify your health care provider if you experience any of the following:  temperature >100.4     Activity as tolerated       Significant Diagnostic Studies: Labs: All labs within the past 24 hours have been reviewed    Pending Diagnostic Studies:     None         Medications:  Reconciled Home Medications:      Medication List      CONTINUE taking these medications    albuterol 90  mcg/actuation inhaler  Commonly known as: PROVENTIL/VENTOLIN HFA  Inhale 2 puffs into the lungs every 6 (six) hours as needed for Wheezing. Rescue     amLODIPine 10 MG tablet  Commonly known as: NORVASC  Take 1 tablet (10 mg total) by mouth once daily.     apixaban 5 mg Tab  Commonly known as: ELIQUIS  Take 1 tablet (5 mg total) by mouth 2 (two) times daily. For second month on.     carvediloL 25 MG tablet  Commonly known as: COREG  Take 1 tablet (25 mg total) by mouth 2 (two) times daily.     cloNIDine 0.2 mg/24 hr td ptwk 0.2 mg/24 hr  Commonly known as: CATAPRES  Place 1 patch onto the skin every 7 days.     famotidine 20 MG tablet  Commonly known as: PEPCID  Take 1 tablet (20 mg total) by mouth once daily.     FLUoxetine 20 MG capsule  Take 1 capsule (20 mg total) by mouth once daily.     furosemide 40 MG tablet  Commonly known as: LASIX  Take 1 tablet (40 mg total) by mouth 2 (two) times daily.     hydrALAZINE 100 MG tablet  Commonly known as: APRESOLINE  Take 1 tablet (100 mg total) by mouth every 8 (eight) hours.     insulin aspart U-100 100 unit/mL (3 mL) Inpn pen  Commonly known as: NovoLOG  Inject 1-10 Units into the skin before meals and at bedtime as needed (Hyperglycemia).     isosorbide mononitrate 60 MG 24 hr tablet  Commonly known as: IMDUR  Take 2 tablets (120 mg total) by mouth once daily.     LANTUS U-100 INSULIN 100 unit/mL injection  Generic drug: insulin glargine  Inject 5 Units into the skin once daily.     levETIRAcetam 500 MG Tab  Commonly known as: KEPPRA  Take 1 tablet (500 mg total) by mouth 2 (two) times daily.     ondansetron 4 MG Tbdl  Commonly known as: ZOFRAN-ODT  Take 1 tablet (4 mg total) by mouth every 8 (eight) hours as needed (nausea, vomiting).     pantoprazole 40 MG tablet  Commonly known as: PROTONIX  Take 1 tablet (40 mg total) by mouth once daily.        STOP taking these medications    ARIPiprazole 5 MG Tab  Commonly known as: ABILIFY     desvenlafaxine succinate 50 MG  Tb24  Commonly known as: PRISTIQ     oxyCODONE-acetaminophen 5-325 mg per tablet  Commonly known as: PERCOCET     polyethylene glycol 17 gram/dose powder  Commonly known as: GLYCOLAX        ASK your doctor about these medications    vancomycin 125 MG capsule  Commonly known as: VANCOCIN  TAKE 1 CAPSULE BY MOUTH FOUR TIMES DAILY FOR 10 DAYS            Indwelling Lines/Drains at time of discharge:   Lines/Drains/Airways     Central Venous Catheter Line  Duration                Hemodialysis Catheter 07/26/22 1457 right internal jugular 84 days                Time spent on the discharge of patient: 35 minutes         Amalia Jefferson MD  Department of Hospital Medicine  [facility]

## 2022-10-23 ENCOUNTER — HOSPITAL ENCOUNTER (EMERGENCY)
Facility: HOSPITAL | Age: 32
Discharge: HOME | End: 2022-10-24
Attending: EMERGENCY MEDICINE
Payer: MEDICAID

## 2022-10-23 DIAGNOSIS — R11.2 NAUSEA AND VOMITING, UNSPECIFIED VOMITING TYPE: ICD-10-CM

## 2022-10-23 DIAGNOSIS — R10.12 LEFT UPPER QUADRANT ABDOMINAL PAIN: Primary | ICD-10-CM

## 2022-10-23 DIAGNOSIS — E87.6 HYPOKALEMIA: ICD-10-CM

## 2022-10-23 LAB
ALBUMIN SERPL BCP-MCNC: 2.7 G/DL (ref 3.5–5.2)
ALP SERPL-CCNC: 89 U/L (ref 55–135)
ALT SERPL W/O P-5'-P-CCNC: 11 U/L (ref 10–44)
ANION GAP SERPL CALC-SCNC: 9 MMOL/L (ref 8–16)
AST SERPL-CCNC: 17 U/L (ref 10–40)
BASOPHILS # BLD AUTO: 0.03 K/UL (ref 0–0.2)
BASOPHILS NFR BLD: 0.4 % (ref 0–1.9)
BILIRUB SERPL-MCNC: 1.4 MG/DL (ref 0.1–1)
BUN SERPL-MCNC: 20 MG/DL (ref 6–20)
CALCIUM SERPL-MCNC: 8 MG/DL (ref 8.7–10.5)
CHLORIDE SERPL-SCNC: 101 MMOL/L (ref 95–110)
CO2 SERPL-SCNC: 26 MMOL/L (ref 23–29)
CREAT SERPL-MCNC: 2.9 MG/DL (ref 0.5–1.4)
DIFFERENTIAL METHOD: ABNORMAL
EOSINOPHIL # BLD AUTO: 0.2 K/UL (ref 0–0.5)
EOSINOPHIL NFR BLD: 2.1 % (ref 0–8)
ERYTHROCYTE [DISTWIDTH] IN BLOOD BY AUTOMATED COUNT: 13.5 % (ref 11.5–14.5)
EST. GFR  (NO RACE VARIABLE): 21.3 ML/MIN/1.73 M^2
GLUCOSE SERPL-MCNC: 121 MG/DL (ref 70–110)
HCT VFR BLD AUTO: 25.8 % (ref 37–48.5)
HGB BLD-MCNC: 9.4 G/DL (ref 12–16)
IMM GRANULOCYTES # BLD AUTO: 0.05 K/UL (ref 0–0.04)
IMM GRANULOCYTES NFR BLD AUTO: 0.6 % (ref 0–0.5)
LIPASE SERPL-CCNC: 40 U/L (ref 4–60)
LYMPHOCYTES # BLD AUTO: 1.1 K/UL (ref 1–4.8)
LYMPHOCYTES NFR BLD: 14.7 % (ref 18–48)
MAGNESIUM SERPL-MCNC: 1.6 MG/DL (ref 1.6–2.6)
MCH RBC QN AUTO: 29.1 PG (ref 27–31)
MCHC RBC AUTO-ENTMCNC: 36.4 G/DL (ref 32–36)
MCV RBC AUTO: 80 FL (ref 82–98)
MONOCYTES # BLD AUTO: 0.6 K/UL (ref 0.3–1)
MONOCYTES NFR BLD: 7.8 % (ref 4–15)
NEUTROPHILS # BLD AUTO: 5.8 K/UL (ref 1.8–7.7)
NEUTROPHILS NFR BLD: 74.4 % (ref 38–73)
NRBC BLD-RTO: 0 /100 WBC
PHOSPHATE SERPL-MCNC: 3.3 MG/DL (ref 2.7–4.5)
PLATELET # BLD AUTO: 99 K/UL (ref 150–450)
PMV BLD AUTO: 9.5 FL (ref 9.2–12.9)
POTASSIUM SERPL-SCNC: 2.7 MMOL/L (ref 3.5–5.1)
PROT SERPL-MCNC: 6 G/DL (ref 6–8.4)
RBC # BLD AUTO: 3.23 M/UL (ref 4–5.4)
SODIUM SERPL-SCNC: 136 MMOL/L (ref 136–145)
WBC # BLD AUTO: 7.73 K/UL (ref 3.9–12.7)

## 2022-10-23 PROCEDURE — 83690 ASSAY OF LIPASE: CPT | Performed by: EMERGENCY MEDICINE

## 2022-10-23 PROCEDURE — 96366 THER/PROPH/DIAG IV INF ADDON: CPT

## 2022-10-23 PROCEDURE — 84100 ASSAY OF PHOSPHORUS: CPT | Performed by: EMERGENCY MEDICINE

## 2022-10-23 PROCEDURE — 83735 ASSAY OF MAGNESIUM: CPT | Performed by: EMERGENCY MEDICINE

## 2022-10-23 PROCEDURE — 85025 COMPLETE CBC W/AUTO DIFF WBC: CPT | Performed by: EMERGENCY MEDICINE

## 2022-10-23 PROCEDURE — 99285 EMERGENCY DEPT VISIT HI MDM: CPT | Mod: 25

## 2022-10-23 PROCEDURE — 96365 THER/PROPH/DIAG IV INF INIT: CPT

## 2022-10-23 PROCEDURE — 63600175 PHARM REV CODE 636 W HCPCS: Performed by: EMERGENCY MEDICINE

## 2022-10-23 PROCEDURE — 96375 TX/PRO/DX INJ NEW DRUG ADDON: CPT

## 2022-10-23 PROCEDURE — 96368 THER/DIAG CONCURRENT INF: CPT

## 2022-10-23 PROCEDURE — 25500020 PHARM REV CODE 255: Performed by: EMERGENCY MEDICINE

## 2022-10-23 PROCEDURE — 80053 COMPREHEN METABOLIC PANEL: CPT | Performed by: EMERGENCY MEDICINE

## 2022-10-23 RX ORDER — POTASSIUM CHLORIDE 7.45 MG/ML
10 INJECTION INTRAVENOUS
Status: DISPENSED | OUTPATIENT
Start: 2022-10-23 | End: 2022-10-24

## 2022-10-23 RX ORDER — HYDROMORPHONE HYDROCHLORIDE 1 MG/ML
1 INJECTION, SOLUTION INTRAMUSCULAR; INTRAVENOUS; SUBCUTANEOUS
Status: COMPLETED | OUTPATIENT
Start: 2022-10-23 | End: 2022-10-23

## 2022-10-23 RX ORDER — ONDANSETRON 2 MG/ML
4 INJECTION INTRAMUSCULAR; INTRAVENOUS
Status: COMPLETED | OUTPATIENT
Start: 2022-10-23 | End: 2022-10-23

## 2022-10-23 RX ORDER — IODIXANOL 320 MG/ML
100 INJECTION, SOLUTION INTRAVASCULAR
Status: COMPLETED | OUTPATIENT
Start: 2022-10-23 | End: 2022-10-23

## 2022-10-23 RX ORDER — MAGNESIUM SULFATE 1 G/100ML
1 INJECTION INTRAVENOUS ONCE
Status: COMPLETED | OUTPATIENT
Start: 2022-10-23 | End: 2022-10-24

## 2022-10-23 RX ADMIN — POTASSIUM CHLORIDE 10 MEQ: 7.46 INJECTION, SOLUTION INTRAVENOUS at 10:10

## 2022-10-23 RX ADMIN — IODIXANOL 100 ML: 320 INJECTION, SOLUTION INTRAVASCULAR at 11:10

## 2022-10-23 RX ADMIN — HYDROMORPHONE HYDROCHLORIDE 1 MG: 1 INJECTION, SOLUTION INTRAMUSCULAR; INTRAVENOUS; SUBCUTANEOUS at 10:10

## 2022-10-23 RX ADMIN — MAGNESIUM SULFATE HEPTAHYDRATE 1 G: 1 INJECTION, SOLUTION INTRAVENOUS at 10:10

## 2022-10-23 RX ADMIN — ONDANSETRON 4 MG: 2 INJECTION, SOLUTION INTRAMUSCULAR; INTRAVENOUS at 10:10

## 2022-10-24 VITALS
RESPIRATION RATE: 18 BRPM | BODY MASS INDEX: 23.92 KG/M2 | HEIGHT: 62 IN | DIASTOLIC BLOOD PRESSURE: 83 MMHG | SYSTOLIC BLOOD PRESSURE: 146 MMHG | WEIGHT: 130 LBS | OXYGEN SATURATION: 98 % | TEMPERATURE: 98 F | HEART RATE: 95 BPM

## 2022-10-24 LAB
BACTERIA #/AREA URNS HPF: NEGATIVE /HPF
BILIRUB UR QL STRIP: NEGATIVE
CLARITY UR: CLEAR
COLOR UR: YELLOW
GLUCOSE UR QL STRIP: ABNORMAL
HGB UR QL STRIP: ABNORMAL
HYALINE CASTS #/AREA URNS LPF: 6 /LPF
KETONES UR QL STRIP: NEGATIVE
LEUKOCYTE ESTERASE UR QL STRIP: NEGATIVE
MICROSCOPIC COMMENT: ABNORMAL
NITRITE UR QL STRIP: NEGATIVE
PH UR STRIP: 8 [PH] (ref 5–8)
PROT UR QL STRIP: ABNORMAL
RBC #/AREA URNS HPF: 41 /HPF (ref 0–4)
SP GR UR STRIP: 1.02 (ref 1–1.03)
SQUAMOUS #/AREA URNS HPF: 3 /HPF
URN SPEC COLLECT METH UR: ABNORMAL
UROBILINOGEN UR STRIP-ACNC: NEGATIVE EU/DL
WBC #/AREA URNS HPF: 24 /HPF (ref 0–5)
YEAST URNS QL MICRO: ABNORMAL

## 2022-10-24 PROCEDURE — 63600175 PHARM REV CODE 636 W HCPCS: Performed by: EMERGENCY MEDICINE

## 2022-10-24 PROCEDURE — 96366 THER/PROPH/DIAG IV INF ADDON: CPT

## 2022-10-24 PROCEDURE — 96367 TX/PROPH/DG ADDL SEQ IV INF: CPT

## 2022-10-24 PROCEDURE — 81001 URINALYSIS AUTO W/SCOPE: CPT | Performed by: EMERGENCY MEDICINE

## 2022-10-24 PROCEDURE — 96375 TX/PRO/DX INJ NEW DRUG ADDON: CPT

## 2022-10-24 RX ORDER — METOCLOPRAMIDE 10 MG/1
10 TABLET ORAL EVERY 6 HOURS
Qty: 30 TABLET | Refills: 0 | Status: ON HOLD | OUTPATIENT
Start: 2022-10-24 | End: 2022-11-01 | Stop reason: HOSPADM

## 2022-10-24 RX ORDER — HYDRALAZINE HYDROCHLORIDE 20 MG/ML
10 INJECTION INTRAMUSCULAR; INTRAVENOUS
Status: COMPLETED | OUTPATIENT
Start: 2022-10-24 | End: 2022-10-24

## 2022-10-24 RX ORDER — DROPERIDOL 2.5 MG/ML
0.62 INJECTION, SOLUTION INTRAMUSCULAR; INTRAVENOUS
Status: COMPLETED | OUTPATIENT
Start: 2022-10-24 | End: 2022-10-24

## 2022-10-24 RX ADMIN — POTASSIUM CHLORIDE 10 MEQ: 7.46 INJECTION, SOLUTION INTRAVENOUS at 01:10

## 2022-10-24 RX ADMIN — DROPERIDOL 0.62 MG: 2.5 INJECTION, SOLUTION INTRAMUSCULAR; INTRAVENOUS at 02:10

## 2022-10-24 RX ADMIN — HYDRALAZINE HYDROCHLORIDE 10 MG: 20 INJECTION INTRAMUSCULAR; INTRAVENOUS at 12:10

## 2022-10-24 NOTE — ED PROVIDER NOTES
Encounter Date: 10/23/2022       History     Chief Complaint   Patient presents with    Abdominal Pain     Bilateral abdomen pain started around 1600 pm. Pt states it woke her from her sleep. Pt has hx of kidney dis, is a dialysis pt with last apt Saturday.        Emergent evaluation of a 32-year-old female with history of end-stage renal disease dialyzes Tuesday Thursday Saturday reports she last dialyzed on Saturday 2 days ago, history of gastric paresis, type 2 diabetes, GERD, heart failure with preserved ejection fraction of 55% in March 2022, sickle cell trait, hyperkalemia, and hypertension who frequently presents to the emergency department for abdominal pain and has issues with noncompliance with dialysis patient had just been admitted from October 16th [facility] due to intractable abdominal pain and was discharged home with vancomycin for C difficile she reports that she took medication in a box but is unsure whether this was vancomycin.  She reports she is having no diarrhea she is having left-sided abdominal pain that is 10/10 and constant that began this morning with nausea and vomiting several times today.  She denies any bloody or black emesis.  She denies any fevers chills or sweats.  She reports she makes a small amount of urine.  She also reports over the past 2 weeks she has lost vision in the right a high and is now only able to see light and the left eye his become more blurry.  This was chronic but progressed rapidly.  She is main appointment with Ophthalmology.  She denies any eye pain.  No numbness tingling or weakness           Review of patient's allergies indicates:   Allergen Reactions    Penicillins Hives     Past Medical History:   Diagnosis Date    CKD (chronic kidney disease), stage IV 4/12/2022    Diabetes mellitus due to underlying condition with unspecified complications 4/12/2022    Gastroparesis 4/12/2022    Heart failure with preserved ejection fraction 4/12/2022    EF 55% on 3/22     History of gastroesophageal reflux (GERD)     History of supraventricular tachycardia     Hyperkalemia 2022    Hypertensive emergency 2022    Sickle cell trait 2022    Type 2 diabetes mellitus      Past Surgical History:   Procedure Laterality Date     SECTION      x 3    COLONOSCOPY      COLONOSCOPY N/A 2022    Procedure: COLONOSCOPY;  Surgeon: Jagdeep Cedeno MD;  Location: [facility];  Service: Endoscopy;  Laterality: N/A;    ESOPHAGOGASTRODUODENOSCOPY N/A 10/18/2019    Procedure: ESOPHAGOGASTRODUODENOSCOPY (EGD);  Surgeon: Gianluca Mendez MD;  Location: [facility];  Service: Endoscopy;  Laterality: N/A;    ESOPHAGOGASTRODUODENOSCOPY N/A 2022    Procedure: EGD (ESOPHAGOGASTRODUODENOSCOPY);  Surgeon: Micky Paredes III, MD;  Location: [facility];  Service: Endoscopy;  Laterality: N/A;    LAPAROSCOPIC CHOLECYSTECTOMY N/A 2022    Procedure: CHOLECYSTECTOMY, LAPAROSCOPIC;  Surgeon: Grey Perez MD;  Location: [facility];  Service: General;  Laterality: N/A;    PLACEMENT OF DUAL-LUMEN VASCULAR CATHETER Left 2022    Procedure: INSERTION-CATHETER-JOSEPH;  Surgeon: Dionte Gan MD;  Location: [facility] OR;  Service: General;  Laterality: Left;    PLACEMENT OF DUAL-LUMEN VASCULAR CATHETER Right 2022    Procedure: INSERTION-CATHETER-Hemosplit;  Surgeon: Dionte Gan MD;  Location: [facility];  Service: General;  Laterality: Right;     Family History   Problem Relation Age of Onset    Diabetes Mother     Diabetes Father      Social History     Tobacco Use    Smoking status: Never    Smokeless tobacco: Never   Substance Use Topics    Alcohol use: No    Drug use: No     Review of Systems   Constitutional:  Negative for activity change, appetite change, chills, diaphoresis, fatigue and fever.   HENT:  Negative for congestion, postnasal drip, rhinorrhea and sore throat.    Respiratory:  Negative for cough, chest tightness, shortness of breath, wheezing and stridor.     Cardiovascular:  Negative for chest pain and palpitations.   Gastrointestinal:  Positive for abdominal pain, nausea and vomiting. Negative for abdominal distention, blood in stool, constipation and diarrhea.   Genitourinary:  Positive for flank pain. Negative for difficulty urinating, dysuria, frequency, hematuria and urgency.   Musculoskeletal:  Negative for arthralgias, back pain, myalgias, neck pain and neck stiffness.   Skin:  Negative for rash.   Allergic/Immunologic: Positive for immunocompromised state.   Neurological:  Negative for dizziness, syncope, weakness, light-headedness and headaches.   Hematological:  Does not bruise/bleed easily.   Psychiatric/Behavioral:  Negative for confusion. The patient is not nervous/anxious.    All other systems reviewed and are negative.    Physical Exam     Initial Vitals [10/23/22 2117]   BP Pulse Resp Temp SpO2   (!) 211/124 94 18 98.4 °F (36.9 °C) 100 %      MAP       --         Physical Exam    Nursing note and vitals reviewed.  Constitutional: She appears well-developed and well-nourished. She is not diaphoretic. She appears distressed.   HENT:   Head: Normocephalic and atraumatic.   Right Ear: External ear normal.   Left Ear: External ear normal.   Nose: Nose normal.   Mouth/Throat: Oropharynx is clear and moist.   Eyes: Conjunctivae and EOM are normal. Pupils are equal, round, and reactive to light.   Neck: Neck supple. No tracheal deviation present.   Normal range of motion.  Cardiovascular:  Normal rate, regular rhythm, normal heart sounds and intact distal pulses.     Exam reveals no gallop and no friction rub.       No murmur heard.  Blood pressure 211/124 pulse 94   Pulmonary/Chest: Breath sounds normal. No stridor. No respiratory distress. She has no wheezes. She has no rhonchi. She has no rales. She exhibits no tenderness.   Abdominal: Abdomen is soft. Bowel sounds are normal. She exhibits no distension and no mass. There is abdominal tenderness.   Patient  is writhing in bed holding left side of abdomen and left flank crying. There is guarding. There is no rebound.   Musculoskeletal:         General: No edema. Normal range of motion.      Cervical back: Normal range of motion and neck supple.     Neurological: She is alert and oriented to person, place, and time. She has normal strength. No cranial nerve deficit or sensory deficit.   Skin: Skin is warm and dry. No rash noted. No erythema. No pallor.   Psychiatric: She has a normal mood and affect. Her behavior is normal. Judgment and thought content normal.       ED Course   Procedures  Labs Reviewed   CBC W/ AUTO DIFFERENTIAL - Abnormal; Notable for the following components:       Result Value    RBC 3.23 (*)     Hemoglobin 9.4 (*)     Hematocrit 25.8 (*)     MCV 80 (*)     MCHC 36.4 (*)     Platelets 99 (*)     Immature Granulocytes 0.6 (*)     Immature Grans (Abs) 0.05 (*)     Gran % 74.4 (*)     Lymph % 14.7 (*)     All other components within normal limits   COMPREHENSIVE METABOLIC PANEL - Abnormal; Notable for the following components:    Potassium 2.7 (*)     Glucose 121 (*)     Creatinine 2.9 (*)     Calcium 8.0 (*)     Albumin 2.7 (*)     Total Bilirubin 1.4 (*)     eGFR 21.3 (*)     All other components within normal limits    Narrative:     POTASSIUM   critical result(s) called and verbal readback obtained   from GLEN CANCINO RN ER. by [facility] 10/23/2022 22:08  POTASSIUM   critical result(s) called and verbal readback obtained   from GLEN CANCINO RN ER. by [facility] 10/23/2022 22:07   URINALYSIS - Abnormal; Notable for the following components:    Glucose, UA 4+ (*)     Occult Blood UA 2+ (*)     All other components within normal limits    Narrative:     Collection Type->Urine, Clean Catch   URINALYSIS MICROSCOPIC - Abnormal; Notable for the following components:    RBC, UA 41 (*)     WBC, UA 24 (*)     Yeast, UA Rare (*)     Hyaline Casts, UA 6 (*)     All other components within normal limits    Narrative:      Collection Type->Urine, Clean Catch   LIPASE   MAGNESIUM   PHOSPHORUS          Imaging Results              CT Abdomen Pelvis With Contrast (Final result)  Result time 10/23/22 23:29:19      Final result by Jason Potts MD (10/23/22 23:29:19)                   Narrative:      PROCEDURE: CT ABDOMEN PELVIS WITH IV CONTRAST, 10/23/2022 10:48 PM CDT    CLINICAL INDICATION:  LLQ abdominal pain.    COMPARISON: 10/16/2022    TECHNIQUE: Helical CT acquisition performed of the abdomen and pelvis with coronal and sagittal reformats. IV Contrast: Nonionic iodinated contrast.  PO Contrast: None.  Complications: None.    CT Dose reduction techniques were utilized for this examination with 1 or more of the following: Automated exposure control and/or adjustment of the mA or kV according to patient size, and/or use of iterative reconstruction technique.    FINDINGS:    Visualized chest base: Atelectasis versus infiltrates in lung bases.    Hepatobiliary system: No suspicious lesions. Portal vein patent.    Gallbladder: Surgically absent.    Spleen: Normal size.    Pancreas: No focal masses. No pancreatic ductal dilatation. No significant peripancreatic inflammatory changes.    Adrenal glands: Normal.    Kidneys and collecting system: No hydronephrosis. Mildly hyperemic appearance of ureteral urothelium at level of kidney on left.    Bowel and mesentery: No pneumoperitoneum. No small bowel obstruction. Trace/small volume free fluid seen just below level of liver and in pelvis.    Appendix: No evidence of appendicitis identified.    Pelvic organs: Distended urinary bladder with mild urinary bladder wall thickening. Uterus present.    Bones/MSK: No acute displaced fracture. Diffuse body wall anasarca.    IMPRESSION:    Mildly hyperemic appearance of ureteral urothelium at level of kidney on left. Could reflect ureteritis. Correlate to assure no evidence of pyelonephritis.    Distended urinary bladder with mild urinary bladder wall  thickening suspicious for urinary tract infection or cystitis.    Atelectasis versus infiltrate in lung bases.    Diffuse body wall anasarca again present.    Trace/small volume free fluid.    Electronically signed by:  Jason Potts MD  10/23/2022 11:29 PM CDT Workstation: [id number]                                     CT Head Without Contrast (Edited Result - FINAL)  Result time 10/23/22 23:32:20      Edited Result - FINAL by Emily Culver MD (10/23/22 23:32:20)                   Narrative:         ADDENDUM #1       I directly communicated these findings to Dr. Antonina Novak at 2328 hours on 10/23/2022.    Electronically signed by:  Emily Culver DO  10/23/2022 11:32 PM CDT Workstation: [id number]     ORIGINAL REPORT       CT HEAD WITHOUT CONTRAST    CLINICAL HISTORY: Neuro deficit, acute, stroke suspected    COMPARISON: None.    TECHNIQUE:  Axial unenhanced CT imaging of the brain. Reformatted coronal and sagittal images obtained.    This examination was performed according to our departmental dose optimization program, which includes automated exposure control, adjustment of the mA and/or kV according to patient size and/or use of iterative reconstruction technique.    FINDINGS:  The ventricles are normal in size and contour. Extra-axial fluid spaces are normal. There is no edema, mass or midline shift. No acute infarction or hyperdense vessel. No abnormality seen within the cerebellar hemispheres or vermis. Fourth ventricle is midline. Prepontine cisterns are not effaced.    Intraorbital contents are intact. Mild mucosal thickening within the maxillary sinuses. Imaged facial bones are intact. Intact calvarium and skull base. Partial opacification of the right and left mastoid air cells. Unremarkable scalp soft tissues.    IMPRESSION:  1. No intracranial acute finding.  2. Mild sinus mucosal disease.  2. Minimal bilateral mastoid effusions.    Electronically signed by:  Emily Culver DO  10/23/2022 11:23 PM CDT  Workstation: [id number]                      Final result by Emily Culver MD (10/23/22 23:23:53)                   Narrative:    CT HEAD WITHOUT CONTRAST    CLINICAL HISTORY: Neuro deficit, acute, stroke suspected    COMPARISON: None.    TECHNIQUE:  Axial unenhanced CT imaging of the brain. Reformatted coronal and sagittal images obtained.    This examination was performed according to our departmental dose optimization program, which includes automated exposure control, adjustment of the mA and/or kV according to patient size and/or use of iterative reconstruction technique.    FINDINGS:  The ventricles are normal in size and contour. Extra-axial fluid spaces are normal. There is no edema, mass or midline shift. No acute infarction or hyperdense vessel. No abnormality seen within the cerebellar hemispheres or vermis. Fourth ventricle is midline. Prepontine cisterns are not effaced.    Intraorbital contents are intact. Mild mucosal thickening within the maxillary sinuses. Imaged facial bones are intact. Intact calvarium and skull base. Partial opacification of the right and left mastoid air cells. Unremarkable scalp soft tissues.    IMPRESSION:  1. No intracranial acute finding.  2. Mild sinus mucosal disease.  2. Minimal bilateral mastoid effusions.    Electronically signed by:  Emily Culver DO  10/23/2022 11:23 PM CDT Workstation: [id number]                                     Medications   potassium chloride 10 mEq in 100 mL IVPB (10 mEq Intravenous New Bag 10/24/22 0142)   droperidoL injection 0.625 mg (0.625 mg Intravenous Incomplete 10/24/22 0253)   HYDROmorphone injection 1 mg (1 mg Intravenous Given 10/23/22 2230)   ondansetron injection 4 mg (4 mg Intravenous Given 10/23/22 2230)   magnesium sulfate in dextrose IVPB (premix) 1 g (0 g Intravenous Stopped 10/24/22 0107)   iodixanoL (VISIPAQUE 320) injection 100 mL (100 mLs Intravenous Given 10/23/22 2305)   hydrALAZINE injection 10 mg (10 mg Intravenous Given  10/24/22 0057)     Medical Decision Making:   Clinical Tests:   Lab Tests: Ordered and Reviewed       <> Summary of Lab: H&H 9.425.8 platelets 20052 MCV 80  Lipase 40, Mag 1.6, phos 3.3  Potassium 2.7 glucose 121 creatinine 2.9 calcium 8 albumin 2.7 out bili 1.4      Cath urine with 4+ glucose 2+ blood 41 red blood cells 24 white blood cells no bacteria rare yeast 6 hyaline cast  Radiological Study: Ordered and Reviewed  ED Management:    Emergent evaluation of a 32-year-old female with history of end-stage renal disease dialyzes Tuesday Thursday Saturday reports she last dialyzed on Saturday 2 days ago, history of gastric paresis, type 2 diabetes, GERD, heart failure with preserved ejection fraction of 55% in March 2022, sickle cell trait, hyperkalemia, and hypertension who frequently presents to the emergency department for abdominal pain and has issues with noncompliance with dialysis patient had just been admitted from [facility] 16th [facility] due to intractable abdominal pain and was discharged home with vancomycin for C difficile she reports that she took medication in a box but is unsure whether this was vancomycin.  She reports she is having no diarrhea she is having left-sided abdominal pain that is 10/10 and constant that began this morning with nausea and vomiting several times today.  She denies any bloody or black emesis.  She denies any fevers chills or sweats.  She reports she makes a small amount of urine.  On physical exam patient appears very uncomfortable heart rate in the upper 90s blood pressure of 211 systolic.  Afebrile.  Sats 90% on room air respirations were 20s.  Tenderness throughout the a left side the abdomen and left flank with voluntary guarding.  No rebound Duarte no peritoneal signs  No peripheral edema.  Clear breath sounds bilaterally.  Patient was treated with IV fluids and 1 mg of Dilaudid while evaluation was performed.  Lab work has returned patient has anemia which is slightly lower  than earlier this month with MCV of 80.  But does not require blood transfusion at this time.  Platelets of 04773.  Creatinine today is 2.9.  BUN of 20.  Potassium is low at 2.7 and was replaced with IV potassium due to nausea and dry heaving.  LFTs were normal.  Bilirubin chronically elevated 1.4.  Normal lipase.  Magnesium and phosphorus.  CT scan of the head was performed due to the patient's acute visual symptoms and was negative for any acute abnormalities CT of the abdomen pelvis revealed possible ureteritis and cystitis but no other acute abnormalities.  The cath urine revealed 4+ glucose and 2+ blood 41 red blood cells with 24 white blood cells but no bacteria.  Will await a urine culture prior to treating this.  Patient was just on antibiotics for UTI and this is an improvement in her urinalysis and was possibly taking her vancomycin.  She now reports she believe she was taking this medication.  Patient felt improvement after the Dilaudid here.  The pain gradually returned.  She was given droperidol and I explained to her that she had no acute need for admission today.  She will be discharged home with Reglan to have dialysis on Tuesday as planned.  Follow up with her providers for further evaluation of chronic abdominal pain.  Antonina Novak M.D.  2:56 AM 10/24/2022                          Clinical Impression:   Final diagnoses:  [R10.12] Left upper quadrant abdominal pain (Primary)  [R11.2] Nausea and vomiting, unspecified vomiting type  [E87.6] Hypokalemia      ED Disposition Condition    Discharge Stable          ED Prescriptions       Medication Sig Dispense Start Date End Date Auth. Provider    metoclopramide HCl (REGLAN) 10 MG tablet Take 1 tablet (10 mg total) by mouth every 6 (six) hours. 30 tablet 10/24/2022 -- Antonina Novak MD          Follow-up Information       Follow up With Specialties Details Why Contact Info Additional Information    Access [facility]  [place]  Schedule an appointment as soon as possible for a visit in 1 day  [address]  [facility] [address]  [phone]       [facility] - Emergency Dept Emergency Medicine Go to  If symptoms worsen [address]  [facility] [address]  [phone] 1st floor             Antonina Novak MD  10/24/22 0256

## 2022-10-26 ENCOUNTER — HOSPITAL ENCOUNTER (INPATIENT)
Facility: HOSPITAL | Age: 32
LOS: 3 days | Discharge: ANOTHER HOSPITAL | DRG: 291 | End: 2022-10-29
Attending: EMERGENCY MEDICINE | Admitting: INTERNAL MEDICINE
Payer: MEDICAID

## 2022-10-26 DIAGNOSIS — E87.70 VOLUME OVERLOAD: ICD-10-CM

## 2022-10-26 DIAGNOSIS — E85.9 AMYLOID DISEASE: ICD-10-CM

## 2022-10-26 DIAGNOSIS — R07.9 CHEST PAIN: ICD-10-CM

## 2022-10-26 DIAGNOSIS — I31.39 PERICARDIAL EFFUSION: ICD-10-CM

## 2022-10-26 DIAGNOSIS — E87.70 HYPERVOLEMIA, UNSPECIFIED HYPERVOLEMIA TYPE: Primary | ICD-10-CM

## 2022-10-26 PROBLEM — R10.9 CHRONIC ABDOMINAL PAIN: Chronic | Status: ACTIVE | Noted: 2022-10-26

## 2022-10-26 PROBLEM — G89.29 CHRONIC ABDOMINAL PAIN: Chronic | Status: ACTIVE | Noted: 2022-10-26

## 2022-10-26 PROBLEM — E87.6 HYPOKALEMIA: Status: ACTIVE | Noted: 2022-10-26

## 2022-10-26 PROBLEM — D69.6 THROMBOCYTOPENIA: Chronic | Status: ACTIVE | Noted: 2022-10-26

## 2022-10-26 PROBLEM — E11.9 TYPE II DIABETES MELLITUS: Chronic | Status: ACTIVE | Noted: 2019-10-16

## 2022-10-26 PROBLEM — E87.1 HYPONATREMIA: Status: ACTIVE | Noted: 2022-10-26

## 2022-10-26 PROBLEM — R73.9 HYPERGLYCEMIA: Status: ACTIVE | Noted: 2022-10-26

## 2022-10-26 PROBLEM — D64.9 ANEMIA: Chronic | Status: ACTIVE | Noted: 2022-10-26

## 2022-10-26 LAB
ABO + RH BLD: NORMAL
ALBUMIN SERPL BCP-MCNC: 2.8 G/DL (ref 3.5–5.2)
ALP SERPL-CCNC: 91 U/L (ref 55–135)
ALT SERPL W/O P-5'-P-CCNC: 13 U/L (ref 10–44)
ANION GAP SERPL CALC-SCNC: 8 MMOL/L (ref 8–16)
AST SERPL-CCNC: 22 U/L (ref 10–40)
BASOPHILS # BLD AUTO: 0.03 K/UL (ref 0–0.2)
BASOPHILS NFR BLD: 0.4 % (ref 0–1.9)
BILIRUB SERPL-MCNC: 2.3 MG/DL (ref 0.1–1)
BLD GP AB SCN CELLS X3 SERPL QL: NORMAL
BLD PROD TYP BPU: NORMAL
BLOOD UNIT EXPIRATION DATE: NORMAL
BLOOD UNIT TYPE CODE: 6200
BLOOD UNIT TYPE: NORMAL
BNP SERPL-MCNC: 4138 PG/ML (ref 0–99)
BUN SERPL-MCNC: 23 MG/DL (ref 6–20)
CALCIUM SERPL-MCNC: 8 MG/DL (ref 8.7–10.5)
CHLORIDE SERPL-SCNC: 93 MMOL/L (ref 95–110)
CO2 SERPL-SCNC: 31 MMOL/L (ref 23–29)
CODING SYSTEM: NORMAL
CREAT SERPL-MCNC: 3 MG/DL (ref 0.5–1.4)
DIFFERENTIAL METHOD: ABNORMAL
DISPENSE STATUS: NORMAL
EOSINOPHIL # BLD AUTO: 0.1 K/UL (ref 0–0.5)
EOSINOPHIL NFR BLD: 0.8 % (ref 0–8)
ERYTHROCYTE [DISTWIDTH] IN BLOOD BY AUTOMATED COUNT: 14.1 % (ref 11.5–14.5)
EST. GFR  (NO RACE VARIABLE): 20.4 ML/MIN/1.73 M^2
FERRITIN SERPL-MCNC: 3204 NG/ML (ref 20–300)
FOLATE SERPL-MCNC: 7.2 NG/ML (ref 4–24)
GLUCOSE SERPL-MCNC: 317 MG/DL (ref 70–110)
GLUCOSE SERPL-MCNC: 448 MG/DL (ref 70–110)
HCG INTACT+B SERPL-ACNC: 1.9 MIU/ML
HCT VFR BLD AUTO: 21.7 % (ref 37–48.5)
HGB BLD-MCNC: 7.6 G/DL (ref 12–16)
IMM GRANULOCYTES # BLD AUTO: 0.04 K/UL (ref 0–0.04)
IMM GRANULOCYTES NFR BLD AUTO: 0.5 % (ref 0–0.5)
INFLUENZA A, MOLECULAR: NEGATIVE
INFLUENZA B, MOLECULAR: NEGATIVE
INR PPP: 1
IRON SERPL-MCNC: 151 UG/DL (ref 30–160)
LIPASE SERPL-CCNC: 35 U/L (ref 4–60)
LYMPHOCYTES # BLD AUTO: 1 K/UL (ref 1–4.8)
LYMPHOCYTES NFR BLD: 13.2 % (ref 18–48)
MAGNESIUM SERPL-MCNC: 1.8 MG/DL (ref 1.6–2.6)
MCH RBC QN AUTO: 28.9 PG (ref 27–31)
MCHC RBC AUTO-ENTMCNC: 35 G/DL (ref 32–36)
MCV RBC AUTO: 83 FL (ref 82–98)
MONOCYTES # BLD AUTO: 0.6 K/UL (ref 0.3–1)
MONOCYTES NFR BLD: 8.1 % (ref 4–15)
NEUTROPHILS # BLD AUTO: 5.9 K/UL (ref 1.8–7.7)
NEUTROPHILS NFR BLD: 77 % (ref 38–73)
NRBC BLD-RTO: 0 /100 WBC
NUM UNITS TRANS PACKED RBC: NORMAL
PLATELET # BLD AUTO: 74 K/UL (ref 150–450)
PMV BLD AUTO: 10.9 FL (ref 9.2–12.9)
POTASSIUM SERPL-SCNC: 2.9 MMOL/L (ref 3.5–5.1)
PROT SERPL-MCNC: 6.2 G/DL (ref 6–8.4)
PROTHROMBIN TIME: 12.9 SEC (ref 11.4–13.7)
RBC # BLD AUTO: 2.63 M/UL (ref 4–5.4)
RETICS/RBC NFR AUTO: 3.5 % (ref 0.5–2.5)
SARS-COV-2 RDRP RESP QL NAA+PROBE: NEGATIVE
SATURATED IRON: 79 % (ref 20–50)
SODIUM SERPL-SCNC: 132 MMOL/L (ref 136–145)
SPECIMEN SOURCE: NORMAL
TOTAL IRON BINDING CAPACITY: 190 UG/DL (ref 250–450)
TRANSFERRIN SERPL-MCNC: 136 MG/DL (ref 200–375)
TROPONIN I SERPL DL<=0.01 NG/ML-MCNC: 0.06 NG/ML
TROPONIN I SERPL DL<=0.01 NG/ML-MCNC: 0.07 NG/ML
VIT B12 SERPL-MCNC: 658 PG/ML (ref 210–950)
WBC # BLD AUTO: 7.65 K/UL (ref 3.9–12.7)

## 2022-10-26 PROCEDURE — 36415 COLL VENOUS BLD VENIPUNCTURE: CPT | Performed by: NURSE PRACTITIONER

## 2022-10-26 PROCEDURE — 99285 EMERGENCY DEPT VISIT HI MDM: CPT | Mod: 25

## 2022-10-26 PROCEDURE — 63600175 PHARM REV CODE 636 W HCPCS: Performed by: EMERGENCY MEDICINE

## 2022-10-26 PROCEDURE — 25000003 PHARM REV CODE 250: Performed by: NURSE PRACTITIONER

## 2022-10-26 PROCEDURE — 86850 RBC ANTIBODY SCREEN: CPT | Performed by: NURSE PRACTITIONER

## 2022-10-26 PROCEDURE — 87340 HEPATITIS B SURFACE AG IA: CPT | Performed by: INTERNAL MEDICINE

## 2022-10-26 PROCEDURE — G0378 HOSPITAL OBSERVATION PER HR: HCPCS

## 2022-10-26 PROCEDURE — 63600175 PHARM REV CODE 636 W HCPCS: Performed by: INTERNAL MEDICINE

## 2022-10-26 PROCEDURE — 93010 EKG 12-LEAD: ICD-10-PCS | Mod: ,,, | Performed by: SPECIALIST

## 2022-10-26 PROCEDURE — 86706 HEP B SURFACE ANTIBODY: CPT | Performed by: INTERNAL MEDICINE

## 2022-10-26 PROCEDURE — 85045 AUTOMATED RETICULOCYTE COUNT: CPT | Performed by: NURSE PRACTITIONER

## 2022-10-26 PROCEDURE — 93005 ELECTROCARDIOGRAM TRACING: CPT | Performed by: SPECIALIST

## 2022-10-26 PROCEDURE — 80053 COMPREHEN METABOLIC PANEL: CPT | Performed by: NURSE PRACTITIONER

## 2022-10-26 PROCEDURE — 96376 TX/PRO/DX INJ SAME DRUG ADON: CPT

## 2022-10-26 PROCEDURE — 84484 ASSAY OF TROPONIN QUANT: CPT | Performed by: NURSE PRACTITIONER

## 2022-10-26 PROCEDURE — 25000003 PHARM REV CODE 250: Performed by: INTERNAL MEDICINE

## 2022-10-26 PROCEDURE — 84484 ASSAY OF TROPONIN QUANT: CPT | Mod: 91 | Performed by: INTERNAL MEDICINE

## 2022-10-26 PROCEDURE — 82746 ASSAY OF FOLIC ACID SERUM: CPT | Performed by: NURSE PRACTITIONER

## 2022-10-26 PROCEDURE — 83690 ASSAY OF LIPASE: CPT | Performed by: NURSE PRACTITIONER

## 2022-10-26 PROCEDURE — 96375 TX/PRO/DX INJ NEW DRUG ADDON: CPT

## 2022-10-26 PROCEDURE — 93010 ELECTROCARDIOGRAM REPORT: CPT | Mod: ,,, | Performed by: SPECIALIST

## 2022-10-26 PROCEDURE — 83735 ASSAY OF MAGNESIUM: CPT | Performed by: NURSE PRACTITIONER

## 2022-10-26 PROCEDURE — P9016 RBC LEUKOCYTES REDUCED: HCPCS | Performed by: NURSE PRACTITIONER

## 2022-10-26 PROCEDURE — 85025 COMPLETE CBC W/AUTO DIFF WBC: CPT | Performed by: NURSE PRACTITIONER

## 2022-10-26 PROCEDURE — 84466 ASSAY OF TRANSFERRIN: CPT | Performed by: NURSE PRACTITIONER

## 2022-10-26 PROCEDURE — 36415 COLL VENOUS BLD VENIPUNCTURE: CPT | Performed by: INTERNAL MEDICINE

## 2022-10-26 PROCEDURE — 86920 COMPATIBILITY TEST SPIN: CPT | Performed by: NURSE PRACTITIONER

## 2022-10-26 PROCEDURE — 83880 ASSAY OF NATRIURETIC PEPTIDE: CPT | Performed by: NURSE PRACTITIONER

## 2022-10-26 PROCEDURE — 82607 VITAMIN B-12: CPT | Performed by: NURSE PRACTITIONER

## 2022-10-26 PROCEDURE — 82728 ASSAY OF FERRITIN: CPT | Performed by: NURSE PRACTITIONER

## 2022-10-26 PROCEDURE — 12000002 HC ACUTE/MED SURGE SEMI-PRIVATE ROOM

## 2022-10-26 PROCEDURE — 90935 HEMODIALYSIS ONE EVALUATION: CPT

## 2022-10-26 PROCEDURE — 86920 COMPATIBILITY TEST SPIN: CPT | Performed by: INTERNAL MEDICINE

## 2022-10-26 PROCEDURE — U0002 COVID-19 LAB TEST NON-CDC: HCPCS | Performed by: EMERGENCY MEDICINE

## 2022-10-26 PROCEDURE — 84702 CHORIONIC GONADOTROPIN TEST: CPT | Performed by: NURSE PRACTITIONER

## 2022-10-26 PROCEDURE — 87502 INFLUENZA DNA AMP PROBE: CPT | Performed by: EMERGENCY MEDICINE

## 2022-10-26 PROCEDURE — 36430 TRANSFUSION BLD/BLD COMPNT: CPT

## 2022-10-26 PROCEDURE — 96372 THER/PROPH/DIAG INJ SC/IM: CPT | Performed by: INTERNAL MEDICINE

## 2022-10-26 PROCEDURE — 85610 PROTHROMBIN TIME: CPT | Performed by: NURSE PRACTITIONER

## 2022-10-26 RX ORDER — AMLODIPINE BESYLATE 5 MG/1
10 TABLET ORAL DAILY
Status: DISCONTINUED | OUTPATIENT
Start: 2022-10-26 | End: 2022-10-29 | Stop reason: HOSPADM

## 2022-10-26 RX ORDER — IBUPROFEN 200 MG
16 TABLET ORAL
Status: DISCONTINUED | OUTPATIENT
Start: 2022-10-26 | End: 2022-10-29 | Stop reason: HOSPADM

## 2022-10-26 RX ORDER — INSULIN ASPART 100 [IU]/ML
1-10 INJECTION, SOLUTION INTRAVENOUS; SUBCUTANEOUS
Status: DISCONTINUED | OUTPATIENT
Start: 2022-10-26 | End: 2022-10-29 | Stop reason: HOSPADM

## 2022-10-26 RX ORDER — IODIXANOL 320 MG/ML
100 INJECTION, SOLUTION INTRAVASCULAR
Status: COMPLETED | OUTPATIENT
Start: 2022-10-26 | End: 2022-10-29

## 2022-10-26 RX ORDER — HYDROCODONE BITARTRATE AND ACETAMINOPHEN 500; 5 MG/1; MG/1
TABLET ORAL
Status: DISCONTINUED | OUTPATIENT
Start: 2022-10-26 | End: 2022-10-29 | Stop reason: HOSPADM

## 2022-10-26 RX ORDER — MUPIROCIN 20 MG/G
OINTMENT TOPICAL 2 TIMES DAILY
Status: DISCONTINUED | OUTPATIENT
Start: 2022-10-26 | End: 2022-10-29 | Stop reason: HOSPADM

## 2022-10-26 RX ORDER — ONDANSETRON 2 MG/ML
4 INJECTION INTRAMUSCULAR; INTRAVENOUS EVERY 8 HOURS PRN
Status: DISCONTINUED | OUTPATIENT
Start: 2022-10-26 | End: 2022-10-29 | Stop reason: HOSPADM

## 2022-10-26 RX ORDER — IBUPROFEN 200 MG
24 TABLET ORAL
Status: DISCONTINUED | OUTPATIENT
Start: 2022-10-26 | End: 2022-10-29 | Stop reason: HOSPADM

## 2022-10-26 RX ORDER — TALC
6 POWDER (GRAM) TOPICAL NIGHTLY PRN
Status: DISCONTINUED | OUTPATIENT
Start: 2022-10-26 | End: 2022-10-29 | Stop reason: HOSPADM

## 2022-10-26 RX ORDER — NALOXONE HCL 0.4 MG/ML
0.02 VIAL (ML) INJECTION
Status: DISCONTINUED | OUTPATIENT
Start: 2022-10-26 | End: 2022-10-29 | Stop reason: HOSPADM

## 2022-10-26 RX ORDER — ACETAMINOPHEN 325 MG/1
650 TABLET ORAL EVERY 4 HOURS PRN
Status: DISCONTINUED | OUTPATIENT
Start: 2022-10-26 | End: 2022-10-29 | Stop reason: HOSPADM

## 2022-10-26 RX ORDER — SODIUM CHLORIDE 0.9 % (FLUSH) 0.9 %
10 SYRINGE (ML) INJECTION EVERY 6 HOURS PRN
Status: DISCONTINUED | OUTPATIENT
Start: 2022-10-26 | End: 2022-10-29 | Stop reason: HOSPADM

## 2022-10-26 RX ORDER — PANTOPRAZOLE SODIUM 40 MG/1
40 TABLET, DELAYED RELEASE ORAL
Status: DISCONTINUED | OUTPATIENT
Start: 2022-10-27 | End: 2022-10-29 | Stop reason: HOSPADM

## 2022-10-26 RX ORDER — MORPHINE SULFATE 2 MG/ML
2 INJECTION, SOLUTION INTRAMUSCULAR; INTRAVENOUS EVERY 6 HOURS PRN
Status: DISCONTINUED | OUTPATIENT
Start: 2022-10-26 | End: 2022-10-26

## 2022-10-26 RX ORDER — MORPHINE SULFATE 4 MG/ML
4 INJECTION, SOLUTION INTRAMUSCULAR; INTRAVENOUS
Status: COMPLETED | OUTPATIENT
Start: 2022-10-26 | End: 2022-10-26

## 2022-10-26 RX ORDER — GLUCAGON 1 MG
1 KIT INJECTION
Status: DISCONTINUED | OUTPATIENT
Start: 2022-10-26 | End: 2022-10-29 | Stop reason: HOSPADM

## 2022-10-26 RX ORDER — INSULIN ASPART 100 [IU]/ML
5 INJECTION, SOLUTION INTRAVENOUS; SUBCUTANEOUS ONCE
Status: DISCONTINUED | OUTPATIENT
Start: 2022-10-26 | End: 2022-10-29 | Stop reason: HOSPADM

## 2022-10-26 RX ORDER — ONDANSETRON 2 MG/ML
4 INJECTION INTRAMUSCULAR; INTRAVENOUS
Status: COMPLETED | OUTPATIENT
Start: 2022-10-26 | End: 2022-10-26

## 2022-10-26 RX ORDER — HYDRALAZINE HYDROCHLORIDE 25 MG/1
100 TABLET, FILM COATED ORAL EVERY 8 HOURS
Status: DISCONTINUED | OUTPATIENT
Start: 2022-10-26 | End: 2022-10-29 | Stop reason: HOSPADM

## 2022-10-26 RX ORDER — HYDROMORPHONE HYDROCHLORIDE 1 MG/ML
0.5 INJECTION, SOLUTION INTRAMUSCULAR; INTRAVENOUS; SUBCUTANEOUS EVERY 4 HOURS PRN
Status: DISCONTINUED | OUTPATIENT
Start: 2022-10-27 | End: 2022-10-29 | Stop reason: HOSPADM

## 2022-10-26 RX ORDER — HYDROMORPHONE HYDROCHLORIDE 1 MG/ML
1 INJECTION, SOLUTION INTRAMUSCULAR; INTRAVENOUS; SUBCUTANEOUS EVERY 4 HOURS PRN
Status: DISCONTINUED | OUTPATIENT
Start: 2022-10-27 | End: 2022-10-28

## 2022-10-26 RX ORDER — SODIUM CHLORIDE 9 MG/ML
INJECTION, SOLUTION INTRAVENOUS ONCE
Status: CANCELLED | OUTPATIENT
Start: 2022-10-26 | End: 2022-10-26

## 2022-10-26 RX ORDER — CARVEDILOL 25 MG/1
25 TABLET ORAL 2 TIMES DAILY
Status: DISCONTINUED | OUTPATIENT
Start: 2022-10-26 | End: 2022-10-28

## 2022-10-26 RX ORDER — POTASSIUM CHLORIDE 20 MEQ/1
40 TABLET, EXTENDED RELEASE ORAL ONCE
Status: COMPLETED | OUTPATIENT
Start: 2022-10-26 | End: 2022-10-26

## 2022-10-26 RX ORDER — NAPROXEN SODIUM 220 MG/1
324 TABLET, FILM COATED ORAL ONCE
Status: DISCONTINUED | OUTPATIENT
Start: 2022-10-26 | End: 2022-10-26

## 2022-10-26 RX ADMIN — ONDANSETRON 4 MG: 2 INJECTION INTRAMUSCULAR; INTRAVENOUS at 01:10

## 2022-10-26 RX ADMIN — APIXABAN 5 MG: 5 TABLET, FILM COATED ORAL at 10:10

## 2022-10-26 RX ADMIN — HYDROMORPHONE HYDROCHLORIDE 1 MG: 1 INJECTION, SOLUTION INTRAMUSCULAR; INTRAVENOUS; SUBCUTANEOUS at 11:10

## 2022-10-26 RX ADMIN — MUPIROCIN 2 G: 20 OINTMENT TOPICAL at 10:10

## 2022-10-26 RX ADMIN — MORPHINE SULFATE 2 MG: 2 INJECTION, SOLUTION INTRAMUSCULAR; INTRAVENOUS at 09:10

## 2022-10-26 RX ADMIN — ONDANSETRON 4 MG: 2 INJECTION INTRAMUSCULAR; INTRAVENOUS at 10:10

## 2022-10-26 RX ADMIN — MORPHINE SULFATE 4 MG: 4 INJECTION, SOLUTION INTRAMUSCULAR; INTRAVENOUS at 03:10

## 2022-10-26 RX ADMIN — HYDRALAZINE HYDROCHLORIDE 100 MG: 25 TABLET ORAL at 10:10

## 2022-10-26 RX ADMIN — POTASSIUM CHLORIDE 40 MEQ: 1500 TABLET, EXTENDED RELEASE ORAL at 10:10

## 2022-10-26 RX ADMIN — INSULIN DETEMIR 10 UNITS: 100 INJECTION, SOLUTION SUBCUTANEOUS at 10:10

## 2022-10-26 NOTE — HPI
Ms. Howard is a 32-years-old female who presented to the ED with chief complaint of pain.  Patient reports last night she developed left-sided chest discomfort, associated with shortness of breath, intermittently productive of phlegm, described as blood tinged.  States she is not on home oxygen.  Also admits chronic diffuse abdominal and back pain.  Denies any fever, chills, diarrhea/loose stools, no longer makes urine, no lower extremity edema. patient with recent [facility] admission for abdominal pain with C diff, she was discharged to complete a course of oral vancomycin which she confirms she has but unable to tell me when the last dose.  Known history of ESRD on HD TTS, denies missing any recent sessions.  Known history of insulin-dependent diabetes, states she takes Levemir 18 units in the evening but unable to tell me when last dose.  In the ED afebrile, heart rates in the low 100, blood pressure uncontrolled up to 179/108, placed on nasal cannula for comfort.  Labs with WBC 7.65, hemoglobin 7.6, platelets 74, potassium 2.9, creatinine 3, glucose 448, BNP 4138, troponin 0.058.  EKG sinus tachycardia at 108 beats per minute.  Chest x-ray consistent with pulmonary edema.  CT abdomen and pelvis reviewed.  Previous echo July 2022 with EF of 50% with moderate circumferential pericardial effusion.  She received morphine 4 mg and Zofran 4 mg in the E D.  Case discussed with ED provider who stated he discussed with Nephrology, planning dialysis today for volume removal as well as 1 unit PRBC transfusion.  Plan of care discussed with patient, no visitors at bedside.  Discussed with RN.

## 2022-10-26 NOTE — ED NOTES
Pt updated with plan of care. Pt needs and has orders for dialysis and one unit of blood. Pt agrees and has signed consent for both. Dr. Pittman request by pt.  At bedside at this time.

## 2022-10-26 NOTE — H&P
Former[facility] - Emergency Dept  Hospital Medicine  History & Physical    Patient Name: Tabby Howard  MRN: [id number]  Patient Class: OP- Observation  Admission Date: 10/26/2022  Attending Physician: Suzy Bobo MD   Primary Care Provider: Primary Doctor No         Patient information was obtained from patient, past medical records and ER records.     Subjective:     Principal Problem:Volume overload    Chief Complaint:   Chief Complaint   Patient presents with    Abdominal Pain        HPI: Ms. Howard is a 32-years-old female who presented to the ED with chief complaint of pain.  Patient reports last night she developed left-sided chest discomfort, associated with shortness of breath, intermittently productive of phlegm, described as blood tinged.  States she is not on home oxygen.  Also admits chronic diffuse abdominal and back pain.  Denies any fever, chills, diarrhea/loose stools, no longer makes urine, no lower extremity edema. patient with recent [facility] admission for abdominal pain with C diff, she was discharged to complete a course of oral vancomycin which she confirms she has but unable to tell me when the last dose.  Known history of ESRD on HD TTS, denies missing any recent sessions.  Known history of insulin-dependent diabetes, states she takes Levemir 18 units in the evening but unable to tell me when last dose.  In the ED afebrile, heart rates in the low 100, blood pressure uncontrolled up to 179/108, placed on nasal cannula for comfort.  Labs with WBC 7.65, hemoglobin 7.6, platelets 74, potassium 2.9, creatinine 3, glucose 448, BNP 4138, troponin 0.058.  EKG sinus tachycardia at 108 beats per minute.  Chest x-ray consistent with pulmonary edema.  CT abdomen and pelvis reviewed.  Previous echo July 2022 with EF of 50% with moderate circumferential pericardial effusion.  She received morphine 4 mg and Zofran 4 mg in the E D.  Case discussed with ED provider who stated he discussed with  Nephrology, planning dialysis today for volume removal as well as 1 unit PRBC transfusion.  Plan of care discussed with patient, no visitors at bedside.  Discussed with RN.      Past Medical History:   Diagnosis Date    CKD (chronic kidney disease), stage IV 2022    Diabetes mellitus due to underlying condition with unspecified complications 2022    Gastroparesis 2022    Heart failure with preserved ejection fraction 2022    EF 55% on 3/22    History of gastroesophageal reflux (GERD)     History of supraventricular tachycardia     Hyperkalemia 2022    Hypertensive emergency 2022    Sickle cell trait 2022    Type 2 diabetes mellitus        Past Surgical History:   Procedure Laterality Date     SECTION      x 3    COLONOSCOPY      COLONOSCOPY N/A 2022    Procedure: COLONOSCOPY;  Surgeon: Jagdeep Cedeno MD;  Location: [facility];  Service: Endoscopy;  Laterality: N/A;    ESOPHAGOGASTRODUODENOSCOPY N/A 10/18/2019    Procedure: ESOPHAGOGASTRODUODENOSCOPY (EGD);  Surgeon: Gianluca Mendez MD;  Location: [facility];  Service: Endoscopy;  Laterality: N/A;    ESOPHAGOGASTRODUODENOSCOPY N/A 2022    Procedure: EGD (ESOPHAGOGASTRODUODENOSCOPY);  Surgeon: Micky Paredes III, MD;  Location: [facility];  Service: Endoscopy;  Laterality: N/A;    LAPAROSCOPIC CHOLECYSTECTOMY N/A 2022    Procedure: CHOLECYSTECTOMY, LAPAROSCOPIC;  Surgeon: Grey Perez MD;  Location: [facility];  Service: General;  Laterality: N/A;    PLACEMENT OF DUAL-LUMEN VASCULAR CATHETER Left 2022    Procedure: INSERTION-CATHETER-JOSEPH;  Surgeon: Dionte Gan MD;  Location: [facility] OR;  Service: General;  Laterality: Left;    PLACEMENT OF DUAL-LUMEN VASCULAR CATHETER Right 2022    Procedure: INSERTION-CATHETER-Hemosplit;  Surgeon: Dionte Gan MD;  Location: [facility] OR;  Service: General;  Laterality: Right;       Review of patient's allergies indicates:   Allergen  Reactions    Penicillins Hives       No current facility-administered medications on file prior to encounter.     Current Outpatient Medications on File Prior to Encounter   Medication Sig    amLODIPine (NORVASC) 10 MG tablet Take 1 tablet (10 mg total) by mouth once daily.    apixaban (ELIQUIS) 5 mg Tab Take 1 tablet (5 mg total) by mouth 2 (two) times daily. For second month on.    cloNIDine 0.2 mg/24 hr td ptwk (CATAPRES) 0.2 mg/24 hr Place 1 patch onto the skin every 7 days.    albuterol (PROVENTIL/VENTOLIN HFA) 90 mcg/actuation inhaler Inhale 2 puffs into the lungs every 6 (six) hours as needed for Wheezing. Rescue    carvediloL (COREG) 25 MG tablet Take 1 tablet (25 mg total) by mouth 2 (two) times daily.    famotidine (PEPCID) 20 MG tablet Take 1 tablet (20 mg total) by mouth once daily.    FLUoxetine 20 MG capsule Take 1 capsule (20 mg total) by mouth once daily.    furosemide (LASIX) 40 MG tablet Take 1 tablet (40 mg total) by mouth 2 (two) times daily.    hydrALAZINE (APRESOLINE) 100 MG tablet Take 1 tablet (100 mg total) by mouth every 8 (eight) hours.    insulin aspart U-100 (NOVOLOG) 100 unit/mL (3 mL) InPn pen Inject 1-10 Units into the skin before meals and at bedtime as needed (Hyperglycemia).    isosorbide mononitrate (IMDUR) 60 MG 24 hr tablet Take 2 tablets (120 mg total) by mouth once daily.    LANTUS U-100 INSULIN 100 unit/mL injection Inject 5 Units into the skin once daily.    levETIRAcetam (KEPPRA) 500 MG Tab Take 1 tablet (500 mg total) by mouth 2 (two) times daily.    metoclopramide HCl (REGLAN) 10 MG tablet Take 1 tablet (10 mg total) by mouth every 6 (six) hours.    ondansetron (ZOFRAN-ODT) 4 MG TbDL Take 1 tablet (4 mg total) by mouth every 8 (eight) hours as needed (nausea, vomiting).    pantoprazole (PROTONIX) 40 MG tablet Take 1 tablet (40 mg total) by mouth once daily.    vancomycin (VANCOCIN) 125 MG capsule TAKE 1 CAPSULE BY MOUTH FOUR TIMES DAILY FOR 10 DAYS  (Patient taking differently: Take 125 mg by mouth 4 (four) times daily.)    [DISCONTINUED] atenoloL (TENORMIN) 50 MG tablet Take 1 tablet (50 mg total) by mouth every other day.    [DISCONTINUED] omeprazole (PRILOSEC) 20 MG capsule Take 2 capsules (40 mg total) by mouth once daily. for 10 days    [DISCONTINUED] torsemide (DEMADEX) 20 MG Tab Take 1 tablet (20 mg total) by mouth 2 (two) times a day. (Patient taking differently: Take 20 mg by mouth once daily.)     Family History       Problem Relation (Age of Onset)    Diabetes Mother, Father          Tobacco Use    Smoking status: Never    Smokeless tobacco: Never   Substance and Sexual Activity    Alcohol use: Not Currently    Drug use: No    Sexual activity: Not Currently     Partners: Male     Birth control/protection: I.U.D.     Review of Systems   Constitutional:  Negative for fever.   HENT:  Negative for congestion.    Respiratory:  Positive for cough and shortness of breath.    Cardiovascular:  Positive for chest pain.   Gastrointestinal:  Positive for abdominal pain. Negative for diarrhea.   Genitourinary:         No longer produces urine   Musculoskeletal:  Positive for back pain.   Skin:  Negative for wound.   Neurological:  Negative for seizures and speech difficulty.   Psychiatric/Behavioral:  Negative for behavioral problems.    Objective:     Vital Signs (Most Recent):  Temp: 98.9 °F (37.2 °C) (10/26/22 1151)  Pulse: 107 (10/26/22 1530)  Resp: (!) 22 (10/26/22 1540)  BP: (!) 183/106 (10/26/22 1530)  SpO2: 96 % (10/26/22 1530)   Vital Signs (24h Range):  Temp:  [98.9 °F (37.2 °C)] 98.9 °F (37.2 °C)  Pulse:  [102-110] 107  Resp:  [18-22] 22  SpO2:  [96 %-100 %] 96 %  BP: (169-184)/() 183/106     Weight: 59 kg (130 lb)  Body mass index is 23.78 kg/m².    Physical Exam  Vitals and nursing note reviewed.   Constitutional:       Comments: Lying in stretcher, cooperative   HENT:      Head: Normocephalic and atraumatic.      Mouth/Throat:       Mouth: Mucous membranes are moist.   Eyes:      General:         Right eye: No discharge.         Left eye: No discharge.      Conjunctiva/sclera: Conjunctivae normal.   Cardiovascular:      Rate and Rhythm: Regular rhythm. Tachycardia present.      Comments: Warm peripheries, no LE edema  Pulmonary:      Comments: On NC, inspiratory crackles at bases, no wheeze, no accessory muscle use  Abdominal:      Comments: Non distended, soft, tenderness to palpation all quadrant, some distractibility, no peritoneal signs, +BS   Genitourinary:     Comments: No [place]  Musculoskeletal:      Cervical back: Neck supple.   Skin:     Comments: Right chest wall dialysis catheter with dressing overlying   Neurological:      Mental Status: She is alert and oriented to person, place, and time.      Comments: Speech intact, no of his/Septra, following simple commands, moving all 4 extremities   Psychiatric:         Mood and Affect: Mood normal.           Significant Labs: BMP:   Recent Labs   Lab 10/26/22  1235   *   *   K 2.9*   CL 93*   CO2 31*   BUN 23*   CREATININE 3.0*   CALCIUM 8.0*     CBC:   Recent Labs   Lab 10/26/22  1235   WBC 7.65   HGB 7.6*   HCT 21.7*   PLT 74*     CMP:   Recent Labs   Lab 10/26/22  1235   *   K 2.9*   CL 93*   CO2 31*   *   BUN 23*   CREATININE 3.0*   CALCIUM 8.0*   PROT 6.2   ALBUMIN 2.8*   BILITOT 2.3*   ALKPHOS 91   AST 22   ALT 13   ANIONGAP 8     Cardiac Markers:   Recent Labs   Lab 10/26/22  1235   BNP 4,138*     Lactic Acid: No results for input(s): LACTATE in the last 48 hours.  POCT Glucose: No results for input(s): POCTGLUCOSE in the last 48 hours.  Respiratory Culture: No results for input(s): GSRESP, RESPIRATORYC in the last 48 hours.  Troponin:   Recent Labs   Lab 10/26/22  1235   TROPONINI 0.056*     TSH:   Recent Labs   Lab 07/08/22  1517   TSH 1.866     Urine Culture: No results for input(s): LABURIN in the last 48 hours.  Urine Studies: No results for input(s):  COLORU, APPEARANCEUA, PHUR, SPECGRAV, PROTEINUA, GLUCUA, KETONESU, BILIRUBINUA, OCCULTUA, NITRITE, UROBILINOGEN, LEUKOCYTESUR, RBCUA, WBCUA, BACTERIA, SQUAMEPITHEL, HYALINECASTS in the last 48 hours.    Invalid input(s): HAY    Significant Imaging: I have reviewed all pertinent imaging results/findings within the past 24 hours.    X-Ray Abdomen AP 1 View    Result Date: 10/6/2022  EXAMINATION: XR ABDOMEN AP 1 VIEW CLINICAL HISTORY: abdominal pain; TECHNIQUE: AP View(s) of the abdomen was performed. COMPARISON: 08/05/2022 FINDINGS: Cholecystectomy clips.  11 mm oval metallic density left upper quadrant of the abdomen not reliably localized and likely snap or similar structure extrinsic to the patient.  Curvilinear calcifications cephalad to this is consistent calcification in the splenic artery.  Surgical clips also seen in the pelvis and vascular calcifications in the pelvis.  Gas and stool not colon.  No gross free intraperitoneal air on the supine film.  No abnormal distention of bowel.     No acute findings.  Findings as detailed above. Electronically signed by: Sunshine Awan MD Date:    10/06/2022 Time:    16:47    CT Head Without Contrast    Result Date: 10/23/2022   ADDENDUM #1 I directly communicated these findings to Dr. Antonina Novak at 2328 hours on 10/23/2022. Electronically signed by:  Emily Culver DO  10/23/2022 11:32 PM CDT Workstation: [id number]  ORIGINAL REPORT CT HEAD WITHOUT CONTRAST CLINICAL HISTORY: Neuro deficit, acute, stroke suspected COMPARISON: None. TECHNIQUE: Axial unenhanced CT imaging of the brain. Reformatted coronal and sagittal images obtained. This examination was performed according to our departmental dose optimization program, which includes automated exposure control, adjustment of the mA and/or kV according to patient size and/or use of iterative reconstruction technique. FINDINGS: The ventricles are normal in size and contour. Extra-axial fluid spaces are normal. There  is no edema, mass or midline shift. No acute infarction or hyperdense vessel. No abnormality seen within the cerebellar hemispheres or vermis. Fourth ventricle is midline. Prepontine cisterns are not effaced. Intraorbital contents are intact. Mild mucosal thickening within the maxillary sinuses. Imaged facial bones are intact. Intact calvarium and skull base. Partial opacification of the right and left mastoid air cells. Unremarkable scalp soft tissues. IMPRESSION: 1. No intracranial acute finding. 2. Mild sinus mucosal disease. 2. Minimal bilateral mastoid effusions. Electronically signed by:  Emily Culver DO  10/23/2022 11:23 PM CDT Workstation: [id number]    CT Abdomen Pelvis With Contrast    Result Date: 10/23/2022  PROCEDURE: CT ABDOMEN PELVIS WITH IV CONTRAST, 10/23/2022 10:48 PM CDT CLINICAL INDICATION:  LLQ abdominal pain. COMPARISON: 10/16/2022 TECHNIQUE: Helical CT acquisition performed of the abdomen and pelvis with coronal and sagittal reformats. IV Contrast: Nonionic iodinated contrast.  PO Contrast: None.  Complications: None. CT Dose reduction techniques were utilized for this examination with 1 or more of the following: Automated exposure control and/or adjustment of the mA or kV according to patient size, and/or use of iterative reconstruction technique. FINDINGS: Visualized chest base: Atelectasis versus infiltrates in lung bases. Hepatobiliary system: No suspicious lesions. Portal vein patent. Gallbladder: Surgically absent. Spleen: Normal size. Pancreas: No focal masses. No pancreatic ductal dilatation. No significant peripancreatic inflammatory changes. Adrenal glands: Normal. Kidneys and collecting system: No hydronephrosis. Mildly hyperemic appearance of ureteral urothelium at level of kidney on left. Bowel and mesentery: No pneumoperitoneum. No small bowel obstruction. Trace/small volume free fluid seen just below level of liver and in pelvis. Appendix: No evidence of appendicitis identified.  Pelvic organs: Distended urinary bladder with mild urinary bladder wall thickening. Uterus present. Bones/MSK: No acute displaced fracture. Diffuse body wall anasarca. IMPRESSION: Mildly hyperemic appearance of ureteral urothelium at level of kidney on left. Could reflect ureteritis. Correlate to assure no evidence of pyelonephritis. Distended urinary bladder with mild urinary bladder wall thickening suspicious for urinary tract infection or cystitis. Atelectasis versus infiltrate in lung bases. Diffuse body wall anasarca again present. Trace/small volume free fluid. Electronically signed by:  Jason Potts MD  10/23/2022 11:29 PM CDT Workstation: [id number]    CT Abdomen Pelvis With Contrast    Result Date: 10/16/2022  CMS MANDATED QUALITY DATA - CT RADIATION  436 All CT scans at this facility utilize dose modulation, iterative reconstruction, and/or weight based dosing when appropriate to reduce radiation dose to as low as reasonably achievable. CT ABDOMEN PELVIS WITH IV CONTRAST CLINICAL HISTORY: 32 years Female Abdominal pain, acute, nonlocalized COMPARISON: CT abdomen and pelvis August 25, 2022 FINDINGS: Images through the lower thorax demonstrate scattered groundglass opacities bilaterally. Bone window images show no acute or aggressive osseous abnormality. Diffuse body wall edema. No focal hepatic lesion. Gallbladder is absent. No bile duct dilation. Portal vein is patent. Spleen appears normal. Pancreas is unremarkable. No adrenal lesion. No renal lesion or hydronephrosis. Diminished perfusion of both kidneys suggesting renal dysfunction. Ureters are normal in caliber. Urinary bladder is collapsed. Diffuse gastric wall thickening. No evidence of small bowel obstruction. Diffuse colonic wall thickening. Diffuse mesenteric edema and small volume free fluid within the abdomen and pelvis. IMPRESSION: Diffuse body wall and mesenteric edema. Small volume free fluid in the abdomen and pelvis. Diminished perfusion of  both kidneys suggesting renal dysfunction. Diffuse edema/wall thickening of the stomach and colon, most likely on the basis of volume overload considering concurrent findings. Status post cholecystectomy. Electronically signed by:  Von Bland MD  10/16/2022 6:32 PM CDT Workstation: [id number]    X-Ray Chest AP Portable    Result Date: 10/26/2022  Reason: Chest pain. FINDINGS: Portable chest comparison chest x-ray August 23, 2022 show unchanged enlarged cardiomediastinal silhouette. Right internal jugular dialysis catheter is unchanged. Bilateral perihilar interstitial lung opacities are noted. Pulmonary vasculature is normal. No acute osseous abnormality. IMPRESSION: Bilateral perihilar interstitial lung opacities with enlarged cardiomediastinal silhouette could reflect CHF with pulmonary edema. Electronically signed by:  Juaquin Will DO  10/26/2022 1:30 PM CDT Workstation: [id number]    X-Ray Chest AP Portable    Result Date: 10/4/2022  EXAMINATION: XR CHEST AP PORTABLE CLINICAL HISTORY: hyperglycemia; TECHNIQUE: Single frontal view of the chest was performed. COMPARISON: 09/02/2022 FINDINGS: Bibasilar interstitial disease.  Enlarged cardiac silhouette.  Right-sided central line with tip projecting over the right heart border presumably in the right atrium.  No pleural effusion or pneumothorax.     Mild CHF, slightly improved from prior. Electronically signed by: Kaushik Gutierrez Date:    10/04/2022 Time:    09:42    CT Abdomen Pelvis  Without Contrast    Result Date: 10/26/2022  CMS MANDATED QUALITY DATA-CT RADIATION DOSE-436 All CT scans at this facility use dose modulation, iterative reconstruction, and or weight-based dosing when appropriate to reduce radiation dose to as low as reasonably achievable. HISTORY: LLQ abdominal pain FINDINGS: Noncontrast axial images were obtained. Nonenhanced study is tailored for the detection of urolithiasis, and is insensitive for abnormalities of the solid organs,  vasculature and hollow viscera.  Comparison to multiple prior exams. CT ABDOMEN: There is interlobular septal thickening at both lung bases, with patchy reticular and groundglass densities, and small low-density right pleural effusion. There is a moderate-sized low-density pericardial effusion, with hypodensity of the cardiac blood pool suggesting anemia. The unenhanced liver, spleen, pancreas and adrenal glands are unremarkable. There is persistent residual corticomedullary phase bilateral renal enhancement compared to 10/23/2022, reflecting some degree of renal insufficiency. No hydronephrosis. The abdominal aorta is normal in caliber. There is no bowel obstruction or intraperitoneal free air, with trace low-density ascites. There is diffuse reticular and sheetlike edema in the subcutaneous fat. CT PELVIS: There are pelvic arterial vascular calcifications, with no distal ureteral or bladder calculi. The unenhanced rectum, uterus and adnexa are unremarkable, with excreted contrast material within the urinary bladder. There is trace low-density pelvic ascites. There are scattered arterial vascular calcifications, with diffuse reticular and sheetlike edema in the subcutaneous fat, reflecting anasarca. There are no acute fractures or destructive osseous lesions, with diffuse mild appearance of the bones reflect renal osteodystrophy. IMPRESSION: 1. Opacities at both lung bases suggesting volume overload and or interstitial pulmonary edema, with superimposed multifocal viral or atypical pneumonia, or nonspecific pneumonitis not excluded. 2. Small right pleural effusion and moderate-sized pericardial effusion. 3. Trace ascites and anasarca. Electronically signed by:  Daniel Edmondson MD  10/26/2022 3:15 PM CDT Workstation: [id number]    CT Abdomen Pelvis  Without Contrast    Result Date: 10/4/2022  EXAMINATION: CT ABDOMEN PELVIS WITHOUT CONTRAST CLINICAL HISTORY: Nausea/vomiting; TECHNIQUE: Low dose axial images, sagittal  and coronal reformations were obtained from the lung bases to the pubic symphysis.  30 mL of oral Omnipaque 350 was administered. COMPARISON: Chest x-ray of October 4, 2022 FINDINGS: There is diffuse anasarca throughout the chest abdomen and pelvis.  There is a small right pleural effusion moderate pericardial effusion noted.  Small amount of ascites is seen adjacent to the liver, spleen and in the pelvis. The liver is of normal size and CT density.  The gallbladder is absent with surgical clips noted.  The visualized pancreas appears within normal limits.  The spleen is of normal size and CT density. The adrenal glands are not enlarged.  The kidneys are of normal size and CT density for a noncontrast study.  Stone or hydronephrosis is not seen.  The abdominal aorta and inferior vena cava are of normal caliber. The stomach is of normal configuration.  Small bowel dilatation or air-fluid levels are not seen.  The colon is of normal configuration with few diverticuli of the sigmoid colon without CT evidence of diverticulitis. Bladder is of normal contour without mass or asymmetry.  The uterus is within normal limits.     Diffuse anasarca.  Small right pleural effusion.  Moderate pericardial effusion.  Small amount of ascites adjacent to the liver, spleen and in the pelvis. Prior cholecystectomy.  Mild diverticulosis coli. Electronically signed by: Gianluca Arriaga MD Date:    10/04/2022 Time:    11:00       Assessment/Plan:     Active Hospital Problems    Diagnosis    *Volume overload with pulmonary edema    Chest pain    Hypokalemia    Hyperglycemia    Hyponatremia    Anemia, acute on chronic    Thrombocytopenia    Chronic abdominal pain    HTN (hypertension)    Deep vein thrombosis (DVT) of non-extremity vein    ESRD (end stage renal disease)    Gastroparesis    Sickle cell trait    Pericardial effusion    Type 2 insulin-dependent diabetes mellitus, previous HbA1c 6.2%, with hyperglycemia    SVT  (supraventricular tachycardia)     Plan:  Admit observation, medical floor with remote telemetry monitoring   Chest x-ray with pulmonary edema with a BNP greater than 4K, additional dialysis session today for volume removal  Acute on chronic anemia without definite evidence of ongoing blood loss,1 PRBC transfusion with dialysis as per Nephrology  Check anemia labs including B12, folic acid, iron studies   Previous echo with moderate size pericardial effusion, repeat limited echo for evaluation   Serial troponin for completeness  Hyperglycemia, unclear if compliant with insulin, ordered 5 units short-acting now, 10 units long-acting q.h.s. and insulin sliding scale t.i.d. with meals and Accu-Cheks.  As needed hypoglycemic measures  40 mEq oral potassium supplementation on trending  Resume home antihypertensive and monitoring blood pressure   Patient states she does not produce urine, discontinue diuretic therapy  Serial abdominal examination  Renally dosing all medications and avoiding nephrotoxin drugs  A.m. labs ordered  Nephrology consulted in the ED   Further plan as per clinical course    VTE Risk Mitigation (From admission, onward)    None             Suzy Bobo MD  Department of Hospital Medicine   [facility] - Emergency Dept

## 2022-10-26 NOTE — SUBJECTIVE & OBJECTIVE
Past Medical History:   Diagnosis Date    CKD (chronic kidney disease), stage IV 2022    Diabetes mellitus due to underlying condition with unspecified complications 2022    Gastroparesis 2022    Heart failure with preserved ejection fraction 2022    EF 55% on 3/22    History of gastroesophageal reflux (GERD)     History of supraventricular tachycardia     Hyperkalemia 2022    Hypertensive emergency 2022    Sickle cell trait 2022    Type 2 diabetes mellitus        Past Surgical History:   Procedure Laterality Date     SECTION      x 3    COLONOSCOPY      COLONOSCOPY N/A 2022    Procedure: COLONOSCOPY;  Surgeon: Jagdeep Cedeno MD;  Location: [facility];  Service: Endoscopy;  Laterality: N/A;    ESOPHAGOGASTRODUODENOSCOPY N/A 10/18/2019    Procedure: ESOPHAGOGASTRODUODENOSCOPY (EGD);  Surgeon: Gianluca Mendez MD;  Location: [facility];  Service: Endoscopy;  Laterality: N/A;    ESOPHAGOGASTRODUODENOSCOPY N/A 2022    Procedure: EGD (ESOPHAGOGASTRODUODENOSCOPY);  Surgeon: Micky Paredes III, MD;  Location: [facility];  Service: Endoscopy;  Laterality: N/A;    LAPAROSCOPIC CHOLECYSTECTOMY N/A 2022    Procedure: CHOLECYSTECTOMY, LAPAROSCOPIC;  Surgeon: Grey Perez MD;  Location: [facility];  Service: General;  Laterality: N/A;    PLACEMENT OF DUAL-LUMEN VASCULAR CATHETER Left 2022    Procedure: INSERTION-CATHETER-JOSEPH;  Surgeon: Dionte Gan MD;  Location: [facility] OR;  Service: General;  Laterality: Left;    PLACEMENT OF DUAL-LUMEN VASCULAR CATHETER Right 2022    Procedure: INSERTION-CATHETER-Hemosplit;  Surgeon: Dionte Gan MD;  Location: [facility] OR;  Service: General;  Laterality: Right;       Review of patient's allergies indicates:   Allergen Reactions    Penicillins Hives       No current facility-administered medications on file prior to encounter.     Current Outpatient Medications on File Prior to Encounter   Medication Sig    amLODIPine  (NORVASC) 10 MG tablet Take 1 tablet (10 mg total) by mouth once daily.    apixaban (ELIQUIS) 5 mg Tab Take 1 tablet (5 mg total) by mouth 2 (two) times daily. For second month on.    cloNIDine 0.2 mg/24 hr td ptwk (CATAPRES) 0.2 mg/24 hr Place 1 patch onto the skin every 7 days.    albuterol (PROVENTIL/VENTOLIN HFA) 90 mcg/actuation inhaler Inhale 2 puffs into the lungs every 6 (six) hours as needed for Wheezing. Rescue    carvediloL (COREG) 25 MG tablet Take 1 tablet (25 mg total) by mouth 2 (two) times daily.    famotidine (PEPCID) 20 MG tablet Take 1 tablet (20 mg total) by mouth once daily.    FLUoxetine 20 MG capsule Take 1 capsule (20 mg total) by mouth once daily.    furosemide (LASIX) 40 MG tablet Take 1 tablet (40 mg total) by mouth 2 (two) times daily.    hydrALAZINE (APRESOLINE) 100 MG tablet Take 1 tablet (100 mg total) by mouth every 8 (eight) hours.    insulin aspart U-100 (NOVOLOG) 100 unit/mL (3 mL) InPn pen Inject 1-10 Units into the skin before meals and at bedtime as needed (Hyperglycemia).    isosorbide mononitrate (IMDUR) 60 MG 24 hr tablet Take 2 tablets (120 mg total) by mouth once daily.    LANTUS U-100 INSULIN 100 unit/mL injection Inject 5 Units into the skin once daily.    levETIRAcetam (KEPPRA) 500 MG Tab Take 1 tablet (500 mg total) by mouth 2 (two) times daily.    metoclopramide HCl (REGLAN) 10 MG tablet Take 1 tablet (10 mg total) by mouth every 6 (six) hours.    ondansetron (ZOFRAN-ODT) 4 MG TbDL Take 1 tablet (4 mg total) by mouth every 8 (eight) hours as needed (nausea, vomiting).    pantoprazole (PROTONIX) 40 MG tablet Take 1 tablet (40 mg total) by mouth once daily.    vancomycin (VANCOCIN) 125 MG capsule TAKE 1 CAPSULE BY MOUTH FOUR TIMES DAILY FOR 10 DAYS (Patient taking differently: Take 125 mg by mouth 4 (four) times daily.)    [DISCONTINUED] atenoloL (TENORMIN) 50 MG tablet Take 1 tablet (50 mg total) by mouth every other day.    [DISCONTINUED] omeprazole (PRILOSEC) 20 MG  capsule Take 2 capsules (40 mg total) by mouth once daily. for 10 days    [DISCONTINUED] torsemide (DEMADEX) 20 MG Tab Take 1 tablet (20 mg total) by mouth 2 (two) times a day. (Patient taking differently: Take 20 mg by mouth once daily.)     Family History       Problem Relation (Age of Onset)    Diabetes Mother, Father          Tobacco Use    Smoking status: Never    Smokeless tobacco: Never   Substance and Sexual Activity    Alcohol use: Not Currently    Drug use: No    Sexual activity: Not Currently     Partners: Male     Birth control/protection: I.U.D.     Review of Systems   Constitutional:  Negative for fever.   HENT:  Negative for congestion.    Respiratory:  Positive for cough and shortness of breath.    Cardiovascular:  Positive for chest pain.   Gastrointestinal:  Positive for abdominal pain. Negative for diarrhea.   Genitourinary:         No longer produces urine   Musculoskeletal:  Positive for back pain.   Skin:  Negative for wound.   Neurological:  Negative for seizures and speech difficulty.   Psychiatric/Behavioral:  Negative for behavioral problems.    Objective:     Vital Signs (Most Recent):  Temp: 98.9 °F (37.2 °C) (10/26/22 1151)  Pulse: 107 (10/26/22 1530)  Resp: (!) 22 (10/26/22 1540)  BP: (!) 183/106 (10/26/22 1530)  SpO2: 96 % (10/26/22 1530)   Vital Signs (24h Range):  Temp:  [98.9 °F (37.2 °C)] 98.9 °F (37.2 °C)  Pulse:  [102-110] 107  Resp:  [18-22] 22  SpO2:  [96 %-100 %] 96 %  BP: (169-184)/() 183/106     Weight: 59 kg (130 lb)  Body mass index is 23.78 kg/m².    Physical Exam  Vitals and nursing note reviewed.   Constitutional:       Comments: Lying in stretcher, cooperative   HENT:      Head: Normocephalic and atraumatic.      Mouth/Throat:      Mouth: Mucous membranes are moist.   Eyes:      General:         Right eye: No discharge.         Left eye: No discharge.      Conjunctiva/sclera: Conjunctivae normal.   Cardiovascular:      Rate and Rhythm: Regular rhythm. Tachycardia  present.      Comments: Warm peripheries, no LE edema  Pulmonary:      Comments: On NC, inspiratory crackles at bases, no wheeze, no accessory muscle use  Abdominal:      Comments: Non distended, soft, tenderness to palpation all quadrant, some distractibility, no peritoneal signs, +BS   Genitourinary:     Comments: No [place]  Musculoskeletal:      Cervical back: Neck supple.   Skin:     Comments: Right chest wall dialysis catheter with dressing overlying   Neurological:      Mental Status: She is alert and oriented to person, place, and time.      Comments: Speech intact, no of his/Septra, following simple commands, moving all 4 extremities   Psychiatric:         Mood and Affect: Mood normal.           Significant Labs: BMP:   Recent Labs   Lab 10/26/22  1235   *   *   K 2.9*   CL 93*   CO2 31*   BUN 23*   CREATININE 3.0*   CALCIUM 8.0*     CBC:   Recent Labs   Lab 10/26/22  1235   WBC 7.65   HGB 7.6*   HCT 21.7*   PLT 74*     CMP:   Recent Labs   Lab 10/26/22  1235   *   K 2.9*   CL 93*   CO2 31*   *   BUN 23*   CREATININE 3.0*   CALCIUM 8.0*   PROT 6.2   ALBUMIN 2.8*   BILITOT 2.3*   ALKPHOS 91   AST 22   ALT 13   ANIONGAP 8     Cardiac Markers:   Recent Labs   Lab 10/26/22  1235   BNP 4,138*     Lactic Acid: No results for input(s): LACTATE in the last 48 hours.  POCT Glucose: No results for input(s): POCTGLUCOSE in the last 48 hours.  Respiratory Culture: No results for input(s): GSRESP, RESPIRATORYC in the last 48 hours.  Troponin:   Recent Labs   Lab 10/26/22  1235   TROPONINI 0.056*     TSH:   Recent Labs   Lab 07/08/22  1517   TSH 1.866     Urine Culture: No results for input(s): LABURIN in the last 48 hours.  Urine Studies: No results for input(s): COLORU, APPEARANCEUA, PHUR, SPECGRAV, PROTEINUA, GLUCUA, KETONESU, BILIRUBINUA, OCCULTUA, NITRITE, UROBILINOGEN, LEUKOCYTESUR, RBCUA, WBCUA, BACTERIA, SQUAMEPITHEL, HYALINECASTS in the last 48 hours.    Invalid input(s):  WRIGHTSUR    Significant Imaging: I have reviewed all pertinent imaging results/findings within the past 24 hours.    X-Ray Abdomen AP 1 View    Result Date: 10/6/2022  EXAMINATION: XR ABDOMEN AP 1 VIEW CLINICAL HISTORY: abdominal pain; TECHNIQUE: AP View(s) of the abdomen was performed. COMPARISON: 08/05/2022 FINDINGS: Cholecystectomy clips.  11 mm oval metallic density left upper quadrant of the abdomen not reliably localized and likely snap or similar structure extrinsic to the patient.  Curvilinear calcifications cephalad to this is consistent calcification in the splenic artery.  Surgical clips also seen in the pelvis and vascular calcifications in the pelvis.  Gas and stool not colon.  No gross free intraperitoneal air on the supine film.  No abnormal distention of bowel.     No acute findings.  Findings as detailed above. Electronically signed by: Sunshine Awan MD Date:    10/06/2022 Time:    16:47    CT Head Without Contrast    Result Date: 10/23/2022   ADDENDUM #1 I directly communicated these findings to Dr. Antonina Novak at 2328 hours on 10/23/2022. Electronically signed by:  Emily Culver DO  10/23/2022 11:32 PM CDT Workstation: [id number]  ORIGINAL REPORT CT HEAD WITHOUT CONTRAST CLINICAL HISTORY: Neuro deficit, acute, stroke suspected COMPARISON: None. TECHNIQUE: Axial unenhanced CT imaging of the brain. Reformatted coronal and sagittal images obtained. This examination was performed according to our departmental dose optimization program, which includes automated exposure control, adjustment of the mA and/or kV according to patient size and/or use of iterative reconstruction technique. FINDINGS: The ventricles are normal in size and contour. Extra-axial fluid spaces are normal. There is no edema, mass or midline shift. No acute infarction or hyperdense vessel. No abnormality seen within the cerebellar hemispheres or vermis. Fourth ventricle is midline. Prepontine cisterns are not effaced.  Intraorbital contents are intact. Mild mucosal thickening within the maxillary sinuses. Imaged facial bones are intact. Intact calvarium and skull base. Partial opacification of the right and left mastoid air cells. Unremarkable scalp soft tissues. IMPRESSION: 1. No intracranial acute finding. 2. Mild sinus mucosal disease. 2. Minimal bilateral mastoid effusions. Electronically signed by:  Emily Culver   10/23/2022 11:23 PM CDT Workstation: [id number]    CT Abdomen Pelvis With Contrast    Result Date: 10/23/2022  PROCEDURE: CT ABDOMEN PELVIS WITH IV CONTRAST, 10/23/2022 10:48 PM CDT CLINICAL INDICATION:  LLQ abdominal pain. COMPARISON: 10/16/2022 TECHNIQUE: Helical CT acquisition performed of the abdomen and pelvis with coronal and sagittal reformats. IV Contrast: Nonionic iodinated contrast.  PO Contrast: None.  Complications: None. CT Dose reduction techniques were utilized for this examination with 1 or more of the following: Automated exposure control and/or adjustment of the mA or kV according to patient size, and/or use of iterative reconstruction technique. FINDINGS: Visualized chest base: Atelectasis versus infiltrates in lung bases. Hepatobiliary system: No suspicious lesions. Portal vein patent. Gallbladder: Surgically absent. Spleen: Normal size. Pancreas: No focal masses. No pancreatic ductal dilatation. No significant peripancreatic inflammatory changes. Adrenal glands: Normal. Kidneys and collecting system: No hydronephrosis. Mildly hyperemic appearance of ureteral urothelium at level of kidney on left. Bowel and mesentery: No pneumoperitoneum. No small bowel obstruction. Trace/small volume free fluid seen just below level of liver and in pelvis. Appendix: No evidence of appendicitis identified. Pelvic organs: Distended urinary bladder with mild urinary bladder wall thickening. Uterus present. Bones/MSK: No acute displaced fracture. Diffuse body wall anasarca. IMPRESSION: Mildly hyperemic appearance of  ureteral urothelium at level of kidney on left. Could reflect ureteritis. Correlate to assure no evidence of pyelonephritis. Distended urinary bladder with mild urinary bladder wall thickening suspicious for urinary tract infection or cystitis. Atelectasis versus infiltrate in lung bases. Diffuse body wall anasarca again present. Trace/small volume free fluid. Electronically signed by:  Jason Potts MD  10/23/2022 11:29 PM CDT Workstation: [id number]    CT Abdomen Pelvis With Contrast    Result Date: 10/16/2022  CMS MANDATED QUALITY DATA - CT RADIATION  436 All CT scans at this facility utilize dose modulation, iterative reconstruction, and/or weight based dosing when appropriate to reduce radiation dose to as low as reasonably achievable. CT ABDOMEN PELVIS WITH IV CONTRAST CLINICAL HISTORY: 32 years Female Abdominal pain, acute, nonlocalized COMPARISON: CT abdomen and pelvis August 25, 2022 FINDINGS: Images through the lower thorax demonstrate scattered groundglass opacities bilaterally. Bone window images show no acute or aggressive osseous abnormality. Diffuse body wall edema. No focal hepatic lesion. Gallbladder is absent. No bile duct dilation. Portal vein is patent. Spleen appears normal. Pancreas is unremarkable. No adrenal lesion. No renal lesion or hydronephrosis. Diminished perfusion of both kidneys suggesting renal dysfunction. Ureters are normal in caliber. Urinary bladder is collapsed. Diffuse gastric wall thickening. No evidence of small bowel obstruction. Diffuse colonic wall thickening. Diffuse mesenteric edema and small volume free fluid within the abdomen and pelvis. IMPRESSION: Diffuse body wall and mesenteric edema. Small volume free fluid in the abdomen and pelvis. Diminished perfusion of both kidneys suggesting renal dysfunction. Diffuse edema/wall thickening of the stomach and colon, most likely on the basis of volume overload considering concurrent findings. Status post cholecystectomy.  Electronically signed by:  Von Bland MD  10/16/2022 6:32 PM CDT Workstation: [id number]    X-Ray Chest AP Portable    Result Date: 10/26/2022  Reason: Chest pain. FINDINGS: Portable chest comparison chest x-ray August 23, 2022 show unchanged enlarged cardiomediastinal silhouette. Right internal jugular dialysis catheter is unchanged. Bilateral perihilar interstitial lung opacities are noted. Pulmonary vasculature is normal. No acute osseous abnormality. IMPRESSION: Bilateral perihilar interstitial lung opacities with enlarged cardiomediastinal silhouette could reflect CHF with pulmonary edema. Electronically signed by:  Juaquin Will DO  10/26/2022 1:30 PM CDT Workstation: [id number]    X-Ray Chest AP Portable    Result Date: 10/4/2022  EXAMINATION: XR CHEST AP PORTABLE CLINICAL HISTORY: hyperglycemia; TECHNIQUE: Single frontal view of the chest was performed. COMPARISON: 09/02/2022 FINDINGS: Bibasilar interstitial disease.  Enlarged cardiac silhouette.  Right-sided central line with tip projecting over the right heart border presumably in the right atrium.  No pleural effusion or pneumothorax.     Mild CHF, slightly improved from prior. Electronically signed by: Kaushik Gutierrez Date:    10/04/2022 Time:    09:42    CT Abdomen Pelvis  Without Contrast    Result Date: 10/26/2022  CMS MANDATED QUALITY DATA-CT RADIATION DOSE-436 All CT scans at this facility use dose modulation, iterative reconstruction, and or weight-based dosing when appropriate to reduce radiation dose to as low as reasonably achievable. HISTORY: LLQ abdominal pain FINDINGS: Noncontrast axial images were obtained. Nonenhanced study is tailored for the detection of urolithiasis, and is insensitive for abnormalities of the solid organs, vasculature and hollow viscera.  Comparison to multiple prior exams. CT ABDOMEN: There is interlobular septal thickening at both lung bases, with patchy reticular and groundglass densities, and small low-density  right pleural effusion. There is a moderate-sized low-density pericardial effusion, with hypodensity of the cardiac blood pool suggesting anemia. The unenhanced liver, spleen, pancreas and adrenal glands are unremarkable. There is persistent residual corticomedullary phase bilateral renal enhancement compared to 10/23/2022, reflecting some degree of renal insufficiency. No hydronephrosis. The abdominal aorta is normal in caliber. There is no bowel obstruction or intraperitoneal free air, with trace low-density ascites. There is diffuse reticular and sheetlike edema in the subcutaneous fat. CT PELVIS: There are pelvic arterial vascular calcifications, with no distal ureteral or bladder calculi. The unenhanced rectum, uterus and adnexa are unremarkable, with excreted contrast material within the urinary bladder. There is trace low-density pelvic ascites. There are scattered arterial vascular calcifications, with diffuse reticular and sheetlike edema in the subcutaneous fat, reflecting anasarca. There are no acute fractures or destructive osseous lesions, with diffuse mild appearance of the bones reflect renal osteodystrophy. IMPRESSION: 1. Opacities at both lung bases suggesting volume overload and or interstitial pulmonary edema, with superimposed multifocal viral or atypical pneumonia, or nonspecific pneumonitis not excluded. 2. Small right pleural effusion and moderate-sized pericardial effusion. 3. Trace ascites and anasarca. Electronically signed by:  Daniel Edmondson MD  10/26/2022 3:15 PM CDT Workstation: [id number]    CT Abdomen Pelvis  Without Contrast    Result Date: 10/4/2022  EXAMINATION: CT ABDOMEN PELVIS WITHOUT CONTRAST CLINICAL HISTORY: Nausea/vomiting; TECHNIQUE: Low dose axial images, sagittal and coronal reformations were obtained from the lung bases to the pubic symphysis.  30 mL of oral Omnipaque 350 was administered. COMPARISON: Chest x-ray of October 4, 2022 FINDINGS: There is diffuse anasarca  throughout the chest abdomen and pelvis.  There is a small right pleural effusion moderate pericardial effusion noted.  Small amount of ascites is seen adjacent to the liver, spleen and in the pelvis. The liver is of normal size and CT density.  The gallbladder is absent with surgical clips noted.  The visualized pancreas appears within normal limits.  The spleen is of normal size and CT density. The adrenal glands are not enlarged.  The kidneys are of normal size and CT density for a noncontrast study.  Stone or hydronephrosis is not seen.  The abdominal aorta and inferior vena cava are of normal caliber. The stomach is of normal configuration.  Small bowel dilatation or air-fluid levels are not seen.  The colon is of normal configuration with few diverticuli of the sigmoid colon without CT evidence of diverticulitis. Bladder is of normal contour without mass or asymmetry.  The uterus is within normal limits.     Diffuse anasarca.  Small right pleural effusion.  Moderate pericardial effusion.  Small amount of ascites adjacent to the liver, spleen and in the pelvis. Prior cholecystectomy.  Mild diverticulosis coli. Electronically signed by: Gianluca Arriaga MD Date:    10/04/2022 Time:    11:00

## 2022-10-26 NOTE — CONSULTS
Consult Note  Nephrology    Consult Requested By: Edgard Pittman MD    Reason for Consult: ESRD, dependent on dialysis    SUBJECTIVE:     History of Present Illness:  31 y/o female patient known to our practice, has out patient dialysis 3x wk on TTS schedule.  She did go to her dialysis tx yesterday and was seen by me.  She presents to ER today w/complaint of chest pain and abd pain.  Has electrolyte derrangements, BNP >4000.  Nephrology is consulted for dialysis orders.    Assessment/plan:    ESRD  FVO  Anemia of chronic dz  SHPT  Hyponatremia, metabolic alkalosis, hypokalemia    --will do additional HD today, then resume TTS schedule tomorrow  --UF 3-4L as tolerated  --will order a unit of blood w/HD today.  Pt having chest pain  --adjust dialysate as needed to correct electrolyte derrangements    Past Medical History:   Diagnosis Date    CKD (chronic kidney disease), stage IV 2022    Diabetes mellitus due to underlying condition with unspecified complications 2022    Gastroparesis 2022    Heart failure with preserved ejection fraction 2022    EF 55% on 3/22    History of gastroesophageal reflux (GERD)     History of supraventricular tachycardia     Hyperkalemia 2022    Hypertensive emergency 2022    Sickle cell trait 2022    Type 2 diabetes mellitus      Past Surgical History:   Procedure Laterality Date     SECTION      x 3    COLONOSCOPY      COLONOSCOPY N/A 2022    Procedure: COLONOSCOPY;  Surgeon: Jagdeep Cedeno MD;  Location: [facility];  Service: Endoscopy;  Laterality: N/A;    ESOPHAGOGASTRODUODENOSCOPY N/A 10/18/2019    Procedure: ESOPHAGOGASTRODUODENOSCOPY (EGD);  Surgeon: Gianluca Mendez MD;  Location: [facility];  Service: Endoscopy;  Laterality: N/A;    ESOPHAGOGASTRODUODENOSCOPY N/A 2022    Procedure: EGD (ESOPHAGOGASTRODUODENOSCOPY);  Surgeon: Micky Paredes III, MD;  Location: [facility];  Service: Endoscopy;  Laterality: N/A;    LAPAROSCOPIC  CHOLECYSTECTOMY N/A 07/30/2022    Procedure: CHOLECYSTECTOMY, LAPAROSCOPIC;  Surgeon: Grey Perez MD;  Location: [facility] OR;  Service: General;  Laterality: N/A;    PLACEMENT OF DUAL-LUMEN VASCULAR CATHETER Left 07/12/2022    Procedure: INSERTION-CATHETER-JOSEPH;  Surgeon: Dionte Gan MD;  Location: [facility] OR;  Service: General;  Laterality: Left;    PLACEMENT OF DUAL-LUMEN VASCULAR CATHETER Right 07/26/2022    Procedure: INSERTION-CATHETER-Hemosplit;  Surgeon: Dionte Gan MD;  Location: [facility] OR;  Service: General;  Laterality: Right;     Family History   Problem Relation Age of Onset    Diabetes Mother     Diabetes Father      Social History     Tobacco Use    Smoking status: Never    Smokeless tobacco: Never   Substance Use Topics    Alcohol use: No    Drug use: No       Review of patient's allergies indicates:   Allergen Reactions    Penicillins Hives        Review of Systems:  General ROS: negative for - fever or night sweats  Psychological ROS: negative for - behavioral disorder or depression  ENT ROS: negative for - headaches or visual changes  Hematological and Lymphatic ROS: negative for - bleeding problems or bruising  Endocrine ROS: negative for - temperature intolerance or unexpected weight changes  Respiratory ROS: no cough, shortness of breath, or wheezing  Cardiovascular ROS: no chest pain, SOB  Gastrointestinal ROS:  abd pain  Genito-Urinary ROS: no dysuria or trouble voiding  Musculoskeletal ROS: negative for - joint pain or joint swelling  Neurological ROS: no TIA or stroke symptoms  Dermatological ROS: negative for rash and skin lesion changes    OBJECTIVE:     Vital Signs Range (Last 24H):  Temp:  [98.9 °F (37.2 °C)]   Pulse:  [102-105]   Resp:  [18]   BP: (179-184)/()   SpO2:  [96 %-100 %]     Physical Exam:  General- NAD noted  HEENT- WNL  Neck- supple  CV- Regular rate and rhythm  Resp- Lungs CTA Bilaterally, No increased WOB  GI- Non tender/non-distended, BS normoactive x4  quads  Extrem- No cyanosis, clubbing, edema.  Derm- skin w/d  Neuro-  No flap.     Body mass index is 23.78 kg/m².    Laboratory:  CBC:   Recent Labs   Lab 10/26/22  1235   WBC 7.65   RBC 2.63*   HGB 7.6*   HCT 21.7*   PLT 74*   MCV 83   MCH 28.9   MCHC 35.0     CMP:   Recent Labs   Lab 10/26/22  1235   *   CALCIUM 8.0*   ALBUMIN 2.8*   PROT 6.2   *   K 2.9*   CO2 31*   CL 93*   BUN 23*   CREATININE 3.0*   ALKPHOS 91   ALT 13   AST 22   BILITOT 2.3*       Diagnostic Results:  Labs: Reviewed      ASSESSMENT/PLAN:     There are no hospital problems to display for this patient.        Thank you for allowing us to participate in the care of your patient. We will follow the patient and provide recommendations as needed.      Time spent seeing patient( greater than 1/2 spent in direct contact) :     Geeta Kaur NP

## 2022-10-26 NOTE — ED NOTES
Pt here with a c/o continued abd pain. Pt states she had dialysis yesterday with 2 liters removed. Pt is c/o left sided chest pain/pressure. Sob and lower abd pain. Pt states this all started this morning.

## 2022-10-26 NOTE — ED PROVIDER NOTES
Encounter Date: 10/26/2022       History     Chief Complaint   Patient presents with    Abdominal Pain     32-year-old female with a history of gastroparesis, CHF, diabetes, chronic kidney disease sickle cell trait, type 2 diabetes presents today with abdominal pain nausea and vomiting has a history of multiple presentations in the past for similar symptoms.  Patient denies trauma patient denies fever cough or any other acute complaints at this time patient localizes pain to the left lower quadrant she describes pain as sharp and rates it as 9/10 patient reports that pain is constant patient reports that pain started today.    Review of patient's allergies indicates:   Allergen Reactions    Penicillins Hives     Past Medical History:   Diagnosis Date    CKD (chronic kidney disease), stage IV 2022    Diabetes mellitus due to underlying condition with unspecified complications 2022    Gastroparesis 2022    Heart failure with preserved ejection fraction 2022    EF 55% on 3/22    History of gastroesophageal reflux (GERD)     History of supraventricular tachycardia     Hyperkalemia 2022    Hypertensive emergency 2022    Sickle cell trait 2022    Type 2 diabetes mellitus      Past Surgical History:   Procedure Laterality Date     SECTION      x 3    COLONOSCOPY      COLONOSCOPY N/A 2022    Procedure: COLONOSCOPY;  Surgeon: Jagdeep Cedeno MD;  Location: [facility];  Service: Endoscopy;  Laterality: N/A;    ESOPHAGOGASTRODUODENOSCOPY N/A 10/18/2019    Procedure: ESOPHAGOGASTRODUODENOSCOPY (EGD);  Surgeon: Gianluca Mendez MD;  Location: [facility];  Service: Endoscopy;  Laterality: N/A;    ESOPHAGOGASTRODUODENOSCOPY N/A 2022    Procedure: EGD (ESOPHAGOGASTRODUODENOSCOPY);  Surgeon: Micky Paredes III, MD;  Location: [facility];  Service: Endoscopy;  Laterality: N/A;    LAPAROSCOPIC CHOLECYSTECTOMY N/A 2022    Procedure: CHOLECYSTECTOMY, LAPAROSCOPIC;  Surgeon:  Grey Perez MD;  Location: [facility] OR;  Service: General;  Laterality: N/A;    PLACEMENT OF DUAL-LUMEN VASCULAR CATHETER Left 07/12/2022    Procedure: INSERTION-CATHETER-JOSEPH;  Surgeon: Dionte Gan MD;  Location: [facility] OR;  Service: General;  Laterality: Left;    PLACEMENT OF DUAL-LUMEN VASCULAR CATHETER Right 07/26/2022    Procedure: INSERTION-CATHETER-Hemosplit;  Surgeon: Dionte Gan MD;  Location: [facility] OR;  Service: General;  Laterality: Right;     Family History   Problem Relation Age of Onset    Diabetes Mother     Diabetes Father      Social History     Tobacco Use    Smoking status: Never    Smokeless tobacco: Never   Substance Use Topics    Alcohol use: Not Currently    Drug use: No     Review of Systems   Constitutional:  Negative for fever.   HENT:  Negative for congestion, rhinorrhea, sore throat and trouble swallowing.    Eyes:  Negative for visual disturbance.   Respiratory:  Negative for cough, chest tightness, shortness of breath and wheezing.    Cardiovascular:  Negative for chest pain, palpitations and leg swelling.   Gastrointestinal:  Positive for abdominal pain, nausea and vomiting. Negative for abdominal distention, constipation and diarrhea.   Genitourinary:  Negative for difficulty urinating, dysuria, flank pain and frequency.   Musculoskeletal:  Negative for arthralgias, back pain, joint swelling and neck pain.   Skin:  Negative for color change and rash.   Neurological:  Negative for dizziness, syncope, speech difficulty, weakness, numbness and headaches.   All other systems reviewed and are negative.    Physical Exam     Initial Vitals   BP Pulse Resp Temp SpO2   10/26/22 1149 10/26/22 1149 10/26/22 1149 10/26/22 1151 10/26/22 1149   (!) 179/108 105 18 98.9 °F (37.2 °C) 100 %      MAP       --                Physical Exam    Nursing note and vitals reviewed.  Constitutional: She appears well-developed and well-nourished. She is not diaphoretic. No distress.   HENT:   Head:  Normocephalic and atraumatic.   Right Ear: External ear normal.   Left Ear: External ear normal.   Nose: Nose normal.   Mouth/Throat: Oropharynx is clear and moist. No oropharyngeal exudate.   Eyes: Conjunctivae and EOM are normal. Pupils are equal, round, and reactive to light. Right eye exhibits no discharge. Left eye exhibits no discharge. No scleral icterus.   Neck: Neck supple. No thyromegaly present. No tracheal deviation present. No JVD present.   Normal range of motion.  Cardiovascular:  Normal rate, regular rhythm, normal heart sounds and intact distal pulses.     Exam reveals no gallop and no friction rub.       No murmur heard.  Pulmonary/Chest: Breath sounds normal. No stridor. No respiratory distress. She has no wheezes. She has no rhonchi. She has no rales. She exhibits no tenderness.   Abdominal: Abdomen is soft. Bowel sounds are normal. She exhibits no distension and no mass. There is abdominal tenderness. There is no rebound and no guarding.   Musculoskeletal:         General: No tenderness or edema. Normal range of motion.      Cervical back: Normal range of motion and neck supple.     Lymphadenopathy:     She has no cervical adenopathy.   Neurological: She is alert and oriented to person, place, and time. She has normal strength. No cranial nerve deficit or sensory deficit.   Skin: Skin is warm and dry. No rash and no abscess noted. No erythema. No pallor.       ED Course   Procedures  Labs Reviewed   CBC W/ AUTO DIFFERENTIAL - Abnormal; Notable for the following components:       Result Value    RBC 2.63 (*)     Hemoglobin 7.6 (*)     Hematocrit 21.7 (*)     Platelets 74 (*)     Gran % 77.0 (*)     Lymph % 13.2 (*)     All other components within normal limits   COMPREHENSIVE METABOLIC PANEL - Abnormal; Notable for the following components:    Sodium 132 (*)     Potassium 2.9 (*)     Chloride 93 (*)     CO2 31 (*)     Glucose 448 (*)     BUN 23 (*)     Creatinine 3.0 (*)     Calcium 8.0 (*)      Albumin 2.8 (*)     Total Bilirubin 2.3 (*)     eGFR 20.4 (*)     All other components within normal limits    Narrative:     K critical result(s) repeated. Called and verbal readback obtained   from Dr. Pittman/ED by JBSanam 10/26/2022 13:35   B-TYPE NATRIURETIC PEPTIDE - Abnormal; Notable for the following components:    BNP 4,138 (*)     All other components within normal limits   TROPONIN I - Abnormal; Notable for the following components:    Troponin I 0.056 (*)     All other components within normal limits   RETICULOCYTES - Abnormal; Notable for the following components:    Retic 3.5 (*)     All other components within normal limits   PROTIME-INR   SARS-COV-2 RNA AMPLIFICATION, QUAL   INFLUENZA A AND B ANTIGEN    Narrative:     Specimen Source->Nasopharyngeal Swab   RETICULOCYTES   LIPASE   HCG, QUANTITATIVE   HCG, QUANTITATIVE   LIPASE   MAGNESIUM   VITAMIN B12   FOLATE   FERRITIN   IRON AND TIBC   TRANSFERRIN   MAGNESIUM   URINALYSIS, REFLEX TO URINE CULTURE   TROPONIN I   TYPE & SCREEN   PREPARE RBC SOFT        ECG Results              EKG 12-lead (In process)  Result time 10/26/22 13:13:03      In process by Interface, Lab In [facility] (10/26/22 13:13:03)                   Narrative:    Test Reason : R07.9,    Vent. Rate : 108 BPM     Atrial Rate : 108 BPM     P-R Int : 178 ms          QRS Dur : 084 ms      QT Int : 362 ms       P-R-T Axes : 038 -30 022 degrees     QTc Int : 485 ms    Sinus tachycardia  Left axis deviation  Abnormal ECG  When compared with ECG of 16-OCT-2022 14:29,  No significant change was found    Referred By:             Confirmed By:                                   Imaging Results              CT Abdomen Pelvis  Without Contrast (Final result)  Result time 10/26/22 15:15:32      Final result by Daniel Edmondson MD (10/26/22 15:15:32)                   Narrative:    CMS MANDATED QUALITY DATA-CT RADIATION DOSE-436  All CT scans at this facility use dose modulation, iterative reconstruction,  and or weight-based dosing when appropriate to reduce radiation dose to as low as reasonably achievable.    HISTORY: LLQ abdominal pain    FINDINGS: Noncontrast axial images were obtained. Nonenhanced study is tailored for the detection of urolithiasis, and is insensitive for abnormalities of the solid organs, vasculature and hollow viscera.  Comparison to multiple prior exams.    CT ABDOMEN: There is interlobular septal thickening at both lung bases, with patchy reticular and groundglass densities, and small low-density right pleural effusion. There is a moderate-sized low-density pericardial effusion, with hypodensity of the cardiac blood pool suggesting anemia.    The unenhanced liver, spleen, pancreas and adrenal glands are unremarkable. There is persistent residual corticomedullary phase bilateral renal enhancement compared to 10/23/2022, reflecting some degree of renal insufficiency. No hydronephrosis. The abdominal aorta is normal in caliber. There is no bowel obstruction or intraperitoneal free air, with trace low-density ascites. There is diffuse reticular and sheetlike edema in the subcutaneous fat.    CT PELVIS: There are pelvic arterial vascular calcifications, with no distal ureteral or bladder calculi. The unenhanced rectum, uterus and adnexa are unremarkable, with excreted contrast material within the urinary bladder. There is trace low-density pelvic ascites.    There are scattered arterial vascular calcifications, with diffuse reticular and sheetlike edema in the subcutaneous fat, reflecting anasarca. There are no acute fractures or destructive osseous lesions, with diffuse mild appearance of the bones reflect renal osteodystrophy.    IMPRESSION:  1. Opacities at both lung bases suggesting volume overload and or interstitial pulmonary edema, with superimposed multifocal viral or atypical pneumonia, or nonspecific pneumonitis not excluded.  2. Small right pleural effusion and moderate-sized  pericardial effusion.  3. Trace ascites and anasarca.    Electronically signed by:  Daniel Edmondson MD  10/26/2022 3:15 PM CDT Workstation: [id number]                                     X-Ray Chest AP Portable (Final result)  Result time 10/26/22 13:30:37      Final result by Juaquin Will MD (10/26/22 13:30:37)                   Narrative:    Reason: Chest pain.    FINDINGS:    Portable chest comparison chest x-ray August 23, 2022 show unchanged enlarged cardiomediastinal silhouette. Right internal jugular dialysis catheter is unchanged.  Bilateral perihilar interstitial lung opacities are noted. Pulmonary vasculature is normal. No acute osseous abnormality.    IMPRESSION:    Bilateral perihilar interstitial lung opacities with enlarged cardiomediastinal silhouette could reflect CHF with pulmonary edema.    Electronically signed by:  Juaquin Will DO  10/26/2022 1:30 PM CDT Workstation: [id number]                                     Medications   iodixanoL (VISIPAQUE 320) injection 100 mL (has no administration in time range)   mupirocin 2 % ointment (has no administration in time range)   0.9%  NaCl infusion (for blood administration) (has no administration in time range)   potassium chloride SA CR tablet 40 mEq (has no administration in time range)   insulin aspart U-100 pen 5 Units (has no administration in time range)   ondansetron injection 4 mg (4 mg Intravenous Given 10/26/22 1322)   morphine injection 4 mg (4 mg Intravenous Given 10/26/22 1540)     Medical Decision Making:   History:   Old Medical Records: I decided to obtain old medical records.  Initial Assessment:   Emergent evaluation of a 32-year-old female presenting with abdominal pain differential diagnosis includes infection obstruction perforation electrolyte abnormality endocrine dysfunction gastric paresis          Attending Attestation:             Attending ED Notes:   Patient noted to have fluid overload, patient consulted to Nephrology who  will dialyze patient patient consulted to Internal Medicine for further evaluation and management                 Clinical Impression:   Final diagnoses:  [R07.9] Chest pain  [E87.70] Hypervolemia, unspecified hypervolemia type (Primary)        ED Disposition Condition    Observation                 Edgard Pittman MD  10/26/22 6633

## 2022-10-27 ENCOUNTER — CLINICAL SUPPORT (OUTPATIENT)
Dept: CARDIOLOGY | Facility: HOSPITAL | Age: 32
DRG: 291 | End: 2022-10-27
Attending: INTERNAL MEDICINE
Payer: MEDICAID

## 2022-10-27 VITALS — BODY MASS INDEX: 23.92 KG/M2 | HEIGHT: 62 IN | WEIGHT: 130 LBS

## 2022-10-27 LAB
ANION GAP SERPL CALC-SCNC: 12 MMOL/L (ref 8–16)
AORTIC ROOT ANNULUS: 3.33 CM
AV INDEX (PROSTH): 0.96
AV MEAN GRADIENT: 2 MMHG
AV VALVE AREA: 3.14 CM2
BSA FOR ECHO PROCEDURE: 1.61 M2
BUN SERPL-MCNC: 21 MG/DL (ref 6–20)
CALCIUM SERPL-MCNC: 9.3 MG/DL (ref 8.7–10.5)
CHLORIDE SERPL-SCNC: 106 MMOL/L (ref 95–110)
CO2 SERPL-SCNC: 22 MMOL/L (ref 23–29)
CREAT SERPL-MCNC: 2.5 MG/DL (ref 0.5–1.4)
CV ECHO LV RWT: 1.18 CM
DOP CALC AO VTI: 12.49 CM
DOP CALC LVOT AREA: 3.3 CM2
DOP CALC LVOT DIAMETER: 2.04 CM
DOP CALC LVOT PEAK VEL: 103.28 M/S
DOP CALC LVOT STROKE VOLUME: 39.17 CM3
DOP CALCLVOT PEAK VEL VTI: 11.99 CM
E WAVE DECELERATION TIME: 207.19 MSEC
E/A RATIO: 1.54
E/E' RATIO: 12.33 M/S
ECHO LV POSTERIOR WALL: 2.12 CM (ref 0.6–1.1)
EJECTION FRACTION: 55 %
ERYTHROCYTE [DISTWIDTH] IN BLOOD BY AUTOMATED COUNT: 15.5 % (ref 11.5–14.5)
EST. GFR  (NO RACE VARIABLE): 25.4 ML/MIN/1.73 M^2
FRACTIONAL SHORTENING: 24 % (ref 28–44)
GLUCOSE SERPL-MCNC: 243 MG/DL (ref 70–110)
GLUCOSE SERPL-MCNC: 305 MG/DL (ref 70–110)
GLUCOSE SERPL-MCNC: 322 MG/DL (ref 70–110)
HCT VFR BLD AUTO: 22.2 % (ref 37–48.5)
HGB BLD-MCNC: 7.7 G/DL (ref 12–16)
INTERVENTRICULAR SEPTUM: 1.69 CM (ref 0.6–1.1)
LEFT ATRIUM SIZE: 3.19 CM
LEFT ATRIUM VOLUME INDEX MOD: 28.2 ML/M2
LEFT ATRIUM VOLUME MOD: 44.77 CM3
LEFT INTERNAL DIMENSION IN SYSTOLE: 2.74 CM (ref 2.1–4)
LEFT VENTRICLE DIASTOLIC VOLUME INDEX: 29.23 ML/M2
LEFT VENTRICLE DIASTOLIC VOLUME: 46.48 ML
LEFT VENTRICLE MASS INDEX: 189 G/M2
LEFT VENTRICLE SYSTOLIC VOLUME INDEX: 13 ML/M2
LEFT VENTRICLE SYSTOLIC VOLUME: 20.65 ML
LEFT VENTRICULAR INTERNAL DIMENSION IN DIASTOLE: 3.6 CM (ref 3.5–6)
LEFT VENTRICULAR MASS: 300.3 G
LV LATERAL E/E' RATIO: 12.33 M/S
LV SEPTAL E/E' RATIO: 12.33 M/S
MAGNESIUM SERPL-MCNC: 2.1 MG/DL (ref 1.6–2.6)
MCH RBC QN AUTO: 28.9 PG (ref 27–31)
MCHC RBC AUTO-ENTMCNC: 34.7 G/DL (ref 32–36)
MCV RBC AUTO: 84 FL (ref 82–98)
MV PEAK A VEL: 0.48 M/S
MV PEAK E VEL: 0.74 M/S
PHOSPHATE SERPL-MCNC: 4.3 MG/DL (ref 2.7–4.5)
PISA TR MAX VEL: 2.85 M/S
PLATELET # BLD AUTO: 112 K/UL (ref 150–450)
PLATELET BLD QL SMEAR: ABNORMAL
PMV BLD AUTO: 10.1 FL (ref 9.2–12.9)
POTASSIUM SERPL-SCNC: 4.9 MMOL/L (ref 3.5–5.1)
RA PRESSURE: 3 MMHG
RBC # BLD AUTO: 2.66 M/UL (ref 4–5.4)
RIGHT VENTRICULAR END-DIASTOLIC DIMENSION: 3.25 CM
SODIUM SERPL-SCNC: 140 MMOL/L (ref 136–145)
TDI LATERAL: 0.06 M/S
TDI SEPTAL: 0.06 M/S
TDI: 0.06 M/S
TR MAX PG: 32 MMHG
TRICUSPID ANNULAR PLANE SYSTOLIC EXCURSION: 1.56 CM
TROPONIN I SERPL DL<=0.01 NG/ML-MCNC: 0.09 NG/ML
TROPONIN I SERPL DL<=0.01 NG/ML-MCNC: 0.16 NG/ML
TROPONIN I SERPL DL<=0.01 NG/ML-MCNC: 0.25 NG/ML
TV REST PULMONARY ARTERY PRESSURE: 35 MMHG
WBC # BLD AUTO: 15.58 K/UL (ref 3.9–12.7)

## 2022-10-27 PROCEDURE — 83735 ASSAY OF MAGNESIUM: CPT | Performed by: INTERNAL MEDICINE

## 2022-10-27 PROCEDURE — 96376 TX/PRO/DX INJ SAME DRUG ADON: CPT

## 2022-10-27 PROCEDURE — 84484 ASSAY OF TROPONIN QUANT: CPT | Performed by: INTERNAL MEDICINE

## 2022-10-27 PROCEDURE — 63600175 PHARM REV CODE 636 W HCPCS: Performed by: INTERNAL MEDICINE

## 2022-10-27 PROCEDURE — 93306 TTE W/DOPPLER COMPLETE: CPT

## 2022-10-27 PROCEDURE — 25000003 PHARM REV CODE 250: Performed by: INTERNAL MEDICINE

## 2022-10-27 PROCEDURE — 84100 ASSAY OF PHOSPHORUS: CPT | Performed by: INTERNAL MEDICINE

## 2022-10-27 PROCEDURE — 93306 TTE W/DOPPLER COMPLETE: CPT | Mod: 26,,, | Performed by: SPECIALIST

## 2022-10-27 PROCEDURE — 80048 BASIC METABOLIC PNL TOTAL CA: CPT | Performed by: INTERNAL MEDICINE

## 2022-10-27 PROCEDURE — 84484 ASSAY OF TROPONIN QUANT: CPT | Mod: 91 | Performed by: INTERNAL MEDICINE

## 2022-10-27 PROCEDURE — 96375 TX/PRO/DX INJ NEW DRUG ADDON: CPT

## 2022-10-27 PROCEDURE — 96365 THER/PROPH/DIAG IV INF INIT: CPT

## 2022-10-27 PROCEDURE — 36415 COLL VENOUS BLD VENIPUNCTURE: CPT | Performed by: INTERNAL MEDICINE

## 2022-10-27 PROCEDURE — 90935 HEMODIALYSIS ONE EVALUATION: CPT

## 2022-10-27 PROCEDURE — 25000003 PHARM REV CODE 250: Performed by: NURSE PRACTITIONER

## 2022-10-27 PROCEDURE — 85027 COMPLETE CBC AUTOMATED: CPT | Performed by: INTERNAL MEDICINE

## 2022-10-27 PROCEDURE — 93306 ECHO (CUPID ONLY): ICD-10-PCS | Mod: 26,,, | Performed by: SPECIALIST

## 2022-10-27 PROCEDURE — 12000002 HC ACUTE/MED SURGE SEMI-PRIVATE ROOM

## 2022-10-27 RX ORDER — HYDRALAZINE HYDROCHLORIDE 20 MG/ML
5 INJECTION INTRAMUSCULAR; INTRAVENOUS EVERY 8 HOURS PRN
Status: DISCONTINUED | OUTPATIENT
Start: 2022-10-27 | End: 2022-10-29 | Stop reason: HOSPADM

## 2022-10-27 RX ADMIN — CARVEDILOL 25 MG: 25 TABLET, FILM COATED ORAL at 08:10

## 2022-10-27 RX ADMIN — HYDROMORPHONE HYDROCHLORIDE 1 MG: 1 INJECTION, SOLUTION INTRAMUSCULAR; INTRAVENOUS; SUBCUTANEOUS at 02:10

## 2022-10-27 RX ADMIN — ONDANSETRON 4 MG: 2 INJECTION INTRAMUSCULAR; INTRAVENOUS at 07:10

## 2022-10-27 RX ADMIN — HYDRALAZINE HYDROCHLORIDE 100 MG: 25 TABLET ORAL at 05:10

## 2022-10-27 RX ADMIN — ONDANSETRON 4 MG: 2 INJECTION INTRAMUSCULAR; INTRAVENOUS at 08:10

## 2022-10-27 RX ADMIN — PROMETHAZINE HYDROCHLORIDE 25 MG: 25 INJECTION INTRAMUSCULAR; INTRAVENOUS at 02:10

## 2022-10-27 RX ADMIN — AMLODIPINE BESYLATE 10 MG: 5 TABLET ORAL at 05:10

## 2022-10-27 RX ADMIN — HYDRALAZINE HYDROCHLORIDE 100 MG: 25 TABLET ORAL at 08:10

## 2022-10-27 RX ADMIN — INSULIN DETEMIR 10 UNITS: 100 INJECTION, SOLUTION SUBCUTANEOUS at 10:10

## 2022-10-27 RX ADMIN — HYDRALAZINE HYDROCHLORIDE 5 MG: 20 INJECTION INTRAMUSCULAR; INTRAVENOUS at 08:10

## 2022-10-27 RX ADMIN — HYDROMORPHONE HYDROCHLORIDE 1 MG: 1 INJECTION, SOLUTION INTRAMUSCULAR; INTRAVENOUS; SUBCUTANEOUS at 07:10

## 2022-10-27 RX ADMIN — PANTOPRAZOLE SODIUM 40 MG: 40 TABLET, DELAYED RELEASE ORAL at 05:10

## 2022-10-27 RX ADMIN — PROMETHAZINE HYDROCHLORIDE 25 MG: 25 INJECTION INTRAMUSCULAR; INTRAVENOUS at 03:10

## 2022-10-27 RX ADMIN — MUPIROCIN 1 G: 20 OINTMENT TOPICAL at 08:10

## 2022-10-27 RX ADMIN — HYDROMORPHONE HYDROCHLORIDE 1 MG: 1 INJECTION, SOLUTION INTRAMUSCULAR; INTRAVENOUS; SUBCUTANEOUS at 08:10

## 2022-10-27 RX ADMIN — APIXABAN 5 MG: 5 TABLET, FILM COATED ORAL at 08:10

## 2022-10-27 NOTE — PROGRESS NOTES
HD tolerated fair  1 unit PRBC administered HD catheter dressing changed  UF goal decreased d/t hypotension  Net UF 1.8 L       10/26/22 1945   Handoff Report   Received From Stephanie Mena   Given To Dena   Vital Signs   Temp 98.9 °F (37.2 °C)   Temp src Axillary   Pulse 100   Resp 20   SpO2 97 %   /80   Assessments (Pre/Post)   Blood Liters Processed (BLP) 58   Transport Modality bed   Level of Consciousness (AVPU) alert   Dialyzer Clearance moderately streaked   Post-Hemodialysis Assessment   Rinseback Volume (mL) 250 mL   Blood Volume Processed (Liters) 58 L   Dialyzer Clearance Moderately streaked   Duration of Treatment 500 minutes   Additional Fluid Intake (mL) 2600 mL   Total UF (mL) 1800 mL   Net Fluid Removal 1800   Patient Response to Treatment tolerated fair   Post-Treatment Weight 57.2 kg (126 lb 1.7 oz)   Treatment Weight Change -1.8   Post-Hemodialysis Comments Tx complete, all blood reinfused per protocol

## 2022-10-27 NOTE — HOSPITAL COURSE
Patient was seen and examined in the dialysis place  She is drowsy but denied having chest pain or shortness of breath.  Abdomen is soft and no complaint of pain with pressure on the abdomen.  Getting dialysis and transfusion.  Potassium corrected.  Troponin elevation most likely from pulmonary edema    10/28  C/o abdominal pain . Distracted the patient and examined the abdomen and no tenderness at all and obvious pain killer seeking behavior   Later D/w cardiology and nephrology and ordered CT chest to deeper look at pericardial effusion   For HD today . BNP rising

## 2022-10-27 NOTE — PROGRESS NOTES
[facility] Medicine  Progress Note    Patient Name: Tabby Howard  MRN: [id number]  Patient Class: OP- Observation   Admission Date: 10/26/2022  Length of Stay: 0 days  Attending Physician: Armani Gurrola MD  Primary Care Provider: Primary Doctor No        Subjective:     Principal Problem:Volume overload        HPI:  Ms. Howard is a 32-years-old female who presented to the ED with chief complaint of pain.  Patient reports last night she developed left-sided chest discomfort, associated with shortness of breath, intermittently productive of phlegm, described as blood tinged.  States she is not on home oxygen.  Also admits chronic diffuse abdominal and back pain.  Denies any fever, chills, diarrhea/loose stools, no longer makes urine, no lower extremity edema. patient with recent [facility] admission for abdominal pain with C diff, she was discharged to complete a course of oral vancomycin which she confirms she has but unable to tell me when the last dose.  Known history of ESRD on HD TTS, denies missing any recent sessions.  Known history of insulin-dependent diabetes, states she takes Levemir 18 units in the evening but unable to tell me when last dose.  In the ED afebrile, heart rates in the low 100, blood pressure uncontrolled up to 179/108, placed on nasal cannula for comfort.  Labs with WBC 7.65, hemoglobin 7.6, platelets 74, potassium 2.9, creatinine 3, glucose 448, BNP 4138, troponin 0.058.  EKG sinus tachycardia at 108 beats per minute.  Chest x-ray consistent with pulmonary edema.  CT abdomen and pelvis reviewed.  Previous echo July 2022 with EF of 50% with moderate circumferential pericardial effusion.  She received morphine 4 mg and Zofran 4 mg in the E D.  Case discussed with ED provider who stated he discussed with Nephrology, planning dialysis today for volume removal as well as 1 unit PRBC transfusion.  Plan of care discussed with patient, no visitors at bedside.  Discussed with  RN.      Overview/Hospital Course:  Patient was seen and examined in the dialysis place  She is drowsy but denied having chest pain or shortness of breath.  Abdomen is soft and no complaint of pain with pressure on the abdomen.  Getting dialysis and transfusion.  Potassium corrected.  Troponin elevation most likely from pulmonary edema      Interval History:     Review of Systems  As per subjective   Objective:     Vital Signs (Most Recent):  Temp: 98 °F (36.7 °C) (10/27/22 1220)  Pulse: 99 (10/27/22 1220)  Resp: 18 (10/27/22 1220)  BP: (!) 157/88 (10/27/22 1220)  SpO2: 99 % (10/27/22 1220)   Vital Signs (24h Range):  Temp:  [98 °F (36.7 °C)-98.9 °F (37.2 °C)] 98 °F (36.7 °C)  Pulse:  [] 99  Resp:  [14-22] 18  SpO2:  [74 %-99 %] 99 %  BP: ()/() 157/88     Weight: 59 kg (130 lb)  Body mass index is 23.78 kg/m².    Intake/Output Summary (Last 24 hours) at 10/27/2022 1359  Last data filed at 10/27/2022 1220  Gross per 24 hour   Intake 3070 ml   Output 5550 ml   Net -2480 ml      Physical Exam  Vitals and nursing note reviewed.   Constitutional:       Comments: Drowsy++   HENT:      Head: Normocephalic and atraumatic.   Eyes:      Conjunctiva/sclera: Conjunctivae normal.   Neck:      Vascular: No JVD.   Cardiovascular:      Heart sounds: Normal heart sounds.   Pulmonary:      Effort: Pulmonary effort is normal.      Breath sounds: Rales present.   Abdominal:      General: Bowel sounds are normal.      Palpations: Abdomen is soft.   Skin:     General: Skin is warm.   Neurological:      Mental Status: She is oriented to person, place, and time.   Psychiatric:         Mood and Affect: Mood normal.       Significant Labs: All pertinent labs within the past 24 hours have been reviewed.  CBC:   Recent Labs   Lab 10/26/22  1235 10/27/22  0640   WBC 7.65 15.58*   HGB 7.6* 7.7*   HCT 21.7* 22.2*   PLT 74* 112*     CMP:   Recent Labs   Lab 10/26/22  1235 10/27/22  0640   * 140   K 2.9* 4.9   CL 93* 106    CO2 31* 22*   * 322*   BUN 23* 21*   CREATININE 3.0* 2.5*   CALCIUM 8.0* 9.3   PROT 6.2  --    ALBUMIN 2.8*  --    BILITOT 2.3*  --    ALKPHOS 91  --    AST 22  --    ALT 13  --    ANIONGAP 8 12     Cardiac Markers:   Recent Labs   Lab 10/26/22  1235   BNP 4,138*       Significant Imaging: I have reviewed all pertinent imaging results/findings within the past 24 hours.      Assessment/Plan:      Active Hospital Problems    Diagnosis    *Volume overload with pulmonary edema    Chest pain    Hyponatremia    Hypokalemia    Anemia, acute on chronic    Thrombocytopenia    Chronic abdominal pain    Hyperglycemia    HTN (hypertension)    Deep vein thrombosis (DVT) of non-extremity vein    ESRD (end stage renal disease)    Gastroparesis    Sickle cell trait    Pericardial effusion    Type 2 insulin-dependent diabetes mellitus, previous HbA1c 6.2%, with hyperglycemia    SVT (supraventricular tachycardia)       Plan:   dialysis session  And blood transfusion   Follow limited ECHO for pervious small pericardial effusion   SSI . Unsure reason of hyperglycemia . Change to high dose   A1c will not accurate in HD patients  Resumed  home antihypertensive and monitoring blood pressure   Abdominal pain most likely from uremia CT-scan of the abdomen with no acute findings . Clinical exam abdomen is benign .    VTE Risk Mitigation (From admission, onward)         Ordered     apixaban tablet 5 mg  2 times daily         10/26/22 1747     IP VTE HIGH RISK PATIENT  Once         10/26/22 1747     Place sequential compression device  Until discontinued         10/26/22 1747                Discharge Planning   TATIANA: 10/31/2022     Code Status: Full Code   Is the patient medically ready for discharge?:     Reason for patient still in hospital (select all that apply): Treatment                     Armani Gurrola MD  Department of Hospital Medicine   [facility]

## 2022-10-27 NOTE — PROGRESS NOTES
Progress Note  Nephrology    Consult Requested By: Armani Gurrola MD    Reason for Consult: ESRD, dependent on dialysis    SUBJECTIVE:     History of Present Illness:  31 y/o female patient known to our practice, has out patient dialysis 3x wk on TTS schedule.  She did go to her dialysis tx yesterday and was seen by me.  She presents to ER today w/complaint of chest pain and abd pain.  Has electrolyte derrangements, BNP >4000.  Nephrology is consulted for dialysis orders.    Interval History:  10/27- seen on HD    Assessment/plan:    ESRD on HD TTS  HTN/CHF  Hypokalemia  SHPT  Anemia of CKD    - continue HD TTS  - resume all home BP meds- UF 2-4L- fluid restrict 1.5L/day  - adjusted dialysate  - transfused 10/26- Hb didn't really bump- ordered SHELLEY 150u/kg with HD    Review of Systems:  General ROS: negative for - fever or night sweats  Psychological ROS: negative for - behavioral disorder or depression  ENT ROS: negative for - headaches or visual changes  Hematological and Lymphatic ROS: negative for - bleeding problems or bruising  Endocrine ROS: negative for - temperature intolerance or unexpected weight changes  Respiratory ROS: no cough, shortness of breath, or wheezing  Cardiovascular ROS: no chest pain, SOB  Gastrointestinal ROS:  abd pain  Genito-Urinary ROS: no dysuria or trouble voiding  Musculoskeletal ROS: negative for - joint pain or joint swelling  Neurological ROS: no TIA or stroke symptoms  Dermatological ROS: negative for rash and skin lesion changes    OBJECTIVE:     Vital Signs Range (Last 24H):  Temp:  [98 °F (36.7 °C)-98.9 °F (37.2 °C)]   Pulse:  []   Resp:  [14-22]   BP: ()/()   SpO2:  [74 %-100 %]     Physical Exam:  General- NAD noted  HEENT- WNL  Neck- supple  CV- Regular rate and rhythm  Resp- Lungs CTA Bilaterally, No increased WOB  GI- Non tender/non-distended, BS normoactive x4 quads  Extrem- No cyanosis, clubbing, edema.  Derm- skin w/d  Neuro-  No flap.     Body mass  index is 23.78 kg/m².    Laboratory:  CBC:   Recent Labs   Lab 10/27/22  0640   WBC 15.58*   RBC 2.66*   HGB 7.7*   HCT 22.2*   *   MCV 84   MCH 28.9   MCHC 34.7       CMP:   Recent Labs   Lab 10/26/22  1235 10/27/22  0640   * 322*   CALCIUM 8.0* 9.3   ALBUMIN 2.8*  --    PROT 6.2  --    * 140   K 2.9* 4.9   CO2 31* 22*   CL 93* 106   BUN 23* 21*   CREATININE 3.0* 2.5*   ALKPHOS 91  --    ALT 13  --    AST 22  --    BILITOT 2.3*  --          Diagnostic Results:  Labs: Reviewed      ASSESSMENT/PLAN:     Active Hospital Problems    Diagnosis  POA    *Volume overload with pulmonary edema [E87.70]  Yes    Chest pain [R07.9]  Yes    Hyponatremia [E87.1]  Yes    Hypokalemia [E87.6]  Yes    Anemia, acute on chronic [D64.9]  Yes     Chronic    Thrombocytopenia [D69.6]  Yes     Chronic    Chronic abdominal pain [R10.9, G89.29]  Yes     Chronic    Hyperglycemia [R73.9]  Yes    HTN (hypertension) [I10]  Yes    Deep vein thrombosis (DVT) of non-extremity vein [I82.90]  Yes    ESRD (end stage renal disease) [N18.6]  Yes    Gastroparesis [K31.84]  Yes    Sickle cell trait [D57.3]  Yes    Pericardial effusion [I31.39]  Yes    Type 2 insulin-dependent diabetes mellitus, previous HbA1c 6.2%, with hyperglycemia [E11.9]  Yes     Chronic    SVT (supraventricular tachycardia) [I47.1]  Yes      Resolved Hospital Problems   No resolved problems to display.     Patient care time was spent personally by me on the following activities: > 35 minutes  Obtaining a history.  Examination of patient.  Providing medical care at the patients bedside.  Developing a treatment plan with patient or surrogate and bedside caregivers.  Ordering and reviewing laboratory studies, radiographic studies, pulse oximetry.  Ordering and performing treatments and interventions.  Evaluation of patient's response to treatment.  Discussions with consultants while on the unit and immediately available to the patient.  Re-evaluation of the patient's  condition.  Documentation in the medical record.        Thank you for allowing us to participate in the care of your patient. We will follow the patient and provide recommendations as needed.        Prateek Clark MD

## 2022-10-27 NOTE — PLAN OF CARE
[facility]  Initial Discharge Assessment       Primary Care Provider: Primary Doctor No    Admission Diagnosis: Hypervolemia, unspecified hypervolemia type [E87.70]    Admission Date: 10/26/2022  Expected Discharge Date: 10/31/2022    Discharge Barriers Identified: None    Assessment completed at bedside with Pt. Pt lives with her dad and stepmom and plans to return home at discharge. Pt reports using a rolling walker at home and needs help with bathing and dressing herself. Pt is not requesting any additional DME for discharge. POA/AD not discussed. Pt gets dialysis with [facility], unsure which location because of a recent switch of location and days. New days will be MWF and thinks it will be in [place].     Payor: MEDICAID / Plan: [place] [place] / Product Type: Managed Medicaid /     Extended Emergency Contact Information  Primary Emergency Contact: Garcia Howard  Mobile Phone: [phone]  Relation: Father  Preferred language: English   needed? No  Secondary Emergency Contact: Tanya Howard  Mobile Phone: [phone]  Relation: Step parent  Preferred language: English   needed? No    Discharge Plan A: Home with family  Discharge Plan B: Home with family      [facility]  [address]  [facility] [address]  Phone: [phone] Fax: [phone]      Initial Assessment (most recent)       Adult Discharge Assessment - 10/27/22 1456          Discharge Assessment    Assessment Type Discharge Planning Assessment     Confirmed/corrected address, phone number and insurance Yes     Confirmed Demographics Correct on Facesheet     Source of Information patient     When was your last doctors appointment? 10/03/22     Communicated TATIANA with patient/caregiver Date not available/Unable to determine     Reason For Admission volume overload w/ pulmonary edema; pain     Lives With parent(s)     Facility Arrived From: home     Do you expect to return to your  current living situation? Yes     Do you have help at home or someone to help you manage your care at home? Yes     Who are your caregiver(s) and their phone number(s)? Garcia Howard, father, [phone]     Prior to hospitilization cognitive status: No Deficits     Current cognitive status: No Deficits     Walking or Climbing Stairs Difficulty ambulation difficulty, requires equipment     Mobility Management rolling walker     Dressing/Bathing Difficulty bathing difficulty, assistance 1 person     Dressing/Bathing Management family     Equipment Currently Used at Home walker, rolling     Readmission within 30 days? Yes     Patient currently being followed by outpatient case management? No     Do you currently have service(s) that help you manage your care at home? No     Do you take prescription medications? Yes     Do you have prescription coverage? Yes     Coverage Medicaid     Do you have any problems affording any of your prescribed medications? No     Is the patient taking medications as prescribed? yes     Who is going to help you get home at discharge? Garcia Howard, father, [phone]     How do you get to doctors appointments? family or friend will provide;health plan transportation     Are you on dialysis? Yes     Dialysis Name and Scheduled days MWF [facility] in [place]     Do you take coumadin? No     Discharge Plan A Home with family     Discharge Plan B Home with family     DME Needed Upon Discharge  none     Discharge Plan discussed with: Patient     Discharge Barriers Identified None        Physical Activity    On average, how many days per week do you engage in moderate to strenuous exercise (like a brisk walk)? Patient refused     On average, how many minutes do you engage in exercise at this level? Patient refused        Financial Resource Strain    How hard is it for you to pay for the very basics like food, housing, medical care, and heating? Patient refused        Housing Stability    In the last 12  months, was there a time when you were not able to pay the mortgage or rent on time? Patient refused     In the last 12 months, was there a time when you did not have a steady place to sleep or slept in a shelter (including now)? Patient refused        Transportation Needs    In the past 12 months, has lack of transportation kept you from medical appointments or from getting medications? Patient refused     In the past 12 months, has lack of transportation kept you from meetings, work, or from getting things needed for daily living? Patient refused        Food Insecurity    Within the past 12 months, you worried that your food would run out before you got the money to buy more. Patient refused     Within the past 12 months, the food you bought just didn't last and you didn't have money to get more. Patient refused        Stress    Do you feel stress - tense, restless, nervous, or anxious, or unable to sleep at night because your mind is troubled all the time - these days? Patient refused        Social Connections    In a typical week, how many times do you talk on the phone with family, friends, or neighbors? Patient refused     How often do you get together with friends or relatives? Patient refused     How often do you attend Baptist or Judaism services? Patient refused     Do you belong to any clubs or organizations such as Baptist groups, unions, fraternal or athletic groups, or school groups? Patient refused     How often do you attend meetings of the clubs or organizations you belong to? Patient refused     Are you , , , , never , or living with a partner? Patient refused        Alcohol Use    Q1: How often do you have a drink containing alcohol? Patient refused     Q2: How many drinks containing alcohol do you have on a typical day when you are drinking? Patient refused     Q3: How often do you have six or more drinks on one occasion? Patient refused                "      Readmission Assessment (most recent)       Readmission Assessment - 10/27/22 1501          Readmission    Why were you hospitalized in the last 30 days? same reason as today (P)      Why were you readmitted? Related to previous admission (P)      When you left the hospital how did you feel? still in pain (P)      When you left the hospital where did you go? Home with Family (P)      Did patient/caregiver refused recommended DC plan? No (P)      Tell me about what happened between when you left the hospital and the day you returned. tried to go about life normally but pain stayed too severe (P)      When did you start not feeling well? "never stopped feeling bad" (P)      Did you try to manage your symptoms your self? Yes (P)      What did you do? took tylenol (P)      Did you call anyone? Yes (P)      Who did you call? PCP (P)      Did you try to see or did see a doctor or nurse before you came? Yes (P)      Were you seen? Yes (P)      Did you have  a follow-up appointment on discharge? Yes (P)      Did you go? Yes (P)      Was this a planned readmission? No (P)                             "

## 2022-10-27 NOTE — NURSING
0700: report received, in bed crying in pain  0750: medicated for pain  0825: medicated for elevated blood pressure, patient with n/v and cant swallow po  0910: taken to dialysis  1245:  back from dialysis, no distress  1720: medicated twice today for pain and twice for nausea, able to tolerate her po blood pressure meds.

## 2022-10-27 NOTE — SUBJECTIVE & OBJECTIVE
Interval History:     Review of Systems  As per subjective   Objective:     Vital Signs (Most Recent):  Temp: 98 °F (36.7 °C) (10/27/22 1220)  Pulse: 99 (10/27/22 1220)  Resp: 18 (10/27/22 1220)  BP: (!) 157/88 (10/27/22 1220)  SpO2: 99 % (10/27/22 1220)   Vital Signs (24h Range):  Temp:  [98 °F (36.7 °C)-98.9 °F (37.2 °C)] 98 °F (36.7 °C)  Pulse:  [] 99  Resp:  [14-22] 18  SpO2:  [74 %-99 %] 99 %  BP: ()/() 157/88     Weight: 59 kg (130 lb)  Body mass index is 23.78 kg/m².    Intake/Output Summary (Last 24 hours) at 10/27/2022 1359  Last data filed at 10/27/2022 1220  Gross per 24 hour   Intake 3070 ml   Output 5550 ml   Net -2480 ml      Physical Exam  Vitals and nursing note reviewed.   Constitutional:       Comments: Drowsy++   HENT:      Head: Normocephalic and atraumatic.   Eyes:      Conjunctiva/sclera: Conjunctivae normal.   Neck:      Vascular: No JVD.   Cardiovascular:      Heart sounds: Normal heart sounds.   Pulmonary:      Effort: Pulmonary effort is normal.      Breath sounds: Rales present.   Abdominal:      General: Bowel sounds are normal.      Palpations: Abdomen is soft.   Skin:     General: Skin is warm.   Neurological:      Mental Status: She is oriented to person, place, and time.   Psychiatric:         Mood and Affect: Mood normal.       Significant Labs: All pertinent labs within the past 24 hours have been reviewed.  CBC:   Recent Labs   Lab 10/26/22  1235 10/27/22  0640   WBC 7.65 15.58*   HGB 7.6* 7.7*   HCT 21.7* 22.2*   PLT 74* 112*     CMP:   Recent Labs   Lab 10/26/22  1235 10/27/22  0640   * 140   K 2.9* 4.9   CL 93* 106   CO2 31* 22*   * 322*   BUN 23* 21*   CREATININE 3.0* 2.5*   CALCIUM 8.0* 9.3   PROT 6.2  --    ALBUMIN 2.8*  --    BILITOT 2.3*  --    ALKPHOS 91  --    AST 22  --    ALT 13  --    ANIONGAP 8 12     Cardiac Markers:   Recent Labs   Lab 10/26/22  1235   BNP 4,138*       Significant Imaging: I have reviewed all pertinent imaging  results/findings within the past 24 hours.

## 2022-10-27 NOTE — PROGRESS NOTES
10/27/22 1220   Vital Signs   Temp 98 °F (36.7 °C)   Temp src Oral   Pulse 99   Resp 18   SpO2 99 %   Pulse Oximetry Type Intermittent   BP (!) 157/88   BP Location Left arm   BP Method Automatic   Patient Position Lying   Orthostatic VS No   Post-Hemodialysis Assessment   Rinseback Volume (mL) 250 mL   Blood Volume Processed (Liters) 65 L   Dialyzer Clearance Lightly streaked   Duration of Treatment 180 minutes   Additional Fluid Intake (mL) 0 mL   Total UF (mL) 3500 mL   Net Fluid Removal 3000   Patient Response to Treatment tolerated well   Post-Treatment Weight 56 kg (123 lb 7.3 oz)   Treatment Weight Change -3   Post-Hemodialysis Comments vss, no complaints

## 2022-10-28 ENCOUNTER — CLINICAL SUPPORT (OUTPATIENT)
Dept: CARDIOLOGY | Facility: HOSPITAL | Age: 32
DRG: 291 | End: 2022-10-28
Attending: SPECIALIST
Payer: MEDICAID

## 2022-10-28 VITALS — WEIGHT: 130 LBS | BODY MASS INDEX: 23.92 KG/M2 | HEIGHT: 62 IN

## 2022-10-28 LAB
ANION GAP SERPL CALC-SCNC: 7 MMOL/L (ref 8–16)
BLD PROD TYP BPU: NORMAL
BLOOD UNIT EXPIRATION DATE: NORMAL
BLOOD UNIT TYPE CODE: 6200
BLOOD UNIT TYPE: NORMAL
BNP SERPL-MCNC: >4500 PG/ML (ref 0–99)
BSA FOR ECHO PROCEDURE: 1.61 M2
BUN SERPL-MCNC: 27 MG/DL (ref 6–20)
CALCIUM SERPL-MCNC: 8.8 MG/DL (ref 8.7–10.5)
CHLORIDE SERPL-SCNC: 102 MMOL/L (ref 95–110)
CO2 SERPL-SCNC: 29 MMOL/L (ref 23–29)
CODING SYSTEM: NORMAL
CREAT SERPL-MCNC: 3 MG/DL (ref 0.5–1.4)
DISPENSE STATUS: NORMAL
EJECTION FRACTION: 34 %
ERYTHROCYTE [DISTWIDTH] IN BLOOD BY AUTOMATED COUNT: 15.9 % (ref 11.5–14.5)
EST. GFR  (NO RACE VARIABLE): 20.4 ML/MIN/1.73 M^2
GLUCOSE SERPL-MCNC: 139 MG/DL (ref 70–110)
GLUCOSE SERPL-MCNC: 162 MG/DL (ref 70–110)
GLUCOSE SERPL-MCNC: 210 MG/DL (ref 70–110)
GLUCOSE SERPL-MCNC: 445 MG/DL (ref 70–110)
HBV SURFACE AB SER QL: REACTIVE
HBV SURFACE AG SERPL QL IA: NEGATIVE
HCT VFR BLD AUTO: 20.1 % (ref 37–48.5)
HGB BLD-MCNC: 7 G/DL (ref 12–16)
MCH RBC QN AUTO: 29.2 PG (ref 27–31)
MCHC RBC AUTO-ENTMCNC: 34.8 G/DL (ref 32–36)
MCV RBC AUTO: 84 FL (ref 82–98)
NUM UNITS TRANS PACKED RBC: NORMAL
PLATELET # BLD AUTO: 100 K/UL (ref 150–450)
PMV BLD AUTO: 11.4 FL (ref 9.2–12.9)
POTASSIUM SERPL-SCNC: 3.4 MMOL/L (ref 3.5–5.1)
RBC # BLD AUTO: 2.4 M/UL (ref 4–5.4)
SODIUM SERPL-SCNC: 138 MMOL/L (ref 136–145)
WBC # BLD AUTO: 13.35 K/UL (ref 3.9–12.7)

## 2022-10-28 PROCEDURE — 25000003 PHARM REV CODE 250: Performed by: INTERNAL MEDICINE

## 2022-10-28 PROCEDURE — 90935 HEMODIALYSIS ONE EVALUATION: CPT

## 2022-10-28 PROCEDURE — 12000002 HC ACUTE/MED SURGE SEMI-PRIVATE ROOM

## 2022-10-28 PROCEDURE — 36415 COLL VENOUS BLD VENIPUNCTURE: CPT | Performed by: INTERNAL MEDICINE

## 2022-10-28 PROCEDURE — 82232 ASSAY OF BETA-2 PROTEIN: CPT | Performed by: INTERNAL MEDICINE

## 2022-10-28 PROCEDURE — 80048 BASIC METABOLIC PNL TOTAL CA: CPT | Performed by: INTERNAL MEDICINE

## 2022-10-28 PROCEDURE — 63600175 PHARM REV CODE 636 W HCPCS: Performed by: INTERNAL MEDICINE

## 2022-10-28 PROCEDURE — 84165 PROTEIN E-PHORESIS SERUM: CPT | Performed by: INTERNAL MEDICINE

## 2022-10-28 PROCEDURE — 93308 TTE F-UP OR LMTD: CPT | Mod: 26,,, | Performed by: SPECIALIST

## 2022-10-28 PROCEDURE — 83880 ASSAY OF NATRIURETIC PEPTIDE: CPT | Performed by: INTERNAL MEDICINE

## 2022-10-28 PROCEDURE — 25000003 PHARM REV CODE 250: Performed by: SPECIALIST

## 2022-10-28 PROCEDURE — P9016 RBC LEUKOCYTES REDUCED: HCPCS | Performed by: INTERNAL MEDICINE

## 2022-10-28 PROCEDURE — 99223 1ST HOSP IP/OBS HIGH 75: CPT | Mod: ,,, | Performed by: SPECIALIST

## 2022-10-28 PROCEDURE — 99223 PR INITIAL HOSPITAL CARE,LEVL III: ICD-10-PCS | Mod: ,,, | Performed by: SPECIALIST

## 2022-10-28 PROCEDURE — 83521 IG LIGHT CHAINS FREE EACH: CPT | Performed by: INTERNAL MEDICINE

## 2022-10-28 PROCEDURE — 85027 COMPLETE CBC AUTOMATED: CPT | Performed by: INTERNAL MEDICINE

## 2022-10-28 PROCEDURE — 93356 ECHO (CUPID ONLY): ICD-10-PCS | Mod: ,,, | Performed by: SPECIALIST

## 2022-10-28 PROCEDURE — 93308 ECHO (CUPID ONLY): ICD-10-PCS | Mod: 26,,, | Performed by: SPECIALIST

## 2022-10-28 PROCEDURE — 93308 TTE F-UP OR LMTD: CPT

## 2022-10-28 PROCEDURE — 93356 MYOCRD STRAIN IMG SPCKL TRCK: CPT | Mod: ,,, | Performed by: SPECIALIST

## 2022-10-28 RX ORDER — METOPROLOL SUCCINATE 50 MG/1
50 TABLET, EXTENDED RELEASE ORAL 2 TIMES DAILY
Status: DISCONTINUED | OUTPATIENT
Start: 2022-10-28 | End: 2022-10-29 | Stop reason: HOSPADM

## 2022-10-28 RX ORDER — HYDROCODONE BITARTRATE AND ACETAMINOPHEN 5; 325 MG/1; MG/1
1 TABLET ORAL EVERY 8 HOURS PRN
Status: DISCONTINUED | OUTPATIENT
Start: 2022-10-28 | End: 2022-10-29 | Stop reason: HOSPADM

## 2022-10-28 RX ADMIN — INSULIN DETEMIR 10 UNITS: 100 INJECTION, SOLUTION SUBCUTANEOUS at 11:10

## 2022-10-28 RX ADMIN — METOPROLOL SUCCINATE 50 MG: 50 TABLET, FILM COATED, EXTENDED RELEASE ORAL at 09:10

## 2022-10-28 RX ADMIN — ONDANSETRON 4 MG: 2 INJECTION INTRAMUSCULAR; INTRAVENOUS at 09:10

## 2022-10-28 RX ADMIN — AMLODIPINE BESYLATE 10 MG: 5 TABLET ORAL at 08:10

## 2022-10-28 RX ADMIN — PROMETHAZINE HYDROCHLORIDE 25 MG: 25 INJECTION INTRAMUSCULAR; INTRAVENOUS at 03:10

## 2022-10-28 RX ADMIN — MELATONIN 6 MG: at 11:10

## 2022-10-28 RX ADMIN — HYDROMORPHONE HYDROCHLORIDE 0.5 MG: 1 INJECTION, SOLUTION INTRAMUSCULAR; INTRAVENOUS; SUBCUTANEOUS at 10:10

## 2022-10-28 RX ADMIN — INSULIN ASPART 10 UNITS: 100 INJECTION, SOLUTION INTRAVENOUS; SUBCUTANEOUS at 07:10

## 2022-10-28 RX ADMIN — CARVEDILOL 25 MG: 25 TABLET, FILM COATED ORAL at 08:10

## 2022-10-28 RX ADMIN — HYDRALAZINE HYDROCHLORIDE 100 MG: 25 TABLET ORAL at 05:10

## 2022-10-28 RX ADMIN — HYDRALAZINE HYDROCHLORIDE 100 MG: 25 TABLET ORAL at 10:10

## 2022-10-28 RX ADMIN — HYDROMORPHONE HYDROCHLORIDE 1 MG: 1 INJECTION, SOLUTION INTRAMUSCULAR; INTRAVENOUS; SUBCUTANEOUS at 05:10

## 2022-10-28 RX ADMIN — HYDROMORPHONE HYDROCHLORIDE 0.5 MG: 1 INJECTION, SOLUTION INTRAMUSCULAR; INTRAVENOUS; SUBCUTANEOUS at 03:10

## 2022-10-28 RX ADMIN — PANTOPRAZOLE SODIUM 40 MG: 40 TABLET, DELAYED RELEASE ORAL at 05:10

## 2022-10-28 RX ADMIN — HYDROCODONE BITARTRATE AND ACETAMINOPHEN 1 TABLET: 5; 325 TABLET ORAL at 11:10

## 2022-10-28 RX ADMIN — INSULIN ASPART 2 UNITS: 100 INJECTION, SOLUTION INTRAVENOUS; SUBCUTANEOUS at 05:10

## 2022-10-28 RX ADMIN — APIXABAN 5 MG: 5 TABLET, FILM COATED ORAL at 09:10

## 2022-10-28 RX ADMIN — APIXABAN 5 MG: 5 TABLET, FILM COATED ORAL at 08:10

## 2022-10-28 RX ADMIN — ONDANSETRON 4 MG: 2 INJECTION INTRAMUSCULAR; INTRAVENOUS at 11:10

## 2022-10-28 NOTE — PROGRESS NOTES
[facility]  Progress Note    Patient Name: Tabby Howard  MRN: [id number]  Patient Class: IP- Inpatient   Admission Date: 10/26/2022  Length of Stay: 1 days  Attending Physician: Armani Gurrola MD  Primary Care Provider: Primary Doctor No        Subjective:     Principal Problem:Volume overload        HPI:  Ms. Howard is a 32-years-old female who presented to the ED with chief complaint of pain.  Patient reports last night she developed left-sided chest discomfort, associated with shortness of breath, intermittently productive of phlegm, described as blood tinged.  States she is not on home oxygen.  Also admits chronic diffuse abdominal and back pain.  Denies any fever, chills, diarrhea/loose stools, no longer makes urine, no lower extremity edema. patient with recent [facility] admission for abdominal pain with C diff, she was discharged to complete a course of oral vancomycin which she confirms she has but unable to tell me when the last dose.  Known history of ESRD on HD TTS, denies missing any recent sessions.  Known history of insulin-dependent diabetes, states she takes Levemir 18 units in the evening but unable to tell me when last dose.  In the ED afebrile, heart rates in the low 100, blood pressure uncontrolled up to 179/108, placed on nasal cannula for comfort.  Labs with WBC 7.65, hemoglobin 7.6, platelets 74, potassium 2.9, creatinine 3, glucose 448, BNP 4138, troponin 0.058.  EKG sinus tachycardia at 108 beats per minute.  Chest x-ray consistent with pulmonary edema.  CT abdomen and pelvis reviewed.  Previous echo July 2022 with EF of 50% with moderate circumferential pericardial effusion.  She received morphine 4 mg and Zofran 4 mg in the E D.  Case discussed with ED provider who stated he discussed with Nephrology, planning dialysis today for volume removal as well as 1 unit PRBC transfusion.  Plan of care discussed with patient, no visitors at bedside.  Discussed with  RN.      Overview/Hospital Course:  Patient was seen and examined in the dialysis place  She is drowsy but denied having chest pain or shortness of breath.  Abdomen is soft and no complaint of pain with pressure on the abdomen.  Getting dialysis and transfusion.  Potassium corrected.  Troponin elevation most likely from pulmonary edema    10/28  C/o abdominal pain . Distracted the patient and examined the abdomen and no tenderness at all and obvious pain killer seeking behavior   Later D/w cardiology and nephrology and ordered CT chest to deeper look at pericardial effusion   For HD today . BNP rising       Interval History:     Review of Systems  Objective:     Vital Signs (Most Recent):  Temp: 97.4 °F (36.3 °C) (10/28/22 1200)  Pulse: 81 (10/28/22 1300)  Resp: 18 (10/28/22 1110)  BP: 103/60 (10/28/22 1300)  SpO2: (!) 94 % (10/28/22 0725)   Vital Signs (24h Range):  Temp:  [97.4 °F (36.3 °C)-99.1 °F (37.3 °C)] 97.4 °F (36.3 °C)  Pulse:  [] 81  Resp:  [16-20] 18  SpO2:  [88 %-98 %] 94 %  BP: ()/() 103/60     Weight: 59 kg (130 lb)  Body mass index is 23.78 kg/m².    Intake/Output Summary (Last 24 hours) at 10/28/2022 1350  Last data filed at 10/28/2022 1200  Gross per 24 hour   Intake 962 ml   Output --   Net 962 ml      Physical Exam  Vitals and nursing note reviewed.   HENT:      Head: Normocephalic and atraumatic.   Eyes:      Conjunctiva/sclera: Conjunctivae normal.   Neck:      Vascular: No JVD.   Cardiovascular:      Heart sounds: Normal heart sounds.   Pulmonary:      Effort: Pulmonary effort is normal.      Breath sounds: Normal breath sounds.   Abdominal:      Palpations: Abdomen is soft.   Musculoskeletal:         General: Normal range of motion.   Skin:     General: Skin is warm.   Neurological:      Mental Status: She is alert.   Psychiatric:         Mood and Affect: Mood normal.       Significant Labs: All pertinent labs within the past 24 hours have been reviewed.  BMP:   Recent Labs    Lab 10/27/22  0640 10/28/22  0811   * 210*    138   K 4.9 3.4*    102   CO2 22* 29   BUN 21* 27*   CREATININE 2.5* 3.0*   CALCIUM 9.3 8.8   MG 2.1  --      CBC:   Recent Labs   Lab 10/27/22  0640 10/28/22  0811   WBC 15.58* 13.35*   HGB 7.7* 7.0*   HCT 22.2* 20.1*   * 100*     CMP:   Recent Labs   Lab 10/27/22  0640 10/28/22  0811    138   K 4.9 3.4*    102   CO2 22* 29   * 210*   BUN 21* 27*   CREATININE 2.5* 3.0*   CALCIUM 9.3 8.8   ANIONGAP 12 7*       Significant Imaging: I have reviewed all pertinent imaging results/findings within the past 24 hours.      Assessment/Plan:      Active Hospital Problems    Diagnosis    *Volume overload with pulmonary edema    Chest pain    Hyponatremia    Hypokalemia    Anemia, acute on chronic    Thrombocytopenia    Chronic abdominal pain    Hyperglycemia    HTN (hypertension)    Deep vein thrombosis (DVT) of non-extremity vein    ESRD (end stage renal disease)    Gastroparesis    Sickle cell trait    Pericardial effusion    Type 2 insulin-dependent diabetes mellitus, previous HbA1c 6.2%, with hyperglycemia    SVT (supraventricular tachycardia)     Plan:   dialysis session today again and possible transfusion if needed   Limited ECHO with new findings of abnormal septal wall motion possible infiltrate disease and dr hart added speckled tracking  Dr Hart recommend CT chest for better delineate  The pericardial effusion and ordered   SSI . A1c will not accurate in HD patients  Resumed  home antihypertensive and monitoring blood pressure   Abdominal pain most likely from uremia / pain killer seeking CT-scan of the abdomen with no acute findings . Clinical exam abdomen is benign .    VTE Risk Mitigation (From admission, onward)         Ordered     apixaban tablet 5 mg  2 times daily         10/26/22 1747     IP VTE HIGH RISK PATIENT  Once         10/26/22 1747     Place sequential compression device  Until discontinued          10/26/22 1747                Discharge Planning   TATIANA: 10/30/2022     Code Status: Full Code   Is the patient medically ready for discharge?:     Reason for patient still in hospital (select all that apply): Treatment  Discharge Plan A: Home with family                  Armani Gurrola MD  Department of Hospital Medicine   [facility]

## 2022-10-28 NOTE — PLAN OF CARE
Problem: Device-Related Complication Risk (Hemodialysis)  Goal: Safe, Effective Therapy Delivery  Outcome: Ongoing, Progressing     Problem: Hemodynamic Instability (Hemodialysis)  Goal: Effective Tissue Perfusion  Outcome: Ongoing, Progressing     Problem: Infection (Hemodialysis)  Goal: Absence of Infection Signs and Symptoms  Outcome: Ongoing, Progressing     Problem: Adult Inpatient Plan of Care  Goal: Plan of Care Review  Outcome: Ongoing, Progressing  Goal: Patient-Specific Goal (Individualized)  Outcome: Ongoing, Progressing  Goal: Absence of Hospital-Acquired Illness or Injury  Outcome: Ongoing, Progressing  Goal: Optimal Comfort and Wellbeing  Outcome: Ongoing, Progressing  Goal: Readiness for Transition of Care  Outcome: Ongoing, Progressing     Problem: Infection  Goal: Absence of Infection Signs and Symptoms  Outcome: Ongoing, Progressing     Problem: Fluid and Electrolyte Imbalance (Acute Kidney Injury/Impairment)  Goal: Fluid and Electrolyte Balance  Outcome: Ongoing, Progressing     Problem: Oral Intake Inadequate (Acute Kidney Injury/Impairment)  Goal: Optimal Nutrition Intake  Outcome: Ongoing, Progressing     Problem: Renal Function Impairment (Acute Kidney Injury/Impairment)  Goal: Effective Renal Function  Outcome: Ongoing, Progressing     Problem: Fluid Imbalance (Pneumonia)  Goal: Fluid Balance  Outcome: Ongoing, Progressing     Problem: Infection (Pneumonia)  Goal: Resolution of Infection Signs and Symptoms  Outcome: Ongoing, Progressing     Problem: Respiratory Compromise (Pneumonia)  Goal: Effective Oxygenation and Ventilation  Outcome: Ongoing, Progressing     Problem: Diabetes Comorbidity  Goal: Blood Glucose Level Within Targeted Range  Outcome: Ongoing, Progressing     Problem: Fall Injury Risk  Goal: Absence of Fall and Fall-Related Injury  Outcome: Ongoing, Progressing

## 2022-10-28 NOTE — PROGRESS NOTES
"Progress Note  Nephrology    Consult Requested By: Armani Gurrola MD    Reason for Consult: ESRD, dependent on dialysis    SUBJECTIVE:     History of Present Illness:  33 y/o female patient known to our practice, has out patient dialysis 3x wk on TTS schedule.  She did go to her dialysis tx yesterday and was seen by me.  She presents to ER today w/complaint of chest pain and abd pain.  Has electrolyte derrangements, BNP >4000.  Nephrology is consulted for dialysis orders.    Echo:  The left ventricle is normal in size with moderate moderate asymmetric hypertrophy eccentric hypertrophy and normal systolic function.  The estimated ejection fraction is 55%.  Indeterminate left ventricular diastolic function with normal left atrial pressure.  There are segmental left ventricular wall motion abnormalities.  Atrial fibrillation not observed.  Normal right ventricular size with normal right ventricular systolic function.  There is right ventricular hypertrophy.  There is abnormal septal wall motion. There is systolic flattening of the interventricular septum consistent with right ventricle pressure overload.  Mild to moderate tricuspid regurgitation.  Normal central venous pressure (3 mmHg).  The estimated PA systolic pressure is 35 mmHg.  Moderate circumferential pericardial effusion. Effusion is fluid.     1. Small to moderate pericardial effusion without any evidence of tamponade  No ventricular interdependence or right atrial right ventricle collapse  2. Left ventricle hypertrophy with posterior wall thicker than septum  Septal hypokinesis noted  3. Pulmonary hypertension mild  4. Prominent eustachian ridge in the right atrium     Interval History:  10/27- seen on HD  10/28- got off 3L yest- echo reviewed- case discussed with cardiology and they will see patient    My conversation with cardiology Dr. Hart-   "Patient has a very thick heart with dyskinetic motion of the septum question of amyloid or infiltrative " "cardiomyopathy.  The pericardial effusion is small and there is no evidence of tamponade or constriction on echo.  I would recommend CT to evaluate pericardial thickness and exact size of the effusion.  I will order a speckled tracking to see if there is any evidence of amyloid type pattern on her echo.  She should have nuclear stress test EKGs in July did not demonstrates severe LVH in actually showed low voltage which would be compatible with amyloid."    Assessment/plan:    ESRD on HD TTS  HTN/CHF/New echo findings- ? Amyloidosis   Hypokalemia  SHPT  Anemia - worse    - continue HD TTS  - reviewed echo and have consulted cardiology because the abnormal septal wall motion is new- will try more UF because I am concerned that BNP is rising...have added IUF for today  - adjusted dialysate  - transfused 10/26- Hb didn't really bump- ordered SHELLEY 150u/kg with HD TTS- will also transfuse 1 unit again today and recheck stool for blood along with paraprotein labs- based on workup will decide if we have to consult cardiology    Review of Systems:  Neg    OBJECTIVE:     Vital Signs Range (Last 24H):  Temp:  [98 °F (36.7 °C)-99.1 °F (37.3 °C)]   Pulse:  []   Resp:  [16-20]   BP: ()/()   SpO2:  [88 %-99 %]     Physical Exam:  General- NAD noted  HEENT- WNL  Neck- supple  CV- Regular rate and rhythm  Resp- Lungs CTA Bilaterally, No increased WOB  GI- Non tender/non-distended, BS normoactive x4 quads  Extrem- No cyanosis, clubbing, edema.  Derm- skin w/d  Neuro-  No flap.     Body mass index is 23.78 kg/m².    Laboratory:  CBC:   Recent Labs   Lab 10/27/22  0640   WBC 15.58*   RBC 2.66*   HGB 7.7*   HCT 22.2*   *   MCV 84   MCH 28.9   MCHC 34.7       CMP:   Recent Labs   Lab 10/26/22  1235 10/27/22  0640 10/28/22  0811   *   < > 210*   CALCIUM 8.0*   < > 8.8   ALBUMIN 2.8*  --   --    PROT 6.2  --   --    *   < > 138   K 2.9*   < > 3.4*   CO2 31*   < > 29   CL 93*   < > 102   BUN 23*   < > " 27*   CREATININE 3.0*   < > 3.0*   ALKPHOS 91  --   --    ALT 13  --   --    AST 22  --   --    BILITOT 2.3*  --   --     < > = values in this interval not displayed.         Diagnostic Results:  Labs: Reviewed      ASSESSMENT/PLAN:     Active Hospital Problems    Diagnosis  POA    *Volume overload with pulmonary edema [E87.70]  Yes    Chest pain [R07.9]  Yes    Hyponatremia [E87.1]  Yes    Hypokalemia [E87.6]  Yes    Anemia, acute on chronic [D64.9]  Yes     Chronic    Thrombocytopenia [D69.6]  Yes     Chronic    Chronic abdominal pain [R10.9, G89.29]  Yes     Chronic    Hyperglycemia [R73.9]  Yes    HTN (hypertension) [I10]  Yes    Deep vein thrombosis (DVT) of non-extremity vein [I82.90]  Yes    ESRD (end stage renal disease) [N18.6]  Yes    Gastroparesis [K31.84]  Yes    Sickle cell trait [D57.3]  Yes    Pericardial effusion [I31.39]  Yes    Type 2 insulin-dependent diabetes mellitus, previous HbA1c 6.2%, with hyperglycemia [E11.9]  Yes     Chronic    SVT (supraventricular tachycardia) [I47.1]  Yes      Resolved Hospital Problems   No resolved problems to display.     Patient care time was spent personally by me on the following activities: > 35 minutes  Obtaining a history.  Examination of patient.  Providing medical care at the patients bedside.  Developing a treatment plan with patient or surrogate and bedside caregivers.  Ordering and reviewing laboratory studies, radiographic studies, pulse oximetry.  Ordering and performing treatments and interventions.  Evaluation of patient's response to treatment.  Discussions with consultants while on the unit and immediately available to the patient.  Re-evaluation of the patient's condition.  Documentation in the medical record.        Thank you for allowing us to participate in the care of your patient. We will follow the patient and provide recommendations as needed.        Prateek Clark MD

## 2022-10-28 NOTE — SUBJECTIVE & OBJECTIVE
Interval History:     Review of Systems  Objective:     Vital Signs (Most Recent):  Temp: 97.4 °F (36.3 °C) (10/28/22 1200)  Pulse: 81 (10/28/22 1300)  Resp: 18 (10/28/22 1110)  BP: 103/60 (10/28/22 1300)  SpO2: (!) 94 % (10/28/22 0725)   Vital Signs (24h Range):  Temp:  [97.4 °F (36.3 °C)-99.1 °F (37.3 °C)] 97.4 °F (36.3 °C)  Pulse:  [] 81  Resp:  [16-20] 18  SpO2:  [88 %-98 %] 94 %  BP: ()/() 103/60     Weight: 59 kg (130 lb)  Body mass index is 23.78 kg/m².    Intake/Output Summary (Last 24 hours) at 10/28/2022 1350  Last data filed at 10/28/2022 1200  Gross per 24 hour   Intake 962 ml   Output --   Net 962 ml      Physical Exam  Vitals and nursing note reviewed.   HENT:      Head: Normocephalic and atraumatic.   Eyes:      Conjunctiva/sclera: Conjunctivae normal.   Neck:      Vascular: No JVD.   Cardiovascular:      Heart sounds: Normal heart sounds.   Pulmonary:      Effort: Pulmonary effort is normal.      Breath sounds: Normal breath sounds.   Abdominal:      Palpations: Abdomen is soft.   Musculoskeletal:         General: Normal range of motion.   Skin:     General: Skin is warm.   Neurological:      Mental Status: She is alert.   Psychiatric:         Mood and Affect: Mood normal.       Significant Labs: All pertinent labs within the past 24 hours have been reviewed.  BMP:   Recent Labs   Lab 10/27/22  0640 10/28/22  0811   * 210*    138   K 4.9 3.4*    102   CO2 22* 29   BUN 21* 27*   CREATININE 2.5* 3.0*   CALCIUM 9.3 8.8   MG 2.1  --      CBC:   Recent Labs   Lab 10/27/22  0640 10/28/22  0811   WBC 15.58* 13.35*   HGB 7.7* 7.0*   HCT 22.2* 20.1*   * 100*     CMP:   Recent Labs   Lab 10/27/22  0640 10/28/22  0811    138   K 4.9 3.4*    102   CO2 22* 29   * 210*   BUN 21* 27*   CREATININE 2.5* 3.0*   CALCIUM 9.3 8.8   ANIONGAP 12 7*       Significant Imaging: I have reviewed all pertinent imaging results/findings within the past 24 hours.

## 2022-10-28 NOTE — PROGRESS NOTES
10/28/22 1430   Vital Signs   Temp 97.6 °F (36.4 °C)   Temp src Axillary   Pulse 84   Resp 20   SpO2 95 %   Pulse Oximetry Type Intermittent   O2 Device (Oxygen Therapy) room air   BP (!) 152/78   Assessments (Pre/Post)   Transport Modality bed   Level of Consciousness (AVPU) alert   Dialyzer Clearance mildly streaked   Post-Hemodialysis Assessment   Rinseback Volume (mL) 250 mL   Dialyzer Clearance Lightly streaked   Duration of Treatment 180 minutes   Additional Fluid Intake (mL) 500 mL   Total UF (mL) 2850 mL   Net Fluid Removal 2000   Patient Response to Treatment tolerated fair   Post-Treatment Weight 50.2 kg (110 lb 10.7 oz)   Treatment Weight Change -1.8   Post-Hemodialysis Comments tx complete, pt stable

## 2022-10-28 NOTE — CONSULTS
[facility]  Cardiology  Consult Note    Patient Name: Tabby Howard  MRN: [id number]  Admission Date: 10/26/2022  Hospital Length of Stay: 1 days  Code Status: Full Code   Attending Provider: Armani Gurrola MD   Consulting Provider: Osbaldo Hart MD  Primary Care Physician: Primary Doctor No  Principal Problem:Volume overload    Patient information was obtained from patient, past medical records, and ER records.     Consults  Subjective:     Chief Complaint:   Shortness of breath and cardiomyopathy    HPI:  32-year-old woman who is admitted with heart failure fluid overload.  She is a chronic dialysis patient-echocardiogram showed pericardial effusion, significant left ventricle hypertrophy, and mild pulmonary hypertension action fraction was recorded at 55%.  She has speckled tracking that showed diminished global longitudinal strain but apical sparing which is consistent with but not diagnostic of amyloid  Pericardial effusion was noted appears to be smaller than noted previously and there is no evidence of tamponade or constriction by hemodynamic measurements on the echo  Patient relates that she has just recently been started on dialysis.  She has had high blood pressure but not untreated high blood pressure for years  .  EKGs demonstrate left axis but no LVH chest x-ray demonstrates-  Bilateral perihilar interstitial lung opacities with enlarged cardiomediastinal silhouette could reflect CHF with pulmonary edema.  Patient is unreliable historian and complaining of abdominal and back pain.  Had hospitalizations for this previously  He has long history of diabetes mellitus that is not been under control  She states she has only recently had hypertension the past several years but did not have it when she was in her 19  She has been admitted proximally urine have 2 years ago to [facility] other hospitals with high blood pressure and some heart failure but normal renal function  There is no  family history of significant heart disease  She has denies using drugs           Past Medical History:   Diagnosis Date    CKD (chronic kidney disease), stage IV 2022    Diabetes mellitus due to underlying condition with unspecified complications 2022    Gastroparesis 2022    Heart failure with preserved ejection fraction 2022    EF 55% on 3/22    History of gastroesophageal reflux (GERD)     History of supraventricular tachycardia     Hyperkalemia 2022    Hypertensive emergency 2022    Sickle cell trait 2022    Type 2 diabetes mellitus        Past Surgical History:   Procedure Laterality Date     SECTION      x 3    COLONOSCOPY      COLONOSCOPY N/A 2022    Procedure: COLONOSCOPY;  Surgeon: Jagdeep Cedeno MD;  Location: [facility];  Service: Endoscopy;  Laterality: N/A;    ESOPHAGOGASTRODUODENOSCOPY N/A 10/18/2019    Procedure: ESOPHAGOGASTRODUODENOSCOPY (EGD);  Surgeon: Gianluca Mendez MD;  Location: [facility];  Service: Endoscopy;  Laterality: N/A;    ESOPHAGOGASTRODUODENOSCOPY N/A 2022    Procedure: EGD (ESOPHAGOGASTRODUODENOSCOPY);  Surgeon: Micky Paredes III, MD;  Location: [facility];  Service: Endoscopy;  Laterality: N/A;    LAPAROSCOPIC CHOLECYSTECTOMY N/A 2022    Procedure: CHOLECYSTECTOMY, LAPAROSCOPIC;  Surgeon: Grey Perez MD;  Location: [facility];  Service: General;  Laterality: N/A;    PLACEMENT OF DUAL-LUMEN VASCULAR CATHETER Left 2022    Procedure: INSERTION-CATHETER-JOSEPH;  Surgeon: Dionte Gan MD;  Location: [facility] OR;  Service: General;  Laterality: Left;    PLACEMENT OF DUAL-LUMEN VASCULAR CATHETER Right 2022    Procedure: INSERTION-CATHETER-Hemosplit;  Surgeon: Dionte Gan MD;  Location: [facility] OR;  Service: General;  Laterality: Right;       Review of patient's allergies indicates:   Allergen Reactions    Penicillins Hives       No current facility-administered medications on file prior to encounter.      Current Outpatient Medications on File Prior to Encounter   Medication Sig    amLODIPine (NORVASC) 10 MG tablet Take 1 tablet (10 mg total) by mouth once daily.    apixaban (ELIQUIS) 5 mg Tab Take 1 tablet (5 mg total) by mouth 2 (two) times daily. For second month on.    cloNIDine 0.2 mg/24 hr td ptwk (CATAPRES) 0.2 mg/24 hr Place 1 patch onto the skin every 7 days.    albuterol (PROVENTIL/VENTOLIN HFA) 90 mcg/actuation inhaler Inhale 2 puffs into the lungs every 6 (six) hours as needed for Wheezing. Rescue    carvediloL (COREG) 25 MG tablet Take 1 tablet (25 mg total) by mouth 2 (two) times daily.    famotidine (PEPCID) 20 MG tablet Take 1 tablet (20 mg total) by mouth once daily.    FLUoxetine 20 MG capsule Take 1 capsule (20 mg total) by mouth once daily.    furosemide (LASIX) 40 MG tablet Take 1 tablet (40 mg total) by mouth 2 (two) times daily.    hydrALAZINE (APRESOLINE) 100 MG tablet Take 1 tablet (100 mg total) by mouth every 8 (eight) hours.    insulin aspart U-100 (NOVOLOG) 100 unit/mL (3 mL) InPn pen Inject 1-10 Units into the skin before meals and at bedtime as needed (Hyperglycemia).    isosorbide mononitrate (IMDUR) 60 MG 24 hr tablet Take 2 tablets (120 mg total) by mouth once daily.    LANTUS U-100 INSULIN 100 unit/mL injection Inject 5 Units into the skin once daily.    levETIRAcetam (KEPPRA) 500 MG Tab Take 1 tablet (500 mg total) by mouth 2 (two) times daily.    metoclopramide HCl (REGLAN) 10 MG tablet Take 1 tablet (10 mg total) by mouth every 6 (six) hours.    ondansetron (ZOFRAN-ODT) 4 MG TbDL Take 1 tablet (4 mg total) by mouth every 8 (eight) hours as needed (nausea, vomiting).    pantoprazole (PROTONIX) 40 MG tablet Take 1 tablet (40 mg total) by mouth once daily.    vancomycin (VANCOCIN) 125 MG capsule TAKE 1 CAPSULE BY MOUTH FOUR TIMES DAILY FOR 10 DAYS (Patient taking differently: Take 125 mg by mouth 4 (four) times daily.)    [DISCONTINUED] atenoloL (TENORMIN) 50 MG tablet Take 1  tablet (50 mg total) by mouth every other day.    [DISCONTINUED] omeprazole (PRILOSEC) 20 MG capsule Take 2 capsules (40 mg total) by mouth once daily. for 10 days    [DISCONTINUED] torsemide (DEMADEX) 20 MG Tab Take 1 tablet (20 mg total) by mouth 2 (two) times a day. (Patient taking differently: Take 20 mg by mouth once daily.)     Family History       Problem Relation (Age of Onset)    Diabetes Mother, Father          Tobacco Use    Smoking status: Never    Smokeless tobacco: Never   Substance and Sexual Activity    Alcohol use: Not Currently    Drug use: No    Sexual activity: Not Currently     Partners: Male     Birth control/protection: I.U.D.     ROS  Objective:     Vital Signs (Most Recent):  Temp: 97.4 °F (36.3 °C) (10/28/22 1721)  Pulse: 85 (10/28/22 1721)  Resp: 20 (10/28/22 1721)  BP: 131/88 (10/28/22 1721)  SpO2: 97 % (10/28/22 1721) Vital Signs (24h Range):  Temp:  [97.4 °F (36.3 °C)-98.9 °F (37.2 °C)] 97.4 °F (36.3 °C)  Pulse:  [72-98] 85  Resp:  [16-20] 20  SpO2:  [88 %-98 %] 97 %  BP: ()/() 131/88     Weight: 59 kg (130 lb)  Body mass index is 23.78 kg/m².    SpO2: 97 %  O2 Device (Oxygen Therapy): room air      Intake/Output Summary (Last 24 hours) at 10/28/2022 1832  Last data filed at 10/28/2022 1430  Gross per 24 hour   Intake 1222 ml   Output 2850 ml   Net -1628 ml       Lines/Drains/Airways       Central Venous Catheter Line  Duration                  Hemodialysis Catheter 07/26/22 1457 right internal jugular 94 days              Peripheral Intravenous Line  Duration                  Peripheral IV - Single Lumen 10/26/22 1334 20 G Posterior;Right Forearm 2 days                    Physical Exam of thin woman blood pressure is 140/80 supine with less than 5 mm of pulses paradoxic us  Tongue is normal  Neck no definitive bruits  Lungs are clear(post dialysis)  Cardiac S4 tachycardia  Abdomen is soft but tender  Extremities she has good pulses with no edema  Neurologic  intact    Significant Labs: CMP   Recent Labs   Lab 10/27/22  0640 10/28/22  0811    138   K 4.9 3.4*    102   CO2 22* 29   * 210*   BUN 21* 27*   CREATININE 2.5* 3.0*   CALCIUM 9.3 8.8   ANIONGAP 12 7*   , CBC   Recent Labs   Lab 10/27/22  0640 10/28/22  0811   WBC 15.58* 13.35*   HGB 7.7* 7.0*   HCT 22.2* 20.1*   * 100*   , and Troponin   Recent Labs   Lab 10/27/22  0004 10/27/22  0640 10/27/22  1319   TROPONINI 0.091* 0.159* 0.250*       Significant Imagin. Small to moderate pericardial effusion without any evidence of tamponade  No ventricular interdependence or right atrial right ventricle collapse  2. Left ventricle hypertrophy with posterior wall thicker than septum  Septal hypokinesis noted  3. Pulmonary hypertension mild  4. Prominent eustachian ridge in the right atrium     Summary    The left ventricular global longitudinal strain is --11.45%%.  Moderate concentric hypertrophy and moderately decreased systolic function.  The estimated ejection fraction is 34%.  Left ventricular diastolic dysfunction.  Strain study demonstrates apical sparing with normal G LS at the apex a -20 and then in the midportion bases -10- 7  Strain study is compatible with amyloid but not diagnostic of amyloid         Assessment and Plan:   The patient has heart failure possibly due to image rate of cardiomyopathy such as amyloid  The echo demonstrates LVH not reflected on the EKG and speckled tracking demonstrates apical sparing consistent with amyloid but not diagnostic  She does have pericardial effusion but there is no evidence of tamponade or constriction other on physical examination or by echo  Recommend-change carvedilol over to metoprolol for better beta blockade  Await results of CT scan  May order technetium pyrophosphate scan which can help diagnose amyloid  I personally reviewed old and new ecg's, lab reports,, xray reports  and  other cardiovascular studies including  echo's, stress  tests, angiogram reports, holters,and vascular studies .  In addition I evaluated original cardiac cath  ___echo  ____cxr ______ct ____scan on [employer] view or tweetTV or other viewing platforms .  I reviewed  office and hospital notes Yes ___x_ of  referring providers.          Active Diagnoses:    Diagnosis Date Noted POA    PRINCIPAL PROBLEM:  Volume overload with pulmonary edema [E87.70] 10/04/2022 Yes    Chest pain [R07.9] 10/26/2022 Yes    Hyponatremia [E87.1] 10/26/2022 Yes    Hypokalemia [E87.6] 10/26/2022 Yes    Anemia, acute on chronic [D64.9] 10/26/2022 Yes     Chronic    Thrombocytopenia [D69.6] 10/26/2022 Yes     Chronic    Chronic abdominal pain [R10.9, G89.29] 10/26/2022 Yes     Chronic    Hyperglycemia [R73.9] 10/26/2022 Yes    HTN (hypertension) [I10] 07/30/2022 Yes    Deep vein thrombosis (DVT) of non-extremity vein [I82.90] 07/26/2022 Yes    ESRD (end stage renal disease) [N18.6] 07/22/2022 Yes    Gastroparesis [K31.84] 04/12/2022 Yes    Sickle cell trait [D57.3] 04/12/2022 Yes    Pericardial effusion [I31.39] 03/07/2022 Yes    Type 2 insulin-dependent diabetes mellitus, previous HbA1c 6.2%, with hyperglycemia [E11.9] 10/16/2019 Yes     Chronic    SVT (supraventricular tachycardia) [I47.1] 09/15/2018 Yes      Problems Resolved During this Admission:       VTE Risk Mitigation (From admission, onward)           Ordered     apixaban tablet 5 mg  2 times daily         10/26/22 1747     IP VTE HIGH RISK PATIENT  Once         10/26/22 1747     Place sequential compression device  Until discontinued         10/26/22 1747                    Thank you for your consult.     Osbaldo Hart MD  Cardiology   [facility]

## 2022-10-29 ENCOUNTER — HOSPITAL ENCOUNTER (INPATIENT)
Facility: HOSPITAL | Age: 32
LOS: 3 days | Discharge: HOME | DRG: 124 | End: 2022-11-01
Attending: INTERNAL MEDICINE | Admitting: INTERNAL MEDICINE
Payer: MEDICAID

## 2022-10-29 VITALS
DIASTOLIC BLOOD PRESSURE: 89 MMHG | BODY MASS INDEX: 23.92 KG/M2 | TEMPERATURE: 98 F | HEIGHT: 62 IN | WEIGHT: 130 LBS | HEART RATE: 86 BPM | OXYGEN SATURATION: 98 % | SYSTOLIC BLOOD PRESSURE: 162 MMHG | RESPIRATION RATE: 18 BRPM

## 2022-10-29 DIAGNOSIS — K31.84 GASTROPARESIS: Chronic | ICD-10-CM

## 2022-10-29 DIAGNOSIS — R94.31 QT PROLONGATION: ICD-10-CM

## 2022-10-29 DIAGNOSIS — N18.6 TYPE 2 DIABETES MELLITUS WITH CHRONIC KIDNEY DISEASE ON CHRONIC DIALYSIS, WITH LONG-TERM CURRENT USE OF INSULIN: Chronic | ICD-10-CM

## 2022-10-29 DIAGNOSIS — E11.22 TYPE 2 DIABETES MELLITUS WITH CHRONIC KIDNEY DISEASE ON CHRONIC DIALYSIS, WITH LONG-TERM CURRENT USE OF INSULIN: Chronic | ICD-10-CM

## 2022-10-29 DIAGNOSIS — H54.61 VISION LOSS, RIGHT EYE: Primary | ICD-10-CM

## 2022-10-29 DIAGNOSIS — Z99.2 TYPE 2 DIABETES MELLITUS WITH CHRONIC KIDNEY DISEASE ON CHRONIC DIALYSIS, WITH LONG-TERM CURRENT USE OF INSULIN: Chronic | ICD-10-CM

## 2022-10-29 DIAGNOSIS — E87.70 VOLUME OVERLOAD: ICD-10-CM

## 2022-10-29 DIAGNOSIS — I31.39 PERICARDIAL EFFUSION: ICD-10-CM

## 2022-10-29 DIAGNOSIS — Z79.4 TYPE 2 DIABETES MELLITUS WITH CHRONIC KIDNEY DISEASE ON CHRONIC DIALYSIS, WITH LONG-TERM CURRENT USE OF INSULIN: Chronic | ICD-10-CM

## 2022-10-29 PROBLEM — J18.9 HCAP (HEALTHCARE-ASSOCIATED PNEUMONIA): Status: RESOLVED | Noted: 2022-07-22 | Resolved: 2022-10-29

## 2022-10-29 PROBLEM — I82.90 DEEP VEIN THROMBOSIS (DVT) OF NON-EXTREMITY VEIN: Chronic | Status: ACTIVE | Noted: 2022-07-26

## 2022-10-29 PROBLEM — D64.9 SYMPTOMATIC ANEMIA: Status: RESOLVED | Noted: 2022-10-06 | Resolved: 2022-10-29

## 2022-10-29 PROBLEM — R73.9 HYPERGLYCEMIA WITHOUT KETOSIS: Status: RESOLVED | Noted: 2022-07-22 | Resolved: 2022-10-29

## 2022-10-29 PROBLEM — N18.4 CKD (CHRONIC KIDNEY DISEASE), STAGE IV: Status: RESOLVED | Noted: 2022-04-12 | Resolved: 2022-10-29

## 2022-10-29 PROBLEM — H54.7 VISION LOSS: Status: ACTIVE | Noted: 2022-10-29

## 2022-10-29 PROBLEM — I10 PRIMARY HYPERTENSION: Chronic | Status: ACTIVE | Noted: 2022-07-30

## 2022-10-29 PROBLEM — N17.9 ACUTE RENAL FAILURE SUPERIMPOSED ON CHRONIC KIDNEY DISEASE: Status: RESOLVED | Noted: 2022-07-07 | Resolved: 2022-10-29

## 2022-10-29 PROBLEM — D57.3 SICKLE CELL TRAIT: Chronic | Status: ACTIVE | Noted: 2022-04-12

## 2022-10-29 PROBLEM — I67.83 PRES (POSTERIOR REVERSIBLE ENCEPHALOPATHY SYNDROME): Status: RESOLVED | Noted: 2022-05-30 | Resolved: 2022-10-29

## 2022-10-29 PROBLEM — N18.9 ACUTE RENAL FAILURE SUPERIMPOSED ON CHRONIC KIDNEY DISEASE: Status: RESOLVED | Noted: 2022-07-07 | Resolved: 2022-10-29

## 2022-10-29 LAB
ANION GAP SERPL CALC-SCNC: 8 MMOL/L (ref 8–16)
BASOPHILS # BLD AUTO: 0.04 K/UL (ref 0–0.2)
BASOPHILS NFR BLD: 0.4 % (ref 0–1.9)
BUN SERPL-MCNC: 39 MG/DL (ref 6–20)
CALCIUM SERPL-MCNC: 8.4 MG/DL (ref 8.7–10.5)
CHLORIDE SERPL-SCNC: 98 MMOL/L (ref 95–110)
CO2 SERPL-SCNC: 25 MMOL/L (ref 23–29)
CREAT SERPL-MCNC: 4.2 MG/DL (ref 0.5–1.4)
DIFFERENTIAL METHOD: ABNORMAL
EOSINOPHIL # BLD AUTO: 0.2 K/UL (ref 0–0.5)
EOSINOPHIL NFR BLD: 1.5 % (ref 0–8)
ERYTHROCYTE [DISTWIDTH] IN BLOOD BY AUTOMATED COUNT: 15.6 % (ref 11.5–14.5)
EST. GFR  (NO RACE VARIABLE): 13.6 ML/MIN/1.73 M^2
GLUCOSE SERPL-MCNC: 139 MG/DL (ref 70–110)
GLUCOSE SERPL-MCNC: 142 MG/DL (ref 70–110)
GLUCOSE SERPL-MCNC: 70 MG/DL (ref 70–110)
HCT VFR BLD AUTO: 28.8 % (ref 37–48.5)
HGB BLD-MCNC: 10.1 G/DL (ref 12–16)
IMM GRANULOCYTES # BLD AUTO: 0.06 K/UL (ref 0–0.04)
IMM GRANULOCYTES NFR BLD AUTO: 0.6 % (ref 0–0.5)
LACTATE SERPL-SCNC: 0.7 MMOL/L (ref 0.5–1.9)
LYMPHOCYTES # BLD AUTO: 1.9 K/UL (ref 1–4.8)
LYMPHOCYTES NFR BLD: 17.8 % (ref 18–48)
MCH RBC QN AUTO: 29.4 PG (ref 27–31)
MCHC RBC AUTO-ENTMCNC: 35.1 G/DL (ref 32–36)
MCV RBC AUTO: 84 FL (ref 82–98)
MONOCYTES # BLD AUTO: 1.1 K/UL (ref 0.3–1)
MONOCYTES NFR BLD: 10.1 % (ref 4–15)
NEUTROPHILS # BLD AUTO: 7.4 K/UL (ref 1.8–7.7)
NEUTROPHILS NFR BLD: 69.6 % (ref 38–73)
NRBC BLD-RTO: 1 /100 WBC
PLATELET # BLD AUTO: 135 K/UL (ref 150–450)
PMV BLD AUTO: 10.1 FL (ref 9.2–12.9)
POCT GLUCOSE: 336 MG/DL (ref 70–110)
POTASSIUM SERPL-SCNC: 3.2 MMOL/L (ref 3.5–5.1)
RBC # BLD AUTO: 3.44 M/UL (ref 4–5.4)
SODIUM SERPL-SCNC: 131 MMOL/L (ref 136–145)
WBC # BLD AUTO: 10.62 K/UL (ref 3.9–12.7)

## 2022-10-29 PROCEDURE — 99223 1ST HOSP IP/OBS HIGH 75: CPT | Mod: ,,, | Performed by: HOSPITALIST

## 2022-10-29 PROCEDURE — 25000003 PHARM REV CODE 250: Performed by: INTERNAL MEDICINE

## 2022-10-29 PROCEDURE — 90935 HEMODIALYSIS ONE EVALUATION: CPT

## 2022-10-29 PROCEDURE — 12000002 HC ACUTE/MED SURGE SEMI-PRIVATE ROOM

## 2022-10-29 PROCEDURE — 99223 PR INITIAL HOSPITAL CARE,LEVL III: ICD-10-PCS | Mod: ,,, | Performed by: HOSPITALIST

## 2022-10-29 PROCEDURE — 85025 COMPLETE CBC W/AUTO DIFF WBC: CPT | Performed by: INTERNAL MEDICINE

## 2022-10-29 PROCEDURE — 25000003 PHARM REV CODE 250: Performed by: SPECIALIST

## 2022-10-29 PROCEDURE — 83605 ASSAY OF LACTIC ACID: CPT | Performed by: INTERNAL MEDICINE

## 2022-10-29 PROCEDURE — 36415 COLL VENOUS BLD VENIPUNCTURE: CPT | Performed by: INTERNAL MEDICINE

## 2022-10-29 PROCEDURE — 25000003 PHARM REV CODE 250: Performed by: NURSE PRACTITIONER

## 2022-10-29 PROCEDURE — 25000003 PHARM REV CODE 250: Performed by: HOSPITALIST

## 2022-10-29 PROCEDURE — 25500020 PHARM REV CODE 255: Performed by: EMERGENCY MEDICINE

## 2022-10-29 PROCEDURE — 63600175 PHARM REV CODE 636 W HCPCS: Mod: JG | Performed by: INTERNAL MEDICINE

## 2022-10-29 PROCEDURE — 80048 BASIC METABOLIC PNL TOTAL CA: CPT | Performed by: INTERNAL MEDICINE

## 2022-10-29 PROCEDURE — 63600175 PHARM REV CODE 636 W HCPCS: Mod: JG | Performed by: HOSPITALIST

## 2022-10-29 PROCEDURE — 63600175 PHARM REV CODE 636 W HCPCS: Performed by: INTERNAL MEDICINE

## 2022-10-29 RX ORDER — ALPRAZOLAM 0.5 MG/1
0.5 TABLET ORAL ONCE
Status: DISCONTINUED | OUTPATIENT
Start: 2022-10-29 | End: 2022-10-29

## 2022-10-29 RX ORDER — INSULIN ASPART 100 [IU]/ML
1-10 INJECTION, SOLUTION INTRAVENOUS; SUBCUTANEOUS
Status: DISCONTINUED | OUTPATIENT
Start: 2022-10-29 | End: 2022-10-30

## 2022-10-29 RX ORDER — GLUCAGON 1 MG
1 KIT INJECTION
Status: DISCONTINUED | OUTPATIENT
Start: 2022-10-29 | End: 2022-11-01 | Stop reason: HOSPADM

## 2022-10-29 RX ORDER — SODIUM CHLORIDE 0.9 % (FLUSH) 0.9 %
10 SYRINGE (ML) INJECTION EVERY 6 HOURS PRN
Status: DISCONTINUED | OUTPATIENT
Start: 2022-10-29 | End: 2022-11-01 | Stop reason: HOSPADM

## 2022-10-29 RX ORDER — AMLODIPINE BESYLATE 10 MG/1
10 TABLET ORAL DAILY
Status: DISCONTINUED | OUTPATIENT
Start: 2022-10-30 | End: 2022-11-01 | Stop reason: HOSPADM

## 2022-10-29 RX ORDER — HYDROCODONE BITARTRATE AND ACETAMINOPHEN 5; 325 MG/1; MG/1
1 TABLET ORAL EVERY 6 HOURS PRN
Status: DISCONTINUED | OUTPATIENT
Start: 2022-10-29 | End: 2022-11-01 | Stop reason: HOSPADM

## 2022-10-29 RX ORDER — IBUPROFEN 200 MG
24 TABLET ORAL
Status: DISCONTINUED | OUTPATIENT
Start: 2022-10-29 | End: 2022-11-01 | Stop reason: HOSPADM

## 2022-10-29 RX ORDER — HYDRALAZINE HYDROCHLORIDE 25 MG/1
100 TABLET, FILM COATED ORAL EVERY 8 HOURS
Status: DISCONTINUED | OUTPATIENT
Start: 2022-10-29 | End: 2022-10-31

## 2022-10-29 RX ORDER — ACETAMINOPHEN 325 MG/1
650 TABLET ORAL EVERY 4 HOURS PRN
Status: DISCONTINUED | OUTPATIENT
Start: 2022-10-29 | End: 2022-11-01 | Stop reason: HOSPADM

## 2022-10-29 RX ORDER — MUPIROCIN 20 MG/G
OINTMENT TOPICAL 2 TIMES DAILY
Status: DISPENSED | OUTPATIENT
Start: 2022-10-29 | End: 2022-10-31

## 2022-10-29 RX ORDER — PANTOPRAZOLE SODIUM 40 MG/1
40 TABLET, DELAYED RELEASE ORAL
Status: DISCONTINUED | OUTPATIENT
Start: 2022-10-30 | End: 2022-11-01 | Stop reason: HOSPADM

## 2022-10-29 RX ORDER — IBUPROFEN 200 MG
16 TABLET ORAL
Status: DISCONTINUED | OUTPATIENT
Start: 2022-10-29 | End: 2022-11-01 | Stop reason: HOSPADM

## 2022-10-29 RX ORDER — HYDRALAZINE HYDROCHLORIDE 20 MG/ML
5 INJECTION INTRAMUSCULAR; INTRAVENOUS EVERY 8 HOURS PRN
Status: DISCONTINUED | OUTPATIENT
Start: 2022-10-29 | End: 2022-11-01 | Stop reason: HOSPADM

## 2022-10-29 RX ORDER — ONDANSETRON 2 MG/ML
4 INJECTION INTRAMUSCULAR; INTRAVENOUS EVERY 8 HOURS PRN
Status: DISCONTINUED | OUTPATIENT
Start: 2022-10-29 | End: 2022-10-30

## 2022-10-29 RX ORDER — HYDROMORPHONE HYDROCHLORIDE 1 MG/ML
0.5 INJECTION, SOLUTION INTRAMUSCULAR; INTRAVENOUS; SUBCUTANEOUS EVERY 4 HOURS PRN
Status: DISCONTINUED | OUTPATIENT
Start: 2022-10-29 | End: 2022-10-30

## 2022-10-29 RX ORDER — TALC
6 POWDER (GRAM) TOPICAL NIGHTLY PRN
Status: DISCONTINUED | OUTPATIENT
Start: 2022-10-29 | End: 2022-11-01 | Stop reason: HOSPADM

## 2022-10-29 RX ORDER — LEVETIRACETAM 500 MG/1
500 TABLET ORAL 2 TIMES DAILY
Status: DISCONTINUED | OUTPATIENT
Start: 2022-10-29 | End: 2022-11-01 | Stop reason: HOSPADM

## 2022-10-29 RX ORDER — NALOXONE HCL 0.4 MG/ML
0.02 VIAL (ML) INJECTION
Status: DISCONTINUED | OUTPATIENT
Start: 2022-10-29 | End: 2022-11-01 | Stop reason: HOSPADM

## 2022-10-29 RX ORDER — ALPRAZOLAM 0.5 MG/1
0.5 TABLET ORAL ONCE
Status: COMPLETED | OUTPATIENT
Start: 2022-10-29 | End: 2022-10-29

## 2022-10-29 RX ADMIN — HYDROMORPHONE HYDROCHLORIDE 0.5 MG: 1 INJECTION, SOLUTION INTRAMUSCULAR; INTRAVENOUS; SUBCUTANEOUS at 08:10

## 2022-10-29 RX ADMIN — ALPRAZOLAM 0.5 MG: 0.5 TABLET ORAL at 05:10

## 2022-10-29 RX ADMIN — HYDROMORPHONE HYDROCHLORIDE 0.5 MG: 1 INJECTION, SOLUTION INTRAMUSCULAR; INTRAVENOUS; SUBCUTANEOUS at 03:10

## 2022-10-29 RX ADMIN — MUPIROCIN 1 G: 20 OINTMENT TOPICAL at 01:10

## 2022-10-29 RX ADMIN — INSULIN ASPART 4 UNITS: 100 INJECTION, SOLUTION INTRAVENOUS; SUBCUTANEOUS at 08:10

## 2022-10-29 RX ADMIN — HYDRALAZINE HYDROCHLORIDE 100 MG: 25 TABLET ORAL at 05:10

## 2022-10-29 RX ADMIN — HYDRALAZINE HYDROCHLORIDE 100 MG: 25 TABLET ORAL at 03:10

## 2022-10-29 RX ADMIN — PANTOPRAZOLE SODIUM 40 MG: 40 TABLET, DELAYED RELEASE ORAL at 05:10

## 2022-10-29 RX ADMIN — METOPROLOL SUCCINATE 50 MG: 50 TABLET, FILM COATED, EXTENDED RELEASE ORAL at 01:10

## 2022-10-29 RX ADMIN — INSULIN DETEMIR 10 UNITS: 100 INJECTION, SOLUTION SUBCUTANEOUS at 08:10

## 2022-10-29 RX ADMIN — AMLODIPINE BESYLATE 10 MG: 5 TABLET ORAL at 01:10

## 2022-10-29 RX ADMIN — ONDANSETRON 4 MG: 2 INJECTION INTRAMUSCULAR; INTRAVENOUS at 08:10

## 2022-10-29 RX ADMIN — HYDROCODONE BITARTRATE AND ACETAMINOPHEN 1 TABLET: 5; 325 TABLET ORAL at 11:10

## 2022-10-29 RX ADMIN — ERYTHROMYCIN LACTOBIONATE 250 MG: 500 INJECTION, POWDER, LYOPHILIZED, FOR SOLUTION INTRAVENOUS at 11:10

## 2022-10-29 RX ADMIN — APIXABAN 5 MG: 5 TABLET, FILM COATED ORAL at 01:10

## 2022-10-29 RX ADMIN — EPOETIN ALFA-EPBX 8900 UNITS: 10000 INJECTION, SOLUTION INTRAVENOUS; SUBCUTANEOUS at 11:10

## 2022-10-29 RX ADMIN — LEVETIRACETAM 500 MG: 500 TABLET, FILM COATED ORAL at 11:10

## 2022-10-29 RX ADMIN — APIXABAN 5 MG: 5 TABLET, FILM COATED ORAL at 08:10

## 2022-10-29 RX ADMIN — IODIXANOL 100 ML: 320 INJECTION, SOLUTION INTRAVASCULAR at 03:10

## 2022-10-29 RX ADMIN — HYDROCODONE BITARTRATE AND ACETAMINOPHEN 1 TABLET: 5; 325 TABLET ORAL at 01:10

## 2022-10-29 RX ADMIN — HYDROMORPHONE HYDROCHLORIDE 0.5 MG: 1 INJECTION, SOLUTION INTRAMUSCULAR; INTRAVENOUS; SUBCUTANEOUS at 01:10

## 2022-10-29 NOTE — PROVIDER TRANSFER
Outside Transfer Acceptance Note / Regional Referral Center    Referring facility: [facility]   Referring provider: MONI HOFFMAN  Accepting facility: [facility]  Accepting provider: STACEY NAQVI  Reason for transfer:  Need Ophthalmology  Transfer diagnosis: Vision change  Transfer specialty requested: Hospital Medicine  Ophthalmology  Transfer specialty notified: yes  Transfer level: NUMBER 1-5: 2  Bed type requested: stepdown  Isolation status: No active isolations   Admission class or status: IP- Inpatient    Narrative     32-year-old female with a history of end-stage renal disease on hemodialysis, diabetes, heart failure, gastroesophageal reflux disease, sickle cell trait, recent C diff, and hypertension admitted to [facility] on October 26 with chest discomfort, dyspnea, and blood tinged sputum.  She has chronic diffuse abdominal and back pain.  She had no fever or chills and no diarrhea.  She was admitted with volume overload, chest pain, and hyponatremia.  She underwent hemodialysis, and she received packed red blood cells.  Troponin was elevated during her stay.  Imaging studies were consistent with, but not diagnostic of, amyloid.  On October 29, she told the provider that she had several days of right eye vision loss.  She was not able to see shapes and everything appeared black.  She was able to see light shined in her eye.  She also noted unusual shaves consistent with floaters in the left eye.  Referring provider spoke with Ophthalmology at [facility].  Requesting transfer to Hospital Medicine at [facility] for further evaluation for vision changes.  With the persistent pain, along with vision changes and abnormal echocardiogram, and more extensive evaluation of her presenting findings may be needed.    October 29:  White blood cells 10.62, hemoglobin 10.1, hematocrit 28.8, platelets 135, sodium 131, potassium 3.2, chloride  98, CO2 25, BUN 39, creatinine 4.2, glucose 139    October 28:  White blood cells 13.35, hemoglobin 7, hematocrit 20.1, platelets 100, BNP greater than 4500  Immunoglobulin light chains, serum protein electrophoresis, and beta 2 microglobulin pending  -echocardiogram had EF 34%, LV diastolic dysfunction.  Strain study compatible with amyloid but not diagnostic of amyloid.    October 27: Troponin 0.159 (repeat 0.250)  -Echocardiogram had ejection fraction 55%, abnormal septal wall motion.  Mild-to-moderate TR.  Moderate circumferential pericardial effusion.  Mild pulmonary hypertension.    October 26: Quantitative hCG 1.9, COVID negative, influenza negative  -chest x-ray showed bilateral perihilar interstitial lung opacities with enlarged cardiomediastinal silhouette.  -CT of the abdomen and pelvis noted opacities at both lung bases suggesting volume overload and/or interstitial pulmonary edema with superimposed multifocal viral or atypical pneumonia or nonspecific pneumonitis not excluded.  Small right pleural effusion and moderate-sized pericardial effusion.  Trace ascites and anasarca.    Objective     Vitals: Temp: 98 °F (36.7 °C) (10/29/22 0915)  Pulse: 80 (10/29/22 1225)  Resp: 20 (10/29/22 1359)  BP: 135/85 (10/29/22 1225)  SpO2: 98 % (10/29/22 0915)  Recent Labs: CBC:   Recent Labs   Lab 10/28/22  0811 10/29/22  0833   WBC 13.35* 10.62   HGB 7.0* 10.1*   HCT 20.1* 28.8*   * 135*     CMP:   Recent Labs   Lab 10/28/22  0811 10/29/22  0833    131*   K 3.4* 3.2*    98   CO2 29 25   * 139*   BUN 27* 39*   CREATININE 3.0* 4.2*   CALCIUM 8.8 8.4*   ANIONGAP 7* 8   Airway:     Vent settings:     IV access:        Peripheral IV - Single Lumen 10/26/22 1334 20 G Posterior;Right Forearm (Active)   Site Assessment Clean;Dry;Intact;No redness;No swelling;No drainage;No warmth 10/29/22 0730   Extremity Assessment Distal to IV No abnormal discoloration;No redness;No swelling;No warmth 10/29/22 0730    Line Status Flushed;Saline locked 10/29/22 0730   Dressing Status Clean;Dry;Intact 10/29/22 0730   Dressing Intervention Integrity maintained 10/29/22 0730   Site Change Due 10/30/22 10/29/22 0730     Allergies:   Review of patient's allergies indicates:   Allergen Reactions    Penicillins Hives      NPO: No    Anticoagulation:   Anticoagulants       Ordered     Route Frequency Start Stop    10/26/22 1747  apixaban         Oral 2 times daily 10/26/22 2100 --             Instructions    Admit to Hospital Medicine  Consult ophthalmology-notify on arrival  May need further  investigation of her abdominal pain if not done at the outside facility prior to transfer  Depending on Ophthalmology findings, may need Neurology evaluation of vision loss  Continue hemodialysis      Upon patient arrival to floor, please send SecureChat to [facility] or call extension [phone] (if no answer, do NOT leave a callback number after the beep, rather please send a SecureChat to [facility]), for Hospital Medicine admit team assignment and for additional admit orders for the patient.  Do not page the attending physician associated with the patient on arrival (this physician may not be on duty at the time of arrival).  Rather, always send a SecureChat to [facility] or call [phone] to reach the triage physician for orders and team assignment.    AFSHAN Curtis MD  Hospital Medicine Staff  Cell: [phone]

## 2022-10-29 NOTE — PROGRESS NOTES
"Progress Note  Nephrology    Consult Requested By: Alexys Gonzalez MD    Reason for Consult: ESRD, dependent on dialysis    SUBJECTIVE:     History of Present Illness:  31 y/o female patient known to our practice, has out patient dialysis 3x wk on TTS schedule.  She did go to her dialysis tx yesterday and was seen by me.  She presents to ER today w/complaint of chest pain and abd pain.  Has electrolyte derrangements, BNP >4000.  Nephrology is consulted for dialysis orders.    Echo:  The left ventricle is normal in size with moderate moderate asymmetric hypertrophy eccentric hypertrophy and normal systolic function.  The estimated ejection fraction is 55%.  Indeterminate left ventricular diastolic function with normal left atrial pressure.  There are segmental left ventricular wall motion abnormalities.  Atrial fibrillation not observed.  Normal right ventricular size with normal right ventricular systolic function.  There is right ventricular hypertrophy.  There is abnormal septal wall motion. There is systolic flattening of the interventricular septum consistent with right ventricle pressure overload.  Mild to moderate tricuspid regurgitation.  Normal central venous pressure (3 mmHg).  The estimated PA systolic pressure is 35 mmHg.  Moderate circumferential pericardial effusion. Effusion is fluid.     1. Small to moderate pericardial effusion without any evidence of tamponade  No ventricular interdependence or right atrial right ventricle collapse  2. Left ventricle hypertrophy with posterior wall thicker than septum  Septal hypokinesis noted  3. Pulmonary hypertension mild  4. Prominent eustachian ridge in the right atrium     Interval History:  10/27- seen on HD  10/28- got off 3L yest- echo reviewed- case discussed with cardiology and they will see patient  10/29  VSS, no lab results as of now.  Seen today on dialysis, states feeling better.    My conversation with cardiology Dr. Hart-   "Patient has a very " "thick heart with dyskinetic motion of the septum question of amyloid or infiltrative cardiomyopathy.  The pericardial effusion is small and there is no evidence of tamponade or constriction on echo.  I would recommend CT to evaluate pericardial thickness and exact size of the effusion.  I will order a speckled tracking to see if there is any evidence of amyloid type pattern on her echo.  She should have nuclear stress test EKGs in July did not demonstrates severe LVH in actually showed low voltage which would be compatible with amyloid."    Assessment/plan:    ESRD on HD TTS  HTN/CHF/New echo findings- ? Amyloidosis   Hypokalemia  SHPT  Anemia - worse    - continue HD TTS  - reviewed echo and consulted cardiology because the abnormal septal wall motion is new- will try more UF because I am concerned that BNP is rising...have added IUF for today.  Cards following  - adjusted dialysate  - transfused 10/26- Hb didn't really bump- ordered SHELLEY 150u/kg with HD TTS- will also transfuse 1 unit again 10/28 and recheck stool for blood along with paraprotein labs- cardiology now following, some labs pending    Review of Systems:  Neg    OBJECTIVE:     Vital Signs Range (Last 24H):  Temp:  [97.4 °F (36.3 °C)-98.7 °F (37.1 °C)]   Pulse:  [72-94]   Resp:  [16-20]   BP: (103-165)/(60-88)   SpO2:  [95 %-99 %]     Physical Exam:  General- NAD noted  HEENT- WNL  Neck- supple  CV- Regular rate and rhythm  Resp- Lungs CTA Bilaterally, No increased WOB  GI- Non tender/non-distended, BS normoactive x4 quads  Extrem- No cyanosis, clubbing, edema.  Derm- skin w/d  Neuro-  No flap.     Body mass index is 23.78 kg/m².    Laboratory:  CBC:   Recent Labs   Lab 10/28/22  0811   WBC 13.35*   RBC 2.40*   HGB 7.0*   HCT 20.1*   *   MCV 84   MCH 29.2   MCHC 34.8       CMP:   Recent Labs   Lab 10/26/22  1235 10/27/22  0640 10/28/22  0811   *   < > 210*   CALCIUM 8.0*   < > 8.8   ALBUMIN 2.8*  --   --    PROT 6.2  --   --    *   < " > 138   K 2.9*   < > 3.4*   CO2 31*   < > 29   CL 93*   < > 102   BUN 23*   < > 27*   CREATININE 3.0*   < > 3.0*   ALKPHOS 91  --   --    ALT 13  --   --    AST 22  --   --    BILITOT 2.3*  --   --     < > = values in this interval not displayed.         Diagnostic Results:  Labs: Reviewed      ASSESSMENT/PLAN:     Active Hospital Problems    Diagnosis  POA    *Volume overload with pulmonary edema [E87.70]  Yes    Chest pain [R07.9]  Yes    Hyponatremia [E87.1]  Yes    Hypokalemia [E87.6]  Yes    Anemia, acute on chronic [D64.9]  Yes     Chronic    Thrombocytopenia [D69.6]  Yes     Chronic    Chronic abdominal pain [R10.9, G89.29]  Yes     Chronic    Hyperglycemia [R73.9]  Yes    HTN (hypertension) [I10]  Yes    Deep vein thrombosis (DVT) of non-extremity vein [I82.90]  Yes    ESRD (end stage renal disease) [N18.6]  Yes    Gastroparesis [K31.84]  Yes    Sickle cell trait [D57.3]  Yes    Pericardial effusion [I31.39]  Yes    Type 2 insulin-dependent diabetes mellitus, previous HbA1c 6.2%, with hyperglycemia [E11.9]  Yes     Chronic    SVT (supraventricular tachycardia) [I47.1]  Yes      Resolved Hospital Problems   No resolved problems to display.     Patient care time was spent personally by me on the following activities: > 35 minutes  Obtaining a history.  Examination of patient.  Providing medical care at the patients bedside.  Developing a treatment plan with patient or surrogate and bedside caregivers.  Ordering and reviewing laboratory studies, radiographic studies, pulse oximetry.  Ordering and performing treatments and interventions.  Evaluation of patient's response to treatment.  Discussions with consultants while on the unit and immediately available to the patient.  Re-evaluation of the patient's condition.  Documentation in the medical record.        Thank you for allowing us to participate in the care of your patient. We will follow the patient and provide recommendations as needed.        Geeta RAMACHANDRAN  Natasha, NP

## 2022-10-29 NOTE — PLAN OF CARE
Patient being transferred to [facility].       10/29/22 1444   Final Note   Assessment Type Final Discharge Note   Anticipated Discharge Disposition Short Term

## 2022-10-29 NOTE — NURSING
Patient transfer report called to SHAILA Benavides at [facility] [address] (@[phone]).  [facility] here for transport on EMS stretcher.  She will be going to room 40846.  Patient's mother also called (Tanya Howard @ [phone]), as per patient's request and notified of the transfer to other facility.

## 2022-10-29 NOTE — NURSING
Pt having anxiety attack while on EMS stretcher. Pt C/O SOB and CP. Pt redirected and reassured by nurse. Pts VSS. 100% on RA. Pt calmed down and states comfort. Dr Gonzalez notified of all of the above. See new orders. EMS with pt and pt will transfer per EMS after ordered xanax given. No distress noted.

## 2022-10-30 LAB
ALBUMIN SERPL BCP-MCNC: 2.8 G/DL (ref 3.5–5.2)
ALP SERPL-CCNC: 123 U/L (ref 55–135)
ALT SERPL W/O P-5'-P-CCNC: 28 U/L (ref 10–44)
ANION GAP SERPL CALC-SCNC: 9 MMOL/L (ref 8–16)
AST SERPL-CCNC: 18 U/L (ref 10–40)
BASOPHILS # BLD AUTO: 0.04 K/UL (ref 0–0.2)
BASOPHILS NFR BLD: 0.4 % (ref 0–1.9)
BILIRUB SERPL-MCNC: 1.5 MG/DL (ref 0.1–1)
BUN SERPL-MCNC: 21 MG/DL (ref 6–20)
CALCIUM SERPL-MCNC: 8.7 MG/DL (ref 8.7–10.5)
CHLORIDE SERPL-SCNC: 105 MMOL/L (ref 95–110)
CO2 SERPL-SCNC: 16 MMOL/L (ref 23–29)
CREAT SERPL-MCNC: 3.7 MG/DL (ref 0.5–1.4)
DIFFERENTIAL METHOD: ABNORMAL
EOSINOPHIL # BLD AUTO: 0.1 K/UL (ref 0–0.5)
EOSINOPHIL NFR BLD: 1.1 % (ref 0–8)
ERYTHROCYTE [DISTWIDTH] IN BLOOD BY AUTOMATED COUNT: 16.2 % (ref 11.5–14.5)
EST. GFR  (NO RACE VARIABLE): 15.9 ML/MIN/1.73 M^2
GLUCOSE SERPL-MCNC: 160 MG/DL (ref 70–110)
HCT VFR BLD AUTO: 34.5 % (ref 37–48.5)
HGB BLD-MCNC: 11.7 G/DL (ref 12–16)
IMM GRANULOCYTES # BLD AUTO: 0.04 K/UL (ref 0–0.04)
IMM GRANULOCYTES NFR BLD AUTO: 0.4 % (ref 0–0.5)
LYMPHOCYTES # BLD AUTO: 1.3 K/UL (ref 1–4.8)
LYMPHOCYTES NFR BLD: 14.1 % (ref 18–48)
MAGNESIUM SERPL-MCNC: 1.8 MG/DL (ref 1.6–2.6)
MCH RBC QN AUTO: 29.8 PG (ref 27–31)
MCHC RBC AUTO-ENTMCNC: 33.9 G/DL (ref 32–36)
MCV RBC AUTO: 88 FL (ref 82–98)
MONOCYTES # BLD AUTO: 0.9 K/UL (ref 0.3–1)
MONOCYTES NFR BLD: 10.1 % (ref 4–15)
NEUTROPHILS # BLD AUTO: 6.8 K/UL (ref 1.8–7.7)
NEUTROPHILS NFR BLD: 73.9 % (ref 38–73)
NRBC BLD-RTO: 1 /100 WBC
PHOSPHATE SERPL-MCNC: 2.8 MG/DL (ref 2.7–4.5)
PLATELET # BLD AUTO: 168 K/UL (ref 150–450)
PMV BLD AUTO: 9.9 FL (ref 9.2–12.9)
POCT GLUCOSE: 220 MG/DL (ref 70–110)
POCT GLUCOSE: 229 MG/DL (ref 70–110)
POCT GLUCOSE: 309 MG/DL (ref 70–110)
POCT GLUCOSE: 313 MG/DL (ref 70–110)
POTASSIUM SERPL-SCNC: 4.2 MMOL/L (ref 3.5–5.1)
PROT SERPL-MCNC: 6.4 G/DL (ref 6–8.4)
RBC # BLD AUTO: 3.92 M/UL (ref 4–5.4)
SODIUM SERPL-SCNC: 130 MMOL/L (ref 136–145)
WBC # BLD AUTO: 9.25 K/UL (ref 3.9–12.7)

## 2022-10-30 PROCEDURE — 84165 PROTEIN E-PHORESIS SERUM: CPT | Mod: 26,,, | Performed by: PATHOLOGY

## 2022-10-30 PROCEDURE — 93010 EKG 12-LEAD: ICD-10-PCS | Mod: ,,, | Performed by: INTERNAL MEDICINE

## 2022-10-30 PROCEDURE — 84165 PATHOLOGIST INTERPRETATION SPE: ICD-10-PCS | Mod: 26,,, | Performed by: PATHOLOGY

## 2022-10-30 PROCEDURE — 99233 PR SUBSEQUENT HOSPITAL CARE,LEVL III: ICD-10-PCS | Mod: ,,, | Performed by: STUDENT IN AN ORGANIZED HEALTH CARE EDUCATION/TRAINING PROGRAM

## 2022-10-30 PROCEDURE — 80053 COMPREHEN METABOLIC PANEL: CPT | Performed by: STUDENT IN AN ORGANIZED HEALTH CARE EDUCATION/TRAINING PROGRAM

## 2022-10-30 PROCEDURE — 85025 COMPLETE CBC W/AUTO DIFF WBC: CPT | Performed by: STUDENT IN AN ORGANIZED HEALTH CARE EDUCATION/TRAINING PROGRAM

## 2022-10-30 PROCEDURE — 84165 PROTEIN E-PHORESIS SERUM: CPT | Performed by: STUDENT IN AN ORGANIZED HEALTH CARE EDUCATION/TRAINING PROGRAM

## 2022-10-30 PROCEDURE — 99233 SBSQ HOSP IP/OBS HIGH 50: CPT | Mod: ,,, | Performed by: STUDENT IN AN ORGANIZED HEALTH CARE EDUCATION/TRAINING PROGRAM

## 2022-10-30 PROCEDURE — 93010 ELECTROCARDIOGRAM REPORT: CPT | Mod: ,,, | Performed by: INTERNAL MEDICINE

## 2022-10-30 PROCEDURE — 12000002 HC ACUTE/MED SURGE SEMI-PRIVATE ROOM

## 2022-10-30 PROCEDURE — 25000003 PHARM REV CODE 250: Performed by: HOSPITALIST

## 2022-10-30 PROCEDURE — 86334 PATHOLOGIST INTERPRETATION IFE: ICD-10-PCS | Mod: 26,,, | Performed by: PATHOLOGY

## 2022-10-30 PROCEDURE — 63600175 PHARM REV CODE 636 W HCPCS: Performed by: STUDENT IN AN ORGANIZED HEALTH CARE EDUCATION/TRAINING PROGRAM

## 2022-10-30 PROCEDURE — 83735 ASSAY OF MAGNESIUM: CPT | Performed by: STUDENT IN AN ORGANIZED HEALTH CARE EDUCATION/TRAINING PROGRAM

## 2022-10-30 PROCEDURE — 93005 ELECTROCARDIOGRAM TRACING: CPT

## 2022-10-30 PROCEDURE — 86334 IMMUNOFIX E-PHORESIS SERUM: CPT | Mod: 26,,, | Performed by: PATHOLOGY

## 2022-10-30 PROCEDURE — 84100 ASSAY OF PHOSPHORUS: CPT | Performed by: STUDENT IN AN ORGANIZED HEALTH CARE EDUCATION/TRAINING PROGRAM

## 2022-10-30 PROCEDURE — 63600175 PHARM REV CODE 636 W HCPCS: Performed by: HOSPITALIST

## 2022-10-30 PROCEDURE — 25000003 PHARM REV CODE 250: Performed by: STUDENT IN AN ORGANIZED HEALTH CARE EDUCATION/TRAINING PROGRAM

## 2022-10-30 PROCEDURE — 36415 COLL VENOUS BLD VENIPUNCTURE: CPT | Performed by: STUDENT IN AN ORGANIZED HEALTH CARE EDUCATION/TRAINING PROGRAM

## 2022-10-30 PROCEDURE — 86334 IMMUNOFIX E-PHORESIS SERUM: CPT | Performed by: STUDENT IN AN ORGANIZED HEALTH CARE EDUCATION/TRAINING PROGRAM

## 2022-10-30 RX ORDER — INSULIN ASPART 100 [IU]/ML
0-5 INJECTION, SOLUTION INTRAVENOUS; SUBCUTANEOUS
Status: DISCONTINUED | OUTPATIENT
Start: 2022-10-30 | End: 2022-10-31

## 2022-10-30 RX ORDER — ONDANSETRON 2 MG/ML
4 INJECTION INTRAMUSCULAR; INTRAVENOUS 3 TIMES DAILY
Status: DISCONTINUED | OUTPATIENT
Start: 2022-10-30 | End: 2022-11-01 | Stop reason: HOSPADM

## 2022-10-30 RX ORDER — GABAPENTIN 100 MG/1
100 CAPSULE ORAL 3 TIMES DAILY
Status: DISCONTINUED | OUTPATIENT
Start: 2022-10-30 | End: 2022-11-01 | Stop reason: HOSPADM

## 2022-10-30 RX ORDER — INSULIN ASPART 100 [IU]/ML
3 INJECTION, SOLUTION INTRAVENOUS; SUBCUTANEOUS
Status: DISCONTINUED | OUTPATIENT
Start: 2022-10-30 | End: 2022-10-31

## 2022-10-30 RX ORDER — HYDROMORPHONE HYDROCHLORIDE 1 MG/ML
0.5 INJECTION, SOLUTION INTRAMUSCULAR; INTRAVENOUS; SUBCUTANEOUS EVERY 6 HOURS PRN
Status: DISCONTINUED | OUTPATIENT
Start: 2022-10-30 | End: 2022-10-31

## 2022-10-30 RX ORDER — ACETAMINOPHEN 500 MG
1000 TABLET ORAL DAILY
Status: DISCONTINUED | OUTPATIENT
Start: 2022-10-30 | End: 2022-10-31

## 2022-10-30 RX ORDER — HYDROMORPHONE HYDROCHLORIDE 1 MG/ML
0.5 INJECTION, SOLUTION INTRAMUSCULAR; INTRAVENOUS; SUBCUTANEOUS EVERY 4 HOURS PRN
Status: DISCONTINUED | OUTPATIENT
Start: 2022-10-30 | End: 2022-10-30

## 2022-10-30 RX ORDER — GABAPENTIN 300 MG/1
300 CAPSULE ORAL NIGHTLY
Status: DISCONTINUED | OUTPATIENT
Start: 2022-10-30 | End: 2022-10-30

## 2022-10-30 RX ADMIN — APIXABAN 5 MG: 5 TABLET, FILM COATED ORAL at 10:10

## 2022-10-30 RX ADMIN — ONDANSETRON 4 MG: 2 INJECTION INTRAMUSCULAR; INTRAVENOUS at 03:10

## 2022-10-30 RX ADMIN — INSULIN ASPART 4 UNITS: 100 INJECTION, SOLUTION INTRAVENOUS; SUBCUTANEOUS at 06:10

## 2022-10-30 RX ADMIN — INSULIN DETEMIR 10 UNITS: 100 INJECTION, SOLUTION SUBCUTANEOUS at 09:10

## 2022-10-30 RX ADMIN — METOPROLOL SUCCINATE 50 MG: 25 TABLET, EXTENDED RELEASE ORAL at 10:10

## 2022-10-30 RX ADMIN — ACETAMINOPHEN 1000 MG: 500 TABLET ORAL at 10:10

## 2022-10-30 RX ADMIN — HYDROMORPHONE HYDROCHLORIDE 0.5 MG: 1 INJECTION, SOLUTION INTRAMUSCULAR; INTRAVENOUS; SUBCUTANEOUS at 03:10

## 2022-10-30 RX ADMIN — GABAPENTIN 100 MG: 100 CAPSULE ORAL at 03:10

## 2022-10-30 RX ADMIN — AMLODIPINE BESYLATE 10 MG: 10 TABLET ORAL at 10:10

## 2022-10-30 RX ADMIN — LEVETIRACETAM 500 MG: 500 TABLET, FILM COATED ORAL at 09:10

## 2022-10-30 RX ADMIN — LEVETIRACETAM 500 MG: 500 TABLET, FILM COATED ORAL at 10:10

## 2022-10-30 RX ADMIN — GABAPENTIN 100 MG: 100 CAPSULE ORAL at 11:10

## 2022-10-30 RX ADMIN — GABAPENTIN 100 MG: 100 CAPSULE ORAL at 09:10

## 2022-10-30 RX ADMIN — ONDANSETRON 4 MG: 2 INJECTION INTRAMUSCULAR; INTRAVENOUS at 09:10

## 2022-10-30 RX ADMIN — INSULIN ASPART 4 UNITS: 100 INJECTION, SOLUTION INTRAVENOUS; SUBCUTANEOUS at 09:10

## 2022-10-30 RX ADMIN — PANTOPRAZOLE SODIUM 40 MG: 40 TABLET, DELAYED RELEASE ORAL at 05:10

## 2022-10-30 RX ADMIN — HYDROMORPHONE HYDROCHLORIDE 0.5 MG: 1 INJECTION, SOLUTION INTRAMUSCULAR; INTRAVENOUS; SUBCUTANEOUS at 10:10

## 2022-10-30 RX ADMIN — ERYTHROMYCIN LACTOBIONATE 250 MG: 500 INJECTION, POWDER, LYOPHILIZED, FOR SOLUTION INTRAVENOUS at 10:10

## 2022-10-30 RX ADMIN — HYDROCODONE BITARTRATE AND ACETAMINOPHEN 1 TABLET: 5; 325 TABLET ORAL at 05:10

## 2022-10-30 RX ADMIN — INSULIN ASPART 3 UNITS: 100 INJECTION, SOLUTION INTRAVENOUS; SUBCUTANEOUS at 06:10

## 2022-10-30 RX ADMIN — ERYTHROMYCIN LACTOBIONATE 250 MG: 500 INJECTION, POWDER, LYOPHILIZED, FOR SOLUTION INTRAVENOUS at 03:10

## 2022-10-30 RX ADMIN — APIXABAN 5 MG: 5 TABLET, FILM COATED ORAL at 09:10

## 2022-10-30 NOTE — SUBJECTIVE & OBJECTIVE
Interval History: Patient is moaning and rocking in pain, complains of back and stomach pain and some nausea. States she cannot see anything except light with R eye, blurry vision in L eye.    Review of Systems   Constitutional:  Negative for chills and fever.   HENT:  Negative for rhinorrhea and sore throat.    Eyes:  Positive for visual disturbance.   Respiratory:  Negative for cough and shortness of breath.    Cardiovascular:  Negative for chest pain and leg swelling.   Gastrointestinal:  Positive for abdominal pain and nausea. Negative for diarrhea and vomiting.   Genitourinary:  Negative for dysuria.   Musculoskeletal:  Positive for back pain.   Skin:  Negative for rash.   Neurological:  Negative for light-headedness and headaches.   Psychiatric/Behavioral:  Negative for confusion.    Objective:     Vital Signs (Most Recent):  Temp: 97.1 °F (36.2 °C) (10/30/22 1131)  Pulse: 85 (10/30/22 1131)  Resp: 18 (10/30/22 1131)  BP: (!) 95/55 (10/30/22 1131)  SpO2: 97 % (10/30/22 1131)   Vital Signs (24h Range):  Temp:  [97.1 °F (36.2 °C)-98.5 °F (36.9 °C)] 97.1 °F (36.2 °C)  Pulse:  [73-86] 85  Resp:  [15-20] 18  SpO2:  [93 %-99 %] 97 %  BP: ()/(52-89) 95/55     Weight: 59 kg (130 lb 1.1 oz)  Body mass index is 23.79 kg/m².    Intake/Output Summary (Last 24 hours) at 10/30/2022 1157  Last data filed at 10/30/2022 0440  Gross per 24 hour   Intake 480 ml   Output 1 ml   Net 479 ml      Physical Exam  HENT:      Head: Normocephalic and atraumatic.      Right Ear: External ear normal.      Left Ear: External ear normal.      Nose: Nose normal.      Mouth/Throat:      Mouth: Mucous membranes are moist.      Pharynx: Oropharynx is clear.   Eyes:      General: No scleral icterus.     Extraocular Movements: Extraocular movements intact.      Conjunctiva/sclera: Conjunctivae normal.   Cardiovascular:      Rate and Rhythm: Normal rate and regular rhythm.   Pulmonary:      Effort: Pulmonary effort is normal.      Breath  sounds: Normal breath sounds. No wheezing or rales.   Abdominal:      General: Abdomen is flat. Bowel sounds are normal. There is no distension.      Palpations: Abdomen is soft.      Tenderness: There is no abdominal tenderness.   Musculoskeletal:         General: Normal range of motion.      Cervical back: Normal range of motion.   Skin:     General: Skin is warm and dry.   Neurological:      General: No focal deficit present.      Mental Status: She is alert.   Psychiatric:         Mood and Affect: Mood normal.         Behavior: Behavior normal.       Significant Labs: All pertinent labs within the past 24 hours have been reviewed.    Significant Imaging: I have reviewed all pertinent imaging results/findings within the past 24 hours.

## 2022-10-30 NOTE — ASSESSMENT & PLAN NOTE
-s/p MRI brain and CTA head/neck w/o acute abnormality  -transferred for Ophthalmology consultation - recommending retina service eval on Monday 10/31, per note visual changes likely due to proliferative retinopathy in setting of ischemic retinopathy of unknown origin seen on exam 2/2022.   [place] [place]x [place] ischemic retinopathy right eye worse than left.

## 2022-10-30 NOTE — ASSESSMENT & PLAN NOTE
-CT notes retained stomach contents  -prior notes indicate c/o of nausea/vomiting - continue PRN zofran/phenergn  -on no Motility agents such as Reglan or Erythromycin, has consistently been receiving opiates, likely will need opiates weaned,   -Reglan - receive pharmacy contraindication with hx of seizure disorder.  Will scheduled Erythryomycin for gastroparesis flare

## 2022-10-30 NOTE — ASSESSMENT & PLAN NOTE
S/p new ECHO and evaluated by Cardiology Dr. Hart @ [facility],   -effusion is hemodynamically INSIGNIFICANT, unchanged in size at this time   -there is concern for possible infiltrative Cardiomyopathy thought

## 2022-10-30 NOTE — ASSESSMENT & PLAN NOTE
S/p new ECHO and evaluated by Cardiology Dr. Hart @ [facility],   -effusion is hemodynamically INSIGNIFICANT, unchanged in size at this time   -there is concern for possible infiltrative Cardiomyopathy thought

## 2022-10-30 NOTE — PLAN OF CARE
Problem: Adult Inpatient Plan of Care  Goal: Plan of Care Review  Outcome: Ongoing, Progressing  Goal: Patient-Specific Goal (Individualized)  Outcome: Ongoing, Progressing  Goal: Absence of Hospital-Acquired Illness or Injury  Outcome: Ongoing, Progressing  Goal: Optimal Comfort and Wellbeing  Outcome: Ongoing, Progressing  Goal: Readiness for Transition of Care  Outcome: Ongoing, Progressing     Problem: Diabetes Comorbidity  Goal: Blood Glucose Level Within Targeted Range  Outcome: Ongoing, Progressing     Patient AAOX3. Cooperative with care. No acute events over night. Pain management.

## 2022-10-30 NOTE — PROGRESS NOTES
[facility] - Intensive Care ([address] Medicine  Progress Note    Patient Name: Tabby Howard  MRN: [id number]  Patient Class: IP- Inpatient   Admission Date: 10/29/2022  Length of Stay: 1 days  Attending Physician: Frantz Rodríguez MD  Primary Care Provider: Primary Doctor No        Subjective:     Principal Problem:Vision loss, right eye        HPI:  32-year-old female with a history of end-stage renal disease on hemodialysis, diabetes, heart failure, gastroesophageal reflux disease, sickle cell trait, recent C diff, and hypertension admitted to [facility] on October 26 with chest discomfort, dyspnea, and blood tinged sputum.  She has chronic diffuse abdominal and back pain.  She had no fever or chills and no diarrhea.  She was admitted with volume overload, chest pain, and hyponatremia.  She underwent hemodialysis, and she received packed red blood cells.  Troponin was elevated during her stay.  Imaging studies were consistent with, but not diagnostic of, amyloid.  On October 29, she told the provider that she had several days of right eye vision loss.  She was not able to see shapes and everything appeared black.  She was able to see light shined in her eye.  She also noted unusual shaves consistent with floaters in the left eye.  Referring provider spoke with Ophthalmology at [facility].  Requesting transfer to Hospital Medicine at [facility] for further evaluation for vision changes.  With the persistent pain, along with vision changes and abnormal echocardiogram, and more extensive evaluation of her presenting findings may be needed.     October 29:  White blood cells 10.62, hemoglobin 10.1, hematocrit 28.8, platelets 135, sodium 131, potassium 3.2, chloride 98, CO2 25, BUN 39, creatinine 4.2, glucose 139     October 28:  White blood cells 13.35, hemoglobin 7, hematocrit 20.1, platelets 100, BNP greater than 4500  Immunoglobulin light chains, serum protein electrophoresis,  and beta 2 microglobulin pending  -echocardiogram had EF 34%, LV diastolic dysfunction.  Strain study compatible with amyloid but not diagnostic of amyloid.     October 27: Troponin 0.159 (repeat 0.250)  -Echocardiogram had ejection fraction 55%, abnormal septal wall motion.  Mild-to-moderate TR.  Moderate circumferential pericardial effusion.  Mild pulmonary hypertension.     October 26: Quantitative hCG 1.9, COVID negative, influenza negative  -chest x-ray showed bilateral perihilar interstitial lung opacities with enlarged cardiomediastinal silhouette.  -CT of the abdomen and pelvis noted opacities at both lung bases suggesting volume overload and/or interstitial pulmonary edema with superimposed multifocal viral or atypical pneumonia or nonspecific pneumonitis not excluded.  Small right pleural effusion and moderate-sized pericardial effusion.  Trace ascites and anasarca.       Bedside EVAL  -Patient is in stretcher, moaning on entry to room, she reports having ongoing back pain and abdominal discomfort, eyes closed in a lateral decubitus position.   On discussion of her vision complaints, she states a few days ago vision from right eye became blurred and then states suddenly she cannot see out of the right eye.  She denies any pain in the right eye.   Reports blurred or decreased vision in the right eye is worse than left.     On review of care everywhere [place] [facility] [place], last note Feb 2022 pt with diagnosis of Ichsemic retinopathy Right >Left, plan in note to optimize her chronic conditions of diabetes-glycemic control, anti-hypertensive management, and w/u of her CKD.   There are subsequent notes that patient missed f/u in March/April/may   On questioning re; state of her vision prior to hospitalization or if she had chronic visual deficits, patient states no.     Visual acuity in Feb 2022   Right eye 20/200 and Left Eye 20/70.    Patient has no corrective lenses on during  examination      Overview/Hospital Course:  Evaluated by ophthalmology, recommending retina service evaluation.      Interval History: Patient is moaning and rocking in pain, complains of back and stomach pain and some nausea. States she cannot see anything except light with R eye, blurry vision in L eye.    Review of Systems   Constitutional:  Negative for chills and fever.   HENT:  Negative for rhinorrhea and sore throat.    Eyes:  Positive for visual disturbance.   Respiratory:  Negative for cough and shortness of breath.    Cardiovascular:  Negative for chest pain and leg swelling.   Gastrointestinal:  Positive for abdominal pain and nausea. Negative for diarrhea and vomiting.   Genitourinary:  Negative for dysuria.   Musculoskeletal:  Positive for back pain.   Skin:  Negative for rash.   Neurological:  Negative for light-headedness and headaches.   Psychiatric/Behavioral:  Negative for confusion.    Objective:     Vital Signs (Most Recent):  Temp: 97.1 °F (36.2 °C) (10/30/22 1131)  Pulse: 85 (10/30/22 1131)  Resp: 18 (10/30/22 1131)  BP: (!) 95/55 (10/30/22 1131)  SpO2: 97 % (10/30/22 1131)   Vital Signs (24h Range):  Temp:  [97.1 °F (36.2 °C)-98.5 °F (36.9 °C)] 97.1 °F (36.2 °C)  Pulse:  [73-86] 85  Resp:  [15-20] 18  SpO2:  [93 %-99 %] 97 %  BP: ()/(52-89) 95/55     Weight: 59 kg (130 lb 1.1 oz)  Body mass index is 23.79 kg/m².    Intake/Output Summary (Last 24 hours) at 10/30/2022 1157  Last data filed at 10/30/2022 0440  Gross per 24 hour   Intake 480 ml   Output 1 ml   Net 479 ml      Physical Exam  HENT:      Head: Normocephalic and atraumatic.      Right Ear: External ear normal.      Left Ear: External ear normal.      Nose: Nose normal.      Mouth/Throat:      Mouth: Mucous membranes are moist.      Pharynx: Oropharynx is clear.   Eyes:      General: No scleral icterus.     Extraocular Movements: Extraocular movements intact.      Conjunctiva/sclera: Conjunctivae normal.   Cardiovascular:       Rate and Rhythm: Normal rate and regular rhythm.   Pulmonary:      Effort: Pulmonary effort is normal.      Breath sounds: Normal breath sounds. No wheezing or rales.   Abdominal:      General: Abdomen is flat. Bowel sounds are normal. There is no distension.      Palpations: Abdomen is soft.      Tenderness: There is no abdominal tenderness.   Musculoskeletal:         General: Normal range of motion.      Cervical back: Normal range of motion.   Skin:     General: Skin is warm and dry.   Neurological:      General: No focal deficit present.      Mental Status: She is alert.   Psychiatric:         Mood and Affect: Mood normal.         Behavior: Behavior normal.       Significant Labs: All pertinent labs within the past 24 hours have been reviewed.    Significant Imaging: I have reviewed all pertinent imaging results/findings within the past 24 hours.      Assessment/Plan:      * Vision loss, right eye  -s/p MRI brain and CTA head/neck w/o acute abnormality  -transferred for Ophthalmology consultation - recommending retina service eval on Monday 10/31, per note visual changes likely due to proliferative retinopathy in setting of ischemic retinopathy of unknown origin seen on exam 2/2022.   -[facility] [place] hx of chronic ischemic retinopathy right eye worse than left.         Chronic abdominal pain  S/p CTA Chest abd/pelvis, no acute findings to explain abdominal pain  -concern for gastroparesis - continue erythromycin   -prior hospital med notes comment on distractibility on exam and concern for pain seeking behavior  -continue prior PRN medications patient was on PRN Norco and PRN IV dilaudid for severe pain, taper as tolerated.       Anemia, acute on chronic  -required 2 PRBC transfusions @ [facility], on 10/26, 10/28 - Hgb improved to 11 today  -Nephrology was ordering EPO for patient     Volume overload with pulmonary edema  -received daily HD treatments on 10/27-28-29 prior to transfer.   -lungs CTA and on  room air, lying supine in bed with normal work of breathing.       Primary hypertension  -Re-started home amlodipine 10, hydralazine 100 q8, Toprol 50 BID  -hydralazine 5mg IV PRN for SBP>185      ESRD (end stage renal disease)  Nephrology consultation to continue HD  -her outpatient schedule is Tu/Th/Sa.       Pericardial effusion  S/p new ECHO and evaluated by Cardiology Dr. Hart @ [facility],   -effusion is hemodynamically INSIGNIFICANT, unchanged in size at this time   -there is concern for possible infiltrative Cardiomyopathy thought      Sickle cell trait        Gastroparesis  -CT notes retained stomach contents  -prior notes indicate c/o of nausea/vomiting - continue PRN zofran/phenergn  -on no Motility agents such as Reglan or Erythromycin, has consistently been receiving opiates, likely will need opiates weaned,   -Reglan - receive pharmacy contraindication with hx of seizure disorder.  Will scheduled Erythryomycin for gastroparesis flare       Type 2 diabetes mellitus with chronic kidney disease on chronic dialysis, with long-term current use of insulin  Patient's FSGs are controlled on current medication regimen.  Last A1c reviewed-   Lab Results   Component Value Date    HGBA1C 6.2 08/24/2022     Most recent fingerstick glucose reviewed-   Recent Labs   Lab 10/29/22  2041 10/30/22  0743 10/30/22  1137   POCTGLUCOSE 336* 220* 229*     Current correctional scale  Low  Maintain anti-hyperglycemic dose as follows-   Antihyperglycemics (From admission, onward)    Start     Stop Route Frequency Ordered    10/30/22 1130  insulin aspart U-100 pen 3 Units         -- SubQ 3 times daily with meals 10/30/22 0923    10/30/22 1024  insulin aspart U-100 pen 0-5 Units         -- SubQ Before meals & nightly PRN 10/30/22 0924    10/29/22 2100  insulin detemir U-100 pen 10 Units         -- SubQ Nightly 10/29/22 1930        Hold Oral hypoglycemics while patient is in the hospital.      VTE Risk Mitigation (From  admission, onward)         Ordered     apixaban tablet 5 mg  2 times daily         10/29/22 1930     IP VTE HIGH RISK PATIENT  Once         10/29/22 1930     Place sequential compression device  Until discontinued         10/29/22 1930     Reason for No Pharmacological VTE Prophylaxis  Once        Question:  Reasons:  Answer:  Already adequately anticoagulated on oral Anticoagulants    10/29/22 1930     Place sequential compression device  Until discontinued         10/29/22 1930                Discharge Planning   TATIANA: 10/31/2022     Code Status: Full Code   Is the patient medically ready for discharge?: No    Reason for patient still in hospital (select all that apply): Patient trending condition, Treatment and Consult recommendations                     MICHELLE MAC MD  Department of Hospital Medicine   [facility] - Intensive Care ([place]-[facility])

## 2022-10-30 NOTE — ASSESSMENT & PLAN NOTE
Patient's FSGs are controlled on current medication regimen.  Last A1c reviewed-   Lab Results   Component Value Date    HGBA1C 6.2 08/24/2022     Most recent fingerstick glucose reviewed-   Recent Labs   Lab 10/29/22  2041 10/30/22  0743 10/30/22  1137   POCTGLUCOSE 336* 220* 229*     Current correctional scale  Low  Maintain anti-hyperglycemic dose as follows-   Antihyperglycemics (From admission, onward)    Start     Stop Route Frequency Ordered    10/30/22 1130  insulin aspart U-100 pen 3 Units         -- SubQ 3 times daily with meals 10/30/22 0923    10/30/22 1024  insulin aspart U-100 pen 0-5 Units         -- SubQ Before meals & nightly PRN 10/30/22 0924    10/29/22 2100  insulin detemir U-100 pen 10 Units         -- SubQ Nightly 10/29/22 1930        Hold Oral hypoglycemics while patient is in the hospital.

## 2022-10-30 NOTE — ASSESSMENT & PLAN NOTE
Patient's FSGs are controlled on current medication regimen.  Last A1c reviewed-   Lab Results   Component Value Date    HGBA1C 6.2 08/24/2022     Most recent fingerstick glucose reviewed- No results for input(s): POCTGLUCOSE in the last 24 hours.  Current correctional scale  Low  Maintain anti-hyperglycemic dose as follows-   Antihyperglycemics (From admission, onward)    Start     Stop Route Frequency Ordered    10/29/22 2100  insulin detemir U-100 pen 10 Units         -- SubQ Nightly 10/29/22 1930    10/29/22 1928  insulin aspart U-100 pen 1-10 Units         -- SubQ Before meals & nightly PRN 10/29/22 1930        Hold Oral hypoglycemics while patient is in the hospital.

## 2022-10-30 NOTE — ASSESSMENT & PLAN NOTE
-CT notes retained stomach contents  -prior notes indicate c/o of nausea/vomiting - continue PRN zofran/phenergn  -on no Motility agents such as Reglan or Erythromycin, has consistently been receiving opiates, likely will need opiates weaned,  Will change PRN anti-emetics to include reglan

## 2022-10-30 NOTE — SUBJECTIVE & OBJECTIVE
Past Medical History:   Diagnosis Date    CKD (chronic kidney disease), stage IV 2022    Diabetes mellitus due to underlying condition with unspecified complications 2022    Gastroparesis 2022    Heart failure with preserved ejection fraction 2022    EF 55% on 3/22    History of gastroesophageal reflux (GERD)     History of supraventricular tachycardia     Hyperkalemia 2022    Hypertensive emergency 2022    Sickle cell trait 2022    Type 2 diabetes mellitus        Past Surgical History:   Procedure Laterality Date     SECTION      x 3    COLONOSCOPY      COLONOSCOPY N/A 2022    Procedure: COLONOSCOPY;  Surgeon: Jagdeep Cedeno MD;  Location: [facility];  Service: Endoscopy;  Laterality: N/A;    ESOPHAGOGASTRODUODENOSCOPY N/A 10/18/2019    Procedure: ESOPHAGOGASTRODUODENOSCOPY (EGD);  Surgeon: Gianluca Mendez MD;  Location: [facility];  Service: Endoscopy;  Laterality: N/A;    ESOPHAGOGASTRODUODENOSCOPY N/A 2022    Procedure: EGD (ESOPHAGOGASTRODUODENOSCOPY);  Surgeon: Micky Paredes III, MD;  Location: [facility];  Service: Endoscopy;  Laterality: N/A;    LAPAROSCOPIC CHOLECYSTECTOMY N/A 2022    Procedure: CHOLECYSTECTOMY, LAPAROSCOPIC;  Surgeon: Grey Perez MD;  Location: [facility];  Service: General;  Laterality: N/A;    PLACEMENT OF DUAL-LUMEN VASCULAR CATHETER Left 2022    Procedure: INSERTION-CATHETER-JOSEPH;  Surgeon: Dionte Gan MD;  Location: [facility] OR;  Service: General;  Laterality: Left;    PLACEMENT OF DUAL-LUMEN VASCULAR CATHETER Right 2022    Procedure: INSERTION-CATHETER-Hemosplit;  Surgeon: Dionte Gan MD;  Location: [facility] OR;  Service: General;  Laterality: Right;       Review of patient's allergies indicates:   Allergen Reactions    Penicillins Hives       Current Facility-Administered Medications on File Prior to Encounter   Medication    [COMPLETED] ALPRAZolam tablet 0.5 mg    [COMPLETED] iodixanoL (VISIPAQUE 320)  injection 100 mL    [DISCONTINUED] 0.9%  NaCl infusion (for blood administration)    [DISCONTINUED] acetaminophen tablet 650 mg    [DISCONTINUED] ALPRAZolam tablet 0.5 mg    [DISCONTINUED] amLODIPine tablet 10 mg    [DISCONTINUED] apixaban tablet 5 mg    [DISCONTINUED] dextrose 10% bolus 125 mL    [DISCONTINUED] dextrose 10% bolus 250 mL    [DISCONTINUED] epoetin teresa-epbx injection 8,900 Units    [DISCONTINUED] glucagon (human recombinant) injection 1 mg    [DISCONTINUED] glucose chewable tablet 16 g    [DISCONTINUED] glucose chewable tablet 24 g    [DISCONTINUED] hydrALAZINE injection 5 mg    [DISCONTINUED] hydrALAZINE tablet 100 mg    [DISCONTINUED] HYDROcodone-acetaminophen 5-325 mg per tablet 1 tablet    [DISCONTINUED] HYDROmorphone injection 0.5 mg    [DISCONTINUED] insulin aspart U-100 pen 1-10 Units    [DISCONTINUED] insulin aspart U-100 pen 5 Units    [DISCONTINUED] insulin detemir U-100 pen 10 Units    [DISCONTINUED] melatonin tablet 6 mg    [DISCONTINUED] metoprolol succinate (TOPROL-XL) 24 hr tablet 50 mg    [DISCONTINUED] mupirocin 2 % ointment    [DISCONTINUED] naloxone 0.4 mg/mL injection 0.02 mg    [DISCONTINUED] ondansetron injection 4 mg    [DISCONTINUED] pantoprazole EC tablet 40 mg    [DISCONTINUED] promethazine (PHENERGAN) 25 mg in dextrose 5 % 50 mL IVPB    [DISCONTINUED] sodium chloride 0.9% flush 10 mL     Current Outpatient Medications on File Prior to Encounter   Medication Sig    albuterol (PROVENTIL/VENTOLIN HFA) 90 mcg/actuation inhaler Inhale 2 puffs into the lungs every 6 (six) hours as needed for Wheezing. Rescue    amLODIPine (NORVASC) 10 MG tablet Take 1 tablet (10 mg total) by mouth once daily.    apixaban (ELIQUIS) 5 mg Tab Take 1 tablet (5 mg total) by mouth 2 (two) times daily. For second month on.    carvediloL (COREG) 25 MG tablet Take 1 tablet (25 mg total) by mouth 2 (two) times daily.    cloNIDine 0.2 mg/24 hr td ptwk (CATAPRES) 0.2 mg/24 hr Place 1 patch onto the skin  every 7 days.    famotidine (PEPCID) 20 MG tablet Take 1 tablet (20 mg total) by mouth once daily.    FLUoxetine 20 MG capsule Take 1 capsule (20 mg total) by mouth once daily.    furosemide (LASIX) 40 MG tablet Take 1 tablet (40 mg total) by mouth 2 (two) times daily.    hydrALAZINE (APRESOLINE) 100 MG tablet Take 1 tablet (100 mg total) by mouth every 8 (eight) hours.    insulin aspart U-100 (NOVOLOG) 100 unit/mL (3 mL) InPn pen Inject 1-10 Units into the skin before meals and at bedtime as needed (Hyperglycemia).    isosorbide mononitrate (IMDUR) 60 MG 24 hr tablet Take 2 tablets (120 mg total) by mouth once daily.    LANTUS U-100 INSULIN 100 unit/mL injection Inject 5 Units into the skin once daily.    levETIRAcetam (KEPPRA) 500 MG Tab Take 1 tablet (500 mg total) by mouth 2 (two) times daily.    metoclopramide HCl (REGLAN) 10 MG tablet Take 1 tablet (10 mg total) by mouth every 6 (six) hours.    ondansetron (ZOFRAN-ODT) 4 MG TbDL Take 1 tablet (4 mg total) by mouth every 8 (eight) hours as needed (nausea, vomiting).    pantoprazole (PROTONIX) 40 MG tablet Take 1 tablet (40 mg total) by mouth once daily.    vancomycin (VANCOCIN) 125 MG capsule TAKE 1 CAPSULE BY MOUTH FOUR TIMES DAILY FOR 10 DAYS (Patient taking differently: Take 125 mg by mouth 4 (four) times daily.)    [DISCONTINUED] atenoloL (TENORMIN) 50 MG tablet Take 1 tablet (50 mg total) by mouth every other day.    [DISCONTINUED] omeprazole (PRILOSEC) 20 MG capsule Take 2 capsules (40 mg total) by mouth once daily. for 10 days    [DISCONTINUED] torsemide (DEMADEX) 20 MG Tab Take 1 tablet (20 mg total) by mouth 2 (two) times a day. (Patient taking differently: Take 20 mg by mouth once daily.)     Family History       Problem Relation (Age of Onset)    Diabetes Mother, Father          Tobacco Use    Smoking status: Never    Smokeless tobacco: Never   Substance and Sexual Activity    Alcohol use: Not Currently    Drug use: No    Sexual activity: Not  Currently     Partners: Male     Birth control/protection: I.U.D.     Review of Systems   HENT:  Negative for trouble swallowing.    Cardiovascular:  Negative for chest pain, palpitations and leg swelling.   Gastrointestinal:  Positive for abdominal pain and nausea. Negative for constipation and diarrhea.   Genitourinary:  Negative for dysuria.   Musculoskeletal:  Negative for back pain.   All other systems reviewed and are negative.  Objective:     Vital Signs (Most Recent):    Vital Signs (24h Range):  Temp:  [97.5 °F (36.4 °C)-98.7 °F (37.1 °C)] 98 °F (36.7 °C)  Pulse:  [78-86] 86  Resp:  [15-20] 18  SpO2:  [96 %-99 %] 98 %  BP: (111-162)/(63-89) 162/89     Weight: 59 kg (130 lb 1.1 oz)  Body mass index is 23.79 kg/m².    Physical Exam  Vitals and nursing note reviewed.   HENT:      Head: Normocephalic and atraumatic.   Eyes:      General: Lids are normal. Gaze aligned appropriately. No scleral icterus.        Right eye: No discharge.         Left eye: No discharge.      Extraocular Movements:      Right eye: Abnormal extraocular motion present.      Left eye: No nystagmus.      Conjunctiva/sclera: Conjunctivae normal.      Right eye: Right conjunctiva is not injected.      Left eye: Left conjunctiva is not injected.      Comments: Abnormal EOM unclear if this is effort related or if patient with right superior lateral gaze deficit   Neck:      Vascular: No JVD.   Cardiovascular:      Heart sounds: Normal heart sounds.   Pulmonary:      Effort: Pulmonary effort is normal.      Breath sounds: Normal breath sounds.   Abdominal:      General: Abdomen is flat. There is no distension.      Palpations: Abdomen is soft.      Tenderness: There is no abdominal tenderness. There is no guarding.      Comments: Hyperactive bowel sounds   Musculoskeletal:         General: Normal range of motion.      Right lower leg: No edema.      Left lower leg: No edema.   Skin:     General: Skin is warm and dry.   Neurological:       General: No focal deficit present.      Mental Status: She is alert.   Psychiatric:         Mood and Affect: Mood normal.           Significant Labs: All pertinent labs within the past 24 hours have been reviewed.  CBC:   Recent Labs   Lab 10/28/22  0811 10/29/22  0833   WBC 13.35* 10.62   HGB 7.0* 10.1*   HCT 20.1* 28.8*   * 135*     CMP:   Recent Labs   Lab 10/28/22  0811 10/29/22  0833    131*   K 3.4* 3.2*    98   CO2 29 25   * 139*   BUN 27* 39*   CREATININE 3.0* 4.2*   CALCIUM 8.8 8.4*   ANIONGAP 7* 8     Cardiac Markers:   Recent Labs   Lab 10/28/22  0811   BNP >4,500*     Coagulation: No results for input(s): PT, INR, APTT in the last 48 hours.  Lactic Acid:   Recent Labs   Lab 10/29/22  1431   LACTATE 0.7     Urine Studies: No results for input(s): COLORU, APPEARANCEUA, PHUR, SPECGRAV, PROTEINUA, GLUCUA, KETONESU, BILIRUBINUA, OCCULTUA, NITRITE, UROBILINOGEN, LEUKOCYTESUR, RBCUA, WBCUA, BACTERIA, SQUAMEPITHEL, HYALINECASTS in the last 48 hours.    Invalid input(s): HAY    Significant Imaging: I have reviewed all pertinent imaging results/findings within the past 24 hours.    CTA HEAD AND NECK     CLINICAL DATA: Visual disturbance     CMS MANDATED QUALITY DATA - CT RADIATION  436     All CT scans at this facility utilize dose modulation, iterative reconstruction, and/or weight based dosing when appropriate to reduce radiation dose to as low as reasonably achievable.     CMS MANDATED QUALITY DATA - CAROTID - 195     All measurements and percent stenosis described below were determined using NASCET criteria or criteria similar to NASCET, as defined by the Society of Radiologists in Ultrasound Consensus Conference, Radiology, 2003     CT angiography of the head and neck was performed. 100 mL nonionic contrast was administered. 3-D multiplanar reconstruction images were obtained and stored as part of the patient's permanent medical record.     CTA NECK:     The heart is enlarged.  The aortic arch is normal in appearance. Great vessel origins are unremarkable.     The bilateral common carotid and internal carotid arteries are normal in appearance, without plaque formation or luminal diameter narrowing. The bilateral vertebral arteries are patent with dominant left vertebral artery noted.     Incidentally noted are circumscribed hypodense nodules within the thyroid gland measuring up to 13 mm in size. These are similar in appearance compared to a prior CT study from July 26.        CTA BRAIN:     Intracranial portions of the internal carotid arteries are patent and within normal limits. The basilar artery is small on a developmental basis but otherwise normal.     The bilateral anterior, middle, and posterior cerebral arteries are normal, without major branch occlusion, high-grade stenosis, or aneurysm. Patent anterior communicating artery and posterior communicating arteries are noted.     IMPRESSION:  1. Normal CT appearance of the extracranial carotid systems.  2. Normal CTA brain.  3. Incidental observations as above.    Narrative & Impression  MRI brain without contrast     CLINICAL DATA: Headaches, visual disturbance     FINDINGS: Multiplanar noncontrast imaging was performed.     There is no intracranial mass, hemorrhage, or midline shift. Ventricles and sulci are within normal limits. There are no pathologic extra-axial fluid collections.     There is no evidence of ischemic change, edema, or demyelinating disease. The cerebellum and brainstem are unremarkable.     Bilateral mastoid effusions are noted. The orbits, skull base, and sella turcica are within normal limits. There is a trace amount of fluid in the left maxillary sinus.     IMPRESSION:  1. Normal MR appearance of the brain.  2. Mild bilateral mastoid effusions and trace amount of fluid in the left maxillary sinus.     Electronically signed by:  Zaid Wilson MD  10/29/2022 3:16 PM CDT Workstation: [id number]    CTA chest  abdomen and pelvis     CLINICAL DATA: Pain     CMS MANDATED QUALITY DATA - CT RADIATION  436     All CT scans at this facility utilize dose modulation, iterative reconstruction, and/or weight based dosing when appropriate to reduce radiation dose to as low as reasonably achievable.     Findings: CT angiography of the chest abdomen and pelvis was performed. 3-D multiplanar reconstruction images were obtained and stored as a part of the patient's permanent medical record.     Images of the chest are compared to July 26.     The heart is enlarged. There is a moderate-sized pericardial effusion, similar to the previous study. The thoracic aorta is normal in appearance without dissection. No pulmonary arterial filling defects are identified.     Images at lung windows demonstrate minor atelectasis in the left lower lobe. The lungs are otherwise clear. There are no pleural effusions.     No acute osseous abnormality is identified.     Images of the abdomen and pelvis are compared to October 26.     The liver is enlarged with diffuse hepatic steatosis. The gallbladder is absent. The biliary tree is nondilated. The spleen is borderline enlarged at 12.4 cm in length, unchanged. The pancreas and adrenal glands are unremarkable. Renal enhancement pattern is moderately heterogeneous. No mass or hydronephrosis is identified. Moderate left-sided renal cortical scarring is noted. The abdominal aorta is normal in caliber without evidence of aneurysm or dissection.     There is no pathologic bowel wall thickening or evidence of obstruction. There is a moderate amount of retained ingested food material within the stomach. No free air is identified.     Images of the pelvis demonstrate an unremarkable urinary bladder. Bowel structures are within normal limits. No significant free fluid is identified.     Mild diffuse body wall edema is noted. No acute osseous abnormality is demonstrated.     IMPRESSION:  1. Moderate-sized pericardial  effusion, similar in appearance to recent prior exams.  2. Diffuse body wall edema, also similar to prior studies.  3. Moderately heterogeneous enhancement pattern of both kidneys. This is nonspecific but could be seen in the setting of infection or acute or chronic renal cortical disease. Correlate with renal function studies and urinalysis. There is moderate left-sided renal cortical scarring.  4. Hepatosplenomegaly.  5. Additional findings as above.     Electronically signed by:  Zaid Wilson MD  10/29/2022 3:55 PM CDT Workstation: [id number]      Transthoracic echo (TTE) complete  Order# [id number]  Reading physician: Osbaldo Hart MD Ordering physician: Suzy Bobo MD Study date: 10/27/22     Reason for Exam  Priority: Routine  Dx: Pericardial effusion [I31.39 (ICD-10-CM)]   Comments: Limited echo, known history of moderate circumferential pericardial effusion, re-evaluate     Result Image Hyperlink     Show images for Echo  Summary       The left ventricle is normal in size with moderate moderate asymmetric hypertrophy eccentric hypertrophy and normal systolic function.  The estimated ejection fraction is 55%.  Indeterminate left ventricular diastolic function with normal left atrial pressure.  There are segmental left ventricular wall motion abnormalities.  Atrial fibrillation not observed.  Normal right ventricular size with normal right ventricular systolic function.  There is right ventricular hypertrophy.  There is abnormal septal wall motion. There is systolic flattening of the interventricular septum consistent with right ventricle pressure overload.  Mild to moderate tricuspid regurgitation.  Normal central venous pressure (3 mmHg).  The estimated PA systolic pressure is 35 mmHg.  Moderate circumferential pericardial effusion. Effusion is fluid.     1. Small to moderate pericardial effusion without any evidence of tamponade  No ventricular interdependence or right atrial right ventricle  collapse  2. Left ventricle hypertrophy with posterior wall thicker than septum  Septal hypokinesis noted  3. Pulmonary hypertension mild  4. Prominent eustachian ridge in the right atrium

## 2022-10-30 NOTE — ASSESSMENT & PLAN NOTE
S/p CTA Chest abd/pelvis, no acute findings to explain abdominal pain  -concern for gastroparesis - continue erythromycin   -prior hospital med notes comment on distractibility on exam and concern for pain seeking behavior  -continue prior PRN medications patient was on PRN Norco and PRN IV dilaudid for severe pain, taper as tolerated.

## 2022-10-30 NOTE — CONSULTS
Consultation Report  Ophthalmology Service    Date: 10/29/2022    Chief complaint/Reason for Consult: vision loss R eye      History of Present Illness: Tabby Howard is a 32 y.o. 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     Pt is somnolent and some confusion. States lost vision in her right eye suddenly 3 days ago, no flashes,floaters, curtains, spiderwebs, erythropsia in that eye. Also states has blurriness in L eye in same time frame with a few new floaters, no flashes, curtains.       POcularHx:     hx of ischemic retinopathy seen 2022 with vision loss both eyes, possible etiology due to bp/anemia, db,  or hypercoagulable state. Pt missed multiple follow ups for her eyes. Past vision 20/200 and 20/70 at that visit.     Current eye gtts: none      PMHx:  has a past medical history of CKD (chronic kidney disease), stage IV (2022), Diabetes mellitus due to underlying condition with unspecified complications (2022), Gastroparesis (2022), Heart failure with preserved ejection fraction (2022), History of gastroesophageal reflux (GERD), History of supraventricular tachycardia, Hyperkalemia (2022), Hypertensive emergency (2022), Sickle cell trait (2022), and Type 2 diabetes mellitus.     PSurgHx:  has a past surgical history that includes Esophagogastroduodenoscopy (N/A, 10/18/2019);  section; Placement of dual-lumen vascular catheter (Left, 2022); Placement of dual-lumen vascular catheter (Right, 2022); Laparoscopic cholecystectomy (N/A, 2022); Esophagogastroduodenoscopy (N/A, 2022); Colonoscopy; and Colonoscopy (N/A, 2022).     Home Medications:   Prior to Admission medications    Medication Sig Start Date End Date Taking? Authorizing Provider    albuterol (PROVENTIL/VENTOLIN HFA) 90 mcg/actuation inhaler Inhale 2 puffs into the lungs every 6 (six) hours as needed for Wheezing. Rescue    Historical Provider   amLODIPine (NORVASC) 10 MG tablet Take 1 tablet (10 mg total) by mouth once daily. 7/15/22 7/15/23  Keegan Cody MD   apixaban (ELIQUIS) 5 mg Tab Take 1 tablet (5 mg total) by mouth 2 (two) times daily. For second month on. 8/24/22 11/22/22  Qing Breen MD   carvediloL (COREG) 25 MG tablet Take 1 tablet (25 mg total) by mouth 2 (two) times daily. 7/27/22 10/25/22  Qing Breen MD   cloNIDine 0.2 mg/24 hr td ptwk (CATAPRES) 0.2 mg/24 hr Place 1 patch onto the skin every 7 days. 7/31/22 7/31/23  Qing Breen MD   famotidine (PEPCID) 20 MG tablet Take 1 tablet (20 mg total) by mouth once daily. 7/16/22 10/24/22  Keegan Cody MD   FLUoxetine 20 MG capsule Take 1 capsule (20 mg total) by mouth once daily. 6/8/22 6/8/23  Linda Cueto MD   furosemide (LASIX) 40 MG tablet Take 1 tablet (40 mg total) by mouth 2 (two) times daily. 7/27/22 7/27/23  Qing Breen MD   hydrALAZINE (APRESOLINE) 100 MG tablet Take 1 tablet (100 mg total) by mouth every 8 (eight) hours. 7/27/22 10/25/22  Qing Breen MD   insulin aspart U-100 (NOVOLOG) 100 unit/mL (3 mL) InPn pen Inject 1-10 Units into the skin before meals and at bedtime as needed (Hyperglycemia). 7/15/22   Keegan Cody MD   isosorbide mononitrate (IMDUR) 60 MG 24 hr tablet Take 2 tablets (120 mg total) by mouth once daily. 7/15/22 7/15/23  Keegan Cody MD   LANTUS U-100 INSULIN 100 unit/mL injection Inject 5 Units into the skin once daily. 5/25/22   Historical Provider   levETIRAcetam (KEPPRA) 500 MG Tab Take 1 tablet (500 mg total) by mouth 2 (two) times daily. 6/7/22 6/7/23  Linda Cueto MD   metoclopramide HCl (REGLAN) 10 MG tablet Take 1 tablet (10 mg total) by mouth every 6 (six) hours. 10/24/22   Antonina Novak MD   ondansetron (ZOFRAN-ODT) 4  MG TbDL Take 1 tablet (4 mg total) by mouth every 8 (eight) hours as needed (nausea, vomiting). 8/9/22   Kaushik Patton MD   pantoprazole (PROTONIX) 40 MG tablet Take 1 tablet (40 mg total) by mouth once daily. 8/26/22 10/24/22  Amalia Jefferson MD   vancomycin (VANCOCIN) 125 MG capsule TAKE 1 CAPSULE BY MOUTH FOUR TIMES DAILY FOR 10 DAYS  Patient taking differently: Take 125 mg by mouth 4 (four) times daily. 10/18/22   Amalia Jefferson MD   atenoloL (TENORMIN) 50 MG tablet Take 1 tablet (50 mg total) by mouth every other day. 7/17/22 7/27/22  Keegan Cody MD   omeprazole (PRILOSEC) 20 MG capsule Take 2 capsules (40 mg total) by mouth once daily. for 10 days 8/9/22 8/26/22  Kaushik Patton MD   torsemide (DEMADEX) 20 MG Tab Take 1 tablet (20 mg total) by mouth 2 (two) times a day.  Patient taking differently: Take 20 mg by mouth once daily. 6/11/22 7/15/22  Gurmeet Barrera MD        Medications this encounter:    [START ON 10/30/2022] amLODIPine  10 mg Oral Daily    apixaban  5 mg Oral BID    [START ON 11/1/2022] epoetin teresa  150 Units/kg Intravenous Every Tues, Thurs, Sat    erythromycin  250 mg Intravenous Q8H    hydrALAZINE  100 mg Oral Q8H    insulin detemir U-100  10 Units Subcutaneous QHS    levETIRAcetam  500 mg Oral BID    metoprolol succinate  50 mg Oral BID    mupirocin   Nasal BID    [START ON 10/30/2022] pantoprazole  40 mg Oral Before breakfast       Allergies: is allergic to penicillins.     Social:  reports that she has never smoked. She has never used smokeless tobacco. She reports that she does not currently use alcohol. She reports that she does not use drugs.     Family Hx: No family history of glaucoma, macular degeneration, or blindness. family history includes Diabetes in her father and mother.     ROS: see hpi     Ocular examination/Dilated fundus examination:  Base Eye Exam       Visual Acuity (near card)         Right Left    Dist sc LP 20/100    Dist ph sc  NI               Tonometry (Tonopen, 11:25 PM)         Right Left    Pressure 20 17              Pupils         Pupils Dark Light Shape React APD    Right PERRL 4 3.5 Round Minimal None    Left PERRL 4 3.5 Round Minimal None              Visual Fields (Counting fingers)         Right Left      Full    Restrictions Total superior temporal, inferior temporal, superior nasal, inferior nasal deficiencies               Extraocular Movement         Right Left     Full, Ortho Full, Ortho              Neuro/Psych       Mood/Affect: somnolent              Dilation       Both eyes: 1% Mydriacyl, 2.5% Phenylephrine @ 9:47 PM                  Additional Tests       Color         Right Left    Ishihara unable 10/16 Unclear if partially effort                  Slit Lamp and Fundus Exam       External Exam         Right Left    External Normal Normal              Pen Light Exam         Right Left    Lids/Lashes Normal Normal    Conjunctiva/Sclera sl miguel temporally White and quiet    Cornea Clear Clear    Anterior Chamber Deep and formed Deep and formed    Iris round, minimally reactive, no nvi or PS round, minimally reactive, no nvi or PS    Lens cortical spoking cortical spoking    Anterior Vitreous diffuse VH VH inferior              Fundus Exam         Right Left    Disc unable to assess unable to assess due to fibrosis covering disc, 1.5 DD darkly colored subretinal hemorrhage vs  rpe tear on nasal aspect of disc, 2 small IRH surrounding disc    C/D Ratio unable to assess unable to assess    Macula unable to assess flat, poor foveal reflex    Vessels unable to assess fibrosis from disc extending to proximal superior and inferior arcade, traction on superior vessels with elevation, no traction on macula    Periphery unable to assess inferior old VH, hard to assess periphery due to patient unable to keep gaze in one direction for a long time, grossly flat with large Preretinal heme nasally with pigment surrounding and small  regressed NV?                      =======================================    Imaging  CTA head neck: wnl  MRI brain: wnl     Bscan 10/29/22- see media for pictures   OD: fibrosis coming off of disc inferior and superior, significant VH diffusely, appears mostly flat without detachment though difficult to have pt look in one direction for extended amount of time    OS: fibrosis off of disc inf and superior, VH inferior, grossly attached 360 though difficult to have patient look in direction for extended amount of time    Assessment/Plan:   Ischemic retinopathy of unknown origin OU    VH OD   VH OS with fibrosis, and preretinal/intraretinal heme  - hx of ischemic retinopathy seen on exm 2/2022 R>L, possibly due to bp/anemia, db,  or hypercoagulable state.   - now with LP vision in OD (from 20/200) and 20/100 OS (from 20/70)   - iop wnl, eomi, CVF full OS/unable OD, color plated depressed OS to 8/14 (from full plates prior), minimal pupil reaction no APD OU   - normal appearing globes and orbits on MRI and CT brain from 10/29/22  - bscan 10/29/22 with significant vh OD with fibrosis superior and inferior from Optic nerve, no gross detachments seen, OS with small vh inferior and fibrosis from ONH  - VH ou likely due to proliferative retinopathy in setting of ischemic retinopathy of unknown origin seen on exam 2/2022  - rec continued work up for cause of CHF, ESRD. Ischemic event could be due to previous uncontrolled db, uncontrolled bp, anemia, or hypercoagulable state  - Recommend laying at 30 degree angle. No heavy lifting. Avoid additional NSAIDs or aspirin unless medically indicated by other providers   - Will have retina service evaluate patient on Monday if still inpt or Monday in clinic if discharged prior though unlikely         Discussed patient's history, physical, assessment and plan with Dr Koo.  If there are further questions, please page the on call ophthalmology resident.    Jairon Renteria MD  [id number],  Ophthalmology Resident  10/29/2022  9:19 PM

## 2022-10-30 NOTE — ASSESSMENT & PLAN NOTE
-received daily HD treatments on 10/27-28-29 prior to transfer.   -lungs CTA and on room air, lying supine in bed with normal work of breathing.

## 2022-10-30 NOTE — ASSESSMENT & PLAN NOTE
-Re-started home amlodipine 10, hydralazine 100 q8, Toprol 50 BID  -hydralazine 5mg IV PRN for SBP>185

## 2022-10-30 NOTE — ASSESSMENT & PLAN NOTE
-required 2 PRBC transfusions @ [facility], on 10/26, 10/28 - Hgb improved to 10 today  -Nephrology was ordering EPO for patient

## 2022-10-30 NOTE — ASSESSMENT & PLAN NOTE
S/p CTA Chest abd/pelvis, no acute findings to explain abdominal pain  -prior hospital med notes comment on distractibility on exam and concern for pain seeking behavior  -continue prior PRN medications patient was on PRN Norco and PRN IV dilaudid for severe pain, taper as tolerated.

## 2022-10-30 NOTE — ASSESSMENT & PLAN NOTE
-required 2 PRBC transfusions @ [facility], on 10/26, 10/28 - Hgb improved to 11 today  -Nephrology was ordering EPO for patient

## 2022-10-30 NOTE — HPI
32-year-old female with a history of end-stage renal disease on hemodialysis, diabetes, heart failure, gastroesophageal reflux disease, sickle cell trait, recent C diff, and hypertension admitted to [facility] on October 26 with chest discomfort, dyspnea, and blood tinged sputum.  She has chronic diffuse abdominal and back pain.  She had no fever or chills and no diarrhea.  She was admitted with volume overload, chest pain, and hyponatremia.  She underwent hemodialysis, and she received packed red blood cells.  Troponin was elevated during her stay.  Imaging studies were consistent with, but not diagnostic of, amyloid.  On October 29, she told the provider that she had several days of right eye vision loss.  She was not able to see shapes and everything appeared black.  She was able to see light shined in her eye.  She also noted unusual shaves consistent with floaters in the left eye.  Referring provider spoke with Ophthalmology at [facility].  Requesting transfer to Hospital Medicine at [facility] for further evaluation for vision changes.  With the persistent pain, along with vision changes and abnormal echocardiogram, and more extensive evaluation of her presenting findings may be needed.     October 29:  White blood cells 10.62, hemoglobin 10.1, hematocrit 28.8, platelets 135, sodium 131, potassium 3.2, chloride 98, CO2 25, BUN 39, creatinine 4.2, glucose 139     October 28:  White blood cells 13.35, hemoglobin 7, hematocrit 20.1, platelets 100, BNP greater than 4500  Immunoglobulin light chains, serum protein electrophoresis, and beta 2 microglobulin pending  -echocardiogram had EF 34%, LV diastolic dysfunction.  Strain study compatible with amyloid but not diagnostic of amyloid.     October 27: Troponin 0.159 (repeat 0.250)  -Echocardiogram had ejection fraction 55%, abnormal septal wall motion.  Mild-to-moderate TR.  Moderate circumferential pericardial effusion.  Mild pulmonary  hypertension.     October 26: Quantitative hCG 1.9, COVID negative, influenza negative  -chest x-ray showed bilateral perihilar interstitial lung opacities with enlarged cardiomediastinal silhouette.  -CT of the abdomen and pelvis noted opacities at both lung bases suggesting volume overload and/or interstitial pulmonary edema with superimposed multifocal viral or atypical pneumonia or nonspecific pneumonitis not excluded.  Small right pleural effusion and moderate-sized pericardial effusion.  Trace ascites and anasarca.       Bedside EVAL  -Patient is in stretcher, moaning on entry to room, she reports having ongoing back pain and abdominal discomfort, eyes closed in a lateral decubitus position.   On discussion of her vision complaints, she states a few days ago vision from right eye became blurred and then states suddenly she cannot see out of the right eye.  She denies any pain in the right eye.   Reports blurred or decreased vision in the right eye is worse than left.     On review of care everywhere records [facility] Ophthal[facility] clinic visits, last note Feb 2022 pt with diagnosis of Ichsemic retinopathy Right >Left, plan in note to optimize her chronic conditions of diabetes-glycemic control, anti-hypertensive management, and w/u of her CKD.   There are subsequent notes that patient missed f/u in March/April/may   On questioning re; state of her vision prior to hospitalization or if she had chronic visual deficits, patient states no.     Visual acuity in Feb 2022   Right eye 20/200 and Left Eye 20/70.    Patient has no corrective lenses on during examination

## 2022-10-30 NOTE — CARE UPDATE
Consult acknowledged    History of ESRD on HD TTS; last underwent dialysis on 10/27;10/28;10/29 for concerns of volume overload.     Evaluated patient; no acute needs for iHD today.     Full consult note to follow tomorrow. Please notify nephrology for any acute changes.

## 2022-10-30 NOTE — ASSESSMENT & PLAN NOTE
-s/p MRI brain and CTA head/neck w/o acute abnormality  -transferred for Ophthalmology consultation - will notify this evening.   -[facility] records indicat hx of chronic ischemic retinopathy right eye worse than left.

## 2022-10-30 NOTE — H&P
[facility] - Intensive Care ([address] Medicine  History & Physical    Patient Name: Tabby Howard  MRN: [id number]  Patient Class: IP- Inpatient  Admission Date: 10/29/2022  Attending Physician: Jacqueline Delgado MD [facility]  Admitting Physician: Morgan Whelan MD  Primary Care Provider: Primary Doctor No         Patient information was obtained from patient, past medical records and ER records.     Subjective:     Principal Problem:Vision loss, right eye    Chief Complaint:   Chief Complaint   Patient presents with    Loss of Vision     Right eye        HPI: 32-year-old female with a history of end-stage renal disease on hemodialysis, diabetes, heart failure, gastroesophageal reflux disease, sickle cell trait, recent C diff, and hypertension admitted to [facility] on October 26 with chest discomfort, dyspnea, and blood tinged sputum.  She has chronic diffuse abdominal and back pain.  She had no fever or chills and no diarrhea.  She was admitted with volume overload, chest pain, and hyponatremia.  She underwent hemodialysis, and she received packed red blood cells.  Troponin was elevated during her stay.  Imaging studies were consistent with, but not diagnostic of, amyloid.  On October 29, she told the provider that she had several days of right eye vision loss.  She was not able to see shapes and everything appeared black.  She was able to see light shined in her eye.  She also noted unusual shaves consistent with floaters in the left eye.  Referring provider spoke with Ophthalmology at [facility].  Requesting transfer to Hospital Medicine at [facility] for further evaluation for vision changes.  With the persistent pain, along with vision changes and abnormal echocardiogram, and more extensive evaluation of her presenting findings may be needed.     October 29:  White blood cells 10.62, hemoglobin 10.1, hematocrit 28.8, platelets 135, sodium 131, potassium 3.2,  chloride 98, CO2 25, BUN 39, creatinine 4.2, glucose 139     October 28:  White blood cells 13.35, hemoglobin 7, hematocrit 20.1, platelets 100, BNP greater than 4500  Immunoglobulin light chains, serum protein electrophoresis, and beta 2 microglobulin pending  -echocardiogram had EF 34%, LV diastolic dysfunction.  Strain study compatible with amyloid but not diagnostic of amyloid.     October 27: Troponin 0.159 (repeat 0.250)  -Echocardiogram had ejection fraction 55%, abnormal septal wall motion.  Mild-to-moderate TR.  Moderate circumferential pericardial effusion.  Mild pulmonary hypertension.     October 26: Quantitative hCG 1.9, COVID negative, influenza negative  -chest x-ray showed bilateral perihilar interstitial lung opacities with enlarged cardiomediastinal silhouette.  -CT of the abdomen and pelvis noted opacities at both lung bases suggesting volume overload and/or interstitial pulmonary edema with superimposed multifocal viral or atypical pneumonia or nonspecific pneumonitis not excluded.  Small right pleural effusion and moderate-sized pericardial effusion.  Trace ascites and anasarca.       Bedside EVAL  -Patient is in stretcher, moaning on entry to room, she reports having ongoing back pain and abdominal discomfort, eyes closed in a lateral decubitus position.   On discussion of her vision complaints, she states a few days ago vision from right eye became blurred and then states suddenly she cannot see out of the right eye.  She denies any pain in the right eye.   Reports blurred or decreased vision in the right eye is worse than left.     On review of care everywhere records [facility] Ophthalomology clinic visits, last note Feb 2022 pt with diagnosis of Ichsemic retinopathy Right >Left, plan in note to optimize her chronic conditions of diabetes-glycemic control, anti-hypertensive management, and w/u of her CKD.   There are subsequent notes that patient missed f/u in March/April/may   On questioning re;  state of her vision prior to hospitalization or if she had chronic visual deficits, patient states no.     Visual acuity in 2022   Right eye 20/200 and Left Eye 20/70.    Patient has no corrective lenses on during examination      Past Medical History:   Diagnosis Date    CKD (chronic kidney disease), stage IV 2022    Diabetes mellitus due to underlying condition with unspecified complications 2022    Gastroparesis 2022    Heart failure with preserved ejection fraction 2022    EF 55% on 3/22    History of gastroesophageal reflux (GERD)     History of supraventricular tachycardia     Hyperkalemia 2022    Hypertensive emergency 2022    Sickle cell trait 2022    Type 2 diabetes mellitus        Past Surgical History:   Procedure Laterality Date     SECTION      x 3    COLONOSCOPY      COLONOSCOPY N/A 2022    Procedure: COLONOSCOPY;  Surgeon: Jagdeep Cedeno MD;  Location: [facility];  Service: Endoscopy;  Laterality: N/A;    ESOPHAGOGASTRODUODENOSCOPY N/A 10/18/2019    Procedure: ESOPHAGOGASTRODUODENOSCOPY (EGD);  Surgeon: Gianluca Mendez MD;  Location: [facility];  Service: Endoscopy;  Laterality: N/A;    ESOPHAGOGASTRODUODENOSCOPY N/A 2022    Procedure: EGD (ESOPHAGOGASTRODUODENOSCOPY);  Surgeon: Micky Paredes III, MD;  Location: [facility];  Service: Endoscopy;  Laterality: N/A;    LAPAROSCOPIC CHOLECYSTECTOMY N/A 2022    Procedure: CHOLECYSTECTOMY, LAPAROSCOPIC;  Surgeon: Grey Perez MD;  Location: [facility] OR;  Service: General;  Laterality: N/A;    PLACEMENT OF DUAL-LUMEN VASCULAR CATHETER Left 2022    Procedure: INSERTION-CATHETER-JOSEPH;  Surgeon: Dionte Gan MD;  Location: [facility] OR;  Service: General;  Laterality: Left;    PLACEMENT OF DUAL-LUMEN VASCULAR CATHETER Right 2022    Procedure: INSERTION-CATHETER-Hemosplit;  Surgeon: Dionte Gan MD;  Location: [facility] OR;  Service: General;  Laterality: Right;       Review of patient's  allergies indicates:   Allergen Reactions    Penicillins Hives       Current Facility-Administered Medications on File Prior to Encounter   Medication    [COMPLETED] ALPRAZolam tablet 0.5 mg    [COMPLETED] iodixanoL (VISIPAQUE 320) injection 100 mL    [DISCONTINUED] 0.9%  NaCl infusion (for blood administration)    [DISCONTINUED] acetaminophen tablet 650 mg    [DISCONTINUED] ALPRAZolam tablet 0.5 mg    [DISCONTINUED] amLODIPine tablet 10 mg    [DISCONTINUED] apixaban tablet 5 mg    [DISCONTINUED] dextrose 10% bolus 125 mL    [DISCONTINUED] dextrose 10% bolus 250 mL    [DISCONTINUED] epoetin teresa-epbx injection 8,900 Units    [DISCONTINUED] glucagon (human recombinant) injection 1 mg    [DISCONTINUED] glucose chewable tablet 16 g    [DISCONTINUED] glucose chewable tablet 24 g    [DISCONTINUED] hydrALAZINE injection 5 mg    [DISCONTINUED] hydrALAZINE tablet 100 mg    [DISCONTINUED] HYDROcodone-acetaminophen 5-325 mg per tablet 1 tablet    [DISCONTINUED] HYDROmorphone injection 0.5 mg    [DISCONTINUED] insulin aspart U-100 pen 1-10 Units    [DISCONTINUED] insulin aspart U-100 pen 5 Units    [DISCONTINUED] insulin detemir U-100 pen 10 Units    [DISCONTINUED] melatonin tablet 6 mg    [DISCONTINUED] metoprolol succinate (TOPROL-XL) 24 hr tablet 50 mg    [DISCONTINUED] mupirocin 2 % ointment    [DISCONTINUED] naloxone 0.4 mg/mL injection 0.02 mg    [DISCONTINUED] ondansetron injection 4 mg    [DISCONTINUED] pantoprazole EC tablet 40 mg    [DISCONTINUED] promethazine (PHENERGAN) 25 mg in dextrose 5 % 50 mL IVPB    [DISCONTINUED] sodium chloride 0.9% flush 10 mL     Current Outpatient Medications on File Prior to Encounter   Medication Sig    albuterol (PROVENTIL/VENTOLIN HFA) 90 mcg/actuation inhaler Inhale 2 puffs into the lungs every 6 (six) hours as needed for Wheezing. Rescue    amLODIPine (NORVASC) 10 MG tablet Take 1 tablet (10 mg total) by mouth once daily.    apixaban (ELIQUIS) 5 mg Tab Take 1 tablet (5 mg total) by  mouth 2 (two) times daily. For second month on.    carvediloL (COREG) 25 MG tablet Take 1 tablet (25 mg total) by mouth 2 (two) times daily.    cloNIDine 0.2 mg/24 hr td ptwk (CATAPRES) 0.2 mg/24 hr Place 1 patch onto the skin every 7 days.    famotidine (PEPCID) 20 MG tablet Take 1 tablet (20 mg total) by mouth once daily.    FLUoxetine 20 MG capsule Take 1 capsule (20 mg total) by mouth once daily.    furosemide (LASIX) 40 MG tablet Take 1 tablet (40 mg total) by mouth 2 (two) times daily.    hydrALAZINE (APRESOLINE) 100 MG tablet Take 1 tablet (100 mg total) by mouth every 8 (eight) hours.    insulin aspart U-100 (NOVOLOG) 100 unit/mL (3 mL) InPn pen Inject 1-10 Units into the skin before meals and at bedtime as needed (Hyperglycemia).    isosorbide mononitrate (IMDUR) 60 MG 24 hr tablet Take 2 tablets (120 mg total) by mouth once daily.    LANTUS U-100 INSULIN 100 unit/mL injection Inject 5 Units into the skin once daily.    levETIRAcetam (KEPPRA) 500 MG Tab Take 1 tablet (500 mg total) by mouth 2 (two) times daily.    metoclopramide HCl (REGLAN) 10 MG tablet Take 1 tablet (10 mg total) by mouth every 6 (six) hours.    ondansetron (ZOFRAN-ODT) 4 MG TbDL Take 1 tablet (4 mg total) by mouth every 8 (eight) hours as needed (nausea, vomiting).    pantoprazole (PROTONIX) 40 MG tablet Take 1 tablet (40 mg total) by mouth once daily.    vancomycin (VANCOCIN) 125 MG capsule TAKE 1 CAPSULE BY MOUTH FOUR TIMES DAILY FOR 10 DAYS (Patient taking differently: Take 125 mg by mouth 4 (four) times daily.)    [DISCONTINUED] atenoloL (TENORMIN) 50 MG tablet Take 1 tablet (50 mg total) by mouth every other day.    [DISCONTINUED] omeprazole (PRILOSEC) 20 MG capsule Take 2 capsules (40 mg total) by mouth once daily. for 10 days    [DISCONTINUED] torsemide (DEMADEX) 20 MG Tab Take 1 tablet (20 mg total) by mouth 2 (two) times a day. (Patient taking differently: Take 20 mg by mouth once daily.)     Family History       Problem  Relation (Age of Onset)    Diabetes Mother, Father          Tobacco Use    Smoking status: Never    Smokeless tobacco: Never   Substance and Sexual Activity    Alcohol use: Not Currently    Drug use: No    Sexual activity: Not Currently     Partners: Male     Birth control/protection: I.U.D.     Review of Systems   HENT:  Negative for trouble swallowing.    Cardiovascular:  Negative for chest pain, palpitations and leg swelling.   Gastrointestinal:  Positive for abdominal pain and nausea. Negative for constipation and diarrhea.   Genitourinary:  Negative for dysuria.   Musculoskeletal:  Negative for back pain.   All other systems reviewed and are negative.  Objective:     Vital Signs (Most Recent):    Vital Signs (24h Range):  Temp:  [97.5 °F (36.4 °C)-98.7 °F (37.1 °C)] 98 °F (36.7 °C)  Pulse:  [78-86] 86  Resp:  [15-20] 18  SpO2:  [96 %-99 %] 98 %  BP: (111-162)/(63-89) 162/89     Weight: 59 kg (130 lb 1.1 oz)  Body mass index is 23.79 kg/m².    Physical Exam  Vitals and nursing note reviewed.   HENT:      Head: Normocephalic and atraumatic.   Eyes:      General: Lids are normal. Gaze aligned appropriately. No scleral icterus.        Right eye: No discharge.         Left eye: No discharge.      Extraocular Movements:      Right eye: Abnormal extraocular motion present.      Left eye: No nystagmus.      Conjunctiva/sclera: Conjunctivae normal.      Right eye: Right conjunctiva is not injected.      Left eye: Left conjunctiva is not injected.      Comments: Abnormal EOM unclear if this is effort related or if patient with right superior lateral gaze deficit   Neck:      Vascular: No JVD.   Cardiovascular:      Heart sounds: Normal heart sounds.   Pulmonary:      Effort: Pulmonary effort is normal.      Breath sounds: Normal breath sounds.   Abdominal:      General: Abdomen is flat. There is no distension.      Palpations: Abdomen is soft.      Tenderness: There is no abdominal tenderness. There is no guarding.       Comments: Hyperactive bowel sounds   Musculoskeletal:         General: Normal range of motion.      Right lower leg: No edema.      Left lower leg: No edema.   Skin:     General: Skin is warm and dry.   Neurological:      General: No focal deficit present.      Mental Status: She is alert.   Psychiatric:         Mood and Affect: Mood normal.           Significant Labs: All pertinent labs within the past 24 hours have been reviewed.  CBC:   Recent Labs   Lab 10/28/22  0811 10/29/22  0833   WBC 13.35* 10.62   HGB 7.0* 10.1*   HCT 20.1* 28.8*   * 135*     CMP:   Recent Labs   Lab 10/28/22  0811 10/29/22  0833    131*   K 3.4* 3.2*    98   CO2 29 25   * 139*   BUN 27* 39*   CREATININE 3.0* 4.2*   CALCIUM 8.8 8.4*   ANIONGAP 7* 8     Cardiac Markers:   Recent Labs   Lab 10/28/22  0811   BNP >4,500*     Coagulation: No results for input(s): PT, INR, APTT in the last 48 hours.  Lactic Acid:   Recent Labs   Lab 10/29/22  1431   LACTATE 0.7     Urine Studies: No results for input(s): COLORU, APPEARANCEUA, PHUR, SPECGRAV, PROTEINUA, GLUCUA, KETONESU, BILIRUBINUA, OCCULTUA, NITRITE, UROBILINOGEN, LEUKOCYTESUR, RBCUA, WBCUA, BACTERIA, SQUAMEPITHEL, HYALINECASTS in the last 48 hours.    Invalid input(s): WRIGHTSUR    Significant Imaging: I have reviewed all pertinent imaging results/findings within the past 24 hours.    CTA HEAD AND NECK     CLINICAL DATA: Visual disturbance     CMS MANDATED QUALITY DATA - CT RADIATION  436     All CT scans at this facility utilize dose modulation, iterative reconstruction, and/or weight based dosing when appropriate to reduce radiation dose to as low as reasonably achievable.     CMS MANDATED QUALITY DATA - CAROTID - 195     All measurements and percent stenosis described below were determined using NASCET criteria or criteria similar to NASCET, as defined by the Society of Radiologists in Ultrasound Consensus Conference, Radiology, 2003     CT angiography of the head  and neck was performed. 100 mL nonionic contrast was administered. 3-D multiplanar reconstruction images were obtained and stored as part of the patient's permanent medical record.     CTA NECK:     The heart is enlarged. The aortic arch is normal in appearance. Great vessel origins are unremarkable.     The bilateral common carotid and internal carotid arteries are normal in appearance, without plaque formation or luminal diameter narrowing. The bilateral vertebral arteries are patent with dominant left vertebral artery noted.     Incidentally noted are circumscribed hypodense nodules within the thyroid gland measuring up to 13 mm in size. These are similar in appearance compared to a prior CT study from July 26.        CTA BRAIN:     Intracranial portions of the internal carotid arteries are patent and within normal limits. The basilar artery is small on a developmental basis but otherwise normal.     The bilateral anterior, middle, and posterior cerebral arteries are normal, without major branch occlusion, high-grade stenosis, or aneurysm. Patent anterior communicating artery and posterior communicating arteries are noted.     IMPRESSION:  1. Normal CT appearance of the extracranial carotid systems.  2. Normal CTA brain.  3. Incidental observations as above.    Narrative & Impression  MRI brain without contrast     CLINICAL DATA: Headaches, visual disturbance     FINDINGS: Multiplanar noncontrast imaging was performed.     There is no intracranial mass, hemorrhage, or midline shift. Ventricles and sulci are within normal limits. There are no pathologic extra-axial fluid collections.     There is no evidence of ischemic change, edema, or demyelinating disease. The cerebellum and brainstem are unremarkable.     Bilateral mastoid effusions are noted. The orbits, skull base, and sella turcica are within normal limits. There is a trace amount of fluid in the left maxillary sinus.     IMPRESSION:  1. Normal MR  appearance of the brain.  2. Mild bilateral mastoid effusions and trace amount of fluid in the left maxillary sinus.     Electronically signed by:  Zaid Wilson MD  10/29/2022 3:16 PM CDT Workstation: [id number]    CTA chest abdomen and pelvis     CLINICAL DATA: Pain     CMS MANDATED QUALITY DATA - CT RADIATION  436     All CT scans at this facility utilize dose modulation, iterative reconstruction, and/or weight based dosing when appropriate to reduce radiation dose to as low as reasonably achievable.     Findings: CT angiography of the chest abdomen and pelvis was performed. 3-D multiplanar reconstruction images were obtained and stored as a part of the patient's permanent medical record.     Images of the chest are compared to July 26.     The heart is enlarged. There is a moderate-sized pericardial effusion, similar to the previous study. The thoracic aorta is normal in appearance without dissection. No pulmonary arterial filling defects are identified.     Images at lung windows demonstrate minor atelectasis in the left lower lobe. The lungs are otherwise clear. There are no pleural effusions.     No acute osseous abnormality is identified.     Images of the abdomen and pelvis are compared to October 26.     The liver is enlarged with diffuse hepatic steatosis. The gallbladder is absent. The biliary tree is nondilated. The spleen is borderline enlarged at 12.4 cm in length, unchanged. The pancreas and adrenal glands are unremarkable. Renal enhancement pattern is moderately heterogeneous. No mass or hydronephrosis is identified. Moderate left-sided renal cortical scarring is noted. The abdominal aorta is normal in caliber without evidence of aneurysm or dissection.     There is no pathologic bowel wall thickening or evidence of obstruction. There is a moderate amount of retained ingested food material within the stomach. No free air is identified.     Images of the pelvis demonstrate an unremarkable  urinary bladder. Bowel structures are within normal limits. No significant free fluid is identified.     Mild diffuse body wall edema is noted. No acute osseous abnormality is demonstrated.     IMPRESSION:  1. Moderate-sized pericardial effusion, similar in appearance to recent prior exams.  2. Diffuse body wall edema, also similar to prior studies.  3. Moderately heterogeneous enhancement pattern of both kidneys. This is nonspecific but could be seen in the setting of infection or acute or chronic renal cortical disease. Correlate with renal function studies and urinalysis. There is moderate left-sided renal cortical scarring.  4. Hepatosplenomegaly.  5. Additional findings as above.     Electronically signed by:  Zaid Wilson MD  10/29/2022 3:55 PM CDT Workstation: [id number]      Transthoracic echo (TTE) complete  Order# [id number]  Reading physician: Osbaldo Hart MD Ordering physician: Suzy Bobo MD Study date: 10/27/22     Reason for Exam  Priority: Routine  Dx: Pericardial effusion [I31.39 (ICD-10-CM)]   Comments: Limited echo, known history of moderate circumferential pericardial effusion, re-evaluate     Result Image Hyperlink     Show images for Echo  Summary       The left ventricle is normal in size with moderate moderate asymmetric hypertrophy eccentric hypertrophy and normal systolic function.  The estimated ejection fraction is 55%.  Indeterminate left ventricular diastolic function with normal left atrial pressure.  There are segmental left ventricular wall motion abnormalities.  Atrial fibrillation not observed.  Normal right ventricular size with normal right ventricular systolic function.  There is right ventricular hypertrophy.  There is abnormal septal wall motion. There is systolic flattening of the interventricular septum consistent with right ventricle pressure overload.  Mild to moderate tricuspid regurgitation.  Normal central venous pressure (3 mmHg).  The estimated PA  systolic pressure is 35 mmHg.  Moderate circumferential pericardial effusion. Effusion is fluid.     1. Small to moderate pericardial effusion without any evidence of tamponade  No ventricular interdependence or right atrial right ventricle collapse  2. Left ventricle hypertrophy with posterior wall thicker than septum  Septal hypokinesis noted  3. Pulmonary hypertension mild  4. Prominent eustachian ridge in the right atrium     Assessment/Plan:     * Vision loss, right eye  -s/p MRI brain and CTA head/neck w/o acute abnormality  -transferred for Ophthalmology consultation - will notify this evening.   -[facility] records indicat hx of chronic ischemic retinopathy right eye worse than left.         Volume overload with pulmonary edema  -received daily HD treatments on 10/27-28-29 prior to transfer.   -lungs CTA and on room air, lying supine in bed with normal work of breathing.       ESRD (end stage renal disease)  Nephrology consultation to continue HD  -her outpatient schedule is Tu/Th/Sa.       Pericardial effusion  S/p new ECHO and evaluated by Cardiology Dr. Hart @ [facility],   -effusion is hemodynamically INSIGNIFICANT, unchanged in size at this time   -there is concern for possible infiltrative Cardiomyopathy thought      Anemia, acute on chronic  -required 2 PRBC transfusions @ [facility], on 10/26, 10/28 - Hgb improved to 10 today  -Nephrology was ordering EPO for patient     Chronic abdominal pain  S/p CTA Chest abd/pelvis, no acute findings to explain abdominal pain  -prior hospital med notes comment on distractibility on exam and concern for pain seeking behavior  -continue prior PRN medications patient was on PRN Norco and PRN IV dilaudid for severe pain, taper as tolerated.       Type 2 diabetes mellitus with chronic kidney disease on chronic dialysis, with long-term current use of insulin  Patient's FSGs are controlled on current medication regimen.  Last A1c reviewed-   Lab Results   Component  Value Date    HGBA1C 6.2 08/24/2022     Most recent fingerstick glucose reviewed- No results for input(s): POCTGLUCOSE in the last 24 hours.  Current correctional scale  Low  Maintain anti-hyperglycemic dose as follows-   Antihyperglycemics (From admission, onward)      Start     Stop Route Frequency Ordered    10/29/22 2100  insulin detemir U-100 pen 10 Units         -- SubQ Nightly 10/29/22 1930    10/29/22 1928  insulin aspart U-100 pen 1-10 Units         -- SubQ Before meals & nightly PRN 10/29/22 1930          Hold Oral hypoglycemics while patient is in the hospital.    Gastroparesis  -CT notes retained stomach contents  -prior notes indicate c/o of nausea/vomiting - continue PRN zofran/phenergn  -on no Motility agents such as Reglan or Erythromycin, has consistently been receiving opiates, likely will need opiates weaned,   -Reglan - receive pharmacy contraindication with hx of seizure disorder.  Will scheduled Erythryomycin for gastroparesis flare       VTE Risk Mitigation (From admission, onward)           Ordered     apixaban tablet 5 mg  2 times daily         10/29/22 1930     IP VTE HIGH RISK PATIENT  Once         10/29/22 1930     Place sequential compression device  Until discontinued         10/29/22 1930     Reason for No Pharmacological VTE Prophylaxis  Once        Question:  Reasons:  Answer:  Already adequately anticoagulated on oral Anticoagulants    10/29/22 1930     Place sequential compression device  Until discontinued         10/29/22 1930                       Morgan Whelan MD  Department of Hospital Medicine   [facility] - Intensive Care ([place]-[facility])

## 2022-10-30 NOTE — HOSPITAL COURSE
Evaluated by ophthalmology, no further inpatient intervention; vitreous hemorrhage due to ischemic retinopathy, will see in clinic. Significant hyperglycemia to 531 on 10/31, SSI increased to medium dose, mealtime aspart increased to 4U. Patient's back and stomach pain controlled with Tylenol, gabapentin, hydrocodone-acetaminophen, and IV dilaudid for breakthrough pain.

## 2022-10-31 PROBLEM — D63.1 ANEMIA OF CHRONIC KIDNEY FAILURE: Status: RESOLVED | Noted: 2021-04-24 | Resolved: 2022-10-31

## 2022-10-31 PROBLEM — M89.9 CHRONIC KIDNEY DISEASE-MINERAL AND BONE DISORDER: Status: ACTIVE | Noted: 2021-04-24

## 2022-10-31 PROBLEM — E83.9 CHRONIC KIDNEY DISEASE-MINERAL AND BONE DISORDER: Status: ACTIVE | Noted: 2021-04-24

## 2022-10-31 PROBLEM — E87.6 HYPOKALEMIA: Status: RESOLVED | Noted: 2022-10-26 | Resolved: 2022-10-31

## 2022-10-31 PROBLEM — N18.9 ANEMIA OF CHRONIC KIDNEY FAILURE: Status: RESOLVED | Noted: 2021-04-24 | Resolved: 2022-10-31

## 2022-10-31 PROBLEM — D63.1 ANEMIA OF CHRONIC KIDNEY FAILURE, STAGE 5: Status: ACTIVE | Noted: 2022-10-31

## 2022-10-31 PROBLEM — N18.5 ANEMIA OF CHRONIC KIDNEY FAILURE, STAGE 5: Status: ACTIVE | Noted: 2022-10-31

## 2022-10-31 LAB
ALBUMIN SERPL BCP-MCNC: 2.5 G/DL (ref 3.5–5.2)
ALBUMIN SERPL ELPH-MCNC: 3.14 G/DL (ref 3.35–5.55)
ALP SERPL-CCNC: 106 U/L (ref 55–135)
ALPHA1 GLOB SERPL ELPH-MCNC: 0.38 G/DL (ref 0.17–0.41)
ALPHA2 GLOB SERPL ELPH-MCNC: 0.83 G/DL (ref 0.43–0.99)
ALT SERPL W/O P-5'-P-CCNC: 19 U/L (ref 10–44)
ANION GAP SERPL CALC-SCNC: 10 MMOL/L (ref 8–16)
AST SERPL-CCNC: 13 U/L (ref 10–40)
B-GLOBULIN SERPL ELPH-MCNC: 0.53 G/DL (ref 0.5–1.1)
B2 MICROGLOB SERPL-MCNC: 15.3 MG/L (ref 0.6–2.4)
BASOPHILS # BLD AUTO: 0.04 K/UL (ref 0–0.2)
BASOPHILS NFR BLD: 0.5 % (ref 0–1.9)
BILIRUB SERPL-MCNC: 0.9 MG/DL (ref 0.1–1)
BUN SERPL-MCNC: 32 MG/DL (ref 6–20)
CALCIUM SERPL-MCNC: 7.7 MG/DL (ref 8.7–10.5)
CHLORIDE SERPL-SCNC: 101 MMOL/L (ref 95–110)
CO2 SERPL-SCNC: 12 MMOL/L (ref 23–29)
CREAT SERPL-MCNC: 4.8 MG/DL (ref 0.5–1.4)
DIFFERENTIAL METHOD: ABNORMAL
EOSINOPHIL # BLD AUTO: 0.1 K/UL (ref 0–0.5)
EOSINOPHIL NFR BLD: 1.3 % (ref 0–8)
ERYTHROCYTE [DISTWIDTH] IN BLOOD BY AUTOMATED COUNT: 15.2 % (ref 11.5–14.5)
EST. GFR  (NO RACE VARIABLE): 11.6 ML/MIN/1.73 M^2
GAMMA GLOB SERPL ELPH-MCNC: 1.03 G/DL (ref 0.67–1.58)
GLUCOSE SERPL-MCNC: 531 MG/DL (ref 70–110)
HCT VFR BLD AUTO: 29.7 % (ref 37–48.5)
HGB BLD-MCNC: 10.1 G/DL (ref 12–16)
IMM GRANULOCYTES # BLD AUTO: 0.08 K/UL (ref 0–0.04)
IMM GRANULOCYTES NFR BLD AUTO: 1.1 % (ref 0–0.5)
LYMPHOCYTES # BLD AUTO: 1.3 K/UL (ref 1–4.8)
LYMPHOCYTES NFR BLD: 16.8 % (ref 18–48)
MAGNESIUM SERPL-MCNC: 1.7 MG/DL (ref 1.6–2.6)
MCH RBC QN AUTO: 29.8 PG (ref 27–31)
MCHC RBC AUTO-ENTMCNC: 34 G/DL (ref 32–36)
MCV RBC AUTO: 88 FL (ref 82–98)
MONOCYTES # BLD AUTO: 1 K/UL (ref 0.3–1)
MONOCYTES NFR BLD: 13.2 % (ref 4–15)
NEUTROPHILS # BLD AUTO: 5 K/UL (ref 1.8–7.7)
NEUTROPHILS NFR BLD: 67.1 % (ref 38–73)
NRBC BLD-RTO: 0 /100 WBC
PHOSPHATE SERPL-MCNC: 3.5 MG/DL (ref 2.7–4.5)
PLATELET # BLD AUTO: 140 K/UL (ref 150–450)
PMV BLD AUTO: 10.1 FL (ref 9.2–12.9)
POCT GLUCOSE: 115 MG/DL (ref 70–110)
POCT GLUCOSE: 215 MG/DL (ref 70–110)
POCT GLUCOSE: 231 MG/DL (ref 70–110)
POCT GLUCOSE: 410 MG/DL (ref 70–110)
POCT GLUCOSE: 65 MG/DL (ref 70–110)
POCT GLUCOSE: 69 MG/DL (ref 70–110)
POCT GLUCOSE: 89 MG/DL (ref 70–110)
POTASSIUM SERPL-SCNC: 4.5 MMOL/L (ref 3.5–5.1)
PROT SERPL-MCNC: 5.5 G/DL (ref 6–8.4)
PROT SERPL-MCNC: 5.9 G/DL (ref 6–8.4)
RBC # BLD AUTO: 3.39 M/UL (ref 4–5.4)
SODIUM SERPL-SCNC: 123 MMOL/L (ref 136–145)
WBC # BLD AUTO: 7.43 K/UL (ref 3.9–12.7)

## 2022-10-31 PROCEDURE — 80053 COMPREHEN METABOLIC PANEL: CPT | Performed by: STUDENT IN AN ORGANIZED HEALTH CARE EDUCATION/TRAINING PROGRAM

## 2022-10-31 PROCEDURE — 99233 SBSQ HOSP IP/OBS HIGH 50: CPT | Mod: ,,, | Performed by: STUDENT IN AN ORGANIZED HEALTH CARE EDUCATION/TRAINING PROGRAM

## 2022-10-31 PROCEDURE — 25000003 PHARM REV CODE 250: Performed by: NURSE PRACTITIONER

## 2022-10-31 PROCEDURE — 25000003 PHARM REV CODE 250: Performed by: HOSPITALIST

## 2022-10-31 PROCEDURE — 12000002 HC ACUTE/MED SURGE SEMI-PRIVATE ROOM

## 2022-10-31 PROCEDURE — 63600175 PHARM REV CODE 636 W HCPCS: Performed by: STUDENT IN AN ORGANIZED HEALTH CARE EDUCATION/TRAINING PROGRAM

## 2022-10-31 PROCEDURE — 63600175 PHARM REV CODE 636 W HCPCS

## 2022-10-31 PROCEDURE — 36415 COLL VENOUS BLD VENIPUNCTURE: CPT | Performed by: STUDENT IN AN ORGANIZED HEALTH CARE EDUCATION/TRAINING PROGRAM

## 2022-10-31 PROCEDURE — 63600175 PHARM REV CODE 636 W HCPCS: Performed by: NURSE PRACTITIONER

## 2022-10-31 PROCEDURE — 99223 PR INITIAL HOSPITAL CARE,LEVL III: ICD-10-PCS | Mod: ,,, | Performed by: NURSE PRACTITIONER

## 2022-10-31 PROCEDURE — 83735 ASSAY OF MAGNESIUM: CPT | Performed by: STUDENT IN AN ORGANIZED HEALTH CARE EDUCATION/TRAINING PROGRAM

## 2022-10-31 PROCEDURE — 99233 PR SUBSEQUENT HOSPITAL CARE,LEVL III: ICD-10-PCS | Mod: ,,, | Performed by: STUDENT IN AN ORGANIZED HEALTH CARE EDUCATION/TRAINING PROGRAM

## 2022-10-31 PROCEDURE — 90935 HEMODIALYSIS ONE EVALUATION: CPT

## 2022-10-31 PROCEDURE — 99223 1ST HOSP IP/OBS HIGH 75: CPT | Mod: ,,, | Performed by: NURSE PRACTITIONER

## 2022-10-31 PROCEDURE — 84100 ASSAY OF PHOSPHORUS: CPT | Performed by: STUDENT IN AN ORGANIZED HEALTH CARE EDUCATION/TRAINING PROGRAM

## 2022-10-31 PROCEDURE — 25000003 PHARM REV CODE 250

## 2022-10-31 PROCEDURE — 63600175 PHARM REV CODE 636 W HCPCS: Mod: JG | Performed by: HOSPITALIST

## 2022-10-31 PROCEDURE — 25000003 PHARM REV CODE 250: Performed by: STUDENT IN AN ORGANIZED HEALTH CARE EDUCATION/TRAINING PROGRAM

## 2022-10-31 PROCEDURE — 85025 COMPLETE CBC W/AUTO DIFF WBC: CPT | Performed by: STUDENT IN AN ORGANIZED HEALTH CARE EDUCATION/TRAINING PROGRAM

## 2022-10-31 RX ORDER — INSULIN ASPART 100 [IU]/ML
4 INJECTION, SOLUTION INTRAVENOUS; SUBCUTANEOUS
Status: DISCONTINUED | OUTPATIENT
Start: 2022-10-31 | End: 2022-11-01 | Stop reason: HOSPADM

## 2022-10-31 RX ORDER — INSULIN ASPART 100 [IU]/ML
0-10 INJECTION, SOLUTION INTRAVENOUS; SUBCUTANEOUS
Status: DISCONTINUED | OUTPATIENT
Start: 2022-10-31 | End: 2022-11-01 | Stop reason: HOSPADM

## 2022-10-31 RX ORDER — SODIUM CHLORIDE 9 MG/ML
INJECTION, SOLUTION INTRAVENOUS ONCE
Status: COMPLETED | OUTPATIENT
Start: 2022-10-31 | End: 2022-10-31

## 2022-10-31 RX ORDER — HEPARIN SODIUM 1000 [USP'U]/ML
1000 INJECTION, SOLUTION INTRAVENOUS; SUBCUTANEOUS
Status: DISCONTINUED | OUTPATIENT
Start: 2022-10-31 | End: 2022-11-01 | Stop reason: HOSPADM

## 2022-10-31 RX ORDER — HYDROMORPHONE HYDROCHLORIDE 1 MG/ML
0.2 INJECTION, SOLUTION INTRAMUSCULAR; INTRAVENOUS; SUBCUTANEOUS EVERY 6 HOURS PRN
Status: DISCONTINUED | OUTPATIENT
Start: 2022-10-31 | End: 2022-11-01

## 2022-10-31 RX ORDER — INSULIN ASPART 100 [IU]/ML
5 INJECTION, SOLUTION INTRAVENOUS; SUBCUTANEOUS
Status: DISCONTINUED | OUTPATIENT
Start: 2022-10-31 | End: 2022-10-31

## 2022-10-31 RX ORDER — ACETAMINOPHEN 325 MG/1
650 TABLET ORAL EVERY 8 HOURS
Status: DISCONTINUED | OUTPATIENT
Start: 2022-10-31 | End: 2022-11-01 | Stop reason: HOSPADM

## 2022-10-31 RX ORDER — INSULIN ASPART 100 [IU]/ML
7 INJECTION, SOLUTION INTRAVENOUS; SUBCUTANEOUS ONCE
Status: COMPLETED | OUTPATIENT
Start: 2022-10-31 | End: 2022-10-31

## 2022-10-31 RX ADMIN — GABAPENTIN 100 MG: 100 CAPSULE ORAL at 10:10

## 2022-10-31 RX ADMIN — HYDROCODONE BITARTRATE AND ACETAMINOPHEN 1 TABLET: 5; 325 TABLET ORAL at 07:10

## 2022-10-31 RX ADMIN — METOPROLOL SUCCINATE 50 MG: 25 TABLET, EXTENDED RELEASE ORAL at 10:10

## 2022-10-31 RX ADMIN — METOPROLOL SUCCINATE 50 MG: 25 TABLET, EXTENDED RELEASE ORAL at 09:10

## 2022-10-31 RX ADMIN — INSULIN ASPART 5 UNITS: 100 INJECTION, SOLUTION INTRAVENOUS; SUBCUTANEOUS at 01:10

## 2022-10-31 RX ADMIN — APIXABAN 5 MG: 5 TABLET, FILM COATED ORAL at 10:10

## 2022-10-31 RX ADMIN — GABAPENTIN 100 MG: 100 CAPSULE ORAL at 09:10

## 2022-10-31 RX ADMIN — LEVETIRACETAM 500 MG: 500 TABLET, FILM COATED ORAL at 10:10

## 2022-10-31 RX ADMIN — HYDROCODONE BITARTRATE AND ACETAMINOPHEN 1 TABLET: 5; 325 TABLET ORAL at 02:10

## 2022-10-31 RX ADMIN — ERYTHROMYCIN LACTOBIONATE 250 MG: 500 INJECTION, POWDER, LYOPHILIZED, FOR SOLUTION INTRAVENOUS at 08:10

## 2022-10-31 RX ADMIN — HYDROMORPHONE HYDROCHLORIDE 0.5 MG: 1 INJECTION, SOLUTION INTRAMUSCULAR; INTRAVENOUS; SUBCUTANEOUS at 04:10

## 2022-10-31 RX ADMIN — INSULIN ASPART 1 UNITS: 100 INJECTION, SOLUTION INTRAVENOUS; SUBCUTANEOUS at 10:10

## 2022-10-31 RX ADMIN — APIXABAN 5 MG: 5 TABLET, FILM COATED ORAL at 09:10

## 2022-10-31 RX ADMIN — INSULIN ASPART 7 UNITS: 100 INJECTION, SOLUTION INTRAVENOUS; SUBCUTANEOUS at 10:10

## 2022-10-31 RX ADMIN — ERYTHROPOIETIN 6000 UNITS: 4000 INJECTION, SOLUTION INTRAVENOUS; SUBCUTANEOUS at 04:10

## 2022-10-31 RX ADMIN — ACETAMINOPHEN 1000 MG: 500 TABLET ORAL at 10:10

## 2022-10-31 RX ADMIN — HYDROMORPHONE HYDROCHLORIDE 0.2 MG: 1 INJECTION, SOLUTION INTRAMUSCULAR; INTRAVENOUS; SUBCUTANEOUS at 10:10

## 2022-10-31 RX ADMIN — ERYTHROMYCIN LACTOBIONATE 250 MG: 500 INJECTION, POWDER, LYOPHILIZED, FOR SOLUTION INTRAVENOUS at 12:10

## 2022-10-31 RX ADMIN — ACETAMINOPHEN 650 MG: 325 TABLET ORAL at 09:10

## 2022-10-31 RX ADMIN — LEVETIRACETAM 500 MG: 500 TABLET, FILM COATED ORAL at 09:10

## 2022-10-31 RX ADMIN — SODIUM CHLORIDE: 0.9 INJECTION, SOLUTION INTRAVENOUS at 02:10

## 2022-10-31 RX ADMIN — INSULIN ASPART 5 UNITS: 100 INJECTION, SOLUTION INTRAVENOUS; SUBCUTANEOUS at 07:10

## 2022-10-31 RX ADMIN — PANTOPRAZOLE SODIUM 40 MG: 40 TABLET, DELAYED RELEASE ORAL at 06:10

## 2022-10-31 RX ADMIN — ERYTHROMYCIN LACTOBIONATE 250 MG: 500 INJECTION, POWDER, LYOPHILIZED, FOR SOLUTION INTRAVENOUS at 07:10

## 2022-10-31 RX ADMIN — ONDANSETRON 4 MG: 2 INJECTION INTRAMUSCULAR; INTRAVENOUS at 09:10

## 2022-10-31 RX ADMIN — DEXTROSE 125 ML: 10 SOLUTION INTRAVENOUS at 04:10

## 2022-10-31 RX ADMIN — HYDROCODONE BITARTRATE AND ACETAMINOPHEN 1 TABLET: 5; 325 TABLET ORAL at 10:10

## 2022-10-31 RX ADMIN — INSULIN ASPART 3 UNITS: 100 INJECTION, SOLUTION INTRAVENOUS; SUBCUTANEOUS at 07:10

## 2022-10-31 RX ADMIN — HEPARIN SODIUM 1000 UNITS: 1000 INJECTION, SOLUTION INTRAVENOUS; SUBCUTANEOUS at 04:10

## 2022-10-31 RX ADMIN — ONDANSETRON 4 MG: 2 INJECTION INTRAMUSCULAR; INTRAVENOUS at 10:10

## 2022-10-31 NOTE — PLAN OF CARE
Problem: Adult Inpatient Plan of Care  Goal: Plan of Care Review  Outcome: Ongoing, Progressing     Problem: Fall Injury Risk  Goal: Absence of Fall and Fall-Related Injury  Outcome: Ongoing, Progressing  Pt free of fall this shift.     Problem: Pain Acute  Goal: Acceptable Pain Control and Functional Ability  Outcome: Ongoing, Progressing  Pain assessed and pt c/o pain at the upper abdomen; prn po Norco administered and pt verbalized moderate relief of pain.    Will continue to monitor pt.

## 2022-10-31 NOTE — PLAN OF CARE
Problem: Adult Inpatient Plan of Care  Goal: Plan of Care Review  Outcome: Ongoing, Progressing  Goal: Patient-Specific Goal (Individualized)  Outcome: Ongoing, Progressing  Goal: Absence of Hospital-Acquired Illness or Injury  Outcome: Ongoing, Progressing  Goal: Optimal Comfort and Wellbeing  Outcome: Ongoing, Progressing  Goal: Readiness for Transition of Care  Outcome: Ongoing, Progressing     Problem: Diabetes Comorbidity  Goal: Blood Glucose Level Within Targeted Range  Outcome: Ongoing, Progressing     Problem: Fluid and Electrolyte Imbalance (Acute Kidney Injury/Impairment)  Goal: Fluid and Electrolyte Balance  Outcome: Ongoing, Progressing       Pt aaox4 with episodes of crying throughout shift r/t abd pain. Pain subsided with prn pain medications. No UOP during shift. Unable to start 24hr collection. Pt is free of/falls injuries. Safety measures maintained.

## 2022-10-31 NOTE — PROGRESS NOTES
[facility] - Intensive Care ([address] Medicine  Progress Note    Patient Name: Tabby Howard  MRN: [id number]  Patient Class: IP- Inpatient   Admission Date: 10/29/2022  Length of Stay: 2 days  Attending Physician: Frantz Rodríguez MD  Primary Care Provider: Primary Doctor No        Subjective:     Principal Problem:Vision loss, right eye        HPI:  32-year-old female with a history of end-stage renal disease on hemodialysis, diabetes, heart failure, gastroesophageal reflux disease, sickle cell trait, recent C diff, and hypertension admitted to [facility] on October 26 with chest discomfort, dyspnea, and blood tinged sputum.  She has chronic diffuse abdominal and back pain.  She had no fever or chills and no diarrhea.  She was admitted with volume overload, chest pain, and hyponatremia.  She underwent hemodialysis, and she received packed red blood cells.  Troponin was elevated during her stay.  Imaging studies were consistent with, but not diagnostic of, amyloid.  On October 29, she told the provider that she had several days of right eye vision loss.  She was not able to see shapes and everything appeared black.  She was able to see light shined in her eye.  She also noted unusual shaves consistent with floaters in the left eye.  Referring provider spoke with Ophthalmology at [facility].  Requesting transfer to Hospital Medicine at [facility] for further evaluation for vision changes.  With the persistent pain, along with vision changes and abnormal echocardiogram, and more extensive evaluation of her presenting findings may be needed.     October 29:  White blood cells 10.62, hemoglobin 10.1, hematocrit 28.8, platelets 135, sodium 131, potassium 3.2, chloride 98, CO2 25, BUN 39, creatinine 4.2, glucose 139     October 28:  White blood cells 13.35, hemoglobin 7, hematocrit 20.1, platelets 100, BNP greater than 4500  Immunoglobulin light chains, serum protein electrophoresis,  and beta 2 microglobulin pending  -echocardiogram had EF 34%, LV diastolic dysfunction.  Strain study compatible with amyloid but not diagnostic of amyloid.     October 27: Troponin 0.159 (repeat 0.250)  -Echocardiogram had ejection fraction 55%, abnormal septal wall motion.  Mild-to-moderate TR.  Moderate circumferential pericardial effusion.  Mild pulmonary hypertension.     October 26: Quantitative hCG 1.9, COVID negative, influenza negative  -chest x-ray showed bilateral perihilar interstitial lung opacities with enlarged cardiomediastinal silhouette.  -CT of the abdomen and pelvis noted opacities at both lung bases suggesting volume overload and/or interstitial pulmonary edema with superimposed multifocal viral or atypical pneumonia or nonspecific pneumonitis not excluded.  Small right pleural effusion and moderate-sized pericardial effusion.  Trace ascites and anasarca.       Bedside EVAL  -Patient is in stretcher, moaning on entry to room, she reports having ongoing back pain and abdominal discomfort, eyes closed in a lateral decubitus position.   On discussion of her vision complaints, she states a few days ago vision from right eye became blurred and then states suddenly she cannot see out of the right eye.  She denies any pain in the right eye.   Reports blurred or decreased vision in the right eye is worse than left.     On review of care everywhere records [facility], last note Feb 2022 pt with diagnosis of Ichsemic retinopathy Right >Left, plan in note to optimize her chronic conditions of diabetes-glycemic control, anti-hypertensive management, and w/u of her CKD.   There are subsequent notes that patient missed f/u in March/April/may   On questioning re; state of her vision prior to hospitalization or if she had chronic visual deficits, patient states no.     Visual acuity in Feb 2022   Right eye 20/200 and Left Eye 20/70.    Patient has no corrective lenses on during  examination      Overview/Hospital Course:  Evaluated by ophthalmology, no further inpatient intervention. Significant hyperglycemia to 531 on 10/31, SSI increased to medium dose, mealtime aspart increased to 4U. Patient's back and stomach pain controlled with Tylenol, gabapentin, hydrocodone-acetaminophen, and IV dilaudid for breakthrough pain.      Interval History: Patient is seated comfortably eating breakfast. Reports pain is better controlled. No changes in vision today.    Review of Systems   Constitutional:  Negative for chills and fever.   HENT:  Negative for rhinorrhea and sore throat.    Eyes:  Positive for visual disturbance.   Respiratory:  Negative for cough and shortness of breath.    Cardiovascular:  Negative for chest pain and leg swelling.   Gastrointestinal:  Positive for abdominal pain and nausea. Negative for diarrhea and vomiting.   Genitourinary:  Negative for dysuria.   Musculoskeletal:  Positive for back pain.   Skin:  Negative for rash.   Neurological:  Negative for light-headedness and headaches.   Psychiatric/Behavioral:  Negative for confusion.    Objective:     Vital Signs (Most Recent):  Temp: 98.4 °F (36.9 °C) (10/31/22 1214)  Pulse: 86 (10/31/22 1214)  Resp: 18 (10/31/22 1214)  BP: 105/68 (10/31/22 1214)  SpO2: 95 % (10/31/22 1214)   Vital Signs (24h Range):  Temp:  [97.6 °F (36.4 °C)-98.7 °F (37.1 °C)] 98.4 °F (36.9 °C)  Pulse:  [70-92] 86  Resp:  [14-20] 18  SpO2:  [94 %-98 %] 95 %  BP: ()/(53-73) 105/68     Weight: 55.2 kg (121 lb 11.1 oz)  Body mass index is 22.26 kg/m².    Intake/Output Summary (Last 24 hours) at 10/31/2022 1444  Last data filed at 10/31/2022 1400  Gross per 24 hour   Intake 929 ml   Output 0 ml   Net 929 ml        Physical Exam  HENT:      Head: Normocephalic and atraumatic.      Right Ear: External ear normal.      Left Ear: External ear normal.      Nose: Nose normal.      Mouth/Throat:      Mouth: Mucous membranes are moist.      Pharynx: Oropharynx is  clear.   Eyes:      General: No scleral icterus.     Extraocular Movements: Extraocular movements intact.      Conjunctiva/sclera: Conjunctivae normal.   Cardiovascular:      Rate and Rhythm: Normal rate and regular rhythm.   Pulmonary:      Effort: Pulmonary effort is normal.      Breath sounds: Normal breath sounds. No wheezing or rales.   Abdominal:      General: Abdomen is flat. Bowel sounds are normal. There is no distension.      Palpations: Abdomen is soft.      Tenderness: There is no abdominal tenderness.   Musculoskeletal:         General: Normal range of motion.      Cervical back: Normal range of motion.   Skin:     General: Skin is warm and dry.   Neurological:      General: No focal deficit present.      Mental Status: She is alert.   Psychiatric:         Mood and Affect: Mood normal.         Behavior: Behavior normal.       Significant Labs: All pertinent labs within the past 24 hours have been reviewed.    Significant Imaging: I have reviewed all pertinent imaging results/findings within the past 24 hours.      Assessment/Plan:      * Vision loss, right eye  S/p MRI brain and CTA head/neck w/o acute abnormality. Transferred for Ophthalmology consultation - recommending outpatient follow-up. Per note visual changes likely due to proliferative retinopathy in setting of ischemic retinopathy of unknown origin seen on exam 2/2022. [facility] records indicate hx of chronic ischemic retinopathy right eye worse than left.         Chronic abdominal pain  S/p CTA Chest abd/pelvis, no acute findings to explain abdominal pain. prior hospital med notes comment on distractibility on exam and concern for pain seeking behavior    -concern for gastroparesis - continue erythromycin   -multimodal pain control - tylenol 650 q8, gabapentin 100mg TID, hydrocodone-acetaminophen q6h PRN, IV dilaudid 0.2mg q6h PRN for breakthrough pain.       Anemia, acute on chronic  required 2 PRBC transfusions @ [facility], on 10/26, 10/28 -  Hgb improved to 11     -Nephrology ordering EPO for patient     Hyponatremia  Sodium 123, corrected to 131 on 10/31 2/2 hyperglycemia to 500s. Corrected sodium reveals mild hyponatremia. Patient is asymptomatic.        Volume overload with pulmonary edema  received daily HD treatments on 10/27-28-29 prior to transfer due to volume overload. lungs CTA and on room air, lying supine in bed with normal work of breathing.       Primary hypertension  -Re-started home amlodipine 10,  Toprol 50 BID  -hydralazine 5mg IV PRN for SBP>185  -discontinued home hydralazine PO 10/31 d/t hypotension       ESRD (end stage renal disease)  Nephrology consultation to continue HD, her outpatient schedule is Tu/Th/Sa. Euvolemic      Pericardial effusion  S/p new ECHO and evaluated by Cardiology Dr. Hart @ [facility], effusion is hemodynamically INSIGNIFICANT, unchanged in size at this time. there is concern for possible infiltrative Cardiomyopathy though      Seizure  Patient with hx of seizures, on home Keppra.    -continue Keppra 500mg BID      Sickle cell trait        Gastroparesis  CT notes retained stomach contents. Prior notes indicate c/o of nausea/vomiting. Cannot take Reglan d/t hx of seizures. Has been on opioids, will need to be weaned.    - continue PRN phenergan  - scheduled zofran 4mg IV TID  - scheduled Erythryomycin for gastroparesis flare       Type 2 diabetes mellitus with chronic kidney disease on chronic dialysis, with long-term current use of insulin  Patient's FSGs are controlled on current medication regimen. Regimen increased to aspart 4U and MDSSI 10/31 for hyperglycemia to 500s.    Last A1c reviewed-   Lab Results   Component Value Date    HGBA1C 6.2 08/24/2022     Most recent fingerstick glucose reviewed-   Recent Labs   Lab 10/30/22  2133 10/31/22  0742 10/31/22  1007 10/31/22  1151   POCTGLUCOSE 313* 410* 215* 89     Current correctional scale  Medium  Maintain anti-hyperglycemic dose as follows-    Antihyperglycemics (From admission, onward)    Start     Stop Route Frequency Ordered    10/31/22 2100  insulin detemir U-100 pen 10 Units         -- SubQ Nightly 10/31/22 1329    10/31/22 1645  insulin aspart U-100 pen 4 Units         -- SubQ 3 times daily with meals 10/31/22 1330    10/31/22 0754  insulin aspart U-100 pen 0-10 Units         -- SubQ Before meals & nightly PRN 10/31/22 0755        Hold Oral hypoglycemics while patient is in the hospital.      VTE Risk Mitigation (From admission, onward)         Ordered     apixaban tablet 5 mg  2 times daily         10/29/22 1930     IP VTE HIGH RISK PATIENT  Once         10/29/22 1930     Place sequential compression device  Until discontinued         10/29/22 1930     Reason for No Pharmacological VTE Prophylaxis  Once        Question:  Reasons:  Answer:  Already adequately anticoagulated on oral Anticoagulants    10/29/22 1930                Discharge Planning   TATIANA: 11/2/2022     Code Status: Full Code   Is the patient medically ready for discharge?: No    Reason for patient still in hospital (select all that apply): Patient trending condition and Pending disposition  Discharge Plan A: Home   Discharge Delays: None known at this time              MICHELLE MAC MD  Department of Hospital Medicine   [facility] - Intensive Care ([place])

## 2022-10-31 NOTE — ASSESSMENT & PLAN NOTE
CT notes retained stomach contents. Prior notes indicate c/o of nausea/vomiting. Cannot take Reglan d/t hx of seizures. Has been on opioids, will need to be weaned.    - continue PRN phenergan  - scheduled zofran 4mg IV TID  - scheduled Erythryomycin for gastroparesis flare

## 2022-10-31 NOTE — PLAN OF CARE
- ischemic retinopathy likely due to uncontrolled diabetes/htn. A1c now normalized s/p dialysis.   - bscan consistent diffuse vit heme R eye with posterior hyaloid thickening vs fibrotic scarring and preretinal heme. Likely not detached though would not .   - Will defer intervention while awaiting Vit heme to clear and pt to be healthy enough to be seen in outpatient clinic.   - On discharge will set patient up with visit in retina clinic within 1-2 weeks   - Please message on call resident to set patient up with appointment when pt is nearing discharge   - VH precautions per last note: head up positioning, no heavy lifting/straining, avoiding anticoagulant/antiplatelet as able      Ophthalmology will sign off please page on call ophtho resident if further eval needed.       Discussed with dr Vieira and Dr Garay retina attending.       Jairon Renteria MD   PGY-2 [employer] ophthalmology

## 2022-10-31 NOTE — HPI
33 y/o F w/ hx of ESRD on HD, DM, HFpEF, GERD, suspected gastroparesis, sickle cell trait, HTN, with a recent episode of C diff who was admitted for [facility] on 10/26 for blood tinged sputum, chest pain and back pain, thought to be related to volume overload, she was dialyzed. Her TTE was concerning for possible amyloidosis.      On 10/29 she reported several days of R eye vision loss, her vision was diminished to light perception in her R eye. She was then transferred to [facility] for ophthalmology evaluation. Ohpthalmology believes that she has vitreous hemorrhage likely due to proliferative retinopathy in the setting of her ischemic retinopathy of unknown origin which occurred in 2/2022.    Patient currently dialyzes at [facility] in [place] on T/Th/Sat. However, patient explained that she is transitioning to a new unit soon, and that she will be on a MWF schedule. She cannot recall the name of her new unit. She is anuric at baseline. Last dialyzed on 10/29/22. At time of nephrology evaluation, she denies having SOB, fever, cough, aches, chills, N/V/D, abdominal pain, and swelling to her lower extremities. Nephrology was consulted for management of ESRD/HD.

## 2022-10-31 NOTE — NURSING
Report received from JARET Trujillo RN. Patient arrived to [facility] via wheelchair. AAOx4. HD Tx initiated via RIJ catheter.

## 2022-10-31 NOTE — ASSESSMENT & PLAN NOTE
S/p new ECHO and evaluated by Cardiology Dr. Hart @ [facility], effusion is hemodynamically INSIGNIFICANT, unchanged in size at this time. there is concern for possible infiltrative Cardiomyopathy though

## 2022-10-31 NOTE — SUBJECTIVE & OBJECTIVE
Interval History: Patient is seated comfortably eating breakfast. Reports pain is better controlled. No changes in vision today.    Review of Systems   Constitutional:  Negative for chills and fever.   HENT:  Negative for rhinorrhea and sore throat.    Eyes:  Positive for visual disturbance.   Respiratory:  Negative for cough and shortness of breath.    Cardiovascular:  Negative for chest pain and leg swelling.   Gastrointestinal:  Positive for abdominal pain and nausea. Negative for diarrhea and vomiting.   Genitourinary:  Negative for dysuria.   Musculoskeletal:  Positive for back pain.   Skin:  Negative for rash.   Neurological:  Negative for light-headedness and headaches.   Psychiatric/Behavioral:  Negative for confusion.    Objective:     Vital Signs (Most Recent):  Temp: 98.4 °F (36.9 °C) (10/31/22 1214)  Pulse: 86 (10/31/22 1214)  Resp: 18 (10/31/22 1214)  BP: 105/68 (10/31/22 1214)  SpO2: 95 % (10/31/22 1214)   Vital Signs (24h Range):  Temp:  [97.6 °F (36.4 °C)-98.7 °F (37.1 °C)] 98.4 °F (36.9 °C)  Pulse:  [70-92] 86  Resp:  [14-20] 18  SpO2:  [94 %-98 %] 95 %  BP: ()/(53-73) 105/68     Weight: 55.2 kg (121 lb 11.1 oz)  Body mass index is 22.26 kg/m².    Intake/Output Summary (Last 24 hours) at 10/31/2022 1444  Last data filed at 10/31/2022 1400  Gross per 24 hour   Intake 929 ml   Output 0 ml   Net 929 ml        Physical Exam  HENT:      Head: Normocephalic and atraumatic.      Right Ear: External ear normal.      Left Ear: External ear normal.      Nose: Nose normal.      Mouth/Throat:      Mouth: Mucous membranes are moist.      Pharynx: Oropharynx is clear.   Eyes:      General: No scleral icterus.     Extraocular Movements: Extraocular movements intact.      Conjunctiva/sclera: Conjunctivae normal.   Cardiovascular:      Rate and Rhythm: Normal rate and regular rhythm.   Pulmonary:      Effort: Pulmonary effort is normal.      Breath sounds: Normal breath sounds. No wheezing or rales.    Abdominal:      General: Abdomen is flat. Bowel sounds are normal. There is no distension.      Palpations: Abdomen is soft.      Tenderness: There is no abdominal tenderness.   Musculoskeletal:         General: Normal range of motion.      Cervical back: Normal range of motion.   Skin:     General: Skin is warm and dry.   Neurological:      General: No focal deficit present.      Mental Status: She is alert.   Psychiatric:         Mood and Affect: Mood normal.         Behavior: Behavior normal.       Significant Labs: All pertinent labs within the past 24 hours have been reviewed.    Significant Imaging: I have reviewed all pertinent imaging results/findings within the past 24 hours.

## 2022-10-31 NOTE — ASSESSMENT & PLAN NOTE
Patient's FSGs are controlled on current medication regimen. Regimen increased to aspart 4U and MDSSI 10/31 for hyperglycemia to 500s.    Last A1c reviewed-   Lab Results   Component Value Date    HGBA1C 6.2 08/24/2022     Most recent fingerstick glucose reviewed-   Recent Labs   Lab 10/30/22  2133 10/31/22  0742 10/31/22  1007 10/31/22  1151   POCTGLUCOSE 313* 410* 215* 89     Current correctional scale  Medium  Maintain anti-hyperglycemic dose as follows-   Antihyperglycemics (From admission, onward)    Start     Stop Route Frequency Ordered    10/31/22 2100  insulin detemir U-100 pen 10 Units         -- SubQ Nightly 10/31/22 1329    10/31/22 1645  insulin aspart U-100 pen 4 Units         -- SubQ 3 times daily with meals 10/31/22 1330    10/31/22 0754  insulin aspart U-100 pen 0-10 Units         -- SubQ Before meals & nightly PRN 10/31/22 0755        Hold Oral hypoglycemics while patient is in the hospital.

## 2022-10-31 NOTE — ASSESSMENT & PLAN NOTE
S/p CTA Chest abd/pelvis, no acute findings to explain abdominal pain. prior hospital med notes comment on distractibility on exam and concern for pain seeking behavior    -concern for gastroparesis - continue erythromycin   -multimodal pain control - tylenol 650 q8, gabapentin 100mg TID, hydrocodone-acetaminophen q6h PRN, IV dilaudid 0.2mg q6h PRN for breakthrough pain.

## 2022-10-31 NOTE — NURSING
BG 65 Dextrose 10% bolus 125 ml given during dialysis.     Offered orange juice and crackers to the patient, she accepted.     Patient denies complaints. Alert and awake.

## 2022-10-31 NOTE — ASSESSMENT & PLAN NOTE
Outpatient HD Information:    -Outpatient HD unit: Goes to [facility] but states she is supposed to start going to a new unit soon. Patient can't recall which unit.   -HD tx days: Has been T/Th/Sat, but states she is transitioning to MWF   -HD tx time: 3hrs  -Last HD tx prior to presentation: 10/29/22  -HD access: R IJ TDC  -HD modality: iHD   -Residual urine: Anuric       Plan/Recommendations:    -HD today for volume management and metabolic clearance    -Renal diet, if not NPO   -Strict I/O's and daily weights  -Daily renal function panels   -Renally dose meds

## 2022-10-31 NOTE — ASSESSMENT & PLAN NOTE
received daily HD treatments on 10/27-28-29 prior to transfer due to volume overload. lungs CTA and on room air, lying supine in bed with normal work of breathing.

## 2022-10-31 NOTE — ASSESSMENT & PLAN NOTE
required 2 PRBC transfusions @ [facility] , on 10/26, 10/28 - Hgb improved to 11     -Nephrology ordering EPO for patient

## 2022-10-31 NOTE — SUBJECTIVE & OBJECTIVE
Past Medical History:   Diagnosis Date    CKD (chronic kidney disease), stage IV 2022    Diabetes mellitus due to underlying condition with unspecified complications 2022    Gastroparesis 2022    Heart failure with preserved ejection fraction 2022    EF 55% on 3/22    History of gastroesophageal reflux (GERD)     History of supraventricular tachycardia     Hyperkalemia 2022    Hypertensive emergency 2022    Sickle cell trait 2022    Type 2 diabetes mellitus        Past Surgical History:   Procedure Laterality Date     SECTION      x 3    COLONOSCOPY      COLONOSCOPY N/A 2022    Procedure: COLONOSCOPY;  Surgeon: Jagdeep Cedeno MD;  Location: [facility];  Service: Endoscopy;  Laterality: N/A;    ESOPHAGOGASTRODUODENOSCOPY N/A 10/18/2019    Procedure: ESOPHAGOGASTRODUODENOSCOPY (EGD);  Surgeon: Gianluca Mendez MD;  Location: [facility];  Service: Endoscopy;  Laterality: N/A;    ESOPHAGOGASTRODUODENOSCOPY N/A 2022    Procedure: EGD (ESOPHAGOGASTRODUODENOSCOPY);  Surgeon: Micky Paredes III, MD;  Location: [facility];  Service: Endoscopy;  Laterality: N/A;    LAPAROSCOPIC CHOLECYSTECTOMY N/A 2022    Procedure: CHOLECYSTECTOMY, LAPAROSCOPIC;  Surgeon: Grey Perez MD;  Location: [facility];  Service: General;  Laterality: N/A;    PLACEMENT OF DUAL-LUMEN VASCULAR CATHETER Left 2022    Procedure: INSERTION-CATHETER-JOSEPH;  Surgeon: Dionte Gan MD;  Location: [facility] OR;  Service: General;  Laterality: Left;    PLACEMENT OF DUAL-LUMEN VASCULAR CATHETER Right 2022    Procedure: INSERTION-CATHETER-Hemosplit;  Surgeon: Dionte Gan MD;  Location: [facility] OR;  Service: General;  Laterality: Right;       Review of patient's allergies indicates:   Allergen Reactions    Penicillins Hives     Current Facility-Administered Medications   Medication Frequency    0.9%  NaCl infusion Once    acetaminophen tablet 1,000 mg Daily    acetaminophen tablet 650 mg Q4H  PRN    amLODIPine tablet 10 mg Daily    apixaban tablet 5 mg BID    dextrose 10% bolus 125 mL PRN    dextrose 10% bolus 250 mL PRN    [START ON 11/1/2022] epoetin teresa injection 8,850 Units Every Tues, Thurs, Sat    erythromycin (ERYTHROCIN) 250 mg in sodium chloride 0.9% 100 mL IVPB Q8H    gabapentin capsule 100 mg TID    glucagon (human recombinant) injection 1 mg PRN    glucose chewable tablet 16 g PRN    glucose chewable tablet 24 g PRN    hydrALAZINE injection 5 mg Q8H PRN    hydrALAZINE tablet 100 mg Q8H    HYDROcodone-acetaminophen 5-325 mg per tablet 1 tablet Q6H PRN    HYDROmorphone injection 0.5 mg Q6H PRN    insulin aspart U-100 pen 0-10 Units QID (AC + HS) PRN    insulin aspart U-100 pen 5 Units TIDWM    insulin detemir U-100 pen 15 Units QHS    levETIRAcetam tablet 500 mg BID    melatonin tablet 6 mg Nightly PRN    metoprolol succinate (TOPROL-XL) 24 hr split tablet 50 mg BID    mupirocin 2 % ointment BID    naloxone 0.4 mg/mL injection 0.02 mg PRN    ondansetron injection 4 mg TID    pantoprazole EC tablet 40 mg Before breakfast    promethazine (PHENERGAN) 25 mg in dextrose 5 % 50 mL IVPB Q6H PRN    sodium chloride 0.9% flush 10 mL Q6H PRN     Family History       Problem Relation (Age of Onset)    Diabetes Mother, Father          Tobacco Use    Smoking status: Never    Smokeless tobacco: Never   Substance and Sexual Activity    Alcohol use: Not Currently    Drug use: No    Sexual activity: Not Currently     Partners: Male     Birth control/protection: I.U.D.     Review of Systems   Constitutional: Negative.    HENT: Negative.     Eyes:  Positive for visual disturbance.   Respiratory: Negative.     Cardiovascular: Negative.    Gastrointestinal: Negative.    Genitourinary:  Positive for decreased urine volume (anuric at baseline).   Musculoskeletal: Negative.    Skin: Negative.    Neurological: Negative.    Psychiatric/Behavioral: Negative.     Objective:     Vital Signs (Most Recent):  Temp: 98.7 °F  (37.1 °C) (10/31/22 0735)  Pulse: 84 (10/31/22 0735)  Resp: 18 (10/31/22 0735)  BP: 119/73 (10/31/22 0735)  SpO2: 96 % (10/31/22 0735)  O2 Device (Oxygen Therapy): room air (10/31/22 0735) Vital Signs (24h Range):  Temp:  [97.1 °F (36.2 °C)-98.7 °F (37.1 °C)] 98.7 °F (37.1 °C)  Pulse:  [70-92] 84  Resp:  [14-20] 18  SpO2:  [94 %-98 %] 96 %  BP: ()/(53-73) 119/73     Weight: 55.2 kg (121 lb 11.1 oz) (10/31/22 0735)  Body mass index is 22.26 kg/m².  Body surface area is 1.55 meters squared.    I/O last 3 completed shifts:  In: 1110 [P.O.:1110]  Out: 1 [Urine:1]    Physical Exam  Constitutional:       Appearance: Normal appearance.   HENT:      Head: Normocephalic and atraumatic.      Nose: Nose normal.      Mouth/Throat:      Mouth: Mucous membranes are moist.      Pharynx: Oropharynx is clear.   Eyes:      Conjunctiva/sclera: Conjunctivae normal.   Cardiovascular:      Rate and Rhythm: Normal rate and regular rhythm.      Comments: R IJ TDC   Pulmonary:      Effort: Pulmonary effort is normal.      Breath sounds: Normal breath sounds.   Abdominal:      General: Abdomen is flat.   Musculoskeletal:         General: Normal range of motion.      Cervical back: Normal range of motion and neck supple.   Skin:     General: Skin is warm and dry.   Neurological:      General: No focal deficit present.      Mental Status: She is alert and oriented to person, place, and time.   Psychiatric:         Mood and Affect: Mood normal.         Behavior: Behavior normal.       Significant Labs:  CBC:   Recent Labs   Lab 10/31/22  0530   WBC 7.43   RBC 3.39*   HGB 10.1*   HCT 29.7*   *   MCV 88   MCH 29.8   MCHC 34.0     CMP:   Recent Labs   Lab 10/31/22  0530   *   CALCIUM 7.7*   ALBUMIN 2.5*   PROT 5.5*   *   K 4.5   CO2 12*      BUN 32*   CREATININE 4.8*   ALKPHOS 106   ALT 19   AST 13   BILITOT 0.9     All labs within the past 24 hours have been reviewed.

## 2022-10-31 NOTE — ASSESSMENT & PLAN NOTE
-Re-started home amlodipine 10,  Toprol 50 BID  -hydralazine 5mg IV PRN for SBP>185  -discontinued home hydralazine PO 10/31 d/t hypotension

## 2022-10-31 NOTE — ASSESSMENT & PLAN NOTE
Hyponatremia likely hypervolemic hyponatremia in setting of ESRD. Patient is not symptomatic. Sodium 123 today, and 130 yesterday.     -130 sodium bath with HD today   -1.5L/day fluid restriction

## 2022-10-31 NOTE — PLAN OF CARE
Pt dialysis center is [address] [phone]    Pt chair time is 2 pm M, W, Fri    Sw provided pt with information for [facility] to assist with her vision impairment      Angelina Godwin CD, MSW, LMSW, RSW   Case Management  [facility]  Email: [email or web address]

## 2022-10-31 NOTE — ASSESSMENT & PLAN NOTE
S/p MRI brain and CTA head/neck w/o acute abnormality. Transferred for Ophthalmology consultation - recommending outpatient follow-up. Per note visual changes likely due to proliferative retinopathy in setting of ischemic retinopathy of unknown origin seen on exam 2/2022. [facility] records indicate hx of chronic ischemic retinopathy right eye worse than left.

## 2022-10-31 NOTE — CONSULTS
[facility] ([place] [address])  Nephrology  Consult Note    Patient Name: Tabby Howard  MRN: [id number]  Admission Date: 10/29/2022  Hospital Length of Stay: 2 days  Attending Provider: Frantz Rodríguez MD   Primary Care Physician: Primary Doctor No  Principal Problem:Vision loss, right eye    Inpatient consult to Nephrology  Consult performed by: Henri Montoya NP  Consult ordered by: Morgan Whelan MD  Reason for consult: ESRD on HD         Subjective:     HPI: 33 y/o F w/ hx of ESRD on HD, DM, HFpEF, GERD, suspected gastroparesis, sickle cell trait, HTN, with a recent episode of C diff who was admitted for [facility] on 10/26 for blood tinged sputum, chest pain and back pain, thought to be related to volume overload, she was dialyzed. Her TTE was concerning for possible amyloidosis.      On 10/29 she reported several days of R eye vision loss, her vision was diminished to light perception in her R eye. She was then transferred to [facility] [place] evaluation. [facility] believes that she has vitreous hemorrhage likely due to proliferative retinopathy in the setting of her ischemic retinopathy of unknown origin which occurred in 2/2022.    Patient currently dialyzes at [facility] in [place] on T/Th/Sat. However, patient explained that she is transitioning to a new unit soon, and that she will be on a MWF schedule. She cannot recall the name of her new unit. She is anuric at baseline. Last dialyzed on 10/29/22. At time of nephrology evaluation, she denies having SOB, fever, cough, aches, chills, N/V/D, abdominal pain, and swelling to her lower extremities. Nephrology was consulted for management of ESRD/HD.       Past Medical History:   Diagnosis Date    CKD (chronic kidney disease), stage IV 4/12/2022    Diabetes mellitus due to underlying condition with unspecified complications 4/12/2022    Gastroparesis 4/12/2022    Heart failure with preserved ejection fraction 4/12/2022    EF  55% on 3/22    History of gastroesophageal reflux (GERD)     History of supraventricular tachycardia     Hyperkalemia 2022    Hypertensive emergency 2022    Sickle cell trait 2022    Type 2 diabetes mellitus        Past Surgical History:   Procedure Laterality Date     SECTION      x 3    COLONOSCOPY      COLONOSCOPY N/A 2022    Procedure: COLONOSCOPY;  Surgeon: Jagdeep Cedeno MD;  [facility]: [facility];  Service: Endoscopy;  Laterality: N/A;    ESOPHAGOGASTRODUODENOSCOPY N/A 10/18/2019    Procedure: ESOPHAGOGASTRODUODENOSCOPY (EGD);  Surgeon: Gianluca Mendez MD;  Location: [facility];  Service: Endoscopy;  Laterality: N/A;    ESOPHAGOGASTRODUODENOSCOPY N/A 2022    Procedure: EGD (ESOPHAGOGASTRODUODENOSCOPY);  Surgeon: Micky Paredes III, MD;  [facility]: [facility];  Service: Endoscopy;  Laterality: N/A;    LAPAROSCOPIC CHOLECYSTECTOMY N/A 2022    Procedure: CHOLECYSTECTOMY, LAPAROSCOPIC;  Surgeon: Grey Perez MD;  Location: [facility] OR;  Service: General;  Laterality: N/A;    PLACEMENT OF DUAL-LUMEN VASCULAR CATHETER Left 2022    Procedure: INSERTION-CATHETER-JOSEPH;  Surgeon: Dionte Gan MD;  Location: [facility] OR;  Service: General;  Laterality: Left;    PLACEMENT OF DUAL-LUMEN VASCULAR CATHETER Right 2022    Procedure: INSERTION-CATHETER-Hemosplit;  Surgeon: Dionte Gan MD;  Location: [facility];  Service: General;  Laterality: Right;       Review of patient's allergies indicates:   Allergen Reactions    Penicillins Hives     Current Facility-Administered Medications   Medication Frequency    0.9%  NaCl infusion Once    acetaminophen tablet 1,000 mg Daily    acetaminophen tablet 650 mg Q4H PRN    amLODIPine tablet 10 mg Daily    apixaban tablet 5 mg BID    dextrose 10% bolus 125 mL PRN    dextrose 10% bolus 250 mL PRN    [START ON 2022] epoetin teresa injection 8,850 Units Every Tues, Thurs, Sat    erythromycin (ERYTHROCIN) 250 mg in sodium chloride 0.9%  100 mL IVPB Q8H    gabapentin capsule 100 mg TID    glucagon (human recombinant) injection 1 mg PRN    glucose chewable tablet 16 g PRN    glucose chewable tablet 24 g PRN    hydrALAZINE injection 5 mg Q8H PRN    hydrALAZINE tablet 100 mg Q8H    HYDROcodone-acetaminophen 5-325 mg per tablet 1 tablet Q6H PRN    HYDROmorphone injection 0.5 mg Q6H PRN    insulin aspart U-100 pen 0-10 Units QID (AC + HS) PRN    insulin aspart U-100 pen 5 Units TIDWM    insulin detemir U-100 pen 15 Units QHS    levETIRAcetam tablet 500 mg BID    melatonin tablet 6 mg Nightly PRN    metoprolol succinate (TOPROL-XL) 24 hr split tablet 50 mg BID    mupirocin 2 % ointment BID    naloxone 0.4 mg/mL injection 0.02 mg PRN    ondansetron injection 4 mg TID    pantoprazole EC tablet 40 mg Before breakfast    promethazine (PHENERGAN) 25 mg in dextrose 5 % 50 mL IVPB Q6H PRN    sodium chloride 0.9% flush 10 mL Q6H PRN     Family History       Problem Relation (Age of Onset)    Diabetes Mother, Father          Tobacco Use    Smoking status: Never    Smokeless tobacco: Never   Substance and Sexual Activity    Alcohol use: Not Currently    Drug use: No    Sexual activity: Not Currently     Partners: Male     Birth control/protection: I.U.D.     Review of Systems   Constitutional: Negative.    HENT: Negative.     Eyes:  Positive for visual disturbance.   Respiratory: Negative.     Cardiovascular: Negative.    Gastrointestinal: Negative.    Genitourinary:  Positive for decreased urine volume (anuric at baseline).   Musculoskeletal: Negative.    Skin: Negative.    Neurological: Negative.    Psychiatric/Behavioral: Negative.     Objective:     Vital Signs (Most Recent):  Temp: 98.7 °F (37.1 °C) (10/31/22 0735)  Pulse: 84 (10/31/22 0735)  Resp: 18 (10/31/22 0735)  BP: 119/73 (10/31/22 0735)  SpO2: 96 % (10/31/22 0735)  O2 Device (Oxygen Therapy): room air (10/31/22 0735) Vital Signs (24h Range):  Temp:  [97.1 °F (36.2 °C)-98.7 °F (37.1 °C)] 98.7 °F (37.1  °C)  Pulse:  [70-92] 84  Resp:  [14-20] 18  SpO2:  [94 %-98 %] 96 %  BP: ()/(53-73) 119/73     Weight: 55.2 kg (121 lb 11.1 oz) (10/31/22 0735)  Body mass index is 22.26 kg/m².  Body surface area is 1.55 meters squared.    I/O last 3 completed shifts:  In: 1110 [P.O.:1110]  Out: 1 [Urine:1]    Physical Exam  Constitutional:       Appearance: Normal appearance.   HENT:      Head: Normocephalic and atraumatic.      Nose: Nose normal.      Mouth/Throat:      Mouth: Mucous membranes are moist.      Pharynx: Oropharynx is clear.   Eyes:      Conjunctiva/sclera: Conjunctivae normal.   Cardiovascular:      Rate and Rhythm: Normal rate and regular rhythm.      Comments: R IJ TDC   Pulmonary:      Effort: Pulmonary effort is normal.      Breath sounds: Normal breath sounds.   Abdominal:      General: Abdomen is flat.   Musculoskeletal:         General: Normal range of motion.      Cervical back: Normal range of motion and neck supple.   Skin:     General: Skin is warm and dry.   Neurological:      General: No focal deficit present.      Mental Status: She is alert and oriented to person, place, and time.   Psychiatric:         Mood and Affect: Mood normal.         Behavior: Behavior normal.       Significant Labs:  CBC:   Recent Labs   Lab 10/31/22  0530   WBC 7.43   RBC 3.39*   HGB 10.1*   HCT 29.7*   *   MCV 88   MCH 29.8   MCHC 34.0     CMP:   Recent Labs   Lab 10/31/22  0530   *   CALCIUM 7.7*   ALBUMIN 2.5*   PROT 5.5*   *   K 4.5   CO2 12*      BUN 32*   CREATININE 4.8*   ALKPHOS 106   ALT 19   AST 13   BILITOT 0.9     All labs within the past 24 hours have been reviewed.      Assessment/Plan:     * Vision loss, right eye  -Management per primary team     Anemia of chronic kidney failure, stage 5  -Transfuse for Hg <7.0  -Epo with HD     Hyponatremia  Hyponatremia likely hypervolemic hyponatremia in setting of ESRD. Patient is not symptomatic. Sodium 123 today, and 130 yesterday.      -130 sodium bath with HD today   -1.5L/day fluid restriction       Volume overload with pulmonary edema  -UF with HD for volume management     ESRD (end stage renal disease)  Outpatient HD Information:    -Outpatient HD unit: [facility]es to [facility] but states she is supposed to start going to a new unit soon. Patient can't recall which unit.   -HD tx days: Has been T/Th/Sat, but states she is transitioning to MWF   -HD tx time: 3hrs  -Last HD tx prior to presentation: 10/29/22  -HD access: R IJ TDC  -HD modality: iHD   -Residual urine: Anuric       Plan/Recommendations:    -HD today for volume management and metabolic clearance    -Renal diet, if not NPO   -Strict I/O's and daily weights  -Daily renal function panels   -Renally dose meds        Chronic kidney disease-mineral and bone disorder  -Low phos diet         Thank you for your consult. I will follow-up with patient. Please contact us if you have any additional questions.    Henri Montoya, NP  Nephrology  [facility] ([place])

## 2022-10-31 NOTE — NURSING
3.5 hours HD Tx completed. 2L of fluid removed. Patient tolerated well. Epoetin given as ordered. Blood returned. Catheter locked with Heparin. Clamped and capped. Report given to JARET Trujillo RN.

## 2022-10-31 NOTE — PLAN OF CARE
[facility]  Initial Discharge Assessment       Primary Care Provider: Primary Doctor No    Admission Diagnosis: Volume overload [E87.70]    Admission Date: 10/29/2022  Expected Discharge Date: 11/2/2022    SW spoke with pt regarding initial assessment.  Pt stated she lives with her  daughters aged 14, 13 and 6 yo.  Pt stated that her oldest 2 daughters help her with her clothing but  otherwise she is independent with her ADL's.  Pt stated that she ambulates up 1 flight of stairs to enter into her home and she goes slowly.  Pt stated that she uses a walker with 4 wheels.      Pt is on dialysis and [facility] M, W, Fri, but the clinic has changed and she does not know what clinic she uses now.      Pt goes to [facility] for her primary care    Discharge Barriers Identified: None    Payor: MEDICAID / Plan: [place] / Product Type: Managed Medicaid /     Extended Emergency Contact Information  Primary Emergency Contact: Garcia Howard  Mobile Phone: [phone]  Relation: Father  Preferred language: English   needed? No  Secondary Emergency Contact: LeeTanya  Mobile Phone: [phone]  Relation: Step parent  Preferred language: English   needed? No    Discharge Plan A: (P) Home  Discharge Plan B: (P) Home      [facility] [address]  [facility] [address]  Phone: [phone] Fax: [phone]      Initial Assessment (most recent)       Adult Discharge Assessment - 10/31/22 1329          Discharge Assessment    Assessment Type Discharge Planning Assessment (P)      Confirmed/corrected address, phone number and insurance Yes (P)      Confirmed Demographics Correct on Facesheet (P)      Source of Information patient (P)      Reason For Admission Vision loss, right eye (P)      Lives With child(tammy), dependent (P)      Facility Arrived From: home (P)      Do you expect to return  to your current living situation? Yes (P)      Do you have help at home or someone to help you manage your care at home? No (P)      Prior to hospitilization cognitive status: Alert/Oriented;No Deficits (P)      Current cognitive status: Alert/Oriented;No Deficits (P)      Walking or Climbing Stairs Difficulty ambulation difficulty, requires equipment (P)      Mobility Management walker with 4 wheels (P)      Dressing/Bathing Difficulty none;other (see comments) (P)      Dressing/Bathing Management daughters help her dress (P)      Home Accessibility stairs to enter home (P)      Number of Stairs, Main Entrance other (see comments) (P)    1 flight of stairs to enter home    Home Layout Able to live on 1st floor (P)      Equipment Currently Used at Home walker, rolling (P)      Patient currently being followed by outpatient case management? No (P)      Do you currently have service(s) that help you manage your care at home? No (P)      Is the patient taking medications as prescribed? yes (P)      Who is going to help you get home at discharge? pt stated her 3 daughters aged 14, 13 and 7 will help her at home (P)      How do you get to doctors appointments? family or friend will provide (P)      Are you on dialysis? Yes (P)      Dialysis Name and Scheduled days [facility] [address] (?) M, W, Fri (P)      Do you take coumadin? No (P)      Discharge Plan A Home (P)      Discharge Plan B Home (P)      DME Needed Upon Discharge  none (P)      Discharge Plan discussed with: Patient (P)         Relationship/Environment    Name(s) of Who Lives With Patient dependent daughters (P)                       Angelina Godwin CD, MSW, LMSW, RSW   Case Management  [facility]  Email: [email or web address]

## 2022-10-31 NOTE — ASSESSMENT & PLAN NOTE
Sodium 123, corrected to 131 on 10/31 2/2 hyperglycemia to 500s. Corrected sodium reveals mild hyponatremia. Patient is asymptomatic.

## 2022-11-01 VITALS
HEART RATE: 95 BPM | TEMPERATURE: 99 F | WEIGHT: 121.69 LBS | OXYGEN SATURATION: 95 % | RESPIRATION RATE: 20 BRPM | HEIGHT: 62 IN | SYSTOLIC BLOOD PRESSURE: 134 MMHG | DIASTOLIC BLOOD PRESSURE: 79 MMHG | BODY MASS INDEX: 22.39 KG/M2

## 2022-11-01 LAB
ALBUMIN SERPL BCP-MCNC: 2.4 G/DL (ref 3.5–5.2)
ALBUMIN SERPL ELPH-MCNC: 3.1 G/DL (ref 2.9–4.4)
ALBUMIN/GLOB SERPL: 1 {RATIO} (ref 0.7–1.7)
ALP SERPL-CCNC: 105 U/L (ref 55–135)
ALPHA1 GLOB SERPL ELPH-MCNC: 0.3 G/DL (ref 0–0.4)
ALPHA2 GLOB SERPL ELPH-MCNC: 0.6 G/DL (ref 0.4–1)
ALT SERPL W/O P-5'-P-CCNC: 18 U/L (ref 10–44)
ANION GAP SERPL CALC-SCNC: 10 MMOL/L (ref 8–16)
AST SERPL-CCNC: 14 U/L (ref 10–40)
B-GLOBULIN SERPL ELPH-MCNC: 0.9 G/DL (ref 0.7–1.3)
BASOPHILS # BLD AUTO: 0.04 K/UL (ref 0–0.2)
BASOPHILS NFR BLD: 0.6 % (ref 0–1.9)
BILIRUB SERPL-MCNC: 1 MG/DL (ref 0.1–1)
BUN SERPL-MCNC: 26 MG/DL (ref 6–20)
CALCIUM SERPL-MCNC: 7.8 MG/DL (ref 8.7–10.5)
CHLORIDE SERPL-SCNC: 98 MMOL/L (ref 95–110)
CO2 SERPL-SCNC: 23 MMOL/L (ref 23–29)
CREAT SERPL-MCNC: 3.4 MG/DL (ref 0.5–1.4)
DIFFERENTIAL METHOD: ABNORMAL
EOSINOPHIL # BLD AUTO: 0.1 K/UL (ref 0–0.5)
EOSINOPHIL NFR BLD: 1.7 % (ref 0–8)
ERYTHROCYTE [DISTWIDTH] IN BLOOD BY AUTOMATED COUNT: 15.4 % (ref 11.5–14.5)
EST. GFR  (NO RACE VARIABLE): 17.6 ML/MIN/1.73 M^2
GAMMA GLOB SERPL ELPH-MCNC: 1.3 G/DL (ref 0.4–1.8)
GLOBULIN SER CALC-MCNC: 3.1 G/DL (ref 2.2–3.9)
GLUCOSE SERPL-MCNC: 268 MG/DL (ref 70–110)
HCT VFR BLD AUTO: 28.9 % (ref 37–48.5)
HGB BLD-MCNC: 10.4 G/DL (ref 12–16)
IMM GRANULOCYTES # BLD AUTO: 0.06 K/UL (ref 0–0.04)
IMM GRANULOCYTES NFR BLD AUTO: 0.9 % (ref 0–0.5)
KAPPA LC FREE SER-MCNC: 128.7 MG/L (ref 3.3–19.4)
KAPPA LC FREE/LAMBDA FREE SER: 1.12 {RATIO} (ref 0.26–1.65)
LABORATORY COMMENT REPORT: NORMAL
LAMBDA LC FREE SERPL-MCNC: 115.3 MG/L (ref 5.7–26.3)
LYMPHOCYTES # BLD AUTO: 1.3 K/UL (ref 1–4.8)
LYMPHOCYTES NFR BLD: 20.2 % (ref 18–48)
M PROTEIN SERPL ELPH-MCNC: NORMAL G/DL
MAGNESIUM SERPL-MCNC: 1.6 MG/DL (ref 1.6–2.6)
MCH RBC QN AUTO: 29.8 PG (ref 27–31)
MCHC RBC AUTO-ENTMCNC: 36 G/DL (ref 32–36)
MCV RBC AUTO: 83 FL (ref 82–98)
MONOCYTES # BLD AUTO: 0.7 K/UL (ref 0.3–1)
MONOCYTES NFR BLD: 10.8 % (ref 4–15)
NEUTROPHILS # BLD AUTO: 4.3 K/UL (ref 1.8–7.7)
NEUTROPHILS NFR BLD: 65.8 % (ref 38–73)
NRBC BLD-RTO: 0 /100 WBC
PATHOLOGIST INTERPRETATION SPE: NORMAL
PHOSPHATE SERPL-MCNC: 2.3 MG/DL (ref 2.7–4.5)
PLATELET # BLD AUTO: 140 K/UL (ref 150–450)
PMV BLD AUTO: 10.8 FL (ref 9.2–12.9)
POCT GLUCOSE: 188 MG/DL (ref 70–110)
POCT GLUCOSE: 190 MG/DL (ref 70–110)
POTASSIUM SERPL-SCNC: 3.6 MMOL/L (ref 3.5–5.1)
PROT SERPL-MCNC: 5.7 G/DL (ref 6–8.4)
PROT SERPL-MCNC: 6.2 G/DL (ref 6–8.5)
RBC # BLD AUTO: 3.49 M/UL (ref 4–5.4)
SODIUM SERPL-SCNC: 131 MMOL/L (ref 136–145)
WBC # BLD AUTO: 6.57 K/UL (ref 3.9–12.7)

## 2022-11-01 PROCEDURE — 80053 COMPREHEN METABOLIC PANEL: CPT | Performed by: STUDENT IN AN ORGANIZED HEALTH CARE EDUCATION/TRAINING PROGRAM

## 2022-11-01 PROCEDURE — 99239 PR HOSPITAL DISCHARGE DAY,>30 MIN: ICD-10-PCS | Mod: ,,, | Performed by: STUDENT IN AN ORGANIZED HEALTH CARE EDUCATION/TRAINING PROGRAM

## 2022-11-01 PROCEDURE — 63600175 PHARM REV CODE 636 W HCPCS: Mod: JG | Performed by: HOSPITALIST

## 2022-11-01 PROCEDURE — 99239 HOSP IP/OBS DSCHRG MGMT >30: CPT | Mod: ,,, | Performed by: STUDENT IN AN ORGANIZED HEALTH CARE EDUCATION/TRAINING PROGRAM

## 2022-11-01 PROCEDURE — 83735 ASSAY OF MAGNESIUM: CPT | Performed by: STUDENT IN AN ORGANIZED HEALTH CARE EDUCATION/TRAINING PROGRAM

## 2022-11-01 PROCEDURE — 36415 COLL VENOUS BLD VENIPUNCTURE: CPT | Performed by: STUDENT IN AN ORGANIZED HEALTH CARE EDUCATION/TRAINING PROGRAM

## 2022-11-01 PROCEDURE — 25000003 PHARM REV CODE 250

## 2022-11-01 PROCEDURE — 85025 COMPLETE CBC W/AUTO DIFF WBC: CPT | Performed by: STUDENT IN AN ORGANIZED HEALTH CARE EDUCATION/TRAINING PROGRAM

## 2022-11-01 PROCEDURE — 25000003 PHARM REV CODE 250: Performed by: STUDENT IN AN ORGANIZED HEALTH CARE EDUCATION/TRAINING PROGRAM

## 2022-11-01 PROCEDURE — 63600175 PHARM REV CODE 636 W HCPCS: Performed by: STUDENT IN AN ORGANIZED HEALTH CARE EDUCATION/TRAINING PROGRAM

## 2022-11-01 PROCEDURE — 84100 ASSAY OF PHOSPHORUS: CPT | Performed by: STUDENT IN AN ORGANIZED HEALTH CARE EDUCATION/TRAINING PROGRAM

## 2022-11-01 PROCEDURE — 63600175 PHARM REV CODE 636 W HCPCS

## 2022-11-01 PROCEDURE — 25000003 PHARM REV CODE 250: Performed by: HOSPITALIST

## 2022-11-01 RX ORDER — PROMETHAZINE HYDROCHLORIDE 25 MG/1
25 TABLET ORAL EVERY 6 HOURS PRN
Qty: 10 TABLET | Refills: 0 | Status: ON HOLD | OUTPATIENT
Start: 2022-11-01 | End: 2022-11-15 | Stop reason: HOSPADM

## 2022-11-01 RX ORDER — SODIUM CHLORIDE 9 MG/ML
INJECTION, SOLUTION INTRAVENOUS ONCE
Status: DISCONTINUED | OUTPATIENT
Start: 2022-11-02 | End: 2022-11-01 | Stop reason: HOSPADM

## 2022-11-01 RX ORDER — HYDROCODONE BITARTRATE AND ACETAMINOPHEN 5; 325 MG/1; MG/1
1 TABLET ORAL EVERY 6 HOURS PRN
Qty: 16 TABLET | Refills: 0 | Status: ON HOLD | OUTPATIENT
Start: 2022-11-01 | End: 2022-11-15 | Stop reason: HOSPADM

## 2022-11-01 RX ORDER — GABAPENTIN 100 MG/1
100 CAPSULE ORAL 3 TIMES DAILY
Qty: 90 CAPSULE | Refills: 2 | Status: ON HOLD | OUTPATIENT
Start: 2022-11-01 | End: 2022-11-15 | Stop reason: SDUPTHER

## 2022-11-01 RX ORDER — HYDROMORPHONE HYDROCHLORIDE 2 MG/ML
2 INJECTION, SOLUTION INTRAMUSCULAR; INTRAVENOUS; SUBCUTANEOUS ONCE
Status: DISCONTINUED | OUTPATIENT
Start: 2022-11-01 | End: 2022-11-01

## 2022-11-01 RX ORDER — HYDROMORPHONE HYDROCHLORIDE 1 MG/ML
0.2 INJECTION, SOLUTION INTRAMUSCULAR; INTRAVENOUS; SUBCUTANEOUS ONCE
Status: COMPLETED | OUTPATIENT
Start: 2022-11-01 | End: 2022-11-01

## 2022-11-01 RX ORDER — MAGNESIUM SULFATE HEPTAHYDRATE 40 MG/ML
2 INJECTION, SOLUTION INTRAVENOUS ONCE
Status: COMPLETED | OUTPATIENT
Start: 2022-11-01 | End: 2022-11-01

## 2022-11-01 RX ORDER — ONDANSETRON 4 MG/1
4 TABLET, ORALLY DISINTEGRATING ORAL EVERY 8 HOURS PRN
Qty: 12 TABLET | Refills: 0 | Status: ON HOLD | OUTPATIENT
Start: 2022-11-01 | End: 2022-11-15 | Stop reason: SDUPTHER

## 2022-11-01 RX ADMIN — METOPROLOL SUCCINATE 50 MG: 25 TABLET, EXTENDED RELEASE ORAL at 09:11

## 2022-11-01 RX ADMIN — INSULIN ASPART 4 UNITS: 100 INJECTION, SOLUTION INTRAVENOUS; SUBCUTANEOUS at 02:11

## 2022-11-01 RX ADMIN — MAGNESIUM SULFATE 2 G: 2 INJECTION INTRAVENOUS at 10:11

## 2022-11-01 RX ADMIN — ACETAMINOPHEN 650 MG: 325 TABLET ORAL at 06:11

## 2022-11-01 RX ADMIN — ACETAMINOPHEN 650 MG: 325 TABLET ORAL at 02:11

## 2022-11-01 RX ADMIN — APIXABAN 5 MG: 5 TABLET, FILM COATED ORAL at 09:11

## 2022-11-01 RX ADMIN — ERYTHROMYCIN LACTOBIONATE 250 MG: 500 INJECTION, POWDER, LYOPHILIZED, FOR SOLUTION INTRAVENOUS at 02:11

## 2022-11-01 RX ADMIN — ONDANSETRON 4 MG: 2 INJECTION INTRAMUSCULAR; INTRAVENOUS at 09:11

## 2022-11-01 RX ADMIN — PANTOPRAZOLE SODIUM 40 MG: 40 TABLET, DELAYED RELEASE ORAL at 06:11

## 2022-11-01 RX ADMIN — ERYTHROMYCIN LACTOBIONATE 250 MG: 500 INJECTION, POWDER, LYOPHILIZED, FOR SOLUTION INTRAVENOUS at 01:11

## 2022-11-01 RX ADMIN — LEVETIRACETAM 500 MG: 500 TABLET, FILM COATED ORAL at 09:11

## 2022-11-01 RX ADMIN — HYDROMORPHONE HYDROCHLORIDE 0.2 MG: 1 INJECTION, SOLUTION INTRAMUSCULAR; INTRAVENOUS; SUBCUTANEOUS at 06:11

## 2022-11-01 RX ADMIN — HYDROCODONE BITARTRATE AND ACETAMINOPHEN 1 TABLET: 5; 325 TABLET ORAL at 10:11

## 2022-11-01 RX ADMIN — GABAPENTIN 100 MG: 100 CAPSULE ORAL at 02:11

## 2022-11-01 RX ADMIN — AMLODIPINE BESYLATE 10 MG: 10 TABLET ORAL at 09:11

## 2022-11-01 RX ADMIN — ONDANSETRON 4 MG: 2 INJECTION INTRAMUSCULAR; INTRAVENOUS at 02:11

## 2022-11-01 RX ADMIN — HYDROMORPHONE HYDROCHLORIDE 0.2 MG: 1 INJECTION, SOLUTION INTRAMUSCULAR; INTRAVENOUS; SUBCUTANEOUS at 11:11

## 2022-11-01 RX ADMIN — GABAPENTIN 100 MG: 100 CAPSULE ORAL at 09:11

## 2022-11-01 RX ADMIN — INSULIN ASPART 4 UNITS: 100 INJECTION, SOLUTION INTRAVENOUS; SUBCUTANEOUS at 09:11

## 2022-11-01 NOTE — ASSESSMENT & PLAN NOTE
Sodium 123, corrected to 131 on 10/31 2/2 hyperglycemia to 500s. Corrected sodium reveals mild hyponatremia. Patient asymptomatic.

## 2022-11-01 NOTE — ASSESSMENT & PLAN NOTE
Continued home amlodipine 10,  Toprol 50 BID  -hydralazine 5mg IV PRN for SBP>185  -discontinued home hydralazine PO 10/31 d/t hypotension

## 2022-11-01 NOTE — ASSESSMENT & PLAN NOTE
received daily HD treatments on 10/27-28-29 prior to transfer due to volume overload. lungs CTA and on room air, lying supine in bed with normal work of breathing. Remained euvolemic

## 2022-11-01 NOTE — DISCHARGE SUMMARY
[facility] - Intensive Care ([place] [address])  [facility] Medicine  Discharge Summary      Patient Name: Tabby Howard  MRN: [id number]  Patient Class: IP- Inpatient  Admission Date: 10/29/2022  Hospital Length of Stay: 3 days  Discharge Date and Time:  11/01/2022 11:42 AM  Attending Physician: Frantz Rodríguez MD   Discharging Provider: MICHELLE MAC MD  Primary Care Provider: Primary Doctor [facility] Medicine Team: [facility] HOSP MED 1 MICHELLE MAC MD    HPI:   32-year-old female with a history of end-stage renal disease on hemodialysis, diabetes, heart failure, gastroesophageal reflux disease, sickle cell trait, recent C diff, and hypertension admitted to [facility] on October 26 with chest discomfort, dyspnea, and blood tinged sputum.  She has chronic diffuse abdominal and back pain.  She had no fever or chills and no diarrhea.  She was admitted with volume overload, chest pain, and hyponatremia.  She underwent hemodialysis, and she received packed red blood cells.  Troponin was elevated during her stay.  Imaging studies were consistent with, but not diagnostic of, amyloid.  On October 29, she told the provider that she had several days of right eye vision loss.  She was not able to see shapes and everything appeared black.  She was able to see light shined in her eye.  She also noted unusual shaves consistent with floaters in the left eye.  Referring provider spoke with Ophthalmology at [facility].  Requesting transfer to Hospital Medicine at [facility] for further evaluation for vision changes.  With the persistent pain, along with vision changes and abnormal echocardiogram, and more extensive evaluation of her presenting findings may be needed.     October 29:  White blood cells 10.62, hemoglobin 10.1, hematocrit 28.8, platelets 135, sodium 131, potassium 3.2, chloride 98, CO2 25, BUN 39, creatinine 4.2, glucose 139     October 28:  White blood cells 13.35, hemoglobin 7, hematocrit 20.1,  platelets 100, BNP greater than 4500  Immunoglobulin light chains, serum protein electrophoresis, and beta 2 microglobulin pending  -echocardiogram had EF 34%, LV diastolic dysfunction.  Strain study compatible with amyloid but not diagnostic of amyloid.     October 27: Troponin 0.159 (repeat 0.250)  -Echocardiogram had ejection fraction 55%, abnormal septal wall motion.  Mild-to-moderate TR.  Moderate circumferential pericardial effusion.  Mild pulmonary hypertension.     October 26: Quantitative hCG 1.9, COVID negative, influenza negative  -chest x-ray showed bilateral perihilar interstitial lung opacities with enlarged cardiomediastinal silhouette.  -CT of the abdomen and pelvis noted opacities at both lung bases suggesting volume overload and/or interstitial pulmonary edema with superimposed multifocal viral or atypical pneumonia or nonspecific pneumonitis not excluded.  Small right pleural effusion and moderate-sized pericardial effusion.  Trace ascites and anasarca.       Bedside EVAL  -Patient is in stretcher, moaning on entry to room, she reports having ongoing back pain and abdominal discomfort, eyes closed in a lateral decubitus position.   On discussion of her vision complaints, she states a few days ago vision from right eye became blurred and then states suddenly she cannot see out of the right eye.  She denies any pain in the right eye.   Reports blurred or decreased vision in the right eye is worse than left.     On review of care everywhere records [facility] clinic visits, last note Feb 2022 pt with diagnosis of Ichsemic retinopathy Right >Left, plan in note to optimize her chronic conditions of diabetes-glycemic control, anti-hypertensive management, and w/u of her CKD.   There are subsequent notes that patient missed f/u in March/April/may   On questioning re; state of her vision prior to hospitalization or if she had chronic visual deficits, patient states no.     Visual acuity in Feb  2022   Right eye 20/200 and Left Eye 20/70.    Patient has no corrective lenses on during examination      * No surgery found *      Hospital Course:   Evaluated by ophthalmology, no further inpatient intervention; vitreous hemorrhage due to ischemic retinopathy, will see in clinic. Significant hyperglycemia to 531 on 10/31, SSI increased to medium dose, mealtime aspart increased to 4U. Patient's back and stomach pain controlled with Tylenol, gabapentin, hydrocodone-acetaminophen, and IV dilaudid for breakthrough pain.       Goals of Care Treatment Preferences:  Code Status: Full Code    Health care agent: Step-Mother Tanya Howard / Father Garcia Howard  Health care agent number: [phone] / [phone]          What is most important right now is to focus on remaining as independent as possible.  Accordingly, we have decided that the best plan to meet the patient's goals includes continuing with treatment.      Consults:   Consults (From admission, onward)        Status Ordering Provider     Inpatient consult to Nephrology  Once        Provider:  (Not yet assigned)    Completed SHAQ ODELL     Inpatient consult to Ophthalmology  Once        Provider:  (Not yet assigned)    Completed SHAQ ODELL          * Vision loss, right eye  S/p MRI brain and CTA head/neck w/o acute abnormality. Transferred for Ophthalmology consultation - recommending outpatient follow-up. Per note visual changes likely due to proliferative retinopathy in setting of ischemic retinopathy of unknown origin seen on exam 2/2022. [facility] records indicate hx of chronic ischemic retinopathy right eye worse than left.         Chronic abdominal pain  S/p CTA Chest abd/pelvis, no acute findings to explain abdominal pain. prior hospital med notes comment on distractibility on exam and concern for pain seeking behavior    -concern for gastroparesis - erythromycin   -multimodal pain control - tylenol 650 q8, gabapentin 100mg TID,  hydrocodone-acetaminophen q6h PRN, IV dilaudid 0.2mg q6h PRN for breakthrough pain.       Anemia, acute on chronic  required 2 PRBC transfusions @ [facility], on 10/26, 10/28 - Hgb improved to 11     -Nephrology ordered EPO for patient     Hyponatremia  Sodium 123, corrected to 131 on 10/31 2/2 hyperglycemia to 500s. Corrected sodium reveals mild hyponatremia. Patient asymptomatic.        Volume overload with pulmonary edema  received daily HD treatments on 10/27-28-29 prior to transfer due to volume overload. lungs CTA and on room air, lying supine in bed with normal work of breathing. Remained euvolemic      Primary hypertension  Continued home amlodipine 10,  Toprol 50 BID  -hydralazine 5mg IV PRN for SBP>185  -discontinued home hydralazine PO 10/31 d/t hypotension       ESRD (end stage renal disease)  Nephrology consultation to continue HD, her outpatient schedule is Tu/Th/Sa. Euvolemic      Pericardial effusion  S/p new ECHO and evaluated by Cardiology Dr. Hart @ [facility], effusion is hemodynamically INSIGNIFICANT, unchanged in size at this time. there is concern for possible infiltrative Cardiomyopathy though      Seizure  Patient with hx of seizures, on home Keppra.    -continued Keppra 500mg BID      Sickle cell trait        Gastroparesis  CT notes retained stomach contents. Prior notes indicate c/o of nausea/vomiting. Cannot take Reglan d/t hx of seizures. Has been on opioids, will need to be weaned.    - continued PRN phenergan  - scheduled zofran 4mg IV TID  - scheduled Erythryomycin for gastroparesis flare   DC with zofran and phenergan    Type 2 diabetes mellitus with chronic kidney disease on chronic dialysis, with long-term current use of insulin  Patient's FSGs are controlled on current medication regimen. Regimen increased to aspart 4U and MDSSI 10/31 for hyperglycemia to 500s.    Last A1c reviewed-   Lab Results   Component Value Date    HGBA1C 6.2 08/24/2022     Most recent fingerstick  glucose reviewed-   Recent Labs   Lab 10/31/22  1744 10/31/22  2203 11/01/22  0729 11/01/22  1130   POCTGLUCOSE 115* 231* 190* 188*     Current correctional scale  Medium  Maintain anti-hyperglycemic dose as follows-   Antihyperglycemics (From admission, onward)    Start     Stop Route Frequency Ordered    10/31/22 2100  insulin detemir U-100 pen 10 Units         -- SubQ Nightly 10/31/22 1329    10/31/22 1645  insulin aspart U-100 pen 4 Units         -- SubQ 3 times daily with meals 10/31/22 1330    10/31/22 0754  insulin aspart U-100 pen 0-10 Units         -- SubQ Before meals & nightly PRN 10/31/22 0755        Hold Oral hypoglycemics while patient is in the hospital.      Final Active Diagnoses:    Diagnosis Date Noted POA    PRINCIPAL PROBLEM:  Vision loss, right eye [H54.61] 10/29/2022 Yes    Anemia of chronic kidney failure, stage 5 [N18.5, D63.1] 10/31/2022 Yes    Anemia, acute on chronic [D64.9] 10/26/2022 Yes     Chronic    Chronic abdominal pain [R10.9, G89.29] 10/26/2022 Yes     Chronic    Hyponatremia [E87.1] 10/26/2022 Yes    Volume overload with pulmonary edema [E87.70] 10/04/2022 Yes    Primary hypertension [I10] 07/30/2022 Yes     Chronic    ESRD (end stage renal disease) [N18.6] 07/22/2022 Yes    Seizure [R56.9] 05/29/2022 Yes    Gastroparesis [K31.84] 04/12/2022 Yes     Chronic    Sickle cell trait [D57.3] 04/12/2022 Yes     Chronic    Pericardial effusion [I31.39] 03/07/2022 Yes    Chronic kidney disease-mineral and bone disorder [N18.9, E83.9, M89.9] 04/24/2021 Yes    Type 2 diabetes mellitus with chronic kidney disease on chronic dialysis, with long-term current use of insulin [E11.22, N18.6, Z99.2, Z79.4] 10/16/2019 Not Applicable     Chronic      Problems Resolved During this Admission:    Diagnosis Date Noted Date Resolved POA    Hypokalemia [E87.6] 10/26/2022 10/31/2022 Yes       Discharged Condition: stable    Disposition: Home or Self Care    Follow Up:   Follow-up  Information     Primary Doctor No Follow up.    Why: For wound re-check                     Patient Instructions:      Ambulatory referral/consult to Gastroenterology   Standing Status: Future   Referral Priority: Routine Referral Type: Consultation   Referral Reason: Specialty Services Required   Requested Specialty: Gastroenterology   Number of Visits Requested: 1     Ambulatory referral/consult to Ophthalmology   Standing Status: Future   Referral Priority: Routine Referral Type: Consultation   Referral Reason: Specialty Services Required   Requested Specialty: Ophthalmology   Number of Visits Requested: 1     Ambulatory referral/consult to Outpatient Case Management   Referral Priority: Routine Referral Type: Consultation   Referral Reason: Specialty Services Required   Number of Visits Requested: 1     Ambulatory referral/consult to Internal Medicine   Standing Status: Future   Referral Priority: Routine Referral Type: Consultation   Referral Reason: Specialty Services Required   Requested Specialty: Internal Medicine   Number of Visits Requested: 1       Significant Diagnostic Studies: Labs: All labs within the past 24 hours have been reviewed    Pending Diagnostic Studies:     None         Medications:  Reconciled Home Medications:      Medication List      START taking these medications    gabapentin 100 MG capsule  Commonly known as: NEURONTIN  Take 1 capsule (100 mg total) by mouth 3 (three) times daily.     HYDROcodone-acetaminophen 5-325 mg per tablet  Commonly known as: NORCO  Take 1 tablet by mouth every 6 (six) hours as needed for Pain.     promethazine 25 MG tablet  Commonly known as: PHENERGAN  Take 1 tablet (25 mg total) by mouth every 6 (six) hours as needed for Nausea.        CONTINUE taking these medications    albuterol 90 mcg/actuation inhaler  Commonly known as: PROVENTIL/VENTOLIN HFA  Inhale 2 puffs into the lungs every 6 (six) hours as needed for Wheezing. Rescue     amLODIPine 10 MG  tablet  Commonly known as: NORVASC  Take 1 tablet (10 mg total) by mouth once daily.     apixaban 5 mg Tab  Commonly known as: ELIQUIS  Take 1 tablet (5 mg total) by mouth 2 (two) times daily. For second month on.     carvediloL 25 MG tablet  Commonly known as: COREG  Take 1 tablet (25 mg total) by mouth 2 (two) times daily.     cloNIDine 0.2 mg/24 hr td ptwk 0.2 mg/24 hr  Commonly known as: CATAPRES  Place 1 patch onto the skin every 7 days.     famotidine 20 MG tablet  Commonly known as: PEPCID  Take 1 tablet (20 mg total) by mouth once daily.     FLUoxetine 20 MG capsule  Take 1 capsule (20 mg total) by mouth once daily.     furosemide 40 MG tablet  Commonly known as: LASIX  Take 1 tablet (40 mg total) by mouth 2 (two) times daily.     hydrALAZINE 100 MG tablet  Commonly known as: APRESOLINE  Take 1 tablet (100 mg total) by mouth every 8 (eight) hours.     insulin aspart U-100 100 unit/mL (3 mL) Inpn pen  Commonly known as: NovoLOG  Inject 1-10 Units into the skin before meals and at bedtime as needed (Hyperglycemia).     isosorbide mononitrate 60 MG 24 hr tablet  Commonly known as: IMDUR  Take 2 tablets (120 mg total) by mouth once daily.     LANTUS U-100 INSULIN 100 unit/mL injection  Generic drug: insulin glargine  Inject 5 Units into the skin once daily.     levETIRAcetam 500 MG Tab  Commonly known as: KEPPRA  Take 1 tablet (500 mg total) by mouth 2 (two) times daily.     ondansetron 4 MG Tbdl  Commonly known as: ZOFRAN-ODT  Dissolve 1 tablet (4 mg total) by mouth every 8 (eight) hours as needed (nausea, vomiting).     pantoprazole 40 MG tablet  Commonly known as: PROTONIX  Take 1 tablet (40 mg total) by mouth once daily.        STOP taking these medications    metoclopramide HCl 10 MG tablet  Commonly known as: REGLAN     vancomycin 125 MG capsule  Commonly known as: VANCOCIN            Indwelling Lines/Drains at time of discharge:   Lines/Drains/Airways     Central Venous Catheter Line  Duration                 Hemodialysis Catheter 07/26/22 1457 right internal jugular 97 days                Time spent on the discharge of patient: 35 minutes         MICHELLE MAC MD  Department of Hospital Medicine  [facility] - Intensive Care ([place]-[facility])

## 2022-11-01 NOTE — ASSESSMENT & PLAN NOTE
S/p new ECHO and evaluated by Cardiology Dr. Hart @ [facility], effusion is hemodynamically INSIGNIFICANT, unchanged in size at this time. there is concern for possible infiltrative Cardiomyopathy though

## 2022-11-01 NOTE — ASSESSMENT & PLAN NOTE
S/p CTA Chest abd/pelvis, no acute findings to explain abdominal pain. prior hospital med notes comment on distractibility on exam and concern for pain seeking behavior    -concern for gastroparesis - erythromycin   -multimodal pain control - tylenol 650 q8, gabapentin 100mg TID, hydrocodone-acetaminophen q6h PRN, IV dilaudid 0.2mg q6h PRN for breakthrough pain.

## 2022-11-01 NOTE — ASSESSMENT & PLAN NOTE
required 2 PRBC transfusions @ [facility] , on 10/26, 10/28 - Hgb improved to 11     -Nephrology ordered EPO for patient

## 2022-11-01 NOTE — PLAN OF CARE
[facility] - [facility] ([facility])  Discharge Final Note    Primary Care Provider: Primary Doctor No    Expected Discharge Date: 11/1/2022    Pt to be d/c home with no needs.    Pt to arrange transportation when ready    Follow-up appointment made with [facility] [address] [facility]. Phone: [phone]. Appointment scheduled for November 08, 2022 at 1230hrs    Final Discharge Note (most recent)       Final Note - 11/01/22 1223          Final Note    Assessment Type Final Discharge Note (P)      Anticipated Discharge Disposition Home or Self Care (P)         Post-Acute Status    Discharge Delays None known at this time (P)                      Important Message from Medicare             Contact Info       No, Primary Doctor   Relationship: PCP - General        Next Steps: Follow up    Instructions: For wound re-check    No, Primary Doctor   Relationship: PCP - General        Next Steps: Follow up    Instructions: Follow-up appointment made with [facility]  [address] [facility].  Phone: [phone].  Appointment scheduled for November 08, 2022 at 1230hrs

## 2022-11-01 NOTE — PLAN OF CARE
Pt aaox4. Pain managed around the clock. Continues with abx therapy. Pt moves independently in bed. She is free of injuries. Safety measures maintained.

## 2022-11-01 NOTE — ASSESSMENT & PLAN NOTE
S/p MRI brain and CTA head/neck w/o acute abnormality. Transferred for Ophthalmology consultation - recommending outpatient follow-up. Per note visual changes likely due to proliferative retinopathy in setting of ischemic retinopathy of unknown origin seen on exam 2/2022. [facility] [place] hx of chronic ischemic retinopathy right eye worse than left.

## 2022-11-01 NOTE — ASSESSMENT & PLAN NOTE
Patient's FSGs are controlled on current medication regimen. Regimen increased to aspart 4U and MDSSI 10/31 for hyperglycemia to 500s.    Last A1c reviewed-   Lab Results   Component Value Date    HGBA1C 6.2 08/24/2022     Most recent fingerstick glucose reviewed-   Recent Labs   Lab 10/31/22  1744 10/31/22  2203 11/01/22  0729 11/01/22  1130   POCTGLUCOSE 115* 231* 190* 188*     Current correctional scale  Medium  Maintain anti-hyperglycemic dose as follows-   Antihyperglycemics (From admission, onward)    Start     Stop Route Frequency Ordered    10/31/22 2100  insulin detemir U-100 pen 10 Units         -- SubQ Nightly 10/31/22 1329    10/31/22 1645  insulin aspart U-100 pen 4 Units         -- SubQ 3 times daily with meals 10/31/22 1330    10/31/22 0754  insulin aspart U-100 pen 0-10 Units         -- SubQ Before meals & nightly PRN 10/31/22 0755        Hold Oral hypoglycemics while patient is in the hospital.

## 2022-11-01 NOTE — ASSESSMENT & PLAN NOTE
CT notes retained stomach contents. Prior notes indicate c/o of nausea/vomiting. Cannot take Reglan d/t hx of seizures. Has been on opioids, will need to be weaned.    - continued PRN phenergan  - scheduled zofran 4mg IV TID  - scheduled Erythryomycin for gastroparesis flare   DC with zofran and phenergan

## 2022-11-02 ENCOUNTER — PATIENT OUTREACH (OUTPATIENT)
Dept: ADMINISTRATIVE | Facility: CLINIC | Age: 32
End: 2022-11-02
Payer: MEDICAID

## 2022-11-02 LAB — INTERPRETATION SERPL IFE-IMP: NORMAL

## 2022-11-02 NOTE — PROGRESS NOTES
C3 nurse attempted to contact Jasanya Lee  for a TCC post hospital discharge follow up call. No answer. No voicemail available. The patient has a scheduled [facility] appointment with [facility] on 11/8/22 @ 1632.

## 2022-11-03 ENCOUNTER — TELEPHONE (OUTPATIENT)
Dept: OPHTHALMOLOGY | Facility: CLINIC | Age: 32
End: 2022-11-03
Payer: MEDICAID

## 2022-11-03 LAB — PATHOLOGIST INTERPRETATION IFE: NORMAL

## 2022-11-03 NOTE — DISCHARGE SUMMARY
Critical access hospital Medicine  Discharge Summary      Patient Name: Tabby Howard  MRN: [id number]  Admission Date: 10/26/2022  Hospital Length of Stay: 2 days  Discharge Date and Time: 10/29/2022  5:56 PM  Attending Physician: No att. providers found   Discharging Provider: Alexys Gonzalez MD  Primary Care Provider: Primary Doctor Emani        HPI: Ms. Howard is a 32-years-old female who presented to the ED with chief complaint of pain.  Patient reports last night she developed left-sided chest discomfort, associated with shortness of breath, intermittently productive of phlegm, described as blood tinged.  States she is not on home oxygen.  Also admits chronic diffuse abdominal and back pain.  Denies any fever, chills, diarrhea/loose stools, no longer makes urine, no lower extremity edema. patient with recent [facility] admission for abdominal pain with C diff, she was discharged to complete a course of oral vancomycin which she confirms she has but unable to tell me when the last dose.  Known history of ESRD on HD TTS, denies missing any recent sessions.  Known history of insulin-dependent diabetes, states she takes Levemir 18 units in the evening but unable to tell me when last dose.  In the ED afebrile, heart rates in the low 100, blood pressure uncontrolled up to 179/108, placed on nasal cannula for comfort.  Labs with WBC 7.65, hemoglobin 7.6, platelets 74, potassium 2.9, creatinine 3, glucose 448, BNP 4138, troponin 0.058.  EKG sinus tachycardia at 108 beats per minute.  Chest x-ray consistent with pulmonary edema.  CT abdomen and pelvis reviewed.  Previous echo July 2022 with EF of 50% with moderate circumferential pericardial effusion.  She received morphine 4 mg and Zofran 4 mg in the E D.  Case discussed with ED provider who stated he discussed with Nephrology, planning dialysis today for volume removal as well as 1 unit PRBC transfusion.  Plan of care discussed with patient, no visitors at bedside.   Discussed with RN.    * No surgery found *      Hospital Course: 33 y/o F w/ hx of ESRD on HD, DM, HFpEF, GERD, suspected gastroparesis, sickle cell trait, HTN, with a recent episode of C diff who was admitted for [facility] on 10/26 for blood tinged sputum, chest pain and back pain, thought to be related to volume overload, she was dialyzed. Her TTE was concerning for possible amyloidosis.      On 10/29 she reported several days of R eye vision loss, her vision was diminished to light perception in her R eye. She was then transferred to [facility] for ophthalmology evaluation.       Physical Exam:  General- uncomfortable from reported pain from abd  HEENT- R eye blindness. R eye diminished sensitivity to light  Neck- supple  CV- Regular rate and rhythm  Resp- Lungs CTA Bilaterally, No increased WOB  GI- Non tender/non-distended, BS normoactive x4 quads  Extrem- No cyanosis, clubbing, edema.  Derm- skin w/d  Neuro-  No flap.     Consults:     Final Active Diagnoses:    Diagnosis Date Noted POA    PRINCIPAL PROBLEM:  Volume overload with pulmonary edema [E87.70] 10/04/2022 Yes    Chest pain [R07.9] 10/26/2022 Yes    Hyponatremia [E87.1] 10/26/2022 Yes    Anemia, acute on chronic [D64.9] 10/26/2022 Yes     Chronic    Thrombocytopenia [D69.6] 10/26/2022 Yes     Chronic    Chronic abdominal pain [R10.9, G89.29] 10/26/2022 Yes     Chronic    Hyperglycemia [R73.9] 10/26/2022 Yes    Primary hypertension [I10] 07/30/2022 Yes     Chronic    Deep vein thrombosis (DVT) of non-extremity vein [I82.90] 07/26/2022 Yes     Chronic    ESRD (end stage renal disease) [N18.6] 07/22/2022 Yes    Gastroparesis [K31.84] 04/12/2022 Yes     Chronic    Sickle cell trait [D57.3] 04/12/2022 Yes     Chronic    Pericardial effusion [I31.39] 03/07/2022 Yes    Type 2 diabetes mellitus with chronic kidney disease on chronic dialysis, with long-term current use of insulin [E11.22, N18.6, Z99.2, Z79.4] 10/16/2019 Yes     Chronic    SVT  (supraventricular tachycardia) [I47.1] 09/15/2018 Yes      Problems Resolved During this Admission:    Diagnosis Date Noted Date Resolved POA    Hypokalemia [E87.6] 10/26/2022 10/31/2022 Yes      Discharged Condition: fair    Disposition: Discharged to Other Facility    Follow Up:    Patient Instructions:   No discharge procedures on file.  Medications:  Transfer Medications (for Discharge Readmit only):   No current facility-administered medications for this encounter.     Current Outpatient Medications   Medication Sig Dispense Refill    amLODIPine (NORVASC) 10 MG tablet Take 1 tablet (10 mg total) by mouth once daily. 30 tablet 11    apixaban (ELIQUIS) 5 mg Tab Take 1 tablet (5 mg total) by mouth 2 (two) times daily. For second month on. 60 tablet 2    cloNIDine 0.2 mg/24 hr td ptwk (CATAPRES) 0.2 mg/24 hr Place 1 patch onto the skin every 7 days. 4 patch 2    albuterol (PROVENTIL/VENTOLIN HFA) 90 mcg/actuation inhaler Inhale 2 puffs into the lungs every 6 (six) hours as needed for Wheezing. Rescue      carvediloL (COREG) 25 MG tablet Take 1 tablet (25 mg total) by mouth 2 (two) times daily. 60 tablet 2    famotidine (PEPCID) 20 MG tablet Take 1 tablet (20 mg total) by mouth once daily. 30 tablet 0    FLUoxetine 20 MG capsule Take 1 capsule (20 mg total) by mouth once daily. 30 capsule 1    furosemide (LASIX) 40 MG tablet Take 1 tablet (40 mg total) by mouth 2 (two) times daily. 60 tablet 0    gabapentin (NEURONTIN) 100 MG capsule Take 1 capsule (100 mg total) by mouth 3 (three) times daily. 90 capsule 2    hydrALAZINE (APRESOLINE) 100 MG tablet Take 1 tablet (100 mg total) by mouth every 8 (eight) hours. 90 tablet 2    HYDROcodone-acetaminophen (NORCO) 5-325 mg per tablet Take 1 tablet by mouth every 6 (six) hours as needed for Pain. 16 tablet 0    insulin aspart U-100 (NOVOLOG) 100 unit/mL (3 mL) InPn pen Inject 1-10 Units into the skin before meals and at bedtime as needed (Hyperglycemia). 3 mL 3    isosorbide  mononitrate (IMDUR) 60 MG 24 hr tablet Take 2 tablets (120 mg total) by mouth once daily. 30 tablet 1    LANTUS U-100 INSULIN 100 unit/mL injection Inject 5 Units into the skin once daily.      levETIRAcetam (KEPPRA) 500 MG Tab Take 1 tablet (500 mg total) by mouth 2 (two) times daily. 60 tablet 1    ondansetron (ZOFRAN-ODT) 4 MG TbDL Dissolve 1 tablet (4 mg total) by mouth every 8 (eight) hours as needed (nausea, vomiting). 12 tablet 0    pantoprazole (PROTONIX) 40 MG tablet Take 1 tablet (40 mg total) by mouth once daily. 30 tablet 0    promethazine (PHENERGAN) 25 MG tablet Take 1 tablet (25 mg total) by mouth every 6 (six) hours as needed for Nausea. 10 tablet 0       Pending Diagnostic Studies:       None          Indwelling Lines/Drains at time of discharge:   Lines/Drains/Airways       Central Venous Catheter Line  Duration                  Hemodialysis Catheter 07/26/22 1457 right internal jugular 99 days                    Time spent on the discharge of patient: 40 minutes         Alexys Gonzalez MD  Department of Hospital Medicine  [facility]

## 2022-11-03 NOTE — TELEPHONE ENCOUNTER
----- Message from Elidia Russell sent at 11/3/2022  2:36 PM CDT -----  Maria Eugenia is calling to try to get patient referred to see a retina specialist as soon as possible.   Patient has a possible retinal tear. The doctor stated that the patient will need b scan for both eyes.  I asked Maria Eugenia to fax over a referral just incase it is needed.  Please contact Maria Eugenia with any questions at [phone]     Please contact patient to schedule at [phone].

## 2022-11-04 ENCOUNTER — OFFICE VISIT (OUTPATIENT)
Dept: OPHTHALMOLOGY | Facility: CLINIC | Age: 32
End: 2022-11-04
Payer: MEDICAID

## 2022-11-04 DIAGNOSIS — H43.13 VITREOUS HEMORRHAGE, BOTH EYES: ICD-10-CM

## 2022-11-04 DIAGNOSIS — H26.9 CORTICAL CATARACT OF BOTH EYES: ICD-10-CM

## 2022-11-04 DIAGNOSIS — E11.3553 STABLE PROLIFERATIVE DIABETIC RETINOPATHY OF BOTH EYES ASSOCIATED WITH TYPE 2 DIABETES MELLITUS: Primary | ICD-10-CM

## 2022-11-04 PROCEDURE — 99999 PR PBB SHADOW E&M-EST. PATIENT-LVL III: CPT | Mod: PBBFAC,,, | Performed by: OPHTHALMOLOGY

## 2022-11-04 PROCEDURE — 1111F DSCHRG MED/CURRENT MED MERGE: CPT | Mod: CPTII,,, | Performed by: OPHTHALMOLOGY

## 2022-11-04 PROCEDURE — 67028 INJECTION EYE DRUG: CPT | Mod: 50,S$PBB,, | Performed by: OPHTHALMOLOGY

## 2022-11-04 PROCEDURE — 1159F PR MEDICATION LIST DOCUMENTED IN MEDICAL RECORD: ICD-10-PCS | Mod: CPTII,,, | Performed by: OPHTHALMOLOGY

## 2022-11-04 PROCEDURE — 4010F PR ACE/ARB THEARPY RXD/TAKEN: ICD-10-PCS | Mod: CPTII,,, | Performed by: OPHTHALMOLOGY

## 2022-11-04 PROCEDURE — 99213 OFFICE O/P EST LOW 20 MIN: CPT | Mod: PBBFAC,25 | Performed by: OPHTHALMOLOGY

## 2022-11-04 PROCEDURE — 99204 PR OFFICE/OUTPT VISIT, NEW, LEVL IV, 45-59 MIN: ICD-10-PCS | Mod: 25,S$PBB,, | Performed by: OPHTHALMOLOGY

## 2022-11-04 PROCEDURE — 99204 OFFICE O/P NEW MOD 45 MIN: CPT | Mod: 25,S$PBB,, | Performed by: OPHTHALMOLOGY

## 2022-11-04 PROCEDURE — 1111F PR DISCHARGE MEDS RECONCILED W/ CURRENT OUTPATIENT MED LIST: ICD-10-PCS | Mod: CPTII,,, | Performed by: OPHTHALMOLOGY

## 2022-11-04 PROCEDURE — 1159F MED LIST DOCD IN RCRD: CPT | Mod: CPTII,,, | Performed by: OPHTHALMOLOGY

## 2022-11-04 PROCEDURE — 76512 B SCAN: ICD-10-PCS | Mod: 26,50,S$PBB, | Performed by: OPHTHALMOLOGY

## 2022-11-04 PROCEDURE — 67028 INJECTION EYE DRUG: CPT | Mod: 50,PBBFAC | Performed by: OPHTHALMOLOGY

## 2022-11-04 PROCEDURE — 76512 OPH US DX B-SCAN: CPT | Mod: 50,PBBFAC | Performed by: OPHTHALMOLOGY

## 2022-11-04 PROCEDURE — 3066F PR DOCUMENTATION OF TREATMENT FOR NEPHROPATHY: ICD-10-PCS | Mod: CPTII,,, | Performed by: OPHTHALMOLOGY

## 2022-11-04 PROCEDURE — 67028 PR INJECT INTRAVITREAL PHARMCOLOGIC: ICD-10-PCS | Mod: 50,S$PBB,, | Performed by: OPHTHALMOLOGY

## 2022-11-04 PROCEDURE — 1160F RVW MEDS BY RX/DR IN RCRD: CPT | Mod: CPTII,,, | Performed by: OPHTHALMOLOGY

## 2022-11-04 PROCEDURE — 99999 PR PBB SHADOW E&M-EST. PATIENT-LVL III: ICD-10-PCS | Mod: PBBFAC,,, | Performed by: OPHTHALMOLOGY

## 2022-11-04 PROCEDURE — 3066F NEPHROPATHY DOC TX: CPT | Mod: CPTII,,, | Performed by: OPHTHALMOLOGY

## 2022-11-04 PROCEDURE — 3044F HG A1C LEVEL LT 7.0%: CPT | Mod: CPTII,,, | Performed by: OPHTHALMOLOGY

## 2022-11-04 PROCEDURE — 4010F ACE/ARB THERAPY RXD/TAKEN: CPT | Mod: CPTII,,, | Performed by: OPHTHALMOLOGY

## 2022-11-04 PROCEDURE — 1160F PR REVIEW ALL MEDS BY PRESCRIBER/CLIN PHARMACIST DOCUMENTED: ICD-10-PCS | Mod: CPTII,,, | Performed by: OPHTHALMOLOGY

## 2022-11-04 PROCEDURE — 3044F PR MOST RECENT HEMOGLOBIN A1C LEVEL <7.0%: ICD-10-PCS | Mod: CPTII,,, | Performed by: OPHTHALMOLOGY

## 2022-11-04 RX ADMIN — Medication 1.25 MG: at 11:11

## 2022-11-04 NOTE — PROGRESS NOTES
HPI    URGENT CARE: Sudden Vision Loss  URGENT CARE: Sudden decrease in vision OD and OS    Referral by Dr. Mel Joseph MD    CC: pt reports: x 2 wks ago I was at dialysis getting treatment, my vision   OD started getting blurry and then I couldn't see and x 2 day later my   left eye got blurry.     this moring    ++blurred Va  --flashes/++floaters OU   ++headaches (rating 8-9)   --diplopia  ++veil and curtain OD    Eye Meds: NONE    POHx  1. Type 2 diabetes mellitus with chronic kidney disease on chronic   dialysis, with long-term current use of insulin     Last edited by Roro Moore MA on 11/4/2022 10:11 AM.         A/P    ICD-10-CM ICD-9-CM   1. Stable proliferative diabetic retinopathy of both eyes associated with type 2 diabetes mellitus  E11.3553 250.50     362.02   2. Vitreous hemorrhage, both eyes  H43.13 379.23   3. Cortical cataract of both eyes  H26.9 366.9       1. Stable proliferative diabetic retinopathy of both eyes associated with type 2 diabetes mellitus  2. Vitreous hemorrhage, both eyes  F/u for VH, DM check  On Dialysis    OD: no injections or laser  VA HM with dense VH, retina flat on B scan with heme and few membranes  Plan: discussed possibility of needing PPV, will try antiVEGF with BLANCA first   Based on todays exam, diagnostic studies, and review of records, the determination was made for treatment today.  Schedule Avastin Injection today Right Eye Patient chooses to proceed with injection R/B/A discussed include infection retinal detachment and stroke    Patient identified.  Timeout performed.    Risks, benefits, and alternatives to treatment were discussed in detail with the patient, including bleeding/infection (endophthalmitis)/etc.  The patient voiced understanding and wished to proceed with the procedure.  See separate consent form.    Injection Procedure Note:  Diagnosis: VH Right Eye    Topical Proparacaine drop placed then topical 5% Betadine and then subconjunctival  lidocaine 2% injection  Sterile gloves used, and sterile lid speculum placed.  5% Betadine placed at injection site again prior to injection.  Avastin 1.25mg in 0.05cc Injected inferotemporally 3.5-4mm posterior to the limbus.  Complications: None  Va at least CF at 5 feet post injection.  Retina, ONH, IOP normal after injection.    Followup as below.  Patient should return immediately PRN.  Retinal Detachment and Endophthalmitis precautions given.     OS: no injections or laser  VA 20/150 ph with dense VH, retina attached B scan with few membranes  Plan: needs BLANCA OS possibly needing PPV in future  Based on todays exam, diagnostic studies, and review of records, the determination was made for treatment today.  Schedule Avastin Injection today Left Eye Patient chooses to proceed with injection R/B/A discussed include infection retinal detachment and stroke    Patient identified.  Timeout performed.    Risks, benefits, and alternatives to treatment were discussed in detail with the patient, including bleeding/infection (endophthalmitis)/etc.  The patient voiced understanding and wished to proceed with the procedure.  See separate consent form.    Injection Procedure Note:  Diagnosis: VH Left Eye    Topical Proparacaine drop placed then topical 5% Betadine and then subconjunctival lidocaine 2% injection  Sterile gloves used, and sterile lid speculum placed.  5% Betadine placed at injection site again prior to injection.  Avastin 1.25mg in 0.05cc Injected inferotemporally 3.5-4mm posterior to the limbus.  Complications: None  Va at least CF at 5 feet post injection.  Retina, ONH, IOP normal after injection.    Followup as below.  Patient should return immediately PRN.  Retinal Detachment and Endophthalmitis precautions given.     3. Cortical cataract of both eyes  Mod CC  Plan: Observation for now    RTC 1 mo DFE/OCTm OU, poss BLANCA OU, poss sign up PPV OD      I saw and examined the patient and reviewed in detail the  findings documented. The final examination findings, image interpretations, and plan as documented in the record represent my personal judgment and conclusions.    Alfonso Garay MD  Vitreoretinal Surgery   [facility]

## 2022-11-06 ENCOUNTER — HOSPITAL ENCOUNTER (OUTPATIENT)
Facility: HOSPITAL | Age: 32
Discharge: HOME | End: 2022-11-07
Attending: EMERGENCY MEDICINE | Admitting: INTERNAL MEDICINE
Payer: MEDICAID

## 2022-11-06 DIAGNOSIS — D64.9 ANEMIA, UNSPECIFIED TYPE: ICD-10-CM

## 2022-11-06 DIAGNOSIS — N18.6 STAGE 5 CHRONIC KIDNEY DISEASE ON CHRONIC DIALYSIS: ICD-10-CM

## 2022-11-06 DIAGNOSIS — R07.9 CHEST PAIN: ICD-10-CM

## 2022-11-06 DIAGNOSIS — J81.0 ACUTE PULMONARY EDEMA: Primary | ICD-10-CM

## 2022-11-06 DIAGNOSIS — Z99.2 STAGE 5 CHRONIC KIDNEY DISEASE ON CHRONIC DIALYSIS: ICD-10-CM

## 2022-11-06 PROBLEM — J81.1 PULMONARY EDEMA: Status: ACTIVE | Noted: 2022-07-22

## 2022-11-06 LAB
ABO + RH BLD: NORMAL
ALBUMIN SERPL BCP-MCNC: 3.1 G/DL (ref 3.5–5.2)
ALP SERPL-CCNC: 185 U/L (ref 55–135)
ALT SERPL W/O P-5'-P-CCNC: 136 U/L (ref 10–44)
ANION GAP SERPL CALC-SCNC: 7 MMOL/L (ref 8–16)
AST SERPL-CCNC: 98 U/L (ref 10–40)
BASOPHILS # BLD AUTO: 0.02 K/UL (ref 0–0.2)
BASOPHILS NFR BLD: 0.3 % (ref 0–1.9)
BILIRUB SERPL-MCNC: 2.4 MG/DL (ref 0.1–1)
BLD GP AB SCN CELLS X3 SERPL QL: NORMAL
BLD PROD TYP BPU: NORMAL
BLD PROD TYP BPU: NORMAL
BLOOD UNIT EXPIRATION DATE: NORMAL
BLOOD UNIT EXPIRATION DATE: NORMAL
BLOOD UNIT TYPE CODE: 6200
BLOOD UNIT TYPE CODE: 6200
BLOOD UNIT TYPE: NORMAL
BLOOD UNIT TYPE: NORMAL
BNP SERPL-MCNC: 4040 PG/ML (ref 0–99)
BUN SERPL-MCNC: 29 MG/DL (ref 6–20)
CALCIUM SERPL-MCNC: 8.7 MG/DL (ref 8.7–10.5)
CHLORIDE SERPL-SCNC: 99 MMOL/L (ref 95–110)
CO2 SERPL-SCNC: 29 MMOL/L (ref 23–29)
CODING SYSTEM: NORMAL
CODING SYSTEM: NORMAL
CREAT SERPL-MCNC: 3.7 MG/DL (ref 0.5–1.4)
DIFFERENTIAL METHOD: ABNORMAL
DISPENSE STATUS: NORMAL
DISPENSE STATUS: NORMAL
EOSINOPHIL # BLD AUTO: 0.1 K/UL (ref 0–0.5)
EOSINOPHIL NFR BLD: 0.9 % (ref 0–8)
ERYTHROCYTE [DISTWIDTH] IN BLOOD BY AUTOMATED COUNT: 15.3 % (ref 11.5–14.5)
EST. GFR  (NO RACE VARIABLE): 15.9 ML/MIN/1.73 M^2
GLUCOSE SERPL-MCNC: 180 MG/DL (ref 70–110)
GLUCOSE SERPL-MCNC: 63 MG/DL (ref 70–110)
GLUCOSE SERPL-MCNC: 96 MG/DL (ref 70–110)
HCT VFR BLD AUTO: 21.5 % (ref 37–48.5)
HGB BLD-MCNC: 7.2 G/DL (ref 12–16)
IMM GRANULOCYTES # BLD AUTO: 0.03 K/UL (ref 0–0.04)
IMM GRANULOCYTES NFR BLD AUTO: 0.4 % (ref 0–0.5)
INFLUENZA A, MOLECULAR: NEGATIVE
INFLUENZA B, MOLECULAR: NEGATIVE
LYMPHOCYTES # BLD AUTO: 0.8 K/UL (ref 1–4.8)
LYMPHOCYTES NFR BLD: 11.4 % (ref 18–48)
MCH RBC QN AUTO: 30 PG (ref 27–31)
MCHC RBC AUTO-ENTMCNC: 33.5 G/DL (ref 32–36)
MCV RBC AUTO: 87 FL (ref 82–98)
MONOCYTES # BLD AUTO: 0.6 K/UL (ref 0.3–1)
MONOCYTES NFR BLD: 8.1 % (ref 4–15)
NEUTROPHILS # BLD AUTO: 5.8 K/UL (ref 1.8–7.7)
NEUTROPHILS NFR BLD: 78.9 % (ref 38–73)
NRBC BLD-RTO: 0 /100 WBC
NUM UNITS TRANS PACKED RBC: NORMAL
NUM UNITS TRANS PACKED RBC: NORMAL
PLATELET # BLD AUTO: 102 K/UL (ref 150–450)
PMV BLD AUTO: 9.9 FL (ref 9.2–12.9)
POTASSIUM SERPL-SCNC: 5.2 MMOL/L (ref 3.5–5.1)
PROT SERPL-MCNC: 6.6 G/DL (ref 6–8.4)
RBC # BLD AUTO: 2.4 M/UL (ref 4–5.4)
SARS-COV-2 RDRP RESP QL NAA+PROBE: NEGATIVE
SODIUM SERPL-SCNC: 135 MMOL/L (ref 136–145)
SPECIMEN SOURCE: NORMAL
TROPONIN I SERPL DL<=0.01 NG/ML-MCNC: 0.05 NG/ML
TROPONIN I SERPL DL<=0.01 NG/ML-MCNC: 0.05 NG/ML
WBC # BLD AUTO: 7.39 K/UL (ref 3.9–12.7)

## 2022-11-06 PROCEDURE — 94761 N-INVAS EAR/PLS OXIMETRY MLT: CPT

## 2022-11-06 PROCEDURE — 83880 ASSAY OF NATRIURETIC PEPTIDE: CPT | Performed by: EMERGENCY MEDICINE

## 2022-11-06 PROCEDURE — G0378 HOSPITAL OBSERVATION PER HR: HCPCS

## 2022-11-06 PROCEDURE — 96365 THER/PROPH/DIAG IV INF INIT: CPT

## 2022-11-06 PROCEDURE — 93005 ELECTROCARDIOGRAM TRACING: CPT | Mod: 59 | Performed by: INTERNAL MEDICINE

## 2022-11-06 PROCEDURE — 93010 ELECTROCARDIOGRAM REPORT: CPT | Mod: ,,, | Performed by: INTERNAL MEDICINE

## 2022-11-06 PROCEDURE — 80053 COMPREHEN METABOLIC PANEL: CPT | Performed by: EMERGENCY MEDICINE

## 2022-11-06 PROCEDURE — 96372 THER/PROPH/DIAG INJ SC/IM: CPT | Performed by: INTERNAL MEDICINE

## 2022-11-06 PROCEDURE — 36430 TRANSFUSION BLD/BLD COMPNT: CPT

## 2022-11-06 PROCEDURE — 99285 EMERGENCY DEPT VISIT HI MDM: CPT | Mod: 25

## 2022-11-06 PROCEDURE — 93010 EKG 12-LEAD: ICD-10-PCS | Mod: ,,, | Performed by: INTERNAL MEDICINE

## 2022-11-06 PROCEDURE — 84484 ASSAY OF TROPONIN QUANT: CPT | Mod: 91 | Performed by: EMERGENCY MEDICINE

## 2022-11-06 PROCEDURE — 87040 BLOOD CULTURE FOR BACTERIA: CPT | Performed by: INTERNAL MEDICINE

## 2022-11-06 PROCEDURE — 63600175 PHARM REV CODE 636 W HCPCS: Performed by: EMERGENCY MEDICINE

## 2022-11-06 PROCEDURE — 87502 INFLUENZA DNA AMP PROBE: CPT | Performed by: EMERGENCY MEDICINE

## 2022-11-06 PROCEDURE — 86920 COMPATIBILITY TEST SPIN: CPT | Performed by: INTERNAL MEDICINE

## 2022-11-06 PROCEDURE — 96375 TX/PRO/DX INJ NEW DRUG ADDON: CPT

## 2022-11-06 PROCEDURE — 99900035 HC TECH TIME PER 15 MIN (STAT)

## 2022-11-06 PROCEDURE — P9016 RBC LEUKOCYTES REDUCED: HCPCS | Performed by: INTERNAL MEDICINE

## 2022-11-06 PROCEDURE — 25000003 PHARM REV CODE 250: Performed by: INTERNAL MEDICINE

## 2022-11-06 PROCEDURE — 90935 HEMODIALYSIS ONE EVALUATION: CPT

## 2022-11-06 PROCEDURE — G0257 UNSCHED DIALYSIS ESRD PT HOS: HCPCS

## 2022-11-06 PROCEDURE — 27000221 HC OXYGEN, UP TO 24 HOURS

## 2022-11-06 PROCEDURE — 85025 COMPLETE CBC W/AUTO DIFF WBC: CPT | Performed by: EMERGENCY MEDICINE

## 2022-11-06 PROCEDURE — 63600175 PHARM REV CODE 636 W HCPCS: Mod: JG | Performed by: INTERNAL MEDICINE

## 2022-11-06 PROCEDURE — 36415 COLL VENOUS BLD VENIPUNCTURE: CPT | Performed by: INTERNAL MEDICINE

## 2022-11-06 PROCEDURE — U0002 COVID-19 LAB TEST NON-CDC: HCPCS | Performed by: EMERGENCY MEDICINE

## 2022-11-06 PROCEDURE — 86901 BLOOD TYPING SEROLOGIC RH(D): CPT | Performed by: INTERNAL MEDICINE

## 2022-11-06 RX ORDER — LEVOFLOXACIN 5 MG/ML
500 INJECTION, SOLUTION INTRAVENOUS
Status: DISCONTINUED | OUTPATIENT
Start: 2022-11-06 | End: 2022-11-07 | Stop reason: HOSPADM

## 2022-11-06 RX ORDER — NALOXONE HCL 0.4 MG/ML
0.02 VIAL (ML) INJECTION
Status: DISCONTINUED | OUTPATIENT
Start: 2022-11-06 | End: 2022-11-07 | Stop reason: HOSPADM

## 2022-11-06 RX ORDER — GLUCAGON 1 MG
1 KIT INJECTION
Status: DISCONTINUED | OUTPATIENT
Start: 2022-11-06 | End: 2022-11-07 | Stop reason: HOSPADM

## 2022-11-06 RX ORDER — IBUPROFEN 200 MG
16 TABLET ORAL
Status: DISCONTINUED | OUTPATIENT
Start: 2022-11-06 | End: 2022-11-07 | Stop reason: HOSPADM

## 2022-11-06 RX ORDER — ISOSORBIDE MONONITRATE 60 MG/1
120 TABLET, EXTENDED RELEASE ORAL DAILY
Status: DISCONTINUED | OUTPATIENT
Start: 2022-11-06 | End: 2022-11-07 | Stop reason: HOSPADM

## 2022-11-06 RX ORDER — CARVEDILOL 25 MG/1
25 TABLET ORAL 2 TIMES DAILY
Status: DISCONTINUED | OUTPATIENT
Start: 2022-11-06 | End: 2022-11-07 | Stop reason: HOSPADM

## 2022-11-06 RX ORDER — ACETAMINOPHEN 325 MG/1
650 TABLET ORAL EVERY 6 HOURS PRN
Status: DISCONTINUED | OUTPATIENT
Start: 2022-11-06 | End: 2022-11-07 | Stop reason: HOSPADM

## 2022-11-06 RX ORDER — SODIUM CHLORIDE 9 MG/ML
INJECTION, SOLUTION INTRAVENOUS ONCE
Status: DISCONTINUED | OUTPATIENT
Start: 2022-11-06 | End: 2022-11-07 | Stop reason: HOSPADM

## 2022-11-06 RX ORDER — ALBUTEROL SULFATE 90 UG/1
2 AEROSOL, METERED RESPIRATORY (INHALATION) EVERY 6 HOURS PRN
Status: DISCONTINUED | OUTPATIENT
Start: 2022-11-06 | End: 2022-11-06

## 2022-11-06 RX ORDER — HYDRALAZINE HYDROCHLORIDE 25 MG/1
100 TABLET, FILM COATED ORAL EVERY 8 HOURS
Status: DISCONTINUED | OUTPATIENT
Start: 2022-11-06 | End: 2022-11-07 | Stop reason: HOSPADM

## 2022-11-06 RX ORDER — GABAPENTIN 100 MG/1
100 CAPSULE ORAL 3 TIMES DAILY
Status: DISCONTINUED | OUTPATIENT
Start: 2022-11-06 | End: 2022-11-07 | Stop reason: HOSPADM

## 2022-11-06 RX ORDER — ALBUTEROL SULFATE 0.83 MG/ML
2.5 SOLUTION RESPIRATORY (INHALATION) EVERY 6 HOURS PRN
Status: DISCONTINUED | OUTPATIENT
Start: 2022-11-06 | End: 2022-11-07 | Stop reason: HOSPADM

## 2022-11-06 RX ORDER — ONDANSETRON 2 MG/ML
4 INJECTION INTRAMUSCULAR; INTRAVENOUS
Status: COMPLETED | OUTPATIENT
Start: 2022-11-06 | End: 2022-11-06

## 2022-11-06 RX ORDER — LEVETIRACETAM 500 MG/1
500 TABLET ORAL 2 TIMES DAILY
Status: DISCONTINUED | OUTPATIENT
Start: 2022-11-06 | End: 2022-11-07 | Stop reason: HOSPADM

## 2022-11-06 RX ORDER — CLONIDINE 0.2 MG/24H
1 PATCH, EXTENDED RELEASE TRANSDERMAL
Status: DISCONTINUED | OUTPATIENT
Start: 2022-11-06 | End: 2022-11-07 | Stop reason: HOSPADM

## 2022-11-06 RX ORDER — SODIUM CHLORIDE 0.9 % (FLUSH) 0.9 %
10 SYRINGE (ML) INJECTION EVERY 12 HOURS PRN
Status: DISCONTINUED | OUTPATIENT
Start: 2022-11-06 | End: 2022-11-07 | Stop reason: HOSPADM

## 2022-11-06 RX ORDER — HALOPERIDOL 5 MG/ML
2.5 INJECTION INTRAMUSCULAR EVERY 6 HOURS PRN
Status: DISCONTINUED | OUTPATIENT
Start: 2022-11-07 | End: 2022-11-07 | Stop reason: HOSPADM

## 2022-11-06 RX ORDER — MUPIROCIN 20 MG/G
OINTMENT TOPICAL 2 TIMES DAILY
Status: DISCONTINUED | OUTPATIENT
Start: 2022-11-06 | End: 2022-11-07 | Stop reason: HOSPADM

## 2022-11-06 RX ORDER — ASPIRIN 325 MG
325 TABLET ORAL
Status: ACTIVE | OUTPATIENT
Start: 2022-11-06 | End: 2022-11-06

## 2022-11-06 RX ORDER — FLUOXETINE HYDROCHLORIDE 20 MG/1
20 CAPSULE ORAL DAILY
Status: DISCONTINUED | OUTPATIENT
Start: 2022-11-06 | End: 2022-11-07 | Stop reason: HOSPADM

## 2022-11-06 RX ORDER — AMLODIPINE BESYLATE 5 MG/1
10 TABLET ORAL DAILY
Status: DISCONTINUED | OUTPATIENT
Start: 2022-11-06 | End: 2022-11-07 | Stop reason: HOSPADM

## 2022-11-06 RX ORDER — MORPHINE SULFATE 4 MG/ML
4 INJECTION, SOLUTION INTRAMUSCULAR; INTRAVENOUS
Status: COMPLETED | OUTPATIENT
Start: 2022-11-06 | End: 2022-11-06

## 2022-11-06 RX ORDER — FUROSEMIDE 40 MG/1
40 TABLET ORAL 2 TIMES DAILY
Status: DISCONTINUED | OUTPATIENT
Start: 2022-11-06 | End: 2022-11-07 | Stop reason: HOSPADM

## 2022-11-06 RX ORDER — IBUPROFEN 200 MG
24 TABLET ORAL
Status: DISCONTINUED | OUTPATIENT
Start: 2022-11-06 | End: 2022-11-07 | Stop reason: HOSPADM

## 2022-11-06 RX ORDER — HYDROCODONE BITARTRATE AND ACETAMINOPHEN 5; 325 MG/1; MG/1
1 TABLET ORAL EVERY 6 HOURS PRN
Status: DISCONTINUED | OUTPATIENT
Start: 2022-11-06 | End: 2022-11-07 | Stop reason: HOSPADM

## 2022-11-06 RX ADMIN — MUPIROCIN 1 G: 20 OINTMENT TOPICAL at 05:11

## 2022-11-06 RX ADMIN — HYDRALAZINE HYDROCHLORIDE 100 MG: 25 TABLET ORAL at 05:11

## 2022-11-06 RX ADMIN — ONDANSETRON HYDROCHLORIDE 4 MG: 2 INJECTION, SOLUTION INTRAMUSCULAR; INTRAVENOUS at 06:11

## 2022-11-06 RX ADMIN — CARVEDILOL 25 MG: 25 TABLET, FILM COATED ORAL at 05:11

## 2022-11-06 RX ADMIN — GABAPENTIN 100 MG: 100 CAPSULE ORAL at 09:11

## 2022-11-06 RX ADMIN — EPOETIN ALFA-EPBX 10000 UNITS: 10000 INJECTION, SOLUTION INTRAVENOUS; SUBCUTANEOUS at 02:11

## 2022-11-06 RX ADMIN — ACETAMINOPHEN 650 MG: 325 TABLET ORAL at 12:11

## 2022-11-06 RX ADMIN — FUROSEMIDE 40 MG: 40 TABLET ORAL at 09:11

## 2022-11-06 RX ADMIN — APIXABAN 5 MG: 5 TABLET, FILM COATED ORAL at 09:11

## 2022-11-06 RX ADMIN — FLUOXETINE 20 MG: 20 CAPSULE ORAL at 05:11

## 2022-11-06 RX ADMIN — LEVETIRACETAM 500 MG: 500 TABLET, FILM COATED ORAL at 09:11

## 2022-11-06 RX ADMIN — ISOSORBIDE MONONITRATE 120 MG: 60 TABLET, EXTENDED RELEASE ORAL at 05:11

## 2022-11-06 RX ADMIN — HYDROCODONE BITARTRATE AND ACETAMINOPHEN 1 TABLET: 5; 325 TABLET ORAL at 07:11

## 2022-11-06 RX ADMIN — LEVOFLOXACIN 500 MG: 500 INJECTION, SOLUTION INTRAVENOUS at 07:11

## 2022-11-06 RX ADMIN — HYDROCODONE BITARTRATE AND ACETAMINOPHEN 1 TABLET: 5; 325 TABLET ORAL at 10:11

## 2022-11-06 RX ADMIN — GABAPENTIN 100 MG: 100 CAPSULE ORAL at 05:11

## 2022-11-06 RX ADMIN — MORPHINE SULFATE 4 MG: 4 INJECTION, SOLUTION INTRAMUSCULAR; INTRAVENOUS at 06:11

## 2022-11-06 RX ADMIN — AMLODIPINE BESYLATE 10 MG: 5 TABLET ORAL at 05:11

## 2022-11-06 RX ADMIN — FUROSEMIDE 40 MG: 40 TABLET ORAL at 05:11

## 2022-11-06 NOTE — PROGRESS NOTES
HD tx tolerated well  2 units PRBC administered  Net UF 2.6 L       11/06/22 1705   Handoff Report   Received From Stephanie Mena   Given To Walker   Vital Signs   Temp 98.6 °F (37 °C)   Temp src Axillary   Pulse 88   BP (!) 184/127   Assessments (Pre/Post)   Blood Liters Processed (BLP) 57   Transport Modality bed   Level of Consciousness (AVPU) alert   Dialyzer Clearance mildly streaked   Post-Hemodialysis Assessment   Rinseback Volume (mL) 250 mL   Blood Volume Processed (Liters) 57 L   Dialyzer Clearance Lightly streaked   Duration of Treatment 180 minutes   Additional Fluid Intake (mL) 500 mL   Total UF (mL) 3800 mL   Net Fluid Removal 2618   Patient Response to Treatment Tolerated well   Post-Treatment Weight 58.9 kg (129 lb 13.6 oz)   Treatment Weight Change -2.6   Post-Hemodialysis Comments Tx complete,

## 2022-11-06 NOTE — ED PROVIDER NOTES
Encounter Date: 2022       History     Chief Complaint   Patient presents with    Chest Pain     Started yesterday    Vomiting    Shortness of Breath    Back Pain    Cough     32-year-old female who has a history of chronic kidney disease on hemodialysis, diabetes, gastroparesis, GERD, hyperkalemia, hypertension, sickle cell trait, presents emergency room accompanied by for with a history that the patient began with midsternal chest pain and vomiting began today.  She complains also some back pain though that is worsened with movement.  No fever.  No changes in activity or trauma.  Patient states she has had a orange colored sputum.  She does not smoke or drink.  She does admit to being oliguric.  She she admits that weight has been stable.  She has denied any earache sore throat nasal or sinus congestion.  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  Her nephrologist is .  Admits to some wheezing.    Review of patient's allergies indicates:   Allergen Reactions    Penicillins Hives     Past Medical History:   Diagnosis Date    CKD (chronic kidney disease), stage IV 2022    Diabetes mellitus due to underlying condition with unspecified complications 2022    Gastroparesis 2022    Heart failure with preserved ejection fraction 2022    EF 55% on 3/22    History of gastroesophageal reflux (GERD)     History of supraventricular tachycardia     Hyperkalemia 2022    Hypertensive emergency 2022    Sickle cell trait 2022    Type 2 diabetes mellitus      Past Surgical History:   Procedure Laterality Date     SECTION      x 3    COLONOSCOPY      COLONOSCOPY N/A 2022    Procedure: COLONOSCOPY;  Surgeon: Jagdeep Cedeno MD;  Location: [facility];  Service: Endoscopy;  Laterality: N/A;    ESOPHAGOGASTRODUODENOSCOPY N/A 10/18/2019    Procedure: ESOPHAGOGASTRODUODENOSCOPY (EGD);  Surgeon: Gianluca Mendez MD;  Location: [facility];  Service: Endoscopy;   Laterality: N/A;    ESOPHAGOGASTRODUODENOSCOPY N/A 08/24/2022    Procedure: EGD (ESOPHAGOGASTRODUODENOSCOPY);  Surgeon: Micky Paredes III, MD;  [facility];  Service: Endoscopy;  Laterality: N/A;    LAPAROSCOPIC CHOLECYSTECTOMY N/A 07/30/2022    Procedure: CHOLECYSTECTOMY, LAPAROSCOPIC;  Surgeon: Grey Perez MD;  Location: [facility];  Service: General;  Laterality: N/A;    PLACEMENT OF DUAL-LUMEN VASCULAR CATHETER Left 07/12/2022    Procedure: INSERTION-CATHETER-JOESPH;  Surgeon: Dionte Gan MD;  Location: [facility] OR;  Service: General;  Laterality: Left;    PLACEMENT OF DUAL-LUMEN VASCULAR CATHETER Right 07/26/2022    Procedure: INSERTION-CATHETER-Hemosplit;  Surgeon: Dionte Gan MD;  Location: [facility] OR;  Service: General;  Laterality: Right;     Family History   Problem Relation Age of Onset    Diabetes Mother     Diabetes Father      Social History     Tobacco Use    Smoking status: Never    Smokeless tobacco: Never   Substance Use Topics    Alcohol use: Not Currently    Drug use: No     Review of Systems   Constitutional:  Negative for activity change, diaphoresis and fever.   HENT:  Negative for congestion, rhinorrhea, sinus pressure, sinus pain, sneezing and sore throat.    Respiratory:  Positive for cough and shortness of breath.    Cardiovascular:  Positive for chest pain.   Gastrointestinal:  Positive for nausea and vomiting.   Genitourinary:  Positive for decreased urine volume.   Musculoskeletal:  Negative for back pain.   Skin:  Negative for color change, pallor and rash.   Neurological:  Negative for weakness.   Hematological:  Does not bruise/bleed easily.   All other systems reviewed and are negative.    Physical Exam     Initial Vitals [11/06/22 0414]   BP Pulse Resp Temp SpO2   (!) 140/89 101 20 98.9 °F (37.2 °C) (!) 90 %      MAP       --         Physical Exam    Vitals reviewed.  Constitutional: She appears well-developed and well-nourished. She is not diaphoretic.  No distress.   Appears uncomfortable, tearful   HENT:   Head: Normocephalic and atraumatic.   Nose: Nose normal.   Mouth/Throat: Oropharynx is clear and moist. No oropharyngeal exudate.   Eyes: Conjunctivae are normal. Pupils are equal, round, and reactive to light.   Neck: Neck supple. No JVD present.   Normal range of motion.  Cardiovascular:  Normal rate, regular rhythm, normal heart sounds and intact distal pulses.     Exam reveals no gallop and no friction rub.       No murmur heard.  Pulmonary/Chest: No respiratory distress. She has wheezes. She has no rhonchi. She has rales. She exhibits no tenderness.   Tunneled catheter in the right chest   Abdominal: Abdomen is soft. Bowel sounds are normal. She exhibits no distension. no abdominal tenderness There is guarding. There is no rebound.   Musculoskeletal:         General: No tenderness or edema. Normal range of motion.      Cervical back: Normal range of motion and neck supple.     Lymphadenopathy:     She has no cervical adenopathy.   Neurological: She is alert and oriented to person, place, and time. She has normal strength. GCS score is 15. GCS eye subscore is 4. GCS verbal subscore is 5. GCS motor subscore is 6.   Skin: Skin is warm and dry. Capillary refill takes less than 2 seconds. No rash noted. No erythema. No pallor.   Psychiatric: She has a normal mood and affect. Her behavior is normal. Judgment and thought content normal.       ED Course   Procedures  Labs Reviewed   CBC W/ AUTO DIFFERENTIAL - Abnormal; Notable for the following components:       Result Value    RBC 2.40 (*)     Hemoglobin 7.2 (*)     Hematocrit 21.5 (*)     RDW 15.3 (*)     Platelets 102 (*)     Lymph # 0.8 (*)     Gran % 78.9 (*)     Lymph % 11.4 (*)     All other components within normal limits   COMPREHENSIVE METABOLIC PANEL - Abnormal; Notable for the following components:    Sodium 135 (*)     Potassium 5.2 (*)     Glucose 63 (*)     BUN 29 (*)     Creatinine 3.7 (*)      Albumin 3.1 (*)     Total Bilirubin 2.4 (*)     Alkaline Phosphatase 185 (*)     AST 98 (*)      (*)     Anion Gap 7 (*)     eGFR 15.9 (*)     All other components within normal limits   TROPONIN I - Abnormal; Notable for the following components:    Troponin I 0.046 (*)     All other components within normal limits   B-TYPE NATRIURETIC PEPTIDE - Abnormal; Notable for the following components:    BNP 4,040 (*)     All other components within normal limits   SARS-COV-2 RNA AMPLIFICATION, QUAL   INFLUENZA A AND B ANTIGEN    Narrative:     Specimen Source->Nasopharyngeal Swab   TROPONIN I   TYPE & SCREEN   PREPARE RBC SOFT        ECG Results              EKG 12-lead (In process)  Result time 11/06/22 05:08:36      In process by Interface, Lab In [facility] (11/06/22 05:08:36)                   Narrative:    Test Reason : R07.9,    Vent. Rate : 098 BPM     Atrial Rate : 098 BPM     P-R Int : 160 ms          QRS Dur : 078 ms      QT Int : 372 ms       P-R-T Axes : 038 -34 047 degrees     QTc Int : 474 ms    Normal sinus rhythm  Left axis deviation  Abnormal ECG  When compared with ECG of 30-OCT-2022 09:30,  T wave amplitude has increased in Anterior leads    Referred By: [email or web address]   SELF           Confirmed By:                                   Imaging Results              X-Ray Chest AP Portable (Final result)  Result time 11/06/22 06:26:38      Final result by Daniel Edmondson MD (11/06/22 06:26:38)                   Narrative:    HISTORY: Chest Pain    FINDINGS: Portable chest radiograph at 0423 hours compared to prior exams shows right internal jugular split hemodialysis catheter unchanged in position, with stable enlarged cardiac silhouette and normal pulmonary vascularity.    The lungs are normally expanded, with no consolidation, large pleural effusion, or evidence of pulmonary edema. Previous interstitial pulmonary edema has resolved. No pneumothorax.    IMPRESSION: Stable cardiomegaly, with no evidence of  acute cardiopulmonary disease.    Electronically signed by:  Daniel Edmondson MD  11/6/2022 6:26 AM CST Workstation: [id number]                                     Medications   aspirin tablet 325 mg (0 mg Oral Hold 11/6/22 5450)   mupirocin 2 % ointment (has no administration in time range)   epoetin teresa-epbx injection 10,000 Units (has no administration in time range)   epoetin teresa-epbx injection 10,000 Units (has no administration in time range)   morphine injection 4 mg (4 mg Intravenous Given 11/6/22 0633)   ondansetron injection 4 mg (4 mg Intravenous Given 11/6/22 0633)                Attending Attestation:             Attending ED Notes:   Chest x-ray showed cardiomegaly with pulmonary vascular congestion.  Screening labs were reviewed the patient's BNP is 4040.  BNP and troponin are elevated but a similar range that had been previously.  Potassium is elevated at 5.2.  Blood sugar 63.  BUN and creatinine is 29 and 3.7 respectively.  Notably today her H&H is 7.2 and 21.5 and 5 days ago it was 10.4 and 28.9.  During the ED course I did speak to Dr. Figueroa plans on dialyzing the patient this morning and given or blood.  Consents for blood were obtained.    Hospital Medicine consulted Dr. Gurrola will be admitting the patient                 Clinical Impression:   Final diagnoses:  [R07.9] Chest pain  [N18.6, Z99.2] Stage 5 chronic kidney disease on chronic dialysis (Primary)  [D64.9] Anemia, unspecified type        ED Disposition Condition    Observation Stable                Ethan Tobin Jr., MD  11/06/22 0751       Ethan Tobin Jr., MD  11/06/22 9402

## 2022-11-06 NOTE — SUBJECTIVE & OBJECTIVE
Past Medical History:   Diagnosis Date    CKD (chronic kidney disease), stage IV 2022    Diabetes mellitus due to underlying condition with unspecified complications 2022    Gastroparesis 2022    Heart failure with preserved ejection fraction 2022    EF 55% on 3/22    History of gastroesophageal reflux (GERD)     History of supraventricular tachycardia     Hyperkalemia 2022    Hypertensive emergency 2022    Sickle cell trait 2022    Type 2 diabetes mellitus        Past Surgical History:   Procedure Laterality Date     SECTION      x 3    COLONOSCOPY      COLONOSCOPY N/A 2022    Procedure: COLONOSCOPY;  Surgeon: Jagdeep Cedeno MD;  Location: [facility];  Service: Endoscopy;  Laterality: N/A;    ESOPHAGOGASTRODUODENOSCOPY N/A 10/18/2019    Procedure: ESOPHAGOGASTRODUODENOSCOPY (EGD);  Surgeon: Gianluca Mendez MD;  Location: [facility];  Service: Endoscopy;  Laterality: N/A;    ESOPHAGOGASTRODUODENOSCOPY N/A 2022    Procedure: EGD (ESOPHAGOGASTRODUODENOSCOPY);  Surgeon: Micky Paredes III, MD;  Location: [facility];  Service: Endoscopy;  Laterality: N/A;    LAPAROSCOPIC CHOLECYSTECTOMY N/A 2022    Procedure: CHOLECYSTECTOMY, LAPAROSCOPIC;  Surgeon: Grey Perez MD;  Location: [facility];  Service: General;  Laterality: N/A;    PLACEMENT OF DUAL-LUMEN VASCULAR CATHETER Left 2022    Procedure: INSERTION-CATHETER-JOSEPH;  Surgeon: Dionte Gan MD;  Location: [facility] OR;  Service: General;  Laterality: Left;    PLACEMENT OF DUAL-LUMEN VASCULAR CATHETER Right 2022    Procedure: INSERTION-CATHETER-Hemosplit;  Surgeon: Dionte Gan MD;  Location: [facility] OR;  Service: General;  Laterality: Right;       Review of patient's allergies indicates:   Allergen Reactions    Penicillins Hives       No current facility-administered medications on file prior to encounter.     Current Outpatient Medications on File Prior to Encounter   Medication Sig    albuterol  (PROVENTIL/VENTOLIN HFA) 90 mcg/actuation inhaler Inhale 2 puffs into the lungs every 6 (six) hours as needed for Wheezing. Rescue    amLODIPine (NORVASC) 10 MG tablet Take 1 tablet (10 mg total) by mouth once daily.    apixaban (ELIQUIS) 5 mg Tab Take 1 tablet (5 mg total) by mouth 2 (two) times daily. For second month on.    carvediloL (COREG) 25 MG tablet Take 1 tablet (25 mg total) by mouth 2 (two) times daily.    cloNIDine 0.2 mg/24 hr td ptwk (CATAPRES) 0.2 mg/24 hr Place 1 patch onto the skin every 7 days.    famotidine (PEPCID) 20 MG tablet Take 1 tablet (20 mg total) by mouth once daily.    FLUoxetine 20 MG capsule Take 1 capsule (20 mg total) by mouth once daily.    furosemide (LASIX) 40 MG tablet Take 1 tablet (40 mg total) by mouth 2 (two) times daily.    gabapentin (NEURONTIN) 100 MG capsule Take 1 capsule (100 mg total) by mouth 3 (three) times daily.    hydrALAZINE (APRESOLINE) 100 MG tablet Take 1 tablet (100 mg total) by mouth every 8 (eight) hours.    HYDROcodone-acetaminophen (NORCO) 5-325 mg per tablet Take 1 tablet by mouth every 6 (six) hours as needed for Pain.    insulin aspart U-100 (NOVOLOG) 100 unit/mL (3 mL) InPn pen Inject 1-10 Units into the skin before meals and at bedtime as needed (Hyperglycemia).    isosorbide mononitrate (IMDUR) 60 MG 24 hr tablet Take 2 tablets (120 mg total) by mouth once daily.    LANTUS U-100 INSULIN 100 unit/mL injection Inject 5 Units into the skin once daily.    levETIRAcetam (KEPPRA) 500 MG Tab Take 1 tablet (500 mg total) by mouth 2 (two) times daily.    ondansetron (ZOFRAN-ODT) 4 MG TbDL Dissolve 1 tablet (4 mg total) by mouth every 8 (eight) hours as needed (nausea, vomiting).    pantoprazole (PROTONIX) 40 MG tablet Take 1 tablet (40 mg total) by mouth once daily.    promethazine (PHENERGAN) 25 MG tablet Take 1 tablet (25 mg total) by mouth every 6 (six) hours as needed for Nausea.    [DISCONTINUED] atenoloL (TENORMIN) 50 MG tablet Take 1 tablet (50 mg  total) by mouth every other day.    [DISCONTINUED] omeprazole (PRILOSEC) 20 MG capsule Take 2 capsules (40 mg total) by mouth once daily. for 10 days    [DISCONTINUED] torsemide (DEMADEX) 20 MG Tab Take 1 tablet (20 mg total) by mouth 2 (two) times a day. (Patient taking differently: Take 20 mg by mouth once daily.)     Family History       Problem Relation (Age of Onset)    Diabetes Mother, Father          Tobacco Use    Smoking status: Never    Smokeless tobacco: Never   Substance and Sexual Activity    Alcohol use: Not Currently    Drug use: No    Sexual activity: Not Currently     Partners: Male     Birth control/protection: I.U.D.     Review of Systems   Constitutional:  Negative for activity change and fever.   Respiratory:  Positive for wheezing. Negative for shortness of breath.    Cardiovascular:  Positive for chest pain. Negative for leg swelling.   Gastrointestinal:  Negative for abdominal distention and abdominal pain.   Genitourinary:  Negative for difficulty urinating.   Skin:  Negative for color change.   Neurological:  Negative for dizziness and facial asymmetry.   Psychiatric/Behavioral:  Negative for agitation and confusion.    All other systems reviewed and are negative.  Objective:     Vital Signs (Most Recent):  Temp: 98.8 °F (37.1 °C) (11/06/22 0931)  Pulse: 97 (11/06/22 0931)  Resp: (!) 22 (11/06/22 1046)  BP: (!) 146/97 (11/06/22 0931)  SpO2: (!) 94 % (11/06/22 0931)   Vital Signs (24h Range):  Temp:  [98.8 °F (37.1 °C)-98.9 °F (37.2 °C)] 98.8 °F (37.1 °C)  Pulse:  [] 97  Resp:  [12-26] 22  SpO2:  [90 %-98 %] 94 %  BP: (140-152)/() 146/97     Weight: 59.9 kg (132 lb)  Body mass index is 24.14 kg/m².    Physical Exam  Vitals and nursing note reviewed.   HENT:      Head: Normocephalic and atraumatic.      Mouth/Throat:      Mouth: Mucous membranes are moist.   Eyes:      Conjunctiva/sclera: Conjunctivae normal.   Neck:      Vascular: No JVD.   Cardiovascular:      Rate and Rhythm:  Normal rate.      Heart sounds: Normal heart sounds.   Pulmonary:      Effort: Pulmonary effort is normal.      Breath sounds: Normal breath sounds.   Abdominal:      General: Bowel sounds are normal.      Palpations: Abdomen is soft.   Musculoskeletal:      Cervical back: Normal range of motion.   Skin:     General: Skin is warm.   Neurological:      Mental Status: She is alert and oriented to person, place, and time.           Significant Labs: All pertinent labs within the past 24 hours have been reviewed.  BMP:   Recent Labs   Lab 11/06/22 0432   GLU 63*   *   K 5.2*   CL 99   CO2 29   BUN 29*   CREATININE 3.7*   CALCIUM 8.7     CBC:   Recent Labs   Lab 11/06/22 0432   WBC 7.39   HGB 7.2*   HCT 21.5*   *     CMP:   Recent Labs   Lab 11/06/22 0432   *   K 5.2*   CL 99   CO2 29   GLU 63*   BUN 29*   CREATININE 3.7*   CALCIUM 8.7   PROT 6.6   ALBUMIN 3.1*   BILITOT 2.4*   ALKPHOS 185*   AST 98*   *   ANIONGAP 7*       Significant Imaging: I have reviewed all pertinent imaging results/findings within the past 24 hours.

## 2022-11-06 NOTE — H&P
"[facility]  History & Physical    Patient Name: Tabby Howard  MRN: [id number]  Patient Class: OP- Observation  Admission Date: 11/6/2022  Attending Physician: Armani Gurrola MD   Primary Care Provider: Primary Doctor No         Patient information was obtained from patient and ER records.     Subjective:     Principal Problem:<principal problem not specified>    Chief Complaint:   Chief Complaint   Patient presents with    Chest Pain     Started yesterday    Vomiting    Shortness of Breath    Back Pain    Cough        HPI:   31 y/o F w/ hx of ESRD on HD, DM, HFpEF, GERD, suspected gastroparesis, sickle cell trait, HTN, with a recent episode of C diff . Abnormal FANNIE ,   Recently DC form [facility]  with  R eye vision los  likely due to proliferative retinopathy and uncontrolled DM and HTN, was admitted with pulmonary edema and midsternal chest pain and vomiting.  She was visiting to her parents from [place] to [place] and having chest pain with vomiting this morning accompanied with some back pain which is chronic but worsened and came to emergency room.  He no fever and no history of trauma and patient report colored sputum and patient was having wheezing and chest x-ray with pulmonary edema and admitted.  Nephrology was consulted and doing urgent dialysis.  Blood work with a hemoglobin 7.2, hyperkalemia 5.2 and BNP is severely elevated and mild elevated troponin.  Patient is having a lot of pain but not in the chart is but in the back.  EKG did not show any ischemia and despite reported a lot of pains but patient heart rate is normal.  As chart review her BNP is elevated at since is last month around 4000 5000 and chronically elevated troponins.  As per previous admission , MD note with "She has very dramatic behavior, likes only IV Dilaudid"             Past Medical History:   Diagnosis Date    CKD (chronic kidney disease), stage IV 4/12/2022    Diabetes mellitus due to " underlying condition with unspecified complications 2022    Gastroparesis 2022    Heart failure with preserved ejection fraction 2022    EF 55% on 3/22    History of gastroesophageal reflux (GERD)     History of supraventricular tachycardia     Hyperkalemia 2022    Hypertensive emergency 2022    Sickle cell trait 2022    Type 2 diabetes mellitus        Past Surgical History:   Procedure Laterality Date     SECTION      x 3    COLONOSCOPY      COLONOSCOPY N/A 2022    Procedure: COLONOSCOPY;  Surgeon: Jagdeep Cedeno MD;  Location: [facility];  Service: Endoscopy;  Laterality: N/A;    ESOPHAGOGASTRODUODENOSCOPY N/A 10/18/2019    Procedure: ESOPHAGOGASTRODUODENOSCOPY (EGD);  Surgeon: Gianluca Mendez MD;  Location: [facility];  Service: Endoscopy;  Laterality: N/A;    ESOPHAGOGASTRODUODENOSCOPY N/A 2022    Procedure: EGD (ESOPHAGOGASTRODUODENOSCOPY);  Surgeon: Micky Paredes III, MD;  Location: [facility];  Service: Endoscopy;  Laterality: N/A;    LAPAROSCOPIC CHOLECYSTECTOMY N/A 2022    Procedure: CHOLECYSTECTOMY, LAPAROSCOPIC;  Surgeon: Grey Perez MD;  Location: [facility];  Service: General;  Laterality: N/A;    PLACEMENT OF DUAL-LUMEN VASCULAR CATHETER Left 2022    Procedure: INSERTION-CATHETER-JOSEPH;  Surgeon: Dionte Gan MD;  Location: [facility] OR;  Service: General;  Laterality: Left;    PLACEMENT OF DUAL-LUMEN VASCULAR CATHETER Right 2022    Procedure: INSERTION-CATHETER-Hemosplit;  Surgeon: Dionte Gan MD;  Location: [facility] OR;  Service: General;  Laterality: Right;       Review of patient's allergies indicates:   Allergen Reactions    Penicillins Hives       No current facility-administered medications on file prior to encounter.     Current Outpatient Medications on File Prior to Encounter   Medication Sig    albuterol (PROVENTIL/VENTOLIN HFA) 90 mcg/actuation inhaler Inhale 2 puffs into the lungs every 6 (six) hours as needed for  Wheezing. Rescue    amLODIPine (NORVASC) 10 MG tablet Take 1 tablet (10 mg total) by mouth once daily.    apixaban (ELIQUIS) 5 mg Tab Take 1 tablet (5 mg total) by mouth 2 (two) times daily. For second month on.    carvediloL (COREG) 25 MG tablet Take 1 tablet (25 mg total) by mouth 2 (two) times daily.    cloNIDine 0.2 mg/24 hr td ptwk (CATAPRES) 0.2 mg/24 hr Place 1 patch onto the skin every 7 days.    famotidine (PEPCID) 20 MG tablet Take 1 tablet (20 mg total) by mouth once daily.    FLUoxetine 20 MG capsule Take 1 capsule (20 mg total) by mouth once daily.    furosemide (LASIX) 40 MG tablet Take 1 tablet (40 mg total) by mouth 2 (two) times daily.    gabapentin (NEURONTIN) 100 MG capsule Take 1 capsule (100 mg total) by mouth 3 (three) times daily.    hydrALAZINE (APRESOLINE) 100 MG tablet Take 1 tablet (100 mg total) by mouth every 8 (eight) hours.    HYDROcodone-acetaminophen (NORCO) 5-325 mg per tablet Take 1 tablet by mouth every 6 (six) hours as needed for Pain.    insulin aspart U-100 (NOVOLOG) 100 unit/mL (3 mL) InPn pen Inject 1-10 Units into the skin before meals and at bedtime as needed (Hyperglycemia).    isosorbide mononitrate (IMDUR) 60 MG 24 hr tablet Take 2 tablets (120 mg total) by mouth once daily.    LANTUS U-100 INSULIN 100 unit/mL injection Inject 5 Units into the skin once daily.    levETIRAcetam (KEPPRA) 500 MG Tab Take 1 tablet (500 mg total) by mouth 2 (two) times daily.    ondansetron (ZOFRAN-ODT) 4 MG TbDL Dissolve 1 tablet (4 mg total) by mouth every 8 (eight) hours as needed (nausea, vomiting).    pantoprazole (PROTONIX) 40 MG tablet Take 1 tablet (40 mg total) by mouth once daily.    promethazine (PHENERGAN) 25 MG tablet Take 1 tablet (25 mg total) by mouth every 6 (six) hours as needed for Nausea.    [DISCONTINUED] atenoloL (TENORMIN) 50 MG tablet Take 1 tablet (50 mg total) by mouth every other day.    [DISCONTINUED] omeprazole (PRILOSEC) 20 MG capsule Take 2 capsules (40 mg  total) by mouth once daily. for 10 days    [DISCONTINUED] torsemide (DEMADEX) 20 MG Tab Take 1 tablet (20 mg total) by mouth 2 (two) times a day. (Patient taking differently: Take 20 mg by mouth once daily.)     Family History       Problem Relation (Age of Onset)    Diabetes Mother, Father          Tobacco Use    Smoking status: Never    Smokeless tobacco: Never   Substance and Sexual Activity    Alcohol use: Not Currently    Drug use: No    Sexual activity: Not Currently     Partners: Male     Birth control/protection: I.U.D.     Review of Systems   Constitutional:  Negative for activity change and fever.   Respiratory:  Positive for wheezing. Negative for shortness of breath.    Cardiovascular:  Positive for chest pain. Negative for leg swelling.   Gastrointestinal:  Negative for abdominal distention and abdominal pain.   Genitourinary:  Negative for difficulty urinating.   Skin:  Negative for color change.   Neurological:  Negative for dizziness and facial asymmetry.   Psychiatric/Behavioral:  Negative for agitation and confusion.    All other systems reviewed and are negative.  Objective:     Vital Signs (Most Recent):  Temp: 98.8 °F (37.1 °C) (11/06/22 0931)  Pulse: 97 (11/06/22 0931)  Resp: (!) 22 (11/06/22 1046)  BP: (!) 146/97 (11/06/22 0931)  SpO2: (!) 94 % (11/06/22 0931)   Vital Signs (24h Range):  Temp:  [98.8 °F (37.1 °C)-98.9 °F (37.2 °C)] 98.8 °F (37.1 °C)  Pulse:  [] 97  Resp:  [12-26] 22  SpO2:  [90 %-98 %] 94 %  BP: (140-152)/() 146/97     Weight: 59.9 kg (132 lb)  Body mass index is 24.14 kg/m².    Physical Exam  Vitals and nursing note reviewed.   HENT:      Head: Normocephalic and atraumatic.      Mouth/Throat:      Mouth: Mucous membranes are moist.   Eyes:      Conjunctiva/sclera: Conjunctivae normal.   Neck:      Vascular: No JVD.   Cardiovascular:      Rate and Rhythm: Normal rate.      Heart sounds: Normal heart sounds.   Pulmonary:      Effort: Pulmonary effort is normal.       Breath sounds: Normal breath sounds.   Abdominal:      General: Bowel sounds are normal.      Palpations: Abdomen is soft.   Musculoskeletal:      Cervical back: Normal range of motion.   Skin:     General: Skin is warm.   Neurological:      Mental Status: She is alert and oriented to person, place, and time.           Significant Labs: All pertinent labs within the past 24 hours have been reviewed.  BMP:   Recent Labs   Lab 11/06/22 0432   GLU 63*   *   K 5.2*   CL 99   CO2 29   BUN 29*   CREATININE 3.7*   CALCIUM 8.7     CBC:   Recent Labs   Lab 11/06/22 0432   WBC 7.39   HGB 7.2*   HCT 21.5*   *     CMP:   Recent Labs   Lab 11/06/22 0432   *   K 5.2*   CL 99   CO2 29   GLU 63*   BUN 29*   CREATININE 3.7*   CALCIUM 8.7   PROT 6.6   ALBUMIN 3.1*   BILITOT 2.4*   ALKPHOS 185*   AST 98*   *   ANIONGAP 7*       Significant Imaging: I have reviewed all pertinent imaging results/findings within the past 24 hours.    Assessment/Plan:     Active Hospital Problems    Diagnosis    Anemia, acute on chronic    Thrombocytopenia    Volume overload with pulmonary edema    Primary hypertension    Deep vein thrombosis (DVT) of non-extremity vein    Pulmonary edema    Acute hypoxemic respiratory failure    ESRD (end stage renal disease)    Seizure    Diabetes mellitus due to underlying condition with unspecified complications    Pericardial effusion    Type 2 diabetes mellitus with chronic kidney disease on chronic dialysis, with long-term current use of insulin    Gallstones    Gastritis         PLAN   For HD today   Continue lasix   Continue home meds  And home pain killer   Trend cardiac enz , chronically elevated  Follow nephrology   Follow up outpatient for her eyes issues as OP . Patient has appointment   Possible need transfusion with HD . Hyperkalemia should improve with HD   Patient developed fever in ER and blood culture and antibiotics started   US abdomen for elevated LFT and bili  Late  admit , patient complaint the same and CT abdomen pel was done and no acute pathology       VTE Risk Mitigation (From admission, onward)           Ordered     apixaban tablet 5 mg  2 times daily         11/06/22 0754     IP VTE HIGH RISK PATIENT  Once         11/06/22 0754                       Armani Gurrola MD  Department of Hospital Medicine   [facility]

## 2022-11-06 NOTE — PROGRESS NOTES
Automatic Inhaler to Nebulizer Interchange    albuterol (Ventolin, ProAir, or Proventil)  mcg given multiple times per day changed to albuterol 2.5 mg q6h prn wheezing  per [facility] Automatic Therapeutic Sustitutions Protocol.    Please contact pharmacy at extension [phone] with any questions.     Thank you,   Noreen Yu

## 2022-11-06 NOTE — PLAN OF CARE
Patient was off of the floor for testing so spoke with Lee Tanya (step-mom)  [phone] (Mobile) on phone to complete discharge assessment. Tanya [facility] patient is currently staying with her parents in [place] at [address], [place] [address]. [facility] [place] patient is currently on dialysis and uses [facility] at [address] [place] [address] and is on M-W-F schedule.   [facility]  Initial Discharge Assessment       Primary Care Provider: Primary Doctor No    Admission Diagnosis: Stage 5 chronic kidney disease on chronic dialysis [N18.6, Z99.2]    Admission Date: 11/6/2022  Expected Discharge Date:     Discharge Barriers Identified: (P) None    Payor: MEDICAID / Plan: HEALTHY BLUE ([employer]erv) / Product Type: Managed Medicaid /     Extended Emergency Contact Information  Primary Emergency Contact: Garcia Howard  Mobile Phone: [phone]  Relation: Father  Preferred language: English   needed? No  Secondary Emergency Contact: Tanya Howard  Mobile Phone: [phone]  Relation: Step parent  Preferred language: English   needed? No    Discharge Plan A: (P) Home with family  Discharge Plan B: (P) Home with family      [facility]  [address]  [facility] [address]  Phone: [phone] Fax: [phone]      Initial Assessment (most recent)       Adult Discharge Assessment - 11/06/22 1558          Discharge Assessment    Assessment Type Discharge Planning Assessment (P)      Confirmed/corrected address, phone number and insurance Yes (P)      Confirmed Demographics Correct on Facesheet (P)    Address on facesheet is patient's mailing/home address but she is currently staying with her parents in [place] at [address], [place], [id number]    Source of Information family (P)      Reason For Admission pulmonary edema (P)      Lives With parent(s) (P)      Facility Arrived From: Parent's house at [address], [place] [address] (P)       Do you expect to return to your current living situation? Yes (P)      Do you have help at home or someone to help you manage your care at home? Yes (P)      Who are your caregiver(s) and their phone number(s)? Garcia Howard (Father) and Tanya Howard (step-mom)  [phone] (Mobile) (P)      Prior to hospitilization cognitive status: Unable to Assess (P)      Current cognitive status: Unable to Assess (P)      Walking or Climbing Stairs Difficulty ambulation difficulty, requires equipment;transferring difficulty, requires equipment (P)      Dressing/Bathing Difficulty none (P)      Equipment Currently Used at Home walker, rolling (P)      Readmission within 30 days? No (P)      Patient currently being followed by outpatient case management? No (P)      Do you currently have service(s) that help you manage your care at home? No (P)      Do you take prescription medications? Yes (P)      Do you have prescription coverage? Yes (P)      Coverage Medicaid (P)      Do you have any problems affording any of your prescribed medications? No (P)      Who is going to help you get home at discharge? Garcia Howard (Father) and Tanya Howard (step-mom)  [phone] (Mobile) (P)      How do you get to doctors appointments? family or friend will provide (P)      Are you on dialysis? Yes (P)      Dialysis Name and Scheduled days [facility] at [address] [place] [address] on M-W-F schedule (P)      Discharge Plan A Home with family (P)      Discharge Plan B Home with family (P)      DME Needed Upon Discharge  none (P)      Discharge Plan discussed with: Parent(s) (P)      Name(s) and Number(s) Garcia Howard (Father) and Tanya Howard (step-mom)  [phone] (Mobile) (P)      Discharge Barriers Identified None (P)         Relationship/Environment    Name(s) of Who Lives With Patient Garcia Howard (Father) and Tanya Howard (step-mom)  [phone] (Mobile) (P)

## 2022-11-06 NOTE — CONSULTS
INPATIENT NEPHROLOGY CONSULT   [facility] NEPHROLOGY    Tabby Howard  11/06/2022    Reason for consultation:    esrd    Chief Complaint:   Chief Complaint   Patient presents with    Chest Pain     Started yesterday    Vomiting    Shortness of Breath    Back Pain    Cough          History of Present Illness:    Per H and P  32-year-old female who has a history of chronic kidney disease on hemodialysis, diabetes, gastroparesis, GERD, hyperkalemia, hypertension, sickle cell trait, presents emergency room accompanied by for with a history that the patient began with midsternal chest pain and vomiting began today.  She complains also some back pain though that is worsened with movement.  No fever.  No changes in activity or trauma.  Patient states she has had a orange colored sputum.  She does not smoke or drink.  She does admit to being oliguric.  She she admits that weight has been stable.  She has denied any earache sore throat nasal or sinus congestion.  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  Her nephrologist is .  Admits to some wheezing.        Plan of Care:       Assessment:    esrd  --continue dialysis per routine  --fluid restrict  --renal dose medication per routine  --continue outpt medication  --continue binders with meals    Anemia--  --erythropoiesis stimulating agent with renal replacement therapy  --transfuse with hd today (supplemental hd today)  --check ldh, haptoglobin, iron panel    Hyperphosphatemia  --renal diet  --continue binders    Hypertension  --uf with hd  --fluid restrict  --low salt diet  --continue home medication    Hyperkalemia'  --renal diet  --2 k dialysate    Abdominal pain  --per primary    Pericardial effusion  --uf as able         Thank you for allowing us to participate in this patient's care. We will continue to follow.    Vital Signs:  Temp Readings from Last 3 Encounters:   11/06/22 98.8 °F (37.1 °C) (Oral)   11/01/22 98.6 °F (37 °C) (Oral)   10/29/22 98 °F  (36.7 °C) (Skin)       Pulse Readings from Last 3 Encounters:   22 97   22 95   10/29/22 86       BP Readings from Last 3 Encounters:   22 (!) 146/97   22 134/79   10/29/22 (!) 162/89       Weight:  Wt Readings from Last 3 Encounters:   22 59.9 kg (132 lb)   10/31/22 55.2 kg (121 lb 11.1 oz)   10/26/22 59 kg (130 lb)       Past Medical & Surgical History:  Past Medical History:   Diagnosis Date    CKD (chronic kidney disease), stage IV 2022    Diabetes mellitus due to underlying condition with unspecified complications 2022    Gastroparesis 2022    Heart failure with preserved ejection fraction 2022    EF 55% on 3/22    History of gastroesophageal reflux (GERD)     History of supraventricular tachycardia     Hyperkalemia 2022    Hypertensive emergency 2022    Sickle cell trait 2022    Type 2 diabetes mellitus        Past Surgical History:   Procedure Laterality Date     SECTION      x 3    COLONOSCOPY      COLONOSCOPY N/A 2022    Procedure: COLONOSCOPY;  Surgeon: Jagdeep Cedeno MD;  Location: [facility];  Service: Endoscopy;  Laterality: N/A;    ESOPHAGOGASTRODUODENOSCOPY N/A 10/18/2019    Procedure: ESOPHAGOGASTRODUODENOSCOPY (EGD);  Surgeon: Gianluca Mendez MD;  Location: [facility];  Service: Endoscopy;  Laterality: N/A;    ESOPHAGOGASTRODUODENOSCOPY N/A 2022    Procedure: EGD (ESOPHAGOGASTRODUODENOSCOPY);  Surgeon: Micky Paredes III, MD;  Location: [facility];  Service: Endoscopy;  Laterality: N/A;    LAPAROSCOPIC CHOLECYSTECTOMY N/A 2022    Procedure: CHOLECYSTECTOMY, LAPAROSCOPIC;  Surgeon: Grey Perez MD;  Location: [facility];  Service: General;  Laterality: N/A;    PLACEMENT OF DUAL-LUMEN VASCULAR CATHETER Left 2022    Procedure: INSERTION-CATHETER-JOSEPH;  Surgeon: Dionte Gan MD;  Location: [facility] OR;  Service: General;  Laterality: Left;    PLACEMENT OF DUAL-LUMEN VASCULAR CATHETER Right 2022     Procedure: INSERTION-CATHETER-Hemosplit;  Surgeon: Dionte Gan MD;  Location: [facility];  Service: General;  Laterality: Right;       Past Social History:  Social History     Socioeconomic History    Marital status:    Tobacco Use    Smoking status: Never    Smokeless tobacco: Never   Substance and Sexual Activity    Alcohol use: Not Currently    Drug use: No    Sexual activity: Not Currently     Partners: Male     Birth control/protection: I.U.D.     Social Determinants of Health     Financial Resource Strain: Unknown    Difficulty of Paying Living Expenses: Patient refused   Food Insecurity: Unknown    Worried About Running Out of Food in the Last Year: Patient refused    Ran Out of Food in the Last Year: Patient refused   Transportation Needs: Unknown    Lack of Transportation (Medical): Patient refused    Lack of Transportation (Non-Medical): Patient refused   Physical Activity: Unknown    Days of Exercise per Week: Patient refused    Minutes of Exercise per Session: Patient refused   Stress: Unknown    Feeling of Stress : Patient refused   Social Connections: Unknown    Frequency of Communication with Friends and Family: Patient refused    Frequency of Social Gatherings with Friends and Family: Patient refused    Attends [employer] Services: Patient refused    Active Member of Clubs or Organizations: Patient refused    Attends Club or Organization Meetings: Patient refused    Marital Status: Patient refused   Housing Stability: Unknown    Unable to Pay for Housing in the Last Year: Patient refused    Unstable Housing in the Last Year: Patient refused       Medications:  No current facility-administered medications on file prior to encounter.     Current Outpatient Medications on File Prior to Encounter   Medication Sig Dispense Refill    albuterol (PROVENTIL/VENTOLIN HFA) 90 mcg/actuation inhaler Inhale 2 puffs into the lungs every 6 (six) hours as needed for Wheezing. Rescue      amLODIPine (NORVASC)  10 MG tablet Take 1 tablet (10 mg total) by mouth once daily. 30 tablet 11    apixaban (ELIQUIS) 5 mg Tab Take 1 tablet (5 mg total) by mouth 2 (two) times daily. For second month on. 60 tablet 2    carvediloL (COREG) 25 MG tablet Take 1 tablet (25 mg total) by mouth 2 (two) times daily. 60 tablet 2    cloNIDine 0.2 mg/24 hr td ptwk (CATAPRES) 0.2 mg/24 hr Place 1 patch onto the skin every 7 days. 4 patch 2    famotidine (PEPCID) 20 MG tablet Take 1 tablet (20 mg total) by mouth once daily. 30 tablet 0    FLUoxetine 20 MG capsule Take 1 capsule (20 mg total) by mouth once daily. 30 capsule 1    furosemide (LASIX) 40 MG tablet Take 1 tablet (40 mg total) by mouth 2 (two) times daily. 60 tablet 0    gabapentin (NEURONTIN) 100 MG capsule Take 1 capsule (100 mg total) by mouth 3 (three) times daily. 90 capsule 2    hydrALAZINE (APRESOLINE) 100 MG tablet Take 1 tablet (100 mg total) by mouth every 8 (eight) hours. 90 tablet 2    HYDROcodone-acetaminophen (NORCO) 5-325 mg per tablet Take 1 tablet by mouth every 6 (six) hours as needed for Pain. 16 tablet 0    insulin aspart U-100 (NOVOLOG) 100 unit/mL (3 mL) InPn pen Inject 1-10 Units into the skin before meals and at bedtime as needed (Hyperglycemia). 3 mL 3    isosorbide mononitrate (IMDUR) 60 MG 24 hr tablet Take 2 tablets (120 mg total) by mouth once daily. 30 tablet 1    LANTUS U-100 INSULIN 100 unit/mL injection Inject 5 Units into the skin once daily.      levETIRAcetam (KEPPRA) 500 MG Tab Take 1 tablet (500 mg total) by mouth 2 (two) times daily. 60 tablet 1    ondansetron (ZOFRAN-ODT) 4 MG TbDL Dissolve 1 tablet (4 mg total) by mouth every 8 (eight) hours as needed (nausea, vomiting). 12 tablet 0    pantoprazole (PROTONIX) 40 MG tablet Take 1 tablet (40 mg total) by mouth once daily. 30 tablet 0    promethazine (PHENERGAN) 25 MG tablet Take 1 tablet (25 mg total) by mouth every 6 (six) hours as needed for Nausea. 10 tablet 0    [DISCONTINUED] atenoloL (TENORMIN)  "50 MG tablet Take 1 tablet (50 mg total) by mouth every other day. 45 tablet 3    [DISCONTINUED] omeprazole (PRILOSEC) 20 MG capsule Take 2 capsules (40 mg total) by mouth once daily. for 10 days 20 capsule 0    [DISCONTINUED] torsemide (DEMADEX) 20 MG Tab Take 1 tablet (20 mg total) by mouth 2 (two) times a day. (Patient taking differently: Take 20 mg by mouth once daily.) 60 tablet 1     Scheduled Meds:   sodium chloride 0.9%   Intravenous Once    sodium chloride 0.9%   Intravenous Once    amLODIPine  10 mg Oral Daily    apixaban  5 mg Oral BID    aspirin  325 mg Oral ED 1 Time    carvediloL  25 mg Oral BID    cloNIDine 0.2 mg/24 hr td ptwk  1 patch Transdermal Q7 Days    [START ON 11/7/2022] epoetin teresa-epbx  10,000 Units Subcutaneous Every Mon, Wed, Fri    epoetin teresa-epbx  10,000 Units Subcutaneous Once    FLUoxetine  20 mg Oral Daily    furosemide  40 mg Oral BID    gabapentin  100 mg Oral TID    hydrALAZINE  100 mg Oral Q8H    insulin detemir U-100  5 Units Subcutaneous Daily    isosorbide mononitrate  120 mg Oral Daily    levETIRAcetam  500 mg Oral BID    mupirocin   Nasal BID     Continuous Infusions:  PRN Meds:.albuterol sulfate, dextrose 10%, dextrose 10%, glucagon (human recombinant), glucose, glucose, HYDROcodone-acetaminophen, naloxone, sodium chloride 0.9%, sodium chloride 0.9%, sodium chloride 0.9%    Allergies:  Penicillins    Past Family History:  Reviewed; refer to Hospitalist Admission Note    Review of Systems:  Review of Systems - All 14 systems reviewed and negative, except as noted in HPI    Physical Exam:    BP (!) 146/97 (BP Location: Right arm, Patient Position: Lying)   Pulse 97   Temp 98.8 °F (37.1 °C) (Oral)   Resp 20   Ht 5' 2" (1.575 m)   Wt 59.9 kg (132 lb)   LMP  (LMP Unknown)   SpO2 (!) 94%   BMI 24.14 kg/m²     General Appearance:    tearful   Head:    Normocephalic, without obvious abnormality, atraumatic   Eyes:    PER, conjunctiva/corneas clear, EOM's intact in both " eyes        Throat:   Lips, mucosa, and tongue normal; teeth and gums normal   Back:     Symmetric, no curvature, ROM normal, no CVA tenderness   Lungs:     Clear to auscultation bilaterally, respirations unlabored   Chest wall:    No tenderness or deformity   Heart:    Regular rate and rhythm, S1 and S2 normal, no murmur, rub   or gallop   Abdomen:     Tender but no rebound. Pos bs.  Some degree of distractibility    Extremities:   Extremities normal, atraumatic, no cyanosis or edema   Pulses:   2+ and symmetric all extremities   MSK:   No joint or muscle swelling, tenderness or deformity   Skin:   Skin color, texture, turgor normal, no rashes or lesions   Neurologic:   CNII-XII intact, normal strength and sensation       Throughout.  No flap     Results:  Lab Results   Component Value Date     (L) 11/06/2022    K 5.2 (H) 11/06/2022    CL 99 11/06/2022    CO2 29 11/06/2022    BUN 29 (H) 11/06/2022    CREATININE 3.7 (H) 11/06/2022    CALCIUM 8.7 11/06/2022    ANIONGAP 7 (L) 11/06/2022    ESTGFRAFRICA 20 (A) 07/31/2022    EGFRNONAA 18 (A) 07/31/2022       Lab Results   Component Value Date    CALCIUM 8.7 11/06/2022    PHOS 2.3 (L) 11/01/2022       Recent Labs   Lab 11/06/22  0432   WBC 7.39   RBC 2.40*   HGB 7.2*   HCT 21.5*   *   MCV 87   MCH 30.0   MCHC 33.5          I have personally reviewed pertinent radiological imaging and reports.    Patient care was time spent personally by me on the following activities:   Obtaining a history  Examination of patient.  Providing medical care at the patients bedside.  Developing a treatment plan with patient or surrogate and bedside caregivers  Ordering and reviewing laboratory studies, radiographic studies, pulse oximetry.  Ordering and performing treatments and interventions.  Evaluation of patient's response to treatment.  Discussions with consultants while on the unit and immediately available to the patient.  Re-evaluation of the patient's  condition.  Documentation in the medical record.     Hugh Figueroa MD  Nephrology  [facility] [place] [place]  [phone]

## 2022-11-06 NOTE — HPI
"  33 y/o F w/ hx of ESRD on HD, DM, HFpEF, GERD, suspected gastroparesis, sickle cell trait, HTN, with a recent episode of C diff . Abnormal FANNIE ,   Recently DC form [facility]  with  R eye vision los  likely due to proliferative retinopathy and uncontrolled DM and HTN, was admitted with pulmonary edema and midsternal chest pain and vomiting.  She was visiting to her parents from Neurology to [place] and having chest pain with vomiting this morning accompanied with some back pain which is chronic but worsened and came to emergency room.  He no fever and no history of trauma and patient report colored sputum and patient was having wheezing and chest x-ray with pulmonary edema and admitted.  Nephrology was consulted and doing urgent dialysis.  Blood work with a hemoglobin 7.2, hyperkalemia 5.2 and BNP is severely elevated and mild elevated troponin.  Patient is having a lot of pain but not in the chart is but in the back.  EKG did not show any ischemia and despite reported a lot of pains but patient heart rate is normal.  As chart review her BNP is elevated at since is last month around 4000 5000 and chronically elevated troponins.  As per previous admission , MD note with "She has very dramatic behavior, likes only IV Dilaudid"         "

## 2022-11-07 VITALS
SYSTOLIC BLOOD PRESSURE: 143 MMHG | WEIGHT: 132 LBS | BODY MASS INDEX: 24.29 KG/M2 | DIASTOLIC BLOOD PRESSURE: 97 MMHG | HEART RATE: 87 BPM | TEMPERATURE: 99 F | HEIGHT: 62 IN | RESPIRATION RATE: 20 BRPM | OXYGEN SATURATION: 98 %

## 2022-11-07 LAB
ANION GAP SERPL CALC-SCNC: 8 MMOL/L (ref 8–16)
BASOPHILS # BLD AUTO: 0.03 K/UL (ref 0–0.2)
BASOPHILS NFR BLD: 0.5 % (ref 0–1.9)
BUN SERPL-MCNC: 22 MG/DL (ref 6–20)
CALCIUM SERPL-MCNC: 8.6 MG/DL (ref 8.7–10.5)
CHLORIDE SERPL-SCNC: 107 MMOL/L (ref 95–110)
CO2 SERPL-SCNC: 21 MMOL/L (ref 23–29)
CREAT SERPL-MCNC: 3.1 MG/DL (ref 0.5–1.4)
DIFFERENTIAL METHOD: ABNORMAL
EOSINOPHIL # BLD AUTO: 0.1 K/UL (ref 0–0.5)
EOSINOPHIL NFR BLD: 0.8 % (ref 0–8)
ERYTHROCYTE [DISTWIDTH] IN BLOOD BY AUTOMATED COUNT: 16.6 % (ref 11.5–14.5)
EST. GFR  (NO RACE VARIABLE): 19.6 ML/MIN/1.73 M^2
FERRITIN SERPL-MCNC: 4588 NG/ML (ref 20–300)
GLUCOSE SERPL-MCNC: 186 MG/DL (ref 70–110)
GLUCOSE SERPL-MCNC: 242 MG/DL (ref 70–110)
GLUCOSE SERPL-MCNC: 354 MG/DL (ref 70–110)
HCT VFR BLD AUTO: 29 % (ref 37–48.5)
HGB BLD-MCNC: 9.4 G/DL (ref 12–16)
IMM GRANULOCYTES # BLD AUTO: 0.03 K/UL (ref 0–0.04)
IMM GRANULOCYTES NFR BLD AUTO: 0.5 % (ref 0–0.5)
IRON SERPL-MCNC: 43 UG/DL (ref 30–160)
LDH SERPL L TO P-CCNC: 318 U/L (ref 110–260)
LYMPHOCYTES # BLD AUTO: 0.8 K/UL (ref 1–4.8)
LYMPHOCYTES NFR BLD: 13 % (ref 18–48)
MCH RBC QN AUTO: 28.5 PG (ref 27–31)
MCHC RBC AUTO-ENTMCNC: 32.4 G/DL (ref 32–36)
MCV RBC AUTO: 88 FL (ref 82–98)
MONOCYTES # BLD AUTO: 0.5 K/UL (ref 0.3–1)
MONOCYTES NFR BLD: 8.3 % (ref 4–15)
NEUTROPHILS # BLD AUTO: 5 K/UL (ref 1.8–7.7)
NEUTROPHILS NFR BLD: 76.9 % (ref 38–73)
NRBC BLD-RTO: 0 /100 WBC
PLATELET # BLD AUTO: 109 K/UL (ref 150–450)
PMV BLD AUTO: 10.2 FL (ref 9.2–12.9)
POTASSIUM SERPL-SCNC: 4.9 MMOL/L (ref 3.5–5.1)
RBC # BLD AUTO: 3.3 M/UL (ref 4–5.4)
SATURATED IRON: 21 % (ref 20–50)
SODIUM SERPL-SCNC: 136 MMOL/L (ref 136–145)
TOTAL IRON BINDING CAPACITY: 209 UG/DL (ref 250–450)
TRANSFERRIN SERPL-MCNC: 149 MG/DL (ref 200–375)
WBC # BLD AUTO: 6.47 K/UL (ref 3.9–12.7)

## 2022-11-07 PROCEDURE — 85025 COMPLETE CBC W/AUTO DIFF WBC: CPT | Performed by: INTERNAL MEDICINE

## 2022-11-07 PROCEDURE — 87040 BLOOD CULTURE FOR BACTERIA: CPT | Performed by: INTERNAL MEDICINE

## 2022-11-07 PROCEDURE — 63600175 PHARM REV CODE 636 W HCPCS: Performed by: INTERNAL MEDICINE

## 2022-11-07 PROCEDURE — 96372 THER/PROPH/DIAG INJ SC/IM: CPT | Mod: 59 | Performed by: HOSPITALIST

## 2022-11-07 PROCEDURE — 63600175 PHARM REV CODE 636 W HCPCS: Performed by: HOSPITALIST

## 2022-11-07 PROCEDURE — 25000003 PHARM REV CODE 250: Performed by: INTERNAL MEDICINE

## 2022-11-07 PROCEDURE — 83615 LACTATE (LD) (LDH) ENZYME: CPT | Performed by: INTERNAL MEDICINE

## 2022-11-07 PROCEDURE — 63600175 PHARM REV CODE 636 W HCPCS: Mod: JG | Performed by: INTERNAL MEDICINE

## 2022-11-07 PROCEDURE — 27000221 HC OXYGEN, UP TO 24 HOURS

## 2022-11-07 PROCEDURE — 94761 N-INVAS EAR/PLS OXIMETRY MLT: CPT

## 2022-11-07 PROCEDURE — G0257 UNSCHED DIALYSIS ESRD PT HOS: HCPCS

## 2022-11-07 PROCEDURE — 84466 ASSAY OF TRANSFERRIN: CPT | Performed by: INTERNAL MEDICINE

## 2022-11-07 PROCEDURE — 82728 ASSAY OF FERRITIN: CPT | Performed by: INTERNAL MEDICINE

## 2022-11-07 PROCEDURE — 83010 ASSAY OF HAPTOGLOBIN QUANT: CPT | Performed by: INTERNAL MEDICINE

## 2022-11-07 PROCEDURE — 96375 TX/PRO/DX INJ NEW DRUG ADDON: CPT | Mod: 59

## 2022-11-07 PROCEDURE — 99900031 HC PATIENT EDUCATION (STAT)

## 2022-11-07 PROCEDURE — 80048 BASIC METABOLIC PNL TOTAL CA: CPT | Performed by: INTERNAL MEDICINE

## 2022-11-07 PROCEDURE — 96372 THER/PROPH/DIAG INJ SC/IM: CPT | Performed by: INTERNAL MEDICINE

## 2022-11-07 PROCEDURE — 99900035 HC TECH TIME PER 15 MIN (STAT)

## 2022-11-07 PROCEDURE — G0378 HOSPITAL OBSERVATION PER HR: HCPCS

## 2022-11-07 PROCEDURE — 90935 HEMODIALYSIS ONE EVALUATION: CPT

## 2022-11-07 RX ORDER — HEPARIN SODIUM 1000 [USP'U]/ML
4000 INJECTION, SOLUTION INTRAVENOUS; SUBCUTANEOUS
Status: DISCONTINUED | OUTPATIENT
Start: 2022-11-07 | End: 2022-11-07 | Stop reason: HOSPADM

## 2022-11-07 RX ADMIN — APIXABAN 5 MG: 5 TABLET, FILM COATED ORAL at 02:11

## 2022-11-07 RX ADMIN — HALOPERIDOL LACTATE 2.5 MG: 5 INJECTION, SOLUTION INTRAMUSCULAR at 03:11

## 2022-11-07 RX ADMIN — EPOETIN ALFA-EPBX 10000 UNITS: 10000 INJECTION, SOLUTION INTRAVENOUS; SUBCUTANEOUS at 10:11

## 2022-11-07 RX ADMIN — LEVETIRACETAM 500 MG: 500 TABLET, FILM COATED ORAL at 02:11

## 2022-11-07 RX ADMIN — CARVEDILOL 25 MG: 25 TABLET, FILM COATED ORAL at 02:11

## 2022-11-07 RX ADMIN — GABAPENTIN 100 MG: 100 CAPSULE ORAL at 02:11

## 2022-11-07 RX ADMIN — HYDRALAZINE HYDROCHLORIDE 100 MG: 25 TABLET ORAL at 05:11

## 2022-11-07 RX ADMIN — FLUOXETINE 20 MG: 20 CAPSULE ORAL at 02:11

## 2022-11-07 RX ADMIN — HEPARIN SODIUM 4000 UNITS: 1000 INJECTION, SOLUTION INTRAVENOUS; SUBCUTANEOUS at 10:11

## 2022-11-07 RX ADMIN — AMLODIPINE BESYLATE 10 MG: 5 TABLET ORAL at 02:11

## 2022-11-07 RX ADMIN — FUROSEMIDE 40 MG: 40 TABLET ORAL at 02:11

## 2022-11-07 RX ADMIN — ISOSORBIDE MONONITRATE 120 MG: 60 TABLET, EXTENDED RELEASE ORAL at 02:11

## 2022-11-07 NOTE — PLAN OF CARE
DC orders and chart reviewed. No discharge needs noted.  Patient cleared for discharge from .   Patient is discharging to home. Patient to follow up for normal dialysis visit in 2 days with Dr. Benitez.         11/07/22 1612   Final Note   Assessment Type Final Discharge Note   Anticipated Discharge Disposition Home   What phone number can be called within the next 1-3 days to see how you are doing after discharge? [phone]   Hospital Resources/Appts/Education Provided Dilaysis schedule provided

## 2022-11-07 NOTE — PROGRESS NOTES
11/07/22 1304   Handoff Report   Received From [facility]   Given To chris   Vital Signs   Temp 99 °F (37.2 °C)   Pulse 91   Resp 16   SpO2 97 %   BP (!) 156/89   Post-Hemodialysis Assessment   Rinseback Volume (mL) 250 mL   Blood Volume Processed (Liters) 50 L   Dialyzer Clearance Lightly streaked   Duration of Treatment 180 minutes   Additional Fluid Intake (mL) 500 mL   Total UF (mL) 3500 mL   Net Fluid Removal 3000   Patient Response to Treatment vss   Post-Treatment Weight 56.9 kg (125 lb 7.1 oz)   Treatment Weight Change -3   Post-Hemodialysis Comments end of tx

## 2022-11-07 NOTE — CONSULTS
INPATIENT NEPHROLOGY CONSULT   [facility] NEPHROLOGY    Tabby Howard  11/07/2022    Reason for consultation:  esrd    Chief Complaint:   Chief Complaint   Patient presents with    Chest Pain     Started yesterday    Vomiting    Shortness of Breath    Back Pain    Cough     History of Present Illness:    Per H and P  32-year-old female who has a history of chronic kidney disease on hemodialysis, diabetes, gastroparesis, GERD, hyperkalemia, hypertension, sickle cell trait, presents emergency room accompanied by for with a history that the patient began with midsternal chest pain and vomiting began today.  She complains also some back pain though that is worsened with movement.  No fever.  No changes in activity or trauma.  Patient states she has had a orange colored sputum.  She does not smoke or drink.  She does admit to being oliguric.  She she admits that weight has been stable.  She has denied any earache sore throat nasal or sinus congestion.  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  Her nephrologist is .  Admits to some wheezing.    11/7 VSS, no new complains. Seen on HD today.    Plan of Care:     ESRD on HD MWF  --continue dialysis per routine  --fluid restrict  --renal dose medication per routine  --continue outpt medication  --continue binders with meals    Anemia due to CKD  --SHELLEY with RRT as needed  --transfused with hd on 11/6  --normal ldh, haptoglobin, iron panel    Hyperphosphatemia and secondary hyperparathyroidism  --renal diet  --continue binders with meals    Hypertension  --uf with hd  --fluid restrict  --low salt diet  --continue home medication    Hyperkalemia  --renal diet  --2 k dialysate    Abdominal pain  --per primary    Pericardial effusion  --uf as able    Thank you for allowing us to participate in this patient's care. We will continue to follow.    Vital Signs:  Temp Readings from Last 3 Encounters:   11/07/22 99.7 °F (37.6 °C) (Oral)   11/01/22 98.6 °F (37 °C) (Oral)    10/29/22 98 °F (36.7 °C) (Skin)       Pulse Readings from Last 3 Encounters:   22 89   22 95   10/29/22 86       BP Readings from Last 3 Encounters:   22 133/78   22 134/79   10/29/22 (!) 162/89       Weight:  Wt Readings from Last 3 Encounters:   22 59.9 kg (132 lb)   10/31/22 55.2 kg (121 lb 11.1 oz)   10/26/22 59 kg (130 lb)       Past Medical & Surgical History:  Past Medical History:   Diagnosis Date    CKD (chronic kidney disease), stage IV 2022    Diabetes mellitus due to underlying condition with unspecified complications 2022    Gastroparesis 2022    Heart failure with preserved ejection fraction 2022    EF 55% on 3/22    History of gastroesophageal reflux (GERD)     History of supraventricular tachycardia     Hyperkalemia 2022    Hypertensive emergency 2022    Sickle cell trait 2022    Type 2 diabetes mellitus        Past Surgical History:   Procedure Laterality Date     SECTION      x 3    COLONOSCOPY      COLONOSCOPY N/A 2022    Procedure: COLONOSCOPY;  Surgeon: Jagdeep Cedeno MD;  Location: [facility];  Service: Endoscopy;  Laterality: N/A;    ESOPHAGOGASTRODUODENOSCOPY N/A 10/18/2019    Procedure: ESOPHAGOGASTRODUODENOSCOPY (EGD);  Surgeon: Gianluca Mendez MD;  Location: [facility];  Service: Endoscopy;  Laterality: N/A;    ESOPHAGOGASTRODUODENOSCOPY N/A 2022    Procedure: EGD (ESOPHAGOGASTRODUODENOSCOPY);  Surgeon: Micky Paredes III, MD;  Location: [facility];  Service: Endoscopy;  Laterality: N/A;    LAPAROSCOPIC CHOLECYSTECTOMY N/A 2022    Procedure: CHOLECYSTECTOMY, LAPAROSCOPIC;  Surgeon: Grey Perez MD;  Location: [facility];  Service: General;  Laterality: N/A;    PLACEMENT OF DUAL-LUMEN VASCULAR CATHETER Left 2022    Procedure: INSERTION-CATHETER-JOSEPH;  Surgeon: Dionte Gan MD;  Location: [facility] OR;  Service: General;  Laterality: Left;    PLACEMENT OF DUAL-LUMEN VASCULAR CATHETER Right  07/26/2022    Procedure: INSERTION-CATHETER-Hemosplit;  Surgeon: Dionte Gan MD;  Location: [facility];  Service: General;  Laterality: Right;       Past Social History:  Social History     Socioeconomic History    Marital status:    Tobacco Use    Smoking status: Never    Smokeless tobacco: Never   Substance and Sexual Activity    Alcohol use: Not Currently    Drug use: No    Sexual activity: Not Currently     Partners: Male     Birth control/protection: I.U.D.     Social Determinants of Health     Financial Resource Strain: Unknown    Difficulty of Paying Living Expenses: Patient refused   Food Insecurity: Unknown    Worried About Running Out of Food in the Last Year: Patient refused    Ran Out of Food in the Last Year: Patient refused   Transportation Needs: Unknown    Lack of Transportation (Medical): Patient refused    Lack of Transportation (Non-Medical): Patient refused   Physical Activity: Unknown    Days of Exercise per Week: Patient refused    Minutes of Exercise per Session: Patient refused   Stress: Unknown    Feeling of Stress : Patient refused   Social Connections: Unknown    Frequency of Communication with Friends and Family: Patient refused    Frequency of Social Gatherings with Friends and Family: Patient refused    Attends Christian Services: Patient refused    Active Member of Clubs or Organizations: Patient refused    Attends Club or Organization Meetings: Patient refused    Marital Status: Patient refused   Housing Stability: Unknown    Unable to Pay for Housing in the Last Year: Patient refused    Unstable Housing in the Last Year: Patient refused       Medications:  No current facility-administered medications on file prior to encounter.     Current Outpatient Medications on File Prior to Encounter   Medication Sig Dispense Refill    albuterol (PROVENTIL/VENTOLIN HFA) 90 mcg/actuation inhaler Inhale 2 puffs into the lungs every 6 (six) hours as needed for Wheezing. Rescue       amLODIPine (NORVASC) 10 MG tablet Take 1 tablet (10 mg total) by mouth once daily. 30 tablet 11    apixaban (ELIQUIS) 5 mg Tab Take 1 tablet (5 mg total) by mouth 2 (two) times daily. For second month on. 60 tablet 2    carvediloL (COREG) 25 MG tablet Take 1 tablet (25 mg total) by mouth 2 (two) times daily. 60 tablet 2    cloNIDine 0.2 mg/24 hr td ptwk (CATAPRES) 0.2 mg/24 hr Place 1 patch onto the skin every 7 days. 4 patch 2    famotidine (PEPCID) 20 MG tablet Take 1 tablet (20 mg total) by mouth once daily. 30 tablet 0    FLUoxetine 20 MG capsule Take 1 capsule (20 mg total) by mouth once daily. 30 capsule 1    furosemide (LASIX) 40 MG tablet Take 1 tablet (40 mg total) by mouth 2 (two) times daily. 60 tablet 0    gabapentin (NEURONTIN) 100 MG capsule Take 1 capsule (100 mg total) by mouth 3 (three) times daily. 90 capsule 2    hydrALAZINE (APRESOLINE) 100 MG tablet Take 1 tablet (100 mg total) by mouth every 8 (eight) hours. 90 tablet 2    HYDROcodone-acetaminophen (NORCO) 5-325 mg per tablet Take 1 tablet by mouth every 6 (six) hours as needed for Pain. 16 tablet 0    insulin aspart U-100 (NOVOLOG) 100 unit/mL (3 mL) InPn pen Inject 1-10 Units into the skin before meals and at bedtime as needed (Hyperglycemia). 3 mL 3    isosorbide mononitrate (IMDUR) 60 MG 24 hr tablet Take 2 tablets (120 mg total) by mouth once daily. 30 tablet 1    LANTUS U-100 INSULIN 100 unit/mL injection Inject 5 Units into the skin once daily.      levETIRAcetam (KEPPRA) 500 MG Tab Take 1 tablet (500 mg total) by mouth 2 (two) times daily. 60 tablet 1    ondansetron (ZOFRAN-ODT) 4 MG TbDL Dissolve 1 tablet (4 mg total) by mouth every 8 (eight) hours as needed (nausea, vomiting). 12 tablet 0    pantoprazole (PROTONIX) 40 MG tablet Take 1 tablet (40 mg total) by mouth once daily. 30 tablet 0    promethazine (PHENERGAN) 25 MG tablet Take 1 tablet (25 mg total) by mouth every 6 (six) hours as needed for Nausea. 10 tablet 0    [DISCONTINUED]  "atenoloL (TENORMIN) 50 MG tablet Take 1 tablet (50 mg total) by mouth every other day. 45 tablet 3    [DISCONTINUED] omeprazole (PRILOSEC) 20 MG capsule Take 2 capsules (40 mg total) by mouth once daily. for 10 days 20 capsule 0    [DISCONTINUED] torsemide (DEMADEX) 20 MG Tab Take 1 tablet (20 mg total) by mouth 2 (two) times a day. (Patient taking differently: Take 20 mg by mouth once daily.) 60 tablet 1     Scheduled Meds:   sodium chloride 0.9%   Intravenous Once    sodium chloride 0.9%   Intravenous Once    amLODIPine  10 mg Oral Daily    apixaban  5 mg Oral BID    carvediloL  25 mg Oral BID    cloNIDine 0.2 mg/24 hr td ptwk  1 patch Transdermal Q7 Days    epoetin teresa-epbx  10,000 Units Subcutaneous Every Mon, Wed, Fri    FLUoxetine  20 mg Oral Daily    furosemide  40 mg Oral BID    gabapentin  100 mg Oral TID    hydrALAZINE  100 mg Oral Q8H    insulin detemir U-100  5 Units Subcutaneous Daily    isosorbide mononitrate  120 mg Oral Daily    levETIRAcetam  500 mg Oral BID    levoFLOXacin  500 mg Intravenous Q48H    mupirocin   Nasal BID     Continuous Infusions:  PRN Meds:.acetaminophen, albuterol sulfate, dextrose 10%, dextrose 10%, glucagon (human recombinant), glucose, glucose, haloperidol lactate, HYDROcodone-acetaminophen, naloxone, sodium chloride 0.9%, sodium chloride 0.9%, sodium chloride 0.9%    Allergies:  Penicillins    Past Family History:  Reviewed; refer to Hospitalist Admission Note    Review of Systems:  Review of Systems - All 14 systems reviewed and negative, except as noted in HPI    Physical Exam:    /78 (BP Location: Left arm, Patient Position: Lying)   Pulse 89   Temp 99.7 °F (37.6 °C) (Oral)   Resp 20   Ht 5' 2" (1.575 m)   Wt 59.9 kg (132 lb)   LMP  (LMP Unknown)   SpO2 (!) 91%   Breastfeeding No   BMI 24.14 kg/m²     General Appearance:    tearful   Head:    Normocephalic, without obvious abnormality, atraumatic   Eyes:    PER, conjunctiva/corneas clear, EOM's intact in " both eyes        Throat:   Lips, mucosa, and tongue normal; teeth and gums normal   Back:     Symmetric, no curvature, ROM normal, no CVA tenderness   Lungs:     Clear to auscultation bilaterally, respirations unlabored   Chest wall:    No tenderness or deformity   Heart:    Regular rate and rhythm, S1 and S2 normal, no murmur, rub   or gallop   Abdomen:     Tender but no rebound. Pos bs.  Some degree of distractibility    Extremities:   Extremities normal, atraumatic, no cyanosis or edema   Pulses:   2+ and symmetric all extremities   MSK:   No joint or muscle swelling, tenderness or deformity   Skin:   Skin color, texture, turgor normal, no rashes or lesions   Neurologic:   CNII-XII intact, normal strength and sensation       Throughout.  No flap     Results:  Recent Labs   Lab 11/01/22 0240 11/06/22 0432 11/07/22 0349   * 135* 136   K 3.6 5.2* 4.9   CL 98 99 107   CO2 23 29 21*   BUN 26* 29* 22*   CREATININE 3.4* 3.7* 3.1*   * 63* 186*       Recent Labs   Lab 11/01/22 0240 11/06/22 0432 11/07/22  0349   CALCIUM 7.8* 8.7 8.6*   ALBUMIN 2.4* 3.1*  --    PHOS 2.3*  --   --    MG 1.6  --   --        Recent Labs   Lab 07/08/22  0516   PTH, Intact 289.8 H       Recent Labs   Lab 10/31/22  1151 10/31/22  1623 10/31/22  1627 10/31/22  1744 10/31/22  2203 11/01/22  0729 11/01/22  1130   POCTGLUCOSE 89 69* 65* 115* 231* 190* 188*       Recent Labs   Lab 05/15/22  1954 07/22/22  1653 08/24/22  0218   Hemoglobin A1C 5.8 H 6.0 H 6.2       Recent Labs   Lab 11/01/22 0240 11/06/22 0432 11/07/22  0349   WBC 6.57 7.39 6.47   HGB 10.4* 7.2* 9.4*   HCT 28.9* 21.5* 29.0*   * 102* 109*   MCV 83 87 88   MCHC 36.0 33.5 32.4   MONO 10.8  0.7 8.1  0.6 8.3  0.5       Recent Labs   Lab 11/01/22  0240 11/06/22  0432   BILITOT 1.0 2.4*   PROT 5.7* 6.6   ALBUMIN 2.4* 3.1*   ALKPHOS 105 185*   ALT 18 136*   AST 14 98*       Recent Labs   Lab 07/07/22  0912 10/16/22  1736 10/24/22  0107   Color, UA Yellow Yellow  Yellow   Appearance, UA Clear Hazy A Clear   pH, UA 6.0 7.0 8.0   Specific [place], UA 1.020 1.020 1.020   Protein, UA 3+ A 4+ 4+   Glucose, UA Negative 4+ A 4+ A   Ketones, UA Negative Trace A Negative   Urobilinogen, UA Negative Negative Negative   Bilirubin (UA) Negative Negative Negative   Occult Blood UA 2+ A 2+ A 2+ A   Nitrite, UA Negative Negative Negative   RBC, UA 3 12 H 41 H   WBC, UA 8 H >100 H 24 H   Bacteria Rare Negative Negative   Hyaline Casts, UA 0 7 A 6 A       Recent Labs   Lab 07/24/22  0445 10/04/22  0901 10/16/22  1643   POC PH 7.637 HH 7.378 7.398   POC PCO2 26.0 LL 54.4 H 37.6   POC HCO3 27.8 32.0 H 23.2 L   POC PO2 123 H 26 L 55   POC SATURATED O2 99 44 L 88 L   POC BE 7 7 -2   Sample ARTERIAL VENOUS VENOUS       Microbiology Results (last 7 days)       Procedure Component Value Units Date/Time    Blood culture [[id number]] Collected: 11/07/22 0349    Order Status: Sent Specimen: Blood Updated: 11/07/22 0440    Narrative:      Collection has been rescheduled by [email or web address] at 11/06/2022 14:18 Reason:   Patient unavailable/dialysis  Collection has been rescheduled by RE1 at 11/06/2022 16:21 Reason:   Unable to collect  Collection has been rescheduled by [email or web address] at 11/06/2022 14:18 Reason:   Patient unavailable/dialysis  Collection has been rescheduled by RE1 at 11/06/2022 16:21 Reason:   Unable to collect    Blood culture [[id number]] Collected: 11/06/22 1903    Order Status: Completed Specimen: Blood Updated: 11/07/22 0158     Blood Culture, Routine No Growth to date    Narrative:      Collection has been rescheduled by [email or web address] at 11/06/2022 14:18 Reason:   Patient unavailable/dialysis  Collection has been rescheduled by RE1 at 11/06/2022 16:21 Reason:   Unable to collect  Collection has been rescheduled by [email or web address] at 11/06/2022 14:18 Reason:   Patient unavailable/dialysis  Collection has been rescheduled by RE1 at 11/06/2022 16:21 Reason:   Unable to collect              Patient care was time spent  personally by me on the following activities:   Obtaining a history  Examination of patient.  Providing medical care at the patients bedside.  Developing a treatment plan with patient or surrogate and bedside caregivers  Ordering and reviewing laboratory studies, radiographic studies, pulse oximetry.  Ordering and performing treatments and interventions.  Evaluation of patient's response to treatment.  Discussions with consultants while on the unit and immediately available to the patient.  Re-evaluation of the patient's condition.  Documentation in the medical record.     Mando Benitez MD  Nephrology  [facility]  [phone]

## 2022-11-07 NOTE — NURSING
Pt back in room from dialysis. AAOX4, bed in lowest and locked position, siderails up x2, bed alarm on. Meds currently being administered for abnormal vitals

## 2022-11-07 NOTE — PLAN OF CARE
11/07/22 1339   Patient Assessment/Suction   Level of Consciousness (AVPU) alert   Respiratory Effort Normal;Unlabored   All Lung Fields Breath Sounds clear   Rhythm/Pattern, Respiratory no shortness of breath reported   PRE-TX-O2   O2 Device (Oxygen Therapy) nasal cannula   $ Is the patient on Low Flow Oxygen? Yes   Flow (L/min) 2   SpO2 96 %   Pulse Oximetry Type Intermittent   $ Pulse Oximetry - Multiple Charge Pulse Oximetry - Multiple   Pulse 93   Resp 16   Aerosol Therapy   $ Aerosol Therapy Charges PRN treatment not required   Respiratory Treatment Status (SVN) PRN treatment not required   Education   $ Education Bronchodilator;15 min   Respiratory Evaluation   $ Care Plan Tech Time 15 min

## 2022-11-07 NOTE — PLAN OF CARE
Problem: Adult Inpatient Plan of Care  Goal: Plan of Care Review  Outcome: Ongoing, Progressing     Problem: Device-Related Complication Risk (Hemodialysis)  Goal: Safe, Effective Therapy Delivery  Outcome: Ongoing, Progressing     Problem: Infection  Goal: Absence of Infection Signs and Symptoms  Outcome: Ongoing, Progressing

## 2022-11-07 NOTE — HOSPITAL COURSE
"11/7/2022  Pt has " pain all over " but cannot be more specific. I only want IV dilaudid for pain. Pt has pain control with norco at present < 4/10 pain at present  Pt has no SOB, HASTINGS orthopnea or PND and can lie recumbent  ROS: see above otherwise negative X 8  PE: in no distress HEENT VIJAYA EOM intact moist mucus membranes Neck supple no use of accessory muscles Lungs no crackles wheezes or rhonchi Heart S 1 S 2 RRR no murmur Abdomen BS+ nontender Ext without cc or E pulses 1-2+ Skin no acute finding Neuro AA O X 3 no acute sensory or motor deficit  "

## 2022-11-07 NOTE — CARE UPDATE
11/06/22 3937   Patient Assessment/Suction   Level of Consciousness (AVPU) alert   Respiratory Effort Unlabored   Expansion/Accessory Muscles/Retractions no use of accessory muscles   All Lung Fields Breath Sounds clear;diminished   Rhythm/Pattern, Respiratory no shortness of breath reported   Cough Frequency no cough   PRE-TX-O2   O2 Device (Oxygen Therapy) nasal cannula   $ Is the patient on Low Flow Oxygen? Yes   Flow (L/min) 2   SpO2 98 %   Pulse Oximetry Type Intermittent   $ Pulse Oximetry - Multiple Charge Pulse Oximetry - Multiple   Pulse 95   Resp 20   Positioning   Head of Bed (HOB) Positioning HOB elevated;HOB at 20-30 degrees   Respiratory Evaluation   $ Care Plan Tech Time 15 min

## 2022-11-07 NOTE — DISCHARGE SUMMARY
"[facility]  Discharge Summary      Patient Name: Tabby Howard  MRN: [id number]  UNIQUE: [id number]  Patient Class: OP- Observation  Admission Date: 11/6/2022  Hospital Length of Stay: 0 days  Discharge Date and Time:  11/07/2022 4:15 PM  Attending Physician: Roby Scott MD   Discharging Provider: Roby Scott MD  Primary Care Provider: Primary Doctor No    Primary Care Team: Networked reference to record PCT     HPI:     31 y/o F w/ hx of ESRD on HD, DM, HFpEF, GERD, suspected gastroparesis, sickle cell trait, HTN, with a recent episode of C diff . Abnormal FANNIE ,   Recently DC form [facility]  with  R eye vision los  likely due to proliferative retinopathy and uncontrolled DM and HTN, was admitted with pulmonary edema and midsternal chest pain and vomiting.  She was visiting to her parents from [place] and having chest pain with vomiting this morning accompanied with some back pain which is chronic but worsened and came to emergency room.  He no fever and no history of trauma and patient report colored sputum and patient was having wheezing and chest x-ray with pulmonary edema and admitted.  Nephrology was consulted and doing urgent dialysis.  Blood work with a hemoglobin 7.2, hyperkalemia 5.2 and BNP is severely elevated and mild elevated troponin.  Patient is having a lot of pain but not in the chart is but in the back.  EKG did not show any ischemia and despite reported a lot of pains but patient heart rate is normal.  As chart review her BNP is elevated at since is last month around 4000 5000 and chronically elevated troponins.  As per previous admission , MD note with "She has very dramatic behavior, likes only IV Dilaudid"             * No surgery found *      Hospital Course:   11/7/2022  Pt has " pain all over " but cannot be more specific. I only want IV dilaudid for pain. Pt has pain control with norco at present < 4/10 pain at present  Pt has no SOB, " HASTINGS orthopnea or PND and can lie recumbent  ROS: see above otherwise negative X 8  PE: in no distress HEENT VIJAYA EOM intact moist mucus membranes Neck supple no use of accessory muscles Lungs no crackles wheezes or rhonchi Heart S 1 S 2 RRR no murmur Abdomen BS+ nontender Ext without cc or E pulses 1-2+ Skin no acute finding Neuro AA O X 3 no acute sensory or motor deficit       Goals of Care Treatment Preferences:  Code Status: Full Code    Health care agent: Step-Mother Tanya Howard / Father Garcia Howard  Health care agent number: [phone] / [phone]          What is most important right now is to focus on remaining as independent as possible.  Accordingly, we have decided that the best plan to meet the patient's goals includes continuing with treatment.      Consults:   Consults (From admission, onward)        Status Ordering Provider     Inpatient consult to Nephrology  Once        Provider:  Hugh Figueroa MD    Completed DEE HARPER JR     Inpatient consult to Hospitalist  Once        Provider:  Armani Gurrola MD    Acknowledged DEE HARPER JR          No new Assessment & Plan notes have been filed under this hospital service since the last note was generated.  Service: Hospital Medicine    Final Active Diagnoses:    Diagnosis Date Noted POA    PRINCIPAL PROBLEM:  Pulmonary edema [J81.1] 07/22/2022 Yes    Anemia, acute on chronic [D64.9] 10/26/2022 Yes     Chronic    Thrombocytopenia [D69.6] 10/26/2022 Yes     Chronic    Volume overload with pulmonary edema [E87.70] 10/04/2022 Yes    Primary hypertension [I10] 07/30/2022 Yes     Chronic    Deep vein thrombosis (DVT) of non-extremity vein [I82.90] 07/26/2022 Yes     Chronic    Acute hypoxemic respiratory failure [J96.01] 07/22/2022 Yes    ESRD (end stage renal disease) [N18.6] 07/22/2022 Yes    Seizure [R56.9] 05/29/2022 Yes    Diabetes mellitus due to underlying condition with unspecified complications [E08.8] 04/12/2022 Yes     Pericardial effusion [I31.39] 03/07/2022 Yes    Type 2 diabetes mellitus with chronic kidney disease on chronic dialysis, with long-term current use of insulin [E11.22, N18.6, Z99.2, Z79.4] 10/16/2019 Not Applicable     Chronic    Gallstones [K80.20] 10/16/2019 Yes    Gastritis [K29.70] 10/15/2019 Yes      Problems Resolved During this Admission:       Discharged Condition: good    Disposition: Home or Self Care    Follow Up:   Follow-up Information     Primary Doctor No Follow up.           Mando Benitez MD Follow up in 2 day(s).    Specialty: Nephrology  Why: Follow up with Dr. Benitez with normal dialysis visit in 2 days.  Contact information:  [address] [place] [address] NEPHROLOGY [facility] [address] [phone]                       Patient Instructions:      Diet renal     Diet diabetic     Activity as tolerated       Significant Diagnostic Studies: Labs:   BMP:   Recent Labs   Lab 11/06/22 0432 11/07/22 0349   GLU 63* 186*   * 136   K 5.2* 4.9   CL 99 107   CO2 29 21*   BUN 29* 22*   CREATININE 3.7* 3.1*   CALCIUM 8.7 8.6*   , CMP   Recent Labs   Lab 11/06/22 0432 11/07/22 0349   * 136   K 5.2* 4.9   CL 99 107   CO2 29 21*   GLU 63* 186*   BUN 29* 22*   CREATININE 3.7* 3.1*   CALCIUM 8.7 8.6*   PROT 6.6  --    ALBUMIN 3.1*  --    BILITOT 2.4*  --    ALKPHOS 185*  --    AST 98*  --    *  --    ANIONGAP 7* 8   , CBC   Recent Labs   Lab 11/06/22 0432 11/07/22 0349   WBC 7.39 6.47   HGB 7.2* 9.4*   HCT 21.5* 29.0*   * 109*   , INR   Lab Results   Component Value Date    INR 1.0 10/26/2022    INR 1.1 08/23/2022    INR 1.0 07/28/2022   , Troponin   Recent Labs   Lab 11/06/22 0432 11/06/22  0756   TROPONINI 0.046* 0.049*    and All labs within the past 24 hours have been reviewed  Microbiology:   Blood Culture   Lab Results   Component Value Date    LABBLOO No Growth to date 11/07/2022    and Urine Culture    Lab Results   Component Value Date    LABURIN No  growth 10/16/2022       Pending Diagnostic Studies:     Procedure Component Value Units Date/Time    Haptoglobin [[id number]] Collected: 11/07/22 0349    Order Status: Sent Lab Status: In process Updated: 11/07/22 0440    Specimen: Blood          Medications:  Reconciled Home Medications:      Medication List      CONTINUE taking these medications    albuterol 90 mcg/actuation inhaler  Commonly known as: PROVENTIL/VENTOLIN HFA  Inhale 2 puffs into the lungs every 6 (six) hours as needed for Wheezing. Rescue     amLODIPine 10 MG tablet  Commonly known as: NORVASC  Take 1 tablet (10 mg total) by mouth once daily.     apixaban 5 mg Tab  Commonly known as: ELIQUIS  Take 1 tablet (5 mg total) by mouth 2 (two) times daily. For second month on.     carvediloL 25 MG tablet  Commonly known as: COREG  Take 1 tablet (25 mg total) by mouth 2 (two) times daily.     cloNIDine 0.2 mg/24 hr td ptwk 0.2 mg/24 hr  Commonly known as: CATAPRES  Place 1 patch onto the skin every 7 days.     FLUoxetine 20 MG capsule  Take 1 capsule (20 mg total) by mouth once daily.     furosemide 40 MG tablet  Commonly known as: LASIX  Take 1 tablet (40 mg total) by mouth 2 (two) times daily.     gabapentin 100 MG capsule  Commonly known as: NEURONTIN  Take 1 capsule (100 mg total) by mouth 3 (three) times daily.     hydrALAZINE 100 MG tablet  Commonly known as: APRESOLINE  Take 1 tablet (100 mg total) by mouth every 8 (eight) hours.     HYDROcodone-acetaminophen 5-325 mg per tablet  Commonly known as: NORCO  Take 1 tablet by mouth every 6 (six) hours as needed for Pain.     insulin aspart U-100 100 unit/mL (3 mL) Inpn pen  Commonly known as: NovoLOG  Inject 1-10 Units into the skin before meals and at bedtime as needed (Hyperglycemia).     isosorbide mononitrate 60 MG 24 hr tablet  Commonly known as: IMDUR  Take 2 tablets (120 mg total) by mouth once daily.     LANTUS U-100 INSULIN 100 unit/mL injection  Generic drug: insulin glargine  Inject 5 Units  into the skin once daily.     levETIRAcetam 500 MG Tab  Commonly known as: KEPPRA  Take 1 tablet (500 mg total) by mouth 2 (two) times daily.     ondansetron 4 MG Tbdl  Commonly known as: ZOFRAN-ODT  Dissolve 1 tablet (4 mg total) by mouth every 8 (eight) hours as needed (nausea, vomiting).     promethazine 25 MG tablet  Commonly known as: PHENERGAN  Take 1 tablet (25 mg total) by mouth every 6 (six) hours as needed for Nausea.        STOP taking these medications    famotidine 20 MG tablet  Commonly known as: PEPCID     pantoprazole 40 MG tablet  Commonly known as: PROTONIX            Indwelling Lines/Drains at time of discharge:   Lines/Drains/Airways     Central Venous Catheter Line  Duration                Hemodialysis Catheter 07/26/22 1457 right internal jugular 104 days                Time spent on the discharge of patient: 27 minutes         Roby Scott MD  Department of Hospital Medicine  [facility]

## 2022-11-08 LAB — HAPTOGLOB SERPL-MCNC: 39 MG/DL (ref 33–278)

## 2022-11-08 NOTE — NURSING
Pt IV removed, Telemetry monitor removed, Discharge AVS was reviewed and given to pt.  Pt taken downstairs to and picked up by family.

## 2022-11-11 ENCOUNTER — HOSPITAL ENCOUNTER (INPATIENT)
Facility: HOSPITAL | Age: 32
LOS: 4 days | Discharge: HOME | DRG: 640 | End: 2022-11-15
Attending: EMERGENCY MEDICINE | Admitting: STUDENT IN AN ORGANIZED HEALTH CARE EDUCATION/TRAINING PROGRAM
Payer: MEDICAID

## 2022-11-11 DIAGNOSIS — E87.70 VOLUME OVERLOAD: ICD-10-CM

## 2022-11-11 DIAGNOSIS — Z99.2 ESRD (END STAGE RENAL DISEASE) ON DIALYSIS: ICD-10-CM

## 2022-11-11 DIAGNOSIS — T50.905A MEDICATION ADVERSE EFFECT: ICD-10-CM

## 2022-11-11 DIAGNOSIS — R10.9 INTRACTABLE ABDOMINAL PAIN: Primary | ICD-10-CM

## 2022-11-11 DIAGNOSIS — K31.84 GASTROPARESIS: ICD-10-CM

## 2022-11-11 DIAGNOSIS — N18.6 ESRD (END STAGE RENAL DISEASE) ON DIALYSIS: ICD-10-CM

## 2022-11-11 DIAGNOSIS — E87.70 HYPERVOLEMIA, UNSPECIFIED HYPERVOLEMIA TYPE: ICD-10-CM

## 2022-11-11 DIAGNOSIS — R07.9 CHEST PAIN: ICD-10-CM

## 2022-11-11 PROBLEM — R10.13 EPIGASTRIC PAIN: Status: ACTIVE | Noted: 2022-11-11

## 2022-11-11 PROBLEM — R11.2 NAUSEA AND VOMITING: Status: ACTIVE | Noted: 2022-11-11

## 2022-11-11 PROBLEM — D63.8 ANEMIA, CHRONIC DISEASE: Status: ACTIVE | Noted: 2022-10-26

## 2022-11-11 LAB
ALBUMIN SERPL BCP-MCNC: 3 G/DL (ref 3.5–5.2)
ALP SERPL-CCNC: 263 U/L (ref 55–135)
ALT SERPL W/O P-5'-P-CCNC: 58 U/L (ref 10–44)
ANION GAP SERPL CALC-SCNC: 13 MMOL/L (ref 8–16)
AST SERPL-CCNC: 33 U/L (ref 10–40)
BACTERIA BLD CULT: NORMAL
BASOPHILS # BLD AUTO: 0.04 K/UL (ref 0–0.2)
BASOPHILS NFR BLD: 0.6 % (ref 0–1.9)
BILIRUB SERPL-MCNC: 1.5 MG/DL (ref 0.1–1)
BNP SERPL-MCNC: 3578 PG/ML (ref 0–99)
BUN SERPL-MCNC: 28 MG/DL (ref 6–20)
CALCIUM SERPL-MCNC: 9.6 MG/DL (ref 8.7–10.5)
CHLORIDE SERPL-SCNC: 98 MMOL/L (ref 95–110)
CO2 SERPL-SCNC: 23 MMOL/L (ref 23–29)
CREAT SERPL-MCNC: 4 MG/DL (ref 0.5–1.4)
DIFFERENTIAL METHOD: ABNORMAL
EOSINOPHIL # BLD AUTO: 0.1 K/UL (ref 0–0.5)
EOSINOPHIL NFR BLD: 1.1 % (ref 0–8)
ERYTHROCYTE [DISTWIDTH] IN BLOOD BY AUTOMATED COUNT: 15.8 % (ref 11.5–14.5)
EST. GFR  (NO RACE VARIABLE): 14.5 ML/MIN/1.73 M^2
GLUCOSE SERPL-MCNC: 65 MG/DL (ref 70–110)
HCT VFR BLD AUTO: 32.7 % (ref 37–48.5)
HGB BLD-MCNC: 11.3 G/DL (ref 12–16)
IMM GRANULOCYTES # BLD AUTO: 0.04 K/UL (ref 0–0.04)
IMM GRANULOCYTES NFR BLD AUTO: 0.6 % (ref 0–0.5)
LACTATE SERPL-SCNC: 0.9 MMOL/L (ref 0.5–2.2)
LIPASE SERPL-CCNC: 17 U/L (ref 4–60)
LYMPHOCYTES # BLD AUTO: 0.9 K/UL (ref 1–4.8)
LYMPHOCYTES NFR BLD: 13.6 % (ref 18–48)
MCH RBC QN AUTO: 28.5 PG (ref 27–31)
MCHC RBC AUTO-ENTMCNC: 34.6 G/DL (ref 32–36)
MCV RBC AUTO: 82 FL (ref 82–98)
MONOCYTES # BLD AUTO: 0.7 K/UL (ref 0.3–1)
MONOCYTES NFR BLD: 10.6 % (ref 4–15)
NEUTROPHILS # BLD AUTO: 4.7 K/UL (ref 1.8–7.7)
NEUTROPHILS NFR BLD: 73.5 % (ref 38–73)
NRBC BLD-RTO: 0 /100 WBC
PLATELET # BLD AUTO: 148 K/UL (ref 150–450)
PMV BLD AUTO: 9.4 FL (ref 9.2–12.9)
POCT GLUCOSE: 129 MG/DL (ref 70–110)
POCT GLUCOSE: 82 MG/DL (ref 70–110)
POTASSIUM SERPL-SCNC: 4.1 MMOL/L (ref 3.5–5.1)
PROT SERPL-MCNC: 6.7 G/DL (ref 6–8.4)
RBC # BLD AUTO: 3.97 M/UL (ref 4–5.4)
SODIUM SERPL-SCNC: 134 MMOL/L (ref 136–145)
TROPONIN I SERPL DL<=0.01 NG/ML-MCNC: 0.04 NG/ML (ref 0–0.03)
WBC # BLD AUTO: 6.41 K/UL (ref 3.9–12.7)

## 2022-11-11 PROCEDURE — 83605 ASSAY OF LACTIC ACID: CPT | Performed by: EMERGENCY MEDICINE

## 2022-11-11 PROCEDURE — 83880 ASSAY OF NATRIURETIC PEPTIDE: CPT | Performed by: EMERGENCY MEDICINE

## 2022-11-11 PROCEDURE — 63600175 PHARM REV CODE 636 W HCPCS: Performed by: EMERGENCY MEDICINE

## 2022-11-11 PROCEDURE — G0378 HOSPITAL OBSERVATION PER HR: HCPCS

## 2022-11-11 PROCEDURE — 96375 TX/PRO/DX INJ NEW DRUG ADDON: CPT

## 2022-11-11 PROCEDURE — 96376 TX/PRO/DX INJ SAME DRUG ADON: CPT

## 2022-11-11 PROCEDURE — 80053 COMPREHEN METABOLIC PANEL: CPT | Performed by: EMERGENCY MEDICINE

## 2022-11-11 PROCEDURE — 96374 THER/PROPH/DIAG INJ IV PUSH: CPT

## 2022-11-11 PROCEDURE — 84484 ASSAY OF TROPONIN QUANT: CPT | Performed by: EMERGENCY MEDICINE

## 2022-11-11 PROCEDURE — 36415 COLL VENOUS BLD VENIPUNCTURE: CPT | Performed by: STUDENT IN AN ORGANIZED HEALTH CARE EDUCATION/TRAINING PROGRAM

## 2022-11-11 PROCEDURE — 63600175 PHARM REV CODE 636 W HCPCS: Performed by: STUDENT IN AN ORGANIZED HEALTH CARE EDUCATION/TRAINING PROGRAM

## 2022-11-11 PROCEDURE — 99291 PR CRITICAL CARE, E/M 30-74 MINUTES: ICD-10-PCS | Mod: ,,, | Performed by: EMERGENCY MEDICINE

## 2022-11-11 PROCEDURE — 12000002 HC ACUTE/MED SURGE SEMI-PRIVATE ROOM

## 2022-11-11 PROCEDURE — 83690 ASSAY OF LIPASE: CPT | Performed by: STUDENT IN AN ORGANIZED HEALTH CARE EDUCATION/TRAINING PROGRAM

## 2022-11-11 PROCEDURE — 99291 CRITICAL CARE FIRST HOUR: CPT | Mod: ,,, | Performed by: EMERGENCY MEDICINE

## 2022-11-11 PROCEDURE — 25000003 PHARM REV CODE 250: Performed by: HOSPITALIST

## 2022-11-11 PROCEDURE — 99285 EMERGENCY DEPT VISIT HI MDM: CPT | Mod: 25

## 2022-11-11 PROCEDURE — 85025 COMPLETE CBC W/AUTO DIFF WBC: CPT | Performed by: EMERGENCY MEDICINE

## 2022-11-11 PROCEDURE — 25000003 PHARM REV CODE 250: Performed by: STUDENT IN AN ORGANIZED HEALTH CARE EDUCATION/TRAINING PROGRAM

## 2022-11-11 RX ORDER — FUROSEMIDE 40 MG/1
40 TABLET ORAL 2 TIMES DAILY
Status: DISCONTINUED | OUTPATIENT
Start: 2022-11-11 | End: 2022-11-15 | Stop reason: HOSPADM

## 2022-11-11 RX ORDER — AMLODIPINE BESYLATE 10 MG/1
10 TABLET ORAL DAILY
Status: DISCONTINUED | OUTPATIENT
Start: 2022-11-12 | End: 2022-11-15 | Stop reason: HOSPADM

## 2022-11-11 RX ORDER — ERYTHROMYCIN 500 MG/1
500 TABLET, COATED ORAL
Status: DISCONTINUED | OUTPATIENT
Start: 2022-11-12 | End: 2022-11-12

## 2022-11-11 RX ORDER — DICLOFENAC SODIUM 10 MG/G
2 GEL TOPICAL DAILY
Status: DISCONTINUED | OUTPATIENT
Start: 2022-11-12 | End: 2022-11-15 | Stop reason: HOSPADM

## 2022-11-11 RX ORDER — GABAPENTIN 100 MG/1
100 CAPSULE ORAL 3 TIMES DAILY
Status: DISCONTINUED | OUTPATIENT
Start: 2022-11-11 | End: 2022-11-15 | Stop reason: HOSPADM

## 2022-11-11 RX ORDER — ALBUTEROL SULFATE 90 UG/1
2 AEROSOL, METERED RESPIRATORY (INHALATION) EVERY 6 HOURS PRN
Status: DISCONTINUED | OUTPATIENT
Start: 2022-11-11 | End: 2022-11-15 | Stop reason: HOSPADM

## 2022-11-11 RX ORDER — HYDROMORPHONE HYDROCHLORIDE 1 MG/ML
1 INJECTION, SOLUTION INTRAMUSCULAR; INTRAVENOUS; SUBCUTANEOUS
Status: COMPLETED | OUTPATIENT
Start: 2022-11-11 | End: 2022-11-11

## 2022-11-11 RX ORDER — LABETALOL HCL 20 MG/4 ML
20 SYRINGE (ML) INTRAVENOUS EVERY 4 HOURS PRN
Status: DISCONTINUED | OUTPATIENT
Start: 2022-11-11 | End: 2022-11-15 | Stop reason: HOSPADM

## 2022-11-11 RX ORDER — BUPRENORPHINE 2 MG/1
2 TABLET SUBLINGUAL ONCE
Status: COMPLETED | OUTPATIENT
Start: 2022-11-11 | End: 2022-11-11

## 2022-11-11 RX ORDER — SODIUM CHLORIDE 0.9 % (FLUSH) 0.9 %
10 SYRINGE (ML) INJECTION
Status: DISCONTINUED | OUTPATIENT
Start: 2022-11-11 | End: 2022-11-15 | Stop reason: HOSPADM

## 2022-11-11 RX ORDER — ACETAMINOPHEN 325 MG/1
650 TABLET ORAL EVERY 4 HOURS PRN
Status: DISCONTINUED | OUTPATIENT
Start: 2022-11-11 | End: 2022-11-11

## 2022-11-11 RX ORDER — CARVEDILOL 25 MG/1
25 TABLET ORAL 2 TIMES DAILY
Status: DISCONTINUED | OUTPATIENT
Start: 2022-11-11 | End: 2022-11-15 | Stop reason: HOSPADM

## 2022-11-11 RX ORDER — GLUCAGON 1 MG
1 KIT INJECTION
Status: DISCONTINUED | OUTPATIENT
Start: 2022-11-11 | End: 2022-11-15 | Stop reason: HOSPADM

## 2022-11-11 RX ORDER — CLONIDINE 0.2 MG/24H
1 PATCH, EXTENDED RELEASE TRANSDERMAL
Status: DISCONTINUED | OUTPATIENT
Start: 2022-11-12 | End: 2022-11-11

## 2022-11-11 RX ORDER — PROCHLORPERAZINE EDISYLATE 5 MG/ML
5 INJECTION INTRAMUSCULAR; INTRAVENOUS EVERY 6 HOURS PRN
Status: DISCONTINUED | OUTPATIENT
Start: 2022-11-11 | End: 2022-11-15 | Stop reason: HOSPADM

## 2022-11-11 RX ORDER — HYDRALAZINE HYDROCHLORIDE 25 MG/1
100 TABLET, FILM COATED ORAL EVERY 8 HOURS
Status: DISCONTINUED | OUTPATIENT
Start: 2022-11-11 | End: 2022-11-15 | Stop reason: HOSPADM

## 2022-11-11 RX ORDER — TALC
6 POWDER (GRAM) TOPICAL NIGHTLY PRN
Status: DISCONTINUED | OUTPATIENT
Start: 2022-11-11 | End: 2022-11-15 | Stop reason: HOSPADM

## 2022-11-11 RX ORDER — ACETAMINOPHEN 500 MG
1000 TABLET ORAL EVERY 8 HOURS PRN
Status: DISCONTINUED | OUTPATIENT
Start: 2022-11-11 | End: 2022-11-13

## 2022-11-11 RX ORDER — ONDANSETRON 2 MG/ML
4 INJECTION INTRAMUSCULAR; INTRAVENOUS EVERY 8 HOURS PRN
Status: DISCONTINUED | OUTPATIENT
Start: 2022-11-11 | End: 2022-11-15 | Stop reason: HOSPADM

## 2022-11-11 RX ORDER — IBUPROFEN 200 MG
24 TABLET ORAL
Status: DISCONTINUED | OUTPATIENT
Start: 2022-11-11 | End: 2022-11-15 | Stop reason: HOSPADM

## 2022-11-11 RX ORDER — LEVETIRACETAM 500 MG/1
500 TABLET ORAL 2 TIMES DAILY
Status: DISCONTINUED | OUTPATIENT
Start: 2022-11-11 | End: 2022-11-15 | Stop reason: HOSPADM

## 2022-11-11 RX ORDER — KETOROLAC TROMETHAMINE 30 MG/ML
15 INJECTION, SOLUTION INTRAMUSCULAR; INTRAVENOUS EVERY 6 HOURS PRN
Status: DISPENSED | OUTPATIENT
Start: 2022-11-11 | End: 2022-11-12

## 2022-11-11 RX ORDER — KETOROLAC TROMETHAMINE 30 MG/ML
15 INJECTION, SOLUTION INTRAMUSCULAR; INTRAVENOUS EVERY 6 HOURS PRN
Status: DISCONTINUED | OUTPATIENT
Start: 2022-11-11 | End: 2022-11-11

## 2022-11-11 RX ORDER — ISOSORBIDE MONONITRATE 60 MG/1
120 TABLET, EXTENDED RELEASE ORAL DAILY
Status: DISCONTINUED | OUTPATIENT
Start: 2022-11-12 | End: 2022-11-15 | Stop reason: HOSPADM

## 2022-11-11 RX ORDER — LIDOCAINE 50 MG/G
1 PATCH TOPICAL
Status: DISCONTINUED | OUTPATIENT
Start: 2022-11-11 | End: 2022-11-13

## 2022-11-11 RX ORDER — FLUOXETINE HYDROCHLORIDE 20 MG/1
20 CAPSULE ORAL DAILY
Status: DISCONTINUED | OUTPATIENT
Start: 2022-11-12 | End: 2022-11-15 | Stop reason: HOSPADM

## 2022-11-11 RX ORDER — METOCLOPRAMIDE HYDROCHLORIDE 5 MG/ML
5 INJECTION INTRAMUSCULAR; INTRAVENOUS EVERY 8 HOURS
Status: DISCONTINUED | OUTPATIENT
Start: 2022-11-11 | End: 2022-11-11

## 2022-11-11 RX ORDER — DROPERIDOL 2.5 MG/ML
1.25 INJECTION, SOLUTION INTRAMUSCULAR; INTRAVENOUS
Status: COMPLETED | OUTPATIENT
Start: 2022-11-11 | End: 2022-11-11

## 2022-11-11 RX ORDER — HYDRALAZINE HYDROCHLORIDE 20 MG/ML
10 INJECTION INTRAMUSCULAR; INTRAVENOUS
Status: COMPLETED | OUTPATIENT
Start: 2022-11-11 | End: 2022-11-11

## 2022-11-11 RX ORDER — METOCLOPRAMIDE HYDROCHLORIDE 5 MG/ML
10 INJECTION INTRAMUSCULAR; INTRAVENOUS EVERY 6 HOURS
Status: DISCONTINUED | OUTPATIENT
Start: 2022-11-11 | End: 2022-11-11

## 2022-11-11 RX ORDER — INSULIN ASPART 100 [IU]/ML
0-5 INJECTION, SOLUTION INTRAVENOUS; SUBCUTANEOUS
Status: DISCONTINUED | OUTPATIENT
Start: 2022-11-11 | End: 2022-11-15 | Stop reason: HOSPADM

## 2022-11-11 RX ORDER — IBUPROFEN 200 MG
16 TABLET ORAL
Status: DISCONTINUED | OUTPATIENT
Start: 2022-11-11 | End: 2022-11-15 | Stop reason: HOSPADM

## 2022-11-11 RX ADMIN — KETOROLAC TROMETHAMINE 15 MG: 30 INJECTION, SOLUTION INTRAMUSCULAR; INTRAVENOUS at 09:11

## 2022-11-11 RX ADMIN — ACETAMINOPHEN 1000 MG: 500 TABLET ORAL at 07:11

## 2022-11-11 RX ADMIN — BUPRENORPHINE 2 MG: 2 TABLET SUBLINGUAL at 11:11

## 2022-11-11 RX ADMIN — Medication 20 MG: at 07:11

## 2022-11-11 RX ADMIN — GABAPENTIN 100 MG: 300 CAPSULE ORAL at 09:11

## 2022-11-11 RX ADMIN — CARVEDILOL 25 MG: 25 TABLET, FILM COATED ORAL at 09:11

## 2022-11-11 RX ADMIN — HYDROMORPHONE HYDROCHLORIDE 1 MG: 1 INJECTION, SOLUTION INTRAMUSCULAR; INTRAVENOUS; SUBCUTANEOUS at 04:11

## 2022-11-11 RX ADMIN — DROPERIDOL 1.25 MG: 2.5 INJECTION, SOLUTION INTRAMUSCULAR; INTRAVENOUS at 02:11

## 2022-11-11 RX ADMIN — HYDRALAZINE HYDROCHLORIDE 10 MG: 20 INJECTION, SOLUTION INTRAMUSCULAR; INTRAVENOUS at 02:11

## 2022-11-11 RX ADMIN — APIXABAN 5 MG: 5 TABLET, FILM COATED ORAL at 09:11

## 2022-11-11 RX ADMIN — LEVETIRACETAM 500 MG: 500 TABLET, FILM COATED ORAL at 09:11

## 2022-11-11 RX ADMIN — LIDOCAINE 1 PATCH: 50 PATCH CUTANEOUS at 07:11

## 2022-11-11 RX ADMIN — HYDRALAZINE HYDROCHLORIDE 10 MG: 20 INJECTION, SOLUTION INTRAMUSCULAR; INTRAVENOUS at 03:11

## 2022-11-11 RX ADMIN — ONDANSETRON 4 MG: 2 INJECTION INTRAMUSCULAR; INTRAVENOUS at 11:11

## 2022-11-11 RX ADMIN — ONDANSETRON 4 MG: 2 INJECTION INTRAMUSCULAR; INTRAVENOUS at 06:11

## 2022-11-11 RX ADMIN — FUROSEMIDE 40 MG: 40 TABLET ORAL at 09:11

## 2022-11-11 RX ADMIN — HYDRALAZINE HYDROCHLORIDE 100 MG: 25 TABLET, FILM COATED ORAL at 09:11

## 2022-11-11 RX ADMIN — HYDROMORPHONE HYDROCHLORIDE 1 MG: 1 INJECTION, SOLUTION INTRAMUSCULAR; INTRAVENOUS; SUBCUTANEOUS at 02:11

## 2022-11-11 NOTE — ED TRIAGE NOTES
Pt presents to the ED with chief complaint of generalized abd pain with nausea. Pt also reports generalized body aches and should have dialysis today. Last dialysis session was Wednesday. Pt tearful due to pain.

## 2022-11-11 NOTE — HPI
Tabby Howard is a 33 y/o F w/ hx of ESRD on HD, DM, HFpEF, GERD, suspected gastroparesis, sickle cell trait, HTN, with a recent episode of C diff who presents to the emergency department with complaints of generalized abdominal pain with nausea and vomiting, diffuse back pain, and mid-sternal chest pain that began at 6 in the morning.  Patient is poor historian; suspect due to grogginess following administration of IV Dilaudid in ED. she is unable to clearly engage in conversation regarding symptomology following recent discharge from hospital (admitted from 10/29-11/01).  Upon review of medical records, patient with multiple admissions for similar symptoms.  When asked specifically, she notes that emesis nonbloody, nonbilious; notes 3 episodes at home prior to presentation.  Describes abdominal pain as generalized ache.  In terms of her chest pain, she notes tightness which is epigastric.  She also endorses headache, lightheadedness, dizziness.  Her visual changes are (left eye haziness) stable.  Her last dialysis session was on Wednesday; she is unable to comment if she completed full session or if any volume removed.  She denies recent THC use.  She notes adherence to her medication regimen.

## 2022-11-11 NOTE — ED NOTES
Patient identifiers verified and correct for Ms. Howard  C/C: generalized abd pain with nausea and body aches  APPEARANCE: awake and alert in NAD.  SKIN: warm, dry and intact. No breakdown or bruising.  MUSCULOSKELETAL: Patient moving all extremities spontaneously, no obvious swelling or deformities noted. Ambulates independently.  RESPIRATORY: Denies shortness of breath.Respirations unlabored.   CARDIAC: Denies CP, 2+ distal pulses; no peripheral edema  ABDOMEN: abd pain with nausea.   : voids spontaneously, denies difficulty  Neurologic: AAO x 4; follows commands equal strength in all extremities; denies numbness/tingling. Denies dizziness

## 2022-11-11 NOTE — ED PROVIDER NOTES
Encounter Date: 2022       History     Chief Complaint   Patient presents with    Generalized Body Aches     Pt reporting generalized body pain. Last dialysis was Wednesday.      The patient is a 32-year-old with the below past medical history who presents with family. She complains of generalized abdominal pain, diffuse back pain, and midsternal chest pain that began at 06:00 today. The symptoms have been constant since onset. There are no exacerbating or alleviating factors. She has not taken medications to attempt treatment. She has associated nausea and vomiting. She has ESRD and receives MWF HD. Her last treatment was 2 days ago. She could not got today due to her symptoms. She has multiple pervious ED visits for similar complaints.     The history is provided by the patient and medical records. No  was used.   Review of patient's allergies indicates:   Allergen Reactions    Penicillins Hives     Past Medical History:   Diagnosis Date    CKD (chronic kidney disease), stage IV 2022    Diabetes mellitus due to underlying condition with unspecified complications 2022    Gastroparesis 2022    Heart failure with preserved ejection fraction 2022    EF 55% on 3/22    History of gastroesophageal reflux (GERD)     History of supraventricular tachycardia     Hyperkalemia 2022    Hypertensive emergency 2022    Sickle cell trait 2022    Type 2 diabetes mellitus      Past Surgical History:   Procedure Laterality Date     SECTION      x 3    COLONOSCOPY      COLONOSCOPY N/A 2022    Procedure: COLONOSCOPY;  Surgeon: Jagdeep Cedeno MD;  Location: [facility];  Service: Endoscopy;  Laterality: N/A;    ESOPHAGOGASTRODUODENOSCOPY N/A 10/18/2019    Procedure: ESOPHAGOGASTRODUODENOSCOPY (EGD);  Surgeon: Gianluca Mendez MD;  Location: [facility];  Service: Endoscopy;  Laterality: N/A;    ESOPHAGOGASTRODUODENOSCOPY N/A 2022    Procedure: EGD  (ESOPHAGOGASTRODUODENOSCOPY);  Surgeon: Micky Paredes III, MD;  Location: [facility];  Service: Endoscopy;  Laterality: N/A;    LAPAROSCOPIC CHOLECYSTECTOMY N/A 07/30/2022    Procedure: CHOLECYSTECTOMY, LAPAROSCOPIC;  Surgeon: Grey Perez MD;  Location: [facility];  Service: General;  Laterality: N/A;    PLACEMENT OF DUAL-LUMEN VASCULAR CATHETER Left 07/12/2022    Procedure: INSERTION-CATHETER-JOSEPH;  Surgeon: Dionte Gan MD;  Location: [facility];  Service: General;  Laterality: Left;    PLACEMENT OF DUAL-LUMEN VASCULAR CATHETER Right 07/26/2022    Procedure: INSERTION-CATHETER-Hemosplit;  Surgeon: Dionte Gan MD;  Location: [facility] OR;  Service: General;  Laterality: Right;     Family History   Problem Relation Age of Onset    Diabetes Mother     Diabetes Father      Social History     Tobacco Use    Smoking status: Never    Smokeless tobacco: Never   Substance Use Topics    Alcohol use: Not Currently    Drug use: No     Review of Systems   Constitutional:  Negative for chills and fever.   HENT:  Negative for trouble swallowing.    Eyes:  Negative for visual disturbance.   Respiratory:  Positive for shortness of breath. Negative for cough.    Cardiovascular:  Positive for chest pain. Negative for leg swelling.   Gastrointestinal:  Positive for abdominal pain, nausea and vomiting.   Musculoskeletal:  Positive for back pain.   Skin:  Negative for rash.   Neurological:  Positive for dizziness and headaches.     Physical Exam     Initial Vitals [11/11/22 1237]   BP Pulse Resp Temp SpO2   (!) 206/117 106 (!) 24 98 °F (36.7 °C) 100 %      MAP       --         Physical Exam    Nursing note and vitals reviewed.  Constitutional: She is not diaphoretic. She appears distressed.   HENT:   Head: Normocephalic and atraumatic.   Mouth/Throat: Mucous membranes are dry.   Eyes: Conjunctivae are normal. No scleral icterus.   Neck: No JVD present.   Cardiovascular:  Normal rate, regular rhythm and normal heart sounds.      Exam reveals no gallop and no friction rub.       No murmur heard.  Pulmonary/Chest: No stridor. No respiratory distress. She has no wheezes. She has no rhonchi.   Abdominal: Abdomen is protuberant. There is generalized abdominal tenderness.   No right CVA tenderness.  No left CVA tenderness. There is no rebound and no guarding.   Musculoskeletal:      Thoracic back: No swelling, deformity, spasms or tenderness.      Lumbar back: No swelling, deformity, spasms or tenderness.     Neurological: She is alert and oriented to person, place, and time. GCS eye subscore is 4. GCS verbal subscore is 5. GCS motor subscore is 6.       ED Course   Procedures  Labs Reviewed   CBC W/ AUTO DIFFERENTIAL - Abnormal; Notable for the following components:       Result Value    RBC 3.97 (*)     Hemoglobin 11.3 (*)     Hematocrit 32.7 (*)     RDW 15.8 (*)     Platelets 148 (*)     Immature Granulocytes 0.6 (*)     Lymph # 0.9 (*)     Gran % 73.5 (*)     Lymph % 13.6 (*)     All other components within normal limits   COMPREHENSIVE METABOLIC PANEL - Abnormal; Notable for the following components:    Sodium 134 (*)     Glucose 65 (*)     BUN 28 (*)     Creatinine 4.0 (*)     Albumin 3.0 (*)     Total Bilirubin 1.5 (*)     Alkaline Phosphatase 263 (*)     ALT 58 (*)     eGFR 14.5 (*)     All other components within normal limits   TROPONIN I - Abnormal; Notable for the following components:    Troponin I 0.039 (*)     All other components within normal limits   B-TYPE NATRIURETIC PEPTIDE - Abnormal; Notable for the following components:    BNP 3,578 (*)     All other components within normal limits   LACTIC ACID, PLASMA   TOXICOLOGY SCREEN, URINE, RANDOM (COMPLIANCE)   PREGNANCY TEST, URINE RAPID   POCT GLUCOSE   POCT GLUCOSE MONITORING CONTINUOUS     EKG Readings: (Independently Interpreted)   11/11/2022 13:27  Sinus tachycardia. V-rate 102 bpm. Normal axis. Normal QRS and QT intervals. No ST elevation or depression.     Imaging Results                CT Chest Abdomen Pelvis Without Contrast (XPD) (Final result)  Result time 11/11/22 18:38:42      Final result by Max Mancia MD (11/11/22 18:38:42)                   Impression:      This report was flagged in Epic as abnormal.    1. In comparison to examination 10/29/2022, pericardial effusion has worsened as well as worsening body wall anasarca and fluid in the pelvis, findings are concerning for volume overload, correlation is advised.  2. Patchy ground-glass and consolidation throughout the pulmonary parenchyma particularly involving the bilateral lower lobes findings are concerning for edema and superimposed developing infectious process.  Correlation is advised.  3. There is edema throughout the gastric walls, new since the previous exam.  This may be on the basis of volume overload or gastritis.  Correlation recommended.  4. Urinary bladder is mildly distended noting diffuse wall thickening, correlation with urinalysis advised to exclude cystitis.  The differential would potentially include sequela of recently passed calculus however infection is of concern.  Correlation advised.  5. Please see above for several additional findings.      Electronically signed by: Max Mancia MD  Date:    11/11/2022  Time:    18:38               Narrative:    EXAMINATION:  CT CHEST ABDOMEN PELVIS WITHOUT CONTRAST(XPD)    CLINICAL HISTORY:  n/v, abdo pain, cp;    TECHNIQUE:  Low dose axial images, sagittal and coronal reformations were obtained from the thoracic inlet to the pubic symphysis .  Oral contrast was not administered.    COMPARISON:  CTA chest 10/29/2022, CT abdomen and pelvis 10/26/2022    FINDINGS:  The structures at the base of the neck are remarkable for bilateral low attenuating thyroid nodules measuring up to 1.6 cm within the inferior aspects of the thyroid.  Nonemergent ultrasound could be performed for further evaluation as warranted.  There is a moderate pericardial effusion  worsened since the previous examination.  Right central venous catheter tip terminates at the level of the distal SVC/right atrial junction.  The heart is not enlarged.  The thoracic aorta tapers normally noting atherosclerotic calcification along its course.    The airways are patent.  There is patchy ground-glass attenuation throughout the pulmonary parenchyma bilaterally noting scattered regions of patchy consolidation within the bilateral lower lobes, left greater than right, also along the fissure on the left.  No pneumothorax.  No pleural effusion.  There is bilateral basilar dependent atelectasis.    Allowing for motion artifact, the liver is somewhat high attenuating, correlation with any history of iron overload or amiodarone use.  The spleen is enlarged.  The adrenal glands and pancreas are grossly unremarkable.  The stomach is decompressed noting there is edema throughout the gastric walls, correlation with any history of gastritis.  The gallbladder is surgically absent.  No significant biliary dilation.  No significant abdominal lymphadenopathy.    There is no right hydronephrosis or right nephrolithiasis noting right renal vascular calcification.  The right ureter is unable to be followed in its entirety to the urinary bladder, no definite calculi seen.  There is left perinephric fat stranding.  There is mild-to-moderate left hydronephrosis.  The left ureter is prominent.  No definite calculi seen along the course of the left ureter.  The urinary bladder is distended noting wall thickening.  The uterus and adnexa are grossly unremarkable.  There is fluid in the pelvis.    There are a few scattered colonic diverticula without inflammation.  There is moderate stool in the colon.  The terminal ileum is unremarkable.  The appendix is unremarkable.  The small bowel is grossly unremarkable.  There is strand-like fluid in the abdomen and pelvis.  No focal organized pelvic fluid collection.    No acute osseous  abnormality.  No significant inguinal lymphadenopathy.  No significant axillary lymphadenopathy.  There is diffuse body wall anasarca.                                       X-Ray Chest AP Portable (Final result)  Result time 11/11/22 14:41:14      Final result by Darius Esquivel MD (11/11/22 14:41:14)                   Impression:      Stable position of large-bore right jugular catheter and enlargement of cardiopericardial silhouette with no active pulmonary findings and no detrimental changes compared to earlier exam      Electronically signed by: Darius Esquivel  Date:    11/11/2022  Time:    14:41               Narrative:    EXAMINATION:  XR CHEST AP PORTABLE    CLINICAL HISTORY:  Shortness of breath;    TECHNIQUE:  Single frontal view of the chest was performed.    COMPARISON:  November 6, 2022    FINDINGS:  Reconfirmed large-bore right jugular catheter, tip superimposing right atrial soft tissues as before.  Stable enlargement of cardiopericardial silhouette.  Normal pulmonary vasculature.  No focal infiltrates.  No pleural effusions or pneumothorax.  No acute bony findings.                                       Medications                                                                                                HYDROmorphone injection 1 mg (1 mg Intravenous Given 11/11/22 1403)   droperidoL injection 1.25 mg (1.25 mg Intravenous Given 11/11/22 1403)   hydrALAZINE injection 10 mg (10 mg Intravenous Given 11/11/22 1403)   hydrALAZINE injection 10 mg (10 mg Intravenous Given 11/11/22 1529)   HYDROmorphone injection 1 mg (1 mg Intravenous Given 11/11/22 1646)                      Attending Attestation:         Attending Critical Care:   Critical Care Times:   Direct Patient Care (initial evaluation, reassessments, and time considering the case)................................................................20 minutes.   Additional History from reviewing old medical records or taking additional history from the  family, EMS, PCP, etc.......................5 minutes.   Ordering, Reviewing, and Interpreting Diagnostic Studies...............................................................................................................2 minutes.   Documentation..................................................................................................................................................................................8 minutes.   ==============================================================  Total Critical Care Time - exclusive of procedural time: 35 minutes.  ==============================================================  Critical care was necessary to treat or prevent imminent or life-threatening deterioration of the following conditions: renal failure and hypertensive urgency.   Critical care was time spent personally by me on the following activities: obtaining history from patient or relative, examination of patient, review of old charts, ordering lab, x-rays, and/or EKG, development of treatment plan with patient or relative, ordering and performing treatments and interventions, evaluation of patient's response to treatment, discussion with consultants, interpretation of cardiac measurements and re-evaluation of patient's conition.   Critical Care Condition: potentially life-threatening   Critical Care Comments: This was an emergent evaluation.  The patient had abdominal pain, nausea, and vomiting.  She was in severe distress at the time of my initial evaluation and was treated with multiple doses of IV pain medication.  She also received IV nausea medication.  Presentation, exam, workup not concerning for sepsis or acute infection.  Her blood pressure was very elevated on arrival.  It remained elevated after multiple doses of IV hydralazine.  She was admitted to Hospital Medicine for additional pain management and nephrology consultation for urgent hemodialysis.         ED Course as of 11/12/22 9201    Fri Nov 11, 2022   1328 EKG received/reviewed.  No STEMI. [LP]   1631 Pt still having 10/10 pain. [LP]      ED Course User Index  [LP] Bob Esteban III, MD                 Clinical Impression:   Final diagnoses:  [N18.6, Z99.2] ESRD (end stage renal disease) on dialysis  [R07.9] Chest pain  [K31.84] Gastroparesis  [R10.9] Intractable abdominal pain (Primary)  [E87.70] Hypervolemia, unspecified hypervolemia type        ED Disposition Condition    Observation Stable                Bob Esteban III, MD  11/12/22 7614

## 2022-11-11 NOTE — SUBJECTIVE & OBJECTIVE
Past Medical History:   Diagnosis Date    CKD (chronic kidney disease), stage IV 2022    Diabetes mellitus due to underlying condition with unspecified complications 2022    Gastroparesis 2022    Heart failure with preserved ejection fraction 2022    EF 55% on 3/22    History of gastroesophageal reflux (GERD)     History of supraventricular tachycardia     Hyperkalemia 2022    Hypertensive emergency 2022    Sickle cell trait 2022    Type 2 diabetes mellitus        Past Surgical History:   Procedure Laterality Date     SECTION      x 3    COLONOSCOPY      COLONOSCOPY N/A 2022    Procedure: COLONOSCOPY;  Surgeon: Jagdeep Cedeno MD;  Location: [facility];  Service: Endoscopy;  Laterality: N/A;    ESOPHAGOGASTRODUODENOSCOPY N/A 10/18/2019    Procedure: ESOPHAGOGASTRODUODENOSCOPY (EGD);  Surgeon: Gianluca Mendez MD;  Location: [facility];  Service: Endoscopy;  Laterality: N/A;    ESOPHAGOGASTRODUODENOSCOPY N/A 2022    Procedure: EGD (ESOPHAGOGASTRODUODENOSCOPY);  Surgeon: Micky Paredes III, MD;  Location: [facility];  Service: Endoscopy;  Laterality: N/A;    LAPAROSCOPIC CHOLECYSTECTOMY N/A 2022    Procedure: CHOLECYSTECTOMY, LAPAROSCOPIC;  Surgeon: Grey Perez MD;  Location: [facility];  Service: General;  Laterality: N/A;    PLACEMENT OF DUAL-LUMEN VASCULAR CATHETER Left 2022    Procedure: INSERTION-CATHETER-JOSEPH;  Surgeon: Dionte Gan MD;  Location: [facility] OR;  Service: General;  Laterality: Left;    PLACEMENT OF DUAL-LUMEN VASCULAR CATHETER Right 2022    Procedure: INSERTION-CATHETER-Hemosplit;  Surgeon: Dionte Gan MD;  Location: [facility] OR;  Service: General;  Laterality: Right;       Review of patient's allergies indicates:   Allergen Reactions    Penicillins Hives       No current facility-administered medications on file prior to encounter.     Current Outpatient Medications on File Prior to Encounter   Medication Sig    albuterol  (PROVENTIL/VENTOLIN HFA) 90 mcg/actuation inhaler Inhale 2 puffs into the lungs every 6 (six) hours as needed for Wheezing. Rescue    amLODIPine (NORVASC) 10 MG tablet Take 1 tablet (10 mg total) by mouth once daily.    apixaban (ELIQUIS) 5 mg Tab Take 1 tablet (5 mg total) by mouth 2 (two) times daily. For second month on.    carvediloL (COREG) 25 MG tablet Take 1 tablet (25 mg total) by mouth 2 (two) times daily.    cloNIDine 0.2 mg/24 hr td ptwk (CATAPRES) 0.2 mg/24 hr Place 1 patch onto the skin every 7 days.    FLUoxetine 20 MG capsule Take 1 capsule (20 mg total) by mouth once daily.    furosemide (LASIX) 40 MG tablet Take 1 tablet (40 mg total) by mouth 2 (two) times daily.    gabapentin (NEURONTIN) 100 MG capsule Take 1 capsule (100 mg total) by mouth 3 (three) times daily.    hydrALAZINE (APRESOLINE) 100 MG tablet Take 1 tablet (100 mg total) by mouth every 8 (eight) hours.    HYDROcodone-acetaminophen (NORCO) 5-325 mg per tablet Take 1 tablet by mouth every 6 (six) hours as needed for Pain.    insulin aspart U-100 (NOVOLOG) 100 unit/mL (3 mL) InPn pen Inject 1-10 Units into the skin before meals and at bedtime as needed (Hyperglycemia).    isosorbide mononitrate (IMDUR) 60 MG 24 hr tablet Take 2 tablets (120 mg total) by mouth once daily.    LANTUS U-100 INSULIN 100 unit/mL injection Inject 5 Units into the skin once daily.    levETIRAcetam (KEPPRA) 500 MG Tab Take 1 tablet (500 mg total) by mouth 2 (two) times daily.    ondansetron (ZOFRAN-ODT) 4 MG TbDL Dissolve 1 tablet (4 mg total) by mouth every 8 (eight) hours as needed (nausea, vomiting).    promethazine (PHENERGAN) 25 MG tablet Take 1 tablet (25 mg total) by mouth every 6 (six) hours as needed for Nausea.    [DISCONTINUED] atenoloL (TENORMIN) 50 MG tablet Take 1 tablet (50 mg total) by mouth every other day.    [DISCONTINUED] omeprazole (PRILOSEC) 20 MG capsule Take 2 capsules (40 mg total) by mouth once daily. for 10 days    [DISCONTINUED]  torsemide (DEMADEX) 20 MG Tab Take 1 tablet (20 mg total) by mouth 2 (two) times a day. (Patient taking differently: Take 20 mg by mouth once daily.)     Family History       Problem Relation (Age of Onset)    Diabetes Mother, Father          Tobacco Use    Smoking status: Never    Smokeless tobacco: Never   Substance and Sexual Activity    Alcohol use: Not Currently    Drug use: No    Sexual activity: Not Currently     Partners: Male     Birth control/protection: I.U.D.     Review of Systems   Constitutional:  Positive for fatigue. Negative for fever.   HENT:  Negative for congestion, sore throat and trouble swallowing.    Eyes:  Positive for visual disturbance.   Respiratory:  Positive for cough (dry). Negative for shortness of breath and wheezing.    Cardiovascular:  Positive for chest pain. Negative for palpitations and leg swelling.   Gastrointestinal:  Positive for abdominal pain, nausea and vomiting. Negative for blood in stool, constipation and diarrhea.   Genitourinary:  Negative for dysuria.   Musculoskeletal:  Positive for arthralgias and myalgias.   Skin:  Negative for rash and wound.   Neurological:  Positive for dizziness, light-headedness and headaches.   Psychiatric/Behavioral:  Negative for agitation.      Objective:     Vital Signs (Most Recent):  Temp: 98 °F (36.7 °C) (11/11/22 1532)  Pulse: 91 (11/11/22 1632)  Resp: 19 (11/11/22 1532)  BP: (!) 180/114 (11/11/22 1632)  SpO2: 98 % (11/11/22 1632)   Vital Signs (24h Range):  Temp:  [98 °F (36.7 °C)-98.1 °F (36.7 °C)] 98 °F (36.7 °C)  Pulse:  [] 91  Resp:  [18-25] 19  SpO2:  [96 %-100 %] 98 %  BP: (165-209)/() 180/114        Body mass index is 24.14 kg/m².    Physical Exam  Vitals and nursing note reviewed.   Constitutional:       General: She is not in acute distress.     Appearance: She is not toxic-appearing or diaphoretic. Ill appearance: Chronically.     Comments: Sleepy, arouses to voice   HENT:      Head: Normocephalic and  atraumatic.      Right Ear: External ear normal.      Left Ear: External ear normal.      Nose: Nose normal.      Mouth/Throat:      Mouth: Mucous membranes are dry.      Pharynx: Oropharynx is clear.   Eyes:      General: No scleral icterus.        Right eye: No discharge.         Left eye: No discharge.      Extraocular Movements: Extraocular movements intact.      Pupils: Pupils are equal, round, and reactive to light.   Cardiovascular:      Rate and Rhythm: Normal rate and regular rhythm.      Pulses: Normal pulses.      Heart sounds: Normal heart sounds.   Pulmonary:      Effort: Pulmonary effort is normal.      Breath sounds: Normal breath sounds.   Abdominal:      General: Bowel sounds are normal.      Tenderness: There is abdominal tenderness (Generalized). There is no guarding or rebound.   Musculoskeletal:      Cervical back: Normal range of motion and neck supple.      Right lower leg: No edema.      Left lower leg: No edema.   Skin:     General: Skin is warm and dry.      Capillary Refill: Capillary refill takes less than 2 seconds.   Neurological:      General: No focal deficit present.      Mental Status: She is oriented to person, place, and time.   Psychiatric:         Speech: Speech is delayed.         Behavior: Behavior is slowed.         CRANIAL NERVES     CN III, IV, VI   Pupils are equal, round, and reactive to light.     Significant Labs: All pertinent labs within the past 24 hours have been reviewed.    Recent Results (from the past 24 hour(s))   POCT glucose    Collection Time: 11/11/22 12:39 PM   Result Value Ref Range    POCT Glucose 82 70 - 110 mg/dL   CBC auto differential    Collection Time: 11/11/22  2:02 PM   Result Value Ref Range    WBC 6.41 3.90 - 12.70 K/uL    RBC 3.97 (L) 4.00 - 5.40 M/uL    Hemoglobin 11.3 (L) 12.0 - 16.0 g/dL    Hematocrit 32.7 (L) 37.0 - 48.5 %    MCV 82 82 - 98 fL    MCH 28.5 27.0 - 31.0 pg    MCHC 34.6 32.0 - 36.0 g/dL    RDW 15.8 (H) 11.5 - 14.5 %     Platelets 148 (L) 150 - 450 K/uL    MPV 9.4 9.2 - 12.9 fL    Immature Granulocytes 0.6 (H) 0.0 - 0.5 %    Gran # (ANC) 4.7 1.8 - 7.7 K/uL    Immature Grans (Abs) 0.04 0.00 - 0.04 K/uL    Lymph # 0.9 (L) 1.0 - 4.8 K/uL    Mono # 0.7 0.3 - 1.0 K/uL    Eos # 0.1 0.0 - 0.5 K/uL    Baso # 0.04 0.00 - 0.20 K/uL    nRBC 0 0 /100 WBC    Gran % 73.5 (H) 38.0 - 73.0 %    Lymph % 13.6 (L) 18.0 - 48.0 %    Mono % 10.6 4.0 - 15.0 %    Eosinophil % 1.1 0.0 - 8.0 %    Basophil % 0.6 0.0 - 1.9 %    Differential Method Automated    Comprehensive metabolic panel    Collection Time: 11/11/22  2:02 PM   Result Value Ref Range    Sodium 134 (L) 136 - 145 mmol/L    Potassium 4.1 3.5 - 5.1 mmol/L    Chloride 98 95 - 110 mmol/L    CO2 23 23 - 29 mmol/L    Glucose 65 (L) 70 - 110 mg/dL    BUN 28 (H) 6 - 20 mg/dL    Creatinine 4.0 (H) 0.5 - 1.4 mg/dL    Calcium 9.6 8.7 - 10.5 mg/dL    Total Protein 6.7 6.0 - 8.4 g/dL    Albumin 3.0 (L) 3.5 - 5.2 g/dL    Total Bilirubin 1.5 (H) 0.1 - 1.0 mg/dL    Alkaline Phosphatase 263 (H) 55 - 135 U/L    AST 33 10 - 40 U/L    ALT 58 (H) 10 - 44 U/L    Anion Gap 13 8 - 16 mmol/L    eGFR 14.5 (A) >60 mL/min/1.73 m^2   Lactic acid, plasma    Collection Time: 11/11/22  2:02 PM   Result Value Ref Range    Lactate (Lactic Acid) 0.9 0.5 - 2.2 mmol/L   Troponin I    Collection Time: 11/11/22  2:02 PM   Result Value Ref Range    Troponin I 0.039 (H) 0.000 - 0.026 ng/mL   B-Type natriuretic peptide (BNP)    Collection Time: 11/11/22  2:02 PM   Result Value Ref Range    BNP 3,578 (H) 0 - 99 pg/mL         Significant Imaging: I have reviewed all pertinent imaging results/findings within the past 24 hours.    Imaging Results              X-Ray Chest AP Portable (Final result)  Result time 11/11/22 14:41:14      Final result by Darius Esquivel MD (11/11/22 14:41:14)                   Impression:      Stable position of large-bore right jugular catheter and enlargement of cardiopericardial silhouette with no active  pulmonary findings and no detrimental changes compared to earlier exam      Electronically signed by: Darius Esquivel  Date:    11/11/2022  Time:    14:41               Narrative:    EXAMINATION:  XR CHEST AP PORTABLE    CLINICAL HISTORY:  Shortness of breath;    TECHNIQUE:  Single frontal view of the chest was performed.    COMPARISON:  November 6, 2022    FINDINGS:  Reconfirmed large-bore right jugular catheter, tip superimposing right atrial soft tissues as before.  Stable enlargement of cardiopericardial silhouette.  Normal pulmonary vasculature.  No focal infiltrates.  No pleural effusions or pneumothorax.  No acute bony findings.

## 2022-11-11 NOTE — Clinical Note
Diagnosis: ESRD (end stage renal disease) on dialysis [[id number]]   Future Attending Provider: TOMASZ LUTHER [[id number]]   Admitting Provider:: TOMASZ LUTHER [[id number]]

## 2022-11-12 LAB
ALBUMIN SERPL BCP-MCNC: 2.3 G/DL (ref 3.5–5.2)
ALP SERPL-CCNC: 207 U/L (ref 55–135)
ALT SERPL W/O P-5'-P-CCNC: 40 U/L (ref 10–44)
ANION GAP SERPL CALC-SCNC: 12 MMOL/L (ref 8–16)
AST SERPL-CCNC: 25 U/L (ref 10–40)
BACTERIA BLD CULT: NORMAL
BASOPHILS # BLD AUTO: 0.02 K/UL (ref 0–0.2)
BASOPHILS NFR BLD: 0.4 % (ref 0–1.9)
BILIRUB SERPL-MCNC: 1.1 MG/DL (ref 0.1–1)
BUN SERPL-MCNC: 34 MG/DL (ref 6–20)
CALCIUM SERPL-MCNC: 8.5 MG/DL (ref 8.7–10.5)
CHLORIDE SERPL-SCNC: 100 MMOL/L (ref 95–110)
CO2 SERPL-SCNC: 23 MMOL/L (ref 23–29)
CREAT SERPL-MCNC: 4.7 MG/DL (ref 0.5–1.4)
DIFFERENTIAL METHOD: ABNORMAL
EOSINOPHIL # BLD AUTO: 0.1 K/UL (ref 0–0.5)
EOSINOPHIL NFR BLD: 1 % (ref 0–8)
ERYTHROCYTE [DISTWIDTH] IN BLOOD BY AUTOMATED COUNT: 16.1 % (ref 11.5–14.5)
EST. GFR  (NO RACE VARIABLE): 11.9 ML/MIN/1.73 M^2
GLUCOSE SERPL-MCNC: 191 MG/DL (ref 70–110)
HCT VFR BLD AUTO: 31.8 % (ref 37–48.5)
HGB BLD-MCNC: 10.6 G/DL (ref 12–16)
IMM GRANULOCYTES # BLD AUTO: 0.02 K/UL (ref 0–0.04)
IMM GRANULOCYTES NFR BLD AUTO: 0.4 % (ref 0–0.5)
LYMPHOCYTES # BLD AUTO: 0.9 K/UL (ref 1–4.8)
LYMPHOCYTES NFR BLD: 18.4 % (ref 18–48)
MAGNESIUM SERPL-MCNC: 1.9 MG/DL (ref 1.6–2.6)
MCH RBC QN AUTO: 28.4 PG (ref 27–31)
MCHC RBC AUTO-ENTMCNC: 33.3 G/DL (ref 32–36)
MCV RBC AUTO: 85 FL (ref 82–98)
MONOCYTES # BLD AUTO: 0.4 K/UL (ref 0.3–1)
MONOCYTES NFR BLD: 8.2 % (ref 4–15)
NEUTROPHILS # BLD AUTO: 3.6 K/UL (ref 1.8–7.7)
NEUTROPHILS NFR BLD: 71.6 % (ref 38–73)
NRBC BLD-RTO: 0 /100 WBC
PHOSPHATE SERPL-MCNC: 5.4 MG/DL (ref 2.7–4.5)
PLATELET # BLD AUTO: 145 K/UL (ref 150–450)
PMV BLD AUTO: 9.4 FL (ref 9.2–12.9)
POCT GLUCOSE: 129 MG/DL (ref 70–110)
POCT GLUCOSE: 211 MG/DL (ref 70–110)
POCT GLUCOSE: 371 MG/DL (ref 70–110)
POTASSIUM SERPL-SCNC: 5 MMOL/L (ref 3.5–5.1)
PROT SERPL-MCNC: 5.4 G/DL (ref 6–8.4)
RBC # BLD AUTO: 3.73 M/UL (ref 4–5.4)
SODIUM SERPL-SCNC: 135 MMOL/L (ref 136–145)
WBC # BLD AUTO: 5.01 K/UL (ref 3.9–12.7)

## 2022-11-12 PROCEDURE — 80053 COMPREHEN METABOLIC PANEL: CPT | Performed by: STUDENT IN AN ORGANIZED HEALTH CARE EDUCATION/TRAINING PROGRAM

## 2022-11-12 PROCEDURE — 99215 OFFICE O/P EST HI 40 MIN: CPT | Mod: 25,,, | Performed by: NURSE PRACTITIONER

## 2022-11-12 PROCEDURE — G0257 UNSCHED DIALYSIS ESRD PT HOS: HCPCS

## 2022-11-12 PROCEDURE — 93005 ELECTROCARDIOGRAM TRACING: CPT

## 2022-11-12 PROCEDURE — 36415 COLL VENOUS BLD VENIPUNCTURE: CPT | Performed by: STUDENT IN AN ORGANIZED HEALTH CARE EDUCATION/TRAINING PROGRAM

## 2022-11-12 PROCEDURE — 99225 PR SUBSEQUENT OBSERVATION CARE,LEVEL II: ICD-10-PCS | Mod: ,,, | Performed by: STUDENT IN AN ORGANIZED HEALTH CARE EDUCATION/TRAINING PROGRAM

## 2022-11-12 PROCEDURE — 90935 PR HEMODIALYSIS, ONE EVALUATION: ICD-10-PCS | Mod: ,,, | Performed by: NURSE PRACTITIONER

## 2022-11-12 PROCEDURE — 99215 PR OFFICE/OUTPT VISIT, EST, LEVL V, 40-54 MIN: ICD-10-PCS | Mod: 25,,, | Performed by: NURSE PRACTITIONER

## 2022-11-12 PROCEDURE — 63600175 PHARM REV CODE 636 W HCPCS: Performed by: NURSE PRACTITIONER

## 2022-11-12 PROCEDURE — G0378 HOSPITAL OBSERVATION PER HR: HCPCS

## 2022-11-12 PROCEDURE — 25000003 PHARM REV CODE 250: Performed by: NURSE PRACTITIONER

## 2022-11-12 PROCEDURE — 90935 HEMODIALYSIS ONE EVALUATION: CPT | Mod: ,,, | Performed by: NURSE PRACTITIONER

## 2022-11-12 PROCEDURE — 96372 THER/PROPH/DIAG INJ SC/IM: CPT | Performed by: STUDENT IN AN ORGANIZED HEALTH CARE EDUCATION/TRAINING PROGRAM

## 2022-11-12 PROCEDURE — 83735 ASSAY OF MAGNESIUM: CPT | Performed by: STUDENT IN AN ORGANIZED HEALTH CARE EDUCATION/TRAINING PROGRAM

## 2022-11-12 PROCEDURE — 25000003 PHARM REV CODE 250: Performed by: STUDENT IN AN ORGANIZED HEALTH CARE EDUCATION/TRAINING PROGRAM

## 2022-11-12 PROCEDURE — 84100 ASSAY OF PHOSPHORUS: CPT | Performed by: STUDENT IN AN ORGANIZED HEALTH CARE EDUCATION/TRAINING PROGRAM

## 2022-11-12 PROCEDURE — 99225 PR SUBSEQUENT OBSERVATION CARE,LEVEL II: CPT | Mod: ,,, | Performed by: STUDENT IN AN ORGANIZED HEALTH CARE EDUCATION/TRAINING PROGRAM

## 2022-11-12 PROCEDURE — 25000003 PHARM REV CODE 250: Performed by: HOSPITALIST

## 2022-11-12 PROCEDURE — 93010 ELECTROCARDIOGRAM REPORT: CPT | Mod: ,,, | Performed by: INTERNAL MEDICINE

## 2022-11-12 PROCEDURE — 63600175 PHARM REV CODE 636 W HCPCS: Performed by: STUDENT IN AN ORGANIZED HEALTH CARE EDUCATION/TRAINING PROGRAM

## 2022-11-12 PROCEDURE — 93010 EKG 12-LEAD: ICD-10-PCS | Mod: ,,, | Performed by: INTERNAL MEDICINE

## 2022-11-12 PROCEDURE — 85025 COMPLETE CBC W/AUTO DIFF WBC: CPT | Performed by: STUDENT IN AN ORGANIZED HEALTH CARE EDUCATION/TRAINING PROGRAM

## 2022-11-12 PROCEDURE — 12000002 HC ACUTE/MED SURGE SEMI-PRIVATE ROOM

## 2022-11-12 RX ORDER — HEPARIN SODIUM 1000 [USP'U]/ML
1000 INJECTION, SOLUTION INTRAVENOUS; SUBCUTANEOUS
Status: DISCONTINUED | OUTPATIENT
Start: 2022-11-12 | End: 2022-11-15 | Stop reason: HOSPADM

## 2022-11-12 RX ORDER — SODIUM CHLORIDE 9 MG/ML
INJECTION, SOLUTION INTRAVENOUS ONCE
Status: COMPLETED | OUTPATIENT
Start: 2022-11-14 | End: 2022-11-14

## 2022-11-12 RX ORDER — HYDROXYZINE HYDROCHLORIDE 25 MG/1
25 TABLET, FILM COATED ORAL 3 TIMES DAILY PRN
Status: DISCONTINUED | OUTPATIENT
Start: 2022-11-12 | End: 2022-11-15 | Stop reason: HOSPADM

## 2022-11-12 RX ORDER — BUPRENORPHINE 2 MG/1
2 TABLET SUBLINGUAL EVERY 8 HOURS PRN
Status: DISCONTINUED | OUTPATIENT
Start: 2022-11-12 | End: 2022-11-14

## 2022-11-12 RX ORDER — SODIUM CHLORIDE 9 MG/ML
INJECTION, SOLUTION INTRAVENOUS ONCE
Status: COMPLETED | OUTPATIENT
Start: 2022-11-12 | End: 2022-11-12

## 2022-11-12 RX ORDER — BUPRENORPHINE 2 MG/1
2 TABLET SUBLINGUAL 3 TIMES DAILY PRN
Status: DISCONTINUED | OUTPATIENT
Start: 2022-11-12 | End: 2022-11-12

## 2022-11-12 RX ORDER — ERYTHROMYCIN 250 MG/1
500 TABLET, DELAYED RELEASE ORAL
Status: DISCONTINUED | OUTPATIENT
Start: 2022-11-12 | End: 2022-11-14

## 2022-11-12 RX ADMIN — INSULIN DETEMIR 5 UNITS: 100 INJECTION, SOLUTION SUBCUTANEOUS at 01:11

## 2022-11-12 RX ADMIN — ERYTHROMYCIN 500 MG: 250 TABLET, DELAYED RELEASE ORAL at 04:11

## 2022-11-12 RX ADMIN — FLUOXETINE 20 MG: 20 CAPSULE ORAL at 01:11

## 2022-11-12 RX ADMIN — INSULIN ASPART 3 UNITS: 100 INJECTION, SOLUTION INTRAVENOUS; SUBCUTANEOUS at 09:11

## 2022-11-12 RX ADMIN — BUPRENORPHINE 2 MG: 2 TABLET SUBLINGUAL at 01:11

## 2022-11-12 RX ADMIN — HYDRALAZINE HYDROCHLORIDE 100 MG: 25 TABLET, FILM COATED ORAL at 04:11

## 2022-11-12 RX ADMIN — CARVEDILOL 25 MG: 25 TABLET, FILM COATED ORAL at 01:11

## 2022-11-12 RX ADMIN — HEPARIN SODIUM 1000 UNITS: 1000 INJECTION, SOLUTION INTRAVENOUS; SUBCUTANEOUS at 12:11

## 2022-11-12 RX ADMIN — HYDRALAZINE HYDROCHLORIDE 100 MG: 25 TABLET, FILM COATED ORAL at 11:11

## 2022-11-12 RX ADMIN — LEVETIRACETAM 500 MG: 500 TABLET, FILM COATED ORAL at 09:11

## 2022-11-12 RX ADMIN — HYDROXYZINE HYDROCHLORIDE 25 MG: 25 TABLET, FILM COATED ORAL at 09:11

## 2022-11-12 RX ADMIN — GABAPENTIN 100 MG: 300 CAPSULE ORAL at 01:11

## 2022-11-12 RX ADMIN — DICLOFENAC SODIUM 2 G: 10 GEL TOPICAL at 02:11

## 2022-11-12 RX ADMIN — AMLODIPINE BESYLATE 10 MG: 10 TABLET ORAL at 01:11

## 2022-11-12 RX ADMIN — KETOROLAC TROMETHAMINE 15 MG: 30 INJECTION, SOLUTION INTRAMUSCULAR; INTRAVENOUS at 05:11

## 2022-11-12 RX ADMIN — ACETAMINOPHEN 1000 MG: 500 TABLET ORAL at 02:11

## 2022-11-12 RX ADMIN — GABAPENTIN 100 MG: 300 CAPSULE ORAL at 09:11

## 2022-11-12 RX ADMIN — ISOSORBIDE MONONITRATE 120 MG: 60 TABLET, EXTENDED RELEASE ORAL at 01:11

## 2022-11-12 RX ADMIN — CARVEDILOL 25 MG: 25 TABLET, FILM COATED ORAL at 09:11

## 2022-11-12 RX ADMIN — LEVETIRACETAM 500 MG: 500 TABLET, FILM COATED ORAL at 01:11

## 2022-11-12 RX ADMIN — FUROSEMIDE 40 MG: 40 TABLET ORAL at 09:11

## 2022-11-12 RX ADMIN — INSULIN ASPART 5 UNITS: 100 INJECTION, SOLUTION INTRAVENOUS; SUBCUTANEOUS at 05:11

## 2022-11-12 RX ADMIN — SODIUM CHLORIDE: 0.9 INJECTION, SOLUTION INTRAVENOUS at 09:11

## 2022-11-12 RX ADMIN — INSULIN ASPART 2 UNITS: 100 INJECTION, SOLUTION INTRAVENOUS; SUBCUTANEOUS at 08:11

## 2022-11-12 RX ADMIN — HYDRALAZINE HYDROCHLORIDE 100 MG: 25 TABLET, FILM COATED ORAL at 05:11

## 2022-11-12 RX ADMIN — ERYTHROMYCIN 500 MG: 250 TABLET, DELAYED RELEASE ORAL at 01:11

## 2022-11-12 RX ADMIN — FUROSEMIDE 40 MG: 40 TABLET ORAL at 01:11

## 2022-11-12 RX ADMIN — APIXABAN 5 MG: 5 TABLET, FILM COATED ORAL at 09:11

## 2022-11-12 RX ADMIN — GABAPENTIN 100 MG: 300 CAPSULE ORAL at 02:11

## 2022-11-12 RX ADMIN — APIXABAN 5 MG: 5 TABLET, FILM COATED ORAL at 01:11

## 2022-11-12 NOTE — PROGRESS NOTES
Report given to dialysis nurse - telemetry reports patient off monitory - patient off the unit in dialysis - requested dialysis to correct tele issue.

## 2022-11-12 NOTE — PROGRESS NOTES
"Patient given buprenorphine at 1318. Instructed to place medication under her tongue. Approximately an hour later patient called this nurse complaining of pain 10/10. Nurse advised patient that she received pain medication an hour ago. Patient argumentative with nurse stating she did not receive it. Charge nurse Elidia notified. Medication was scanned in the room in the patients presence, placed in a cup and given to patient. Nurse witnessed patient take medication. Patient states "well I guess I will have to take tylenol." Tylenol given according to the MAR.    "

## 2022-11-12 NOTE — ASSESSMENT & PLAN NOTE
32 y.o. Black or  Female ESRD-HD M-W-F presents to ED on 11/11/2022 with concerns for gastroparesis.   Nephrology consulted for inpatient ESRD-HD management    Outpatient HD Information:  -Dialysis modality: Hemodialysis  -Outpatient HD unit: [facility]  -Nephrologist: ?  -HD TX days: Monday/Wednesday/Friday, duration of treatment: 4 hrs  -Last HD TX prior to hospital admission: 11/09  -Dialysis access: dialysis catheter   -Residual urine: Anuric   -EDW: 56 kg     Assessment:   - Will provide dialysis today for metabolic clearance and volume management  - Seen on HD. No complaints.   - Labs reviewed and dialysate to be adjusted to current labs.   - Will obtain OP dialysis records  - Continue to monitor intake and output  - Pre and Post-HD weights   - Please avoid gadolinium, fleets, phos-based laxatives, NSAIDs  - Will evaluate HD requirements Daily    Anemia of ESRD   Recent Labs   Lab 11/07/22  0349 11/11/22  1402 11/12/22  0448   WBC 6.47 6.41 5.01   HGB 9.4* 11.3* 10.6*   HCT 29.0* 32.7* 31.8*   * 148* 145*       - Goal in ESRD is Hgb of 10-11.   - EPO can be administered and dosed per his OP unit upon discharge.    Mineral Bone Disease in ESRD   Lab Results   Component Value Date    .8 (H) 07/08/2022    CALCIUM 8.5 (L) 11/12/2022    PHOS 5.4 (H) 11/12/2022       - F/U PO4, Mg, Calcium. And albumin levels.   - Renal diet with protein intake goal 1.5 g/kg/d with 1 L fluid restriction   - Novasource with meals  - Continue home phos binder   - Daily renal panel so that phos and albumin is monitored daily.     Hypertension    - BP Normal  - No lab stick or BP intake on access site.

## 2022-11-12 NOTE — SUBJECTIVE & OBJECTIVE
Past Medical History:   Diagnosis Date    CKD (chronic kidney disease), stage IV 2022    Diabetes mellitus due to underlying condition with unspecified complications 2022    Gastroparesis 2022    Heart failure with preserved ejection fraction 2022    EF 55% on 3/22    History of gastroesophageal reflux (GERD)     History of supraventricular tachycardia     Hyperkalemia 2022    Hypertensive emergency 2022    Sickle cell trait 2022    Type 2 diabetes mellitus        Past Surgical History:   Procedure Laterality Date     SECTION      x 3    COLONOSCOPY      COLONOSCOPY N/A 2022    Procedure: COLONOSCOPY;  Surgeon: Jagdeep Cedeno MD;  Location: [facility];  Service: Endoscopy;  Laterality: N/A;    ESOPHAGOGASTRODUODENOSCOPY N/A 10/18/2019    Procedure: ESOPHAGOGASTRODUODENOSCOPY (EGD);  Surgeon: Gianluca Mendez MD;  Location: [facility];  Service: Endoscopy;  Laterality: N/A;    ESOPHAGOGASTRODUODENOSCOPY N/A 2022    Procedure: EGD (ESOPHAGOGASTRODUODENOSCOPY);  Surgeon: Micky Paredes III, MD;  Location: [facility];  Service: Endoscopy;  Laterality: N/A;    LAPAROSCOPIC CHOLECYSTECTOMY N/A 2022    Procedure: CHOLECYSTECTOMY, LAPAROSCOPIC;  Surgeon: Grey Perez MD;  Location: [facility];  Service: General;  Laterality: N/A;    PLACEMENT OF DUAL-LUMEN VASCULAR CATHETER Left 2022    Procedure: INSERTION-CATHETER-JOSEPH;  Surgeon: Dionte Gan MD;  Location: [facility] OR;  Service: General;  Laterality: Left;    PLACEMENT OF DUAL-LUMEN VASCULAR CATHETER Right 2022    Procedure: INSERTION-CATHETER-Hemosplit;  Surgeon: Dionte Gan MD;  Location: [facility] OR;  Service: General;  Laterality: Right;       Review of patient's allergies indicates:   Allergen Reactions    Penicillins Hives     Current Facility-Administered Medications   Medication Frequency    0.9%  NaCl infusion Once    acetaminophen tablet 1,000 mg Q8H PRN    albuterol inhaler 2 puff Q6H  PRN    amLODIPine tablet 10 mg Daily    apixaban tablet 5 mg BID    buprenorphine HCL SL tablet 2 mg Q8H PRN    carvediloL tablet 25 mg BID    dextrose 10% bolus 125 mL PRN    dextrose 10% bolus 250 mL PRN    diclofenac sodium 1 % gel 2 g Daily    erythromycin EC tablet 500 mg TID AC    FLUoxetine capsule 20 mg Daily    furosemide tablet 40 mg BID    gabapentin capsule 100 mg TID    glucagon (human recombinant) injection 1 mg PRN    glucose chewable tablet 16 g PRN    glucose chewable tablet 24 g PRN    hydrALAZINE tablet 100 mg Q8H    hydrOXYzine HCL tablet 25 mg TID PRN    insulin aspart U-100 pen 0-5 Units QID (AC + HS) PRN    insulin detemir U-100 pen 5 Units Daily    isosorbide mononitrate 24 hr tablet 120 mg Daily    ketorolac injection 15 mg Q6H PRN    labetalol 20 mg/4 mL (5 mg/mL) IV syring Q4H PRN    levETIRAcetam tablet 500 mg BID    LIDOcaine 5 % patch 1 patch Q24H    melatonin tablet 6 mg Nightly PRN    ondansetron injection 4 mg Q8H PRN    prochlorperazine injection Soln 5 mg Q6H PRN    sodium chloride 0.9% flush 10 mL PRN     Family History       Problem Relation (Age of Onset)    Diabetes Mother, Father          Tobacco Use    Smoking status: Never    Smokeless tobacco: Never   Substance and Sexual Activity    Alcohol use: Not Currently    Drug use: No    Sexual activity: Not Currently     Partners: Male     Birth control/protection: I.U.D.     Review of Systems   Constitutional:  Positive for fatigue. Negative for fever.   HENT:  Negative for congestion, sore throat and trouble swallowing.    Eyes:  Positive for visual disturbance.   Respiratory:  Positive for cough. Negative for shortness of breath and wheezing.    Cardiovascular:  Positive for chest pain. Negative for palpitations and leg swelling.   Gastrointestinal:  Positive for abdominal pain, nausea and vomiting. Negative for blood in stool, constipation and diarrhea.   Genitourinary:  Negative for dysuria.   Musculoskeletal:  Positive for  arthralgias and myalgias.   Skin:  Negative for rash and wound.   Neurological:  Positive for dizziness, light-headedness and headaches.   Psychiatric/Behavioral:  Negative for agitation.    Objective:     Vital Signs (Most Recent):  Temp: 98.1 °F (36.7 °C) (11/12/22 0850)  Pulse: 85 (11/12/22 0945)  Resp: 16 (11/12/22 0713)  BP: (!) 137/91 (11/12/22 0945)  SpO2: (!) 94 % (11/12/22 0713)  O2 Device (Oxygen Therapy): nasal cannula (11/12/22 0850)   Vital Signs (24h Range):  Temp:  [97.6 °F (36.4 °C)-98.4 °F (36.9 °C)] 98.1 °F (36.7 °C)  Pulse:  [] 85  Resp:  [16-25] 16  SpO2:  [94 %-100 %] 94 %  BP: (132-209)/() 137/91     Weight: 59.9 kg (132 lb 0.9 oz) (11/12/22 0711)  Body mass index is 24.15 kg/m².  Body surface area is 1.62 meters squared.    No intake/output data recorded.    Physical Exam  Vitals and nursing note reviewed.   Constitutional:       General: She is not in acute distress.     Appearance: She is not toxic-appearing or diaphoretic. Ill appearance: Chronically.  HENT:      Head: Normocephalic and atraumatic.      Right Ear: External ear normal.      Left Ear: External ear normal.      Nose: Nose normal.      Mouth/Throat:      Mouth: Mucous membranes are dry.      Pharynx: Oropharynx is clear.   Eyes:      General: No scleral icterus.        Right eye: No discharge.         Left eye: No discharge.      Extraocular Movements: Extraocular movements intact.      Pupils: Pupils are equal, round, and reactive to light.   Cardiovascular:      Rate and Rhythm: Normal rate and regular rhythm.      Pulses: Normal pulses.      Heart sounds: Normal heart sounds.   Pulmonary:      Effort: Pulmonary effort is normal.      Breath sounds: Normal breath sounds.   Abdominal:      General: Bowel sounds are normal.      Tenderness: There is abdominal tenderness (Generalized). There is no guarding or rebound.   Musculoskeletal:      Cervical back: Normal range of motion and neck supple.      Right lower leg:  No edema.      Left lower leg: No edema.   Skin:     General: Skin is warm and dry.      Capillary Refill: Capillary refill takes less than 2 seconds.   Neurological:      General: No focal deficit present.      Mental Status: She is oriented to person, place, and time.   Psychiatric:         Speech: Speech is delayed.         Behavior: Behavior is slowed.       Significant Labs:  CBC:   Recent Labs   Lab 11/12/22  0448   WBC 5.01   RBC 3.73*   HGB 10.6*   HCT 31.8*   *   MCV 85   MCH 28.4   MCHC 33.3     CMP:   Recent Labs   Lab 11/12/22  0448   *   CALCIUM 8.5*   ALBUMIN 2.3*   PROT 5.4*   *   K 5.0   CO2 23      BUN 34*   CREATININE 4.7*   ALKPHOS 207*   ALT 40   AST 25   BILITOT 1.1*     All labs within the past 24 hours have been reviewed.

## 2022-11-12 NOTE — HOSPITAL COURSE
Lipase within normal limits.  For completeness, CT chest abdomen pelvis without contrast obtained.  In comparison to prior, notable for worsening pericardial effusion as well as worsening body wall anasarca and fluid in the pelvis concerning for volume overload.  Edema throughout the gastric walls was new since prior exam, likely due to volume overload in setting of presentation.  Patient with improvement of symptoms following HD initiation.

## 2022-11-12 NOTE — ASSESSMENT & PLAN NOTE
Patient's FSGs are controlled on current medication regimen.  Last A1c reviewed-   Lab Results   Component Value Date    HGBA1C 6.2 08/24/2022     Most recent fingerstick glucose reviewed-   Recent Labs   Lab 11/11/22  1239   POCTGLUCOSE 82     Current correctional scale  Low  Maintain anti-hyperglycemic dose as follows-   Antihyperglycemics (From admission, onward)    Start     Stop Route Frequency Ordered    11/12/22 0900  insulin detemir U-100 pen 5 Units         -- SubQ Daily 11/11/22 1748    11/11/22 1912  insulin aspart U-100 pen 0-5 Units         -- SubQ Before meals & nightly PRN 11/11/22 1812        Hold Oral hypoglycemics while patient is in the hospital.

## 2022-11-12 NOTE — ASSESSMENT & PLAN NOTE
"Abdominal pain   Epigastric pain  Nausea and vomiting    Multiple admissions for similar symptomology per chart review.  Further, prior hospital medicine notes comment on patient distractibility on examined concern for pain seeking behavior.  As per a prior admission, MD note with "She has very dramatic behavior, likes only IV Dilaudid"  Patient afebrile, hypertensive upon admission, on room air.  Labs stable to improved compared to prior admission; do not indicate acute intra-abdominal pathology.  Lactic acid negative.  Troponin improved from prior.  BNP improved from prior.  EKG without changes suggesting ischemia.    Initiate erythromycin for management of suspected gastroparesis; Reglan contraindicated due to seizure history  Supportive care with IV fluids as needed (mindful volume status), antiemetics prn  Avoid opioids; discussed with patient  Multimodal pain control with Tylenol, gabapentin, Toradol (will be mindful of chronic thrombocytopenia which is stable), Voltaren gel, lidocaine patch  Lipase   CT chest, abdomen, pelvis for completeness, though noted that prior studies largely unremarkable (urine pregnancy test)  Advance diet as tolerated  QTC monitoring  "

## 2022-11-12 NOTE — H&P
[facility] - Emergency Dept  [facility]  History & Physical    Patient Name: Tabby Howard  MRN: [id number]  Patient Class: OP- Observation  Admission Date: 11/11/2022  Attending Physician: Ilana Tillman MD   Primary Care Provider: Primary Doctor No         Patient information was obtained from patient, past medical records and ER records.     Subjective:     Principal Problem:Gastroparesis    Chief Complaint:   Chief Complaint   Patient presents with    Generalized Body Aches     Pt reporting generalized body pain. Last dialysis was Wednesday.         HPI: Tabby Howard is a 31 y/o F w/ hx of ESRD on HD, DM, HFpEF, GERD, suspected gastroparesis, sickle cell trait, HTN, with a recent episode of C diff who presents to the emergency department with complaints of generalized abdominal pain with nausea and vomiting, diffuse back pain, and mid-sternal chest pain that began at 6 in the morning.  Patient is poor historian; suspect due to grogginess following administration of IV Dilaudid in ED. she is unable to clearly engage in conversation regarding symptomology following recent discharge from hospital (admitted from 10/29-11/01).  Upon review of medical records, patient with multiple admissions for similar symptoms.  When asked specifically, she notes that emesis nonbloody, nonbilious; notes 3 episodes at home prior to presentation.  Describes abdominal pain as generalized ache.  In terms of her chest pain, she notes tightness which is epigastric.  She also endorses headache, lightheadedness, dizziness.  Her visual changes are (left eye haziness) stable.  Her last dialysis session was on Wednesday; she is unable to comment if she completed full session or if any volume removed.  She denies recent THC use.  She notes adherence to her medication regimen.      Past Medical History:   Diagnosis Date    CKD (chronic kidney disease), stage IV 4/12/2022    Diabetes mellitus due to underlying condition with unspecified  complications 2022    Gastroparesis 2022    Heart failure with preserved ejection fraction 2022    EF 55% on 3/22    History of gastroesophageal reflux (GERD)     History of supraventricular tachycardia     Hyperkalemia 2022    Hypertensive emergency 2022    Sickle cell trait 2022    Type 2 diabetes mellitus        Past Surgical History:   Procedure Laterality Date     SECTION      x 3    COLONOSCOPY      COLONOSCOPY N/A 2022    Procedure: COLONOSCOPY;  Surgeon: Jagdeep Cedeno MD;  Location: [facility];  Service: Endoscopy;  Laterality: N/A;    ESOPHAGOGASTRODUODENOSCOPY N/A 10/18/2019    Procedure: ESOPHAGOGASTRODUODENOSCOPY (EGD);  Surgeon: Gianluca Mendez MD;  Location: [facility];  Service: Endoscopy;  Laterality: N/A;    ESOPHAGOGASTRODUODENOSCOPY N/A 2022    Procedure: EGD (ESOPHAGOGASTRODUODENOSCOPY);  Surgeon: Micky Paredes III, MD;  Location: [facility];  Service: Endoscopy;  Laterality: N/A;    LAPAROSCOPIC CHOLECYSTECTOMY N/A 2022    Procedure: CHOLECYSTECTOMY, LAPAROSCOPIC;  Surgeon: Grey Perez MD;  Location: [facility] OR;  Service: General;  Laterality: N/A;    PLACEMENT OF DUAL-LUMEN VASCULAR CATHETER Left 2022    Procedure: INSERTION-CATHETER-JOSEPH;  Surgeon: Dionte Gan MD;  Location: [facility] OR;  Service: General;  Laterality: Left;    PLACEMENT OF DUAL-LUMEN VASCULAR CATHETER Right 2022    Procedure: INSERTION-CATHETER-Hemosplit;  Surgeon: Dionte Gan MD;  Location: [facility] OR;  Service: General;  Laterality: Right;       Review of patient's allergies indicates:   Allergen Reactions    Penicillins Hives       No current facility-administered medications on file prior to encounter.     Current Outpatient Medications on File Prior to Encounter   Medication Sig    albuterol (PROVENTIL/VENTOLIN HFA) 90 mcg/actuation inhaler Inhale 2 puffs into the lungs every 6 (six) hours as needed for Wheezing. Rescue     amLODIPine (NORVASC) 10 MG tablet Take 1 tablet (10 mg total) by mouth once daily.    apixaban (ELIQUIS) 5 mg Tab Take 1 tablet (5 mg total) by mouth 2 (two) times daily. For second month on.    carvediloL (COREG) 25 MG tablet Take 1 tablet (25 mg total) by mouth 2 (two) times daily.    cloNIDine 0.2 mg/24 hr td ptwk (CATAPRES) 0.2 mg/24 hr Place 1 patch onto the skin every 7 days.    FLUoxetine 20 MG capsule Take 1 capsule (20 mg total) by mouth once daily.    furosemide (LASIX) 40 MG tablet Take 1 tablet (40 mg total) by mouth 2 (two) times daily.    gabapentin (NEURONTIN) 100 MG capsule Take 1 capsule (100 mg total) by mouth 3 (three) times daily.    hydrALAZINE (APRESOLINE) 100 MG tablet Take 1 tablet (100 mg total) by mouth every 8 (eight) hours.    HYDROcodone-acetaminophen (NORCO) 5-325 mg per tablet Take 1 tablet by mouth every 6 (six) hours as needed for Pain.    insulin aspart U-100 (NOVOLOG) 100 unit/mL (3 mL) InPn pen Inject 1-10 Units into the skin before meals and at bedtime as needed (Hyperglycemia).    isosorbide mononitrate (IMDUR) 60 MG 24 hr tablet Take 2 tablets (120 mg total) by mouth once daily.    LANTUS U-100 INSULIN 100 unit/mL injection Inject 5 Units into the skin once daily.    levETIRAcetam (KEPPRA) 500 MG Tab Take 1 tablet (500 mg total) by mouth 2 (two) times daily.    ondansetron (ZOFRAN-ODT) 4 MG TbDL Dissolve 1 tablet (4 mg total) by mouth every 8 (eight) hours as needed (nausea, vomiting).    promethazine (PHENERGAN) 25 MG tablet Take 1 tablet (25 mg total) by mouth every 6 (six) hours as needed for Nausea.    [DISCONTINUED] atenoloL (TENORMIN) 50 MG tablet Take 1 tablet (50 mg total) by mouth every other day.    [DISCONTINUED] omeprazole (PRILOSEC) 20 MG capsule Take 2 capsules (40 mg total) by mouth once daily. for 10 days    [DISCONTINUED] torsemide (DEMADEX) 20 MG Tab Take 1 tablet (20 mg total) by mouth 2 (two) times a day. (Patient taking differently: Take 20  mg by mouth once daily.)     Family History       Problem Relation (Age of Onset)    Diabetes Mother, Father          Tobacco Use    Smoking status: Never    Smokeless tobacco: Never   Substance and Sexual Activity    Alcohol use: Not Currently    Drug use: No    Sexual activity: Not Currently     Partners: Male     Birth control/protection: I.U.D.     Review of Systems   Constitutional:  Positive for fatigue. Negative for fever.   HENT:  Negative for congestion, sore throat and trouble swallowing.    Eyes:  Positive for visual disturbance.   Respiratory:  Positive for cough (dry). Negative for shortness of breath and wheezing.    Cardiovascular:  Positive for chest pain. Negative for palpitations and leg swelling.   Gastrointestinal:  Positive for abdominal pain, nausea and vomiting. Negative for blood in stool, constipation and diarrhea.   Genitourinary:  Negative for dysuria.   Musculoskeletal:  Positive for arthralgias and myalgias.   Skin:  Negative for rash and wound.   Neurological:  Positive for dizziness, light-headedness and headaches.   Psychiatric/Behavioral:  Negative for agitation.      Objective:     Vital Signs (Most Recent):  Temp: 98 °F (36.7 °C) (11/11/22 1532)  Pulse: 91 (11/11/22 1632)  Resp: 19 (11/11/22 1532)  BP: (!) 180/114 (11/11/22 1632)  SpO2: 98 % (11/11/22 1632)   Vital Signs (24h Range):  Temp:  [98 °F (36.7 °C)-98.1 °F (36.7 °C)] 98 °F (36.7 °C)  Pulse:  [] 91  Resp:  [18-25] 19  SpO2:  [96 %-100 %] 98 %  BP: (165-209)/() 180/114        Body mass index is 24.14 kg/m².    Physical Exam  Vitals and nursing note reviewed.   Constitutional:       General: She is not in acute distress.     Appearance: She is not toxic-appearing or diaphoretic. Ill appearance: Chronically.     Comments: Sleepy, arouses to voice   HENT:      Head: Normocephalic and atraumatic.      Right Ear: External ear normal.      Left Ear: External ear normal.      Nose: Nose normal.      Mouth/Throat:       Mouth: Mucous membranes are dry.      Pharynx: Oropharynx is clear.   Eyes:      General: No scleral icterus.        Right eye: No discharge.         Left eye: No discharge.      Extraocular Movements: Extraocular movements intact.      Pupils: Pupils are equal, round, and reactive to light.   Cardiovascular:      Rate and Rhythm: Normal rate and regular rhythm.      Pulses: Normal pulses.      Heart sounds: Normal heart sounds.   Pulmonary:      Effort: Pulmonary effort is normal.      Breath sounds: Normal breath sounds.   Abdominal:      General: Bowel sounds are normal.      Tenderness: There is abdominal tenderness (Generalized). There is no guarding or rebound.   Musculoskeletal:      Cervical back: Normal range of motion and neck supple.      Right lower leg: No edema.      Left lower leg: No edema.   Skin:     General: Skin is warm and dry.      Capillary Refill: Capillary refill takes less than 2 seconds.   Neurological:      General: No focal deficit present.      Mental Status: She is oriented to person, place, and time.   Psychiatric:         Speech: Speech is delayed.         Behavior: Behavior is slowed.         CRANIAL NERVES     CN III, IV, VI   Pupils are equal, round, and reactive to light.     Significant Labs: All pertinent labs within the past 24 hours have been reviewed.    Recent Results (from the past 24 hour(s))   POCT glucose    Collection Time: 11/11/22 12:39 PM   Result Value Ref Range    POCT Glucose 82 70 - 110 mg/dL   CBC auto differential    Collection Time: 11/11/22  2:02 PM   Result Value Ref Range    WBC 6.41 3.90 - 12.70 K/uL    RBC 3.97 (L) 4.00 - 5.40 M/uL    Hemoglobin 11.3 (L) 12.0 - 16.0 g/dL    Hematocrit 32.7 (L) 37.0 - 48.5 %    MCV 82 82 - 98 fL    MCH 28.5 27.0 - 31.0 pg    MCHC 34.6 32.0 - 36.0 g/dL    RDW 15.8 (H) 11.5 - 14.5 %    Platelets 148 (L) 150 - 450 K/uL    MPV 9.4 9.2 - 12.9 fL    Immature Granulocytes 0.6 (H) 0.0 - 0.5 %    Gran # (ANC) 4.7 1.8 - 7.7  K/uL    Immature Grans (Abs) 0.04 0.00 - 0.04 K/uL    Lymph # 0.9 (L) 1.0 - 4.8 K/uL    Mono # 0.7 0.3 - 1.0 K/uL    Eos # 0.1 0.0 - 0.5 K/uL    Baso # 0.04 0.00 - 0.20 K/uL    nRBC 0 0 /100 WBC    Gran % 73.5 (H) 38.0 - 73.0 %    Lymph % 13.6 (L) 18.0 - 48.0 %    Mono % 10.6 4.0 - 15.0 %    Eosinophil % 1.1 0.0 - 8.0 %    Basophil % 0.6 0.0 - 1.9 %    Differential Method Automated    Comprehensive metabolic panel    Collection Time: 11/11/22  2:02 PM   Result Value Ref Range    Sodium 134 (L) 136 - 145 mmol/L    Potassium 4.1 3.5 - 5.1 mmol/L    Chloride 98 95 - 110 mmol/L    CO2 23 23 - 29 mmol/L    Glucose 65 (L) 70 - 110 mg/dL    BUN 28 (H) 6 - 20 mg/dL    Creatinine 4.0 (H) 0.5 - 1.4 mg/dL    Calcium 9.6 8.7 - 10.5 mg/dL    Total Protein 6.7 6.0 - 8.4 g/dL    Albumin 3.0 (L) 3.5 - 5.2 g/dL    Total Bilirubin 1.5 (H) 0.1 - 1.0 mg/dL    Alkaline Phosphatase 263 (H) 55 - 135 U/L    AST 33 10 - 40 U/L    ALT 58 (H) 10 - 44 U/L    Anion Gap 13 8 - 16 mmol/L    eGFR 14.5 (A) >60 mL/min/1.73 m^2   Lactic acid, plasma    Collection Time: 11/11/22  2:02 PM   Result Value Ref Range    Lactate (Lactic Acid) 0.9 0.5 - 2.2 mmol/L   Troponin I    Collection Time: 11/11/22  2:02 PM   Result Value Ref Range    Troponin I 0.039 (H) 0.000 - 0.026 ng/mL   B-Type natriuretic peptide (BNP)    Collection Time: 11/11/22  2:02 PM   Result Value Ref Range    BNP 3,578 (H) 0 - 99 pg/mL         Significant Imaging: I have reviewed all pertinent imaging results/findings within the past 24 hours.    Imaging Results              X-Ray Chest AP Portable (Final result)  Result time 11/11/22 14:41:14      Final result by Darius Esquivel MD (11/11/22 14:41:14)                   Impression:      Stable position of large-bore right jugular catheter and enlargement of cardiopericardial silhouette with no active pulmonary findings and no detrimental changes compared to earlier exam      Electronically signed by: Darius  "Sierra  Date:    11/11/2022  Time:    14:41               Narrative:    EXAMINATION:  XR CHEST AP PORTABLE    CLINICAL HISTORY:  Shortness of breath;    TECHNIQUE:  Single frontal view of the chest was performed.    COMPARISON:  November 6, 2022    FINDINGS:  Reconfirmed large-bore right jugular catheter, tip superimposing right atrial soft tissues as before.  Stable enlargement of cardiopericardial silhouette.  Normal pulmonary vasculature.  No focal infiltrates.  No pleural effusions or pneumothorax.  No acute bony findings.                                        Assessment/Plan:     * Gastroparesis  Abdominal pain   Epigastric pain  Nausea and vomiting    Multiple admissions for similar symptomology per chart review.  Further, prior hospital medicine notes comment on patient distractibility on examined concern for pain seeking behavior.  As per a prior admission, MD note with "She has very dramatic behavior, likes only IV Dilaudid"  Patient afebrile, hypertensive upon admission, on room air.  Labs stable to improved compared to prior admission; do not indicate acute intra-abdominal pathology.  Lactic acid negative.  Troponin improved from prior.  BNP improved from prior.  EKG without changes suggesting ischemia.    Initiate erythromycin for management of suspected gastroparesis; Reglan contraindicated due to seizure history  Supportive care with IV fluids as needed (mindful volume status), antiemetics prn  Avoid opioids; discussed with patient  Multimodal pain control with Tylenol, gabapentin, Toradol (will be mindful of chronic thrombocytopenia which is stable), Voltaren gel, lidocaine patch  Lipase   CT chest, abdomen, pelvis for completeness, though noted that prior studies largely unremarkable (urine pregnancy test)  Advance diet as tolerated  QTC monitoring    Anemia, chronic disease  Chronic, stable         Hypertension  Uncontrolled upon admission in the setting of nausea, vomiting, pain    Continue home " amlodipine, metoprolol, hydralazine, Imdur  Will continue to monitor blood pressure trend closely and adjust antihypertensive regimen as clinically indicated and tolerated.      ESRD (end stage renal disease)  Nephrology consulted for HD, appreciate assistance      Seizure  History of seizures, Continue home Keppra      Type 2 diabetes mellitus with chronic kidney disease on chronic dialysis, with long-term current use of insulin  Patient's FSGs are controlled on current medication regimen.  Last A1c reviewed-   Lab Results   Component Value Date    HGBA1C 6.2 08/24/2022     Most recent fingerstick glucose reviewed-   Recent Labs   Lab 11/11/22  1239   POCTGLUCOSE 82     Current correctional scale  Low  Maintain anti-hyperglycemic dose as follows-   Antihyperglycemics (From admission, onward)    Start     Stop Route Frequency Ordered    11/12/22 0900  insulin detemir U-100 pen 5 Units         -- SubQ Daily 11/11/22 1748 11/11/22 1912  insulin aspart U-100 pen 0-5 Units         -- SubQ Before meals & nightly PRN 11/11/22 1812        Hold Oral hypoglycemics while patient is in the hospital.      VTE Risk Mitigation (From admission, onward)         Ordered     apixaban tablet 5 mg  2 times daily         11/11/22 1748     IP VTE HIGH RISK PATIENT  Once         11/11/22 1748     Place sequential compression device  Until discontinued         11/11/22 1748                   Ilana Tillman MD  Department of Hospital Medicine   [facility] - Emergency Dept

## 2022-11-12 NOTE — NURSING
Patient admitted to room, upon arrival BP noted to be >200 systolic and >100 diastolic. Manual taken 190/110, prn labetelol admin, dr. Whelan paged and night nurse and charge nurse aware. Patient having pain 10/10 to back and abdomen. Tylenol and lidocaine patch admin. Awaiting prn toradol from pharmacy.

## 2022-11-12 NOTE — SUBJECTIVE & OBJECTIVE
Interval History:   Patient seen and examined in HD suite.  Resting comfortably.  No acute events overnight; had some generalized abdominal and chest pains, this was improved with buprenorphine.  Patient has no acute complaints this morning.  She notes improvement of chest tightness and epigastric pain, generalized abdominal pain, nausea and vomiting.  She completed the entirety of her breakfast without any issues.  Notes some difficulty with transportation to HD center in addition to poor insight regarding dietary modifications for her condition.    Review of Systems   Constitutional:  Positive for fatigue. Negative for fever.   HENT:  Negative for congestion, sore throat and trouble swallowing.    Eyes:  Positive for visual disturbance.   Respiratory:  Negative for cough, shortness of breath and wheezing.    Cardiovascular:  Negative for chest pain, palpitations and leg swelling.   Gastrointestinal:  Negative for abdominal pain, blood in stool, constipation, diarrhea, nausea and vomiting.   Genitourinary:  Negative for dysuria.   Musculoskeletal:  Negative for myalgias.   Skin:  Negative for rash and wound.   Neurological:  Negative for dizziness, light-headedness and headaches.   Psychiatric/Behavioral:  Negative for agitation.      Objective:     Vital Signs (Most Recent):  Temp: 98.1 °F (36.7 °C) (11/12/22 1232)  Pulse: 81 (11/12/22 1232)  Resp: 16 (11/12/22 0713)  BP: (!) 147/101 (11/12/22 1232)  SpO2: (!) 94 % (11/12/22 0713)   Vital Signs (24h Range):  Temp:  [97.6 °F (36.4 °C)-98.4 °F (36.9 °C)] 98.1 °F (36.7 °C)  Pulse:  [] 81  Resp:  [16-22] 16  SpO2:  [94 %-100 %] 94 %  BP: (116-208)/() 147/101     Weight: 59.9 kg (132 lb 0.9 oz)  Body mass index is 24.15 kg/m².  No intake or output data in the 24 hours ending 11/12/22 1438   Physical Exam  Vitals and nursing note reviewed.   Constitutional:       General: She is not in acute distress.     Appearance: She is ill-appearing (Chronically). She  is not toxic-appearing or diaphoretic.   HENT:      Head: Normocephalic and atraumatic.      Right Ear: External ear normal.      Left Ear: External ear normal.      Nose: Nose normal.      Mouth/Throat:      Mouth: Mucous membranes are dry.      Pharynx: Oropharynx is clear.   Eyes:      General: No scleral icterus.        Right eye: No discharge.         Left eye: No discharge.      Extraocular Movements: Extraocular movements intact.      Pupils: Pupils are equal, round, and reactive to light.   Cardiovascular:      Rate and Rhythm: Normal rate and regular rhythm.      Pulses: Normal pulses.      Heart sounds: Normal heart sounds.   Pulmonary:      Effort: Pulmonary effort is normal.      Breath sounds: Normal breath sounds.   Abdominal:      General: Bowel sounds are normal.      Tenderness: There is no abdominal tenderness. There is no guarding or rebound.   Musculoskeletal:      Cervical back: Normal range of motion and neck supple.      Right lower leg: No edema.      Left lower leg: No edema.   Skin:     General: Skin is warm and dry.      Capillary Refill: Capillary refill takes less than 2 seconds.   Neurological:      General: No focal deficit present.      Mental Status: She is alert and oriented to person, place, and time.   Psychiatric:         Speech: Speech normal.         Behavior: Behavior is cooperative.       Significant Labs: All pertinent labs within the past 24 hours have been reviewed.    Recent Results (from the past 24 hour(s))   POCT glucose    Collection Time: 11/11/22  7:43 PM   Result Value Ref Range    POCT Glucose 129 (H) 70 - 110 mg/dL   Lipase    Collection Time: 11/11/22  7:50 PM   Result Value Ref Range    Lipase 17 4 - 60 U/L   CBC Auto Differential    Collection Time: 11/12/22  4:48 AM   Result Value Ref Range    WBC 5.01 3.90 - 12.70 K/uL    RBC 3.73 (L) 4.00 - 5.40 M/uL    Hemoglobin 10.6 (L) 12.0 - 16.0 g/dL    Hematocrit 31.8 (L) 37.0 - 48.5 %    MCV 85 82 - 98 fL    MCH  28.4 27.0 - 31.0 pg    MCHC 33.3 32.0 - 36.0 g/dL    RDW 16.1 (H) 11.5 - 14.5 %    Platelets 145 (L) 150 - 450 K/uL    MPV 9.4 9.2 - 12.9 fL    Immature Granulocytes 0.4 0.0 - 0.5 %    Gran # (ANC) 3.6 1.8 - 7.7 K/uL    Immature Grans (Abs) 0.02 0.00 - 0.04 K/uL    Lymph # 0.9 (L) 1.0 - 4.8 K/uL    Mono # 0.4 0.3 - 1.0 K/uL    Eos # 0.1 0.0 - 0.5 K/uL    Baso # 0.02 0.00 - 0.20 K/uL    nRBC 0 0 /100 WBC    Gran % 71.6 38.0 - 73.0 %    Lymph % 18.4 18.0 - 48.0 %    Mono % 8.2 4.0 - 15.0 %    Eosinophil % 1.0 0.0 - 8.0 %    Basophil % 0.4 0.0 - 1.9 %    Differential Method Automated    Comprehensive Metabolic Panel    Collection Time: 11/12/22  4:48 AM   Result Value Ref Range    Sodium 135 (L) 136 - 145 mmol/L    Potassium 5.0 3.5 - 5.1 mmol/L    Chloride 100 95 - 110 mmol/L    CO2 23 23 - 29 mmol/L    Glucose 191 (H) 70 - 110 mg/dL    BUN 34 (H) 6 - 20 mg/dL    Creatinine 4.7 (H) 0.5 - 1.4 mg/dL    Calcium 8.5 (L) 8.7 - 10.5 mg/dL    Total Protein 5.4 (L) 6.0 - 8.4 g/dL    Albumin 2.3 (L) 3.5 - 5.2 g/dL    Total Bilirubin 1.1 (H) 0.1 - 1.0 mg/dL    Alkaline Phosphatase 207 (H) 55 - 135 U/L    AST 25 10 - 40 U/L    ALT 40 10 - 44 U/L    Anion Gap 12 8 - 16 mmol/L    eGFR 11.9 (A) >60 mL/min/1.73 m^2   Magnesium    Collection Time: 11/12/22  4:48 AM   Result Value Ref Range    Magnesium 1.9 1.6 - 2.6 mg/dL   Phosphorus    Collection Time: 11/12/22  4:48 AM   Result Value Ref Range    Phosphorus 5.4 (H) 2.7 - 4.5 mg/dL   POCT glucose    Collection Time: 11/12/22  7:16 AM   Result Value Ref Range    POCT Glucose 211 (H) 70 - 110 mg/dL   POCT glucose    Collection Time: 11/12/22 12:21 PM   Result Value Ref Range    POCT Glucose 129 (H) 70 - 110 mg/dL         Significant Imaging: I have reviewed all pertinent imaging results/findings within the past 24 hours.    Imaging Results               CT Chest Abdomen Pelvis Without Contrast (XPD) (Final result)  Result time 11/11/22 18:38:42      Final result by Max Mancia,  MD (11/11/22 18:38:42)                   Impression:      This report was flagged in Epic as abnormal.    1. In comparison to examination 10/29/2022, pericardial effusion has worsened as well as worsening body wall anasarca and fluid in the pelvis, findings are concerning for volume overload, correlation is advised.  2. Patchy ground-glass and consolidation throughout the pulmonary parenchyma particularly involving the bilateral lower lobes findings are concerning for edema and superimposed developing infectious process.  Correlation is advised.  3. There is edema throughout the gastric walls, new since the previous exam.  This may be on the basis of volume overload or gastritis.  Correlation recommended.  4. Urinary bladder is mildly distended noting diffuse wall thickening, correlation with urinalysis advised to exclude cystitis.  The differential would potentially include sequela of recently passed calculus however infection is of concern.  Correlation advised.  5. Please see above for several additional findings.      Electronically signed by: Max Mancia MD  Date:    11/11/2022  Time:    18:38               Narrative:    EXAMINATION:  CT CHEST ABDOMEN PELVIS WITHOUT CONTRAST(XPD)    CLINICAL HISTORY:  n/v, abdo pain, cp;    TECHNIQUE:  Low dose axial images, sagittal and coronal reformations were obtained from the thoracic inlet to the pubic symphysis .  Oral contrast was not administered.    COMPARISON:  CTA chest 10/29/2022, CT abdomen and pelvis 10/26/2022    FINDINGS:  The structures at the base of the neck are remarkable for bilateral low attenuating thyroid nodules measuring up to 1.6 cm within the inferior aspects of the thyroid.  Nonemergent ultrasound could be performed for further evaluation as warranted.  There is a moderate pericardial effusion worsened since the previous examination.  Right central venous catheter tip terminates at the level of the distal SVC/right atrial junction.  The heart  is not enlarged.  The thoracic aorta tapers normally noting atherosclerotic calcification along its course.    The airways are patent.  There is patchy ground-glass attenuation throughout the pulmonary parenchyma bilaterally noting scattered regions of patchy consolidation within the bilateral lower lobes, left greater than right, also along the fissure on the left.  No pneumothorax.  No pleural effusion.  There is bilateral basilar dependent atelectasis.    Allowing for motion artifact, the liver is somewhat high attenuating, correlation with any history of iron overload or amiodarone use.  The spleen is enlarged.  The adrenal glands and pancreas are grossly unremarkable.  The stomach is decompressed noting there is edema throughout the gastric walls, correlation with any history of gastritis.  The gallbladder is surgically absent.  No significant biliary dilation.  No significant abdominal lymphadenopathy.    There is no right hydronephrosis or right nephrolithiasis noting right renal vascular calcification.  The right ureter is unable to be followed in its entirety to the urinary bladder, no definite calculi seen.  There is left perinephric fat stranding.  There is mild-to-moderate left hydronephrosis.  The left ureter is prominent.  No definite calculi seen along the course of the left ureter.  The urinary bladder is distended noting wall thickening.  The uterus and adnexa are grossly unremarkable.  There is fluid in the pelvis.    There are a few scattered colonic diverticula without inflammation.  There is moderate stool in the colon.  The terminal ileum is unremarkable.  The appendix is unremarkable.  The small bowel is grossly unremarkable.  There is strand-like fluid in the abdomen and pelvis.  No focal organized pelvic fluid collection.    No acute osseous abnormality.  No significant inguinal lymphadenopathy.  No significant axillary lymphadenopathy.  There is diffuse body wall anasarca.                                        X-Ray Chest AP Portable (Final result)  Result time 11/11/22 14:41:14      Final result by Darius Esquivel MD (11/11/22 14:41:14)                   Impression:      Stable position of large-bore right jugular catheter and enlargement of cardiopericardial silhouette with no active pulmonary findings and no detrimental changes compared to earlier exam      Electronically signed by: Darius Esquivel  Date:    11/11/2022  Time:    14:41               Narrative:    EXAMINATION:  XR CHEST AP PORTABLE    CLINICAL HISTORY:  Shortness of breath;    TECHNIQUE:  Single frontal view of the chest was performed.    COMPARISON:  November 6, 2022    FINDINGS:  Reconfirmed large-bore right jugular catheter, tip superimposing right atrial soft tissues as before.  Stable enlargement of cardiopericardial silhouette.  Normal pulmonary vasculature.  No focal infiltrates.  No pleural effusions or pneumothorax.  No acute bony findings.

## 2022-11-12 NOTE — ASSESSMENT & PLAN NOTE
Uncontrolled upon admission in the setting of nausea, vomiting, pain    Continue home amlodipine, metoprolol, hydralazine, Imdur  Will continue to monitor blood pressure trend closely and adjust antihypertensive regimen as clinically indicated and tolerated.

## 2022-11-12 NOTE — ASSESSMENT & PLAN NOTE
"Abdominal pain   Epigastric pain  Nausea and vomiting  Gastroparesis    Multiple admissions for similar symptomology per chart review.  Further, prior hospital medicine notes comment on patient distractibility on examined concern for pain seeking behavior.  As per a prior admission, MD note with "She has very dramatic behavior, likes only IV Dilaudid"  Patient afebrile, hypertensive upon admission, on room air.  Labs stable to improved compared to prior admission; do not indicate acute intra-abdominal pathology.  Lactic acid negative.  Troponin improved from prior.  BNP improved from prior.  EKG without changes suggesting ischemia.  Lipase within normal limits.  CT chest abdomen pelvis, as per hospital course, notable for findings concerning for volume overload; likely etiology of her symptoms.    Initiate erythromycin for management of suspected gastroparesis; Reglan contraindicated due to seizure history  Supportive care with antiemetics prn  Avoid opioids; discussed with patient  Multimodal pain control with Tylenol, gabapentin, Toradol (will be mindful of chronic thrombocytopenia which is stable), Voltaren gel, lidocaine patch, buprenorphine  Advance diet as tolerated  QTC monitoring  HD for volume removal; improving with volume optimization  "

## 2022-11-12 NOTE — PLAN OF CARE
Problem: Hemodynamic Instability (Hemodialysis)  Goal: Effective Tissue Perfusion  Outcome: Ongoing, Progressing     Dialysis tx ended to the right IJ dialysis cath, heparin locked, capped, sterile dressing applied.    Net fluid removed: 3L.    Report given to nurse Netta pt transported by bed from [facility] to room 38056.

## 2022-11-12 NOTE — ASSESSMENT & PLAN NOTE
Nephrology consulted for HD, appreciate assistance  Will discuss with cm/sw, potentially transportation barriers to outpatient HD

## 2022-11-12 NOTE — PROGRESS NOTES
[facility] - [facility] ([address] Medicine  Progress Note    Patient Name: Tabby Howard  MRN: [id number]  Patient Class: OP- Observation   Admission Date: 11/11/2022  Length of Stay: 0 days  Attending Physician: Ilana Tillman MD  Primary Care Provider: Primary Doctor No        Subjective:     Principal Problem:Volume overload        HPI:  Tabby Howard is a 33 y/o F w/ hx of ESRD on HD, DM, HFpEF, GERD, suspected gastroparesis, sickle cell trait, HTN, with a recent episode of C diff who presents to the emergency department with complaints of generalized abdominal pain with nausea and vomiting, diffuse back pain, and mid-sternal chest pain that began at 6 in the morning.  Patient is poor historian; suspect due to grogginess following administration of IV Dilaudid in ED. she is unable to clearly engage in conversation regarding symptomology following recent discharge from hospital (admitted from 10/29-11/01).  Upon review of medical records, patient with multiple admissions for similar symptoms.  When asked specifically, she notes that emesis nonbloody, nonbilious; notes 3 episodes at home prior to presentation.  Describes abdominal pain as generalized ache.  In terms of her chest pain, she notes tightness which is epigastric.  She also endorses headache, lightheadedness, dizziness.  Her visual changes are (left eye haziness) stable.  Her last dialysis session was on Wednesday; she is unable to comment if she completed full session or if any volume removed.  She denies recent THC use.  She notes adherence to her medication regimen.      Overview/Hospital Course:  Lipase within normal limits.  For completeness, CT chest abdomen pelvis without contrast obtained.  In comparison to prior, notable for worsening pericardial effusion as well as worsening body wall anasarca and fluid in the pelvis concerning for volume overload.  Edema throughout the gastric walls was new since prior exam, likely due to  volume overload in setting of presentation.  Patient with improvement of symptoms following HD initiation.      Interval History:   Patient seen and examined in HD suite.  Resting comfortably.  No acute events overnight; had some generalized abdominal and chest pains, this was improved with buprenorphine.  Patient has no acute complaints this morning.  She notes improvement of chest tightness and epigastric pain, generalized abdominal pain, nausea and vomiting.  She completed the entirety of her breakfast without any issues.  Notes some difficulty with transportation to HD center in addition to poor insight regarding dietary modifications for her condition.    Review of Systems   Constitutional:  Positive for fatigue. Negative for fever.   HENT:  Negative for congestion, sore throat and trouble swallowing.    Eyes:  Positive for visual disturbance.   Respiratory:  Negative for cough, shortness of breath and wheezing.    Cardiovascular:  Negative for chest pain, palpitations and leg swelling.   Gastrointestinal:  Negative for abdominal pain, blood in stool, constipation, diarrhea, nausea and vomiting.   Genitourinary:  Negative for dysuria.   Musculoskeletal:  Negative for myalgias.   Skin:  Negative for rash and wound.   Neurological:  Negative for dizziness, light-headedness and headaches.   Psychiatric/Behavioral:  Negative for agitation.      Objective:     Vital Signs (Most Recent):  Temp: 98.1 °F (36.7 °C) (11/12/22 1232)  Pulse: 81 (11/12/22 1232)  Resp: 16 (11/12/22 0713)  BP: (!) 147/101 (11/12/22 1232)  SpO2: (!) 94 % (11/12/22 0713)   Vital Signs (24h Range):  Temp:  [97.6 °F (36.4 °C)-98.4 °F (36.9 °C)] 98.1 °F (36.7 °C)  Pulse:  [] 81  Resp:  [16-22] 16  SpO2:  [94 %-100 %] 94 %  BP: (116-208)/() 147/101     Weight: 59.9 kg (132 lb 0.9 oz)  Body mass index is 24.15 kg/m².  No intake or output data in the 24 hours ending 11/12/22 1438   Physical Exam  Vitals and nursing note reviewed.    Constitutional:       General: She is not in acute distress.     Appearance: She is ill-appearing (Chronically). She is not toxic-appearing or diaphoretic.   HENT:      Head: Normocephalic and atraumatic.      Right Ear: External ear normal.      Left Ear: External ear normal.      Nose: Nose normal.      Mouth/Throat:      Mouth: Mucous membranes are dry.      Pharynx: Oropharynx is clear.   Eyes:      General: No scleral icterus.        Right eye: No discharge.         Left eye: No discharge.      Extraocular Movements: Extraocular movements intact.      Pupils: Pupils are equal, round, and reactive to light.   Cardiovascular:      Rate and Rhythm: Normal rate and regular rhythm.      Pulses: Normal pulses.      Heart sounds: Normal heart sounds.   Pulmonary:      Effort: Pulmonary effort is normal.      Breath sounds: Normal breath sounds.   Abdominal:      General: Bowel sounds are normal.      Tenderness: There is no abdominal tenderness. There is no guarding or rebound.   Musculoskeletal:      Cervical back: Normal range of motion and neck supple.      Right lower leg: No edema.      Left lower leg: No edema.   Skin:     General: Skin is warm and dry.      Capillary Refill: Capillary refill takes less than 2 seconds.   Neurological:      General: No focal deficit present.      Mental Status: She is alert and oriented to person, place, and time.   Psychiatric:         Speech: Speech normal.         Behavior: Behavior is cooperative.       Significant Labs: All pertinent labs within the past 24 hours have been reviewed.    Recent Results (from the past 24 hour(s))   POCT glucose    Collection Time: 11/11/22  7:43 PM   Result Value Ref Range    POCT Glucose 129 (H) 70 - 110 mg/dL   Lipase    Collection Time: 11/11/22  7:50 PM   Result Value Ref Range    Lipase 17 4 - 60 U/L   CBC Auto Differential    Collection Time: 11/12/22  4:48 AM   Result Value Ref Range    WBC 5.01 3.90 - 12.70 K/uL    RBC 3.73 (L) 4.00 -  5.40 M/uL    Hemoglobin 10.6 (L) 12.0 - 16.0 g/dL    Hematocrit 31.8 (L) 37.0 - 48.5 %    MCV 85 82 - 98 fL    MCH 28.4 27.0 - 31.0 pg    MCHC 33.3 32.0 - 36.0 g/dL    RDW 16.1 (H) 11.5 - 14.5 %    Platelets 145 (L) 150 - 450 K/uL    MPV 9.4 9.2 - 12.9 fL    Immature Granulocytes 0.4 0.0 - 0.5 %    Gran # (ANC) 3.6 1.8 - 7.7 K/uL    Immature Grans (Abs) 0.02 0.00 - 0.04 K/uL    Lymph # 0.9 (L) 1.0 - 4.8 K/uL    Mono # 0.4 0.3 - 1.0 K/uL    Eos # 0.1 0.0 - 0.5 K/uL    Baso # 0.02 0.00 - 0.20 K/uL    nRBC 0 0 /100 WBC    Gran % 71.6 38.0 - 73.0 %    Lymph % 18.4 18.0 - 48.0 %    Mono % 8.2 4.0 - 15.0 %    Eosinophil % 1.0 0.0 - 8.0 %    Basophil % 0.4 0.0 - 1.9 %    Differential Method Automated    Comprehensive Metabolic Panel    Collection Time: 11/12/22  4:48 AM   Result Value Ref Range    Sodium 135 (L) 136 - 145 mmol/L    Potassium 5.0 3.5 - 5.1 mmol/L    Chloride 100 95 - 110 mmol/L    CO2 23 23 - 29 mmol/L    Glucose 191 (H) 70 - 110 mg/dL    BUN 34 (H) 6 - 20 mg/dL    Creatinine 4.7 (H) 0.5 - 1.4 mg/dL    Calcium 8.5 (L) 8.7 - 10.5 mg/dL    Total Protein 5.4 (L) 6.0 - 8.4 g/dL    Albumin 2.3 (L) 3.5 - 5.2 g/dL    Total Bilirubin 1.1 (H) 0.1 - 1.0 mg/dL    Alkaline Phosphatase 207 (H) 55 - 135 U/L    AST 25 10 - 40 U/L    ALT 40 10 - 44 U/L    Anion Gap 12 8 - 16 mmol/L    eGFR 11.9 (A) >60 mL/min/1.73 m^2   Magnesium    Collection Time: 11/12/22  4:48 AM   Result Value Ref Range    Magnesium 1.9 1.6 - 2.6 mg/dL   Phosphorus    Collection Time: 11/12/22  4:48 AM   Result Value Ref Range    Phosphorus 5.4 (H) 2.7 - 4.5 mg/dL   POCT glucose    Collection Time: 11/12/22  7:16 AM   Result Value Ref Range    POCT Glucose 211 (H) 70 - 110 mg/dL   POCT glucose    Collection Time: 11/12/22 12:21 PM   Result Value Ref Range    POCT Glucose 129 (H) 70 - 110 mg/dL         Significant Imaging: I have reviewed all pertinent imaging results/findings within the past 24 hours.    Imaging Results               CT Chest Abdomen  Pelvis Without Contrast (XPD) (Final result)  Result time 11/11/22 18:38:42      Final result by Max Mancia MD (11/11/22 18:38:42)                   Impression:      This report was flagged in Epic as abnormal.    1. In comparison to examination 10/29/2022, pericardial effusion has worsened as well as worsening body wall anasarca and fluid in the pelvis, findings are concerning for volume overload, correlation is advised.  2. Patchy ground-glass and consolidation throughout the pulmonary parenchyma particularly involving the bilateral lower lobes findings are concerning for edema and superimposed developing infectious process.  Correlation is advised.  3. There is edema throughout the gastric walls, new since the previous exam.  This may be on the basis of volume overload or gastritis.  Correlation recommended.  4. Urinary bladder is mildly distended noting diffuse wall thickening, correlation with urinalysis advised to exclude cystitis.  The differential would potentially include sequela of recently passed calculus however infection is of concern.  Correlation advised.  5. Please see above for several additional findings.      Electronically signed by: Max Mancia MD  Date:    11/11/2022  Time:    18:38               Narrative:    EXAMINATION:  CT CHEST ABDOMEN PELVIS WITHOUT CONTRAST(XPD)    CLINICAL HISTORY:  n/v, abdo pain, cp;    TECHNIQUE:  Low dose axial images, sagittal and coronal reformations were obtained from the thoracic inlet to the pubic symphysis .  Oral contrast was not administered.    COMPARISON:  CTA chest 10/29/2022, CT abdomen and pelvis 10/26/2022    FINDINGS:  The structures at the base of the neck are remarkable for bilateral low attenuating thyroid nodules measuring up to 1.6 cm within the inferior aspects of the thyroid.  Nonemergent ultrasound could be performed for further evaluation as warranted.  There is a moderate pericardial effusion worsened since the previous  examination.  Right central venous catheter tip terminates at the level of the distal SVC/right atrial junction.  The heart is not enlarged.  The thoracic aorta tapers normally noting atherosclerotic calcification along its course.    The airways are patent.  There is patchy ground-glass attenuation throughout the pulmonary parenchyma bilaterally noting scattered regions of patchy consolidation within the bilateral lower lobes, left greater than right, also along the fissure on the left.  No pneumothorax.  No pleural effusion.  There is bilateral basilar dependent atelectasis.    Allowing for motion artifact, the liver is somewhat high attenuating, correlation with any history of iron overload or amiodarone use.  The spleen is enlarged.  The adrenal glands and pancreas are grossly unremarkable.  The stomach is decompressed noting there is edema throughout the gastric walls, correlation with any history of gastritis.  The gallbladder is surgically absent.  No significant biliary dilation.  No significant abdominal lymphadenopathy.    There is no right hydronephrosis or right nephrolithiasis noting right renal vascular calcification.  The right ureter is unable to be followed in its entirety to the urinary bladder, no definite calculi seen.  There is left perinephric fat stranding.  There is mild-to-moderate left hydronephrosis.  The left ureter is prominent.  No definite calculi seen along the course of the left ureter.  The urinary bladder is distended noting wall thickening.  The uterus and adnexa are grossly unremarkable.  There is fluid in the pelvis.    There are a few scattered colonic diverticula without inflammation.  There is moderate stool in the colon.  The terminal ileum is unremarkable.  The appendix is unremarkable.  The small bowel is grossly unremarkable.  There is strand-like fluid in the abdomen and pelvis.  No focal organized pelvic fluid collection.    No acute osseous abnormality.  No significant  "inguinal lymphadenopathy.  No significant axillary lymphadenopathy.  There is diffuse body wall anasarca.                                       X-Ray Chest AP Portable (Final result)  Result time 11/11/22 14:41:14      Final result by Darius Esquivel MD (11/11/22 14:41:14)                   Impression:      Stable position of large-bore right jugular catheter and enlargement of cardiopericardial silhouette with no active pulmonary findings and no detrimental changes compared to earlier exam      Electronically signed by: Darius Esquivel  Date:    11/11/2022  Time:    14:41               Narrative:    EXAMINATION:  XR CHEST AP PORTABLE    CLINICAL HISTORY:  Shortness of breath;    TECHNIQUE:  Single frontal view of the chest was performed.    COMPARISON:  November 6, 2022    FINDINGS:  Reconfirmed large-bore right jugular catheter, tip superimposing right atrial soft tissues as before.  Stable enlargement of cardiopericardial silhouette.  Normal pulmonary vasculature.  No focal infiltrates.  No pleural effusions or pneumothorax.  No acute bony findings.                                          Assessment/Plan:      * Volume overload  Abdominal pain   Epigastric pain  Nausea and vomiting  Gastroparesis    Multiple admissions for similar symptomology per chart review.  Further, prior hospital medicine notes comment on patient distractibility on examined concern for pain seeking behavior.  As per a prior admission, MD note with "She has very dramatic behavior, likes only IV Dilaudid"  Patient afebrile, hypertensive upon admission, on room air.  Labs stable to improved compared to prior admission; do not indicate acute intra-abdominal pathology.  Lactic acid negative.  Troponin improved from prior.  BNP improved from prior.  EKG without changes suggesting ischemia.  Lipase within normal limits.  CT chest abdomen pelvis, as per hospital course, notable for findings concerning for volume overload; likely etiology of her " "symptoms.    Initiate erythromycin for management of suspected gastroparesis; Reglan contraindicated due to seizure history  Supportive care with antiemetics prn  Avoid opioids; discussed with patient  Multimodal pain control with Tylenol, gabapentin, Toradol (will be mindful of chronic thrombocytopenia which is stable), Voltaren gel, lidocaine patch, buprenorphine  Advance diet as tolerated  QTC monitoring  HD for volume removal; improving with volume optimization    Anemia, chronic disease  Chronic, stable         Hypertension  Uncontrolled upon admission in the setting of nausea, vomiting, pain    Continue home amlodipine, metoprolol, hydralazine, Imdur  Will continue to monitor blood pressure trend closely and adjust antihypertensive regimen as clinically indicated and tolerated.      ESRD (end stage renal disease)  Nephrology consulted for HD, appreciate assistance  Will discuss with cm/sw, potentially transportation barriers to outpatient HD      Seizure  History of seizures, Continue home Keppra      Gastroparesis  Abdominal pain   Epigastric pain  Nausea and vomiting    Multiple admissions for similar symptomology per chart review.  Further, prior hospital medicine notes comment on patient distractibility on examined concern for pain seeking behavior.  As per a prior admission, MD note with "She has very dramatic behavior, likes only IV Dilaudid"  Patient afebrile, hypertensive upon admission, on room air.  Labs stable to improved compared to prior admission; do not indicate acute intra-abdominal pathology.  Lactic acid negative.  Troponin improved from prior.  BNP improved from prior.  EKG without changes suggesting ischemia.    Initiate erythromycin for management of suspected gastroparesis; Reglan contraindicated due to seizure history  Supportive care with IV fluids as needed (mindful volume status), antiemetics prn  Avoid opioids; discussed with patient  Multimodal pain control with Tylenol, gabapentin, " Toradol (will be mindful of chronic thrombocytopenia which is stable), Voltaren gel, lidocaine patch  Lipase   CT chest, abdomen, pelvis for completeness, though noted that prior studies largely unremarkable (urine pregnancy test)  Advance diet as tolerated  QTC monitoring    Type 2 diabetes mellitus with chronic kidney disease on chronic dialysis, with long-term current use of insulin  Patient's FSGs are controlled on current medication regimen.  Last A1c reviewed-   Lab Results   Component Value Date    HGBA1C 6.2 08/24/2022     Most recent fingerstick glucose reviewed-   Recent Labs   Lab 11/11/22  1943 11/12/22  0716 11/12/22  1221   POCTGLUCOSE 129* 211* 129*     Current correctional scale  Low  Maintain anti-hyperglycemic dose as follows-   Antihyperglycemics (From admission, onward)    Start     Stop Route Frequency Ordered    11/12/22 0900  insulin detemir U-100 pen 5 Units         -- SubQ Daily 11/11/22 1748    11/11/22 1912  insulin aspart U-100 pen 0-5 Units         -- SubQ Before meals & nightly PRN 11/11/22 1812        Hold Oral hypoglycemics while patient is in the hospital.      VTE Risk Mitigation (From admission, onward)         Ordered     heparin (porcine) injection 1,000 Units  As needed (PRN)         11/12/22 1009     apixaban tablet 5 mg  2 times daily         11/11/22 1748     IP VTE HIGH RISK PATIENT  Once         11/11/22 1748     Place sequential compression device  Until discontinued         11/11/22 1748                Discharge Planning   TATIANA: 11/13/2022     Code Status: Full Code   Is the patient medically ready for discharge?: No    Reason for patient still in hospital (select all that apply): Patient trending condition, Treatment and Consult recommendations                     Ilana Tillman MD  Department of Hospital Medicine   [facility] - Intensive Care ([address] [id number])

## 2022-11-12 NOTE — HPI
The patient is a 32 y.o. Black or  Female with multiple co morbidities including ESRD on HD, DM, HFpEF, GERD, suspected gastroparesis, sickle cell trait, HTN, with a recent episode of C diff who presents to the emergency department with complaints of generalized abdominal pain with nausea and vomiting, diffuse back pain, and mid-sternal chest pain. Upon review of medical records, patient with multiple admissions for similar symptoms. Her last dialysis session was on Wednesday, but missed HD on Friday due to symptoms. Patient hypertensive on arrival. Electrolytes with evidence of ESRD. Lactic acid negative.  Troponin improved from prior.  BNP improved from prior.  EKG without changes suggesting ischemia. Nephrology consulted for management of ESRD and HD treatment.

## 2022-11-12 NOTE — ASSESSMENT & PLAN NOTE
Patient's FSGs are controlled on current medication regimen.  Last A1c reviewed-   Lab Results   Component Value Date    HGBA1C 6.2 08/24/2022     Most recent fingerstick glucose reviewed-   Recent Labs   Lab 11/11/22  1943 11/12/22  0716 11/12/22  1221   POCTGLUCOSE 129* 211* 129*     Current correctional scale  Low  Maintain anti-hyperglycemic dose as follows-   Antihyperglycemics (From admission, onward)    Start     Stop Route Frequency Ordered    11/12/22 0900  insulin detemir U-100 pen 5 Units         -- SubQ Daily 11/11/22 1748    11/11/22 1912  insulin aspart U-100 pen 0-5 Units         -- SubQ Before meals & nightly PRN 11/11/22 1812        Hold Oral hypoglycemics while patient is in the hospital.

## 2022-11-12 NOTE — CONSULTS
[facility] ([place] [address])  Nephrology  Consult Note    Patient Name: Tabby Howard  MRN: [id number]  Admission Date: 2022  Hospital Length of Stay: 0 days  Attending Provider: Ilana Tillman MD   Primary Care Physician: Primary Doctor No  Principal Problem:Gastroparesis    Inpatient consult to Nephrology  Consult performed by: Goldie Zelaya DNP, FNP-C  Consult ordered by: Ilana Tillman MD  Reason for consult: ESRD        Subjective:     HPI: The patient is a 32 y.o. Black or  Female with multiple co morbidities including ESRD on HD, DM, HFpEF, GERD, suspected gastroparesis, sickle cell trait, HTN, with a recent episode of C diff who presents to the emergency department with complaints of generalized abdominal pain with nausea and vomiting, diffuse back pain, and mid-sternal chest pain. Upon review of medical records, patient with multiple admissions for similar symptoms. Her last dialysis session was on Wednesday, but missed HD on Friday due to symptoms. Patient hypertensive on arrival. Electrolytes with evidence of ESRD. Lactic acid negative.  Troponin improved from prior.  BNP improved from prior.  EKG without changes suggesting ischemia. Nephrology consulted for management of ESRD and HD treatment.         Past Medical History:   Diagnosis Date    CKD (chronic kidney disease), stage IV 2022    Diabetes mellitus due to underlying condition with unspecified complications 2022    Gastroparesis 2022    Heart failure with preserved ejection fraction 2022    EF 55% on 3/22    History of gastroesophageal reflux (GERD)     History of supraventricular tachycardia     Hyperkalemia 2022    Hypertensive emergency 2022    Sickle cell trait 2022    Type 2 diabetes mellitus        Past Surgical History:   Procedure Laterality Date     SECTION      x 3    COLONOSCOPY      COLONOSCOPY N/A 2022    Procedure: COLONOSCOPY;  Surgeon:  Jagdeep Cedeno MD;  Location: [facility] ENDO;  Service: Endoscopy;  Laterality: N/A;    ESOPHAGOGASTRODUODENOSCOPY N/A 10/18/2019    Procedure: ESOPHAGOGASTRODUODENOSCOPY (EGD);  Surgeon: Gianluca Mendez MD;  Location: [facility] ENDO;  Service: Endoscopy;  Laterality: N/A;    ESOPHAGOGASTRODUODENOSCOPY N/A 08/24/2022    Procedure: EGD (ESOPHAGOGASTRODUODENOSCOPY);  Surgeon: Micky Paredes III, MD;  Location: [facility] ENDO;  Service: Endoscopy;  Laterality: N/A;    LAPAROSCOPIC CHOLECYSTECTOMY N/A 07/30/2022    Procedure: CHOLECYSTECTOMY, LAPAROSCOPIC;  Surgeon: Grey Perez MD;  Location: [facility] OR;  Service: General;  Laterality: N/A;    PLACEMENT OF DUAL-LUMEN VASCULAR CATHETER Left 07/12/2022    Procedure: INSERTION-CATHETER-JOSEPH;  Surgeon: Dionte Gan MD;  Location: [facility] OR;  Service: General;  Laterality: Left;    PLACEMENT OF DUAL-LUMEN VASCULAR CATHETER Right 07/26/2022    Procedure: INSERTION-CATHETER-Hemosplit;  Surgeon: Dionte Gan MD;  Location: [facility] OR;  Service: General;  Laterality: Right;       Review of patient's allergies indicates:   Allergen Reactions    Penicillins Hives     Current Facility-Administered Medications   Medication Frequency    0.9%  NaCl infusion Once    acetaminophen tablet 1,000 mg Q8H PRN    albuterol inhaler 2 puff Q6H PRN    amLODIPine tablet 10 mg Daily    apixaban tablet 5 mg BID    buprenorphine HCL SL tablet 2 mg Q8H PRN    carvediloL tablet 25 mg BID    dextrose 10% bolus 125 mL PRN    dextrose 10% bolus 250 mL PRN    diclofenac sodium 1 % gel 2 g Daily    erythromycin EC tablet 500 mg TID AC    FLUoxetine capsule 20 mg Daily    furosemide tablet 40 mg BID    gabapentin capsule 100 mg TID    glucagon (human recombinant) injection 1 mg PRN    glucose chewable tablet 16 g PRN    glucose chewable tablet 24 g PRN    hydrALAZINE tablet 100 mg Q8H    hydrOXYzine HCL tablet 25 mg TID PRN    insulin aspart U-100 pen 0-5 Units QID (AC + HS) PRN     insulin detemir U-100 pen 5 Units Daily    isosorbide mononitrate 24 hr tablet 120 mg Daily    ketorolac injection 15 mg Q6H PRN    labetalol 20 mg/4 mL (5 mg/mL) IV syring Q4H PRN    levETIRAcetam tablet 500 mg BID    LIDOcaine 5 % patch 1 patch Q24H    melatonin tablet 6 mg Nightly PRN    ondansetron injection 4 mg Q8H PRN    prochlorperazine injection Soln 5 mg Q6H PRN    sodium chloride 0.9% flush 10 mL PRN     Family History       Problem Relation (Age of Onset)    Diabetes Mother, Father          Tobacco Use    Smoking status: Never    Smokeless tobacco: Never   Substance and Sexual Activity    Alcohol use: Not Currently    Drug use: No    Sexual activity: Not Currently     Partners: Male     Birth control/protection: I.U.D.     Review of Systems   Constitutional:  Positive for fatigue. Negative for fever.   HENT:  Negative for congestion, sore throat and trouble swallowing.    Eyes:  Positive for visual disturbance.   Respiratory:  Positive for cough. Negative for shortness of breath and wheezing.    Cardiovascular:  Positive for chest pain. Negative for palpitations and leg swelling.   Gastrointestinal:  Positive for abdominal pain, nausea and vomiting. Negative for blood in stool, constipation and diarrhea.   Genitourinary:  Negative for dysuria.   Musculoskeletal:  Positive for arthralgias and myalgias.   Skin:  Negative for rash and wound.   Neurological:  Positive for dizziness, light-headedness and headaches.   Psychiatric/Behavioral:  Negative for agitation.    Objective:     Vital Signs (Most Recent):  Temp: 98.1 °F (36.7 °C) (11/12/22 0850)  Pulse: 85 (11/12/22 0945)  Resp: 16 (11/12/22 0713)  BP: (!) 137/91 (11/12/22 0945)  SpO2: (!) 94 % (11/12/22 0713)  O2 Device (Oxygen Therapy): nasal cannula (11/12/22 0850)   Vital Signs (24h Range):  Temp:  [97.6 °F (36.4 °C)-98.4 °F (36.9 °C)] 98.1 °F (36.7 °C)  Pulse:  [] 85  Resp:  [16-25] 16  SpO2:  [94 %-100 %] 94 %  BP:  (132-209)/() 137/91     Weight: 59.9 kg (132 lb 0.9 oz) (11/12/22 0711)  Body mass index is 24.15 kg/m².  Body surface area is 1.62 meters squared.    No intake/output data recorded.    Physical Exam  Vitals and nursing note reviewed.   Constitutional:       General: She is not in acute distress.     Appearance: She is not toxic-appearing or diaphoretic. Ill appearance: Chronically.  HENT:      Head: Normocephalic and atraumatic.      Right Ear: External ear normal.      Left Ear: External ear normal.      Nose: Nose normal.      Mouth/Throat:      Mouth: Mucous membranes are dry.      Pharynx: Oropharynx is clear.   Eyes:      General: No scleral icterus.        Right eye: No discharge.         Left eye: No discharge.      Extraocular Movements: Extraocular movements intact.      Pupils: Pupils are equal, round, and reactive to light.   Cardiovascular:      Rate and Rhythm: Normal rate and regular rhythm.      Pulses: Normal pulses.      Heart sounds: Normal heart sounds.   Pulmonary:      Effort: Pulmonary effort is normal.      Breath sounds: Normal breath sounds.   Abdominal:      General: Bowel sounds are normal.      Tenderness: There is abdominal tenderness (Generalized). There is no guarding or rebound.   Musculoskeletal:      Cervical back: Normal range of motion and neck supple.      Right lower leg: No edema.      Left lower leg: No edema.   Skin:     General: Skin is warm and dry.      Capillary Refill: Capillary refill takes less than 2 seconds.   Neurological:      General: No focal deficit present.      Mental Status: She is oriented to person, place, and time.   Psychiatric:         Speech: Speech is delayed.         Behavior: Behavior is slowed.       Significant Labs:  CBC:   Recent Labs   Lab 11/12/22  0448   WBC 5.01   RBC 3.73*   HGB 10.6*   HCT 31.8*   *   MCV 85   MCH 28.4   MCHC 33.3     CMP:   Recent Labs   Lab 11/12/22  0448   *   CALCIUM 8.5*   ALBUMIN 2.3*   PROT 5.4*    *   K 5.0   CO2 23      BUN 34*   CREATININE 4.7*   ALKPHOS 207*   ALT 40   AST 25   BILITOT 1.1*     All labs within the past 24 hours have been reviewed.    Assessment/Plan:     * Gastroparesis  - defer to primary team     ESRD (end stage renal disease)  32 y.o. Black or  Female ESRD-HD [facility] presents to ED on 11/11/2022 with concerns for gastroparesis.   Nephrology consulted for inpatient ESRD-HD management    Outpatient HD Information:  -Dialysis modality: Hemodialysis  -Outpatient HD unit: [facility]  -Nephrologist: ?  -HD TX days: Monday/Wednesday/Friday, duration of treatment: 4 hrs  -Last HD TX prior to hospital admission: 11/09  -Dialysis access: dialysis catheter   -Residual urine: Anuric   -EDW: 56 kg     Assessment:   - Will provide dialysis today for metabolic clearance and volume management  - Seen on HD. No complaints.   - Labs reviewed and dialysate to be adjusted to current labs.   - Will obtain OP dialysis records  - Continue to monitor intake and output  - Pre and Post-HD weights   - Please avoid gadolinium, fleets, phos-based laxatives, NSAIDs  - Will evaluate HD requirements Daily    Anemia of ESRD   Recent Labs   Lab 11/07/22  0349 11/11/22  1402 11/12/22  0448   WBC 6.47 6.41 5.01   HGB 9.4* 11.3* 10.6*   HCT 29.0* 32.7* 31.8*   * 148* 145*       - Goal in ESRD is Hgb of 10-11.   - EPO can be administered and dosed per his OP unit upon discharge.    Mineral Bone Disease in ESRD   Lab Results   Component Value Date    .8 (H) 07/08/2022    CALCIUM 8.5 (L) 11/12/2022    PHOS 5.4 (H) 11/12/2022       - F/U PO4, Mg, Calcium. And albumin levels.   - Renal diet with protein intake goal 1.5 g/kg/d with 1 L fluid restriction   - Novasource with meals  - Continue home phos binder   - Daily renal panel so that phos and albumin is monitored daily.     Hypertension    - BP Normal  - No lab stick or BP intake on access site.            Thank you for  your consult. I will follow-up with patient. Please contact us if you have any additional questions.    Goldie Zelaya DNP, FNP-C  Nephrology  [facility] ([facility]

## 2022-11-13 LAB
ALBUMIN SERPL BCP-MCNC: 2.3 G/DL (ref 3.5–5.2)
ALP SERPL-CCNC: 192 U/L (ref 55–135)
ALT SERPL W/O P-5'-P-CCNC: 44 U/L (ref 10–44)
ANION GAP SERPL CALC-SCNC: 9 MMOL/L (ref 8–16)
AST SERPL-CCNC: 29 U/L (ref 10–40)
BASOPHILS # BLD AUTO: 0.03 K/UL (ref 0–0.2)
BASOPHILS NFR BLD: 0.7 % (ref 0–1.9)
BILIRUB SERPL-MCNC: 0.7 MG/DL (ref 0.1–1)
BUN SERPL-MCNC: 24 MG/DL (ref 6–20)
CALCIUM SERPL-MCNC: 8.1 MG/DL (ref 8.7–10.5)
CHLORIDE SERPL-SCNC: 104 MMOL/L (ref 95–110)
CO2 SERPL-SCNC: 21 MMOL/L (ref 23–29)
CREAT SERPL-MCNC: 3.8 MG/DL (ref 0.5–1.4)
DIFFERENTIAL METHOD: ABNORMAL
EOSINOPHIL # BLD AUTO: 0.1 K/UL (ref 0–0.5)
EOSINOPHIL NFR BLD: 2.5 % (ref 0–8)
ERYTHROCYTE [DISTWIDTH] IN BLOOD BY AUTOMATED COUNT: 15.8 % (ref 11.5–14.5)
EST. GFR  (NO RACE VARIABLE): 15.4 ML/MIN/1.73 M^2
GLUCOSE SERPL-MCNC: 292 MG/DL (ref 70–110)
HCT VFR BLD AUTO: 30.7 % (ref 37–48.5)
HGB BLD-MCNC: 10 G/DL (ref 12–16)
IMM GRANULOCYTES # BLD AUTO: 0.02 K/UL (ref 0–0.04)
IMM GRANULOCYTES NFR BLD AUTO: 0.5 % (ref 0–0.5)
LYMPHOCYTES # BLD AUTO: 0.9 K/UL (ref 1–4.8)
LYMPHOCYTES NFR BLD: 19.9 % (ref 18–48)
MAGNESIUM SERPL-MCNC: 1.9 MG/DL (ref 1.6–2.6)
MCH RBC QN AUTO: 28.2 PG (ref 27–31)
MCHC RBC AUTO-ENTMCNC: 32.6 G/DL (ref 32–36)
MCV RBC AUTO: 87 FL (ref 82–98)
MONOCYTES # BLD AUTO: 0.4 K/UL (ref 0.3–1)
MONOCYTES NFR BLD: 9.3 % (ref 4–15)
NEUTROPHILS # BLD AUTO: 3 K/UL (ref 1.8–7.7)
NEUTROPHILS NFR BLD: 67.1 % (ref 38–73)
NRBC BLD-RTO: 0 /100 WBC
PHOSPHATE SERPL-MCNC: 5.1 MG/DL (ref 2.7–4.5)
PLATELET # BLD AUTO: 144 K/UL (ref 150–450)
PMV BLD AUTO: 9.9 FL (ref 9.2–12.9)
POCT GLUCOSE: 243 MG/DL (ref 70–110)
POCT GLUCOSE: 255 MG/DL (ref 70–110)
POCT GLUCOSE: 287 MG/DL (ref 70–110)
POCT GLUCOSE: 411 MG/DL (ref 70–110)
POCT GLUCOSE: 491 MG/DL (ref 70–110)
POCT GLUCOSE: 71 MG/DL (ref 70–110)
POTASSIUM SERPL-SCNC: 4.7 MMOL/L (ref 3.5–5.1)
PROT SERPL-MCNC: 5.4 G/DL (ref 6–8.4)
RBC # BLD AUTO: 3.54 M/UL (ref 4–5.4)
SODIUM SERPL-SCNC: 134 MMOL/L (ref 136–145)
WBC # BLD AUTO: 4.42 K/UL (ref 3.9–12.7)

## 2022-11-13 PROCEDURE — 96372 THER/PROPH/DIAG INJ SC/IM: CPT | Performed by: INTERNAL MEDICINE

## 2022-11-13 PROCEDURE — 83735 ASSAY OF MAGNESIUM: CPT | Performed by: STUDENT IN AN ORGANIZED HEALTH CARE EDUCATION/TRAINING PROGRAM

## 2022-11-13 PROCEDURE — 63600175 PHARM REV CODE 636 W HCPCS: Performed by: INTERNAL MEDICINE

## 2022-11-13 PROCEDURE — 80053 COMPREHEN METABOLIC PANEL: CPT | Performed by: STUDENT IN AN ORGANIZED HEALTH CARE EDUCATION/TRAINING PROGRAM

## 2022-11-13 PROCEDURE — 36415 COLL VENOUS BLD VENIPUNCTURE: CPT | Performed by: STUDENT IN AN ORGANIZED HEALTH CARE EDUCATION/TRAINING PROGRAM

## 2022-11-13 PROCEDURE — 84100 ASSAY OF PHOSPHORUS: CPT | Performed by: STUDENT IN AN ORGANIZED HEALTH CARE EDUCATION/TRAINING PROGRAM

## 2022-11-13 PROCEDURE — G0378 HOSPITAL OBSERVATION PER HR: HCPCS

## 2022-11-13 PROCEDURE — 25000003 PHARM REV CODE 250: Performed by: INTERNAL MEDICINE

## 2022-11-13 PROCEDURE — 51798 US URINE CAPACITY MEASURE: CPT

## 2022-11-13 PROCEDURE — 85025 COMPLETE CBC W/AUTO DIFF WBC: CPT | Performed by: STUDENT IN AN ORGANIZED HEALTH CARE EDUCATION/TRAINING PROGRAM

## 2022-11-13 PROCEDURE — 99233 PR SUBSEQUENT HOSPITAL CARE,LEVL III: ICD-10-PCS | Mod: 95,,, | Performed by: INTERNAL MEDICINE

## 2022-11-13 PROCEDURE — 94761 N-INVAS EAR/PLS OXIMETRY MLT: CPT

## 2022-11-13 PROCEDURE — 99233 SBSQ HOSP IP/OBS HIGH 50: CPT | Mod: 95,,, | Performed by: INTERNAL MEDICINE

## 2022-11-13 PROCEDURE — 63600175 PHARM REV CODE 636 W HCPCS: Performed by: NURSE PRACTITIONER

## 2022-11-13 PROCEDURE — 25000003 PHARM REV CODE 250: Performed by: HOSPITALIST

## 2022-11-13 PROCEDURE — 25000003 PHARM REV CODE 250: Performed by: STUDENT IN AN ORGANIZED HEALTH CARE EDUCATION/TRAINING PROGRAM

## 2022-11-13 PROCEDURE — 12000002 HC ACUTE/MED SURGE SEMI-PRIVATE ROOM

## 2022-11-13 RX ORDER — METOCLOPRAMIDE HYDROCHLORIDE 5 MG/ML
5 INJECTION INTRAMUSCULAR; INTRAVENOUS EVERY 8 HOURS
Status: DISPENSED | OUTPATIENT
Start: 2022-11-13 | End: 2022-11-15

## 2022-11-13 RX ORDER — METOCLOPRAMIDE HYDROCHLORIDE 5 MG/ML
5 INJECTION INTRAMUSCULAR; INTRAVENOUS EVERY 8 HOURS
Status: DISCONTINUED | OUTPATIENT
Start: 2022-11-13 | End: 2022-11-13

## 2022-11-13 RX ORDER — LIDOCAINE 50 MG/G
1 PATCH TOPICAL
Status: DISCONTINUED | OUTPATIENT
Start: 2022-11-13 | End: 2022-11-15 | Stop reason: HOSPADM

## 2022-11-13 RX ORDER — TIZANIDINE 2 MG/1
2 TABLET ORAL 2 TIMES DAILY
Status: DISCONTINUED | OUTPATIENT
Start: 2022-11-13 | End: 2022-11-13

## 2022-11-13 RX ORDER — ACETAMINOPHEN 325 MG/1
650 TABLET ORAL EVERY 8 HOURS
Status: DISCONTINUED | OUTPATIENT
Start: 2022-11-13 | End: 2022-11-15 | Stop reason: HOSPADM

## 2022-11-13 RX ORDER — METHOCARBAMOL 500 MG/1
500 TABLET, FILM COATED ORAL 3 TIMES DAILY
Status: DISCONTINUED | OUTPATIENT
Start: 2022-11-13 | End: 2022-11-15 | Stop reason: HOSPADM

## 2022-11-13 RX ORDER — INSULIN ASPART 100 [IU]/ML
5 INJECTION, SOLUTION INTRAVENOUS; SUBCUTANEOUS ONCE
Status: COMPLETED | OUTPATIENT
Start: 2022-11-13 | End: 2022-11-13

## 2022-11-13 RX ADMIN — HYDROXYZINE HYDROCHLORIDE 25 MG: 25 TABLET, FILM COATED ORAL at 12:11

## 2022-11-13 RX ADMIN — ISOSORBIDE MONONITRATE 120 MG: 60 TABLET, EXTENDED RELEASE ORAL at 09:11

## 2022-11-13 RX ADMIN — HYDRALAZINE HYDROCHLORIDE 100 MG: 25 TABLET, FILM COATED ORAL at 11:11

## 2022-11-13 RX ADMIN — ERYTHROMYCIN 500 MG: 250 TABLET, DELAYED RELEASE ORAL at 09:11

## 2022-11-13 RX ADMIN — INSULIN ASPART 5 UNITS: 100 INJECTION, SOLUTION INTRAVENOUS; SUBCUTANEOUS at 12:11

## 2022-11-13 RX ADMIN — DICLOFENAC SODIUM 2 G: 10 GEL TOPICAL at 11:11

## 2022-11-13 RX ADMIN — AMLODIPINE BESYLATE 10 MG: 10 TABLET ORAL at 09:11

## 2022-11-13 RX ADMIN — METHOCARBAMOL 500 MG: 500 TABLET ORAL at 04:11

## 2022-11-13 RX ADMIN — HEPARIN SODIUM 1000 UNITS: 1000 INJECTION, SOLUTION INTRAVENOUS; SUBCUTANEOUS at 10:11

## 2022-11-13 RX ADMIN — METOCLOPRAMIDE 5 MG: 5 INJECTION, SOLUTION INTRAMUSCULAR; INTRAVENOUS at 03:11

## 2022-11-13 RX ADMIN — LEVETIRACETAM 500 MG: 500 TABLET, FILM COATED ORAL at 08:11

## 2022-11-13 RX ADMIN — LEVETIRACETAM 500 MG: 500 TABLET, FILM COATED ORAL at 09:11

## 2022-11-13 RX ADMIN — APIXABAN 5 MG: 5 TABLET, FILM COATED ORAL at 09:11

## 2022-11-13 RX ADMIN — INSULIN DETEMIR 5 UNITS: 100 INJECTION, SOLUTION SUBCUTANEOUS at 09:11

## 2022-11-13 RX ADMIN — HYDRALAZINE HYDROCHLORIDE 100 MG: 25 TABLET, FILM COATED ORAL at 06:11

## 2022-11-13 RX ADMIN — INSULIN ASPART 5 UNITS: 100 INJECTION, SOLUTION INTRAVENOUS; SUBCUTANEOUS at 01:11

## 2022-11-13 RX ADMIN — CARVEDILOL 25 MG: 25 TABLET, FILM COATED ORAL at 09:11

## 2022-11-13 RX ADMIN — GABAPENTIN 100 MG: 300 CAPSULE ORAL at 08:11

## 2022-11-13 RX ADMIN — FUROSEMIDE 40 MG: 40 TABLET ORAL at 09:11

## 2022-11-13 RX ADMIN — HYDROXYZINE HYDROCHLORIDE 25 MG: 25 TABLET, FILM COATED ORAL at 06:11

## 2022-11-13 RX ADMIN — ERYTHROMYCIN 500 MG: 250 TABLET, DELAYED RELEASE ORAL at 12:11

## 2022-11-13 RX ADMIN — SODIUM CHLORIDE: 0.9 INJECTION, SOLUTION INTRAVENOUS at 11:11

## 2022-11-13 RX ADMIN — GABAPENTIN 100 MG: 300 CAPSULE ORAL at 09:11

## 2022-11-13 RX ADMIN — ACETAMINOPHEN 650 MG: 325 TABLET ORAL at 11:11

## 2022-11-13 RX ADMIN — GABAPENTIN 100 MG: 300 CAPSULE ORAL at 03:11

## 2022-11-13 RX ADMIN — BUPRENORPHINE 2 MG: 2 TABLET SUBLINGUAL at 12:11

## 2022-11-13 RX ADMIN — INSULIN ASPART 2 UNITS: 100 INJECTION, SOLUTION INTRAVENOUS; SUBCUTANEOUS at 09:11

## 2022-11-13 RX ADMIN — METHOCARBAMOL 500 MG: 500 TABLET ORAL at 08:11

## 2022-11-13 RX ADMIN — ERYTHROMYCIN 500 MG: 250 TABLET, DELAYED RELEASE ORAL at 03:11

## 2022-11-13 RX ADMIN — INSULIN ASPART 3 UNITS: 100 INJECTION, SOLUTION INTRAVENOUS; SUBCUTANEOUS at 06:11

## 2022-11-13 RX ADMIN — CEFTRIAXONE 1 G: 1 INJECTION, SOLUTION INTRAVENOUS at 03:11

## 2022-11-13 RX ADMIN — CARVEDILOL 25 MG: 25 TABLET, FILM COATED ORAL at 08:11

## 2022-11-13 RX ADMIN — METOCLOPRAMIDE 5 MG: 5 INJECTION, SOLUTION INTRAMUSCULAR; INTRAVENOUS at 08:11

## 2022-11-13 RX ADMIN — FUROSEMIDE 40 MG: 40 TABLET ORAL at 08:11

## 2022-11-13 RX ADMIN — FLUOXETINE 20 MG: 20 CAPSULE ORAL at 09:11

## 2022-11-13 RX ADMIN — APIXABAN 2.5 MG: 2.5 TABLET, FILM COATED ORAL at 08:11

## 2022-11-13 NOTE — ASSESSMENT & PLAN NOTE
Likely due to visceral edema.  Bladder scan < 20 mL; Bladder edema noted, possible UTI - treating 3 ceftriaxone 3 days.  Multimodal pain management  Continue UF per Nephrology.

## 2022-11-13 NOTE — PLAN OF CARE
Problem: Adult Inpatient Plan of Care  Goal: Plan of Care Review  Outcome: Ongoing, Progressing  Flowsheets (Taken 11/12/2022 1835)  Plan of Care Reviewed With: patient  Goal: Patient-Specific Goal (Individualized)  Outcome: Ongoing, Progressing  Goal: Absence of Hospital-Acquired Illness or Injury  Outcome: Ongoing, Progressing  Intervention: Identify and Manage Fall Risk  Flowsheets (Taken 11/12/2022 1835)  Safety Promotion/Fall Prevention:   assistive device/personal item within reach   side rails raised x 2  Intervention: Prevent Skin Injury  Flowsheets (Taken 11/12/2022 1835)  Skin Protection:   adhesive use limited   tubing/devices free from skin contact  Intervention: Prevent and Manage VTE (Venous Thromboembolism) Risk  Flowsheets (Taken 11/12/2022 1835)  Activity Management: Rolling - L1  VTE Prevention/Management: bleeding risk assessed  Intervention: Prevent Infection  Flowsheets (Taken 11/12/2022 1835)  Infection Prevention: single patient room provided

## 2022-11-13 NOTE — SUBJECTIVE & OBJECTIVE
Telemedicine  This service was provided by [facility].    Patient was transferred to [facility] on:  11/13/2022    Chief Complaint   Patient presents with    Generalized Body Aches     Pt reporting generalized body pain. Last dialysis was Wednesday.      The patient location is: [id number]   Admitted 11/11/2022  1:12 PM    Interval History / Events Overnight:   The patient is able to provide adequate history. Additional history was obtained from past medical records. On Call Provider contacted about hyperglycemia  Patient complains of abdominal and back pain. Symptoms have been unchanged since yesterday. Associated symptoms include: shortness of breath on exertion, fatigue, and malaise. Symptoms are stable.     Lab test(s) reviewed: H&H down-trending; ferritin elevated    Review of Systems   Constitutional:  Negative for fever.   Respiratory:  Positive for shortness of breath.    Gastrointestinal:  Negative for vomiting.     Objective:     Vital Signs (Most Recent):  Temp: 98.9 °F (37.2 °C) (11/13/22 1050)  Pulse: 100 (11/13/22 1136)  Resp: 15 (11/13/22 1016)  BP: (!) 142/77 (11/13/22 1050)  SpO2: (!) 93 % (11/13/22 0749)   Vital Signs (24h Range):  Temp:  [97.6 °F (36.4 °C)-98.9 °F (37.2 °C)] 98.9 °F (37.2 °C)  Pulse:  [] 100  Resp:  [15-18] 15  SpO2:  [91 %-95 %] 93 %  BP: (111-152)/(66-87) 142/77     Weight: 57.1 kg (125 lb 14.1 oz)  Body mass index is 23.02 kg/m².    Intake/Output Summary (Last 24 hours) at 11/13/2022 1233  Last data filed at 11/13/2022 1050  Gross per 24 hour   Intake 500 ml   Output --   Net 500 ml      Physical Exam  Constitutional:       General: She is not in acute distress.     Appearance: Normal appearance.   Eyes:      General: Lids are normal. No scleral icterus.        Right eye: No discharge.         Left eye: No discharge.      Conjunctiva/sclera: Conjunctivae normal.      Comments: Poor vision in both eyes   Neck:      Trachea: Phonation normal.    Cardiovascular:      Rate and Rhythm: Tachycardia present.      Comments: Monitor / Vital signs reviewed at time of visit  Pulmonary:      Effort: Pulmonary effort is normal. No tachypnea, accessory muscle usage or respiratory distress.   Abdominal:      General: There is no distension.   Neurological:      Mental Status: She is alert. She is not disoriented.   Psychiatric:         Attention and Perception: Attention normal.         Mood and Affect: Affect is tearful.         Behavior: Behavior is cooperative.       Significant Labs:   Recent Labs   Lab 07/22/22  1653 08/24/22  0218   HGBA1C 6.0* 6.2     Recent Labs   Lab 11/13/22  1203 11/13/22  1324 11/13/22  1539   POCTGLUCOSE 491* 411* 255*     Recent Labs   Lab 11/11/22  1402 11/12/22  0448 11/13/22  0346   WBC 6.41 5.01 4.42   HGB 11.3* 10.6* 10.0*   HCT 32.7* 31.8* 30.7*   * 145* 144*     Recent Labs   Lab 11/11/22  1402 11/12/22 0448 11/13/22  0346   GRAN 73.5*  4.7 71.6  3.6 67.1  3.0   LYMPH 13.6*  0.9* 18.4  0.9* 19.9  0.9*   MONO 10.6  0.7 8.2  0.4 9.3  0.4   EOS 0.1 0.1 0.1     Recent Labs   Lab 11/11/22  1402 11/12/22 0448 11/13/22  0346   * 135* 134*   K 4.1 5.0 4.7   CL 98 100 104   CO2 23 23 21*   BUN 28* 34* 24*   CREATININE 4.0* 4.7* 3.8*   GLU 65* 191* 292*   CALCIUM 9.6 8.5* 8.1*   ALBUMIN 3.0* 2.3* 2.3*   MG  --  1.9 1.9   PHOS  --  5.4* 5.1*     Recent Labs   Lab 11/07/22  0349 11/11/22  1402 11/11/22  1950 11/12/22 0448 11/13/22  0346   ALKPHOS  --  263*  --  207* 192*   ALT  --  58*  --  40 44   AST  --  33  --  25 29   PROT  --  6.7  --  5.4* 5.4*   BILITOT  --  1.5*  --  1.1* 0.7   LIPASE  --   --  17  --   --    *  --   --   --   --      Recent Labs   Lab 11/11/22  1402   TROPONINI 0.039*   BNP 3,578*     Recent Labs   Lab 08/23/22  1155 10/06/22  1617 10/26/22  1235 10/29/22  1431 11/07/22  0349 11/11/22  1402   LACTATE  --  0.7  --  0.7  --  0.9   FERRITIN 14,369*  --  3,204*  --  4,588*  --       Results for orders placed or performed during the hospital encounter of 02/27/20   Vitamin D   Result Value Ref Range    Vit D, 25-Hydroxy 11 (L) 30 - 96 ng/mL     SARS-CoV-2 RNA, Amplification, Qual (no units)   Date Value   11/06/2022 Negative   10/26/2022 Negative   10/16/2022 Negative   08/23/2022 Negative   08/04/2022 Negative   07/28/2022 Negative   07/07/2022 Negative   05/28/2022 Negative   09/10/2020 Negative     POC Rapid COVID (no units)   Date Value   12/20/2021 Negative   04/23/2021 Negative   12/13/2020 Negative       ECG Results    None         Results for orders placed during the hospital encounter of 10/26/22    Echo Saline Bubble? No    Interpretation Summary  · The left ventricular global longitudinal strain is --11.45%%.  · Moderate concentric hypertrophy and moderately decreased systolic function.  · The estimated ejection fraction is 34%.  · Left ventricular diastolic dysfunction.  · Strain study demonstrates apical sparing with normal G LS at the apex a -20 and then in the midportion bases -10- 7  · Strain study is compatible with amyloid but not diagnostic of amyloid      CT Chest Abdomen Pelvis Without Contrast (XPD)  Narrative: EXAMINATION:  CT CHEST ABDOMEN PELVIS WITHOUT CONTRAST(XPD)    CLINICAL HISTORY:  n/v, abdo pain, cp;    TECHNIQUE:  Low dose axial images, sagittal and coronal reformations were obtained from the thoracic inlet to the pubic symphysis .  Oral contrast was not administered.    COMPARISON:  CTA chest 10/29/2022, CT abdomen and pelvis 10/26/2022    FINDINGS:  The structures at the base of the neck are remarkable for bilateral low attenuating thyroid nodules measuring up to 1.6 cm within the inferior aspects of the thyroid.  Nonemergent ultrasound could be performed for further evaluation as warranted.  There is a moderate pericardial effusion worsened since the previous examination.  Right central venous catheter tip terminates at the level of the distal SVC/right  atrial junction.  The heart is not enlarged.  The thoracic aorta tapers normally noting atherosclerotic calcification along its course.    The airways are patent.  There is patchy ground-glass attenuation throughout the pulmonary parenchyma bilaterally noting scattered regions of patchy consolidation within the bilateral lower lobes, left greater than right, also along the fissure on the left.  No pneumothorax.  No pleural effusion.  There is bilateral basilar dependent atelectasis.    Allowing for motion artifact, the liver is somewhat high attenuating, correlation with any history of iron overload or amiodarone use.  The spleen is enlarged.  The adrenal glands and pancreas are grossly unremarkable.  The stomach is decompressed noting there is edema throughout the gastric walls, correlation with any history of gastritis.  The gallbladder is surgically absent.  No significant biliary dilation.  No significant abdominal lymphadenopathy.    There is no right hydronephrosis or right nephrolithiasis noting right renal vascular calcification.  The right ureter is unable to be followed in its entirety to the urinary bladder, no definite calculi seen.  There is left perinephric fat stranding.  There is mild-to-moderate left hydronephrosis.  The left ureter is prominent.  No definite calculi seen along the course of the left ureter.  The urinary bladder is distended noting wall thickening.  The uterus and adnexa are grossly unremarkable.  There is fluid in the pelvis.    There are a few scattered colonic diverticula without inflammation.  There is moderate stool in the colon.  The terminal ileum is unremarkable.  The appendix is unremarkable.  The small bowel is grossly unremarkable.  There is strand-like fluid in the abdomen and pelvis.  No focal organized pelvic fluid collection.    No acute osseous abnormality.  No significant inguinal lymphadenopathy.  No significant axillary lymphadenopathy.  There is diffuse body wall  anasarca.  Impression: This report was flagged in Epic as abnormal.    1. In comparison to examination 10/29/2022, pericardial effusion has worsened as well as worsening body wall anasarca and fluid in the pelvis, findings are concerning for volume overload, correlation is advised.  2. Patchy ground-glass and consolidation throughout the pulmonary parenchyma particularly involving the bilateral lower lobes findings are concerning for edema and superimposed developing infectious process.  Correlation is advised.  3. There is edema throughout the gastric walls, new since the previous exam.  This may be on the basis of volume overload or gastritis.  Correlation recommended.  4. Urinary bladder is mildly distended noting diffuse wall thickening, correlation with urinalysis advised to exclude cystitis.  The differential would potentially include sequela of recently passed calculus however infection is of concern.  Correlation advised.  5. Please see above for several additional findings.    Electronically signed by: Max Mancia MD  Date:    11/11/2022  Time:    18:38  X-Ray Chest AP Portable  Narrative: EXAMINATION:  XR CHEST AP PORTABLE    CLINICAL HISTORY:  Shortness of breath;    TECHNIQUE:  Single frontal view of the chest was performed.    COMPARISON:  November 6, 2022    FINDINGS:  Reconfirmed large-bore right jugular catheter, tip superimposing right atrial soft tissues as before.  Stable enlargement of cardiopericardial silhouette.  Normal pulmonary vasculature.  No focal infiltrates.  No pleural effusions or pneumothorax.  No acute bony findings.  Impression: Stable position of large-bore right jugular catheter and enlargement of cardiopericardial silhouette with no active pulmonary findings and no detrimental changes compared to earlier exam    Electronically signed by: aDrius Esquivel  Date:    11/11/2022  Time:    14:41      Labs and Imaging within the last 24 hours listed above were reviewed.       Diet:  Diet diabetic [facility]; 2000 Calorie; [place], Renal  Significant LDAs:   IV Access Type: Peripheral and Dialysis Access  Urinary Catheter Indication if present: Patient Does Not Have Urinary Catheter  Other Lines/Tubes/Drains:    HIGH RISK CONDITION(S):   Patient has a condition that poses threat to life and bodily function: Volume Overload and Respiratory distress    Goals of Care:    Previous admission:  11/6/22  Likely prognosis:  Fair  Code Status: Full Code  Comfort Only: No  Hospice: No  Goals at discharge: remain at home, with physician follow-up    Discharge Planning   TATIANA: 11/16/2022     Code Status: Full Code   Is the patient medically ready for discharge?: No    Reason for patient still in hospital (select all that apply): Patient trending condition and Treatment

## 2022-11-13 NOTE — ASSESSMENT & PLAN NOTE
"Abdominal pain   Epigastric pain  Nausea and vomiting  Multiple admissions for similar symptomology per chart review but no documented evidence of delayed gastric emptying.  Further, prior hospital medicine notes comment on patient distractibility on examined concern for pain seeking behavior.  As per a prior admission, MD note with "She has very dramatic behavior, likes only IV Dilaudid"  Patient afebrile, hypertensive upon admission, on room air.  Labs stable to improved compared to prior admission; do not indicate acute intra-abdominal pathology.  Lactic acid negative.  Troponin improved from prior.  BNP improved from prior.  EKG without changes suggesting ischemia.  - Erythromycin started for management of suspected gastroparesis; Reglan cautiously used - concern with seizure history  Supportive care, antiemetics prn  Avoid opioids; apparently discussed with patient on admission  Multimodal pain control with Tylenol, gabapentin, buprenorphine, Voltaren gel, lidocaine patch, Robaxin  Advance diet as tolerated  QTC monitoring  "

## 2022-11-13 NOTE — ASSESSMENT & PLAN NOTE
Nephrology consulted for HD, appreciate assistance with severe volume overload  Potentially transportation barriers to outpatient HD

## 2022-11-13 NOTE — CONSULTS
[facility] - [facility] ([place] [address])  [facility] Medicine  Telemedicine Consult Note    Patient Name: Tabby Howard  MRN: [id number]  Admission Date: 11/11/2022  Hospital Length of Stay: 0 days  Attending Physician: Ilana Tillman MD   Primary Care Provider: Primary Doctor No       Thank you for your consult to [facility]. We have reviewed the patient chart. This patient does meet criteria for [facility] service at this time. Will assume care on 11/13/22 at 7AM.        Светлана Fiore MD  Department of Hospital Medicine   [facility] - Intensive [facility] ([place])

## 2022-11-13 NOTE — ASSESSMENT & PLAN NOTE
Uncontrolled upon admission in the setting of nausea, vomiting, pain  Continue home amlodipine, metoprolol, hydralazine, Imdur  continue to monitor blood pressure trend closely and adjust antihypertensive regimen as clinically indicated and tolerated.

## 2022-11-14 LAB
ALBUMIN SERPL BCP-MCNC: 2.6 G/DL (ref 3.5–5.2)
ALP SERPL-CCNC: 195 U/L (ref 55–135)
ALT SERPL W/O P-5'-P-CCNC: 35 U/L (ref 10–44)
ANION GAP SERPL CALC-SCNC: 10 MMOL/L (ref 8–16)
AST SERPL-CCNC: 19 U/L (ref 10–40)
BASOPHILS # BLD AUTO: 0.02 K/UL (ref 0–0.2)
BASOPHILS NFR BLD: 0.4 % (ref 0–1.9)
BILIRUB SERPL-MCNC: 0.8 MG/DL (ref 0.1–1)
BUN SERPL-MCNC: 16 MG/DL (ref 6–20)
CALCIUM SERPL-MCNC: 8.5 MG/DL (ref 8.7–10.5)
CHLORIDE SERPL-SCNC: 104 MMOL/L (ref 95–110)
CO2 SERPL-SCNC: 21 MMOL/L (ref 23–29)
CREAT SERPL-MCNC: 2.8 MG/DL (ref 0.5–1.4)
DIFFERENTIAL METHOD: ABNORMAL
EOSINOPHIL # BLD AUTO: 0.1 K/UL (ref 0–0.5)
EOSINOPHIL NFR BLD: 2.1 % (ref 0–8)
ERYTHROCYTE [DISTWIDTH] IN BLOOD BY AUTOMATED COUNT: 15.9 % (ref 11.5–14.5)
EST. GFR  (NO RACE VARIABLE): 22.2 ML/MIN/1.73 M^2
GLUCOSE SERPL-MCNC: 286 MG/DL (ref 70–110)
HCG INTACT+B SERPL-ACNC: <2.4 MIU/ML
HCT VFR BLD AUTO: 30.1 % (ref 37–48.5)
HGB BLD-MCNC: 9.6 G/DL (ref 12–16)
IMM GRANULOCYTES # BLD AUTO: 0.02 K/UL (ref 0–0.04)
IMM GRANULOCYTES NFR BLD AUTO: 0.4 % (ref 0–0.5)
LYMPHOCYTES # BLD AUTO: 0.8 K/UL (ref 1–4.8)
LYMPHOCYTES NFR BLD: 15.7 % (ref 18–48)
MAGNESIUM SERPL-MCNC: 1.8 MG/DL (ref 1.6–2.6)
MCH RBC QN AUTO: 28.2 PG (ref 27–31)
MCHC RBC AUTO-ENTMCNC: 31.9 G/DL (ref 32–36)
MCV RBC AUTO: 89 FL (ref 82–98)
MONOCYTES # BLD AUTO: 0.6 K/UL (ref 0.3–1)
MONOCYTES NFR BLD: 11.7 % (ref 4–15)
NEUTROPHILS # BLD AUTO: 3.3 K/UL (ref 1.8–7.7)
NEUTROPHILS NFR BLD: 69.7 % (ref 38–73)
NRBC BLD-RTO: 0 /100 WBC
PHOSPHATE SERPL-MCNC: 3.7 MG/DL (ref 2.7–4.5)
PLATELET # BLD AUTO: 133 K/UL (ref 150–450)
PMV BLD AUTO: 10.3 FL (ref 9.2–12.9)
POCT GLUCOSE: 101 MG/DL (ref 70–110)
POCT GLUCOSE: 228 MG/DL (ref 70–110)
POCT GLUCOSE: 261 MG/DL (ref 70–110)
POCT GLUCOSE: 339 MG/DL (ref 70–110)
POCT GLUCOSE: >500 MG/DL (ref 70–110)
POTASSIUM SERPL-SCNC: 3.9 MMOL/L (ref 3.5–5.1)
PROT SERPL-MCNC: 6 G/DL (ref 6–8.4)
RBC # BLD AUTO: 3.4 M/UL (ref 4–5.4)
SODIUM SERPL-SCNC: 135 MMOL/L (ref 136–145)
WBC # BLD AUTO: 4.77 K/UL (ref 3.9–12.7)

## 2022-11-14 PROCEDURE — 80100014 HC HEMODIALYSIS 1:1

## 2022-11-14 PROCEDURE — 83735 ASSAY OF MAGNESIUM: CPT | Performed by: STUDENT IN AN ORGANIZED HEALTH CARE EDUCATION/TRAINING PROGRAM

## 2022-11-14 PROCEDURE — 63600175 PHARM REV CODE 636 W HCPCS: Performed by: INTERNAL MEDICINE

## 2022-11-14 PROCEDURE — 25000003 PHARM REV CODE 250: Performed by: NURSE PRACTITIONER

## 2022-11-14 PROCEDURE — 99232 SBSQ HOSP IP/OBS MODERATE 35: CPT | Mod: ,,, | Performed by: NURSE PRACTITIONER

## 2022-11-14 PROCEDURE — 25000003 PHARM REV CODE 250: Performed by: STUDENT IN AN ORGANIZED HEALTH CARE EDUCATION/TRAINING PROGRAM

## 2022-11-14 PROCEDURE — 25000003 PHARM REV CODE 250: Performed by: HOSPITALIST

## 2022-11-14 PROCEDURE — 99233 SBSQ HOSP IP/OBS HIGH 50: CPT | Mod: 95,,, | Performed by: INTERNAL MEDICINE

## 2022-11-14 PROCEDURE — 85025 COMPLETE CBC W/AUTO DIFF WBC: CPT | Performed by: STUDENT IN AN ORGANIZED HEALTH CARE EDUCATION/TRAINING PROGRAM

## 2022-11-14 PROCEDURE — 84702 CHORIONIC GONADOTROPIN TEST: CPT | Performed by: INTERNAL MEDICINE

## 2022-11-14 PROCEDURE — 84100 ASSAY OF PHOSPHORUS: CPT | Performed by: STUDENT IN AN ORGANIZED HEALTH CARE EDUCATION/TRAINING PROGRAM

## 2022-11-14 PROCEDURE — 80053 COMPREHEN METABOLIC PANEL: CPT | Performed by: STUDENT IN AN ORGANIZED HEALTH CARE EDUCATION/TRAINING PROGRAM

## 2022-11-14 PROCEDURE — G0257 UNSCHED DIALYSIS ESRD PT HOS: HCPCS

## 2022-11-14 PROCEDURE — 12000002 HC ACUTE/MED SURGE SEMI-PRIVATE ROOM

## 2022-11-14 PROCEDURE — 63600175 PHARM REV CODE 636 W HCPCS: Performed by: NURSE PRACTITIONER

## 2022-11-14 PROCEDURE — 25000003 PHARM REV CODE 250: Performed by: INTERNAL MEDICINE

## 2022-11-14 PROCEDURE — 99233 PR SUBSEQUENT HOSPITAL CARE,LEVL III: ICD-10-PCS | Mod: 95,,, | Performed by: INTERNAL MEDICINE

## 2022-11-14 PROCEDURE — 99232 PR SUBSEQUENT HOSPITAL CARE,LEVL II: ICD-10-PCS | Mod: ,,, | Performed by: NURSE PRACTITIONER

## 2022-11-14 PROCEDURE — 36415 COLL VENOUS BLD VENIPUNCTURE: CPT | Performed by: INTERNAL MEDICINE

## 2022-11-14 RX ORDER — INSULIN ASPART 100 [IU]/ML
5 INJECTION, SOLUTION INTRAVENOUS; SUBCUTANEOUS ONCE
Status: COMPLETED | OUTPATIENT
Start: 2022-11-14 | End: 2022-11-14

## 2022-11-14 RX ORDER — MUPIROCIN 20 MG/G
OINTMENT TOPICAL 2 TIMES DAILY
Status: DISCONTINUED | OUTPATIENT
Start: 2022-11-14 | End: 2022-11-15 | Stop reason: HOSPADM

## 2022-11-14 RX ORDER — BUPRENORPHINE 2 MG/1
2 TABLET SUBLINGUAL 2 TIMES DAILY PRN
Status: DISCONTINUED | OUTPATIENT
Start: 2022-11-14 | End: 2022-11-15 | Stop reason: HOSPADM

## 2022-11-14 RX ORDER — SODIUM CHLORIDE 9 MG/ML
INJECTION, SOLUTION INTRAVENOUS ONCE
Status: DISCONTINUED | OUTPATIENT
Start: 2022-11-15 | End: 2022-11-15 | Stop reason: HOSPADM

## 2022-11-14 RX ADMIN — LEVETIRACETAM 500 MG: 500 TABLET, FILM COATED ORAL at 09:11

## 2022-11-14 RX ADMIN — APIXABAN 2.5 MG: 2.5 TABLET, FILM COATED ORAL at 09:11

## 2022-11-14 RX ADMIN — HYDRALAZINE HYDROCHLORIDE 100 MG: 25 TABLET, FILM COATED ORAL at 09:11

## 2022-11-14 RX ADMIN — FUROSEMIDE 40 MG: 40 TABLET ORAL at 09:11

## 2022-11-14 RX ADMIN — GABAPENTIN 100 MG: 300 CAPSULE ORAL at 09:11

## 2022-11-14 RX ADMIN — BUPRENORPHINE 2 MG: 2 TABLET SUBLINGUAL at 09:11

## 2022-11-14 RX ADMIN — METOCLOPRAMIDE 5 MG: 5 INJECTION, SOLUTION INTRAMUSCULAR; INTRAVENOUS at 09:11

## 2022-11-14 RX ADMIN — METHOCARBAMOL 500 MG: 500 TABLET ORAL at 09:11

## 2022-11-14 RX ADMIN — INSULIN DETEMIR 5 UNITS: 100 INJECTION, SOLUTION SUBCUTANEOUS at 10:11

## 2022-11-14 RX ADMIN — BUPRENORPHINE 2 MG: 2 TABLET SUBLINGUAL at 12:11

## 2022-11-14 RX ADMIN — ACETAMINOPHEN 650 MG: 325 TABLET ORAL at 01:11

## 2022-11-14 RX ADMIN — AMLODIPINE BESYLATE 10 MG: 10 TABLET ORAL at 09:11

## 2022-11-14 RX ADMIN — ACETAMINOPHEN 650 MG: 325 TABLET ORAL at 09:11

## 2022-11-14 RX ADMIN — ERYTHROMYCIN 500 MG: 250 TABLET, DELAYED RELEASE ORAL at 10:11

## 2022-11-14 RX ADMIN — HYDRALAZINE HYDROCHLORIDE 100 MG: 25 TABLET, FILM COATED ORAL at 01:11

## 2022-11-14 RX ADMIN — METOCLOPRAMIDE 5 MG: 5 INJECTION, SOLUTION INTRAMUSCULAR; INTRAVENOUS at 01:11

## 2022-11-14 RX ADMIN — HEPARIN SODIUM 1000 UNITS: 1000 INJECTION, SOLUTION INTRAVENOUS; SUBCUTANEOUS at 08:11

## 2022-11-14 RX ADMIN — DICLOFENAC SODIUM 2 G: 10 GEL TOPICAL at 09:11

## 2022-11-14 RX ADMIN — ERYTHROMYCIN 500 MG: 250 TABLET, DELAYED RELEASE ORAL at 06:11

## 2022-11-14 RX ADMIN — GABAPENTIN 100 MG: 300 CAPSULE ORAL at 03:11

## 2022-11-14 RX ADMIN — CARVEDILOL 25 MG: 25 TABLET, FILM COATED ORAL at 09:11

## 2022-11-14 RX ADMIN — LIDOCAINE 1 PATCH: 50 PATCH CUTANEOUS at 06:11

## 2022-11-14 RX ADMIN — INSULIN ASPART 5 UNITS: 100 INJECTION, SOLUTION INTRAVENOUS; SUBCUTANEOUS at 05:11

## 2022-11-14 RX ADMIN — CEFTRIAXONE 1 G: 1 INJECTION, SOLUTION INTRAVENOUS at 01:11

## 2022-11-14 RX ADMIN — ISOSORBIDE MONONITRATE 120 MG: 60 TABLET, EXTENDED RELEASE ORAL at 09:11

## 2022-11-14 RX ADMIN — FLUOXETINE 20 MG: 20 CAPSULE ORAL at 09:11

## 2022-11-14 RX ADMIN — METHOCARBAMOL 500 MG: 500 TABLET ORAL at 03:11

## 2022-11-14 RX ADMIN — Medication 6 MG: at 09:11

## 2022-11-14 RX ADMIN — INSULIN ASPART 4 UNITS: 100 INJECTION, SOLUTION INTRAVENOUS; SUBCUTANEOUS at 01:11

## 2022-11-14 NOTE — SUBJECTIVE & OBJECTIVE
Telemedicine  This service was provided by [email or web address] [facility].    Patient was transferred to [facility] on:  11/13/2022    Chief Complaint   Patient presents with    Generalized Body Aches     Pt reporting generalized body pain. Last dialysis was Wednesday.      The patient location is: [id number]   Admitted 11/11/2022  1:12 PM    Interval History / Events Overnight:   The patient is able to provide adequate history. Additional history was obtained from past medical records. No significant events reported by Nursing. Tolerating diet  Patient complains of abdominal and back pain. Symptoms have improved since yesterday. Associated symptoms include: fatigue and malaise. Symptoms are decreasing in severity.     Lab test(s) reviewed: Hyperglycemia. H&H down-trending; ferritin elevated    Review of Systems   Constitutional:  Negative for fever.   Respiratory:  Negative for shortness of breath.    Gastrointestinal:  Negative for nausea and vomiting.     Objective:     Vital Signs (Most Recent):  Temp: 97.6 °F (36.4 °C) (11/14/22 1127)  Pulse: 91 (11/14/22 1142)  Resp: 18 (11/14/22 1127)  BP: (!) 169/89 (11/14/22 1127)  SpO2: (!) 94 % (11/14/22 1127)   Vital Signs (24h Range):  Temp:  [97.6 °F (36.4 °C)-99 °F (37.2 °C)] 97.6 °F (36.4 °C)  Pulse:  [] 91  Resp:  [16-18] 18  SpO2:  [94 %-99 %] 94 %  BP: ()/(55-92) 169/89     Weight: 57.1 kg (125 lb 14.1 oz)  Body mass index is 23.02 kg/m².    Intake/Output Summary (Last 24 hours) at 11/14/2022 1400  Last data filed at 11/14/2022 0832  Gross per 24 hour   Intake 600 ml   Output 2500 ml   Net -1900 ml        Physical Exam  Constitutional:       General: She is not in acute distress.     Appearance: Normal appearance.   Eyes:      General: Lids are normal. No scleral icterus.        Right eye: No discharge.         Left eye: No discharge.      Conjunctiva/sclera: Conjunctivae normal.      Comments: Poor vision in both eyes   Neck:      Trachea: Phonation  normal.   Cardiovascular:      Rate and Rhythm: Normal rate.      Comments: Monitor / Vital signs reviewed at time of visit  Pulmonary:      Effort: Pulmonary effort is normal. No tachypnea, accessory muscle usage or respiratory distress.   Abdominal:      General: There is no distension.   Neurological:      Mental Status: She is alert. She is not disoriented.   Psychiatric:         Attention and Perception: Attention normal.         Mood and Affect: Affect normal.         Behavior: Behavior is cooperative.       Significant Labs:   Recent Labs   Lab 07/22/22  1653 08/24/22  0218   HGBA1C 6.0* 6.2       Recent Labs   Lab 11/13/22  1843 11/14/22  1006 11/14/22  1115   POCTGLUCOSE 287* 261* 339*       Recent Labs   Lab 11/12/22 0448 11/13/22  0346 11/14/22  1029   WBC 5.01 4.42 4.77   HGB 10.6* 10.0* 9.6*   HCT 31.8* 30.7* 30.1*   * 144* 133*       Recent Labs   Lab 11/12/22 0448 11/13/22 0346 11/14/22  1029   GRAN 71.6  3.6 67.1  3.0 69.7  3.3   LYMPH 18.4  0.9* 19.9  0.9* 15.7*  0.8*   MONO 8.2  0.4 9.3  0.4 11.7  0.6   EOS 0.1 0.1 0.1       Recent Labs   Lab 11/12/22  0448 11/13/22  0346 11/14/22  1029   * 134* 135*   K 5.0 4.7 3.9    104 104   CO2 23 21* 21*   BUN 34* 24* 16   CREATININE 4.7* 3.8* 2.8*   * 292* 286*   CALCIUM 8.5* 8.1* 8.5*   ALBUMIN 2.3* 2.3* 2.6*   MG 1.9 1.9 1.8   PHOS 5.4* 5.1* 3.7       Recent Labs   Lab 11/11/22  1950 11/12/22 0448 11/13/22 0346 11/14/22  1029   ALKPHOS  --  207* 192* 195*   ALT  --  40 44 35   AST  --  25 29 19   PROT  --  5.4* 5.4* 6.0   BILITOT  --  1.1* 0.7 0.8   LIPASE 17  --   --   --        Recent Labs   Lab 11/11/22  1402   TROPONINI 0.039*   BNP 3,578*       Recent Labs   Lab 08/23/22  1155 10/06/22  1617 10/26/22  1235 10/29/22  1431 11/07/22  0349 11/11/22  1402   LACTATE  --  0.7  --  0.7  --  0.9   FERRITIN 14,369*  --  3,204*  --  4,588*  --        SARS-CoV-2 RNA, Amplification, Qual (no units)   Date Value   11/06/2022  Negative   10/26/2022 Negative   10/16/2022 Negative   08/23/2022 Negative   08/04/2022 Negative   07/28/2022 Negative   07/07/2022 Negative   05/28/2022 Negative   09/10/2020 Negative     POC Rapid COVID (no units)   Date Value   12/20/2021 Negative   04/23/2021 Negative   12/13/2020 Negative       ECG Results    None         Results for orders placed during the hospital encounter of 10/26/22    Echo Saline Bubble? No    Interpretation Summary  · The left ventricular global longitudinal strain is --11.45%%.  · Moderate concentric hypertrophy and moderately decreased systolic function.  · The estimated ejection fraction is 34%.  · Left ventricular diastolic dysfunction.  · Strain study demonstrates apical sparing with normal G LS at the apex a -20 and then in the midportion bases -10- 7  · Strain study is compatible with amyloid but not diagnostic of amyloid      CT Chest Abdomen Pelvis Without Contrast (XPD)  Narrative: EXAMINATION:  CT CHEST ABDOMEN PELVIS WITHOUT CONTRAST(XPD)    CLINICAL HISTORY:  n/v, abdo pain, cp;    TECHNIQUE:  Low dose axial images, sagittal and coronal reformations were obtained from the thoracic inlet to the pubic symphysis .  Oral contrast was not administered.    COMPARISON:  CTA chest 10/29/2022, CT abdomen and pelvis 10/26/2022    FINDINGS:  The structures at the base of the neck are remarkable for bilateral low attenuating thyroid nodules measuring up to 1.6 cm within the inferior aspects of the thyroid.  Nonemergent ultrasound could be performed for further evaluation as warranted.  There is a moderate pericardial effusion worsened since the previous examination.  Right central venous catheter tip terminates at the level of the distal SVC/right atrial junction.  The heart is not enlarged.  The thoracic aorta tapers normally noting atherosclerotic calcification along its course.    The airways are patent.  There is patchy ground-glass attenuation throughout the pulmonary parenchyma  bilaterally noting scattered regions of patchy consolidation within the bilateral lower lobes, left greater than right, also along the fissure on the left.  No pneumothorax.  No pleural effusion.  There is bilateral basilar dependent atelectasis.    Allowing for motion artifact, the liver is somewhat high attenuating, correlation with any history of iron overload or amiodarone use.  The spleen is enlarged.  The adrenal glands and pancreas are grossly unremarkable.  The stomach is decompressed noting there is edema throughout the gastric walls, correlation with any history of gastritis.  The gallbladder is surgically absent.  No significant biliary dilation.  No significant abdominal lymphadenopathy.    There is no right hydronephrosis or right nephrolithiasis noting right renal vascular calcification.  The right ureter is unable to be followed in its entirety to the urinary bladder, no definite calculi seen.  There is left perinephric fat stranding.  There is mild-to-moderate left hydronephrosis.  The left ureter is prominent.  No definite calculi seen along the course of the left ureter.  The urinary bladder is distended noting wall thickening.  The uterus and adnexa are grossly unremarkable.  There is fluid in the pelvis.    There are a few scattered colonic diverticula without inflammation.  There is moderate stool in the colon.  The terminal ileum is unremarkable.  The appendix is unremarkable.  The small bowel is grossly unremarkable.  There is strand-like fluid in the abdomen and pelvis.  No focal organized pelvic fluid collection.    No acute osseous abnormality.  No significant inguinal lymphadenopathy.  No significant axillary lymphadenopathy.  There is diffuse body wall anasarca.  Impression: This report was flagged in Epic as abnormal.    1. In comparison to examination 10/29/2022, pericardial effusion has worsened as well as worsening body wall anasarca and fluid in the pelvis, findings are concerning  for volume overload, correlation is advised.  2. Patchy ground-glass and consolidation throughout the pulmonary parenchyma particularly involving the bilateral lower lobes findings are concerning for edema and superimposed developing infectious process.  Correlation is advised.  3. There is edema throughout the gastric walls, new since the previous exam.  This may be on the basis of volume overload or gastritis.  Correlation recommended.  4. Urinary bladder is mildly distended noting diffuse wall thickening, correlation with urinalysis advised to exclude cystitis.  The differential would potentially include sequela of recently passed calculus however infection is of concern.  Correlation advised.  5. Please see above for several additional findings.    Electronically signed by: Max Mancia MD  Date:    11/11/2022  Time:    18:38  X-Ray Chest AP Portable  Narrative: EXAMINATION:  XR CHEST AP PORTABLE    CLINICAL HISTORY:  Shortness of breath;    TECHNIQUE:  Single frontal view of the chest was performed.    COMPARISON:  November 6, 2022    FINDINGS:  Reconfirmed large-bore right jugular catheter, tip superimposing right atrial soft tissues as before.  Stable enlargement of cardiopericardial silhouette.  Normal pulmonary vasculature.  No focal infiltrates.  No pleural effusions or pneumothorax.  No acute bony findings.  Impression: Stable position of large-bore right jugular catheter and enlargement of cardiopericardial silhouette with no active pulmonary findings and no detrimental changes compared to earlier exam    Electronically signed by: Darius Esquivel  Date:    11/11/2022  Time:    14:41      Labs and Imaging within the last 24 hours listed above were reviewed.       Diet: Diet diabetic [facility]; 2000 Calorie; Renal, [place]; Fluid - 1200mL  Significant LDAs:   IV Access Type: Peripheral and Dialysis Access  Urinary Catheter Indication if present: Patient Does Not Have Urinary Catheter  Other  Lines/Tubes/Drains:    HIGH RISK CONDITION(S):   Patient has a condition that poses threat to life and bodily function: Volume Overload and Respiratory distress    Goals of Care:    Previous admission:  11/6/22  Likely prognosis:  Fair  Code Status: Full Code  Comfort Only: No  Hospice: No  Goals at discharge: remain at home, with physician follow-up    Discharge Planning   TATIANA: 11/16/2022     Code Status: Full Code   Is the patient medically ready for discharge?: No    Reason for patient still in hospital (select all that apply): Patient trending condition and Treatment  Discharge Plan A: Home with family

## 2022-11-14 NOTE — PLAN OF CARE
Pt has NOT yet gone to the [facility] in [place], but they are working on her chair there for T,Th, and Sat. The location is NOT the [facility] location, but is located at: [address], [place] [id number] [phone].    WILLIAM spoke with Adri at [facility] on [facility], and sent her updates at (f) [phone]. Adri requests that CM contact her to let her know when Pt will discharge and need HD.     WILLIAM will follow.    WILL Lozano, LMSW [facility]  [id number]

## 2022-11-14 NOTE — PROGRESS NOTES
[facility] ([place] [address])  Nephrology  Progress Note    Patient Name: Tabby Howard  MRN: [id number]  Admission Date: 11/11/2022  Hospital Length of Stay: 0 days  Attending Provider: Светлана Fiore MD   Primary Care Physician: Primary Doctor No  Principal Problem:Volume overload    Subjective:     Interval History: HD yesterday. Tolerated well, net UF 1.9L. Electrolytes stable.     Review of patient's allergies indicates:   Allergen Reactions    Penicillins Hives     Current Facility-Administered Medications   Medication Frequency    acetaminophen tablet 650 mg Q8H    albuterol inhaler 2 puff Q6H PRN    amLODIPine tablet 10 mg Daily    apixaban tablet 2.5 mg BID    buprenorphine HCL SL tablet 2 mg Q8H PRN    carvediloL tablet 25 mg BID    cefTRIAXone (ROCEPHIN) 1 g/50 mL D5W IVPB Q24H    dextrose 10% bolus 125 mL PRN    dextrose 10% bolus 250 mL PRN    diclofenac sodium 1 % gel 2 g Daily    erythromycin EC tablet 500 mg TID AC    FLUoxetine capsule 20 mg Daily    furosemide tablet 40 mg BID    gabapentin capsule 100 mg TID    glucagon (human recombinant) injection 1 mg PRN    glucose chewable tablet 16 g PRN    glucose chewable tablet 24 g PRN    heparin (porcine) injection 1,000 Units PRN    hydrALAZINE tablet 100 mg Q8H    hydrOXYzine HCL tablet 25 mg TID PRN    insulin aspart U-100 pen 0-5 Units QID (AC + HS) PRN    insulin detemir U-100 pen 5 Units Daily    isosorbide mononitrate 24 hr tablet 120 mg Daily    labetalol 20 mg/4 mL (5 mg/mL) IV syring Q4H PRN    levETIRAcetam tablet 500 mg BID    LIDOcaine 5 % patch 1 patch Q24H    melatonin tablet 6 mg Nightly PRN    methocarbamoL tablet 500 mg TID    metoclopramide HCl injection 5 mg Q8H    ondansetron injection 4 mg Q8H PRN    prochlorperazine injection Soln 5 mg Q6H PRN    sodium chloride 0.9% flush 10 mL PRN       Objective:     Vital Signs (Most Recent):  Temp: 97.6 °F (36.4 °C) (11/14/22 1127)  Pulse: 91  (11/14/22 1142)  Resp: 18 (11/14/22 1127)  BP: (!) 169/89 (11/14/22 1127)  SpO2: (!) 94 % (11/14/22 1127)  O2 Device (Oxygen Therapy): nasal cannula (11/14/22 0832)   Vital Signs (24h Range):  Temp:  [97.6 °F (36.4 °C)-99 °F (37.2 °C)] 97.6 °F (36.4 °C)  Pulse:  [] 91  Resp:  [16-18] 18  SpO2:  [94 %-99 %] 94 %  BP: ()/(55-92) 169/89     Weight: 57.1 kg (125 lb 14.1 oz) (11/12/22 1232)  Body mass index is 23.02 kg/m².  Body surface area is 1.58 meters squared.    I/O last 3 completed shifts:  In: 500 [Other:500]  Out: -     Physical Exam  Vitals and nursing note reviewed.   Constitutional:       General: She is not in acute distress.     Appearance: She is not toxic-appearing or diaphoretic. Ill appearance: Chronically.  HENT:      Head: Normocephalic and atraumatic.      Right Ear: External ear normal.      Left Ear: External ear normal.      Nose: Nose normal.      Mouth/Throat:      Mouth: Mucous membranes are dry.      Pharynx: Oropharynx is clear.   Eyes:      General: No scleral icterus.        Right eye: No discharge.         Left eye: No discharge.      Extraocular Movements: Extraocular movements intact.      Pupils: Pupils are equal, round, and reactive to light.   Cardiovascular:      Rate and Rhythm: Normal rate and regular rhythm.      Pulses: Normal pulses.      Heart sounds: Normal heart sounds.   Pulmonary:      Effort: Pulmonary effort is normal.      Breath sounds: Normal breath sounds.   Abdominal:      General: Bowel sounds are normal.      Tenderness: There is abdominal tenderness (Generalized). There is no guarding or rebound.   Musculoskeletal:      Cervical back: Normal range of motion and neck supple.      Right lower leg: No edema.      Left lower leg: No edema.   Skin:     General: Skin is warm and dry.      Capillary Refill: Capillary refill takes less than 2 seconds.   Neurological:      Mental Status: She is oriented to person, place, and time.       Significant Labs:  CBC:    Recent Labs   Lab 11/14/22  1029   WBC 4.77   RBC 3.40*   HGB 9.6*   HCT 30.1*   *   MCV 89   MCH 28.2   MCHC 31.9*     CMP:   Recent Labs   Lab 11/14/22  1029   *   CALCIUM 8.5*   ALBUMIN 2.6*   PROT 6.0   *   K 3.9   CO2 21*      BUN 16   CREATININE 2.8*   ALKPHOS 195*   ALT 35   AST 19   BILITOT 0.8     All labs within the past 24 hours have been reviewed.           Assessment/Plan:     * Volume overload  UF with dialysis   Improved with HD     ESRD (end stage renal disease)  32 y.o. Black or  Female ESRD-HD M-W-F presents to ED on 11/11/2022 with concerns for gastroparesis.   Nephrology consulted for inpatient ESRD-HD management    Outpatient HD Information:  -Dialysis modality: Hemodialysis  -Outpatient HD unit: [facility]  -Nephrologist: ?  -HD TX days: Monday/Wednesday/Friday, duration of treatment: 4 hrs  -Last HD TX prior to hospital admission: 11/09  -Dialysis access: dialysis catheter   -Residual urine: Anuric   -EDW: 56 kg     Assessment:   - Will provide dialysis tomorrow 11/15 for metabolic clearance and volume management   - Labs reviewed and dialysate to be adjusted to current labs.   - Will obtain OP dialysis records  - Continue to monitor intake and output  - Pre and Post-HD weights   - Please avoid gadolinium, fleets, phos-based laxatives, NSAIDs  - Will evaluate HD requirements Daily    Anemia of ESRD   Recent Labs   Lab 11/12/22  0448 11/13/22  0346 11/14/22  1029   WBC 5.01 4.42 4.77   HGB 10.6* 10.0* 9.6*   HCT 31.8* 30.7* 30.1*   * 144* 133*       - Goal in ESRD is Hgb of 10-11.   - EPO can be administered and dosed per his OP unit upon discharge.    Mineral Bone Disease in ESRD   Lab Results   Component Value Date    .8 (H) 07/08/2022    CALCIUM 8.5 (L) 11/14/2022    PHOS 3.7 11/14/2022       - F/U PO4, Mg, Calcium. And albumin levels.   - Renal diet with protein intake goal 1.5 g/kg/d with 1 L fluid restriction   -  Novasource with meals  - Continue home phos binder   - Daily renal panel so that phos and albumin is monitored daily.     Hypertension    - BP Normal  - No lab stick or BP intake on access site.        Gastroparesis  - defer to primary team         Thank you for your consult. I will follow-up with patient. Please contact us if you have any additional questions.    Stephanie Novak DNP  Nephrology  [facility] ([facility]

## 2022-11-14 NOTE — ASSESSMENT & PLAN NOTE
Patient's FSGs are uncontrolled due to hyperglycemia on current medication regimen.  Last A1c reviewed-   Lab Results   Component Value Date    HGBA1C 6.2 08/24/2022     Most recent fingerstick glucose reviewed-   Recent Labs   Lab 11/13/22  1151 11/13/22  1203 11/13/22  1324 11/13/22  1539   POCTGLUCOSE 71 491* 411* 255*     Current correctional scale  Low  Maintain anti-hyperglycemic dose as follows-   Antihyperglycemics (From admission, onward)    Start     Stop Route Frequency Ordered    11/12/22 0900  insulin detemir U-100 pen 5 Units         -- SubQ Daily 11/11/22 1748    11/11/22 1912  insulin aspart U-100 pen 0-5 Units         -- SubQ Before meals & nightly PRN 11/11/22 1812

## 2022-11-14 NOTE — SUBJECTIVE & OBJECTIVE
Interval History: HD yesterday. Tolerated well, net UF 1.9L. Electrolytes stable.     Review of patient's allergies indicates:   Allergen Reactions    Penicillins Hives     Current Facility-Administered Medications   Medication Frequency    acetaminophen tablet 650 mg Q8H    albuterol inhaler 2 puff Q6H PRN    amLODIPine tablet 10 mg Daily    apixaban tablet 2.5 mg BID    buprenorphine HCL SL tablet 2 mg Q8H PRN    carvediloL tablet 25 mg BID    cefTRIAXone (ROCEPHIN) 1 g/50 mL D5W IVPB Q24H    dextrose 10% bolus 125 mL PRN    dextrose 10% bolus 250 mL PRN    diclofenac sodium 1 % gel 2 g Daily    erythromycin EC tablet 500 mg TID AC    FLUoxetine capsule 20 mg Daily    furosemide tablet 40 mg BID    gabapentin capsule 100 mg TID    glucagon (human recombinant) injection 1 mg PRN    glucose chewable tablet 16 g PRN    glucose chewable tablet 24 g PRN    heparin (porcine) injection 1,000 Units PRN    hydrALAZINE tablet 100 mg Q8H    hydrOXYzine HCL tablet 25 mg TID PRN    insulin aspart U-100 pen 0-5 Units QID (AC + HS) PRN    insulin detemir U-100 pen 5 Units Daily    isosorbide mononitrate 24 hr tablet 120 mg Daily    labetalol 20 mg/4 mL (5 mg/mL) IV syring Q4H PRN    levETIRAcetam tablet 500 mg BID    LIDOcaine 5 % patch 1 patch Q24H    melatonin tablet 6 mg Nightly PRN    methocarbamoL tablet 500 mg TID    metoclopramide HCl injection 5 mg Q8H    ondansetron injection 4 mg Q8H PRN    prochlorperazine injection Soln 5 mg Q6H PRN    sodium chloride 0.9% flush 10 mL PRN       Objective:     Vital Signs (Most Recent):  Temp: 97.6 °F (36.4 °C) (11/14/22 1127)  Pulse: 91 (11/14/22 1142)  Resp: 18 (11/14/22 1127)  BP: (!) 169/89 (11/14/22 1127)  SpO2: (!) 94 % (11/14/22 1127)  O2 Device (Oxygen Therapy): nasal cannula (11/14/22 0832)   Vital Signs (24h Range):  Temp:  [97.6 °F (36.4 °C)-99 °F (37.2 °C)] 97.6 °F (36.4 °C)  Pulse:  [] 91  Resp:  [16-18] 18  SpO2:  [94 %-99 %] 94 %  BP: ()/(55-92) 169/89      Weight: 57.1 kg (125 lb 14.1 oz) (11/12/22 1232)  Body mass index is 23.02 kg/m².  Body surface area is 1.58 meters squared.    I/O last 3 completed shifts:  In: 500 [Other:500]  Out: -     Physical Exam  Vitals and nursing note reviewed.   Constitutional:       General: She is not in acute distress.     Appearance: She is not toxic-appearing or diaphoretic. Ill appearance: Chronically.  HENT:      Head: Normocephalic and atraumatic.      Right Ear: External ear normal.      Left Ear: External ear normal.      Nose: Nose normal.      Mouth/Throat:      Mouth: Mucous membranes are dry.      Pharynx: Oropharynx is clear.   Eyes:      General: No scleral icterus.        Right eye: No discharge.         Left eye: No discharge.      Extraocular Movements: Extraocular movements intact.      Pupils: Pupils are equal, round, and reactive to light.   Cardiovascular:      Rate and Rhythm: Normal rate and regular rhythm.      Pulses: Normal pulses.      Heart sounds: Normal heart sounds.   Pulmonary:      Effort: Pulmonary effort is normal.      Breath sounds: Normal breath sounds.   Abdominal:      General: Bowel sounds are normal.      Tenderness: There is abdominal tenderness (Generalized). There is no guarding or rebound.   Musculoskeletal:      Cervical back: Normal range of motion and neck supple.      Right lower leg: No edema.      Left lower leg: No edema.   Skin:     General: Skin is warm and dry.      Capillary Refill: Capillary refill takes less than 2 seconds.   Neurological:      Mental Status: She is oriented to person, place, and time.       Significant Labs:  CBC:   Recent Labs   Lab 11/14/22  1029   WBC 4.77   RBC 3.40*   HGB 9.6*   HCT 30.1*   *   MCV 89   MCH 28.2   MCHC 31.9*     CMP:   Recent Labs   Lab 11/14/22  1029   *   CALCIUM 8.5*   ALBUMIN 2.6*   PROT 6.0   *   K 3.9   CO2 21*      BUN 16   CREATININE 2.8*   ALKPHOS 195*   ALT 35   AST 19   BILITOT 0.8     All labs  within the past 24 hours have been reviewed.

## 2022-11-14 NOTE — PLAN OF CARE
Nahid Pruitt - Intensive Care ([facility]-)  Initial Discharge Assessment       Primary Care Provider: [address] [place] [address]. Phone: [phone]. Fax: [phone].    Admission Diagnosis: Gastroparesis [K31.84]  ESRD (end stage renal disease) on dialysis [N18.6, Z99.2]  Intractable abdominal pain [R10.9]  Chest pain [R07.9]  Medication adverse effect [T50.905A]  Hypervolemia, unspecified hypervolemia type [E87.70]    Admission Date: 11/11/2022  Expected Discharge Date: 11/16/2022    Discharge Barriers Identified: None    Payor: MEDICAID / Plan: [place] / Product Type: Managed Medicaid /     Extended Emergency Contact Information  Primary Emergency Contact: Garcia Howard  Mobile Phone: [phone]  Relation: Father  Preferred language: English   needed? No  Secondary Emergency Contact: LeeTanya  Mobile Phone: [phone]  Relation: Step parent  Preferred language: English   needed? No    Discharge Plan A: Home with family  Discharge Plan B: Home with family      [facility]  [address]  [facility] [address]  Phone: [phone] Fax: [phone]      Initial Assessment (most recent)       Adult Discharge Assessment - 11/14/22 1051          Discharge Assessment    Assessment Type Discharge Planning Assessment     Confirmed/corrected address, phone number and insurance Yes     Confirmed Demographics Correct on Facesheet     Source of Information patient     If unable to respond/provide information was family/caregiver contacted? Yes     Contact Name/Number Father Victor M Howard [phone] and Step mother Tanya Howard [phone]     Does patient/caregiver understand observation status Yes     Communicated TATIANA with patient/caregiver Yes     Reason For Admission Volume overload     Lives With parent(s);child(tammy), dependent     Facility Arrived From: Home     Do you expect to return to your current living situation? Yes      Do you have help at home or someone to help you manage your care at home? Yes     Who are your caregiver(s) and their phone number(s)? Father Victor M Howard [phone] and Step mother Tanya Howard [phone]     Prior to hospitilization cognitive status: Alert/Oriented     Current cognitive status: Alert/Oriented     Walking or Climbing Stairs Difficulty ambulation difficulty, requires equipment     Mobility Management RW     Dressing/Bathing Difficulty none     Home Accessibility wheelchair accessible     Home Layout Able to live on 1st floor     Equipment Currently Used at Home walker, rolling     Readmission within 30 days? Yes     Patient currently being followed by outpatient case management? No     Do you currently have service(s) that help you manage your care at home? No     Do you take prescription medications? Yes     Do you have prescription coverage? Yes     Coverage Medicaid     Do you have any problems affording any of your prescribed medications? TBD     Is the patient taking medications as prescribed? yes     Who is going to help you get home at discharge? Father Victor M Howard [phone] and Step mother Tanya Howard [phone]     How do you get to doctors appointments? family or friend will provide     Are you on dialysis? Yes     Dialysis Name and Scheduled days [address] [place] [address] T, Th, Sat - NEW PATIENT here; previously at [facility] on [address] M, W, F     Do you take coumadin? No     Discharge Plan A Home with family     Discharge Plan B Home with family     DME Needed Upon Discharge  none     Discharge Plan discussed with: Patient;Parent(s)     Name(s) and Number(s) Father Victor M Howard [phone] and Step mother Tanya Howard [phone]     Discharge Barriers Identified None     SDOH Housing/Economic Concerns     Housing/Economic Concerns Yes     Housing/Economic Concerns Inadequate Housing   Pt has a history of homelessness; says she's hopes to stay with her parents in  [place], [place], where she currently lives       Physical Activity    On average, how many days per week do you engage in moderate to strenuous exercise (like a brisk walk)? 0 days     On average, how many minutes do you engage in exercise at this level? 0 min        Housing Stability    In the last 12 months, was there a time when you did not have a steady place to sleep or slept in a shelter (including now)? Yes        Transportation Needs    In the past 12 months, has lack of transportation kept you from medical appointments or from getting medications? Yes     In the past 12 months, has lack of transportation kept you from meetings, work, or from getting things needed for daily living? Yes        Social Connections    In a typical week, how many times do you talk on the phone with family, friends, or neighbors? More than three times a week     How often do you get together with friends or relatives? More than three times a week        Alcohol Use    Q1: How often do you have a drink containing alcohol? Never     Q2: How many drinks containing alcohol do you have on a typical day when you are drinking? Patient does not drink     Q3: How often do you have six or more drinks on one occasion? Never        Relationship/Environment    Name(s) of Who Lives With Patient Father Victor M Howard [phone] and Step mother Tanya Howard [phone]                   Pt is currently living in a ground floor apartment with her father and stepmother in [place], [place] (Address in Demographics). Pt's 3 minor children also live in the home and are currently being cared for by her parents.     Pt uses a rolling walker in the home, but is mostly independent with her ADL's. Pt has been going to [facility] on [address] in [place] M[place]W,F, but has a new HD chair set up with [facility] on T, Th and Sat (she has not yet gone to this location, and was hospitalized prior to her first dialysis there). SW updated [facility] of Pt's hospitalization.  Pt and her parents state she has not recently missed dialysis. Pt's parents have been providing transportation to dialysis and will provide transportation home.     Pt has never had HH, and is not on Coumadin.     SW will follow for discharge planning needs.

## 2022-11-14 NOTE — ASSESSMENT & PLAN NOTE
32 y.o. Black or  Female ESRD-HD M-W-F presents to ED on 11/11/2022 with concerns for gastroparesis.   Nephrology consulted for inpatient ESRD-HD management    Outpatient HD Information:  -Dialysis modality: Hemodialysis  -Outpatient HD unit: [facility]  -Nephrologist: ?  -HD TX days: Monday/Wednesday/Friday, duration of treatment: 4 hrs  -Last HD TX prior to hospital admission: 11/09  -Dialysis access: dialysis catheter   -Residual urine: Anuric   -EDW: 56 kg     Assessment:   - Will provide dialysis tomorrow 11/15 for metabolic clearance and volume management   - Labs reviewed and dialysate to be adjusted to current labs.   - Will obtain OP dialysis records  - Continue to monitor intake and output  - Pre and Post-HD weights   - Please avoid gadolinium, fleets, phos-based laxatives, NSAIDs  - Will evaluate HD requirements Daily    Anemia of ESRD   Recent Labs   Lab 11/12/22  0448 11/13/22  0346 11/14/22  1029   WBC 5.01 4.42 4.77   HGB 10.6* 10.0* 9.6*   HCT 31.8* 30.7* 30.1*   * 144* 133*       - Goal in ESRD is Hgb of 10-11.   - EPO can be administered and dosed per his OP unit upon discharge.    Mineral Bone Disease in ESRD   Lab Results   Component Value Date    .8 (H) 07/08/2022    CALCIUM 8.5 (L) 11/14/2022    PHOS 3.7 11/14/2022       - F/U PO4, Mg, Calcium. And albumin levels.   - Renal diet with protein intake goal 1.5 g/kg/d with 1 L fluid restriction   - Novasource with meals  - Continue home phos binder   - Daily renal panel so that phos and albumin is monitored daily.     Hypertension    - BP Normal  - No lab stick or BP intake on access site.

## 2022-11-14 NOTE — ASSESSMENT & PLAN NOTE
Abdominal pain   Epigastric pain  Nausea and vomiting  Gastroparesis  Multiple admissions for similar symptomology per chart review. Misses HD sessions.  CT chest abdomen pelvis notable for findings concerning for severe volume overload; likely etiology of her symptoms.  Supportive care with antiemetics prn  Avoid opioids; Use Multimodal pain control with Tylenol, gabapentin, Voltaren gel, lidocaine patch, buprenorphine, Robaxin  Advance diet as tolerated  QTC monitoring  HD for volume removal; expect improvement with volume optimization

## 2022-11-14 NOTE — PLAN OF CARE
Problem: Adult Inpatient Plan of Care  Goal: Plan of Care Review  Outcome: Ongoing, Progressing     Problem: Skin Injury Risk Increased  Goal: Skin Health and Integrity  Outcome: Ongoing, Progressing     Problem: Diabetes Comorbidity  Goal: Blood Glucose Level Within Targeted Range  Outcome: Ongoing, Progressing     Problem: Infection  Goal: Absence of Infection Signs and Symptoms  Outcome: Ongoing, Progressing     Problem: Device-Related Complication Risk (Hemodialysis)  Goal: Safe, Effective Therapy Delivery  Outcome: Ongoing, Progressing     Problem: Hemodynamic Instability (Hemodialysis)  Goal: Effective Tissue Perfusion  Outcome: Ongoing, Progressing     Problem: Infection (Hemodialysis)  Goal: Absence of Infection Signs and Symptoms  Outcome: Ongoing, Progressing     Problem: Fall Injury Risk  Goal: Absence of Fall and Fall-Related Injury  Outcome: Ongoing, Progressing

## 2022-11-14 NOTE — PROGRESS NOTES
11/14/22 0501   Vital Signs   Temp 98 °F (36.7 °C)   Temp src Oral   Pulse 78   BP (!) 99/59   MAP (mmHg) 75   BP Location Left arm   BP Method Automatic   Patient Position Lying   Pre-Hemodialysis Assessment   Treatment Status Started   Gross Bleach Negative Yes   Machine Number 17   Alarms Verified Yes   pH 7   Machine Temperature 97.7 °F (36.5 °C)   Dialyzer F-160   Machine Conductivity 14   Meter Conductivity 14   Dialysate Na (mEq/L) 140   Dialysate K (mEq/L) 2   Dialysate CA (mEq/L) 2.5   Dialysate HCO3 (mEq/L) 30   Prime Ordered (mL) 500 mL   Pre-Hemodialysis Comments pt stable for HD TX   During Hemodialysis Assessment   Blood Flow Rate (mL/min) 400 mL/min   Dialysate Flow Rate (mL/min) 800 ml/min   Ultrafiltration Rate (mL/Hr) 1000 mL/Hr   Arteriovenous Lines Secure Yes   Arterial Pressure (mmHg) -120 mmHg   Venous Pressure (mmHg) 80   TMP 90   Venous Line in Air Detector Yes   Intake (mL) 250 mL   Intra-Hemodialysis Comments TX started; lines reversed due to high arterial pres.       Bedside HD TX started; access secured; Flush lines double clamped; orders verified; pt stable at this time.

## 2022-11-14 NOTE — PROGRESS NOTES
[facility] - Intensive Care ([place] [address])  [facility] Medicine  Telemedicine Progress Note    Patient Name: Tabby Howard  MRN: [id number]  Patient Class: OP- Observation   Admission Date: 11/11/2022  Length of Stay: 0 days  Attending Physician: Светлана Fiore MD  Primary Care Provider: Primary Doctor No      Subjective:     Principal Problem:Volume overload    HPI:  Tabby Howard is a 31 y/o F w/ hx of ESRD on HD, DM, HFpEF, GERD, suspected gastroparesis, sickle cell trait, HTN, with a recent episode of C diff who presents to the emergency department with complaints of generalized abdominal pain with nausea and vomiting, diffuse back pain, and mid-sternal chest pain that began at 6 in the morning.  Patient is poor historian; suspect due to grogginess following administration of IV Dilaudid in ED. she is unable to clearly engage in conversation regarding symptomology following recent discharge from hospital (admitted from 10/29-11/01).  Upon review of medical records, patient with multiple admissions for similar symptoms.  When asked specifically, she notes that emesis nonbloody, nonbilious; notes 3 episodes at home prior to presentation.  Describes abdominal pain as generalized ache.  In terms of her chest pain, she notes tightness which is epigastric.  She also endorses headache, lightheadedness, dizziness.  Her visual changes are (left eye haziness) stable.  Her last dialysis session was on Wednesday; she is unable to comment if she completed full session or if any volume removed.  She denies recent THC use.  She notes adherence to her medication regimen.      Overview/Hospital Course:  Lipase within normal limits.  For completeness, CT chest abdomen pelvis without contrast obtained.  In comparison to prior, notable for worsening pericardial effusion as well as worsening body wall anasarca and fluid in the pelvis concerning for volume overload.  Edema throughout the gastric walls was new since prior exam,  likely due to volume overload in setting of presentation.  Patient with improvement of symptoms following HD initiation.      Telemedicine  This service was provided by [facility].    Patient was transferred to [facility] on:  11/13/2022    Chief Complaint   Patient presents with    Generalized Body Aches     Pt reporting generalized body pain. Last dialysis was Wednesday.      The patient location is: [facility]/[id number] A   Admitted 11/11/2022  1:12 PM    Interval History / Events Overnight:   The patient is able to provide adequate history. Additional history was obtained from past medical records. On Call Provider contacted about hyperglycemia  Patient complains of abdominal and back pain. Symptoms have been unchanged since yesterday. Associated symptoms include: shortness of breath on exertion, fatigue, and malaise. Symptoms are stable.     Lab test(s) reviewed: H&H down-trending; ferritin elevated    Review of Systems   Constitutional:  Negative for fever.   Respiratory:  Positive for shortness of breath.    Gastrointestinal:  Negative for vomiting.     Objective:     Vital Signs (Most Recent):  Temp: 98.9 °F (37.2 °C) (11/13/22 1050)  Pulse: 100 (11/13/22 1136)  Resp: 15 (11/13/22 1016)  BP: (!) 142/77 (11/13/22 1050)  SpO2: (!) 93 % (11/13/22 0749)   Vital Signs (24h Range):  Temp:  [97.6 °F (36.4 °C)-98.9 °F (37.2 °C)] 98.9 °F (37.2 °C)  Pulse:  [] 100  Resp:  [15-18] 15  SpO2:  [91 %-95 %] 93 %  BP: (111-152)/(66-87) 142/77     Weight: 57.1 kg (125 lb 14.1 oz)  Body mass index is 23.02 kg/m².    Intake/Output Summary (Last 24 hours) at 11/13/2022 1233  Last data filed at 11/13/2022 1050  Gross per 24 hour   Intake 500 ml   Output --   Net 500 ml      Physical Exam  Constitutional:       General: She is not in acute distress.     Appearance: Normal appearance.   Eyes:      General: Lids are normal. No scleral icterus.        Right eye: No discharge.         Left eye: No discharge.       Conjunctiva/sclera: Conjunctivae normal.      Comments: Poor vision in both eyes   Neck:      Trachea: Phonation normal.   Cardiovascular:      Rate and Rhythm: Tachycardia present.      Comments: Monitor / Vital signs reviewed at time of visit  Pulmonary:      Effort: Pulmonary effort is normal. No tachypnea, accessory muscle usage or respiratory distress.   Abdominal:      General: There is no distension.   Neurological:      Mental Status: She is alert. She is not disoriented.   Psychiatric:         Attention and Perception: Attention normal.         Mood and Affect: Affect is tearful.         Behavior: Behavior is cooperative.       Significant Labs:   Recent Labs   Lab 07/22/22  1653 08/24/22  0218   HGBA1C 6.0* 6.2     Recent Labs   Lab 11/13/22  1203 11/13/22  1324 11/13/22  1539   POCTGLUCOSE 491* 411* 255*     Recent Labs   Lab 11/11/22  1402 11/12/22 0448 11/13/22  0346   WBC 6.41 5.01 4.42   HGB 11.3* 10.6* 10.0*   HCT 32.7* 31.8* 30.7*   * 145* 144*     Recent Labs   Lab 11/11/22  1402 11/12/22 0448 11/13/22  0346   GRAN 73.5*  4.7 71.6  3.6 67.1  3.0   LYMPH 13.6*  0.9* 18.4  0.9* 19.9  0.9*   MONO 10.6  0.7 8.2  0.4 9.3  0.4   EOS 0.1 0.1 0.1     Recent Labs   Lab 11/11/22  1402 11/12/22 0448 11/13/22  0346   * 135* 134*   K 4.1 5.0 4.7   CL 98 100 104   CO2 23 23 21*   BUN 28* 34* 24*   CREATININE 4.0* 4.7* 3.8*   GLU 65* 191* 292*   CALCIUM 9.6 8.5* 8.1*   ALBUMIN 3.0* 2.3* 2.3*   MG  --  1.9 1.9   PHOS  --  5.4* 5.1*     Recent Labs   Lab 11/07/22  0349 11/11/22  1402 11/11/22  1950 11/12/22 0448 11/13/22  0346   ALKPHOS  --  263*  --  207* 192*   ALT  --  58*  --  40 44   AST  --  33  --  25 29   PROT  --  6.7  --  5.4* 5.4*   BILITOT  --  1.5*  --  1.1* 0.7   LIPASE  --   --  17  --   --    *  --   --   --   --      Recent Labs   Lab 11/11/22  1402   TROPONINI 0.039*   BNP 3,578*     Recent Labs   Lab 08/23/22  1155 10/06/22  1617 10/26/22  1235 10/29/22  1431  11/07/22  0349 11/11/22  1402   LACTATE  --  0.7  --  0.7  --  0.9   FERRITIN 14,369*  --  3,204*  --  4,588*  --      Results for orders placed or performed during the hospital encounter of 02/27/20   Vitamin D   Result Value Ref Range    Vit D, 25-Hydroxy 11 (L) 30 - 96 ng/mL     SARS-CoV-2 RNA, Amplification, Qual (no units)   Date Value   11/06/2022 Negative   10/26/2022 Negative   10/16/2022 Negative   08/23/2022 Negative   08/04/2022 Negative   07/28/2022 Negative   07/07/2022 Negative   05/28/2022 Negative   09/10/2020 Negative     POC Rapid COVID (no units)   Date Value   12/20/2021 Negative   04/23/2021 Negative   12/13/2020 Negative       ECG Results    None         Results for orders placed during the hospital encounter of 10/26/22    Echo Saline Bubble? No    Interpretation Summary  · The left ventricular global longitudinal strain is --11.45%%.  · Moderate concentric hypertrophy and moderately decreased systolic function.  · The estimated ejection fraction is 34%.  · Left ventricular diastolic dysfunction.  · Strain study demonstrates apical sparing with normal G LS at the apex a -20 and then in the midportion bases -10- 7  · Strain study is compatible with amyloid but not diagnostic of amyloid      CT Chest Abdomen Pelvis Without Contrast (XPD)  Narrative: EXAMINATION:  CT CHEST ABDOMEN PELVIS WITHOUT CONTRAST(XPD)    CLINICAL HISTORY:  n/v, abdo pain, cp;    TECHNIQUE:  Low dose axial images, sagittal and coronal reformations were obtained from the thoracic inlet to the pubic symphysis .  Oral contrast was not administered.    COMPARISON:  CTA chest 10/29/2022, CT abdomen and pelvis 10/26/2022    FINDINGS:  The structures at the base of the neck are remarkable for bilateral low attenuating thyroid nodules measuring up to 1.6 cm within the inferior aspects of the thyroid.  Nonemergent ultrasound could be performed for further evaluation as warranted.  There is a moderate pericardial effusion worsened  since the previous examination.  Right central venous catheter tip terminates at the level of the distal SVC/right atrial junction.  The heart is not enlarged.  The thoracic aorta tapers normally noting atherosclerotic calcification along its course.    The airways are patent.  There is patchy ground-glass attenuation throughout the pulmonary parenchyma bilaterally noting scattered regions of patchy consolidation within the bilateral lower lobes, left greater than right, also along the fissure on the left.  No pneumothorax.  No pleural effusion.  There is bilateral basilar dependent atelectasis.    Allowing for motion artifact, the liver is somewhat high attenuating, correlation with any history of iron overload or amiodarone use.  The spleen is enlarged.  The adrenal glands and pancreas are grossly unremarkable.  The stomach is decompressed noting there is edema throughout the gastric walls, correlation with any history of gastritis.  The gallbladder is surgically absent.  No significant biliary dilation.  No significant abdominal lymphadenopathy.    There is no right hydronephrosis or right nephrolithiasis noting right renal vascular calcification.  The right ureter is unable to be followed in its entirety to the urinary bladder, no definite calculi seen.  There is left perinephric fat stranding.  There is mild-to-moderate left hydronephrosis.  The left ureter is prominent.  No definite calculi seen along the course of the left ureter.  The urinary bladder is distended noting wall thickening.  The uterus and adnexa are grossly unremarkable.  There is fluid in the pelvis.    There are a few scattered colonic diverticula without inflammation.  There is moderate stool in the colon.  The terminal ileum is unremarkable.  The appendix is unremarkable.  The small bowel is grossly unremarkable.  There is strand-like fluid in the abdomen and pelvis.  No focal organized pelvic fluid collection.    No acute osseous  abnormality.  No significant inguinal lymphadenopathy.  No significant axillary lymphadenopathy.  There is diffuse body wall anasarca.  Impression: This report was flagged in Epic as abnormal.    1. In comparison to examination 10/29/2022, pericardial effusion has worsened as well as worsening body wall anasarca and fluid in the pelvis, findings are concerning for volume overload, correlation is advised.  2. Patchy ground-glass and consolidation throughout the pulmonary parenchyma particularly involving the bilateral lower lobes findings are concerning for edema and superimposed developing infectious process.  Correlation is advised.  3. There is edema throughout the gastric walls, new since the previous exam.  This may be on the basis of volume overload or gastritis.  Correlation recommended.  4. Urinary bladder is mildly distended noting diffuse wall thickening, correlation with urinalysis advised to exclude cystitis.  The differential would potentially include sequela of recently passed calculus however infection is of concern.  Correlation advised.  5. Please see above for several additional findings.    Electronically signed by: Max Mancia MD  Date:    11/11/2022  Time:    18:38  X-Ray Chest AP Portable  Narrative: EXAMINATION:  XR CHEST AP PORTABLE    CLINICAL HISTORY:  Shortness of breath;    TECHNIQUE:  Single frontal view of the chest was performed.    COMPARISON:  November 6, 2022    FINDINGS:  Reconfirmed large-bore right jugular catheter, tip superimposing right atrial soft tissues as before.  Stable enlargement of cardiopericardial silhouette.  Normal pulmonary vasculature.  No focal infiltrates.  No pleural effusions or pneumothorax.  No acute bony findings.  Impression: Stable position of large-bore right jugular catheter and enlargement of cardiopericardial silhouette with no active pulmonary findings and no detrimental changes compared to earlier exam    Electronically signed by: Darius  Sierra  Date:    11/11/2022  Time:    14:41      Labs and Imaging within the last 24 hours listed above were reviewed.       Diet: Diet diabetic [facility]; 2000 Calorie; [place], Renal  Significant LDAs:   IV Access Type: Peripheral and Dialysis Access  Urinary Catheter Indication if present: Patient Does Not Have Urinary Catheter  Other Lines/Tubes/Drains:    HIGH RISK CONDITION(S):   Patient has a condition that poses threat to life and bodily function: Volume Overload and Respiratory distress    Goals of Care:    Previous admission:  11/6/22  Likely prognosis:  Fair  Code Status: Full Code  Comfort Only: No  Hospice: No  Goals at discharge: remain at home, with physician follow-up    Discharge Planning   TATIANA: 11/16/2022     Code Status: Full Code   Is the patient medically ready for discharge?: No    Reason for patient still in hospital (select all that apply): Patient trending condition and Treatment           Assessment/Plan:      * Volume overload  Abdominal pain   Epigastric pain  Nausea and vomiting  Gastroparesis  Multiple admissions for similar symptomology per chart review. Misses HD sessions.  CT chest abdomen pelvis notable for findings concerning for severe volume overload; likely etiology of her symptoms.  Supportive care with antiemetics prn  Avoid opioids; Use Multimodal pain control with Tylenol, gabapentin, Voltaren gel, lidocaine patch, buprenorphine, Robaxin  Advance diet as tolerated  QTC monitoring  HD for volume removal; expect improvement with volume optimization    Epigastric pain  See abdominal pain  Lipase normal    Vitreous hemorrhage, both eyes  Causing significant vision impairment.  Likely due to neovascularization of diabetes retinopathy    Stable proliferative diabetic retinopathy of both eyes associated with type 2 diabetes mellitus  S/p Avastin IOI on 11/4    Abdominal pain  Likely due to visceral edema.  Bladder scan < 20 mL; Bladder edema noted, possible UTI - treating 3  "ceftriaxone 3 days.  Multimodal pain management  Continue UF per Nephrology.    Anemia, chronic disease  Chronic, stable     Hypertension  Uncontrolled upon admission in the setting of nausea, vomiting, pain  Continue home amlodipine, metoprolol, hydralazine, Imdur  continue to monitor blood pressure trend closely and adjust antihypertensive regimen as clinically indicated and tolerated.    ESRD (end stage renal disease)  Nephrology consulted for HD, appreciate assistance with severe volume overload  Potentially transportation barriers to outpatient HD    Pericardial effusion  May be increasing; continue UF per Nephrology.    Seizure  History of seizures, Continue home Keppra    Gastroparesis  Abdominal pain   Epigastric pain  Nausea and vomiting  Multiple admissions for similar symptomology per chart review but no documented evidence of delayed gastric emptying.  Further, prior hospital medicine notes comment on patient distractibility on examined concern for pain seeking behavior.  As per a prior admission, MD note with "She has very dramatic behavior, likes only IV Dilaudid"  Patient afebrile, hypertensive upon admission, on room air.  Labs stable to improved compared to prior admission; do not indicate acute intra-abdominal pathology.  Lactic acid negative.  Troponin improved from prior.  BNP improved from prior.  EKG without changes suggesting ischemia.  - Erythromycin started for management of suspected gastroparesis; Reglan cautiously used - concern with seizure history  Supportive care, antiemetics prn  Avoid opioids; apparently discussed with patient on admission  Multimodal pain control with Tylenol, gabapentin, buprenorphine, Voltaren gel, lidocaine patch, Robaxin  Advance diet as tolerated  QTC monitoring    Type 2 diabetes mellitus with chronic kidney disease on chronic dialysis, with long-term current use of insulin  Patient's FSGs are uncontrolled due to hyperglycemia on current medication " regimen.  Last A1c reviewed-   Lab Results   Component Value Date    HGBA1C 6.2 08/24/2022     Most recent fingerstick glucose reviewed-   Recent Labs   Lab 11/13/22  1151 11/13/22  1203 11/13/22  1324 11/13/22  1539   POCTGLUCOSE 71 491* 411* 255*     Current correctional scale  Low  Maintain anti-hyperglycemic dose as follows-   Antihyperglycemics (From admission, onward)    Start     Stop Route Frequency Ordered    11/12/22 0900  insulin detemir U-100 pen 5 Units         -- SubQ Daily 11/11/22 1748    11/11/22 1912  insulin aspart U-100 pen 0-5 Units         -- SubQ Before meals & nightly PRN 11/11/22 1812              Active Hospital Problems    Diagnosis  POA    *Volume overload [E87.70]  Yes    Nausea and vomiting [R11.2]  Yes    Epigastric pain [R10.13]  Yes    Stable proliferative diabetic retinopathy of both eyes associated with type 2 diabetes mellitus [E11.3553]  Yes    Vitreous hemorrhage, both eyes [H43.13]  Yes    Abdominal pain [R10.9]  Yes    Anemia, chronic disease [D63.8]  Yes    Hypertension [I10]  Yes    ESRD (end stage renal disease) [N18.6]  Yes    Acute hypoxemic respiratory failure [J96.01]  Yes    Seizure [R56.9]  Yes    Gastroparesis [K31.84]  Yes     Chronic    Pericardial effusion [I31.39]  Yes    Type 2 diabetes mellitus with chronic kidney disease on chronic dialysis, with long-term current use of insulin [E11.22, N18.6, Z99.2, Z79.4]  Not Applicable     Chronic      Resolved Hospital Problems   No resolved problems to display.       Inpatient Medications Prescribed for Management of Current Problems:     Scheduled Meds:    [START ON 11/14/2022] sodium chloride 0.9%   Intravenous Once    acetaminophen  650 mg Oral Q8H    amLODIPine  10 mg Oral Daily    apixaban  2.5 mg Oral BID    carvediloL  25 mg Oral BID    cefTRIAXone (ROCEPHIN) IVPB  1 g Intravenous Q24H    diclofenac sodium  2 g Topical (Top) Daily    erythromycin  500 mg Oral TID AC    FLUoxetine  20 mg  Oral Daily    furosemide  40 mg Oral BID    gabapentin  100 mg Oral TID    hydrALAZINE  100 mg Oral Q8H    insulin detemir U-100  5 Units Subcutaneous Daily    isosorbide mononitrate  120 mg Oral Daily    levETIRAcetam  500 mg Oral BID    LIDOcaine  1 patch Transdermal Q24H    methocarbamoL  500 mg Oral TID    metoclopramide HCl  5 mg Intravenous Q8H     Continuous Infusions:   As Needed: albuterol, buprenorphine HCL, dextrose 10%, dextrose 10%, glucagon (human recombinant), glucose, glucose, heparin (porcine), hydrOXYzine HCL, insulin aspart U-100, labetalol, melatonin, ondansetron, prochlorperazine, sodium chloride 0.9%    VTE Risk Mitigation (From admission, onward)         Ordered     apixaban tablet 2.5 mg  2 times daily         11/13/22 1404     heparin (porcine) injection 1,000 Units  As needed (PRN)         11/12/22 1009     IP VTE HIGH RISK PATIENT  Once         11/11/22 1748     Place sequential compression device  Until discontinued         11/11/22 1748              I have completed this tele-visit without the assistance of a telepresenter.    The attending portion of this evaluation, treatment, and documentation was performed per Светлана Fiore MD via Telemedicine AudioVisual using the secure [employer] software platform with 2 way audio/video. The provider was located off-site and the patient is located in the hospital. The aforementioned video software was utilized to document the relevant history and physical exam. Secure [employer] software platform was used instead of [employer] for this visit.      Светлана Fiore MD  Department of Hospital Medicine   [facility] - Intensive Care ([place]-16)

## 2022-11-15 VITALS
HEART RATE: 92 BPM | WEIGHT: 125.88 LBS | SYSTOLIC BLOOD PRESSURE: 148 MMHG | DIASTOLIC BLOOD PRESSURE: 77 MMHG | TEMPERATURE: 98 F | HEIGHT: 62 IN | RESPIRATION RATE: 18 BRPM | BODY MASS INDEX: 23.17 KG/M2 | OXYGEN SATURATION: 92 %

## 2022-11-15 PROBLEM — Z99.2 ESRD (END STAGE RENAL DISEASE) ON DIALYSIS: Status: ACTIVE | Noted: 2022-07-22

## 2022-11-15 LAB
ALBUMIN SERPL BCP-MCNC: 2.6 G/DL (ref 3.5–5.2)
ALP SERPL-CCNC: 203 U/L (ref 55–135)
ALT SERPL W/O P-5'-P-CCNC: 37 U/L (ref 10–44)
ANION GAP SERPL CALC-SCNC: 7 MMOL/L (ref 8–16)
AST SERPL-CCNC: 27 U/L (ref 10–40)
BASOPHILS # BLD AUTO: 0.02 K/UL (ref 0–0.2)
BASOPHILS NFR BLD: 0.4 % (ref 0–1.9)
BILIRUB SERPL-MCNC: 0.7 MG/DL (ref 0.1–1)
BUN SERPL-MCNC: 33 MG/DL (ref 6–20)
CALCIUM SERPL-MCNC: 8.8 MG/DL (ref 8.7–10.5)
CHLORIDE SERPL-SCNC: 105 MMOL/L (ref 95–110)
CO2 SERPL-SCNC: 19 MMOL/L (ref 23–29)
CREAT SERPL-MCNC: 4.2 MG/DL (ref 0.5–1.4)
DIFFERENTIAL METHOD: ABNORMAL
EOSINOPHIL # BLD AUTO: 0.1 K/UL (ref 0–0.5)
EOSINOPHIL NFR BLD: 2.3 % (ref 0–8)
ERYTHROCYTE [DISTWIDTH] IN BLOOD BY AUTOMATED COUNT: 15.8 % (ref 11.5–14.5)
EST. GFR  (NO RACE VARIABLE): 13.6 ML/MIN/1.73 M^2
GLUCOSE SERPL-MCNC: 260 MG/DL (ref 70–110)
HCT VFR BLD AUTO: 30.2 % (ref 37–48.5)
HGB BLD-MCNC: 9.7 G/DL (ref 12–16)
IMM GRANULOCYTES # BLD AUTO: 0.03 K/UL (ref 0–0.04)
IMM GRANULOCYTES NFR BLD AUTO: 0.6 % (ref 0–0.5)
LYMPHOCYTES # BLD AUTO: 0.6 K/UL (ref 1–4.8)
LYMPHOCYTES NFR BLD: 12.1 % (ref 18–48)
MAGNESIUM SERPL-MCNC: 1.9 MG/DL (ref 1.6–2.6)
MCH RBC QN AUTO: 28.4 PG (ref 27–31)
MCHC RBC AUTO-ENTMCNC: 32.1 G/DL (ref 32–36)
MCV RBC AUTO: 89 FL (ref 82–98)
MONOCYTES # BLD AUTO: 0.4 K/UL (ref 0.3–1)
MONOCYTES NFR BLD: 7.9 % (ref 4–15)
NEUTROPHILS # BLD AUTO: 4 K/UL (ref 1.8–7.7)
NEUTROPHILS NFR BLD: 76.7 % (ref 38–73)
NRBC BLD-RTO: 0 /100 WBC
PHOSPHATE SERPL-MCNC: 5.4 MG/DL (ref 2.7–4.5)
PLATELET # BLD AUTO: 136 K/UL (ref 150–450)
PMV BLD AUTO: 10.1 FL (ref 9.2–12.9)
POCT GLUCOSE: 279 MG/DL (ref 70–110)
POCT GLUCOSE: 287 MG/DL (ref 70–110)
POTASSIUM SERPL-SCNC: 5.4 MMOL/L (ref 3.5–5.1)
PROT SERPL-MCNC: 6 G/DL (ref 6–8.4)
RBC # BLD AUTO: 3.41 M/UL (ref 4–5.4)
SODIUM SERPL-SCNC: 131 MMOL/L (ref 136–145)
WBC # BLD AUTO: 5.2 K/UL (ref 3.9–12.7)

## 2022-11-15 PROCEDURE — 25000003 PHARM REV CODE 250: Performed by: STUDENT IN AN ORGANIZED HEALTH CARE EDUCATION/TRAINING PROGRAM

## 2022-11-15 PROCEDURE — 63600175 PHARM REV CODE 636 W HCPCS: Performed by: INTERNAL MEDICINE

## 2022-11-15 PROCEDURE — 25000003 PHARM REV CODE 250: Performed by: INTERNAL MEDICINE

## 2022-11-15 PROCEDURE — 85025 COMPLETE CBC W/AUTO DIFF WBC: CPT | Performed by: STUDENT IN AN ORGANIZED HEALTH CARE EDUCATION/TRAINING PROGRAM

## 2022-11-15 PROCEDURE — 90935 HEMODIALYSIS ONE EVALUATION: CPT

## 2022-11-15 PROCEDURE — 36415 COLL VENOUS BLD VENIPUNCTURE: CPT | Performed by: STUDENT IN AN ORGANIZED HEALTH CARE EDUCATION/TRAINING PROGRAM

## 2022-11-15 PROCEDURE — 99239 PR HOSPITAL DISCHARGE DAY,>30 MIN: ICD-10-PCS | Mod: 95,,, | Performed by: INTERNAL MEDICINE

## 2022-11-15 PROCEDURE — 63600175 PHARM REV CODE 636 W HCPCS: Performed by: NURSE PRACTITIONER

## 2022-11-15 PROCEDURE — 84100 ASSAY OF PHOSPHORUS: CPT | Performed by: STUDENT IN AN ORGANIZED HEALTH CARE EDUCATION/TRAINING PROGRAM

## 2022-11-15 PROCEDURE — 90935 HEMODIALYSIS ONE EVALUATION: CPT | Mod: ,,, | Performed by: NURSE PRACTITIONER

## 2022-11-15 PROCEDURE — 80053 COMPREHEN METABOLIC PANEL: CPT | Performed by: STUDENT IN AN ORGANIZED HEALTH CARE EDUCATION/TRAINING PROGRAM

## 2022-11-15 PROCEDURE — 99239 HOSP IP/OBS DSCHRG MGMT >30: CPT | Mod: 95,,, | Performed by: INTERNAL MEDICINE

## 2022-11-15 PROCEDURE — 90935 PR HEMODIALYSIS, ONE EVALUATION: ICD-10-PCS | Mod: ,,, | Performed by: NURSE PRACTITIONER

## 2022-11-15 PROCEDURE — 83735 ASSAY OF MAGNESIUM: CPT | Performed by: STUDENT IN AN ORGANIZED HEALTH CARE EDUCATION/TRAINING PROGRAM

## 2022-11-15 RX ORDER — PEN NEEDLE, DIABETIC 30 GX3/16"
NEEDLE, DISPOSABLE MISCELLANEOUS
Qty: 100 EACH | Refills: 12 | Status: SHIPPED | OUTPATIENT
Start: 2022-11-15 | End: 2023-03-10

## 2022-11-15 RX ORDER — INSULIN ASPART 100 [IU]/ML
1-10 INJECTION, SOLUTION INTRAVENOUS; SUBCUTANEOUS
Qty: 3 ML | Refills: 6 | Status: SHIPPED | OUTPATIENT
Start: 2022-11-15 | End: 2023-03-10

## 2022-11-15 RX ORDER — ALBUTEROL SULFATE 90 UG/1
2 AEROSOL, METERED RESPIRATORY (INHALATION) EVERY 6 HOURS PRN
Qty: 18 G | Refills: 3 | Status: SHIPPED | OUTPATIENT
Start: 2022-11-15 | End: 2023-05-11

## 2022-11-15 RX ORDER — FLUOXETINE HYDROCHLORIDE 20 MG/1
20 CAPSULE ORAL DAILY
Qty: 30 CAPSULE | Refills: 11 | Status: ON HOLD | OUTPATIENT
Start: 2022-11-15 | End: 2023-03-13 | Stop reason: HOSPADM

## 2022-11-15 RX ORDER — ONDANSETRON 4 MG/1
4 TABLET, FILM COATED ORAL EVERY 8 HOURS PRN
Qty: 60 TABLET | Refills: 2 | Status: ON HOLD | OUTPATIENT
Start: 2022-11-15 | End: 2023-03-13 | Stop reason: HOSPADM

## 2022-11-15 RX ORDER — ISOSORBIDE MONONITRATE 60 MG/1
120 TABLET, EXTENDED RELEASE ORAL DAILY
Qty: 30 TABLET | Refills: 11 | Status: ON HOLD | OUTPATIENT
Start: 2022-11-15 | End: 2023-03-13 | Stop reason: HOSPADM

## 2022-11-15 RX ORDER — CARVEDILOL 25 MG/1
25 TABLET ORAL 2 TIMES DAILY
Qty: 60 TABLET | Refills: 2 | Status: SHIPPED | OUTPATIENT
Start: 2022-11-15 | End: 2023-02-22 | Stop reason: SDUPTHER

## 2022-11-15 RX ORDER — CLONIDINE 0.2 MG/24H
1 PATCH, EXTENDED RELEASE TRANSDERMAL
Qty: 4 PATCH | Refills: 2 | Status: SHIPPED | OUTPATIENT
Start: 2022-11-15 | End: 2023-02-22 | Stop reason: SDUPTHER

## 2022-11-15 RX ORDER — HYDRALAZINE HYDROCHLORIDE 100 MG/1
100 TABLET, FILM COATED ORAL EVERY 8 HOURS
Qty: 90 TABLET | Refills: 2 | Status: SHIPPED | OUTPATIENT
Start: 2022-11-15 | End: 2023-02-24

## 2022-11-15 RX ORDER — GABAPENTIN 100 MG/1
100 CAPSULE ORAL 2 TIMES DAILY
Qty: 90 CAPSULE | Refills: 2 | Status: ON HOLD | OUTPATIENT
Start: 2022-11-15 | End: 2023-03-13 | Stop reason: HOSPADM

## 2022-11-15 RX ORDER — METHOCARBAMOL 500 MG/1
500 TABLET, FILM COATED ORAL 3 TIMES DAILY
Qty: 90 TABLET | Refills: 1 | Status: ON HOLD | OUTPATIENT
Start: 2022-11-15 | End: 2022-12-14 | Stop reason: HOSPADM

## 2022-11-15 RX ORDER — LEVETIRACETAM 500 MG/1
500 TABLET ORAL 2 TIMES DAILY
Qty: 60 TABLET | Refills: 11 | Status: ON HOLD | OUTPATIENT
Start: 2022-11-15 | End: 2023-03-13 | Stop reason: HOSPADM

## 2022-11-15 RX ORDER — AMLODIPINE BESYLATE 10 MG/1
10 TABLET ORAL DAILY
Qty: 30 TABLET | Refills: 11 | Status: ON HOLD | OUTPATIENT
Start: 2022-11-15 | End: 2023-03-13 | Stop reason: HOSPADM

## 2022-11-15 RX ADMIN — FUROSEMIDE 40 MG: 40 TABLET ORAL at 02:11

## 2022-11-15 RX ADMIN — INSULIN ASPART 3 UNITS: 100 INJECTION, SOLUTION INTRAVENOUS; SUBCUTANEOUS at 05:11

## 2022-11-15 RX ADMIN — HYDRALAZINE HYDROCHLORIDE 100 MG: 25 TABLET, FILM COATED ORAL at 05:11

## 2022-11-15 RX ADMIN — ISOSORBIDE MONONITRATE 120 MG: 60 TABLET, EXTENDED RELEASE ORAL at 02:11

## 2022-11-15 RX ADMIN — CEFTRIAXONE 1 G: 1 INJECTION, SOLUTION INTRAVENOUS at 02:11

## 2022-11-15 RX ADMIN — MUPIROCIN: 20 OINTMENT TOPICAL at 02:11

## 2022-11-15 RX ADMIN — AMLODIPINE BESYLATE 10 MG: 10 TABLET ORAL at 02:11

## 2022-11-15 RX ADMIN — METOCLOPRAMIDE 5 MG: 5 INJECTION, SOLUTION INTRAMUSCULAR; INTRAVENOUS at 05:11

## 2022-11-15 RX ADMIN — LEVETIRACETAM 500 MG: 500 TABLET, FILM COATED ORAL at 02:11

## 2022-11-15 RX ADMIN — INSULIN DETEMIR 5 UNITS: 100 INJECTION, SOLUTION SUBCUTANEOUS at 02:11

## 2022-11-15 RX ADMIN — HYDRALAZINE HYDROCHLORIDE 100 MG: 25 TABLET, FILM COATED ORAL at 03:11

## 2022-11-15 RX ADMIN — HEPARIN SODIUM 1000 UNITS: 1000 INJECTION, SOLUTION INTRAVENOUS; SUBCUTANEOUS at 10:11

## 2022-11-15 RX ADMIN — DICLOFENAC SODIUM 2 G: 10 GEL TOPICAL at 02:11

## 2022-11-15 RX ADMIN — ACETAMINOPHEN 650 MG: 325 TABLET ORAL at 02:11

## 2022-11-15 RX ADMIN — CARVEDILOL 25 MG: 25 TABLET, FILM COATED ORAL at 02:11

## 2022-11-15 RX ADMIN — INSULIN ASPART 3 UNITS: 100 INJECTION, SOLUTION INTRAVENOUS; SUBCUTANEOUS at 02:11

## 2022-11-15 RX ADMIN — FLUOXETINE 20 MG: 20 CAPSULE ORAL at 02:11

## 2022-11-15 RX ADMIN — GABAPENTIN 100 MG: 300 CAPSULE ORAL at 02:11

## 2022-11-15 RX ADMIN — METHOCARBAMOL 500 MG: 500 TABLET ORAL at 02:11

## 2022-11-15 RX ADMIN — ACETAMINOPHEN 650 MG: 325 TABLET ORAL at 05:11

## 2022-11-15 RX ADMIN — APIXABAN 2.5 MG: 2.5 TABLET, FILM COATED ORAL at 02:11

## 2022-11-15 NOTE — ASSESSMENT & PLAN NOTE
Uncontrolled upon admission in the setting of nausea, vomiting, pain  Continue home amlodipine, metoprolol, hydralazine, Imdur  continue to monitor blood pressure trend closely and adjust antihypertensive regimen as clinically indicated and tolerated.  Resume home medications at discharge

## 2022-11-15 NOTE — ASSESSMENT & PLAN NOTE
Causing significant vision impairment.  Likely due to neovascularization of diabetes retinopathy  Follow up with Ophthalmology

## 2022-11-15 NOTE — PROGRESS NOTES
[facility] - Intensive Care ([place] [address])  [facility] Medicine  Telemedicine Progress Note    Patient Name: Tabby Howard  MRN: [id number]  Patient Class: IP- Inpatient   Admission Date: 11/11/2022  Length of Stay: 0 days  Attending Physician: Светлана Fiore MD  Primary Care Provider: Primary Doctor No    Subjective:     Principal Problem:Volume overload    HPI:  Tabby Howard is a 33 y/o F w/ hx of ESRD on HD, DM, HFpEF, GERD, suspected gastroparesis, sickle cell trait, HTN, with a recent episode of C diff who presents to the emergency department with complaints of generalized abdominal pain with nausea and vomiting, diffuse back pain, and mid-sternal chest pain that began at 6 in the morning.  Patient is poor historian; suspect due to grogginess following administration of IV Dilaudid in ED. she is unable to clearly engage in conversation regarding symptomology following recent discharge from hospital (admitted from 10/29-11/01).  Upon review of medical records, patient with multiple admissions for similar symptoms.  When asked specifically, she notes that emesis nonbloody, nonbilious; notes 3 episodes at home prior to presentation.  Describes abdominal pain as generalized ache.  In terms of her chest pain, she notes tightness which is epigastric.  She also endorses headache, lightheadedness, dizziness.  Her visual changes are (left eye haziness) stable.  Her last dialysis session was on Wednesday; she is unable to comment if she completed full session or if any volume removed.  She denies recent THC use.  She notes adherence to her medication regimen.      Overview/Hospital Course:  Lipase within normal limits.  For completeness, CT chest abdomen pelvis without contrast obtained.  In comparison to prior, notable for worsening pericardial effusion as well as worsening body wall anasarca and fluid in the pelvis concerning for volume overload.  Edema throughout the gastric walls was new since prior exam, likely  due to volume overload in setting of presentation.  Patient with improvement of symptoms following HD initiation.      Telemedicine  This service was provided by [facility].    Patient was transferred to [facility] on:  11/13/2022    Chief Complaint   Patient presents with    Generalized Body Aches     Pt reporting generalized body pain. Last dialysis was Wednesday.      The patient location is: [facility]/[address] [address]   Admitted 11/11/2022  1:12 PM    Interval History / Events Overnight:   The patient is able to provide adequate history. Additional history was obtained from past medical records. No significant events reported by Nursing. Tolerating diet  Patient complains of abdominal and back pain. Symptoms have improved since yesterday. Associated symptoms include: fatigue and malaise. Symptoms are decreasing in severity.     Lab test(s) reviewed: Hyperglycemia. H&H down-trending; ferritin elevated    Review of Systems   Constitutional:  Negative for fever.   Respiratory:  Negative for shortness of breath.    Gastrointestinal:  Negative for nausea and vomiting.     Objective:     Vital Signs (Most Recent):  Temp: 97.6 °F (36.4 °C) (11/14/22 1127)  Pulse: 91 (11/14/22 1142)  Resp: 18 (11/14/22 1127)  BP: (!) 169/89 (11/14/22 1127)  SpO2: (!) 94 % (11/14/22 1127)   Vital Signs (24h Range):  Temp:  [97.6 °F (36.4 °C)-99 °F (37.2 °C)] 97.6 °F (36.4 °C)  Pulse:  [] 91  Resp:  [16-18] 18  SpO2:  [94 %-99 %] 94 %  BP: ()/(55-92) 169/89     Weight: 57.1 kg (125 lb 14.1 oz)  Body mass index is 23.02 kg/m².    Intake/Output Summary (Last 24 hours) at 11/14/2022 1400  Last data filed at 11/14/2022 0832  Gross per 24 hour   Intake 600 ml   Output 2500 ml   Net -1900 ml        Physical Exam  Constitutional:       General: She is not in acute distress.     Appearance: Normal appearance.   Eyes:      General: Lids are normal. No scleral icterus.        Right eye: No discharge.         Left eye: No  discharge.      Conjunctiva/sclera: Conjunctivae normal.      Comments: Poor vision in both eyes   Neck:      Trachea: Phonation normal.   Cardiovascular:      Rate and Rhythm: Normal rate.      Comments: Monitor / Vital signs reviewed at time of visit  Pulmonary:      Effort: Pulmonary effort is normal. No tachypnea, accessory muscle usage or respiratory distress.   Abdominal:      General: There is no distension.   Neurological:      Mental Status: She is alert. She is not disoriented.   Psychiatric:         Attention and Perception: Attention normal.         Mood and Affect: Affect normal.         Behavior: Behavior is cooperative.       Significant Labs:   Recent Labs   Lab 07/22/22  1653 08/24/22  0218   HGBA1C 6.0* 6.2       Recent Labs   Lab 11/13/22  1843 11/14/22  1006 11/14/22  1115   POCTGLUCOSE 287* 261* 339*       Recent Labs   Lab 11/12/22 0448 11/13/22 0346 11/14/22  1029   WBC 5.01 4.42 4.77   HGB 10.6* 10.0* 9.6*   HCT 31.8* 30.7* 30.1*   * 144* 133*       Recent Labs   Lab 11/12/22 0448 11/13/22 0346 11/14/22  1029   GRAN 71.6  3.6 67.1  3.0 69.7  3.3   LYMPH 18.4  0.9* 19.9  0.9* 15.7*  0.8*   MONO 8.2  0.4 9.3  0.4 11.7  0.6   EOS 0.1 0.1 0.1       Recent Labs   Lab 11/12/22 0448 11/13/22 0346 11/14/22  1029   * 134* 135*   K 5.0 4.7 3.9    104 104   CO2 23 21* 21*   BUN 34* 24* 16   CREATININE 4.7* 3.8* 2.8*   * 292* 286*   CALCIUM 8.5* 8.1* 8.5*   ALBUMIN 2.3* 2.3* 2.6*   MG 1.9 1.9 1.8   PHOS 5.4* 5.1* 3.7       Recent Labs   Lab 11/11/22  1950 11/12/22 0448 11/13/22 0346 11/14/22  1029   ALKPHOS  --  207* 192* 195*   ALT  --  40 44 35   AST  --  25 29 19   PROT  --  5.4* 5.4* 6.0   BILITOT  --  1.1* 0.7 0.8   LIPASE 17  --   --   --        Recent Labs   Lab 11/11/22  1402   TROPONINI 0.039*   BNP 3,578*       Recent Labs   Lab 08/23/22  1155 10/06/22  1617 10/26/22  1235 10/29/22  1431 11/07/22  0349 11/11/22  1402   LACTATE  --  0.7  --  0.7  --   0.9   FERRITIN 14,369*  --  3,204*  --  4,588*  --        SARS-CoV-2 RNA, Amplification, Qual (no units)   Date Value   11/06/2022 Negative   10/26/2022 Negative   10/16/2022 Negative   08/23/2022 Negative   08/04/2022 Negative   07/28/2022 Negative   07/07/2022 Negative   05/28/2022 Negative   09/10/2020 Negative     POC Rapid COVID (no units)   Date Value   12/20/2021 Negative   04/23/2021 Negative   12/13/2020 Negative       ECG Results    None         Results for orders placed during the hospital encounter of 10/26/22    Echo Saline Bubble? No    Interpretation Summary  · The left ventricular global longitudinal strain is --11.45%%.  · Moderate concentric hypertrophy and moderately decreased systolic function.  · The estimated ejection fraction is 34%.  · Left ventricular diastolic dysfunction.  · Strain study demonstrates apical sparing with normal G LS at the apex a -20 and then in the midportion bases -10- 7  · Strain study is compatible with amyloid but not diagnostic of amyloid      CT Chest Abdomen Pelvis Without Contrast (XPD)  Narrative: EXAMINATION:  CT CHEST ABDOMEN PELVIS WITHOUT CONTRAST(XPD)    CLINICAL HISTORY:  n/v, abdo pain, cp;    TECHNIQUE:  Low dose axial images, sagittal and coronal reformations were obtained from the thoracic inlet to the pubic symphysis .  Oral contrast was not administered.    COMPARISON:  CTA chest 10/29/2022, CT abdomen and pelvis 10/26/2022    FINDINGS:  The structures at the base of the neck are remarkable for bilateral low attenuating thyroid nodules measuring up to 1.6 cm within the inferior aspects of the thyroid.  Nonemergent ultrasound could be performed for further evaluation as warranted.  There is a moderate pericardial effusion worsened since the previous examination.  Right central venous catheter tip terminates at the level of the distal SVC/right atrial junction.  The heart is not enlarged.  The thoracic aorta tapers normally noting atherosclerotic  calcification along its course.    The airways are patent.  There is patchy ground-glass attenuation throughout the pulmonary parenchyma bilaterally noting scattered regions of patchy consolidation within the bilateral lower lobes, left greater than right, also along the fissure on the left.  No pneumothorax.  No pleural effusion.  There is bilateral basilar dependent atelectasis.    Allowing for motion artifact, the liver is somewhat high attenuating, correlation with any history of iron overload or amiodarone use.  The spleen is enlarged.  The adrenal glands and pancreas are grossly unremarkable.  The stomach is decompressed noting there is edema throughout the gastric walls, correlation with any history of gastritis.  The gallbladder is surgically absent.  No significant biliary dilation.  No significant abdominal lymphadenopathy.    There is no right hydronephrosis or right nephrolithiasis noting right renal vascular calcification.  The right ureter is unable to be followed in its entirety to the urinary bladder, no definite calculi seen.  There is left perinephric fat stranding.  There is mild-to-moderate left hydronephrosis.  The left ureter is prominent.  No definite calculi seen along the course of the left ureter.  The urinary bladder is distended noting wall thickening.  The uterus and adnexa are grossly unremarkable.  There is fluid in the pelvis.    There are a few scattered colonic diverticula without inflammation.  There is moderate stool in the colon.  The terminal ileum is unremarkable.  The appendix is unremarkable.  The small bowel is grossly unremarkable.  There is strand-like fluid in the abdomen and pelvis.  No focal organized pelvic fluid collection.    No acute osseous abnormality.  No significant inguinal lymphadenopathy.  No significant axillary lymphadenopathy.  There is diffuse body wall anasarca.  Impression: This report was flagged in Epic as abnormal.    1. In comparison to examination  10/29/2022, pericardial effusion has worsened as well as worsening body wall anasarca and fluid in the pelvis, findings are concerning for volume overload, correlation is advised.  2. Patchy ground-glass and consolidation throughout the pulmonary parenchyma particularly involving the bilateral lower lobes findings are concerning for edema and superimposed developing infectious process.  Correlation is advised.  3. There is edema throughout the gastric walls, new since the previous exam.  This may be on the basis of volume overload or gastritis.  Correlation recommended.  4. Urinary bladder is mildly distended noting diffuse wall thickening, correlation with urinalysis advised to exclude cystitis.  The differential would potentially include sequela of recently passed calculus however infection is of concern.  Correlation advised.  5. Please see above for several additional findings.    Electronically signed by: Max Mancia MD  Date:    11/11/2022  Time:    18:38  X-Ray Chest AP Portable  Narrative: EXAMINATION:  XR CHEST AP PORTABLE    CLINICAL HISTORY:  Shortness of breath;    TECHNIQUE:  Single frontal view of the chest was performed.    COMPARISON:  November 6, 2022    FINDINGS:  Reconfirmed large-bore right jugular catheter, tip superimposing right atrial soft tissues as before.  Stable enlargement of cardiopericardial silhouette.  Normal pulmonary vasculature.  No focal infiltrates.  No pleural effusions or pneumothorax.  No acute bony findings.  Impression: Stable position of large-bore right jugular catheter and enlargement of cardiopericardial silhouette with no active pulmonary findings and no detrimental changes compared to earlier exam    Electronically signed by: Darius Esquivel  Date:    11/11/2022  Time:    14:41      Labs and Imaging within the last 24 hours listed above were reviewed.       Diet: Diet diabetic [facility]; 2000 Calorie; Renal, [place]; Fluid - 1200mL  Significant LDAs:   IV Access  Type: Peripheral and Dialysis Access  Urinary Catheter Indication if present: Patient Does Not Have Urinary Catheter  Other Lines/Tubes/Drains:    HIGH RISK CONDITION(S):   Patient has a condition that poses threat to life and bodily function: Volume Overload and Respiratory distress    Goals of Care:    Previous admission:  11/6/22  Likely prognosis:  Fair  Code Status: Full Code  Comfort Only: No  Hospice: No  Goals at discharge: remain at home, with physician follow-up    Discharge Planning   TATIANA: 11/16/2022     Code Status: Full Code   Is the patient medically ready for discharge?: No    Reason for patient still in hospital (select all that apply): Patient trending condition and Treatment  Discharge Plan A: Home with family        Assessment/Plan:      * Volume overload  Abdominal pain   Epigastric pain  Nausea and vomiting  Gastroparesis  Multiple admissions for similar symptomology per chart review. Misses HD sessions.  CT chest abdomen pelvis notable for findings concerning for severe volume overload; likely etiology of her symptoms.  Supportive care with antiemetics prn  Avoid opioids; Use Multimodal pain control with Tylenol, gabapentin, Voltaren gel, lidocaine patch, buprenorphine, Robaxin  Advance diet as tolerated  QTC monitoring  HD for volume removal; expect improvement with volume optimization    Epigastric pain  See abdominal pain  Lipase normal    Nausea and vomiting  Resolved    Vitreous hemorrhage, both eyes  Causing significant vision impairment.  Likely due to neovascularization of diabetes retinopathy    Stable proliferative diabetic retinopathy of both eyes associated with type 2 diabetes mellitus  S/p Avastin IOI on 11/4  Need follow up with OP Ophthalmolgy    Abdominal pain  Likely due to visceral edema.  Bladder scan < 20 mL; Bladder edema noted, possible UTI - treating 3 ceftriaxone 3 days.  Multimodal pain management  Continue UF per Nephrology.  Nausea and vomiting have  "resolved    Anemia, chronic disease  Chronic    Hypertension  Uncontrolled upon admission in the setting of nausea, vomiting, pain  Continue home amlodipine, metoprolol, hydralazine, Imdur  continue to monitor blood pressure trend closely and adjust antihypertensive regimen as clinically indicated and tolerated.    ESRD (end stage renal disease)  Nephrology consulted for HD, appreciate assistance with severe volume overload  Potentially transportation barriers to outpatient HD  Nephrology plans for HD on 11/15; then either on Wed or Thurs as OP depending on chair time    Acute hypoxemic respiratory failure  Continuing current management with UF  Needs Walk Test    Pericardial effusion  May be increasing; continue UF per Nephrology.    Seizure  History of seizures, Continue home Keppra    Gastroparesis  Abdominal pain   Epigastric pain  Nausea and vomiting  Multiple admissions for similar symptomology per chart review but no documented evidence of delayed gastric emptying.  Further, prior hospital medicine notes comment on patient distractibility on examined concern for pain seeking behavior.  As per a prior admission, MD note with "She has very dramatic behavior, likes only IV Dilaudid"  Patient afebrile, hypertensive upon admission, on room air.  Labs stable to improved compared to prior admission; do not indicate acute intra-abdominal pathology.  Lactic acid negative.  Troponin improved from prior.  BNP improved from prior.  EKG without changes suggesting ischemia.  - Erythromycin started for management of suspected gastroparesis; Reglan cautiously used - concern with seizure history  Supportive care, antiemetics prn  Avoid opioids; apparently discussed with patient on admission  Multimodal pain control with Tylenol, gabapentin, buprenorphine, Voltaren gel, lidocaine patch, Robaxin  Advance diet as tolerated  QTC monitoring  - symptoms resolved; tolerating meals and snacking all day.    Type 2 diabetes mellitus " with chronic kidney disease on chronic dialysis, with long-term current use of insulin  Patient's FSGs are uncontrolled due to hyperglycemia on current medication regimen.  Last A1c reviewed-   Lab Results   Component Value Date    HGBA1C 6.2 08/24/2022     Most recent fingerstick glucose reviewed-   Recent Labs   Lab 11/14/22  1006 11/14/22  1115 11/14/22  1640   POCTGLUCOSE 261* 339* >500*     Current correctional scale  Low  Maintain anti-hyperglycemic dose as follows-   Antihyperglycemics (From admission, onward)    Start     Stop Route Frequency Ordered    11/12/22 0900  insulin detemir U-100 pen 5 Units         -- SubQ Daily 11/11/22 1748    11/11/22 1912  insulin aspart U-100 pen 0-5 Units         -- SubQ Before meals & nightly PRN 11/11/22 1812        Required additional aspart on 11/13 and again on 11/14; Staff are bringing patient fruit cups, crackers, and peanut butter.        Active Hospital Problems    Diagnosis  POA    *Volume overload [E87.70]  Yes    Nausea and vomiting [R11.2]  Yes    Epigastric pain [R10.13]  Yes    Stable proliferative diabetic retinopathy of both eyes associated with type 2 diabetes mellitus [E11.3553]  Yes    Vitreous hemorrhage, both eyes [H43.13]  Yes    Abdominal pain [R10.9]  Yes    Anemia, chronic disease [D63.8]  Yes    Hypertension [I10]  Yes    ESRD (end stage renal disease) [N18.6]  Yes    Acute hypoxemic respiratory failure [J96.01]  Yes    Seizure [R56.9]  Yes    Gastroparesis [K31.84]  Yes     Chronic    Pericardial effusion [I31.39]  Yes    Type 2 diabetes mellitus with chronic kidney disease on chronic dialysis, with long-term current use of insulin [E11.22, N18.6, Z99.2, Z79.4]  Not Applicable     Chronic      Resolved Hospital Problems   No resolved problems to display.       Inpatient Medications Prescribed for Management of Current Problems:     Scheduled Meds:    [START ON 11/15/2022] sodium chloride 0.9%   Intravenous Once    acetaminophen   650 mg Oral Q8H    amLODIPine  10 mg Oral Daily    apixaban  2.5 mg Oral BID    carvediloL  25 mg Oral BID    cefTRIAXone (ROCEPHIN) IVPB  1 g Intravenous Q24H    diclofenac sodium  2 g Topical (Top) Daily    FLUoxetine  20 mg Oral Daily    furosemide  40 mg Oral BID    gabapentin  100 mg Oral TID    hydrALAZINE  100 mg Oral Q8H    insulin detemir U-100  5 Units Subcutaneous Daily    isosorbide mononitrate  120 mg Oral Daily    levETIRAcetam  500 mg Oral BID    LIDOcaine  1 patch Transdermal Q24H    methocarbamoL  500 mg Oral TID    metoclopramide HCl  5 mg Intravenous Q8H    mupirocin   Nasal BID     Continuous Infusions:   As Needed: albuterol, buprenorphine HCL, dextrose 10%, dextrose 10%, glucagon (human recombinant), glucose, glucose, heparin (porcine), hydrOXYzine HCL, insulin aspart U-100, labetalol, melatonin, ondansetron, prochlorperazine, sodium chloride 0.9%    VTE Risk Mitigation (From admission, onward)         Ordered     apixaban tablet 2.5 mg  2 times daily         11/13/22 1404     heparin (porcine) injection 1,000 Units  As needed (PRN)         11/12/22 1009     IP VTE HIGH RISK PATIENT  Once         11/11/22 1748     Place sequential compression device  Until discontinued         11/11/22 1748              I have completed this tele-visit without the assistance of a telepresenter.    The attending portion of this evaluation, treatment, and documentation was performed per Светлана Fiore MD via Telemedicine AudioVisual using the secure Vidyo software platform with 2 way audio/video. The provider was located off-site and the patient is located in the hospital. The aforementioned video software was utilized to document the relevant history and physical exam. Secure eCareManager software platform was used instead of Percolate for this visit.      Светлана Fiore MD  Department of Hospital Medicine   [facility] - Intensive Care ([place]-16)

## 2022-11-15 NOTE — ASSESSMENT & PLAN NOTE
Abdominal pain   Epigastric pain  Nausea and vomiting  Multiple admissions for similar symptomology per chart review. Misses HD sessions.  CT chest abdomen pelvis notable for findings concerning for severe volume overload; likely etiology of her symptoms.  Supportive care with antiemetics prn  Avoid opioids; Use Multimodal pain control with Tylenol, gabapentin, Voltaren gel, lidocaine patch, buprenorphine, Robaxin  Advance diet as tolerated  QTC monitoring  HD for volume removal; expect improvement with volume optimization  New HD chair arranged closer to her current home TTS

## 2022-11-15 NOTE — PLAN OF CARE
WILLIAM faxed updates to Pt's new HD chair [facility] on [address] [facility], (f) [phone], notifying them of Pt's discharge today. Pt's first HD with them will be Thursday.     Pt's family aware of her new HD chair.     WILL Lozano, LMSW [facility]  [id number]

## 2022-11-15 NOTE — ASSESSMENT & PLAN NOTE
Patient's FSGs are uncontrolled due to hyperglycemia on current medication regimen.  Last A1c reviewed-   Lab Results   Component Value Date    HGBA1C 6.2 08/24/2022     Most recent fingerstick glucose reviewed-   Recent Labs   Lab 11/14/22  1640 11/14/22  1936   POCTGLUCOSE >500* 228*     Current correctional scale  Low  Maintain anti-hyperglycemic dose as follows-   Antihyperglycemics (From admission, onward)    Start     Stop Route Frequency Ordered    11/12/22 0900  insulin detemir U-100 pen 5 Units         -- SubQ Daily 11/11/22 1748    11/11/22 1912  insulin aspart U-100 pen 0-5 Units         -- SubQ Before meals & nightly PRN 11/11/22 1812        Required additional aspart on 11/13 and again on 11/14; Staff are bringing patient fruit cups, crackers, and peanut butter.

## 2022-11-15 NOTE — ASSESSMENT & PLAN NOTE
Likely due to visceral edema.  Bladder scan < 20 mL; Bladder edema noted, possible UTI - treating 3 ceftriaxone 3 days.  Multimodal pain management  Continue UF per Nephrology.  Nausea and vomiting have resolved

## 2022-11-15 NOTE — ASSESSMENT & PLAN NOTE
"Abdominal pain   Epigastric pain  Nausea and vomiting  Multiple admissions for similar symptomology per chart review but no documented evidence of delayed gastric emptying.  Further, prior hospital medicine notes comment on patient distractibility on examined concern for pain seeking behavior.  As per a prior admission, MD note with "She has very dramatic behavior, likes only IV Dilaudid"  Patient afebrile, hypertensive upon admission, on room air.  Labs stable to improved compared to prior admission; do not indicate acute intra-abdominal pathology.  Lactic acid negative.  Troponin improved from prior.  BNP improved from prior.  EKG without changes suggesting ischemia.  - Erythromycin started for management of suspected gastroparesis; Reglan cautiously used - concern with seizure history  Supportive care, antiemetics prn  Avoid opioids; apparently discussed with patient on admission  Multimodal pain control with Tylenol, gabapentin, buprenorphine, Voltaren gel, lidocaine patch, Robaxin  Advance diet as tolerated  QTC monitoring  - symptoms resolved; tolerating meals and snacking all day.  "

## 2022-11-15 NOTE — ASSESSMENT & PLAN NOTE
Nephrology consulted for HD, appreciate assistance with severe volume overload  Potentially transportation barriers to outpatient HD  Nephrology plans for HD on 11/15; then Thurs as OP

## 2022-11-15 NOTE — NURSING
Home Oxygen Evaluation    Date Performed: 11/15/2022    1) Patient's Home O2 Sat on room air, while at rest: 96        If O2 sats on room air at rest are 88% or below, patient qualifies. No additional testing needed. Document N/A in steps 2 and 3. If 89% or above, complete steps 2.      2) Patient's O2 Sat on room air while exercising: n/a        If O2 sats on room air while exercising remain 89% or above patient does not qualify, no further testing needed Document N/A in step 3. If O2 sats on room air while exercising are 88% or below, continue to step 3.      3) Patient's O2 Sat while exercising on O2: n/a at n/a LPM         (Must show improvement from #2 for patients to qualify)    If O2 sats improve on oxygen, patient qualifies for portable oxygen. If not, the patient does not qualify.

## 2022-11-15 NOTE — PLAN OF CARE
Problem: Adult Inpatient Plan of Care  Goal: Plan of Care Review  Outcome: Met  Goal: Patient-Specific Goal (Individualized)  Outcome: Met  Goal: Absence of Hospital-Acquired Illness or Injury  Outcome: Met  Goal: Optimal Comfort and Wellbeing  Outcome: Met  Goal: Readiness for Transition of Care  Outcome: Met     Problem: Skin Injury Risk Increased  Goal: Skin Health and Integrity  Outcome: Met     Problem: Diabetes Comorbidity  Goal: Blood Glucose Level Within Targeted Range  Outcome: Met     Problem: Infection  Goal: Absence of Infection Signs and Symptoms  Outcome: Met     Problem: Device-Related Complication Risk (Hemodialysis)  Goal: Safe, Effective Therapy Delivery  Outcome: Met     Problem: Hemodynamic Instability (Hemodialysis)  Goal: Effective Tissue Perfusion  Outcome: Met     Problem: Infection (Hemodialysis)  Goal: Absence of Infection Signs and Symptoms  Outcome: Met     Problem: Fall Injury Risk  Goal: Absence of Fall and Fall-Related Injury  Outcome: Met

## 2022-11-15 NOTE — PLAN OF CARE
[facility] - Intensive Care ([facility]-)  Discharge Final Note    Primary Care Provider: [facility]    Expected Discharge Date: 11/15/2022    Final Discharge Note (most recent)       Final Note - 11/15/22 1029          Final Note    Assessment Type Final Discharge Note     Anticipated Discharge Disposition Home or Self Care     What phone number can be called within the next 1-3 days to see how you are doing after discharge? [phone]     Hospital Resources/Appts/Education Provided Provided patient/caregiver with written discharge plan information;Community resources provided        Post-Acute Status    Post-Acute Authorization Dialysis     Diaylsis Status Set-up Complete/Auth obtained     Coverage Medicaid Healthy Blue     Discharge Delays None known at this time                     Contact Info       [facility] [address] [address] [facility] [address]   Phone: [phone]       Next Steps: Go on 11/17/2022    Instructions: Your first dialysis at this location is Thursday 11/17/22. Please be there for 10:45 AM to sign admission paperwork. Your normal schedule will be Tuesday, Thursday and Saturday at 11:00 AM.    [facility]   Relationship: PCP - General    [facility] [address]   Phone: [phone]       Next Steps: Follow up in 1 week(s)    Instructions: Please call and make a hospital follow up appointment for 7-10 days after discharge. Please bring your discharge paperwork with you to your appointment.            Pt is discharging home with family. Pt's HD chair is set up for T,TH, Sat 11:00 AM at [facility]. WILLIAM notified the Pt and her family, WILLIAM faxed updates and called [facility], notifying them of her discharge.     Pt's PCP is Po, she will call and set up her follow up appointment, information is in her AVS.    Pt has a referral for outpatient case management.     Pt has no other post acute needs and is  cleared for discharge from a case management standpoint.     WILL Lozano, LMSW [facility]  [id number]

## 2022-11-15 NOTE — ASSESSMENT & PLAN NOTE
Patient's FSGs are uncontrolled due to hyperglycemia on current medication regimen.  Last A1c reviewed-   Lab Results   Component Value Date    HGBA1C 6.2 08/24/2022     Most recent fingerstick glucose reviewed-   Recent Labs   Lab 11/14/22  1006 11/14/22  1115 11/14/22  1640   POCTGLUCOSE 261* 339* >500*     Current correctional scale  Low  Maintain anti-hyperglycemic dose as follows-   Antihyperglycemics (From admission, onward)    Start     Stop Route Frequency Ordered    11/12/22 0900  insulin detemir U-100 pen 5 Units         -- SubQ Daily 11/11/22 1748    11/11/22 1912  insulin aspart U-100 pen 0-5 Units         -- SubQ Before meals & nightly PRN 11/11/22 1812        Required additional aspart on 11/13 and again on 11/14; Staff are bringing patient fruit cups, crackers, and peanut butter.

## 2022-11-15 NOTE — CONSULTS
[facility]  Psychology    Treatment Attempt  Patient Name: Tabby Howard  MRN: [id number]     Patient off the floor for dialysis. Psychology will attempt again tomorrow afternoon. Thank you for the consult.       Jason Figueroa, PhD  Dept. of Psychiatry  [facility]

## 2022-11-15 NOTE — PROGRESS NOTES
[facility] STAFF HEMODIALYSIS NOTE     Patient currently on hemodialysis for removal of uremic toxins and volume.     Patient seen and evaluated on hemodialysis, tolerating treatment, see HD flowsheet for vitals and assessments.    Labs have been reviewed and the dialysate bath has been adjusted.       Assessment/Plan:    -Patient seen on HD, tolerating treatment well, w/o complaints   -UF goal of 2L  -Renal diet, if not NPO   -Strict I/O's and daily weights  -Daily renal function panels  -Keep MAP >65 while on HD   -Hgb goal 10-11  -Will continue to follow while inpatient     Stephanie Novak DNP-FNP, C  Nephrology  Pager: [phone]

## 2022-11-15 NOTE — PROGRESS NOTES
HD txt complete.  2L UF removed.  Heparin locks in catheter lumens.  Tolerted well.    Pt transported back to her room in a wheelchair.

## 2022-11-15 NOTE — PROGRESS NOTES
ESRD MWF (off day) HD initiated to RCW catheter.  Tolerated well.      Pt arrived to [facility] in a wheelchair.

## 2022-11-15 NOTE — ASSESSMENT & PLAN NOTE
Nephrology consulted for HD, appreciate assistance with severe volume overload  Potentially transportation barriers to outpatient HD  Nephrology plans for HD on 11/15; then either on Wed or Thurs as OP depending on chair time

## 2022-11-15 NOTE — DISCHARGE SUMMARY
[facility] - [facility] ([address] Medicine  Telemedicine Discharge Summary      Patient Name: Tabby Howard  MRN: [id number]  Patient Class: IP- Inpatient  Admission Date: 11/11/2022  Hospital Length of Stay: 1 days  Discharge Date and Time:  11/15/2022 12:53 PM  Attending Physician: Светлана Fiore MD   Discharging Provider: Светлана Fiore MD  Primary Care Provider: [facility]      HPI:   Tabby Howard is a 31 y/o F w/ hx of ESRD on HD, DM, HFpEF, GERD, suspected gastroparesis, sickle cell trait, HTN, with a recent episode of C diff who presents to the emergency department with complaints of generalized abdominal pain with nausea and vomiting, diffuse back pain, and mid-sternal chest pain that began at 6 in the morning.  Patient is poor historian; suspect due to grogginess following administration of IV Dilaudid in ED. she is unable to clearly engage in conversation regarding symptomology following recent discharge from hospital (admitted from 10/29-11/01).  Upon review of medical records, patient with multiple admissions for similar symptoms.  When asked specifically, she notes that emesis nonbloody, nonbilious; notes 3 episodes at home prior to presentation.  Describes abdominal pain as generalized ache.  In terms of her chest pain, she notes tightness which is epigastric.  She also endorses headache, lightheadedness, dizziness.  Her visual changes are (left eye haziness) stable.  Her last dialysis session was on Wednesday; she is unable to comment if she completed full session or if any volume removed.  She denies recent THC use.  She notes adherence to her medication regimen.      * No surgery found *      Hospital Course:   Lipase within normal limits.  For completeness, CT chest abdomen pelvis without contrast obtained.  In comparison to prior, notable for worsening pericardial effusion as well as worsening body wall anasarca and fluid in the pelvis  concerning for volume overload.  Edema throughout the gastric walls was new since prior exam, likely due to volume overload in setting of presentation.  Patient with improvement of symptoms following HD initiation.     See Problems listed below for additional details of this hospital stay.      Goals of Care Treatment Preferences:  Code Status: Full Code    Health care agent: Step-Mother Tanya Howard / Father Garcia Howard  Health care agent number: [phone] / [phone]        What is most important right now is to focus on remaining as independent as possible.  Accordingly, we have decided that the best plan to meet the patient's goals includes continuing with treatment.      Consults:   Consults (From admission, onward)          Status Ordering Provider     Inpatient consult to Psychology  Once        Provider:  (Not yet assigned)    Completed CLIFFORD RILEY     Inpatient virtual consult to Hospital Medicine  Once        Provider:  (Not yet assigned)    Completed TOMASZ LUTHER     Inpatient consult to Nephrology  Once        Provider:  (Not yet assigned)    Completed TOMASZ LUTHER            * Volume overload  Abdominal pain   Epigastric pain  Nausea and vomiting  Multiple admissions for similar symptomology per chart review. Misses HD sessions.  CT chest abdomen pelvis notable for findings concerning for severe volume overload; likely etiology of her symptoms.  Supportive care with antiemetics prn  Avoid opioids; Use Multimodal pain control with Tylenol, gabapentin, Voltaren gel, lidocaine patch, buprenorphine, Robaxin  Advance diet as tolerated  QTC monitoring  HD for volume removal; expect improvement with volume optimization  New HD chair arranged closer to her current home TTS    Epigastric pain  See abdominal pain  Lipase normal    Nausea and vomiting  Resolved    Vitreous hemorrhage, both eyes  Causing significant vision impairment.  Likely due to neovascularization of diabetes  "retinopathy  Follow up with Ophthalmology    Stable proliferative diabetic retinopathy of both eyes associated with type 2 diabetes mellitus  S/p Avastin IOI on 11/4  Needs follow up with OP Ophthalmolgy    Abdominal pain  Likely due to visceral edema.  Bladder scan < 20 mL; Bladder edema noted, possible UTI - treating 3 ceftriaxone 3 days.  Multimodal pain management  Continue UF per Nephrology.  Nausea and vomiting have resolved    Anemia, chronic disease  Chronic    Hypertension  Uncontrolled upon admission in the setting of nausea, vomiting, pain  Continue home amlodipine, metoprolol, hydralazine, Imdur  continue to monitor blood pressure trend closely and adjust antihypertensive regimen as clinically indicated and tolerated.  Resume home medications at discharge    ESRD (end stage renal disease) on dialysis  Nephrology consulted for HD, appreciate assistance with severe volume overload  Potentially transportation barriers to outpatient HD  Nephrology plans for HD on 11/15; then Thurs as OP     Acute hypoxemic respiratory failure  Continuing current management with UF  Walk Test ordered    Pericardial effusion  May be increasing; continue UF per Nephrology.    Seizure  History of seizures, Continue home Keppra    Gastroparesis - suspected, unconfirmed  Abdominal pain   Epigastric pain  Nausea and vomiting  Multiple admissions for similar symptomology per chart review but no documented evidence of delayed gastric emptying.  Further, prior hospital medicine notes comment on patient distractibility on examined concern for pain seeking behavior.  As per a prior admission, MD note with "She has very dramatic behavior, likes only IV Dilaudid"  Patient afebrile, hypertensive upon admission, on room air.  Labs stable to improved compared to prior admission; do not indicate acute intra-abdominal pathology.  Lactic acid negative.  Troponin improved from prior.  BNP improved from prior.  EKG without changes suggesting " ischemia.  - Erythromycin started for management of suspected gastroparesis; Reglan cautiously used - concern with seizure history  Supportive care, antiemetics prn  Avoid opioids; apparently discussed with patient on admission  Multimodal pain control with Tylenol, gabapentin, buprenorphine, Voltaren gel, lidocaine patch, Robaxin  Advance diet as tolerated  QTC monitoring  - symptoms resolved; tolerating meals and snacking all day.    Type 2 diabetes mellitus with chronic kidney disease on chronic dialysis, with long-term current use of insulin  Patient's FSGs are uncontrolled due to hyperglycemia on current medication regimen.  Last A1c reviewed-   Lab Results   Component Value Date    HGBA1C 6.2 08/24/2022     Most recent fingerstick glucose reviewed-   Recent Labs   Lab 11/14/22  1640 11/14/22  1936   POCTGLUCOSE >500* 228*     Current correctional scale  Low  Maintain anti-hyperglycemic dose as follows-   Antihyperglycemics (From admission, onward)      Start     Stop Route Frequency Ordered    11/12/22 0900  insulin detemir U-100 pen 5 Units         -- SubQ Daily 11/11/22 1748    11/11/22 1912  insulin aspart U-100 pen 0-5 Units         -- SubQ Before meals & nightly PRN 11/11/22 1812          Required additional aspart on 11/13 and again on 11/14; Staff are bringing patient fruit cups, crackers, and peanut butter.      Final Active Diagnoses:    Diagnosis Date Noted POA    PRINCIPAL PROBLEM:  Volume overload [E87.70] 10/04/2022 Yes    Nausea and vomiting [R11.2] 11/11/2022 Yes    Epigastric pain [R10.13] 11/11/2022 Yes    Stable proliferative diabetic retinopathy of both eyes associated with type 2 diabetes mellitus [E11.3553] 11/04/2022 Yes    Vitreous hemorrhage, both eyes [H43.13] 11/04/2022 Yes    Abdominal pain [R10.9] 10/26/2022 Yes    Anemia, chronic disease [D63.8] 10/26/2022 Yes    Hypertension [I10] 07/30/2022 Yes    ESRD (end stage renal disease) on dialysis [N18.6, Z99.2] 07/22/2022 Not Applicable     Acute hypoxemic respiratory failure [J96.01] 07/22/2022 Yes    Seizure [R56.9] 05/29/2022 Yes    Gastroparesis - suspected, unconfirmed [K31.84] 04/12/2022 Yes     Chronic    Pericardial effusion [I31.39] 03/07/2022 Yes    Type 2 diabetes mellitus with chronic kidney disease on chronic dialysis, with long-term current use of insulin [E11.22, N18.6, Z99.2, Z79.4] 10/16/2019 Not Applicable     Chronic      Problems Resolved During this Admission:       Discharged Condition: stable    Disposition: Home or Self Care    Follow Up:   Follow-up Information       [facility] [id number]. Go on 11/17/2022.    Why: Your first dialysis at this location is Thursday 11/17/22. Please be there for 10:45 AM to sign admission paperwork. Your normal schedule will be Tuesday, Thursday and Saturday at 11:00 AM.  Contact information:  [address]  [facility] [address] [phone]             [facility] Follow up in 1 week(s).    Why: Please call and make a hospital follow up appointment for 7-10 days after discharge. Please bring your discharge paperwork with you to your appointment.  Contact information:  [address] [facility] [address] [phone]               [facility] OPHTHALMOLOGY. Schedule an appointment as soon as possible for a visit.    Specialty: Ophthalmology  Why: As previously planned  Contact information:  Eunice Pruitt  [facility] [address]  [phone]                         Future Appointments   Date Time Provider Department Center   12/6/2022  9:45 AM Alfonso Garay MD [facility]     Patient Instructions:      Ambulatory referral/consult to Outpatient Case Management   Referral Priority: Routine Referral Type: Consultation   Referral Reason: Specialty Services Required   Number of Visits Requested: 1     Diet diabetic   Order Comments: AND RENAL WITH FLUID RESTRICTION 1200 ML     Notify your health care provider if you experience any  of the following:  temperature >100.4     Notify your health care provider if you experience any of the following:  persistent nausea and vomiting or diarrhea     Notify your health care provider if you experience any of the following:  difficulty breathing or increased cough     Notify your health care provider if you experience any of the following:  severe persistent headache     Notify your health care provider if you experience any of the following:  persistent dizziness, light-headedness, or visual disturbances     Notify your health care provider if you experience any of the following:  increased confusion or weakness     Activity as tolerated       Significant Diagnostic Studies:   Recent Labs   Lab 07/22/22  1653 08/24/22  0218   HGBA1C 6.0* 6.2     Recent Labs   Lab 11/13/22  0346 11/14/22  1029 11/15/22  0552   WBC 4.42 4.77 5.20   HGB 10.0* 9.6* 9.7*   HCT 30.7* 30.1* 30.2*   * 133* 136*     Recent Labs   Lab 11/13/22 0346 11/14/22  1029 11/15/22  0552   GRAN 67.1  3.0 69.7  3.3 76.7*  4.0   LYMPH 19.9  0.9* 15.7*  0.8* 12.1*  0.6*   MONO 9.3  0.4 11.7  0.6 7.9  0.4   EOS 0.1 0.1 0.1     Recent Labs   Lab 11/13/22 0346 11/14/22  1029 11/15/22  0552   * 135* 131*   K 4.7 3.9 5.4*    104 105   CO2 21* 21* 19*   BUN 24* 16 33*   CREATININE 3.8* 2.8* 4.2*   * 286* 260*   CALCIUM 8.1* 8.5* 8.8   ALBUMIN 2.3* 2.6* 2.6*   MG 1.9 1.8 1.9   PHOS 5.1* 3.7 5.4*     Recent Labs   Lab 11/11/22  1950 11/12/22  0448 11/13/22 0346 11/14/22  1029 11/15/22  0552   ALKPHOS  --    < > 192* 195* 203*   ALT  --    < > 44 35 37   AST  --    < > 29 19 27   PROT  --    < > 5.4* 6.0 6.0   BILITOT  --    < > 0.7 0.8 0.7   LIPASE 17  --   --   --   --     < > = values in this interval not displayed.     Recent Labs   Lab 11/11/22  1402   TROPONINI 0.039*   BNP 3,578*     Recent Labs   Lab 08/23/22  1155 10/06/22  1617 10/26/22  1235 10/29/22  1431 11/07/22  0349 11/11/22  1402   LACTATE  --   0.7  --  0.7  --  0.9   FERRITIN 14,369*  --  3,204*  --  4,588*  --      SARS-CoV-2 RNA, Amplification, Qual (no units)   Date Value   11/06/2022 Negative   10/26/2022 Negative   10/16/2022 Negative   08/23/2022 Negative   08/04/2022 Negative   07/28/2022 Negative   07/07/2022 Negative   05/28/2022 Negative   09/10/2020 Negative     POC Rapid COVID (no units)   Date Value   12/20/2021 Negative   04/23/2021 Negative   12/13/2020 Negative     Results for orders placed during the hospital encounter of 10/26/22    Echo Saline Bubble? No    Interpretation Summary  · The left ventricular global longitudinal strain is --11.45%%.  · Moderate concentric hypertrophy and moderately decreased systolic function.  · The estimated ejection fraction is 34%.  · Left ventricular diastolic dysfunction.  · Strain study demonstrates apical sparing with normal G LS at the apex a -20 and then in the midportion bases -10- 7  · Strain study is compatible with amyloid but not diagnostic of amyloid      CT Chest Abdomen Pelvis Without Contrast (XPD)  Narrative: EXAMINATION:  CT CHEST ABDOMEN PELVIS WITHOUT CONTRAST(XPD)    CLINICAL HISTORY:  n/v, abdo pain, cp;    TECHNIQUE:  Low dose axial images, sagittal and coronal reformations were obtained from the thoracic inlet to the pubic symphysis .  Oral contrast was not administered.    COMPARISON:  CTA chest 10/29/2022, CT abdomen and pelvis 10/26/2022    FINDINGS:  The structures at the base of the neck are remarkable for bilateral low attenuating thyroid nodules measuring up to 1.6 cm within the inferior aspects of the thyroid.  Nonemergent ultrasound could be performed for further evaluation as warranted.  There is a moderate pericardial effusion worsened since the previous examination.  Right central venous catheter tip terminates at the level of the distal SVC/right atrial junction.  The heart is not enlarged.  The thoracic aorta tapers normally noting atherosclerotic calcification along  its course.    The airways are patent.  There is patchy ground-glass attenuation throughout the pulmonary parenchyma bilaterally noting scattered regions of patchy consolidation within the bilateral lower lobes, left greater than right, also along the fissure on the left.  No pneumothorax.  No pleural effusion.  There is bilateral basilar dependent atelectasis.    Allowing for motion artifact, the liver is somewhat high attenuating, correlation with any history of iron overload or amiodarone use.  The spleen is enlarged.  The adrenal glands and pancreas are grossly unremarkable.  The stomach is decompressed noting there is edema throughout the gastric walls, correlation with any history of gastritis.  The gallbladder is surgically absent.  No significant biliary dilation.  No significant abdominal lymphadenopathy.    There is no right hydronephrosis or right nephrolithiasis noting right renal vascular calcification.  The right ureter is unable to be followed in its entirety to the urinary bladder, no definite calculi seen.  There is left perinephric fat stranding.  There is mild-to-moderate left hydronephrosis.  The left ureter is prominent.  No definite calculi seen along the course of the left ureter.  The urinary bladder is distended noting wall thickening.  The uterus and adnexa are grossly unremarkable.  There is fluid in the pelvis.    There are a few scattered colonic diverticula without inflammation.  There is moderate stool in the colon.  The terminal ileum is unremarkable.  The appendix is unremarkable.  The small bowel is grossly unremarkable.  There is strand-like fluid in the abdomen and pelvis.  No focal organized pelvic fluid collection.    No acute osseous abnormality.  No significant inguinal lymphadenopathy.  No significant axillary lymphadenopathy.  There is diffuse body wall anasarca.  Impression: This report was flagged in Epic as abnormal.    1. In comparison to examination 10/29/2022,  pericardial effusion has worsened as well as worsening body wall anasarca and fluid in the pelvis, findings are concerning for volume overload, correlation is advised.  2. Patchy ground-glass and consolidation throughout the pulmonary parenchyma particularly involving the bilateral lower lobes findings are concerning for edema and superimposed developing infectious process.  Correlation is advised.  3. There is edema throughout the gastric walls, new since the previous exam.  This may be on the basis of volume overload or gastritis.  Correlation recommended.  4. Urinary bladder is mildly distended noting diffuse wall thickening, correlation with urinalysis advised to exclude cystitis.  The differential would potentially include sequela of recently passed calculus however infection is of concern.  Correlation advised.  5. Please see above for several additional findings.    Electronically signed by: Max Mancia MD  Date:    11/11/2022  Time:    18:38  X-Ray Chest AP Portable  Narrative: EXAMINATION:  XR CHEST AP PORTABLE    CLINICAL HISTORY:  Shortness of breath;    TECHNIQUE:  Single frontal view of the chest was performed.    COMPARISON:  November 6, 2022    FINDINGS:  Reconfirmed large-bore right jugular catheter, tip superimposing right atrial soft tissues as before.  Stable enlargement of cardiopericardial silhouette.  Normal pulmonary vasculature.  No focal infiltrates.  No pleural effusions or pneumothorax.  No acute bony findings.  Impression: Stable position of large-bore right jugular catheter and enlargement of cardiopericardial silhouette with no active pulmonary findings and no detrimental changes compared to earlier exam    Electronically signed by: Darius Esquivel  Date:    11/11/2022  Time:    14:41    Medications:  Reconciled Home Medications:      Medication List        START taking these medications      insulin detemir U-100 100 unit/mL (3 mL) Inpn pen  Commonly known as: Levemir  "FLEXTOUCH  Inject 5 Units into the skin once daily. Discard pen after 42 days.     methocarbamoL 500 MG Tab  Commonly known as: ROBAXIN  Take 1 tablet (500 mg total) by mouth 3 (three) times daily.     ondansetron 4 MG tablet  Commonly known as: ZOFRAN  Take 1 tablet (4 mg total) by mouth every 8 (eight) hours as needed for Nausea.  Replaces: ondansetron 4 MG Tbdl     pen needle, diabetic 32 gauge x 5/32" Ndle  Commonly known as: BD ULTRA-FINE MAGALIE PEN NEEDLE  Inject into the skin 5 times per day with insulin            CHANGE how you take these medications      apixaban 5 mg Tab  Commonly known as: ELIQUIS  Take 1 tablet (5 mg total) by mouth 2 (two) times daily.  What changed: additional instructions     gabapentin 100 MG capsule  Commonly known as: NEURONTIN  Take 1 capsule (100 mg total) by mouth 2 (two) times daily.  What changed: when to take this            CONTINUE taking these medications      albuterol 90 mcg/actuation inhaler  Commonly known as: PROVENTIL/VENTOLIN HFA  Inhale 2 puffs into the lungs every 6 (six) hours as needed for Wheezing. Rescue     amLODIPine 10 MG tablet  Commonly known as: NORVASC  Take 1 tablet (10 mg total) by mouth once daily.     carvediloL 25 MG tablet  Commonly known as: COREG  Take 1 tablet (25 mg total) by mouth 2 (two) times daily.     cloNIDine 0.2 mg/24 hr td ptwk 0.2 mg/24 hr  Commonly known as: CATAPRES  Place 1 patch onto the skin every 7 days.     FLUoxetine 20 MG capsule  Take 1 capsule (20 mg total) by mouth once daily.     hydrALAZINE 100 MG tablet  Commonly known as: APRESOLINE  Take 1 tablet (100 mg total) by mouth every 8 (eight) hours.     insulin aspart U-100 100 unit/mL (3 mL) Inpn pen  Commonly known as: NovoLOG  Inject 1-10 Units into the skin before meals and at bedtime as needed (Hyperglycemia). Max daily dose 40 units.     isosorbide mononitrate 60 MG 24 hr tablet  Commonly known as: IMDUR  Take 2 tablets (120 mg total) by mouth once daily.   "   levETIRAcetam 500 MG Tab  Commonly known as: KEPPRA  Take 1 tablet (500 mg total) by mouth 2 (two) times daily.            STOP taking these medications      furosemide 40 MG tablet  Commonly known as: LASIX     HYDROcodone-acetaminophen 5-325 mg per tablet  Commonly known as: NORCO     LANTUS U-100 INSULIN 100 unit/mL injection  Generic drug: insulin glargine     ondansetron 4 MG Tbdl  Commonly known as: ZOFRAN-ODT  Replaced by: ondansetron 4 MG tablet     promethazine 25 MG tablet  Commonly known as: PHENERGAN              Indwelling Lines/Drains at time of discharge:   Lines/Drains/Airways       Central Venous Catheter Line  Duration                  Hemodialysis Catheter 07/26/22 1457 right internal jugular 111 days        Time spent on the discharge of patient: 50 minutes  This service was provided by Virtual Visit.   Patient was seen and examined on the date of discharge.  Additional time was spent speaking with consultants and case management, reviewing records, and/or discussing the plan of care with patient/family.  The [facility]  Admitted 11/11/2022  1:12 PM  Present with the patient at the time of the telemed/virtual assessment: Telepresenter    Patient was transferred to [facility] on:  11/13/2022  This document was prepared by chart review and may not directly reflect my personal knowledge of the patient's case, clinical course, or significant events during the hospital stay.    The attending portion of this evaluation, treatment, and documentation was performed per Светална Fiore MD via Telemedicine AudioVisual using the secure [employer] software platform with 2 way audio/video. The provider was located off-site and the patient is located in the hospital. The aforementioned video software was utilized to document the relevant history and physical exam    Светлана Fiore MD  Department of Hospital Medicine  [facility] - Intensive Care ([place])

## 2022-11-16 ENCOUNTER — PATIENT MESSAGE (OUTPATIENT)
Dept: ADMINISTRATIVE | Facility: CLINIC | Age: 32
End: 2022-11-16
Payer: MEDICAID

## 2022-11-16 ENCOUNTER — PATIENT OUTREACH (OUTPATIENT)
Dept: ADMINISTRATIVE | Facility: CLINIC | Age: 32
End: 2022-11-16
Payer: MEDICAID

## 2022-11-16 NOTE — NURSING
Pt left hospital with IV. Charge nurse notified. PT father contacted and brought pt back to hospital. Nurse and tech met father and pt infront ED. Pt IV removed, discharge paperwork and instructions given. Meds delivered to bedside earlier today.md notified

## 2022-11-16 NOTE — PROGRESS NOTES
C3 nurse spoke with Tabby Howard for a TCC post hospital discharge follow up call. The patient does not have a scheduled HOSFU appointment with [facility] within 5-7 days post hospital discharge date 11/15/2022. C3 nurse was unable to schedule HOSFU appointment in [facility].    Message sent to PCP staff requesting they contact patient and schedule follow up appointment.

## 2022-11-16 NOTE — PROGRESS NOTES
"  C3 nurse spoke with Tabby Howard for a TCC post hospital discharge follow up call. The patient does not have a scheduled HOSFU appointment with [facility] within 5-7 days post hospital discharge date 11/15/2022. C3 nurse was unable to schedule HOSFU appointment in [facility].    Message sent to PCP staff requesting they contact patient and schedule follow up appointment.       Mother states, "I am going to call them when we get back home for an appointment." If PCP unable to schedule, C3 RN will offer NP home referral.     Mother unable to confirm medications at this TCC call, states, "we're traveling right now and the list is at home." Patient's mother agrees to CB this evening for medication list review.        "

## 2022-11-17 NOTE — PHYSICIAN QUERY
PT Name: Tabby Howard  MR #: [id number]     DOCUMENTATION CLARIFICATION     CDS/: Linette Torre RN              Contact information: [email or web address]  This form is a permanent document in the medical record.     Query Date: November 17, 2022    By submitting this query, we are merely seeking further clarification of documentation.  Please utilize your independent clinical judgment when addressing the question(s) below.  The Medical Record contains the following   Indicators   Supporting Clinical Findings Location in Medical Record   x Documentation of Respiratory Failure, ARDS Acute hypoxemic respiratory failure  Continuing current management with [facility]  Needs Walk Test    Hypoxemia resp. Failure   11/14 Hosp Med PN      11/14 Renal MD PN     x SOB, HASTINGS, Wheezing, Productive Cough, Use of Accessory Muscles, etc. Positive for shortness of breath.  NO respiratory distress    No tachypnea, accessory muscle usage or respiratory distress    No tachypnea, accessory muscle usage or respiratory distress     11/11 ED MD PN    11/13 Hosp Med PN    11/14 Hosp Med PN   x RR     ABGs     O2 sat O2 sat % on Room air    O2 sat 94%  O2 sat 91- 94% 3L NC  O2 sat % on 3L NC  O2 sat % on 3L NC    Home oxygen evaluation:  Home O2 sat on room air, while at rest:  96%   11/11 FLowsheet    11/12 Flowsheet  11/12 Flowsheet  11/13 FLowsheet  11/14 Flowsheet    11/15 RN PN        Hypoxia/Hypercapnia      BiPAP/Intubation/Mechanical Ventilation     x Supplemental O2 O2 3L NC 11/12-14 Flowsheet      Home O2, Oxygen Dependence     x Respiratory distress  NO respiratory distress 11/11 ED MD PN     x Radiology findings Stable position of large-bore right jugular catheter and enlargement of cardiopericardial silhouette with no active pulmonary findings and no detrimental changes compared to earlier exam 11/11 CXR       x Acute/Chronic Illness Volume overload  Abdominal pain   Epigastric pain  Nausea and  vomiting  Gastroparesis  Anemia of chronic disease  ESRD   11/14 Hosp Med PN   x Treatment Oxygen  Pulse oximetry  Cardiac monitor 11/12 NSG orders        Other         The noted clinical guidelines following a diagnosis are only system guidelines and do not replace the providers clinical judgment.    Due to the conflicting clinical picture, please clinically validate the diagnosis of _Acute Respiratory Failure Hypoxia .    If validated, please provide additional clinical support for the diagnosis.     [    ] ARF hypoxia is ruled out     [    ] ARF hypoxia is  ruled out.  Other diagnosis ruled in (specify):  (   ) Hypoxia Only  (   ) Acute Respiratory Insufficiency Only  (   ) Other: __________   [   X  ] Acute Respiratory Failure with Hypoxia is confirmed.    (ABG pO2 < 60 mmHg or O2 sat of <91% on room air and respiratory symptoms documented)     Specify clinical criteria to support diagnosis of ARF hypoxia (specify): __O2 sat 91- 94% 3L NC_____________________________________________     [    ] Other clarification (please specify): ___________________           Please document in your progress notes daily for the duration of treatment until resolved and include in your discharge summary.     Reference:    AFSHAN Sommer MD. (2020, March 13). Acute respiratory distress syndrome: Clinical features, diagnosis, and complications in adults ([id number] CALLIE Mcfarland MD & [id number] DELIA Shook MD, Eds.). Retrieved November 13, 2020, from [email or web address]=ards&source=search_result&[email or web address]~150&usage_type=default&display_rank=1  Form No. [id number]

## 2022-11-17 NOTE — PHYSICIAN QUERY
PT Name: Tabby Howard  MR #: [id number]     DOCUMENTATION CLARIFICATION      CDS/: Linette Torre RN             Contact information: [email or web address]  This form is a permanent document in the medical record.     Query Date: November 17, 2022    By submitting this query, we are merely seeking further clarification of documentation.  Please utilize your independent clinical judgment when addressing the question(s) below.     The Medical Record contains the following:    Clinical Information Location in Medical Record   emergency department with complaints of generalized abdominal pain with nausea and vomiting, diffuse back pain, and mid-sternal chest pain. Upon review of medical records, patient with multiple admissions for similar symptoms.     Her last dialysis session was on Wednesday, but missed HD on Friday due to symptoms      CT chest abdomen pelvis without contrast --In comparison to prior, notable for worsening pericardial effusion as well as worsening body wall anasarca and fluid in the pelvis concerning for volume overload.  Edema throughout the gastric walls was new since prior exam, likely due to volume overload in setting of presentation    Volume overload  Abdominal pain   Epigastric pain  Nausea and vomiting  Gastroparesis    Multiple admissions for similar symptomology per chart review.     Misses HD sessions.    CT chest abdomen pelvis notable for findings concerning for severe volume overload; likely etiology of her symptoms.   11/12 Renal MD PN          11/14 Hosp Med MD PN     Please document your best medical opinion regarding the etiology of Fluid overload ?       [ x ] Volume overload due to ESRD- Missed Dialysis     [   ] Other etiology (please specify):___________________     [  ] Clinically Undetermined             Please document in your progress notes daily for the duration of treatment, until resolved, and include in your discharge summary.

## 2022-11-19 ENCOUNTER — HOSPITAL ENCOUNTER (INPATIENT)
Facility: HOSPITAL | Age: 32
LOS: 4 days | Discharge: HOME | DRG: 073 | End: 2022-11-23
Attending: EMERGENCY MEDICINE | Admitting: INTERNAL MEDICINE
Payer: MEDICAID

## 2022-11-19 DIAGNOSIS — R11.15 INTRACTABLE CYCLICAL VOMITING SYNDROME: ICD-10-CM

## 2022-11-19 DIAGNOSIS — R10.9 ABDOMINAL PAIN: Primary | ICD-10-CM

## 2022-11-19 DIAGNOSIS — R10.13 EPIGASTRIC PAIN: ICD-10-CM

## 2022-11-19 DIAGNOSIS — K31.84 GASTROPARESIS: ICD-10-CM

## 2022-11-19 DIAGNOSIS — I31.39 PERICARDIAL EFFUSION: ICD-10-CM

## 2022-11-19 LAB
ALBUMIN SERPL BCP-MCNC: 3.3 G/DL (ref 3.5–5.2)
ALP SERPL-CCNC: 288 U/L (ref 55–135)
ALT SERPL W/O P-5'-P-CCNC: 68 U/L (ref 10–44)
AMPHET+METHAMPHET UR QL: NEGATIVE
AMPHET+METHAMPHET UR QL: NEGATIVE
ANION GAP SERPL CALC-SCNC: 11 MMOL/L (ref 8–16)
AST SERPL-CCNC: 66 U/L (ref 10–40)
B-HCG UR QL: NEGATIVE
BACTERIA #/AREA URNS HPF: NEGATIVE /HPF
BARBITURATES UR QL SCN>200 NG/ML: NEGATIVE
BARBITURATES UR QL SCN>200 NG/ML: NEGATIVE
BASOPHILS # BLD AUTO: 0.04 K/UL (ref 0–0.2)
BASOPHILS NFR BLD: 0.6 % (ref 0–1.9)
BENZODIAZ UR QL SCN>200 NG/ML: NEGATIVE
BENZODIAZ UR QL SCN>200 NG/ML: NEGATIVE
BILIRUB SERPL-MCNC: 2 MG/DL (ref 0.1–1)
BILIRUB UR QL STRIP: ABNORMAL
BUN SERPL-MCNC: 40 MG/DL (ref 6–20)
BZE UR QL SCN: NEGATIVE
BZE UR QL SCN: NEGATIVE
CALCIUM SERPL-MCNC: 8.9 MG/DL (ref 8.7–10.5)
CANNABINOIDS UR QL SCN: NEGATIVE
CANNABINOIDS UR QL SCN: NEGATIVE
CHLORIDE SERPL-SCNC: 95 MMOL/L (ref 95–110)
CK SERPL-CCNC: 120 U/L (ref 20–180)
CLARITY UR: CLEAR
CO2 SERPL-SCNC: 26 MMOL/L (ref 23–29)
COLOR UR: YELLOW
CREAT SERPL-MCNC: 4.3 MG/DL (ref 0.5–1.4)
CREAT UR-MCNC: 88 MG/DL (ref 15–325)
CREAT UR-MCNC: 90 MG/DL (ref 15–325)
CTP QC/QA: YES
DIFFERENTIAL METHOD: ABNORMAL
EOSINOPHIL # BLD AUTO: 0.1 K/UL (ref 0–0.5)
EOSINOPHIL NFR BLD: 1.1 % (ref 0–8)
ERYTHROCYTE [DISTWIDTH] IN BLOOD BY AUTOMATED COUNT: 14.5 % (ref 11.5–14.5)
EST. GFR  (NO RACE VARIABLE): 13.3 ML/MIN/1.73 M^2
GLUCOSE SERPL-MCNC: 140 MG/DL (ref 70–110)
GLUCOSE UR QL STRIP: ABNORMAL
GRAN CASTS #/AREA URNS LPF: 1 /LPF
HCT VFR BLD AUTO: 27.2 % (ref 37–48.5)
HGB BLD-MCNC: 9.6 G/DL (ref 12–16)
HGB UR QL STRIP: ABNORMAL
HYALINE CASTS #/AREA URNS LPF: 2 /LPF
IMM GRANULOCYTES # BLD AUTO: 0.04 K/UL (ref 0–0.04)
IMM GRANULOCYTES NFR BLD AUTO: 0.6 % (ref 0–0.5)
INFLUENZA A, MOLECULAR: NEGATIVE
INFLUENZA B, MOLECULAR: NEGATIVE
KETONES UR QL STRIP: NEGATIVE
LEUKOCYTE ESTERASE UR QL STRIP: NEGATIVE
LIPASE SERPL-CCNC: 33 U/L (ref 4–60)
LYMPHOCYTES # BLD AUTO: 0.8 K/UL (ref 1–4.8)
LYMPHOCYTES NFR BLD: 12.5 % (ref 18–48)
MCH RBC QN AUTO: 28.4 PG (ref 27–31)
MCHC RBC AUTO-ENTMCNC: 35.3 G/DL (ref 32–36)
MCV RBC AUTO: 81 FL (ref 82–98)
MICROSCOPIC COMMENT: ABNORMAL
MONOCYTES # BLD AUTO: 0.4 K/UL (ref 0.3–1)
MONOCYTES NFR BLD: 6.3 % (ref 4–15)
NEUTROPHILS # BLD AUTO: 4.9 K/UL (ref 1.8–7.7)
NEUTROPHILS NFR BLD: 78.9 % (ref 38–73)
NITRITE UR QL STRIP: NEGATIVE
NRBC BLD-RTO: 0 /100 WBC
OPIATES UR QL SCN: NEGATIVE
OPIATES UR QL SCN: NEGATIVE
PCP UR QL SCN>25 NG/ML: NEGATIVE
PCP UR QL SCN>25 NG/ML: NEGATIVE
PH UR STRIP: 8 [PH] (ref 5–8)
PLATELET # BLD AUTO: 112 K/UL (ref 150–450)
PMV BLD AUTO: 9.3 FL (ref 9.2–12.9)
POTASSIUM SERPL-SCNC: 3.4 MMOL/L (ref 3.5–5.1)
PROCALCITONIN SERPL IA-MCNC: 0.32 NG/ML (ref 0–0.5)
PROT SERPL-MCNC: 7 G/DL (ref 6–8.4)
PROT UR QL STRIP: ABNORMAL
RBC # BLD AUTO: 3.38 M/UL (ref 4–5.4)
RBC #/AREA URNS HPF: 38 /HPF (ref 0–4)
SARS-COV-2 RDRP RESP QL NAA+PROBE: NEGATIVE
SODIUM SERPL-SCNC: 132 MMOL/L (ref 136–145)
SP GR UR STRIP: 1.02 (ref 1–1.03)
SPECIMEN SOURCE: NORMAL
SQUAMOUS #/AREA URNS HPF: 11 /HPF
TOXICOLOGY INFORMATION: NORMAL
TOXICOLOGY INFORMATION: NORMAL
TROPONIN I SERPL HS-MCNC: 34.7 PG/ML (ref 0–14.9)
TROPONIN I SERPL HS-MCNC: 34.8 PG/ML (ref 0–14.9)
URN SPEC COLLECT METH UR: ABNORMAL
UROBILINOGEN UR STRIP-ACNC: NEGATIVE EU/DL
WBC # BLD AUTO: 6.18 K/UL (ref 3.9–12.7)
WBC #/AREA URNS HPF: 25 /HPF (ref 0–5)
YEAST URNS QL MICRO: ABNORMAL

## 2022-11-19 PROCEDURE — 82550 ASSAY OF CK (CPK): CPT | Performed by: EMERGENCY MEDICINE

## 2022-11-19 PROCEDURE — U0002 COVID-19 LAB TEST NON-CDC: HCPCS | Performed by: EMERGENCY MEDICINE

## 2022-11-19 PROCEDURE — 83690 ASSAY OF LIPASE: CPT | Performed by: EMERGENCY MEDICINE

## 2022-11-19 PROCEDURE — 80053 COMPREHEN METABOLIC PANEL: CPT | Performed by: EMERGENCY MEDICINE

## 2022-11-19 PROCEDURE — 96376 TX/PRO/DX INJ SAME DRUG ADON: CPT

## 2022-11-19 PROCEDURE — 63600175 PHARM REV CODE 636 W HCPCS: Performed by: INTERNAL MEDICINE

## 2022-11-19 PROCEDURE — 93005 ELECTROCARDIOGRAM TRACING: CPT | Performed by: SPECIALIST

## 2022-11-19 PROCEDURE — 12000002 HC ACUTE/MED SURGE SEMI-PRIVATE ROOM

## 2022-11-19 PROCEDURE — 96365 THER/PROPH/DIAG IV INF INIT: CPT

## 2022-11-19 PROCEDURE — 25000003 PHARM REV CODE 250: Performed by: INTERNAL MEDICINE

## 2022-11-19 PROCEDURE — 36415 COLL VENOUS BLD VENIPUNCTURE: CPT | Performed by: EMERGENCY MEDICINE

## 2022-11-19 PROCEDURE — 96375 TX/PRO/DX INJ NEW DRUG ADDON: CPT

## 2022-11-19 PROCEDURE — 51701 INSERT BLADDER CATHETER: CPT

## 2022-11-19 PROCEDURE — 80307 DRUG TEST PRSMV CHEM ANLYZR: CPT | Performed by: EMERGENCY MEDICINE

## 2022-11-19 PROCEDURE — G0378 HOSPITAL OBSERVATION PER HR: HCPCS

## 2022-11-19 PROCEDURE — 87086 URINE CULTURE/COLONY COUNT: CPT | Performed by: EMERGENCY MEDICINE

## 2022-11-19 PROCEDURE — 85025 COMPLETE CBC W/AUTO DIFF WBC: CPT | Performed by: EMERGENCY MEDICINE

## 2022-11-19 PROCEDURE — 93005 ELECTROCARDIOGRAM TRACING: CPT

## 2022-11-19 PROCEDURE — 84145 PROCALCITONIN (PCT): CPT | Performed by: EMERGENCY MEDICINE

## 2022-11-19 PROCEDURE — 63600175 PHARM REV CODE 636 W HCPCS: Performed by: EMERGENCY MEDICINE

## 2022-11-19 PROCEDURE — 87040 BLOOD CULTURE FOR BACTERIA: CPT | Mod: 59 | Performed by: EMERGENCY MEDICINE

## 2022-11-19 PROCEDURE — 81001 URINALYSIS AUTO W/SCOPE: CPT | Performed by: EMERGENCY MEDICINE

## 2022-11-19 PROCEDURE — 81025 URINE PREGNANCY TEST: CPT | Performed by: EMERGENCY MEDICINE

## 2022-11-19 PROCEDURE — 36415 COLL VENOUS BLD VENIPUNCTURE: CPT | Performed by: NURSE PRACTITIONER

## 2022-11-19 PROCEDURE — 93010 EKG 12-LEAD: ICD-10-PCS | Mod: ,,, | Performed by: SPECIALIST

## 2022-11-19 PROCEDURE — 90935 HEMODIALYSIS ONE EVALUATION: CPT

## 2022-11-19 PROCEDURE — 84484 ASSAY OF TROPONIN QUANT: CPT | Performed by: EMERGENCY MEDICINE

## 2022-11-19 PROCEDURE — 96367 TX/PROPH/DG ADDL SEQ IV INF: CPT

## 2022-11-19 PROCEDURE — 25000003 PHARM REV CODE 250: Performed by: NURSE PRACTITIONER

## 2022-11-19 PROCEDURE — 84484 ASSAY OF TROPONIN QUANT: CPT | Mod: 91 | Performed by: NURSE PRACTITIONER

## 2022-11-19 PROCEDURE — 87502 INFLUENZA DNA AMP PROBE: CPT | Performed by: EMERGENCY MEDICINE

## 2022-11-19 PROCEDURE — 99285 EMERGENCY DEPT VISIT HI MDM: CPT | Mod: 25

## 2022-11-19 PROCEDURE — 25000003 PHARM REV CODE 250: Performed by: EMERGENCY MEDICINE

## 2022-11-19 PROCEDURE — 93010 ELECTROCARDIOGRAM REPORT: CPT | Mod: ,,, | Performed by: SPECIALIST

## 2022-11-19 RX ORDER — CARVEDILOL 25 MG/1
25 TABLET ORAL 2 TIMES DAILY
Status: DISCONTINUED | OUTPATIENT
Start: 2022-11-19 | End: 2022-11-23 | Stop reason: HOSPADM

## 2022-11-19 RX ORDER — AMLODIPINE BESYLATE 5 MG/1
10 TABLET ORAL DAILY
Status: DISCONTINUED | OUTPATIENT
Start: 2022-11-20 | End: 2022-11-23 | Stop reason: HOSPADM

## 2022-11-19 RX ORDER — SODIUM CHLORIDE 0.9 % (FLUSH) 0.9 %
10 SYRINGE (ML) INJECTION
Status: DISCONTINUED | OUTPATIENT
Start: 2022-11-19 | End: 2022-11-23 | Stop reason: HOSPADM

## 2022-11-19 RX ORDER — ONDANSETRON 2 MG/ML
4 INJECTION INTRAMUSCULAR; INTRAVENOUS EVERY 8 HOURS PRN
Status: DISCONTINUED | OUTPATIENT
Start: 2022-11-19 | End: 2022-11-23 | Stop reason: HOSPADM

## 2022-11-19 RX ORDER — MORPHINE SULFATE 2 MG/ML
2 INJECTION, SOLUTION INTRAMUSCULAR; INTRAVENOUS
Status: COMPLETED | OUTPATIENT
Start: 2022-11-19 | End: 2022-11-19

## 2022-11-19 RX ORDER — IBUPROFEN 200 MG
16 TABLET ORAL
Status: DISCONTINUED | OUTPATIENT
Start: 2022-11-20 | End: 2022-11-23 | Stop reason: HOSPADM

## 2022-11-19 RX ORDER — CLONIDINE 0.2 MG/24H
1 PATCH, EXTENDED RELEASE TRANSDERMAL
Status: DISCONTINUED | OUTPATIENT
Start: 2022-11-20 | End: 2022-11-23 | Stop reason: HOSPADM

## 2022-11-19 RX ORDER — GABAPENTIN 100 MG/1
100 CAPSULE ORAL 2 TIMES DAILY
Status: DISCONTINUED | OUTPATIENT
Start: 2022-11-19 | End: 2022-11-23 | Stop reason: HOSPADM

## 2022-11-19 RX ORDER — GLUCAGON 1 MG
1 KIT INJECTION
Status: DISCONTINUED | OUTPATIENT
Start: 2022-11-20 | End: 2022-11-23 | Stop reason: HOSPADM

## 2022-11-19 RX ORDER — ISOSORBIDE MONONITRATE 30 MG/1
120 TABLET, EXTENDED RELEASE ORAL DAILY
Status: DISCONTINUED | OUTPATIENT
Start: 2022-11-20 | End: 2022-11-23 | Stop reason: HOSPADM

## 2022-11-19 RX ORDER — ONDANSETRON 2 MG/ML
4 INJECTION INTRAMUSCULAR; INTRAVENOUS
Status: COMPLETED | OUTPATIENT
Start: 2022-11-19 | End: 2022-11-19

## 2022-11-19 RX ORDER — HYDROMORPHONE HYDROCHLORIDE 1 MG/ML
1 INJECTION, SOLUTION INTRAMUSCULAR; INTRAVENOUS; SUBCUTANEOUS ONCE
Status: COMPLETED | OUTPATIENT
Start: 2022-11-19 | End: 2022-11-19

## 2022-11-19 RX ORDER — IBUPROFEN 200 MG
24 TABLET ORAL
Status: DISCONTINUED | OUTPATIENT
Start: 2022-11-20 | End: 2022-11-23 | Stop reason: HOSPADM

## 2022-11-19 RX ORDER — TALC
6 POWDER (GRAM) TOPICAL NIGHTLY PRN
Status: DISCONTINUED | OUTPATIENT
Start: 2022-11-19 | End: 2022-11-23 | Stop reason: HOSPADM

## 2022-11-19 RX ORDER — INSULIN ASPART 100 [IU]/ML
0-5 INJECTION, SOLUTION INTRAVENOUS; SUBCUTANEOUS
Status: DISCONTINUED | OUTPATIENT
Start: 2022-11-20 | End: 2022-11-23 | Stop reason: HOSPADM

## 2022-11-19 RX ORDER — PANTOPRAZOLE SODIUM 40 MG/1
40 TABLET, DELAYED RELEASE ORAL DAILY
Status: ON HOLD | COMMUNITY
Start: 2022-11-08 | End: 2023-03-13 | Stop reason: HOSPADM

## 2022-11-19 RX ORDER — ACETAMINOPHEN 325 MG/1
650 TABLET ORAL EVERY 8 HOURS PRN
Status: DISCONTINUED | OUTPATIENT
Start: 2022-11-19 | End: 2022-11-23 | Stop reason: HOSPADM

## 2022-11-19 RX ORDER — FLUOXETINE HYDROCHLORIDE 20 MG/1
20 CAPSULE ORAL DAILY
Status: DISCONTINUED | OUTPATIENT
Start: 2022-11-20 | End: 2022-11-23 | Stop reason: HOSPADM

## 2022-11-19 RX ORDER — HYDRALAZINE HYDROCHLORIDE 25 MG/1
100 TABLET, FILM COATED ORAL EVERY 8 HOURS
Status: DISCONTINUED | OUTPATIENT
Start: 2022-11-19 | End: 2022-11-23 | Stop reason: HOSPADM

## 2022-11-19 RX ORDER — METOCLOPRAMIDE 5 MG/1
5 TABLET ORAL
Status: DISCONTINUED | OUTPATIENT
Start: 2022-11-19 | End: 2022-11-23 | Stop reason: HOSPADM

## 2022-11-19 RX ORDER — MORPHINE SULFATE 2 MG/ML
1 INJECTION, SOLUTION INTRAMUSCULAR; INTRAVENOUS EVERY 6 HOURS PRN
Status: DISCONTINUED | OUTPATIENT
Start: 2022-11-19 | End: 2022-11-20

## 2022-11-19 RX ORDER — LEVETIRACETAM 500 MG/1
500 TABLET ORAL 2 TIMES DAILY
Status: DISCONTINUED | OUTPATIENT
Start: 2022-11-19 | End: 2022-11-23 | Stop reason: HOSPADM

## 2022-11-19 RX ORDER — METOCLOPRAMIDE 10 MG/1
10 TABLET ORAL
Status: DISCONTINUED | OUTPATIENT
Start: 2022-11-19 | End: 2022-11-19

## 2022-11-19 RX ADMIN — METOCLOPRAMIDE HYDROCHLORIDE 5 MG: 5 TABLET ORAL at 09:11

## 2022-11-19 RX ADMIN — ONDANSETRON 4 MG: 2 INJECTION INTRAMUSCULAR; INTRAVENOUS at 01:11

## 2022-11-19 RX ADMIN — HYDROMORPHONE HYDROCHLORIDE 1 MG: 1 INJECTION, SOLUTION INTRAMUSCULAR; INTRAVENOUS; SUBCUTANEOUS at 09:11

## 2022-11-19 RX ADMIN — MORPHINE SULFATE 2 MG: 2 INJECTION, SOLUTION INTRAMUSCULAR; INTRAVENOUS at 01:11

## 2022-11-19 RX ADMIN — APIXABAN 5 MG: 5 TABLET, FILM COATED ORAL at 09:11

## 2022-11-19 RX ADMIN — CARVEDILOL 25 MG: 25 TABLET, FILM COATED ORAL at 09:11

## 2022-11-19 RX ADMIN — MORPHINE SULFATE 2 MG: 2 INJECTION, SOLUTION INTRAMUSCULAR; INTRAVENOUS at 03:11

## 2022-11-19 RX ADMIN — LEVETIRACETAM 500 MG: 500 TABLET, FILM COATED ORAL at 09:11

## 2022-11-19 RX ADMIN — CEFTRIAXONE SODIUM 1 G: 1 INJECTION, POWDER, FOR SOLUTION INTRAMUSCULAR; INTRAVENOUS at 09:11

## 2022-11-19 RX ADMIN — HYDRALAZINE HYDROCHLORIDE 100 MG: 25 TABLET ORAL at 09:11

## 2022-11-19 RX ADMIN — PROMETHAZINE HYDROCHLORIDE 6.25 MG: 25 INJECTION INTRAMUSCULAR; INTRAVENOUS at 09:11

## 2022-11-19 RX ADMIN — ONDANSETRON 4 MG: 2 INJECTION INTRAMUSCULAR; INTRAVENOUS at 04:11

## 2022-11-19 RX ADMIN — GABAPENTIN 100 MG: 100 CAPSULE ORAL at 09:11

## 2022-11-19 NOTE — H&P
[facility] Medicine History & Physical Examination   Patient Name: Tabby Howard  MRN: [id number]  Patient Class: Emergency   Admission Date: 11/19/2022 11:22 AM  Length of Stay: 0  Attending Physician:   Primary Care Provider: [facility]  Face-to-Face encounter date: 11/19/2022  Code Status: Full Code  MPOA:  Chief Complaint: Abdominal Pain, Chest Pain, and Back Pain (Since 3 am, Missed HD on Thursday and today)        Patient information was obtained from patient, past medical records and ER records.   HISTORY OF PRESENT ILLNESS:   Tabby Howard is a 32 y.o. old  female who  has a past medical history of CKD (chronic kidney disease), stage IV (4/12/2022), Diabetes mellitus due to underlying condition with unspecified complications (4/12/2022), Gastroparesis (4/12/2022), Heart failure with preserved ejection fraction (4/12/2022), History of gastroesophageal reflux (GERD), History of supraventricular tachycardia, Hyperkalemia (7/7/2022), Hypertensive emergency (7/8/2022), Sickle cell trait (4/12/2022), and Type 2 diabetes mellitus.. The patient presented to [facility] on 11/19/2022 with a primary complaint of Abdominal Pain, Chest Pain, and Back Pain (Since 3 am, Missed HD on Thursday and today)  .   32 year old  female presents to the emergency room with back pain abdominal pain intractable nausea with vomiting and atypical chest pain.  The patient normally dialyzed on Tuesday Thursdays and Saturdays.  She missed her hemodialysis session Thursday secondary to abdominal pain.  The patient states that she has been having nausea and had episode of vomiting today.  It she denies fever chills hematemesis hemoptysis lightheadedness dizziness or syncope she describes her symptoms as moderate to severe severity with no alleviating or exacerbating factors.      Past medical history significant for gastroparesis, type 2 diabetes, deep  vein thrombosis to lower extremity, homelessness, end-stage renal disease with hemodialysis and chronic pericardial effusion.    In the emergency room she was seen by the nephrologist and was taken to hemodialysis shortly thereafter    REVIEW OF SYSTEMS:   10 Point Review of System was performed and was found to be negative except for that mentioned already in the HPI and   Review of Systems (Negative unless checked off)  Review of Systems   Constitutional:  Positive for malaise/fatigue.   HENT: Negative.     Eyes: Negative.    Respiratory: Negative.     Cardiovascular: Negative.    Gastrointestinal:  Positive for abdominal pain, nausea and vomiting.   Genitourinary: Negative.    Musculoskeletal: Negative.    Skin: Negative.    Neurological:  Positive for weakness.   Endo/Heme/Allergies: Negative.    Psychiatric/Behavioral: Negative.           PAST MEDICAL HISTORY:     Past Medical History:   Diagnosis Date    CKD (chronic kidney disease), stage IV 2022    Diabetes mellitus due to underlying condition with unspecified complications 2022    Gastroparesis 2022    Heart failure with preserved ejection fraction 2022    EF 55% on 3/22    History of gastroesophageal reflux (GERD)     History of supraventricular tachycardia     Hyperkalemia 2022    Hypertensive emergency 2022    Sickle cell trait 2022    Type 2 diabetes mellitus        PAST SURGICAL HISTORY:     Past Surgical History:   Procedure Laterality Date     SECTION      x 3    COLONOSCOPY      COLONOSCOPY N/A 2022    Procedure: COLONOSCOPY;  Surgeon: Jagdeep Cedeno MD;  Location: [facility];  Service: Endoscopy;  Laterality: N/A;    ESOPHAGOGASTRODUODENOSCOPY N/A 10/18/2019    Procedure: ESOPHAGOGASTRODUODENOSCOPY (EGD);  Surgeon: Gianluca Mendez MD;  Location: [facility];  Service: Endoscopy;  Laterality: N/A;    ESOPHAGOGASTRODUODENOSCOPY N/A 2022    Procedure: EGD (ESOPHAGOGASTRODUODENOSCOPY);  Surgeon: Micky  TETE Paredes III, MD;  Location: [facility] ENDO;  Service: Endoscopy;  Laterality: N/A;    LAPAROSCOPIC CHOLECYSTECTOMY N/A 07/30/2022    Procedure: CHOLECYSTECTOMY, LAPAROSCOPIC;  Surgeon: Grey Perez MD;  Location: [facility] OR;  Service: General;  Laterality: N/A;    PLACEMENT OF DUAL-LUMEN VASCULAR CATHETER Left 07/12/2022    Procedure: INSERTION-CATHETER-JOSEPH;  Surgeon: Dionte Gan MD;  Location: [facility] OR;  Service: General;  Laterality: Left;    PLACEMENT OF DUAL-LUMEN VASCULAR CATHETER Right 07/26/2022    Procedure: INSERTION-CATHETER-Hemosplit;  Surgeon: Dionte Gan MD;  Location: [facility] OR;  Service: General;  Laterality: Right;       ALLERGIES:   Penicillins    FAMILY HISTORY:     Family History   Problem Relation Age of Onset    Diabetes Mother     Diabetes Father        SOCIAL HISTORY:     Social History     Tobacco Use    Smoking status: Never    Smokeless tobacco: Never   Substance Use Topics    Alcohol use: Not Currently        Social History     Substance and Sexual Activity   Sexual Activity Not Currently    Partners: Male    Birth control/protection: I.U.D.        HOME MEDICATIONS:     Prior to Admission medications    Medication Sig Start Date End Date Taking? Authorizing Provider   albuterol (PROVENTIL/VENTOLIN HFA) 90 mcg/actuation inhaler Inhale 2 puffs into the lungs every 6 (six) hours as needed for Wheezing. Rescue 11/15/22   Светлана Fiore MD   amLODIPine (NORVASC) 10 MG tablet Take 1 tablet (10 mg total) by mouth once daily. 11/15/22 11/15/23  Светлана Fiore MD   apixaban (ELIQUIS) 5 mg Tab Take 1 tablet (5 mg total) by mouth 2 (two) times daily. 11/15/22 2/13/23  Светлана Fiore MD   carvediloL (COREG) 25 MG tablet Take 1 tablet (25 mg total) by mouth 2 (two) times daily. 11/15/22 2/13/23  Светлана Fiore MD   cloNIDine 0.2 mg/24 hr td ptwk (CATAPRES) 0.2 mg/24 hr Place 1 patch onto the skin every 7 days. 11/15/22 11/15/23  Светлана Fiore MD   FLUoxetine 20 MG capsule Take  "1 capsule (20 mg total) by mouth once daily. 11/15/22 11/15/23  Светлана Fiore MD   gabapentin (NEURONTIN) 100 MG capsule Take 1 capsule (100 mg total) by mouth 2 (two) times daily. 11/15/22 11/15/23  Светлана Fiore MD   hydrALAZINE (APRESOLINE) 100 MG tablet Take 1 tablet (100 mg total) by mouth every 8 (eight) hours. 11/15/22 2/13/23  Светлана Fiore MD   insulin aspart U-100 (NOVOLOG) 100 unit/mL (3 mL) InPn pen Inject 1-10 Units into the skin before meals and at bedtime as needed (Hyperglycemia). Max daily dose 40 units. 11/15/22   Светлана Fiore MD   insulin detemir U-100 (LEVEMIR FLEXTOUCH) 100 unit/mL (3 mL) SubQ InPn pen Inject 5 Units into the skin once daily. Discard pen after 42 days. 11/15/22 11/15/23  Светлана Fiore MD   isosorbide mononitrate (IMDUR) 60 MG 24 hr tablet Take 2 tablets (120 mg total) by mouth once daily. 11/15/22 11/15/23  Светлана Fiore MD   levETIRAcetam (KEPPRA) 500 MG Tab Take 1 tablet (500 mg total) by mouth 2 (two) times daily. 11/15/22 11/15/23  Светлана Fiore MD   methocarbamoL (ROBAXIN) 500 MG Tab Take 1 tablet (500 mg total) by mouth 3 (three) times daily. 11/15/22   Светлана Fiore MD   ondansetron (ZOFRAN) 4 MG tablet Take 1 tablet (4 mg total) by mouth every 8 (eight) hours as needed for Nausea. 11/15/22   Светлана Fiore MD   pantoprazole (PROTONIX) 40 MG tablet Take 40 mg by mouth once daily. 11/8/22   Historical Provider   pen needle, diabetic (BD ULTRA-FINE MAGALIE PEN NEEDLE) 32 gauge x 5/32" Ndle Inject into the skin 5 times per day with insulin 11/15/22   Светлана Fiore MD   atenoloL (TENORMIN) 50 MG tablet Take 1 tablet (50 mg total) by mouth every other day. 7/17/22 7/27/22  Keegan Cody MD   omeprazole (PRILOSEC) 20 MG capsule Take 2 capsules (40 mg total) by mouth once daily. for 10 days 8/9/22 8/26/22  Kaushik Patton MD   torsemide (DEMADEX) 20 MG Tab Take 1 tablet (20 mg total) by mouth 2 (two) times a day.  Patient taking " "differently: Take 20 mg by mouth once daily. 6/11/22 7/15/22  Gurmeet Barrera MD         PHYSICAL EXAM:   BP (!) 181/113   Pulse 90   Temp 97.8 °F (36.6 °C) (Oral)   Resp 20   Ht 5' 2" (1.575 m)   Wt 59 kg (130 lb)   SpO2 98%   BMI 23.78 kg/m²   Vitals Reviewed  General appearance: chronically ill appearing female in no apparent distress.  Skin: No Rash.   Neuro: Motor and sensory exams grossly intact. Good tone. Power in all 4 extremities 5/5.   HENT: Atraumatic head. Moist mucous membranes of oral cavity.  Eyes: Normal extraocular movements.   Neck: Supple. No evidence of lymphadenopathy. No thyroidomegaly.  Lungs: Clear to auscultation bilaterally. No wheezing present.   Heart: Regular rate and rhythm. S1 and S2 present with no murmurs/gallop/rub. No pedal edema. No JVD present.   Abdomen: Soft, non-distended,generalized abdomen tenderness  No rebound tenderness/guarding. No masses or organomegaly. Bowel sounds are normal. Bladder is not palpable.   Extremities: No cyanosis, clubbing, or edema.  Psych/mental status: Alert and oriented. Cooperative. Responds appropriately to questions.   EMERGENCY DEPARTMENT LABS AND IMAGING:   Following labs were Reviewed   Recent Labs   Lab 11/19/22  1154   WBC 6.18   HGB 9.6*   HCT 27.2*   *   CALCIUM 8.9   ALBUMIN 3.3*   PROT 7.0   *   K 3.4*   CO2 26   CL 95   BUN 40*   CREATININE 4.3*   ALKPHOS 288*   ALT 68*   AST 66*   BILITOT 2.0*         BMP:   Recent Labs   Lab 11/19/22  1154   *   *   K 3.4*   CL 95   CO2 26   BUN 40*   CREATININE 4.3*   CALCIUM 8.9   , CMP   Recent Labs   Lab 11/19/22  1154   *   K 3.4*   CL 95   CO2 26   *   BUN 40*   CREATININE 4.3*   CALCIUM 8.9   PROT 7.0   ALBUMIN 3.3*   BILITOT 2.0*   ALKPHOS 288*   AST 66*   ALT 68*   ANIONGAP 11   , CBC   Recent Labs   Lab 11/19/22  1154   WBC 6.18   HGB 9.6*   HCT 27.2*   *   , INR   Lab Results   Component Value Date    INR 1.0 10/26/2022    INR 1.1 " 08/23/2022    INR 1.0 07/28/2022   , Lipid Panel No results found for: CHOL, HDL, LDLCALC, TRIG, CHOLHDL, Troponin No results for input(s): TROPONINI in the last 168 hours., A1C:   Recent Labs   Lab 07/22/22  1653 08/24/22  0218   HGBA1C 6.0* 6.2   , and All labs within the past 24 hours have been reviewed  Microbiology Results (last 7 days)       Procedure Component Value Units Date/Time    Urine culture [[id number]] Collected: 11/19/22 1210    Order Status: No result Specimen: Urine Updated: 11/19/22 1354          US Abdomen Limited (Gallbladder)   Final Result      CT Abdomen Pelvis  Without Contrast   Final Result        CT Head Without Contrast    Result Date: 11/7/2022  CMS MANDATED QUALITY DATA - CT RADIATION  436 All CT scans at this facility utilize dose modulation, iterative reconstruction, and/or weight based dosing when appropriate to reduce radiation dose to as low as reasonably achievable. CT HEAD WITHOUT IV CONTRAST CLINICAL HISTORY: 32 years Female new onset visual complaints COMPARISON: CT head October 29, 2022 FINDINGS: Negative for acute intracranial hemorrhage, midline shift, or mass effect. Ventricles and sulci are normal in size. Gray-white differentiation is maintained. Cerebellar hemispheres and brainstem are unremarkable. Atherosclerotic calcification of the intracranial carotid arteries. No calvarial lesion or fracture. Mastoid air cells are clear. Bilateral maxillary sinus mucosal thickening, greater on the left. IMPRESSION: No CT evidence of acute intracranial pathology. Electronically signed by:  Von Bland MD  11/7/2022 4:30 PM CST Workstation: [id number]        Echo Saline Bubble? No    Result Date: 10/28/2022  · The left ventricular global longitudinal strain is --11.45%%. · Moderate concentric hypertrophy and moderately decreased systolic function. · The estimated ejection fraction is 34%. · Left ventricular diastolic dysfunction. · Strain study demonstrates apical sparing with  normal G LS at the apex a -20 and then in the midportion bases -10- 7 · Strain study is compatible with amyloid but not diagnostic of amyloid      Echo    Result Date: 10/27/2022  · The left ventricle is normal in size with moderate moderate asymmetric hypertrophy eccentric hypertrophy and normal systolic function. · The estimated ejection fraction is 55%. · Indeterminate left ventricular diastolic function with normal left atrial pressure. · There are segmental left ventricular wall motion abnormalities. · Atrial fibrillation not observed. · Normal right ventricular size with normal right ventricular systolic function. · There is right ventricular hypertrophy. · There is abnormal septal wall motion. There is systolic flattening of the interventricular septum consistent with right ventricle pressure overload. · Mild to moderate tricuspid regurgitation. · Normal central venous pressure (3 mmHg). · The estimated PA systolic pressure is 35 mmHg. · Moderate circumferential pericardial effusion. Effusion is fluid.  1. Small to moderate pericardial effusion without any evidence of tamponade No ventricular interdependence or right atrial right ventricle collapse 2. Left ventricle hypertrophy with posterior wall thicker than septum Septal hypokinesis noted 3. Pulmonary hypertension mild 4. Prominent eustachian ridge in the right atrium     CT Chest Abdomen Pelvis Without Contrast (XPD)    Result Date: 11/11/2022  EXAMINATION: CT CHEST ABDOMEN PELVIS WITHOUT CONTRAST(XPD) CLINICAL HISTORY: n/v, abdo pain, cp; TECHNIQUE: Low dose axial images, sagittal and coronal reformations were obtained from the thoracic inlet to the pubic symphysis .  Oral contrast was not administered. COMPARISON: CTA chest 10/29/2022, CT abdomen and pelvis 10/26/2022 FINDINGS: The structures at the base of the neck are remarkable for bilateral low attenuating thyroid nodules measuring up to 1.6 cm within the inferior aspects of the thyroid.   Nonemergent ultrasound could be performed for further evaluation as warranted.  There is a moderate pericardial effusion worsened since the previous examination.  Right central venous catheter tip terminates at the level of the distal SVC/right atrial junction.  The heart is not enlarged.  The thoracic aorta tapers normally noting atherosclerotic calcification along its course. The airways are patent.  There is patchy ground-glass attenuation throughout the pulmonary parenchyma bilaterally noting scattered regions of patchy consolidation within the bilateral lower lobes, left greater than right, also along the fissure on the left.  No pneumothorax.  No pleural effusion.  There is bilateral basilar dependent atelectasis. Allowing for motion artifact, the liver is somewhat high attenuating, correlation with any history of iron overload or amiodarone use.  The spleen is enlarged.  The adrenal glands and pancreas are grossly unremarkable.  The stomach is decompressed noting there is edema throughout the gastric walls, correlation with any history of gastritis.  The gallbladder is surgically absent.  No significant biliary dilation.  No significant abdominal lymphadenopathy. There is no right hydronephrosis or right nephrolithiasis noting right renal vascular calcification.  The right ureter is unable to be followed in its entirety to the urinary bladder, no definite calculi seen.  There is left perinephric fat stranding.  There is mild-to-moderate left hydronephrosis.  The left ureter is prominent.  No definite calculi seen along the course of the left ureter.  The urinary bladder is distended noting wall thickening.  The uterus and adnexa are grossly unremarkable.  There is fluid in the pelvis. There are a few scattered colonic diverticula without inflammation.  There is moderate stool in the colon.  The terminal ileum is unremarkable.  The appendix is unremarkable.  The small bowel is grossly unremarkable.  There is  strand-like fluid in the abdomen and pelvis.  No focal organized pelvic fluid collection. No acute osseous abnormality.  No significant inguinal lymphadenopathy.  No significant axillary lymphadenopathy.  There is diffuse body wall anasarca.     This report was flagged in Epic as abnormal. 1. In comparison to examination 10/29/2022, pericardial effusion has worsened as well as worsening body wall anasarca and fluid in the pelvis, findings are concerning for volume overload, correlation is advised. 2. Patchy ground-glass and consolidation throughout the pulmonary parenchyma particularly involving the bilateral lower lobes findings are concerning for edema and superimposed developing infectious process.  Correlation is advised. 3. There is edema throughout the gastric walls, new since the previous exam.  This may be on the basis of volume overload or gastritis.  Correlation recommended. 4. Urinary bladder is mildly distended noting diffuse wall thickening, correlation with urinalysis advised to exclude cystitis.  The differential would potentially include sequela of recently passed calculus however infection is of concern.  Correlation advised. 5. Please see above for several additional findings. Electronically signed by: Max Mancia MD Date:    11/11/2022 Time:    18:38    US Abdomen Limited (Gallbladder)    Result Date: 11/19/2022  Examination: Limited abdominal sonogram. CLINICAL HISTORY: Abdominal pain. COMPARISON: None. TECHNIQUE: Real-time sonographic evaluation of the patient's right upper quadrant was employed utilizing both gray scale and color flow imaging. FINDINGS: The gallbladder has been surgically resected. Liver is prominent size 17.5 cm maintaining uniform echogenicity pattern without definable mass or cyst. Normal amount of fluid is identified adjacent to the inferior edge of the liver capsule. No sonographic Perez's sign was reported. The biliary tracts are normal. Hepatic veins empty normally  into the inferior vena cava. The common duct is normal in caliber 0.5 cm. The right kidney measures 11.3 cm in the with uniform echotexture. No evidence of mass or cyst. The perinephric spaces are well-maintained. IMPRESSION: 1. Status post cholecystectomy. 2. No acute intra-abdominal abdominal findings. 3. Mild hepatomegaly. Electronically signed by:  Deandre Vitale MD  11/19/2022 3:26 PM CST Workstation: [id number]    US Abdomen Limited    Result Date: 11/6/2022  HISTORY: Elevated serum LFTs, bilirubin. FINDINGS: Comparison to the CT of 10/29/2022. The visualized pancreas has normal echotexture with no peripancreatic fluid collections. The liver is normal in size and echotexture, 18 cm in length, without focal lesions or intrahepatic biliary ductal dilatation. The gallbladder is surgically absent. The common duct measures 5 mm in diameter. The right kidney measures 11.4 cm in length, with normal parenchymal echotexture, and no echogenic calculi or hydronephrosis. The visualized abdominal aorta, IVC and main portal vein are normal. There is small volume of anechoic perihepatic ascites. IMPRESSION: 1. Normal sonographic appearance of the liver. 2. Prior cholecystectomy. 3. Small volume of simple appearing ascites. Electronically signed by:  Daniel Edmondson MD  11/6/2022 2:01 PM CST Workstation: [id number]    CT Abdomen Pelvis  Without Contrast    Result Date: 11/19/2022  EXAMINATION: CT ABDOMEN PELVIS WITHOUT CONTRAST CLINICAL INDICATION: Female, 32 years old. Abdominal pain, acute, nonlocalized TECHNIQUE:  CT scan examination of the abdomen and pelvis is performed from the domes of the diaphragm to the pubic symphysis with and without administration of intravenous contrast material. CONTRAST: 100 cc of Omnipaque 350 COMPARISON: 10/26/2022 FINDINGS: Lower Chest: Stable large pericardial effusion encircles the pericardial sac which showed evidence of maximum thickness on the right posterior lateral edge measuring  upwards of 4.1 cm in size. Regional areas of groundglass opacity changes and reticular opacities within the lower lobes have diminished substantially with minimal scarring in the left lower lobe projecting over the diaphragmatic surface. Liver: Liver maintains normal size and uniform outline. No evidence of intrahepatic mass. Bile Ducts: Normal caliber. Gallbladder: Status post cholecystectomy. Pancreas: Pancreas maintains normal size and overall outline. No appreciable peripancreatic inflammatory stranding. Spleen: Normal in size and contour. Adrenals: Normal configuration. Kidneys and Ureters: [Advanced normal intensity evidence of prominent perinephric stranding and inflammatory response changes likely due to ongoing pyelonephritis. Bladder: Urinary bladder is filled to capacity. There is no appreciable wall thickening. Stomach: Distended with admixing of ingested food content. Small Intestine: No evidence of intestinal obstruction. There is no evidence of pneumoperitoneum. There is a moderate amount of free fluid projecting below the inferior margin liver as well as in the deep pelvic cavity. Appendix: No inflammatory changes. Colon: Old and is normal in size and overall caliber with moderate thickening of the ascending 7 secondary to colitis being suspected. Vessels: Abdominal aorta and inferior vena cava are normal in course and caliber. Reproductive Organs: Uterus appears present without evidence of pathologic findings. Lymph Nodes: No pathologic mesenteric or retroperitoneal lymph nodes. Peritoneum: No free air, free fluid, or fluid collection. Abdominal Wall: This evidence of diffuse body wall anasarca the maintains equal parameters as compared to previous. Musculoskeletal: No acute abnormality or suspicious bony lesion. IMPRESSION: 1. Abnormal left kidney with evidence of fairly prominent perinephric stranding inflammatory response change attributed towards ongoing pyelonephritis. 2. Persistent bladder  distention with evidence of prominent circumferential wall thickening attributed towards underlying cystitis.. 3.. Diffuse body wall anasarca. 4. Regional areas of inflammatory stranding and consolidation basilar segments have resolved. 5. Large pericardial effusion stable. This exam was performed according to our departmental dose-optimization program which includes automated exposure control, adjustment of the mA and/or kV according to patient size and/or use of iterative reconstruction technique. Electronically signed by:  Deandre Vitale MD  11/19/2022 2:03 PM [facility] Workstation: [id number]      ASSESSMENT & PLAN:   Tabby Howard is a 32 y.o. female admitted for     Intractable Nausea with vomiting Generalized abdominal pain  Unspecified.  CT abdomen and pelvis without contrast results reviewed, no acute intra-abdominal process noted. Diffused edema s/t volume overload  Symptoms are likely related to underlying gastroparesis   Continue supportive care   with intravenous narcotics-sparing use.    Use antiemetics as needed.        2. ESRD (end stage renal disease)  hemodialysis is being arranged by Dr. Clark  Currently in HD   Follow Nephrology recommendation     3. Gastroparesis  Reglan  Continue supportive care with sparing use of narcotics.    Continue use of antiemetics and proton pump inhibitor.      4. H/O Deep vein thrombosis (DVT) of non-extremity vein  Continue Eliquis as before.        5. Seizure  Continue Keppra   seizure precautions.        6. Diabetes mellitus due to underlying condition with unspecified                 complications  Low dose NovoLog insulin sliding scale  Hypoglycemic protocol  Diabetic cardiac renal diet once eating     7. Chronic Moderate Pericardial  Effusion              8. HTN (hypertension)  Continue use of home medications to manage  Use intravenous hydralazine 10 mg IV q.6 hours p.r.n. for systolic blood pressure above 160 mmHg.       DVT Prophylaxis: will be placed on Eliquis  for DVT prophylaxis and will be advised to be as mobile as possible and sit in a chair as tolerated.   ________________________________________________________________  Face-to-Face encounter date: 11/19/2022  Encounter included review of the medical records, interviewing and examining the patient face-to-face, discussion with family and other health care providers including emergency medicine physician, admission orders, interpreting lab/test results and formulating a plan of care.   Medical Decision Making during this encounter was  [_] Low Complexity  [_] Moderate Complexity  [x] High Complexity  _________________________________________________________________________________    INPATIENT LIST OF MEDICATIONS     Current Facility-Administered Medications:     cefTRIAXone (ROCEPHIN) 1 g/50 mL D5W IVPB, 1 g, Intravenous, ED 1 Time, Jazmin Moore MD    Current Outpatient Medications:     albuterol (PROVENTIL/VENTOLIN HFA) 90 mcg/actuation inhaler, Inhale 2 puffs into the lungs every 6 (six) hours as needed for Wheezing. Rescue, Disp: 18 g, Rfl: 3    amLODIPine (NORVASC) 10 MG tablet, Take 1 tablet (10 mg total) by mouth once daily., Disp: 30 tablet, Rfl: 11    apixaban (ELIQUIS) 5 mg Tab, Take 1 tablet (5 mg total) by mouth 2 (two) times daily., Disp: 60 tablet, Rfl: 2    carvediloL (COREG) 25 MG tablet, Take 1 tablet (25 mg total) by mouth 2 (two) times daily., Disp: 60 tablet, Rfl: 2    cloNIDine 0.2 mg/24 hr td ptwk (CATAPRES) 0.2 mg/24 hr, Place 1 patch onto the skin every 7 days., Disp: 4 patch, Rfl: 2    FLUoxetine 20 MG capsule, Take 1 capsule (20 mg total) by mouth once daily., Disp: 30 capsule, Rfl: 11    gabapentin (NEURONTIN) 100 MG capsule, Take 1 capsule (100 mg total) by mouth 2 (two) times daily., Disp: 90 capsule, Rfl: 2    hydrALAZINE (APRESOLINE) 100 MG tablet, Take 1 tablet (100 mg total) by mouth every 8 (eight) hours., Disp: 90 tablet, Rfl: 2    insulin aspart U-100 (NOVOLOG) 100 unit/mL (3  "mL) InPn pen, Inject 1-10 Units into the skin before meals and at bedtime as needed (Hyperglycemia). Max daily dose 40 units., Disp: 3 mL, Rfl: 6    insulin detemir U-100 (LEVEMIR FLEXTOUCH) 100 unit/mL (3 mL) SubQ InPn pen, Inject 5 Units into the skin once daily. Discard pen after 42 days., Disp: 6 mL, Rfl: 2    isosorbide mononitrate (IMDUR) 60 MG 24 hr tablet, Take 2 tablets (120 mg total) by mouth once daily., Disp: 30 tablet, Rfl: 11    levETIRAcetam (KEPPRA) 500 MG Tab, Take 1 tablet (500 mg total) by mouth 2 (two) times daily., Disp: 60 tablet, Rfl: 11    methocarbamoL (ROBAXIN) 500 MG Tab, Take 1 tablet (500 mg total) by mouth 3 (three) times daily., Disp: 90 tablet, Rfl: 1    ondansetron (ZOFRAN) 4 MG tablet, Take 1 tablet (4 mg total) by mouth every 8 (eight) hours as needed for Nausea., Disp: 60 tablet, Rfl: 2    pantoprazole (PROTONIX) 40 MG tablet, Take 40 mg by mouth once daily., Disp: , Rfl:     pen needle, diabetic (BD ULTRA-FINE MAGALIE PEN NEEDLE) 32 gauge x 5/32" Ndle, Inject into the skin 5 times per day with insulin, Disp: 100 each, Rfl: 12      Scheduled Meds:   cefTRIAXone (ROCEPHIN) IVPB  1 g Intravenous ED 1 Time     Continuous Infusions:  PRN Meds:.      Dilia Maldonado  [facility] Hospitalist NP  11/19/2022   "

## 2022-11-19 NOTE — ED NOTES
Dr Moore notified elevated troponin. No further orders. Pt signgned dialysis consent and report handoff to Enrique MELGOZA.

## 2022-11-19 NOTE — ED PROVIDER NOTES
Encounter Date: 11/19/2022       History     Chief Complaint   Patient presents with    Abdominal Pain    Chest Pain    Back Pain     Since 3 am, Missed HD on Thursday and today     32-year-old female on dialysis Tuesdays, Thursdays and Saturdays, missed recent dialysis on Thursday presents secondary to abdominal pain.  Past medical hx includes:  Type 2 diabetes mellitus with chronic kidney disease on chronic dialysis, with long-term current use of insulin  Gastroparesis - suspected, unconfirmed  Sickle cell trait  Deep vein thrombosis (DVT) of non-extremity vein  Thrombocytopenia  SVT (supraventricular tachycardia)  Gastritis  Gallstones  Chronic kidney disease-mineral and bone disorder  Homelessness  Diabetes mellitus due to underlying condition with unspecified complications  Seizure  Hydroureter on left  Nephrotic range proteinuria  Pericardial effusion  Acute hypoxemic respiratory failure  ESRD (end stage renal disease) on dialysis  Pulmonary edema  Hypertension  Acute cholecystitis  Volume overload  Malnutrition of moderate degree  Clostridium difficile infection  Chest pain  Hyponatremia  Anemia, chronic disease  Abdominal pain  Hyperglycemia  Vision loss, right eye  Anemia of chronic kidney failure, stage 5  Stable proliferative diabetic retinopathy of both eyes associated with type 2 diabetes mellitus  Vitreous hemorrhage, both eyes  Cortical cataract of both eyes  Nausea and vomiting  Epigastric pain  Jagdeep as Reviewed     The patient states she awoke at 4:00 a.m. with nausea and diffuse abdominal pain.  The pain is severe in nature.  Pain has been constant since onset.  She reports vomiting without diarrhea.  She denies fever or chills.  She denies chest pain or shortness of breath.  She only makes very little urine.  She denies flank pain.  The pain has radiated to her back.  She denies syncope or near-syncope.  She denies any headache or visual changes.  She denies any lower extremity edema.  She denies  any other problems or complaints.  Patient is currently a poor historian as she appears uncomfortable.  Chart review demonstrates multiple admissions with similar symptoms.  There is a reported history of gastroparesis and cannabinoid use.      Review of patient's allergies indicates:   Allergen Reactions    Penicillins Hives     Past Medical History:   Diagnosis Date    CKD (chronic kidney disease), stage IV 2022    Diabetes mellitus due to underlying condition with unspecified complications 2022    Gastroparesis 2022    Heart failure with preserved ejection fraction 2022    EF 55% on 3/22    History of gastroesophageal reflux (GERD)     History of supraventricular tachycardia     Hyperkalemia 2022    Hypertensive emergency 2022    Sickle cell trait 2022    Type 2 diabetes mellitus      Past Surgical History:   Procedure Laterality Date     SECTION      x 3    COLONOSCOPY      COLONOSCOPY N/A 2022    Procedure: COLONOSCOPY;  Surgeon: Jagdeep Cedeno MD;  Location: [facility];  Service: Endoscopy;  Laterality: N/A;    ESOPHAGOGASTRODUODENOSCOPY N/A 10/18/2019    Procedure: ESOPHAGOGASTRODUODENOSCOPY (EGD);  Surgeon: Gianluca Mendez MD;  Location: [facility];  Service: Endoscopy;  Laterality: N/A;    ESOPHAGOGASTRODUODENOSCOPY N/A 2022    Procedure: EGD (ESOPHAGOGASTRODUODENOSCOPY);  Surgeon: Micky Paredes III, MD;  Location: [facility];  Service: Endoscopy;  Laterality: N/A;    LAPAROSCOPIC CHOLECYSTECTOMY N/A 2022    Procedure: CHOLECYSTECTOMY, LAPAROSCOPIC;  Surgeon: Grey Perez MD;  Location: [facility] OR;  Service: General;  Laterality: N/A;    PLACEMENT OF DUAL-LUMEN VASCULAR CATHETER Left 2022    Procedure: INSERTION-CATHETER-JOSEPH;  Surgeon: Dionte Gan MD;  Location: [facility] OR;  Service: General;  Laterality: Left;    PLACEMENT OF DUAL-LUMEN VASCULAR CATHETER Right 2022    Procedure: INSERTION-CATHETER-Hemosplit;  Surgeon: Dionte KATZ  MD Elvia;  Location: [facility];  Service: General;  Laterality: Right;     Family History   Problem Relation Age of Onset    Diabetes Mother     Diabetes Father      Social History     Tobacco Use    Smoking status: Never    Smokeless tobacco: Never   Substance Use Topics    Alcohol use: Not Currently    Drug use: No     Review of Systems   Constitutional: Negative.  Negative for activity change, appetite change, chills, fatigue and fever.   HENT: Negative.  Negative for congestion, dental problem, ear pain, rhinorrhea, sinus pressure, sinus pain, sore throat and trouble swallowing.    Eyes: Negative.  Negative for photophobia, pain, redness and visual disturbance.   Respiratory: Negative.  Negative for cough, chest tightness, shortness of breath and wheezing.    Cardiovascular: Negative.  Negative for chest pain, palpitations and leg swelling.   Gastrointestinal:  Positive for abdominal pain, nausea and vomiting. Negative for abdominal distention, anal bleeding, blood in stool, constipation and diarrhea.   Endocrine: Negative.    Genitourinary: Negative.  Negative for decreased urine volume, difficulty urinating, dysuria, flank pain, frequency, hematuria, pelvic pain and urgency.   Musculoskeletal: Negative.  Negative for arthralgias, back pain, gait problem, joint swelling, myalgias, neck pain and neck stiffness.   Skin: Negative.  Negative for color change, pallor and rash.   Neurological: Negative.  Negative for dizziness, tremors, seizures, syncope, facial asymmetry, speech difficulty, weakness, light-headedness, numbness and headaches.   Hematological: Negative.  Does not bruise/bleed easily.   Psychiatric/Behavioral:  Positive for agitation. Negative for confusion and dysphoric mood. The patient is nervous/anxious.    All other systems reviewed and are negative.    Physical Exam     Initial Vitals [11/19/22 1134]   BP Pulse Resp Temp SpO2   (!) 180/107 88 18 97.8 °F (36.6 °C) 98 %      MAP       --          Physical Exam    Nursing note and vitals reviewed.  Constitutional: She is active and cooperative. She appears ill. No distress.   HENT:   Head: Normocephalic and atraumatic.   Right Ear: Tympanic membrane normal.   Left Ear: Tympanic membrane normal.   Nose: Nose normal.   Mouth/Throat: Uvula is midline, oropharynx is clear and moist and mucous membranes are normal. No oral lesions. No uvula swelling. No oropharyngeal exudate, posterior oropharyngeal edema or posterior oropharyngeal erythema.   Eyes: Conjunctivae, EOM and lids are normal. Pupils are equal, round, and reactive to light. No scleral icterus.   Neck: Trachea normal and phonation normal. Neck supple. No thyroid mass and no thyromegaly present. No stridor present. No JVD present.   Normal range of motion.   Full passive range of motion without pain.     Cardiovascular:  Normal rate, regular rhythm, normal heart sounds, intact distal pulses and normal pulses.     Exam reveals no gallop, no distant heart sounds and no friction rub.       No murmur heard.  Pulmonary/Chest: Effort normal and breath sounds normal. No accessory muscle usage or stridor. No tachypnea. No respiratory distress. She has no wheezes. She has no rhonchi. She has no rales.   Abdominal: Abdomen is soft. Bowel sounds are normal. She exhibits no distension, no pulsatile midline mass and no mass. There is generalized abdominal tenderness.   No distention, no guarding, diffuse pain however patient is distractible for pain.  Abdomen is soft and nondistended.   No right CVA tenderness.  No left CVA tenderness. There is no rigidity and no guarding.   Musculoskeletal:         General: No tenderness or edema. Normal range of motion.      Right hand: Normal. Normal range of motion. Normal strength. Normal sensation. Normal capillary refill. Normal pulse.      Left hand: Normal. Normal range of motion. Normal strength. Normal sensation. Normal capillary refill. Normal pulse.      Cervical  back: Normal, full passive range of motion without pain, normal range of motion and neck supple. No edema, erythema, rigidity or bony tenderness. No spinous process tenderness or muscular tenderness. Normal range of motion.      Thoracic back: Normal. No bony tenderness. Normal range of motion.      Lumbar back: Normal. No bony tenderness. Normal range of motion.      Right foot: Normal. Normal range of motion and normal capillary refill. No tenderness or bony tenderness. Normal pulse.      Left foot: Normal. Normal range of motion and normal capillary refill. No tenderness or bony tenderness. Normal pulse.      Comments: Pulses are 2+ throughout, cap refill is less than 2 sec throughout, extremities are nontender throughout with full range of motion. There is no spinal tenderness to palpation.     Neurological: She is alert and oriented to person, place, and time. She has normal strength. She displays normal reflexes. No cranial nerve deficit or sensory deficit. Gait normal.   No focal deficits.   Skin: Skin is warm, dry and intact. Capillary refill takes less than 2 seconds. No ecchymosis, no petechiae and no rash noted. No erythema. No pallor.   Psychiatric: Her speech is normal. Thought content normal. Her mood appears anxious. Her affect is labile. She is agitated. Cognition and memory are normal. She expresses impulsivity.       ED Course   Procedures  Labs Reviewed   CBC W/ AUTO DIFFERENTIAL - Abnormal; Notable for the following components:       Result Value    RBC 3.38 (*)     Hemoglobin 9.6 (*)     Hematocrit 27.2 (*)     MCV 81 (*)     Platelets 112 (*)     Immature Granulocytes 0.6 (*)     Lymph # 0.8 (*)     Gran % 78.9 (*)     Lymph % 12.5 (*)     All other components within normal limits   COMPREHENSIVE METABOLIC PANEL - Abnormal; Notable for the following components:    Sodium 132 (*)     Potassium 3.4 (*)     Glucose 140 (*)     BUN 40 (*)     Creatinine 4.3 (*)     Albumin 3.3 (*)     Total  Bilirubin 2.0 (*)     Alkaline Phosphatase 288 (*)     AST 66 (*)     ALT 68 (*)     eGFR 13.3 (*)     All other components within normal limits   URINALYSIS, REFLEX TO URINE CULTURE - Abnormal; Notable for the following components:    Glucose, UA 4+ (*)     Bilirubin (UA) 1+ (*)     Occult Blood UA 2+ (*)     All other components within normal limits    Narrative:     Specimen Source->Urine   URINALYSIS MICROSCOPIC - Abnormal; Notable for the following components:    RBC, UA 38 (*)     WBC, UA 25 (*)     Hyaline Casts, UA 2 (*)     Granular Casts, UA 1 (*)     All other components within normal limits    Narrative:     Specimen Source->Urine   TROPONIN I HIGH SENSITIVITY - Abnormal; Notable for the following components:    Troponin I High Sensitivity 34.7 (*)     All other components within normal limits   CULTURE, URINE   CULTURE, BLOOD   CULTURE, BLOOD   LIPASE   CK   DRUG SCREEN PANEL, URINE EMERGENCY    Narrative:     Specimen Source->Urine   SARS-COV-2 RNA AMPLIFICATION, QUAL   INFLUENZA A AND B ANTIGEN    Narrative:     Specimen Source->Nasopharyngeal Swab   TROPONIN I HIGH SENSITIVITY   DRUG SCREEN PANEL, URINE EMERGENCY   TROPONIN I HIGH SENSITIVITY   DRUG SCREEN PANEL, URINE EMERGENCY   PROCALCITONIN   TROPONIN I HIGH SENSITIVITY   POCT URINE PREGNANCY        ECG Results              EKG 12-lead (In process)  Result time 11/19/22 13:57:54      In process by Interface, Lab In [facility] (11/19/22 13:57:54)                   Narrative:    Test Reason : R10.9,    Vent. Rate : 087 BPM     Atrial Rate : 087 BPM     P-R Int : 184 ms          QRS Dur : 086 ms      QT Int : 424 ms       P-R-T Axes : 050 -31 -01 degrees     QTc Int : 510 ms    Normal sinus rhythm  Left axis deviation  Possible Anterior infarct (cited on or before 19-NOV-2022)  Prolonged QT  Abnormal ECG  When compared with ECG of 12-NOV-2022 09:36,  Questionable change in initial forces of Lateral leads    Referred By: [email or web address]   SELF            Confirmed By:                                   Imaging Results              US Abdomen Limited (Gallbladder) (Final result)  Result time 11/19/22 15:26:44      Final result by Deandre Vitale Jr., MD (11/19/22 15:26:44)                   Narrative:    Examination: Limited abdominal sonogram.    CLINICAL HISTORY: Abdominal pain.    COMPARISON: None.    TECHNIQUE: Real-time sonographic evaluation of the patient's right upper quadrant was employed utilizing both gray scale and color flow imaging.    FINDINGS: The gallbladder has been surgically resected. Liver is prominent size 17.5 cm maintaining uniform echogenicity pattern without definable mass or cyst. Normal amount of fluid is identified adjacent to the inferior edge of the liver capsule. No sonographic Perez's sign was reported. The biliary tracts are normal. Hepatic veins empty normally into the inferior vena cava. The common duct is normal in caliber 0.5 cm. The right kidney measures 11.3 cm in the with uniform echotexture. No evidence of mass or cyst. The perinephric spaces are well-maintained.    IMPRESSION:  1. Status post cholecystectomy.  2. No acute intra-abdominal abdominal findings.  3. Mild hepatomegaly.    Electronically signed by:  Deandre Vitale MD  11/19/2022 3:26 PM CST Workstation: [id number]                                     CT Abdomen Pelvis  Without Contrast (Final result)  Result time 11/19/22 14:03:09      Final result by Deandre Vitale Jr., MD (11/19/22 14:03:09)                   Narrative:    EXAMINATION:  CT ABDOMEN PELVIS WITHOUT CONTRAST    CLINICAL INDICATION: Female, 32 years old. Abdominal pain, acute, nonlocalized    TECHNIQUE:  CT scan examination of the abdomen and pelvis is performed from the domes of the diaphragm to the pubic symphysis with and without administration of intravenous contrast material.      CONTRAST: 100 cc of Omnipaque 350    COMPARISON: 10/26/2022    FINDINGS:  Lower Chest: Stable large pericardial  effusion encircles the pericardial sac which showed evidence of maximum thickness on the right posterior lateral edge measuring upwards of 4.1 cm in size. Regional areas of groundglass opacity changes and reticular opacities within the lower lobes have diminished substantially with minimal scarring in the left lower lobe projecting over the diaphragmatic surface.  Liver: Liver maintains normal size and uniform outline. No evidence of intrahepatic mass.  Bile Ducts: Normal caliber.  Gallbladder: Status post cholecystectomy.  Pancreas: Pancreas maintains normal size and overall outline. No appreciable peripancreatic inflammatory stranding.  Spleen: Normal in size and contour.  Adrenals: Normal configuration.  Kidneys and Ureters: [Advanced normal intensity evidence of prominent perinephric stranding and inflammatory response changes likely due to ongoing pyelonephritis.  Bladder: Urinary bladder is filled to capacity. There is no appreciable wall thickening.  Stomach: Distended with admixing of ingested food content.  Small Intestine: No evidence of intestinal obstruction. There is no evidence of pneumoperitoneum. There is a moderate amount of free fluid projecting below the inferior margin liver as well as in the deep pelvic cavity.  Appendix: No inflammatory changes.  Colon: Old and is normal in size and overall caliber with moderate thickening of the ascending 7 secondary to colitis being suspected.  Vessels: Abdominal aorta and inferior vena cava are normal in course and caliber.  Reproductive Organs: Uterus appears present without evidence of pathologic findings.  Lymph Nodes: No pathologic mesenteric or retroperitoneal lymph nodes.  Peritoneum: No free air, free fluid, or fluid collection.  Abdominal Wall: This evidence of diffuse body wall anasarca the maintains equal parameters as compared to previous.  Musculoskeletal: No acute abnormality or suspicious bony lesion.    IMPRESSION:  1. Abnormal left kidney  with evidence of fairly prominent perinephric stranding inflammatory response change attributed towards ongoing pyelonephritis.  2. Persistent bladder distention with evidence of prominent circumferential wall thickening attributed towards underlying cystitis..  3.. Diffuse body wall anasarca.  4. Regional areas of inflammatory stranding and consolidation basilar segments have resolved.  5. Large pericardial effusion stable.    This exam was performed according to our departmental dose-optimization program which includes automated exposure control, adjustment of the mA and/or kV according to patient size and/or use of iterative reconstruction technique.    Electronically signed by:  Deandre Vitale MD  11/19/2022 2:03 PM [facility] Workstation: [id number]                                     Medications   cefTRIAXone (ROCEPHIN) 1 g/50 mL D5W IVPB (has no administration in time range)   amLODIPine tablet 10 mg (has no administration in time range)   apixaban tablet 5 mg (has no administration in time range)   carvediloL tablet 25 mg (has no administration in time range)   cloNIDine 0.2 mg/24 hr td ptwk 1 patch (has no administration in time range)   FLUoxetine capsule 20 mg (has no administration in time range)   gabapentin capsule 100 mg (has no administration in time range)   hydrALAZINE tablet 100 mg (has no administration in time range)   insulin detemir U-100 pen 5 Units (has no administration in time range)   isosorbide mononitrate 24 hr tablet 120 mg (has no administration in time range)   levETIRAcetam tablet 500 mg (has no administration in time range)   sodium chloride 0.9% flush 10 mL (has no administration in time range)   melatonin tablet 6 mg (has no administration in time range)   morphine injection 1 mg (has no administration in time range)   ondansetron injection 4 mg (has no administration in time range)   acetaminophen tablet 650 mg (has no administration in time range)   metoclopramide HCl tablet 5 mg  (has no administration in time range)   morphine injection 2 mg (2 mg Intravenous Given 11/19/22 1315)   ondansetron injection 4 mg (4 mg Intravenous Given 11/19/22 1311)   morphine injection 2 mg (2 mg Intravenous Given 11/19/22 1558)   ondansetron injection 4 mg (4 mg Intravenous Given 11/19/22 1600)     Medical Decision Making:   Clinical Tests:   Lab Tests: Reviewed  Radiological Study: Reviewed  Medical Tests: Reviewed  ED Management:  The patient is hemodynamically improved.  After 1 dose of morphine her pain did improve and her vomiting improved after Zofran.  Later however the pain did reoccur and medications were redosed.  She vomited up food contents from dinner last night in the emergency room.  Symptoms are possibly secondary to gastroparesis.  CT scan with evidence of pericardial effusion that appears unchanged from previous study.  Patient did have an echo after this test during her previous admission to [facility] to better assess the effusion.  She is not exhibiting any evidence of pericardial tamponade.  The patient was evaluated briefly in the emergency room by her nephrologist Dr. Clark who arranged for her to undergo dialysis today.  I have discussed the case with the hospitalist provider who has assumed care and will admit.                        Clinical Impression:   Final diagnoses:  [R10.9] Abdominal pain (Primary)  [R10.13] Epigastric pain  [R11.15] Intractable cyclical vomiting syndrome        ED Disposition Condition    Observation                 Jazmin Moore MD  11/19/22 1717       Jazmin Moore MD  11/19/22 1836

## 2022-11-19 NOTE — ED NOTES
"Alert and oriented. Severe L back pain reported since "the middle of the night".  Writhing and whining on bed. Intermittent dry heaves.  Missed 2 dialysis days due to "no ride". T-TH-SA dialysis.  Alert and oriented. RCW dialysis cath with dry dressing. Non distended abdomen. MAEW. No RD. Falls precautions and allergy bracelets. Repositioned for comfort.  Monitoring in progress.   "

## 2022-11-19 NOTE — CONSULTS
INPATIENT NEPHROLOGY CONSULT   [facility]    Patient Name: Tabby Howard  Date: 11/19/2022    Reason for consultation: ESRD    Chief Complaint:   Chief Complaint   Patient presents with    Abdominal Pain    Chest Pain    Back Pain     Since 3 am, Missed HD on Thursday and today       History of Present Illness:  31 y/o AAF with a history of ESRD on HD TTS, DMII, gastroparesis, HTN, CHF, and sickle cell trait who p/w severe abdominal pain since this AM, 10/10, all over her abdomen, no exac/reliev factors, missed 2 HD treatments, with history of noncompliance with dialysis, consulted for dialysis.     Interval History:  11/19- seen in ER, severe abd pain all over but not worse with palpation/abd soft/nondistended, spike in BP, no edema on exam    Plan of Care:    Assessment:  Abdominal pain  ESRD on HD TTS with noncompliance  HTN/CHF  Hyponatremia  Hypokalemia  SHPT  Anemia of CKD    Plan:    - will f/u abd pain workup- ok with imaging with contrast if needed  - ordered HD today- continue TTS schedule  - resume home BP meds  - ordered 2-3L UF  - advise renal diet with 1.5L fluid restriction  - adjust dialysate  - check phos- not on binder  - once BP better controlled, will start SHELLEY with HD    Thank you for allowing us to participate in this patient's care. We will continue to follow.    Vital Signs:  Temp Readings from Last 3 Encounters:   11/19/22 97.8 °F (36.6 °C) (Oral)   11/15/22 98 °F (36.7 °C)   11/07/22 98.6 °F (37 °C) (Oral)       Pulse Readings from Last 3 Encounters:   11/19/22 88   11/15/22 92   11/07/22 87       BP Readings from Last 3 Encounters:   11/19/22 (!) 180/114   11/15/22 (!) 148/77   11/07/22 (!) 143/97       Weight:  Wt Readings from Last 3 Encounters:   11/19/22 59 kg (130 lb)   11/12/22 57.1 kg (125 lb 14.1 oz)   11/06/22 59.9 kg (132 lb)       Past Medical & Surgical History:  Past Medical History:   Diagnosis Date    CKD (chronic kidney disease), stage IV 4/12/2022     Diabetes mellitus due to underlying condition with unspecified complications 2022    Gastroparesis 2022    Heart failure with preserved ejection fraction 2022    EF 55% on 3/22    History of gastroesophageal reflux (GERD)     History of supraventricular tachycardia     Hyperkalemia 2022    Hypertensive emergency 2022    Sickle cell trait 2022    Type 2 diabetes mellitus        Past Surgical History:   Procedure Laterality Date     SECTION      x 3    COLONOSCOPY      COLONOSCOPY N/A 2022    Procedure: COLONOSCOPY;  Surgeon: Jagdeep Cedeno MD;  Location: [facility];  Service: Endoscopy;  Laterality: N/A;    ESOPHAGOGASTRODUODENOSCOPY N/A 10/18/2019    Procedure: ESOPHAGOGASTRODUODENOSCOPY (EGD);  Surgeon: Gianluca Mendez MD;  Location: [facility];  Service: Endoscopy;  Laterality: N/A;    ESOPHAGOGASTRODUODENOSCOPY N/A 2022    Procedure: EGD (ESOPHAGOGASTRODUODENOSCOPY);  Surgeon: Micky Paredes III, MD;  Location: [facility];  Service: Endoscopy;  Laterality: N/A;    LAPAROSCOPIC CHOLECYSTECTOMY N/A 2022    Procedure: CHOLECYSTECTOMY, LAPAROSCOPIC;  Surgeon: Grey Perez MD;  Location: [facility];  Service: General;  Laterality: N/A;    PLACEMENT OF DUAL-LUMEN VASCULAR CATHETER Left 2022    Procedure: INSERTION-CATHETER-JOSEPH;  Surgeon: Dionte Gan MD;  Location: [facility];  Service: General;  Laterality: Left;    PLACEMENT OF DUAL-LUMEN VASCULAR CATHETER Right 2022    Procedure: INSERTION-CATHETER-Hemosplit;  Surgeon: Dionte Gan MD;  Location: [facility] OR;  Service: General;  Laterality: Right;       Past Social History:  Social History     Socioeconomic History    Marital status:    Tobacco Use    Smoking status: Never    Smokeless tobacco: Never   Substance and Sexual Activity    Alcohol use: Not Currently    Drug use: No    Sexual activity: Not Currently     Partners: Male     Birth control/protection: I.U.D.     Social  Determinants of Health     Financial Resource Strain: Unknown    Difficulty of Paying Living Expenses: Patient refused   Food Insecurity: Unknown    Worried About Running Out of Food in the Last Year: Patient refused    Ran Out of Food in the Last Year: Patient refused   Transportation Needs: Unmet Transportation Needs    Lack of Transportation (Medical): Yes    Lack of Transportation (Non-Medical): Yes   Physical Activity: Inactive    Days of Exercise per Week: 0 days    Minutes of Exercise per Session: 0 min   Stress: Unknown    Feeling of Stress : Patient refused   Social Connections: Unknown    Frequency of Communication with Friends and Family: More than three times a week    Frequency of Social Gatherings with Friends and Family: More than three times a week    Attends Judaism Services: Patient refused    Active Member of Clubs or Organizations: Patient refused    Attends Club or Organization Meetings: Patient refused    Marital Status: Patient refused   Housing Stability: High Risk    Unable to Pay for Housing in the Last Year: Patient refused    Unstable Housing in the Last Year: Yes       Medications:  Scheduled Meds:   morphine  2 mg Intravenous ED 1 Time    ondansetron  4 mg Intravenous ED 1 Time     Continuous Infusions:  PRN Meds:.  No current facility-administered medications on file prior to encounter.     Current Outpatient Medications on File Prior to Encounter   Medication Sig Dispense Refill    albuterol (PROVENTIL/VENTOLIN HFA) 90 mcg/actuation inhaler Inhale 2 puffs into the lungs every 6 (six) hours as needed for Wheezing. Rescue 18 g 3    amLODIPine (NORVASC) 10 MG tablet Take 1 tablet (10 mg total) by mouth once daily. 30 tablet 11    apixaban (ELIQUIS) 5 mg Tab Take 1 tablet (5 mg total) by mouth 2 (two) times daily. 60 tablet 2    carvediloL (COREG) 25 MG tablet Take 1 tablet (25 mg total) by mouth 2 (two) times daily. 60 tablet 2    cloNIDine 0.2 mg/24 hr td ptwk (CATAPRES) 0.2 mg/24 hr  "Place 1 patch onto the skin every 7 days. 4 patch 2    FLUoxetine 20 MG capsule Take 1 capsule (20 mg total) by mouth once daily. 30 capsule 11    gabapentin (NEURONTIN) 100 MG capsule Take 1 capsule (100 mg total) by mouth 2 (two) times daily. 90 capsule 2    hydrALAZINE (APRESOLINE) 100 MG tablet Take 1 tablet (100 mg total) by mouth every 8 (eight) hours. 90 tablet 2    insulin aspart U-100 (NOVOLOG) 100 unit/mL (3 mL) InPn pen Inject 1-10 Units into the skin before meals and at bedtime as needed (Hyperglycemia). Max daily dose 40 units. 3 mL 6    insulin detemir U-100 (LEVEMIR FLEXTOUCH) 100 unit/mL (3 mL) SubQ InPn pen Inject 5 Units into the skin once daily. Discard pen after 42 days. 6 mL 2    isosorbide mononitrate (IMDUR) 60 MG 24 hr tablet Take 2 tablets (120 mg total) by mouth once daily. 30 tablet 11    levETIRAcetam (KEPPRA) 500 MG Tab Take 1 tablet (500 mg total) by mouth 2 (two) times daily. 60 tablet 11    methocarbamoL (ROBAXIN) 500 MG Tab Take 1 tablet (500 mg total) by mouth 3 (three) times daily. 90 tablet 1    ondansetron (ZOFRAN) 4 MG tablet Take 1 tablet (4 mg total) by mouth every 8 (eight) hours as needed for Nausea. 60 tablet 2    pen needle, diabetic (BD ULTRA-FINE MAGALIE PEN NEEDLE) 32 gauge x 5/32" Ndle Inject into the skin 5 times per day with insulin 100 each 12    [DISCONTINUED] atenoloL (TENORMIN) 50 MG tablet Take 1 tablet (50 mg total) by mouth every other day. 45 tablet 3    [DISCONTINUED] omeprazole (PRILOSEC) 20 MG capsule Take 2 capsules (40 mg total) by mouth once daily. for 10 days 20 capsule 0    [DISCONTINUED] torsemide (DEMADEX) 20 MG Tab Take 1 tablet (20 mg total) by mouth 2 (two) times a day. (Patient taking differently: Take 20 mg by mouth once daily.) 60 tablet 1       Allergies:  Penicillins    Past Family History:  Reviewed; refer to Hospitalist Admission Note    Review of Systems:  Review of Systems - All 14 systems reviewed and negative, except as noted in " HPI    Physical Exam:  General Appearance:    Tearful, moaning in pain, AAO x 3, appears stated age   Head:    Normocephalic, atraumatic   Eyes:    PER, EOMI, and conjunctiva/sclera clear bilaterally       Mouth:   Moist mucus membranes, no thrush or oral lesions,       normal dentition   Back:     No CVA tenderness   Lungs:     Clear to auscultation bilaterally, no wheezes, crackles,           rales or rhonchi, symmetric air movement, respirations unlabored   Chest wall:    No tenderness or deformity   Heart:    Regular rate and rhythm, S1 and S2 normal, no murmur, rub   or gallop   Abdomen:     Soft, non-tender, non-distended, bowel sounds active all four   quadrants, no RT or guarding, no masses, no organomegaly   Extremities:   Warm and well perfused, distal pulses are intact, no             cyanosis or peripheral edema   MSK:   No joint or muscle swelling, tenderness or deformity   Skin:   Skin color, texture, turgor normal, no rashes or lesions   Neurologic/Psychiatric:   CNII-XII intact, normal strength and sensation       throughout, no asterixis; normal affect, memory, judgement     and insight      Results:  Lab Results   Component Value Date     (L) 11/19/2022    K 3.4 (L) 11/19/2022    CL 95 11/19/2022    CO2 26 11/19/2022    BUN 40 (H) 11/19/2022    CREATININE 4.3 (H) 11/19/2022    CALCIUM 8.9 11/19/2022    ANIONGAP 11 11/19/2022    ESTGFRAFRICA 20 (A) 07/31/2022    EGFRNONAA 18 (A) 07/31/2022       Lab Results   Component Value Date    CALCIUM 8.9 11/19/2022    PHOS 5.4 (H) 11/15/2022       Recent Labs   Lab 11/19/22  1154   WBC 6.18   RBC 3.38*   HGB 9.6*   HCT 27.2*   *   MCV 81*   MCH 28.4   MCHC 35.3       I have personally reviewed pertinent radiological imaging and reports.    I have spent > 70 minutes providing care for this patient for the above diagnoses. These services have included chart/data/imaging review, evaluation, exam, formulation of plan, , note preparation,  and discussions with staff involved in this patient's care.    Prateek Clark MD MPH  [facility] [address] [address]  [phone] (p)  [phone] (f)

## 2022-11-20 ENCOUNTER — CLINICAL SUPPORT (OUTPATIENT)
Dept: CARDIOLOGY | Facility: HOSPITAL | Age: 32
DRG: 073 | End: 2022-11-20
Payer: MEDICAID

## 2022-11-20 VITALS — BODY MASS INDEX: 22.71 KG/M2 | WEIGHT: 123.44 LBS | HEIGHT: 62 IN

## 2022-11-20 LAB
ALBUMIN SERPL BCP-MCNC: 2.8 G/DL (ref 3.5–5.2)
ALP SERPL-CCNC: 235 U/L (ref 55–135)
ALT SERPL W/O P-5'-P-CCNC: 49 U/L (ref 10–44)
ANION GAP SERPL CALC-SCNC: 8 MMOL/L (ref 8–16)
AST SERPL-CCNC: 38 U/L (ref 10–40)
BASOPHILS # BLD AUTO: 0.02 K/UL (ref 0–0.2)
BASOPHILS NFR BLD: 0.5 % (ref 0–1.9)
BILIRUB SERPL-MCNC: 1.6 MG/DL (ref 0.1–1)
BSA FOR ECHO PROCEDURE: 1.57 M2
BUN SERPL-MCNC: 22 MG/DL (ref 6–20)
CALCIUM SERPL-MCNC: 8.3 MG/DL (ref 8.7–10.5)
CHLORIDE SERPL-SCNC: 99 MMOL/L (ref 95–110)
CO2 SERPL-SCNC: 26 MMOL/L (ref 23–29)
CREAT SERPL-MCNC: 2.9 MG/DL (ref 0.5–1.4)
DIFFERENTIAL METHOD: ABNORMAL
EJECTION FRACTION: 60 %
EOSINOPHIL # BLD AUTO: 0.1 K/UL (ref 0–0.5)
EOSINOPHIL NFR BLD: 1.1 % (ref 0–8)
ERYTHROCYTE [DISTWIDTH] IN BLOOD BY AUTOMATED COUNT: 14.5 % (ref 11.5–14.5)
EST. GFR  (NO RACE VARIABLE): 21.3 ML/MIN/1.73 M^2
GLUCOSE SERPL-MCNC: 179 MG/DL (ref 70–110)
GLUCOSE SERPL-MCNC: 212 MG/DL (ref 70–110)
GLUCOSE SERPL-MCNC: 270 MG/DL (ref 70–110)
GLUCOSE SERPL-MCNC: 326 MG/DL (ref 70–110)
GLUCOSE SERPL-MCNC: 329 MG/DL (ref 70–110)
HCT VFR BLD AUTO: 27.2 % (ref 37–48.5)
HGB BLD-MCNC: 9.5 G/DL (ref 12–16)
IMM GRANULOCYTES # BLD AUTO: 0.02 K/UL (ref 0–0.04)
IMM GRANULOCYTES NFR BLD AUTO: 0.5 % (ref 0–0.5)
LYMPHOCYTES # BLD AUTO: 0.9 K/UL (ref 1–4.8)
LYMPHOCYTES NFR BLD: 20.1 % (ref 18–48)
MCH RBC QN AUTO: 28.5 PG (ref 27–31)
MCHC RBC AUTO-ENTMCNC: 34.9 G/DL (ref 32–36)
MCV RBC AUTO: 82 FL (ref 82–98)
MONOCYTES # BLD AUTO: 0.4 K/UL (ref 0.3–1)
MONOCYTES NFR BLD: 9.7 % (ref 4–15)
NEUTROPHILS # BLD AUTO: 3 K/UL (ref 1.8–7.7)
NEUTROPHILS NFR BLD: 68.1 % (ref 38–73)
NRBC BLD-RTO: 0 /100 WBC
PHOSPHATE SERPL-MCNC: 2.9 MG/DL (ref 2.7–4.5)
PLATELET # BLD AUTO: 111 K/UL (ref 150–450)
PLATELET BLD QL SMEAR: ABNORMAL
PMV BLD AUTO: 8.9 FL (ref 9.2–12.9)
POTASSIUM SERPL-SCNC: 4.1 MMOL/L (ref 3.5–5.1)
PROT SERPL-MCNC: 6.2 G/DL (ref 6–8.4)
RBC # BLD AUTO: 3.33 M/UL (ref 4–5.4)
SODIUM SERPL-SCNC: 133 MMOL/L (ref 136–145)
TROPONIN I SERPL HS-MCNC: 30.1 PG/ML (ref 0–14.9)
WBC # BLD AUTO: 4.42 K/UL (ref 3.9–12.7)

## 2022-11-20 PROCEDURE — 12000002 HC ACUTE/MED SURGE SEMI-PRIVATE ROOM

## 2022-11-20 PROCEDURE — 99223 1ST HOSP IP/OBS HIGH 75: CPT | Mod: ,,, | Performed by: SPECIALIST

## 2022-11-20 PROCEDURE — 80053 COMPREHEN METABOLIC PANEL: CPT | Performed by: NURSE PRACTITIONER

## 2022-11-20 PROCEDURE — 93308 TTE F-UP OR LMTD: CPT | Mod: 26,,, | Performed by: SPECIALIST

## 2022-11-20 PROCEDURE — 99900035 HC TECH TIME PER 15 MIN (STAT)

## 2022-11-20 PROCEDURE — 25000003 PHARM REV CODE 250: Performed by: NURSE PRACTITIONER

## 2022-11-20 PROCEDURE — 93308 ECHO (CUPID ONLY): ICD-10-PCS | Mod: 26,,, | Performed by: SPECIALIST

## 2022-11-20 PROCEDURE — 93321 DOPPLER ECHO F-UP/LMTD STD: CPT | Mod: 26,,, | Performed by: SPECIALIST

## 2022-11-20 PROCEDURE — 63600175 PHARM REV CODE 636 W HCPCS: Performed by: INTERNAL MEDICINE

## 2022-11-20 PROCEDURE — 85025 COMPLETE CBC W/AUTO DIFF WBC: CPT | Performed by: NURSE PRACTITIONER

## 2022-11-20 PROCEDURE — 84484 ASSAY OF TROPONIN QUANT: CPT | Performed by: NURSE PRACTITIONER

## 2022-11-20 PROCEDURE — 63600175 PHARM REV CODE 636 W HCPCS: Performed by: NURSE PRACTITIONER

## 2022-11-20 PROCEDURE — 84100 ASSAY OF PHOSPHORUS: CPT | Performed by: NURSE PRACTITIONER

## 2022-11-20 PROCEDURE — G0378 HOSPITAL OBSERVATION PER HR: HCPCS

## 2022-11-20 PROCEDURE — 96367 TX/PROPH/DG ADDL SEQ IV INF: CPT

## 2022-11-20 PROCEDURE — 99223 PR INITIAL HOSPITAL CARE,LEVL III: ICD-10-PCS | Mod: ,,, | Performed by: SPECIALIST

## 2022-11-20 PROCEDURE — 25000003 PHARM REV CODE 250: Performed by: INTERNAL MEDICINE

## 2022-11-20 PROCEDURE — 93321 ECHO (CUPID ONLY): ICD-10-PCS | Mod: 26,,, | Performed by: SPECIALIST

## 2022-11-20 PROCEDURE — 94761 N-INVAS EAR/PLS OXIMETRY MLT: CPT

## 2022-11-20 PROCEDURE — 96372 THER/PROPH/DIAG INJ SC/IM: CPT | Performed by: NURSE PRACTITIONER

## 2022-11-20 PROCEDURE — 36415 COLL VENOUS BLD VENIPUNCTURE: CPT | Performed by: NURSE PRACTITIONER

## 2022-11-20 PROCEDURE — 27000221 HC OXYGEN, UP TO 24 HOURS

## 2022-11-20 PROCEDURE — 96376 TX/PRO/DX INJ SAME DRUG ADON: CPT

## 2022-11-20 PROCEDURE — 93308 TTE F-UP OR LMTD: CPT

## 2022-11-20 RX ORDER — LEVOFLOXACIN 5 MG/ML
250 INJECTION, SOLUTION INTRAVENOUS
Status: DISCONTINUED | OUTPATIENT
Start: 2022-11-21 | End: 2022-11-22

## 2022-11-20 RX ORDER — HYDROCODONE BITARTRATE AND ACETAMINOPHEN 5; 325 MG/1; MG/1
1 TABLET ORAL EVERY 4 HOURS PRN
Status: DISCONTINUED | OUTPATIENT
Start: 2022-11-20 | End: 2022-11-22

## 2022-11-20 RX ORDER — LEVOFLOXACIN 5 MG/ML
500 INJECTION, SOLUTION INTRAVENOUS ONCE
Status: COMPLETED | OUTPATIENT
Start: 2022-11-20 | End: 2022-11-20

## 2022-11-20 RX ADMIN — APIXABAN 5 MG: 5 TABLET, FILM COATED ORAL at 09:11

## 2022-11-20 RX ADMIN — CLONIDINE 1 PATCH: 0.2 PATCH TRANSDERMAL at 10:11

## 2022-11-20 RX ADMIN — INSULIN DETEMIR 5 UNITS: 100 INJECTION, SOLUTION SUBCUTANEOUS at 10:11

## 2022-11-20 RX ADMIN — HYDROCODONE BITARTRATE AND ACETAMINOPHEN 1 TABLET: 5; 325 TABLET ORAL at 01:11

## 2022-11-20 RX ADMIN — HYDRALAZINE HYDROCHLORIDE 100 MG: 25 TABLET ORAL at 04:11

## 2022-11-20 RX ADMIN — ISOSORBIDE MONONITRATE 120 MG: 30 TABLET, EXTENDED RELEASE ORAL at 10:11

## 2022-11-20 RX ADMIN — INSULIN ASPART 2 UNITS: 100 INJECTION, SOLUTION INTRAVENOUS; SUBCUTANEOUS at 09:11

## 2022-11-20 RX ADMIN — HYDROCODONE BITARTRATE AND ACETAMINOPHEN 1 TABLET: 5; 325 TABLET ORAL at 09:11

## 2022-11-20 RX ADMIN — HYDRALAZINE HYDROCHLORIDE 100 MG: 25 TABLET ORAL at 01:11

## 2022-11-20 RX ADMIN — METOCLOPRAMIDE HYDROCHLORIDE 5 MG: 5 TABLET ORAL at 04:11

## 2022-11-20 RX ADMIN — AMLODIPINE BESYLATE 10 MG: 5 TABLET ORAL at 10:11

## 2022-11-20 RX ADMIN — INSULIN ASPART 2 UNITS: 100 INJECTION, SOLUTION INTRAVENOUS; SUBCUTANEOUS at 10:11

## 2022-11-20 RX ADMIN — CARVEDILOL 25 MG: 25 TABLET, FILM COATED ORAL at 10:11

## 2022-11-20 RX ADMIN — LEVOFLOXACIN 500 MG: 500 INJECTION, SOLUTION INTRAVENOUS at 11:11

## 2022-11-20 RX ADMIN — FLUOXETINE 20 MG: 20 CAPSULE ORAL at 10:11

## 2022-11-20 RX ADMIN — CARVEDILOL 25 MG: 25 TABLET, FILM COATED ORAL at 09:11

## 2022-11-20 RX ADMIN — HYDRALAZINE HYDROCHLORIDE 100 MG: 25 TABLET ORAL at 09:11

## 2022-11-20 RX ADMIN — MORPHINE SULFATE 1 MG: 2 INJECTION, SOLUTION INTRAMUSCULAR; INTRAVENOUS at 11:11

## 2022-11-20 RX ADMIN — LEVETIRACETAM 500 MG: 500 TABLET, FILM COATED ORAL at 09:11

## 2022-11-20 RX ADMIN — METOCLOPRAMIDE HYDROCHLORIDE 5 MG: 5 TABLET ORAL at 10:11

## 2022-11-20 RX ADMIN — GABAPENTIN 100 MG: 100 CAPSULE ORAL at 10:11

## 2022-11-20 RX ADMIN — LEVETIRACETAM 500 MG: 500 TABLET, FILM COATED ORAL at 10:11

## 2022-11-20 RX ADMIN — MORPHINE SULFATE 1 MG: 2 INJECTION, SOLUTION INTRAMUSCULAR; INTRAVENOUS at 04:11

## 2022-11-20 RX ADMIN — GABAPENTIN 100 MG: 100 CAPSULE ORAL at 09:11

## 2022-11-20 RX ADMIN — APIXABAN 5 MG: 5 TABLET, FILM COATED ORAL at 10:11

## 2022-11-20 NOTE — PROGRESS NOTES
Pharmacist Renal Dose Adjustment Note    Tabby Howard is a 32 y.o. female being treated with the medication levofloxacin    Patient Data:    Vital Signs (Most Recent):  Temp: 98.3 °F (36.8 °C) (11/20/22 1108)  Pulse: 90 (11/20/22 1108)  Resp: 18 (11/20/22 1108)  BP: (!) 157/105 (11/20/22 1108)  SpO2: 98 % (11/20/22 1108)   Vital Signs (72h Range):  Temp:  [97.5 °F (36.4 °C)-98.5 °F (36.9 °C)]   Pulse:  [84-96]   Resp:  [16-24]   BP: (147-187)/()   SpO2:  [98 %-100 %]      Recent Labs   Lab 11/15/22  0552 11/19/22  1154 11/20/22  0210   CREATININE 4.2* 4.3* 2.9*     Serum creatinine: 2.9 mg/dL (H) 11/20/22 0210  Estimated creatinine clearance: 21.8 mL/min (A)    LEVOFLOXACIN 500 MG IVPB EVERY 24 HOURS will be changed to LEVOFLOXACIN 500 MG ONCE, THEN 250 MG IVPB EVERY 24 HOURS.    Pharmacist's Name: Nancie Bhatia  Pharmacist's Extension: [phone]

## 2022-11-20 NOTE — PROGRESS NOTES
[facility] Medicine  Progress Note    Patient Name: Tabby Howard  MRN: [id number]  Patient Class: OP- Observation   Admission Date: 11/19/2022  Length of Stay: 0 days  Attending Physician: Armani Gurrola MD  Primary Care Provider: [facility]        Subjective:     Principal Problem:Gastroparesis    Interval History:     Patient was seen and examined  Entered into the patient room and patient was asleep.  I waked her up and suddenly having pain in the abdomen  CT scan findings noted and started the IV antibiotics for possible pyelonephritis    Review of Systems  Objective:     Vital Signs (Most Recent):  Temp: 98.3 °F (36.8 °C) (11/20/22 1108)  Pulse: 90 (11/20/22 1108)  Resp: 20 (11/20/22 1354)  BP: (!) 157/105 (11/20/22 1108)  SpO2: 98 % (11/20/22 1108)   Vital Signs (24h Range):  Temp:  [97.5 °F (36.4 °C)-98.5 °F (36.9 °C)] 98.3 °F (36.8 °C)  Pulse:  [84-96] 90  Resp:  [16-22] 20  SpO2:  [98 %-100 %] 98 %  BP: (147-181)/() 157/105     Weight: 56 kg (123 lb 7.3 oz)  Body mass index is 22.58 kg/m².    Intake/Output Summary (Last 24 hours) at 11/20/2022 1449  Last data filed at 11/19/2022 1925  Gross per 24 hour   Intake 250 ml   Output 3500 ml   Net -3250 ml      Physical Exam    General: Patient resting comfortably in no acute distress. Appears as stated age. Calm  Eyes: EOM intact. No conjunctivae injection. No scleral icterus.  ENT: Hearing grossly intact. No discharge from ears. No nasal discharge.   CVS: RRR. No LE edema BL.  Lungs: decreased breath sounds. No accessory muscle use. No acute respiratory distress  Neuro: non focal , Follows commands. Responds appropriately       Significant Labs: All pertinent labs within the past 24 hours have been reviewed.  BMP:   Recent Labs   Lab 11/20/22  0210   *   *   K 4.1   CL 99   CO2 26   BUN 22*   CREATININE 2.9*   CALCIUM 8.3*     CBC:   Recent Labs   Lab 11/19/22  1154 11/20/22  0864    WBC 6.18 4.42   HGB 9.6* 9.5*   HCT 27.2* 27.2*   * 111*     CMP:   Recent Labs   Lab 11/19/22  1154 11/20/22  0210   * 133*   K 3.4* 4.1   CL 95 99   CO2 26 26   * 179*   BUN 40* 22*   CREATININE 4.3* 2.9*   CALCIUM 8.9 8.3*   PROT 7.0 6.2   ALBUMIN 3.3* 2.8*   BILITOT 2.0* 1.6*   ALKPHOS 288* 235*   AST 66* 38   ALT 68* 49*   ANIONGAP 11 8       Significant Imaging: I have reviewed all pertinent imaging results/findings within the past 24 hours.    Assessment/Plan:      Active Diagnoses:    Diagnosis Date Noted POA    PRINCIPAL PROBLEM:  Gastroparesis - suspected, unconfirmed [K31.84] 04/12/2022 Yes     Chronic      Problems Resolved During this Admission:        ASSESSMENT AND PLAN      Intractable Nausea with vomiting Generalized abdominal pain   Chronic condition /possible pyelonephritis in CT   CT abdomen and pelvis without contrast results reviewed, no acute intra-abdominal process noted. Diffused edema s/t volume overload  Symptoms are likely related to underlying gastroparesis  and possible pyelonephritis   IV  levaquin   Urine culture         2. ESRD (end stage renal disease)  hemodialysis as per Dr Clark       3. Gastroparesis  Reglan IV   PPI     4. H/O Deep vein thrombosis (DVT) of non-extremity vein  Continue Eliquis       5. Seizure  Continue Keppra   seizure precautions.        6. Diabetes mellitus due to underlying condition with unspecified                 complications  Low dose NovoLog insulin sliding scale  Hypoglycemic protocol  Diabetic cardiac renal diet once eating     7. Chronic Moderate Pericardial  Effusion      cardiology consulted . Suspicious for cardiac amyloidosis in the past            8. HTN (hypertension)  Continue use of home medications to manage  Use intravenous hydralazine 10 mg IV q.6 hours p.r.n. for systolic blood pressure above 160 mmHg.     VTE Risk Mitigation (From admission, onward)           Ordered     apixaban tablet 5 mg  2 times daily          11/19/22 1821     Reason for No Pharmacological VTE Prophylaxis  Once        Question:  Reasons:  Answer:  Already adequately anticoagulated on oral Anticoagulants    11/19/22 1821     IP VTE HIGH RISK PATIENT  Once         11/19/22 1821                       Armani Gurrola MD  Department of Hospital Medicine   [facility]

## 2022-11-20 NOTE — PLAN OF CARE
Plan of care reviewed with patient but she is presently in too much pain to be able to focus on teaching.

## 2022-11-20 NOTE — PROGRESS NOTES
Pharmacist Renal Dose Adjustment Note    Tabby Howard is a 32 y.o. female being treated with the medication metoclopramide    Patient Data:    Vital Signs (Most Recent):  Temp: 98.1 °F (36.7 °C) (11/19/22 1610)  Pulse: 86 (11/19/22 1815)  Resp: (!) 22 (11/19/22 1610)  BP: (!) 153/100 (11/19/22 1815)  SpO2: 98 % (11/19/22 1610)   Vital Signs (72h Range):  Temp:  [97.8 °F (36.6 °C)-98.1 °F (36.7 °C)]   Pulse:  [86-95]   Resp:  [16-24]   BP: (153-187)/()   SpO2:  [98 %-99 %]      Recent Labs   Lab 11/14/22  1029 11/15/22  0552 11/19/22  1154   CREATININE 2.8* 4.2* 4.3*     Serum creatinine: 4.3 mg/dL (H) 11/19/22 1154  Estimated creatinine clearance: 14.7 mL/min (A)    Medication:metoclopramide 10 mg will be changed to 5 mg for crcl < 60 ml/min per [facility] renal dosing protocol.    Pharmacist's Name: Hugh Restrepo  Pharmacist's Extension: [phone]

## 2022-11-20 NOTE — PROGRESS NOTES
INPATIENT NEPHROLOGY CONSULT   [facility]    Patient Name: Tabby Howard  Date: 11/20/2022    Reason for consultation: ESRD    Chief Complaint:   Chief Complaint   Patient presents with    Abdominal Pain    Chest Pain    Back Pain     Since 3 am, Missed HD on Thursday and today       History of Present Illness:  31 y/o AAF with a history of ESRD on HD TTS, DMII, gastroparesis, HTN, CHF, and sickle cell trait who p/w severe abdominal pain since this AM, 10/10, all over her abdomen, no exac/reliev factors, missed 2 HD treatments, with history of noncompliance with dialysis, consulted for dialysis.     Interval History:  11/19- seen in ER, severe abd pain all over but not worse with palpation/abd soft/nondistended, spike in BP, no edema on exam  11/20- got off 3L with HD, u/s abd negative, severe abd pain    CT AP  1. Abnormal left kidney with evidence of fairly prominent perinephric stranding inflammatory response change attributed towards ongoing pyelonephritis.  2. Persistent bladder distention with evidence of prominent circumferential wall thickening attributed towards underlying cystitis..  3.. Diffuse body wall anasarca.  4. Large pericardial effusion stable.    Plan of Care:    Assessment:  Abdominal pain- suspect UTI/pyelo, bowel edema, and gastroparesis  ESRD on HD TTS with noncompliance  HTN/CHF- pericardial effusion  Hyponatremia  Hypokalemia  SHPT  Anemia of CKD    Plan:    - would treat for UTI/pyelo- continue to push UF with HD (her non compliance with dialysis with missed treatments is a contributing factor)- continue gastroparesis management- may need some IV pain medication- she really looks miserable  - continue HD TTS schedule  - continue home BP meds  - continue 2-3L UF- f/u echo regarding pericardial effusion- noncompliance with dialysis is likely the culprit for this effusion- work on optimizing nutrition for fluid third spacing  - continue renal diet with 1.5L fluid  restriction  - adjust dialysate  - check phos- not on binder  - start SHELLEY with HD    Thank you for allowing us to participate in this patient's care. We will continue to follow.    Vital Signs:  Temp Readings from Last 3 Encounters:   11/20/22 98.2 °F (36.8 °C) (Oral)   11/15/22 98 °F (36.7 °C)   11/07/22 98.6 °F (37 °C) (Oral)       Pulse Readings from Last 3 Encounters:   11/20/22 85   11/15/22 92   11/07/22 87       BP Readings from Last 3 Encounters:   11/20/22 (!) 160/103   11/15/22 (!) 148/77   11/07/22 (!) 143/97       Weight:  Wt Readings from Last 3 Encounters:   11/19/22 56 kg (123 lb 7.3 oz)   11/12/22 57.1 kg (125 lb 14.1 oz)   11/06/22 59.9 kg (132 lb)         Medications:  Scheduled Meds:   amLODIPine  10 mg Oral Daily    apixaban  5 mg Oral BID    carvediloL  25 mg Oral BID    cloNIDine 0.2 mg/24 hr td ptwk  1 patch Transdermal Q7 Days    FLUoxetine  20 mg Oral Daily    gabapentin  100 mg Oral BID    hydrALAZINE  100 mg Oral Q8H    insulin detemir U-100  5 Units Subcutaneous Daily    isosorbide mononitrate  120 mg Oral Daily    levETIRAcetam  500 mg Oral BID    metoclopramide HCl  5 mg Oral TID AC     Continuous Infusions:  PRN Meds:.  No current facility-administered medications on file prior to encounter.     Current Outpatient Medications on File Prior to Encounter   Medication Sig Dispense Refill    albuterol (PROVENTIL/VENTOLIN HFA) 90 mcg/actuation inhaler Inhale 2 puffs into the lungs every 6 (six) hours as needed for Wheezing. Rescue 18 g 3    amLODIPine (NORVASC) 10 MG tablet Take 1 tablet (10 mg total) by mouth once daily. 30 tablet 11    apixaban (ELIQUIS) 5 mg Tab Take 1 tablet (5 mg total) by mouth 2 (two) times daily. 60 tablet 2    carvediloL (COREG) 25 MG tablet Take 1 tablet (25 mg total) by mouth 2 (two) times daily. 60 tablet 2    cloNIDine 0.2 mg/24 hr td ptwk (CATAPRES) 0.2 mg/24 hr Place 1 patch onto the skin every 7 days. 4 patch 2    FLUoxetine 20 MG capsule Take 1 capsule (20  "mg total) by mouth once daily. 30 capsule 11    gabapentin (NEURONTIN) 100 MG capsule Take 1 capsule (100 mg total) by mouth 2 (two) times daily. 90 capsule 2    hydrALAZINE (APRESOLINE) 100 MG tablet Take 1 tablet (100 mg total) by mouth every 8 (eight) hours. 90 tablet 2    insulin aspart U-100 (NOVOLOG) 100 unit/mL (3 mL) InPn pen Inject 1-10 Units into the skin before meals and at bedtime as needed (Hyperglycemia). Max daily dose 40 units. 3 mL 6    insulin detemir U-100 (LEVEMIR FLEXTOUCH) 100 unit/mL (3 mL) SubQ InPn pen Inject 5 Units into the skin once daily. Discard pen after 42 days. 6 mL 2    isosorbide mononitrate (IMDUR) 60 MG 24 hr tablet Take 2 tablets (120 mg total) by mouth once daily. 30 tablet 11    levETIRAcetam (KEPPRA) 500 MG Tab Take 1 tablet (500 mg total) by mouth 2 (two) times daily. 60 tablet 11    methocarbamoL (ROBAXIN) 500 MG Tab Take 1 tablet (500 mg total) by mouth 3 (three) times daily. 90 tablet 1    ondansetron (ZOFRAN) 4 MG tablet Take 1 tablet (4 mg total) by mouth every 8 (eight) hours as needed for Nausea. 60 tablet 2    pantoprazole (PROTONIX) 40 MG tablet Take 40 mg by mouth once daily.      pen needle, diabetic (BD ULTRA-FINE MAGALIE PEN NEEDLE) 32 gauge x 5/32" Ndle Inject into the skin 5 times per day with insulin 100 each 12    [DISCONTINUED] atenoloL (TENORMIN) 50 MG tablet Take 1 tablet (50 mg total) by mouth every other day. 45 tablet 3    [DISCONTINUED] omeprazole (PRILOSEC) 20 MG capsule Take 2 capsules (40 mg total) by mouth once daily. for 10 days 20 capsule 0    [DISCONTINUED] torsemide (DEMADEX) 20 MG Tab Take 1 tablet (20 mg total) by mouth 2 (two) times a day. (Patient taking differently: Take 20 mg by mouth once daily.) 60 tablet 1       Review of Systems:  Neg    Physical Exam:  Constitutional: tearful, aao x 3  Heart: rrr, no m/r/g, wwp, no edema  Lungs: ctab, no w/r/r/c, no lb  Abdomen: s/nt/nd, +BS    Results:  Lab Results   Component Value Date     (L) " 11/20/2022    K 4.1 11/20/2022    CL 99 11/20/2022    CO2 26 11/20/2022    BUN 22 (H) 11/20/2022    CREATININE 2.9 (H) 11/20/2022    CALCIUM 8.3 (L) 11/20/2022    ANIONGAP 8 11/20/2022    ESTGFRAFRICA 20 (A) 07/31/2022    EGFRNONAA 18 (A) 07/31/2022       Lab Results   Component Value Date    CALCIUM 8.3 (L) 11/20/2022    PHOS 5.4 (H) 11/15/2022       Recent Labs   Lab 11/20/22  0210   WBC 4.42   RBC 3.33*   HGB 9.5*   HCT 27.2*   *   MCV 82   MCH 28.5   MCHC 34.9       I have personally reviewed pertinent radiological imaging and reports.    I have spent > 35 minutes providing care for this patient for the above diagnoses. These services have included chart/data/imaging review, evaluation, exam, formulation of plan, , note preparation, and discussions with staff involved in this patient's care.    Prateek Clark MD MPH  [facility] [place]  [address] [place] [address]  JEANMARIE Connolly [address]  [phone] (p)  [phone] (f)

## 2022-11-20 NOTE — PROGRESS NOTES
11/19/22 1925   Handoff Report   Given To Gianluca   Vital Signs   Temp 98 °F (36.7 °C)   Temp src Axillary   Pulse 84   Heart Rate Source Monitor   Resp 16   SpO2 98 %   Pulse Oximetry Type Intermittent   Flow (L/min) 2   O2 Device (Oxygen Therapy) nasal cannula   BP (!) 147/96   BP Location Right arm   BP Method Automatic   Patient Position Lying        Hemodialysis Catheter 07/26/22 1457 right internal jugular   Placement Date/Time: 07/26/22 1457   Present Prior to Hospital Arrival?: No  Hand Hygiene: Performed  Barrier Precautions: Performed  Skin Antisepsis: ChloraPrep  Hemodialysis Catheter Type: Tunneled catheter  Location: right internal jugular  Cathete...   Line Necessity Review CRRT/HD   Site Assessment No drainage;No redness;No swelling;No warmth   Line Securement Device Secured with sutures   Dressing Type Biopatch in place;Central line dressing   Dressing Status Clean;Dry;Intact   Dressing Intervention Integrity maintained  (Pt refused dressing change)   Venous Patency/Care flushed w/o difficulty;normal saline locked   Arterial Patency/Care flushed w/o difficulty;normal saline locked   Post-Hemodialysis Assessment   Rinseback Volume (mL) 250 mL   Blood Volume Processed (Liters) 53.9 L   Dialyzer Clearance Lightly streaked   Duration of Treatment 180 minutes   Additional Fluid Intake (mL) 250 mL   Total UF (mL) 3500 mL   Net Fluid Removal 3000   Patient Response to Treatment Tolerated   Post-Treatment Weight   (Unable to obtain, pt on stretcher)   Post-Hemodialysis Comments Hd completed per orders and protocol. Pt refused CVC dressing change. No distress noted from pt. No change in patients status

## 2022-11-20 NOTE — CARE UPDATE
11/20/22 0827   PRE-TX-O2   O2 Device (Oxygen Therapy) nasal cannula   $ Is the patient on Low Flow Oxygen? Yes   Flow (L/min) 2   SpO2 99 %   Pulse Oximetry Type Intermittent   $ Pulse Oximetry - Multiple Charge Pulse Oximetry - Multiple   Pulse 85   Resp 17   Respiratory Evaluation   $ Care Plan Tech Time 15 min

## 2022-11-21 LAB
ALBUMIN SERPL BCP-MCNC: 2.8 G/DL (ref 3.5–5.2)
ALP SERPL-CCNC: 212 U/L (ref 55–135)
ALT SERPL W/O P-5'-P-CCNC: 39 U/L (ref 10–44)
ANION GAP SERPL CALC-SCNC: 10 MMOL/L (ref 8–16)
AST SERPL-CCNC: 27 U/L (ref 10–40)
BASOPHILS # BLD AUTO: 0.02 K/UL (ref 0–0.2)
BASOPHILS NFR BLD: 0.4 % (ref 0–1.9)
BILIRUB SERPL-MCNC: 1.3 MG/DL (ref 0.1–1)
BUN SERPL-MCNC: 38 MG/DL (ref 6–20)
CALCIUM SERPL-MCNC: 8.1 MG/DL (ref 8.7–10.5)
CHLORIDE SERPL-SCNC: 94 MMOL/L (ref 95–110)
CO2 SERPL-SCNC: 25 MMOL/L (ref 23–29)
CREAT SERPL-MCNC: 4.4 MG/DL (ref 0.5–1.4)
DIFFERENTIAL METHOD: ABNORMAL
EOSINOPHIL # BLD AUTO: 0.1 K/UL (ref 0–0.5)
EOSINOPHIL NFR BLD: 2.1 % (ref 0–8)
ERYTHROCYTE [DISTWIDTH] IN BLOOD BY AUTOMATED COUNT: 14.3 % (ref 11.5–14.5)
EST. GFR  (NO RACE VARIABLE): 12.9 ML/MIN/1.73 M^2
GLUCOSE SERPL-MCNC: 244 MG/DL (ref 70–110)
GLUCOSE SERPL-MCNC: 278 MG/DL (ref 70–110)
GLUCOSE SERPL-MCNC: 357 MG/DL (ref 70–110)
GLUCOSE SERPL-MCNC: 414 MG/DL (ref 70–110)
GLUCOSE SERPL-MCNC: 416 MG/DL (ref 70–110)
GLUCOSE SERPL-MCNC: 522 MG/DL (ref 70–110)
HCT VFR BLD AUTO: 25.1 % (ref 37–48.5)
HGB BLD-MCNC: 8.5 G/DL (ref 12–16)
IMM GRANULOCYTES # BLD AUTO: 0.02 K/UL (ref 0–0.04)
IMM GRANULOCYTES NFR BLD AUTO: 0.4 % (ref 0–0.5)
LYMPHOCYTES # BLD AUTO: 0.8 K/UL (ref 1–4.8)
LYMPHOCYTES NFR BLD: 17.6 % (ref 18–48)
MCH RBC QN AUTO: 28 PG (ref 27–31)
MCHC RBC AUTO-ENTMCNC: 33.9 G/DL (ref 32–36)
MCV RBC AUTO: 83 FL (ref 82–98)
MONOCYTES # BLD AUTO: 0.4 K/UL (ref 0.3–1)
MONOCYTES NFR BLD: 8 % (ref 4–15)
NEUTROPHILS # BLD AUTO: 3.4 K/UL (ref 1.8–7.7)
NEUTROPHILS NFR BLD: 71.5 % (ref 38–73)
NRBC BLD-RTO: 0 /100 WBC
PLATELET # BLD AUTO: 105 K/UL (ref 150–450)
PMV BLD AUTO: 9.6 FL (ref 9.2–12.9)
POTASSIUM SERPL-SCNC: 4.2 MMOL/L (ref 3.5–5.1)
PROT SERPL-MCNC: 6.1 G/DL (ref 6–8.4)
RBC # BLD AUTO: 3.04 M/UL (ref 4–5.4)
SODIUM SERPL-SCNC: 129 MMOL/L (ref 136–145)
WBC # BLD AUTO: 4.76 K/UL (ref 3.9–12.7)

## 2022-11-21 PROCEDURE — 63600175 PHARM REV CODE 636 W HCPCS: Performed by: INTERNAL MEDICINE

## 2022-11-21 PROCEDURE — 12000002 HC ACUTE/MED SURGE SEMI-PRIVATE ROOM

## 2022-11-21 PROCEDURE — 25000003 PHARM REV CODE 250: Performed by: NURSE PRACTITIONER

## 2022-11-21 PROCEDURE — 36415 COLL VENOUS BLD VENIPUNCTURE: CPT | Performed by: INTERNAL MEDICINE

## 2022-11-21 PROCEDURE — 25000003 PHARM REV CODE 250: Performed by: INTERNAL MEDICINE

## 2022-11-21 PROCEDURE — 85025 COMPLETE CBC W/AUTO DIFF WBC: CPT | Performed by: NURSE PRACTITIONER

## 2022-11-21 PROCEDURE — 80053 COMPREHEN METABOLIC PANEL: CPT | Performed by: NURSE PRACTITIONER

## 2022-11-21 PROCEDURE — 82947 ASSAY GLUCOSE BLOOD QUANT: CPT | Performed by: INTERNAL MEDICINE

## 2022-11-21 PROCEDURE — 36415 COLL VENOUS BLD VENIPUNCTURE: CPT | Performed by: NURSE PRACTITIONER

## 2022-11-21 RX ADMIN — AMLODIPINE BESYLATE 10 MG: 5 TABLET ORAL at 10:11

## 2022-11-21 RX ADMIN — LEVETIRACETAM 500 MG: 500 TABLET, FILM COATED ORAL at 10:11

## 2022-11-21 RX ADMIN — HYDROCODONE BITARTRATE AND ACETAMINOPHEN 1 TABLET: 5; 325 TABLET ORAL at 05:11

## 2022-11-21 RX ADMIN — HYDROCODONE BITARTRATE AND ACETAMINOPHEN 1 TABLET: 5; 325 TABLET ORAL at 06:11

## 2022-11-21 RX ADMIN — INSULIN DETEMIR 5 UNITS: 100 INJECTION, SOLUTION SUBCUTANEOUS at 10:11

## 2022-11-21 RX ADMIN — HYDRALAZINE HYDROCHLORIDE 100 MG: 25 TABLET ORAL at 06:11

## 2022-11-21 RX ADMIN — HYDROCODONE BITARTRATE AND ACETAMINOPHEN 1 TABLET: 5; 325 TABLET ORAL at 10:11

## 2022-11-21 RX ADMIN — GABAPENTIN 100 MG: 100 CAPSULE ORAL at 10:11

## 2022-11-21 RX ADMIN — ISOSORBIDE MONONITRATE 120 MG: 30 TABLET, EXTENDED RELEASE ORAL at 10:11

## 2022-11-21 RX ADMIN — CARVEDILOL 25 MG: 25 TABLET, FILM COATED ORAL at 10:11

## 2022-11-21 RX ADMIN — HYDRALAZINE HYDROCHLORIDE 100 MG: 25 TABLET ORAL at 05:11

## 2022-11-21 RX ADMIN — METOCLOPRAMIDE HYDROCHLORIDE 5 MG: 5 TABLET ORAL at 10:11

## 2022-11-21 RX ADMIN — LEVOFLOXACIN 250 MG: 250 INJECTION, SOLUTION INTRAVENOUS at 08:11

## 2022-11-21 RX ADMIN — INSULIN ASPART 5 UNITS: 100 INJECTION, SOLUTION INTRAVENOUS; SUBCUTANEOUS at 12:11

## 2022-11-21 RX ADMIN — LEVETIRACETAM 500 MG: 500 TABLET, FILM COATED ORAL at 08:11

## 2022-11-21 RX ADMIN — INSULIN ASPART 3 UNITS: 100 INJECTION, SOLUTION INTRAVENOUS; SUBCUTANEOUS at 06:11

## 2022-11-21 RX ADMIN — CARVEDILOL 25 MG: 25 TABLET, FILM COATED ORAL at 08:11

## 2022-11-21 RX ADMIN — GABAPENTIN 100 MG: 100 CAPSULE ORAL at 08:11

## 2022-11-21 RX ADMIN — METOCLOPRAMIDE HYDROCHLORIDE 5 MG: 5 TABLET ORAL at 06:11

## 2022-11-21 RX ADMIN — HYDRALAZINE HYDROCHLORIDE 100 MG: 25 TABLET ORAL at 09:11

## 2022-11-21 RX ADMIN — INSULIN ASPART 1 UNITS: 100 INJECTION, SOLUTION INTRAVENOUS; SUBCUTANEOUS at 08:11

## 2022-11-21 RX ADMIN — METOCLOPRAMIDE HYDROCHLORIDE 5 MG: 5 TABLET ORAL at 05:11

## 2022-11-21 RX ADMIN — FLUOXETINE 20 MG: 20 CAPSULE ORAL at 10:11

## 2022-11-21 RX ADMIN — APIXABAN 2.5 MG: 2.5 TABLET, FILM COATED ORAL at 08:11

## 2022-11-21 RX ADMIN — APIXABAN 2.5 MG: 2.5 TABLET, FILM COATED ORAL at 10:11

## 2022-11-21 NOTE — CONSULTS
Date: November 21, 2022      Consult request received appreciated.      The patient's medical record and pertinent radiologic imaging studies were reviewed.      The Thoracic Surgery Service is consulted for consideration of performance of a pericardial window.  The patient is very emotionally labile at this time, crying and begging for pain medication.  I am not sure if she will benefit from a pericardial window.  I will reassess the patient tomorrow, hopefully she will have a more stable mental status that time.

## 2022-11-21 NOTE — PROGRESS NOTES
[facility] Medicine  Progress Note    Patient Name: Tabby Howard  MRN: [id number]  Patient Class: OP- Observation   Admission Date: 11/19/2022  Length of Stay: 0 days  Attending Physician: Armani Gurrola MD  Primary Care Provider: [facility]        Subjective:     Principal Problem:Gastroparesis    Interval History:     Patient was seen and examined  Entered into the patient room and patient was asleep.  I waked her up and suddenly having pain in the abdomen  CT scan findings noted and started the IV antibiotics for possible pyelonephritis    11/21  Complaint of abdomen pain , no tachycardia  No CP . Labs stable . Cardio input appreciated . Patient had lap freedom in the past       Review of Systems  Objective:     Vital Signs (Most Recent):  Temp: 97.6 °F (36.4 °C) (11/21/22 1203)  Pulse: 84 (11/21/22 1203)  Resp: 20 (11/21/22 1203)  BP: (!) 154/98 (11/21/22 1203)  SpO2: 95 % (11/21/22 1203)   Vital Signs (24h Range):  Temp:  [97.6 °F (36.4 °C)-98.6 °F (37 °C)] 97.6 °F (36.4 °C)  Pulse:  [76-87] 84  Resp:  [16-20] 20  SpO2:  [94 %-99 %] 95 %  BP: (115-154)/(65-98) 154/98     Weight: 56 kg (123 lb 7.3 oz)  Body mass index is 22.58 kg/m².    Intake/Output Summary (Last 24 hours) at 11/21/2022 1303  Last data filed at 11/21/2022 0549  Gross per 24 hour   Intake 240 ml   Output --   Net 240 ml        Physical Exam    General: Patient resting comfortably in no acute distress. Appears as stated age. Calm  Eyes: EOM intact. No conjunctivae injection. No scleral icterus.  ENT: Hearing grossly intact. No discharge from ears. No nasal discharge.   CVS: RRR. No LE edema BL.  Lungs: decreased breath sounds. No accessory muscle use. No acute respiratory distress  Neuro: non focal , Follows commands. Responds appropriately       Significant Labs: All pertinent labs within the past 24 hours have been reviewed.  BMP:   Recent Labs   Lab 11/21/22  0558   *   *    K 4.2   CL 94*   CO2 25   BUN 38*   CREATININE 4.4*   CALCIUM 8.1*       CBC:   Recent Labs   Lab 11/20/22  0210 11/21/22  0558   WBC 4.42 4.76   HGB 9.5* 8.5*   HCT 27.2* 25.1*   * 105*       CMP:   Recent Labs   Lab 11/20/22  0210 11/21/22  0558   * 129*   K 4.1 4.2   CL 99 94*   CO2 26 25   * 357*   BUN 22* 38*   CREATININE 2.9* 4.4*   CALCIUM 8.3* 8.1*   PROT 6.2 6.1   ALBUMIN 2.8* 2.8*   BILITOT 1.6* 1.3*   ALKPHOS 235* 212*   AST 38 27   ALT 49* 39   ANIONGAP 8 10         Significant Imaging: I have reviewed all pertinent imaging results/findings within the past 24 hours.    Assessment/Plan:      Active Diagnoses:    Diagnosis Date Noted POA    PRINCIPAL PROBLEM:  Gastroparesis - suspected, unconfirmed [K31.84] 04/12/2022 Yes     Chronic      Problems Resolved During this Admission:        ASSESSMENT AND PLAN      Intractable Nausea with vomiting Generalized abdominal pain   Chronic condition /possible pyelonephritis in CT   CT abdomen and pelvis without contrast results reviewed, no acute intra-abdominal process noted. Diffused edema s/t volume overload  Symptoms are likely related to underlying gastroparesis  and possible pyelonephritis   IV  levaquin   Urine culture need to collect         2. ESRD (end stage renal disease)  hemodialysis as per nephro       3. Gastroparesis  Reglan IV   PPI     4. H/O Deep vein thrombosis (DVT) of non-extremity vein  Continue Eliquis       5. Seizure  Continue Keppra   seizure precautions.        6. Diabetes mellitus due to underlying condition with unspecified                 complications  Low dose NovoLog insulin sliding scale  Hypoglycemic protocol  Diabetic cardiac renal diet once eating     7. Chronic Moderate Pericardial  Effusion      cardiology consulted . Suspicious for cardiac amyloidosis in the past            8. HTN (hypertension)  Continue use of home medications to manage  Use intravenous hydralazine 10 mg IV q.6 hours p.r.n. for systolic  blood pressure above 160 mmHg.  Possible DC soon      VTE Risk Mitigation (From admission, onward)           Ordered     apixaban tablet 2.5 mg  2 times daily         11/21/22 0711     Reason for No Pharmacological VTE Prophylaxis  Once        Question:  Reasons:  Answer:  Already adequately anticoagulated on oral Anticoagulants    11/19/22 1821     IP VTE HIGH RISK PATIENT  Once         11/19/22 1821                       Armani Gurrola MD  Department of Hospital Medicine   [facility]

## 2022-11-21 NOTE — PLAN OF CARE
[facility]  Initial Discharge Assessment       Primary Care Provider: [facility]    Admission Diagnosis: Abdominal pain [R10.9]    Admission Date: 11/19/2022  Expected Discharge Date: 11/22/2022    Social work intern met with Pt at bedside to complete discharge assessment. Pt requested social work intern speak with her father to complete assessment. Social work intern called Pt's father Garcia Howard ([phone]) to complete discharge assessment. Demographics, PCP, and insurance verified. No home health. Pt is receiving Dialysis through [facility] but pt's father unable to verify location. Pt's father reports ability to complete ADLs with the assistance of  rolling walker.  Pt's father requested 3-in1 commode as shower chair. Pt's father verbalized plan to discharge home via family transport. Pt has no other needs to be addressed at this time.    Discharge Barriers Identified: None    Payor: MEDICAID / Plan: [place] / Product Type: Managed Medicaid /     Extended Emergency Contact Information  Primary Emergency Contact: Garcia oHward  Mobile Phone: [phone]  Relation: Father  Preferred language: English   needed? No  Secondary Emergency Contact: Tanya Howard  Mobile Phone: [phone]  Relation: Step parent  Preferred language: English   needed? No    Discharge Plan A: Home with family  Discharge Plan B: Home with family      [facility]  [address]  [facility] [address]  Phone: [phone] Fax: [phone]      Initial Assessment (most recent)       Adult Discharge Assessment - 11/21/22 1045          Discharge Assessment    Assessment Type Discharge Planning Assessment     Confirmed/corrected address, phone number and insurance Yes     Confirmed Demographics Correct on Facesheet     Source of Information family     Does patient/caregiver understand observation status Yes     Communicated TATIANA  with patient/caregiver Yes     Reason For Admission Gastroparesis - suspected, unconfirmed     Lives With parent(s)     Facility Arrived From: [address] [place]     Do you expect to return to your current living situation? Yes     Do you have help at home or someone to help you manage your care at home? Yes     Who are your caregiver(s) and their phone number(s)? Garcia Howard (Father)   [phone] (Mobile     Prior to hospitilization cognitive status: Unable to Assess     Current cognitive status: Unable to Assess     Walking or Climbing Stairs Difficulty ambulation difficulty, requires equipment     Mobility Management Pt uses rolling walker for ambulation     Dressing/Bathing Difficulty none     Home Accessibility wheelchair accessible     Home Layout Able to live on 1st floor     Equipment Currently Used at Home walker, rolling     Readmission within 30 days? Yes     Patient currently being followed by outpatient case management? No     Do you currently have service(s) that help you manage your care at home? No     Do you take prescription medications? Yes     Do you have prescription coverage? Yes     Coverage Payor:  MEDICAID - [employer] [facility]     Do you have any problems affording any of your prescribed medications? No     Is the patient taking medications as prescribed? yes     Who is going to help you get home at discharge? Garcia Howard (Father)   [phone]     How do you get to doctors appointments? family or friend will provide     Are you on dialysis? Yes     Dialysis Name and Scheduled days MWF [facility] (unsure of location)     Do you take coumadin? No     Discharge Plan A Home with family     Discharge Plan B Home with family     DME Needed Upon Discharge  3-in-1 commode     Discharge Plan discussed with: Parent(s)     Name(s) and Number(s) Garcia Howard (Father)   [phone] (Mobile     Discharge Barriers Identified None

## 2022-11-21 NOTE — PROGRESS NOTES
INPATIENT NEPHROLOGY CONSULT   [facility]    Patient Name: Tabby Howard  Date: 11/21/2022    Reason for consultation: ESRD    Chief Complaint:   Chief Complaint   Patient presents with    Abdominal Pain    Chest Pain    Back Pain     Since 3 am, Missed HD on Thursday and today       History of Present Illness:  31 y/o AAF with a history of ESRD on HD TTS, DMII, gastroparesis, HTN, CHF, and sickle cell trait who p/w severe abdominal pain since this AM, 10/10, all over her abdomen, no exac/reliev factors, missed 2 HD treatments, with history of noncompliance with dialysis, consulted for dialysis.     Interval History:  11/19- seen in ER, severe abd pain all over but not worse with palpation/abd soft/nondistended, spike in BP, no edema on exam  11/20- got off 3L with HD, u/s abd negative, severe abd pain  11.21  c/o nausea.  AFVSS    CT AP  1. Abnormal left kidney with evidence of fairly prominent perinephric stranding inflammatory response change attributed towards ongoing pyelonephritis.  2. Persistent bladder distention with evidence of prominent circumferential wall thickening attributed towards underlying cystitis..  3.. Diffuse body wall anasarca.  4. Large pericardial effusion stable.    Plan of Care:    Assessment:  Abdominal pain- suspect UTI/pyelo, bowel edema, and gastroparesis  ESRD on HD TTS with noncompliance  HTN/CHF- pericardial effusion  Hyponatremia  Hypokalemia  SHPT  Anemia of CKD    Plan:    - would treat for UTI/pyelo- continue to push UF with HD (her non compliance with dialysis with missed treatments is a contributing factor)- continue gastroparesis management-    - continue HD TTS schedule  - continue home BP meds  - continue 2-3L UF- f/u echo regarding pericardial effusion- noncompliance with dialysis is likely the culprit for this effusion- work on optimizing nutrition for fluid third spacing  - continue renal diet with 1.5L fluid restriction  - adjust dialysate  - phos  at goal for esrd patient  - start SHELLEY with HD    Thank you for allowing us to participate in this patient's care. We will continue to follow.    Vital Signs:  Temp Readings from Last 3 Encounters:   11/21/22 97.7 °F (36.5 °C) (Oral)   11/15/22 98 °F (36.7 °C)   11/07/22 98.6 °F (37 °C) (Oral)       Pulse Readings from Last 3 Encounters:   11/21/22 87   11/15/22 92   11/07/22 87       BP Readings from Last 3 Encounters:   11/21/22 (!) 154/98   11/15/22 (!) 148/77   11/07/22 (!) 143/97       Weight:  Wt Readings from Last 3 Encounters:   11/19/22 56 kg (123 lb 7.3 oz)   11/20/22 56 kg (123 lb 7.3 oz)   11/12/22 57.1 kg (125 lb 14.1 oz)         Medications:  Scheduled Meds:   amLODIPine  10 mg Oral Daily    apixaban  2.5 mg Oral BID    carvediloL  25 mg Oral BID    cloNIDine 0.2 mg/24 hr td ptwk  1 patch Transdermal Q7 Days    [START ON 11/22/2022] epoetin teresa-ebpx (RETACRIT) injection  50 Units/kg Intravenous Every Tues, Thurs, Sat    FLUoxetine  20 mg Oral Daily    gabapentin  100 mg Oral BID    hydrALAZINE  100 mg Oral Q8H    insulin detemir U-100  5 Units Subcutaneous Daily    isosorbide mononitrate  120 mg Oral Daily    levETIRAcetam  500 mg Oral BID    levoFLOXacin  250 mg Intravenous Q24H    metoclopramide HCl  5 mg Oral TID AC     Continuous Infusions:  PRN Meds:.  No current facility-administered medications on file prior to encounter.     Current Outpatient Medications on File Prior to Encounter   Medication Sig Dispense Refill    albuterol (PROVENTIL/VENTOLIN HFA) 90 mcg/actuation inhaler Inhale 2 puffs into the lungs every 6 (six) hours as needed for Wheezing. Rescue 18 g 3    amLODIPine (NORVASC) 10 MG tablet Take 1 tablet (10 mg total) by mouth once daily. 30 tablet 11    apixaban (ELIQUIS) 5 mg Tab Take 1 tablet (5 mg total) by mouth 2 (two) times daily. 60 tablet 2    carvediloL (COREG) 25 MG tablet Take 1 tablet (25 mg total) by mouth 2 (two) times daily. 60 tablet 2    cloNIDine 0.2 mg/24 hr td ptwk  "(CATAPRES) 0.2 mg/24 hr Place 1 patch onto the skin every 7 days. 4 patch 2    FLUoxetine 20 MG capsule Take 1 capsule (20 mg total) by mouth once daily. 30 capsule 11    gabapentin (NEURONTIN) 100 MG capsule Take 1 capsule (100 mg total) by mouth 2 (two) times daily. 90 capsule 2    hydrALAZINE (APRESOLINE) 100 MG tablet Take 1 tablet (100 mg total) by mouth every 8 (eight) hours. 90 tablet 2    insulin aspart U-100 (NOVOLOG) 100 unit/mL (3 mL) InPn pen Inject 1-10 Units into the skin before meals and at bedtime as needed (Hyperglycemia). Max daily dose 40 units. 3 mL 6    insulin detemir U-100 (LEVEMIR FLEXTOUCH) 100 unit/mL (3 mL) SubQ InPn pen Inject 5 Units into the skin once daily. Discard pen after 42 days. 6 mL 2    isosorbide mononitrate (IMDUR) 60 MG 24 hr tablet Take 2 tablets (120 mg total) by mouth once daily. 30 tablet 11    levETIRAcetam (KEPPRA) 500 MG Tab Take 1 tablet (500 mg total) by mouth 2 (two) times daily. 60 tablet 11    methocarbamoL (ROBAXIN) 500 MG Tab Take 1 tablet (500 mg total) by mouth 3 (three) times daily. 90 tablet 1    ondansetron (ZOFRAN) 4 MG tablet Take 1 tablet (4 mg total) by mouth every 8 (eight) hours as needed for Nausea. 60 tablet 2    pantoprazole (PROTONIX) 40 MG tablet Take 40 mg by mouth once daily.      pen needle, diabetic (BD ULTRA-FINE MAGALIE PEN NEEDLE) 32 gauge x 5/32" Ndle Inject into the skin 5 times per day with insulin 100 each 12    [DISCONTINUED] atenoloL (TENORMIN) 50 MG tablet Take 1 tablet (50 mg total) by mouth every other day. 45 tablet 3    [DISCONTINUED] omeprazole (PRILOSEC) 20 MG capsule Take 2 capsules (40 mg total) by mouth once daily. for 10 days 20 capsule 0    [DISCONTINUED] torsemide (DEMADEX) 20 MG Tab Take 1 tablet (20 mg total) by mouth 2 (two) times a day. (Patient taking differently: Take 20 mg by mouth once daily.) 60 tablet 1       Review of Systems:  Neg    Physical Exam:  Constitutional: tearful, aao x 3  Heart: rrr, no m/r/g, wwp, no " edema  Lungs: ctab, no w/r/r/c, no lb  Abdomen: s/nt/nd, +BS    Results:  Lab Results   Component Value Date     (L) 11/21/2022    K 4.2 11/21/2022    CL 94 (L) 11/21/2022    CO2 25 11/21/2022    BUN 38 (H) 11/21/2022    CREATININE 4.4 (H) 11/21/2022    CALCIUM 8.1 (L) 11/21/2022    ANIONGAP 10 11/21/2022    ESTGFRAFRICA 20 (A) 07/31/2022    EGFRNONAA 18 (A) 07/31/2022       Lab Results   Component Value Date    CALCIUM 8.1 (L) 11/21/2022    PHOS 2.9 11/20/2022       Recent Labs   Lab 11/21/22  0558   WBC 4.76   RBC 3.04*   HGB 8.5*   HCT 25.1*   *   MCV 83   MCH 28.0   MCHC 33.9         I have personally reviewed pertinent radiological imaging and reports.    I have spent > 35 minutes providing care for this patient for the above diagnoses. These services have included chart/data/imaging review, evaluation, exam, formulation of plan, , note preparation, and discussions with staff involved in this patient's care.    Hugh Figueroa MD  Nephrology  [facility] [place]  [phone]  )

## 2022-11-21 NOTE — CONSULTS
[facility]  Cardiology  Consult Note    Patient Name: Tabby Howard  MRN: [id number]  Admission Date: 2022  Hospital Length of Stay: 0 days  Code Status: Full Code   Attending Provider: Armani Gurrola MD   Consulting Provider: Osbaldo Hart MD  Primary Care Physician: [facility]  Principal Problem:Gastroparesis    Patient information was obtained from patient, past medical records, and ER records.     Consults  Subjective:     Chief Complaint:   patient is admitted for continued abdominal pain again     HPI:  she is had multiple workups were abdominal pain and is on pain medication   Problem 2.  She is had cardiomegaly and pericardial effusions  with effusions noted as far back as July   This been no evidence of tamponade by physical examination or on echo   Recent chest x-ray demonstrates water bottle configuration to the heart   CT scan of the abdomen shows 4 cm pericardial effusion although this is not seen on the echo were CT scan of the chest where the size is approximally 1/2 cm   EKG showed no acute ST-T changes or small voltage   Patient's alkaline phosphatase has risen on recent admits without obvious etiology    Past Medical History:   Diagnosis Date    CKD (chronic kidney disease), stage IV 2022    Diabetes mellitus due to underlying condition with unspecified complications 2022    Gastroparesis 2022    Heart failure with preserved ejection fraction 2022    EF 55% on 3/22    History of gastroesophageal reflux (GERD)     History of supraventricular tachycardia     Hyperkalemia 2022    Hypertensive emergency 2022    Sickle cell trait 2022    Type 2 diabetes mellitus        Past Surgical History:   Procedure Laterality Date     SECTION      x 3    COLONOSCOPY      COLONOSCOPY N/A 2022    Procedure: COLONOSCOPY;  Surgeon: Jagdeep Cedeno MD;  Location: [facility];  Service: Endoscopy;  Laterality: N/A;     ESOPHAGOGASTRODUODENOSCOPY N/A 10/18/2019    Procedure: ESOPHAGOGASTRODUODENOSCOPY (EGD);  Surgeon: Gianluca Mednez MD;  Location: [facility] ENDO;  Service: Endoscopy;  Laterality: N/A;    ESOPHAGOGASTRODUODENOSCOPY N/A 08/24/2022    Procedure: EGD (ESOPHAGOGASTRODUODENOSCOPY);  Surgeon: Micky Paredes III, MD;  Location: [facility] ENDO;  Service: Endoscopy;  Laterality: N/A;    LAPAROSCOPIC CHOLECYSTECTOMY N/A 07/30/2022    Procedure: CHOLECYSTECTOMY, LAPAROSCOPIC;  Surgeon: Grey Perez MD;  Location: [facility] OR;  Service: General;  Laterality: N/A;    PLACEMENT OF DUAL-LUMEN VASCULAR CATHETER Left 07/12/2022    Procedure: INSERTION-CATHETER-JOSEPH;  Surgeon: Dionte Gan MD;  Location: [facility] OR;  Service: General;  Laterality: Left;    PLACEMENT OF DUAL-LUMEN VASCULAR CATHETER Right 07/26/2022    Procedure: INSERTION-CATHETER-Hemosplit;  Surgeon: Dionte Gan MD;  Location: [facility] OR;  Service: General;  Laterality: Right;       Review of patient's allergies indicates:   Allergen Reactions    Penicillins Hives       No current facility-administered medications on file prior to encounter.     Current Outpatient Medications on File Prior to Encounter   Medication Sig    albuterol (PROVENTIL/VENTOLIN HFA) 90 mcg/actuation inhaler Inhale 2 puffs into the lungs every 6 (six) hours as needed for Wheezing. Rescue    amLODIPine (NORVASC) 10 MG tablet Take 1 tablet (10 mg total) by mouth once daily.    apixaban (ELIQUIS) 5 mg Tab Take 1 tablet (5 mg total) by mouth 2 (two) times daily.    carvediloL (COREG) 25 MG tablet Take 1 tablet (25 mg total) by mouth 2 (two) times daily.    cloNIDine 0.2 mg/24 hr td ptwk (CATAPRES) 0.2 mg/24 hr Place 1 patch onto the skin every 7 days.    FLUoxetine 20 MG capsule Take 1 capsule (20 mg total) by mouth once daily.    gabapentin (NEURONTIN) 100 MG capsule Take 1 capsule (100 mg total) by mouth 2 (two) times daily.    hydrALAZINE (APRESOLINE) 100 MG tablet Take 1 tablet (100 mg total)  "by mouth every 8 (eight) hours.    insulin aspart U-100 (NOVOLOG) 100 unit/mL (3 mL) InPn pen Inject 1-10 Units into the skin before meals and at bedtime as needed (Hyperglycemia). Max daily dose 40 units.    insulin detemir U-100 (LEVEMIR FLEXTOUCH) 100 unit/mL (3 mL) SubQ InPn pen Inject 5 Units into the skin once daily. Discard pen after 42 days.    isosorbide mononitrate (IMDUR) 60 MG 24 hr tablet Take 2 tablets (120 mg total) by mouth once daily.    levETIRAcetam (KEPPRA) 500 MG Tab Take 1 tablet (500 mg total) by mouth 2 (two) times daily.    methocarbamoL (ROBAXIN) 500 MG Tab Take 1 tablet (500 mg total) by mouth 3 (three) times daily.    ondansetron (ZOFRAN) 4 MG tablet Take 1 tablet (4 mg total) by mouth every 8 (eight) hours as needed for Nausea.    pantoprazole (PROTONIX) 40 MG tablet Take 40 mg by mouth once daily.    pen needle, diabetic (BD ULTRA-FINE MAGALEI PEN NEEDLE) 32 gauge x 5/32" Ndle Inject into the skin 5 times per day with insulin    [DISCONTINUED] atenoloL (TENORMIN) 50 MG tablet Take 1 tablet (50 mg total) by mouth every other day.    [DISCONTINUED] omeprazole (PRILOSEC) 20 MG capsule Take 2 capsules (40 mg total) by mouth once daily. for 10 days    [DISCONTINUED] torsemide (DEMADEX) 20 MG Tab Take 1 tablet (20 mg total) by mouth 2 (two) times a day. (Patient taking differently: Take 20 mg by mouth once daily.)     Family History       Problem Relation (Age of Onset)    Diabetes Mother, Father          Tobacco Use    Smoking status: Never    Smokeless tobacco: Never   Substance and Sexual Activity    Alcohol use: Not Currently    Drug use: No    Sexual activity: Not Currently     Partners: Male     Birth control/protection: I.U.D.     ROS patient is complaining about abdominal pain and is in fetal position in the bed complaining about abdominal pain    Objective:     Vital Signs (Most Recent):  Temp: 98.6 °F (37 °C) (11/20/22 1642)  Pulse: 76 (11/20/22 1642)  Resp: 18 (11/20/22 1642)  BP: " 115/77 (11/20/22 1642)  SpO2: 99 % (11/20/22 1642) Vital Signs (24h Range):  Temp:  [97.5 °F (36.4 °C)-98.6 °F (37 °C)] 98.6 °F (37 °C)  Pulse:  [76-96] 76  Resp:  [16-20] 18  SpO2:  [98 %-100 %] 99 %  BP: (115-170)/() 115/77     Weight: 56 kg (123 lb 7.3 oz)  Body mass index is 22.58 kg/m².    SpO2: 99 %  O2 Device (Oxygen Therapy): nasal cannula      Intake/Output Summary (Last 24 hours) at 11/20/2022 1817  Last data filed at 11/19/2022 1925  Gross per 24 hour   Intake 250 ml   Output 3500 ml   Net -3250 ml       Lines/Drains/Airways       Central Venous Catheter Line  Duration                  Hemodialysis Catheter 07/26/22 1457 right internal jugular 117 days              Peripheral Intravenous Line  Duration                  Peripheral IV - Single Lumen 11/19/22 1200 20 G Posterior;Right Forearm 1 day                    Physical Exam pressure from 115/70   Lungs few crackles   Cardiac sounds slightly muffled   Abdominal guarding   Edema is present    Significant Labs: CMP   Recent Labs   Lab 11/19/22  1154 11/20/22  0210   * 133*   K 3.4* 4.1   CL 95 99   CO2 26 26   * 179*   BUN 40* 22*   CREATININE 4.3* 2.9*   CALCIUM 8.9 8.3*   PROT 7.0 6.2   ALBUMIN 3.3* 2.8*   BILITOT 2.0* 1.6*   ALKPHOS 288* 235*   AST 66* 38   ALT 68* 49*   ANIONGAP 11 8   , CBC   Recent Labs   Lab 11/19/22  1154 11/20/22  0210   WBC 6.18 4.42   HGB 9.6* 9.5*   HCT 27.2* 27.2*   * 111*   , and Troponin No results for input(s): TROPONINI in the last 48 hours.   Cta abd 11/19   Significant Imaging: Lower Chest: Stable large pericardial effusion encircles the pericardial sac which showed evidence of maximum thickness on the right posterior lateral edge measuring upwards of 4.1 cm in size. Regional areas of groundglass opacity changes and reticular opacities within the lower lobes have diminished substantially with minimal scarring in the left lower lobe projecting over the diaphragmatic surface.  Assessment and  Plan:    Dialysis patient with chronic abdominal pain 7/22 gb study   Cholecystitis.  Visualization of the gallbladder at 4 hours would suggest chronic cholecystitis whereas nonvisualization of the gallbladder at 4 hours would be consistent with acute cholecystitis.   It is possible some of the abdominal pain may be related to her pericardial effusion and hepatic congestion.  No evidence of tamponade or constriction physically but pericardial effusion appears to be larger on CT of the abdomen than on the echo   If no other etiology found I gallbladder than consider pericardial window for diagnostic and therapeutic purposes     Active Diagnoses:    Diagnosis Date Noted POA    PRINCIPAL PROBLEM:  Gastroparesis - suspected, unconfirmed [K31.84] 04/12/2022 Yes     Chronic      Problems Resolved During this Admission:   I personally reviewed old and new ecg's, lab reports,, xray reports  and  other cardiovascular studies including  echo's, stress tests, angiogram reports, holters,and vascular studies .  In addition I evaluated original cardiac cath  ___echo  ____cxr ______ct ____scan on [employer] view or [employer] or other viewing platforms .  I reviewed  office and hospital notes Yes _x ___ of  referring providers.      VTE Risk Mitigation (From admission, onward)           Ordered     apixaban tablet 5 mg  2 times daily         11/19/22 1821     Reason for No Pharmacological VTE Prophylaxis  Once        Question:  Reasons:  Answer:  Already adequately anticoagulated on oral Anticoagulants    11/19/22 1821     IP VTE HIGH RISK PATIENT  Once         11/19/22 1821                    Thank you for your consult.     Osbaldo Hart MD  Cardiology   [facility]

## 2022-11-22 LAB
ALBUMIN SERPL BCP-MCNC: 2.7 G/DL (ref 3.5–5.2)
ALP SERPL-CCNC: 212 U/L (ref 55–135)
ALT SERPL W/O P-5'-P-CCNC: 39 U/L (ref 10–44)
ANION GAP SERPL CALC-SCNC: 8 MMOL/L (ref 8–16)
AST SERPL-CCNC: 29 U/L (ref 10–40)
BACTERIA UR CULT: NO GROWTH
BASOPHILS # BLD AUTO: 0.02 K/UL (ref 0–0.2)
BASOPHILS NFR BLD: 0.4 % (ref 0–1.9)
BILIRUB SERPL-MCNC: 1.2 MG/DL (ref 0.1–1)
BUN SERPL-MCNC: 51 MG/DL (ref 6–20)
CALCIUM SERPL-MCNC: 8.1 MG/DL (ref 8.7–10.5)
CHLORIDE SERPL-SCNC: 95 MMOL/L (ref 95–110)
CO2 SERPL-SCNC: 25 MMOL/L (ref 23–29)
CREAT SERPL-MCNC: 5.1 MG/DL (ref 0.5–1.4)
DIFFERENTIAL METHOD: ABNORMAL
EOSINOPHIL # BLD AUTO: 0.2 K/UL (ref 0–0.5)
EOSINOPHIL NFR BLD: 3.3 % (ref 0–8)
ERYTHROCYTE [DISTWIDTH] IN BLOOD BY AUTOMATED COUNT: 14 % (ref 11.5–14.5)
EST. GFR  (NO RACE VARIABLE): 10.8 ML/MIN/1.73 M^2
GLUCOSE SERPL-MCNC: 253 MG/DL (ref 70–110)
GLUCOSE SERPL-MCNC: 280 MG/DL (ref 70–110)
GLUCOSE SERPL-MCNC: 321 MG/DL (ref 70–110)
GLUCOSE SERPL-MCNC: 322 MG/DL (ref 70–110)
GLUCOSE SERPL-MCNC: 373 MG/DL (ref 70–110)
HCT VFR BLD AUTO: 23 % (ref 37–48.5)
HGB BLD-MCNC: 7.9 G/DL (ref 12–16)
IMM GRANULOCYTES # BLD AUTO: 0.01 K/UL (ref 0–0.04)
IMM GRANULOCYTES NFR BLD AUTO: 0.2 % (ref 0–0.5)
LYMPHOCYTES # BLD AUTO: 0.8 K/UL (ref 1–4.8)
LYMPHOCYTES NFR BLD: 18.3 % (ref 18–48)
MCH RBC QN AUTO: 28.4 PG (ref 27–31)
MCHC RBC AUTO-ENTMCNC: 34.3 G/DL (ref 32–36)
MCV RBC AUTO: 83 FL (ref 82–98)
MONOCYTES # BLD AUTO: 0.5 K/UL (ref 0.3–1)
MONOCYTES NFR BLD: 10 % (ref 4–15)
NEUTROPHILS # BLD AUTO: 3.1 K/UL (ref 1.8–7.7)
NEUTROPHILS NFR BLD: 67.8 % (ref 38–73)
NRBC BLD-RTO: 0 /100 WBC
PLATELET # BLD AUTO: 104 K/UL (ref 150–450)
PMV BLD AUTO: 10.1 FL (ref 9.2–12.9)
POTASSIUM SERPL-SCNC: 5.2 MMOL/L (ref 3.5–5.1)
PROT SERPL-MCNC: 5.8 G/DL (ref 6–8.4)
RBC # BLD AUTO: 2.78 M/UL (ref 4–5.4)
SODIUM SERPL-SCNC: 128 MMOL/L (ref 136–145)
WBC # BLD AUTO: 4.6 K/UL (ref 3.9–12.7)

## 2022-11-22 PROCEDURE — 25000003 PHARM REV CODE 250: Performed by: INTERNAL MEDICINE

## 2022-11-22 PROCEDURE — 85025 COMPLETE CBC W/AUTO DIFF WBC: CPT | Performed by: NURSE PRACTITIONER

## 2022-11-22 PROCEDURE — 63600175 PHARM REV CODE 636 W HCPCS: Mod: JG | Performed by: INTERNAL MEDICINE

## 2022-11-22 PROCEDURE — 12000002 HC ACUTE/MED SURGE SEMI-PRIVATE ROOM

## 2022-11-22 PROCEDURE — 80053 COMPREHEN METABOLIC PANEL: CPT | Performed by: NURSE PRACTITIONER

## 2022-11-22 PROCEDURE — 90935 HEMODIALYSIS ONE EVALUATION: CPT

## 2022-11-22 PROCEDURE — 82962 GLUCOSE BLOOD TEST: CPT

## 2022-11-22 PROCEDURE — 99233 SBSQ HOSP IP/OBS HIGH 50: CPT | Mod: ,,, | Performed by: SPECIALIST

## 2022-11-22 PROCEDURE — 36415 COLL VENOUS BLD VENIPUNCTURE: CPT | Performed by: NURSE PRACTITIONER

## 2022-11-22 PROCEDURE — 99233 PR SUBSEQUENT HOSPITAL CARE,LEVL III: ICD-10-PCS | Mod: ,,, | Performed by: SPECIALIST

## 2022-11-22 PROCEDURE — 25000003 PHARM REV CODE 250: Performed by: NURSE PRACTITIONER

## 2022-11-22 RX ORDER — LEVOFLOXACIN 5 MG/ML
250 INJECTION, SOLUTION INTRAVENOUS
Status: DISCONTINUED | OUTPATIENT
Start: 2022-11-23 | End: 2022-11-23 | Stop reason: HOSPADM

## 2022-11-22 RX ORDER — HYDROCODONE BITARTRATE AND ACETAMINOPHEN 7.5; 325 MG/1; MG/1
1 TABLET ORAL EVERY 4 HOURS PRN
Status: DISCONTINUED | OUTPATIENT
Start: 2022-11-22 | End: 2022-11-23 | Stop reason: HOSPADM

## 2022-11-22 RX ORDER — SODIUM CHLORIDE 9 MG/ML
INJECTION, SOLUTION INTRAVENOUS ONCE
Status: CANCELLED | OUTPATIENT
Start: 2022-11-22 | End: 2022-11-22

## 2022-11-22 RX ORDER — MUPIROCIN 20 MG/G
OINTMENT TOPICAL 2 TIMES DAILY
Status: DISCONTINUED | OUTPATIENT
Start: 2022-11-22 | End: 2022-11-23 | Stop reason: HOSPADM

## 2022-11-22 RX ADMIN — FLUOXETINE 20 MG: 20 CAPSULE ORAL at 12:11

## 2022-11-22 RX ADMIN — GABAPENTIN 100 MG: 100 CAPSULE ORAL at 12:11

## 2022-11-22 RX ADMIN — HYDROCODONE BITARTRATE AND ACETAMINOPHEN 1 TABLET: 5; 325 TABLET ORAL at 06:11

## 2022-11-22 RX ADMIN — AMLODIPINE BESYLATE 10 MG: 5 TABLET ORAL at 12:11

## 2022-11-22 RX ADMIN — METOCLOPRAMIDE HYDROCHLORIDE 5 MG: 5 TABLET ORAL at 12:11

## 2022-11-22 RX ADMIN — INSULIN ASPART 3 UNITS: 100 INJECTION, SOLUTION INTRAVENOUS; SUBCUTANEOUS at 09:11

## 2022-11-22 RX ADMIN — HYDRALAZINE HYDROCHLORIDE 100 MG: 25 TABLET ORAL at 09:11

## 2022-11-22 RX ADMIN — METOCLOPRAMIDE HYDROCHLORIDE 5 MG: 5 TABLET ORAL at 06:11

## 2022-11-22 RX ADMIN — GABAPENTIN 100 MG: 100 CAPSULE ORAL at 09:11

## 2022-11-22 RX ADMIN — HYDRALAZINE HYDROCHLORIDE 100 MG: 25 TABLET ORAL at 05:11

## 2022-11-22 RX ADMIN — HYDROCODONE BITARTRATE AND ACETAMINOPHEN 1 TABLET: 5; 325 TABLET ORAL at 12:11

## 2022-11-22 RX ADMIN — HYDROCODONE BITARTRATE AND ACETAMINOPHEN 1 TABLET: 7.5; 325 TABLET ORAL at 09:11

## 2022-11-22 RX ADMIN — METOCLOPRAMIDE HYDROCHLORIDE 5 MG: 5 TABLET ORAL at 05:11

## 2022-11-22 RX ADMIN — HYDRALAZINE HYDROCHLORIDE 100 MG: 25 TABLET ORAL at 06:11

## 2022-11-22 RX ADMIN — EPOETIN ALFA-EPBX 2800 UNITS: 10000 INJECTION, SOLUTION INTRAVENOUS; SUBCUTANEOUS at 11:11

## 2022-11-22 RX ADMIN — APIXABAN 2.5 MG: 2.5 TABLET, FILM COATED ORAL at 09:11

## 2022-11-22 RX ADMIN — Medication 6 MG: at 09:11

## 2022-11-22 RX ADMIN — HYDROCODONE BITARTRATE AND ACETAMINOPHEN 1 TABLET: 5; 325 TABLET ORAL at 02:11

## 2022-11-22 RX ADMIN — LEVETIRACETAM 500 MG: 500 TABLET, FILM COATED ORAL at 09:11

## 2022-11-22 RX ADMIN — CARVEDILOL 25 MG: 25 TABLET, FILM COATED ORAL at 09:11

## 2022-11-22 RX ADMIN — MUPIROCIN 1 G: 20 OINTMENT TOPICAL at 12:11

## 2022-11-22 RX ADMIN — INSULIN DETEMIR 5 UNITS: 100 INJECTION, SOLUTION SUBCUTANEOUS at 09:11

## 2022-11-22 RX ADMIN — CARVEDILOL 25 MG: 25 TABLET, FILM COATED ORAL at 12:11

## 2022-11-22 RX ADMIN — APIXABAN 2.5 MG: 2.5 TABLET, FILM COATED ORAL at 12:11

## 2022-11-22 RX ADMIN — MUPIROCIN 1 G: 20 OINTMENT TOPICAL at 09:11

## 2022-11-22 RX ADMIN — ISOSORBIDE MONONITRATE 120 MG: 30 TABLET, EXTENDED RELEASE ORAL at 12:11

## 2022-11-22 RX ADMIN — LEVETIRACETAM 500 MG: 500 TABLET, FILM COATED ORAL at 12:11

## 2022-11-22 NOTE — PROGRESS NOTES
Date: November 22, 2022            The patient continues to be emotionally labile; she is crying.  On physical exam she is warm and well perfused.  I do not believe there is a role for pericardial window in this patient's therapy.

## 2022-11-22 NOTE — PLAN OF CARE
Problem: Adult Inpatient Plan of Care  Goal: Patient-Specific Goal (Individualized)  Outcome: Ongoing, Progressing     Problem: Adult Inpatient Plan of Care  Goal: Optimal Comfort and Wellbeing  Outcome: Ongoing, Progressing     Problem: Adult Inpatient Plan of Care  Goal: Readiness for Transition of Care  Outcome: Ongoing, Progressing

## 2022-11-22 NOTE — PROGRESS NOTES
Net UF 3 liters  HD catheter dressing changed   11/22/22 1205   Handoff Report   Received From [facility]   Given To Segundo   Vital Signs   Temp 98 °F (36.7 °C)   Temp src Oral   Pulse 80   Resp 18   /71   Assessments (Pre/Post)   Blood Liters Processed (BLP) 67   Transport Modality bed   Level of Consciousness (AVPU) alert   Dialyzer Clearance mildly streaked        Hemodialysis Catheter 07/26/22 1457 right internal jugular   Placement Date/Time: 07/26/22 1457   Present Prior to Hospital Arrival?: No  Hand Hygiene: Performed  Barrier Precautions: Performed  Skin Antisepsis: ChloraPrep  Hemodialysis Catheter Type: Tunneled catheter  Location: right internal jugular  Cathete...   Line Necessity Review CRRT/HD   Site Assessment No drainage;No redness;No swelling;No warmth   Line Securement Device Secured with sutures   Dressing Type Central line dressing   Dressing Status Clean;Dry;Intact   Dressing Intervention Sterile dressing change   Date on Dressing 11/22/22   Dressing Due to be Changed 11/29/22   Venous Patency/Care flushed w/o difficulty;blood return present   Arterial Patency/Care flushed w/o difficulty;blood return present   Post-Hemodialysis Assessment   Rinseback Volume (mL) 250 mL   Blood Volume Processed (Liters) 67 L   Dialyzer Clearance Lightly streaked   Duration of Treatment 180 minutes   Additional Fluid Intake (mL) 500 mL   Total UF (mL) 3500 mL   Net Fluid Removal 3000   Patient Response to Treatment Tolerated well   Post-Treatment Weight 53 kg (116 lb 13.5 oz)   Treatment Weight Change -10.6   Post-Hemodialysis Comments Tx complete, all blood returned per protocol, pt stable

## 2022-11-22 NOTE — PROGRESS NOTES
INPATIENT NEPHROLOGY CONSULT   [facility]    Patient Name: Tabby Howard  Date: 11/22/2022    Reason for consultation: ESRD    Chief Complaint:   Chief Complaint   Patient presents with    Abdominal Pain    Chest Pain    Back Pain     Since 3 am, Missed HD on Thursday and today       History of Present Illness:  33 y/o AAF with a history of ESRD on HD TTS, DMII, gastroparesis, HTN, CHF, and sickle cell trait who p/w severe abdominal pain since this AM, 10/10, all over her abdomen, no exac/reliev factors, missed 2 HD treatments, with history of noncompliance with dialysis, consulted for dialysis.     Interval History:  11/19- seen in ER, severe abd pain all over but not worse with palpation/abd soft/nondistended, spike in BP, no edema on exam  11/20- got off 3L with HD, u/s abd negative, severe abd pain  11.21  c/o nausea.  AFVSS    CT AP  1. Abnormal left kidney with evidence of fairly prominent perinephric stranding inflammatory response change attributed towards ongoing pyelonephritis.  2. Persistent bladder distention with evidence of prominent circumferential wall thickening attributed towards underlying cystitis..  3.. Diffuse body wall anasarca.  4. Large pericardial effusion stable.    Plan of Care:    Assessment:  Abdominal pain- suspect UTI/pyelo, bowel edema, and gastroparesis  ESRD on HD TTS with noncompliance  HTN/CHF- pericardial effusion  Hyponatremia  Hypokalemia  SHPT  Anemia of CKD    Plan:    - would treat for UTI/pyelo- continue to push UF with HD (her non compliance with dialysis with missed treatments is a contributing factor)- continue gastroparesis management-    - continue HD TTS schedule  - continue home BP meds  - continue 2-3L UF- f/u echo regarding pericardial effusion- noncompliance with dialysis is likely the culprit for this effusion- work on optimizing nutrition for fluid third spacing  - continue renal diet with 1.5L fluid restriction  - adjust dialysate  - phos  at goal for esrd patient  - started SHELLEY with HD    Thank you for allowing us to participate in this patient's care. We will continue to follow.    Vital Signs:  Temp Readings from Last 3 Encounters:   11/22/22 98 °F (36.7 °C) (Oral)   11/15/22 98 °F (36.7 °C)   11/07/22 98.6 °F (37 °C) (Oral)       Pulse Readings from Last 3 Encounters:   11/22/22 80   11/15/22 92   11/07/22 87       BP Readings from Last 3 Encounters:   11/22/22 130/71   11/15/22 (!) 148/77   11/07/22 (!) 143/97       Weight:  Wt Readings from Last 3 Encounters:   11/19/22 56 kg (123 lb 7.3 oz)   11/20/22 56 kg (123 lb 7.3 oz)   11/12/22 57.1 kg (125 lb 14.1 oz)         Medications:  Scheduled Meds:   amLODIPine  10 mg Oral Daily    apixaban  2.5 mg Oral BID    carvediloL  25 mg Oral BID    cloNIDine 0.2 mg/24 hr td ptwk  1 patch Transdermal Q7 Days    epoetin teresa-ebpx (RETACRIT) injection  50 Units/kg Intravenous Every Tues, Thurs, Sat    FLUoxetine  20 mg Oral Daily    gabapentin  100 mg Oral BID    hydrALAZINE  100 mg Oral Q8H    insulin detemir U-100  5 Units Subcutaneous Daily    isosorbide mononitrate  120 mg Oral Daily    levETIRAcetam  500 mg Oral BID    [START ON 11/23/2022] levoFLOXacin  250 mg Intravenous Q48H    metoclopramide HCl  5 mg Oral TID AC    mupirocin   Nasal BID     Continuous Infusions:  PRN Meds:.  No current facility-administered medications on file prior to encounter.     Current Outpatient Medications on File Prior to Encounter   Medication Sig Dispense Refill    albuterol (PROVENTIL/VENTOLIN HFA) 90 mcg/actuation inhaler Inhale 2 puffs into the lungs every 6 (six) hours as needed for Wheezing. Rescue 18 g 3    amLODIPine (NORVASC) 10 MG tablet Take 1 tablet (10 mg total) by mouth once daily. 30 tablet 11    apixaban (ELIQUIS) 5 mg Tab Take 1 tablet (5 mg total) by mouth 2 (two) times daily. 60 tablet 2    carvediloL (COREG) 25 MG tablet Take 1 tablet (25 mg total) by mouth 2 (two) times daily. 60 tablet 2    cloNIDine  "0.2 mg/24 hr td ptwk (CATAPRES) 0.2 mg/24 hr Place 1 patch onto the skin every 7 days. 4 patch 2    FLUoxetine 20 MG capsule Take 1 capsule (20 mg total) by mouth once daily. 30 capsule 11    gabapentin (NEURONTIN) 100 MG capsule Take 1 capsule (100 mg total) by mouth 2 (two) times daily. 90 capsule 2    hydrALAZINE (APRESOLINE) 100 MG tablet Take 1 tablet (100 mg total) by mouth every 8 (eight) hours. 90 tablet 2    insulin aspart U-100 (NOVOLOG) 100 unit/mL (3 mL) InPn pen Inject 1-10 Units into the skin before meals and at bedtime as needed (Hyperglycemia). Max daily dose 40 units. 3 mL 6    insulin detemir U-100 (LEVEMIR FLEXTOUCH) 100 unit/mL (3 mL) SubQ InPn pen Inject 5 Units into the skin once daily. Discard pen after 42 days. 6 mL 2    isosorbide mononitrate (IMDUR) 60 MG 24 hr tablet Take 2 tablets (120 mg total) by mouth once daily. 30 tablet 11    levETIRAcetam (KEPPRA) 500 MG Tab Take 1 tablet (500 mg total) by mouth 2 (two) times daily. 60 tablet 11    methocarbamoL (ROBAXIN) 500 MG Tab Take 1 tablet (500 mg total) by mouth 3 (three) times daily. 90 tablet 1    ondansetron (ZOFRAN) 4 MG tablet Take 1 tablet (4 mg total) by mouth every 8 (eight) hours as needed for Nausea. 60 tablet 2    pantoprazole (PROTONIX) 40 MG tablet Take 40 mg by mouth once daily.      pen needle, diabetic (BD ULTRA-FINE MAGALIE PEN NEEDLE) 32 gauge x 5/32" Ndle Inject into the skin 5 times per day with insulin 100 each 12    [DISCONTINUED] atenoloL (TENORMIN) 50 MG tablet Take 1 tablet (50 mg total) by mouth every other day. 45 tablet 3    [DISCONTINUED] omeprazole (PRILOSEC) 20 MG capsule Take 2 capsules (40 mg total) by mouth once daily. for 10 days 20 capsule 0    [DISCONTINUED] torsemide (DEMADEX) 20 MG Tab Take 1 tablet (20 mg total) by mouth 2 (two) times a day. (Patient taking differently: Take 20 mg by mouth once daily.) 60 tablet 1       Review of Systems:  Neg    Physical Exam:  Constitutional: tearful, aao x 3  Heart: " rrr, no m/r/g, wwp, no edema  Lungs: ctab, no w/r/r/c, no lb  Abdomen: s/nt/nd, +BS    Results:  Lab Results   Component Value Date     (L) 11/22/2022    K 5.2 (H) 11/22/2022    CL 95 11/22/2022    CO2 25 11/22/2022    BUN 51 (H) 11/22/2022    CREATININE 5.1 (H) 11/22/2022    CALCIUM 8.1 (L) 11/22/2022    ANIONGAP 8 11/22/2022    ESTGFRAFRICA 20 (A) 07/31/2022    EGFRNONAA 18 (A) 07/31/2022       Lab Results   Component Value Date    CALCIUM 8.1 (L) 11/22/2022    PHOS 2.9 11/20/2022       Recent Labs   Lab 11/22/22  0400   WBC 4.60   RBC 2.78*   HGB 7.9*   HCT 23.0*   *   MCV 83   MCH 28.4   MCHC 34.3         I have personally reviewed pertinent radiological imaging and reports.    I have spent > 35 minutes providing care for this patient for the above diagnoses. These services have included chart/data/imaging review, evaluation, exam, formulation of plan, , note preparation, and discussions with staff involved in this patient's care.    Hugh Figueroa MD  Nephrology  [facility] [place]  [phone]  )

## 2022-11-22 NOTE — PROGRESS NOTES
Pharmacist Renal Dose Adjustment Note    Tabby Howard is a 32 y.o. female being treated with the medication Levofloxacin    Patient Data:    Vital Signs (Most Recent):  Temp: 97.7 °F (36.5 °C) (11/22/22 0728)  Pulse: 83 (11/22/22 0728)  Resp: 18 (11/22/22 0728)  BP: (!) 142/95 (11/22/22 0728)  SpO2: 96 % (11/22/22 0728)   Vital Signs (72h Range):  Temp:  [97.5 °F (36.4 °C)-98.6 °F (37 °C)]   Pulse:  [76-96]   Resp:  [16-24]   BP: (115-187)/()   SpO2:  [94 %-100 %]      Recent Labs   Lab 11/20/22  0210 11/21/22  0558 11/22/22  0400   CREATININE 2.9* 4.4* 5.1*     Serum creatinine: 5.1 mg/dL (H) 11/22/22 0400  Estimated creatinine clearance: 12.4 mL/min (A)    Levofloxacin 250 mg IV q 24 hrs will be changed to Levofloxacin 250 mg IV q 48 hrs due to ESRD on dialysis.    Pharmacist's Name: Chito Parson  Pharmacist's Extension: [phone]

## 2022-11-22 NOTE — CONSULTS
[facility]  Cardiology  Progress Note    Patient Name: Tabby Howard  MRN: [id number]  Admission Date: 11/19/2022  Hospital Length of Stay: 1 days  Code Status: Full Code   Attending Physician: Isai Grady, *   Primary Care Physician: [facility]  Expected Discharge Date: 11/24/2022  Principal Problem:Gastroparesis    Subjective:     Hospital Course:  Check blood pressures today manually  Blood pressure supine is 140/85 with 8 mm pulses paradoxic us  Blood pressure sitting in the right arm is 140/80 with proximally 10 mm pulses paradoxic      Interval History:     ROS  Objective:     Vital Signs (Most Recent):  Temp: 98 °F (36.7 °C) (11/22/22 1205)  Pulse: 86 (11/22/22 1656)  Resp: 20 (11/22/22 1656)  BP: (!) 160/104 (11/22/22 1656)  SpO2: 100 % (11/22/22 1656)   Vital Signs (24h Range):  Temp:  [97.6 °F (36.4 °C)-98.2 °F (36.8 °C)] 98 °F (36.7 °C)  Pulse:  [75-86] 86  Resp:  [18-20] 20  SpO2:  [96 %-100 %] 100 %  BP: (118-160)/() 160/104     Weight: 56 kg (123 lb 7.3 oz)  Body mass index is 22.58 kg/m².    SpO2: 100 %  O2 Device (Oxygen Therapy): room air      Intake/Output Summary (Last 24 hours) at 11/22/2022 1734  Last data filed at 11/22/2022 1205  Gross per 24 hour   Intake 740 ml   Output 3500 ml   Net -2760 ml       Lines/Drains/Airways       Central Venous Catheter Line  Duration                  Hemodialysis Catheter 07/26/22 1457 right internal jugular 119 days              Peripheral Intravenous Line  Duration                  Peripheral IV - Single Lumen 11/19/22 1200 20 G Posterior;Right Forearm 3 days                    Physical Exam lungs are clear  Cardiac sounds normal  Abdomen slightly distended and mild tenderness    Significant Labs: CMP   Recent Labs   Lab 11/21/22  0558 11/21/22  1318 11/22/22  0400   *  --  128*   K 4.2  --  5.2*   CL 94*  --  95   CO2 25  --  25   * 414* 280*   BUN 38*  --  51*   CREATININE 4.4*  --   5.1*   CALCIUM 8.1*  --  8.1*   PROT 6.1  --  5.8*   ALBUMIN 2.8*  --  2.7*   BILITOT 1.3*  --  1.2*   ALKPHOS 212*  --  212*   AST 27  --  29   ALT 39  --  39   ANIONGAP 10  --  8    and CBC   Recent Labs   Lab 11/21/22  0558 11/22/22  0400   WBC 4.76 4.60   HGB 8.5* 7.9*   HCT 25.1* 23.0*   * 104*       Significant Imaging:   Assessment and Plan:   At this time there is no clinical evidence of tamponade or constriction by physical examination-agree with not doing pericardial window  Etiology of elevated liver function tests is being evaluated  Brief HPI:  Re-consult p.r.n.    Active Diagnoses:    Diagnosis Date Noted POA    PRINCIPAL PROBLEM:  Gastroparesis - suspected, unconfirmed [K31.84] 04/12/2022 Yes     Chronic      Problems Resolved During this Admission:       VTE Risk Mitigation (From admission, onward)           Ordered     apixaban tablet 2.5 mg  2 times daily         11/21/22 0711     Reason for No Pharmacological VTE Prophylaxis  Once        Question:  Reasons:  Answer:  Already adequately anticoagulated on oral Anticoagulants    11/19/22 1821     IP VTE HIGH RISK PATIENT  Once         11/19/22 1821                    Osbaldo Hart MD  Cardiology  [facility]

## 2022-11-23 ENCOUNTER — TELEPHONE (OUTPATIENT)
Dept: MEDSURG UNIT | Facility: HOSPITAL | Age: 32
End: 2022-11-23
Payer: MEDICAID

## 2022-11-23 VITALS
TEMPERATURE: 98 F | HEIGHT: 62 IN | SYSTOLIC BLOOD PRESSURE: 157 MMHG | RESPIRATION RATE: 16 BRPM | HEART RATE: 87 BPM | WEIGHT: 123.44 LBS | BODY MASS INDEX: 22.71 KG/M2 | DIASTOLIC BLOOD PRESSURE: 89 MMHG | OXYGEN SATURATION: 98 %

## 2022-11-23 LAB
ALBUMIN SERPL BCP-MCNC: 2.7 G/DL (ref 3.5–5.2)
ALP SERPL-CCNC: 231 U/L (ref 55–135)
ALT SERPL W/O P-5'-P-CCNC: 47 U/L (ref 10–44)
ANION GAP SERPL CALC-SCNC: 6 MMOL/L (ref 8–16)
AST SERPL-CCNC: 42 U/L (ref 10–40)
BASOPHILS # BLD AUTO: 0.02 K/UL (ref 0–0.2)
BASOPHILS NFR BLD: 0.5 % (ref 0–1.9)
BILIRUB SERPL-MCNC: 0.9 MG/DL (ref 0.1–1)
BUN SERPL-MCNC: 35 MG/DL (ref 6–20)
CALCIUM SERPL-MCNC: 7.9 MG/DL (ref 8.7–10.5)
CHLORIDE SERPL-SCNC: 95 MMOL/L (ref 95–110)
CO2 SERPL-SCNC: 23 MMOL/L (ref 23–29)
CREAT SERPL-MCNC: 3.8 MG/DL (ref 0.5–1.4)
DIFFERENTIAL METHOD: ABNORMAL
EOSINOPHIL # BLD AUTO: 0.1 K/UL (ref 0–0.5)
EOSINOPHIL NFR BLD: 2.4 % (ref 0–8)
ERYTHROCYTE [DISTWIDTH] IN BLOOD BY AUTOMATED COUNT: 14.3 % (ref 11.5–14.5)
EST. GFR  (NO RACE VARIABLE): 15.4 ML/MIN/1.73 M^2
GLUCOSE SERPL-MCNC: 267 MG/DL (ref 70–110)
GLUCOSE SERPL-MCNC: 325 MG/DL (ref 70–110)
HCT VFR BLD AUTO: 22.7 % (ref 37–48.5)
HGB BLD-MCNC: 7.6 G/DL (ref 12–16)
IMM GRANULOCYTES # BLD AUTO: 0.02 K/UL (ref 0–0.04)
IMM GRANULOCYTES NFR BLD AUTO: 0.5 % (ref 0–0.5)
LYMPHOCYTES # BLD AUTO: 0.8 K/UL (ref 1–4.8)
LYMPHOCYTES NFR BLD: 19.3 % (ref 18–48)
MCH RBC QN AUTO: 27.7 PG (ref 27–31)
MCHC RBC AUTO-ENTMCNC: 33.5 G/DL (ref 32–36)
MCV RBC AUTO: 83 FL (ref 82–98)
MONOCYTES # BLD AUTO: 0.5 K/UL (ref 0.3–1)
MONOCYTES NFR BLD: 11 % (ref 4–15)
NEUTROPHILS # BLD AUTO: 2.8 K/UL (ref 1.8–7.7)
NEUTROPHILS NFR BLD: 66.3 % (ref 38–73)
NRBC BLD-RTO: 0 /100 WBC
PLATELET # BLD AUTO: 100 K/UL (ref 150–450)
PMV BLD AUTO: 9.9 FL (ref 9.2–12.9)
POTASSIUM SERPL-SCNC: 4.7 MMOL/L (ref 3.5–5.1)
PROT SERPL-MCNC: 5.7 G/DL (ref 6–8.4)
RBC # BLD AUTO: 2.74 M/UL (ref 4–5.4)
SODIUM SERPL-SCNC: 124 MMOL/L (ref 136–145)
WBC # BLD AUTO: 4.2 K/UL (ref 3.9–12.7)

## 2022-11-23 PROCEDURE — 85025 COMPLETE CBC W/AUTO DIFF WBC: CPT | Performed by: NURSE PRACTITIONER

## 2022-11-23 PROCEDURE — 90935 HEMODIALYSIS ONE EVALUATION: CPT

## 2022-11-23 PROCEDURE — 25000003 PHARM REV CODE 250: Performed by: INTERNAL MEDICINE

## 2022-11-23 PROCEDURE — 36415 COLL VENOUS BLD VENIPUNCTURE: CPT | Performed by: STUDENT IN AN ORGANIZED HEALTH CARE EDUCATION/TRAINING PROGRAM

## 2022-11-23 PROCEDURE — 80053 COMPREHEN METABOLIC PANEL: CPT | Performed by: STUDENT IN AN ORGANIZED HEALTH CARE EDUCATION/TRAINING PROGRAM

## 2022-11-23 PROCEDURE — 25000003 PHARM REV CODE 250: Performed by: NURSE PRACTITIONER

## 2022-11-23 RX ADMIN — METOCLOPRAMIDE HYDROCHLORIDE 5 MG: 5 TABLET ORAL at 06:11

## 2022-11-23 RX ADMIN — HYDROCODONE BITARTRATE AND ACETAMINOPHEN 1 TABLET: 7.5; 325 TABLET ORAL at 06:11

## 2022-11-23 RX ADMIN — HYDRALAZINE HYDROCHLORIDE 100 MG: 25 TABLET ORAL at 06:11

## 2022-11-23 NOTE — PLAN OF CARE
Problem: Infection  Goal: Absence of Infection Signs and Symptoms  Outcome: Ongoing, Progressing     Problem: Device-Related Complication Risk (Hemodialysis)  Goal: Safe, Effective Therapy Delivery  Outcome: Ongoing, Progressing     Problem: Hemodynamic Instability (Hemodialysis)  Goal: Effective Tissue Perfusion  Outcome: Ongoing, Progressing     Problem: Adult Inpatient Plan of Care  Goal: Plan of Care Review  Outcome: Ongoing, Progressing     Problem: Diabetes Comorbidity  Goal: Blood Glucose Level Within Targeted Range  Outcome: Ongoing, Progressing     Problem: Pain Acute  Goal: Acceptable Pain Control and Functional Ability  Outcome: Ongoing, Progressing

## 2022-11-23 NOTE — PROGRESS NOTES
INPATIENT NEPHROLOGY PROGRESS  [facility]    Patient Name: Tabby Howard  Date: 11/23/2022    Reason for consultation: ESRD    Chief Complaint:   Chief Complaint   Patient presents with    Abdominal Pain    Chest Pain    Back Pain     Since 3 am, Missed HD on Thursday and today       History of Present Illness:  33 y/o AAF with a history of ESRD on HD TTS, DMII, gastroparesis, HTN, CHF, and sickle cell trait who p/w severe abdominal pain since this AM, 10/10, all over her abdomen, no exac/reliev factors, missed 2 HD treatments, with history of noncompliance with dialysis, consulted for dialysis.     Interval History:  11/19- seen in ER, severe abd pain all over but not worse with palpation/abd soft/nondistended, spike in BP, no edema on exam  11/20- got off 3L with HD, u/s abd negative, severe abd pain  11.21  c/o nausea.  AFVSS  11/23  VSS, lab results reviewed.  Seen on dialysis, NAD noted.  Running today 2/2 tomorrow is a holdiay.    CT AP  1. Abnormal left kidney with evidence of fairly prominent perinephric stranding inflammatory response change attributed towards ongoing pyelonephritis.  2. Persistent bladder distention with evidence of prominent circumferential wall thickening attributed towards underlying cystitis..  3.. Diffuse body wall anasarca.  4. Large pericardial effusion stable.    Plan of Care:    Assessment:  Abdominal pain- suspect UTI/pyelo, bowel edema, and gastroparesis  ESRD on HD TTS with noncompliance  HTN/CHF- pericardial effusion  Hyponatremia  Hypokalemia  SHPT  Anemia of CKD    Plan:    - would treat for UTI/pyelo- continue to push UF with HD (her non compliance with dialysis with missed treatments is a contributing factor)- continue gastroparesis management-    - continue HD TTS schedule  - continue home BP meds  - continue 2-3L UF- f/u echo regarding pericardial effusion- noncompliance with dialysis is likely the culprit for this effusion- work on optimizing  nutrition for fluid third spacing  - continue renal diet with 1.5L fluid restriction  - adjust dialysate  - phos at goal for esrd patient  - started SHELLEY with HD    Thank you for allowing us to participate in this patient's care. We will continue to follow.    Vital Signs:  Temp Readings from Last 3 Encounters:   11/23/22 98.6 °F (37 °C) (Oral)   11/15/22 98 °F (36.7 °C)   11/07/22 98.6 °F (37 °C) (Oral)       Pulse Readings from Last 3 Encounters:   11/23/22 83   11/15/22 92   11/07/22 87       BP Readings from Last 3 Encounters:   11/23/22 137/85   11/15/22 (!) 148/77   11/07/22 (!) 143/97       Weight:  Wt Readings from Last 3 Encounters:   11/19/22 56 kg (123 lb 7.3 oz)   11/20/22 56 kg (123 lb 7.3 oz)   11/12/22 57.1 kg (125 lb 14.1 oz)         Medications:  Scheduled Meds:   amLODIPine  10 mg Oral Daily    apixaban  2.5 mg Oral BID    carvediloL  25 mg Oral BID    cloNIDine 0.2 mg/24 hr td ptwk  1 patch Transdermal Q7 Days    epoetin teresa-ebpx (RETACRIT) injection  50 Units/kg Intravenous Every Tues, Thurs, Sat    FLUoxetine  20 mg Oral Daily    gabapentin  100 mg Oral BID    hydrALAZINE  100 mg Oral Q8H    insulin detemir U-100  5 Units Subcutaneous Daily    isosorbide mononitrate  120 mg Oral Daily    levETIRAcetam  500 mg Oral BID    levoFLOXacin  250 mg Intravenous Q48H    metoclopramide HCl  5 mg Oral TID AC    mupirocin   Nasal BID     Continuous Infusions:  PRN Meds:.  No current facility-administered medications on file prior to encounter.     Current Outpatient Medications on File Prior to Encounter   Medication Sig Dispense Refill    albuterol (PROVENTIL/VENTOLIN HFA) 90 mcg/actuation inhaler Inhale 2 puffs into the lungs every 6 (six) hours as needed for Wheezing. Rescue 18 g 3    amLODIPine (NORVASC) 10 MG tablet Take 1 tablet (10 mg total) by mouth once daily. 30 tablet 11    apixaban (ELIQUIS) 5 mg Tab Take 1 tablet (5 mg total) by mouth 2 (two) times daily. 60 tablet 2    carvediloL (COREG) 25 MG  "tablet Take 1 tablet (25 mg total) by mouth 2 (two) times daily. 60 tablet 2    cloNIDine 0.2 mg/24 hr td ptwk (CATAPRES) 0.2 mg/24 hr Place 1 patch onto the skin every 7 days. 4 patch 2    FLUoxetine 20 MG capsule Take 1 capsule (20 mg total) by mouth once daily. 30 capsule 11    gabapentin (NEURONTIN) 100 MG capsule Take 1 capsule (100 mg total) by mouth 2 (two) times daily. 90 capsule 2    hydrALAZINE (APRESOLINE) 100 MG tablet Take 1 tablet (100 mg total) by mouth every 8 (eight) hours. 90 tablet 2    insulin aspart U-100 (NOVOLOG) 100 unit/mL (3 mL) InPn pen Inject 1-10 Units into the skin before meals and at bedtime as needed (Hyperglycemia). Max daily dose 40 units. 3 mL 6    insulin detemir U-100 (LEVEMIR FLEXTOUCH) 100 unit/mL (3 mL) SubQ InPn pen Inject 5 Units into the skin once daily. Discard pen after 42 days. 6 mL 2    isosorbide mononitrate (IMDUR) 60 MG 24 hr tablet Take 2 tablets (120 mg total) by mouth once daily. 30 tablet 11    levETIRAcetam (KEPPRA) 500 MG Tab Take 1 tablet (500 mg total) by mouth 2 (two) times daily. 60 tablet 11    methocarbamoL (ROBAXIN) 500 MG Tab Take 1 tablet (500 mg total) by mouth 3 (three) times daily. 90 tablet 1    ondansetron (ZOFRAN) 4 MG tablet Take 1 tablet (4 mg total) by mouth every 8 (eight) hours as needed for Nausea. 60 tablet 2    pantoprazole (PROTONIX) 40 MG tablet Take 40 mg by mouth once daily.      pen needle, diabetic (BD ULTRA-FINE MAGALIE PEN NEEDLE) 32 gauge x 5/32" Ndle Inject into the skin 5 times per day with insulin 100 each 12    [DISCONTINUED] atenoloL (TENORMIN) 50 MG tablet Take 1 tablet (50 mg total) by mouth every other day. 45 tablet 3    [DISCONTINUED] omeprazole (PRILOSEC) 20 MG capsule Take 2 capsules (40 mg total) by mouth once daily. for 10 days 20 capsule 0    [DISCONTINUED] torsemide (DEMADEX) 20 MG Tab Take 1 tablet (20 mg total) by mouth 2 (two) times a day. (Patient taking differently: Take 20 mg by mouth once daily.) 60 tablet 1 "       Review of Systems:  Neg    Physical Exam:  Constitutional: tearful, aao x 3  Heart: rrr, no m/r/g, wwp, no edema  Lungs: ctab, no w/r/r/c, no lb  Abdomen: s/nt/nd, +BS    Results:  Lab Results   Component Value Date     (L) 11/23/2022    K 4.7 11/23/2022    CL 95 11/23/2022    CO2 23 11/23/2022    BUN 35 (H) 11/23/2022    CREATININE 3.8 (H) 11/23/2022    CALCIUM 7.9 (L) 11/23/2022    ANIONGAP 6 (L) 11/23/2022    ESTGFRAFRICA 20 (A) 07/31/2022    EGFRNONAA 18 (A) 07/31/2022       Lab Results   Component Value Date    CALCIUM 7.9 (L) 11/23/2022    PHOS 2.9 11/20/2022       Recent Labs   Lab 11/23/22  0443   WBC 4.20   RBC 2.74*   HGB 7.6*   HCT 22.7*   *   MCV 83   MCH 27.7   MCHC 33.5         I have personally reviewed pertinent radiological imaging and reports.    I have spent > 35 minutes providing care for this patient for the above diagnoses. These services have included chart/data/imaging review, evaluation, exam, formulation of plan, , note preparation, and discussions with staff involved in this patient's care.    Hugh Figueroa MD  Nephrology  [facility] [place]  [phone]  )

## 2022-11-23 NOTE — PROGRESS NOTES
Net UF 3 liters  Tolerated tx well       11/23/22 1400   Handoff Report   Received From Stephanie Mena   Given To [place]   Vital Signs   Temp 97.7 °F (36.5 °C)   Temp src Oral   Pulse 87   Resp 16   SpO2 98 %   BP (!) 157/89   Assessments (Pre/Post)   Blood Liters Processed (BLP) 66   Transport Modality bed   Level of Consciousness (AVPU) alert   Dialyzer Clearance mildly streaked        Hemodialysis Catheter 07/26/22 1457 right internal jugular   Placement Date/Time: 07/26/22 1457   Present Prior to Hospital Arrival?: No  Hand Hygiene: Performed  Barrier Precautions: Performed  Skin Antisepsis: ChloraPrep  Hemodialysis Catheter Type: Tunneled catheter  Location: right internal jugular  Cathete...   Line Necessity Review CRRT/HD   Site Assessment No drainage;No redness;No swelling;No warmth   Line Securement Device Secured with sutures   Dressing Type Central line dressing   Dressing Status Clean;Dry;Intact   Dressing Intervention Integrity maintained   Date on Dressing 11/22/22   Dressing Due to be Changed 11/29/22   Venous Patency/Care flushed w/o difficulty;blood return present   Arterial Patency/Care flushed w/o difficulty;blood return present   Post-Hemodialysis Assessment   Rinseback Volume (mL) 250 mL   Blood Volume Processed (Liters) 67 L   Dialyzer Clearance Lightly streaked   Duration of Treatment 180 minutes   Additional Fluid Intake (mL) 500 mL   Total UF (mL) 3500 mL   Net Fluid Removal 3000   Patient Response to Treatment Tolerated well   Post-Treatment Weight 57.5 kg (126 lb 12.2 oz)   Treatment Weight Change -3   Post-Hemodialysis Comments Tx complete, tolerated well, all blood reinfused per protocol

## 2022-11-24 LAB
BACTERIA BLD CULT: NORMAL
BACTERIA BLD CULT: NORMAL

## 2022-11-25 NOTE — PLAN OF CARE
11/25/22 1354   Final Note   Assessment Type Final Discharge Note   Anticipated Discharge Disposition Home   What phone number can be called within the next 1-3 days to see how you are doing after discharge? [phone]

## 2022-11-28 NOTE — DISCHARGE SUMMARY
[facility]  Discharge Summary  Patient Name: Tabby Howard MRN: [id number]   Patient Class: IP- Inpatient  Length of Stay: 2   Admission Date: 11/19/2022 11:22 AM Attending Physician: Shamir Grady MD   Primary Care Provider: [facility] Face-to-Face encounter date: 11/23/22   Chief Complaint: Abdominal Pain, Chest Pain, and Back Pain (Since 3 am, Missed HD on Thursday and today)    Date of Discharge: 11/23/22  Discharge Disposition:Home or Self Care    Condition: Stable       Reason for Hospitalization     Active Hospital Problems    Diagnosis    *Gastroparesis - suspected, unconfirmed         Brief History of Present Illness    Tabby Howard is a 32 y.o.  female who  has a past medical history of CKD (chronic kidney disease), stage IV (4/12/2022), Diabetes mellitus due to underlying condition with unspecified complications (4/12/2022), Gastroparesis (4/12/2022), Heart failure with preserved ejection fraction (4/12/2022), History of gastroesophageal reflux (GERD), History of supraventricular tachycardia, Hyperkalemia (7/7/2022), Hypertensive emergency (7/8/2022), Sickle cell trait (4/12/2022), and Type 2 diabetes mellitus.. The patient presented to [facility] on 11/19/2022 with a primary complaint of Abdominal Pain, Chest Pain, and Back Pain (Since 3 am, Missed HD on Thursday and today)  .     For the full HPI please refer to the History & Physical from this admission.    Hospital Course By Problem with Pertinent Findings      Intractable Nausea with vomiting Generalized abdominal pain   Chronic condition /possible pyelonephritis in CT   CT abdomen and pelvis without contrast results reviewed, no acute intra-abdominal process noted. Diffused edema s/t volume overload  Symptoms are likely related to underlying gastroparesis  and possible pyelonephritis   IV  levaquin   Urine culture need to collect         2. ESRD (end stage renal disease)  hemodialysis  "as per nephro        3. Gastroparesis  Reglan IV   PPI      4. H/O Deep vein thrombosis (DVT) of non-extremity vein  Continue Eliquis       5. Seizure  Continue Keppra   seizure precautions.        6. Diabetes mellitus due to underlying condition with unspecified                 complications  Low dose NovoLog insulin sliding scale  Hypoglycemic protocol  Diabetic cardiac renal diet once eating     7. Chronic Moderate Pericardial  Effusion      cardiology consulted . Suspicious for cardiac amyloidosis in the past    At this time there is no clinical evidence of tamponade or constriction by physical examination-agree with not doing pericardial window  Etiology of elevated liver function tests is being evaluated        8. HTN (hypertension)  Continue use of home medications to manage  Use intravenous hydralazine 10 mg IV q.6 hours p.r.n. for systolic blood pressure above 160 mmHg.  Possible DC soon          Patient was seen and examined on the date of discharge and determined to be suitable for discharge.    Physical Exam  BP (!) 157/89   Pulse 87   Temp 97.7 °F (36.5 °C) (Oral)   Resp 16   Ht 5' 2" (1.575 m)   Wt 56 kg (123 lb 7.3 oz)   SpO2 98%   Breastfeeding No   BMI 22.58 kg/m²   Vitals reviewed.    Constitutional: No distress.   HENT: Atraumatic.   Cardiovascular: Normal rate, regular rhythm and normal heart sounds.   Pulmonary/Chest: Effort normal. Clear to auscultation bilaterally. No wheezes.   Abdominal: Soft. Bowel sounds are normal. Exhibits no distension and no mass. No tenderness  Neurological: Alert.   Skin: Skin is warm and dry.     Following labs were Reviewed   No results for input(s): WBC, HGB, HCT, PLT, GLUCOSE, CALCIUM, ALBUMIN, PROT, NA, K, CO2, CL, BUN, CREATININE, ALKPHOS, ALT, AST, BILITOT in the last 24 hours.  No results found for: POCTGLUCOSE     All labs within the past 24 hours have been reviewed    Microbiology Results (last 7 days)       Procedure Component Value Units Date/Time " "   Urine culture [[id number]] Collected: 11/19/22 1210    Order Status: Completed Specimen: Urine Updated: 11/22/22 1044     Urine Culture, Routine No growth    Narrative:      Specimen Source->Urine    Urine Culture High Risk [[id number]]     Order Status: Canceled Specimen: Urine           X-Ray Chest PA And Lateral   Final Result      US Abdomen Limited (Gallbladder)   Final Result      CT Abdomen Pelvis  Without Contrast   Final Result          No results found for this or any previous visit.      Consultants and Procedures   Consultants:  Consults (From admission, onward)          Status Ordering Provider     Inpatient consult to Cardiothoracic Surgery  Once        Provider:  Zaid Min MD    Completed OSBALDO HART     Inpatient consult to Cardiology  Once        Provider:  Osbaldo Hart MD    Completed ROSALINE ZULUAGA            Procedures:   * No surgery found *     Discharge Information:   Diet:  Resume renal/cardiac diet    Physical Activity:  Activity as tolerated    Instructions:  1. Take all medications as prescribed  2. Keep all follow-up appointments    Follow-Up Appointments:  Please call your primary care physician to schedule an appointment in 1 week time.     Follow-up Information       Prateek Clark MD Follow up in 1 week(s).    Specialty: Nephrology  Why: As needed  Contact information:  [address] [place] [address] NEPHROLOGY [facility]  [facility] [address]  [phone]                                 Discharge Medications:     Medication List        CONTINUE taking these medications      albuterol 90 mcg/actuation inhaler  Commonly known as: PROVENTIL/VENTOLIN HFA  Inhale 2 puffs into the lungs every 6 (six) hours as needed for Wheezing. Rescue     amLODIPine 10 MG tablet  Commonly known as: NORVASC  Take 1 tablet (10 mg total) by mouth once daily.     BD ULTRA-FINE MAGALIE PEN NEEDLE 32 gauge x 5/32" Ndle  Generic drug: pen needle, diabetic  Inject into the skin 5 times per day with " insulin     carvediloL 25 MG tablet  Commonly known as: COREG  Take 1 tablet (25 mg total) by mouth 2 (two) times daily.     cloNIDine 0.2 mg/24 hr td ptwk 0.2 mg/24 hr  Commonly known as: CATAPRES  Place 1 patch onto the skin every 7 days.     ELIQUIS 5 mg Tab  Generic drug: apixaban  Take 1 tablet (5 mg total) by mouth 2 (two) times daily.     FLUoxetine 20 MG capsule  Take 1 capsule (20 mg total) by mouth once daily.     gabapentin 100 MG capsule  Commonly known as: NEURONTIN  Take 1 capsule (100 mg total) by mouth 2 (two) times daily.     hydrALAZINE 100 MG tablet  Commonly known as: APRESOLINE  Take 1 tablet (100 mg total) by mouth every 8 (eight) hours.     isosorbide mononitrate 60 MG 24 hr tablet  Commonly known as: IMDUR  Take 2 tablets (120 mg total) by mouth once daily.     LEVEMIR FLEXTOUCH U-100 INSULN 100 unit/mL (3 mL) Inpn pen  Generic drug: insulin detemir U-100  Inject 5 Units into the skin once daily. Discard pen after 42 days.     levETIRAcetam 500 MG Tab  Commonly known as: KEPPRA  Take 1 tablet (500 mg total) by mouth 2 (two) times daily.     methocarbamoL 500 MG Tab  Commonly known as: ROBAXIN  Take 1 tablet (500 mg total) by mouth 3 (three) times daily.     NovoLOG Flexpen U-100 Insulin 100 unit/mL (3 mL) Inpn pen  Generic drug: insulin aspart U-100  Inject 1-10 Units into the skin before meals and at bedtime as needed (Hyperglycemia). Max daily dose 40 units.     ondansetron 4 MG tablet  Commonly known as: ZOFRAN  Take 1 tablet (4 mg total) by mouth every 8 (eight) hours as needed for Nausea.     pantoprazole 40 MG tablet  Commonly known as: PROTONIX                I spent 35 minutes preparing the discharge including reviewing records from previous encounters, preparation of discharge summary, assessing and final examination of the patient, discharge medicine reconciliation, discussing plan of care, follow up and education and prescriptions.       Isai Grady  [facility]  Hospitalist  11/23/22

## 2022-11-30 ENCOUNTER — HOSPITAL ENCOUNTER (INPATIENT)
Facility: HOSPITAL | Age: 32
LOS: 7 days | Discharge: HOME | DRG: 073 | End: 2022-12-07
Attending: EMERGENCY MEDICINE | Admitting: HOSPITALIST
Payer: MEDICAID

## 2022-11-30 DIAGNOSIS — R11.2 NAUSEA AND VOMITING, UNSPECIFIED VOMITING TYPE: ICD-10-CM

## 2022-11-30 DIAGNOSIS — Z79.4 TYPE 2 DIABETES MELLITUS WITH CHRONIC KIDNEY DISEASE ON CHRONIC DIALYSIS, WITH LONG-TERM CURRENT USE OF INSULIN: Chronic | ICD-10-CM

## 2022-11-30 DIAGNOSIS — R10.84 INTRACTABLE GENERALIZED ABDOMINAL PAIN: Primary | ICD-10-CM

## 2022-11-30 DIAGNOSIS — N18.6 TYPE 2 DIABETES MELLITUS WITH CHRONIC KIDNEY DISEASE ON CHRONIC DIALYSIS, WITH LONG-TERM CURRENT USE OF INSULIN: Chronic | ICD-10-CM

## 2022-11-30 DIAGNOSIS — F43.20 ADJUSTMENT DISORDER, UNSPECIFIED TYPE: ICD-10-CM

## 2022-11-30 DIAGNOSIS — R07.9 CHEST PAIN, RULE OUT ACUTE MYOCARDIAL INFARCTION: ICD-10-CM

## 2022-11-30 DIAGNOSIS — Z99.2 TYPE 2 DIABETES MELLITUS WITH CHRONIC KIDNEY DISEASE ON CHRONIC DIALYSIS, WITH LONG-TERM CURRENT USE OF INSULIN: Chronic | ICD-10-CM

## 2022-11-30 DIAGNOSIS — R11.2 INTRACTABLE NAUSEA AND VOMITING: ICD-10-CM

## 2022-11-30 DIAGNOSIS — R10.13 EPIGASTRIC PAIN: ICD-10-CM

## 2022-11-30 DIAGNOSIS — E11.22 TYPE 2 DIABETES MELLITUS WITH CHRONIC KIDNEY DISEASE ON CHRONIC DIALYSIS, WITH LONG-TERM CURRENT USE OF INSULIN: Chronic | ICD-10-CM

## 2022-11-30 DIAGNOSIS — R07.9 CHEST PAIN: ICD-10-CM

## 2022-11-30 DIAGNOSIS — R94.31 PROLONGED QT INTERVAL: ICD-10-CM

## 2022-11-30 LAB
ALBUMIN SERPL BCP-MCNC: 3.8 G/DL (ref 3.5–5.2)
ALLENS TEST: ABNORMAL
ALP SERPL-CCNC: 477 U/L (ref 55–135)
ALT SERPL W/O P-5'-P-CCNC: 129 U/L (ref 10–44)
ANION GAP SERPL CALC-SCNC: 19 MMOL/L (ref 8–16)
AST SERPL-CCNC: 59 U/L (ref 10–40)
BASOPHILS # BLD AUTO: 0.03 K/UL (ref 0–0.2)
BASOPHILS NFR BLD: 0.4 % (ref 0–1.9)
BILIRUB SERPL-MCNC: 2.8 MG/DL (ref 0.1–1)
BUN SERPL-MCNC: 35 MG/DL (ref 6–20)
CALCIUM SERPL-MCNC: 10.1 MG/DL (ref 8.7–10.5)
CHLORIDE SERPL-SCNC: 92 MMOL/L (ref 95–110)
CO2 SERPL-SCNC: 23 MMOL/L (ref 23–29)
CREAT SERPL-MCNC: 4.5 MG/DL (ref 0.5–1.4)
DELSYS: ABNORMAL
DIFFERENTIAL METHOD: ABNORMAL
EOSINOPHIL # BLD AUTO: 0 K/UL (ref 0–0.5)
EOSINOPHIL NFR BLD: 0.4 % (ref 0–8)
ERYTHROCYTE [DISTWIDTH] IN BLOOD BY AUTOMATED COUNT: 14.3 % (ref 11.5–14.5)
EST. GFR  (NO RACE VARIABLE): 13 ML/MIN/1.73 M^2
FIO2: 21
GLUCOSE SERPL-MCNC: 587 MG/DL (ref 70–110)
HCO3 UR-SCNC: 26.6 MMOL/L (ref 24–28)
HCT VFR BLD AUTO: 22.4 % (ref 37–48.5)
HGB BLD-MCNC: 7.7 G/DL (ref 12–16)
IMM GRANULOCYTES # BLD AUTO: 0.03 K/UL (ref 0–0.04)
IMM GRANULOCYTES NFR BLD AUTO: 0.4 % (ref 0–0.5)
LIPASE SERPL-CCNC: 37 U/L (ref 4–60)
LYMPHOCYTES # BLD AUTO: 0.6 K/UL (ref 1–4.8)
LYMPHOCYTES NFR BLD: 7.7 % (ref 18–48)
MCH RBC QN AUTO: 27.6 PG (ref 27–31)
MCHC RBC AUTO-ENTMCNC: 34.4 G/DL (ref 32–36)
MCV RBC AUTO: 80 FL (ref 82–98)
MODE: ABNORMAL
MONOCYTES # BLD AUTO: 0.3 K/UL (ref 0.3–1)
MONOCYTES NFR BLD: 3.7 % (ref 4–15)
NEUTROPHILS # BLD AUTO: 6.7 K/UL (ref 1.8–7.7)
NEUTROPHILS NFR BLD: 87.4 % (ref 38–73)
NRBC BLD-RTO: 0 /100 WBC
PCO2 BLDA: 39.8 MMHG (ref 35–45)
PH SMN: 7.43 [PH] (ref 7.35–7.45)
PLATELET # BLD AUTO: 118 K/UL (ref 150–450)
PMV BLD AUTO: 9.6 FL (ref 9.2–12.9)
PO2 BLDA: 52 MMHG (ref 40–60)
POC BE: 2 MMOL/L
POC SATURATED O2: 87 % (ref 95–100)
POC TCO2: 28 MMOL/L (ref 24–29)
POCT GLUCOSE: >500 MG/DL (ref 70–110)
POCT GLUCOSE: >500 MG/DL (ref 70–110)
POTASSIUM SERPL-SCNC: 3.7 MMOL/L (ref 3.5–5.1)
PROT SERPL-MCNC: 8.3 G/DL (ref 6–8.4)
RBC # BLD AUTO: 2.79 M/UL (ref 4–5.4)
SAMPLE: ABNORMAL
SITE: ABNORMAL
SODIUM SERPL-SCNC: 134 MMOL/L (ref 136–145)
SP02: 94
WBC # BLD AUTO: 7.62 K/UL (ref 3.9–12.7)

## 2022-11-30 PROCEDURE — 94761 N-INVAS EAR/PLS OXIMETRY MLT: CPT

## 2022-11-30 PROCEDURE — 83690 ASSAY OF LIPASE: CPT | Performed by: PHYSICIAN ASSISTANT

## 2022-11-30 PROCEDURE — 93010 ELECTROCARDIOGRAM REPORT: CPT | Mod: ,,, | Performed by: INTERNAL MEDICINE

## 2022-11-30 PROCEDURE — 96375 TX/PRO/DX INJ NEW DRUG ADDON: CPT

## 2022-11-30 PROCEDURE — 25000003 PHARM REV CODE 250: Performed by: NURSE PRACTITIONER

## 2022-11-30 PROCEDURE — 93005 ELECTROCARDIOGRAM TRACING: CPT

## 2022-11-30 PROCEDURE — 96361 HYDRATE IV INFUSION ADD-ON: CPT

## 2022-11-30 PROCEDURE — 12000002 HC ACUTE/MED SURGE SEMI-PRIVATE ROOM

## 2022-11-30 PROCEDURE — 36415 COLL VENOUS BLD VENIPUNCTURE: CPT | Performed by: PHYSICIAN ASSISTANT

## 2022-11-30 PROCEDURE — 25000003 PHARM REV CODE 250: Performed by: EMERGENCY MEDICINE

## 2022-11-30 PROCEDURE — 96374 THER/PROPH/DIAG INJ IV PUSH: CPT

## 2022-11-30 PROCEDURE — 99900035 HC TECH TIME PER 15 MIN (STAT)

## 2022-11-30 PROCEDURE — 63600175 PHARM REV CODE 636 W HCPCS: Performed by: PHYSICIAN ASSISTANT

## 2022-11-30 PROCEDURE — 85025 COMPLETE CBC W/AUTO DIFF WBC: CPT | Performed by: PHYSICIAN ASSISTANT

## 2022-11-30 PROCEDURE — 93010 EKG 12-LEAD: ICD-10-PCS | Mod: ,,, | Performed by: INTERNAL MEDICINE

## 2022-11-30 PROCEDURE — 36430 TRANSFUSION BLD/BLD COMPNT: CPT

## 2022-11-30 PROCEDURE — 82803 BLOOD GASES ANY COMBINATION: CPT

## 2022-11-30 PROCEDURE — 25000003 PHARM REV CODE 250: Performed by: PHYSICIAN ASSISTANT

## 2022-11-30 PROCEDURE — 82962 GLUCOSE BLOOD TEST: CPT

## 2022-11-30 PROCEDURE — 99285 EMERGENCY DEPT VISIT HI MDM: CPT | Mod: 25

## 2022-11-30 PROCEDURE — G0378 HOSPITAL OBSERVATION PER HR: HCPCS

## 2022-11-30 PROCEDURE — 96372 THER/PROPH/DIAG INJ SC/IM: CPT | Performed by: PHYSICIAN ASSISTANT

## 2022-11-30 PROCEDURE — 80053 COMPREHEN METABOLIC PANEL: CPT | Performed by: PHYSICIAN ASSISTANT

## 2022-11-30 RX ORDER — IBUPROFEN 200 MG
16 TABLET ORAL
Status: DISCONTINUED | OUTPATIENT
Start: 2022-11-30 | End: 2022-12-07 | Stop reason: HOSPADM

## 2022-11-30 RX ORDER — ONDANSETRON 2 MG/ML
8 INJECTION INTRAMUSCULAR; INTRAVENOUS
Status: COMPLETED | OUTPATIENT
Start: 2022-11-30 | End: 2022-11-30

## 2022-11-30 RX ORDER — SODIUM,POTASSIUM PHOSPHATES 280-250MG
2 POWDER IN PACKET (EA) ORAL
Status: DISCONTINUED | OUTPATIENT
Start: 2022-11-30 | End: 2022-12-07 | Stop reason: HOSPADM

## 2022-11-30 RX ORDER — ISOSORBIDE MONONITRATE 30 MG/1
120 TABLET, EXTENDED RELEASE ORAL DAILY
Status: DISCONTINUED | OUTPATIENT
Start: 2022-12-01 | End: 2022-12-07 | Stop reason: HOSPADM

## 2022-11-30 RX ORDER — SIMETHICONE 80 MG
1 TABLET,CHEWABLE ORAL 4 TIMES DAILY PRN
Status: DISCONTINUED | OUTPATIENT
Start: 2022-11-30 | End: 2022-12-07 | Stop reason: HOSPADM

## 2022-11-30 RX ORDER — METHOCARBAMOL 500 MG/1
500 TABLET, FILM COATED ORAL 3 TIMES DAILY
Status: DISCONTINUED | OUTPATIENT
Start: 2022-12-01 | End: 2022-12-07 | Stop reason: HOSPADM

## 2022-11-30 RX ORDER — METOCLOPRAMIDE HYDROCHLORIDE 5 MG/ML
10 INJECTION INTRAMUSCULAR; INTRAVENOUS EVERY 6 HOURS PRN
Status: DISCONTINUED | OUTPATIENT
Start: 2022-11-30 | End: 2022-12-01

## 2022-11-30 RX ORDER — ACETAMINOPHEN 325 MG/1
650 TABLET ORAL EVERY 8 HOURS PRN
Status: DISCONTINUED | OUTPATIENT
Start: 2022-11-30 | End: 2022-12-01

## 2022-11-30 RX ORDER — AMLODIPINE BESYLATE 5 MG/1
10 TABLET ORAL DAILY
Status: DISCONTINUED | OUTPATIENT
Start: 2022-12-01 | End: 2022-12-07 | Stop reason: HOSPADM

## 2022-11-30 RX ORDER — ACETAMINOPHEN 325 MG/1
650 TABLET ORAL EVERY 4 HOURS PRN
Status: DISCONTINUED | OUTPATIENT
Start: 2022-11-30 | End: 2022-12-01

## 2022-11-30 RX ORDER — LANOLIN ALCOHOL/MO/W.PET/CERES
800 CREAM (GRAM) TOPICAL
Status: DISCONTINUED | OUTPATIENT
Start: 2022-11-30 | End: 2022-12-07 | Stop reason: HOSPADM

## 2022-11-30 RX ORDER — DROPERIDOL 2.5 MG/ML
1.25 INJECTION, SOLUTION INTRAMUSCULAR; INTRAVENOUS ONCE
Status: COMPLETED | OUTPATIENT
Start: 2022-11-30 | End: 2022-11-30

## 2022-11-30 RX ORDER — PANTOPRAZOLE SODIUM 40 MG/1
40 TABLET, DELAYED RELEASE ORAL DAILY
Status: DISCONTINUED | OUTPATIENT
Start: 2022-12-01 | End: 2022-12-07 | Stop reason: HOSPADM

## 2022-11-30 RX ORDER — GLUCAGON 1 MG
1 KIT INJECTION
Status: DISCONTINUED | OUTPATIENT
Start: 2022-11-30 | End: 2022-12-07 | Stop reason: HOSPADM

## 2022-11-30 RX ORDER — IBUPROFEN 200 MG
24 TABLET ORAL
Status: DISCONTINUED | OUTPATIENT
Start: 2022-11-30 | End: 2022-12-07 | Stop reason: HOSPADM

## 2022-11-30 RX ORDER — FAMOTIDINE 10 MG/ML
20 INJECTION INTRAVENOUS
Status: COMPLETED | OUTPATIENT
Start: 2022-11-30 | End: 2022-11-30

## 2022-11-30 RX ORDER — INSULIN GLARGINE 100 [IU]/ML
5 INJECTION, SOLUTION SUBCUTANEOUS EVERY MORNING
Status: ON HOLD | COMMUNITY
Start: 2022-05-25 | End: 2022-12-07 | Stop reason: HOSPADM

## 2022-11-30 RX ORDER — AMOXICILLIN 250 MG
1 CAPSULE ORAL 2 TIMES DAILY
Status: DISCONTINUED | OUTPATIENT
Start: 2022-11-30 | End: 2022-12-07 | Stop reason: HOSPADM

## 2022-11-30 RX ORDER — LEVETIRACETAM 250 MG/1
500 TABLET ORAL 2 TIMES DAILY
Status: DISCONTINUED | OUTPATIENT
Start: 2022-11-30 | End: 2022-12-07 | Stop reason: HOSPADM

## 2022-11-30 RX ORDER — MAG HYDROX/ALUMINUM HYD/SIMETH 200-200-20
30 SUSPENSION, ORAL (FINAL DOSE FORM) ORAL 4 TIMES DAILY PRN
Status: DISCONTINUED | OUTPATIENT
Start: 2022-11-30 | End: 2022-12-07 | Stop reason: HOSPADM

## 2022-11-30 RX ORDER — MORPHINE SULFATE 4 MG/ML
4 INJECTION, SOLUTION INTRAMUSCULAR; INTRAVENOUS
Status: COMPLETED | OUTPATIENT
Start: 2022-11-30 | End: 2022-11-30

## 2022-11-30 RX ORDER — TALC
9 POWDER (GRAM) TOPICAL NIGHTLY PRN
Status: DISCONTINUED | OUTPATIENT
Start: 2022-11-30 | End: 2022-12-07 | Stop reason: HOSPADM

## 2022-11-30 RX ORDER — GABAPENTIN 100 MG/1
100 CAPSULE ORAL 2 TIMES DAILY
Status: DISCONTINUED | OUTPATIENT
Start: 2022-11-30 | End: 2022-12-07 | Stop reason: HOSPADM

## 2022-11-30 RX ORDER — NALOXONE HCL 0.4 MG/ML
0.02 VIAL (ML) INJECTION
Status: DISCONTINUED | OUTPATIENT
Start: 2022-11-30 | End: 2022-12-07 | Stop reason: HOSPADM

## 2022-11-30 RX ORDER — CARVEDILOL 25 MG/1
25 TABLET ORAL 2 TIMES DAILY
Status: DISCONTINUED | OUTPATIENT
Start: 2022-11-30 | End: 2022-12-07 | Stop reason: HOSPADM

## 2022-11-30 RX ORDER — ONDANSETRON 2 MG/ML
8 INJECTION INTRAMUSCULAR; INTRAVENOUS EVERY 6 HOURS PRN
Status: DISCONTINUED | OUTPATIENT
Start: 2022-11-30 | End: 2022-12-07 | Stop reason: HOSPADM

## 2022-11-30 RX ORDER — DIPHENHYDRAMINE HYDROCHLORIDE 50 MG/ML
12.5 INJECTION INTRAMUSCULAR; INTRAVENOUS
Status: COMPLETED | OUTPATIENT
Start: 2022-11-30 | End: 2022-11-30

## 2022-11-30 RX ORDER — INSULIN ASPART 100 [IU]/ML
1-10 INJECTION, SOLUTION INTRAVENOUS; SUBCUTANEOUS
Status: DISCONTINUED | OUTPATIENT
Start: 2022-11-30 | End: 2022-12-07 | Stop reason: HOSPADM

## 2022-11-30 RX ORDER — FLUOXETINE HYDROCHLORIDE 20 MG/1
20 CAPSULE ORAL DAILY
Status: DISCONTINUED | OUTPATIENT
Start: 2022-12-01 | End: 2022-12-07 | Stop reason: HOSPADM

## 2022-11-30 RX ORDER — SODIUM CHLORIDE 0.9 % (FLUSH) 0.9 %
3 SYRINGE (ML) INJECTION EVERY 12 HOURS PRN
Status: DISCONTINUED | OUTPATIENT
Start: 2022-11-30 | End: 2022-12-07 | Stop reason: HOSPADM

## 2022-11-30 RX ADMIN — SODIUM CHLORIDE 500 ML: 0.9 INJECTION, SOLUTION INTRAVENOUS at 07:11

## 2022-11-30 RX ADMIN — INSULIN HUMAN 5 UNITS: 100 INJECTION, SOLUTION PARENTERAL at 08:11

## 2022-11-30 RX ADMIN — DIPHENHYDRAMINE HYDROCHLORIDE 12.5 MG: 50 INJECTION INTRAMUSCULAR; INTRAVENOUS at 07:11

## 2022-11-30 RX ADMIN — ONDANSETRON 8 MG: 2 INJECTION INTRAMUSCULAR; INTRAVENOUS at 06:11

## 2022-11-30 RX ADMIN — DROPERIDOL 1.25 MG: 2.5 INJECTION, SOLUTION INTRAMUSCULAR; INTRAVENOUS at 07:11

## 2022-11-30 RX ADMIN — CARVEDILOL 25 MG: 25 TABLET, FILM COATED ORAL at 11:11

## 2022-11-30 RX ADMIN — SENNOSIDES AND DOCUSATE SODIUM 1 TABLET: 50; 8.6 TABLET ORAL at 11:11

## 2022-11-30 RX ADMIN — FAMOTIDINE 20 MG: 10 INJECTION, SOLUTION INTRAVENOUS at 06:11

## 2022-11-30 RX ADMIN — LEVETIRACETAM 500 MG: 250 TABLET, FILM COATED ORAL at 11:11

## 2022-11-30 RX ADMIN — APIXABAN 5 MG: 2.5 TABLET, FILM COATED ORAL at 11:11

## 2022-11-30 RX ADMIN — GABAPENTIN 100 MG: 100 CAPSULE ORAL at 11:11

## 2022-11-30 RX ADMIN — MORPHINE SULFATE 4 MG: 4 INJECTION INTRAVENOUS at 06:11

## 2022-12-01 PROBLEM — N18.6 ANEMIA DUE TO CHRONIC KIDNEY DISEASE, ON CHRONIC DIALYSIS: Status: ACTIVE | Noted: 2022-10-26

## 2022-12-01 PROBLEM — Z99.2 ANEMIA DUE TO CHRONIC KIDNEY DISEASE, ON CHRONIC DIALYSIS: Status: ACTIVE | Noted: 2022-10-26

## 2022-12-01 PROBLEM — D63.1 ANEMIA DUE TO CHRONIC KIDNEY DISEASE, ON CHRONIC DIALYSIS: Status: ACTIVE | Noted: 2022-10-26

## 2022-12-01 LAB
ABO + RH BLD: NORMAL
ALBUMIN SERPL BCP-MCNC: 2.8 G/DL (ref 3.5–5.2)
ALP SERPL-CCNC: 354 U/L (ref 55–135)
ALT SERPL W/O P-5'-P-CCNC: 89 U/L (ref 10–44)
ANION GAP SERPL CALC-SCNC: 14 MMOL/L (ref 8–16)
AST SERPL-CCNC: 37 U/L (ref 10–40)
BASOPHILS # BLD AUTO: 0.03 K/UL (ref 0–0.2)
BASOPHILS NFR BLD: 0.6 % (ref 0–1.9)
BILIRUB SERPL-MCNC: 1.6 MG/DL (ref 0.1–1)
BLD GP AB SCN CELLS X3 SERPL QL: NORMAL
BLD PROD TYP BPU: NORMAL
BLD PROD TYP BPU: NORMAL
BLOOD UNIT EXPIRATION DATE: NORMAL
BLOOD UNIT EXPIRATION DATE: NORMAL
BLOOD UNIT TYPE CODE: 6200
BLOOD UNIT TYPE CODE: 6200
BLOOD UNIT TYPE: NORMAL
BLOOD UNIT TYPE: NORMAL
BUN SERPL-MCNC: 40 MG/DL (ref 6–20)
CALCIUM SERPL-MCNC: 8.8 MG/DL (ref 8.7–10.5)
CHLORIDE SERPL-SCNC: 96 MMOL/L (ref 95–110)
CO2 SERPL-SCNC: 26 MMOL/L (ref 23–29)
CODING SYSTEM: NORMAL
CODING SYSTEM: NORMAL
CREAT SERPL-MCNC: 4.8 MG/DL (ref 0.5–1.4)
DIFFERENTIAL METHOD: ABNORMAL
DISPENSE STATUS: NORMAL
DISPENSE STATUS: NORMAL
EOSINOPHIL # BLD AUTO: 0 K/UL (ref 0–0.5)
EOSINOPHIL NFR BLD: 0.6 % (ref 0–8)
ERYTHROCYTE [DISTWIDTH] IN BLOOD BY AUTOMATED COUNT: 14.6 % (ref 11.5–14.5)
EST. GFR  (NO RACE VARIABLE): 12 ML/MIN/1.73 M^2
GLUCOSE SERPL-MCNC: 305 MG/DL (ref 70–110)
HCT VFR BLD AUTO: 18.4 % (ref 37–48.5)
HGB BLD-MCNC: 6.3 G/DL (ref 12–16)
IMM GRANULOCYTES # BLD AUTO: 0.02 K/UL (ref 0–0.04)
IMM GRANULOCYTES NFR BLD AUTO: 0.4 % (ref 0–0.5)
LYMPHOCYTES # BLD AUTO: 0.7 K/UL (ref 1–4.8)
LYMPHOCYTES NFR BLD: 13.3 % (ref 18–48)
MAGNESIUM SERPL-MCNC: 1.8 MG/DL (ref 1.6–2.6)
MCH RBC QN AUTO: 27.5 PG (ref 27–31)
MCHC RBC AUTO-ENTMCNC: 34.2 G/DL (ref 32–36)
MCV RBC AUTO: 80 FL (ref 82–98)
MONOCYTES # BLD AUTO: 0.5 K/UL (ref 0.3–1)
MONOCYTES NFR BLD: 9.2 % (ref 4–15)
NEUTROPHILS # BLD AUTO: 3.7 K/UL (ref 1.8–7.7)
NEUTROPHILS NFR BLD: 75.9 % (ref 38–73)
NRBC BLD-RTO: 0 /100 WBC
NUM UNITS TRANS PACKED RBC: NORMAL
NUM UNITS TRANS PACKED RBC: NORMAL
PHOSPHATE SERPL-MCNC: 5.2 MG/DL (ref 2.7–4.5)
PLATELET # BLD AUTO: 98 K/UL (ref 150–450)
PMV BLD AUTO: 9.7 FL (ref 9.2–12.9)
POCT GLUCOSE: 166 MG/DL (ref 70–110)
POCT GLUCOSE: 180 MG/DL (ref 70–110)
POCT GLUCOSE: 235 MG/DL (ref 70–110)
POCT GLUCOSE: 306 MG/DL (ref 70–110)
POCT GLUCOSE: 348 MG/DL (ref 70–110)
POCT GLUCOSE: 374 MG/DL (ref 70–110)
POCT GLUCOSE: 381 MG/DL (ref 70–110)
POCT GLUCOSE: 473 MG/DL (ref 70–110)
POTASSIUM SERPL-SCNC: 3.6 MMOL/L (ref 3.5–5.1)
PROT SERPL-MCNC: 6.2 G/DL (ref 6–8.4)
RBC # BLD AUTO: 2.29 M/UL (ref 4–5.4)
SODIUM SERPL-SCNC: 136 MMOL/L (ref 136–145)
WBC # BLD AUTO: 4.88 K/UL (ref 3.9–12.7)

## 2022-12-01 PROCEDURE — 86850 RBC ANTIBODY SCREEN: CPT | Performed by: NURSE PRACTITIONER

## 2022-12-01 PROCEDURE — 96375 TX/PRO/DX INJ NEW DRUG ADDON: CPT

## 2022-12-01 PROCEDURE — 63600175 PHARM REV CODE 636 W HCPCS: Performed by: INTERNAL MEDICINE

## 2022-12-01 PROCEDURE — 25000003 PHARM REV CODE 250: Performed by: NURSE PRACTITIONER

## 2022-12-01 PROCEDURE — 36415 COLL VENOUS BLD VENIPUNCTURE: CPT | Performed by: NURSE PRACTITIONER

## 2022-12-01 PROCEDURE — G0378 HOSPITAL OBSERVATION PER HR: HCPCS

## 2022-12-01 PROCEDURE — 63600175 PHARM REV CODE 636 W HCPCS: Performed by: NURSE PRACTITIONER

## 2022-12-01 PROCEDURE — P9016 RBC LEUKOCYTES REDUCED: HCPCS | Performed by: INTERNAL MEDICINE

## 2022-12-01 PROCEDURE — 96361 HYDRATE IV INFUSION ADD-ON: CPT

## 2022-12-01 PROCEDURE — 63600175 PHARM REV CODE 636 W HCPCS: Performed by: HOSPITALIST

## 2022-12-01 PROCEDURE — 86920 COMPATIBILITY TEST SPIN: CPT | Performed by: INTERNAL MEDICINE

## 2022-12-01 PROCEDURE — 80100014 HC HEMODIALYSIS 1:1

## 2022-12-01 PROCEDURE — 25000003 PHARM REV CODE 250: Performed by: INTERNAL MEDICINE

## 2022-12-01 PROCEDURE — 80053 COMPREHEN METABOLIC PANEL: CPT | Performed by: NURSE PRACTITIONER

## 2022-12-01 PROCEDURE — 12000002 HC ACUTE/MED SURGE SEMI-PRIVATE ROOM

## 2022-12-01 PROCEDURE — 82962 GLUCOSE BLOOD TEST: CPT

## 2022-12-01 PROCEDURE — 84100 ASSAY OF PHOSPHORUS: CPT | Performed by: NURSE PRACTITIONER

## 2022-12-01 PROCEDURE — 96376 TX/PRO/DX INJ SAME DRUG ADON: CPT

## 2022-12-01 PROCEDURE — 27000221 HC OXYGEN, UP TO 24 HOURS

## 2022-12-01 PROCEDURE — 96372 THER/PROPH/DIAG INJ SC/IM: CPT | Performed by: NURSE PRACTITIONER

## 2022-12-01 PROCEDURE — 94761 N-INVAS EAR/PLS OXIMETRY MLT: CPT

## 2022-12-01 PROCEDURE — 85025 COMPLETE CBC W/AUTO DIFF WBC: CPT | Performed by: NURSE PRACTITIONER

## 2022-12-01 PROCEDURE — 83735 ASSAY OF MAGNESIUM: CPT | Performed by: NURSE PRACTITIONER

## 2022-12-01 RX ORDER — MUPIROCIN 20 MG/G
OINTMENT TOPICAL 2 TIMES DAILY
Status: DISPENSED | OUTPATIENT
Start: 2022-12-01 | End: 2022-12-06

## 2022-12-01 RX ORDER — PROCHLORPERAZINE EDISYLATE 5 MG/ML
10 INJECTION INTRAMUSCULAR; INTRAVENOUS EVERY 6 HOURS PRN
Status: DISCONTINUED | OUTPATIENT
Start: 2022-12-01 | End: 2022-12-07 | Stop reason: HOSPADM

## 2022-12-01 RX ORDER — HYDROMORPHONE HYDROCHLORIDE 2 MG/ML
0.5 INJECTION, SOLUTION INTRAMUSCULAR; INTRAVENOUS; SUBCUTANEOUS EVERY 6 HOURS PRN
Status: DISCONTINUED | OUTPATIENT
Start: 2022-12-01 | End: 2022-12-05

## 2022-12-01 RX ORDER — HYDROCODONE BITARTRATE AND ACETAMINOPHEN 500; 5 MG/1; MG/1
TABLET ORAL
Status: DISCONTINUED | OUTPATIENT
Start: 2022-12-01 | End: 2022-12-07 | Stop reason: HOSPADM

## 2022-12-01 RX ORDER — ACETAMINOPHEN 325 MG/1
650 TABLET ORAL EVERY 4 HOURS PRN
Status: DISCONTINUED | OUTPATIENT
Start: 2022-12-01 | End: 2022-12-07 | Stop reason: HOSPADM

## 2022-12-01 RX ORDER — SODIUM CHLORIDE 9 MG/ML
INJECTION, SOLUTION INTRAVENOUS ONCE
Status: DISCONTINUED | OUTPATIENT
Start: 2022-12-01 | End: 2022-12-03

## 2022-12-01 RX ADMIN — GABAPENTIN 100 MG: 100 CAPSULE ORAL at 09:12

## 2022-12-01 RX ADMIN — INSULIN ASPART 4 UNITS: 100 INJECTION, SOLUTION INTRAVENOUS; SUBCUTANEOUS at 04:12

## 2022-12-01 RX ADMIN — ONDANSETRON 8 MG: 2 INJECTION INTRAMUSCULAR; INTRAVENOUS at 08:12

## 2022-12-01 RX ADMIN — CARVEDILOL 25 MG: 25 TABLET, FILM COATED ORAL at 08:12

## 2022-12-01 RX ADMIN — SENNOSIDES AND DOCUSATE SODIUM 1 TABLET: 50; 8.6 TABLET ORAL at 08:12

## 2022-12-01 RX ADMIN — INSULIN HUMAN 5 UNITS: 100 INJECTION, SOLUTION PARENTERAL at 01:12

## 2022-12-01 RX ADMIN — METHOCARBAMOL 500 MG: 500 TABLET ORAL at 08:12

## 2022-12-01 RX ADMIN — PROCHLORPERAZINE EDISYLATE 10 MG: 5 INJECTION INTRAMUSCULAR; INTRAVENOUS at 01:12

## 2022-12-01 RX ADMIN — METHOCARBAMOL 500 MG: 500 TABLET ORAL at 09:12

## 2022-12-01 RX ADMIN — CARVEDILOL 25 MG: 25 TABLET, FILM COATED ORAL at 09:12

## 2022-12-01 RX ADMIN — LEVETIRACETAM 500 MG: 250 TABLET, FILM COATED ORAL at 09:12

## 2022-12-01 RX ADMIN — INSULIN ASPART 8 UNITS: 100 INJECTION, SOLUTION INTRAVENOUS; SUBCUTANEOUS at 06:12

## 2022-12-01 RX ADMIN — GABAPENTIN 100 MG: 100 CAPSULE ORAL at 08:12

## 2022-12-01 RX ADMIN — METHOCARBAMOL 500 MG: 500 TABLET ORAL at 02:12

## 2022-12-01 RX ADMIN — AMLODIPINE BESYLATE 10 MG: 5 TABLET ORAL at 09:12

## 2022-12-01 RX ADMIN — EPOETIN ALFA-EPBX 10000 UNITS: 10000 INJECTION, SOLUTION INTRAVENOUS; SUBCUTANEOUS at 12:12

## 2022-12-01 RX ADMIN — SODIUM CHLORIDE 250 ML: 0.9 INJECTION, SOLUTION INTRAVENOUS at 10:12

## 2022-12-01 RX ADMIN — ONDANSETRON 8 MG: 2 INJECTION INTRAMUSCULAR; INTRAVENOUS at 01:12

## 2022-12-01 RX ADMIN — INSULIN DETEMIR 5 UNITS: 100 INJECTION, SOLUTION SUBCUTANEOUS at 09:12

## 2022-12-01 RX ADMIN — PANTOPRAZOLE SODIUM 40 MG: 40 TABLET, DELAYED RELEASE ORAL at 09:12

## 2022-12-01 RX ADMIN — ISOSORBIDE MONONITRATE 120 MG: 60 TABLET, EXTENDED RELEASE ORAL at 09:12

## 2022-12-01 RX ADMIN — MUPIROCIN: 20 OINTMENT TOPICAL at 09:12

## 2022-12-01 RX ADMIN — SODIUM CHLORIDE: 0.9 INJECTION, SOLUTION INTRAVENOUS at 11:12

## 2022-12-01 RX ADMIN — HYDROMORPHONE HYDROCHLORIDE 0.5 MG: 2 INJECTION, SOLUTION INTRAMUSCULAR; INTRAVENOUS; SUBCUTANEOUS at 04:12

## 2022-12-01 RX ADMIN — MUPIROCIN: 20 OINTMENT TOPICAL at 08:12

## 2022-12-01 RX ADMIN — ACETAMINOPHEN 650 MG: 325 TABLET ORAL at 01:12

## 2022-12-01 RX ADMIN — FLUOXETINE 20 MG: 20 CAPSULE ORAL at 09:12

## 2022-12-01 RX ADMIN — APIXABAN 5 MG: 2.5 TABLET, FILM COATED ORAL at 09:12

## 2022-12-01 RX ADMIN — APIXABAN 5 MG: 2.5 TABLET, FILM COATED ORAL at 08:12

## 2022-12-01 RX ADMIN — SENNOSIDES AND DOCUSATE SODIUM 1 TABLET: 50; 8.6 TABLET ORAL at 09:12

## 2022-12-01 RX ADMIN — HYDROMORPHONE HYDROCHLORIDE 0.5 MG: 2 INJECTION, SOLUTION INTRAMUSCULAR; INTRAVENOUS; SUBCUTANEOUS at 10:12

## 2022-12-01 RX ADMIN — LEVETIRACETAM 500 MG: 250 TABLET, FILM COATED ORAL at 08:12

## 2022-12-01 RX ADMIN — ALUMINUM HYDROXIDE, MAGNESIUM HYDROXIDE, AND SIMETHICONE 30 ML: 200; 200; 20 SUSPENSION ORAL at 01:12

## 2022-12-01 NOTE — ED PROVIDER NOTES
Encounter Date: 2022    SCRIBE #1 NOTE: IShelbi, nini scribing for, and in the presence of,  Thelma Mata PA-C.     History     Chief Complaint   Patient presents with    Abdominal Pain     Started today with NVD     Time seen by provider: 6:27 PM on 2022    Tabby Howard is a 32 y.o. female with a PMHx of DM II, SVT, GERD, Sickle cell trait, gastroparesis, and CKD who presents to the ED for evaluation of abdominal pain that onset today with associated N/V/D.  Patient has frequent ED visits with the same Sx that often lead to admittance.  She is a Tuesday, Thursday, Saturday [facility] dialysis patient with last session yesterday.  Patient is c/o CP and abdominal distension as she notes her dialysis catheter is over her R-sided chest wall.  No other symptoms reported at this time.  PSHx includes EGD, , placement of dual lumen vascular catheter (2022) and colonoscopy.        The history is provided by the patient.   Review of patient's allergies indicates:   Allergen Reactions    Penicillins Hives     Past Medical History:   Diagnosis Date    CKD (chronic kidney disease), stage IV 2022    Diabetes mellitus due to underlying condition with unspecified complications 2022    Gastroparesis 2022    Heart failure with preserved ejection fraction 2022    EF 55% on 3/22    History of gastroesophageal reflux (GERD)     History of supraventricular tachycardia     Hyperkalemia 2022    Hypertensive emergency 2022    Sickle cell trait 2022    Type 2 diabetes mellitus      Past Surgical History:   Procedure Laterality Date     SECTION      x 3    COLONOSCOPY      COLONOSCOPY N/A 2022    Procedure: COLONOSCOPY;  Surgeon: Jagdeep Cedeno MD;  Location: [facility];  Service: Endoscopy;  Laterality: N/A;    ESOPHAGOGASTRODUODENOSCOPY N/A 10/18/2019    Procedure: ESOPHAGOGASTRODUODENOSCOPY (EGD);  Surgeon: Gianluca Mendez MD;  Location: [facility];   Service: Endoscopy;  Laterality: N/A;    ESOPHAGOGASTRODUODENOSCOPY N/A 08/24/2022    Procedure: EGD (ESOPHAGOGASTRODUODENOSCOPY);  Surgeon: Micky Paredes III, MD;  Location: [facility];  Service: Endoscopy;  Laterality: N/A;    LAPAROSCOPIC CHOLECYSTECTOMY N/A 07/30/2022    Procedure: CHOLECYSTECTOMY, LAPAROSCOPIC;  Surgeon: Grey Perez MD;  Location: [facility];  Service: General;  Laterality: N/A;    PLACEMENT OF DUAL-LUMEN VASCULAR CATHETER Left 07/12/2022    Procedure: INSERTION-CATHETER-JOSEPH;  Surgeon: Dionte Gan MD;  Location: [facility] OR;  Service: General;  Laterality: Left;    PLACEMENT OF DUAL-LUMEN VASCULAR CATHETER Right 07/26/2022    Procedure: INSERTION-CATHETER-Hemosplit;  Surgeon: Dionte Gan MD;  Location: [facility];  Service: General;  Laterality: Right;     Family History   Problem Relation Age of Onset    Diabetes Mother     Diabetes Father      Social History     Tobacco Use    Smoking status: Never    Smokeless tobacco: Never   Substance Use Topics    Alcohol use: Not Currently    Drug use: No     Review of Systems   Constitutional:  Negative for chills and fever.   Respiratory:  Negative for cough, chest tightness, shortness of breath and wheezing.    Cardiovascular:  Positive for chest pain. Negative for palpitations.   Gastrointestinal:  Positive for abdominal distention, abdominal pain, diarrhea, nausea and vomiting.   Genitourinary:  Negative for dysuria and hematuria.   Musculoskeletal:  Negative for arthralgias, back pain, joint swelling, myalgias, neck pain and neck stiffness.   Skin:  Negative for color change, pallor, rash and wound.   Neurological:  Negative for dizziness, syncope, weakness, light-headedness, numbness and headaches.   Hematological:  Does not bruise/bleed easily.   Psychiatric/Behavioral:  The patient is not nervous/anxious.    All other systems reviewed and are negative.    Physical Exam     Initial Vitals [11/30/22 1810]   BP Pulse Resp Temp SpO2    (!) 208/111 94 18 97.7 °F (36.5 °C) 100 %      MAP       --         Physical Exam    Nursing note and vitals reviewed.  Constitutional: She appears well-developed and well-nourished. She is not diaphoretic. No distress.   HENT:   Head: Normocephalic and atraumatic.   Eyes: Conjunctivae are normal.   Neck: Neck supple.   Normal range of motion.  Cardiovascular:  Normal rate, regular rhythm, normal heart sounds and intact distal pulses.     Exam reveals no gallop and no friction rub.       No murmur heard.  Pulmonary/Chest: Breath sounds normal. No respiratory distress. She has no wheezes. She has no rhonchi. She has no rales.   Equal, bilateral breath sounds noted without wheezing.  Intact dialysis catheter to right anterior chest wall.    Abdominal: Abdomen is soft. She exhibits no distension and no mass. There is abdominal tenderness.   TTP noted to epigastric region.  Abdomen soft.     Musculoskeletal:         General: No tenderness or edema. Normal range of motion.      Cervical back: Normal range of motion and neck supple.     Neurological: She is alert and oriented to person, place, and time. She has normal strength. No sensory deficit.   Skin: Skin is warm and dry. No rash and no abscess noted. No erythema.   Psychiatric:   Pt is anxious and rolling around stretcher.        ED Course   Procedures  Labs Reviewed   CBC W/ AUTO DIFFERENTIAL - Abnormal; Notable for the following components:       Result Value    RBC 2.79 (*)     Hemoglobin 7.7 (*)     Hematocrit 22.4 (*)     MCV 80 (*)     Platelets 118 (*)     Lymph # 0.6 (*)     Gran % 87.4 (*)     Lymph % 7.7 (*)     Mono % 3.7 (*)     All other components within normal limits   COMPREHENSIVE METABOLIC PANEL - Abnormal; Notable for the following components:    Sodium 134 (*)     Chloride 92 (*)     Glucose 587 (*)     BUN 35 (*)     Creatinine 4.5 (*)     Total Bilirubin 2.8 (*)     Alkaline Phosphatase 477 (*)     AST 59 (*)      (*)     Anion Gap 19  (*)     eGFR 13 (*)     All other components within normal limits    Narrative:     Glucose    critical result(s) called and verbal readback obtained   from Haylie Dasilva RN.  by ARACELI 11/30/2022 19:24   COMPREHENSIVE METABOLIC PANEL - Abnormal; Notable for the following components:    Glucose 305 (*)     BUN 40 (*)     Creatinine 4.8 (*)     Albumin 2.8 (*)     Total Bilirubin 1.6 (*)     Alkaline Phosphatase 354 (*)     ALT 89 (*)     eGFR 12 (*)     All other components within normal limits   PHOSPHORUS - Abnormal; Notable for the following components:    Phosphorus 5.2 (*)     All other components within normal limits   CBC W/ AUTO DIFFERENTIAL - Abnormal; Notable for the following components:    RBC 2.29 (*)     Hemoglobin 6.3 (*)     Hematocrit 18.4 (*)     MCV 80 (*)     RDW 14.6 (*)     Platelets 98 (*)     Lymph # 0.7 (*)     Gran % 75.9 (*)     Lymph % 13.3 (*)     All other components within normal limits    Narrative:     H&H  critical result(s) called and verbal readback obtained from   Maty Freire RN. by JBD 12/01/2022 06:00   ISTAT PROCEDURE - Abnormal; Notable for the following components:    POC SATURATED O2 87 (*)     All other components within normal limits   POCT GLUCOSE - Abnormal; Notable for the following components:    POCT Glucose >500 (*)     All other components within normal limits   POCT GLUCOSE - Abnormal; Notable for the following components:    POCT Glucose >500 (*)     All other components within normal limits   POCT GLUCOSE - Abnormal; Notable for the following components:    POCT Glucose 374 (*)     All other components within normal limits   POCT GLUCOSE - Abnormal; Notable for the following components:    POCT Glucose 381 (*)     All other components within normal limits   POCT GLUCOSE - Abnormal; Notable for the following components:    POCT Glucose 348 (*)     All other components within normal limits   POCT GLUCOSE - Abnormal; Notable for the following components:     POCT Glucose 306 (*)     All other components within normal limits   POCT GLUCOSE - Abnormal; Notable for the following components:    POCT Glucose 473 (*)     All other components within normal limits   POCT GLUCOSE - Abnormal; Notable for the following components:    POCT Glucose 180 (*)     All other components within normal limits   POCT GLUCOSE - Abnormal; Notable for the following components:    POCT Glucose 235 (*)     All other components within normal limits   LIPASE   MAGNESIUM   POCT GLUCOSE, HAND-HELD DEVICE   POCT GLUCOSE, HAND-HELD DEVICE   POCT GLUCOSE, HAND-HELD DEVICE   TYPE & SCREEN   POCT GLUCOSE MONITORING CONTINUOUS   PREPARE RBC SOFT   PREPARE RBC SOFT        ECG Results              EKG 12-lead (In process)  Result time 12/01/22 08:36:38      In process by Interface, Lab In [facility] (12/01/22 08:36:38)                   Narrative:    Test Reason : R10.13,    Vent. Rate : 090 BPM     Atrial Rate : 090 BPM     P-R Int : 184 ms          QRS Dur : 090 ms      QT Int : 402 ms       P-R-T Axes : 045 -11 000 degrees     QTc Int : 491 ms    Normal sinus rhythm  Possible Left atrial enlargement  LVH  Prolonged QT  Abnormal ECG    Referred By: [email or web address]   SELF           Confirmed By:                                   Imaging Results    None          Medications   amLODIPine tablet 10 mg (10 mg Oral Given 12/4/22 0806)   carvediloL tablet 25 mg (25 mg Oral Given 12/4/22 2008)   FLUoxetine capsule 20 mg (20 mg Oral Given 12/4/22 0806)   gabapentin capsule 100 mg (100 mg Oral Given 12/4/22 2008)   isosorbide mononitrate 24 hr tablet 120 mg (120 mg Oral Given 12/4/22 0806)   levETIRAcetam tablet 500 mg (500 mg Oral Given 12/4/22 2008)   methocarbamoL tablet 500 mg (500 mg Oral Given 12/4/22 2008)   pantoprazole EC tablet 40 mg (40 mg Oral Given 12/4/22 0805)   sodium chloride 0.9% flush 3 mL (has no administration in time range)   melatonin tablet 9 mg (has no administration in time range)   ondansetron  injection 8 mg (8 mg Intravenous Given 12/4/22 1621)   simethicone chewable tablet 80 mg (80 mg Oral Given 12/4/22 1618)   aluminum-magnesium hydroxide-simethicone 200-200-20 mg/5 mL suspension 30 mL (30 mLs Oral Given 12/2/22 0856)   naloxone 0.4 mg/mL injection 0.02 mg (has no administration in time range)   potassium bicarbonate disintegrating tablet 50 mEq (has no administration in time range)   potassium bicarbonate disintegrating tablet 35 mEq (has no administration in time range)   potassium bicarbonate disintegrating tablet 60 mEq (has no administration in time range)   magnesium oxide tablet 800 mg (has no administration in time range)   magnesium oxide tablet 800 mg (has no administration in time range)   potassium, sodium phosphates 280-160-250 mg packet 2 packet (has no administration in time range)   potassium, sodium phosphates 280-160-250 mg packet 2 packet (has no administration in time range)   potassium, sodium phosphates 280-160-250 mg packet 2 packet (has no administration in time range)   glucose chewable tablet 16 g (16 g Oral Given 12/4/22 1635)   glucose chewable tablet 24 g (has no administration in time range)   glucagon (human recombinant) injection 1 mg (has no administration in time range)   dextrose 10% bolus 125 mL (has no administration in time range)   dextrose 10% bolus 250 mL (has no administration in time range)   senna-docusate 8.6-50 mg per tablet 1 tablet (1 tablet Oral Given 12/4/22 0805)   insulin aspart U-100 pen 1-10 Units (4 Units Subcutaneous Given 12/4/22 0806)   prochlorperazine injection Soln 10 mg (10 mg Intravenous Given 12/4/22 2008)   0.9%  NaCl infusion (for blood administration) ( Intravenous Stopped 12/1/22 1428)   mupirocin 2 % ointment ( Nasal Given 12/4/22 0805)   sodium chloride 0.9% bolus 250 mL (0 mLs Intravenous Stopped 12/1/22 1428)   epoetin teresa-epbx injection 10,000 Units (10,000 Units Subcutaneous Given 12/3/22 1715)   acetaminophen tablet 650 mg  (has no administration in time range)   HYDROmorphone (PF) injection 0.5 mg (0.5 mg Intravenous Given 12/4/22 1742)   apixaban tablet 2.5 mg (2.5 mg Oral Given 12/4/22 2008)   insulin detemir U-100 pen 8 Units (8 Units Subcutaneous Given 12/4/22 0807)   dicyclomine capsule 10 mg (10 mg Oral Given 12/4/22 2008)   morphine injection 4 mg (4 mg Intravenous Given 11/30/22 1853)   ondansetron injection 8 mg (8 mg Intravenous Given 11/30/22 1852)   famotidine (PF) injection 20 mg (20 mg Intravenous Given 11/30/22 1853)   sodium chloride 0.9% bolus 500 mL (0 mLs Intravenous Stopped 11/30/22 2021)   droperidoL injection 1.25 mg (1.25 mg Intravenous Given 11/30/22 1944)   diphenhydrAMINE injection 12.5 mg (12.5 mg Intravenous Given 11/30/22 1945)   insulin regular injection 5 Units 0.05 mL (5 Units Subcutaneous Given 11/30/22 2014)   insulin regular injection 5 Units 0.05 mL (5 Units Intravenous Given 12/1/22 0115)   aluminum-magnesium hydroxide-simethicone 200-200-20 mg/5 mL suspension 30 mL (30 mLs Oral Given 12/4/22 1316)     And   LIDOcaine HCl 2% oral solution 15 mL (15 mLs Oral Given 12/4/22 1400)     Medical Decision Making:   History:   Old Medical Records: I decided to obtain old medical records.  Differential Diagnosis:   Gastroparesis   Acute abdomen   Pancreatitis   Gastritis   Independently Interpreted Test(s):   I have ordered and independently interpreted EKG Reading(s) - see prior notes  Clinical Tests:   Lab Tests: Ordered and Reviewed  Medical Tests: Ordered and Reviewed  ED Management:  Blood glucose is elevated into >500, but no evidence for acidosis on VBG.  Pt has required multiple doses of antiemetics here in the ED.  H&H is at baseline, Lipase normal.  Pt had dialysis yesterday.  LFTs elevated, similar to baseline and no focal abdominal tenderness.  She did have some mild hypoxia after the most recent doses of medication while sleeping, hunched over on stretcher.  She is given 2L O2 nasal cannula as  needed.  Hypoxia has improved. She will benefit from admission to the hospital for intractable nausea and vomiting.  She did tolerate a small bolus of IV fluids and subQ insulin.  She voices understanding and is agreeable to the plan. Hospital medicine agrees to admission.         Scribe Attestation:   Scribe #1: I performed the above scribed service and the documentation accurately describes the services I performed. I attest to the accuracy of the note.      ED Course as of 12/04/22 2113   Wed Nov 30, 2022   1843 EKG:  NSR, rate of 90, normal intervals, L axis.  No new ST/T wave changes compared to prior.  No sign of acute ischemia or infarction.   [MR]   2047 Pt will be admitted to hospital medicine.  [HS]   Thu Dec 01, 2022   0715 BP(!): 177/98 [EF]   0715 Pulse: 82 [EF]   0715 SpO2(!): 94 % [EF]   0719 Signed over from Dr. Brooke, nausea vomiting.  Pending hospital admission, no available beds.  Boarding in the ER. [EF]      ED Course User Index  [EF] Elfego Manzo MD  [HS] Thelma Mata PA-C  [MR] Kaushik Patton MD               I, Thelma Mata PA-C, personally performed the services described in this documentation. All medical record entries made by the scribe were at my direction and in my presence.  I have reviewed the chart and agree that the record reflects my personal performance and is accurate and complete. Thelma Mata PA-C.  8:21 PM 12/04/2022      Clinical Impression:   Final diagnoses:  [R10.13] Epigastric pain  [R11.2] Nausea and vomiting, unspecified vomiting type (Primary)  [R11.2] Intractable nausea and vomiting      ED Disposition Condition    Observation                 Thelma Mata PA-C  11/30/22 2127       Thelma Mata PA-C  12/04/22 2113       Thelma Mata PA-C  12/04/22 2114

## 2022-12-01 NOTE — ASSESSMENT & PLAN NOTE
Patient's anemia is currently controlled. Has not received any PRBCs to date.. Etiology likely d/t chronic disease  Current CBC reviewed-   Lab Results   Component Value Date    HGB 7.7 (L) 11/30/2022    HCT 22.4 (L) 11/30/2022     Monitor serial CBC and transfuse if patient becomes hemodynamically unstable, symptomatic or H/H drops below 7/21.

## 2022-12-01 NOTE — ASSESSMENT & PLAN NOTE
Patient's FSGs are uncontrolled due to hyperglycemia on current medication regimen.  Last A1c reviewed-   Lab Results   Component Value Date    HGBA1C 6.2 08/24/2022     Most recent fingerstick glucose reviewed-   Recent Labs   Lab 11/30/22 2010 11/30/22 2012 12/01/22  0049   POCTGLUCOSE >500* >500* 374*     Current correctional scale  Medium  Maintain anti-hyperglycemic dose as follows-   Antihyperglycemics (From admission, onward)    Start     Stop Route Frequency Ordered    12/01/22 0900  insulin detemir U-100 pen 5 Units         -- SubQ Daily 11/30/22 2145    11/30/22 2240  insulin aspart U-100 pen 1-10 Units         -- SubQ Before meals & nightly PRN 11/30/22 2145        Hold Oral hypoglycemics while patient is in the hospital.

## 2022-12-01 NOTE — ASSESSMENT & PLAN NOTE
Nutrition consulted. Most recent weight and BMI monitored-                         Measurements:  Wt Readings from Last 1 Encounters:   11/30/22 59 kg (130 lb)   Body mass index is 23.78 kg/m².    Recommendations:      Patient has been screened and assessed by RD. RD will follow patient.

## 2022-12-01 NOTE — ED NOTES
Patient has abdominal pain and last hemodialysis treatment was Saturday.  Patient is moaning and crying due to severe pain.

## 2022-12-01 NOTE — ASSESSMENT & PLAN NOTE
Creatine stable for now. BMP reviewed- noted Estimated Creatinine Clearance: 14.1 mL/min (A) (based on SCr of 4.5 mg/dL (H)). according to latest data. Monitor UOP and serial BMP and adjust therapy as needed. Renally dose meds.    HD tues/thurs/sat  Consult nephrology for HD

## 2022-12-01 NOTE — HPI
Tabby Howard is a 32 year old female with ESRD on HD, DM, HFpEF, GERD, suspected gastroparesis, sickle cell trait, HTN, who presented to the ED for evaluation of abdominal pain with nausea,vomiting and diarrhea. She has had multiple ED visits and admissions for the same complaint in the past, for intractable nausea and vomiting as well as for gastroparesis. She is a dialysis patient, with her last session performed Tuesday. She states she has had chest discomfort and abdominal bloating since then. She denies urinary complaint, blood in vomit or stool, fever, chills or other complaint.    ED work up included a CBC which showed chronic anemia and thrombocytopenia. CMP showed an elevated glucose of 587 with no evidence of acidosis. Liver enzymes are elevated from baseline today.  She was given a small fluid bolus in the ED, as well as one dose of morphine and several antiemetics. She reported relief after receiving droperidol. EKG was reviewed with no sign of acute ischemia or infarction. She was given IV insulin as well. Hospital Medicine consulted for admission and further management.

## 2022-12-01 NOTE — SUBJECTIVE & OBJECTIVE
Past Medical History:   Diagnosis Date    CKD (chronic kidney disease), stage IV 2022    Diabetes mellitus due to underlying condition with unspecified complications 2022    Gastroparesis 2022    Heart failure with preserved ejection fraction 2022    EF 55% on 3/22    History of gastroesophageal reflux (GERD)     History of supraventricular tachycardia     Hyperkalemia 2022    Hypertensive emergency 2022    Sickle cell trait 2022    Type 2 diabetes mellitus        Past Surgical History:   Procedure Laterality Date     SECTION      x 3    COLONOSCOPY      COLONOSCOPY N/A 2022    Procedure: COLONOSCOPY;  Surgeon: Jagdeep Cedeno MD;  Location: [facility];  Service: Endoscopy;  Laterality: N/A;    ESOPHAGOGASTRODUODENOSCOPY N/A 10/18/2019    Procedure: ESOPHAGOGASTRODUODENOSCOPY (EGD);  Surgeon: Gianluca Mendez MD;  Location: [facility];  Service: Endoscopy;  Laterality: N/A;    ESOPHAGOGASTRODUODENOSCOPY N/A 2022    Procedure: EGD (ESOPHAGOGASTRODUODENOSCOPY);  Surgeon: Micky Paredes III, MD;  Location: [facility];  Service: Endoscopy;  Laterality: N/A;    LAPAROSCOPIC CHOLECYSTECTOMY N/A 2022    Procedure: CHOLECYSTECTOMY, LAPAROSCOPIC;  Surgeon: Grey Perez MD;  Location: [facility];  Service: General;  Laterality: N/A;    PLACEMENT OF DUAL-LUMEN VASCULAR CATHETER Left 2022    Procedure: INSERTION-CATHETER-JOSEPH;  Surgeon: Dionte Gan MD;  Location: [facility] OR;  Service: General;  Laterality: Left;    PLACEMENT OF DUAL-LUMEN VASCULAR CATHETER Right 2022    Procedure: INSERTION-CATHETER-Hemosplit;  Surgeon: Dionte Gan MD;  Location: [facility] OR;  Service: General;  Laterality: Right;       Review of patient's allergies indicates:   Allergen Reactions    Penicillins Hives       No current facility-administered medications on file prior to encounter.     Current Outpatient Medications on File Prior to Encounter   Medication Sig    amLODIPine  "(NORVASC) 10 MG tablet Take 1 tablet (10 mg total) by mouth once daily.    apixaban (ELIQUIS) 5 mg Tab Take 1 tablet (5 mg total) by mouth 2 (two) times daily.    carvediloL (COREG) 25 MG tablet Take 1 tablet (25 mg total) by mouth 2 (two) times daily.    cloNIDine 0.2 mg/24 hr td ptwk (CATAPRES) 0.2 mg/24 hr Place 1 patch onto the skin every 7 days.    FLUoxetine 20 MG capsule Take 1 capsule (20 mg total) by mouth once daily.    gabapentin (NEURONTIN) 100 MG capsule Take 1 capsule (100 mg total) by mouth 2 (two) times daily.    insulin aspart U-100 (NOVOLOG) 100 unit/mL (3 mL) InPn pen Inject 1-10 Units into the skin before meals and at bedtime as needed (Hyperglycemia). Max daily dose 40 units.    insulin detemir U-100 (LEVEMIR FLEXTOUCH) 100 unit/mL (3 mL) SubQ InPn pen Inject 5 Units into the skin once daily. Discard pen after 42 days.    insulin glargine (LANTUS) 100 unit/mL injection Inject 5 Units into the skin every morning.    isosorbide mononitrate (IMDUR) 60 MG 24 hr tablet Take 2 tablets (120 mg total) by mouth once daily.    levETIRAcetam (KEPPRA) 500 MG Tab Take 1 tablet (500 mg total) by mouth 2 (two) times daily.    methocarbamoL (ROBAXIN) 500 MG Tab Take 1 tablet (500 mg total) by mouth 3 (three) times daily.    ondansetron (ZOFRAN) 4 MG tablet Take 1 tablet (4 mg total) by mouth every 8 (eight) hours as needed for Nausea.    pantoprazole (PROTONIX) 40 MG tablet Take 40 mg by mouth once daily.    pen needle, diabetic (BD ULTRA-FINE MAGALIE PEN NEEDLE) 32 gauge x 5/32" Ndle Inject into the skin 5 times per day with insulin    albuterol (PROVENTIL/VENTOLIN HFA) 90 mcg/actuation inhaler Inhale 2 puffs into the lungs every 6 (six) hours as needed for Wheezing. Rescue    hydrALAZINE (APRESOLINE) 100 MG tablet Take 1 tablet (100 mg total) by mouth every 8 (eight) hours.    [DISCONTINUED] atenoloL (TENORMIN) 50 MG tablet Take 1 tablet (50 mg total) by mouth every other day.    [DISCONTINUED] omeprazole " (PRILOSEC) 20 MG capsule Take 2 capsules (40 mg total) by mouth once daily. for 10 days    [DISCONTINUED] torsemide (DEMADEX) 20 MG Tab Take 1 tablet (20 mg total) by mouth 2 (two) times a day. (Patient taking differently: Take 20 mg by mouth once daily.)     Family History       Problem Relation (Age of Onset)    Diabetes Mother, Father          Tobacco Use    Smoking status: Never    Smokeless tobacco: Never   Substance and Sexual Activity    Alcohol use: Not Currently    Drug use: No    Sexual activity: Not Currently     Partners: Male     Birth control/protection: I.U.D.     Review of Systems   Constitutional:  Negative for chills and fever.   HENT:  Negative for congestion and sore throat.    Eyes:  Negative for visual disturbance.   Respiratory:  Negative for cough and shortness of breath.    Cardiovascular:  Positive for chest pain. Negative for palpitations.   Gastrointestinal:  Positive for abdominal distention, abdominal pain, diarrhea, nausea and vomiting.   Endocrine: Negative for cold intolerance and heat intolerance.   Genitourinary:  Negative for dysuria and hematuria.   Musculoskeletal:  Negative for arthralgias and myalgias.   Skin:  Negative for pallor and rash.   Neurological:  Negative for tremors and seizures.   Hematological:  Negative for adenopathy. Does not bruise/bleed easily.   Psychiatric/Behavioral:  Negative for hallucinations.    All other systems reviewed and are negative.  Objective:     Vital Signs (Most Recent):  Temp: 97.7 °F (36.5 °C) (11/30/22 1810)  Pulse: 87 (12/01/22 0030)  Resp: 16 (12/01/22 0030)  BP: (!) 182/103 (12/01/22 0030)  SpO2: 96 % (12/01/22 0030)   Vital Signs (24h Range):  Temp:  [97.7 °F (36.5 °C)] 97.7 °F (36.5 °C)  Pulse:  [83-94] 87  Resp:  [16-20] 16  SpO2:  [89 %-100 %] 96 %  BP: (168-211)/() 182/103     Weight: 59 kg (130 lb)  Body mass index is 23.78 kg/m².    Physical Exam  Vitals and nursing note reviewed.   Constitutional:       General: She is  awake. She is not in acute distress.     Appearance: Normal appearance. She is well-developed. She is ill-appearing (chronically ill).   HENT:      Head: Normocephalic and atraumatic.      Mouth/Throat:      Lips: Pink.      Mouth: Mucous membranes are dry.   Eyes:      Conjunctiva/sclera: Conjunctivae normal.      Pupils: Pupils are equal, round, and reactive to light.   Neck:      Thyroid: No thyromegaly.      Vascular: No JVD.   Cardiovascular:      Rate and Rhythm: Normal rate and regular rhythm.      Heart sounds: Normal heart sounds, S1 normal and S2 normal. No murmur heard.    No friction rub. No gallop.   Pulmonary:      Effort: Pulmonary effort is normal.      Breath sounds: Normal breath sounds.   Abdominal:      General: Bowel sounds are normal. There is no distension.      Palpations: Abdomen is soft. There is no mass.      Tenderness: There is no abdominal tenderness.   Musculoskeletal:         General: Normal range of motion.      Cervical back: Full passive range of motion without pain, normal range of motion and neck supple.   Skin:     General: Skin is warm and dry.      Capillary Refill: Capillary refill takes less than 2 seconds.   Neurological:      General: No focal deficit present.      Mental Status: She is alert and oriented to person, place, and time. Mental status is at baseline.      GCS: GCS eye subscore is 4. GCS verbal subscore is 5. GCS motor subscore is 6.      Cranial Nerves: No cranial nerve deficit.      Sensory: Sensation is intact.      Motor: Motor function is intact.   Psychiatric:         Behavior: Behavior normal. Behavior is cooperative.         CRANIAL NERVES     CN III, IV, VI   Pupils are equal, round, and reactive to light.     Significant Labs: All pertinent labs within the past 24 hours have been reviewed.  ABGs:   Recent Labs   Lab 11/30/22 1942   PH 7.434   PCO2 39.8   HCO3 26.6   POCSATURATED 87*   BE 2   PO2 52     CBC:   Recent Labs   Lab 11/30/22  1848   WBC  7.62   HGB 7.7*   HCT 22.4*   *     CMP:   Recent Labs   Lab 11/30/22  1848   *   K 3.7   CL 92*   CO2 23   *   BUN 35*   CREATININE 4.5*   CALCIUM 10.1   PROT 8.3   ALBUMIN 3.8   BILITOT 2.8*   ALKPHOS 477*   AST 59*   *   ANIONGAP 19*     Lipase:   Recent Labs   Lab 11/30/22  1848   LIPASE 37     POCT Glucose:   Recent Labs   Lab 11/30/22 2010 11/30/22 2012 12/01/22  0049   POCTGLUCOSE >500* >500* 374*

## 2022-12-01 NOTE — CARE UPDATE
12/01/22 0843   Patient Assessment/Suction   Level of Consciousness (AVPU) alert   Respiratory Effort Unlabored   All Lung Fields Breath Sounds clear   PRE-TX-O2   O2 Device (Oxygen Therapy) nasal cannula   $ Is the patient on Low Flow Oxygen? Yes   Flow (L/min) 3   SpO2 95 %   Pulse Oximetry Type Continuous   $ Pulse Oximetry - Multiple Charge Pulse Oximetry - Multiple   Pulse 83   Resp 15

## 2022-12-01 NOTE — H&P
[facility] Emergency [facility] Medicine  History & Physical    Patient Name: Tabby Howard  MRN: [id number]  Patient Class: OP- Observation  Admission Date: 11/30/2022  Attending Physician: Jason Peres MD   Primary Care Provider: [facility]         Patient information was obtained from patient, past medical records and ER records.     Subjective:     Principal Problem:Intractable nausea and vomiting    Chief Complaint:   Chief Complaint   Patient presents with    Abdominal Pain     Started today with NVD        HPI: Tabby Howard is a 32 year old female with ESRD on HD, DM, HFpEF, GERD, suspected gastroparesis, sickle cell trait, HTN, who presented to the ED for evaluation of abdominal pain with nausea,vomiting and diarrhea. She has had multiple ED visits and admissions for the same complaint in the past, for intractable nausea and vomiting as well as for gastroparesis. She is a dialysis patient, with her last session performed Tuesday. She states she has had chest discomfort and abdominal bloating since then. She denies urinary complaint, blood in vomit or stool, fever, chills or other complaint.    ED work up included a CBC which showed chronic anemia and thrombocytopenia. CMP showed an elevated glucose of 587 with no evidence of acidosis. Liver enzymes are elevated from baseline today.  She was given a small fluid bolus in the ED, as well as one dose of morphine and several antiemetics. She reported relief after receiving droperidol. EKG was reviewed with no sign of acute ischemia or infarction. She was given IV insulin as well. Hospital Medicine consulted for admission and further management.      Past Medical History:   Diagnosis Date    CKD (chronic kidney disease), stage IV 4/12/2022    Diabetes mellitus due to underlying condition with unspecified complications 4/12/2022    Gastroparesis 4/12/2022    Heart failure with preserved ejection fraction 4/12/2022     EF 55% on 3/22    History of gastroesophageal reflux (GERD)     History of supraventricular tachycardia     Hyperkalemia 2022    Hypertensive emergency 2022    Sickle cell trait 2022    Type 2 diabetes mellitus        Past Surgical History:   Procedure Laterality Date     SECTION      x 3    COLONOSCOPY      COLONOSCOPY N/A 2022    Procedure: COLONOSCOPY;  Surgeon: Jagdeep Cedeno MD;  Location: [facility];  Service: Endoscopy;  Laterality: N/A;    ESOPHAGOGASTRODUODENOSCOPY N/A 10/18/2019    Procedure: ESOPHAGOGASTRODUODENOSCOPY (EGD);  Surgeon: Gianluca Mendez MD;  [facility]: [facility];  Service: Endoscopy;  Laterality: N/A;    ESOPHAGOGASTRODUODENOSCOPY N/A 2022    Procedure: EGD (ESOPHAGOGASTRODUODENOSCOPY);  Surgeon: Micky Paredes III, MD;  Location: [facility];  Service: Endoscopy;  Laterality: N/A;    LAPAROSCOPIC CHOLECYSTECTOMY N/A 2022    Procedure: CHOLECYSTECTOMY, LAPAROSCOPIC;  Surgeon: Grey Perez MD;  Location: [facility] OR;  Service: General;  Laterality: N/A;    PLACEMENT OF DUAL-LUMEN VASCULAR CATHETER Left 2022    Procedure: INSERTION-CATHETER-JOSEPH;  Surgeon: Dionte Gan MD;  Location: [facility] OR;  Service: General;  Laterality: Left;    PLACEMENT OF DUAL-LUMEN VASCULAR CATHETER Right 2022    Procedure: INSERTION-CATHETER-Hemosplit;  Surgeon: Dionte Gna MD;  Location: [facility];  Service: General;  Laterality: Right;       Review of patient's allergies indicates:   Allergen Reactions    Penicillins Hives       No current facility-administered medications on file prior to encounter.     Current Outpatient Medications on File Prior to Encounter   Medication Sig    amLODIPine (NORVASC) 10 MG tablet Take 1 tablet (10 mg total) by mouth once daily.    apixaban (ELIQUIS) 5 mg Tab Take 1 tablet (5 mg total) by mouth 2 (two) times daily.    carvediloL (COREG) 25 MG tablet Take 1 tablet (25 mg total) by mouth 2 (two) times daily.  "   cloNIDine 0.2 mg/24 hr td ptwk (CATAPRES) 0.2 mg/24 hr Place 1 patch onto the skin every 7 days.    FLUoxetine 20 MG capsule Take 1 capsule (20 mg total) by mouth once daily.    gabapentin (NEURONTIN) 100 MG capsule Take 1 capsule (100 mg total) by mouth 2 (two) times daily.    insulin aspart U-100 (NOVOLOG) 100 unit/mL (3 mL) InPn pen Inject 1-10 Units into the skin before meals and at bedtime as needed (Hyperglycemia). Max daily dose 40 units.    insulin detemir U-100 (LEVEMIR FLEXTOUCH) 100 unit/mL (3 mL) SubQ InPn pen Inject 5 Units into the skin once daily. Discard pen after 42 days.    insulin glargine (LANTUS) 100 unit/mL injection Inject 5 Units into the skin every morning.    isosorbide mononitrate (IMDUR) 60 MG 24 hr tablet Take 2 tablets (120 mg total) by mouth once daily.    levETIRAcetam (KEPPRA) 500 MG Tab Take 1 tablet (500 mg total) by mouth 2 (two) times daily.    methocarbamoL (ROBAXIN) 500 MG Tab Take 1 tablet (500 mg total) by mouth 3 (three) times daily.    ondansetron (ZOFRAN) 4 MG tablet Take 1 tablet (4 mg total) by mouth every 8 (eight) hours as needed for Nausea.    pantoprazole (PROTONIX) 40 MG tablet Take 40 mg by mouth once daily.    pen needle, diabetic (BD ULTRA-FINE MAGALIE PEN NEEDLE) 32 gauge x 5/32" Ndle Inject into the skin 5 times per day with insulin    albuterol (PROVENTIL/VENTOLIN HFA) 90 mcg/actuation inhaler Inhale 2 puffs into the lungs every 6 (six) hours as needed for Wheezing. Rescue    hydrALAZINE (APRESOLINE) 100 MG tablet Take 1 tablet (100 mg total) by mouth every 8 (eight) hours.    [DISCONTINUED] atenoloL (TENORMIN) 50 MG tablet Take 1 tablet (50 mg total) by mouth every other day.    [DISCONTINUED] omeprazole (PRILOSEC) 20 MG capsule Take 2 capsules (40 mg total) by mouth once daily. for 10 days    [DISCONTINUED] torsemide (DEMADEX) 20 MG Tab Take 1 tablet (20 mg total) by mouth 2 (two) times a day. (Patient taking differently: Take 20 mg by mouth " once daily.)     Family History       Problem Relation (Age of Onset)    Diabetes Mother, Father          Tobacco Use    Smoking status: Never    Smokeless tobacco: Never   Substance and Sexual Activity    Alcohol use: Not Currently    Drug use: No    Sexual activity: Not Currently     Partners: Male     Birth control/protection: I.U.D.     Review of Systems   Constitutional:  Negative for chills and fever.   HENT:  Negative for congestion and sore throat.    Eyes:  Negative for visual disturbance.   Respiratory:  Negative for cough and shortness of breath.    Cardiovascular:  Positive for chest pain. Negative for palpitations.   Gastrointestinal:  Positive for abdominal distention, abdominal pain, diarrhea, nausea and vomiting.   Endocrine: Negative for cold intolerance and heat intolerance.   Genitourinary:  Negative for dysuria and hematuria.   Musculoskeletal:  Negative for arthralgias and myalgias.   Skin:  Negative for pallor and rash.   Neurological:  Negative for tremors and seizures.   Hematological:  Negative for adenopathy. Does not bruise/bleed easily.   Psychiatric/Behavioral:  Negative for hallucinations.    All other systems reviewed and are negative.  Objective:     Vital Signs (Most Recent):  Temp: 97.7 °F (36.5 °C) (11/30/22 1810)  Pulse: 87 (12/01/22 0030)  Resp: 16 (12/01/22 0030)  BP: (!) 182/103 (12/01/22 0030)  SpO2: 96 % (12/01/22 0030)   Vital Signs (24h Range):  Temp:  [97.7 °F (36.5 °C)] 97.7 °F (36.5 °C)  Pulse:  [83-94] 87  Resp:  [16-20] 16  SpO2:  [89 %-100 %] 96 %  BP: (168-211)/() 182/103     Weight: 59 kg (130 lb)  Body mass index is 23.78 kg/m².    Physical Exam  Vitals and nursing note reviewed.   Constitutional:       General: She is awake. She is not in acute distress.     Appearance: Normal appearance. She is well-developed. She is ill-appearing (chronically ill).   HENT:      Head: Normocephalic and atraumatic.      Mouth/Throat:      Lips: Pink.      Mouth: Mucous  membranes are dry.   Eyes:      Conjunctiva/sclera: Conjunctivae normal.      Pupils: Pupils are equal, round, and reactive to light.   Neck:      Thyroid: No thyromegaly.      Vascular: No JVD.   Cardiovascular:      Rate and Rhythm: Normal rate and regular rhythm.      Heart sounds: Normal heart sounds, S1 normal and S2 normal. No murmur heard.    No friction rub. No gallop.   Pulmonary:      Effort: Pulmonary effort is normal.      Breath sounds: Normal breath sounds.   Abdominal:      General: Bowel sounds are normal. There is no distension.      Palpations: Abdomen is soft. There is no mass.      Tenderness: There is no abdominal tenderness.   Musculoskeletal:         General: Normal range of motion.      Cervical back: Full passive range of motion without pain, normal range of motion and neck supple.   Skin:     General: Skin is warm and dry.      Capillary Refill: Capillary refill takes less than 2 seconds.   Neurological:      General: No focal deficit present.      Mental Status: She is alert and oriented to person, place, and time. Mental status is at baseline.      GCS: GCS eye subscore is 4. GCS verbal subscore is 5. GCS motor subscore is 6.      Cranial Nerves: No cranial nerve deficit.      Sensory: Sensation is intact.      Motor: Motor function is intact.   Psychiatric:         Behavior: Behavior normal. Behavior is cooperative.         CRANIAL NERVES     CN III, IV, VI   Pupils are equal, round, and reactive to light.     Significant Labs: All pertinent labs within the past 24 hours have been reviewed.  ABGs:   Recent Labs   Lab 11/30/22 1942   PH 7.434   PCO2 39.8   HCO3 26.6   POCSATURATED 87*   BE 2   PO2 52     CBC:   Recent Labs   Lab 11/30/22 1848   WBC 7.62   HGB 7.7*   HCT 22.4*   *     CMP:   Recent Labs   Lab 11/30/22 1848   *   K 3.7   CL 92*   CO2 23   *   BUN 35*   CREATININE 4.5*   CALCIUM 10.1   PROT 8.3   ALBUMIN 3.8   BILITOT 2.8*   ALKPHOS 477*   AST 59*    *   ANIONGAP 19*     Lipase:   Recent Labs   Lab 11/30/22  1848   LIPASE 37     POCT Glucose:   Recent Labs   Lab 11/30/22 2010 11/30/22 2012 12/01/22  0049   POCTGLUCOSE >500* >500* 374*           Assessment/Plan:     * Intractable nausea and vomiting  Admit to obs  The patient has had multiple admissions for similar complaints, with notes regarding pain med seeking behavior, where she states she only likes Dilaudid IV. Conservative narcotic administration. Discomfort appears to be controlled with antiemetics, mainly droperidol.       Zofran, Compazine for nausea PRN, watch Qtc  Avoid narcotics if possible, try gabapentin    Advance diet as tolerated          Anemia, chronic disease    Patient's anemia is currently controlled. Has not received any PRBCs to date.. Etiology likely d/t chronic disease  Current CBC reviewed-   Lab Results   Component Value Date    HGB 7.7 (L) 11/30/2022    HCT 22.4 (L) 11/30/2022     Monitor serial CBC and transfuse if patient becomes hemodynamically unstable, symptomatic or H/H drops below 7/21.       Malnutrition of moderate degree  Nutrition consulted. Most recent weight and BMI monitored-                         Measurements:  Wt Readings from Last 1 Encounters:   11/30/22 59 kg (130 lb)   Body mass index is 23.78 kg/m².    Recommendations:      Patient has been screened and assessed by RD. RD will follow patient.      Hypertension  Chronic, uncontrolled.  Latest blood pressure and vitals reviewed-   Temp:  [97.7 °F (36.5 °C)]   Pulse:  [83-94]   Resp:  [16-20]   BP: (168-211)/()   SpO2:  [89 %-100 %] .   Home meds for hypertension were reviewed and noted below.   Hypertension Medications             amLODIPine (NORVASC) 10 MG tablet Take 1 tablet (10 mg total) by mouth once daily.    carvediloL (COREG) 25 MG tablet Take 1 tablet (25 mg total) by mouth 2 (two) times daily.    cloNIDine 0.2 mg/24 hr td ptwk (CATAPRES) 0.2 mg/24 hr Place 1 patch onto the skin every 7  days.    hydrALAZINE (APRESOLINE) 100 MG tablet Take 1 tablet (100 mg total) by mouth every 8 (eight) hours.    isosorbide mononitrate (IMDUR) 60 MG 24 hr tablet Take 2 tablets (120 mg total) by mouth once daily.          While in the hospital, will manage blood pressure as follows; Continue home antihypertensive regimen    Will utilize p.r.n. blood pressure medication only if patient's blood pressure greater than  180/110 and she develops symptoms such as worsening chest pain or shortness of breath.        Deep vein thrombosis (DVT) of non-extremity vein  Noted, on eliquis      ESRD (end stage renal disease) on dialysis  Creatine stable for now. BMP reviewed- noted Estimated Creatinine Clearance: 14.1 mL/min (A) (based on SCr of 4.5 mg/dL (H)). according to latest data. Monitor UOP and serial BMP and adjust therapy as needed. Renally dose meds.    HD tues/thurs/sat  Consult nephrology for HD      Seizure  Noted, chronic  Continue keppra  Seizure precautions      Gastroparesis - suspected, unconfirmed        Type 2 diabetes mellitus with chronic kidney disease on chronic dialysis, with long-term current use of insulin  Patient's FSGs are uncontrolled due to hyperglycemia on current medication regimen.  Last A1c reviewed-   Lab Results   Component Value Date    HGBA1C 6.2 08/24/2022     Most recent fingerstick glucose reviewed-   Recent Labs   Lab 11/30/22 2010 11/30/22 2012 12/01/22  0049   POCTGLUCOSE >500* >500* 374*     Current correctional scale  Medium  Maintain anti-hyperglycemic dose as follows-   Antihyperglycemics (From admission, onward)    Start     Stop Route Frequency Ordered    12/01/22 0900  insulin detemir U-100 pen 5 Units         -- SubQ Daily 11/30/22 2145 11/30/22 2240  insulin aspart U-100 pen 1-10 Units         -- SubQ Before meals & nightly PRN 11/30/22 2145        Hold Oral hypoglycemics while patient is in the hospital.    Gastritis  Noted, chronic  protonix        VTE Risk Mitigation  (From admission, onward)         Ordered     apixaban tablet 5 mg  2 times daily         11/30/22 2145     Reason for No Pharmacological VTE Prophylaxis  Once        Question:  Reasons:  Answer:  Already adequately anticoagulated on oral Anticoagulants    11/30/22 2145     IP VTE HIGH RISK PATIENT  Once         11/30/22 2145     Place sequential compression device  Until discontinued         11/30/22 2145                   JACQUELINE Arroyo  Department of Hospital Medicine   [facility] - Emergency Dept

## 2022-12-01 NOTE — ASSESSMENT & PLAN NOTE
Admit to obs  The patient has had multiple admissions for similar complaints, with notes regarding pain med seeking behavior, where she states she only likes Dilaudid IV. Conservative narcotic administration. Discomfort appears to be controlled with antiemetics, mainly droperidol.       Zofran, Compazine for nausea PRN, watch Qtc  Avoid narcotics if possible, try gabapentin    Advance diet as tolerated

## 2022-12-01 NOTE — CONSULTS
Nephrology Consult Note        Patient Name: Tabby Howard  MRN: [id number]    Patient Class: OP- Observation   Admission Date: 2022  Length of Stay: 0 days  Date of Service: 2022    Attending Physician: Jason Peres MD  Primary Care Provider: [facility]    Reason for Consult: esrd/anemia/htn/shpt/abdominal pain    SUBJECTIVE:     HPI: 32F with ESRD on HD, DM, HFpEF, GERD, suspected gastroparesis, sickle cell trait, HTN, presented to the ED for evaluation of abdominal pain with nausea,vomiting and diarrhea. She has had multiple ED visits and admissions for intractable nausea and vomiting as well as for gastroparesis. Her last HD session performed Tuesday. She has chest discomfort and abdominal bloating since then.     ED work up showed chronic anemia and thrombocytopenia, elevated glucose of 587 with no evidence of acidosis.  She was given a small fluid bolus in the ED, as well as one dose of morphine and several antiemetics. She reported relief after receiving droperidol. EKG was reviewed with no sign of acute ischemia or infarction. She was given IV insulin as well. Dialysis and blood transfusion was emergently performed.    Past Medical History:   Diagnosis Date    CKD (chronic kidney disease), stage IV 2022    Diabetes mellitus due to underlying condition with unspecified complications 2022    Gastroparesis 2022    Heart failure with preserved ejection fraction 2022    EF 55% on 3/22    History of gastroesophageal reflux (GERD)     History of supraventricular tachycardia     Hyperkalemia 2022    Hypertensive emergency 2022    Sickle cell trait 2022    Type 2 diabetes mellitus      Past Surgical History:   Procedure Laterality Date     SECTION      x 3    COLONOSCOPY      COLONOSCOPY N/A 2022    Procedure: COLONOSCOPY;  Surgeon: Jagdeep Cedeno MD;  Location: [facility];  Service: Endoscopy;  Laterality: N/A;     ESOPHAGOGASTRODUODENOSCOPY N/A 10/18/2019    Procedure: ESOPHAGOGASTRODUODENOSCOPY (EGD);  Surgeon: Gianluca Mendez MD;  Location: [facility] ENDO;  Service: Endoscopy;  Laterality: N/A;    ESOPHAGOGASTRODUODENOSCOPY N/A 08/24/2022    Procedure: EGD (ESOPHAGOGASTRODUODENOSCOPY);  Surgeon: Micky Paredes III, MD;  Location: [facility] ENDO;  Service: Endoscopy;  Laterality: N/A;    LAPAROSCOPIC CHOLECYSTECTOMY N/A 07/30/2022    Procedure: CHOLECYSTECTOMY, LAPAROSCOPIC;  Surgeon: Grey Perez MD;  Location: [facility] OR;  Service: General;  Laterality: N/A;    PLACEMENT OF DUAL-LUMEN VASCULAR CATHETER Left 07/12/2022    Procedure: INSERTION-CATHETER-JOSEPH;  Surgeon: Dionte Gan MD;  Location: [facility] OR;  Service: General;  Laterality: Left;    PLACEMENT OF DUAL-LUMEN VASCULAR CATHETER Right 07/26/2022    Procedure: INSERTION-CATHETER-Hemosplit;  Surgeon: Dionte Gan MD;  Location: [facility] OR;  Service: General;  Laterality: Right;     Family History   Problem Relation Age of Onset    Diabetes Mother     Diabetes Father      Social History     Tobacco Use    Smoking status: Never    Smokeless tobacco: Never   Substance Use Topics    Alcohol use: Not Currently    Drug use: No       Review of patient's allergies indicates:   Allergen Reactions    Penicillins Hives       Outpatient meds:  No current facility-administered medications on file prior to encounter.     Current Outpatient Medications on File Prior to Encounter   Medication Sig Dispense Refill    amLODIPine (NORVASC) 10 MG tablet Take 1 tablet (10 mg total) by mouth once daily. 30 tablet 11    apixaban (ELIQUIS) 5 mg Tab Take 1 tablet (5 mg total) by mouth 2 (two) times daily. 60 tablet 2    carvediloL (COREG) 25 MG tablet Take 1 tablet (25 mg total) by mouth 2 (two) times daily. 60 tablet 2    cloNIDine 0.2 mg/24 hr td ptwk (CATAPRES) 0.2 mg/24 hr Place 1 patch onto the skin every 7 days. 4 patch 2    FLUoxetine 20 MG capsule Take 1 capsule (20 mg total) by mouth  "once daily. 30 capsule 11    gabapentin (NEURONTIN) 100 MG capsule Take 1 capsule (100 mg total) by mouth 2 (two) times daily. 90 capsule 2    insulin aspart U-100 (NOVOLOG) 100 unit/mL (3 mL) InPn pen Inject 1-10 Units into the skin before meals and at bedtime as needed (Hyperglycemia). Max daily dose 40 units. 3 mL 6    insulin detemir U-100 (LEVEMIR FLEXTOUCH) 100 unit/mL (3 mL) SubQ InPn pen Inject 5 Units into the skin once daily. Discard pen after 42 days. 6 mL 2    insulin glargine (LANTUS) 100 unit/mL injection Inject 5 Units into the skin every morning.      isosorbide mononitrate (IMDUR) 60 MG 24 hr tablet Take 2 tablets (120 mg total) by mouth once daily. 30 tablet 11    levETIRAcetam (KEPPRA) 500 MG Tab Take 1 tablet (500 mg total) by mouth 2 (two) times daily. 60 tablet 11    methocarbamoL (ROBAXIN) 500 MG Tab Take 1 tablet (500 mg total) by mouth 3 (three) times daily. 90 tablet 1    ondansetron (ZOFRAN) 4 MG tablet Take 1 tablet (4 mg total) by mouth every 8 (eight) hours as needed for Nausea. 60 tablet 2    pantoprazole (PROTONIX) 40 MG tablet Take 40 mg by mouth once daily.      pen needle, diabetic (BD ULTRA-FINE MAGALIE PEN NEEDLE) 32 gauge x 5/32" Ndle Inject into the skin 5 times per day with insulin 100 each 12    albuterol (PROVENTIL/VENTOLIN HFA) 90 mcg/actuation inhaler Inhale 2 puffs into the lungs every 6 (six) hours as needed for Wheezing. Rescue 18 g 3    hydrALAZINE (APRESOLINE) 100 MG tablet Take 1 tablet (100 mg total) by mouth every 8 (eight) hours. 90 tablet 2    [DISCONTINUED] atenoloL (TENORMIN) 50 MG tablet Take 1 tablet (50 mg total) by mouth every other day. 45 tablet 3    [DISCONTINUED] omeprazole (PRILOSEC) 20 MG capsule Take 2 capsules (40 mg total) by mouth once daily. for 10 days 20 capsule 0    [DISCONTINUED] torsemide (DEMADEX) 20 MG Tab Take 1 tablet (20 mg total) by mouth 2 (two) times a day. (Patient taking differently: Take 20 mg by mouth once daily.) 60 tablet 1 "       Scheduled meds:   sodium chloride 0.9%   Intravenous Once    amLODIPine  10 mg Oral Daily    apixaban  5 mg Oral BID    carvediloL  25 mg Oral BID    epoetin teresa-epbx  10,000 Units Subcutaneous Every Tues, Thurs, Sat    FLUoxetine  20 mg Oral Daily    gabapentin  100 mg Oral BID    insulin detemir U-100  5 Units Subcutaneous Daily    isosorbide mononitrate  120 mg Oral Daily    levETIRAcetam  500 mg Oral BID    methocarbamoL  500 mg Oral TID    mupirocin   Nasal BID    pantoprazole  40 mg Oral Daily    senna-docusate 8.6-50 mg  1 tablet Oral BID       Infusions:      PRN meds:  sodium chloride, acetaminophen, aluminum-magnesium hydroxide-simethicone, dextrose 10%, dextrose 10%, glucagon (human recombinant), glucose, glucose, HYDROmorphone, insulin aspart U-100, magnesium oxide, magnesium oxide, melatonin, naloxone, ondansetron, potassium bicarbonate, potassium bicarbonate, potassium bicarbonate, potassium, sodium phosphates, potassium, sodium phosphates, potassium, sodium phosphates, prochlorperazine, simethicone, sodium chloride 0.9%, sodium chloride 0.9%    Review of Systems:  Review of Systems   Constitutional:  Positive for malaise/fatigue. Negative for chills, fever and weight loss.   HENT:  Negative for hearing loss and nosebleeds.    Eyes:  Negative for blurred vision, double vision and photophobia.   Respiratory:  Negative for cough, shortness of breath and wheezing.    Cardiovascular:  Positive for chest pain. Negative for palpitations and leg swelling.   Gastrointestinal:  Positive for abdominal pain and nausea. Negative for constipation, diarrhea, heartburn and vomiting.   Genitourinary:  Negative for dysuria, frequency and urgency.   Musculoskeletal:  Negative for falls, joint pain and myalgias.   Skin:  Negative for itching and rash.   Neurological:  Positive for weakness. Negative for dizziness, speech change, focal weakness, loss of consciousness and headaches.   Endo/Heme/Allergies:  Does not  bruise/bleed easily.   Psychiatric/Behavioral:  Negative for depression and substance abuse. The patient is not nervous/anxious.      OBJECTIVE:     Vital Signs and IO:  Temp:  [97.7 °F (36.5 °C)-98.2 °F (36.8 °C)]   Pulse:  [80-94]   Resp:  []   BP: (148-211)/()   SpO2:  [60 %-100 %]   I/O last 3 completed shifts:  In: 500 [IV Piggyback:500]  Out: -   Wt Readings from Last 5 Encounters:   11/30/22 59 kg (130 lb)   11/19/22 56 kg (123 lb 7.3 oz)   11/20/22 56 kg (123 lb 7.3 oz)   11/12/22 57.1 kg (125 lb 14.1 oz)   11/06/22 59.9 kg (132 lb)     Body mass index is 23.78 kg/m².    Physical Exam  Constitutional:       General: She is not in acute distress.     Appearance: She is well-developed. She is not diaphoretic.   HENT:      Head: Normocephalic and atraumatic.      Mouth/Throat:      Mouth: Mucous membranes are moist.   Eyes:      General: No scleral icterus.     Pupils: Pupils are equal, round, and reactive to light.   Cardiovascular:      Rate and Rhythm: Normal rate and regular rhythm.   Pulmonary:      Effort: Pulmonary effort is normal. No respiratory distress.      Breath sounds: No stridor.   Abdominal:      General: There is no distension.      Palpations: Abdomen is soft.   Musculoskeletal:         General: No deformity. Normal range of motion.      Cervical back: Neck supple.   Skin:     General: Skin is warm and dry.      Findings: No erythema or rash.   Neurological:      Mental Status: She is alert and oriented to person, place, and time.      Cranial Nerves: No cranial nerve deficit.   Psychiatric:         Behavior: Behavior normal.       Laboratory:  Recent Labs   Lab 11/30/22 1848 12/01/22  0542   * 136   K 3.7 3.6   CL 92* 96   CO2 23 26   BUN 35* 40*   CREATININE 4.5* 4.8*   * 305*       Recent Labs   Lab 11/30/22 1848 12/01/22  0542   CALCIUM 10.1 8.8   ALBUMIN 3.8 2.8*   PHOS  --  5.2*   MG  --  1.8       Recent Labs   Lab 07/08/22  0516   PTH, Intact 289.8 H        Recent Labs   Lab 11/30/22 2010 11/30/22 2012 11/30/22  2135 12/01/22  0049 12/01/22  0118 12/01/22  0230 12/01/22  0609 12/01/22  1251   POCTGLUCOSE >500* >500* 473* 374* 381* 348* 306* 180*       Recent Labs   Lab 05/15/22  1954 07/22/22  1653 08/24/22  0218   Hemoglobin A1C 5.8 H 6.0 H 6.2       Recent Labs   Lab 11/30/22  1848 12/01/22  0542   WBC 7.62 4.88   HGB 7.7* 6.3*   HCT 22.4* 18.4*   * 98*   MCV 80* 80*   MCHC 34.4 34.2   MONO 3.7*  0.3 9.2  0.5       Recent Labs   Lab 11/30/22  1848 12/01/22  0542   BILITOT 2.8* 1.6*   PROT 8.3 6.2   ALBUMIN 3.8 2.8*   ALKPHOS 477* 354*   * 89*   AST 59* 37       Recent Labs   Lab 10/16/22  1736 10/24/22  0107 11/19/22  1210   Color, UA Yellow Yellow Yellow   Appearance, UA Hazy A Clear Clear   pH, UA 7.0 8.0 8.0   Specific Petty, UA 1.020 1.020 1.025   Protein, UA 4+ 4+ 4+   Glucose, UA 4+ A 4+ A 4+ A   Ketones, UA Trace A Negative Negative   Urobilinogen, UA Negative Negative Negative   Bilirubin (UA) Negative Negative 1+ A   Occult Blood UA 2+ A 2+ A 2+ A   Nitrite, UA Negative Negative Negative   RBC, UA 12 H 41 H 38 H   WBC, UA >100 H 24 H 25 H   Bacteria Negative Negative Negative   Hyaline Casts, UA 7 A 6 A 2 A       Recent Labs   Lab 10/04/22  0901 10/16/22  1643 11/30/22  1942   POC PH 7.378 7.398 7.434   POC PCO2 54.4 H 37.6 39.8   POC HCO3 32.0 H 23.2 L 26.6   POC PO2 26 L 55 52   POC SATURATED O2 44 L 88 L 87 L   POC BE 7 -2 2   Sample VENOUS VENOUS VENOUS       Microbiology Results (last 7 days)       ** No results found for the last 168 hours. **            ASSESSMENT/PLAN:     ESRD on HD TTS via AVF  Continue current dialysis prescription.  Next HD per schedule.  Renal diet - low K, low phos.  No IVs or BP checks on access and/or non-dominant arm.    Anemia of CKD  Hgb and HCT are acceptable. Monitor for now.  Will provide SHELLEY and/or IV iron PRN.  Transfused 2 PRBC on 12/1.    MBD / Secondary HPT  Ca, Phos, PTH and vitamin D  levels are acceptable.   Phos binders, vitamin D and analogues, calcimimetics will be given as needed.    HTN  BP seem controlled.   Tolerate asymptomatic HTN up to -160.  Continue home meds.  Low sodium diet.    Thank you for allowing us to participate in the care of your patient!   We will follow the patient and provide recommendations as needed.    Patient care time was spent personally by me on the following activities:     Obtaining a history.  Examination of patient.  Providing medical care at the patients bedside.  Developing a treatment plan with patient or surrogate and bedside caregivers.  Ordering and reviewing laboratory studies, radiographic studies, pulse oximetry.  Ordering and performing treatments and interventions.  Evaluation of patient's response to treatment.  Discussions with consultants while on the unit and immediately available to the patient.  Re-evaluation of the patient's condition.  Documentation in the medical record.     Mando Benitez MD    [facility]  [address] [place] [address]  [place] [address]    [phone] - tel  [phone] - fax    12/1/2022

## 2022-12-01 NOTE — ASSESSMENT & PLAN NOTE
Chronic, uncontrolled.  Latest blood pressure and vitals reviewed-   Temp:  [97.7 °F (36.5 °C)]   Pulse:  [83-94]   Resp:  [16-20]   BP: (168-211)/()   SpO2:  [89 %-100 %] .   Home meds for hypertension were reviewed and noted below.   Hypertension Medications             amLODIPine (NORVASC) 10 MG tablet Take 1 tablet (10 mg total) by mouth once daily.    carvediloL (COREG) 25 MG tablet Take 1 tablet (25 mg total) by mouth 2 (two) times daily.    cloNIDine 0.2 mg/24 hr td ptwk (CATAPRES) 0.2 mg/24 hr Place 1 patch onto the skin every 7 days.    hydrALAZINE (APRESOLINE) 100 MG tablet Take 1 tablet (100 mg total) by mouth every 8 (eight) hours.    isosorbide mononitrate (IMDUR) 60 MG 24 hr tablet Take 2 tablets (120 mg total) by mouth once daily.          While in the hospital, will manage blood pressure as follows; Continue home antihypertensive regimen    Will utilize p.r.n. blood pressure medication only if patient's blood pressure greater than  180/110 and she develops symptoms such as worsening chest pain or shortness of breath.

## 2022-12-01 NOTE — NURSING
Consent  and Hep b status verified, removed 3000 uf net, patient received 2 units of RBCs on HD. Patient stable throughout treatment. Educated patient on HD treatment. Dressing changed, CDI. Report to SHAILA Abbasi     12/01/22 1460   Handoff Report   Received From Eun   Given To Ayleen   Vital Signs   Temp 98.1 °F (36.7 °C)   Temp src Oral   Pulse 81   Heart Rate Source Monitor   Resp 16   SpO2 98 %   Pulse Oximetry Type Continuous   Flow (L/min) 4   O2 Device (Oxygen Therapy) nasal cannula   BP (!) 166/99   BP Location Right arm   BP Method Automatic   Patient Position Lying   Assessments (Pre/Post)   Consent Obtained yes   Safety vein preservation armband present   Date Hepatitis Profile Obtained 10/26/22   Blood Liters Processed (BLP) 59.9   Transport Modality stretcher   Level of Consciousness (AVPU) responds to voice   Dialyzer Clearance mildly streaked   Pain   Preferred Pain Scale number (Numeric Rating Pain Scale)   Comfort/Acceptable Pain Level 0   Pain Rating (0-10): Rest 0   Pain Rating (0-10): Activity 0   Pain Management Interventions quiet environment facilitated;pillow support provided;position adjusted   Pain/Comfort Interventions   Fever Reduction/Comfort Measures lightweight bedding;lightweight clothing   Pre-Hemodialysis Assessment   Additional Dialysis Information Needed Yes   Patient Status Departed   Treatment Consent Verified Yes   Treatment Status Completed        Hemodialysis Catheter 07/26/22 1457 right internal jugular   Placement Date/Time: 07/26/22 1457   Present Prior to Hospital Arrival?: No  Hand Hygiene: Performed  Barrier Precautions: Performed  Skin Antisepsis: ChloraPrep  Hemodialysis Catheter Type: Tunneled catheter  Location: right internal jugular  Cathete...   Line Necessity Review CRRT/HD   Site Assessment No drainage;No redness;No swelling;No warmth   Line Securement Device Secured with sutures   Dressing Type Biopatch in place;Central line dressing   Dressing Status  Clean;Dry;Intact   Dressing Intervention Integrity maintained   Date on Dressing 12/01/22   Dressing Due to be Changed 12/08/22   Venous Patency/Care flushed w/o difficulty;normal saline locked   Arterial Patency/Care flushed w/o difficulty;normal saline locked   Post-Hemodialysis Assessment   Rinseback Volume (mL) 250 mL   Blood Volume Processed (Liters) 59.9 L   Dialyzer Clearance Lightly streaked   Duration of Treatment 180 minutes   Additional Fluid Intake (mL) 700 mL   Total UF (mL) 4200 mL   Net Fluid Removal 3000   Patient Response to Treatment lamar   Post-Treatment Weight 56 kg (123 lb 7.3 oz)   Treatment Weight Change -3   Post-Hemodialysis Comments stable

## 2022-12-01 NOTE — PLAN OF CARE
SW attempted to meet with patient at bedside to complete discharge planning assessment without success.  CM will follow up.       12/01/22 5250   Discharge Assessment   Assessment Type Discharge Planning Assessment

## 2022-12-01 NOTE — ED NOTES
Spoke with hospitalist regarding oxygen saturation. Directly after receiving report upon assessing pt, oxygen tubing found to be connected to empty tank on bed & pt not wearing oxygen around nose. Oxygen saturation low ~90s%. Oxygen placed on pt correctly and connected to wall connector. Sats immediately improved to mid-80s%. Increased to 5L NC, sats improved to low-90s%. Charge nurse aware. Pt AAOx4. Will continue to monitor. VSS.

## 2022-12-02 PROBLEM — R10.84 INTRACTABLE GENERALIZED ABDOMINAL PAIN: Status: ACTIVE | Noted: 2022-12-02

## 2022-12-02 LAB
ALBUMIN SERPL BCP-MCNC: 2.9 G/DL (ref 3.5–5.2)
ALP SERPL-CCNC: 321 U/L (ref 55–135)
ALT SERPL W/O P-5'-P-CCNC: 79 U/L (ref 10–44)
ANION GAP SERPL CALC-SCNC: 9 MMOL/L (ref 8–16)
AST SERPL-CCNC: 33 U/L (ref 10–40)
BASOPHILS # BLD AUTO: 0.02 K/UL (ref 0–0.2)
BASOPHILS NFR BLD: 0.4 % (ref 0–1.9)
BILIRUB SERPL-MCNC: 1.5 MG/DL (ref 0.1–1)
BUN SERPL-MCNC: 23 MG/DL (ref 6–20)
CALCIUM SERPL-MCNC: 8.7 MG/DL (ref 8.7–10.5)
CHLORIDE SERPL-SCNC: 104 MMOL/L (ref 95–110)
CO2 SERPL-SCNC: 25 MMOL/L (ref 23–29)
CREAT SERPL-MCNC: 3.6 MG/DL (ref 0.5–1.4)
DIFFERENTIAL METHOD: ABNORMAL
EOSINOPHIL # BLD AUTO: 0.1 K/UL (ref 0–0.5)
EOSINOPHIL NFR BLD: 2 % (ref 0–8)
ERYTHROCYTE [DISTWIDTH] IN BLOOD BY AUTOMATED COUNT: 14.7 % (ref 11.5–14.5)
EST. GFR  (NO RACE VARIABLE): 16 ML/MIN/1.73 M^2
GLUCOSE SERPL-MCNC: 373 MG/DL (ref 70–110)
HCT VFR BLD AUTO: 26.7 % (ref 37–48.5)
HGB BLD-MCNC: 9.1 G/DL (ref 12–16)
IMM GRANULOCYTES # BLD AUTO: 0.02 K/UL (ref 0–0.04)
IMM GRANULOCYTES NFR BLD AUTO: 0.4 % (ref 0–0.5)
LYMPHOCYTES # BLD AUTO: 0.8 K/UL (ref 1–4.8)
LYMPHOCYTES NFR BLD: 14.5 % (ref 18–48)
MAGNESIUM SERPL-MCNC: 1.9 MG/DL (ref 1.6–2.6)
MCH RBC QN AUTO: 27.9 PG (ref 27–31)
MCHC RBC AUTO-ENTMCNC: 34.1 G/DL (ref 32–36)
MCV RBC AUTO: 82 FL (ref 82–98)
MONOCYTES # BLD AUTO: 0.5 K/UL (ref 0.3–1)
MONOCYTES NFR BLD: 9 % (ref 4–15)
NEUTROPHILS # BLD AUTO: 4.1 K/UL (ref 1.8–7.7)
NEUTROPHILS NFR BLD: 73.7 % (ref 38–73)
NRBC BLD-RTO: 0 /100 WBC
PHOSPHATE SERPL-MCNC: 3.6 MG/DL (ref 2.7–4.5)
PLATELET # BLD AUTO: 105 K/UL (ref 150–450)
PMV BLD AUTO: 9.5 FL (ref 9.2–12.9)
POCT GLUCOSE: 193 MG/DL (ref 70–110)
POCT GLUCOSE: 328 MG/DL (ref 70–110)
POCT GLUCOSE: 372 MG/DL (ref 70–110)
POTASSIUM SERPL-SCNC: 4 MMOL/L (ref 3.5–5.1)
PROT SERPL-MCNC: 6.4 G/DL (ref 6–8.4)
RBC # BLD AUTO: 3.26 M/UL (ref 4–5.4)
SODIUM SERPL-SCNC: 138 MMOL/L (ref 136–145)
WBC # BLD AUTO: 5.53 K/UL (ref 3.9–12.7)

## 2022-12-02 PROCEDURE — 83735 ASSAY OF MAGNESIUM: CPT | Performed by: NURSE PRACTITIONER

## 2022-12-02 PROCEDURE — 63600175 PHARM REV CODE 636 W HCPCS: Performed by: NURSE PRACTITIONER

## 2022-12-02 PROCEDURE — 27000221 HC OXYGEN, UP TO 24 HOURS

## 2022-12-02 PROCEDURE — 36415 COLL VENOUS BLD VENIPUNCTURE: CPT | Performed by: NURSE PRACTITIONER

## 2022-12-02 PROCEDURE — 25000003 PHARM REV CODE 250: Performed by: INTERNAL MEDICINE

## 2022-12-02 PROCEDURE — 80053 COMPREHEN METABOLIC PANEL: CPT | Performed by: NURSE PRACTITIONER

## 2022-12-02 PROCEDURE — 12000002 HC ACUTE/MED SURGE SEMI-PRIVATE ROOM

## 2022-12-02 PROCEDURE — 84100 ASSAY OF PHOSPHORUS: CPT | Performed by: NURSE PRACTITIONER

## 2022-12-02 PROCEDURE — 63600175 PHARM REV CODE 636 W HCPCS: Performed by: HOSPITALIST

## 2022-12-02 PROCEDURE — 25000003 PHARM REV CODE 250: Performed by: NURSE PRACTITIONER

## 2022-12-02 PROCEDURE — 25000003 PHARM REV CODE 250: Performed by: HOSPITALIST

## 2022-12-02 PROCEDURE — 85025 COMPLETE CBC W/AUTO DIFF WBC: CPT | Performed by: NURSE PRACTITIONER

## 2022-12-02 PROCEDURE — 96372 THER/PROPH/DIAG INJ SC/IM: CPT | Performed by: NURSE PRACTITIONER

## 2022-12-02 PROCEDURE — G0378 HOSPITAL OBSERVATION PER HR: HCPCS

## 2022-12-02 PROCEDURE — 94761 N-INVAS EAR/PLS OXIMETRY MLT: CPT

## 2022-12-02 RX ADMIN — HYDROMORPHONE HYDROCHLORIDE 0.5 MG: 2 INJECTION, SOLUTION INTRAMUSCULAR; INTRAVENOUS; SUBCUTANEOUS at 10:12

## 2022-12-02 RX ADMIN — SENNOSIDES AND DOCUSATE SODIUM 1 TABLET: 50; 8.6 TABLET ORAL at 08:12

## 2022-12-02 RX ADMIN — SIMETHICONE 80 MG: 80 TABLET, CHEWABLE ORAL at 08:12

## 2022-12-02 RX ADMIN — INSULIN ASPART 10 UNITS: 100 INJECTION, SOLUTION INTRAVENOUS; SUBCUTANEOUS at 08:12

## 2022-12-02 RX ADMIN — FLUOXETINE 20 MG: 20 CAPSULE ORAL at 08:12

## 2022-12-02 RX ADMIN — AMLODIPINE BESYLATE 10 MG: 5 TABLET ORAL at 08:12

## 2022-12-02 RX ADMIN — CARVEDILOL 25 MG: 25 TABLET, FILM COATED ORAL at 08:12

## 2022-12-02 RX ADMIN — LEVETIRACETAM 500 MG: 250 TABLET, FILM COATED ORAL at 08:12

## 2022-12-02 RX ADMIN — APIXABAN 2.5 MG: 2.5 TABLET, FILM COATED ORAL at 08:12

## 2022-12-02 RX ADMIN — HYDROMORPHONE HYDROCHLORIDE 0.5 MG: 2 INJECTION, SOLUTION INTRAMUSCULAR; INTRAVENOUS; SUBCUTANEOUS at 04:12

## 2022-12-02 RX ADMIN — METHOCARBAMOL 500 MG: 500 TABLET ORAL at 08:12

## 2022-12-02 RX ADMIN — INSULIN ASPART 6 UNITS: 100 INJECTION, SOLUTION INTRAVENOUS; SUBCUTANEOUS at 12:12

## 2022-12-02 RX ADMIN — ALUMINUM HYDROXIDE, MAGNESIUM HYDROXIDE, AND SIMETHICONE 30 ML: 200; 200; 20 SUSPENSION ORAL at 08:12

## 2022-12-02 RX ADMIN — MUPIROCIN: 20 OINTMENT TOPICAL at 08:12

## 2022-12-02 RX ADMIN — INSULIN ASPART 2 UNITS: 100 INJECTION, SOLUTION INTRAVENOUS; SUBCUTANEOUS at 05:12

## 2022-12-02 RX ADMIN — INSULIN ASPART 4 UNITS: 100 INJECTION, SOLUTION INTRAVENOUS; SUBCUTANEOUS at 08:12

## 2022-12-02 RX ADMIN — PANTOPRAZOLE SODIUM 40 MG: 40 TABLET, DELAYED RELEASE ORAL at 08:12

## 2022-12-02 RX ADMIN — METHOCARBAMOL 500 MG: 500 TABLET ORAL at 03:12

## 2022-12-02 RX ADMIN — ISOSORBIDE MONONITRATE 120 MG: 60 TABLET, EXTENDED RELEASE ORAL at 08:12

## 2022-12-02 RX ADMIN — APIXABAN 5 MG: 2.5 TABLET, FILM COATED ORAL at 08:12

## 2022-12-02 RX ADMIN — INSULIN DETEMIR 5 UNITS: 100 INJECTION, SOLUTION SUBCUTANEOUS at 08:12

## 2022-12-02 RX ADMIN — GABAPENTIN 100 MG: 100 CAPSULE ORAL at 08:12

## 2022-12-02 NOTE — CARE UPDATE
12/02/22 1210   Patient Assessment/Suction   Level of Consciousness (AVPU) alert   Respiratory Effort Unlabored   Rhythm/Pattern, Respiratory no shortness of breath reported   PRE-TX-O2   O2 Device (Oxygen Therapy) nasal cannula w/ humidification   $ Is the patient on Low Flow Oxygen? Yes   Flow (L/min) 1   SpO2 95 %   Pulse Oximetry Type Intermittent   $ Pulse Oximetry - Multiple Charge Pulse Oximetry - Multiple

## 2022-12-02 NOTE — CARE UPDATE
12/01/22 2121   Patient Assessment/Suction   Level of Consciousness (AVPU) alert   Respiratory Effort Unlabored   Rhythm/Pattern, Respiratory unlabored   PRE-TX-O2   O2 Device (Oxygen Therapy) nasal cannula   $ Is the patient on Low Flow Oxygen? Yes   Flow (L/min) 3  (decreased to 2 LPM)   SpO2 98 %   Pulse Oximetry Type Intermittent   $ Pulse Oximetry - Multiple Charge Pulse Oximetry - Multiple   Pulse 72   Resp 20

## 2022-12-02 NOTE — PLAN OF CARE
Plan of care reviewed with pt at beginning of shift, verbalized understanding. Purposeful rounds completed on this pt throughout shift.  Pain monitored and covered per order, HD completed 12/1 pt has not had urine output my shift.  States she does still produce a small amt of urine.  Last BM was formed, per pt.  Repositions self, safety maintained.  Patient has remained free from fall/injury, no skin breakdown noted.  Side rails up x2, bed in locked and lowest position, call light kept within reach.  Needs attended to

## 2022-12-02 NOTE — PROGRESS NOTES
[facility] Hospital Medicine  Progress Note    Patient Name: Tabby Howard  MRN: [id number]  Patient Class: OP- Observation   Admission Date: 11/30/2022  Length of Stay: 0 days  Attending Physician: Jason Peres MD  Primary Care Provider: [facility]        Subjective:     Principal Problem:Intractable nausea and vomiting        HPI:  Tabby Howard is a 32 year old female with ESRD on HD, DM, HFpEF, GERD, suspected gastroparesis, sickle cell trait, HTN, who presented to the ED for evaluation of abdominal pain with nausea,vomiting and diarrhea. She has had multiple ED visits and admissions for the same complaint in the past, for intractable nausea and vomiting as well as for gastroparesis. She is a dialysis patient, with her last session performed Tuesday. She states she has had chest discomfort and abdominal bloating since then. She denies urinary complaint, blood in vomit or stool, fever, chills or other complaint.    ED work up included a CBC which showed chronic anemia and thrombocytopenia. CMP showed an elevated glucose of 587 with no evidence of acidosis. Liver enzymes are elevated from baseline today.  She was given a small fluid bolus in the ED, as well as one dose of morphine and several antiemetics. She reported relief after receiving droperidol. EKG was reviewed with no sign of acute ischemia or infarction. She was given IV insulin as well. Hospital Medicine consulted for admission and further management.      Overview/Hospital Course:  No notes on file    Interval History:  continues with severe abdominal and lower back pain.  Requiring IV hydromorphone every six hours.  Has nausea but no vomiting.  Bowel movement is formed.    Review of Systems   Constitutional:  Negative for chills and fever.   Respiratory:  Negative for cough and shortness of breath.    Cardiovascular:  Negative for chest pain and leg swelling.   Gastrointestinal:  Positive for  abdominal pain and nausea. Negative for vomiting.   Objective:     Vital Signs (Most Recent):  Temp: 98.3 °F (36.8 °C) (12/02/22 1129)  Pulse: 79 (12/02/22 1129)  Resp: 18 (12/02/22 1129)  BP: 112/67 (12/02/22 1129)  SpO2: (!) 93 % (12/02/22 1129)   Vital Signs (24h Range):  Temp:  [98 °F (36.7 °C)-99.5 °F (37.5 °C)] 98.3 °F (36.8 °C)  Pulse:  [72-85] 79  Resp:  [] 18  SpO2:  [93 %-100 %] 93 %  BP: (112-179)/() 112/67     Weight: 59 kg (129 lb 15.7 oz)  Body mass index is 23.77 kg/m².    Intake/Output Summary (Last 24 hours) at 12/2/2022 1154  Last data filed at 12/1/2022 1428  Gross per 24 hour   Intake 950 ml   Output 4200 ml   Net -3250 ml      Physical Exam  Constitutional:       General: She is not in acute distress.     Appearance: She is ill-appearing.      Comments: Lying on her left side, moaning and rocking her body back and forth.   Eyes:      General:         Right eye: No discharge.         Left eye: No discharge.   Neck:      Vascular: No JVD.   Cardiovascular:      Rate and Rhythm: Normal rate and regular rhythm.   Pulmonary:      Effort: Pulmonary effort is normal.      Breath sounds: Normal breath sounds.   Abdominal:      General: Abdomen is flat. Bowel sounds are normal. There is no distension.      Palpations: Abdomen is soft.      Tenderness: There is generalized abdominal tenderness.   Musculoskeletal:      Right lower leg: No edema.      Left lower leg: No edema.   Skin:     General: Skin is warm and moist.      Findings: No rash.   Neurological:      Mental Status: She is alert and oriented to person, place, and time.   Psychiatric:         Attention and Perception: Attention normal.         Mood and Affect: Mood and affect normal.         Speech: Speech normal.       Significant Labs: All pertinent labs within the past 24 hours have been reviewed.    Significant Imaging:  None      Assessment/Plan:      * Intractable nausea and vomiting  Improved.  She's receiving doses of IV  Zofran.  Will continue.      Intractable generalized abdominal pain  No improvement.  Continue analgesics, including IV hydromorphone 0.5 mg Q6 PRN.  Monitor for excessive sedation.      Anemia due to chronic kidney disease, on chronic dialysis    Patient's anemia is currently uncontrolled. She received two units of PRBC yesterday. Etiology likely d/t chronic kidney disease  Current CBC reviewed-   Lab Results   Component Value Date    HGB 9.1 (L) 12/02/2022    HCT 26.7 (L) 12/02/2022     Monitor serial CBC and transfuse if patient becomes hemodynamically unstable, symptomatic or H/H drops below 7/21.       Malnutrition of moderate degree  Nutrition consulted. Most recent weight and BMI monitored-                         Measurements:  Wt Readings from Last 1 Encounters:   11/30/22 59 kg (130 lb)   Body mass index is 23.78 kg/m².    Recommendations:      Patient has been screened and assessed by RD. RD will follow patient.      Hypertension  Chronic, controlled.  Latest blood pressure and vitals reviewed-   Temp:  [98 °F (36.7 °C)-99.5 °F (37.5 °C)]   Pulse:  [72-85]   Resp:  []   BP: (112-179)/()   SpO2:  [93 %-100 %] .   Home meds for hypertension were reviewed and noted below.   Hypertension Medications             amLODIPine (NORVASC) 10 MG tablet Take 1 tablet (10 mg total) by mouth once daily.    carvediloL (COREG) 25 MG tablet Take 1 tablet (25 mg total) by mouth 2 (two) times daily.    cloNIDine 0.2 mg/24 hr td ptwk (CATAPRES) 0.2 mg/24 hr Place 1 patch onto the skin every 7 days.    hydrALAZINE (APRESOLINE) 100 MG tablet Take 1 tablet (100 mg total) by mouth every 8 (eight) hours.    isosorbide mononitrate (IMDUR) 60 MG 24 hr tablet Take 2 tablets (120 mg total) by mouth once daily.          While in the hospital, will manage blood pressure as follows; Continue home antihypertensive regimen except for hydralazine and clonidine transdermal.    Will utilize p.r.n. blood pressure medication only if  patient's blood pressure greater than  180/110 and she develops symptoms such as worsening chest pain or shortness of breath.        Deep vein thrombosis (DVT) of non-extremity vein  Noted, on eliquis      ESRD (end stage renal disease) on dialysis  Consulting nephrology for HD needs.  Pt is on her routine schedule.      Seizure  Noted, chronic  Continue keppra  Seizure precautions      Gastroparesis - suspected, unconfirmed        Type 2 diabetes mellitus with chronic kidney disease on chronic dialysis, with long-term current use of insulin  Patient's FSGs are uncontrolled due to hyperglycemia on current medication regimen.  Last A1c reviewed-   Lab Results   Component Value Date    HGBA1C 6.2 08/24/2022     Most recent fingerstick glucose reviewed-   Recent Labs   Lab 12/01/22  1251 12/01/22  1619 12/01/22  2055 12/02/22  0743   POCTGLUCOSE 180* 235* 166* 372*     Current correctional scale  Medium  Maintain anti-hyperglycemic dose as follows-   Antihyperglycemics (From admission, onward)    Start     Stop Route Frequency Ordered    12/01/22 0900  insulin detemir U-100 pen 5 Units         -- SubQ Daily 11/30/22 2145 11/30/22 2240  insulin aspart U-100 pen 1-10 Units         -- SubQ Before meals & nightly PRN 11/30/22 2145        Hold Oral hypoglycemics while patient is in the hospital.    Gastritis  Noted, chronic  protonix        VTE Risk Mitigation (From admission, onward)         Ordered     apixaban tablet 2.5 mg  2 times daily         12/02/22 0915     Reason for No Pharmacological VTE Prophylaxis  Once        Question:  Reasons:  Answer:  Already adequately anticoagulated on oral Anticoagulants    11/30/22 2145     IP VTE HIGH RISK PATIENT  Once         11/30/22 2145     Place sequential compression device  Until discontinued         11/30/22 2145                Discharge Planning   TATIANA: 12/2/2022     Code Status: Full Code   Is the patient medically ready for discharge?:     Reason for patient still in  hospital (select all that apply): Patient trending condition and Treatment                     Jason Peres MD  Department of Hospital Medicine   [facility]

## 2022-12-02 NOTE — ASSESSMENT & PLAN NOTE
No improvement.  Continue analgesics, including IV hydromorphone 0.5 mg Q6 PRN.  Monitor for excessive sedation.

## 2022-12-02 NOTE — PLAN OF CARE
Problem: Adult Inpatient Plan of Care  Goal: Plan of Care Review  Outcome: Ongoing, Progressing   Lying on left side with HOB up 35. POC and medications reviewed with pt and verbalized understanding. Tele [id number] in use. HL in right wrist with DDI. No redness or swelling at site. Right upper arm  dialysis access with DDI. No redness or swelling at site. Will continue to monitor through out shift. SR up x 2. Call light in reach. Bed in lowest position with brakes locked.   Problem: Adult Inpatient Plan of Care  Goal: Optimal Comfort and Wellbeing  Outcome: Ongoing, Progressing   Q 2 hourly rounds made through out shift and IV site, pain and position monitored through out shift. PRN pains meds given per MD order.   Problem: Diabetes Comorbidity  Goal: Blood Glucose Level Within Targeted Range  Outcome: Ongoing, Progressing   CBGs monitored through out shift and PRN insulin coverage given per MD  order.

## 2022-12-02 NOTE — ASSESSMENT & PLAN NOTE
Patient's anemia is currently uncontrolled. She received two units of PRBC yesterday. Etiology likely d/t chronic kidney disease  Current CBC reviewed-   Lab Results   Component Value Date    HGB 9.1 (L) 12/02/2022    HCT 26.7 (L) 12/02/2022     Monitor serial CBC and transfuse if patient becomes hemodynamically unstable, symptomatic or H/H drops below 7/21.

## 2022-12-02 NOTE — SUBJECTIVE & OBJECTIVE
Interval History:  continues with severe abdominal and lower back pain.  Requiring IV hydromorphone every six hours.  Has nausea but no vomiting.  Bowel movement is formed.    Review of Systems   Constitutional:  Negative for chills and fever.   Respiratory:  Negative for cough and shortness of breath.    Cardiovascular:  Negative for chest pain and leg swelling.   Gastrointestinal:  Positive for abdominal pain and nausea. Negative for vomiting.   Objective:     Vital Signs (Most Recent):  Temp: 98.3 °F (36.8 °C) (12/02/22 1129)  Pulse: 79 (12/02/22 1129)  Resp: 18 (12/02/22 1129)  BP: 112/67 (12/02/22 1129)  SpO2: (!) 93 % (12/02/22 1129)   Vital Signs (24h Range):  Temp:  [98 °F (36.7 °C)-99.5 °F (37.5 °C)] 98.3 °F (36.8 °C)  Pulse:  [72-85] 79  Resp:  [] 18  SpO2:  [93 %-100 %] 93 %  BP: (112-179)/() 112/67     Weight: 59 kg (129 lb 15.7 oz)  Body mass index is 23.77 kg/m².    Intake/Output Summary (Last 24 hours) at 12/2/2022 1154  Last data filed at 12/1/2022 1428  Gross per 24 hour   Intake 950 ml   Output 4200 ml   Net -3250 ml      Physical Exam  Constitutional:       General: She is not in acute distress.     Appearance: She is ill-appearing.      Comments: Lying on her left side, moaning and rocking her body back and forth.   Eyes:      General:         Right eye: No discharge.         Left eye: No discharge.   Neck:      Vascular: No JVD.   Cardiovascular:      Rate and Rhythm: Normal rate and regular rhythm.   Pulmonary:      Effort: Pulmonary effort is normal.      Breath sounds: Normal breath sounds.   Abdominal:      General: Abdomen is flat. Bowel sounds are normal. There is no distension.      Palpations: Abdomen is soft.      Tenderness: There is generalized abdominal tenderness.   Musculoskeletal:      Right lower leg: No edema.      Left lower leg: No edema.   Skin:     General: Skin is warm and moist.      Findings: No rash.   Neurological:      Mental Status: She is alert and  oriented to person, place, and time.   Psychiatric:         Attention and Perception: Attention normal.         Mood and Affect: Mood and affect normal.         Speech: Speech normal.       Significant Labs: All pertinent labs within the past 24 hours have been reviewed.    Significant Imaging:  None

## 2022-12-02 NOTE — ASSESSMENT & PLAN NOTE
Patient's FSGs are uncontrolled due to hyperglycemia on current medication regimen.  Last A1c reviewed-   Lab Results   Component Value Date    HGBA1C 6.2 08/24/2022     Most recent fingerstick glucose reviewed-   Recent Labs   Lab 12/01/22  1251 12/01/22  1619 12/01/22 2055 12/02/22  0743   POCTGLUCOSE 180* 235* 166* 372*     Current correctional scale  Medium  Maintain anti-hyperglycemic dose as follows-   Antihyperglycemics (From admission, onward)    Start     Stop Route Frequency Ordered    12/01/22 0900  insulin detemir U-100 pen 5 Units         -- SubQ Daily 11/30/22 2145    11/30/22 2240  insulin aspart U-100 pen 1-10 Units         -- SubQ Before meals & nightly PRN 11/30/22 2145        Hold Oral hypoglycemics while patient is in the hospital.

## 2022-12-02 NOTE — CONSULTS
Nephrology Consult Note        Patient Name: Tabby Howard  MRN: [id number]    Patient Class: OP- Observation   Admission Date: 2022  Length of Stay: 0 days  Date of Service: 2022    Attending Physician: Jason Peres MD  Primary Care Provider: [facility]    Reason for Consult: esrd/anemia/htn/shpt/abdominal pain    SUBJECTIVE:     HPI: 32F with ESRD on HD, DM, HFpEF, GERD, suspected gastroparesis, sickle cell trait, HTN, presented to the ED for evaluation of abdominal pain with nausea,vomiting and diarrhea. She has had multiple ED visits and admissions for intractable nausea and vomiting as well as for gastroparesis. Her last HD session performed Tuesday. She has chest discomfort and abdominal bloating since then.     ED work up showed chronic anemia and thrombocytopenia, elevated glucose of 587 with no evidence of acidosis.  She was given a small fluid bolus in the ED, as well as one dose of morphine and several antiemetics. She reported relief after receiving droperidol. EKG was reviewed with no sign of acute ischemia or infarction. She was given IV insulin as well. Dialysis and blood transfusion was emergently performed.     VSS. HD tomorrow per schedule. Can be dc if stable.    Past Medical History:   Diagnosis Date    CKD (chronic kidney disease), stage IV 2022    Diabetes mellitus due to underlying condition with unspecified complications 2022    Gastroparesis 2022    Heart failure with preserved ejection fraction 2022    EF 55% on 3/22    History of gastroesophageal reflux (GERD)     History of supraventricular tachycardia     Hyperkalemia 2022    Hypertensive emergency 2022    Sickle cell trait 2022    Type 2 diabetes mellitus      Past Surgical History:   Procedure Laterality Date     SECTION      x 3    COLONOSCOPY      COLONOSCOPY N/A 2022    Procedure: COLONOSCOPY;  Surgeon: Jagdeep Cedeno MD;  Location:  [facility] ENDO;  Service: Endoscopy;  Laterality: N/A;    ESOPHAGOGASTRODUODENOSCOPY N/A 10/18/2019    Procedure: ESOPHAGOGASTRODUODENOSCOPY (EGD);  Surgeon: Gianluca Mendez MD;  Location: [facility] ENDO;  Service: Endoscopy;  Laterality: N/A;    ESOPHAGOGASTRODUODENOSCOPY N/A 08/24/2022    Procedure: EGD (ESOPHAGOGASTRODUODENOSCOPY);  Surgeon: Micky Paredes III, MD;  Location: [facility] ENDO;  Service: Endoscopy;  Laterality: N/A;    LAPAROSCOPIC CHOLECYSTECTOMY N/A 07/30/2022    Procedure: CHOLECYSTECTOMY, LAPAROSCOPIC;  Surgeon: Grey Perez MD;  Location: [facility] OR;  Service: General;  Laterality: N/A;    PLACEMENT OF DUAL-LUMEN VASCULAR CATHETER Left 07/12/2022    Procedure: INSERTION-CATHETER-JOSEPH;  Surgeon: Dionte Gan MD;  Location: [facility] OR;  Service: General;  Laterality: Left;    PLACEMENT OF DUAL-LUMEN VASCULAR CATHETER Right 07/26/2022    Procedure: INSERTION-CATHETER-Hemosplit;  Surgeon: Dionte Gan MD;  Location: [facility] OR;  Service: General;  Laterality: Right;     Family History   Problem Relation Age of Onset    Diabetes Mother     Diabetes Father      Social History     Tobacco Use    Smoking status: Never    Smokeless tobacco: Never   Substance Use Topics    Alcohol use: Not Currently    Drug use: No       Review of patient's allergies indicates:   Allergen Reactions    Penicillins Hives       Outpatient meds:  No current facility-administered medications on file prior to encounter.     Current Outpatient Medications on File Prior to Encounter   Medication Sig Dispense Refill    amLODIPine (NORVASC) 10 MG tablet Take 1 tablet (10 mg total) by mouth once daily. 30 tablet 11    apixaban (ELIQUIS) 5 mg Tab Take 1 tablet (5 mg total) by mouth 2 (two) times daily. 60 tablet 2    carvediloL (COREG) 25 MG tablet Take 1 tablet (25 mg total) by mouth 2 (two) times daily. 60 tablet 2    cloNIDine 0.2 mg/24 hr td ptwk (CATAPRES) 0.2 mg/24 hr Place 1 patch onto the skin every 7 days. 4 patch 2    FLUoxetine  "20 MG capsule Take 1 capsule (20 mg total) by mouth once daily. 30 capsule 11    gabapentin (NEURONTIN) 100 MG capsule Take 1 capsule (100 mg total) by mouth 2 (two) times daily. 90 capsule 2    insulin aspart U-100 (NOVOLOG) 100 unit/mL (3 mL) InPn pen Inject 1-10 Units into the skin before meals and at bedtime as needed (Hyperglycemia). Max daily dose 40 units. 3 mL 6    insulin detemir U-100 (LEVEMIR FLEXTOUCH) 100 unit/mL (3 mL) SubQ InPn pen Inject 5 Units into the skin once daily. Discard pen after 42 days. 6 mL 2    insulin glargine (LANTUS) 100 unit/mL injection Inject 5 Units into the skin every morning.      isosorbide mononitrate (IMDUR) 60 MG 24 hr tablet Take 2 tablets (120 mg total) by mouth once daily. 30 tablet 11    levETIRAcetam (KEPPRA) 500 MG Tab Take 1 tablet (500 mg total) by mouth 2 (two) times daily. 60 tablet 11    methocarbamoL (ROBAXIN) 500 MG Tab Take 1 tablet (500 mg total) by mouth 3 (three) times daily. 90 tablet 1    ondansetron (ZOFRAN) 4 MG tablet Take 1 tablet (4 mg total) by mouth every 8 (eight) hours as needed for Nausea. 60 tablet 2    pantoprazole (PROTONIX) 40 MG tablet Take 40 mg by mouth once daily.      pen needle, diabetic (BD ULTRA-FINE MAGALIE PEN NEEDLE) 32 gauge x 5/32" Ndle Inject into the skin 5 times per day with insulin 100 each 12    albuterol (PROVENTIL/VENTOLIN HFA) 90 mcg/actuation inhaler Inhale 2 puffs into the lungs every 6 (six) hours as needed for Wheezing. Rescue 18 g 3    hydrALAZINE (APRESOLINE) 100 MG tablet Take 1 tablet (100 mg total) by mouth every 8 (eight) hours. 90 tablet 2    [DISCONTINUED] atenoloL (TENORMIN) 50 MG tablet Take 1 tablet (50 mg total) by mouth every other day. 45 tablet 3    [DISCONTINUED] omeprazole (PRILOSEC) 20 MG capsule Take 2 capsules (40 mg total) by mouth once daily. for 10 days 20 capsule 0    [DISCONTINUED] torsemide (DEMADEX) 20 MG Tab Take 1 tablet (20 mg total) by mouth 2 (two) times a day. (Patient taking differently: " Take 20 mg by mouth once daily.) 60 tablet 1       Scheduled meds:   sodium chloride 0.9%   Intravenous Once    amLODIPine  10 mg Oral Daily    apixaban  2.5 mg Oral BID    carvediloL  25 mg Oral BID    epoetin teresa-epbx  10,000 Units Subcutaneous Every Tues, Thurs, Sat    FLUoxetine  20 mg Oral Daily    gabapentin  100 mg Oral BID    insulin detemir U-100  5 Units Subcutaneous Daily    isosorbide mononitrate  120 mg Oral Daily    levETIRAcetam  500 mg Oral BID    methocarbamoL  500 mg Oral TID    mupirocin   Nasal BID    pantoprazole  40 mg Oral Daily    senna-docusate 8.6-50 mg  1 tablet Oral BID       Infusions:      PRN meds:  sodium chloride, acetaminophen, aluminum-magnesium hydroxide-simethicone, dextrose 10%, dextrose 10%, glucagon (human recombinant), glucose, glucose, HYDROmorphone, insulin aspart U-100, magnesium oxide, magnesium oxide, melatonin, naloxone, ondansetron, potassium bicarbonate, potassium bicarbonate, potassium bicarbonate, potassium, sodium phosphates, potassium, sodium phosphates, potassium, sodium phosphates, prochlorperazine, simethicone, sodium chloride 0.9%, sodium chloride 0.9%    Review of Systems:  Review of Systems   Constitutional:  Positive for malaise/fatigue. Negative for chills, fever and weight loss.   HENT:  Negative for hearing loss and nosebleeds.    Eyes:  Negative for blurred vision, double vision and photophobia.   Respiratory:  Negative for cough, shortness of breath and wheezing.    Cardiovascular:  Positive for chest pain. Negative for palpitations and leg swelling.   Gastrointestinal:  Positive for abdominal pain and nausea. Negative for constipation, diarrhea, heartburn and vomiting.   Genitourinary:  Negative for dysuria, frequency and urgency.   Musculoskeletal:  Negative for falls, joint pain and myalgias.   Skin:  Negative for itching and rash.   Neurological:  Positive for weakness. Negative for dizziness, speech change, focal weakness, loss of consciousness  and headaches.   Endo/Heme/Allergies:  Does not bruise/bleed easily.   Psychiatric/Behavioral:  Negative for depression and substance abuse. The patient is not nervous/anxious.      OBJECTIVE:     Vital Signs and IO:  Temp:  [98 °F (36.7 °C)-99.5 °F (37.5 °C)]   Pulse:  [72-85]   Resp:  []   BP: (112-179)/()   SpO2:  [93 %-100 %]   I/O last 3 completed shifts:  In: 2143 [I.V.:250; Blood:693; Other:700; IV Piggyback:500]  Out: 4200 [Other:4200]  Wt Readings from Last 5 Encounters:   12/02/22 59 kg (129 lb 15.7 oz)   11/19/22 56 kg (123 lb 7.3 oz)   11/20/22 56 kg (123 lb 7.3 oz)   11/12/22 57.1 kg (125 lb 14.1 oz)   11/06/22 59.9 kg (132 lb)     Body mass index is 23.77 kg/m².    Physical Exam  Constitutional:       General: She is not in acute distress.     Appearance: She is well-developed. She is not diaphoretic.   HENT:      Head: Normocephalic and atraumatic.      Mouth/Throat:      Mouth: Mucous membranes are moist.   Eyes:      General: No scleral icterus.     Pupils: Pupils are equal, round, and reactive to light.   Cardiovascular:      Rate and Rhythm: Normal rate and regular rhythm.   Pulmonary:      Effort: Pulmonary effort is normal. No respiratory distress.      Breath sounds: No stridor.   Abdominal:      General: There is no distension.      Palpations: Abdomen is soft.   Musculoskeletal:         General: No deformity. Normal range of motion.      Cervical back: Neck supple.   Skin:     General: Skin is warm and dry.      Findings: No erythema or rash.   Neurological:      Mental Status: She is alert and oriented to person, place, and time.      Cranial Nerves: No cranial nerve deficit.   Psychiatric:         Behavior: Behavior normal.       Laboratory:  Recent Labs   Lab 11/30/22  1848 12/01/22  0542 12/02/22  0537   * 136 138   K 3.7 3.6 4.0   CL 92* 96 104   CO2 23 26 25   BUN 35* 40* 23*   CREATININE 4.5* 4.8* 3.6*   * 305* 373*         Recent Labs   Lab 11/30/22  7137  12/01/22  0542 12/02/22  0537   CALCIUM 10.1 8.8 8.7   ALBUMIN 3.8 2.8* 2.9*   PHOS  --  5.2* 3.6   MG  --  1.8 1.9         Recent Labs   Lab 07/08/22  0516   PTH, Intact 289.8 H         Recent Labs   Lab 11/30/22 2012 11/30/22  2135 12/01/22  0049 12/01/22  0118 12/01/22  0230 12/01/22  0609 12/01/22  1251 12/01/22  1619 12/01/22  2055 12/02/22  0743   POCTGLUCOSE >500* 473* 374* 381* 348* 306* 180* 235* 166* 372*         Recent Labs   Lab 05/15/22  1954 07/22/22  1653 08/24/22  0218   Hemoglobin A1C 5.8 H 6.0 H 6.2         Recent Labs   Lab 11/30/22  1848 12/01/22  0542 12/02/22  0537   WBC 7.62 4.88 5.53   HGB 7.7* 6.3* 9.1*   HCT 22.4* 18.4* 26.7*   * 98* 105*   MCV 80* 80* 82   MCHC 34.4 34.2 34.1   MONO 3.7*  0.3 9.2  0.5 9.0  0.5         Recent Labs   Lab 11/30/22  1848 12/01/22  0542 12/02/22  0537   BILITOT 2.8* 1.6* 1.5*   PROT 8.3 6.2 6.4   ALBUMIN 3.8 2.8* 2.9*   ALKPHOS 477* 354* 321*   * 89* 79*   AST 59* 37 33         Recent Labs   Lab 10/16/22  1736 10/24/22  0107 11/19/22  1210   Color, UA Yellow Yellow Yellow   Appearance, UA Hazy A Clear Clear   pH, UA 7.0 8.0 8.0   Specific [place], UA 1.020 1.020 1.025   Protein, UA 4+ 4+ 4+   Glucose, UA 4+ A 4+ A 4+ A   Ketones, UA Trace A Negative Negative   Urobilinogen, UA Negative Negative Negative   Bilirubin (UA) Negative Negative 1+ A   Occult Blood UA 2+ A 2+ A 2+ A   Nitrite, UA Negative Negative Negative   RBC, UA 12 H 41 H 38 H   WBC, UA >100 H 24 H 25 H   Bacteria Negative Negative Negative   Hyaline Casts, UA 7 A 6 A 2 A         Recent Labs   Lab 10/04/22  0901 10/16/22  1643 11/30/22 1942   POC PH 7.378 7.398 7.434   POC PCO2 54.4 H 37.6 39.8   POC HCO3 32.0 H 23.2 L 26.6   POC PO2 26 L 55 52   POC SATURATED O2 44 L 88 L 87 L   POC BE 7 -2 2   Sample VENOUS VENOUS VENOUS         Microbiology Results (last 7 days)       ** No results found for the last 168 hours. **            ASSESSMENT/PLAN:     ESRD on HD TTS via  AVF  Continue current dialysis prescription.  Next HD per schedule.  Renal diet - low K, low phos.  No IVs or BP checks on access and/or non-dominant arm.    Anemia of CKD  Hgb and HCT are acceptable. Monitor for now.  Will provide SHELLEY and/or IV iron PRN.  Transfused 2 PRBC on 12/1.    MBD / Secondary HPT  Ca, Phos, PTH and vitamin D levels are acceptable.   Phos binders, vitamin D and analogues, calcimimetics will be given as needed.    HTN  BP seem controlled.   Tolerate asymptomatic HTN up to -160.  Continue home meds.  Low sodium diet.    Thank you for allowing us to participate in the care of your patient!   We will follow the patient and provide recommendations as needed.    Patient care time was spent personally by me on the following activities:     Obtaining a history.  Examination of patient.  Providing medical care at the patients bedside.  Developing a treatment plan with patient or surrogate and bedside caregivers.  Ordering and reviewing laboratory studies, radiographic studies, pulse oximetry.  Ordering and performing treatments and interventions.  Evaluation of patient's response to treatment.  Discussions with consultants while on the unit and immediately available to the patient.  Re-evaluation of the patient's condition.  Documentation in the medical record.     Mando Benitez MD    [facility]  [address] [place] [address]  JEANMARIE Conn[place] [address]    [phone] - tel  [phone] - fax    12/2/2022

## 2022-12-02 NOTE — ASSESSMENT & PLAN NOTE
Chronic, controlled.  Latest blood pressure and vitals reviewed-   Temp:  [98 °F (36.7 °C)-99.5 °F (37.5 °C)]   Pulse:  [72-85]   Resp:  []   BP: (112-179)/()   SpO2:  [93 %-100 %] .   Home meds for hypertension were reviewed and noted below.   Hypertension Medications             amLODIPine (NORVASC) 10 MG tablet Take 1 tablet (10 mg total) by mouth once daily.    carvediloL (COREG) 25 MG tablet Take 1 tablet (25 mg total) by mouth 2 (two) times daily.    cloNIDine 0.2 mg/24 hr td ptwk (CATAPRES) 0.2 mg/24 hr Place 1 patch onto the skin every 7 days.    hydrALAZINE (APRESOLINE) 100 MG tablet Take 1 tablet (100 mg total) by mouth every 8 (eight) hours.    isosorbide mononitrate (IMDUR) 60 MG 24 hr tablet Take 2 tablets (120 mg total) by mouth once daily.          While in the hospital, will manage blood pressure as follows; Continue home antihypertensive regimen except for hydralazine and clonidine transdermal.    Will utilize p.r.n. blood pressure medication only if patient's blood pressure greater than  180/110 and she develops symptoms such as worsening chest pain or shortness of breath.

## 2022-12-02 NOTE — PLAN OF CARE
[facility]  Initial Discharge Assessment       Primary Care Provider: [facility]    Admission Diagnosis: Epigastric pain [R10.13]  Chest pain [R07.9]  Intractable nausea and vomiting [R11.2]  Nausea and vomiting, unspecified vomiting type [R11.2]    Admission Date: 11/30/2022  Expected Discharge Date: 12/2/2022    Discharge Barriers Identified: None    Payor: MEDICAID / Plan: HEALTHY BLUE [place] / Product Type: Managed Medicaid /     Extended Emergency Contact Information  Primary Emergency Contact: Garcia Howard  Mobile Phone: [phone]  Relation: Father  Preferred language: English   needed? No  Secondary Emergency Contact: Tanya Howard  Mobile Phone: [phone]  Relation: Step parent  Preferred language: English   needed? No    Discharge Plan A: Home with family  Discharge Plan B: Home      [facility]  [address]  [facility] [address]  Phone: [phone] Fax: [phone]      Completed DC assessment with pt at bedside. Verified information on facesheet as correct. Reports that she lives with dependent children at listed address but will be discharging to her father's address in [place] ([address] Verified PCP as [facility] (reports seen at clinic last month). Pharm is [facility] (closed on weekend so back up pharmacy is [facility]). On eliquis. Denies hh/hd. Does dialysis at [facility] T,Th,S 11 AM (last dialysis day was Tues). Reports that her father provides transportation to dialysis and other apts and will also provide transportation home upon DC. Reports being recently admitted at [facility] for similar reason- reports following up and taking meds as prescribed. Verified insurance on file. DME- glucometer and RW. Reports using RW all the time and requires assistance with bathing. DC plan is home to her father's address.     Initial Assessment (most recent)        Adult Discharge Assessment - 12/02/22 1205          Discharge Assessment    Assessment Type Discharge Planning Assessment     Confirmed/corrected address, phone number and insurance Yes     Confirmed Demographics Correct on Facesheet     Source of Information patient     Does patient/caregiver understand observation status Yes     Communicated TATIANA with patient/caregiver Yes     Reason For Admission Nausea and vomiting     Lives With parent(s)     Facility Arrived From: home     Do you expect to return to your current living situation? No     Do you have help at home or someone to help you manage your care at home? Yes     Who are your caregiver(s) and their phone number(s)? father and stepmother     Prior to hospitilization cognitive status: Alert/Oriented     Current cognitive status: Alert/Oriented     Walking or Climbing Stairs Difficulty ambulation difficulty, requires equipment     Dressing/Bathing Difficulty bathing difficulty, assistance 1 person     Equipment Currently Used at Home walker, rolling;glucometer     Readmission within 30 days? Yes     Patient currently being followed by outpatient case management? No     Do you currently have service(s) that help you manage your care at home? No     Do you take prescription medications? Yes     Do you have prescription coverage? Yes     Do you have any problems affording any of your prescribed medications? No     Is the patient taking medications as prescribed? yes     Who is going to help you get home at discharge? father     How do you get to doctors appointments? family or friend will provide     Are you on dialysis? No     Do you take coumadin? No     Discharge Plan A Home with family     Discharge Plan B Home     DME Needed Upon Discharge  none     Discharge Plan discussed with: Patient     Discharge Barriers Identified None

## 2022-12-03 LAB
ALBUMIN SERPL BCP-MCNC: 2.8 G/DL (ref 3.5–5.2)
ALP SERPL-CCNC: 316 U/L (ref 55–135)
ALT SERPL W/O P-5'-P-CCNC: 62 U/L (ref 10–44)
ANION GAP SERPL CALC-SCNC: 9 MMOL/L (ref 8–16)
AST SERPL-CCNC: 24 U/L (ref 10–40)
BASOPHILS # BLD AUTO: 0.03 K/UL (ref 0–0.2)
BASOPHILS NFR BLD: 0.6 % (ref 0–1.9)
BILIRUB SERPL-MCNC: 1 MG/DL (ref 0.1–1)
BUN SERPL-MCNC: 31 MG/DL (ref 6–20)
CALCIUM SERPL-MCNC: 8.6 MG/DL (ref 8.7–10.5)
CHLORIDE SERPL-SCNC: 100 MMOL/L (ref 95–110)
CO2 SERPL-SCNC: 24 MMOL/L (ref 23–29)
CREAT SERPL-MCNC: 4.8 MG/DL (ref 0.5–1.4)
DIFFERENTIAL METHOD: ABNORMAL
EOSINOPHIL # BLD AUTO: 0.2 K/UL (ref 0–0.5)
EOSINOPHIL NFR BLD: 4 % (ref 0–8)
ERYTHROCYTE [DISTWIDTH] IN BLOOD BY AUTOMATED COUNT: 14.8 % (ref 11.5–14.5)
EST. GFR  (NO RACE VARIABLE): 12 ML/MIN/1.73 M^2
GLUCOSE SERPL-MCNC: 412 MG/DL (ref 70–110)
HCT VFR BLD AUTO: 25.3 % (ref 37–48.5)
HGB BLD-MCNC: 8.7 G/DL (ref 12–16)
IMM GRANULOCYTES # BLD AUTO: 0.05 K/UL (ref 0–0.04)
IMM GRANULOCYTES NFR BLD AUTO: 0.9 % (ref 0–0.5)
LYMPHOCYTES # BLD AUTO: 0.9 K/UL (ref 1–4.8)
LYMPHOCYTES NFR BLD: 17.6 % (ref 18–48)
MAGNESIUM SERPL-MCNC: 2.1 MG/DL (ref 1.6–2.6)
MCH RBC QN AUTO: 28.1 PG (ref 27–31)
MCHC RBC AUTO-ENTMCNC: 34.4 G/DL (ref 32–36)
MCV RBC AUTO: 82 FL (ref 82–98)
MONOCYTES # BLD AUTO: 0.5 K/UL (ref 0.3–1)
MONOCYTES NFR BLD: 9.6 % (ref 4–15)
NEUTROPHILS # BLD AUTO: 3.6 K/UL (ref 1.8–7.7)
NEUTROPHILS NFR BLD: 67.3 % (ref 38–73)
NRBC BLD-RTO: 0 /100 WBC
PHOSPHATE SERPL-MCNC: 3.7 MG/DL (ref 2.7–4.5)
PLATELET # BLD AUTO: 103 K/UL (ref 150–450)
PMV BLD AUTO: 9.8 FL (ref 9.2–12.9)
POCT GLUCOSE: 158 MG/DL (ref 70–110)
POCT GLUCOSE: 269 MG/DL (ref 70–110)
POCT GLUCOSE: 276 MG/DL (ref 70–110)
POCT GLUCOSE: 372 MG/DL (ref 70–110)
POCT GLUCOSE: 413 MG/DL (ref 70–110)
POCT GLUCOSE: 91 MG/DL (ref 70–110)
POTASSIUM SERPL-SCNC: 4.7 MMOL/L (ref 3.5–5.1)
PROT SERPL-MCNC: 6.2 G/DL (ref 6–8.4)
RBC # BLD AUTO: 3.1 M/UL (ref 4–5.4)
SODIUM SERPL-SCNC: 133 MMOL/L (ref 136–145)
WBC # BLD AUTO: 5.29 K/UL (ref 3.9–12.7)

## 2022-12-03 PROCEDURE — 83735 ASSAY OF MAGNESIUM: CPT | Performed by: NURSE PRACTITIONER

## 2022-12-03 PROCEDURE — 63600175 PHARM REV CODE 636 W HCPCS: Performed by: HOSPITALIST

## 2022-12-03 PROCEDURE — 25000003 PHARM REV CODE 250: Performed by: HOSPITALIST

## 2022-12-03 PROCEDURE — 85025 COMPLETE CBC W/AUTO DIFF WBC: CPT | Performed by: NURSE PRACTITIONER

## 2022-12-03 PROCEDURE — 80100014 HC HEMODIALYSIS 1:1

## 2022-12-03 PROCEDURE — 63600175 PHARM REV CODE 636 W HCPCS: Performed by: NURSE PRACTITIONER

## 2022-12-03 PROCEDURE — 63600175 PHARM REV CODE 636 W HCPCS: Performed by: INTERNAL MEDICINE

## 2022-12-03 PROCEDURE — 12000002 HC ACUTE/MED SURGE SEMI-PRIVATE ROOM

## 2022-12-03 PROCEDURE — G0378 HOSPITAL OBSERVATION PER HR: HCPCS

## 2022-12-03 PROCEDURE — 80053 COMPREHEN METABOLIC PANEL: CPT | Performed by: NURSE PRACTITIONER

## 2022-12-03 PROCEDURE — 84100 ASSAY OF PHOSPHORUS: CPT | Performed by: NURSE PRACTITIONER

## 2022-12-03 PROCEDURE — 25000003 PHARM REV CODE 250: Performed by: NURSE PRACTITIONER

## 2022-12-03 PROCEDURE — 36415 COLL VENOUS BLD VENIPUNCTURE: CPT | Performed by: NURSE PRACTITIONER

## 2022-12-03 PROCEDURE — 27000221 HC OXYGEN, UP TO 24 HOURS

## 2022-12-03 PROCEDURE — 94761 N-INVAS EAR/PLS OXIMETRY MLT: CPT

## 2022-12-03 RX ADMIN — GABAPENTIN 100 MG: 100 CAPSULE ORAL at 08:12

## 2022-12-03 RX ADMIN — INSULIN ASPART 10 UNITS: 100 INJECTION, SOLUTION INTRAVENOUS; SUBCUTANEOUS at 09:12

## 2022-12-03 RX ADMIN — INSULIN DETEMIR 5 UNITS: 100 INJECTION, SOLUTION SUBCUTANEOUS at 09:12

## 2022-12-03 RX ADMIN — METHOCARBAMOL 500 MG: 500 TABLET ORAL at 08:12

## 2022-12-03 RX ADMIN — AMLODIPINE BESYLATE 10 MG: 5 TABLET ORAL at 08:12

## 2022-12-03 RX ADMIN — APIXABAN 2.5 MG: 2.5 TABLET, FILM COATED ORAL at 08:12

## 2022-12-03 RX ADMIN — FLUOXETINE 20 MG: 20 CAPSULE ORAL at 08:12

## 2022-12-03 RX ADMIN — ISOSORBIDE MONONITRATE 120 MG: 60 TABLET, EXTENDED RELEASE ORAL at 08:12

## 2022-12-03 RX ADMIN — SENNOSIDES AND DOCUSATE SODIUM 1 TABLET: 50; 8.6 TABLET ORAL at 08:12

## 2022-12-03 RX ADMIN — MUPIROCIN: 20 OINTMENT TOPICAL at 09:12

## 2022-12-03 RX ADMIN — INSULIN ASPART 1 UNITS: 100 INJECTION, SOLUTION INTRAVENOUS; SUBCUTANEOUS at 08:12

## 2022-12-03 RX ADMIN — LEVETIRACETAM 500 MG: 250 TABLET, FILM COATED ORAL at 08:12

## 2022-12-03 RX ADMIN — INSULIN ASPART 6 UNITS: 100 INJECTION, SOLUTION INTRAVENOUS; SUBCUTANEOUS at 11:12

## 2022-12-03 RX ADMIN — HYDROMORPHONE HYDROCHLORIDE 0.5 MG: 2 INJECTION, SOLUTION INTRAMUSCULAR; INTRAVENOUS; SUBCUTANEOUS at 02:12

## 2022-12-03 RX ADMIN — CARVEDILOL 25 MG: 25 TABLET, FILM COATED ORAL at 08:12

## 2022-12-03 RX ADMIN — PANTOPRAZOLE SODIUM 40 MG: 40 TABLET, DELAYED RELEASE ORAL at 08:12

## 2022-12-03 RX ADMIN — HYDROMORPHONE HYDROCHLORIDE 0.5 MG: 2 INJECTION, SOLUTION INTRAMUSCULAR; INTRAVENOUS; SUBCUTANEOUS at 12:12

## 2022-12-03 RX ADMIN — ONDANSETRON 8 MG: 2 INJECTION INTRAMUSCULAR; INTRAVENOUS at 12:12

## 2022-12-03 RX ADMIN — METHOCARBAMOL 500 MG: 500 TABLET ORAL at 03:12

## 2022-12-03 RX ADMIN — HYDROMORPHONE HYDROCHLORIDE 0.5 MG: 2 INJECTION, SOLUTION INTRAMUSCULAR; INTRAVENOUS; SUBCUTANEOUS at 06:12

## 2022-12-03 RX ADMIN — MUPIROCIN: 20 OINTMENT TOPICAL at 08:12

## 2022-12-03 RX ADMIN — HYDROMORPHONE HYDROCHLORIDE 0.5 MG: 2 INJECTION, SOLUTION INTRAMUSCULAR; INTRAVENOUS; SUBCUTANEOUS at 08:12

## 2022-12-03 RX ADMIN — EPOETIN ALFA-EPBX 10000 UNITS: 10000 INJECTION, SOLUTION INTRAVENOUS; SUBCUTANEOUS at 05:12

## 2022-12-03 NOTE — CONSULTS
Nephrology Consult Note        Patient Name: Tabby Howard  MRN: [id number]    Patient Class: OP- Observation   Admission Date: 2022  Length of Stay: 0 days  Date of Service: 12/3/2022    Attending Physician: Jason Peres MD  Primary Care Provider: [facility]    Reason for Consult: esrd/anemia/htn/shpt/abdominal pain    SUBJECTIVE:     HPI: 32F with ESRD on HD, DM, HFpEF, GERD, suspected gastroparesis, sickle cell trait, HTN, presented to the ED for evaluation of abdominal pain with nausea,vomiting and diarrhea. She has had multiple ED visits and admissions for intractable nausea and vomiting as well as for gastroparesis. Her last HD session performed Tuesday. She has chest discomfort and abdominal bloating since then.     ED work up showed chronic anemia and thrombocytopenia, elevated glucose of 587 with no evidence of acidosis.  She was given a small fluid bolus in the ED, as well as one dose of morphine and several antiemetics. She reported relief after receiving droperidol. EKG was reviewed with no sign of acute ischemia or infarction. She was given IV insulin as well. Dialysis and blood transfusion was emergently performed.     VSS. HD tomorrow per schedule. Can be dc if stable.  12/3 HD today, had CLD- abd pain after- getting pain meds    Past Medical History:   Diagnosis Date    CKD (chronic kidney disease), stage IV 2022    Diabetes mellitus due to underlying condition with unspecified complications 2022    Gastroparesis 2022    Heart failure with preserved ejection fraction 2022    EF 55% on 3/22    History of gastroesophageal reflux (GERD)     History of supraventricular tachycardia     Hyperkalemia 2022    Hypertensive emergency 2022    Sickle cell trait 2022    Type 2 diabetes mellitus      Past Surgical History:   Procedure Laterality Date     SECTION      x 3    COLONOSCOPY      COLONOSCOPY N/A 2022     Procedure: COLONOSCOPY;  Surgeon: Jagdeep Cedeno MD;  Location: [facility] ENDO;  Service: Endoscopy;  Laterality: N/A;    ESOPHAGOGASTRODUODENOSCOPY N/A 10/18/2019    Procedure: ESOPHAGOGASTRODUODENOSCOPY (EGD);  Surgeon: Gianluca Mendez MD;  Location: [facility] ENDO;  Service: Endoscopy;  Laterality: N/A;    ESOPHAGOGASTRODUODENOSCOPY N/A 08/24/2022    Procedure: EGD (ESOPHAGOGASTRODUODENOSCOPY);  Surgeon: Micky Paredes III, MD;  Location: [facility] ENDO;  Service: Endoscopy;  Laterality: N/A;    LAPAROSCOPIC CHOLECYSTECTOMY N/A 07/30/2022    Procedure: CHOLECYSTECTOMY, LAPAROSCOPIC;  Surgeon: Grey Perez MD;  Location: [facility] OR;  Service: General;  Laterality: N/A;    PLACEMENT OF DUAL-LUMEN VASCULAR CATHETER Left 07/12/2022    Procedure: INSERTION-CATHETER-JOSEPH;  Surgeon: Dionte Gan MD;  Location: [facility] OR;  Service: General;  Laterality: Left;    PLACEMENT OF DUAL-LUMEN VASCULAR CATHETER Right 07/26/2022    Procedure: INSERTION-CATHETER-Hemosplit;  Surgeon: Dionte Gan MD;  Location: [facility] OR;  Service: General;  Laterality: Right;     Family History   Problem Relation Age of Onset    Diabetes Mother     Diabetes Father      Social History     Tobacco Use    Smoking status: Never    Smokeless tobacco: Never   Substance Use Topics    Alcohol use: Not Currently    Drug use: No       Review of patient's allergies indicates:   Allergen Reactions    Penicillins Hives       Outpatient meds:  No current facility-administered medications on file prior to encounter.     Current Outpatient Medications on File Prior to Encounter   Medication Sig Dispense Refill    amLODIPine (NORVASC) 10 MG tablet Take 1 tablet (10 mg total) by mouth once daily. 30 tablet 11    apixaban (ELIQUIS) 5 mg Tab Take 1 tablet (5 mg total) by mouth 2 (two) times daily. 60 tablet 2    carvediloL (COREG) 25 MG tablet Take 1 tablet (25 mg total) by mouth 2 (two) times daily. 60 tablet 2    cloNIDine 0.2 mg/24 hr td ptwk (CATAPRES) 0.2 mg/24 hr  "Place 1 patch onto the skin every 7 days. 4 patch 2    FLUoxetine 20 MG capsule Take 1 capsule (20 mg total) by mouth once daily. 30 capsule 11    gabapentin (NEURONTIN) 100 MG capsule Take 1 capsule (100 mg total) by mouth 2 (two) times daily. 90 capsule 2    insulin aspart U-100 (NOVOLOG) 100 unit/mL (3 mL) InPn pen Inject 1-10 Units into the skin before meals and at bedtime as needed (Hyperglycemia). Max daily dose 40 units. 3 mL 6    insulin detemir U-100 (LEVEMIR FLEXTOUCH) 100 unit/mL (3 mL) SubQ InPn pen Inject 5 Units into the skin once daily. Discard pen after 42 days. 6 mL 2    insulin glargine (LANTUS) 100 unit/mL injection Inject 5 Units into the skin every morning.      isosorbide mononitrate (IMDUR) 60 MG 24 hr tablet Take 2 tablets (120 mg total) by mouth once daily. 30 tablet 11    levETIRAcetam (KEPPRA) 500 MG Tab Take 1 tablet (500 mg total) by mouth 2 (two) times daily. 60 tablet 11    methocarbamoL (ROBAXIN) 500 MG Tab Take 1 tablet (500 mg total) by mouth 3 (three) times daily. 90 tablet 1    ondansetron (ZOFRAN) 4 MG tablet Take 1 tablet (4 mg total) by mouth every 8 (eight) hours as needed for Nausea. 60 tablet 2    pantoprazole (PROTONIX) 40 MG tablet Take 40 mg by mouth once daily.      pen needle, diabetic (BD ULTRA-FINE MAGALIE PEN NEEDLE) 32 gauge x 5/32" Ndle Inject into the skin 5 times per day with insulin 100 each 12    albuterol (PROVENTIL/VENTOLIN HFA) 90 mcg/actuation inhaler Inhale 2 puffs into the lungs every 6 (six) hours as needed for Wheezing. Rescue 18 g 3    hydrALAZINE (APRESOLINE) 100 MG tablet Take 1 tablet (100 mg total) by mouth every 8 (eight) hours. 90 tablet 2    [DISCONTINUED] atenoloL (TENORMIN) 50 MG tablet Take 1 tablet (50 mg total) by mouth every other day. 45 tablet 3    [DISCONTINUED] omeprazole (PRILOSEC) 20 MG capsule Take 2 capsules (40 mg total) by mouth once daily. for 10 days 20 capsule 0    [DISCONTINUED] torsemide (DEMADEX) 20 MG Tab Take 1 tablet (20 mg " total) by mouth 2 (two) times a day. (Patient taking differently: Take 20 mg by mouth once daily.) 60 tablet 1       Scheduled meds:   sodium chloride 0.9%   Intravenous Once    amLODIPine  10 mg Oral Daily    apixaban  2.5 mg Oral BID    carvediloL  25 mg Oral BID    epoetin teresa-epbx  10,000 Units Subcutaneous Every Tues, Thurs, Sat    FLUoxetine  20 mg Oral Daily    gabapentin  100 mg Oral BID    insulin detemir U-100  5 Units Subcutaneous Daily    isosorbide mononitrate  120 mg Oral Daily    levETIRAcetam  500 mg Oral BID    methocarbamoL  500 mg Oral TID    mupirocin   Nasal BID    pantoprazole  40 mg Oral Daily    senna-docusate 8.6-50 mg  1 tablet Oral BID       Infusions:      PRN meds:  sodium chloride, acetaminophen, aluminum-magnesium hydroxide-simethicone, dextrose 10%, dextrose 10%, glucagon (human recombinant), glucose, glucose, HYDROmorphone, insulin aspart U-100, magnesium oxide, magnesium oxide, melatonin, naloxone, ondansetron, potassium bicarbonate, potassium bicarbonate, potassium bicarbonate, potassium, sodium phosphates, potassium, sodium phosphates, potassium, sodium phosphates, prochlorperazine, simethicone, sodium chloride 0.9%, sodium chloride 0.9%    Review of Systems:  Review of Systems   Constitutional:  Positive for malaise/fatigue. Negative for chills, fever and weight loss.   HENT:  Negative for hearing loss and nosebleeds.    Eyes:  Negative for blurred vision, double vision and photophobia.   Respiratory:  Negative for cough, shortness of breath and wheezing.    Cardiovascular:  Positive for chest pain. Negative for palpitations and leg swelling.   Gastrointestinal:  Positive for abdominal pain and nausea. Negative for constipation, diarrhea, heartburn and vomiting.   Genitourinary:  Negative for dysuria, frequency and urgency.   Musculoskeletal:  Negative for falls, joint pain and myalgias.   Skin:  Negative for itching and rash.   Neurological:  Positive for weakness. Negative for  dizziness, speech change, focal weakness, loss of consciousness and headaches.   Endo/Heme/Allergies:  Does not bruise/bleed easily.   Psychiatric/Behavioral:  Negative for depression and substance abuse. The patient is not nervous/anxious.      OBJECTIVE:     Vital Signs and IO:  Temp:  [98.2 °F (36.8 °C)-98.8 °F (37.1 °C)]   Pulse:  [74-79]   Resp:  [16-19]   BP: (128-163)/()   SpO2:  [93 %-99 %]   I/O last 3 completed shifts:  In: 780 [P.O.:780]  Out: -   Wt Readings from Last 5 Encounters:   12/02/22 59 kg (129 lb 15.7 oz)   11/19/22 56 kg (123 lb 7.3 oz)   11/20/22 56 kg (123 lb 7.3 oz)   11/12/22 57.1 kg (125 lb 14.1 oz)   11/06/22 59.9 kg (132 lb)     Body mass index is 23.77 kg/m².    Physical Exam  Constitutional:       General: She is not in acute distress.     Appearance: She is well-developed. She is not diaphoretic.   HENT:      Head: Normocephalic and atraumatic.      Mouth/Throat:      Mouth: Mucous membranes are moist.   Eyes:      General: No scleral icterus.     Pupils: Pupils are equal, round, and reactive to light.   Cardiovascular:      Rate and Rhythm: Normal rate and regular rhythm.   Pulmonary:      Effort: Pulmonary effort is normal. No respiratory distress.      Breath sounds: No stridor.   Abdominal:      General: There is no distension.      Palpations: Abdomen is soft.   Musculoskeletal:         General: No deformity. Normal range of motion.      Cervical back: Neck supple.   Skin:     General: Skin is warm and dry.      Findings: No erythema or rash.   Neurological:      Mental Status: She is alert and oriented to person, place, and time.      Cranial Nerves: No cranial nerve deficit.   Psychiatric:         Behavior: Behavior normal.       Laboratory:  Recent Labs   Lab 12/01/22  0542 12/02/22  0537 12/03/22  0456    138 133*   K 3.6 4.0 4.7   CL 96 104 100   CO2 26 25 24   BUN 40* 23* 31*   CREATININE 4.8* 3.6* 4.8*   * 373* 412*         Recent Labs   Lab  12/01/22  0542 12/02/22  0537 12/03/22  0456   CALCIUM 8.8 8.7 8.6*   ALBUMIN 2.8* 2.9* 2.8*   PHOS 5.2* 3.6 3.7   MG 1.8 1.9 2.1         Recent Labs   Lab 07/08/22  0516   PTH, Intact 289.8 H         Recent Labs   Lab 12/01/22  0609 12/01/22  1251 12/01/22  1619 12/01/22  2055 12/02/22  0743 12/02/22  1154 12/02/22  1639 12/02/22  2027 12/03/22  0757 12/03/22  0800   POCTGLUCOSE 306* 180* 235* 166* 372* 269* 193* 328* 413* 372*         Recent Labs   Lab 05/15/22  1954 07/22/22  1653 08/24/22  0218   Hemoglobin A1C 5.8 H 6.0 H 6.2         Recent Labs   Lab 12/01/22  0542 12/02/22  0537 12/03/22  0456   WBC 4.88 5.53 5.29   HGB 6.3* 9.1* 8.7*   HCT 18.4* 26.7* 25.3*   PLT 98* 105* 103*   MCV 80* 82 82   MCHC 34.2 34.1 34.4   MONO 9.2  0.5 9.0  0.5 9.6  0.5         Recent Labs   Lab 12/01/22  0542 12/02/22  0537 12/03/22  0456   BILITOT 1.6* 1.5* 1.0   PROT 6.2 6.4 6.2   ALBUMIN 2.8* 2.9* 2.8*   ALKPHOS 354* 321* 316*   ALT 89* 79* 62*   AST 37 33 24         Recent Labs   Lab 10/16/22  1736 10/24/22  0107 11/19/22  1210   Color, UA Yellow Yellow Yellow   Appearance, UA Hazy A Clear Clear   pH, UA 7.0 8.0 8.0   Specific [place], UA 1.020 1.020 1.025   Protein, UA 4+ 4+ 4+   Glucose, UA 4+ A 4+ A 4+ A   Ketones, UA Trace A Negative Negative   Urobilinogen, UA Negative Negative Negative   Bilirubin (UA) Negative Negative 1+ A   Occult Blood UA 2+ A 2+ A 2+ A   Nitrite, UA Negative Negative Negative   RBC, UA 12 H 41 H 38 H   WBC, UA >100 H 24 H 25 H   Bacteria Negative Negative Negative   Hyaline Casts, UA 7 A 6 A 2 A         Recent Labs   Lab 10/04/22  0901 10/16/22  1643 11/30/22  1942   POC PH 7.378 7.398 7.434   POC PCO2 54.4 H 37.6 39.8   POC HCO3 32.0 H 23.2 L 26.6   POC PO2 26 L 55 52   POC SATURATED O2 44 L 88 L 87 L   POC BE 7 -2 2   Sample VENOUS VENOUS VENOUS         Microbiology Results (last 7 days)       ** No results found for the last 168 hours. **            ASSESSMENT/PLAN:     ESRD on HD TTS via  AVF  Clearance parameters are at goal    Anemia of CKD  Transfused 2 PRBC on 12/1.  SHELLEY with HD    MBD / Secondary HPT  No acute binder needs    HTN  Controlled  UF with HD    Thank you for allowing us to participate in the care of your patient!   We will follow the patient and provide recommendations as needed.    Patient care time was spent personally by me on the following activities: > 35 min    Obtaining a history.  Examination of patient.  Providing medical care at the patients bedside.  Developing a treatment plan with patient or surrogate and bedside caregivers.  Ordering and reviewing laboratory studies, radiographic studies, pulse oximetry.  Ordering and performing treatments and interventions.  Evaluation of patient's response to treatment.  Discussions with consultants while on the unit and immediately available to the patient.  Re-evaluation of the patient's condition.  Documentation in the medical record.     Prateek Clark MD    [facility]  [address] [place] [id number]    [phone] - tel  [phone] - fax    12/3/2022

## 2022-12-03 NOTE — PLAN OF CARE
Plan of care reviewed with pt at beginning of shift.  Pt verbalized understanding. Pt with complaint of pain at every waking moment, crying in pain after eating multiple items brought by our patient care techs upon investigation (2 sandwiches, multiple bowls of cereal, popsicles, sheryl crackers) Pain/nausea medication administered- diet returned to clear liquid as she has not been able to self limit her intake after coaching on amount of intake and suspected gastroparesis , repositions self, safety maintained.  Patient has remained free from fall/injury, no skin breakdown noted.  Side rails up x2, bed in locked and lowest position, call light kept within reach.  Needs attended to

## 2022-12-04 PROBLEM — R07.9 CHEST PAIN: Status: RESOLVED | Noted: 2022-10-26 | Resolved: 2022-12-04

## 2022-12-04 PROBLEM — E87.70 VOLUME OVERLOAD: Status: RESOLVED | Noted: 2022-10-04 | Resolved: 2022-12-04

## 2022-12-04 PROBLEM — R73.9 HYPERGLYCEMIA: Status: RESOLVED | Noted: 2022-10-26 | Resolved: 2022-12-04

## 2022-12-04 PROBLEM — J96.01 ACUTE HYPOXEMIC RESPIRATORY FAILURE: Status: RESOLVED | Noted: 2022-07-22 | Resolved: 2022-12-04

## 2022-12-04 PROBLEM — R10.9 ABDOMINAL PAIN: Status: RESOLVED | Noted: 2022-10-26 | Resolved: 2022-12-04

## 2022-12-04 PROBLEM — E87.1 HYPONATREMIA: Status: RESOLVED | Noted: 2022-10-26 | Resolved: 2022-12-04

## 2022-12-04 PROBLEM — R10.13 EPIGASTRIC PAIN: Status: RESOLVED | Noted: 2022-11-11 | Resolved: 2022-12-04

## 2022-12-04 PROBLEM — K81.0 ACUTE CHOLECYSTITIS: Status: RESOLVED | Noted: 2022-07-30 | Resolved: 2022-12-04

## 2022-12-04 PROBLEM — J81.1 PULMONARY EDEMA: Status: RESOLVED | Noted: 2022-07-22 | Resolved: 2022-12-04

## 2022-12-04 LAB
ALBUMIN SERPL BCP-MCNC: 2.9 G/DL (ref 3.5–5.2)
ALP SERPL-CCNC: 347 U/L (ref 55–135)
ALT SERPL W/O P-5'-P-CCNC: 65 U/L (ref 10–44)
ANION GAP SERPL CALC-SCNC: 9 MMOL/L (ref 8–16)
AST SERPL-CCNC: 36 U/L (ref 10–40)
BASOPHILS # BLD AUTO: 0.01 K/UL (ref 0–0.2)
BASOPHILS NFR BLD: 0.2 % (ref 0–1.9)
BILIRUB SERPL-MCNC: 1.4 MG/DL (ref 0.1–1)
BUN SERPL-MCNC: 15 MG/DL (ref 6–20)
CALCIUM SERPL-MCNC: 8.8 MG/DL (ref 8.7–10.5)
CHLORIDE SERPL-SCNC: 102 MMOL/L (ref 95–110)
CO2 SERPL-SCNC: 25 MMOL/L (ref 23–29)
CREAT SERPL-MCNC: 3.3 MG/DL (ref 0.5–1.4)
CRP SERPL-MCNC: 1.7 MG/L (ref 0–8.2)
DIFFERENTIAL METHOD: ABNORMAL
EOSINOPHIL # BLD AUTO: 0.1 K/UL (ref 0–0.5)
EOSINOPHIL NFR BLD: 2.5 % (ref 0–8)
ERYTHROCYTE [DISTWIDTH] IN BLOOD BY AUTOMATED COUNT: 14.8 % (ref 11.5–14.5)
EST. GFR  (NO RACE VARIABLE): 18 ML/MIN/1.73 M^2
GLUCOSE SERPL-MCNC: 218 MG/DL (ref 70–110)
HCT VFR BLD AUTO: 26.3 % (ref 37–48.5)
HGB BLD-MCNC: 9 G/DL (ref 12–16)
IMM GRANULOCYTES # BLD AUTO: 0.02 K/UL (ref 0–0.04)
IMM GRANULOCYTES NFR BLD AUTO: 0.4 % (ref 0–0.5)
LACTATE SERPL-SCNC: 0.5 MMOL/L (ref 0.5–2.2)
LYMPHOCYTES # BLD AUTO: 0.8 K/UL (ref 1–4.8)
LYMPHOCYTES NFR BLD: 15.3 % (ref 18–48)
MAGNESIUM SERPL-MCNC: 1.9 MG/DL (ref 1.6–2.6)
MCH RBC QN AUTO: 28.2 PG (ref 27–31)
MCHC RBC AUTO-ENTMCNC: 34.2 G/DL (ref 32–36)
MCV RBC AUTO: 82 FL (ref 82–98)
MONOCYTES # BLD AUTO: 0.4 K/UL (ref 0.3–1)
MONOCYTES NFR BLD: 8.5 % (ref 4–15)
NEUTROPHILS # BLD AUTO: 3.8 K/UL (ref 1.8–7.7)
NEUTROPHILS NFR BLD: 73.1 % (ref 38–73)
NRBC BLD-RTO: 0 /100 WBC
PLATELET # BLD AUTO: 87 K/UL (ref 150–450)
PMV BLD AUTO: 8.9 FL (ref 9.2–12.9)
POCT GLUCOSE: 113 MG/DL (ref 70–110)
POCT GLUCOSE: 211 MG/DL (ref 70–110)
POCT GLUCOSE: 58 MG/DL (ref 70–110)
POCT GLUCOSE: 82 MG/DL (ref 70–110)
POTASSIUM SERPL-SCNC: 3.8 MMOL/L (ref 3.5–5.1)
PROT SERPL-MCNC: 6.5 G/DL (ref 6–8.4)
RBC # BLD AUTO: 3.19 M/UL (ref 4–5.4)
SODIUM SERPL-SCNC: 136 MMOL/L (ref 136–145)
WBC # BLD AUTO: 5.18 K/UL (ref 3.9–12.7)

## 2022-12-04 PROCEDURE — 25000003 PHARM REV CODE 250: Performed by: NURSE PRACTITIONER

## 2022-12-04 PROCEDURE — 86140 C-REACTIVE PROTEIN: CPT | Performed by: STUDENT IN AN ORGANIZED HEALTH CARE EDUCATION/TRAINING PROGRAM

## 2022-12-04 PROCEDURE — 63600175 PHARM REV CODE 636 W HCPCS: Performed by: HOSPITALIST

## 2022-12-04 PROCEDURE — 12000002 HC ACUTE/MED SURGE SEMI-PRIVATE ROOM

## 2022-12-04 PROCEDURE — 36415 COLL VENOUS BLD VENIPUNCTURE: CPT | Performed by: STUDENT IN AN ORGANIZED HEALTH CARE EDUCATION/TRAINING PROGRAM

## 2022-12-04 PROCEDURE — 83735 ASSAY OF MAGNESIUM: CPT | Performed by: STUDENT IN AN ORGANIZED HEALTH CARE EDUCATION/TRAINING PROGRAM

## 2022-12-04 PROCEDURE — 83605 ASSAY OF LACTIC ACID: CPT | Performed by: STUDENT IN AN ORGANIZED HEALTH CARE EDUCATION/TRAINING PROGRAM

## 2022-12-04 PROCEDURE — 80053 COMPREHEN METABOLIC PANEL: CPT | Performed by: STUDENT IN AN ORGANIZED HEALTH CARE EDUCATION/TRAINING PROGRAM

## 2022-12-04 PROCEDURE — 94761 N-INVAS EAR/PLS OXIMETRY MLT: CPT

## 2022-12-04 PROCEDURE — 25000003 PHARM REV CODE 250: Performed by: HOSPITALIST

## 2022-12-04 PROCEDURE — 63600175 PHARM REV CODE 636 W HCPCS: Performed by: NURSE PRACTITIONER

## 2022-12-04 PROCEDURE — 85025 COMPLETE CBC W/AUTO DIFF WBC: CPT | Performed by: STUDENT IN AN ORGANIZED HEALTH CARE EDUCATION/TRAINING PROGRAM

## 2022-12-04 PROCEDURE — 25000003 PHARM REV CODE 250: Performed by: STUDENT IN AN ORGANIZED HEALTH CARE EDUCATION/TRAINING PROGRAM

## 2022-12-04 RX ORDER — DICYCLOMINE HYDROCHLORIDE 10 MG/1
10 CAPSULE ORAL 4 TIMES DAILY
Status: DISCONTINUED | OUTPATIENT
Start: 2022-12-04 | End: 2022-12-07 | Stop reason: HOSPADM

## 2022-12-04 RX ORDER — LIDOCAINE HYDROCHLORIDE 20 MG/ML
15 SOLUTION OROPHARYNGEAL ONCE
Status: COMPLETED | OUTPATIENT
Start: 2022-12-04 | End: 2022-12-04

## 2022-12-04 RX ORDER — HYDROMORPHONE HYDROCHLORIDE 1 MG/ML
0.5 INJECTION, SOLUTION INTRAMUSCULAR; INTRAVENOUS; SUBCUTANEOUS ONCE
Status: DISCONTINUED | OUTPATIENT
Start: 2022-12-04 | End: 2022-12-04

## 2022-12-04 RX ORDER — MAG HYDROX/ALUMINUM HYD/SIMETH 200-200-20
30 SUSPENSION, ORAL (FINAL DOSE FORM) ORAL ONCE
Status: COMPLETED | OUTPATIENT
Start: 2022-12-04 | End: 2022-12-04

## 2022-12-04 RX ORDER — DICYCLOMINE HYDROCHLORIDE 10 MG/1
10 CAPSULE ORAL 4 TIMES DAILY PRN
Status: DISCONTINUED | OUTPATIENT
Start: 2022-12-04 | End: 2022-12-04

## 2022-12-04 RX ADMIN — SIMETHICONE 80 MG: 80 TABLET, CHEWABLE ORAL at 04:12

## 2022-12-04 RX ADMIN — CARVEDILOL 25 MG: 25 TABLET, FILM COATED ORAL at 08:12

## 2022-12-04 RX ADMIN — ISOSORBIDE MONONITRATE 120 MG: 60 TABLET, EXTENDED RELEASE ORAL at 08:12

## 2022-12-04 RX ADMIN — HYDROMORPHONE HYDROCHLORIDE 0.5 MG: 2 INJECTION, SOLUTION INTRAMUSCULAR; INTRAVENOUS; SUBCUTANEOUS at 11:12

## 2022-12-04 RX ADMIN — APIXABAN 2.5 MG: 2.5 TABLET, FILM COATED ORAL at 08:12

## 2022-12-04 RX ADMIN — DICYCLOMINE HYDROCHLORIDE 10 MG: 10 CAPSULE ORAL at 04:12

## 2022-12-04 RX ADMIN — Medication 16 G: at 04:12

## 2022-12-04 RX ADMIN — DICYCLOMINE HYDROCHLORIDE 10 MG: 10 CAPSULE ORAL at 08:12

## 2022-12-04 RX ADMIN — LIDOCAINE HYDROCHLORIDE 15 ML: 20 SOLUTION ORAL; TOPICAL at 02:12

## 2022-12-04 RX ADMIN — SENNOSIDES AND DOCUSATE SODIUM 1 TABLET: 50; 8.6 TABLET ORAL at 08:12

## 2022-12-04 RX ADMIN — GABAPENTIN 100 MG: 100 CAPSULE ORAL at 08:12

## 2022-12-04 RX ADMIN — METHOCARBAMOL 500 MG: 500 TABLET ORAL at 08:12

## 2022-12-04 RX ADMIN — MUPIROCIN: 20 OINTMENT TOPICAL at 08:12

## 2022-12-04 RX ADMIN — ONDANSETRON 8 MG: 2 INJECTION INTRAMUSCULAR; INTRAVENOUS at 04:12

## 2022-12-04 RX ADMIN — HYDROMORPHONE HYDROCHLORIDE 0.5 MG: 2 INJECTION, SOLUTION INTRAMUSCULAR; INTRAVENOUS; SUBCUTANEOUS at 02:12

## 2022-12-04 RX ADMIN — DICYCLOMINE HYDROCHLORIDE 10 MG: 10 CAPSULE ORAL at 10:12

## 2022-12-04 RX ADMIN — ALUMINUM HYDROXIDE, MAGNESIUM HYDROXIDE, AND SIMETHICONE 30 ML: 200; 200; 20 SUSPENSION ORAL at 01:12

## 2022-12-04 RX ADMIN — LEVETIRACETAM 500 MG: 250 TABLET, FILM COATED ORAL at 08:12

## 2022-12-04 RX ADMIN — SIMETHICONE 80 MG: 80 TABLET, CHEWABLE ORAL at 10:12

## 2022-12-04 RX ADMIN — DICYCLOMINE HYDROCHLORIDE 10 MG: 10 CAPSULE ORAL at 01:12

## 2022-12-04 RX ADMIN — INSULIN DETEMIR 8 UNITS: 100 INJECTION, SOLUTION SUBCUTANEOUS at 08:12

## 2022-12-04 RX ADMIN — HYDROMORPHONE HYDROCHLORIDE 0.5 MG: 2 INJECTION, SOLUTION INTRAMUSCULAR; INTRAVENOUS; SUBCUTANEOUS at 05:12

## 2022-12-04 RX ADMIN — INSULIN ASPART 4 UNITS: 100 INJECTION, SOLUTION INTRAVENOUS; SUBCUTANEOUS at 08:12

## 2022-12-04 RX ADMIN — FLUOXETINE 20 MG: 20 CAPSULE ORAL at 08:12

## 2022-12-04 RX ADMIN — METHOCARBAMOL 500 MG: 500 TABLET ORAL at 04:12

## 2022-12-04 RX ADMIN — PROCHLORPERAZINE EDISYLATE 10 MG: 5 INJECTION INTRAMUSCULAR; INTRAVENOUS at 08:12

## 2022-12-04 RX ADMIN — AMLODIPINE BESYLATE 10 MG: 5 TABLET ORAL at 08:12

## 2022-12-04 RX ADMIN — PANTOPRAZOLE SODIUM 40 MG: 40 TABLET, DELAYED RELEASE ORAL at 08:12

## 2022-12-04 NOTE — ASSESSMENT & PLAN NOTE
Etiology is unclear - suspecting some aspect of gastritis and gastroparesis  There is some concern of pain med seeking   Recent CT abdomen showed no abnormality that would explain her pain.  Abd XR flat/erect without acute findings  LA, CRP, and lipase WNL  Continue analgesics, including IV hydromorphone 0.5 mg Q6 PRN.  Continue PPI  Added bentyl QID  One time dose GI cocktail with viscous lidocaine  Not given reglan due to contraindication with recent seizure

## 2022-12-04 NOTE — CONSULTS
Nephrology Consult Note        Patient Name: Tabby Howard  MRN: [id number]    Patient Class: IP- Inpatient   Admission Date: 2022  Length of Stay: 0 days  Date of Service: 2022    Attending Physician: Jason Peres MD  Primary Care Provider: [facility]    Reason for Consult: esrd/anemia/htn/shpt/abdominal pain    SUBJECTIVE:     HPI: 32F with ESRD on HD, DM, HFpEF, GERD, suspected gastroparesis, sickle cell trait, HTN, presented to the ED for evaluation of abdominal pain with nausea,vomiting and diarrhea. She has had multiple ED visits and admissions for intractable nausea and vomiting as well as for gastroparesis. Her last HD session performed Tuesday. She has chest discomfort and abdominal bloating since then.     ED work up showed chronic anemia and thrombocytopenia, elevated glucose of 587 with no evidence of acidosis.  She was given a small fluid bolus in the ED, as well as one dose of morphine and several antiemetics. She reported relief after receiving droperidol. EKG was reviewed with no sign of acute ischemia or infarction. She was given IV insulin as well. Dialysis and blood transfusion was emergently performed.     VSS. HD tomorrow per schedule. Can be dc if stable.  12/3 HD today, had CLD- abd pain after- getting pain meds   no issues with HD yest- still with abd pain- no n/v    Past Medical History:   Diagnosis Date    CKD (chronic kidney disease), stage IV 2022    Diabetes mellitus due to underlying condition with unspecified complications 2022    Gastroparesis 2022    Heart failure with preserved ejection fraction 2022    EF 55% on 3/22    History of gastroesophageal reflux (GERD)     History of supraventricular tachycardia     Hyperkalemia 2022    Hypertensive emergency 2022    Sickle cell trait 2022    Type 2 diabetes mellitus      Past Surgical History:   Procedure Laterality Date     SECTION      x 3     COLONOSCOPY      COLONOSCOPY N/A 8/25/2022    Procedure: COLONOSCOPY;  Surgeon: Jagdeep Cedeno MD;  Location: [facility] ENDO;  Service: Endoscopy;  Laterality: N/A;    ESOPHAGOGASTRODUODENOSCOPY N/A 10/18/2019    Procedure: ESOPHAGOGASTRODUODENOSCOPY (EGD);  Surgeon: Gianluac Mendez MD;  Location: [facility] ENDO;  Service: Endoscopy;  Laterality: N/A;    ESOPHAGOGASTRODUODENOSCOPY N/A 08/24/2022    Procedure: EGD (ESOPHAGOGASTRODUODENOSCOPY);  Surgeon: Micky Paredes III, MD;  Location: [facility] ENDO;  Service: Endoscopy;  Laterality: N/A;    LAPAROSCOPIC CHOLECYSTECTOMY N/A 07/30/2022    Procedure: CHOLECYSTECTOMY, LAPAROSCOPIC;  Surgeon: Grey Perez MD;  Location: [facility] OR;  Service: General;  Laterality: N/A;    PLACEMENT OF DUAL-LUMEN VASCULAR CATHETER Left 07/12/2022    Procedure: INSERTION-CATHETER-JOSEPH;  Surgeon: Dionte Gan MD;  Location: [facility] OR;  Service: General;  Laterality: Left;    PLACEMENT OF DUAL-LUMEN VASCULAR CATHETER Right 07/26/2022    Procedure: INSERTION-CATHETER-Hemosplit;  Surgeon: Dionte Gan MD;  Location: [facility] OR;  Service: General;  Laterality: Right;     Family History   Problem Relation Age of Onset    Diabetes Mother     Diabetes Father      Social History     Tobacco Use    Smoking status: Never    Smokeless tobacco: Never   Substance Use Topics    Alcohol use: Not Currently    Drug use: No       Review of patient's allergies indicates:   Allergen Reactions    Penicillins Hives       Outpatient meds:  No current facility-administered medications on file prior to encounter.     Current Outpatient Medications on File Prior to Encounter   Medication Sig Dispense Refill    amLODIPine (NORVASC) 10 MG tablet Take 1 tablet (10 mg total) by mouth once daily. 30 tablet 11    apixaban (ELIQUIS) 5 mg Tab Take 1 tablet (5 mg total) by mouth 2 (two) times daily. 60 tablet 2    carvediloL (COREG) 25 MG tablet Take 1 tablet (25 mg total) by mouth 2 (two) times daily. 60 tablet 2    cloNIDine  "0.2 mg/24 hr td ptwk (CATAPRES) 0.2 mg/24 hr Place 1 patch onto the skin every 7 days. 4 patch 2    FLUoxetine 20 MG capsule Take 1 capsule (20 mg total) by mouth once daily. 30 capsule 11    gabapentin (NEURONTIN) 100 MG capsule Take 1 capsule (100 mg total) by mouth 2 (two) times daily. 90 capsule 2    insulin aspart U-100 (NOVOLOG) 100 unit/mL (3 mL) InPn pen Inject 1-10 Units into the skin before meals and at bedtime as needed (Hyperglycemia). Max daily dose 40 units. 3 mL 6    insulin detemir U-100 (LEVEMIR FLEXTOUCH) 100 unit/mL (3 mL) SubQ InPn pen Inject 5 Units into the skin once daily. Discard pen after 42 days. 6 mL 2    insulin glargine (LANTUS) 100 unit/mL injection Inject 5 Units into the skin every morning.      isosorbide mononitrate (IMDUR) 60 MG 24 hr tablet Take 2 tablets (120 mg total) by mouth once daily. 30 tablet 11    levETIRAcetam (KEPPRA) 500 MG Tab Take 1 tablet (500 mg total) by mouth 2 (two) times daily. 60 tablet 11    methocarbamoL (ROBAXIN) 500 MG Tab Take 1 tablet (500 mg total) by mouth 3 (three) times daily. 90 tablet 1    ondansetron (ZOFRAN) 4 MG tablet Take 1 tablet (4 mg total) by mouth every 8 (eight) hours as needed for Nausea. 60 tablet 2    pantoprazole (PROTONIX) 40 MG tablet Take 40 mg by mouth once daily.      pen needle, diabetic (BD ULTRA-FINE MAGALIE PEN NEEDLE) 32 gauge x 5/32" Ndle Inject into the skin 5 times per day with insulin 100 each 12    albuterol (PROVENTIL/VENTOLIN HFA) 90 mcg/actuation inhaler Inhale 2 puffs into the lungs every 6 (six) hours as needed for Wheezing. Rescue 18 g 3    hydrALAZINE (APRESOLINE) 100 MG tablet Take 1 tablet (100 mg total) by mouth every 8 (eight) hours. 90 tablet 2    [DISCONTINUED] atenoloL (TENORMIN) 50 MG tablet Take 1 tablet (50 mg total) by mouth every other day. 45 tablet 3    [DISCONTINUED] omeprazole (PRILOSEC) 20 MG capsule Take 2 capsules (40 mg total) by mouth once daily. for 10 days 20 capsule 0    [DISCONTINUED] " torsemide (DEMADEX) 20 MG Tab Take 1 tablet (20 mg total) by mouth 2 (two) times a day. (Patient taking differently: Take 20 mg by mouth once daily.) 60 tablet 1       Scheduled meds:   amLODIPine  10 mg Oral Daily    apixaban  2.5 mg Oral BID    carvediloL  25 mg Oral BID    dicyclomine  10 mg Oral QID    epoetin teresa-epbx  10,000 Units Subcutaneous Every Tues, Thurs, Sat    FLUoxetine  20 mg Oral Daily    gabapentin  100 mg Oral BID    insulin detemir U-100  8 Units Subcutaneous Daily    isosorbide mononitrate  120 mg Oral Daily    levETIRAcetam  500 mg Oral BID    LIDOcaine HCl 2%  15 mL Oral Once    methocarbamoL  500 mg Oral TID    mupirocin   Nasal BID    pantoprazole  40 mg Oral Daily    senna-docusate 8.6-50 mg  1 tablet Oral BID       Infusions:      PRN meds:  sodium chloride, acetaminophen, aluminum-magnesium hydroxide-simethicone, dextrose 10%, dextrose 10%, glucagon (human recombinant), glucose, glucose, HYDROmorphone, insulin aspart U-100, magnesium oxide, magnesium oxide, melatonin, naloxone, ondansetron, potassium bicarbonate, potassium bicarbonate, potassium bicarbonate, potassium, sodium phosphates, potassium, sodium phosphates, potassium, sodium phosphates, prochlorperazine, simethicone, sodium chloride 0.9%, sodium chloride 0.9%    Review of Systems:  Review of Systems   Constitutional:  Positive for malaise/fatigue. Negative for chills, fever and weight loss.   HENT:  Negative for hearing loss and nosebleeds.    Eyes:  Negative for blurred vision, double vision and photophobia.   Respiratory:  Negative for cough, shortness of breath and wheezing.    Cardiovascular:  Positive for chest pain. Negative for palpitations and leg swelling.   Gastrointestinal:  Positive for abdominal pain and nausea. Negative for constipation, diarrhea, heartburn and vomiting.   Genitourinary:  Negative for dysuria, frequency and urgency.   Musculoskeletal:  Negative for falls, joint pain and myalgias.   Skin:   Negative for itching and rash.   Neurological:  Positive for weakness. Negative for dizziness, speech change, focal weakness, loss of consciousness and headaches.   Endo/Heme/Allergies:  Does not bruise/bleed easily.   Psychiatric/Behavioral:  Negative for depression and substance abuse. The patient is not nervous/anxious.      OBJECTIVE:     Vital Signs and IO:  Temp:  [96.3 °F (35.7 °C)-98.8 °F (37.1 °C)]   Pulse:  [70-83]   Resp:  [15-19]   BP: (137-172)/()   SpO2:  [92 %-98 %]   I/O last 3 completed shifts:  In: 410 [P.O.:410]  Out: 3000 [Other:3000]  Wt Readings from Last 5 Encounters:   12/02/22 59 kg (129 lb 15.7 oz)   11/19/22 56 kg (123 lb 7.3 oz)   11/20/22 56 kg (123 lb 7.3 oz)   11/12/22 57.1 kg (125 lb 14.1 oz)   11/06/22 59.9 kg (132 lb)     Body mass index is 23.77 kg/m².    Physical Exam  Constitutional:       General: She is not in acute distress.     Appearance: She is well-developed. She is not diaphoretic.   HENT:      Head: Normocephalic and atraumatic.      Mouth/Throat:      Mouth: Mucous membranes are moist.   Eyes:      General: No scleral icterus.     Pupils: Pupils are equal, round, and reactive to light.   Cardiovascular:      Rate and Rhythm: Normal rate and regular rhythm.   Pulmonary:      Effort: Pulmonary effort is normal. No respiratory distress.      Breath sounds: No stridor.   Abdominal:      General: There is no distension.      Palpations: Abdomen is soft.   Musculoskeletal:         General: No deformity. Normal range of motion.      Cervical back: Neck supple.   Skin:     General: Skin is warm and dry.      Findings: No erythema or rash.   Neurological:      Mental Status: She is alert and oriented to person, place, and time.      Cranial Nerves: No cranial nerve deficit.   Psychiatric:         Behavior: Behavior normal.       Laboratory:  Recent Labs   Lab 12/02/22  0537 12/03/22  0456 12/04/22  0843    133* 136   K 4.0 4.7 3.8    100 102   CO2 25 24 25    BUN 23* 31* 15   CREATININE 3.6* 4.8* 3.3*   * 412* 218*         Recent Labs   Lab 12/01/22  0542 12/02/22  0537 12/03/22  0456 12/04/22  0843   CALCIUM 8.8 8.7 8.6* 8.8   ALBUMIN 2.8* 2.9* 2.8* 2.9*   PHOS 5.2* 3.6 3.7  --    MG 1.8 1.9 2.1 1.9         Recent Labs   Lab 07/08/22  0516   PTH, Intact 289.8 H         Recent Labs   Lab 12/02/22  1154 12/02/22  1639 12/02/22  2027 12/03/22  0757 12/03/22  0800 12/03/22  1151 12/03/22  1725 12/03/22 2008 12/04/22  0720 12/04/22  1122   POCTGLUCOSE 269* 193* 328* 413* 372* 276* 91 158* 211* 82         Recent Labs   Lab 05/15/22  1954 07/22/22  1653 08/24/22  0218   Hemoglobin A1C 5.8 H 6.0 H 6.2         Recent Labs   Lab 12/02/22  0537 12/03/22  0456 12/04/22  0843   WBC 5.53 5.29 5.18   HGB 9.1* 8.7* 9.0*   HCT 26.7* 25.3* 26.3*   * 103* 87*   MCV 82 82 82   MCHC 34.1 34.4 34.2   MONO 9.0  0.5 9.6  0.5 8.5  0.4         Recent Labs   Lab 12/02/22  0537 12/03/22  0456 12/04/22  0843   BILITOT 1.5* 1.0 1.4*   PROT 6.4 6.2 6.5   ALBUMIN 2.9* 2.8* 2.9*   ALKPHOS 321* 316* 347*   ALT 79* 62* 65*   AST 33 24 36         Recent Labs   Lab 10/16/22  1736 10/24/22  0107 11/19/22  1210   Color, UA Yellow Yellow Yellow   Appearance, UA Hazy A Clear Clear   pH, UA 7.0 8.0 8.0   Specific [place], UA 1.020 1.020 1.025   Protein, UA 4+ 4+ 4+   Glucose, UA 4+ A 4+ A 4+ A   Ketones, UA Trace A Negative Negative   Urobilinogen, UA Negative Negative Negative   Bilirubin (UA) Negative Negative 1+ A   Occult Blood UA 2+ A 2+ A 2+ A   Nitrite, UA Negative Negative Negative   RBC, UA 12 H 41 H 38 H   WBC, UA >100 H 24 H 25 H   Bacteria Negative Negative Negative   Hyaline Casts, UA 7 A 6 A 2 A         Recent Labs   Lab 10/04/22  0901 10/16/22  1643 11/30/22  1942   POC PH 7.378 7.398 7.434   POC PCO2 54.4 H 37.6 39.8   POC HCO3 32.0 H 23.2 L 26.6   POC PO2 26 L 55 52   POC SATURATED O2 44 L 88 L 87 L   POC BE 7 -2 2   Sample VENOUS VENOUS VENOUS         Microbiology Results  (last 7 days)       ** No results found for the last 168 hours. **            ASSESSMENT/PLAN:     ESRD on HD TTS via AVF  Clearance parameters are at goal    Anemia of CKD  Transfused 2 PRBC on 12/1  Hb improved  Continue SHELLEY with HD    MBD / Secondary HPT  No acute binder needs    HTN  Controlled  UF with HD    Thank you for allowing us to participate in the care of your patient!   We will follow the patient and provide recommendations as needed.    Patient care time was spent personally by me on the following activities: > 35 min    Obtaining a history.  Examination of patient.  Providing medical care at the patients bedside.  Developing a treatment plan with patient or surrogate and bedside caregivers.  Ordering and reviewing laboratory studies, radiographic studies, pulse oximetry.  Ordering and performing treatments and interventions.  Evaluation of patient's response to treatment.  Discussions with consultants while on the unit and immediately available to the patient.  Re-evaluation of the patient's condition.  Documentation in the medical record.     Prateek Clark MD    [facility]  [address] [place] [id number]    [phone] - tel  [phone] - fax    12/4/2022

## 2022-12-04 NOTE — CARE UPDATE
12/03/22 1933   Patient Assessment/Suction   Level of Consciousness (AVPU) alert   Respiratory Effort Unlabored   Expansion/Accessory Muscles/Retractions no use of accessory muscles;no retractions;expansion symmetric   Rhythm/Pattern, Respiratory unlabored;pattern regular   PRE-TX-O2   O2 Device (Oxygen Therapy) room air   SpO2 95 %   Pulse Oximetry Type Intermittent   $ Pulse Oximetry - Multiple Charge Pulse Oximetry - Multiple   Pulse 79   Resp 16

## 2022-12-04 NOTE — ASSESSMENT & PLAN NOTE
Patient's FSGs are controlled on current medication regimen.  Last A1c reviewed-   Lab Results   Component Value Date    HGBA1C 6.2 08/24/2022     Most recent fingerstick glucose reviewed-   Recent Labs   Lab 12/03/22  1725 12/03/22 2008 12/04/22  0720 12/04/22  1122   POCTGLUCOSE 91 158* 211* 82     Current correctional scale  Medium  Maintain regimen:  Antihyperglycemics (From admission, onward)    Start     Stop Route Frequency Ordered    12/04/22 0900  insulin detemir U-100 pen 8 Units         -- SubQ Daily 12/03/22 2119 11/30/22 2240  insulin aspart U-100 pen 1-10 Units         -- SubQ Before meals & nightly PRN 11/30/22 2145        Hold Oral hypoglycemics while patient is in the hospital.  Adjust regimen for goal glucose <180

## 2022-12-04 NOTE — SUBJECTIVE & OBJECTIVE
Interval History: Patient seen and examined. JOSE. N/V improved but still with moderate to severe abd pain worse in epigastrium.     Review of Systems   Constitutional:  Negative for chills and fever.   Respiratory:  Negative for cough and shortness of breath.    Cardiovascular:  Negative for chest pain and leg swelling.   Gastrointestinal:  Positive for abdominal pain. Negative for constipation, diarrhea, nausea and vomiting.   Musculoskeletal:  Positive for back pain.   Objective:     Vital Signs (Most Recent):  Temp: 96.3 °F (35.7 °C) (12/04/22 1133)  Pulse: 82 (12/04/22 1133)  Resp: 19 (12/04/22 1133)  BP: (!) 147/93 (12/04/22 1133)  SpO2: 95 % (12/04/22 1133)   Vital Signs (24h Range):  Temp:  [96.3 °F (35.7 °C)-98.8 °F (37.1 °C)] 96.3 °F (35.7 °C)  Pulse:  [70-83] 82  Resp:  [15-19] 19  SpO2:  [92 %-98 %] 95 %  BP: (137-172)/() 147/93     Weight: 59 kg (129 lb 15.7 oz)  Body mass index is 23.77 kg/m².    Intake/Output Summary (Last 24 hours) at 12/4/2022 1503  Last data filed at 12/4/2022 0500  Gross per 24 hour   Intake 200 ml   Output 3000 ml   Net -2800 ml      Physical Exam  Constitutional:       General: She is in acute distress (rubbing stomach, moaning in pain).      Appearance: She is not ill-appearing.   HENT:      Head: Normocephalic and atraumatic.   Neck:      Vascular: No JVD.   Cardiovascular:      Rate and Rhythm: Normal rate and regular rhythm.   Pulmonary:      Effort: Pulmonary effort is normal.      Breath sounds: Normal breath sounds.   Abdominal:      General: Abdomen is flat. Bowel sounds are normal. There is no distension.      Palpations: Abdomen is soft.      Tenderness: There is generalized abdominal tenderness (only mildly exacerbating her pain, worse in epigastrium). There is no guarding or rebound.   Musculoskeletal:      Right lower leg: No edema.      Left lower leg: No edema.   Skin:     General: Skin is warm and moist.      Findings: No rash.   Neurological:      Mental  Status: She is alert and oriented to person, place, and time.   Psychiatric:         Attention and Perception: Attention normal.         Mood and Affect: Affect normal.         Speech: Speech normal.      Comments: Uncomfortable affect       Significant Labs: All pertinent labs within the past 24 hours have been reviewed.    Significant Imaging: I have reviewed all pertinent imaging results/findings within the past 24 hours.

## 2022-12-04 NOTE — ASSESSMENT & PLAN NOTE
Patient's anemia is currently controlled. She received two units of PRBC on day of admission. Etiology likely d/t chronic kidney disease  Current CBC reviewed-   Lab Results   Component Value Date    HGB 8.7 (L) 12/03/2022    HCT 25.3 (L) 12/03/2022     Monitor serial CBC and transfuse if patient becomes hemodynamically unstable, symptomatic or H/H drops below 7/21.

## 2022-12-04 NOTE — SUBJECTIVE & OBJECTIVE
Interval History:  continues with severe abdominal and lower back pain.  Requiring IV hydromorphone every six hours.  Nausea is less intense.  I saw her while on the dialysis machine today.    Review of Systems   Constitutional:  Negative for chills and fever.   Respiratory:  Negative for cough and shortness of breath.    Cardiovascular:  Negative for chest pain and leg swelling.   Gastrointestinal:  Positive for abdominal pain and nausea. Negative for vomiting.   Objective:     Vital Signs (Most Recent):  Temp: 98.8 °F (37.1 °C) (12/03/22 1935)  Pulse: 78 (12/03/22 1935)  Resp: 16 (12/03/22 2009)  BP: (!) 141/85 (12/03/22 2010)  SpO2: 95 % (12/03/22 1935)   Vital Signs (24h Range):  Temp:  [97.9 °F (36.6 °C)-98.8 °F (37.1 °C)] 98.8 °F (37.1 °C)  Pulse:  [70-79] 78  Resp:  [15-19] 16  SpO2:  [93 %-99 %] 95 %  BP: (137-172)/() 141/85     Weight: 59 kg (129 lb 15.7 oz)  Body mass index is 23.77 kg/m².    Intake/Output Summary (Last 24 hours) at 12/3/2022 2056  Last data filed at 12/3/2022 1740  Gross per 24 hour   Intake 310 ml   Output 3000 ml   Net -2690 ml        Physical Exam  Constitutional:       General: She is not in acute distress.     Appearance: She is ill-appearing.      Comments: Lying on back.  HOB at about 45 degrees.  Eyes closed but answering questions.  Looks uncomfortable.   Eyes:      General:         Right eye: No discharge.         Left eye: No discharge.   Neck:      Vascular: No JVD.   Cardiovascular:      Rate and Rhythm: Normal rate and regular rhythm.   Pulmonary:      Effort: Pulmonary effort is normal.      Breath sounds: Normal breath sounds.   Abdominal:      General: Abdomen is flat. Bowel sounds are normal. There is no distension.      Palpations: Abdomen is soft.      Tenderness: There is generalized abdominal tenderness.   Musculoskeletal:      Right lower leg: No edema.      Left lower leg: No edema.   Skin:     General: Skin is warm and moist.      Findings: No rash.    Neurological:      Mental Status: She is alert and oriented to person, place, and time.   Psychiatric:         Attention and Perception: Attention normal.         Mood and Affect: Mood and affect normal.         Speech: Speech normal.       Significant Labs: All pertinent labs within the past 24 hours have been reviewed.    Significant Imaging:  None

## 2022-12-04 NOTE — ASSESSMENT & PLAN NOTE
Patient's anemia is currently controlled. She received two units of PRBC on day of admission. Etiology likely d/t chronic kidney disease  Current CBC reviewed-   Lab Results   Component Value Date    HGB 9.0 (L) 12/04/2022    HCT 26.3 (L) 12/04/2022     Monitor serial CBC and transfuse if patient becomes hemodynamically unstable, symptomatic or H/H drops below 7/21.

## 2022-12-04 NOTE — ASSESSMENT & PLAN NOTE
Patient's FSGs are uncontrolled due to hyperglycemia on current medication regimen.  Last A1c reviewed-   Lab Results   Component Value Date    HGBA1C 6.2 08/24/2022     Most recent fingerstick glucose reviewed-   Recent Labs   Lab 12/03/22  0757 12/03/22  0800 12/03/22  1151   POCTGLUCOSE 413* 372* 276*     Current correctional scale  Medium  Will increase detemir to 8 units.  Antihyperglycemics (From admission, onward)    Start     Stop Route Frequency Ordered    12/01/22 0900  insulin detemir U-100 pen 5 Units         -- SubQ Daily 11/30/22 2145    11/30/22 2240  insulin aspart U-100 pen 1-10 Units         -- SubQ Before meals & nightly PRN 11/30/22 2145        Hold Oral hypoglycemics while patient is in the hospital.

## 2022-12-04 NOTE — ASSESSMENT & PLAN NOTE
Chronic, controlled.  Latest blood pressure and vitals reviewed-   Temp:  [96.3 °F (35.7 °C)-98.8 °F (37.1 °C)]   Pulse:  [70-83]   Resp:  [15-19]   BP: (137-172)/()   SpO2:  [92 %-98 %] .   Home meds for hypertension were reviewed and noted below.   Hypertension Medications             amLODIPine (NORVASC) 10 MG tablet Take 1 tablet (10 mg total) by mouth once daily.    carvediloL (COREG) 25 MG tablet Take 1 tablet (25 mg total) by mouth 2 (two) times daily.    cloNIDine 0.2 mg/24 hr td ptwk (CATAPRES) 0.2 mg/24 hr Place 1 patch onto the skin every 7 days.    hydrALAZINE (APRESOLINE) 100 MG tablet Take 1 tablet (100 mg total) by mouth every 8 (eight) hours.    isosorbide mononitrate (IMDUR) 60 MG 24 hr tablet Take 2 tablets (120 mg total) by mouth once daily.      While in the hospital, will manage blood pressure as follows; Continue home antihypertensive regimen except for hydralazine and clonidine transdermal.    Will utilize p.r.n. blood pressure medication only if patient's blood pressure greater than  180/110 and she develops symptoms such as worsening chest pain or shortness of breath.

## 2022-12-04 NOTE — ASSESSMENT & PLAN NOTE
Etiology is unclear - suspecting some aspect of gastritis and gastroparesis/dysmotility  There is some concern of pain med seeking   Recent CT abdomen showed no abnormality that would explain her pain  Abd XR flat/erect without acute findings  LA, CRP, and lipase WNL  One time dose GI cocktail with viscous lidocaine - minimal response  Not given reglan due to contraindication with recent seizure  - Continue analgesics - dilaudid changed to tramadol to minimalize opiate dysmotility  - Continue PPI  - Continue bentyl QID  - GI consult - EGD with signs of mild gastritis/edema, keep PPI, discontinue dilaudid, symptoms attributed to dysmotility from chronic conditions, advance diet as tolerated, do not do gastric emptying study due to recent opiate use, signed off  - FLD ordered, adv as tolerated

## 2022-12-04 NOTE — ASSESSMENT & PLAN NOTE
Improved  Continue antiemetics prn  Advance diet  Will need outpatient GI f/u - may have gastroparesis

## 2022-12-04 NOTE — PROGRESS NOTES
[facility] Hospital Medicine  Progress Note    Patient Name: Tabby Howard  MRN: [id number]  Patient Class: IP- Inpatient   Admission Date: 11/30/2022  Length of Stay: 0 days  Attending Physician: Max Garcia MD  Primary Care Provider: [facility]        Subjective:     Principal Problem:Intractable nausea and vomiting        HPI:  Tabby Howard is a 32 year old female with ESRD on HD, DM, HFpEF, GERD, suspected gastroparesis, sickle cell trait, HTN, who presented to the ED for evaluation of abdominal pain with nausea,vomiting and diarrhea. She has had multiple ED visits and admissions for the same complaint in the past, for intractable nausea and vomiting as well as for gastroparesis. She is a dialysis patient, with her last session performed Tuesday. She states she has had chest discomfort and abdominal bloating since then. She denies urinary complaint, blood in vomit or stool, fever, chills or other complaint.    ED work up included a CBC which showed chronic anemia and thrombocytopenia. CMP showed an elevated glucose of 587 with no evidence of acidosis. Liver enzymes are elevated from baseline today.  She was given a small fluid bolus in the ED, as well as one dose of morphine and several antiemetics. She reported relief after receiving droperidol. EKG was reviewed with no sign of acute ischemia or infarction. She was given IV insulin as well. Hospital Medicine consulted for admission and further management.      Overview/Hospital Course:  Pt was admitted with unrelenting abdominal pain and nausea with intermittent vomiting.  Was given analgesics and anti-emetics.  The nausea became less intense and vomiting resolved.  However, the pain remained significant.  Symptomatic treatment was continued.  Suspected her symptoms were related to gastroparesis and likely gastritis as well. She was kept on PPI.  Reglan contraindicated as she has had a somewhat recent  seizure and is an anti-epileptic. We made sure her bowels kept moving on bowel regimen. Abd XR flat/erect without acute findings. Lipase WNL. Lactic acid and CRP WNL. Bentyl scheduled. GI cocktail with viscous lidocaine ordered. Took a clear liquid diet but did report some mildly increased pain.      Interval History: Patient seen and examined. NAEON. N/V improved but still with moderate to severe abd pain worse in epigastrium.     Review of Systems   Constitutional:  Negative for chills and fever.   Respiratory:  Negative for cough and shortness of breath.    Cardiovascular:  Negative for chest pain and leg swelling.   Gastrointestinal:  Positive for abdominal pain. Negative for constipation, diarrhea, nausea and vomiting.   Musculoskeletal:  Positive for back pain.   Objective:     Vital Signs (Most Recent):  Temp: 96.3 °F (35.7 °C) (12/04/22 1133)  Pulse: 82 (12/04/22 1133)  Resp: 19 (12/04/22 1133)  BP: (!) 147/93 (12/04/22 1133)  SpO2: 95 % (12/04/22 1133)   Vital Signs (24h Range):  Temp:  [96.3 °F (35.7 °C)-98.8 °F (37.1 °C)] 96.3 °F (35.7 °C)  Pulse:  [70-83] 82  Resp:  [15-19] 19  SpO2:  [92 %-98 %] 95 %  BP: (137-172)/() 147/93     Weight: 59 kg (129 lb 15.7 oz)  Body mass index is 23.77 kg/m².    Intake/Output Summary (Last 24 hours) at 12/4/2022 1503  Last data filed at 12/4/2022 0500  Gross per 24 hour   Intake 200 ml   Output 3000 ml   Net -2800 ml      Physical Exam  Constitutional:       General: She is in acute distress (rubbing stomach, moaning in pain).      Appearance: She is not ill-appearing.   HENT:      Head: Normocephalic and atraumatic.   Neck:      Vascular: No JVD.   Cardiovascular:      Rate and Rhythm: Normal rate and regular rhythm.   Pulmonary:      Effort: Pulmonary effort is normal.      Breath sounds: Normal breath sounds.   Abdominal:      General: Abdomen is flat. Bowel sounds are normal. There is no distension.      Palpations: Abdomen is soft.      Tenderness: There is  generalized abdominal tenderness (only mildly exacerbating her pain, worse in epigastrium). There is no guarding or rebound.   Musculoskeletal:      Right lower leg: No edema.      Left lower leg: No edema.   Skin:     General: Skin is warm and moist.      Findings: No rash.   Neurological:      Mental Status: She is alert and oriented to person, place, and time.   Psychiatric:         Attention and Perception: Attention normal.         Mood and Affect: Affect normal.         Speech: Speech normal.      Comments: Uncomfortable affect       Significant Labs: All pertinent labs within the past 24 hours have been reviewed.    Significant Imaging: I have reviewed all pertinent imaging results/findings within the past 24 hours.      Assessment/Plan:      * Intractable nausea and vomiting  Improved  Continue antiemetics prn  Advance diet  Will need outpatient GI f/u - may have gastroparesis    Intractable generalized abdominal pain  Etiology is unclear - suspecting some aspect of gastritis and gastroparesis  There is some concern of pain med seeking   Recent CT abdomen showed no abnormality that would explain her pain.  Abd XR flat/erect without acute findings  LA, CRP, and lipase WNL  Continue analgesics, including IV hydromorphone 0.5 mg Q6 PRN.  Continue PPI  Added bentyl QID  One time dose GI cocktail with viscous lidocaine  Not given reglan due to contraindication with recent seizure  GI consult ordered    Anemia due to chronic kidney disease, on chronic dialysis  Patient's anemia is currently controlled. She received two units of PRBC on day of admission. Etiology likely d/t chronic kidney disease  Current CBC reviewed-   Lab Results   Component Value Date    HGB 9.0 (L) 12/04/2022    HCT 26.3 (L) 12/04/2022     Monitor serial CBC and transfuse if patient becomes hemodynamically unstable, symptomatic or H/H drops below 7/21.     Malnutrition of moderate degree  Nutrition consulted. Most recent weight and BMI  monitored      Hypertension  Chronic, controlled.  Latest blood pressure and vitals reviewed-   Temp:  [96.3 °F (35.7 °C)-98.8 °F (37.1 °C)]   Pulse:  [70-83]   Resp:  [15-19]   BP: (137-172)/()   SpO2:  [92 %-98 %] .   Home meds for hypertension were reviewed and noted below.   Hypertension Medications               amLODIPine (NORVASC) 10 MG tablet Take 1 tablet (10 mg total) by mouth once daily.    carvediloL (COREG) 25 MG tablet Take 1 tablet (25 mg total) by mouth 2 (two) times daily.    cloNIDine 0.2 mg/24 hr td ptwk (CATAPRES) 0.2 mg/24 hr Place 1 patch onto the skin every 7 days.    hydrALAZINE (APRESOLINE) 100 MG tablet Take 1 tablet (100 mg total) by mouth every 8 (eight) hours.    isosorbide mononitrate (IMDUR) 60 MG 24 hr tablet Take 2 tablets (120 mg total) by mouth once daily.        While in the hospital, will manage blood pressure as follows; Continue home antihypertensive regimen except for hydralazine and clonidine transdermal.    Will utilize p.r.n. blood pressure medication only if patient's blood pressure greater than  180/110 and she develops symptoms such as worsening chest pain or shortness of breath.    Deep vein thrombosis (DVT) of non-extremity vein  Noted, on eliquis    ESRD (end stage renal disease) on dialysis  Nephro following and dialyzing on schedule    Seizure  Noted, chronic  Continue keppra  Seizure precautions    Gastroparesis - suspected, unconfirmed        Type 2 diabetes mellitus with chronic kidney disease on chronic dialysis, with long-term current use of insulin  Patient's FSGs are controlled on current medication regimen.  Last A1c reviewed-   Lab Results   Component Value Date    HGBA1C 6.2 08/24/2022     Most recent fingerstick glucose reviewed-   Recent Labs   Lab 12/03/22  1725 12/03/22 2008 12/04/22  0720 12/04/22  1122   POCTGLUCOSE 91 158* 211* 82     Current correctional scale  Medium  Maintain regimen:  Antihyperglycemics (From admission, onward)      Start      Stop Route Frequency Ordered    12/04/22 0900  insulin detemir U-100 pen 8 Units         -- SubQ Daily 12/03/22 2119 11/30/22 2240  insulin aspart U-100 pen 1-10 Units         -- SubQ Before meals & nightly PRN 11/30/22 2145          Hold Oral hypoglycemics while patient is in the hospital.  Adjust regimen for goal glucose <180    Gastritis  Noted, chronic; may be exacerbated  Continue protonix      VTE Risk Mitigation (From admission, onward)           Ordered     apixaban tablet 2.5 mg  2 times daily         12/02/22 0915     Reason for No Pharmacological VTE Prophylaxis  Once        Question:  Reasons:  Answer:  Already adequately anticoagulated on oral Anticoagulants    11/30/22 2145     IP VTE HIGH RISK PATIENT  Once         11/30/22 2145     Place sequential compression device  Until discontinued         11/30/22 2145                    Discharge Planning   TATIANA: 12/5-12/6     Code Status: Full Code   Is the patient medically ready for discharge?:     Reason for patient still in hospital (select all that apply): Patient trending condition and Treatment  Discharge Plan A: Home with family          Max Garcia MD  Department of Hospital Medicine   [facility]

## 2022-12-04 NOTE — PLAN OF CARE
Problem: Adult Inpatient Plan of Care  Goal: Plan of Care Review  Outcome: Ongoing, Progressing   Supine with HOB up 35. POC and medications reviewed with pt. Verbalized understanding. Tele [id number] in use. HL in right wrist with DDI. No redness or swelling at site. SR up x 2. Call light in reach. Bed in lowest position with brakes locked. Will continue to monitor.   Problem: Adult Inpatient Plan of Care  Goal: Optimal Comfort and Wellbeing  Outcome: Ongoing, Progressing   Q 2 hourly rounds made and IV site, pain and position monitored through out shift and PRN pain meds given per MD order.   Problem: Hemodynamic Instability (Hemodialysis)  Goal: Effective Tissue Perfusion  Outcome: Ongoing, Progressing   Pt had dialysis yesterday.   Problem: Diabetes Comorbidity  Goal: Blood Glucose Level Within Targeted Range  Outcome: Ongoing, Progressing   Cbs monitored through out shift and insulin given per MD order.

## 2022-12-04 NOTE — HOSPITAL COURSE
Pt was admitted with unrelenting abdominal pain and nausea with intermittent vomiting.  Was given analgesics and anti-emetics.  The nausea became less intense and vomiting resolved.  However, the pain remained significant.  Symptomatic treatment was continued.  Suspected her symptoms were related to gastroparesis and likely gastritis as well. She was kept on PPI.  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 We made sure her bowels kept moving on bowel regimen. Abd XR flat/erect without acute findings. Lipase WNL. Lactic acid and CRP WNL. Bentyl scheduled. GI cocktail with viscous lidocaine ordered with minimal effect. Took a clear liquid diet but did report some mildly increased pain. GI consulted and performed EGD which showed patchy areas of inflammation likely edema and gastritis. EGD biopsies taken. GI recommended discontinuation of her dilaudid as her symptoms were attributed to GI tract dysmotility related to her chronic conditions. Acetaminophen and tramadol were used in place of dilaudid to minimalize the effects of opiates on motility per GI recommendation. She frequently asked for dilaudid and was upset with its discontinuation. She continued with similar pain and tolerated a full liquid diet. Underwent routine dialysis on her scheduled days. Required few red blood cell transfusions with dialysis per nephrology for low H/H. No other signs of bleeding. Tele-psych consult done and mirtazapine added per recommendation of possible adjustment disorder. Diabetic/renal diet ordered which she did not prefer most of the options offered so ended up mostly eating cereal which she tolerated. She will be discharged with close PCP follow up. Psych referral was placed as well. See med rec for new meds and/or changes. By time of discharge the patient was tolerating regular foods with improved admission symptoms.    Physical Exam on day of discharge:  Constitutional:        General: She is not in acute distress.     Appearance: She is not ill-appearing.   HENT:      Head: Normocephalic and atraumatic.   Neck:      Vascular: No JVD.   Cardiovascular:      Rate and Rhythm: Normal rate and regular rhythm.   Pulmonary:      Effort: Pulmonary effort is normal.      Breath sounds: Normal breath sounds.   Abdominal:      General: Abdomen is flat. Bowel sounds are normal. There is no distension.      Palpations: Abdomen is soft.      Tenderness: There is generalized abdominal tenderness (only mildly exacerbating her pain, worse in epigastrium). There is no guarding or rebound.   Musculoskeletal:      Right lower leg: No edema.      Left lower leg: No edema.   Skin:     General: Skin is warm and moist.      Findings: No rash.   Neurological:      Mental Status: She is alert and oriented to person, place, and time.   Psychiatric:         Attention and Perception: Attention normal.         Mood and Affect: Affect normal.         Speech: Speech normal.

## 2022-12-04 NOTE — ASSESSMENT & PLAN NOTE
CT abdomen showed no abnormality that would explain her pain.  No improvement.  Continue analgesics, including IV hydromorphone 0.5 mg Q6 PRN.  Monitor for excessive sedation.  She's on pantoprazole as well.

## 2022-12-04 NOTE — PROGRESS NOTES
[facility] Hospital Medicine  Progress Note    Patient Name: Tabby Howard  MRN: [id number]  Patient Class: OP- Observation   Admission Date: 11/30/2022  Length of Stay: 0 days  Attending Physician: Jason Peres MD  Primary Care Provider: [facility]        Subjective:     Principal Problem:Intractable nausea and vomiting        HPI:  Tabby Howard is a 32 year old female with ESRD on HD, DM, HFpEF, GERD, suspected gastroparesis, sickle cell trait, HTN, who presented to the ED for evaluation of abdominal pain with nausea,vomiting and diarrhea. She has had multiple ED visits and admissions for the same complaint in the past, for intractable nausea and vomiting as well as for gastroparesis. She is a dialysis patient, with her last session performed Tuesday. She states she has had chest discomfort and abdominal bloating since then. She denies urinary complaint, blood in vomit or stool, fever, chills or other complaint.    ED work up included a CBC which showed chronic anemia and thrombocytopenia. CMP showed an elevated glucose of 587 with no evidence of acidosis. Liver enzymes are elevated from baseline today.  She was given a small fluid bolus in the ED, as well as one dose of morphine and several antiemetics. She reported relief after receiving droperidol. EKG was reviewed with no sign of acute ischemia or infarction. She was given IV insulin as well. Hospital Medicine consulted for admission and further management.      Overview/Hospital Course:  Pt was admitted with unrelenting abdominal pain and nausea.  Was given analgesics and anti-emetics.  The nausea became less intense.  However, the pain did not.  It remained significant.  Symptomatic treatment was continued.  She was kept on PPI.  We made sure her bowels kept moving.      Interval History:  continues with severe abdominal and lower back pain.  Requiring IV hydromorphone every six hours.  Nausea is  less intense.  I saw her while on the dialysis machine today.    Review of Systems   Constitutional:  Negative for chills and fever.   Respiratory:  Negative for cough and shortness of breath.    Cardiovascular:  Negative for chest pain and leg swelling.   Gastrointestinal:  Positive for abdominal pain and nausea. Negative for vomiting.   Objective:     Vital Signs (Most Recent):  Temp: 98.8 °F (37.1 °C) (12/03/22 1935)  Pulse: 78 (12/03/22 1935)  Resp: 16 (12/03/22 2009)  BP: (!) 141/85 (12/03/22 2010)  SpO2: 95 % (12/03/22 1935)   Vital Signs (24h Range):  Temp:  [97.9 °F (36.6 °C)-98.8 °F (37.1 °C)] 98.8 °F (37.1 °C)  Pulse:  [70-79] 78  Resp:  [15-19] 16  SpO2:  [93 %-99 %] 95 %  BP: (137-172)/() 141/85     Weight: 59 kg (129 lb 15.7 oz)  Body mass index is 23.77 kg/m².    Intake/Output Summary (Last 24 hours) at 12/3/2022 2056  Last data filed at 12/3/2022 1740  Gross per 24 hour   Intake 310 ml   Output 3000 ml   Net -2690 ml        Physical Exam  Constitutional:       General: She is not in acute distress.     Appearance: She is ill-appearing.      Comments: Lying on back.  HOB at about 45 degrees.  Eyes closed but answering questions.  Looks uncomfortable.   Eyes:      General:         Right eye: No discharge.         Left eye: No discharge.   Neck:      Vascular: No JVD.   Cardiovascular:      Rate and Rhythm: Normal rate and regular rhythm.   Pulmonary:      Effort: Pulmonary effort is normal.      Breath sounds: Normal breath sounds.   Abdominal:      General: Abdomen is flat. Bowel sounds are normal. There is no distension.      Palpations: Abdomen is soft.      Tenderness: There is generalized abdominal tenderness.   Musculoskeletal:      Right lower leg: No edema.      Left lower leg: No edema.   Skin:     General: Skin is warm and moist.      Findings: No rash.   Neurological:      Mental Status: She is alert and oriented to person, place, and time.   Psychiatric:         Attention and  Perception: Attention normal.         Mood and Affect: Mood and affect normal.         Speech: Speech normal.       Significant Labs: All pertinent labs within the past 24 hours have been reviewed.    Significant Imaging:  None      Assessment/Plan:      * Intractable nausea and vomiting  Improved.  She's receiving doses of IV Zofran.  Will continue.      Intractable generalized abdominal pain  CT abdomen showed no abnormality that would explain her pain.  No improvement.  Continue analgesics, including IV hydromorphone 0.5 mg Q6 PRN.  Monitor for excessive sedation.  She's on pantoprazole as well.      Anemia due to chronic kidney disease, on chronic dialysis    Patient's anemia is currently controlled. She received two units of PRBC on day of admission. Etiology likely d/t chronic kidney disease  Current CBC reviewed-   Lab Results   Component Value Date    HGB 8.7 (L) 12/03/2022    HCT 25.3 (L) 12/03/2022     Monitor serial CBC and transfuse if patient becomes hemodynamically unstable, symptomatic or H/H drops below 7/21.       Malnutrition of moderate degree  Nutrition consulted. Most recent weight and BMI monitored-                         Measurements:  Wt Readings from Last 1 Encounters:   11/30/22 59 kg (130 lb)   Body mass index is 23.78 kg/m².    Recommendations:      Patient has been screened and assessed by RD. RD will follow patient.      Hypertension  Chronic, controlled.  Latest blood pressure and vitals reviewed-   Temp:  [98 °F (36.7 °C)-99.5 °F (37.5 °C)]   Pulse:  [72-85]   Resp:  []   BP: (112-179)/()   SpO2:  [93 %-100 %] .   Home meds for hypertension were reviewed and noted below.   Hypertension Medications             amLODIPine (NORVASC) 10 MG tablet Take 1 tablet (10 mg total) by mouth once daily.    carvediloL (COREG) 25 MG tablet Take 1 tablet (25 mg total) by mouth 2 (two) times daily.    cloNIDine 0.2 mg/24 hr td ptwk (CATAPRES) 0.2 mg/24 hr Place 1 patch onto the skin every  7 days.    hydrALAZINE (APRESOLINE) 100 MG tablet Take 1 tablet (100 mg total) by mouth every 8 (eight) hours.    isosorbide mononitrate (IMDUR) 60 MG 24 hr tablet Take 2 tablets (120 mg total) by mouth once daily.          While in the hospital, will manage blood pressure as follows; Continue home antihypertensive regimen except for hydralazine and clonidine transdermal.    Will utilize p.r.n. blood pressure medication only if patient's blood pressure greater than  180/110 and she develops symptoms such as worsening chest pain or shortness of breath.        Deep vein thrombosis (DVT) of non-extremity vein  Noted, on eliquis      ESRD (end stage renal disease) on dialysis  Consulting nephrology for HD needs.  Pt is on her routine schedule.      Seizure  Noted, chronic  Continue keppra  Seizure precautions      Gastroparesis - suspected, unconfirmed        Type 2 diabetes mellitus with chronic kidney disease on chronic dialysis, with long-term current use of insulin  Patient's FSGs are uncontrolled due to hyperglycemia on current medication regimen.  Last A1c reviewed-   Lab Results   Component Value Date    HGBA1C 6.2 08/24/2022     Most recent fingerstick glucose reviewed-   Recent Labs   Lab 12/03/22  0757 12/03/22  0800 12/03/22  1151   POCTGLUCOSE 413* 372* 276*     Current correctional scale  Medium  Will increase detemir to 8 units.  Antihyperglycemics (From admission, onward)    Start     Stop Route Frequency Ordered    12/01/22 0900  insulin detemir U-100 pen 5 Units         -- SubQ Daily 11/30/22 2145    11/30/22 2240  insulin aspart U-100 pen 1-10 Units         -- SubQ Before meals & nightly PRN 11/30/22 2145        Hold Oral hypoglycemics while patient is in the hospital.    Gastritis  Noted, chronic  protonix        VTE Risk Mitigation (From admission, onward)         Ordered     apixaban tablet 2.5 mg  2 times daily         12/02/22 0915     Reason for No Pharmacological VTE Prophylaxis  Once         Question:  Reasons:  Answer:  Already adequately anticoagulated on oral Anticoagulants    11/30/22 2145     IP VTE HIGH RISK PATIENT  Once         11/30/22 2145     Place sequential compression device  Until discontinued         11/30/22 2145                Discharge Planning   TATIANA: 12/3/2022     Code Status: Full Code   Is the patient medically ready for discharge?:     Reason for patient still in hospital (select all that apply): Patient trending condition and Treatment  Discharge Plan A: Home with family                  Jason Peres MD  Department of Hospital Medicine   [facility]

## 2022-12-04 NOTE — PLAN OF CARE
Plan of care reviewed with patient. Patient verbalized complete understanding. Pt awake, alert and oriented. Pt complaining of severe pain, meds given. Pt has not complained of nausea. BG monitored. Pt on tele monitoring   All fall precautions maintained, bed in lowest position, locked, call light within reach. Side rails up times 2. Slip resistant socks maintained.

## 2022-12-05 ENCOUNTER — ANESTHESIA (OUTPATIENT)
Dept: ENDOSCOPY | Facility: HOSPITAL | Age: 32
DRG: 073 | End: 2022-12-05
Payer: MEDICAID

## 2022-12-05 ENCOUNTER — ANESTHESIA EVENT (OUTPATIENT)
Dept: ENDOSCOPY | Facility: HOSPITAL | Age: 32
DRG: 073 | End: 2022-12-05
Payer: MEDICAID

## 2022-12-05 LAB
ALBUMIN SERPL BCP-MCNC: 2.7 G/DL (ref 3.5–5.2)
ALP SERPL-CCNC: 309 U/L (ref 55–135)
ALT SERPL W/O P-5'-P-CCNC: 47 U/L (ref 10–44)
ANION GAP SERPL CALC-SCNC: 8 MMOL/L (ref 8–16)
AST SERPL-CCNC: 27 U/L (ref 10–40)
BASOPHILS # BLD AUTO: 0.03 K/UL (ref 0–0.2)
BASOPHILS NFR BLD: 0.6 % (ref 0–1.9)
BILIRUB SERPL-MCNC: 1.7 MG/DL (ref 0.1–1)
BUN SERPL-MCNC: 23 MG/DL (ref 6–20)
CALCIUM SERPL-MCNC: 8.9 MG/DL (ref 8.7–10.5)
CHLORIDE SERPL-SCNC: 103 MMOL/L (ref 95–110)
CO2 SERPL-SCNC: 25 MMOL/L (ref 23–29)
CREAT SERPL-MCNC: 4.5 MG/DL (ref 0.5–1.4)
DIFFERENTIAL METHOD: ABNORMAL
EOSINOPHIL # BLD AUTO: 0.1 K/UL (ref 0–0.5)
EOSINOPHIL NFR BLD: 2.1 % (ref 0–8)
ERYTHROCYTE [DISTWIDTH] IN BLOOD BY AUTOMATED COUNT: 15.4 % (ref 11.5–14.5)
EST. GFR  (NO RACE VARIABLE): 13 ML/MIN/1.73 M^2
GLUCOSE SERPL-MCNC: 139 MG/DL (ref 70–110)
HCT VFR BLD AUTO: 24.4 % (ref 37–48.5)
HGB BLD-MCNC: 7.9 G/DL (ref 12–16)
IMM GRANULOCYTES # BLD AUTO: 0.03 K/UL (ref 0–0.04)
IMM GRANULOCYTES NFR BLD AUTO: 0.6 % (ref 0–0.5)
LYMPHOCYTES # BLD AUTO: 0.9 K/UL (ref 1–4.8)
LYMPHOCYTES NFR BLD: 18.6 % (ref 18–48)
MCH RBC QN AUTO: 27.9 PG (ref 27–31)
MCHC RBC AUTO-ENTMCNC: 32.4 G/DL (ref 32–36)
MCV RBC AUTO: 86 FL (ref 82–98)
MONOCYTES # BLD AUTO: 0.4 K/UL (ref 0.3–1)
MONOCYTES NFR BLD: 7.7 % (ref 4–15)
NEUTROPHILS # BLD AUTO: 3.3 K/UL (ref 1.8–7.7)
NEUTROPHILS NFR BLD: 70.4 % (ref 38–73)
NRBC BLD-RTO: 0 /100 WBC
PLATELET # BLD AUTO: 70 K/UL (ref 150–450)
PMV BLD AUTO: 9.2 FL (ref 9.2–12.9)
POCT GLUCOSE: 120 MG/DL (ref 70–110)
POCT GLUCOSE: 143 MG/DL (ref 70–110)
POCT GLUCOSE: 166 MG/DL (ref 70–110)
POCT GLUCOSE: 96 MG/DL (ref 70–110)
POTASSIUM SERPL-SCNC: 4.5 MMOL/L (ref 3.5–5.1)
PROT SERPL-MCNC: 5.9 G/DL (ref 6–8.4)
RBC # BLD AUTO: 2.83 M/UL (ref 4–5.4)
SODIUM SERPL-SCNC: 136 MMOL/L (ref 136–145)
WBC # BLD AUTO: 4.67 K/UL (ref 3.9–12.7)

## 2022-12-05 PROCEDURE — 63600175 PHARM REV CODE 636 W HCPCS: Performed by: HOSPITALIST

## 2022-12-05 PROCEDURE — D9220A PRA ANESTHESIA: ICD-10-PCS | Mod: ANES,,, | Performed by: ANESTHESIOLOGY

## 2022-12-05 PROCEDURE — 80053 COMPREHEN METABOLIC PANEL: CPT | Performed by: STUDENT IN AN ORGANIZED HEALTH CARE EDUCATION/TRAINING PROGRAM

## 2022-12-05 PROCEDURE — 25000003 PHARM REV CODE 250: Performed by: STUDENT IN AN ORGANIZED HEALTH CARE EDUCATION/TRAINING PROGRAM

## 2022-12-05 PROCEDURE — 94761 N-INVAS EAR/PLS OXIMETRY MLT: CPT

## 2022-12-05 PROCEDURE — 85025 COMPLETE CBC W/AUTO DIFF WBC: CPT | Performed by: STUDENT IN AN ORGANIZED HEALTH CARE EDUCATION/TRAINING PROGRAM

## 2022-12-05 PROCEDURE — 37000009 HC ANESTHESIA EA ADD 15 MINS: Performed by: INTERNAL MEDICINE

## 2022-12-05 PROCEDURE — 25000003 PHARM REV CODE 250: Performed by: NURSE PRACTITIONER

## 2022-12-05 PROCEDURE — 88305 TISSUE EXAM BY PATHOLOGIST: ICD-10-PCS | Mod: 26,,, | Performed by: STUDENT IN AN ORGANIZED HEALTH CARE EDUCATION/TRAINING PROGRAM

## 2022-12-05 PROCEDURE — D9220A PRA ANESTHESIA: Mod: CRNA,,, | Performed by: NURSE ANESTHETIST, CERTIFIED REGISTERED

## 2022-12-05 PROCEDURE — 43239 EGD BIOPSY SINGLE/MULTIPLE: CPT | Mod: ,,, | Performed by: INTERNAL MEDICINE

## 2022-12-05 PROCEDURE — 88342 CHG IMMUNOCYTOCHEMISTRY: ICD-10-PCS | Mod: 26,,, | Performed by: STUDENT IN AN ORGANIZED HEALTH CARE EDUCATION/TRAINING PROGRAM

## 2022-12-05 PROCEDURE — 88305 TISSUE EXAM BY PATHOLOGIST: CPT | Performed by: STUDENT IN AN ORGANIZED HEALTH CARE EDUCATION/TRAINING PROGRAM

## 2022-12-05 PROCEDURE — 36415 COLL VENOUS BLD VENIPUNCTURE: CPT | Performed by: STUDENT IN AN ORGANIZED HEALTH CARE EDUCATION/TRAINING PROGRAM

## 2022-12-05 PROCEDURE — 99222 1ST HOSP IP/OBS MODERATE 55: CPT | Mod: ,,, | Performed by: INTERNAL MEDICINE

## 2022-12-05 PROCEDURE — 63600175 PHARM REV CODE 636 W HCPCS: Performed by: NURSE ANESTHETIST, CERTIFIED REGISTERED

## 2022-12-05 PROCEDURE — 88305 TISSUE EXAM BY PATHOLOGIST: CPT | Mod: 26,,, | Performed by: STUDENT IN AN ORGANIZED HEALTH CARE EDUCATION/TRAINING PROGRAM

## 2022-12-05 PROCEDURE — 99222 PR INITIAL HOSPITAL CARE,LEVL II: ICD-10-PCS | Mod: ,,, | Performed by: INTERNAL MEDICINE

## 2022-12-05 PROCEDURE — 25000003 PHARM REV CODE 250: Performed by: HOSPITALIST

## 2022-12-05 PROCEDURE — D9220A PRA ANESTHESIA: Mod: ANES,,, | Performed by: ANESTHESIOLOGY

## 2022-12-05 PROCEDURE — 37000008 HC ANESTHESIA 1ST 15 MINUTES: Performed by: INTERNAL MEDICINE

## 2022-12-05 PROCEDURE — 25000003 PHARM REV CODE 250: Performed by: NURSE ANESTHETIST, CERTIFIED REGISTERED

## 2022-12-05 PROCEDURE — 88342 IMHCHEM/IMCYTCHM 1ST ANTB: CPT | Performed by: STUDENT IN AN ORGANIZED HEALTH CARE EDUCATION/TRAINING PROGRAM

## 2022-12-05 PROCEDURE — 43239 PR EGD, FLEX, W/BIOPSY, SGL/MULTI: ICD-10-PCS | Mod: ,,, | Performed by: INTERNAL MEDICINE

## 2022-12-05 PROCEDURE — 12000002 HC ACUTE/MED SURGE SEMI-PRIVATE ROOM

## 2022-12-05 PROCEDURE — 88342 IMHCHEM/IMCYTCHM 1ST ANTB: CPT | Mod: 26,,, | Performed by: STUDENT IN AN ORGANIZED HEALTH CARE EDUCATION/TRAINING PROGRAM

## 2022-12-05 PROCEDURE — 27000221 HC OXYGEN, UP TO 24 HOURS

## 2022-12-05 PROCEDURE — D9220A PRA ANESTHESIA: ICD-10-PCS | Mod: CRNA,,, | Performed by: NURSE ANESTHETIST, CERTIFIED REGISTERED

## 2022-12-05 PROCEDURE — 27201012 HC FORCEPS, HOT/COLD, DISP: Performed by: INTERNAL MEDICINE

## 2022-12-05 PROCEDURE — 43239 EGD BIOPSY SINGLE/MULTIPLE: CPT | Performed by: INTERNAL MEDICINE

## 2022-12-05 RX ORDER — PROPOFOL 10 MG/ML
VIAL (ML) INTRAVENOUS
Status: DISCONTINUED | OUTPATIENT
Start: 2022-12-05 | End: 2022-12-05

## 2022-12-05 RX ORDER — TRAMADOL HYDROCHLORIDE 50 MG/1
50 TABLET ORAL EVERY 8 HOURS PRN
Status: DISCONTINUED | OUTPATIENT
Start: 2022-12-05 | End: 2022-12-07 | Stop reason: HOSPADM

## 2022-12-05 RX ORDER — LIDOCAINE HCL/PF 100 MG/5ML
SYRINGE (ML) INTRAVENOUS
Status: DISCONTINUED | OUTPATIENT
Start: 2022-12-05 | End: 2022-12-05

## 2022-12-05 RX ADMIN — LIDOCAINE HYDROCHLORIDE 100 MG: 20 INJECTION INTRAVENOUS at 10:12

## 2022-12-05 RX ADMIN — DICYCLOMINE HYDROCHLORIDE 10 MG: 10 CAPSULE ORAL at 08:12

## 2022-12-05 RX ADMIN — FLUOXETINE 20 MG: 20 CAPSULE ORAL at 11:12

## 2022-12-05 RX ADMIN — CARVEDILOL 25 MG: 25 TABLET, FILM COATED ORAL at 08:12

## 2022-12-05 RX ADMIN — INSULIN DETEMIR 8 UNITS: 100 INJECTION, SOLUTION SUBCUTANEOUS at 11:12

## 2022-12-05 RX ADMIN — PROPOFOL 100 MG: 10 INJECTION, EMULSION INTRAVENOUS at 10:12

## 2022-12-05 RX ADMIN — PANTOPRAZOLE SODIUM 40 MG: 40 TABLET, DELAYED RELEASE ORAL at 11:12

## 2022-12-05 RX ADMIN — LEVETIRACETAM 500 MG: 250 TABLET, FILM COATED ORAL at 08:12

## 2022-12-05 RX ADMIN — AMLODIPINE BESYLATE 10 MG: 5 TABLET ORAL at 11:12

## 2022-12-05 RX ADMIN — DICYCLOMINE HYDROCHLORIDE 10 MG: 10 CAPSULE ORAL at 11:12

## 2022-12-05 RX ADMIN — PROPOFOL 50 MG: 10 INJECTION, EMULSION INTRAVENOUS at 10:12

## 2022-12-05 RX ADMIN — MUPIROCIN: 20 OINTMENT TOPICAL at 08:12

## 2022-12-05 RX ADMIN — MUPIROCIN: 20 OINTMENT TOPICAL at 11:12

## 2022-12-05 RX ADMIN — HYDROMORPHONE HYDROCHLORIDE 0.5 MG: 2 INJECTION, SOLUTION INTRAMUSCULAR; INTRAVENOUS; SUBCUTANEOUS at 12:12

## 2022-12-05 RX ADMIN — HYDROMORPHONE HYDROCHLORIDE 0.5 MG: 2 INJECTION, SOLUTION INTRAMUSCULAR; INTRAVENOUS; SUBCUTANEOUS at 06:12

## 2022-12-05 RX ADMIN — METHOCARBAMOL 500 MG: 500 TABLET ORAL at 08:12

## 2022-12-05 RX ADMIN — LEVETIRACETAM 500 MG: 250 TABLET, FILM COATED ORAL at 11:12

## 2022-12-05 RX ADMIN — APIXABAN 2.5 MG: 2.5 TABLET, FILM COATED ORAL at 08:12

## 2022-12-05 RX ADMIN — TRAMADOL HYDROCHLORIDE 50 MG: 50 TABLET, COATED ORAL at 08:12

## 2022-12-05 RX ADMIN — METHOCARBAMOL 500 MG: 500 TABLET ORAL at 11:12

## 2022-12-05 RX ADMIN — GABAPENTIN 100 MG: 100 CAPSULE ORAL at 11:12

## 2022-12-05 RX ADMIN — DICYCLOMINE HYDROCHLORIDE 10 MG: 10 CAPSULE ORAL at 04:12

## 2022-12-05 RX ADMIN — SENNOSIDES AND DOCUSATE SODIUM 1 TABLET: 50; 8.6 TABLET ORAL at 11:12

## 2022-12-05 RX ADMIN — ISOSORBIDE MONONITRATE 120 MG: 60 TABLET, EXTENDED RELEASE ORAL at 11:12

## 2022-12-05 RX ADMIN — TRAMADOL HYDROCHLORIDE 50 MG: 50 TABLET, COATED ORAL at 11:12

## 2022-12-05 RX ADMIN — CARVEDILOL 25 MG: 25 TABLET, FILM COATED ORAL at 11:12

## 2022-12-05 RX ADMIN — APIXABAN 2.5 MG: 2.5 TABLET, FILM COATED ORAL at 11:12

## 2022-12-05 RX ADMIN — GABAPENTIN 100 MG: 100 CAPSULE ORAL at 08:12

## 2022-12-05 NOTE — ASSESSMENT & PLAN NOTE
Patient's FSGs are controlled on current medication regimen.  Last A1c reviewed-   Lab Results   Component Value Date    HGBA1C 6.2 08/24/2022     Most recent fingerstick glucose reviewed-   Recent Labs   Lab 12/04/22  1631 12/04/22 2023 12/05/22  0748 12/05/22  1139   POCTGLUCOSE 58* 113* 143* 166*     Current correctional scale  Medium  Maintain regimen:  Antihyperglycemics (From admission, onward)    Start     Stop Route Frequency Ordered    12/04/22 0900  insulin detemir U-100 pen 8 Units         -- SubQ Daily 12/03/22 2119    11/30/22 2240  insulin aspart U-100 pen 1-10 Units         -- SubQ Before meals & nightly PRN 11/30/22 2145        Hold Oral hypoglycemics while patient is in the hospital.  Adjust regimen for goal glucose <180

## 2022-12-05 NOTE — ASSESSMENT & PLAN NOTE
Chronic, controlled.  Latest blood pressure and vitals reviewed-   Temp:  [96.6 °F (35.9 °C)-98.4 °F (36.9 °C)]   Pulse:  [69-88]   Resp:  [14-20]   BP: (115-173)/()   SpO2:  [78 %-99 %] .   Home meds for hypertension were reviewed and noted below.   Hypertension Medications             amLODIPine (NORVASC) 10 MG tablet Take 1 tablet (10 mg total) by mouth once daily.    carvediloL (COREG) 25 MG tablet Take 1 tablet (25 mg total) by mouth 2 (two) times daily.    cloNIDine 0.2 mg/24 hr td ptwk (CATAPRES) 0.2 mg/24 hr Place 1 patch onto the skin every 7 days.    hydrALAZINE (APRESOLINE) 100 MG tablet Take 1 tablet (100 mg total) by mouth every 8 (eight) hours.    isosorbide mononitrate (IMDUR) 60 MG 24 hr tablet Take 2 tablets (120 mg total) by mouth once daily.      While in the hospital, will manage blood pressure as follows; Continue home antihypertensive regimen except for hydralazine and clonidine transdermal.    Will utilize p.r.n. blood pressure medication only if patient's blood pressure greater than  180/110 and she develops symptoms such as worsening chest pain or shortness of breath.

## 2022-12-05 NOTE — PROGRESS NOTES
EGD done and unremarkable for cause of symptoms.  Imaging shows no acute abnormality.  It is likely that her symptoms reflect a motility disturbance due to multiple chronic illnesses +/- gastroparesis.  Recommend clear liquid diet and advance as tolerated.  Recommend discontinue narcotics.  GI to sign off.

## 2022-12-05 NOTE — ASSESSMENT & PLAN NOTE
Patient's anemia is currently controlled. She received two units of PRBC on day of admission. Etiology likely d/t chronic kidney disease  Current CBC reviewed-   Lab Results   Component Value Date    HGB 7.9 (L) 12/05/2022    HCT 24.4 (L) 12/05/2022     Monitor serial CBC and transfuse if patient becomes hemodynamically unstable, symptomatic or H/H drops below 7/21.

## 2022-12-05 NOTE — PROGRESS NOTES
"Patient becomes very emotional at times, states "I have a lot of health problems." And she cries often. Will consult  for comfort, patient sleeping most of the time, she also has obvious sight issues, she has dialysis typically T,TH, SAT  "

## 2022-12-05 NOTE — CONSULTS
[facility]     CC: Abdominal pain, N/V    HPI 32 y.o. female with ESRD on HD, DM, HFpEF, GERD, suspected gastroparesis, sickle cell trait, HTN, who presented to the ED for evaluation of abdominal pain with nausea,vomiting and diarrhea. She has had multiple ED visits and admissions for the same complaint in the past, for intractable nausea and vomiting as well as for gastroparesis. She is a dialysis patient, with her last session performed Tuesday. She states she has had chest discomfort and abdominal bloating since then. She denies urinary complaint, blood in vomit or stool, fever, chills or other complaint.     ED work up included a CBC which showed chronic anemia and thrombocytopenia. CMP showed an elevated glucose of 587 with no evidence of acidosis. Liver enzymes are elevated from baseline today.  She was given a small fluid bolus in the ED, as well as one dose of morphine and several antiemetics. She reported relief after receiving droperidol. EKG was reviewed with no sign of acute ischemia or infarction. She was given IV insulin as well. Hospital Medicine consulted for admission and further management.        Overview/Hospital Course:  Pt was admitted with unrelenting abdominal pain and nausea.  Was given analgesics and anti-emetics.  The nausea became less intense.  However, the pain did not.  It remained significant.  Symptomatic treatment was continued.  She was kept on PPI.  We made sure her bowels kept moving.        Interval History:  continues with severe abdominal and lower back pain.  Requiring IV hydromorphone every six hours.  Nausea is less intense.  I saw her while on the dialysis machine today.    FURTHER HISTORY:  Above obtained from independent review of records from admitting provider as well as from direct discussion with primary team who states patient did not eat this morning but had a tray at bedside.  In addition, on my interview, I note the following:  Patient complains of  abdominal pain, onset a few days ago, ongoing, severe, associated with N/V, occurring in the setting of multiple medical problems, with no alleviating/exacerbating factors.  Denies bowel issues.  She has DM and ESRD on HD.  No history of EGD in the past.      Past Medical History:   Diagnosis Date    CKD (chronic kidney disease), stage IV 2022    Diabetes mellitus due to underlying condition with unspecified complications 2022    Gastroparesis 2022    Heart failure with preserved ejection fraction 2022    EF 55% on 3/22    History of gastroesophageal reflux (GERD)     History of supraventricular tachycardia     Hyperkalemia 2022    Hypertensive emergency 2022    Sickle cell trait 2022    Type 2 diabetes mellitus        Past Surgical History:   Procedure Laterality Date     SECTION      x 3    COLONOSCOPY      COLONOSCOPY N/A 2022    Procedure: COLONOSCOPY;  Surgeon: Jagdeep Cedeno MD;  Location: [facility];  Service: Endoscopy;  Laterality: N/A;    ESOPHAGOGASTRODUODENOSCOPY N/A 10/18/2019    Procedure: ESOPHAGOGASTRODUODENOSCOPY (EGD);  Surgeon: Gianluca Mendez MD;  Location: [facility];  Service: Endoscopy;  Laterality: N/A;    ESOPHAGOGASTRODUODENOSCOPY N/A 2022    Procedure: EGD (ESOPHAGOGASTRODUODENOSCOPY);  Surgeon: Micky Paredes III, MD;  Location: [facility];  Service: Endoscopy;  Laterality: N/A;    LAPAROSCOPIC CHOLECYSTECTOMY N/A 2022    Procedure: CHOLECYSTECTOMY, LAPAROSCOPIC;  Surgeon: Grey Perez MD;  Location: [facility];  Service: General;  Laterality: N/A;    PLACEMENT OF DUAL-LUMEN VASCULAR CATHETER Left 2022    Procedure: INSERTION-CATHETER-JOSEPH;  Surgeon: Dionte Gan MD;  Location: [facility] OR;  Service: General;  Laterality: Left;    PLACEMENT OF DUAL-LUMEN VASCULAR CATHETER Right 2022    Procedure: INSERTION-CATHETER-Hemosplit;  Surgeon: Dionte Gan MD;  Location: [facility] OR;  Service: General;  Laterality: Right;  "      Social History     Tobacco Use    Smoking status: Never    Smokeless tobacco: Never   Substance Use Topics    Alcohol use: Not Currently    Drug use: No       Family History   Problem Relation Age of Onset    Diabetes Mother     Diabetes Father        Review of Systems  General ROS: negative for - chills, fever or weight loss  Psychological ROS: negative for - hallucination, depression or suicidal ideation  Ophthalmic ROS: negative for - blurry vision, photophobia or eye pain  ENT ROS: negative for - epistaxis, sore throat or rhinorrhea  Respiratory ROS: no cough, shortness of breath, or wheezing  Cardiovascular ROS: no chest pain or dyspnea on exertion  Gastrointestinal ROS: as above  Genito-Urinary ROS: no dysuria, trouble voiding, or hematuria  Musculoskeletal ROS: negative for - arthralgia, myalgia, weakness  Neurological ROS: no syncope or seizures; no ataxia  Dermatological ROS: negative for pruritis, rash and jaundice    Physical Examination  BP (!) 173/102 (BP Location: Left arm, Patient Position: Lying)   Pulse 86   Temp 98.4 °F (36.9 °C) (Skin)   Resp 18   Ht 5' 2" (1.575 m)   Wt 59 kg (129 lb 15.7 oz)   LMP 12/21/2021 (Approximate)   SpO2 98%   Breastfeeding No   BMI 23.77 kg/m²   General appearance: alert, cooperative, appears uncomfortable  HENT: Normocephalic, atraumatic, neck symmetrical, no nasal discharge   Eyes: conjunctivae/corneas clear, PERRL, EOM's intact, sclera anicteric  Lungs: clear to auscultation bilaterally, no dullness to percussion bilaterally, symmetric expansion, breathing unlabored  Heart: regular rate and rhythm without rub; no displacement of the PMI   Abdomen: soft with TTP diffusely  Extremities: extremities symmetric; no clubbing, cyanosis, or edema  Integument: Skin color, texture, turgor normal; no rashes; hair distrubution normal, no jaundice  Neurologic: Alert and oriented X 3, no focal sensory or motor neurologic deficits  Psychiatric: no pressured speech; " normal affect; no evidence of impaired cognition, no anxiety/depression     Labs:  Lab Results   Component Value Date    WBC 4.67 12/05/2022    HGB 7.9 (L) 12/05/2022    HCT 24.4 (L) 12/05/2022    MCV 86 12/05/2022    PLT 70 (L) 12/05/2022       CMP  Sodium   Date Value Ref Range Status   12/05/2022 136 136 - 145 mmol/L Final     Potassium   Date Value Ref Range Status   12/05/2022 4.5 3.5 - 5.1 mmol/L Final     Chloride   Date Value Ref Range Status   12/05/2022 103 95 - 110 mmol/L Final     CO2   Date Value Ref Range Status   12/05/2022 25 23 - 29 mmol/L Final     Glucose   Date Value Ref Range Status   12/05/2022 139 (H) 70 - 110 mg/dL Final     BUN   Date Value Ref Range Status   12/05/2022 23 (H) 6 - 20 mg/dL Final     Creatinine   Date Value Ref Range Status   12/05/2022 4.5 (H) 0.5 - 1.4 mg/dL Final     Calcium   Date Value Ref Range Status   12/05/2022 8.9 8.7 - 10.5 mg/dL Final     Total Protein   Date Value Ref Range Status   12/05/2022 5.9 (L) 6.0 - 8.4 g/dL Final     Albumin   Date Value Ref Range Status   12/05/2022 2.7 (L) 3.5 - 5.2 g/dL Final     Total Bilirubin   Date Value Ref Range Status   12/05/2022 1.7 (H) 0.1 - 1.0 mg/dL Final     Comment:     For infants and newborns, interpretation of results should be based  on gestational age, weight and in agreement with clinical  observations.    Premature Infant recommended reference ranges:  Up to 24 hours.............<8.0 mg/dL  Up to 48 hours............<12.0 mg/dL  3-5 days..................<15.0 mg/dL  6-29 days.................<15.0 mg/dL       Alkaline Phosphatase   Date Value Ref Range Status   12/05/2022 309 (H) 55 - 135 U/L Final     AST   Date Value Ref Range Status   12/05/2022 27 10 - 40 U/L Final     ALT   Date Value Ref Range Status   12/05/2022 47 (H) 10 - 44 U/L Final     Anion Gap   Date Value Ref Range Status   12/05/2022 8 8 - 16 mmol/L Final     eGFR   Date Value Ref Range Status   12/05/2022 13 (A) >60 mL/min/1.73 m^2 Final          Imaging:  Recent CT was independently visualized and reviewed by me and showed left sided pyelonephritis, anasarca.    I have personally reviewed these images    Case discussed as above.    Assessment:  ESRD  DM  Abdominal pain  N/V    Plan:  Symptomatic management for nausea  Recommend minimize/discontinue narcotics as they are likely worsening the patient's motility which may be the reason for her symptoms.  PPI  EGD to further evaluate  Further recommendations to follow after above.  Communication will be sent to the referring provider regarding my assessment and plan on this patient via EPIC.    Marcelo Aponte MD  [facility]  [facility], [address]  [place] [address]  Office: [phone]  Fax: [phone]

## 2022-12-05 NOTE — CONSULTS
Nephrology Consult Note        Patient Name: Tabby Howard  MRN: [id number]    Patient Class: IP- Inpatient   Admission Date: 11/30/2022  Length of Stay: 1 days  Date of Service: 12/5/2022    Attending Physician: aMx Garcia MD  Primary Care Provider: [facility]    Reason for Consult: esrd/anemia/htn/shpt/abdominal pain    SUBJECTIVE:     HPI: 32F with ESRD on HD, DM, HFpEF, GERD, suspected gastroparesis, sickle cell trait, HTN, presented to the ED for evaluation of abdominal pain with nausea,vomiting and diarrhea. She has had multiple ED visits and admissions for intractable nausea and vomiting as well as for gastroparesis. Her last HD session performed Tuesday. She has chest discomfort and abdominal bloating since then.  NOTE:  this is her 14th admission in the last 6 months.       ED work up showed chronic anemia and thrombocytopenia, elevated glucose of 587 with no evidence of acidosis.  She was given a small fluid bolus in the ED, as well as one dose of morphine and several antiemetics. She reported relief after receiving droperidol. EKG was reviewed with no sign of acute ischemia or infarction. She was given IV insulin as well. Dialysis and blood transfusion was emergently performed.    12/2 VSS. HD tomorrow per schedule. Can be dc if stable.  12/3 HD today, had CLD- abd pain after- getting pain meds  12/4 no issues with HD yest- still with abd pain- no n/v  12/5  AFVSS.   Tolerates hemodialysis.      Past Medical History:   Diagnosis Date    CKD (chronic kidney disease), stage IV 4/12/2022    Diabetes mellitus due to underlying condition with unspecified complications 4/12/2022    Gastroparesis 4/12/2022    Heart failure with preserved ejection fraction 4/12/2022    EF 55% on 3/22    History of gastroesophageal reflux (GERD)     History of supraventricular tachycardia     Hyperkalemia 7/7/2022    Hypertensive emergency 7/8/2022    Sickle cell trait 4/12/2022    Type 2  diabetes mellitus      Past Surgical History:   Procedure Laterality Date     SECTION      x 3    COLONOSCOPY      COLONOSCOPY N/A 2022    Procedure: COLONOSCOPY;  Surgeon: Jagdeep Cedeno MD;  Location: [facility] ENDO;  Service: Endoscopy;  Laterality: N/A;    ESOPHAGOGASTRODUODENOSCOPY N/A 10/18/2019    Procedure: ESOPHAGOGASTRODUODENOSCOPY (EGD);  Surgeon: Gianluca Mendez MD;  Location: [facility] ENDO;  Service: Endoscopy;  Laterality: N/A;    ESOPHAGOGASTRODUODENOSCOPY N/A 2022    Procedure: EGD (ESOPHAGOGASTRODUODENOSCOPY);  Surgeon: Micky Paredes III, MD;  Location: [facility] ENDO;  Service: Endoscopy;  Laterality: N/A;    LAPAROSCOPIC CHOLECYSTECTOMY N/A 2022    Procedure: CHOLECYSTECTOMY, LAPAROSCOPIC;  Surgeon: Grey Perez MD;  Location: [facility] OR;  Service: General;  Laterality: N/A;    PLACEMENT OF DUAL-LUMEN VASCULAR CATHETER Left 2022    Procedure: INSERTION-CATHETER-JOSEPH;  Surgeon: Dionte Gan MD;  Location: [facility] OR;  Service: General;  Laterality: Left;    PLACEMENT OF DUAL-LUMEN VASCULAR CATHETER Right 2022    Procedure: INSERTION-CATHETER-Hemosplit;  Surgeon: Dionte Gan MD;  Location: [facility] OR;  Service: General;  Laterality: Right;     Family History   Problem Relation Age of Onset    Diabetes Mother     Diabetes Father      Social History     Tobacco Use    Smoking status: Never    Smokeless tobacco: Never   Substance Use Topics    Alcohol use: Not Currently    Drug use: No       Review of patient's allergies indicates:   Allergen Reactions    Penicillins Hives       Outpatient meds:  No current facility-administered medications on file prior to encounter.     Current Outpatient Medications on File Prior to Encounter   Medication Sig Dispense Refill    amLODIPine (NORVASC) 10 MG tablet Take 1 tablet (10 mg total) by mouth once daily. 30 tablet 11    apixaban (ELIQUIS) 5 mg Tab Take 1 tablet (5 mg total) by mouth 2 (two) times daily. 60 tablet 2     "carvediloL (COREG) 25 MG tablet Take 1 tablet (25 mg total) by mouth 2 (two) times daily. 60 tablet 2    cloNIDine 0.2 mg/24 hr td ptwk (CATAPRES) 0.2 mg/24 hr Place 1 patch onto the skin every 7 days. 4 patch 2    FLUoxetine 20 MG capsule Take 1 capsule (20 mg total) by mouth once daily. 30 capsule 11    gabapentin (NEURONTIN) 100 MG capsule Take 1 capsule (100 mg total) by mouth 2 (two) times daily. 90 capsule 2    insulin aspart U-100 (NOVOLOG) 100 unit/mL (3 mL) InPn pen Inject 1-10 Units into the skin before meals and at bedtime as needed (Hyperglycemia). Max daily dose 40 units. 3 mL 6    insulin detemir U-100 (LEVEMIR FLEXTOUCH) 100 unit/mL (3 mL) SubQ InPn pen Inject 5 Units into the skin once daily. Discard pen after 42 days. 6 mL 2    insulin glargine (LANTUS) 100 unit/mL injection Inject 5 Units into the skin every morning.      isosorbide mononitrate (IMDUR) 60 MG 24 hr tablet Take 2 tablets (120 mg total) by mouth once daily. 30 tablet 11    levETIRAcetam (KEPPRA) 500 MG Tab Take 1 tablet (500 mg total) by mouth 2 (two) times daily. 60 tablet 11    methocarbamoL (ROBAXIN) 500 MG Tab Take 1 tablet (500 mg total) by mouth 3 (three) times daily. 90 tablet 1    ondansetron (ZOFRAN) 4 MG tablet Take 1 tablet (4 mg total) by mouth every 8 (eight) hours as needed for Nausea. 60 tablet 2    pantoprazole (PROTONIX) 40 MG tablet Take 40 mg by mouth once daily.      pen needle, diabetic (BD ULTRA-FINE MAGALIE PEN NEEDLE) 32 gauge x 5/32" Ndle Inject into the skin 5 times per day with insulin 100 each 12    albuterol (PROVENTIL/VENTOLIN HFA) 90 mcg/actuation inhaler Inhale 2 puffs into the lungs every 6 (six) hours as needed for Wheezing. Rescue 18 g 3    hydrALAZINE (APRESOLINE) 100 MG tablet Take 1 tablet (100 mg total) by mouth every 8 (eight) hours. 90 tablet 2    [DISCONTINUED] atenoloL (TENORMIN) 50 MG tablet Take 1 tablet (50 mg total) by mouth every other day. 45 tablet 3    [DISCONTINUED] omeprazole " (PRILOSEC) 20 MG capsule Take 2 capsules (40 mg total) by mouth once daily. for 10 days 20 capsule 0    [DISCONTINUED] torsemide (DEMADEX) 20 MG Tab Take 1 tablet (20 mg total) by mouth 2 (two) times a day. (Patient taking differently: Take 20 mg by mouth once daily.) 60 tablet 1       Scheduled meds:   amLODIPine  10 mg Oral Daily    apixaban  2.5 mg Oral BID    carvediloL  25 mg Oral BID    dicyclomine  10 mg Oral QID    epoetin teresa-epbx  10,000 Units Subcutaneous Every Tues, Thurs, Sat    FLUoxetine  20 mg Oral Daily    gabapentin  100 mg Oral BID    insulin detemir U-100  8 Units Subcutaneous Daily    isosorbide mononitrate  120 mg Oral Daily    levETIRAcetam  500 mg Oral BID    methocarbamoL  500 mg Oral TID    mupirocin   Nasal BID    pantoprazole  40 mg Oral Daily    senna-docusate 8.6-50 mg  1 tablet Oral BID       Infusions:      PRN meds:  sodium chloride, acetaminophen, aluminum-magnesium hydroxide-simethicone, dextrose 10%, dextrose 10%, glucagon (human recombinant), glucose, glucose, HYDROmorphone, insulin aspart U-100, magnesium oxide, magnesium oxide, melatonin, naloxone, ondansetron, potassium bicarbonate, potassium bicarbonate, potassium bicarbonate, potassium, sodium phosphates, potassium, sodium phosphates, potassium, sodium phosphates, prochlorperazine, simethicone, sodium chloride 0.9%, sodium chloride 0.9%    Review of Systems:  Review of Systems   Constitutional:  Positive for malaise/fatigue. Negative for chills, fever and weight loss.   HENT:  Negative for hearing loss and nosebleeds.    Eyes:  Negative for blurred vision, double vision and photophobia.   Respiratory:  Negative for cough, shortness of breath and wheezing.    Cardiovascular:  Positive for chest pain. Negative for palpitations and leg swelling.   Gastrointestinal:  Positive for abdominal pain and nausea. Negative for constipation, diarrhea, heartburn and vomiting.   Genitourinary:  Negative for dysuria, frequency and urgency.    Musculoskeletal:  Negative for falls, joint pain and myalgias.   Skin:  Negative for itching and rash.   Neurological:  Positive for weakness. Negative for dizziness, speech change, focal weakness, loss of consciousness and headaches.   Endo/Heme/Allergies:  Does not bruise/bleed easily.   Psychiatric/Behavioral:  Negative for depression and substance abuse. The patient is not nervous/anxious.      OBJECTIVE:     Vital Signs and IO:  Temp:  [96.3 °F (35.7 °C)-98.2 °F (36.8 °C)]   Pulse:  [69-83]   Resp:  [14-19]   BP: (115-149)/(62-93)   SpO2:  [78 %-99 %]   I/O last 3 completed shifts:  In: 510 [P.O.:510]  Out: -   Wt Readings from Last 5 Encounters:   12/02/22 59 kg (129 lb 15.7 oz)   11/19/22 56 kg (123 lb 7.3 oz)   11/20/22 56 kg (123 lb 7.3 oz)   11/12/22 57.1 kg (125 lb 14.1 oz)   11/06/22 59.9 kg (132 lb)     Body mass index is 23.77 kg/m².    Physical Exam  Constitutional:       General: She is not in acute distress.     Appearance: She is well-developed. She is not diaphoretic.   HENT:      Head: Normocephalic and atraumatic.      Mouth/Throat:      Mouth: Mucous membranes are moist.   Eyes:      General: No scleral icterus.     Pupils: Pupils are equal, round, and reactive to light.   Cardiovascular:      Rate and Rhythm: Normal rate and regular rhythm.   Pulmonary:      Effort: Pulmonary effort is normal. No respiratory distress.      Breath sounds: No stridor.   Abdominal:      General: There is no distension.      Palpations: Abdomen is soft.   Musculoskeletal:         General: No deformity. Normal range of motion.      Cervical back: Neck supple.   Skin:     General: Skin is warm and dry.      Findings: No erythema or rash.   Neurological:      Mental Status: She is alert and oriented to person, place, and time.      Cranial Nerves: No cranial nerve deficit.   Psychiatric:         Behavior: Behavior normal.       Laboratory:  Recent Labs   Lab 12/02/22  0537 12/03/22  0456 12/04/22  0843     133* 136   K 4.0 4.7 3.8    100 102   CO2 25 24 25   BUN 23* 31* 15   CREATININE 3.6* 4.8* 3.3*   * 412* 218*         Recent Labs   Lab 12/01/22  0542 12/02/22  0537 12/03/22  0456 12/04/22  0843   CALCIUM 8.8 8.7 8.6* 8.8   ALBUMIN 2.8* 2.9* 2.8* 2.9*   PHOS 5.2* 3.6 3.7  --    MG 1.8 1.9 2.1 1.9         Recent Labs   Lab 07/08/22  0516   PTH, Intact 289.8 H         Recent Labs   Lab 12/03/22  0757 12/03/22  0800 12/03/22  1151 12/03/22  1725 12/03/22  2008 12/04/22  0720 12/04/22  1122 12/04/22  1631 12/04/22  2023 12/05/22  0748   POCTGLUCOSE 413* 372* 276* 91 158* 211* 82 58* 113* 143*         Recent Labs   Lab 05/15/22  1954 07/22/22  1653 08/24/22  0218   Hemoglobin A1C 5.8 H 6.0 H 6.2         Recent Labs   Lab 12/03/22  0456 12/04/22  0843 12/05/22  0813   WBC 5.29 5.18 4.67   HGB 8.7* 9.0* 7.9*   HCT 25.3* 26.3* 24.4*   * 87* 70*   MCV 82 82 86   MCHC 34.4 34.2 32.4   MONO 9.6  0.5 8.5  0.4 7.7  0.4         Recent Labs   Lab 12/02/22  0537 12/03/22  0456 12/04/22  0843   BILITOT 1.5* 1.0 1.4*   PROT 6.4 6.2 6.5   ALBUMIN 2.9* 2.8* 2.9*   ALKPHOS 321* 316* 347*   ALT 79* 62* 65*   AST 33 24 36         Recent Labs   Lab 10/16/22  1736 10/24/22  0107 11/19/22  1210   Color, UA Yellow Yellow Yellow   Appearance, UA Hazy A Clear Clear   pH, UA 7.0 8.0 8.0   Specific [place], UA 1.020 1.020 1.025   Protein, UA 4+ 4+ 4+   Glucose, UA 4+ A 4+ A 4+ A   Ketones, UA Trace A Negative Negative   Urobilinogen, UA Negative Negative Negative   Bilirubin (UA) Negative Negative 1+ A   Occult Blood UA 2+ A 2+ A 2+ A   Nitrite, UA Negative Negative Negative   RBC, UA 12 H 41 H 38 H   WBC, UA >100 H 24 H 25 H   Bacteria Negative Negative Negative   Hyaline Casts, UA 7 A 6 A 2 A         Recent Labs   Lab 10/04/22  0901 10/16/22  1643 11/30/22  1942   POC PH 7.378 7.398 7.434   POC PCO2 54.4 H 37.6 39.8   POC HCO3 32.0 H 23.2 L 26.6   POC PO2 26 L 55 52   POC SATURATED O2 44 L 88 L 87 L   POC BE 7 -2 2    Sample VENOUS VENOUS VENOUS         Microbiology Results (last 7 days)       ** No results found for the last 168 hours. **            ASSESSMENT/PLAN:     ESRD on HD TTS via AVF  Clearance parameters are at goal    Anemia of CKD  Transfused 2 PRBC on 12/1  Hb lower today.  Trending   Continue SHELLEY with HD    MBD / Secondary HPT  No acute binder needs    HTN  Controlled  UF with HD    Abdominal pain  GI consulted  EGD and Colonoscopy previously unremarkable  Agree with being judicious with opiate based analgesia.  This is her 14th admission in the last 6 months            Thank you for allowing us to participate in the care of your patient!   We will follow the patient and provide recommendations as needed.    Patient care time was spent personally by me on the following activities: > 35 min    Obtaining a history.  Examination of patient.  Providing medical care at the patients bedside.  Developing a treatment plan with patient or surrogate and bedside caregivers.  Ordering and reviewing laboratory studies, radiographic studies, pulse oximetry.  Ordering and performing treatments and interventions.  Evaluation of patient's response to treatment.  Discussions with consultants while on the unit and immediately available to the patient.  Re-evaluation of the patient's condition.  Documentation in the medical record.     Hugh Figueroa MD    [facility]  [address] [place] [address]  [place] [address]    [phone] - tel  [phone] - fax    12/5/2022

## 2022-12-05 NOTE — TRANSFER OF CARE
"Anesthesia Transfer of Care Note    Patient: Tabby Howard    Procedure(s) Performed: Procedure(s) (LRB):  EGD (ESOPHAGOGASTRODUODENOSCOPY) (N/A)    Patient location: GI    Anesthesia Type: general    Transport from OR: Transported from OR on room air with adequate spontaneous ventilation    Post pain: adequate analgesia    Post assessment: no apparent anesthetic complications and tolerated procedure well    Post vital signs: stable    Level of consciousness: awake, alert and oriented    Nausea/Vomiting: no nausea/vomiting    Complications: none    Transfer of care protocol was followed      Last vitals:   Visit Vitals  BP (!) 173/102 (BP Location: Left arm, Patient Position: Lying)   Pulse 86   Temp 36.9 °C (98.4 °F) (Skin)   Resp 18   Ht 5' 2" (1.575 m)   Wt 59 kg (129 lb 15.7 oz)   LMP 12/21/2021 (Approximate)   SpO2 98%   Breastfeeding No   BMI 23.77 kg/m²     "

## 2022-12-05 NOTE — PROGRESS NOTES
[facility] Hospital Medicine  Progress Note    Patient Name: Tabby Howard  MRN: [id number]  Patient Class: IP- Inpatient   Admission Date: 11/30/2022  Length of Stay: 1 days  Attending Physician: Max Garcia MD  Primary Care Provider: [facility]        Subjective:     Principal Problem:Intractable generalized abdominal pain        HPI:  Tabby Howard is a 32 year old female with ESRD on HD, DM, HFpEF, GERD, suspected gastroparesis, sickle cell trait, HTN, who presented to the ED for evaluation of abdominal pain with nausea,vomiting and diarrhea. She has had multiple ED visits and admissions for the same complaint in the past, for intractable nausea and vomiting as well as for gastroparesis. She is a dialysis patient, with her last session performed Tuesday. She states she has had chest discomfort and abdominal bloating since then. She denies urinary complaint, blood in vomit or stool, fever, chills or other complaint.    ED work up included a CBC which showed chronic anemia and thrombocytopenia. CMP showed an elevated glucose of 587 with no evidence of acidosis. Liver enzymes are elevated from baseline today.  She was given a small fluid bolus in the ED, as well as one dose of morphine and several antiemetics. She reported relief after receiving droperidol. EKG was reviewed with no sign of acute ischemia or infarction. She was given IV insulin as well. Hospital Medicine consulted for admission and further management.      Overview/Hospital Course:  Pt was admitted with unrelenting abdominal pain and nausea with intermittent vomiting.  Was given analgesics and anti-emetics.  The nausea became less intense and vomiting resolved.  However, the pain remained significant.  Symptomatic treatment was continued.  Suspected her symptoms were related to gastroparesis and likely gastritis as well. She was kept on PPI.  Reglan contraindicated as she has had a somewhat  recent seizure and is an anti-epileptic. We made sure her bowels kept moving on bowel regimen. Abd XR flat/erect without acute findings. Lipase WNL. Lactic acid and CRP WNL. Bentyl scheduled. GI cocktail with viscous lidocaine ordered. Took a clear liquid diet but did report some mildly increased pain. GI consulted and performed EGD which showed patchy areas of inflammation likely edema and gastritis. EGD biopsies taken. GI recommended discontinuation of her dilaudid as her symptoms were attributed to GI tract dysmotility related to her chronic conditions. Acetaminophen and tramadol were used in place of dilaudid to minimalize the effects of opiates on motility per GI recommendation. She continued with similar pain.      Interval History: Patient seen and examined. NAEON. No major changes in condition. Underwent EGD today with GI with mild gastritis noted but otherwise unremarkable procedure. She is upset her dilaudid was stopped.    Review of Systems   Constitutional:  Negative for chills and fever.   Respiratory:  Negative for cough and shortness of breath.    Cardiovascular:  Negative for chest pain and leg swelling.   Gastrointestinal:  Positive for abdominal pain. Negative for constipation, diarrhea, nausea and vomiting.   Musculoskeletal:  Positive for back pain.   Objective:     Vital Signs (Most Recent):  Temp: 98.4 °F (36.9 °C) (12/05/22 0924)  Pulse: 88 (12/05/22 1115)  Resp: 16 (12/05/22 1155)  BP: (!) 163/101 (12/05/22 1100)  SpO2: 96 % (12/05/22 1100)   Vital Signs (24h Range):  Temp:  [96.6 °F (35.9 °C)-98.4 °F (36.9 °C)] 98.4 °F (36.9 °C)  Pulse:  [69-88] 88  Resp:  [14-20] 16  SpO2:  [78 %-99 %] 96 %  BP: (115-173)/() 163/101     Weight: 59 kg (129 lb 15.7 oz)  Body mass index is 23.77 kg/m².    Intake/Output Summary (Last 24 hours) at 12/5/2022 1418  Last data filed at 12/5/2022 1048  Gross per 24 hour   Intake 280 ml   Output --   Net 280 ml      Physical Exam  Constitutional:       General:  She is not in acute distress.     Appearance: She is not ill-appearing.   HENT:      Head: Normocephalic and atraumatic.   Neck:      Vascular: No JVD.   Cardiovascular:      Rate and Rhythm: Normal rate and regular rhythm.   Pulmonary:      Effort: Pulmonary effort is normal.      Breath sounds: Normal breath sounds.   Abdominal:      General: Abdomen is flat. Bowel sounds are normal. There is no distension.      Palpations: Abdomen is soft.      Tenderness: There is generalized abdominal tenderness (only mildly exacerbating her pain, worse in epigastrium). There is no guarding or rebound.   Musculoskeletal:      Right lower leg: No edema.      Left lower leg: No edema.   Skin:     General: Skin is warm and moist.      Findings: No rash.   Neurological:      Mental Status: She is alert and oriented to person, place, and time.   Psychiatric:         Attention and Perception: Attention normal.         Mood and Affect: Affect normal.         Speech: Speech normal.      Comments: Uncomfortable affect       Significant Labs: All pertinent labs within the past 24 hours have been reviewed.    Significant Imaging: I have reviewed all pertinent imaging results/findings within the past 24 hours.      Assessment/Plan:      * Intractable generalized abdominal pain  Etiology is unclear - suspecting some aspect of gastritis and gastroparesis/dysmotility  There is some concern of pain med seeking   Recent CT abdomen showed no abnormality that would explain her pain  Abd XR flat/erect without acute findings  LA, CRP, and lipase WNL  One time dose GI cocktail with viscous lidocaine - minimal response  Not given reglan due to contraindication with recent seizure  - Continue analgesics - dilaudid changed to tramadol to minimalize opiate dysmotility  - Continue PPI  - Continue bentyl QID  - GI consult - EGD with signs of mild gastritis/edema, keep PPI, discontinue dilaudid, symptoms attributed to dysmotility from chronic conditions,  advance diet as tolerated, do not do gastric emptying study due to recent opiate use, signed off  - FLD ordered, adv as tolerated    Intractable nausea and vomiting  Improved  - Continue antiemetics prn  - FLD ordered and adv as tolerated    Anemia due to chronic kidney disease, on chronic dialysis  Patient's anemia is currently controlled. She received two units of PRBC on day of admission. Etiology likely d/t chronic kidney disease  Current CBC reviewed-   Lab Results   Component Value Date    HGB 7.9 (L) 12/05/2022    HCT 24.4 (L) 12/05/2022     Monitor serial CBC and transfuse if patient becomes hemodynamically unstable, symptomatic or H/H drops below 7/21.     Malnutrition of moderate degree  Nutrition consulted. Most recent weight and BMI monitored    Hypertension  Chronic, controlled.  Latest blood pressure and vitals reviewed-   Temp:  [96.6 °F (35.9 °C)-98.4 °F (36.9 °C)]   Pulse:  [69-88]   Resp:  [14-20]   BP: (115-173)/()   SpO2:  [78 %-99 %] .   Home meds for hypertension were reviewed and noted below.   Hypertension Medications             amLODIPine (NORVASC) 10 MG tablet Take 1 tablet (10 mg total) by mouth once daily.    carvediloL (COREG) 25 MG tablet Take 1 tablet (25 mg total) by mouth 2 (two) times daily.    cloNIDine 0.2 mg/24 hr td ptwk (CATAPRES) 0.2 mg/24 hr Place 1 patch onto the skin every 7 days.    hydrALAZINE (APRESOLINE) 100 MG tablet Take 1 tablet (100 mg total) by mouth every 8 (eight) hours.    isosorbide mononitrate (IMDUR) 60 MG 24 hr tablet Take 2 tablets (120 mg total) by mouth once daily.      While in the hospital, will manage blood pressure as follows; Continue home antihypertensive regimen except for hydralazine and clonidine transdermal.    Will utilize p.r.n. blood pressure medication only if patient's blood pressure greater than  180/110 and she develops symptoms such as worsening chest pain or shortness of breath.    Deep vein thrombosis (DVT) of non-extremity  vein  Noted, on eliquis    ESRD (end stage renal disease) on dialysis  Nephro following and dialyzing on schedule    Seizure  Noted, chronic  Continue keppra  Seizure precautions    Gastroparesis - suspected, unconfirmed        Type 2 diabetes mellitus with chronic kidney disease on chronic dialysis, with long-term current use of insulin  Patient's FSGs are controlled on current medication regimen.  Last A1c reviewed-   Lab Results   Component Value Date    HGBA1C 6.2 08/24/2022     Most recent fingerstick glucose reviewed-   Recent Labs   Lab 12/04/22  1631 12/04/22 2023 12/05/22  0748 12/05/22  1139   POCTGLUCOSE 58* 113* 143* 166*     Current correctional scale  Medium  Maintain regimen:  Antihyperglycemics (From admission, onward)    Start     Stop Route Frequency Ordered    12/04/22 0900  insulin detemir U-100 pen 8 Units         -- SubQ Daily 12/03/22 2119 11/30/22 2240  insulin aspart U-100 pen 1-10 Units         -- SubQ Before meals & nightly PRN 11/30/22 2145        Hold Oral hypoglycemics while patient is in the hospital.  Adjust regimen for goal glucose <180    Gastritis  Noted, chronic; may be exacerbated  Continue protonix      VTE Risk Mitigation (From admission, onward)         Ordered     apixaban tablet 2.5 mg  2 times daily         12/02/22 0915     Reason for No Pharmacological VTE Prophylaxis  Once        Question:  Reasons:  Answer:  Already adequately anticoagulated on oral Anticoagulants    11/30/22 2145     IP VTE HIGH RISK PATIENT  Once         11/30/22 2145     Place sequential compression device  Until discontinued         11/30/22 2145                Discharge Planning   TATIANA: 12/6-12/7     Code Status: Full Code   Is the patient medically ready for discharge?:     Reason for patient still in hospital (select all that apply): Patient trending condition  Discharge Plan A: Home with family        Max Garcia MD  Department of Hospital Medicine   [facility]

## 2022-12-05 NOTE — PROVATION PATIENT INSTRUCTIONS
Discharge Summary/Instructions after an Endoscopic Procedure  Patient Name: Tabby Howard  Patient MRN: [id number]  Patient YOB: 1990 Monday, December 5, 2022  Marcelo Aponte MD  Dear patient,  As a result of recent federal legislation (The Federal Cures Act), you may   receive lab or pathology results from your procedure in your [facility]   account before your physician is able to contact you. Your physician or   their representative will relay the results to you with their   recommendations at their soonest availability.  Thank you,  RESTRICTIONS:  During your procedure today, you received medications for sedation.  These   medications may affect your judgment, balance and coordination.  Therefore,   for 24 hours, you have the following restrictions:   - DO NOT drive a car, operate machinery, make legal/financial decisions,   sign important papers or drink alcohol.    ACTIVITY:  Today: no heavy lifting, straining or running due to procedural   sedation/anesthesia.  The following day: return to full activity including work.  DIET:  Eat and drink normally unless instructed otherwise.     TREATMENT FOR COMMON SIDE EFFECTS:  - Mild abdominal pain, nausea, belching, bloating or excessive gas:  rest,   eat lightly and use a heating pad.  - Sore Throat: treat with throat lozenges and/or gargle with warm salt   water.  - Because air was used during the procedure, expelling large amounts of air   from your rectum or belching is normal.  - If a bowel prep was taken, you may not have a bowel movement for 1-3 days.    This is normal.  SYMPTOMS TO WATCH FOR AND REPORT TO YOUR PHYSICIAN:  1. Abdominal pain or bloating, other than gas cramps.  2. Chest pain.  3. Back pain.  4. Signs of infection such as: chills or fever occurring within 24 hours   after the procedure.  5. Rectal bleeding, which would show as bright red, maroon, or black stools.   (A tablespoon of blood from the rectum is not serious, especially  if   hemorrhoids are present.)  6. Vomiting.  7. Weakness or dizziness.  GO DIRECTLY TO THE NEAREST EMERGENCY ROOM IF YOU HAVE ANY OF THE FOLLOWING:      Difficulty breathing              Chills and/or fever over 101 F   Persistent vomiting and/or vomiting blood   Severe abdominal pain   Severe chest pain   Black, tarry stools   Bleeding- more than one tablespoon   Any other symptom or condition that you feel may need urgent attention  Your doctor recommends these additional instructions:  If any biopsies were taken, your doctors clinic will contact you in 1 to 2   weeks with any results.  - Return patient to hospital [facility] for ongoing care.   - Advance diet as tolerated and clear liquid diet.   - Continue present medications.   - Await pathology results.   - No aspirin, ibuprofen, naproxen, or other non-steroidal anti-inflammatory   drugs.   - Follow an antireflux regimen.  For questions, problems or results please call your physician - Marcelo Aponte MD at Work:  [phone].  [facility], EMERGENCY ROOM PHONE NUMBER: [phone]  IF A COMPLICATION OR EMERGENCY SITUATION ARISES AND YOU ARE UNABLE TO REACH   YOUR PHYSICIAN - GO DIRECTLY TO THE EMERGENCY ROOM.  Marcelo Aponte MD  12/5/2022 10:35:12 AM  This report has been verified and signed electronically.  Dear patient,  As a result of recent federal legislation (The Federal Cures Act), you may   receive lab or pathology results from your procedure in your [facility]   account before your physician is able to contact you. Your physician or   their representative will relay the results to you with their   recommendations at their soonest availability.  Thank you,  PROVATION

## 2022-12-05 NOTE — PLAN OF CARE
Plan of care reviewed with patient. Patient verbalized complete understanding. Pt awake, alert, and oriented. Pt complaining of pain, meds given. Pt complaining of nausea, meds given. Pt IV CDI. Pt blood glucose monitored.  All fall precautions maintained, bed in lowest position, locked, call light within reach. Side rails up times 2. Slip resistant socks maintained.

## 2022-12-05 NOTE — ANESTHESIA PREPROCEDURE EVALUATION
12/05/2022  Tabby Howard is a 32 y.o., female.      Pre-op Assessment    I have reviewed the Patient Summary Reports.     I have reviewed the Nursing Notes. I have reviewed the NPO Status.   I have reviewed the Medications.     Review of Systems  Anesthesia Hx:  Denies Family Hx of Anesthesia complications.   Denies Personal Hx of Anesthesia complications.   Hematology/Oncology:         -- Anemia:   Cardiovascular:   Hypertension, poorly controlled ECG has been reviewed. Hypertensive heart Ds/CHF, pericardial effusion small/stable, h/o DVT   Renal/:   Chronic Renal Disease, ESRD    Neurological:   Seizures, well controlled    Endocrine:   Diabetes, using insulin        Physical Exam  General: Cooperative, Alert and Oriented    Airway:  Mallampati: I   Mouth Opening: Normal  TM Distance: Normal  Tongue: Normal  Neck ROM: Normal ROM    Chest/Lungs:  Normal Respiratory Rate    Heart:  Rate: Normal        Anesthesia Plan  Type of Anesthesia, risks & benefits discussed:    Anesthesia Type: Gen Natural Airway  Intra-op Monitoring Plan: Standard ASA Monitors  Induction:  IV  Informed Consent: Informed consent signed with the Patient and all parties understand the risks and agree with anesthesia plan.  All questions answered.   ASA Score: 3    Ready For Surgery From Anesthesia Perspective.     .

## 2022-12-05 NOTE — SUBJECTIVE & OBJECTIVE
Interval History: Patient seen and examined. NAEON. No major changes in condition. Underwent EGD today with GI with mild gastritis noted but otherwise unremarkable procedure. She is upset her dilaudid was stopped.    Review of Systems   Constitutional:  Negative for chills and fever.   Respiratory:  Negative for cough and shortness of breath.    Cardiovascular:  Negative for chest pain and leg swelling.   Gastrointestinal:  Positive for abdominal pain. Negative for constipation, diarrhea, nausea and vomiting.   Musculoskeletal:  Positive for back pain.   Objective:     Vital Signs (Most Recent):  Temp: 98.4 °F (36.9 °C) (12/05/22 0924)  Pulse: 88 (12/05/22 1115)  Resp: 16 (12/05/22 1155)  BP: (!) 163/101 (12/05/22 1100)  SpO2: 96 % (12/05/22 1100)   Vital Signs (24h Range):  Temp:  [96.6 °F (35.9 °C)-98.4 °F (36.9 °C)] 98.4 °F (36.9 °C)  Pulse:  [69-88] 88  Resp:  [14-20] 16  SpO2:  [78 %-99 %] 96 %  BP: (115-173)/() 163/101     Weight: 59 kg (129 lb 15.7 oz)  Body mass index is 23.77 kg/m².    Intake/Output Summary (Last 24 hours) at 12/5/2022 1418  Last data filed at 12/5/2022 1048  Gross per 24 hour   Intake 280 ml   Output --   Net 280 ml      Physical Exam  Constitutional:       General: She is not in acute distress.     Appearance: She is not ill-appearing.   HENT:      Head: Normocephalic and atraumatic.   Neck:      Vascular: No JVD.   Cardiovascular:      Rate and Rhythm: Normal rate and regular rhythm.   Pulmonary:      Effort: Pulmonary effort is normal.      Breath sounds: Normal breath sounds.   Abdominal:      General: Abdomen is flat. Bowel sounds are normal. There is no distension.      Palpations: Abdomen is soft.      Tenderness: There is generalized abdominal tenderness (only mildly exacerbating her pain, worse in epigastrium). There is no guarding or rebound.   Musculoskeletal:      Right lower leg: No edema.      Left lower leg: No edema.   Skin:     General: Skin is warm and moist.       Findings: No rash.   Neurological:      Mental Status: She is alert and oriented to person, place, and time.   Psychiatric:         Attention and Perception: Attention normal.         Mood and Affect: Affect normal.         Speech: Speech normal.      Comments: Uncomfortable affect       Significant Labs: All pertinent labs within the past 24 hours have been reviewed.    Significant Imaging: I have reviewed all pertinent imaging results/findings within the past 24 hours.

## 2022-12-05 NOTE — ANESTHESIA POSTPROCEDURE EVALUATION
Anesthesia Post Evaluation    Patient: Tabby Howard    Procedure(s) Performed: Procedure(s) (LRB):  EGD (ESOPHAGOGASTRODUODENOSCOPY) (N/A)    Final Anesthesia Type: general      Patient location during evaluation: PACU  Patient participation: Yes- Able to Participate  Level of consciousness: awake and alert  Post-procedure vital signs: reviewed and stable  Pain management: adequate  Airway patency: patent    PONV status at discharge: No PONV  Anesthetic complications: no      Cardiovascular status: blood pressure returned to baseline and hypertensive  Respiratory status: unassisted  Hydration status: euvolemic  Follow-up not needed.          Vitals Value Taken Time   /101 12/05/22 1100   Temp   12/05/22 1214   Pulse 88 12/05/22 1115   Resp 16 12/05/22 1155   SpO2 96 % 12/05/22 1100         Event Time   Out of Recovery 12/05/2022 11:15:00         Pain/Lien Score: Pain Rating Prior to Med Admin: 10 (12/5/2022 11:55 AM)  Pain Rating Post Med Admin: 3 (12/5/2022  6:26 AM)  Modified Lien Score: 20 (12/5/2022 11:05 AM)

## 2022-12-06 LAB
ABO + RH BLD: NORMAL
ALBUMIN SERPL BCP-MCNC: 2.8 G/DL (ref 3.5–5.2)
ALP SERPL-CCNC: 307 U/L (ref 55–135)
ALT SERPL W/O P-5'-P-CCNC: 44 U/L (ref 10–44)
ANION GAP SERPL CALC-SCNC: 11 MMOL/L (ref 8–16)
AST SERPL-CCNC: 27 U/L (ref 10–40)
BASOPHILS # BLD AUTO: 0.02 K/UL (ref 0–0.2)
BASOPHILS NFR BLD: 0.4 % (ref 0–1.9)
BILIRUB SERPL-MCNC: 1.4 MG/DL (ref 0.1–1)
BLD GP AB SCN CELLS X3 SERPL QL: NORMAL
BLD PROD TYP BPU: NORMAL
BLD PROD TYP BPU: NORMAL
BLOOD UNIT EXPIRATION DATE: NORMAL
BLOOD UNIT EXPIRATION DATE: NORMAL
BLOOD UNIT TYPE CODE: 600
BLOOD UNIT TYPE CODE: 6200
BLOOD UNIT TYPE: NORMAL
BLOOD UNIT TYPE: NORMAL
BUN SERPL-MCNC: 32 MG/DL (ref 6–20)
CALCIUM SERPL-MCNC: 8.3 MG/DL (ref 8.7–10.5)
CHLORIDE SERPL-SCNC: 101 MMOL/L (ref 95–110)
CO2 SERPL-SCNC: 22 MMOL/L (ref 23–29)
CODING SYSTEM: NORMAL
CODING SYSTEM: NORMAL
CREAT SERPL-MCNC: 5.5 MG/DL (ref 0.5–1.4)
DIFFERENTIAL METHOD: ABNORMAL
DISPENSE STATUS: NORMAL
DISPENSE STATUS: NORMAL
EOSINOPHIL # BLD AUTO: 0.1 K/UL (ref 0–0.5)
EOSINOPHIL NFR BLD: 2.4 % (ref 0–8)
ERYTHROCYTE [DISTWIDTH] IN BLOOD BY AUTOMATED COUNT: 15.9 % (ref 11.5–14.5)
EST. GFR  (NO RACE VARIABLE): 10 ML/MIN/1.73 M^2
GLUCOSE SERPL-MCNC: 423 MG/DL (ref 70–110)
HCT VFR BLD AUTO: 23.1 % (ref 37–48.5)
HGB BLD-MCNC: 7.7 G/DL (ref 12–16)
IMM GRANULOCYTES # BLD AUTO: 0.02 K/UL (ref 0–0.04)
IMM GRANULOCYTES NFR BLD AUTO: 0.4 % (ref 0–0.5)
LYMPHOCYTES # BLD AUTO: 0.8 K/UL (ref 1–4.8)
LYMPHOCYTES NFR BLD: 17.6 % (ref 18–48)
MCH RBC QN AUTO: 28.2 PG (ref 27–31)
MCHC RBC AUTO-ENTMCNC: 33.3 G/DL (ref 32–36)
MCV RBC AUTO: 85 FL (ref 82–98)
MONOCYTES # BLD AUTO: 0.4 K/UL (ref 0.3–1)
MONOCYTES NFR BLD: 9.5 % (ref 4–15)
NEUTROPHILS # BLD AUTO: 3.2 K/UL (ref 1.8–7.7)
NEUTROPHILS NFR BLD: 69.7 % (ref 38–73)
NRBC BLD-RTO: 0 /100 WBC
NUM UNITS TRANS PACKED RBC: NORMAL
NUM UNITS TRANS PACKED RBC: NORMAL
PLATELET # BLD AUTO: 72 K/UL (ref 150–450)
PMV BLD AUTO: 8.5 FL (ref 9.2–12.9)
POCT GLUCOSE: 161 MG/DL (ref 70–110)
POCT GLUCOSE: 192 MG/DL (ref 70–110)
POCT GLUCOSE: 267 MG/DL (ref 70–110)
POCT GLUCOSE: 297 MG/DL (ref 70–110)
POCT GLUCOSE: 423 MG/DL (ref 70–110)
POTASSIUM SERPL-SCNC: 5.3 MMOL/L (ref 3.5–5.1)
PROT SERPL-MCNC: 6 G/DL (ref 6–8.4)
RBC # BLD AUTO: 2.73 M/UL (ref 4–5.4)
SODIUM SERPL-SCNC: 134 MMOL/L (ref 136–145)
TROPONIN I SERPL DL<=0.01 NG/ML-MCNC: 0.02 NG/ML (ref 0–0.03)
WBC # BLD AUTO: 4.61 K/UL (ref 3.9–12.7)

## 2022-12-06 PROCEDURE — 86901 BLOOD TYPING SEROLOGIC RH(D): CPT | Performed by: STUDENT IN AN ORGANIZED HEALTH CARE EDUCATION/TRAINING PROGRAM

## 2022-12-06 PROCEDURE — 63600175 PHARM REV CODE 636 W HCPCS: Performed by: STUDENT IN AN ORGANIZED HEALTH CARE EDUCATION/TRAINING PROGRAM

## 2022-12-06 PROCEDURE — 36415 COLL VENOUS BLD VENIPUNCTURE: CPT | Performed by: STUDENT IN AN ORGANIZED HEALTH CARE EDUCATION/TRAINING PROGRAM

## 2022-12-06 PROCEDURE — 27000221 HC OXYGEN, UP TO 24 HOURS

## 2022-12-06 PROCEDURE — 63600175 PHARM REV CODE 636 W HCPCS: Performed by: NURSE PRACTITIONER

## 2022-12-06 PROCEDURE — 93010 ELECTROCARDIOGRAM REPORT: CPT | Mod: 76,,, | Performed by: GENERAL PRACTICE

## 2022-12-06 PROCEDURE — 93010 EKG 12-LEAD: ICD-10-PCS | Mod: 76,,, | Performed by: GENERAL PRACTICE

## 2022-12-06 PROCEDURE — 25000003 PHARM REV CODE 250: Performed by: HOSPITALIST

## 2022-12-06 PROCEDURE — 63600175 PHARM REV CODE 636 W HCPCS: Performed by: INTERNAL MEDICINE

## 2022-12-06 PROCEDURE — 93005 ELECTROCARDIOGRAM TRACING: CPT

## 2022-12-06 PROCEDURE — 93010 EKG 12-LEAD: ICD-10-PCS | Mod: ,,, | Performed by: GENERAL PRACTICE

## 2022-12-06 PROCEDURE — 94761 N-INVAS EAR/PLS OXIMETRY MLT: CPT

## 2022-12-06 PROCEDURE — 99222 1ST HOSP IP/OBS MODERATE 55: CPT | Mod: 95,AF,HB, | Performed by: PSYCHIATRY & NEUROLOGY

## 2022-12-06 PROCEDURE — 99222 PR INITIAL HOSPITAL CARE,LEVL II: ICD-10-PCS | Mod: 95,AF,HB, | Performed by: PSYCHIATRY & NEUROLOGY

## 2022-12-06 PROCEDURE — 25000003 PHARM REV CODE 250: Performed by: NURSE PRACTITIONER

## 2022-12-06 PROCEDURE — 25000003 PHARM REV CODE 250: Performed by: STUDENT IN AN ORGANIZED HEALTH CARE EDUCATION/TRAINING PROGRAM

## 2022-12-06 PROCEDURE — 85025 COMPLETE CBC W/AUTO DIFF WBC: CPT | Performed by: STUDENT IN AN ORGANIZED HEALTH CARE EDUCATION/TRAINING PROGRAM

## 2022-12-06 PROCEDURE — 84484 ASSAY OF TROPONIN QUANT: CPT | Performed by: STUDENT IN AN ORGANIZED HEALTH CARE EDUCATION/TRAINING PROGRAM

## 2022-12-06 PROCEDURE — 80053 COMPREHEN METABOLIC PANEL: CPT | Performed by: STUDENT IN AN ORGANIZED HEALTH CARE EDUCATION/TRAINING PROGRAM

## 2022-12-06 PROCEDURE — 93010 ELECTROCARDIOGRAM REPORT: CPT | Mod: ,,, | Performed by: GENERAL PRACTICE

## 2022-12-06 PROCEDURE — 80100014 HC HEMODIALYSIS 1:1

## 2022-12-06 PROCEDURE — P9016 RBC LEUKOCYTES REDUCED: HCPCS | Performed by: INTERNAL MEDICINE

## 2022-12-06 PROCEDURE — 12000002 HC ACUTE/MED SURGE SEMI-PRIVATE ROOM

## 2022-12-06 PROCEDURE — 86920 COMPATIBILITY TEST SPIN: CPT | Performed by: INTERNAL MEDICINE

## 2022-12-06 RX ORDER — SODIUM CHLORIDE 9 MG/ML
INJECTION, SOLUTION INTRAVENOUS ONCE
Status: CANCELLED | OUTPATIENT
Start: 2022-12-06 | End: 2022-12-06

## 2022-12-06 RX ORDER — MIRTAZAPINE 15 MG/1
15 TABLET, FILM COATED ORAL NIGHTLY
Status: DISCONTINUED | OUTPATIENT
Start: 2022-12-06 | End: 2022-12-07 | Stop reason: HOSPADM

## 2022-12-06 RX ORDER — INSULIN ASPART 100 [IU]/ML
8 INJECTION, SOLUTION INTRAVENOUS; SUBCUTANEOUS ONCE
Status: COMPLETED | OUTPATIENT
Start: 2022-12-06 | End: 2022-12-06

## 2022-12-06 RX ORDER — INSULIN ASPART 100 [IU]/ML
8 INJECTION, SOLUTION INTRAVENOUS; SUBCUTANEOUS ONCE
Status: DISCONTINUED | OUTPATIENT
Start: 2022-12-06 | End: 2022-12-06

## 2022-12-06 RX ORDER — OXYCODONE AND ACETAMINOPHEN 5; 325 MG/1; MG/1
1 TABLET ORAL ONCE
Status: COMPLETED | OUTPATIENT
Start: 2022-12-06 | End: 2022-12-06

## 2022-12-06 RX ORDER — LABETALOL HYDROCHLORIDE 5 MG/ML
10 INJECTION, SOLUTION INTRAVENOUS EVERY 6 HOURS PRN
Status: DISCONTINUED | OUTPATIENT
Start: 2022-12-06 | End: 2022-12-07 | Stop reason: HOSPADM

## 2022-12-06 RX ORDER — DROPERIDOL 2.5 MG/ML
0.62 INJECTION, SOLUTION INTRAMUSCULAR; INTRAVENOUS ONCE
Status: COMPLETED | OUTPATIENT
Start: 2022-12-06 | End: 2022-12-06

## 2022-12-06 RX ORDER — HYDROCODONE BITARTRATE AND ACETAMINOPHEN 500; 5 MG/1; MG/1
TABLET ORAL
Status: DISCONTINUED | OUTPATIENT
Start: 2022-12-06 | End: 2022-12-07 | Stop reason: HOSPADM

## 2022-12-06 RX ORDER — SODIUM CHLORIDE 9 MG/ML
INJECTION, SOLUTION INTRAVENOUS
Status: CANCELLED | OUTPATIENT
Start: 2022-12-06

## 2022-12-06 RX ADMIN — LABETALOL HYDROCHLORIDE 10 MG: 5 INJECTION, SOLUTION INTRAVENOUS at 04:12

## 2022-12-06 RX ADMIN — INSULIN DETEMIR 8 UNITS: 100 INJECTION, SOLUTION SUBCUTANEOUS at 10:12

## 2022-12-06 RX ADMIN — DICYCLOMINE HYDROCHLORIDE 10 MG: 10 CAPSULE ORAL at 04:12

## 2022-12-06 RX ADMIN — APIXABAN 2.5 MG: 2.5 TABLET, FILM COATED ORAL at 08:12

## 2022-12-06 RX ADMIN — GABAPENTIN 100 MG: 100 CAPSULE ORAL at 10:12

## 2022-12-06 RX ADMIN — DICYCLOMINE HYDROCHLORIDE 10 MG: 10 CAPSULE ORAL at 12:12

## 2022-12-06 RX ADMIN — FLUOXETINE 20 MG: 20 CAPSULE ORAL at 08:12

## 2022-12-06 RX ADMIN — INSULIN ASPART 8 UNITS: 100 INJECTION, SOLUTION INTRAVENOUS; SUBCUTANEOUS at 10:12

## 2022-12-06 RX ADMIN — LEVETIRACETAM 500 MG: 250 TABLET, FILM COATED ORAL at 10:12

## 2022-12-06 RX ADMIN — LABETALOL HYDROCHLORIDE 10 MG: 5 INJECTION, SOLUTION INTRAVENOUS at 10:12

## 2022-12-06 RX ADMIN — METHOCARBAMOL 500 MG: 500 TABLET ORAL at 10:12

## 2022-12-06 RX ADMIN — INSULIN ASPART 3 UNITS: 100 INJECTION, SOLUTION INTRAVENOUS; SUBCUTANEOUS at 10:12

## 2022-12-06 RX ADMIN — CARVEDILOL 25 MG: 25 TABLET, FILM COATED ORAL at 10:12

## 2022-12-06 RX ADMIN — DROPERIDOL 0.62 MG: 2.5 INJECTION, SOLUTION INTRAMUSCULAR; INTRAVENOUS at 02:12

## 2022-12-06 RX ADMIN — MIRTAZAPINE 15 MG: 15 TABLET, FILM COATED ORAL at 10:12

## 2022-12-06 RX ADMIN — OXYCODONE HYDROCHLORIDE AND ACETAMINOPHEN 1 TABLET: 5; 325 TABLET ORAL at 02:12

## 2022-12-06 RX ADMIN — DROPERIDOL 0.62 MG: 2.5 INJECTION, SOLUTION INTRAMUSCULAR; INTRAVENOUS at 10:12

## 2022-12-06 RX ADMIN — TRAMADOL HYDROCHLORIDE 50 MG: 50 TABLET, COATED ORAL at 02:12

## 2022-12-06 RX ADMIN — INSULIN ASPART 10 UNITS: 100 INJECTION, SOLUTION INTRAVENOUS; SUBCUTANEOUS at 09:12

## 2022-12-06 RX ADMIN — METHOCARBAMOL 500 MG: 500 TABLET ORAL at 02:12

## 2022-12-06 RX ADMIN — DICYCLOMINE HYDROCHLORIDE 10 MG: 10 CAPSULE ORAL at 08:12

## 2022-12-06 RX ADMIN — LEVETIRACETAM 500 MG: 250 TABLET, FILM COATED ORAL at 08:12

## 2022-12-06 RX ADMIN — GABAPENTIN 100 MG: 100 CAPSULE ORAL at 08:12

## 2022-12-06 RX ADMIN — ISOSORBIDE MONONITRATE 120 MG: 60 TABLET, EXTENDED RELEASE ORAL at 08:12

## 2022-12-06 RX ADMIN — APIXABAN 2.5 MG: 2.5 TABLET, FILM COATED ORAL at 10:12

## 2022-12-06 RX ADMIN — INSULIN ASPART 2 UNITS: 100 INJECTION, SOLUTION INTRAVENOUS; SUBCUTANEOUS at 11:12

## 2022-12-06 RX ADMIN — AMLODIPINE BESYLATE 10 MG: 5 TABLET ORAL at 12:12

## 2022-12-06 RX ADMIN — PANTOPRAZOLE SODIUM 40 MG: 40 TABLET, DELAYED RELEASE ORAL at 08:12

## 2022-12-06 RX ADMIN — TRAMADOL HYDROCHLORIDE 50 MG: 50 TABLET, COATED ORAL at 06:12

## 2022-12-06 RX ADMIN — METHOCARBAMOL 500 MG: 500 TABLET ORAL at 08:12

## 2022-12-06 RX ADMIN — DICYCLOMINE HYDROCHLORIDE 10 MG: 10 CAPSULE ORAL at 10:12

## 2022-12-06 RX ADMIN — EPOETIN ALFA-EPBX 10000 UNITS: 10000 INJECTION, SOLUTION INTRAVENOUS; SUBCUTANEOUS at 10:12
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
## 2022-12-06 NOTE — ASSESSMENT & PLAN NOTE
Chronic, controlled.  Latest blood pressure and vitals reviewed- some elevated readings during dialysis  Temp:  [97 °F (36.1 °C)-98.5 °F (36.9 °C)]   Pulse:  [77-88]   Resp:  [16-19]   BP: (120-207)/()   SpO2:  [86 %-97 %] .   Home meds for hypertension were reviewed and noted below.   Hypertension Medications             amLODIPine (NORVASC) 10 MG tablet Take 1 tablet (10 mg total) by mouth once daily.    carvediloL (COREG) 25 MG tablet Take 1 tablet (25 mg total) by mouth 2 (two) times daily.    cloNIDine 0.2 mg/24 hr td ptwk (CATAPRES) 0.2 mg/24 hr Place 1 patch onto the skin every 7 days.    hydrALAZINE (APRESOLINE) 100 MG tablet Take 1 tablet (100 mg total) by mouth every 8 (eight) hours.    isosorbide mononitrate (IMDUR) 60 MG 24 hr tablet Take 2 tablets (120 mg total) by mouth once daily.      While in the hospital, will manage blood pressure as follows; Continue home antihypertensive regimen except for hydralazine and clonidine transdermal.    Will utilize p.r.n. blood pressure medication only if patient's blood pressure greater than  180/110 and she develops symptoms such as worsening chest pain or shortness of breath.

## 2022-12-06 NOTE — ASSESSMENT & PLAN NOTE
Patient's anemia is currently uncontrolled. She received two units of PRBC on day of admission. Another two units ordered 12/6. Etiology likely d/t chronic kidney disease  Current CBC reviewed-   Lab Results   Component Value Date    HGB 7.7 (L) 12/06/2022    HCT 23.1 (L) 12/06/2022     Monitor serial CBC and transfuse if patient becomes hemodynamically unstable, symptomatic or H/H drops below 7/21.

## 2022-12-06 NOTE — ASSESSMENT & PLAN NOTE
Patient's FSGs are controlled on current medication regimen.  Last A1c reviewed-   Lab Results   Component Value Date    HGBA1C 6.2 08/24/2022     Most recent fingerstick glucose reviewed-   Recent Labs   Lab 12/06/22  0821 12/06/22  1002 12/06/22  1055 12/06/22  1156   POCTGLUCOSE 423* 297* 192* 161*     Current correctional scale  Medium  Maintain regimen:  Antihyperglycemics (From admission, onward)    Start     Stop Route Frequency Ordered    12/04/22 0900  insulin detemir U-100 pen 8 Units         -- SubQ Daily 12/03/22 2119 11/30/22 2240  insulin aspart U-100 pen 1-10 Units         -- SubQ Before meals & nightly PRN 11/30/22 2145        Hold Oral hypoglycemics while patient is in the hospital.  Adjust regimen for goal glucose <180

## 2022-12-06 NOTE — PLAN OF CARE
12/05/22 3512   Patient Assessment/Suction   Level of Consciousness (AVPU) alert   Respiratory Effort Normal;Unlabored   Expansion/Accessory Muscles/Retractions no retractions;no use of accessory muscles   Rhythm/Pattern, Respiratory depth regular;pattern regular;unlabored   Cough Frequency no cough   PRE-TX-O2   O2 Device (Oxygen Therapy) (S)  nasal cannula  (sats were 87 on ra placed back on o2)   $ Is the patient on Low Flow Oxygen? Yes   Flow (L/min) 1   SpO2 (!) 94 %   Pulse Oximetry Type Intermittent   $ Pulse Oximetry - Multiple Charge Pulse Oximetry - Multiple   Pulse 88   Resp 18

## 2022-12-06 NOTE — PROGRESS NOTES
HD tx tolerated well  Tx time extended to 4 hr d/t PRBC administration per MD  2 units PRBC administered   Net UF 3.5 liters       12/06/22 1310   Handoff Report   Received From Stephanie Mena   Given To Kaykay   Vital Signs   Temp 97.6 °F (36.4 °C)   Temp src Oral   Pulse 80   Resp 18   BP (!) 189/91   Assessments (Pre/Post)   Blood Liters Processed (BLP) 77   Transport Modality not applicable   Level of Consciousness (AVPU) alert   Dialyzer Clearance moderately streaked        Hemodialysis Catheter 07/26/22 1457 right internal jugular   Placement Date/Time: 07/26/22 1457   Present Prior to Hospital Arrival?: No  Hand Hygiene: Performed  Barrier Precautions: Performed  Skin Antisepsis: ChloraPrep  Hemodialysis Catheter Type: Tunneled catheter  Location: right internal jugular  Cathete...   Line Necessity Review CRRT/HD   Site Assessment No drainage;No redness;No swelling;No warmth   Line Securement Device Secured with sutures   Dressing Type Central line dressing   Dressing Status Clean;Dry;Intact   Dressing Intervention Sterile dressing change   Date on Dressing 12/06/22   Dressing Due to be Changed 12/13/22   Venous Patency/Care flushed w/o difficulty;normal saline locked   Arterial Patency/Care flushed w/o difficulty;normal saline locked   Post-Hemodialysis Assessment   Rinseback Volume (mL) 250 mL   Blood Volume Processed (Liters) 77 L   Dialyzer Clearance Moderately streaked   Duration of Treatment 240 minutes   Additional Fluid Intake (mL) 250 mL   Total UF (mL) 4570 mL   Net Fluid Removal 3500   Patient Response to Treatment Tolerated well   Post-Treatment Weight 61.2 kg (134 lb 14.7 oz)   Treatment Weight Change -3.5   Post-Hemodialysis Comments Tx complete, all blood reinfused per protocol

## 2022-12-06 NOTE — ASSESSMENT & PLAN NOTE
Etiology is unclear - suspecting some aspect of gastritis and gastroparesis/dysmotility also likely with a psychosomatic component  There is some concern of pain med seeking   Recent CT abdomen showed no abnormality that would explain her pain  Abd XR flat/erect without acute findings  LA, CRP, and lipase WNL  One time dose GI cocktail with viscous lidocaine - minimal response  Not given reglan due to contraindication with recent seizure  - Continue analgesics - dilaudid changed to tramadol to minimalize opiate dysmotility  - Continue PPI  - Continue bentyl QID  - GI consult - EGD with signs of mild gastritis/edema, keep PPI, discontinue dilaudid, symptoms attributed to dysmotility from chronic conditions, advance diet as tolerated, do not do gastric emptying study due to recent opiate use, signed off  - Advanced to diabetic/renal diet and will reasses

## 2022-12-06 NOTE — CONSULTS
Nephrology Consult Note        Patient Name: Tabby Howard  MRN: [id number]    Patient Class: IP- Inpatient   Admission Date: 11/30/2022  Length of Stay: 2 days  Date of Service: 12/6/2022    Attending Physician: Max Garcia MD  Primary Care Provider: [facility]    Reason for Consult: esrd/anemia/htn/shpt/abdominal pain    SUBJECTIVE:     HPI: 32F with ESRD on HD, DM, HFpEF, GERD, suspected gastroparesis, sickle cell trait, HTN, presented to the ED for evaluation of abdominal pain with nausea,vomiting and diarrhea. She has had multiple ED visits and admissions for intractable nausea and vomiting as well as for gastroparesis. Her last HD session performed Tuesday. She has chest discomfort and abdominal bloating since then.  NOTE:  this is her 14th admission in the last 6 months.       ED work up showed chronic anemia and thrombocytopenia, elevated glucose of 587 with no evidence of acidosis.  She was given a small fluid bolus in the ED, as well as one dose of morphine and several antiemetics. She reported relief after receiving droperidol. EKG was reviewed with no sign of acute ischemia or infarction. She was given IV insulin as well. Dialysis and blood transfusion was emergently performed.    12/2 VSS. HD tomorrow per schedule. Can be dc if stable.  12/3 HD today, had CLD- abd pain after- getting pain meds  12/4 no issues with HD yest- still with abd pain- no n/v  12/5  AFVSS.   Tolerates hemodialysis.    12/6  Patient seen on hemodialysis for uremic clearance and ultrafiltration.  Asking for ice cream, Popsicles, and regular pudding.  Probably explains the bs of 423 this am        Past Medical History:   Diagnosis Date    CKD (chronic kidney disease), stage IV 4/12/2022    Diabetes mellitus due to underlying condition with unspecified complications 4/12/2022    Gastroparesis 4/12/2022    Heart failure with preserved ejection fraction 4/12/2022    EF 55% on 3/22    History of  gastroesophageal reflux (GERD)     History of supraventricular tachycardia     Hyperkalemia 2022    Hypertensive emergency 2022    Sickle cell trait 2022    Type 2 diabetes mellitus      Past Surgical History:   Procedure Laterality Date     SECTION      x 3    COLONOSCOPY      COLONOSCOPY N/A 2022    Procedure: COLONOSCOPY;  Surgeon: Jagdeep Cedeno MD;  Location: [facility];  Service: Endoscopy;  Laterality: N/A;    ESOPHAGOGASTRODUODENOSCOPY N/A 10/18/2019    Procedure: ESOPHAGOGASTRODUODENOSCOPY (EGD);  Surgeon: Gianluca Mendez MD;  Location: [facility];  Service: Endoscopy;  Laterality: N/A;    ESOPHAGOGASTRODUODENOSCOPY N/A 2022    Procedure: EGD (ESOPHAGOGASTRODUODENOSCOPY);  Surgeon: Micky Paredes III, MD;  Location: [facility];  Service: Endoscopy;  Laterality: N/A;    ESOPHAGOGASTRODUODENOSCOPY N/A 2022    Procedure: EGD (ESOPHAGOGASTRODUODENOSCOPY);  Surgeon: Marcelo Zhong MD;  Location: [facility];  Service: Endoscopy;  Laterality: N/A;    LAPAROSCOPIC CHOLECYSTECTOMY N/A 2022    Procedure: CHOLECYSTECTOMY, LAPAROSCOPIC;  Surgeon: Grey Perez MD;  Location: [facility];  Service: General;  Laterality: N/A;    PLACEMENT OF DUAL-LUMEN VASCULAR CATHETER Left 2022    Procedure: INSERTION-CATHETER-JOSEPH;  Surgeon: Dionte Gan MD;  Location: [facility] OR;  Service: General;  Laterality: Left;    PLACEMENT OF DUAL-LUMEN VASCULAR CATHETER Right 2022    Procedure: INSERTION-CATHETER-Hemosplit;  Surgeon: Dionte Gan MD;  Location: [facility] OR;  Service: General;  Laterality: Right;     Family History   Problem Relation Age of Onset    Diabetes Mother     Diabetes Father      Social History     Tobacco Use    Smoking status: Never    Smokeless tobacco: Never   Substance Use Topics    Alcohol use: Not Currently    Drug use: No       Review of patient's allergies indicates:   Allergen Reactions    Penicillins Hives       Outpatient meds:  No current  "facility-administered medications on file prior to encounter.     Current Outpatient Medications on File Prior to Encounter   Medication Sig Dispense Refill    amLODIPine (NORVASC) 10 MG tablet Take 1 tablet (10 mg total) by mouth once daily. 30 tablet 11    apixaban (ELIQUIS) 5 mg Tab Take 1 tablet (5 mg total) by mouth 2 (two) times daily. 60 tablet 2    carvediloL (COREG) 25 MG tablet Take 1 tablet (25 mg total) by mouth 2 (two) times daily. 60 tablet 2    cloNIDine 0.2 mg/24 hr td ptwk (CATAPRES) 0.2 mg/24 hr Place 1 patch onto the skin every 7 days. 4 patch 2    FLUoxetine 20 MG capsule Take 1 capsule (20 mg total) by mouth once daily. 30 capsule 11    gabapentin (NEURONTIN) 100 MG capsule Take 1 capsule (100 mg total) by mouth 2 (two) times daily. 90 capsule 2    insulin aspart U-100 (NOVOLOG) 100 unit/mL (3 mL) InPn pen Inject 1-10 Units into the skin before meals and at bedtime as needed (Hyperglycemia). Max daily dose 40 units. 3 mL 6    insulin detemir U-100 (LEVEMIR FLEXTOUCH) 100 unit/mL (3 mL) SubQ InPn pen Inject 5 Units into the skin once daily. Discard pen after 42 days. 6 mL 2    insulin glargine (LANTUS) 100 unit/mL injection Inject 5 Units into the skin every morning.      isosorbide mononitrate (IMDUR) 60 MG 24 hr tablet Take 2 tablets (120 mg total) by mouth once daily. 30 tablet 11    levETIRAcetam (KEPPRA) 500 MG Tab Take 1 tablet (500 mg total) by mouth 2 (two) times daily. 60 tablet 11    methocarbamoL (ROBAXIN) 500 MG Tab Take 1 tablet (500 mg total) by mouth 3 (three) times daily. 90 tablet 1    ondansetron (ZOFRAN) 4 MG tablet Take 1 tablet (4 mg total) by mouth every 8 (eight) hours as needed for Nausea. 60 tablet 2    pantoprazole (PROTONIX) 40 MG tablet Take 40 mg by mouth once daily.      pen needle, diabetic (BD ULTRA-FINE MAGALIE PEN NEEDLE) 32 gauge x 5/32" Ndle Inject into the skin 5 times per day with insulin 100 each 12    albuterol (PROVENTIL/VENTOLIN HFA) 90 mcg/actuation inhaler " Inhale 2 puffs into the lungs every 6 (six) hours as needed for Wheezing. Rescue 18 g 3    hydrALAZINE (APRESOLINE) 100 MG tablet Take 1 tablet (100 mg total) by mouth every 8 (eight) hours. 90 tablet 2    [DISCONTINUED] atenoloL (TENORMIN) 50 MG tablet Take 1 tablet (50 mg total) by mouth every other day. 45 tablet 3    [DISCONTINUED] omeprazole (PRILOSEC) 20 MG capsule Take 2 capsules (40 mg total) by mouth once daily. for 10 days 20 capsule 0    [DISCONTINUED] torsemide (DEMADEX) 20 MG Tab Take 1 tablet (20 mg total) by mouth 2 (two) times a day. (Patient taking differently: Take 20 mg by mouth once daily.) 60 tablet 1       Scheduled meds:   amLODIPine  10 mg Oral Daily    apixaban  2.5 mg Oral BID    carvediloL  25 mg Oral BID    dicyclomine  10 mg Oral QID    epoetin teresa-epbx  10,000 Units Subcutaneous Every Tues, Thurs, Sat    FLUoxetine  20 mg Oral Daily    gabapentin  100 mg Oral BID    insulin detemir U-100  8 Units Subcutaneous Daily    isosorbide mononitrate  120 mg Oral Daily    levETIRAcetam  500 mg Oral BID    methocarbamoL  500 mg Oral TID    mirtazapine  15 mg Oral QHS    pantoprazole  40 mg Oral Daily    senna-docusate 8.6-50 mg  1 tablet Oral BID       Infusions:      PRN meds:  sodium chloride, sodium chloride, acetaminophen, aluminum-magnesium hydroxide-simethicone, dextrose 10%, dextrose 10%, glucagon (human recombinant), glucose, glucose, insulin aspart U-100, labetaloL, magnesium oxide, magnesium oxide, melatonin, naloxone, ondansetron, potassium bicarbonate, potassium bicarbonate, potassium bicarbonate, potassium, sodium phosphates, potassium, sodium phosphates, potassium, sodium phosphates, prochlorperazine, simethicone, sodium chloride 0.9%, sodium chloride 0.9%, traMADoL    Review of Systems:  Review of Systems   Constitutional:  Positive for malaise/fatigue. Negative for chills, fever and weight loss.   HENT:  Negative for hearing loss and nosebleeds.    Eyes:  Negative for blurred  vision, double vision and photophobia.   Respiratory:  Negative for cough, shortness of breath and wheezing.    Cardiovascular:  Positive for chest pain. Negative for palpitations and leg swelling.   Gastrointestinal:  Positive for abdominal pain and nausea. Negative for constipation, diarrhea, heartburn and vomiting.   Genitourinary:  Negative for dysuria, frequency and urgency.   Musculoskeletal:  Negative for falls, joint pain and myalgias.   Skin:  Negative for itching and rash.   Neurological:  Positive for weakness. Negative for dizziness, speech change, focal weakness, loss of consciousness and headaches.   Endo/Heme/Allergies:  Does not bruise/bleed easily.   Psychiatric/Behavioral:  Negative for depression and substance abuse. The patient is not nervous/anxious.      OBJECTIVE:     Vital Signs and IO:  Temp:  [97 °F (36.1 °C)-98.5 °F (36.9 °C)]   Pulse:  [77-88]   Resp:  [16-19]   BP: (120-207)/()   SpO2:  [86 %-97 %]   I/O last 3 completed shifts:  In: 360 [P.O.:360]  Out: -   Wt Readings from Last 5 Encounters:   12/02/22 59 kg (129 lb 15.7 oz)   11/19/22 56 kg (123 lb 7.3 oz)   11/20/22 56 kg (123 lb 7.3 oz)   11/12/22 57.1 kg (125 lb 14.1 oz)   11/06/22 59.9 kg (132 lb)     Body mass index is 23.77 kg/m².    Physical Exam  Constitutional:       General: She is not in acute distress.     Appearance: She is well-developed. She is not diaphoretic.   HENT:      Head: Normocephalic and atraumatic.      Mouth/Throat:      Mouth: Mucous membranes are moist.   Eyes:      General: No scleral icterus.     Pupils: Pupils are equal, round, and reactive to light.   Cardiovascular:      Rate and Rhythm: Normal rate and regular rhythm.   Pulmonary:      Effort: Pulmonary effort is normal. No respiratory distress.      Breath sounds: No stridor.   Abdominal:      General: There is no distension.      Palpations: Abdomen is soft.   Musculoskeletal:         General: No deformity. Normal range of motion.       Cervical back: Neck supple.   Skin:     General: Skin is warm and dry.      Findings: No erythema or rash.   Neurological:      Mental Status: She is alert and oriented to person, place, and time.      Cranial Nerves: No cranial nerve deficit.   Psychiatric:         Behavior: Behavior normal.       Laboratory:  Recent Labs   Lab 12/04/22  0843 12/05/22  0813 12/06/22  0547    136 134*   K 3.8 4.5 5.3*    103 101   CO2 25 25 22*   BUN 15 23* 32*   CREATININE 3.3* 4.5* 5.5*   * 139* 423*         Recent Labs   Lab 12/01/22  0542 12/02/22  0537 12/03/22  0456 12/04/22  0843 12/05/22  0813 12/06/22  0547   CALCIUM 8.8 8.7 8.6* 8.8 8.9 8.3*   ALBUMIN 2.8* 2.9* 2.8* 2.9* 2.7* 2.8*   PHOS 5.2* 3.6 3.7  --   --   --    MG 1.8 1.9 2.1 1.9  --   --          Recent Labs   Lab 07/08/22  0516   PTH, Intact 289.8 H         Recent Labs   Lab 12/04/22  1631 12/04/22  2023 12/05/22  0748 12/05/22  1139 12/05/22  1712 12/05/22  2029 12/06/22  0821 12/06/22  1002 12/06/22  1055 12/06/22  1156   POCTGLUCOSE 58* 113* 143* 166* 96 120* 423* 297* 192* 161*         Recent Labs   Lab 05/15/22  1954 07/22/22  1653 08/24/22  0218   Hemoglobin A1C 5.8 H 6.0 H 6.2         Recent Labs   Lab 12/04/22  0843 12/05/22  0813 12/06/22  0547   WBC 5.18 4.67 4.61   HGB 9.0* 7.9* 7.7*   HCT 26.3* 24.4* 23.1*   PLT 87* 70* 72*   MCV 82 86 85   MCHC 34.2 32.4 33.3   MONO 8.5  0.4 7.7  0.4 9.5  0.4         Recent Labs   Lab 12/04/22  0843 12/05/22  0813 12/06/22  0547   BILITOT 1.4* 1.7* 1.4*   PROT 6.5 5.9* 6.0   ALBUMIN 2.9* 2.7* 2.8*   ALKPHOS 347* 309* 307*   ALT 65* 47* 44   AST 36 27 27         Recent Labs   Lab 10/16/22  1736 10/24/22  0107 11/19/22  1210   Color, UA Yellow Yellow Yellow   Appearance, UA Hazy A Clear Clear   pH, UA 7.0 8.0 8.0   Specific [place], UA 1.020 1.020 1.025   Protein, UA 4+ 4+ 4+   Glucose, UA 4+ A 4+ A 4+ A   Ketones, UA Trace A Negative Negative   Urobilinogen, UA Negative Negative Negative    Bilirubin (UA) Negative Negative 1+ A   Occult Blood UA 2+ A 2+ A 2+ A   Nitrite, UA Negative Negative Negative   RBC, UA 12 H 41 H 38 H   WBC, UA >100 H 24 H 25 H   Bacteria Negative Negative Negative   Hyaline Casts, UA 7 A 6 A 2 A         Recent Labs   Lab 10/04/22  0901 10/16/22  1643 11/30/22  1942   POC PH 7.378 7.398 7.434   POC PCO2 54.4 H 37.6 39.8   POC HCO3 32.0 H 23.2 L 26.6   POC PO2 26 L 55 52   POC SATURATED O2 44 L 88 L 87 L   POC BE 7 -2 2   Sample VENOUS VENOUS VENOUS         Microbiology Results (last 7 days)       ** No results found for the last 168 hours. **            ASSESSMENT/PLAN:     ESRD on HD TTS via AVF  Clearance parameters are at goal    Anemia of CKD  Transfused 2 PRBC on 12/1  Hb lower today. Giving 2 units of pRBC today with hd.  Goal hg in ESRD patients is 10-12    Continue SHELLEY with HD    MBD / Secondary HPT  No acute binder needs    HTN  Controlled  UF with HD    Abdominal pain  GI consulted  EGD and Colonoscopy previously unremarkable  Agree with being judicious with opiate based analgesia.  This is her 14th admission in the last 6 months            Thank you for allowing us to participate in the care of your patient!   We will follow the patient and provide recommendations as needed.    Patient care time was spent personally by me on the following activities: > 35 min    Obtaining a history.  Examination of patient.  Providing medical care at the patients bedside.  Developing a treatment plan with patient or surrogate and bedside caregivers.  Ordering and reviewing laboratory studies, radiographic studies, pulse oximetry.  Ordering and performing treatments and interventions.  Evaluation of patient's response to treatment.  Discussions with consultants while on the unit and immediately available to the patient.  Re-evaluation of the patient's condition.  Documentation in the medical record.     Hugh Figueroa MD    [facility]  [address] [place] [address]  [place]  [address]    [phone] - tel  [phone] - fax    12/6/2022

## 2022-12-06 NOTE — PLAN OF CARE
Plan of care reviewed with patient. Patient verbalized complete understanding. Pt awake, alert, and oriented.Pt complaining of pain, meds given, pt continues to ask for more pain medicine. Pt not complaining of any nausea. Bowel sounds active. Pt IV CDI.   All fall precautions maintained, bed in lowest position, locked, call light within reach. Side rails up times 2. Slip resistant socks maintained.

## 2022-12-06 NOTE — PROGRESS NOTES
[facility] Hospital Medicine  Progress Note    Patient Name: Tabby Howard  MRN: [id number]  Patient Class: IP- Inpatient   Admission Date: 11/30/2022  Length of Stay: 2 days  Attending Physician: Max Garcia MD  Primary Care Provider: [facility]        Subjective:     Principal Problem:Intractable generalized abdominal pain        HPI:  Tabby Howard is a 32 year old female with ESRD on HD, DM, HFpEF, GERD, suspected gastroparesis, sickle cell trait, HTN, who presented to the ED for evaluation of abdominal pain with nausea,vomiting and diarrhea. She has had multiple ED visits and admissions for the same complaint in the past, for intractable nausea and vomiting as well as for gastroparesis. She is a dialysis patient, with her last session performed Tuesday. She states she has had chest discomfort and abdominal bloating since then. She denies urinary complaint, blood in vomit or stool, fever, chills or other complaint.    ED work up included a CBC which showed chronic anemia and thrombocytopenia. CMP showed an elevated glucose of 587 with no evidence of acidosis. Liver enzymes are elevated from baseline today.  She was given a small fluid bolus in the ED, as well as one dose of morphine and several antiemetics. She reported relief after receiving droperidol. EKG was reviewed with no sign of acute ischemia or infarction. She was given IV insulin as well. Hospital Medicine consulted for admission and further management.      Overview/Hospital Course:  Pt was admitted with unrelenting abdominal pain and nausea with intermittent vomiting.  Was given analgesics and anti-emetics.  The nausea became less intense and vomiting resolved.  However, the pain remained significant.  Symptomatic treatment was continued.  Suspected her symptoms were related to gastroparesis and likely gastritis as well. She was kept on PPI.  Reglan contraindicated as she has had a somewhat  recent seizure and is an anti-epileptic. We made sure her bowels kept moving on bowel regimen. Abd XR flat/erect without acute findings. Lipase WNL. Lactic acid and CRP WNL. Bentyl scheduled. GI cocktail with viscous lidocaine ordered. Took a clear liquid diet but did report some mildly increased pain. GI consulted and performed EGD which showed patchy areas of inflammation likely edema and gastritis. EGD biopsies taken. GI recommended discontinuation of her dilaudid as her symptoms were attributed to GI tract dysmotility related to her chronic conditions. Acetaminophen and tramadol were used in place of dilaudid to minimalize the effects of opiates on motility per GI recommendation. She continued with similar pain and tolerated a full liquid diet. Underwent routine dialysis on her scheduled days. Required few red blood cell transfusions with dialysis per nephrology for low H/H. No other signs of bleeding. Tele-psych consult done and mirtazapine added per recommendation of possible adjustment disorder. Diabetic/renal diet ordered.      Interval History: Patient seen and examined. NAEON. Getting dialysis. To get 2u pRBCs for low H/H. Tele-psych eval'd patient and recommended addition of nightly mirtazapine. Her pain remains similar although she is hungry asking for cereal.    Review of Systems   Constitutional:  Negative for chills and fever.   Respiratory:  Negative for cough and shortness of breath.    Cardiovascular:  Negative for chest pain and leg swelling.   Gastrointestinal:  Positive for abdominal pain. Negative for constipation, diarrhea, nausea and vomiting.   Musculoskeletal:  Positive for back pain.   Objective:     Vital Signs (Most Recent):  Temp: 97.6 °F (36.4 °C) (12/06/22 1230)  Pulse: 86 (12/06/22 1230)  Resp: 18 (12/06/22 0850)  BP: (!) 207/111 (12/06/22 1230)  SpO2: (!) 94 % (12/06/22 0729)   Vital Signs (24h Range):  Temp:  [97 °F (36.1 °C)-98.5 °F (36.9 °C)] 97.6 °F (36.4 °C)  Pulse:  [77-88]  86  Resp:  [16-19] 18  SpO2:  [86 %-97 %] 94 %  BP: (120-207)/() 207/111     Weight: 59 kg (129 lb 15.7 oz)  Body mass index is 23.77 kg/m².    Intake/Output Summary (Last 24 hours) at 12/6/2022 1254  Last data filed at 12/6/2022 1150  Gross per 24 hour   Intake 468 ml   Output --   Net 468 ml      Physical Exam  Constitutional:       General: She is not in acute distress.     Appearance: She is not ill-appearing.   HENT:      Head: Normocephalic and atraumatic.   Neck:      Vascular: No JVD.   Cardiovascular:      Rate and Rhythm: Normal rate and regular rhythm.   Pulmonary:      Effort: Pulmonary effort is normal.      Breath sounds: Normal breath sounds.   Abdominal:      General: Abdomen is flat. Bowel sounds are normal. There is no distension.      Palpations: Abdomen is soft.      Tenderness: There is generalized abdominal tenderness (only mildly exacerbating her pain, worse in epigastrium). There is no guarding or rebound.   Musculoskeletal:      Right lower leg: No edema.      Left lower leg: No edema.   Skin:     General: Skin is warm and moist.      Findings: No rash.   Neurological:      Mental Status: She is alert and oriented to person, place, and time.   Psychiatric:         Attention and Perception: Attention normal.         Mood and Affect: Affect normal.         Speech: Speech normal.       Significant Labs: All pertinent labs within the past 24 hours have been reviewed.    Significant Imaging: I have reviewed all pertinent imaging results/findings within the past 24 hours.      Assessment/Plan:      * Intractable generalized abdominal pain  Etiology is unclear - suspecting some aspect of gastritis and gastroparesis/dysmotility also likely with a psychosomatic component  There is some concern of pain med seeking   Recent CT abdomen showed no abnormality that would explain her pain  Abd XR flat/erect without acute findings  LA, CRP, and lipase WNL  One time dose GI cocktail with viscous  lidocaine - minimal response  Not given reglan due to contraindication with recent seizure  - Continue analgesics - dilaudid changed to tramadol to minimalize opiate dysmotility  - Continue PPI  - Continue bentyl QID  - GI consult - EGD with signs of mild gastritis/edema, keep PPI, discontinue dilaudid, symptoms attributed to dysmotility from chronic conditions, advance diet as tolerated, do not do gastric emptying study due to recent opiate use, signed off  - Advanced to diabetic/renal diet and will reasses    Intractable nausea and vomiting  Improved  - Continue antiemetics prn  - diet as above    Anemia due to chronic kidney disease, on chronic dialysis  Patient's anemia is currently uncontrolled. She received two units of PRBC on day of admission. Another two units ordered 12/6. Etiology likely d/t chronic kidney disease  Current CBC reviewed-   Lab Results   Component Value Date    HGB 7.7 (L) 12/06/2022    HCT 23.1 (L) 12/06/2022     Monitor serial CBC and transfuse if patient becomes hemodynamically unstable, symptomatic or H/H drops below 7/21.     Malnutrition of moderate degree  Nutrition consulted. Most recent weight and BMI monitored    Hypertension  Chronic, controlled.  Latest blood pressure and vitals reviewed- some elevated readings during dialysis  Temp:  [97 °F (36.1 °C)-98.5 °F (36.9 °C)]   Pulse:  [77-88]   Resp:  [16-19]   BP: (120-207)/()   SpO2:  [86 %-97 %] .   Home meds for hypertension were reviewed and noted below.   Hypertension Medications             amLODIPine (NORVASC) 10 MG tablet Take 1 tablet (10 mg total) by mouth once daily.    carvediloL (COREG) 25 MG tablet Take 1 tablet (25 mg total) by mouth 2 (two) times daily.    cloNIDine 0.2 mg/24 hr td ptwk (CATAPRES) 0.2 mg/24 hr Place 1 patch onto the skin every 7 days.    hydrALAZINE (APRESOLINE) 100 MG tablet Take 1 tablet (100 mg total) by mouth every 8 (eight) hours.    isosorbide mononitrate (IMDUR) 60 MG 24 hr tablet Take 2  tablets (120 mg total) by mouth once daily.      While in the hospital, will manage blood pressure as follows; Continue home antihypertensive regimen except for hydralazine and clonidine transdermal.    Will utilize p.r.n. blood pressure medication only if patient's blood pressure greater than  180/110 and she develops symptoms such as worsening chest pain or shortness of breath.    Deep vein thrombosis (DVT) of non-extremity vein  Noted, on eliquis    ESRD (end stage renal disease) on dialysis  Nephro following and dialyzing on schedule    Seizure  Noted, chronic  Continue keppra  Seizure precautions    Type 2 diabetes mellitus with chronic kidney disease on chronic dialysis, with long-term current use of insulin  Patient's FSGs are controlled on current medication regimen.  Last A1c reviewed-   Lab Results   Component Value Date    HGBA1C 6.2 08/24/2022     Most recent fingerstick glucose reviewed-   Recent Labs   Lab 12/06/22  0821 12/06/22  1002 12/06/22  1055 12/06/22  1156   POCTGLUCOSE 423* 297* 192* 161*     Current correctional scale  Medium  Maintain regimen:  Antihyperglycemics (From admission, onward)    Start     Stop Route Frequency Ordered    12/04/22 0900  insulin detemir U-100 pen 8 Units         -- SubQ Daily 12/03/22 2119 11/30/22 2240  insulin aspart U-100 pen 1-10 Units         -- SubQ Before meals & nightly PRN 11/30/22 2145        Hold Oral hypoglycemics while patient is in the hospital.  Adjust regimen for goal glucose <180    Gastritis  Noted, chronic; may be exacerbated  Continue protonix      VTE Risk Mitigation (From admission, onward)         Ordered     apixaban tablet 2.5 mg  2 times daily         12/02/22 0915     Reason for No Pharmacological VTE Prophylaxis  Once        Question:  Reasons:  Answer:  Already adequately anticoagulated on oral Anticoagulants    11/30/22 2145     IP VTE HIGH RISK PATIENT  Once         11/30/22 2145     Place sequential compression device  Until  discontinued         11/30/22 2145                Discharge Planning   TATIANA: 12/7-12/8     Code Status: Full Code   Is the patient medically ready for discharge?:     Reason for patient still in hospital (select all that apply): Patient trending condition and Treatment - getting pRBC. Advancing diet  Discharge Plan A: Home with family        Max Garcia MD  Department of Hospital Medicine   [facility]

## 2022-12-06 NOTE — SUBJECTIVE & OBJECTIVE
Interval History: Patient seen and examined. RUBENSEON. Getting dialysis. To get 2u pRBCs for low H/H. Tele-psych eval'd patient and recommended addition of nightly mirtazapine. Her pain remains similar although she is hungry asking for cereal.    Review of Systems   Constitutional:  Negative for chills and fever.   Respiratory:  Negative for cough and shortness of breath.    Cardiovascular:  Negative for chest pain and leg swelling.   Gastrointestinal:  Positive for abdominal pain. Negative for constipation, diarrhea, nausea and vomiting.   Musculoskeletal:  Positive for back pain.   Objective:     Vital Signs (Most Recent):  Temp: 97.6 °F (36.4 °C) (12/06/22 1230)  Pulse: 86 (12/06/22 1230)  Resp: 18 (12/06/22 0850)  BP: (!) 207/111 (12/06/22 1230)  SpO2: (!) 94 % (12/06/22 0729)   Vital Signs (24h Range):  Temp:  [97 °F (36.1 °C)-98.5 °F (36.9 °C)] 97.6 °F (36.4 °C)  Pulse:  [77-88] 86  Resp:  [16-19] 18  SpO2:  [86 %-97 %] 94 %  BP: (120-207)/() 207/111     Weight: 59 kg (129 lb 15.7 oz)  Body mass index is 23.77 kg/m².    Intake/Output Summary (Last 24 hours) at 12/6/2022 1254  Last data filed at 12/6/2022 1150  Gross per 24 hour   Intake 468 ml   Output --   Net 468 ml      Physical Exam  Constitutional:       General: She is not in acute distress.     Appearance: She is not ill-appearing.   HENT:      Head: Normocephalic and atraumatic.   Neck:      Vascular: No JVD.   Cardiovascular:      Rate and Rhythm: Normal rate and regular rhythm.   Pulmonary:      Effort: Pulmonary effort is normal.      Breath sounds: Normal breath sounds.   Abdominal:      General: Abdomen is flat. Bowel sounds are normal. There is no distension.      Palpations: Abdomen is soft.      Tenderness: There is generalized abdominal tenderness (only mildly exacerbating her pain, worse in epigastrium). There is no guarding or rebound.   Musculoskeletal:      Right lower leg: No edema.      Left lower leg: No edema.   Skin:     General:  Skin is warm and moist.      Findings: No rash.   Neurological:      Mental Status: She is alert and oriented to person, place, and time.   Psychiatric:         Attention and Perception: Attention normal.         Mood and Affect: Affect normal.         Speech: Speech normal.       Significant Labs: All pertinent labs within the past 24 hours have been reviewed.    Significant Imaging: I have reviewed all pertinent imaging results/findings within the past 24 hours.

## 2022-12-07 VITALS
OXYGEN SATURATION: 93 % | DIASTOLIC BLOOD PRESSURE: 64 MMHG | WEIGHT: 130 LBS | TEMPERATURE: 99 F | BODY MASS INDEX: 23.92 KG/M2 | HEIGHT: 62 IN | RESPIRATION RATE: 17 BRPM | SYSTOLIC BLOOD PRESSURE: 111 MMHG | HEART RATE: 80 BPM

## 2022-12-07 PROBLEM — F43.20 ADJUSTMENT DISORDER: Status: ACTIVE | Noted: 2022-12-07

## 2022-12-07 LAB
ALBUMIN SERPL BCP-MCNC: 2.8 G/DL (ref 3.5–5.2)
ALP SERPL-CCNC: 319 U/L (ref 55–135)
ALT SERPL W/O P-5'-P-CCNC: 41 U/L (ref 10–44)
ANION GAP SERPL CALC-SCNC: 9 MMOL/L (ref 8–16)
AST SERPL-CCNC: 23 U/L (ref 10–40)
BASOPHILS # BLD AUTO: 0.04 K/UL (ref 0–0.2)
BASOPHILS NFR BLD: 0.6 % (ref 0–1.9)
BILIRUB SERPL-MCNC: 1.9 MG/DL (ref 0.1–1)
BUN SERPL-MCNC: 20 MG/DL (ref 6–20)
CALCIUM SERPL-MCNC: 8.9 MG/DL (ref 8.7–10.5)
CHLORIDE SERPL-SCNC: 102 MMOL/L (ref 95–110)
CO2 SERPL-SCNC: 25 MMOL/L (ref 23–29)
CREAT SERPL-MCNC: 3.5 MG/DL (ref 0.5–1.4)
DIFFERENTIAL METHOD: ABNORMAL
EOSINOPHIL # BLD AUTO: 0.2 K/UL (ref 0–0.5)
EOSINOPHIL NFR BLD: 3.3 % (ref 0–8)
ERYTHROCYTE [DISTWIDTH] IN BLOOD BY AUTOMATED COUNT: 16.5 % (ref 11.5–14.5)
EST. GFR  (NO RACE VARIABLE): 17 ML/MIN/1.73 M^2
GLUCOSE SERPL-MCNC: 230 MG/DL (ref 70–110)
HCT VFR BLD AUTO: 31.6 % (ref 37–48.5)
HGB BLD-MCNC: 10.7 G/DL (ref 12–16)
IMM GRANULOCYTES # BLD AUTO: 0.03 K/UL (ref 0–0.04)
IMM GRANULOCYTES NFR BLD AUTO: 0.4 % (ref 0–0.5)
LYMPHOCYTES # BLD AUTO: 1 K/UL (ref 1–4.8)
LYMPHOCYTES NFR BLD: 14.7 % (ref 18–48)
MCH RBC QN AUTO: 27.9 PG (ref 27–31)
MCHC RBC AUTO-ENTMCNC: 33.9 G/DL (ref 32–36)
MCV RBC AUTO: 82 FL (ref 82–98)
MONOCYTES # BLD AUTO: 0.8 K/UL (ref 0.3–1)
MONOCYTES NFR BLD: 11.2 % (ref 4–15)
NEUTROPHILS # BLD AUTO: 4.9 K/UL (ref 1.8–7.7)
NEUTROPHILS NFR BLD: 69.8 % (ref 38–73)
NRBC BLD-RTO: 0 /100 WBC
PLATELET # BLD AUTO: 90 K/UL (ref 150–450)
PMV BLD AUTO: 8.8 FL (ref 9.2–12.9)
POCT GLUCOSE: 247 MG/DL (ref 70–110)
POCT GLUCOSE: 254 MG/DL (ref 70–110)
POTASSIUM SERPL-SCNC: 4 MMOL/L (ref 3.5–5.1)
PROT SERPL-MCNC: 6.5 G/DL (ref 6–8.4)
RBC # BLD AUTO: 3.84 M/UL (ref 4–5.4)
SODIUM SERPL-SCNC: 136 MMOL/L (ref 136–145)
WBC # BLD AUTO: 6.95 K/UL (ref 3.9–12.7)

## 2022-12-07 PROCEDURE — 25000003 PHARM REV CODE 250: Performed by: HOSPITALIST

## 2022-12-07 PROCEDURE — 25000003 PHARM REV CODE 250: Performed by: NURSE PRACTITIONER

## 2022-12-07 PROCEDURE — 85025 COMPLETE CBC W/AUTO DIFF WBC: CPT | Performed by: STUDENT IN AN ORGANIZED HEALTH CARE EDUCATION/TRAINING PROGRAM

## 2022-12-07 PROCEDURE — 80053 COMPREHEN METABOLIC PANEL: CPT | Performed by: STUDENT IN AN ORGANIZED HEALTH CARE EDUCATION/TRAINING PROGRAM

## 2022-12-07 PROCEDURE — 25000003 PHARM REV CODE 250: Performed by: STUDENT IN AN ORGANIZED HEALTH CARE EDUCATION/TRAINING PROGRAM

## 2022-12-07 PROCEDURE — 36415 COLL VENOUS BLD VENIPUNCTURE: CPT | Performed by: STUDENT IN AN ORGANIZED HEALTH CARE EDUCATION/TRAINING PROGRAM

## 2022-12-07 PROCEDURE — 94761 N-INVAS EAR/PLS OXIMETRY MLT: CPT

## 2022-12-07 PROCEDURE — 94760 N-INVAS EAR/PLS OXIMETRY 1: CPT

## 2022-12-07 RX ORDER — HYDRALAZINE HYDROCHLORIDE 25 MG/1
100 TABLET, FILM COATED ORAL EVERY 8 HOURS
Status: DISCONTINUED | OUTPATIENT
Start: 2022-12-07 | End: 2022-12-07 | Stop reason: HOSPADM

## 2022-12-07 RX ORDER — DICYCLOMINE HYDROCHLORIDE 10 MG/1
10 CAPSULE ORAL EVERY 8 HOURS PRN
Qty: 30 CAPSULE | Refills: 0 | Status: ON HOLD | OUTPATIENT
Start: 2022-12-07 | End: 2022-12-14 | Stop reason: SDUPTHER

## 2022-12-07 RX ORDER — TRAMADOL HYDROCHLORIDE 50 MG/1
50 TABLET ORAL EVERY 8 HOURS PRN
Qty: 30 TABLET | Refills: 0 | Status: ON HOLD | OUTPATIENT
Start: 2022-12-07 | End: 2022-12-14 | Stop reason: SDUPTHER

## 2022-12-07 RX ORDER — CLONIDINE HYDROCHLORIDE 0.1 MG/1
0.1 TABLET ORAL 3 TIMES DAILY PRN
Status: DISCONTINUED | OUTPATIENT
Start: 2022-12-07 | End: 2022-12-07 | Stop reason: HOSPADM

## 2022-12-07 RX ORDER — PROMETHAZINE HYDROCHLORIDE 12.5 MG/1
12.5 TABLET ORAL EVERY 12 HOURS PRN
Qty: 12 TABLET | Refills: 0 | Status: ON HOLD | OUTPATIENT
Start: 2022-12-07 | End: 2022-12-14 | Stop reason: SDUPTHER

## 2022-12-07 RX ORDER — MIRTAZAPINE 15 MG/1
15 TABLET, ORALLY DISINTEGRATING ORAL NIGHTLY
Qty: 90 TABLET | Refills: 0 | Status: ON HOLD | OUTPATIENT
Start: 2022-12-07 | End: 2023-04-14 | Stop reason: HOSPADM

## 2022-12-07 RX ADMIN — INSULIN ASPART 6 UNITS: 100 INJECTION, SOLUTION INTRAVENOUS; SUBCUTANEOUS at 11:12

## 2022-12-07 RX ADMIN — HYDRALAZINE HYDROCHLORIDE 100 MG: 25 TABLET, FILM COATED ORAL at 09:12

## 2022-12-07 RX ADMIN — PANTOPRAZOLE SODIUM 40 MG: 40 TABLET, DELAYED RELEASE ORAL at 09:12

## 2022-12-07 RX ADMIN — INSULIN ASPART 4 UNITS: 100 INJECTION, SOLUTION INTRAVENOUS; SUBCUTANEOUS at 09:12

## 2022-12-07 RX ADMIN — INSULIN DETEMIR 8 UNITS: 100 INJECTION, SOLUTION SUBCUTANEOUS at 09:12

## 2022-12-07 RX ADMIN — HYDRALAZINE HYDROCHLORIDE 100 MG: 25 TABLET, FILM COATED ORAL at 02:12

## 2022-12-07 RX ADMIN — GABAPENTIN 100 MG: 100 CAPSULE ORAL at 09:12

## 2022-12-07 RX ADMIN — AMLODIPINE BESYLATE 10 MG: 5 TABLET ORAL at 09:12

## 2022-12-07 RX ADMIN — DICYCLOMINE HYDROCHLORIDE 10 MG: 10 CAPSULE ORAL at 09:12

## 2022-12-07 RX ADMIN — APIXABAN 2.5 MG: 2.5 TABLET, FILM COATED ORAL at 09:12

## 2022-12-07 RX ADMIN — METHOCARBAMOL 500 MG: 500 TABLET ORAL at 09:12

## 2022-12-07 RX ADMIN — LEVETIRACETAM 500 MG: 250 TABLET, FILM COATED ORAL at 09:12

## 2022-12-07 RX ADMIN — FLUOXETINE 20 MG: 20 CAPSULE ORAL at 09:12

## 2022-12-07 RX ADMIN — DICYCLOMINE HYDROCHLORIDE 10 MG: 10 CAPSULE ORAL at 02:12

## 2022-12-07 RX ADMIN — METHOCARBAMOL 500 MG: 500 TABLET ORAL at 02:12

## 2022-12-07 RX ADMIN — CARVEDILOL 25 MG: 25 TABLET, FILM COATED ORAL at 09:12

## 2022-12-07 RX ADMIN — ISOSORBIDE MONONITRATE 120 MG: 60 TABLET, EXTENDED RELEASE ORAL at 09:12

## 2022-12-07 RX ADMIN — CLONIDINE HYDROCHLORIDE 0.1 MG: 0.1 TABLET ORAL at 06:12

## 2022-12-07 NOTE — CONSULTS
Nephrology Consult Note        Patient Name: Tabby Howard  MRN: [id number]    Patient Class: IP- Inpatient   Admission Date: 11/30/2022  Length of Stay: 3 days  Date of Service: 12/7/2022    Attending Physician: Max Garcia MD  Primary Care Provider: [facility]    Reason for Consult: esrd/anemia/htn/shpt/abdominal pain    SUBJECTIVE:     HPI: 32F with ESRD on HD, DM, HFpEF, GERD, suspected gastroparesis, sickle cell trait, HTN, presented to the ED for evaluation of abdominal pain with nausea,vomiting and diarrhea. She has had multiple ED visits and admissions for intractable nausea and vomiting as well as for gastroparesis. Her last HD session performed Tuesday. She has chest discomfort and abdominal bloating since then.  NOTE:  this is her 14th admission in the last 6 months.       ED work up showed chronic anemia and thrombocytopenia, elevated glucose of 587 with no evidence of acidosis.  She was given a small fluid bolus in the ED, as well as one dose of morphine and several antiemetics. She reported relief after receiving droperidol. EKG was reviewed with no sign of acute ischemia or infarction. She was given IV insulin as well. Dialysis and blood transfusion was emergently performed.    12/2 VSS. HD tomorrow per schedule. Can be dc if stable.  12/3 HD today, had CLD- abd pain after- getting pain meds  12/4 no issues with HD yest- still with abd pain- no n/v  12/5  AFVSS.   Tolerates hemodialysis.    12/6  Patient seen on hemodialysis for uremic clearance and ultrafiltration.  Asking for ice cream, Popsicles, and regular pudding.  Probably explains the bs of 423 this am  12/7  AFVSS.  Sleeping.  No acute events        Past Medical History:   Diagnosis Date    CKD (chronic kidney disease), stage IV 4/12/2022    Diabetes mellitus due to underlying condition with unspecified complications 4/12/2022    Gastroparesis 4/12/2022    Heart failure with preserved ejection fraction  2022    EF 55% on 3/22    History of gastroesophageal reflux (GERD)     History of supraventricular tachycardia     Hyperkalemia 2022    Hypertensive emergency 2022    Sickle cell trait 2022    Type 2 diabetes mellitus      Past Surgical History:   Procedure Laterality Date     SECTION      x 3    COLONOSCOPY      COLONOSCOPY N/A 2022    Procedure: COLONOSCOPY;  Surgeon: Jagdeep Cedeno MD;  Location: [facility];  Service: Endoscopy;  Laterality: N/A;    ESOPHAGOGASTRODUODENOSCOPY N/A 10/18/2019    Procedure: ESOPHAGOGASTRODUODENOSCOPY (EGD);  Surgeon: Gianluca Mendez MD;  Location: [facility];  Service: Endoscopy;  Laterality: N/A;    ESOPHAGOGASTRODUODENOSCOPY N/A 2022    Procedure: EGD (ESOPHAGOGASTRODUODENOSCOPY);  Surgeon: Micky Paredes III, MD;  Location: [facility];  Service: Endoscopy;  Laterality: N/A;    ESOPHAGOGASTRODUODENOSCOPY N/A 2022    Procedure: EGD (ESOPHAGOGASTRODUODENOSCOPY);  Surgeon: Marcelo Zhong MD;  Location: [facility];  Service: Endoscopy;  Laterality: N/A;    LAPAROSCOPIC CHOLECYSTECTOMY N/A 2022    Procedure: CHOLECYSTECTOMY, LAPAROSCOPIC;  Surgeon: Grey Perez MD;  Location: [facility];  Service: General;  Laterality: N/A;    PLACEMENT OF DUAL-LUMEN VASCULAR CATHETER Left 2022    Procedure: INSERTION-CATHETER-JOSEPH;  Surgeon: Dionte Gan MD;  Location: [facility];  Service: General;  Laterality: Left;    PLACEMENT OF DUAL-LUMEN VASCULAR CATHETER Right 2022    Procedure: INSERTION-CATHETER-Hemosplit;  Surgeon: Dionte Gan MD;  Location: [facility] OR;  Service: General;  Laterality: Right;     Family History   Problem Relation Age of Onset    Diabetes Mother     Diabetes Father      Social History     Tobacco Use    Smoking status: Never    Smokeless tobacco: Never   Substance Use Topics    Alcohol use: Not Currently    Drug use: No       Review of patient's allergies indicates:   Allergen Reactions    Penicillins  "Hives       Outpatient meds:  No current facility-administered medications on file prior to encounter.     Current Outpatient Medications on File Prior to Encounter   Medication Sig Dispense Refill    amLODIPine (NORVASC) 10 MG tablet Take 1 tablet (10 mg total) by mouth once daily. 30 tablet 11    apixaban (ELIQUIS) 5 mg Tab Take 1 tablet (5 mg total) by mouth 2 (two) times daily. 60 tablet 2    carvediloL (COREG) 25 MG tablet Take 1 tablet (25 mg total) by mouth 2 (two) times daily. 60 tablet 2    cloNIDine 0.2 mg/24 hr td ptwk (CATAPRES) 0.2 mg/24 hr Place 1 patch onto the skin every 7 days. 4 patch 2    FLUoxetine 20 MG capsule Take 1 capsule (20 mg total) by mouth once daily. 30 capsule 11    gabapentin (NEURONTIN) 100 MG capsule Take 1 capsule (100 mg total) by mouth 2 (two) times daily. 90 capsule 2    insulin aspart U-100 (NOVOLOG) 100 unit/mL (3 mL) InPn pen Inject 1-10 Units into the skin before meals and at bedtime as needed (Hyperglycemia). Max daily dose 40 units. 3 mL 6    isosorbide mononitrate (IMDUR) 60 MG 24 hr tablet Take 2 tablets (120 mg total) by mouth once daily. 30 tablet 11    levETIRAcetam (KEPPRA) 500 MG Tab Take 1 tablet (500 mg total) by mouth 2 (two) times daily. 60 tablet 11    methocarbamoL (ROBAXIN) 500 MG Tab Take 1 tablet (500 mg total) by mouth 3 (three) times daily. 90 tablet 1    ondansetron (ZOFRAN) 4 MG tablet Take 1 tablet (4 mg total) by mouth every 8 (eight) hours as needed for Nausea. 60 tablet 2    pantoprazole (PROTONIX) 40 MG tablet Take 40 mg by mouth once daily.      pen needle, diabetic (BD ULTRA-FINE MAGALIE PEN NEEDLE) 32 gauge x 5/32" Ndle Inject into the skin 5 times per day with insulin 100 each 12    [DISCONTINUED] insulin detemir U-100 (LEVEMIR FLEXTOUCH) 100 unit/mL (3 mL) SubQ InPn pen Inject 5 Units into the skin once daily. Discard pen after 42 days. 6 mL 2    [DISCONTINUED] insulin glargine (LANTUS) 100 unit/mL injection Inject 5 Units into the skin every " morning.      albuterol (PROVENTIL/VENTOLIN HFA) 90 mcg/actuation inhaler Inhale 2 puffs into the lungs every 6 (six) hours as needed for Wheezing. Rescue 18 g 3    hydrALAZINE (APRESOLINE) 100 MG tablet Take 1 tablet (100 mg total) by mouth every 8 (eight) hours. 90 tablet 2    [DISCONTINUED] atenoloL (TENORMIN) 50 MG tablet Take 1 tablet (50 mg total) by mouth every other day. 45 tablet 3    [DISCONTINUED] omeprazole (PRILOSEC) 20 MG capsule Take 2 capsules (40 mg total) by mouth once daily. for 10 days 20 capsule 0    [DISCONTINUED] torsemide (DEMADEX) 20 MG Tab Take 1 tablet (20 mg total) by mouth 2 (two) times a day. (Patient taking differently: Take 20 mg by mouth once daily.) 60 tablet 1       Scheduled meds:   amLODIPine  10 mg Oral Daily    apixaban  2.5 mg Oral BID    carvediloL  25 mg Oral BID    dicyclomine  10 mg Oral QID    epoetin teresa-epbx  10,000 Units Subcutaneous Every Tues, Thurs, Sat    FLUoxetine  20 mg Oral Daily    gabapentin  100 mg Oral BID    hydrALAZINE  100 mg Oral Q8H    insulin detemir U-100  8 Units Subcutaneous Daily    isosorbide mononitrate  120 mg Oral Daily    levETIRAcetam  500 mg Oral BID    methocarbamoL  500 mg Oral TID    mirtazapine  15 mg Oral QHS    pantoprazole  40 mg Oral Daily    senna-docusate 8.6-50 mg  1 tablet Oral BID       Infusions:      PRN meds:  sodium chloride, sodium chloride, acetaminophen, aluminum-magnesium hydroxide-simethicone, cloNIDine, dextrose 10%, dextrose 10%, glucagon (human recombinant), glucose, glucose, insulin aspart U-100, labetaloL, magnesium oxide, magnesium oxide, melatonin, naloxone, ondansetron, potassium bicarbonate, potassium bicarbonate, potassium bicarbonate, potassium, sodium phosphates, potassium, sodium phosphates, potassium, sodium phosphates, prochlorperazine, simethicone, sodium chloride 0.9%, sodium chloride 0.9%, traMADoL    Review of Systems:  Review of Systems   Constitutional:  Positive for malaise/fatigue. Negative for  chills, fever and weight loss.   HENT:  Negative for hearing loss and nosebleeds.    Eyes:  Negative for blurred vision, double vision and photophobia.   Respiratory:  Negative for cough, shortness of breath and wheezing.    Cardiovascular:  Positive for chest pain. Negative for palpitations and leg swelling.   Gastrointestinal:  Positive for abdominal pain and nausea. Negative for constipation, diarrhea, heartburn and vomiting.   Genitourinary:  Negative for dysuria, frequency and urgency.   Musculoskeletal:  Negative for falls, joint pain and myalgias.   Skin:  Negative for itching and rash.   Neurological:  Positive for weakness. Negative for dizziness, speech change, focal weakness, loss of consciousness and headaches.   Endo/Heme/Allergies:  Does not bruise/bleed easily.   Psychiatric/Behavioral:  Negative for depression and substance abuse. The patient is not nervous/anxious.      OBJECTIVE:     Vital Signs and IO:  Temp:  [98.2 °F (36.8 °C)-99.4 °F (37.4 °C)]   Pulse:  [85-92]   Resp:  [16-20]   BP: (147-198)/()   SpO2:  [88 %-100 %]   I/O last 3 completed shifts:  In: 1821 [P.O.:1000; Blood:571; Other:250]  Out: 4570 [Other:4570]  Wt Readings from Last 5 Encounters:   12/02/22 59 kg (129 lb 15.7 oz)   11/19/22 56 kg (123 lb 7.3 oz)   11/20/22 56 kg (123 lb 7.3 oz)   11/12/22 57.1 kg (125 lb 14.1 oz)   11/06/22 59.9 kg (132 lb)     Body mass index is 23.77 kg/m².    Physical Exam  Constitutional:       General: She is not in acute distress.     Appearance: She is well-developed. She is not diaphoretic.   HENT:      Head: Normocephalic and atraumatic.      Mouth/Throat:      Mouth: Mucous membranes are moist.   Eyes:      General: No scleral icterus.     Pupils: Pupils are equal, round, and reactive to light.   Cardiovascular:      Rate and Rhythm: Normal rate and regular rhythm.   Pulmonary:      Effort: Pulmonary effort is normal. No respiratory distress.      Breath sounds: No stridor.   Abdominal:       General: There is no distension.      Palpations: Abdomen is soft.   Musculoskeletal:         General: No deformity. Normal range of motion.      Cervical back: Neck supple.   Skin:     General: Skin is warm and dry.      Findings: No erythema or rash.   Neurological:      Mental Status: She is alert and oriented to person, place, and time.      Cranial Nerves: No cranial nerve deficit.   Psychiatric:         Behavior: Behavior normal.       Laboratory:  Recent Labs   Lab 12/05/22  0813 12/06/22  0547 12/07/22  0525    134* 136   K 4.5 5.3* 4.0    101 102   CO2 25 22* 25   BUN 23* 32* 20   CREATININE 4.5* 5.5* 3.5*   * 423* 230*         Recent Labs   Lab 12/01/22  0542 12/02/22  0537 12/03/22  0456 12/04/22  0843 12/05/22  0813 12/06/22  0547 12/07/22  0525   CALCIUM 8.8 8.7 8.6* 8.8 8.9 8.3* 8.9   ALBUMIN 2.8* 2.9* 2.8* 2.9* 2.7* 2.8* 2.8*   PHOS 5.2* 3.6 3.7  --   --   --   --    MG 1.8 1.9 2.1 1.9  --   --   --          Recent Labs   Lab 07/08/22  0516   PTH, Intact 289.8 H         Recent Labs   Lab 12/05/22  1139 12/05/22  1712 12/05/22  2029 12/06/22  0821 12/06/22  1002 12/06/22  1055 12/06/22  1156 12/06/22  2226 12/07/22  0745 12/07/22  1142   POCTGLUCOSE 166* 96 120* 423* 297* 192* 161* 267* 247* 254*         Recent Labs   Lab 05/15/22  1954 07/22/22  1653 08/24/22  0218   Hemoglobin A1C 5.8 H 6.0 H 6.2         Recent Labs   Lab 12/05/22  0813 12/06/22  0547 12/07/22  0525   WBC 4.67 4.61 6.95   HGB 7.9* 7.7* 10.7*   HCT 24.4* 23.1* 31.6*   PLT 70* 72* 90*   MCV 86 85 82   MCHC 32.4 33.3 33.9   MONO 7.7  0.4 9.5  0.4 11.2  0.8         Recent Labs   Lab 12/05/22  0813 12/06/22  0547 12/07/22  0525   BILITOT 1.7* 1.4* 1.9*   PROT 5.9* 6.0 6.5   ALBUMIN 2.7* 2.8* 2.8*   ALKPHOS 309* 307* 319*   ALT 47* 44 41   AST 27 27 23         Recent Labs   Lab 10/16/22  1736 10/24/22  0107 11/19/22  1210   Color, UA Yellow Yellow Yellow   Appearance, UA Hazy A Clear Clear   pH, UA 7.0 8.0 8.0    Specific Gravity, UA 1.020 1.020 1.025   Protein, UA 4+ 4+ 4+   Glucose, UA 4+ A 4+ A 4+ A   Ketones, UA Trace A Negative Negative   Urobilinogen, UA Negative Negative Negative   Bilirubin (UA) Negative Negative 1+ A   Occult Blood UA 2+ A 2+ A 2+ A   Nitrite, UA Negative Negative Negative   RBC, UA 12 H 41 H 38 H   WBC, UA >100 H 24 H 25 H   Bacteria Negative Negative Negative   Hyaline Casts, UA 7 A 6 A 2 A         Recent Labs   Lab 10/04/22  0901 10/16/22  1643 11/30/22  1942   POC PH 7.378 7.398 7.434   POC PCO2 54.4 H 37.6 39.8   POC HCO3 32.0 H 23.2 L 26.6   POC PO2 26 L 55 52   POC SATURATED O2 44 L 88 L 87 L   POC BE 7 -2 2   Sample VENOUS VENOUS VENOUS         Microbiology Results (last 7 days)       ** No results found for the last 168 hours. **            ASSESSMENT/PLAN:     ESRD on HD TTS via AVF  Clearance parameters are at goal    Anemia of CKD  Transfused 2 PRBC on 12/1  Hb lower today. Giving 2 units of pRBC today with hd.  Goal hg in ESRD patients is 10-12    Continue SHELLEY with HD    MBD / Secondary HPT  No acute binder needs    HTN  Controlled  UF with HD    Abdominal pain  GI consulted  EGD and Colonoscopy previously unremarkable  Agree with being judicious with opiate based analgesia.  This is her 14th admission in the last 6 months            Thank you for allowing us to participate in the care of your patient!   We will follow the patient and provide recommendations as needed.    Patient care time was spent personally by me on the following activities: > 35 min    Obtaining a history.  Examination of patient.  Providing medical care at the patients bedside.  Developing a treatment plan with patient or surrogate and bedside caregivers.  Ordering and reviewing laboratory studies, radiographic studies, pulse oximetry.  Ordering and performing treatments and interventions.  Evaluation of patient's response to treatment.  Discussions with consultants while on the unit and immediately available to  the patient.  Re-evaluation of the patient's condition.  Documentation in the medical record.     Hugh Figueroa MD    [facility]  [address] [place] [address]  [place] [address]    [phone] - tel  [phone] - fax    12/7/2022

## 2022-12-07 NOTE — PLAN OF CARE
Pt cleared for discharge from case management     12/07/22 1212   Final Note   Assessment Type Final Discharge Note   Anticipated Discharge Disposition Home   What phone number can be called within the next 1-3 days to see how you are doing after discharge?   ([phone])   Hospital Resources/Appts/Education Provided Appointments scheduled and added to AVS

## 2022-12-07 NOTE — DISCHARGE SUMMARY
[facility] Medicine  Discharge Summary      Patient Name: Tabby Howard  MRN: [id number]  UNIQUE: [id number]  Patient Class: IP- Inpatient  Admission Date: 11/30/2022  Hospital Length of Stay: 3 days  Discharge Date and Time:  12/07/2022 1:15 PM  Attending Physician: Max Garcia MD   Discharging Provider: Max Garcia MD  Primary Care Provider: [facility]    Primary Care Team: Networked reference to record PCT     HPI:   Tabby Howard is a 32 year old female with ESRD on HD, DM, HFpEF, GERD, suspected gastroparesis, sickle cell trait, HTN, who presented to the ED for evaluation of abdominal pain with nausea,vomiting and diarrhea. She has had multiple ED visits and admissions for the same complaint in the past, for intractable nausea and vomiting as well as for gastroparesis. She is a dialysis patient, with her last session performed Tuesday. She states she has had chest discomfort and abdominal bloating since then. She denies urinary complaint, blood in vomit or stool, fever, chills or other complaint.    ED work up included a CBC which showed chronic anemia and thrombocytopenia. CMP showed an elevated glucose of 587 with no evidence of acidosis. Liver enzymes are elevated from baseline today.  She was given a small fluid bolus in the ED, as well as one dose of morphine and several antiemetics. She reported relief after receiving droperidol. EKG was reviewed with no sign of acute ischemia or infarction. She was given IV insulin as well. Hospital Medicine consulted for admission and further management.      Procedure(s) (LRB):  EGD (ESOPHAGOGASTRODUODENOSCOPY) (N/A)      Hospital Course:   Pt was admitted with unrelenting abdominal pain and nausea with intermittent vomiting.  Was given analgesics and anti-emetics.  The nausea became less intense and vomiting resolved.  However, the pain remained significant.  Symptomatic treatment was continued.   Suspected her symptoms were related to gastroparesis and likely gastritis as well. She was kept on PPI.  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 We made sure her bowels kept moving on bowel regimen. Abd XR flat/erect without acute findings. Lipase WNL. Lactic acid and CRP WNL. Bentyl scheduled. GI cocktail with viscous lidocaine ordered with minimal effect. Took a clear liquid diet but did report some mildly increased pain. GI consulted and performed EGD which showed patchy areas of inflammation likely edema and gastritis. EGD biopsies taken. GI recommended discontinuation of her dilaudid as her symptoms were attributed to GI tract dysmotility related to her chronic conditions. Acetaminophen and tramadol were used in place of dilaudid to minimalize the effects of opiates on motility per GI recommendation. She frequently asked for dilaudid and was upset with its discontinuation. She continued with similar pain and tolerated a full liquid diet. Underwent routine dialysis on her scheduled days. Required few red blood cell transfusions with dialysis per nephrology for low H/H. No other signs of bleeding. Tele-psych consult done and mirtazapine added per recommendation of possible adjustment disorder. Diabetic/renal diet ordered which she did not prefer most of the options offered so ended up mostly eating cereal which she tolerated. She will be discharged with close PCP follow up. Psych referral was placed as well. See med rec for new meds and/or changes. By time of discharge the patient was tolerating regular foods with improved admission symptoms.    Physical Exam on day of discharge:  Constitutional:       General: She is not in acute distress.     Appearance: She is not ill-appearing.   HENT:      Head: Normocephalic and atraumatic.   Neck:      Vascular: No JVD.   Cardiovascular:      Rate and Rhythm: Normal rate and regular rhythm.   Pulmonary:      Effort:  Pulmonary effort is normal.      Breath sounds: Normal breath sounds.   Abdominal:      General: Abdomen is flat. Bowel sounds are normal. There is no distension.      Palpations: Abdomen is soft.      Tenderness: There is generalized abdominal tenderness (only mildly exacerbating her pain, worse in epigastrium). There is no guarding or rebound.   Musculoskeletal:      Right lower leg: No edema.      Left lower leg: No edema.   Skin:     General: Skin is warm and moist.      Findings: No rash.   Neurological:      Mental Status: She is alert and oriented to person, place, and time.   Psychiatric:         Attention and Perception: Attention normal.         Mood and Affect: Affect normal.         Speech: Speech normal.          Goals of Care Treatment Preferences:  Code Status: Full Code    Health care agent: Step-Mother Tanya Howard / Father Garcia Howard  Health care agent number: [phone] / [phone]          What is most important right now is to focus on remaining as independent as possible.  Accordingly, we have decided that the best plan to meet the patient's goals includes continuing with treatment.      Consults:   Consults (From admission, onward)        Status Ordering Provider     Inpatient consult to Telemedicine - Psych  Once        Provider:  (Not yet assigned)    Acknowledged KOKO YUSUF     Inpatient consult to Gastroenterology  Once        Provider:  (Not yet assigned)    Completed KOKO YUSUF     Inpatient consult to Nephrology  Once        Provider:  Mando Benitez MD    Completed RIVAS PANIAGUA new Assessment & Plan notes have been filed under this hospital service since the last note was generated.  Service: Hospital Medicine    Final Active Diagnoses:    Diagnosis Date Noted POA    PRINCIPAL PROBLEM:  Intractable generalized abdominal pain [R10.84] 12/02/2022 Yes    Intractable nausea and vomiting [R11.2] 10/16/2019 Yes    Adjustment disorder [F43.20]  12/07/2022 Yes    Anemia due to chronic kidney disease, on chronic dialysis [N18.6, D63.1, Z99.2] 10/26/2022 Not Applicable    Malnutrition of moderate degree [E44.0] 10/05/2022 Yes    Hypertension [I10] 07/30/2022 Yes    Deep vein thrombosis (DVT) of non-extremity vein [I82.90] 07/26/2022 Yes     Chronic    ESRD (end stage renal disease) on dialysis [N18.6, Z99.2] 07/22/2022 Not Applicable    Seizure [R56.9] 05/29/2022 Yes    Type 2 diabetes mellitus with chronic kidney disease on chronic dialysis, with long-term current use of insulin [E11.22, N18.6, Z99.2, Z79.4] 10/16/2019 Not Applicable     Chronic    Gastritis [K29.70] 10/15/2019 Yes      Problems Resolved During this Admission:       Discharged Condition: good    Disposition: Home or Self Care    Follow Up:   Follow-up Information     [facility]. Go to.    Why: APPOINTMENT:  December 12, 2022 at 12:15pm  Jean Galvan NP  [facility] Follow up  Contact information:  [address] [facility] [id number]                     Patient Instructions:      Ambulatory referral/consult to Psychiatry   Standing Status: Future   Referral Priority: Routine Referral Type: Psychiatric   Referral Reason: Specialty Services Required   Requested Specialty: Psychiatry   Number of Visits Requested: 1     Diet diabetic     Notify your health care provider if you experience any of the following:  temperature >100.4     Notify your health care provider if you experience any of the following:  persistent nausea and vomiting or diarrhea     Notify your health care provider if you experience any of the following:  severe uncontrolled pain     Notify your health care provider if you experience any of the following:  redness, tenderness, or signs of infection (pain, swelling, redness, odor or green/yellow discharge around incision site)     Notify your health care provider if you experience any of the following:   difficulty breathing or increased cough     Notify your health care provider if you experience any of the following:  severe persistent headache     Notify your health care provider if you experience any of the following:  worsening rash     Notify your health care provider if you experience any of the following:  persistent dizziness, light-headedness, or visual disturbances     Notify your health care provider if you experience any of the following:  increased confusion or weakness     Activity as tolerated       Significant Diagnostic Studies:     .  Lab Results   Component Value Date    WBC 6.95 12/07/2022    HGB 10.7 (L) 12/07/2022    HCT 31.6 (L) 12/07/2022    MCV 82 12/07/2022    PLT 90 (L) 12/07/2022       BMP  Lab Results   Component Value Date     12/07/2022    K 4.0 12/07/2022     12/07/2022    CO2 25 12/07/2022    BUN 20 12/07/2022    CREATININE 3.5 (H) 12/07/2022    CALCIUM 8.9 12/07/2022    ANIONGAP 9 12/07/2022    EGFRNORACEVR 17 (A) 12/07/2022     Recent Labs   Lab 12/06/22  1530   TROPONINI 0.024     Lab Results   Component Value Date    CRP 1.7 12/04/2022       Lactate (Lactic Acid) 0.5 - 2.2 mmol/L 0.5            XR ABDOMEN FLAT AND ERECT     CLINICAL HISTORY:  abdominal pain;     TECHNIQUE:  Flat and erect AP views of the abdomen were performed.     COMPARISON:  10/06/2022     FINDINGS:  Partially imaged right-sided central venous catheter with tip projected over the atrium.  Cardiomediastinal silhouette is enlarged, unchanged.  Surgical clips project over the right upper quadrant and pelvis.  The bowel gas pattern is nonobstructive.No evidence of free intraperitoneal air.  No airspace consolidation at the lung bases.No acute bony abnormality.No abnormal soft tissue masses.No abnormal calcific densities overlying the kidneys identified.     Impression:     No acute radiographic findings in the abdomen.            Pending Diagnostic Studies:     Procedure Component Value Units  Date/Time    EKG 12-lead [[id number]] Collected: 12/06/22 1503    Order Status: Sent Lab Status: In process Updated: 12/06/22 1515    Narrative:      Test Reason : R07.9,    Vent. Rate : 094 BPM     Atrial Rate : 094 BPM     P-R Int : 186 ms          QRS Dur : 084 ms      QT Int : 370 ms       P-R-T Axes : 048 -38 063 degrees     QTc Int : 462 ms    Normal sinus rhythm  Left axis deviation  Abnormal ECG  When compared with ECG of 06-DEC-2022 02:08,  No significant change was found    Referred By: AAAREFGANGA   SELF           Confirmed By:     EKG 12-lead [[id number]] Collected: 12/06/22 0208    Order Status: Sent Lab Status: In process Updated: 12/06/22 0957    Narrative:      Test Reason : R94.31,    Vent. Rate : 078 BPM     Atrial Rate : 078 BPM     P-R Int : 182 ms          QRS Dur : 086 ms      QT Int : 428 ms       P-R-T Axes : 026 -34 063 degrees     QTc Int : 487 ms    Normal sinus rhythm  Left axis deviation  Anterior infarct ,age undetermined  Abnormal ECG  When compared with ECG of 30-NOV-2022 18:37,  T wave inversion no longer evident in Inferior leads  Nonspecific T wave abnormality now evident in Lateral leads    Referred By: AAAREFGANGA   SELF           Confirmed By:     Specimen to Pathology, Surgery Gastrointestinal tract [[id number]] Collected: 12/05/22 1038    Order Status: Sent Lab Status: In process Updated: 12/05/22 1319    Specimen: Tissue          Medications:  Reconciled Home Medications:      Medication List      START taking these medications    dicyclomine 10 MG capsule  Commonly known as: BENTYL  Take 1 capsule (10 mg total) by mouth every 8 (eight) hours as needed (abdominal pain).     mirtazapine 15 MG disintegrating tablet  Commonly known as: REMERON SOL-TAB  Take 1 tablet (15 mg total) by mouth nightly.     promethazine 12.5 MG Tab  Commonly known as: PHENERGAN  Take 1 tablet (12.5 mg total) by mouth every 12 (twelve) hours as needed (nausea/vomiting not relieved with ondansetron  "(zofran)).     traMADoL 50 mg tablet  Commonly known as: ULTRAM  Take 1 tablet (50 mg total) by mouth every 8 (eight) hours as needed for Pain.        CHANGE how you take these medications    insulin detemir U-100 100 unit/mL (3 mL) Inpn pen  Commonly known as: Levemir FLEXTOUCH  Inject 9 Units into the skin once daily.  What changed: how much to take        CONTINUE taking these medications    albuterol 90 mcg/actuation inhaler  Commonly known as: PROVENTIL/VENTOLIN HFA  Inhale 2 puffs into the lungs every 6 (six) hours as needed for Wheezing. Rescue     amLODIPine 10 MG tablet  Commonly known as: NORVASC  Take 1 tablet (10 mg total) by mouth once daily.     BD ULTRA-FINE MAGALIE PEN NEEDLE 32 gauge x 5/32" Ndle  Generic drug: pen needle, diabetic  Inject into the skin 5 times per day with insulin     carvediloL 25 MG tablet  Commonly known as: COREG  Take 1 tablet (25 mg total) by mouth 2 (two) times daily.     cloNIDine 0.2 mg/24 hr td ptwk 0.2 mg/24 hr  Commonly known as: CATAPRES  Place 1 patch onto the skin every 7 days.     ELIQUIS 5 mg Tab  Generic drug: apixaban  Take 1 tablet (5 mg total) by mouth 2 (two) times daily.     FLUoxetine 20 MG capsule  Take 1 capsule (20 mg total) by mouth once daily.     gabapentin 100 MG capsule  Commonly known as: NEURONTIN  Take 1 capsule (100 mg total) by mouth 2 (two) times daily.     hydrALAZINE 100 MG tablet  Commonly known as: APRESOLINE  Take 1 tablet (100 mg total) by mouth every 8 (eight) hours.     isosorbide mononitrate 60 MG 24 hr tablet  Commonly known as: IMDUR  Take 2 tablets (120 mg total) by mouth once daily.     levETIRAcetam 500 MG Tab  Commonly known as: KEPPRA  Take 1 tablet (500 mg total) by mouth 2 (two) times daily.     methocarbamoL 500 MG Tab  Commonly known as: ROBAXIN  Take 1 tablet (500 mg total) by mouth 3 (three) times daily.     NovoLOG Flexpen U-100 Insulin 100 unit/mL (3 mL) Inpn pen  Generic drug: insulin aspart U-100  Inject 1-10 Units into " the skin before meals and at bedtime as needed (Hyperglycemia). Max daily dose 40 units.     ondansetron 4 MG tablet  Commonly known as: ZOFRAN  Take 1 tablet (4 mg total) by mouth every 8 (eight) hours as needed for Nausea.     pantoprazole 40 MG tablet  Commonly known as: PROTONIX  Take 40 mg by mouth once daily.        STOP taking these medications    insulin glargine 100 unit/mL injection  Commonly known as: Lantus            Indwelling Lines/Drains at time of discharge:   Lines/Drains/Airways     Central Venous Catheter Line  Duration                Hemodialysis Catheter 07/26/22 1457 right internal jugular 133 days                Time spent on the discharge of patient: 38 minutes         Max Garcia MD  Department of Hospital Medicine  [facility]

## 2022-12-07 NOTE — PLAN OF CARE
Plan of care reviewed with patient. Patient verbalized complete understanding. Pt awake, alert, and oriented. Pt was complaining of severe pain at start of shift. Pt received droperidol and has not complained of pain since. Pt resting comfortably in bed. Pt on tele monitoring.  All fall precautions maintained, bed in lowest position, locked, call light within reach. Side rails up times 2. Slip resistant socks maintained.

## 2022-12-07 NOTE — PROGRESS NOTES
Pt d/c education provided, pt verbalized understanding. PIV and Tele removed. Tolerated well. Discharge instructions in hand. Pt transported via wheelchair to stepmother's vehicle.

## 2022-12-07 NOTE — PLAN OF CARE
Spoke with pts father Garcia regarding discharge. Garcia stated that his wife Tanya will be here in about 2 hours to  pt. KINGSLEY Mccracken, pts primary nurse notified.

## 2022-12-07 NOTE — PLAN OF CARE
Problem: Adult Inpatient Plan of Care  Goal: Plan of Care Review  Outcome: Ongoing, Progressing     Problem: Adult Inpatient Plan of Care  Goal: Optimal Comfort and Wellbeing  Outcome: Ongoing, Progressing     POC reviewed with pt, verbalized understanding. Pt AAO x 4. Meds given per MAR. IVF saline locked, clean, dry, intact. Pt received dialysis this shift. Dialysis nurse reported 3.5 L removed. 2 PRBC's administered during dialysis. Tolerated well. Pt pain tolerance declined by end of shift. Pt complaining of no pain relief after adm of pain meds. MD notified. Safety maintained with side rails up x3, bed locked and in lowest positioned, call light in reach. Pt educated to call for assistance with ambulation, verbalized understanding. Pt remains free of falls. Will continue to monitor.

## 2022-12-07 NOTE — NURSING
Pt has orders for clonidine if SBP is above 180. Went to give the pt her medicine and pt refused medication.

## 2022-12-08 ENCOUNTER — TELEPHONE (OUTPATIENT)
Dept: MEDSURG UNIT | Facility: HOSPITAL | Age: 32
End: 2022-12-08
Payer: MEDICAID

## 2022-12-09 ENCOUNTER — HOSPITAL ENCOUNTER (INPATIENT)
Facility: HOSPITAL | Age: 32
LOS: 5 days | Discharge: HOME | DRG: 637 | End: 2022-12-14
Attending: EMERGENCY MEDICINE | Admitting: STUDENT IN AN ORGANIZED HEALTH CARE EDUCATION/TRAINING PROGRAM
Payer: MEDICAID

## 2022-12-09 DIAGNOSIS — E11.10 DKA (DIABETIC KETOACIDOSIS): ICD-10-CM

## 2022-12-09 DIAGNOSIS — R07.89 CHEST DISCOMFORT: ICD-10-CM

## 2022-12-09 DIAGNOSIS — E11.10 DIABETIC KETOACIDOSIS WITHOUT COMA ASSOCIATED WITH TYPE 2 DIABETES MELLITUS: ICD-10-CM

## 2022-12-09 DIAGNOSIS — R10.84 GENERALIZED ABDOMINAL PAIN: ICD-10-CM

## 2022-12-09 DIAGNOSIS — R73.9 HYPERGLYCEMIA: Primary | ICD-10-CM

## 2022-12-09 PROBLEM — A49.8 CLOSTRIDIUM DIFFICILE INFECTION: Status: RESOLVED | Noted: 2022-10-18 | Resolved: 2022-12-09

## 2022-12-09 PROBLEM — D63.1 ANEMIA OF CHRONIC KIDNEY FAILURE, STAGE 5: Chronic | Status: ACTIVE | Noted: 2022-10-31

## 2022-12-09 PROBLEM — R11.2 INTRACTABLE NAUSEA AND VOMITING: Status: RESOLVED | Noted: 2019-10-16 | Resolved: 2022-12-09

## 2022-12-09 PROBLEM — G40.909 SEIZURE DISORDER: Chronic | Status: ACTIVE | Noted: 2022-05-29

## 2022-12-09 PROBLEM — N18.5 ANEMIA OF CHRONIC KIDNEY FAILURE, STAGE 5: Chronic | Status: ACTIVE | Noted: 2022-10-31

## 2022-12-09 PROBLEM — Z99.2 ESRD (END STAGE RENAL DISEASE) ON DIALYSIS: Chronic | Status: ACTIVE | Noted: 2022-07-22

## 2022-12-09 PROBLEM — N18.6 ESRD (END STAGE RENAL DISEASE) ON DIALYSIS: Chronic | Status: ACTIVE | Noted: 2022-07-22

## 2022-12-09 LAB
ALBUMIN SERPL BCP-MCNC: 3.7 G/DL (ref 3.5–5.2)
ALLENS TEST: ABNORMAL
ALP SERPL-CCNC: 377 U/L (ref 55–135)
ALT SERPL W/O P-5'-P-CCNC: 38 U/L (ref 10–44)
ANION GAP SERPL CALC-SCNC: 15 MMOL/L (ref 8–16)
ANION GAP SERPL CALC-SCNC: 17 MMOL/L (ref 8–16)
ANION GAP SERPL CALC-SCNC: 19 MMOL/L (ref 8–16)
ANION GAP SERPL CALC-SCNC: 19 MMOL/L (ref 8–16)
AST SERPL-CCNC: 33 U/L (ref 10–40)
B-OH-BUTYR BLD STRIP-SCNC: 3.8 MMOL/L (ref 0–0.5)
BASOPHILS # BLD AUTO: 0.03 K/UL (ref 0–0.2)
BASOPHILS NFR BLD: 0.4 % (ref 0–1.9)
BILIRUB SERPL-MCNC: 3.3 MG/DL (ref 0.1–1)
BUN SERPL-MCNC: 42 MG/DL (ref 6–20)
BUN SERPL-MCNC: 49 MG/DL (ref 6–20)
BUN SERPL-MCNC: 52 MG/DL (ref 6–20)
BUN SERPL-MCNC: 53 MG/DL (ref 6–20)
CALCIUM SERPL-MCNC: 9.3 MG/DL (ref 8.7–10.5)
CALCIUM SERPL-MCNC: 9.4 MG/DL (ref 8.7–10.5)
CALCIUM SERPL-MCNC: 9.4 MG/DL (ref 8.7–10.5)
CALCIUM SERPL-MCNC: 9.7 MG/DL (ref 8.7–10.5)
CHLORIDE SERPL-SCNC: 90 MMOL/L (ref 95–110)
CHLORIDE SERPL-SCNC: 93 MMOL/L (ref 95–110)
CHLORIDE SERPL-SCNC: 96 MMOL/L (ref 95–110)
CHLORIDE SERPL-SCNC: 99 MMOL/L (ref 95–110)
CO2 SERPL-SCNC: 21 MMOL/L (ref 23–29)
CO2 SERPL-SCNC: 21 MMOL/L (ref 23–29)
CO2 SERPL-SCNC: 22 MMOL/L (ref 23–29)
CO2 SERPL-SCNC: 22 MMOL/L (ref 23–29)
CREAT SERPL-MCNC: 3.4 MG/DL (ref 0.5–1.4)
CREAT SERPL-MCNC: 3.6 MG/DL (ref 0.5–1.4)
CREAT SERPL-MCNC: 3.7 MG/DL (ref 0.5–1.4)
CREAT SERPL-MCNC: 3.8 MG/DL (ref 0.5–1.4)
DIFFERENTIAL METHOD: ABNORMAL
EOSINOPHIL # BLD AUTO: 0.1 K/UL (ref 0–0.5)
EOSINOPHIL NFR BLD: 1.1 % (ref 0–8)
ERYTHROCYTE [DISTWIDTH] IN BLOOD BY AUTOMATED COUNT: 16.1 % (ref 11.5–14.5)
EST. GFR  (NO RACE VARIABLE): 15 ML/MIN/1.73 M^2
EST. GFR  (NO RACE VARIABLE): 16 ML/MIN/1.73 M^2
EST. GFR  (NO RACE VARIABLE): 16 ML/MIN/1.73 M^2
EST. GFR  (NO RACE VARIABLE): 18 ML/MIN/1.73 M^2
GLUCOSE SERPL-MCNC: 350 MG/DL (ref 70–110)
GLUCOSE SERPL-MCNC: 504 MG/DL (ref 70–110)
GLUCOSE SERPL-MCNC: 639 MG/DL (ref 70–110)
GLUCOSE SERPL-MCNC: 645 MG/DL (ref 70–110)
GLUCOSE SERPL-MCNC: 719 MG/DL (ref 70–110)
HCG INTACT+B SERPL-ACNC: 1.2 MIU/ML
HCO3 UR-SCNC: 27.3 MMOL/L (ref 24–28)
HCT VFR BLD AUTO: 30.5 % (ref 37–48.5)
HGB BLD-MCNC: 10.5 G/DL (ref 12–16)
IMM GRANULOCYTES # BLD AUTO: 0.02 K/UL (ref 0–0.04)
IMM GRANULOCYTES NFR BLD AUTO: 0.3 % (ref 0–0.5)
LIPASE SERPL-CCNC: 32 U/L (ref 4–60)
LYMPHOCYTES # BLD AUTO: 0.7 K/UL (ref 1–4.8)
LYMPHOCYTES NFR BLD: 9 % (ref 18–48)
MCH RBC QN AUTO: 28 PG (ref 27–31)
MCHC RBC AUTO-ENTMCNC: 34.4 G/DL (ref 32–36)
MCV RBC AUTO: 81 FL (ref 82–98)
MONOCYTES # BLD AUTO: 0.5 K/UL (ref 0.3–1)
MONOCYTES NFR BLD: 6.3 % (ref 4–15)
NEUTROPHILS # BLD AUTO: 6 K/UL (ref 1.8–7.7)
NEUTROPHILS NFR BLD: 82.9 % (ref 38–73)
NRBC BLD-RTO: 0 /100 WBC
PCO2 BLDA: 38.1 MMHG (ref 35–45)
PH SMN: 7.46 [PH] (ref 7.35–7.45)
PLATELET # BLD AUTO: 63 K/UL (ref 150–450)
PMV BLD AUTO: 11.1 FL (ref 9.2–12.9)
PO2 BLDA: 43 MMHG (ref 40–60)
POC BE: 4 MMOL/L
POC SATURATED O2: 81 % (ref 95–100)
POC TCO2: 28 MMOL/L (ref 24–29)
POCT GLUCOSE: 303 MG/DL (ref 70–110)
POCT GLUCOSE: 341 MG/DL (ref 70–110)
POCT GLUCOSE: 363 MG/DL (ref 70–110)
POCT GLUCOSE: 413 MG/DL (ref 70–110)
POCT GLUCOSE: 449 MG/DL (ref 70–110)
POCT GLUCOSE: >500 MG/DL (ref 70–110)
POTASSIUM SERPL-SCNC: 3.3 MMOL/L (ref 3.5–5.1)
POTASSIUM SERPL-SCNC: 3.9 MMOL/L (ref 3.5–5.1)
POTASSIUM SERPL-SCNC: 4.2 MMOL/L (ref 3.5–5.1)
POTASSIUM SERPL-SCNC: 4.7 MMOL/L (ref 3.5–5.1)
PROT SERPL-MCNC: 8.4 G/DL (ref 6–8.4)
RBC # BLD AUTO: 3.75 M/UL (ref 4–5.4)
SAMPLE: ABNORMAL
SITE: ABNORMAL
SODIUM SERPL-SCNC: 128 MMOL/L (ref 136–145)
SODIUM SERPL-SCNC: 134 MMOL/L (ref 136–145)
SODIUM SERPL-SCNC: 136 MMOL/L (ref 136–145)
SODIUM SERPL-SCNC: 136 MMOL/L (ref 136–145)
WBC # BLD AUTO: 7.25 K/UL (ref 3.9–12.7)

## 2022-12-09 PROCEDURE — 93010 ELECTROCARDIOGRAM REPORT: CPT | Mod: ,,, | Performed by: INTERNAL MEDICINE

## 2022-12-09 PROCEDURE — 99285 EMERGENCY DEPT VISIT HI MDM: CPT | Mod: 25

## 2022-12-09 PROCEDURE — 25000003 PHARM REV CODE 250: Performed by: STUDENT IN AN ORGANIZED HEALTH CARE EDUCATION/TRAINING PROGRAM

## 2022-12-09 PROCEDURE — 25000003 PHARM REV CODE 250: Performed by: EMERGENCY MEDICINE

## 2022-12-09 PROCEDURE — 93005 ELECTROCARDIOGRAM TRACING: CPT

## 2022-12-09 PROCEDURE — 82962 GLUCOSE BLOOD TEST: CPT | Mod: 59

## 2022-12-09 PROCEDURE — 36415 COLL VENOUS BLD VENIPUNCTURE: CPT | Performed by: STUDENT IN AN ORGANIZED HEALTH CARE EDUCATION/TRAINING PROGRAM

## 2022-12-09 PROCEDURE — 84702 CHORIONIC GONADOTROPIN TEST: CPT | Performed by: EMERGENCY MEDICINE

## 2022-12-09 PROCEDURE — 96374 THER/PROPH/DIAG INJ IV PUSH: CPT

## 2022-12-09 PROCEDURE — 85025 COMPLETE CBC W/AUTO DIFF WBC: CPT | Performed by: NURSE PRACTITIONER

## 2022-12-09 PROCEDURE — 25000003 PHARM REV CODE 250: Performed by: INTERNAL MEDICINE

## 2022-12-09 PROCEDURE — 63600175 PHARM REV CODE 636 W HCPCS: Performed by: NURSE PRACTITIONER

## 2022-12-09 PROCEDURE — 20600001 HC STEP DOWN PRIVATE ROOM

## 2022-12-09 PROCEDURE — 96375 TX/PRO/DX INJ NEW DRUG ADDON: CPT

## 2022-12-09 PROCEDURE — 63600175 PHARM REV CODE 636 W HCPCS: Performed by: INTERNAL MEDICINE

## 2022-12-09 PROCEDURE — 80053 COMPREHEN METABOLIC PANEL: CPT | Performed by: NURSE PRACTITIONER

## 2022-12-09 PROCEDURE — 63600175 PHARM REV CODE 636 W HCPCS: Performed by: STUDENT IN AN ORGANIZED HEALTH CARE EDUCATION/TRAINING PROGRAM

## 2022-12-09 PROCEDURE — 25000003 PHARM REV CODE 250: Performed by: NURSE PRACTITIONER

## 2022-12-09 PROCEDURE — 82947 ASSAY GLUCOSE BLOOD QUANT: CPT | Performed by: STUDENT IN AN ORGANIZED HEALTH CARE EDUCATION/TRAINING PROGRAM

## 2022-12-09 PROCEDURE — 99900035 HC TECH TIME PER 15 MIN (STAT)

## 2022-12-09 PROCEDURE — 96361 HYDRATE IV INFUSION ADD-ON: CPT

## 2022-12-09 PROCEDURE — 36415 COLL VENOUS BLD VENIPUNCTURE: CPT | Performed by: NURSE PRACTITIONER

## 2022-12-09 PROCEDURE — 36415 COLL VENOUS BLD VENIPUNCTURE: CPT | Performed by: EMERGENCY MEDICINE

## 2022-12-09 PROCEDURE — C1751 CATH, INF, PER/CENT/MIDLINE: HCPCS

## 2022-12-09 PROCEDURE — 80048 BASIC METABOLIC PNL TOTAL CA: CPT | Mod: XB | Performed by: STUDENT IN AN ORGANIZED HEALTH CARE EDUCATION/TRAINING PROGRAM

## 2022-12-09 PROCEDURE — 83690 ASSAY OF LIPASE: CPT | Performed by: NURSE PRACTITIONER

## 2022-12-09 PROCEDURE — 93010 EKG 12-LEAD: ICD-10-PCS | Mod: ,,, | Performed by: INTERNAL MEDICINE

## 2022-12-09 PROCEDURE — 82010 KETONE BODYS QUAN: CPT | Performed by: EMERGENCY MEDICINE

## 2022-12-09 PROCEDURE — 82803 BLOOD GASES ANY COMBINATION: CPT

## 2022-12-09 PROCEDURE — 63600175 PHARM REV CODE 636 W HCPCS: Performed by: EMERGENCY MEDICINE

## 2022-12-09 PROCEDURE — 36410 VNPNXR 3YR/> PHY/QHP DX/THER: CPT

## 2022-12-09 RX ORDER — TORSEMIDE 20 MG/1
20 TABLET ORAL DAILY
Status: DISCONTINUED | OUTPATIENT
Start: 2022-12-09 | End: 2022-12-09

## 2022-12-09 RX ORDER — DROPERIDOL 2.5 MG/ML
2.5 INJECTION, SOLUTION INTRAMUSCULAR; INTRAVENOUS
Status: COMPLETED | OUTPATIENT
Start: 2022-12-09 | End: 2022-12-09

## 2022-12-09 RX ORDER — DEXTROSE MONOHYDRATE AND SODIUM CHLORIDE 5; .9 G/100ML; G/100ML
INJECTION, SOLUTION INTRAVENOUS CONTINUOUS PRN
Status: DISCONTINUED | OUTPATIENT
Start: 2022-12-09 | End: 2022-12-10

## 2022-12-09 RX ORDER — HYDRALAZINE HYDROCHLORIDE 20 MG/ML
20 INJECTION INTRAMUSCULAR; INTRAVENOUS EVERY 4 HOURS PRN
Status: DISCONTINUED | OUTPATIENT
Start: 2022-12-09 | End: 2022-12-14 | Stop reason: HOSPADM

## 2022-12-09 RX ORDER — LEVETIRACETAM 500 MG/5ML
500 INJECTION, SOLUTION, CONCENTRATE INTRAVENOUS ONCE
Status: COMPLETED | OUTPATIENT
Start: 2022-12-09 | End: 2022-12-09

## 2022-12-09 RX ORDER — DIPHENHYDRAMINE HYDROCHLORIDE 50 MG/ML
12.5 INJECTION INTRAMUSCULAR; INTRAVENOUS
Status: COMPLETED | OUTPATIENT
Start: 2022-12-09 | End: 2022-12-09

## 2022-12-09 RX ORDER — SODIUM CHLORIDE 9 MG/ML
INJECTION, SOLUTION INTRAVENOUS ONCE
Status: COMPLETED | OUTPATIENT
Start: 2022-12-09 | End: 2022-12-09

## 2022-12-09 RX ORDER — ISOSORBIDE MONONITRATE 60 MG/1
120 TABLET, EXTENDED RELEASE ORAL DAILY
Status: DISCONTINUED | OUTPATIENT
Start: 2022-12-09 | End: 2022-12-14 | Stop reason: HOSPADM

## 2022-12-09 RX ORDER — SODIUM CHLORIDE 0.9 % (FLUSH) 0.9 %
10 SYRINGE (ML) INJECTION
Status: DISCONTINUED | OUTPATIENT
Start: 2022-12-09 | End: 2022-12-14 | Stop reason: HOSPADM

## 2022-12-09 RX ORDER — LEVETIRACETAM 500 MG/1
500 TABLET ORAL 2 TIMES DAILY
Status: DISCONTINUED | OUTPATIENT
Start: 2022-12-10 | End: 2022-12-14 | Stop reason: HOSPADM

## 2022-12-09 RX ORDER — GABAPENTIN 100 MG/1
100 CAPSULE ORAL 2 TIMES DAILY
Status: DISCONTINUED | OUTPATIENT
Start: 2022-12-09 | End: 2022-12-14 | Stop reason: HOSPADM

## 2022-12-09 RX ORDER — MUPIROCIN 20 MG/G
OINTMENT TOPICAL 2 TIMES DAILY
Status: DISCONTINUED | OUTPATIENT
Start: 2022-12-09 | End: 2022-12-14 | Stop reason: HOSPADM

## 2022-12-09 RX ORDER — LORAZEPAM 2 MG/ML
2 INJECTION INTRAMUSCULAR
Status: DISCONTINUED | OUTPATIENT
Start: 2022-12-09 | End: 2022-12-14 | Stop reason: HOSPADM

## 2022-12-09 RX ORDER — DICYCLOMINE HYDROCHLORIDE 10 MG/1
10 CAPSULE ORAL 3 TIMES DAILY
Status: DISCONTINUED | OUTPATIENT
Start: 2022-12-09 | End: 2022-12-14 | Stop reason: HOSPADM

## 2022-12-09 RX ORDER — LEVETIRACETAM 500 MG/1
500 TABLET ORAL 2 TIMES DAILY
Status: DISCONTINUED | OUTPATIENT
Start: 2022-12-09 | End: 2022-12-09

## 2022-12-09 RX ORDER — ACETAMINOPHEN 325 MG/1
650 TABLET ORAL EVERY 6 HOURS PRN
Status: DISCONTINUED | OUTPATIENT
Start: 2022-12-09 | End: 2022-12-14 | Stop reason: HOSPADM

## 2022-12-09 RX ORDER — FLUOXETINE HYDROCHLORIDE 20 MG/1
20 CAPSULE ORAL NIGHTLY
Status: DISCONTINUED | OUTPATIENT
Start: 2022-12-09 | End: 2022-12-14 | Stop reason: HOSPADM

## 2022-12-09 RX ORDER — HYDRALAZINE HYDROCHLORIDE 25 MG/1
100 TABLET, FILM COATED ORAL EVERY 8 HOURS
Status: DISCONTINUED | OUTPATIENT
Start: 2022-12-09 | End: 2022-12-14 | Stop reason: HOSPADM

## 2022-12-09 RX ORDER — HYDROMORPHONE HYDROCHLORIDE 2 MG/ML
0.5 INJECTION, SOLUTION INTRAMUSCULAR; INTRAVENOUS; SUBCUTANEOUS EVERY 6 HOURS PRN
Status: DISCONTINUED | OUTPATIENT
Start: 2022-12-09 | End: 2022-12-14 | Stop reason: HOSPADM

## 2022-12-09 RX ORDER — AMLODIPINE BESYLATE 5 MG/1
10 TABLET ORAL DAILY
Status: DISCONTINUED | OUTPATIENT
Start: 2022-12-09 | End: 2022-12-14 | Stop reason: HOSPADM

## 2022-12-09 RX ORDER — LABETALOL HYDROCHLORIDE 5 MG/ML
20 INJECTION, SOLUTION INTRAVENOUS ONCE
Status: COMPLETED | OUTPATIENT
Start: 2022-12-09 | End: 2022-12-09

## 2022-12-09 RX ORDER — TRAMADOL HYDROCHLORIDE 50 MG/1
50 TABLET ORAL EVERY 8 HOURS PRN
Status: DISCONTINUED | OUTPATIENT
Start: 2022-12-09 | End: 2022-12-14 | Stop reason: HOSPADM

## 2022-12-09 RX ORDER — CLONIDINE 0.2 MG/24H
1 PATCH, EXTENDED RELEASE TRANSDERMAL
Status: DISCONTINUED | OUTPATIENT
Start: 2022-12-09 | End: 2022-12-14 | Stop reason: HOSPADM

## 2022-12-09 RX ORDER — CARVEDILOL 25 MG/1
25 TABLET ORAL 2 TIMES DAILY
Status: DISCONTINUED | OUTPATIENT
Start: 2022-12-09 | End: 2022-12-14 | Stop reason: HOSPADM

## 2022-12-09 RX ORDER — LABETALOL HYDROCHLORIDE 5 MG/ML
10 INJECTION, SOLUTION INTRAVENOUS EVERY 6 HOURS PRN
Status: DISCONTINUED | OUTPATIENT
Start: 2022-12-09 | End: 2022-12-14 | Stop reason: HOSPADM

## 2022-12-09 RX ORDER — ONDANSETRON 2 MG/ML
8 INJECTION INTRAMUSCULAR; INTRAVENOUS
Status: COMPLETED | OUTPATIENT
Start: 2022-12-09 | End: 2022-12-09

## 2022-12-09 RX ORDER — PANTOPRAZOLE SODIUM 40 MG/1
40 TABLET, DELAYED RELEASE ORAL DAILY
Status: DISCONTINUED | OUTPATIENT
Start: 2022-12-09 | End: 2022-12-10

## 2022-12-09 RX ORDER — ONDANSETRON 2 MG/ML
4 INJECTION INTRAMUSCULAR; INTRAVENOUS EVERY 6 HOURS PRN
Status: DISCONTINUED | OUTPATIENT
Start: 2022-12-09 | End: 2022-12-14 | Stop reason: HOSPADM

## 2022-12-09 RX ORDER — HYDROMORPHONE HYDROCHLORIDE 2 MG/ML
1 INJECTION, SOLUTION INTRAMUSCULAR; INTRAVENOUS; SUBCUTANEOUS
Status: COMPLETED | OUTPATIENT
Start: 2022-12-09 | End: 2022-12-09

## 2022-12-09 RX ORDER — MIRTAZAPINE 15 MG/1
15 TABLET, FILM COATED ORAL NIGHTLY
Status: DISCONTINUED | OUTPATIENT
Start: 2022-12-09 | End: 2022-12-14 | Stop reason: HOSPADM

## 2022-12-09 RX ADMIN — CLONIDINE 1 PATCH: 0.2 PATCH TRANSDERMAL at 04:12

## 2022-12-09 RX ADMIN — DROPERIDOL 2.5 MG: 2.5 INJECTION, SOLUTION INTRAMUSCULAR; INTRAVENOUS at 09:12

## 2022-12-09 RX ADMIN — SODIUM CHLORIDE 3 UNITS/HR: 9 INJECTION, SOLUTION INTRAVENOUS at 02:12

## 2022-12-09 RX ADMIN — SODIUM CHLORIDE: 9 INJECTION, SOLUTION INTRAVENOUS at 06:12

## 2022-12-09 RX ADMIN — ONDANSETRON 8 MG: 2 INJECTION INTRAMUSCULAR; INTRAVENOUS at 02:12

## 2022-12-09 RX ADMIN — LABETALOL HYDROCHLORIDE 10 MG: 5 INJECTION INTRAVENOUS at 11:12

## 2022-12-09 RX ADMIN — INSULIN HUMAN 10 UNITS: 100 INJECTION, SOLUTION PARENTERAL at 04:12

## 2022-12-09 RX ADMIN — DIPHENHYDRAMINE HYDROCHLORIDE 12.5 MG: 50 INJECTION, SOLUTION INTRAMUSCULAR; INTRAVENOUS at 09:12

## 2022-12-09 RX ADMIN — LEVETIRACETAM 500 MG: 100 INJECTION, SOLUTION INTRAVENOUS at 08:12

## 2022-12-09 RX ADMIN — PROMETHAZINE HYDROCHLORIDE 12.5 MG: 25 INJECTION INTRAMUSCULAR; INTRAVENOUS at 04:12

## 2022-12-09 RX ADMIN — SODIUM CHLORIDE 2 UNITS/HR: 9 INJECTION, SOLUTION INTRAVENOUS at 06:12

## 2022-12-09 RX ADMIN — LORAZEPAM 2 MG: 2 INJECTION INTRAMUSCULAR; INTRAVENOUS at 08:12

## 2022-12-09 RX ADMIN — SODIUM CHLORIDE 1000 ML: 0.9 INJECTION, SOLUTION INTRAVENOUS at 11:12

## 2022-12-09 RX ADMIN — TRAMADOL HYDROCHLORIDE 50 MG: 50 TABLET, COATED ORAL at 04:12

## 2022-12-09 RX ADMIN — LABETALOL HYDROCHLORIDE 10 MG: 5 INJECTION INTRAVENOUS at 02:12

## 2022-12-09 RX ADMIN — LABETALOL HYDROCHLORIDE 20 MG: 5 INJECTION, SOLUTION INTRAVENOUS at 04:12

## 2022-12-09 RX ADMIN — HYDRALAZINE HYDROCHLORIDE 20 MG: 20 INJECTION INTRAMUSCULAR; INTRAVENOUS at 08:12

## 2022-12-09 RX ADMIN — HYDROMORPHONE HYDROCHLORIDE 1 MG: 2 INJECTION INTRAMUSCULAR; INTRAVENOUS; SUBCUTANEOUS at 12:12

## 2022-12-09 RX ADMIN — SODIUM CHLORIDE 1000 ML: 0.9 INJECTION, SOLUTION INTRAVENOUS at 09:12

## 2022-12-09 NOTE — SUBJECTIVE & OBJECTIVE
Past Medical History:   Diagnosis Date    CKD (chronic kidney disease), stage IV 2022    Diabetes mellitus due to underlying condition with unspecified complications 2022    Gastroparesis 2022    Heart failure with preserved ejection fraction 2022    EF 55% on 3/22    History of gastroesophageal reflux (GERD)     History of supraventricular tachycardia     Hyperkalemia 2022    Hypertensive emergency 2022    Sickle cell trait 2022    Type 2 diabetes mellitus        Past Surgical History:   Procedure Laterality Date     SECTION      x 3    COLONOSCOPY      COLONOSCOPY N/A 2022    Procedure: COLONOSCOPY;  Surgeon: Jagdeep Cedeno MD;  Location: [facility];  Service: Endoscopy;  Laterality: N/A;    ESOPHAGOGASTRODUODENOSCOPY N/A 10/18/2019    Procedure: ESOPHAGOGASTRODUODENOSCOPY (EGD);  Surgeon: Gianluca Mendez MD;  Location: [facility];  Service: Endoscopy;  Laterality: N/A;    ESOPHAGOGASTRODUODENOSCOPY N/A 2022    Procedure: EGD (ESOPHAGOGASTRODUODENOSCOPY);  Surgeon: Micky Paredes III, MD;  Location: [facility];  Service: Endoscopy;  Laterality: N/A;    ESOPHAGOGASTRODUODENOSCOPY N/A 2022    Procedure: EGD (ESOPHAGOGASTRODUODENOSCOPY);  Surgeon: Marcelo Zhong MD;  Location: [facility];  Service: Endoscopy;  Laterality: N/A;    LAPAROSCOPIC CHOLECYSTECTOMY N/A 2022    Procedure: CHOLECYSTECTOMY, LAPAROSCOPIC;  Surgeon: Grey Perez MD;  Location: [facility];  Service: General;  Laterality: N/A;    PLACEMENT OF DUAL-LUMEN VASCULAR CATHETER Left 2022    Procedure: INSERTION-CATHETER-JOSEPH;  Surgeon: Dionte Gan MD;  Location: [facility] [place] [facility];  Service: General;  Laterality: Left;    PLACEMENT OF DUAL-LUMEN VASCULAR CATHETER Right 2022    Procedure: INSERTION-CATHETER-Hemosplit;  Surgeon: Dionte Gan MD;  Location: [facility] [place] [facility];  Service: General;  Laterality: Right;       Review of patient's allergies indicates:   Allergen Reactions     Penicillins Hives       No current facility-administered medications on file prior to encounter.     Current Outpatient Medications on File Prior to Encounter   Medication Sig    albuterol (PROVENTIL/VENTOLIN HFA) 90 mcg/actuation inhaler Inhale 2 puffs into the lungs every 6 (six) hours as needed for Wheezing. Rescue    amLODIPine (NORVASC) 10 MG tablet Take 1 tablet (10 mg total) by mouth once daily.    apixaban (ELIQUIS) 5 mg Tab Take 1 tablet (5 mg total) by mouth 2 (two) times daily.    carvediloL (COREG) 25 MG tablet Take 1 tablet (25 mg total) by mouth 2 (two) times daily.    cloNIDine 0.2 mg/24 hr td ptwk (CATAPRES) 0.2 mg/24 hr Place 1 patch onto the skin every 7 days.    dicyclomine (BENTYL) 10 MG capsule Take 1 capsule (10 mg total) by mouth every 8 (eight) hours as needed (abdominal pain).    FLUoxetine 20 MG capsule Take 1 capsule (20 mg total) by mouth once daily.    gabapentin (NEURONTIN) 100 MG capsule Take 1 capsule (100 mg total) by mouth 2 (two) times daily.    hydrALAZINE (APRESOLINE) 100 MG tablet Take 1 tablet (100 mg total) by mouth every 8 (eight) hours.    insulin aspart U-100 (NOVOLOG) 100 unit/mL (3 mL) InPn pen Inject 1-10 Units into the skin before meals and at bedtime as needed (Hyperglycemia). Max daily dose 40 units.    insulin detemir U-100 (LEVEMIR FLEXTOUCH) 100 unit/mL (3 mL) SubQ InPn pen Inject 9 Units into the skin once daily.    isosorbide mononitrate (IMDUR) 60 MG 24 hr tablet Take 2 tablets (120 mg total) by mouth once daily.    levETIRAcetam (KEPPRA) 500 MG Tab Take 1 tablet (500 mg total) by mouth 2 (two) times daily.    methocarbamoL (ROBAXIN) 500 MG Tab Take 1 tablet (500 mg total) by mouth 3 (three) times daily.    mirtazapine (REMERON SOL-TAB) 15 MG disintegrating tablet Take 1 tablet (15 mg total) by mouth nightly.    ondansetron (ZOFRAN) 4 MG tablet Take 1 tablet (4 mg total) by mouth every 8 (eight) hours as needed for Nausea.    pantoprazole (PROTONIX) 40 MG tablet  "Take 40 mg by mouth once daily.    pen needle, diabetic (BD ULTRA-FINE MAGALIE PEN NEEDLE) 32 gauge x 5/32" Ndle Inject into the skin 5 times per day with insulin    promethazine (PHENERGAN) 12.5 MG Tab Take 1 tablet (12.5 mg total) by mouth every 12 (twelve) hours as needed (nausea/vomiting not relieved with ondansetron (zofran)).    traMADoL (ULTRAM) 50 mg tablet Take 1 tablet (50 mg total) by mouth every 8 (eight) hours as needed for Pain.    [DISCONTINUED] atenoloL (TENORMIN) 50 MG tablet Take 1 tablet (50 mg total) by mouth every other day.    [DISCONTINUED] omeprazole (PRILOSEC) 20 MG capsule Take 2 capsules (40 mg total) by mouth once daily. for 10 days    [DISCONTINUED] torsemide (DEMADEX) 20 MG Tab Take 1 tablet (20 mg total) by mouth 2 (two) times a day. (Patient taking differently: Take 20 mg by mouth once daily.)     Family History       Problem Relation (Age of Onset)    Diabetes Mother, Father          Tobacco Use    Smoking status: Never    Smokeless tobacco: Never   Substance and Sexual Activity    Alcohol use: Not Currently    Drug use: No    Sexual activity: Not Currently     Partners: Male     Birth control/protection: I.U.D.     Review of Systems   Constitutional:  Positive for fatigue. Negative for appetite change, chills and fever.   HENT:  Negative for congestion and rhinorrhea.    Eyes:  Negative for visual disturbance.   Respiratory:  Negative for cough and shortness of breath.    Cardiovascular:  Positive for chest pain. Negative for leg swelling.   Gastrointestinal:  Positive for abdominal pain, nausea and vomiting. Negative for constipation and diarrhea.   Genitourinary:  Positive for decreased urine volume (chronic). Negative for difficulty urinating and dysuria.   Musculoskeletal:  Positive for back pain. Negative for arthralgias and myalgias.   Skin:  Negative for rash.   Neurological:  Negative for dizziness, weakness, numbness and headaches.   Psychiatric/Behavioral:  Negative for " confusion.    All other systems reviewed and are negative.  Objective:     Vital Signs (Most Recent):  Temp: 97.5 °F (36.4 °C) (12/09/22 0855)  Pulse: 88 (12/09/22 1435)  Resp: 20 (12/09/22 1257)  BP: (!) 210/119 (12/09/22 1438)  SpO2: 95 % (12/09/22 1435)   Vital Signs (24h Range):  Temp:  [97.5 °F (36.4 °C)] 97.5 °F (36.4 °C)  Pulse:  [] 88  Resp:  [20-22] 20  SpO2:  [95 %-100 %] 95 %  BP: (184-213)/(110-122) 210/119     Weight: 60 kg (132 lb 4.4 oz)  Body mass index is 24.19 kg/m².    Physical Exam  Vitals reviewed.   Constitutional:       General: She is in acute distress (fetal position in bed crying).      Appearance: She is not ill-appearing.   HENT:      Head: Normocephalic and atraumatic.      Right Ear: External ear normal.      Left Ear: External ear normal.      Nose: Nose normal.      Mouth/Throat:      Mouth: Mucous membranes are moist.      Pharynx: Oropharynx is clear.   Eyes:      General:         Right eye: No discharge.         Left eye: No discharge.      Conjunctiva/sclera: Conjunctivae normal.   Cardiovascular:      Rate and Rhythm: Normal rate and regular rhythm.      Pulses: Normal pulses.      Heart sounds: Normal heart sounds. No murmur heard.  Pulmonary:      Effort: Pulmonary effort is normal. No respiratory distress.      Breath sounds: Normal breath sounds. No wheezing, rhonchi or rales.   Abdominal:      General: Abdomen is flat. Bowel sounds are normal. There is no distension.      Palpations: Abdomen is soft.      Tenderness: There is generalized abdominal tenderness (pain only minimally exacerbated by palpation). There is no guarding or rebound.   Musculoskeletal:         General: No swelling, deformity or signs of injury.      Cervical back: Neck supple. No rigidity.   Skin:     General: Skin is warm and dry.      Findings: No rash.   Neurological:      General: No focal deficit present.      Mental Status: She is alert and oriented to person, place, and time.   Psychiatric:          Mood and Affect: Affect normal.         Behavior: Behavior normal.         Thought Content: Thought content normal.           Significant Labs: All pertinent labs within the past 24 hours have been reviewed.    Significant Imaging: I have reviewed all pertinent imaging results/findings within the past 24 hours.

## 2022-12-09 NOTE — H&P
[facility]  History & Physical    Patient Name: Tabby Howard  MRN: [id number]  Patient Class: IP- Inpatient  Admission Date: 2022  Attending Physician: Max Garcia MD  Primary Care Provider: [facility]         Patient information was obtained from patient, past medical records and ER records.     Subjective:     Principal Problem:Diabetic ketoacidosis without coma associated with type 2 diabetes mellitus    Chief Complaint:   Chief Complaint   Patient presents with    Chest Pain     Atraumatic, s/s since last PM since last PM--in assoc with N/V & cough; pt is a hemodialysis pt (dialyzed yesterday)        HPI: 32F with PMH HTN, IDDM, ESRD on HD, HFpEF, seizure disorder, adjustment disorder, GERD, DVT, and idiopathic abdominal pain presents to the ER due to severe abdominal pain. Associated symptoms include fatigue, nausea, vomiting, chest pain, and back pain. She denies SOB. She reports compliance with her scheduled dialysis with last session yesterday . She reports adherence to her insulin regimen. Of note, she had a recent hospital stay - for abdominal pain, nausea, and vomiting for which no clear etiology of her pain was identified. Found to have lab findings consistent with DKA in ER in addition to her intractable pain. Admitted to hospital medicine for further management.      Past Medical History:   Diagnosis Date    CKD (chronic kidney disease), stage IV 2022    Diabetes mellitus due to underlying condition with unspecified complications 2022    Gastroparesis 2022    Heart failure with preserved ejection fraction 2022    EF 55% on 3/22    History of gastroesophageal reflux (GERD)     History of supraventricular tachycardia     Hyperkalemia 2022    Hypertensive emergency 2022    Sickle cell trait 2022    Type 2 diabetes mellitus        Past Surgical History:   Procedure Laterality Date      SECTION      x 3    COLONOSCOPY      COLONOSCOPY N/A 8/25/2022    Procedure: COLONOSCOPY;  Surgeon: Jagdeep Cedeno MD;  Location: [facility] ENDO;  Service: Endoscopy;  Laterality: N/A;    ESOPHAGOGASTRODUODENOSCOPY N/A 10/18/2019    Procedure: ESOPHAGOGASTRODUODENOSCOPY (EGD);  Surgeon: Gianluca Mendez MD;  [facility] ENDO;  Service: Endoscopy;  Laterality: N/A;    ESOPHAGOGASTRODUODENOSCOPY N/A 08/24/2022    Procedure: EGD (ESOPHAGOGASTRODUODENOSCOPY);  Surgeon: Micky Paredes III, MD;  Location: [facility] ENDO;  Service: Endoscopy;  Laterality: N/A;    ESOPHAGOGASTRODUODENOSCOPY N/A 12/5/2022    Procedure: EGD (ESOPHAGOGASTRODUODENOSCOPY);  Surgeon: Marcelo Zhong MD;  Location: [facility];  Service: Endoscopy;  Laterality: N/A;    LAPAROSCOPIC CHOLECYSTECTOMY N/A 07/30/2022    Procedure: CHOLECYSTECTOMY, LAPAROSCOPIC;  Surgeon: Grey Perez MD;  Location: [facility] OR;  Service: General;  Laterality: N/A;    PLACEMENT OF DUAL-LUMEN VASCULAR CATHETER Left 07/12/2022    Procedure: INSERTION-CATHETER-JOSEPH;  Surgeon: Dionte Gan MD;  Location: [facility] OR;  Service: General;  Laterality: Left;    PLACEMENT OF DUAL-LUMEN VASCULAR CATHETER Right 07/26/2022    Procedure: INSERTION-CATHETER-Hemosplit;  Surgeon: Dionte Gan MD;  Location: [facility];  Service: General;  Laterality: Right;       Review of patient's allergies indicates:   Allergen Reactions    Penicillins Hives       No current facility-administered medications on file prior to encounter.     Current Outpatient Medications on File Prior to Encounter   Medication Sig    albuterol (PROVENTIL/VENTOLIN HFA) 90 mcg/actuation inhaler Inhale 2 puffs into the lungs every 6 (six) hours as needed for Wheezing. Rescue    amLODIPine (NORVASC) 10 MG tablet Take 1 tablet (10 mg total) by mouth once daily.    apixaban (ELIQUIS) 5 mg Tab Take 1 tablet (5 mg total) by mouth 2 (two) times daily.    carvediloL (COREG) 25 MG tablet Take 1 tablet (25 mg total) by mouth 2  "(two) times daily.    cloNIDine 0.2 mg/24 hr td ptwk (CATAPRES) 0.2 mg/24 hr Place 1 patch onto the skin every 7 days.    dicyclomine (BENTYL) 10 MG capsule Take 1 capsule (10 mg total) by mouth every 8 (eight) hours as needed (abdominal pain).    FLUoxetine 20 MG capsule Take 1 capsule (20 mg total) by mouth once daily.    gabapentin (NEURONTIN) 100 MG capsule Take 1 capsule (100 mg total) by mouth 2 (two) times daily.    hydrALAZINE (APRESOLINE) 100 MG tablet Take 1 tablet (100 mg total) by mouth every 8 (eight) hours.    insulin aspart U-100 (NOVOLOG) 100 unit/mL (3 mL) InPn pen Inject 1-10 Units into the skin before meals and at bedtime as needed (Hyperglycemia). Max daily dose 40 units.    insulin detemir U-100 (LEVEMIR FLEXTOUCH) 100 unit/mL (3 mL) SubQ InPn pen Inject 9 Units into the skin once daily.    isosorbide mononitrate (IMDUR) 60 MG 24 hr tablet Take 2 tablets (120 mg total) by mouth once daily.    levETIRAcetam (KEPPRA) 500 MG Tab Take 1 tablet (500 mg total) by mouth 2 (two) times daily.    methocarbamoL (ROBAXIN) 500 MG Tab Take 1 tablet (500 mg total) by mouth 3 (three) times daily.    mirtazapine (REMERON SOL-TAB) 15 MG disintegrating tablet Take 1 tablet (15 mg total) by mouth nightly.    ondansetron (ZOFRAN) 4 MG tablet Take 1 tablet (4 mg total) by mouth every 8 (eight) hours as needed for Nausea.    pantoprazole (PROTONIX) 40 MG tablet Take 40 mg by mouth once daily.    pen needle, diabetic (BD ULTRA-FINE MAGALIE PEN NEEDLE) 32 gauge x 5/32" Ndle Inject into the skin 5 times per day with insulin    promethazine (PHENERGAN) 12.5 MG Tab Take 1 tablet (12.5 mg total) by mouth every 12 (twelve) hours as needed (nausea/vomiting not relieved with ondansetron (zofran)).    traMADoL (ULTRAM) 50 mg tablet Take 1 tablet (50 mg total) by mouth every 8 (eight) hours as needed for Pain.    [DISCONTINUED] atenoloL (TENORMIN) 50 MG tablet Take 1 tablet (50 mg total) by mouth every other day.    [DISCONTINUED] " omeprazole (PRILOSEC) 20 MG capsule Take 2 capsules (40 mg total) by mouth once daily. for 10 days    [DISCONTINUED] torsemide (DEMADEX) 20 MG Tab Take 1 tablet (20 mg total) by mouth 2 (two) times a day. (Patient taking differently: Take 20 mg by mouth once daily.)     Family History       Problem Relation (Age of Onset)    Diabetes Mother, Father          Tobacco Use    Smoking status: Never    Smokeless tobacco: Never   Substance and Sexual Activity    Alcohol use: Not Currently    Drug use: No    Sexual activity: Not Currently     Partners: Male     Birth control/protection: I.U.D.     Review of Systems   Constitutional:  Positive for fatigue. Negative for appetite change, chills and fever.   HENT:  Negative for congestion and rhinorrhea.    Eyes:  Negative for visual disturbance.   Respiratory:  Negative for cough and shortness of breath.    Cardiovascular:  Positive for chest pain. Negative for leg swelling.   Gastrointestinal:  Positive for abdominal pain, nausea and vomiting. Negative for constipation and diarrhea.   Genitourinary:  Positive for decreased urine volume (chronic). Negative for difficulty urinating and dysuria.   Musculoskeletal:  Positive for back pain. Negative for arthralgias and myalgias.   Skin:  Negative for rash.   Neurological:  Negative for dizziness, weakness, numbness and headaches.   Psychiatric/Behavioral:  Negative for confusion.    All other systems reviewed and are negative.  Objective:     Vital Signs (Most Recent):  Temp: 97.5 °F (36.4 °C) (12/09/22 0855)  Pulse: 88 (12/09/22 1435)  Resp: 20 (12/09/22 1257)  BP: (!) 210/119 (12/09/22 1438)  SpO2: 95 % (12/09/22 1435)   Vital Signs (24h Range):  Temp:  [97.5 °F (36.4 °C)] 97.5 °F (36.4 °C)  Pulse:  [] 88  Resp:  [20-22] 20  SpO2:  [95 %-100 %] 95 %  BP: (184-213)/(110-122) 210/119     Weight: 60 kg (132 lb 4.4 oz)  Body mass index is 24.19 kg/m².    Physical Exam  Vitals reviewed.   Constitutional:       General: She is  in acute distress (fetal position in bed crying).      Appearance: She is not ill-appearing.   HENT:      Head: Normocephalic and atraumatic.      Right Ear: External ear normal.      Left Ear: External ear normal.      Nose: Nose normal.      Mouth/Throat:      Mouth: Mucous membranes are moist.      Pharynx: Oropharynx is clear.   Eyes:      General:         Right eye: No discharge.         Left eye: No discharge.      Conjunctiva/sclera: Conjunctivae normal.   Cardiovascular:      Rate and Rhythm: Normal rate and regular rhythm.      Pulses: Normal pulses.      Heart sounds: Normal heart sounds. No murmur heard.  Pulmonary:      Effort: Pulmonary effort is normal. No respiratory distress.      Breath sounds: Normal breath sounds. No wheezing, rhonchi or rales.   Abdominal:      General: Abdomen is flat. Bowel sounds are normal. There is no distension.      Palpations: Abdomen is soft.      Tenderness: There is generalized abdominal tenderness (pain only minimally exacerbated by palpation). There is no guarding or rebound.   Musculoskeletal:         General: No swelling, deformity or signs of injury.      Cervical back: Neck supple. No rigidity.   Skin:     General: Skin is warm and dry.      Findings: No rash.   Neurological:      General: No focal deficit present.      Mental Status: She is alert and oriented to person, place, and time.   Psychiatric:         Mood and Affect: Affect normal.         Behavior: Behavior normal.         Thought Content: Thought content normal.           Significant Labs: All pertinent labs within the past 24 hours have been reviewed.    Significant Imaging: I have reviewed all pertinent imaging results/findings within the past 24 hours.    Assessment/Plan:     * Diabetic ketoacidosis without coma associated with type 2 diabetes mellitus  Noted elevated anion gap, hyperglycemia, and serum ketones  S/p 2L IVFH in ER, will hold further IVFH due to tenuous volume status with hx HFpEF  and ESRD on HD  To start insulin gtt and follow protocol for titration  Monitor BMP q4, glucose q1  NPO for now    Intractable generalized abdominal pain  Acute on chronic  Has had multiple admissions for this without clear etiology  Likely some aspect of gastritis, gastroparesis, and/or other dysmotility from chronic conditions  There has been prior concern for pain med seeking  Trop neg x1, will repeat one more  Scheduled bentyl  Pain meds prn, antiemetics prn    Adjustment disorder  Recently assessed by psychiatry MD consult last admission  Continue home fluoxetine and mirtazapine    Anemia of chronic kidney failure, stage 5  Patient's anemia is currently controlled. Has not received any PRBCs to date this admission. Etiology likely d/t chronic disease.  Current CBC reviewed-   Lab Results   Component Value Date    HGB 10.5 (L) 12/09/2022    HCT 30.5 (L) 12/09/2022     Monitor serial CBC and transfuse if patient becomes hemodynamically unstable, symptomatic or H/H drops below 7/21.     Thrombocytopenia  Noted, chronic  No sign of bleeding  Continue to monitor and transfuse if needed    Hypertension  Chronic, uncontrolled currently  Continue home meds amlodipine, coreg, hydralazine, clonidine patch  Labetalol prn  Adjust regimen as needed    Deep vein thrombosis (DVT) of non-extremity vein  Noted venous LUE 7/24/22: Partially occlusive thrombus within the left internal jugular and cephalic veins.  Continue home eliquis     ESRD (end stage renal disease) on dialysis  Chronic  Consult to nephro to manage her scheduled dialysis    Seizure disorder  Chronic, controlled  Continue home keppra    Type 2 diabetes mellitus with chronic kidney disease on chronic dialysis, with long-term current use of insulin  Patient's FSGs are uncontrolled due to hyperglycemia on current medication regimen.  Last A1c reviewed-   Lab Results   Component Value Date    HGBA1C 6.2 08/24/2022     Most recent fingerstick glucose reviewed-    Recent Labs   Lab 12/09/22 0927 12/09/22  0929 12/09/22  1418 12/09/22  1423   POCTGLUCOSE >500* >500* >500* >500*     Current correctional scale  on insulin gtt  Maintain anti-hyperglycemic dose as follows-   Antihyperglycemics (From admission, onward)      Start     Stop Route Frequency Ordered    12/09/22 1515  insulin regular 1 Units/mL in sodium chloride 0.9% 100 mL infusion        Question Answer Comment   Insulin Rate Adjustment (DO NOT MODIFY ANSWER) [email or web address]    Enter initial dose from Infusion Protocol Chart (Units/hr): 3        -- IV Continuous 12/09/22 1417          Hold Oral hypoglycemics while patient is in the hospital.  Adjust regimen for goal glucose <180    Gastritis  Noted, chronic  Had EGD 12/5 consistent with this, no ulcers or other severe findings  Continue ppi    VTE Risk Mitigation (From admission, onward)           Ordered     apixaban tablet 2.5 mg  2 times daily         12/09/22 1417     IP VTE HIGH RISK PATIENT  Once         12/09/22 1417                       Max Garcia MD  Department of Hospital Medicine   [facility]

## 2022-12-09 NOTE — ED PROVIDER NOTES
Encounter Date: 2022    SCRIBE #1 NOTE: I, Jacquelazaro Mendieta, am scribing for, and in the presence of,  Elieser Hoover MD.     History     Chief Complaint   Patient presents with    Chest Pain     Atraumatic, s/s since last PM since last PM--in assoc with N/V & cough; pt is a hemodialysis pt (dialyzed yesterday)     Time seen by provider: 9:27 AM on 2022    Tabby Howard is a 32 y.o. female with a PMHx of ESRD on HD, DM, HFpEF, GERD, suspected gastroparesis, sickle cell trait, HTN who presents to the ED with an onset of abdominal, back and chest pain with associated nausea and vomiting since last night. Patient was recently admitted for unrelenting abdominal pain with nausea and vomiting o . GI consulted and performed EGD which showed patchy areas of inflammation likely edema and gastritis. Patient was discharged home on . Patient states she feels worse than she did prior to her recent admission. She states she did not have any medication at home to take. Patient was last dialyzed yesterday. PSHx includes  and cholecystectomy.      The history is provided by the patient and medical records.   Review of patient's allergies indicates:   Allergen Reactions    Penicillins Hives     Past Medical History:   Diagnosis Date    CKD (chronic kidney disease), stage IV 2022    Diabetes mellitus due to underlying condition with unspecified complications 2022    Gastroparesis 2022    Heart failure with preserved ejection fraction 2022    EF 55% on 3/22    History of gastroesophageal reflux (GERD)     History of supraventricular tachycardia     Hyperkalemia 2022    Hypertensive emergency 2022    Sickle cell trait 2022    Type 2 diabetes mellitus      Past Surgical History:   Procedure Laterality Date     SECTION      x 3    COLONOSCOPY      COLONOSCOPY N/A 2022    Procedure: COLONOSCOPY;  Surgeon: Jagdeep Cedeno MD;  Location: [facility];  Service:  Endoscopy;  Laterality: N/A;    ESOPHAGOGASTRODUODENOSCOPY N/A 10/18/2019    Procedure: ESOPHAGOGASTRODUODENOSCOPY (EGD);  Surgeon: Gianluca Mendez MD;  Location: [facility];  Service: Endoscopy;  Laterality: N/A;    ESOPHAGOGASTRODUODENOSCOPY N/A 08/24/2022    Procedure: EGD (ESOPHAGOGASTRODUODENOSCOPY);  Surgeon: Micky Paredes III, MD;  Location: [facility] ENDO;  Service: Endoscopy;  Laterality: N/A;    ESOPHAGOGASTRODUODENOSCOPY N/A 12/5/2022    Procedure: EGD (ESOPHAGOGASTRODUODENOSCOPY);  Surgeon: Marcelo Zhong MD;  Location: [facility];  Service: Endoscopy;  Laterality: N/A;    LAPAROSCOPIC CHOLECYSTECTOMY N/A 07/30/2022    Procedure: CHOLECYSTECTOMY, LAPAROSCOPIC;  Surgeon: Grey Perez MD;  Location: [facility] OR;  Service: General;  Laterality: N/A;    PLACEMENT OF DUAL-LUMEN VASCULAR CATHETER Left 07/12/2022    Procedure: INSERTION-CATHETER-JOSEPH;  Surgeon: Dionte Gan MD;  Location: [facility] OR;  Service: General;  Laterality: Left;    PLACEMENT OF DUAL-LUMEN VASCULAR CATHETER Right 07/26/2022    Procedure: INSERTION-CATHETER-Hemosplit;  Surgeon: Dionte Gan MD;  Location: [facility] OR;  Service: General;  Laterality: Right;     Family History   Problem Relation Age of Onset    Diabetes Mother     Diabetes Father      Social History     Tobacco Use    Smoking status: Never    Smokeless tobacco: Never   Substance Use Topics    Alcohol use: Not Currently    Drug use: No     Review of Systems   Constitutional:  Negative for activity change, diaphoresis and fever.   HENT:  Negative for ear pain, rhinorrhea, sore throat and trouble swallowing.    Eyes:  Negative for pain and visual disturbance.   Respiratory:  Negative for cough, shortness of breath and stridor.    Cardiovascular:  Positive for chest pain.   Gastrointestinal:  Positive for abdominal pain, nausea and vomiting. Negative for blood in stool and diarrhea.   Genitourinary:  Negative for dysuria, hematuria, vaginal bleeding and vaginal discharge.    Musculoskeletal:  Positive for back pain. Negative for gait problem.   Skin:  Negative for rash and wound.   Neurological:  Negative for seizures and headaches.   Psychiatric/Behavioral:  Negative for hallucinations and suicidal ideas.      Physical Exam     Initial Vitals [12/09/22 0855]   BP Pulse Resp Temp SpO2   (!) 184/122 101 (!) 22 97.5 °F (36.4 °C) 100 %      MAP       --         Physical Exam    Nursing note and vitals reviewed.  Constitutional: She appears well-developed. She is not diaphoretic.   HENT:   Head: Normocephalic and atraumatic.   Nose: Nose normal.   Eyes: EOM are normal. No scleral icterus.   Neck: Neck supple.   Normal range of motion.  Cardiovascular:  Regular rhythm, normal heart sounds and intact distal pulses.   Tachycardia present.   Exam reveals no gallop and no friction rub.       No murmur heard.  Pulmonary/Chest: Breath sounds normal. No stridor. Tachypnea noted. No respiratory distress. She has no wheezes. She has no rhonchi. She has no rales.   Abdominal: Abdomen is soft. Bowel sounds are normal. She exhibits no distension. There is generalized abdominal tenderness.   No right CVA tenderness.  No left CVA tenderness. There is no rebound and no guarding.   Musculoskeletal:         General: Normal range of motion.      Cervical back: Normal range of motion and neck supple.     Neurological: She is alert and oriented to person, place, and time. She has normal strength. No cranial nerve deficit or sensory deficit.   Skin: Skin is warm and dry. Capillary refill takes less than 2 seconds. No rash noted.   Psychiatric: She has a normal mood and affect.       ED Course   Procedures  Labs Reviewed   CBC W/ AUTO DIFFERENTIAL - Abnormal; Notable for the following components:       Result Value    RBC 3.75 (*)     Hemoglobin 10.5 (*)     Hematocrit 30.5 (*)     MCV 81 (*)     RDW 16.1 (*)     Platelets 63 (*)     Lymph # 0.7 (*)     Gran % 82.9 (*)     Lymph % 9.0 (*)     All other  components within normal limits   COMPREHENSIVE METABOLIC PANEL - Abnormal; Notable for the following components:    Sodium 128 (*)     Chloride 90 (*)     CO2 21 (*)     Glucose 645 (*)     BUN 42 (*)     Creatinine 3.4 (*)     Total Bilirubin 3.3 (*)     Alkaline Phosphatase 377 (*)     Anion Gap 17 (*)     eGFR 18 (*)     All other components within normal limits    Narrative:      glucose  critical result(s) called and verbal readback obtained from   Zamzam Hoskins by CLG 12/09/2022 10:36   BETA - HYDROXYBUTYRATE, SERUM - Abnormal; Notable for the following components:    Beta-Hydroxybutyrate 3.8 (*)     All other components within normal limits   POCT GLUCOSE - Abnormal; Notable for the following components:    POCT Glucose >500 (*)     All other components within normal limits   POCT GLUCOSE - Abnormal; Notable for the following components:    POCT Glucose >500 (*)     All other components within normal limits   ISTAT PROCEDURE - Abnormal; Notable for the following components:    POC PH 7.464 (*)     POC SATURATED O2 81 (*)     All other components within normal limits   POCT GLUCOSE - Abnormal; Notable for the following components:    POCT Glucose >500 (*)     All other components within normal limits   POCT GLUCOSE - Abnormal; Notable for the following components:    POCT Glucose >500 (*)     All other components within normal limits   LIPASE   HCG, QUANTITATIVE    Narrative:     Release to patient->Immediate   URINALYSIS, REFLEX TO URINE CULTURE   BASIC METABOLIC PANEL   POCT GLUCOSE MONITORING CONTINUOUS        ECG Results              EKG 12-lead (In process)  Result time 12/09/22 09:12:04      In process by Interface, Lab In [facility] (12/09/22 09:12:04)                   Narrative:    Test Reason : R07.89,    Vent. Rate : 099 BPM     Atrial Rate : 099 BPM     P-R Int : 176 ms          QRS Dur : 090 ms      QT Int : 402 ms       P-R-T Axes : 082 -55 039 degrees     QTc Int : 515 ms    Normal sinus  rhythm  Possible Left atrial enlargement  Left axis deviation  Prolonged QT  Abnormal ECG  When compared with ECG of 06-DEC-2022 23:01,  No significant change was found    Referred By: [email or web address]   SELF           Confirmed By:                                   Imaging Results              CT Abdomen Pelvis  Without Contrast (Final result)  Result time 12/09/22 12:49:48      Final result by Gianluca Arriaga Jr., MD (12/09/22 12:49:48)                   Impression:      Continued prominent pericardial effusion.  Anasarca of the abdomen and pelvis.  Prior cholecystectomy.      Electronically signed by: Gianluca Arriaga MD  Date:    12/09/2022  Time:    12:49               Narrative:    EXAMINATION:  CT ABDOMEN PELVIS WITHOUT CONTRAST    CLINICAL HISTORY:  Abdominal pain, acute, nonlocalized;    TECHNIQUE:  Low dose axial images, sagittal and coronal reformations were obtained from the lung bases to the pubic symphysis.    COMPARISON:  CT abdomen of November 19, 2022 and October 26, 2022.    FINDINGS:  There remains a prominent pericardial effusion not markedly changed from the prior studies.  A pleural effusion is not seen.    The liver is of normal size contour and CT density without focal defect.  The gallbladder is absent with surgical clips noted.  The pancreas appears of normal contour and CT density without edema or mass.  The spleen is of normal size and CT density.    The adrenal glands are not enlarged.  The kidneys are of normal size contour and CT density without mass stone or hydronephrosis.  The abdominal aorta and inferior vena cava are of normal caliber.    There is diffuse anasarca throughout the abdomen and pelvis.  Ascites is not identified.  The stomach is of normal configuration.  Small bowel dilatation or air-fluid levels are not seen.  No free fluid or free air is identified.  The colon appears of normal configuration without distention or mass.    The bladder is of normal contour without mass or  asymmetry.  The uterus is not enlarged.                                       Medications   epoetin teresa injection 3,000 Units (has no administration in time range)   amLODIPine tablet 10 mg (10 mg Oral Not Given 12/9/22 1515)   apixaban tablet 2.5 mg (has no administration in time range)   carvediloL tablet 25 mg (has no administration in time range)   dicyclomine capsule 10 mg (10 mg Oral Not Given 12/9/22 1515)   FLUoxetine capsule 20 mg (has no administration in time range)   gabapentin capsule 100 mg (has no administration in time range)   hydrALAZINE tablet 100 mg (has no administration in time range)   isosorbide mononitrate 24 hr tablet 120 mg (120 mg Oral Not Given 12/9/22 1515)   levETIRAcetam tablet 500 mg (has no administration in time range)   mirtazapine tablet 15 mg (has no administration in time range)   pantoprazole EC tablet 40 mg (40 mg Oral Not Given 12/9/22 1515)   promethazine (PHENERGAN) 12.5 mg in dextrose 5 % 50 mL IVPB (has no administration in time range)   torsemide tablet 20 mg (20 mg Oral Not Given 12/9/22 1430)   labetaloL injection 10 mg (10 mg Intravenous Given 12/9/22 1428)   sodium chloride 0.9% flush 10 mL (has no administration in time range)   ondansetron injection 4 mg (has no administration in time range)   acetaminophen tablet 650 mg (has no administration in time range)   dextrose 10% bolus 125 mL (has no administration in time range)   dextrose 10% bolus 250 mL (has no administration in time range)   dextrose 5 % and 0.9 % NaCl infusion (has no administration in time range)   traMADoL tablet 50 mg (has no administration in time range)   HYDROmorphone (PF) injection 0.5 mg (has no administration in time range)   insulin regular 1 Units/mL in sodium chloride 0.9% 100 mL infusion (has no administration in time range)   droperidoL injection 2.5 mg (2.5 mg Intravenous Given 12/9/22 0953)   diphenhydrAMINE injection 12.5 mg (12.5 mg Intravenous Given 12/9/22 0953)   sodium  chloride 0.9% bolus 1,000 mL (0 mLs Intravenous Stopped 12/9/22 1113)   sodium chloride 0.9% bolus 1,000 mL (0 mLs Intravenous Stopped 12/9/22 1200)   HYDROmorphone (PF) injection 1 mg (1 mg Intravenous Given 12/9/22 1257)   ondansetron injection 8 mg (8 mg Intravenous Given 12/9/22 1414)     Medical Decision Making:   History:   Old Medical Records: I decided to obtain old medical records.  Independently Interpreted Test(s):   I have ordered and independently interpreted EKG Reading(s) - see prior notes  Clinical Tests:   Lab Tests: Ordered and Reviewed  Radiological Study: Ordered and Reviewed  Medical Tests: Ordered and Reviewed  ED Management:  32 year old female with ESRD on HD, DM, HFpEF, GERD, suspected gastroparesis, sickle cell trait, HTN, presents with intractable nausea vomiting diarrhea setting of hyperglycemia with borderline DKA.  Given IV fluids will admit to Medicine for further management.        Scribe Attestation:   Scribe #1: I performed the above scribed service and the documentation accurately describes the services I performed. I attest to the accuracy of the note.      ED Course as of 12/09/22 1452   Fri Dec 09, 2022   0926  [BD]   1324 CT Abdomen Pelvis  Without Contrast [BD]   1325 Impression:     Continued prominent pericardial effusion.  Anasarca of the abdomen and pelvis.  Prior cholecystectomy.        Electronically signed by: Gianluca Arriaga MD  Date:                                            12/09/2022  Time:                                           12:49 [BD]      ED Course User Index  [BD] Elieser Hoover MD            Attending Attestation:     Physician Attestation for Scribe:    I, Dr. Elieser Hoover, personally performed the services described in this documentation.   All medical record entries made by the scribe were at my direction and in my presence.   I have reviewed the chart and agree that the record is accurate and complete.   Elieser Hoover MD  2:55 PM 12/09/2022      DISCLAIMER: This note was prepared with [employer] voice recognition transcription software. Garbled syntax, mangled pronouns, and other bizarre constructions may be attributed to that software system.         Clinical Impression:   Final diagnoses:  [R07.89] Chest discomfort  [R73.9] Hyperglycemia (Primary)  [R10.84] Generalized abdominal pain  [E11.10] DKA (diabetic ketoacidosis)        ED Disposition Condition    Admit                 Elieser Hoover MD  12/09/22 1105

## 2022-12-09 NOTE — NURSING
Pt still moaning and c/o pain and nausea, prn medicine given by primary nurse. Insulin drip infusing. Updated again pt with pain medicine time.

## 2022-12-09 NOTE — CONSULTS
Nephrology Consult Note        Patient Name: Tabby Howard  MRN: [id number]    Patient Class: IP- Inpatient   Admission Date: 2022  Length of Stay: 0 days  Date of Service: 2022    Attending Physician: Max Garcia MD  Primary Care Provider: [facility]    Reason for Consult: esrd/anemia/htn/shpt    SUBJECTIVE:     HPI: 33 y/o female patient known to our practice, on dialysis 3x wk on TTS schedule, had a recent hospital stay - for abdominal pain, nausea, and vomiting, was found to have DKA in ER in addition to her intractable pain and admitted for management. Nephrology is consulted for comanagement.    Past Medical History:   Diagnosis Date    CKD (chronic kidney disease), stage IV 2022    Diabetes mellitus due to underlying condition with unspecified complications 2022    Gastroparesis 2022    Heart failure with preserved ejection fraction 2022    EF 55% on 3/22    History of gastroesophageal reflux (GERD)     History of supraventricular tachycardia     Hyperkalemia 2022    Hypertensive emergency 2022    Sickle cell trait 2022    Type 2 diabetes mellitus      Past Surgical History:   Procedure Laterality Date     SECTION      x 3    COLONOSCOPY      COLONOSCOPY N/A 2022    Procedure: COLONOSCOPY;  Surgeon: Jagdeep Cedeno MD;  Location: [facility];  Service: Endoscopy;  Laterality: N/A;    ESOPHAGOGASTRODUODENOSCOPY N/A 10/18/2019    Procedure: ESOPHAGOGASTRODUODENOSCOPY (EGD);  Surgeon: Gianluca eMndez MD;  Location: [facility];  Service: Endoscopy;  Laterality: N/A;    ESOPHAGOGASTRODUODENOSCOPY N/A 2022    Procedure: EGD (ESOPHAGOGASTRODUODENOSCOPY);  Surgeon: Micky Paredes III, MD;  Location: [facility];  Service: Endoscopy;  Laterality: N/A;    ESOPHAGOGASTRODUODENOSCOPY N/A 2022    Procedure: EGD (ESOPHAGOGASTRODUODENOSCOPY);  Surgeon: Marcelo Zhong MD;  Location: [facility];  Service: Endoscopy;   Laterality: N/A;    LAPAROSCOPIC CHOLECYSTECTOMY N/A 07/30/2022    Procedure: CHOLECYSTECTOMY, LAPAROSCOPIC;  Surgeon: Grey Perez MD;  Location: [facility];  Service: General;  Laterality: N/A;    PLACEMENT OF DUAL-LUMEN VASCULAR CATHETER Left 07/12/2022    Procedure: INSERTION-CATHETER-JOSEPH;  Surgeon: Dionte Gan MD;  Location: [facility] OR;  Service: General;  Laterality: Left;    PLACEMENT OF DUAL-LUMEN VASCULAR CATHETER Right 07/26/2022    Procedure: INSERTION-CATHETER-Hemosplit;  Surgeon: Dionte Gan MD;  Location: [facility] OR;  Service: General;  Laterality: Right;     Family History   Problem Relation Age of Onset    Diabetes Mother     Diabetes Father      Social History     Tobacco Use    Smoking status: Never    Smokeless tobacco: Never   Substance Use Topics    Alcohol use: Not Currently    Drug use: No       Review of patient's allergies indicates:   Allergen Reactions    Penicillins Hives       Outpatient meds:  No current facility-administered medications on file prior to encounter.     Current Outpatient Medications on File Prior to Encounter   Medication Sig Dispense Refill    albuterol (PROVENTIL/VENTOLIN HFA) 90 mcg/actuation inhaler Inhale 2 puffs into the lungs every 6 (six) hours as needed for Wheezing. Rescue 18 g 3    amLODIPine (NORVASC) 10 MG tablet Take 1 tablet (10 mg total) by mouth once daily. 30 tablet 11    apixaban (ELIQUIS) 5 mg Tab Take 1 tablet (5 mg total) by mouth 2 (two) times daily. 60 tablet 2    carvediloL (COREG) 25 MG tablet Take 1 tablet (25 mg total) by mouth 2 (two) times daily. 60 tablet 2    cloNIDine 0.2 mg/24 hr td ptwk (CATAPRES) 0.2 mg/24 hr Place 1 patch onto the skin every 7 days. 4 patch 2    dicyclomine (BENTYL) 10 MG capsule Take 1 capsule (10 mg total) by mouth every 8 (eight) hours as needed (abdominal pain). 30 capsule 0    FLUoxetine 20 MG capsule Take 1 capsule (20 mg total) by mouth once daily. 30 capsule 11    gabapentin (NEURONTIN) 100 MG capsule  "Take 1 capsule (100 mg total) by mouth 2 (two) times daily. 90 capsule 2    hydrALAZINE (APRESOLINE) 100 MG tablet Take 1 tablet (100 mg total) by mouth every 8 (eight) hours. 90 tablet 2    insulin aspart U-100 (NOVOLOG) 100 unit/mL (3 mL) InPn pen Inject 1-10 Units into the skin before meals and at bedtime as needed (Hyperglycemia). Max daily dose 40 units. 3 mL 6    insulin detemir U-100 (LEVEMIR FLEXTOUCH) 100 unit/mL (3 mL) SubQ InPn pen Inject 9 Units into the skin once daily. 6 mL 2    isosorbide mononitrate (IMDUR) 60 MG 24 hr tablet Take 2 tablets (120 mg total) by mouth once daily. 30 tablet 11    levETIRAcetam (KEPPRA) 500 MG Tab Take 1 tablet (500 mg total) by mouth 2 (two) times daily. 60 tablet 11    methocarbamoL (ROBAXIN) 500 MG Tab Take 1 tablet (500 mg total) by mouth 3 (three) times daily. 90 tablet 1    mirtazapine (REMERON SOL-TAB) 15 MG disintegrating tablet Take 1 tablet (15 mg total) by mouth nightly. 90 tablet 0    ondansetron (ZOFRAN) 4 MG tablet Take 1 tablet (4 mg total) by mouth every 8 (eight) hours as needed for Nausea. 60 tablet 2    pantoprazole (PROTONIX) 40 MG tablet Take 40 mg by mouth once daily.      pen needle, diabetic (BD ULTRA-FINE MAGALIE PEN NEEDLE) 32 gauge x 5/32" Ndle Inject into the skin 5 times per day with insulin 100 each 12    promethazine (PHENERGAN) 12.5 MG Tab Take 1 tablet (12.5 mg total) by mouth every 12 (twelve) hours as needed (nausea/vomiting not relieved with ondansetron (zofran)). 12 tablet 0    traMADoL (ULTRAM) 50 mg tablet Take 1 tablet (50 mg total) by mouth every 8 (eight) hours as needed for Pain. 30 tablet 0    [DISCONTINUED] atenoloL (TENORMIN) 50 MG tablet Take 1 tablet (50 mg total) by mouth every other day. 45 tablet 3    [DISCONTINUED] omeprazole (PRILOSEC) 20 MG capsule Take 2 capsules (40 mg total) by mouth once daily. for 10 days 20 capsule 0    [DISCONTINUED] torsemide (DEMADEX) 20 MG Tab Take 1 tablet (20 mg total) by mouth 2 (two) times a " day. (Patient taking differently: Take 20 mg by mouth once daily.) 60 tablet 1       Scheduled meds:   amLODIPine  10 mg Oral Daily    apixaban  2.5 mg Oral BID    carvediloL  25 mg Oral BID    dicyclomine  10 mg Oral TID    epoetin teresa (PROCRIT) injection  50 Units/kg Subcutaneous Once    FLUoxetine  20 mg Oral QHS    gabapentin  100 mg Oral BID    hydrALAZINE  100 mg Oral Q8H    insulin regular  10 Units Intravenous Once    isosorbide mononitrate  120 mg Oral Daily    levETIRAcetam  500 mg Oral BID    mirtazapine  15 mg Oral QHS    pantoprazole  40 mg Oral Daily       Infusions:   dextrose 5 % and 0.9 % NaCl      insulin regular 1 units/mL infusion orderable (DKA) 4 Units/hr (12/09/22 8514)       PRN meds:  acetaminophen, dextrose 10%, dextrose 10%, dextrose 5 % and 0.9 % NaCl, HYDROmorphone, labetaloL, ondansetron, promethazine (PHENERGAN) IVPB, sodium chloride 0.9%, traMADoL    Review of Systems:  Review of Systems   Constitutional:  Positive for malaise/fatigue. Negative for chills, fever and weight loss.   HENT:  Negative for hearing loss and nosebleeds.    Eyes:  Negative for blurred vision, double vision and photophobia.   Respiratory:  Negative for cough, shortness of breath and wheezing.    Cardiovascular:  Negative for chest pain, palpitations and leg swelling.   Gastrointestinal:  Positive for abdominal pain. Negative for constipation, diarrhea, heartburn, nausea and vomiting.   Genitourinary:  Negative for dysuria, frequency and urgency.   Musculoskeletal:  Negative for falls, joint pain and myalgias.   Skin:  Negative for itching and rash.   Neurological:  Positive for weakness. Negative for dizziness, speech change, focal weakness, loss of consciousness and headaches.   Endo/Heme/Allergies:  Does not bruise/bleed easily.   Psychiatric/Behavioral:  Negative for depression and substance abuse. The patient is not nervous/anxious.      OBJECTIVE:     Vital Signs and IO:  Temp:  [97.5 °F (36.4 °C)]    Pulse:  []   Resp:  [20-22]   BP: (184-224)/(110-122)   SpO2:  [94 %-100 %]   No intake/output data recorded.  Wt Readings from Last 5 Encounters:   12/09/22 60 kg (132 lb 4.4 oz)   12/02/22 59 kg (129 lb 15.7 oz)   11/19/22 56 kg (123 lb 7.3 oz)   11/20/22 56 kg (123 lb 7.3 oz)   11/12/22 57.1 kg (125 lb 14.1 oz)     Body mass index is 24.19 kg/m².    Physical Exam  Constitutional:       General: She is not in acute distress.     Appearance: She is well-developed. She is not diaphoretic.   HENT:      Head: Normocephalic and atraumatic.      Mouth/Throat:      Mouth: Mucous membranes are moist.   Eyes:      General: No scleral icterus.     Pupils: Pupils are equal, round, and reactive to light.   Cardiovascular:      Rate and Rhythm: Normal rate and regular rhythm.   Pulmonary:      Effort: Pulmonary effort is normal. No respiratory distress.      Breath sounds: No stridor.   Abdominal:      General: There is no distension.      Palpations: Abdomen is soft.   Musculoskeletal:         General: No deformity. Normal range of motion.      Cervical back: Neck supple.   Skin:     General: Skin is warm and dry.      Findings: No erythema or rash.   Neurological:      Mental Status: She is alert and oriented to person, place, and time.      Cranial Nerves: No cranial nerve deficit.   Psychiatric:         Behavior: Behavior normal.       Laboratory:  Recent Labs   Lab 12/07/22  0525 12/09/22  0955 12/09/22  1513    128* 134*   K 4.0 4.7 3.9    90* 93*   CO2 25 21* 22*   BUN 20 42* 49*   CREATININE 3.5* 3.4* 3.6*   * 645* 719*       Recent Labs   Lab 12/03/22  0456 12/04/22  0843 12/05/22  0813 12/06/22  0547 12/07/22  0525 12/09/22  0955 12/09/22  1513   CALCIUM 8.6* 8.8   < > 8.3* 8.9 9.7 9.4   ALBUMIN 2.8* 2.9*   < > 2.8* 2.8* 3.7  --    PHOS 3.7  --   --   --   --   --   --    MG 2.1 1.9  --   --   --   --   --     < > = values in this interval not displayed.       Recent Labs   Lab  07/08/22  0516   PTH, Intact 289.8 H       Recent Labs   Lab 12/06/22  1002 12/06/22  1055 12/06/22  1156 12/06/22  2226 12/07/22  0745 12/07/22  1142 12/09/22  0927 12/09/22  0929 12/09/22  1418 12/09/22  1423   POCTGLUCOSE 297* 192* 161* 267* 247* 254* >500* >500* >500* >500*       Recent Labs   Lab 05/15/22  1954 07/22/22  1653 08/24/22  0218   Hemoglobin A1C 5.8 H 6.0 H 6.2       Recent Labs   Lab 12/06/22  0547 12/07/22  0525 12/09/22  0955   WBC 4.61 6.95 7.25   HGB 7.7* 10.7* 10.5*   HCT 23.1* 31.6* 30.5*   PLT 72* 90* 63*   MCV 85 82 81*   MCHC 33.3 33.9 34.4   MONO 9.5  0.4 11.2  0.8 6.3  0.5       Recent Labs   Lab 12/06/22  0547 12/07/22  0525 12/09/22  0955   BILITOT 1.4* 1.9* 3.3*   PROT 6.0 6.5 8.4   ALBUMIN 2.8* 2.8* 3.7   ALKPHOS 307* 319* 377*   ALT 44 41 38   AST 27 23 33       Recent Labs   Lab 10/16/22  1736 10/24/22  0107 11/19/22  1210   Color, UA Yellow Yellow Yellow   Appearance, UA Hazy A Clear Clear   pH, UA 7.0 8.0 8.0   Specific [place], UA 1.020 1.020 1.025   Protein, UA 4+ 4+ 4+   Glucose, UA 4+ A 4+ A 4+ A   Ketones, UA Trace A Negative Negative   Urobilinogen, UA Negative Negative Negative   Bilirubin (UA) Negative Negative 1+ A   Occult Blood UA 2+ A 2+ A 2+ A   Nitrite, UA Negative Negative Negative   RBC, UA 12 H 41 H 38 H   WBC, UA >100 H 24 H 25 H   Bacteria Negative Negative Negative   Hyaline Casts, UA 7 A 6 A 2 A       Recent Labs   Lab 10/16/22  1643 11/30/22 1942 12/09/22  1056   POC PH 7.398 7.434 7.464 H   POC PCO2 37.6 39.8 38.1   POC HCO3 23.2 L 26.6 27.3   POC PO2 55 52 43   POC SATURATED O2 88 L 87 L 81 L   POC BE -2 2 4   Sample VENOUS VENOUS VENOUS       Microbiology Results (last 7 days)       ** No results found for the last 168 hours. **            ASSESSMENT/PLAN:     ESRD on HD TTS via AVF  Continue current dialysis prescription.  Next HD per schedule.  Renal diet - low K, low phos.  No IVs or BP checks on access and/or non-dominant arm.    Anemia of  CKD  Hgb and HCT are acceptable. Monitor for now.  Will provide SHELLEY and/or IV iron PRN.    MBD / Secondary HPT  Ca, Phos, PTH and vitamin D levels are acceptable.   Phos binders, vitamin D and analogues, calcimimetics will be given as needed.    HTN  BP seem controlled.   Tolerate asymptomatic HTN up to -160.  Continue home meds.  Low sodium diet.    DKA  Abdominal pain  Management per primary team    Thank you for allowing us to participate in the care of your patient!   We will follow the patient and provide recommendations as needed.    Patient care time was spent personally by me on the following activities:     Obtaining a history.  Examination of patient.  Providing medical care at the patients bedside.  Developing a treatment plan with patient or surrogate and bedside caregivers.  Ordering and reviewing laboratory studies, radiographic studies, pulse oximetry.  Ordering and performing treatments and interventions.  Evaluation of patient's response to treatment.  Discussions with consultants while on the unit and immediately available to the patient.  Re-evaluation of the patient's condition.  Documentation in the medical record.     Mando Benitez MD    [facility]  [address] [place] [address]  JEANMARIE Conn[place] [address]    [phone] - tel  [phone] - fax    12/9/2022

## 2022-12-09 NOTE — ASSESSMENT & PLAN NOTE
Noted elevated anion gap, hyperglycemia, and serum ketones  S/p 2L IVFH in ER, will hold further IVFH due to tenuous volume status with hx HFpEF and ESRD on HD  To start insulin gtt and follow protocol for titration  Monitor BMP q4, glucose q1  NPO for now

## 2022-12-09 NOTE — ASSESSMENT & PLAN NOTE
Noted, chronic  Had EGD 12/5 consistent with this, no ulcers or other severe findings  Continue ppi

## 2022-12-09 NOTE — NURSING
Nurses Note -- 4 Eyes      12/9/2022   5:43 PM      Skin assessed during: Admit      [x] No Pressure Injuries Present    [x]Prevention Measures Documented      [] Yes- Altered Skin Integrity Present or Discovered   [] LDA Added if Not in Epic (Describe Wound)   [] New Altered Skin Integrity was Present on Admit and Documented in LDA   [] Wound Image Taken    Wound Care Consulted? No    Attending Nurse:  Alice Gonzalez RN     Second RN/Staff Member:  Amanda Estrada RN

## 2022-12-09 NOTE — ED NOTES
Pt to the ED per POV with c/o generalized abd, chest and back pain. Pt states that she was recently seen in the hospital for the same problem. Pt is rocking back and forth in bed, unable to lie still for assessment. Pt c/o nausea and dry heaving currently. Pt has clonidine patch on. Pt reports a T/TH/Sa dialysis schedule and reports getting a full treatment yesterday.

## 2022-12-09 NOTE — ED NOTES
Pt sticking fingers down throat to induce vomiting. Educated patient this was not good for her. Pt verbalized understanding. WCTM

## 2022-12-09 NOTE — HPI
32F with PMH HTN, IDDM, ESRD on HD, HFpEF, seizure disorder, adjustment disorder, GERD, DVT, and idiopathic abdominal pain presents to the ER due to severe abdominal pain. Associated symptoms include fatigue, nausea, vomiting, chest pain, and back pain. She denies SOB. She reports compliance with her scheduled dialysis with last session yesterday 12/8. She reports adherence to her insulin regimen. Of note, she had a recent hospital stay 11/30-12/7 for abdominal pain, nausea, and vomiting for which no clear etiology of her pain was identified. Found to have lab findings consistent with DKA in ER in addition to her intractable pain. Admitted to hospital medicine for further management.

## 2022-12-09 NOTE — ED NOTES
Pt reporting 10/10 pain at this time.    O-T Advancement Flap Text: The defect edges were debeveled with a #15 scalpel blade.  Given the location of the defect, shape of the defect and the proximity to free margins an O-T advancement flap was deemed most appropriate.  Using a sterile surgical marker, an appropriate advancement flap was drawn incorporating the defect and placing the expected incisions within the relaxed skin tension lines where possible.    The area thus outlined was incised deep to adipose tissue with a #15 scalpel blade.  The skin margins were undermined to an appropriate distance in all directions utilizing iris scissors.

## 2022-12-09 NOTE — ASSESSMENT & PLAN NOTE
Noted venous LUE 7/24/22: Partially occlusive thrombus within the left internal jugular and cephalic veins.  Continue home eliquis

## 2022-12-09 NOTE — ASSESSMENT & PLAN NOTE
Acute on chronic  Has had multiple admissions for this without clear etiology  Likely some aspect of gastritis, gastroparesis, and/or other dysmotility from chronic conditions  There has been prior concern for pain med seeking  Trop neg x1, will repeat one more  Scheduled bentyl  Pain meds prn, antiemetics prn

## 2022-12-09 NOTE — ED NOTES
Pt states that the pain will not go away with the previous two medications. Requesting dilaudid. MD notified

## 2022-12-09 NOTE — NURSING
Phenergan 12.5 mg in dextrose 5% 50 ml ordered, Pt has blood sugar of 719. Discussed with Pharmacist and Dr Radha oconnell to mix with Normal saline instead of Dextrose 5%.

## 2022-12-09 NOTE — ASSESSMENT & PLAN NOTE
Recently assessed by psychiatry MD consult last admission  Continue home fluoxetine and mirtazapine

## 2022-12-09 NOTE — NURSING
Pt AAO x 3, pt c/o constant pain. Vitals stable. Updated pt with care and pain management, verbalized understanding.

## 2022-12-09 NOTE — ASSESSMENT & PLAN NOTE
Chronic, uncontrolled currently  Continue home meds amlodipine, coreg, hydralazine  Labetalol prn  Adjust regimen as needed

## 2022-12-09 NOTE — ASSESSMENT & PLAN NOTE
Patient's FSGs are uncontrolled due to hyperglycemia on current medication regimen.  Last A1c reviewed-   Lab Results   Component Value Date    HGBA1C 6.2 08/24/2022     Most recent fingerstick glucose reviewed-   Recent Labs   Lab 12/09/22  0927 12/09/22  0929 12/09/22  1418 12/09/22  1423   POCTGLUCOSE >500* >500* >500* >500*     Current correctional scale  on insulin gtt  Maintain anti-hyperglycemic dose as follows-   Antihyperglycemics (From admission, onward)    Start     Stop Route Frequency Ordered    12/09/22 1515  insulin regular 1 Units/mL in sodium chloride 0.9% 100 mL infusion        Question Answer Comment   Insulin Rate Adjustment (DO NOT MODIFY ANSWER) [email or web address]    Enter initial dose from Infusion Protocol Chart (Units/hr): 3        -- IV Continuous 12/09/22 1417        Hold Oral hypoglycemics while patient is in the hospital.  Adjust regimen for goal glucose <180

## 2022-12-09 NOTE — ASSESSMENT & PLAN NOTE
Patient's anemia is currently controlled. Has not received any PRBCs to date this admission. Etiology likely d/t chronic disease.  Current CBC reviewed-   Lab Results   Component Value Date    HGB 10.5 (L) 12/09/2022    HCT 30.5 (L) 12/09/2022     Monitor serial CBC and transfuse if patient becomes hemodynamically unstable, symptomatic or H/H drops below 7/21.

## 2022-12-10 PROBLEM — Z99.2 ANEMIA DUE TO CHRONIC KIDNEY DISEASE, ON CHRONIC DIALYSIS: Status: RESOLVED | Noted: 2022-10-26 | Resolved: 2022-12-10

## 2022-12-10 PROBLEM — D63.1 ANEMIA DUE TO CHRONIC KIDNEY DISEASE, ON CHRONIC DIALYSIS: Status: RESOLVED | Noted: 2022-10-26 | Resolved: 2022-12-10

## 2022-12-10 PROBLEM — N18.6 ANEMIA DUE TO CHRONIC KIDNEY DISEASE, ON CHRONIC DIALYSIS: Status: RESOLVED | Noted: 2022-10-26 | Resolved: 2022-12-10

## 2022-12-10 PROBLEM — E08.8 DIABETES MELLITUS DUE TO UNDERLYING CONDITION WITH UNSPECIFIED COMPLICATIONS: Status: RESOLVED | Noted: 2022-04-12 | Resolved: 2022-12-10

## 2022-12-10 LAB
ANION GAP SERPL CALC-SCNC: 11 MMOL/L (ref 8–16)
ANION GAP SERPL CALC-SCNC: 12 MMOL/L (ref 8–16)
ANION GAP SERPL CALC-SCNC: 14 MMOL/L (ref 8–16)
BASOPHILS # BLD AUTO: 0.02 K/UL (ref 0–0.2)
BASOPHILS NFR BLD: 0.2 % (ref 0–1.9)
BUN SERPL-MCNC: 22 MG/DL (ref 6–20)
BUN SERPL-MCNC: 55 MG/DL (ref 6–20)
BUN SERPL-MCNC: 55 MG/DL (ref 6–20)
CALCIUM SERPL-MCNC: 8.6 MG/DL (ref 8.7–10.5)
CALCIUM SERPL-MCNC: 9 MG/DL (ref 8.7–10.5)
CALCIUM SERPL-MCNC: 9 MG/DL (ref 8.7–10.5)
CHLORIDE SERPL-SCNC: 101 MMOL/L (ref 95–110)
CHLORIDE SERPL-SCNC: 103 MMOL/L (ref 95–110)
CHLORIDE SERPL-SCNC: 106 MMOL/L (ref 95–110)
CO2 SERPL-SCNC: 23 MMOL/L (ref 23–29)
CO2 SERPL-SCNC: 24 MMOL/L (ref 23–29)
CO2 SERPL-SCNC: 24 MMOL/L (ref 23–29)
CREAT SERPL-MCNC: 2.3 MG/DL (ref 0.5–1.4)
CREAT SERPL-MCNC: 3.9 MG/DL (ref 0.5–1.4)
CREAT SERPL-MCNC: 3.9 MG/DL (ref 0.5–1.4)
DIFFERENTIAL METHOD: ABNORMAL
EOSINOPHIL # BLD AUTO: 0 K/UL (ref 0–0.5)
EOSINOPHIL NFR BLD: 0.1 % (ref 0–8)
ERYTHROCYTE [DISTWIDTH] IN BLOOD BY AUTOMATED COUNT: 16.2 % (ref 11.5–14.5)
EST. GFR  (NO RACE VARIABLE): 15 ML/MIN/1.73 M^2
EST. GFR  (NO RACE VARIABLE): 15 ML/MIN/1.73 M^2
EST. GFR  (NO RACE VARIABLE): 28 ML/MIN/1.73 M^2
GLUCOSE SERPL-MCNC: 114 MG/DL (ref 70–110)
GLUCOSE SERPL-MCNC: 151 MG/DL (ref 70–110)
GLUCOSE SERPL-MCNC: 207 MG/DL (ref 70–110)
HCT VFR BLD AUTO: 27.1 % (ref 37–48.5)
HGB BLD-MCNC: 9.3 G/DL (ref 12–16)
IMM GRANULOCYTES # BLD AUTO: 0.05 K/UL (ref 0–0.04)
IMM GRANULOCYTES NFR BLD AUTO: 0.5 % (ref 0–0.5)
LYMPHOCYTES # BLD AUTO: 0.6 K/UL (ref 1–4.8)
LYMPHOCYTES NFR BLD: 5.6 % (ref 18–48)
MAGNESIUM SERPL-MCNC: 1.9 MG/DL (ref 1.6–2.6)
MCH RBC QN AUTO: 27.8 PG (ref 27–31)
MCHC RBC AUTO-ENTMCNC: 34.3 G/DL (ref 32–36)
MCV RBC AUTO: 81 FL (ref 82–98)
MONOCYTES # BLD AUTO: 0.8 K/UL (ref 0.3–1)
MONOCYTES NFR BLD: 7.9 % (ref 4–15)
NEUTROPHILS # BLD AUTO: 8.8 K/UL (ref 1.8–7.7)
NEUTROPHILS NFR BLD: 85.7 % (ref 38–73)
NRBC BLD-RTO: 0 /100 WBC
PHOSPHATE SERPL-MCNC: 4.5 MG/DL (ref 2.7–4.5)
PLATELET # BLD AUTO: 75 K/UL (ref 150–450)
PMV BLD AUTO: 9.7 FL (ref 9.2–12.9)
POCT GLUCOSE: 111 MG/DL (ref 70–110)
POCT GLUCOSE: 123 MG/DL (ref 70–110)
POCT GLUCOSE: 129 MG/DL (ref 70–110)
POCT GLUCOSE: 136 MG/DL (ref 70–110)
POCT GLUCOSE: 137 MG/DL (ref 70–110)
POCT GLUCOSE: 155 MG/DL (ref 70–110)
POCT GLUCOSE: 159 MG/DL (ref 70–110)
POCT GLUCOSE: 162 MG/DL (ref 70–110)
POCT GLUCOSE: 216 MG/DL (ref 70–110)
POCT GLUCOSE: 227 MG/DL (ref 70–110)
POCT GLUCOSE: 290 MG/DL (ref 70–110)
POCT GLUCOSE: 297 MG/DL (ref 70–110)
POCT GLUCOSE: 316 MG/DL (ref 70–110)
POCT GLUCOSE: 325 MG/DL (ref 70–110)
POCT GLUCOSE: 89 MG/DL (ref 70–110)
POCT GLUCOSE: 94 MG/DL (ref 70–110)
POCT GLUCOSE: 95 MG/DL (ref 70–110)
POTASSIUM SERPL-SCNC: 3.4 MMOL/L (ref 3.5–5.1)
POTASSIUM SERPL-SCNC: 3.4 MMOL/L (ref 3.5–5.1)
POTASSIUM SERPL-SCNC: 4 MMOL/L (ref 3.5–5.1)
RBC # BLD AUTO: 3.35 M/UL (ref 4–5.4)
SODIUM SERPL-SCNC: 139 MMOL/L (ref 136–145)
SODIUM SERPL-SCNC: 139 MMOL/L (ref 136–145)
SODIUM SERPL-SCNC: 140 MMOL/L (ref 136–145)
WBC # BLD AUTO: 10.24 K/UL (ref 3.9–12.7)

## 2022-12-10 PROCEDURE — 63600175 PHARM REV CODE 636 W HCPCS: Performed by: NURSE PRACTITIONER

## 2022-12-10 PROCEDURE — C9113 INJ PANTOPRAZOLE SODIUM, VIA: HCPCS | Performed by: NURSE PRACTITIONER

## 2022-12-10 PROCEDURE — 27000221 HC OXYGEN, UP TO 24 HOURS

## 2022-12-10 PROCEDURE — 85025 COMPLETE CBC W/AUTO DIFF WBC: CPT | Performed by: STUDENT IN AN ORGANIZED HEALTH CARE EDUCATION/TRAINING PROGRAM

## 2022-12-10 PROCEDURE — 80100014 HC HEMODIALYSIS 1:1

## 2022-12-10 PROCEDURE — 20600001 HC STEP DOWN PRIVATE ROOM

## 2022-12-10 PROCEDURE — 94761 N-INVAS EAR/PLS OXIMETRY MLT: CPT

## 2022-12-10 PROCEDURE — 25000003 PHARM REV CODE 250: Performed by: NURSE PRACTITIONER

## 2022-12-10 PROCEDURE — 63600175 PHARM REV CODE 636 W HCPCS: Performed by: INTERNAL MEDICINE

## 2022-12-10 PROCEDURE — 25000003 PHARM REV CODE 250: Performed by: STUDENT IN AN ORGANIZED HEALTH CARE EDUCATION/TRAINING PROGRAM

## 2022-12-10 PROCEDURE — 80048 BASIC METABOLIC PNL TOTAL CA: CPT | Performed by: STUDENT IN AN ORGANIZED HEALTH CARE EDUCATION/TRAINING PROGRAM

## 2022-12-10 PROCEDURE — 63600175 PHARM REV CODE 636 W HCPCS: Performed by: STUDENT IN AN ORGANIZED HEALTH CARE EDUCATION/TRAINING PROGRAM

## 2022-12-10 PROCEDURE — 84100 ASSAY OF PHOSPHORUS: CPT | Performed by: STUDENT IN AN ORGANIZED HEALTH CARE EDUCATION/TRAINING PROGRAM

## 2022-12-10 PROCEDURE — 83735 ASSAY OF MAGNESIUM: CPT | Performed by: STUDENT IN AN ORGANIZED HEALTH CARE EDUCATION/TRAINING PROGRAM

## 2022-12-10 PROCEDURE — 36415 COLL VENOUS BLD VENIPUNCTURE: CPT | Performed by: STUDENT IN AN ORGANIZED HEALTH CARE EDUCATION/TRAINING PROGRAM

## 2022-12-10 RX ORDER — INSULIN ASPART 100 [IU]/ML
0-5 INJECTION, SOLUTION INTRAVENOUS; SUBCUTANEOUS
Status: DISCONTINUED | OUTPATIENT
Start: 2022-12-10 | End: 2022-12-14 | Stop reason: HOSPADM

## 2022-12-10 RX ORDER — POTASSIUM CHLORIDE 7.45 MG/ML
10 INJECTION INTRAVENOUS
Status: DISPENSED | OUTPATIENT
Start: 2022-12-10 | End: 2022-12-10

## 2022-12-10 RX ORDER — POTASSIUM CHLORIDE 7.45 MG/ML
10 INJECTION INTRAVENOUS
Status: DISCONTINUED | OUTPATIENT
Start: 2022-12-10 | End: 2022-12-10

## 2022-12-10 RX ORDER — PANTOPRAZOLE SODIUM 40 MG/10ML
40 INJECTION, POWDER, LYOPHILIZED, FOR SOLUTION INTRAVENOUS DAILY
Status: DISCONTINUED | OUTPATIENT
Start: 2022-12-10 | End: 2022-12-14 | Stop reason: HOSPADM

## 2022-12-10 RX ORDER — HYDROMORPHONE HYDROCHLORIDE 1 MG/ML
1 INJECTION, SOLUTION INTRAMUSCULAR; INTRAVENOUS; SUBCUTANEOUS ONCE
Status: COMPLETED | OUTPATIENT
Start: 2022-12-10 | End: 2022-12-10

## 2022-12-10 RX ADMIN — LORAZEPAM 2 MG: 2 INJECTION INTRAMUSCULAR; INTRAVENOUS at 10:12

## 2022-12-10 RX ADMIN — HYDROMORPHONE HYDROCHLORIDE 0.5 MG: 2 INJECTION INTRAMUSCULAR; INTRAVENOUS; SUBCUTANEOUS at 03:12

## 2022-12-10 RX ADMIN — HYDROMORPHONE HYDROCHLORIDE 1 MG: 1 INJECTION, SOLUTION INTRAMUSCULAR; INTRAVENOUS; SUBCUTANEOUS at 03:12

## 2022-12-10 RX ADMIN — PANTOPRAZOLE SODIUM 40 MG: 40 INJECTION, POWDER, FOR SOLUTION INTRAVENOUS at 03:12

## 2022-12-10 RX ADMIN — APIXABAN 2.5 MG: 2.5 TABLET, FILM COATED ORAL at 09:12

## 2022-12-10 RX ADMIN — LEVETIRACETAM 500 MG: 500 TABLET, FILM COATED ORAL at 09:12

## 2022-12-10 RX ADMIN — LABETALOL HYDROCHLORIDE 10 MG: 5 INJECTION INTRAVENOUS at 03:12

## 2022-12-10 RX ADMIN — ONDANSETRON 4 MG: 2 INJECTION INTRAMUSCULAR; INTRAVENOUS at 09:12

## 2022-12-10 RX ADMIN — DEXTROSE AND SODIUM CHLORIDE: 5; .9 INJECTION, SOLUTION INTRAVENOUS at 04:12

## 2022-12-10 RX ADMIN — ONDANSETRON 4 MG: 2 INJECTION INTRAMUSCULAR; INTRAVENOUS at 03:12

## 2022-12-10 RX ADMIN — MUPIROCIN: 20 OINTMENT TOPICAL at 09:12

## 2022-12-10 RX ADMIN — HYDRALAZINE HYDROCHLORIDE 20 MG: 20 INJECTION INTRAMUSCULAR; INTRAVENOUS at 06:12

## 2022-12-10 RX ADMIN — CARVEDILOL 25 MG: 25 TABLET, FILM COATED ORAL at 09:12

## 2022-12-10 RX ADMIN — DICYCLOMINE HYDROCHLORIDE 10 MG: 10 CAPSULE ORAL at 09:12

## 2022-12-10 RX ADMIN — POTASSIUM CHLORIDE 10 MEQ: 7.46 INJECTION, SOLUTION INTRAVENOUS at 02:12

## 2022-12-10 RX ADMIN — HYDROMORPHONE HYDROCHLORIDE 0.5 MG: 2 INJECTION INTRAMUSCULAR; INTRAVENOUS; SUBCUTANEOUS at 12:12

## 2022-12-10 RX ADMIN — GABAPENTIN 100 MG: 100 CAPSULE ORAL at 09:12

## 2022-12-10 RX ADMIN — PROMETHAZINE HYDROCHLORIDE 25 MG: 25 INJECTION INTRAMUSCULAR; INTRAVENOUS at 03:12

## 2022-12-10 RX ADMIN — HYDROMORPHONE HYDROCHLORIDE 0.5 MG: 2 INJECTION INTRAMUSCULAR; INTRAVENOUS; SUBCUTANEOUS at 09:12

## 2022-12-10 RX ADMIN — MIRTAZAPINE 15 MG: 15 TABLET, FILM COATED ORAL at 09:12

## 2022-12-10 RX ADMIN — HYDRALAZINE HYDROCHLORIDE 20 MG: 20 INJECTION INTRAMUSCULAR; INTRAVENOUS at 02:12

## 2022-12-10 RX ADMIN — LABETALOL HYDROCHLORIDE 10 MG: 5 INJECTION INTRAVENOUS at 08:12

## 2022-12-10 RX ADMIN — POTASSIUM CHLORIDE 10 MEQ: 7.46 INJECTION, SOLUTION INTRAVENOUS at 06:12

## 2022-12-10 RX ADMIN — HYDRALAZINE HYDROCHLORIDE 100 MG: 25 TABLET, FILM COATED ORAL at 09:12

## 2022-12-10 RX ADMIN — INSULIN DETEMIR 4 UNITS: 100 INJECTION, SOLUTION SUBCUTANEOUS at 09:12

## 2022-12-10 RX ADMIN — FLUOXETINE 20 MG: 20 CAPSULE ORAL at 09:12

## 2022-12-10 RX ADMIN — POTASSIUM CHLORIDE 10 MEQ: 7.46 INJECTION, SOLUTION INTRAVENOUS at 04:12

## 2022-12-10 RX ADMIN — ONDANSETRON 4 MG: 2 INJECTION INTRAMUSCULAR; INTRAVENOUS at 02:12

## 2022-12-10 RX ADMIN — INSULIN DETEMIR 9 UNITS: 100 INJECTION, SOLUTION SUBCUTANEOUS at 09:12

## 2022-12-10 NOTE — CONSULTS
[facility]  Adult Nutrition  Consult Note    SUMMARY   Recommendations  1) Advance PO diet to DM 1500 kcal, renal diet as pt tolerates  2) Add novasource BID   3) weigh s/p HD   4) Nutrition education review at f/u, handouts and verbal education given 12/10/22  5) Continue appetite stimulant     Goals: 1) PO intakes > 50% of meals and supplements on solids at f/u  Nutrition Goal Status: new  Communication of RD Recs:  (POC, sticky note, reviewed with RN)     Assessment and Plan     Malnutrition of moderate degree  Contributing Nutrition Diagnosis  Possible Severe chronic condition related malnutrition     Related to (etiology):   Abd. Pain and decreased appetite     Signs and Symptoms (as evidenced by):   1) PO intakes < 75% of needs x > 1 month ( likely)  2) > 20% wt loss x <1 year     Interventions:  Nutrition education, clear liquid diet     Nutrition Diagnosis Status:   new              Malnutrition Assessment  Skin (Micronutrient):  (Noam = 20)   Micronutrient Evaluation: suspected deficiency  Micronutrient Evaluation Comments: check iron   Weight Loss (Malnutrition): greater than 20% x < 1 year    likely < 75% of needs x > 1 month      Edema (Fluid Accumulation): 0-->no edema present   Subcutaneous Fat Loss (Final Summary): well nourished  Muscle Loss Evaluation (Final Summary): well nourished       Reason for Assessment     Reason For Assessment: consult  Diagnosis:  DKA  Relevant Medical History: ESRD on HD, gastroparesis, anemia, DM2, adjustment disorder  Interdisciplinary Rounds: not attended, none today     General Information Comments: 33 y/o female admitted with chest pain and gastroparesis. Multiple readmissions this year r/t pain, ? Source, per notes, concern for med seeking behavior. In the past pt with abd. Pain r/t gastroparesis and last admission was noted by RN making herself vomit.  Pt continues with HD inpatient. I attempted to ask pt interview questions and to give  "nutrition education, pt would not answer my questions either despite being awake, closed her eye and barely moved her head in response, unable to get clear responses. I went ahead and gave diabetic/gastroparesis education verbally and left resource on pt's tray table though pt refused to participate in the conversation. Covered with blanket, pt did not want NFPE done today. Visually appears nourished, no wasting. Significant wt loss per chart review.     Nutrition Discharge Planning: To be determined- renal, DM 1500 liliana diet + Novasource BID     Nutrition Risk Screen     Nutrition Risk Screen: none     Nutrition/Diet History     Patient Reported Diet/Restrictions/Preferences: renal  Typical Food/Fluid Intake:  22- A1C dropped from 11-5.3 in recent years, does not eat a well-balanced diet, likes hamburgers.   Spiritual, Cultural Beliefs, Hinduism Practices, Values that Affect Care: no  Food Allergies: NKFA  Factors Affecting Nutritional Intake: pain, clear liquid diet, decreased appetite     Anthropometrics    Height Method: Stated  Height: 5' 2"  Height (inches): 62 in  Weight Method: Bed Scale  Weight: 56.3 kg 22   Weight (lb): 124 lb  Ideal Body Weight (IBW), Female: 110 lb  BMI (Calculated): 23 kg/m2  BMI Grade: WDL  Weight Loss: unintentional  Usual Body Weight (UBW), k.2 kg (22)  Some wt loss likely r/t fluid shifts as pt on HD     Lab/Procedures/Meds     Pertinent Labs Reviewed: reviewed  BMP  Lab Results   Component Value Date     12/10/2022    K 3.4 (L) 12/10/2022     12/10/2022    CO2 24 12/10/2022    BUN 55 (H) 12/10/2022    CREATININE 3.9 (H) 12/10/2022    CALCIUM 8.6 (L) 12/10/2022    ANIONGAP 12 12/10/2022    EGFRNORACEVR 15 (A) 12/10/2022     Lab Results   Component Value Date    ALBUMIN 3.7 2022     Lab Results   Component Value Date    CALCIUM 8.6 (L) 12/10/2022    PHOS 4.5 12/10/2022     POCT Glucose   Date Value Ref Range Status   12/10/2022 111 (H) 70 - 110 " mg/dL Final   12/10/2022 95 70 - 110 mg/dL Final   12/10/2022 89 70 - 110 mg/dL Final   12/10/2022 94 70 - 110 mg/dL Final   12/10/2022 123 (H) 70 - 110 mg/dL Final   12/10/2022 136 (H) 70 - 110 mg/dL Final   12/10/2022 159 (H) 70 - 110 mg/dL Final   12/10/2022 162 (H) 70 - 110 mg/dL Final   12/10/2022 216 (H) 70 - 110 mg/dL Final   12/10/2022 227 (H) 70 - 110 mg/dL Final   12/10/2022 290 (H) 70 - 110 mg/dL Final   12/10/2022 325 (H) 70 - 110 mg/dL Final   12/10/2022 297 (H) 70 - 110 mg/dL Final   12/10/2022 316 (H) 70 - 110 mg/dL Final   12/09/2022 303 (H) 70 - 110 mg/dL Final   12/09/2022 341 (H) 70 - 110 mg/dL Final   12/09/2022 363 (H) 70 - 110 mg/dL Final   12/09/2022 413 (H) 70 - 110 mg/dL Final   12/09/2022 449 (H) 70 - 110 mg/dL Final   12/09/2022 >500 (H) 70 - 110 mg/dL Final   12/09/2022 >500 (H) 70 - 110 mg/dL Final   12/09/2022 >500 (H) 70 - 110 mg/dL Final   12/09/2022 >500 (H) 70 - 110 mg/dL Final   12/09/2022 >500 (H) 70 - 110 mg/dL Final   12/09/2022 >500 (H) 70 - 110 mg/dL Final     Lab Results   Component Value Date    HGBA1C 6.2 08/24/2022       Pertinent Medications Reviewed: reviewed  Phenergan, epoetin, KCl, insulin, mirtazapine     Estimated/Assessed Needs     Weight Used For Calorie Calculations: 56.3 kg  Energy Calorie Requirements (kcal): 30-35 kcal/kg (HD vs. overweight)= 4208-5527 kcal  Energy Need Method: Kcal/kg  Protein Requirements: 1.2 g protein/kg ( HD) = 67 g  Weight Used For Protein Calculations: 56.3 kg  Fluid Requirements (mL): 1500 ml or per MD HD  Estimated Fluid Requirement Method: other (see comments)  CHO Requirement: 187-229 g        Nutrition Prescription Ordered     Current Diet Order: clear liquid diet     Evaluation of Received Nutrient/Fluid Intake     Energy Calories Required: not meeting needs  Protein Required: not meeting needs  Fluid Required: not meeting needs  Tolerance: pain, decreased appetite    Intake/Output Summary (Last 24 hours) at 12/10/2022 1259  Last  data filed at 12/10/2022 0614  Gross per 24 hour   Intake 0 ml   Output --   Net 0 ml       % Intake of Estimated Energy Needs: 0-5%   % Meal Intake: 0-25% clears     Nutrition Risk     Level of Risk/Frequency of Follow-up: 2 x weekly        Monitor and Evaluation     Food and Nutrient Intake: energy intake, food and beverage intake  Food and Nutrient Adminstration: diet order  Anthropometric Measurements: weight  Biochemical Data, Medical Tests and Procedures: electrolyte and renal panel, gastrointestinal profile, glucose/endocrine profile  Nutrition-Focused Physical Findings: overall appearance         Nutrition Follow-Up     RD Follow-up?: Yes

## 2022-12-10 NOTE — NURSING
Bedside report done with SHAILA Perez and SHAILA Guzman. Pt asleep, no distress noted. Insulin drip infusing.

## 2022-12-10 NOTE — PROGRESS NOTES
12/10/22 0804   Vital Signs   BP (!) 182/113   MAP (mmHg) 142       PRN Labetalol given. Patient d/t to receive HD this AM.

## 2022-12-10 NOTE — ASSESSMENT & PLAN NOTE
Chronic, uncontrolled with 2 seizures night of admission  S/p ativan prn  CT head neg  Continue home keppra  Neurology consulted

## 2022-12-10 NOTE — EICU
Intervention Initiated From:  [facility] / EICU    Roxann intervened regarding:  Rounding (Video assessment)  Comments: Video rounds done.  Resting quietly in bed w/ eyes closed.  BP continues high (243/126), HR 92 (SR), RR 14, POx 99%  Insulin IV drip & IVF in progress (see MAR for rates). Resource RN in room w/ pt & pt's status discussed & stated NP awaiting to see pt's BP response to medications & seizure control.  Encouraged to reach out to eICU if needed.

## 2022-12-10 NOTE — ASSESSMENT & PLAN NOTE
Patient's FSGs are controlled on current medication regimen.  Last A1c reviewed-   Lab Results   Component Value Date    HGBA1C 6.2 08/24/2022     Most recent fingerstick glucose reviewed-   Recent Labs   Lab 12/10/22  1118 12/10/22  1204 12/10/22  1303 12/10/22  1407   POCTGLUCOSE 95 111* 129* 137*     Current correctional scale  Low  Decrease anti-hyperglycemic dose as follows- insulin gtt on hold, starting levemir and LDSSI  Antihyperglycemics (From admission, onward)    Start     Stop Route Frequency Ordered    12/10/22 2100  insulin detemir U-100 pen 9 Units         -- SubQ Nightly 12/10/22 0903    12/09/22 1515  insulin regular 1 Units/mL in sodium chloride 0.9% 100 mL infusion        Question Answer Comment   Insulin Rate Adjustment (DO NOT MODIFY ANSWER) [email or web address]    Enter initial dose from Infusion Protocol Chart (Units/hr): 3        -- IV Continuous 12/09/22 1417        Hold Oral hypoglycemics while patient is in the hospital.  Adjust regimen for goal glucose <180

## 2022-12-10 NOTE — PLAN OF CARE
Latest Reference Range & Units 12/10/22 10:01   POCT Glucose 70 - 110 mg/dL 94     Insulin gtt on hold. Dr. Garcia aware.

## 2022-12-10 NOTE — ASSESSMENT & PLAN NOTE
Noted elevated anion gap, hyperglycemia, and serum ketones  S/p 2L IVFH in ER, held further IVFH due to tenuous volume status with hx HFpEF and ESRD on HD  DKA protocol; insulin gtt held at this time  Levemir 4u given and plan for 9u nightly as well as LDSSI  Monitor lytes and glucose close  CLD started

## 2022-12-10 NOTE — CONSULTS
[facility]  Neurology  Consult Note        PATIENT NAME: Tabby Howard  MRN: [id number]  CSN: [id number]      TODAY'S DATE: 12/10/2022  ADMIT DATE: 12/9/2022                            CONSULTING PROVIDER: Neptali Marley MD  CONSULT REQUESTED BY: Max Garcia MD      Reason for consult: Seizures       History obtained from chart review.    HPI: Tabby Howard is a 32 y.o. female with a history of HTN, IDDM, ESRD on HD, HFpEF, seizure disorder, adjustment disorder, GERD, DVT, and idiopathic abdominal pain presents to the ER due to severe abdominal pain. Associated symptoms include fatigue, nausea, vomiting, chest pain, and back pain. She denies SOB. She reports compliance with her scheduled dialysis with last session yesterday 12/8. She reports adherence to her insulin regimen. Of note, she had a recent hospital stay 11/30-12/7 for abdominal pain, nausea, and vomiting for which no clear etiology of her pain was identified. Found to have lab findings consistent with DKA in ER in addition to her intractable pain.    Patient was admitted to the hospital and was started on insulin drip for diabetic ketoacidosis.  Yesterday while having IV place, patient had 2 episodes of generalized tonic-clonic seizure lasting for less than a minute.  She received a dose of Ativan followed by 1 time dose of IV Keppra 500 mg.  She was continued on her home dose of 500 mg b.i.d. Keppra.    Patient does not remember the episode of seizures yesterday.  She states that she had 1 seizure prior to this and can not tell me how long was the seizure was.  She states that she is on Keppra 500 b.i.d. at home and she thinks she takes this every other day.  Currently she complains of severe abdominal pain and denies vision trouble, nausea, vomiting, weakness or sensory changes in extremities.      PREVIOUS MEDICAL HISTORY:  Past Medical History:   Diagnosis Date    CKD (chronic kidney disease), stage IV 4/12/2022    Diabetes  mellitus due to underlying condition with unspecified complications 2022    Gastroparesis 2022    Heart failure with preserved ejection fraction 2022    EF 55% on 3/22    History of gastroesophageal reflux (GERD)     History of supraventricular tachycardia     Hyperkalemia 2022    Hypertensive emergency 2022    Sickle cell trait 2022    Type 2 diabetes mellitus      PREVIOUS SURGICAL HISTORY:  Past Surgical History:   Procedure Laterality Date     SECTION      x 3    COLONOSCOPY      COLONOSCOPY N/A 2022    Procedure: COLONOSCOPY;  Surgeon: Jagdeep Cedeno MD;  Location: [facility];  Service: Endoscopy;  Laterality: N/A;    ESOPHAGOGASTRODUODENOSCOPY N/A 10/18/2019    Procedure: ESOPHAGOGASTRODUODENOSCOPY (EGD);  Surgeon: Gianluca Mendez MD;  Location: [facility];  Service: Endoscopy;  Laterality: N/A;    ESOPHAGOGASTRODUODENOSCOPY N/A 2022    Procedure: EGD (ESOPHAGOGASTRODUODENOSCOPY);  Surgeon: Micky Paredes III, MD;  Location: [facility];  Service: Endoscopy;  Laterality: N/A;    ESOPHAGOGASTRODUODENOSCOPY N/A 2022    Procedure: EGD (ESOPHAGOGASTRODUODENOSCOPY);  Surgeon: Marcelo Zhong MD;  Location: [facility];  Service: Endoscopy;  Laterality: N/A;    LAPAROSCOPIC CHOLECYSTECTOMY N/A 2022    Procedure: CHOLECYSTECTOMY, LAPAROSCOPIC;  Surgeon: Grey Perez MD;  Location: [facility];  Service: General;  Laterality: N/A;    PLACEMENT OF DUAL-LUMEN VASCULAR CATHETER Left 2022    Procedure: INSERTION-CATHETER-JOSEPH;  Surgeon: Dionte Gan MD;  Location: [facility] OR;  Service: General;  Laterality: Left;    PLACEMENT OF DUAL-LUMEN VASCULAR CATHETER Right 2022    Procedure: INSERTION-CATHETER-Hemosplit;  Surgeon: Dionte Gan MD;  Location: [facility];  Service: General;  Laterality: Right;     FAMILY MEDICAL HISTORY:  Family History   Problem Relation Age of Onset    Diabetes Mother     Diabetes Father      SOCIAL HISTORY:  Social History      Tobacco Use    Smoking status: Never    Smokeless tobacco: Never   Substance Use Topics    Alcohol use: Not Currently    Drug use: No     ALLERGIES:  Review of patient's allergies indicates:   Allergen Reactions    Penicillins Hives     HOME MEDICATIONS:  Prior to Admission medications    Medication Sig Start Date End Date Taking? Authorizing Provider   amLODIPine (NORVASC) 10 MG tablet Take 1 tablet (10 mg total) by mouth once daily. 11/15/22 11/15/23 Yes Светлана Fiore MD   apixaban (ELIQUIS) 5 mg Tab Take 1 tablet (5 mg total) by mouth 2 (two) times daily. 11/15/22 2/13/23 Yes Светлана Fiore MD   carvediloL (COREG) 25 MG tablet Take 1 tablet (25 mg total) by mouth 2 (two) times daily. 11/15/22 2/13/23 Yes Светлана Fiore MD   cloNIDine 0.2 mg/24 hr td ptwk (CATAPRES) 0.2 mg/24 hr Place 1 patch onto the skin every 7 days. 11/15/22 11/15/23 Yes Светлана Fiore MD   dicyclomine (BENTYL) 10 MG capsule Take 1 capsule (10 mg total) by mouth every 8 (eight) hours as needed (abdominal pain). 12/7/22  Yes Max Garcia MD   FLUoxetine 20 MG capsule Take 1 capsule (20 mg total) by mouth once daily. 11/15/22 11/15/23 Yes Светлана Fiore MD   hydrALAZINE (APRESOLINE) 100 MG tablet Take 1 tablet (100 mg total) by mouth every 8 (eight) hours. 11/15/22 2/13/23 Yes Светлана Fiore MD   insulin aspart U-100 (NOVOLOG) 100 unit/mL (3 mL) InPn pen Inject 1-10 Units into the skin before meals and at bedtime as needed (Hyperglycemia). Max daily dose 40 units. 11/15/22  Yes Светлана Fiore MD   insulin detemir U-100 (LEVEMIR FLEXTOUCH) 100 unit/mL (3 mL) SubQ InPn pen Inject 9 Units into the skin once daily. 12/7/22  Yes Max Garcia MD   isosorbide mononitrate (IMDUR) 60 MG 24 hr tablet Take 2 tablets (120 mg total) by mouth once daily. 11/15/22 11/15/23 Yes Светлана Fiore MD   levETIRAcetam (KEPPRA) 500 MG Tab Take 1 tablet (500 mg total) by mouth 2 (two) times daily. 11/15/22 11/15/23 Yes Светлана Fiore,  "MD   mirtazapine (REMERON SOL-TAB) 15 MG disintegrating tablet Take 1 tablet (15 mg total) by mouth nightly. 12/7/22  Yes Max Garcia MD   ondansetron (ZOFRAN) 4 MG tablet Take 1 tablet (4 mg total) by mouth every 8 (eight) hours as needed for Nausea. 11/15/22  Yes Светлана Fiore MD   promethazine (PHENERGAN) 12.5 MG Tab Take 1 tablet (12.5 mg total) by mouth every 12 (twelve) hours as needed (nausea/vomiting not relieved with ondansetron (zofran)). 12/7/22  Yes Max Garcia MD   traMADoL (ULTRAM) 50 mg tablet Take 1 tablet (50 mg total) by mouth every 8 (eight) hours as needed for Pain. 12/7/22  Yes Max Garcia MD   albuterol (PROVENTIL/VENTOLIN HFA) 90 mcg/actuation inhaler Inhale 2 puffs into the lungs every 6 (six) hours as needed for Wheezing. Rescue 11/15/22   Светлана Fiore MD   gabapentin (NEURONTIN) 100 MG capsule Take 1 capsule (100 mg total) by mouth 2 (two) times daily. 11/15/22 11/15/23  Светлана Fiore MD   methocarbamoL (ROBAXIN) 500 MG Tab Take 1 tablet (500 mg total) by mouth 3 (three) times daily. 11/15/22   Светлана Fiore MD   pantoprazole (PROTONIX) 40 MG tablet Take 40 mg by mouth once daily. 11/8/22   Historical Provider   pen needle, diabetic (BD ULTRA-FINE MAGALIE PEN NEEDLE) 32 gauge x 5/32" Ndle Inject into the skin 5 times per day with insulin 11/15/22   Светлана Fiore MD   atenoloL (TENORMIN) 50 MG tablet Take 1 tablet (50 mg total) by mouth every other day. 7/17/22 7/27/22  Keegan Cody MD   omeprazole (PRILOSEC) 20 MG capsule Take 2 capsules (40 mg total) by mouth once daily. for 10 days 8/9/22 8/26/22  Kaushik Patton MD   torsemide (DEMADEX) 20 MG Tab Take 1 tablet (20 mg total) by mouth 2 (two) times a day.  Patient taking differently: Take 20 mg by mouth once daily. 6/11/22 7/15/22  Gurmeet Barrera MD     CURRENT SCHEDULED MEDICATIONS:   amLODIPine  10 mg Oral Daily    apixaban  2.5 mg Oral BID    carvediloL  25 mg Oral BID    cloNIDine 0.2 mg/24 " hr td ptwk  1 patch Transdermal Q7 Days    dicyclomine  10 mg Oral TID    epoetin teresa (PROCRIT) injection  50 Units/kg Subcutaneous Once    FLUoxetine  20 mg Oral QHS    gabapentin  100 mg Oral BID    hydrALAZINE  100 mg Oral Q8H    insulin detemir U-100  9 Units Subcutaneous QHS    isosorbide mononitrate  120 mg Oral Daily    levETIRAcetam  500 mg Oral BID    mirtazapine  15 mg Oral QHS    mupirocin   Nasal BID    pantoprazole  40 mg Intravenous Daily    potassium chloride  10 mEq Intravenous Q2H     CURRENT INFUSIONS:   dextrose 5 % and 0.9 % NaCl 75 mL/hr at 12/10/22 0421    insulin regular 1 units/mL infusion orderable (DKA) Stopped (12/10/22 1000)     CURRENT PRN MEDICATIONS:  acetaminophen, dextrose 10%, dextrose 10%, dextrose 5 % and 0.9 % NaCl, hydrALAZINE, HYDROmorphone, insulin aspart U-100, labetaloL, lorazepam, ondansetron, promethazine (PHENERGAN) IVPB, sodium chloride 0.9%, sodium chloride 0.9%, traMADoL    REVIEW OF SYSTEMS:  Please refer to the HPI for all pertinent positive and negative findings. A comprehensive review of all other systems was negative.       PHYSICAL EXAM:  Patient Vitals for the past 24 hrs:   BP Temp Temp src Pulse Resp SpO2 Height Weight   12/10/22 1401 135/89 -- -- 84 13 99 % -- --   12/10/22 1330 (!) 145/88 98.1 °F (36.7 °C) Oral 86 19 98 % -- --   12/10/22 1314 (!) 147/92 -- -- 91 (!) 23 98 % -- --   12/10/22 1302 113/80 -- -- 81 12 98 % -- --   12/10/22 1245 (!) 124/92 -- -- 81 15 -- -- --   12/10/22 1217 118/87 98.2 °F (36.8 °C) -- 82 13 97 % -- --   12/10/22 1145 127/88 -- -- 82 15 98 % -- --   12/10/22 1131 (!) 123/90 -- -- 81 13 (!) 91 % -- --   12/10/22 1117 (!) 131/90 -- -- 83 12 98 % -- --   12/10/22 1101 128/88 -- -- 83 13 97 % -- --   12/10/22 1047 (!) 140/100 -- -- 83 13 97 % -- --   12/10/22 1031 (!) 157/99 -- -- 84 13 97 % -- --   12/10/22 1021 (!) 172/109 -- -- 87 12 96 % -- --   12/10/22 1017 (!) 174/113 -- -- 89 14 95 % -- --   12/10/22 1015 (!) 172/109 -- --  "86 16 98 % -- --   12/10/22 1012 (!) 172/115 -- -- 91 17 99 % -- --   12/10/22 1002 (!) 168/109 -- -- 91 17 98 % -- --   12/10/22 1001 (!) 168/105 -- -- 91 19 98 % -- --   12/10/22 1000 (!) 174/113 97.3 °F (36.3 °C) Oral 90 15 (!) 94 % -- --   12/10/22 0906 -- -- -- -- 20 -- -- --   12/10/22 0902 (!) 171/110 -- -- 92 18 (!) 92 % -- --   12/10/22 0832 (!) 171/109 -- -- 92 16 (!) 94 % -- --   12/10/22 0804 (!) 182/113 97.1 °F (36.2 °C) -- 92 17 96 % -- --   12/10/22 0715 -- -- -- 90 17 96 % -- --   12/10/22 0400 (!) 181/112 97.8 °F (36.6 °C) Axillary 93 20 98 % -- --   12/10/22 0350 -- -- -- -- 20 -- -- --   12/10/22 0243 (!) 192/124 -- -- -- -- -- -- --   12/10/22 0200 (!) 181/111 -- -- 94 (!) 21 98 % -- --   12/10/22 0012 (!) 189/118 98 °F (36.7 °C) Tympanic 90 (!) 22 95 % -- --   12/09/22 2300 (!) 193/119 -- -- 91 17 98 % -- --   12/09/22 2230 (!) 233/122 -- -- 89 (!) 21 96 % -- --   12/09/22 2200 (!) 229/118 97.4 °F (36.3 °C) Axillary 87 20 96 % -- --   12/09/22 2130 (!) 230/123 -- -- 88 18 99 % -- --   12/09/22 2100 (!) 243/126 -- -- 92 19 98 % -- --   12/09/22 2030 (!) 258/141 -- -- 92 (!) 25 96 % -- --   12/09/22 2028 (!) 254/135 -- -- -- -- -- -- --   12/09/22 2000 -- -- -- 91 (!) 31 98 % -- --   12/09/22 1810 -- 97.6 °F (36.4 °C) Oral -- -- -- 5' 2" (1.575 m) 56.3 kg (124 lb 1.9 oz)   12/09/22 1800 (!) 199/117 -- -- 87 (!) 28 96 % -- --   12/09/22 1700 (!) 189/100 -- -- 85 (!) 21 (!) 90 % -- --   12/09/22 1636 -- -- -- -- (!) 22 -- -- --   12/09/22 1630 (!) 199/114 -- -- 90 -- 96 % -- --   12/09/22 1615 (!) 202/123 -- -- 91 -- 98 % -- --   12/09/22 1502 (!) 224/119 -- -- 88 -- (!) 94 % -- --   12/09/22 1438 (!) 210/119 -- -- -- -- -- -- --       GENERAL APPEARANCE: Alert, well-developed, well-nourished female in moderate distress.  HEENT: Normocephalic and atraumatic. PERRL. Oropharynx unremarkable.  PULM: Normal respiratory effort. No accessory muscle use.  CV: RRR.  ABDOMEN: Soft, nontender.  EXTREMITIES: " No obvious signs of vascular compromise. Pulses present. No cyanosis, clubbing or edema.  SKIN: Clear; no rashes, lesions or skin breaks in exposed areas.    NEURO:  MENTAL STATUS: Patient awake and oriented to time, place, and person, attention span/concentration normal, and speech fluent without paraphasic errors.  Affect euthymic.    CRANIAL NERVES:  CN I: Not tested.  CN II: Fundoscopic exam deferred.  CN III, IV, VI: Pupils equal, round and reactive to light.  Extraocular movements full and intact.  CN V: Facial sensation normal.  CN VII: Facial asymmetry absent.  CN VIII: Hearing grossly normal and equal bilaterally.  No skew deviation or pathologic nystagmus.  CN IX, X: Palate elevates symmetrically. Speech/articulation is clear without dysarthria.  CN XI: Shoulder shrug and chin rotation equal with good strength.  CN XII: Tongue protrusion midline.    MOTOR:  Bulk normal. Tone normal and symmetric throughout.  Abnormal movements absent.  Tremor: none present.  Strength 4/5 throughout except/unless specified below:.    REFLEXES:  DTRs 2+ throughout.  Plantar response equivocal bilaterally.  SENSATION:grossly intact throughout.  COORDINATION: normal finger-to-nose.  STATION: not tested.  GAIT: not tested.  Labs:  Recent Labs   Lab 12/04/22  0843 12/05/22  0813 12/09/22  2254 12/10/22  0446 12/10/22  0955      < > 136 139 139   K 3.8   < > 4.2 3.4* 3.4*      < > 99 101 103   CO2 25   < > 22* 24 24   BUN 15   < > 53* 55* 55*   CREATININE 3.3*   < > 3.7* 3.9* 3.9*   *   < > 350* 207* 114*   CALCIUM 8.8   < > 9.3 9.0 8.6*   PHOS  --   --   --  4.5  --    MG 1.9  --   --  1.9  --     < > = values in this interval not displayed.     Recent Labs   Lab 12/07/22  0525 12/09/22  0955 12/10/22  0446   WBC 6.95 7.25 10.24   HGB 10.7* 10.5* 9.3*   HCT 31.6* 30.5* 27.1*   PLT 90* 63* 75*     Recent Labs   Lab 12/06/22  0547 12/07/22  0525 12/09/22  0955   ALBUMIN 2.8* 2.8* 3.7   PROT 6.0 6.5 8.4   BILITOT  1.4* 1.9* 3.3*   ALKPHOS 307* 319* 377*   ALT 44 41 38   AST 27 23 33     Lab Results   Component Value Date    INR 1.0 10/26/2022     No results found for: CHOLTOT, TRIG, HDL, CHOLHDL, LDL  Lab Results   Component Value Date    HGBA1C 6.2 08/24/2022     No results found for: PROTEINCSF, GLUCCSF, WBCCSF    Imaging:  I have reviewed and interpreted the pertinent imaging and lab results.      CT Head Without Contrast  Narrative: EXAMINATION:  CT HEAD WITHOUT CONTRAST    CLINICAL HISTORY:  Seizure disorder, clinical change;seizure in DKA with extreme HTN;    TECHNIQUE:  Low dose axial images were obtained through the head.  Coronal and sagittal reformations were also performed. Contrast was not administered.    COMPARISON:  Head CT from the previous month.    FINDINGS:  Motion artifact is present, limiting detail.  The ventricles and basilar cisterns remain patent.  No shift of midline.  No bleed or extra-axial collection.  Normal gray-white matter differentiation is maintained.  Orbits and orbital contents are maintained.  Skull remains intact.  Left greater than right maxillary sinusitis changes are seen.  Small bilateral mastoid effusions are also present.  No acute abnormality.  Should symptoms persist, further evaluation/MRI could be performed as warranted.  Impression: No acute intracranial abnormality or interval change since 11/07/2022 within limitations of the exam.  Maxillary sinusitis.  Small right greater than left mastoid effusions.    Electronically signed by: Jalen Jin MD  Date:    12/10/2022  Time:    10:34         ASSESSMENT & PLAN:    Breakthrough seizure  Diabetic ketoacidosis   Abdominal pain    Plan:  Etiology of breakthrough seizure is unknown however could be in the setting of metabolic derangements including diabetic ketoacidosis.    Patient is maintained on Keppra 500 mg b.i.d. at home and recommend continuing this medication.  Patient will need additional dose of 500 mg Keppra after every  dialysis session.  CT head was done showed no acute intracranial abnormality  EEG ordered and pending  Seizure precautions.  Avoid medications that lower seizure threshold.  Management of diabetic ketosis and abdominal pain per primary team.  Will follow      Seizure education.      No driving for now, no swimming alone, no climbing high areas, no operation of heavy machinery or working with high risk electricity equipment.    Continue to take AEDs as prescribed. If any major side effects from neurological medications go to the ED including mood changes and severe depression.    Follow up Neurology in 2 weeks. Call [phone] to make an appointment.    Medication side effects discussed with the patient and/or caregiver.      Thank you kindly for including us in the care of this patient. Please do not hesitate to contact us with any questions.        48 minutes of care time has been spent evaluating with the patient. Time includes chart review not limited to diagnostic imaging, labs, and vitals, patient assessment, discussion with family and nursing, current order evaluations, and new order entries.       Neptali Marley MD  Neurology/vascular Neurology  Date of Service: 12/10/2022  2:37 PM        Please note: This note was transcribed using voice recognition software. Because of this technology there are often uinintended grammatical, spelling, and other transcription errors. Please disregard these errors.

## 2022-12-10 NOTE — CARE UPDATE
12/10/22 0715   Patient Assessment/Suction   Level of Consciousness (AVPU) responds to pain   PRE-TX-O2   Device (Oxygen Therapy) nasal cannula   $ Is the patient on Low Flow Oxygen? Yes   Flow (L/min) 1   SpO2 96 %   Pulse Oximetry Type Continuous   $ Pulse Oximetry - Multiple Charge Pulse Oximetry - Multiple   Pulse 90   Resp 17

## 2022-12-10 NOTE — CONSULTS
"  Consult Note  Nephrology    Consult Requested By: Max Garcia MD    Reason for Consult:  ESRD, DM, HTN, SHPT, anemia    SUBJECTIVE:     History of Present Illness:   31 y/o female patient known to our practice, has out patient dialysis 3x wk on TTS schedule.  Of note, she had a recent hospital stay - for abdominal pain, nausea, and vomiting for which no clear etiology of her pain was identified. Found to have lab findings consistent with DKA in ER in addition to her intractable pain. Nephrology is consulted for dialysis orders.    12/10  pressures are high, has HD orders for today, fluid removal will help.  Hypokalemic, will correct w/HD.  Pt unable to sign dialysis consent, just had dilaudid and says, "I can't really see".  I signed consent,  witnessed by bedside RN.  C/o abd pain.    Assessment/plan:    ESRD  Hypokalemia  SHPT  Anemia of chronic dz  DM    --HD per pt's schedule, TTS.  Dose all medications for dialysis  --3K bath  --PO4 at goal  --epo w/HD  --Blood glucose control per primary team.        Past Medical History:   Diagnosis Date    CKD (chronic kidney disease), stage IV 2022    Diabetes mellitus due to underlying condition with unspecified complications 2022    Gastroparesis 2022    Heart failure with preserved ejection fraction 2022    EF 55% on 3/22    History of gastroesophageal reflux (GERD)     History of supraventricular tachycardia     Hyperkalemia 2022    Hypertensive emergency 2022    Sickle cell trait 2022    Type 2 diabetes mellitus      Past Surgical History:   Procedure Laterality Date     SECTION      x 3    COLONOSCOPY      COLONOSCOPY N/A 2022    Procedure: COLONOSCOPY;  Surgeon: Jagdeep Cedeno MD;  [facility];  Service: Endoscopy;  Laterality: N/A;    ESOPHAGOGASTRODUODENOSCOPY N/A 10/18/2019    Procedure: ESOPHAGOGASTRODUODENOSCOPY (EGD);  Surgeon: Gianluca Mendez MD;  Location: [facility];  Service: Endoscopy;  " Laterality: N/A;    ESOPHAGOGASTRODUODENOSCOPY N/A 08/24/2022    Procedure: EGD (ESOPHAGOGASTRODUODENOSCOPY);  Surgeon: Micky Paredes III, MD;  Location: [facility];  Service: Endoscopy;  Laterality: N/A;    ESOPHAGOGASTRODUODENOSCOPY N/A 12/5/2022    Procedure: EGD (ESOPHAGOGASTRODUODENOSCOPY);  Surgeon: Marcelo Zhong MD;  Location: [facility] ENDO;  Service: Endoscopy;  Laterality: N/A;    LAPAROSCOPIC CHOLECYSTECTOMY N/A 07/30/2022    Procedure: CHOLECYSTECTOMY, LAPAROSCOPIC;  Surgeon: Grey Perez MD;  Location: [facility] OR;  Service: General;  Laterality: N/A;    PLACEMENT OF DUAL-LUMEN VASCULAR CATHETER Left 07/12/2022    Procedure: INSERTION-CATHETER-JOSEPH;  Surgeon: Dionte Gan MD;  Location: [facility] OR;  Service: General;  Laterality: Left;    PLACEMENT OF DUAL-LUMEN VASCULAR CATHETER Right 07/26/2022    Procedure: INSERTION-CATHETER-Hemosplit;  Surgeon: Dionte Gan MD;  Location: [facility] OR;  Service: General;  Laterality: Right;     Family History   Problem Relation Age of Onset    Diabetes Mother     Diabetes Father      Social History     Tobacco Use    Smoking status: Never    Smokeless tobacco: Never   Substance Use Topics    Alcohol use: Not Currently    Drug use: No       Review of patient's allergies indicates:   Allergen Reactions    Penicillins Hives        Review of Systems:  Negative unless noted above.    OBJECTIVE:     Vital Signs Range (Last 24H):  Temp:  [97.1 °F (36.2 °C)-98 °F (36.7 °C)]   Pulse:  []   Resp:  [17-31]   BP: (181-258)/(100-141)   SpO2:  [90 %-100 %]     Physical Exam:  General- NAD noted  HEENT- WNL  Neck- supple  CV- Regular rate and rhythm  Resp- Lungs CTA Bilaterally, No increased WOB  GI- Non tender/non-distended, BS normoactive x4 quads  Extrem- No cyanosis, clubbing, edema.  Derm- skin w/d  Neuro-  No flap.     Body mass index is 22.7 kg/m².    Laboratory:  CBC:   Recent Labs   Lab 12/10/22  0446   WBC 10.24   RBC 3.35*   HGB 9.3*   HCT 27.1*   PLT 75*    MCV 81*   MCH 27.8   MCHC 34.3     CMP:   Recent Labs   Lab 12/09/22  0955 12/09/22  1513 12/10/22  0446   *   < > 207*   CALCIUM 9.7   < > 9.0   ALBUMIN 3.7  --   --    PROT 8.4  --   --    *   < > 139   K 4.7   < > 3.4*   CO2 21*   < > 24   CL 90*   < > 101   BUN 42*   < > 55*   CREATININE 3.4*   < > 3.9*   ALKPHOS 377*  --   --    ALT 38  --   --    AST 33  --   --    BILITOT 3.3*  --   --     < > = values in this interval not displayed.       Diagnostic Results:  Labs: Reviewed      ASSESSMENT/PLAN:     Active Hospital Problems    Diagnosis  POA    *Diabetic ketoacidosis without coma associated with type 2 diabetes mellitus [E11.10]  Yes    Adjustment disorder [F43.20]  Yes    Intractable generalized abdominal pain [R10.84]  Yes    Anemia of chronic kidney failure, stage 5 [N18.5, D63.1]  Yes     Chronic    Thrombocytopenia [D69.6]  Yes     Chronic    Hypertension [I10]  Yes     Chronic    Deep vein thrombosis (DVT) of non-extremity vein [I82.90]  Yes     Chronic    ESRD (end stage renal disease) on dialysis [N18.6, Z99.2]  Not Applicable     Chronic    Seizure disorder [G40.909]  Yes     Chronic    Type 2 diabetes mellitus with chronic kidney disease on chronic dialysis, with long-term current use of insulin [E11.22, N18.6, Z99.2, Z79.4]  Not Applicable     Chronic    Gastritis [K29.70]  Yes      Resolved Hospital Problems   No resolved problems to display.         Thank you for allowing us to participate in the care of your patient. We will follow the patient and provide recommendations as needed.      Time spent seeing patient( greater than 1/2 spent in direct contact) :     Geeta Kaur NP

## 2022-12-10 NOTE — EICU
Video rounding completed.  Pt asleep lying on left side.  Infusing D5NS at75 cc/hr & Insulin gtt at 0.9 U/hr.  B/P 164/108, HR 90, RR 17, Pox 96% on nasal cannula.

## 2022-12-10 NOTE — PLAN OF CARE
"Pt crying in bed, talking about her stomach hurting, has been seen multiple times inserting her hand into her mouth and down her throat, when asked why, responds with "it hurts". When educated on why placing her hand down her throat is not a good thing, states "ok". Medications given as ordered, safety maintained, hourly accuchecks performed, continuing to monitor.  "

## 2022-12-10 NOTE — SUBJECTIVE & OBJECTIVE
Interval History: Patient seen and examined. Had severe HTN overnight with seizures episodes x2. Improved this AM still with abd pain. Insulin gtt on hold. Preparing for dialysis at time of my assessment.    Review of Systems   Constitutional:  Negative for chills and fever.   Respiratory:  Negative for shortness of breath.    Cardiovascular:  Negative for chest pain.   Gastrointestinal:  Positive for abdominal pain and nausea. Negative for constipation, diarrhea and vomiting.   Neurological:  Positive for seizures. Negative for weakness and numbness.   Objective:     Vital Signs (Most Recent):  Temp: 98.1 °F (36.7 °C) (12/10/22 1330)  Pulse: 86 (12/10/22 1330)  Resp: 19 (12/10/22 1330)  BP: (!) 145/88 (12/10/22 1330)  SpO2: 98 % (12/10/22 1330)   Vital Signs (24h Range):  Temp:  [97.1 °F (36.2 °C)-98.2 °F (36.8 °C)] 98.1 °F (36.7 °C)  Pulse:  [81-94] 86  Resp:  [12-31] 19  SpO2:  [90 %-99 %] 98 %  BP: (113-258)/() 145/88     Weight: 56.3 kg (124 lb 1.9 oz)  Body mass index is 22.7 kg/m².    Intake/Output Summary (Last 24 hours) at 12/10/2022 1417  Last data filed at 12/10/2022 1330  Gross per 24 hour   Intake 0 ml   Output 2500 ml   Net -2500 ml      Physical Exam  Vitals reviewed.   Constitutional:       General: She is not in acute distress.     Appearance: She is not ill-appearing.   HENT:      Head: Normocephalic and atraumatic.   Cardiovascular:      Rate and Rhythm: Normal rate and regular rhythm.      Pulses: Normal pulses.   Pulmonary:      Effort: Pulmonary effort is normal. No respiratory distress.      Breath sounds: Normal breath sounds. No wheezing, rhonchi or rales.   Abdominal:      General: Abdomen is flat. Bowel sounds are normal. There is no distension.      Palpations: Abdomen is soft.      Tenderness: There is generalized abdominal tenderness (pain only minimally exacerbated by palpation). There is no guarding or rebound.   Musculoskeletal:         General: No swelling, deformity or signs  of injury.   Skin:     General: Skin is warm and dry.      Findings: No rash.   Neurological:      General: No focal deficit present.      Mental Status: She is alert and oriented to person, place, and time.      Cranial Nerves: No cranial nerve deficit.      Motor: No weakness.   Psychiatric:         Behavior: Behavior is cooperative.      Comments: Uncomfortable affect       Significant Labs: All pertinent labs within the past 24 hours have been reviewed.    Significant Imaging: I have reviewed all pertinent imaging results/findings within the past 24 hours.

## 2022-12-10 NOTE — PROGRESS NOTES
31 yo female w/ PMHx HTN, DM, Seizure ds here in DKA.     Now noted to have active seizures, self resolved.   BP 240s/ 120s.   Pt is well known to the bedside staff.     Asked for   STAT IV Hydralazine 20mg q4h PRN for SBP> 165  IV Ativan 2mg q1h PRN seizures.     Aspiration and seizure precautions.     Pt is already on IV keppra.     Asked to call the bedside staff re further management including moving pt from step down to ICU level of care.   May also suggest neuro imaging given seizure while pt was extremely hypertensive.   F/up focal neurological deficits and resolution from post ictal state.     Would have a low threshold for intubation.     D/w bedside RN in detail.

## 2022-12-10 NOTE — NURSING
Alerted by Midline nurse to pt having possible seizure, came to bedside to witness ending of seizure activity, pt stiff, jerking around in bed, eyes rolled back in head. Frothy blood came from pt mouth, suction initiated, pt sounding course with breathing where she didn't before. Attempted to call NP, got in touch with resource nurse.   1958Resource nurse arrived at bedside,   2009 called eICU, received orders, meds given as ordered   2020 NP to bedside, more orders received and initiated  2032 pt seized again lasted approx 75 seconds, turned to side, suctioned, meds finished infusing

## 2022-12-10 NOTE — ASSESSMENT & PLAN NOTE
Acute on chronic  Has had multiple admissions for this without clear etiology  Likely some aspect of gastritis, gastroparesis, and/or other dysmotility from chronic conditions  There has been prior concern for pain med seeking  Trop neg  Scheduled bentyl  Pain meds prn, antiemetics prn

## 2022-12-10 NOTE — PLAN OF CARE
Recommendations  1) Advance PO diet to DM 1500 kcal, renal diet as pt tolerates  2) Add novasource BID   3) weigh s/p HD   4) Nutrition education review at f/u, handouts and verbal education given 12/10/22  5) Continue appetite stimulant     Goals: 1) PO intakes > 50% of meals and supplements on solids at f/u  Nutrition Goal Status: new  Communication of RD Recs:  (POC, sticky note, reviewed with RN)

## 2022-12-10 NOTE — ASSESSMENT & PLAN NOTE
Patient's anemia is currently controlled. Has not received any PRBCs to date this admission. Etiology likely d/t chronic disease.  Current CBC reviewed-   Lab Results   Component Value Date    HGB 9.3 (L) 12/10/2022    HCT 27.1 (L) 12/10/2022     Monitor serial CBC and transfuse if patient becomes hemodynamically unstable, symptomatic or H/H drops below 7/21.

## 2022-12-10 NOTE — PROGRESS NOTES
[facility] Medicine  Progress Note    Patient Name: Tabby Howard  MRN: [id number]  Patient Class: IP- Inpatient   Admission Date: 12/9/2022  Length of Stay: 1 days  Attending Physician: Max Garcia MD  Primary Care Provider: [facility]        Subjective:     Principal Problem:Diabetic ketoacidosis without coma associated with type 2 diabetes mellitus        HPI:  32F with PMH HTN, IDDM, ESRD on HD, HFpEF, seizure disorder, adjustment disorder, GERD, DVT, and idiopathic abdominal pain presents to the ER due to severe abdominal pain. Associated symptoms include fatigue, nausea, vomiting, chest pain, and back pain. She denies SOB. She reports compliance with her scheduled dialysis with last session yesterday 12/8. She reports adherence to her insulin regimen. Of note, she had a recent hospital stay 11/30-12/7 for abdominal pain, nausea, and vomiting for which no clear etiology of her pain was identified. Found to have lab findings consistent with DKA in ER in addition to her intractable pain. Admitted to hospital medicine for further management.      Overview/Hospital Course:  ESRD diabetic patient well-known to service re-admitted with abdominal pain, nausea, and vomiting found to be in DKA. Placed on insulin gtt and DKA protocol initiated. Nephrology consulted to manage dialysis. Had uncontrolled HTN night of admission with witnessed seizures aborted spontaneously and with ativan. Already on keppra. Neurology consulted. CT head w/o without concerning findings.      Interval History: Patient seen and examined. Had severe HTN overnight with seizures episodes x2. Improved this AM still with abd pain. Insulin gtt on hold. Preparing for dialysis at time of my assessment.    Review of Systems   Constitutional:  Negative for chills and fever.   Respiratory:  Negative for shortness of breath.    Cardiovascular:  Negative for chest pain.   Gastrointestinal:   Positive for abdominal pain and nausea. Negative for constipation, diarrhea and vomiting.   Neurological:  Positive for seizures. Negative for weakness and numbness.   Objective:     Vital Signs (Most Recent):  Temp: 98.1 °F (36.7 °C) (12/10/22 1330)  Pulse: 86 (12/10/22 1330)  Resp: 19 (12/10/22 1330)  BP: (!) 145/88 (12/10/22 1330)  SpO2: 98 % (12/10/22 1330)   Vital Signs (24h Range):  Temp:  [97.1 °F (36.2 °C)-98.2 °F (36.8 °C)] 98.1 °F (36.7 °C)  Pulse:  [81-94] 86  Resp:  [12-31] 19  SpO2:  [90 %-99 %] 98 %  BP: (113-258)/() 145/88     Weight: 56.3 kg (124 lb 1.9 oz)  Body mass index is 22.7 kg/m².    Intake/Output Summary (Last 24 hours) at 12/10/2022 1417  Last data filed at 12/10/2022 1330  Gross per 24 hour   Intake 0 ml   Output 2500 ml   Net -2500 ml      Physical Exam  Vitals reviewed.   Constitutional:       General: She is not in acute distress.     Appearance: She is not ill-appearing.   HENT:      Head: Normocephalic and atraumatic.   Cardiovascular:      Rate and Rhythm: Normal rate and regular rhythm.      Pulses: Normal pulses.   Pulmonary:      Effort: Pulmonary effort is normal. No respiratory distress.      Breath sounds: Normal breath sounds. No wheezing, rhonchi or rales.   Abdominal:      General: Abdomen is flat. Bowel sounds are normal. There is no distension.      Palpations: Abdomen is soft.      Tenderness: There is generalized abdominal tenderness (pain only minimally exacerbated by palpation). There is no guarding or rebound.   Musculoskeletal:         General: No swelling, deformity or signs of injury.   Skin:     General: Skin is warm and dry.      Findings: No rash.   Neurological:      General: No focal deficit present.      Mental Status: She is alert and oriented to person, place, and time.      Cranial Nerves: No cranial nerve deficit.      Motor: No weakness.   Psychiatric:         Behavior: Behavior is cooperative.      Comments: Uncomfortable affect       Significant  Labs: All pertinent labs within the past 24 hours have been reviewed.    Significant Imaging: I have reviewed all pertinent imaging results/findings within the past 24 hours.      Assessment/Plan:      * Diabetic ketoacidosis without coma associated with type 2 diabetes mellitus  Noted elevated anion gap, hyperglycemia, and serum ketones  S/p 2L IVFH in ER, held further IVFH due to tenuous volume status with hx HFpEF and ESRD on HD  DKA protocol; insulin gtt held at this time  Levemir 4u given and plan for 9u nightly as well as LDSSI  Monitor lytes and glucose close  CLD started    Intractable generalized abdominal pain  Acute on chronic  Has had multiple admissions for this without clear etiology  Likely some aspect of gastritis, gastroparesis, and/or other dysmotility from chronic conditions  There has been prior concern for pain med seeking  Trop neg  Scheduled bentyl  Pain meds prn, antiemetics prn    Adjustment disorder  Recently assessed by psychiatry MD consult last admission  Continue home fluoxetine and mirtazapine    Anemia of chronic kidney failure, stage 5  Patient's anemia is currently controlled. Has not received any PRBCs to date this admission. Etiology likely d/t chronic disease.  Current CBC reviewed-   Lab Results   Component Value Date    HGB 9.3 (L) 12/10/2022    HCT 27.1 (L) 12/10/2022     Monitor serial CBC and transfuse if patient becomes hemodynamically unstable, symptomatic or H/H drops below 7/21.     Thrombocytopenia  Chronic, stable  No sign of bleeding  Continue to monitor and transfuse if needed    Hypertension  Chronic, uncontrolled overnight now controlled  Continue home meds amlodipine, coreg, hydralazine, clonidine patch  IV labetalol and hydralazine prn  Adjust regimen as needed    Deep vein thrombosis (DVT) of non-extremity vein  Noted venous LUE 7/24/22: Partially occlusive thrombus within the left internal jugular and cephalic veins.  Continue home eliquis     ESRD (end stage  renal disease) on dialysis  Chronic  Nephro managing dialysis    Seizure disorder  Chronic, uncontrolled with 2 seizures night of admission  S/p ativan prn  CT head neg  Continue home keppra  Neurology consulted    Type 2 diabetes mellitus with chronic kidney disease on chronic dialysis, with long-term current use of insulin  Patient's FSGs are controlled on current medication regimen.  Last A1c reviewed-   Lab Results   Component Value Date    HGBA1C 6.2 08/24/2022     Most recent fingerstick glucose reviewed-   Recent Labs   Lab 12/10/22  1118 12/10/22  1204 12/10/22  1303 12/10/22  1407   POCTGLUCOSE 95 111* 129* 137*     Current correctional scale  Low  Decrease anti-hyperglycemic dose as follows- insulin gtt on hold, starting levemir and LDSSI  Antihyperglycemics (From admission, onward)      Start     Stop Route Frequency Ordered    12/10/22 2100  insulin detemir U-100 pen 9 Units         -- SubQ Nightly 12/10/22 0903    12/09/22 1515  insulin regular 1 Units/mL in sodium chloride 0.9% 100 mL infusion        Question Answer Comment   Insulin Rate Adjustment (DO NOT MODIFY ANSWER) \\[email or web address]    Enter initial dose from Infusion Protocol Chart (Units/hr): 3        -- IV Continuous 12/09/22 1417          Hold Oral hypoglycemics while patient is in the hospital.  Adjust regimen for goal glucose <180    Gastritis  Noted, chronic  Had EGD 12/5 consistent with this, no ulcers or other severe findings  Continue ppi      VTE Risk Mitigation (From admission, onward)           Ordered     apixaban tablet 2.5 mg  2 times daily         12/09/22 1417     IP VTE HIGH RISK PATIENT  Once         12/09/22 1417                    Discharge Planning   TATIANA:  unclear    Code Status: Full Code   Is the patient medically ready for discharge?:     Reason for patient still in hospital (select all that apply): Patient new problem, Patient trending condition, Laboratory test,  Treatment and Consult recommendations             Critical care time spent on the evaluation and treatment of severe organ dysfunction, review of pertinent labs and imaging studies, discussions with consulting providers and discussions with patient/family: 35 minutes.       Max Garcia MD  Department of Hospital Medicine   [facility]

## 2022-12-10 NOTE — HOSPITAL COURSE
ESRD diabetic patient well-known to service re-admitted with abdominal pain, nausea, and vomiting found to be in DKA. Placed on insulin gtt and DKA protocol initiated. Nephrology consulted to manage dialysis. Had uncontrolled HTN night of admission with witnessed seizures aborted spontaneously and with ativan. Already on keppra. Neurology consulted. CT head w/o without concerning findings.    Patient doesn't participate in much conversation. Tolerating clear liquids, will try to advance and transfer out of step down unit.   12/12- overnight, patient asking for more pain meds and more to eat. Seems to be tolerating diabetic diet.  EEG pending. Drop in h/h, may need blood with HD tomorrow if continues to drop.  12/13- HD today, patient c/o abdominal pain and vomiting before HD; will plan for dc tomorrow because pain seems to be improved   12/14- patient will dc home today; prescription for tramadol and phenergan sent to [facility] pharmacy; counseled on compliance with insulin as to not put herself into DKA; SW discussed her mental health state and needing outpatient resources to help manage her depression and try to improve her state of health

## 2022-12-10 NOTE — PLAN OF CARE
CM attempted to complete DC assessment. Pt sleeping, on HD. LM for pt's dad to return my call.       12/10/22 1201   Discharge Assessment   Assessment Type Discharge Planning Assessment

## 2022-12-10 NOTE — ASSESSMENT & PLAN NOTE
Chronic, uncontrolled overnight now controlled  Continue home meds amlodipine, coreg, hydralazine, clonidine patch  IV labetalol and hydralazine prn  Adjust regimen as needed

## 2022-12-11 LAB
ALBUMIN SERPL BCP-MCNC: 2.8 G/DL (ref 3.5–5.2)
ALP SERPL-CCNC: 257 U/L (ref 55–135)
ALT SERPL W/O P-5'-P-CCNC: 25 U/L (ref 10–44)
ANION GAP SERPL CALC-SCNC: 9 MMOL/L (ref 8–16)
AST SERPL-CCNC: 17 U/L (ref 10–40)
BASOPHILS # BLD AUTO: 0.01 K/UL (ref 0–0.2)
BASOPHILS NFR BLD: 0.2 % (ref 0–1.9)
BILIRUB SERPL-MCNC: 1.2 MG/DL (ref 0.1–1)
BUN SERPL-MCNC: 26 MG/DL (ref 6–20)
CALCIUM SERPL-MCNC: 8.8 MG/DL (ref 8.7–10.5)
CHLORIDE SERPL-SCNC: 109 MMOL/L (ref 95–110)
CO2 SERPL-SCNC: 23 MMOL/L (ref 23–29)
CREAT SERPL-MCNC: 3 MG/DL (ref 0.5–1.4)
DIFFERENTIAL METHOD: ABNORMAL
EOSINOPHIL # BLD AUTO: 0.1 K/UL (ref 0–0.5)
EOSINOPHIL NFR BLD: 0.8 % (ref 0–8)
ERYTHROCYTE [DISTWIDTH] IN BLOOD BY AUTOMATED COUNT: 16.7 % (ref 11.5–14.5)
EST. GFR  (NO RACE VARIABLE): 20 ML/MIN/1.73 M^2
GLUCOSE SERPL-MCNC: 101 MG/DL (ref 70–110)
HCT VFR BLD AUTO: 26.8 % (ref 37–48.5)
HGB BLD-MCNC: 9.1 G/DL (ref 12–16)
IMM GRANULOCYTES # BLD AUTO: 0.04 K/UL (ref 0–0.04)
IMM GRANULOCYTES NFR BLD AUTO: 0.6 % (ref 0–0.5)
LYMPHOCYTES # BLD AUTO: 1 K/UL (ref 1–4.8)
LYMPHOCYTES NFR BLD: 15.9 % (ref 18–48)
MAGNESIUM SERPL-MCNC: 2 MG/DL (ref 1.6–2.6)
MCH RBC QN AUTO: 27.9 PG (ref 27–31)
MCHC RBC AUTO-ENTMCNC: 34 G/DL (ref 32–36)
MCV RBC AUTO: 82 FL (ref 82–98)
MONOCYTES # BLD AUTO: 0.5 K/UL (ref 0.3–1)
MONOCYTES NFR BLD: 8.5 % (ref 4–15)
NEUTROPHILS # BLD AUTO: 4.6 K/UL (ref 1.8–7.7)
NEUTROPHILS NFR BLD: 74 % (ref 38–73)
NRBC BLD-RTO: 0 /100 WBC
PHOSPHATE SERPL-MCNC: 3.8 MG/DL (ref 2.7–4.5)
PLATELET # BLD AUTO: 75 K/UL (ref 150–450)
PMV BLD AUTO: 10.2 FL (ref 9.2–12.9)
POCT GLUCOSE: 109 MG/DL (ref 70–110)
POCT GLUCOSE: 122 MG/DL (ref 70–110)
POCT GLUCOSE: 123 MG/DL (ref 70–110)
POCT GLUCOSE: 170 MG/DL (ref 70–110)
POCT GLUCOSE: 83 MG/DL (ref 70–110)
POTASSIUM SERPL-SCNC: 4.8 MMOL/L (ref 3.5–5.1)
PROT SERPL-MCNC: 6.4 G/DL (ref 6–8.4)
RBC # BLD AUTO: 3.26 M/UL (ref 4–5.4)
SODIUM SERPL-SCNC: 141 MMOL/L (ref 136–145)
WBC # BLD AUTO: 6.23 K/UL (ref 3.9–12.7)

## 2022-12-11 PROCEDURE — 83735 ASSAY OF MAGNESIUM: CPT | Performed by: STUDENT IN AN ORGANIZED HEALTH CARE EDUCATION/TRAINING PROGRAM

## 2022-12-11 PROCEDURE — 27000221 HC OXYGEN, UP TO 24 HOURS

## 2022-12-11 PROCEDURE — 85025 COMPLETE CBC W/AUTO DIFF WBC: CPT | Performed by: STUDENT IN AN ORGANIZED HEALTH CARE EDUCATION/TRAINING PROGRAM

## 2022-12-11 PROCEDURE — 63600175 PHARM REV CODE 636 W HCPCS: Performed by: STUDENT IN AN ORGANIZED HEALTH CARE EDUCATION/TRAINING PROGRAM

## 2022-12-11 PROCEDURE — C9113 INJ PANTOPRAZOLE SODIUM, VIA: HCPCS | Performed by: NURSE PRACTITIONER

## 2022-12-11 PROCEDURE — 63600175 PHARM REV CODE 636 W HCPCS: Performed by: NURSE PRACTITIONER

## 2022-12-11 PROCEDURE — 80053 COMPREHEN METABOLIC PANEL: CPT | Performed by: STUDENT IN AN ORGANIZED HEALTH CARE EDUCATION/TRAINING PROGRAM

## 2022-12-11 PROCEDURE — C1751 CATH, INF, PER/CENT/MIDLINE: HCPCS

## 2022-12-11 PROCEDURE — 95816 EEG AWAKE AND DROWSY: CPT | Mod: 26,,, | Performed by: PSYCHIATRY & NEUROLOGY

## 2022-12-11 PROCEDURE — 36410 VNPNXR 3YR/> PHY/QHP DX/THER: CPT

## 2022-12-11 PROCEDURE — 94761 N-INVAS EAR/PLS OXIMETRY MLT: CPT

## 2022-12-11 PROCEDURE — 11000001 HC ACUTE MED/SURG PRIVATE ROOM

## 2022-12-11 PROCEDURE — 25000003 PHARM REV CODE 250: Performed by: NURSE PRACTITIONER

## 2022-12-11 PROCEDURE — 95816 PR EEG,W/AWAKE & DROWSY RECORD: ICD-10-PCS | Mod: 26,,, | Performed by: PSYCHIATRY & NEUROLOGY

## 2022-12-11 PROCEDURE — 84100 ASSAY OF PHOSPHORUS: CPT | Performed by: STUDENT IN AN ORGANIZED HEALTH CARE EDUCATION/TRAINING PROGRAM

## 2022-12-11 PROCEDURE — 25000003 PHARM REV CODE 250: Performed by: STUDENT IN AN ORGANIZED HEALTH CARE EDUCATION/TRAINING PROGRAM

## 2022-12-11 RX ORDER — GLUCAGON 1 MG
1 KIT INJECTION
Status: DISCONTINUED | OUTPATIENT
Start: 2022-12-12 | End: 2022-12-14 | Stop reason: HOSPADM

## 2022-12-11 RX ORDER — INSULIN ASPART 100 [IU]/ML
5 INJECTION, SOLUTION INTRAVENOUS; SUBCUTANEOUS
Status: DISCONTINUED | OUTPATIENT
Start: 2022-12-12 | End: 2022-12-13

## 2022-12-11 RX ORDER — IBUPROFEN 200 MG
16 TABLET ORAL
Status: DISCONTINUED | OUTPATIENT
Start: 2022-12-12 | End: 2022-12-14 | Stop reason: HOSPADM

## 2022-12-11 RX ORDER — IBUPROFEN 200 MG
24 TABLET ORAL
Status: DISCONTINUED | OUTPATIENT
Start: 2022-12-12 | End: 2022-12-14 | Stop reason: HOSPADM

## 2022-12-11 RX ADMIN — LEVETIRACETAM 500 MG: 500 TABLET, FILM COATED ORAL at 08:12

## 2022-12-11 RX ADMIN — FLUOXETINE 20 MG: 20 CAPSULE ORAL at 09:12

## 2022-12-11 RX ADMIN — LABETALOL HYDROCHLORIDE 10 MG: 5 INJECTION INTRAVENOUS at 06:12

## 2022-12-11 RX ADMIN — ISOSORBIDE MONONITRATE 120 MG: 60 TABLET, EXTENDED RELEASE ORAL at 08:12

## 2022-12-11 RX ADMIN — DICYCLOMINE HYDROCHLORIDE 10 MG: 10 CAPSULE ORAL at 08:12

## 2022-12-11 RX ADMIN — PANTOPRAZOLE SODIUM 40 MG: 40 INJECTION, POWDER, FOR SOLUTION INTRAVENOUS at 08:12

## 2022-12-11 RX ADMIN — APIXABAN 2.5 MG: 2.5 TABLET, FILM COATED ORAL at 08:12

## 2022-12-11 RX ADMIN — HYDROMORPHONE HYDROCHLORIDE 0.5 MG: 2 INJECTION INTRAMUSCULAR; INTRAVENOUS; SUBCUTANEOUS at 09:12

## 2022-12-11 RX ADMIN — DICYCLOMINE HYDROCHLORIDE 10 MG: 10 CAPSULE ORAL at 02:12

## 2022-12-11 RX ADMIN — MUPIROCIN: 20 OINTMENT TOPICAL at 08:12

## 2022-12-11 RX ADMIN — HYDRALAZINE HYDROCHLORIDE 100 MG: 25 TABLET, FILM COATED ORAL at 06:12

## 2022-12-11 RX ADMIN — HYDROMORPHONE HYDROCHLORIDE 0.5 MG: 2 INJECTION INTRAMUSCULAR; INTRAVENOUS; SUBCUTANEOUS at 03:12

## 2022-12-11 RX ADMIN — ONDANSETRON 4 MG: 2 INJECTION INTRAMUSCULAR; INTRAVENOUS at 03:12

## 2022-12-11 RX ADMIN — AMLODIPINE BESYLATE 10 MG: 5 TABLET ORAL at 08:12

## 2022-12-11 RX ADMIN — GABAPENTIN 100 MG: 100 CAPSULE ORAL at 08:12

## 2022-12-11 RX ADMIN — ACETAMINOPHEN 650 MG: 325 TABLET ORAL at 09:12

## 2022-12-11 RX ADMIN — HYDRALAZINE HYDROCHLORIDE 100 MG: 25 TABLET, FILM COATED ORAL at 09:12

## 2022-12-11 RX ADMIN — GABAPENTIN 100 MG: 100 CAPSULE ORAL at 09:12

## 2022-12-11 RX ADMIN — CARVEDILOL 25 MG: 25 TABLET, FILM COATED ORAL at 09:12

## 2022-12-11 RX ADMIN — CARVEDILOL 25 MG: 25 TABLET, FILM COATED ORAL at 08:12

## 2022-12-11 RX ADMIN — LEVETIRACETAM 500 MG: 500 TABLET, FILM COATED ORAL at 09:12

## 2022-12-11 RX ADMIN — MIRTAZAPINE 15 MG: 15 TABLET, FILM COATED ORAL at 09:12

## 2022-12-11 RX ADMIN — APIXABAN 2.5 MG: 2.5 TABLET, FILM COATED ORAL at 09:12

## 2022-12-11 RX ADMIN — HYDROMORPHONE HYDROCHLORIDE 0.5 MG: 2 INJECTION INTRAMUSCULAR; INTRAVENOUS; SUBCUTANEOUS at 04:12

## 2022-12-11 RX ADMIN — HYDRALAZINE HYDROCHLORIDE 100 MG: 25 TABLET, FILM COATED ORAL at 02:12

## 2022-12-11 RX ADMIN — DICYCLOMINE HYDROCHLORIDE 10 MG: 10 CAPSULE ORAL at 09:12

## 2022-12-11 NOTE — PLAN OF CARE
Problem: Adult Inpatient Plan of Care  Goal: Plan of Care Review  Outcome: Ongoing, Progressing  Goal: Patient-Specific Goal (Individualized)  Outcome: Ongoing, Progressing  Goal: Absence of Hospital-Acquired Illness or Injury  Outcome: Ongoing, Progressing  Goal: Optimal Comfort and Wellbeing  Outcome: Ongoing, Progressing  Goal: Readiness for Transition of Care  Outcome: Ongoing, Progressing     Problem: Diabetic Ketoacidosis  Goal: Fluid and Electrolyte Balance with Absence of Ketosis  Outcome: Ongoing, Progressing     Problem: Diabetes Comorbidity  Goal: Blood Glucose Level Within Targeted Range  Outcome: Ongoing, Progressing     Problem: Infection  Goal: Absence of Infection Signs and Symptoms  Outcome: Ongoing, Progressing     Problem: Device-Related Complication Risk (Hemodialysis)  Goal: Safe, Effective Therapy Delivery  Outcome: Ongoing, Progressing     Problem: Hemodynamic Instability (Hemodialysis)  Goal: Effective Tissue Perfusion  Outcome: Ongoing, Progressing     Problem: Infection (Hemodialysis)  Goal: Absence of Infection Signs and Symptoms  Outcome: Ongoing, Progressing     Problem: Malnutrition  Goal: Improved Nutritional Intake  Outcome: Ongoing, Progressing

## 2022-12-11 NOTE — NURSING
Pt verbalized s/s when her glucose gets too low such as sweating, confused and weak. Pt stated she knows when she gets hypoglycemia & will inform staff. Pt ate jello at bedside.

## 2022-12-11 NOTE — PROGRESS NOTES
[facility] Hospital Medicine  Progress Note    Patient Name: Tabby Howard  MRN: [id number]  Patient Class: IP- Inpatient   Admission Date: 12/9/2022  Length of Stay: 2 days  Attending Physician: Yuli Rodríguez MD  Primary Care Provider: [facility]        Subjective:     Principal Problem:Diabetic ketoacidosis without coma associated with type 2 diabetes mellitus        HPI:  32F with PMH HTN, IDDM, ESRD on HD, HFpEF, seizure disorder, adjustment disorder, GERD, DVT, and idiopathic abdominal pain presents to the ER due to severe abdominal pain. Associated symptoms include fatigue, nausea, vomiting, chest pain, and back pain. She denies SOB. She reports compliance with her scheduled dialysis with last session yesterday 12/8. She reports adherence to her insulin regimen. Of note, she had a recent hospital stay 11/30-12/7 for abdominal pain, nausea, and vomiting for which no clear etiology of her pain was identified. Found to have lab findings consistent with DKA in ER in addition to her intractable pain. Admitted to hospital medicine for further management.      Overview/Hospital Course:  ESRD diabetic patient well-known to service re-admitted with abdominal pain, nausea, and vomiting found to be in DKA. Placed on insulin gtt and DKA protocol initiated. Nephrology consulted to manage dialysis. Had uncontrolled HTN night of admission with witnessed seizures aborted spontaneously and with ativan. Already on keppra. Neurology consulted. CT head w/o without concerning findings.    Patient doesn't participate in much conversation. Tolerating clear liquids, will try to advance and transfer out of step down unit.       Interval History: No reported seizures overnight; no new complaints, tolerating liquid diet    Review of Systems   Constitutional:  Negative for chills and fever.   Respiratory:  Negative for shortness of breath.    Cardiovascular:  Negative for chest  pain.   Gastrointestinal:  Positive for abdominal pain and nausea. Negative for constipation, diarrhea and vomiting.   Neurological:  Positive for seizures. Negative for weakness and numbness.   Objective:     Vital Signs (Most Recent):  Temp: 97.1 °F (36.2 °C) (12/11/22 0800)  Pulse: 85 (12/11/22 1300)  Resp: 18 (12/11/22 1619)  BP: 105/72 (12/11/22 1300)  SpO2: 99 % (12/11/22 0705)   Vital Signs (24h Range):  Temp:  [97.1 °F (36.2 °C)-98 °F (36.7 °C)] 97.1 °F (36.2 °C)  Pulse:  [73-99] 85  Resp:  [14-20] 18  SpO2:  [93 %-100 %] 99 %  BP: (105-182)/() 105/72     Weight: 56.3 kg (124 lb 1.9 oz)  Body mass index is 22.7 kg/m².    Intake/Output Summary (Last 24 hours) at 12/11/2022 1633  Last data filed at 12/11/2022 0608  Gross per 24 hour   Intake 70 ml   Output 0 ml   Net 70 ml        Physical Exam  Vitals reviewed.   Constitutional:       General: She is not in acute distress.     Appearance: She is not ill-appearing.   HENT:      Head: Normocephalic and atraumatic.   Cardiovascular:      Rate and Rhythm: Normal rate and regular rhythm.      Pulses: Normal pulses.   Pulmonary:      Effort: Pulmonary effort is normal. No respiratory distress.      Breath sounds: Normal breath sounds. No wheezing, rhonchi or rales.   Abdominal:      General: Abdomen is flat. Bowel sounds are normal. There is no distension.      Palpations: Abdomen is soft.      Tenderness: There is generalized abdominal tenderness (pain only minimally exacerbated by palpation). There is no guarding or rebound.   Musculoskeletal:         General: No swelling, deformity or signs of injury.   Skin:     General: Skin is warm and dry.      Findings: No rash.   Neurological:      General: No focal deficit present.      Mental Status: She is alert and oriented to person, place, and time.      Cranial Nerves: No cranial nerve deficit.      Motor: No weakness.   Psychiatric:         Behavior: Behavior is cooperative.      Comments: Uncomfortable affect        Significant Labs: All pertinent labs within the past 24 hours have been reviewed.    Significant Imaging: I have reviewed all pertinent imaging results/findings within the past 24 hours.      Assessment/Plan:      * Diabetic ketoacidosis without coma associated with type 2 diabetes mellitus  Noted elevated anion gap, hyperglycemia, and serum ketones  S/p 2L IVFH in ER, held further IVFH due to tenuous volume status with hx HFpEF and ESRD on HD  DKA protocol; insulin gtt held at this time  Levemir 4u given and plan for 9u nightly as well as LDSSI  Monitor lytes and glucose close  CLD started    Adjustment disorder  Recently assessed by psychiatry MD consult last admission  Continue home fluoxetine and mirtazapine    Intractable generalized abdominal pain  Acute on chronic  Has had multiple admissions for this without clear etiology  Likely some aspect of gastritis, gastroparesis, and/or other dysmotility from chronic conditions  There has been prior concern for pain med seeking  Trop neg  Scheduled bentyl  Pain meds prn, antiemetics prn  Advance diet     Anemia of chronic kidney failure, stage 5  Patient's anemia is currently controlled. Has not received any PRBCs to date this admission. Etiology likely d/t chronic disease.  Current CBC reviewed-   Lab Results   Component Value Date    HGB 9.3 (L) 12/10/2022    HCT 27.1 (L) 12/10/2022     Monitor serial CBC and transfuse if patient becomes hemodynamically unstable, symptomatic or H/H drops below 7/21.     Thrombocytopenia  Chronic, stable  No sign of bleeding  Continue to monitor and transfuse if needed    Hypertension  Chronic, uncontrolled overnight now controlled  Continue home meds amlodipine, coreg, hydralazine, clonidine patch  IV labetalol and hydralazine prn  Adjust regimen as needed    Deep vein thrombosis (DVT) of non-extremity vein  Noted venous LUE 7/24/22: Partially occlusive thrombus within the left internal jugular and cephalic veins.  Continue  home eliquis     ESRD (end stage renal disease) on dialysis  Chronic  Nephro managing dialysis    Seizure disorder  Chronic, uncontrolled with 2 seizures night of admission  S/p ativan prn  CT head neg  Continue home keppra  Neurology consulted  Seizure precautions    Type 2 diabetes mellitus with chronic kidney disease on chronic dialysis, with long-term current use of insulin  Patient's FSGs are controlled on current medication regimen.  Last A1c reviewed-   Lab Results   Component Value Date    HGBA1C 6.2 08/24/2022     Most recent fingerstick glucose reviewed-   Recent Labs   Lab 12/10/22  2113 12/11/22  0729 12/11/22  1112 12/11/22  1622   POCTGLUCOSE 170* 83 109 122*     Current correctional scale  Low  Decrease anti-hyperglycemic dose as follows- insulin gtt on hold, starting levemir and LDSSI  Antihyperglycemics (From admission, onward)    Start     Stop Route Frequency Ordered    12/12/22 0000  insulin detemir U-100 pen 9 Units         -- SubQ Nightly 12/11/22 1629    12/12/22 0000  insulin aspart U-100 pen 5 Units         -- SubQ 3 times daily with meals 12/11/22 1629    12/10/22 1528  insulin aspart U-100 pen 0-5 Units         -- SubQ Before meals & nightly PRN 12/10/22 1429        Hold Oral hypoglycemics while patient is in the hospital.  Adjust regimen for goal glucose <180    Gastritis  Noted, chronic  Had EGD 12/5 consistent with this, no ulcers or other severe findings  Continue ppi      VTE Risk Mitigation (From admission, onward)         Ordered     apixaban tablet 2.5 mg  2 times daily         12/09/22 1417     IP VTE HIGH RISK PATIENT  Once         12/09/22 1417                Discharge Planning   TATIANA:      Code Status: Full Code   Is the patient medically ready for discharge?:     Reason for patient still in hospital (select all that apply): Patient trending condition  Discharge Plan A: Home                  Yuli Rodríguez MD  Department of Hospital Medicine   [facility]

## 2022-12-11 NOTE — ASSESSMENT & PLAN NOTE
Acute on chronic  Has had multiple admissions for this without clear etiology  Likely some aspect of gastritis, gastroparesis, and/or other dysmotility from chronic conditions  There has been prior concern for pain med seeking  Trop neg  Scheduled bentyl  Pain meds prn, antiemetics prn  Advance diet

## 2022-12-11 NOTE — EICU
Intervention Initiated From:  [facility] / EICU    Roxann intervened regarding:  Rounding (Video assessment)  Comments: Video rounds done.  Resting in bed but c/o pain & explained to pt that bedside team would be notified.  Bedside RN notified and replied she would check on pt.

## 2022-12-11 NOTE — NURSING
Given permission by pt to speak to pt's stepmother, Tanya Howard; Tanya requested an update. Updated stepmother in plan of care. [phone]   Patient's first and last name, , procedure, and correct site confirmed prior to the start of procedure.

## 2022-12-11 NOTE — PROGRESS NOTES
[facility]  Progress Note  Date: 2022 10:12 AM            Patient Name: Tabby Howard   MRN: [id number]   : 1990    AGE: 32 y.o.    LOS: 2 days Hospital Day: 3  Admit date: 2022  8:57 AM            HPI: Tabby Howard is a 32 y.o. female with a history of HTN, IDDM, ESRD on HD, HFpEF, seizure disorder, adjustment disorder, GERD, DVT, and idiopathic abdominal pain presents to the ER due to severe abdominal pain. Associated symptoms include fatigue, nausea, vomiting, chest pain, and back pain. She denies SOB. She reports compliance with her scheduled dialysis with last session yesterday . She reports adherence to her insulin regimen. Of note, she had a recent hospital stay - for abdominal pain, nausea, and vomiting for which no clear etiology of her pain was identified. Found to have lab findings consistent with DKA in ER in addition to her intractable pain.     Patient was admitted to the hospital and was started on insulin drip for diabetic ketoacidosis.  Yesterday while having IV place, patient had 2 episodes of generalized tonic-clonic seizure lasting for less than a minute.  She received a dose of Ativan followed by 1 time dose of IV Keppra 500 mg.  She was continued on her home dose of 500 mg b.i.d. Keppra.     Patient does not remember the episode of seizures yesterday.  She states that she had 1 seizure prior to this and can not tell me how long was the seizure was.  She states that she is on Keppra 500 b.i.d. at home and she thinks she takes this every other day.  Currently she complains of severe abdominal pain and denies vision trouble, nausea, vomiting, weakness or sensory changes in extremities.    2022: No acute events overnight. Patient was seen and examined by me this morning. Neuro exam is unchanged from yesterday.  No further seizure-like activity.       Vitals:  Patient Vitals for the past 24 hrs:   BP Temp Temp src Pulse Resp SpO2   22  0954 -- -- -- -- 18 --   12/11/22 0800 -- 97.1 °F (36.2 °C) -- -- -- --   12/11/22 0705 (!) 173/110 97.1 °F (36.2 °C) -- 80 20 99 %   12/11/22 0601 (!) 181/116 -- -- 88 -- 100 %   12/11/22 0500 -- -- -- 82 -- 100 %   12/11/22 0354 (!) 180/118 -- -- 85 16 100 %   12/11/22 0335 (!) 167/103 -- -- 84 16 100 %   12/11/22 0300 (!) 139/91 -- -- 73 -- 98 %   12/11/22 0200 (!) 149/98 -- -- 79 14 99 %   12/11/22 0100 (!) 152/94 -- -- 77 -- 98 %   12/11/22 0000 133/84 -- -- 79 16 98 %   12/10/22 2300 (!) 169/100 97.5 °F (36.4 °C) -- 91 16 98 %   12/10/22 2235 (!) 182/129 -- -- -- -- --   12/10/22 2124 -- -- -- -- 20 --   12/10/22 2100 (!) 170/105 -- -- 91 17 98 %   12/10/22 1934 (!) 162/100 98 °F (36.7 °C) -- 94 20 100 %   12/10/22 1900 (!) 180/121 -- -- 99 18 100 %   12/10/22 1855 (!) 177/114 -- -- 93 20 99 %   12/10/22 1802 (!) 159/94 -- -- 89 14 96 %   12/10/22 1731 133/77 -- -- 87 16 (!) 93 %   12/10/22 1602 (!) 149/86 -- -- 87 13 95 %   12/10/22 1600 (!) 141/82 98.5 °F (36.9 °C) -- 88 12 (!) 94 %   12/10/22 1531 (!) 171/118 -- -- 96 20 100 %   12/10/22 1522 (!) 167/113 -- -- -- 17 96 %   12/10/22 1514 (!) 175/131 -- -- -- -- --   12/10/22 1509 -- -- -- -- 20 --   12/10/22 1431 (!) 146/97 -- -- 86 13 99 %   12/10/22 1401 135/89 -- -- 84 13 99 %   12/10/22 1330 (!) 145/88 98.1 °F (36.7 °C) Oral 86 19 98 %   12/10/22 1314 (!) 147/92 -- -- 91 (!) 23 98 %   12/10/22 1302 113/80 -- -- 81 12 98 %   12/10/22 1245 (!) 124/92 -- -- 81 15 --   12/10/22 1217 118/87 98.2 °F (36.8 °C) -- 82 13 97 %   12/10/22 1145 127/88 -- -- 82 15 98 %   12/10/22 1131 (!) 123/90 -- -- 81 13 (!) 91 %   12/10/22 1117 (!) 131/90 -- -- 83 12 98 %   12/10/22 1101 128/88 -- -- 83 13 97 %   12/10/22 1047 (!) 140/100 -- -- 83 13 97 %   12/10/22 1031 (!) 157/99 -- -- 84 13 97 %   12/10/22 1021 (!) 172/109 -- -- 87 12 96 %   12/10/22 1017 (!) 174/113 -- -- 89 14 95 %   12/10/22 1015 (!) 172/109 -- -- 86 16 98 %     PHYSICAL EXAM:     GENERAL APPEARANCE: Alert,  well-developed, well-nourished female in moderate distress.  HEENT: Normocephalic and atraumatic. PERRL. Oropharynx unremarkable.  PULM: Normal respiratory effort. No accessory muscle use.  CV: RRR.  ABDOMEN: Soft, nontender.  EXTREMITIES: No obvious signs of vascular compromise. Pulses present. No cyanosis, clubbing or edema.  SKIN: Clear; no rashes, lesions or skin breaks in exposed areas.     NEURO:  MENTAL STATUS: Patient awake and oriented to time, place, and person, attention span/concentration normal, and speech fluent without paraphasic errors.  Affect euthymic.     CRANIAL NERVES:  CN I: Not tested.  CN II: Fundoscopic exam deferred.  CN III, IV, VI: Pupils equal, round and reactive to light.  Extraocular movements full and intact.  CN V: Facial sensation normal.  CN VII: Facial asymmetry absent.  CN VIII: Hearing grossly normal and equal bilaterally.  No skew deviation or pathologic nystagmus.  CN IX, X: Palate elevates symmetrically. Speech/articulation is clear without dysarthria.  CN XI: Shoulder shrug and chin rotation equal with good strength.  CN XII: Tongue protrusion midline.     MOTOR:  Bulk normal. Tone normal and symmetric throughout.  Abnormal movements absent.  Tremor: none present.  Strength 4/5 throughout except/unless specified below:.     REFLEXES:  DTRs 2+ throughout.  Plantar response equivocal bilaterally.  SENSATION:grossly intact throughout.  COORDINATION: normal finger-to-nose.  STATION: not tested.  GAIT: not tested.    CURRENT SCHEDULED MEDICATIONS:   amLODIPine  10 mg Oral Daily    apixaban  2.5 mg Oral BID    carvediloL  25 mg Oral BID    cloNIDine 0.2 mg/24 hr td ptwk  1 patch Transdermal Q7 Days    dicyclomine  10 mg Oral TID    epoetin teresa (PROCRIT) injection  50 Units/kg Subcutaneous Once    FLUoxetine  20 mg Oral QHS    gabapentin  100 mg Oral BID    hydrALAZINE  100 mg Oral Q8H    insulin detemir U-100  9 Units Subcutaneous QHS    isosorbide mononitrate  120 mg Oral Daily     levETIRAcetam  500 mg Oral BID    mirtazapine  15 mg Oral QHS    mupirocin   Nasal BID    pantoprazole  40 mg Intravenous Daily     CURRENT INFUSIONS:    DATA:  Recent Labs   Lab 12/05/22  0813 12/06/22  0547 12/07/22  0525 12/09/22  0955 12/09/22  1513 12/09/22  1720 12/09/22  1842 12/09/22  2254 12/10/22  0446 12/10/22  0955 12/10/22  1530 12/11/22  0417 12/11/22  0418    134* 136 128* 134*  --  136 136 139 139 140 141  --    K 4.5 5.3* 4.0 4.7 3.9  --  3.3* 4.2 3.4* 3.4* 4.0 4.8  --     101 102 90* 93*  --  96 99 101 103 106 109  --    CO2 25 22* 25 21* 22*  --  21* 22* 24 24 23 23  --    BUN 23* 32* 20 42* 49*  --  52* 53* 55* 55* 22* 26*  --    CREATININE 4.5* 5.5* 3.5* 3.4* 3.6*  --  3.8* 3.7* 3.9* 3.9* 2.3* 3.0*  --    * 423* 230* 645* 719* 639* 504* 350* 207* 114* 151* 101  --    CALCIUM 8.9 8.3* 8.9 9.7 9.4  --  9.4 9.3 9.0 8.6* 9.0 8.8  --    PHOS  --   --   --   --   --   --   --   --  4.5  --   --   --  3.8   MG  --   --   --   --   --   --   --   --  1.9  --   --   --  2.0   AST 27 27 23 33  --   --   --   --   --   --   --  17  --    ALT 47* 44 41 38  --   --   --   --   --   --   --  25  --      Recent Labs   Lab 12/05/22  0813 12/06/22  0547 12/07/22  0525 12/09/22 0955 12/10/22  0446 12/11/22  0418   WBC 4.67 4.61 6.95 7.25 10.24 6.23   HGB 7.9* 7.7* 10.7* 10.5* 9.3* 9.1*   HCT 24.4* 23.1* 31.6* 30.5* 27.1* 26.8*   PLT 70* 72* 90* 63* 75* 75*     No results found for: PROTEINCSF, GLUCCSF, WBCCSF, RBCCSF, PMNCSF  Hemoglobin A1C   Date Value Ref Range Status   08/24/2022 6.2 4.5 - 6.2 % Final     Comment:     According to ADA guidelines, hemoglobin A1C <7.0% represents  optimal control in non-pregnant diabetic patients.  Different  metrics may apply to specific populations.   Standards of Medical Care in Diabetes - 2016.    For the purpose of screening for the presence of diabetes:  <5.7%     Consistent with the absence of diabetes  5.7-6.4%  Consistent with increasing risk for  diabetes   (prediabetes)  >or=6.5%  Consistent with diabetes    Currently no consensus exists for use of hemoglobin A1C  for diagnosis of diabetes for children.     07/22/2022 6.0 (H) 4.0 - 5.6 % Final     Comment:     ADA Screening Guidelines:  5.7-6.4%  Consistent with prediabetes  >or=6.5%  Consistent with diabetes    High levels of fetal hemoglobin interfere with the HbA1C  assay. Heterozygous hemoglobin variants (HbS, HgC, etc)do  not significantly interfere with this assay.   However, presence of multiple variants may affect accuracy.     05/15/2022 5.8 (H) 4.7 - 5.6 % Final   03/06/2022 5.3 4.5 - 5.7 % Final   01/26/2022 6.8 (H) 4.7 - 5.6 % Final   04/24/2021 8.5 (H) 4.0 - 5.6 % Final     Comment:     ADA Screening Guidelines:  5.7-6.4%  Consistent with prediabetes  >or=6.5%  Consistent with diabetes    High levels of fetal hemoglobin interfere with the HbA1C  assay. Heterozygous hemoglobin variants (HbS, HgC, etc)do  not significantly interfere with this assay.   However, presence of multiple variants may affect accuracy.              I have personally reviewed and interpreted the pertinent imaging and lab results.  Imaging Results              CT Abdomen Pelvis  Without Contrast (Final result)  Result time 12/09/22 12:49:48      Final result by Gianluca Arriaga Jr., MD (12/09/22 12:49:48)                   Impression:      Continued prominent pericardial effusion.  Anasarca of the abdomen and pelvis.  Prior cholecystectomy.      Electronically signed by: Gianluca Arriaga MD  Date:    12/09/2022  Time:    12:49               Narrative:    EXAMINATION:  CT ABDOMEN PELVIS WITHOUT CONTRAST    CLINICAL HISTORY:  Abdominal pain, acute, nonlocalized;    TECHNIQUE:  Low dose axial images, sagittal and coronal reformations were obtained from the lung bases to the pubic symphysis.    COMPARISON:  CT abdomen of November 19, 2022 and October 26, 2022.    FINDINGS:  There remains a prominent pericardial effusion not  markedly changed from the prior studies.  A pleural effusion is not seen.    The liver is of normal size contour and CT density without focal defect.  The gallbladder is absent with surgical clips noted.  The pancreas appears of normal contour and CT density without edema or mass.  The spleen is of normal size and CT density.    The adrenal glands are not enlarged.  The kidneys are of normal size contour and CT density without mass stone or hydronephrosis.  The abdominal aorta and inferior vena cava are of normal caliber.    There is diffuse anasarca throughout the abdomen and pelvis.  Ascites is not identified.  The stomach is of normal configuration.  Small bowel dilatation or air-fluid levels are not seen.  No free fluid or free air is identified.  The colon appears of normal configuration without distention or mass.    The bladder is of normal contour without mass or asymmetry.  The uterus is not enlarged.                                              ASSESSMENT AND PLAN:    Breakthrough seizure  Diabetic ketoacidosis   Abdominal pain     Plan:  Etiology of breakthrough seizure is unknown however could be in the setting of metabolic derangements including diabetic ketoacidosis.    Patient is maintained on Keppra 500 mg b.i.d. at home and recommend continuing this medication as likely breakthrough seizures in the setting of metabolic derangements and hence would not change home dose of antiseizure medication.  Patient will need additional dose of 500 mg Keppra after every dialysis session.  CT head was done showed no acute intracranial abnormality  EEG ordered and pending  Seizure precautions.  Avoid medications that lower seizure threshold.  Management of diabetic ketosis and abdominal pain per primary team.  Will follow on EEG. Will see patient as needed. Please call with questions           38 minutes of care time has been spent evaluating with the patient. Time includes chart review not limited to diagnostic  imaging, labs, and vitals, patient assessment, discussion with family and nursing, current order evaluations, and new order entries.      Neptali Marley MD  Neurology/vascular Neurology  Date of Service: 12/11/2022  10:12 AM    Please note: This note was transcribed using voice recognition software. Because of this technology there are often uinintended grammatical, spelling, and other transcription errors. Please disregard these errors.

## 2022-12-11 NOTE — ASSESSMENT & PLAN NOTE
Chronic, uncontrolled with 2 seizures night of admission  S/p ativan prn  CT head neg  Continue home keppra  Neurology consulted  Seizure precautions

## 2022-12-11 NOTE — PLAN OF CARE
[facility]  Initial Discharge Assessment       Primary Care Provider: [facility]    Admission Diagnosis: Chest discomfort [R07.89]  Generalized abdominal pain [R10.84]  DKA (diabetic ketoacidosis) [E11.10]  Hyperglycemia [R73.9]    Admission Date: 12/9/2022  Expected Discharge Date:     DC assessment completed with pt at bedside. Pt confirms demographics are current. Pt lives at listed address with her dad and dependent children. PCP [facility]. and pharmacy [facility]. DME rw, glucometer. Pt takes Eliquis. Pt does HD at [facility]. Pt's dad brings her to HD. Dad, Garcia, is POA. Pt with recent admits on 11/11, 11/19, & 11/30. No new needs anticipated at DC. Pt's dad to provide ride home. CM following.      Discharge Barriers Identified: None    Payor: MEDICAID / Plan: [place] / Product Type: Managed Medicaid /     Extended Emergency Contact Information  Primary Emergency Contact: Garcia Howard  Mobile Phone: [phone]  Relation: Father  Preferred language: English   needed? No  Secondary Emergency Contact: Tanya Howard  Mobile Phone: [phone]  Relation: Step parent  Preferred language: English   needed? No    Discharge Plan A: Home  Discharge Plan B: Home with family      [facility]  [address]  [facility] [address]  Phone: [phone] Fax: [phone]    [facility] [address] [facility]  [address] [facility] [address]  Phone: [phone] Fax: [phone]    [id number] - [place], [place] - [address] AT [facility]  [address]  [place] [address]  Phone: [phone] Fax: [phone]      Initial Assessment (most recent)       Adult Discharge Assessment - 12/11/22 1406          Discharge Assessment    Assessment Type Discharge Planning Assessment     Confirmed/corrected  address, phone number and insurance Yes     Confirmed Demographics Correct on Facesheet     Source of Information patient     People in Home parent(s);child(tammy), dependent     Do you expect to return to your current living situation? Yes     Do you have help at home or someone to help you manage your care at home? Yes     Prior to hospitilization cognitive status: Alert/Oriented     Current cognitive status: Alert/Oriented     Walking or Climbing Stairs ambulation difficulty, requires equipment     Equipment Currently Used at Home walker, rolling;glucometer     Readmission within 30 days? Yes     Patient currently being followed by outpatient case management? No     Do you currently have service(s) that help you manage your care at home? No     Do you take prescription medications? Yes     Do you have prescription coverage? Yes     Do you have any problems affording any of your prescribed medications? No     Is the patient taking medications as prescribed? yes     How do you get to doctors appointments? family or friend will provide     Are you on dialysis? Yes     Do you take coumadin? No     Discharge Plan A Home     Discharge Plan B Home with family     DME Needed Upon Discharge  none     Discharge Plan discussed with: Patient     Discharge Barriers Identified None

## 2022-12-11 NOTE — ASSESSMENT & PLAN NOTE
Patient's FSGs are controlled on current medication regimen.  Last A1c reviewed-   Lab Results   Component Value Date    HGBA1C 6.2 08/24/2022     Most recent fingerstick glucose reviewed-   Recent Labs   Lab 12/10/22  2113 12/11/22  0729 12/11/22  1112 12/11/22  1622   POCTGLUCOSE 170* 83 109 122*     Current correctional scale  Low  Decrease anti-hyperglycemic dose as follows- insulin gtt on hold, starting levemir and LDSSI  Antihyperglycemics (From admission, onward)    Start     Stop Route Frequency Ordered    12/12/22 0000  insulin detemir U-100 pen 9 Units         -- SubQ Nightly 12/11/22 1629    12/12/22 0000  insulin aspart U-100 pen 5 Units         -- SubQ 3 times daily with meals 12/11/22 1629    12/10/22 1528  insulin aspart U-100 pen 0-5 Units         -- SubQ Before meals & nightly PRN 12/10/22 1429        Hold Oral hypoglycemics while patient is in the hospital.  Adjust regimen for goal glucose <180

## 2022-12-11 NOTE — SUBJECTIVE & OBJECTIVE
Interval History: No reported seizures overnight; no new complaints, tolerating liquid diet    Review of Systems   Constitutional:  Negative for chills and fever.   Respiratory:  Negative for shortness of breath.    Cardiovascular:  Negative for chest pain.   Gastrointestinal:  Positive for abdominal pain and nausea. Negative for constipation, diarrhea and vomiting.   Neurological:  Positive for seizures. Negative for weakness and numbness.   Objective:     Vital Signs (Most Recent):  Temp: 97.1 °F (36.2 °C) (12/11/22 0800)  Pulse: 85 (12/11/22 1300)  Resp: 18 (12/11/22 1619)  BP: 105/72 (12/11/22 1300)  SpO2: 99 % (12/11/22 0705)   Vital Signs (24h Range):  Temp:  [97.1 °F (36.2 °C)-98 °F (36.7 °C)] 97.1 °F (36.2 °C)  Pulse:  [73-99] 85  Resp:  [14-20] 18  SpO2:  [93 %-100 %] 99 %  BP: (105-182)/() 105/72     Weight: 56.3 kg (124 lb 1.9 oz)  Body mass index is 22.7 kg/m².    Intake/Output Summary (Last 24 hours) at 12/11/2022 1633  Last data filed at 12/11/2022 0608  Gross per 24 hour   Intake 70 ml   Output 0 ml   Net 70 ml        Physical Exam  Vitals reviewed.   Constitutional:       General: She is not in acute distress.     Appearance: She is not ill-appearing.   HENT:      Head: Normocephalic and atraumatic.   Cardiovascular:      Rate and Rhythm: Normal rate and regular rhythm.      Pulses: Normal pulses.   Pulmonary:      Effort: Pulmonary effort is normal. No respiratory distress.      Breath sounds: Normal breath sounds. No wheezing, rhonchi or rales.   Abdominal:      General: Abdomen is flat. Bowel sounds are normal. There is no distension.      Palpations: Abdomen is soft.      Tenderness: There is generalized abdominal tenderness (pain only minimally exacerbated by palpation). There is no guarding or rebound.   Musculoskeletal:         General: No swelling, deformity or signs of injury.   Skin:     General: Skin is warm and dry.      Findings: No rash.   Neurological:      General: No focal  deficit present.      Mental Status: She is alert and oriented to person, place, and time.      Cranial Nerves: No cranial nerve deficit.      Motor: No weakness.   Psychiatric:         Behavior: Behavior is cooperative.      Comments: Uncomfortable affect       Significant Labs: All pertinent labs within the past 24 hours have been reviewed.    Significant Imaging: I have reviewed all pertinent imaging results/findings within the past 24 hours.

## 2022-12-11 NOTE — EICU
Video rounding completed.  Pt resting quiet w/ eyes closed.  No distress noted.  No gtts.  B/P 173/110, HR 80, RR 20, Pox 100%.

## 2022-12-11 NOTE — PROGRESS NOTES
"  Progress Note  Nephrology    Consult Requested By: Yuli Rodríguez MD    Reason for Consult:  ESRD, DM, HTN, SHPT, anemia    SUBJECTIVE:     History of Present Illness:   31 y/o female patient known to our practice, has out patient dialysis 3x wk on TTS schedule.  Of note, she had a recent hospital stay - for abdominal pain, nausea, and vomiting for which no clear etiology of her pain was identified. Found to have lab findings consistent with DKA in ER in addition to her intractable pain. Nephrology is consulted for dialysis orders.    12/10  pressures are high, has HD orders for today, fluid removal will help.  Hypokalemic, will correct w/HD.  Pt unable to sign dialysis consent, just had dilaudid and says, "I can't really see".  I signed consent,  witnessed by bedside RN.  C/o abd pain.    last few BP readings elevated, but pt is vomiting.  Next HD Tuesday or PRN.  Labs reviewed.    Assessment/plan:    ESRD  Hypokalemia  SHPT  Anemia of chronic dz  DM    --HD per pt's schedule, TTS.  Dose all medications for dialysis  --3K bath  --PO4 at goal  --epo w/HD  --Blood glucose control per primary team.        Past Medical History:   Diagnosis Date    CKD (chronic kidney disease), stage IV 2022    Diabetes mellitus due to underlying condition with unspecified complications 2022    Gastroparesis 2022    Heart failure with preserved ejection fraction 2022    EF 55% on 3/22    History of gastroesophageal reflux (GERD)     History of supraventricular tachycardia     Hyperkalemia 2022    Hypertensive emergency 2022    Sickle cell trait 2022    Type 2 diabetes mellitus      Past Surgical History:   Procedure Laterality Date     SECTION      x 3    COLONOSCOPY      COLONOSCOPY N/A 2022    Procedure: COLONOSCOPY;  Surgeon: Jagdeep Cedeno MD;  Location: [facility];  Service: Endoscopy;  Laterality: N/A;    ESOPHAGOGASTRODUODENOSCOPY N/A 10/18/2019    Procedure: " ESOPHAGOGASTRODUODENOSCOPY (EGD);  Surgeon: Gianluca Mendez MD;  Location: [facility] ENDO;  Service: Endoscopy;  Laterality: N/A;    ESOPHAGOGASTRODUODENOSCOPY N/A 08/24/2022    Procedure: EGD (ESOPHAGOGASTRODUODENOSCOPY);  Surgeon: Micky Paredes III, MD;  Location: [facility] ENDO;  Service: Endoscopy;  Laterality: N/A;    ESOPHAGOGASTRODUODENOSCOPY N/A 12/5/2022    Procedure: EGD (ESOPHAGOGASTRODUODENOSCOPY);  Surgeon: Marcelo Zhong MD;  Location: [facility] ENDO;  Service: Endoscopy;  Laterality: N/A;    LAPAROSCOPIC CHOLECYSTECTOMY N/A 07/30/2022    Procedure: CHOLECYSTECTOMY, LAPAROSCOPIC;  Surgeon: Grey Perez MD;  Location: [facility] OR;  Service: General;  Laterality: N/A;    PLACEMENT OF DUAL-LUMEN VASCULAR CATHETER Left 07/12/2022    Procedure: INSERTION-CATHETER-JOSEPH;  Surgeon: Dionte Gan MD;  Location: [facility] OR;  Service: General;  Laterality: Left;    PLACEMENT OF DUAL-LUMEN VASCULAR CATHETER Right 07/26/2022    Procedure: INSERTION-CATHETER-Hemosplit;  Surgeon: Dionte Gan MD;  Location: [facility] OR;  Service: General;  Laterality: Right;     Family History   Problem Relation Age of Onset    Diabetes Mother     Diabetes Father      Social History     Tobacco Use    Smoking status: Never    Smokeless tobacco: Never   Substance Use Topics    Alcohol use: Not Currently    Drug use: No       Review of patient's allergies indicates:   Allergen Reactions    Penicillins Hives        Review of Systems:  Negative unless noted above.    OBJECTIVE:     Vital Signs Range (Last 24H):  Temp:  [97.1 °F (36.2 °C)-98.5 °F (36.9 °C)]   Pulse:  [73-99]   Resp:  [12-23]   BP: (113-182)/()   SpO2:  [91 %-100 %]     Physical Exam:  General- NAD noted  HEENT- WNL  Neck- supple  CV- Regular rate and rhythm  Resp- Lungs CTA Bilaterally, No increased WOB  GI- Non tender/non-distended, BS normoactive x4 quads  Extrem- No cyanosis, clubbing, edema.  Derm- skin w/d  Neuro-  No flap.     Body mass index is 22.7  kg/m².    Laboratory:  CBC:   Recent Labs   Lab 12/11/22  0418   WBC 6.23   RBC 3.26*   HGB 9.1*   HCT 26.8*   PLT 75*   MCV 82   MCH 27.9   MCHC 34.0       CMP:   Recent Labs   Lab 12/11/22  0417      CALCIUM 8.8   ALBUMIN 2.8*   PROT 6.4      K 4.8   CO2 23      BUN 26*   CREATININE 3.0*   ALKPHOS 257*   ALT 25   AST 17   BILITOT 1.2*         Diagnostic Results:  Labs: Reviewed      ASSESSMENT/PLAN:     Active Hospital Problems    Diagnosis  POA    *Diabetic ketoacidosis without coma associated with type 2 diabetes mellitus [E11.10]  Yes    Adjustment disorder [F43.20]  Yes    Intractable generalized abdominal pain [R10.84]  Yes    Anemia of chronic kidney failure, stage 5 [N18.5, D63.1]  Yes     Chronic    Thrombocytopenia [D69.6]  Yes     Chronic    Hypertension [I10]  Yes     Chronic    Deep vein thrombosis (DVT) of non-extremity vein [I82.90]  Yes     Chronic    ESRD (end stage renal disease) on dialysis [N18.6, Z99.2]  Not Applicable     Chronic    Seizure disorder [G40.909]  Yes     Chronic    Type 2 diabetes mellitus with chronic kidney disease on chronic dialysis, with long-term current use of insulin [E11.22, N18.6, Z99.2, Z79.4]  Not Applicable     Chronic    Gastritis [K29.70]  Yes      Resolved Hospital Problems   No resolved problems to display.         Thank you for allowing us to participate in the care of your patient. We will follow the patient and provide recommendations as needed.      Time spent seeing patient( greater than 1/2 spent in direct contact) :     Geeta Kaur NP

## 2022-12-12 LAB
ALBUMIN SERPL BCP-MCNC: 2.5 G/DL (ref 3.5–5.2)
ALP SERPL-CCNC: 206 U/L (ref 55–135)
ALT SERPL W/O P-5'-P-CCNC: 21 U/L (ref 10–44)
ANION GAP SERPL CALC-SCNC: 10 MMOL/L (ref 8–16)
AST SERPL-CCNC: 16 U/L (ref 10–40)
BASOPHILS # BLD AUTO: 0.02 K/UL (ref 0–0.2)
BASOPHILS NFR BLD: 0.5 % (ref 0–1.9)
BILIRUB SERPL-MCNC: 1 MG/DL (ref 0.1–1)
BUN SERPL-MCNC: 34 MG/DL (ref 6–20)
CALCIUM SERPL-MCNC: 7.9 MG/DL (ref 8.7–10.5)
CHLORIDE SERPL-SCNC: 109 MMOL/L (ref 95–110)
CO2 SERPL-SCNC: 18 MMOL/L (ref 23–29)
CREAT SERPL-MCNC: 4.6 MG/DL (ref 0.5–1.4)
DIFFERENTIAL METHOD: ABNORMAL
EOSINOPHIL # BLD AUTO: 0.1 K/UL (ref 0–0.5)
EOSINOPHIL NFR BLD: 1.9 % (ref 0–8)
ERYTHROCYTE [DISTWIDTH] IN BLOOD BY AUTOMATED COUNT: 16.6 % (ref 11.5–14.5)
EST. GFR  (NO RACE VARIABLE): 12 ML/MIN/1.73 M^2
GLUCOSE SERPL-MCNC: 326 MG/DL (ref 70–110)
HCT VFR BLD AUTO: 23.1 % (ref 37–48.5)
HGB BLD-MCNC: 7.6 G/DL (ref 12–16)
IMM GRANULOCYTES # BLD AUTO: 0.01 K/UL (ref 0–0.04)
IMM GRANULOCYTES NFR BLD AUTO: 0.2 % (ref 0–0.5)
LYMPHOCYTES # BLD AUTO: 1 K/UL (ref 1–4.8)
LYMPHOCYTES NFR BLD: 23.8 % (ref 18–48)
MAGNESIUM SERPL-MCNC: 2 MG/DL (ref 1.6–2.6)
MCH RBC QN AUTO: 27.4 PG (ref 27–31)
MCHC RBC AUTO-ENTMCNC: 32.9 G/DL (ref 32–36)
MCV RBC AUTO: 83 FL (ref 82–98)
MONOCYTES # BLD AUTO: 0.4 K/UL (ref 0.3–1)
MONOCYTES NFR BLD: 8.3 % (ref 4–15)
NEUTROPHILS # BLD AUTO: 2.8 K/UL (ref 1.8–7.7)
NEUTROPHILS NFR BLD: 65.3 % (ref 38–73)
NRBC BLD-RTO: 0 /100 WBC
PHOSPHATE SERPL-MCNC: 4.2 MG/DL (ref 2.7–4.5)
PLATELET # BLD AUTO: 78 K/UL (ref 150–450)
PMV BLD AUTO: 10.6 FL (ref 9.2–12.9)
POCT GLUCOSE: 161 MG/DL (ref 70–110)
POCT GLUCOSE: 210 MG/DL (ref 70–110)
POCT GLUCOSE: 262 MG/DL (ref 70–110)
POCT GLUCOSE: 268 MG/DL (ref 70–110)
POCT GLUCOSE: 341 MG/DL (ref 70–110)
POTASSIUM SERPL-SCNC: 5.2 MMOL/L (ref 3.5–5.1)
PROT SERPL-MCNC: 5.7 G/DL (ref 6–8.4)
RBC # BLD AUTO: 2.77 M/UL (ref 4–5.4)
SODIUM SERPL-SCNC: 137 MMOL/L (ref 136–145)
WBC # BLD AUTO: 4.32 K/UL (ref 3.9–12.7)

## 2022-12-12 PROCEDURE — 83735 ASSAY OF MAGNESIUM: CPT | Performed by: STUDENT IN AN ORGANIZED HEALTH CARE EDUCATION/TRAINING PROGRAM

## 2022-12-12 PROCEDURE — 80053 COMPREHEN METABOLIC PANEL: CPT | Performed by: STUDENT IN AN ORGANIZED HEALTH CARE EDUCATION/TRAINING PROGRAM

## 2022-12-12 PROCEDURE — 25000003 PHARM REV CODE 250: Performed by: HOSPITALIST

## 2022-12-12 PROCEDURE — 11000001 HC ACUTE MED/SURG PRIVATE ROOM

## 2022-12-12 PROCEDURE — 84100 ASSAY OF PHOSPHORUS: CPT | Performed by: STUDENT IN AN ORGANIZED HEALTH CARE EDUCATION/TRAINING PROGRAM

## 2022-12-12 PROCEDURE — 25000003 PHARM REV CODE 250: Performed by: STUDENT IN AN ORGANIZED HEALTH CARE EDUCATION/TRAINING PROGRAM

## 2022-12-12 PROCEDURE — 85025 COMPLETE CBC W/AUTO DIFF WBC: CPT | Performed by: STUDENT IN AN ORGANIZED HEALTH CARE EDUCATION/TRAINING PROGRAM

## 2022-12-12 PROCEDURE — 25000003 PHARM REV CODE 250: Performed by: INTERNAL MEDICINE

## 2022-12-12 PROCEDURE — C9113 INJ PANTOPRAZOLE SODIUM, VIA: HCPCS | Performed by: NURSE PRACTITIONER

## 2022-12-12 PROCEDURE — 63600175 PHARM REV CODE 636 W HCPCS: Performed by: STUDENT IN AN ORGANIZED HEALTH CARE EDUCATION/TRAINING PROGRAM

## 2022-12-12 PROCEDURE — 63600175 PHARM REV CODE 636 W HCPCS: Performed by: NURSE PRACTITIONER

## 2022-12-12 PROCEDURE — 94761 N-INVAS EAR/PLS OXIMETRY MLT: CPT

## 2022-12-12 PROCEDURE — 25000003 PHARM REV CODE 250: Performed by: NURSE PRACTITIONER

## 2022-12-12 PROCEDURE — 63600175 PHARM REV CODE 636 W HCPCS: Performed by: HOSPITALIST

## 2022-12-12 PROCEDURE — 36415 COLL VENOUS BLD VENIPUNCTURE: CPT | Performed by: STUDENT IN AN ORGANIZED HEALTH CARE EDUCATION/TRAINING PROGRAM

## 2022-12-12 RX ORDER — SODIUM BICARBONATE 650 MG/1
650 TABLET ORAL ONCE
Status: COMPLETED | OUTPATIENT
Start: 2022-12-12 | End: 2022-12-12

## 2022-12-12 RX ADMIN — HYDROMORPHONE HYDROCHLORIDE 0.5 MG: 2 INJECTION INTRAMUSCULAR; INTRAVENOUS; SUBCUTANEOUS at 12:12

## 2022-12-12 RX ADMIN — MUPIROCIN: 20 OINTMENT TOPICAL at 08:12

## 2022-12-12 RX ADMIN — DICYCLOMINE HYDROCHLORIDE 10 MG: 10 CAPSULE ORAL at 08:12

## 2022-12-12 RX ADMIN — MUPIROCIN: 20 OINTMENT TOPICAL at 09:12

## 2022-12-12 RX ADMIN — INSULIN ASPART 3 UNITS: 100 INJECTION, SOLUTION INTRAVENOUS; SUBCUTANEOUS at 08:12

## 2022-12-12 RX ADMIN — APIXABAN 2.5 MG: 2.5 TABLET, FILM COATED ORAL at 08:12

## 2022-12-12 RX ADMIN — GABAPENTIN 100 MG: 100 CAPSULE ORAL at 08:12

## 2022-12-12 RX ADMIN — INSULIN ASPART 5 UNITS: 100 INJECTION, SOLUTION INTRAVENOUS; SUBCUTANEOUS at 04:12

## 2022-12-12 RX ADMIN — AMLODIPINE BESYLATE 10 MG: 5 TABLET ORAL at 08:12

## 2022-12-12 RX ADMIN — INSULIN ASPART 5 UNITS: 100 INJECTION, SOLUTION INTRAVENOUS; SUBCUTANEOUS at 08:12

## 2022-12-12 RX ADMIN — DICYCLOMINE HYDROCHLORIDE 10 MG: 10 CAPSULE ORAL at 02:12

## 2022-12-12 RX ADMIN — CARVEDILOL 25 MG: 25 TABLET, FILM COATED ORAL at 08:12

## 2022-12-12 RX ADMIN — APIXABAN 2.5 MG: 2.5 TABLET, FILM COATED ORAL at 09:12

## 2022-12-12 RX ADMIN — INSULIN ASPART 1 UNITS: 100 INJECTION, SOLUTION INTRAVENOUS; SUBCUTANEOUS at 09:12

## 2022-12-12 RX ADMIN — HYDRALAZINE HYDROCHLORIDE 100 MG: 25 TABLET, FILM COATED ORAL at 10:12

## 2022-12-12 RX ADMIN — HYDRALAZINE HYDROCHLORIDE 100 MG: 25 TABLET, FILM COATED ORAL at 06:12

## 2022-12-12 RX ADMIN — CARVEDILOL 25 MG: 25 TABLET, FILM COATED ORAL at 09:12

## 2022-12-12 RX ADMIN — INSULIN DETEMIR 9 UNITS: 100 INJECTION, SOLUTION SUBCUTANEOUS at 09:12

## 2022-12-12 RX ADMIN — HYDROMORPHONE HYDROCHLORIDE 0.5 MG: 2 INJECTION INTRAMUSCULAR; INTRAVENOUS; SUBCUTANEOUS at 06:12

## 2022-12-12 RX ADMIN — INSULIN ASPART 2 UNITS: 100 INJECTION, SOLUTION INTRAVENOUS; SUBCUTANEOUS at 11:12

## 2022-12-12 RX ADMIN — GABAPENTIN 100 MG: 100 CAPSULE ORAL at 09:12

## 2022-12-12 RX ADMIN — LEVETIRACETAM 500 MG: 500 TABLET, FILM COATED ORAL at 09:12

## 2022-12-12 RX ADMIN — SODIUM BICARBONATE 650 MG TABLET 650 MG: at 01:12

## 2022-12-12 RX ADMIN — LEVETIRACETAM 500 MG: 500 TABLET, FILM COATED ORAL at 08:12

## 2022-12-12 RX ADMIN — MIRTAZAPINE 15 MG: 15 TABLET, FILM COATED ORAL at 09:12

## 2022-12-12 RX ADMIN — INSULIN ASPART 5 UNITS: 100 INJECTION, SOLUTION INTRAVENOUS; SUBCUTANEOUS at 11:12

## 2022-12-12 RX ADMIN — DICYCLOMINE HYDROCHLORIDE 10 MG: 10 CAPSULE ORAL at 09:12

## 2022-12-12 RX ADMIN — FLUOXETINE 20 MG: 20 CAPSULE ORAL at 09:12

## 2022-12-12 RX ADMIN — PANTOPRAZOLE SODIUM 40 MG: 40 INJECTION, POWDER, FOR SOLUTION INTRAVENOUS at 08:12

## 2022-12-12 RX ADMIN — ISOSORBIDE MONONITRATE 120 MG: 60 TABLET, EXTENDED RELEASE ORAL at 08:12

## 2022-12-12 RX ADMIN — SODIUM ZIRCONIUM CYCLOSILICATE 10 G: 10 POWDER, FOR SUSPENSION ORAL at 01:12

## 2022-12-12 NOTE — PROGRESS NOTES
"  Progress Note  Nephrology    Consult Requested By: Yuli Rodríguez MD    Reason for Consult:  ESRD, DM, HTN, SHPT, anemia    SUBJECTIVE:     History of Present Illness:   31 y/o female patient known to our practice, has out patient dialysis 3x wk on TTS schedule.  Of note, she had a recent hospital stay 11/30-12/7 for abdominal pain, nausea, and vomiting for which no clear etiology of her pain was identified. Found to have lab findings consistent with DKA in ER in addition to her intractable pain. Nephrology is consulted for dialysis orders.    12/10  pressures are high, has HD orders for today, fluid removal will help.  Hypokalemic, will correct w/HD.  Pt unable to sign dialysis consent, just had dilaudid and says, "I can't really see".  I signed consent,  witnessed by bedside RN.  C/o abd pain.  12/11  last few BP readings elevated, but pt is vomiting.  Next HD Tuesday or PRN.  Labs reviewed.  12/12 VSS, on 2L NC, ongoing abdominal pain    Assessment/plan:    ESRD on HD TTS  HTN  Hypokalemia/Hyperkalemia  Acidosis  SHPT  Anemia of chronic dz    - ordered HD TTS  - BP/VS stable  - ordered lokelma and bicarb supplement x 1 today  - continue a renal diet  - phos is at goal- no acute binder needs  - Hb dropped- ordered SHELLEY with HD tomorrow- will transfuse this week if Hb doesn't improve      Review of Systems:  sleeping    OBJECTIVE:     Vital Signs Range (Last 24H):  Temp:  [97.3 °F (36.3 °C)-98.6 °F (37 °C)]   Pulse:  [81-92]   Resp:  [16-20]   BP: (105-134)/(55-81)   SpO2:  [92 %-96 %]     Physical Exam:  General- NAD noted  HEENT- WNL  Neck- supple  CV- Regular rate and rhythm  Resp- Lungs CTA Bilaterally, No increased WOB  GI- Non tender/non-distended, BS normoactive x4 quads  Extrem- No cyanosis, clubbing, edema.  Derm- skin w/d  Neuro-  No flap.     Body mass index is 22.7 kg/m².    Laboratory:  CBC:   Recent Labs   Lab 12/12/22  0502   WBC 4.32   RBC 2.77*   HGB 7.6*   HCT 23.1*   PLT 78*   MCV 83   MCH " 27.4   MCHC 32.9       CMP:   Recent Labs   Lab 12/12/22  0502   *   CALCIUM 7.9*   ALBUMIN 2.5*   PROT 5.7*      K 5.2*   CO2 18*      BUN 34*   CREATININE 4.6*   ALKPHOS 206*   ALT 21   AST 16   BILITOT 1.0         Diagnostic Results:  Labs: Reviewed      ASSESSMENT/PLAN:     Active Hospital Problems    Diagnosis  POA    *Diabetic ketoacidosis without coma associated with type 2 diabetes mellitus [E11.10]  Yes    Adjustment disorder [F43.20]  Yes    Intractable generalized abdominal pain [R10.84]  Yes    Anemia of chronic kidney failure, stage 5 [N18.5, D63.1]  Yes     Chronic    Thrombocytopenia [D69.6]  Yes     Chronic    Hypertension [I10]  Yes     Chronic    Deep vein thrombosis (DVT) of non-extremity vein [I82.90]  Yes     Chronic    ESRD (end stage renal disease) on dialysis [N18.6, Z99.2]  Not Applicable     Chronic    Seizure disorder [G40.909]  Yes     Chronic    Type 2 diabetes mellitus with chronic kidney disease on chronic dialysis, with long-term current use of insulin [E11.22, N18.6, Z99.2, Z79.4]  Not Applicable     Chronic    Gastritis [K29.70]  Yes      Resolved Hospital Problems   No resolved problems to display.         Thank you for allowing us to participate in the care of your patient. We will follow the patient and provide recommendations as needed.      Time spent seeing patient( greater than 1/2 spent in direct contact) : > 35 min    Prateek Clark MD

## 2022-12-12 NOTE — SUBJECTIVE & OBJECTIVE
Interval History: patient more alert today and asking for pain meds and more food    Review of Systems   Constitutional:  Positive for activity change and appetite change.   Eyes:  Positive for visual disturbance.   Respiratory: Negative.     Cardiovascular: Negative.    Gastrointestinal:  Positive for abdominal pain.   Genitourinary:  Positive for difficulty urinating.   Musculoskeletal:  Positive for gait problem.   Neurological:  Positive for weakness.   Psychiatric/Behavioral:  Positive for decreased concentration.    Objective:     Vital Signs (Most Recent):  Temp: 97.3 °F (36.3 °C) (12/12/22 1101)  Pulse: 85 (12/12/22 1101)  Resp: 18 (12/12/22 1203)  BP: (!) 109/55 (12/12/22 1101)  SpO2: 96 % (12/12/22 1101)   Vital Signs (24h Range):  Temp:  [97.3 °F (36.3 °C)-98.6 °F (37 °C)] 97.3 °F (36.3 °C)  Pulse:  [81-92] 85  Resp:  [16-20] 18  SpO2:  [92 %-96 %] 96 %  BP: (109-134)/(55-81) 109/55     Weight: 56.3 kg (124 lb 1.9 oz)  Body mass index is 22.7 kg/m².    Intake/Output Summary (Last 24 hours) at 12/12/2022 1301  Last data filed at 12/12/2022 0605  Gross per 24 hour   Intake 450 ml   Output 0 ml   Net 450 ml      Physical Exam  Constitutional:       Appearance: She is ill-appearing.   HENT:      Mouth/Throat:      Mouth: Mucous membranes are moist.      Pharynx: Oropharynx is clear.   Cardiovascular:      Rate and Rhythm: Normal rate and regular rhythm.      Heart sounds: Murmur heard.   Pulmonary:      Effort: Respiratory distress present.      Breath sounds: No rhonchi.   Abdominal:      General: Bowel sounds are normal. There is no distension.      Palpations: Abdomen is soft.      Tenderness: There is abdominal tenderness.   Skin:     General: Skin is warm and dry.      Capillary Refill: Capillary refill takes 2 to 3 seconds.   Neurological:      Mental Status: She is alert and oriented to person, place, and time. Mental status is at baseline.       Significant Labs: All pertinent labs within the past 24  hours have been reviewed.  A1C:   Recent Labs   Lab 07/22/22  1653 08/24/22  0218   HGBA1C 6.0* 6.2     Blood Culture: No results for input(s): LABBLOO in the last 48 hours.  CBC:   Recent Labs   Lab 12/11/22  0418 12/12/22  0502   WBC 6.23 4.32   HGB 9.1* 7.6*   HCT 26.8* 23.1*   PLT 75* 78*     CMP:   Recent Labs   Lab 12/10/22  1530 12/11/22  0417 12/12/22  0502    141 137   K 4.0 4.8 5.2*    109 109   CO2 23 23 18*   * 101 326*   BUN 22* 26* 34*   CREATININE 2.3* 3.0* 4.6*   CALCIUM 9.0 8.8 7.9*   PROT  --  6.4 5.7*   ALBUMIN  --  2.8* 2.5*   BILITOT  --  1.2* 1.0   ALKPHOS  --  257* 206*   AST  --  17 16   ALT  --  25 21   ANIONGAP 11 9 10     Magnesium:   Recent Labs   Lab 12/11/22  0418 12/12/22  0502   MG 2.0 2.0     POCT Glucose:   Recent Labs   Lab 12/12/22  0253 12/12/22  0737 12/12/22  1120   POCTGLUCOSE 341* 262* 210*     TSH:   Recent Labs   Lab 07/08/22  1517   TSH 1.866       Significant Imaging: I have reviewed all pertinent imaging results/findings within the past 24 hours.

## 2022-12-12 NOTE — NURSING
Pt has eaten 3 cereals & half a sandwich throughout the night. Pt is stating she has been hungry all night & was not given food throughout the night.  @ 1833. Provided pt with sugar free pudding, but pt is still demanding a sandwich & something else.

## 2022-12-12 NOTE — PROGRESS NOTES
[facility] Hospital Medicine  Progress Note    Patient Name: Tabby Howard  MRN: [id number]  Patient Class: IP- Inpatient   Admission Date: 12/9/2022  Length of Stay: 3 days  Attending Physician: Yuli Rodríguez MD  Primary Care Provider: [facility]        Subjective:     Principal Problem:Diabetic ketoacidosis without coma associated with type 2 diabetes mellitus        HPI:  32F with PMH HTN, IDDM, ESRD on HD, HFpEF, seizure disorder, adjustment disorder, GERD, DVT, and idiopathic abdominal pain presents to the ER due to severe abdominal pain. Associated symptoms include fatigue, nausea, vomiting, chest pain, and back pain. She denies SOB. She reports compliance with her scheduled dialysis with last session yesterday 12/8. She reports adherence to her insulin regimen. Of note, she had a recent hospital stay 11/30-12/7 for abdominal pain, nausea, and vomiting for which no clear etiology of her pain was identified. Found to have lab findings consistent with DKA in ER in addition to her intractable pain. Admitted to hospital medicine for further management.      Overview/Hospital Course:  ESRD diabetic patient well-known to service re-admitted with abdominal pain, nausea, and vomiting found to be in DKA. Placed on insulin gtt and DKA protocol initiated. Nephrology consulted to manage dialysis. Had uncontrolled HTN night of admission with witnessed seizures aborted spontaneously and with ativan. Already on keppra. Neurology consulted. CT head w/o without concerning findings.    Patient doesn't participate in much conversation. Tolerating clear liquids, will try to advance and transfer out of step down unit.   12/12- overnight, patient asking for more pain meds and more to eat. Seems to be tolerating diabetic diet.  EEG pending. Drop in h/h, may need blood with HD tomorrow if continues to drop.      Interval History: patient more alert today and asking for pain meds  and more food    Review of Systems   Constitutional:  Positive for activity change and appetite change.   Eyes:  Positive for visual disturbance.   Respiratory: Negative.     Cardiovascular: Negative.    Gastrointestinal:  Positive for abdominal pain.   Genitourinary:  Positive for difficulty urinating.   Musculoskeletal:  Positive for gait problem.   Neurological:  Positive for weakness.   Psychiatric/Behavioral:  Positive for decreased concentration.    Objective:     Vital Signs (Most Recent):  Temp: 97.3 °F (36.3 °C) (12/12/22 1101)  Pulse: 85 (12/12/22 1101)  Resp: 18 (12/12/22 1203)  BP: (!) 109/55 (12/12/22 1101)  SpO2: 96 % (12/12/22 1101)   Vital Signs (24h Range):  Temp:  [97.3 °F (36.3 °C)-98.6 °F (37 °C)] 97.3 °F (36.3 °C)  Pulse:  [81-92] 85  Resp:  [16-20] 18  SpO2:  [92 %-96 %] 96 %  BP: (109-134)/(55-81) 109/55     Weight: 56.3 kg (124 lb 1.9 oz)  Body mass index is 22.7 kg/m².    Intake/Output Summary (Last 24 hours) at 12/12/2022 1301  Last data filed at 12/12/2022 0605  Gross per 24 hour   Intake 450 ml   Output 0 ml   Net 450 ml      Physical Exam  Constitutional:       Appearance: She is ill-appearing.   HENT:      Mouth/Throat:      Mouth: Mucous membranes are moist.      Pharynx: Oropharynx is clear.   Cardiovascular:      Rate and Rhythm: Normal rate and regular rhythm.      Heart sounds: Murmur heard.   Pulmonary:      Effort: Respiratory distress present.      Breath sounds: No rhonchi.   Abdominal:      General: Bowel sounds are normal. There is no distension.      Palpations: Abdomen is soft.      Tenderness: There is abdominal tenderness.   Skin:     General: Skin is warm and dry.      Capillary Refill: Capillary refill takes 2 to 3 seconds.   Neurological:      Mental Status: She is alert and oriented to person, place, and time. Mental status is at baseline.       Significant Labs: All pertinent labs within the past 24 hours have been reviewed.  A1C:   Recent Labs   Lab 07/22/22  8223  08/24/22  0218   HGBA1C 6.0* 6.2     Blood Culture: No results for input(s): LABBLOO in the last 48 hours.  CBC:   Recent Labs   Lab 12/11/22  0418 12/12/22  0502   WBC 6.23 4.32   HGB 9.1* 7.6*   HCT 26.8* 23.1*   PLT 75* 78*     CMP:   Recent Labs   Lab 12/10/22  1530 12/11/22  0417 12/12/22  0502    141 137   K 4.0 4.8 5.2*    109 109   CO2 23 23 18*   * 101 326*   BUN 22* 26* 34*   CREATININE 2.3* 3.0* 4.6*   CALCIUM 9.0 8.8 7.9*   PROT  --  6.4 5.7*   ALBUMIN  --  2.8* 2.5*   BILITOT  --  1.2* 1.0   ALKPHOS  --  257* 206*   AST  --  17 16   ALT  --  25 21   ANIONGAP 11 9 10     Magnesium:   Recent Labs   Lab 12/11/22  0418 12/12/22  0502   MG 2.0 2.0     POCT Glucose:   Recent Labs   Lab 12/12/22  0253 12/12/22  0737 12/12/22  1120   POCTGLUCOSE 341* 262* 210*     TSH:   Recent Labs   Lab 07/08/22  1517   TSH 1.866       Significant Imaging: I have reviewed all pertinent imaging results/findings within the past 24 hours.      Assessment/Plan:      * Diabetic ketoacidosis without coma associated with type 2 diabetes mellitus  Noted elevated anion gap, hyperglycemia, and serum ketones  S/p 2L IVFH in ER, held further IVFH due to tenuous volume status with hx HFpEF and ESRD on HD  DKA protocol; insulin gtt held at this time  Levemir 4u given and plan for 9u nightly as well as LDSSI  Monitor lytes and glucose close  CLD started    Adjustment disorder  Recently assessed by psychiatry MD consult last admission  Continue home fluoxetine and mirtazapine    Intractable generalized abdominal pain  Acute on chronic  Has had multiple admissions for this without clear etiology  Likely some aspect of gastritis, gastroparesis, and/or other dysmotility from chronic conditions  There has been prior concern for pain med seeking  Trop neg  Scheduled bentyl  Pain meds prn, antiemetics prn  Advance diet     Anemia of chronic kidney failure, stage 5  Patient's anemia is currently controlled. Has not received any  PRBCs to date this admission. Etiology likely d/t chronic disease.  Current CBC reviewed-   Lab Results   Component Value Date    HGB 7.6 (L) 12/12/2022    HCT 23.1 (L) 12/12/2022     Monitor serial CBC and transfuse if patient becomes hemodynamically unstable, symptomatic or H/H drops below 7/21.   Drop in h/h overnight - monitor    Thrombocytopenia  Chronic, stable  No sign of bleeding  Continue to monitor and transfuse if needed    Hypertension  Chronic, uncontrolled overnight now controlled  Continue home meds amlodipine, coreg, hydralazine, clonidine patch  IV labetalol and hydralazine prn  Adjust regimen as needed    Deep vein thrombosis (DVT) of non-extremity vein  Noted venous LUE 7/24/22: Partially occlusive thrombus within the left internal jugular and cephalic veins.  Continue home eliquis     ESRD (end stage renal disease) on dialysis  Chronic  Nephro managing dialysis- TTS HD    Seizure disorder  Chronic, uncontrolled with 2 seizures night of admission  S/p ativan prn  CT head neg  Continue home kera  Neurology consulted  Seizure precautions  EEG pending     Type 2 diabetes mellitus with chronic kidney disease on chronic dialysis, with long-term current use of insulin  Patient's FSGs are controlled on current medication regimen.  Last A1c reviewed-   Lab Results   Component Value Date    HGBA1C 6.2 08/24/2022     Most recent fingerstick glucose reviewed-   Recent Labs   Lab 12/11/22  2111 12/12/22  0253 12/12/22  0737 12/12/22  1120   POCTGLUCOSE 123* 341* 262* 210*     Current correctional scale  Low  Decrease anti-hyperglycemic dose as follows- insulin gtt on hold, starting levemir and LDSSI  Antihyperglycemics (From admission, onward)    Start     Stop Route Frequency Ordered    12/12/22 0000  insulin detemir U-100 pen 9 Units         -- SubQ Nightly 12/11/22 1629    12/12/22 0000  insulin aspart U-100 pen 5 Units         -- SubQ 3 times daily with meals 12/11/22 1629    12/10/22 1528  insulin  aspart U-100 pen 0-5 Units         -- SubQ Before meals & nightly PRN 12/10/22 1429        Hold Oral hypoglycemics while patient is in the hospital.  Adjust regimen for goal glucose <180    Continue basal+ prandial insulin dosing     Gastritis  Noted, chronic  Had EGD 12/5 consistent with this, no ulcers or other severe findings  Continue ppi      VTE Risk Mitigation (From admission, onward)         Ordered     apixaban tablet 2.5 mg  2 times daily         12/09/22 1417     IP VTE HIGH RISK PATIENT  Once         12/09/22 1417                Discharge Planning   TATIANA:      Code Status: Full Code   Is the patient medically ready for discharge?:     Reason for patient still in hospital (select all that apply): Patient trending condition  Discharge Plan A: Home                  Yuli Rodríguez MD  Department of Hospital Medicine   [facility]

## 2022-12-12 NOTE — ASSESSMENT & PLAN NOTE
Chronic, uncontrolled with 2 seizures night of admission  S/p ativan prn  CT head neg  Continue home keppra  Neurology consulted  Seizure precautions  EEG pending

## 2022-12-12 NOTE — NURSING
Pt requesting something to eat while complaining of abdominal pain & requesting pain meds for abdominal pain. Educated pt it is q6 hours, last dose was 0001 & her next dose is 0601. Educated pt she should stop eating at this moment since she is complaining of abdominal pain, pt still requesting something to eat & pain med.

## 2022-12-12 NOTE — ASSESSMENT & PLAN NOTE
Patient's FSGs are controlled on current medication regimen.  Last A1c reviewed-   Lab Results   Component Value Date    HGBA1C 6.2 08/24/2022     Most recent fingerstick glucose reviewed-   Recent Labs   Lab 12/11/22  2111 12/12/22  0253 12/12/22  0737 12/12/22  1120   POCTGLUCOSE 123* 341* 262* 210*     Current correctional scale  Low  Decrease anti-hyperglycemic dose as follows- insulin gtt on hold, starting levemir and LDSSI  Antihyperglycemics (From admission, onward)    Start     Stop Route Frequency Ordered    12/12/22 0000  insulin detemir U-100 pen 9 Units         -- SubQ Nightly 12/11/22 1629    12/12/22 0000  insulin aspart U-100 pen 5 Units         -- SubQ 3 times daily with meals 12/11/22 1629    12/10/22 1528  insulin aspart U-100 pen 0-5 Units         -- SubQ Before meals & nightly PRN 12/10/22 1429        Hold Oral hypoglycemics while patient is in the hospital.  Adjust regimen for goal glucose <180    Continue basal+ prandial insulin dosing

## 2022-12-13 LAB
ALBUMIN SERPL BCP-MCNC: 2.7 G/DL (ref 3.5–5.2)
ALP SERPL-CCNC: 194 U/L (ref 55–135)
ALT SERPL W/O P-5'-P-CCNC: 17 U/L (ref 10–44)
ANION GAP SERPL CALC-SCNC: 11 MMOL/L (ref 8–16)
AST SERPL-CCNC: 15 U/L (ref 10–40)
BASOPHILS # BLD AUTO: 0.02 K/UL (ref 0–0.2)
BASOPHILS NFR BLD: 0.4 % (ref 0–1.9)
BILIRUB SERPL-MCNC: 0.8 MG/DL (ref 0.1–1)
BUN SERPL-MCNC: 49 MG/DL (ref 6–20)
CALCIUM SERPL-MCNC: 8.2 MG/DL (ref 8.7–10.5)
CHLORIDE SERPL-SCNC: 107 MMOL/L (ref 95–110)
CO2 SERPL-SCNC: 21 MMOL/L (ref 23–29)
CREAT SERPL-MCNC: 5.6 MG/DL (ref 0.5–1.4)
DIFFERENTIAL METHOD: ABNORMAL
EOSINOPHIL # BLD AUTO: 0.1 K/UL (ref 0–0.5)
EOSINOPHIL NFR BLD: 2.8 % (ref 0–8)
ERYTHROCYTE [DISTWIDTH] IN BLOOD BY AUTOMATED COUNT: 16.1 % (ref 11.5–14.5)
EST. GFR  (NO RACE VARIABLE): 10 ML/MIN/1.73 M^2
GLUCOSE SERPL-MCNC: 244 MG/DL (ref 70–110)
HCT VFR BLD AUTO: 23.6 % (ref 37–48.5)
HGB BLD-MCNC: 7.7 G/DL (ref 12–16)
IMM GRANULOCYTES # BLD AUTO: 0.01 K/UL (ref 0–0.04)
IMM GRANULOCYTES NFR BLD AUTO: 0.2 % (ref 0–0.5)
LYMPHOCYTES # BLD AUTO: 1.1 K/UL (ref 1–4.8)
LYMPHOCYTES NFR BLD: 21.2 % (ref 18–48)
MAGNESIUM SERPL-MCNC: 2 MG/DL (ref 1.6–2.6)
MCH RBC QN AUTO: 27.7 PG (ref 27–31)
MCHC RBC AUTO-ENTMCNC: 32.6 G/DL (ref 32–36)
MCV RBC AUTO: 85 FL (ref 82–98)
MONOCYTES # BLD AUTO: 0.5 K/UL (ref 0.3–1)
MONOCYTES NFR BLD: 9.7 % (ref 4–15)
NEUTROPHILS # BLD AUTO: 3.3 K/UL (ref 1.8–7.7)
NEUTROPHILS NFR BLD: 65.7 % (ref 38–73)
NRBC BLD-RTO: 0 /100 WBC
PHOSPHATE SERPL-MCNC: 4.7 MG/DL (ref 2.7–4.5)
PLATELET # BLD AUTO: 76 K/UL (ref 150–450)
PMV BLD AUTO: 9.6 FL (ref 9.2–12.9)
POCT GLUCOSE: 125 MG/DL (ref 70–110)
POCT GLUCOSE: 173 MG/DL (ref 70–110)
POCT GLUCOSE: 182 MG/DL (ref 70–110)
POCT GLUCOSE: 261 MG/DL (ref 70–110)
POTASSIUM SERPL-SCNC: 4.9 MMOL/L (ref 3.5–5.1)
PROT SERPL-MCNC: 6.1 G/DL (ref 6–8.4)
RBC # BLD AUTO: 2.78 M/UL (ref 4–5.4)
SODIUM SERPL-SCNC: 139 MMOL/L (ref 136–145)
WBC # BLD AUTO: 5.05 K/UL (ref 3.9–12.7)

## 2022-12-13 PROCEDURE — C9113 INJ PANTOPRAZOLE SODIUM, VIA: HCPCS | Performed by: NURSE PRACTITIONER

## 2022-12-13 PROCEDURE — 80053 COMPREHEN METABOLIC PANEL: CPT | Performed by: STUDENT IN AN ORGANIZED HEALTH CARE EDUCATION/TRAINING PROGRAM

## 2022-12-13 PROCEDURE — 85025 COMPLETE CBC W/AUTO DIFF WBC: CPT | Performed by: STUDENT IN AN ORGANIZED HEALTH CARE EDUCATION/TRAINING PROGRAM

## 2022-12-13 PROCEDURE — 25000003 PHARM REV CODE 250: Performed by: STUDENT IN AN ORGANIZED HEALTH CARE EDUCATION/TRAINING PROGRAM

## 2022-12-13 PROCEDURE — 83735 ASSAY OF MAGNESIUM: CPT | Performed by: STUDENT IN AN ORGANIZED HEALTH CARE EDUCATION/TRAINING PROGRAM

## 2022-12-13 PROCEDURE — 94761 N-INVAS EAR/PLS OXIMETRY MLT: CPT

## 2022-12-13 PROCEDURE — 63600175 PHARM REV CODE 636 W HCPCS: Performed by: HOSPITALIST

## 2022-12-13 PROCEDURE — 95819 EEG AWAKE AND ASLEEP: CPT

## 2022-12-13 PROCEDURE — 11000001 HC ACUTE MED/SURG PRIVATE ROOM

## 2022-12-13 PROCEDURE — 80100014 HC HEMODIALYSIS 1:1

## 2022-12-13 PROCEDURE — 25000003 PHARM REV CODE 250: Performed by: NURSE PRACTITIONER

## 2022-12-13 PROCEDURE — 84100 ASSAY OF PHOSPHORUS: CPT | Performed by: STUDENT IN AN ORGANIZED HEALTH CARE EDUCATION/TRAINING PROGRAM

## 2022-12-13 PROCEDURE — 63600175 PHARM REV CODE 636 W HCPCS: Performed by: NURSE PRACTITIONER

## 2022-12-13 PROCEDURE — 63600175 PHARM REV CODE 636 W HCPCS: Performed by: STUDENT IN AN ORGANIZED HEALTH CARE EDUCATION/TRAINING PROGRAM

## 2022-12-13 RX ORDER — INSULIN ASPART 100 [IU]/ML
8 INJECTION, SOLUTION INTRAVENOUS; SUBCUTANEOUS
Status: DISCONTINUED | OUTPATIENT
Start: 2022-12-13 | End: 2022-12-14 | Stop reason: HOSPADM

## 2022-12-13 RX ADMIN — DICYCLOMINE HYDROCHLORIDE 10 MG: 10 CAPSULE ORAL at 08:12

## 2022-12-13 RX ADMIN — MIRTAZAPINE 15 MG: 15 TABLET, FILM COATED ORAL at 09:12

## 2022-12-13 RX ADMIN — INSULIN ASPART 8 UNITS: 100 INJECTION, SOLUTION INTRAVENOUS; SUBCUTANEOUS at 04:12

## 2022-12-13 RX ADMIN — INSULIN ASPART 5 UNITS: 100 INJECTION, SOLUTION INTRAVENOUS; SUBCUTANEOUS at 07:12

## 2022-12-13 RX ADMIN — HYDRALAZINE HYDROCHLORIDE 100 MG: 25 TABLET, FILM COATED ORAL at 09:12

## 2022-12-13 RX ADMIN — LEVETIRACETAM 500 MG: 500 TABLET, FILM COATED ORAL at 08:12

## 2022-12-13 RX ADMIN — APIXABAN 2.5 MG: 2.5 TABLET, FILM COATED ORAL at 08:12

## 2022-12-13 RX ADMIN — APIXABAN 2.5 MG: 2.5 TABLET, FILM COATED ORAL at 09:12

## 2022-12-13 RX ADMIN — EPOETIN ALFA-EPBX 5640 UNITS: 4000 INJECTION, SOLUTION INTRAVENOUS; SUBCUTANEOUS at 03:12

## 2022-12-13 RX ADMIN — HYDROMORPHONE HYDROCHLORIDE 0.5 MG: 2 INJECTION INTRAMUSCULAR; INTRAVENOUS; SUBCUTANEOUS at 06:12

## 2022-12-13 RX ADMIN — MUPIROCIN: 20 OINTMENT TOPICAL at 09:12

## 2022-12-13 RX ADMIN — FLUOXETINE 20 MG: 20 CAPSULE ORAL at 09:12

## 2022-12-13 RX ADMIN — DICYCLOMINE HYDROCHLORIDE 10 MG: 10 CAPSULE ORAL at 09:12

## 2022-12-13 RX ADMIN — CARVEDILOL 25 MG: 25 TABLET, FILM COATED ORAL at 08:12

## 2022-12-13 RX ADMIN — GABAPENTIN 100 MG: 100 CAPSULE ORAL at 08:12

## 2022-12-13 RX ADMIN — LEVETIRACETAM 500 MG: 500 TABLET, FILM COATED ORAL at 09:12

## 2022-12-13 RX ADMIN — INSULIN ASPART 8 UNITS: 100 INJECTION, SOLUTION INTRAVENOUS; SUBCUTANEOUS at 11:12

## 2022-12-13 RX ADMIN — DICYCLOMINE HYDROCHLORIDE 10 MG: 10 CAPSULE ORAL at 06:12

## 2022-12-13 RX ADMIN — HYDRALAZINE HYDROCHLORIDE 100 MG: 25 TABLET, FILM COATED ORAL at 06:12

## 2022-12-13 RX ADMIN — GABAPENTIN 100 MG: 100 CAPSULE ORAL at 09:12

## 2022-12-13 RX ADMIN — CARVEDILOL 25 MG: 25 TABLET, FILM COATED ORAL at 09:12

## 2022-12-13 RX ADMIN — MUPIROCIN: 20 OINTMENT TOPICAL at 11:12

## 2022-12-13 RX ADMIN — ISOSORBIDE MONONITRATE 120 MG: 60 TABLET, EXTENDED RELEASE ORAL at 08:12

## 2022-12-13 RX ADMIN — AMLODIPINE BESYLATE 10 MG: 5 TABLET ORAL at 08:12

## 2022-12-13 RX ADMIN — HYDROMORPHONE HYDROCHLORIDE 0.5 MG: 2 INJECTION INTRAMUSCULAR; INTRAVENOUS; SUBCUTANEOUS at 12:12

## 2022-12-13 RX ADMIN — INSULIN DETEMIR 9 UNITS: 100 INJECTION, SOLUTION SUBCUTANEOUS at 09:12

## 2022-12-13 RX ADMIN — PANTOPRAZOLE SODIUM 40 MG: 40 INJECTION, POWDER, FOR SOLUTION INTRAVENOUS at 08:12

## 2022-12-13 RX ADMIN — INSULIN ASPART 2 UNITS: 100 INJECTION, SOLUTION INTRAVENOUS; SUBCUTANEOUS at 07:12

## 2022-12-13 NOTE — PROGRESS NOTES
[facility] Hospital Medicine  Progress Note    Patient Name: Tabby Howard  MRN: [id number]  Patient Class: IP- Inpatient   Admission Date: 12/9/2022  Length of Stay: 4 days  Attending Physician: Yuli Rodríguez MD  Primary Care Provider: [facility]        Subjective:     Principal Problem:Diabetic ketoacidosis without coma associated with type 2 diabetes mellitus        HPI:  32F with PMH HTN, IDDM, ESRD on HD, HFpEF, seizure disorder, adjustment disorder, GERD, DVT, and idiopathic abdominal pain presents to the ER due to severe abdominal pain. Associated symptoms include fatigue, nausea, vomiting, chest pain, and back pain. She denies SOB. She reports compliance with her scheduled dialysis with last session yesterday 12/8. She reports adherence to her insulin regimen. Of note, she had a recent hospital stay 11/30-12/7 for abdominal pain, nausea, and vomiting for which no clear etiology of her pain was identified. Found to have lab findings consistent with DKA in ER in addition to her intractable pain. Admitted to hospital medicine for further management.      Overview/Hospital Course:  ESRD diabetic patient well-known to service re-admitted with abdominal pain, nausea, and vomiting found to be in DKA. Placed on insulin gtt and DKA protocol initiated. Nephrology consulted to manage dialysis. Had uncontrolled HTN night of admission with witnessed seizures aborted spontaneously and with ativan. Already on keppra. Neurology consulted. CT head w/o without concerning findings.    Patient doesn't participate in much conversation. Tolerating clear liquids, will try to advance and transfer out of step down unit.   12/12- overnight, patient asking for more pain meds and more to eat. Seems to be tolerating diabetic diet.  EEG pending. Drop in h/h, may need blood with HD tomorrow if continues to drop.  12/13- HD today, patient c/o abdominal pain and vomiting before HD;  will plan for dc tomorrow because pain seems to be improved       Interval History: undergoing HD, c/o abdominal pain but appears she is eating more; she reports vomiting before HD and she feels too weak to leave tonight     Review of Systems   Constitutional:  Positive for activity change and appetite change.   Eyes:  Positive for visual disturbance.   Respiratory: Negative.     Cardiovascular: Negative.    Gastrointestinal:  Positive for abdominal pain.   Genitourinary:  Positive for difficulty urinating.   Musculoskeletal:  Positive for gait problem.   Neurological:  Positive for weakness.   Psychiatric/Behavioral:  Positive for decreased concentration.    Objective:     Vital Signs (Most Recent):  Temp: 98 °F (36.7 °C) (12/13/22 1720)  Pulse: 87 (12/13/22 1720)  Resp: 18 (12/13/22 1720)  BP: (!) 167/92 (12/13/22 1720)  SpO2: 95 % (12/13/22 1534)   Vital Signs (24h Range):  Temp:  [98 °F (36.7 °C)-98.7 °F (37.1 °C)] 98 °F (36.7 °C)  Pulse:  [80-93] 87  Resp:  [16-20] 18  SpO2:  [89 %-97 %] 95 %  BP: (113-167)/(61-92) 167/92     Weight: 56.3 kg (124 lb 1.9 oz)  Body mass index is 22.7 kg/m².    Intake/Output Summary (Last 24 hours) at 12/13/2022 1733  Last data filed at 12/13/2022 1720  Gross per 24 hour   Intake 900 ml   Output 3600 ml   Net -2700 ml        Physical Exam  Constitutional:       Appearance: She is ill-appearing.   HENT:      Mouth/Throat:      Mouth: Mucous membranes are moist.      Pharynx: Oropharynx is clear.   Cardiovascular:      Rate and Rhythm: Normal rate and regular rhythm.      Heart sounds: Murmur heard.   Pulmonary:      Effort: Respiratory distress present.      Breath sounds: No rhonchi.   Abdominal:      General: Bowel sounds are normal. There is no distension.      Palpations: Abdomen is soft.      Tenderness: There is abdominal tenderness.   Skin:     General: Skin is warm and dry.      Capillary Refill: Capillary refill takes 2 to 3 seconds.   Neurological:      Mental Status: She  is alert and oriented to person, place, and time. Mental status is at baseline.       Significant Labs: All pertinent labs within the past 24 hours have been reviewed.  A1C:   Recent Labs   Lab 07/22/22  1653 08/24/22  0218   HGBA1C 6.0* 6.2       Blood Culture: No results for input(s): LABBLOO in the last 48 hours.  CBC:   Recent Labs   Lab 12/12/22  0502 12/13/22  0523   WBC 4.32 5.05   HGB 7.6* 7.7*   HCT 23.1* 23.6*   PLT 78* 76*       CMP:   Recent Labs   Lab 12/12/22  0502 12/13/22  0523    139   K 5.2* 4.9    107   CO2 18* 21*   * 244*   BUN 34* 49*   CREATININE 4.6* 5.6*   CALCIUM 7.9* 8.2*   PROT 5.7* 6.1   ALBUMIN 2.5* 2.7*   BILITOT 1.0 0.8   ALKPHOS 206* 194*   AST 16 15   ALT 21 17   ANIONGAP 10 11       Magnesium:   Recent Labs   Lab 12/12/22  0502 12/13/22  0523   MG 2.0 2.0       POCT Glucose:   Recent Labs   Lab 12/13/22  0740 12/13/22  1137 12/13/22  1651   POCTGLUCOSE 261* 182* 125*       TSH:   Recent Labs   Lab 07/08/22  1517   TSH 1.866         Significant Imaging: I have reviewed all pertinent imaging results/findings within the past 24 hours.      Assessment/Plan:      * Diabetic ketoacidosis without coma associated with type 2 diabetes mellitus  Noted elevated anion gap, hyperglycemia, and serum ketones  S/p 2L IVFH in ER, held further IVFH due to tenuous volume status with hx HFpEF and ESRD on HD  DKA protocol; insulin gtt held at this time  Levemir 4u given and plan for 9u nightly as well as LDSSI  Monitor lytes and glucose close  CLD started    Adjustment disorder  Recently assessed by psychiatry MD consult last admission  Continue home fluoxetine and mirtazapine    Intractable generalized abdominal pain  Acute on chronic  Has had multiple admissions for this without clear etiology  Likely some aspect of gastritis, gastroparesis, and/or other dysmotility from chronic conditions  There has been prior concern for pain med seeking  Trop neg  Scheduled bentyl  Pain meds  prn, antiemetics prn  Advance diet     Anemia of chronic kidney failure, stage 5  Patient's anemia is currently controlled. Has not received any PRBCs to date this admission. Etiology likely d/t chronic disease.  Current CBC reviewed-   Lab Results   Component Value Date    HGB 7.6 (L) 12/12/2022    HCT 23.1 (L) 12/12/2022     Monitor serial CBC and transfuse if patient becomes hemodynamically unstable, symptomatic or H/H drops below 7/21.   Drop in h/h overnight - monitor    Thrombocytopenia  Chronic, stable  No sign of bleeding  Continue to monitor and transfuse if needed    Hypertension  Chronic, uncontrolled overnight now controlled  Continue home meds amlodipine, coreg, hydralazine, clonidine patch  IV labetalol and hydralazine prn  Adjust regimen as needed    Deep vein thrombosis (DVT) of non-extremity vein  Noted venous LUE 7/24/22: Partially occlusive thrombus within the left internal jugular and cephalic veins.  Continue home eliquis     ESRD (end stage renal disease) on dialysis  Chronic  Nephro managing dialysis- TTS HD    Seizure disorder  Chronic, uncontrolled with 2 seizures night of admission  S/p ativan prn  CT head neg  Continue home kera  Neurology consulted  Seizure precautions  EEG pending     Type 2 diabetes mellitus with chronic kidney disease on chronic dialysis, with long-term current use of insulin  Patient's FSGs are controlled on current medication regimen.  Last A1c reviewed-   Lab Results   Component Value Date    HGBA1C 6.2 08/24/2022     Most recent fingerstick glucose reviewed-   Recent Labs   Lab 12/11/22  2111 12/12/22  0253 12/12/22  0737 12/12/22  1120   POCTGLUCOSE 123* 341* 262* 210*     Current correctional scale  Low  Decrease anti-hyperglycemic dose as follows- insulin gtt on hold, starting levemir and LDSSI  Antihyperglycemics (From admission, onward)    Start     Stop Route Frequency Ordered    12/12/22 0000  insulin detemir U-100 pen 9 Units         -- SubQ Nightly  12/11/22 1629    12/12/22 0000  insulin aspart U-100 pen 5 Units         -- SubQ 3 times daily with meals 12/11/22 1629    12/10/22 1528  insulin aspart U-100 pen 0-5 Units         -- SubQ Before meals & nightly PRN 12/10/22 1429        Hold Oral hypoglycemics while patient is in the hospital.  Adjust regimen for goal glucose <180    Continue basal+ prandial insulin dosing     Gastritis  Noted, chronic  Had EGD 12/5 consistent with this, no ulcers or other severe findings  Continue ppi      VTE Risk Mitigation (From admission, onward)         Ordered     apixaban tablet 2.5 mg  2 times daily         12/09/22 1417     IP VTE HIGH RISK PATIENT  Once         12/09/22 1417                Discharge Planning   TATIANA: 12/13/2022     Code Status: Full Code   Is the patient medically ready for discharge?:     Reason for patient still in hospital (select all that apply): Patient trending condition  Discharge Plan A: Home                  Yuli Rodríguez MD  Department of Hospital Medicine   [facility]

## 2022-12-13 NOTE — PROGRESS NOTES
"  Progress Note  Nephrology    Consult Requested By: Yuli Rodríguez MD    Reason for Consult:  ESRD, DM, HTN, SHPT, anemia    SUBJECTIVE:     History of Present Illness:   31 y/o female patient known to our practice, has out patient dialysis 3x wk on TTS schedule.  Of note, she had a recent hospital stay 11/30-12/7 for abdominal pain, nausea, and vomiting for which no clear etiology of her pain was identified. Found to have lab findings consistent with DKA in ER in addition to her intractable pain. Nephrology is consulted for dialysis orders.    12/10  pressures are high, has HD orders for today, fluid removal will help.  Hypokalemic, will correct w/HD.  Pt unable to sign dialysis consent, just had dilaudid and says, "I can't really see".  I signed consent,  witnessed by bedside RN.  C/o abd pain.  12/11  last few BP readings elevated, but pt is vomiting.  Next HD Tuesday or PRN.  Labs reviewed.  12/12 VSS, on 2L NC, ongoing abdominal pain  12/13 due for HD today- feels like she had fluid on her; c/w nausea and abd pain, not able to eat    Assessment/plan:    ESRD on HD TTS  HTN  Hypokalemia/Hyperkalemia  Acidosis  SHPT  Anemia of chronic dz    - ordered HD TTS  - BP stable- UF as able today   - ordered lokelma and bicarb supplement yest- plan for HD today with adjusted dialysate  - continue a renal diet  - phos is at goal- no acute binder needs  - Hb dropped- ordered SHELLEY with HD today- will transfuse this week if Hb doesn't improve      Review of Systems:  neg    OBJECTIVE:     Vital Signs Range (Last 24H):  Temp:  [97.3 °F (36.3 °C)-98.6 °F (37 °C)]   Pulse:  [80-86]   Resp:  [16-20]   BP: (105-139)/(24-84)   SpO2:  [89 %-100 %]     Physical Exam:  General- NAD noted  HEENT- WNL  Neck- supple  CV- Regular rate and rhythm  Resp- Lungs CTA Bilaterally, No increased WOB  GI- Non tender/non-distended, BS normoactive x4 quads  Extrem- No cyanosis, clubbing, edema.  Derm- skin w/d  Neuro-  No flap.     Body mass " index is 22.7 kg/m².    Laboratory:  CBC:   Recent Labs   Lab 12/13/22  0523   WBC 5.05   RBC 2.78*   HGB 7.7*   HCT 23.6*   PLT 76*   MCV 85   MCH 27.7   MCHC 32.6       CMP:   Recent Labs   Lab 12/13/22  0523   *   CALCIUM 8.2*   ALBUMIN 2.7*   PROT 6.1      K 4.9   CO2 21*      BUN 49*   CREATININE 5.6*   ALKPHOS 194*   ALT 17   AST 15   BILITOT 0.8         Diagnostic Results:  Labs: Reviewed      ASSESSMENT/PLAN:     Active Hospital Problems    Diagnosis  POA    *Diabetic ketoacidosis without coma associated with type 2 diabetes mellitus [E11.10]  Yes    Adjustment disorder [F43.20]  Yes    Intractable generalized abdominal pain [R10.84]  Yes    Anemia of chronic kidney failure, stage 5 [N18.5, D63.1]  Yes     Chronic    Thrombocytopenia [D69.6]  Yes     Chronic    Hypertension [I10]  Yes     Chronic    Deep vein thrombosis (DVT) of non-extremity vein [I82.90]  Yes     Chronic    ESRD (end stage renal disease) on dialysis [N18.6, Z99.2]  Not Applicable     Chronic    Seizure disorder [G40.909]  Yes     Chronic    Type 2 diabetes mellitus with chronic kidney disease on chronic dialysis, with long-term current use of insulin [E11.22, N18.6, Z99.2, Z79.4]  Not Applicable     Chronic    Gastritis [K29.70]  Yes      Resolved Hospital Problems   No resolved problems to display.         Thank you for allowing us to participate in the care of your patient. We will follow the patient and provide recommendations as needed.      Time spent seeing patient( greater than 1/2 spent in direct contact) : > 35 min    Prateek Clark MD

## 2022-12-13 NOTE — SUBJECTIVE & OBJECTIVE
Interval History: undergoing HD, c/o abdominal pain but appears she is eating more; she reports vomiting before HD and she feels too weak to leave tonight     Review of Systems   Constitutional:  Positive for activity change and appetite change.   Eyes:  Positive for visual disturbance.   Respiratory: Negative.     Cardiovascular: Negative.    Gastrointestinal:  Positive for abdominal pain.   Genitourinary:  Positive for difficulty urinating.   Musculoskeletal:  Positive for gait problem.   Neurological:  Positive for weakness.   Psychiatric/Behavioral:  Positive for decreased concentration.    Objective:     Vital Signs (Most Recent):  Temp: 98 °F (36.7 °C) (12/13/22 1720)  Pulse: 87 (12/13/22 1720)  Resp: 18 (12/13/22 1720)  BP: (!) 167/92 (12/13/22 1720)  SpO2: 95 % (12/13/22 1534)   Vital Signs (24h Range):  Temp:  [98 °F (36.7 °C)-98.7 °F (37.1 °C)] 98 °F (36.7 °C)  Pulse:  [80-93] 87  Resp:  [16-20] 18  SpO2:  [89 %-97 %] 95 %  BP: (113-167)/(61-92) 167/92     Weight: 56.3 kg (124 lb 1.9 oz)  Body mass index is 22.7 kg/m².    Intake/Output Summary (Last 24 hours) at 12/13/2022 1733  Last data filed at 12/13/2022 1720  Gross per 24 hour   Intake 900 ml   Output 3600 ml   Net -2700 ml        Physical Exam  Constitutional:       Appearance: She is ill-appearing.   HENT:      Mouth/Throat:      Mouth: Mucous membranes are moist.      Pharynx: Oropharynx is clear.   Cardiovascular:      Rate and Rhythm: Normal rate and regular rhythm.      Heart sounds: Murmur heard.   Pulmonary:      Effort: Respiratory distress present.      Breath sounds: No rhonchi.   Abdominal:      General: Bowel sounds are normal. There is no distension.      Palpations: Abdomen is soft.      Tenderness: There is abdominal tenderness.   Skin:     General: Skin is warm and dry.      Capillary Refill: Capillary refill takes 2 to 3 seconds.   Neurological:      Mental Status: She is alert and oriented to person, place, and time. Mental status  is at baseline.       Significant Labs: All pertinent labs within the past 24 hours have been reviewed.  A1C:   Recent Labs   Lab 07/22/22  1653 08/24/22  0218   HGBA1C 6.0* 6.2       Blood Culture: No results for input(s): LABBLOO in the last 48 hours.  CBC:   Recent Labs   Lab 12/12/22  0502 12/13/22  0523   WBC 4.32 5.05   HGB 7.6* 7.7*   HCT 23.1* 23.6*   PLT 78* 76*       CMP:   Recent Labs   Lab 12/12/22  0502 12/13/22  0523    139   K 5.2* 4.9    107   CO2 18* 21*   * 244*   BUN 34* 49*   CREATININE 4.6* 5.6*   CALCIUM 7.9* 8.2*   PROT 5.7* 6.1   ALBUMIN 2.5* 2.7*   BILITOT 1.0 0.8   ALKPHOS 206* 194*   AST 16 15   ALT 21 17   ANIONGAP 10 11       Magnesium:   Recent Labs   Lab 12/12/22  0502 12/13/22  0523   MG 2.0 2.0       POCT Glucose:   Recent Labs   Lab 12/13/22  0740 12/13/22  1137 12/13/22  1651   POCTGLUCOSE 261* 182* 125*       TSH:   Recent Labs   Lab 07/08/22  1517   TSH 1.866         Significant Imaging: I have reviewed all pertinent imaging results/findings within the past 24 hours.

## 2022-12-13 NOTE — PLAN OF CARE
Pt appears to be resting, eyes are closed, breaths are deep and even. VSS, NAD noted at this time. Discussed PoC with pt, stated they understood and would help as able. C/o pain handled w/PRN meds as ordered, will continue to monitor.

## 2022-12-13 NOTE — PROCEDURES
DATE: 12/11/22    EEG NUMBER: ON     REFERRING PHYSICIAN:  Dr. Rodríguez     This EEG was performed to assess for subclinical seizures      ELECTROENCEPHALOGRAM REPORT     METHODOLOGY:  Electroencephalographic (EEG) recording is with electrodes placed according to the International 10-20 placement system.  Thirty two (32) channels of digital signal are simultaneously recorded from the scalp and may include EKG, EMG, and/or eye monitors.   Recording band pass was 0.1 to 512 hz.  Digital video recording of the patient is simultaneously recorded with the EEG.  The nursing staff report clinical symptoms and may press an event button when the patient has symptoms of clinical interest to the treating physicians.  EEG and video recording is stored and archived in digital format.  The entire recording is visually reviewed, and the times identified by computer analysis as being spikes or seizures are reviewed again.  Activation procedures which include photic stimulation, hyperventilation and instructing patients to perform simple task are done in selected patients.   Compresses spectral analysis (CSA) is also performed on the activity recorded from each individual channel.  This is displayed as a power display of frequencies from 0 to 30 Hz over time.   The CSA analysis is done and displayed continuously.  This is reviewed for asymmetries in power between homologous areas of the scalp and for presence of changes in power which can be seen when seizures occur.  Sections of suspected abnormalities on the CSA is then compared with the original EEG recording.                [employer] software was also utilized in the review of this study.  This software suite analyzes the EEG recording in multiple domains.  Coherence and rhythmicity is computed to identify EEG sections which may contain organized seizures.  Each channel undergoes analysis to detect presence of spike and sharp waves which have special and morphological  characteristic of epileptic activity.  The routine EEG recording is converted from spacial into frequency domain.  This is then displayed comparing homologous areas to identify areas of significant asymmetry.  Algorithm to identify non-cortically generated artifact is used to separate eye movement, EMG and other artifact from the EEG.     EEG FINDINGS:  The recording was obtained with a number of standard bipolar and referential montages during lethargic state.  In this state, the posterior background rhythm was a symmetric, well-modulated 7-8 Hz activity, which reacted symmetrically to eye opening.  Activation procedures not performed  During drowsiness, the background rhythm waxed and waned and there were periods of slowing.  No clear sleep was recorded There were no interictal epileptiform abnormalities and no clinical or electrographic seizures were recorded.    The EKG channel revealed a sinus rhythm.     IMPRESSION:  This is an abnormal EEG during lethargic state. Diffuse slowing was noted.      CLINICAL CORRELATION:  The patient is a 32-year-old female who presented with a witnessed seizure and is currently maintained on Keppra.   This is an abnormal EEG during lethargic state.  The overall degree of disorganization and slowing for given age is suggestive of a mild encephalopathy, likely a toxic metabolic encephalopathy.  There is no evidence of an epileptic process on this recording.  No seizures were recorded during this study.

## 2022-12-13 NOTE — PROGRESS NOTES
12/13/22 1720   Handoff Report   Received From Stephanie Mena   Given To Sherri   Vital Signs   Temp 98 °F (36.7 °C)   Pulse 87   Resp 18   BP (!) 167/92   Assessments (Pre/Post)   Blood Liters Processed (BLP) 58   Transport Modality not applicable   Level of Consciousness (AVPU) alert   Dialyzer Clearance mildly streaked        Hemodialysis Catheter 12/09/22 right subclavian   Placement Date: 12/09/22   Location: right subclavian   Line Necessity Review CRRT/HD   Site Assessment No drainage;No redness;No swelling;No warmth   Line Securement Device Secured with sutures   Dressing Type Central line dressing   Dressing Status Clean;Dry;Intact   Dressing Intervention Sterile dressing change   Date on Dressing 12/13/22   Dressing Due to be Changed 12/20/22   Venous Patency/Care flushed w/o difficulty;normal saline locked   Arterial Patency/Care flushed w/o difficulty;normal saline locked   Post-Hemodialysis Assessment   Rinseback Volume (mL) 250 mL   Blood Volume Processed (Liters) 58 L   Dialyzer Clearance Lightly streaked   Duration of Treatment 180 minutes   Additional Fluid Intake (mL) 0 mL   Total UF (mL) 3500 mL   Net Fluid Removal 3000   Patient Response to Treatment tolerated well   Post-Treatment Weight 57.7 kg (127 lb 3.3 oz)   Treatment Weight Change -3   Post-Hemodialysis Comments Tx complete, no issues

## 2022-12-13 NOTE — CARE UPDATE
12/13/22 0747   Patient Assessment/Suction   Level of Consciousness (AVPU) alert   Respiratory Effort Normal;Unlabored   Expansion/Accessory Muscles/Retractions no use of accessory muscles;no retractions;expansion symmetric   All Lung Fields Breath Sounds clear;equal bilaterally   Rhythm/Pattern, Respiratory unlabored;pattern regular;depth regular   Cough Frequency no cough   PRE-TX-O2   Device (Oxygen Therapy) room air   SpO2 96 %   Pulse 86   Resp 18

## 2022-12-14 VITALS
HEIGHT: 62 IN | RESPIRATION RATE: 16 BRPM | HEART RATE: 91 BPM | BODY MASS INDEX: 22.84 KG/M2 | WEIGHT: 124.13 LBS | TEMPERATURE: 98 F | OXYGEN SATURATION: 98 % | SYSTOLIC BLOOD PRESSURE: 169 MMHG | DIASTOLIC BLOOD PRESSURE: 99 MMHG

## 2022-12-14 PROBLEM — E11.10 DIABETIC KETOACIDOSIS WITHOUT COMA ASSOCIATED WITH TYPE 2 DIABETES MELLITUS: Status: RESOLVED | Noted: 2021-04-23 | Resolved: 2022-12-14

## 2022-12-14 LAB
ANION GAP SERPL CALC-SCNC: 8 MMOL/L (ref 8–16)
BASOPHILS # BLD AUTO: 0.02 K/UL (ref 0–0.2)
BASOPHILS NFR BLD: 0.4 % (ref 0–1.9)
BUN SERPL-MCNC: 28 MG/DL (ref 6–20)
CALCIUM SERPL-MCNC: 8.7 MG/DL (ref 8.7–10.5)
CHLORIDE SERPL-SCNC: 102 MMOL/L (ref 95–110)
CO2 SERPL-SCNC: 26 MMOL/L (ref 23–29)
CREAT SERPL-MCNC: 4 MG/DL (ref 0.5–1.4)
DIFFERENTIAL METHOD: ABNORMAL
EOSINOPHIL # BLD AUTO: 0.2 K/UL (ref 0–0.5)
EOSINOPHIL NFR BLD: 4.1 % (ref 0–8)
ERYTHROCYTE [DISTWIDTH] IN BLOOD BY AUTOMATED COUNT: 15.6 % (ref 11.5–14.5)
EST. GFR  (NO RACE VARIABLE): 14 ML/MIN/1.73 M^2
GLUCOSE SERPL-MCNC: 234 MG/DL (ref 70–110)
HCT VFR BLD AUTO: 25.9 % (ref 37–48.5)
HGB BLD-MCNC: 8.6 G/DL (ref 12–16)
IMM GRANULOCYTES # BLD AUTO: 0.03 K/UL (ref 0–0.04)
IMM GRANULOCYTES NFR BLD AUTO: 0.6 % (ref 0–0.5)
LYMPHOCYTES # BLD AUTO: 1.2 K/UL (ref 1–4.8)
LYMPHOCYTES NFR BLD: 22.5 % (ref 18–48)
MAGNESIUM SERPL-MCNC: 1.8 MG/DL (ref 1.6–2.6)
MCH RBC QN AUTO: 27.6 PG (ref 27–31)
MCHC RBC AUTO-ENTMCNC: 33.2 G/DL (ref 32–36)
MCV RBC AUTO: 83 FL (ref 82–98)
MONOCYTES # BLD AUTO: 0.5 K/UL (ref 0.3–1)
MONOCYTES NFR BLD: 8.8 % (ref 4–15)
NEUTROPHILS # BLD AUTO: 3.3 K/UL (ref 1.8–7.7)
NEUTROPHILS NFR BLD: 63.6 % (ref 38–73)
NRBC BLD-RTO: 0 /100 WBC
PHOSPHATE SERPL-MCNC: 3.2 MG/DL (ref 2.7–4.5)
PLATELET # BLD AUTO: 94 K/UL (ref 150–450)
PMV BLD AUTO: 9.3 FL (ref 9.2–12.9)
POCT GLUCOSE: 297 MG/DL (ref 70–110)
POCT GLUCOSE: 298 MG/DL (ref 70–110)
POTASSIUM SERPL-SCNC: 4.5 MMOL/L (ref 3.5–5.1)
RBC # BLD AUTO: 3.12 M/UL (ref 4–5.4)
SODIUM SERPL-SCNC: 136 MMOL/L (ref 136–145)
WBC # BLD AUTO: 5.11 K/UL (ref 3.9–12.7)

## 2022-12-14 PROCEDURE — 94761 N-INVAS EAR/PLS OXIMETRY MLT: CPT

## 2022-12-14 PROCEDURE — 85025 COMPLETE CBC W/AUTO DIFF WBC: CPT | Performed by: STUDENT IN AN ORGANIZED HEALTH CARE EDUCATION/TRAINING PROGRAM

## 2022-12-14 PROCEDURE — 36415 COLL VENOUS BLD VENIPUNCTURE: CPT | Performed by: STUDENT IN AN ORGANIZED HEALTH CARE EDUCATION/TRAINING PROGRAM

## 2022-12-14 PROCEDURE — C9113 INJ PANTOPRAZOLE SODIUM, VIA: HCPCS | Performed by: NURSE PRACTITIONER

## 2022-12-14 PROCEDURE — 80048 BASIC METABOLIC PNL TOTAL CA: CPT | Performed by: HOSPITALIST

## 2022-12-14 PROCEDURE — 63600175 PHARM REV CODE 636 W HCPCS: Performed by: NURSE PRACTITIONER

## 2022-12-14 PROCEDURE — 25000003 PHARM REV CODE 250: Performed by: NURSE PRACTITIONER

## 2022-12-14 PROCEDURE — 83735 ASSAY OF MAGNESIUM: CPT | Performed by: STUDENT IN AN ORGANIZED HEALTH CARE EDUCATION/TRAINING PROGRAM

## 2022-12-14 PROCEDURE — 25000003 PHARM REV CODE 250: Performed by: STUDENT IN AN ORGANIZED HEALTH CARE EDUCATION/TRAINING PROGRAM

## 2022-12-14 PROCEDURE — 94760 N-INVAS EAR/PLS OXIMETRY 1: CPT

## 2022-12-14 PROCEDURE — 84100 ASSAY OF PHOSPHORUS: CPT | Performed by: STUDENT IN AN ORGANIZED HEALTH CARE EDUCATION/TRAINING PROGRAM

## 2022-12-14 PROCEDURE — 63600175 PHARM REV CODE 636 W HCPCS: Performed by: STUDENT IN AN ORGANIZED HEALTH CARE EDUCATION/TRAINING PROGRAM

## 2022-12-14 RX ORDER — PROMETHAZINE HYDROCHLORIDE 25 MG/1
25 TABLET ORAL EVERY 12 HOURS PRN
Qty: 60 TABLET | Refills: 0 | Status: SHIPPED | OUTPATIENT
Start: 2022-12-14 | End: 2023-01-13

## 2022-12-14 RX ORDER — DICYCLOMINE HYDROCHLORIDE 10 MG/1
10 CAPSULE ORAL 3 TIMES DAILY
Qty: 90 CAPSULE | Refills: 0 | Status: ON HOLD | OUTPATIENT
Start: 2022-12-14 | End: 2023-02-08

## 2022-12-14 RX ORDER — TRAMADOL HYDROCHLORIDE 50 MG/1
50 TABLET ORAL EVERY 8 HOURS PRN
Qty: 30 TABLET | Refills: 0 | Status: SHIPPED | OUTPATIENT
Start: 2022-12-14 | End: 2022-12-21

## 2022-12-14 RX ADMIN — LEVETIRACETAM 500 MG: 500 TABLET, FILM COATED ORAL at 10:12

## 2022-12-14 RX ADMIN — HYDROMORPHONE HYDROCHLORIDE 0.5 MG: 2 INJECTION INTRAMUSCULAR; INTRAVENOUS; SUBCUTANEOUS at 12:12

## 2022-12-14 RX ADMIN — HYDRALAZINE HYDROCHLORIDE 100 MG: 25 TABLET, FILM COATED ORAL at 06:12

## 2022-12-14 RX ADMIN — INSULIN ASPART 3 UNITS: 100 INJECTION, SOLUTION INTRAVENOUS; SUBCUTANEOUS at 08:12

## 2022-12-14 RX ADMIN — HYDROMORPHONE HYDROCHLORIDE 0.5 MG: 2 INJECTION INTRAMUSCULAR; INTRAVENOUS; SUBCUTANEOUS at 06:12

## 2022-12-14 RX ADMIN — MUPIROCIN: 20 OINTMENT TOPICAL at 10:12

## 2022-12-14 RX ADMIN — INSULIN ASPART 3 UNITS: 100 INJECTION, SOLUTION INTRAVENOUS; SUBCUTANEOUS at 12:12

## 2022-12-14 RX ADMIN — PANTOPRAZOLE SODIUM 40 MG: 40 INJECTION, POWDER, FOR SOLUTION INTRAVENOUS at 10:12

## 2022-12-14 RX ADMIN — GABAPENTIN 100 MG: 100 CAPSULE ORAL at 10:12

## 2022-12-14 RX ADMIN — DICYCLOMINE HYDROCHLORIDE 10 MG: 10 CAPSULE ORAL at 10:12

## 2022-12-14 RX ADMIN — INSULIN ASPART 8 UNITS: 100 INJECTION, SOLUTION INTRAVENOUS; SUBCUTANEOUS at 12:12

## 2022-12-14 RX ADMIN — HYDRALAZINE HYDROCHLORIDE 100 MG: 25 TABLET, FILM COATED ORAL at 02:12

## 2022-12-14 RX ADMIN — CARVEDILOL 25 MG: 25 TABLET, FILM COATED ORAL at 10:12

## 2022-12-14 RX ADMIN — INSULIN ASPART 8 UNITS: 100 INJECTION, SOLUTION INTRAVENOUS; SUBCUTANEOUS at 08:12

## 2022-12-14 RX ADMIN — DICYCLOMINE HYDROCHLORIDE 10 MG: 10 CAPSULE ORAL at 02:12

## 2022-12-14 RX ADMIN — ISOSORBIDE MONONITRATE 120 MG: 60 TABLET, EXTENDED RELEASE ORAL at 10:12

## 2022-12-14 RX ADMIN — AMLODIPINE BESYLATE 10 MG: 5 TABLET ORAL at 10:12

## 2022-12-14 RX ADMIN — APIXABAN 2.5 MG: 2.5 TABLET, FILM COATED ORAL at 10:12

## 2022-12-14 NOTE — PLAN OF CARE
Pt cleared for discharge from case management     12/14/22 4497   Final Note   Assessment Type Final Discharge Note   Anticipated Discharge Disposition Home   What phone number can be called within the next 1-3 days to see how you are doing after discharge?   ([phone])   Hospital Resources/Appts/Education Provided Appointments scheduled and added to AVS

## 2022-12-14 NOTE — PROGRESS NOTES
[facility]  Adult Nutrition  Consult Note    SUMMARY   Recommendations  1) Change diet to  DM 1500 kcal, renal diet as pt tolerates  2) Add novasource BID   3) weigh s/p HD   4) Nutrition education and handouts given again  5) Continue appetite stimulant, antiemetics as needed     Goals: 1) PO intakes > 50% of meals and supplements on solids at f/u  Nutrition Goal Status: not meeting-continues  Communication of RD Recs:  (POC, sticky note)     Assessment and Plan     Malnutrition of moderate degree  Contributing Nutrition Diagnosis  Possible Severe chronic condition related malnutrition     Related to (etiology):   Abd. Pain and decreased appetite     Signs and Symptoms (as evidenced by):   1) PO intakes < 75% of needs x > 1 month ( likely)  2) > 20% wt loss x <1 year     Interventions:  Nutrition education, clear liquid diet     Nutrition Diagnosis Status:   continues              Malnutrition Assessment  Skin (Micronutrient):  (Noam = 19)   Micronutrient Evaluation: suspected deficiency  Micronutrient Evaluation Comments: check iron   Weight Loss (Malnutrition): greater than 20% x < 1 year    likely < 75% of needs x > 1 month      Edema (Fluid Accumulation): 0-->no edema present   Subcutaneous Fat Loss (Final Summary): well nourished  Muscle Loss Evaluation (Final Summary): well nourished       Reason for Assessment     Reason For Assessment: follow up  Diagnosis:  DKA  Relevant Medical History: ESRD on HD, gastroparesis, anemia, DM2, adjustment disorder  Interdisciplinary Rounds: not attended     General Information Comments: 33 y/o female admitted with chest pain and gastroparesis. Multiple readmissions this year r/t pain, ? Source, per notes, concern for med seeking behavior. In the past pt with abd. Pain r/t gastroparesis and last admission was noted by RN making herself vomit.  Pt continues with HD inpatient. I attempted to ask pt interview questions and to give nutrition education, pt  "would not answer my questions either despite being awake, closed her eye and barely moved her head in response, unable to get clear responses. I went ahead and gave diabetic/gastroparesis education verbally and left resource on pt's tray table though pt refused to participate in the conversation. Covered with blanket, pt did not want NFPE done today. Visually appears nourished, no wasting. Significant wt loss per chart review.  22 No wasting NFPE 22. Pt nauseous today and yesterday not eating much, did drink some milk. Pt agreeable to renal/diabetic education today and asked for more handouts as she lost the others. Verbalized understanding.      Nutrition Discharge Planning: To be determined- renal, DM 1500 liliana diet + Novasource BID     Nutrition Risk Screen     Nutrition Risk Screen: none     Nutrition/Diet History     Patient Reported Diet/Restrictions/Preferences: renal  Typical Food/Fluid Intake:  22- A1C dropped from 11-5.3 in recent years, does not eat a well-balanced diet, likes hamburgers.   Spiritual, Cultural Beliefs, [employer]ism Practices, Values that Affect Care: no  Food Allergies: NKFA  Factors Affecting Nutritional Intake: pain, nausea, decreased appetite     Anthropometrics    Height Method: Stated  Height: 5' 2"  Height (inches): 62 in  Weight Method: Bed Scale  Weight: 56.3 kg 22   Weight (lb): 124 lb  Ideal Body Weight (IBW), Female: 110 lb  BMI (Calculated): 23 kg/m2  BMI Grade: WDL  Weight Loss: unintentional  Usual Body Weight (UBW), k.2 kg (22)  Some wt loss likely r/t fluid shifts as pt on HD     Lab/Procedures/Meds     Pertinent Labs Reviewed: reviewed  BMP  Lab Results   Component Value Date     2022    K 4.5 2022     2022    CO2 26 2022    BUN 28 (H) 2022    CREATININE 4.0 (H) 2022    CALCIUM 8.7 2022    ANIONGAP 8 2022    EGFRNORACEVR 14 (A) 2022     Lab Results   Component Value Date    " ALBUMIN 2.7 (L) 12/13/2022     Lab Results   Component Value Date    CALCIUM 8.7 12/14/2022    PHOS 3.2 12/14/2022     Recent Labs   Lab 12/14/22  1201   POCTGLUCOSE 298*           Pertinent Medications Reviewed: reviewed  Phenergan, epoetin, KCl, insulin, mirtazapine, zofran     Estimated/Assessed Needs     Weight Used For Calorie Calculations: 56.3 kg  Energy Calorie Requirements (kcal): 30-35 kcal/kg (HD vs. overweight)= 8565-1306 kcal  Energy Need Method: Kcal/kg  Protein Requirements: 1.2 g protein/kg ( HD) = 67 g  Weight Used For Protein Calculations: 56.3 kg  Fluid Requirements (mL): 1500 ml or per MD HD  Estimated Fluid Requirement Method: other (see comments)  CHO Requirement: 187-229 g        Nutrition Prescription Ordered     Current Diet Order: renal     Evaluation of Received Nutrient/Fluid Intake     Energy Calories Required: not meeting needs  Protein Required: not meeting needs  Fluid Required: not meeting needs  Tolerance: pain, decreased appetite    Intake/Output Summary (Last 24 hours) at 12/14/2022 1545  Last data filed at 12/14/2022 0508  Gross per 24 hour   Intake 560 ml   Output 3500 ml   Net -2940 ml         % Intake of Estimated Energy Needs: 0-25%   % Meal Intake: 0-25%      Nutrition Risk     Level of Risk/Frequency of Follow-up: 2 x weekly        Monitor and Evaluation     Food and Nutrient Intake: energy intake, food and beverage intake  Food and Nutrient Adminstration: diet order  Anthropometric Measurements: weight  Biochemical Data, Medical Tests and Procedures: electrolyte and renal panel, gastrointestinal profile, glucose/endocrine profile  Nutrition-Focused Physical Findings: overall appearance   Nutrition knowledge        Nutrition Follow-Up     RD Follow-up?: Yes

## 2022-12-14 NOTE — NURSING
Discharge instructions given to patient, verbalized understanding. Peripheral IV and midline removed. Cardiac monitor returned. Left floor via wheelchair. Transportation provided by father.

## 2022-12-14 NOTE — PROGRESS NOTES
"  Progress Note  Nephrology    Consult Requested By: Yuli Rodríguez MD    Reason for Consult:  ESRD, DM, HTN, SHPT, anemia    SUBJECTIVE:     History of Present Illness:   33 y/o female patient known to our practice, has out patient dialysis 3x wk on TTS schedule.  Of note, she had a recent hospital stay 11/30-12/7 for abdominal pain, nausea, and vomiting for which no clear etiology of her pain was identified. Found to have lab findings consistent with DKA in ER in addition to her intractable pain. Nephrology is consulted for dialysis orders.    12/10  pressures are high, has HD orders for today, fluid removal will help.  Hypokalemic, will correct w/HD.  Pt unable to sign dialysis consent, just had dilaudid and says, "I can't really see".  I signed consent,  witnessed by bedside RN.  C/o abd pain.  12/11  last few BP readings elevated, but pt is vomiting.  Next HD Tuesday or PRN.  Labs reviewed.  12/12 VSS, on 2L NC, ongoing abdominal pain  12/13 due for HD today- feels like she had fluid on her; c/w nausea and abd pain, not able to eat  12/14 no issues with HD yest- got off 3L- still with abd pain and distention    Assessment/plan:    ESRD on HD TTS  HTN  SHPT  Anemia of chronic disease    - continue HD TTS  - BP stable- UF as able    - continue a renal diet  - phos is at goal- no acute binder needs  - Hb is improved with UF- continue SHELLEY with HD- no acute transfusion needs    Review of Systems:  neg    OBJECTIVE:     Vital Signs Range (Last 24H):  Temp:  [98 °F (36.7 °C)-99.1 °F (37.3 °C)]   Pulse:  [80-93]   Resp:  [16-18]   BP: (117-172)/()   SpO2:  [93 %-98 %]     Physical Exam:  General- NAD noted  HEENT- WNL  Neck- supple  CV- Regular rate and rhythm  Resp- Lungs CTA Bilaterally, No increased WOB  GI- Tender/Distended, BS normoactive x4 quads  Extrem- No cyanosis, clubbing, edema.  Derm- skin w/d  Neuro-  No flap.     Body mass index is 22.7 kg/m².    Laboratory:  CBC:   Recent Labs   Lab " 12/14/22 0428   WBC 5.11   RBC 3.12*   HGB 8.6*   HCT 25.9*   PLT 94*   MCV 83   MCH 27.6   MCHC 33.2       CMP:   Recent Labs   Lab 12/13/22  0523 12/14/22 0428   * 234*   CALCIUM 8.2* 8.7   ALBUMIN 2.7*  --    PROT 6.1  --     136   K 4.9 4.5   CO2 21* 26    102   BUN 49* 28*   CREATININE 5.6* 4.0*   ALKPHOS 194*  --    ALT 17  --    AST 15  --    BILITOT 0.8  --          Diagnostic Results:  Labs: Reviewed      ASSESSMENT/PLAN:     Active Hospital Problems    Diagnosis  POA    Adjustment disorder [F43.20]  Yes    Intractable generalized abdominal pain [R10.84]  Yes    Anemia of chronic kidney failure, stage 5 [N18.5, D63.1]  Yes     Chronic    Thrombocytopenia [D69.6]  Yes     Chronic    Hypertension [I10]  Yes     Chronic    Deep vein thrombosis (DVT) of non-extremity vein [I82.90]  Yes     Chronic    ESRD (end stage renal disease) on dialysis [N18.6, Z99.2]  Not Applicable     Chronic    Seizure disorder [G40.909]  Yes     Chronic    Type 2 diabetes mellitus with chronic kidney disease on chronic dialysis, with long-term current use of insulin [E11.22, N18.6, Z99.2, Z79.4]  Not Applicable     Chronic    Gastritis [K29.70]  Yes      Resolved Hospital Problems    Diagnosis Date Resolved POA    *Diabetic ketoacidosis without coma associated with type 2 diabetes mellitus [E11.10] 12/14/2022 Yes         Thank you for allowing us to participate in the care of your patient. We will follow the patient and provide recommendations as needed.      Time spent seeing patient( greater than 1/2 spent in direct contact) : > 35 min    Prateek Clark MD

## 2022-12-14 NOTE — PLAN OF CARE
Recommendations  1) Change diet to  DM 1500 kcal, renal diet as pt tolerates  2) Add novasource BID   3) weigh s/p HD   4) Nutrition education and handouts given again  5) Continue appetite stimulant, antiemetics as needed     Goals: 1) PO intakes > 50% of meals and supplements on solids at f/u  Nutrition Goal Status: not meeting-continues  Communication of RD Recs:  (POC, sticky note)

## 2022-12-14 NOTE — PLAN OF CARE
POC discussed with patient, verbalized understanding. Patient with uneventful night, slept off and on between care. VS stable, slightly hypertensive. Medicated every 6 hours with IV pain medication, though called very frequently between shots for pain medication. Ate cereal twice during the night. Monitoring accuchecks. L upper arm PICC noted. R upper chest wall Apolinar, dressing CDI. Call light at bedside. Bed alarm in use.

## 2022-12-15 ENCOUNTER — TELEPHONE (OUTPATIENT)
Dept: MEDSURG UNIT | Facility: HOSPITAL | Age: 32
End: 2022-12-15
Payer: MEDICAID

## 2022-12-15 NOTE — DISCHARGE SUMMARY
[facility] Medicine  Discharge Summary      Patient Name: Tabby Howard  MRN: [id number]  UNIQUE: [id number]  Patient Class: IP- Inpatient  Admission Date: 12/9/2022  Hospital Length of Stay: 5 days  Discharge Date and Time: 12/14/2022   Attending Physician: No att. providers found   Discharging Provider: Yuli Rodríguez MD  Primary Care Provider: [facility]    Primary Care Team: Networked reference to record PCT     HPI:   32F with PMH HTN, IDDM, ESRD on HD, HFpEF, seizure disorder, adjustment disorder, GERD, DVT, and idiopathic abdominal pain presents to the ER due to severe abdominal pain. Associated symptoms include fatigue, nausea, vomiting, chest pain, and back pain. She denies SOB. She reports compliance with her scheduled dialysis with last session yesterday 12/8. She reports adherence to her insulin regimen. Of note, she had a recent hospital stay 11/30-12/7 for abdominal pain, nausea, and vomiting for which no clear etiology of her pain was identified. Found to have lab findings consistent with DKA in ER in addition to her intractable pain. Admitted to hospital medicine for further management.      * No surgery found *      Hospital Course:   ESRD diabetic patient well-known to service re-admitted with abdominal pain, nausea, and vomiting found to be in DKA. Placed on insulin gtt and DKA protocol initiated. Nephrology consulted to manage dialysis. Had uncontrolled HTN night of admission with witnessed seizures aborted spontaneously and with ativan. Already on keppra. Neurology consulted. CT head w/o without concerning findings.    Patient doesn't participate in much conversation. Tolerating clear liquids, will try to advance and transfer out of step down unit.   12/12- overnight, patient asking for more pain meds and more to eat. Seems to be tolerating diabetic diet.  EEG pending. Drop in h/h, may need blood with HD tomorrow if continues to  drop.  12/13- HD today, patient c/o abdominal pain and vomiting before HD; will plan for dc tomorrow because pain seems to be improved   12/14- patient will dc home today; prescription for tramadol and phenergan sent to [facility] pharmacy; counseled on compliance with insulin as to not put herself into DKA; SW discussed her mental health state and needing outpatient resources to help manage her depression and try to improve her state of health        Goals of Care Treatment Preferences:  Code Status: Full Code    Health care agent: Step-Mother Tanya Howard / Father Garcia Howard  Health care agent number: [phone] / [phone]          What is most important right now is to focus on remaining as independent as possible.  Accordingly, we have decided that the best plan to meet the patient's goals includes continuing with treatment.      Consults:   Consults (From admission, onward)        Status Ordering Provider     Inpatient consult to Neurology  Once        Provider:  Neptali Marley MD    Completed KOKO YUSUF     Inpatient consult to Registered Dietitian/Nutritionist  Once        Provider:  (Not yet assigned)    Completed KOKO YUSUF     Inpatient consult to Nephrology  Once        Provider:  Mando Benitez MD    Completed KOKO YUSUF          No new Assessment & Plan notes have been filed under this hospital service since the last note was generated.  Service: Hospital Medicine    Final Active Diagnoses:    Diagnosis Date Noted POA    Adjustment disorder [F43.20] 12/07/2022 Yes    Intractable generalized abdominal pain [R10.84] 12/02/2022 Yes    Anemia of chronic kidney failure, stage 5 [N18.5, D63.1] 10/31/2022 Yes     Chronic    Thrombocytopenia [D69.6] 10/26/2022 Yes     Chronic    Hypertension [I10] 07/30/2022 Yes     Chronic    Deep vein thrombosis (DVT) of non-extremity vein [I82.90] 07/26/2022 Yes     Chronic    ESRD (end stage renal disease) on dialysis [N18.6, Z99.2] 07/22/2022  Not Applicable     Chronic    Seizure disorder [G40.909] 05/29/2022 Yes     Chronic    Type 2 diabetes mellitus with chronic kidney disease on chronic dialysis, with long-term current use of insulin [E11.22, N18.6, Z99.2, Z79.4] 10/16/2019 Not Applicable     Chronic    Gastritis [K29.70] 10/15/2019 Yes      Problems Resolved During this Admission:    Diagnosis Date Noted Date Resolved POA    PRINCIPAL PROBLEM:  Diabetic ketoacidosis without coma associated with type 2 diabetes mellitus [E11.10] 04/23/2021 12/14/2022 Yes       Discharged Condition: good    Disposition: Home or Self Care    Follow Up:   Follow-up Information     Access [facility]. Go to.    Why: APPOINTMENT:  December 19, 2022 at 1:30pm  Melony Kim NP  Contact information:  [place] [id number]  [phone]                       Patient Instructions:      Diet diabetic     Diet renal     Notify your health care provider if you experience any of the following:  persistent nausea and vomiting or diarrhea     Notify your health care provider if you experience any of the following:  severe uncontrolled pain     Activity as tolerated       Significant Diagnostic Studies: Labs:   BMP:   Recent Labs   Lab 12/14/22 0428   *      K 4.5      CO2 26   BUN 28*   CREATININE 4.0*   CALCIUM 8.7   MG 1.8    and CBC   Recent Labs   Lab 12/14/22 0428   WBC 5.11   HGB 8.6*   HCT 25.9*   PLT 94*       Pending Diagnostic Studies:     None         Medications:  Reconciled Home Medications:      Medication List      CHANGE how you take these medications    dicyclomine 10 MG capsule  Commonly known as: BENTYL  Take 1 capsule (10 mg total) by mouth 3 (three) times daily.  What changed:   · when to take this  · reasons to take this     promethazine 25 MG tablet  Commonly known as: PHENERGAN  Take 1 tablet (25 mg total) by mouth every 12 (twelve) hours as needed (nausea/vomiting not relieved with  "ondansetron (zofran)).  What changed:   · medication strength  · how much to take        CONTINUE taking these medications    albuterol 90 mcg/actuation inhaler  Commonly known as: PROVENTIL/VENTOLIN HFA  Inhale 2 puffs into the lungs every 6 (six) hours as needed for Wheezing. Rescue     amLODIPine 10 MG tablet  Commonly known as: NORVASC  Take 1 tablet (10 mg total) by mouth once daily.     BD ULTRA-FINE MAGALIE PEN NEEDLE 32 gauge x 5/32" Ndle  Generic drug: pen needle, diabetic  Inject into the skin 5 times per day with insulin     carvediloL 25 MG tablet  Commonly known as: COREG  Take 1 tablet (25 mg total) by mouth 2 (two) times daily.     cloNIDine 0.2 mg/24 hr td ptwk 0.2 mg/24 hr  Commonly known as: CATAPRES  Place 1 patch onto the skin every 7 days.     ELIQUIS 5 mg Tab  Generic drug: apixaban  Take 1 tablet (5 mg total) by mouth 2 (two) times daily.     FLUoxetine 20 MG capsule  Take 1 capsule (20 mg total) by mouth once daily.     gabapentin 100 MG capsule  Commonly known as: NEURONTIN  Take 1 capsule (100 mg total) by mouth 2 (two) times daily.     hydrALAZINE 100 MG tablet  Commonly known as: APRESOLINE  Take 1 tablet (100 mg total) by mouth every 8 (eight) hours.     insulin detemir U-100 100 unit/mL (3 mL) Inpn pen  Commonly known as: Levemir FLEXTOUCH  Inject 9 Units into the skin once daily.     isosorbide mononitrate 60 MG 24 hr tablet  Commonly known as: IMDUR  Take 2 tablets (120 mg total) by mouth once daily.     levETIRAcetam 500 MG Tab  Commonly known as: KEPPRA  Take 1 tablet (500 mg total) by mouth 2 (two) times daily.     mirtazapine 15 MG disintegrating tablet  Commonly known as: REMERON SOL-TAB  Take 1 tablet (15 mg total) by mouth nightly.     NovoLOG Flexpen U-100 Insulin 100 unit/mL (3 mL) Inpn pen  Generic drug: insulin aspart U-100  Inject 1-10 Units into the skin before meals and at bedtime as needed (Hyperglycemia). Max daily dose 40 units.     ondansetron 4 MG tablet  Commonly " known as: ZOFRAN  Take 1 tablet (4 mg total) by mouth every 8 (eight) hours as needed for Nausea.     pantoprazole 40 MG tablet  Commonly known as: PROTONIX  Take 40 mg by mouth once daily.     traMADoL 50 mg tablet  Commonly known as: ULTRAM  Take 1 tablet (50 mg total) by mouth every 8 (eight) hours as needed for Pain.        STOP taking these medications    methocarbamoL 500 MG Tab  Commonly known as: ROBAXIN            Indwelling Lines/Drains at time of discharge:   Lines/Drains/Airways     Central Venous Catheter Line  Duration                Hemodialysis Catheter 12/09/22 right subclavian 6 days                Time spent on the discharge of patient: 30 minutes         Yuli Rodríguez MD  Department of Hospital Medicine  [facility]

## 2022-12-17 LAB
FINAL PATHOLOGIC DIAGNOSIS: NORMAL
GROSS: NORMAL
Lab: NORMAL
MICROSCOPIC EXAM: NORMAL

## 2022-12-23 ENCOUNTER — HOSPITAL ENCOUNTER (EMERGENCY)
Facility: HOSPITAL | Age: 32
Discharge: ANOTHER HOSPITAL | End: 2022-12-23
Attending: EMERGENCY MEDICINE
Payer: MEDICAID

## 2022-12-23 ENCOUNTER — HOSPITAL ENCOUNTER (OUTPATIENT)
Facility: HOSPITAL | Age: 32
Discharge: HOME | DRG: 304 | End: 2022-12-24
Attending: INTERNAL MEDICINE | Admitting: INTERNAL MEDICINE
Payer: MEDICAID

## 2022-12-23 VITALS
HEIGHT: 62 IN | BODY MASS INDEX: 24.29 KG/M2 | WEIGHT: 132 LBS | TEMPERATURE: 98 F | OXYGEN SATURATION: 97 % | SYSTOLIC BLOOD PRESSURE: 162 MMHG | RESPIRATION RATE: 18 BRPM | DIASTOLIC BLOOD PRESSURE: 84 MMHG | HEART RATE: 89 BPM

## 2022-12-23 DIAGNOSIS — R73.9 HYPERGLYCEMIA: Primary | ICD-10-CM

## 2022-12-23 DIAGNOSIS — D64.9 SYMPTOMATIC ANEMIA: ICD-10-CM

## 2022-12-23 DIAGNOSIS — R11.2 NAUSEA AND VOMITING, UNSPECIFIED VOMITING TYPE: ICD-10-CM

## 2022-12-23 DIAGNOSIS — I16.0 HYPERTENSIVE URGENCY: ICD-10-CM

## 2022-12-23 DIAGNOSIS — R74.01 TRANSAMINITIS: ICD-10-CM

## 2022-12-23 DIAGNOSIS — R10.84 GENERALIZED ABDOMINAL PAIN: ICD-10-CM

## 2022-12-23 LAB
ABO + RH BLD: NORMAL
ABO + RH BLD: NORMAL
ALBUMIN SERPL BCP-MCNC: 3.4 G/DL (ref 3.5–5.2)
ALP SERPL-CCNC: 337 U/L (ref 55–135)
ALT SERPL W/O P-5'-P-CCNC: 97 U/L (ref 10–44)
ANION GAP SERPL CALC-SCNC: 15 MMOL/L (ref 8–16)
ANION GAP SERPL CALC-SCNC: 7 MMOL/L (ref 8–16)
ANION GAP SERPL CALC-SCNC: 8 MMOL/L (ref 8–16)
APAP SERPL-MCNC: <3 UG/ML (ref 10–20)
AST SERPL-CCNC: 76 U/L (ref 10–40)
B-OH-BUTYR BLD STRIP-SCNC: 0.1 MMOL/L (ref 0–0.5)
BASOPHILS # BLD AUTO: 0.01 K/UL (ref 0–0.2)
BASOPHILS NFR BLD: 0.2 % (ref 0–1.9)
BILIRUB SERPL-MCNC: 1.7 MG/DL (ref 0.1–1)
BLD GP AB SCN CELLS X3 SERPL QL: NORMAL
BLD GP AB SCN CELLS X3 SERPL QL: NORMAL
BLD PROD TYP BPU: NORMAL
BLD PROD TYP BPU: NORMAL
BLOOD UNIT EXPIRATION DATE: NORMAL
BLOOD UNIT EXPIRATION DATE: NORMAL
BLOOD UNIT TYPE CODE: 6200
BLOOD UNIT TYPE CODE: 6200
BLOOD UNIT TYPE: NORMAL
BLOOD UNIT TYPE: NORMAL
BUN SERPL-MCNC: 19 MG/DL (ref 6–20)
BUN SERPL-MCNC: 36 MG/DL (ref 6–20)
BUN SERPL-MCNC: 39 MG/DL (ref 6–20)
CALCIUM SERPL-MCNC: 8.3 MG/DL (ref 8.7–10.5)
CALCIUM SERPL-MCNC: 8.5 MG/DL (ref 8.7–10.5)
CALCIUM SERPL-MCNC: 8.7 MG/DL (ref 8.7–10.5)
CHLORIDE SERPL-SCNC: 100 MMOL/L (ref 95–110)
CHLORIDE SERPL-SCNC: 94 MMOL/L (ref 95–110)
CHLORIDE SERPL-SCNC: 97 MMOL/L (ref 95–110)
CO2 SERPL-SCNC: 23 MMOL/L (ref 23–29)
CO2 SERPL-SCNC: 28 MMOL/L (ref 23–29)
CO2 SERPL-SCNC: 30 MMOL/L (ref 23–29)
CODING SYSTEM: NORMAL
CODING SYSTEM: NORMAL
CREAT SERPL-MCNC: 2.9 MG/DL (ref 0.5–1.4)
CREAT SERPL-MCNC: 5 MG/DL (ref 0.5–1.4)
CREAT SERPL-MCNC: 5.9 MG/DL (ref 0.5–1.4)
DIFFERENTIAL METHOD: ABNORMAL
DISPENSE STATUS: NORMAL
DISPENSE STATUS: NORMAL
EOSINOPHIL # BLD AUTO: 0 K/UL (ref 0–0.5)
EOSINOPHIL NFR BLD: 0.6 % (ref 0–8)
ERYTHROCYTE [DISTWIDTH] IN BLOOD BY AUTOMATED COUNT: 14.9 % (ref 11.5–14.5)
ERYTHROCYTE [DISTWIDTH] IN BLOOD BY AUTOMATED COUNT: 15.3 % (ref 11.5–14.5)
EST. GFR  (NO RACE VARIABLE): 11.1 ML/MIN/1.73 M^2
EST. GFR  (NO RACE VARIABLE): 21.3 ML/MIN/1.73 M^2
EST. GFR  (NO RACE VARIABLE): 9 ML/MIN/1.73 M^2
ESTIMATED AVG GLUCOSE: 169 MG/DL (ref 68–131)
GLUCOSE SERPL-MCNC: 161 MG/DL (ref 70–110)
GLUCOSE SERPL-MCNC: 170 MG/DL (ref 70–110)
GLUCOSE SERPL-MCNC: 178 MG/DL (ref 70–110)
GLUCOSE SERPL-MCNC: 242 MG/DL (ref 70–110)
GLUCOSE SERPL-MCNC: 330 MG/DL (ref 70–110)
GLUCOSE SERPL-MCNC: 444 MG/DL (ref 70–110)
GLUCOSE SERPL-MCNC: 500 MG/DL (ref 70–110)
GLUCOSE SERPL-MCNC: 525 MG/DL (ref 70–110)
GLUCOSE SERPL-MCNC: 537 MG/DL (ref 70–110)
GLUCOSE SERPL-MCNC: 596 MG/DL (ref 70–110)
GLUCOSE SERPL-MCNC: 754 MG/DL (ref 70–110)
GLUCOSE SERPL-MCNC: 841 MG/DL (ref 70–110)
GLUCOSE SERPL-MCNC: >600 MG/DL (ref 70–110)
HBA1C MFR BLD: 7.5 % (ref 4.5–6.2)
HCG INTACT+B SERPL-ACNC: 1.5 MIU/ML
HCT VFR BLD AUTO: 18.8 % (ref 37–48.5)
HCT VFR BLD AUTO: 24.8 % (ref 37–48.5)
HGB BLD-MCNC: 6.2 G/DL (ref 12–16)
HGB BLD-MCNC: 8.4 G/DL (ref 12–16)
IMM GRANULOCYTES # BLD AUTO: 0.06 K/UL (ref 0–0.04)
IMM GRANULOCYTES NFR BLD AUTO: 0.9 % (ref 0–0.5)
IRON SERPL-MCNC: 53 UG/DL (ref 30–160)
LACTATE SERPL-SCNC: 1 MMOL/L (ref 0.5–2.2)
LIPASE SERPL-CCNC: 40 U/L (ref 4–60)
LYMPHOCYTES # BLD AUTO: 0.3 K/UL (ref 1–4.8)
LYMPHOCYTES NFR BLD: 4.7 % (ref 18–48)
MCH RBC QN AUTO: 27.4 PG (ref 27–31)
MCH RBC QN AUTO: 27.8 PG (ref 27–31)
MCHC RBC AUTO-ENTMCNC: 33 G/DL (ref 32–36)
MCHC RBC AUTO-ENTMCNC: 33.9 G/DL (ref 32–36)
MCV RBC AUTO: 81 FL (ref 82–98)
MCV RBC AUTO: 84 FL (ref 82–98)
MONOCYTES # BLD AUTO: 0.2 K/UL (ref 0.3–1)
MONOCYTES NFR BLD: 3.1 % (ref 4–15)
NEUTROPHILS # BLD AUTO: 5.8 K/UL (ref 1.8–7.7)
NEUTROPHILS NFR BLD: 90.5 % (ref 38–73)
NRBC BLD-RTO: 0 /100 WBC
NUM UNITS TRANS PACKED RBC: NORMAL
NUM UNITS TRANS PACKED RBC: NORMAL
PLATELET # BLD AUTO: 63 K/UL (ref 150–450)
PLATELET # BLD AUTO: 89 K/UL (ref 150–450)
PLATELET BLD QL SMEAR: ABNORMAL
PMV BLD AUTO: 10.1 FL (ref 9.2–12.9)
PMV BLD AUTO: 9 FL (ref 9.2–12.9)
POCT GLUCOSE: >500 MG/DL (ref 70–110)
POTASSIUM SERPL-SCNC: 3.8 MMOL/L (ref 3.5–5.1)
POTASSIUM SERPL-SCNC: 4.3 MMOL/L (ref 3.5–5.1)
POTASSIUM SERPL-SCNC: 5 MMOL/L (ref 3.5–5.1)
PROT SERPL-MCNC: 7.2 G/DL (ref 6–8.4)
RBC # BLD AUTO: 2.23 M/UL (ref 4–5.4)
RBC # BLD AUTO: 3.07 M/UL (ref 4–5.4)
SATURATED IRON: 27 % (ref 20–50)
SODIUM SERPL-SCNC: 132 MMOL/L (ref 136–145)
SODIUM SERPL-SCNC: 133 MMOL/L (ref 136–145)
SODIUM SERPL-SCNC: 137 MMOL/L (ref 136–145)
TOTAL IRON BINDING CAPACITY: 197 UG/DL (ref 250–450)
TRANSFERRIN SERPL-MCNC: 141 MG/DL (ref 200–375)
TROPONIN I SERPL DL<=0.01 NG/ML-MCNC: 0.03 NG/ML (ref 0–0.03)
WBC # BLD AUTO: 6.41 K/UL (ref 3.9–12.7)
WBC # BLD AUTO: 7.71 K/UL (ref 3.9–12.7)

## 2022-12-23 PROCEDURE — 63600175 PHARM REV CODE 636 W HCPCS: Performed by: STUDENT IN AN ORGANIZED HEALTH CARE EDUCATION/TRAINING PROGRAM

## 2022-12-23 PROCEDURE — 96374 THER/PROPH/DIAG INJ IV PUSH: CPT | Mod: 59

## 2022-12-23 PROCEDURE — 25000003 PHARM REV CODE 250: Performed by: STUDENT IN AN ORGANIZED HEALTH CARE EDUCATION/TRAINING PROGRAM

## 2022-12-23 PROCEDURE — 84466 ASSAY OF TRANSFERRIN: CPT | Performed by: STUDENT IN AN ORGANIZED HEALTH CARE EDUCATION/TRAINING PROGRAM

## 2022-12-23 PROCEDURE — 96366 THER/PROPH/DIAG IV INF ADDON: CPT

## 2022-12-23 PROCEDURE — 85025 COMPLETE CBC W/AUTO DIFF WBC: CPT | Performed by: EMERGENCY MEDICINE

## 2022-12-23 PROCEDURE — 85027 COMPLETE CBC AUTOMATED: CPT | Performed by: STUDENT IN AN ORGANIZED HEALTH CARE EDUCATION/TRAINING PROGRAM

## 2022-12-23 PROCEDURE — 90935 HEMODIALYSIS ONE EVALUATION: CPT

## 2022-12-23 PROCEDURE — 63600175 PHARM REV CODE 636 W HCPCS: Performed by: INTERNAL MEDICINE

## 2022-12-23 PROCEDURE — P9016 RBC LEUKOCYTES REDUCED: HCPCS | Performed by: EMERGENCY MEDICINE

## 2022-12-23 PROCEDURE — 96375 TX/PRO/DX INJ NEW DRUG ADDON: CPT

## 2022-12-23 PROCEDURE — 82010 KETONE BODYS QUAN: CPT | Performed by: STUDENT IN AN ORGANIZED HEALTH CARE EDUCATION/TRAINING PROGRAM

## 2022-12-23 PROCEDURE — 93010 EKG 12-LEAD: ICD-10-PCS | Mod: ,,, | Performed by: SPECIALIST

## 2022-12-23 PROCEDURE — 82947 ASSAY GLUCOSE BLOOD QUANT: CPT | Mod: 59 | Performed by: INTERNAL MEDICINE

## 2022-12-23 PROCEDURE — 83036 HEMOGLOBIN GLYCOSYLATED A1C: CPT | Performed by: STUDENT IN AN ORGANIZED HEALTH CARE EDUCATION/TRAINING PROGRAM

## 2022-12-23 PROCEDURE — 99291 CRITICAL CARE FIRST HOUR: CPT | Mod: 25

## 2022-12-23 PROCEDURE — 96375 TX/PRO/DX INJ NEW DRUG ADDON: CPT | Mod: 59

## 2022-12-23 PROCEDURE — 63600175 PHARM REV CODE 636 W HCPCS: Performed by: EMERGENCY MEDICINE

## 2022-12-23 PROCEDURE — 96376 TX/PRO/DX INJ SAME DRUG ADON: CPT | Mod: 59

## 2022-12-23 PROCEDURE — 36415 COLL VENOUS BLD VENIPUNCTURE: CPT | Performed by: EMERGENCY MEDICINE

## 2022-12-23 PROCEDURE — 80053 COMPREHEN METABOLIC PANEL: CPT | Performed by: EMERGENCY MEDICINE

## 2022-12-23 PROCEDURE — G0378 HOSPITAL OBSERVATION PER HR: HCPCS

## 2022-12-23 PROCEDURE — 80143 DRUG ASSAY ACETAMINOPHEN: CPT | Performed by: EMERGENCY MEDICINE

## 2022-12-23 PROCEDURE — 36410 VNPNXR 3YR/> PHY/QHP DX/THER: CPT | Mod: 59

## 2022-12-23 PROCEDURE — 86920 COMPATIBILITY TEST SPIN: CPT | Performed by: EMERGENCY MEDICINE

## 2022-12-23 PROCEDURE — 36430 TRANSFUSION BLD/BLD COMPNT: CPT | Mod: 59

## 2022-12-23 PROCEDURE — 84702 CHORIONIC GONADOTROPIN TEST: CPT | Performed by: EMERGENCY MEDICINE

## 2022-12-23 PROCEDURE — 93010 ELECTROCARDIOGRAM REPORT: CPT | Mod: ,,, | Performed by: SPECIALIST

## 2022-12-23 PROCEDURE — 93005 ELECTROCARDIOGRAM TRACING: CPT

## 2022-12-23 PROCEDURE — 83690 ASSAY OF LIPASE: CPT | Performed by: EMERGENCY MEDICINE

## 2022-12-23 PROCEDURE — 82947 ASSAY GLUCOSE BLOOD QUANT: CPT | Mod: 91 | Performed by: EMERGENCY MEDICINE

## 2022-12-23 PROCEDURE — P9016 RBC LEUKOCYTES REDUCED: HCPCS | Performed by: STUDENT IN AN ORGANIZED HEALTH CARE EDUCATION/TRAINING PROGRAM

## 2022-12-23 PROCEDURE — 86900 BLOOD TYPING SEROLOGIC ABO: CPT | Mod: 91 | Performed by: STUDENT IN AN ORGANIZED HEALTH CARE EDUCATION/TRAINING PROGRAM

## 2022-12-23 PROCEDURE — 96372 THER/PROPH/DIAG INJ SC/IM: CPT | Mod: 59 | Performed by: STUDENT IN AN ORGANIZED HEALTH CARE EDUCATION/TRAINING PROGRAM

## 2022-12-23 PROCEDURE — 96365 THER/PROPH/DIAG IV INF INIT: CPT

## 2022-12-23 PROCEDURE — 86901 BLOOD TYPING SEROLOGIC RH(D): CPT | Performed by: EMERGENCY MEDICINE

## 2022-12-23 PROCEDURE — 25000003 PHARM REV CODE 250: Performed by: EMERGENCY MEDICINE

## 2022-12-23 PROCEDURE — 80048 BASIC METABOLIC PNL TOTAL CA: CPT | Mod: 91 | Performed by: STUDENT IN AN ORGANIZED HEALTH CARE EDUCATION/TRAINING PROGRAM

## 2022-12-23 PROCEDURE — 83605 ASSAY OF LACTIC ACID: CPT | Performed by: EMERGENCY MEDICINE

## 2022-12-23 PROCEDURE — 86920 COMPATIBILITY TEST SPIN: CPT | Performed by: STUDENT IN AN ORGANIZED HEALTH CARE EDUCATION/TRAINING PROGRAM

## 2022-12-23 PROCEDURE — G0379 DIRECT REFER HOSPITAL OBSERV: HCPCS

## 2022-12-23 PROCEDURE — 96372 THER/PROPH/DIAG INJ SC/IM: CPT | Performed by: STUDENT IN AN ORGANIZED HEALTH CARE EDUCATION/TRAINING PROGRAM

## 2022-12-23 PROCEDURE — 84484 ASSAY OF TROPONIN QUANT: CPT | Performed by: EMERGENCY MEDICINE

## 2022-12-23 PROCEDURE — 36430 TRANSFUSION BLD/BLD COMPNT: CPT

## 2022-12-23 PROCEDURE — 82962 GLUCOSE BLOOD TEST: CPT

## 2022-12-23 PROCEDURE — 21400001 HC TELEMETRY ROOM

## 2022-12-23 RX ORDER — GLUCAGON 1 MG
1 KIT INJECTION
Status: DISCONTINUED | OUTPATIENT
Start: 2022-12-23 | End: 2022-12-24 | Stop reason: HOSPADM

## 2022-12-23 RX ORDER — CALCIUM GLUCONATE 20 MG/ML
3 INJECTION, SOLUTION INTRAVENOUS
Status: DISCONTINUED | OUTPATIENT
Start: 2022-12-23 | End: 2022-12-24 | Stop reason: HOSPADM

## 2022-12-23 RX ORDER — MORPHINE SULFATE 4 MG/ML
4 INJECTION, SOLUTION INTRAMUSCULAR; INTRAVENOUS
Status: COMPLETED | OUTPATIENT
Start: 2022-12-23 | End: 2022-12-23

## 2022-12-23 RX ORDER — ONDANSETRON 2 MG/ML
INJECTION INTRAMUSCULAR; INTRAVENOUS
Status: DISPENSED
Start: 2022-12-23 | End: 2022-12-23

## 2022-12-23 RX ORDER — LEVETIRACETAM 500 MG/1
500 TABLET ORAL 2 TIMES DAILY
Status: DISCONTINUED | OUTPATIENT
Start: 2022-12-23 | End: 2022-12-24 | Stop reason: HOSPADM

## 2022-12-23 RX ORDER — PROCHLORPERAZINE EDISYLATE 5 MG/ML
10 INJECTION INTRAMUSCULAR; INTRAVENOUS
Status: COMPLETED | OUTPATIENT
Start: 2022-12-23 | End: 2022-12-23

## 2022-12-23 RX ORDER — NICARDIPINE HYDROCHLORIDE 0.2 MG/ML
INJECTION INTRAVENOUS
Status: DISPENSED
Start: 2022-12-23 | End: 2022-12-23

## 2022-12-23 RX ORDER — HEPARIN SODIUM 1000 [USP'U]/ML
4000 INJECTION, SOLUTION INTRAVENOUS; SUBCUTANEOUS
Status: DISCONTINUED | OUTPATIENT
Start: 2022-12-23 | End: 2022-12-24 | Stop reason: HOSPADM

## 2022-12-23 RX ORDER — AMLODIPINE BESYLATE 5 MG/1
10 TABLET ORAL DAILY
Status: DISCONTINUED | OUTPATIENT
Start: 2022-12-23 | End: 2022-12-24 | Stop reason: HOSPADM

## 2022-12-23 RX ORDER — HYDRALAZINE HYDROCHLORIDE 20 MG/ML
INJECTION INTRAMUSCULAR; INTRAVENOUS
Status: DISPENSED
Start: 2022-12-23 | End: 2022-12-23

## 2022-12-23 RX ORDER — MIRTAZAPINE 15 MG/1
15 TABLET, FILM COATED ORAL NIGHTLY
Status: DISCONTINUED | OUTPATIENT
Start: 2022-12-23 | End: 2022-12-24 | Stop reason: HOSPADM

## 2022-12-23 RX ORDER — ONDANSETRON 2 MG/ML
4 INJECTION INTRAMUSCULAR; INTRAVENOUS
Status: COMPLETED | OUTPATIENT
Start: 2022-12-23 | End: 2022-12-23

## 2022-12-23 RX ORDER — SODIUM CHLORIDE 0.9 % (FLUSH) 0.9 %
10 SYRINGE (ML) INJECTION
Status: DISCONTINUED | OUTPATIENT
Start: 2022-12-23 | End: 2022-12-24 | Stop reason: HOSPADM

## 2022-12-23 RX ORDER — POTASSIUM CHLORIDE 7.45 MG/ML
40 INJECTION INTRAVENOUS
Status: DISCONTINUED | OUTPATIENT
Start: 2022-12-23 | End: 2022-12-24 | Stop reason: HOSPADM

## 2022-12-23 RX ORDER — PANTOPRAZOLE SODIUM 40 MG/1
40 TABLET, DELAYED RELEASE ORAL DAILY
Status: DISCONTINUED | OUTPATIENT
Start: 2022-12-23 | End: 2022-12-24 | Stop reason: HOSPADM

## 2022-12-23 RX ORDER — POTASSIUM CHLORIDE 7.45 MG/ML
60 INJECTION INTRAVENOUS
Status: DISCONTINUED | OUTPATIENT
Start: 2022-12-23 | End: 2022-12-24 | Stop reason: HOSPADM

## 2022-12-23 RX ORDER — HYDROCODONE BITARTRATE AND ACETAMINOPHEN 500; 5 MG/1; MG/1
TABLET ORAL
Status: DISCONTINUED | OUTPATIENT
Start: 2022-12-23 | End: 2022-12-23 | Stop reason: HOSPADM

## 2022-12-23 RX ORDER — PROCHLORPERAZINE EDISYLATE 5 MG/ML
INJECTION INTRAMUSCULAR; INTRAVENOUS
Status: DISPENSED
Start: 2022-12-23 | End: 2022-12-23

## 2022-12-23 RX ORDER — IBUPROFEN 200 MG
24 TABLET ORAL
Status: DISCONTINUED | OUTPATIENT
Start: 2022-12-23 | End: 2022-12-24 | Stop reason: HOSPADM

## 2022-12-23 RX ORDER — DICYCLOMINE HYDROCHLORIDE 10 MG/1
10 CAPSULE ORAL 3 TIMES DAILY
Status: DISCONTINUED | OUTPATIENT
Start: 2022-12-23 | End: 2022-12-24 | Stop reason: HOSPADM

## 2022-12-23 RX ORDER — MAGNESIUM SULFATE HEPTAHYDRATE 40 MG/ML
4 INJECTION, SOLUTION INTRAVENOUS
Status: DISCONTINUED | OUTPATIENT
Start: 2022-12-23 | End: 2022-12-24 | Stop reason: HOSPADM

## 2022-12-23 RX ORDER — DEXTROSE MONOHYDRATE 100 MG/ML
INJECTION, SOLUTION INTRAVENOUS
Status: DISCONTINUED | OUTPATIENT
Start: 2022-12-23 | End: 2022-12-24 | Stop reason: HOSPADM

## 2022-12-23 RX ORDER — MORPHINE SULFATE 2 MG/ML
2 INJECTION, SOLUTION INTRAMUSCULAR; INTRAVENOUS EVERY 4 HOURS PRN
Status: DISCONTINUED | OUTPATIENT
Start: 2022-12-23 | End: 2022-12-24 | Stop reason: HOSPADM

## 2022-12-23 RX ORDER — MAGNESIUM SULFATE HEPTAHYDRATE 40 MG/ML
2 INJECTION, SOLUTION INTRAVENOUS
Status: DISCONTINUED | OUTPATIENT
Start: 2022-12-23 | End: 2022-12-24 | Stop reason: HOSPADM

## 2022-12-23 RX ORDER — POTASSIUM CHLORIDE 7.45 MG/ML
80 INJECTION INTRAVENOUS
Status: DISCONTINUED | OUTPATIENT
Start: 2022-12-23 | End: 2022-12-24 | Stop reason: HOSPADM

## 2022-12-23 RX ORDER — IBUPROFEN 200 MG
16 TABLET ORAL
Status: DISCONTINUED | OUTPATIENT
Start: 2022-12-23 | End: 2022-12-24 | Stop reason: HOSPADM

## 2022-12-23 RX ORDER — GABAPENTIN 100 MG/1
100 CAPSULE ORAL 2 TIMES DAILY
Status: DISCONTINUED | OUTPATIENT
Start: 2022-12-23 | End: 2022-12-24 | Stop reason: HOSPADM

## 2022-12-23 RX ORDER — CALCIUM GLUCONATE 20 MG/ML
1 INJECTION, SOLUTION INTRAVENOUS
Status: DISCONTINUED | OUTPATIENT
Start: 2022-12-23 | End: 2022-12-24 | Stop reason: HOSPADM

## 2022-12-23 RX ORDER — FLUOXETINE HYDROCHLORIDE 20 MG/1
20 CAPSULE ORAL DAILY
Status: DISCONTINUED | OUTPATIENT
Start: 2022-12-23 | End: 2022-12-24 | Stop reason: HOSPADM

## 2022-12-23 RX ORDER — NICARDIPINE HYDROCHLORIDE 0.2 MG/ML
5 INJECTION INTRAVENOUS CONTINUOUS
Status: DISCONTINUED | OUTPATIENT
Start: 2022-12-23 | End: 2022-12-23 | Stop reason: HOSPADM

## 2022-12-23 RX ORDER — ISOSORBIDE MONONITRATE 60 MG/1
120 TABLET, EXTENDED RELEASE ORAL DAILY
Status: DISCONTINUED | OUTPATIENT
Start: 2022-12-23 | End: 2022-12-24 | Stop reason: HOSPADM

## 2022-12-23 RX ORDER — CARVEDILOL 12.5 MG/1
25 TABLET ORAL 2 TIMES DAILY
Status: DISCONTINUED | OUTPATIENT
Start: 2022-12-23 | End: 2022-12-24 | Stop reason: HOSPADM

## 2022-12-23 RX ORDER — INSULIN ASPART 100 [IU]/ML
1-10 INJECTION, SOLUTION INTRAVENOUS; SUBCUTANEOUS
Status: DISCONTINUED | OUTPATIENT
Start: 2022-12-23 | End: 2022-12-24 | Stop reason: HOSPADM

## 2022-12-23 RX ORDER — NICARDIPINE HYDROCHLORIDE 0.2 MG/ML
0-15 INJECTION INTRAVENOUS CONTINUOUS
Status: DISCONTINUED | OUTPATIENT
Start: 2022-12-23 | End: 2022-12-23

## 2022-12-23 RX ORDER — HYDRALAZINE HYDROCHLORIDE 20 MG/ML
10 INJECTION INTRAMUSCULAR; INTRAVENOUS ONCE
Status: COMPLETED | OUTPATIENT
Start: 2022-12-23 | End: 2022-12-23

## 2022-12-23 RX ORDER — HYDRALAZINE HYDROCHLORIDE 25 MG/1
100 TABLET, FILM COATED ORAL EVERY 8 HOURS
Status: DISCONTINUED | OUTPATIENT
Start: 2022-12-23 | End: 2022-12-24 | Stop reason: HOSPADM

## 2022-12-23 RX ORDER — MORPHINE SULFATE 4 MG/ML
4 INJECTION, SOLUTION INTRAMUSCULAR; INTRAVENOUS
Status: DISCONTINUED | OUTPATIENT
Start: 2022-12-23 | End: 2022-12-23 | Stop reason: HOSPADM

## 2022-12-23 RX ORDER — CLONIDINE 0.2 MG/24H
1 PATCH, EXTENDED RELEASE TRANSDERMAL
Status: DISCONTINUED | OUTPATIENT
Start: 2022-12-23 | End: 2022-12-24 | Stop reason: HOSPADM

## 2022-12-23 RX ORDER — CALCIUM GLUCONATE 20 MG/ML
2 INJECTION, SOLUTION INTRAVENOUS
Status: DISCONTINUED | OUTPATIENT
Start: 2022-12-23 | End: 2022-12-24 | Stop reason: HOSPADM

## 2022-12-23 RX ORDER — MORPHINE SULFATE 4 MG/ML
INJECTION, SOLUTION INTRAMUSCULAR; INTRAVENOUS
Status: DISPENSED
Start: 2022-12-23 | End: 2022-12-23

## 2022-12-23 RX ADMIN — MORPHINE SULFATE 4 MG: 4 INJECTION INTRAVENOUS at 03:12

## 2022-12-23 RX ADMIN — ISOSORBIDE MONONITRATE 120 MG: 60 TABLET, EXTENDED RELEASE ORAL at 09:12

## 2022-12-23 RX ADMIN — HYDRALAZINE HYDROCHLORIDE 10 MG: 20 INJECTION INTRAMUSCULAR; INTRAVENOUS at 03:12

## 2022-12-23 RX ADMIN — DICYCLOMINE HYDROCHLORIDE 10 MG: 10 CAPSULE ORAL at 02:12

## 2022-12-23 RX ADMIN — CARVEDILOL 25 MG: 25 TABLET, FILM COATED ORAL at 09:12

## 2022-12-23 RX ADMIN — MORPHINE SULFATE 2 MG: 2 INJECTION, SOLUTION INTRAMUSCULAR; INTRAVENOUS at 07:12

## 2022-12-23 RX ADMIN — GABAPENTIN 100 MG: 100 CAPSULE ORAL at 09:12

## 2022-12-23 RX ADMIN — HEPARIN SODIUM 4000 UNITS: 1000 INJECTION, SOLUTION INTRAVENOUS; SUBCUTANEOUS at 02:12

## 2022-12-23 RX ADMIN — MORPHINE SULFATE 2 MG: 2 INJECTION, SOLUTION INTRAMUSCULAR; INTRAVENOUS at 09:12

## 2022-12-23 RX ADMIN — MORPHINE SULFATE 2 MG: 2 INJECTION, SOLUTION INTRAMUSCULAR; INTRAVENOUS at 01:12

## 2022-12-23 RX ADMIN — INSULIN DETEMIR 9 UNITS: 100 INJECTION, SOLUTION SUBCUTANEOUS at 01:12

## 2022-12-23 RX ADMIN — HYDRALAZINE HYDROCHLORIDE 100 MG: 25 TABLET ORAL at 10:12

## 2022-12-23 RX ADMIN — FLUOXETINE 20 MG: 20 CAPSULE ORAL at 09:12

## 2022-12-23 RX ADMIN — MORPHINE SULFATE 2 MG: 2 INJECTION, SOLUTION INTRAMUSCULAR; INTRAVENOUS at 11:12

## 2022-12-23 RX ADMIN — INSULIN HUMAN 10 UNITS: 100 INJECTION, SOLUTION PARENTERAL at 05:12

## 2022-12-23 RX ADMIN — INSULIN HUMAN 1 UNITS/HR: 1 INJECTION, SOLUTION INTRAVENOUS at 10:12

## 2022-12-23 RX ADMIN — PANTOPRAZOLE SODIUM 40 MG: 40 TABLET, DELAYED RELEASE ORAL at 10:12

## 2022-12-23 RX ADMIN — LEVETIRACETAM 500 MG: 500 TABLET, FILM COATED ORAL at 09:12

## 2022-12-23 RX ADMIN — PROCHLORPERAZINE EDISYLATE 10 MG: 5 INJECTION INTRAMUSCULAR; INTRAVENOUS at 03:12

## 2022-12-23 RX ADMIN — HYDRALAZINE HYDROCHLORIDE 100 MG: 25 TABLET ORAL at 01:12

## 2022-12-23 RX ADMIN — ONDANSETRON 4 MG: 2 INJECTION INTRAMUSCULAR; INTRAVENOUS at 05:12

## 2022-12-23 RX ADMIN — MIRTAZAPINE 15 MG: 15 TABLET, FILM COATED ORAL at 09:12

## 2022-12-23 RX ADMIN — AMLODIPINE BESYLATE 10 MG: 5 TABLET ORAL at 09:12

## 2022-12-23 RX ADMIN — CLONIDINE 1 PATCH: 0.2 PATCH TRANSDERMAL at 10:12

## 2022-12-23 RX ADMIN — INSULIN HUMAN 10 UNITS: 100 INJECTION, SOLUTION PARENTERAL at 09:12

## 2022-12-23 RX ADMIN — INSULIN ASPART 1 UNITS: 100 INJECTION, SOLUTION INTRAVENOUS; SUBCUTANEOUS at 09:12

## 2022-12-23 RX ADMIN — NICARDIPINE HYDROCHLORIDE 5 MG/HR: 0.2 INJECTION, SOLUTION INTRAVENOUS at 05:12

## 2022-12-23 RX ADMIN — DICYCLOMINE HYDROCHLORIDE 10 MG: 10 CAPSULE ORAL at 09:12

## 2022-12-23 NOTE — ASSESSMENT & PLAN NOTE
IV insulin GGT at least until sugars a down to the 200s   BMP q.4h, glucose check q.1h  Once stable, resume home medication of Levemir and titrate upwards as tolerated   Patient's FSGs are uncontrolled due to hyperglycemia on current medication regimen.  Last A1c reviewed-   Lab Results   Component Value Date    HGBA1C 6.2 08/24/2022     Most recent fingerstick glucose reviewed-   Recent Labs   Lab 12/23/22  0614   POCTGLUCOSE >500*     Current correctional scale  Medium  Maintain anti-hyperglycemic dose as follows-   Antihyperglycemics (From admission, onward)    Start     Stop Route Frequency Ordered    12/23/22 1415  insulin detemir U-100 pen 9 Units         -- SubQ Daily 12/23/22 1311    12/23/22 1030  insulin regular in 0.9 % NaCl 100 unit/100 mL (1 unit/mL) infusion        Question Answer Comment   Insulin Rate Adjustment (DO NOT MODIFY ANSWER) [email or web address] [id number]    Enter initial dose from Infusion Protocol Chart (Units/hr): 1        -- IV Continuous 12/23/22 0926        Hold Oral hypoglycemics while patient is in the hospital.

## 2022-12-23 NOTE — SUBJECTIVE & OBJECTIVE
Past Medical History:   Diagnosis Date    CKD (chronic kidney disease), stage IV 2022    Diabetes mellitus due to underlying condition with unspecified complications 2022    Gastroparesis 2022    Heart failure with preserved ejection fraction 2022    EF 55% on 3/22    History of gastroesophageal reflux (GERD)     History of supraventricular tachycardia     Hyperkalemia 2022    Hypertensive emergency 2022    Sickle cell trait 2022    Type 2 diabetes mellitus        Past Surgical History:   Procedure Laterality Date     SECTION      x 3    COLONOSCOPY      COLONOSCOPY N/A 2022    Procedure: COLONOSCOPY;  Surgeon: Jagdeep Cedeno MD;  Location: [facility];  Service: Endoscopy;  Laterality: N/A;    ESOPHAGOGASTRODUODENOSCOPY N/A 10/18/2019    Procedure: ESOPHAGOGASTRODUODENOSCOPY (EGD);  Surgeon: Gianluca Mendez MD;  Location: [facility];  Service: Endoscopy;  Laterality: N/A;    ESOPHAGOGASTRODUODENOSCOPY N/A 2022    Procedure: EGD (ESOPHAGOGASTRODUODENOSCOPY);  Surgeon: Micky Paredes III, MD;  Location: [facility];  Service: Endoscopy;  Laterality: N/A;    ESOPHAGOGASTRODUODENOSCOPY N/A 2022    Procedure: EGD (ESOPHAGOGASTRODUODENOSCOPY);  Surgeon: Marcelo Zhong MD;  Location: [facility];  Service: Endoscopy;  Laterality: N/A;    LAPAROSCOPIC CHOLECYSTECTOMY N/A 2022    Procedure: CHOLECYSTECTOMY, LAPAROSCOPIC;  Surgeon: Grey Perez MD;  Location: [facility];  Service: General;  Laterality: N/A;    PLACEMENT OF DUAL-LUMEN VASCULAR CATHETER Left 2022    Procedure: INSERTION-CATHETER-JOSEPH;  Surgeon: Dionte Gan MD;  Location: [facility] OR;  Service: General;  Laterality: Left;    PLACEMENT OF DUAL-LUMEN VASCULAR CATHETER Right 2022    Procedure: INSERTION-CATHETER-Hemosplit;  Surgeon: Dionte Gan MD;  Location: [facility] OR;  Service: General;  Laterality: Right;       Review of patient's allergies indicates:   Allergen Reactions     Penicillins Hives       Current Facility-Administered Medications on File Prior to Encounter   Medication    [COMPLETED] hydrALAZINE injection 10 mg    [COMPLETED] insulin regular injection 10 Units 0.1 mL    [COMPLETED] morphine injection 4 mg    [COMPLETED] ondansetron injection 4 mg    [COMPLETED] prochlorperazine injection Soln 10 mg    [DISCONTINUED] 0.9%  NaCl infusion (for blood administration)    [DISCONTINUED] morphine injection 4 mg    [DISCONTINUED] niCARdipine 40 mg/200 mL (0.2 mg/mL) infusion     Current Outpatient Medications on File Prior to Encounter   Medication Sig    albuterol (PROVENTIL/VENTOLIN HFA) 90 mcg/actuation inhaler Inhale 2 puffs into the lungs every 6 (six) hours as needed for Wheezing. Rescue    amLODIPine (NORVASC) 10 MG tablet Take 1 tablet (10 mg total) by mouth once daily.    apixaban (ELIQUIS) 5 mg Tab Take 1 tablet (5 mg total) by mouth 2 (two) times daily.    carvediloL (COREG) 25 MG tablet Take 1 tablet (25 mg total) by mouth 2 (two) times daily.    cloNIDine 0.2 mg/24 hr td ptwk (CATAPRES) 0.2 mg/24 hr Place 1 patch onto the skin every 7 days.    dicyclomine (BENTYL) 10 MG capsule Take 1 capsule (10 mg total) by mouth 3 (three) times daily.    FLUoxetine 20 MG capsule Take 1 capsule (20 mg total) by mouth once daily.    gabapentin (NEURONTIN) 100 MG capsule Take 1 capsule (100 mg total) by mouth 2 (two) times daily.    hydrALAZINE (APRESOLINE) 100 MG tablet Take 1 tablet (100 mg total) by mouth every 8 (eight) hours.    insulin aspart U-100 (NOVOLOG) 100 unit/mL (3 mL) InPn pen Inject 1-10 Units into the skin before meals and at bedtime as needed (Hyperglycemia). Max daily dose 40 units.    insulin detemir U-100 (LEVEMIR FLEXTOUCH) 100 unit/mL (3 mL) SubQ InPn pen Inject 9 Units into the skin once daily.    isosorbide mononitrate (IMDUR) 60 MG 24 hr tablet Take 2 tablets (120 mg total) by mouth once daily.    levETIRAcetam (KEPPRA) 500 MG Tab Take 1 tablet (500 mg total) by  "mouth 2 (two) times daily.    mirtazapine (REMERON SOL-TAB) 15 MG disintegrating tablet Take 1 tablet (15 mg total) by mouth nightly.    ondansetron (ZOFRAN) 4 MG tablet Take 1 tablet (4 mg total) by mouth every 8 (eight) hours as needed for Nausea.    pantoprazole (PROTONIX) 40 MG tablet Take 40 mg by mouth once daily.    pen needle, diabetic (BD ULTRA-FINE MAGALIE PEN NEEDLE) 32 gauge x 5/32" Ndle Inject into the skin 5 times per day with insulin    promethazine (PHENERGAN) 25 MG tablet Take 1 tablet (25 mg total) by mouth every 12 (twelve) hours as needed (nausea/vomiting not relieved with ondansetron (zofran)).    [DISCONTINUED] atenoloL (TENORMIN) 50 MG tablet Take 1 tablet (50 mg total) by mouth every other day.    [DISCONTINUED] omeprazole (PRILOSEC) 20 MG capsule Take 2 capsules (40 mg total) by mouth once daily. for 10 days    [DISCONTINUED] torsemide (DEMADEX) 20 MG Tab Take 1 tablet (20 mg total) by mouth 2 (two) times a day. (Patient taking differently: Take 20 mg by mouth once daily.)     Family History       Problem Relation (Age of Onset)    Diabetes Mother, Father          Tobacco Use    Smoking status: Never    Smokeless tobacco: Never   Substance and Sexual Activity    Alcohol use: Not Currently    Drug use: No    Sexual activity: Not Currently     Partners: Male     Birth control/protection: I.U.D.     Review of Systems   Constitutional:  Negative for fatigue and fever.   Respiratory:  Negative for shortness of breath.    Cardiovascular:  Negative for chest pain.   Gastrointestinal:  Positive for abdominal pain, nausea and vomiting.   Neurological:  Negative for syncope.   Objective:     Vital Signs (Most Recent):  Temp: 98.6 °F (37 °C) (12/23/22 1300)  Pulse: 79 (12/23/22 1300)  Resp: (!) 8 (12/23/22 1300)  BP: (!) 152/93 (12/23/22 1300)  SpO2: 97 % (12/23/22 1300)   Vital Signs (24h Range):  Temp:  [96.5 °F (35.8 °C)-99.1 °F (37.3 °C)] 98.6 °F (37 °C)  Pulse:  [79-96] 79  Resp:  [2-22] 8  SpO2:  " [84 %-100 %] 97 %  BP: (121-228)/() 152/93     Weight: 59.9 kg (132 lb 0.9 oz)  Body mass index is 24.15 kg/m².    Physical Exam  Vitals reviewed.   HENT:      Mouth/Throat:      Mouth: Mucous membranes are dry.   Cardiovascular:      Rate and Rhythm: Normal rate.      Pulses: Normal pulses.   Pulmonary:      Effort: Pulmonary effort is normal.   Abdominal:      Tenderness: There is abdominal tenderness.   Musculoskeletal:         General: Normal range of motion.   Skin:     General: Skin is warm.   Neurological:      General: No focal deficit present.      Mental Status: She is alert and oriented to person, place, and time. Mental status is at baseline.           Significant Labs: All pertinent labs within the past 24 hours have been reviewed.  CBC:   Recent Labs   Lab 12/23/22  0350   WBC 6.41   HGB 6.2*   HCT 18.8*   PLT 63*     CMP:   Recent Labs   Lab 12/23/22  0350 12/23/22  0656 12/23/22  1040 12/23/22  1125   *  --   --  133*   K 5.0  --   --  4.3   CL 94*  --   --  97   CO2 23  --   --  28   * 754* 537* 444*   BUN 36*  --   --  39*   CREATININE 5.9*  --   --  5.0*   CALCIUM 8.7  --   --  8.3*   PROT 7.2  --   --   --    ALBUMIN 3.4*  --   --   --    BILITOT 1.7*  --   --   --    ALKPHOS 337*  --   --   --    AST 76*  --   --   --    ALT 97*  --   --   --    ANIONGAP 15  --   --  8     Cardiac Markers: No results for input(s): CKMB, MYOGLOBIN, BNP, TROPISTAT in the last 48 hours.  Magnesium: No results for input(s): MG in the last 48 hours.  Troponin:   Recent Labs   Lab 12/23/22  0350   TROPONINI 0.033*     Urine Studies: No results for input(s): COLORU, APPEARANCEUA, PHUR, SPECGRAV, PROTEINUA, GLUCUA, KETONESU, BILIRUBINUA, OCCULTUA, NITRITE, UROBILINOGEN, LEUKOCYTESUR, RBCUA, WBCUA, BACTERIA, SQUAMEPITHEL, HYALINECASTS in the last 48 hours.    Invalid input(s): HAY    Significant Imaging: I have reviewed all pertinent imaging results/findings within the past 24 hours.

## 2022-12-23 NOTE — ASSESSMENT & PLAN NOTE
Patient has a current diagnosis of hypertensive urgency (without evidence of end organ damage) which is controlled.  Latest blood pressure and vitals reviewed-   Temp:  [96.5 °F (35.8 °C)-99.1 °F (37.3 °C)]   Pulse:  [79-96]   Resp:  [2-22]   BP: (121-228)/()   SpO2:  [84 %-100 %] .   Patient currently off IV antihypertensives.   Home meds for hypertension were reviewed and noted below.   Hypertension Medications             amLODIPine (NORVASC) 10 MG tablet Take 1 tablet (10 mg total) by mouth once daily.    carvediloL (COREG) 25 MG tablet Take 1 tablet (25 mg total) by mouth 2 (two) times daily.    cloNIDine 0.2 mg/24 hr td ptwk (CATAPRES) 0.2 mg/24 hr Place 1 patch onto the skin every 7 days.    hydrALAZINE (APRESOLINE) 100 MG tablet Take 1 tablet (100 mg total) by mouth every 8 (eight) hours.    isosorbide mononitrate (IMDUR) 60 MG 24 hr tablet Take 2 tablets (120 mg total) by mouth once daily.          Medication adjustment for hospital antihypertensives is as follows- resume home blood pressure medications    Will aim for controlled BP reduction by medications noted above. Monitor and mitigate end organ damage as indicated.

## 2022-12-23 NOTE — H&P
[facility] Medicine  History & Physical    Patient Name: Tabby Howard  MRN: [id number]  Patient Class: IP- Inpatient  Admission Date: 2022  Attending Physician: Shakeel Humphries MD  Primary Care Provider: [facility]         Patient information was obtained from patient and ER records.     Subjective:     Principal Problem:Hypertensive urgency    Chief Complaint: No chief complaint on file.       HPI: The patient is a 33 y/o female with PMH of ESRD on HD TTS, HTN, seizure disorder, and CHF. She presented with abdominal pain. She last had HD on this past Tuesday but missed her session this past Thursday. Hb 6.2, troponin 0.033, and glucose of 841. Patient has had a history of presenting with abd pain. Given morphine and IV hydralazine. Started on nicardipine drip and also given antiemetics. Patient not in DKA. Given 10 units of insulin.  Patient was subsequently transferred from [facility] to [facility].  On presentation to [facility], glucose was on made a will, although patient is not in DKA, will start patient on IV insulin GGT in the meantime.  Blood pressure is now in the 140s.  Discontinue IV nicardipine drip and consult Nephrology for dialysis.      Past Medical History:   Diagnosis Date    CKD (chronic kidney disease), stage IV 2022    Diabetes mellitus due to underlying condition with unspecified complications 2022    Gastroparesis 2022    Heart failure with preserved ejection fraction 2022    EF 55% on 3/22    History of gastroesophageal reflux (GERD)     History of supraventricular tachycardia     Hyperkalemia 2022    Hypertensive emergency 2022    Sickle cell trait 2022    Type 2 diabetes mellitus        Past Surgical History:   Procedure Laterality Date     SECTION      x 3    COLONOSCOPY      COLONOSCOPY N/A 2022    Procedure:  COLONOSCOPY;  Surgeon: Jagdeep Cedeno MD;  [facility];  Service: Endoscopy;  Laterality: N/A;    ESOPHAGOGASTRODUODENOSCOPY N/A 10/18/2019    Procedure: ESOPHAGOGASTRODUODENOSCOPY (EGD);  Surgeon: Gianluca Mendez MD;  Location: [facility] ENDO;  Service: Endoscopy;  Laterality: N/A;    ESOPHAGOGASTRODUODENOSCOPY N/A 08/24/2022    Procedure: EGD (ESOPHAGOGASTRODUODENOSCOPY);  Surgeon: Micky Paredes III, MD;  Location: [facility] ENDO;  Service: Endoscopy;  Laterality: N/A;    ESOPHAGOGASTRODUODENOSCOPY N/A 12/5/2022    Procedure: EGD (ESOPHAGOGASTRODUODENOSCOPY);  Surgeon: Marcelo Zhong MD;  Location: [facility];  Service: Endoscopy;  Laterality: N/A;    LAPAROSCOPIC CHOLECYSTECTOMY N/A 07/30/2022    Procedure: CHOLECYSTECTOMY, LAPAROSCOPIC;  Surgeon: Grey Perez MD;  Location: [facility] OR;  Service: General;  Laterality: N/A;    PLACEMENT OF DUAL-LUMEN VASCULAR CATHETER Left 07/12/2022    Procedure: INSERTION-CATHETER-JOSEPH;  Surgeon: Dionte Gan MD;  Location: [facility] OR;  Service: General;  Laterality: Left;    PLACEMENT OF DUAL-LUMEN VASCULAR CATHETER Right 07/26/2022    Procedure: INSERTION-CATHETER-Hemosplit;  Surgeon: Dionte Gan MD;  Location: [facility] OR;  Service: General;  Laterality: Right;       Review of patient's allergies indicates:   Allergen Reactions    Penicillins Hives       Current Facility-Administered Medications on File Prior to Encounter   Medication    [COMPLETED] hydrALAZINE injection 10 mg    [COMPLETED] insulin regular injection 10 Units 0.1 mL    [COMPLETED] morphine injection 4 mg    [COMPLETED] ondansetron injection 4 mg    [COMPLETED] prochlorperazine injection Soln 10 mg    [DISCONTINUED] 0.9%  NaCl infusion (for blood administration)    [DISCONTINUED] morphine injection 4 mg    [DISCONTINUED] niCARdipine 40 mg/200 mL (0.2 mg/mL) infusion     Current Outpatient Medications on File Prior to Encounter   Medication Sig    albuterol (PROVENTIL/VENTOLIN HFA) 90  "mcg/actuation inhaler Inhale 2 puffs into the lungs every 6 (six) hours as needed for Wheezing. Rescue    amLODIPine (NORVASC) 10 MG tablet Take 1 tablet (10 mg total) by mouth once daily.    apixaban (ELIQUIS) 5 mg Tab Take 1 tablet (5 mg total) by mouth 2 (two) times daily.    carvediloL (COREG) 25 MG tablet Take 1 tablet (25 mg total) by mouth 2 (two) times daily.    cloNIDine 0.2 mg/24 hr td ptwk (CATAPRES) 0.2 mg/24 hr Place 1 patch onto the skin every 7 days.    dicyclomine (BENTYL) 10 MG capsule Take 1 capsule (10 mg total) by mouth 3 (three) times daily.    FLUoxetine 20 MG capsule Take 1 capsule (20 mg total) by mouth once daily.    gabapentin (NEURONTIN) 100 MG capsule Take 1 capsule (100 mg total) by mouth 2 (two) times daily.    hydrALAZINE (APRESOLINE) 100 MG tablet Take 1 tablet (100 mg total) by mouth every 8 (eight) hours.    insulin aspart U-100 (NOVOLOG) 100 unit/mL (3 mL) InPn pen Inject 1-10 Units into the skin before meals and at bedtime as needed (Hyperglycemia). Max daily dose 40 units.    insulin detemir U-100 (LEVEMIR FLEXTOUCH) 100 unit/mL (3 mL) SubQ InPn pen Inject 9 Units into the skin once daily.    isosorbide mononitrate (IMDUR) 60 MG 24 hr tablet Take 2 tablets (120 mg total) by mouth once daily.    levETIRAcetam (KEPPRA) 500 MG Tab Take 1 tablet (500 mg total) by mouth 2 (two) times daily.    mirtazapine (REMERON SOL-TAB) 15 MG disintegrating tablet Take 1 tablet (15 mg total) by mouth nightly.    ondansetron (ZOFRAN) 4 MG tablet Take 1 tablet (4 mg total) by mouth every 8 (eight) hours as needed for Nausea.    pantoprazole (PROTONIX) 40 MG tablet Take 40 mg by mouth once daily.    pen needle, diabetic (BD ULTRA-FINE MAGALIE PEN NEEDLE) 32 gauge x 5/32" Ndle Inject into the skin 5 times per day with insulin    promethazine (PHENERGAN) 25 MG tablet Take 1 tablet (25 mg total) by mouth every 12 (twelve) hours as needed (nausea/vomiting not relieved with ondansetron " (zofran)).    [DISCONTINUED] atenoloL (TENORMIN) 50 MG tablet Take 1 tablet (50 mg total) by mouth every other day.    [DISCONTINUED] omeprazole (PRILOSEC) 20 MG capsule Take 2 capsules (40 mg total) by mouth once daily. for 10 days    [DISCONTINUED] torsemide (DEMADEX) 20 MG Tab Take 1 tablet (20 mg total) by mouth 2 (two) times a day. (Patient taking differently: Take 20 mg by mouth once daily.)     Family History       Problem Relation (Age of Onset)    Diabetes Mother, Father          Tobacco Use    Smoking status: Never    Smokeless tobacco: Never   Substance and Sexual Activity    Alcohol use: Not Currently    Drug use: No    Sexual activity: Not Currently     Partners: Male     Birth control/protection: I.U.D.     Review of Systems   Constitutional:  Negative for fatigue and fever.   Respiratory:  Negative for shortness of breath.    Cardiovascular:  Negative for chest pain.   Gastrointestinal:  Positive for abdominal pain, nausea and vomiting.   Neurological:  Negative for syncope.   Objective:     Vital Signs (Most Recent):  Temp: 98.6 °F (37 °C) (12/23/22 1300)  Pulse: 79 (12/23/22 1300)  Resp: (!) 8 (12/23/22 1300)  BP: (!) 152/93 (12/23/22 1300)  SpO2: 97 % (12/23/22 1300)   Vital Signs (24h Range):  Temp:  [96.5 °F (35.8 °C)-99.1 °F (37.3 °C)] 98.6 °F (37 °C)  Pulse:  [79-96] 79  Resp:  [2-22] 8  SpO2:  [84 %-100 %] 97 %  BP: (121-228)/() 152/93     Weight: 59.9 kg (132 lb 0.9 oz)  Body mass index is 24.15 kg/m².    Physical Exam  Vitals reviewed.   HENT:      Mouth/Throat:      Mouth: Mucous membranes are dry.   Cardiovascular:      Rate and Rhythm: Normal rate.      Pulses: Normal pulses.   Pulmonary:      Effort: Pulmonary effort is normal.   Abdominal:      Tenderness: There is abdominal tenderness.   Musculoskeletal:         General: Normal range of motion.   Skin:     General: Skin is warm.   Neurological:      General: No focal deficit present.      Mental Status: She is alert and  oriented to person, place, and time. Mental status is at baseline.           Significant Labs: All pertinent labs within the past 24 hours have been reviewed.  CBC:   Recent Labs   Lab 12/23/22  0350   WBC 6.41   HGB 6.2*   HCT 18.8*   PLT 63*     CMP:   Recent Labs   Lab 12/23/22  0350 12/23/22  0656 12/23/22  1040 12/23/22  1125   *  --   --  133*   K 5.0  --   --  4.3   CL 94*  --   --  97   CO2 23  --   --  28   * 754* 537* 444*   BUN 36*  --   --  39*   CREATININE 5.9*  --   --  5.0*   CALCIUM 8.7  --   --  8.3*   PROT 7.2  --   --   --    ALBUMIN 3.4*  --   --   --    BILITOT 1.7*  --   --   --    ALKPHOS 337*  --   --   --    AST 76*  --   --   --    ALT 97*  --   --   --    ANIONGAP 15  --   --  8     Cardiac Markers: No results for input(s): CKMB, MYOGLOBIN, BNP, TROPISTAT in the last 48 hours.  Magnesium: No results for input(s): MG in the last 48 hours.  Troponin:   Recent Labs   Lab 12/23/22 0350   TROPONINI 0.033*     Urine Studies: No results for input(s): COLORU, APPEARANCEUA, PHUR, SPECGRAV, PROTEINUA, GLUCUA, KETONESU, BILIRUBINUA, OCCULTUA, NITRITE, UROBILINOGEN, LEUKOCYTESUR, RBCUA, WBCUA, BACTERIA, SQUAMEPITHEL, HYALINECASTS in the last 48 hours.    Invalid input(s): HAY    Significant Imaging: I have reviewed all pertinent imaging results/findings within the past 24 hours.        Assessment/Plan:     * Hypertensive urgency  Patient has a current diagnosis of hypertensive urgency (without evidence of end organ damage) which is controlled.  Latest blood pressure and vitals reviewed-   Temp:  [96.5 °F (35.8 °C)-99.1 °F (37.3 °C)]   Pulse:  [79-96]   Resp:  [2-22]   BP: (121-228)/()   SpO2:  [84 %-100 %] .   Patient currently off IV antihypertensives.   Home meds for hypertension were reviewed and noted below.   Hypertension Medications             amLODIPine (NORVASC) 10 MG tablet Take 1 tablet (10 mg total) by mouth once daily.    carvediloL (COREG) 25 MG tablet Take 1  tablet (25 mg total) by mouth 2 (two) times daily.    cloNIDine 0.2 mg/24 hr td ptwk (CATAPRES) 0.2 mg/24 hr Place 1 patch onto the skin every 7 days.    hydrALAZINE (APRESOLINE) 100 MG tablet Take 1 tablet (100 mg total) by mouth every 8 (eight) hours.    isosorbide mononitrate (IMDUR) 60 MG 24 hr tablet Take 2 tablets (120 mg total) by mouth once daily.          Medication adjustment for hospital antihypertensives is as follows- resume home blood pressure medications    Will aim for controlled BP reduction by medications noted above. Monitor and mitigate end organ damage as indicated.    Type 2 diabetes mellitus with chronic kidney disease on chronic dialysis, with long-term current use of insulin  IV insulin GGT at least until sugars a down to the 200s   BMP q.4h, glucose check q.1h  Once stable, resume home medication of Levemir and titrate upwards as tolerated   Patient's FSGs are uncontrolled due to hyperglycemia on current medication regimen.  Last A1c reviewed-   Lab Results   Component Value Date    HGBA1C 6.2 08/24/2022     Most recent fingerstick glucose reviewed-   Recent Labs   Lab 12/23/22  0614   POCTGLUCOSE >500*     Current correctional scale  Medium  Maintain anti-hyperglycemic dose as follows-   Antihyperglycemics (From admission, onward)    Start     Stop Route Frequency Ordered    12/23/22 1415  insulin detemir U-100 pen 9 Units         -- SubQ Daily 12/23/22 1311    12/23/22 1030  insulin regular in 0.9 % NaCl 100 unit/100 mL (1 unit/mL) infusion        Question Answer Comment   Insulin Rate Adjustment (DO NOT MODIFY ANSWER) [email or web address]    Enter initial dose from Infusion Protocol Chart (Units/hr): 1        -- IV Continuous 12/23/22 0926        Hold Oral hypoglycemics while patient is in the hospital.    ESRD (end stage renal disease) on dialysis  consult nephrology for dialysis        VTE Risk Mitigation (From admission, onward)          Ordered     heparin (porcine) injection 4,000 Units  As needed (PRN)         12/23/22 1254     IP VTE HIGH RISK PATIENT  Once         12/23/22 0926     Place sequential compression device  Until discontinued         12/23/22 0926                   Shakeel Humphries MD  Department of Hospital Medicine   [facility]

## 2022-12-23 NOTE — PROVIDER TRANSFER
Outside Transfer Acceptance Note / [facility]erral [facility]    Referring facility: [facility]   Referring provider: SHIRLEY KRAMER  Accepting facility: [facility]  Accepting provider: GALEN GARCIA  Admitting provider: TAMI KING  Reason for transfer:  ICU CAPACITY  Transfer diagnosis: HYPERTENSIVE URGENCY  Transfer specialty requested: Hospital Medicine  Transfer specialty notified: yes  Transfer level: NUMBER 1-5: 2  Bed type requested: ICU  Isolation status: No active isolations   Admission class or status: IP- Inpatient      Narrative     The patient is a 33 y/o female with PMH of ESRD on HD TTS, HTN, seizure disorder, and CHF. She presented with abdominal pain. She last had HD on this past Tuesday but missed her session this past Thursday. Hb 6.2, troponin 0.033, and glucose of 841. Patient has had a history of presenting with abd pain. Given morphine and IV hydralazine. Started on nicardipine drip and also given antiemetics. Patient not in DKA. Given 10 units of insulin. Patient will require ICU admit but no ICU capabilities at the referring site.     Objective     Vitals: Temp: 96.5 °F (35.8 °C) (12/23/22 0252)  Pulse: 94 (12/23/22 0532)  Resp: 20 (12/23/22 0334)  BP: (!) 174/100 (12/23/22 0532)  SpO2: (!) 92 % (12/23/22 0532)  Recent Labs: All pertinent labs within the past 24 hours have been reviewed.  Recent imaging:    Airway:     Vent settings:     IV access:    Infusions:   Allergies:   Review of patient's allergies indicates:   Allergen Reactions    Penicillins Hives      NPO: Yes    Anticoagulation:   Anticoagulants       None             Instructions      [facility]  Admit to Hospital Medicine  Upon patient arrival to floor, please contact Hospital Medicine on call.

## 2022-12-23 NOTE — HPI
The patient is a 31 y/o female with PMH of ESRD on HD TTS, HTN, seizure disorder, and CHF. She presented with abdominal pain. She last had HD on this past Tuesday but missed her session this past Thursday. Hb 6.2, troponin 0.033, and glucose of 841. Patient has had a history of presenting with abd pain. Given morphine and IV hydralazine. Started on nicardipine drip and also given antiemetics. Patient not in DKA. Given 10 units of insulin.  Patient was subsequently transferred from [facility] to [facility].  On presentation to [facility], glucose was on made a will, although patient is not in DKA, will start patient on IV insulin GGT in the meantime.  Blood pressure is now in the 140s.  Discontinue IV nicardipine drip and consult Nephrology for dialysis.

## 2022-12-23 NOTE — ED PROVIDER NOTES
Encounter Date: 2022       History     Chief Complaint   Patient presents with    Abdominal Pain     With vomiting and back pain - started last pm      32-year-old female presents with nausea vomiting abdominal pain.  History CHF, seizure disorder, ESRD (TTS dialysis), hypertension.  Patient reports symptom onset today.  Patient reports generalized abdominal pain associated nausea and vomiting.  Patient reports pain as severe.  Patient states different than prior episodes however unable to state how it is different.  Multiple previous presentations for generalized abdominal pain.  Patient reports she lost her medications 2 days ago.  Patient has been without medications for the last 2 days.  Patient last dialyze Tuesday, 3 days prior.  Per EMR most recent hospitalization 2 weeks prior for similar presentation of generalized abdominal pain.  Imaging revealed pericardial effusion and anasarca of the abdomen.    Review of patient's allergies indicates:   Allergen Reactions    Penicillins Hives     Past Medical History:   Diagnosis Date    CKD (chronic kidney disease), stage IV 2022    Diabetes mellitus due to underlying condition with unspecified complications 2022    Gastroparesis 2022    Heart failure with preserved ejection fraction 2022    EF 55% on 3/22    History of gastroesophageal reflux (GERD)     History of supraventricular tachycardia     Hyperkalemia 2022    Hypertensive emergency 2022    Sickle cell trait 2022    Type 2 diabetes mellitus      Past Surgical History:   Procedure Laterality Date     SECTION      x 3    COLONOSCOPY      COLONOSCOPY N/A 2022    Procedure: COLONOSCOPY;  Surgeon: Jagdeep Cedeno MD;  Location: [facility];  Service: Endoscopy;  Laterality: N/A;    ESOPHAGOGASTRODUODENOSCOPY N/A 10/18/2019    Procedure: ESOPHAGOGASTRODUODENOSCOPY (EGD);  Surgeon: Gianluca Mendez MD;  Location: [facility];  Service: Endoscopy;  Laterality: N/A;     ESOPHAGOGASTRODUODENOSCOPY N/A 08/24/2022    Procedure: EGD (ESOPHAGOGASTRODUODENOSCOPY);  Surgeon: Micky Paredes III, MD;  Location: [facility] ENDO;  Service: Endoscopy;  Laterality: N/A;    ESOPHAGOGASTRODUODENOSCOPY N/A 12/5/2022    Procedure: EGD (ESOPHAGOGASTRODUODENOSCOPY);  Surgeon: Marcelo Zhong MD;  Location: [facility] ENDO;  Service: Endoscopy;  Laterality: N/A;    LAPAROSCOPIC CHOLECYSTECTOMY N/A 07/30/2022    Procedure: CHOLECYSTECTOMY, LAPAROSCOPIC;  Surgeon: Grey Perez MD;  Location: [facility] OR;  Service: General;  Laterality: N/A;    PLACEMENT OF DUAL-LUMEN VASCULAR CATHETER Left 07/12/2022    Procedure: INSERTION-CATHETER-JOSEPH;  Surgeon: Dionte Gan MD;  Location: [facility] OR;  Service: General;  Laterality: Left;    PLACEMENT OF DUAL-LUMEN VASCULAR CATHETER Right 07/26/2022    Procedure: INSERTION-CATHETER-Hemosplit;  Surgeon: Dionte Gan MD;  Location: [facility] OR;  Service: General;  Laterality: Right;     Family History   Problem Relation Age of Onset    Diabetes Mother     Diabetes Father      Social History     Tobacco Use    Smoking status: Never    Smokeless tobacco: Never   Substance Use Topics    Alcohol use: Not Currently    Drug use: No     Review of Systems   Constitutional:  Negative for chills and fever.   HENT:  Negative for congestion and sore throat.    Respiratory:  Negative for cough, chest tightness and shortness of breath.    Cardiovascular:  Negative for chest pain.   Gastrointestinal:  Positive for abdominal pain, nausea and vomiting. Negative for constipation and diarrhea.   Musculoskeletal:  Negative for back pain.   Skin:  Negative for rash.   Neurological:  Negative for weakness and headaches.   Psychiatric/Behavioral:  Negative for confusion.      Physical Exam     Initial Vitals [12/23/22 0252]   BP Pulse Resp Temp SpO2   (!) 228/112 96 (!) 22 96.5 °F (35.8 °C) 98 %      MAP       --         Physical Exam    Nursing note and vitals reviewed.  Constitutional: She  appears well-developed and well-nourished. She is not diaphoretic. She is active. She does not appear ill. She appears distressed.   Tearful.   HENT:   Head: Normocephalic and atraumatic.   Mouth/Throat: Oropharynx is clear and moist.   Eyes: Conjunctivae and EOM are normal.   Neck:   Normal range of motion.  Cardiovascular:  Regular rhythm and normal heart sounds.   Tachycardia present.         No murmur heard.  Right chest wall Vas-Cath in place   Pulmonary/Chest: Breath sounds normal. No respiratory distress. She has no wheezes.   Abdominal: Abdomen is soft and flat. She exhibits no distension. There is generalized abdominal tenderness. No hernia.   Musculoskeletal:         General: No edema.      Cervical back: Normal range of motion.     Neurological: She is alert. GCS score is 15.   Skin: Skin is warm.   Psychiatric: She has a normal mood and affect.       ED Course   Critical Care    Date/Time: 12/23/2022 6:22 AM  Performed by: Nura Reyna MD  Authorized by: Nura Reyna MD   Total critical care time (exclusive of procedural time) : 45 minutes  Critical care time was exclusive of separately billable procedures and treating other patients and teaching time.  Critical care was necessary to treat or prevent imminent or life-threatening deterioration of the following conditions: circulatory failure.  Critical care was time spent personally by me on the following activities: blood draw for specimens, development of treatment plan with patient or surrogate, discussions with consultants, interpretation of cardiac output measurements, evaluation of patient's response to treatment, examination of patient, obtaining history from patient or surrogate, ordering and performing treatments and interventions, ordering and review of laboratory studies, ordering and review of radiographic studies, pulse oximetry, re-evaluation of patient's condition and review of old charts.  Comments: Requiring multiple doses of IV  and titratable antihypertensives.  Blood transfusion for symptomatic anemia.      Labs Reviewed   CBC W/ AUTO DIFFERENTIAL - Abnormal; Notable for the following components:       Result Value    RBC 2.23 (*)     Hemoglobin 6.2 (*)     Hematocrit 18.8 (*)     RDW 14.9 (*)     Platelets 63 (*)     Immature Granulocytes 0.9 (*)     Immature Grans (Abs) 0.06 (*)     Lymph # 0.3 (*)     Mono # 0.2 (*)     Gran % 90.5 (*)     Lymph % 4.7 (*)     Mono % 3.1 (*)     All other components within normal limits    Narrative:     Release to patient->Immediate  Hematocrit  critical result(s) called and verbal readback obtained   from Maty Freire RN.  by LEATHA 12/23/2022 04:13  Collection has been rescheduled by MR at 12/23/2022 03:23 Reason:   Patient unavailable wanted ti see her nurse before being stuck    COMPREHENSIVE METABOLIC PANEL - Abnormal; Notable for the following components:    Sodium 132 (*)     Chloride 94 (*)     Glucose 841 (*)     BUN 36 (*)     Creatinine 5.9 (*)     Albumin 3.4 (*)     Total Bilirubin 1.7 (*)     Alkaline Phosphatase 337 (*)     AST 76 (*)     ALT 97 (*)     eGFR 9 (*)     All other components within normal limits    Narrative:     Release to patient->Immediate  Glucose    critical result(s) called and verbal readback obtained   from Maty Freire RN.  by ARACELI 12/23/2022 04:43  Collection has been rescheduled by MR at 12/23/2022 03:23 Reason:   Patient unavailable wanted ti see her nurse before being stuck    TROPONIN I - Abnormal; Notable for the following components:    Troponin I 0.033 (*)     All other components within normal limits    Narrative:     Release to patient->Immediate  Collection has been rescheduled by MR at 12/23/2022 03:23 Reason:   Patient unavailable wanted ti see her nurse before being stuck    POCT GLUCOSE - Abnormal; Notable for the following components:    POCT Glucose >500 (*)     All other components within normal limits   LIPASE    Narrative:     Release  to patient->Immediate  Collection has been rescheduled by MRQ1 at 12/23/2022 03:23 Reason:   Patient unavailable wanted ti see her nurse before being stuck    HCG, QUANTITATIVE    Narrative:     Release to patient->Immediate  Collection has been rescheduled by MRQ1 at 12/23/2022 03:23 Reason:   Patient unavailable wanted ti see her nurse before being stuck    LACTIC ACID, PLASMA    Narrative:     Release to patient->Immediate  Collection has been rescheduled by MRQ1 at 12/23/2022 03:23 Reason:   Patient unavailable wanted ti see her nurse before being stuck    ACETAMINOPHEN LEVEL   GLUCOSE, RANDOM   TYPE & SCREEN   POCT GLUCOSE MONITORING CONTINUOUS   PREPARE RBC SOFT          Imaging Results              CT Abdomen Pelvis  Without Contrast (In process)                      X-Ray Chest 1 View (In process)                      Medications   niCARdipine 40 mg/200 mL (0.2 mg/mL) infusion (5 mg/hr Intravenous New Bag 12/23/22 0520)   morphine injection 4 mg (has no administration in time range)   0.9%  NaCl infusion (for blood administration) (has no administration in time range)   hydrALAZINE injection 10 mg (10 mg Intravenous Given 12/23/22 0334)   morphine injection 4 mg (4 mg Intravenous Given 12/23/22 0334)   prochlorperazine injection Soln 10 mg (10 mg Intravenous Given 12/23/22 0334)   insulin regular injection 10 Units 0.1 mL (10 Units Intravenous Given 12/23/22 0504)   ondansetron injection 4 mg (4 mg Intravenous Given 12/23/22 0517)     Medical Decision Making:   History:   Old Medical Records: I decided to obtain old medical records.  Initial Assessment:   32-year-old female presenting with generalized abdominal pain.  Multiple similar visits in the past.  On initial evaluation patient had generalized abdominal pain.  Most recent CT abdomen and pelvis 12/9 reviewed.  CT abdomen and pelvis again obtained due to transaminitis today.  Patient given IV antihypertensive with difficult to control blood pressure.   Patient is started on a Cardene drip.  Suspect patient would likely benefit from urgent dialysis due to resistant hypertension.  1 unit PRBC ordered for symptomatic anemia.  Patient transferred for higher level of care for critical care service.  Differential Diagnosis:   Ischemic bowel versus gastroparesis versus DKA versus bowel perforation versus hypertensive urgency/emergency  Clinical Tests:   Lab Tests: Ordered and Reviewed  Radiological Study: Reviewed and Ordered  Medical Tests: Ordered and Reviewed           ED Course as of 12/23/22 0627   Fri Dec 23, 2022   0351 EKG 12-lead  Time 3:43 a.m.     Rate 92, sinus, regular rhythm, left axis deviation.   QRS 88 .  No ST elevation or depression no T-wave inversion no Q-waves present     Normal sinus rhythm with left axis deviation.   [JM]   0444 Glucose(!!): 841  Does not appear to be in DKA. [JM]   0444 CO2: 23 [JM]   0444 ANION GAP: 15 [JM]   0444 Potassium: 5.0 [JM]   0534 Case discussed with Dr. Yousfu Connolly [facility].      discussed case of hypertensive urgency with difficulty controlling blood pressure.  Patient is started on Cardene drip.  Discussed hyperglycemia without signs of DKA.  Discussed hemoglobin of 6.2 and will likely need transfusion to be given during dialysis.  Discussed with patient would likely benefit from urgent dialysis due to resistant hypertension.  No hypoxia during ER visit. [JM]   0624 While waiting for transfer 1 unit PRBC has been ordered. [JM]      ED Course User Index  [JM] Nura Reyna MD                   Clinical Impression:   Final diagnoses:  [R10.84] Generalized abdominal pain  [R73.9] Hyperglycemia (Primary)  [R11.2] Nausea and vomiting, unspecified vomiting type  [D64.9] Symptomatic anemia  [I16.0] Hypertensive urgency  [R74.01] Transaminitis        ED Disposition Condition    Transfer to Another Facility Stable                Nura Reyna MD  12/23/22 0689

## 2022-12-23 NOTE — CONSULTS
INPATIENT NEPHROLOGY CONSULT   [facility]    Patient Name: Tabby Howard  Date: 12/23/2022    Reason for consultation: ESRD    Chief Complaint: N/V    History of Present Illness:  32-year-old female presents with nausea vomiting abdominal pain.  History CHF, seizure disorder, ESRD (TTS dialysis), hypertension, DMI and gastroparesis.  Patient reports symptom onset today.  Patient reports generalized abdominal pain associated nausea and vomiting.  Patient reports pain as severe.  Patient states different than prior episodes however unable to state how it is different.  Multiple previous presentations for generalized abdominal pain.  Patient reports she lost her medications 2 days ago. Patient has been without medications for the last 2 days.  Patient last dialyzed Tuesday, 3 days prior.  Per EMR most recent hospitalization 2 weeks prior for similar presentation of generalized abdominal pain at [facility].  Imaging revealed pericardial effusion and anasarca of the abdomen.Transferred to [facility] for ICU care. Consulted for dialysis.    CT AP:  Moderate anasarca.  Mild pulmonary edema.  New small right pleural effusion.  Unchanged moderate pericardial effusion.  New small volume ascites.    Interval History:  12/23- on insulin gtt, getting blood transfusion, was on cardene gtt but SBP 110s so told ICU nurse to stop, dry MM, diffuse abd pain    Plan of Care:    Assessment:  ESRD on HD TTS  HTN/D CHF/Pericardial effusion  Hyponatremia  DMI, uncontrolled/Gastroparesis  SHPT  Anemia of CKD    Plan:    - ordered HD today and then again on TTS schedule  - stop cardene gtt- slowly reintroduce oral meds- can hold diuretics for now- imaging noted- try 1-2L UF today and then 2-4L tomorrow  - renal diet, 1.5L fluid restriction  - on insulin gtt, control pain as able  - usual phos range is normal  - getting blood transfusion now- ordered [facility] with HD TTS    Thank you for allowing us to participate in this patient's care. We  will continue to follow.    Vital Signs:  Temp Readings from Last 3 Encounters:   22 99.1 °F (37.3 °C)   22 98.4 °F (36.9 °C)   22 98 °F (36.7 °C)       Pulse Readings from Last 3 Encounters:   22 89   22 89   22 91       BP Readings from Last 3 Encounters:   22 (!) 147/93   22 (!) 162/84   22 (!) 169/99       Weight:  Wt Readings from Last 3 Encounters:   22 59.9 kg (132 lb 0.9 oz)   22 59.9 kg (132 lb)   22 59.9 kg (132 lb)       Past Medical & Surgical History:  Past Medical History:   Diagnosis Date    CKD (chronic kidney disease), stage IV 2022    Diabetes mellitus due to underlying condition with unspecified complications 2022    Gastroparesis 2022    Heart failure with preserved ejection fraction 2022    EF 55% on 3/22    History of gastroesophageal reflux (GERD)     History of supraventricular tachycardia     Hyperkalemia 2022    Hypertensive emergency 2022    Sickle cell trait 2022    Type 2 diabetes mellitus        Past Surgical History:   Procedure Laterality Date     SECTION      x 3    COLONOSCOPY      COLONOSCOPY N/A 2022    Procedure: COLONOSCOPY;  Surgeon: Jagdeep Cedeno MD;  Location: [facility];  Service: Endoscopy;  Laterality: N/A;    ESOPHAGOGASTRODUODENOSCOPY N/A 10/18/2019    Procedure: ESOPHAGOGASTRODUODENOSCOPY (EGD);  Surgeon: Gianluca Mendez MD;  Location: [facility];  Service: Endoscopy;  Laterality: N/A;    ESOPHAGOGASTRODUODENOSCOPY N/A 2022    Procedure: EGD (ESOPHAGOGASTRODUODENOSCOPY);  Surgeon: Micky Paredes III, MD;  Location: [facility];  Service: Endoscopy;  Laterality: N/A;    ESOPHAGOGASTRODUODENOSCOPY N/A 2022    Procedure: EGD (ESOPHAGOGASTRODUODENOSCOPY);  Surgeon: Marcelo Zhong MD;  Location: [facility];  Service: Endoscopy;  Laterality: N/A;    LAPAROSCOPIC CHOLECYSTECTOMY N/A 2022    Procedure: CHOLECYSTECTOMY, LAPAROSCOPIC;   Surgeon: Grey Perez MD;  Location: [facility] OR;  Service: General;  Laterality: N/A;    PLACEMENT OF DUAL-LUMEN VASCULAR CATHETER Left 07/12/2022    Procedure: INSERTION-CATHETER-JOSEPH;  Surgeon: Dionte Gan MD;  Location: [facility] OR;  Service: General;  Laterality: Left;    PLACEMENT OF DUAL-LUMEN VASCULAR CATHETER Right 07/26/2022    Procedure: INSERTION-CATHETER-Hemosplit;  Surgeon: Dionte Gan MD;  Location: [facility] OR;  Service: General;  Laterality: Right;       Past Social History:  Social History     Socioeconomic History    Marital status:    Tobacco Use    Smoking status: Never    Smokeless tobacco: Never   Substance and Sexual Activity    Alcohol use: Not Currently    Drug use: No    Sexual activity: Not Currently     Partners: Male     Birth control/protection: I.U.D.     Social Determinants of Health     Financial Resource Strain: Unknown    Difficulty of Paying Living Expenses: Patient refused   Food Insecurity: Unknown    Worried About Running Out of Food in the Last Year: Patient refused    Ran Out of Food in the Last Year: Patient refused   Transportation Needs: Unmet Transportation Needs    Lack of Transportation (Medical): Yes    Lack of Transportation (Non-Medical): Yes   Physical Activity: Inactive    Days of Exercise per Week: 0 days    Minutes of Exercise per Session: 0 min   Stress: Unknown    Feeling of Stress : Patient refused   Social Connections: Unknown    Frequency of Communication with Friends and Family: More than three times a week    Frequency of Social Gatherings with Friends and Family: More than three times a week    Attends Tenriism Services: Patient refused    Active Member of Clubs or Organizations: Patient refused    Attends Club or Organization Meetings: Patient refused    Marital Status: Patient refused   Housing Stability: High Risk    Unable to Pay for Housing in the Last Year: Patient refused    Unstable Housing in the Last Year: Yes        Medications:  Scheduled Meds:   amLODIPine  10 mg Oral Daily    carvediloL  25 mg Oral BID    cloNIDine 0.2 mg/24 hr td ptwk  1 patch Transdermal Q7 Days    dicyclomine  10 mg Oral TID    FLUoxetine  20 mg Oral Daily    gabapentin  100 mg Oral BID    hydrALAZINE  100 mg Oral Q8H    isosorbide mononitrate  120 mg Oral Daily    levETIRAcetam  500 mg Oral BID    mirtazapine  15 mg Oral Nightly    pantoprazole  40 mg Oral Daily    sodium chloride 0.9%  1,000 mL Intravenous Once     Continuous Infusions:   insulin regular 1 units/mL infusion orderable (DKA)       PRN Meds:.calcium gluconate IVPB, calcium gluconate IVPB, calcium gluconate IVPB, dextrose 10 % in water (D10W), dextrose 10 % in water (D10W), dextrose 10%, dextrose 10%, magnesium sulfate IVPB, magnesium sulfate IVPB, morphine, potassium chloride **AND** potassium chloride **AND** potassium chloride, sodium chloride 0.9%, sodium phosphate IVPB, sodium phosphate IVPB, sodium phosphate IVPB  Current Facility-Administered Medications on File Prior to Encounter   Medication Dose Route Frequency Provider Last Rate Last Admin    [COMPLETED] hydrALAZINE injection 10 mg  10 mg Intravenous Once Nura Reyna MD   10 mg at 12/23/22 0334    [COMPLETED] insulin regular injection 10 Units 0.1 mL  10 Units Intravenous ED 1 Time Nura Reyna MD   10 Units at 12/23/22 0504    [COMPLETED] morphine injection 4 mg  4 mg Intravenous ED 1 Time Nura Reyna MD   4 mg at 12/23/22 0334    [COMPLETED] ondansetron injection 4 mg  4 mg Intravenous ED 1 Time Nura Reyna MD   4 mg at 12/23/22 0517    [COMPLETED] prochlorperazine injection Soln 10 mg  10 mg Intravenous ED 1 Time Nura Reyna MD   10 mg at 12/23/22 0334    [DISCONTINUED] 0.9%  NaCl infusion (for blood administration)   Intravenous Q24H PRN Nura Reyna MD        [DISCONTINUED] morphine injection 4 mg  4 mg Intravenous ED 1 Time Nura Reyna MD        [DISCONTINUED] niCARdipine 40 mg/200 mL  (0.2 mg/mL) infusion  5 mg/hr Intravenous Continuous Nura Reyna MD 25 mL/hr at 12/23/22 0520 5 mg/hr at 12/23/22 0520     Current Outpatient Medications on File Prior to Encounter   Medication Sig Dispense Refill    albuterol (PROVENTIL/VENTOLIN HFA) 90 mcg/actuation inhaler Inhale 2 puffs into the lungs every 6 (six) hours as needed for Wheezing. Rescue 18 g 3    amLODIPine (NORVASC) 10 MG tablet Take 1 tablet (10 mg total) by mouth once daily. 30 tablet 11    apixaban (ELIQUIS) 5 mg Tab Take 1 tablet (5 mg total) by mouth 2 (two) times daily. 60 tablet 2    carvediloL (COREG) 25 MG tablet Take 1 tablet (25 mg total) by mouth 2 (two) times daily. 60 tablet 2    cloNIDine 0.2 mg/24 hr td ptwk (CATAPRES) 0.2 mg/24 hr Place 1 patch onto the skin every 7 days. 4 patch 2    dicyclomine (BENTYL) 10 MG capsule Take 1 capsule (10 mg total) by mouth 3 (three) times daily. 90 capsule 0    FLUoxetine 20 MG capsule Take 1 capsule (20 mg total) by mouth once daily. 30 capsule 11    gabapentin (NEURONTIN) 100 MG capsule Take 1 capsule (100 mg total) by mouth 2 (two) times daily. 90 capsule 2    hydrALAZINE (APRESOLINE) 100 MG tablet Take 1 tablet (100 mg total) by mouth every 8 (eight) hours. 90 tablet 2    insulin aspart U-100 (NOVOLOG) 100 unit/mL (3 mL) InPn pen Inject 1-10 Units into the skin before meals and at bedtime as needed (Hyperglycemia). Max daily dose 40 units. 3 mL 6    insulin detemir U-100 (LEVEMIR FLEXTOUCH) 100 unit/mL (3 mL) SubQ InPn pen Inject 9 Units into the skin once daily. 6 mL 2    isosorbide mononitrate (IMDUR) 60 MG 24 hr tablet Take 2 tablets (120 mg total) by mouth once daily. 30 tablet 11    levETIRAcetam (KEPPRA) 500 MG Tab Take 1 tablet (500 mg total) by mouth 2 (two) times daily. 60 tablet 11    mirtazapine (REMERON SOL-TAB) 15 MG disintegrating tablet Take 1 tablet (15 mg total) by mouth nightly. 90 tablet 0    ondansetron (ZOFRAN) 4 MG tablet Take 1 tablet (4 mg total) by mouth every 8  "(eight) hours as needed for Nausea. 60 tablet 2    pantoprazole (PROTONIX) 40 MG tablet Take 40 mg by mouth once daily.      pen needle, diabetic (BD ULTRA-FINE MAGALIE PEN NEEDLE) 32 gauge x 5/32" Ndle Inject into the skin 5 times per day with insulin 100 each 12    promethazine (PHENERGAN) 25 MG tablet Take 1 tablet (25 mg total) by mouth every 12 (twelve) hours as needed (nausea/vomiting not relieved with ondansetron (zofran)). 60 tablet 0    [DISCONTINUED] atenoloL (TENORMIN) 50 MG tablet Take 1 tablet (50 mg total) by mouth every other day. 45 tablet 3    [DISCONTINUED] omeprazole (PRILOSEC) 20 MG capsule Take 2 capsules (40 mg total) by mouth once daily. for 10 days 20 capsule 0    [DISCONTINUED] torsemide (DEMADEX) 20 MG Tab Take 1 tablet (20 mg total) by mouth 2 (two) times a day. (Patient taking differently: Take 20 mg by mouth once daily.) 60 tablet 1       Allergies:  Penicillins    Past Family History:  Reviewed; refer to Hospitalist Admission Note    Review of Systems:  Review of Systems - All 14 systems reviewed and negative, except as noted in HPI    Physical Exam:  General Appearance:    NAD, AAO x 3, cooperative, appears stated age   Head:    Normocephalic, atraumatic   Eyes:    PER, EOMI, and conjunctiva/sclera clear bilaterally       Mouth:   Dry MM   Back:     No CVA tenderness   Lungs:     Coarse   Chest wall:    No tenderness or deformity   Heart:    Regular rate and rhythm, S1 and S2 normal, no murmur, rub   or gallop   Abdomen:     Soft, tender, non-distended, bowel sounds active all four   quadrants, no RT or guarding, no masses, no organomegaly   Extremities:   Warm and well perfused, distal pulses are intact, no             cyanosis or peripheral edema   MSK:   No joint or muscle swelling, tenderness or deformity   Skin:   Skin color, texture, turgor normal, no rashes or lesions   Neurologic/Psychiatric:   CNII-XII intact, normal strength and sensation       throughout, no asterixis; normal " affect, memory, judgement     and insight      Results:  Lab Results   Component Value Date     (L) 12/23/2022    K 5.0 12/23/2022    CL 94 (L) 12/23/2022    CO2 23 12/23/2022    BUN 36 (H) 12/23/2022    CREATININE 5.9 (H) 12/23/2022    CALCIUM 8.7 12/23/2022    ANIONGAP 15 12/23/2022    ESTGFRAFRICA 20 (A) 07/31/2022    EGFRNONAA 18 (A) 07/31/2022       Lab Results   Component Value Date    CALCIUM 8.7 12/23/2022    PHOS 3.2 12/14/2022       Recent Labs   Lab 12/23/22  0350   WBC 6.41   RBC 2.23*   HGB 6.2*   HCT 18.8*   PLT 63*   MCV 84   MCH 27.8   MCHC 33.0       I have personally reviewed pertinent radiological imaging and reports.    I have spent > 70 minutes providing care for this patient for the above diagnoses. These services have included chart/data/imaging review, evaluation, exam, formulation of plan, , note preparation, and discussions with staff involved in this patient's care.    Prateek Clark MD MPH  [facility] [address] [address]  [phone] (p)  [phone] (f)

## 2022-12-23 NOTE — PLAN OF CARE
[facility]  Initial Discharge Assessment       Primary Care Provider: [facility]    Admission Diagnosis: Hypertensive urgency [I16.0]    Admission Date: 12/23/2022    DC assessment completed with patient at bedside and via chart review due to patient being very drowsy while trying to answer questions.  Information verified as correct on facesheet.  Patient states her address is correct but is staying with her dad in [place] for a little while.  Patient denies HPOA.  Denies HH/Coumadin.  Patient states dialysis chair time at [facility] on [address].  Uses walker at home.  Patient independent in ADL's.  Patient states her daughter lost all of her home meds.  DC plan is home. Father to provide transport on discharge.             Payor: MEDICAID / Plan: HEALTHY BLUE ([place]) / Product Type: Managed Medicaid /     Extended Emergency Contact Information  Primary Emergency Contact: Garcia Howard  Mobile Phone: [phone]  Relation: Father  Preferred language: English   needed? No  Secondary Emergency Contact: Tanya Howard  Mobile Phone: [phone]  Relation: Step parent  Preferred language: English   needed? No    Discharge Plan A: (P) Home with family  Discharge Plan B: (P) Home with family      [facility] [address] [place] [address] - [address]  [address] [facility] [address]  Phone: [phone] Fax: [phone]    [id number] [address] AT [facility]  [address]  [facility] [address]  Phone: [phone] Fax: [phone]      Initial Assessment (most recent)       Adult Discharge Assessment - 12/23/22 1142          Discharge Assessment    Assessment Type Discharge Planning Assessment (P)      Confirmed/corrected address, phone number and insurance Yes (P)      Confirmed Demographics Correct on Facesheet (P)      Source of Information patient  (P)      Reason For Admission hypertensive urgency (P)      People in Home parent(s) (P)      Facility Arrived From: Dad's house (P)      Do you expect to return to your current living situation? Yes (P)      Do you have help at home or someone to help you manage your care at home? Yes (P)      Equipment Currently Used at Home walker rolling (P)      Readmission within 30 days? Yes (P)      Patient currently being followed by outpatient case management? No (P)      Do you currently have service(s) that help you manage your care at home? No (P)      Do you take prescription medications? Yes (P)      Do you have prescription coverage? Yes (P)      Coverage medicaid (P)      Do you have any problems affording any of your prescribed medications? No (P)      Is the patient taking medications as prescribed? no (P)      If no, which medications is patient not taking? Patient stated she needs all of her meds refilled because her daughter lost them all. (P)      Who is going to help you get home at discharge? dad (P)      How do you get to doctors appointments? family or friend will provide (P)      Are you on dialysis? Yes (P)      Dialysis Name and Scheduled days [facility] on [address], T, Th, Sat (P)      Do you take coumadin? No (P)      Discharge Plan A Home with family (P)      Discharge Plan B Home with family (P)      DME Needed Upon Discharge  none (P)      Discharge Plan discussed with: Patient (P)

## 2022-12-23 NOTE — ED NOTES
Report given to SHAILA Abbasi at [facility] ICU. Pt to be transferred to room 2526 via routine ALS EMS.   
present

## 2022-12-24 VITALS
HEIGHT: 62 IN | SYSTOLIC BLOOD PRESSURE: 100 MMHG | TEMPERATURE: 99 F | BODY MASS INDEX: 24.3 KG/M2 | OXYGEN SATURATION: 93 % | HEART RATE: 70 BPM | RESPIRATION RATE: 18 BRPM | DIASTOLIC BLOOD PRESSURE: 70 MMHG | WEIGHT: 132.06 LBS

## 2022-12-24 LAB
ANION GAP SERPL CALC-SCNC: 6 MMOL/L (ref 8–16)
BUN SERPL-MCNC: 23 MG/DL (ref 6–20)
CALCIUM SERPL-MCNC: 8.5 MG/DL (ref 8.7–10.5)
CHLORIDE SERPL-SCNC: 101 MMOL/L (ref 95–110)
CO2 SERPL-SCNC: 29 MMOL/L (ref 23–29)
CREAT SERPL-MCNC: 3.5 MG/DL (ref 0.5–1.4)
ERYTHROCYTE [DISTWIDTH] IN BLOOD BY AUTOMATED COUNT: 15.9 % (ref 11.5–14.5)
EST. GFR  (NO RACE VARIABLE): 17 ML/MIN/1.73 M^2
GLUCOSE SERPL-MCNC: 122 MG/DL (ref 70–110)
GLUCOSE SERPL-MCNC: 161 MG/DL (ref 70–110)
GLUCOSE SERPL-MCNC: 195 MG/DL (ref 70–110)
HCT VFR BLD AUTO: 24 % (ref 37–48.5)
HGB BLD-MCNC: 8.1 G/DL (ref 12–16)
MAGNESIUM SERPL-MCNC: 1.7 MG/DL (ref 1.6–2.6)
MCH RBC QN AUTO: 27.6 PG (ref 27–31)
MCHC RBC AUTO-ENTMCNC: 33.8 G/DL (ref 32–36)
MCV RBC AUTO: 82 FL (ref 82–98)
PLATELET # BLD AUTO: 94 K/UL (ref 150–450)
PMV BLD AUTO: 9.5 FL (ref 9.2–12.9)
POTASSIUM SERPL-SCNC: 4 MMOL/L (ref 3.5–5.1)
RBC # BLD AUTO: 2.93 M/UL (ref 4–5.4)
SODIUM SERPL-SCNC: 136 MMOL/L (ref 136–145)
WBC # BLD AUTO: 5.77 K/UL (ref 3.9–12.7)

## 2022-12-24 PROCEDURE — 63600175 PHARM REV CODE 636 W HCPCS: Mod: JG | Performed by: INTERNAL MEDICINE

## 2022-12-24 PROCEDURE — 25000003 PHARM REV CODE 250: Performed by: STUDENT IN AN ORGANIZED HEALTH CARE EDUCATION/TRAINING PROGRAM

## 2022-12-24 PROCEDURE — 83735 ASSAY OF MAGNESIUM: CPT | Performed by: STUDENT IN AN ORGANIZED HEALTH CARE EDUCATION/TRAINING PROGRAM

## 2022-12-24 PROCEDURE — 90935 HEMODIALYSIS ONE EVALUATION: CPT

## 2022-12-24 PROCEDURE — 96367 TX/PROPH/DG ADDL SEQ IV INF: CPT | Mod: 59

## 2022-12-24 PROCEDURE — 96375 TX/PRO/DX INJ NEW DRUG ADDON: CPT | Mod: 59

## 2022-12-24 PROCEDURE — 80048 BASIC METABOLIC PNL TOTAL CA: CPT | Performed by: STUDENT IN AN ORGANIZED HEALTH CARE EDUCATION/TRAINING PROGRAM

## 2022-12-24 PROCEDURE — G0378 HOSPITAL OBSERVATION PER HR: HCPCS

## 2022-12-24 PROCEDURE — 96376 TX/PRO/DX INJ SAME DRUG ADON: CPT | Mod: 59

## 2022-12-24 PROCEDURE — 63600175 PHARM REV CODE 636 W HCPCS: Performed by: STUDENT IN AN ORGANIZED HEALTH CARE EDUCATION/TRAINING PROGRAM

## 2022-12-24 PROCEDURE — 63600175 PHARM REV CODE 636 W HCPCS: Performed by: INTERNAL MEDICINE

## 2022-12-24 PROCEDURE — 85027 COMPLETE CBC AUTOMATED: CPT | Performed by: STUDENT IN AN ORGANIZED HEALTH CARE EDUCATION/TRAINING PROGRAM

## 2022-12-24 PROCEDURE — 96366 THER/PROPH/DIAG IV INF ADDON: CPT

## 2022-12-24 RX ORDER — MAGNESIUM SULFATE HEPTAHYDRATE 40 MG/ML
2 INJECTION, SOLUTION INTRAVENOUS ONCE
Status: COMPLETED | OUTPATIENT
Start: 2022-12-24 | End: 2022-12-24

## 2022-12-24 RX ORDER — ONDANSETRON 2 MG/ML
4 INJECTION INTRAMUSCULAR; INTRAVENOUS EVERY 6 HOURS PRN
Status: DISCONTINUED | OUTPATIENT
Start: 2022-12-24 | End: 2022-12-24 | Stop reason: HOSPADM

## 2022-12-24 RX ADMIN — GABAPENTIN 100 MG: 100 CAPSULE ORAL at 08:12

## 2022-12-24 RX ADMIN — MAGNESIUM SULFATE HEPTAHYDRATE 2 G: 40 INJECTION, SOLUTION INTRAVENOUS at 01:12

## 2022-12-24 RX ADMIN — HEPARIN SODIUM 4000 UNITS: 1000 INJECTION, SOLUTION INTRAVENOUS; SUBCUTANEOUS at 12:12

## 2022-12-24 RX ADMIN — FLUOXETINE 20 MG: 20 CAPSULE ORAL at 08:12

## 2022-12-24 RX ADMIN — DICYCLOMINE HYDROCHLORIDE 10 MG: 10 CAPSULE ORAL at 03:12

## 2022-12-24 RX ADMIN — PANTOPRAZOLE SODIUM 40 MG: 40 TABLET, DELAYED RELEASE ORAL at 05:12

## 2022-12-24 RX ADMIN — CARVEDILOL 25 MG: 25 TABLET, FILM COATED ORAL at 08:12

## 2022-12-24 RX ADMIN — HYDRALAZINE HYDROCHLORIDE 100 MG: 25 TABLET ORAL at 05:12

## 2022-12-24 RX ADMIN — MORPHINE SULFATE 2 MG: 2 INJECTION, SOLUTION INTRAMUSCULAR; INTRAVENOUS at 03:12

## 2022-12-24 RX ADMIN — INSULIN ASPART 2 UNITS: 100 INJECTION, SOLUTION INTRAVENOUS; SUBCUTANEOUS at 07:12

## 2022-12-24 RX ADMIN — ONDANSETRON 4 MG: 2 INJECTION INTRAMUSCULAR; INTRAVENOUS at 02:12

## 2022-12-24 RX ADMIN — EPOETIN ALFA-EPBX 9000 UNITS: 10000 INJECTION, SOLUTION INTRAVENOUS; SUBCUTANEOUS at 12:12

## 2022-12-24 RX ADMIN — MORPHINE SULFATE 2 MG: 2 INJECTION, SOLUTION INTRAMUSCULAR; INTRAVENOUS at 07:12

## 2022-12-24 RX ADMIN — MORPHINE SULFATE 2 MG: 2 INJECTION, SOLUTION INTRAMUSCULAR; INTRAVENOUS at 01:12

## 2022-12-24 RX ADMIN — DICYCLOMINE HYDROCHLORIDE 10 MG: 10 CAPSULE ORAL at 08:12

## 2022-12-24 RX ADMIN — AMLODIPINE BESYLATE 10 MG: 5 TABLET ORAL at 08:12

## 2022-12-24 RX ADMIN — LEVETIRACETAM 500 MG: 500 TABLET, FILM COATED ORAL at 08:12

## 2022-12-24 RX ADMIN — INSULIN ASPART 2 UNITS: 100 INJECTION, SOLUTION INTRAVENOUS; SUBCUTANEOUS at 06:12

## 2022-12-24 RX ADMIN — HYDRALAZINE HYDROCHLORIDE 100 MG: 25 TABLET ORAL at 03:12

## 2022-12-24 RX ADMIN — ISOSORBIDE MONONITRATE 120 MG: 60 TABLET, EXTENDED RELEASE ORAL at 08:12

## 2022-12-24 RX ADMIN — INSULIN DETEMIR 9 UNITS: 100 INJECTION, SOLUTION SUBCUTANEOUS at 08:12

## 2022-12-24 NOTE — PLAN OF CARE
Patient cleared for discharge from case management standpoint.    Chart and discharge orders reviewed.  Patient discharged home with no further case management needs.       12/24/22 1555   Final Note   Assessment Type Final Discharge Note   Anticipated Discharge Disposition Home   What phone number can be called within the next 1-3 days to see how you are doing after discharge? [phone]   Post-Acute Status   Discharge Delays None known at this time

## 2022-12-24 NOTE — NURSING
Pt returned from dialysis per nurse removed 3L. Pt V/S /91 O2 96% on 2L n/c HR 80 and RR 18 no reports of distress noted. Eating lunch

## 2022-12-24 NOTE — HOSPITAL COURSE
31 y/o female with PMH of ESRD on HD TTS, HTN, seizure disorder, and CHF. She presented with abdominal pain. She last had HD on this past Tuesday but missed her session this past Thursday.   Hb 6.2 and transfused blood and patient got HD and did well and DC in stable condition .  As usual , she complained of abdominal pain in ER and CTA abdomen was done and no significant change.  Her blood pressures also got controlled and also blood sugar controlled.repeat Hb stable too.

## 2022-12-24 NOTE — PLAN OF CARE
Problem: Adult Inpatient Plan of Care  Goal: Plan of Care Review  Outcome: Met  Goal: Patient-Specific Goal (Individualized)  Outcome: Met  Goal: Absence of Hospital-Acquired Illness or Injury  Outcome: Met  Goal: Optimal Comfort and Wellbeing  Outcome: Met  Goal: Readiness for Transition of Care  Outcome: Met     Problem: Diabetes Comorbidity  Goal: Blood Glucose Level Within Targeted Range  Outcome: Met     Problem: Infection  Goal: Absence of Infection Signs and Symptoms  Outcome: Met     Problem: Device-Related Complication Risk (Hemodialysis)  Goal: Safe, Effective Therapy Delivery  Outcome: Met     Problem: Hemodynamic Instability (Hemodialysis)  Goal: Effective Tissue Perfusion  Outcome: Met     Problem: Infection (Hemodialysis)  Goal: Absence of Infection Signs and Symptoms  Outcome: Met     Problem: Fall Injury Risk  Goal: Absence of Fall and Fall-Related Injury  Outcome: Met   Adequate for discharge

## 2022-12-24 NOTE — NURSING
Pt resting in bed, report received from Ilana LINTON and care assumed. Pt assessed and no distress noted at this time. Will continue to monitor pt.

## 2022-12-24 NOTE — DISCHARGE SUMMARY
formerly [facility] Medicine  Discharge Summary      Patient Name: Tabby Howard  MRN: [id number]  UNIQUE: [id number]  Patient Class: IP- Inpatient  Admission Date: 12/23/2022  Hospital Length of Stay: 1 days  Discharge Date and Time:  12/24/2022 3:37 PM  Attending Physician: Armani Gurrola MD   Discharging Provider: Armani Gurrola MD  Primary Care Provider: [facility]    Primary Care Team: Networked reference to record PCT     HPI:   The patient is a 31 y/o female with PMH of ESRD on HD TTS, HTN, seizure disorder, and CHF. She presented with abdominal pain. She last had HD on this past Tuesday but missed her session this past Thursday. Hb 6.2, troponin 0.033, and glucose of 841. Patient has had a history of presenting with abd pain. Given morphine and IV hydralazine. Started on nicardipine drip and also given antiemetics. Patient not in DKA. Given 10 units of insulin.  Patient was subsequently transferred from [facility] to [facility].  On presentation to [facility], glucose was on made a will, although patient is not in DKA, will start patient on IV insulin GGT in the meantime.  Blood pressure is now in the 140s.  Discontinue IV nicardipine drip and consult Nephrology for dialysis.      * No surgery found *      Hospital Course:   31 y/o female with PMH of ESRD on HD TTS, HTN, seizure disorder, and CHF. She presented with abdominal pain. She last had HD on this past Tuesday but missed her session this past Thursday.   Hb 6.2 and transfused blood and patient got HD and did well and DC in stable condition .  As usual , she complained of abdominal pain in ER and CTA abdomen was done and no significant change.  Her blood pressures also got controlled and also blood sugar controlled.repeat Hb stable too.       Goals of Care Treatment Preferences:  Code Status: Full Code    Health care agent: Step-Mother Tanya Howard / Father  [facility] agent number: [phone] / [phone]          What is most important right now is to focus on remaining as independent as possible.  Accordingly, we have decided that the best plan to meet the patient's goals includes continuing with treatment.      Consults:   Consults (From admission, onward)        Status Ordering Provider     Inpatient consult to Midline team  Once        Provider:  (Not yet assigned)    Acknowledged KADE SR     Inpatient consult to Nephrology  Once        Provider:  Prateek Clark MD    Completed KADE SR          No new Assessment & Plan notes have been filed under this hospital service since the last note was generated.  Service: Hospital Medicine    Final Active Diagnoses:    Diagnosis Date Noted POA    PRINCIPAL PROBLEM:  Hypertensive urgency [I16.0] 04/24/2021 Yes    ESRD (end stage renal disease) on dialysis [N18.6, Z99.2] 07/22/2022 Not Applicable     Chronic    Type 2 diabetes mellitus with chronic kidney disease on chronic dialysis, with long-term current use of insulin [E11.22, N18.6, Z99.2, Z79.4] 10/16/2019 Not Applicable     Chronic      Problems Resolved During this Admission:       Discharged Condition: good    Disposition:     Follow Up:    Patient Instructions:   No discharge procedures on file.    Significant Diagnostic Studies: Labs:   BMP:   Recent Labs   Lab 12/23/22  1125 12/23/22  1645 12/24/22  0330   * 161* 122*   * 137 136   K 4.3 3.8 4.0   CL 97 100 101   CO2 28 30* 29   BUN 39* 19 23*   CREATININE 5.0* 2.9* 3.5*   CALCIUM 8.3* 8.5* 8.5*   MG  --   --  1.7    and CMP   Recent Labs   Lab 12/23/22  0350 12/23/22  0656 12/23/22  1125 12/23/22  1645 12/24/22  0330   *  --  133* 137 136   K 5.0  --  4.3 3.8 4.0   CL 94*  --  97 100 101   CO2 23  --  28 30* 29   *   < > 444* 161* 122*   BUN 36*  --  39* 19 23*   CREATININE 5.9*  --  5.0* 2.9* 3.5*   CALCIUM 8.7  --  8.3* 8.5* 8.5*   PROT  "7.2  --   --   --   --    ALBUMIN 3.4*  --   --   --   --    BILITOT 1.7*  --   --   --   --    ALKPHOS 337*  --   --   --   --    AST 76*  --   --   --   --    ALT 97*  --   --   --   --    ANIONGAP 15  --  8 7* 6*    < > = values in this interval not displayed.       Pending Diagnostic Studies:     None         Medications:  Reconciled Home Medications:      Medication List      ASK your doctor about these medications    albuterol 90 mcg/actuation inhaler  Commonly known as: PROVENTIL/VENTOLIN HFA  Inhale 2 puffs into the lungs every 6 (six) hours as needed for Wheezing. Rescue     amLODIPine 10 MG tablet  Commonly known as: NORVASC  Take 1 tablet (10 mg total) by mouth once daily.     BD ULTRA-FINE MAGALIE PEN NEEDLE 32 gauge x 5/32" Ndle  Generic drug: pen needle, diabetic  Inject into the skin 5 times per day with insulin     carvediloL 25 MG tablet  Commonly known as: COREG  Take 1 tablet (25 mg total) by mouth 2 (two) times daily.     cloNIDine 0.2 mg/24 hr td ptwk 0.2 mg/24 hr  Commonly known as: CATAPRES  Place 1 patch onto the skin every 7 days.     dicyclomine 10 MG capsule  Commonly known as: BENTYL  Take 1 capsule (10 mg total) by mouth 3 (three) times daily.     ELIQUIS 5 mg Tab  Generic drug: apixaban  Take 1 tablet (5 mg total) by mouth 2 (two) times daily.     FLUoxetine 20 MG capsule  Take 1 capsule (20 mg total) by mouth once daily.     gabapentin 100 MG capsule  Commonly known as: NEURONTIN  Take 1 capsule (100 mg total) by mouth 2 (two) times daily.     hydrALAZINE 100 MG tablet  Commonly known as: APRESOLINE  Take 1 tablet (100 mg total) by mouth every 8 (eight) hours.     insulin detemir U-100 100 unit/mL (3 mL) Inpn pen  Commonly known as: Levemir FLEXTOUCH  Inject 9 Units into the skin once daily.     isosorbide mononitrate 60 MG 24 hr tablet  Commonly known as: IMDUR  Take 2 tablets (120 mg total) by mouth once daily.     levETIRAcetam 500 MG Tab  Commonly known as: KEPPRA  Take 1 tablet (500 " mg total) by mouth 2 (two) times daily.     mirtazapine 15 MG disintegrating tablet  Commonly known as: REMERON SOL-TAB  Take 1 tablet (15 mg total) by mouth nightly.     NovoLOG Flexpen U-100 Insulin 100 unit/mL (3 mL) Inpn pen  Generic drug: insulin aspart U-100  Inject 1-10 Units into the skin before meals and at bedtime as needed (Hyperglycemia). Max daily dose 40 units.     ondansetron 4 MG tablet  Commonly known as: ZOFRAN  Take 1 tablet (4 mg total) by mouth every 8 (eight) hours as needed for Nausea.     pantoprazole 40 MG tablet  Commonly known as: PROTONIX  Take 40 mg by mouth once daily.     promethazine 25 MG tablet  Commonly known as: PHENERGAN  Take 1 tablet (25 mg total) by mouth every 12 (twelve) hours as needed (nausea/vomiting not relieved with ondansetron (zofran)).            Indwelling Lines/Drains at time of discharge:   Lines/Drains/Airways     Central Venous Catheter Line  Duration                Hemodialysis Catheter 12/09/22 right subclavian 15 days            Seen in HD   General: Patient resting comfortably in no acute distress. Appears as stated age. Calm  Eyes: EOM intact. No conjunctivae injection. No scleral icterus.  ENT: Hearing grossly intact. No discharge from ears. No nasal discharge.   CVS: RRR. No LE edema BL.  Lungs: . Good breath sounds. No accessory muscle use. No acute respiratory distress  Neuro: non focal , Follows commands. Responds appropriately  Time spent on the discharge of patient: 22 minutes         Armani Gurrola MD  Department of Hospital Medicine  [facility]

## 2022-12-24 NOTE — CONSULTS
INPATIENT NEPHROLOGY Progress Note   [facility]    Patient Name: Tabby Howard  Date: 12/24/2022    Reason for consultation: ESRD    Chief Complaint: N/V    History of Present Illness:  32-year-old female presents with nausea vomiting abdominal pain.  History CHF, seizure disorder, ESRD (TTS dialysis), hypertension, DMI and gastroparesis.  Patient reports symptom onset today.  Patient reports generalized abdominal pain associated nausea and vomiting.  Patient reports pain as severe.  Patient states different than prior episodes however unable to state how it is different.  Multiple previous presentations for generalized abdominal pain.  Patient reports she lost her medications 2 days ago. Patient has been without medications for the last 2 days.  Patient last dialyzed Tuesday, 3 days prior.  Per EMR most recent hospitalization 2 weeks prior for similar presentation of generalized abdominal pain at [facility].  Imaging revealed pericardial effusion and anasarca of the abdomen.Transferred to [facility] for ICU care. Consulted for dialysis.    CT AP:  Moderate anasarca.  Mild pulmonary edema.  New small right pleural effusion.  Unchanged moderate pericardial effusion. New small volume ascites.    Interval History:  12/23- on insulin gtt, getting blood transfusion, was on cardene gtt but SBP 110s so told ICU nurse to stop, dry MM, diffuse abd pain  12/24- no issues with HD yest- got off 2L- VSS, on 2L NC, seen on HD    Plan of Care:    Assessment:  ESRD on HD TTS  HTN/D CHF/Pericardial effusion  Hyponatremia  DMI, uncontrolled/Gastroparesis  SHPT  Anemia of CKD  HypoMg    Plan:    - continue HD TTS schedule  - BP controlled with home regimen- hold diuretics for now- imaging noted- tolerated 2L UF yest- try 2-4L UF today  - renal diet, 1.5L fluid restriction  - off insulin gtt, control pain as able  - phos range is normal  - s/p blood transfusion yest- Hb is better- ordered SHELLEY with HD TTS  - ordered Mg  repletion    Discharge per hospitalist    Thank you for allowing us to participate in this patient's care. We will continue to follow.    Vital Signs:  Temp Readings from Last 3 Encounters:   12/24/22 98.9 °F (37.2 °C) (Oral)   12/23/22 98.4 °F (36.9 °C)   12/14/22 98 °F (36.7 °C)       Pulse Readings from Last 3 Encounters:   12/24/22 84   12/23/22 89   12/14/22 91       BP Readings from Last 3 Encounters:   12/24/22 97/65   12/23/22 (!) 162/84   12/14/22 (!) 169/99       Weight:  Wt Readings from Last 3 Encounters:   12/23/22 59.9 kg (132 lb 0.9 oz)   12/23/22 59.9 kg (132 lb)   12/23/22 59.9 kg (132 lb)       Medications:  Scheduled Meds:   amLODIPine  10 mg Oral Daily    carvediloL  25 mg Oral BID    cloNIDine 0.2 mg/24 hr td ptwk  1 patch Transdermal Q7 Days    dicyclomine  10 mg Oral TID    epoetin teresa-ebpx (RETACRIT) injection  150 Units/kg Intravenous Every Tues, Thurs, Sat    epoetin teresa-epbx  150 Units/kg Subcutaneous Once    FLUoxetine  20 mg Oral Daily    gabapentin  100 mg Oral BID    hydrALAZINE  100 mg Oral Q8H    insulin detemir U-100  9 Units Subcutaneous Daily    isosorbide mononitrate  120 mg Oral Daily    levETIRAcetam  500 mg Oral BID    mirtazapine  15 mg Oral Nightly    pantoprazole  40 mg Oral Daily    sodium chloride 0.9%  1,000 mL Intravenous Once     Continuous Infusions:      PRN Meds:.calcium gluconate IVPB, calcium gluconate IVPB, calcium gluconate IVPB, dextrose 10 % in water (D10W), dextrose 10 % in water (D10W), dextrose 10%, dextrose 10%, dextrose 10%, dextrose 10%, glucagon (human recombinant), glucose, glucose, heparin (porcine), insulin aspart U-100, magnesium sulfate IVPB, magnesium sulfate IVPB, morphine, ondansetron, potassium chloride **AND** potassium chloride **AND** potassium chloride, sodium chloride 0.9%, sodium phosphate IVPB, sodium phosphate IVPB, sodium phosphate IVPB  Current Facility-Administered Medications on File Prior to Encounter   Medication Dose Route  Frequency Provider Last Rate Last Admin    hydrALAZINE (APRESOLINE) 20 mg/mL injection             [] insulin regular 100 unit/mL injection             [] morphine 4 mg/mL injection             [] niCARdipine (CARDENE) 40 mg/200 mL (0.2 mg/mL) infusion             [] ondansetron 4 mg/2 mL injection             [] prochlorperazine (COMPAZINE) 10 mg/2 mL (5 mg/mL) injection Soln             [DISCONTINUED] 0.9%  NaCl infusion (for blood administration)   Intravenous Q24H PRN Nura Reyna MD        [DISCONTINUED] morphine injection 4 mg  4 mg Intravenous ED 1 Time Nura Reyna MD        [DISCONTINUED] niCARdipine 40 mg/200 mL (0.2 mg/mL) infusion  5 mg/hr Intravenous Continuous Nura Reyna MD 25 mL/hr at 22 0520 5 mg/hr at 22 0520     Current Outpatient Medications on File Prior to Encounter   Medication Sig Dispense Refill    albuterol (PROVENTIL/VENTOLIN HFA) 90 mcg/actuation inhaler Inhale 2 puffs into the lungs every 6 (six) hours as needed for Wheezing. Rescue 18 g 3    amLODIPine (NORVASC) 10 MG tablet Take 1 tablet (10 mg total) by mouth once daily. 30 tablet 11    apixaban (ELIQUIS) 5 mg Tab Take 1 tablet (5 mg total) by mouth 2 (two) times daily. 60 tablet 2    carvediloL (COREG) 25 MG tablet Take 1 tablet (25 mg total) by mouth 2 (two) times daily. 60 tablet 2    cloNIDine 0.2 mg/24 hr td ptwk (CATAPRES) 0.2 mg/24 hr Place 1 patch onto the skin every 7 days. 4 patch 2    dicyclomine (BENTYL) 10 MG capsule Take 1 capsule (10 mg total) by mouth 3 (three) times daily. 90 capsule 0    FLUoxetine 20 MG capsule Take 1 capsule (20 mg total) by mouth once daily. 30 capsule 11    gabapentin (NEURONTIN) 100 MG capsule Take 1 capsule (100 mg total) by mouth 2 (two) times daily. 90 capsule 2    hydrALAZINE (APRESOLINE) 100 MG tablet Take 1 tablet (100 mg total) by mouth every 8 (eight) hours. 90 tablet 2    insulin aspart U-100 (NOVOLOG) 100 unit/mL (3 mL) InPn  "pen Inject 1-10 Units into the skin before meals and at bedtime as needed (Hyperglycemia). Max daily dose 40 units. 3 mL 6    insulin detemir U-100 (LEVEMIR FLEXTOUCH) 100 unit/mL (3 mL) SubQ InPn pen Inject 9 Units into the skin once daily. 6 mL 2    isosorbide mononitrate (IMDUR) 60 MG 24 hr tablet Take 2 tablets (120 mg total) by mouth once daily. 30 tablet 11    levETIRAcetam (KEPPRA) 500 MG Tab Take 1 tablet (500 mg total) by mouth 2 (two) times daily. 60 tablet 11    mirtazapine (REMERON SOL-TAB) 15 MG disintegrating tablet Take 1 tablet (15 mg total) by mouth nightly. 90 tablet 0    ondansetron (ZOFRAN) 4 MG tablet Take 1 tablet (4 mg total) by mouth every 8 (eight) hours as needed for Nausea. 60 tablet 2    pantoprazole (PROTONIX) 40 MG tablet Take 40 mg by mouth once daily.      pen needle, diabetic (BD ULTRA-FINE MAGALIE PEN NEEDLE) 32 gauge x 5/32" Ndle Inject into the skin 5 times per day with insulin 100 each 12    promethazine (PHENERGAN) 25 MG tablet Take 1 tablet (25 mg total) by mouth every 12 (twelve) hours as needed (nausea/vomiting not relieved with ondansetron (zofran)). 60 tablet 0    [DISCONTINUED] atenoloL (TENORMIN) 50 MG tablet Take 1 tablet (50 mg total) by mouth every other day. 45 tablet 3    [DISCONTINUED] omeprazole (PRILOSEC) 20 MG capsule Take 2 capsules (40 mg total) by mouth once daily. for 10 days 20 capsule 0    [DISCONTINUED] torsemide (DEMADEX) 20 MG Tab Take 1 tablet (20 mg total) by mouth 2 (two) times a day. (Patient taking differently: Take 20 mg by mouth once daily.) 60 tablet 1       Review of Systems:  Neg    Physical Exam:  Constitutional: nad, aao x 3  Heart: rrr, no m/r/g, wwp, no worsening edema  Lungs: ctab, no w/r/r/c, no lb  Abdomen: s/nt/nd, +BS    Results:  Lab Results   Component Value Date     12/24/2022    K 4.0 12/24/2022     12/24/2022    CO2 29 12/24/2022    BUN 23 (H) 12/24/2022    CREATININE 3.5 (H) 12/24/2022    CALCIUM 8.5 (L) 12/24/2022    " ANIONGAP 6 (L) 12/24/2022    ESTGFRAFRICA 20 (A) 07/31/2022    EGFRNONAA 18 (A) 07/31/2022       Lab Results   Component Value Date    CALCIUM 8.5 (L) 12/24/2022    PHOS 3.2 12/14/2022       Recent Labs   Lab 12/24/22  0330   WBC 5.77   RBC 2.93*   HGB 8.1*   HCT 24.0*   PLT 94*   MCV 82   MCH 27.6   MCHC 33.8         I have personally reviewed pertinent radiological imaging and reports.    I have spent > 35 minutes providing care for this patient for the above diagnoses. These services have included chart/data/imaging review, evaluation, exam, formulation of plan, , note preparation, and discussions with staff involved in this patient's care.    Prateek Clark MD MPH  [facility]  [address] [place] [id number]  [phone] (p)  [phone] (f)

## 2022-12-24 NOTE — PLAN OF CARE
BP stable, blood glucose controlled. Pain mgmt, c/o back & abd pain/nausea. Dialysis to be done today, anuric, continent of bm.     Problem: Adult Inpatient Plan of Care  Goal: Plan of Care Review  Outcome: Ongoing, Progressing  Goal: Patient-Specific Goal (Individualized)  Outcome: Ongoing, Progressing  Goal: Absence of Hospital-Acquired Illness or Injury  Outcome: Ongoing, Progressing  Goal: Optimal Comfort and Wellbeing  Outcome: Ongoing, Progressing  Goal: Readiness for Transition of Care  Outcome: Ongoing, Progressing     Problem: Diabetes Comorbidity  Goal: Blood Glucose Level Within Targeted Range  Outcome: Ongoing, Progressing     Problem: Device-Related Complication Risk (Hemodialysis)  Goal: Safe, Effective Therapy Delivery  Outcome: Ongoing, Progressing     Problem: Hemodynamic Instability (Hemodialysis)  Goal: Effective Tissue Perfusion  Outcome: Ongoing, Progressing     Problem: Fall Injury Risk  Goal: Absence of Fall and Fall-Related Injury  Outcome: Ongoing, Progressing

## 2022-12-24 NOTE — PROGRESS NOTES
C3 nurse attempted to contact Tabby Howard for a TCC post hospital discharge follow up call. No answer. Left voicemail with callback information. Father contacted but unable to complete medication reconciliation. The patient has a scheduled HOSFU appointment with [facility] on 10/20/2022 @ 9165 - QO note. Provider not within [place].       
slurred speech

## 2022-12-27 RX ORDER — PROMETHAZINE HYDROCHLORIDE 25 MG/1
25 TABLET ORAL EVERY 12 HOURS PRN
Qty: 60 TABLET | Refills: 0 | Status: CANCELLED | OUTPATIENT
Start: 2022-12-27 | End: 2023-01-26

## 2023-01-01 NOTE — NURSING
Baby remains on radiant warmer. Apical 120/SaO2 92% room air. resp 56. Orders received. pts tunnel cath in left chest wall does not pull back and does not work on dialysis machine, Dr Lewis notified, Cath natalee ordered

## 2023-01-03 NOTE — PROGRESS NOTES
CHW - Outreach Attempt      Community Health Worker to attempt to contact patient on:  10/19/22 regarding: Initial outreach and enrollment.      
CHW - Outreach Attempt     Community Health Worker to attempt to contact patient on: 10/25/22 This Community Health Worker spoke to pt father. Father of the patient stated the pt was at dialysis and he will make sure that she gets in contact with this Community Health Worker to tell her needs for help.      
CHW - Outreach Attempt    Community Health Worker left a voicemail message for 2nd attempt to contact patient regarding: Initial outreach visit and Enrollment. Pt is currently admitted in the hospital.     
CHW - Unable to Contact    Community Health Worker to close episode at this time due to three missed attempts for patient contact.      
No cyanosis, clubbing or edema

## 2023-01-05 ENCOUNTER — HOSPITAL ENCOUNTER (OUTPATIENT)
Facility: HOSPITAL | Age: 33
Discharge: HOME | End: 2023-01-07
Attending: EMERGENCY MEDICINE | Admitting: STUDENT IN AN ORGANIZED HEALTH CARE EDUCATION/TRAINING PROGRAM
Payer: MEDICAID

## 2023-01-05 DIAGNOSIS — N18.6 ESRD (END STAGE RENAL DISEASE): ICD-10-CM

## 2023-01-05 DIAGNOSIS — R73.9 HYPERGLYCEMIA: ICD-10-CM

## 2023-01-05 DIAGNOSIS — I10 HTN (HYPERTENSION): ICD-10-CM

## 2023-01-05 DIAGNOSIS — I16.1 HYPERTENSIVE EMERGENCY: Primary | ICD-10-CM

## 2023-01-05 LAB
ALBUMIN SERPL BCP-MCNC: 3.8 G/DL (ref 3.5–5.2)
ALLENS TEST: ABNORMAL
ALP SERPL-CCNC: 358 U/L (ref 55–135)
ALT SERPL W/O P-5'-P-CCNC: 147 U/L (ref 10–44)
ANION GAP SERPL CALC-SCNC: 16 MMOL/L (ref 8–16)
AST SERPL-CCNC: 192 U/L (ref 10–40)
BASOPHILS # BLD AUTO: 0.02 K/UL (ref 0–0.2)
BASOPHILS NFR BLD: 0.5 % (ref 0–1.9)
BILIRUB SERPL-MCNC: 1.5 MG/DL (ref 0.1–1)
BUN SERPL-MCNC: 36 MG/DL (ref 6–20)
CALCIUM SERPL-MCNC: 9.1 MG/DL (ref 8.7–10.5)
CHLORIDE SERPL-SCNC: 97 MMOL/L (ref 95–110)
CO2 SERPL-SCNC: 25 MMOL/L (ref 23–29)
CREAT SERPL-MCNC: 4.9 MG/DL (ref 0.5–1.4)
DELSYS: ABNORMAL
DIFFERENTIAL METHOD: ABNORMAL
EOSINOPHIL # BLD AUTO: 0.1 K/UL (ref 0–0.5)
EOSINOPHIL NFR BLD: 1.8 % (ref 0–8)
ERYTHROCYTE [DISTWIDTH] IN BLOOD BY AUTOMATED COUNT: 15.8 % (ref 11.5–14.5)
EST. GFR  (NO RACE VARIABLE): 11.3 ML/MIN/1.73 M^2
GLUCOSE SERPL-MCNC: 352 MG/DL (ref 70–110)
GLUCOSE SERPL-MCNC: 489 MG/DL (ref 70–110)
GLUCOSE SERPL-MCNC: 494 MG/DL (ref 70–110)
HCG INTACT+B SERPL-ACNC: 4.4 MIU/ML
HCO3 UR-SCNC: 29.5 MMOL/L (ref 24–28)
HCT VFR BLD AUTO: 22 % (ref 37–48.5)
HGB BLD-MCNC: 7.3 G/DL (ref 12–16)
IMM GRANULOCYTES # BLD AUTO: 0.03 K/UL (ref 0–0.04)
IMM GRANULOCYTES NFR BLD AUTO: 0.8 % (ref 0–0.5)
LIPASE SERPL-CCNC: 41 U/L (ref 4–60)
LYMPHOCYTES # BLD AUTO: 0.6 K/UL (ref 1–4.8)
LYMPHOCYTES NFR BLD: 14.7 % (ref 18–48)
MCH RBC QN AUTO: 27.1 PG (ref 27–31)
MCHC RBC AUTO-ENTMCNC: 33.2 G/DL (ref 32–36)
MCV RBC AUTO: 82 FL (ref 82–98)
MODE: ABNORMAL
MONOCYTES # BLD AUTO: 0.3 K/UL (ref 0.3–1)
MONOCYTES NFR BLD: 7.7 % (ref 4–15)
NEUTROPHILS # BLD AUTO: 2.9 K/UL (ref 1.8–7.7)
NEUTROPHILS NFR BLD: 74.5 % (ref 38–73)
NRBC BLD-RTO: 0 /100 WBC
PCO2 BLDA: 44.8 MMHG (ref 35–45)
PH SMN: 7.43 [PH] (ref 7.35–7.45)
PLATELET # BLD AUTO: 73 K/UL (ref 150–450)
PMV BLD AUTO: 10.4 FL (ref 9.2–12.9)
PO2 BLDA: 49 MMHG (ref 40–60)
POC BE: 5 MMOL/L
POC SATURATED O2: 85 % (ref 95–100)
POC TCO2: 31 MMOL/L (ref 24–29)
POTASSIUM SERPL-SCNC: 5 MMOL/L (ref 3.5–5.1)
PROT SERPL-MCNC: 8.2 G/DL (ref 6–8.4)
RBC # BLD AUTO: 2.69 M/UL (ref 4–5.4)
SAMPLE: ABNORMAL
SITE: ABNORMAL
SODIUM SERPL-SCNC: 138 MMOL/L (ref 136–145)
WBC # BLD AUTO: 3.88 K/UL (ref 3.9–12.7)

## 2023-01-05 PROCEDURE — 80074 ACUTE HEPATITIS PANEL: CPT | Performed by: EMERGENCY MEDICINE

## 2023-01-05 PROCEDURE — 96374 THER/PROPH/DIAG INJ IV PUSH: CPT

## 2023-01-05 PROCEDURE — 99285 EMERGENCY DEPT VISIT HI MDM: CPT | Mod: 25

## 2023-01-05 PROCEDURE — 93010 ELECTROCARDIOGRAM REPORT: CPT | Mod: ,,, | Performed by: INTERNAL MEDICINE

## 2023-01-05 PROCEDURE — 82803 BLOOD GASES ANY COMBINATION: CPT

## 2023-01-05 PROCEDURE — 82962 GLUCOSE BLOOD TEST: CPT

## 2023-01-05 PROCEDURE — 93005 ELECTROCARDIOGRAM TRACING: CPT | Performed by: INTERNAL MEDICINE

## 2023-01-05 PROCEDURE — 25000003 PHARM REV CODE 250: Performed by: STUDENT IN AN ORGANIZED HEALTH CARE EDUCATION/TRAINING PROGRAM

## 2023-01-05 PROCEDURE — 84702 CHORIONIC GONADOTROPIN TEST: CPT | Performed by: NURSE PRACTITIONER

## 2023-01-05 PROCEDURE — G0378 HOSPITAL OBSERVATION PER HR: HCPCS

## 2023-01-05 PROCEDURE — 25000003 PHARM REV CODE 250: Performed by: EMERGENCY MEDICINE

## 2023-01-05 PROCEDURE — 96376 TX/PRO/DX INJ SAME DRUG ADON: CPT | Mod: 59

## 2023-01-05 PROCEDURE — 94799 UNLISTED PULMONARY SVC/PX: CPT

## 2023-01-05 PROCEDURE — 96375 TX/PRO/DX INJ NEW DRUG ADDON: CPT | Mod: 59

## 2023-01-05 PROCEDURE — 90935 HEMODIALYSIS ONE EVALUATION: CPT

## 2023-01-05 PROCEDURE — 83690 ASSAY OF LIPASE: CPT | Performed by: NURSE PRACTITIONER

## 2023-01-05 PROCEDURE — 63600175 PHARM REV CODE 636 W HCPCS: Performed by: EMERGENCY MEDICINE

## 2023-01-05 PROCEDURE — 36415 COLL VENOUS BLD VENIPUNCTURE: CPT | Performed by: NURSE PRACTITIONER

## 2023-01-05 PROCEDURE — 99900031 HC PATIENT EDUCATION (STAT)

## 2023-01-05 PROCEDURE — 93010 EKG 12-LEAD: ICD-10-PCS | Mod: ,,, | Performed by: INTERNAL MEDICINE

## 2023-01-05 PROCEDURE — 85025 COMPLETE CBC W/AUTO DIFF WBC: CPT | Performed by: NURSE PRACTITIONER

## 2023-01-05 PROCEDURE — 25000003 PHARM REV CODE 250: Performed by: INTERNAL MEDICINE

## 2023-01-05 PROCEDURE — 99900035 HC TECH TIME PER 15 MIN (STAT)

## 2023-01-05 PROCEDURE — 80053 COMPREHEN METABOLIC PANEL: CPT | Performed by: NURSE PRACTITIONER

## 2023-01-05 RX ORDER — DICYCLOMINE HYDROCHLORIDE 10 MG/1
10 CAPSULE ORAL 3 TIMES DAILY
Status: DISCONTINUED | OUTPATIENT
Start: 2023-01-05 | End: 2023-01-07 | Stop reason: HOSPADM

## 2023-01-05 RX ORDER — IBUPROFEN 200 MG
16 TABLET ORAL
Status: DISCONTINUED | OUTPATIENT
Start: 2023-01-05 | End: 2023-01-07 | Stop reason: HOSPADM

## 2023-01-05 RX ORDER — SODIUM CHLORIDE 0.9 % (FLUSH) 0.9 %
10 SYRINGE (ML) INJECTION EVERY 12 HOURS
Status: DISCONTINUED | OUTPATIENT
Start: 2023-01-05 | End: 2023-01-07 | Stop reason: HOSPADM

## 2023-01-05 RX ORDER — ONDANSETRON 2 MG/ML
4 INJECTION INTRAMUSCULAR; INTRAVENOUS
Status: COMPLETED | OUTPATIENT
Start: 2023-01-05 | End: 2023-01-05

## 2023-01-05 RX ORDER — INSULIN LISPRO 100 [IU]/ML
8 INJECTION, SOLUTION INTRAVENOUS; SUBCUTANEOUS
COMMUNITY
End: 2023-01-25

## 2023-01-05 RX ORDER — INSULIN ASPART 100 [IU]/ML
1-10 INJECTION, SOLUTION INTRAVENOUS; SUBCUTANEOUS
Status: DISCONTINUED | OUTPATIENT
Start: 2023-01-05 | End: 2023-01-07 | Stop reason: HOSPADM

## 2023-01-05 RX ORDER — SODIUM CHLORIDE 9 MG/ML
500 INJECTION, SOLUTION INTRAVENOUS
Status: COMPLETED | OUTPATIENT
Start: 2023-01-05 | End: 2023-01-05

## 2023-01-05 RX ORDER — POLYETHYLENE GLYCOL 3350 17 G/17G
17 POWDER, FOR SOLUTION ORAL DAILY
Status: DISCONTINUED | OUTPATIENT
Start: 2023-01-06 | End: 2023-01-07 | Stop reason: HOSPADM

## 2023-01-05 RX ORDER — FUROSEMIDE 40 MG/1
40 TABLET ORAL 2 TIMES DAILY
COMMUNITY
Start: 2022-12-14 | End: 2023-03-20

## 2023-01-05 RX ORDER — LEVETIRACETAM 500 MG/1
500 TABLET ORAL 2 TIMES DAILY
Status: DISCONTINUED | OUTPATIENT
Start: 2023-01-05 | End: 2023-01-07 | Stop reason: HOSPADM

## 2023-01-05 RX ORDER — HYDROMORPHONE HYDROCHLORIDE 1 MG/ML
1 INJECTION, SOLUTION INTRAMUSCULAR; INTRAVENOUS; SUBCUTANEOUS EVERY 6 HOURS PRN
Status: DISCONTINUED | OUTPATIENT
Start: 2023-01-05 | End: 2023-01-07 | Stop reason: HOSPADM

## 2023-01-05 RX ORDER — CARVEDILOL 25 MG/1
25 TABLET ORAL 2 TIMES DAILY
Status: DISCONTINUED | OUTPATIENT
Start: 2023-01-05 | End: 2023-01-07 | Stop reason: HOSPADM

## 2023-01-05 RX ORDER — IBUPROFEN 200 MG
24 TABLET ORAL
Status: DISCONTINUED | OUTPATIENT
Start: 2023-01-05 | End: 2023-01-07 | Stop reason: HOSPADM

## 2023-01-05 RX ORDER — AMOXICILLIN 250 MG
1 CAPSULE ORAL 2 TIMES DAILY
Status: DISCONTINUED | OUTPATIENT
Start: 2023-01-05 | End: 2023-01-07 | Stop reason: HOSPADM

## 2023-01-05 RX ORDER — SODIUM CHLORIDE 9 MG/ML
1000 INJECTION, SOLUTION INTRAVENOUS
Status: DISCONTINUED | OUTPATIENT
Start: 2023-01-05 | End: 2023-01-05

## 2023-01-05 RX ORDER — HYDRALAZINE HYDROCHLORIDE 20 MG/ML
10 INJECTION INTRAMUSCULAR; INTRAVENOUS
Status: COMPLETED | OUTPATIENT
Start: 2023-01-05 | End: 2023-01-05

## 2023-01-05 RX ORDER — GABAPENTIN 100 MG/1
100 CAPSULE ORAL 2 TIMES DAILY
Status: DISCONTINUED | OUTPATIENT
Start: 2023-01-05 | End: 2023-01-07 | Stop reason: HOSPADM

## 2023-01-05 RX ORDER — HYDRALAZINE HYDROCHLORIDE 25 MG/1
100 TABLET, FILM COATED ORAL EVERY 8 HOURS
Status: DISCONTINUED | OUTPATIENT
Start: 2023-01-05 | End: 2023-01-07 | Stop reason: HOSPADM

## 2023-01-05 RX ORDER — ISOSORBIDE MONONITRATE 60 MG/1
120 TABLET, EXTENDED RELEASE ORAL DAILY
Status: DISCONTINUED | OUTPATIENT
Start: 2023-01-06 | End: 2023-01-07 | Stop reason: HOSPADM

## 2023-01-05 RX ORDER — GLUCAGON 1 MG
1 KIT INJECTION
Status: DISCONTINUED | OUTPATIENT
Start: 2023-01-05 | End: 2023-01-07 | Stop reason: HOSPADM

## 2023-01-05 RX ORDER — ALBUTEROL SULFATE 90 UG/1
2 AEROSOL, METERED RESPIRATORY (INHALATION) EVERY 6 HOURS PRN
Status: DISCONTINUED | OUTPATIENT
Start: 2023-01-05 | End: 2023-01-05

## 2023-01-05 RX ORDER — MORPHINE SULFATE 4 MG/ML
4 INJECTION, SOLUTION INTRAMUSCULAR; INTRAVENOUS
Status: COMPLETED | OUTPATIENT
Start: 2023-01-05 | End: 2023-01-05

## 2023-01-05 RX ORDER — CLONIDINE 0.2 MG/24H
1 PATCH, EXTENDED RELEASE TRANSDERMAL
Status: DISCONTINUED | OUTPATIENT
Start: 2023-01-06 | End: 2023-01-07 | Stop reason: HOSPADM

## 2023-01-05 RX ORDER — TALC
6 POWDER (GRAM) TOPICAL NIGHTLY PRN
Status: DISCONTINUED | OUTPATIENT
Start: 2023-01-05 | End: 2023-01-07 | Stop reason: HOSPADM

## 2023-01-05 RX ORDER — LOSARTAN POTASSIUM AND HYDROCHLOROTHIAZIDE 12.5; 1 MG/1; MG/1
1 TABLET ORAL DAILY
Status: ON HOLD | COMMUNITY
Start: 2022-03-15 | End: 2023-03-13 | Stop reason: HOSPADM

## 2023-01-05 RX ORDER — HYDROMORPHONE HYDROCHLORIDE 1 MG/ML
1 INJECTION, SOLUTION INTRAMUSCULAR; INTRAVENOUS; SUBCUTANEOUS
Status: COMPLETED | OUTPATIENT
Start: 2023-01-05 | End: 2023-01-05

## 2023-01-05 RX ORDER — AMLODIPINE BESYLATE 5 MG/1
10 TABLET ORAL DAILY
Status: DISCONTINUED | OUTPATIENT
Start: 2023-01-06 | End: 2023-01-07 | Stop reason: HOSPADM

## 2023-01-05 RX ORDER — ALBUTEROL SULFATE 0.83 MG/ML
2.5 SOLUTION RESPIRATORY (INHALATION) EVERY 6 HOURS PRN
Status: DISCONTINUED | OUTPATIENT
Start: 2023-01-05 | End: 2023-01-07 | Stop reason: HOSPADM

## 2023-01-05 RX ORDER — ACETAMINOPHEN 325 MG/1
650 TABLET ORAL EVERY 8 HOURS PRN
Status: DISCONTINUED | OUTPATIENT
Start: 2023-01-05 | End: 2023-01-07 | Stop reason: HOSPADM

## 2023-01-05 RX ORDER — FERROUS SULFATE 325(65) MG
325 TABLET, DELAYED RELEASE (ENTERIC COATED) ORAL DAILY
COMMUNITY
Start: 2022-03-15 | End: 2023-03-20

## 2023-01-05 RX ORDER — PANTOPRAZOLE SODIUM 40 MG/1
40 TABLET, DELAYED RELEASE ORAL
Status: DISCONTINUED | OUTPATIENT
Start: 2023-01-06 | End: 2023-01-07 | Stop reason: HOSPADM

## 2023-01-05 RX ORDER — HYDROCODONE BITARTRATE AND ACETAMINOPHEN 7.5; 325 MG/1; MG/1
1 TABLET ORAL EVERY 4 HOURS PRN
Status: DISCONTINUED | OUTPATIENT
Start: 2023-01-05 | End: 2023-01-07 | Stop reason: HOSPADM

## 2023-01-05 RX ORDER — INSULIN GLARGINE 100 [IU]/ML
28 INJECTION, SOLUTION SUBCUTANEOUS DAILY
COMMUNITY
End: 2023-01-25

## 2023-01-05 RX ORDER — FLUCONAZOLE 150 MG/1
150 TABLET ORAL DAILY
Status: ON HOLD | COMMUNITY
End: 2023-01-25 | Stop reason: HOSPADM

## 2023-01-05 RX ORDER — MIRTAZAPINE 15 MG/1
15 TABLET, FILM COATED ORAL NIGHTLY
Status: DISCONTINUED | OUTPATIENT
Start: 2023-01-05 | End: 2023-01-07 | Stop reason: HOSPADM

## 2023-01-05 RX ORDER — FLUOXETINE HYDROCHLORIDE 20 MG/1
20 CAPSULE ORAL DAILY
Status: DISCONTINUED | OUTPATIENT
Start: 2023-01-06 | End: 2023-01-07 | Stop reason: HOSPADM

## 2023-01-05 RX ADMIN — HYDRALAZINE HYDROCHLORIDE 10 MG: 20 INJECTION INTRAMUSCULAR; INTRAVENOUS at 11:01

## 2023-01-05 RX ADMIN — INSULIN HUMAN 6 UNITS: 100 INJECTION, SOLUTION PARENTERAL at 11:01

## 2023-01-05 RX ADMIN — LEVETIRACETAM 500 MG: 500 TABLET, FILM COATED ORAL at 11:01

## 2023-01-05 RX ADMIN — HYDROCODONE BITARTRATE AND ACETAMINOPHEN 1 TABLET: 7.5; 325 TABLET ORAL at 08:01

## 2023-01-05 RX ADMIN — INSULIN HUMAN 4 UNITS: 100 INJECTION, SOLUTION PARENTERAL at 02:01

## 2023-01-05 RX ADMIN — MORPHINE SULFATE 4 MG: 4 INJECTION, SOLUTION INTRAMUSCULAR; INTRAVENOUS at 11:01

## 2023-01-05 RX ADMIN — SODIUM CHLORIDE 250 ML: 0.9 INJECTION, SOLUTION INTRAVENOUS at 11:01

## 2023-01-05 RX ADMIN — MIRTAZAPINE 15 MG: 15 TABLET, FILM COATED ORAL at 11:01

## 2023-01-05 RX ADMIN — HYDROMORPHONE HYDROCHLORIDE 1 MG: 1 INJECTION, SOLUTION INTRAMUSCULAR; INTRAVENOUS; SUBCUTANEOUS at 01:01

## 2023-01-05 RX ADMIN — GABAPENTIN 100 MG: 100 CAPSULE ORAL at 11:01

## 2023-01-05 RX ADMIN — DICYCLOMINE HYDROCHLORIDE 10 MG: 10 CAPSULE ORAL at 11:01

## 2023-01-05 RX ADMIN — CARVEDILOL 25 MG: 25 TABLET, FILM COATED ORAL at 11:01

## 2023-01-05 RX ADMIN — ONDANSETRON 4 MG: 2 INJECTION INTRAMUSCULAR; INTRAVENOUS at 11:01

## 2023-01-05 NOTE — ED PROVIDER NOTES
Encounter Date: 2023       History     Chief Complaint   Patient presents with    Abdominal Pain    N/V     Patient presents complaining of abdominal pain, nausea, vomiting.  Patient has a history of chronic kidney disease receives dialysis on Tuesday, Thursday, Saturday.  Patient is due for dialysis today.  Patient sees .  At the worst symptoms are moderate.  Nothing really seems to make it better.  Patient also has a history of congestive heart failure.    Review of patient's allergies indicates:   Allergen Reactions    Penicillins Hives     Past Medical History:   Diagnosis Date    CKD (chronic kidney disease), stage IV 2022    Diabetes mellitus due to underlying condition with unspecified complications 2022    Gastroparesis 2022    Heart failure with preserved ejection fraction 2022    EF 55% on 3/22    History of gastroesophageal reflux (GERD)     History of supraventricular tachycardia     Hyperkalemia 2022    Hypertensive emergency 2022    Sickle cell trait 2022    Type 2 diabetes mellitus      Past Surgical History:   Procedure Laterality Date     SECTION      x 3    COLONOSCOPY      COLONOSCOPY N/A 2022    Procedure: COLONOSCOPY;  Surgeon: Jagdeep Cedeno MD;  Location: [facility];  Service: Endoscopy;  Laterality: N/A;    ESOPHAGOGASTRODUODENOSCOPY N/A 10/18/2019    Procedure: ESOPHAGOGASTRODUODENOSCOPY (EGD);  Surgeon: Gianluca Mendez MD;  Location: [facility];  Service: Endoscopy;  Laterality: N/A;    ESOPHAGOGASTRODUODENOSCOPY N/A 2022    Procedure: EGD (ESOPHAGOGASTRODUODENOSCOPY);  Surgeon: Micky Paredes III, MD;  Location: [facility];  Service: Endoscopy;  Laterality: N/A;    ESOPHAGOGASTRODUODENOSCOPY N/A 2022    Procedure: EGD (ESOPHAGOGASTRODUODENOSCOPY);  Surgeon: Marcelo Zhong MD;  Location: [facility];  Service: Endoscopy;  Laterality: N/A;    LAPAROSCOPIC CHOLECYSTECTOMY N/A 2022    Procedure: CHOLECYSTECTOMY,  LAPAROSCOPIC;  Surgeon: Grey Perez MD;  Location: [facility] OR;  Service: General;  Laterality: N/A;    PLACEMENT OF DUAL-LUMEN VASCULAR CATHETER Left 07/12/2022    Procedure: INSERTION-CATHETER-JOSEPH;  Surgeon: Dionte Gan MD;  Location: [facility] OR;  Service: General;  Laterality: Left;    PLACEMENT OF DUAL-LUMEN VASCULAR CATHETER Right 07/26/2022    Procedure: INSERTION-CATHETER-Hemosplit;  Surgeon: Dionte Gan MD;  Location: [facility] OR;  Service: General;  Laterality: Right;     Family History   Problem Relation Age of Onset    Diabetes Mother     Diabetes Father      Social History     Tobacco Use    Smoking status: Never    Smokeless tobacco: Never   Substance Use Topics    Alcohol use: Not Currently    Drug use: No     Review of Systems   All other systems reviewed and are negative.    Physical Exam     Initial Vitals [01/05/23 1023]   BP Pulse Resp Temp SpO2   (!) 195/114 92 18 97.8 °F (36.6 °C) 99 %      MAP       --         Physical Exam    Nursing note and vitals reviewed.  Constitutional: She appears well-developed and well-nourished. She appears distressed.   Patient appears in pain, weak, frail    HENT:   Head: Normocephalic and atraumatic.   Eyes: EOM are normal. Pupils are equal, round, and reactive to light.   Neck: Neck supple.   Normal range of motion.  Cardiovascular:  Normal rate, regular rhythm, normal heart sounds and intact distal pulses.           Pulmonary/Chest: Breath sounds normal. No respiratory distress.   Abdominal: Abdomen is soft. She exhibits no distension. There is abdominal tenderness.   Musculoskeletal:         General: Normal range of motion.      Cervical back: Normal range of motion and neck supple.     Neurological: She is alert and oriented to person, place, and time.   Skin: Skin is warm and dry. Capillary refill takes less than 2 seconds.   Psychiatric: She has a normal mood and affect. Her behavior is normal. Judgment and thought content normal.       ED Course    Procedures  Labs Reviewed   CBC W/ AUTO DIFFERENTIAL - Abnormal; Notable for the following components:       Result Value    WBC 3.88 (*)     RBC 2.69 (*)     Hemoglobin 7.3 (*)     Hematocrit 22.0 (*)     RDW 15.8 (*)     Platelets 73 (*)     Immature Granulocytes 0.8 (*)     Lymph # 0.6 (*)     Gran % 74.5 (*)     Lymph % 14.7 (*)     All other components within normal limits    Narrative:     For upper or mid abdominal pain.   COMPREHENSIVE METABOLIC PANEL - Abnormal; Notable for the following components:    Glucose 489 (*)     BUN 36 (*)     Creatinine 4.9 (*)     Total Bilirubin 1.5 (*)     Alkaline Phosphatase 358 (*)      (*)      (*)     eGFR 11.3 (*)     All other components within normal limits    Narrative:     For upper or mid abdominal pain.  Glucose critical result(s) repeated. Called and verbal readback   obtained from [facility], RN by MEAGAN 01/05/2023 11:38   POCT GLUCOSE - Abnormal; Notable for the following components:    POC Glucose 494 (*)     All other components within normal limits   ISTAT PROCEDURE - Abnormal; Notable for the following components:    POC HCO3 29.5 (*)     POC SATURATED O2 85 (*)     POC TCO2 31 (*)     All other components within normal limits   POCT GLUCOSE - Abnormal; Notable for the following components:    POC Glucose 352 (*)     All other components within normal limits   LIPASE    Narrative:     For upper or mid abdominal pain.   HCG, QUANTITATIVE   HCG, QUANTITATIVE   HEPATITIS PANEL, ACUTE   POCT CREATININE        ECG Results              EKG 12-lead (In process)  Result time 01/05/23 10:34:44      In process by Interface, Lab In [facility] (01/05/23 10:34:44)                   Narrative:    Test Reason : I10,    Vent. Rate : 090 BPM     Atrial Rate : 090 BPM     P-R Int : 180 ms          QRS Dur : 086 ms      QT Int : 414 ms       P-R-T Axes : 038 -34 038 degrees     QTc Int : 506 ms    Normal sinus rhythm  Possible Left atrial  enlargement  Left axis deviation  Prolonged QT  Abnormal ECG  When compared with ECG of 23-DEC-2022 03:43,  No significant change was found    Referred By: [email or web address]   SELF           Confirmed By:                                   Imaging Results              CT Abdomen Pelvis  Without Contrast (Final result)  Result time 01/05/23 13:04:54      Final result by Juaquin Will MD (01/05/23 13:04:54)                   Narrative:    CMS MANDATED QUALITY DATA - CT RADIATION - 436    All CT scans at this facility utilize dose modulation, iterative reconstruction, and/or weight based dosing when appropriate to reduce radiation dose to as low as reasonably achievable.        Reason: Abdominal pain, post-op; Abdominal abscess/infection suspected; Abdominal pain, acute, nonlocalized    TECHNIQUE: CT abdomen and pelvis without IV contrast.    COMPARISON: CT abdomen pelvis December 23, 2022.    FINDINGS:    There is subsegmental atelectasis versus infiltrates of the lung bases, more so on the left. The heart size is normal. A large pericardial effusion is noted. This is similar to previous exam.    Liver is normal size. No gross hepatic lesion identified. The gallbladder has been removed. The pancreas, spleen and adrenal glands are unremarkable. The kidneys are normal size. There is no hydronephrosis or nephrolithiasis. The ureters are not well visualized. The bladder is fluid-filled with no focal wall abnormalities. The uterus and adnexal structures are grossly unremarkable.    Large and small bowel are normal caliber. The appendix is normal. No bowel wall thickening or inflammatory changes observed. Stomach is unremarkable. There is trace fluid in the right pericolic gutter and in the subphrenic space. Marked atherosclerosis of intra-abdominal and pelvic arteries again demonstrated. There is subcutaneous fat stranding of the abdominal wall and upper thigh subcutaneous tissues, felt to reflect mild anasarca.    There is no  acute osseous abnormality.    IMPRESSION:    1.  Small amount of free fluid noted in the right pericolic gutter and in the subphrenic space.  2.  Evaluation is limited with lack of IV contrast.  3.  Large stable pericardial effusion.    Electronically signed by:  Juaquin Will DO  1/5/2023 1:04 PM [facility] Workstation: [id number]                                     Medications   morphine injection 4 mg (4 mg Intravenous Given 1/5/23 1103)   ondansetron injection 4 mg (4 mg Intravenous Given 1/5/23 1104)   insulin regular injection 6 Units 0.06 mL (6 Units Intravenous Given 1/5/23 1121)   0.9%  NaCl infusion (0 mLs Intravenous Stopped 1/5/23 1200)   hydrALAZINE injection 10 mg (10 mg Intravenous Given 1/5/23 1132)   HYDROmorphone injection 1 mg (1 mg Intravenous Given 1/5/23 1348)   insulin regular injection 4 Units 0.04 mL (4 Units Intravenous Given 1/5/23 1430)     Medical Decision Making:   History:   Old Medical Records: I decided to obtain old medical records.  Old Records Summarized: records from previous admission(s).  Initial Assessment:   Patient appears in pain  Differential Diagnosis:   Considerations include electrolyte abnormalities, dehydration, uremia, DKA, hyperglycemia, less likely appendicitis  Independently Interpreted Test(s):   I have ordered and independently interpreted EKG Reading(s) - see summary below       <> Summary of EKG Reading(s): EKG is regular rate and rhythm without ST elevation  Clinical Tests:   Lab Tests: Reviewed and Ordered  Radiological Study: Ordered and Reviewed  Medical Tests: Reviewed and Ordered  ED Management:  In the emergency department patient indeed is markedly hypertensive requires IV medication to control pressure.  Patient is hyperglycemic but no evidence of DKA.  Patient received IV insulin, IV Dilaudid, IV hydralazine to manage blood pressure, pain, blood sugar.  Patient's CT scan does not show an acute abnormality although there is fluid which is likely secondary  to volume overload.  Patient also has a large pericardial effusion this appears chronic.  Patient has been consulted to nephrology for dialysis.  I do not appreciate a surgical process in the abdomen.  She will be consulted to Hospital Medicine for further pain control and blood pressure control.      Attending Critical Care:   Critical Care Times:   Direct Patient Care (initial evaluation, reassessments, and time considering the case)...............................................................5 minutes.   Additional History from reviewing old medical records or taking additional history from the family, EMS, PCP, etc......................5 minutes.   Ordering, Reviewing, and Interpreting Diagnostic Studies..............................................................................................................5 minutes.   Documentation..................................................................................................................................................................................5 minutes.   ==============================================================  · Total Critical Care Time - exclusive of procedural time: 20 minutes.  ==============================================================  Critical care was necessary to treat or prevent imminent or life-threatening deterioration of the following conditions:  Hypertensive emergency.   Critical care was time spent personally by me on the following activities: obtaining history from patient or relative, examination of patient, review of x-rays / CT sent with the patient, ordering lab, x-rays, and/or EKG, development of treatment plan with patient or relative, ordering and performing treatments and interventions, interpretation of cardiac measurements and re-evaluation of patient's condition.   Critical Care Condition: potentially life-threatening     Discussed the case with  will put in orders for dialysis.                           Clinical Impression:   Final diagnoses:  [I10] HTN (hypertension)  [I16.1] Hypertensive emergency (Primary)  [R73.9] Hyperglycemia  [N18.6] ESRD (end stage renal disease)        ED Disposition Condition    Admit Stable                Edgard Lezama MD  01/05/23 3305

## 2023-01-06 LAB
ABO + RH BLD: NORMAL
ALBUMIN SERPL BCP-MCNC: 2.8 G/DL (ref 3.5–5.2)
ALP SERPL-CCNC: 294 U/L (ref 55–135)
ALT SERPL W/O P-5'-P-CCNC: 90 U/L (ref 10–44)
ANION GAP SERPL CALC-SCNC: 6 MMOL/L (ref 8–16)
AST SERPL-CCNC: 71 U/L (ref 10–40)
BASOPHILS # BLD AUTO: 0.02 K/UL (ref 0–0.2)
BASOPHILS NFR BLD: 0.6 % (ref 0–1.9)
BILIRUB SERPL-MCNC: 1.4 MG/DL (ref 0.1–1)
BLD GP AB SCN CELLS X3 SERPL QL: NORMAL
BLD PROD TYP BPU: NORMAL
BLD PROD TYP BPU: NORMAL
BLOOD UNIT EXPIRATION DATE: NORMAL
BLOOD UNIT EXPIRATION DATE: NORMAL
BLOOD UNIT TYPE CODE: 6200
BLOOD UNIT TYPE CODE: 6200
BLOOD UNIT TYPE: NORMAL
BLOOD UNIT TYPE: NORMAL
BUN SERPL-MCNC: 19 MG/DL (ref 6–20)
CALCIUM SERPL-MCNC: 8.5 MG/DL (ref 8.7–10.5)
CHLORIDE SERPL-SCNC: 104 MMOL/L (ref 95–110)
CO2 SERPL-SCNC: 27 MMOL/L (ref 23–29)
CODING SYSTEM: NORMAL
CODING SYSTEM: NORMAL
CREAT SERPL-MCNC: 3.1 MG/DL (ref 0.5–1.4)
DIFFERENTIAL METHOD: ABNORMAL
DISPENSE STATUS: NORMAL
DISPENSE STATUS: NORMAL
EOSINOPHIL # BLD AUTO: 0.1 K/UL (ref 0–0.5)
EOSINOPHIL NFR BLD: 3.7 % (ref 0–8)
ERYTHROCYTE [DISTWIDTH] IN BLOOD BY AUTOMATED COUNT: 15.6 % (ref 11.5–14.5)
EST. GFR  (NO RACE VARIABLE): 19.5 ML/MIN/1.73 M^2
GLUCOSE SERPL-MCNC: 160 MG/DL (ref 70–110)
GLUCOSE SERPL-MCNC: 160 MG/DL (ref 70–110)
HCT VFR BLD AUTO: 21.6 % (ref 37–48.5)
HGB BLD-MCNC: 7 G/DL (ref 12–16)
IMM GRANULOCYTES # BLD AUTO: 0.03 K/UL (ref 0–0.04)
IMM GRANULOCYTES NFR BLD AUTO: 0.8 % (ref 0–0.5)
LYMPHOCYTES # BLD AUTO: 0.9 K/UL (ref 1–4.8)
LYMPHOCYTES NFR BLD: 24.2 % (ref 18–48)
MCH RBC QN AUTO: 27.1 PG (ref 27–31)
MCHC RBC AUTO-ENTMCNC: 32.4 G/DL (ref 32–36)
MCV RBC AUTO: 84 FL (ref 82–98)
MONOCYTES # BLD AUTO: 0.3 K/UL (ref 0.3–1)
MONOCYTES NFR BLD: 8.5 % (ref 4–15)
NEUTROPHILS # BLD AUTO: 2.2 K/UL (ref 1.8–7.7)
NEUTROPHILS NFR BLD: 62.2 % (ref 38–73)
NRBC BLD-RTO: 0 /100 WBC
NUM UNITS TRANS PACKED RBC: NORMAL
NUM UNITS TRANS PACKED RBC: NORMAL
PLATELET # BLD AUTO: 75 K/UL (ref 150–450)
PMV BLD AUTO: 9.9 FL (ref 9.2–12.9)
POTASSIUM SERPL-SCNC: 4.1 MMOL/L (ref 3.5–5.1)
PROT SERPL-MCNC: 6.3 G/DL (ref 6–8.4)
RBC # BLD AUTO: 2.58 M/UL (ref 4–5.4)
SODIUM SERPL-SCNC: 137 MMOL/L (ref 136–145)
WBC # BLD AUTO: 3.55 K/UL (ref 3.9–12.7)

## 2023-01-06 PROCEDURE — 82962 GLUCOSE BLOOD TEST: CPT

## 2023-01-06 PROCEDURE — 90935 HEMODIALYSIS ONE EVALUATION: CPT

## 2023-01-06 PROCEDURE — G0378 HOSPITAL OBSERVATION PER HR: HCPCS

## 2023-01-06 PROCEDURE — 96365 THER/PROPH/DIAG IV INF INIT: CPT | Mod: 59

## 2023-01-06 PROCEDURE — 25000003 PHARM REV CODE 250: Performed by: INTERNAL MEDICINE

## 2023-01-06 PROCEDURE — P9016 RBC LEUKOCYTES REDUCED: HCPCS | Performed by: STUDENT IN AN ORGANIZED HEALTH CARE EDUCATION/TRAINING PROGRAM

## 2023-01-06 PROCEDURE — 25000003 PHARM REV CODE 250: Performed by: STUDENT IN AN ORGANIZED HEALTH CARE EDUCATION/TRAINING PROGRAM

## 2023-01-06 PROCEDURE — A4216 STERILE WATER/SALINE, 10 ML: HCPCS | Performed by: STUDENT IN AN ORGANIZED HEALTH CARE EDUCATION/TRAINING PROGRAM

## 2023-01-06 PROCEDURE — 86900 BLOOD TYPING SEROLOGIC ABO: CPT | Performed by: INTERNAL MEDICINE

## 2023-01-06 PROCEDURE — 96372 THER/PROPH/DIAG INJ SC/IM: CPT | Mod: 59 | Performed by: STUDENT IN AN ORGANIZED HEALTH CARE EDUCATION/TRAINING PROGRAM

## 2023-01-06 PROCEDURE — 63600175 PHARM REV CODE 636 W HCPCS: Performed by: STUDENT IN AN ORGANIZED HEALTH CARE EDUCATION/TRAINING PROGRAM

## 2023-01-06 PROCEDURE — 86920 COMPATIBILITY TEST SPIN: CPT | Performed by: STUDENT IN AN ORGANIZED HEALTH CARE EDUCATION/TRAINING PROGRAM

## 2023-01-06 PROCEDURE — 80053 COMPREHEN METABOLIC PANEL: CPT | Performed by: STUDENT IN AN ORGANIZED HEALTH CARE EDUCATION/TRAINING PROGRAM

## 2023-01-06 PROCEDURE — 96376 TX/PRO/DX INJ SAME DRUG ADON: CPT

## 2023-01-06 PROCEDURE — 36430 TRANSFUSION BLD/BLD COMPNT: CPT

## 2023-01-06 PROCEDURE — 63600175 PHARM REV CODE 636 W HCPCS: Performed by: INTERNAL MEDICINE

## 2023-01-06 PROCEDURE — 85025 COMPLETE CBC W/AUTO DIFF WBC: CPT | Performed by: STUDENT IN AN ORGANIZED HEALTH CARE EDUCATION/TRAINING PROGRAM

## 2023-01-06 RX ORDER — HYDROCODONE BITARTRATE AND ACETAMINOPHEN 500; 5 MG/1; MG/1
TABLET ORAL
Status: DISCONTINUED | OUTPATIENT
Start: 2023-01-06 | End: 2023-01-07 | Stop reason: HOSPADM

## 2023-01-06 RX ADMIN — AMLODIPINE BESYLATE 10 MG: 5 TABLET ORAL at 11:01

## 2023-01-06 RX ADMIN — DICYCLOMINE HYDROCHLORIDE 10 MG: 10 CAPSULE ORAL at 11:01

## 2023-01-06 RX ADMIN — CARVEDILOL 25 MG: 25 TABLET, FILM COATED ORAL at 11:01

## 2023-01-06 RX ADMIN — INSULIN ASPART 2 UNITS: 100 INJECTION, SOLUTION INTRAVENOUS; SUBCUTANEOUS at 11:01

## 2023-01-06 RX ADMIN — HYDRALAZINE HYDROCHLORIDE 100 MG: 25 TABLET ORAL at 05:01

## 2023-01-06 RX ADMIN — FLUOXETINE 20 MG: 20 CAPSULE ORAL at 11:01

## 2023-01-06 RX ADMIN — HYDROCODONE BITARTRATE AND ACETAMINOPHEN 1 TABLET: 7.5; 325 TABLET ORAL at 11:01

## 2023-01-06 RX ADMIN — PROMETHAZINE HYDROCHLORIDE 12.5 MG: 25 INJECTION INTRAMUSCULAR; INTRAVENOUS at 09:01

## 2023-01-06 RX ADMIN — ISOSORBIDE MONONITRATE 120 MG: 30 TABLET, EXTENDED RELEASE ORAL at 11:01

## 2023-01-06 RX ADMIN — HYDROCODONE BITARTRATE AND ACETAMINOPHEN 1 TABLET: 7.5; 325 TABLET ORAL at 09:01

## 2023-01-06 RX ADMIN — SENNOSIDES AND DOCUSATE SODIUM 1 TABLET: 50; 8.6 TABLET ORAL at 11:01

## 2023-01-06 RX ADMIN — POLYETHYLENE GLYCOL 3350 17 G: 17 POWDER, FOR SOLUTION ORAL at 11:01

## 2023-01-06 RX ADMIN — HYDROMORPHONE HYDROCHLORIDE 1 MG: 1 INJECTION, SOLUTION INTRAMUSCULAR; INTRAVENOUS; SUBCUTANEOUS at 11:01

## 2023-01-06 RX ADMIN — CLONIDINE 1 PATCH: 0.2 PATCH TRANSDERMAL at 11:01

## 2023-01-06 RX ADMIN — GABAPENTIN 100 MG: 100 CAPSULE ORAL at 11:01

## 2023-01-06 RX ADMIN — SODIUM CHLORIDE, PRESERVATIVE FREE 10 ML: 5 INJECTION INTRAVENOUS at 09:01

## 2023-01-06 RX ADMIN — HYDROMORPHONE HYDROCHLORIDE 1 MG: 1 INJECTION, SOLUTION INTRAMUSCULAR; INTRAVENOUS; SUBCUTANEOUS at 05:01

## 2023-01-06 RX ADMIN — HYDROCODONE BITARTRATE AND ACETAMINOPHEN 1 TABLET: 7.5; 325 TABLET ORAL at 01:01

## 2023-01-06 RX ADMIN — LEVETIRACETAM 500 MG: 500 TABLET, FILM COATED ORAL at 11:01

## 2023-01-06 RX ADMIN — PANTOPRAZOLE SODIUM 40 MG: 40 TABLET, DELAYED RELEASE ORAL at 05:01

## 2023-01-06 RX ADMIN — DICYCLOMINE HYDROCHLORIDE 10 MG: 10 CAPSULE ORAL at 05:01

## 2023-01-06 RX ADMIN — HYDRALAZINE HYDROCHLORIDE 100 MG: 25 TABLET ORAL at 01:01

## 2023-01-06 NOTE — PROGRESS NOTES
Automatic Inhaler to Nebulizer Interchange    albuterol (Ventolin, ProAir, or Proventil)  mcg given multiple times per day changed to albuterol 2.5 mg q6h prn wheezing  per [facility] Automatic Therapeutic Substitutions Protocol.    Please contact pharmacy at extension [phone] with any questions.     Thank you,   Noreen Yu

## 2023-01-06 NOTE — PROGRESS NOTES
Net UF 2.7 liters  2 units PRBC administered       01/06/23 1640   Handoff Report   Received From Stephanie Mena   Given To Leila   Vital Signs   Temp 97.6 °F (36.4 °C)   Temp src Axillary   Pulse 80   Resp 18   SpO2 99 %   BP (!) 175/88   Assessments (Pre/Post)   Blood Liters Processed (BLP) 57   Transport Modality not applicable   Level of Consciousness (AVPU) alert   Dialyzer Clearance heavily streaked   Post-Hemodialysis Assessment   Rinseback Volume (mL) 250 mL   Blood Volume Processed (Liters) 57 L   Dialyzer Clearance Heavily streaked   Duration of Treatment 180 minutes   Additional Fluid Intake (mL) 500 mL   Total UF (mL) 3800 mL   Net Fluid Removal 2700   Patient Response to Treatment tolerated   Post-Hemodialysis Comments Tx complete

## 2023-01-06 NOTE — NURSING
Report received from ER RN, care assumed, patient on stretcher resting with eyes closed, arouses to tactile stimulation, is AAOX4, follows commands and responds appropriately, O2 @1L per nasal cannula in place, resp even and unlabored, bedside monitor in place, safety precautions in place, nad noted, see flowsheet for assessment details.

## 2023-01-06 NOTE — CONSULTS
INPATIENT NEPHROLOGY CONSULT   [facility]    Patient Name: Tabby Howard  Date: 01/06/2023    Reason for consultation: ESRD    Chief Complaint:   Chief Complaint   Patient presents with    Abdominal Pain    N/V       History of Present Illness:  33-year-old female presents with nausea, vomiting, and abdominal pain- similar to prior presentations- acute onset.  History CHF, seizure disorder, ESRD (TTS dialysis), hypertension, DMI and gastroparesis. There are no exac/reliev factors. Consulted for dialysis.     Interval History:  1/6- admit /114, CT AP with chronic stable pericardial effusion    Plan of Care:    Assessment:  ESRD on HD TTS  Hypertensive emergency/D CHF  SHPT  Anemia of CKD  Pericardial effusion  Abdominal pain/Gastroparesis    Plan:    - ordered HD yest per routine schedule- got off 3L  - Hb low today- will do another HD treatment with UF and administer 2u of blood  - resume home BP meds- renal diet- 1.5L fluid restriction  - check phos  - hold SHELLEY while so hypertensive  - pericardial effusion is likely due to ESRD- history of missed HD, volume overload- monitor for signs/symptoms of tamponade  - abd pain management per hospitalist- imaging neg for acute pathology- will UF as able to reduce bowel edema    Thank you for allowing us to participate in this patient's care. We will continue to follow.    Vital Signs:  Temp Readings from Last 3 Encounters:   01/06/23 98 °F (36.7 °C) (Oral)   12/24/22 98.8 °F (37.1 °C)   12/23/22 98.4 °F (36.9 °C)       Pulse Readings from Last 3 Encounters:   01/06/23 82   12/24/22 70   12/23/22 89       BP Readings from Last 3 Encounters:   01/06/23 (!) 164/112   12/24/22 100/70   12/23/22 (!) 162/84       Weight:  Wt Readings from Last 3 Encounters:   01/06/23 59.9 kg (132 lb 0.9 oz)   12/23/22 59.9 kg (132 lb 0.9 oz)   12/23/22 59.9 kg (132 lb)       Past Medical & Surgical History:  Past Medical History:   Diagnosis Date    CKD (chronic kidney  disease), stage IV 2022    Diabetes mellitus due to underlying condition with unspecified complications 2022    Gastroparesis 2022    Heart failure with preserved ejection fraction 2022    EF 55% on 3/22    History of gastroesophageal reflux (GERD)     History of supraventricular tachycardia     Hyperkalemia 2022    Hypertensive emergency 2022    Sickle cell trait 2022    Type 2 diabetes mellitus        Past Surgical History:   Procedure Laterality Date     SECTION      x 3    COLONOSCOPY      COLONOSCOPY N/A 2022    Procedure: COLONOSCOPY;  Surgeon: Jagdeep Cedeno MD;  Location: [facility];  Service: Endoscopy;  Laterality: N/A;    ESOPHAGOGASTRODUODENOSCOPY N/A 10/18/2019    Procedure: ESOPHAGOGASTRODUODENOSCOPY (EGD);  Surgeon: Gianluca Mendez MD;  Location: [facility];  Service: Endoscopy;  Laterality: N/A;    ESOPHAGOGASTRODUODENOSCOPY N/A 2022    Procedure: EGD (ESOPHAGOGASTRODUODENOSCOPY);  Surgeon: Micky Paredes III, MD;  Location: [facility];  Service: Endoscopy;  Laterality: N/A;    ESOPHAGOGASTRODUODENOSCOPY N/A 2022    Procedure: EGD (ESOPHAGOGASTRODUODENOSCOPY);  Surgeon: Marcelo Zhong MD;  Location: [facility];  Service: Endoscopy;  Laterality: N/A;    LAPAROSCOPIC CHOLECYSTECTOMY N/A 2022    Procedure: CHOLECYSTECTOMY, LAPAROSCOPIC;  Surgeon: Grey Perez MD;  Location: [facility];  Service: General;  Laterality: N/A;    PLACEMENT OF DUAL-LUMEN VASCULAR CATHETER Left 2022    Procedure: INSERTION-CATHETER-JOSEPH;  Surgeon: Dionte Gan MD;  Location: [facility];  Service: General;  Laterality: Left;    PLACEMENT OF DUAL-LUMEN VASCULAR CATHETER Right 2022    Procedure: INSERTION-CATHETER-Hemosplit;  Surgeon: Dionte Gan MD;  Location: [facility];  Service: General;  Laterality: Right;       Past Social History:  Social History     Socioeconomic History    Marital status:    Tobacco Use    Smoking status: Never     Smokeless tobacco: Never   Substance and Sexual Activity    Alcohol use: Not Currently    Drug use: No    Sexual activity: Not Currently     Partners: Male     Birth control/protection: I.U.D.     Social Determinants of Health     Financial Resource Strain: Unknown    Difficulty of Paying Living Expenses: Patient refused   Food Insecurity: Unknown    Worried About Running Out of Food in the Last Year: Patient refused    Ran Out of Food in the Last Year: Patient refused   Transportation Needs: Unmet Transportation Needs    Lack of Transportation (Medical): Yes    Lack of Transportation (Non-Medical): Yes   Physical Activity: Inactive    Days of Exercise per Week: 0 days    Minutes of Exercise per Session: 0 min   Stress: Unknown    Feeling of Stress : Patient refused   Social Connections: Unknown    Frequency of Communication with Friends and Family: More than three times a week    Frequency of Social Gatherings with Friends and Family: More than three times a week    Attends [employer] Services: Patient refused    Active Member of Clubs or Organizations: Patient refused    Attends Club or Organization Meetings: Patient refused    Marital Status: Patient refused   Housing Stability: High Risk    Unable to Pay for Housing in the Last Year: Patient refused    Unstable Housing in the Last Year: Yes       Medications:  Scheduled Meds:   amLODIPine  10 mg Oral Daily    carvediloL  25 mg Oral BID    cloNIDine 0.2 mg/24 hr td ptwk  1 patch Transdermal Q7 Days    dicyclomine  10 mg Oral TID    FLUoxetine  20 mg Oral Daily    gabapentin  100 mg Oral BID    hydrALAZINE  100 mg Oral Q8H    isosorbide mononitrate  120 mg Oral Daily    levETIRAcetam  500 mg Oral BID    mirtazapine  15 mg Oral QHS    pantoprazole  40 mg Oral Before breakfast    polyethylene glycol  17 g Oral Daily    senna-docusate 8.6-50 mg  1 tablet Oral BID    sodium chloride 0.9%  10 mL Intravenous Q12H     Continuous Infusions:  PRN Meds:.acetaminophen,  albuterol sulfate, dextrose 10%, dextrose 10%, glucagon (human recombinant), glucose, glucose, HYDROcodone-acetaminophen, HYDROmorphone, insulin aspart U-100, melatonin  No current facility-administered medications on file prior to encounter.     Current Outpatient Medications on File Prior to Encounter   Medication Sig Dispense Refill    albuterol (PROVENTIL/VENTOLIN HFA) 90 mcg/actuation inhaler Inhale 2 puffs into the lungs every 6 (six) hours as needed for Wheezing. Rescue 18 g 3    amLODIPine (NORVASC) 10 MG tablet Take 1 tablet (10 mg total) by mouth once daily. 30 tablet 11    apixaban (ELIQUIS) 5 mg Tab Take 1 tablet (5 mg total) by mouth 2 (two) times daily. 60 tablet 2    carvediloL (COREG) 25 MG tablet Take 1 tablet (25 mg total) by mouth 2 (two) times daily. 60 tablet 2    cloNIDine 0.2 mg/24 hr td ptwk (CATAPRES) 0.2 mg/24 hr Place 1 patch onto the skin every 7 days. 4 patch 2    dicyclomine (BENTYL) 10 MG capsule Take 1 capsule (10 mg total) by mouth 3 (three) times daily. 90 capsule 0    ferrous sulfate 325 (65 FE) MG EC tablet Take 325 mg by mouth once daily.      FLUoxetine 20 MG capsule Take 1 capsule (20 mg total) by mouth once daily. 30 capsule 11    furosemide (LASIX) 40 MG tablet Take 40 mg by mouth 2 (two) times a day.      gabapentin (NEURONTIN) 100 MG capsule Take 1 capsule (100 mg total) by mouth 2 (two) times daily. 90 capsule 2    hydrALAZINE (APRESOLINE) 100 MG tablet Take 1 tablet (100 mg total) by mouth every 8 (eight) hours. 90 tablet 2    isosorbide mononitrate (IMDUR) 60 MG 24 hr tablet Take 2 tablets (120 mg total) by mouth once daily. 30 tablet 11    levETIRAcetam (KEPPRA) 500 MG Tab Take 1 tablet (500 mg total) by mouth 2 (two) times daily. 60 tablet 11    mirtazapine (REMERON SOL-TAB) 15 MG disintegrating tablet Take 1 tablet (15 mg total) by mouth nightly. 90 tablet 0    ondansetron (ZOFRAN) 4 MG tablet Take 1 tablet (4 mg total) by mouth every 8 (eight) hours as needed for  "Nausea. 60 tablet 2    pantoprazole (PROTONIX) 40 MG tablet Take 40 mg by mouth once daily.      promethazine (PHENERGAN) 25 MG tablet Take 1 tablet (25 mg total) by mouth every 12 (twelve) hours as needed (nausea/vomiting not relieved with ondansetron (zofran)). 60 tablet 0    traMADoL (ULTRAM) 50 mg tablet Take 1 tablet (50 mg total) by mouth every 8 (eight) hours as needed for Pain. 30 tablet 0    fluconazole (DIFLUCAN) 150 MG Tab 1 tablet Orally once for 1 day(s)      insulin aspart U-100 (NOVOLOG) 100 unit/mL (3 mL) InPn pen Inject 1-10 Units into the skin before meals and at bedtime as needed (Hyperglycemia). Max daily dose 40 units. 3 mL 6    insulin detemir U-100 (LEVEMIR FLEXTOUCH) 100 unit/mL (3 mL) SubQ InPn pen Inject 9 Units into the skin once daily. 6 mL 2    insulin glargine 100 units/mL SubQ pen 28u Subcutaneous once a day for 30 days      insulin lispro 100 unit/mL pen 8u Subcutaneous TID with meals for 90 days      losartan-hydrochlorothiazide 100-12.5 mg (HYZAAR) 100-12.5 mg Tab 1 tablet Orally Once a day for 30 day(s)      pen needle, diabetic (BD ULTRA-FINE MAGALIE PEN NEEDLE) 32 gauge x 5/32" Ndle Inject into the skin 5 times per day with insulin 100 each 12    [DISCONTINUED] atenoloL (TENORMIN) 50 MG tablet Take 1 tablet (50 mg total) by mouth every other day. 45 tablet 3    [DISCONTINUED] omeprazole (PRILOSEC) 20 MG capsule Take 2 capsules (40 mg total) by mouth once daily. for 10 days 20 capsule 0    [DISCONTINUED] torsemide (DEMADEX) 20 MG Tab Take 1 tablet (20 mg total) by mouth 2 (two) times a day. (Patient taking differently: Take 20 mg by mouth once daily.) 60 tablet 1       Allergies:  Penicillins    Past Family History:  Reviewed; refer to Hospitalist Admission Note    Review of Systems:  Off the floor    Physical Exam:  Off the floor    Results:  Lab Results   Component Value Date     01/06/2023    K 4.1 01/06/2023     01/06/2023    CO2 27 01/06/2023    BUN 19 01/06/2023    " CREATININE 3.1 (H) 01/06/2023    CALCIUM 8.5 (L) 01/06/2023    ANIONGAP 6 (L) 01/06/2023    ESTGFRAFRICA 20 (A) 07/31/2022    EGFRNONAA 18 (A) 07/31/2022       Lab Results   Component Value Date    CALCIUM 8.5 (L) 01/06/2023    PHOS 3.2 12/14/2022       Recent Labs   Lab 01/06/23  0448   WBC 3.55*   RBC 2.58*   HGB 7.0*   HCT 21.6*   PLT 75*   MCV 84   MCH 27.1   MCHC 32.4       I have personally reviewed pertinent radiological imaging and reports.    I have spent > 70 minutes providing care for this patient for the above diagnoses. These services have included chart/data/imaging review, evaluation, exam, formulation of plan, , note preparation, and discussions with staff involved in this patient's care.    Prateek Clark MD MPH  [facility] [address] [address]  [phone] (p)  [phone] (f)

## 2023-01-06 NOTE — PLAN OF CARE
Problem: Device-Related Complication Risk (Hemodialysis)  Goal: Safe, Effective Therapy Delivery  Outcome: Ongoing, Progressing     Problem: Hemodynamic Instability (Hemodialysis)  Goal: Effective Tissue Perfusion  Outcome: Ongoing, Progressing     Problem: Infection (Hemodialysis)  Goal: Absence of Infection Signs and Symptoms  Outcome: Ongoing, Progressing     Problem: Adult Inpatient Plan of Care  Goal: Plan of Care Review  Outcome: Ongoing, Progressing  Goal: Patient-Specific Goal (Individualized)  Outcome: Ongoing, Progressing  Goal: Absence of Hospital-Acquired Illness or Injury  Outcome: Ongoing, Progressing  Goal: Optimal Comfort and Wellbeing  Outcome: Ongoing, Progressing  Goal: Readiness for Transition of Care  Outcome: Ongoing, Progressing

## 2023-01-06 NOTE — PROGRESS NOTES
01/05/23 1900   Handoff Report   Received From Stephanie Mena   Given To Comfort   Vital Signs   Temp 97.7 °F (36.5 °C)   Temp src Oral   Pulse 80   Resp 20   SpO2 96 %   Pulse Oximetry Type Intermittent   Flow (L/min) 2   Device (Oxygen Therapy) nasal cannula   BP (!) 170/103   Assessments (Pre/Post)   Blood Liters Processed (BLP) 54   Transport Modality bed   Level of Consciousness (AVPU) responds to voice   Dialyzer Clearance heavily streaked   Post-Hemodialysis Assessment   Rinseback Volume (mL) 250 mL   Blood Volume Processed (Liters) 54 L   Dialyzer Clearance Heavily streaked   Duration of Treatment 180 minutes   Additional Fluid Intake (mL) 500 mL   Total UF (mL) 4000 mL   Net Fluid Removal 3500   Patient Response to Treatment Tolerated well   Post-Hemodialysis Comments Tx complete, all blood reinfused per protocol

## 2023-01-06 NOTE — H&P
Critical access hospital Medicine History & Physical Examination   Patient Name: Tabby Howard  MRN: [id number]  Patient Class: OP- Observation   Admission Date: 2023 10:17 AM  Length of Stay: 0  Attending Physician: James Atwood MD  Primary Care Provider: [facility]  Face-to-Face encounter date: 2023  Code Status: Full Code  MPOA:  Chief Complaint: Abdominal Pain and N/V        HISTORY OF PRESENT ILLNESS:   Patient is a 32 yo female with history of ESRD T/ schedule who is here with abdominal pain.  On my exam she is drowsy and appears comfortable, when aroused she will only moan and doesn't answer questions.  Unable to get further history or ROS    REVIEW OF SYSTEMS:   ANTOINETTE  PAST MEDICAL HISTORY:     Past Medical History:   Diagnosis Date    CKD (chronic kidney disease), stage IV 2022    Diabetes mellitus due to underlying condition with unspecified complications 2022    Gastroparesis 2022    Heart failure with preserved ejection fraction 2022    EF 55% on 3/22    History of gastroesophageal reflux (GERD)     History of supraventricular tachycardia     Hyperkalemia 2022    Hypertensive emergency 2022    Sickle cell trait 2022    Type 2 diabetes mellitus        PAST SURGICAL HISTORY:     Past Surgical History:   Procedure Laterality Date     SECTION      x 3    COLONOSCOPY      COLONOSCOPY N/A 2022    Procedure: COLONOSCOPY;  Surgeon: Jagdeep Cedeno MD;  Location: [facility];  Service: Endoscopy;  Laterality: N/A;    ESOPHAGOGASTRODUODENOSCOPY N/A 10/18/2019    Procedure: ESOPHAGOGASTRODUODENOSCOPY (EGD);  Surgeon: Gianluca Mendez MD;  Location: [facility];  Service: Endoscopy;  Laterality: N/A;    ESOPHAGOGASTRODUODENOSCOPY N/A 2022    Procedure: EGD (ESOPHAGOGASTRODUODENOSCOPY);  Surgeon: Micky Paredes III, MD;  Location: [facility];  Service: Endoscopy;  Laterality: N/A;     ESOPHAGOGASTRODUODENOSCOPY N/A 12/5/2022    Procedure: EGD (ESOPHAGOGASTRODUODENOSCOPY);  Surgeon: Marcelo Zhong MD;  Location: [facility] ENDO;  Service: Endoscopy;  Laterality: N/A;    LAPAROSCOPIC CHOLECYSTECTOMY N/A 07/30/2022    Procedure: CHOLECYSTECTOMY, LAPAROSCOPIC;  Surgeon: Grey Perez MD;  Location: [facility] OR;  Service: General;  Laterality: N/A;    PLACEMENT OF DUAL-LUMEN VASCULAR CATHETER Left 07/12/2022    Procedure: INSERTION-CATHETER-JOSEPH;  Surgeon: Dionte Gan MD;  Location: [facility] OR;  Service: General;  Laterality: Left;    PLACEMENT OF DUAL-LUMEN VASCULAR CATHETER Right 07/26/2022    Procedure: INSERTION-CATHETER-Hemosplit;  Surgeon: Dionte Gan MD;  Location: [facility] OR;  Service: General;  Laterality: Right;       ALLERGIES:   Penicillins    FAMILY HISTORY:     Family History   Problem Relation Age of Onset    Diabetes Mother     Diabetes Father        SOCIAL HISTORY:     Social History     Tobacco Use    Smoking status: Never    Smokeless tobacco: Never   Substance Use Topics    Alcohol use: Not Currently        Social History     Substance and Sexual Activity   Sexual Activity Not Currently    Partners: Male    Birth control/protection: I.U.D.        HOME MEDICATIONS:     Prior to Admission medications    Medication Sig Start Date End Date Taking? Authorizing Provider   albuterol (PROVENTIL/VENTOLIN HFA) 90 mcg/actuation inhaler Inhale 2 puffs into the lungs every 6 (six) hours as needed for Wheezing. Rescue 11/15/22  Yes Светлана Fiore MD   amLODIPine (NORVASC) 10 MG tablet Take 1 tablet (10 mg total) by mouth once daily. 11/15/22 11/15/23 Yes Светлана Fiore MD   apixaban (ELIQUIS) 5 mg Tab Take 1 tablet (5 mg total) by mouth 2 (two) times daily. 11/15/22 2/13/23 Yes Светлана Fiore MD   carvediloL (COREG) 25 MG tablet Take 1 tablet (25 mg total) by mouth 2 (two) times daily. 11/15/22 2/13/23 Yes Светлана Fiore MD   cloNIDine 0.2 mg/24 hr td ptwk (CATAPRES) 0.2 mg/24 hr  Place 1 patch onto the skin every 7 days. 11/15/22 11/15/23 Yes Светлана Fiore MD   dicyclomine (BENTYL) 10 MG capsule Take 1 capsule (10 mg total) by mouth 3 (three) times daily. 12/14/22 1/13/23 Yes Yuli Rodríguez MD   ferrous sulfate 325 (65 FE) MG EC tablet Take 325 mg by mouth once daily. 3/15/22  Yes Historical Provider   FLUoxetine 20 MG capsule Take 1 capsule (20 mg total) by mouth once daily. 11/15/22 11/15/23 Yes Светлана Fiore MD   furosemide (LASIX) 40 MG tablet Take 40 mg by mouth 2 (two) times a day. 12/14/22  Yes Historical Provider   gabapentin (NEURONTIN) 100 MG capsule Take 1 capsule (100 mg total) by mouth 2 (two) times daily. 11/15/22 11/15/23 Yes Светлана Fiore MD   hydrALAZINE (APRESOLINE) 100 MG tablet Take 1 tablet (100 mg total) by mouth every 8 (eight) hours. 11/15/22 2/13/23 Yes Светлана Fiore MD   isosorbide mononitrate (IMDUR) 60 MG 24 hr tablet Take 2 tablets (120 mg total) by mouth once daily. 11/15/22 11/15/23 Yes Светлана Fiore MD   levETIRAcetam (KEPPRA) 500 MG Tab Take 1 tablet (500 mg total) by mouth 2 (two) times daily. 11/15/22 11/15/23 Yes Светлана Fiore MD   mirtazapine (REMERON SOL-TAB) 15 MG disintegrating tablet Take 1 tablet (15 mg total) by mouth nightly. 12/7/22  Yes Max Garcia MD   ondansetron (ZOFRAN) 4 MG tablet Take 1 tablet (4 mg total) by mouth every 8 (eight) hours as needed for Nausea. 11/15/22  Yes Светлана Fiore MD   pantoprazole (PROTONIX) 40 MG tablet Take 40 mg by mouth once daily. 11/8/22  Yes Historical Provider   promethazine (PHENERGAN) 25 MG tablet Take 1 tablet (25 mg total) by mouth every 12 (twelve) hours as needed (nausea/vomiting not relieved with ondansetron (zofran)). 12/14/22 1/18/23 Yes Yuli Rodríguez MD   traMADoL (ULTRAM) 50 mg tablet Take 1 tablet (50 mg total) by mouth every 8 (eight) hours as needed for Pain. 12/14/22 1/7/23 Yes Yuli Rodríguez MD   fluconazole (DIFLUCAN) 150 MG Tab 1 tablet Orally once for  "1 day(s)    Historical Provider   insulin aspart U-100 (NOVOLOG) 100 unit/mL (3 mL) InPn pen Inject 1-10 Units into the skin before meals and at bedtime as needed (Hyperglycemia). Max daily dose 40 units. 11/15/22   Светлана Fiore MD   insulin detemir U-100 (LEVEMIR FLEXTOUCH) 100 unit/mL (3 mL) SubQ InPn pen Inject 9 Units into the skin once daily. 12/7/22   Max Garcia MD   insulin glargine 100 units/mL SubQ pen 28u Subcutaneous once a day for 30 days    Historical Provider   insulin lispro 100 unit/mL pen 8u Subcutaneous TID with meals for 90 days    Historical Provider   losartan-hydrochlorothiazide 100-12.5 mg (HYZAAR) 100-12.5 mg Tab 1 tablet Orally Once a day for 30 day(s) 3/15/22   Historical Provider   pen needle, diabetic (BD ULTRA-FINE MAGALIE PEN NEEDLE) 32 gauge x 5/32" Ndle Inject into the skin 5 times per day with insulin 11/15/22   Светлана Fiore MD   atenoloL (TENORMIN) 50 MG tablet Take 1 tablet (50 mg total) by mouth every other day. 7/17/22 7/27/22  Keegan Cody MD   omeprazole (PRILOSEC) 20 MG capsule Take 2 capsules (40 mg total) by mouth once daily. for 10 days 8/9/22 8/26/22  Kaushik Patton MD   torsemide (DEMADEX) 20 MG Tab Take 1 tablet (20 mg total) by mouth 2 (two) times a day.  Patient taking differently: Take 20 mg by mouth once daily. 6/11/22 7/15/22  Gurmeet Barrera MD         PHYSICAL EXAM:   BP (!) 196/109   Pulse 90   Temp 97.7 °F (36.5 °C) (Oral)   Resp 17   Ht 5' 2" (1.575 m)   Wt 59.9 kg (132 lb)   LMP  (LMP Unknown) Comment: Does not menstrate, NO PERIOD SINCE 12/2021  SpO2 99%   BMI 24.14 kg/m²   Vitals Reviewed  General appearance: no distress, sleeping  Skin: No Rash.   Neuro: drowsy, moves all extremities  HENT: Atraumatic head. Moist mucous membranes of oral cavity.   Lungs: Clear to auscultation bilaterally. No wheezing present.   Heart: Regular rate and rhythm. S1 and S2 present with no murmurs/gallop/rub. No pedal edema. No JVD present. "   Abdomen: Soft, non-distended,   Extremities: No cyanosis, clubbing.    EMERGENCY DEPARTMENT LABS AND IMAGING:     Labs Reviewed   CBC W/ AUTO DIFFERENTIAL - Abnormal; Notable for the following components:       Result Value    WBC 3.88 (*)     RBC 2.69 (*)     Hemoglobin 7.3 (*)     Hematocrit 22.0 (*)     RDW 15.8 (*)     Platelets 73 (*)     Immature Granulocytes 0.8 (*)     Lymph # 0.6 (*)     Gran % 74.5 (*)     Lymph % 14.7 (*)     All other components within normal limits    Narrative:     For upper or mid abdominal pain.   COMPREHENSIVE METABOLIC PANEL - Abnormal; Notable for the following components:    Glucose 489 (*)     BUN 36 (*)     Creatinine 4.9 (*)     Total Bilirubin 1.5 (*)     Alkaline Phosphatase 358 (*)      (*)      (*)     eGFR 11.3 (*)     All other components within normal limits    Narrative:     For upper or mid abdominal pain.  Glucose critical result(s) repeated. Called and verbal readback   obtained from [facility], RN by MEAGAN 01/05/2023 11:38   POCT GLUCOSE - Abnormal; Notable for the following components:    POC Glucose 494 (*)     All other components within normal limits   ISTAT PROCEDURE - Abnormal; Notable for the following components:    POC HCO3 29.5 (*)     POC SATURATED O2 85 (*)     POC TCO2 31 (*)     All other components within normal limits   POCT GLUCOSE - Abnormal; Notable for the following components:    POC Glucose 352 (*)     All other components within normal limits   LIPASE    Narrative:     For upper or mid abdominal pain.   HCG, QUANTITATIVE   HCG, QUANTITATIVE   HEPATITIS PANEL, ACUTE   POCT CREATININE   POCT GLUCOSE MONITORING CONTINUOUS       CT Abdomen Pelvis  Without Contrast   Final Result          ASSESSMENT & PLAN:   Tabby Howard is a 33 y.o. female here with abdominal pain admitted for HD and hyperglycemia      Plan    ESRD  Abdominal pain  Hyperglycemia  Nephro consult and plan for HD   ABD CT no acute pathology to explain  abd pain  Dilaudid IV given in ER  Norco 7.5 mg ordered PRN  SSI with AC/HS accuchecks        Diet: renal ADA    DVT Prophylaxis: Encourage ambulation. OOB as tolerated.     Discharge Planning and Disposition:  Home independent      ________________________________________________________________________________    INPATIENT LIST OF MEDICATIONS     Current Facility-Administered Medications:     acetaminophen tablet 650 mg, 650 mg, Oral, Q8H PRN, Isai Grady MD    albuterol nebulizer solution 2.5 mg, 2.5 mg, Nebulization, Q6H PRN, Isai Grady MD    [START ON 1/6/2023] amLODIPine tablet 10 mg, 10 mg, Oral, Daily, Isai Grady MD    carvediloL tablet 25 mg, 25 mg, Oral, BID, Isai Grady MD    [START ON 1/6/2023] cloNIDine 0.2 mg/24 hr td ptwk 1 patch, 1 patch, Transdermal, Q7 Days, Isai Grady MD    dextrose 10% bolus 125 mL 125 mL, 12.5 g, Intravenous, PRN, Isai Grady MD    dextrose 10% bolus 250 mL 250 mL, 25 g, Intravenous, PRN, Isai Grady MD    dicyclomine capsule 10 mg, 10 mg, Oral, TID, Isai Grady MD    [START ON 1/6/2023] FLUoxetine capsule 20 mg, 20 mg, Oral, Daily, Isai Grady MD    gabapentin capsule 100 mg, 100 mg, Oral, BID, Isai Grady MD    glucagon (human recombinant) injection 1 mg, 1 mg, Intramuscular, PRN, Isai Grady MD    glucose chewable tablet 16 g, 16 g, Oral, PRN, Isai Grady MD    glucose chewable tablet 24 g, 24 g, Oral, PRN, Isai Grady MD    hydrALAZINE tablet 100 mg, 100 mg, Oral, Q8H, Isai Grady MD    HYDROcodone-acetaminophen 7.5-325 mg per tablet 1 tablet, 1 tablet, Oral, Q4H PRN, Roby Neves MD, 1 tablet at 01/05/23 2056    HYDROmorphone injection 1 mg, 1 mg, Intravenous, Q6H PRN, Isai Grady MD    insulin aspart U-100 pen 1-10 Units, 1-10 Units, Subcutaneous, QID (AC + HS) PRN, Isai SOLIS  MD Miguel A    [START ON 1/6/2023] isosorbide mononitrate 24 hr tablet 120 mg, 120 mg, Oral, Daily, Isai Grady MD    levETIRAcetam tablet 500 mg, 500 mg, Oral, BID, Isai Grady MD    melatonin tablet 6 mg, 6 mg, Oral, Nightly PRN, Isai Grady MD    mirtazapine tablet 15 mg, 15 mg, Oral, QHS, Isai Grady MD    [START ON 1/6/2023] pantoprazole EC tablet 40 mg, 40 mg, Oral, Before breakfast, Isai Grady MD    [START ON 1/6/2023] polyethylene glycol packet 17 g, 17 g, Oral, Daily, Isai Grady MD    senna-docusate 8.6-50 mg per tablet 1 tablet, 1 tablet, Oral, BID, Isai Grady MD    sodium chloride 0.9% flush 10 mL, 10 mL, Intravenous, Q12H, Isai Grady MD    Current Outpatient Medications:     albuterol (PROVENTIL/VENTOLIN HFA) 90 mcg/actuation inhaler, Inhale 2 puffs into the lungs every 6 (six) hours as needed for Wheezing. Rescue, Disp: 18 g, Rfl: 3    amLODIPine (NORVASC) 10 MG tablet, Take 1 tablet (10 mg total) by mouth once daily., Disp: 30 tablet, Rfl: 11    apixaban (ELIQUIS) 5 mg Tab, Take 1 tablet (5 mg total) by mouth 2 (two) times daily., Disp: 60 tablet, Rfl: 2    carvediloL (COREG) 25 MG tablet, Take 1 tablet (25 mg total) by mouth 2 (two) times daily., Disp: 60 tablet, Rfl: 2    cloNIDine 0.2 mg/24 hr td ptwk (CATAPRES) 0.2 mg/24 hr, Place 1 patch onto the skin every 7 days., Disp: 4 patch, Rfl: 2    dicyclomine (BENTYL) 10 MG capsule, Take 1 capsule (10 mg total) by mouth 3 (three) times daily., Disp: 90 capsule, Rfl: 0    ferrous sulfate 325 (65 FE) MG EC tablet, Take 325 mg by mouth once daily., Disp: , Rfl:     FLUoxetine 20 MG capsule, Take 1 capsule (20 mg total) by mouth once daily., Disp: 30 capsule, Rfl: 11    furosemide (LASIX) 40 MG tablet, Take 40 mg by mouth 2 (two) times a day., Disp: , Rfl:     gabapentin (NEURONTIN) 100 MG capsule, Take 1 capsule (100 mg total) by mouth 2 (two) times daily.,  "Disp: 90 capsule, Rfl: 2    hydrALAZINE (APRESOLINE) 100 MG tablet, Take 1 tablet (100 mg total) by mouth every 8 (eight) hours., Disp: 90 tablet, Rfl: 2    isosorbide mononitrate (IMDUR) 60 MG 24 hr tablet, Take 2 tablets (120 mg total) by mouth once daily., Disp: 30 tablet, Rfl: 11    levETIRAcetam (KEPPRA) 500 MG Tab, Take 1 tablet (500 mg total) by mouth 2 (two) times daily., Disp: 60 tablet, Rfl: 11    mirtazapine (REMERON SOL-TAB) 15 MG disintegrating tablet, Take 1 tablet (15 mg total) by mouth nightly., Disp: 90 tablet, Rfl: 0    ondansetron (ZOFRAN) 4 MG tablet, Take 1 tablet (4 mg total) by mouth every 8 (eight) hours as needed for Nausea., Disp: 60 tablet, Rfl: 2    pantoprazole (PROTONIX) 40 MG tablet, Take 40 mg by mouth once daily., Disp: , Rfl:     promethazine (PHENERGAN) 25 MG tablet, Take 1 tablet (25 mg total) by mouth every 12 (twelve) hours as needed (nausea/vomiting not relieved with ondansetron (zofran))., Disp: 60 tablet, Rfl: 0    traMADoL (ULTRAM) 50 mg tablet, Take 1 tablet (50 mg total) by mouth every 8 (eight) hours as needed for Pain., Disp: 30 tablet, Rfl: 0    fluconazole (DIFLUCAN) 150 MG Tab, 1 tablet Orally once for 1 day(s), Disp: , Rfl:     insulin aspart U-100 (NOVOLOG) 100 unit/mL (3 mL) InPn pen, Inject 1-10 Units into the skin before meals and at bedtime as needed (Hyperglycemia). Max daily dose 40 units., Disp: 3 mL, Rfl: 6    insulin detemir U-100 (LEVEMIR FLEXTOUCH) 100 unit/mL (3 mL) SubQ InPn pen, Inject 9 Units into the skin once daily., Disp: 6 mL, Rfl: 2    insulin glargine 100 units/mL SubQ pen, 28u Subcutaneous once a day for 30 days, Disp: , Rfl:     insulin lispro 100 unit/mL pen, 8u Subcutaneous TID with meals for 90 days, Disp: , Rfl:     losartan-hydrochlorothiazide 100-12.5 mg (HYZAAR) 100-12.5 mg Tab, 1 tablet Orally Once a day for 30 day(s), Disp: , Rfl:     pen needle, diabetic (BD ULTRA-FINE MAGALIE PEN NEEDLE) 32 gauge x 5/32" Ndle, Inject into the skin 5 " times per day with insulin, Disp: 100 each, Rfl: 12      Scheduled Meds:   [START ON 1/6/2023] amLODIPine  10 mg Oral Daily    carvediloL  25 mg Oral BID    [START ON 1/6/2023] cloNIDine 0.2 mg/24 hr td ptwk  1 patch Transdermal Q7 Days    dicyclomine  10 mg Oral TID    [START ON 1/6/2023] FLUoxetine  20 mg Oral Daily    gabapentin  100 mg Oral BID    hydrALAZINE  100 mg Oral Q8H    [START ON 1/6/2023] isosorbide mononitrate  120 mg Oral Daily    levETIRAcetam  500 mg Oral BID    mirtazapine  15 mg Oral QHS    [START ON 1/6/2023] pantoprazole  40 mg Oral Before breakfast    [START ON 1/6/2023] polyethylene glycol  17 g Oral Daily    senna-docusate 8.6-50 mg  1 tablet Oral BID    sodium chloride 0.9%  10 mL Intravenous Q12H     Continuous Infusions:  PRN Meds:.acetaminophen, albuterol sulfate, dextrose 10%, dextrose 10%, glucagon (human recombinant), glucose, glucose, HYDROcodone-acetaminophen, HYDROmorphone, insulin aspart U-100, melatonin      Isai Grady  [facility] Hospitalist  01/05/2023

## 2023-01-06 NOTE — PLAN OF CARE
Problem: Device-Related Complication Risk (Hemodialysis)  Goal: Safe, Effective Therapy Delivery  Outcome: Ongoing, Progressing     Problem: Hemodynamic Instability (Hemodialysis)  Goal: Effective Tissue Perfusion  Outcome: Ongoing, Progressing     Problem: Infection (Hemodialysis)  Goal: Absence of Infection Signs and Symptoms  Outcome: Ongoing, Progressing     Problem: Adult Inpatient Plan of Care  Goal: Plan of Care Review  Outcome: Ongoing, Progressing  Goal: Patient-Specific Goal (Individualized)  Outcome: Ongoing, Progressing  Goal: Absence of Hospital-Acquired Illness or Injury  Outcome: Ongoing, Progressing  Goal: Optimal Comfort and Wellbeing  Outcome: Ongoing, Progressing  Goal: Readiness for Transition of Care  Outcome: Ongoing, Progressing     Problem: Infection  Goal: Absence of Infection Signs and Symptoms  Outcome: Ongoing, Progressing     Problem: Diabetes Comorbidity  Goal: Blood Glucose Level Within Targeted Range  Outcome: Ongoing, Progressing

## 2023-01-07 VITALS
HEIGHT: 62 IN | RESPIRATION RATE: 19 BRPM | SYSTOLIC BLOOD PRESSURE: 144 MMHG | TEMPERATURE: 98 F | OXYGEN SATURATION: 100 % | HEART RATE: 82 BPM | WEIGHT: 107.13 LBS | BODY MASS INDEX: 19.71 KG/M2 | DIASTOLIC BLOOD PRESSURE: 92 MMHG

## 2023-01-07 LAB
ALBUMIN SERPL BCP-MCNC: 3.6 G/DL (ref 3.5–5.2)
ALP SERPL-CCNC: 342 U/L (ref 55–135)
ALT SERPL W/O P-5'-P-CCNC: 79 U/L (ref 10–44)
ANION GAP SERPL CALC-SCNC: 16 MMOL/L (ref 8–16)
AST SERPL-CCNC: 45 U/L (ref 10–40)
BASOPHILS # BLD AUTO: 0.03 K/UL (ref 0–0.2)
BASOPHILS NFR BLD: 0.6 % (ref 0–1.9)
BILIRUB SERPL-MCNC: 2.6 MG/DL (ref 0.1–1)
BUN SERPL-MCNC: 27 MG/DL (ref 6–20)
CALCIUM SERPL-MCNC: 9.6 MG/DL (ref 8.7–10.5)
CHLORIDE SERPL-SCNC: 102 MMOL/L (ref 95–110)
CO2 SERPL-SCNC: 18 MMOL/L (ref 23–29)
CREAT SERPL-MCNC: 3 MG/DL (ref 0.5–1.4)
DIFFERENTIAL METHOD: ABNORMAL
EOSINOPHIL # BLD AUTO: 0 K/UL (ref 0–0.5)
EOSINOPHIL NFR BLD: 0.2 % (ref 0–8)
ERYTHROCYTE [DISTWIDTH] IN BLOOD BY AUTOMATED COUNT: 15 % (ref 11.5–14.5)
EST. GFR  (NO RACE VARIABLE): 20.3 ML/MIN/1.73 M^2
GLUCOSE SERPL-MCNC: 146 MG/DL (ref 70–110)
GLUCOSE SERPL-MCNC: 168 MG/DL (ref 70–110)
GLUCOSE SERPL-MCNC: 326 MG/DL (ref 70–110)
GLUCOSE SERPL-MCNC: 334 MG/DL (ref 70–110)
HCT VFR BLD AUTO: 36.4 % (ref 37–48.5)
HGB BLD-MCNC: 12.2 G/DL (ref 12–16)
IMM GRANULOCYTES # BLD AUTO: 0.03 K/UL (ref 0–0.04)
IMM GRANULOCYTES NFR BLD AUTO: 0.6 % (ref 0–0.5)
LYMPHOCYTES # BLD AUTO: 0.5 K/UL (ref 1–4.8)
LYMPHOCYTES NFR BLD: 9.9 % (ref 18–48)
MCH RBC QN AUTO: 27.9 PG (ref 27–31)
MCHC RBC AUTO-ENTMCNC: 33.5 G/DL (ref 32–36)
MCV RBC AUTO: 83 FL (ref 82–98)
MONOCYTES # BLD AUTO: 0.1 K/UL (ref 0.3–1)
MONOCYTES NFR BLD: 2.4 % (ref 4–15)
NEUTROPHILS # BLD AUTO: 4.3 K/UL (ref 1.8–7.7)
NEUTROPHILS NFR BLD: 86.3 % (ref 38–73)
NRBC BLD-RTO: 0 /100 WBC
PLATELET # BLD AUTO: 79 K/UL (ref 150–450)
PMV BLD AUTO: 9.8 FL (ref 9.2–12.9)
POTASSIUM SERPL-SCNC: 4.1 MMOL/L (ref 3.5–5.1)
PROT SERPL-MCNC: 7.9 G/DL (ref 6–8.4)
RBC # BLD AUTO: 4.37 M/UL (ref 4–5.4)
SODIUM SERPL-SCNC: 136 MMOL/L (ref 136–145)
WBC # BLD AUTO: 4.96 K/UL (ref 3.9–12.7)

## 2023-01-07 PROCEDURE — G0378 HOSPITAL OBSERVATION PER HR: HCPCS

## 2023-01-07 PROCEDURE — A4216 STERILE WATER/SALINE, 10 ML: HCPCS | Performed by: STUDENT IN AN ORGANIZED HEALTH CARE EDUCATION/TRAINING PROGRAM

## 2023-01-07 PROCEDURE — 90935 HEMODIALYSIS ONE EVALUATION: CPT

## 2023-01-07 PROCEDURE — 80053 COMPREHEN METABOLIC PANEL: CPT | Performed by: STUDENT IN AN ORGANIZED HEALTH CARE EDUCATION/TRAINING PROGRAM

## 2023-01-07 PROCEDURE — 25000003 PHARM REV CODE 250: Performed by: INTERNAL MEDICINE

## 2023-01-07 PROCEDURE — 96376 TX/PRO/DX INJ SAME DRUG ADON: CPT | Mod: 59

## 2023-01-07 PROCEDURE — 85025 COMPLETE CBC W/AUTO DIFF WBC: CPT | Performed by: STUDENT IN AN ORGANIZED HEALTH CARE EDUCATION/TRAINING PROGRAM

## 2023-01-07 PROCEDURE — 96375 TX/PRO/DX INJ NEW DRUG ADDON: CPT | Mod: 59

## 2023-01-07 PROCEDURE — 36415 COLL VENOUS BLD VENIPUNCTURE: CPT | Performed by: STUDENT IN AN ORGANIZED HEALTH CARE EDUCATION/TRAINING PROGRAM

## 2023-01-07 PROCEDURE — 99900035 HC TECH TIME PER 15 MIN (STAT)

## 2023-01-07 PROCEDURE — 25000003 PHARM REV CODE 250: Performed by: STUDENT IN AN ORGANIZED HEALTH CARE EDUCATION/TRAINING PROGRAM

## 2023-01-07 PROCEDURE — 63600175 PHARM REV CODE 636 W HCPCS: Performed by: INTERNAL MEDICINE

## 2023-01-07 PROCEDURE — 63600175 PHARM REV CODE 636 W HCPCS: Performed by: STUDENT IN AN ORGANIZED HEALTH CARE EDUCATION/TRAINING PROGRAM

## 2023-01-07 PROCEDURE — 94761 N-INVAS EAR/PLS OXIMETRY MLT: CPT

## 2023-01-07 PROCEDURE — 99900031 HC PATIENT EDUCATION (STAT)

## 2023-01-07 RX ORDER — LORAZEPAM 2 MG/ML
1 INJECTION INTRAMUSCULAR EVERY 6 HOURS PRN
Status: DISCONTINUED | OUTPATIENT
Start: 2023-01-07 | End: 2023-01-07 | Stop reason: HOSPADM

## 2023-01-07 RX ADMIN — HYDROCODONE BITARTRATE AND ACETAMINOPHEN 1 TABLET: 7.5; 325 TABLET ORAL at 11:01

## 2023-01-07 RX ADMIN — PANTOPRAZOLE SODIUM 40 MG: 40 TABLET, DELAYED RELEASE ORAL at 07:01

## 2023-01-07 RX ADMIN — HYDRALAZINE HYDROCHLORIDE 100 MG: 25 TABLET ORAL at 04:01

## 2023-01-07 RX ADMIN — LORAZEPAM 1 MG: 2 INJECTION INTRAMUSCULAR; INTRAVENOUS at 01:01

## 2023-01-07 RX ADMIN — HYDRALAZINE HYDROCHLORIDE 100 MG: 25 TABLET ORAL at 07:01

## 2023-01-07 RX ADMIN — INSULIN ASPART 8 UNITS: 100 INJECTION, SOLUTION INTRAVENOUS; SUBCUTANEOUS at 07:01

## 2023-01-07 RX ADMIN — HYDROMORPHONE HYDROCHLORIDE 1 MG: 1 INJECTION, SOLUTION INTRAMUSCULAR; INTRAVENOUS; SUBCUTANEOUS at 05:01

## 2023-01-07 RX ADMIN — HYDROMORPHONE HYDROCHLORIDE 1 MG: 1 INJECTION, SOLUTION INTRAMUSCULAR; INTRAVENOUS; SUBCUTANEOUS at 04:01

## 2023-01-07 RX ADMIN — DICYCLOMINE HYDROCHLORIDE 10 MG: 10 CAPSULE ORAL at 11:01

## 2023-01-07 RX ADMIN — DICYCLOMINE HYDROCHLORIDE 10 MG: 10 CAPSULE ORAL at 04:01

## 2023-01-07 RX ADMIN — GABAPENTIN 100 MG: 100 CAPSULE ORAL at 11:01

## 2023-01-07 RX ADMIN — LEVETIRACETAM 500 MG: 500 TABLET, FILM COATED ORAL at 11:01

## 2023-01-07 RX ADMIN — PROMETHAZINE HYDROCHLORIDE 12.5 MG: 25 INJECTION INTRAMUSCULAR; INTRAVENOUS at 08:01

## 2023-01-07 RX ADMIN — SODIUM CHLORIDE, PRESERVATIVE FREE 10 ML: 5 INJECTION INTRAVENOUS at 09:01

## 2023-01-07 NOTE — CONSULTS
INPATIENT NEPHROLOGY CONSULT   [facility]    Patient Name: Tabby Howard  Date: 01/07/2023    Reason for consultation: ESRD    Chief Complaint:   Chief Complaint   Patient presents with    Abdominal Pain    N/V       History of Present Illness:  33-year-old female presents with nausea, vomiting, and abdominal pain- similar to prior presentations- acute onset.  History CHF, seizure disorder, ESRD (TTS dialysis), hypertension, DMI and gastroparesis. There are no exac/reliev factors. Consulted for dialysis.     Interval History:  1/6- admit /114, CT AP with chronic stable pericardial effusion  1/7  seen on dialysis.  C/o nausea.      Plan of Care:    Assessment:  ESRD on HD TTS  Hypertensive emergency/D CHF  SHPT  Anemia of CKD  Pericardial effusion  Abdominal pain/Gastroparesis    Plan:    - dialysis per routine   - s/p pRBC  - resume home BP meds- renal diet- 1.5L fluid restriction  - check phos  - hold SHELLEY while so hypertensive  - pericardial effusion is likely due to ESRD- history of missed HD, volume overload- monitor for signs/symptoms of tamponade  - abd pain management per hospitalist- imaging neg for acute pathology- will UF as able to reduce bowel edema    Thank you for allowing us to participate in this patient's care. We will continue to follow.    Vital Signs:  Temp Readings from Last 3 Encounters:   01/07/23 98 °F (36.7 °C) (Axillary)   12/24/22 98.8 °F (37.1 °C)   12/23/22 98.4 °F (36.9 °C)       Pulse Readings from Last 3 Encounters:   01/07/23 80   12/24/22 70   12/23/22 89       BP Readings from Last 3 Encounters:   01/07/23 (!) 164/107   12/24/22 100/70   12/23/22 (!) 162/84       Weight:  Wt Readings from Last 3 Encounters:   01/06/23 48.6 kg (107 lb 2.3 oz)   12/23/22 59.9 kg (132 lb 0.9 oz)   12/23/22 59.9 kg (132 lb)       Past Medical & Surgical History:  Past Medical History:   Diagnosis Date    CKD (chronic kidney disease), stage IV 4/12/2022    Diabetes mellitus due  to underlying condition with unspecified complications 2022    Gastroparesis 2022    Heart failure with preserved ejection fraction 2022    EF 55% on 3/22    History of gastroesophageal reflux (GERD)     History of supraventricular tachycardia     Hyperkalemia 2022    Hypertensive emergency 2022    Sickle cell trait 2022    Type 2 diabetes mellitus        Past Surgical History:   Procedure Laterality Date     SECTION      x 3    COLONOSCOPY      COLONOSCOPY N/A 2022    Procedure: COLONOSCOPY;  Surgeon: Jagdeep Cedeno MD;  Location: [facility];  Service: Endoscopy;  Laterality: N/A;    ESOPHAGOGASTRODUODENOSCOPY N/A 10/18/2019    Procedure: ESOPHAGOGASTRODUODENOSCOPY (EGD);  Surgeon: Gianluca Mendez MD;  Location: [facility];  Service: Endoscopy;  Laterality: N/A;    ESOPHAGOGASTRODUODENOSCOPY N/A 2022    Procedure: EGD (ESOPHAGOGASTRODUODENOSCOPY);  Surgeon: Micky Paredes III, MD;  Location: [facility];  Service: Endoscopy;  Laterality: N/A;    ESOPHAGOGASTRODUODENOSCOPY N/A 2022    Procedure: EGD (ESOPHAGOGASTRODUODENOSCOPY);  Surgeon: Marcelo Zhong MD;  Location: [facility];  Service: Endoscopy;  Laterality: N/A;    LAPAROSCOPIC CHOLECYSTECTOMY N/A 2022    Procedure: CHOLECYSTECTOMY, LAPAROSCOPIC;  Surgeon: Grey Perez MD;  Location: [facility];  Service: General;  Laterality: N/A;    PLACEMENT OF DUAL-LUMEN VASCULAR CATHETER Left 2022    Procedure: INSERTION-CATHETER-JOSEPH;  Surgeon: Dionte Gan MD;  Location: [facility] OR;  Service: General;  Laterality: Left;    PLACEMENT OF DUAL-LUMEN VASCULAR CATHETER Right 2022    Procedure: INSERTION-CATHETER-Hemosplit;  Surgeon: Dionte Gan MD;  Location: [facility] OR;  Service: General;  Laterality: Right;       Past Social History:  Social History     Socioeconomic History    Marital status:    Tobacco Use    Smoking status: Never    Smokeless tobacco: Never   Substance and Sexual  Activity    Alcohol use: Not Currently    Drug use: No    Sexual activity: Not Currently     Partners: Male     Birth control/protection: I.U.D.     Social Determinants of Health     Financial Resource Strain: Unknown    Difficulty of Paying Living Expenses: Patient refused   Food Insecurity: Unknown    Worried About Running Out of Food in the Last Year: Patient refused    Ran Out of Food in the Last Year: Patient refused   Transportation Needs: Unmet Transportation Needs    Lack of Transportation (Medical): Yes    Lack of Transportation (Non-Medical): Yes   Physical Activity: Inactive    Days of Exercise per Week: 0 days    Minutes of Exercise per Session: 0 min   Stress: Unknown    Feeling of Stress : Patient refused   Social Connections: Unknown    Frequency of Communication with Friends and Family: More than three times a week    Frequency of Social Gatherings with Friends and Family: More than three times a week    Attends Rastafari Services: Patient refused    Active Member of Clubs or Organizations: Patient refused    Attends Club or Organization Meetings: Patient refused    Marital Status: Patient refused   Housing Stability: High Risk    Unable to Pay for Housing in the Last Year: Patient refused    Unstable Housing in the Last Year: Yes       Medications:  Scheduled Meds:   amLODIPine  10 mg Oral Daily    carvediloL  25 mg Oral BID    cloNIDine 0.2 mg/24 hr td ptwk  1 patch Transdermal Q7 Days    dicyclomine  10 mg Oral TID    FLUoxetine  20 mg Oral Daily    gabapentin  100 mg Oral BID    hydrALAZINE  100 mg Oral Q8H    isosorbide mononitrate  120 mg Oral Daily    levETIRAcetam  500 mg Oral BID    mirtazapine  15 mg Oral QHS    pantoprazole  40 mg Oral Before breakfast    polyethylene glycol  17 g Oral Daily    senna-docusate 8.6-50 mg  1 tablet Oral BID    sodium chloride 0.9%  10 mL Intravenous Q12H     Continuous Infusions:  PRN Meds:.sodium chloride, acetaminophen, albuterol sulfate, dextrose 10%,  dextrose 10%, glucagon (human recombinant), glucose, glucose, HYDROcodone-acetaminophen, HYDROmorphone, insulin aspart U-100, lorazepam, melatonin, promethazine (PHENERGAN) IVPB  No current facility-administered medications on file prior to encounter.     Current Outpatient Medications on File Prior to Encounter   Medication Sig Dispense Refill    albuterol (PROVENTIL/VENTOLIN HFA) 90 mcg/actuation inhaler Inhale 2 puffs into the lungs every 6 (six) hours as needed for Wheezing. Rescue 18 g 3    amLODIPine (NORVASC) 10 MG tablet Take 1 tablet (10 mg total) by mouth once daily. 30 tablet 11    apixaban (ELIQUIS) 5 mg Tab Take 1 tablet (5 mg total) by mouth 2 (two) times daily. 60 tablet 2    carvediloL (COREG) 25 MG tablet Take 1 tablet (25 mg total) by mouth 2 (two) times daily. 60 tablet 2    cloNIDine 0.2 mg/24 hr td ptwk (CATAPRES) 0.2 mg/24 hr Place 1 patch onto the skin every 7 days. 4 patch 2    dicyclomine (BENTYL) 10 MG capsule Take 1 capsule (10 mg total) by mouth 3 (three) times daily. 90 capsule 0    ferrous sulfate 325 (65 FE) MG EC tablet Take 325 mg by mouth once daily.      FLUoxetine 20 MG capsule Take 1 capsule (20 mg total) by mouth once daily. 30 capsule 11    furosemide (LASIX) 40 MG tablet Take 40 mg by mouth 2 (two) times a day.      gabapentin (NEURONTIN) 100 MG capsule Take 1 capsule (100 mg total) by mouth 2 (two) times daily. 90 capsule 2    hydrALAZINE (APRESOLINE) 100 MG tablet Take 1 tablet (100 mg total) by mouth every 8 (eight) hours. 90 tablet 2    isosorbide mononitrate (IMDUR) 60 MG 24 hr tablet Take 2 tablets (120 mg total) by mouth once daily. 30 tablet 11    levETIRAcetam (KEPPRA) 500 MG Tab Take 1 tablet (500 mg total) by mouth 2 (two) times daily. 60 tablet 11    mirtazapine (REMERON SOL-TAB) 15 MG disintegrating tablet Take 1 tablet (15 mg total) by mouth nightly. 90 tablet 0    ondansetron (ZOFRAN) 4 MG tablet Take 1 tablet (4 mg total) by mouth every 8 (eight) hours as  "needed for Nausea. 60 tablet 2    pantoprazole (PROTONIX) 40 MG tablet Take 40 mg by mouth once daily.      promethazine (PHENERGAN) 25 MG tablet Take 1 tablet (25 mg total) by mouth every 12 (twelve) hours as needed (nausea/vomiting not relieved with ondansetron (zofran)). 60 tablet 0    traMADoL (ULTRAM) 50 mg tablet Take 1 tablet (50 mg total) by mouth every 8 (eight) hours as needed for Pain. 30 tablet 0    fluconazole (DIFLUCAN) 150 MG Tab 1 tablet Orally once for 1 day(s)      insulin aspart U-100 (NOVOLOG) 100 unit/mL (3 mL) InPn pen Inject 1-10 Units into the skin before meals and at bedtime as needed (Hyperglycemia). Max daily dose 40 units. 3 mL 6    insulin detemir U-100 (LEVEMIR FLEXTOUCH) 100 unit/mL (3 mL) SubQ InPn pen Inject 9 Units into the skin once daily. 6 mL 2    insulin glargine 100 units/mL SubQ pen 28u Subcutaneous once a day for 30 days      insulin lispro 100 unit/mL pen 8u Subcutaneous TID with meals for 90 days      losartan-hydrochlorothiazide 100-12.5 mg (HYZAAR) 100-12.5 mg Tab 1 tablet Orally Once a day for 30 day(s)      pen needle, diabetic (BD ULTRA-FINE MAGALIE PEN NEEDLE) 32 gauge x 5/32" Ndle Inject into the skin 5 times per day with insulin 100 each 12    [DISCONTINUED] atenoloL (TENORMIN) 50 MG tablet Take 1 tablet (50 mg total) by mouth every other day. 45 tablet 3    [DISCONTINUED] omeprazole (PRILOSEC) 20 MG capsule Take 2 capsules (40 mg total) by mouth once daily. for 10 days 20 capsule 0    [DISCONTINUED] torsemide (DEMADEX) 20 MG Tab Take 1 tablet (20 mg total) by mouth 2 (two) times a day. (Patient taking differently: Take 20 mg by mouth once daily.) 60 tablet 1       Allergies:  Penicillins    Past Family History:  Reviewed; refer to Hospitalist Admission Note    Review of Systems:  Off the floor    Physical Exam:  Off the floor    Results:  Lab Results   Component Value Date     01/07/2023    K 4.1 01/07/2023     01/07/2023    CO2 18 (L) 01/07/2023    BUN 27 " (H) 01/07/2023    CREATININE 3.0 (H) 01/07/2023    CALCIUM 9.6 01/07/2023    ANIONGAP 16 01/07/2023    ESTGFRAFRICA 20 (A) 07/31/2022    EGFRNONAA 18 (A) 07/31/2022       Lab Results   Component Value Date    CALCIUM 9.6 01/07/2023    PHOS 3.2 12/14/2022       Recent Labs   Lab 01/07/23  0823   WBC 4.96   RBC 4.37   HGB 12.2   HCT 36.4*   PLT 79*   MCV 83   MCH 27.9   MCHC 33.5         I have personally reviewed pertinent radiological imaging and reports.    Patient care was time spent personally by me on the following activities:   Obtaining a history  Examination of patient.  Providing medical care at the patients bedside.  Developing a treatment plan with patient or surrogate and bedside caregivers  Ordering and reviewing laboratory studies, radiographic studies, pulse oximetry.  Ordering and performing treatments and interventions.  Evaluation of patient's response to treatment.  Discussions with consultants while on the unit and immediately available to the patient.  Re-evaluation of the patient's condition.  Documentation in the medical record.     Hugh Figueroa MD  Nephrology  [facility] [place]  [phone]

## 2023-01-07 NOTE — NURSING
Pt complained on N/V; gave Phenergan per MD order; as Phenergan was infusing pt was sticking her fingers down her throat to make herself vomit.  Repeatedly asking for IV pain meds  Crying and thrashing around bed and tangling herself in the IV and linen    Unable to give PO meds  Will continue to monitor

## 2023-01-07 NOTE — PLAN OF CARE
Problem: Device-Related Complication Risk (Hemodialysis)  Goal: Safe, Effective Therapy Delivery  Outcome: Met     Problem: Hemodynamic Instability (Hemodialysis)  Goal: Effective Tissue Perfusion  Outcome: Met     Problem: Infection (Hemodialysis)  Goal: Absence of Infection Signs and Symptoms  Outcome: Met     Problem: Adult Inpatient Plan of Care  Goal: Plan of Care Review  Outcome: Met  Goal: Patient-Specific Goal (Individualized)  Outcome: Met  Goal: Absence of Hospital-Acquired Illness or Injury  Outcome: Met  Goal: Optimal Comfort and Wellbeing  Outcome: Met  Goal: Readiness for Transition of Care  Outcome: Met     Problem: Infection  Goal: Absence of Infection Signs and Symptoms  Outcome: Met     Problem: Diabetes Comorbidity  Goal: Blood Glucose Level Within Targeted Range  Outcome: Met

## 2023-01-08 LAB
HAV IGM SERPL QL IA: NEGATIVE
HBV CORE IGM SERPL QL IA: NEGATIVE
HBV SURFACE AG SERPL QL IA: NEGATIVE
HCV AB S/CO SERPL IA: 0.1 S/CO RATIO (ref 0–0.9)
HCV AB SERPL QL IA: NORMAL

## 2023-01-08 NOTE — NURSING
Pt discharged;A&Ox4  Pt's father Garcia and father's wife Tanya along with Pt's 3 children arrived to pick her up.    I discussed discharged instructions with Garcia and Tanya. Once the pt was in the vehicle she stick her fingers down her throat to make herself vomit. Garcia and Tanya informed me that they were aware that she does this. She also has the same behavior at home. They state that the patient is very noncompliant with her health and sneaks food in the middle of the night while everyone is sleeping. They expressed that they would like her to go to a facility to get 24 hour care also saying that they feel she is making herself sick.

## 2023-01-10 NOTE — PLAN OF CARE
Chart and discharge orders reviewed.  Patient discharged home on 1/7/2023 with no further case management needs.     01/09/23 9124   Final Note   Assessment Type Final Discharge Note   Anticipated Discharge Disposition Home   Post-Acute Status   Discharge Delays None known at this time

## 2023-01-12 ENCOUNTER — HOSPITAL ENCOUNTER (OUTPATIENT)
Facility: HOSPITAL | Age: 33
Discharge: HOME | End: 2023-01-13
Attending: EMERGENCY MEDICINE | Admitting: INTERNAL MEDICINE
Payer: MEDICAID

## 2023-01-12 ENCOUNTER — CLINICAL SUPPORT (OUTPATIENT)
Dept: CARDIOLOGY | Facility: HOSPITAL | Age: 33
End: 2023-01-12
Attending: EMERGENCY MEDICINE
Payer: MEDICAID

## 2023-01-12 VITALS — BODY MASS INDEX: 24.29 KG/M2 | HEIGHT: 62 IN | WEIGHT: 132 LBS

## 2023-01-12 DIAGNOSIS — I31.39 PERICARDIAL EFFUSION: ICD-10-CM

## 2023-01-12 DIAGNOSIS — I10 ACCELERATED HYPERTENSION: ICD-10-CM

## 2023-01-12 DIAGNOSIS — R10.9 ABDOMINAL PAIN: ICD-10-CM

## 2023-01-12 LAB
ALBUMIN SERPL BCP-MCNC: 3.5 G/DL (ref 3.5–5.2)
ALP SERPL-CCNC: 215 U/L (ref 55–135)
ALT SERPL W/O P-5'-P-CCNC: 31 U/L (ref 10–44)
ANION GAP SERPL CALC-SCNC: 11 MMOL/L (ref 8–16)
AORTIC ROOT ANNULUS: 3.3 CM
AORTIC VALVE CUSP SEPERATION: 2.1 CM
APTT BLDCRRT: 29.2 SEC (ref 21–32)
AST SERPL-CCNC: 23 U/L (ref 10–40)
AV INDEX (PROSTH): 0.88
AV MEAN GRADIENT: 4 MMHG
AV PEAK GRADIENT: 6 MMHG
AV VALVE AREA: 3.12 CM2
AV VELOCITY RATIO: 0.88
BASOPHILS # BLD AUTO: 0.02 K/UL (ref 0–0.2)
BASOPHILS NFR BLD: 0.2 % (ref 0–1.9)
BILIRUB SERPL-MCNC: 1.7 MG/DL (ref 0.1–1)
BSA FOR ECHO PROCEDURE: 1.62 M2
BUN SERPL-MCNC: 42 MG/DL (ref 6–20)
CALCIUM SERPL-MCNC: 8.6 MG/DL (ref 8.7–10.5)
CHLORIDE SERPL-SCNC: 92 MMOL/L (ref 95–110)
CO2 SERPL-SCNC: 27 MMOL/L (ref 23–29)
CREAT SERPL-MCNC: 4.7 MG/DL (ref 0.5–1.4)
CV ECHO LV RWT: 0.55 CM
DIFFERENTIAL METHOD: ABNORMAL
DOP CALC AO PEAK VEL: 1.26 M/S
DOP CALC AO VTI: 21.6 CM
DOP CALC LVOT AREA: 3.6 CM2
DOP CALC LVOT DIAMETER: 2.13 CM
DOP CALC LVOT PEAK VEL: 1.11 M/S
DOP CALC LVOT STROKE VOLUME: 67.31 CM3
DOP CALCLVOT PEAK VEL VTI: 18.9 CM
E WAVE DECELERATION TIME: 143 MSEC
E/A RATIO: 4.45
E/E' RATIO: 21 M/S
ECHO LV POSTERIOR WALL: 1.36 CM (ref 0.6–1.1)
EJECTION FRACTION: 55 %
EOSINOPHIL # BLD AUTO: 0 K/UL (ref 0–0.5)
EOSINOPHIL NFR BLD: 0.4 % (ref 0–8)
ERYTHROCYTE [DISTWIDTH] IN BLOOD BY AUTOMATED COUNT: 14.6 % (ref 11.5–14.5)
EST. GFR  (NO RACE VARIABLE): 11.8 ML/MIN/1.73 M^2
FRACTIONAL SHORTENING: 28 % (ref 28–44)
GLUCOSE SERPL-MCNC: 118 MG/DL (ref 70–110)
GLUCOSE SERPL-MCNC: 134 MG/DL (ref 70–110)
GLUCOSE SERPL-MCNC: 158 MG/DL (ref 70–110)
GLUCOSE SERPL-MCNC: 176 MG/DL (ref 70–110)
GLUCOSE SERPL-MCNC: 241 MG/DL (ref 70–110)
GLUCOSE SERPL-MCNC: 326 MG/DL (ref 70–110)
GLUCOSE SERPL-MCNC: 437 MG/DL (ref 70–110)
GLUCOSE SERPL-MCNC: 555 MG/DL (ref 70–110)
GLUCOSE SERPL-MCNC: 674 MG/DL (ref 70–110)
HCG INTACT+B SERPL-ACNC: 3.8 MIU/ML
HCT VFR BLD AUTO: 24.7 % (ref 37–48.5)
HGB BLD-MCNC: 8.5 G/DL (ref 12–16)
IMM GRANULOCYTES # BLD AUTO: 0.02 K/UL (ref 0–0.04)
IMM GRANULOCYTES NFR BLD AUTO: 0.2 % (ref 0–0.5)
INR PPP: 1 (ref 0.8–1.2)
INTERVENTRICULAR SEPTUM: 1.5 CM (ref 0.6–1.1)
LEFT ATRIUM SIZE: 4.51 CM
LEFT ATRIUM VOLUME INDEX MOD: 47.2 ML/M2
LEFT ATRIUM VOLUME MOD: 75.51 CM3
LEFT INTERNAL DIMENSION IN SYSTOLE: 3.54 CM (ref 2.1–4)
LEFT VENTRICLE DIASTOLIC VOLUME INDEX: 71.58 ML/M2
LEFT VENTRICLE DIASTOLIC VOLUME: 114.53 ML
LEFT VENTRICLE MASS INDEX: 184 G/M2
LEFT VENTRICLE SYSTOLIC VOLUME INDEX: 32.6 ML/M2
LEFT VENTRICLE SYSTOLIC VOLUME: 52.22 ML
LEFT VENTRICULAR INTERNAL DIMENSION IN DIASTOLE: 4.93 CM (ref 3.5–6)
LEFT VENTRICULAR MASS: 294.22 G
LIPASE SERPL-CCNC: 42 U/L (ref 4–60)
LV LATERAL E/E' RATIO: 14.7 M/S
LV SEPTAL E/E' RATIO: 36.75 M/S
LVOT MG: 2.37 MMHG
LVOT MV: 0.7 CM/S
LYMPHOCYTES # BLD AUTO: 0.5 K/UL (ref 1–4.8)
LYMPHOCYTES NFR BLD: 5.7 % (ref 18–48)
MAGNESIUM SERPL-MCNC: 1.9 MG/DL (ref 1.6–2.6)
MCH RBC QN AUTO: 27.8 PG (ref 27–31)
MCHC RBC AUTO-ENTMCNC: 34.4 G/DL (ref 32–36)
MCV RBC AUTO: 81 FL (ref 82–98)
MONOCYTES # BLD AUTO: 0.6 K/UL (ref 0.3–1)
MONOCYTES NFR BLD: 7.2 % (ref 4–15)
MV PEAK A VEL: 0.33 M/S
MV PEAK E VEL: 1.47 M/S
MV STENOSIS PRESSURE HALF TIME: 41.47 MS
MV VALVE AREA P 1/2 METHOD: 5.31 CM2
NEUTROPHILS # BLD AUTO: 7.2 K/UL (ref 1.8–7.7)
NEUTROPHILS NFR BLD: 86.3 % (ref 38–73)
NRBC BLD-RTO: 0 /100 WBC
PISA TR MAX VEL: 3.63 M/S
PLATELET # BLD AUTO: 33 K/UL (ref 150–450)
PLATELET BLD QL SMEAR: ABNORMAL
PMV BLD AUTO: 10.9 FL (ref 9.2–12.9)
POTASSIUM SERPL-SCNC: 4.1 MMOL/L (ref 3.5–5.1)
PROT SERPL-MCNC: 7.1 G/DL (ref 6–8.4)
PROTHROMBIN TIME: 10.8 SEC (ref 9–12.5)
PV MV: 0.63 M/S
PV PEAK VELOCITY: 1.06 CM/S
RA PRESSURE: 3 MMHG
RA VOL SYS: 35.04 ML
RBC # BLD AUTO: 3.06 M/UL (ref 4–5.4)
SINUS: 2.88 CM
SODIUM SERPL-SCNC: 130 MMOL/L (ref 136–145)
STJ: 2.33 CM
TDI LATERAL: 0.1 M/S
TDI SEPTAL: 0.04 M/S
TDI: 0.07 M/S
TR MAX PG: 53 MMHG
TRICUSPID ANNULAR PLANE SYSTOLIC EXCURSION: 1.91 CM
TROPONIN I SERPL HS-MCNC: 88.6 PG/ML (ref 0–14.9)
TV REST PULMONARY ARTERY PRESSURE: 56 MMHG
WBC # BLD AUTO: 8.38 K/UL (ref 3.9–12.7)

## 2023-01-12 PROCEDURE — 99214 OFFICE O/P EST MOD 30 MIN: CPT | Mod: FS,,, | Performed by: INTERNAL MEDICINE

## 2023-01-12 PROCEDURE — 36415 COLL VENOUS BLD VENIPUNCTURE: CPT | Performed by: EMERGENCY MEDICINE

## 2023-01-12 PROCEDURE — 96366 THER/PROPH/DIAG IV INF ADDON: CPT

## 2023-01-12 PROCEDURE — 85730 THROMBOPLASTIN TIME PARTIAL: CPT | Performed by: EMERGENCY MEDICINE

## 2023-01-12 PROCEDURE — 96375 TX/PRO/DX INJ NEW DRUG ADDON: CPT

## 2023-01-12 PROCEDURE — G0378 HOSPITAL OBSERVATION PER HR: HCPCS

## 2023-01-12 PROCEDURE — 90935 HEMODIALYSIS ONE EVALUATION: CPT

## 2023-01-12 PROCEDURE — 99285 EMERGENCY DEPT VISIT HI MDM: CPT | Mod: 25

## 2023-01-12 PROCEDURE — 93010 EKG 12-LEAD: ICD-10-PCS | Mod: ,,, | Performed by: SPECIALIST

## 2023-01-12 PROCEDURE — 93010 ELECTROCARDIOGRAM REPORT: CPT | Mod: ,,, | Performed by: SPECIALIST

## 2023-01-12 PROCEDURE — 83735 ASSAY OF MAGNESIUM: CPT | Performed by: EMERGENCY MEDICINE

## 2023-01-12 PROCEDURE — 63600175 PHARM REV CODE 636 W HCPCS: Performed by: EMERGENCY MEDICINE

## 2023-01-12 PROCEDURE — 63600175 PHARM REV CODE 636 W HCPCS: Mod: JG | Performed by: INTERNAL MEDICINE

## 2023-01-12 PROCEDURE — 85610 PROTHROMBIN TIME: CPT | Performed by: EMERGENCY MEDICINE

## 2023-01-12 PROCEDURE — 84702 CHORIONIC GONADOTROPIN TEST: CPT | Performed by: EMERGENCY MEDICINE

## 2023-01-12 PROCEDURE — 93306 TTE W/DOPPLER COMPLETE: CPT

## 2023-01-12 PROCEDURE — 96376 TX/PRO/DX INJ SAME DRUG ADON: CPT | Mod: 59

## 2023-01-12 PROCEDURE — 93306 TTE W/DOPPLER COMPLETE: CPT | Mod: 26,,, | Performed by: INTERNAL MEDICINE

## 2023-01-12 PROCEDURE — 96365 THER/PROPH/DIAG IV INF INIT: CPT | Mod: 59

## 2023-01-12 PROCEDURE — 93306 ECHO (CUPID ONLY): ICD-10-PCS | Mod: 26,,, | Performed by: INTERNAL MEDICINE

## 2023-01-12 PROCEDURE — 80053 COMPREHEN METABOLIC PANEL: CPT | Performed by: EMERGENCY MEDICINE

## 2023-01-12 PROCEDURE — 83690 ASSAY OF LIPASE: CPT | Performed by: EMERGENCY MEDICINE

## 2023-01-12 PROCEDURE — 84484 ASSAY OF TROPONIN QUANT: CPT | Performed by: EMERGENCY MEDICINE

## 2023-01-12 PROCEDURE — 99214 PR OFFICE/OUTPT VISIT, EST, LEVL IV, 30-39 MIN: ICD-10-PCS | Mod: FS,,, | Performed by: INTERNAL MEDICINE

## 2023-01-12 PROCEDURE — 25000003 PHARM REV CODE 250: Performed by: INTERNAL MEDICINE

## 2023-01-12 PROCEDURE — 63600175 PHARM REV CODE 636 W HCPCS: Performed by: INTERNAL MEDICINE

## 2023-01-12 PROCEDURE — 85025 COMPLETE CBC W/AUTO DIFF WBC: CPT | Performed by: EMERGENCY MEDICINE

## 2023-01-12 PROCEDURE — 96375 TX/PRO/DX INJ NEW DRUG ADDON: CPT | Mod: 59

## 2023-01-12 PROCEDURE — 82962 GLUCOSE BLOOD TEST: CPT

## 2023-01-12 PROCEDURE — 93005 ELECTROCARDIOGRAM TRACING: CPT | Mod: 59 | Performed by: SPECIALIST

## 2023-01-12 RX ORDER — CLONIDINE 0.2 MG/24H
1 PATCH, EXTENDED RELEASE TRANSDERMAL
Status: DISCONTINUED | OUTPATIENT
Start: 2023-01-13 | End: 2023-01-13 | Stop reason: HOSPADM

## 2023-01-12 RX ORDER — GABAPENTIN 100 MG/1
100 CAPSULE ORAL 2 TIMES DAILY
Status: DISCONTINUED | OUTPATIENT
Start: 2023-01-12 | End: 2023-01-13 | Stop reason: HOSPADM

## 2023-01-12 RX ORDER — LABETALOL HYDROCHLORIDE 5 MG/ML
10 INJECTION, SOLUTION INTRAVENOUS EVERY 6 HOURS PRN
Status: DISCONTINUED | OUTPATIENT
Start: 2023-01-13 | End: 2023-01-13 | Stop reason: HOSPADM

## 2023-01-12 RX ORDER — HYDRALAZINE HYDROCHLORIDE 20 MG/ML
10 INJECTION INTRAMUSCULAR; INTRAVENOUS
Status: COMPLETED | OUTPATIENT
Start: 2023-01-12 | End: 2023-01-12

## 2023-01-12 RX ORDER — METOCLOPRAMIDE HYDROCHLORIDE 5 MG/ML
10 INJECTION INTRAMUSCULAR; INTRAVENOUS
Status: COMPLETED | OUTPATIENT
Start: 2023-01-12 | End: 2023-01-12

## 2023-01-12 RX ORDER — IBUPROFEN 200 MG
16 TABLET ORAL
Status: DISCONTINUED | OUTPATIENT
Start: 2023-01-12 | End: 2023-01-13 | Stop reason: HOSPADM

## 2023-01-12 RX ORDER — DICYCLOMINE HYDROCHLORIDE 10 MG/1
10 CAPSULE ORAL 3 TIMES DAILY
Status: DISCONTINUED | OUTPATIENT
Start: 2023-01-12 | End: 2023-01-13 | Stop reason: HOSPADM

## 2023-01-12 RX ORDER — GLUCAGON 1 MG
1 KIT INJECTION
Status: DISCONTINUED | OUTPATIENT
Start: 2023-01-12 | End: 2023-01-13 | Stop reason: HOSPADM

## 2023-01-12 RX ORDER — HYDROMORPHONE HYDROCHLORIDE 1 MG/ML
1 INJECTION, SOLUTION INTRAMUSCULAR; INTRAVENOUS; SUBCUTANEOUS
Status: COMPLETED | OUTPATIENT
Start: 2023-01-12 | End: 2023-01-12

## 2023-01-12 RX ORDER — INSULIN ASPART 100 [IU]/ML
5 INJECTION, SOLUTION INTRAVENOUS; SUBCUTANEOUS
Status: DISCONTINUED | OUTPATIENT
Start: 2023-01-12 | End: 2023-01-13 | Stop reason: HOSPADM

## 2023-01-12 RX ORDER — HYDROMORPHONE HYDROCHLORIDE 1 MG/ML
0.5 INJECTION, SOLUTION INTRAMUSCULAR; INTRAVENOUS; SUBCUTANEOUS
Status: DISCONTINUED | OUTPATIENT
Start: 2023-01-12 | End: 2023-01-13

## 2023-01-12 RX ORDER — ACETAMINOPHEN 325 MG/1
650 TABLET ORAL EVERY 4 HOURS PRN
Status: DISCONTINUED | OUTPATIENT
Start: 2023-01-12 | End: 2023-01-13 | Stop reason: HOSPADM

## 2023-01-12 RX ORDER — PANTOPRAZOLE SODIUM 40 MG/1
40 TABLET, DELAYED RELEASE ORAL DAILY
Status: DISCONTINUED | OUTPATIENT
Start: 2023-01-13 | End: 2023-01-13 | Stop reason: HOSPADM

## 2023-01-12 RX ORDER — LEVETIRACETAM 500 MG/1
500 TABLET ORAL 2 TIMES DAILY
Status: DISCONTINUED | OUTPATIENT
Start: 2023-01-12 | End: 2023-01-13 | Stop reason: HOSPADM

## 2023-01-12 RX ORDER — FUROSEMIDE 10 MG/ML
100 INJECTION INTRAMUSCULAR; INTRAVENOUS
Status: COMPLETED | OUTPATIENT
Start: 2023-01-12 | End: 2023-01-12

## 2023-01-12 RX ORDER — INSULIN ASPART 100 [IU]/ML
1-10 INJECTION, SOLUTION INTRAVENOUS; SUBCUTANEOUS
Status: DISCONTINUED | OUTPATIENT
Start: 2023-01-12 | End: 2023-01-13 | Stop reason: HOSPADM

## 2023-01-12 RX ORDER — ONDANSETRON 2 MG/ML
4 INJECTION INTRAMUSCULAR; INTRAVENOUS
Status: COMPLETED | OUTPATIENT
Start: 2023-01-12 | End: 2023-01-12

## 2023-01-12 RX ORDER — METOCLOPRAMIDE HYDROCHLORIDE 5 MG/ML
10 INJECTION INTRAMUSCULAR; INTRAVENOUS EVERY 6 HOURS
Status: DISCONTINUED | OUTPATIENT
Start: 2023-01-12 | End: 2023-01-12

## 2023-01-12 RX ORDER — ISOSORBIDE MONONITRATE 60 MG/1
120 TABLET, EXTENDED RELEASE ORAL DAILY
Status: DISCONTINUED | OUTPATIENT
Start: 2023-01-13 | End: 2023-01-13 | Stop reason: HOSPADM

## 2023-01-12 RX ORDER — FUROSEMIDE 40 MG/1
40 TABLET ORAL 2 TIMES DAILY
Status: DISCONTINUED | OUTPATIENT
Start: 2023-01-13 | End: 2023-01-13 | Stop reason: HOSPADM

## 2023-01-12 RX ORDER — MIRTAZAPINE 7.5 MG/1
7.5 TABLET, FILM COATED ORAL NIGHTLY
Status: DISCONTINUED | OUTPATIENT
Start: 2023-01-12 | End: 2023-01-13 | Stop reason: HOSPADM

## 2023-01-12 RX ORDER — IBUPROFEN 200 MG
24 TABLET ORAL
Status: DISCONTINUED | OUTPATIENT
Start: 2023-01-12 | End: 2023-01-13 | Stop reason: HOSPADM

## 2023-01-12 RX ORDER — HYDRALAZINE HYDROCHLORIDE 25 MG/1
100 TABLET, FILM COATED ORAL EVERY 8 HOURS
Status: DISCONTINUED | OUTPATIENT
Start: 2023-01-12 | End: 2023-01-13 | Stop reason: HOSPADM

## 2023-01-12 RX ORDER — LACTULOSE 10 G/15ML
30 SOLUTION ORAL 3 TIMES DAILY
Status: DISCONTINUED | OUTPATIENT
Start: 2023-01-12 | End: 2023-01-13 | Stop reason: HOSPADM

## 2023-01-12 RX ADMIN — ONDANSETRON 4 MG: 2 INJECTION INTRAMUSCULAR; INTRAVENOUS at 12:01

## 2023-01-12 RX ADMIN — EPOETIN ALFA-EPBX 10000 UNITS: 10000 INJECTION, SOLUTION INTRAVENOUS; SUBCUTANEOUS at 09:01

## 2023-01-12 RX ADMIN — FUROSEMIDE 100 MG: 10 INJECTION, SOLUTION INTRAVENOUS at 03:01

## 2023-01-12 RX ADMIN — HYDROMORPHONE HYDROCHLORIDE 1 MG: 1 INJECTION, SOLUTION INTRAMUSCULAR; INTRAVENOUS; SUBCUTANEOUS at 12:01

## 2023-01-12 RX ADMIN — HYDROMORPHONE HYDROCHLORIDE 0.5 MG: 1 INJECTION, SOLUTION INTRAMUSCULAR; INTRAVENOUS; SUBCUTANEOUS at 05:01

## 2023-01-12 RX ADMIN — HYDRALAZINE HYDROCHLORIDE 10 MG: 20 INJECTION, SOLUTION INTRAMUSCULAR; INTRAVENOUS at 03:01

## 2023-01-12 RX ADMIN — HYDROMORPHONE HYDROCHLORIDE 0.5 MG: 1 INJECTION, SOLUTION INTRAMUSCULAR; INTRAVENOUS; SUBCUTANEOUS at 11:01

## 2023-01-12 RX ADMIN — INSULIN HUMAN 5 UNITS/HR: 1 INJECTION, SOLUTION INTRAVENOUS at 02:01

## 2023-01-12 RX ADMIN — METOCLOPRAMIDE 10 MG: 5 INJECTION, SOLUTION INTRAMUSCULAR; INTRAVENOUS at 12:01

## 2023-01-12 RX ADMIN — HYDROMORPHONE HYDROCHLORIDE 0.5 MG: 1 INJECTION, SOLUTION INTRAMUSCULAR; INTRAVENOUS; SUBCUTANEOUS at 08:01

## 2023-01-12 RX ADMIN — LABETALOL HYDROCHLORIDE 10 MG: 5 INJECTION, SOLUTION INTRAVENOUS at 11:01

## 2023-01-12 NOTE — CONSULTS
[facility]  Department of Cardiology  Consult Note      PATIENT NAME: Tabby Howard  MRN: [id number]  TODAY'S DATE: 2023  ADMIT DATE: 2023                          CONSULT REQUESTED BY: Alfonso Garsia MD    SUBJECTIVE     PRINCIPAL PROBLEM: <principal problem not specified>      REASON FOR CONSULT:  Large pericardial effusion      FROM H&P: UNABLE TO OBTAIN INFO FROM PATIENT. SHE HAD PAIN MEDICATION RECENTLY. DROWSY, POOR HISTORIAN     HPI: 33 year old patient getting admitted with abdominal pain/hyperglycemia/pericardial effusion  Pt was supposed to have dialysis today and missed HD due to abdominal pain and came to ER  Describes abdominal pain as severe , all over with radiation to back . Associated with nausea  CT abdomen done 1 week ago showing large pericardial effusion  Stat ECHO ordered in ER   Pt was found to be Hyperglycemic and started on insulin drip and got admitted to ICU    EKG shows normal sinus rhythm, possible left atrial enlargement, prolonged QT.  H&H 8.5/24.7, RBC 3.06, platelet 33.  Potassium 4.1, creatinine 4.7, BUN 42, glucose 674, magnesium 1.9    Review of patient's allergies indicates:   Allergen Reactions    Penicillins Hives       Past Medical History:   Diagnosis Date    CKD (chronic kidney disease), stage IV 2022    Diabetes mellitus due to underlying condition with unspecified complications 2022    Gastroparesis 2022    Heart failure with preserved ejection fraction 2022    EF 55% on 3/22    History of gastroesophageal reflux (GERD)     History of supraventricular tachycardia     Hyperkalemia 2022    Hypertensive emergency 2022    Sickle cell trait 2022    Type 2 diabetes mellitus      Past Surgical History:   Procedure Laterality Date     SECTION      x 3    COLONOSCOPY      COLONOSCOPY N/A 2022    Procedure: COLONOSCOPY;  Surgeon: Jagdeep Cedeno MD;  Location: [facility];  Service: Endoscopy;  Laterality: N/A;     ESOPHAGOGASTRODUODENOSCOPY N/A 10/18/2019    Procedure: ESOPHAGOGASTRODUODENOSCOPY (EGD);  Surgeon: Gianluca Mendez MD;  Location: [facility] ENDO;  Service: Endoscopy;  Laterality: N/A;    ESOPHAGOGASTRODUODENOSCOPY N/A 08/24/2022    Procedure: EGD (ESOPHAGOGASTRODUODENOSCOPY);  Surgeon: Micky Paredes III, MD;  Location: [facility] ENDO;  Service: Endoscopy;  Laterality: N/A;    ESOPHAGOGASTRODUODENOSCOPY N/A 12/5/2022    Procedure: EGD (ESOPHAGOGASTRODUODENOSCOPY);  Surgeon: Marcelo Zhong MD;  Location: [facility] ENDO;  Service: Endoscopy;  Laterality: N/A;    LAPAROSCOPIC CHOLECYSTECTOMY N/A 07/30/2022    Procedure: CHOLECYSTECTOMY, LAPAROSCOPIC;  Surgeon: Grey Perez MD;  Location: [facility] OR;  Service: General;  Laterality: N/A;    PLACEMENT OF DUAL-LUMEN VASCULAR CATHETER Left 07/12/2022    Procedure: INSERTION-CATHETER-JOSEPH;  Surgeon: Dionte Gan MD;  Location: [facility] OR;  Service: General;  Laterality: Left;    PLACEMENT OF DUAL-LUMEN VASCULAR CATHETER Right 07/26/2022    Procedure: INSERTION-CATHETER-Hemosplit;  Surgeon: Dionte Gan MD;  Location: [facility] OR;  Service: General;  Laterality: Right;     Social History     Tobacco Use    Smoking status: Never    Smokeless tobacco: Never   Substance Use Topics    Alcohol use: Not Currently    Drug use: No        REVIEW OF SYSTEMS  Review of Systems   Unable to obtain.  Patient recently received pain medicine and is very drowsy and does not want to participate in conversation.    OBJECTIVE     VITAL SIGNS (Most Recent)  Temp: 98.4 °F (36.9 °C) (01/12/23 1101)  Pulse: 98 (01/12/23 1321)  Resp: 20 (01/12/23 1245)  BP: (!) 162/90 (01/12/23 1321)  SpO2: (!) 92 % (01/12/23 1321)    VENTILATION STATUS  Resp: 20 (01/12/23 1245)  SpO2: (!) 92 % (01/12/23 1321)       I & O (Last 24H):No intake or output data in the 24 hours ending 01/12/23 1433    WEIGHTS  Wt Readings from Last 3 Encounters:   01/07/23 2340 59.9 kg (132 lb)   01/06/23 1700 48.6 kg (107 lb 2.3 oz)    01/06/23 0022 59.9 kg (132 lb 0.9 oz)   01/05/23 1023 59.9 kg (132 lb)   12/23/22 0929 59.9 kg (132 lb 0.9 oz)       PHYSICAL EXAM  GENERAL:  Thin chronically ill appearing female in no apparent distress.  Drowsy.  Awakes to physical stimulation, intermittently answers questions and falls asleep quickly.  HEENT: Normocephalic. Pupils normal and conjunctivae normal.    NECK: No JVD. No bruit..   THYROID: Thyroid not examined  CARDIAC: Regular rate and rhythm. S1 is normal.S2 is normal.  2/6 systolic murmur  CHEST ANATOMY: normal.   LUNGS:  Crackles in bilateral lower lobes. No wheezing or rhonchi..   ABDOMEN: didstended ,hypoactive bowel sounds.  Tender to palpation   URINARY: No [place] catheter   EXTREMITIES: No cyanosis, clubbing or edema noted at this time.,  PERIPHERAL VASCULAR SYSTEM: Good palpable distal pulses.   CENTRAL NERVOUS SYSTEM: No focal motor or sensory deficits noted.   SKIN: Skin without lesions, moist, well perfused.   MUSCLE STRENGTH & TONE: No noteable weakness, atrophy or abnormal movement.     HOME MEDICATIONS:  No current facility-administered medications on file prior to encounter.     Current Outpatient Medications on File Prior to Encounter   Medication Sig Dispense Refill    albuterol (PROVENTIL/VENTOLIN HFA) 90 mcg/actuation inhaler Inhale 2 puffs into the lungs every 6 (six) hours as needed for Wheezing. Rescue 18 g 3    amLODIPine (NORVASC) 10 MG tablet Take 1 tablet (10 mg total) by mouth once daily. 30 tablet 11    apixaban (ELIQUIS) 5 mg Tab Take 1 tablet (5 mg total) by mouth 2 (two) times daily. 60 tablet 2    carvediloL (COREG) 25 MG tablet Take 1 tablet (25 mg total) by mouth 2 (two) times daily. 60 tablet 2    cloNIDine 0.2 mg/24 hr td ptwk (CATAPRES) 0.2 mg/24 hr Place 1 patch onto the skin every 7 days. 4 patch 2    dicyclomine (BENTYL) 10 MG capsule Take 1 capsule (10 mg total) by mouth 3 (three) times daily. 90 capsule 0    ferrous sulfate 325 (65 FE) MG EC tablet Take 325  "mg by mouth once daily.      fluconazole (DIFLUCAN) 150 MG Tab 1 tablet Orally once for 1 day(s)      FLUoxetine 20 MG capsule Take 1 capsule (20 mg total) by mouth once daily. 30 capsule 11    furosemide (LASIX) 40 MG tablet Take 40 mg by mouth 2 (two) times a day.      gabapentin (NEURONTIN) 100 MG capsule Take 1 capsule (100 mg total) by mouth 2 (two) times daily. 90 capsule 2    hydrALAZINE (APRESOLINE) 100 MG tablet Take 1 tablet (100 mg total) by mouth every 8 (eight) hours. 90 tablet 2    insulin aspart U-100 (NOVOLOG) 100 unit/mL (3 mL) InPn pen Inject 1-10 Units into the skin before meals and at bedtime as needed (Hyperglycemia). Max daily dose 40 units. 3 mL 6    insulin detemir U-100 (LEVEMIR FLEXTOUCH) 100 unit/mL (3 mL) SubQ InPn pen Inject 9 Units into the skin once daily. 6 mL 2    insulin glargine 100 units/mL SubQ pen 28u Subcutaneous once a day for 30 days      insulin lispro 100 unit/mL pen 8u Subcutaneous TID with meals for 90 days      isosorbide mononitrate (IMDUR) 60 MG 24 hr tablet Take 2 tablets (120 mg total) by mouth once daily. 30 tablet 11    levETIRAcetam (KEPPRA) 500 MG Tab Take 1 tablet (500 mg total) by mouth 2 (two) times daily. 60 tablet 11    losartan-hydrochlorothiazide 100-12.5 mg (HYZAAR) 100-12.5 mg Tab 1 tablet Orally Once a day for 30 day(s)      mirtazapine (REMERON SOL-TAB) 15 MG disintegrating tablet Take 1 tablet (15 mg total) by mouth nightly. 90 tablet 0    ondansetron (ZOFRAN) 4 MG tablet Take 1 tablet (4 mg total) by mouth every 8 (eight) hours as needed for Nausea. 60 tablet 2    pantoprazole (PROTONIX) 40 MG tablet Take 40 mg by mouth once daily.      pen needle, diabetic (BD ULTRA-FINE MAGALIE PEN NEEDLE) 32 gauge x 5/32" Ndle Inject into the skin 5 times per day with insulin 100 each 12    promethazine (PHENERGAN) 25 MG tablet Take 1 tablet (25 mg total) by mouth every 12 (twelve) hours as needed (nausea/vomiting not relieved with ondansetron (zofran)). 60 tablet 0 "    [DISCONTINUED] atenoloL (TENORMIN) 50 MG tablet Take 1 tablet (50 mg total) by mouth every other day. 45 tablet 3    [DISCONTINUED] omeprazole (PRILOSEC) 20 MG capsule Take 2 capsules (40 mg total) by mouth once daily. for 10 days 20 capsule 0    [DISCONTINUED] torsemide (DEMADEX) 20 MG Tab Take 1 tablet (20 mg total) by mouth 2 (two) times a day. (Patient taking differently: Take 20 mg by mouth once daily.) 60 tablet 1       SCHEDULED MEDS:   epoetin teresa-ebpx (RETACRIT) injection  10,000 Units Intravenous Every Tues, Thurs, Sat       CONTINUOUS INFUSIONS:   insulin regular 1 units/mL infusion orderable ([facility]) 5 Units/hr (01/12/23 1425)       PRN MEDS:dextrose 10%, dextrose 10%    LABS AND DIAGNOSTICS     CBC LAST 3 DAYS  Recent Labs   Lab 01/07/23  0823 01/08/23  0008 01/12/23  1318   WBC 4.96 6.93 8.38   RBC 4.37 4.76 3.06*   HGB 12.2 13.4 8.5*   HCT 36.4* 39.5 24.7*   MCV 83 83 81*   MCH 27.9 28.2 27.8   MCHC 33.5 33.9 34.4   RDW 15.0* 15.5* 14.6*   PLT 79* 102* 33*   MPV 9.8 10.0 10.9   GRAN 86.3*  4.3 83.4*  5.8 86.3*  7.2   LYMPH 9.9*  0.5* 9.7*  0.7* 5.7*  0.5*   MONO 2.4*  0.1* 6.1  0.4 7.2  0.6   BASO 0.03 0.02 0.02   NRBC 0 0 0       COAGULATION LAST 3 DAYS  Recent Labs   Lab 01/12/23  1219   INR 1.0   APTT 29.2       CHEMISTRY LAST 3 DAYS  Recent Labs   Lab 01/07/23  0614 01/08/23  0008 01/12/23  1219    140 130*   K 4.1 4.1 4.1    98 92*   CO2 18* 21* 27   ANIONGAP 16 21* 11   BUN 27* 21* 42*   CREATININE 3.0* 2.6* 4.7*   * 248* 674*   CALCIUM 9.6 9.6 8.6*   MG  --   --  1.9   ALBUMIN 3.6 4.2 3.5   PROT 7.9 8.9* 7.1   ALKPHOS 342* 345* 215*   ALT 79* 79* 31   AST 45* 39 23   BILITOT 2.6* 2.9* 1.7*       CARDIAC PROFILE LAST 3 DAYS  Recent Labs   Lab 01/12/23  1219   TROPONINIHS 88.6*       ENDOCRINE LAST 3 DAYS  No results for input(s): TSH, PROCAL in the last 168 hours.    LAST ARTERIAL BLOOD GAS  ABG  No results for input(s): PH, PO2, PCO2, HCO3, BE in the last 168  hours.    LAST 7 DAYS MICROBIOLOGY   Microbiology Results (last 7 days)       ** No results found for the last 168 hours. **            MOST RECENT IMAGING  CT Abdomen Pelvis  Without Contrast  CMS MANDATED QUALITY DATA - CT RADIATION - 436    All CT scans at this facility utilize dose modulation, iterative reconstruction, and/or weight based dosing when appropriate to reduce radiation dose to as low as reasonably achievable.    Reason: Abdominal pain, post-op; Abdominal abscess/infection suspected; Abdominal pain, acute, nonlocalized    TECHNIQUE: CT abdomen and pelvis without IV contrast.    COMPARISON: CT abdomen pelvis December 23, 2022.    FINDINGS:    There is subsegmental atelectasis versus infiltrates of the lung bases, more so on the left. The heart size is normal. A large pericardial effusion is noted. This is similar to previous exam.    Liver is normal size. No gross hepatic lesion identified. The gallbladder has been removed. The pancreas, spleen and adrenal glands are unremarkable. The kidneys are normal size. There is no hydronephrosis or nephrolithiasis. The ureters are not well visualized. The bladder is fluid-filled with no focal wall abnormalities. The uterus and adnexal structures are grossly unremarkable.    Large and small bowel are normal caliber. The appendix is normal. No bowel wall thickening or inflammatory changes observed. Stomach is unremarkable. There is trace fluid in the right pericolic gutter and in the subphrenic space. Marked atherosclerosis of intra-abdominal and pelvic arteries again demonstrated. There is subcutaneous fat stranding of the abdominal wall and upper thigh subcutaneous tissues, felt to reflect mild anasarca.    There is no acute osseous abnormality.    IMPRESSION:    1.  Small amount of free fluid noted in the right pericolic gutter and in the subphrenic space.  2.  Evaluation is limited with lack of IV contrast.  3.  Large stable pericardial  effusion.    Electronically signed by:  Juaquin Will DO  1/5/2023 1:04 PM CST Workstation: [id number]      ECHOCARDIOGRAM RESULTS (last 5)  Results for orders placed during the hospital encounter of 11/19/22    Echo    Interpretation Summary  · The left ventricle is normal in size with concentric remodeling and normal systolic function.  · The estimated ejection fraction is 60%.  · There is right ventricular hypertrophy.  · Normal right ventricular size.  · Small circumferential pericardial effusion. Effusion is fluid.  · There is no evidence of tamponade or constriction  · Pericardial effusions approximally 1.5 cm posteriorly and over the right heart  · No evidence of ventricular interdependence  · No right atrial or right ventricle collapse  · Fusion may be slightly smaller than seen previously; this was limited echo      Results for orders placed during the hospital encounter of 10/26/22    Echo Saline Bubble? No    Interpretation Summary  · The left ventricular global longitudinal strain is --11.45%%.  · Moderate concentric hypertrophy and moderately decreased systolic function.  · The estimated ejection fraction is 34%.  · Left ventricular diastolic dysfunction.  · Strain study demonstrates apical sparing with normal G LS at the apex a -20 and then in the midportion bases -10- 7  · Strain study is compatible with amyloid but not diagnostic of amyloid      Echo    Interpretation Summary  · The left ventricle is normal in size with moderate moderate asymmetric hypertrophy eccentric hypertrophy and normal systolic function.  · The estimated ejection fraction is 55%.  · Indeterminate left ventricular diastolic function with normal left atrial pressure.  · There are segmental left ventricular wall motion abnormalities.  · Atrial fibrillation not observed.  · Normal right ventricular size with normal right ventricular systolic function.  · There is right ventricular hypertrophy.  · There is abnormal septal wall  motion. There is systolic flattening of the interventricular septum consistent with right ventricle pressure overload.  · Mild to moderate tricuspid regurgitation.  · Normal central venous pressure (3 mmHg).  · The estimated PA systolic pressure is 35 mmHg.  · Moderate circumferential pericardial effusion. Effusion is fluid.    1. Small to moderate pericardial effusion without any evidence of tamponade  No ventricular interdependence or right atrial right ventricle collapse  2. Left ventricle hypertrophy with posterior wall thicker than septum  Septal hypokinesis noted  3. Pulmonary hypertension mild  4. Prominent eustachian ridge in the right atrium      Results for orders placed during the hospital encounter of 07/07/22    Echo    Interpretation Summary  · The left ventricle is normal in size with moderate concentric hypertrophy and low normal systolic function.  · The estimated ejection fraction is 50%.  · Normal left ventricular diastolic function.  · Normal right ventricular size with normal right ventricular systolic function.  · Mild left atrial enlargement.  · Mild right atrial enlargement.  · Intermediate central venous pressure (8 mmHg).  · Moderate circumferential pericardial effusion.      CURRENT/PREVIOUS VISIT EKG  Results for orders placed or performed during the hospital encounter of 01/12/23   EKG 12-lead    Collection Time: 01/12/23  1:54 PM    Narrative    Test Reason : I10,    Vent. Rate : 098 BPM     Atrial Rate : 098 BPM     P-R Int : 188 ms          QRS Dur : 080 ms      QT Int : 388 ms       P-R-T Axes : 050 -14 051 degrees     QTc Int : 495 ms    Normal sinus rhythm  Possible Left atrial enlargement  Prolonged QT  Abnormal ECG  When compared with ECG of 05-JAN-2023 10:32,  No significant change was found    Referred By: [email or web address]   SELF           Confirmed By:            ASSESSMENT/PLAN:     There are no active hospital problems to display for this patient.      ASSESSMENT & PLAN:      Pericardial effusion   On hemodialysis     RECOMMENDATIONS:  ECHO TODAY  The left ventricle is normal in size with moderate concentric hypertrophy and normal systolic function.  The estimated ejection fraction is 55%.  Grade III left ventricular diastolic dysfunction.  Normal right ventricular size with normal right ventricular systolic function.  Moderate left atrial enlargement.  Moderate to severe tricuspid regurgitation.  Mild to moderate pulmonic regurgitation.  Mild mitral regurgitation.  Normal central venous pressure (3 mmHg).  The estimated PA systolic pressure is 56 mmHg.  There is pulmonary hypertension.  Moderate to large circumferential pericardial effusion. No evidence of tamponade.  2.  Patient is non tachycardic and hypertensive.  Not clinically in cardiac tamponade.  Recommend consultation with CT surgery.  3.  Recommend HD today. .  Thank you for the consultation.  Will follow.      Suzette Braswell NP  [facility]  Department of Cardiology  Date of Service: 01/12/2023      No evidence of tamponade on ECHO and clinically. Mild troponin elevation likely from renal failure.   Low platelets on CBC. Hold eliquis until improvement in platelets.  Consider Hematology consult.     I have personally interviewed and examined the patient, I have reviewed the Nurse Practitioner's history and physical, assessment, and plan. I agree with the findings and plan.      GIGI Velásquez MD  [facility]  Department of Cardiology  Date of Service: 01/12/2023

## 2023-01-12 NOTE — HPI
33 year old patient getting admitted with abdominal pain/hyperglycemia/pericardial effusion  Pt was supposed to have dialysis today and missed HD due to abdominal pain and came to ER  Describes abdominal pain as severe , all over with radiation to back . Associated with nausea  CT abdomen done 1 week ago showing large pericardial effusion  Stat ECHO ordered in ER   Pt was found to be Hyperglycemic and started on insulin drip and got admitted to ICU

## 2023-01-12 NOTE — SUBJECTIVE & OBJECTIVE
Past Medical History:   Diagnosis Date    CKD (chronic kidney disease), stage IV 2022    Diabetes mellitus due to underlying condition with unspecified complications 2022    Gastroparesis 2022    Heart failure with preserved ejection fraction 2022    EF 55% on 3/22    History of gastroesophageal reflux (GERD)     History of supraventricular tachycardia     Hyperkalemia 2022    Hypertensive emergency 2022    Sickle cell trait 2022    Type 2 diabetes mellitus        Past Surgical History:   Procedure Laterality Date     SECTION      x 3    COLONOSCOPY      COLONOSCOPY N/A 2022    Procedure: COLONOSCOPY;  Surgeon: Jagdeep Cedeno MD;  Location: [facility];  Service: Endoscopy;  Laterality: N/A;    ESOPHAGOGASTRODUODENOSCOPY N/A 10/18/2019    Procedure: ESOPHAGOGASTRODUODENOSCOPY (EGD);  Surgeon: Gianluca Mendez MD;  Location: [facility];  Service: Endoscopy;  Laterality: N/A;    ESOPHAGOGASTRODUODENOSCOPY N/A 2022    Procedure: EGD (ESOPHAGOGASTRODUODENOSCOPY);  Surgeon: Micky Paredes III, MD;  Location: [facility];  Service: Endoscopy;  Laterality: N/A;    ESOPHAGOGASTRODUODENOSCOPY N/A 2022    Procedure: EGD (ESOPHAGOGASTRODUODENOSCOPY);  Surgeon: Marcelo Zhong MD;  Location: [facility];  Service: Endoscopy;  Laterality: N/A;    LAPAROSCOPIC CHOLECYSTECTOMY N/A 2022    Procedure: CHOLECYSTECTOMY, LAPAROSCOPIC;  Surgeon: Grey Perez MD;  Location: [facility];  Service: General;  Laterality: N/A;    PLACEMENT OF DUAL-LUMEN VASCULAR CATHETER Left 2022    Procedure: INSERTION-CATHETER-JOSEPH;  Surgeon: Dionte Gan MD;  Location: [facility] OR;  Service: General;  Laterality: Left;    PLACEMENT OF DUAL-LUMEN VASCULAR CATHETER Right 2022    Procedure: INSERTION-CATHETER-Hemosplit;  Surgeon: Dionte Gan MD;  Location: [facility] OR;  Service: General;  Laterality: Right;       Review of patient's allergies indicates:   Allergen Reactions     Penicillins Hives       No current facility-administered medications on file prior to encounter.     Current Outpatient Medications on File Prior to Encounter   Medication Sig    albuterol (PROVENTIL/VENTOLIN HFA) 90 mcg/actuation inhaler Inhale 2 puffs into the lungs every 6 (six) hours as needed for Wheezing. Rescue    amLODIPine (NORVASC) 10 MG tablet Take 1 tablet (10 mg total) by mouth once daily.    apixaban (ELIQUIS) 5 mg Tab Take 1 tablet (5 mg total) by mouth 2 (two) times daily.    carvediloL (COREG) 25 MG tablet Take 1 tablet (25 mg total) by mouth 2 (two) times daily.    cloNIDine 0.2 mg/24 hr td ptwk (CATAPRES) 0.2 mg/24 hr Place 1 patch onto the skin every 7 days.    dicyclomine (BENTYL) 10 MG capsule Take 1 capsule (10 mg total) by mouth 3 (three) times daily.    ferrous sulfate 325 (65 FE) MG EC tablet Take 325 mg by mouth once daily.    fluconazole (DIFLUCAN) 150 MG Tab 1 tablet Orally once for 1 day(s)    FLUoxetine 20 MG capsule Take 1 capsule (20 mg total) by mouth once daily.    furosemide (LASIX) 40 MG tablet Take 40 mg by mouth 2 (two) times a day.    gabapentin (NEURONTIN) 100 MG capsule Take 1 capsule (100 mg total) by mouth 2 (two) times daily.    hydrALAZINE (APRESOLINE) 100 MG tablet Take 1 tablet (100 mg total) by mouth every 8 (eight) hours.    insulin aspart U-100 (NOVOLOG) 100 unit/mL (3 mL) InPn pen Inject 1-10 Units into the skin before meals and at bedtime as needed (Hyperglycemia). Max daily dose 40 units.    insulin detemir U-100 (LEVEMIR FLEXTOUCH) 100 unit/mL (3 mL) SubQ InPn pen Inject 9 Units into the skin once daily.    insulin glargine 100 units/mL SubQ pen 28u Subcutaneous once a day for 30 days    insulin lispro 100 unit/mL pen 8u Subcutaneous TID with meals for 90 days    isosorbide mononitrate (IMDUR) 60 MG 24 hr tablet Take 2 tablets (120 mg total) by mouth once daily.    levETIRAcetam (KEPPRA) 500 MG Tab Take 1 tablet (500 mg total) by mouth 2 (two) times daily.     "losartan-hydrochlorothiazide 100-12.5 mg (HYZAAR) 100-12.5 mg Tab 1 tablet Orally Once a day for 30 day(s)    mirtazapine (REMERON SOL-TAB) 15 MG disintegrating tablet Take 1 tablet (15 mg total) by mouth nightly.    ondansetron (ZOFRAN) 4 MG tablet Take 1 tablet (4 mg total) by mouth every 8 (eight) hours as needed for Nausea.    pantoprazole (PROTONIX) 40 MG tablet Take 40 mg by mouth once daily.    pen needle, diabetic (BD ULTRA-FINE MAGALIE PEN NEEDLE) 32 gauge x 5/32" Ndle Inject into the skin 5 times per day with insulin    promethazine (PHENERGAN) 25 MG tablet Take 1 tablet (25 mg total) by mouth every 12 (twelve) hours as needed (nausea/vomiting not relieved with ondansetron (zofran)).    [DISCONTINUED] atenoloL (TENORMIN) 50 MG tablet Take 1 tablet (50 mg total) by mouth every other day.    [DISCONTINUED] omeprazole (PRILOSEC) 20 MG capsule Take 2 capsules (40 mg total) by mouth once daily. for 10 days    [DISCONTINUED] torsemide (DEMADEX) 20 MG Tab Take 1 tablet (20 mg total) by mouth 2 (two) times a day. (Patient taking differently: Take 20 mg by mouth once daily.)     Family History       Problem Relation (Age of Onset)    Diabetes Mother, Father          Tobacco Use    Smoking status: Never    Smokeless tobacco: Never   Substance and Sexual Activity    Alcohol use: Not Currently    Drug use: No    Sexual activity: Not Currently     Partners: Male     Birth control/protection: I.U.D.     Review of Systems   Constitutional:  Negative for activity change and appetite change.   HENT:  Negative for congestion and dental problem.    Eyes:  Negative for discharge and itching.   Respiratory:  Negative for shortness of breath.    Cardiovascular:  Negative for chest pain.   Gastrointestinal:  Positive for abdominal pain. Negative for abdominal distention.   Endocrine: Negative for cold intolerance.   Genitourinary:  Negative for difficulty urinating and dysuria.   Musculoskeletal:  Negative for arthralgias and back " pain.   Skin:  Negative for color change.   Neurological:  Negative for dizziness and facial asymmetry.   Hematological:  Negative for adenopathy.   Psychiatric/Behavioral:  Negative for agitation and behavioral problems.    Objective:     Vital Signs (Most Recent):  Temp: 98.4 °F (36.9 °C) (01/12/23 1101)  Pulse: 97 (01/12/23 1451)  Resp: 20 (01/12/23 1245)  BP: (!) 173/106 (01/12/23 1553)  SpO2: 95 % (01/12/23 1451)   Vital Signs (24h Range):  Temp:  [98.4 °F (36.9 °C)] 98.4 °F (36.9 °C)  Pulse:  [] 97  Resp:  [20] 20  SpO2:  [75 %-99 %] 95 %  BP: (135-173)/() 173/106        There is no height or weight on file to calculate BMI.    Physical Exam  Vitals and nursing note reviewed.   Constitutional:       General: She is not in acute distress.  HENT:      Head: Atraumatic.      Right Ear: External ear normal.      Left Ear: External ear normal.      Nose: Nose normal.      Mouth/Throat:      Mouth: Mucous membranes are moist.   Cardiovascular:      Rate and Rhythm: Normal rate.   Pulmonary:      Effort: Pulmonary effort is normal.      Breath sounds: Rales present.   Musculoskeletal:         General: Normal range of motion.      Cervical back: Normal range of motion.   Skin:     General: Skin is warm.   Neurological:      Mental Status: She is alert and oriented to person, place, and time.   Psychiatric:         Behavior: Behavior normal.           Significant Labs: All pertinent labs within the past 24 hours have been reviewed.  CBC:   Recent Labs   Lab 01/12/23  1318   WBC 8.38   HGB 8.5*   HCT 24.7*   PLT 33*     CMP:   Recent Labs   Lab 01/12/23  1219   *   K 4.1   CL 92*   CO2 27   *   BUN 42*   CREATININE 4.7*   CALCIUM 8.6*   PROT 7.1   ALBUMIN 3.5   BILITOT 1.7*   ALKPHOS 215*   AST 23   ALT 31   ANIONGAP 11       Significant Imaging: I have reviewed all pertinent imaging results/findings within the past 24 hours.

## 2023-01-12 NOTE — ED PROVIDER NOTES
Encounter Date: 2023       History     Chief Complaint   Patient presents with    Abdominal Pain     Patient with history end-stage renal disease.  Patient complains of continued severe diffuse abdominal pain with nausea vomiting.  History gastroparesis.  No diarrhea constipation.  No fever chills.  Patient does not make urine.  Patient does have diabetes.  Last dialysis was 2 days ago.  Patient unable to tolerate any oral hypertensive medicines.    Review of patient's allergies indicates:   Allergen Reactions    Penicillins Hives     Past Medical History:   Diagnosis Date    CKD (chronic kidney disease), stage IV 2022    Diabetes mellitus due to underlying condition with unspecified complications 2022    Gastroparesis 2022    Heart failure with preserved ejection fraction 2022    EF 55% on 3/22    History of gastroesophageal reflux (GERD)     History of supraventricular tachycardia     Hyperkalemia 2022    Hypertensive emergency 2022    Sickle cell trait 2022    Type 2 diabetes mellitus      Past Surgical History:   Procedure Laterality Date     SECTION      x 3    COLONOSCOPY      COLONOSCOPY N/A 2022    Procedure: COLONOSCOPY;  Surgeon: Jagdeep Cedeno MD;  Location: [facility];  Service: Endoscopy;  Laterality: N/A;    ESOPHAGOGASTRODUODENOSCOPY N/A 10/18/2019    Procedure: ESOPHAGOGASTRODUODENOSCOPY (EGD);  Surgeon: Gianluca Mendez MD;  Location: [facility];  Service: Endoscopy;  Laterality: N/A;    ESOPHAGOGASTRODUODENOSCOPY N/A 2022    Procedure: EGD (ESOPHAGOGASTRODUODENOSCOPY);  Surgeon: Micky Paredes III, MD;  Location: [facility];  Service: Endoscopy;  Laterality: N/A;    ESOPHAGOGASTRODUODENOSCOPY N/A 2022    Procedure: EGD (ESOPHAGOGASTRODUODENOSCOPY);  Surgeon: Marcelo Zhong MD;  Location: [facility];  Service: Endoscopy;  Laterality: N/A;    LAPAROSCOPIC CHOLECYSTECTOMY N/A 2022    Procedure: CHOLECYSTECTOMY, LAPAROSCOPIC;   Surgeon: Grey Perez MD;  Location: [facility] OR;  Service: General;  Laterality: N/A;    PLACEMENT OF DUAL-LUMEN VASCULAR CATHETER Left 07/12/2022    Procedure: INSERTION-CATHETER-JOSEPH;  Surgeon: Dionte Gan MD;  Location: [facility] OR;  Service: General;  Laterality: Left;    PLACEMENT OF DUAL-LUMEN VASCULAR CATHETER Right 07/26/2022    Procedure: INSERTION-CATHETER-Hemosplit;  Surgeon: Dionte Gan MD;  Location: [facility] OR;  Service: General;  Laterality: Right;     Family History   Problem Relation Age of Onset    Diabetes Mother     Diabetes Father      Social History     Tobacco Use    Smoking status: Never    Smokeless tobacco: Never   Substance Use Topics    Alcohol use: Not Currently    Drug use: No     Review of Systems   Constitutional:  Negative for chills and fever.   HENT:  Negative for congestion.    Eyes:  Negative for visual disturbance.   Respiratory:  Negative for shortness of breath.    Cardiovascular:  Negative for chest pain and palpitations.   Gastrointestinal:  Positive for abdominal pain, nausea and vomiting.   Genitourinary:  Negative for flank pain.   Musculoskeletal:  Negative for joint swelling.   Neurological:  Negative for seizures, syncope and headaches.   Psychiatric/Behavioral:  Negative for confusion.      Physical Exam     Initial Vitals [01/12/23 1101]   BP Pulse Resp Temp SpO2   (!) 161/105 (!) 121 20 98.4 °F (36.9 °C) 99 %      MAP       --         Physical Exam    Nursing note and vitals reviewed.  Constitutional: She is not diaphoretic. No distress.   HENT:   Head: Normocephalic and atraumatic.   Eyes: Conjunctivae are normal.   Neck:   Normal range of motion.  Cardiovascular:  Normal rate.           Pulmonary/Chest: Breath sounds normal.   Abdominal: Abdomen is soft.   Diffuse abdominal pain mostly in upper quadrants.  No guarding or rebound.   Musculoskeletal:         General: Normal range of motion.      Cervical back: Normal range of motion.     Neurological: She is  alert.   No gross deficits   Skin: No rash noted.   Psychiatric:   Alert, anxious appears in acute discomfort       ED Course   Critical Care    Date/Time: 1/12/2023 3:30 PM  Performed by: Micky Souza MD  Authorized by: Alfonso Garsia MD   Direct patient critical care time: 15 minutes  Additional history critical care time: 5 minutes  Ordering / reviewing critical care time: 5 minutes  Documentation critical care time: 5 minutes  Consulting other physicians critical care time: 5 minutes  Total critical care time (exclusive of procedural time) : 35 minutes      Labs Reviewed   TROPONIN I HIGH SENSITIVITY - Abnormal; Notable for the following components:       Result Value    Troponin I High Sensitivity 88.6 (*)     All other components within normal limits    Narrative:     glucose,troponin   critical result(s) called and verbal readback   obtained from porsche more rn by [facility] 01/12/2023 13:47   CBC W/ AUTO DIFFERENTIAL - Abnormal; Notable for the following components:    RBC 3.06 (*)     Hemoglobin 8.5 (*)     Hematocrit 24.7 (*)     MCV 81 (*)     RDW 14.6 (*)     Platelets 33 (*)     Lymph # 0.5 (*)     Gran % 86.3 (*)     Lymph % 5.7 (*)     Platelet Estimate Decreased (*)     All other components within normal limits    Narrative:     plt critical result(s) repeated. Called and verbal readback obtained   from Porsche More RN by [facility] 01/12/2023 13:38  Recoll. [id number] by [facility] at 01/12/2023 13:16, reason: recollect to   confirm hgb/plts per nurse   COMPREHENSIVE METABOLIC PANEL - Abnormal; Notable for the following components:    Sodium 130 (*)     Chloride 92 (*)     Glucose 674 (*)     BUN 42 (*)     Creatinine 4.7 (*)     Calcium 8.6 (*)     Total Bilirubin 1.7 (*)     Alkaline Phosphatase 215 (*)     eGFR 11.8 (*)     All other components within normal limits    Narrative:     glucose,troponin   critical result(s) called and verbal readback   obtained from porsche more rn by [facility] 01/12/2023  13:47   POCT GLUCOSE - Abnormal; Notable for the following components:    POC Glucose 555 (*)     All other components within normal limits   POCT GLUCOSE - Abnormal; Notable for the following components:    POC Glucose 437 (*)     All other components within normal limits   POCT GLUCOSE - Abnormal; Notable for the following components:    POC Glucose 326 (*)     All other components within normal limits   POCT GLUCOSE - Abnormal; Notable for the following components:    POC Glucose 241 (*)     All other components within normal limits   APTT   LIPASE   MAGNESIUM   PROTIME-INR   HCG, QUANTITATIVE   HCG, QUANTITATIVE        ECG Results              EKG 12-lead (In process)  Result time 01/12/23 13:57:29      In process by Interface, Lab In [facility] (01/12/23 13:57:29)                   Narrative:    Test Reason : I10,    Vent. Rate : 098 BPM     Atrial Rate : 098 BPM     P-R Int : 188 ms          QRS Dur : 080 ms      QT Int : 388 ms       P-R-T Axes : 050 -14 051 degrees     QTc Int : 495 ms    Normal sinus rhythm  Possible Left atrial enlargement  Prolonged QT  Abnormal ECG  When compared with ECG of 05-JAN-2023 10:32,  No significant change was found    Referred By: [email or web address]   SELF           Confirmed By:                                   Imaging Results              X-Ray Abdomen Flat And Erect (Final result)  Result time 01/12/23 15:08:07      Final result by Nahid Harding MD (01/12/23 15:08:07)                   Narrative:    Reason: Abd pain and nausea    FINDINGS:  Supine and left lateral decubitus views of the abdomen show no pneumoperitoneum. Moderate amount of stool lies throughout colon. Bowel gas pattern is nonobstructive.    Right upper quadrant surgical clips suggest cholecystectomy. Surgical clip also projects in midline of pelvis. Left pelvic calcifications suggesting phleboliths. Vascular calcification evident.    No acute osseous abnormality.    IMPRESSION:  Moderate amount of stool throughout the  colon.    Electronically signed by:  Nahid Harding MD  1/12/2023 3:08 PM CST Workstation: [id number]                                     X-Ray Chest AP Portable (Final result)  Result time 01/12/23 15:06:49   Procedure changed from X-Ray Chest PA And Lateral     Final result by Nahid Harding MD (01/12/23 15:06:49)                   Narrative:    Reason: Hypertension Hypertension    FINDINGS:  Portable chest at 1453 compared with 12/23/2022 shows unchanged enlarged cardiac silhouette size with normal mediastinal contours. Tunneled right IJ vascular catheter tips in right atrium unchanged.    Patchy right greater than left groundglass alveolar opacities are present. No pleural effusion or pneumothorax. No acute osseous abnormality.    IMPRESSION:  Patchy bilateral groundglass alveolar opacities suggest pulmonary edema. Clinical correlation is requested, as pneumonia could give a similar appearance.    Electronically signed by:  Nahid Harding MD  1/12/2023 3:06 PM CST Workstation: [id number]                                     Medications   insulin regular in 0.9 % NaCl 100 unit/100 mL (1 unit/mL) infusion (0.25 Units/hr Intravenous Rate/Dose Change 1/12/23 1939)   dextrose 10% bolus 125 mL 125 mL (has no administration in time range)   dextrose 10% bolus 250 mL 250 mL (has no administration in time range)   epoetin teresa-epbx injection 10,000 Units (has no administration in time range)   lactulose 20 gram/30 mL solution Soln 30 g (30 g Oral Not Given 1/12/23 1600)   cloNIDine 0.2 mg/24 hr td ptwk 1 patch (has no administration in time range)   dicyclomine capsule 10 mg (has no administration in time range)   furosemide tablet 40 mg (has no administration in time range)   gabapentin capsule 100 mg (has no administration in time range)   hydrALAZINE tablet 100 mg (has no administration in time range)   insulin detemir U-100 pen 10 Units (has no administration in time range)   isosorbide mononitrate 24 hr tablet 120 mg  (has no administration in time range)   levETIRAcetam tablet 500 mg (has no administration in time range)   mirtazapine tablet 7.5 mg (has no administration in time range)   pantoprazole EC tablet 40 mg (has no administration in time range)   acetaminophen tablet 650 mg (has no administration in time range)   glucose chewable tablet 16 g (has no administration in time range)   glucose chewable tablet 24 g (has no administration in time range)   glucagon (human recombinant) injection 1 mg (has no administration in time range)   insulin aspart U-100 pen 5 Units (5 Units Subcutaneous Not Given 1/12/23 1645)   insulin aspart U-100 pen 1-10 Units (has no administration in time range)   HYDROmorphone injection 0.5 mg (0.5 mg Intravenous Given 1/12/23 1710)   metoclopramide HCl injection 10 mg (10 mg Intravenous Given 1/12/23 1245)   ondansetron injection 4 mg (4 mg Intravenous Given 1/12/23 1245)   HYDROmorphone injection 1 mg (1 mg Intravenous Given 1/12/23 1245)   hydrALAZINE injection 10 mg (10 mg Intravenous Given 1/12/23 1553)   furosemide injection 100 mg (100 mg Intravenous Given 1/12/23 1553)     Medical Decision Making:   History:   Old Medical Records: I decided to obtain old medical records.  Clinical Tests:   Lab Tests: Reviewed  Radiological Study: Reviewed  Medical Tests: Reviewed  ED Management:  Patient presents with abdominal pain.  Questionable etiology.  Many similar presentation past.  Patient did drop her O2 saturation                        Clinical Impression:   Final diagnoses:  [I10] Accelerated hypertension  [I31.39] Pericardial effusion  [R10.9] Abdominal pain        ED Disposition Condition    Observation                 Micky Souza MD  01/12/23 2033

## 2023-01-12 NOTE — H&P
[facility] - Emergency Dept  Hospital Medicine  History & Physical    Patient Name: Tabby Howard  MRN: [id number]  Patient Class: OP- Observation  Admission Date: 2023  Attending Physician: Alfonso Garsia MD   Primary Care Provider: [facility]         Patient information was obtained from patient and ER records.     Subjective:     Principal Problem:Abdominal pain    Chief Complaint:   Chief Complaint   Patient presents with    Abdominal Pain        HPI: 33 year old patient getting admitted with abdominal pain/hyperglycemia/pericardial effusion  Pt was supposed to have dialysis today and missed HD due to abdominal pain and came to ER  Describes abdominal pain as severe , all over with radiation to back . Associated with nausea  CT abdomen done 1 week ago showing large pericardial effusion  Stat ECHO ordered in ER   Pt was found to be Hyperglycemic and started on insulin drip and got admitted to ICU      Past Medical History:   Diagnosis Date    CKD (chronic kidney disease), stage IV 2022    Diabetes mellitus due to underlying condition with unspecified complications 2022    Gastroparesis 2022    Heart failure with preserved ejection fraction 2022    EF 55% on 3/22    History of gastroesophageal reflux (GERD)     History of supraventricular tachycardia     Hyperkalemia 2022    Hypertensive emergency 2022    Sickle cell trait 2022    Type 2 diabetes mellitus        Past Surgical History:   Procedure Laterality Date     SECTION      x 3    COLONOSCOPY      COLONOSCOPY N/A 2022    Procedure: COLONOSCOPY;  Surgeon: Jagdeep Cedeno MD;  Location[facility];  Service: Endoscopy;  Laterality: N/A;    ESOPHAGOGASTRODUODENOSCOPY N/A 10/18/2019    Procedure: ESOPHAGOGASTRODUODENOSCOPY (EGD);  Surgeon: Gianluca Mendez MD;  Location: [facility];  Service: Endoscopy;  Laterality: N/A;    ESOPHAGOGASTRODUODENOSCOPY  N/A 08/24/2022    Procedure: EGD (ESOPHAGOGASTRODUODENOSCOPY);  Surgeon: Micky Paredes III, MD;  Location: [facility] ENDO;  Service: Endoscopy;  Laterality: N/A;    ESOPHAGOGASTRODUODENOSCOPY N/A 12/5/2022    Procedure: EGD (ESOPHAGOGASTRODUODENOSCOPY);  Surgeon: Marcelo Zhong MD;  Location: [facility] ENDO;  Service: Endoscopy;  Laterality: N/A;    LAPAROSCOPIC CHOLECYSTECTOMY N/A 07/30/2022    Procedure: CHOLECYSTECTOMY, LAPAROSCOPIC;  Surgeon: Grey Perez MD;  Location: [facility] OR;  Service: General;  Laterality: N/A;    PLACEMENT OF DUAL-LUMEN VASCULAR CATHETER Left 07/12/2022    Procedure: INSERTION-CATHETER-JOSEPH;  Surgeon: Dionte Gan MD;  Location: [facility] OR;  Service: General;  Laterality: Left;    PLACEMENT OF DUAL-LUMEN VASCULAR CATHETER Right 07/26/2022    Procedure: INSERTION-CATHETER-Hemosplit;  Surgeon: Dionte Gan MD;  Location: [facility] OR;  Service: General;  Laterality: Right;       Review of patient's allergies indicates:   Allergen Reactions    Penicillins Hives       No current facility-administered medications on file prior to encounter.     Current Outpatient Medications on File Prior to Encounter   Medication Sig    albuterol (PROVENTIL/VENTOLIN HFA) 90 mcg/actuation inhaler Inhale 2 puffs into the lungs every 6 (six) hours as needed for Wheezing. Rescue    amLODIPine (NORVASC) 10 MG tablet Take 1 tablet (10 mg total) by mouth once daily.    apixaban (ELIQUIS) 5 mg Tab Take 1 tablet (5 mg total) by mouth 2 (two) times daily.    carvediloL (COREG) 25 MG tablet Take 1 tablet (25 mg total) by mouth 2 (two) times daily.    cloNIDine 0.2 mg/24 hr td ptwk (CATAPRES) 0.2 mg/24 hr Place 1 patch onto the skin every 7 days.    dicyclomine (BENTYL) 10 MG capsule Take 1 capsule (10 mg total) by mouth 3 (three) times daily.    ferrous sulfate 325 (65 FE) MG EC tablet Take 325 mg by mouth once daily.    fluconazole (DIFLUCAN) 150 MG Tab 1 tablet Orally once for 1 day(s)    FLUoxetine 20  "MG capsule Take 1 capsule (20 mg total) by mouth once daily.    furosemide (LASIX) 40 MG tablet Take 40 mg by mouth 2 (two) times a day.    gabapentin (NEURONTIN) 100 MG capsule Take 1 capsule (100 mg total) by mouth 2 (two) times daily.    hydrALAZINE (APRESOLINE) 100 MG tablet Take 1 tablet (100 mg total) by mouth every 8 (eight) hours.    insulin aspart U-100 (NOVOLOG) 100 unit/mL (3 mL) InPn pen Inject 1-10 Units into the skin before meals and at bedtime as needed (Hyperglycemia). Max daily dose 40 units.    insulin detemir U-100 (LEVEMIR FLEXTOUCH) 100 unit/mL (3 mL) SubQ InPn pen Inject 9 Units into the skin once daily.    insulin glargine 100 units/mL SubQ pen 28u Subcutaneous once a day for 30 days    insulin lispro 100 unit/mL pen 8u Subcutaneous TID with meals for 90 days    isosorbide mononitrate (IMDUR) 60 MG 24 hr tablet Take 2 tablets (120 mg total) by mouth once daily.    levETIRAcetam (KEPPRA) 500 MG Tab Take 1 tablet (500 mg total) by mouth 2 (two) times daily.    losartan-hydrochlorothiazide 100-12.5 mg (HYZAAR) 100-12.5 mg Tab 1 tablet Orally Once a day for 30 day(s)    mirtazapine (REMERON SOL-TAB) 15 MG disintegrating tablet Take 1 tablet (15 mg total) by mouth nightly.    ondansetron (ZOFRAN) 4 MG tablet Take 1 tablet (4 mg total) by mouth every 8 (eight) hours as needed for Nausea.    pantoprazole (PROTONIX) 40 MG tablet Take 40 mg by mouth once daily.    pen needle, diabetic (BD ULTRA-FINE MAGALIE PEN NEEDLE) 32 gauge x 5/32" Ndle Inject into the skin 5 times per day with insulin    promethazine (PHENERGAN) 25 MG tablet Take 1 tablet (25 mg total) by mouth every 12 (twelve) hours as needed (nausea/vomiting not relieved with ondansetron (zofran)).    [DISCONTINUED] atenoloL (TENORMIN) 50 MG tablet Take 1 tablet (50 mg total) by mouth every other day.    [DISCONTINUED] omeprazole (PRILOSEC) 20 MG capsule Take 2 capsules (40 mg total) by mouth once daily. for 10 days    " [DISCONTINUED] torsemide (DEMADEX) 20 MG Tab Take 1 tablet (20 mg total) by mouth 2 (two) times a day. (Patient taking differently: Take 20 mg by mouth once daily.)     Family History       Problem Relation (Age of Onset)    Diabetes Mother, Father          Tobacco Use    Smoking status: Never    Smokeless tobacco: Never   Substance and Sexual Activity    Alcohol use: Not Currently    Drug use: No    Sexual activity: Not Currently     Partners: Male     Birth control/protection: I.U.D.     Review of Systems   Constitutional:  Negative for activity change and appetite change.   HENT:  Negative for congestion and dental problem.    Eyes:  Negative for discharge and itching.   Respiratory:  Negative for shortness of breath.    Cardiovascular:  Negative for chest pain.   Gastrointestinal:  Positive for abdominal pain. Negative for abdominal distention.   Endocrine: Negative for cold intolerance.   Genitourinary:  Negative for difficulty urinating and dysuria.   Musculoskeletal:  Negative for arthralgias and back pain.   Skin:  Negative for color change.   Neurological:  Negative for dizziness and facial asymmetry.   Hematological:  Negative for adenopathy.   Psychiatric/Behavioral:  Negative for agitation and behavioral problems.    Objective:     Vital Signs (Most Recent):  Temp: 98.4 °F (36.9 °C) (01/12/23 1101)  Pulse: 97 (01/12/23 1451)  Resp: 20 (01/12/23 1245)  BP: (!) 173/106 (01/12/23 1553)  SpO2: 95 % (01/12/23 1451)   Vital Signs (24h Range):  Temp:  [98.4 °F (36.9 °C)] 98.4 °F (36.9 °C)  Pulse:  [] 97  Resp:  [20] 20  SpO2:  [75 %-99 %] 95 %  BP: (135-173)/() 173/106        There is no height or weight on file to calculate BMI.    Physical Exam  Vitals and nursing note reviewed.   Constitutional:       General: She is not in acute distress.  HENT:      Head: Atraumatic.      Right Ear: External ear normal.      Left Ear: External ear normal.      Nose: Nose normal.      Mouth/Throat:       Mouth: Mucous membranes are moist.   Cardiovascular:      Rate and Rhythm: Normal rate.   Pulmonary:      Effort: Pulmonary effort is normal.      Breath sounds: Rales present.   Musculoskeletal:         General: Normal range of motion.      Cervical back: Normal range of motion.   Skin:     General: Skin is warm.   Neurological:      Mental Status: She is alert and oriented to person, place, and time.   Psychiatric:         Behavior: Behavior normal.           Significant Labs: All pertinent labs within the past 24 hours have been reviewed.  CBC:   Recent Labs   Lab 01/12/23  1318   WBC 8.38   HGB 8.5*   HCT 24.7*   PLT 33*     CMP:   Recent Labs   Lab 01/12/23  1219   *   K 4.1   CL 92*   CO2 27   *   BUN 42*   CREATININE 4.7*   CALCIUM 8.6*   PROT 7.1   ALBUMIN 3.5   BILITOT 1.7*   ALKPHOS 215*   AST 23   ALT 31   ANIONGAP 11       Significant Imaging: I have reviewed all pertinent imaging results/findings within the past 24 hours.    Assessment/Plan:     * Abdominal pain  Pt on bentyl for chronic abdominal pain  Today X ray showed constipation and lactulose was prescribed  High chance for gastroparesis but unable to give reglan because pt suffers from seizure       Pericardial effusion  Large pericardial effusion as per CT abdomen done 1 week ago  Stat ECHO  Cardiology consult      Hyperglycemia  Maintain insulin gtt       Anemia of chronic kidney failure, stage 5  Chronic issue       Thrombocytopenia  Chronic issue  Monitor      Deep vein thrombosis (DVT) of non-extremity vein  Maintain NOAC       ESRD (end stage renal disease) on dialysis  TTS  HD today      Seizure disorder  Continue keppra       Sickle cell trait  Aware       Gastroparesis - suspected, unconfirmed  Unable to order reglan because pt has seizure disorder  Seizure frequency and trigger increased with reglan administration       Type 2 diabetes mellitus with chronic kidney disease on chronic dialysis, with long-term current use of  insulin  Uncontrolled  Maintain Insulin regime as ordered  Presently on insulin gtt for hyperglycemia      VTE Risk Mitigation (From admission, onward)         Ordered     apixaban tablet 5 mg  2 times daily         01/12/23 1556     IP VTE HIGH RISK PATIENT  Once         01/12/23 1556     Place sequential compression device  Until discontinued         01/12/23 1556                   Alfonso Garsia MD  Department of Hospital Medicine   [facility] - Emergency Dept

## 2023-01-12 NOTE — ASSESSMENT & PLAN NOTE
Pt on bentyl for chronic abdominal pain  Today X ray showed constipation and lactulose was prescribed  High chance for gastroparesis but unable to give reglan because pt suffers from seizure

## 2023-01-12 NOTE — ASSESSMENT & PLAN NOTE
Unable to order reglan because pt has seizure disorder  Seizure frequency and trigger increased with reglan administration

## 2023-01-13 VITALS
SYSTOLIC BLOOD PRESSURE: 133 MMHG | HEART RATE: 87 BPM | OXYGEN SATURATION: 100 % | RESPIRATION RATE: 15 BRPM | BODY MASS INDEX: 21.42 KG/M2 | DIASTOLIC BLOOD PRESSURE: 82 MMHG | HEIGHT: 62 IN | WEIGHT: 116.38 LBS | TEMPERATURE: 99 F

## 2023-01-13 PROBLEM — R10.9 ABDOMINAL PAIN: Status: RESOLVED | Noted: 2023-01-12 | Resolved: 2023-01-13

## 2023-01-13 PROBLEM — R73.9 HYPERGLYCEMIA: Status: RESOLVED | Noted: 2022-10-26 | Resolved: 2023-01-13

## 2023-01-13 LAB
ALBUMIN SERPL BCP-MCNC: 3.1 G/DL (ref 3.5–5.2)
ALP SERPL-CCNC: 192 U/L (ref 55–135)
ALT SERPL W/O P-5'-P-CCNC: 27 U/L (ref 10–44)
ANION GAP SERPL CALC-SCNC: 8 MMOL/L (ref 8–16)
AST SERPL-CCNC: 20 U/L (ref 10–40)
BILIRUB SERPL-MCNC: 1.2 MG/DL (ref 0.1–1)
BUN SERPL-MCNC: 24 MG/DL (ref 6–20)
CALCIUM SERPL-MCNC: 9.1 MG/DL (ref 8.7–10.5)
CHLORIDE SERPL-SCNC: 106 MMOL/L (ref 95–110)
CO2 SERPL-SCNC: 24 MMOL/L (ref 23–29)
CREAT SERPL-MCNC: 3.2 MG/DL (ref 0.5–1.4)
ERYTHROCYTE [DISTWIDTH] IN BLOOD BY AUTOMATED COUNT: 14.9 % (ref 11.5–14.5)
EST. GFR  (NO RACE VARIABLE): 18.8 ML/MIN/1.73 M^2
GLUCOSE SERPL-MCNC: 185 MG/DL (ref 70–110)
GLUCOSE SERPL-MCNC: 188 MG/DL (ref 70–110)
GLUCOSE SERPL-MCNC: 198 MG/DL (ref 70–110)
GLUCOSE SERPL-MCNC: 201 MG/DL (ref 70–110)
GLUCOSE SERPL-MCNC: 202 MG/DL (ref 70–110)
GLUCOSE SERPL-MCNC: 211 MG/DL (ref 70–110)
GLUCOSE SERPL-MCNC: 212 MG/DL (ref 70–110)
GLUCOSE SERPL-MCNC: 213 MG/DL (ref 70–110)
GLUCOSE SERPL-MCNC: 218 MG/DL (ref 70–110)
GLUCOSE SERPL-MCNC: 233 MG/DL (ref 70–110)
HCT VFR BLD AUTO: 24.6 % (ref 37–48.5)
HGB BLD-MCNC: 8.4 G/DL (ref 12–16)
MCH RBC QN AUTO: 27.7 PG (ref 27–31)
MCHC RBC AUTO-ENTMCNC: 34.1 G/DL (ref 32–36)
MCV RBC AUTO: 81 FL (ref 82–98)
PLATELET # BLD AUTO: 54 K/UL (ref 150–450)
PLATELET BLD QL SMEAR: ABNORMAL
PMV BLD AUTO: 10.6 FL (ref 9.2–12.9)
POTASSIUM SERPL-SCNC: 4.4 MMOL/L (ref 3.5–5.1)
PROT SERPL-MCNC: 6.9 G/DL (ref 6–8.4)
RBC # BLD AUTO: 3.03 M/UL (ref 4–5.4)
SODIUM SERPL-SCNC: 138 MMOL/L (ref 136–145)
WBC # BLD AUTO: 9.31 K/UL (ref 3.9–12.7)

## 2023-01-13 PROCEDURE — G0378 HOSPITAL OBSERVATION PER HR: HCPCS

## 2023-01-13 PROCEDURE — 96372 THER/PROPH/DIAG INJ SC/IM: CPT | Performed by: INTERNAL MEDICINE

## 2023-01-13 PROCEDURE — 82962 GLUCOSE BLOOD TEST: CPT

## 2023-01-13 PROCEDURE — 25000003 PHARM REV CODE 250: Performed by: INTERNAL MEDICINE

## 2023-01-13 PROCEDURE — 96366 THER/PROPH/DIAG IV INF ADDON: CPT

## 2023-01-13 PROCEDURE — 63600175 PHARM REV CODE 636 W HCPCS: Performed by: INTERNAL MEDICINE

## 2023-01-13 PROCEDURE — 96376 TX/PRO/DX INJ SAME DRUG ADON: CPT | Mod: 59

## 2023-01-13 PROCEDURE — 36415 COLL VENOUS BLD VENIPUNCTURE: CPT | Performed by: INTERNAL MEDICINE

## 2023-01-13 PROCEDURE — 80053 COMPREHEN METABOLIC PANEL: CPT | Performed by: INTERNAL MEDICINE

## 2023-01-13 PROCEDURE — 85027 COMPLETE CBC AUTOMATED: CPT | Performed by: INTERNAL MEDICINE

## 2023-01-13 RX ORDER — ONDANSETRON 2 MG/ML
4 INJECTION INTRAMUSCULAR; INTRAVENOUS ONCE
Status: COMPLETED | OUTPATIENT
Start: 2023-01-13 | End: 2023-01-13

## 2023-01-13 RX ORDER — HYDROMORPHONE HYDROCHLORIDE 4 MG/1
4 TABLET ORAL EVERY 12 HOURS PRN
Qty: 10 TABLET | Refills: 0 | Status: ON HOLD | OUTPATIENT
Start: 2023-01-13 | End: 2023-01-25 | Stop reason: HOSPADM

## 2023-01-13 RX ORDER — MUPIROCIN 20 MG/G
OINTMENT TOPICAL 2 TIMES DAILY
Status: DISCONTINUED | OUTPATIENT
Start: 2023-01-13 | End: 2023-01-13 | Stop reason: HOSPADM

## 2023-01-13 RX ORDER — CHLORHEXIDINE GLUCONATE ORAL RINSE 1.2 MG/ML
15 SOLUTION DENTAL 2 TIMES DAILY
Status: DISCONTINUED | OUTPATIENT
Start: 2023-01-13 | End: 2023-01-13 | Stop reason: HOSPADM

## 2023-01-13 RX ORDER — HYDROMORPHONE HYDROCHLORIDE 1 MG/ML
0.5 INJECTION, SOLUTION INTRAMUSCULAR; INTRAVENOUS; SUBCUTANEOUS EVERY 6 HOURS PRN
Status: DISCONTINUED | OUTPATIENT
Start: 2023-01-13 | End: 2023-01-13 | Stop reason: HOSPADM

## 2023-01-13 RX ADMIN — HYDROMORPHONE HYDROCHLORIDE 0.5 MG: 1 INJECTION, SOLUTION INTRAMUSCULAR; INTRAVENOUS; SUBCUTANEOUS at 11:01

## 2023-01-13 RX ADMIN — GABAPENTIN 100 MG: 100 CAPSULE ORAL at 08:01

## 2023-01-13 RX ADMIN — CLONIDINE 1 PATCH: 0.2 PATCH TRANSDERMAL at 08:01

## 2023-01-13 RX ADMIN — MUPIROCIN 1 G: 20 OINTMENT TOPICAL at 08:01

## 2023-01-13 RX ADMIN — HYDRALAZINE HYDROCHLORIDE 100 MG: 25 TABLET ORAL at 05:01

## 2023-01-13 RX ADMIN — ISOSORBIDE MONONITRATE 120 MG: 60 TABLET, EXTENDED RELEASE ORAL at 08:01

## 2023-01-13 RX ADMIN — ACETAMINOPHEN 650 MG: 325 TABLET ORAL at 08:01

## 2023-01-13 RX ADMIN — ONDANSETRON 4 MG: 2 INJECTION INTRAMUSCULAR; INTRAVENOUS at 11:01

## 2023-01-13 RX ADMIN — INSULIN DETEMIR 10 UNITS: 100 INJECTION, SOLUTION SUBCUTANEOUS at 08:01

## 2023-01-13 RX ADMIN — PANTOPRAZOLE SODIUM 40 MG: 40 TABLET, DELAYED RELEASE ORAL at 05:01

## 2023-01-13 RX ADMIN — CHLORHEXIDINE GLUCONATE 15 ML: 1.2 RINSE ORAL at 08:01

## 2023-01-13 RX ADMIN — DICYCLOMINE HYDROCHLORIDE 10 MG: 10 CAPSULE ORAL at 08:01

## 2023-01-13 RX ADMIN — INSULIN ASPART 5 UNITS: 100 INJECTION, SOLUTION INTRAVENOUS; SUBCUTANEOUS at 07:01

## 2023-01-13 RX ADMIN — LEVETIRACETAM 500 MG: 500 TABLET, FILM COATED ORAL at 08:01

## 2023-01-13 RX ADMIN — HYDROMORPHONE HYDROCHLORIDE 0.5 MG: 1 INJECTION, SOLUTION INTRAMUSCULAR; INTRAVENOUS; SUBCUTANEOUS at 06:01

## 2023-01-13 RX ADMIN — FUROSEMIDE 40 MG: 40 TABLET ORAL at 08:01

## 2023-01-13 RX ADMIN — HYDROMORPHONE HYDROCHLORIDE 0.5 MG: 1 INJECTION, SOLUTION INTRAMUSCULAR; INTRAVENOUS; SUBCUTANEOUS at 02:01

## 2023-01-13 NOTE — PLAN OF CARE
Patient cleared for discharge from case management standpoint.    Chart and discharge orders reviewed.  Patient discharged home with no further case management needs.       01/13/23 1151   Final Note   Assessment Type Final Discharge Note   Anticipated Discharge Disposition Home   What phone number can be called within the next 1-3 days to see how you are doing after discharge? [phone]   Post-Acute Status   Discharge Delays None known at this time

## 2023-01-13 NOTE — NURSING
Attempted to call father for transportation/discharge home. Voicemail left with update and instructions to call this writer.

## 2023-01-13 NOTE — PROGRESS NOTES
01/12/23 2220   Handoff Report   Received From SHAILA Helton   Given To SHAILA Juarez   Vital Signs   Temp 98.9 °F (37.2 °C)   Pulse 110   SpO2 98 %   BP (!) 160/88   MAP (mmHg) 116   Post-Hemodialysis Assessment   Rinseback Volume (mL) 250 mL   Blood Volume Processed (Liters) 62.2 L   Dialyzer Clearance Lightly streaked   Duration of Treatment 180 minutes   Additional Fluid Intake (mL) 500 mL   Total UF (mL) 4500 mL   Net Fluid Removal 4000   Patient Response to Treatment tolerated well   Post-Treatment Weight 55.9 kg (123 lb 3.8 oz)   Treatment Weight Change -4   Post-Hemodialysis Comments tx completed, reingused, no blood loss noted

## 2023-01-13 NOTE — NURSING
Pt resting quietly. Dinner served- pt decllined at present time.No answer on voicemail provided. Father stated earlier via telephone that family would be here to provide transportation home after pt's daughter was picked up

## 2023-01-13 NOTE — PLAN OF CARE
[facility]  Initial Discharge Assessment       Primary Care Provider: [facility]    Admission Diagnosis: Pericardial effusion [I31.39]    Admission Date: 1/12/2023  Expected Discharge Date: 1/13/2023         Payor: MEDICAID / Plan: [place] / Product Type: Managed Medicaid /     Extended Emergency Contact Information  Primary Emergency Contact: Garcia Howard  Mobile Phone: [phone]  Relation: Father  Preferred language: English   needed? No  Secondary Emergency Contact: Tanya Howard  Mobile Phone: [phone]  Relation: Step parent  Preferred language: English   needed? No    Discharge Plan A: Home with family  Discharge Plan B: Home with family      [facility] [address] [facility] [place] - [address]  [address]  [place] [address]  Phone: [phone] Fax: [phone]    [id number] [address] AT [facility]  [address]  [facility] [address]  Phone: [phone] Fax: [phone]      Initial Assessment (most recent)       Adult Discharge Assessment - 01/13/23 1149          Discharge Assessment    Assessment Type Discharge Planning Assessment     Source of Information health record     People in Home parent(s)     Facility Arrived From: home     Equipment Currently Used at Home walker, rolling     Who is going to help you get home at discharge? Father     How do you get to doctors appointments? family or friend will provide     Are you on dialysis? Yes     Dialysis Name and Scheduled days Patient states dialysis chair time at [facility] on [address]     Do you take coumadin? No     Discharge Plan A Home with family     Discharge Plan B Home with family     DME Needed Upon Discharge  none

## 2023-01-13 NOTE — CONSULTS
INPATIENT NEPHROLOGY CONSULT   [facility]    Patient Name: Tabby Howard  Date: 01/13/2023    Reason for consultation: ESRD    Chief Complaint:   Chief Complaint   Patient presents with    Abdominal Pain       History of Present Illness:  33-year-old female presents with nausea, vomiting, and abdominal pain- similar to prior presentations- acute onset.  History CHF, seizure disorder, ESRD (TTS dialysis), hypertension, DMI and gastroparesis. There are no exac/reliev factors. Consulted for dialysis.     Interval History:  1/13- VSS, on 4L NC, no issues with HD yest- got off 4L    Echo:  The left ventricle is normal in size with moderate concentric hypertrophy and normal systolic function.  The estimated ejection fraction is 55%.  Grade III left ventricular diastolic dysfunction.  Normal right ventricular size with normal right ventricular systolic function.  Moderate left atrial enlargement.  Moderate to severe tricuspid regurgitation.  Mild to moderate pulmonic regurgitation.  Mild mitral regurgitation.  Normal central venous pressure (3 mmHg).  The estimated PA systolic pressure is 56 mmHg.  There is pulmonary hypertension.  Moderate to large circumferential pericardial effusion. No evidence of tamponade.    Plan of Care:    Assessment:  ESRD on HD TTS  HTN/D CHF/Known pericardial effusion  SHPT  Anemia of CKD  Thrombocytopenia    Plan:    - ordered HD TTS  - continue home BP meds- UF 3-4L- renal diet with 1.5L fluid restriction  - f/u cards recs regarding pericardial effusion  - adjust dialysate  - no acute binder needs  - ordered SHELLEY with HD  - hold heparin with HD    Thank you for allowing us to participate in this patient's care. We will continue to follow.    Vital Signs:  Temp Readings from Last 3 Encounters:   01/13/23 98.4 °F (36.9 °C) (Oral)   01/07/23 98.7 °F (37.1 °C) (Oral)   01/07/23 98 °F (36.7 °C) (Oral)       Pulse Readings from Last 3 Encounters:   01/13/23 85   01/08/23 87    23 82       BP Readings from Last 3 Encounters:   23 103/66   23 118/68   23 (!) 144/92       Weight:  Wt Readings from Last 3 Encounters:   23 52.8 kg (116 lb 6.5 oz)   23 59.9 kg (132 lb)   23 59.9 kg (132 lb)       Past Medical & Surgical History:  Past Medical History:   Diagnosis Date    CKD (chronic kidney disease), stage IV 2022    Diabetes mellitus due to underlying condition with unspecified complications 2022    Gastroparesis 2022    Heart failure with preserved ejection fraction 2022    EF 55% on 3/22    History of gastroesophageal reflux (GERD)     History of supraventricular tachycardia     Hyperkalemia 2022    Hypertensive emergency 2022    Sickle cell trait 2022    Type 2 diabetes mellitus        Past Surgical History:   Procedure Laterality Date     SECTION      x 3    COLONOSCOPY      COLONOSCOPY N/A 2022    Procedure: COLONOSCOPY;  Surgeon: Jagdeep Cedeno MD;  Location: [facility];  Service: Endoscopy;  Laterality: N/A;    ESOPHAGOGASTRODUODENOSCOPY N/A 10/18/2019    Procedure: ESOPHAGOGASTRODUODENOSCOPY (EGD);  Surgeon: Gianluca Mendez MD;  Location: [facility];  Service: Endoscopy;  Laterality: N/A;    ESOPHAGOGASTRODUODENOSCOPY N/A 2022    Procedure: EGD (ESOPHAGOGASTRODUODENOSCOPY);  Surgeon: Micky Paredes III, MD;  Location: [facility];  Service: Endoscopy;  Laterality: N/A;    ESOPHAGOGASTRODUODENOSCOPY N/A 2022    Procedure: EGD (ESOPHAGOGASTRODUODENOSCOPY);  Surgeon: Marcelo Zhong MD;  Location: [facility];  Service: Endoscopy;  Laterality: N/A;    LAPAROSCOPIC CHOLECYSTECTOMY N/A 2022    Procedure: CHOLECYSTECTOMY, LAPAROSCOPIC;  Surgeon: Grey Perez MD;  Location: [facility];  Service: General;  Laterality: N/A;    PLACEMENT OF DUAL-LUMEN VASCULAR CATHETER Left 2022    Procedure: INSERTION-CATHETER-JOSEPH;  Surgeon: Dionte Gan MD;  Location: [facility];  Service:  General;  Laterality: Left;    PLACEMENT OF DUAL-LUMEN VASCULAR CATHETER Right 07/26/2022    Procedure: INSERTION-CATHETER-Hemosplit;  Surgeon: Dionte Gan MD;  Location: [facility];  Service: General;  Laterality: Right;       Past Social History:  Social History     Socioeconomic History    Marital status:    Tobacco Use    Smoking status: Never    Smokeless tobacco: Never   Substance and Sexual Activity    Alcohol use: Not Currently    Drug use: No    Sexual activity: Not Currently     Partners: Male     Birth control/protection: I.U.D.     Social Determinants of Health     Financial Resource Strain: Unknown    Difficulty of Paying Living Expenses: Patient refused   Food Insecurity: Unknown    Worried About Running Out of Food in the Last Year: Patient refused    Ran Out of Food in the Last Year: Patient refused   Transportation Needs: Unmet Transportation Needs    Lack of Transportation (Medical): Yes    Lack of Transportation (Non-Medical): Yes   Physical Activity: Inactive    Days of Exercise per Week: 0 days    Minutes of Exercise per Session: 0 min   Stress: Unknown    Feeling of Stress : Patient refused   Social Connections: Unknown    Frequency of Communication with Friends and Family: More than three times a week    Frequency of Social Gatherings with Friends and Family: More than three times a week    Attends Anabaptism Services: Patient refused    Active Member of Clubs or Organizations: Patient refused    Attends Club or Organization Meetings: Patient refused    Marital Status: Patient refused   Housing Stability: High Risk    Unable to Pay for Housing in the Last Year: Patient refused    Unstable Housing in the Last Year: Yes       Medications:  Scheduled Meds:   chlorhexidine  15 mL Mouth/Throat BID    cloNIDine 0.2 mg/24 hr td ptwk  1 patch Transdermal Q7 Days    dicyclomine  10 mg Oral TID    epoetin teresa-ebpx (RETACRIT) injection  10,000 Units Intravenous Every Tues, Thurs, Sat     furosemide  40 mg Oral BID    gabapentin  100 mg Oral BID    hydrALAZINE  100 mg Oral Q8H    insulin aspart U-100  5 Units Subcutaneous TIDWM    insulin detemir U-100  10 Units Subcutaneous Daily    isosorbide mononitrate  120 mg Oral Daily    lactulose  30 g Oral TID    levETIRAcetam  500 mg Oral BID    mirtazapine  7.5 mg Oral QHS    mupirocin   Nasal BID    ondansetron  4 mg Intravenous Once    pantoprazole  40 mg Oral Daily     Continuous Infusions:  PRN Meds:.acetaminophen, dextrose 10%, dextrose 10%, glucagon (human recombinant), glucose, glucose, HYDROmorphone, insulin aspart U-100, labetalol  No current facility-administered medications on file prior to encounter.     Current Outpatient Medications on File Prior to Encounter   Medication Sig Dispense Refill    albuterol (PROVENTIL/VENTOLIN HFA) 90 mcg/actuation inhaler Inhale 2 puffs into the lungs every 6 (six) hours as needed for Wheezing. Rescue 18 g 3    amLODIPine (NORVASC) 10 MG tablet Take 1 tablet (10 mg total) by mouth once daily. 30 tablet 11    carvediloL (COREG) 25 MG tablet Take 1 tablet (25 mg total) by mouth 2 (two) times daily. 60 tablet 2    cloNIDine 0.2 mg/24 hr td ptwk (CATAPRES) 0.2 mg/24 hr Place 1 patch onto the skin every 7 days. 4 patch 2    dicyclomine (BENTYL) 10 MG capsule Take 1 capsule (10 mg total) by mouth 3 (three) times daily. 90 capsule 0    ferrous sulfate 325 (65 FE) MG EC tablet Take 325 mg by mouth once daily.      fluconazole (DIFLUCAN) 150 MG Tab Take 150 mg by mouth once daily.      FLUoxetine 20 MG capsule Take 1 capsule (20 mg total) by mouth once daily. 30 capsule 11    furosemide (LASIX) 40 MG tablet Take 40 mg by mouth 2 (two) times a day.      gabapentin (NEURONTIN) 100 MG capsule Take 1 capsule (100 mg total) by mouth 2 (two) times daily. 90 capsule 2    hydrALAZINE (APRESOLINE) 100 MG tablet Take 1 tablet (100 mg total) by mouth every 8 (eight) hours. 90 tablet 2    insulin aspart U-100 (NOVOLOG) 100 unit/mL  "(3 mL) InPn pen Inject 1-10 Units into the skin before meals and at bedtime as needed (Hyperglycemia). Max daily dose 40 units. 3 mL 6    insulin detemir U-100 (LEVEMIR FLEXTOUCH) 100 unit/mL (3 mL) SubQ InPn pen Inject 9 Units into the skin once daily. 6 mL 2    insulin glargine 100 units/mL SubQ pen Inject 28 Units into the skin once daily.      insulin lispro 100 unit/mL pen Inject 8 Units into the skin 3 (three) times daily with meals.      isosorbide mononitrate (IMDUR) 60 MG 24 hr tablet Take 2 tablets (120 mg total) by mouth once daily. 30 tablet 11    levETIRAcetam (KEPPRA) 500 MG Tab Take 1 tablet (500 mg total) by mouth 2 (two) times daily. 60 tablet 11    losartan-hydrochlorothiazide 100-12.5 mg (HYZAAR) 100-12.5 mg Tab Take 1 tablet by mouth once daily.      mirtazapine (REMERON SOL-TAB) 15 MG disintegrating tablet Take 1 tablet (15 mg total) by mouth nightly. 90 tablet 0    ondansetron (ZOFRAN) 4 MG tablet Take 1 tablet (4 mg total) by mouth every 8 (eight) hours as needed for Nausea. 60 tablet 2    pantoprazole (PROTONIX) 40 MG tablet Take 40 mg by mouth once daily.      pen needle, diabetic (BD ULTRA-FINE MAGALIE PEN NEEDLE) 32 gauge x 5/32" Ndle Inject into the skin 5 times per day with insulin 100 each 12    promethazine (PHENERGAN) 25 MG tablet Take 1 tablet (25 mg total) by mouth every 12 (twelve) hours as needed (nausea/vomiting not relieved with ondansetron (zofran)). 60 tablet 0    [DISCONTINUED] apixaban (ELIQUIS) 5 mg Tab Take 1 tablet (5 mg total) by mouth 2 (two) times daily. 60 tablet 2    [DISCONTINUED] atenoloL (TENORMIN) 50 MG tablet Take 1 tablet (50 mg total) by mouth every other day. 45 tablet 3    [DISCONTINUED] omeprazole (PRILOSEC) 20 MG capsule Take 2 capsules (40 mg total) by mouth once daily. for 10 days 20 capsule 0    [DISCONTINUED] torsemide (DEMADEX) 20 MG Tab Take 1 tablet (20 mg total) by mouth 2 (two) times a day. (Patient taking differently: Take 20 mg by mouth once " daily.) 60 tablet 1       Allergies:  Penicillins    Past Family History:  Reviewed; refer to Hospitalist Admission Note    Review of Systems:  Review of Systems - All 14 systems reviewed and negative, except as noted in HPI    Physical Exam:  General Appearance:    Ill, hurts all over, AAO x 3, cooperative, appears stated age   Head:    Normocephalic, atraumatic   Eyes:    PER, EOMI, and conjunctiva/sclera clear bilaterally       Mouth:   Moist mucus membranes, no thrush or oral lesions,       normal dentition   Back:     No CVA tenderness   Lungs:     Clear to auscultation bilaterally, no wheezes, crackles,           rales or rhonchi, symmetric air movement, respirations unlabored   Chest wall:    No tenderness or deformity   Heart:    Regular rate and rhythm, S1 and S2 normal, no murmur, rub   or gallop   Abdomen:     Soft, non-tender, non-distended, bowel sounds active all four   quadrants, no RT or guarding, no masses, no organomegaly   Extremities:   Warm and well perfused, distal pulses are intact, no             cyanosis or peripheral edema   MSK:   No joint or muscle swelling, tenderness or deformity   Skin:   Skin color, texture, turgor normal, no rashes or lesions   Neurologic/Psychiatric:   CNII-XII intact, normal strength and sensation       throughout, no asterixis; normal affect, memory, judgement     and insight      Results:  Lab Results   Component Value Date     01/13/2023    K 4.4 01/13/2023     01/13/2023    CO2 24 01/13/2023    BUN 24 (H) 01/13/2023    CREATININE 3.2 (H) 01/13/2023    CALCIUM 9.1 01/13/2023    ANIONGAP 8 01/13/2023    ESTGFRAFRICA 20 (A) 07/31/2022    EGFRNONAA 18 (A) 07/31/2022       Lab Results   Component Value Date    CALCIUM 9.1 01/13/2023    PHOS 3.2 12/14/2022       Recent Labs   Lab 01/13/23  0321   WBC 9.31   RBC 3.03*   HGB 8.4*   HCT 24.6*   PLT 54*   MCV 81*   MCH 27.7   MCHC 34.1         I have personally reviewed pertinent radiological imaging and  reports.    I have spent > 70 minutes providing care for this patient for the above diagnoses. These services have included chart/data/imaging review, evaluation, exam, formulation of plan, , note preparation, and discussions with staff involved in this patient's care.    Prateek Clark MD MPH  [facility]  [address] [place] [id number]  [phone] (p)  [phone] (f)

## 2023-01-13 NOTE — CONSULTS
INPATIENT NEPHROLOGY CONSULT   [facility]    Patient Name: Tabby Howard  Date: 01/13/2023    Reason for consultation: ESRD    Chief Complaint:   Chief Complaint   Patient presents with    Abdominal Pain       History of Present Illness:  33-year-old female presents with nausea, vomiting, and abdominal pain- similar to prior presentations- acute onset.  History CHF, seizure disorder, ESRD (TTS dialysis), hypertension, DMI and gastroparesis. There are no exac/reliev factors. Consulted for dialysis.     Interval History:  1/13- VSS, on 4L NC, no issues with HD yest- got off 4L    Plan of Care:    Assessment:  ESRD on HD TTS  HTN/D CHF  SHPT  Anemia of CKD  Thrombocytopenia    Plan:    - ordered HD TTS  - continue home BP meds- UF 3-4L- renal diet with 1.5L fluid restriction  - adjust dialysate  - no acute binder needs  - ordered SHELLEY with HD  - hold heparin with HD    Thank you for allowing us to participate in this patient's care. We will continue to follow.    Vital Signs:  Temp Readings from Last 3 Encounters:   01/13/23 100 °F (37.8 °C)   01/07/23 98.7 °F (37.1 °C) (Oral)   01/07/23 98 °F (36.7 °C) (Oral)       Pulse Readings from Last 3 Encounters:   01/13/23 94   01/08/23 87   01/07/23 82       BP Readings from Last 3 Encounters:   01/13/23 (!) 146/91   01/08/23 118/68   01/07/23 (!) 144/92       Weight:  Wt Readings from Last 3 Encounters:   01/13/23 52.8 kg (116 lb 6.5 oz)   01/12/23 59.9 kg (132 lb)   01/07/23 59.9 kg (132 lb)       Past Medical & Surgical History:  Past Medical History:   Diagnosis Date    CKD (chronic kidney disease), stage IV 4/12/2022    Diabetes mellitus due to underlying condition with unspecified complications 4/12/2022    Gastroparesis 4/12/2022    Heart failure with preserved ejection fraction 4/12/2022    EF 55% on 3/22    History of gastroesophageal reflux (GERD)     History of supraventricular tachycardia     Hyperkalemia 7/7/2022    Hypertensive emergency  2022    Sickle cell trait 2022    Type 2 diabetes mellitus        Past Surgical History:   Procedure Laterality Date     SECTION      x 3    COLONOSCOPY      COLONOSCOPY N/A 2022    Procedure: COLONOSCOPY;  Surgeon: Jagdeep Cedeno MD;  Location: [facility];  Service: Endoscopy;  Laterality: N/A;    ESOPHAGOGASTRODUODENOSCOPY N/A 10/18/2019    Procedure: ESOPHAGOGASTRODUODENOSCOPY (EGD);  Surgeon: Gianluca Mendez MD;  Location: [facility];  Service: Endoscopy;  Laterality: N/A;    ESOPHAGOGASTRODUODENOSCOPY N/A 2022    Procedure: EGD (ESOPHAGOGASTRODUODENOSCOPY);  Surgeon: Micky Paredes III, MD;  Location: [facility];  Service: Endoscopy;  Laterality: N/A;    ESOPHAGOGASTRODUODENOSCOPY N/A 2022    Procedure: EGD (ESOPHAGOGASTRODUODENOSCOPY);  Surgeon: Marcelo Zhong MD;  Location: [facility];  Service: Endoscopy;  Laterality: N/A;    LAPAROSCOPIC CHOLECYSTECTOMY N/A 2022    Procedure: CHOLECYSTECTOMY, LAPAROSCOPIC;  Surgeon: Grey Perez MD;  Location: [facility] OR;  Service: General;  Laterality: N/A;    PLACEMENT OF DUAL-LUMEN VASCULAR CATHETER Left 2022    Procedure: INSERTION-CATHETER-JOSEPH;  Surgeon: Dionte Gan MD;  Location: [facility] OR;  Service: General;  Laterality: Left;    PLACEMENT OF DUAL-LUMEN VASCULAR CATHETER Right 2022    Procedure: INSERTION-CATHETER-Hemosplit;  Surgeon: Dionte Gan MD;  Location: [facility];  Service: General;  Laterality: Right;       Past Social History:  Social History     Socioeconomic History    Marital status:    Tobacco Use    Smoking status: Never    Smokeless tobacco: Never   Substance and Sexual Activity    Alcohol use: Not Currently    Drug use: No    Sexual activity: Not Currently     Partners: Male     Birth control/protection: I.U.D.     Social Determinants of Health     Financial Resource Strain: Unknown    Difficulty of Paying Living Expenses: Patient refused   Food Insecurity: Unknown    Worried  About Running Out of Food in the Last Year: Patient refused    Ran Out of Food in the Last Year: Patient refused   Transportation Needs: Unmet Transportation Needs    Lack of Transportation (Medical): Yes    Lack of Transportation (Non-Medical): Yes   Physical Activity: Inactive    Days of Exercise per Week: 0 days    Minutes of Exercise per Session: 0 min   Stress: Unknown    Feeling of Stress : Patient refused   Social Connections: Unknown    Frequency of Communication with Friends and Family: More than three times a week    Frequency of Social Gatherings with Friends and Family: More than three times a week    Attends Mormonism Services: Patient refused    Active Member of Clubs or Organizations: Patient refused    Attends Club or Organization Meetings: Patient refused    Marital Status: Patient refused   Housing Stability: High Risk    Unable to Pay for Housing in the Last Year: Patient refused    Unstable Housing in the Last Year: Yes       Medications:  Scheduled Meds:   chlorhexidine  15 mL Mouth/Throat BID    cloNIDine 0.2 mg/24 hr td ptwk  1 patch Transdermal Q7 Days    dicyclomine  10 mg Oral TID    epoetin teresa-ebpx (RETACRIT) injection  10,000 Units Intravenous Every Tues, Thurs, Sat    furosemide  40 mg Oral BID    gabapentin  100 mg Oral BID    hydrALAZINE  100 mg Oral Q8H    insulin aspart U-100  5 Units Subcutaneous TIDWM    insulin detemir U-100  10 Units Subcutaneous Daily    isosorbide mononitrate  120 mg Oral Daily    lactulose  30 g Oral TID    levETIRAcetam  500 mg Oral BID    mirtazapine  7.5 mg Oral QHS    mupirocin   Nasal BID    pantoprazole  40 mg Oral Daily     Continuous Infusions:  PRN Meds:.acetaminophen, dextrose 10%, dextrose 10%, glucagon (human recombinant), glucose, glucose, HYDROmorphone, insulin aspart U-100, labetalol  No current facility-administered medications on file prior to encounter.     Current Outpatient Medications on File Prior to Encounter   Medication Sig Dispense  Refill    albuterol (PROVENTIL/VENTOLIN HFA) 90 mcg/actuation inhaler Inhale 2 puffs into the lungs every 6 (six) hours as needed for Wheezing. Rescue 18 g 3    amLODIPine (NORVASC) 10 MG tablet Take 1 tablet (10 mg total) by mouth once daily. 30 tablet 11    apixaban (ELIQUIS) 5 mg Tab Take 1 tablet (5 mg total) by mouth 2 (two) times daily. 60 tablet 2    carvediloL (COREG) 25 MG tablet Take 1 tablet (25 mg total) by mouth 2 (two) times daily. 60 tablet 2    cloNIDine 0.2 mg/24 hr td ptwk (CATAPRES) 0.2 mg/24 hr Place 1 patch onto the skin every 7 days. 4 patch 2    dicyclomine (BENTYL) 10 MG capsule Take 1 capsule (10 mg total) by mouth 3 (three) times daily. 90 capsule 0    ferrous sulfate 325 (65 FE) MG EC tablet Take 325 mg by mouth once daily.      fluconazole (DIFLUCAN) 150 MG Tab Take 150 mg by mouth once daily.      FLUoxetine 20 MG capsule Take 1 capsule (20 mg total) by mouth once daily. 30 capsule 11    furosemide (LASIX) 40 MG tablet Take 40 mg by mouth 2 (two) times a day.      gabapentin (NEURONTIN) 100 MG capsule Take 1 capsule (100 mg total) by mouth 2 (two) times daily. 90 capsule 2    hydrALAZINE (APRESOLINE) 100 MG tablet Take 1 tablet (100 mg total) by mouth every 8 (eight) hours. 90 tablet 2    insulin aspart U-100 (NOVOLOG) 100 unit/mL (3 mL) InPn pen Inject 1-10 Units into the skin before meals and at bedtime as needed (Hyperglycemia). Max daily dose 40 units. 3 mL 6    insulin detemir U-100 (LEVEMIR FLEXTOUCH) 100 unit/mL (3 mL) SubQ InPn pen Inject 9 Units into the skin once daily. 6 mL 2    insulin glargine 100 units/mL SubQ pen Inject 28 Units into the skin once daily.      insulin lispro 100 unit/mL pen Inject 8 Units into the skin 3 (three) times daily with meals.      isosorbide mononitrate (IMDUR) 60 MG 24 hr tablet Take 2 tablets (120 mg total) by mouth once daily. 30 tablet 11    levETIRAcetam (KEPPRA) 500 MG Tab Take 1 tablet (500 mg total) by mouth 2 (two) times daily. 60 tablet  "11    losartan-hydrochlorothiazide 100-12.5 mg (HYZAAR) 100-12.5 mg Tab Take 1 tablet by mouth once daily.      mirtazapine (REMERON SOL-TAB) 15 MG disintegrating tablet Take 1 tablet (15 mg total) by mouth nightly. 90 tablet 0    ondansetron (ZOFRAN) 4 MG tablet Take 1 tablet (4 mg total) by mouth every 8 (eight) hours as needed for Nausea. 60 tablet 2    pantoprazole (PROTONIX) 40 MG tablet Take 40 mg by mouth once daily.      pen needle, diabetic (BD ULTRA-FINE MAGALIE PEN NEEDLE) 32 gauge x 5/32" Ndle Inject into the skin 5 times per day with insulin 100 each 12    promethazine (PHENERGAN) 25 MG tablet Take 1 tablet (25 mg total) by mouth every 12 (twelve) hours as needed (nausea/vomiting not relieved with ondansetron (zofran)). 60 tablet 0    [DISCONTINUED] atenoloL (TENORMIN) 50 MG tablet Take 1 tablet (50 mg total) by mouth every other day. 45 tablet 3    [DISCONTINUED] omeprazole (PRILOSEC) 20 MG capsule Take 2 capsules (40 mg total) by mouth once daily. for 10 days 20 capsule 0    [DISCONTINUED] torsemide (DEMADEX) 20 MG Tab Take 1 tablet (20 mg total) by mouth 2 (two) times a day. (Patient taking differently: Take 20 mg by mouth once daily.) 60 tablet 1       Allergies:  Penicillins    Past Family History:  Reviewed; refer to Hospitalist Admission Note    Review of Systems:  Review of Systems - All 14 systems reviewed and negative, except as noted in HPI    Physical Exam:  General Appearance:    Ill, hurts all over, AAO x 3, cooperative, appears stated age   Head:    Normocephalic, atraumatic   Eyes:    PER, EOMI, and conjunctiva/sclera clear bilaterally       Mouth:   Moist mucus membranes, no thrush or oral lesions,       normal dentition   Back:     No CVA tenderness   Lungs:     Clear to auscultation bilaterally, no wheezes, crackles,           rales or rhonchi, symmetric air movement, respirations unlabored   Chest wall:    No tenderness or deformity   Heart:    Regular rate and rhythm, S1 and S2 normal, " no murmur, rub   or gallop   Abdomen:     Soft, non-tender, non-distended, bowel sounds active all four   quadrants, no RT or guarding, no masses, no organomegaly   Extremities:   Warm and well perfused, distal pulses are intact, no             cyanosis or peripheral edema   MSK:   No joint or muscle swelling, tenderness or deformity   Skin:   Skin color, texture, turgor normal, no rashes or lesions   Neurologic/Psychiatric:   CNII-XII intact, normal strength and sensation       throughout, no asterixis; normal affect, memory, judgement     and insight      Results:  Lab Results   Component Value Date     01/13/2023    K 4.4 01/13/2023     01/13/2023    CO2 24 01/13/2023    BUN 24 (H) 01/13/2023    CREATININE 3.2 (H) 01/13/2023    CALCIUM 9.1 01/13/2023    ANIONGAP 8 01/13/2023    ESTGFRAFRICA 20 (A) 07/31/2022    EGFRNONAA 18 (A) 07/31/2022       Lab Results   Component Value Date    CALCIUM 9.1 01/13/2023    PHOS 3.2 12/14/2022       Recent Labs   Lab 01/13/23  0321   WBC 9.31   RBC 3.03*   HGB 8.4*   HCT 24.6*   PLT 54*   MCV 81*   MCH 27.7   MCHC 34.1       I have personally reviewed pertinent radiological imaging and reports.    I have spent > 70 minutes providing care for this patient for the above diagnoses. These services have included chart/data/imaging review, evaluation, exam, formulation of plan, , note preparation, and discussions with staff involved in this patient's care.    Prateek Clark MD MPH  [facility]  [address] [place] [id number]  [phone] (p)  [phone] (f)

## 2023-01-14 NOTE — DISCHARGE SUMMARY
[facility] Medicine  Discharge Summary      Patient Name: Tabby Howard  MRN: [id number]  UNIQUE: [id number]  Patient Class: OP- Observation  Admission Date: 1/12/2023  Hospital Length of Stay: 0 days  Discharge Date and Time:  01/14/2023 7:44 AM  Attending Physician: Emani att. providers found   Discharging Provider: Alfonso Garsia MD  Primary Care Provider: [facility]    Primary Care Team: Networked reference to record PCT     HPI:   33 year old patient getting admitted with abdominal pain/hyperglycemia/pericardial effusion  Pt was supposed to have dialysis today and missed HD due to abdominal pain and came to ER  Describes abdominal pain as severe , all over with radiation to back . Associated with nausea  CT abdomen done 1 week ago showing large pericardial effusion  Stat ECHO ordered in ER   Pt was found to be Hyperglycemic and started on insulin drip and got admitted to ICU      * No surgery found *      Hospital Course:   Patient again got admitted with abdominal pain  This is a chronic issue for her and since November 2022 she already had 6 CT scans of abdomen /Pelvis  Pt may have Gastroparesis from Diabetes mellitus and not a candidate for Reglan because she has seizure disorder.  Upon admission pt had Dialysis for ESRD  Pt said her symptoms only subside with iv Dilaudid  CT scan done 1 week ago showed large pericardial effusion without evident Tamponade  Pt had ECHO, Evaluated by Cardiologist and advised conservative Rx  Pt is on NOAC for DVT which was stopped due to persistent Thrombocytopenia  Pt was prescribed PO dilaudid tablets and was later discharged to home  Pt should not get admitted for abdominal pain because it is a chronic issue and should be managed on outpatient basis        Goals of Care Treatment Preferences:  Code Status: Full Code    Health care agent: Step-Mother Tanya Howard / Father Garcia Howard  Health care agent number: [phone] / [phone]          What is most important right now is to focus on remaining as independent as possible.  Accordingly, we have decided that the best plan to meet the patient's goals includes continuing with treatment.      Consults:   Consults (From admission, onward)        Status Ordering Provider     Inpatient consult to Nephrology  Once        Provider:  Hugh Figueroa MD    Completed GENET GARAY     Inpatient consult to Nephrology  Once        Provider:  Prateek Clark MD    Completed GENET GARAY          No new Assessment & Plan notes have been filed under this hospital service since the last note was generated.  Service: Hospital Medicine    Final Active Diagnoses:    Diagnosis Date Noted POA    Pericardial effusion [I31.39] 03/07/2022 Yes    Anemia of chronic kidney failure, stage 5 [N18.5, D63.1] 10/31/2022 Yes     Chronic    Thrombocytopenia [D69.6] 10/26/2022 Yes     Chronic    Deep vein thrombosis (DVT) of non-extremity vein [I82.90] 07/26/2022 Yes     Chronic    ESRD (end stage renal disease) on dialysis [N18.6, Z99.2] 07/22/2022 Not Applicable     Chronic    Seizure disorder [G40.909] 05/29/2022 Yes     Chronic    Gastroparesis - suspected, unconfirmed [K31.84] 04/12/2022 Yes     Chronic    Sickle cell trait [D57.3] 04/12/2022 Yes     Chronic    Type 2 diabetes mellitus with chronic kidney disease on chronic dialysis, with long-term current use of insulin [E11.22, N18.6, Z99.2, Z79.4] 10/16/2019 Not Applicable     Chronic      Problems Resolved During this Admission:    Diagnosis Date Noted Date Resolved POA    PRINCIPAL PROBLEM:  Abdominal pain [R10.9] 01/12/2023 01/13/2023 Unknown    Hyperglycemia [R73.9] 10/26/2022 01/13/2023 Yes       Discharged Condition: good    Disposition: Home or Self Care    Follow Up:    Patient Instructions:   No discharge procedures on file.    Significant Diagnostic Studies: Labs:   CMP   Recent Labs   Lab 01/12/23  1219 01/13/23  0321   *  "138   K 4.1 4.4   CL 92* 106   CO2 27 24   * 201*   BUN 42* 24*   CREATININE 4.7* 3.2*   CALCIUM 8.6* 9.1   PROT 7.1 6.9   ALBUMIN 3.5 3.1*   BILITOT 1.7* 1.2*   ALKPHOS 215* 192*   AST 23 20   ALT 31 27   ANIONGAP 11 8    and CBC   Recent Labs   Lab 01/12/23  1318 01/13/23  0321   WBC 8.38 9.31   HGB 8.5* 8.4*   HCT 24.7* 24.6*   PLT 33* 54*       Pending Diagnostic Studies:     None         Medications:  Reconciled Home Medications:      Medication List      START taking these medications    HYDROmorphone 4 MG tablet  Commonly known as: DILAUDID  Take 1 tablet (4 mg total) by mouth every 12 (twelve) hours as needed for Pain.  Notes to patient: Last dose on 1/13/23@1129        CONTINUE taking these medications    albuterol 90 mcg/actuation inhaler  Commonly known as: PROVENTIL/VENTOLIN HFA  Inhale 2 puffs into the lungs every 6 (six) hours as needed for Wheezing. Rescue     amLODIPine 10 MG tablet  Commonly known as: NORVASC  Take 1 tablet (10 mg total) by mouth once daily.     BD ULTRA-FINE MAGALIE PEN NEEDLE 32 gauge x 5/32" Ndle  Generic drug: pen needle, diabetic  Inject into the skin 5 times per day with insulin     carvediloL 25 MG tablet  Commonly known as: COREG  Take 1 tablet (25 mg total) by mouth 2 (two) times daily.     cloNIDine 0.2 mg/24 hr td ptwk 0.2 mg/24 hr  Commonly known as: CATAPRES  Place 1 patch onto the skin every 7 days.     ferrous sulfate 325 (65 FE) MG EC tablet  Take 325 mg by mouth once daily.     fluconazole 150 MG Tab  Commonly known as: DIFLUCAN  Take 150 mg by mouth once daily.     FLUoxetine 20 MG capsule  Take 1 capsule (20 mg total) by mouth once daily.     furosemide 40 MG tablet  Commonly known as: LASIX  Take 40 mg by mouth 2 (two) times a day.     gabapentin 100 MG capsule  Commonly known as: NEURONTIN  Take 1 capsule (100 mg total) by mouth 2 (two) times daily.     hydrALAZINE 100 MG tablet  Commonly known as: APRESOLINE  Take 1 tablet (100 mg total) by mouth every 8 " (eight) hours.     insulin detemir U-100 100 unit/mL (3 mL) Inpn pen  Commonly known as: Levemir FLEXTOUCH  Inject 9 Units into the skin once daily.     insulin glargine 100 units/mL SubQ pen  Inject 28 Units into the skin once daily.     insulin lispro 100 unit/mL pen  Inject 8 Units into the skin 3 (three) times daily with meals.     isosorbide mononitrate 60 MG 24 hr tablet  Commonly known as: IMDUR  Take 2 tablets (120 mg total) by mouth once daily.     levETIRAcetam 500 MG Tab  Commonly known as: KEPPRA  Take 1 tablet (500 mg total) by mouth 2 (two) times daily.     losartan-hydrochlorothiazide 100-12.5 mg 100-12.5 mg Tab  Commonly known as: HYZAAR  Take 1 tablet by mouth once daily.     mirtazapine 15 MG disintegrating tablet  Commonly known as: REMERON SOL-TAB  Take 1 tablet (15 mg total) by mouth nightly.     NovoLOG FlexPen U-100 Insulin 100 unit/mL (3 mL) Inpn pen  Generic drug: insulin aspart U-100  Inject 1-10 Units into the skin before meals and at bedtime as needed (Hyperglycemia). Max daily dose 40 units.     ondansetron 4 MG tablet  Commonly known as: ZOFRAN  Take 1 tablet (4 mg total) by mouth every 8 (eight) hours as needed for Nausea.  Notes to patient: Last dose given 1/13/23@1130     pantoprazole 40 MG tablet  Commonly known as: PROTONIX  Take 40 mg by mouth once daily.     promethazine 25 MG tablet  Commonly known as: PHENERGAN  Take 1 tablet (25 mg total) by mouth every 12 (twelve) hours as needed (nausea/vomiting not relieved with ondansetron (zofran)).        STOP taking these medications    ELIQUIS 5 mg Tab  Generic drug: apixaban        ASK your doctor about these medications    dicyclomine 10 MG capsule  Commonly known as: BENTYL  Take 1 capsule (10 mg total) by mouth 3 (three) times daily.  Ask about: Should I take this medication?            Indwelling Lines/Drains at time of discharge:   Lines/Drains/Airways     Central Venous Catheter Line  Duration                Hemodialysis  Catheter 12/09/22 right subclavian 36 days              Physical Exam  Cardiovascular:      Rate and Rhythm: Normal rate.   Neurological:      General: No focal deficit present.      Mental Status: She is alert.       Time spent on the discharge of patient: 54 minutes         Alfonso Garsia MD  Department of Hospital Medicine  [facility]

## 2023-01-14 NOTE — NURSING
2023-Pt's father notified that no one has come and picked up the pt yet.  Pt's father stated that he was on his way and would be here in about 30min.     2114-Pt's father here waiting by the ER.  Assisted pt to get dressed.  D/C instructions provided to pt and pt verbalized understanding.    PIV x 1 removed w/catheter tip intact, pressure held and dressing applied to site.  No bleeding noted from site.      2122-Assisted pt to w/c pt tolerated well and taken to POV and assisted into vehicle w/o incident.

## 2023-01-14 NOTE — PROGRESS NOTES
Father has been contacted several times regarding d/c and has stated he is on his way since early morning.

## 2023-01-14 NOTE — HOSPITAL COURSE
Patient again got admitted with abdominal pain  This is a chronic issue for her and since November 2022 she already had 6 CT scans of abdomen /Pelvis  Pt may have Gastroparesis from Diabetes mellitus and not a candidate for Reglan because she has seizure disorder.  Upon admission pt had Dialysis for ESRD  Pt said her symptoms only subside with iv Dilaudid  CT scan done 1 week ago showed large pericardial effusion without evident Tamponade  Pt had ECHO, Evaluated by Cardiologist and advised conservative Rx  Pt is on NOAC for DVT which was stopped due to persistent Thrombocytopenia  Pt was prescribed PO dilaudid tablets and was later discharged to home  Pt should not get admitted for abdominal pain because it is a chronic issue and should be managed on outpatient basis

## 2023-01-23 ENCOUNTER — HOSPITAL ENCOUNTER (INPATIENT)
Facility: HOSPITAL | Age: 33
LOS: 2 days | Discharge: HOME | DRG: 951 | End: 2023-01-25
Attending: EMERGENCY MEDICINE | Admitting: INTERNAL MEDICINE
Payer: MEDICAID

## 2023-01-23 DIAGNOSIS — D64.9 SYMPTOMATIC ANEMIA: Primary | ICD-10-CM

## 2023-01-23 DIAGNOSIS — R07.9 CHEST PAIN: ICD-10-CM

## 2023-01-23 LAB
ABO + RH BLD: NORMAL
ALBUMIN SERPL BCP-MCNC: 3.1 G/DL (ref 3.5–5.2)
ALP SERPL-CCNC: 131 U/L (ref 55–135)
ALT SERPL W/O P-5'-P-CCNC: 13 U/L (ref 10–44)
ANION GAP SERPL CALC-SCNC: 14 MMOL/L (ref 8–16)
AST SERPL-CCNC: 17 U/L (ref 10–40)
BASOPHILS # BLD AUTO: 0.02 K/UL (ref 0–0.2)
BASOPHILS NFR BLD: 0.3 % (ref 0–1.9)
BILIRUB SERPL-MCNC: 2 MG/DL (ref 0.1–1)
BLD GP AB SCN CELLS X3 SERPL QL: NORMAL
BNP SERPL-MCNC: 2111 PG/ML (ref 0–99)
BUN SERPL-MCNC: 39 MG/DL (ref 6–20)
CALCIUM SERPL-MCNC: 8.6 MG/DL (ref 8.7–10.5)
CHLORIDE SERPL-SCNC: 89 MMOL/L (ref 95–110)
CK SERPL-CCNC: 84 U/L (ref 20–180)
CO2 SERPL-SCNC: 25 MMOL/L (ref 23–29)
CREAT SERPL-MCNC: 4.5 MG/DL (ref 0.5–1.4)
DIFFERENTIAL METHOD: ABNORMAL
EOSINOPHIL # BLD AUTO: 0.1 K/UL (ref 0–0.5)
EOSINOPHIL NFR BLD: 1.5 % (ref 0–8)
ERYTHROCYTE [DISTWIDTH] IN BLOOD BY AUTOMATED COUNT: 15.9 % (ref 11.5–14.5)
EST. GFR  (NO RACE VARIABLE): 12.5 ML/MIN/1.73 M^2
GLUCOSE SERPL-MCNC: 378 MG/DL (ref 70–110)
GLUCOSE SERPL-MCNC: 484 MG/DL (ref 70–110)
GLUCOSE SERPL-MCNC: 497 MG/DL (ref 70–110)
GLUCOSE SERPL-MCNC: 511 MG/DL (ref 70–110)
HCT VFR BLD AUTO: 21.1 % (ref 37–48.5)
HGB BLD-MCNC: 7.5 G/DL (ref 12–16)
IMM GRANULOCYTES # BLD AUTO: 0.03 K/UL (ref 0–0.04)
IMM GRANULOCYTES NFR BLD AUTO: 0.5 % (ref 0–0.5)
INFLUENZA A, MOLECULAR: NEGATIVE
INFLUENZA B, MOLECULAR: NEGATIVE
LIPASE SERPL-CCNC: 38 U/L (ref 4–60)
LYMPHOCYTES # BLD AUTO: 0.9 K/UL (ref 1–4.8)
LYMPHOCYTES NFR BLD: 15 % (ref 18–48)
MAGNESIUM SERPL-MCNC: 1.6 MG/DL (ref 1.6–2.6)
MCH RBC QN AUTO: 27.8 PG (ref 27–31)
MCHC RBC AUTO-ENTMCNC: 35.5 G/DL (ref 32–36)
MCV RBC AUTO: 78 FL (ref 82–98)
MONOCYTES # BLD AUTO: 0.3 K/UL (ref 0.3–1)
MONOCYTES NFR BLD: 4.4 % (ref 4–15)
NEUTROPHILS # BLD AUTO: 4.8 K/UL (ref 1.8–7.7)
NEUTROPHILS NFR BLD: 78.3 % (ref 38–73)
NRBC BLD-RTO: 0 /100 WBC
PLATELET # BLD AUTO: 57 K/UL (ref 150–450)
PMV BLD AUTO: 9.6 FL (ref 9.2–12.9)
POTASSIUM SERPL-SCNC: 4.1 MMOL/L (ref 3.5–5.1)
PROT SERPL-MCNC: 6.6 G/DL (ref 6–8.4)
RBC # BLD AUTO: 2.7 M/UL (ref 4–5.4)
SARS-COV-2 RDRP RESP QL NAA+PROBE: NEGATIVE
SODIUM SERPL-SCNC: 128 MMOL/L (ref 136–145)
SPECIMEN SOURCE: NORMAL
TROPONIN I SERPL HS-MCNC: 30.1 PG/ML (ref 0–14.9)
TROPONIN I SERPL HS-MCNC: 31.8 PG/ML (ref 0–14.9)
TSH SERPL DL<=0.005 MIU/L-ACNC: 1.22 UIU/ML (ref 0.34–5.6)
WBC # BLD AUTO: 6.15 K/UL (ref 3.9–12.7)

## 2023-01-23 PROCEDURE — 83880 ASSAY OF NATRIURETIC PEPTIDE: CPT | Performed by: EMERGENCY MEDICINE

## 2023-01-23 PROCEDURE — 93010 EKG 12-LEAD: ICD-10-PCS | Mod: ,,, | Performed by: INTERNAL MEDICINE

## 2023-01-23 PROCEDURE — 80307 DRUG TEST PRSMV CHEM ANLYZR: CPT | Performed by: EMERGENCY MEDICINE

## 2023-01-23 PROCEDURE — 83735 ASSAY OF MAGNESIUM: CPT | Performed by: EMERGENCY MEDICINE

## 2023-01-23 PROCEDURE — 80361 OPIATES 1 OR MORE: CPT | Performed by: EMERGENCY MEDICINE

## 2023-01-23 PROCEDURE — 63600175 PHARM REV CODE 636 W HCPCS: Performed by: NURSE PRACTITIONER

## 2023-01-23 PROCEDURE — 80365 DRUG SCREENING OXYCODONE: CPT | Performed by: EMERGENCY MEDICINE

## 2023-01-23 PROCEDURE — 36430 TRANSFUSION BLD/BLD COMPNT: CPT

## 2023-01-23 PROCEDURE — 96361 HYDRATE IV INFUSION ADD-ON: CPT

## 2023-01-23 PROCEDURE — 25000003 PHARM REV CODE 250: Performed by: EMERGENCY MEDICINE

## 2023-01-23 PROCEDURE — P9016 RBC LEUKOCYTES REDUCED: HCPCS | Performed by: EMERGENCY MEDICINE

## 2023-01-23 PROCEDURE — U0002 COVID-19 LAB TEST NON-CDC: HCPCS | Performed by: EMERGENCY MEDICINE

## 2023-01-23 PROCEDURE — 99285 EMERGENCY DEPT VISIT HI MDM: CPT | Mod: 25

## 2023-01-23 PROCEDURE — 82962 GLUCOSE BLOOD TEST: CPT

## 2023-01-23 PROCEDURE — 93005 ELECTROCARDIOGRAM TRACING: CPT | Performed by: INTERNAL MEDICINE

## 2023-01-23 PROCEDURE — 80356 HEROIN METABOLITE: CPT | Performed by: EMERGENCY MEDICINE

## 2023-01-23 PROCEDURE — 87502 INFLUENZA DNA AMP PROBE: CPT | Performed by: NURSE PRACTITIONER

## 2023-01-23 PROCEDURE — 96375 TX/PRO/DX INJ NEW DRUG ADDON: CPT

## 2023-01-23 PROCEDURE — 63600175 PHARM REV CODE 636 W HCPCS: Performed by: EMERGENCY MEDICINE

## 2023-01-23 PROCEDURE — 86920 COMPATIBILITY TEST SPIN: CPT | Performed by: EMERGENCY MEDICINE

## 2023-01-23 PROCEDURE — 83690 ASSAY OF LIPASE: CPT | Performed by: NURSE PRACTITIONER

## 2023-01-23 PROCEDURE — 85025 COMPLETE CBC W/AUTO DIFF WBC: CPT | Performed by: NURSE PRACTITIONER

## 2023-01-23 PROCEDURE — 96374 THER/PROPH/DIAG INJ IV PUSH: CPT

## 2023-01-23 PROCEDURE — 93010 ELECTROCARDIOGRAM REPORT: CPT | Mod: ,,, | Performed by: INTERNAL MEDICINE

## 2023-01-23 PROCEDURE — 80053 COMPREHEN METABOLIC PANEL: CPT | Performed by: NURSE PRACTITIONER

## 2023-01-23 PROCEDURE — 86900 BLOOD TYPING SEROLOGIC ABO: CPT | Performed by: EMERGENCY MEDICINE

## 2023-01-23 PROCEDURE — 84443 ASSAY THYROID STIM HORMONE: CPT | Performed by: EMERGENCY MEDICINE

## 2023-01-23 PROCEDURE — 12000002 HC ACUTE/MED SURGE SEMI-PRIVATE ROOM

## 2023-01-23 PROCEDURE — 96376 TX/PRO/DX INJ SAME DRUG ADON: CPT

## 2023-01-23 PROCEDURE — 84484 ASSAY OF TROPONIN QUANT: CPT | Mod: 91 | Performed by: EMERGENCY MEDICINE

## 2023-01-23 PROCEDURE — 86920 COMPATIBILITY TEST SPIN: CPT | Performed by: INTERNAL MEDICINE

## 2023-01-23 PROCEDURE — 82550 ASSAY OF CK (CPK): CPT | Performed by: EMERGENCY MEDICINE

## 2023-01-23 RX ORDER — SODIUM CHLORIDE 9 MG/ML
1000 INJECTION, SOLUTION INTRAVENOUS
Status: COMPLETED | OUTPATIENT
Start: 2023-01-23 | End: 2023-01-23

## 2023-01-23 RX ORDER — GABAPENTIN 100 MG/1
100 CAPSULE ORAL 2 TIMES DAILY
Status: DISCONTINUED | OUTPATIENT
Start: 2023-01-24 | End: 2023-01-24

## 2023-01-23 RX ORDER — ALBUTEROL SULFATE 0.83 MG/ML
2.5 SOLUTION RESPIRATORY (INHALATION) EVERY 6 HOURS PRN
Status: DISCONTINUED | OUTPATIENT
Start: 2023-01-24 | End: 2023-01-24

## 2023-01-23 RX ORDER — HYDROMORPHONE HYDROCHLORIDE 1 MG/ML
1 INJECTION, SOLUTION INTRAMUSCULAR; INTRAVENOUS; SUBCUTANEOUS
Status: COMPLETED | OUTPATIENT
Start: 2023-01-23 | End: 2023-01-23

## 2023-01-23 RX ORDER — HYDROCODONE BITARTRATE AND ACETAMINOPHEN 500; 5 MG/1; MG/1
TABLET ORAL
Status: DISCONTINUED | OUTPATIENT
Start: 2023-01-23 | End: 2023-01-25 | Stop reason: HOSPADM

## 2023-01-23 RX ORDER — FLUOXETINE HYDROCHLORIDE 20 MG/1
20 CAPSULE ORAL DAILY
Status: DISCONTINUED | OUTPATIENT
Start: 2023-01-24 | End: 2023-01-25 | Stop reason: HOSPADM

## 2023-01-23 RX ORDER — ONDANSETRON 2 MG/ML
4 INJECTION INTRAMUSCULAR; INTRAVENOUS
Status: COMPLETED | OUTPATIENT
Start: 2023-01-23 | End: 2023-01-23

## 2023-01-23 RX ORDER — LOSARTAN POTASSIUM AND HYDROCHLOROTHIAZIDE 12.5; 1 MG/1; MG/1
1 TABLET ORAL DAILY
Status: DISCONTINUED | OUTPATIENT
Start: 2023-01-24 | End: 2023-01-24

## 2023-01-23 RX ORDER — LANOLIN ALCOHOL/MO/W.PET/CERES
1 CREAM (GRAM) TOPICAL DAILY
Status: DISCONTINUED | OUTPATIENT
Start: 2023-01-24 | End: 2023-01-25 | Stop reason: HOSPADM

## 2023-01-23 RX ORDER — SODIUM CHLORIDE, SODIUM LACTATE, POTASSIUM CHLORIDE, CALCIUM CHLORIDE 600; 310; 30; 20 MG/100ML; MG/100ML; MG/100ML; MG/100ML
INJECTION, SOLUTION INTRAVENOUS CONTINUOUS
Status: DISCONTINUED | OUTPATIENT
Start: 2023-01-24 | End: 2023-01-23

## 2023-01-23 RX ORDER — CARVEDILOL 25 MG/1
25 TABLET ORAL 2 TIMES DAILY
Status: DISCONTINUED | OUTPATIENT
Start: 2023-01-24 | End: 2023-01-25 | Stop reason: HOSPADM

## 2023-01-23 RX ORDER — HYDRALAZINE HYDROCHLORIDE 25 MG/1
100 TABLET, FILM COATED ORAL EVERY 8 HOURS
Status: DISCONTINUED | OUTPATIENT
Start: 2023-01-24 | End: 2023-01-25 | Stop reason: HOSPADM

## 2023-01-23 RX ORDER — FUROSEMIDE 40 MG/1
40 TABLET ORAL 2 TIMES DAILY
Status: DISCONTINUED | OUTPATIENT
Start: 2023-01-24 | End: 2023-01-25 | Stop reason: HOSPADM

## 2023-01-23 RX ORDER — MIRTAZAPINE 15 MG/1
15 TABLET, FILM COATED ORAL NIGHTLY
Status: DISCONTINUED | OUTPATIENT
Start: 2023-01-24 | End: 2023-01-25 | Stop reason: HOSPADM

## 2023-01-23 RX ORDER — ISOSORBIDE MONONITRATE 30 MG/1
120 TABLET, EXTENDED RELEASE ORAL DAILY
Status: DISCONTINUED | OUTPATIENT
Start: 2023-01-24 | End: 2023-01-25 | Stop reason: HOSPADM

## 2023-01-23 RX ORDER — INSULIN ASPART 100 [IU]/ML
0-5 INJECTION, SOLUTION INTRAVENOUS; SUBCUTANEOUS EVERY 6 HOURS PRN
Status: DISCONTINUED | OUTPATIENT
Start: 2023-01-24 | End: 2023-01-24

## 2023-01-23 RX ORDER — HYDROMORPHONE HYDROCHLORIDE 2 MG/1
2 TABLET ORAL EVERY 6 HOURS PRN
Status: DISCONTINUED | OUTPATIENT
Start: 2023-01-24 | End: 2023-01-24

## 2023-01-23 RX ORDER — TRAMADOL HYDROCHLORIDE 50 MG/1
50 TABLET ORAL EVERY 8 HOURS PRN
Qty: 30 TABLET | Refills: 0 | Status: CANCELLED | OUTPATIENT
Start: 2023-01-23 | End: 2023-01-30

## 2023-01-23 RX ORDER — LEVETIRACETAM 500 MG/1
500 TABLET ORAL 2 TIMES DAILY
Status: DISCONTINUED | OUTPATIENT
Start: 2023-01-24 | End: 2023-01-25 | Stop reason: HOSPADM

## 2023-01-23 RX ORDER — ONDANSETRON 4 MG/1
4 TABLET, ORALLY DISINTEGRATING ORAL EVERY 6 HOURS PRN
Status: DISCONTINUED | OUTPATIENT
Start: 2023-01-24 | End: 2023-01-25 | Stop reason: HOSPADM

## 2023-01-23 RX ORDER — SODIUM CHLORIDE 9 MG/ML
INJECTION, SOLUTION INTRAVENOUS CONTINUOUS
Status: DISCONTINUED | OUTPATIENT
Start: 2023-01-24 | End: 2023-01-24

## 2023-01-23 RX ORDER — INSULIN ASPART 100 [IU]/ML
1-10 INJECTION, SOLUTION INTRAVENOUS; SUBCUTANEOUS
Status: DISCONTINUED | OUTPATIENT
Start: 2023-01-24 | End: 2023-01-24

## 2023-01-23 RX ORDER — CLONIDINE 0.2 MG/24H
1 PATCH, EXTENDED RELEASE TRANSDERMAL
Status: DISCONTINUED | OUTPATIENT
Start: 2023-01-24 | End: 2023-01-24

## 2023-01-23 RX ORDER — PANTOPRAZOLE SODIUM 40 MG/1
40 TABLET, DELAYED RELEASE ORAL
Status: DISCONTINUED | OUTPATIENT
Start: 2023-01-24 | End: 2023-01-25 | Stop reason: HOSPADM

## 2023-01-23 RX ORDER — GLUCAGON 1 MG
1 KIT INJECTION
Status: DISCONTINUED | OUTPATIENT
Start: 2023-01-24 | End: 2023-01-25 | Stop reason: HOSPADM

## 2023-01-23 RX ORDER — ALBUTEROL SULFATE 90 UG/1
2 AEROSOL, METERED RESPIRATORY (INHALATION) EVERY 6 HOURS PRN
Status: DISCONTINUED | OUTPATIENT
Start: 2023-01-24 | End: 2023-01-23

## 2023-01-23 RX ORDER — AMLODIPINE BESYLATE 5 MG/1
10 TABLET ORAL DAILY
Status: DISCONTINUED | OUTPATIENT
Start: 2023-01-24 | End: 2023-01-25 | Stop reason: HOSPADM

## 2023-01-23 RX ADMIN — HYDROMORPHONE HYDROCHLORIDE 1 MG: 1 INJECTION, SOLUTION INTRAMUSCULAR; INTRAVENOUS; SUBCUTANEOUS at 07:01

## 2023-01-23 RX ADMIN — ONDANSETRON 4 MG: 2 INJECTION INTRAMUSCULAR; INTRAVENOUS at 07:01

## 2023-01-23 RX ADMIN — HYDROMORPHONE HYDROCHLORIDE 1 MG: 1 INJECTION, SOLUTION INTRAMUSCULAR; INTRAVENOUS; SUBCUTANEOUS at 11:01

## 2023-01-23 RX ADMIN — HUMAN INSULIN 5 UNITS: 100 INJECTION, SOLUTION SUBCUTANEOUS at 10:01

## 2023-01-23 RX ADMIN — SODIUM CHLORIDE 1000 ML: 0.9 INJECTION, SOLUTION INTRAVENOUS at 07:01

## 2023-01-24 PROBLEM — Z76.5 DRUG-SEEKING BEHAVIOR: Status: ACTIVE | Noted: 2023-01-24

## 2023-01-24 PROBLEM — R10.9 ABDOMINAL PAIN: Status: ACTIVE | Noted: 2023-01-24

## 2023-01-24 LAB
ALBUMIN SERPL BCP-MCNC: 2.7 G/DL (ref 3.5–5.2)
ALP SERPL-CCNC: 119 U/L (ref 55–135)
ALT SERPL W/O P-5'-P-CCNC: 12 U/L (ref 10–44)
ANION GAP SERPL CALC-SCNC: 12 MMOL/L (ref 8–16)
AST SERPL-CCNC: 15 U/L (ref 10–40)
BILIRUB SERPL-MCNC: 1.7 MG/DL (ref 0.1–1)
BLD PROD TYP BPU: NORMAL
BLD PROD TYP BPU: NORMAL
BLOOD UNIT EXPIRATION DATE: NORMAL
BLOOD UNIT EXPIRATION DATE: NORMAL
BLOOD UNIT TYPE CODE: 6200
BLOOD UNIT TYPE CODE: 6200
BLOOD UNIT TYPE: NORMAL
BLOOD UNIT TYPE: NORMAL
BUN SERPL-MCNC: 47 MG/DL (ref 6–20)
CALCIUM SERPL-MCNC: 8.3 MG/DL (ref 8.7–10.5)
CHLORIDE SERPL-SCNC: 91 MMOL/L (ref 95–110)
CO2 SERPL-SCNC: 25 MMOL/L (ref 23–29)
CODING SYSTEM: NORMAL
CODING SYSTEM: NORMAL
CREAT SERPL-MCNC: 5.2 MG/DL (ref 0.5–1.4)
DISPENSE STATUS: NORMAL
DISPENSE STATUS: NORMAL
ERYTHROCYTE [DISTWIDTH] IN BLOOD BY AUTOMATED COUNT: 15.8 % (ref 11.5–14.5)
EST. GFR  (NO RACE VARIABLE): 10.5 ML/MIN/1.73 M^2
GLUCOSE SERPL-MCNC: 136 MG/DL (ref 70–110)
GLUCOSE SERPL-MCNC: 358 MG/DL (ref 70–110)
GLUCOSE SERPL-MCNC: 364 MG/DL (ref 70–110)
GLUCOSE SERPL-MCNC: 53 MG/DL (ref 70–110)
GLUCOSE SERPL-MCNC: 66 MG/DL (ref 70–110)
GLUCOSE SERPL-MCNC: 92 MG/DL (ref 70–110)
HCT VFR BLD AUTO: 29.2 % (ref 37–48.5)
HGB BLD-MCNC: 10.3 G/DL (ref 12–16)
MCH RBC QN AUTO: 28.6 PG (ref 27–31)
MCHC RBC AUTO-ENTMCNC: 35.3 G/DL (ref 32–36)
MCV RBC AUTO: 81 FL (ref 82–98)
NUM UNITS TRANS PACKED RBC: NORMAL
NUM UNITS TRANS PACKED RBC: NORMAL
PLATELET # BLD AUTO: 50 K/UL (ref 150–450)
PMV BLD AUTO: 9.6 FL (ref 9.2–12.9)
POTASSIUM SERPL-SCNC: 4 MMOL/L (ref 3.5–5.1)
PROT SERPL-MCNC: 5.9 G/DL (ref 6–8.4)
RBC # BLD AUTO: 3.6 M/UL (ref 4–5.4)
SODIUM SERPL-SCNC: 128 MMOL/L (ref 136–145)
WBC # BLD AUTO: 7.96 K/UL (ref 3.9–12.7)

## 2023-01-24 PROCEDURE — 25000003 PHARM REV CODE 250: Performed by: NURSE PRACTITIONER

## 2023-01-24 PROCEDURE — 36415 COLL VENOUS BLD VENIPUNCTURE: CPT | Performed by: INTERNAL MEDICINE

## 2023-01-24 PROCEDURE — 36430 TRANSFUSION BLD/BLD COMPNT: CPT

## 2023-01-24 PROCEDURE — 12000002 HC ACUTE/MED SURGE SEMI-PRIVATE ROOM

## 2023-01-24 PROCEDURE — 25000003 PHARM REV CODE 250: Performed by: INTERNAL MEDICINE

## 2023-01-24 PROCEDURE — 82962 GLUCOSE BLOOD TEST: CPT

## 2023-01-24 PROCEDURE — 63600175 PHARM REV CODE 636 W HCPCS: Performed by: INTERNAL MEDICINE

## 2023-01-24 PROCEDURE — 63600175 PHARM REV CODE 636 W HCPCS: Mod: JG | Performed by: INTERNAL MEDICINE

## 2023-01-24 PROCEDURE — P9016 RBC LEUKOCYTES REDUCED: HCPCS | Performed by: EMERGENCY MEDICINE

## 2023-01-24 PROCEDURE — 85027 COMPLETE CBC AUTOMATED: CPT | Performed by: INTERNAL MEDICINE

## 2023-01-24 PROCEDURE — 80053 COMPREHEN METABOLIC PANEL: CPT | Performed by: INTERNAL MEDICINE

## 2023-01-24 PROCEDURE — 90935 HEMODIALYSIS ONE EVALUATION: CPT

## 2023-01-24 RX ORDER — SODIUM CHLORIDE 9 MG/ML
INJECTION, SOLUTION INTRAVENOUS
Status: CANCELLED | OUTPATIENT
Start: 2023-01-24

## 2023-01-24 RX ORDER — GLUCAGON 1 MG
1 KIT INJECTION
Status: DISCONTINUED | OUTPATIENT
Start: 2023-01-24 | End: 2023-01-25 | Stop reason: HOSPADM

## 2023-01-24 RX ORDER — IBUPROFEN 200 MG
16 TABLET ORAL
Status: DISCONTINUED | OUTPATIENT
Start: 2023-01-24 | End: 2023-01-25 | Stop reason: HOSPADM

## 2023-01-24 RX ORDER — SODIUM CHLORIDE 9 MG/ML
INJECTION, SOLUTION INTRAVENOUS ONCE
Status: CANCELLED | OUTPATIENT
Start: 2023-01-24 | End: 2023-01-24

## 2023-01-24 RX ORDER — LOSARTAN POTASSIUM 50 MG/1
100 TABLET ORAL DAILY
Status: DISCONTINUED | OUTPATIENT
Start: 2023-01-24 | End: 2023-01-25 | Stop reason: HOSPADM

## 2023-01-24 RX ORDER — MUPIROCIN 20 MG/G
OINTMENT TOPICAL 2 TIMES DAILY
Status: DISCONTINUED | OUTPATIENT
Start: 2023-01-24 | End: 2023-01-24

## 2023-01-24 RX ORDER — LACTULOSE 10 G/15ML
20 SOLUTION ORAL 3 TIMES DAILY
Status: DISCONTINUED | OUTPATIENT
Start: 2023-01-24 | End: 2023-01-25 | Stop reason: HOSPADM

## 2023-01-24 RX ORDER — CLONIDINE HYDROCHLORIDE 0.1 MG/1
0.1 TABLET ORAL EVERY 8 HOURS PRN
Status: DISCONTINUED | OUTPATIENT
Start: 2023-01-24 | End: 2023-01-25 | Stop reason: HOSPADM

## 2023-01-24 RX ORDER — HYDROCHLOROTHIAZIDE 12.5 MG/1
12.5 TABLET ORAL DAILY
Status: DISCONTINUED | OUTPATIENT
Start: 2023-01-24 | End: 2023-01-25 | Stop reason: HOSPADM

## 2023-01-24 RX ORDER — IBUPROFEN 200 MG
24 TABLET ORAL
Status: DISCONTINUED | OUTPATIENT
Start: 2023-01-24 | End: 2023-01-25 | Stop reason: HOSPADM

## 2023-01-24 RX ORDER — CLONIDINE 0.3 MG/24H
1 PATCH, EXTENDED RELEASE TRANSDERMAL
Status: DISCONTINUED | OUTPATIENT
Start: 2023-01-24 | End: 2023-01-25 | Stop reason: HOSPADM

## 2023-01-24 RX ORDER — INSULIN ASPART 100 [IU]/ML
0-5 INJECTION, SOLUTION INTRAVENOUS; SUBCUTANEOUS
Status: DISCONTINUED | OUTPATIENT
Start: 2023-01-24 | End: 2023-01-24

## 2023-01-24 RX ORDER — INSULIN ASPART 100 [IU]/ML
1-10 INJECTION, SOLUTION INTRAVENOUS; SUBCUTANEOUS
Status: DISCONTINUED | OUTPATIENT
Start: 2023-01-24 | End: 2023-01-25 | Stop reason: HOSPADM

## 2023-01-24 RX ORDER — HYOSCYAMINE SULFATE 0.5 MG/ML
0.25 INJECTION, SOLUTION SUBCUTANEOUS ONCE
Status: COMPLETED | OUTPATIENT
Start: 2023-01-24 | End: 2023-01-24

## 2023-01-24 RX ADMIN — FUROSEMIDE 40 MG: 40 TABLET ORAL at 08:01

## 2023-01-24 RX ADMIN — LACTULOSE 20 G: 20 SOLUTION ORAL at 07:01

## 2023-01-24 RX ADMIN — INSULIN DETEMIR 28 UNITS: 100 INJECTION, SOLUTION SUBCUTANEOUS at 11:01

## 2023-01-24 RX ADMIN — Medication 16 G: at 05:01

## 2023-01-24 RX ADMIN — LEVETIRACETAM 500 MG: 500 TABLET, FILM COATED ORAL at 08:01

## 2023-01-24 RX ADMIN — AMLODIPINE BESYLATE 10 MG: 5 TABLET ORAL at 11:01

## 2023-01-24 RX ADMIN — ISOSORBIDE MONONITRATE 120 MG: 30 TABLET, EXTENDED RELEASE ORAL at 11:01

## 2023-01-24 RX ADMIN — FUROSEMIDE 40 MG: 40 TABLET ORAL at 11:01

## 2023-01-24 RX ADMIN — HYDRALAZINE HYDROCHLORIDE 100 MG: 25 TABLET ORAL at 09:01

## 2023-01-24 RX ADMIN — CLONIDINE HYDROCHLORIDE 0.1 MG: 0.1 TABLET ORAL at 06:01

## 2023-01-24 RX ADMIN — EPOETIN ALFA-EPBX 10000 UNITS: 10000 INJECTION, SOLUTION INTRAVENOUS; SUBCUTANEOUS at 01:01

## 2023-01-24 RX ADMIN — FLUOXETINE 20 MG: 20 CAPSULE ORAL at 11:01

## 2023-01-24 RX ADMIN — LEVETIRACETAM 500 MG: 500 TABLET, FILM COATED ORAL at 11:01

## 2023-01-24 RX ADMIN — CLONIDINE 1 PATCH: 0.3 PATCH TRANSDERMAL at 12:01

## 2023-01-24 RX ADMIN — MIRTAZAPINE 15 MG: 15 TABLET, FILM COATED ORAL at 12:01

## 2023-01-24 RX ADMIN — HYOSCYAMINE SULFATE 0.25 MG: 0.5 INJECTION, SOLUTION SUBCUTANEOUS at 08:01

## 2023-01-24 RX ADMIN — HYDROCHLOROTHIAZIDE 12.5 MG: 12.5 TABLET ORAL at 11:01

## 2023-01-24 RX ADMIN — PANTOPRAZOLE SODIUM 40 MG: 40 TABLET, DELAYED RELEASE ORAL at 05:01

## 2023-01-24 RX ADMIN — MUPIROCIN 1 G: 20 OINTMENT TOPICAL at 11:01

## 2023-01-24 RX ADMIN — MIRTAZAPINE 15 MG: 15 TABLET, FILM COATED ORAL at 08:01

## 2023-01-24 RX ADMIN — HYDRALAZINE HYDROCHLORIDE 100 MG: 25 TABLET ORAL at 05:01

## 2023-01-24 RX ADMIN — FERROUS SULFATE TAB 325 MG (65 MG ELEMENTAL FE) 1 EACH: 325 (65 FE) TAB at 11:01

## 2023-01-24 RX ADMIN — HYDROMORPHONE HYDROCHLORIDE 2 MG: 2 TABLET ORAL at 06:01

## 2023-01-24 RX ADMIN — LOSARTAN POTASSIUM 100 MG: 50 TABLET, FILM COATED ORAL at 11:01

## 2023-01-24 RX ADMIN — CARVEDILOL 25 MG: 25 TABLET, FILM COATED ORAL at 11:01

## 2023-01-24 RX ADMIN — CARVEDILOL 25 MG: 25 TABLET, FILM COATED ORAL at 08:01

## 2023-01-24 RX ADMIN — INSULIN ASPART 10 UNITS: 100 INJECTION, SOLUTION INTRAVENOUS; SUBCUTANEOUS at 11:01

## 2023-01-24 NOTE — SUBJECTIVE & OBJECTIVE
Past Medical History:   Diagnosis Date    CKD (chronic kidney disease), stage IV 2022    Diabetes mellitus due to underlying condition with unspecified complications 2022    Gastroparesis 2022    Heart failure with preserved ejection fraction 2022    EF 55% on 3/22    History of gastroesophageal reflux (GERD)     History of supraventricular tachycardia     Hyperkalemia 2022    Hypertensive emergency 2022    Sickle cell trait 2022    Type 2 diabetes mellitus        Past Surgical History:   Procedure Laterality Date     SECTION      x 3    COLONOSCOPY      COLONOSCOPY N/A 2022    Procedure: COLONOSCOPY;  Surgeon: Jagdeep Cedeno MD;  Location: [facility];  Service: Endoscopy;  Laterality: N/A;    ESOPHAGOGASTRODUODENOSCOPY N/A 10/18/2019    Procedure: ESOPHAGOGASTRODUODENOSCOPY (EGD);  Surgeon: Gianluca Mendez MD;  Location: [facility];  Service: Endoscopy;  Laterality: N/A;    ESOPHAGOGASTRODUODENOSCOPY N/A 2022    Procedure: EGD (ESOPHAGOGASTRODUODENOSCOPY);  Surgeon: Micky Paredes III, MD;  Location: [facility];  Service: Endoscopy;  Laterality: N/A;    ESOPHAGOGASTRODUODENOSCOPY N/A 2022    Procedure: EGD (ESOPHAGOGASTRODUODENOSCOPY);  Surgeon: Marcelo Zhong MD;  Location: [facility];  Service: Endoscopy;  Laterality: N/A;    LAPAROSCOPIC CHOLECYSTECTOMY N/A 2022    Procedure: CHOLECYSTECTOMY, LAPAROSCOPIC;  Surgeon: Grey Perez MD;  Location: [facility];  Service: General;  Laterality: N/A;    PLACEMENT OF DUAL-LUMEN VASCULAR CATHETER Left 2022    Procedure: INSERTION-CATHETER-JOSEPH;  Surgeon: Dionte Gan MD;  Location: [facility] OR;  Service: General;  Laterality: Left;    PLACEMENT OF DUAL-LUMEN VASCULAR CATHETER Right 2022    Procedure: INSERTION-CATHETER-Hemosplit;  Surgeon: Dionte Gan MD;  Location: [facility] OR;  Service: General;  Laterality: Right;       Review of patient's allergies indicates:   Allergen Reactions     Penicillins Hives       No current facility-administered medications on file prior to encounter.     Current Outpatient Medications on File Prior to Encounter   Medication Sig    HYDROmorphone (DILAUDID) 4 MG tablet Take 1 tablet (4 mg total) by mouth every 12 (twelve) hours as needed for Pain.    insulin aspart U-100 (NOVOLOG) 100 unit/mL (3 mL) InPn pen Inject 1-10 Units into the skin before meals and at bedtime as needed (Hyperglycemia). Max daily dose 40 units.    insulin detemir U-100 (LEVEMIR FLEXTOUCH) 100 unit/mL (3 mL) SubQ InPn pen Inject 9 Units into the skin once daily.    insulin glargine 100 units/mL SubQ pen Inject 28 Units into the skin once daily.    insulin lispro 100 unit/mL pen Inject 8 Units into the skin 3 (three) times daily with meals.    isosorbide mononitrate (IMDUR) 60 MG 24 hr tablet Take 2 tablets (120 mg total) by mouth once daily.    levETIRAcetam (KEPPRA) 500 MG Tab Take 1 tablet (500 mg total) by mouth 2 (two) times daily.    losartan-hydrochlorothiazide 100-12.5 mg (HYZAAR) 100-12.5 mg Tab Take 1 tablet by mouth once daily.    mirtazapine (REMERON SOL-TAB) 15 MG disintegrating tablet Take 1 tablet (15 mg total) by mouth nightly.    ondansetron (ZOFRAN) 4 MG tablet Take 1 tablet (4 mg total) by mouth every 8 (eight) hours as needed for Nausea.    pantoprazole (PROTONIX) 40 MG tablet Take 40 mg by mouth once daily.    albuterol (PROVENTIL/VENTOLIN HFA) 90 mcg/actuation inhaler Inhale 2 puffs into the lungs every 6 (six) hours as needed for Wheezing. Rescue    amLODIPine (NORVASC) 10 MG tablet Take 1 tablet (10 mg total) by mouth once daily.    carvediloL (COREG) 25 MG tablet Take 1 tablet (25 mg total) by mouth 2 (two) times daily.    cloNIDine 0.2 mg/24 hr td ptwk (CATAPRES) 0.2 mg/24 hr Place 1 patch onto the skin every 7 days.    ferrous sulfate 325 (65 FE) MG EC tablet Take 325 mg by mouth once daily.    fluconazole (DIFLUCAN) 150 MG Tab Take 150 mg by mouth once daily.     "FLUoxetine 20 MG capsule Take 1 capsule (20 mg total) by mouth once daily.    furosemide (LASIX) 40 MG tablet Take 40 mg by mouth 2 (two) times a day.    gabapentin (NEURONTIN) 100 MG capsule Take 1 capsule (100 mg total) by mouth 2 (two) times daily.    hydrALAZINE (APRESOLINE) 100 MG tablet Take 1 tablet (100 mg total) by mouth every 8 (eight) hours.    pen needle, diabetic (BD ULTRA-FINE MAGALIE PEN NEEDLE) 32 gauge x 5/32" Ndle Inject into the skin 5 times per day with insulin    [DISCONTINUED] atenoloL (TENORMIN) 50 MG tablet Take 1 tablet (50 mg total) by mouth every other day.    [DISCONTINUED] omeprazole (PRILOSEC) 20 MG capsule Take 2 capsules (40 mg total) by mouth once daily. for 10 days    [DISCONTINUED] torsemide (DEMADEX) 20 MG Tab Take 1 tablet (20 mg total) by mouth 2 (two) times a day. (Patient taking differently: Take 20 mg by mouth once daily.)     Family History       Problem Relation (Age of Onset)    Diabetes Mother, Father          Tobacco Use    Smoking status: Never    Smokeless tobacco: Never   Substance and Sexual Activity    Alcohol use: Not Currently    Drug use: No    Sexual activity: Not Currently     Partners: Male     Birth control/protection: I.U.D.     Review of Systems   Constitutional:  Negative for chills, diaphoresis and fatigue.   HENT:  Negative for congestion, ear pain, sinus pain and sore throat.    Eyes:  Negative for pain, discharge and visual disturbance.   Respiratory:  Negative for cough, choking, chest tightness and shortness of breath.    Cardiovascular:  Negative for chest pain, palpitations and leg swelling.   Gastrointestinal:  Negative for abdominal distention, abdominal pain, blood in stool, nausea and vomiting.   Endocrine: Negative for cold intolerance and heat intolerance.   Genitourinary:  Negative for dysuria, enuresis, flank pain and hematuria.   Skin:  Negative for color change, pallor and rash.   Neurological:  Negative for dizziness, facial asymmetry, " light-headedness and headaches.   Hematological:  Negative for adenopathy. Does not bruise/bleed easily.   Psychiatric/Behavioral:  Negative for agitation, behavioral problems and confusion.    All other systems reviewed and are negative.  Objective:     Vital Signs (Most Recent):  Temp: 98.7 °F (37.1 °C) (01/23/23 2339)  Pulse: 85 (01/23/23 2339)  Resp: (!) 21 (01/23/23 2339)  BP: (!) 144/94 (01/23/23 2339)  SpO2: 100 % (01/23/23 2339)   Vital Signs (24h Range):  Temp:  [98 °F (36.7 °C)-98.7 °F (37.1 °C)] 98.7 °F (37.1 °C)  Pulse:  [85-90] 85  Resp:  [14-22] 21  SpO2:  [95 %-100 %] 100 %  BP: (144-156)/(94-96) 144/94     Weight: 59.9 kg (132 lb)  Body mass index is 24.14 kg/m².    Physical Exam  Vitals and nursing note reviewed.   Constitutional:       Appearance: Normal appearance.   HENT:      Head: Normocephalic and atraumatic.      Right Ear: Tympanic membrane, ear canal and external ear normal.      Left Ear: Tympanic membrane, ear canal and external ear normal.      Nose: Nose normal.      Mouth/Throat:      Mouth: Mucous membranes are dry.      Pharynx: Oropharynx is clear.   Eyes:      Extraocular Movements: Extraocular movements intact.      Conjunctiva/sclera: Conjunctivae normal.      Pupils: Pupils are equal, round, and reactive to light.   Cardiovascular:      Rate and Rhythm: Normal rate and regular rhythm.      Pulses: Normal pulses.      Heart sounds: Normal heart sounds.   Pulmonary:      Effort: Pulmonary effort is normal.      Breath sounds: Normal breath sounds.   Abdominal:      General: Abdomen is flat. Bowel sounds are normal.      Palpations: Abdomen is soft.      Tenderness: There is abdominal tenderness. There is no guarding or rebound.   Genitourinary:     Rectum: Normal.   Musculoskeletal:         General: Normal range of motion.      Cervical back: Normal range of motion and neck supple.   Skin:     General: Skin is warm and dry.      Capillary Refill: Capillary refill takes less than 2  seconds.   Neurological:      General: No focal deficit present.      Mental Status: She is alert and oriented to person, place, and time.   Psychiatric:         Mood and Affect: Mood normal.         Behavior: Behavior normal.         Thought Content: Thought content normal.         Judgment: Judgment normal.         CRANIAL NERVES     CN III, IV, VI   Pupils are equal, round, and reactive to light.     Significant Labs: All pertinent labs within the past 24 hours have been reviewed.  A1C:   Recent Labs   Lab 08/24/22  0218 12/23/22  1413   HGBA1C 6.2 7.5*     CBC:   Recent Labs   Lab 01/23/23 1942   WBC 6.15   HGB 7.5*   HCT 21.1*   PLT 57*     CMP:   Recent Labs   Lab 01/23/23 1942   *   K 4.1   CL 89*   CO2 25   *   BUN 39*   CREATININE 4.5*   CALCIUM 8.6*   PROT 6.6   ALBUMIN 3.1*   BILITOT 2.0*   ALKPHOS 131   AST 17   ALT 13   ANIONGAP 14     Lipid Panel: No results for input(s): CHOL, HDL, LDLCALC, TRIG, CHOLHDL in the last 48 hours.  Magnesium:   Recent Labs   Lab 01/23/23 1942   MG 1.6     Troponin:   Recent Labs   Lab 01/23/23 1942 01/23/23 2117   TROPONINIHS 31.8* 30.1*     Urine Culture: No results for input(s): LABURIN in the last 48 hours.    Significant Imaging: I have reviewed all pertinent imaging results/findings within the past 24 hours.

## 2023-01-24 NOTE — H&P
[facility] - Emergency Dept  Hospital Medicine  History & Physical    Patient Name: Tabby Howard  MRN: [id number]  Patient Class: IP- Inpatient  Admission Date: 2023  Attending Physician: Roby Thompson MD  Primary Care Provider: [facility]         Patient information was obtained from patient and ER records.     Subjective:     Principal Problem:<principal problem not specified>    Chief Complaint:   Chief Complaint   Patient presents with    Abdominal Pain    Back Pain        HPI: Patient is 34 y/o AA female that presents to the ER for evaluation of abdominal pain. She has complex history of HTN, ESRD with diabetes, gastroparesis, and  anemia. She has had multiple admissions for abdominal pain. She reports that symptoms started a few days ago with worsening. She reports being med compliant. She denies any recent sick contacts. She denies any recent injury or trauma. Labs  reviewed and  H/H decreased and with associated symptoms transfusion started in ER. She denies  any fever or chills.       Past Medical History:   Diagnosis Date    CKD (chronic kidney disease), stage IV 2022    Diabetes mellitus due to underlying condition with unspecified complications 2022    Gastroparesis 2022    Heart failure with preserved ejection fraction 2022    EF 55% on 3/22    History of gastroesophageal reflux (GERD)     History of supraventricular tachycardia     Hyperkalemia 2022    Hypertensive emergency 2022    Sickle cell trait 2022    Type 2 diabetes mellitus        Past Surgical History:   Procedure Laterality Date     SECTION      x 3    COLONOSCOPY      COLONOSCOPY N/A 2022    Procedure: COLONOSCOPY;  Surgeon: Jagdeep Cedeno MD;  Location: [facility];  Service: Endoscopy;  Laterality: N/A;    ESOPHAGOGASTRODUODENOSCOPY N/A 10/18/2019    Procedure: ESOPHAGOGASTRODUODENOSCOPY (EGD);  Surgeon: Gianluca Mendez,  MD;  Location: [facility] ENDO;  Service: Endoscopy;  Laterality: N/A;    ESOPHAGOGASTRODUODENOSCOPY N/A 08/24/2022    Procedure: EGD (ESOPHAGOGASTRODUODENOSCOPY);  Surgeon: Micky Paredes III, MD;  Location: [facility] ENDO;  Service: Endoscopy;  Laterality: N/A;    ESOPHAGOGASTRODUODENOSCOPY N/A 12/5/2022    Procedure: EGD (ESOPHAGOGASTRODUODENOSCOPY);  Surgeon: Marcelo Zhong MD;  Location: [facility] [place] ENDO;  Service: Endoscopy;  Laterality: N/A;    LAPAROSCOPIC CHOLECYSTECTOMY N/A 07/30/2022    Procedure: CHOLECYSTECTOMY, LAPAROSCOPIC;  Surgeon: Grey Perez MD;  Location: [facility] [place] OR;  Service: General;  Laterality: N/A;    PLACEMENT OF DUAL-LUMEN VASCULAR CATHETER Left 07/12/2022    Procedure: INSERTION-CATHETER-JOSEPH;  Surgeon: Dionte Gan MD;  Location: [facility] [place] OR;  Service: General;  Laterality: Left;    PLACEMENT OF DUAL-LUMEN VASCULAR CATHETER Right 07/26/2022    Procedure: INSERTION-CATHETER-Hemosplit;  Surgeon: Dionte Gan MD;  Location: [facility] [place] OR;  Service: General;  Laterality: Right;       Review of patient's allergies indicates:   Allergen Reactions    Penicillins Hives       No current facility-administered medications on file prior to encounter.     Current Outpatient Medications on File Prior to Encounter   Medication Sig    HYDROmorphone (DILAUDID) 4 MG tablet Take 1 tablet (4 mg total) by mouth every 12 (twelve) hours as needed for Pain.    insulin aspart U-100 (NOVOLOG) 100 unit/mL (3 mL) InPn pen Inject 1-10 Units into the skin before meals and at bedtime as needed (Hyperglycemia). Max daily dose 40 units.    insulin detemir U-100 (LEVEMIR FLEXTOUCH) 100 unit/mL (3 mL) SubQ InPn pen Inject 9 Units into the skin once daily.    insulin glargine 100 units/mL SubQ pen Inject 28 Units into the skin once daily.    insulin lispro 100 unit/mL pen Inject 8 Units into the skin 3 (three) times daily with meals.    isosorbide mononitrate (IMDUR) 60 MG 24 hr tablet Take 2 tablets (120 mg total) by  "mouth once daily.    levETIRAcetam (KEPPRA) 500 MG Tab Take 1 tablet (500 mg total) by mouth 2 (two) times daily.    losartan-hydrochlorothiazide 100-12.5 mg (HYZAAR) 100-12.5 mg Tab Take 1 tablet by mouth once daily.    mirtazapine (REMERON SOL-TAB) 15 MG disintegrating tablet Take 1 tablet (15 mg total) by mouth nightly.    ondansetron (ZOFRAN) 4 MG tablet Take 1 tablet (4 mg total) by mouth every 8 (eight) hours as needed for Nausea.    pantoprazole (PROTONIX) 40 MG tablet Take 40 mg by mouth once daily.    albuterol (PROVENTIL/VENTOLIN HFA) 90 mcg/actuation inhaler Inhale 2 puffs into the lungs every 6 (six) hours as needed for Wheezing. Rescue    amLODIPine (NORVASC) 10 MG tablet Take 1 tablet (10 mg total) by mouth once daily.    carvediloL (COREG) 25 MG tablet Take 1 tablet (25 mg total) by mouth 2 (two) times daily.    cloNIDine 0.2 mg/24 hr td ptwk (CATAPRES) 0.2 mg/24 hr Place 1 patch onto the skin every 7 days.    ferrous sulfate 325 (65 FE) MG EC tablet Take 325 mg by mouth once daily.    fluconazole (DIFLUCAN) 150 MG Tab Take 150 mg by mouth once daily.    FLUoxetine 20 MG capsule Take 1 capsule (20 mg total) by mouth once daily.    furosemide (LASIX) 40 MG tablet Take 40 mg by mouth 2 (two) times a day.    gabapentin (NEURONTIN) 100 MG capsule Take 1 capsule (100 mg total) by mouth 2 (two) times daily.    hydrALAZINE (APRESOLINE) 100 MG tablet Take 1 tablet (100 mg total) by mouth every 8 (eight) hours.    pen needle, diabetic (BD ULTRA-FINE MAGALIE PEN NEEDLE) 32 gauge x 5/32" Ndle Inject into the skin 5 times per day with insulin    [DISCONTINUED] atenoloL (TENORMIN) 50 MG tablet Take 1 tablet (50 mg total) by mouth every other day.    [DISCONTINUED] omeprazole (PRILOSEC) 20 MG capsule Take 2 capsules (40 mg total) by mouth once daily. for 10 days    [DISCONTINUED] torsemide (DEMADEX) 20 MG Tab Take 1 tablet (20 mg total) by mouth 2 (two) times a day. (Patient taking differently: Take " 20 mg by mouth once daily.)     Family History       Problem Relation (Age of Onset)    Diabetes Mother, Father          Tobacco Use    Smoking status: Never    Smokeless tobacco: Never   Substance and Sexual Activity    Alcohol use: Not Currently    Drug use: No    Sexual activity: Not Currently     Partners: Male     Birth control/protection: I.U.D.     Review of Systems   Constitutional:  Negative for chills, diaphoresis and fatigue.   HENT:  Negative for congestion, ear pain, sinus pain and sore throat.    Eyes:  Negative for pain, discharge and visual disturbance.   Respiratory:  Negative for cough, choking, chest tightness and shortness of breath.    Cardiovascular:  Negative for chest pain, palpitations and leg swelling.   Gastrointestinal:  Negative for abdominal distention, abdominal pain, blood in stool, nausea and vomiting.   Endocrine: Negative for cold intolerance and heat intolerance.   Genitourinary:  Negative for dysuria, enuresis, flank pain and hematuria.   Skin:  Negative for color change, pallor and rash.   Neurological:  Negative for dizziness, facial asymmetry, light-headedness and headaches.   Hematological:  Negative for adenopathy. Does not bruise/bleed easily.   Psychiatric/Behavioral:  Negative for agitation, behavioral problems and confusion.    All other systems reviewed and are negative.  Objective:     Vital Signs (Most Recent):  Temp: 98.7 °F (37.1 °C) (01/23/23 2339)  Pulse: 85 (01/23/23 2339)  Resp: (!) 21 (01/23/23 2339)  BP: (!) 144/94 (01/23/23 2339)  SpO2: 100 % (01/23/23 2339)   Vital Signs (24h Range):  Temp:  [98 °F (36.7 °C)-98.7 °F (37.1 °C)] 98.7 °F (37.1 °C)  Pulse:  [85-90] 85  Resp:  [14-22] 21  SpO2:  [95 %-100 %] 100 %  BP: (144-156)/(94-96) 144/94     Weight: 59.9 kg (132 lb)  Body mass index is 24.14 kg/m².    Physical Exam  Vitals and nursing note reviewed.   Constitutional:       Appearance: Normal appearance.   HENT:      Head: Normocephalic and atraumatic.       Right Ear: Tympanic membrane, ear canal and external ear normal.      Left Ear: Tympanic membrane, ear canal and external ear normal.      Nose: Nose normal.      Mouth/Throat:      Mouth: Mucous membranes are dry.      Pharynx: Oropharynx is clear.   Eyes:      Extraocular Movements: Extraocular movements intact.      Conjunctiva/sclera: Conjunctivae normal.      Pupils: Pupils are equal, round, and reactive to light.   Cardiovascular:      Rate and Rhythm: Normal rate and regular rhythm.      Pulses: Normal pulses.      Heart sounds: Normal heart sounds.   Pulmonary:      Effort: Pulmonary effort is normal.      Breath sounds: Normal breath sounds.   Abdominal:      General: Abdomen is flat. Bowel sounds are normal.      Palpations: Abdomen is soft.      Tenderness: There is abdominal tenderness. There is no guarding or rebound.   Genitourinary:     Rectum: Normal.   Musculoskeletal:         General: Normal range of motion.      Cervical back: Normal range of motion and neck supple.   Skin:     General: Skin is warm and dry.      Capillary Refill: Capillary refill takes less than 2 seconds.   Neurological:      General: No focal deficit present.      Mental Status: She is alert and oriented to person, place, and time.   Psychiatric:         Mood and Affect: Mood normal.         Behavior: Behavior normal.         Thought Content: Thought content normal.         Judgment: Judgment normal.         CRANIAL NERVES     CN III, IV, VI   Pupils are equal, round, and reactive to light.     Significant Labs: All pertinent labs within the past 24 hours have been reviewed.  A1C:   Recent Labs   Lab 08/24/22  0218 12/23/22  1413   HGBA1C 6.2 7.5*     CBC:   Recent Labs   Lab 01/23/23 1942   WBC 6.15   HGB 7.5*   HCT 21.1*   PLT 57*     CMP:   Recent Labs   Lab 01/23/23 1942   *   K 4.1   CL 89*   CO2 25   *   BUN 39*   CREATININE 4.5*   CALCIUM 8.6*   PROT 6.6   ALBUMIN 3.1*   BILITOT 2.0*   ALKPHOS 131    AST 17   ALT 13   ANIONGAP 14     Lipid Panel: No results for input(s): CHOL, HDL, LDLCALC, TRIG, CHOLHDL in the last 48 hours.  Magnesium:   Recent Labs   Lab 01/23/23 1942   MG 1.6     Troponin:   Recent Labs   Lab 01/23/23 1942 01/23/23 2117   TROPONINIHS 31.8* 30.1*     Urine Culture: No results for input(s): LABURIN in the last 48 hours.    Significant Imaging: I have reviewed all pertinent imaging results/findings within the past 24 hours.    Assessment/Plan:     Anemia of chronic kidney failure, stage 5    Nephrology consult pending   2 units PRBC;s  Check stools for occult blood    ESRD (end stage renal disease) on dialysis  Repeat am labs  Nephrology consult    Gastroparesis - suspected, unconfirmed  Abdominal pain  GI consult   Pain management low dosing        Type 2 diabetes mellitus with chronic kidney disease on chronic dialysis, with long-term current use of insulin  hgb A1c elevated sliding  Scale  Low dosing  Protocol  Continue home meds as ordered  Nephrology consult pending  Blood sugar checks POCT        VTE Risk Mitigation (From admission, onward)    None             REINA Lawrence  Department of Hospital Medicine   [facility] - Emergency Dept

## 2023-01-24 NOTE — ASSESSMENT & PLAN NOTE
hgb A1c elevated sliding  Scale  Low dosing  Protocol  Continue home meds as ordered  Nephrology consult pending  Blood sugar checks POCT

## 2023-01-24 NOTE — ED PROVIDER NOTES
Encounter Date: 1/23/2023       History     Chief Complaint   Patient presents with    Abdominal Pain    Back Pain     33-year-old female with a history of chronic kidney disease on dialysis Tuesdays Thursdays and Saturdays, history of diabetes, hypertension, GERD, diabetic gastroparesis with multiple ED presentations and need for admission secondary to multiple medical problems and comorbidities who presents complaining of abdominal pain chest pain and headache.  The patient was recently discharged from the hospital after admission for similar symptoms.  She was diagnosed with a pericardial effusion.  She does have a chronic recurrent abdominal pain for which she is had multiple ED presentations, the pain is generalized and does radiate to the back which is been the normal pattern.  She reports she was feeling somewhat better prior to discharge but has started having recurrence of symptoms.  She reports mild but increasing shortness of breath with exertion.  She was dialyzed on Saturday and has not missed any dialysis episodes by her report.  She denies fever.  She has had a dry cough.  She is had intermittent nausea and vomiting.  She denies any gastrointestinal bleeding.  She has had generalized nonfocal weakness and fatigue and reports several episodes of feeling very lightheaded with standing.      Review of patient's allergies indicates:   Allergen Reactions    Penicillins Hives     Past Medical History:   Diagnosis Date    CKD (chronic kidney disease), stage IV 4/12/2022    Diabetes mellitus due to underlying condition with unspecified complications 4/12/2022    Gastroparesis 4/12/2022    Heart failure with preserved ejection fraction 4/12/2022    EF 55% on 3/22    History of gastroesophageal reflux (GERD)     History of supraventricular tachycardia     Hyperkalemia 7/7/2022    Hypertensive emergency 7/8/2022    Sickle cell trait 4/12/2022    Type 2 diabetes mellitus      Past Surgical History:   Procedure  Laterality Date     SECTION      x 3    COLONOSCOPY      COLONOSCOPY N/A 2022    Procedure: COLONOSCOPY;  Surgeon: Jagdeep Cedeno MD;  Location: [facility];  Service: Endoscopy;  Laterality: N/A;    ESOPHAGOGASTRODUODENOSCOPY N/A 10/18/2019    Procedure: ESOPHAGOGASTRODUODENOSCOPY (EGD);  Surgeon: Gianluca Mendez MD;  Location: [facility] ENDO;  Service: Endoscopy;  Laterality: N/A;    ESOPHAGOGASTRODUODENOSCOPY N/A 2022    Procedure: EGD (ESOPHAGOGASTRODUODENOSCOPY);  Surgeon: Micky Paredes III, MD;  Location: [facility];  Service: Endoscopy;  Laterality: N/A;    ESOPHAGOGASTRODUODENOSCOPY N/A 2022    Procedure: EGD (ESOPHAGOGASTRODUODENOSCOPY);  Surgeon: Marcelo Zhong MD;  Location: [facility];  Service: Endoscopy;  Laterality: N/A;    LAPAROSCOPIC CHOLECYSTECTOMY N/A 2022    Procedure: CHOLECYSTECTOMY, LAPAROSCOPIC;  Surgeon: Grey Perez MD;  Location: [facility];  Service: General;  Laterality: N/A;    PLACEMENT OF DUAL-LUMEN VASCULAR CATHETER Left 2022    Procedure: INSERTION-CATHETER-JOSEPH;  Surgeon: Dionte Gan MD;  Location: [facility] OR;  Service: General;  Laterality: Left;    PLACEMENT OF DUAL-LUMEN VASCULAR CATHETER Right 2022    Procedure: INSERTION-CATHETER-Hemosplit;  Surgeon: Dionte Gan MD;  Location: [facility];  Service: General;  Laterality: Right;     Family History   Problem Relation Age of Onset    Diabetes Mother     Diabetes Father      Social History     Tobacco Use    Smoking status: Never    Smokeless tobacco: Never   Substance Use Topics    Alcohol use: Not Currently    Drug use: No     Review of Systems   Constitutional:  Positive for activity change, appetite change, fatigue and unexpected weight change. Negative for fever.   HENT: Negative.     Eyes: Negative.    Respiratory:  Positive for cough and shortness of breath.    Cardiovascular: Negative.    Gastrointestinal:  Positive for abdominal pain, nausea and vomiting. Negative for anal  bleeding, blood in stool, constipation and diarrhea.   Genitourinary: Negative.         Anuric, on dialysis.   Musculoskeletal:  Positive for myalgias. Negative for neck pain and neck stiffness.   Skin:  Positive for pallor.   Neurological:  Positive for weakness (nonfocal generalized weakness and fatigue), light-headedness and headaches. Negative for syncope, speech difficulty and numbness.   All other systems reviewed and are negative.    Physical Exam     Initial Vitals [01/23/23 1821]   BP Pulse Resp Temp SpO2   (!) 147/96 90 18 98 °F (36.7 °C) 98 %      MAP       --         Physical Exam    Nursing note and vitals reviewed.  Constitutional: She appears listless. She is not diaphoretic. She is active and cooperative.  Non-toxic appearance. She does not have a sickly appearance. She appears ill. No distress.   HENT:   Head: Normocephalic and atraumatic.   Right Ear: Tympanic membrane normal.   Left Ear: Tympanic membrane normal.   Nose: Nose normal.   Mouth/Throat: Uvula is midline and oropharynx is clear and moist. Mucous membranes are dry. No oral lesions. No uvula swelling. No oropharyngeal exudate, posterior oropharyngeal edema, posterior oropharyngeal erythema or tonsillar abscesses.   Eyes: Conjunctivae, EOM and lids are normal. Pupils are equal, round, and reactive to light. No scleral icterus.   Neck: Trachea normal and phonation normal. Neck supple. No thyroid mass and no thyromegaly present. No stridor present. No JVD present.   Normal range of motion.   Full passive range of motion without pain.     Cardiovascular:  Normal rate, regular rhythm, normal heart sounds, intact distal pulses and normal pulses.     Exam reveals no gallop, no distant heart sounds, no friction rub and no decreased pulses.       No murmur heard.  Pulmonary/Chest: Effort normal and breath sounds normal. No accessory muscle usage or stridor. No tachypnea. No respiratory distress. She has no decreased breath sounds. She has no  wheezes. She has no rhonchi. She has no rales.   Abdominal: Abdomen is soft and flat. Bowel sounds are normal. She exhibits no distension, no pulsatile midline mass and no mass. No signs of injury. There is generalized abdominal tenderness. No hernia.   No right CVA tenderness.  No left CVA tenderness. There is guarding. There is no rigidity, no rebound, no tenderness at McBurney's point and negative Perez's sign. negative psoas sign and negative Rovsing's sign  Musculoskeletal:         General: No tenderness or edema. Normal range of motion.      Right hand: Normal. Normal range of motion. Normal strength. Normal sensation. Normal capillary refill. Normal pulse.      Left hand: Normal. Normal range of motion. Normal strength. Normal sensation. Normal capillary refill. Normal pulse.      Cervical back: Normal, full passive range of motion without pain, normal range of motion and neck supple. No edema, erythema, rigidity or bony tenderness. No spinous process tenderness or muscular tenderness. Normal range of motion.      Thoracic back: Normal. No bony tenderness. Normal range of motion.      Lumbar back: Normal. No bony tenderness. Normal range of motion.      Right foot: Normal. Normal range of motion and normal capillary refill. No tenderness or bony tenderness. Normal pulse.      Left foot: Normal. Normal range of motion and normal capillary refill. No tenderness or bony tenderness. Normal pulse.      Comments: Pulses 2+ throughout, no extremity abnormalities     Neurological: She is oriented to person, place, and time. She has normal strength. She appears listless. She displays normal reflexes. No cranial nerve deficit or sensory deficit. Gait normal.   No focal deficits.   Skin: Skin is warm, dry and intact. Capillary refill takes less than 2 seconds. No ecchymosis, no petechiae and no rash noted. No erythema. No pallor.   Skin is dry with poor turgor.   Psychiatric: She has a normal mood and affect. Her  speech is normal and behavior is normal. Judgment and thought content normal. Cognition and memory are normal.       ED Course   Procedures  Labs Reviewed   CBC W/ AUTO DIFFERENTIAL - Abnormal; Notable for the following components:       Result Value    RBC 2.70 (*)     Hemoglobin 7.5 (*)     Hematocrit 21.1 (*)     MCV 78 (*)     RDW 15.9 (*)     Platelets 57 (*)     Lymph # 0.9 (*)     Gran % 78.3 (*)     Lymph % 15.0 (*)     All other components within normal limits    Narrative:     For upper or mid abdominal pain.   COMPREHENSIVE METABOLIC PANEL - Abnormal; Notable for the following components:    Sodium 128 (*)     Chloride 89 (*)     Glucose 484 (*)     BUN 39 (*)     Creatinine 4.5 (*)     Calcium 8.6 (*)     Albumin 3.1 (*)     Total Bilirubin 2.0 (*)     eGFR 12.5 (*)     All other components within normal limits    Narrative:     For upper or mid abdominal pain.     Glucose critical result(s) called and verbal readback obtained   from Gary Angulo RN ER  by MS1 01/23/2023 20:48   TROPONIN I HIGH SENSITIVITY - Abnormal; Notable for the following components:    Troponin I High Sensitivity 31.8 (*)     All other components within normal limits    Narrative:     For upper or mid abdominal pain.   TROPONIN I HIGH SENSITIVITY - Abnormal; Notable for the following components:    Troponin I High Sensitivity 30.1 (*)     All other components within normal limits   B-TYPE NATRIURETIC PEPTIDE - Abnormal; Notable for the following components:    BNP 2,111 (*)     All other components within normal limits    Narrative:     For upper or mid abdominal pain.   POCT GLUCOSE - Abnormal; Notable for the following components:    POC Glucose 511 (*)     All other components within normal limits   POCT GLUCOSE - Abnormal; Notable for the following components:    POC Glucose 497 (*)     All other components within normal limits   POCT GLUCOSE - Abnormal; Notable for the following components:    POC Glucose 378 (*)     All  other components within normal limits   POCT GLUCOSE - Abnormal; Notable for the following components:    POC Glucose 358 (*)     All other components within normal limits   LIPASE    Narrative:     For upper or mid abdominal pain.   INFLUENZA A AND B ANTIGEN    Narrative:     Specimen Source->Nasopharyngeal Swab   CK    Narrative:     For upper or mid abdominal pain.   MAGNESIUM    Narrative:     For upper or mid abdominal pain.   TSH    Narrative:     For upper or mid abdominal pain.   SARS-COV-2 RNA AMPLIFICATION, QUAL   DRUGS OF ABUSE SCREEN, BLOOD   DRUGS OF ABUSE SCREEN, BLOOD   COMPREHENSIVE METABOLIC PANEL   CBC WITHOUT DIFFERENTIAL   TYPE & SCREEN   POCT GLUCOSE MONITORING CONTINUOUS   POCT GLUCOSE MONITORING CONTINUOUS   POCT GLUCOSE MONITORING CONTINUOUS   POCT GLUCOSE MONITORING CONTINUOUS   POCT GLUCOSE MONITORING CONTINUOUS   PREPARE RBC SOFT   PREPARE RBC SOFT        ECG Results              EKG 12-lead (In process)  Result time 01/24/23 05:17:02      In process by Interface, Lab In [facility] (01/24/23 05:17:02)                   Narrative:    Test Reason : R07.9,    Vent. Rate : 089 BPM     Atrial Rate : 089 BPM     P-R Int : 184 ms          QRS Dur : 080 ms      QT Int : 410 ms       P-R-T Axes : 027 -30 042 degrees     QTc Int : 498 ms    Normal sinus rhythm  Left axis deviation  Minimal voltage criteria for LVH, may be normal variant  Prolonged QT  Abnormal ECG  When compared with ECG of 12-JAN-2023 13:54,  No significant change was found    Referred By: [email or web address]   SELF           Confirmed By:                                   Imaging Results              CT Chest Abdomen Pelvis Without Contrast (XPD) (Final result)  Result time 01/23/23 21:38:00      Final result by Jagdeep Colbert MD (01/23/23 21:38:00)                   Narrative:    CT CHEST ABDOMEN PELVIS WITHOUT IV CONTRAST    COMPARISONS: October 29, 2022    ADDITIONAL PERTINENT HISTORY:  Sepsis.    TECHNIQUE: Multiple axial images are  obtained from the lung apices through the upper abdomen without the IV administration of contrast material.  One of the following dose optimization techniques was utilized in the performance of this exam: Automated exposure control; adjustment of the mA and/or kV according to the patient's size; or use of an iterative  reconstruction technique.  Specific details can be referenced in the facility's radiology CT exam operational policy.    FINDINGS:    Lung parenchyma: Mild atelectatic change involving both lung bases. Otherwise negative    Pleural spaces: Negative.    Heart, mediastinum and evaristo: Persistent moderately large pericardial effusion with also mild underlying cardiomegaly similar to previous exam.    Cardiopulmonary vasculature: Negative.    Central airways:  Negative    Thyroid, supra- clavicular, axillary regions: 9 mm low-attenuation lesion involving the left lobe of the thyroid which merits no further follow-up given size less than 1 cm.    Surrounding soft tissues: Mild body wall edema similar to previous exam.    Osseous structures: Negative.    IMPRESSION:  1. Mild bibasilar atelectatic change.  2. Persistent moderate pericardial effusion with underlying mild cardiomegaly.  3. No other acute process involving the chest.    CT ABDOMEN PELVIS WITHOUT IV CONTRAST    COMPARISONS: January 5, 2023.    ADDITIONAL PERTINENT HISTORY: Sepsis with back pain.    TECHNIQUE: Multiple axial images are obtained from the lung bases through the lesser trochanters without IV contrast.  One of the following dose optimization techniques was utilized in the performance of this exam: Automated exposure control; adjustment of the mA and/or kV according to the patient's size; or use of an iterative  reconstruction technique.  Specific details can be referenced in the facility's radiology CT exam operational policy.    FINDINGS:    Free air and free fluid: Continued findings of a small amount of free fluid within the right  pericolic gutter..    Liver: Negative for a noncontrasted examination..    Spleen: Negative for a noncontrasted examination.    Adrenal glands: Negative.    Kidneys, ureters and urinary bladder: Negative.    Pancreas: Grossly negative.    Gallbladder: Surgically absent..    Bowel and mesentery: Negative including a normal-appearing appendix in the right lower quadrant..    Lymph node assessment: Negative.    Abdominal pelvic vasculature: Atherosclerotic disease of the arterial vasculature of the abdomen and pelvis..    Intrapelvic contents: Negative..    Surrounding soft tissues: Mild body wall edema. Otherwise negative.    Osseous structures: Negative.    IMPRESSION:  1. Chronic findings within the abdomen and pelvis.  2. No acute abnormality when compared to recent exam.    Electronically signed by:  Jagdeep Colbert MD  1/23/2023 9:38 PM CST Workstation: [id number]                                     CT Head Without Contrast (Final result)  Result time 01/23/23 21:29:55      Final result by Jagdeep Colbert MD (01/23/23 21:29:55)                   Narrative:    Head CT scan without contrast    COMPARISONS: Head CT scan without contrast dated November 7, 2022    ADDITIONAL PERTINENT HISTORY: Headache, chronic    TECHNIQUE:  Multiple axial images were obtained from the skull base to the vertex without IV contrast. One of the following dose optimization techniques was utilized in the performance of this exam: Automated exposure control; adjustment of the mA and/or kV according to the patient's size; or use of an iterative  reconstruction technique.  Specific details can be referenced in the facility's radiology CT exam operational policy.    FINDINGS:    Midline shift: Negative    Ventricles:  Negative    Brain parenchyma:  Negative    Extra-axial spaces:  Negative    Intracranial vasculature:  Negative    Osseous structures:  Negative    Paranasal sinuses and mastoid air cells:  Moderate mucosal thickening involving both  maxillary sinuses.    Surrounding soft tissues and orbits:  Negative      IMPRESSION:  1. No acute intracranial pathology.  2. Underlying paranasal sinus disease.    Electronically signed by:  Jagdeep Colbert MD  1/23/2023 9:29 PM CST Workstation: [id number]                                     X-Ray Chest AP Portable (Final result)  Result time 01/23/23 21:30:55      Final result by Jagdeep Colbert MD (01/23/23 21:30:55)                   Narrative:    XR CHEST 1 VIEW    COMPARISONS: Single view chest dated January 12, 2023    ADDITIONAL PERTINENT HISTORY: Abdominal pain with back pain    FINDINGS:    Life-support:  Right internal jugular venous dual-lumen catheter with its tips in the right atrium.    Cardiomediastinal silhouette:  Stable moderate cardiomegaly.    Pulmonary vasculature:  Negative.    Lung fields:  Negative.    Pleural spaces: Negative.    Osseous structures:  Negative.    Surrounding soft tissues:  Negative.      IMPRESSION:  1. Moderate cardiomegaly.  2. No other acute cardiopulmonary disease.    Electronically signed by:  Jagdeep Colbert MD  1/23/2023 9:30 PM CST Workstation: [id number]                                     Medications   0.9%  NaCl infusion (for blood administration) (has no administration in time range)   carvediloL tablet 25 mg (25 mg Oral Given 1/24/23 1119)   ondansetron disintegrating tablet 4 mg (has no administration in time range)   amLODIPine tablet 10 mg (10 mg Oral Given 1/24/23 1119)   FLUoxetine capsule 20 mg (20 mg Oral Given 1/24/23 1119)   hydrALAZINE tablet 100 mg (100 mg Oral Given 1/24/23 0536)   isosorbide mononitrate 24 hr tablet 120 mg (120 mg Oral Given 1/24/23 1119)   levETIRAcetam tablet 500 mg (500 mg Oral Given 1/24/23 1119)   pantoprazole EC tablet 40 mg (40 mg Oral Given 1/24/23 0536)   furosemide tablet 40 mg (40 mg Oral Given 1/24/23 1119)   mirtazapine tablet 15 mg (15 mg Oral Given 1/24/23 0036)   insulin detemir U-100 pen 28 Units (28 Units Subcutaneous  Given 1/24/23 1124)   ferrous sulfate tablet 1 each (1 each Oral Given 1/24/23 1119)   glucagon (human recombinant) injection 1 mg (has no administration in time range)   albuterol nebulizer solution 2.5 mg (has no administration in time range)   losartan tablet 100 mg (100 mg Oral Given 1/24/23 1119)     And   hydroCHLOROthiazide tablet 12.5 mg (12.5 mg Oral Given 1/24/23 1119)   cloNIDine 0.3 mg/24 hr td ptwk 1 patch (has no administration in time range)   cloNIDine tablet 0.1 mg (0.1 mg Oral Given 1/24/23 0628)   glucose chewable tablet 16 g (has no administration in time range)   glucose chewable tablet 24 g (has no administration in time range)   dextrose 10% bolus 125 mL 125 mL (has no administration in time range)   dextrose 10% bolus 250 mL 250 mL (has no administration in time range)   glucagon (human recombinant) injection 1 mg (has no administration in time range)   insulin aspart U-100 pen 1-10 Units (10 Units Subcutaneous Given 1/24/23 1125)   mupirocin 2 % ointment (1 g Nasal Given 1/24/23 1119)   epoetin teresa-epbx injection 10,000 Units (has no administration in time range)   0.9%  NaCl infusion (0 mLs Intravenous Stopped 1/23/23 2206)   HYDROmorphone injection 1 mg (1 mg Intravenous Given 1/23/23 1946)   ondansetron injection 4 mg (4 mg Intravenous Given 1/23/23 1946)   insulin regular injection 5 Units 0.05 mL (5 Units Intravenous Given 1/23/23 2202)   HYDROmorphone injection 1 mg (1 mg Intravenous Given 1/23/23 2346)     Medical Decision Making:   Clinical Tests:   Lab Tests: Reviewed  Radiological Study: Reviewed  Medical Tests: Reviewed  ED Management:  Patient presents with abdominal pain headache chest pain multiple episodes of emesis.  Also reports generalized nonfocal weakness and fatigue and feeling very lightheaded with standing.  Patient does appear dehydrated.  Gentle IV fluids started as per history of renal failure/end-stage renal disease and patient is on dialysis.  Patient does appear  pale.  Hemoglobin noted to be 7.5.  Likely anemia of chronic disease, denies gastrointestinal bleeding.  As patient is symptomatic with multiple comorbidities and current symptoms could be partially attributed to symptomatic anemia transfusion has been ordered.  Patient does report having a history of anemia and blood transfusions in the past.  [facility]    Patient presents for emergent evaluation of acute intractable nausea vomiting headache chest pain and shortness of breath that poses a threat to life and/or bodily function.    In the ED patient found to have acute worsening of chronic anemia requiring transfusions.    I ordered labs and personally reviewed them.  Labs significant for significant anemia noted, history of chronic anemia, hemoglobin has decreased to 7.5.    I ordered X-rays and personally reviewed them and reviewed the radiologist interpretation.  Xray significant for no acute changes per Radiology.    I ordered EKG and personally reviewed it.  EKG significant for no ST segment elevation.    I ordered CT scan and personally reviewed it and reviewed the radiologist interpretation.  CT significant for no bowel obstruction, no acute process per Radiology read see report.      Admission [facility]  I discussed the patient presentation labs, ekg, X-rays, CT findings with the consultant for hospital medicine.  (speciality).    Patient was managed in the ED with IV pain medications antiemetics and packed red blood cells.    The response to treatment was improved..    Patient required emergent consultation to hospitalist Medicine (admitting physician) for admission.               Attending Attestation:         Attending Critical Care:   Critical Care Times:   Direct Patient Care (initial evaluation, reassessments, and time considering the case)................................................................12 minutes.   Additional History from reviewing old medical records or taking additional history from the family,  EMS, PCP, etc.......................4 minutes.   Ordering, Reviewing, and Interpreting Diagnostic Studies...............................................................................................................7 minutes.   Documentation..................................................................................................................................................................................6 minutes.   Consultation with other Physicians. .................................................................................................................................................5 minutes.   Consultation with the patient's family directly relating to the patient's condition, care, and DNR status (when patient unable)......4 minutes.   ==============================================================  Total Critical Care Time - exclusive of procedural time: 38 minutes.  ==============================================================        ED Course as of 01/24/23 1158 Tue Jan 24, 2023   1156 POC Glucose(!): 358 [AR]   1156 Troponin I High Sensitivity(!): 30.1 [AR]   1156 BUN(!): 39 [AR]   1156 Creatinine(!): 4.5 [AR]   1156 Sodium(!): 128 [AR]   1156 Hemoglobin(!): 7.5 [AR]   1156 Hematocrit(!): 21.1 [AR]   1156 Platelets(!): 57 [AR]      ED Course User Index  [AR] Jazmin Moore MD                 Clinical Impression:   Final diagnoses:  [R07.9] Chest pain  [D64.9] Symptomatic anemia (Primary)        ED Disposition Condition    Admit Stable                Jazmin Moore MD  01/24/23 [id number]

## 2023-01-24 NOTE — PROGRESS NOTES
Automatic Inhaler to Nebulizer Interchange    albuterol (Ventolin, ProAir, or Proventil)  mcg given multiple times per day changed to albuterol 2.5 mg Q6H PRN Wheezing  per [facility] Automatic Therapeutic Substitutions Protocol.    Please contact pharmacy at extension [phone] with any questions.     Thank you,   Miah Maloney

## 2023-01-24 NOTE — PLAN OF CARE
Formerly [facility], [facility] - Emergency Dept  Initial Discharge Assessment       Primary Care Provider: [facility]    Admission Diagnosis: Symptomatic anemia [D64.9]    Admission Date: 1/23/2023  Expected Discharge Date: 1/24/2023    Discharge Barriers Identified: None    Pt lives at listed address with her dad and dependent children. PCP [facility]. and pharmacy [facility]. DME rw, glucometer. Pt takes Eliquis. Pt does HD at [facility]. Pt's dad brings her to HD. DadGarcia, is POA. Pt with multiple recent admits. SW to continue following for case management needs.    Payor: MEDICAID / Plan: [place] / Product Type: Managed Medicaid /     Extended Emergency Contact Information  Primary Emergency Contact: Garcia Howard  Mobile Phone: [phone]  Relation: Father  Preferred language: English   needed? No  Secondary Emergency Contact: Tanya Howard  Mobile Phone: [phone]  Relation: Step parent  Preferred language: English   needed? No    Discharge Plan A: Home with family  Discharge Plan B: Home with family      [facility] - [place], [place] - [address] [address]  [address]  [facility] [address]  Phone: [phone] Fax: [phone]    [id number] [place], [place] - [address] AT [facility]  [address]  [facility] [address]  Phone: [phone] Fax: [phone]    [place], [place] - [address]  [address]  [facility] [address]  Phone: [phone] Fax: [phone]      Initial Assessment (most recent)       Adult Discharge Assessment - 01/24/23 1143          Discharge Assessment    Assessment Type Discharge Planning Assessment     Confirmed/corrected address, phone number and insurance Yes     Confirmed Demographics Correct on Facesheet     Source of Information health record     Communicated TATIANA with  patient/caregiver Date not available/Unable to determine     Reason For Admission back pain     People in Home parent(s)     Facility Arrived From: home     Do you expect to return to your current living situation? Yes     Do you have help at home or someone to help you manage your care at home? Yes     Who are your caregiver(s) and their phone number(s)? parents     Prior to hospitilization cognitive status: Alert/Oriented     Current cognitive status: Alert/Oriented     Walking or Climbing Stairs ambulation difficulty, requires equipment     Mobility Management rw     Equipment Currently Used at Home walker, rolling     Readmission within 30 days? Yes     Patient currently being followed by outpatient case management? No     Do you currently have service(s) that help you manage your care at home? No     Do you take prescription medications? Yes     Do you have prescription coverage? Yes     Coverage medicaid     Do you have any problems affording any of your prescribed medications? No     Is the patient taking medications as prescribed? yes     Who is going to help you get home at discharge? father     How do you get to doctors appointments? family or friend will provide     Are you on dialysis? Yes     Dialysis Name and Scheduled days [facility] [address] per medical chart     Do you take coumadin? No     Discharge Plan A Home with family     Discharge Plan B Home with family     DME Needed Upon Discharge  none     Discharge Barriers Identified None

## 2023-01-24 NOTE — CONSULTS
INPATIENT NEPHROLOGY CONSULT   [facility] NEPHROLOGY    Tabby Howard  01/24/2023    Reason for consultation:    esrd    Chief Complaint:   Chief Complaint   Patient presents with    Abdominal Pain    Back Pain          History of Present Illness:     Per H and P     Patient is 32 y/o AA female that presents to the ER for evaluation of abdominal pain. She has complex history of HTN, ESRD with diabetes, gastroparesis, and  anemia. She has had multiple admissions for abdominal pain. She reports that symptoms started a few days ago with worsening. She reports being med compliant. She denies any recent sick contacts. She denies any recent injury or trauma. Labs  reviewed and  H/H decreased and with associated symptoms transfusion started in ER. She denies  any fever or chills.    1/24  Patient seen on hemodialysis for uremic clearance and ultrafiltration.        Plan of Care:       Assessment:    esrd  --continue dialysis per routine  --fluid restrict  --renal dose medication per routine  --continue outpt medication  --continue binders with meals    Anemia  --erythropoiesis stimulating agent with renal replacement therapy  --transfuse with hd    H/O Hyperphosphatemia  --renal diet  --continue binders    Hypertension  --uf with hd  --fluid restrict  --low salt diet  --continue home medication    Abdominal pain  --please note this is this patient's 20th admission in the last year  --judicious opiates.   She has displayed drug seeking behavior on numerous occasions in the past           Thank you for allowing us to participate in this patient's care. We will continue to follow.    Vital Signs:  Temp Readings from Last 3 Encounters:   01/24/23 98.4 °F (36.9 °C)   01/13/23 98.9 °F (37.2 °C)   01/07/23 98.7 °F (37.1 °C) (Oral)       Pulse Readings from Last 3 Encounters:   01/24/23 86   01/13/23 87   01/08/23 87       BP Readings from Last 3 Encounters:   01/24/23 (!) 177/113   01/13/23 133/82   01/08/23 118/68        Weight:  Wt Readings from Last 3 Encounters:   23 59.9 kg (132 lb)   23 52.8 kg (116 lb 6.5 oz)   23 59.9 kg (132 lb)       Past Medical & Surgical History:  Past Medical History:   Diagnosis Date    CKD (chronic kidney disease), stage IV 2022    Diabetes mellitus due to underlying condition with unspecified complications 2022    Gastroparesis 2022    Heart failure with preserved ejection fraction 2022    EF 55% on 3/22    History of gastroesophageal reflux (GERD)     History of supraventricular tachycardia     Hyperkalemia 2022    Hypertensive emergency 2022    Sickle cell trait 2022    Type 2 diabetes mellitus        Past Surgical History:   Procedure Laterality Date     SECTION      x 3    COLONOSCOPY      COLONOSCOPY N/A 2022    Procedure: COLONOSCOPY;  Surgeon: Jagdeep Cedeno MD;  Location: [facility];  Service: Endoscopy;  Laterality: N/A;    ESOPHAGOGASTRODUODENOSCOPY N/A 10/18/2019    Procedure: ESOPHAGOGASTRODUODENOSCOPY (EGD);  Surgeon: Gianluca Mendez MD;  Location: [facility];  Service: Endoscopy;  Laterality: N/A;    ESOPHAGOGASTRODUODENOSCOPY N/A 2022    Procedure: EGD (ESOPHAGOGASTRODUODENOSCOPY);  Surgeon: Micky Paredes III, MD;  Location: [facility];  Service: Endoscopy;  Laterality: N/A;    ESOPHAGOGASTRODUODENOSCOPY N/A 2022    Procedure: EGD (ESOPHAGOGASTRODUODENOSCOPY);  Surgeon: Marcelo Zhong MD;  Location: [facility];  Service: Endoscopy;  Laterality: N/A;    LAPAROSCOPIC CHOLECYSTECTOMY N/A 2022    Procedure: CHOLECYSTECTOMY, LAPAROSCOPIC;  Surgeon: Grey Perez MD;  Location: [facility];  Service: General;  Laterality: N/A;    PLACEMENT OF DUAL-LUMEN VASCULAR CATHETER Left 2022    Procedure: INSERTION-CATHETER-JOSEPH;  Surgeon: Dionte Gan MD;  Location: [facility];  Service: General;  Laterality: Left;    PLACEMENT OF DUAL-LUMEN VASCULAR CATHETER Right 2022    Procedure:  INSERTION-CATHETER-Hemosplit;  Surgeon: Dionte Gan MD;  Location: [facility];  Service: General;  Laterality: Right;       Past Social History:  Social History     Socioeconomic History    Marital status:    Tobacco Use    Smoking status: Never    Smokeless tobacco: Never   Substance and Sexual Activity    Alcohol use: Not Currently    Drug use: No    Sexual activity: Not Currently     Partners: Male     Birth control/protection: I.U.D.     Social Determinants of Health     Financial Resource Strain: Unknown    Difficulty of Paying Living Expenses: Patient refused   Food Insecurity: Unknown    Worried About Running Out of Food in the Last Year: Patient refused    Ran Out of Food in the Last Year: Patient refused   Transportation Needs: Unmet Transportation Needs    Lack of Transportation (Medical): Yes    Lack of Transportation (Non-Medical): Yes   Physical Activity: Inactive    Days of Exercise per Week: 0 days    Minutes of Exercise per Session: 0 min   Stress: Unknown    Feeling of Stress : Patient refused   Social Connections: Unknown    Frequency of Communication with Friends and Family: More than three times a week    Frequency of Social Gatherings with Friends and Family: More than three times a week    Attends Latter day Services: Patient refused    Active Member of Clubs or Organizations: Patient refused    Attends Club or Organization Meetings: Patient refused    Marital Status: Patient refused   Housing Stability: High Risk    Unable to Pay for Housing in the Last Year: Patient refused    Unstable Housing in the Last Year: Yes       Medications:  No current facility-administered medications on file prior to encounter.     Current Outpatient Medications on File Prior to Encounter   Medication Sig Dispense Refill    albuterol (PROVENTIL/VENTOLIN HFA) 90 mcg/actuation inhaler Inhale 2 puffs into the lungs every 6 (six) hours as needed for Wheezing. Rescue 18 g 3    amLODIPine (NORVASC) 10 MG tablet  Take 1 tablet (10 mg total) by mouth once daily. 30 tablet 11    carvediloL (COREG) 25 MG tablet Take 1 tablet (25 mg total) by mouth 2 (two) times daily. 60 tablet 2    cloNIDine 0.2 mg/24 hr td ptwk (CATAPRES) 0.2 mg/24 hr Place 1 patch onto the skin every 7 days. 4 patch 2    ferrous sulfate 325 (65 FE) MG EC tablet Take 325 mg by mouth once daily.      fluconazole (DIFLUCAN) 150 MG Tab Take 150 mg by mouth once daily.      FLUoxetine 20 MG capsule Take 1 capsule (20 mg total) by mouth once daily. 30 capsule 11    furosemide (LASIX) 40 MG tablet Take 40 mg by mouth 2 (two) times a day.      gabapentin (NEURONTIN) 100 MG capsule Take 1 capsule (100 mg total) by mouth 2 (two) times daily. 90 capsule 2    hydrALAZINE (APRESOLINE) 100 MG tablet Take 1 tablet (100 mg total) by mouth every 8 (eight) hours. 90 tablet 2    HYDROmorphone (DILAUDID) 4 MG tablet Take 1 tablet (4 mg total) by mouth every 12 (twelve) hours as needed for Pain. 10 tablet 0    insulin aspart U-100 (NOVOLOG) 100 unit/mL (3 mL) InPn pen Inject 1-10 Units into the skin before meals and at bedtime as needed (Hyperglycemia). Max daily dose 40 units. 3 mL 6    insulin detemir U-100 (LEVEMIR FLEXTOUCH) 100 unit/mL (3 mL) SubQ InPn pen Inject 9 Units into the skin once daily. 6 mL 2    insulin glargine 100 units/mL SubQ pen Inject 28 Units into the skin once daily.      insulin lispro 100 unit/mL pen Inject 8 Units into the skin 3 (three) times daily with meals.      isosorbide mononitrate (IMDUR) 60 MG 24 hr tablet Take 2 tablets (120 mg total) by mouth once daily. 30 tablet 11    levETIRAcetam (KEPPRA) 500 MG Tab Take 1 tablet (500 mg total) by mouth 2 (two) times daily. 60 tablet 11    losartan-hydrochlorothiazide 100-12.5 mg (HYZAAR) 100-12.5 mg Tab Take 1 tablet by mouth once daily.      mirtazapine (REMERON SOL-TAB) 15 MG disintegrating tablet Take 1 tablet (15 mg total) by mouth nightly. 90 tablet 0    ondansetron (ZOFRAN) 4 MG tablet Take 1  "tablet (4 mg total) by mouth every 8 (eight) hours as needed for Nausea. 60 tablet 2    pantoprazole (PROTONIX) 40 MG tablet Take 40 mg by mouth once daily.      pen needle, diabetic (BD ULTRA-FINE MAGALIE PEN NEEDLE) 32 gauge x 5/32" Ndle Inject into the skin 5 times per day with insulin 100 each 12    [DISCONTINUED] atenoloL (TENORMIN) 50 MG tablet Take 1 tablet (50 mg total) by mouth every other day. 45 tablet 3    [DISCONTINUED] omeprazole (PRILOSEC) 20 MG capsule Take 2 capsules (40 mg total) by mouth once daily. for 10 days 20 capsule 0    [DISCONTINUED] torsemide (DEMADEX) 20 MG Tab Take 1 tablet (20 mg total) by mouth 2 (two) times a day. (Patient taking differently: Take 20 mg by mouth once daily.) 60 tablet 1     Scheduled Meds:   amLODIPine  10 mg Oral Daily    carvediloL  25 mg Oral BID    cloNIDine 0.3 mg/24 hr td ptwk  1 patch Transdermal Q7 Days    ferrous sulfate  1 tablet Oral Daily    FLUoxetine  20 mg Oral Daily    furosemide  40 mg Oral BID    hydrALAZINE  100 mg Oral Q8H    losartan  100 mg Oral Daily    And    hydroCHLOROthiazide  12.5 mg Oral Daily    insulin detemir U-100  28 Units Subcutaneous Daily    isosorbide mononitrate  120 mg Oral Daily    levETIRAcetam  500 mg Oral BID    mirtazapine  15 mg Oral QHS    mupirocin   Nasal BID    pantoprazole  40 mg Oral Before breakfast     Continuous Infusions:  PRN Meds:.sodium chloride, albuterol sulfate, cloNIDine, dextrose 10%, dextrose 10%, glucagon (human recombinant), glucagon (human recombinant), glucose, glucose, insulin aspart U-100, ondansetron    Allergies:  Penicillins    Past Family History:  Reviewed; refer to Hospitalist Admission Note    Review of Systems:  Review of Systems - All 14 systems reviewed and negative, except as noted in HPI    Physical Exam:    BP (!) 177/113   Pulse 86   Temp 98.4 °F (36.9 °C)   Resp 14   Ht 5' 2" (1.575 m)   Wt 59.9 kg (132 lb)   LMP  (LMP Unknown) Comment: Does not menstrate, NO PERIOD SINCE 12/2021 "  SpO2 100%   BMI 24.14 kg/m²     General Appearance:    Alert, cooperative, no distress, appears stated age   Head:    Normocephalic, without obvious abnormality, atraumatic   Eyes:    PER, conjunctiva/corneas clear, EOM's intact in both eyes        Throat:   Lips, mucosa, and tongue normal; teeth and gums normal   Back:     Symmetric, no curvature, ROM normal, no CVA tenderness   Lungs:     Clear to auscultation bilaterally, respirations unlabored   Chest wall:    No tenderness or deformity   Heart:    Regular rate and rhythm, S1 and S2 normal, no murmur, rub   or gallop   Abdomen:     tender   Extremities:   Extremities normal, atraumatic, no cyanosis or edema   Pulses:   2+ and symmetric all extremities   MSK:   No joint or muscle swelling, tenderness or deformity   Skin:   Skin color, texture, turgor normal, no rashes or lesions   Neurologic:   CNII-XII intact, normal strength and sensation       Throughout.  No flap     Results:  Lab Results   Component Value Date     (L) 01/23/2023    K 4.1 01/23/2023    CL 89 (L) 01/23/2023    CO2 25 01/23/2023    BUN 39 (H) 01/23/2023    CREATININE 4.5 (H) 01/23/2023    CALCIUM 8.6 (L) 01/23/2023    ANIONGAP 14 01/23/2023    ESTGFRAFRICA 20 (A) 07/31/2022    EGFRNONAA 18 (A) 07/31/2022       Lab Results   Component Value Date    CALCIUM 8.6 (L) 01/23/2023    PHOS 3.2 12/14/2022       Recent Labs   Lab 01/23/23 1942   WBC 6.15   RBC 2.70*   HGB 7.5*   HCT 21.1*   PLT 57*   MCV 78*   MCH 27.8   MCHC 35.5          I have personally reviewed pertinent radiological imaging and reports.    Patient care was time spent personally by me on the following activities:   Obtaining a history  Examination of patient.  Providing medical care at the patients bedside.  Developing a treatment plan with patient or surrogate and bedside caregivers  Ordering and reviewing laboratory studies, radiographic studies, pulse oximetry.  Ordering and performing treatments and  interventions.  Evaluation of patient's response to treatment.  Discussions with consultants while on the unit and immediately available to the patient.  Re-evaluation of the patient's condition.  Documentation in the medical record.     Hugh Figueroa MD  Nephrology  [facility]  [phone]

## 2023-01-24 NOTE — PROGRESS NOTES
HD tx tolerated well  Net UF 3 liters  HD catheter dressing changed       01/24/23 9763   Handoff Report   Received From Stephanie Mena   Given To Yvonne   Vital Signs   Temp 99.2 °F (37.3 °C)   Temp src Oral   Pulse 81   Resp 16   SpO2 98 %   Device (Oxygen Therapy) room air   BP (!) 149/91   Assessments (Pre/Post)   Blood Liters Processed (BLP) 60   Transport Modality bed   Level of Consciousness (AVPU) alert   Dialyzer Clearance moderately streaked   Post-Hemodialysis Assessment   Rinseback Volume (mL) 250 mL   Blood Volume Processed (Liters) 60 L   Dialyzer Clearance Moderately streaked   Duration of Treatment 180 minutes   Additional Fluid Intake (mL) 0 mL   Total UF (mL) 3500 mL   Net Fluid Removal 3000   Patient Response to Treatment Tolerated well   Post-Treatment Weight 56.9 kg (125 lb 7.1 oz)   Treatment Weight Change -3   Post-Hemodialysis Comments Tx complete, no issues

## 2023-01-24 NOTE — HPI
Patient is 34 y/o AA female that presents to the ER for evaluation of abdominal pain. She has complex history of HTN, ESRD with diabetes, gastroparesis, and  anemia. She has had multiple admissions for abdominal pain. She reports that symptoms started a few days ago with worsening. She reports being med compliant. She denies any recent sick contacts. She denies any recent injury or trauma. Labs  reviewed and  H/H decreased and with associated symptoms transfusion started in ER. She denies  any fever or chills.

## 2023-01-25 ENCOUNTER — HOSPITAL ENCOUNTER (OUTPATIENT)
Facility: HOSPITAL | Age: 33
Discharge: HOME | End: 2023-01-27
Attending: EMERGENCY MEDICINE | Admitting: STUDENT IN AN ORGANIZED HEALTH CARE EDUCATION/TRAINING PROGRAM
Payer: MEDICAID

## 2023-01-25 VITALS
RESPIRATION RATE: 20 BRPM | SYSTOLIC BLOOD PRESSURE: 191 MMHG | OXYGEN SATURATION: 98 % | DIASTOLIC BLOOD PRESSURE: 128 MMHG | WEIGHT: 97.44 LBS | HEIGHT: 62 IN | HEART RATE: 95 BPM | BODY MASS INDEX: 17.93 KG/M2 | TEMPERATURE: 97 F

## 2023-01-25 DIAGNOSIS — R07.9 CHEST PAIN: ICD-10-CM

## 2023-01-25 DIAGNOSIS — E16.2 HYPOGLYCEMIA: Primary | ICD-10-CM

## 2023-01-25 DIAGNOSIS — R10.9 ABDOMINAL PAIN: ICD-10-CM

## 2023-01-25 LAB
ALBUMIN SERPL BCP-MCNC: 2.9 G/DL (ref 3.5–5.2)
ALBUMIN SERPL BCP-MCNC: 3.2 G/DL (ref 3.5–5.2)
ALP SERPL-CCNC: 124 U/L (ref 55–135)
ALP SERPL-CCNC: 160 U/L (ref 55–135)
ALT SERPL W/O P-5'-P-CCNC: 10 U/L (ref 10–44)
ALT SERPL W/O P-5'-P-CCNC: 10 U/L (ref 10–44)
ANION GAP SERPL CALC-SCNC: 11 MMOL/L (ref 8–16)
ANION GAP SERPL CALC-SCNC: 12 MMOL/L (ref 8–16)
AST SERPL-CCNC: 18 U/L (ref 10–40)
AST SERPL-CCNC: 18 U/L (ref 10–40)
BASOPHILS # BLD AUTO: 0.02 K/UL (ref 0–0.2)
BASOPHILS NFR BLD: 0.2 % (ref 0–1.9)
BILIRUB SERPL-MCNC: 1.3 MG/DL (ref 0.1–1)
BILIRUB SERPL-MCNC: 1.4 MG/DL (ref 0.1–1)
BUN SERPL-MCNC: 30 MG/DL (ref 6–20)
BUN SERPL-MCNC: 33 MG/DL (ref 6–20)
CALCIUM SERPL-MCNC: 8.2 MG/DL (ref 8.7–10.5)
CALCIUM SERPL-MCNC: 9.3 MG/DL (ref 8.7–10.5)
CHLORIDE SERPL-SCNC: 96 MMOL/L (ref 95–110)
CHLORIDE SERPL-SCNC: 99 MMOL/L (ref 95–110)
CO2 SERPL-SCNC: 24 MMOL/L (ref 23–29)
CO2 SERPL-SCNC: 26 MMOL/L (ref 23–29)
CREAT SERPL-MCNC: 3.7 MG/DL (ref 0.5–1.4)
CREAT SERPL-MCNC: 4.4 MG/DL (ref 0.5–1.4)
DIFFERENTIAL METHOD: ABNORMAL
EOSINOPHIL # BLD AUTO: 0 K/UL (ref 0–0.5)
EOSINOPHIL NFR BLD: 0.2 % (ref 0–8)
ERYTHROCYTE [DISTWIDTH] IN BLOOD BY AUTOMATED COUNT: 15.7 % (ref 11.5–14.5)
ERYTHROCYTE [DISTWIDTH] IN BLOOD BY AUTOMATED COUNT: 15.9 % (ref 11.5–14.5)
EST. GFR  (NO RACE VARIABLE): 13 ML/MIN/1.73 M^2
EST. GFR  (NO RACE VARIABLE): 15.8 ML/MIN/1.73 M^2
GLUCOSE SERPL-MCNC: 123 MG/DL (ref 70–110)
GLUCOSE SERPL-MCNC: 216 MG/DL (ref 70–110)
GLUCOSE SERPL-MCNC: 229 MG/DL (ref 70–110)
HCT VFR BLD AUTO: 27.1 % (ref 37–48.5)
HCT VFR BLD AUTO: 32 % (ref 37–48.5)
HGB BLD-MCNC: 11.3 G/DL (ref 12–16)
HGB BLD-MCNC: 9.7 G/DL (ref 12–16)
IMM GRANULOCYTES # BLD AUTO: 0.08 K/UL (ref 0–0.04)
IMM GRANULOCYTES NFR BLD AUTO: 0.9 % (ref 0–0.5)
LIPASE SERPL-CCNC: 12 U/L (ref 4–60)
LYMPHOCYTES # BLD AUTO: 0.5 K/UL (ref 1–4.8)
LYMPHOCYTES NFR BLD: 6 % (ref 18–48)
MCH RBC QN AUTO: 28.5 PG (ref 27–31)
MCH RBC QN AUTO: 29 PG (ref 27–31)
MCHC RBC AUTO-ENTMCNC: 35.3 G/DL (ref 32–36)
MCHC RBC AUTO-ENTMCNC: 35.8 G/DL (ref 32–36)
MCV RBC AUTO: 81 FL (ref 82–98)
MCV RBC AUTO: 81 FL (ref 82–98)
MONOCYTES # BLD AUTO: 0.2 K/UL (ref 0.3–1)
MONOCYTES NFR BLD: 2.6 % (ref 4–15)
NEUTROPHILS # BLD AUTO: 7.9 K/UL (ref 1.8–7.7)
NEUTROPHILS NFR BLD: 90.1 % (ref 38–73)
NRBC BLD-RTO: 0 /100 WBC
PLATELET # BLD AUTO: 56 K/UL (ref 150–450)
PLATELET # BLD AUTO: 58 K/UL (ref 150–450)
PMV BLD AUTO: 9.6 FL (ref 9.2–12.9)
PMV BLD AUTO: 9.6 FL (ref 9.2–12.9)
POCT GLUCOSE: 101 MG/DL (ref 70–110)
POCT GLUCOSE: 42 MG/DL (ref 70–110)
POCT GLUCOSE: 48 MG/DL (ref 70–110)
POCT GLUCOSE: 48 MG/DL (ref 70–110)
POCT GLUCOSE: 50 MG/DL (ref 70–110)
POCT GLUCOSE: 70 MG/DL (ref 70–110)
POCT GLUCOSE: 75 MG/DL (ref 70–110)
POCT GLUCOSE: 88 MG/DL (ref 70–110)
POCT GLUCOSE: 91 MG/DL (ref 70–110)
POTASSIUM SERPL-SCNC: 3.6 MMOL/L (ref 3.5–5.1)
POTASSIUM SERPL-SCNC: 4.4 MMOL/L (ref 3.5–5.1)
PROT SERPL-MCNC: 5.8 G/DL (ref 6–8.4)
PROT SERPL-MCNC: 7 G/DL (ref 6–8.4)
RBC # BLD AUTO: 3.35 M/UL (ref 4–5.4)
RBC # BLD AUTO: 3.96 M/UL (ref 4–5.4)
SODIUM SERPL-SCNC: 131 MMOL/L (ref 136–145)
SODIUM SERPL-SCNC: 137 MMOL/L (ref 136–145)
TROPONIN I SERPL DL<=0.01 NG/ML-MCNC: 0.04 NG/ML (ref 0–0.03)
TROPONIN I SERPL DL<=0.01 NG/ML-MCNC: 0.07 NG/ML (ref 0–0.03)
WBC # BLD AUTO: 6.72 K/UL (ref 3.9–12.7)
WBC # BLD AUTO: 8.82 K/UL (ref 3.9–12.7)

## 2023-01-25 PROCEDURE — 25000003 PHARM REV CODE 250: Performed by: EMERGENCY MEDICINE

## 2023-01-25 PROCEDURE — 36415 COLL VENOUS BLD VENIPUNCTURE: CPT | Performed by: NURSE PRACTITIONER

## 2023-01-25 PROCEDURE — 25000003 PHARM REV CODE 250: Performed by: NURSE PRACTITIONER

## 2023-01-25 PROCEDURE — 83690 ASSAY OF LIPASE: CPT | Performed by: NURSE PRACTITIONER

## 2023-01-25 PROCEDURE — 85027 COMPLETE CBC AUTOMATED: CPT | Performed by: INTERNAL MEDICINE

## 2023-01-25 PROCEDURE — G0378 HOSPITAL OBSERVATION PER HR: HCPCS

## 2023-01-25 PROCEDURE — 63600175 PHARM REV CODE 636 W HCPCS: Mod: TB,JG | Performed by: STUDENT IN AN ORGANIZED HEALTH CARE EDUCATION/TRAINING PROGRAM

## 2023-01-25 PROCEDURE — 36415 COLL VENOUS BLD VENIPUNCTURE: CPT | Performed by: INTERNAL MEDICINE

## 2023-01-25 PROCEDURE — 93010 ELECTROCARDIOGRAM REPORT: CPT | Mod: ,,, | Performed by: INTERNAL MEDICINE

## 2023-01-25 PROCEDURE — 93010 EKG 12-LEAD: ICD-10-PCS | Mod: ,,, | Performed by: INTERNAL MEDICINE

## 2023-01-25 PROCEDURE — 84484 ASSAY OF TROPONIN QUANT: CPT | Performed by: NURSE PRACTITIONER

## 2023-01-25 PROCEDURE — 63600175 PHARM REV CODE 636 W HCPCS: Performed by: INTERNAL MEDICINE

## 2023-01-25 PROCEDURE — 80053 COMPREHEN METABOLIC PANEL: CPT | Performed by: INTERNAL MEDICINE

## 2023-01-25 PROCEDURE — 63600175 PHARM REV CODE 636 W HCPCS: Performed by: NURSE PRACTITIONER

## 2023-01-25 PROCEDURE — 25000003 PHARM REV CODE 250: Performed by: STUDENT IN AN ORGANIZED HEALTH CARE EDUCATION/TRAINING PROGRAM

## 2023-01-25 PROCEDURE — 96366 THER/PROPH/DIAG IV INF ADDON: CPT

## 2023-01-25 PROCEDURE — 25000003 PHARM REV CODE 250: Performed by: INTERNAL MEDICINE

## 2023-01-25 PROCEDURE — 96365 THER/PROPH/DIAG IV INF INIT: CPT

## 2023-01-25 PROCEDURE — 82962 GLUCOSE BLOOD TEST: CPT

## 2023-01-25 PROCEDURE — 93005 ELECTROCARDIOGRAM TRACING: CPT

## 2023-01-25 PROCEDURE — 96375 TX/PRO/DX INJ NEW DRUG ADDON: CPT

## 2023-01-25 PROCEDURE — 96376 TX/PRO/DX INJ SAME DRUG ADON: CPT

## 2023-01-25 PROCEDURE — 85025 COMPLETE CBC W/AUTO DIFF WBC: CPT | Performed by: NURSE PRACTITIONER

## 2023-01-25 PROCEDURE — 99291 CRITICAL CARE FIRST HOUR: CPT | Mod: 25

## 2023-01-25 PROCEDURE — 84484 ASSAY OF TROPONIN QUANT: CPT | Mod: 91 | Performed by: NURSE PRACTITIONER

## 2023-01-25 PROCEDURE — 80053 COMPREHEN METABOLIC PANEL: CPT | Mod: 91 | Performed by: NURSE PRACTITIONER

## 2023-01-25 RX ORDER — INSULIN ASPART 100 [IU]/ML
1-10 INJECTION, SOLUTION INTRAVENOUS; SUBCUTANEOUS
Status: DISCONTINUED | OUTPATIENT
Start: 2023-01-25 | End: 2023-01-27 | Stop reason: HOSPADM

## 2023-01-25 RX ORDER — SODIUM,POTASSIUM PHOSPHATES 280-250MG
2 POWDER IN PACKET (EA) ORAL
Status: DISCONTINUED | OUTPATIENT
Start: 2023-01-25 | End: 2023-01-26

## 2023-01-25 RX ORDER — FUROSEMIDE 40 MG/1
40 TABLET ORAL 2 TIMES DAILY
Status: DISCONTINUED | OUTPATIENT
Start: 2023-01-25 | End: 2023-01-27 | Stop reason: HOSPADM

## 2023-01-25 RX ORDER — FLUOXETINE HYDROCHLORIDE 20 MG/1
20 CAPSULE ORAL DAILY
Status: DISCONTINUED | OUTPATIENT
Start: 2023-01-26 | End: 2023-01-27 | Stop reason: HOSPADM

## 2023-01-25 RX ORDER — LANOLIN ALCOHOL/MO/W.PET/CERES
800 CREAM (GRAM) TOPICAL
Status: DISCONTINUED | OUTPATIENT
Start: 2023-01-25 | End: 2023-01-26

## 2023-01-25 RX ORDER — NALOXONE HCL 0.4 MG/ML
0.02 VIAL (ML) INJECTION
Status: DISCONTINUED | OUTPATIENT
Start: 2023-01-25 | End: 2023-01-26

## 2023-01-25 RX ORDER — ACETAMINOPHEN 10 MG/ML
1000 INJECTION, SOLUTION INTRAVENOUS ONCE
Status: COMPLETED | OUTPATIENT
Start: 2023-01-25 | End: 2023-01-25

## 2023-01-25 RX ORDER — LEVETIRACETAM 500 MG/1
500 TABLET ORAL 2 TIMES DAILY
Status: DISCONTINUED | OUTPATIENT
Start: 2023-01-25 | End: 2023-01-27 | Stop reason: HOSPADM

## 2023-01-25 RX ORDER — SIMETHICONE 80 MG
1 TABLET,CHEWABLE ORAL 4 TIMES DAILY PRN
Status: DISCONTINUED | OUTPATIENT
Start: 2023-01-25 | End: 2023-01-26

## 2023-01-25 RX ORDER — IBUPROFEN 200 MG
16 TABLET ORAL
Status: DISCONTINUED | OUTPATIENT
Start: 2023-01-25 | End: 2023-01-27 | Stop reason: HOSPADM

## 2023-01-25 RX ORDER — PANTOPRAZOLE SODIUM 40 MG/1
40 TABLET, DELAYED RELEASE ORAL DAILY
Status: DISCONTINUED | OUTPATIENT
Start: 2023-01-26 | End: 2023-01-27 | Stop reason: HOSPADM

## 2023-01-25 RX ORDER — MIRTAZAPINE 15 MG/1
15 TABLET, FILM COATED ORAL NIGHTLY
Status: DISCONTINUED | OUTPATIENT
Start: 2023-01-25 | End: 2023-01-27 | Stop reason: HOSPADM

## 2023-01-25 RX ORDER — DEXTROSE 50 % IN WATER (D50W) INTRAVENOUS SYRINGE
25
Status: COMPLETED | OUTPATIENT
Start: 2023-01-25 | End: 2023-01-25

## 2023-01-25 RX ORDER — LIDOCAINE HYDROCHLORIDE 20 MG/ML
15 SOLUTION OROPHARYNGEAL ONCE
Status: COMPLETED | OUTPATIENT
Start: 2023-01-25 | End: 2023-01-25

## 2023-01-25 RX ORDER — INSULIN GLARGINE 100 [IU]/ML
16 INJECTION, SOLUTION SUBCUTANEOUS NIGHTLY
COMMUNITY
End: 2023-03-10

## 2023-01-25 RX ORDER — ACETAMINOPHEN 325 MG/1
650 TABLET ORAL EVERY 4 HOURS PRN
Status: DISCONTINUED | OUTPATIENT
Start: 2023-01-25 | End: 2023-01-27 | Stop reason: HOSPADM

## 2023-01-25 RX ORDER — SODIUM CHLORIDE 0.9 % (FLUSH) 0.9 %
3 SYRINGE (ML) INJECTION EVERY 12 HOURS PRN
Status: DISCONTINUED | OUTPATIENT
Start: 2023-01-25 | End: 2023-01-27 | Stop reason: HOSPADM

## 2023-01-25 RX ORDER — LOSARTAN POTASSIUM 100 MG/1
100 TABLET ORAL DAILY
Status: DISCONTINUED | OUTPATIENT
Start: 2023-01-26 | End: 2023-01-27 | Stop reason: HOSPADM

## 2023-01-25 RX ORDER — MAG HYDROX/ALUMINUM HYD/SIMETH 200-200-20
30 SUSPENSION, ORAL (FINAL DOSE FORM) ORAL 4 TIMES DAILY PRN
Status: DISCONTINUED | OUTPATIENT
Start: 2023-01-25 | End: 2023-01-26

## 2023-01-25 RX ORDER — DEXTROSE 50 % IN WATER (D50W) INTRAVENOUS SYRINGE
25
Status: DISCONTINUED | OUTPATIENT
Start: 2023-01-25 | End: 2023-01-25

## 2023-01-25 RX ORDER — ERYTHROMYCIN ETHYLSUCCINATE 200 MG/5ML
100 SUSPENSION ORAL ONCE
Status: DISCONTINUED | OUTPATIENT
Start: 2023-01-25 | End: 2023-01-26

## 2023-01-25 RX ORDER — ISOSORBIDE MONONITRATE 60 MG/1
120 TABLET, EXTENDED RELEASE ORAL DAILY
Status: DISCONTINUED | OUTPATIENT
Start: 2023-01-26 | End: 2023-01-27 | Stop reason: HOSPADM

## 2023-01-25 RX ORDER — IBUPROFEN 200 MG
24 TABLET ORAL
Status: DISCONTINUED | OUTPATIENT
Start: 2023-01-25 | End: 2023-01-27 | Stop reason: HOSPADM

## 2023-01-25 RX ORDER — HYOSCYAMINE SULFATE 0.5 MG/ML
0.5 INJECTION, SOLUTION SUBCUTANEOUS
Status: COMPLETED | OUTPATIENT
Start: 2023-01-25 | End: 2023-01-25

## 2023-01-25 RX ORDER — GABAPENTIN 100 MG/1
100 CAPSULE ORAL 2 TIMES DAILY
Status: DISCONTINUED | OUTPATIENT
Start: 2023-01-25 | End: 2023-01-27 | Stop reason: HOSPADM

## 2023-01-25 RX ORDER — ACETAMINOPHEN 325 MG/1
650 TABLET ORAL EVERY 8 HOURS PRN
Status: DISCONTINUED | OUTPATIENT
Start: 2023-01-25 | End: 2023-01-25 | Stop reason: SDUPTHER

## 2023-01-25 RX ORDER — HYDRALAZINE HYDROCHLORIDE 25 MG/1
100 TABLET, FILM COATED ORAL EVERY 8 HOURS
Status: DISCONTINUED | OUTPATIENT
Start: 2023-01-25 | End: 2023-01-27 | Stop reason: HOSPADM

## 2023-01-25 RX ORDER — ONDANSETRON 2 MG/ML
8 INJECTION INTRAMUSCULAR; INTRAVENOUS EVERY 6 HOURS PRN
Status: DISCONTINUED | OUTPATIENT
Start: 2023-01-25 | End: 2023-01-27 | Stop reason: HOSPADM

## 2023-01-25 RX ORDER — GLUCAGON 1 MG
1 KIT INJECTION
Status: DISCONTINUED | OUTPATIENT
Start: 2023-01-25 | End: 2023-01-27 | Stop reason: HOSPADM

## 2023-01-25 RX ORDER — HYOSCYAMINE SULFATE 0.5 MG/ML
0.25 INJECTION, SOLUTION SUBCUTANEOUS ONCE
Status: COMPLETED | OUTPATIENT
Start: 2023-01-25 | End: 2023-01-25

## 2023-01-25 RX ORDER — ACETAMINOPHEN 500 MG
1000 TABLET ORAL
Status: COMPLETED | OUTPATIENT
Start: 2023-01-25 | End: 2023-01-25

## 2023-01-25 RX ORDER — DEXTROSE MONOHYDRATE 100 MG/ML
INJECTION, SOLUTION INTRAVENOUS CONTINUOUS
Status: DISCONTINUED | OUTPATIENT
Start: 2023-01-25 | End: 2023-01-26

## 2023-01-25 RX ORDER — PROCHLORPERAZINE EDISYLATE 5 MG/ML
5 INJECTION INTRAMUSCULAR; INTRAVENOUS EVERY 6 HOURS PRN
Status: DISCONTINUED | OUTPATIENT
Start: 2023-01-25 | End: 2023-01-27 | Stop reason: HOSPADM

## 2023-01-25 RX ORDER — PROMETHAZINE HYDROCHLORIDE 25 MG/1
25 SUPPOSITORY RECTAL EVERY 6 HOURS PRN
Status: DISCONTINUED | OUTPATIENT
Start: 2023-01-25 | End: 2023-01-27 | Stop reason: HOSPADM

## 2023-01-25 RX ORDER — AMOXICILLIN 250 MG
1 CAPSULE ORAL 2 TIMES DAILY
Status: DISCONTINUED | OUTPATIENT
Start: 2023-01-25 | End: 2023-01-27 | Stop reason: HOSPADM

## 2023-01-25 RX ORDER — ONDANSETRON 2 MG/ML
4 INJECTION INTRAMUSCULAR; INTRAVENOUS
Status: COMPLETED | OUTPATIENT
Start: 2023-01-25 | End: 2023-01-25

## 2023-01-25 RX ORDER — AMLODIPINE BESYLATE 5 MG/1
10 TABLET ORAL DAILY
Status: DISCONTINUED | OUTPATIENT
Start: 2023-01-26 | End: 2023-01-27 | Stop reason: HOSPADM

## 2023-01-25 RX ORDER — OXYCODONE HYDROCHLORIDE 5 MG/1
10 TABLET ORAL ONCE AS NEEDED
Status: COMPLETED | OUTPATIENT
Start: 2023-01-25 | End: 2023-01-25

## 2023-01-25 RX ORDER — TALC
9 POWDER (GRAM) TOPICAL NIGHTLY PRN
Status: DISCONTINUED | OUTPATIENT
Start: 2023-01-25 | End: 2023-01-26

## 2023-01-25 RX ORDER — MAG HYDROX/ALUMINUM HYD/SIMETH 200-200-20
30 SUSPENSION, ORAL (FINAL DOSE FORM) ORAL ONCE
Status: DISCONTINUED | OUTPATIENT
Start: 2023-01-25 | End: 2023-01-27 | Stop reason: HOSPADM

## 2023-01-25 RX ORDER — HYDROCHLOROTHIAZIDE 12.5 MG/1
12.5 TABLET ORAL DAILY
Status: DISCONTINUED | OUTPATIENT
Start: 2023-01-26 | End: 2023-01-27 | Stop reason: HOSPADM

## 2023-01-25 RX ORDER — CARVEDILOL 25 MG/1
25 TABLET ORAL 2 TIMES DAILY
Status: DISCONTINUED | OUTPATIENT
Start: 2023-01-25 | End: 2023-01-27 | Stop reason: HOSPADM

## 2023-01-25 RX ORDER — HYDRALAZINE HYDROCHLORIDE 20 MG/ML
10 INJECTION INTRAMUSCULAR; INTRAVENOUS ONCE
Status: COMPLETED | OUTPATIENT
Start: 2023-01-25 | End: 2023-01-25

## 2023-01-25 RX ADMIN — GABAPENTIN 100 MG: 100 CAPSULE ORAL at 08:01

## 2023-01-25 RX ADMIN — ISOSORBIDE MONONITRATE 120 MG: 30 TABLET, EXTENDED RELEASE ORAL at 09:01

## 2023-01-25 RX ADMIN — DEXTROSE: 10 SOLUTION INTRAVENOUS at 09:01

## 2023-01-25 RX ADMIN — HYDRALAZINE HYDROCHLORIDE 10 MG: 20 INJECTION INTRAMUSCULAR; INTRAVENOUS at 10:01

## 2023-01-25 RX ADMIN — HYDRALAZINE HYDROCHLORIDE 100 MG: 25 TABLET ORAL at 05:01

## 2023-01-25 RX ADMIN — FUROSEMIDE 40 MG: 40 TABLET ORAL at 09:01

## 2023-01-25 RX ADMIN — LOSARTAN POTASSIUM 100 MG: 50 TABLET, FILM COATED ORAL at 09:01

## 2023-01-25 RX ADMIN — ACETAMINOPHEN 1000 MG: 500 TABLET ORAL at 03:01

## 2023-01-25 RX ADMIN — MIRTAZAPINE 15 MG: 15 TABLET, FILM COATED ORAL at 09:01

## 2023-01-25 RX ADMIN — CARVEDILOL 25 MG: 25 TABLET, FILM COATED ORAL at 09:01

## 2023-01-25 RX ADMIN — FERROUS SULFATE TAB 325 MG (65 MG ELEMENTAL FE) 1 EACH: 325 (65 FE) TAB at 09:01

## 2023-01-25 RX ADMIN — LEVETIRACETAM 500 MG: 500 TABLET, FILM COATED ORAL at 09:01

## 2023-01-25 RX ADMIN — AMLODIPINE BESYLATE 10 MG: 5 TABLET ORAL at 09:01

## 2023-01-25 RX ADMIN — LEVETIRACETAM 500 MG: 500 TABLET, FILM COATED ORAL at 08:01

## 2023-01-25 RX ADMIN — HYDROCHLOROTHIAZIDE 12.5 MG: 12.5 TABLET ORAL at 09:01

## 2023-01-25 RX ADMIN — FUROSEMIDE 40 MG: 40 TABLET ORAL at 08:01

## 2023-01-25 RX ADMIN — PROMETHAZINE HYDROCHLORIDE 12.5 MG: 25 INJECTION INTRAMUSCULAR; INTRAVENOUS at 07:01

## 2023-01-25 RX ADMIN — LACTULOSE 20 G: 20 SOLUTION ORAL at 09:01

## 2023-01-25 RX ADMIN — INSULIN DETEMIR 28 UNITS: 100 INJECTION, SOLUTION SUBCUTANEOUS at 09:01

## 2023-01-25 RX ADMIN — CARVEDILOL 25 MG: 25 TABLET, FILM COATED ORAL at 08:01

## 2023-01-25 RX ADMIN — HYOSCYAMINE SULFATE 0.5 MG: 0.5 INJECTION, SOLUTION SUBCUTANEOUS at 03:01

## 2023-01-25 RX ADMIN — LIDOCAINE HYDROCHLORIDE 15 ML: 20 SOLUTION ORAL at 08:01

## 2023-01-25 RX ADMIN — FLUOXETINE 20 MG: 20 CAPSULE ORAL at 09:01

## 2023-01-25 RX ADMIN — ONDANSETRON HYDROCHLORIDE 4 MG: 2 SOLUTION INTRAMUSCULAR; INTRAVENOUS at 05:01

## 2023-01-25 RX ADMIN — OXYCODONE HYDROCHLORIDE 10 MG: 5 TABLET ORAL at 05:01

## 2023-01-25 RX ADMIN — ACETAMINOPHEN 1000 MG: 10 INJECTION INTRAVENOUS at 01:01

## 2023-01-25 RX ADMIN — DEXTROSE MONOHYDRATE 25 G: 25 INJECTION, SOLUTION INTRAVENOUS at 06:01

## 2023-01-25 RX ADMIN — HYOSCYAMINE SULFATE 0.25 MG: 0.5 INJECTION, SOLUTION SUBCUTANEOUS at 09:01

## 2023-01-25 RX ADMIN — PANTOPRAZOLE SODIUM 40 MG: 40 TABLET, DELAYED RELEASE ORAL at 05:01

## 2023-01-25 RX ADMIN — ALUMINUM HYDROXIDE, MAGNESIUM HYDROXIDE, AND SIMETHICONE 30 ML: 200; 200; 20 SUSPENSION ORAL at 08:01

## 2023-01-25 RX ADMIN — DEXTROSE MONOHYDRATE 25 G: 25 INJECTION, SOLUTION INTRAVENOUS at 03:01

## 2023-01-25 NOTE — ED NOTES
Patient opened her eyes with the sound of my voice. Patient is no longer flailing in the bed. Lights are off for comfort. Patient has been given warm blankets.

## 2023-01-25 NOTE — PROGRESS NOTES
[facility] Medicine  Progress Note    Patient Name: Tabby Howard  MRN: [id number]  Patient Class: IP- Inpatient   Admission Date: 1/23/2023  Length of Stay: 1 days  Attending Physician: Alfonso Garsia MD  Primary Care Provider: [facility]        Subjective:     Principal Problem:Abdominal pain        HPI:  Patient is 34 y/o AA female that presents to the ER for evaluation of abdominal pain. She has complex history of HTN, ESRD with diabetes, gastroparesis, and  anemia. She has had multiple admissions for abdominal pain. She reports that symptoms started a few days ago with worsening. She reports being med compliant. She denies any recent sick contacts. She denies any recent injury or trauma. Labs  reviewed and  H/H decreased and with associated symptoms transfusion started in ER. She denies  any fever or chills.       Overview/Hospital Course:  1/24  Again got admitted with abdominal pain  Had HD today   Only thing she wants is dilaudid       Interval History:     Review of Systems   Constitutional:  Negative for activity change and appetite change.   HENT:  Negative for congestion and dental problem.    Eyes:  Negative for discharge and itching.   Respiratory:  Negative for shortness of breath.    Cardiovascular:  Negative for chest pain.   Gastrointestinal:  Positive for abdominal pain. Negative for abdominal distention.   Endocrine: Negative for cold intolerance.   Genitourinary:  Negative for difficulty urinating and dysuria.   Musculoskeletal:  Negative for arthralgias and back pain.   Skin:  Negative for color change.   Neurological:  Negative for dizziness and facial asymmetry.   Hematological:  Negative for adenopathy.   Psychiatric/Behavioral:  Negative for agitation and behavioral problems.    Objective:     Vital Signs (Most Recent):  Temp: 98.5 °F (36.9 °C) (01/24/23 1741)  Pulse: 87 (01/24/23 1741)  Resp: 20 (01/24/23 1741)  BP: (!) 159/112  (nurse notified) (01/24/23 1741)  SpO2: 99 % (01/24/23 1741)   Vital Signs (24h Range):  Temp:  [98 °F (36.7 °C)-99.2 °F (37.3 °C)] 98.5 °F (36.9 °C)  Pulse:  [76-90] 87  Resp:  [11-25] 20  SpO2:  [90 %-100 %] 99 %  BP: (102-186)/() 159/112     Weight: 44.2 kg (97 lb 7.1 oz)  Body mass index is 17.82 kg/m².    Intake/Output Summary (Last 24 hours) at 1/24/2023 1808  Last data filed at 1/24/2023 1746  Gross per 24 hour   Intake 979 ml   Output 3500 ml   Net -2521 ml      Physical Exam  Vitals and nursing note reviewed.   Constitutional:       General: She is not in acute distress.  HENT:      Head: Atraumatic.      Right Ear: External ear normal.      Left Ear: External ear normal.      Nose: Nose normal.      Mouth/Throat:      Mouth: Mucous membranes are moist.   Cardiovascular:      Rate and Rhythm: Normal rate.   Pulmonary:      Effort: Pulmonary effort is normal.   Musculoskeletal:         General: Normal range of motion.      Cervical back: Normal range of motion.   Skin:     General: Skin is warm.   Neurological:      Mental Status: She is alert and oriented to person, place, and time.   Psychiatric:         Behavior: Behavior normal.       Significant Labs: All pertinent labs within the past 24 hours have been reviewed.  CBC:   Recent Labs   Lab 01/23/23 1942 01/24/23  1209   WBC 6.15 7.96   HGB 7.5* 10.3*   HCT 21.1* 29.2*   PLT 57* 50*     CMP:   Recent Labs   Lab 01/23/23 1942 01/24/23  1209   * 128*   K 4.1 4.0   CL 89* 91*   CO2 25 25   * 364*   BUN 39* 47*   CREATININE 4.5* 5.2*   CALCIUM 8.6* 8.3*   PROT 6.6 5.9*   ALBUMIN 3.1* 2.7*   BILITOT 2.0* 1.7*   ALKPHOS 131 119   AST 17 15   ALT 13 12   ANIONGAP 14 12       Significant Imaging: I have reviewed all pertinent imaging results/findings within the past 24 hours.      Assessment/Plan:      * Abdominal pain  This is a chronic issue and abdominal pain mysteriously disappear always always with dilaudid only  Was here few days ago  and went home with dilaudid  High chance for gastroparesis but unable to give reglan because pt suffers from seizure     Pericardial effusion  Pericardial effusion is a chronic issue  No signs of tamponade       Anemia of chronic kidney failure, stage 5  Anemia of ESRD  S/p pRBC     Drug-seeking behavior  Ongoing issue       Hypertension  Maintain present regime      ESRD (end stage renal disease) on dialysis  HD as scheduled     Seizure disorder  Maintain keppra       Gastroparesis - suspected, unconfirmed  As above         Type 2 diabetes mellitus with chronic kidney disease on chronic dialysis, with long-term current use of insulin  Maintain SSI       VTE Risk Mitigation (From admission, onward)    None          Discharge Planning   TATIANA: 1/24/2023     Code Status: Prior   Is the patient medically ready for discharge?:     Reason for patient still in hospital (select all that apply): Treatment  Discharge Plan A: Home with family                  Alfonso Garsia MD  Department of Hospital Medicine   [facility]

## 2023-01-25 NOTE — SUBJECTIVE & OBJECTIVE
Interval History:     Review of Systems   Constitutional:  Negative for activity change and appetite change.   HENT:  Negative for congestion and dental problem.    Eyes:  Negative for discharge and itching.   Respiratory:  Negative for shortness of breath.    Cardiovascular:  Negative for chest pain.   Gastrointestinal:  Positive for abdominal pain. Negative for abdominal distention.   Endocrine: Negative for cold intolerance.   Genitourinary:  Negative for difficulty urinating and dysuria.   Musculoskeletal:  Negative for arthralgias and back pain.   Skin:  Negative for color change.   Neurological:  Negative for dizziness and facial asymmetry.   Hematological:  Negative for adenopathy.   Psychiatric/Behavioral:  Negative for agitation and behavioral problems.    Objective:     Vital Signs (Most Recent):  Temp: 98.5 °F (36.9 °C) (01/24/23 1741)  Pulse: 87 (01/24/23 1741)  Resp: 20 (01/24/23 1741)  BP: (!) 159/112 (nurse notified) (01/24/23 1741)  SpO2: 99 % (01/24/23 1741)   Vital Signs (24h Range):  Temp:  [98 °F (36.7 °C)-99.2 °F (37.3 °C)] 98.5 °F (36.9 °C)  Pulse:  [76-90] 87  Resp:  [11-25] 20  SpO2:  [90 %-100 %] 99 %  BP: (102-186)/() 159/112     Weight: 44.2 kg (97 lb 7.1 oz)  Body mass index is 17.82 kg/m².    Intake/Output Summary (Last 24 hours) at 1/24/2023 1808  Last data filed at 1/24/2023 1746  Gross per 24 hour   Intake 979 ml   Output 3500 ml   Net -2521 ml      Physical Exam  Vitals and nursing note reviewed.   Constitutional:       General: She is not in acute distress.  HENT:      Head: Atraumatic.      Right Ear: External ear normal.      Left Ear: External ear normal.      Nose: Nose normal.      Mouth/Throat:      Mouth: Mucous membranes are moist.   Cardiovascular:      Rate and Rhythm: Normal rate.   Pulmonary:      Effort: Pulmonary effort is normal.   Musculoskeletal:         General: Normal range of motion.      Cervical back: Normal range of motion.   Skin:     General: Skin is  warm.   Neurological:      Mental Status: She is alert and oriented to person, place, and time.   Psychiatric:         Behavior: Behavior normal.       Significant Labs: All pertinent labs within the past 24 hours have been reviewed.  CBC:   Recent Labs   Lab 01/23/23 1942 01/24/23  1209   WBC 6.15 7.96   HGB 7.5* 10.3*   HCT 21.1* 29.2*   PLT 57* 50*     CMP:   Recent Labs   Lab 01/23/23 1942 01/24/23  1209   * 128*   K 4.1 4.0   CL 89* 91*   CO2 25 25   * 364*   BUN 39* 47*   CREATININE 4.5* 5.2*   CALCIUM 8.6* 8.3*   PROT 6.6 5.9*   ALBUMIN 3.1* 2.7*   BILITOT 2.0* 1.7*   ALKPHOS 131 119   AST 17 15   ALT 13 12   ANIONGAP 14 12       Significant Imaging: I have reviewed all pertinent imaging results/findings within the past 24 hours.

## 2023-01-25 NOTE — NURSING
Pt. Been crying (tearless) almost all night . Says she has pain all over the body.Night MD was contacted Dr. Washington as she ordered IV tylenol . Which was given . No other   PRN med ordered. Pt. Says she was having med name called Dilaudid earlier why she can not have that. MD notified

## 2023-01-25 NOTE — ASSESSMENT & PLAN NOTE
This is a chronic issue and abdominal pain mysteriously disappear always always with dilaudid only  Was here few days ago and went home with dilaudid  High chance for gastroparesis but unable to give reglan because pt suffers from seizure

## 2023-01-25 NOTE — PROGRESS NOTES
Pt arrived to floor at 1745. VSS on room air. Pt having 10/10 pain, complaining of abdominal pain. Pt tearful. Informed pt no pain meds were ordered, Dr. Garsia notified. Pt's BG also low on floor admission, 53. Treated per MAR, rechecked 136. Dr. Garsia notified.     x1 dose hyoscyamine ordered, till waiting for pharmacy to send. Lactulose given. Pt asking for something IV, stating she was given dilaudid. Informed pt dilaudid was not ordered.

## 2023-01-25 NOTE — HOSPITAL COURSE
Patient again got admitted with abdominal pain and specifically asked for dilaudid to relieve her pain   This is a chronic issue for her and since October 2022 she already had 12  CT scans of abdomen /Pelvis/ Chest  This time also she had abdomen CT scan on arrival to ER  Dilaudid was not provided and she got discharged after Hemodialysis  Pt was noted to be suffering from abdominal pain only when staff is around   Other times she watch TV and giggles  At the time of dictation of this DC summary pt is in different hospital ER  and already had another CT per renal stone protocol  She should not get admitted with chronic abdominal pain because she clearly has drug seeking behavior

## 2023-01-25 NOTE — ED PROVIDER NOTES
Encounter Date: 2023    SCRIBE #1 NOTE: I, Shelbi Islas, am scribing for, and in the presence of,  JANE Gold.     History     Chief Complaint   Patient presents with    Abdominal Pain     Abd pain for past 2 days, last dialysis was last week, pt is suppose to have dialysis Tuesday, Thursday and Saturday.     Back Pain     Time seen by provider: 2:41 PM on 2023    Tabby Howard is a 33 y.o. female with a PMHx of DM II, GERD, heart failure with preserved ejection fraction, Sickle cell trait, gastroparesis, and CKD who presents to the ED for evaluation of diffuse abdominal pain that is rated a 10/10 and onset 2 days ago.  She is a current Tuesday, Thursday, Saturday dialysis patient that does not make urine, and was most recently dialyzed last week.  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  She explains these Sx have been worsening over the past couple of days.  She confirms SOB and N/V, but denies bloody stool, bloody vomit, numbness, weakness or any other symptoms at this time.      Review of external records performed showing:  Patient was evaluated on 23 by [facility] for similar Sx with admit, secondary to symptomatic anemia.  She was d/c earlier today at 1345 with arrival to this ED 20 minutes later.  PSHx includes EGD, , R placement of dual-lumen vascular catheter, cholecystectomy, EGD and colonoscopy.      The history is provided by the patient.   Review of patient's allergies indicates:   Allergen Reactions    Penicillins Hives     Past Medical History:   Diagnosis Date    CKD (chronic kidney disease), stage IV 2022    Diabetes mellitus due to underlying condition with unspecified complications 2022    Gastroparesis 2022    Heart failure with preserved ejection fraction 2022    EF 55% on 3/22    History of gastroesophageal reflux (GERD)     History of supraventricular tachycardia     Hyperkalemia  2022    Hypertensive emergency 2022    Sickle cell trait 2022    Type 2 diabetes mellitus      Past Surgical History:   Procedure Laterality Date     SECTION      x 3    COLONOSCOPY      COLONOSCOPY N/A 2022    Procedure: COLONOSCOPY;  Surgeon: Jagdeep Cedeno MD;  Location: [facility];  Service: Endoscopy;  Laterality: N/A;    ESOPHAGOGASTRODUODENOSCOPY N/A 10/18/2019    Procedure: ESOPHAGOGASTRODUODENOSCOPY (EGD);  Surgeon: Gianluca Mendez MD;  [facility];  Service: Endoscopy;  Laterality: N/A;    ESOPHAGOGASTRODUODENOSCOPY N/A 2022    Procedure: EGD (ESOPHAGOGASTRODUODENOSCOPY);  Surgeon: Micky Paredes III, MD;  Location: [facility];  Service: Endoscopy;  Laterality: N/A;    ESOPHAGOGASTRODUODENOSCOPY N/A 2022    Procedure: EGD (ESOPHAGOGASTRODUODENOSCOPY);  Surgeon: Marcelo Zhong MD;  Location: [facility];  Service: Endoscopy;  Laterality: N/A;    LAPAROSCOPIC CHOLECYSTECTOMY N/A 2022    Procedure: CHOLECYSTECTOMY, LAPAROSCOPIC;  Surgeon: Grey Perez MD;  Location: [facility];  Service: General;  Laterality: N/A;    PLACEMENT OF DUAL-LUMEN VASCULAR CATHETER Left 2022    Procedure: INSERTION-CATHETER-JOSEPH;  Surgeon: Dionte Gan MD;  Location: [facility];  Service: General;  Laterality: Left;    PLACEMENT OF DUAL-LUMEN VASCULAR CATHETER Right 2022    Procedure: INSERTION-CATHETER-Hemosplit;  Surgeon: Dionte Gan MD;  Location: [facility];  Service: General;  Laterality: Right;     Family History   Problem Relation Age of Onset    Diabetes Mother     Diabetes Father      Social History     Tobacco Use    Smoking status: Never    Smokeless tobacco: Never   Substance Use Topics    Alcohol use: Not Currently    Drug use: No     Review of Systems   Constitutional:  Negative for fever.   HENT:  Negative for sore throat.    Eyes:  Positive for visual disturbance (blind on R eye, blurry on L).   Respiratory:  Positive for shortness of breath.     Cardiovascular:  Negative for chest pain.   Gastrointestinal:  Positive for abdominal pain, nausea and vomiting (no blood). Negative for blood in stool.   Genitourinary:  Negative for dysuria.   Musculoskeletal:  Negative for back pain.   Skin:  Negative for rash.   Neurological:  Negative for weakness and numbness.   Hematological:  Does not bruise/bleed easily.     Physical Exam     Initial Vitals   BP Pulse Resp Temp SpO2   01/25/23 1415 01/25/23 1415 01/25/23 1415 01/25/23 1442 01/25/23 1415   (!) 181/123 97 19 98 °F (36.7 °C) 100 %      MAP       --                Physical Exam    Nursing note and vitals reviewed.  Constitutional: Vital signs are normal. She appears well-developed and well-nourished.   HENT:   Head: Normocephalic and atraumatic.   Eyes: Pupils are equal, round, and reactive to light.   Neck: Neck supple.   Cardiovascular:  Regular rhythm, normal heart sounds and intact distal pulses.   Tachycardia present.   Exam reveals no gallop and no friction rub.       No murmur heard.  Pulmonary/Chest: Breath sounds normal. She has no wheezes. She has no rhonchi. She has no rales.   R chest wall HemoSplit.    Abdominal: Abdomen is soft. Bowel sounds are normal. She exhibits no distension and no mass. There is generalized abdominal tenderness. There is no rebound and no guarding.   Musculoskeletal:      Cervical back: Neck supple.      Comments: No obvious anasarca.       Neurological: She is alert and oriented to person, place, and time. She has normal strength.   Skin: Skin is warm, dry and intact.   Psychiatric: Her speech is normal and behavior is normal. Her mood appears anxious.       ED Course   Procedures  Labs Reviewed   CBC W/ AUTO DIFFERENTIAL - Abnormal; Notable for the following components:       Result Value    RBC 3.96 (*)     Hemoglobin 11.3 (*)     Hematocrit 32.0 (*)     MCV 81 (*)     RDW 15.7 (*)     Platelets 58 (*)     Immature Granulocytes 0.9 (*)     Gran # (ANC) 7.9 (*)      Immature Grans (Abs) 0.08 (*)     Lymph # 0.5 (*)     Mono # 0.2 (*)     Gran % 90.1 (*)     Lymph % 6.0 (*)     Mono % 2.6 (*)     All other components within normal limits   COMPREHENSIVE METABOLIC PANEL - Abnormal; Notable for the following components:    Glucose 123 (*)     BUN 33 (*)     Creatinine 4.4 (*)     Albumin 3.2 (*)     Total Bilirubin 1.4 (*)     Alkaline Phosphatase 160 (*)     eGFR 13 (*)     All other components within normal limits   TROPONIN I - Abnormal; Notable for the following components:    Troponin I 0.040 (*)     All other components within normal limits   POCT GLUCOSE - Abnormal; Notable for the following components:    POCT Glucose 50 (*)     All other components within normal limits   POCT GLUCOSE - Abnormal; Notable for the following components:    POCT Glucose 48 (*)     All other components within normal limits   POCT GLUCOSE - Abnormal; Notable for the following components:    POCT Glucose 42 (*)     All other components within normal limits   POCT GLUCOSE - Abnormal; Notable for the following components:    POCT Glucose 48 (*)     All other components within normal limits   LIPASE   POCT GLUCOSE   POCT GLUCOSE   POCT GLUCOSE   POCT GLUCOSE MONITORING CONTINUOUS   POCT GLUCOSE MONITORING CONTINUOUS     EKG Readings: (Independently Interpreted)   Normal sinus rhythm at a ventricular rate of 91 bpm with low voltage QRS and nonspecific ST changes.      Imaging Results              X-Ray Chest PA And Lateral (Final result)  Result time 01/25/23 17:01:16      Final result by Gianluca Arriaga Jr., MD (01/25/23 17:01:16)                   Impression:      Cardiomegaly in a biventricular pattern.  Double-lumen central line ends in the right atrium and ventricle.      Electronically signed by: Gianluca Arriaga MD  Date:    01/25/2023  Time:    17:01               Narrative:    EXAMINATION:  XR CHEST PA AND LATERAL    CLINICAL HISTORY:  Unspecified abdominal pain    TECHNIQUE:  PA and  lateral views of the chest were performed.    COMPARISON:  Chest x-ray of January 23, 2023., CT chest of January 23, 2023.    FINDINGS:  There is cardiomegaly in a biventricular pattern.  A double lumen central line enters on the right and ends in the right and ventricle..  An intrapulmonary mass or infiltrate is not seen.  There is no pneumothorax or pleural effusion.                                       CT Renal Stone Study ABD Pelvis WO (Final result)  Result time 01/25/23 16:35:15      Final result by Raymundo Min MD (01/25/23 16:35:15)                   Impression:      1. No evidence of a definite acute abdominopelvic abnormality or significant interval detrimental change as compared to the prior study on 01/23/2023.  2. Persistent large pericardial effusion, similar to prior study.  3. New 7 mm ground-glass nodular opacity in the right lower lobe, nonspecific and may reflect small airways inflammation or infection.  4. Diffuse anasarca.      Electronically signed by: Raymundo Min  Date:    01/25/2023  Time:    16:35               Narrative:    EXAMINATION:  CT RENAL STONE STUDY ABD PELVIS WO    CLINICAL HISTORY:  Flank pain, kidney stone suspected;    TECHNIQUE:  Low dose axial images, sagittal and coronal reformations were obtained from the lung bases to the pubic symphysis.  Contrast was not administered.    COMPARISON:  CT chest, abdomen and pelvis 01/23/2023    FINDINGS:  Lack of intravenous contrast limits detection for parenchymal organ disease.    Heart: Partially imaged dialysis catheter entering the right ventricle, unchanged.  Persistent large pericardial effusion, similar to prior study.  Heart is mildly enlarged.    Lung Bases: Scattered bandlike densities at the left lung base again demonstrated possibly reflecting subsegmental atelectasis versus scarring.  New 7 mm ground-glass nodule in the posterior basal segment of the right lower lobe, nonspecific and may reflect small airways inflammation  or infection.  No pleural effusion.    Liver: Mildly prominent, measuring 17 cm in size.  No focal hepatic lesion.    Gallbladder: Surgically absent.    Bile Ducts: No evidence of dilated ducts.    Pancreas: No mass or peripancreatic fat stranding.    Spleen: Unremarkable.    Adrenals: Unremarkable.    Kidneys/ Ureters: No nephrolithiasis or hydronephrosis.  No obvious ureterolithiasis.    Bladder: No evidence of wall thickening.    Reproductive organs: Unremarkable.    GI Tract/Mesentery: Stomach and visualized lower esophagus are distended with ingested contents.  Moderate stool burden throughout the colon, which may reflect constipation.  No evidence of bowel obstruction.  No evidence of appendicitis.  Diffuse mesenteric edema, similar to prior study    Peritoneal Space: No ascites. No free air.    Retroperitoneum: No significant adenopathy.    Abdominal wall/soft tissue: Diffuse anasarca.    Vasculature: Scattered calcific atherosclerotic disease.  No evidence of abdominal aortic aneurysm.    Bones: No acute fracture.                                       Medications   amLODIPine tablet 10 mg (has no administration in time range)   carvediloL tablet 25 mg (has no administration in time range)   FLUoxetine capsule 20 mg (has no administration in time range)   furosemide tablet 40 mg (has no administration in time range)   gabapentin capsule 100 mg (has no administration in time range)   hydrALAZINE tablet 100 mg (has no administration in time range)   isosorbide mononitrate 24 hr tablet 120 mg (has no administration in time range)   levETIRAcetam tablet 500 mg (has no administration in time range)   losartan tablet 100 mg (has no administration in time range)   hydroCHLOROthiazide tablet 12.5 mg (has no administration in time range)   mirtazapine tablet 15 mg (has no administration in time range)   pantoprazole EC tablet 40 mg (has no administration in time range)   sodium chloride 0.9% flush 3 mL (has no  administration in time range)   melatonin tablet 9 mg (has no administration in time range)   ondansetron injection 8 mg (has no administration in time range)   simethicone chewable tablet 80 mg (has no administration in time range)   aluminum-magnesium hydroxide-simethicone 200-200-20 mg/5 mL suspension 30 mL (has no administration in time range)   acetaminophen tablet 650 mg (has no administration in time range)   naloxone 0.4 mg/mL injection 0.02 mg (has no administration in time range)   potassium bicarbonate disintegrating tablet 50 mEq (has no administration in time range)   potassium bicarbonate disintegrating tablet 35 mEq (has no administration in time range)   potassium bicarbonate disintegrating tablet 60 mEq (has no administration in time range)   magnesium oxide tablet 800 mg (has no administration in time range)   magnesium oxide tablet 800 mg (has no administration in time range)   potassium, sodium phosphates 280-160-250 mg packet 2 packet (has no administration in time range)   potassium, sodium phosphates 280-160-250 mg packet 2 packet (has no administration in time range)   potassium, sodium phosphates 280-160-250 mg packet 2 packet (has no administration in time range)   glucose chewable tablet 16 g (has no administration in time range)   glucose chewable tablet 24 g (has no administration in time range)   glucagon (human recombinant) injection 1 mg (has no administration in time range)   dextrose 10% bolus 125 mL 125 mL (has no administration in time range)   dextrose 10% bolus 250 mL 250 mL (has no administration in time range)   senna-docusate 8.6-50 mg per tablet 1 tablet (has no administration in time range)   prochlorperazine injection Soln 5 mg (has no administration in time range)   promethazine (PHENERGAN) 12.5 mg in dextrose 5 % (D5W) 50 mL IVPB (has no administration in time range)   insulin aspart U-100 pen 1-10 Units (has no administration in time range)   aluminum-magnesium  hydroxide-simethicone 200-200-20 mg/5 mL suspension 30 mL (has no administration in time range)     And   LIDOcaine HCl 2% oral solution 15 mL (has no administration in time range)   hyoscyamine injection 0.5 mg (0.5 mg Intravenous Given 1/25/23 1519)   acetaminophen tablet 1,000 mg (1,000 mg Oral Given 1/25/23 1524)   dextrose 50 % in water (D50W) injection 25 g (25 g Intravenous Given 1/25/23 1511)   ondansetron injection 4 mg (4 mg Intravenous Given 1/25/23 1713)   dextrose 50 % in water (D50W) injection 25 g (25 g Intravenous Given 1/25/23 1848)   promethazine (PHENERGAN) 12.5 mg in dextrose 5 % (D5W) 50 mL IVPB (12.5 mg Intravenous New Bag 1/25/23 1928)     Medical Decision Making:   History:   Old Medical Records: I decided to obtain old medical records.  Differential Diagnosis:   Obstructive uropathy  Gastroparesis  SBO  Independently Interpreted Test(s):   I have ordered and independently interpreted EKG Reading(s) - see prior notes  Clinical Tests:   Lab Tests: Ordered and Reviewed  Radiological Study: Ordered and Reviewed  Medical Tests: Ordered and Reviewed     APC / Resident Notes:   Patient is a 33 y.o. female who presents to the ED 01/25/2023 who underwent emergent evaluation for flank and abdominal pain.  Patient reports many similar instances in the past.  She states she is unsure what the cause is.  She states it tends to go away on its own after several days.  Patient does have 2 episodes of hypoglycemia.  She was given Levemir this morning at another hospital.  States her nausea is better but refuses to eat while in the emergency department.  She remains alert while in the emergency department.  She is given IV dextrose with resolution of her hypoglycemia however I feel patient should be admitted for further monitoring of this given she is not tolerating p.o..  Patient likely has gastroparesis.  Her abdominal pain appears to be chronic.  No acute findings on CT and I do not think other emergent  process such as bowel obstruction, appendicitis, or abscess.  There is no evidence of any acute infectious process requiring antibiotics at this time.  Labs consistent with end-stage renal disease.  No severe electrolyte abnormalities or hyperkalemia noted.  Bilirubin chronically elevated.  No transaminitis.  Lipase normal.  I do not think pancreatitis or other acute process such as cholangitis or acute choledocholithiasis.  Initial hypertension improves in the emergency department with pain and nausea control.  I do not think hypertensive crisis, emergency, or urgency.  Troponin troponin chronically elevated likely due to end-stage renal disease.  EKG today without evidence of ischemia and I do not think ACS.  I do not think other emergent process such as PE or dissection.  Patient is admitted to hospital medicine team for further evaluation and treatment of intractable vomiting and refractory hypoglycemia.  Case discussed with hospital medicine team is accepting of this admission.  Case also discussed with Dr. Varghese who is agreeable to plan of care.              Scribe Attestation:   Scribe #1: I performed the above scribed service and the documentation accurately describes the services I performed. I attest to the accuracy of the note.      ED Course as of 01/25/23 1958 Wed Jan 25, 2023   1730 Pt states she's feeling better. She doesn't want to eat right now but her nausea is improving.   [JK]   1859 Pt awake and vomited. Given D50 and will give more antiemetic and admit for intractable vomiting and hypoglycemia.  [JK]      ED Course User Index  [JK] Linette Robison NP                   Clinical Impression:   Final diagnoses:  [R10.9] Abdominal pain  [E16.2] Hypoglycemia (Primary)        ED Disposition Condition    Observation Stable                Linette Robison NP  01/25/23 1958

## 2023-01-25 NOTE — PLAN OF CARE
Problem: Adult Inpatient Plan of Care  Goal: Plan of Care Review  Outcome: Met  Goal: Patient-Specific Goal (Individualized)  Outcome: Met  Goal: Absence of Hospital-Acquired Illness or Injury  Outcome: Met  Goal: Optimal Comfort and Wellbeing  Outcome: Met  Goal: Readiness for Transition of Care  Outcome: Met     Problem: Device-Related Complication Risk (Hemodialysis)  Goal: Safe, Effective Therapy Delivery  Outcome: Met     Problem: Hemodynamic Instability (Hemodialysis)  Goal: Effective Tissue Perfusion  Outcome: Met     Problem: Infection (Hemodialysis)  Goal: Absence of Infection Signs and Symptoms  Outcome: Met     Problem: Diabetes Comorbidity  Goal: Blood Glucose Level Within Targeted Range  Outcome: Met     Problem: Infection  Goal: Absence of Infection Signs and Symptoms  Outcome: Met

## 2023-01-25 NOTE — NURSING
Discharge instructions reviewed with pt.  IV removed without difficulty, catheter intact.  Pt awaiting personal transportation.

## 2023-01-25 NOTE — PLAN OF CARE
Patient cleared for discharge from case management standpoint.    Follow up appointments scheduled and added to AVS. [facility] will call patient with appointm,ent    Chart and discharge orders reviewed.  Patient discharged home with no further case management needs.       01/25/23 1223   Final Note   Assessment Type Final Discharge Note   Anticipated Discharge Disposition Home   Hospital Resources/Appts/Education Provided Provided patient/caregiver with written discharge plan information   Post-Acute Status   Discharge Delays None known at this time

## 2023-01-25 NOTE — ED NOTES
Tabby Howard presents to the ED with c/o back pain and abdominal pain. Patient was discharged from [facility] today. Patient is moaning no tears at this time. She is flailing back and forth in bed.     ALLEN VSS  Verified patient's name and date of birth.   Patient reports nausea and emesis.

## 2023-01-26 LAB
ALBUMIN SERPL BCP-MCNC: 3.4 G/DL (ref 3.5–5.2)
ALP SERPL-CCNC: 165 U/L (ref 55–135)
ALT SERPL W/O P-5'-P-CCNC: 14 U/L (ref 10–44)
ANION GAP SERPL CALC-SCNC: 18 MMOL/L (ref 8–16)
AST SERPL-CCNC: 31 U/L (ref 10–40)
BASOPHILS # BLD AUTO: 0.02 K/UL (ref 0–0.2)
BASOPHILS NFR BLD: 0.2 % (ref 0–1.9)
BILIRUB SERPL-MCNC: 1.9 MG/DL (ref 0.1–1)
BUN SERPL-MCNC: 38 MG/DL (ref 6–20)
CALCIUM SERPL-MCNC: 9.4 MG/DL (ref 8.7–10.5)
CHLORIDE SERPL-SCNC: 97 MMOL/L (ref 95–110)
CO2 SERPL-SCNC: 24 MMOL/L (ref 23–29)
CREAT SERPL-MCNC: 5.1 MG/DL (ref 0.5–1.4)
DIFFERENTIAL METHOD: ABNORMAL
EOSINOPHIL # BLD AUTO: 0 K/UL (ref 0–0.5)
EOSINOPHIL NFR BLD: 0.3 % (ref 0–8)
ERYTHROCYTE [DISTWIDTH] IN BLOOD BY AUTOMATED COUNT: 15.7 % (ref 11.5–14.5)
EST. GFR  (NO RACE VARIABLE): 11 ML/MIN/1.73 M^2
GLUCOSE SERPL-MCNC: 149 MG/DL (ref 70–110)
HCT VFR BLD AUTO: 33.7 % (ref 37–48.5)
HGB BLD-MCNC: 11.9 G/DL (ref 12–16)
IMM GRANULOCYTES # BLD AUTO: 0.05 K/UL (ref 0–0.04)
IMM GRANULOCYTES NFR BLD AUTO: 0.6 % (ref 0–0.5)
LYMPHOCYTES # BLD AUTO: 0.8 K/UL (ref 1–4.8)
LYMPHOCYTES NFR BLD: 8.3 % (ref 18–48)
MAGNESIUM SERPL-MCNC: 2.2 MG/DL (ref 1.6–2.6)
MCH RBC QN AUTO: 28.7 PG (ref 27–31)
MCHC RBC AUTO-ENTMCNC: 35.3 G/DL (ref 32–36)
MCV RBC AUTO: 81 FL (ref 82–98)
MONOCYTES # BLD AUTO: 0.3 K/UL (ref 0.3–1)
MONOCYTES NFR BLD: 3 % (ref 4–15)
NEUTROPHILS # BLD AUTO: 7.9 K/UL (ref 1.8–7.7)
NEUTROPHILS NFR BLD: 87.6 % (ref 38–73)
NRBC BLD-RTO: 0 /100 WBC
PHOSPHATE SERPL-MCNC: 4.8 MG/DL (ref 2.7–4.5)
PLATELET # BLD AUTO: 62 K/UL (ref 150–450)
PMV BLD AUTO: 9.6 FL (ref 9.2–12.9)
POCT GLUCOSE: 116 MG/DL (ref 70–110)
POCT GLUCOSE: 192 MG/DL (ref 70–110)
POCT GLUCOSE: 203 MG/DL (ref 70–110)
POCT GLUCOSE: 241 MG/DL (ref 70–110)
POCT GLUCOSE: 82 MG/DL (ref 70–110)
POTASSIUM SERPL-SCNC: 3.9 MMOL/L (ref 3.5–5.1)
PROT SERPL-MCNC: 7.3 G/DL (ref 6–8.4)
RBC # BLD AUTO: 4.14 M/UL (ref 4–5.4)
SODIUM SERPL-SCNC: 139 MMOL/L (ref 136–145)
TROPONIN I SERPL DL<=0.01 NG/ML-MCNC: 0.06 NG/ML (ref 0–0.03)
WBC # BLD AUTO: 9.06 K/UL (ref 3.9–12.7)

## 2023-01-26 PROCEDURE — 63600175 PHARM REV CODE 636 W HCPCS: Performed by: NURSE PRACTITIONER

## 2023-01-26 PROCEDURE — 84100 ASSAY OF PHOSPHORUS: CPT | Performed by: NURSE PRACTITIONER

## 2023-01-26 PROCEDURE — G0378 HOSPITAL OBSERVATION PER HR: HCPCS

## 2023-01-26 PROCEDURE — G0257 UNSCHED DIALYSIS ESRD PT HOS: HCPCS

## 2023-01-26 PROCEDURE — 80100014 HC HEMODIALYSIS 1:1

## 2023-01-26 PROCEDURE — 96366 THER/PROPH/DIAG IV INF ADDON: CPT

## 2023-01-26 PROCEDURE — 85025 COMPLETE CBC W/AUTO DIFF WBC: CPT | Performed by: NURSE PRACTITIONER

## 2023-01-26 PROCEDURE — 96372 THER/PROPH/DIAG INJ SC/IM: CPT | Mod: 59 | Performed by: NURSE PRACTITIONER

## 2023-01-26 PROCEDURE — 84484 ASSAY OF TROPONIN QUANT: CPT | Performed by: NURSE PRACTITIONER

## 2023-01-26 PROCEDURE — 82962 GLUCOSE BLOOD TEST: CPT

## 2023-01-26 PROCEDURE — 96376 TX/PRO/DX INJ SAME DRUG ADON: CPT

## 2023-01-26 PROCEDURE — 94761 N-INVAS EAR/PLS OXIMETRY MLT: CPT

## 2023-01-26 PROCEDURE — 25000003 PHARM REV CODE 250: Performed by: NURSE PRACTITIONER

## 2023-01-26 PROCEDURE — 80053 COMPREHEN METABOLIC PANEL: CPT | Performed by: NURSE PRACTITIONER

## 2023-01-26 PROCEDURE — 96375 TX/PRO/DX INJ NEW DRUG ADDON: CPT

## 2023-01-26 PROCEDURE — 83735 ASSAY OF MAGNESIUM: CPT | Performed by: NURSE PRACTITIONER

## 2023-01-26 RX ORDER — FERROUS GLUCONATE 324(38)MG
324 TABLET ORAL
Status: DISCONTINUED | OUTPATIENT
Start: 2023-01-27 | End: 2023-01-27 | Stop reason: HOSPADM

## 2023-01-26 RX ORDER — LORAZEPAM 2 MG/ML
1 INJECTION INTRAMUSCULAR ONCE
Status: COMPLETED | OUTPATIENT
Start: 2023-01-26 | End: 2023-01-26

## 2023-01-26 RX ADMIN — CARVEDILOL 25 MG: 25 TABLET, FILM COATED ORAL at 10:01

## 2023-01-26 RX ADMIN — DOCUSATE SODIUM AND SENNOSIDES 1 TABLET: 8.6; 5 TABLET, FILM COATED ORAL at 09:01

## 2023-01-26 RX ADMIN — FUROSEMIDE 40 MG: 40 TABLET ORAL at 10:01

## 2023-01-26 RX ADMIN — LEVETIRACETAM 500 MG: 500 TABLET, FILM COATED ORAL at 10:01

## 2023-01-26 RX ADMIN — PROCHLORPERAZINE EDISYLATE 5 MG: 5 INJECTION INTRAMUSCULAR; INTRAVENOUS at 04:01

## 2023-01-26 RX ADMIN — ISOSORBIDE MONONITRATE 120 MG: 60 TABLET, EXTENDED RELEASE ORAL at 10:01

## 2023-01-26 RX ADMIN — LORAZEPAM 1 MG: 2 INJECTION INTRAMUSCULAR; INTRAVENOUS at 01:01

## 2023-01-26 RX ADMIN — AMLODIPINE BESYLATE 10 MG: 5 TABLET ORAL at 10:01

## 2023-01-26 RX ADMIN — INSULIN ASPART 2 UNITS: 100 INJECTION, SOLUTION INTRAVENOUS; SUBCUTANEOUS at 09:01

## 2023-01-26 RX ADMIN — ONDANSETRON 8 MG: 2 INJECTION INTRAMUSCULAR; INTRAVENOUS at 10:01

## 2023-01-26 RX ADMIN — FUROSEMIDE 40 MG: 40 TABLET ORAL at 09:01

## 2023-01-26 RX ADMIN — GABAPENTIN 100 MG: 100 CAPSULE ORAL at 10:01

## 2023-01-26 RX ADMIN — ACETAMINOPHEN 650 MG: 325 TABLET ORAL at 09:01

## 2023-01-26 RX ADMIN — MIRTAZAPINE 15 MG: 15 TABLET, FILM COATED ORAL at 09:01

## 2023-01-26 RX ADMIN — CARVEDILOL 25 MG: 25 TABLET, FILM COATED ORAL at 09:01

## 2023-01-26 RX ADMIN — ONDANSETRON 8 MG: 2 INJECTION INTRAMUSCULAR; INTRAVENOUS at 07:01

## 2023-01-26 RX ADMIN — FLUOXETINE HYDROCHLORIDE 20 MG: 20 CAPSULE ORAL at 10:01

## 2023-01-26 RX ADMIN — HYDROCHLOROTHIAZIDE 12.5 MG: 12.5 TABLET ORAL at 10:01

## 2023-01-26 RX ADMIN — LOSARTAN POTASSIUM 100 MG: 100 TABLET, FILM COATED ORAL at 10:01

## 2023-01-26 RX ADMIN — PANTOPRAZOLE SODIUM 40 MG: 40 TABLET, DELAYED RELEASE ORAL at 10:01

## 2023-01-26 RX ADMIN — GABAPENTIN 100 MG: 100 CAPSULE ORAL at 09:01

## 2023-01-26 RX ADMIN — LEVETIRACETAM 500 MG: 500 TABLET, FILM COATED ORAL at 09:01

## 2023-01-26 RX ADMIN — HYDRALAZINE HYDROCHLORIDE 100 MG: 25 TABLET, FILM COATED ORAL at 09:01

## 2023-01-26 RX ADMIN — Medication 324 MG: at 11:01

## 2023-01-26 RX ADMIN — ONDANSETRON 8 MG: 2 INJECTION INTRAMUSCULAR; INTRAVENOUS at 04:01

## 2023-01-26 NOTE — ED NOTES
Patient resting in bed with eyes closed, chest rise is symmetrical, respirations are regular and unlabored. PATRIZIA VILLA

## 2023-01-26 NOTE — ASSESSMENT & PLAN NOTE
Admit to obs  The patient has had multiple admissions for similar complaints, with notes regarding pain med seeking behavior, where she states she only likes Dilaudid IV. Of note, she was discharged from [facility] today and arrived to [facility] 20 minutes later, complaining of intractable abdominal pain and nausea.     Zofran, Compazine for nausea PRN, watch Qtc  Avoid narcotics if possible, try gabapentin     Advance diet as tolerated

## 2023-01-26 NOTE — DISCHARGE SUMMARY
[facility] Medicine  Discharge Summary      Patient Name: Tabby Howard  MRN: [id number]  UNIQUE: [id number]  Patient Class: IP- Inpatient  Admission Date: 1/23/2023  Hospital Length of Stay: 2 days  Discharge Date and Time:  01/25/2023 6:22 PM  Attending Physician: No att. providers found   Discharging Provider: Alfonso Garsia MD  Primary Care Provider: [facility]    Primary Care Team: Networked reference to record PCT     HPI:   Patient is 32 y/o AA female that presents to the ER for evaluation of abdominal pain. She has complex history of HTN, ESRD with diabetes, gastroparesis, and  anemia. She has had multiple admissions for abdominal pain. She reports that symptoms started a few days ago with worsening. She reports being med compliant. She denies any recent sick contacts. She denies any recent injury or trauma. Labs  reviewed and  H/H decreased and with associated symptoms transfusion started in ER. She denies  any fever or chills.       * No surgery found *      Hospital Course:   Patient again got admitted with abdominal pain and specifically asked for dilaudid to relieve her pain   This is a chronic issue for her and since October 2022 she already had 12  CT scans of abdomen /Pelvis/ Chest  This time also she had abdomen CT scan on arrival to ER  Dilaudid was not provided and she got discharged after Hemodialysis  Pt was noted to be suffering from abdominal pain only when staff is around   Other times she watch TV and giggles  At the time of dictation of this DC summary pt is in different hospital ER  and already had another CT per renal stone protocol  She should not get admitted with chronic abdominal pain because she clearly has drug seeking behavior            Goals of Care Treatment Preferences:  Code Status: Full Code    Health care agent: Step-Mother Tanya Howard / Father Garcia Howard  Health care agent number: [phone] / [phone]           What is most important right now is to focus on remaining as independent as possible.  Accordingly, we have decided that the best plan to meet the patient's goals includes continuing with treatment.      Consults:   Consults (From admission, onward)        Status Ordering Provider     Inpatient consult to Nephrology  Once        Provider:  Hugh Figueroa MD    Completed GENET GARAY          No new Assessment & Plan notes have been filed under this hospital service since the last note was generated.  Service: Hospital Medicine    Final Active Diagnoses:    Diagnosis Date Noted POA    Pericardial effusion [I31.39] 03/07/2022 Yes    Anemia of chronic kidney failure, stage 5 [N18.5, D63.1] 10/31/2022 Yes     Chronic    Drug-seeking behavior [Z76.5] 01/24/2023 Yes    Hypertension [I10] 07/30/2022 Yes     Chronic    ESRD (end stage renal disease) on dialysis [N18.6, Z99.2] 07/22/2022 Not Applicable     Chronic    Seizure disorder [G40.909] 05/29/2022 Yes     Chronic    Gastroparesis - suspected, unconfirmed [K31.84] 04/12/2022 Yes     Chronic    Type 2 diabetes mellitus with chronic kidney disease on chronic dialysis, with long-term current use of insulin [E11.22, N18.6, Z99.2, Z79.4] 10/16/2019 Not Applicable     Chronic      Problems Resolved During this Admission:    Diagnosis Date Noted Date Resolved POA    PRINCIPAL PROBLEM:  Abdominal pain [R10.9] 01/24/2023 01/25/2023 Unknown       Discharged Condition: good    Disposition: Home or Self Care    Follow Up:    Patient Instructions:   No discharge procedures on file.    Significant Diagnostic Studies: Labs:   CMP   Recent Labs   Lab 01/24/23  1209 01/25/23  0544 01/25/23  1600   * 131* 137   K 4.0 4.4 3.6   CL 91* 96 99   CO2 25 24 26   * 229* 123*   BUN 47* 30* 33*   CREATININE 5.2* 3.7* 4.4*   CALCIUM 8.3* 8.2* 9.3   PROT 5.9* 5.8* 7.0   ALBUMIN 2.7* 2.9* 3.2*   BILITOT 1.7* 1.3* 1.4*   ALKPHOS 119 124 160*   AST 15 18 18   ALT 12 10 10  "  ANIONGAP 12 11 12    and CBC   Recent Labs   Lab 01/24/23  1209 01/25/23  0544 01/25/23  1600   WBC 7.96 6.72 8.82   HGB 10.3* 9.7* 11.3*   HCT 29.2* 27.1* 32.0*   PLT 50* 56* 58*       Pending Diagnostic Studies:     Procedure Component Value Units Date/Time    Drugs of Abuse Screen, Blood [[id number]] Collected: 01/23/23 2207    Order Status: Sent Lab Status: In process Updated: 01/23/23 2211    Specimen: Blood          Medications:  Reconciled Home Medications:      Medication List      CONTINUE taking these medications    albuterol 90 mcg/actuation inhaler  Commonly known as: PROVENTIL/VENTOLIN HFA  Inhale 2 puffs into the lungs every 6 (six) hours as needed for Wheezing. Rescue     amLODIPine 10 MG tablet  Commonly known as: NORVASC  Take 1 tablet (10 mg total) by mouth once daily.     BD ULTRA-FINE MAGALIE PEN NEEDLE 32 gauge x 5/32" Ndle  Generic drug: pen needle, diabetic  Inject into the skin 5 times per day with insulin     carvediloL 25 MG tablet  Commonly known as: COREG  Take 1 tablet (25 mg total) by mouth 2 (two) times daily.     cloNIDine 0.2 mg/24 hr td ptwk 0.2 mg/24 hr  Commonly known as: CATAPRES  Place 1 patch onto the skin every 7 days.     ferrous sulfate 325 (65 FE) MG EC tablet  Take 325 mg by mouth once daily.     FLUoxetine 20 MG capsule  Take 1 capsule (20 mg total) by mouth once daily.     furosemide 40 MG tablet  Commonly known as: LASIX  Take 40 mg by mouth 2 (two) times a day.     gabapentin 100 MG capsule  Commonly known as: NEURONTIN  Take 1 capsule (100 mg total) by mouth 2 (two) times daily.     hydrALAZINE 100 MG tablet  Commonly known as: APRESOLINE  Take 1 tablet (100 mg total) by mouth every 8 (eight) hours.     insulin detemir U-100 100 unit/mL (3 mL) Inpn pen  Commonly known as: Levemir FLEXTOUCH  Inject 9 Units into the skin once daily.     insulin glargine 100 units/mL SubQ pen  Inject 28 Units into the skin once daily.     insulin lispro 100 unit/mL pen  Inject 8 Units into " the skin 3 (three) times daily with meals.     isosorbide mononitrate 60 MG 24 hr tablet  Commonly known as: IMDUR  Take 2 tablets (120 mg total) by mouth once daily.     levETIRAcetam 500 MG Tab  Commonly known as: KEPPRA  Take 1 tablet (500 mg total) by mouth 2 (two) times daily.     losartan-hydrochlorothiazide 100-12.5 mg 100-12.5 mg Tab  Commonly known as: HYZAAR  Take 1 tablet by mouth once daily.     mirtazapine 15 MG disintegrating tablet  Commonly known as: REMERON SOL-TAB  Take 1 tablet (15 mg total) by mouth nightly.     NovoLOG FlexPen U-100 Insulin 100 unit/mL (3 mL) Inpn pen  Generic drug: insulin aspart U-100  Inject 1-10 Units into the skin before meals and at bedtime as needed (Hyperglycemia). Max daily dose 40 units.     ondansetron 4 MG tablet  Commonly known as: ZOFRAN  Take 1 tablet (4 mg total) by mouth every 8 (eight) hours as needed for Nausea.     pantoprazole 40 MG tablet  Commonly known as: PROTONIX  Take 40 mg by mouth once daily.        STOP taking these medications    fluconazole 150 MG Tab  Commonly known as: DIFLUCAN     HYDROmorphone 4 MG tablet  Commonly known as: DILAUDID            Indwelling Lines/Drains at time of discharge:   Lines/Drains/Airways     Central Venous Catheter Line  Duration                Hemodialysis Catheter 12/09/22 right subclavian 47 days              Physical Exam  Cardiovascular:      Rate and Rhythm: Normal rate.   Neurological:      Mental Status: She is alert and oriented to person, place, and time.       Time spent on the discharge of patient: 35 minutes         Alfonso Garsia MD  Department of Hospital Medicine  [facility]

## 2023-01-26 NOTE — PLAN OF CARE
[facility]  Initial Discharge Assessment       Primary Care Provider: [facility]    Admission Diagnosis: Hypoglycemia [E16.2]  Abdominal pain [R10.9]  Chest pain [R07.9]    Admission Date: 1/25/2023  Expected Discharge Date: to be determined    SW obtained discharge assessment information from [facility] in [facility].  Pt lives with father.  See DME below.  Discharge plan to be determined.     Payor: MEDICAID / Plan: [place] / Product Type: Managed Medicaid /     Extended Emergency Contact Information  Primary Emergency Contact: Garcia Howard  Mobile Phone: [phone]  Relation: Father  Preferred language: English   needed? No  Secondary Emergency Contact: Tanya Howard  Mobile Phone: [phone]  Relation: Step parent  Preferred language: English   needed? No    Discharge Plan A: Home  Discharge Plan B: Home      [facility] [address] [place] [address] [address]  [address] [facility] [address]  Phone: [phone] Fax: [phone]    [id number] [address] AT [facility]  [address]  [facility] [address]  Phone: [phone] Fax: [phone]    [place], [place] - [address]  [address]  [facility] [address]  Phone: [phone] Fax: [phone]      Initial Assessment (most recent)       Adult Discharge Assessment - 01/26/23 1347          Discharge Assessment    Assessment Type Discharge Planning Assessment     Source of Information health record     Communicated TATIANA with patient/caregiver No     People in Home parent(s)     Current cognitive status: Unable to Assess     Equipment Currently Used at Home walker, rolling     Readmission within 30 days? Yes     Do you take prescription medications? Yes     Do you have prescription coverage? Yes     Coverage Medicaid     Do you have any problems affording any of  your prescribed medications? TBD     Are you on dialysis? Yes     Dialysis Name and Scheduled days [facility]     Discharge Plan A Home     Discharge Plan B Home

## 2023-01-26 NOTE — ASSESSMENT & PLAN NOTE
Patient's anemia is currently controlled. Has not received any PRBCs to date.. Etiology likely d/t chronic kidney disease  Current CBC reviewed-   Lab Results   Component Value Date    HGB 11.3 (L) 01/25/2023    HCT 32.0 (L) 01/25/2023     Monitor serial CBC and transfuse if patient becomes hemodynamically unstable, symptomatic or H/H drops below 7/21.

## 2023-01-26 NOTE — SUBJECTIVE & OBJECTIVE
Past Medical History:   Diagnosis Date    CKD (chronic kidney disease), stage IV 2022    Diabetes mellitus due to underlying condition with unspecified complications 2022    Gastroparesis 2022    Heart failure with preserved ejection fraction 2022    EF 55% on 3/22    History of gastroesophageal reflux (GERD)     History of supraventricular tachycardia     Hyperkalemia 2022    Hypertensive emergency 2022    Sickle cell trait 2022    Type 2 diabetes mellitus        Past Surgical History:   Procedure Laterality Date     SECTION      x 3    COLONOSCOPY      COLONOSCOPY N/A 2022    Procedure: COLONOSCOPY;  Surgeon: Jagdeep Cedeno MD;  Location: [facility];  Service: Endoscopy;  Laterality: N/A;    ESOPHAGOGASTRODUODENOSCOPY N/A 10/18/2019    Procedure: ESOPHAGOGASTRODUODENOSCOPY (EGD);  Surgeon: Gianluca Mendez MD;  Location: [facility];  Service: Endoscopy;  Laterality: N/A;    ESOPHAGOGASTRODUODENOSCOPY N/A 2022    Procedure: EGD (ESOPHAGOGASTRODUODENOSCOPY);  Surgeon: Micky Paredes III, MD;  Location: [facility];  Service: Endoscopy;  Laterality: N/A;    ESOPHAGOGASTRODUODENOSCOPY N/A 2022    Procedure: EGD (ESOPHAGOGASTRODUODENOSCOPY);  Surgeon: Marcelo Zhong MD;  Location: [facility];  Service: Endoscopy;  Laterality: N/A;    LAPAROSCOPIC CHOLECYSTECTOMY N/A 2022    Procedure: CHOLECYSTECTOMY, LAPAROSCOPIC;  Surgeon: Grey Perez MD;  Location: [facility];  Service: General;  Laterality: N/A;    PLACEMENT OF DUAL-LUMEN VASCULAR CATHETER Left 2022    Procedure: INSERTION-CATHETER-JOSEPH;  Surgeon: Dionte Gan MD;  Location: [facility] OR;  Service: General;  Laterality: Left;    PLACEMENT OF DUAL-LUMEN VASCULAR CATHETER Right 2022    Procedure: INSERTION-CATHETER-Hemosplit;  Surgeon: Dionte Gan MD;  Location: [facility] OR;  Service: General;  Laterality: Right;       Review of patient's allergies indicates:   Allergen Reactions     Penicillins Hives       Current Facility-Administered Medications on File Prior to Encounter   Medication    [COMPLETED] acetaminophen 1,000 mg/100 mL (10 mg/mL) injection 1,000 mg    [COMPLETED] hyoscyamine injection 0.25 mg    [COMPLETED] oxyCODONE immediate release tablet 10 mg    [DISCONTINUED] 0.9%  NaCl infusion (for blood administration)    [DISCONTINUED] amLODIPine tablet 10 mg    [DISCONTINUED] carvediloL tablet 25 mg    [DISCONTINUED] cloNIDine 0.3 mg/24 hr td ptwk 1 patch    [DISCONTINUED] cloNIDine tablet 0.1 mg    [DISCONTINUED] dextrose 10% bolus 125 mL 125 mL    [DISCONTINUED] dextrose 10% bolus 250 mL 250 mL    [DISCONTINUED] epoetin teresa-epbx injection 10,000 Units    [DISCONTINUED] ferrous sulfate tablet 1 each    [DISCONTINUED] FLUoxetine capsule 20 mg    [DISCONTINUED] furosemide tablet 40 mg    [DISCONTINUED] glucagon (human recombinant) injection 1 mg    [DISCONTINUED] glucagon (human recombinant) injection 1 mg    [DISCONTINUED] glucose chewable tablet 16 g    [DISCONTINUED] glucose chewable tablet 24 g    [DISCONTINUED] hydrALAZINE tablet 100 mg    [DISCONTINUED] hydroCHLOROthiazide tablet 12.5 mg    [DISCONTINUED] insulin aspart U-100 pen 1-10 Units    [DISCONTINUED] insulin detemir U-100 pen 28 Units    [DISCONTINUED] isosorbide mononitrate 24 hr tablet 120 mg    [DISCONTINUED] lactulose 20 gram/30 mL solution Soln 20 g    [DISCONTINUED] levETIRAcetam tablet 500 mg    [DISCONTINUED] losartan tablet 100 mg    [DISCONTINUED] mirtazapine tablet 15 mg    [DISCONTINUED] ondansetron disintegrating tablet 4 mg    [DISCONTINUED] pantoprazole EC tablet 40 mg     Current Outpatient Medications on File Prior to Encounter   Medication Sig    albuterol (PROVENTIL/VENTOLIN HFA) 90 mcg/actuation inhaler Inhale 2 puffs into the lungs every 6 (six) hours as needed for Wheezing. Rescue    amLODIPine (NORVASC) 10 MG tablet Take 1 tablet (10 mg total) by mouth once daily.    carvediloL (COREG) 25 MG tablet  "Take 1 tablet (25 mg total) by mouth 2 (two) times daily.    cloNIDine 0.2 mg/24 hr td ptwk (CATAPRES) 0.2 mg/24 hr Place 1 patch onto the skin every 7 days.    ferrous sulfate 325 (65 FE) MG EC tablet Take 325 mg by mouth once daily.    FLUoxetine 20 MG capsule Take 1 capsule (20 mg total) by mouth once daily.    furosemide (LASIX) 40 MG tablet Take 40 mg by mouth 2 (two) times a day.    gabapentin (NEURONTIN) 100 MG capsule Take 1 capsule (100 mg total) by mouth 2 (two) times daily.    hydrALAZINE (APRESOLINE) 100 MG tablet Take 1 tablet (100 mg total) by mouth every 8 (eight) hours.    insulin (LANTUS SOLOSTAR U-100 INSULIN) glargine 100 units/mL SubQ pen Inject 16 Units into the skin every evening.    insulin aspart U-100 (NOVOLOG) 100 unit/mL (3 mL) InPn pen Inject 1-10 Units into the skin before meals and at bedtime as needed (Hyperglycemia). Max daily dose 40 units.    isosorbide mononitrate (IMDUR) 60 MG 24 hr tablet Take 2 tablets (120 mg total) by mouth once daily.    levETIRAcetam (KEPPRA) 500 MG Tab Take 1 tablet (500 mg total) by mouth 2 (two) times daily.    losartan-hydrochlorothiazide 100-12.5 mg (HYZAAR) 100-12.5 mg Tab Take 1 tablet by mouth once daily.    mirtazapine (REMERON SOL-TAB) 15 MG disintegrating tablet Take 1 tablet (15 mg total) by mouth nightly.    ondansetron (ZOFRAN) 4 MG tablet Take 1 tablet (4 mg total) by mouth every 8 (eight) hours as needed for Nausea.    pantoprazole (PROTONIX) 40 MG tablet Take 40 mg by mouth once daily.    pen needle, diabetic (BD ULTRA-FINE MAGALIE PEN NEEDLE) 32 gauge x 5/32" Ndle Inject into the skin 5 times per day with insulin    [DISCONTINUED] atenoloL (TENORMIN) 50 MG tablet Take 1 tablet (50 mg total) by mouth every other day.    [DISCONTINUED] fluconazole (DIFLUCAN) 150 MG Tab Take 150 mg by mouth once daily.    [DISCONTINUED] HYDROmorphone (DILAUDID) 4 MG tablet Take 1 tablet (4 mg total) by mouth every 12 (twelve) hours as needed for Pain.    " [DISCONTINUED] insulin detemir U-100 (LEVEMIR FLEXTOUCH) 100 unit/mL (3 mL) SubQ InPn pen Inject 9 Units into the skin once daily.    [DISCONTINUED] insulin glargine 100 units/mL SubQ pen Inject 28 Units into the skin once daily.    [DISCONTINUED] insulin lispro 100 unit/mL pen Inject 8 Units into the skin 3 (three) times daily with meals.    [DISCONTINUED] omeprazole (PRILOSEC) 20 MG capsule Take 2 capsules (40 mg total) by mouth once daily. for 10 days    [DISCONTINUED] torsemide (DEMADEX) 20 MG Tab Take 1 tablet (20 mg total) by mouth 2 (two) times a day. (Patient taking differently: Take 20 mg by mouth once daily.)     Family History       Problem Relation (Age of Onset)    Diabetes Mother, Father          Tobacco Use    Smoking status: Never    Smokeless tobacco: Never   Substance and Sexual Activity    Alcohol use: Not Currently    Drug use: No    Sexual activity: Not Currently     Partners: Male     Birth control/protection: I.U.D.     Review of Systems   Constitutional:  Negative for chills and fever.   HENT:  Negative for congestion and sore throat.    Eyes:  Positive for visual disturbance (decreased vision to left eye, blind in right).   Respiratory:  Positive for shortness of breath. Negative for cough.    Cardiovascular:  Negative for chest pain and palpitations.   Gastrointestinal:  Positive for abdominal pain, nausea and vomiting.   Endocrine: Negative for cold intolerance and heat intolerance.   Genitourinary:  Negative for dysuria and hematuria.   Musculoskeletal:  Negative for arthralgias and myalgias.   Skin:  Negative for pallor and rash.   Neurological:  Negative for tremors and seizures.   Hematological:  Negative for adenopathy. Does not bruise/bleed easily.   Psychiatric/Behavioral:  Negative for hallucinations.    All other systems reviewed and are negative.  Objective:     Vital Signs (Most Recent):  Temp: 98 °F (36.7 °C) (01/25/23 1442)  Pulse: 91 (01/25/23 2030)  Resp: 16 (01/25/23  2030)  BP: (!) 171/104 (01/25/23 2030)  SpO2: 95 % (01/25/23 2030)   Vital Signs (24h Range):  Temp:  [97.2 °F (36.2 °C)-98.6 °F (37 °C)] 98 °F (36.7 °C)  Pulse:  [] 91  Resp:  [16-20] 16  SpO2:  [95 %-100 %] 95 %  BP: (144-193)/() 171/104     Weight: 44 kg (97 lb)  Body mass index is 17.74 kg/m².    Physical Exam  Vitals and nursing note reviewed.   Constitutional:       General: She is awake. She is not in acute distress.     Appearance: Normal appearance. She is well-developed. She is ill-appearing.   HENT:      Head: Normocephalic and atraumatic.      Mouth/Throat:      Lips: Pink.      Mouth: Mucous membranes are moist.   Eyes:      Conjunctiva/sclera: Conjunctivae normal.      Pupils: Pupils are equal, round, and reactive to light.   Neck:      Thyroid: No thyromegaly.      Vascular: No JVD.   Cardiovascular:      Rate and Rhythm: Normal rate and regular rhythm.      Heart sounds: Normal heart sounds, S1 normal and S2 normal. No murmur heard.    No friction rub. No gallop.   Pulmonary:      Effort: Pulmonary effort is normal.      Breath sounds: Normal breath sounds.   Chest:       Abdominal:      General: Abdomen is flat. Bowel sounds are normal. There is no distension.      Palpations: Abdomen is soft. There is no mass.      Tenderness: There is generalized abdominal tenderness.   Musculoskeletal:         General: Normal range of motion.      Cervical back: Full passive range of motion without pain, normal range of motion and neck supple.   Skin:     General: Skin is warm and dry.      Capillary Refill: Capillary refill takes less than 2 seconds.   Neurological:      General: No focal deficit present.      Mental Status: She is alert and oriented to person, place, and time. Mental status is at baseline.      GCS: GCS eye subscore is 4. GCS verbal subscore is 5. GCS motor subscore is 6.      Cranial Nerves: No cranial nerve deficit.      Sensory: Sensation is intact.      Motor: Motor function is  intact.   Psychiatric:         Attention and Perception: Attention and perception normal.         Mood and Affect: Mood is anxious.         Behavior: Behavior normal. Behavior is cooperative.         CRANIAL NERVES     CN III, IV, VI   Pupils are equal, round, and reactive to light.     Significant Labs: All pertinent labs within the past 24 hours have been reviewed.  CBC:   Recent Labs   Lab 01/24/23  1209 01/25/23  0544 01/25/23  1600   WBC 7.96 6.72 8.82   HGB 10.3* 9.7* 11.3*   HCT 29.2* 27.1* 32.0*   PLT 50* 56* 58*     CMP:   Recent Labs   Lab 01/24/23  1209 01/25/23  0544 01/25/23  1600   * 131* 137   K 4.0 4.4 3.6   CL 91* 96 99   CO2 25 24 26   * 229* 123*   BUN 47* 30* 33*   CREATININE 5.2* 3.7* 4.4*   CALCIUM 8.3* 8.2* 9.3   PROT 5.9* 5.8* 7.0   ALBUMIN 2.7* 2.9* 3.2*   BILITOT 1.7* 1.3* 1.4*   ALKPHOS 119 124 160*   AST 15 18 18   ALT 12 10 10   ANIONGAP 12 11 12     Lipase:   Recent Labs   Lab 01/25/23  1600   LIPASE 12       POCT Glucose:   Recent Labs   Lab 01/25/23  1837 01/25/23  1918 01/25/23  2046   POCTGLUCOSE 48* 101 75     Troponin:   Recent Labs   Lab 01/23/23  2117 01/25/23  1600   TROPONINI  --  0.040*   TROPONINIHS 30.1*  --          Significant Imaging: I have reviewed all pertinent imaging results/findings within the past 24 hours.  Imaging Results              X-Ray Chest PA And Lateral (Final result)  Result time 01/25/23 17:01:16      Final result by Gianluca Arriaga Jr., MD (01/25/23 17:01:16)                   Impression:      Cardiomegaly in a biventricular pattern.  Double-lumen central line ends in the right atrium and ventricle.      Electronically signed by: Gianluca Arriaga MD  Date:    01/25/2023  Time:    17:01               Narrative:    EXAMINATION:  XR CHEST PA AND LATERAL    CLINICAL HISTORY:  Unspecified abdominal pain    TECHNIQUE:  PA and lateral views of the chest were performed.    COMPARISON:  Chest x-ray of January 23, 2023., CT chest of January 23,  2023.    FINDINGS:  There is cardiomegaly in a biventricular pattern.  A double lumen central line enters on the right and ends in the right and ventricle..  An intrapulmonary mass or infiltrate is not seen.  There is no pneumothorax or pleural effusion.                                       CT Renal Stone Study ABD Pelvis WO (Final result)  Result time 01/25/23 16:35:15      Final result by Raymundo Min MD (01/25/23 16:35:15)                   Impression:      1. No evidence of a definite acute abdominopelvic abnormality or significant interval detrimental change as compared to the prior study on 01/23/2023.  2. Persistent large pericardial effusion, similar to prior study.  3. New 7 mm ground-glass nodular opacity in the right lower lobe, nonspecific and may reflect small airways inflammation or infection.  4. Diffuse anasarca.      Electronically signed by: Raymundo Min  Date:    01/25/2023  Time:    16:35               Narrative:    EXAMINATION:  CT RENAL STONE STUDY ABD PELVIS WO    CLINICAL HISTORY:  Flank pain, kidney stone suspected;    TECHNIQUE:  Low dose axial images, sagittal and coronal reformations were obtained from the lung bases to the pubic symphysis.  Contrast was not administered.    COMPARISON:  CT chest, abdomen and pelvis 01/23/2023    FINDINGS:  Lack of intravenous contrast limits detection for parenchymal organ disease.    Heart: Partially imaged dialysis catheter entering the right ventricle, unchanged.  Persistent large pericardial effusion, similar to prior study.  Heart is mildly enlarged.    Lung Bases: Scattered bandlike densities at the left lung base again demonstrated possibly reflecting subsegmental atelectasis versus scarring.  New 7 mm ground-glass nodule in the posterior basal segment of the right lower lobe, nonspecific and may reflect small airways inflammation or infection.  No pleural effusion.    Liver: Mildly prominent, measuring 17 cm in size.  No focal hepatic  lesion.    Gallbladder: Surgically absent.    Bile Ducts: No evidence of dilated ducts.    Pancreas: No mass or peripancreatic fat stranding.    Spleen: Unremarkable.    Adrenals: Unremarkable.    Kidneys/ Ureters: No nephrolithiasis or hydronephrosis.  No obvious ureterolithiasis.    Bladder: No evidence of wall thickening.    Reproductive organs: Unremarkable.    GI Tract/Mesentery: Stomach and visualized lower esophagus are distended with ingested contents.  Moderate stool burden throughout the colon, which may reflect constipation.  No evidence of bowel obstruction.  No evidence of appendicitis.  Diffuse mesenteric edema, similar to prior study    Peritoneal Space: No ascites. No free air.    Retroperitoneum: No significant adenopathy.    Abdominal wall/soft tissue: Diffuse anasarca.    Vasculature: Scattered calcific atherosclerotic disease.  No evidence of abdominal aortic aneurysm.    Bones: No acute fracture.

## 2023-01-26 NOTE — NURSING
Awake and alert. Oriented x4. Follows simple commands. 20g to lt forearm with D10 infusing per pump. Site free of s/s of infiltration. Moaning with tactile stimuli. Returns to resting with closed eyes.

## 2023-01-26 NOTE — HPI
Tabby Howard is a 32 year old female with ESRD on HD, DM, HFpEF, GERD, suspected gastroparesis, sickle cell trait, HTN, who presented to the ED for evaluation of abdominal pain with nausea and vomiting. She reports ongoing abdominal pain for the past two days. She has been admitted 20 times for the same complaint in the past year, with her most recent hospitalization ending today, as she was discharged 20 minutes prior to arrival to this ED. She is a dialysis patient is receives dialysis on T/Th/Sat, with her most recent treatment done this Tuesday. She states she has been having nausea, vomiting, shortness of breath and worsening vision changes. 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 She denies all other complaint.     ED work up included a CBC which showed chronic anemia and thrombocytopenia. CMP showed chronic kidney disease around baseline. She experienced several episodes of hypoglycemia in the ED, and is refusing to eat snacks when provided. She did receive Levemir at [facility] this morning but denies taking other insulin today.   CT of the abdomen performed which showed no acute intraabdominal abnormalities. She received 2 amps of D 50 in the ED. Troponin chronically elevated at 0.040 and at baseline, and likely due to end-stage renal disease. EKG today without evidence of ischemia. Hospital Medicine consulted for admission and further management.

## 2023-01-26 NOTE — H&P
[facility]  [facility]  History & Physical    Patient Name: Tabby Howard  MRN: [id number]  Patient Class: OP- Observation  Admission Date: 1/25/2023  Attending Physician: Raymundo Parker MD   Primary Care Provider: [facility]         Patient information was obtained from patient, past medical records and ER records.     Subjective:     Principal Problem:Hypoglycemia    Chief Complaint:   Chief Complaint   Patient presents with    Abdominal Pain     Abd pain for past 2 days, last dialysis was last week, pt is suppose to have dialysis Tuesday, Thursday and Saturday.     Back Pain        HPI: Tabby Howard is a 32 year old female with ESRD on HD, DM, HFpEF, GERD, suspected gastroparesis, sickle cell trait, HTN, who presented to the ED for evaluation of abdominal pain with nausea and vomiting. She reports ongoing abdominal pain for the past two days. She has been admitted 20 times for the same complaint in the past year, with her most recent hospitalization ending today, as she was discharged 20 minutes prior to arrival to this ED. She is a dialysis patient is receives dialysis on T/Th/Sat, with her most recent treatment done this Tuesday. She states she has been having nausea, vomiting, shortness of breath and worsening vision changes. 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 She denies all other complaint.     ED work up included a CBC which showed chronic anemia and thrombocytopenia. CMP showed chronic kidney disease around baseline. She experienced several episodes of hypoglycemia in the ED, and is refusing to eat snacks when provided. She did receive Levemir at [facility] this morning but denies taking other insulin today.   CT of the abdomen performed which showed no acute intraabdominal abnormalities. She received 2 amps of D 50 in the ED. Troponin chronically elevated at 0.040 and at baseline,  and likely due to end-stage renal disease. EKG today without evidence of ischemia. Hospital Medicine consulted for admission and further management.       Past Medical History:   Diagnosis Date    CKD (chronic kidney disease), stage IV 2022    Diabetes mellitus due to underlying condition with unspecified complications 2022    Gastroparesis 2022    Heart failure with preserved ejection fraction 2022    EF 55% on 3/22    History of gastroesophageal reflux (GERD)     History of supraventricular tachycardia     Hyperkalemia 2022    Hypertensive emergency 2022    Sickle cell trait 2022    Type 2 diabetes mellitus        Past Surgical History:   Procedure Laterality Date     SECTION      x 3    COLONOSCOPY      COLONOSCOPY N/A 2022    Procedure: COLONOSCOPY;  Surgeon: Jagdeep Cedeno MD;  Location: [facility];  Service: Endoscopy;  Laterality: N/A;    ESOPHAGOGASTRODUODENOSCOPY N/A 10/18/2019    Procedure: ESOPHAGOGASTRODUODENOSCOPY (EGD);  Surgeon: Gianluca Mendez MD;  Location: [facility];  Service: Endoscopy;  Laterality: N/A;    ESOPHAGOGASTRODUODENOSCOPY N/A 2022    Procedure: EGD (ESOPHAGOGASTRODUODENOSCOPY);  Surgeon: Micky Paredes III, MD;  Location: [facility];  Service: Endoscopy;  Laterality: N/A;    ESOPHAGOGASTRODUODENOSCOPY N/A 2022    Procedure: EGD (ESOPHAGOGASTRODUODENOSCOPY);  Surgeon: Marcelo Zhong MD;  Location: [facility];  Service: Endoscopy;  Laterality: N/A;    LAPAROSCOPIC CHOLECYSTECTOMY N/A 2022    Procedure: CHOLECYSTECTOMY, LAPAROSCOPIC;  Surgeon: Grey Perez MD;  Location[facility];  Service: General;  Laterality: N/A;    PLACEMENT OF DUAL-LUMEN VASCULAR CATHETER Left 2022    Procedure: INSERTION-CATHETER-JOSEPH;  Surgeon: Dionte Gan MD;  Location: [facility] OR;  Service: General;  Laterality: Left;    PLACEMENT OF DUAL-LUMEN VASCULAR CATHETER Right 2022    Procedure:  INSERTION-CATHETER-Hemosplit;  Surgeon: Dionte Gan MD;  Location: [facility];  Service: General;  Laterality: Right;       Review of patient's allergies indicates:   Allergen Reactions    Penicillins Hives       Current Facility-Administered Medications on File Prior to Encounter   Medication    [COMPLETED] acetaminophen 1,000 mg/100 mL (10 mg/mL) injection 1,000 mg    [COMPLETED] hyoscyamine injection 0.25 mg    [COMPLETED] oxyCODONE immediate release tablet 10 mg    [DISCONTINUED] 0.9%  NaCl infusion (for blood administration)    [DISCONTINUED] amLODIPine tablet 10 mg    [DISCONTINUED] carvediloL tablet 25 mg    [DISCONTINUED] cloNIDine 0.3 mg/24 hr td ptwk 1 patch    [DISCONTINUED] cloNIDine tablet 0.1 mg    [DISCONTINUED] dextrose 10% bolus 125 mL 125 mL    [DISCONTINUED] dextrose 10% bolus 250 mL 250 mL    [DISCONTINUED] epoetin teresa-epbx injection 10,000 Units    [DISCONTINUED] ferrous sulfate tablet 1 each    [DISCONTINUED] FLUoxetine capsule 20 mg    [DISCONTINUED] furosemide tablet 40 mg    [DISCONTINUED] glucagon (human recombinant) injection 1 mg    [DISCONTINUED] glucagon (human recombinant) injection 1 mg    [DISCONTINUED] glucose chewable tablet 16 g    [DISCONTINUED] glucose chewable tablet 24 g    [DISCONTINUED] hydrALAZINE tablet 100 mg    [DISCONTINUED] hydroCHLOROthiazide tablet 12.5 mg    [DISCONTINUED] insulin aspart U-100 pen 1-10 Units    [DISCONTINUED] insulin detemir U-100 pen 28 Units    [DISCONTINUED] isosorbide mononitrate 24 hr tablet 120 mg    [DISCONTINUED] lactulose 20 gram/30 mL solution Soln 20 g    [DISCONTINUED] levETIRAcetam tablet 500 mg    [DISCONTINUED] losartan tablet 100 mg    [DISCONTINUED] mirtazapine tablet 15 mg    [DISCONTINUED] ondansetron disintegrating tablet 4 mg    [DISCONTINUED] pantoprazole EC tablet 40 mg     Current Outpatient Medications on File Prior to Encounter   Medication Sig    albuterol (PROVENTIL/VENTOLIN HFA) 90  "mcg/actuation inhaler Inhale 2 puffs into the lungs every 6 (six) hours as needed for Wheezing. Rescue    amLODIPine (NORVASC) 10 MG tablet Take 1 tablet (10 mg total) by mouth once daily.    carvediloL (COREG) 25 MG tablet Take 1 tablet (25 mg total) by mouth 2 (two) times daily.    cloNIDine 0.2 mg/24 hr td ptwk (CATAPRES) 0.2 mg/24 hr Place 1 patch onto the skin every 7 days.    ferrous sulfate 325 (65 FE) MG EC tablet Take 325 mg by mouth once daily.    FLUoxetine 20 MG capsule Take 1 capsule (20 mg total) by mouth once daily.    furosemide (LASIX) 40 MG tablet Take 40 mg by mouth 2 (two) times a day.    gabapentin (NEURONTIN) 100 MG capsule Take 1 capsule (100 mg total) by mouth 2 (two) times daily.    hydrALAZINE (APRESOLINE) 100 MG tablet Take 1 tablet (100 mg total) by mouth every 8 (eight) hours.    insulin (LANTUS SOLOSTAR U-100 INSULIN) glargine 100 units/mL SubQ pen Inject 16 Units into the skin every evening.    insulin aspart U-100 (NOVOLOG) 100 unit/mL (3 mL) InPn pen Inject 1-10 Units into the skin before meals and at bedtime as needed (Hyperglycemia). Max daily dose 40 units.    isosorbide mononitrate (IMDUR) 60 MG 24 hr tablet Take 2 tablets (120 mg total) by mouth once daily.    levETIRAcetam (KEPPRA) 500 MG Tab Take 1 tablet (500 mg total) by mouth 2 (two) times daily.    losartan-hydrochlorothiazide 100-12.5 mg (HYZAAR) 100-12.5 mg Tab Take 1 tablet by mouth once daily.    mirtazapine (REMERON SOL-TAB) 15 MG disintegrating tablet Take 1 tablet (15 mg total) by mouth nightly.    ondansetron (ZOFRAN) 4 MG tablet Take 1 tablet (4 mg total) by mouth every 8 (eight) hours as needed for Nausea.    pantoprazole (PROTONIX) 40 MG tablet Take 40 mg by mouth once daily.    pen needle, diabetic (BD ULTRA-FINE MAGALIE PEN NEEDLE) 32 gauge x 5/32" Ndle Inject into the skin 5 times per day with insulin    [DISCONTINUED] atenoloL (TENORMIN) 50 MG tablet Take 1 tablet (50 mg total) by mouth every " other day.    [DISCONTINUED] fluconazole (DIFLUCAN) 150 MG Tab Take 150 mg by mouth once daily.    [DISCONTINUED] HYDROmorphone (DILAUDID) 4 MG tablet Take 1 tablet (4 mg total) by mouth every 12 (twelve) hours as needed for Pain.    [DISCONTINUED] insulin detemir U-100 (LEVEMIR FLEXTOUCH) 100 unit/mL (3 mL) SubQ InPn pen Inject 9 Units into the skin once daily.    [DISCONTINUED] insulin glargine 100 units/mL SubQ pen Inject 28 Units into the skin once daily.    [DISCONTINUED] insulin lispro 100 unit/mL pen Inject 8 Units into the skin 3 (three) times daily with meals.    [DISCONTINUED] omeprazole (PRILOSEC) 20 MG capsule Take 2 capsules (40 mg total) by mouth once daily. for 10 days    [DISCONTINUED] torsemide (DEMADEX) 20 MG Tab Take 1 tablet (20 mg total) by mouth 2 (two) times a day. (Patient taking differently: Take 20 mg by mouth once daily.)     Family History       Problem Relation (Age of Onset)    Diabetes Mother, Father          Tobacco Use    Smoking status: Never    Smokeless tobacco: Never   Substance and Sexual Activity    Alcohol use: Not Currently    Drug use: No    Sexual activity: Not Currently     Partners: Male     Birth control/protection: I.U.D.     Review of Systems   Constitutional:  Negative for chills and fever.   HENT:  Negative for congestion and sore throat.    Eyes:  Positive for visual disturbance (decreased vision to left eye, blind in right).   Respiratory:  Positive for shortness of breath. Negative for cough.    Cardiovascular:  Negative for chest pain and palpitations.   Gastrointestinal:  Positive for abdominal pain, nausea and vomiting.   Endocrine: Negative for cold intolerance and heat intolerance.   Genitourinary:  Negative for dysuria and hematuria.   Musculoskeletal:  Negative for arthralgias and myalgias.   Skin:  Negative for pallor and rash.   Neurological:  Negative for tremors and seizures.   Hematological:  Negative for adenopathy. Does not bruise/bleed  easily.   Psychiatric/Behavioral:  Negative for hallucinations.    All other systems reviewed and are negative.  Objective:     Vital Signs (Most Recent):  Temp: 98 °F (36.7 °C) (01/25/23 1442)  Pulse: 91 (01/25/23 2030)  Resp: 16 (01/25/23 2030)  BP: (!) 171/104 (01/25/23 2030)  SpO2: 95 % (01/25/23 2030)   Vital Signs (24h Range):  Temp:  [97.2 °F (36.2 °C)-98.6 °F (37 °C)] 98 °F (36.7 °C)  Pulse:  [] 91  Resp:  [16-20] 16  SpO2:  [95 %-100 %] 95 %  BP: (144-193)/() 171/104     Weight: 44 kg (97 lb)  Body mass index is 17.74 kg/m².    Physical Exam  Vitals and nursing note reviewed.   Constitutional:       General: She is awake. She is not in acute distress.     Appearance: Normal appearance. She is well-developed. She is ill-appearing.   HENT:      Head: Normocephalic and atraumatic.      Mouth/Throat:      Lips: Pink.      Mouth: Mucous membranes are moist.   Eyes:      Conjunctiva/sclera: Conjunctivae normal.      Pupils: Pupils are equal, round, and reactive to light.   Neck:      Thyroid: No thyromegaly.      Vascular: No JVD.   Cardiovascular:      Rate and Rhythm: Normal rate and regular rhythm.      Heart sounds: Normal heart sounds, S1 normal and S2 normal. No murmur heard.    No friction rub. No gallop.   Pulmonary:      Effort: Pulmonary effort is normal.      Breath sounds: Normal breath sounds.   Chest:       Abdominal:      General: Abdomen is flat. Bowel sounds are normal. There is no distension.      Palpations: Abdomen is soft. There is no mass.      Tenderness: There is generalized abdominal tenderness.   Musculoskeletal:         General: Normal range of motion.      Cervical back: Full passive range of motion without pain, normal range of motion and neck supple.   Skin:     General: Skin is warm and dry.      Capillary Refill: Capillary refill takes less than 2 seconds.   Neurological:      General: No focal deficit present.      Mental Status: She is alert and oriented to person,  place, and time. Mental status is at baseline.      GCS: GCS eye subscore is 4. GCS verbal subscore is 5. GCS motor subscore is 6.      Cranial Nerves: No cranial nerve deficit.      Sensory: Sensation is intact.      Motor: Motor function is intact.   Psychiatric:         Attention and Perception: Attention and perception normal.         Mood and Affect: Mood is anxious.         Behavior: Behavior normal. Behavior is cooperative.         CRANIAL NERVES     CN III, IV, VI   Pupils are equal, round, and reactive to light.     Significant Labs: All pertinent labs within the past 24 hours have been reviewed.  CBC:   Recent Labs   Lab 01/24/23  1209 01/25/23  0544 01/25/23  1600   WBC 7.96 6.72 8.82   HGB 10.3* 9.7* 11.3*   HCT 29.2* 27.1* 32.0*   PLT 50* 56* 58*     CMP:   Recent Labs   Lab 01/24/23  1209 01/25/23  0544 01/25/23  1600   * 131* 137   K 4.0 4.4 3.6   CL 91* 96 99   CO2 25 24 26   * 229* 123*   BUN 47* 30* 33*   CREATININE 5.2* 3.7* 4.4*   CALCIUM 8.3* 8.2* 9.3   PROT 5.9* 5.8* 7.0   ALBUMIN 2.7* 2.9* 3.2*   BILITOT 1.7* 1.3* 1.4*   ALKPHOS 119 124 160*   AST 15 18 18   ALT 12 10 10   ANIONGAP 12 11 12     Lipase:   Recent Labs   Lab 01/25/23  1600   LIPASE 12       POCT Glucose:   Recent Labs   Lab 01/25/23  1837 01/25/23  1918 01/25/23  2046   POCTGLUCOSE 48* 101 75     Troponin:   Recent Labs   Lab 01/23/23  2117 01/25/23  1600   TROPONINI  --  0.040*   TROPONINIHS 30.1*  --          Significant Imaging: I have reviewed all pertinent imaging results/findings within the past 24 hours.  Imaging Results              X-Ray Chest PA And Lateral (Final result)  Result time 01/25/23 17:01:16      Final result by Gianluca Arriaga Jr., MD (01/25/23 17:01:16)                   Impression:      Cardiomegaly in a biventricular pattern.  Double-lumen central line ends in the right atrium and ventricle.      Electronically signed by: Gianluca Arriaga MD  Date:    01/25/2023  Time:    17:01                Narrative:    EXAMINATION:  XR CHEST PA AND LATERAL    CLINICAL HISTORY:  Unspecified abdominal pain    TECHNIQUE:  PA and lateral views of the chest were performed.    COMPARISON:  Chest x-ray of January 23, 2023., CT chest of January 23, 2023.    FINDINGS:  There is cardiomegaly in a biventricular pattern.  A double lumen central line enters on the right and ends in the right and ventricle..  An intrapulmonary mass or infiltrate is not seen.  There is no pneumothorax or pleural effusion.                                       CT Renal Stone Study ABD Pelvis WO (Final result)  Result time 01/25/23 16:35:15      Final result by Raymundo Min MD (01/25/23 16:35:15)                   Impression:      1. No evidence of a definite acute abdominopelvic abnormality or significant interval detrimental change as compared to the prior study on 01/23/2023.  2. Persistent large pericardial effusion, similar to prior study.  3. New 7 mm ground-glass nodular opacity in the right lower lobe, nonspecific and may reflect small airways inflammation or infection.  4. Diffuse anasarca.      Electronically signed by: Raymundo Min  Date:    01/25/2023  Time:    16:35               Narrative:    EXAMINATION:  CT RENAL STONE STUDY ABD PELVIS WO    CLINICAL HISTORY:  Flank pain, kidney stone suspected;    TECHNIQUE:  Low dose axial images, sagittal and coronal reformations were obtained from the lung bases to the pubic symphysis.  Contrast was not administered.    COMPARISON:  CT chest, abdomen and pelvis 01/23/2023    FINDINGS:  Lack of intravenous contrast limits detection for parenchymal organ disease.    Heart: Partially imaged dialysis catheter entering the right ventricle, unchanged.  Persistent large pericardial effusion, similar to prior study.  Heart is mildly enlarged.    Lung Bases: Scattered bandlike densities at the left lung base again demonstrated possibly reflecting subsegmental atelectasis versus scarring.  New 7 mm  ground-glass nodule in the posterior basal segment of the right lower lobe, nonspecific and may reflect small airways inflammation or infection.  No pleural effusion.    Liver: Mildly prominent, measuring 17 cm in size.  No focal hepatic lesion.    Gallbladder: Surgically absent.    Bile Ducts: No evidence of dilated ducts.    Pancreas: No mass or peripancreatic fat stranding.    Spleen: Unremarkable.    Adrenals: Unremarkable.    Kidneys/ Ureters: No nephrolithiasis or hydronephrosis.  No obvious ureterolithiasis.    Bladder: No evidence of wall thickening.    Reproductive organs: Unremarkable.    GI Tract/Mesentery: Stomach and visualized lower esophagus are distended with ingested contents.  Moderate stool burden throughout the colon, which may reflect constipation.  No evidence of bowel obstruction.  No evidence of appendicitis.  Diffuse mesenteric edema, similar to prior study    Peritoneal Space: No ascites. No free air.    Retroperitoneum: No significant adenopathy.    Abdominal wall/soft tissue: Diffuse anasarca.    Vasculature: Scattered calcific atherosclerotic disease.  No evidence of abdominal aortic aneurysm.    Bones: No acute fracture.                                        Assessment/Plan:     * Hypoglycemia  Patient's FSGs are uncontrolled due to hypoglycemia on current medication regimen.  Last A1c reviewed-   Lab Results   Component Value Date    HGBA1C 7.5 (H) 12/23/2022     Most recent fingerstick glucose reviewed-   Recent Labs   Lab 01/25/23  1835 01/25/23  1837 01/25/23  1918 01/25/23  2046   POCTGLUCOSE 42* 48* 101 75     Current correctional scale  Medium  Maintain anti-hyperglycemic dose as follows-   Antihyperglycemics (From admission, onward)    Start     Stop Route Frequency Ordered    01/25/23 2034  insulin aspart U-100 pen 1-10 Units         -- SubQ Before meals & nightly PRN 01/25/23 1936        Hold Oral hypoglycemics while patient is in the hospital.    D10 infusion at 25cc/hr for  persistent hypoglycemia.         Intractable nausea and vomiting    Admit to obs  The patient has had multiple admissions for similar complaints, with notes regarding pain med seeking behavior, where she states she only likes Dilaudid IV. Of note, she was discharged from [facility] today and arrived to [facility] 20 minutes later, complaining of intractable abdominal pain and nausea.     Zofran, Compazine for nausea PRN, watch Qtc  Avoid narcotics if possible, try gabapentin     Advance diet as tolerated          Drug-seeking behavior  Noted, ongoing issue  Patient is currently on 21st admission this year for same complaints with repeated requests for dilaudid specifically.     Anemia of chronic kidney failure, stage 5    Patient's anemia is currently controlled. Has not received any PRBCs to date.. Etiology likely d/t chronic kidney disease  Current CBC reviewed-   Lab Results   Component Value Date    HGB 11.3 (L) 01/25/2023    HCT 32.0 (L) 01/25/2023     Monitor serial CBC and transfuse if patient becomes hemodynamically unstable, symptomatic or H/H drops below 7/21.       Thrombocytopenia    Platelets on admit 58  Monitor CBC    Malnutrition of moderate degree  Nutrition consulted. Most recent weight and BMI monitored-                         Measurements:  Wt Readings from Last 1 Encounters:   01/25/23 44 kg (97 lb)   Body mass index is 17.74 kg/m².    Recommendations:      Patient has been screened and assessed by RD. RD will follow patient.      Hypertension    Chronic, uncontrolled.  Latest blood pressure and vitals reviewed-   Temp:  [97.2 °F (36.2 °C)-98.6 °F (37 °C)]   Pulse:  []   Resp:  [16-20]   BP: (144-193)/()   SpO2:  [95 %-100 %] .   Home meds for hypertension were reviewed and noted below.   Hypertension Medications             amLODIPine (NORVASC) 10 MG tablet Take 1 tablet (10 mg total) by mouth once daily.    carvediloL (COREG) 25 MG tablet Take 1 tablet (25 mg total) by mouth 2 (two) times  daily.    cloNIDine 0.2 mg/24 hr td ptwk (CATAPRES) 0.2 mg/24 hr Place 1 patch onto the skin every 7 days.    furosemide (LASIX) 40 MG tablet Take 40 mg by mouth 2 (two) times a day.    hydrALAZINE (APRESOLINE) 100 MG tablet Take 1 tablet (100 mg total) by mouth every 8 (eight) hours.    isosorbide mononitrate (IMDUR) 60 MG 24 hr tablet Take 2 tablets (120 mg total) by mouth once daily.    losartan-hydrochlorothiazide 100-12.5 mg (HYZAAR) 100-12.5 mg Tab Take 1 tablet by mouth once daily.          While in the hospital, will manage blood pressure as follows; Continue home antihypertensive regimen    Will utilize p.r.n. blood pressure medication only if patient's blood pressure greater than  180/110 and she develops symptoms such as worsening chest pain or shortness of breath.      ESRD (end stage renal disease) on dialysis    Creatine stable for now. BMP reviewed- noted Estimated Creatinine Clearance: 12.5 mL/min (A) (based on SCr of 4.4 mg/dL (H)). according to latest data. Monitor UOP and serial BMP and adjust therapy as needed. Renally dose meds.      Dialysis T/Th/Sa  Last treatment Tuesday (yesterday)  Consult nephrology for orders    Pericardial effusion  Noted, chronic, no signs of tamponade    Seizure disorder  Continue keppra  Seizure and fall precautions    Gastroparesis - suspected, unconfirmed  Noted, chronic  Unable to give reglan due to seizure history  Will treat with other antiemetics    Gastritis  Noted, chronic  protonix      VTE Risk Mitigation (From admission, onward)         Ordered     Reason for No Pharmacological VTE Prophylaxis  Once        Question:  Reasons:  Answer:  Thrombocytopenia    01/25/23 1936     IP VTE HIGH RISK PATIENT  Once         01/25/23 1936     Place sequential compression device  Until discontinued         01/25/23 1936                   JACQUELINE Arroyo  Department of Hospital Medicine   [facility] - Emergency Dept

## 2023-01-26 NOTE — ED NOTES
Patient assisted to bedside commode. She reports that she feels like she has to urinate but she is unable to do so. A straight cath will be done.

## 2023-01-26 NOTE — ASSESSMENT & PLAN NOTE
Nutrition consulted. Most recent weight and BMI monitored-                         Measurements:  Wt Readings from Last 1 Encounters:   01/25/23 44 kg (97 lb)   Body mass index is 17.74 kg/m².    Recommendations:      Patient has been screened and assessed by RD. RD will follow patient.

## 2023-01-26 NOTE — CONSULTS
"INPATIENT NEPHROLOGY CONSULT   [facility]    Patient Name: Tabby Howard  Date: 01/26/2023    Reason for consultation: ESRD    Chief Complaint:   Chief Complaint   Patient presents with    Abdominal Pain     Abd pain for past 2 days, last dialysis was last week, pt is suppose to have dialysis Tuesday, Thursday and Saturday.     Back Pain       History of Present Illness:  Tabby Howard is a 32 year old female with ESRD on HD, DM, HFpEF, GERD, gastroparesis, sickle cell trait, HTN, who presented to the ED for evaluation of abdominal pain with nausea and vomiting. She reports ongoing abdominal pain for the past two days, no exac/reliev factors. She has been admitted 20 times for the same complaint in the past year, with her most recent hospitalization ending today, as she was discharged from [facility] 20 minutes prior to arrival to this ED. She is a dialysis patient is receives dialysis on T/Th/Sat, with her most recent treatment done this Tuesday. We are consulted for dialysis. Per [facility] discharge summary: "She should not get admitted with chronic abdominal pain because she clearly has drug seeking behavior- has had 13 CT CAP since October 2022 which have all been negative. Did not receive dilaudid at [facility] this last admission."    Interval History:  1/26- seen on HD    Plan of Care:    Assessment:  ESRD on HD TTS  HTN  SHPT  Anemia of CKD  Chronic abdominal pain    Plan:    - ordered HD TTS  - resume home BP meds- ordered 2-4L UF-  - renal diet, 1.5L fluid restriction  - ordered SHELLEY with HD  - needs to be established with pain management- may need to reside in a NH for overall care- hospitalizations is not the way to go    Thank you for allowing us to participate in this patient's care. We will continue to follow.    Vital Signs:  Temp Readings from Last 3 Encounters:   01/26/23 97.1 °F (36.2 °C) (Axillary)   01/25/23 97.2 °F (36.2 °C) (Oral)   01/13/23 98.9 °F (37.2 °C)       Pulse Readings from Last 3 " Encounters:   23 91   23 95   23 87       BP Readings from Last 3 Encounters:   23 (!) 158/103   23 (!) 191/128   23 133/82       Weight:  Wt Readings from Last 3 Encounters:   23 43.9 kg (96 lb 12.5 oz)   23 44.2 kg (97 lb 7.1 oz)   23 52.8 kg (116 lb 6.5 oz)       Past Medical & Surgical History:  Past Medical History:   Diagnosis Date    CKD (chronic kidney disease), stage IV 2022    Diabetes mellitus due to underlying condition with unspecified complications 2022    Gastroparesis 2022    Heart failure with preserved ejection fraction 2022    EF 55% on 3/22    History of gastroesophageal reflux (GERD)     History of supraventricular tachycardia     Hyperkalemia 2022    Hypertensive emergency 2022    Sickle cell trait 2022    Type 2 diabetes mellitus        Past Surgical History:   Procedure Laterality Date     SECTION      x 3    COLONOSCOPY      COLONOSCOPY N/A 2022    Procedure: COLONOSCOPY;  Surgeon: Jagdeep Cedeno MD;  Location: [facility];  Service: Endoscopy;  Laterality: N/A;    ESOPHAGOGASTRODUODENOSCOPY N/A 10/18/2019    Procedure: ESOPHAGOGASTRODUODENOSCOPY (EGD);  Surgeon: Gianluca Mendez MD;  Location: [facility];  Service: Endoscopy;  Laterality: N/A;    ESOPHAGOGASTRODUODENOSCOPY N/A 2022    Procedure: EGD (ESOPHAGOGASTRODUODENOSCOPY);  Surgeon: Micky Paredes III, MD;  Location: [facility];  Service: Endoscopy;  Laterality: N/A;    ESOPHAGOGASTRODUODENOSCOPY N/A 2022    Procedure: EGD (ESOPHAGOGASTRODUODENOSCOPY);  Surgeon: Marcelo Zhong MD;  Location: [facility];  Service: Endoscopy;  Laterality: N/A;    LAPAROSCOPIC CHOLECYSTECTOMY N/A 2022    Procedure: CHOLECYSTECTOMY, LAPAROSCOPIC;  Surgeon: Grey Perez MD;  Location: [facility];  Service: General;  Laterality: N/A;    PLACEMENT OF DUAL-LUMEN VASCULAR CATHETER Left 2022    Procedure: INSERTION-CATHETER-JOSEPH;   Surgeon: Dionte Gan MD;  Location: [facility];  Service: General;  Laterality: Left;    PLACEMENT OF DUAL-LUMEN VASCULAR CATHETER Right 07/26/2022    Procedure: INSERTION-CATHETER-Hemosplit;  Surgeon: Dionte Gan MD;  Location: [facility] OR;  Service: General;  Laterality: Right;       Past Social History:  Social History     Socioeconomic History    Marital status:    Tobacco Use    Smoking status: Never    Smokeless tobacco: Never   Substance and Sexual Activity    Alcohol use: Not Currently    Drug use: No    Sexual activity: Not Currently     Partners: Male     Birth control/protection: I.U.D.     Social Determinants of Health     Financial Resource Strain: Unknown    Difficulty of Paying Living Expenses: Patient refused   Food Insecurity: Unknown    Worried About Running Out of Food in the Last Year: Patient refused    Ran Out of Food in the Last Year: Patient refused   Transportation Needs: Unmet Transportation Needs    Lack of Transportation (Medical): Yes    Lack of Transportation (Non-Medical): Yes   Physical Activity: Inactive    Days of Exercise per Week: 0 days    Minutes of Exercise per Session: 0 min   Stress: Unknown    Feeling of Stress : Patient refused   Social Connections: Unknown    Frequency of Communication with Friends and Family: More than three times a week    Frequency of Social Gatherings with Friends and Family: More than three times a week    Attends [employer] Services: Patient refused    Active Member of Clubs or Organizations: Patient refused    Attends Club or Organization Meetings: Patient refused    Marital Status: Patient refused   Housing Stability: High Risk    Unable to Pay for Housing in the Last Year: Patient refused    Unstable Housing in the Last Year: Yes       Medications:  Scheduled Meds:   aluminum-magnesium hydroxide-simethicone  30 mL Oral Once    amLODIPine  10 mg Oral Daily    carvediloL  25 mg Oral BID    [START ON 1/27/2023] ferrous gluconate  324 mg Oral  Q48H    FLUoxetine  20 mg Oral Daily    furosemide  40 mg Oral BID    gabapentin  100 mg Oral BID    hydrALAZINE  100 mg Oral Q8H    hydroCHLOROthiazide  12.5 mg Oral Daily    isosorbide mononitrate  120 mg Oral Daily    levETIRAcetam  500 mg Oral BID    losartan  100 mg Oral Daily    mirtazapine  15 mg Oral QHS    pantoprazole  40 mg Oral Daily    senna-docusate 8.6-50 mg  1 tablet Oral BID     Continuous Infusions:  PRN Meds:.acetaminophen, dextrose 10%, dextrose 10%, glucagon (human recombinant), glucose, glucose, insulin aspart U-100, ondansetron, prochlorperazine, promethazine (PHENERGAN) IVPB, promethazine, sodium chloride 0.9%  Current Facility-Administered Medications on File Prior to Encounter   Medication Dose Route Frequency Provider Last Rate Last Admin    [DISCONTINUED] 0.9%  NaCl infusion (for blood administration)   Intravenous Q24H PRN REINA Magana        [DISCONTINUED] amLODIPine tablet 10 mg  10 mg Oral Daily REINA Magana   10 mg at 01/25/23 0952    [DISCONTINUED] carvediloL tablet 25 mg  25 mg Oral BID REINA Magana   25 mg at 01/25/23 0953    [DISCONTINUED] cloNIDine 0.3 mg/24 hr td ptwk 1 patch  1 patch Transdermal Q7 Days Roby Neves MD   1 patch at 01/24/23 1205    [DISCONTINUED] cloNIDine tablet 0.1 mg  0.1 mg Oral Q8H PRN Roby Neves MD   0.1 mg at 01/24/23 0628    [DISCONTINUED] dextrose 10% bolus 125 mL 125 mL  125 mL Intravenous PRN Alfonso Garsia MD        [DISCONTINUED] dextrose 10% bolus 250 mL 250 mL  250 mL Intravenous PRN Alfonso Garsia MD        [DISCONTINUED] epoetin teresa-epbx injection 10,000 Units  10,000 Units Subcutaneous Every Tues, Thurs, Sat Hugh Figueroa MD   10,000 Units at 01/24/23 1305    [DISCONTINUED] ferrous sulfate tablet 1 each  1 tablet Oral Daily REINA Magana   1 each at 01/25/23 0953    [DISCONTINUED] FLUoxetine capsule 20 mg  20 mg Oral Daily Deven Hackett APRN   20 mg at 01/25/23 0952     [DISCONTINUED] furosemide tablet 40 mg  40 mg Oral BID REINA Magana   40 mg at 01/25/23 0953    [DISCONTINUED] glucagon (human recombinant) injection 1 mg  1 mg Intramuscular PRN REINA Magana        [DISCONTINUED] glucagon (human recombinant) injection 1 mg  1 mg Intramuscular KARI Garsia MD        [DISCONTINUED] glucose chewable tablet 16 g  16 g Oral PRWILIAM Garsia MD   16 g at 01/24/23 1738    [DISCONTINUED] glucose chewable tablet 24 g  24 g Oral PRWILIAM Garsia MD        [DISCONTINUED] hydrALAZINE tablet 100 mg  100 mg Oral Q8H REINA Magana   100 mg at 01/25/23 0521    [DISCONTINUED] hydroCHLOROthiazide tablet 12.5 mg  12.5 mg Oral Daily REINA Magana   12.5 mg at 01/25/23 0952    [DISCONTINUED] insulin aspart U-100 pen 1-10 Units  1-10 Units Subcutaneous QID (AC + HS) KARI Garsia MD   10 Units at 01/24/23 1125    [DISCONTINUED] insulin detemir U-100 pen 28 Units  28 Units Subcutaneous Daily REINA Magana   28 Units at 01/25/23 0952    [DISCONTINUED] isosorbide mononitrate 24 hr tablet 120 mg  120 mg Oral Daily REINA Magana   120 mg at 01/25/23 0952    [DISCONTINUED] lactulose 20 gram/30 mL solution Soln 20 g  20 g Oral TID Alfonso Garsia MD   20 g at 01/25/23 0952    [DISCONTINUED] levETIRAcetam tablet 500 mg  500 mg Oral BID REINA Magana   500 mg at 01/25/23 0952    [DISCONTINUED] losartan tablet 100 mg  100 mg Oral Daily REINA Magana   100 mg at 01/25/23 0952    [DISCONTINUED] mirtazapine tablet 15 mg  15 mg Oral QHS REINA Magana   15 mg at 01/24/23 2015    [DISCONTINUED] ondansetron disintegrating tablet 4 mg  4 mg Oral Q6H PRN REINA Magana        [DISCONTINUED] pantoprazole EC tablet 40 mg  40 mg Oral Before breakfast REINA Magana   40 mg at 01/25/23 0522     Current Outpatient Medications on File Prior to Encounter   Medication Sig Dispense Refill    albuterol  (PROVENTIL/VENTOLIN HFA) 90 mcg/actuation inhaler Inhale 2 puffs into the lungs every 6 (six) hours as needed for Wheezing. Rescue 18 g 3    amLODIPine (NORVASC) 10 MG tablet Take 1 tablet (10 mg total) by mouth once daily. 30 tablet 11    carvediloL (COREG) 25 MG tablet Take 1 tablet (25 mg total) by mouth 2 (two) times daily. 60 tablet 2    cloNIDine 0.2 mg/24 hr td ptwk (CATAPRES) 0.2 mg/24 hr Place 1 patch onto the skin every 7 days. 4 patch 2    ferrous sulfate 325 (65 FE) MG EC tablet Take 325 mg by mouth once daily.      FLUoxetine 20 MG capsule Take 1 capsule (20 mg total) by mouth once daily. 30 capsule 11    furosemide (LASIX) 40 MG tablet Take 40 mg by mouth 2 (two) times a day.      gabapentin (NEURONTIN) 100 MG capsule Take 1 capsule (100 mg total) by mouth 2 (two) times daily. 90 capsule 2    hydrALAZINE (APRESOLINE) 100 MG tablet Take 1 tablet (100 mg total) by mouth every 8 (eight) hours. 90 tablet 2    insulin (LANTUS SOLOSTAR U-100 INSULIN) glargine 100 units/mL SubQ pen Inject 16 Units into the skin every evening.      insulin aspart U-100 (NOVOLOG) 100 unit/mL (3 mL) InPn pen Inject 1-10 Units into the skin before meals and at bedtime as needed (Hyperglycemia). Max daily dose 40 units. 3 mL 6    isosorbide mononitrate (IMDUR) 60 MG 24 hr tablet Take 2 tablets (120 mg total) by mouth once daily. 30 tablet 11    levETIRAcetam (KEPPRA) 500 MG Tab Take 1 tablet (500 mg total) by mouth 2 (two) times daily. 60 tablet 11    losartan-hydrochlorothiazide 100-12.5 mg (HYZAAR) 100-12.5 mg Tab Take 1 tablet by mouth once daily.      mirtazapine (REMERON SOL-TAB) 15 MG disintegrating tablet Take 1 tablet (15 mg total) by mouth nightly. 90 tablet 0    ondansetron (ZOFRAN) 4 MG tablet Take 1 tablet (4 mg total) by mouth every 8 (eight) hours as needed for Nausea. 60 tablet 2    pantoprazole (PROTONIX) 40 MG tablet Take 40 mg by mouth once daily.      pen needle, diabetic (BD ULTRA-FINE MAGALIE PEN NEEDLE) 32 gauge  "x 5/32" Ndle Inject into the skin 5 times per day with insulin 100 each 12    [DISCONTINUED] atenoloL (TENORMIN) 50 MG tablet Take 1 tablet (50 mg total) by mouth every other day. 45 tablet 3    [DISCONTINUED] omeprazole (PRILOSEC) 20 MG capsule Take 2 capsules (40 mg total) by mouth once daily. for 10 days 20 capsule 0    [DISCONTINUED] torsemide (DEMADEX) 20 MG Tab Take 1 tablet (20 mg total) by mouth 2 (two) times a day. (Patient taking differently: Take 20 mg by mouth once daily.) 60 tablet 1       Allergies:  Penicillins    Past Family History:  Reviewed; refer to Hospitalist Admission Note    Review of Systems:  Review of Systems - All 14 systems reviewed and negative, except as noted in HPI    Physical Exam:  General Appearance:    Chronically ill   Head:    Normocephalic, atraumatic   Eyes:    PER, EOMI, and conjunctiva/sclera clear bilaterally       Mouth:   Moist mucus membranes, no thrush or oral lesions,       normal dentition   Back:     No CVA tenderness   Lungs:     Clear to auscultation bilaterally, no wheezes, crackles,           rales or rhonchi, symmetric air movement, respirations unlabored   Chest wall:    No tenderness or deformity   Heart:    Regular rate and rhythm, S1 and S2 normal, no murmur, rub   or gallop   Abdomen:     Soft, non-tender, non-distended, bowel sounds active all four   quadrants, no RT or guarding, no masses, no organomegaly   Extremities:   Warm and well perfused, distal pulses are intact, no             cyanosis or peripheral edema   MSK:   No joint or muscle swelling, tenderness or deformity   Skin:   Skin color, texture, turgor normal, no rashes or lesions   Neurologic/Psychiatric:   CNII-XII intact, depressed affect     Results:  Lab Results   Component Value Date     01/26/2023    K 3.9 01/26/2023    CL 97 01/26/2023    CO2 24 01/26/2023    BUN 38 (H) 01/26/2023    CREATININE 5.1 (H) 01/26/2023    CALCIUM 9.4 01/26/2023    ANIONGAP 18 (H) 01/26/2023    " ESTGFRAFRICA 20 (A) 07/31/2022    EGFRNONAA 18 (A) 07/31/2022       Lab Results   Component Value Date    CALCIUM 9.4 01/26/2023    PHOS 4.8 (H) 01/26/2023       Recent Labs   Lab 01/26/23  0441   WBC 9.06   RBC 4.14   HGB 11.9*   HCT 33.7*   PLT 62*   MCV 81*   MCH 28.7   MCHC 35.3       I have personally reviewed pertinent radiological imaging and reports.    I have spent > 70 minutes providing care for this patient for the above diagnoses. These services have included chart/data/imaging review, evaluation, exam, formulation of plan, , note preparation, and discussions with staff involved in this patient's care.    Prateek Clark MD MPH  [facility] [place]  [address] [place] [id number]  [phone] (p)  [phone] (f)

## 2023-01-26 NOTE — ASSESSMENT & PLAN NOTE
Chronic, uncontrolled.  Latest blood pressure and vitals reviewed-   Temp:  [97.2 °F (36.2 °C)-98.6 °F (37 °C)]   Pulse:  []   Resp:  [16-20]   BP: (144-193)/()   SpO2:  [95 %-100 %] .   Home meds for hypertension were reviewed and noted below.   Hypertension Medications             amLODIPine (NORVASC) 10 MG tablet Take 1 tablet (10 mg total) by mouth once daily.    carvediloL (COREG) 25 MG tablet Take 1 tablet (25 mg total) by mouth 2 (two) times daily.    cloNIDine 0.2 mg/24 hr td ptwk (CATAPRES) 0.2 mg/24 hr Place 1 patch onto the skin every 7 days.    furosemide (LASIX) 40 MG tablet Take 40 mg by mouth 2 (two) times a day.    hydrALAZINE (APRESOLINE) 100 MG tablet Take 1 tablet (100 mg total) by mouth every 8 (eight) hours.    isosorbide mononitrate (IMDUR) 60 MG 24 hr tablet Take 2 tablets (120 mg total) by mouth once daily.    losartan-hydrochlorothiazide 100-12.5 mg (HYZAAR) 100-12.5 mg Tab Take 1 tablet by mouth once daily.          While in the hospital, will manage blood pressure as follows; Continue home antihypertensive regimen    Will utilize p.r.n. blood pressure medication only if patient's blood pressure greater than  180/110 and she develops symptoms such as worsening chest pain or shortness of breath.

## 2023-01-26 NOTE — PLAN OF CARE
9:39am - SW met with pt and unable to complete discharge assessment, because pt had difficulty staying awake, will try again later.    1:42pm - SW visited pt again, while she was having dialysis.  Pt verified date of birth, but would fall back asleep before completing response to other questions.  Unable to complete discharge assessment.     01/26/23 1341   Discharge Assessment   Assessment Type Discharge Planning Assessment   Current cognitive status: Unable to Assess

## 2023-01-26 NOTE — PROGRESS NOTES
01/26/23 1450   Required for all Hemodialysis Patients   Hepatitis Status negative   Handoff Report   Given To Zhou   Vital Signs   Temp 96.7 °F (35.9 °C)   Temp src Axillary   Pulse 85   Heart Rate Source Monitor   Resp 16   SpO2 97 %   Pulse Oximetry Type Intermittent   Device (Oxygen Therapy) room air   /70   BP Location Left arm   BP Method Automatic   Patient Position Lying   Assessments (Pre/Post)   Consent Obtained yes   Date Hepatitis Profile Obtained 01/05/23   Blood Liters Processed (BLP) 48.3   Transport Modality not applicable  (Bedside)   Level of Consciousness (AVPU) responds to voice        Hemodialysis Catheter 12/09/22 right subclavian   Placement Date: 12/09/22   Location: right subclavian   Line Necessity Review CRRT/HD   Site Assessment No drainage;No redness;No swelling;No warmth   Line Securement Device Secured with sutures   Dressing Type   (CHG gel)   Dressing Status Clean;Dry;Intact   Dressing Intervention Integrity maintained   Date on Dressing 01/24/23   Dressing Due to be Changed 01/30/23   Venous Patency/Care flushed w/o difficulty;normal saline locked  (Capped)   Arterial Patency/Care flushed w/o difficulty;normal saline locked  (capped)   Post-Hemodialysis Assessment   Rinseback Volume (mL) 250 mL   Blood Volume Processed (Liters) 48.3 L   Dialyzer Clearance Lightly streaked   Duration of Treatment 180 minutes   Additional Fluid Intake (mL) 500 mL   Total UF (mL) 3600 mL   Net Fluid Removal 3100   Patient Response to Treatment Tolerated   Post-Treatment Weight 48.3 kg (106 lb 7.7 oz)   Treatment Weight Change -3.2   Post-Hemodialysis Comments HD complete/Blood reinfused per protocol. No Heparin in Cath due to low Platelets. Attempt to change cath dressing unsuccessful due to pt not wanting to turn over in a position where cath can be accessed.  No distress noted from Dialysis

## 2023-01-26 NOTE — ASSESSMENT & PLAN NOTE
Noted, ongoing issue  Patient is currently on 21st admission this year for same complaints with repeated requests for dilaudid specifically.

## 2023-01-26 NOTE — ASSESSMENT & PLAN NOTE
Patient's FSGs are uncontrolled due to hypoglycemia on current medication regimen.  Last A1c reviewed-   Lab Results   Component Value Date    HGBA1C 7.5 (H) 12/23/2022     Most recent fingerstick glucose reviewed-   Recent Labs   Lab 01/25/23  1835 01/25/23 1837 01/25/23 1918 01/25/23 2046   POCTGLUCOSE 42* 48* 101 75     Current correctional scale  Medium  Maintain anti-hyperglycemic dose as follows-   Antihyperglycemics (From admission, onward)    Start     Stop Route Frequency Ordered    01/25/23 2034  insulin aspart U-100 pen 1-10 Units         -- SubQ Before meals & nightly PRN 01/25/23 1936        Hold Oral hypoglycemics while patient is in the hospital.    D10 infusion at 25cc/hr for persistent hypoglycemia.

## 2023-01-26 NOTE — ASSESSMENT & PLAN NOTE
Creatine stable for now. BMP reviewed- noted Estimated Creatinine Clearance: 12.5 mL/min (A) (based on SCr of 4.4 mg/dL (H)). according to latest data. Monitor UOP and serial BMP and adjust therapy as needed. Renally dose meds.      Dialysis T/Th/Sa  Last treatment Tuesday (yesterday)  Consult nephrology for orders

## 2023-01-26 NOTE — ED NOTES
Report received from KAIN Dasilva RN at this time. Pt resting on stretcher comfortably, remains on CCM with VSS. Pt currently boarder status.

## 2023-01-26 NOTE — ED NOTES
When rounding on pt, vomitus found to be covering floor. When asking pt why she refused to use the multiple provided emesis bags found on stretcher, pt would not provide answer. PRN antiemetics given per MD order.

## 2023-01-27 VITALS
RESPIRATION RATE: 18 BRPM | HEIGHT: 62 IN | HEART RATE: 80 BPM | WEIGHT: 96.81 LBS | SYSTOLIC BLOOD PRESSURE: 106 MMHG | TEMPERATURE: 98 F | BODY MASS INDEX: 17.81 KG/M2 | OXYGEN SATURATION: 93 % | DIASTOLIC BLOOD PRESSURE: 59 MMHG

## 2023-01-27 PROBLEM — I16.1 HYPERTENSIVE EMERGENCY: Status: RESOLVED | Noted: 2022-07-08 | Resolved: 2023-01-27

## 2023-01-27 PROBLEM — E16.2 HYPOGLYCEMIA: Status: RESOLVED | Noted: 2023-01-25 | Resolved: 2023-01-27

## 2023-01-27 PROBLEM — I16.0 HYPERTENSIVE URGENCY: Status: RESOLVED | Noted: 2021-04-24 | Resolved: 2023-01-27

## 2023-01-27 PROBLEM — M89.9 CHRONIC KIDNEY DISEASE-MINERAL AND BONE DISORDER: Status: RESOLVED | Noted: 2021-04-24 | Resolved: 2023-01-27

## 2023-01-27 PROBLEM — N18.6 TYPE 2 DIABETES MELLITUS WITH CHRONIC KIDNEY DISEASE ON CHRONIC DIALYSIS, WITH LONG-TERM CURRENT USE OF INSULIN: Chronic | Status: RESOLVED | Noted: 2019-10-16 | Resolved: 2023-01-27

## 2023-01-27 PROBLEM — R11.2 NAUSEA AND VOMITING: Status: RESOLVED | Noted: 2022-11-11 | Resolved: 2023-01-27

## 2023-01-27 PROBLEM — N18.9 CHRONIC KIDNEY DISEASE-MINERAL AND BONE DISORDER: Status: RESOLVED | Noted: 2021-04-24 | Resolved: 2023-01-27

## 2023-01-27 PROBLEM — R11.2 INTRACTABLE NAUSEA AND VOMITING: Status: RESOLVED | Noted: 2019-10-16 | Resolved: 2023-01-27

## 2023-01-27 PROBLEM — E83.9 CHRONIC KIDNEY DISEASE-MINERAL AND BONE DISORDER: Status: RESOLVED | Noted: 2021-04-24 | Resolved: 2023-01-27

## 2023-01-27 PROBLEM — Z99.2 TYPE 2 DIABETES MELLITUS WITH CHRONIC KIDNEY DISEASE ON CHRONIC DIALYSIS, WITH LONG-TERM CURRENT USE OF INSULIN: Chronic | Status: RESOLVED | Noted: 2019-10-16 | Resolved: 2023-01-27

## 2023-01-27 PROBLEM — E11.22 TYPE 2 DIABETES MELLITUS WITH CHRONIC KIDNEY DISEASE ON CHRONIC DIALYSIS, WITH LONG-TERM CURRENT USE OF INSULIN: Chronic | Status: RESOLVED | Noted: 2019-10-16 | Resolved: 2023-01-27

## 2023-01-27 PROBLEM — Z79.4 TYPE 2 DIABETES MELLITUS WITH CHRONIC KIDNEY DISEASE ON CHRONIC DIALYSIS, WITH LONG-TERM CURRENT USE OF INSULIN: Chronic | Status: RESOLVED | Noted: 2019-10-16 | Resolved: 2023-01-27

## 2023-01-27 PROBLEM — E11.9 DM (DIABETES MELLITUS): Status: ACTIVE | Noted: 2023-01-27

## 2023-01-27 LAB
ALBUMIN SERPL BCP-MCNC: 3.1 G/DL (ref 3.5–5.2)
ALP SERPL-CCNC: 154 U/L (ref 55–135)
ALT SERPL W/O P-5'-P-CCNC: 16 U/L (ref 10–44)
ANION GAP SERPL CALC-SCNC: 9 MMOL/L (ref 8–16)
AST SERPL-CCNC: 21 U/L (ref 10–40)
BASOPHILS # BLD AUTO: 0.04 K/UL (ref 0–0.2)
BASOPHILS NFR BLD: 0.7 % (ref 0–1.9)
BILIRUB SERPL-MCNC: 1.5 MG/DL (ref 0.1–1)
BLD PROD TYP BPU: NORMAL
BLD PROD TYP BPU: NORMAL
BLOOD UNIT EXPIRATION DATE: NORMAL
BLOOD UNIT EXPIRATION DATE: NORMAL
BLOOD UNIT TYPE CODE: 6200
BLOOD UNIT TYPE CODE: 6200
BLOOD UNIT TYPE: NORMAL
BLOOD UNIT TYPE: NORMAL
BUN SERPL-MCNC: 26 MG/DL (ref 6–20)
CALCIUM SERPL-MCNC: 8.9 MG/DL (ref 8.7–10.5)
CHLORIDE SERPL-SCNC: 102 MMOL/L (ref 95–110)
CO2 SERPL-SCNC: 25 MMOL/L (ref 23–29)
CODING SYSTEM: NORMAL
CODING SYSTEM: NORMAL
CREAT SERPL-MCNC: 4.1 MG/DL (ref 0.5–1.4)
CROSSMATCH INTERPRETATION: NORMAL
CROSSMATCH INTERPRETATION: NORMAL
DIFFERENTIAL METHOD: ABNORMAL
DISPENSE STATUS: NORMAL
DISPENSE STATUS: NORMAL
EOSINOPHIL # BLD AUTO: 0 K/UL (ref 0–0.5)
EOSINOPHIL NFR BLD: 0.7 % (ref 0–8)
ERYTHROCYTE [DISTWIDTH] IN BLOOD BY AUTOMATED COUNT: 15.5 % (ref 11.5–14.5)
EST. GFR  (NO RACE VARIABLE): 14 ML/MIN/1.73 M^2
GLUCOSE SERPL-MCNC: 256 MG/DL (ref 70–110)
HCT VFR BLD AUTO: 29.3 % (ref 37–48.5)
HGB BLD-MCNC: 9.9 G/DL (ref 12–16)
IMM GRANULOCYTES # BLD AUTO: 0.03 K/UL (ref 0–0.04)
IMM GRANULOCYTES NFR BLD AUTO: 0.5 % (ref 0–0.5)
LYMPHOCYTES # BLD AUTO: 1.2 K/UL (ref 1–4.8)
LYMPHOCYTES NFR BLD: 19.3 % (ref 18–48)
MAGNESIUM SERPL-MCNC: 2.1 MG/DL (ref 1.6–2.6)
MCH RBC QN AUTO: 28.4 PG (ref 27–31)
MCHC RBC AUTO-ENTMCNC: 33.8 G/DL (ref 32–36)
MCV RBC AUTO: 84 FL (ref 82–98)
MONOCYTES # BLD AUTO: 0.4 K/UL (ref 0.3–1)
MONOCYTES NFR BLD: 7.2 % (ref 4–15)
NEUTROPHILS # BLD AUTO: 4.3 K/UL (ref 1.8–7.7)
NEUTROPHILS NFR BLD: 71.6 % (ref 38–73)
NRBC BLD-RTO: 0 /100 WBC
NUM UNITS TRANS PACKED RBC: NORMAL
NUM UNITS TRANS PACKED RBC: NORMAL
PHOSPHATE SERPL-MCNC: 4.4 MG/DL (ref 2.7–4.5)
PLATELET # BLD AUTO: 79 K/UL (ref 150–450)
PMV BLD AUTO: 10 FL (ref 9.2–12.9)
POCT GLUCOSE: 260 MG/DL (ref 70–110)
POCT GLUCOSE: 305 MG/DL (ref 70–110)
POTASSIUM SERPL-SCNC: 4.1 MMOL/L (ref 3.5–5.1)
PROT SERPL-MCNC: 6.5 G/DL (ref 6–8.4)
RBC # BLD AUTO: 3.49 M/UL (ref 4–5.4)
SODIUM SERPL-SCNC: 136 MMOL/L (ref 136–145)
WBC # BLD AUTO: 5.96 K/UL (ref 3.9–12.7)

## 2023-01-27 PROCEDURE — 83735 ASSAY OF MAGNESIUM: CPT | Performed by: STUDENT IN AN ORGANIZED HEALTH CARE EDUCATION/TRAINING PROGRAM

## 2023-01-27 PROCEDURE — 63600175 PHARM REV CODE 636 W HCPCS: Performed by: NURSE PRACTITIONER

## 2023-01-27 PROCEDURE — 84100 ASSAY OF PHOSPHORUS: CPT | Performed by: STUDENT IN AN ORGANIZED HEALTH CARE EDUCATION/TRAINING PROGRAM

## 2023-01-27 PROCEDURE — 25000003 PHARM REV CODE 250: Performed by: NURSE PRACTITIONER

## 2023-01-27 PROCEDURE — G0378 HOSPITAL OBSERVATION PER HR: HCPCS

## 2023-01-27 PROCEDURE — 25000003 PHARM REV CODE 250: Performed by: STUDENT IN AN ORGANIZED HEALTH CARE EDUCATION/TRAINING PROGRAM

## 2023-01-27 PROCEDURE — 97161 PT EVAL LOW COMPLEX 20 MIN: CPT

## 2023-01-27 PROCEDURE — 80053 COMPREHEN METABOLIC PANEL: CPT | Performed by: STUDENT IN AN ORGANIZED HEALTH CARE EDUCATION/TRAINING PROGRAM

## 2023-01-27 PROCEDURE — 94761 N-INVAS EAR/PLS OXIMETRY MLT: CPT

## 2023-01-27 PROCEDURE — 85025 COMPLETE CBC W/AUTO DIFF WBC: CPT | Performed by: STUDENT IN AN ORGANIZED HEALTH CARE EDUCATION/TRAINING PROGRAM

## 2023-01-27 PROCEDURE — 36415 COLL VENOUS BLD VENIPUNCTURE: CPT | Performed by: STUDENT IN AN ORGANIZED HEALTH CARE EDUCATION/TRAINING PROGRAM

## 2023-01-27 PROCEDURE — 96372 THER/PROPH/DIAG INJ SC/IM: CPT | Performed by: NURSE PRACTITIONER

## 2023-01-27 PROCEDURE — 97165 OT EVAL LOW COMPLEX 30 MIN: CPT

## 2023-01-27 RX ORDER — INSULIN ASPART 100 [IU]/ML
1-10 INJECTION, SOLUTION INTRAVENOUS; SUBCUTANEOUS
Qty: 3 ML | Refills: 6 | Status: CANCELLED | OUTPATIENT
Start: 2023-01-27

## 2023-01-27 RX ADMIN — ISOSORBIDE MONONITRATE 120 MG: 60 TABLET, EXTENDED RELEASE ORAL at 08:01

## 2023-01-27 RX ADMIN — CARVEDILOL 25 MG: 25 TABLET, FILM COATED ORAL at 08:01

## 2023-01-27 RX ADMIN — HYDRALAZINE HYDROCHLORIDE 100 MG: 25 TABLET, FILM COATED ORAL at 05:01

## 2023-01-27 RX ADMIN — INSULIN ASPART 6 UNITS: 100 INJECTION, SOLUTION INTRAVENOUS; SUBCUTANEOUS at 08:01

## 2023-01-27 RX ADMIN — AMLODIPINE BESYLATE 10 MG: 5 TABLET ORAL at 08:01

## 2023-01-27 RX ADMIN — INSULIN ASPART 8 UNITS: 100 INJECTION, SOLUTION INTRAVENOUS; SUBCUTANEOUS at 11:01

## 2023-01-27 RX ADMIN — GABAPENTIN 100 MG: 100 CAPSULE ORAL at 08:01

## 2023-01-27 RX ADMIN — FUROSEMIDE 40 MG: 40 TABLET ORAL at 08:01

## 2023-01-27 RX ADMIN — LEVETIRACETAM 500 MG: 500 TABLET, FILM COATED ORAL at 08:01

## 2023-01-27 RX ADMIN — HYDROCHLOROTHIAZIDE 12.5 MG: 12.5 TABLET ORAL at 08:01

## 2023-01-27 RX ADMIN — FLUOXETINE HYDROCHLORIDE 20 MG: 20 CAPSULE ORAL at 08:01

## 2023-01-27 RX ADMIN — ACETAMINOPHEN 650 MG: 325 TABLET ORAL at 05:01

## 2023-01-27 RX ADMIN — LOSARTAN POTASSIUM 100 MG: 100 TABLET, FILM COATED ORAL at 08:01

## 2023-01-27 RX ADMIN — PANTOPRAZOLE SODIUM 40 MG: 40 TABLET, DELAYED RELEASE ORAL at 08:01

## 2023-01-27 NOTE — PLAN OF CARE
CM requested follow up apt at [facility] from Veronica Arellano. Veronica will contact pt to schedule    0805- apt received. Updated AVS

## 2023-01-27 NOTE — PLAN OF CARE
Problem: Occupational Therapy  Goal: Occupational Therapy Goal  Description: Goals to be met by: 2/10/2023     Patient will increase functional independence with ADLs by performing:    UE Dressing with Set-up Assistance.  LE Dressing with Minimal Assistance.  Grooming with Set-up Assistance.  Toileting from toilet with Stand-by Assistance for hygiene and clothing management.   Toilet transfer to toilet with Stand-by Assistance.    Outcome: Ongoing, Progressing     OT evaluation completed. POC established.

## 2023-01-27 NOTE — PROGRESS NOTES
Patient Discharge Counseling Note    Pharmacist discharge counseling was provided to the patient. The patient confessed that she needed refills on all of her medication but because she was blind, she was unable to call the pharmacy. I called her pharmacy ([facility] outpatient [facility]) and connected her with a staff member to discuss which medications she needed to refill.    I observed that the patient was mainly concerned about her Hydromorphone prescription which she did not have for the pharmacy to fill. The patient did not confirm if she had refills for her other medication even thought she initially expressed that she needed a refill on everything prior to the phone call. Afterwards, I discussed her final discharge medication list and she seemed somewhat familiar with the names of the medication and their indication. She stated that her step mother manages all of her medication since she is unable to see.    Verbal education was provided. The following aspects of medication therapy have been reviewed with the patient and/or caregiver.    What each medication is used for  How to take each medication   What to expect from each medication  Side effects of each medication  Proper storage of medication  Proper disposal of  or discontinued medications  Communicate with provider of pharmacist with any questions or concerns of side effects  Importance of medication completion (if applicable)  Importance of medication compliance    All questions were answered, and the patient and/or caregiver demonstrated understanding.    Kasandra Foster, PharmD  Clinical Pharmacist

## 2023-01-27 NOTE — PT/OT/SLP EVAL
Physical Therapy Evaluation    Patient Name:  Tabby Howard   MRN:  [id number]    Recommendations:     Discharge Recommendations: home health PT   Discharge Equipment Recommendations: walker, rolling   Barriers to discharge: None    Assessment:     Tabby Howard is a 33 y.o. female admitted with a medical diagnosis of Hypoglycemia.  She presents with the following impairments/functional limitations: weakness, impaired endurance, impaired functional mobility, impaired balance, pain, impaired cardiopulmonary response to activity .    Pt seen supine in bed with student nurse present. 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 Pt stated she required help from her daughters for safe mobility. Pt requiring min assist to safely ambulate at hallways 200ft with assist for direction and maneuvering RW. Pt stated has standard walker at home.  Pt to benefit from RW and HHPT services.    Rehab Prognosis: Fair; patient would benefit from acute skilled PT services to address these deficits and reach maximum level of function.    Recent Surgery: * No surgery found *      Plan:     During this hospitalization, patient to be seen 6 x/week to address the identified rehab impairments via gait training, therapeutic activities, therapeutic exercises and progress toward the following goals:    Plan of Care Expires:  02/28/23    Subjective   Pt stated her vision worsened in the last couple of months and has cataract- will need surgery  Pt known to this PT- pt had lost weight from last year  Chief Complaint: abdominal and back pain- waiting on nurse to give her pain meds  Patient/Family Comments/goals: none stated  Pain/Comfort:  Pain Rating 1: 10/10  Location 1: back  Pain Addressed 1: Reposition, Distraction, Cessation of Activity, Nurse notified    Patients cultural, spiritual, Buddhist conflicts given the current situation:      Living Environment:  Home with daughters, dad and step mom  Prior to admission,  patients level of function was ambulatory with SW with assist.  Equipment used at home: walker, standard.  DME owned (not currently used): none.  Upon discharge, patient will have assistance from family.    Objective:     Communicated with nurse Epperson prior to session.  Patient found HOB elevated with telemetry (student nurse sitting with pt)  upon PT entry to room.    General Precautions: Standard, fall, blind, seizure  Orthopedic Precautions:N/A   Braces: N/A  Respiratory Status: Room air    Exams:  Postural Exam:  Patient presented with the following abnormalities:    -       Rounded shoulders  -       Forward head  -       BMI 17  Pt known to this PT with last  BMI  of  28  05-30-22    RLE ROM: WFL  RLE Strength: Deficits: 3+/5  LLE ROM: WFL  LLE Strength: Deficits: 3+/5    Functional Mobility:  Bed Mobility:     Rolling Right: contact guard assistance  Scooting: contact guard assistance  Supine to Sit: minimum assistance  Transfers:     Sit to Stand:  minimum assistance with rolling walker  Bed to Chair: minimum assistance with  rolling walker  using  Stand Pivot  Gait: 200ft with RW min assist and another person following with chair  Assist maneuvering RW      AM-PAC 6 CLICK MOBILITY  Total Score:17       Treatment & Education:  Patient was educated on the importance of OOB activity and functional mobility to negate negative effects of prolonged bed rest during hospitalization, safe transfers and ambulation, and D/C planning   OOb chair post PT  Pt encouraged ankle pumping exercises  Student nurse present    Patient left up in chair with all lines intact, call button in reach, chair alarm on, and student nurse present.    GOALS:   Multidisciplinary Problems       Physical Therapy Goals          Problem: Physical Therapy    Goal Priority Disciplines Outcome Goal Variances Interventions   Physical Therapy Goal     PT, PT/OT Ongoing, Progressing     Description: Goals to be met by: 02-28-23     Patient will  increase functional independence with mobility by performin. Supine to sit with Modified [place]  2. Sit to stand transfer with Contact Guard Assistance  3. Bed to chair transfer with Contact Guard Assistance using Rolling Walker  4. Gait  x 250 feet with Contact Guard Assistance using Rolling Walker.   5. Lower extremity exercise program x20 reps                       History:     Past Medical History:   Diagnosis Date    CKD (chronic kidney disease), stage IV 2022    Diabetes mellitus due to underlying condition with unspecified complications 2022    Gastroparesis 2022    Heart failure with preserved ejection fraction 2022    EF 55% on 3/22    History of gastroesophageal reflux (GERD)     History of supraventricular tachycardia     Hyperkalemia 2022    Hypertensive emergency 2022    Sickle cell trait 2022    Type 2 diabetes mellitus        Past Surgical History:   Procedure Laterality Date     SECTION      x 3    COLONOSCOPY      COLONOSCOPY N/A 2022    Procedure: COLONOSCOPY;  Surgeon: Jagdeep Cedeno MD;  Location: [facility];  Service: Endoscopy;  Laterality: N/A;    ESOPHAGOGASTRODUODENOSCOPY N/A 10/18/2019    Procedure: ESOPHAGOGASTRODUODENOSCOPY (EGD);  Surgeon: Gianluca Mendez MD;  Location: [facility];  Service: Endoscopy;  Laterality: N/A;    ESOPHAGOGASTRODUODENOSCOPY N/A 2022    Procedure: EGD (ESOPHAGOGASTRODUODENOSCOPY);  Surgeon: Micky Paredes III, MD;  Location: [facility];  Service: Endoscopy;  Laterality: N/A;    ESOPHAGOGASTRODUODENOSCOPY N/A 2022    Procedure: EGD (ESOPHAGOGASTRODUODENOSCOPY);  Surgeon: Marcelo Zhong MD;  Location: [facility];  Service: Endoscopy;  Laterality: N/A;    LAPAROSCOPIC CHOLECYSTECTOMY N/A 2022    Procedure: CHOLECYSTECTOMY, LAPAROSCOPIC;  Surgeon: Grey Perez MD;  Location: [facility];  Service: General;  Laterality: N/A;    PLACEMENT OF DUAL-LUMEN VASCULAR CATHETER Left 2022     Procedure: INSERTION-CATHETER-JOSEPH;  Surgeon: Dionte Gan MD;  Location: [facility] OR;  Service: General;  Laterality: Left;    PLACEMENT OF DUAL-LUMEN VASCULAR CATHETER Right 07/26/2022    Procedure: INSERTION-CATHETER-Hemosplit;  Surgeon: Dionte Gan MD;  Location: [facility] OR;  Service: General;  Laterality: Right;       Time Tracking:     PT Received On: 01/27/23  PT Start Time: 0937     PT Stop Time: 0952  PT Total Time (min): 15 min     Billable Minutes: Evaluation 15      01/27/2023

## 2023-01-27 NOTE — NURSING
IV and tele removed. Orders reviewed. No new prescription. Ride here pt escorted to car . Pt d/c home

## 2023-01-27 NOTE — PLAN OF CARE
Pt clear for DC from case management standpoint. Discharging to home      Pt needs to work with pt/ot prior to DC (therapy dept aware of pending DC)  Dr. Parker to round on pt prior to DC     01/27/23 0807   Final Note   Assessment Type Final Discharge Note   Anticipated Discharge Disposition Home   Hospital Resources/Appts/Education Provided Appointments scheduled and added to AVS

## 2023-01-27 NOTE — NURSING
Nurses Note -- 4 Eyes      1/26/2023   6:46 PM      Skin assessed during: Admit      [x] No Pressure Injuries Present    []Prevention Measures Documented      [] Yes- Altered Skin Integrity Present or Discovered   [] LDA Added if Not in Epic (Describe Wound)   [] New Altered Skin Integrity was Present on Admit and Documented in LDA   [] Wound Image Taken    Wound Care Consulted? No    Attending Nurse:  Zhou Wood LPN     Second RN/Staff Member:  Robert Schwab RN

## 2023-01-27 NOTE — PLAN OF CARE
Problem: Physical Therapy  Goal: Physical Therapy Goal  Description: Goals to be met by: 23     Patient will increase functional independence with mobility by performin. Supine to sit with Modified [place]  2. Sit to stand transfer with Contact Guard Assistance  3. Bed to chair transfer with Contact Guard Assistance using Rolling Walker  4. Gait  x 250 feet with Contact Guard Assistance using Rolling Walker.   5. Lower extremity exercise program x20 reps  Outcome: Ongoing, Progressing   PT eval and treat. Pt with poor vision- blind R eye and blurriness L eye. Pt requiring min assist to ambulate 200ft with assist for direction and managing RW. HHPT/RW- has standard walker

## 2023-01-27 NOTE — ASSESSMENT & PLAN NOTE
Patient is currently on 21st admission this year for same complaints with repeated requests for Dilaudid specifically.  -Avoid opiate use in future hospitalizations

## 2023-01-27 NOTE — PLAN OF CARE
Plan of care reviewed with patient,verbalized understanding. Sr on telemetry. HS bg 241, covered with ss insulin as per orders. Tolerating clear liquid diet. Remains free from falls, injury. Instructed to call for assistance as needed ,verbalized understanding. Bed in low & locked position. Call light in reach, bed alarm on .

## 2023-01-27 NOTE — DISCHARGE SUMMARY
[facility] Hospital Medicine  Discharge Summary      Patient Name: Tabby Howard  MRN: [id number]  UNIQUE: [id number]  Patient Class: OP- Observation  Admission Date: 1/25/2023  Hospital Length of Stay: 0 days  Discharge Date and Time: No discharge date for patient encounter.  Attending Physician: Raymundo Parker MD   Discharging Provider: Raymundo Parker MD  Primary Care Provider: [facility]    Primary Care Team: Networked reference to record PCT     HPI:   Tabby Howard is a 32 year old female with ESRD on HD, DM, HFpEF, GERD, suspected gastroparesis, sickle cell trait, HTN, who presented to the ED for evaluation of abdominal pain with nausea and vomiting. She reports ongoing abdominal pain for the past two days. She has been admitted 20 times for the same complaint in the past year, with her most recent hospitalization ending today, as she was discharged 20 minutes prior to arrival to this ED. She is a dialysis patient is receives dialysis on T/Th/Sat, with her most recent treatment done this Tuesday. She states she has been having nausea, vomiting, shortness of breath and worsening vision changes. 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 She denies all other complaint.     ED work up included a CBC which showed chronic anemia and thrombocytopenia. CMP showed chronic kidney disease around baseline. She experienced several episodes of hypoglycemia in the ED, and is refusing to eat snacks when provided. She did receive Levemir at [facility] this morning but denies taking other insulin today.   CT of the abdomen performed which showed no acute intraabdominal abnormalities. She received 2 amps of D 50 in the ED. Troponin chronically elevated at 0.040 and at baseline, and likely due to end-stage renal disease. EKG today without evidence of ischemia. Hospital Medicine consulted for admission and further  management.       * No surgery found *      Hospital Course:   Tabby Howard is a 33 year old female with a past medical history of ESRD, presumed gastroparesis, DM, HFpEF, GERD, HTN, MDD, seizure disorder and anemia who presented with hypoglycemia, nausea and vomiting. The patient was treated supportively with D10 infusion and with PRN antiemetics. She was able to be weaned from the dextrose infusion and was able to tolerate PO. Her symptoms improved throughout her course. She was discharged 1/27/2023 and she will follow up with her PCP in the outpatient setting.       Goals of Care Treatment Preferences:  Code Status: Full Code    Health care agent: Step-Mother Tanya Howard / Father Garcia Howard  Health care agent number: [phone] / [phone]          What is most important right now is to focus on remaining as independent as possible.  Accordingly, we have decided that the best plan to meet the patient's goals includes continuing with treatment.      Consults:   Consults (From admission, onward)        Status Ordering Provider     Inpatient consult to Nephrology  Once        Provider:  Prateek Clark MD    Completed RIVAS PANIAGUA          Seizure disorder  -Continue home Keppra    DM (diabetes mellitus)  -Continue home insulin regimen    Drug-seeking behavior  Patient is currently on 21st admission this year for same complaints with repeated requests for Dilaudid specifically.  -Avoid opiate use in future hospitalizations    Anemia of chronic kidney failure, stage 5  Chronic. Stable.      Thrombocytopenia  Chronic. Stable.    Malnutrition of moderate degree  -Renal diet  -Encourage PO intake      Hypertension  -Continue home medications      ESRD (end stage renal disease) on dialysis  -HD per Nephrology    Pericardial effusion  Chronic. Stable.    Gastroparesis - suspected, unconfirmed  Patient will need a gastric emptying study in the outpatient setting.    Gastritis  -Continue PPI      Final  Active Diagnoses:    Diagnosis Date Noted POA    Seizure disorder [G40.909] 05/29/2022 Yes     Chronic    DM (diabetes mellitus) [E11.9] 01/27/2023 Yes    Drug-seeking behavior [Z76.5] 01/24/2023 Yes    Anemia of chronic kidney failure, stage 5 [N18.5, D63.1] 10/31/2022 Yes     Chronic    Thrombocytopenia [D69.6] 10/26/2022 Yes     Chronic    Malnutrition of moderate degree [E44.0] 10/05/2022 Yes    Hypertension [I10] 07/30/2022 Yes     Chronic    ESRD (end stage renal disease) on dialysis [N18.6, Z99.2] 07/22/2022 Not Applicable     Chronic    Gastroparesis - suspected, unconfirmed [K31.84] 04/12/2022 Yes     Chronic    Pericardial effusion [I31.39] 03/07/2022 Yes    Gastritis [K29.70] 10/15/2019 Yes      Problems Resolved During this Admission:    Diagnosis Date Noted Date Resolved POA    PRINCIPAL PROBLEM:  Hypoglycemia [E16.2] 01/25/2023 01/27/2023 Yes    Intractable nausea and vomiting [R11.2] 10/16/2019 01/27/2023 Yes       Discharged Condition: stable    Disposition: Home or Self Care    Follow Up:   Follow-up Information     [facility] Follow up in 2 week(s).    Contact information:  Leroy MURRY [id number] [phone]                       Patient Instructions:      Diet renal     Notify your health care provider if you experience any of the following:  increased confusion or weakness     Notify your health care provider if you experience any of the following:  persistent dizziness, light-headedness, or visual disturbances     Notify your health care provider if you experience any of the following:  severe persistent headache     Notify your health care provider if you experience any of the following:  difficulty breathing or increased cough     Notify your health care provider if you experience any of the following:  severe uncontrolled pain     Notify your health care provider if you experience any of the following:  persistent nausea and  "vomiting or diarrhea     Notify your health care provider if you experience any of the following:  temperature >100.4     Activity as tolerated       Significant Diagnostic Studies: Labs: All labs within the past 24 hours have been reviewed    Pending Diagnostic Studies:     None         Medications:  Reconciled Home Medications:      Medication List      CONTINUE taking these medications    albuterol 90 mcg/actuation inhaler  Commonly known as: PROVENTIL/VENTOLIN HFA  Inhale 2 puffs into the lungs every 6 (six) hours as needed for Wheezing. Rescue     amLODIPine 10 MG tablet  Commonly known as: NORVASC  Take 1 tablet (10 mg total) by mouth once daily.     BD ULTRA-FINE MAGALIE PEN NEEDLE 32 gauge x 5/32" Ndle  Generic drug: pen needle, diabetic  Inject into the skin 5 times per day with insulin     carvediloL 25 MG tablet  Commonly known as: COREG  Take 1 tablet (25 mg total) by mouth 2 (two) times daily.     cloNIDine 0.2 mg/24 hr td ptwk 0.2 mg/24 hr  Commonly known as: CATAPRES  Place 1 patch onto the skin every 7 days.     ferrous sulfate 325 (65 FE) MG EC tablet  Take 325 mg by mouth once daily.     FLUoxetine 20 MG capsule  Take 1 capsule (20 mg total) by mouth once daily.     furosemide 40 MG tablet  Commonly known as: LASIX  Take 40 mg by mouth 2 (two) times a day.     gabapentin 100 MG capsule  Commonly known as: NEURONTIN  Take 1 capsule (100 mg total) by mouth 2 (two) times daily.     hydrALAZINE 100 MG tablet  Commonly known as: APRESOLINE  Take 1 tablet (100 mg total) by mouth every 8 (eight) hours.     isosorbide mononitrate 60 MG 24 hr tablet  Commonly known as: IMDUR  Take 2 tablets (120 mg total) by mouth once daily.     LANTUS SOLOSTAR U-100 INSULIN glargine 100 units/mL SubQ pen  Generic drug: insulin  Inject 16 Units into the skin every evening.     levETIRAcetam 500 MG Tab  Commonly known as: KEPPRA  Take 1 tablet (500 mg total) by mouth 2 (two) times daily.     losartan-hydrochlorothiazide " 100-12.5 mg 100-12.5 mg Tab  Commonly known as: HYZAAR  Take 1 tablet by mouth once daily.     mirtazapine 15 MG disintegrating tablet  Commonly known as: REMERON SOL-TAB  Take 1 tablet (15 mg total) by mouth nightly.     NovoLOG FlexPen U-100 Insulin 100 unit/mL (3 mL) Inpn pen  Generic drug: insulin aspart U-100  Inject 1-10 Units into the skin before meals and at bedtime as needed (Hyperglycemia). Max daily dose 40 units.     ondansetron 4 MG tablet  Commonly known as: ZOFRAN  Take 1 tablet (4 mg total) by mouth every 8 (eight) hours as needed for Nausea.     pantoprazole 40 MG tablet  Commonly known as: PROTONIX  Take 40 mg by mouth once daily.            Indwelling Lines/Drains at time of discharge:   Lines/Drains/Airways     Central Venous Catheter Line  Duration                Hemodialysis Catheter 12/09/22 right subclavian 49 days                Time spent on the discharge of patient: 31 minutes         Raymundo Parker MD  Department of Hospital Medicine  [facility]

## 2023-01-27 NOTE — PT/OT/SLP EVAL
Occupational Therapy Evaluation    Name: Tabby Howard  MRN: [id number]  Admitting Diagnosis: Hypoglycemia  Recent Surgery: * No surgery found *    Recommendations:     Discharge Recommendations: home health OT  Discharge Equipment Recommendations: None  Barriers to discharge: None    Assessment:     Tabby Howard is a 33 y.o. female with a medical diagnosis of Hypoglycemia. She presents with performance deficits affecting function including weakness, impaired endurance, impaired self care skills, impaired functional mobility, gait instability and visual deficits.    Rehab Prognosis: Good; patient would benefit from acute skilled OT services to address these deficits and reach maximum level of function.    Plan:     Patient to be seen 5 x/week to address the above listed problems via self-care/home management, therapeutic activities, therapeutic exercises  Plan of Care Expires: 02/10/23  Plan of Care Reviewed with: patient    Subjective     Chief Complaint: Fatigue and poor vision  Patient Comments/Goals: To rest  Pain/Comfort:  Pain Rating 1: 0/10    Patients cultural, spiritual, Cheondoism conflicts given the current situation: yes    Social History:  Living Environment: Patient lives with their family.   Prior Level of Function: Prior to admission, patient required min assistance with ADLs.  Equipment Used at Home: standard walker  Assistance Upon Discharge: family.    Objective:     Communicated with nurse prior to session. Patient found up in chair upon OT entry to room.    General Precautions: Standard, fall   Orthopedic Precautions:N/A   Braces: N/A  Respiratory Status: Room air    Occupational Performance    Functional Mobility/Transfers:  Sit <> Stand Transfer with minimum assistance with rolling walker    Activities of Daily Living:  Feeding:  independence  Grooming: set up assistance  Upper Body Dressing: minimum assistance  Lower Body Dressing: moderate assistance  Toileting: moderate  assistance    Cognitive/Visual Perceptual:  Cognitive/Psychosocial Skills:  -       Oriented to: Person, Place, Time, and Situation   -       Follows Commands/attention: Follows multistep commands  -       Communication: clear/fluent    Physical Exam:  Upper Extremity Range of Motion:  -       Right Upper Extremity: WFL  -       Left Upper Extremity: WFL  Upper Extremity Strength: -       Right Upper Extremity: WFL  -       Left Upper Extremity: WFL    AMPAC 6 Click ADL:  AMPAC Total Score: 16    Treatment & Education:  Patient educated on role of OT, POC and goals for therapy.       Patient left up in chair with all lines intact and call button in reach.    GOALS:   Multidisciplinary Problems       Occupational Therapy Goals          Problem: Occupational Therapy    Goal Priority Disciplines Outcome Interventions   Occupational Therapy Goal     OT, PT/OT Ongoing, Progressing    Description: Goals to be met by: 2/10/2023     Patient will increase functional independence with ADLs by performing:    UE Dressing with Set-up Assistance.  LE Dressing with Minimal Assistance.  Grooming with Set-up Assistance.  Toileting from toilet with Stand-by Assistance for hygiene and clothing management.   Toilet transfer to toilet with Stand-by Assistance.                         History:     Past Medical History:   Diagnosis Date    CKD (chronic kidney disease), stage IV 2022    Diabetes mellitus due to underlying condition with unspecified complications 2022    Gastroparesis 2022    Heart failure with preserved ejection fraction 2022    EF 55% on 3/22    History of gastroesophageal reflux (GERD)     History of supraventricular tachycardia     Hyperkalemia 2022    Hypertensive emergency 2022    Sickle cell trait 2022    Type 2 diabetes mellitus          Past Surgical History:   Procedure Laterality Date     SECTION      x 3    COLONOSCOPY      COLONOSCOPY N/A 2022    Procedure:  COLONOSCOPY;  Surgeon: Jagdeep Cedeno MD;  Location: [facility];  Service: Endoscopy;  Laterality: N/A;    ESOPHAGOGASTRODUODENOSCOPY N/A 10/18/2019    Procedure: ESOPHAGOGASTRODUODENOSCOPY (EGD);  Surgeon: Gianluca Mendez MD;  Location: [facility] ENDO;  Service: Endoscopy;  Laterality: N/A;    ESOPHAGOGASTRODUODENOSCOPY N/A 08/24/2022    Procedure: EGD (ESOPHAGOGASTRODUODENOSCOPY);  Surgeon: Micky Paredes III, MD;  Location: [facility] ENDO;  Service: Endoscopy;  Laterality: N/A;    ESOPHAGOGASTRODUODENOSCOPY N/A 12/5/2022    Procedure: EGD (ESOPHAGOGASTRODUODENOSCOPY);  Surgeon: Marcelo Zhong MD;  Location: [facility];  Service: Endoscopy;  Laterality: N/A;    LAPAROSCOPIC CHOLECYSTECTOMY N/A 07/30/2022    Procedure: CHOLECYSTECTOMY, LAPAROSCOPIC;  Surgeon: Grey Perez MD;  Location: [facility] OR;  Service: General;  Laterality: N/A;    PLACEMENT OF DUAL-LUMEN VASCULAR CATHETER Left 07/12/2022    Procedure: INSERTION-CATHETER-JOSEPH;  Surgeon: Dionte Gan MD;  Location: [facility] OR;  Service: General;  Laterality: Left;    PLACEMENT OF DUAL-LUMEN VASCULAR CATHETER Right 07/26/2022    Procedure: INSERTION-CATHETER-Hemosplit;  Surgeon: Dionte Gan MD;  Location: [facility] OR;  Service: General;  Laterality: Right;       Time Tracking:     OT Date of Treatment: 01/27/23  OT Start Time: 1115  OT Stop Time: 1125  OT Total Time (min): 10 min    Billable Minutes: Evaluation 10    1/27/2023

## 2023-01-27 NOTE — PROGRESS NOTES
"INPATIENT NEPHROLOGY Progress Note  [facility]    Patient Name: Tabby Howard  Date: 01/27/2023    Reason for consultation: ESRD    Chief Complaint:   Chief Complaint   Patient presents with    Abdominal Pain     Abd pain for past 2 days, last dialysis was last week, pt is suppose to have dialysis Tuesday, Thursday and Saturday.     Back Pain       History of Present Illness:  Tabby Howard is a 32 year old female with ESRD on HD, DM, HFpEF, GERD, gastroparesis, sickle cell trait, HTN, who presented to the ED for evaluation of abdominal pain with nausea and vomiting. She reports ongoing abdominal pain for the past two days, no exac/reliev factors. She has been admitted 20 times for the same complaint in the past year, with her most recent hospitalization ending today, as she was discharged from [facility] 20 minutes prior to arrival to this ED. She is a dialysis patient is receives dialysis on T/Th/Sat, with her most recent treatment done this Tuesday. We are consulted for dialysis. Per [facility] discharge summary: "She should not get admitted with chronic abdominal pain because she clearly has drug seeking behavior- has had 13 CT CAP since October 2022 which have all been negative. Did not receive dilaudid at [facility] this last admission."    Interval History:  1/26- seen on HD  1/27- most likely plan for discharge today; worked with therapy; sitting in chair    Plan of Care:    Assessment:  ESRD on HD TTS  HTN  SHPT  Anemia of CKD  Chronic abdominal pain    Plan:    - continue HD TTS  - continue home BP meds- ordered 2-4L UF-  - renal diet, 1.5L fluid restriction  - continue SHELLEY with HD  - needs to be established with pain management- may need to reside in a NH for overall care- hospitalizations is not the way to go    Thank you for allowing us to participate in this patient's care. We will continue to follow.    Vital Signs:  Temp Readings from Last 3 Encounters:   01/27/23 98.1 °F (36.7 °C) (Oral)   01/25/23 " 97.2 °F (36.2 °C) (Oral)   01/13/23 98.9 °F (37.2 °C)       Pulse Readings from Last 3 Encounters:   01/27/23 89   01/25/23 95   01/13/23 87       BP Readings from Last 3 Encounters:   01/27/23 (!) 159/96   01/25/23 (!) 191/128   01/13/23 133/82       Weight:  Wt Readings from Last 3 Encounters:   01/26/23 43.9 kg (96 lb 12.5 oz)   01/24/23 44.2 kg (97 lb 7.1 oz)   01/13/23 52.8 kg (116 lb 6.5 oz)       Medications:  Scheduled Meds:   aluminum-magnesium hydroxide-simethicone  30 mL Oral Once    amLODIPine  10 mg Oral Daily    carvediloL  25 mg Oral BID    ferrous gluconate  324 mg Oral Q48H    FLUoxetine  20 mg Oral Daily    furosemide  40 mg Oral BID    gabapentin  100 mg Oral BID    hydrALAZINE  100 mg Oral Q8H    hydroCHLOROthiazide  12.5 mg Oral Daily    isosorbide mononitrate  120 mg Oral Daily    levETIRAcetam  500 mg Oral BID    losartan  100 mg Oral Daily    mirtazapine  15 mg Oral QHS    pantoprazole  40 mg Oral Daily    senna-docusate 8.6-50 mg  1 tablet Oral BID     Continuous Infusions:  PRN Meds:.acetaminophen, dextrose 10%, dextrose 10%, glucagon (human recombinant), glucose, glucose, insulin aspart U-100, ondansetron, prochlorperazine, promethazine (PHENERGAN) IVPB, promethazine, sodium chloride 0.9%  No current facility-administered medications on file prior to encounter.     Current Outpatient Medications on File Prior to Encounter   Medication Sig Dispense Refill    albuterol (PROVENTIL/VENTOLIN HFA) 90 mcg/actuation inhaler Inhale 2 puffs into the lungs every 6 (six) hours as needed for Wheezing. Rescue 18 g 3    amLODIPine (NORVASC) 10 MG tablet Take 1 tablet (10 mg total) by mouth once daily. 30 tablet 11    carvediloL (COREG) 25 MG tablet Take 1 tablet (25 mg total) by mouth 2 (two) times daily. 60 tablet 2    cloNIDine 0.2 mg/24 hr td ptwk (CATAPRES) 0.2 mg/24 hr Place 1 patch onto the skin every 7 days. 4 patch 2    ferrous sulfate 325 (65 FE) MG EC tablet Take 325 mg by mouth once daily.    "   FLUoxetine 20 MG capsule Take 1 capsule (20 mg total) by mouth once daily. 30 capsule 11    furosemide (LASIX) 40 MG tablet Take 40 mg by mouth 2 (two) times a day.      gabapentin (NEURONTIN) 100 MG capsule Take 1 capsule (100 mg total) by mouth 2 (two) times daily. 90 capsule 2    hydrALAZINE (APRESOLINE) 100 MG tablet Take 1 tablet (100 mg total) by mouth every 8 (eight) hours. 90 tablet 2    insulin (LANTUS SOLOSTAR U-100 INSULIN) glargine 100 units/mL SubQ pen Inject 16 Units into the skin every evening.      insulin aspart U-100 (NOVOLOG) 100 unit/mL (3 mL) InPn pen Inject 1-10 Units into the skin before meals and at bedtime as needed (Hyperglycemia). Max daily dose 40 units. 3 mL 6    isosorbide mononitrate (IMDUR) 60 MG 24 hr tablet Take 2 tablets (120 mg total) by mouth once daily. 30 tablet 11    levETIRAcetam (KEPPRA) 500 MG Tab Take 1 tablet (500 mg total) by mouth 2 (two) times daily. 60 tablet 11    losartan-hydrochlorothiazide 100-12.5 mg (HYZAAR) 100-12.5 mg Tab Take 1 tablet by mouth once daily.      mirtazapine (REMERON SOL-TAB) 15 MG disintegrating tablet Take 1 tablet (15 mg total) by mouth nightly. 90 tablet 0    ondansetron (ZOFRAN) 4 MG tablet Take 1 tablet (4 mg total) by mouth every 8 (eight) hours as needed for Nausea. 60 tablet 2    pantoprazole (PROTONIX) 40 MG tablet Take 40 mg by mouth once daily.      pen needle, diabetic (BD ULTRA-FINE MAGALIE PEN NEEDLE) 32 gauge x 5/32" Ndle Inject into the skin 5 times per day with insulin 100 each 12    [DISCONTINUED] atenoloL (TENORMIN) 50 MG tablet Take 1 tablet (50 mg total) by mouth every other day. 45 tablet 3    [DISCONTINUED] omeprazole (PRILOSEC) 20 MG capsule Take 2 capsules (40 mg total) by mouth once daily. for 10 days 20 capsule 0    [DISCONTINUED] torsemide (DEMADEX) 20 MG Tab Take 1 tablet (20 mg total) by mouth 2 (two) times a day. (Patient taking differently: Take 20 mg by mouth once daily.) 60 tablet 1       Review of " Systems:  Neg    Physical Exam:  Constitutional: nad, aao x 3  Heart: rrr, no m/r/g, wwp, no edema  Lungs: ctab, no w/r/r/c, no lb  Abdomen: s/nt/nd, +BS    Results:  Lab Results   Component Value Date     01/27/2023    K 4.1 01/27/2023     01/27/2023    CO2 25 01/27/2023    BUN 26 (H) 01/27/2023    CREATININE 4.1 (H) 01/27/2023    CALCIUM 8.9 01/27/2023    ANIONGAP 9 01/27/2023    ESTGFRAFRICA 20 (A) 07/31/2022    EGFRNONAA 18 (A) 07/31/2022       Lab Results   Component Value Date    CALCIUM 8.9 01/27/2023    PHOS 4.4 01/27/2023       Recent Labs   Lab 01/27/23  0450   WBC 5.96   RBC 3.49*   HGB 9.9*   HCT 29.3*   PLT 79*   MCV 84   MCH 28.4   MCHC 33.8         I have personally reviewed pertinent radiological imaging and reports.    I have spent > 35 minutes providing care for this patient for the above diagnoses. These services have included chart/data/imaging review, evaluation, exam, formulation of plan, , note preparation, and discussions with staff involved in this patient's care.    Prateek Clark MD MPH  [facility]  [address] [place] [address], [place] [address]  [phone] (p)  [phone] (f)

## 2023-01-27 NOTE — HOSPITAL COURSE
Tabby Howard is a 33 year old female with a past medical history of ESRD, presumed gastroparesis, DM, HFpEF, GERD, HTN, MDD, seizure disorder and anemia who presented with hypoglycemia, nausea and vomiting. The patient was treated supportively with D10 infusion and with PRN antiemetics. She was able to be weaned from the dextrose infusion and was able to tolerate PO. Her symptoms improved throughout her course. She was discharged 1/27/2023 and she will follow up with her PCP in the outpatient setting.

## 2023-01-28 NOTE — DISCHARGE SUMMARY
"[facility]  Discharge Summary  Patient Name: Tabby Howard MRN: [id number]   Patient Class: OP- Observation  Length of Stay: 0   Admission Date: 1/5/2023 10:17 AM Attending Physician: Isai Grady MD   Primary Care Provider: [facility] Face-to-Face encounter date: 1/7/23   Chief Complaint: Abdominal Pain and N/V    Date of Discharge: 1/7/23  Discharge Disposition:Home or Self Care   Condition: Stable       Reason for Hospitalization   There are no active hospital problems to display for this patient.        Brief History of Present Illness    Tabby Howard is a 33 y.o.  female who  has a past medical history of CKD (chronic kidney disease), stage IV (4/12/2022), Diabetes mellitus due to underlying condition with unspecified complications (4/12/2022), Gastroparesis (4/12/2022), Heart failure with preserved ejection fraction (4/12/2022), History of gastroesophageal reflux (GERD), History of supraventricular tachycardia, Hyperkalemia (7/7/2022), Hypertensive emergency (7/8/2022), Sickle cell trait (4/12/2022), and Type 2 diabetes mellitus.. The patient presented to [facility] on 1/5/2023 with a primary complaint of Abdominal Pain and N/V      For the full HPI please refer to the History & Physical from this admission.    Hospital Course By Problem with Pertinent Findings     ESRD  Abdominal pain  Hyperglycemia  Nephro consult and plan for HD   ABD CT no acute pathology to explain abd pain  Dilaudid IV given in ER  Norco 7.5 mg ordered PRN  SSI with AC/HS accuchecks  Discharge following HD and prbc transfusion    Patient was seen and examined on the date of discharge and determined to be suitable for discharge.    Physical Exam  BP (!) 144/92   Pulse 82   Temp 98 °F (36.7 °C) (Oral)   Resp 19   Ht 5' 2" (1.575 m)   Wt 48.6 kg (107 lb 2.3 oz)   LMP  (LMP Unknown) Comment: Does not menstrate, NO PERIOD SINCE 12/2021  SpO2 100%   " Breastfeeding No   BMI 19.60 kg/m²   Vitals reviewed.  General appearance: no distress, sleeping  Skin: No Rash.   Neuro: drowsy, moves all extremities  HENT: Atraumatic head. Moist mucous membranes of oral cavity.   Lungs: Clear to auscultation bilaterally. No wheezing present.   Heart: Regular rate and rhythm. S1 and S2 present with no murmurs/gallop/rub. No pedal edema. No JVD present.   Abdomen: Soft, non-distended,   Extremities: No cyanosis, clubbing.    Following labs were Reviewed   Recent Labs   Lab 01/27/23  0450   WBC 5.96   HGB 9.9*   HCT 29.3*   PLT 79*   CALCIUM 8.9   ALBUMIN 3.1*   PROT 6.5      K 4.1   CO2 25      BUN 26*   CREATININE 4.1*   ALKPHOS 154*   ALT 16   AST 21   BILITOT 1.5*     POCT Glucose   Date Value Ref Range Status   01/27/2023 305 (H) 70 - 110 mg/dL Final   01/27/2023 260 (H) 70 - 110 mg/dL Final   01/26/2023 241 (H) 70 - 110 mg/dL Final   01/26/2023 192 (H) 70 - 110 mg/dL Final   01/26/2023 203 (H) 70 - 110 mg/dL Final   01/26/2023 116 (H) 70 - 110 mg/dL Final   01/26/2023 82 70 - 110 mg/dL Final   01/25/2023 70 70 - 110 mg/dL Final   01/25/2023 75 70 - 110 mg/dL Final   01/25/2023 101 70 - 110 mg/dL Final   01/25/2023 48 (LL) 70 - 110 mg/dL Final   01/25/2023 42 (LL) 70 - 110 mg/dL Final   01/25/2023 88 70 - 110 mg/dL Final   01/25/2023 91 70 - 110 mg/dL Final   01/25/2023 48 (LL) 70 - 110 mg/dL Final   01/25/2023 50 (LL) 70 - 110 mg/dL Final        All labs within the past 24 hours have been reviewed    Microbiology Results (last 7 days)       ** No results found for the last 168 hours. **          CT Abdomen Pelvis  Without Contrast   Final Result          No results found for this or any previous visit.      Consultants and Procedures   Consultants:  Consults (From admission, onward)          Status Ordering Provider     Inpatient consult to Nephrology  Once        Provider:  MD Lidia Garcia CHRISTOPHER A.            Procedures:   * No  "surgery found *     Discharge Information:   Diet:  Resume Cardiac diet/Diabetic Diet    Physical Activity:  Activity as tolerated    Instructions:  1. Take all medications as prescribed  2. Keep all follow-up appointments  3. Return to the hospital or call your primary care physicians if any worsening symptoms such as chest pain, shortness of breath, bleeding,  intractable pain, fever >101 occur.      Follow-Up Appointments:  Please call your primary care physician to schedule an appointment in 1 week time.     Follow-up Information       [facility] Follow up in 1 week(s).    Contact information:  [address] MURRY [id number] [phone]                               Pending laboratory work/Tests to be performed/followed by the Primary care Physician:    The patient was discharged with discharge instructions reviewed in written and verbal form. All questions were answered and prescriptions were provided. The importance of making follow up appointments and compliance of medications has been emphasized. The patient will follow up in 1 week or sooner as needed with the PCP. Tthe patient understands and agrees with the plan. Upon discharge, patient needs to be on following medications.    Discharge Medications:     Medication List        CONTINUE taking these medications      albuterol 90 mcg/actuation inhaler  Commonly known as: PROVENTIL/VENTOLIN HFA  Inhale 2 puffs into the lungs every 6 (six) hours as needed for Wheezing. Rescue     amLODIPine 10 MG tablet  Commonly known as: NORVASC  Take 1 tablet (10 mg total) by mouth once daily.     BD ULTRA-FINE MAGALIE PEN NEEDLE 32 gauge x 5/32" Ndle  Generic drug: pen needle, diabetic  Inject into the skin 5 times per day with insulin     carvediloL 25 MG tablet  Commonly known as: COREG  Take 1 tablet (25 mg total) by mouth 2 (two) times daily.     cloNIDine 0.2 mg/24 hr td ptwk 0.2 mg/24 hr  Commonly known as: CATAPRES  Place 1 " patch onto the skin every 7 days.     dicyclomine 10 MG capsule  Commonly known as: BENTYL  Take 1 capsule (10 mg total) by mouth 3 (three) times daily.     ferrous sulfate 325 (65 FE) MG EC tablet     FLUoxetine 20 MG capsule  Take 1 capsule (20 mg total) by mouth once daily.     furosemide 40 MG tablet  Commonly known as: LASIX     gabapentin 100 MG capsule  Commonly known as: NEURONTIN  Take 1 capsule (100 mg total) by mouth 2 (two) times daily.     hydrALAZINE 100 MG tablet  Commonly known as: APRESOLINE  Take 1 tablet (100 mg total) by mouth every 8 (eight) hours.     isosorbide mononitrate 60 MG 24 hr tablet  Commonly known as: IMDUR  Take 2 tablets (120 mg total) by mouth once daily.     levETIRAcetam 500 MG Tab  Commonly known as: KEPPRA  Take 1 tablet (500 mg total) by mouth 2 (two) times daily.     losartan-hydrochlorothiazide 100-12.5 mg 100-12.5 mg Tab  Commonly known as: HYZAAR     mirtazapine 15 MG disintegrating tablet  Commonly known as: REMERON SOL-TAB  Take 1 tablet (15 mg total) by mouth nightly.     NovoLOG FlexPen U-100 Insulin 100 unit/mL (3 mL) Inpn pen  Generic drug: insulin aspart U-100  Inject 1-10 Units into the skin before meals and at bedtime as needed (Hyperglycemia). Max daily dose 40 units.     ondansetron 4 MG tablet  Commonly known as: ZOFRAN  Take 1 tablet (4 mg total) by mouth every 8 (eight) hours as needed for Nausea.     pantoprazole 40 MG tablet  Commonly known as: PROTONIX            ASK your doctor about these medications      promethazine 25 MG tablet  Commonly known as: PHENERGAN  Take 1 tablet (25 mg total) by mouth every 12 (twelve) hours as needed (nausea/vomiting not relieved with ondansetron (zofran)).  Ask about: Should I take this medication?     traMADoL 50 mg tablet  Commonly known as: ULTRAM  Take 1 tablet (50 mg total) by mouth every 8 (eight) hours as needed for Pain.  Ask about: Should I take this medication?                I spent 25 minutes preparing the  discharge for this patient including reviewing records from previous encounters, preparation of discharge summary, assessing and final examination of the patient, discharge medicine reconciliation, discussing plan of care, follow up and education and prescriptions.       Isai Grady  [facility]

## 2023-01-30 ENCOUNTER — TELEPHONE (OUTPATIENT)
Dept: MEDSURG UNIT | Facility: HOSPITAL | Age: 33
End: 2023-01-30
Payer: MEDICAID

## 2023-02-02 ENCOUNTER — PATIENT MESSAGE (OUTPATIENT)
Dept: OPHTHALMOLOGY | Facility: CLINIC | Age: 33
End: 2023-02-02
Payer: MEDICAID

## 2023-02-03 ENCOUNTER — TELEPHONE (OUTPATIENT)
Dept: OPHTHALMOLOGY | Facility: CLINIC | Age: 33
End: 2023-02-03
Payer: MEDICAID

## 2023-02-03 LAB
6MAM SERPLBLD-MCNC: NEGATIVE NG/ML
AMPHETAMINES SERPL QL SCN: NEGATIVE NG/ML
BARBITURATES SERPL QL SCN: NEGATIVE UG/ML
BENZODIAZ SERPL QL SCN: NEGATIVE NG/ML
BENZODIAZ SERPL QL SCN: NEGATIVE NG/ML
CANNABINOIDS SERPL QL SCN: NEGATIVE NG/ML
CODEINE SERPLBLD CFM-MCNC: NEGATIVE NG/ML
DHC SERPLBLD CFM-MCNC: NEGATIVE NG/ML
HYDROCODONE SERPLBLD CFM-MCNC: NEGATIVE NG/ML
HYDROMORPHONE SERPLBLD CFM-MCNC: 2 NG/ML
METHADONE SERPL QL SCN: NEGATIVE NG/ML
MORPHINE SERPLBLD-MCNC: NEGATIVE NG/ML
OPIATES SERPL QL SCN: ABNORMAL NG/ML
OPIATES SPEC QL: POSITIVE
OXYCODONE SERPLBLD CFM-MCNC: NEGATIVE NG/ML
OXYCODONE+OXYMORPHONE SERPLBLD QL SCN: NEGATIVE NG/ML
OXYCODONES CONFIRMATION: NEGATIVE
OXYMORPHONE SERPLBLD CFM-MCNC: NEGATIVE NG/ML
PCP SERPL QL SCN: NEGATIVE NG/ML
PROPOXYPH SERPL QL SCN: NEGATIVE NG/ML

## 2023-02-03 NOTE — TELEPHONE ENCOUNTER
Spoke with pt was unable to make appt on today, she go to dialysis on Tuesday, Thursday, and Saturdays. Appt has been schedule for 2/10/2023 at 10:00 AM.

## 2023-02-05 ENCOUNTER — HOSPITAL ENCOUNTER (EMERGENCY)
Facility: HOSPITAL | Age: 33
Discharge: HOME | End: 2023-02-05
Attending: EMERGENCY MEDICINE
Payer: MEDICAID

## 2023-02-05 VITALS
SYSTOLIC BLOOD PRESSURE: 136 MMHG | TEMPERATURE: 98 F | BODY MASS INDEX: 24.29 KG/M2 | HEIGHT: 62 IN | RESPIRATION RATE: 13 BRPM | DIASTOLIC BLOOD PRESSURE: 94 MMHG | OXYGEN SATURATION: 99 % | WEIGHT: 132 LBS | HEART RATE: 90 BPM

## 2023-02-05 DIAGNOSIS — R10.9 ABDOMINAL PAIN, UNSPECIFIED ABDOMINAL LOCATION: Primary | ICD-10-CM

## 2023-02-05 DIAGNOSIS — K31.84 GASTROPARESIS: ICD-10-CM

## 2023-02-05 LAB
ALBUMIN SERPL BCP-MCNC: 3.5 G/DL (ref 3.5–5.2)
ALP SERPL-CCNC: 159 U/L (ref 55–135)
ALT SERPL W/O P-5'-P-CCNC: 18 U/L (ref 10–44)
ANION GAP SERPL CALC-SCNC: 18 MMOL/L (ref 8–16)
AST SERPL-CCNC: 26 U/L (ref 10–40)
B-HCG UR QL: NEGATIVE
BACTERIA #/AREA URNS HPF: ABNORMAL /HPF
BASOPHILS # BLD AUTO: 0.03 K/UL (ref 0–0.2)
BASOPHILS NFR BLD: 0.5 % (ref 0–1.9)
BILIRUB SERPL-MCNC: 3.2 MG/DL (ref 0.1–1)
BILIRUB UR QL STRIP: ABNORMAL
BUN SERPL-MCNC: 12 MG/DL (ref 6–20)
CALCIUM SERPL-MCNC: 9.1 MG/DL (ref 8.7–10.5)
CHLORIDE SERPL-SCNC: 94 MMOL/L (ref 95–110)
CLARITY UR: CLEAR
CO2 SERPL-SCNC: 24 MMOL/L (ref 23–29)
COLOR UR: YELLOW
CREAT SERPL-MCNC: 2.5 MG/DL (ref 0.5–1.4)
DIFFERENTIAL METHOD: ABNORMAL
EOSINOPHIL # BLD AUTO: 0.1 K/UL (ref 0–0.5)
EOSINOPHIL NFR BLD: 0.9 % (ref 0–8)
ERYTHROCYTE [DISTWIDTH] IN BLOOD BY AUTOMATED COUNT: 16.1 % (ref 11.5–14.5)
EST. GFR  (NO RACE VARIABLE): 25.2 ML/MIN/1.73 M^2
GLUCOSE SERPL-MCNC: 513 MG/DL (ref 70–110)
GLUCOSE UR QL STRIP: ABNORMAL
HCT VFR BLD AUTO: 28 % (ref 37–48.5)
HGB BLD-MCNC: 10.1 G/DL (ref 12–16)
HGB UR QL STRIP: ABNORMAL
HYALINE CASTS #/AREA URNS LPF: 2 /LPF
IMM GRANULOCYTES # BLD AUTO: 0.02 K/UL (ref 0–0.04)
IMM GRANULOCYTES NFR BLD AUTO: 0.4 % (ref 0–0.5)
KETONES UR QL STRIP: ABNORMAL
LACTATE SERPL-SCNC: 1.2 MMOL/L (ref 0.5–2.2)
LEUKOCYTE ESTERASE UR QL STRIP: NEGATIVE
LIPASE SERPL-CCNC: 32 U/L (ref 4–60)
LYMPHOCYTES # BLD AUTO: 0.7 K/UL (ref 1–4.8)
LYMPHOCYTES NFR BLD: 11.7 % (ref 18–48)
MCH RBC QN AUTO: 28.5 PG (ref 27–31)
MCHC RBC AUTO-ENTMCNC: 36.1 G/DL (ref 32–36)
MCV RBC AUTO: 79 FL (ref 82–98)
MICROSCOPIC COMMENT: ABNORMAL
MONOCYTES # BLD AUTO: 0.6 K/UL (ref 0.3–1)
MONOCYTES NFR BLD: 9.8 % (ref 4–15)
NEUTROPHILS # BLD AUTO: 4.3 K/UL (ref 1.8–7.7)
NEUTROPHILS NFR BLD: 76.7 % (ref 38–73)
NITRITE UR QL STRIP: NEGATIVE
NRBC BLD-RTO: 0 /100 WBC
PH UR STRIP: >8 [PH] (ref 5–8)
PLATELET # BLD AUTO: 71 K/UL (ref 150–450)
PMV BLD AUTO: 9.4 FL (ref 9.2–12.9)
POCT GLUCOSE: 393 MG/DL (ref 70–110)
POTASSIUM SERPL-SCNC: 3.6 MMOL/L (ref 3.5–5.1)
PROT SERPL-MCNC: 7.7 G/DL (ref 6–8.4)
PROT UR QL STRIP: ABNORMAL
RBC # BLD AUTO: 3.55 M/UL (ref 4–5.4)
RBC #/AREA URNS HPF: 15 /HPF (ref 0–4)
SODIUM SERPL-SCNC: 136 MMOL/L (ref 136–145)
SP GR UR STRIP: 1.02 (ref 1–1.03)
SQUAMOUS #/AREA URNS HPF: 2 /HPF
URN SPEC COLLECT METH UR: ABNORMAL
UROBILINOGEN UR STRIP-ACNC: NEGATIVE EU/DL
WBC # BLD AUTO: 5.64 K/UL (ref 3.9–12.7)
WBC #/AREA URNS HPF: 1 /HPF (ref 0–5)

## 2023-02-05 PROCEDURE — 80053 COMPREHEN METABOLIC PANEL: CPT | Performed by: EMERGENCY MEDICINE

## 2023-02-05 PROCEDURE — 85025 COMPLETE CBC W/AUTO DIFF WBC: CPT | Performed by: EMERGENCY MEDICINE

## 2023-02-05 PROCEDURE — 74174 CTA ABDOMEN AND PELVIS: ICD-10-PCS | Mod: 26,,, | Performed by: RADIOLOGY

## 2023-02-05 PROCEDURE — 81000 URINALYSIS NONAUTO W/SCOPE: CPT | Performed by: EMERGENCY MEDICINE

## 2023-02-05 PROCEDURE — 25000003 PHARM REV CODE 250: Performed by: EMERGENCY MEDICINE

## 2023-02-05 PROCEDURE — 81025 URINE PREGNANCY TEST: CPT | Performed by: EMERGENCY MEDICINE

## 2023-02-05 PROCEDURE — 82962 GLUCOSE BLOOD TEST: CPT

## 2023-02-05 PROCEDURE — 83690 ASSAY OF LIPASE: CPT | Performed by: EMERGENCY MEDICINE

## 2023-02-05 PROCEDURE — 74174 CTA ABD&PLVS W/CONTRAST: CPT | Mod: 26,,, | Performed by: RADIOLOGY

## 2023-02-05 PROCEDURE — 74174 CTA ABD&PLVS W/CONTRAST: CPT | Mod: TC

## 2023-02-05 PROCEDURE — 96375 TX/PRO/DX INJ NEW DRUG ADDON: CPT

## 2023-02-05 PROCEDURE — 96376 TX/PRO/DX INJ SAME DRUG ADON: CPT | Mod: 59

## 2023-02-05 PROCEDURE — S0166 INJ OLANZAPINE 2.5MG: HCPCS | Performed by: EMERGENCY MEDICINE

## 2023-02-05 PROCEDURE — 63600175 PHARM REV CODE 636 W HCPCS: Performed by: EMERGENCY MEDICINE

## 2023-02-05 PROCEDURE — 96372 THER/PROPH/DIAG INJ SC/IM: CPT | Mod: 59 | Performed by: EMERGENCY MEDICINE

## 2023-02-05 PROCEDURE — 83605 ASSAY OF LACTIC ACID: CPT | Performed by: EMERGENCY MEDICINE

## 2023-02-05 PROCEDURE — 99285 EMERGENCY DEPT VISIT HI MDM: CPT | Mod: 25

## 2023-02-05 PROCEDURE — 96374 THER/PROPH/DIAG INJ IV PUSH: CPT

## 2023-02-05 RX ORDER — OLANZAPINE 10 MG/2ML
10 INJECTION, POWDER, FOR SOLUTION INTRAMUSCULAR ONCE AS NEEDED
Status: COMPLETED | OUTPATIENT
Start: 2023-02-05 | End: 2023-02-05

## 2023-02-05 RX ORDER — HYDRALAZINE HYDROCHLORIDE 20 MG/ML
10 INJECTION INTRAMUSCULAR; INTRAVENOUS
Status: COMPLETED | OUTPATIENT
Start: 2023-02-05 | End: 2023-02-05

## 2023-02-05 RX ORDER — HYDROMORPHONE HYDROCHLORIDE 4 MG/1
4 TABLET ORAL EVERY 12 HOURS PRN
Status: ON HOLD | COMMUNITY
End: 2023-03-21 | Stop reason: HOSPADM

## 2023-02-05 RX ORDER — HYDROCODONE BITARTRATE AND ACETAMINOPHEN 10; 325 MG/1; MG/1
1 TABLET ORAL EVERY 4 HOURS PRN
Status: ON HOLD | COMMUNITY
End: 2023-03-13 | Stop reason: HOSPADM

## 2023-02-05 RX ORDER — HYDROMORPHONE HYDROCHLORIDE 2 MG/ML
1 INJECTION, SOLUTION INTRAMUSCULAR; INTRAVENOUS; SUBCUTANEOUS
Status: COMPLETED | OUTPATIENT
Start: 2023-02-05 | End: 2023-02-05

## 2023-02-05 RX ORDER — FAMOTIDINE 10 MG/ML
20 INJECTION INTRAVENOUS
Status: COMPLETED | OUTPATIENT
Start: 2023-02-05 | End: 2023-02-05

## 2023-02-05 RX ORDER — PROMETHAZINE HYDROCHLORIDE 25 MG/1
25 SUPPOSITORY RECTAL EVERY 6 HOURS PRN
Qty: 10 SUPPOSITORY | Refills: 0 | Status: ON HOLD | OUTPATIENT
Start: 2023-02-05 | End: 2023-03-13 | Stop reason: HOSPADM

## 2023-02-05 RX ORDER — CLONIDINE HYDROCHLORIDE 0.1 MG/1
0.1 TABLET ORAL
Status: COMPLETED | OUTPATIENT
Start: 2023-02-05 | End: 2023-02-05

## 2023-02-05 RX ORDER — LABETALOL HYDROCHLORIDE 5 MG/ML
10 INJECTION, SOLUTION INTRAVENOUS
Status: COMPLETED | OUTPATIENT
Start: 2023-02-05 | End: 2023-02-05

## 2023-02-05 RX ORDER — ONDANSETRON 2 MG/ML
4 INJECTION INTRAMUSCULAR; INTRAVENOUS
Status: COMPLETED | OUTPATIENT
Start: 2023-02-05 | End: 2023-02-05

## 2023-02-05 RX ORDER — METOCLOPRAMIDE HYDROCHLORIDE 5 MG/ML
10 INJECTION INTRAMUSCULAR; INTRAVENOUS
Status: COMPLETED | OUTPATIENT
Start: 2023-02-05 | End: 2023-02-05

## 2023-02-05 RX ADMIN — FAMOTIDINE 20 MG: 10 INJECTION, SOLUTION INTRAVENOUS at 02:02

## 2023-02-05 RX ADMIN — LABETALOL HYDROCHLORIDE 10 MG: 5 INJECTION, SOLUTION INTRAVENOUS at 01:02

## 2023-02-05 RX ADMIN — ONDANSETRON HYDROCHLORIDE 4 MG: 2 SOLUTION INTRAMUSCULAR; INTRAVENOUS at 01:02

## 2023-02-05 RX ADMIN — METOCLOPRAMIDE 10 MG: 5 INJECTION, SOLUTION INTRAMUSCULAR; INTRAVENOUS at 02:02

## 2023-02-05 RX ADMIN — INSULIN HUMAN 10 UNITS: 100 INJECTION, SOLUTION PARENTERAL at 02:02

## 2023-02-05 RX ADMIN — HYDROMORPHONE HYDROCHLORIDE 1 MG: 2 INJECTION, SOLUTION INTRAMUSCULAR; INTRAVENOUS; SUBCUTANEOUS at 01:02

## 2023-02-05 RX ADMIN — CLONIDINE HYDROCHLORIDE 0.1 MG: 0.1 TABLET ORAL at 02:02

## 2023-02-05 RX ADMIN — HYDRALAZINE HYDROCHLORIDE 10 MG: 20 INJECTION INTRAMUSCULAR; INTRAVENOUS at 02:02

## 2023-02-05 RX ADMIN — OLANZAPINE 10 MG: 10 INJECTION, POWDER, LYOPHILIZED, FOR SOLUTION INTRAMUSCULAR at 02:02

## 2023-02-05 NOTE — ED NOTES
"Patient awakened and iv d/c'd. Jelco intact. Pressure held x 1 minute and gauze applied with 2x2 and coban. Instructions for removal explained. Vitally stable. Normotensive at this time. Denies any c/o other than being "sleepy". All symptoms have resolved. Vitally stable. Oriented x 4. Patient assisted to vehicle by wheelchair and further instructions given to father for follow up. RX provided with instructions for use and to return for new or worsening symptoms.   "

## 2023-02-05 NOTE — DISCHARGE INSTRUCTIONS
Drink plenty of fluids. No heavy meals until symptoms resolve. See your doctor Monday after dialysis. Use Tums and/or Maalox as needed.

## 2023-02-05 NOTE — ED NOTES
Spoke with father at this time concerning transportation. [facility] will arrive shortly to pick patient up to take home. Patient sleeping at this time.

## 2023-02-05 NOTE — ED NOTES
Returned from CT scan at this time via stretcher. Continues to move around and waling in pain, holding abdomen.

## 2023-02-05 NOTE — ED NOTES
Patient presents to ER via [facility] with c/o severe abdominal pain and hypertension. States pain started approximately 14 hr prior to arrival while at dialysis. States she let the staff know and was only given tylenol with no relief from pain. States received dialysis in [place], [place] and next treatment is Tuesday. [facility] states patient was able to ambulate to to stretcher. Patient is very animated with body language and moving all over stretcher and holding lower abdomen. MD at bedside.

## 2023-02-05 NOTE — ED NOTES
In and out catheter performed after discussing with Dr. Parra. Approximately 75ml noted and bladder emptied. Tolerated well without incident.

## 2023-02-05 NOTE — ED NOTES
Patient vomiting violently due to nausea and hx of reported gastroparesis. Patient medicated per order.

## 2023-02-05 NOTE — ED NOTES
Patient rolling around in bed and removed both iv jelcos. Bleeding controlled. Dressing applied with 2x2 gauze and coban. Will attempt to place IV catheter at this time. Remains hypertensive. No visible or subjective relief of pain per patient.

## 2023-02-05 NOTE — ED NOTES
MD in with patient to discuss findings. Continues to c/o pain without resolve. Hypertension meds given. Tolerated well.   Continues very anxious, animated in behavior.

## 2023-02-05 NOTE — ED PROVIDER NOTES
Encounter Date: 2/5/2023       History     Chief Complaint   Patient presents with    Abdominal Pain     Pt reports severe abdominal pain since 3 pm yesterday after dialysis     Pt with hx of ESRD on HD MWF, DM, HTN, gastroparesis and GERD here with severe diffuse LAP and LBP onset yesterday after dialysis around 1500. Pain worse today. It does not radiate down her legs. No BBI/SA. Pain 10/10. No hx of same. No vaginal or urinary sxs. She is anuric.     The history is provided by the patient and the EMS personnel.   Abdominal Pain  The current episode started yesterday. The onset of the illness was abrupt. The problem has been rapidly worsening. The abdominal pain is located in the RLQ, LLQ and suprapubic region. The abdominal pain radiates to the back. The severity of the abdominal pain is 10/10. The abdominal pain is relieved by nothing. The other symptoms of the illness do not include fever, fatigue, jaundice, melena, nausea, vomiting, diarrhea, dysuria, hematemesis, hematochezia, vaginal discharge or vaginal bleeding.   The patient states that she believes she is currently not pregnant. The patient has not had a change in bowel habit. Additional symptoms associated with the illness include back pain. Symptoms associated with the illness do not include chills, anorexia, diaphoresis, heartburn, constipation, urgency, hematuria or frequency.   Review of patient's allergies indicates:   Allergen Reactions    Penicillins Hives     Past Medical History:   Diagnosis Date    CKD (chronic kidney disease), stage IV 4/12/2022    Diabetes mellitus due to underlying condition with unspecified complications 4/12/2022    Gastroparesis 4/12/2022    Heart failure with preserved ejection fraction 4/12/2022    EF 55% on 3/22    History of gastroesophageal reflux (GERD)     History of supraventricular tachycardia     Hyperkalemia 7/7/2022    Hypertensive emergency 7/8/2022    Sickle cell trait 4/12/2022    Type 2 diabetes mellitus       Past Surgical History:   Procedure Laterality Date     SECTION      x 3    COLONOSCOPY      COLONOSCOPY N/A 2022    Procedure: COLONOSCOPY;  Surgeon: Jagdeep Cedeno MD;  Location: [facility];  Service: Endoscopy;  Laterality: N/A;    ESOPHAGOGASTRODUODENOSCOPY N/A 10/18/2019    Procedure: ESOPHAGOGASTRODUODENOSCOPY (EGD);  Surgeon: Gianluca Mendez MD;  [facility];  Service: Endoscopy;  Laterality: N/A;    ESOPHAGOGASTRODUODENOSCOPY N/A 2022    Procedure: EGD (ESOPHAGOGASTRODUODENOSCOPY);  Surgeon: Micky Paredes III, MD;  Location: [facility];  Service: Endoscopy;  Laterality: N/A;    ESOPHAGOGASTRODUODENOSCOPY N/A 2022    Procedure: EGD (ESOPHAGOGASTRODUODENOSCOPY);  Surgeon: Marcelo Zhong MD;  Location: [facility];  Service: Endoscopy;  Laterality: N/A;    LAPAROSCOPIC CHOLECYSTECTOMY N/A 2022    Procedure: CHOLECYSTECTOMY, LAPAROSCOPIC;  Surgeon: Grey Perez MD;  Location: [facility];  Service: General;  Laterality: N/A;    PLACEMENT OF DUAL-LUMEN VASCULAR CATHETER Left 2022    Procedure: INSERTION-CATHETER-JOSEPH;  Surgeon: Dionte Gan MD;  Location: [facility] OR;  Service: General;  Laterality: Left;    PLACEMENT OF DUAL-LUMEN VASCULAR CATHETER Right 2022    Procedure: INSERTION-CATHETER-Hemosplit;  Surgeon: Dionte Gan MD;  Location: [facility] OR;  Service: General;  Laterality: Right;     Family History   Problem Relation Age of Onset    Diabetes Mother     Diabetes Father      Social History     Tobacco Use    Smoking status: Never    Smokeless tobacco: Never   Substance Use Topics    Alcohol use: Not Currently    Drug use: No     Review of Systems   Constitutional:  Negative for chills, diaphoresis, fatigue and fever.   Gastrointestinal:  Positive for abdominal pain. Negative for anorexia, constipation, diarrhea, heartburn, hematemesis, hematochezia, jaundice, melena, nausea and vomiting.   Genitourinary:  Negative for dysuria, frequency,  hematuria, urgency, vaginal bleeding and vaginal discharge.   Musculoskeletal:  Positive for back pain.   All other systems reviewed and are negative.    Physical Exam     Initial Vitals   BP Pulse Resp Temp SpO2   02/05/23 0054 02/05/23 0054 02/05/23 0054 02/05/23 0100 02/05/23 0054   (!) 223/148 94 (!) 22 97.6 °F (36.4 °C) 100 %      MAP       --                Physical Exam    Nursing note and vitals reviewed.  Constitutional: She appears well-developed and well-nourished. She is not diaphoretic. She appears distressed.   Pt crying and moaning in pain.   HENT:   Head: Normocephalic and atraumatic.   MM slightly dry. OP clear   Eyes: Conjunctivae and EOM are normal. No scleral icterus.   Neck: Neck supple. No JVD present.   Normal range of motion.  Cardiovascular:  Normal rate, regular rhythm, normal heart sounds and intact distal pulses.           Pulmonary/Chest: Breath sounds normal.   Abdominal: Abdomen is soft. Bowel sounds are normal. She exhibits no distension and no mass. There is abdominal tenderness.   No CVAT There is no rebound and no guarding.   Musculoskeletal:         General: No tenderness or edema. Normal range of motion.      Cervical back: Normal range of motion and neck supple.     Neurological: She is alert and oriented to person, place, and time. She has normal strength. No sensory deficit.   Skin: Skin is warm and dry. Capillary refill takes less than 2 seconds. No rash noted. No erythema. No pallor.       ED Course   Procedures  Labs Reviewed   CBC W/ AUTO DIFFERENTIAL - Abnormal; Notable for the following components:       Result Value    RBC 3.55 (*)     Hemoglobin 10.1 (*)     Hematocrit 28.0 (*)     MCV 79 (*)     MCHC 36.1 (*)     RDW 16.1 (*)     Platelets 71 (*)     Lymph # 0.7 (*)     Gran % 76.7 (*)     Lymph % 11.7 (*)     All other components within normal limits   COMPREHENSIVE METABOLIC PANEL - Abnormal; Notable for the following components:    Chloride 94 (*)     Glucose 513  (*)     Creatinine 2.5 (*)     Total Bilirubin 3.2 (*)     Alkaline Phosphatase 159 (*)     Anion Gap 18 (*)     eGFR 25.2 (*)     All other components within normal limits    Narrative:     glucose critical result(s) called and verbal readback obtained from   JES Wright RN by [facility] 02/05/2023 01:47   URINALYSIS, REFLEX TO URINE CULTURE - Abnormal; Notable for the following components:    pH, UA >8.0 (*)     Protein, UA 3+ (*)     Glucose, UA 2+ (*)     Ketones, UA 1+ (*)     Bilirubin (UA) 2+ (*)     Occult Blood UA 3+ (*)     All other components within normal limits    Narrative:     Preferred Collection Type->Urine, Clean Catch  Specimen Source->Urine   URINALYSIS MICROSCOPIC - Abnormal; Notable for the following components:    RBC, UA 15 (*)     Hyaline Casts, UA 2 (*)     All other components within normal limits    Narrative:     Preferred Collection Type->Urine, Clean Catch  Specimen Source->Urine   POCT GLUCOSE - Abnormal; Notable for the following components:    POCT Glucose 393 (*)     All other components within normal limits   LIPASE   LACTIC ACID, PLASMA   PREGNANCY TEST, URINE RAPID    Narrative:     Specimen Source->Urine   POCT GLUCOSE MONITORING CONTINUOUS          Imaging Results              CTA Abdomen and Pelvis (In process)                      Medications   iohexoL (OMNIPAQUE 350) injection 75 mL (has no administration in time range)   HYDROmorphone (PF) injection 1 mg (1 mg Intravenous Given 2/5/23 0105)   ondansetron injection 4 mg (4 mg Intravenous Given 2/5/23 0105)   HYDROmorphone (PF) injection 1 mg (1 mg Intravenous Given 2/5/23 0134)   labetaloL injection 10 mg (10 mg Intravenous Given 2/5/23 0137)   insulin regular injection 10 Units 0.1 mL (10 Units Intravenous Given 2/5/23 0224)   hydrALAZINE injection 10 mg (10 mg Intravenous Given 2/5/23 0239)   cloNIDine tablet 0.1 mg (0.1 mg Oral Given 2/5/23 0235)   OLANZapine injection 10 mg (10 mg Intramuscular Given 2/5/23 0245)    metoclopramide HCl injection 10 mg (10 mg Intravenous Given 2/5/23 0254)   famotidine (PF) injection 20 mg (20 mg Intravenous Given 2/5/23 0253)     Medical Decision Making:   Differential Diagnosis:   Pancreatitis, UTI, dysmenorrhea, renal colic, dissection, gastritis/gastroparesis, constipation  Clinical Tests:   Lab Tests: Ordered and Reviewed  Radiological Study: Ordered and Reviewed  ED Management:  Pt with ESRD on HD MWF, DM, HTN and gastroparesis presented with severe intractable abd pain. Her pain was out of proportion to her exam. She was given total of dilaudid 2mg IVP with minimal relief. Her BP was very elevated so CTA performed and neg for aortic injury. Only abd was constipation but this not causing pt's severe pain. CBC with normal WBC. CMP remarkable only for slightly elevated Cr and elevated blood sugar, no DKA. Lipase normal. UA without infection. UPT neg. She was given insulin 10u IV for her blood sugar and labetalol 10mg IVP, hydralazine 10mg IVP and clonidine 0.1mg PO. She had emesis shortly after the clonidine. Pt cont'd to writhe around in bed and pulled her Ivs in the process. She was given pepcid and zyprexa. This finally settled her down and she rested. I suspect pt's pain 2/2 gastroparesis/gastritis. Plan discharge home. She has dialysis Monday am. She was advised to f/u with her PCP Monday or Tuesday.            ED Course as of 02/05/23 0353   Sun Feb 05, 2023   0213 Pt still with moaning and crying despite 2mg dilaudid. CTA performed to r/o dissection given her pain level and benign exam and her elevated BP.  [DC]   0213 CT abd: IMPRESSION:  1. There is a moderate pericardial effusion, as before.  2. There is mild splenomegaly. Impression, as before.  3. As before, there is mild bilateral perinephric fat haziness.  4. There is wall thickening of the stomach, probably an artifact due to incomplete distention. Gastritis is less likely.  5. There is more than usual amount of stool in the  colon. [DC]   0241 Pt with severe intractable LAP. Abd exam remains benign. Labs unremarkable. Pt anxious and writhing in the bed. She pulled her Ivs. Zyprexa given. [DC]   0303 Now with vomiting. Gastroparesis causing this? Reglan given.  [DC]      ED Course User Index  [DC] Gianluca Parra Jr., MD                 Clinical Impression:   Final diagnoses:  [R10.9] Abdominal pain, unspecified abdominal location (Primary)  [K31.84] Gastroparesis        ED Disposition Condition    Discharge Stable          ED Prescriptions       Medication Sig Dispense Start Date End Date Auth. Provider    promethazine (PHENERGAN) 25 MG suppository Place 1 suppository (25 mg total) rectally every 6 (six) hours as needed for Nausea. 10 suppository 2/5/2023 -- Gianluca Parra Jr., MD          Follow-up Information       Follow up With Specialties Details Why Contact [facility]  In 2 days  [address] [facility] [address]  [phone]               Gianluca Parra Jr., MD  02/05/23 0351

## 2023-02-06 ENCOUNTER — HOSPITAL ENCOUNTER (EMERGENCY)
Facility: HOSPITAL | Age: 33
Discharge: HOME | End: 2023-02-06
Attending: EMERGENCY MEDICINE
Payer: MEDICAID

## 2023-02-06 VITALS
HEART RATE: 98 BPM | HEIGHT: 62 IN | TEMPERATURE: 98 F | OXYGEN SATURATION: 99 % | WEIGHT: 132 LBS | DIASTOLIC BLOOD PRESSURE: 110 MMHG | RESPIRATION RATE: 20 BRPM | BODY MASS INDEX: 24.29 KG/M2 | SYSTOLIC BLOOD PRESSURE: 187 MMHG

## 2023-02-06 DIAGNOSIS — Z99.2 ESRD (END STAGE RENAL DISEASE) ON DIALYSIS: ICD-10-CM

## 2023-02-06 DIAGNOSIS — N18.6 ESRD (END STAGE RENAL DISEASE) ON DIALYSIS: ICD-10-CM

## 2023-02-06 DIAGNOSIS — K31.84 GASTROPARESIS: Primary | ICD-10-CM

## 2023-02-06 DIAGNOSIS — D64.9 ANEMIA, UNSPECIFIED TYPE: ICD-10-CM

## 2023-02-06 DIAGNOSIS — R07.9 CHEST PAIN: ICD-10-CM

## 2023-02-06 LAB
ALBUMIN SERPL BCP-MCNC: 3.5 G/DL (ref 3.5–5.2)
ALP SERPL-CCNC: 136 U/L (ref 55–135)
ALT SERPL W/O P-5'-P-CCNC: 13 U/L (ref 10–44)
ANION GAP SERPL CALC-SCNC: 18 MMOL/L (ref 8–16)
AST SERPL-CCNC: 20 U/L (ref 10–40)
BASOPHILS # BLD AUTO: 0.04 K/UL (ref 0–0.2)
BASOPHILS NFR BLD: 0.6 % (ref 0–1.9)
BILIRUB SERPL-MCNC: 2.4 MG/DL (ref 0.1–1)
BNP SERPL-MCNC: 2392 PG/ML (ref 0–99)
BUN SERPL-MCNC: 43 MG/DL (ref 6–20)
CALCIUM SERPL-MCNC: 8.1 MG/DL (ref 8.7–10.5)
CHLORIDE SERPL-SCNC: 94 MMOL/L (ref 95–110)
CO2 SERPL-SCNC: 23 MMOL/L (ref 23–29)
CREAT SERPL-MCNC: 5.8 MG/DL (ref 0.5–1.4)
DIFFERENTIAL METHOD: ABNORMAL
EOSINOPHIL # BLD AUTO: 0 K/UL (ref 0–0.5)
EOSINOPHIL NFR BLD: 0.6 % (ref 0–8)
ERYTHROCYTE [DISTWIDTH] IN BLOOD BY AUTOMATED COUNT: 17.9 % (ref 11.5–14.5)
EST. GFR  (NO RACE VARIABLE): 9 ML/MIN/1.73 M^2
GLUCOSE SERPL-MCNC: 476 MG/DL (ref 70–110)
HCT VFR BLD AUTO: 21.2 % (ref 37–48.5)
HGB BLD-MCNC: 7.4 G/DL (ref 12–16)
IMM GRANULOCYTES # BLD AUTO: 0.05 K/UL (ref 0–0.04)
IMM GRANULOCYTES NFR BLD AUTO: 0.7 % (ref 0–0.5)
LYMPHOCYTES # BLD AUTO: 1 K/UL (ref 1–4.8)
LYMPHOCYTES NFR BLD: 14.9 % (ref 18–48)
MCH RBC QN AUTO: 28.5 PG (ref 27–31)
MCHC RBC AUTO-ENTMCNC: 34.9 G/DL (ref 32–36)
MCV RBC AUTO: 82 FL (ref 82–98)
MONOCYTES # BLD AUTO: 0.7 K/UL (ref 0.3–1)
MONOCYTES NFR BLD: 9.3 % (ref 4–15)
NEUTROPHILS # BLD AUTO: 5.1 K/UL (ref 1.8–7.7)
NEUTROPHILS NFR BLD: 73.9 % (ref 38–73)
NRBC BLD-RTO: 0 /100 WBC
PLATELET # BLD AUTO: 78 K/UL (ref 150–450)
PMV BLD AUTO: 9.8 FL (ref 9.2–12.9)
POTASSIUM SERPL-SCNC: 3.6 MMOL/L (ref 3.5–5.1)
PROT SERPL-MCNC: 7.1 G/DL (ref 6–8.4)
RBC # BLD AUTO: 2.6 M/UL (ref 4–5.4)
SODIUM SERPL-SCNC: 135 MMOL/L (ref 136–145)
TROPONIN I SERPL DL<=0.01 NG/ML-MCNC: 0.05 NG/ML (ref 0–0.03)
WBC # BLD AUTO: 6.96 K/UL (ref 3.9–12.7)

## 2023-02-06 PROCEDURE — 85025 COMPLETE CBC W/AUTO DIFF WBC: CPT | Performed by: PHYSICIAN ASSISTANT

## 2023-02-06 PROCEDURE — 84484 ASSAY OF TROPONIN QUANT: CPT | Performed by: PHYSICIAN ASSISTANT

## 2023-02-06 PROCEDURE — 83880 ASSAY OF NATRIURETIC PEPTIDE: CPT | Performed by: PHYSICIAN ASSISTANT

## 2023-02-06 PROCEDURE — 94761 N-INVAS EAR/PLS OXIMETRY MLT: CPT

## 2023-02-06 PROCEDURE — 99284 EMERGENCY DEPT VISIT MOD MDM: CPT | Mod: 25

## 2023-02-06 PROCEDURE — 93010 ELECTROCARDIOGRAM REPORT: CPT | Mod: ,,, | Performed by: INTERNAL MEDICINE

## 2023-02-06 PROCEDURE — 96372 THER/PROPH/DIAG INJ SC/IM: CPT | Performed by: EMERGENCY MEDICINE

## 2023-02-06 PROCEDURE — 80053 COMPREHEN METABOLIC PANEL: CPT | Performed by: PHYSICIAN ASSISTANT

## 2023-02-06 PROCEDURE — 63600175 PHARM REV CODE 636 W HCPCS: Performed by: EMERGENCY MEDICINE

## 2023-02-06 PROCEDURE — 93005 ELECTROCARDIOGRAM TRACING: CPT

## 2023-02-06 PROCEDURE — 93010 EKG 12-LEAD: ICD-10-PCS | Mod: ,,, | Performed by: INTERNAL MEDICINE

## 2023-02-06 RX ORDER — DIPHENHYDRAMINE HYDROCHLORIDE 50 MG/ML
25 INJECTION INTRAMUSCULAR; INTRAVENOUS
Status: COMPLETED | OUTPATIENT
Start: 2023-02-06 | End: 2023-02-06

## 2023-02-06 RX ORDER — METOCLOPRAMIDE HYDROCHLORIDE 5 MG/ML
10 INJECTION INTRAMUSCULAR; INTRAVENOUS
Status: COMPLETED | OUTPATIENT
Start: 2023-02-06 | End: 2023-02-06

## 2023-02-06 RX ADMIN — METOCLOPRAMIDE 10 MG: 5 INJECTION, SOLUTION INTRAMUSCULAR; INTRAVENOUS at 09:02

## 2023-02-06 RX ADMIN — DIPHENHYDRAMINE HYDROCHLORIDE 25 MG: 50 INJECTION, SOLUTION INTRAMUSCULAR; INTRAVENOUS at 09:02

## 2023-02-07 ENCOUNTER — HOSPITAL ENCOUNTER (OUTPATIENT)
Facility: HOSPITAL | Age: 33
Discharge: HOME | End: 2023-02-08
Attending: EMERGENCY MEDICINE | Admitting: HOSPITALIST
Payer: MEDICAID

## 2023-02-07 DIAGNOSIS — I10 UNCONTROLLED HYPERTENSION: Primary | ICD-10-CM

## 2023-02-07 DIAGNOSIS — E11.43 DIABETIC GASTROPARESIS: ICD-10-CM

## 2023-02-07 DIAGNOSIS — Z91.199 HISTORY OF NONCOMPLIANCE WITH MEDICAL TREATMENT: ICD-10-CM

## 2023-02-07 DIAGNOSIS — K31.84 DIABETIC GASTROPARESIS: ICD-10-CM

## 2023-02-07 DIAGNOSIS — R07.9 CHEST PAIN: ICD-10-CM

## 2023-02-07 DIAGNOSIS — Z79.4 TYPE 2 DIABETES MELLITUS WITH HYPERGLYCEMIA, WITH LONG-TERM CURRENT USE OF INSULIN: ICD-10-CM

## 2023-02-07 DIAGNOSIS — E11.10 DIABETIC KETOACIDOSIS WITHOUT COMA ASSOCIATED WITH TYPE 2 DIABETES MELLITUS: ICD-10-CM

## 2023-02-07 DIAGNOSIS — E11.65 TYPE 2 DIABETES MELLITUS WITH HYPERGLYCEMIA, WITH LONG-TERM CURRENT USE OF INSULIN: ICD-10-CM

## 2023-02-07 DIAGNOSIS — Z76.5 DRUG-SEEKING BEHAVIOR: ICD-10-CM

## 2023-02-07 PROBLEM — I24.89 DEMAND ISCHEMIA: Status: ACTIVE | Noted: 2023-02-07

## 2023-02-07 LAB
ALBUMIN SERPL BCP-MCNC: 3.4 G/DL (ref 3.5–5.2)
ALLENS TEST: NORMAL
ALP SERPL-CCNC: 147 U/L (ref 55–135)
ALT SERPL W/O P-5'-P-CCNC: 14 U/L (ref 10–44)
ANION GAP SERPL CALC-SCNC: 14 MMOL/L (ref 8–16)
ANION GAP SERPL CALC-SCNC: 20 MMOL/L (ref 8–16)
AST SERPL-CCNC: 19 U/L (ref 10–40)
B-OH-BUTYR BLD STRIP-SCNC: 2.1 MMOL/L (ref 0–0.5)
BASOPHILS # BLD AUTO: 0.04 K/UL (ref 0–0.2)
BASOPHILS NFR BLD: 0.6 % (ref 0–1.9)
BILIRUB SERPL-MCNC: 2.8 MG/DL (ref 0.1–1)
BUN SERPL-MCNC: 47 MG/DL (ref 6–20)
BUN SERPL-MCNC: 52 MG/DL (ref 6–20)
CALCIUM SERPL-MCNC: 7.6 MG/DL (ref 8.7–10.5)
CALCIUM SERPL-MCNC: 8 MG/DL (ref 8.7–10.5)
CHLORIDE SERPL-SCNC: 92 MMOL/L (ref 95–110)
CHLORIDE SERPL-SCNC: 97 MMOL/L (ref 95–110)
CO2 SERPL-SCNC: 21 MMOL/L (ref 23–29)
CO2 SERPL-SCNC: 27 MMOL/L (ref 23–29)
CREAT SERPL-MCNC: 6.7 MG/DL (ref 0.5–1.4)
CREAT SERPL-MCNC: 6.7 MG/DL (ref 0.5–1.4)
DELSYS: NORMAL
DIFFERENTIAL METHOD: ABNORMAL
EOSINOPHIL # BLD AUTO: 0 K/UL (ref 0–0.5)
EOSINOPHIL NFR BLD: 0.6 % (ref 0–8)
ERYTHROCYTE [DISTWIDTH] IN BLOOD BY AUTOMATED COUNT: 17.7 % (ref 11.5–14.5)
EST. GFR  (NO RACE VARIABLE): 8 ML/MIN/1.73 M^2
EST. GFR  (NO RACE VARIABLE): 8 ML/MIN/1.73 M^2
FIO2: 21
GLUCOSE SERPL-MCNC: 181 MG/DL (ref 70–110)
GLUCOSE SERPL-MCNC: 464 MG/DL (ref 70–110)
HCO3 UR-SCNC: 25.6 MMOL/L (ref 24–28)
HCT VFR BLD AUTO: 23.6 % (ref 37–48.5)
HGB BLD-MCNC: 8.2 G/DL (ref 12–16)
IMM GRANULOCYTES # BLD AUTO: 0.05 K/UL (ref 0–0.04)
IMM GRANULOCYTES NFR BLD AUTO: 0.8 % (ref 0–0.5)
LIPASE SERPL-CCNC: 20 U/L (ref 4–60)
LYMPHOCYTES # BLD AUTO: 0.9 K/UL (ref 1–4.8)
LYMPHOCYTES NFR BLD: 13.6 % (ref 18–48)
MCH RBC QN AUTO: 28.6 PG (ref 27–31)
MCHC RBC AUTO-ENTMCNC: 34.7 G/DL (ref 32–36)
MCV RBC AUTO: 82 FL (ref 82–98)
MODE: NORMAL
MONOCYTES # BLD AUTO: 0.6 K/UL (ref 0.3–1)
MONOCYTES NFR BLD: 9.7 % (ref 4–15)
NEUTROPHILS # BLD AUTO: 4.8 K/UL (ref 1.8–7.7)
NEUTROPHILS NFR BLD: 74.7 % (ref 38–73)
NRBC BLD-RTO: 0 /100 WBC
PCO2 BLDA: 41.6 MMHG (ref 35–45)
PH SMN: 7.4 [PH] (ref 7.35–7.45)
PLATELET # BLD AUTO: 71 K/UL (ref 150–450)
PMV BLD AUTO: 9.5 FL (ref 9.2–12.9)
PO2 BLDA: 89 MMHG (ref 80–100)
POC BE: 1 MMOL/L
POC SATURATED O2: 97 % (ref 95–100)
POC TCO2: 27 MMOL/L (ref 23–27)
POCT GLUCOSE: 142 MG/DL (ref 70–110)
POCT GLUCOSE: 187 MG/DL (ref 70–110)
POCT GLUCOSE: 463 MG/DL (ref 70–110)
POTASSIUM SERPL-SCNC: 3.2 MMOL/L (ref 3.5–5.1)
POTASSIUM SERPL-SCNC: 3.5 MMOL/L (ref 3.5–5.1)
PROT SERPL-MCNC: 7 G/DL (ref 6–8.4)
RBC # BLD AUTO: 2.87 M/UL (ref 4–5.4)
SAMPLE: NORMAL
SITE: NORMAL
SODIUM SERPL-SCNC: 133 MMOL/L (ref 136–145)
SODIUM SERPL-SCNC: 138 MMOL/L (ref 136–145)
SP02: 99
TROPONIN I SERPL DL<=0.01 NG/ML-MCNC: 0.05 NG/ML (ref 0–0.03)
TROPONIN I SERPL DL<=0.01 NG/ML-MCNC: 0.06 NG/ML (ref 0–0.03)
WBC # BLD AUTO: 6.39 K/UL (ref 3.9–12.7)

## 2023-02-07 PROCEDURE — G0257 UNSCHED DIALYSIS ESRD PT HOS: HCPCS

## 2023-02-07 PROCEDURE — 93010 EKG 12-LEAD: ICD-10-PCS | Mod: ,,, | Performed by: INTERNAL MEDICINE

## 2023-02-07 PROCEDURE — 36600 WITHDRAWAL OF ARTERIAL BLOOD: CPT

## 2023-02-07 PROCEDURE — 85025 COMPLETE CBC W/AUTO DIFF WBC: CPT | Performed by: EMERGENCY MEDICINE

## 2023-02-07 PROCEDURE — 25000003 PHARM REV CODE 250: Performed by: NURSE PRACTITIONER

## 2023-02-07 PROCEDURE — 96372 THER/PROPH/DIAG INJ SC/IM: CPT | Performed by: EMERGENCY MEDICINE

## 2023-02-07 PROCEDURE — 93010 ELECTROCARDIOGRAM REPORT: CPT | Mod: ,,, | Performed by: INTERNAL MEDICINE

## 2023-02-07 PROCEDURE — G0378 HOSPITAL OBSERVATION PER HR: HCPCS

## 2023-02-07 PROCEDURE — 36415 COLL VENOUS BLD VENIPUNCTURE: CPT | Performed by: NURSE PRACTITIONER

## 2023-02-07 PROCEDURE — 96375 TX/PRO/DX INJ NEW DRUG ADDON: CPT | Mod: 59

## 2023-02-07 PROCEDURE — 80048 BASIC METABOLIC PNL TOTAL CA: CPT | Mod: XB | Performed by: NURSE PRACTITIONER

## 2023-02-07 PROCEDURE — 36415 COLL VENOUS BLD VENIPUNCTURE: CPT | Performed by: INTERNAL MEDICINE

## 2023-02-07 PROCEDURE — 25000003 PHARM REV CODE 250: Performed by: STUDENT IN AN ORGANIZED HEALTH CARE EDUCATION/TRAINING PROGRAM

## 2023-02-07 PROCEDURE — 99900035 HC TECH TIME PER 15 MIN (STAT)

## 2023-02-07 PROCEDURE — 63600175 PHARM REV CODE 636 W HCPCS: Performed by: STUDENT IN AN ORGANIZED HEALTH CARE EDUCATION/TRAINING PROGRAM

## 2023-02-07 PROCEDURE — 63600175 PHARM REV CODE 636 W HCPCS: Performed by: NURSE PRACTITIONER

## 2023-02-07 PROCEDURE — 82803 BLOOD GASES ANY COMBINATION: CPT

## 2023-02-07 PROCEDURE — 96375 TX/PRO/DX INJ NEW DRUG ADDON: CPT

## 2023-02-07 PROCEDURE — 83690 ASSAY OF LIPASE: CPT | Performed by: EMERGENCY MEDICINE

## 2023-02-07 PROCEDURE — 96372 THER/PROPH/DIAG INJ SC/IM: CPT | Performed by: STUDENT IN AN ORGANIZED HEALTH CARE EDUCATION/TRAINING PROGRAM

## 2023-02-07 PROCEDURE — 96374 THER/PROPH/DIAG INJ IV PUSH: CPT

## 2023-02-07 PROCEDURE — 84484 ASSAY OF TROPONIN QUANT: CPT | Performed by: STUDENT IN AN ORGANIZED HEALTH CARE EDUCATION/TRAINING PROGRAM

## 2023-02-07 PROCEDURE — 82010 KETONE BODYS QUAN: CPT | Performed by: EMERGENCY MEDICINE

## 2023-02-07 PROCEDURE — 96372 THER/PROPH/DIAG INJ SC/IM: CPT | Mod: 59 | Performed by: NURSE PRACTITIONER

## 2023-02-07 PROCEDURE — 86706 HEP B SURFACE ANTIBODY: CPT | Mod: 91 | Performed by: INTERNAL MEDICINE

## 2023-02-07 PROCEDURE — 82962 GLUCOSE BLOOD TEST: CPT

## 2023-02-07 PROCEDURE — 25000003 PHARM REV CODE 250: Performed by: EMERGENCY MEDICINE

## 2023-02-07 PROCEDURE — 25000003 PHARM REV CODE 250: Performed by: INTERNAL MEDICINE

## 2023-02-07 PROCEDURE — 99285 EMERGENCY DEPT VISIT HI MDM: CPT | Mod: 25

## 2023-02-07 PROCEDURE — 87340 HEPATITIS B SURFACE AG IA: CPT | Performed by: INTERNAL MEDICINE

## 2023-02-07 PROCEDURE — 80053 COMPREHEN METABOLIC PANEL: CPT | Performed by: EMERGENCY MEDICINE

## 2023-02-07 PROCEDURE — 36415 COLL VENOUS BLD VENIPUNCTURE: CPT | Performed by: EMERGENCY MEDICINE

## 2023-02-07 PROCEDURE — 86704 HEP B CORE ANTIBODY TOTAL: CPT | Performed by: INTERNAL MEDICINE

## 2023-02-07 PROCEDURE — 94761 N-INVAS EAR/PLS OXIMETRY MLT: CPT

## 2023-02-07 PROCEDURE — 93005 ELECTROCARDIOGRAM TRACING: CPT

## 2023-02-07 PROCEDURE — 63600175 PHARM REV CODE 636 W HCPCS: Performed by: EMERGENCY MEDICINE

## 2023-02-07 PROCEDURE — 80100014 HC HEMODIALYSIS 1:1

## 2023-02-07 PROCEDURE — 84484 ASSAY OF TROPONIN QUANT: CPT | Mod: 91 | Performed by: NURSE PRACTITIONER

## 2023-02-07 PROCEDURE — 36415 COLL VENOUS BLD VENIPUNCTURE: CPT | Performed by: STUDENT IN AN ORGANIZED HEALTH CARE EDUCATION/TRAINING PROGRAM

## 2023-02-07 RX ORDER — HYDRALAZINE HYDROCHLORIDE 25 MG/1
100 TABLET, FILM COATED ORAL EVERY 8 HOURS
Status: DISCONTINUED | OUTPATIENT
Start: 2023-02-07 | End: 2023-02-08 | Stop reason: HOSPADM

## 2023-02-07 RX ORDER — GLUCAGON 1 MG
1 KIT INJECTION
Status: DISCONTINUED | OUTPATIENT
Start: 2023-02-07 | End: 2023-02-08 | Stop reason: HOSPADM

## 2023-02-07 RX ORDER — AMOXICILLIN 250 MG
1 CAPSULE ORAL 2 TIMES DAILY
Status: DISCONTINUED | OUTPATIENT
Start: 2023-02-07 | End: 2023-02-08 | Stop reason: HOSPADM

## 2023-02-07 RX ORDER — SODIUM CHLORIDE 9 MG/ML
INJECTION, SOLUTION INTRAVENOUS ONCE
Status: CANCELLED | OUTPATIENT
Start: 2023-02-07 | End: 2023-02-07

## 2023-02-07 RX ORDER — SIMETHICONE 80 MG
2 TABLET,CHEWABLE ORAL 4 TIMES DAILY PRN
Status: DISCONTINUED | OUTPATIENT
Start: 2023-02-07 | End: 2023-02-08 | Stop reason: HOSPADM

## 2023-02-07 RX ORDER — LANOLIN ALCOHOL/MO/W.PET/CERES
1 CREAM (GRAM) TOPICAL DAILY
Status: DISCONTINUED | OUTPATIENT
Start: 2023-02-08 | End: 2023-02-08 | Stop reason: HOSPADM

## 2023-02-07 RX ORDER — SODIUM CHLORIDE 0.9 % (FLUSH) 0.9 %
10 SYRINGE (ML) INJECTION EVERY 12 HOURS PRN
Status: DISCONTINUED | OUTPATIENT
Start: 2023-02-07 | End: 2023-02-08 | Stop reason: HOSPADM

## 2023-02-07 RX ORDER — PANTOPRAZOLE SODIUM 40 MG/1
40 TABLET, DELAYED RELEASE ORAL
Status: COMPLETED | OUTPATIENT
Start: 2023-02-07 | End: 2023-02-07

## 2023-02-07 RX ORDER — ONDANSETRON 2 MG/ML
4 INJECTION INTRAMUSCULAR; INTRAVENOUS EVERY 6 HOURS PRN
Status: DISCONTINUED | OUTPATIENT
Start: 2023-02-07 | End: 2023-02-07

## 2023-02-07 RX ORDER — ACETAMINOPHEN 325 MG/1
650 TABLET ORAL EVERY 8 HOURS PRN
Status: DISCONTINUED | OUTPATIENT
Start: 2023-02-07 | End: 2023-02-08 | Stop reason: HOSPADM

## 2023-02-07 RX ORDER — METOCLOPRAMIDE HYDROCHLORIDE 5 MG/ML
10 INJECTION INTRAMUSCULAR; INTRAVENOUS EVERY 6 HOURS PRN
Status: DISCONTINUED | OUTPATIENT
Start: 2023-02-07 | End: 2023-02-08 | Stop reason: HOSPADM

## 2023-02-07 RX ORDER — TALC
6 POWDER (GRAM) TOPICAL NIGHTLY PRN
Status: DISCONTINUED | OUTPATIENT
Start: 2023-02-07 | End: 2023-02-08 | Stop reason: HOSPADM

## 2023-02-07 RX ORDER — ONDANSETRON 2 MG/ML
4 INJECTION INTRAMUSCULAR; INTRAVENOUS EVERY 8 HOURS PRN
Status: DISCONTINUED | OUTPATIENT
Start: 2023-02-07 | End: 2023-02-07

## 2023-02-07 RX ORDER — CARVEDILOL 25 MG/1
25 TABLET ORAL
Status: COMPLETED | OUTPATIENT
Start: 2023-02-07 | End: 2023-02-07

## 2023-02-07 RX ORDER — HYDRALAZINE HYDROCHLORIDE 25 MG/1
100 TABLET, FILM COATED ORAL
Status: COMPLETED | OUTPATIENT
Start: 2023-02-07 | End: 2023-02-07

## 2023-02-07 RX ORDER — ASPIRIN 325 MG
325 TABLET, DELAYED RELEASE (ENTERIC COATED) ORAL
Status: COMPLETED | OUTPATIENT
Start: 2023-02-07 | End: 2023-02-07

## 2023-02-07 RX ORDER — MUPIROCIN 20 MG/G
OINTMENT TOPICAL 2 TIMES DAILY
Status: DISCONTINUED | OUTPATIENT
Start: 2023-02-07 | End: 2023-02-08 | Stop reason: HOSPADM

## 2023-02-07 RX ORDER — IPRATROPIUM BROMIDE AND ALBUTEROL SULFATE 2.5; .5 MG/3ML; MG/3ML
3 SOLUTION RESPIRATORY (INHALATION) EVERY 4 HOURS PRN
Status: DISCONTINUED | OUTPATIENT
Start: 2023-02-07 | End: 2023-02-08 | Stop reason: HOSPADM

## 2023-02-07 RX ORDER — DROPERIDOL 2.5 MG/ML
1.25 INJECTION, SOLUTION INTRAMUSCULAR; INTRAVENOUS ONCE
Status: COMPLETED | OUTPATIENT
Start: 2023-02-07 | End: 2023-02-07

## 2023-02-07 RX ORDER — GABAPENTIN 100 MG/1
100 CAPSULE ORAL
Status: COMPLETED | OUTPATIENT
Start: 2023-02-07 | End: 2023-02-07

## 2023-02-07 RX ORDER — CLONIDINE 0.2 MG/24H
1 PATCH, EXTENDED RELEASE TRANSDERMAL
Status: DISCONTINUED | OUTPATIENT
Start: 2023-02-08 | End: 2023-02-08 | Stop reason: HOSPADM

## 2023-02-07 RX ORDER — IBUPROFEN 200 MG
16 TABLET ORAL
Status: DISCONTINUED | OUTPATIENT
Start: 2023-02-07 | End: 2023-02-08 | Stop reason: HOSPADM

## 2023-02-07 RX ORDER — LOSARTAN POTASSIUM AND HYDROCHLOROTHIAZIDE 12.5; 5 MG/1; MG/1
1 TABLET ORAL
Status: COMPLETED | OUTPATIENT
Start: 2023-02-07 | End: 2023-02-07

## 2023-02-07 RX ORDER — PROCHLORPERAZINE EDISYLATE 5 MG/ML
10 INJECTION INTRAMUSCULAR; INTRAVENOUS EVERY 6 HOURS PRN
Status: DISCONTINUED | OUTPATIENT
Start: 2023-02-07 | End: 2023-02-08 | Stop reason: HOSPADM

## 2023-02-07 RX ORDER — IBUPROFEN 200 MG
24 TABLET ORAL
Status: DISCONTINUED | OUTPATIENT
Start: 2023-02-07 | End: 2023-02-08 | Stop reason: HOSPADM

## 2023-02-07 RX ORDER — AMLODIPINE BESYLATE 5 MG/1
10 TABLET ORAL DAILY
Status: DISCONTINUED | OUTPATIENT
Start: 2023-02-08 | End: 2023-02-08 | Stop reason: HOSPADM

## 2023-02-07 RX ORDER — ISOSORBIDE MONONITRATE 60 MG/1
120 TABLET, EXTENDED RELEASE ORAL DAILY
Status: DISCONTINUED | OUTPATIENT
Start: 2023-02-08 | End: 2023-02-08 | Stop reason: HOSPADM

## 2023-02-07 RX ORDER — LEVETIRACETAM 500 MG/1
500 TABLET ORAL
Status: COMPLETED | OUTPATIENT
Start: 2023-02-07 | End: 2023-02-07

## 2023-02-07 RX ORDER — ONDANSETRON 2 MG/ML
4 INJECTION INTRAMUSCULAR; INTRAVENOUS
Status: COMPLETED | OUTPATIENT
Start: 2023-02-07 | End: 2023-02-07

## 2023-02-07 RX ORDER — CLONIDINE HYDROCHLORIDE 0.1 MG/1
0.1 TABLET ORAL EVERY 6 HOURS PRN
Status: DISCONTINUED | OUTPATIENT
Start: 2023-02-07 | End: 2023-02-08 | Stop reason: HOSPADM

## 2023-02-07 RX ORDER — SODIUM CHLORIDE 9 MG/ML
INJECTION, SOLUTION INTRAVENOUS
Status: CANCELLED | OUTPATIENT
Start: 2023-02-07

## 2023-02-07 RX ORDER — FUROSEMIDE 40 MG/1
40 TABLET ORAL 2 TIMES DAILY
Status: DISCONTINUED | OUTPATIENT
Start: 2023-02-08 | End: 2023-02-08 | Stop reason: HOSPADM

## 2023-02-07 RX ORDER — GABAPENTIN 100 MG/1
100 CAPSULE ORAL 2 TIMES DAILY
Status: DISCONTINUED | OUTPATIENT
Start: 2023-02-07 | End: 2023-02-08 | Stop reason: HOSPADM

## 2023-02-07 RX ORDER — DICYCLOMINE HYDROCHLORIDE 10 MG/1
10 CAPSULE ORAL 3 TIMES DAILY
Status: DISCONTINUED | OUTPATIENT
Start: 2023-02-07 | End: 2023-02-08 | Stop reason: HOSPADM

## 2023-02-07 RX ORDER — HEPARIN SODIUM 5000 [USP'U]/ML
5000 INJECTION, SOLUTION INTRAVENOUS; SUBCUTANEOUS
Status: CANCELLED | OUTPATIENT
Start: 2023-02-07

## 2023-02-07 RX ORDER — AMLODIPINE BESYLATE 5 MG/1
10 TABLET ORAL
Status: COMPLETED | OUTPATIENT
Start: 2023-02-07 | End: 2023-02-07

## 2023-02-07 RX ORDER — FUROSEMIDE 40 MG/1
40 TABLET ORAL
Status: COMPLETED | OUTPATIENT
Start: 2023-02-07 | End: 2023-02-07

## 2023-02-07 RX ORDER — MORPHINE SULFATE 2 MG/ML
6 INJECTION, SOLUTION INTRAMUSCULAR; INTRAVENOUS
Status: COMPLETED | OUTPATIENT
Start: 2023-02-07 | End: 2023-02-07

## 2023-02-07 RX ORDER — MIRTAZAPINE 15 MG/1
15 TABLET, FILM COATED ORAL NIGHTLY
Status: DISCONTINUED | OUTPATIENT
Start: 2023-02-07 | End: 2023-02-08 | Stop reason: HOSPADM

## 2023-02-07 RX ORDER — ISOSORBIDE MONONITRATE 60 MG/1
120 TABLET, EXTENDED RELEASE ORAL
Status: COMPLETED | OUTPATIENT
Start: 2023-02-07 | End: 2023-02-07

## 2023-02-07 RX ORDER — INSULIN ASPART 100 [IU]/ML
1-10 INJECTION, SOLUTION INTRAVENOUS; SUBCUTANEOUS
Status: DISCONTINUED | OUTPATIENT
Start: 2023-02-07 | End: 2023-02-08 | Stop reason: HOSPADM

## 2023-02-07 RX ORDER — PANTOPRAZOLE SODIUM 40 MG/1
40 TABLET, DELAYED RELEASE ORAL DAILY
Status: DISCONTINUED | OUTPATIENT
Start: 2023-02-08 | End: 2023-02-08 | Stop reason: HOSPADM

## 2023-02-07 RX ORDER — LOSARTAN POTASSIUM AND HYDROCHLOROTHIAZIDE 12.5; 5 MG/1; MG/1
1 TABLET ORAL DAILY
Status: DISCONTINUED | OUTPATIENT
Start: 2023-02-08 | End: 2023-02-08 | Stop reason: HOSPADM

## 2023-02-07 RX ORDER — CARVEDILOL 25 MG/1
25 TABLET ORAL 2 TIMES DAILY
Status: DISCONTINUED | OUTPATIENT
Start: 2023-02-07 | End: 2023-02-08 | Stop reason: HOSPADM

## 2023-02-07 RX ORDER — MAG HYDROX/ALUMINUM HYD/SIMETH 200-200-20
30 SUSPENSION, ORAL (FINAL DOSE FORM) ORAL 4 TIMES DAILY PRN
Status: DISCONTINUED | OUTPATIENT
Start: 2023-02-07 | End: 2023-02-08 | Stop reason: HOSPADM

## 2023-02-07 RX ORDER — LIDOCAINE 50 MG/G
1 PATCH TOPICAL
Status: COMPLETED | OUTPATIENT
Start: 2023-02-07 | End: 2023-02-08

## 2023-02-07 RX ORDER — DIPHENHYDRAMINE HYDROCHLORIDE 50 MG/ML
25 INJECTION INTRAMUSCULAR; INTRAVENOUS
Status: COMPLETED | OUTPATIENT
Start: 2023-02-07 | End: 2023-02-07

## 2023-02-07 RX ORDER — FLUOXETINE HYDROCHLORIDE 20 MG/1
20 CAPSULE ORAL DAILY
Status: DISCONTINUED | OUTPATIENT
Start: 2023-02-08 | End: 2023-02-08 | Stop reason: HOSPADM

## 2023-02-07 RX ORDER — NALOXONE HCL 0.4 MG/ML
0.02 VIAL (ML) INJECTION
Status: DISCONTINUED | OUTPATIENT
Start: 2023-02-07 | End: 2023-02-08 | Stop reason: HOSPADM

## 2023-02-07 RX ORDER — LEVETIRACETAM 500 MG/1
500 TABLET ORAL 2 TIMES DAILY
Status: DISCONTINUED | OUTPATIENT
Start: 2023-02-07 | End: 2023-02-08 | Stop reason: HOSPADM

## 2023-02-07 RX ORDER — INSULIN ASPART 100 [IU]/ML
8 INJECTION, SOLUTION INTRAVENOUS; SUBCUTANEOUS
Status: COMPLETED | OUTPATIENT
Start: 2023-02-07 | End: 2023-02-07

## 2023-02-07 RX ORDER — POTASSIUM CHLORIDE 20 MEQ/1
40 TABLET, EXTENDED RELEASE ORAL ONCE
Status: COMPLETED | OUTPATIENT
Start: 2023-02-07 | End: 2023-02-07

## 2023-02-07 RX ADMIN — ASPIRIN 325 MG: 325 TABLET, COATED ORAL at 07:02

## 2023-02-07 RX ADMIN — CARVEDILOL 25 MG: 25 TABLET, FILM COATED ORAL at 08:02

## 2023-02-07 RX ADMIN — INSULIN ASPART 1 UNITS: 100 INJECTION, SOLUTION INTRAVENOUS; SUBCUTANEOUS at 08:02

## 2023-02-07 RX ADMIN — ISOSORBIDE MONONITRATE 120 MG: 60 TABLET, EXTENDED RELEASE ORAL at 08:02

## 2023-02-07 RX ADMIN — PROCHLORPERAZINE EDISYLATE 10 MG: 5 INJECTION INTRAMUSCULAR; INTRAVENOUS at 07:02

## 2023-02-07 RX ADMIN — ACETAMINOPHEN 650 MG: 325 TABLET ORAL at 06:02

## 2023-02-07 RX ADMIN — MUPIROCIN: 20 OINTMENT TOPICAL at 09:02

## 2023-02-07 RX ADMIN — AMLODIPINE BESYLATE 10 MG: 5 TABLET ORAL at 08:02

## 2023-02-07 RX ADMIN — LEVETIRACETAM 500 MG: 500 TABLET, FILM COATED ORAL at 08:02

## 2023-02-07 RX ADMIN — MORPHINE SULFATE 6 MG: 2 INJECTION, SOLUTION INTRAMUSCULAR; INTRAVENOUS at 07:02

## 2023-02-07 RX ADMIN — DICYCLOMINE HYDROCHLORIDE 10 MG: 10 CAPSULE ORAL at 08:02

## 2023-02-07 RX ADMIN — PANTOPRAZOLE SODIUM 40 MG: 40 TABLET, DELAYED RELEASE ORAL at 08:02

## 2023-02-07 RX ADMIN — DROPERIDOL 1.25 MG: 2.5 INJECTION, SOLUTION INTRAMUSCULAR; INTRAVENOUS at 03:02

## 2023-02-07 RX ADMIN — HYDRALAZINE HYDROCHLORIDE 100 MG: 25 TABLET, FILM COATED ORAL at 08:02

## 2023-02-07 RX ADMIN — MELATONIN TAB 3 MG 6 MG: 3 TAB at 08:02

## 2023-02-07 RX ADMIN — GABAPENTIN 100 MG: 100 CAPSULE ORAL at 08:02

## 2023-02-07 RX ADMIN — LIDOCAINE 1 PATCH: 50 PATCH CUTANEOUS at 10:02

## 2023-02-07 RX ADMIN — APIXABAN 5 MG: 2.5 TABLET, FILM COATED ORAL at 08:02

## 2023-02-07 RX ADMIN — INSULIN DETEMIR 16 UNITS: 100 INJECTION, SOLUTION SUBCUTANEOUS at 08:02

## 2023-02-07 RX ADMIN — MUPIROCIN: 20 OINTMENT TOPICAL at 08:02

## 2023-02-07 RX ADMIN — LOSARTAN POTASSIUM AND HYDROCHLOROTHIAZIDE 1 TABLET: 12.5; 5 TABLET ORAL at 09:02

## 2023-02-07 RX ADMIN — FUROSEMIDE 40 MG: 40 TABLET ORAL at 08:02

## 2023-02-07 RX ADMIN — CLONIDINE HYDROCHLORIDE 0.1 MG: 0.1 TABLET ORAL at 07:02

## 2023-02-07 RX ADMIN — ONDANSETRON 4 MG: 2 INJECTION INTRAMUSCULAR; INTRAVENOUS at 07:02

## 2023-02-07 RX ADMIN — DIPHENHYDRAMINE HYDROCHLORIDE 25 MG: 50 INJECTION, SOLUTION INTRAMUSCULAR; INTRAVENOUS at 02:02

## 2023-02-07 RX ADMIN — INSULIN ASPART 8 UNITS: 100 INJECTION, SOLUTION INTRAVENOUS; SUBCUTANEOUS at 08:02

## 2023-02-07 RX ADMIN — MIRTAZAPINE 15 MG: 15 TABLET, FILM COATED ORAL at 08:02

## 2023-02-07 RX ADMIN — POTASSIUM CHLORIDE 40 MEQ: 1500 TABLET, EXTENDED RELEASE ORAL at 09:02

## 2023-02-07 NOTE — ED PROVIDER NOTES
"Encounter Date: 2/6/2023    SCRIBE #1 NOTE: I, Winifred uYsuf, am scribing for, and in the presence of,  Bryan Brooke MD.     History     Chief Complaint   Patient presents with    Back Pain    Chest Pain     Started this am     Abdominal Pain     Time seen by provider: 9:23 PM on 02/06/2023    Tabby Howard is a 33 y.o. female with a PMHx of DM type 2, supraventricular tachycardia, GERD,  Heart failure with preserved ejection fraction (EF 55% on 3/22), sickle cell trait, gastroparesis, CKD, hyperkalemia and HTN who presents to the ED with an onset of nausea, headache, abdominal elen, back pain, and flank pain that started this morning. The patient does dialysis on T, TH, F. She does not produce urine. She was seen at a hospital yesterday for similar symptoms. Review of external records performed: Per the notes from this visit "The current episode started yesterday. The onset of the illness was abrupt. The problem has been rapidly worsening. The abdominal pain is located in the RLQ, LLQ and suprapubic region. The abdominal pain radiates to the back. The severity of the abdominal pain is 10/10." The patient denies vomiting, fever or any other symptoms at this time. PSHx of esophagogastroduodenoscopy, placement of dual-lumen vascular catheter, cholecystectomy, and colonoscopy.      The history is provided by the patient.   Review of patient's allergies indicates:   Allergen Reactions    Penicillins Hives     Past Medical History:   Diagnosis Date    CKD (chronic kidney disease), stage IV 4/12/2022    Diabetes mellitus due to underlying condition with unspecified complications 4/12/2022    Gastroparesis 4/12/2022    Heart failure with preserved ejection fraction 4/12/2022    EF 55% on 3/22    History of gastroesophageal reflux (GERD)     History of supraventricular tachycardia     Hyperkalemia 7/7/2022    Hypertensive emergency 7/8/2022    Sickle cell trait 4/12/2022    Type 2 diabetes mellitus      Past Surgical " History:   Procedure Laterality Date     SECTION      x 3    COLONOSCOPY      COLONOSCOPY N/A 2022    Procedure: COLONOSCOPY;  Surgeon: Jagdeep Cedeno MD;  Location: [facility];  Service: Endoscopy;  Laterality: N/A;    ESOPHAGOGASTRODUODENOSCOPY N/A 10/18/2019    Procedure: ESOPHAGOGASTRODUODENOSCOPY (EGD);  Surgeon: Gianluca Mendez MD;  Location: [facility];  Service: Endoscopy;  Laterality: N/A;    ESOPHAGOGASTRODUODENOSCOPY N/A 2022    Procedure: EGD (ESOPHAGOGASTRODUODENOSCOPY);  Surgeon: Micky Paredes III, MD;  Location: [facility];  Service: Endoscopy;  Laterality: N/A;    ESOPHAGOGASTRODUODENOSCOPY N/A 2022    Procedure: EGD (ESOPHAGOGASTRODUODENOSCOPY);  Surgeon: Marcelo Zhong MD;  Location: [facility];  Service: Endoscopy;  Laterality: N/A;    LAPAROSCOPIC CHOLECYSTECTOMY N/A 2022    Procedure: CHOLECYSTECTOMY, LAPAROSCOPIC;  Surgeon: Grey Perez MD;  Location: [facility];  Service: General;  Laterality: N/A;    PLACEMENT OF DUAL-LUMEN VASCULAR CATHETER Left 2022    Procedure: INSERTION-CATHETER-JOSEPH;  Surgeon: Dionte Gan MD;  Location: [facility] OR;  Service: General;  Laterality: Left;    PLACEMENT OF DUAL-LUMEN VASCULAR CATHETER Right 2022    Procedure: INSERTION-CATHETER-Hemosplit;  Surgeon: Dionte Gan MD;  Location: [facility];  Service: General;  Laterality: Right;     Family History   Problem Relation Age of Onset    Diabetes Mother     Diabetes Father      Social History     Tobacco Use    Smoking status: Never    Smokeless tobacco: Never   Substance Use Topics    Alcohol use: Not Currently    Drug use: No     Review of Systems   Constitutional:  Negative for fever.   HENT:  Negative for sore throat.    Respiratory:  Negative for shortness of breath.    Cardiovascular:  Negative for chest pain.   Gastrointestinal:  Positive for abdominal pain and nausea.   Genitourinary:  Positive for flank pain. Negative for dysuria.   Musculoskeletal:   Positive for back pain.   Skin:  Negative for rash.   Neurological:  Positive for headaches. Negative for weakness.   Hematological:  Does not bruise/bleed easily.     Physical Exam     Initial Vitals [02/06/23 1817]   BP Pulse Resp Temp SpO2   (!) 187/110 93 20 97.9 °F (36.6 °C) 100 %      MAP       --         Physical Exam    Nursing note and vitals reviewed.  Constitutional: She appears well-developed and well-nourished. She is not diaphoretic. No distress.   HENT:   Head: Normocephalic and atraumatic.   Eyes: EOM are normal. Pupils are equal, round, and reactive to light.   Neck: Neck supple.   Normal range of motion.  Cardiovascular:  Normal rate, regular rhythm, normal heart sounds and intact distal pulses.     Exam reveals no gallop and no friction rub.       No murmur heard.  Pulmonary/Chest: Breath sounds normal. No respiratory distress. She has no wheezes. She has no rhonchi. She has no rales.   Abdominal: Abdomen is soft. Bowel sounds are normal. There is no abdominal tenderness. There is no rebound and no guarding.   Musculoskeletal:         General: Normal range of motion.      Cervical back: Normal range of motion and neck supple.     Neurological: She is alert and oriented to person, place, and time.   Skin: Skin is warm and dry.   Psychiatric: She has a normal mood and affect. Her behavior is normal. Judgment and thought content normal.       ED Course   Procedures  Labs Reviewed   CBC W/ AUTO DIFFERENTIAL - Abnormal; Notable for the following components:       Result Value    RBC 2.60 (*)     Hemoglobin 7.4 (*)     Hematocrit 21.2 (*)     RDW 17.9 (*)     Platelets 78 (*)     Immature Granulocytes 0.7 (*)     Immature Grans (Abs) 0.05 (*)     Gran % 73.9 (*)     Lymph % 14.9 (*)     All other components within normal limits   COMPREHENSIVE METABOLIC PANEL - Abnormal; Notable for the following components:    Sodium 135 (*)     Chloride 94 (*)     Glucose 476 (*)     BUN 43 (*)     Creatinine 5.8 (*)      Calcium 8.1 (*)     Total Bilirubin 2.4 (*)     Alkaline Phosphatase 136 (*)     Anion Gap 18 (*)     eGFR 9 (*)     All other components within normal limits    Narrative:     Glucose  critical result(s) called and verbal readback obtained from   Florentino Kenny RN. by LEATHA 02/06/2023 20:37   TROPONIN I - Abnormal; Notable for the following components:    Troponin I 0.053 (*)     All other components within normal limits   B-TYPE NATRIURETIC PEPTIDE - Abnormal; Notable for the following components:    BNP 2,392 (*)     All other components within normal limits     EKG Readings: (Independently Interpreted)   Normal sinus rhythm at rate of 96 bpm with normal axis and normal intervals. Prolonged QT interval. No acute ST segment changes.          Imaging Results    None          Medications   metoclopramide HCl injection 10 mg (10 mg Intramuscular Given 2/6/23 2145)   diphenhydrAMINE injection 25 mg (25 mg Intramuscular Given 2/6/23 2143)     Medical Decision Making:   History:   Old Medical Records: I decided to obtain old medical records.  Initial Assessment:   33-year-old female presented with multiple complaints.  Differential Diagnosis:   Initial differential diagnosis included but not limited to chronic pain, gastroparesis, and drug-seeking behavior.  Independently Interpreted Test(s):   I have ordered and independently interpreted EKG Reading(s) - see prior notes  Clinical Tests:   Lab Tests: Ordered and Reviewed  Medical Tests: Ordered and Reviewed  ED Management:  The patient was emergently evaluated in the emergency department, her evaluation was significant for a younger female with a benign abdominal exam.  The patient's EKG showed no acute abnormalities per my independent interpretation.  The patient's labs were significant for her chronic end-stage renal disease, chronically elevated glucose, chronically elevated troponin, and chronically elevated BNP.  The patient does however have worsening of her  chronic anemia but she is asymptomatic from this and does not any further workup or treatment at this time for this.  The patient had a recent CT scan at another facility yesterday for the same complaints that showed no acute intra-abdominal abnormalities.  I do not believe the patient needs further radiologic imaging tonight.  The patient was treated with a dose of parental Reglan and parental Benadryl.  The etiology the patient's symptoms is likely her chronic gastroparesis versus drug-seeking behavior.  The patient is stable for discharge to home.  She is to continue her home medications as previously prescribed.  She is encouraged to follow-up with her GI physician for further care.        Scribe Attestation:   Scribe #1: I performed the above scribed service and the documentation accurately describes the services I performed. I attest to the accuracy of the note.               I, Dr. Bryan Brooke, personally performed the services described in this documentation. All medical record entries made by the scribe were at my direction and in my presence.  I have reviewed the chart and agree that the record reflects my personal performance and is accurate and complete. Bryan Brooke MD.  11:51 PM 02/06/2023      Clinical Impression:   Final diagnoses:  [R07.9] Chest pain  [K31.84] Gastroparesis (Primary)  [N18.6, Z99.2] ESRD (end stage renal disease) on dialysis  [D64.9] Anemia, unspecified type        ED Disposition Condition    Discharge Stable          ED Prescriptions    None       Follow-up Information       Follow up With Specialties Details Why Contact [facility]  Schedule an appointment as soon as possible for a visit   [address] [facility]  [place] [address]  [phone]      Marcelo Zhong MD Gastroenterology Schedule an appointment as soon as possible for a visit   [address] [facility]  [address]  [place] [address]  [phone]               Bryan Brooke,  MD  02/06/23 3143

## 2023-02-07 NOTE — SUBJECTIVE & OBJECTIVE
Past Medical History:   Diagnosis Date    CKD (chronic kidney disease), stage IV 2022    Diabetes mellitus due to underlying condition with unspecified complications 2022    Gastroparesis 2022    Heart failure with preserved ejection fraction 2022    EF 55% on 3/22    History of gastroesophageal reflux (GERD)     History of supraventricular tachycardia     Hyperkalemia 2022    Hypertensive emergency 2022    Sickle cell trait 2022    Type 2 diabetes mellitus        Past Surgical History:   Procedure Laterality Date     SECTION      x 3    COLONOSCOPY      COLONOSCOPY N/A 2022    Procedure: COLONOSCOPY;  Surgeon: Jagdeep Cedeno MD;  Location: [facility];  Service: Endoscopy;  Laterality: N/A;    ESOPHAGOGASTRODUODENOSCOPY N/A 10/18/2019    Procedure: ESOPHAGOGASTRODUODENOSCOPY (EGD);  Surgeon: Gianluca Mendez MD;  Location: [facility];  Service: Endoscopy;  Laterality: N/A;    ESOPHAGOGASTRODUODENOSCOPY N/A 2022    Procedure: EGD (ESOPHAGOGASTRODUODENOSCOPY);  Surgeon: Micky Paredes III, MD;  Location: [facility];  Service: Endoscopy;  Laterality: N/A;    ESOPHAGOGASTRODUODENOSCOPY N/A 2022    Procedure: EGD (ESOPHAGOGASTRODUODENOSCOPY);  Surgeon: Marcelo Zhong MD;  Location: [facility];  Service: Endoscopy;  Laterality: N/A;    LAPAROSCOPIC CHOLECYSTECTOMY N/A 2022    Procedure: CHOLECYSTECTOMY, LAPAROSCOPIC;  Surgeon: Grey Perez MD;  Location: [facility];  Service: General;  Laterality: N/A;    PLACEMENT OF DUAL-LUMEN VASCULAR CATHETER Left 2022    Procedure: INSERTION-CATHETER-JOSEPH;  Surgeon: Dionte Gan MD;  Location: [facility] OR;  Service: General;  Laterality: Left;    PLACEMENT OF DUAL-LUMEN VASCULAR CATHETER Right 2022    Procedure: INSERTION-CATHETER-Hemosplit;  Surgeon: Dionte Gan MD;  Location: [facility] OR;  Service: General;  Laterality: Right;       Review of patient's allergies indicates:   Allergen Reactions     Penicillins Hives       Current Facility-Administered Medications on File Prior to Encounter   Medication    [COMPLETED] diphenhydrAMINE injection 25 mg    [COMPLETED] metoclopramide HCl injection 10 mg     Current Outpatient Medications on File Prior to Encounter   Medication Sig    albuterol (PROVENTIL/VENTOLIN HFA) 90 mcg/actuation inhaler Inhale 2 puffs into the lungs every 6 (six) hours as needed for Wheezing. Rescue    amLODIPine (NORVASC) 10 MG tablet Take 1 tablet (10 mg total) by mouth once daily.    apixaban (ELIQUIS) 5 mg Tab Take 5 mg by mouth 2 (two) times daily.    carvediloL (COREG) 25 MG tablet Take 1 tablet (25 mg total) by mouth 2 (two) times daily.    cloNIDine 0.2 mg/24 hr td ptwk (CATAPRES) 0.2 mg/24 hr Place 1 patch onto the skin every 7 days.    [] dicyclomine (BENTYL) 10 MG capsule Take 1 capsule (10 mg total) by mouth 3 (three) times daily.    ferrous sulfate 325 (65 FE) MG EC tablet Take 325 mg by mouth once daily.    FLUoxetine 20 MG capsule Take 1 capsule (20 mg total) by mouth once daily.    furosemide (LASIX) 40 MG tablet Take 40 mg by mouth 2 (two) times a day.    gabapentin (NEURONTIN) 100 MG capsule Take 1 capsule (100 mg total) by mouth 2 (two) times daily.    hydrALAZINE (APRESOLINE) 100 MG tablet Take 1 tablet (100 mg total) by mouth every 8 (eight) hours.    HYDROcodone-acetaminophen (NORCO)  mg per tablet Take 1 tablet by mouth every 4 (four) hours as needed for Pain.    HYDROmorphone (DILAUDID) 4 MG tablet Take 4 mg by mouth every 12 (twelve) hours as needed for Pain.    insulin (LANTUS SOLOSTAR U-100 INSULIN) glargine 100 units/mL SubQ pen Inject 16 Units into the skin every evening.    insulin aspart U-100 (NOVOLOG) 100 unit/mL (3 mL) InPn pen Inject 1-10 Units into the skin before meals and at bedtime as needed (Hyperglycemia). Max daily dose 40 units.    isosorbide mononitrate (IMDUR) 60 MG 24 hr tablet Take 2 tablets (120 mg total) by mouth once daily.     "levETIRAcetam (KEPPRA) 500 MG Tab Take 1 tablet (500 mg total) by mouth 2 (two) times daily.    losartan-hydrochlorothiazide 100-12.5 mg (HYZAAR) 100-12.5 mg Tab Take 1 tablet by mouth once daily.    mirtazapine (REMERON SOL-TAB) 15 MG disintegrating tablet Take 1 tablet (15 mg total) by mouth nightly.    ondansetron (ZOFRAN) 4 MG tablet Take 1 tablet (4 mg total) by mouth every 8 (eight) hours as needed for Nausea.    pantoprazole (PROTONIX) 40 MG tablet Take 40 mg by mouth once daily.    pen needle, diabetic (BD ULTRA-FINE MAGALIE PEN NEEDLE) 32 gauge x 5/32" Ndle Inject into the skin 5 times per day with insulin    promethazine (PHENERGAN) 25 MG suppository Place 1 suppository (25 mg total) rectally every 6 (six) hours as needed for Nausea.    [DISCONTINUED] atenoloL (TENORMIN) 50 MG tablet Take 1 tablet (50 mg total) by mouth every other day.    [DISCONTINUED] omeprazole (PRILOSEC) 20 MG capsule Take 2 capsules (40 mg total) by mouth once daily. for 10 days    [DISCONTINUED] torsemide (DEMADEX) 20 MG Tab Take 1 tablet (20 mg total) by mouth 2 (two) times a day. (Patient taking differently: Take 20 mg by mouth once daily.)     Family History       Problem Relation (Age of Onset)    Diabetes Mother, Father          Tobacco Use    Smoking status: Never    Smokeless tobacco: Never   Substance and Sexual Activity    Alcohol use: Not Currently    Drug use: No    Sexual activity: Not Currently     Partners: Male     Birth control/protection: I.U.D.     Review of Systems   Constitutional:  Negative for chills and fever.   HENT:  Negative for congestion and sore throat.    Eyes:  Positive for visual disturbance. Negative for discharge.   Respiratory:  Positive for shortness of breath. Negative for cough.    Cardiovascular:  Positive for chest pain. Negative for palpitations.   Gastrointestinal:  Positive for abdominal pain, nausea and vomiting.   Genitourinary:  Negative for flank pain and pelvic pain.   Musculoskeletal:  " Positive for back pain. Negative for neck pain.   Skin:  Negative for pallor and rash.   Neurological:  Positive for headaches. Negative for syncope.   Psychiatric/Behavioral:  Negative for agitation and confusion.    All other systems reviewed and are negative.  Objective:     Vital Signs (Most Recent):  Temp: 97.7 °F (36.5 °C) (02/07/23 0045)  Pulse: 82 (02/07/23 1125)  Resp: 20 (02/07/23 1002)  BP: 100/62 (02/07/23 1100)  SpO2: 95 % (02/07/23 1125) Vital Signs (24h Range):  Temp:  [97.7 °F (36.5 °C)-97.9 °F (36.6 °C)] 97.7 °F (36.5 °C)  Pulse:  [80-98] 82  Resp:  [20-22] 20  SpO2:  [92 %-100 %] 95 %  BP: (100-228)/() 100/62     Weight: 59.9 kg (132 lb)  Body mass index is 24.14 kg/m².    Physical Exam  Vitals and nursing note reviewed.   Constitutional:       General: She is not in acute distress.     Appearance: She is well-developed.   HENT:      Head: Normocephalic and atraumatic.      Mouth/Throat:      Mouth: Mucous membranes are dry.   Eyes:      General:         Right eye: No discharge.         Left eye: No discharge.   Cardiovascular:      Rate and Rhythm: Normal rate and regular rhythm.   Pulmonary:      Effort: Pulmonary effort is normal. No respiratory distress.      Breath sounds: Normal breath sounds.   Chest:      Comments: Tunneled catheter right chest wall  Abdominal:      General: Bowel sounds are normal.      Palpations: Abdomen is soft.      Tenderness: There is abdominal tenderness (generalized). There is no guarding.   Genitourinary:     Comments: Deferred    Musculoskeletal:         General: Normal range of motion.      Cervical back: Normal range of motion and neck supple.   Skin:     General: Skin is warm and dry.      Capillary Refill: Capillary refill takes 2 to 3 seconds.      Findings: No rash.   Neurological:      General: No focal deficit present.      Mental Status: She is alert and oriented to person, place, and time.   Psychiatric:         Mood and Affect: Mood normal.          Behavior: Behavior normal.         Thought Content: Thought content normal.           Significant Labs: All pertinent labs within the past 24 hours have been reviewed.  CBC:   Recent Labs   Lab 02/06/23 1951 02/07/23  0740   WBC 6.96 6.39   HGB 7.4* 8.2*   HCT 21.2* 23.6*   PLT 78* 71*     CMP:   Recent Labs   Lab 02/06/23 1951 02/07/23  0740   * 133*   K 3.6 3.2*   CL 94* 92*   CO2 23 21*   * 464*   BUN 43* 47*   CREATININE 5.8* 6.7*   CALCIUM 8.1* 8.0*   PROT 7.1 7.0   ALBUMIN 3.5 3.4*   BILITOT 2.4* 2.8*   ALKPHOS 136* 147*   AST 20 19   ALT 13 14   ANIONGAP 18* 20*     POCT Glucose:   Recent Labs   Lab 02/07/23  0807   POCTGLUCOSE 463*     Troponin:   Recent Labs   Lab 02/06/23 1951 02/07/23  0551   TROPONINI 0.053* 0.055*     Urine Studies: No results for input(s): COLORU, APPEARANCEUA, PHUR, SPECGRAV, PROTEINUA, GLUCUA, KETONESU, BILIRUBINUA, OCCULTUA, NITRITE, UROBILINOGEN, LEUKOCYTESUR, RBCUA, WBCUA, BACTERIA, SQUAMEPITHEL, HYALINECASTS in the last 48 hours.    Invalid input(s): HAY    Significant Imaging: I have reviewed all pertinent imaging results/findings within the past 24 hours.

## 2023-02-07 NOTE — H&P
[facility] Emergency Dept  [facility] Medicine  History & Physical    Patient Name: Tabby Howard  MRN: [id number]  Patient Class: OP- Observation  Admission Date: 2/7/2023  Attending Physician: Max Toledo MD   Primary Care Provider: [facility]         Patient information was obtained from patient, past medical records and ER records.     Subjective:     Principal Problem:Uncontrolled hypertension    Chief Complaint:   Chief Complaint   Patient presents with    Shortness of Breath     And pain all over; seen here earlier and no better        HPI: Tabby Howard is a 34 y/o F with a past medical history significant for ESRD on HD, DM, HFpEF, GERD, suspected gastroparesis, sickle cell trait, and HTN who presented to the ED this morning for further evaluation of abdominal pain, back pain, and CP.  She was seen in the ED last night with the same complaints and was discharged home; however, per report she stayed in the ED waiting room all night and signed in to be seen again this morning.  Labs were significant for her chronic end-stage renal disease, chronically elevated glucose, chronically elevated troponin, and chronically elevated BNP. Due to recent CT scan at another facility for the same complaints that showed no acute intra-abdominal abnormalities, no additional imaging was performed.  Pt has had multiple ED visits and hospital admissions over the past several months with similar complaints.  Blood pressure in the ED is uncontrolled 228/119; blood glucose 464; AG 20, CO2 21, potassium 3.2.  Due for dialysis today.  Still c/o chest pain.  Initial troponin 0.053 which is about her baseline.  No significant EKG changes.  She is placed in observation under the service of hospital medicine for continued monitoring and treatment.      Past Medical History:   Diagnosis Date    CKD (chronic kidney disease), stage IV 4/12/2022    Diabetes mellitus due to underlying condition  with unspecified complications 2022    Gastroparesis 2022    Heart failure with preserved ejection fraction 2022    EF 55% on 3/22    History of gastroesophageal reflux (GERD)     History of supraventricular tachycardia     Hyperkalemia 2022    Hypertensive emergency 2022    Sickle cell trait 2022    Type 2 diabetes mellitus        Past Surgical History:   Procedure Laterality Date     SECTION      x 3    COLONOSCOPY      COLONOSCOPY N/A 2022    Procedure: COLONOSCOPY;  Surgeon: Jagdeep Cedeno MD;  Location: [facility];  Service: Endoscopy;  Laterality: N/A;    ESOPHAGOGASTRODUODENOSCOPY N/A 10/18/2019    Procedure: ESOPHAGOGASTRODUODENOSCOPY (EGD);  Surgeon: Gianluca Mendez MD;  Location: [facility];  Service: Endoscopy;  Laterality: N/A;    ESOPHAGOGASTRODUODENOSCOPY N/A 2022    Procedure: EGD (ESOPHAGOGASTRODUODENOSCOPY);  Surgeon: Micky Paredes III, MD;  Location: [facility];  Service: Endoscopy;  Laterality: N/A;    ESOPHAGOGASTRODUODENOSCOPY N/A 2022    Procedure: EGD (ESOPHAGOGASTRODUODENOSCOPY);  Surgeon: Marcelo Zhong MD;  Location: [facility];  Service: Endoscopy;  Laterality: N/A;    LAPAROSCOPIC CHOLECYSTECTOMY N/A 2022    Procedure: CHOLECYSTECTOMY, LAPAROSCOPIC;  Surgeon: Grey Perez MD;  Location: [facility];  Service: General;  Laterality: N/A;    PLACEMENT OF DUAL-LUMEN VASCULAR CATHETER Left 2022    Procedure: INSERTION-CATHETER-JOSEPH;  Surgeon: Dionte Gan MD;  Location: [facility] OR;  Service: General;  Laterality: Left;    PLACEMENT OF DUAL-LUMEN VASCULAR CATHETER Right 2022    Procedure: INSERTION-CATHETER-Hemosplit;  Surgeon: Dionte Gan MD;  Location: [facility] OR;  Service: General;  Laterality: Right;       Review of patient's allergies indicates:   Allergen Reactions    Penicillins Hives       Current Facility-Administered Medications on File Prior to Encounter   Medication    [COMPLETED]  diphenhydrAMINE injection 25 mg    [COMPLETED] metoclopramide HCl injection 10 mg     Current Outpatient Medications on File Prior to Encounter   Medication Sig    albuterol (PROVENTIL/VENTOLIN HFA) 90 mcg/actuation inhaler Inhale 2 puffs into the lungs every 6 (six) hours as needed for Wheezing. Rescue    amLODIPine (NORVASC) 10 MG tablet Take 1 tablet (10 mg total) by mouth once daily.    apixaban (ELIQUIS) 5 mg Tab Take 5 mg by mouth 2 (two) times daily.    carvediloL (COREG) 25 MG tablet Take 1 tablet (25 mg total) by mouth 2 (two) times daily.    cloNIDine 0.2 mg/24 hr td ptwk (CATAPRES) 0.2 mg/24 hr Place 1 patch onto the skin every 7 days.    [] dicyclomine (BENTYL) 10 MG capsule Take 1 capsule (10 mg total) by mouth 3 (three) times daily.    ferrous sulfate 325 (65 FE) MG EC tablet Take 325 mg by mouth once daily.    FLUoxetine 20 MG capsule Take 1 capsule (20 mg total) by mouth once daily.    furosemide (LASIX) 40 MG tablet Take 40 mg by mouth 2 (two) times a day.    gabapentin (NEURONTIN) 100 MG capsule Take 1 capsule (100 mg total) by mouth 2 (two) times daily.    hydrALAZINE (APRESOLINE) 100 MG tablet Take 1 tablet (100 mg total) by mouth every 8 (eight) hours.    HYDROcodone-acetaminophen (NORCO)  mg per tablet Take 1 tablet by mouth every 4 (four) hours as needed for Pain.    HYDROmorphone (DILAUDID) 4 MG tablet Take 4 mg by mouth every 12 (twelve) hours as needed for Pain.    insulin (LANTUS SOLOSTAR U-100 INSULIN) glargine 100 units/mL SubQ pen Inject 16 Units into the skin every evening.    insulin aspart U-100 (NOVOLOG) 100 unit/mL (3 mL) InPn pen Inject 1-10 Units into the skin before meals and at bedtime as needed (Hyperglycemia). Max daily dose 40 units.    isosorbide mononitrate (IMDUR) 60 MG 24 hr tablet Take 2 tablets (120 mg total) by mouth once daily.    levETIRAcetam (KEPPRA) 500 MG Tab Take 1 tablet (500 mg total) by mouth 2 (two) times daily.     "losartan-hydrochlorothiazide 100-12.5 mg (HYZAAR) 100-12.5 mg Tab Take 1 tablet by mouth once daily.    mirtazapine (REMERON SOL-TAB) 15 MG disintegrating tablet Take 1 tablet (15 mg total) by mouth nightly.    ondansetron (ZOFRAN) 4 MG tablet Take 1 tablet (4 mg total) by mouth every 8 (eight) hours as needed for Nausea.    pantoprazole (PROTONIX) 40 MG tablet Take 40 mg by mouth once daily.    pen needle, diabetic (BD ULTRA-FINE MAGALIE PEN NEEDLE) 32 gauge x 5/32" Ndle Inject into the skin 5 times per day with insulin    promethazine (PHENERGAN) 25 MG suppository Place 1 suppository (25 mg total) rectally every 6 (six) hours as needed for Nausea.    [DISCONTINUED] atenoloL (TENORMIN) 50 MG tablet Take 1 tablet (50 mg total) by mouth every other day.    [DISCONTINUED] omeprazole (PRILOSEC) 20 MG capsule Take 2 capsules (40 mg total) by mouth once daily. for 10 days    [DISCONTINUED] torsemide (DEMADEX) 20 MG Tab Take 1 tablet (20 mg total) by mouth 2 (two) times a day. (Patient taking differently: Take 20 mg by mouth once daily.)     Family History       Problem Relation (Age of Onset)    Diabetes Mother, Father          Tobacco Use    Smoking status: Never    Smokeless tobacco: Never   Substance and Sexual Activity    Alcohol use: Not Currently    Drug use: No    Sexual activity: Not Currently     Partners: Male     Birth control/protection: I.U.D.     Review of Systems   Constitutional:  Negative for chills and fever.   HENT:  Negative for congestion and sore throat.    Eyes:  Positive for visual disturbance. Negative for discharge.   Respiratory:  Positive for shortness of breath. Negative for cough.    Cardiovascular:  Positive for chest pain. Negative for palpitations.   Gastrointestinal:  Positive for abdominal pain, nausea and vomiting.   Genitourinary:  Negative for flank pain and pelvic pain.   Musculoskeletal:  Positive for back pain. Negative for neck pain.   Skin:  Negative for pallor and " rash.   Neurological:  Positive for headaches. Negative for syncope.   Psychiatric/Behavioral:  Negative for agitation and confusion.    All other systems reviewed and are negative.  Objective:     Vital Signs (Most Recent):  Temp: 97.7 °F (36.5 °C) (02/07/23 0045)  Pulse: 82 (02/07/23 1125)  Resp: 20 (02/07/23 1002)  BP: 100/62 (02/07/23 1100)  SpO2: 95 % (02/07/23 1125) Vital Signs (24h Range):  Temp:  [97.7 °F (36.5 °C)-97.9 °F (36.6 °C)] 97.7 °F (36.5 °C)  Pulse:  [80-98] 82  Resp:  [20-22] 20  SpO2:  [92 %-100 %] 95 %  BP: (100-228)/() 100/62     Weight: 59.9 kg (132 lb)  Body mass index is 24.14 kg/m².    Physical Exam  Vitals and nursing note reviewed.   Constitutional:       General: She is not in acute distress.     Appearance: She is well-developed.   HENT:      Head: Normocephalic and atraumatic.      Mouth/Throat:      Mouth: Mucous membranes are dry.   Eyes:      General:         Right eye: No discharge.         Left eye: No discharge.   Cardiovascular:      Rate and Rhythm: Normal rate and regular rhythm.   Pulmonary:      Effort: Pulmonary effort is normal. No respiratory distress.      Breath sounds: Normal breath sounds.   Chest:      Comments: Tunneled catheter right chest wall  Abdominal:      General: Bowel sounds are normal.      Palpations: Abdomen is soft.      Tenderness: There is abdominal tenderness (generalized). There is no guarding.   Genitourinary:     Comments: Deferred    Musculoskeletal:         General: Normal range of motion.      Cervical back: Normal range of motion and neck supple.   Skin:     General: Skin is warm and dry.      Capillary Refill: Capillary refill takes 2 to 3 seconds.      Findings: No rash.   Neurological:      General: No focal deficit present.      Mental Status: She is alert and oriented to person, place, and time.   Psychiatric:         Mood and Affect: Mood normal.         Behavior: Behavior normal.         Thought Content: Thought content normal.            Significant Labs: All pertinent labs within the past 24 hours have been reviewed.  CBC:   Recent Labs   Lab 02/06/23 1951 02/07/23  0740   WBC 6.96 6.39   HGB 7.4* 8.2*   HCT 21.2* 23.6*   PLT 78* 71*     CMP:   Recent Labs   Lab 02/06/23 1951 02/07/23  0740   * 133*   K 3.6 3.2*   CL 94* 92*   CO2 23 21*   * 464*   BUN 43* 47*   CREATININE 5.8* 6.7*   CALCIUM 8.1* 8.0*   PROT 7.1 7.0   ALBUMIN 3.5 3.4*   BILITOT 2.4* 2.8*   ALKPHOS 136* 147*   AST 20 19   ALT 13 14   ANIONGAP 18* 20*     POCT Glucose:   Recent Labs   Lab 02/07/23  0807   POCTGLUCOSE 463*     Troponin:   Recent Labs   Lab 02/06/23 1951 02/07/23  0551   TROPONINI 0.053* 0.055*     Urine Studies: No results for input(s): COLORU, APPEARANCEUA, PHUR, SPECGRAV, PROTEINUA, GLUCUA, KETONESU, BILIRUBINUA, OCCULTUA, NITRITE, UROBILINOGEN, LEUKOCYTESUR, RBCUA, WBCUA, BACTERIA, SQUAMEPITHEL, HYALINECASTS in the last 48 hours.    Invalid input(s): WRIGHTSUR    Significant Imaging: I have reviewed all pertinent imaging results/findings within the past 24 hours.    Assessment/Plan:     * Uncontrolled hypertension  Chronic, uncontrolled.  Latest blood pressure and vitals reviewed-   Temp:  [97.7 °F (36.5 °C)-97.9 °F (36.6 °C)]   Pulse:  [80-98]   Resp:  [20-22]   BP: (100-228)/()   SpO2:  [92 %-100 %] .   Home meds for hypertension were reviewed and noted below.   Hypertension Medications             amLODIPine (NORVASC) 10 MG tablet Take 1 tablet (10 mg total) by mouth once daily.    carvediloL (COREG) 25 MG tablet Take 1 tablet (25 mg total) by mouth 2 (two) times daily.    cloNIDine 0.2 mg/24 hr td ptwk (CATAPRES) 0.2 mg/24 hr Place 1 patch onto the skin every 7 days.    furosemide (LASIX) 40 MG tablet Take 40 mg by mouth 2 (two) times a day.    hydrALAZINE (APRESOLINE) 100 MG tablet Take 1 tablet (100 mg total) by mouth every 8 (eight) hours.    isosorbide mononitrate (IMDUR) 60 MG 24 hr tablet Take 2 tablets (120 mg total) by  mouth once daily.    losartan-hydrochlorothiazide 100-12.5 mg (HYZAAR) 100-12.5 mg Tab Take 1 tablet by mouth once daily.          While in the hospital, will manage blood pressure as follows; Continue home antihypertensive regimen-uncontrolled likely 2/2 noncompliance.    Will utilize p.r.n. blood pressure medication only if patient's blood pressure greater than  180/110 and she develops symptoms such as worsening chest pain or shortness of breath.        Demand ischemia  Trend troponin  Monitor telemetry  EKG for any incidence of chest pain      DM (diabetes mellitus)  Patient's FSGs are uncontrolled due to hyperglycemia on current medication regimen.  Last A1c reviewed-   Lab Results   Component Value Date    HGBA1C 7.5 (H) 12/23/2022     Most recent fingerstick glucose reviewed-   Recent Labs   Lab 02/07/23  0807   POCTGLUCOSE 463*     Current correctional scale  Medium  Maintain anti-hyperglycemic dose as follows-   Antihyperglycemics (From admission, onward)    Start     Stop Route Frequency Ordered    02/07/23 1400  insulin detemir U-100 pen 8 Units         -- SubQ Once 02/07/23 1253    02/07/23 1251  insulin aspart U-100 pen 1-10 Units         -- SubQ Before meals & nightly PRN 02/07/23 1251        Hold Oral hypoglycemics while patient is in the hospital.    Drug-seeking behavior  Chronic behavior; avoid opiates while hospitalized      Anemia of chronic kidney failure, stage 5  Patient's anemia is currently controlled. S/p 0 units of PRBCs.  Current CBC reviewed-   Lab Results   Component Value Date    HGB 8.2 (L) 02/07/2023    HCT 23.6 (L) 02/07/2023    MCV 82 02/07/2023    PLT 71 (L) 02/07/2023     Monitor serial CBC and transfuse if patient becomes hemodynamically unstable, symptomatic or H/H drops below 7/21.         Thrombocytopenia  Chronic; stable      Deep vein thrombosis (DVT) of non-extremity vein  Continue chronic apixaban      ESRD (end stage renal disease) on dialysis  Consult nephrology;  continue chronic dialysis-Tu/Th/Sa      Seizure disorder  Noted; continue chronic keppra; fall/seizure/aspiration precautions      Sickle cell trait  Noted        Gastroparesis - suspected, unconfirmed  Continue bentyl tid  Compazine/reglan prn N/V      VTE Risk Mitigation (From admission, onward)    Apixaban             JACQUELINE Sheets  Department of Hospital Medicine   [facility] - Emergency Dept

## 2023-02-07 NOTE — ASSESSMENT & PLAN NOTE
Chronic, uncontrolled.  Latest blood pressure and vitals reviewed-   Temp:  [97.7 °F (36.5 °C)-97.9 °F (36.6 °C)]   Pulse:  [80-98]   Resp:  [20-22]   BP: (100-228)/()   SpO2:  [92 %-100 %] .   Home meds for hypertension were reviewed and noted below.   Hypertension Medications             amLODIPine (NORVASC) 10 MG tablet Take 1 tablet (10 mg total) by mouth once daily.    carvediloL (COREG) 25 MG tablet Take 1 tablet (25 mg total) by mouth 2 (two) times daily.    cloNIDine 0.2 mg/24 hr td ptwk (CATAPRES) 0.2 mg/24 hr Place 1 patch onto the skin every 7 days.    furosemide (LASIX) 40 MG tablet Take 40 mg by mouth 2 (two) times a day.    hydrALAZINE (APRESOLINE) 100 MG tablet Take 1 tablet (100 mg total) by mouth every 8 (eight) hours.    isosorbide mononitrate (IMDUR) 60 MG 24 hr tablet Take 2 tablets (120 mg total) by mouth once daily.    losartan-hydrochlorothiazide 100-12.5 mg (HYZAAR) 100-12.5 mg Tab Take 1 tablet by mouth once daily.          While in the hospital, will manage blood pressure as follows; Continue home antihypertensive regimen-uncontrolled likely 2/2 noncompliance.    Will utilize p.r.n. blood pressure medication only if patient's blood pressure greater than  180/110 and she develops symptoms such as worsening chest pain or shortness of breath.

## 2023-02-07 NOTE — ED NOTES
"Representative from dialysis called (currently @ [facility]); "aware of the orders, will be there as soon as we can". No sig change--will continue to monitor  "

## 2023-02-07 NOTE — FIRST PROVIDER EVALUATION
Emergency Department TeleTriage Encounter Note      CHIEF COMPLAINT    Chief Complaint   Patient presents with    Back Pain    Chest Pain     Started this am     Abdominal Pain       VITAL SIGNS   Initial Vitals [02/06/23 1817]   BP Pulse Resp Temp SpO2   (!) 187/110 93 20 97.9 °F (36.6 °C) 100 %      MAP       --            ALLERGIES    Review of patient's allergies indicates:   Allergen Reactions    Penicillins Hives       PROVIDER TRIAGE NOTE  Patient complaining of severe pain in the abdomen, back and chest. Chest pain started this morning. She was seen for abdominal pain yesterday in the ED with extensive work-up.       ORDERS  Labs Reviewed - No data to display    ED Orders (720h ago, onward)      None              Virtual Visit Note: The provider triage portion of this emergency department evaluation and documentation was performed via [employer], a HIPAA-compliant telemedicine application, in concert with a tele-presenter in the room. A face to face patient evaluation with one of my colleagues will occur once the patient is placed in an emergency department room.      DISCLAIMER: This note was prepared with [employer] voice recognition transcription software. Garbled syntax, mangled pronouns, and other bizarre constructions may be attributed to that software system.

## 2023-02-07 NOTE — CONSULTS
INPATIENT NEPHROLOGY CONSULT   [facility] NEPHROLOGY    Tabby Howard  02/07/2023    Reason for consultation:    esrd    Chief Complaint:   Chief Complaint   Patient presents with    Shortness of Breath     And pain all over; seen here earlier and no better          History of Present Illness:     Tabby Howard is a 32 y/o F with a past medical history significant for ESRD on HD, DM, HFpEF, GERD, suspected gastroparesis, sickle cell trait, and HTN who presented to the ED this morning for further evaluation of abdominal pain, back pain, and CP.  She was seen in the ED last night with the same complaints and was discharged home; however, per report she stayed in the ED waiting room all night and signed in to be seen again this morning.  Labs were significant for her chronic end-stage renal disease, chronically elevated glucose, chronically elevated troponin, and chronically elevated BNP. Due to recent CT scan at another facility for the same complaints that showed no acute intra-abdominal abnormalities, no additional imaging was performed.  Pt has had multiple ED visits and hospital admissions over the past several months with similar complaints.  Blood pressure in the ED is uncontrolled 228/119; blood glucose 464; AG 20, CO2 21, potassium 3.2.  Due for dialysis today.  Still c/o chest pain.  Initial troponin 0.053 which is about her baseline.  No significant EKG changes.  She is placed in observation under the service of hospital medicine for continued monitoring and treatment.        Plan of Care:       Assessment:    esrd  --continue dialysis per routine  --Patient seen on hemodialysis for uremic clearance and ultrafiltration.      --fluid restrict  --renal dose medication per routine  --continue outpt medication  --continue binders with meals    Anemia  --erythropoiesis stimulating agent with renal replacement therapy    Hyperphosphatemia  --renal diet  --continue binders    Hypertension  --uf with hd  --fluid  restrict  --low salt diet  --continue home medication           Thank you for allowing us to participate in this patient's care. We will continue to follow.    Vital Signs:  Temp Readings from Last 3 Encounters:   23 97.3 °F (36.3 °C) (Axillary)   23 97.9 °F (36.6 °C) (Oral)   23 97.6 °F (36.4 °C) (Oral)       Pulse Readings from Last 3 Encounters:   23 73   23 98   23 90       BP Readings from Last 3 Encounters:   23 92/66   23 (!) 187/110   23 (!) 136/94       Weight:  Wt Readings from Last 3 Encounters:   23 59.9 kg (132 lb)   23 59.9 kg (132 lb)   23 59.9 kg (132 lb)       Past Medical & Surgical History:  Past Medical History:   Diagnosis Date    CKD (chronic kidney disease), stage IV 2022    Diabetes mellitus due to underlying condition with unspecified complications 2022    Gastroparesis 2022    Heart failure with preserved ejection fraction 2022    EF 55% on 3/22    History of gastroesophageal reflux (GERD)     History of supraventricular tachycardia     Hyperkalemia 2022    Hypertensive emergency 2022    Sickle cell trait 2022    Type 2 diabetes mellitus        Past Surgical History:   Procedure Laterality Date     SECTION      x 3    COLONOSCOPY      COLONOSCOPY N/A 2022    Procedure: COLONOSCOPY;  Surgeon: Jagdeep Cedeno MD;  Location: [facility];  Service: Endoscopy;  Laterality: N/A;    ESOPHAGOGASTRODUODENOSCOPY N/A 10/18/2019    Procedure: ESOPHAGOGASTRODUODENOSCOPY (EGD);  Surgeon: Gianluca Mendez MD;  Location: [facility];  Service: Endoscopy;  Laterality: N/A;    ESOPHAGOGASTRODUODENOSCOPY N/A 2022    Procedure: EGD (ESOPHAGOGASTRODUODENOSCOPY);  Surgeon: Micky Paredes III, MD;  Location: [facility];  Service: Endoscopy;  Laterality: N/A;    ESOPHAGOGASTRODUODENOSCOPY N/A 2022    Procedure: EGD (ESOPHAGOGASTRODUODENOSCOPY);  Surgeon: Marcelo Zhong MD;  Location:  [facility];  Service: Endoscopy;  Laterality: N/A;    LAPAROSCOPIC CHOLECYSTECTOMY N/A 07/30/2022    Procedure: CHOLECYSTECTOMY, LAPAROSCOPIC;  Surgeon: Grey Perez MD;  Location: [facility] OR;  Service: General;  Laterality: N/A;    PLACEMENT OF DUAL-LUMEN VASCULAR CATHETER Left 07/12/2022    Procedure: INSERTION-CATHETER-JOSEPH;  Surgeon: Dionte Gan MD;  Location: [facility] OR;  Service: General;  Laterality: Left;    PLACEMENT OF DUAL-LUMEN VASCULAR CATHETER Right 07/26/2022    Procedure: INSERTION-CATHETER-Hemosplit;  Surgeon: Dionte Gan MD;  Location: [facility] OR;  Service: General;  Laterality: Right;       Past Social History:  Social History     Socioeconomic History    Marital status:    Tobacco Use    Smoking status: Never    Smokeless tobacco: Never   Substance and Sexual Activity    Alcohol use: Not Currently    Drug use: No    Sexual activity: Not Currently     Partners: Male     Birth control/protection: I.U.D.     Social Determinants of Health     Financial Resource Strain: Unknown    Difficulty of Paying Living Expenses: Patient refused   Food Insecurity: Unknown    Worried About Running Out of Food in the Last Year: Patient refused    Ran Out of Food in the Last Year: Patient refused   Transportation Needs: Unmet Transportation Needs    Lack of Transportation (Medical): Yes    Lack of Transportation (Non-Medical): Yes   Physical Activity: Inactive    Days of Exercise per Week: 0 days    Minutes of Exercise per Session: 0 min   Stress: Unknown    Feeling of Stress : Patient refused   Social Connections: Unknown    Frequency of Communication with Friends and Family: More than three times a week    Frequency of Social Gatherings with Friends and Family: More than three times a week    Attends [employer] Services: Patient refused    Active Member of Clubs or Organizations: Patient refused    Attends Club or Organization Meetings: Patient refused    Marital Status: Patient refused   Housing  Stability: High Risk    Unable to Pay for Housing in the Last Year: Patient refused    Unstable Housing in the Last Year: Yes       Medications:  Current Facility-Administered Medications on File Prior to Encounter   Medication Dose Route Frequency Provider Last Rate Last Admin    [COMPLETED] diphenhydrAMINE injection 25 mg  25 mg Intramuscular ED 1 Time Bryan Brooke MD   25 mg at 23    [COMPLETED] metoclopramide HCl injection 10 mg  10 mg Intramuscular ED 1 Time Bryan Brooke MD   10 mg at 23     Current Outpatient Medications on File Prior to Encounter   Medication Sig Dispense Refill    albuterol (PROVENTIL/VENTOLIN HFA) 90 mcg/actuation inhaler Inhale 2 puffs into the lungs every 6 (six) hours as needed for Wheezing. Rescue 18 g 3    amLODIPine (NORVASC) 10 MG tablet Take 1 tablet (10 mg total) by mouth once daily. 30 tablet 11    apixaban (ELIQUIS) 5 mg Tab Take 5 mg by mouth 2 (two) times daily.      carvediloL (COREG) 25 MG tablet Take 1 tablet (25 mg total) by mouth 2 (two) times daily. 60 tablet 2    cloNIDine 0.2 mg/24 hr td ptwk (CATAPRES) 0.2 mg/24 hr Place 1 patch onto the skin every 7 days. 4 patch 2    [] dicyclomine (BENTYL) 10 MG capsule Take 1 capsule (10 mg total) by mouth 3 (three) times daily. 90 capsule 0    ferrous sulfate 325 (65 FE) MG EC tablet Take 325 mg by mouth once daily.      FLUoxetine 20 MG capsule Take 1 capsule (20 mg total) by mouth once daily. 30 capsule 11    furosemide (LASIX) 40 MG tablet Take 40 mg by mouth 2 (two) times a day.      gabapentin (NEURONTIN) 100 MG capsule Take 1 capsule (100 mg total) by mouth 2 (two) times daily. 90 capsule 2    hydrALAZINE (APRESOLINE) 100 MG tablet Take 1 tablet (100 mg total) by mouth every 8 (eight) hours. 90 tablet 2    HYDROcodone-acetaminophen (NORCO)  mg per tablet Take 1 tablet by mouth every 4 (four) hours as needed for Pain.      HYDROmorphone (DILAUDID) 4 MG tablet Take 4 mg by mouth  "every 12 (twelve) hours as needed for Pain.      insulin (LANTUS SOLOSTAR U-100 INSULIN) glargine 100 units/mL SubQ pen Inject 16 Units into the skin every evening.      insulin aspart U-100 (NOVOLOG) 100 unit/mL (3 mL) InPn pen Inject 1-10 Units into the skin before meals and at bedtime as needed (Hyperglycemia). Max daily dose 40 units. 3 mL 6    isosorbide mononitrate (IMDUR) 60 MG 24 hr tablet Take 2 tablets (120 mg total) by mouth once daily. 30 tablet 11    levETIRAcetam (KEPPRA) 500 MG Tab Take 1 tablet (500 mg total) by mouth 2 (two) times daily. 60 tablet 11    losartan-hydrochlorothiazide 100-12.5 mg (HYZAAR) 100-12.5 mg Tab Take 1 tablet by mouth once daily.      mirtazapine (REMERON SOL-TAB) 15 MG disintegrating tablet Take 1 tablet (15 mg total) by mouth nightly. 90 tablet 0    ondansetron (ZOFRAN) 4 MG tablet Take 1 tablet (4 mg total) by mouth every 8 (eight) hours as needed for Nausea. 60 tablet 2    pantoprazole (PROTONIX) 40 MG tablet Take 40 mg by mouth once daily.      pen needle, diabetic (BD ULTRA-FINE MAGALIE PEN NEEDLE) 32 gauge x 5/32" Ndle Inject into the skin 5 times per day with insulin 100 each 12    promethazine (PHENERGAN) 25 MG suppository Place 1 suppository (25 mg total) rectally every 6 (six) hours as needed for Nausea. 10 suppository 0    [DISCONTINUED] atenoloL (TENORMIN) 50 MG tablet Take 1 tablet (50 mg total) by mouth every other day. 45 tablet 3    [DISCONTINUED] omeprazole (PRILOSEC) 20 MG capsule Take 2 capsules (40 mg total) by mouth once daily. for 10 days 20 capsule 0    [DISCONTINUED] torsemide (DEMADEX) 20 MG Tab Take 1 tablet (20 mg total) by mouth 2 (two) times a day. (Patient taking differently: Take 20 mg by mouth once daily.) 60 tablet 1     Scheduled Meds:   insulin detemir U-100  8 Units Subcutaneous Once    mupirocin   Nasal BID     Continuous Infusions:  PRN Meds:.insulin aspart U-100, metoclopramide HCl, prochlorperazine    Allergies:  Penicillins    Past Family " "History:  Reviewed; refer to Hospitalist Admission Note    Review of Systems:  Review of Systems - All 14 systems reviewed and negative, except as noted in HPI    Physical Exam:    BP 92/66 (BP Location: Left arm, Patient Position: Lying)   Pulse 73   Temp 97.3 °F (36.3 °C) (Axillary)   Resp 16   Ht 5' 2" (1.575 m)   Wt 59.9 kg (132 lb)   SpO2 (!) 94%   Breastfeeding No   BMI 24.14 kg/m²     General Appearance:    Sleeping.  Quiet.     Head:    Normocephalic, without obvious abnormality, atraumatic   Eyes:    PER, conjunctiva/corneas clear, EOM's intact in both eyes        Throat:   Lips, mucosa, and tongue normal; teeth and gums normal   Back:     Symmetric, no curvature, ROM normal, no CVA tenderness   Lungs:     Clear to auscultation bilaterally, respirations unlabored   Chest wall:    No tenderness or deformity   Heart:    Regular rate and rhythm, S1 and S2 normal, no murmur, rub   or gallop   Abdomen:     Soft, non-tender, bowel sounds active all four quadrants,     no masses, no organomegaly   Extremities:   Extremities normal, atraumatic, no cyanosis or edema   Pulses:   2+ and symmetric all extremities   MSK:   No joint or muscle swelling, tenderness or deformity   Skin:   Skin color, texture, turgor normal, no rashes or lesions   Neurologic:   CNII-XII intact, normal strength and sensation       Throughout.  No flap     Results:  Lab Results   Component Value Date     02/07/2023    K 3.5 02/07/2023    CL 97 02/07/2023    CO2 27 02/07/2023    BUN 52 (H) 02/07/2023    CREATININE 6.7 (H) 02/07/2023    CALCIUM 7.6 (L) 02/07/2023    ANIONGAP 14 02/07/2023    ESTGFRAFRICA 20 (A) 07/31/2022    EGFRNONAA 18 (A) 07/31/2022       Lab Results   Component Value Date    CALCIUM 7.6 (L) 02/07/2023    PHOS 4.4 01/27/2023       Recent Labs   Lab 02/07/23  0740   WBC 6.39   RBC 2.87*   HGB 8.2*   HCT 23.6*   PLT 71*   MCV 82   MCH 28.6   MCHC 34.7          I have personally reviewed pertinent radiological " imaging and reports.    Patient care was time spent personally by me on the following activities:   Obtaining a history  Examination of patient.  Providing medical care at the patients bedside.  Developing a treatment plan with patient or surrogate and bedside caregivers  Ordering and reviewing laboratory studies, radiographic studies, pulse oximetry.  Ordering and performing treatments and interventions.  Evaluation of patient's response to treatment.  Discussions with consultants while on the unit and immediately available to the patient.  Re-evaluation of the patient's condition.  Documentation in the medical record.     Hugh Figueroa MD  Nephrology  [facility] [place]  [phone]

## 2023-02-07 NOTE — ED TRIAGE NOTES
Tabby Howard is here with pain all over and vomiting and SOB and other complaints, seen here for same and treated and discharged and has no ride home and no relief.

## 2023-02-07 NOTE — ED NOTES
Assumed care - writhing/moaning, but able to engage in conversation and divert the behaviors while actively engaged. Chronically ill patient, seen yesterday and returns this AM

## 2023-02-07 NOTE — ASSESSMENT & PLAN NOTE
Patient's FSGs are uncontrolled due to hyperglycemia on current medication regimen.  Last A1c reviewed-   Lab Results   Component Value Date    HGBA1C 7.5 (H) 12/23/2022     Most recent fingerstick glucose reviewed-   Recent Labs   Lab 02/07/23  0807   POCTGLUCOSE 463*     Current correctional scale  Medium  Maintain anti-hyperglycemic dose as follows-   Antihyperglycemics (From admission, onward)    Start     Stop Route Frequency Ordered    02/07/23 1400  insulin detemir U-100 pen 8 Units         -- SubQ Once 02/07/23 1253    02/07/23 1251  insulin aspart U-100 pen 1-10 Units         -- SubQ Before meals & nightly PRN 02/07/23 1251        Hold Oral hypoglycemics while patient is in the hospital.

## 2023-02-07 NOTE — PLAN OF CARE
Attempted to complete DC assessment, pt refusing to answer questions at this time.     02/07/23 1037   Discharge Assessment   Assessment Type Discharge Planning Assessment

## 2023-02-07 NOTE — ASSESSMENT & PLAN NOTE
Patient's anemia is currently controlled. S/p 0 units of PRBCs.  Current CBC reviewed-   Lab Results   Component Value Date    HGB 8.2 (L) 02/07/2023    HCT 23.6 (L) 02/07/2023    MCV 82 02/07/2023    PLT 71 (L) 02/07/2023     Monitor serial CBC and transfuse if patient becomes hemodynamically unstable, symptomatic or H/H drops below 7/21.

## 2023-02-07 NOTE — HPI
Tabby Howard is a 34 y/o F with a past medical history significant for ESRD on HD, DM, HFpEF, GERD, suspected gastroparesis, sickle cell trait, and HTN who presented to the ED this morning for further evaluation of abdominal pain, back pain, and CP.  She was seen in the ED last night with the same complaints and was discharged home; however, per report she stayed in the ED waiting room all night and signed in to be seen again this morning.  Labs were significant for her chronic end-stage renal disease, chronically elevated glucose, chronically elevated troponin, and chronically elevated BNP. Due to recent CT scan at another facility for the same complaints that showed no acute intra-abdominal abnormalities, no additional imaging was performed.  Pt has had multiple ED visits and hospital admissions over the past several months with similar complaints.  Blood pressure in the ED is uncontrolled 228/119; blood glucose 464; AG 20, CO2 21, potassium 3.2.  Due for dialysis today.  Still c/o chest pain.  Initial troponin 0.053 which is about her baseline.  No significant EKG changes.  She is placed in observation under the service of hospital medicine for continued monitoring and treatment.

## 2023-02-07 NOTE — ED PROVIDER NOTES
Encounter Date: 2/7/2023    SCRIBE #1 NOTE: ICharissa, nini scribing for, and in the presence of,  Everton Chamberlain MD.     History     Chief Complaint   Patient presents with    Shortness of Breath     And pain all over; seen here earlier and no better     Time seen by provider: 2:41 AM on 02/07/2023    Tabby Howard is a 33 y.o. female who presents to the ED with an onset of abdominal pain, back pain, and CP. Patient presented to ED yesterday with similar complaints. EKG showed no acute abnormalities. Labs were significant for her chronic end-stage renal disease, chronically elevated glucose, chronically elevated troponin, and chronically elevated BNP. Due to recent CT scan at another facility for the same complaints that showed no acute intra-abdominal abnormalities, radiologic imaging was deemed unnecessary. The patient was treated with a dose of parental Reglan and parental Benadryl and discharged home with encouragement to follow-up with her GI physician for further care. She has returned early this morning for persistent Sx. She states she experiences these episodes of abdominal pain frequently. The patient denies fever, or any other symptoms at this time. She does not make urine and is dialyzed on T Th and Sat. Last dialysis was 3 days ago. Denies marijuana use. PMHx of DM type 2, supraventricular tachycardia, GERD,  Heart failure with preserved ejection fraction (EF 55% on 3/22), sickle cell trait, gastroparesis, CKD, hyperkalemia and HTN. PSHx of C section, EGD, cholecystectomy.    The history is provided by the patient.   Review of patient's allergies indicates:   Allergen Reactions    Penicillins Hives     Past Medical History:   Diagnosis Date    CKD (chronic kidney disease), stage IV 4/12/2022    Diabetes mellitus due to underlying condition with unspecified complications 4/12/2022    Gastroparesis 4/12/2022    Heart failure with preserved ejection fraction 4/12/2022    EF 55% on 3/22    History  of gastroesophageal reflux (GERD)     History of supraventricular tachycardia     Hyperkalemia 2022    Hypertensive emergency 2022    Sickle cell trait 2022    Type 2 diabetes mellitus      Past Surgical History:   Procedure Laterality Date     SECTION      x 3    COLONOSCOPY      COLONOSCOPY N/A 2022    Procedure: COLONOSCOPY;  Surgeon: Jagdeep Cedeno MD;  Location: [facility];  Service: Endoscopy;  Laterality: N/A;    ESOPHAGOGASTRODUODENOSCOPY N/A 10/18/2019    Procedure: ESOPHAGOGASTRODUODENOSCOPY (EGD);  Surgeon: Gianluca Mendez MD;  Location: [facility];  Service: Endoscopy;  Laterality: N/A;    ESOPHAGOGASTRODUODENOSCOPY N/A 2022    Procedure: EGD (ESOPHAGOGASTRODUODENOSCOPY);  Surgeon: Micky Paredes III, MD;  Location: [facility];  Service: Endoscopy;  Laterality: N/A;    ESOPHAGOGASTRODUODENOSCOPY N/A 2022    Procedure: EGD (ESOPHAGOGASTRODUODENOSCOPY);  Surgeon: Marcelo Zhong MD;  Location: [facility];  Service: Endoscopy;  Laterality: N/A;    LAPAROSCOPIC CHOLECYSTECTOMY N/A 2022    Procedure: CHOLECYSTECTOMY, LAPAROSCOPIC;  Surgeon: Grey Perez MD;  Location: [facility];  Service: General;  Laterality: N/A;    PLACEMENT OF DUAL-LUMEN VASCULAR CATHETER Left 2022    Procedure: INSERTION-CATHETER-JOSEPH;  Surgeon: Dionte Gan MD;  Location: [facility];  Service: General;  Laterality: Left;    PLACEMENT OF DUAL-LUMEN VASCULAR CATHETER Right 2022    Procedure: INSERTION-CATHETER-Hemosplit;  Surgeon: Dionte Gan MD;  Location: [facility];  Service: General;  Laterality: Right;     Family History   Problem Relation Age of Onset    Diabetes Mother     Diabetes Father      Social History     Tobacco Use    Smoking status: Never    Smokeless tobacco: Never   Substance Use Topics    Alcohol use: Not Currently    Drug use: No     Review of Systems   Constitutional:  Negative for fever.   HENT:  Negative for sore throat.    Respiratory:  Negative for  shortness of breath.    Cardiovascular:  Positive for chest pain.   Gastrointestinal:  Positive for abdominal pain. Negative for nausea.   Genitourinary:  Positive for difficulty urinating. Negative for dysuria.   Musculoskeletal:  Positive for back pain.   Skin:  Negative for rash.   Neurological:  Negative for weakness.   Hematological:  Does not bruise/bleed easily.     Physical Exam     Initial Vitals [02/07/23 0045]   BP Pulse Resp Temp SpO2   (!) 197/101 94 (!) 22 97.7 °F (36.5 °C) 98 %      MAP       --         Physical Exam    Nursing note and vitals reviewed.  Constitutional: She appears well-developed. She is not diaphoretic. No distress.   Patient resting comfortably but complains of pain on exam.   HENT:   Head: Normocephalic and atraumatic.   Right Ear: External ear normal.   Left Ear: External ear normal.   Nose: Nose normal.   Eyes: Conjunctivae are normal. No scleral icterus.   Neck: Neck supple. No tracheal deviation present.   Normal range of motion.  Cardiovascular:  Normal rate, regular rhythm and normal heart sounds.           Pulmonary/Chest: Breath sounds normal. No respiratory distress.   Vascular catheter without surrounding erythema or tenderness to palpation.   Abdominal: Abdomen is soft. Bowel sounds are normal. There is no abdominal tenderness.   Musculoskeletal:      Cervical back: Normal range of motion and neck supple.     Neurological: She is alert and oriented to person, place, and time.   Skin: Skin is warm and dry.   No jaundice   Psychiatric: She has a normal mood and affect. Thought content normal.       ED Course   Procedures  Labs Reviewed   TROPONIN I - Abnormal; Notable for the following components:       Result Value    Troponin I 0.055 (*)     All other components within normal limits   CBC W/ AUTO DIFFERENTIAL - Abnormal; Notable for the following components:    RBC 2.87 (*)     Hemoglobin 8.2 (*)     Hematocrit 23.6 (*)     RDW 17.7 (*)     Platelets 71 (*)     Immature  Granulocytes 0.8 (*)     Immature Grans (Abs) 0.05 (*)     Lymph # 0.9 (*)     Gran % 74.7 (*)     Lymph % 13.6 (*)     All other components within normal limits   COMPREHENSIVE METABOLIC PANEL - Abnormal; Notable for the following components:    Sodium 133 (*)     Potassium 3.2 (*)     Chloride 92 (*)     CO2 21 (*)     Glucose 464 (*)     BUN 47 (*)     Creatinine 6.7 (*)     Calcium 8.0 (*)     Albumin 3.4 (*)     Total Bilirubin 2.8 (*)     Alkaline Phosphatase 147 (*)     Anion Gap 20 (*)     eGFR 8 (*)     All other components within normal limits    Narrative:      Glu  critical result(s) called and verbal readback obtained from   Dannielle Trevino RN ED by [facility] 02/07/2023 08:24   BETA - HYDROXYBUTYRATE, SERUM - Abnormal; Notable for the following components:    Beta-Hydroxybutyrate 2.1 (*)     All other components within normal limits   BASIC METABOLIC PANEL - Abnormal; Notable for the following components:    Glucose 181 (*)     BUN 52 (*)     Creatinine 6.7 (*)     Calcium 7.6 (*)     eGFR 8 (*)     All other components within normal limits   TROPONIN I - Abnormal; Notable for the following components:    Troponin I 0.052 (*)     All other components within normal limits   POCT GLUCOSE - Abnormal; Notable for the following components:    POCT Glucose 463 (*)     All other components within normal limits   LIPASE   HEPATITIS B SURFACE ANTIGEN   HEPATITIS B SURFACE ANTIBODY   HEPATITIS B CORE ANTIBODY, TOTAL   URINALYSIS, REFLEX TO URINE CULTURE   POCT GLUCOSE, HAND-HELD DEVICE   ISTAT PROCEDURE   POCT GLUCOSE MONITORING CONTINUOUS        ECG Results              Repeat EKG 12-lead (In process)  Result time 02/07/23 07:43:24      In process by Interface, Lab In [facility] (02/07/23 07:43:24)                   Narrative:    Test Reason : R07.9,    Vent. Rate : 095 BPM     Atrial Rate : 095 BPM     P-R Int : 166 ms          QRS Dur : 084 ms      QT Int : 410 ms       P-R-T Axes : 063 096 029 degrees     QTc Int :  515 ms    Normal sinus rhythm  Rightward axis  Anterior infarct ,age undetermined  Prolonged QT  Abnormal ECG  When compared with ECG of 06-FEB-2023 21:51,  Questionable change in The axis    Referred By: [email or web address]   SELF           Confirmed By:                                   Imaging Results              X-Ray Chest AP Portable (Final result)  Result time 02/07/23 08:25:28      Final result by Kaushik Gutierrez MD (02/07/23 08:25:28)                   Impression:      1. No acute cardiopulmonary findings.  2. Heart is enlarged although less prominent than the comparison.  3. The central line tip likely resides in the right atrium.  Correlate for desired position.  Withdrawal by 5 cm would place tip closer to the cavoatrial junction.      Electronically signed by: Kaushik Gutierrez  Date:    02/07/2023  Time:    08:25               Narrative:    EXAMINATION:  XR CHEST AP PORTABLE    CLINICAL HISTORY:  shortness of breath;    TECHNIQUE:  Single frontal view of the chest was performed.    COMPARISON:  01/25/2023    FINDINGS:  No new airspace disease.  Enlarged cardiac silhouette.  Right-sided central line with tip projecting over the right heart border.  No pleural effusion or pneumothorax.  No acute osseous abnormality.  Cholecystectomy.                        ED Interpretation by Everton Chamberlain MD (02/07/23 04:09:43, [facility], Emergency Medicine)    Vas-Cath noted in good position.  Otherwise no acute cardiopulmonary process noted.                                     Medications   mupirocin 2 % ointment ( Nasal Given 2/7/23 0995)   insulin aspart U-100 pen 1-10 Units (has no administration in time range)   insulin detemir U-100 pen 8 Units (has no administration in time range)   metoclopramide HCl injection 10 mg (has no administration in time range)   prochlorperazine injection Soln 10 mg (has no administration in time range)   droperidoL injection 1.25 mg (1.25 mg Intramuscular Given  2/7/23 0300)   diphenhydrAMINE injection 25 mg (25 mg Intramuscular Given 2/7/23 0257)   ondansetron injection 4 mg (4 mg Intravenous Given 2/7/23 0723)   aspirin EC tablet 325 mg (325 mg Oral Given 2/7/23 0727)   morphine injection 6 mg (6 mg Intravenous Given 2/7/23 0722)   carvediloL tablet 25 mg (25 mg Oral Given 2/7/23 0819)   amLODIPine tablet 10 mg (10 mg Oral Given 2/7/23 0819)   gabapentin capsule 100 mg (100 mg Oral Given 2/7/23 0820)   hydrALAZINE tablet 100 mg (100 mg Oral Given 2/7/23 0817)   furosemide tablet 40 mg (40 mg Oral Given 2/7/23 0821)   isosorbide mononitrate 24 hr tablet 120 mg (120 mg Oral Given 2/7/23 0822)   levETIRAcetam tablet 500 mg (500 mg Oral Given 2/7/23 0822)   losartan-hydrochlorothiazide 50-12.5 mg per tablet 1 tablet (1 tablet Oral Given 2/7/23 0937)   pantoprazole EC tablet 40 mg (40 mg Oral Given 2/7/23 0821)   insulin aspart U-100 pen 8 Units (8 Units Subcutaneous Given 2/7/23 0826)   potassium chloride SA CR tablet 40 mEq (40 mEq Oral Given 2/7/23 0937)     Medical Decision Making:   History:   Old Medical Records: I decided to obtain old medical records.  Clinical Tests:   Radiological Study: Ordered and Reviewed  Patient was seen by Dr. Chamberlain.  He was unable to assign himself as the attending physician during his shift therefore the chart is listed under my name.  I did not perform the history physical review of systems initial workup.  Care was transferred to me at 7:00 a.m..  Patient was admitted to the hospitalist for early DKA and recurrent chronic abdominal pain.        Scribe Attestation:   Scribe #1: I performed the above scribed service and the documentation accurately describes the services I performed. I attest to the accuracy of the note.      ED Course as of 02/07/23 1637   Tue Feb 07, 2023   0520 Patient now complaining of worsening chest pain.  Will repeat troponin.  Patient's initial troponin was at her normal elevated baseline.  That was done at 6:00  p.m. yesterday. [CC]   0520 EKG rate 95, regular rhythm, sinus rhythm,  other intervals within normal limits, no ST elevations or depressions noted, similar to previous. [CC]   0708 Care transferred to me.  Pt has hx of gastroparesis.  Pt given droperidol.  Pt has esrd and was complaining of cp.  Awaiting troponin.  Awaiting morphine for likely GI related pain.  [JS]   0734 This is the discharge summary note from a week and half ago at [facility].      Patient again got admitted with abdominal pain and specifically asked for dilaudid to relieve her pain   This is a chronic issue for her and since October 2022 she already had 12  CT scans of abdomen /Pelvis/ Chest  This time also she had abdomen CT scan on arrival to ER  Dilaudid was not provided and she got discharged after Hemodialysis  Pt was noted to be suffering from abdominal pain only when staff is around   Other times she watch TV and giggles  At the time of dictation of this DC summary pt is in different hospital ER  and already had another CT per renal stone protocol  She should not get admitted with chronic abdominal pain because she clearly has drug seeking behavior [JS]   0735 GI endoscopy 12/22 by Dr. Zhong:    - Normal esophagus.                          - Z-line regular, 42 cm from the incisors.                          - Small hiatal hernia.                          - Gastritis. Biopsied.                          - Normal examined duodenum [JS]   0737 Chest x-ray shows cardiomegaly.  No focal edema or infiltrate. [JS]   0840 Patient remains hypertensive.  I gave her all of her home antihypertensives.  She does have a high anion gap metabolic acidosis but I do not want to start her on insulin infusion yet.  I just gave her 8 units of subcutaneous NovoLog and I would like to see which way she trends.  She is been hypoglycemic with insulin in the past.  ABG and beta hydroxybutyrate are pending.  Will see if her beta  hydroxybutyrate is elevated to confirm DKA.  High anion gap metabolic acidosis could be from uremia and dehydration from vomiting.  Hospital medicine will consult given that the patient has no ride to dialysis, no medications, no family in town, history of noncompliance.  I would not admit this patient for recurrent chronic abdominal pain, it is basically her electrolyte issues. [JS]      ED Course User Index  [CC] Everton Chamberlain MD  [JS] Nura Grant MD        I, Dr. Nura Grant personally performed the services described in this documentation. All medical record entries made by the scribe were at my direction and in my presence.  I have reviewed the chart and agree that the record reflects my personal performance and is accurate and complete. Nura Grant MD.  4:38 PM 02/07/2023    DISCLAIMER: This note was prepared with Dragon NaturallySpeaking voice recognition transcription software. Garbled syntax, mangled pronouns, and other bizarre constructions may be attributed to that software system        Clinical Impression:   Final diagnoses:  [R07.9] Chest pain  [I10] Uncontrolled hypertension (Primary)  [E11.43, K31.84] Diabetic gastroparesis  [Z76.5] Drug-seeking behavior  [E11.10] Diabetic ketoacidosis without coma associated with type 2 diabetes mellitus  [Z91.199] History of noncompliance with medical treatment        ED Disposition Condition    Observation Stable                Nura Grant MD  02/07/23 9765

## 2023-02-07 NOTE — ED NOTES
Called both parents, Father unavailable, left message.  Spoke with step mother and she is unable to pick pt. Up due to not being in town.

## 2023-02-07 NOTE — PROGRESS NOTES
02/07/23 1650   Required for all Hemodialysis Patients   Hepatitis Status negative  (Last Hep result 1/5/23. Collected 2/7/23)   Handoff Report   Given To Dannielle LINTON   Vital Signs   Temp 97.3 °F (36.3 °C)   Temp src Axillary   Pulse 75   Heart Rate Source Monitor   Resp 16   SpO2 95 %   Pulse Oximetry Type Intermittent   Device (Oxygen Therapy) room air   /81   BP Location Left arm   BP Method Automatic   Patient Position Lying   Assessments (Pre/Post)   Consent Obtained yes   Date Hepatitis Profile Obtained 02/07/23   Blood Liters Processed (BLP) 63.3   Transport Modality not applicable  (Bedside)   Level of Consciousness (AVPU) alert   Dialyzer Clearance mildly streaked        Hemodialysis Catheter 12/09/22 right subclavian   Placement Date: 12/09/22   Location: right subclavian   Line Necessity Review CRRT/HD   Site Assessment No drainage;No redness;No swelling;No warmth   Line Securement Device Secured with sutures   Dressing Type Biopatch in place;Central line dressing   Dressing Status Clean;Dry;Intact   Dressing Intervention Sterile dressing change   Date on Dressing 02/07/23   Dressing Due to be Changed 02/13/23   Venous Patency/Care flushed w/o difficulty;normal saline locked  (Capped)   Arterial Patency/Care flushed w/o difficulty;normal saline locked  (capped)   Post-Hemodialysis Assessment   Rinseback Volume (mL) 250 mL   Blood Volume Processed (Liters) 63.3 L   Dialyzer Clearance Lightly streaked   Duration of Treatment 180 minutes   Additional Fluid Intake (mL) 500 mL   Total UF (mL) 2500 mL   Net Fluid Removal 2000   Patient Response to Treatment Tolerated   Post-Treatment Weight   (Unable to obtain/pt on stretcher)   Post-Hemodialysis Comments HD complete/blood reinfused per protocol. Cath Normal Saline locked due to low Platelets.MAP remained >60. No distress noted from HD

## 2023-02-08 VITALS
OXYGEN SATURATION: 98 % | DIASTOLIC BLOOD PRESSURE: 76 MMHG | SYSTOLIC BLOOD PRESSURE: 114 MMHG | HEART RATE: 98 BPM | HEIGHT: 62 IN | WEIGHT: 132 LBS | TEMPERATURE: 97 F | BODY MASS INDEX: 24.29 KG/M2 | RESPIRATION RATE: 16 BRPM

## 2023-02-08 LAB
ALBUMIN SERPL BCP-MCNC: 2.9 G/DL (ref 3.5–5.2)
ALP SERPL-CCNC: 115 U/L (ref 55–135)
ALT SERPL W/O P-5'-P-CCNC: 10 U/L (ref 10–44)
ANION GAP SERPL CALC-SCNC: 8 MMOL/L (ref 8–16)
AST SERPL-CCNC: 18 U/L (ref 10–40)
BASOPHILS # BLD AUTO: 0.03 K/UL (ref 0–0.2)
BASOPHILS NFR BLD: 0.6 % (ref 0–1.9)
BILIRUB SERPL-MCNC: 1.8 MG/DL (ref 0.1–1)
BUN SERPL-MCNC: 21 MG/DL (ref 6–20)
CALCIUM SERPL-MCNC: 8.9 MG/DL (ref 8.7–10.5)
CHLORIDE SERPL-SCNC: 108 MMOL/L (ref 95–110)
CO2 SERPL-SCNC: 25 MMOL/L (ref 23–29)
CREAT SERPL-MCNC: 3.8 MG/DL (ref 0.5–1.4)
DIFFERENTIAL METHOD: ABNORMAL
EOSINOPHIL # BLD AUTO: 0.1 K/UL (ref 0–0.5)
EOSINOPHIL NFR BLD: 2.2 % (ref 0–8)
ERYTHROCYTE [DISTWIDTH] IN BLOOD BY AUTOMATED COUNT: 18.4 % (ref 11.5–14.5)
EST. GFR  (NO RACE VARIABLE): 15 ML/MIN/1.73 M^2
GLUCOSE SERPL-MCNC: 48 MG/DL (ref 70–110)
HBV CORE AB SERPL QL IA: NORMAL
HBV SURFACE AB SER-ACNC: 15.34 MIU/ML
HBV SURFACE AB SER-ACNC: REACTIVE M[IU]/ML
HBV SURFACE AG SERPL QL IA: NORMAL
HCT VFR BLD AUTO: 24.5 % (ref 37–48.5)
HGB BLD-MCNC: 8.3 G/DL (ref 12–16)
IMM GRANULOCYTES # BLD AUTO: 0.02 K/UL (ref 0–0.04)
IMM GRANULOCYTES NFR BLD AUTO: 0.4 % (ref 0–0.5)
LYMPHOCYTES # BLD AUTO: 1.2 K/UL (ref 1–4.8)
LYMPHOCYTES NFR BLD: 21.8 % (ref 18–48)
MAGNESIUM SERPL-MCNC: 2 MG/DL (ref 1.6–2.6)
MCH RBC QN AUTO: 28.5 PG (ref 27–31)
MCHC RBC AUTO-ENTMCNC: 33.9 G/DL (ref 32–36)
MCV RBC AUTO: 84 FL (ref 82–98)
MONOCYTES # BLD AUTO: 0.7 K/UL (ref 0.3–1)
MONOCYTES NFR BLD: 12.1 % (ref 4–15)
NEUTROPHILS # BLD AUTO: 3.4 K/UL (ref 1.8–7.7)
NEUTROPHILS NFR BLD: 62.9 % (ref 38–73)
NRBC BLD-RTO: 0 /100 WBC
PHOSPHATE SERPL-MCNC: 3.8 MG/DL (ref 2.7–4.5)
PLATELET # BLD AUTO: 86 K/UL (ref 150–450)
PMV BLD AUTO: 9.1 FL (ref 9.2–12.9)
POCT GLUCOSE: 159 MG/DL (ref 70–110)
POCT GLUCOSE: 193 MG/DL (ref 70–110)
POTASSIUM SERPL-SCNC: 4.4 MMOL/L (ref 3.5–5.1)
PROT SERPL-MCNC: 6.6 G/DL (ref 6–8.4)
RBC # BLD AUTO: 2.91 M/UL (ref 4–5.4)
SODIUM SERPL-SCNC: 141 MMOL/L (ref 136–145)
TSH SERPL DL<=0.005 MIU/L-ACNC: 1.14 UIU/ML (ref 0.4–4)
WBC # BLD AUTO: 5.45 K/UL (ref 3.9–12.7)

## 2023-02-08 PROCEDURE — 80053 COMPREHEN METABOLIC PANEL: CPT | Performed by: NURSE PRACTITIONER

## 2023-02-08 PROCEDURE — 84100 ASSAY OF PHOSPHORUS: CPT | Performed by: NURSE PRACTITIONER

## 2023-02-08 PROCEDURE — 83735 ASSAY OF MAGNESIUM: CPT | Performed by: NURSE PRACTITIONER

## 2023-02-08 PROCEDURE — 84443 ASSAY THYROID STIM HORMONE: CPT | Performed by: NURSE PRACTITIONER

## 2023-02-08 PROCEDURE — 94761 N-INVAS EAR/PLS OXIMETRY MLT: CPT

## 2023-02-08 PROCEDURE — 96375 TX/PRO/DX INJ NEW DRUG ADDON: CPT

## 2023-02-08 PROCEDURE — 99900035 HC TECH TIME PER 15 MIN (STAT)

## 2023-02-08 PROCEDURE — 25000003 PHARM REV CODE 250: Performed by: NURSE PRACTITIONER

## 2023-02-08 PROCEDURE — 85025 COMPLETE CBC W/AUTO DIFF WBC: CPT | Performed by: NURSE PRACTITIONER

## 2023-02-08 PROCEDURE — G0378 HOSPITAL OBSERVATION PER HR: HCPCS

## 2023-02-08 PROCEDURE — 36415 COLL VENOUS BLD VENIPUNCTURE: CPT | Performed by: NURSE PRACTITIONER

## 2023-02-08 RX ORDER — DICYCLOMINE HYDROCHLORIDE 10 MG/1
10 CAPSULE ORAL 3 TIMES DAILY
Qty: 90 CAPSULE | Refills: 0 | Status: ON HOLD | OUTPATIENT
Start: 2023-02-08 | End: 2023-03-13 | Stop reason: HOSPADM

## 2023-02-08 RX ADMIN — DICYCLOMINE HYDROCHLORIDE 10 MG: 10 CAPSULE ORAL at 02:02

## 2023-02-08 RX ADMIN — DEXTROSE MONOHYDRATE 250 ML: 100 INJECTION, SOLUTION INTRAVENOUS at 07:02

## 2023-02-08 RX ADMIN — GABAPENTIN 100 MG: 100 CAPSULE ORAL at 08:02

## 2023-02-08 RX ADMIN — APIXABAN 5 MG: 2.5 TABLET, FILM COATED ORAL at 08:02

## 2023-02-08 RX ADMIN — LEVETIRACETAM 500 MG: 500 TABLET, FILM COATED ORAL at 08:02

## 2023-02-08 RX ADMIN — ACETAMINOPHEN 650 MG: 325 TABLET ORAL at 05:02

## 2023-02-08 RX ADMIN — PANTOPRAZOLE SODIUM 40 MG: 40 TABLET, DELAYED RELEASE ORAL at 08:02

## 2023-02-08 RX ADMIN — DOCUSATE SODIUM AND SENNOSIDES 1 TABLET: 8.6; 5 TABLET, FILM COATED ORAL at 08:02

## 2023-02-08 RX ADMIN — MUPIROCIN: 20 OINTMENT TOPICAL at 08:02

## 2023-02-08 RX ADMIN — FERROUS SULFATE TAB 325 MG (65 MG ELEMENTAL FE) 1 EACH: 325 (65 FE) TAB at 08:02

## 2023-02-08 RX ADMIN — DICYCLOMINE HYDROCHLORIDE 10 MG: 10 CAPSULE ORAL at 08:02

## 2023-02-08 RX ADMIN — FLUOXETINE HYDROCHLORIDE 20 MG: 20 CAPSULE ORAL at 08:02

## 2023-02-08 RX ADMIN — HYDRALAZINE HYDROCHLORIDE 100 MG: 25 TABLET, FILM COATED ORAL at 02:02

## 2023-02-08 RX ADMIN — HYDRALAZINE HYDROCHLORIDE 100 MG: 25 TABLET, FILM COATED ORAL at 05:02

## 2023-02-08 NOTE — PROGRESS NOTES
Food & Nutrition                                                           Education     Diet Education: Renal diet   Time Spent: 15 min  Learners: Patient         Nutrition Education provided with handouts:  Chronic Kidney Disease state 3-5 Nutrition Therapy.         Comments 33 y.o Female presented to the ED with abdominal pain , back pain, Nausea and Vomiting. Pt has a PMHx of ESRD on HD, DM2, Gastroparesis, GERD, Hyperkalemia, HTN emergency, sickle cell trait, and Tachycardia. Spoke to Pt. Pt states that she is unable to see to cook for herself and her stepmother cooks for her. She states that her step mother is knowledgeable of her diet restrictions because she is also a diabetic. Pt seem to be knowledgeable of diabetic and renal diet nutrition therapy and was receptive to education given. Pt requested milk for cereal and stated her Nausea had subsided and had a great appetite. Observed Breakfast plate. Pt had eaten 100% of meal. Provided diet education handout for pt to give to step mother secondary to pt stating she was unable to see. Pt states UBW about 132lbs. Pt current wt is 132lbs.         Assessment: NFPE 2/8/23 WDL. Does not meet 2 ASPEN criteria for malnutrition at this time r/t wt gain.  All questions and concerns answered. Dietitian's contact information provided.         Follow-Up: yes  Angélica Jerez: Dietetic Intern  Co-signer: Radha Harding RDN, LDN     Please Re-consult as needed           Thanks!

## 2023-02-08 NOTE — PLAN OF CARE
Problem: Adult Inpatient Plan of Care  Goal: Plan of Care Review  Outcome: Ongoing, Progressing  Goal: Patient-Specific Goal (Individualized)  Outcome: Ongoing, Progressing  Goal: Absence of Hospital-Acquired Illness or Injury  Outcome: Ongoing, Progressing  Goal: Optimal Comfort and Wellbeing  Outcome: Ongoing, Progressing  Goal: Readiness for Transition of Care  Outcome: Ongoing, Progressing     Problem: Device-Related Complication Risk (Hemodialysis)  Goal: Safe, Effective Therapy Delivery  Outcome: Ongoing, Progressing     Problem: Hemodynamic Instability (Hemodialysis)  Goal: Effective Tissue Perfusion  Outcome: Ongoing, Progressing     Problem: Infection (Hemodialysis)  Goal: Absence of Infection Signs and Symptoms  Outcome: Ongoing, Progressing     Problem: Diabetes Comorbidity  Goal: Blood Glucose Level Within Targeted Range  Outcome: Ongoing, Progressing     Problem: Infection  Goal: Absence of Infection Signs and Symptoms  Outcome: Ongoing, Progressing     Problem: Pain Acute  Goal: Acceptable Pain Control and Functional Ability  Outcome: Ongoing, Progressing     Problem: Activity and Energy Impairment (Excessive Substance Use)  Goal: Optimized Energy Level (Excessive Substance Use)  Outcome: Ongoing, Progressing     Problem: Behavior Regulation Impairment (Excessive Substance Use)  Goal: Improved Behavioral Control (Excessive Substance Use)  Outcome: Ongoing, Progressing     Problem: Decreased Participation and Engagement (Excessive Substance Use)  Goal: Increased Participation and Engagement (Excessive Substance Use)  Outcome: Ongoing, Progressing     Problem: Physiologic Impairment (Excessive Substance Use)  Goal: Improved Physiologic Symptoms (Excessive Substance Use)  Outcome: Ongoing, Progressing     Problem: Social, Occupational or Functional Impairment (Excessive Substance Use)  Goal: Enhanced Social, Occupational or Functional Skills (Excessive Substance Use)  Outcome: Ongoing, Progressing

## 2023-02-08 NOTE — PLAN OF CARE
The pt is cleared for discharge home from case management.    02/08/23 1000   Final Note   Anticipated Discharge Disposition Home

## 2023-02-08 NOTE — PLAN OF CARE
02/08/23 1009   Final Note   Assessment Type Final Discharge Note   Anticipated Discharge Disposition Home

## 2023-02-08 NOTE — PLAN OF CARE
Email sent to [facility] madrigal requesting a follow up appointment.    02/08/23 0959   Discharge Assessment   Assessment Type Discharge Planning Reassessment

## 2023-02-08 NOTE — NURSING
Discharge paperwork reviewed with patient. Patient verbalized understanding. Attempted to call patients ride unable to pick patient up at this time no ETA.

## 2023-02-08 NOTE — CONSULTS
INPATIENT NEPHROLOGY CONSULT   [facility] NEPHROLOGY    Tabby Howard  02/08/2023    Reason for consultation:    esrd    Chief Complaint:   Chief Complaint   Patient presents with    Shortness of Breath     And pain all over; seen here earlier and no better          History of Present Illness:     Tabby Howard is a 34 y/o F with a past medical history significant for ESRD on HD, DM, HFpEF, GERD, suspected gastroparesis, sickle cell trait, and HTN who presented to the ED this morning for further evaluation of abdominal pain, back pain, and CP.  She was seen in the ED last night with the same complaints and was discharged home; however, per report she stayed in the ED waiting room all night and signed in to be seen again this morning.  Labs were significant for her chronic end-stage renal disease, chronically elevated glucose, chronically elevated troponin, and chronically elevated BNP. Due to recent CT scan at another facility for the same complaints that showed no acute intra-abdominal abnormalities, no additional imaging was performed.  Pt has had multiple ED visits and hospital admissions over the past several months with similar complaints.  Blood pressure in the ED is uncontrolled 228/119; blood glucose 464; AG 20, CO2 21, potassium 3.2.  Due for dialysis today.  Still c/o chest pain.  Initial troponin 0.053 which is about her baseline.  No significant EKG changes.  She is placed in observation under the service of hospital medicine for continued monitoring and treatment.    2/8  tolerated hd.  No acute events.  Sleeping.          Plan of Care:       Assessment:    esrd  --continue dialysis per routine  --Patient seen on hemodialysis for uremic clearance and ultrafiltration yesterday      --fluid restrict  --renal dose medication per routine  --continue outpt medication  --continue binders with meals    Anemia  --erythropoiesis stimulating agent with renal replacement therapy    Hyperphosphatemia  --renal  diet  --continue binders    Hypertension  --uf with hd  --fluid restrict  --low salt diet  --continue home medication    Pt has been admitted 18 times since July.  Perhaps lower threshold to admit every time she comes to the ED should be considered.  She has displayed drug seeking behavior          Thank you for allowing us to participate in this patient's care. We will continue to follow.    Vital Signs:  Temp Readings from Last 3 Encounters:   23 97.4 °F (36.3 °C) (Tympanic)   23 97.9 °F (36.6 °C) (Oral)   23 97.6 °F (36.4 °C) (Oral)       Pulse Readings from Last 3 Encounters:   23 78   23 98   23 90       BP Readings from Last 3 Encounters:   23 129/89   23 (!) 187/110   23 (!) 136/94       Weight:  Wt Readings from Last 3 Encounters:   23 59.9 kg (132 lb)   23 59.9 kg (132 lb)   23 59.9 kg (132 lb)       Past Medical & Surgical History:  Past Medical History:   Diagnosis Date    CKD (chronic kidney disease), stage IV 2022    Diabetes mellitus due to underlying condition with unspecified complications 2022    Gastroparesis 2022    Heart failure with preserved ejection fraction 2022    EF 55% on 3/22    History of gastroesophageal reflux (GERD)     History of supraventricular tachycardia     Hyperkalemia 2022    Hypertensive emergency 2022    Sickle cell trait 2022    Type 2 diabetes mellitus        Past Surgical History:   Procedure Laterality Date     SECTION      x 3    COLONOSCOPY      COLONOSCOPY N/A 2022    Procedure: COLONOSCOPY;  Surgeon: Jagdeep Cedeno MD;  Location: [facility];  Service: Endoscopy;  Laterality: N/A;    ESOPHAGOGASTRODUODENOSCOPY N/A 10/18/2019    Procedure: ESOPHAGOGASTRODUODENOSCOPY (EGD);  Surgeon: Gianluca Mendez MD;  Location: [facility];  Service: Endoscopy;  Laterality: N/A;    ESOPHAGOGASTRODUODENOSCOPY N/A 2022    Procedure: EGD (ESOPHAGOGASTRODUODENOSCOPY);   Surgeon: Micky Paredes III, MD;  Location: [facility] ENDO;  Service: Endoscopy;  Laterality: N/A;    ESOPHAGOGASTRODUODENOSCOPY N/A 12/5/2022    Procedure: EGD (ESOPHAGOGASTRODUODENOSCOPY);  Surgeon: Marcelo Zhong MD;  Location: [facility] ENDO;  Service: Endoscopy;  Laterality: N/A;    LAPAROSCOPIC CHOLECYSTECTOMY N/A 07/30/2022    Procedure: CHOLECYSTECTOMY, LAPAROSCOPIC;  Surgeon: Grey Perez MD;  Location: [facility] OR;  Service: General;  Laterality: N/A;    PLACEMENT OF DUAL-LUMEN VASCULAR CATHETER Left 07/12/2022    Procedure: INSERTION-CATHETER-JOSEPH;  Surgeon: Dionte Gan MD;  Location: [facility] OR;  Service: General;  Laterality: Left;    PLACEMENT OF DUAL-LUMEN VASCULAR CATHETER Right 07/26/2022    Procedure: INSERTION-CATHETER-Hemosplit;  Surgeon: Dionte Gan MD;  Location: [facility] OR;  Service: General;  Laterality: Right;       Past Social History:  Social History     Socioeconomic History    Marital status:    Tobacco Use    Smoking status: Never    Smokeless tobacco: Never   Substance and Sexual Activity    Alcohol use: Not Currently    Drug use: No    Sexual activity: Not Currently     Partners: Male     Birth control/protection: I.U.D.     Social Determinants of Health     Financial Resource Strain: Unknown    Difficulty of Paying Living Expenses: Patient refused   Food Insecurity: Unknown    Worried About Running Out of Food in the Last Year: Patient refused    Ran Out of Food in the Last Year: Patient refused   Transportation Needs: Unmet Transportation Needs    Lack of Transportation (Medical): Yes    Lack of Transportation (Non-Medical): Yes   Physical Activity: Inactive    Days of Exercise per Week: 0 days    Minutes of Exercise per Session: 0 min   Stress: Unknown    Feeling of Stress : Patient refused   Social Connections: Unknown    Frequency of Communication with Friends and Family: More than three times a week    Frequency of Social Gatherings with Friends and Family: More than  three times a week    Attends Yazidi Services: Patient refused    Active Member of Clubs or Organizations: Patient refused    Attends Club or Organization Meetings: Patient refused    Marital Status: Patient refused   Housing Stability: High Risk    Unable to Pay for Housing in the Last Year: Patient refused    Unstable Housing in the Last Year: Yes       Medications:  No current facility-administered medications on file prior to encounter.     Current Outpatient Medications on File Prior to Encounter   Medication Sig Dispense Refill    albuterol (PROVENTIL/VENTOLIN HFA) 90 mcg/actuation inhaler Inhale 2 puffs into the lungs every 6 (six) hours as needed for Wheezing. Rescue 18 g 3    amLODIPine (NORVASC) 10 MG tablet Take 1 tablet (10 mg total) by mouth once daily. 30 tablet 11    apixaban (ELIQUIS) 5 mg Tab Take 5 mg by mouth 2 (two) times daily.      carvediloL (COREG) 25 MG tablet Take 1 tablet (25 mg total) by mouth 2 (two) times daily. 60 tablet 2    cloNIDine 0.2 mg/24 hr td ptwk (CATAPRES) 0.2 mg/24 hr Place 1 patch onto the skin every 7 days. 4 patch 2    ferrous sulfate 325 (65 FE) MG EC tablet Take 325 mg by mouth once daily.      FLUoxetine 20 MG capsule Take 1 capsule (20 mg total) by mouth once daily. 30 capsule 11    furosemide (LASIX) 40 MG tablet Take 40 mg by mouth 2 (two) times a day.      gabapentin (NEURONTIN) 100 MG capsule Take 1 capsule (100 mg total) by mouth 2 (two) times daily. 90 capsule 2    hydrALAZINE (APRESOLINE) 100 MG tablet Take 1 tablet (100 mg total) by mouth every 8 (eight) hours. 90 tablet 2    HYDROcodone-acetaminophen (NORCO)  mg per tablet Take 1 tablet by mouth every 4 (four) hours as needed for Pain.      HYDROmorphone (DILAUDID) 4 MG tablet Take 4 mg by mouth every 12 (twelve) hours as needed for Pain.      insulin (LANTUS SOLOSTAR U-100 INSULIN) glargine 100 units/mL SubQ pen Inject 16 Units into the skin every evening.      insulin aspart U-100 (NOVOLOG) 100  "unit/mL (3 mL) InPn pen Inject 1-10 Units into the skin before meals and at bedtime as needed (Hyperglycemia). Max daily dose 40 units. 3 mL 6    isosorbide mononitrate (IMDUR) 60 MG 24 hr tablet Take 2 tablets (120 mg total) by mouth once daily. 30 tablet 11    levETIRAcetam (KEPPRA) 500 MG Tab Take 1 tablet (500 mg total) by mouth 2 (two) times daily. 60 tablet 11    losartan-hydrochlorothiazide 100-12.5 mg (HYZAAR) 100-12.5 mg Tab Take 1 tablet by mouth once daily.      mirtazapine (REMERON SOL-TAB) 15 MG disintegrating tablet Take 1 tablet (15 mg total) by mouth nightly. 90 tablet 0    ondansetron (ZOFRAN) 4 MG tablet Take 1 tablet (4 mg total) by mouth every 8 (eight) hours as needed for Nausea. 60 tablet 2    pantoprazole (PROTONIX) 40 MG tablet Take 40 mg by mouth once daily.      pen needle, diabetic (BD ULTRA-FINE MAGALIE PEN NEEDLE) 32 gauge x 5/32" Ndle Inject into the skin 5 times per day with insulin 100 each 12    promethazine (PHENERGAN) 25 MG suppository Place 1 suppository (25 mg total) rectally every 6 (six) hours as needed for Nausea. 10 suppository 0    [DISCONTINUED] atenoloL (TENORMIN) 50 MG tablet Take 1 tablet (50 mg total) by mouth every other day. 45 tablet 3    [DISCONTINUED] dicyclomine (BENTYL) 10 MG capsule Take 1 capsule (10 mg total) by mouth 3 (three) times daily. 90 capsule 0    [DISCONTINUED] omeprazole (PRILOSEC) 20 MG capsule Take 2 capsules (40 mg total) by mouth once daily. for 10 days 20 capsule 0    [DISCONTINUED] torsemide (DEMADEX) 20 MG Tab Take 1 tablet (20 mg total) by mouth 2 (two) times a day. (Patient taking differently: Take 20 mg by mouth once daily.) 60 tablet 1     Scheduled Meds:   amLODIPine  10 mg Oral Daily    apixaban  5 mg Oral BID    carvediloL  25 mg Oral BID    cloNIDine 0.2 mg/24 hr td ptwk  1 patch Transdermal Q7 Days    dicyclomine  10 mg Oral TID    ferrous sulfate  1 tablet Oral Daily    FLUoxetine  20 mg Oral Daily    furosemide  40 mg Oral BID    " "gabapentin  100 mg Oral BID    hydrALAZINE  100 mg Oral Q8H    insulin detemir U-100  16 Units Subcutaneous QHS    insulin detemir U-100  8 Units Subcutaneous Once    isosorbide mononitrate  120 mg Oral Daily    levETIRAcetam  500 mg Oral BID    losartan-hydrochlorothiazide 50-12.5 mg  1 tablet Oral Daily    mirtazapine  15 mg Oral QHS    mupirocin   Nasal BID    pantoprazole  40 mg Oral Daily    senna-docusate 8.6-50 mg  1 tablet Oral BID     Continuous Infusions:  PRN Meds:.acetaminophen, albuterol-ipratropium, aluminum-magnesium hydroxide-simethicone, cloNIDine, dextrose 10%, dextrose 10%, glucagon (human recombinant), glucose, glucose, insulin aspart U-100, melatonin, metoclopramide HCl, naloxone, prochlorperazine, simethicone, sodium chloride 0.9%    Allergies:  Penicillins    Past Family History:  Reviewed; refer to Hospitalist Admission Note    Review of Systems:  Review of Systems - All 14 systems reviewed and negative, except as noted in HPI    Physical Exam:    /89 (BP Location: Left arm, Patient Position: Lying)   Pulse 78   Temp 97.4 °F (36.3 °C) (Tympanic)   Resp 16   Ht 5' 2" (1.575 m)   Wt 59.9 kg (132 lb)   SpO2 99%   Breastfeeding No   BMI 24.14 kg/m²     General Appearance:    Sleeping.  Quiet.     Head:    Normocephalic, without obvious abnormality, atraumatic   Eyes:    PER, conjunctiva/corneas clear, EOM's intact in both eyes        Throat:   Lips, mucosa, and tongue normal; teeth and gums normal   Back:     Symmetric, no curvature, ROM normal, no CVA tenderness   Lungs:     Clear to auscultation bilaterally, respirations unlabored   Chest wall:    No tenderness or deformity   Heart:    Regular rate and rhythm, S1 and S2 normal, no murmur, rub   or gallop   Abdomen:     Soft, non-tender, bowel sounds active all four quadrants,     no masses, no organomegaly   Extremities:   Extremities normal, atraumatic, no cyanosis or edema   Pulses:   2+ and symmetric all extremities   MSK:   No " joint or muscle swelling, tenderness or deformity   Skin:   Skin color, texture, turgor normal, no rashes or lesions   Neurologic:   CNII-XII intact, normal strength and sensation       Throughout.  No flap     Results:  Lab Results   Component Value Date     02/08/2023    K 4.4 02/08/2023     02/08/2023    CO2 25 02/08/2023    BUN 21 (H) 02/08/2023    CREATININE 3.8 (H) 02/08/2023    CALCIUM 8.9 02/08/2023    ANIONGAP 8 02/08/2023    ESTGFRAFRICA 20 (A) 07/31/2022    EGFRNONAA 18 (A) 07/31/2022       Lab Results   Component Value Date    CALCIUM 8.9 02/08/2023    PHOS 3.8 02/08/2023       Recent Labs   Lab 02/08/23  0506   WBC 5.45   RBC 2.91*   HGB 8.3*   HCT 24.5*   PLT 86*   MCV 84   MCH 28.5   MCHC 33.9            I have personally reviewed pertinent radiological imaging and reports.    Patient care was time spent personally by me on the following activities:   Obtaining a history  Examination of patient.  Providing medical care at the patients bedside.  Developing a treatment plan with patient or surrogate and bedside caregivers  Ordering and reviewing laboratory studies, radiographic studies, pulse oximetry.  Ordering and performing treatments and interventions.  Evaluation of patient's response to treatment.  Discussions with consultants while on the unit and immediately available to the patient.  Re-evaluation of the patient's condition.  Documentation in the medical record.     Hugh Figueroa MD  Nephrology  [facility]  [phone]

## 2023-02-08 NOTE — NURSING
Patients family member arrive to the hospital to  patient. Patient left via wheelchair to private vehicle. Ivs removed. Tele box returned. Subclavian HD port to remain per MD orders, dressing intact and clean upon discharge.

## 2023-02-08 NOTE — PLAN OF CARE
CM called pts [facility] medicaid at [phone] and the pt does not qualify for transport to [facility] due to having [place] medicaid transportation. Victor Hugo, 2nd floor charge nurse updated that pt does not qualify for home wheelchair van and that he can speak to management about paying for cab fare for transport.     02/08/23 1530   Discharge Assessment   Assessment Type Discharge Planning Reassessment

## 2023-02-08 NOTE — NURSING
On morning lab blood glucose was 48 night shift RN hung PRN D10% 250ml bolus. Recheck blood glucose 193.

## 2023-02-08 NOTE — CARE UPDATE
02/07/23 1909   Patient Assessment/Suction   Level of Consciousness (AVPU) alert   Respiratory Effort Unlabored   PRE-TX-O2   Device (Oxygen Therapy) room air   SpO2 99 %   Pulse Oximetry Type Intermittent   $ Pulse Oximetry - Multiple Charge Pulse Oximetry - Multiple   Pulse 88   Resp 18   Aerosol Therapy   $ Aerosol Therapy Charges PRN treatment not required

## 2023-02-10 ENCOUNTER — TELEPHONE (OUTPATIENT)
Dept: OPHTHALMOLOGY | Facility: CLINIC | Age: 33
End: 2023-02-10

## 2023-02-10 ENCOUNTER — PROCEDURE VISIT (OUTPATIENT)
Dept: OPHTHALMOLOGY | Facility: CLINIC | Age: 33
End: 2023-02-10
Payer: MEDICAID

## 2023-02-10 DIAGNOSIS — H26.9 CORTICAL CATARACT OF BOTH EYES: ICD-10-CM

## 2023-02-10 DIAGNOSIS — H43.13 VITREOUS HEMORRHAGE, BOTH EYES: ICD-10-CM

## 2023-02-10 DIAGNOSIS — E11.3523 BOTH EYES AFFECTED BY PROLIFERATIVE DIABETIC RETINOPATHY WITH TRACTION RETINAL DETACHMENTS INVOLVING MACULAE, ASSOCIATED WITH TYPE 2 DIABETES MELLITUS: Primary | ICD-10-CM

## 2023-02-10 PROCEDURE — 99214 OFFICE O/P EST MOD 30 MIN: CPT | Mod: S$PBB,,, | Performed by: OPHTHALMOLOGY

## 2023-02-10 PROCEDURE — 99214 PR OFFICE/OUTPT VISIT, EST, LEVL IV, 30-39 MIN: ICD-10-PCS | Mod: S$PBB,,, | Performed by: OPHTHALMOLOGY

## 2023-02-10 PROCEDURE — 76512 OPH US DX B-SCAN: CPT | Mod: 50,PBBFAC | Performed by: OPHTHALMOLOGY

## 2023-02-10 PROCEDURE — 76512 B SCAN: ICD-10-PCS | Mod: 26,50,S$PBB, | Performed by: OPHTHALMOLOGY

## 2023-02-10 RX ORDER — TORSEMIDE 100 MG/1
100 TABLET ORAL DAILY
Status: ON HOLD | COMMUNITY
Start: 2022-05-26 | End: 2023-03-13 | Stop reason: HOSPADM

## 2023-02-10 NOTE — DISCHARGE SUMMARY
[facility] Medicine  Discharge Summary      Patient Name: Tabby Howard  MRN: [id number]  UNIQUE: [id number]  Patient Class: OP- Observation  Admission Date: 2/7/2023  Hospital Length of Stay: 0 days  Discharge Date and Time: 2/8/2023  4:27 PM  Attending Physician: Emani att. providers found   Discharging Provider: JACQUELINE Sheets  Primary Care Provider: [facility]    Primary Care Team: Networked reference to record PCT     HPI:   Tabby Howard is a 34 y/o F with a past medical history significant for ESRD on HD, DM, HFpEF, GERD, suspected gastroparesis, sickle cell trait, and HTN who presented to the ED this morning for further evaluation of abdominal pain, back pain, and CP.  She was seen in the ED last night with the same complaints and was discharged home; however, per report she stayed in the ED waiting room all night and signed in to be seen again this morning.  Labs were significant for her chronic end-stage renal disease, chronically elevated glucose, chronically elevated troponin, and chronically elevated BNP. Due to recent CT scan at another facility for the same complaints that showed no acute intra-abdominal abnormalities, no additional imaging was performed.  Pt has had multiple ED visits and hospital admissions over the past several months with similar complaints.  Blood pressure in the ED is uncontrolled 228/119; blood glucose 464; AG 20, CO2 21, potassium 3.2.  Due for dialysis today.  Still c/o chest pain.  Initial troponin 0.053 which is about her baseline.  No significant EKG changes.  She is placed in observation under the service of hospital medicine for continued monitoring and treatment.      * No surgery found *      Hospital Course:   Tabby Howard was monitored closely during her hospitalization. She was admitted with uncontrolled hypertension and uncontrolled type 2 DM with hyperglycemia, likely secondary to  noncompliance.  Pt had been seen in the ED the previous day for abdominal pain, discharged home and remained in the ED waiting room all night without her medications.  Nephrology was consulted for dialysis due to her ESRD.  Hyperglycemia was treated with a small IVF bolus and insulin.  She was given her antihypertensives and underwent dialysis.  Her blood pressure and blood glucose improved and she was cleared for discharge.  Discharge instructions were reviewed to include return precautions.  Appropriate follow-up was arranged.  She was discharged home in stable condition.       Goals of Care Treatment Preferences:  Code Status: Full Code    Health care agent: Step-Mother Tanya Howard / Father Garcia Howard  Health care agent number: [phone] / [phone]          What is most important right now is to focus on remaining as independent as possible.  Accordingly, we have decided that the best plan to meet the patient's goals includes continuing with treatment.      Consults:   Consults (From admission, onward)        Status Ordering Provider     Case Management/  Once        Provider:  (Not yet assigned)    Completed MARTINE MEDRANO     Inpatient consult to Nephrology  Once        Provider:  Prateek Clark MD    Completed MARTINE MEDRANO          No new Assessment & Plan notes have been filed under this hospital service since the last note was generated.  Service: Hospital Medicine    Final Active Diagnoses:    Diagnosis Date Noted POA    PRINCIPAL PROBLEM:  Uncontrolled hypertension [I10] 07/30/2022 Yes    Demand ischemia [I24.8] 02/07/2023 Yes    DM (diabetes mellitus) [E11.9] 01/27/2023 Yes    Drug-seeking behavior [Z76.5] 01/24/2023 Yes    Anemia of chronic kidney failure, stage 5 [N18.5, D63.1] 10/31/2022 Yes     Chronic    Thrombocytopenia [D69.6] 10/26/2022 Yes     Chronic    Deep vein thrombosis (DVT) of non-extremity vein [I82.90] 07/26/2022 Yes     Chronic     "ESRD (end stage renal disease) on dialysis [N18.6, Z99.2] 07/22/2022 Not Applicable     Chronic    Seizure disorder [G40.909] 05/29/2022 Yes     Chronic    Gastroparesis - suspected, unconfirmed [K31.84] 04/12/2022 Yes     Chronic    Sickle cell trait [D57.3] 04/12/2022 Yes     Chronic      Problems Resolved During this Admission:       Discharged Condition: stable    Disposition: Home or Self Care    Follow Up:   Follow-up Information     [facility] Follow up on 2/15/2023.    Why: 11:00 AM  Contact information:  [place] [id number]  [phone]                       Patient Instructions:      Diet renal     Diet diabetic     Activity as tolerated       Significant Diagnostic Studies: Labs: All labs within the past 24 hours have been reviewed    Pending Diagnostic Studies:     None         Medications:  Reconciled Home Medications:      Medication List      CONTINUE taking these medications    albuterol 90 mcg/actuation inhaler  Commonly known as: PROVENTIL/VENTOLIN HFA  Inhale 2 puffs into the lungs every 6 (six) hours as needed for Wheezing. Rescue     amLODIPine 10 MG tablet  Commonly known as: NORVASC  Take 1 tablet (10 mg total) by mouth once daily.     BD ULTRA-FINE MAGALIE PEN NEEDLE 32 gauge x 5/32" Ndle  Generic drug: pen needle, diabetic  Inject into the skin 5 times per day with insulin     carvediloL 25 MG tablet  Commonly known as: COREG  Take 1 tablet (25 mg total) by mouth 2 (two) times daily.     cloNIDine 0.2 mg/24 hr td ptwk 0.2 mg/24 hr  Commonly known as: CATAPRES  Place 1 patch onto the skin every 7 days.     dicyclomine 10 MG capsule  Commonly known as: BENTYL  Take 1 capsule (10 mg total) by mouth 3 (three) times daily.     ELIQUIS 5 mg Tab  Generic drug: apixaban  Take 5 mg by mouth 2 (two) times daily.     ferrous sulfate 325 (65 FE) MG EC tablet  Take 325 mg by mouth once daily.     FLUoxetine 20 MG capsule  Take 1 capsule (20 mg total) " by mouth once daily.     furosemide 40 MG tablet  Commonly known as: LASIX  Take 40 mg by mouth 2 (two) times a day.     gabapentin 100 MG capsule  Commonly known as: NEURONTIN  Take 1 capsule (100 mg total) by mouth 2 (two) times daily.     hydrALAZINE 100 MG tablet  Commonly known as: APRESOLINE  Take 1 tablet (100 mg total) by mouth every 8 (eight) hours.     HYDROcodone-acetaminophen  mg per tablet  Commonly known as: NORCO  Take 1 tablet by mouth every 4 (four) hours as needed for Pain.     HYDROmorphone 4 MG tablet  Commonly known as: DILAUDID  Take 4 mg by mouth every 12 (twelve) hours as needed for Pain.     isosorbide mononitrate 60 MG 24 hr tablet  Commonly known as: IMDUR  Take 2 tablets (120 mg total) by mouth once daily.     LANTUS SOLOSTAR U-100 INSULIN glargine 100 units/mL SubQ pen  Generic drug: insulin  Inject 16 Units into the skin every evening.     levETIRAcetam 500 MG Tab  Commonly known as: KEPPRA  Take 1 tablet (500 mg total) by mouth 2 (two) times daily.     losartan-hydrochlorothiazide 100-12.5 mg 100-12.5 mg Tab  Commonly known as: HYZAAR  Take 1 tablet by mouth once daily.     mirtazapine 15 MG disintegrating tablet  Commonly known as: REMERON SOL-TAB  Take 1 tablet (15 mg total) by mouth nightly.     NovoLOG FlexPen U-100 Insulin 100 unit/mL (3 mL) Inpn pen  Generic drug: insulin aspart U-100  Inject 1-10 Units into the skin before meals and at bedtime as needed (Hyperglycemia). Max daily dose 40 units.     ondansetron 4 MG tablet  Commonly known as: ZOFRAN  Take 1 tablet (4 mg total) by mouth every 8 (eight) hours as needed for Nausea.     pantoprazole 40 MG tablet  Commonly known as: PROTONIX  Take 40 mg by mouth once daily.     promethazine 25 MG suppository  Commonly known as: PHENERGAN  Place 1 suppository (25 mg total) rectally every 6 (six) hours as needed for Nausea.            Indwelling Lines/Drains at time of discharge:   Lines/Drains/Airways     Central Venous Catheter  Line  Duration                Hemodialysis Catheter 12/09/22 right subclavian 63 days                Time spent on the discharge of patient: 32 minutes         JACQUELINE Sheets  Department of Hospital Medicine  [facility]

## 2023-02-10 NOTE — PROGRESS NOTES
HPI     1 mo DFE/OCTm    DLS 11/04/2022 by Dr. JES Garay MD    Cc: pt reports vision is blurry and dark. In my left eye I think I am   seeing blood in this eye.      now at 10:38a    ++blurred Va  --flashes/floaters OU   --headaches (rating 8-9)   --diplopia  --veil and curtain OD    Eye Meds: NONE    POHx  1. Type 2 diabetes mellitus with chronic kidney disease on chronic   dialysis, with long-term current use of insulin     S/P  Avastin OU ( 11/04/2022 )     2. Vitreous Hem OU  3. Cortical Cataract OU  Last edited by Roro Moore MA on 2/10/2023 10:43 AM.         A/P    ICD-10-CM ICD-9-CM   1. Stable proliferative diabetic retinopathy of both eyes associated with type 2 diabetes mellitus  E11.3553 250.50     362.02   2. Vitreous hemorrhage, both eyes  H43.13 379.23   3. Cortical cataract of both eyes  H26.9 366.9       1. Stable proliferative diabetic retinopathy of both eyes associated with type 2 diabetes mellitus  2. Vitreous hemorrhage, both eyes  F/u for VH, DM check  On Dialysis, sickle trait    Was lost to f/u since Nov 2022, has been admitted to hospital multiple times     OD: s/p BLANCA 11/4/22  VA LP (was HM) with dense VH, appears detached on B scan     OS: s/p BLANCA 11/4/22  VA LP (was 20/150) with dense VH, retina attached B scan with few membranes unclear if detached   Plan:   Discussed at length with patient and mother about guarded visual prognosis, given that left eye has progressed over past few months to LP with dense heme, perhaps better visual potential and would recommend vitrectomy with possible need for gas/oil, guarded prognosis for OD.     Pt and mother wish to proceed, will plan for tentative Feb 27th under general anesthesia for left eye, will need medical clearance likely given numerous medical comorbidities    Risks, benefits and alternatives to surgery and anesthesia including no treatment were discussed with the patient including: death, coma, blindness, bleeding, retinal  detachment, cataract, need for more surgeries and glaucoma. The patient understands and accepts risks All questions were answered and the patient wishes to proceed with surgery    Recommend Pars Plana Vitrectomy / Endolaser /   Membrane Peel / Gas or Silicone Oil injection Left Eye   R/B/A to surgery and anesthesia discussed with patient including: death, coma, blindness, bleeding, retinal detachment, cataract, and glaucoma Patient understands and accepts risks All questions answered Patient wishes to proceed with surgery       3. Cortical cataract of both eyes  Mod CC  Plan: Observation for now      RTC post-op      I saw and examined the patient and reviewed in detail the findings documented. The final examination findings, image interpretations, and plan as documented in the record represent my personal judgment and conclusions.    Alfonso Garay MD  Vitreoretinal Surgery   [facility]

## 2023-02-10 NOTE — HOSPITAL COURSE
Tabby Howard was monitored closely during her hospitalization. She was admitted with uncontrolled hypertension and uncontrolled type 2 DM with hyperglycemia, likely secondary to noncompliance.  Pt had been seen in the ED the previous day for abdominal pain, discharged home and remained in the ED waiting room all night without her medications.  Nephrology was consulted for dialysis due to her ESRD.  Hyperglycemia was treated with a small IVF bolus and insulin.  She was given her antihypertensives and underwent dialysis.  Her blood pressure and blood glucose improved and she was cleared for discharge.  Discharge instructions were reviewed to include return precautions.  Appropriate follow-up was arranged.  She was discharged home in stable condition.

## 2023-02-16 ENCOUNTER — TELEPHONE (OUTPATIENT)
Dept: TRANSPLANT | Facility: CLINIC | Age: 33
End: 2023-02-16
Payer: MEDICAID

## 2023-02-16 NOTE — LETTER
February 16, 2023        Dear  :    Patient: Tabby Howard  MR Number [id number]  Date of Birth 1990    Thank you for your referral of Tabby Howard to our transplant program.      Your patient's information will be screened by our Referral Nurse Coordinators to determine initial medical candidacy and if any further records are required. We will contact you if further information is needed to process the referral.     Once all needed information is received it will be forwarded to our Transplant Financial Coordinators who will obtain insurance authorization. Upon insurance approval, your patient will be contacted by our  to schedule their appointments with the transplant team. When appointments are made you will receive a copy of the appointment letter that your patient will receive.     This process may take two to six weeks to complete.     In the event your patient is not a candidate a copy of our transplant programs opinion including reasons will be returned to you to comply with your yearly review regulations. A copy of that letter will also be sent to the patient.     The following information is needed to process a referral:  Medicare form 2728 for all patients on dialysis.  Dental clearance is required if your patient has primary Medicaid.  Current immunization record.  This information can be faxed to [phone].  Current Dietician evaluation can be faxed to [phone].    Thank you again for your trust in our program and the care of your patient.  If there is anything we can do for you or your staff, please feel free to contact us at [phone].    Sincerely,   [facility]  [facility] Kidney/Pancreas Transplant Team  [facility] [place] [address]  [phone]

## 2023-02-16 NOTE — TELEPHONE ENCOUNTER
Contacted patient to review initial intake information. Patient reports the followin. Can you walk up a flight of stairs without getting short of breath or stopping? No   2. Can you walk one block without getting short of breath or having to stop? No   3. Do you use oxygen? No   4. Do you use a cane, walker, or wheel chair to assist in mobility?   5. Have you been hospitalized or had recent surgery in the last 6 months? No    A. Stroke   B. Heart surgery or heart catheterization   C. Broken bone  6. Do you have any cuts, open sores (ulcers), or wounds anywhere on your body? no   7. Do you go to dialysis? Yes What days? T,T,S  8. Preferred appointment day?   9. Caregiver? Mother (Tanya)    Patient is aware the next steps will include completing records and compliance verification. Patient is aware once provider review and insurance authorization is received we will contact patient to schedule initial visit. Patient is aware that initial visit will begin prior to / at 7 am and will conclude at approximately 3 pm on date of appointment. All questions answered at this time.

## 2023-02-22 ENCOUNTER — TELEPHONE (OUTPATIENT)
Dept: TRANSPLANT | Facility: CLINIC | Age: 33
End: 2023-02-22
Payer: MEDICAID

## 2023-02-24 ENCOUNTER — TELEPHONE (OUTPATIENT)
Dept: OPHTHALMOLOGY | Facility: CLINIC | Age: 33
End: 2023-02-24
Payer: MEDICAID

## 2023-02-24 ENCOUNTER — EPISODE CHANGES (OUTPATIENT)
Dept: TRANSPLANT | Facility: CLINIC | Age: 33
End: 2023-02-24

## 2023-02-26 RX ORDER — TETRACAINE HYDROCHLORIDE 5 MG/ML
1 SOLUTION OPHTHALMIC
Status: CANCELLED | OUTPATIENT
Start: 2023-02-26

## 2023-02-26 RX ORDER — MOXIFLOXACIN 5 MG/ML
1 SOLUTION/ DROPS OPHTHALMIC
Status: CANCELLED | OUTPATIENT
Start: 2023-02-26

## 2023-02-26 RX ORDER — CYCLOPENTOLATE HYDROCHLORIDE 10 MG/ML
1 SOLUTION/ DROPS OPHTHALMIC
Status: CANCELLED | OUTPATIENT
Start: 2023-02-26

## 2023-02-26 RX ORDER — PHENYLEPHRINE HYDROCHLORIDE 25 MG/ML
1 SOLUTION/ DROPS OPHTHALMIC
Status: CANCELLED | OUTPATIENT
Start: 2023-02-26

## 2023-02-26 RX ORDER — PREDNISOLONE ACETATE 10 MG/ML
1 SUSPENSION/ DROPS OPHTHALMIC
Status: CANCELLED | OUTPATIENT
Start: 2023-02-26

## 2023-02-28 DIAGNOSIS — Z91.89 CARDIOVASCULAR EVENT RISK: ICD-10-CM

## 2023-02-28 DIAGNOSIS — Z01.810 HIGH RISK SURGERY, PRE-OPERATIVE CARDIOVASCULAR EXAMINATION: ICD-10-CM

## 2023-02-28 DIAGNOSIS — Z01.818 OTHER SPECIFIED PRE-OPERATIVE EXAMINATION: Primary | ICD-10-CM

## 2023-02-28 DIAGNOSIS — Z76.82 ORGAN TRANSPLANT CANDIDATE: Primary | ICD-10-CM

## 2023-03-02 ENCOUNTER — HOSPITAL ENCOUNTER (INPATIENT)
Facility: HOSPITAL | Age: 33
LOS: 4 days | Discharge: HOME | DRG: 388 | End: 2023-03-06
Attending: EMERGENCY MEDICINE | Admitting: INTERNAL MEDICINE
Payer: MEDICAID

## 2023-03-02 ENCOUNTER — OUTSIDE PLACE OF SERVICE (OUTPATIENT)
Dept: PULMONOLOGY | Facility: CLINIC | Age: 33
End: 2023-03-02
Payer: MEDICAID

## 2023-03-02 DIAGNOSIS — A41.9 SEVERE SEPSIS: Primary | ICD-10-CM

## 2023-03-02 DIAGNOSIS — J18.9 PNEUMONIA OF BOTH LOWER LOBES DUE TO INFECTIOUS ORGANISM: Primary | ICD-10-CM

## 2023-03-02 DIAGNOSIS — A41.9 SEVERE SEPSIS: ICD-10-CM

## 2023-03-02 DIAGNOSIS — R65.20 SEVERE SEPSIS: ICD-10-CM

## 2023-03-02 DIAGNOSIS — G93.41 METABOLIC ENCEPHALOPATHY: ICD-10-CM

## 2023-03-02 DIAGNOSIS — R11.10 VOMITING: ICD-10-CM

## 2023-03-02 DIAGNOSIS — R65.20 SEVERE SEPSIS: Primary | ICD-10-CM

## 2023-03-02 DIAGNOSIS — Z99.2 ESRD (END STAGE RENAL DISEASE) ON DIALYSIS: Chronic | ICD-10-CM

## 2023-03-02 DIAGNOSIS — N18.6 ESRD (END STAGE RENAL DISEASE) ON DIALYSIS: Chronic | ICD-10-CM

## 2023-03-02 DIAGNOSIS — E16.2 HYPOGLYCEMIA: ICD-10-CM

## 2023-03-02 DIAGNOSIS — A41.9 SEPSIS: ICD-10-CM

## 2023-03-02 DIAGNOSIS — I16.1 HYPERTENSIVE EMERGENCY: ICD-10-CM

## 2023-03-02 DIAGNOSIS — Z01.818 OTHER SPECIFIED PRE-OPERATIVE EXAMINATION: Primary | ICD-10-CM

## 2023-03-02 PROBLEM — E11.649 SEVERE DIABETIC HYPOGLYCEMIA: Status: ACTIVE | Noted: 2023-03-02

## 2023-03-02 PROBLEM — K56.1 INTUSSUSCEPTION INTESTINE: Status: ACTIVE | Noted: 2023-03-02

## 2023-03-02 PROBLEM — I50.30 CONGESTIVE HEART FAILURE WITH LEFT VENTRICULAR DIASTOLIC DYSFUNCTION: Chronic | Status: ACTIVE | Noted: 2023-03-02

## 2023-03-02 PROBLEM — I47.10 SVT (SUPRAVENTRICULAR TACHYCARDIA): Chronic | Status: ACTIVE | Noted: 2018-09-15

## 2023-03-02 LAB
ALBUMIN SERPL BCP-MCNC: 3.3 G/DL (ref 3.5–5.2)
ALLENS TEST: ABNORMAL
ALLENS TEST: ABNORMAL
ALP SERPL-CCNC: 415 U/L (ref 55–135)
ALT SERPL W/O P-5'-P-CCNC: 207 U/L (ref 10–44)
ANION GAP SERPL CALC-SCNC: 16 MMOL/L (ref 8–16)
AST SERPL-CCNC: 92 U/L (ref 10–40)
B-OH-BUTYR BLD STRIP-SCNC: 0 MMOL/L (ref 0–0.5)
BASOPHILS # BLD AUTO: 0.02 K/UL (ref 0–0.2)
BASOPHILS NFR BLD: 0.3 % (ref 0–1.9)
BILIRUB SERPL-MCNC: 3.5 MG/DL (ref 0.1–1)
BNP SERPL-MCNC: >4500 PG/ML (ref 0–99)
BUN SERPL-MCNC: 35 MG/DL (ref 6–20)
CALCIUM SERPL-MCNC: 8.6 MG/DL (ref 8.7–10.5)
CHLORIDE SERPL-SCNC: 98 MMOL/L (ref 95–110)
CO2 SERPL-SCNC: 26 MMOL/L (ref 23–29)
CREAT SERPL-MCNC: 5 MG/DL (ref 0.5–1.4)
DELSYS: ABNORMAL
DELSYS: ABNORMAL
DIFFERENTIAL METHOD: ABNORMAL
EOSINOPHIL # BLD AUTO: 0 K/UL (ref 0–0.5)
EOSINOPHIL NFR BLD: 0.3 % (ref 0–8)
ERYTHROCYTE [DISTWIDTH] IN BLOOD BY AUTOMATED COUNT: 18.9 % (ref 11.5–14.5)
EST. GFR  (NO RACE VARIABLE): 11 ML/MIN/1.73 M^2
FLOW: 5
FLOW: 5
GLUCOSE SERPL-MCNC: 101 MG/DL (ref 70–110)
GLUCOSE SERPL-MCNC: 104 MG/DL (ref 70–110)
GLUCOSE SERPL-MCNC: 116 MG/DL (ref 70–110)
GLUCOSE SERPL-MCNC: 125 MG/DL (ref 70–110)
GLUCOSE SERPL-MCNC: 135 MG/DL (ref 70–110)
GLUCOSE SERPL-MCNC: 141 MG/DL (ref 70–110)
GLUCOSE SERPL-MCNC: 144 MG/DL (ref 70–110)
GLUCOSE SERPL-MCNC: 156 MG/DL (ref 70–110)
GLUCOSE SERPL-MCNC: 161 MG/DL (ref 70–110)
GLUCOSE SERPL-MCNC: 192 MG/DL (ref 70–110)
GLUCOSE SERPL-MCNC: 20 MG/DL (ref 70–110)
GLUCOSE SERPL-MCNC: 28 MG/DL (ref 70–110)
GLUCOSE SERPL-MCNC: 283 MG/DL (ref 70–110)
GLUCOSE SERPL-MCNC: 29 MG/DL (ref 70–110)
GLUCOSE SERPL-MCNC: 34 MG/DL (ref 70–110)
GLUCOSE SERPL-MCNC: 86 MG/DL (ref 70–110)
GLUCOSE SERPL-MCNC: 97 MG/DL (ref 70–110)
GLUCOSE SERPL-MCNC: <20 MG/DL (ref 70–110)
HCG INTACT+B SERPL-ACNC: 1.3 MIU/ML
HCO3 UR-SCNC: 30 MMOL/L (ref 24–28)
HCO3 UR-SCNC: 32.2 MMOL/L (ref 24–28)
HCT VFR BLD AUTO: 23.6 % (ref 37–48.5)
HCT VFR BLD CALC: 25 %PCV (ref 36–54)
HGB BLD-MCNC: 7.7 G/DL (ref 12–16)
IMM GRANULOCYTES # BLD AUTO: 0.06 K/UL (ref 0–0.04)
IMM GRANULOCYTES NFR BLD AUTO: 0.8 % (ref 0–0.5)
INFLUENZA A, MOLECULAR: NEGATIVE
INFLUENZA B, MOLECULAR: NEGATIVE
LACTATE SERPL-SCNC: 1.4 MMOL/L (ref 0.5–1.9)
LIPASE SERPL-CCNC: 35 U/L (ref 4–60)
LYMPHOCYTES # BLD AUTO: 0.4 K/UL (ref 1–4.8)
LYMPHOCYTES NFR BLD: 5.5 % (ref 18–48)
MAGNESIUM SERPL-MCNC: 2 MG/DL (ref 1.6–2.6)
MCH RBC QN AUTO: 29.5 PG (ref 27–31)
MCHC RBC AUTO-ENTMCNC: 32.6 G/DL (ref 32–36)
MCV RBC AUTO: 90 FL (ref 82–98)
MODE: ABNORMAL
MODE: ABNORMAL
MONOCYTES # BLD AUTO: 0.3 K/UL (ref 0.3–1)
MONOCYTES NFR BLD: 4.3 % (ref 4–15)
NEUTROPHILS # BLD AUTO: 7.1 K/UL (ref 1.8–7.7)
NEUTROPHILS NFR BLD: 88.8 % (ref 38–73)
NRBC BLD-RTO: 1 /100 WBC
PCO2 BLDA: 50.3 MMHG (ref 35–45)
PCO2 BLDA: 54.8 MMHG (ref 35–45)
PH SMN: 7.38 [PH] (ref 7.35–7.45)
PH SMN: 7.38 [PH] (ref 7.35–7.45)
PLATELET # BLD AUTO: 131 K/UL (ref 150–450)
PMV BLD AUTO: 9.7 FL (ref 9.2–12.9)
PO2 BLDA: 104 MMHG (ref 80–100)
PO2 BLDA: 113 MMHG (ref 80–100)
POC BE: 5 MMOL/L
POC BE: 7 MMOL/L
POC IONIZED CALCIUM: 1.05 MMOL/L (ref 1.06–1.42)
POC SATURATED O2: 98 % (ref 95–100)
POC SATURATED O2: 98 % (ref 95–100)
POC TCO2: 32 MMOL/L (ref 23–27)
POC TCO2: 34 MMOL/L (ref 23–27)
POTASSIUM BLD-SCNC: 4 MMOL/L (ref 3.5–5.1)
POTASSIUM SERPL-SCNC: 3.7 MMOL/L (ref 3.5–5.1)
PROCALCITONIN SERPL IA-MCNC: 1.65 NG/ML (ref 0–0.5)
PROT SERPL-MCNC: 7.7 G/DL (ref 6–8.4)
RBC # BLD AUTO: 2.61 M/UL (ref 4–5.4)
SAMPLE: ABNORMAL
SAMPLE: ABNORMAL
SARS-COV-2 RDRP RESP QL NAA+PROBE: NEGATIVE
SITE: ABNORMAL
SITE: ABNORMAL
SODIUM BLD-SCNC: 137 MMOL/L (ref 136–145)
SODIUM SERPL-SCNC: 140 MMOL/L (ref 136–145)
SP02: 100
SPECIMEN SOURCE: NORMAL
TROPONIN I SERPL HS-MCNC: 28.3 PG/ML (ref 0–14.9)
WBC # BLD AUTO: 8 K/UL (ref 3.9–12.7)

## 2023-03-02 PROCEDURE — 99291 CRITICAL CARE FIRST HOUR: CPT

## 2023-03-02 PROCEDURE — 85025 COMPLETE CBC W/AUTO DIFF WBC: CPT | Performed by: EMERGENCY MEDICINE

## 2023-03-02 PROCEDURE — 99900035 HC TECH TIME PER 15 MIN (STAT)

## 2023-03-02 PROCEDURE — 82010 KETONE BODYS QUAN: CPT | Performed by: EMERGENCY MEDICINE

## 2023-03-02 PROCEDURE — 96376 TX/PRO/DX INJ SAME DRUG ADON: CPT

## 2023-03-02 PROCEDURE — 83735 ASSAY OF MAGNESIUM: CPT | Performed by: EMERGENCY MEDICINE

## 2023-03-02 PROCEDURE — 99499 UNLISTED E&M SERVICE: CPT | Mod: ,,, | Performed by: INTERNAL MEDICINE

## 2023-03-02 PROCEDURE — 84484 ASSAY OF TROPONIN QUANT: CPT | Performed by: EMERGENCY MEDICINE

## 2023-03-02 PROCEDURE — 96375 TX/PRO/DX INJ NEW DRUG ADDON: CPT

## 2023-03-02 PROCEDURE — 36415 COLL VENOUS BLD VENIPUNCTURE: CPT | Performed by: EMERGENCY MEDICINE

## 2023-03-02 PROCEDURE — 84702 CHORIONIC GONADOTROPIN TEST: CPT | Performed by: EMERGENCY MEDICINE

## 2023-03-02 PROCEDURE — 63600175 PHARM REV CODE 636 W HCPCS: Performed by: INTERNAL MEDICINE

## 2023-03-02 PROCEDURE — 83690 ASSAY OF LIPASE: CPT | Performed by: EMERGENCY MEDICINE

## 2023-03-02 PROCEDURE — 83880 ASSAY OF NATRIURETIC PEPTIDE: CPT | Performed by: EMERGENCY MEDICINE

## 2023-03-02 PROCEDURE — 25500020 PHARM REV CODE 255: Performed by: EMERGENCY MEDICINE

## 2023-03-02 PROCEDURE — 87502 INFLUENZA DNA AMP PROBE: CPT | Performed by: EMERGENCY MEDICINE

## 2023-03-02 PROCEDURE — 99222 PR INITIAL HOSPITAL CARE,LEVL II: ICD-10-PCS | Mod: ,,, | Performed by: SURGERY

## 2023-03-02 PROCEDURE — 82962 GLUCOSE BLOOD TEST: CPT

## 2023-03-02 PROCEDURE — 93005 ELECTROCARDIOGRAM TRACING: CPT | Performed by: INTERNAL MEDICINE

## 2023-03-02 PROCEDURE — 99223 1ST HOSP IP/OBS HIGH 75: CPT | Mod: ,,, | Performed by: INTERNAL MEDICINE

## 2023-03-02 PROCEDURE — 82330 ASSAY OF CALCIUM: CPT

## 2023-03-02 PROCEDURE — 93010 EKG 12-LEAD: ICD-10-PCS | Mod: ,,, | Performed by: INTERNAL MEDICINE

## 2023-03-02 PROCEDURE — 25000003 PHARM REV CODE 250: Performed by: EMERGENCY MEDICINE

## 2023-03-02 PROCEDURE — 82803 BLOOD GASES ANY COMBINATION: CPT

## 2023-03-02 PROCEDURE — 99222 1ST HOSP IP/OBS MODERATE 55: CPT | Mod: ,,, | Performed by: SURGERY

## 2023-03-02 PROCEDURE — 36600 WITHDRAWAL OF ARTERIAL BLOOD: CPT

## 2023-03-02 PROCEDURE — 63600175 PHARM REV CODE 636 W HCPCS: Performed by: EMERGENCY MEDICINE

## 2023-03-02 PROCEDURE — 25000003 PHARM REV CODE 250: Performed by: NURSE PRACTITIONER

## 2023-03-02 PROCEDURE — U0002 COVID-19 LAB TEST NON-CDC: HCPCS | Performed by: EMERGENCY MEDICINE

## 2023-03-02 PROCEDURE — 87040 BLOOD CULTURE FOR BACTERIA: CPT | Mod: 59 | Performed by: EMERGENCY MEDICINE

## 2023-03-02 PROCEDURE — 85014 HEMATOCRIT: CPT

## 2023-03-02 PROCEDURE — 20000000 HC ICU ROOM

## 2023-03-02 PROCEDURE — 84132 ASSAY OF SERUM POTASSIUM: CPT

## 2023-03-02 PROCEDURE — 82947 ASSAY GLUCOSE BLOOD QUANT: CPT | Performed by: EMERGENCY MEDICINE

## 2023-03-02 PROCEDURE — 99499 NO LOS: ICD-10-PCS | Mod: ,,, | Performed by: INTERNAL MEDICINE

## 2023-03-02 PROCEDURE — 83605 ASSAY OF LACTIC ACID: CPT | Performed by: NURSE PRACTITIONER

## 2023-03-02 PROCEDURE — 84295 ASSAY OF SERUM SODIUM: CPT

## 2023-03-02 PROCEDURE — 27000221 HC OXYGEN, UP TO 24 HOURS

## 2023-03-02 PROCEDURE — 93010 ELECTROCARDIOGRAM REPORT: CPT | Mod: ,,, | Performed by: INTERNAL MEDICINE

## 2023-03-02 PROCEDURE — 99900031 HC PATIENT EDUCATION (STAT)

## 2023-03-02 PROCEDURE — 84145 PROCALCITONIN (PCT): CPT | Performed by: EMERGENCY MEDICINE

## 2023-03-02 PROCEDURE — 99223 PR INITIAL HOSPITAL CARE,LEVL III: ICD-10-PCS | Mod: ,,, | Performed by: INTERNAL MEDICINE

## 2023-03-02 PROCEDURE — 94799 UNLISTED PULMONARY SVC/PX: CPT

## 2023-03-02 PROCEDURE — 94761 N-INVAS EAR/PLS OXIMETRY MLT: CPT

## 2023-03-02 PROCEDURE — 96365 THER/PROPH/DIAG IV INF INIT: CPT

## 2023-03-02 PROCEDURE — 80053 COMPREHEN METABOLIC PANEL: CPT | Performed by: EMERGENCY MEDICINE

## 2023-03-02 RX ORDER — DIPHENHYDRAMINE HYDROCHLORIDE 50 MG/ML
25 INJECTION INTRAMUSCULAR; INTRAVENOUS
Status: COMPLETED | OUTPATIENT
Start: 2023-03-02 | End: 2023-03-02

## 2023-03-02 RX ORDER — INSULIN ASPART 100 [IU]/ML
0-5 INJECTION, SOLUTION INTRAVENOUS; SUBCUTANEOUS EVERY 6 HOURS PRN
Status: DISCONTINUED | OUTPATIENT
Start: 2023-03-03 | End: 2023-03-06 | Stop reason: HOSPADM

## 2023-03-02 RX ORDER — ONDANSETRON 2 MG/ML
4 INJECTION INTRAMUSCULAR; INTRAVENOUS EVERY 8 HOURS PRN
Status: DISCONTINUED | OUTPATIENT
Start: 2023-03-02 | End: 2023-03-06 | Stop reason: HOSPADM

## 2023-03-02 RX ORDER — METOCLOPRAMIDE HYDROCHLORIDE 5 MG/ML
10 INJECTION INTRAMUSCULAR; INTRAVENOUS
Status: COMPLETED | OUTPATIENT
Start: 2023-03-02 | End: 2023-03-02

## 2023-03-02 RX ORDER — DEXTROSE 50 % IN WATER (D50W) INTRAVENOUS SYRINGE
25
Status: COMPLETED | OUTPATIENT
Start: 2023-03-02 | End: 2023-03-02

## 2023-03-02 RX ORDER — GLUCAGON 1 MG
1 KIT INJECTION
Status: DISCONTINUED | OUTPATIENT
Start: 2023-03-03 | End: 2023-03-02

## 2023-03-02 RX ORDER — NOREPINEPHRINE BITARTRATE/D5W 4MG/250ML
0-3 PLASTIC BAG, INJECTION (ML) INTRAVENOUS CONTINUOUS
Status: DISCONTINUED | OUTPATIENT
Start: 2023-03-02 | End: 2023-03-02

## 2023-03-02 RX ORDER — MORPHINE SULFATE 4 MG/ML
4 INJECTION, SOLUTION INTRAMUSCULAR; INTRAVENOUS ONCE
Status: COMPLETED | OUTPATIENT
Start: 2023-03-02 | End: 2023-03-02

## 2023-03-02 RX ORDER — IODIXANOL 320 MG/ML
100 INJECTION, SOLUTION INTRAVASCULAR
Status: COMPLETED | OUTPATIENT
Start: 2023-03-02 | End: 2023-03-02

## 2023-03-02 RX ORDER — COVID-19 MOLECULAR TEST ASSAY
1 KIT MISCELLANEOUS ONCE
COMMUNITY
Start: 2022-11-08 | End: 2023-04-10

## 2023-03-02 RX ORDER — VANCOMYCIN HCL IN 5 % DEXTROSE 1G/250ML
1000 PLASTIC BAG, INJECTION (ML) INTRAVENOUS
Status: COMPLETED | OUTPATIENT
Start: 2023-03-02 | End: 2023-03-02

## 2023-03-02 RX ORDER — CEFEPIME HYDROCHLORIDE 1 G/50ML
1 INJECTION, SOLUTION INTRAVENOUS ONCE
Status: COMPLETED | OUTPATIENT
Start: 2023-03-02 | End: 2023-03-02

## 2023-03-02 RX ORDER — DEXTROSE MONOHYDRATE 100 MG/ML
INJECTION, SOLUTION INTRAVENOUS CONTINUOUS
Status: DISCONTINUED | OUTPATIENT
Start: 2023-03-02 | End: 2023-03-04

## 2023-03-02 RX ORDER — LABETALOL HYDROCHLORIDE 5 MG/ML
10 INJECTION, SOLUTION INTRAVENOUS
Status: COMPLETED | OUTPATIENT
Start: 2023-03-02 | End: 2023-03-02

## 2023-03-02 RX ORDER — HYDRALAZINE HYDROCHLORIDE 20 MG/ML
10 INJECTION INTRAMUSCULAR; INTRAVENOUS EVERY 6 HOURS PRN
Status: DISCONTINUED | OUTPATIENT
Start: 2023-03-02 | End: 2023-03-06 | Stop reason: HOSPADM

## 2023-03-02 RX ORDER — MORPHINE SULFATE 2 MG/ML
2 INJECTION, SOLUTION INTRAMUSCULAR; INTRAVENOUS EVERY 4 HOURS PRN
Status: DISCONTINUED | OUTPATIENT
Start: 2023-03-02 | End: 2023-03-06 | Stop reason: HOSPADM

## 2023-03-02 RX ORDER — DEXTROSE MONOHYDRATE 100 MG/ML
INJECTION, SOLUTION INTRAVENOUS
Status: COMPLETED | OUTPATIENT
Start: 2023-03-02 | End: 2023-03-02

## 2023-03-02 RX ORDER — BISACODYL 10 MG
10 SUPPOSITORY, RECTAL RECTAL DAILY PRN
Status: DISCONTINUED | OUTPATIENT
Start: 2023-03-02 | End: 2023-03-06 | Stop reason: HOSPADM

## 2023-03-02 RX ORDER — ACETAMINOPHEN 325 MG/1
650 TABLET ORAL EVERY 4 HOURS PRN
Status: DISCONTINUED | OUTPATIENT
Start: 2023-03-02 | End: 2023-03-06 | Stop reason: HOSPADM

## 2023-03-02 RX ORDER — MORPHINE SULFATE ORAL SOLUTION 10 MG/5ML
2 SOLUTION ORAL EVERY 4 HOURS PRN
Status: DISCONTINUED | OUTPATIENT
Start: 2023-03-02 | End: 2023-03-02

## 2023-03-02 RX ORDER — GLUCAGON 1 MG
1 KIT INJECTION
Status: DISCONTINUED | OUTPATIENT
Start: 2023-03-02 | End: 2023-03-06 | Stop reason: HOSPADM

## 2023-03-02 RX ORDER — ACETAMINOPHEN 325 MG/1
650 TABLET ORAL EVERY 8 HOURS PRN
Status: DISCONTINUED | OUTPATIENT
Start: 2023-03-02 | End: 2023-03-06 | Stop reason: HOSPADM

## 2023-03-02 RX ADMIN — MORPHINE SULFATE 2 MG: 2 INJECTION, SOLUTION INTRAMUSCULAR; INTRAVENOUS at 09:03

## 2023-03-02 RX ADMIN — MORPHINE SULFATE 4 MG: 4 INJECTION, SOLUTION INTRAMUSCULAR; INTRAVENOUS at 09:03

## 2023-03-02 RX ADMIN — HYDRALAZINE HYDROCHLORIDE 10 MG: 20 INJECTION INTRAMUSCULAR; INTRAVENOUS at 11:03

## 2023-03-02 RX ADMIN — LABETALOL HYDROCHLORIDE 10 MG: 5 INJECTION INTRAVENOUS at 09:03

## 2023-03-02 RX ADMIN — DEXTROSE MONOHYDRATE 25 G: 25 INJECTION, SOLUTION INTRAVENOUS at 12:03

## 2023-03-02 RX ADMIN — IODIXANOL 100 ML: 320 INJECTION, SOLUTION INTRAVASCULAR at 12:03

## 2023-03-02 RX ADMIN — DEXTROSE: 10 SOLUTION INTRAVENOUS at 12:03

## 2023-03-02 RX ADMIN — DEXTROSE MONOHYDRATE 25 G: 25 INJECTION, SOLUTION INTRAVENOUS at 01:03

## 2023-03-02 RX ADMIN — DEXTROSE MONOHYDRATE 25 G: 25 INJECTION, SOLUTION INTRAVENOUS at 10:03

## 2023-03-02 RX ADMIN — LABETALOL HYDROCHLORIDE 10 MG: 5 INJECTION INTRAVENOUS at 10:03

## 2023-03-02 RX ADMIN — DEXTROSE: 10 SOLUTION INTRAVENOUS at 02:03

## 2023-03-02 RX ADMIN — METOCLOPRAMIDE 10 MG: 5 INJECTION, SOLUTION INTRAMUSCULAR; INTRAVENOUS at 09:03

## 2023-03-02 RX ADMIN — VANCOMYCIN HYDROCHLORIDE 1000 MG: 1 INJECTION, POWDER, LYOPHILIZED, FOR SOLUTION INTRAVENOUS at 12:03

## 2023-03-02 RX ADMIN — HYDRALAZINE HYDROCHLORIDE 10 MG: 20 INJECTION INTRAMUSCULAR; INTRAVENOUS at 05:03

## 2023-03-02 RX ADMIN — DIPHENHYDRAMINE HYDROCHLORIDE 25 MG: 50 INJECTION, SOLUTION INTRAMUSCULAR; INTRAVENOUS at 09:03

## 2023-03-02 RX ADMIN — CEFEPIME HYDROCHLORIDE 1 G: 1 INJECTION, SOLUTION INTRAVENOUS at 11:03

## 2023-03-02 NOTE — CONSULTS
Pulmonary/Critical Care Consult      PATIENT NAME: Tabby Howard  MRN: [id number]  TODAY'S DATE: 2023  3:57 PM  ADMIT DATE: 3/2/2023  AGE: 33 y.o. : 1990    CONSULT REQUESTED BY: Roby Scott MD    REASON FOR CONSULT:   Critical care management    HPI:  The patient is a 33-year-old dialysis patient who presented to the hospital with nausea vomiting and abdominal pain.  She was found to be severely hypoglycemic and is requiring a D10 drip.  She has also been found to have an intussusception which is being medically managed at this time.  The patient is lethargic but has normal ABGs.    REVIEW OF SYSTEMS  Very lethargic    ALLERGIES  Review of patient's allergies indicates:   Allergen Reactions    Penicillins Hives       INPATIENT SCHEDULED MEDICATIONS   dextrose 50%        sodium chloride 0.9%  30 mL/kg Intravenous Once      dextrose 10 % in water (D10W) 125 mL/hr at 23 1418    NORepinephrine bitartrate-D5W         MEDICAL AND SURGICAL HISTORY  Past Medical History:   Diagnosis Date    CKD (chronic kidney disease), stage IV 2022    Diabetes mellitus due to underlying condition with unspecified complications 2022    Gastroparesis 2022    Heart failure with preserved ejection fraction 2022    EF 55% on 3/22    History of gastroesophageal reflux (GERD)     History of supraventricular tachycardia     Hyperkalemia 2022    Hypertensive emergency 2022    Sickle cell trait 2022    Type 2 diabetes mellitus      Past Surgical History:   Procedure Laterality Date     SECTION      x 3    COLONOSCOPY      COLONOSCOPY N/A 2022    Procedure: COLONOSCOPY;  Surgeon: Jagdeep Cedeno MD;  Location: [facility];  Service: Endoscopy;  Laterality: N/A;    ESOPHAGOGASTRODUODENOSCOPY N/A 10/18/2019    Procedure: ESOPHAGOGASTRODUODENOSCOPY (EGD);  Surgeon: Gianluca Mendez MD;  Location: [facility];  Service: Endoscopy;  Laterality: N/A;    ESOPHAGOGASTRODUODENOSCOPY N/A  08/24/2022    Procedure: EGD (ESOPHAGOGASTRODUODENOSCOPY);  Surgeon: Micky Paredes III, MD;  Location: [facility] ENDO;  Service: Endoscopy;  Laterality: N/A;    ESOPHAGOGASTRODUODENOSCOPY N/A 12/5/2022    Procedure: EGD (ESOPHAGOGASTRODUODENOSCOPY);  Surgeon: Marcelo Zhogn MD;  Location: [facility] ENDO;  Service: Endoscopy;  Laterality: N/A;    LAPAROSCOPIC CHOLECYSTECTOMY N/A 07/30/2022    Procedure: CHOLECYSTECTOMY, LAPAROSCOPIC;  Surgeon: Grey Perez MD;  Location: [facility] OR;  Service: General;  Laterality: N/A;    PLACEMENT OF DUAL-LUMEN VASCULAR CATHETER Left 07/12/2022    Procedure: INSERTION-CATHETER-JOSEPH;  Surgeon: Dionte Gan MD;  Location: [facility] OR;  Service: General;  Laterality: Left;    PLACEMENT OF DUAL-LUMEN VASCULAR CATHETER Right 07/26/2022    Procedure: INSERTION-CATHETER-Hemosplit;  Surgeon: Dionte Gan MD;  Location: [facility] OR;  Service: General;  Laterality: Right;       ALCOHOL, TOBACCO AND DRUG USE  Social History     Tobacco Use   Smoking Status Never   Smokeless Tobacco Never     Social History     Substance and Sexual Activity   Alcohol Use Not Currently     Social History     Substance and Sexual Activity   Drug Use No       FAMILY HISTORY  Family History   Problem Relation Age of Onset    Diabetes Mother     Diabetes Father        VITAL SIGNS (MOST RECENT)  Temp: 97.6 °F (36.4 °C) (03/02/23 0851)  Pulse: 68 (03/02/23 1537)  Resp: 15 (03/02/23 1537)  BP: (!) 171/99 (03/02/23 1340)  SpO2: 100 % (03/02/23 1537)    INTAKE AND OUTPUT (LAST 24 HOURS):No intake or output data in the 24 hours ending 03/02/23 1557    WEIGHT  Wt Readings from Last 1 Encounters:   03/02/23 59.9 kg (132 lb)       PHYSICAL EXAM  GENERAL:  Mid aged patient, lethargic, falls asleep during interview  HEENT: Pupils equal and reactive.  Heliotrope rash. Extraocular movements intact. Nose intact. Pharynx moist.  NECK: Supple.   HEART: Regular rate and rhythm. No murmur or gallop auscultated.  LUNGS: Clear to  auscultation and percussion. Lung excursion symmetrical. No change in fremitus. No adventitial noises.  There is a hemo split in the right IJ  ABDOMEN: Bowel sounds present. Non-tender, no masses palpated.  : Normal anatomy.  EXTREMITIES: Normal muscle tone and joint movement, no cyanosis or clubbing.   LYMPHATICS: No adenopathy palpated, no edema.  SKIN: Dry, intact, no lesions.   NEURO: Cranial nerves II-XII intact. Motor strength 5/5 bilaterally, upper and lower extremities.  Lethargic.  PSYCH:  Unable to assess.        CBC LAST (LAST 24 HOURS)  Recent Labs   Lab 03/02/23  0919 03/02/23  1535   WBC 8.00  --    RBC 2.61*  --    HGB 7.7*  --    HCT 23.6* 25*   MCV 90  --    MCH 29.5  --    MCHC 32.6  --    RDW 18.9*  --    *  --    MPV 9.7  --    GRAN 88.8*  7.1  --    LYMPH 5.5*  0.4*  --    MONO 4.3  0.3  --    BASO 0.02  --    NRBC 1*  --        CHEMISTRY LAST (LAST 24 HOURS)  Recent Labs   Lab 03/02/23  0919 03/02/23  1225 03/02/23  1314 03/02/23  1535     --   --   --    K 3.7  --   --   --    CL 98  --   --   --    CO2 26  --   --   --    ANIONGAP 16  --   --   --    BUN 35*  --   --   --    CREATININE 5.0*  --   --   --    *  --  144*  --    CALCIUM 8.6*  --   --   --    PH  --    < >  --  7.383   MG 2.0  --   --   --    ALBUMIN 3.3*  --   --   --    PROT 7.7  --   --   --    ALKPHOS 415*  --   --   --    *  --   --   --    AST 92*  --   --   --    BILITOT 3.5*  --   --   --     < > = values in this interval not displayed.         CARDIAC PROFILE (LAST 24 HOURS)  Recent Labs   Lab 03/02/23 0919   BNP >4,500*   TROPONINIHS 28.3*       LAST 7 DAYS MICROBIOLOGY   Microbiology Results (last 7 days)       Procedure Component Value Units Date/Time    Culture, Respiratory with Gram Stain [[id number]]     Order Status: No result Specimen: Respiratory from Sputum, Expectorated     Blood culture #2 **CANNOT BE ORDERED STAT** [[id number]] Collected: 03/02/23 1030    Order Status: Sent  Specimen: Blood from Peripheral, Antecubital, Left Updated: 03/02/23 1044    Blood culture #1 **CANNOT BE ORDERED STAT** [[id number]] Collected: 03/02/23 1015    Order Status: Sent Specimen: Blood from Peripheral, Antecubital, Right Updated: 03/02/23 1044            MOST RECENT IMAGING  CT Abdomen Pelvis With Contrast  CMS MANDATED QUALITY DATA - CT RADIATION - 436    All CT scans at this facility utilize dose modulation, iterative reconstruction, and/or weight based dosing when appropriate to reduce radiation dose to as low as reasonably achievable.    Reason: Nausea/vomiting    TECHNIQUE: CT abdomen and pelvis with 100 mL Omnipaque 350.    COMPARISON: CT abdomen and pelvis January 25, 2023.    FINDINGS:    Please refer to same day CTA chest for evaluation of the thoracic viscera.    Liver is normal size. Elongated left hepatic lobe noted. No enhancing liver lesions identified. The gallbladder has been removed. The pancreas, spleen and adrenal glands are unremarkable. The kidneys are normal size. No hydronephrosis or nephrolithiasis. The visualization of the ureters. The bladder is fluid-filled with no focal wall abnormalities. The uterus is unremarkable. The adnexal structures are grossly unremarkable. There is mild free fluid in the pelvic cul-de-sac.    Large and small bowel are normal caliber. There is no bowel wall thickening or inflammatory changes. There is a focus of intussusception involving the proximal jejunum (series 4 image 134 and series 6 image 28). Small bowel is normal caliber with no sign of obstruction demonstrated. Stomach is mildly fluid filled and grossly unremarkable. The appendix is normal.    The abdominal aorta is normal caliber. There is no intra-abdominal lymphadenopathy. No mesenteric fat stranding. There is trace free fluid in the pelvis and left pericolic gutter.    The abdominal aorta is normal caliber. Atherosclerosis of the intra-abdominal arteries observed. There is anasarca of the  ventral abdominal wall subcutaneous tissues.    No acute osseous abnormality.    IMPRESSION:    1.  Small focus of intussusception involving the proximal jejunum without evidence of obstruction. This most likely reflects transient intussusception. Clinical correlation recommended.  2.  Mild free fluid in the pelvic cul-de-sac and left pericolic gutter of undetermined significance.  3.  Additional incidental observations as described.    Electronically signed by:  Juaquin Will DO  3/2/2023 1:07 PM CST Workstation: [id number]  CTA Chest Non-Coronary (PE Studies)  CMS MANDATED QUALITY DATA-CT RADIATION-436    HISTORY: Pulmonary embolism suspected. Positive d-dimer.    FINDINGS: Thin axial imaging through the chest was performed with 100 mL of Visipaque 350 IV contrast, with sagittal and coronal images, MIPS, and or 3D reconstructions performed. All CT exams at this facility use dose modulation, iterative reconstruction, and or weight based dosing when appropriate to reduce radiation dose to as low as reasonably achievable.    Examination is of diagnostic quality as there is normal opacification of pulmonary arterial tree out to the tertiary order branch vessels without evidence of pruning, truncation, or webbing the pulmonary arterial system. No evidence of saddle embolus is established at the branch the main pulmonary trunk. Cardiac chambers maintain normal size and overall configuration. Moderate sized pericardial effusion.    Lung windows windows settings demonstrate evidence of diffuse areas of ground glass opacity and airspace consolidation that is globally although most pronounced in the upper lobes bilaterally, (right greater than left). Bilateral pleural effusion with evidence with the fluid is slightly more greater towards the right. Mild subpleural edema is noted.    Tracheal lumen appears patent without evidence of endobronchial lesions.    The upper abdominal cavity appears unremarkable. Marginal  backflow contrast into the superior vena cava suggestive of cardiac decompensation.    IMPRESSION:  1. No CT evidence of acute pulmonary thromboembolism.  2. Groundglass opacity changes and areas of airspace consolidation the upper lobes bilaterally with more prominent towards the right likely due to multifocal pneumonia.  3. Moderate intralobular and intralobar septal thickening attributed towards cardiac decompensation with enlarged cardiac chambers secondary to uncompensated congestive heart failure..    Electronically signed by:  Deandre Vitale MD  3/2/2023 1:02 PM CST Workstation: [id number]  CT Head Without Contrast  CT of THE HEAD WITHOUT CONTRAST    HISTORY: Mental status changes. Low glucose. Patient unresponsive.    Technical factors:   Spiral acquisition of the brain was generated at 3.0 mm thickness from the skull base to the skull vertex in helical fashion in the absence of intravenous contrast.  Additional coronal and sagittal reconstructed images were also included and reviewed.    COMPARISON: 11/7/2022    FINDINGS:  Appropriate demarcation of the gray-white matter interface is noted. No evidence of outstanding intracranial density changes. No evidence of masses, mass effect, or midline shift. Ventricles maintain normal size and overall configuration. Third ventricle appears normal. Prominent atheromatous calcifications of the supraclinoid ICAs bilaterally.    Calvarium appears intact. Mastoid air cells are well pneumatized. Stable left maxillary sinus mucoperiosteal thickening is again redemonstrated.    IMPRESSION:  1. No evidence of acute intracranial pathology.  2. Stable left maxillary sinus disease.    Electronically signed by:  Deandre Vitale MD  3/2/2023 12:57 PM CST Workstation: [id number]  X-Ray Chest 1 View  Reason: Nausea and vomiting.    FINDINGS:    Portable chest with comparison chest x-ray January 23, 2023 show unchanged enlarged cardiomediastinal silhouette. Right internal jugular  dialysis catheter is unchanged.  Right perihilar hazy ill-defined opacification noted. Left lung is clear. Pulmonary vasculature is normal. No acute osseous abnormality.    IMPRESSION:    Right perihilar hazy ill-defined opacification noted and could reflect infiltrate or atelectasis.    Electronically signed by:  Juaquin Will DO  3/2/2023 9:50 AM CST Workstation: [id number]      CURRENT VISIT EKG  Results for orders placed or performed during the hospital encounter of 03/02/23   EKG 12-lead    Narrative    Test Reason : R11.10,    Vent. Rate : 089 BPM     Atrial Rate : 089 BPM     P-R Int : 176 ms          QRS Dur : 080 ms      QT Int : 418 ms       P-R-T Axes : 037 -17 014 degrees     QTc Int : 508 ms    Normal sinus rhythm  Possible Left atrial enlargement  Prolonged QT  Abnormal ECG  When compared with ECG of 07-FEB-2023 05:36,  Questionable change in The axis    Referred By:             Confirmed By:        ECHOCARDIOGRAM RESULTS  Results for orders placed during the hospital encounter of 01/12/23    Echo    Interpretation Summary  · The left ventricle is normal in size with moderate concentric hypertrophy and normal systolic function.  · The estimated ejection fraction is 55%.  · Grade III left ventricular diastolic dysfunction.  · Normal right ventricular size with normal right ventricular systolic function.  · Moderate left atrial enlargement.  · Moderate to severe tricuspid regurgitation.  · Mild to moderate pulmonic regurgitation.  · Mild mitral regurgitation.  · Normal central venous pressure (3 mmHg).  · The estimated PA systolic pressure is 56 mmHg.  · There is pulmonary hypertension.  · Moderate to large circumferential pericardial effusion. No evidence of tamponade.        Oxygen INFORMATION   4 L       LAST ARTERIAL BLOOD GAS  ABG  Recent Labs   Lab 03/02/23  1535   PH 7.383   PO2 104*   PCO2 50.3*   HCO3 30.0*   BE 5       IMPRESSION AND PLAN  Multilobar pneumonia  Chronic hypercapnic respiratory  failure  Volume overload  Hypoglycemia  Intersussusception  Anemia  End-stage renal disease  Hyperglycemia  Transaminitis and hyperbilirubinemia  Pericardial effusion   Pulmonary hypertension  Uncontrolled hypertension    Continue D10  After the 4th hour of stable Accu-Cheks, may decrease to q.2 hours x4 then q.4 hours x4 then Q 8 hours  Continue antibiotics  Morphine for pain control as she is NPO  Hydralazine for hypertension  Dialysis per Dr. Benitez  NPO per surgery    Iveth Berman MD  [facility]  Department of Pulmonology  Date of Service: 03/02/2023  3:57 PM

## 2023-03-02 NOTE — ASSESSMENT & PLAN NOTE
"This patient does have evidence of infective focus  My overall impression is sepsis.  Source: Abdominal  Antibiotics given-   Antibiotics (72h ago, onward)    Start     Stop Route Frequency Ordered    03/02/23 1415  vancomycin - pharmacy to dose  ( Pneumonia - Moderate-High MDR )        See Hyperspace for full Linked Orders Report.    -- IV pharmacy to manage frequency 03/02/23 1320    03/02/23 1230  vancomycin in dextrose 5 % 1 gram/250 mL IVPB 1,000 mg        Note to Pharmacy: Ht: 5' 2" (1.575 m)  Wt: 59.9 kg (132 lb)      03/03 0029 IV ED 1 Time 03/02/23 1229        Latest lactate reviewed-  No results for input(s): LACTATE in the last 72 hours.    Fluid challenge Contraindicated- Fluid bolus is contraindicated in this patient due to End Stage Renal Disease     Post- resuscitation assessment No - Post resuscitation assessment not needed       Will Not start Pressors- Levophed for MAP of 65    Initially started on IV cefepime and IV vancomycin ED  MRSA, blood cultures, procalcitonin.  Today history of end-stage renal disease on dialysis did not order lactic or CRP may give false elevated reading  Will continue IV cefepime for possible pneumonia  CBC, CMP  Keep O2 greater than 90%    "

## 2023-03-02 NOTE — HPI
Patient is a 33-year-old  female with history of end-stage renal disease on dialysis Tuesday/Thursday/Saturday, sickle cell trait, hypertension, CHF EF 55, SVT, diabetes type 2, gastroparesis, seizure disorder.  Presents to the ED with complaints of nausea, multiple episodes of vomiting, mid abdominal pain started yesterday.  Well assessed in the emergency department initial glucose 141 however became hypoglycemic 20,28,20. She was started on a D10 drip and required 3 amps D50 with no long-term improvement.  CT abdomen pending.  On assessment patient is very drowsy due to hypoglycemia and difficult receive detailed information.  He reports no fever arrival, hematuria, chest pain, dyspnea, chills.    ED physician spoke surgeon with Dr. Marcos who will see patient.  Patient will be admitted ICU for sepsis, intussusception, severe hypoglycemia.    CT abdomen and pelvis results.      IMPRESSION:     1.  Small focus of intussusception involving the proximal jejunum without evidence of obstruction. This most likely reflects transient intussusception. Clinical correlation recommended.  2.  Mild free fluid in the pelvic cul-de-sac and left pericolic gutter of undetermined significance.  3.  Additional incidental observations as described.  Patient is a critical care patient      Patient is a critical care patient >r 30 minutes spent caring for critical care patient.

## 2023-03-02 NOTE — H&P
[facility] Medicine  History & Physical    Patient Name: Tabby Howard  MRN: [id number]  Patient Class: IP- Inpatient  Admission Date: 3/2/2023  Attending Physician: Roby Scott MD   Primary Care Provider: Primary Doctor No         Patient information was obtained from patient and ER records.     Subjective:     Principal Problem:Severe sepsis    Chief Complaint:   Chief Complaint   Patient presents with    Vomiting     Vomiting, onset this morning. Dialysis patient TTS, dialyzed Tuesday.         HPI: Patient is a 33-year-old  female with history of end-stage renal disease on dialysis Tuesday/Thursday/Saturday, sickle cell trait, hypertension, CHF EF 55, SVT, diabetes type 2, gastroparesis, seizure disorder.  Presents to the ED with complaints of nausea, multiple episodes of vomiting, mid abdominal pain started yesterday.  Well assessed in the emergency department initial glucose 141 however became hypoglycemic 20,28,20. She was started on a D10 drip and required 3 amps D50 with no long-term improvement.  CT abdomen pending.  On assessment patient is very drowsy due to hypoglycemia and difficult receive detailed information.  He reports no fever arrival, hematuria, chest pain, dyspnea, chills.    ED physician spoke surgeon with Dr. Marcos who will see patient.  Patient will be admitted ICU for sepsis, intussusception, severe hypoglycemia.    CT abdomen and pelvis results.      IMPRESSION:     1.  Small focus of intussusception involving the proximal jejunum without evidence of obstruction. This most likely reflects transient intussusception. Clinical correlation recommended.  2.  Mild free fluid in the pelvic cul-de-sac and left pericolic gutter of undetermined significance.  3.  Additional incidental observations as described.  Patient is a critical care patient      Patient is a critical care patient >r 30 minutes spent caring for critical care patient.      Past  Medical History:   Diagnosis Date    CKD (chronic kidney disease), stage IV 2022    Diabetes mellitus due to underlying condition with unspecified complications 2022    Gastroparesis 2022    Heart failure with preserved ejection fraction 2022    EF 55% on 3/22    History of gastroesophageal reflux (GERD)     History of supraventricular tachycardia     Hyperkalemia 2022    Hypertensive emergency 2022    Sickle cell trait 2022    Type 2 diabetes mellitus        Past Surgical History:   Procedure Laterality Date     SECTION      x 3    COLONOSCOPY      COLONOSCOPY N/A 2022    Procedure: COLONOSCOPY;  Surgeon: Jagdeep Cedeno MD;  Location: [facility];  Service: Endoscopy;  Laterality: N/A;    ESOPHAGOGASTRODUODENOSCOPY N/A 10/18/2019    Procedure: ESOPHAGOGASTRODUODENOSCOPY (EGD);  Surgeon: Gianluca Mendez MD;  Location: [facility];  Service: Endoscopy;  Laterality: N/A;    ESOPHAGOGASTRODUODENOSCOPY N/A 2022    Procedure: EGD (ESOPHAGOGASTRODUODENOSCOPY);  Surgeon: Micky Paredes III, MD;  Location: [facility];  Service: Endoscopy;  Laterality: N/A;    ESOPHAGOGASTRODUODENOSCOPY N/A 2022    Procedure: EGD (ESOPHAGOGASTRODUODENOSCOPY);  Surgeon: Marcelo Zhong MD;  Location: [facility];  Service: Endoscopy;  Laterality: N/A;    LAPAROSCOPIC CHOLECYSTECTOMY N/A 2022    Procedure: CHOLECYSTECTOMY, LAPAROSCOPIC;  Surgeon: Grey Perez MD;  Location: [facility];  Service: General;  Laterality: N/A;    PLACEMENT OF DUAL-LUMEN VASCULAR CATHETER Left 2022    Procedure: INSERTION-CATHETER-JOSEPH;  Surgeon: Dionte Gan MD;  Location: [facility];  Service: General;  Laterality: Left;    PLACEMENT OF DUAL-LUMEN VASCULAR CATHETER Right 2022    Procedure: INSERTION-CATHETER-Hemosplit;  Surgeon: Dionte Gan MD;  Location: [facility] OR;  Service: General;  Laterality: Right;       Review of patient's allergies indicates:   Allergen  Reactions    Penicillins Hives       No current facility-administered medications on file prior to encounter.     Current Outpatient Medications on File Prior to Encounter   Medication Sig    albuterol (PROVENTIL/VENTOLIN HFA) 90 mcg/actuation inhaler Inhale 2 puffs into the lungs every 6 (six) hours as needed for Wheezing. Rescue    amLODIPine (NORVASC) 10 MG tablet Take 1 tablet (10 mg total) by mouth once daily.    amLODIPine (NORVASC) 10 MG tablet Take 1 tablet every day by oral route for 30 days. (Patient taking differently: Take 10 mg by mouth once daily.)    apixaban (ELIQUIS) 5 mg Tab Take 5 mg by mouth 2 (two) times daily.    apixaban (ELIQUIS) 5 mg Tab Take 1 tablet (5 mg total) by mouth 2 (two) times daily.    apixaban (ELIQUIS) 5 mg Tab Take 1 tablet twice a day by oral route for 30 days. (Patient taking differently: Take 5 mg by mouth 2 (two) times daily.)    BINAXNOW COVID-19 AG SELF TEST Kit Take 1 Dose by mouth once.    carvediloL (COREG) 25 MG tablet Take 1 tablet twice a day by oral route for 30 days. (Patient taking differently: Take 25 mg by mouth 2 (two) times daily.)    cloNIDine 0.2 mg/24 hr td ptwk (CATAPRES) 0.2 mg/24 hr Apply 1 patch to skin once every week. (Patient taking differently: Place 1 patch onto the skin once a week.)    dicyclomine (BENTYL) 10 MG capsule Take 1 capsule (10 mg total) by mouth 3 (three) times daily.    dicyclomine (BENTYL) 10 MG capsule Take 1 capsule 3 times a day by oral route for 30 days. (Patient taking differently: Take 10 mg by mouth 3 (three) times daily.)    ferrous sulfate 325 (65 FE) MG EC tablet Take 325 mg by mouth once daily.    FLUoxetine 20 MG capsule Take 1 capsule (20 mg total) by mouth once daily.    FLUoxetine 20 MG capsule Take 1 capsule every day by oral route for 30 days. (Patient taking differently: Take 20 mg by mouth once daily.)    furosemide (LASIX) 40 MG tablet Take 40 mg by mouth 2 (two) times a day.    gabapentin  (NEURONTIN) 100 MG capsule Take 1 capsule (100 mg total) by mouth 2 (two) times daily.    gabapentin (NEURONTIN) 100 MG capsule Take 1 capsule by mouth 3 times daily (Patient taking differently: Take 100 mg by mouth 3 (three) times daily.)    hydrALAZINE (APRESOLINE) 100 MG tablet Take 1 tablet twice a day by oral route for 30 days. (Patient taking differently: Take 100 mg by mouth 2 (two) times daily.)    HYDROcodone-acetaminophen (NORCO)  mg per tablet Take 1 tablet by mouth every 4 (four) hours as needed for Pain.    HYDROmorphone (DILAUDID) 4 MG tablet Take 4 mg by mouth every 12 (twelve) hours as needed for Pain.    insulin aspart U-100 (NOVOLOG FLEXPEN U-100 INSULIN) 100 unit/mL (3 mL) InPn pen Inject 10 units under the skin 3 times a day before meals. (Patient taking differently: Inject 10 Units into the skin 3 (three) times daily with meals.)    insulin aspart U-100 (NOVOLOG) 100 unit/mL (3 mL) InPn pen Inject 1-10 Units into the skin before meals and at bedtime as needed (Hyperglycemia). Max daily dose 40 units.    insulin detemir U-100 (LEVEMIR FLEXTOUCH U-100 INSULN) 100 unit/mL (3 mL) InPn pen Inject 18 units every day by subcutaneous route in the evening. (Patient taking differently: Inject 18 Units into the skin every evening.)    insulin glargine 100 units/mL SubQ pen Inject 16 Units into the skin every evening.    isosorbide mononitrate (IMDUR) 60 MG 24 hr tablet Take 2 tablets (120 mg total) by mouth once daily.    isosorbide mononitrate (IMDUR) 60 MG 24 hr tablet Take 1 tablet every day by oral route for 30 days. (Patient taking differently: Take 60 mg by mouth once daily.)    levETIRAcetam (KEPPRA) 500 MG Tab Take 1 tablet (500 mg total) by mouth 2 (two) times daily.    levETIRAcetam (KEPPRA) 500 MG Tab Take 1 tablet twice a day by oral route for 30 days. (Patient taking differently: Take 500 mg by mouth once daily.)    losartan-hydrochlorothiazide 100-12.5 mg (HYZAAR) 100-12.5  "mg Tab Take 1 tablet by mouth once daily.    mirtazapine (REMERON SOL-TAB) 15 MG disintegrating tablet Take 1 tablet (15 mg total) by mouth nightly.    ondansetron (ZOFRAN) 4 MG tablet Take 1 tablet (4 mg total) by mouth every 8 (eight) hours as needed for Nausea.    ondansetron (ZOFRAN-ODT) 4 MG TbDL Place1 tablet on the tongue every 8 hours as needed (Patient taking differently: Take 4 mg by mouth every 6 (six) hours as needed.)    pantoprazole (PROTONIX) 40 MG tablet Take 40 mg by mouth once daily.    pantoprazole (PROTONIX) 40 MG tablet Take 1 tablet every day by oral route for 30 days. (Patient taking differently: Take 40 mg by mouth once daily.)    pen needle, diabetic (BD ULTRA-FINE MAGALIE PEN NEEDLE) 32 gauge x 5/32" Ndle Inject into the skin 5 times per day with insulin    promethazine (PHENERGAN) 25 MG suppository Place 1 suppository (25 mg total) rectally every 6 (six) hours as needed for Nausea.    torsemide (DEMADEX) 100 MG Tab Take 100 mg by mouth once daily.    [DISCONTINUED] atenoloL (TENORMIN) 50 MG tablet Take 1 tablet (50 mg total) by mouth every other day.    [DISCONTINUED] omeprazole (PRILOSEC) 20 MG capsule Take 2 capsules (40 mg total) by mouth once daily. for 10 days     Family History       Problem Relation (Age of Onset)    Diabetes Mother, Father          Tobacco Use    Smoking status: Never    Smokeless tobacco: Never   Substance and Sexual Activity    Alcohol use: Not Currently    Drug use: No    Sexual activity: Not Currently     Partners: Male     Birth control/protection: I.U.D.     Review of Systems   Constitutional:  Negative for activity change, appetite change, chills, diaphoresis, fatigue, fever and unexpected weight change.   HENT:  Negative for congestion, dental problem, facial swelling, nosebleeds, sinus pressure, sinus pain, sore throat and trouble swallowing.    Eyes:  Negative for pain and redness.   Respiratory:  Negative for apnea, cough, chest tightness, " shortness of breath and wheezing.    Cardiovascular:  Negative for chest pain, palpitations and leg swelling.   Gastrointestinal:  Positive for abdominal pain, diarrhea, nausea and vomiting. Negative for abdominal distention, anal bleeding, blood in stool and constipation.   Endocrine: Negative for polyphagia and polyuria.   Genitourinary:  Negative for decreased urine volume, difficulty urinating, dysuria, frequency, hematuria and urgency.   Musculoskeletal:  Negative for back pain, gait problem, joint swelling, myalgias, neck pain and neck stiffness.   Skin:  Negative for color change, pallor, rash and wound.   Neurological:  Negative for dizziness, seizures, syncope, facial asymmetry, speech difficulty, weakness, light-headedness, numbness and headaches.   Psychiatric/Behavioral:  Negative for agitation, behavioral problems, confusion, hallucinations, sleep disturbance and suicidal ideas.    Objective:     Vital Signs (Most Recent):  Temp: 97.6 °F (36.4 °C) (03/02/23 0851)  Pulse: 74 (03/02/23 1225)  Resp: 20 (03/02/23 0938)  BP: (!) 195/105 (03/02/23 1225)  SpO2: 99 % (03/02/23 1225)   Vital Signs (24h Range):  Temp:  [97.6 °F (36.4 °C)] 97.6 °F (36.4 °C)  Pulse:  [74-94] 74  Resp:  [19-20] 20  SpO2:  [83 %-99 %] 99 %  BP: (156-197)/() 195/105     Weight: 59.9 kg (132 lb)  Body mass index is 24.14 kg/m².    Physical Exam  Vitals reviewed.   Constitutional:       General: She is not in acute distress.     Appearance: She is ill-appearing. She is not toxic-appearing or diaphoretic.   HENT:      Head: Normocephalic.      Right Ear: External ear normal.      Left Ear: External ear normal.      Nose: No congestion or rhinorrhea.      Mouth/Throat:      Mouth: Mucous membranes are moist.      Pharynx: Oropharynx is clear. No oropharyngeal exudate or posterior oropharyngeal erythema.   Eyes:      Extraocular Movements: Extraocular movements intact.      Conjunctiva/sclera: Conjunctivae normal.      Pupils: Pupils  are equal, round, and reactive to light.   Cardiovascular:      Rate and Rhythm: Normal rate and regular rhythm.      Pulses: Normal pulses.      Heart sounds: Normal heart sounds.   Pulmonary:      Effort: Pulmonary effort is normal.      Breath sounds: Normal breath sounds.   Abdominal:      General: Abdomen is flat.      Palpations: Abdomen is soft.      Tenderness: There is abdominal tenderness.   Genitourinary:     Rectum: Normal.   Musculoskeletal:         General: Normal range of motion.      Cervical back: Normal range of motion and neck supple.   Skin:     General: Skin is warm and dry.      Capillary Refill: Capillary refill takes less than 2 seconds.      Coloration: Skin is pale.   Neurological:      Motor: Weakness present.      Comments: drowsy   Psychiatric:      Comments: Not able to acess         CRANIAL NERVES     CN III, IV, VI   Pupils are equal, round, and reactive to light.     Significant Labs: All pertinent labs within the past 24 hours have been reviewed.  Recent Lab Results  (Last 5 results in the past 24 hours)        03/02/23  1300   03/02/23  1249   03/02/23  1225   03/02/23  1208   03/02/23  1201        Allens Test     Pass           Site     RR           DelSys     Venti Mask           Flow     5           Mode     SPONT           POC BE     7           POC Glucose 34   29     161   <20       POC HCO3     32.2           POC PCO2     54.8           POC PH     7.377           POC PO2     113           POC SATURATED O2     98           POC TCO2     34           Sample     ARTERIAL                                  Significant Imaging: I have reviewed all pertinent imaging results/findings within the past 24 hours.    Assessment/Plan:     * Severe sepsis  This patient does have evidence of infective focus  My overall impression is sepsis.  Source: Abdominal  Antibiotics given-   Antibiotics (72h ago, onward)    Start     Stop Route Frequency Ordered    03/02/23 1415  vancomycin - pharmacy  "to dose  ( Pneumonia - Moderate-High MDR )        See Hyperspace for full Linked Orders Report.    -- IV pharmacy to manage frequency 03/02/23 1320    03/02/23 1230  vancomycin in dextrose 5 % 1 gram/250 mL IVPB 1,000 mg        Note to Pharmacy: Ht: 5' 2" (1.575 m)  Wt: 59.9 kg (132 lb)      03/03 0029 IV ED 1 Time 03/02/23 1229        Latest lactate reviewed-  No results for input(s): LACTATE in the last 72 hours.    Fluid challenge Contraindicated- Fluid bolus is contraindicated in this patient due to End Stage Renal Disease     Post- resuscitation assessment No - Post resuscitation assessment not needed       Will Not start Pressors- Levophed for MAP of 65    Initially started on IV cefepime and IV vancomycin ED  MRSA, blood cultures, procalcitonin.  Today history of end-stage renal disease on dialysis did not order lactic or CRP may give false elevated reading  Will continue IV cefepime for possible pneumonia  CBC, CMP  Keep O2 greater than 90%      Congestive heart failure with left ventricular diastolic dysfunction  Patient is identified as having Diastolic (HFpEF) heart failure that is Chronic. CHF is currently controlled. Latest ECHO performed and demonstrates- Results for orders placed during the hospital encounter of 01/12/23    Echo    Interpretation Summary  · The left ventricle is normal in size with moderate concentric hypertrophy and normal systolic function.  · The estimated ejection fraction is 55%.  · Grade III left ventricular diastolic dysfunction.  · Normal right ventricular size with normal right ventricular systolic function.  · Moderate left atrial enlargement.  · Moderate to severe tricuspid regurgitation.  · Mild to moderate pulmonic regurgitation.  · Mild mitral regurgitation.  · Normal central venous pressure (3 mmHg).  · The estimated PA systolic pressure is 56 mmHg.  · There is pulmonary hypertension.  · Moderate to large circumferential pericardial effusion. No evidence of " tamponade.  . Continue ACE/ARB and monitor clinical status closely. Monitor on telemetry. Patient is on CHF pathway.  Monitor strict Is&Os and daily weights.  Place on fluid restriction of none. Continue to stress to patient importance of self efficacy and  on diet for CHF. Last BNP reviewed- and noted below   Recent Labs   Lab 03/02/23 0919   BNP >4,500*   .  Patient will need hemodialysis today  Continue home medication once taking p.o. medications  Strict I&Os/daily weights    Severe diabetic hypoglycemia  Patient's FSGs are uncontrolled due to hypoglycemia on current medication regimen.  Last A1c reviewed-   Lab Results   Component Value Date    HGBA1C 7.5 (H) 12/23/2022     Most recent fingerstick glucose reviewed- No results for input(s): POCTGLUCOSE in the last 24 hours.  Current correctional scale  none  none anti-hyperglycemic dose as follows-   Antihyperglycemics (From admission, onward)    None        History of diabetes type 2 hold home medications  Hourly glucose monitoring  Hypoglycemia protocol  D10 125 mL an hour for now  IM glucagon 1 mg p.r.n. if glucose remains <30  Hold Oral hypoglycemics while patient is in the hospital.    Intussusception intestine  NPO  IV Pain medication prn  Ct abd findings- Consult surgery Dr. Thurman      Anemia of chronic kidney failure, stage 5  Initial hemoglobin 7.7, hematocrit 23.6  No active bleeding  Repeat CBC in a.m.      ESRD (end stage renal disease) on dialysis  Dialysis Tuesday/Thursday/Saturday  Consult nephrology    Seizure disorder  Start IV Keppra 500 mg bid for now; and change to po medication Keppra p.o. 500 BID once able to take by mouth  Seizure precautions        SVT (supraventricular tachycardia)  Home medication Eliquis 5 milligram b.i.d. will hold for now  Continuous cardiac monitoring        VTE Risk Mitigation (From admission, onward)         Ordered     IP VTE HIGH RISK PATIENT  Once         03/02/23 1320     Place sequential  compression device  Until discontinued         03/02/23 1320               Patient is a critical care patient > 30 minutes spent caring for critical care patient.    DORA Whitfield  Department of Hospital Medicine   [facility]

## 2023-03-02 NOTE — SUBJECTIVE & OBJECTIVE
Past Medical History:   Diagnosis Date    CKD (chronic kidney disease), stage IV 2022    Diabetes mellitus due to underlying condition with unspecified complications 2022    Gastroparesis 2022    Heart failure with preserved ejection fraction 2022    EF 55% on 3/22    History of gastroesophageal reflux (GERD)     History of supraventricular tachycardia     Hyperkalemia 2022    Hypertensive emergency 2022    Sickle cell trait 2022    Type 2 diabetes mellitus        Past Surgical History:   Procedure Laterality Date     SECTION      x 3    COLONOSCOPY      COLONOSCOPY N/A 2022    Procedure: COLONOSCOPY;  Surgeon: Jagdeep Cedeno MD;  Location: [facility];  Service: Endoscopy;  Laterality: N/A;    ESOPHAGOGASTRODUODENOSCOPY N/A 10/18/2019    Procedure: ESOPHAGOGASTRODUODENOSCOPY (EGD);  Surgeon: Gianluca Mendez MD;  Location: [facility];  Service: Endoscopy;  Laterality: N/A;    ESOPHAGOGASTRODUODENOSCOPY N/A 2022    Procedure: EGD (ESOPHAGOGASTRODUODENOSCOPY);  Surgeon: Micky Paredes III, MD;  Location: [facility];  Service: Endoscopy;  Laterality: N/A;    ESOPHAGOGASTRODUODENOSCOPY N/A 2022    Procedure: EGD (ESOPHAGOGASTRODUODENOSCOPY);  Surgeon: Marcelo Zhong MD;  Location: [facility];  Service: Endoscopy;  Laterality: N/A;    LAPAROSCOPIC CHOLECYSTECTOMY N/A 2022    Procedure: CHOLECYSTECTOMY, LAPAROSCOPIC;  Surgeon: Grey Perez MD;  Location: [facility];  Service: General;  Laterality: N/A;    PLACEMENT OF DUAL-LUMEN VASCULAR CATHETER Left 2022    Procedure: INSERTION-CATHETER-JOSEPH;  Surgeon: Dionte Gan MD;  Location: [facility];  Service: General;  Laterality: Left;    PLACEMENT OF DUAL-LUMEN VASCULAR CATHETER Right 2022    Procedure: INSERTION-CATHETER-Hemosplit;  Surgeon: Dionte Gan MD;  Location: [facility];  Service: General;  Laterality: Right;       Review of patient's allergies indicates:   Allergen Reactions     Penicillins Hives       No current facility-administered medications on file prior to encounter.     Current Outpatient Medications on File Prior to Encounter   Medication Sig    albuterol (PROVENTIL/VENTOLIN HFA) 90 mcg/actuation inhaler Inhale 2 puffs into the lungs every 6 (six) hours as needed for Wheezing. Rescue    amLODIPine (NORVASC) 10 MG tablet Take 1 tablet (10 mg total) by mouth once daily.    amLODIPine (NORVASC) 10 MG tablet Take 1 tablet every day by oral route for 30 days. (Patient taking differently: Take 10 mg by mouth once daily.)    apixaban (ELIQUIS) 5 mg Tab Take 5 mg by mouth 2 (two) times daily.    apixaban (ELIQUIS) 5 mg Tab Take 1 tablet (5 mg total) by mouth 2 (two) times daily.    apixaban (ELIQUIS) 5 mg Tab Take 1 tablet twice a day by oral route for 30 days. (Patient taking differently: Take 5 mg by mouth 2 (two) times daily.)    BINAXNOW COVID-19 AG SELF TEST Kit Take 1 Dose by mouth once.    carvediloL (COREG) 25 MG tablet Take 1 tablet twice a day by oral route for 30 days. (Patient taking differently: Take 25 mg by mouth 2 (two) times daily.)    cloNIDine 0.2 mg/24 hr td ptwk (CATAPRES) 0.2 mg/24 hr Apply 1 patch to skin once every week. (Patient taking differently: Place 1 patch onto the skin once a week.)    dicyclomine (BENTYL) 10 MG capsule Take 1 capsule (10 mg total) by mouth 3 (three) times daily.    dicyclomine (BENTYL) 10 MG capsule Take 1 capsule 3 times a day by oral route for 30 days. (Patient taking differently: Take 10 mg by mouth 3 (three) times daily.)    ferrous sulfate 325 (65 FE) MG EC tablet Take 325 mg by mouth once daily.    FLUoxetine 20 MG capsule Take 1 capsule (20 mg total) by mouth once daily.    FLUoxetine 20 MG capsule Take 1 capsule every day by oral route for 30 days. (Patient taking differently: Take 20 mg by mouth once daily.)    furosemide (LASIX) 40 MG tablet Take 40 mg by mouth 2 (two) times a day.    gabapentin (NEURONTIN) 100 MG capsule Take 1  capsule (100 mg total) by mouth 2 (two) times daily.    gabapentin (NEURONTIN) 100 MG capsule Take 1 capsule by mouth 3 times daily (Patient taking differently: Take 100 mg by mouth 3 (three) times daily.)    hydrALAZINE (APRESOLINE) 100 MG tablet Take 1 tablet twice a day by oral route for 30 days. (Patient taking differently: Take 100 mg by mouth 2 (two) times daily.)    HYDROcodone-acetaminophen (NORCO)  mg per tablet Take 1 tablet by mouth every 4 (four) hours as needed for Pain.    HYDROmorphone (DILAUDID) 4 MG tablet Take 4 mg by mouth every 12 (twelve) hours as needed for Pain.    insulin aspart U-100 (NOVOLOG FLEXPEN U-100 INSULIN) 100 unit/mL (3 mL) InPn pen Inject 10 units under the skin 3 times a day before meals. (Patient taking differently: Inject 10 Units into the skin 3 (three) times daily with meals.)    insulin aspart U-100 (NOVOLOG) 100 unit/mL (3 mL) InPn pen Inject 1-10 Units into the skin before meals and at bedtime as needed (Hyperglycemia). Max daily dose 40 units.    insulin detemir U-100 (LEVEMIR FLEXTOUCH U-100 INSULN) 100 unit/mL (3 mL) InPn pen Inject 18 units every day by subcutaneous route in the evening. (Patient taking differently: Inject 18 Units into the skin every evening.)    insulin glargine 100 units/mL SubQ pen Inject 16 Units into the skin every evening.    isosorbide mononitrate (IMDUR) 60 MG 24 hr tablet Take 2 tablets (120 mg total) by mouth once daily.    isosorbide mononitrate (IMDUR) 60 MG 24 hr tablet Take 1 tablet every day by oral route for 30 days. (Patient taking differently: Take 60 mg by mouth once daily.)    levETIRAcetam (KEPPRA) 500 MG Tab Take 1 tablet (500 mg total) by mouth 2 (two) times daily.    levETIRAcetam (KEPPRA) 500 MG Tab Take 1 tablet twice a day by oral route for 30 days. (Patient taking differently: Take 500 mg by mouth once daily.)    losartan-hydrochlorothiazide 100-12.5 mg (HYZAAR) 100-12.5 mg Tab Take 1 tablet by mouth once daily.     "mirtazapine (REMERON SOL-TAB) 15 MG disintegrating tablet Take 1 tablet (15 mg total) by mouth nightly.    ondansetron (ZOFRAN) 4 MG tablet Take 1 tablet (4 mg total) by mouth every 8 (eight) hours as needed for Nausea.    ondansetron (ZOFRAN-ODT) 4 MG TbDL Place1 tablet on the tongue every 8 hours as needed (Patient taking differently: Take 4 mg by mouth every 6 (six) hours as needed.)    pantoprazole (PROTONIX) 40 MG tablet Take 40 mg by mouth once daily.    pantoprazole (PROTONIX) 40 MG tablet Take 1 tablet every day by oral route for 30 days. (Patient taking differently: Take 40 mg by mouth once daily.)    pen needle, diabetic (BD ULTRA-FINE MAGALIE PEN NEEDLE) 32 gauge x 5/32" Ndle Inject into the skin 5 times per day with insulin    promethazine (PHENERGAN) 25 MG suppository Place 1 suppository (25 mg total) rectally every 6 (six) hours as needed for Nausea.    torsemide (DEMADEX) 100 MG Tab Take 100 mg by mouth once daily.    [DISCONTINUED] atenoloL (TENORMIN) 50 MG tablet Take 1 tablet (50 mg total) by mouth every other day.    [DISCONTINUED] omeprazole (PRILOSEC) 20 MG capsule Take 2 capsules (40 mg total) by mouth once daily. for 10 days     Family History       Problem Relation (Age of Onset)    Diabetes Mother, Father          Tobacco Use    Smoking status: Never    Smokeless tobacco: Never   Substance and Sexual Activity    Alcohol use: Not Currently    Drug use: No    Sexual activity: Not Currently     Partners: Male     Birth control/protection: I.U.D.     Review of Systems   Constitutional:  Negative for activity change, appetite change, chills, diaphoresis, fatigue, fever and unexpected weight change.   HENT:  Negative for congestion, dental problem, facial swelling, nosebleeds, sinus pressure, sinus pain, sore throat and trouble swallowing.    Eyes:  Negative for pain and redness.   Respiratory:  Negative for apnea, cough, chest tightness, shortness of breath and wheezing.    Cardiovascular:  Negative " for chest pain, palpitations and leg swelling.   Gastrointestinal:  Positive for abdominal pain, diarrhea, nausea and vomiting. Negative for abdominal distention, anal bleeding, blood in stool and constipation.   Endocrine: Negative for polyphagia and polyuria.   Genitourinary:  Negative for decreased urine volume, difficulty urinating, dysuria, frequency, hematuria and urgency.   Musculoskeletal:  Negative for back pain, gait problem, joint swelling, myalgias, neck pain and neck stiffness.   Skin:  Negative for color change, pallor, rash and wound.   Neurological:  Negative for dizziness, seizures, syncope, facial asymmetry, speech difficulty, weakness, light-headedness, numbness and headaches.   Psychiatric/Behavioral:  Negative for agitation, behavioral problems, confusion, hallucinations, sleep disturbance and suicidal ideas.    Objective:     Vital Signs (Most Recent):  Temp: 97.6 °F (36.4 °C) (03/02/23 0851)  Pulse: 74 (03/02/23 1225)  Resp: 20 (03/02/23 0938)  BP: (!) 195/105 (03/02/23 1225)  SpO2: 99 % (03/02/23 1225)   Vital Signs (24h Range):  Temp:  [97.6 °F (36.4 °C)] 97.6 °F (36.4 °C)  Pulse:  [74-94] 74  Resp:  [19-20] 20  SpO2:  [83 %-99 %] 99 %  BP: (156-197)/() 195/105     Weight: 59.9 kg (132 lb)  Body mass index is 24.14 kg/m².    Physical Exam  Vitals reviewed.   Constitutional:       General: She is not in acute distress.     Appearance: She is ill-appearing. She is not toxic-appearing or diaphoretic.   HENT:      Head: Normocephalic.      Right Ear: External ear normal.      Left Ear: External ear normal.      Nose: No congestion or rhinorrhea.      Mouth/Throat:      Mouth: Mucous membranes are moist.      Pharynx: Oropharynx is clear. No oropharyngeal exudate or posterior oropharyngeal erythema.   Eyes:      Extraocular Movements: Extraocular movements intact.      Conjunctiva/sclera: Conjunctivae normal.      Pupils: Pupils are equal, round, and reactive to light.   Cardiovascular:       Rate and Rhythm: Normal rate and regular rhythm.      Pulses: Normal pulses.      Heart sounds: Normal heart sounds.   Pulmonary:      Effort: Pulmonary effort is normal.      Breath sounds: Normal breath sounds.   Abdominal:      General: Abdomen is flat.      Palpations: Abdomen is soft.      Tenderness: There is abdominal tenderness.   Genitourinary:     Rectum: Normal.   Musculoskeletal:         General: Normal range of motion.      Cervical back: Normal range of motion and neck supple.   Skin:     General: Skin is warm and dry.      Capillary Refill: Capillary refill takes less than 2 seconds.      Coloration: Skin is pale.   Neurological:      Motor: Weakness present.      Comments: drowsy   Psychiatric:      Comments: Not able to acess         CRANIAL NERVES     CN III, IV, VI   Pupils are equal, round, and reactive to light.     Significant Labs: All pertinent labs within the past 24 hours have been reviewed.  Recent Lab Results  (Last 5 results in the past 24 hours)        03/02/23  1300   03/02/23  1249   03/02/23  1225   03/02/23  1208   03/02/23  1201        Allens Test     Pass           Site     RR           DelSys     Venti Mask           Flow     5           Mode     SPONT           POC BE     7           POC Glucose 34   29     161   <20       POC HCO3     32.2           POC PCO2     54.8           POC PH     7.377           POC PO2     113           POC SATURATED O2     98           POC TCO2     34           Sample     ARTERIAL                                  Significant Imaging: I have reviewed all pertinent imaging results/findings within the past 24 hours.

## 2023-03-02 NOTE — PLAN OF CARE
[facility]  Initial Discharge Assessment       Primary Care Provider: Primary Doctor No    Admission Diagnosis: Vomiting [R11.10]    Admission Date: 3/2/2023  Expected Discharge Date: 3/5/2023    Discharge Barriers Identified: None    Payor: MEDICAID / Plan: [employer] BLUE [place] [place] / Product Type: Managed Medicaid /     Extended Emergency Contact Information  Primary Emergency Contact: Garcia Howard  Mobile Phone: [phone]  Relation: Father  Preferred language: English   needed? No  Secondary Emergency Contact: Tanya Howard  Mobile Phone: [phone]  Relation: Step parent  Preferred language: English   needed? No    Discharge Plan A: Home with family  Discharge Plan B: Home with family      [facility] - [place], [place] - [address]  [address]  [facility] [address]  Phone: [phone] Fax: [phone]    [id number] [address] AT [facility]  [address]  [facility] [address]  Phone: [phone] Fax: [phone]    [place], [place] - [address]  [address]  [facility] [address]  Phone: [phone] Fax: [phone]      Initial Assessment (most recent)       Adult Discharge Assessment - 03/02/23 1612          Discharge Assessment    Assessment Type Discharge Planning Assessment     Confirmed/corrected address, phone number and insurance Yes     Confirmed Demographics Correct on Facesheet     People in Home parent(s)     Facility Arrived From: Home     Do you expect to return to your current living situation? Yes     Do you have help at home or someone to help you manage your care at home? Yes     Prior to hospitilization cognitive status: Alert/Oriented     Current cognitive status: Alert/Oriented     Walking or Climbing Stairs ambulation difficulty, requires equipment     Dressing/Bathing bathing difficulty, assistance 1 person     Equipment  Currently Used at Home walker, rolling     Readmission within 30 days? Yes     Patient currently being followed by outpatient case management? No     Do you currently have service(s) that help you manage your care at home? No     Do you take prescription medications? Yes     Do you have prescription coverage? Yes     Coverage [place] Medicaid/ Healthy Blue     How do you get to doctors appointments? family or friend will provide     Are you on dialysis? Yes     Dialysis Name and Scheduled days [address], [facility]     Do you take coumadin? No     Discharge Plan A Home with family     Discharge Plan B Home with family     DME Needed Upon Discharge  none     Discharge Barriers Identified None

## 2023-03-02 NOTE — ASSESSMENT & PLAN NOTE
Start IV Keppra 500 mg bid for now; and change to po medication Keppra p.o. 500 BID once able to take by mouth  Seizure precautions

## 2023-03-02 NOTE — ED PROVIDER NOTES
Encounter Date: 3/2/2023       History     Chief Complaint   Patient presents with    Vomiting     Vomiting, onset this morning. Dialysis patient TTS, dialyzed Tuesday.      33-year-old female with past medical history of end-stage renal disease on dialysis, diabetes, congestive heart failure, history of SVT, sickle cell trait, hypertensive emergencies, presents emergency department with nausea, vomiting, abdominal pain, diarrhea.  Patient goes to dialysis on  and Saturday.  Her last dialysis was on Tuesday.  She says that since last night she is had multiple episodes of vomiting and proximally 10, nonbloody nonbilious.  She is also had about 5 episodes of loose watery stool she says.  She is also had some lower abdominal pain.  She says that she can not keep anything down.  She denies any headache.  She does not have any chest pain or any shortness of breath.  She complains of lower type abdominal pain denies any vaginal discharge.  Patient has had a cholecystectomy and  in the past.    Review of patient's allergies indicates:   Allergen Reactions    Penicillins Hives     Past Medical History:   Diagnosis Date    CKD (chronic kidney disease), stage IV 2022    Diabetes mellitus due to underlying condition with unspecified complications 2022    Gastroparesis 2022    Heart failure with preserved ejection fraction 2022    EF 55% on 3/22    History of gastroesophageal reflux (GERD)     History of supraventricular tachycardia     Hyperkalemia 2022    Hypertensive emergency 2022    Sickle cell trait 2022    Type 2 diabetes mellitus      Past Surgical History:   Procedure Laterality Date     SECTION      x 3    COLONOSCOPY      COLONOSCOPY N/A 2022    Procedure: COLONOSCOPY;  Surgeon: Jagdeep Cedeno MD;  Location: [facility];  Service: Endoscopy;  Laterality: N/A;    ESOPHAGOGASTRODUODENOSCOPY N/A 10/18/2019    Procedure: ESOPHAGOGASTRODUODENOSCOPY  (EGD);  Surgeon: Gianluca Mendez MD;  Location: [facility] ENDO;  Service: Endoscopy;  Laterality: N/A;    ESOPHAGOGASTRODUODENOSCOPY N/A 08/24/2022    Procedure: EGD (ESOPHAGOGASTRODUODENOSCOPY);  Surgeon: Micky Paredes III, MD;  Location: [facility] ENDO;  Service: Endoscopy;  Laterality: N/A;    ESOPHAGOGASTRODUODENOSCOPY N/A 12/5/2022    Procedure: EGD (ESOPHAGOGASTRODUODENOSCOPY);  Surgeon: Marcelo Zhong MD;  Location: [facility] ENDO;  Service: Endoscopy;  Laterality: N/A;    LAPAROSCOPIC CHOLECYSTECTOMY N/A 07/30/2022    Procedure: CHOLECYSTECTOMY, LAPAROSCOPIC;  Surgeon: Grey Perez MD;  Location: [facility];  Service: General;  Laterality: N/A;    PLACEMENT OF DUAL-LUMEN VASCULAR CATHETER Left 07/12/2022    Procedure: INSERTION-CATHETER-JOSEPH;  Surgeon: Dionte Gan MD;  Location: [facility] OR;  Service: General;  Laterality: Left;    PLACEMENT OF DUAL-LUMEN VASCULAR CATHETER Right 07/26/2022    Procedure: INSERTION-CATHETER-Hemosplit;  Surgeon: Dionte Gan MD;  Location: [facility] OR;  Service: General;  Laterality: Right;     Family History   Problem Relation Age of Onset    Diabetes Mother     Diabetes Father      Social History     Tobacco Use    Smoking status: Never    Smokeless tobacco: Never   Substance Use Topics    Alcohol use: Not Currently    Drug use: No     Review of Systems   Constitutional:  Negative for fever.   HENT:  Negative for congestion and sore throat.    Respiratory:  Negative for shortness of breath.    Cardiovascular:  Negative for chest pain.   Gastrointestinal:  Positive for abdominal pain, diarrhea, nausea and vomiting.   Genitourinary:  Negative for dysuria and flank pain.   Musculoskeletal:  Positive for back pain. Negative for neck pain.   Skin:  Negative for rash.   Neurological:  Negative for weakness and headaches.   Hematological:  Does not bruise/bleed easily.   Psychiatric/Behavioral:  Negative for confusion.    All other systems reviewed and are negative.    Physical Exam      Initial Vitals [03/02/23 0851]   BP Pulse Resp Temp SpO2   (!) 197/118 94 19 97.6 °F (36.4 °C) 97 %      MAP       --         Physical Exam    Nursing note and vitals reviewed.  Constitutional: She appears well-developed and well-nourished. No distress.   Patient appears uncomfortable and in pain.   HENT:   Head: Normocephalic and atraumatic.   Mouth/Throat: No oropharyngeal exudate.   Eyes: Conjunctivae and EOM are normal. Pupils are equal, round, and reactive to light.   Neck: Neck supple. No tracheal deviation present.   Normal range of motion.  Cardiovascular:  Normal rate, regular rhythm, normal heart sounds and intact distal pulses.           No murmur heard.  Pulmonary/Chest: Breath sounds normal. No stridor. No respiratory distress. She has no wheezes. She has no rhonchi. She has no rales.   Right chest, subclavian region there is a tunneled dialysis catheter present.   Abdominal: Abdomen is soft. She exhibits no distension. There is abdominal tenderness (Mild periumbilical.). There is no rebound and no guarding.   Musculoskeletal:         General: No tenderness or edema. Normal range of motion.      Cervical back: Normal range of motion and neck supple.     Neurological: She is alert and oriented to person, place, and time. She has normal strength. No cranial nerve deficit or sensory deficit. GCS score is 15. GCS eye subscore is 4. GCS verbal subscore is 5. GCS motor subscore is 6.   Skin: Skin is warm and dry. Capillary refill takes less than 2 seconds. No rash noted. No erythema. No pallor.   Psychiatric: She has a normal mood and affect. Thought content normal.       ED Course   Procedures  Labs Reviewed   B-TYPE NATRIURETIC PEPTIDE - Abnormal; Notable for the following components:       Result Value    BNP >4,500 (*)     All other components within normal limits   COMPREHENSIVE METABOLIC PANEL - Abnormal; Notable for the following components:    Glucose 141 (*)     BUN 35 (*)     Creatinine 5.0 (*)      Calcium 8.6 (*)     Albumin 3.3 (*)     Total Bilirubin 3.5 (*)     Alkaline Phosphatase 415 (*)     AST 92 (*)      (*)     eGFR 11.0 (*)     All other components within normal limits   TROPONIN I HIGH SENSITIVITY - Abnormal; Notable for the following components:    Troponin I High Sensitivity 28.3 (*)     All other components within normal limits   CBC W/ AUTO DIFFERENTIAL - Abnormal; Notable for the following components:    RBC 2.61 (*)     Hemoglobin 7.7 (*)     Hematocrit 23.6 (*)     RDW 18.9 (*)     Platelets 131 (*)     Immature Granulocytes 0.8 (*)     Immature Grans (Abs) 0.06 (*)     Lymph # 0.4 (*)     nRBC 1 (*)     Gran % 88.8 (*)     Lymph % 5.5 (*)     All other components within normal limits   GLUCOSE, RANDOM - Abnormal; Notable for the following components:    Glucose 144 (*)     All other components within normal limits   POCT GLUCOSE - Abnormal; Notable for the following components:    POC Glucose 283 (*)     All other components within normal limits   POCT GLUCOSE - Abnormal; Notable for the following components:    POC Glucose 28 (*)     All other components within normal limits   POCT GLUCOSE - Abnormal; Notable for the following components:    POC Glucose 20 (*)     All other components within normal limits   POCT GLUCOSE - Abnormal; Notable for the following components:    POC Glucose <20 (*)     All other components within normal limits   POCT GLUCOSE - Abnormal; Notable for the following components:    POC Glucose 161 (*)     All other components within normal limits   ISTAT PROCEDURE - Abnormal; Notable for the following components:    POC PCO2 54.8 (*)     POC PO2 113 (*)     POC HCO3 32.2 (*)     POC TCO2 34 (*)     All other components within normal limits   POCT GLUCOSE - Abnormal; Notable for the following components:    POC Glucose 29 (*)     All other components within normal limits   POCT GLUCOSE - Abnormal; Notable for the following components:    POC Glucose 34 (*)      All other components within normal limits   POCT GLUCOSE - Abnormal; Notable for the following components:    POC Glucose 116 (*)     All other components within normal limits   CULTURE, RESPIRATORY   LIPASE   MAGNESIUM   BETA - HYDROXYBUTYRATE, SERUM   SARS-COV-2 RNA AMPLIFICATION, QUAL   INFLUENZA A AND B ANTIGEN    Narrative:     Specimen Source->Nasopharyngeal Swab   HCG, QUANTITATIVE   HCG, QUANTITATIVE   PROCALCITONIN   POCT GLUCOSE   POCT GLUCOSE MONITORING CONTINUOUS   POCT GLUCOSE MONITORING CONTINUOUS     Results for orders placed or performed during the hospital encounter of 03/02/23   Blood culture #1 **CANNOT BE ORDERED STAT**    Specimen: Peripheral, Antecubital, Right; Blood   Result Value Ref Range    Blood Culture, Routine No Growth to date    Blood culture #2 **CANNOT BE ORDERED STAT**    Specimen: Peripheral, Antecubital, Left; Blood   Result Value Ref Range    Blood Culture, Routine No Growth to date    Brain natriuretic peptide   Result Value Ref Range    BNP >4,500 (H) 0 - 99 pg/mL   Comprehensive metabolic panel   Result Value Ref Range    Sodium 140 136 - 145 mmol/L    Potassium 3.7 3.5 - 5.1 mmol/L    Chloride 98 95 - 110 mmol/L    CO2 26 23 - 29 mmol/L    Glucose 141 (H) 70 - 110 mg/dL    BUN 35 (H) 6 - 20 mg/dL    Creatinine 5.0 (H) 0.5 - 1.4 mg/dL    Calcium 8.6 (L) 8.7 - 10.5 mg/dL    Total Protein 7.7 6.0 - 8.4 g/dL    Albumin 3.3 (L) 3.5 - 5.2 g/dL    Total Bilirubin 3.5 (H) 0.1 - 1.0 mg/dL    Alkaline Phosphatase 415 (H) 55 - 135 U/L    AST 92 (H) 10 - 40 U/L     (H) 10 - 44 U/L    Anion Gap 16 8 - 16 mmol/L    eGFR 11.0 (A) >60 mL/min/1.73 m^2   Lipase   Result Value Ref Range    Lipase 35 4 - 60 U/L   Magnesium   Result Value Ref Range    Magnesium 2.0 1.6 - 2.6 mg/dL   TROPONIN I HIGH SENSITIVITY   Result Value Ref Range    Troponin I High Sensitivity 28.3 (H) 0.0 - 14.9 pg/mL   CBC auto differential   Result Value Ref Range    WBC 8.00 3.90 - 12.70 K/uL    RBC 2.61 (L)  4.00 - 5.40 M/uL    Hemoglobin 7.7 (L) 12.0 - 16.0 g/dL    Hematocrit 23.6 (L) 37.0 - 48.5 %    MCV 90 82 - 98 fL    MCH 29.5 27.0 - 31.0 pg    MCHC 32.6 32.0 - 36.0 g/dL    RDW 18.9 (H) 11.5 - 14.5 %    Platelets 131 (L) 150 - 450 K/uL    MPV 9.7 9.2 - 12.9 fL    Immature Granulocytes 0.8 (H) 0.0 - 0.5 %    Gran # (ANC) 7.1 1.8 - 7.7 K/uL    Immature Grans (Abs) 0.06 (H) 0.00 - 0.04 K/uL    Lymph # 0.4 (L) 1.0 - 4.8 K/uL    Mono # 0.3 0.3 - 1.0 K/uL    Eos # 0.0 0.0 - 0.5 K/uL    Baso # 0.02 0.00 - 0.20 K/uL    nRBC 1 (A) 0 /100 WBC    Gran % 88.8 (H) 38.0 - 73.0 %    Lymph % 5.5 (L) 18.0 - 48.0 %    Mono % 4.3 4.0 - 15.0 %    Eosinophil % 0.3 0.0 - 8.0 %    Basophil % 0.3 0.0 - 1.9 %    Differential Method Automated    Beta - Hydroxybutyrate, Serum   Result Value Ref Range    Beta-Hydroxybutyrate 0.0 0.0 - 0.5 mmol/L   COVID-19 Rapid Screening   Result Value Ref Range    SARS-CoV-2 RNA, Amplification, Qual Negative Negative   Influenza antigen Nasopharyngeal Swab   Result Value Ref Range    Influenza A, Molecular Negative Negative    Influenza B, Molecular Negative Negative    Flu A & B Source Nasal swab    HCG, Quantitative   Result Value Ref Range    HCG Quant 1.3 See Text mIU/mL   Procalcitonin   Result Value Ref Range    Procalcitonin 1.65 (H) 0.00 - 0.50 ng/mL   Glucose, random   Result Value Ref Range    Glucose 144 (H) 70 - 110 mg/dL   Lactic acid, plasma   Result Value Ref Range    Lactate (Lactic Acid) 1.4 0.5 - 1.9 mmol/L   POCT glucose   Result Value Ref Range    POC Glucose 86 70 - 110   POCT glucose   Result Value Ref Range    POC Glucose 283 (H) 70 - 110   POCT glucose   Result Value Ref Range    POC Glucose 28 (LL) 70 - 110   POCT glucose   Result Value Ref Range    POC Glucose 20 (LL) 70 - 110   POCT glucose   Result Value Ref Range    POC Glucose <20 (LL) 70 - 110   POCT glucose   Result Value Ref Range    POC Glucose 161 (H) 70 - 110   ISTAT PROCEDURE   Result Value Ref Range    POC PH 7.377 7.35  - 7.45    POC PCO2 54.8 (H) 35 - 45 mmHg    POC PO2 113 (H) 80 - 100 mmHg    POC HCO3 32.2 (H) 24 - 28 mmol/L    POC BE 7 -2 to 2 mmol/L    POC SATURATED O2 98 95 - 100 %    POC TCO2 34 (H) 23 - 27 mmol/L    Sample ARTERIAL     Site RR     Allens Test Pass     DelSys Venti Mask     Mode SPONT     Flow 5    POCT glucose   Result Value Ref Range    POC Glucose 29 (LL) 70 - 110   POCT glucose   Result Value Ref Range    POC Glucose 34 (LL) 70 - 110   POCT glucose   Result Value Ref Range    POC Glucose 116 (H) 70 - 110   POCT glucose   Result Value Ref Range    POC Glucose 97 70 - 110   POCT glucose   Result Value Ref Range    POC Glucose 104 70 - 110   ISTAT PROCEDURE   Result Value Ref Range    POC PH 7.383 7.35 - 7.45    POC PCO2 50.3 (H) 35 - 45 mmHg    POC PO2 104 (H) 80 - 100 mmHg    POC HCO3 30.0 (H) 24 - 28 mmol/L    POC BE 5 -2 to 2 mmol/L    POC SATURATED O2 98 95 - 100 %    POC Glucose 101 70 - 110 mg/dL    POC Sodium 137 136 - 145 mmol/L    POC Potassium 4.0 3.5 - 5.1 mmol/L    POC TCO2 32 (H) 23 - 27 mmol/L    POC Ionized Calcium 1.05 (L) 1.06 - 1.42 mmol/L    POC Hematocrit 25 (L) 36 - 54 %PCV    Sample ARTERIAL     Site RR     Allens Test Pass     DelSys Aero Mask     Mode SPONT     Flow 5     Sp02 100    POCT glucose   Result Value Ref Range    POC Glucose 125 (H) 70 - 110   POCT glucose   Result Value Ref Range    POC Glucose 135 (H) 70 - 110   POCT glucose   Result Value Ref Range    POC Glucose 156 (H) 70 - 110          ECG Results              EKG 12-lead (Final result)  Result time 03/02/23 16:24:09      Final result by Interface, Lab [facility] (03/02/23 16:24:09)                   Narrative:    Test Reason : R11.10,    Vent. Rate : 089 BPM     Atrial Rate : 089 BPM     P-R Int : 176 ms          QRS Dur : 080 ms      QT Int : 418 ms       P-R-T Axes : 037 -17 014 degrees     QTc Int : 508 ms    Normal sinus rhythm  Possible Left atrial enlargement  Prolonged QT  Abnormal ECG  When compared with  ECG of 07-FEB-2023 05:36,  Questionable change in The axis  Confirmed by Obed Smith MD ([id number]) on 3/2/2023 4:23:53 PM    Referred By:             Confirmed By:Obed Smith MD                                  Imaging Results              CT Abdomen Pelvis With Contrast (Final result)  Result time 03/02/23 13:07:23      Final result by Juaquin Will MD (03/02/23 13:07:23)                   Narrative:    CMS MANDATED QUALITY DATA - CT RADIATION - 436    All CT scans at this facility utilize dose modulation, iterative reconstruction, and/or weight based dosing when appropriate to reduce radiation dose to as low as reasonably achievable.        Reason: Nausea/vomiting    TECHNIQUE: CT abdomen and pelvis with 100 mL Omnipaque 350.    COMPARISON: CT abdomen and pelvis January 25, 2023.    FINDINGS:    Please refer to same day CTA chest for evaluation of the thoracic viscera.    Liver is normal size. Elongated left hepatic lobe noted. No enhancing liver lesions identified. The gallbladder has been removed. The pancreas, spleen and adrenal glands are unremarkable. The kidneys are normal size. No hydronephrosis or nephrolithiasis. The visualization of the ureters. The bladder is fluid-filled with no focal wall abnormalities. The uterus is unremarkable. The adnexal structures are grossly unremarkable. There is mild free fluid in the pelvic cul-de-sac.    Large and small bowel are normal caliber. There is no bowel wall thickening or inflammatory changes. There is a focus of intussusception involving the proximal jejunum (series 4 image 134 and series 6 image 28). Small bowel is normal caliber with no sign of obstruction demonstrated. Stomach is mildly fluid filled and grossly unremarkable. The appendix is normal.    The abdominal aorta is normal caliber. There is no intra-abdominal lymphadenopathy. No mesenteric fat stranding. There is trace free fluid in the pelvis and left pericolic gutter.    The abdominal aorta is  normal caliber. Atherosclerosis of the intra-abdominal arteries observed. There is anasarca of the ventral abdominal wall subcutaneous tissues.    No acute osseous abnormality.    IMPRESSION:    1.  Small focus of intussusception involving the proximal jejunum without evidence of obstruction. This most likely reflects transient intussusception. Clinical correlation recommended.  2.  Mild free fluid in the pelvic cul-de-sac and left pericolic gutter of undetermined significance.  3.  Additional incidental observations as described.    Electronically signed by:  Juaquin Will DO  3/2/2023 1:07 PM CST Workstation: [id number]                                     CTA Chest Non-Coronary (PE Studies) (Final result)  Result time 03/02/23 13:02:16      Final result by Deandre Vitale Jr., MD (03/02/23 13:02:16)                   Narrative:    CMS MANDATED QUALITY DATA-CT RADIATION-436    HISTORY: Pulmonary embolism suspected. Positive d-dimer.      FINDINGS: Thin axial imaging through the chest was performed with 100 mL of Visipaque 350 IV contrast, with sagittal and coronal images, MIPS, and or 3D reconstructions performed. All CT exams at this facility use dose modulation, iterative reconstruction, and or weight based dosing when appropriate to reduce radiation dose to as low as reasonably achievable.    Examination is of diagnostic quality as there is normal opacification of pulmonary arterial tree out to the tertiary order branch vessels without evidence of pruning, truncation, or webbing the pulmonary arterial system. No evidence of saddle embolus is established at the branch the main pulmonary trunk. Cardiac chambers maintain normal size and overall configuration. Moderate sized pericardial effusion.    Lung windows windows settings demonstrate evidence of diffuse areas of ground glass opacity and airspace consolidation that is globally although most pronounced in the upper lobes bilaterally, (right greater than  left). Bilateral pleural effusion with evidence with the fluid is slightly more greater towards the right. Mild subpleural edema is noted.    Tracheal lumen appears patent without evidence of endobronchial lesions.    The upper abdominal cavity appears unremarkable. Marginal backflow contrast into the superior vena cava suggestive of cardiac decompensation.        IMPRESSION:  1. No CT evidence of acute pulmonary thromboembolism.  2. Groundglass opacity changes and areas of airspace consolidation the upper lobes bilaterally with more prominent towards the right likely due to multifocal pneumonia.  3. Moderate intralobular and intralobar septal thickening attributed towards cardiac decompensation with enlarged cardiac chambers secondary to uncompensated congestive heart failure..    Electronically signed by:  Deandre Vitale MD  3/2/2023 1:02 PM [facility] Workstation: [id number]                                     CT Head Without Contrast (Final result)  Result time 03/02/23 12:57:06      Final result by Deandre Vitale Jr., MD (03/02/23 12:57:06)                   Narrative:    CT of THE HEAD WITHOUT CONTRAST    HISTORY: Mental status changes. Low glucose. Patient unresponsive.    Technical factors:   Spiral acquisition of the brain was generated at 3.0 mm thickness from the skull base to the skull vertex in helical fashion in the absence of intravenous contrast.  Additional coronal and sagittal reconstructed images were also included and reviewed.    COMPARISON: 11/7/2022    FINDINGS:  Appropriate demarcation of the gray-white matter interface is noted. No evidence of outstanding intracranial density changes. No evidence of masses, mass effect, or midline shift. Ventricles maintain normal size and overall configuration. Third ventricle appears normal. Prominent atheromatous calcifications of the supraclinoid ICAs bilaterally.    Calvarium appears intact. Mastoid air cells are well pneumatized. Stable left maxillary  sinus mucoperiosteal thickening is again redemonstrated.    IMPRESSION:  1. No evidence of acute intracranial pathology.  2. Stable left maxillary sinus disease.    Electronically signed by:  Deandre Vitale MD  3/2/2023 12:57 PM CST Workstation: [id number]                                     X-Ray Chest 1 View (Final result)  Result time 03/02/23 09:50:09      Final result by Juaquin Will MD (03/02/23 09:50:09)                   Narrative:    Reason: Nausea and vomiting.    FINDINGS:    Portable chest with comparison chest x-ray January 23, 2023 show unchanged enlarged cardiomediastinal silhouette. Right internal jugular dialysis catheter is unchanged.  Right perihilar hazy ill-defined opacification noted. Left lung is clear. Pulmonary vasculature is normal. No acute osseous abnormality.    IMPRESSION:    Right perihilar hazy ill-defined opacification noted and could reflect infiltrate or atelectasis.    Electronically signed by:  Juaquin Will DO  3/2/2023 9:50 AM CST Workstation: [id number]                                     Medications   sodium chloride 0.9% bolus 1,797 mL 1,797 mL ( Intravenous Canceled Entry 3/2/23 1330)   acetaminophen tablet 650 mg (has no administration in time range)   bisacodyL suppository 10 mg (has no administration in time range)   ondansetron injection 4 mg (has no administration in time range)   vancomycin - pharmacy to dose (has no administration in time range)   dextrose 50% injection 12.5 g (has no administration in time range)   dextrose 50% injection 25 g (has no administration in time range)   acetaminophen tablet 650 mg (has no administration in time range)   dextrose 10 % infusion ( Intravenous Rate/Dose Change 3/2/23 8516)   glucagon (human recombinant) injection 1 mg (has no administration in time range)   hydrALAZINE injection 10 mg (10 mg Intravenous Given 3/2/23 1704)   morphine injection 2 mg (has no administration in time range)   metoclopramide HCl injection 10  mg (10 mg Intravenous Given 3/2/23 0938)   diphenhydrAMINE injection 25 mg (25 mg Intravenous Given 3/2/23 0938)   morphine injection 4 mg (4 mg Intravenous Given 3/2/23 0938)   labetaloL injection 10 mg (10 mg Intravenous Given 3/2/23 0947)   cefepime in dextrose 5 % 1 gram/50 mL IVPB 1 g (0 g Intravenous Stopped 3/2/23 1132)   labetaloL injection 10 mg (10 mg Intravenous Given 3/2/23 1020)   dextrose 50 % in water (D50W) injection 25 g (25 g Intravenous Given 3/2/23 1035)   iodixanoL (VISIPAQUE 320) injection 100 mL (100 mLs Intravenous Given 3/2/23 1243)   dextrose 10 % infusion ( Intravenous New Bag 3/2/23 1210)   dextrose 50 % in water (D50W) injection 25 g (25 g Intravenous Given 3/2/23 1205)   vancomycin in dextrose 5 % 1 gram/250 mL IVPB 1,000 mg (0 mg Intravenous Stopped 3/2/23 1358)   dextrose 50 % in water (D50W) injection 25 g (25 g Intravenous Given 3/2/23 1303)   dextrose 50% injection (  Override Pull 3/2/23 1315)     Medical Decision Making:   Clinical Tests:   Lab Tests: Ordered and Reviewed  Radiological Study: Ordered and Reviewed  Medical Tests: Ordered and Reviewed  ED Management:  Emergent evaluation of a 33-year-old female who presents emergency department with abdominal pain, vomiting diarrhea.  Initially had normal blood sugar.  Emergency department blood sugar started to drop precipitously which required several rounds of IV D50.  She was eventually placed on a D10 drip for persistent hypoglycemia.  Chest x-ray finding concerning for pneumonia.  Given patient's clinical deterioration and abdominal pain and chest x-ray findings I elected to send her for CT scan of her chest abdomen pelvis.  Patient's mental status worsened which is likely secondary to her persistent hypoglycemia .  I did add on a CT scan of her brain.  Patient did not have any acute intracranial hemorrhage.  Patient has multifocal multilobar pneumonia.  She also had some intussusception.  I discussed with the hospitalist,  nephrologis, and he general surgeon.  Patient will be admitted to the hospital for further care and evaluation of symptoms.     A dictation software program was used for this note.  Please expect some simple typographical  errors in this note.           Attending Attestation:             Attending ED Notes:   Attending Critical Care:   Critical Care Times:   Direct Patient Care (initial evaluation, reassessments, and time considering the case)................................................................10 minutes.   Additional History from reviewing old medical records or taking additional history from the family, EMS, PCP, etc.......................10 minutes.   Ordering, Reviewing, and Interpreting Diagnostic Studies...............................................................................................................10 minutes.   Documentation..................................................................................................................................................................................5 minutes.   ==============================================================  · Total Critical Care Time - exclusive of procedural time: 35 minutes.  ==============================================================  Critical care was necessary to treat or prevent imminent or life-threatening deterioration of the following conditions:  Sepsis, pneumonia, hypoglycemia.   Critical care was time spent personally by me on the following activities: obtaining history from patient or relative, examination of patient, review of x-rays / CT sent with the patient, ordering lab, x-rays, and/or EKG, development of treatment plan with patient or relative, ordering and performing treatments and interventions, interpretation of cardiac measurements and re-evaluation of patient's conition.   Critical Care Condition: potentially life-threatening       ED Course as of 03/02/23 1951   Thu Mar 02, 2023   8803 EKG  09:31 normal sinus rhythm rate of 89.  Prolonged QT interval.  No ST elevation or depression.  No STEMI.  EKG interpreted independently by me.   [JR]   1014 Patient reassess, she is resting more comfortably in bed.  Pain seems be improved. [JR]   1034 Patient states she feels like her blood sugar is dropping.  We checked it and it was 28.  Patient getting an amp of D50.  Patient states that she did not take her insulin this morning. [JR]   1204 Patient returned from CT scan and patient decreased responsiveness again.  Patient blood sugar checked and showing 14.  Patient will be given another amp of D50.  She will be placed on a D10 drip. [JR]   1205 Patient drowsy again.  Will add on CT scan of the head as patient attempted to go to CT scan but was too drowsy so sugar was checked and was low and we are repleting sugar again. [JR]   1257 Priscilla from [facility] will admit  [JR]   1328 Dr. Thurman will evaluate the patient, given the intussusception findings on CT scan. [JR]      ED Course User Index  [JR] Rob Shea DO                 Clinical Impression:   Final diagnoses:  [R11.10] Vomiting  [J18.9] Pneumonia of both lower lobes due to infectious organism  [G93.41] Metabolic encephalopathy  [E16.2] Hypoglycemia  [I16.1] Hypertensive emergency        ED Disposition Condition    Admit Stable                Rob Shea DO  03/02/23 1952

## 2023-03-02 NOTE — H&P
[facility]  History & Physical    Patient Name: Tabby Howard  MRN: [id number]  Patient Class: IP- Inpatient  Admission Date: 3/2/2023  Attending Physician: Roby Scott MD   Primary Care Provider: Primary Doctor No         Patient information was obtained from patient and ER records.     Subjective:     Principal Problem:Severe sepsis    Chief Complaint:   Chief Complaint   Patient presents with    Vomiting     Vomiting, onset this morning. Dialysis patient TTS, dialyzed Tuesday.         HPI: Patient is a 33-year-old  female with history of end-stage renal disease on dialysis Tuesday/Thursday/Saturday, sickle cell trait, hypertension, CHF EF 55, SVT, diabetes type 2, gastroparesis, seizure disorder.  Presents to the ED with complaints of nausea, multiple episodes of vomiting, mid abdominal pain started yesterday.  Well assessed in the emergency department initial glucose 141 however became hypoglycemic 20,28,20. She was started on a D10 drip and required 3 amps D50 with no long-term improvement.  CT abdomen pending.  On assessment patient is very drowsy due to hypoglycemia and difficult receive detailed information.  He reports no fever arrival, hematuria, chest pain, dyspnea, chills.    ED physician spoke surgeon with Dr. Marcos who will see patient.  Patient will be admitted ICU for sepsis, intussusception, severe hypoglycemia.    CT abdomen and pelvis results.      IMPRESSION:     1.  Small focus of intussusception involving the proximal jejunum without evidence of obstruction. This most likely reflects transient intussusception. Clinical correlation recommended.  2.  Mild free fluid in the pelvic cul-de-sac and left pericolic gutter of undetermined significance.  3.  Additional incidental observations as described.      Past Medical History:   Diagnosis Date    CKD (chronic kidney disease), stage IV 4/12/2022    Diabetes mellitus due to underlying  condition with unspecified complications 2022    Gastroparesis 2022    Heart failure with preserved ejection fraction 2022    EF 55% on 3/22    History of gastroesophageal reflux (GERD)     History of supraventricular tachycardia     Hyperkalemia 2022    Hypertensive emergency 2022    Sickle cell trait 2022    Type 2 diabetes mellitus        Past Surgical History:   Procedure Laterality Date     SECTION      x 3    COLONOSCOPY      COLONOSCOPY N/A 2022    Procedure: COLONOSCOPY;  Surgeon: Jagdeep Cedeno MD;  Location: [facility];  Service: Endoscopy;  Laterality: N/A;    ESOPHAGOGASTRODUODENOSCOPY N/A 10/18/2019    Procedure: ESOPHAGOGASTRODUODENOSCOPY (EGD);  Surgeon: Gianluca Mendez MD;  Location[facility];  Service: Endoscopy;  Laterality: N/A;    ESOPHAGOGASTRODUODENOSCOPY N/A 2022    Procedure: EGD (ESOPHAGOGASTRODUODENOSCOPY);  Surgeon: Micky Paredes III, MD;  Location: [facility];  Service: Endoscopy;  Laterality: N/A;    ESOPHAGOGASTRODUODENOSCOPY N/A 2022    Procedure: EGD (ESOPHAGOGASTRODUODENOSCOPY);  Surgeon: Marcelo Zhong MD;  Location: [facility];  Service: Endoscopy;  Laterality: N/A;    LAPAROSCOPIC CHOLECYSTECTOMY N/A 2022    Procedure: CHOLECYSTECTOMY, LAPAROSCOPIC;  Surgeon: Grey Perez MD;  Location: [facility];  Service: General;  Laterality: N/A;    PLACEMENT OF DUAL-LUMEN VASCULAR CATHETER Left 2022    Procedure: INSERTION-CATHETER-JOSEPH;  Surgeon: Dionte Gan MD;  Location: [facility] OR;  Service: General;  Laterality: Left;    PLACEMENT OF DUAL-LUMEN VASCULAR CATHETER Right 2022    Procedure: INSERTION-CATHETER-Hemosplit;  Surgeon: Dionte Gan MD;  Location: [facility] OR;  Service: General;  Laterality: Right;       Review of patient's allergies indicates:   Allergen Reactions    Penicillins Hives       No current facility-administered medications on file prior to encounter.     Current  Outpatient Medications on File Prior to Encounter   Medication Sig    albuterol (PROVENTIL/VENTOLIN HFA) 90 mcg/actuation inhaler Inhale 2 puffs into the lungs every 6 (six) hours as needed for Wheezing. Rescue    amLODIPine (NORVASC) 10 MG tablet Take 1 tablet (10 mg total) by mouth once daily.    amLODIPine (NORVASC) 10 MG tablet Take 1 tablet every day by oral route for 30 days. (Patient taking differently: Take 10 mg by mouth once daily.)    apixaban (ELIQUIS) 5 mg Tab Take 5 mg by mouth 2 (two) times daily.    apixaban (ELIQUIS) 5 mg Tab Take 1 tablet (5 mg total) by mouth 2 (two) times daily.    apixaban (ELIQUIS) 5 mg Tab Take 1 tablet twice a day by oral route for 30 days. (Patient taking differently: Take 5 mg by mouth 2 (two) times daily.)    BINAXNOW COVID-19 AG SELF TEST Kit Take 1 Dose by mouth once.    carvediloL (COREG) 25 MG tablet Take 1 tablet twice a day by oral route for 30 days. (Patient taking differently: Take 25 mg by mouth 2 (two) times daily.)    cloNIDine 0.2 mg/24 hr td ptwk (CATAPRES) 0.2 mg/24 hr Apply 1 patch to skin once every week. (Patient taking differently: Place 1 patch onto the skin once a week.)    dicyclomine (BENTYL) 10 MG capsule Take 1 capsule (10 mg total) by mouth 3 (three) times daily.    dicyclomine (BENTYL) 10 MG capsule Take 1 capsule 3 times a day by oral route for 30 days. (Patient taking differently: Take 10 mg by mouth 3 (three) times daily.)    ferrous sulfate 325 (65 FE) MG EC tablet Take 325 mg by mouth once daily.    FLUoxetine 20 MG capsule Take 1 capsule (20 mg total) by mouth once daily.    FLUoxetine 20 MG capsule Take 1 capsule every day by oral route for 30 days. (Patient taking differently: Take 20 mg by mouth once daily.)    furosemide (LASIX) 40 MG tablet Take 40 mg by mouth 2 (two) times a day.    gabapentin (NEURONTIN) 100 MG capsule Take 1 capsule (100 mg total) by mouth 2 (two) times daily.    gabapentin (NEURONTIN) 100 MG capsule  Take 1 capsule by mouth 3 times daily (Patient taking differently: Take 100 mg by mouth 3 (three) times daily.)    hydrALAZINE (APRESOLINE) 100 MG tablet Take 1 tablet twice a day by oral route for 30 days. (Patient taking differently: Take 100 mg by mouth 2 (two) times daily.)    HYDROcodone-acetaminophen (NORCO)  mg per tablet Take 1 tablet by mouth every 4 (four) hours as needed for Pain.    HYDROmorphone (DILAUDID) 4 MG tablet Take 4 mg by mouth every 12 (twelve) hours as needed for Pain.    insulin aspart U-100 (NOVOLOG FLEXPEN U-100 INSULIN) 100 unit/mL (3 mL) InPn pen Inject 10 units under the skin 3 times a day before meals. (Patient taking differently: Inject 10 Units into the skin 3 (three) times daily with meals.)    insulin aspart U-100 (NOVOLOG) 100 unit/mL (3 mL) InPn pen Inject 1-10 Units into the skin before meals and at bedtime as needed (Hyperglycemia). Max daily dose 40 units.    insulin detemir U-100 (LEVEMIR FLEXTOUCH U-100 INSULN) 100 unit/mL (3 mL) InPn pen Inject 18 units every day by subcutaneous route in the evening. (Patient taking differently: Inject 18 Units into the skin every evening.)    insulin glargine 100 units/mL SubQ pen Inject 16 Units into the skin every evening.    isosorbide mononitrate (IMDUR) 60 MG 24 hr tablet Take 2 tablets (120 mg total) by mouth once daily.    isosorbide mononitrate (IMDUR) 60 MG 24 hr tablet Take 1 tablet every day by oral route for 30 days. (Patient taking differently: Take 60 mg by mouth once daily.)    levETIRAcetam (KEPPRA) 500 MG Tab Take 1 tablet (500 mg total) by mouth 2 (two) times daily.    levETIRAcetam (KEPPRA) 500 MG Tab Take 1 tablet twice a day by oral route for 30 days. (Patient taking differently: Take 500 mg by mouth once daily.)    losartan-hydrochlorothiazide 100-12.5 mg (HYZAAR) 100-12.5 mg Tab Take 1 tablet by mouth once daily.    mirtazapine (REMERON SOL-TAB) 15 MG disintegrating tablet Take 1 tablet (15 mg  "total) by mouth nightly.    ondansetron (ZOFRAN) 4 MG tablet Take 1 tablet (4 mg total) by mouth every 8 (eight) hours as needed for Nausea.    ondansetron (ZOFRAN-ODT) 4 MG TbDL Place1 tablet on the tongue every 8 hours as needed (Patient taking differently: Take 4 mg by mouth every 6 (six) hours as needed.)    pantoprazole (PROTONIX) 40 MG tablet Take 40 mg by mouth once daily.    pantoprazole (PROTONIX) 40 MG tablet Take 1 tablet every day by oral route for 30 days. (Patient taking differently: Take 40 mg by mouth once daily.)    pen needle, diabetic (BD ULTRA-FINE MAGALIE PEN NEEDLE) 32 gauge x 5/32" Ndle Inject into the skin 5 times per day with insulin    promethazine (PHENERGAN) 25 MG suppository Place 1 suppository (25 mg total) rectally every 6 (six) hours as needed for Nausea.    torsemide (DEMADEX) 100 MG Tab Take 100 mg by mouth once daily.    [DISCONTINUED] atenoloL (TENORMIN) 50 MG tablet Take 1 tablet (50 mg total) by mouth every other day.    [DISCONTINUED] omeprazole (PRILOSEC) 20 MG capsule Take 2 capsules (40 mg total) by mouth once daily. for 10 days     Family History       Problem Relation (Age of Onset)    Diabetes Mother, Father          Tobacco Use    Smoking status: Never    Smokeless tobacco: Never   Substance and Sexual Activity    Alcohol use: Not Currently    Drug use: No    Sexual activity: Not Currently     Partners: Male     Birth control/protection: I.U.D.     Review of Systems   Constitutional:  Negative for activity change, appetite change, chills, diaphoresis, fatigue, fever and unexpected weight change.   HENT:  Negative for congestion, dental problem, facial swelling, nosebleeds, sinus pressure, sinus pain, sore throat and trouble swallowing.    Eyes:  Negative for pain and redness.   Respiratory:  Negative for apnea, cough, chest tightness, shortness of breath and wheezing.    Cardiovascular:  Negative for chest pain, palpitations and leg swelling. "   Gastrointestinal:  Positive for abdominal pain, diarrhea, nausea and vomiting. Negative for abdominal distention, anal bleeding, blood in stool and constipation.   Endocrine: Negative for polyphagia and polyuria.   Genitourinary:  Negative for decreased urine volume, difficulty urinating, dysuria, frequency, hematuria and urgency.   Musculoskeletal:  Negative for back pain, gait problem, joint swelling, myalgias, neck pain and neck stiffness.   Skin:  Negative for color change, pallor, rash and wound.   Neurological:  Negative for dizziness, seizures, syncope, facial asymmetry, speech difficulty, weakness, light-headedness, numbness and headaches.   Psychiatric/Behavioral:  Negative for agitation, behavioral problems, confusion, hallucinations, sleep disturbance and suicidal ideas.    Objective:     Vital Signs (Most Recent):  Temp: 97.6 °F (36.4 °C) (03/02/23 0851)  Pulse: 74 (03/02/23 1225)  Resp: 20 (03/02/23 0938)  BP: (!) 195/105 (03/02/23 1225)  SpO2: 99 % (03/02/23 1225)   Vital Signs (24h Range):  Temp:  [97.6 °F (36.4 °C)] 97.6 °F (36.4 °C)  Pulse:  [74-94] 74  Resp:  [19-20] 20  SpO2:  [83 %-99 %] 99 %  BP: (156-197)/() 195/105     Weight: 59.9 kg (132 lb)  Body mass index is 24.14 kg/m².    Physical Exam  Vitals reviewed.   Constitutional:       General: She is not in acute distress.     Appearance: She is ill-appearing. She is not toxic-appearing or diaphoretic.   HENT:      Head: Normocephalic.      Right Ear: External ear normal.      Left Ear: External ear normal.      Nose: No congestion or rhinorrhea.      Mouth/Throat:      Mouth: Mucous membranes are moist.      Pharynx: Oropharynx is clear. No oropharyngeal exudate or posterior oropharyngeal erythema.   Eyes:      Extraocular Movements: Extraocular movements intact.      Conjunctiva/sclera: Conjunctivae normal.      Pupils: Pupils are equal, round, and reactive to light.   Cardiovascular:      Rate and Rhythm: Normal rate and regular  rhythm.      Pulses: Normal pulses.      Heart sounds: Normal heart sounds.   Pulmonary:      Effort: Pulmonary effort is normal.      Breath sounds: Normal breath sounds.   Abdominal:      General: Abdomen is flat. Bowel sounds are normal.      Palpations: Abdomen is soft.   Genitourinary:     Rectum: Normal.   Musculoskeletal:         General: Normal range of motion.      Cervical back: Normal range of motion and neck supple.   Skin:     General: Skin is warm and dry.      Capillary Refill: Capillary refill takes less than 2 seconds.   Psychiatric:         Mood and Affect: Mood normal.         CRANIAL NERVES     CN III, IV, VI   Pupils are equal, round, and reactive to light.     Significant Labs: All pertinent labs within the past 24 hours have been reviewed.  Recent Lab Results  (Last 5 results in the past 24 hours)        03/02/23  1300   03/02/23  1249   03/02/23  1225   03/02/23  1208   03/02/23  1201        Allens Test     Pass           Site     RR           DelSys     Venti Mask           Flow     5           Mode     SPONT           POC BE     7           POC Glucose 34   29     161   <20       POC HCO3     32.2           POC PCO2     54.8           POC PH     7.377           POC PO2     113           POC SATURATED O2     98           POC TCO2     34           Sample     ARTERIAL                                  Significant Imaging: I have reviewed all pertinent imaging results/findings within the past 24 hours.    Assessment/Plan:     * Severe sepsis  This patient does have evidence of infective focus  My overall impression is sepsis.  Source: Abdominal  Antibiotics given-   Antibiotics (72h ago, onward)    Start     Stop Route Frequency Ordered    03/02/23 1415  vancomycin - pharmacy to dose  ( Pneumonia - Moderate-High MDR )        See Hyperspace for full Linked Orders Report.    -- IV pharmacy to manage frequency 03/02/23 1320    03/02/23 1230  vancomycin in dextrose 5 % 1 gram/250 mL IVPB 1,000 mg     "    Note to Pharmacy: Ht: 5' 2" (1.575 m)  Wt: 59.9 kg (132 lb)      03/03 0029 IV ED 1 Time 03/02/23 1229        Latest lactate reviewed-  No results for input(s): LACTATE in the last 72 hours.    Fluid challenge Contraindicated- Fluid bolus is contraindicated in this patient due to End Stage Renal Disease     Post- resuscitation assessment No - Post resuscitation assessment not needed       Will Not start Pressors- Levophed for MAP of 65    Initially started on IV cefepime and IV vancomycin ED  MRSA, blood cultures, procalcitonin.  Today history of end-stage renal disease on dialysis did not order lactic or CRP may give false elevated reading  Will continue IV cefepime for possible pneumonia  CBC, CMP  Keep O2 greater than 90%      Congestive heart failure with left ventricular diastolic dysfunction  Patient is identified as having Diastolic (HFpEF) heart failure that is Chronic. CHF is currently controlled. Latest ECHO performed and demonstrates- Results for orders placed during the hospital encounter of 01/12/23    Echo    Interpretation Summary  · The left ventricle is normal in size with moderate concentric hypertrophy and normal systolic function.  · The estimated ejection fraction is 55%.  · Grade III left ventricular diastolic dysfunction.  · Normal right ventricular size with normal right ventricular systolic function.  · Moderate left atrial enlargement.  · Moderate to severe tricuspid regurgitation.  · Mild to moderate pulmonic regurgitation.  · Mild mitral regurgitation.  · Normal central venous pressure (3 mmHg).  · The estimated PA systolic pressure is 56 mmHg.  · There is pulmonary hypertension.  · Moderate to large circumferential pericardial effusion. No evidence of tamponade.  . Continue ACE/ARB and monitor clinical status closely. Monitor on telemetry. Patient is on CHF pathway.  Monitor strict Is&Os and daily weights.  Place on fluid restriction of none. Continue to stress to patient importance " of self efficacy and  on diet for CHF. Last BNP reviewed- and noted below   Recent Labs   Lab 03/02/23  0919   BNP >4,500*   .  Patient will need hemodialysis today  Continue home medication once taking p.o. medications  Strict I&Os/daily weights    Severe diabetic hypoglycemia  Patient's FSGs are uncontrolled due to hypoglycemia on current medication regimen.  Last A1c reviewed-   Lab Results   Component Value Date    HGBA1C 7.5 (H) 12/23/2022     Most recent fingerstick glucose reviewed- No results for input(s): POCTGLUCOSE in the last 24 hours.  Current correctional scale  none  none anti-hyperglycemic dose as follows-   Antihyperglycemics (From admission, onward)    None        History of diabetes type 2 hold home medications  Hourly glucose monitoring  Hypoglycemia protocol  D10 125 mL an hour for now  IM glucagon 1 mg p.r.n. if glucose remains <30  Hold Oral hypoglycemics while patient is in the hospital.    Intussusception intestine  NPO  IV Pain medication prn  Ct abd findings- Consult surgery Dr. Thurman      Anemia of chronic kidney failure, stage 5  Initial hemoglobin 7.7, hematocrit 23.6  No active bleeding  Repeat CBC in a.m.      ESRD (end stage renal disease) on dialysis  Dialysis Tuesday/Thursday/Saturday  Consult nephrology    Seizure disorder  Start IV Keppra 500 mg bid for now; and change to po medication Keppra p.o. 500 BID once able to take by mouth  Seizure precautions        SVT (supraventricular tachycardia)  Home medication Eliquis 5 milligram b.i.d. will hold for now  Continuous cardiac monitoring        VTE Risk Mitigation (From admission, onward)         Ordered     IP VTE HIGH RISK PATIENT  Once         03/02/23 1320     Place sequential compression device  Until discontinued         03/02/23 1320                   DORA Whitfield  Department of Hospital Medicine   [facility]

## 2023-03-02 NOTE — CONSULTS
Nephrology Consult Note        Patient Name: Tabby Howard  MRN: [id number]    Patient Class: IP- Inpatient   Admission Date: 3/2/2023  Length of Stay: 0 days  Date of Service: 3/2/2023    Attending Physician: Maurilio Cruz MD  Primary Care Provider: Primary Doctor No    Reason for Consult: esrd/anemia    SUBJECTIVE:     HPI: 33F with ESRD on HD TTS by Dr. Benitez/Henry/Amber, diabetes, gastroparesis, chronic pain, congestive heart failure, history of SVT, sickle cell trait, hypertensive emergencies, presents emergency department with nausea, vomiting, abdominal pain, diarrhea. Her last dialysis was on Tuesday and she has been reasonably compliant. Since last night she is had multiple episodes of vomiting approximally 10, non-bloody, non-bilious, and about 5 episodes of loose watery stool, accompanied by some lower abdominal pain. She can not keep anything down. Patient has had a cholecystectomy and  in the past.    Past Medical History:   Diagnosis Date    CKD (chronic kidney disease), stage IV 2022    Diabetes mellitus due to underlying condition with unspecified complications 2022    Gastroparesis 2022    Heart failure with preserved ejection fraction 2022    EF 55% on 3/22    History of gastroesophageal reflux (GERD)     History of supraventricular tachycardia     Hyperkalemia 2022    Hypertensive emergency 2022    Sickle cell trait 2022    Type 2 diabetes mellitus      Past Surgical History:   Procedure Laterality Date     SECTION      x 3    COLONOSCOPY      COLONOSCOPY N/A 2022    Procedure: COLONOSCOPY;  Surgeon: Jagdeep Cedeno MD;  Location: [facility];  Service: Endoscopy;  Laterality: N/A;    ESOPHAGOGASTRODUODENOSCOPY N/A 10/18/2019    Procedure: ESOPHAGOGASTRODUODENOSCOPY (EGD);  Surgeon: Gianluca Mendez MD;  Location: [facility];  Service: Endoscopy;  Laterality: N/A;    ESOPHAGOGASTRODUODENOSCOPY N/A 2022    Procedure: EGD  (ESOPHAGOGASTRODUODENOSCOPY);  Surgeon: Micky Paredes III, MD;  Location: [facility] ENDO;  Service: Endoscopy;  Laterality: N/A;    ESOPHAGOGASTRODUODENOSCOPY N/A 12/5/2022    Procedure: EGD (ESOPHAGOGASTRODUODENOSCOPY);  Surgeon: Marcelo Zhong MD;  Location: [facility] ENDO;  Service: Endoscopy;  Laterality: N/A;    LAPAROSCOPIC CHOLECYSTECTOMY N/A 07/30/2022    Procedure: CHOLECYSTECTOMY, LAPAROSCOPIC;  Surgeon: Grey Perez MD;  Location: [facility] OR;  Service: General;  Laterality: N/A;    PLACEMENT OF DUAL-LUMEN VASCULAR CATHETER Left 07/12/2022    Procedure: INSERTION-CATHETER-JOSEPH;  Surgeon: Dionte Gan MD;  Location: [facility] OR;  Service: General;  Laterality: Left;    PLACEMENT OF DUAL-LUMEN VASCULAR CATHETER Right 07/26/2022    Procedure: INSERTION-CATHETER-Hemosplit;  Surgeon: Dionte Gan MD;  Location: [facility] OR;  Service: General;  Laterality: Right;     Family History   Problem Relation Age of Onset    Diabetes Mother     Diabetes Father      Social History     Tobacco Use    Smoking status: Never    Smokeless tobacco: Never   Substance Use Topics    Alcohol use: Not Currently    Drug use: No       Review of patient's allergies indicates:   Allergen Reactions    Penicillins Hives       Outpatient meds:  No current facility-administered medications on file prior to encounter.     Current Outpatient Medications on File Prior to Encounter   Medication Sig Dispense Refill    albuterol (PROVENTIL/VENTOLIN HFA) 90 mcg/actuation inhaler Inhale 2 puffs into the lungs every 6 (six) hours as needed for Wheezing. Rescue 18 g 3    amLODIPine (NORVASC) 10 MG tablet Take 1 tablet (10 mg total) by mouth once daily. 30 tablet 11    amLODIPine (NORVASC) 10 MG tablet Take 1 tablet every day by oral route for 30 days. (Patient taking differently: Take 10 mg by mouth once daily.) 30 tablet 2    apixaban (ELIQUIS) 5 mg Tab Take 5 mg by mouth 2 (two) times daily.      apixaban (ELIQUIS) 5 mg Tab Take 1 tablet (5 mg  total) by mouth 2 (two) times daily. 60 tablet 2    apixaban (ELIQUIS) 5 mg Tab Take 1 tablet twice a day by oral route for 30 days. (Patient taking differently: Take 5 mg by mouth 2 (two) times daily.) 60 tablet 2    BINAXNOW COVID-19 AG SELF TEST Kit Take 1 Dose by mouth once.      carvediloL (COREG) 25 MG tablet Take 1 tablet twice a day by oral route for 30 days. (Patient taking differently: Take 25 mg by mouth 2 (two) times daily.) 60 tablet 2    cloNIDine 0.2 mg/24 hr td ptwk (CATAPRES) 0.2 mg/24 hr Apply 1 patch to skin once every week. (Patient taking differently: Place 1 patch onto the skin once a week.) 4 patch 2    dicyclomine (BENTYL) 10 MG capsule Take 1 capsule (10 mg total) by mouth 3 (three) times daily. 90 capsule 0    dicyclomine (BENTYL) 10 MG capsule Take 1 capsule 3 times a day by oral route for 30 days. (Patient taking differently: Take 10 mg by mouth 3 (three) times daily.) 90 capsule 2    ferrous sulfate 325 (65 FE) MG EC tablet Take 325 mg by mouth once daily.      FLUoxetine 20 MG capsule Take 1 capsule (20 mg total) by mouth once daily. 30 capsule 11    FLUoxetine 20 MG capsule Take 1 capsule every day by oral route for 30 days. (Patient taking differently: Take 20 mg by mouth once daily.) 30 capsule 2    furosemide (LASIX) 40 MG tablet Take 40 mg by mouth 2 (two) times a day.      gabapentin (NEURONTIN) 100 MG capsule Take 1 capsule (100 mg total) by mouth 2 (two) times daily. 90 capsule 2    gabapentin (NEURONTIN) 100 MG capsule Take 1 capsule by mouth 3 times daily (Patient taking differently: Take 100 mg by mouth 3 (three) times daily.) 90 capsule 2    hydrALAZINE (APRESOLINE) 100 MG tablet Take 1 tablet twice a day by oral route for 30 days. (Patient taking differently: Take 100 mg by mouth 2 (two) times daily.) 60 tablet 2    HYDROcodone-acetaminophen (NORCO)  mg per tablet Take 1 tablet by mouth every 4 (four) hours as needed for Pain.      HYDROmorphone (DILAUDID) 4 MG tablet  Take 4 mg by mouth every 12 (twelve) hours as needed for Pain.      insulin aspart U-100 (NOVOLOG FLEXPEN U-100 INSULIN) 100 unit/mL (3 mL) InPn pen Inject 10 units under the skin 3 times a day before meals. (Patient taking differently: Inject 10 Units into the skin 3 (three) times daily with meals.) 15 mL 5    insulin aspart U-100 (NOVOLOG) 100 unit/mL (3 mL) InPn pen Inject 1-10 Units into the skin before meals and at bedtime as needed (Hyperglycemia). Max daily dose 40 units. 3 mL 6    insulin detemir U-100 (LEVEMIR FLEXTOUCH U-100 INSULN) 100 unit/mL (3 mL) InPn pen Inject 18 units every day by subcutaneous route in the evening. (Patient taking differently: Inject 18 Units into the skin every evening.) 15 mL 5    insulin glargine 100 units/mL SubQ pen Inject 16 Units into the skin every evening.      isosorbide mononitrate (IMDUR) 60 MG 24 hr tablet Take 2 tablets (120 mg total) by mouth once daily. 30 tablet 11    isosorbide mononitrate (IMDUR) 60 MG 24 hr tablet Take 1 tablet every day by oral route for 30 days. (Patient taking differently: Take 60 mg by mouth once daily.) 30 tablet 2    levETIRAcetam (KEPPRA) 500 MG Tab Take 1 tablet (500 mg total) by mouth 2 (two) times daily. 60 tablet 11    levETIRAcetam (KEPPRA) 500 MG Tab Take 1 tablet twice a day by oral route for 30 days. (Patient taking differently: Take 500 mg by mouth once daily.) 60 tablet 2    losartan-hydrochlorothiazide 100-12.5 mg (HYZAAR) 100-12.5 mg Tab Take 1 tablet by mouth once daily.      mirtazapine (REMERON SOL-TAB) 15 MG disintegrating tablet Take 1 tablet (15 mg total) by mouth nightly. 90 tablet 0    ondansetron (ZOFRAN) 4 MG tablet Take 1 tablet (4 mg total) by mouth every 8 (eight) hours as needed for Nausea. 60 tablet 2    ondansetron (ZOFRAN-ODT) 4 MG TbDL Place1 tablet on the tongue every 8 hours as needed (Patient taking differently: Take 4 mg by mouth every 6 (six) hours as needed.) 24 tablet 0    pantoprazole (PROTONIX) 40 MG  "tablet Take 40 mg by mouth once daily.      pantoprazole (PROTONIX) 40 MG tablet Take 1 tablet every day by oral route for 30 days. (Patient taking differently: Take 40 mg by mouth once daily.) 30 tablet 2    pen needle, diabetic (BD ULTRA-FINE MAGALIE PEN NEEDLE) 32 gauge x 5/32" Ndle Inject into the skin 5 times per day with insulin 100 each 12    promethazine (PHENERGAN) 25 MG suppository Place 1 suppository (25 mg total) rectally every 6 (six) hours as needed for Nausea. 10 suppository 0    torsemide (DEMADEX) 100 MG Tab Take 100 mg by mouth once daily.      [DISCONTINUED] atenoloL (TENORMIN) 50 MG tablet Take 1 tablet (50 mg total) by mouth every other day. 45 tablet 3    [DISCONTINUED] omeprazole (PRILOSEC) 20 MG capsule Take 2 capsules (40 mg total) by mouth once daily. for 10 days 20 capsule 0       Scheduled meds:   dextrose 50%        sodium chloride 0.9%  30 mL/kg Intravenous Once    vancomycin (VANCOCIN) IVPB  1,000 mg Intravenous ED 1 Time       Infusions:   NORepinephrine bitartrate-D5W         PRN meds:  acetaminophen, acetaminophen, bisacodyL, dextrose 10%, dextrose 10%, dextrose 10%, dextrose 10%, dextrose 10%, dextrose 10%, dextrose 10%, dextrose 10%, dextrose 50%, dextrose 50%, morphine, ondansetron, Pharmacy to dose Vancomycin consult **AND** vancomycin - pharmacy to dose    Review of Systems:  Constitutional:  Negative for chills, fever, malaise/fatigue and weight loss.   HENT:  Negative for hearing loss and nosebleeds.    Eyes:  Negative for blurred vision, double vision and photophobia.   Respiratory:  Negative for cough, shortness of breath and wheezing.    Cardiovascular:  Negative for chest pain, palpitations and leg swelling.   Gastrointestinal:  POSITIVE for abdominal pain, constipation, diarrhea, heartburn, nausea and vomiting.   Genitourinary:  Negative for dysuria, frequency and urgency.   Musculoskeletal:  Negative for falls, joint pain and myalgias.   Skin:  Negative for itching and " rash.   Neurological:  Negative for dizziness, speech change, focal weakness, loss of consciousness and headaches.   Endo/Heme/Allergies:  Does not bruise/bleed easily.   Psychiatric/Behavioral:  Negative for depression and substance abuse. The patient is not nervous/anxious.      OBJECTIVE:     Vital Signs and IO:  Temp:  [97.6 °F (36.4 °C)]   Pulse:  [74-94]   Resp:  [19-20]   BP: (156-197)/()   SpO2:  [83 %-99 %]   No intake/output data recorded.  Wt Readings from Last 5 Encounters:   03/02/23 59.9 kg (132 lb)   02/08/23 59.9 kg (132 lb)   02/06/23 59.9 kg (132 lb)   02/05/23 59.9 kg (132 lb)   01/26/23 43.9 kg (96 lb 12.5 oz)     Body mass index is 24.14 kg/m².    Physical Exam  Constitutional:       General: Patient is not in acute distress.     Appearance: Patient is well-developed. She is not diaphoretic.   HENT:      Head: Normocephalic and atraumatic.      Mouth/Throat: Mucous membranes are moist.   Eyes:      General: No scleral icterus.     Pupils: Pupils are equal, round, and reactive to light.   Cardiovascular:      Rate and Rhythm: Normal rate and regular rhythm.   Pulmonary:      Effort: Pulmonary effort is normal. No respiratory distress.      Breath sounds: No stridor.   Abdominal:      General: There is no distension.      Palpations: Abdomen is soft.   Musculoskeletal:         General: No deformity. Normal range of motion.      Cervical back: Neck supple.   Skin:     General: Skin is warm and dry.      Findings: No rash present. No erythema.   Neurological:      Mental Status: Patient is alert and oriented to person, place, and time.      Cranial Nerves: No cranial nerve deficit.   Psychiatric:         Behavior: Behavior normal.     Laboratory:  Recent Labs   Lab 03/02/23  0919      K 3.7   CL 98   CO2 26   BUN 35*   CREATININE 5.0*   *       Recent Labs   Lab 03/02/23  0919   CALCIUM 8.6*   ALBUMIN 3.3*   MG 2.0       Recent Labs   Lab 07/08/22  0516   PTH, Intact 289.8 H        No results for input(s): POCTGLUCOSE in the last 168 hours.    Recent Labs   Lab 07/22/22  1653 08/24/22  0218 12/23/22  1413   Hemoglobin A1C 6.0 H 6.2 7.5 H       Recent Labs   Lab 03/02/23  0919   WBC 8.00   HGB 7.7*   HCT 23.6*   *   MCV 90   MCHC 32.6   MONO 4.3  0.3       Recent Labs   Lab 03/02/23  0919   BILITOT 3.5*   PROT 7.7   ALBUMIN 3.3*   ALKPHOS 415*   *   AST 92*       Recent Labs   Lab 10/24/22  0107 11/19/22  1210 02/05/23  0156   Color, UA Yellow Yellow Yellow   Appearance, UA Clear Clear Clear   pH, UA 8.0 8.0 >8.0 A   Specific [place], UA 1.020 1.025 1.020   Protein, UA 4+ 4+ 3+ A   Glucose, UA 4+ A 4+ A 2+ A   Ketones, UA Negative Negative 1+ A   Urobilinogen, UA Negative Negative Negative   Bilirubin (UA) Negative 1+ A 2+ A   Occult Blood UA 2+ A 2+ A 3+ A   Nitrite, UA Negative Negative Negative   RBC, UA 41 H 38 H 15 H   WBC, UA 24 H 25 H 1   Bacteria Negative Negative Rare   Hyaline Casts, UA 6 A 2 A 2 A       Recent Labs   Lab 01/05/23  1100 02/07/23  0855 03/02/23  1225   POC PH 7.427 7.397 7.377   POC PCO2 44.8 41.6 54.8 H   POC HCO3 29.5 H 25.6 32.2 H   POC PO2 49 89 113 H   POC SATURATED O2 85 L 97 98   POC BE 5 1 7   Sample VENOUS ARTERIAL ARTERIAL       Microbiology Results (last 7 days)       Procedure Component Value Units Date/Time    Culture, Respiratory with Gram Stain [[id number]]     Order Status: No result Specimen: Respiratory from Sputum, Expectorated     Blood culture #2 **CANNOT BE ORDERED STAT** [[id number]] Collected: 03/02/23 1030    Order Status: Sent Specimen: Blood from Peripheral, Antecubital, Left Updated: 03/02/23 1044    Blood culture #1 **CANNOT BE ORDERED STAT** [[id number]] Collected: 03/02/23 1015    Order Status: Sent Specimen: Blood from Peripheral, Antecubital, Right Updated: 03/02/23 1044            ASSESSMENT/PLAN:     ESRD on HD TTS via AVF  Next HD PRN, then per schedule.  Continue current dialysis prescription.  Renal diet - low K,  low phos.  No IVs or BP checks on access and/or non-dominant arm.    Anemia of CKD  Hgb and HCT are acceptable. Monitor for now.  Will provide SHELLEY and/or IV iron PRN.  Consider transfusion in AM.    MBD / Secondary HPT  Ca, Phos, PTH and vitamin D levels are acceptable.   Phos binders, vitamin D and analogues, calcimimetics will be given as needed.    HTN  BP seem controlled.   Tolerate asymptomatic HTN up to -160.  Continue home meds.  Low sodium diet.    Abdominal pain 2/2 intussusception  Bilateral PNA  Management per primary team    Thank you for allowing us to participate in the care of your patient!   We will follow the patient and provide recommendations as needed.    Patient care time was spent personally by me on the following activities:     Obtaining a history.  Examination of patient.  Providing medical care at the patients bedside.  Developing a treatment plan with patient or surrogate and bedside caregivers.  Ordering and reviewing laboratory studies, radiographic studies, pulse oximetry.  Ordering and performing treatments and interventions.  Evaluation of patient's response to treatment.  Discussions with consultants while on the unit and immediately available to the patient.  Re-evaluation of the patient's condition.  Documentation in the medical record.     Mando Benitez MD    [facility]  [address] [place] [address], [place] [id number] [phone] - tel  [phone] - fax    3/2/2023

## 2023-03-02 NOTE — NURSING
Patient arrived from ED via stretcher. Patient arouses to voice, oriented to self and place then appeared to fall asleep during exam. Glucose 97 at this time. Patient is on 4L oxymask. Receiving vancomycin and D10 infusions. Gauze dressing to dialysis catheter noted to be falling off; sterile dressing change performed. Temp noted to be 93 axillary--warming blanket applied. Notifed Anthony SPRINGER of patient arrival who clarified that Tyler SPRINGER is the designated hospitalist for this patient. Notified Tyler SPRINGER of patient arrival shortly after.

## 2023-03-02 NOTE — CONSULTS
GENERAL SURGERY  INPATIENT CONSULT    REASON FOR CONSULT: Intussusception    HPI: Tabby Howard is a 33 y.o. female with extensive medical history including end-stage renal disease on hemodialysis through a right IJ tunneled catheter on hemodialysis TTS, hypertension, CHF, type 2 diabetes, gastroparesis, history of SVT, sickle cell trait history of seizure disorder who presented the emergency room with nausea, multiple episodes of vomiting, mild abdominal pain and diarrhea. To be hypoglycemic requiring dextrose infusions.  Patient was also drowsy somnolent. CT scan was performed which showed a small segment intussusception proximal small bowel but no evidence of obstruction. Also with multifocal bilateral pneumonia. General surgery has been consulted for evaluation intussusception. Patient currently admitted to the ICU She is arousable but very somnolent and can not really provide any significant history.  She is not complaining of abdominal this time.     I have reviewed the patient's chart including prior progress notes, procedures and testing.     ROS:   Review of Systems   Unable to perform ROS: Mental status change     PROBLEM LIST:  Patient Active Problem List   Diagnosis    SVT (supraventricular tachycardia)    Gastritis    Gallstones    Homelessness    Gastroparesis - suspected, unconfirmed    Sickle cell trait    Seizure disorder    Hydroureter on left    Nephrotic range proteinuria    Pericardial effusion    ESRD (end stage renal disease) on dialysis    Deep vein thrombosis (DVT) of non-extremity vein    Uncontrolled hypertension    Malnutrition of moderate degree    Thrombocytopenia    Vision loss, right eye    Anemia of chronic kidney failure, stage 5    Stable proliferative diabetic retinopathy of both eyes associated with type 2 diabetes mellitus    Vitreous hemorrhage, both eyes    Cortical cataract of both eyes    Intractable generalized abdominal pain    Adjustment disorder    Drug-seeking behavior     DM (diabetes mellitus)    Demand ischemia    Intussusception intestine    Severe sepsis    Severe diabetic hypoglycemia    Congestive heart failure with left ventricular diastolic dysfunction         HISTORY  Past Medical History:   Diagnosis Date    CKD (chronic kidney disease), stage IV 2022    Diabetes mellitus due to underlying condition with unspecified complications 2022    Gastroparesis 2022    Heart failure with preserved ejection fraction 2022    EF 55% on 3/22    History of gastroesophageal reflux (GERD)     History of supraventricular tachycardia     Hyperkalemia 2022    Hypertensive emergency 2022    Sickle cell trait 2022    Type 2 diabetes mellitus        Past Surgical History:   Procedure Laterality Date     SECTION      x 3    COLONOSCOPY      COLONOSCOPY N/A 2022    Procedure: COLONOSCOPY;  Surgeon: Jagdeep Cedeno MD;  Location: [facility];  Service: Endoscopy;  Laterality: N/A;    ESOPHAGOGASTRODUODENOSCOPY N/A 10/18/2019    Procedure: ESOPHAGOGASTRODUODENOSCOPY (EGD);  Surgeon: Gianluca Mendez MD;  Location: [facility];  Service: Endoscopy;  Laterality: N/A;    ESOPHAGOGASTRODUODENOSCOPY N/A 2022    Procedure: EGD (ESOPHAGOGASTRODUODENOSCOPY);  Surgeon: Micky Paredes III, MD;  Location: [facility];  Service: Endoscopy;  Laterality: N/A;    ESOPHAGOGASTRODUODENOSCOPY N/A 2022    Procedure: EGD (ESOPHAGOGASTRODUODENOSCOPY);  Surgeon: Marcelo Zhong MD;  Location: [facility];  Service: Endoscopy;  Laterality: N/A;    LAPAROSCOPIC CHOLECYSTECTOMY N/A 2022    Procedure: CHOLECYSTECTOMY, LAPAROSCOPIC;  Surgeon: Grey Perez MD;  Location: [facility];  Service: General;  Laterality: N/A;    PLACEMENT OF DUAL-LUMEN VASCULAR CATHETER Left 2022    Procedure: INSERTION-CATHETER-JOSEPH;  Surgeon: Dionte Gan MD;  Location: [facility] OR;  Service: General;  Laterality: Left;    PLACEMENT OF DUAL-LUMEN VASCULAR CATHETER Right  07/26/2022    Procedure: INSERTION-CATHETER-Hemosplit;  Surgeon: Dionte Gan MD;  Location: [facility] OR;  Service: General;  Laterality: Right;       Social History     Tobacco Use    Smoking status: Never    Smokeless tobacco: Never   Substance Use Topics    Alcohol use: Not Currently    Drug use: No       Family History   Problem Relation Age of Onset    Diabetes Mother     Diabetes Father          MEDS:  No current facility-administered medications on file prior to encounter.     Current Outpatient Medications on File Prior to Encounter   Medication Sig Dispense Refill    albuterol (PROVENTIL/VENTOLIN HFA) 90 mcg/actuation inhaler Inhale 2 puffs into the lungs every 6 (six) hours as needed for Wheezing. Rescue 18 g 3    amLODIPine (NORVASC) 10 MG tablet Take 1 tablet (10 mg total) by mouth once daily. 30 tablet 11    amLODIPine (NORVASC) 10 MG tablet Take 1 tablet every day by oral route for 30 days. (Patient taking differently: Take 10 mg by mouth once daily.) 30 tablet 2    apixaban (ELIQUIS) 5 mg Tab Take 5 mg by mouth 2 (two) times daily.      apixaban (ELIQUIS) 5 mg Tab Take 1 tablet (5 mg total) by mouth 2 (two) times daily. 60 tablet 2    apixaban (ELIQUIS) 5 mg Tab Take 1 tablet twice a day by oral route for 30 days. (Patient taking differently: Take 5 mg by mouth 2 (two) times daily.) 60 tablet 2    BINAXNOW COVID-19 AG SELF TEST Kit Take 1 Dose by mouth once.      carvediloL (COREG) 25 MG tablet Take 1 tablet twice a day by oral route for 30 days. (Patient taking differently: Take 25 mg by mouth 2 (two) times daily.) 60 tablet 2    cloNIDine 0.2 mg/24 hr td ptwk (CATAPRES) 0.2 mg/24 hr Apply 1 patch to skin once every week. (Patient taking differently: Place 1 patch onto the skin once a week.) 4 patch 2    dicyclomine (BENTYL) 10 MG capsule Take 1 capsule (10 mg total) by mouth 3 (three) times daily. 90 capsule 0    dicyclomine (BENTYL) 10 MG capsule Take 1 capsule 3 times a day by oral route for 30  days. (Patient taking differently: Take 10 mg by mouth 3 (three) times daily.) 90 capsule 2    ferrous sulfate 325 (65 FE) MG EC tablet Take 325 mg by mouth once daily.      FLUoxetine 20 MG capsule Take 1 capsule (20 mg total) by mouth once daily. 30 capsule 11    FLUoxetine 20 MG capsule Take 1 capsule every day by oral route for 30 days. (Patient taking differently: Take 20 mg by mouth once daily.) 30 capsule 2    furosemide (LASIX) 40 MG tablet Take 40 mg by mouth 2 (two) times a day.      gabapentin (NEURONTIN) 100 MG capsule Take 1 capsule (100 mg total) by mouth 2 (two) times daily. 90 capsule 2    gabapentin (NEURONTIN) 100 MG capsule Take 1 capsule by mouth 3 times daily (Patient taking differently: Take 100 mg by mouth 3 (three) times daily.) 90 capsule 2    hydrALAZINE (APRESOLINE) 100 MG tablet Take 1 tablet twice a day by oral route for 30 days. (Patient taking differently: Take 100 mg by mouth 2 (two) times daily.) 60 tablet 2    HYDROcodone-acetaminophen (NORCO)  mg per tablet Take 1 tablet by mouth every 4 (four) hours as needed for Pain.      HYDROmorphone (DILAUDID) 4 MG tablet Take 4 mg by mouth every 12 (twelve) hours as needed for Pain.      insulin aspart U-100 (NOVOLOG FLEXPEN U-100 INSULIN) 100 unit/mL (3 mL) InPn pen Inject 10 units under the skin 3 times a day before meals. (Patient taking differently: Inject 10 Units into the skin 3 (three) times daily with meals.) 15 mL 5    insulin aspart U-100 (NOVOLOG) 100 unit/mL (3 mL) InPn pen Inject 1-10 Units into the skin before meals and at bedtime as needed (Hyperglycemia). Max daily dose 40 units. 3 mL 6    insulin detemir U-100 (LEVEMIR FLEXTOUCH U-100 INSULN) 100 unit/mL (3 mL) InPn pen Inject 18 units every day by subcutaneous route in the evening. (Patient taking differently: Inject 18 Units into the skin every evening.) 15 mL 5    insulin glargine 100 units/mL SubQ pen Inject 16 Units into the skin every evening.      isosorbide  "mononitrate (IMDUR) 60 MG 24 hr tablet Take 2 tablets (120 mg total) by mouth once daily. 30 tablet 11    isosorbide mononitrate (IMDUR) 60 MG 24 hr tablet Take 1 tablet every day by oral route for 30 days. (Patient taking differently: Take 60 mg by mouth once daily.) 30 tablet 2    levETIRAcetam (KEPPRA) 500 MG Tab Take 1 tablet (500 mg total) by mouth 2 (two) times daily. 60 tablet 11    levETIRAcetam (KEPPRA) 500 MG Tab Take 1 tablet twice a day by oral route for 30 days. (Patient taking differently: Take 500 mg by mouth once daily.) 60 tablet 2    losartan-hydrochlorothiazide 100-12.5 mg (HYZAAR) 100-12.5 mg Tab Take 1 tablet by mouth once daily.      mirtazapine (REMERON SOL-TAB) 15 MG disintegrating tablet Take 1 tablet (15 mg total) by mouth nightly. 90 tablet 0    ondansetron (ZOFRAN) 4 MG tablet Take 1 tablet (4 mg total) by mouth every 8 (eight) hours as needed for Nausea. 60 tablet 2    ondansetron (ZOFRAN-ODT) 4 MG TbDL Place1 tablet on the tongue every 8 hours as needed (Patient taking differently: Take 4 mg by mouth every 6 (six) hours as needed.) 24 tablet 0    pantoprazole (PROTONIX) 40 MG tablet Take 40 mg by mouth once daily.      pantoprazole (PROTONIX) 40 MG tablet Take 1 tablet every day by oral route for 30 days. (Patient taking differently: Take 40 mg by mouth once daily.) 30 tablet 2    pen needle, diabetic (BD ULTRA-FINE MAGALIE PEN NEEDLE) 32 gauge x 5/32" Ndle Inject into the skin 5 times per day with insulin 100 each 12    promethazine (PHENERGAN) 25 MG suppository Place 1 suppository (25 mg total) rectally every 6 (six) hours as needed for Nausea. 10 suppository 0    torsemide (DEMADEX) 100 MG Tab Take 100 mg by mouth once daily.      [DISCONTINUED] atenoloL (TENORMIN) 50 MG tablet Take 1 tablet (50 mg total) by mouth every other day. 45 tablet 3    [DISCONTINUED] omeprazole (PRILOSEC) 20 MG capsule Take 2 capsules (40 mg total) by mouth once daily. for 10 days 20 capsule 0 "       ALLERGIES:  Review of patient's allergies indicates:   Allergen Reactions    Penicillins Hives         VITALS:  Temp:  [93 °F (33.9 °C)-97.6 °F (36.4 °C)] 97 °F (36.1 °C)  Pulse:  [68-94] 73  Resp:  [14-20] 16  SpO2:  [83 %-100 %] 100 %  BP: (156-197)/() 172/102    No intake/output data recorded.      PHYSICAL EXAM:  Physical Exam  Vitals reviewed.   Constitutional:       General: She is not in acute distress.     Appearance: Normal appearance. She is well-developed.   HENT:      Head: Normocephalic and atraumatic.      Nose: Nose normal.   Eyes:      General: No scleral icterus.     Conjunctiva/sclera: Conjunctivae normal.   Neck:      Trachea: No tracheal tenderness or tracheal deviation.      Comments: Tunneled right IJ catheter in place, no signs of infection  Cardiovascular:      Rate and Rhythm: Normal rate and regular rhythm.      Pulses: Normal pulses.   Pulmonary:      Effort: Pulmonary effort is normal. No accessory muscle usage or respiratory distress.      Breath sounds: Normal breath sounds.   Abdominal:      General: There is distension (Mild distention).      Palpations: Abdomen is soft.      Tenderness: There is no abdominal tenderness (no significant tenderness, guarding or rebound).   Musculoskeletal:         General: No swelling or tenderness. Normal range of motion.      Cervical back: Normal range of motion and neck supple. No rigidity.   Skin:     General: Skin is warm and dry.      Coloration: Skin is not jaundiced.      Findings: No erythema.   Neurological:      General: No focal deficit present.      Mental Status: She is alert and oriented to person, place, and time.      Motor: No weakness or abnormal muscle tone.   Psychiatric:         Mood and Affect: Mood normal.         Behavior: Behavior normal.         Thought Content: Thought content normal.         Judgment: Judgment normal.         LABS:  Lab Results   Component Value Date    WBC 8.00 03/02/2023    RBC 2.61 (L)  03/02/2023    HGB 7.7 (L) 03/02/2023    HCT 25 (L) 03/02/2023     (L) 03/02/2023     Lab Results   Component Value Date     (H) 03/02/2023     03/02/2023    K 3.7 03/02/2023    CL 98 03/02/2023    CO2 26 03/02/2023    BUN 35 (H) 03/02/2023    CREATININE 5.0 (H) 03/02/2023    CALCIUM 8.6 (L) 03/02/2023     Lab Results   Component Value Date     (H) 03/02/2023    AST 92 (H) 03/02/2023    ALKPHOS 415 (H) 03/02/2023    BILITOT 3.5 (H) 03/02/2023     Lab Results   Component Value Date    MG 2.0 03/02/2023    PHOS 3.8 02/08/2023       STUDIES:  Images and reports were personally reviewed.    CT A/P  FINDINGS:     Please refer to same day CTA chest for evaluation of the thoracic viscera.     Liver is normal size. Elongated left hepatic lobe noted. No enhancing liver lesions identified. The gallbladder has been removed. The pancreas, spleen and adrenal glands are unremarkable. The kidneys are normal size. No hydronephrosis or nephrolithiasis. The visualization of the ureters. The bladder is fluid-filled with no focal wall abnormalities. The uterus is unremarkable. The adnexal structures are grossly unremarkable. There is mild free fluid in the pelvic cul-de-sac.     Large and small bowel are normal caliber. There is no bowel wall thickening or inflammatory changes. There is a focus of intussusception involving the proximal jejunum (series 4 image 134 and series 6 image 28). Small bowel is normal caliber with no sign of obstruction demonstrated. Stomach is mildly fluid filled and grossly unremarkable. The appendix is normal.     The abdominal aorta is normal caliber. There is no intra-abdominal lymphadenopathy. No mesenteric fat stranding. There is trace free fluid in the pelvis and left pericolic gutter.     The abdominal aorta is normal caliber. Atherosclerosis of the intra-abdominal arteries observed. There is anasarca of the ventral abdominal wall subcutaneous tissues.     No acute osseous  abnormality.     IMPRESSION:     1.  Small focus of intussusception involving the proximal jejunum without evidence of obstruction. This most likely reflects transient intussusception. Clinical correlation recommended.  2.  Mild free fluid in the pelvic cul-de-sac and left pericolic gutter of undetermined significance.  3.  Additional incidental observations as described.      ASSESSMENT & PLAN:  33 y.o. female with multifocal pneumonia, hypoglycemia, intussusception, end-stage renal disease  -no evidence of obstruction, intussusception seen on imaging may be transitory  -no immediate surgical intervention recommended  -if any concern for persistent intussusception would recommend upper GI series with small-bowel follow-through  -has a diagnosis of gastroparesis in the chart, may be contributing factor?, no gastric emptying study has been performed and could be considered as an outpatient  -if nausea vomiting approved patient may be given a trial of clear liquid diet to assess for tolerance, was tolerating and having bowel function may be advanced to diabetic diet  -otherwise fluid resuscitate as needed, antibiotics for pneumonia, close monitoring of blood sugars, dialysis per Nephrology

## 2023-03-02 NOTE — ASSESSMENT & PLAN NOTE
Patient is identified as having Diastolic (HFpEF) heart failure that is Chronic. CHF is currently controlled. Latest ECHO performed and demonstrates- Results for orders placed during the hospital encounter of 01/12/23    Echo    Interpretation Summary  · The left ventricle is normal in size with moderate concentric hypertrophy and normal systolic function.  · The estimated ejection fraction is 55%.  · Grade III left ventricular diastolic dysfunction.  · Normal right ventricular size with normal right ventricular systolic function.  · Moderate left atrial enlargement.  · Moderate to severe tricuspid regurgitation.  · Mild to moderate pulmonic regurgitation.  · Mild mitral regurgitation.  · Normal central venous pressure (3 mmHg).  · The estimated PA systolic pressure is 56 mmHg.  · There is pulmonary hypertension.  · Moderate to large circumferential pericardial effusion. No evidence of tamponade.  . Continue ACE/ARB and monitor clinical status closely. Monitor on telemetry. Patient is on CHF pathway.  Monitor strict Is&Os and daily weights.  Place on fluid restriction of none. Continue to stress to patient importance of self efficacy and  on diet for CHF. Last BNP reviewed- and noted below   Recent Labs   Lab 03/02/23  0919   BNP >4,500*   .  Patient will need hemodialysis today  Continue home medication once taking p.o. medications  Strict I&Os/daily weights

## 2023-03-02 NOTE — PROGRESS NOTES
3/2/2023 Pharmacokinetic Assessment: IV Vancomycin  Vanco DAY 1 - Tabby Howard is a 33 y.o. female being treated for lower respiratory infection. Goal 10-15 mcg/mL.     Dialysis Method (if applicable):N/A     Vancomycin serum concentration assessment(s) (last 3 results):  No results for input(s): VANCOMYCINRA, VANCOMYCINPE, VANCOMYCINTR, VANCOTROUGH in the last 72 hours.    Vancomycin Regimen Plan: 1000 mg   once . Random before 2nd dose on 3/3 @ 12:00.    Day of Thx Date Current Weight (kg) Laboratory  Doses    Vancomycin Level      Time SCr CrCl Scheduled Time Time Dose Dosage (mcg/ml) Peak,  Random,  Trough?         VANCOMYCIN 1000mg x1      1 3/2 59.9  5.0 12.5 - 12:58 1 1000     2 3/3     12:00  - -  Trough     Rationale for Plan: per protocol (calculations copied from [employer])    Labs:  Estimated Creatinine Clearance: 12.5 mL/min (A) (based on SCr of 5 mg/dL (H)).  Recent Labs   Lab Result Units 03/02/23  0919   WBC K/uL 8.00   Creatinine mg/dL 5.0*       Cxs:   Microbiology Results (last 7 days)       Procedure Component Value Units Date/Time    Culture, Respiratory with Gram Stain [[id number]]     Order Status: No result Specimen: Respiratory from Sputum, Expectorated     Blood culture #2 **CANNOT BE ORDERED STAT** [[id number]] Collected: 03/02/23 1030    Order Status: Sent Specimen: Blood from Peripheral, Antecubital, Left Updated: 03/02/23 1044    Blood culture #1 **CANNOT BE ORDERED STAT** [[id number]] Collected: 03/02/23 1015    Order Status: Sent Specimen: Blood from Peripheral, Antecubital, Right Updated: 03/02/23 1044            Pharmacy will continue to follow and monitor vancomycin.   Please contact pharmacy at extension [phone] with any questions regarding this assessment.     Thank you for the consult,   Chaim Patterson

## 2023-03-02 NOTE — ASSESSMENT & PLAN NOTE
Patient's FSGs are uncontrolled due to hypoglycemia on current medication regimen.  Last A1c reviewed-   Lab Results   Component Value Date    HGBA1C 7.5 (H) 12/23/2022     Most recent fingerstick glucose reviewed- No results for input(s): POCTGLUCOSE in the last 24 hours.  Current correctional scale  none  none anti-hyperglycemic dose as follows-   Antihyperglycemics (From admission, onward)    None        History of diabetes type 2 hold home medications  Hourly glucose monitoring  Hypoglycemia protocol  D10 125 mL an hour for now  IM glucagon 1 mg p.r.n. if glucose remains <30  Hold Oral hypoglycemics while patient is in the hospital.

## 2023-03-03 LAB
ALBUMIN SERPL BCP-MCNC: 2.6 G/DL (ref 3.5–5.2)
ALP SERPL-CCNC: 338 U/L (ref 55–135)
ALT SERPL W/O P-5'-P-CCNC: 132 U/L (ref 10–44)
ANION GAP SERPL CALC-SCNC: 12 MMOL/L (ref 8–16)
ANION GAP SERPL CALC-SCNC: 13 MMOL/L (ref 8–16)
AST SERPL-CCNC: 60 U/L (ref 10–40)
BASOPHILS # BLD AUTO: 0.01 K/UL (ref 0–0.2)
BASOPHILS NFR BLD: 0.2 % (ref 0–1.9)
BILIRUB SERPL-MCNC: 2.2 MG/DL (ref 0.1–1)
BUN SERPL-MCNC: 41 MG/DL (ref 6–20)
BUN SERPL-MCNC: 42 MG/DL (ref 6–20)
CALCIUM SERPL-MCNC: 6.9 MG/DL (ref 8.7–10.5)
CALCIUM SERPL-MCNC: 7.5 MG/DL (ref 8.7–10.5)
CHLORIDE SERPL-SCNC: 96 MMOL/L (ref 95–110)
CHLORIDE SERPL-SCNC: 96 MMOL/L (ref 95–110)
CO2 SERPL-SCNC: 28 MMOL/L (ref 23–29)
CO2 SERPL-SCNC: 28 MMOL/L (ref 23–29)
CREAT SERPL-MCNC: 5.6 MG/DL (ref 0.5–1.4)
CREAT SERPL-MCNC: 5.8 MG/DL (ref 0.5–1.4)
DIFFERENTIAL METHOD: ABNORMAL
EOSINOPHIL # BLD AUTO: 0.1 K/UL (ref 0–0.5)
EOSINOPHIL NFR BLD: 1 % (ref 0–8)
ERYTHROCYTE [DISTWIDTH] IN BLOOD BY AUTOMATED COUNT: 19.8 % (ref 11.5–14.5)
EST. GFR  (NO RACE VARIABLE): 9.2 ML/MIN/1.73 M^2
EST. GFR  (NO RACE VARIABLE): 9.6 ML/MIN/1.73 M^2
ESTIMATED AVG GLUCOSE: 120 MG/DL (ref 68–131)
GLUCOSE SERPL-MCNC: 147 MG/DL (ref 70–110)
GLUCOSE SERPL-MCNC: 156 MG/DL (ref 70–110)
GLUCOSE SERPL-MCNC: 172 MG/DL (ref 70–110)
GLUCOSE SERPL-MCNC: 188 MG/DL (ref 70–110)
GLUCOSE SERPL-MCNC: 199 MG/DL (ref 70–110)
GLUCOSE SERPL-MCNC: 204 MG/DL (ref 70–110)
GLUCOSE SERPL-MCNC: 216 MG/DL (ref 70–110)
GLUCOSE SERPL-MCNC: 226 MG/DL (ref 70–110)
GLUCOSE SERPL-MCNC: 230 MG/DL (ref 70–110)
GLUCOSE SERPL-MCNC: 231 MG/DL (ref 70–110)
HBA1C MFR BLD: 5.8 % (ref 4.5–6.2)
HCT VFR BLD AUTO: 23.4 % (ref 37–48.5)
HGB BLD-MCNC: 7.6 G/DL (ref 12–16)
IMM GRANULOCYTES # BLD AUTO: 0.04 K/UL (ref 0–0.04)
IMM GRANULOCYTES NFR BLD AUTO: 0.7 % (ref 0–0.5)
LYMPHOCYTES # BLD AUTO: 0.8 K/UL (ref 1–4.8)
LYMPHOCYTES NFR BLD: 13.9 % (ref 18–48)
MCH RBC QN AUTO: 28.8 PG (ref 27–31)
MCHC RBC AUTO-ENTMCNC: 32.5 G/DL (ref 32–36)
MCV RBC AUTO: 89 FL (ref 82–98)
MONOCYTES # BLD AUTO: 0.5 K/UL (ref 0.3–1)
MONOCYTES NFR BLD: 8.3 % (ref 4–15)
NEUTROPHILS # BLD AUTO: 4.4 K/UL (ref 1.8–7.7)
NEUTROPHILS NFR BLD: 75.9 % (ref 38–73)
NRBC BLD-RTO: 0 /100 WBC
PLATELET # BLD AUTO: 151 K/UL (ref 150–450)
PMV BLD AUTO: 9.9 FL (ref 9.2–12.9)
POTASSIUM SERPL-SCNC: 3.9 MMOL/L (ref 3.5–5.1)
POTASSIUM SERPL-SCNC: 4 MMOL/L (ref 3.5–5.1)
PROT SERPL-MCNC: 5.8 G/DL (ref 6–8.4)
RBC # BLD AUTO: 2.64 M/UL (ref 4–5.4)
SODIUM SERPL-SCNC: 136 MMOL/L (ref 136–145)
SODIUM SERPL-SCNC: 137 MMOL/L (ref 136–145)
VANCOMYCIN TROUGH SERPL-MCNC: 20.4 UG/ML
WBC # BLD AUTO: 5.81 K/UL (ref 3.9–12.7)

## 2023-03-03 PROCEDURE — 25000003 PHARM REV CODE 250

## 2023-03-03 PROCEDURE — 82962 GLUCOSE BLOOD TEST: CPT

## 2023-03-03 PROCEDURE — 63600175 PHARM REV CODE 636 W HCPCS: Performed by: INTERNAL MEDICINE

## 2023-03-03 PROCEDURE — 99233 PR SUBSEQUENT HOSPITAL CARE,LEVL III: ICD-10-PCS | Mod: ,,, | Performed by: INTERNAL MEDICINE

## 2023-03-03 PROCEDURE — 25000003 PHARM REV CODE 250: Performed by: INTERNAL MEDICINE

## 2023-03-03 PROCEDURE — 80202 ASSAY OF VANCOMYCIN: CPT | Performed by: INTERNAL MEDICINE

## 2023-03-03 PROCEDURE — 27000221 HC OXYGEN, UP TO 24 HOURS

## 2023-03-03 PROCEDURE — 94761 N-INVAS EAR/PLS OXIMETRY MLT: CPT

## 2023-03-03 PROCEDURE — 80053 COMPREHEN METABOLIC PANEL: CPT | Performed by: NURSE PRACTITIONER

## 2023-03-03 PROCEDURE — 36415 COLL VENOUS BLD VENIPUNCTURE: CPT | Performed by: INTERNAL MEDICINE

## 2023-03-03 PROCEDURE — 85025 COMPLETE CBC W/AUTO DIFF WBC: CPT | Performed by: NURSE PRACTITIONER

## 2023-03-03 PROCEDURE — 36415 COLL VENOUS BLD VENIPUNCTURE: CPT | Performed by: NURSE PRACTITIONER

## 2023-03-03 PROCEDURE — 99231 PR SUBSEQUENT HOSPITAL CARE,LEVL I: ICD-10-PCS | Mod: ,,, | Performed by: SURGERY

## 2023-03-03 PROCEDURE — 63600175 PHARM REV CODE 636 W HCPCS: Performed by: NURSE PRACTITIONER

## 2023-03-03 PROCEDURE — C9113 INJ PANTOPRAZOLE SODIUM, VIA: HCPCS | Performed by: NURSE PRACTITIONER

## 2023-03-03 PROCEDURE — 99233 SBSQ HOSP IP/OBS HIGH 50: CPT | Mod: ,,, | Performed by: INTERNAL MEDICINE

## 2023-03-03 PROCEDURE — 99231 SBSQ HOSP IP/OBS SF/LOW 25: CPT | Mod: ,,, | Performed by: SURGERY

## 2023-03-03 PROCEDURE — 80048 BASIC METABOLIC PNL TOTAL CA: CPT | Performed by: INTERNAL MEDICINE

## 2023-03-03 PROCEDURE — 20000000 HC ICU ROOM

## 2023-03-03 PROCEDURE — 83036 HEMOGLOBIN GLYCOSYLATED A1C: CPT | Performed by: NURSE PRACTITIONER

## 2023-03-03 RX ORDER — PANTOPRAZOLE SODIUM 40 MG/10ML
40 INJECTION, POWDER, LYOPHILIZED, FOR SOLUTION INTRAVENOUS DAILY
Status: DISCONTINUED | OUTPATIENT
Start: 2023-03-03 | End: 2023-03-06 | Stop reason: HOSPADM

## 2023-03-03 RX ORDER — CHLORHEXIDINE GLUCONATE ORAL RINSE 1.2 MG/ML
15 SOLUTION DENTAL 2 TIMES DAILY
Status: DISCONTINUED | OUTPATIENT
Start: 2023-03-03 | End: 2023-03-06 | Stop reason: HOSPADM

## 2023-03-03 RX ORDER — LEVETIRACETAM 5 MG/ML
500 INJECTION INTRAVASCULAR EVERY 12 HOURS
Status: DISCONTINUED | OUTPATIENT
Start: 2023-03-03 | End: 2023-03-06 | Stop reason: HOSPADM

## 2023-03-03 RX ORDER — MUPIROCIN 20 MG/G
OINTMENT TOPICAL 2 TIMES DAILY
Status: DISCONTINUED | OUTPATIENT
Start: 2023-03-03 | End: 2023-03-06 | Stop reason: HOSPADM

## 2023-03-03 RX ORDER — SODIUM BICARBONATE 650 MG/1
1300 TABLET ORAL ONCE
Status: COMPLETED | OUTPATIENT
Start: 2023-03-03 | End: 2023-03-03

## 2023-03-03 RX ADMIN — LEVETIRACETAM 500 MG: 5 INJECTION INTRAVENOUS at 08:03

## 2023-03-03 RX ADMIN — HYDRALAZINE HYDROCHLORIDE 10 MG: 20 INJECTION INTRAMUSCULAR; INTRAVENOUS at 05:03

## 2023-03-03 RX ADMIN — PANTOPRAZOLE SODIUM 40 MG: 40 INJECTION, POWDER, LYOPHILIZED, FOR SOLUTION INTRAVENOUS at 08:03

## 2023-03-03 RX ADMIN — CHLORHEXIDINE GLUCONATE 15 ML: 1.2 RINSE ORAL at 08:03

## 2023-03-03 RX ADMIN — INSULIN ASPART 2 UNITS: 100 INJECTION, SOLUTION INTRAVENOUS; SUBCUTANEOUS at 11:03

## 2023-03-03 RX ADMIN — MORPHINE SULFATE 2 MG: 2 INJECTION, SOLUTION INTRAMUSCULAR; INTRAVENOUS at 05:03

## 2023-03-03 RX ADMIN — MORPHINE SULFATE 2 MG: 2 INJECTION, SOLUTION INTRAMUSCULAR; INTRAVENOUS at 09:03

## 2023-03-03 RX ADMIN — MORPHINE SULFATE 2 MG: 2 INJECTION, SOLUTION INTRAMUSCULAR; INTRAVENOUS at 06:03

## 2023-03-03 RX ADMIN — MUPIROCIN 1 G: 20 OINTMENT TOPICAL at 08:03

## 2023-03-03 RX ADMIN — INSULIN ASPART 2 UNITS: 100 INJECTION, SOLUTION INTRAVENOUS; SUBCUTANEOUS at 07:03

## 2023-03-03 RX ADMIN — MUPIROCIN 1 G: 20 OINTMENT TOPICAL at 10:03

## 2023-03-03 RX ADMIN — HYDRALAZINE HYDROCHLORIDE 10 MG: 20 INJECTION INTRAMUSCULAR; INTRAVENOUS at 07:03

## 2023-03-03 RX ADMIN — MORPHINE SULFATE 2 MG: 2 INJECTION, SOLUTION INTRAMUSCULAR; INTRAVENOUS at 11:03

## 2023-03-03 RX ADMIN — CHLORHEXIDINE GLUCONATE 15 ML: 1.2 RINSE ORAL at 10:03

## 2023-03-03 RX ADMIN — SODIUM BICARBONATE 650 MG TABLET 1300 MG: at 03:03

## 2023-03-03 NOTE — ASSESSMENT & PLAN NOTE
Advance diet to liquids as recommended by surgery  IV Pain medication prn  Seen by surgery Dr. Thurman  No surgical intervention planned at this time

## 2023-03-03 NOTE — PROGRESS NOTES
The patient's chest x-ray is in line with her clinical exam.  There is no evidence of pneumonia on today's film.  Do not think the patient needs antibiotics for a pulmonary infection.

## 2023-03-03 NOTE — PROGRESS NOTES
Pulmonary/Critical Care Progress Note      PATIENT NAME: Tabby Howard  MRN: [id number]  TODAY'S DATE: 2023  3:57 PM  ADMIT DATE: 3/2/2023  AGE: 33 y.o. : 1990    CONSULT REQUESTED BY: Tosin Roberts MD    REASON FOR CONSULT:   Critical care management    HPI:  The patient is a 33-year-old dialysis patient who presented to the hospital with nausea vomiting and abdominal pain.  She was found to be severely hypoglycemic and is requiring a D10 drip.  She has also been found to have an intussusception which is being medically managed at this time.  The patient is lethargic but has normal ABGs.    3/3 the patient is not having any further nausea and vomiting.  Her hypoglycemia has resolved.  A erect and flat KUB have been ordered.  She is saturating 100% on room air.    REVIEW OF SYSTEMS    General: Feeling okay but tired  Eyes: Vision is good.  ENT:  No sinusitis or pharyngitis.   Heart: No chest pain or palpitations.  Lungs: No cough, sputum, or wheezing.  GI:  Mild abdominal pain  : No dysuria, hesitancy, or nocturia.  Skin: No lesions or rashes.  Musculoskeletal: No joint pain or myalgias.  Neuro: No headaches or neuropathy.  Lymph: No edema or adenopathy.  Psych: No anxiety or depression.  Endo: No weight change.      ALLERGIES  Review of patient's allergies indicates:   Allergen Reactions    Penicillins Hives       INPATIENT SCHEDULED MEDICATIONS   chlorhexidine  15 mL Mouth/Throat BID    levetiracetam IV  500 mg Intravenous Q12H    mupirocin   Nasal BID    pantoprazole  40 mg Intravenous Daily    sodium chloride 0.9%  30 mL/kg Intravenous Once      dextrose 10 % in water (D10W) Stopped (23 2320)       MEDICAL AND SURGICAL HISTORY  Past Medical History:   Diagnosis Date    CKD (chronic kidney disease), stage IV 2022    Diabetes mellitus due to underlying condition with unspecified complications 2022    Gastroparesis 2022    Heart failure with preserved ejection fraction 2022     EF 55% on 3/22    History of gastroesophageal reflux (GERD)     History of supraventricular tachycardia     Hyperkalemia 2022    Hypertensive emergency 2022    Sickle cell trait 2022    Type 2 diabetes mellitus      Past Surgical History:   Procedure Laterality Date     SECTION      x 3    COLONOSCOPY      COLONOSCOPY N/A 2022    Procedure: COLONOSCOPY;  Surgeon: Jagdeep Cedeno MD;  Location: [facility];  Service: Endoscopy;  Laterality: N/A;    ESOPHAGOGASTRODUODENOSCOPY N/A 10/18/2019    Procedure: ESOPHAGOGASTRODUODENOSCOPY (EGD);  Surgeon: Gianluca Mendez MD;  Location: [facility];  Service: Endoscopy;  Laterality: N/A;    ESOPHAGOGASTRODUODENOSCOPY N/A 2022    Procedure: EGD (ESOPHAGOGASTRODUODENOSCOPY);  Surgeon: Micky Paredes III, MD;  Location: [facility];  Service: Endoscopy;  Laterality: N/A;    ESOPHAGOGASTRODUODENOSCOPY N/A 2022    Procedure: EGD (ESOPHAGOGASTRODUODENOSCOPY);  Surgeon: Marcelo Zhong MD;  Location: [facility];  Service: Endoscopy;  Laterality: N/A;    LAPAROSCOPIC CHOLECYSTECTOMY N/A 2022    Procedure: CHOLECYSTECTOMY, LAPAROSCOPIC;  Surgeon: Grey Perez MD;  Location: [facility];  Service: General;  Laterality: N/A;    PLACEMENT OF DUAL-LUMEN VASCULAR CATHETER Left 2022    Procedure: INSERTION-CATHETER-JOSEPH;  Surgeon: Dionte Gan MD;  Location: [facility] OR;  Service: General;  Laterality: Left;    PLACEMENT OF DUAL-LUMEN VASCULAR CATHETER Right 2022    Procedure: INSERTION-CATHETER-Hemosplit;  Surgeon: Dionte Gan MD;  Location: [facility] OR;  Service: General;  Laterality: Right;       ALCOHOL, TOBACCO AND DRUG USE  Social History     Tobacco Use   Smoking Status Never   Smokeless Tobacco Never     Social History     Substance and Sexual Activity   Alcohol Use Not Currently     Social History     Substance and Sexual Activity   Drug Use No       FAMILY HISTORY  Family History   Problem Relation Age of Onset    Diabetes  Mother     Diabetes Father        VITAL SIGNS (MOST RECENT)  Temp: 98.4 °F (36.9 °C) (03/03/23 0700)  Pulse: 82 (03/03/23 1000)  Resp: 13 (03/03/23 1000)  BP: (!) 157/95 (03/03/23 1000)  SpO2: (!) 94 % (03/03/23 1000)    INTAKE AND OUTPUT (LAST 24 HOURS):  Intake/Output Summary (Last 24 hours) at 3/3/2023 1018  Last data filed at 3/2/2023 1854  Gross per 24 hour   Intake 1716.67 ml   Output 0 ml   Net 1716.67 ml       WEIGHT  Wt Readings from Last 1 Encounters:   03/02/23 58 kg (127 lb 13.9 oz)       PHYSICAL EXAM  GENERAL:  Mid aged patient, in no distress, sleeping when I walked in but awakens and stays awake today  HEENT: Pupils equal and reactive.  Heliotrope rash. Extraocular movements intact. Nose intact. Pharynx moist.  NECK: Supple.   HEART: Regular rate and rhythm. No murmur or gallop auscultated.  LUNGS: Clear to auscultation and percussion. Lung excursion symmetrical. No change in fremitus. No adventitial noises.  There is a hemo split in the right IJ  ABDOMEN: Bowel sounds present.  Mildly tender over lower abdomen, no masses palpated.  : Normal anatomy.  EXTREMITIES: Normal muscle tone and joint movement, no cyanosis or clubbing.   LYMPHATICS: No adenopathy palpated, no edema.  SKIN: Dry, intact, no lesions.   NEURO: Cranial nerves II-XII intact. Motor strength 5/5 bilaterally, upper and lower extremities.    PSYCH:  Quiet        CBC LAST (LAST 24 HOURS)  Recent Labs   Lab 03/03/23  0547   WBC 5.81   RBC 2.64*   HGB 7.6*   HCT 23.4*   MCV 89   MCH 28.8   MCHC 32.5   RDW 19.8*      MPV 9.9   GRAN 75.9*  4.4   LYMPH 13.9*  0.8*   MONO 8.3  0.5   BASO 0.01   NRBC 0       CHEMISTRY LAST (LAST 24 HOURS)  Recent Labs   Lab 03/02/23  1535 03/03/23  0105 03/03/23  0547   NA  --    < > 136   K  --    < > 4.0   CL  --    < > 96   CO2  --    < > 28   ANIONGAP  --    < > 12   BUN  --    < > 42*   CREATININE  --    < > 5.8*   GLU  --    < > 199*   CALCIUM  --    < > 7.5*   PH 7.383  --   --    ALBUMIN   --   --  2.6*   PROT  --   --  5.8*   ALKPHOS  --   --  338*   ALT  --   --  132*   AST  --   --  60*   BILITOT  --   --  2.2*    < > = values in this interval not displayed.         CARDIAC PROFILE (LAST 24 HOURS)  Recent Labs   Lab 03/02/23  0919   BNP >4,500*   TROPONINIHS 28.3*       LAST 7 DAYS MICROBIOLOGY   Microbiology Results (last 7 days)       Procedure Component Value Units Date/Time    Blood culture #2 **CANNOT BE ORDERED STAT** [[id number]] Collected: 03/02/23 1030    Order Status: Completed Specimen: Blood from Peripheral, Antecubital, Left Updated: 03/02/23 1758     Blood Culture, Routine No Growth to date    Blood culture #1 **CANNOT BE ORDERED STAT** [[id number]] Collected: 03/02/23 1015    Order Status: Completed Specimen: Blood from Peripheral, Antecubital, Right Updated: 03/02/23 1758     Blood Culture, Routine No Growth to date    Culture, Respiratory with Gram Stain [[id number]]     Order Status: No result Specimen: Respiratory from Sputum, Expectorated             MOST RECENT IMAGING  CT Abdomen Pelvis With Contrast  CMS MANDATED QUALITY DATA - CT RADIATION - 436    All CT scans at this facility utilize dose modulation, iterative reconstruction, and/or weight based dosing when appropriate to reduce radiation dose to as low as reasonably achievable.    Reason: Nausea/vomiting    TECHNIQUE: CT abdomen and pelvis with 100 mL Omnipaque 350.    COMPARISON: CT abdomen and pelvis January 25, 2023.    FINDINGS:    Please refer to same day CTA chest for evaluation of the thoracic viscera.    Liver is normal size. Elongated left hepatic lobe noted. No enhancing liver lesions identified. The gallbladder has been removed. The pancreas, spleen and adrenal glands are unremarkable. The kidneys are normal size. No hydronephrosis or nephrolithiasis. The visualization of the ureters. The bladder is fluid-filled with no focal wall abnormalities. The uterus is unremarkable. The adnexal structures are grossly  unremarkable. There is mild free fluid in the pelvic cul-de-sac.    Large and small bowel are normal caliber. There is no bowel wall thickening or inflammatory changes. There is a focus of intussusception involving the proximal jejunum (series 4 image 134 and series 6 image 28). Small bowel is normal caliber with no sign of obstruction demonstrated. Stomach is mildly fluid filled and grossly unremarkable. The appendix is normal.    The abdominal aorta is normal caliber. There is no intra-abdominal lymphadenopathy. No mesenteric fat stranding. There is trace free fluid in the pelvis and left pericolic gutter.    The abdominal aorta is normal caliber. Atherosclerosis of the intra-abdominal arteries observed. There is anasarca of the ventral abdominal wall subcutaneous tissues.    No acute osseous abnormality.    IMPRESSION:    1.  Small focus of intussusception involving the proximal jejunum without evidence of obstruction. This most likely reflects transient intussusception. Clinical correlation recommended.  2.  Mild free fluid in the pelvic cul-de-sac and left pericolic gutter of undetermined significance.  3.  Additional incidental observations as described.    Electronically signed by:  Juaquin Will DO  3/2/2023 1:07 PM CST Workstation: [id number]  CTA Chest Non-Coronary (PE Studies)  CMS MANDATED QUALITY DATA-CT RADIATION-436    HISTORY: Pulmonary embolism suspected. Positive d-dimer.    FINDINGS: Thin axial imaging through the chest was performed with 100 mL of Visipaque 350 IV contrast, with sagittal and coronal images, MIPS, and or 3D reconstructions performed. All CT exams at this facility use dose modulation, iterative reconstruction, and or weight based dosing when appropriate to reduce radiation dose to as low as reasonably achievable.    Examination is of diagnostic quality as there is normal opacification of pulmonary arterial tree out to the tertiary order branch vessels without evidence of pruning,  truncation, or webbing the pulmonary arterial system. No evidence of saddle embolus is established at the branch the main pulmonary trunk. Cardiac chambers maintain normal size and overall configuration. Moderate sized pericardial effusion.    Lung windows windows settings demonstrate evidence of diffuse areas of ground glass opacity and airspace consolidation that is globally although most pronounced in the upper lobes bilaterally, (right greater than left). Bilateral pleural effusion with evidence with the fluid is slightly more greater towards the right. Mild subpleural edema is noted.    Tracheal lumen appears patent without evidence of endobronchial lesions.    The upper abdominal cavity appears unremarkable. Marginal backflow contrast into the superior vena cava suggestive of cardiac decompensation.    IMPRESSION:  1. No CT evidence of acute pulmonary thromboembolism.  2. Groundglass opacity changes and areas of airspace consolidation the upper lobes bilaterally with more prominent towards the right likely due to multifocal pneumonia.  3. Moderate intralobular and intralobar septal thickening attributed towards cardiac decompensation with enlarged cardiac chambers secondary to uncompensated congestive heart failure..    Electronically signed by:  Deandre Vitale MD  3/2/2023 1:02 PM CST Workstation: [id number]  CT Head Without Contrast  CT of THE HEAD WITHOUT CONTRAST    HISTORY: Mental status changes. Low glucose. Patient unresponsive.    Technical factors:   Spiral acquisition of the brain was generated at 3.0 mm thickness from the skull base to the skull vertex in helical fashion in the absence of intravenous contrast.  Additional coronal and sagittal reconstructed images were also included and reviewed.    COMPARISON: 11/7/2022    FINDINGS:  Appropriate demarcation of the gray-white matter interface is noted. No evidence of outstanding intracranial density changes. No evidence of masses, mass effect, or  midline shift. Ventricles maintain normal size and overall configuration. Third ventricle appears normal. Prominent atheromatous calcifications of the supraclinoid ICAs bilaterally.    Calvarium appears intact. Mastoid air cells are well pneumatized. Stable left maxillary sinus mucoperiosteal thickening is again redemonstrated.    IMPRESSION:  1. No evidence of acute intracranial pathology.  2. Stable left maxillary sinus disease.    Electronically signed by:  Deandre Vitale MD  3/2/2023 12:57 PM CST Workstation: [id number]  X-Ray Chest 1 View  Reason: Nausea and vomiting.    FINDINGS:    Portable chest with comparison chest x-ray January 23, 2023 show unchanged enlarged cardiomediastinal silhouette. Right internal jugular dialysis catheter is unchanged.  Right perihilar hazy ill-defined opacification noted. Left lung is clear. Pulmonary vasculature is normal. No acute osseous abnormality.    IMPRESSION:    Right perihilar hazy ill-defined opacification noted and could reflect infiltrate or atelectasis.    Electronically signed by:  Juaquin Will DO  3/2/2023 9:50 AM CST Workstation: [id number]      CURRENT VISIT EKG  Results for orders placed or performed during the hospital encounter of 03/02/23   EKG 12-lead    Narrative    Test Reason : R11.10,    Vent. Rate : 089 BPM     Atrial Rate : 089 BPM     P-R Int : 176 ms          QRS Dur : 080 ms      QT Int : 418 ms       P-R-T Axes : 037 -17 014 degrees     QTc Int : 508 ms    Normal sinus rhythm  Possible Left atrial enlargement  Prolonged QT  Abnormal ECG  When compared with ECG of 07-FEB-2023 05:36,  Questionable change in The axis    Referred By:             Confirmed By:        ECHOCARDIOGRAM RESULTS  Results for orders placed during the hospital encounter of 01/12/23    Echo    Interpretation Summary  · The left ventricle is normal in size with moderate concentric hypertrophy and normal systolic function.  · The estimated ejection fraction is 55%.  · Grade  III left ventricular diastolic dysfunction.  · Normal right ventricular size with normal right ventricular systolic function.  · Moderate left atrial enlargement.  · Moderate to severe tricuspid regurgitation.  · Mild to moderate pulmonic regurgitation.  · Mild mitral regurgitation.  · Normal central venous pressure (3 mmHg).  · The estimated PA systolic pressure is 56 mmHg.  · There is pulmonary hypertension.  · Moderate to large circumferential pericardial effusion. No evidence of tamponade.        Oxygen INFORMATION   Room air       LAST ARTERIAL BLOOD GAS  ABG  Recent Labs   Lab 03/02/23  1535   PH 7.383   PO2 104*   PCO2 50.3*   HCO3 30.0*   BE 5       IMPRESSION AND PLAN  Multilobar pneumonia on CT yesterday but clinically no signs of this  Chronic hypercapnic respiratory failure  Volume overload  Hypoglycemia, resolved  Intersussusception, hopefully resolved  Anemia  End-stage renal disease  Hyperglycemia  Transaminitis and hyperbilirubinemia, improving  Pericardial effusion   Pulmonary hypertension, etiology unclear  Hypertension        Continue antibiotics  Morphine for pain control as she is NPO  Hydralazine for hypertension  Dialysis per Dr. Benitez  NPO per surgery pending KUB  Check chest x-ray this morning given patient's sats of 100% on room air and clear lung findings      Iveth Berman MD  [facility]  Department of Pulmonology  Date of Service: 03/03/2023  3:57 PM

## 2023-03-03 NOTE — PROGRESS NOTES
Nephrology Consult Note        Patient Name: Tabby Howard  MRN: [id number]    Patient Class: IP- Inpatient   Admission Date: 3/2/2023  Length of Stay: 1 days  Date of Service: 3/3/2023    Attending Physician: Tosin Roberts MD  Primary Care Provider: Primary Doctor No    Reason for Consult: esrd/anemia    SUBJECTIVE:     HPI: 33F with ESRD on HD TTS by Dr. Benitez/Henry/Amber, diabetes, gastroparesis, chronic pain, congestive heart failure, history of SVT, sickle cell trait, hypertensive emergencies, presents emergency department with nausea, vomiting, abdominal pain, diarrhea. Her last dialysis was on Tuesday and she has been reasonably compliant. Since last night she is had multiple episodes of vomiting approximally 10, non-bloody, non-bilious, and about 5 episodes of loose watery stool, accompanied by some lower abdominal pain. She can not keep anything down. Patient has had a cholecystectomy and  in the past.    3/3 VSS, more awake. No urgent HD needs, monitor today, re-evaluate in AM. Appreciate Surgery, Pulm input.    Past Medical History:   Diagnosis Date    CKD (chronic kidney disease), stage IV 2022    Diabetes mellitus due to underlying condition with unspecified complications 2022    Gastroparesis 2022    Heart failure with preserved ejection fraction 2022    EF 55% on 3/22    History of gastroesophageal reflux (GERD)     History of supraventricular tachycardia     Hyperkalemia 2022    Hypertensive emergency 2022    Sickle cell trait 2022    Type 2 diabetes mellitus      Past Surgical History:   Procedure Laterality Date     SECTION      x 3    COLONOSCOPY      COLONOSCOPY N/A 2022    Procedure: COLONOSCOPY;  Surgeon: Jagdeep Cedeno MD;  Location: [facility];  Service: Endoscopy;  Laterality: N/A;    ESOPHAGOGASTRODUODENOSCOPY N/A 10/18/2019    Procedure: ESOPHAGOGASTRODUODENOSCOPY (EGD);  Surgeon: Gianluca Mendez MD;  Location: [facility];  Service:  Endoscopy;  Laterality: N/A;    ESOPHAGOGASTRODUODENOSCOPY N/A 08/24/2022    Procedure: EGD (ESOPHAGOGASTRODUODENOSCOPY);  Surgeon: Micky Paredes III, MD;  Location: [facility] ENDO;  Service: Endoscopy;  Laterality: N/A;    ESOPHAGOGASTRODUODENOSCOPY N/A 12/5/2022    Procedure: EGD (ESOPHAGOGASTRODUODENOSCOPY);  Surgeon: Marcelo Zhong MD;  Location: [facility] ENDO;  Service: Endoscopy;  Laterality: N/A;    LAPAROSCOPIC CHOLECYSTECTOMY N/A 07/30/2022    Procedure: CHOLECYSTECTOMY, LAPAROSCOPIC;  Surgeon: Grey Perez MD;  Location: [facility] OR;  Service: General;  Laterality: N/A;    PLACEMENT OF DUAL-LUMEN VASCULAR CATHETER Left 07/12/2022    Procedure: INSERTION-CATHETER-JOSEPH;  Surgeon: Dionte Gan MD;  Location: [facility] OR;  Service: General;  Laterality: Left;    PLACEMENT OF DUAL-LUMEN VASCULAR CATHETER Right 07/26/2022    Procedure: INSERTION-CATHETER-Hemosplit;  Surgeon: Dionte Gan MD;  Location: [facility] OR;  Service: General;  Laterality: Right;     Family History   Problem Relation Age of Onset    Diabetes Mother     Diabetes Father      Social History     Tobacco Use    Smoking status: Never    Smokeless tobacco: Never   Substance Use Topics    Alcohol use: Not Currently    Drug use: No       Review of patient's allergies indicates:   Allergen Reactions    Penicillins Hives       Outpatient meds:  No current facility-administered medications on file prior to encounter.     Current Outpatient Medications on File Prior to Encounter   Medication Sig Dispense Refill    albuterol (PROVENTIL/VENTOLIN HFA) 90 mcg/actuation inhaler Inhale 2 puffs into the lungs every 6 (six) hours as needed for Wheezing. Rescue 18 g 3    amLODIPine (NORVASC) 10 MG tablet Take 1 tablet (10 mg total) by mouth once daily. 30 tablet 11    amLODIPine (NORVASC) 10 MG tablet Take 1 tablet every day by oral route for 30 days. (Patient taking differently: Take 10 mg by mouth once daily.) 30 tablet 2    apixaban (ELIQUIS) 5 mg Tab Take  5 mg by mouth 2 (two) times daily.      apixaban (ELIQUIS) 5 mg Tab Take 1 tablet (5 mg total) by mouth 2 (two) times daily. 60 tablet 2    apixaban (ELIQUIS) 5 mg Tab Take 1 tablet twice a day by oral route for 30 days. (Patient taking differently: Take 5 mg by mouth 2 (two) times daily.) 60 tablet 2    BINAXNOW COVID-19 AG SELF TEST Kit Take 1 Dose by mouth once.      carvediloL (COREG) 25 MG tablet Take 1 tablet twice a day by oral route for 30 days. (Patient taking differently: Take 25 mg by mouth 2 (two) times daily.) 60 tablet 2    cloNIDine 0.2 mg/24 hr td ptwk (CATAPRES) 0.2 mg/24 hr Apply 1 patch to skin once every week. (Patient taking differently: Place 1 patch onto the skin once a week.) 4 patch 2    dicyclomine (BENTYL) 10 MG capsule Take 1 capsule (10 mg total) by mouth 3 (three) times daily. 90 capsule 0    dicyclomine (BENTYL) 10 MG capsule Take 1 capsule 3 times a day by oral route for 30 days. (Patient taking differently: Take 10 mg by mouth 3 (three) times daily.) 90 capsule 2    ferrous sulfate 325 (65 FE) MG EC tablet Take 325 mg by mouth once daily.      FLUoxetine 20 MG capsule Take 1 capsule (20 mg total) by mouth once daily. 30 capsule 11    FLUoxetine 20 MG capsule Take 1 capsule every day by oral route for 30 days. (Patient taking differently: Take 20 mg by mouth once daily.) 30 capsule 2    furosemide (LASIX) 40 MG tablet Take 40 mg by mouth 2 (two) times a day.      gabapentin (NEURONTIN) 100 MG capsule Take 1 capsule (100 mg total) by mouth 2 (two) times daily. 90 capsule 2    gabapentin (NEURONTIN) 100 MG capsule Take 1 capsule by mouth 3 times daily (Patient taking differently: Take 100 mg by mouth 3 (three) times daily.) 90 capsule 2    hydrALAZINE (APRESOLINE) 100 MG tablet Take 1 tablet twice a day by oral route for 30 days. (Patient taking differently: Take 100 mg by mouth 2 (two) times daily.) 60 tablet 2    HYDROcodone-acetaminophen (NORCO)  mg per tablet Take 1 tablet by  mouth every 4 (four) hours as needed for Pain.      HYDROmorphone (DILAUDID) 4 MG tablet Take 4 mg by mouth every 12 (twelve) hours as needed for Pain.      insulin aspart U-100 (NOVOLOG FLEXPEN U-100 INSULIN) 100 unit/mL (3 mL) InPn pen Inject 10 units under the skin 3 times a day before meals. (Patient taking differently: Inject 10 Units into the skin 3 (three) times daily with meals.) 15 mL 5    insulin aspart U-100 (NOVOLOG) 100 unit/mL (3 mL) InPn pen Inject 1-10 Units into the skin before meals and at bedtime as needed (Hyperglycemia). Max daily dose 40 units. 3 mL 6    insulin detemir U-100 (LEVEMIR FLEXTOUCH U-100 INSULN) 100 unit/mL (3 mL) InPn pen Inject 18 units every day by subcutaneous route in the evening. (Patient taking differently: Inject 18 Units into the skin every evening.) 15 mL 5    insulin glargine 100 units/mL SubQ pen Inject 16 Units into the skin every evening.      isosorbide mononitrate (IMDUR) 60 MG 24 hr tablet Take 2 tablets (120 mg total) by mouth once daily. 30 tablet 11    isosorbide mononitrate (IMDUR) 60 MG 24 hr tablet Take 1 tablet every day by oral route for 30 days. (Patient taking differently: Take 60 mg by mouth once daily.) 30 tablet 2    levETIRAcetam (KEPPRA) 500 MG Tab Take 1 tablet (500 mg total) by mouth 2 (two) times daily. 60 tablet 11    levETIRAcetam (KEPPRA) 500 MG Tab Take 1 tablet twice a day by oral route for 30 days. (Patient taking differently: Take 500 mg by mouth once daily.) 60 tablet 2    losartan-hydrochlorothiazide 100-12.5 mg (HYZAAR) 100-12.5 mg Tab Take 1 tablet by mouth once daily.      mirtazapine (REMERON SOL-TAB) 15 MG disintegrating tablet Take 1 tablet (15 mg total) by mouth nightly. 90 tablet 0    ondansetron (ZOFRAN) 4 MG tablet Take 1 tablet (4 mg total) by mouth every 8 (eight) hours as needed for Nausea. 60 tablet 2    ondansetron (ZOFRAN-ODT) 4 MG TbDL Place1 tablet on the tongue every 8 hours as needed (Patient taking differently: Take 4  "mg by mouth every 6 (six) hours as needed.) 24 tablet 0    pantoprazole (PROTONIX) 40 MG tablet Take 40 mg by mouth once daily.      pantoprazole (PROTONIX) 40 MG tablet Take 1 tablet every day by oral route for 30 days. (Patient taking differently: Take 40 mg by mouth once daily.) 30 tablet 2    pen needle, diabetic (BD ULTRA-FINE MAGALIE PEN NEEDLE) 32 gauge x 5/32" Ndle Inject into the skin 5 times per day with insulin 100 each 12    promethazine (PHENERGAN) 25 MG suppository Place 1 suppository (25 mg total) rectally every 6 (six) hours as needed for Nausea. 10 suppository 0    torsemide (DEMADEX) 100 MG Tab Take 100 mg by mouth once daily.      [DISCONTINUED] atenoloL (TENORMIN) 50 MG tablet Take 1 tablet (50 mg total) by mouth every other day. 45 tablet 3    [DISCONTINUED] omeprazole (PRILOSEC) 20 MG capsule Take 2 capsules (40 mg total) by mouth once daily. for 10 days 20 capsule 0       Scheduled meds:   chlorhexidine  15 mL Mouth/Throat BID    levetiracetam IV  500 mg Intravenous Q12H    mupirocin   Nasal BID    pantoprazole  40 mg Intravenous Daily    sodium chloride 0.9%  30 mL/kg Intravenous Once       Infusions:   dextrose 10 % in water (D10W) Stopped (03/02/23 2320)       PRN meds:  acetaminophen, acetaminophen, bisacodyL, dextrose 50%, dextrose 50%, glucagon (human recombinant), hydrALAZINE, insulin aspart U-100, morphine, ondansetron, Pharmacy to dose Vancomycin consult **AND** vancomycin - pharmacy to dose    Review of Systems:    OBJECTIVE:     Vital Signs and IO:  Temp:  [93 °F (33.9 °C)-98.5 °F (36.9 °C)]   Pulse:  [68-90]   Resp:  [10-43]   BP: (138-181)/()   SpO2:  [89 %-100 %]   I/O last 3 completed shifts:  In: 1716.7 [I.V.:1416.7; IV Piggyback:300]  Out: 0   Wt Readings from Last 5 Encounters:   03/02/23 58 kg (127 lb 13.9 oz)   02/08/23 59.9 kg (132 lb)   02/06/23 59.9 kg (132 lb)   02/05/23 59.9 kg (132 lb)   01/26/23 43.9 kg (96 lb 12.5 oz)     Body mass index is 23.39 " kg/m².    Physical Exam  Constitutional:       General: Patient is not in acute distress.     Appearance: Patient is well-developed. She is not diaphoretic.   HENT:      Head: Normocephalic and atraumatic.      Mouth/Throat: Mucous membranes are moist.   Eyes:      General: No scleral icterus.     Pupils: Pupils are equal, round, and reactive to light.   Cardiovascular:      Rate and Rhythm: Normal rate and regular rhythm.   Pulmonary:      Effort: Pulmonary effort is normal. No respiratory distress.      Breath sounds: No stridor.   Abdominal:      General: There is no distension.      Palpations: Abdomen is soft.   Musculoskeletal:         General: No deformity. Normal range of motion.      Cervical back: Neck supple.   Skin:     General: Skin is warm and dry.      Findings: No rash present. No erythema.   Neurological:      Mental Status: Patient is alert and oriented to person, place, and time.      Cranial Nerves: No cranial nerve deficit.   Psychiatric:         Behavior: Behavior normal.     Laboratory:  Recent Labs   Lab 03/02/23  0919 03/02/23  1314 03/03/23  0105 03/03/23  0547     --  137 136   K 3.7  --  3.9 4.0   CL 98  --  96 96   CO2 26  --  28 28   BUN 35*  --  41* 42*   CREATININE 5.0*  --  5.6* 5.8*   * 144* 172* 199*         Recent Labs   Lab 03/02/23  0919 03/03/23  0105 03/03/23  0547   CALCIUM 8.6* 6.9* 7.5*   ALBUMIN 3.3*  --  2.6*   MG 2.0  --   --          Recent Labs   Lab 07/08/22  0516   PTH, Intact 289.8 H         No results for input(s): POCTGLUCOSE in the last 168 hours.    Recent Labs   Lab 08/24/22  0218 12/23/22  1413 03/03/23  0547   Hemoglobin A1C 6.2 7.5 H 5.8         Recent Labs   Lab 03/02/23  0919 03/02/23  1535 03/03/23  0547   WBC 8.00  --  5.81   HGB 7.7*  --  7.6*   HCT 23.6* 25* 23.4*   *  --  151   MCV 90  --  89   MCHC 32.6  --  32.5   MONO 4.3  0.3  --  8.3  0.5         Recent Labs   Lab 03/02/23  0919 03/03/23  0547   BILITOT 3.5* 2.2*   PROT 7.7  5.8*   ALBUMIN 3.3* 2.6*   ALKPHOS 415* 338*   * 132*   AST 92* 60*         Recent Labs   Lab 10/24/22  0107 11/19/22  1210 02/05/23  0156   Color, UA Yellow Yellow Yellow   Appearance, UA Clear Clear Clear   pH, UA 8.0 8.0 >8.0 A   Specific [place], UA 1.020 1.025 1.020   Protein, UA 4+ 4+ 3+ A   Glucose, UA 4+ A 4+ A 2+ A   Ketones, UA Negative Negative 1+ A   Urobilinogen, UA Negative Negative Negative   Bilirubin (UA) Negative 1+ A 2+ A   Occult Blood UA 2+ A 2+ A 3+ A   Nitrite, UA Negative Negative Negative   RBC, UA 41 H 38 H 15 H   WBC, UA 24 H 25 H 1   Bacteria Negative Negative Rare   Hyaline Casts, UA 6 A 2 A 2 A         Recent Labs   Lab 02/07/23  0855 03/02/23  1225 03/02/23  1535   POC PH 7.397 7.377 7.383   POC PCO2 41.6 54.8 H 50.3 H   POC HCO3 25.6 32.2 H 30.0 H   POC PO2 89 113 H 104 H   POC SATURATED O2 97 98 98   POC BE 1 7 5   Sample ARTERIAL ARTERIAL ARTERIAL         Microbiology Results (last 7 days)       Procedure Component Value Units Date/Time    Blood culture #1 **CANNOT BE ORDERED STAT** [[id number]] Collected: 03/02/23 1015    Order Status: Completed Specimen: Blood from Peripheral, Antecubital, Right Updated: 03/03/23 1232     Blood Culture, Routine No Growth to date      No Growth to date    Blood culture #2 **CANNOT BE ORDERED STAT** [[id number]] Collected: 03/02/23 1030    Order Status: Completed Specimen: Blood from Peripheral, Antecubital, Left Updated: 03/03/23 1232     Blood Culture, Routine No Growth to date      No Growth to date    Culture, Respiratory with Gram Stain [[id number]]     Order Status: No result Specimen: Respiratory from Sputum, Expectorated             ASSESSMENT/PLAN:     ESRD on HD TTS via AVF  Next HD PRN, then per schedule.  Continue current dialysis prescription.  Renal diet - low K, low phos.  No IVs or BP checks on access and/or non-dominant arm.    Anemia of CKD  Hgb and HCT are acceptable. Monitor for now.  Will provide SHELLEY and/or IV iron  PRN.  Consider transfusion in AM.    MBD / Secondary HPT  Ca, Phos, PTH and vitamin D levels are acceptable.   Phos binders, vitamin D and analogues, calcimimetics will be given as needed.    HTN  BP seem controlled.   Tolerate asymptomatic HTN up to -160.  Continue home meds.  Low sodium diet.    Abdominal pain 2/2 intestinal intussusception  Management per primary team, Surgery.    Thank you for allowing us to participate in the care of your patient!   We will follow the patient and provide recommendations as needed.    Patient care time was spent personally by me on the following activities:     Obtaining a history.  Examination of patient.  Providing medical care at the patients bedside.  Developing a treatment plan with patient or surrogate and bedside caregivers.  Ordering and reviewing laboratory studies, radiographic studies, pulse oximetry.  Ordering and performing treatments and interventions.  Evaluation of patient's response to treatment.  Discussions with consultants while on the unit and immediately available to the patient.  Re-evaluation of the patient's condition.  Documentation in the medical record.     Mando Benitez MD    [facility]  [address] [place] [address]  [place] [address]    [phone] - tel  [phone] - fax    3/3/2023

## 2023-03-03 NOTE — ASSESSMENT & PLAN NOTE
This patient does have evidence of infective focus  My overall impression is sepsis.  Source: Abdominal  Antibiotics given-   Antibiotics (72h ago, onward)    Start     Stop Route Frequency Ordered    03/03/23 1000  mupirocin 2 % ointment  (MRSA Decolonization Orders [facility])         03/08 0859 Nasl 2 times daily 03/03/23 0848    03/02/23 1415  vancomycin - pharmacy to dose  ( Pneumonia - Moderate-High MDR )        See Hyperspace for full Linked Orders Report.    -- IV pharmacy to manage frequency 03/02/23 1320        Latest lactate reviewed-  Recent Labs   Lab 03/02/23  1341   LACTATE 1.4       Fluid challenge Contraindicated- Fluid bolus is contraindicated in this patient due to End Stage Renal Disease     Post- resuscitation assessment No - Post resuscitation assessment not needed       Will Not start Pressors- Levophed for MAP of 65    Initially started on IV cefepime and IV vancomycin ED  MRSA, blood cultures, procalcitonin.  Today history of end-stage renal disease on dialysis did not order lactic or CRP may give false elevated reading  Will continue IV cefepime for possible pneumonia  CBC, CMP  Keep O2 greater than 90%

## 2023-03-03 NOTE — ASSESSMENT & PLAN NOTE
Patient's FSGs are uncontrolled due to hypoglycemia on current medication regimen.  Last A1c reviewed-   Lab Results   Component Value Date    HGBA1C 7.5 (H) 12/23/2022     Most recent fingerstick glucose reviewed- No results for input(s): POCTGLUCOSE in the last 24 hours.  Current correctional scale  none  none anti-hyperglycemic dose as follows-   Antihyperglycemics (From admission, onward)    Start     Stop Route Frequency Ordered    03/03/23 0025  insulin aspart U-100 pen 0-5 Units         -- SubQ Every 6 hours PRN 03/02/23 2328        History of diabetes type 2 hold home medications  q4hr  glucose monitoring  Hypoglycemia protocol  D10 125 mL an hour on hold for now  IM glucagon 1 mg p.r.n. if glucose remains <30  Hold Oral hypoglycemics while patient is in the hospital.

## 2023-03-03 NOTE — SUBJECTIVE & OBJECTIVE
Interval History:   03/03/23: still with abdomen pain. Requesting to eat. No BM but passing gas. Abdomen is tender to palpate right and mid upper quad. Blood sugars better controlled no further hypoglycemic events. Will continue glucose checks q4hr. Am labs surgery following no surgical intervention recommended at this time. Afebrile  Nephrology following possible HD today.           Review of Systems  Objective:     Vital Signs (Most Recent):  Temp: 98.4 °F (36.9 °C) (03/03/23 0700)  Pulse: 82 (03/03/23 1000)  Resp: 13 (03/03/23 1000)  BP: (!) 157/95 (03/03/23 1000)  SpO2: (!) 94 % (03/03/23 1000)   Vital Signs (24h Range):  Temp:  [93 °F (33.9 °C)-98.5 °F (36.9 °C)] 98.4 °F (36.9 °C)  Pulse:  [68-89] 82  Resp:  [11-43] 13  SpO2:  [89 %-100 %] 94 %  BP: (138-195)/() 157/95     Weight: 58 kg (127 lb 13.9 oz)  Body mass index is 23.39 kg/m².    Intake/Output Summary (Last 24 hours) at 3/3/2023 1023  Last data filed at 3/2/2023 1854  Gross per 24 hour   Intake 1716.67 ml   Output 0 ml   Net 1716.67 ml      Physical Exam  Vitals and nursing note reviewed.   Constitutional:       Comments: Tired will awake and answer questions appropriately    HENT:      Head: Normocephalic and atraumatic.      Nose: Nose normal.      Mouth/Throat:      Mouth: Mucous membranes are dry.   Eyes:      Pupils: Pupils are equal, round, and reactive to light.   Cardiovascular:      Rate and Rhythm: Normal rate and regular rhythm.      Pulses: Normal pulses.   Pulmonary:      Effort: Pulmonary effort is normal.      Breath sounds: Normal breath sounds.   Abdominal:      General: Bowel sounds are normal.      Tenderness: There is abdominal tenderness.      Comments: Tenderness to right and mid upper abdomen with palpation   Musculoskeletal:         General: Normal range of motion.      Cervical back: Neck supple.   Skin:     General: Skin is warm and dry.   Neurological:      General: No focal deficit present.      Mental Status: She is  oriented to person, place, and time.       Significant Labs: All pertinent labs within the past 24 hours have been reviewed.  A1C:   Recent Labs   Lab 12/23/22  1413   HGBA1C 7.5*     ABGs:   Recent Labs   Lab 03/02/23  1225 03/02/23  1535   PH 7.377 7.383   PCO2 54.8* 50.3*   HCO3 32.2* 30.0*   POCSATURATED 98 98   BE 7 5   PO2 113* 104*     Bilirubin:   Recent Labs   Lab 02/06/23  1951 02/07/23  0740 02/08/23  0505 03/02/23  0919 03/03/23  0547   BILITOT 2.4* 2.8* 1.8* 3.5* 2.2*     Blood Culture:   Recent Labs   Lab 03/02/23  1015 03/02/23  1030   LABBLOO No Growth to date No Growth to date     BMP:   Recent Labs   Lab 03/02/23  0919 03/02/23  1314 03/03/23  0547   *   < > 199*      < > 136   K 3.7   < > 4.0   CL 98   < > 96   CO2 26   < > 28   BUN 35*   < > 42*   CREATININE 5.0*   < > 5.8*   CALCIUM 8.6*   < > 7.5*   MG 2.0  --   --     < > = values in this interval not displayed.     CBC:   Recent Labs   Lab 03/02/23  0919 03/02/23  1535 03/03/23  0547   WBC 8.00  --  5.81   HGB 7.7*  --  7.6*   HCT 23.6* 25* 23.4*   *  --  151     CMP:   Recent Labs   Lab 03/02/23  0919 03/02/23  1314 03/03/23  0105 03/03/23  0547     --  137 136   K 3.7  --  3.9 4.0   CL 98  --  96 96   CO2 26  --  28 28   * 144* 172* 199*   BUN 35*  --  41* 42*   CREATININE 5.0*  --  5.6* 5.8*   CALCIUM 8.6*  --  6.9* 7.5*   PROT 7.7  --   --  5.8*   ALBUMIN 3.3*  --   --  2.6*   BILITOT 3.5*  --   --  2.2*   ALKPHOS 415*  --   --  338*   AST 92*  --   --  60*   *  --   --  132*   ANIONGAP 16  --  13 12     Cardiac Markers:   Recent Labs   Lab 03/02/23  0919   BNP >4,500*     Coagulation: No results for input(s): PT, INR, APTT in the last 48 hours.  Lactic Acid:   Recent Labs   Lab 03/02/23  1341   LACTATE 1.4     Lipase:   Recent Labs   Lab 03/02/23  0919   LIPASE 35     Lipid Panel: No results for input(s): CHOL, HDL, LDLCALC, TRIG, CHOLHDL in the last 48 hours.  Magnesium:   Recent Labs   Lab  03/02/23  0919   MG 2.0     Pathology Results  (Last 10 years)                 12/05/22 1038  Specimen to Pathology, Surgery Gastrointestinal tract Final result    Narrative:  Pre-op Diagnosis: epigastric pain, intractable abdominal pain   Procedure(s):   EGD (ESOPHAGOGASTRODUODENOSCOPY)   Number of specimens:1   Name of specimens: 1. Antrum/body bx- gastritis   Which provider would you like to cc?->KOKO YUSUF   Release to patient->Immediate   Specimen total (fresh, frozen, permanent):->1       07/30/22 1127  Specimen to Pathology, Surgery General Surgery Final result    Narrative:  Pre-op Diagnosis: Epigastric pain [R10.13]   Procedure(s):   CHOLECYSTECTOMY, LAPAROSCOPIC   Number of specimens: 1   Name of specimens: gallbladder   Which provider would you like to cc?->MARIBEL PRINGLE   Release to patient->Immediate   Specimen total (fresh, frozen, permanent):->1             POCT Glucose: No results for input(s): POCTGLUCOSE in the last 48 hours.  Prealbumin: No results for input(s): PREALBUMIN in the last 48 hours.  Respiratory Culture: No results for input(s): GSRESP, RESPIRATORYC in the last 48 hours.  Troponin:   Recent Labs   Lab 03/02/23  0919   TROPONINIHS 28.3*     TSH:   Recent Labs   Lab 02/08/23  0505   TSH 1.141     Urine Culture: No results for input(s): LABURIN in the last 48 hours.  Urine Studies: No results for input(s): COLORU, APPEARANCEUA, PHUR, SPECGRAV, PROTEINUA, GLUCUA, KETONESU, BILIRUBINUA, OCCULTUA, NITRITE, UROBILINOGEN, LEUKOCYTESUR, RBCUA, WBCUA, BACTERIA, SQUAMEPITHEL, HYALINECASTS in the last 48 hours.    Invalid input(s): WRIGHTSUR    Significant Imaging: I have reviewed all pertinent imaging results/findings within the past 24 hours.  I have reviewed and interpreted all pertinent imaging results/findings within the past 24 hours.

## 2023-03-03 NOTE — PROGRESS NOTES
VANCOMYCIN PHARMACOKINETIC NOTE:  Vancomycin Day #2    Objective:    33 y.o., female, Actual Body Weight = 58 kg (127 lb 13.9 oz)    Diagnosis/Indication for Vancomycin:  Intra-abdominal infection and Pneumonia   Desired Vancomycin Peak:  30-35 mcg/ml; Desired Trough: 10-15 mcg/ml    Current Vancomycin Regimen:  intermittent dosing       Receiving other antibiotics:  Antibiotics (From admission, onward)      Start     Stop Route Frequency Ordered    03/03/23 1000  mupirocin 2 % ointment  (MRSA Decolonization Orders [facility])         03/08 0859 Nasl 2 times daily 03/03/23 0848    03/02/23 1415  vancomycin - pharmacy to dose  ( Pneumonia - Moderate-High MDR )        See Hyperspace for full Linked Orders Report.    -- IV pharmacy to manage frequency 03/02/23 1320             The patient has the following labs:     3/3/2023 Estimated Creatinine Clearance: 10.8 mL/min (A) (based on SCr of 5.8 mg/dL (H)). Lab Results   Component Value Date    BUN 42 (H) 03/03/2023     Lab Results   Component Value Date    WBC 5.81 03/03/2023        Patient receives hemodialysis intermittently on Tuesdays, Thursdays, and Saturdays    Microbiology Results (last 7 days)       Procedure Component Value Units Date/Time    Blood culture #1 **CANNOT BE ORDERED STAT** [[id number]] Collected: 03/02/23 1015    Order Status: Completed Specimen: Blood from Peripheral, Antecubital, Right Updated: 03/03/23 1232     Blood Culture, Routine No Growth to date      No Growth to date    Blood culture #2 **CANNOT BE ORDERED STAT** [[id number]] Collected: 03/02/23 1030    Order Status: Completed Specimen: Blood from Peripheral, Antecubital, Left Updated: 03/03/23 1232     Blood Culture, Routine No Growth to date      No Growth to date    Culture, Respiratory with Gram Stain [[id number]]     Order Status: No result Specimen: Respiratory from Sputum, Expectorated              Assessment:  Vancomycin dose =  17 mg/kg  Renal function dependent on dialysis.  Vancomycin  elimination is dependent on dialysis.  No future vancomycin doses are warranted in next 24 hours due to poor renal function.  Vancomycin half-life is prolonged with low renal function.  Serum vancomycin levels will decrease slowly.  Vancomycin trough is above goal range.      Plan:  Vancomycin intermittent dosing.  Will obtain random vancomycin level with AM labs on 3/4    Pharmacy will continue to manage vancomycin therapy and adjust regimen as necessary.    Thank you for allowing us to participate in this patient's care.     Nancie Bhatia 3/3/2023 3:11 PM  Department of Pharmacy  Ext [phone]

## 2023-03-03 NOTE — HOSPITAL COURSE
[age]  female past medical history significant for end-stage renal disease, type 2 diabetes, DVT, seizure disorder, hypertension and sickle cell trait presents to the emergency room with complaints nausea vomiting.  A CT of the abdomen was performed revealing and intussusception she was also found to be severely hypoglycemic on arrival.  She was seen by General surgery who did not recommend any surgical intervention at this time it was recommended that if her symptoms persist she can of follow-up with GI.  She was seen today by the surgeon Dr. Bella headley who will advanced her diet to liquids and subsequent solid diabetic diet depending on her abdominal pain.  The patient was found to be hypoglycemic on arrival she was started on a D10 drip and required 3 amps of D50 with subsequent improvement in her blood sugar.  She is currently not on a D10 drip and her blood sugars have stabilized.  We will continue Accu-Cheks q.4 hours as instructed by Dr. Miles and she will be started on clear liquid diet with blood sugar monitoring closely.  She does have end-stage renal disease her last hemodialysis session was Tuesday.  Nephrology is consulted that will most likely perform hemodialysis today.  She remains in the ICU she is alert and cooperative and answers questions appropriately

## 2023-03-03 NOTE — PLAN OF CARE
Pt is doing well. Sitting up in bed, on RA. Tolerated clear liquid diet well. Updated on plan of care. Bed in lowest position and locked, bed alarm on, and call light in reach.  Problem: Adult Inpatient Plan of Care  Goal: Plan of Care Review  Outcome: Ongoing, Progressing  Goal: Patient-Specific Goal (Individualized)  Outcome: Ongoing, Progressing  Goal: Absence of Hospital-Acquired Illness or Injury  Outcome: Ongoing, Progressing  Goal: Optimal Comfort and Wellbeing  Outcome: Ongoing, Progressing  Goal: Readiness for Transition of Care  Outcome: Ongoing, Progressing     Problem: Diabetes Comorbidity  Goal: Blood Glucose Level Within Targeted Range  Outcome: Ongoing, Progressing     Problem: Adjustment to Illness (Sepsis/Septic Shock)  Goal: Optimal Coping  Outcome: Ongoing, Progressing     Problem: Bleeding (Sepsis/Septic Shock)  Goal: Absence of Bleeding  Outcome: Ongoing, Progressing     Problem: Glycemic Control Impaired (Sepsis/Septic Shock)  Goal: Blood Glucose Level Within Desired Range  Outcome: Ongoing, Progressing     Problem: Infection Progression (Sepsis/Septic Shock)  Goal: Absence of Infection Signs and Symptoms  Outcome: Ongoing, Progressing     Problem: Nutrition Impaired (Sepsis/Septic Shock)  Goal: Optimal Nutrition Intake  Outcome: Ongoing, Progressing     Problem: Infection  Goal: Absence of Infection Signs and Symptoms  Outcome: Ongoing, Progressing     Problem: Skin Injury Risk Increased  Goal: Skin Health and Integrity  Outcome: Ongoing, Progressing

## 2023-03-03 NOTE — ASSESSMENT & PLAN NOTE
Home medication Eliquis 5 milligram b.i.d. will hold for now  Continuous cardiac monitoring  Rate better controlled

## 2023-03-03 NOTE — PROGRESS NOTES
[facility] Medicine  Progress Note    Patient Name: Tabby Howard  MRN: [id number]  Patient Class: IP- Inpatient   Admission Date: 3/2/2023  Length of Stay: 1 days  Attending Physician: Tosin Roberts MD  Primary Care Provider: Primary Doctor No        Subjective:     Principal Problem:Severe sepsis        HPI:  Patient is a 33-year-old  female with history of end-stage renal disease on dialysis Tuesday/Thursday/Saturday, sickle cell trait, hypertension, CHF EF 55, SVT, diabetes type 2, gastroparesis, seizure disorder.  Presents to the ED with complaints of nausea, multiple episodes of vomiting, mid abdominal pain started yesterday.  Well assessed in the emergency department initial glucose 141 however became hypoglycemic 20,28,20. She was started on a D10 drip and required 3 amps D50 with no long-term improvement.  CT abdomen pending.  On assessment patient is very drowsy due to hypoglycemia and difficult receive detailed information.  He reports no fever arrival, hematuria, chest pain, dyspnea, chills.    ED physician spoke surgeon with Dr. Marcos who will see patient.  Patient will be admitted ICU for sepsis, intussusception, severe hypoglycemia.    CT abdomen and pelvis results.      IMPRESSION:     1.  Small focus of intussusception involving the proximal jejunum without evidence of obstruction. This most likely reflects transient intussusception. Clinical correlation recommended.  2.  Mild free fluid in the pelvic cul-de-sac and left pericolic gutter of undetermined significance.  3.  Additional incidental observations as described.  Patient is a critical care patient      Patient is a critical care patient >r 30 minutes spent caring for critical care patient.      Overview/Hospital Course:  [age]  female past medical history significant for end-stage renal disease, type 2 diabetes, DVT, seizure disorder, hypertension and sickle cell trait  presents to the emergency room with complaints nausea vomiting.  A CT of the abdomen was performed revealing and intussusception she was also found to be severely hypoglycemic on arrival.  She was seen by General surgery who did not recommend any surgical intervention at this time it was recommended that if her symptoms persist she can of follow-up with GI.  She was seen today by the surgeon Dr. Bella headley who will advanced her diet to liquids and subsequent solid diabetic diet depending on her abdominal pain.  The patient was found to be hypoglycemic on arrival she was started on a D10 drip and required 3 amps of D50 with subsequent improvement in her blood sugar.  She is currently not on a D10 drip and her blood sugars have stabilized.  We will continue Accu-Cheks q.4 hours as instructed by Dr. Miles and she will be started on clear liquid diet with blood sugar monitoring closely.  She does have end-stage renal disease her last hemodialysis session was Tuesday.  Nephrology is consulted that will most likely perform hemodialysis today.  She remains in the ICU she is alert and cooperative and answers questions appropriately      Interval History:   03/03/23: still with abdomen pain. Requesting to eat. No BM but passing gas. Abdomen is tender to palpate right and mid upper quad. Blood sugars better controlled no further hypoglycemic events. Will continue glucose checks q4hr. Am labs surgery following no surgical intervention recommended at this time. Afebrile  Nephrology following possible HD today.           Review of Systems  Objective:     Vital Signs (Most Recent):  Temp: 98.4 °F (36.9 °C) (03/03/23 0700)  Pulse: 82 (03/03/23 1000)  Resp: 13 (03/03/23 1000)  BP: (!) 157/95 (03/03/23 1000)  SpO2: (!) 94 % (03/03/23 1000)   Vital Signs (24h Range):  Temp:  [93 °F (33.9 °C)-98.5 °F (36.9 °C)] 98.4 °F (36.9 °C)  Pulse:  [68-89] 82  Resp:  [11-43] 13  SpO2:  [89 %-100 %] 94 %  BP: (138-195)/() 157/95      Weight: 58 kg (127 lb 13.9 oz)  Body mass index is 23.39 kg/m².    Intake/Output Summary (Last 24 hours) at 3/3/2023 1023  Last data filed at 3/2/2023 1854  Gross per 24 hour   Intake 1716.67 ml   Output 0 ml   Net 1716.67 ml      Physical Exam  Vitals and nursing note reviewed.   Constitutional:       Comments: Tired will awake and answer questions appropriately    HENT:      Head: Normocephalic and atraumatic.      Nose: Nose normal.      Mouth/Throat:      Mouth: Mucous membranes are dry.   Eyes:      Pupils: Pupils are equal, round, and reactive to light.   Cardiovascular:      Rate and Rhythm: Normal rate and regular rhythm.      Pulses: Normal pulses.   Pulmonary:      Effort: Pulmonary effort is normal.      Breath sounds: Normal breath sounds.   Abdominal:      General: Bowel sounds are normal.      Tenderness: There is abdominal tenderness.      Comments: Tenderness to right and mid upper abdomen with palpation   Musculoskeletal:         General: Normal range of motion.      Cervical back: Neck supple.   Skin:     General: Skin is warm and dry.   Neurological:      General: No focal deficit present.      Mental Status: She is oriented to person, place, and time.       Significant Labs: All pertinent labs within the past 24 hours have been reviewed.  A1C:   Recent Labs   Lab 12/23/22  1413   HGBA1C 7.5*     ABGs:   Recent Labs   Lab 03/02/23  1225 03/02/23  1535   PH 7.377 7.383   PCO2 54.8* 50.3*   HCO3 32.2* 30.0*   POCSATURATED 98 98   BE 7 5   PO2 113* 104*     Bilirubin:   Recent Labs   Lab 02/06/23  1951 02/07/23  0740 02/08/23  0505 03/02/23  0919 03/03/23  0547   BILITOT 2.4* 2.8* 1.8* 3.5* 2.2*     Blood Culture:   Recent Labs   Lab 03/02/23  1015 03/02/23  1030   LABBLOO No Growth to date No Growth to date     BMP:   Recent Labs   Lab 03/02/23  0919 03/02/23  1314 03/03/23  0547   *   < > 199*      < > 136   K 3.7   < > 4.0   CL 98   < > 96   CO2 26   < > 28   BUN 35*   < > 42*    CREATININE 5.0*   < > 5.8*   CALCIUM 8.6*   < > 7.5*   MG 2.0  --   --     < > = values in this interval not displayed.     CBC:   Recent Labs   Lab 03/02/23  0919 03/02/23  1535 03/03/23  0547   WBC 8.00  --  5.81   HGB 7.7*  --  7.6*   HCT 23.6* 25* 23.4*   *  --  151     CMP:   Recent Labs   Lab 03/02/23  0919 03/02/23  1314 03/03/23  0105 03/03/23  0547     --  137 136   K 3.7  --  3.9 4.0   CL 98  --  96 96   CO2 26  --  28 28   * 144* 172* 199*   BUN 35*  --  41* 42*   CREATININE 5.0*  --  5.6* 5.8*   CALCIUM 8.6*  --  6.9* 7.5*   PROT 7.7  --   --  5.8*   ALBUMIN 3.3*  --   --  2.6*   BILITOT 3.5*  --   --  2.2*   ALKPHOS 415*  --   --  338*   AST 92*  --   --  60*   *  --   --  132*   ANIONGAP 16  --  13 12     Cardiac Markers:   Recent Labs   Lab 03/02/23 0919   BNP >4,500*     Coagulation: No results for input(s): PT, INR, APTT in the last 48 hours.  Lactic Acid:   Recent Labs   Lab 03/02/23  1341   LACTATE 1.4     Lipase:   Recent Labs   Lab 03/02/23  0919   LIPASE 35     Lipid Panel: No results for input(s): CHOL, HDL, LDLCALC, TRIG, CHOLHDL in the last 48 hours.  Magnesium:   Recent Labs   Lab 03/02/23  0919   MG 2.0     Pathology Results  (Last 10 years)                 12/05/22 1038  Specimen to Pathology, Surgery Gastrointestinal tract Final result    Narrative:  Pre-op Diagnosis: epigastric pain, intractable abdominal pain   Procedure(s):   EGD (ESOPHAGOGASTRODUODENOSCOPY)   Number of specimens:1   Name of specimens: 1. Antrum/body bx- gastritis   Which provider would you like to cc?->KOKO YUSUF   Release to patient->Immediate   Specimen total (fresh, frozen, permanent):->1       07/30/22 1127  Specimen to Pathology, Surgery General Surgery Final result    Narrative:  Pre-op Diagnosis: Epigastric pain [R10.13]   Procedure(s):   CHOLECYSTECTOMY, LAPAROSCOPIC   Number of specimens: 1   Name of specimens: gallbladder   Which provider would you like to cc?->PINO  MARIBEL MO   Release to patient->Immediate   Specimen total (fresh, frozen, permanent):->1             POCT Glucose: No results for input(s): POCTGLUCOSE in the last 48 hours.  Prealbumin: No results for input(s): PREALBUMIN in the last 48 hours.  Respiratory Culture: No results for input(s): GSRESP, RESPIRATORYC in the last 48 hours.  Troponin:   Recent Labs   Lab 03/02/23  0919   TROPONINIHS 28.3*     TSH:   Recent Labs   Lab 02/08/23  0505   TSH 1.141     Urine Culture: No results for input(s): LABURIN in the last 48 hours.  Urine Studies: No results for input(s): COLORU, APPEARANCEUA, PHUR, SPECGRAV, PROTEINUA, GLUCUA, KETONESU, BILIRUBINUA, OCCULTUA, NITRITE, UROBILINOGEN, LEUKOCYTESUR, RBCUA, WBCUA, BACTERIA, SQUAMEPITHEL, HYALINECASTS in the last 48 hours.    Invalid input(s): WRIGHTSUR    Significant Imaging: I have reviewed all pertinent imaging results/findings within the past 24 hours.  I have reviewed and interpreted all pertinent imaging results/findings within the past 24 hours.      Assessment/Plan:      * Severe sepsis  This patient does have evidence of infective focus  My overall impression is sepsis.  Source: Abdominal  Antibiotics given-   Antibiotics (72h ago, onward)    Start     Stop Route Frequency Ordered    03/03/23 1000  mupirocin 2 % ointment  (MRSA Decolonization Orders [facility])         03/08 0859 Nasl 2 times daily 03/03/23 0848    03/02/23 1415  vancomycin - pharmacy to dose  ( Pneumonia - Moderate-High MDR )        See Hyperspace for full Linked Orders Report.    -- IV pharmacy to manage frequency 03/02/23 1320        Latest lactate reviewed-  Recent Labs   Lab 03/02/23  1341   LACTATE 1.4       Fluid challenge Contraindicated- Fluid bolus is contraindicated in this patient due to End Stage Renal Disease     Post- resuscitation assessment No - Post resuscitation assessment not needed       Will Not start Pressors- Levophed for MAP of 65    Initially started on IV cefepime and IV  vancomycin ED  MRSA, blood cultures, procalcitonin.  Today history of end-stage renal disease on dialysis did not order lactic or CRP may give false elevated reading  Will continue IV cefepime for possible pneumonia  CBC, CMP  Keep O2 greater than 90%      Intussusception intestine  Advance diet to liquids as recommended by surgery  IV Pain medication prn  Seen by surgery Dr. Thurman  No surgical intervention planned at this time      Congestive heart failure with left ventricular diastolic dysfunction  Patient is identified as having Diastolic (HFpEF) heart failure that is Chronic. CHF is currently controlled. Latest ECHO performed and demonstrates- Results for orders placed during the hospital encounter of 01/12/23    Echo    Interpretation Summary  · The left ventricle is normal in size with moderate concentric hypertrophy and normal systolic function.  · The estimated ejection fraction is 55%.  · Grade III left ventricular diastolic dysfunction.  · Normal right ventricular size with normal right ventricular systolic function.  · Moderate left atrial enlargement.  · Moderate to severe tricuspid regurgitation.  · Mild to moderate pulmonic regurgitation.  · Mild mitral regurgitation.  · Normal central venous pressure (3 mmHg).  · The estimated PA systolic pressure is 56 mmHg.  · There is pulmonary hypertension.  · Moderate to large circumferential pericardial effusion. No evidence of tamponade.  . Continue ACE/ARB and monitor clinical status closely. Monitor on telemetry. Patient is on CHF pathway.  Monitor strict Is&Os and daily weights.  Place on fluid restriction of none. Continue to stress to patient importance of self efficacy and  on diet for CHF. Last BNP reviewed- and noted below   Recent Labs   Lab 03/02/23  0919   BNP >4,500*   .  Patient will need hemodialysis today  Continue home medication once taking p.o. medications  Strict I&Os/daily weights    Severe diabetic hypoglycemia  Patient's FSGs  are uncontrolled due to hypoglycemia on current medication regimen.  Last A1c reviewed-   Lab Results   Component Value Date    HGBA1C 7.5 (H) 12/23/2022     Most recent fingerstick glucose reviewed- No results for input(s): POCTGLUCOSE in the last 24 hours.  Current correctional scale  none  none anti-hyperglycemic dose as follows-   Antihyperglycemics (From admission, onward)    Start     Stop Route Frequency Ordered    03/03/23 0025  insulin aspart U-100 pen 0-5 Units         -- SubQ Every 6 hours PRN 03/02/23 2328        History of diabetes type 2 hold home medications  q4hr  glucose monitoring  Hypoglycemia protocol  D10 125 mL an hour on hold for now  IM glucagon 1 mg p.r.n. if glucose remains <30  Hold Oral hypoglycemics while patient is in the hospital.    Anemia of chronic kidney failure, stage 5  Initial hemoglobin 7.7, hematocrit 23.6   No active bleeding  Repeat CBC in a.m.      Deep vein thrombosis (DVT) of non-extremity vein  Eliquis on Hold for now      ESRD (end stage renal disease) on dialysis  Dialysis Tuesday/Thursday/Saturday  Consult nephrology  Possible HD today    Seizure disorder  Start IV Keppra 500 mg bid for now; and change to po medication Keppra p.o. 500 BID once able to take by mouth  Seizure precautions        SVT (supraventricular tachycardia)  Home medication Eliquis 5 milligram b.i.d. will hold for now  Continuous cardiac monitoring  Rate better controlled        VTE Risk Mitigation (From admission, onward)         Ordered     IP VTE HIGH RISK PATIENT  Once         03/02/23 1320     Place sequential compression device  Until discontinued         03/02/23 1320                Discharge Planning   TATIANA: 3/5/2023     Code Status: Full Code   Is the patient medically ready for discharge?:     Reason for patient still in hospital (select all that apply): Patient trending condition, Laboratory test, Treatment and Consult recommendations  Discharge Plan A: Home with family                   Dilia Maldonado NP  Department of Hospital Medicine   [facility]

## 2023-03-03 NOTE — CARE UPDATE
Patient was initially admitted for hypoglycemia  Appropriate placed on D10  Patient is doing well  Most recent point of care glucose was 213  D10 drip stopped  Will repeat BMP for verification of blood sugar  Blood sugar results have been labile  Consider possible malfunction of plan of care glucometer  Monitor closely  Continue Accu-Cheks as ordered

## 2023-03-03 NOTE — PLAN OF CARE
Patient remains drowsy, however participates more in exam/conversation. NSR on monitor. BP elevated--PRN hydralazine given. NPO. Temperature has improved with use of warming blanket--patient is now normothermic. Glucose stable during first 4 hours of admit to ICU--patient is now q2hr glucose monitoring per order. D10 @125cc/hr maintained. PIV x2 and hemodialysis catheter present. SCDs in place.     POC reviewed with the patient and she verbalized understanding. All comments and concerns addressed. Bed locked in lowest position with bed alarm set, call light within reach. Safety precautions maintained.

## 2023-03-04 LAB
ALBUMIN SERPL BCP-MCNC: 2.6 G/DL (ref 3.5–5.2)
ALP SERPL-CCNC: 348 U/L (ref 55–135)
ALT SERPL W/O P-5'-P-CCNC: 118 U/L (ref 10–44)
ANION GAP SERPL CALC-SCNC: 14 MMOL/L (ref 8–16)
AST SERPL-CCNC: 63 U/L (ref 10–40)
BASOPHILS # BLD AUTO: 0.01 K/UL (ref 0–0.2)
BASOPHILS NFR BLD: 0.2 % (ref 0–1.9)
BILIRUB SERPL-MCNC: 2.4 MG/DL (ref 0.1–1)
BUN SERPL-MCNC: 49 MG/DL (ref 6–20)
CALCIUM SERPL-MCNC: 7.3 MG/DL (ref 8.7–10.5)
CHLORIDE SERPL-SCNC: 98 MMOL/L (ref 95–110)
CO2 SERPL-SCNC: 26 MMOL/L (ref 23–29)
CREAT SERPL-MCNC: 6.6 MG/DL (ref 0.5–1.4)
DIFFERENTIAL METHOD: ABNORMAL
EOSINOPHIL # BLD AUTO: 0.2 K/UL (ref 0–0.5)
EOSINOPHIL NFR BLD: 3.1 % (ref 0–8)
ERYTHROCYTE [DISTWIDTH] IN BLOOD BY AUTOMATED COUNT: 19.5 % (ref 11.5–14.5)
EST. GFR  (NO RACE VARIABLE): 7.9 ML/MIN/1.73 M^2
GLUCOSE SERPL-MCNC: 126 MG/DL (ref 70–110)
GLUCOSE SERPL-MCNC: 136 MG/DL (ref 70–110)
GLUCOSE SERPL-MCNC: 140 MG/DL (ref 70–110)
GLUCOSE SERPL-MCNC: 157 MG/DL (ref 70–110)
GLUCOSE SERPL-MCNC: 175 MG/DL (ref 70–110)
GLUCOSE SERPL-MCNC: 207 MG/DL (ref 70–110)
GLUCOSE SERPL-MCNC: 235 MG/DL (ref 70–110)
HCT VFR BLD AUTO: 21.8 % (ref 37–48.5)
HGB BLD-MCNC: 7.2 G/DL (ref 12–16)
IMM GRANULOCYTES # BLD AUTO: 0.04 K/UL (ref 0–0.04)
IMM GRANULOCYTES NFR BLD AUTO: 0.8 % (ref 0–0.5)
LYMPHOCYTES # BLD AUTO: 0.8 K/UL (ref 1–4.8)
LYMPHOCYTES NFR BLD: 15.6 % (ref 18–48)
MCH RBC QN AUTO: 29.8 PG (ref 27–31)
MCHC RBC AUTO-ENTMCNC: 33 G/DL (ref 32–36)
MCV RBC AUTO: 90 FL (ref 82–98)
MONOCYTES # BLD AUTO: 0.4 K/UL (ref 0.3–1)
MONOCYTES NFR BLD: 8 % (ref 4–15)
NEUTROPHILS # BLD AUTO: 3.5 K/UL (ref 1.8–7.7)
NEUTROPHILS NFR BLD: 72.3 % (ref 38–73)
NRBC BLD-RTO: 0 /100 WBC
PLATELET # BLD AUTO: 122 K/UL (ref 150–450)
PMV BLD AUTO: 9.4 FL (ref 9.2–12.9)
POTASSIUM SERPL-SCNC: 4.2 MMOL/L (ref 3.5–5.1)
PROT SERPL-MCNC: 5.9 G/DL (ref 6–8.4)
RBC # BLD AUTO: 2.42 M/UL (ref 4–5.4)
SODIUM SERPL-SCNC: 138 MMOL/L (ref 136–145)
VANCOMYCIN SERPL-MCNC: 16.9 UG/ML
WBC # BLD AUTO: 4.86 K/UL (ref 3.9–12.7)

## 2023-03-04 PROCEDURE — 99900031 HC PATIENT EDUCATION (STAT)

## 2023-03-04 PROCEDURE — 63600175 PHARM REV CODE 636 W HCPCS: Performed by: INTERNAL MEDICINE

## 2023-03-04 PROCEDURE — 36415 COLL VENOUS BLD VENIPUNCTURE: CPT | Performed by: INTERNAL MEDICINE

## 2023-03-04 PROCEDURE — 99900035 HC TECH TIME PER 15 MIN (STAT)

## 2023-03-04 PROCEDURE — 99232 SBSQ HOSP IP/OBS MODERATE 35: CPT | Mod: ,,, | Performed by: INTERNAL MEDICINE

## 2023-03-04 PROCEDURE — 90935 HEMODIALYSIS ONE EVALUATION: CPT

## 2023-03-04 PROCEDURE — 80053 COMPREHEN METABOLIC PANEL: CPT | Performed by: NURSE PRACTITIONER

## 2023-03-04 PROCEDURE — 12000002 HC ACUTE/MED SURGE SEMI-PRIVATE ROOM

## 2023-03-04 PROCEDURE — 94761 N-INVAS EAR/PLS OXIMETRY MLT: CPT

## 2023-03-04 PROCEDURE — 94799 UNLISTED PULMONARY SVC/PX: CPT

## 2023-03-04 PROCEDURE — 80202 ASSAY OF VANCOMYCIN: CPT | Performed by: INTERNAL MEDICINE

## 2023-03-04 PROCEDURE — 63600175 PHARM REV CODE 636 W HCPCS: Mod: JZ,JG | Performed by: INTERNAL MEDICINE

## 2023-03-04 PROCEDURE — 25000003 PHARM REV CODE 250

## 2023-03-04 PROCEDURE — 27000221 HC OXYGEN, UP TO 24 HOURS

## 2023-03-04 PROCEDURE — 82962 GLUCOSE BLOOD TEST: CPT

## 2023-03-04 PROCEDURE — 85025 COMPLETE CBC W/AUTO DIFF WBC: CPT | Performed by: NURSE PRACTITIONER

## 2023-03-04 PROCEDURE — 25000003 PHARM REV CODE 250: Performed by: INTERNAL MEDICINE

## 2023-03-04 PROCEDURE — C9113 INJ PANTOPRAZOLE SODIUM, VIA: HCPCS | Performed by: NURSE PRACTITIONER

## 2023-03-04 PROCEDURE — 99232 PR SUBSEQUENT HOSPITAL CARE,LEVL II: ICD-10-PCS | Mod: ,,, | Performed by: INTERNAL MEDICINE

## 2023-03-04 PROCEDURE — 63600175 PHARM REV CODE 636 W HCPCS: Performed by: NURSE PRACTITIONER

## 2023-03-04 RX ORDER — HYDRALAZINE HYDROCHLORIDE 25 MG/1
100 TABLET, FILM COATED ORAL 2 TIMES DAILY
Status: DISCONTINUED | OUTPATIENT
Start: 2023-03-04 | End: 2023-03-06 | Stop reason: HOSPADM

## 2023-03-04 RX ORDER — CLONIDINE 0.2 MG/24H
1 PATCH, EXTENDED RELEASE TRANSDERMAL WEEKLY
Status: DISCONTINUED | OUTPATIENT
Start: 2023-03-04 | End: 2023-03-06 | Stop reason: HOSPADM

## 2023-03-04 RX ORDER — CARVEDILOL 25 MG/1
25 TABLET ORAL 2 TIMES DAILY
Status: DISCONTINUED | OUTPATIENT
Start: 2023-03-04 | End: 2023-03-06 | Stop reason: HOSPADM

## 2023-03-04 RX ORDER — LOSARTAN POTASSIUM 50 MG/1
100 TABLET ORAL DAILY
Status: DISCONTINUED | OUTPATIENT
Start: 2023-03-05 | End: 2023-03-06 | Stop reason: HOSPADM

## 2023-03-04 RX ORDER — AMLODIPINE BESYLATE 5 MG/1
10 TABLET ORAL DAILY
Status: DISCONTINUED | OUTPATIENT
Start: 2023-03-05 | End: 2023-03-06 | Stop reason: HOSPADM

## 2023-03-04 RX ORDER — HYDROCHLOROTHIAZIDE 12.5 MG/1
12.5 TABLET ORAL DAILY
Status: DISCONTINUED | OUTPATIENT
Start: 2023-03-05 | End: 2023-03-06 | Stop reason: HOSPADM

## 2023-03-04 RX ORDER — SODIUM CHLORIDE 9 MG/ML
INJECTION, SOLUTION INTRAVENOUS ONCE
Status: DISCONTINUED | OUTPATIENT
Start: 2023-03-04 | End: 2023-03-06 | Stop reason: HOSPADM

## 2023-03-04 RX ORDER — LOSARTAN POTASSIUM AND HYDROCHLOROTHIAZIDE 12.5; 1 MG/1; MG/1
1 TABLET ORAL DAILY
Status: DISCONTINUED | OUTPATIENT
Start: 2023-03-05 | End: 2023-03-04

## 2023-03-04 RX ADMIN — MORPHINE SULFATE 2 MG: 2 INJECTION, SOLUTION INTRAMUSCULAR; INTRAVENOUS at 05:03

## 2023-03-04 RX ADMIN — INSULIN ASPART 2 UNITS: 100 INJECTION, SOLUTION INTRAVENOUS; SUBCUTANEOUS at 08:03

## 2023-03-04 RX ADMIN — CARVEDILOL 25 MG: 25 TABLET, FILM COATED ORAL at 08:03

## 2023-03-04 RX ADMIN — CHLORHEXIDINE GLUCONATE 15 ML: 1.2 RINSE ORAL at 08:03

## 2023-03-04 RX ADMIN — EPOETIN ALFA-EPBX 10000 UNITS: 10000 INJECTION, SOLUTION INTRAVENOUS; SUBCUTANEOUS at 08:03

## 2023-03-04 RX ADMIN — MORPHINE SULFATE 2 MG: 2 INJECTION, SOLUTION INTRAMUSCULAR; INTRAVENOUS at 06:03

## 2023-03-04 RX ADMIN — CLONIDINE 1 PATCH: 0.2 PATCH TRANSDERMAL at 05:03

## 2023-03-04 RX ADMIN — PANTOPRAZOLE SODIUM 40 MG: 40 INJECTION, POWDER, LYOPHILIZED, FOR SOLUTION INTRAVENOUS at 08:03

## 2023-03-04 RX ADMIN — HYDRALAZINE HYDROCHLORIDE 10 MG: 20 INJECTION INTRAMUSCULAR; INTRAVENOUS at 06:03

## 2023-03-04 RX ADMIN — LEVETIRACETAM 500 MG: 5 INJECTION INTRAVENOUS at 08:03

## 2023-03-04 RX ADMIN — MUPIROCIN 1 G: 20 OINTMENT TOPICAL at 08:03

## 2023-03-04 RX ADMIN — HYDRALAZINE HYDROCHLORIDE 10 MG: 20 INJECTION INTRAMUSCULAR; INTRAVENOUS at 11:03

## 2023-03-04 RX ADMIN — HYDRALAZINE HYDROCHLORIDE 10 MG: 20 INJECTION INTRAMUSCULAR; INTRAVENOUS at 12:03

## 2023-03-04 RX ADMIN — INSULIN ASPART 2 UNITS: 100 INJECTION, SOLUTION INTRAVENOUS; SUBCUTANEOUS at 04:03

## 2023-03-04 RX ADMIN — MORPHINE SULFATE 2 MG: 2 INJECTION, SOLUTION INTRAMUSCULAR; INTRAVENOUS at 02:03

## 2023-03-04 RX ADMIN — HYDRALAZINE HYDROCHLORIDE 100 MG: 25 TABLET, FILM COATED ORAL at 08:03

## 2023-03-04 NOTE — PROGRESS NOTES
Pulmonary/Critical Care Progress Note      PATIENT NAME: Tabby Howard  MRN: [id number]  TODAY'S DATE: 2023  3:57 PM  ADMIT DATE: 3/2/2023  AGE: 33 y.o. : 1990    CONSULT REQUESTED BY: Xiang Vasquez MD    REASON FOR CONSULT:   Critical care management    HPI:  The patient is a 33-year-old dialysis patient who presented to the hospital with nausea vomiting and abdominal pain.  She was found to be severely hypoglycemic and is requiring a D10 drip.  She has also been found to have an intussusception which is being medically managed at this time.  The patient is lethargic but has normal ABGs.    3/3 the patient is not having any further nausea and vomiting.  Her hypoglycemia has resolved.  A erect and flat KUB have been ordered.  She is saturating 100% on room air.    REVIEW OF SYSTEMS    General: Feeling okay but tired  Eyes: Vision is good.  ENT:  No sinusitis or pharyngitis.   Heart: No chest pain or palpitations.  Lungs: No cough, sputum, or wheezing.  GI:  Mild abdominal pain  : No dysuria, hesitancy, or nocturia.  Skin: No lesions or rashes.  Musculoskeletal: No joint pain or myalgias.  Neuro: No headaches or neuropathy.  Lymph: No edema or adenopathy.  Psych: No anxiety or depression.  Endo: No weight change.      ALLERGIES  Review of patient's allergies indicates:   Allergen Reactions    Penicillins Hives       INPATIENT SCHEDULED MEDICATIONS   sodium chloride 0.9%   Intravenous Once    chlorhexidine  15 mL Mouth/Throat BID    epoetin teresa-epbx  10,000 Units Subcutaneous Every Tues, Thurs, Sat    levetiracetam IV  500 mg Intravenous Q12H    mupirocin   Nasal BID    pantoprazole  40 mg Intravenous Daily    sodium chloride 0.9%  30 mL/kg Intravenous Once      dextrose 10 % in water (D10W) Stopped (23 2320)       MEDICAL AND SURGICAL HISTORY  Past Medical History:   Diagnosis Date    CKD (chronic kidney disease), stage IV 2022    Diabetes mellitus due to underlying condition with  unspecified complications 2022    Gastroparesis 2022    Heart failure with preserved ejection fraction 2022    EF 55% on 3/22    History of gastroesophageal reflux (GERD)     History of supraventricular tachycardia     Hyperkalemia 2022    Hypertensive emergency 2022    Sickle cell trait 2022    Type 2 diabetes mellitus      Past Surgical History:   Procedure Laterality Date     SECTION      x 3    COLONOSCOPY      COLONOSCOPY N/A 2022    Procedure: COLONOSCOPY;  Surgeon: Jagdeep Cedeno MD;  Location: [facility];  Service: Endoscopy;  Laterality: N/A;    ESOPHAGOGASTRODUODENOSCOPY N/A 10/18/2019    Procedure: ESOPHAGOGASTRODUODENOSCOPY (EGD);  Surgeon: Gianluca Mendez MD;  Location: [facility];  Service: Endoscopy;  Laterality: N/A;    ESOPHAGOGASTRODUODENOSCOPY N/A 2022    Procedure: EGD (ESOPHAGOGASTRODUODENOSCOPY);  Surgeon: Micky Paredes III, MD;  Location: [facility];  Service: Endoscopy;  Laterality: N/A;    ESOPHAGOGASTRODUODENOSCOPY N/A 2022    Procedure: EGD (ESOPHAGOGASTRODUODENOSCOPY);  Surgeon: Marcelo Zhong MD;  Location: [facility];  Service: Endoscopy;  Laterality: N/A;    LAPAROSCOPIC CHOLECYSTECTOMY N/A 2022    Procedure: CHOLECYSTECTOMY, LAPAROSCOPIC;  Surgeon: Grey Perez MD;  [facility];  Service: General;  Laterality: N/A;    PLACEMENT OF DUAL-LUMEN VASCULAR CATHETER Left 2022    Procedure: INSERTION-CATHETER-JOSEPH;  Surgeon: Dionte Gan MD;  [facility];  Service: General;  Laterality: Left;    PLACEMENT OF DUAL-LUMEN VASCULAR CATHETER Right 2022    Procedure: INSERTION-CATHETER-Hemosplit;  Surgeon: Dionte Gan MD;  Location: [facility];  Service: General;  Laterality: Right;       ALCOHOL, TOBACCO AND DRUG USE  Social History     Tobacco Use   Smoking Status Never   Smokeless Tobacco Never     Social History     Substance and Sexual Activity   Alcohol Use Not Currently     Social History      Substance and Sexual Activity   Drug Use No       FAMILY HISTORY  Family History   Problem Relation Age of Onset    Diabetes Mother     Diabetes Father        VITAL SIGNS (MOST RECENT)  Temp: 97 °F (36.1 °C) (03/04/23 1030)  Pulse: 79 (03/04/23 1100)  Resp: 12 (03/04/23 1100)  BP: (!) 187/112 (03/04/23 1100)  SpO2: 96 % (03/04/23 1100)    INTAKE AND OUTPUT (LAST 24 HOURS):  Intake/Output Summary (Last 24 hours) at 3/4/2023 1108  Last data filed at 3/4/2023 0830  Gross per 24 hour   Intake 800 ml   Output 350 ml   Net 450 ml       WEIGHT  Wt Readings from Last 1 Encounters:   03/02/23 58 kg (127 lb 13.9 oz)       PHYSICAL EXAM  GENERAL:  Mid aged patient, in no distress, sleeping when I walked in but awakens and stays awake today  HEENT: Pupils equal and reactive.  Heliotrope rash. Extraocular movements intact. Nose intact. Pharynx moist.  NECK: Supple.   HEART: Regular rate and rhythm. No murmur or gallop auscultated.  LUNGS: Clear to auscultation and percussion. Lung excursion symmetrical. No change in fremitus. No adventitial noises.  There is a hemo split in the right IJ  ABDOMEN: Bowel sounds present.  Mildly tender over lower abdomen, no masses palpated.  : Normal anatomy.  EXTREMITIES: Normal muscle tone and joint movement, no cyanosis or clubbing.   LYMPHATICS: No adenopathy palpated, no edema.  SKIN: Dry, intact, no lesions.   NEURO: Cranial nerves II-XII intact. Motor strength 5/5 bilaterally, upper and lower extremities.    PSYCH:  Quiet        CBC LAST (LAST 24 HOURS)  Recent Labs   Lab 03/04/23  0407   WBC 4.86   RBC 2.42*   HGB 7.2*   HCT 21.8*   MCV 90   MCH 29.8   MCHC 33.0   RDW 19.5*   *   MPV 9.4   GRAN 72.3  3.5   LYMPH 15.6*  0.8*   MONO 8.0  0.4   BASO 0.01   NRBC 0       CHEMISTRY LAST (LAST 24 HOURS)  Recent Labs   Lab 03/04/23  0407      K 4.2   CL 98   CO2 26   ANIONGAP 14   BUN 49*   CREATININE 6.6*   *   CALCIUM 7.3*   ALBUMIN 2.6*   PROT 5.9*   ALKPHOS 348*    *   AST 63*   BILITOT 2.4*         CARDIAC PROFILE (LAST 24 HOURS)  Recent Labs   Lab 03/02/23  0919   BNP >4,500*   TROPONINIHS 28.3*       LAST 7 DAYS MICROBIOLOGY   Microbiology Results (last 7 days)       Procedure Component Value Units Date/Time    Blood culture #1 **CANNOT BE ORDERED STAT** [[id number]] Collected: 03/02/23 1015    Order Status: Completed Specimen: Blood from Peripheral, Antecubital, Right Updated: 03/03/23 1232     Blood Culture, Routine No Growth to date      No Growth to date    Blood culture #2 **CANNOT BE ORDERED STAT** [[id number]] Collected: 03/02/23 1030    Order Status: Completed Specimen: Blood from Peripheral, Antecubital, Left Updated: 03/03/23 1232     Blood Culture, Routine No Growth to date      No Growth to date    Culture, Respiratory with Gram Stain [[id number]]     Order Status: No result Specimen: Respiratory from Sputum, Expectorated             MOST RECENT IMAGING  X-Ray Abdomen Flat And Erect  Supine and upright views of the abdomen    Clinical history is vomiting    There is a normal bowel gas pattern. No organomegaly or abnormal soft tissue mass. There is no pneumoperitoneum. The lung bases are clear. There is vascular calcification of the splenic artery. There are surgical clips in the right upper quadrant. There is contrast within the bladder from recent CT.    IMPRESSION: Unremarkable bowel gas pattern    Electronically signed by:  Rae Solano MD  3/3/2023 12:04 PM CST Workstation: [id number]  X-Ray Chest PA And Lateral  HISTORY: Pneumonia?    FINDINGS: PA and lateral chest radiograph at 1044 hours compared to prior exams including 03/02/2023 show right internal jugular central venous catheter unchanged in position, with stable enlarged cardiac silhouette and normal pulmonary vascularity.    The lungs are normally expanded, with no consolidation or pleural effusion. The previous scattered airspace opacities and groundglass densities seen on CT of the  prior day are not well visualized on this exam. There is a tiny right pleural effusion, with no pneumothorax.    IMPRESSION: Please see findings.    Electronically signed by:  Daniel Edmondson MD  3/3/2023 11:43 AM [facility] Workstation: [id number]      CURRENT VISIT EKG  Results for orders placed or performed during the hospital encounter of 03/02/23   EKG 12-lead    Narrative    Test Reason : R11.10,    Vent. Rate : 089 BPM     Atrial Rate : 089 BPM     P-R Int : 176 ms          QRS Dur : 080 ms      QT Int : 418 ms       P-R-T Axes : 037 -17 014 degrees     QTc Int : 508 ms    Normal sinus rhythm  Possible Left atrial enlargement  Prolonged QT  Abnormal ECG  When compared with ECG of 07-FEB-2023 05:36,  Questionable change in The axis    Referred By:             Confirmed By:        ECHOCARDIOGRAM RESULTS  Results for orders placed during the hospital encounter of 01/12/23    Echo    Interpretation Summary  · The left ventricle is normal in size with moderate concentric hypertrophy and normal systolic function.  · The estimated ejection fraction is 55%.  · Grade III left ventricular diastolic dysfunction.  · Normal right ventricular size with normal right ventricular systolic function.  · Moderate left atrial enlargement.  · Moderate to severe tricuspid regurgitation.  · Mild to moderate pulmonic regurgitation.  · Mild mitral regurgitation.  · Normal central venous pressure (3 mmHg).  · The estimated PA systolic pressure is 56 mmHg.  · There is pulmonary hypertension.  · Moderate to large circumferential pericardial effusion. No evidence of tamponade.        Oxygen INFORMATION   Room air       LAST ARTERIAL BLOOD GAS  ABG  Recent Labs   Lab 03/02/23  1535   PH 7.383   PO2 104*   PCO2 50.3*   HCO3 30.0*   BE 5       IMPRESSION AND PLAN  Chest x-ray is clear  Chronic hypercapnic respiratory failure not hypoxemic  Volume overload being dialyzed  Hypoglycemia, resolved  Intersussusception, appears resolved  Anemia, a little  worse  End-stage renal disease  Hyperglycemia  Transaminitis and hyperbilirubinemia, improving  Pericardial effusion   Pulmonary hypertension, etiology unclear  Hypertension      Patient is clear to move out of ICU   Patient needs her oral antihypertensives restarted   Please call if I can be of assistance  Discussed with nursing, Respiratory, and hospitalist        Iveth Berman MD  [facility]  Department of Pulmonology  Date of Service: 03/04/2023  3:57 PM

## 2023-03-04 NOTE — PROGRESS NOTES
03/04/23 1345   Pre-Hemodialysis Assessment   Treatment Status Completed   During Hemodialysis Assessment   Blood Flow Rate (mL/min) 350 mL/min   Dialysate Flow Rate (mL/min) 600 ml/min   Ultrafiltration Rate (mL/Hr) 1170 mL/Hr   Arteriovenous Lines Secure Yes   Arterial Pressure (mmHg) -190 mmHg   Venous Pressure (mmHg) 150   Blood Volume Processed (Liters) 58 L   UF Removed (mL) 3500 mL   TMP 20   Venous Line in Air Detector Yes   Intake (mL) 250 mL   Intra-Hemodialysis Comments tx completed.  blood returned ports flushed with saline end caps applied.   Post-Hemodialysis Assessment   Rinseback Volume (mL) 250 mL   Blood Volume Processed (Liters) 58 L   Dialyzer Clearance Lightly streaked   Duration of Treatment 180 minutes   Additional Fluid Intake (mL) 500 mL   Total UF (mL) 3500 mL   Net Fluid Removal 3000   Patient Response to Treatment tolerated tx   Post-Treatment Weight 57.4 kg (126 lb 8.7 oz)   Treatment Weight Change -3.4   Post-Hemodialysis Comments pt tolerated tx.     Pt tolerated tx with net uf of 3L noted.

## 2023-03-04 NOTE — PROGRESS NOTES
Pharmacy Consult Discontinuation: Vancomycin    Tabby Howard [id number] is a 33 y.o. female was consulted for vancomycin pharmacotherapy management by pharmacy.    Pharmacy consult for vancomycin dosing is no longer required.  Vancomycin was discontinued 03/04/2023.    Thank you for allowing us to participate in this patient's care. Should you have any questions or concerns please feel free to contact the pharmacy department at [phone].    Liliana Freed, NeilD

## 2023-03-04 NOTE — PROGRESS NOTES
Pharmacokinetic Assessment Follow Up: IV Vancomycin    Vancomycin serum concentration assessment(s):    The random level was drawn correctly and can be used to guide therapy at this time. The measurement is above the desired definitive target range of 10 to 15 mcg/mL.    Vancomycin Regimen Plan:    Re-dose when the random level is less than 15 mcg/mL, next level to be drawn at 0430 on 03/05    Drug levels (last 3 results):  Recent Labs   Lab Result Units 03/03/23  1222 03/04/23  0407   Vancomycin, Random ug/mL  --  16.9   Vancomycin-Trough ug/mL 20.4*  --        Pharmacy will continue to follow and monitor vancomycin.    Please contact pharmacy at extension [phone] for questions regarding this assessment.    Thank you for the consult,   Miah Maloney       Patient brief summary:  Tabby Howard is a 33 y.o. female initiated on antimicrobial therapy with IV Vancomycin for treatment of lower respiratory infection    Drug Allergies:   Review of patient's allergies indicates:   Allergen Reactions    Penicillins Hives       Actual Body Weight:   58 kg    Renal Function:   Estimated Creatinine Clearance: 10.8 mL/min (A) (based on SCr of 5.8 mg/dL (H)).,     CBC (last 72 hours):  Recent Labs   Lab Result Units 03/02/23  0919 03/03/23  0547 03/04/23  0407   WBC K/uL 8.00 5.81 4.86   Hemoglobin g/dL 7.7* 7.6* 7.2*   Hemoglobin A1C %  --  5.8  --    Hematocrit % 23.6* 23.4* 21.8*   Platelets K/uL 131* 151 122*   Gran % % 88.8* 75.9* 72.3   Lymph % % 5.5* 13.9* 15.6*   Mono % % 4.3 8.3 8.0   Eosinophil % % 0.3 1.0 3.1   Basophil % % 0.3 0.2 0.2   Differential Method  Automated Automated Automated       Metabolic Panel (last 72 hours):  Recent Labs   Lab Result Units 03/02/23  0919 03/02/23  1314 03/03/23  0105 03/03/23  0547 03/04/23  0407   Sodium mmol/L 140  --  137 136 138   Potassium mmol/L 3.7  --  3.9 4.0 4.2   Chloride mmol/L 98  --  96 96 98   CO2 mmol/L 26  --  28 28 26   Glucose mg/dL 141* 144* 172* 199* 126*   BUN mg/dL  35*  --  41* 42*  --    Creatinine mg/dL 5.0*  --  5.6* 5.8*  --    Albumin g/dL 3.3*  --   --  2.6*  --    Total Bilirubin mg/dL 3.5*  --   --  2.2*  --    Alkaline Phosphatase U/L 415*  --   --  338*  --    AST U/L 92*  --   --  60*  --    ALT U/L 207*  --   --  132*  --    Magnesium mg/dL 2.0  --   --   --   --        Vancomycin Administrations:  vancomycin given in the last 96 hours                     vancomycin in dextrose 5 % 1 gram/250 mL IVPB 1,000 mg (mg) 1,000 mg New Bag 03/02/23 1258                    Microbiologic Results:  Microbiology Results (last 7 days)       Procedure Component Value Units Date/Time    Blood culture #1 **CANNOT BE ORDERED STAT** [[id number]] Collected: 03/02/23 1015    Order Status: Completed Specimen: Blood from Peripheral, Antecubital, Right Updated: 03/03/23 1232     Blood Culture, Routine No Growth to date      No Growth to date    Blood culture #2 **CANNOT BE ORDERED STAT** [[id number]] Collected: 03/02/23 1030    Order Status: Completed Specimen: Blood from Peripheral, Antecubital, Left Updated: 03/03/23 1232     Blood Culture, Routine No Growth to date      No Growth to date    Culture, Respiratory with Gram Stain [[id number]]     Order Status: No result Specimen: Respiratory from Sputum, Expectorated

## 2023-03-04 NOTE — PLAN OF CARE
Plan of care reviewed with patient, Tabby Howard , verbalized understanding. Blood glucose controlled overnight. Denies NV, tolerating CLD, diet advance to full liquid diabetic. BP uncontrolled requiring PRN medications. Patient progressing toward goals of care. NAEON. Safety measures in place and maintained throughout shift.      Problem: Adult Inpatient Plan of Care  Goal: Plan of Care Review  Outcome: Ongoing, Progressing     Problem: Adult Inpatient Plan of Care  Goal: Patient-Specific Goal (Individualized)  Outcome: Ongoing, Progressing     Problem: Adult Inpatient Plan of Care  Goal: Absence of Hospital-Acquired Illness or Injury  Outcome: Ongoing, Progressing     Problem: Adult Inpatient Plan of Care  Goal: Optimal Comfort and Wellbeing  Outcome: Ongoing, Progressing     Problem: Adult Inpatient Plan of Care  Goal: Readiness for Transition of Care  Outcome: Ongoing, Progressing     Problem: Diabetes Comorbidity  Goal: Blood Glucose Level Within Targeted Range  Outcome: Ongoing, Progressing     Problem: Adjustment to Illness (Sepsis/Septic Shock)  Goal: Optimal Coping  Outcome: Ongoing, Progressing

## 2023-03-04 NOTE — CARE UPDATE
WEAN OFF OXYGEN   03/04/23 0701   Patient Assessment/Suction   Level of Consciousness (AVPU) alert   Expansion/Accessory Muscles/Retractions no use of accessory muscles   All Lung Fields Breath Sounds Anterior:;Lateral:;clear;diminished   Rhythm/Pattern, Respiratory shallow   PRE-TX-O2   Device (Oxygen Therapy) nasal cannula   Flow (L/min) 1   SpO2 95 %   Pulse Oximetry Type Continuous   $ Pulse Oximetry - Multiple Charge Pulse Oximetry - Multiple   Pulse 85   Resp 20   Temp 96.7 °F (35.9 °C)   /72   Positioning   Body Position position changed independently   Head of Bed (HOB) Positioning HOB at 30-45 degrees   Positioning/Transfer Devices pillows;in use   Respiratory Interventions   Cough And Deep Breathing done with encouragement   Education   $ Education DME Oxygen;15 min   Respiratory Evaluation   $ Care Plan Tech Time 15 min   $ Eval/Re-eval Charges Re-evaluation

## 2023-03-04 NOTE — PROGRESS NOTES
Nephrology Consult Note        Patient Name: Tabby Howard  MRN: [id number]    Patient Class: IP- Inpatient   Admission Date: 3/2/2023  Length of Stay: 2 days  Date of Service: 3/4/2023    Attending Physician: No att. providers found  Primary Care Provider: Primary Doctor No    Reason for Consult: esrd/anemia    SUBJECTIVE:     HPI: 33F with ESRD on HD TTS by Dr. Benitez/Henry/Amber, diabetes, gastroparesis, chronic pain, congestive heart failure, history of SVT, sickle cell trait, hypertensive emergencies, presents emergency department with nausea, vomiting, abdominal pain, diarrhea. Her last dialysis was on Tuesday and she has been reasonably compliant. Since last night she is had multiple episodes of vomiting approximally 10, non-bloody, non-bilious, and about 5 episodes of loose watery stool, accompanied by some lower abdominal pain. She can not keep anything down. Patient has had a cholecystectomy and  in the past.    3/3 VSS, more awake. No urgent HD needs, monitor today, re-evaluate in AM. Appreciate Surgery, Pulm input.  3/4 VSS. Seen on HD today. Will start EPo.    Past Medical History:   Diagnosis Date    CKD (chronic kidney disease), stage IV 2022    Diabetes mellitus due to underlying condition with unspecified complications 2022    Gastroparesis 2022    Heart failure with preserved ejection fraction 2022    EF 55% on 3/22    History of gastroesophageal reflux (GERD)     History of supraventricular tachycardia     Hyperkalemia 2022    Hypertensive emergency 2022    Sickle cell trait 2022    Type 2 diabetes mellitus      Past Surgical History:   Procedure Laterality Date     SECTION      x 3    COLONOSCOPY      COLONOSCOPY N/A 2022    Procedure: COLONOSCOPY;  Surgeon: Jagdeep Cedeno MD;  Location: [facility];  Service: Endoscopy;  Laterality: N/A;    ESOPHAGOGASTRODUODENOSCOPY N/A 10/18/2019    Procedure: ESOPHAGOGASTRODUODENOSCOPY (EGD);  Surgeon:  Gianluca Mendez MD;  Location: [facility] ENDO;  Service: Endoscopy;  Laterality: N/A;    ESOPHAGOGASTRODUODENOSCOPY N/A 08/24/2022    Procedure: EGD (ESOPHAGOGASTRODUODENOSCOPY);  Surgeon: Micky Paredes III, MD;  Location: [facility] ENDO;  Service: Endoscopy;  Laterality: N/A;    ESOPHAGOGASTRODUODENOSCOPY N/A 12/5/2022    Procedure: EGD (ESOPHAGOGASTRODUODENOSCOPY);  Surgeon: Marcelo Zhong MD;  Location: [facility] ENDO;  Service: Endoscopy;  Laterality: N/A;    LAPAROSCOPIC CHOLECYSTECTOMY N/A 07/30/2022    Procedure: CHOLECYSTECTOMY, LAPAROSCOPIC;  Surgeon: Grey Perez MD;  Location: [facility] OR;  Service: General;  Laterality: N/A;    PLACEMENT OF DUAL-LUMEN VASCULAR CATHETER Left 07/12/2022    Procedure: INSERTION-CATHETER-JOSEPH;  Surgeon: Dionte Gan MD;  Location: [facility] OR;  Service: General;  Laterality: Left;    PLACEMENT OF DUAL-LUMEN VASCULAR CATHETER Right 07/26/2022    Procedure: INSERTION-CATHETER-Hemosplit;  Surgeon: Dionte Gan MD;  Location: [facility] OR;  Service: General;  Laterality: Right;     Family History   Problem Relation Age of Onset    Diabetes Mother     Diabetes Father      Social History     Tobacco Use    Smoking status: Never    Smokeless tobacco: Never   Substance Use Topics    Alcohol use: Not Currently    Drug use: No       Review of patient's allergies indicates:   Allergen Reactions    Penicillins Hives       Outpatient meds:  No current facility-administered medications on file prior to encounter.     Current Outpatient Medications on File Prior to Encounter   Medication Sig Dispense Refill    albuterol (PROVENTIL/VENTOLIN HFA) 90 mcg/actuation inhaler Inhale 2 puffs into the lungs every 6 (six) hours as needed for Wheezing. Rescue 18 g 3    amLODIPine (NORVASC) 10 MG tablet Take 1 tablet (10 mg total) by mouth once daily. 30 tablet 11    amLODIPine (NORVASC) 10 MG tablet Take 1 tablet every day by oral route for 30 days. (Patient taking differently: Take 10 mg by mouth once  daily.) 30 tablet 2    apixaban (ELIQUIS) 5 mg Tab Take 5 mg by mouth 2 (two) times daily.      apixaban (ELIQUIS) 5 mg Tab Take 1 tablet (5 mg total) by mouth 2 (two) times daily. 60 tablet 2    apixaban (ELIQUIS) 5 mg Tab Take 1 tablet twice a day by oral route for 30 days. (Patient taking differently: Take 5 mg by mouth 2 (two) times daily.) 60 tablet 2    BINAXNOW COVID-19 AG SELF TEST Kit Take 1 Dose by mouth once.      carvediloL (COREG) 25 MG tablet Take 1 tablet twice a day by oral route for 30 days. (Patient taking differently: Take 25 mg by mouth 2 (two) times daily.) 60 tablet 2    cloNIDine 0.2 mg/24 hr td ptwk (CATAPRES) 0.2 mg/24 hr Apply 1 patch to skin once every week. (Patient taking differently: Place 1 patch onto the skin once a week.) 4 patch 2    dicyclomine (BENTYL) 10 MG capsule Take 1 capsule (10 mg total) by mouth 3 (three) times daily. 90 capsule 0    dicyclomine (BENTYL) 10 MG capsule Take 1 capsule 3 times a day by oral route for 30 days. (Patient taking differently: Take 10 mg by mouth 3 (three) times daily.) 90 capsule 2    ferrous sulfate 325 (65 FE) MG EC tablet Take 325 mg by mouth once daily.      FLUoxetine 20 MG capsule Take 1 capsule (20 mg total) by mouth once daily. 30 capsule 11    FLUoxetine 20 MG capsule Take 1 capsule every day by oral route for 30 days. (Patient taking differently: Take 20 mg by mouth once daily.) 30 capsule 2    furosemide (LASIX) 40 MG tablet Take 40 mg by mouth 2 (two) times a day.      gabapentin (NEURONTIN) 100 MG capsule Take 1 capsule (100 mg total) by mouth 2 (two) times daily. 90 capsule 2    gabapentin (NEURONTIN) 100 MG capsule Take 1 capsule by mouth 3 times daily (Patient taking differently: Take 100 mg by mouth 3 (three) times daily.) 90 capsule 2    hydrALAZINE (APRESOLINE) 100 MG tablet Take 1 tablet twice a day by oral route for 30 days. (Patient taking differently: Take 100 mg by mouth 2 (two) times daily.) 60 tablet 2     HYDROcodone-acetaminophen (NORCO)  mg per tablet Take 1 tablet by mouth every 4 (four) hours as needed for Pain.      HYDROmorphone (DILAUDID) 4 MG tablet Take 4 mg by mouth every 12 (twelve) hours as needed for Pain.      insulin aspart U-100 (NOVOLOG FLEXPEN U-100 INSULIN) 100 unit/mL (3 mL) InPn pen Inject 10 units under the skin 3 times a day before meals. (Patient taking differently: Inject 10 Units into the skin 3 (three) times daily with meals.) 15 mL 5    insulin aspart U-100 (NOVOLOG) 100 unit/mL (3 mL) InPn pen Inject 1-10 Units into the skin before meals and at bedtime as needed (Hyperglycemia). Max daily dose 40 units. 3 mL 6    insulin detemir U-100 (LEVEMIR FLEXTOUCH U-100 INSULN) 100 unit/mL (3 mL) InPn pen Inject 18 units every day by subcutaneous route in the evening. (Patient taking differently: Inject 18 Units into the skin every evening.) 15 mL 5    insulin glargine 100 units/mL SubQ pen Inject 16 Units into the skin every evening.      isosorbide mononitrate (IMDUR) 60 MG 24 hr tablet Take 2 tablets (120 mg total) by mouth once daily. 30 tablet 11    isosorbide mononitrate (IMDUR) 60 MG 24 hr tablet Take 1 tablet every day by oral route for 30 days. (Patient taking differently: Take 60 mg by mouth once daily.) 30 tablet 2    levETIRAcetam (KEPPRA) 500 MG Tab Take 1 tablet (500 mg total) by mouth 2 (two) times daily. 60 tablet 11    levETIRAcetam (KEPPRA) 500 MG Tab Take 1 tablet twice a day by oral route for 30 days. (Patient taking differently: Take 500 mg by mouth once daily.) 60 tablet 2    losartan-hydrochlorothiazide 100-12.5 mg (HYZAAR) 100-12.5 mg Tab Take 1 tablet by mouth once daily.      mirtazapine (REMERON SOL-TAB) 15 MG disintegrating tablet Take 1 tablet (15 mg total) by mouth nightly. 90 tablet 0    ondansetron (ZOFRAN) 4 MG tablet Take 1 tablet (4 mg total) by mouth every 8 (eight) hours as needed for Nausea. 60 tablet 2    ondansetron (ZOFRAN-ODT) 4 MG TbDL Place1 tablet  "on the tongue every 8 hours as needed (Patient taking differently: Take 4 mg by mouth every 6 (six) hours as needed.) 24 tablet 0    pantoprazole (PROTONIX) 40 MG tablet Take 40 mg by mouth once daily.      pantoprazole (PROTONIX) 40 MG tablet Take 1 tablet every day by oral route for 30 days. (Patient taking differently: Take 40 mg by mouth once daily.) 30 tablet 2    pen needle, diabetic (BD ULTRA-FINE MAGALIE PEN NEEDLE) 32 gauge x 5/32" Ndle Inject into the skin 5 times per day with insulin 100 each 12    promethazine (PHENERGAN) 25 MG suppository Place 1 suppository (25 mg total) rectally every 6 (six) hours as needed for Nausea. 10 suppository 0    torsemide (DEMADEX) 100 MG Tab Take 100 mg by mouth once daily.      [DISCONTINUED] atenoloL (TENORMIN) 50 MG tablet Take 1 tablet (50 mg total) by mouth every other day. 45 tablet 3    [DISCONTINUED] omeprazole (PRILOSEC) 20 MG capsule Take 2 capsules (40 mg total) by mouth once daily. for 10 days 20 capsule 0       Scheduled meds:   sodium chloride 0.9%   Intravenous Once    chlorhexidine  15 mL Mouth/Throat BID    levetiracetam IV  500 mg Intravenous Q12H    mupirocin   Nasal BID    pantoprazole  40 mg Intravenous Daily    sodium chloride 0.9%  30 mL/kg Intravenous Once       Infusions:   dextrose 10 % in water (D10W) Stopped (03/02/23 2320)       PRN meds:  acetaminophen, acetaminophen, bisacodyL, dextrose 50%, dextrose 50%, glucagon (human recombinant), hydrALAZINE, insulin aspart U-100, morphine, ondansetron, sodium chloride 0.9%, Pharmacy to dose Vancomycin consult **AND** vancomycin - pharmacy to dose    Review of Systems:    OBJECTIVE:     Vital Signs and IO:  Temp:  [96.7 °F (35.9 °C)-98.3 °F (36.8 °C)]   Pulse:  [80-90]   Resp:  [9-26]   BP: (123-186)/()   SpO2:  [90 %-100 %]   I/O last 3 completed shifts:  In: 740 [P.O.:540; IV Piggyback:200]  Out: 350 [Urine:350]  Wt Readings from Last 5 Encounters:   03/02/23 58 kg (127 lb 13.9 oz)   02/08/23 59.9 " kg (132 lb)   02/06/23 59.9 kg (132 lb)   02/05/23 59.9 kg (132 lb)   01/26/23 43.9 kg (96 lb 12.5 oz)     Body mass index is 23.39 kg/m².    Physical Exam  Constitutional:       General: Patient is not in acute distress.     Appearance: Patient is well-developed. She is not diaphoretic.   HENT:      Head: Normocephalic and atraumatic.      Mouth/Throat: Mucous membranes are moist.   Eyes:      General: No scleral icterus.     Pupils: Pupils are equal, round, and reactive to light.   Cardiovascular:      Rate and Rhythm: Normal rate and regular rhythm.   Pulmonary:      Effort: Pulmonary effort is normal. No respiratory distress.      Breath sounds: No stridor.   Abdominal:      General: There is no distension.      Palpations: Abdomen is soft.   Musculoskeletal:         General: No deformity. Normal range of motion.      Cervical back: Neck supple.   Skin:     General: Skin is warm and dry.      Findings: No rash present. No erythema.   Neurological:      Mental Status: Patient is alert and oriented to person, place, and time.      Cranial Nerves: No cranial nerve deficit.   Psychiatric:         Behavior: Behavior normal.     Laboratory:  Recent Labs   Lab 03/03/23  0105 03/03/23  0547 03/04/23  0407    136 138   K 3.9 4.0 4.2   CL 96 96 98   CO2 28 28 26   BUN 41* 42* 49*   CREATININE 5.6* 5.8* 6.6*   * 199* 126*         Recent Labs   Lab 03/02/23  0919 03/03/23  0105 03/03/23  0547 03/04/23  0407   CALCIUM 8.6* 6.9* 7.5* 7.3*   ALBUMIN 3.3*  --  2.6* 2.6*   MG 2.0  --   --   --          Recent Labs   Lab 07/08/22  0516   PTH, Intact 289.8 H         No results for input(s): POCTGLUCOSE in the last 168 hours.    Recent Labs   Lab 08/24/22  0218 12/23/22  1413 03/03/23  0547   Hemoglobin A1C 6.2 7.5 H 5.8         Recent Labs   Lab 03/02/23  0919 03/02/23  1535 03/03/23  0547 03/04/23  0407   WBC 8.00  --  5.81 4.86   HGB 7.7*  --  7.6* 7.2*   HCT 23.6* 25* 23.4* 21.8*   *  --  151 122*   MCV 90   --  89 90   MCHC 32.6  --  32.5 33.0   MONO 4.3  0.3  --  8.3  0.5 8.0  0.4         Recent Labs   Lab 03/02/23  0919 03/03/23  0547 03/04/23  0407   BILITOT 3.5* 2.2* 2.4*   PROT 7.7 5.8* 5.9*   ALBUMIN 3.3* 2.6* 2.6*   ALKPHOS 415* 338* 348*   * 132* 118*   AST 92* 60* 63*         Recent Labs   Lab 10/24/22  0107 11/19/22  1210 02/05/23  0156   Color, UA Yellow Yellow Yellow   Appearance, UA Clear Clear Clear   pH, UA 8.0 8.0 >8.0 A   Specific Bromide, UA 1.020 1.025 1.020   Protein, UA 4+ 4+ 3+ A   Glucose, UA 4+ A 4+ A 2+ A   Ketones, UA Negative Negative 1+ A   Urobilinogen, UA Negative Negative Negative   Bilirubin (UA) Negative 1+ A 2+ A   Occult Blood UA 2+ A 2+ A 3+ A   Nitrite, UA Negative Negative Negative   RBC, UA 41 H 38 H 15 H   WBC, UA 24 H 25 H 1   Bacteria Negative Negative Rare   Hyaline Casts, UA 6 A 2 A 2 A         Recent Labs   Lab 02/07/23  0855 03/02/23  1225 03/02/23  1535   POC PH 7.397 7.377 7.383   POC PCO2 41.6 54.8 H 50.3 H   POC HCO3 25.6 32.2 H 30.0 H   POC PO2 89 113 H 104 H   POC SATURATED O2 97 98 98   POC BE 1 7 5   Sample ARTERIAL ARTERIAL ARTERIAL         Microbiology Results (last 7 days)       Procedure Component Value Units Date/Time    Blood culture #1 **CANNOT BE ORDERED STAT** [[id number]] Collected: 03/02/23 1015    Order Status: Completed Specimen: Blood from Peripheral, Antecubital, Right Updated: 03/03/23 1232     Blood Culture, Routine No Growth to date      No Growth to date    Blood culture #2 **CANNOT BE ORDERED STAT** [[id number]] Collected: 03/02/23 1030    Order Status: Completed Specimen: Blood from Peripheral, Antecubital, Left Updated: 03/03/23 1232     Blood Culture, Routine No Growth to date      No Growth to date    Culture, Respiratory with Gram Stain [[id number]]     Order Status: No result Specimen: Respiratory from Sputum, Expectorated             ASSESSMENT/PLAN:     ESRD on HD TTS via AVF  Next HD PRN, then per schedule.  Continue current  dialysis prescription.  Renal diet - low K, low phos.  No IVs or BP checks on access and/or non-dominant arm.    Anemia of CKD  Hgb and HCT are acceptable. Monitor for now.  Do not transfuse yet, will start Epoetin.  Drop in Hgb may be dilutional.    MBD / Secondary HPT  Ca, Phos, PTH and vitamin D levels are acceptable.   Phos binders, vitamin D and analogues, calcimimetics will be given as needed.    HTN  BP seem controlled.   Tolerate asymptomatic HTN up to -160.  Continue home meds.  Low sodium diet.    Abdominal pain 2/2 intestinal intussusception  Management per primary team, Surgery.    Thank you for allowing us to participate in the care of your patient!   We will follow the patient and provide recommendations as needed.    Patient care time was spent personally by me on the following activities:     Obtaining a history.  Examination of patient.  Providing medical care at the patients bedside.  Developing a treatment plan with patient or surrogate and bedside caregivers.  Ordering and reviewing laboratory studies, radiographic studies, pulse oximetry.  Ordering and performing treatments and interventions.  Evaluation of patient's response to treatment.  Discussions with consultants while on the unit and immediately available to the patient.  Re-evaluation of the patient's condition.  Documentation in the medical record.     Mando Benitez MD    [facility]  [address] [place] [id number]    [phone] - tel  [phone] - fax    3/4/2023

## 2023-03-04 NOTE — PROGRESS NOTES
Subjective:  Patient seen and examined in the ICU.  Patient currently has no questions. Denies any chest pain, shortness a breath, dizziness, chest pain or any other complaints    General:  NAD:   HEENT:  Atraumatic, normocephalic,  Cardio:  Normal rate and rhythm on tele  Resp:  Nonlabored breathing, speaking full sentences, no use of accessory muscles  Skin:  No lesions or rashes noted grossly.  Neuro:  AAO x4, no obvious deficits    Intussusception   ESRD  Refractory hypertension  Pulmonary hypertension    -okay to transfer out of ICU.  Patient home antihypertensives have been started.   -intussusception appears to have resolved.  Further management as per surgery.  Patient has no GI symptoms today

## 2023-03-04 NOTE — PLAN OF CARE
Updated pt on plan of care. Verbalized understanding. Diet advanced to Diabetic / Renal diet at lunch, tolerated well. Dialysis resumed today, took off 3L. Uncontrolled BP--home medications ordered, clonidine patch applied. Safety measures in place and maintained throughout the shift.  Problem: Adult Inpatient Plan of Care  Goal: Plan of Care Review  Outcome: Ongoing, Progressing  Goal: Patient-Specific Goal (Individualized)  Outcome: Ongoing, Progressing  Goal: Absence of Hospital-Acquired Illness or Injury  Outcome: Ongoing, Progressing  Goal: Optimal Comfort and Wellbeing  Outcome: Ongoing, Progressing  Goal: Readiness for Transition of Care  Outcome: Ongoing, Progressing     Problem: Diabetes Comorbidity  Goal: Blood Glucose Level Within Targeted Range  Outcome: Ongoing, Progressing     Problem: Adjustment to Illness (Sepsis/Septic Shock)  Goal: Optimal Coping  Outcome: Ongoing, Progressing     Problem: Bleeding (Sepsis/Septic Shock)  Goal: Absence of Bleeding  Outcome: Ongoing, Progressing     Problem: Glycemic Control Impaired (Sepsis/Septic Shock)  Goal: Blood Glucose Level Within Desired Range  Outcome: Ongoing, Progressing     Problem: Infection Progression (Sepsis/Septic Shock)  Goal: Absence of Infection Signs and Symptoms  Outcome: Ongoing, Progressing     Problem: Nutrition Impaired (Sepsis/Septic Shock)  Goal: Optimal Nutrition Intake  Outcome: Ongoing, Progressing     Problem: Infection  Goal: Absence of Infection Signs and Symptoms  Outcome: Ongoing, Progressing     Problem: Skin Injury Risk Increased  Goal: Skin Health and Integrity  Outcome: Ongoing, Progressing     Problem: Device-Related Complication Risk (Hemodialysis)  Goal: Safe, Effective Therapy Delivery  Outcome: Ongoing, Progressing     Problem: Hemodynamic Instability (Hemodialysis)  Goal: Effective Tissue Perfusion  Outcome: Ongoing, Progressing     Problem: Infection (Hemodialysis)  Goal: Absence of Infection Signs and  Symptoms  Outcome: Ongoing, Progressing

## 2023-03-05 LAB
ALBUMIN SERPL BCP-MCNC: 2.4 G/DL (ref 3.5–5.2)
ALP SERPL-CCNC: 372 U/L (ref 55–135)
ALT SERPL W/O P-5'-P-CCNC: 99 U/L (ref 10–44)
ANION GAP SERPL CALC-SCNC: 8 MMOL/L (ref 8–16)
AST SERPL-CCNC: 58 U/L (ref 10–40)
BASOPHILS # BLD AUTO: 0.01 K/UL (ref 0–0.2)
BASOPHILS NFR BLD: 0.3 % (ref 0–1.9)
BILIRUB SERPL-MCNC: 2.1 MG/DL (ref 0.1–1)
BUN SERPL-MCNC: 26 MG/DL (ref 6–20)
CALCIUM SERPL-MCNC: 7.9 MG/DL (ref 8.7–10.5)
CHLORIDE SERPL-SCNC: 104 MMOL/L (ref 95–110)
CO2 SERPL-SCNC: 23 MMOL/L (ref 23–29)
CREAT SERPL-MCNC: 4.3 MG/DL (ref 0.5–1.4)
DIFFERENTIAL METHOD: ABNORMAL
EOSINOPHIL # BLD AUTO: 0.1 K/UL (ref 0–0.5)
EOSINOPHIL NFR BLD: 2.5 % (ref 0–8)
ERYTHROCYTE [DISTWIDTH] IN BLOOD BY AUTOMATED COUNT: 19 % (ref 11.5–14.5)
EST. GFR  (NO RACE VARIABLE): 13.2 ML/MIN/1.73 M^2
GLUCOSE SERPL-MCNC: 289 MG/DL (ref 70–110)
GLUCOSE SERPL-MCNC: 305 MG/DL (ref 70–110)
GLUCOSE SERPL-MCNC: 319 MG/DL (ref 70–110)
GLUCOSE SERPL-MCNC: 326 MG/DL (ref 70–110)
GLUCOSE SERPL-MCNC: 348 MG/DL (ref 70–110)
HCT VFR BLD AUTO: 23.8 % (ref 37–48.5)
HGB BLD-MCNC: 7.7 G/DL (ref 12–16)
IMM GRANULOCYTES # BLD AUTO: 0.02 K/UL (ref 0–0.04)
IMM GRANULOCYTES NFR BLD AUTO: 0.6 % (ref 0–0.5)
LYMPHOCYTES # BLD AUTO: 0.5 K/UL (ref 1–4.8)
LYMPHOCYTES NFR BLD: 14.1 % (ref 18–48)
MCH RBC QN AUTO: 29.4 PG (ref 27–31)
MCHC RBC AUTO-ENTMCNC: 32.4 G/DL (ref 32–36)
MCV RBC AUTO: 91 FL (ref 82–98)
MONOCYTES # BLD AUTO: 0.3 K/UL (ref 0.3–1)
MONOCYTES NFR BLD: 8 % (ref 4–15)
NEUTROPHILS # BLD AUTO: 2.7 K/UL (ref 1.8–7.7)
NEUTROPHILS NFR BLD: 74.5 % (ref 38–73)
NRBC BLD-RTO: 0 /100 WBC
PLATELET # BLD AUTO: 116 K/UL (ref 150–450)
PMV BLD AUTO: 9.3 FL (ref 9.2–12.9)
POTASSIUM SERPL-SCNC: 4.7 MMOL/L (ref 3.5–5.1)
PROT SERPL-MCNC: 5.6 G/DL (ref 6–8.4)
RBC # BLD AUTO: 2.62 M/UL (ref 4–5.4)
SODIUM SERPL-SCNC: 135 MMOL/L (ref 136–145)
WBC # BLD AUTO: 3.62 K/UL (ref 3.9–12.7)

## 2023-03-05 PROCEDURE — 12000002 HC ACUTE/MED SURGE SEMI-PRIVATE ROOM

## 2023-03-05 PROCEDURE — 25000003 PHARM REV CODE 250: Performed by: INTERNAL MEDICINE

## 2023-03-05 PROCEDURE — 36415 COLL VENOUS BLD VENIPUNCTURE: CPT | Performed by: NURSE PRACTITIONER

## 2023-03-05 PROCEDURE — 85025 COMPLETE CBC W/AUTO DIFF WBC: CPT | Performed by: NURSE PRACTITIONER

## 2023-03-05 PROCEDURE — 63600175 PHARM REV CODE 636 W HCPCS: Performed by: INTERNAL MEDICINE

## 2023-03-05 PROCEDURE — 82962 GLUCOSE BLOOD TEST: CPT

## 2023-03-05 PROCEDURE — 80053 COMPREHEN METABOLIC PANEL: CPT | Performed by: NURSE PRACTITIONER

## 2023-03-05 PROCEDURE — 63600175 PHARM REV CODE 636 W HCPCS: Performed by: NURSE PRACTITIONER

## 2023-03-05 PROCEDURE — C9113 INJ PANTOPRAZOLE SODIUM, VIA: HCPCS | Performed by: NURSE PRACTITIONER

## 2023-03-05 RX ADMIN — INSULIN ASPART 4 UNITS: 100 INJECTION, SOLUTION INTRAVENOUS; SUBCUTANEOUS at 12:03

## 2023-03-05 RX ADMIN — HYDRALAZINE HYDROCHLORIDE 100 MG: 25 TABLET, FILM COATED ORAL at 09:03

## 2023-03-05 RX ADMIN — HYDRALAZINE HYDROCHLORIDE 100 MG: 25 TABLET, FILM COATED ORAL at 08:03

## 2023-03-05 RX ADMIN — CARVEDILOL 25 MG: 25 TABLET, FILM COATED ORAL at 08:03

## 2023-03-05 RX ADMIN — HYDROCHLOROTHIAZIDE 12.5 MG: 12.5 TABLET ORAL at 09:03

## 2023-03-05 RX ADMIN — INSULIN ASPART 2 UNITS: 100 INJECTION, SOLUTION INTRAVENOUS; SUBCUTANEOUS at 10:03

## 2023-03-05 RX ADMIN — LEVETIRACETAM 500 MG: 5 INJECTION INTRAVENOUS at 08:03

## 2023-03-05 RX ADMIN — LEVETIRACETAM 500 MG: 5 INJECTION INTRAVENOUS at 09:03

## 2023-03-05 RX ADMIN — MORPHINE SULFATE 2 MG: 2 INJECTION, SOLUTION INTRAMUSCULAR; INTRAVENOUS at 09:03

## 2023-03-05 RX ADMIN — CARVEDILOL 25 MG: 25 TABLET, FILM COATED ORAL at 09:03

## 2023-03-05 RX ADMIN — LOSARTAN POTASSIUM 100 MG: 50 TABLET, FILM COATED ORAL at 09:03

## 2023-03-05 RX ADMIN — AMLODIPINE BESYLATE 10 MG: 5 TABLET ORAL at 09:03

## 2023-03-05 RX ADMIN — HYDRALAZINE HYDROCHLORIDE 10 MG: 20 INJECTION INTRAMUSCULAR; INTRAVENOUS at 12:03

## 2023-03-05 RX ADMIN — PANTOPRAZOLE SODIUM 40 MG: 40 INJECTION, POWDER, LYOPHILIZED, FOR SOLUTION INTRAVENOUS at 09:03

## 2023-03-05 NOTE — PROGRESS NOTES
Subjective:  Patient seen and examined today no overnight events reported.  No questions or complaints    General:  NAD:   HEENT:  Atraumatic, normocephalic,  Cardio:  Normal rate and rhythm on tele  Resp:  Nonlabored breathing, speaking full sentences, no use of accessory muscles  Skin:  No lesions or rashes noted grossly.  Neuro:  AAO x4, no obvious deficits    Intussusception   ESRD  Refractory hypertension  Pulmonary hypertension    -transferred out ICU yesterday 3/4. Patient home antihypertensives have been started.   -intussusception appears to have resolved.  Further management as per surgery.  Patient has no GI symptoms today

## 2023-03-05 NOTE — CARE UPDATE
03/04/23 2126   PRE-TX-O2   Device (Oxygen Therapy) nasal cannula   $ Is the patient on Low Flow Oxygen? Yes   Pulse Oximetry Type Continuous   $ Pulse Oximetry - Multiple Charge Pulse Oximetry - Multiple   Respiratory Evaluation   $ Care Plan Tech Time 15 min

## 2023-03-05 NOTE — PROGRESS NOTES
Nephrology Progress Note        Patient Name: Tabby Howard  MRN: [id number]    Patient Class: IP- Inpatient   Admission Date: 3/2/2023  Length of Stay: 3 days  Date of Service: 3/5/2023    Attending Physician: Xiang Vasquez MD  Primary Care Provider: Primary Doctor No    Reason for Consult: esrd/anemia    SUBJECTIVE:     HPI: 33F with ESRD on HD TTS by Dr. Benitez/Henry/Amber, diabetes, gastroparesis, chronic pain, congestive heart failure, history of SVT, sickle cell trait, hypertensive emergencies, presents emergency department with nausea, vomiting, abdominal pain, diarrhea. Her last dialysis was on Tuesday and she has been reasonably compliant. Since last night she is had multiple episodes of vomiting approximally 10, non-bloody, non-bilious, and about 5 episodes of loose watery stool, accompanied by some lower abdominal pain. She can not keep anything down. Patient has had a cholecystectomy and  in the past.    3/3 VSS, more awake. No urgent HD needs, monitor today, re-evaluate in AM. Appreciate Surgery, Pulm input.  3/4 VSS. Seen on HD today. Will start EPo.  3/5  lab results reviewed, pressures elevated--po meds just resumed yesterday.  No specific complaints, just doesn't feel well.    Past Medical History:   Diagnosis Date    CKD (chronic kidney disease), stage IV 2022    Diabetes mellitus due to underlying condition with unspecified complications 2022    Gastroparesis 2022    Heart failure with preserved ejection fraction 2022    EF 55% on 3/22    History of gastroesophageal reflux (GERD)     History of supraventricular tachycardia     Hyperkalemia 2022    Hypertensive emergency 2022    Sickle cell trait 2022    Type 2 diabetes mellitus      Past Surgical History:   Procedure Laterality Date     SECTION      x 3    COLONOSCOPY      COLONOSCOPY N/A 2022    Procedure: COLONOSCOPY;  Surgeon: Jagdeep Cedeno MD;  Location: [facility];  Service: Endoscopy;   Laterality: N/A;    ESOPHAGOGASTRODUODENOSCOPY N/A 10/18/2019    Procedure: ESOPHAGOGASTRODUODENOSCOPY (EGD);  Surgeon: Gianluca Mendez MD;  Location: [facility] ENDO;  Service: Endoscopy;  Laterality: N/A;    ESOPHAGOGASTRODUODENOSCOPY N/A 08/24/2022    Procedure: EGD (ESOPHAGOGASTRODUODENOSCOPY);  Surgeon: Micky Paredes III, MD;  Location: [facility] ENDO;  Service: Endoscopy;  Laterality: N/A;    ESOPHAGOGASTRODUODENOSCOPY N/A 12/5/2022    Procedure: EGD (ESOPHAGOGASTRODUODENOSCOPY);  Surgeon: Marcelo Zhong MD;  Location: [facility];  Service: Endoscopy;  Laterality: N/A;    LAPAROSCOPIC CHOLECYSTECTOMY N/A 07/30/2022    Procedure: CHOLECYSTECTOMY, LAPAROSCOPIC;  Surgeon: Grey Perez MD;  Location: [facility] OR;  Service: General;  Laterality: N/A;    PLACEMENT OF DUAL-LUMEN VASCULAR CATHETER Left 07/12/2022    Procedure: INSERTION-CATHETER-JOSEPH;  Surgeon: Dionte Gan MD;  Location: [facility] OR;  Service: General;  Laterality: Left;    PLACEMENT OF DUAL-LUMEN VASCULAR CATHETER Right 07/26/2022    Procedure: INSERTION-CATHETER-Hemosplit;  Surgeon: Dionte Gan MD;  Location: [facility] OR;  Service: General;  Laterality: Right;     Family History   Problem Relation Age of Onset    Diabetes Mother     Diabetes Father      Social History     Tobacco Use    Smoking status: Never    Smokeless tobacco: Never   Substance Use Topics    Alcohol use: Not Currently    Drug use: No       Review of patient's allergies indicates:   Allergen Reactions    Penicillins Hives       Outpatient meds:  No current facility-administered medications on file prior to encounter.     Current Outpatient Medications on File Prior to Encounter   Medication Sig Dispense Refill    albuterol (PROVENTIL/VENTOLIN HFA) 90 mcg/actuation inhaler Inhale 2 puffs into the lungs every 6 (six) hours as needed for Wheezing. Rescue 18 g 3    amLODIPine (NORVASC) 10 MG tablet Take 1 tablet (10 mg total) by mouth once daily. 30 tablet 11    amLODIPine (NORVASC)  10 MG tablet Take 1 tablet every day by oral route for 30 days. (Patient taking differently: Take 10 mg by mouth once daily.) 30 tablet 2    apixaban (ELIQUIS) 5 mg Tab Take 5 mg by mouth 2 (two) times daily.      apixaban (ELIQUIS) 5 mg Tab Take 1 tablet (5 mg total) by mouth 2 (two) times daily. 60 tablet 2    apixaban (ELIQUIS) 5 mg Tab Take 1 tablet twice a day by oral route for 30 days. (Patient taking differently: Take 5 mg by mouth 2 (two) times daily.) 60 tablet 2    BINAXNOW COVID-19 AG SELF TEST Kit Take 1 Dose by mouth once.      carvediloL (COREG) 25 MG tablet Take 1 tablet twice a day by oral route for 30 days. (Patient taking differently: Take 25 mg by mouth 2 (two) times daily.) 60 tablet 2    cloNIDine 0.2 mg/24 hr td ptwk (CATAPRES) 0.2 mg/24 hr Apply 1 patch to skin once every week. (Patient taking differently: Place 1 patch onto the skin once a week.) 4 patch 2    dicyclomine (BENTYL) 10 MG capsule Take 1 capsule (10 mg total) by mouth 3 (three) times daily. 90 capsule 0    dicyclomine (BENTYL) 10 MG capsule Take 1 capsule 3 times a day by oral route for 30 days. (Patient taking differently: Take 10 mg by mouth 3 (three) times daily.) 90 capsule 2    ferrous sulfate 325 (65 FE) MG EC tablet Take 325 mg by mouth once daily.      FLUoxetine 20 MG capsule Take 1 capsule (20 mg total) by mouth once daily. 30 capsule 11    FLUoxetine 20 MG capsule Take 1 capsule every day by oral route for 30 days. (Patient taking differently: Take 20 mg by mouth once daily.) 30 capsule 2    furosemide (LASIX) 40 MG tablet Take 40 mg by mouth 2 (two) times a day.      gabapentin (NEURONTIN) 100 MG capsule Take 1 capsule (100 mg total) by mouth 2 (two) times daily. 90 capsule 2    gabapentin (NEURONTIN) 100 MG capsule Take 1 capsule by mouth 3 times daily (Patient taking differently: Take 100 mg by mouth 3 (three) times daily.) 90 capsule 2    hydrALAZINE (APRESOLINE) 100 MG tablet Take 1 tablet twice a day by oral  route for 30 days. (Patient taking differently: Take 100 mg by mouth 2 (two) times daily.) 60 tablet 2    HYDROcodone-acetaminophen (NORCO)  mg per tablet Take 1 tablet by mouth every 4 (four) hours as needed for Pain.      HYDROmorphone (DILAUDID) 4 MG tablet Take 4 mg by mouth every 12 (twelve) hours as needed for Pain.      insulin aspart U-100 (NOVOLOG FLEXPEN U-100 INSULIN) 100 unit/mL (3 mL) InPn pen Inject 10 units under the skin 3 times a day before meals. (Patient taking differently: Inject 10 Units into the skin 3 (three) times daily with meals.) 15 mL 5    insulin aspart U-100 (NOVOLOG) 100 unit/mL (3 mL) InPn pen Inject 1-10 Units into the skin before meals and at bedtime as needed (Hyperglycemia). Max daily dose 40 units. 3 mL 6    insulin detemir U-100 (LEVEMIR FLEXTOUCH U-100 INSULN) 100 unit/mL (3 mL) InPn pen Inject 18 units every day by subcutaneous route in the evening. (Patient taking differently: Inject 18 Units into the skin every evening.) 15 mL 5    insulin glargine 100 units/mL SubQ pen Inject 16 Units into the skin every evening.      isosorbide mononitrate (IMDUR) 60 MG 24 hr tablet Take 2 tablets (120 mg total) by mouth once daily. 30 tablet 11    isosorbide mononitrate (IMDUR) 60 MG 24 hr tablet Take 1 tablet every day by oral route for 30 days. (Patient taking differently: Take 60 mg by mouth once daily.) 30 tablet 2    levETIRAcetam (KEPPRA) 500 MG Tab Take 1 tablet (500 mg total) by mouth 2 (two) times daily. 60 tablet 11    levETIRAcetam (KEPPRA) 500 MG Tab Take 1 tablet twice a day by oral route for 30 days. (Patient taking differently: Take 500 mg by mouth once daily.) 60 tablet 2    losartan-hydrochlorothiazide 100-12.5 mg (HYZAAR) 100-12.5 mg Tab Take 1 tablet by mouth once daily.      mirtazapine (REMERON SOL-TAB) 15 MG disintegrating tablet Take 1 tablet (15 mg total) by mouth nightly. 90 tablet 0    ondansetron (ZOFRAN) 4 MG tablet Take 1 tablet (4 mg total) by mouth  "every 8 (eight) hours as needed for Nausea. 60 tablet 2    ondansetron (ZOFRAN-ODT) 4 MG TbDL Place1 tablet on the tongue every 8 hours as needed (Patient taking differently: Take 4 mg by mouth every 6 (six) hours as needed.) 24 tablet 0    pantoprazole (PROTONIX) 40 MG tablet Take 40 mg by mouth once daily.      pantoprazole (PROTONIX) 40 MG tablet Take 1 tablet every day by oral route for 30 days. (Patient taking differently: Take 40 mg by mouth once daily.) 30 tablet 2    pen needle, diabetic (BD ULTRA-FINE MAGALIE PEN NEEDLE) 32 gauge x 5/32" Ndle Inject into the skin 5 times per day with insulin 100 each 12    promethazine (PHENERGAN) 25 MG suppository Place 1 suppository (25 mg total) rectally every 6 (six) hours as needed for Nausea. 10 suppository 0    torsemide (DEMADEX) 100 MG Tab Take 100 mg by mouth once daily.      [DISCONTINUED] atenoloL (TENORMIN) 50 MG tablet Take 1 tablet (50 mg total) by mouth every other day. 45 tablet 3    [DISCONTINUED] omeprazole (PRILOSEC) 20 MG capsule Take 2 capsules (40 mg total) by mouth once daily. for 10 days 20 capsule 0       Scheduled meds:   sodium chloride 0.9%   Intravenous Once    amLODIPine  10 mg Oral Daily    carvediloL  25 mg Oral BID    chlorhexidine  15 mL Mouth/Throat BID    cloNIDine 0.2 mg/24 hr td ptwk  1 patch Transdermal Weekly    epoetin teresa-epbx  10,000 Units Subcutaneous Every Tues, Thurs, Sat    hydrALAZINE  100 mg Oral BID    losartan  100 mg Oral Daily    And    hydroCHLOROthiazide  12.5 mg Oral Daily    levetiracetam IV  500 mg Intravenous Q12H    mupirocin   Nasal BID    pantoprazole  40 mg Intravenous Daily    sodium chloride 0.9%  30 mL/kg Intravenous Once       Infusions:        PRN meds:  acetaminophen, acetaminophen, bisacodyL, dextrose 50%, dextrose 50%, glucagon (human recombinant), hydrALAZINE, insulin aspart U-100, morphine, ondansetron, sodium chloride 0.9%    Review of Systems:    OBJECTIVE:     Vital Signs and IO:  Temp:  [97 °F (36.1 " °C)-98.7 °F (37.1 °C)]   Pulse:  [79-93]   Resp:  [0-27]   BP: (138-204)/()   SpO2:  [93 %-100 %]   I/O last 3 completed shifts:  In: 1720 [P.O.:1020; Other:500; IV Piggyback:200]  Out: 3850 [Urine:350; Other:3500]  Wt Readings from Last 5 Encounters:   03/02/23 58 kg (127 lb 13.9 oz)   02/08/23 59.9 kg (132 lb)   02/06/23 59.9 kg (132 lb)   02/05/23 59.9 kg (132 lb)   01/26/23 43.9 kg (96 lb 12.5 oz)     Body mass index is 23.39 kg/m².    Physical Exam  Constitutional:       General: Patient is not in acute distress.     Appearance: Patient is well-developed. She is not diaphoretic.   HENT:      Head: Normocephalic and atraumatic.      Mouth/Throat: Mucous membranes are moist.   Eyes:      General: No scleral icterus.     Pupils: Pupils are equal, round, and reactive to light.   Cardiovascular:      Rate and Rhythm: Normal rate and regular rhythm.   Pulmonary:      Effort: Pulmonary effort is normal. No respiratory distress.      Breath sounds: No stridor.   Abdominal:      General: There is no distension.      Palpations: Abdomen is soft.   Musculoskeletal:         General: No deformity. Normal range of motion.      Cervical back: Neck supple.   Skin:     General: Skin is warm and dry.      Findings: No rash present. No erythema.   Neurological:      Mental Status: Patient is alert and oriented to person, place, and time.      Cranial Nerves: No cranial nerve deficit.   Psychiatric:         Behavior: Behavior normal.     Laboratory:  Recent Labs   Lab 03/03/23  0547 03/04/23  0407 03/05/23  0417    138 135*   K 4.0 4.2 4.7   CL 96 98 104   CO2 28 26 23   BUN 42* 49* 26*   CREATININE 5.8* 6.6* 4.3*   * 126* 289*         Recent Labs   Lab 03/02/23  0919 03/03/23  0105 03/03/23  0547 03/04/23  0407 03/05/23  0417   CALCIUM 8.6*   < > 7.5* 7.3* 7.9*   ALBUMIN 3.3*  --  2.6* 2.6* 2.4*   MG 2.0  --   --   --   --     < > = values in this interval not displayed.         Recent Labs   Lab 07/08/22  0516    PTH, Intact 289.8 H         No results for input(s): POCTGLUCOSE in the last 168 hours.    Recent Labs   Lab 08/24/22  0218 12/23/22  1413 03/03/23  0547   Hemoglobin A1C 6.2 7.5 H 5.8         Recent Labs   Lab 03/03/23  0547 03/04/23  0407 03/05/23  0416   WBC 5.81 4.86 3.62*   HGB 7.6* 7.2* 7.7*   HCT 23.4* 21.8* 23.8*    122* 116*   MCV 89 90 91   MCHC 32.5 33.0 32.4   MONO 8.3  0.5 8.0  0.4 8.0  0.3         Recent Labs   Lab 03/03/23  0547 03/04/23  0407 03/05/23  0417   BILITOT 2.2* 2.4* 2.1*   PROT 5.8* 5.9* 5.6*   ALBUMIN 2.6* 2.6* 2.4*   ALKPHOS 338* 348* 372*   * 118* 99*   AST 60* 63* 58*         Recent Labs   Lab 10/24/22  0107 11/19/22  1210 02/05/23  0156   Color, UA Yellow Yellow Yellow   Appearance, UA Clear Clear Clear   pH, UA 8.0 8.0 >8.0 A   Specific [place], UA 1.020 1.025 1.020   Protein, UA 4+ 4+ 3+ A   Glucose, UA 4+ A 4+ A 2+ A   Ketones, UA Negative Negative 1+ A   Urobilinogen, UA Negative Negative Negative   Bilirubin (UA) Negative 1+ A 2+ A   Occult Blood UA 2+ A 2+ A 3+ A   Nitrite, UA Negative Negative Negative   RBC, UA 41 H 38 H 15 H   WBC, UA 24 H 25 H 1   Bacteria Negative Negative Rare   Hyaline Casts, UA 6 A 2 A 2 A         Recent Labs   Lab 02/07/23  0855 03/02/23  1225 03/02/23  1535   POC PH 7.397 7.377 7.383   POC PCO2 41.6 54.8 H 50.3 H   POC HCO3 25.6 32.2 H 30.0 H   POC PO2 89 113 H 104 H   POC SATURATED O2 97 98 98   POC BE 1 7 5   Sample ARTERIAL ARTERIAL ARTERIAL         Microbiology Results (last 7 days)       Procedure Component Value Units Date/Time    Blood culture #1 **CANNOT BE ORDERED STAT** [[id number]] Collected: 03/02/23 1015    Order Status: Completed Specimen: Blood from Peripheral, Antecubital, Right Updated: 03/04/23 1232     Blood Culture, Routine No Growth to date      No Growth to date      No Growth to date    Blood culture #2 **CANNOT BE ORDERED STAT** [[id number]] Collected: 03/02/23 1030    Order Status: Completed Specimen: Blood  from Peripheral, Antecubital, Left Updated: 03/04/23 1232     Blood Culture, Routine No Growth to date      No Growth to date      No Growth to date    Culture, Respiratory with Gram Stain [[id number]]     Order Status: Canceled Specimen: Respiratory from Sputum, Expectorated             ASSESSMENT/PLAN:     ESRD on HD TTS via AVF  Next HD per schedule.  Continue current dialysis prescription.  Renal diet - low K, low phos.  No IVs or BP checks on access and/or non-dominant arm.    Anemia of CKD  Hgb and HCT are low, no transfusion needed for now. Monitor for now.  Epoetin 10k w/HD  Hgb a little better today    MBD / Secondary HPT  Ca, Phos, PTH and vitamin D levels are acceptable.   Phos binders, vitamin D and analogues, calcimimetics will be given as needed.    HTN  BP seem controlled.   Tolerate asymptomatic HTN up to -160.  Continue home meds.  Low sodium diet.    Abdominal pain 2/2 intestinal intussusception  Management per primary team, Surgery.    Thank you for allowing us to participate in the care of your patient!   We will follow the patient and provide recommendations as needed.    Patient care time was spent personally by me on the following activities:     Obtaining a history.  Examination of patient.  Providing medical care at the patients bedside.  Developing a treatment plan with patient or surrogate and bedside caregivers.  Ordering and reviewing laboratory studies, radiographic studies, pulse oximetry.  Ordering and performing treatments and interventions.  Evaluation of patient's response to treatment.  Discussions with consultants while on the unit and immediately available to the patient.  Re-evaluation of the patient's condition.  Documentation in the medical record.     Geeta Kaur NP      [facility]  [address] [place] [address]  JEANMARIE [place] [address]    [phone] - tel  [phone] - fax    3/5/2023

## 2023-03-06 VITALS
OXYGEN SATURATION: 95 % | SYSTOLIC BLOOD PRESSURE: 133 MMHG | TEMPERATURE: 98 F | BODY MASS INDEX: 23.53 KG/M2 | HEART RATE: 78 BPM | DIASTOLIC BLOOD PRESSURE: 89 MMHG | RESPIRATION RATE: 17 BRPM | HEIGHT: 62 IN | WEIGHT: 127.88 LBS

## 2023-03-06 LAB
GLUCOSE SERPL-MCNC: 239 MG/DL (ref 70–110)
GLUCOSE SERPL-MCNC: 257 MG/DL (ref 70–110)

## 2023-03-06 PROCEDURE — 25000003 PHARM REV CODE 250: Performed by: INTERNAL MEDICINE

## 2023-03-06 PROCEDURE — C9113 INJ PANTOPRAZOLE SODIUM, VIA: HCPCS | Performed by: NURSE PRACTITIONER

## 2023-03-06 PROCEDURE — 63600175 PHARM REV CODE 636 W HCPCS: Performed by: INTERNAL MEDICINE

## 2023-03-06 PROCEDURE — 63600175 PHARM REV CODE 636 W HCPCS: Performed by: NURSE PRACTITIONER

## 2023-03-06 RX ADMIN — CARVEDILOL 25 MG: 25 TABLET, FILM COATED ORAL at 10:03

## 2023-03-06 RX ADMIN — HYDRALAZINE HYDROCHLORIDE 10 MG: 20 INJECTION INTRAMUSCULAR; INTRAVENOUS at 08:03

## 2023-03-06 RX ADMIN — LEVETIRACETAM 500 MG: 5 INJECTION INTRAVENOUS at 10:03

## 2023-03-06 RX ADMIN — INSULIN ASPART 2 UNITS: 100 INJECTION, SOLUTION INTRAVENOUS; SUBCUTANEOUS at 08:03

## 2023-03-06 RX ADMIN — LOSARTAN POTASSIUM 100 MG: 50 TABLET, FILM COATED ORAL at 10:03

## 2023-03-06 RX ADMIN — PANTOPRAZOLE SODIUM 40 MG: 40 INJECTION, POWDER, LYOPHILIZED, FOR SOLUTION INTRAVENOUS at 10:03

## 2023-03-06 RX ADMIN — HYDROCHLOROTHIAZIDE 12.5 MG: 12.5 TABLET ORAL at 10:03

## 2023-03-06 RX ADMIN — INSULIN ASPART 3 UNITS: 100 INJECTION, SOLUTION INTRAVENOUS; SUBCUTANEOUS at 12:03

## 2023-03-06 RX ADMIN — AMLODIPINE BESYLATE 10 MG: 5 TABLET ORAL at 10:03

## 2023-03-06 NOTE — NURSING
0700: report received, in bed with no distress  Not wearing monitor, states she isn't going to wear it and she took it off.  0815: medicated for elevated blood pressure.  1355: removed saline lock, monitor sent back this am    1405: went over all discharge instructions, answered all questions, waiting on ride.

## 2023-03-06 NOTE — PLAN OF CARE
Problem: Adult Inpatient Plan of Care  Goal: Plan of Care Review  Outcome: Ongoing, Progressing  Goal: Patient-Specific Goal (Individualized)  Outcome: Ongoing, Progressing  Goal: Absence of Hospital-Acquired Illness or Injury  Outcome: Ongoing, Progressing  Goal: Optimal Comfort and Wellbeing  Outcome: Ongoing, Progressing  Goal: Readiness for Transition of Care  Outcome: Ongoing, Progressing     Problem: Diabetes Comorbidity  Goal: Blood Glucose Level Within Targeted Range  Outcome: Ongoing, Progressing     Problem: Adjustment to Illness (Sepsis/Septic Shock)  Goal: Optimal Coping  Outcome: Ongoing, Progressing     Problem: Bleeding (Sepsis/Septic Shock)  Goal: Absence of Bleeding  Outcome: Ongoing, Progressing     Problem: Glycemic Control Impaired (Sepsis/Septic Shock)  Goal: Blood Glucose Level Within Desired Range  Outcome: Ongoing, Progressing     Problem: Infection Progression (Sepsis/Septic Shock)  Goal: Absence of Infection Signs and Symptoms  Outcome: Ongoing, Progressing     Problem: Nutrition Impaired (Sepsis/Septic Shock)  Goal: Optimal Nutrition Intake  Outcome: Ongoing, Progressing     Problem: Infection  Goal: Absence of Infection Signs and Symptoms  Outcome: Ongoing, Progressing     Problem: Skin Injury Risk Increased  Goal: Skin Health and Integrity  Outcome: Ongoing, Progressing     Problem: Device-Related Complication Risk (Hemodialysis)  Goal: Safe, Effective Therapy Delivery  Outcome: Ongoing, Progressing     Problem: Hemodynamic Instability (Hemodialysis)  Goal: Effective Tissue Perfusion  Outcome: Ongoing, Progressing     Problem: Infection (Hemodialysis)  Goal: Absence of Infection Signs and Symptoms  Outcome: Ongoing, Progressing

## 2023-03-06 NOTE — PLAN OF CARE
Hospital follow up appt scheduled and added to AVS.     Discharge Summary and orders sent via [email or web address] Fax to [address] in [place] to inform of patient discharge so regular dialysis schedule may be resumed.     Discharge orders and chart reviewed with no further post-acute discharge needs identified at this time.  At this time, patient is cleared for discharge from Case Management standpoint.        03/06/23 1324   Final Note   Assessment Type Final Discharge Note  (Simultaneous filing. User may not have seen previous data.)   Anticipated Discharge Disposition Home  (Simultaneous filing. User may not have seen previous data.)   What phone number can be called within the next 1-3 days to see how you are doing after discharge? [phone]   Hospital Resources/Appts/Education Provided Appointments scheduled and added to AVS   Post-Acute Status   Post-Acute Authorization Dialysis   Diaylsis Status Set-up Complete/Auth obtained   Discharge Delays None known at this time  (Simultaneous filing. User may not have seen previous data.)

## 2023-03-06 NOTE — PLAN OF CARE
Problem: Adult Inpatient Plan of Care  Goal: Plan of Care Review  Outcome: Ongoing, Progressing  Goal: Patient-Specific Goal (Individualized)  Outcome: Ongoing, Progressing  Goal: Absence of Hospital-Acquired Illness or Injury  Outcome: Ongoing, Progressing  Goal: Optimal Comfort and Wellbeing  Outcome: Ongoing, Progressing  Goal: Readiness for Transition of Care  Outcome: Ongoing, Progressing     Problem: Diabetes Comorbidity  Goal: Blood Glucose Level Within Targeted Range  Outcome: Ongoing, Progressing     Problem: Adjustment to Illness (Sepsis/Septic Shock)  Goal: Optimal Coping  Outcome: Ongoing, Progressing     Problem: Infection Progression (Sepsis/Septic Shock)  Goal: Absence of Infection Signs and Symptoms  Outcome: Ongoing, Progressing     Problem: Glycemic Control Impaired (Sepsis/Septic Shock)  Goal: Blood Glucose Level Within Desired Range  Outcome: Ongoing, Progressing     Problem: Infection  Goal: Absence of Infection Signs and Symptoms  Outcome: Ongoing, Progressing     Problem: Infection (Hemodialysis)  Goal: Absence of Infection Signs and Symptoms  Outcome: Ongoing, Progressing

## 2023-03-06 NOTE — PLAN OF CARE
Discharge orders and chart reviewed with no further post-acute discharge needs identified at this time.  At this time, patient is cleared for discharge from Case Management standpoint.        03/06/23 1324   Final Note   Assessment Type Final Discharge Note  (Simultaneous filing. User may not have seen previous data.)   Anticipated Discharge Disposition Home  (Simultaneous filing. User may not have seen previous data.)   What phone number can be called within the next 1-3 days to see how you are doing after discharge? [phone]   Post-Acute Status   Discharge Delays None known at this time  (Simultaneous filing. User may not have seen previous data.)

## 2023-03-06 NOTE — PROGRESS NOTES
Nephrology Progress Note        Patient Name: Tabby Howard  MRN: [id number]    Patient Class: IP- Inpatient   Admission Date: 3/2/2023  Length of Stay: 4 days  Date of Service: 3/6/2023    Attending Physician: Xiang Vasquez MD  Primary Care Provider: Primary Doctor No    Reason for Consult: esrd/anemia    SUBJECTIVE:     HPI: 33F with ESRD on HD TTS by Dr. Benitez/Henry/Amber, diabetes, gastroparesis, chronic pain, congestive heart failure, history of SVT, sickle cell trait, hypertensive emergencies, presents emergency department with nausea, vomiting, abdominal pain, diarrhea. Her last dialysis was on Tuesday and she has been reasonably compliant. Since last night she is had multiple episodes of vomiting approximally 10, non-bloody, non-bilious, and about 5 episodes of loose watery stool, accompanied by some lower abdominal pain. She can not keep anything down. Patient has had a cholecystectomy and  in the past.    3/3 VSS, more awake. No urgent HD needs, monitor today, re-evaluate in AM. Appreciate Surgery, Pulm input.  3/4 VSS. Seen on HD today. Will start EPo.  3/5  lab results reviewed, pressures elevated--po meds just resumed yesterday.  No specific complaints, just doesn't feel well.  3/6  no acute events.  AFVSS    Past Medical History:   Diagnosis Date    CKD (chronic kidney disease), stage IV 2022    Diabetes mellitus due to underlying condition with unspecified complications 2022    Gastroparesis 2022    Heart failure with preserved ejection fraction 2022    EF 55% on 3/22    History of gastroesophageal reflux (GERD)     History of supraventricular tachycardia     Hyperkalemia 2022    Hypertensive emergency 2022    Sickle cell trait 2022    Type 2 diabetes mellitus      Past Surgical History:   Procedure Laterality Date     SECTION      x 3    COLONOSCOPY      COLONOSCOPY N/A 2022    Procedure: COLONOSCOPY;  Surgeon: Jagdeep Cedeno MD;  Location:  [facility] ENDO;  Service: Endoscopy;  Laterality: N/A;    ESOPHAGOGASTRODUODENOSCOPY N/A 10/18/2019    Procedure: ESOPHAGOGASTRODUODENOSCOPY (EGD);  Surgeon: Gianluca Mendez MD;  Location: [facility];  Service: Endoscopy;  Laterality: N/A;    ESOPHAGOGASTRODUODENOSCOPY N/A 08/24/2022    Procedure: EGD (ESOPHAGOGASTRODUODENOSCOPY);  Surgeon: Micky Paredes III, MD;  Location: [facility];  Service: Endoscopy;  Laterality: N/A;    ESOPHAGOGASTRODUODENOSCOPY N/A 12/5/2022    Procedure: EGD (ESOPHAGOGASTRODUODENOSCOPY);  Surgeon: Marcelo Zhong MD;  Location: [facility];  Service: Endoscopy;  Laterality: N/A;    LAPAROSCOPIC CHOLECYSTECTOMY N/A 07/30/2022    Procedure: CHOLECYSTECTOMY, LAPAROSCOPIC;  Surgeon: Grey Perez MD;  Location: [facility] OR;  Service: General;  Laterality: N/A;    PLACEMENT OF DUAL-LUMEN VASCULAR CATHETER Left 07/12/2022    Procedure: INSERTION-CATHETER-JSOEPH;  Surgeon: Dionte Gan MD;  Location: [facility] OR;  Service: General;  Laterality: Left;    PLACEMENT OF DUAL-LUMEN VASCULAR CATHETER Right 07/26/2022    Procedure: INSERTION-CATHETER-Hemosplit;  Surgeon: Dionte Gan MD;  Location: [facility] OR;  Service: General;  Laterality: Right;     Family History   Problem Relation Age of Onset    Diabetes Mother     Diabetes Father      Social History     Tobacco Use    Smoking status: Never    Smokeless tobacco: Never   Substance Use Topics    Alcohol use: Not Currently    Drug use: No       Review of patient's allergies indicates:   Allergen Reactions    Penicillins Hives       Outpatient meds:  No current facility-administered medications on file prior to encounter.     Current Outpatient Medications on File Prior to Encounter   Medication Sig Dispense Refill    albuterol (PROVENTIL/VENTOLIN HFA) 90 mcg/actuation inhaler Inhale 2 puffs into the lungs every 6 (six) hours as needed for Wheezing. Rescue 18 g 3    amLODIPine (NORVASC) 10 MG tablet Take 1 tablet (10 mg total) by mouth once daily. 30  tablet 11    amLODIPine (NORVASC) 10 MG tablet Take 1 tablet every day by oral route for 30 days. (Patient taking differently: Take 10 mg by mouth once daily.) 30 tablet 2    apixaban (ELIQUIS) 5 mg Tab Take 5 mg by mouth 2 (two) times daily.      apixaban (ELIQUIS) 5 mg Tab Take 1 tablet (5 mg total) by mouth 2 (two) times daily. 60 tablet 2    apixaban (ELIQUIS) 5 mg Tab Take 1 tablet twice a day by oral route for 30 days. (Patient taking differently: Take 5 mg by mouth 2 (two) times daily.) 60 tablet 2    BINAXNOW COVID-19 AG SELF TEST Kit Take 1 Dose by mouth once.      carvediloL (COREG) 25 MG tablet Take 1 tablet twice a day by oral route for 30 days. (Patient taking differently: Take 25 mg by mouth 2 (two) times daily.) 60 tablet 2    cloNIDine 0.2 mg/24 hr td ptwk (CATAPRES) 0.2 mg/24 hr Apply 1 patch to skin once every week. (Patient taking differently: Place 1 patch onto the skin once a week.) 4 patch 2    dicyclomine (BENTYL) 10 MG capsule Take 1 capsule (10 mg total) by mouth 3 (three) times daily. 90 capsule 0    dicyclomine (BENTYL) 10 MG capsule Take 1 capsule 3 times a day by oral route for 30 days. (Patient taking differently: Take 10 mg by mouth 3 (three) times daily.) 90 capsule 2    ferrous sulfate 325 (65 FE) MG EC tablet Take 325 mg by mouth once daily.      FLUoxetine 20 MG capsule Take 1 capsule (20 mg total) by mouth once daily. 30 capsule 11    FLUoxetine 20 MG capsule Take 1 capsule every day by oral route for 30 days. (Patient taking differently: Take 20 mg by mouth once daily.) 30 capsule 2    furosemide (LASIX) 40 MG tablet Take 40 mg by mouth 2 (two) times a day.      gabapentin (NEURONTIN) 100 MG capsule Take 1 capsule (100 mg total) by mouth 2 (two) times daily. 90 capsule 2    gabapentin (NEURONTIN) 100 MG capsule Take 1 capsule by mouth 3 times daily (Patient taking differently: Take 100 mg by mouth 3 (three) times daily.) 90 capsule 2    hydrALAZINE (APRESOLINE) 100 MG tablet Take  1 tablet twice a day by oral route for 30 days. (Patient taking differently: Take 100 mg by mouth 2 (two) times daily.) 60 tablet 2    HYDROcodone-acetaminophen (NORCO)  mg per tablet Take 1 tablet by mouth every 4 (four) hours as needed for Pain.      HYDROmorphone (DILAUDID) 4 MG tablet Take 4 mg by mouth every 12 (twelve) hours as needed for Pain.      insulin aspart U-100 (NOVOLOG FLEXPEN U-100 INSULIN) 100 unit/mL (3 mL) InPn pen Inject 10 units under the skin 3 times a day before meals. (Patient taking differently: Inject 10 Units into the skin 3 (three) times daily with meals.) 15 mL 5    insulin aspart U-100 (NOVOLOG) 100 unit/mL (3 mL) InPn pen Inject 1-10 Units into the skin before meals and at bedtime as needed (Hyperglycemia). Max daily dose 40 units. 3 mL 6    insulin detemir U-100 (LEVEMIR FLEXTOUCH U-100 INSULN) 100 unit/mL (3 mL) InPn pen Inject 18 units every day by subcutaneous route in the evening. (Patient taking differently: Inject 18 Units into the skin every evening.) 15 mL 5    insulin glargine 100 units/mL SubQ pen Inject 16 Units into the skin every evening.      isosorbide mononitrate (IMDUR) 60 MG 24 hr tablet Take 2 tablets (120 mg total) by mouth once daily. 30 tablet 11    isosorbide mononitrate (IMDUR) 60 MG 24 hr tablet Take 1 tablet every day by oral route for 30 days. (Patient taking differently: Take 60 mg by mouth once daily.) 30 tablet 2    levETIRAcetam (KEPPRA) 500 MG Tab Take 1 tablet (500 mg total) by mouth 2 (two) times daily. 60 tablet 11    levETIRAcetam (KEPPRA) 500 MG Tab Take 1 tablet twice a day by oral route for 30 days. (Patient taking differently: Take 500 mg by mouth once daily.) 60 tablet 2    losartan-hydrochlorothiazide 100-12.5 mg (HYZAAR) 100-12.5 mg Tab Take 1 tablet by mouth once daily.      mirtazapine (REMERON SOL-TAB) 15 MG disintegrating tablet Take 1 tablet (15 mg total) by mouth nightly. 90 tablet 0    ondansetron (ZOFRAN) 4 MG tablet Take 1  "tablet (4 mg total) by mouth every 8 (eight) hours as needed for Nausea. 60 tablet 2    ondansetron (ZOFRAN-ODT) 4 MG TbDL Place1 tablet on the tongue every 8 hours as needed (Patient taking differently: Take 4 mg by mouth every 6 (six) hours as needed.) 24 tablet 0    pantoprazole (PROTONIX) 40 MG tablet Take 40 mg by mouth once daily.      pantoprazole (PROTONIX) 40 MG tablet Take 1 tablet every day by oral route for 30 days. (Patient taking differently: Take 40 mg by mouth once daily.) 30 tablet 2    pen needle, diabetic (BD ULTRA-FINE MAGALIE PEN NEEDLE) 32 gauge x 5/32" Ndle Inject into the skin 5 times per day with insulin 100 each 12    promethazine (PHENERGAN) 25 MG suppository Place 1 suppository (25 mg total) rectally every 6 (six) hours as needed for Nausea. 10 suppository 0    torsemide (DEMADEX) 100 MG Tab Take 100 mg by mouth once daily.      [DISCONTINUED] atenoloL (TENORMIN) 50 MG tablet Take 1 tablet (50 mg total) by mouth every other day. 45 tablet 3    [DISCONTINUED] omeprazole (PRILOSEC) 20 MG capsule Take 2 capsules (40 mg total) by mouth once daily. for 10 days 20 capsule 0       Scheduled meds:   sodium chloride 0.9%   Intravenous Once    amLODIPine  10 mg Oral Daily    carvediloL  25 mg Oral BID    chlorhexidine  15 mL Mouth/Throat BID    cloNIDine 0.2 mg/24 hr td ptwk  1 patch Transdermal Weekly    epoetin teresa-epbx  10,000 Units Subcutaneous Every Tues, Thurs, Sat    hydrALAZINE  100 mg Oral BID    losartan  100 mg Oral Daily    And    hydroCHLOROthiazide  12.5 mg Oral Daily    levetiracetam IV  500 mg Intravenous Q12H    mupirocin   Nasal BID    pantoprazole  40 mg Intravenous Daily    sodium chloride 0.9%  30 mL/kg Intravenous Once       Infusions:        PRN meds:  acetaminophen, acetaminophen, bisacodyL, dextrose 50%, dextrose 50%, glucagon (human recombinant), hydrALAZINE, insulin aspart U-100, morphine, ondansetron, sodium chloride 0.9%    Review of Systems:    OBJECTIVE:     Vital Signs " and IO:  Temp:  [97.5 °F (36.4 °C)-98.8 °F (37.1 °C)]   Pulse:  [76-80]   Resp:  [16-19]   BP: (114-180)/()   SpO2:  [93 %-98 %]   I/O last 3 completed shifts:  In: -   Out: 250 [Urine:250]  Wt Readings from Last 5 Encounters:   03/02/23 58 kg (127 lb 13.9 oz)   02/08/23 59.9 kg (132 lb)   02/06/23 59.9 kg (132 lb)   02/05/23 59.9 kg (132 lb)   01/26/23 43.9 kg (96 lb 12.5 oz)     Body mass index is 23.39 kg/m².    Physical Exam  Constitutional:       General: Patient is not in acute distress.     Appearance: Patient is well-developed. She is not diaphoretic.   HENT:      Head: Normocephalic and atraumatic.      Mouth/Throat: Mucous membranes are moist.   Eyes:      General: No scleral icterus.     Pupils: Pupils are equal, round, and reactive to light.   Cardiovascular:      Rate and Rhythm: Normal rate and regular rhythm.   Pulmonary:      Effort: Pulmonary effort is normal. No respiratory distress.      Breath sounds: No stridor.   Abdominal:      General: There is no distension.      Palpations: Abdomen is soft.   Musculoskeletal:         General: No deformity. Normal range of motion.      Cervical back: Neck supple.   Skin:     General: Skin is warm and dry.      Findings: No rash present. No erythema.   Neurological:      Mental Status: Patient is alert and oriented to person, place, and time.      Cranial Nerves: No cranial nerve deficit.   Psychiatric:         Behavior: Behavior normal.     Laboratory:  Recent Labs   Lab 03/03/23  0547 03/04/23  0407 03/05/23  0417    138 135*   K 4.0 4.2 4.7   CL 96 98 104   CO2 28 26 23   BUN 42* 49* 26*   CREATININE 5.8* 6.6* 4.3*   * 126* 289*         Recent Labs   Lab 03/02/23  0919 03/03/23  0105 03/03/23  0547 03/04/23  0407 03/05/23  0417   CALCIUM 8.6*   < > 7.5* 7.3* 7.9*   ALBUMIN 3.3*  --  2.6* 2.6* 2.4*   MG 2.0  --   --   --   --     < > = values in this interval not displayed.         Recent Labs   Lab 07/08/22  0516   PTH, Intact 289.8 H          No results for input(s): POCTGLUCOSE in the last 168 hours.    Recent Labs   Lab 08/24/22  0218 12/23/22  1413 03/03/23  0547   Hemoglobin A1C 6.2 7.5 H 5.8         Recent Labs   Lab 03/03/23  0547 03/04/23  0407 03/05/23  0416   WBC 5.81 4.86 3.62*   HGB 7.6* 7.2* 7.7*   HCT 23.4* 21.8* 23.8*    122* 116*   MCV 89 90 91   MCHC 32.5 33.0 32.4   MONO 8.3  0.5 8.0  0.4 8.0  0.3         Recent Labs   Lab 03/03/23  0547 03/04/23  0407 03/05/23  0417   BILITOT 2.2* 2.4* 2.1*   PROT 5.8* 5.9* 5.6*   ALBUMIN 2.6* 2.6* 2.4*   ALKPHOS 338* 348* 372*   * 118* 99*   AST 60* 63* 58*         Recent Labs   Lab 10/24/22  0107 11/19/22  1210 02/05/23  0156   Color, UA Yellow Yellow Yellow   Appearance, UA Clear Clear Clear   pH, UA 8.0 8.0 >8.0 A   Specific [place], UA 1.020 1.025 1.020   Protein, UA 4+ 4+ 3+ A   Glucose, UA 4+ A 4+ A 2+ A   Ketones, UA Negative Negative 1+ A   Urobilinogen, UA Negative Negative Negative   Bilirubin (UA) Negative 1+ A 2+ A   Occult Blood UA 2+ A 2+ A 3+ A   Nitrite, UA Negative Negative Negative   RBC, UA 41 H 38 H 15 H   WBC, UA 24 H 25 H 1   Bacteria Negative Negative Rare   Hyaline Casts, UA 6 A 2 A 2 A         Recent Labs   Lab 02/07/23  0855 03/02/23  1225 03/02/23  1535   POC PH 7.397 7.377 7.383   POC PCO2 41.6 54.8 H 50.3 H   POC HCO3 25.6 32.2 H 30.0 H   POC PO2 89 113 H 104 H   POC SATURATED O2 97 98 98   POC BE 1 7 5   Sample ARTERIAL ARTERIAL ARTERIAL         Microbiology Results (last 7 days)       Procedure Component Value Units Date/Time    Blood culture #1 **CANNOT BE ORDERED STAT** [[id number]] Collected: 03/02/23 1015    Order Status: Completed Specimen: Blood from Peripheral, Antecubital, Right Updated: 03/05/23 1232     Blood Culture, Routine No Growth to date      No Growth to date      No Growth to date      No Growth to date    Blood culture #2 **CANNOT BE ORDERED STAT** [[id number]] Collected: 03/02/23 1030    Order Status: Completed Specimen: Blood  from Peripheral, Antecubital, Left Updated: 03/05/23 1232     Blood Culture, Routine No Growth to date      No Growth to date      No Growth to date      No Growth to date    Culture, Respiratory with Gram Stain [[id number]]     Order Status: Canceled Specimen: Respiratory from Sputum, Expectorated             ASSESSMENT/PLAN:     ESRD on HD TTS via AVF  Next HD per schedule.  Continue current dialysis prescription.  Renal diet - low K, low phos.  No IVs or BP checks on access and/or non-dominant arm.    Anemia of CKD  Hgb and HCT are low, no transfusion needed for now. Monitor for now.  Epoetin 10k w/HD  Hgb a little better today    MBD / Secondary HPT  Ca, Phos, PTH and vitamin D levels are acceptable.   Phos binders, vitamin D and analogues, calcimimetics will be given as needed.    HTN  BP seem controlled.   Tolerate asymptomatic HTN up to -160.  Continue home meds.  Low sodium diet.    Abdominal pain 2/2 intestinal intussusception  Management per primary team, Surgery.    Thank you for allowing us to participate in the care of your patient!   We will follow the patient and provide recommendations as needed.    Patient care time was spent personally by me on the following activities:     Obtaining a history.  Examination of patient.  Providing medical care at the patients bedside.  Developing a treatment plan with patient or surrogate and bedside caregivers.  Ordering and reviewing laboratory studies, radiographic studies, pulse oximetry.  Ordering and performing treatments and interventions.  Evaluation of patient's response to treatment.  Discussions with consultants while on the unit and immediately available to the patient.  Re-evaluation of the patient's condition.  Documentation in the medical record.     Hugh Figueroa MD      [facility]  [address] [place] [address]  [place] [address]    [phone] - tel  [phone] - fax    3/6/2023

## 2023-03-06 NOTE — DISCHARGE SUMMARY
[facility]  Discharge Summary  Patient Name: Tabby Howard MRN: [id number]   Patient Class: IP- Inpatient  Length of Stay: 4   Admission Date: 3/2/2023  8:50 AM Attending Physician: Xiang Vasquez MD   Primary Care Provider: Primary Doctor No Face-to-Face encounter date: 03/06/2023   Chief Complaint: Vomiting (Vomiting, onset this morning. Dialysis patient TTS, dialyzed Tuesday. )    Date of Discharge: 3/6/2023  Discharge Disposition:  Home or Self Care  Condition: Stable       Reason for Hospitalization     Intersussusception      Patient Active Problem List   Diagnosis    SVT (supraventricular tachycardia)    Gastritis    Gallstones    Homelessness    Gastroparesis - suspected, unconfirmed    Sickle cell trait    Seizure disorder    Hydroureter on left    Nephrotic range proteinuria    Pericardial effusion    ESRD (end stage renal disease) on dialysis    Deep vein thrombosis (DVT) of non-extremity vein    Uncontrolled hypertension    Malnutrition of moderate degree    Thrombocytopenia    Vision loss, right eye    Anemia of chronic kidney failure, stage 5    Stable proliferative diabetic retinopathy of both eyes associated with type 2 diabetes mellitus    Vitreous hemorrhage, both eyes    Cortical cataract of both eyes    Intractable generalized abdominal pain    Adjustment disorder    Drug-seeking behavior    DM (diabetes mellitus)    Demand ischemia    Intussusception intestine    Severe sepsis    Severe diabetic hypoglycemia    Congestive heart failure with left ventricular diastolic dysfunction       Brief History of Present Illness   HPI: Patient is a 33-year-old  female with history of end-stage renal disease on dialysis Tuesday/Thursday/Saturday, sickle cell trait, hypertension, CHF EF 55, SVT, diabetes type 2, gastroparesis, seizure disorder.  Presents to the ED with complaints of nausea, multiple episodes of vomiting, mid abdominal pain started yesterday.  Well assessed in the  "emergency department initial glucose 141 however became hypoglycemic 20,28,20. She was started on a D10 drip and required 3 amps D50 with no long-term improvement.  CT abdomen pending.  On assessment patient is very drowsy due to hypoglycemia and difficult receive detailed information.  He reports no fever arrival, hematuria, chest pain, dyspnea, chills.     ED physician spoke surgeon with Dr. Marcos who will see patient.  Patient will be admitted ICU for sepsis, intussusception, severe hypoglycemia.     CT abdomen and pelvis results.      IMPRESSION:     1.  Small focus of intussusception involving the proximal jejunum without evidence of obstruction. This most likely reflects transient intussusception. Clinical correlation recommended.  2.  Mild free fluid in the pelvic cul-de-sac and left pericolic gutter of undetermined significance.  3.  Additional incidental observations as described.  Patient is a critical care patient       Hospital Course By Problem with Pertinent Findings   Patient presented to hospital with nausea,voiting and abdominal pain.  Found to be severely hypoglycemic and started on D10 drip.   Surgery evaluated and no intervention was indicated. Managed conservatively. Responded well to conservative management. Transferred out of ICU and now tolerating diet.   Ok for discharge today.          Patient was seen and examined on the date of discharge and determined to be suitable for discharge.    Physical Exam  /89   Pulse 78   Temp 98 °F (36.7 °C) (Oral)   Resp 17   Ht 5' 2" (1.575 m)   Wt 58 kg (127 lb 13.9 oz)   SpO2 95%   Breastfeeding No   BMI 23.39 kg/m²   Vitals reviewed.      Following labs were Reviewed   No results for input(s): WBC, HGB, HCT, PLT, GLUCOSE, CALCIUM, ALBUMIN, PROT, NA, K, CO2, CL, BUN, CREATININE, ALKPHOS, ALT, AST, BILITOT in the last 24 hours.  No results found for: POCTGLUCOSE         Microbiology Results (last 7 days)       Procedure Component Value " Units Date/Time    Blood culture #1 **CANNOT BE ORDERED STAT** [[id number]] Collected: 03/02/23 1015    Order Status: Completed Specimen: Blood from Peripheral, Antecubital, Right Updated: 03/06/23 1232     Blood Culture, Routine No Growth to date      No Growth to date      No Growth to date      No Growth to date      No Growth to date    Blood culture #2 **CANNOT BE ORDERED STAT** [[id number]] Collected: 03/02/23 1030    Order Status: Completed Specimen: Blood from Peripheral, Antecubital, Left Updated: 03/06/23 1232     Blood Culture, Routine No Growth to date      No Growth to date      No Growth to date      No Growth to date      No Growth to date    Culture, Respiratory with Gram Stain [[id number]]     Order Status: Canceled Specimen: Respiratory from Sputum, Expectorated           X-Ray Chest PA And Lateral   Final Result      X-Ray Abdomen Flat And Erect   Final Result      CT Abdomen Pelvis With Contrast   Final Result      CTA Chest Non-Coronary (PE Studies)   Final Result      CT Head Without Contrast   Final Result      X-Ray Chest 1 View   Final Result          No results found for this or any previous visit.      Consultants and Procedures   Consultants:  Consults (From admission, onward)          Status Ordering Provider     Inpatient consult to Pulmonology  Once        Provider:  Iveth Berman MD    Completed LANEY POPE     Inpatient consult to Nephrology  Once        Provider:  Gianluca Beltrán MD    Completed EVANGELINA VILLARREAL     Inpatient consult to General surgery  Once        Provider:  Carlos Thurman Jr., MD    Completed RIGO ORTIZ     Inpatient consult to Hospitalist  Once        Provider:  (Not yet assigned)    Acknowledged RIGO ORTIZ              Discharge Information:   Diet:  Renal, diabetic diet.     Physical Activity:  Activity as tolerated    Instructions:  1. Take all medications as prescribed  2. Keep all follow-up appointments      Follow-Up  "Appointments:  Please call your primary care physician to schedule an appointment in 1 week time.            Pending laboratory work/Tests to be performed/followed by the Primary care Physician:    The patient was discharged in the care of her parents//wife/family/caregiver, with discharge instructions were reviewed in written and verbal form. All pertinent questions were discussed and prescriptions were provided. The importance of making follow up appointments and compliance of medications has been stressed repeatedly. The patient will follow up in 1 week or sooner as needed with the PCP, and the patient is on board with the plan. Upon discharge, patient needs to be on following medications.    Discharge Medications:     Medication List        CONTINUE taking these medications      albuterol 90 mcg/actuation inhaler  Commonly known as: PROVENTIL/VENTOLIN HFA  Inhale 2 puffs into the lungs every 6 (six) hours as needed for Wheezing. Rescue     * amLODIPine 10 MG tablet  Commonly known as: NORVASC  Take 1 tablet (10 mg total) by mouth once daily.     * ELIQUIS 5 mg Tab  Generic drug: apixaban  Take 1 tablet (5 mg total) by mouth 2 (two) times daily.     * apixaban 5 mg Tab  Commonly known as: ELIQUIS     BD ULTRA-FINE MAGALIE PEN NEEDLE 32 gauge x 5/32" Ndle  Generic drug: pen needle, diabetic  Inject into the skin 5 times per day with insulin     BINAXNOW COVID-19 AG SELF TEST Kit  Generic drug: COVID-19 antigen test     * dicyclomine 10 MG capsule  Commonly known as: BENTYL  Take 1 capsule (10 mg total) by mouth 3 (three) times daily.     ferrous sulfate 325 (65 FE) MG EC tablet     * FLUoxetine 20 MG capsule  Take 1 capsule (20 mg total) by mouth once daily.     furosemide 40 MG tablet  Commonly known as: LASIX     * gabapentin 100 MG capsule  Commonly known as: NEURONTIN  Take 1 capsule (100 mg total) by mouth 2 (two) times daily.     HYDROcodone-acetaminophen  mg per tablet  Commonly known as: NORCO   "   HYDROmorphone 4 MG tablet  Commonly known as: DILAUDID     insulin glargine 100 units/mL SubQ pen     * isosorbide mononitrate 60 MG 24 hr tablet  Commonly known as: IMDUR  Take 2 tablets (120 mg total) by mouth once daily.     * levETIRAcetam 500 MG Tab  Commonly known as: KEPPRA  Take 1 tablet (500 mg total) by mouth 2 (two) times daily.     losartan-hydrochlorothiazide 100-12.5 mg 100-12.5 mg Tab  Commonly known as: HYZAAR     mirtazapine 15 MG disintegrating tablet  Commonly known as: REMERON SOL-TAB  Take 1 tablet (15 mg total) by mouth nightly.     * NovoLOG FlexPen U-100 Insulin 100 unit/mL (3 mL) Inpn pen  Generic drug: insulin aspart U-100  Inject 1-10 Units into the skin before meals and at bedtime as needed (Hyperglycemia). Max daily dose 40 units.     ondansetron 4 MG tablet  Commonly known as: ZOFRAN  Take 1 tablet (4 mg total) by mouth every 8 (eight) hours as needed for Nausea.     * pantoprazole 40 MG tablet  Commonly known as: PROTONIX     promethazine 25 MG suppository  Commonly known as: PHENERGAN  Place 1 suppository (25 mg total) rectally every 6 (six) hours as needed for Nausea.     torsemide 100 MG Tab  Commonly known as: DEMADEX           * This list has 10 medication(s) that are the same as other medications prescribed for you. Read the directions carefully, and ask your doctor or other care provider to review them with you.                ASK your doctor about these medications      * amLODIPine 10 MG tablet  Commonly known as: NORVASC  Take 1 tablet every day by oral route for 30 days.     * ELIQUIS 5 mg Tab  Generic drug: apixaban  Take 1 tablet twice a day by oral route for 30 days.     carvediloL 25 MG tablet  Commonly known as: COREG  Take 1 tablet twice a day by oral route for 30 days.     cloNIDine 0.2 mg/24 hr td ptwk 0.2 mg/24 hr  Commonly known as: CATAPRES  Apply 1 patch to skin once every week.     * dicyclomine 10 MG capsule  Commonly known as: BENTYL  Take 1 capsule 3 times  a day by oral route for 30 days.     * FLUoxetine 20 MG capsule  Take 1 capsule every day by oral route for 30 days.     * gabapentin 100 MG capsule  Commonly known as: NEURONTIN  Take 1 capsule by mouth 3 times daily     hydrALAZINE 100 MG tablet  Commonly known as: APRESOLINE  Take 1 tablet twice a day by oral route for 30 days.     * isosorbide mononitrate 60 MG 24 hr tablet  Commonly known as: IMDUR  Take 1 tablet every day by oral route for 30 days.     LEVEMIR FLEXTOUCH U-100 INSULN 100 unit/mL (3 mL) Inpn pen  Generic drug: insulin detemir U-100  Inject 18 units every day by subcutaneous route in the evening.     * levETIRAcetam 500 MG Tab  Commonly known as: KEPPRA  Take 1 tablet twice a day by oral route for 30 days.     * NovoLOG FlexPen U-100 Insulin 100 unit/mL (3 mL) Inpn pen  Generic drug: insulin aspart U-100  Inject 10 units under the skin 3 times a day before meals.     ondansetron 4 MG Tbdl  Commonly known as: ZOFRAN-ODT  Place1 tablet on the tongue every 8 hours as needed     * pantoprazole 40 MG tablet  Commonly known as: PROTONIX  Take 1 tablet every day by oral route for 30 days.           * This list has 9 medication(s) that are the same as other medications prescribed for you. Read the directions carefully, and ask your doctor or other care provider to review them with you.                    I spent 45 minutes preparing the discharge including reviewing records from previous encounters, preparation of discharge summary, assessing and final examination of the patient, discharge medicine reconciliation, discussing plan of care, follow up and education and prescriptions.       Xiang Kruse  [facility] Hospitalist  03/06/2023

## 2023-03-07 LAB
BACTERIA BLD CULT: NORMAL
BACTERIA BLD CULT: NORMAL

## 2023-03-08 ENCOUNTER — TELEPHONE (OUTPATIENT)
Dept: TRANSPLANT | Facility: CLINIC | Age: 33
End: 2023-03-08
Payer: MEDICAID

## 2023-03-09 DIAGNOSIS — Z76.82 ORGAN TRANSPLANT CANDIDATE: Primary | ICD-10-CM

## 2023-03-10 ENCOUNTER — HOSPITAL ENCOUNTER (OUTPATIENT)
Facility: HOSPITAL | Age: 33
Discharge: HOME | End: 2023-03-13
Attending: EMERGENCY MEDICINE | Admitting: INTERNAL MEDICINE
Payer: MEDICAID

## 2023-03-10 DIAGNOSIS — T68.XXXA HYPOTHERMIA: ICD-10-CM

## 2023-03-10 DIAGNOSIS — R10.84 INTRACTABLE GENERALIZED ABDOMINAL PAIN: Primary | ICD-10-CM

## 2023-03-10 DIAGNOSIS — E16.2 LOW BLOOD SUGAR: ICD-10-CM

## 2023-03-10 PROBLEM — Z78.9 FREQUENT HOSPITAL ADMISSIONS: Status: ACTIVE | Noted: 2023-03-10

## 2023-03-10 LAB
ALBUMIN SERPL BCP-MCNC: 3.1 G/DL (ref 3.5–5.2)
ALP SERPL-CCNC: 499 U/L (ref 55–135)
ALT SERPL W/O P-5'-P-CCNC: 89 U/L (ref 10–44)
ANION GAP SERPL CALC-SCNC: 10 MMOL/L (ref 8–16)
AST SERPL-CCNC: 64 U/L (ref 10–40)
BASOPHILS # BLD AUTO: 0.02 K/UL (ref 0–0.2)
BASOPHILS NFR BLD: 0.4 % (ref 0–1.9)
BILIRUB SERPL-MCNC: 1.6 MG/DL (ref 0.1–1)
BUN SERPL-MCNC: 31 MG/DL (ref 6–20)
CALCIUM SERPL-MCNC: 9 MG/DL (ref 8.7–10.5)
CHLORIDE SERPL-SCNC: 100 MMOL/L (ref 95–110)
CO2 SERPL-SCNC: 31 MMOL/L (ref 23–29)
CREAT SERPL-MCNC: 3.7 MG/DL (ref 0.5–1.4)
DIFFERENTIAL METHOD: ABNORMAL
EOSINOPHIL # BLD AUTO: 0.1 K/UL (ref 0–0.5)
EOSINOPHIL NFR BLD: 1.4 % (ref 0–8)
ERYTHROCYTE [DISTWIDTH] IN BLOOD BY AUTOMATED COUNT: 17.9 % (ref 11.5–14.5)
EST. GFR  (NO RACE VARIABLE): 15.8 ML/MIN/1.73 M^2
GLUCOSE SERPL-MCNC: 100 MG/DL (ref 70–110)
GLUCOSE SERPL-MCNC: 126 MG/DL (ref 70–110)
GLUCOSE SERPL-MCNC: 167 MG/DL (ref 70–110)
GLUCOSE SERPL-MCNC: 53 MG/DL (ref 70–110)
GLUCOSE SERPL-MCNC: 65 MG/DL (ref 70–110)
GLUCOSE SERPL-MCNC: 81 MG/DL (ref 70–110)
GLUCOSE SERPL-MCNC: 86 MG/DL (ref 70–110)
GLUCOSE SERPL-MCNC: 96 MG/DL (ref 70–110)
GLUCOSE SERPL-MCNC: 99 MG/DL (ref 70–110)
HCG INTACT+B SERPL-ACNC: 1 MIU/ML
HCT VFR BLD AUTO: 24.5 % (ref 37–48.5)
HGB BLD-MCNC: 8.1 G/DL (ref 12–16)
IMM GRANULOCYTES # BLD AUTO: 0.07 K/UL (ref 0–0.04)
IMM GRANULOCYTES NFR BLD AUTO: 1.4 % (ref 0–0.5)
LACTATE SERPL-SCNC: 0.7 MMOL/L (ref 0.5–1.9)
LACTATE SERPL-SCNC: 0.9 MMOL/L (ref 0.5–1.9)
LYMPHOCYTES # BLD AUTO: 0.7 K/UL (ref 1–4.8)
LYMPHOCYTES NFR BLD: 13.3 % (ref 18–48)
MCH RBC QN AUTO: 29.6 PG (ref 27–31)
MCHC RBC AUTO-ENTMCNC: 33.1 G/DL (ref 32–36)
MCV RBC AUTO: 89 FL (ref 82–98)
MONOCYTES # BLD AUTO: 0.3 K/UL (ref 0.3–1)
MONOCYTES NFR BLD: 6.7 % (ref 4–15)
NEUTROPHILS # BLD AUTO: 3.8 K/UL (ref 1.8–7.7)
NEUTROPHILS NFR BLD: 76.8 % (ref 38–73)
NRBC BLD-RTO: 0 /100 WBC
PLATELET # BLD AUTO: 117 K/UL (ref 150–450)
PMV BLD AUTO: 10.2 FL (ref 9.2–12.9)
POTASSIUM SERPL-SCNC: 4.1 MMOL/L (ref 3.5–5.1)
PROT SERPL-MCNC: 6.9 G/DL (ref 6–8.4)
RBC # BLD AUTO: 2.74 M/UL (ref 4–5.4)
SODIUM SERPL-SCNC: 141 MMOL/L (ref 136–145)
WBC # BLD AUTO: 4.89 K/UL (ref 3.9–12.7)

## 2023-03-10 PROCEDURE — 93010 ELECTROCARDIOGRAM REPORT: CPT | Mod: ,,, | Performed by: INTERNAL MEDICINE

## 2023-03-10 PROCEDURE — 96367 TX/PROPH/DG ADDL SEQ IV INF: CPT

## 2023-03-10 PROCEDURE — 96365 THER/PROPH/DIAG IV INF INIT: CPT

## 2023-03-10 PROCEDURE — 82947 ASSAY GLUCOSE BLOOD QUANT: CPT | Performed by: INTERNAL MEDICINE

## 2023-03-10 PROCEDURE — G0378 HOSPITAL OBSERVATION PER HR: HCPCS

## 2023-03-10 PROCEDURE — 84702 CHORIONIC GONADOTROPIN TEST: CPT | Performed by: EMERGENCY MEDICINE

## 2023-03-10 PROCEDURE — 63600175 PHARM REV CODE 636 W HCPCS: Mod: TB,JG | Performed by: EMERGENCY MEDICINE

## 2023-03-10 PROCEDURE — 25000003 PHARM REV CODE 250: Performed by: INTERNAL MEDICINE

## 2023-03-10 PROCEDURE — 96361 HYDRATE IV INFUSION ADD-ON: CPT

## 2023-03-10 PROCEDURE — 93005 ELECTROCARDIOGRAM TRACING: CPT | Performed by: INTERNAL MEDICINE

## 2023-03-10 PROCEDURE — 25000003 PHARM REV CODE 250: Performed by: EMERGENCY MEDICINE

## 2023-03-10 PROCEDURE — 80053 COMPREHEN METABOLIC PANEL: CPT | Performed by: EMERGENCY MEDICINE

## 2023-03-10 PROCEDURE — 36415 COLL VENOUS BLD VENIPUNCTURE: CPT | Performed by: EMERGENCY MEDICINE

## 2023-03-10 PROCEDURE — 83605 ASSAY OF LACTIC ACID: CPT | Mod: 91 | Performed by: EMERGENCY MEDICINE

## 2023-03-10 PROCEDURE — 87040 BLOOD CULTURE FOR BACTERIA: CPT | Performed by: EMERGENCY MEDICINE

## 2023-03-10 PROCEDURE — 85025 COMPLETE CBC W/AUTO DIFF WBC: CPT | Performed by: EMERGENCY MEDICINE

## 2023-03-10 PROCEDURE — 93010 EKG 12-LEAD: ICD-10-PCS | Mod: ,,, | Performed by: INTERNAL MEDICINE

## 2023-03-10 PROCEDURE — 82962 GLUCOSE BLOOD TEST: CPT

## 2023-03-10 PROCEDURE — 99285 EMERGENCY DEPT VISIT HI MDM: CPT | Mod: 25

## 2023-03-10 RX ORDER — VANCOMYCIN HCL IN 5 % DEXTROSE 1G/250ML
1000 PLASTIC BAG, INJECTION (ML) INTRAVENOUS
Status: DISCONTINUED | OUTPATIENT
Start: 2023-03-10 | End: 2023-03-10

## 2023-03-10 RX ORDER — PANTOPRAZOLE SODIUM 40 MG/1
40 TABLET, DELAYED RELEASE ORAL DAILY
Status: DISCONTINUED | OUTPATIENT
Start: 2023-03-11 | End: 2023-03-13 | Stop reason: HOSPADM

## 2023-03-10 RX ORDER — HYDROMORPHONE HYDROCHLORIDE 1 MG/ML
0.2 INJECTION, SOLUTION INTRAMUSCULAR; INTRAVENOUS; SUBCUTANEOUS EVERY 6 HOURS PRN
Status: DISCONTINUED | OUTPATIENT
Start: 2023-03-10 | End: 2023-03-13

## 2023-03-10 RX ORDER — AMLODIPINE BESYLATE 5 MG/1
10 TABLET ORAL DAILY
Status: DISCONTINUED | OUTPATIENT
Start: 2023-03-11 | End: 2023-03-10

## 2023-03-10 RX ORDER — DEXTROSE MONOHYDRATE 50 MG/ML
INJECTION, SOLUTION INTRAVENOUS CONTINUOUS
Status: DISCONTINUED | OUTPATIENT
Start: 2023-03-10 | End: 2023-03-11

## 2023-03-10 RX ORDER — DEXTROSE MONOHYDRATE 100 MG/ML
INJECTION, SOLUTION INTRAVENOUS
Status: COMPLETED | OUTPATIENT
Start: 2023-03-10 | End: 2023-03-10

## 2023-03-10 RX ORDER — MEROPENEM AND SODIUM CHLORIDE 1 G/50ML
1 INJECTION, SOLUTION INTRAVENOUS ONCE
Status: COMPLETED | OUTPATIENT
Start: 2023-03-10 | End: 2023-03-10

## 2023-03-10 RX ORDER — LEVOFLOXACIN 5 MG/ML
250 INJECTION, SOLUTION INTRAVENOUS
Status: DISCONTINUED | OUTPATIENT
Start: 2023-03-11 | End: 2023-03-10

## 2023-03-10 RX ORDER — ACETAMINOPHEN 325 MG/1
650 TABLET ORAL EVERY 4 HOURS PRN
Status: DISCONTINUED | OUTPATIENT
Start: 2023-03-10 | End: 2023-03-13 | Stop reason: HOSPADM

## 2023-03-10 RX ORDER — DEXTROSE 50 % IN WATER (D50W) INTRAVENOUS SYRINGE
25
Status: DISCONTINUED | OUTPATIENT
Start: 2023-03-10 | End: 2023-03-13 | Stop reason: HOSPADM

## 2023-03-10 RX ORDER — DEXTROSE 50 % IN WATER (D50W) INTRAVENOUS SYRINGE
25
Status: COMPLETED | OUTPATIENT
Start: 2023-03-10 | End: 2023-03-10

## 2023-03-10 RX ORDER — LEVOFLOXACIN 5 MG/ML
250 INJECTION, SOLUTION INTRAVENOUS
Status: DISCONTINUED | OUTPATIENT
Start: 2023-03-11 | End: 2023-03-11

## 2023-03-10 RX ORDER — LEVETIRACETAM 500 MG/1
500 TABLET ORAL 2 TIMES DAILY
Status: DISCONTINUED | OUTPATIENT
Start: 2023-03-10 | End: 2023-03-13 | Stop reason: HOSPADM

## 2023-03-10 RX ORDER — VANCOMYCIN HCL IN 5 % DEXTROSE 1G/250ML
1000 PLASTIC BAG, INJECTION (ML) INTRAVENOUS
Status: COMPLETED | OUTPATIENT
Start: 2023-03-10 | End: 2023-03-10

## 2023-03-10 RX ADMIN — VANCOMYCIN HYDROCHLORIDE 1000 MG: 1 INJECTION, POWDER, LYOPHILIZED, FOR SOLUTION INTRAVENOUS at 08:03

## 2023-03-10 RX ADMIN — DEXTROSE MONOHYDRATE 25 G: 25 INJECTION, SOLUTION INTRAVENOUS at 07:03

## 2023-03-10 RX ADMIN — DEXTROSE MONOHYDRATE: 50 INJECTION, SOLUTION INTRAVENOUS at 09:03

## 2023-03-10 RX ADMIN — MEROPENEM AND SODIUM CHLORIDE 1 G: 1 INJECTION, SOLUTION INTRAVENOUS at 07:03

## 2023-03-10 RX ADMIN — DEXTROSE: 10 SOLUTION INTRAVENOUS at 06:03

## 2023-03-10 NOTE — ED PROVIDER NOTES
Encounter Date: 3/10/2023       History     Chief Complaint   Patient presents with    Hypoglycemia     Initial BG 34, GCS 8. Given Amp D50, , GCS 15. 3rd BG 76 EMS started  D10. BG upon arrival to ED 86.     Abdominal Pain     Chief complaint is decreased level of consciousness.  The patient was found on the floor by the mother.  Blood sugar there 34 blood sugar by EMS 34 she received an amp D50 has D10 running an hour sugar in the ER is 86 she is awake alert.  However her vital signs show a temperature is 87.8 rectal.  She denies any complaints except abdominal pain at the present time.      Review of patient's allergies indicates:   Allergen Reactions    Penicillins Hives     Past Medical History:   Diagnosis Date    CKD (chronic kidney disease), stage IV 2022    Diabetes mellitus due to underlying condition with unspecified complications 2022    Gastroparesis 2022    Heart failure with preserved ejection fraction 2022    EF 55% on 3/22    History of gastroesophageal reflux (GERD)     History of supraventricular tachycardia     Hyperkalemia 2022    Hypertensive emergency 2022    Sickle cell trait 2022    Type 2 diabetes mellitus      Past Surgical History:   Procedure Laterality Date     SECTION      x 3    COLONOSCOPY      COLONOSCOPY N/A 2022    Procedure: COLONOSCOPY;  Surgeon: Jagdeep Cedeno MD;  Location: [facility];  Service: Endoscopy;  Laterality: N/A;    ESOPHAGOGASTRODUODENOSCOPY N/A 10/18/2019    Procedure: ESOPHAGOGASTRODUODENOSCOPY (EGD);  Surgeon: Gianluca Mendez MD;  Location: [facility];  Service: Endoscopy;  Laterality: N/A;    ESOPHAGOGASTRODUODENOSCOPY N/A 2022    Procedure: EGD (ESOPHAGOGASTRODUODENOSCOPY);  Surgeon: Micky Paredes III, MD;  Location: [facility];  Service: Endoscopy;  Laterality: N/A;    ESOPHAGOGASTRODUODENOSCOPY N/A 2022    Procedure: EGD (ESOPHAGOGASTRODUODENOSCOPY);  Surgeon: Marcelo Zhong MD;  Location:  [facility];  Service: Endoscopy;  Laterality: N/A;    LAPAROSCOPIC CHOLECYSTECTOMY N/A 07/30/2022    Procedure: CHOLECYSTECTOMY, LAPAROSCOPIC;  Surgeon: Grey Perez MD;  Location: [facility];  Service: General;  Laterality: N/A;    PLACEMENT OF DUAL-LUMEN VASCULAR CATHETER Left 07/12/2022    Procedure: INSERTION-CATHETER-JOSEPH;  Surgeon: Dionte Gan MD;  Location: [facility] OR;  Service: General;  Laterality: Left;    PLACEMENT OF DUAL-LUMEN VASCULAR CATHETER Right 07/26/2022    Procedure: INSERTION-CATHETER-Hemosplit;  Surgeon: Dionte Gan MD;  Location: [facility] OR;  Service: General;  Laterality: Right;     Family History   Problem Relation Age of Onset    Diabetes Mother     Diabetes Father      Social History     Tobacco Use    Smoking status: Never    Smokeless tobacco: Never   Substance Use Topics    Alcohol use: Not Currently    Drug use: No     Review of Systems   Constitutional:  Negative for chills and fever.   HENT:  Negative for ear pain, rhinorrhea and sore throat.    Eyes:  Negative for pain and visual disturbance.   Respiratory:  Negative for cough and shortness of breath.    Cardiovascular:  Negative for chest pain and palpitations.   Gastrointestinal:  Positive for abdominal pain. Negative for constipation, diarrhea, nausea and vomiting.   Genitourinary:  Negative for dysuria, frequency, hematuria and urgency.   Musculoskeletal:  Negative for back pain, joint swelling and myalgias.   Skin:  Negative for rash.   Neurological:  Negative for dizziness, seizures, weakness and headaches.   Psychiatric/Behavioral:  Negative for dysphoric mood. The patient is not nervous/anxious.      Physical Exam     Initial Vitals [03/10/23 1738]   BP Pulse Resp Temp SpO2   126/74 (!) 54 16 (S) (!) 87.8 °F (31 °C) 100 %      MAP       --         Physical Exam    Nursing note and vitals reviewed.  Constitutional: She appears well-developed and well-nourished.   HENT:   Head: Normocephalic and atraumatic.   Eyes:  Conjunctivae, EOM and lids are normal. Pupils are equal, round, and reactive to light.   Neck: Trachea normal. Neck supple. No thyroid mass and no thyromegaly present.   Normal range of motion.  Cardiovascular:  Normal rate, regular rhythm and normal heart sounds.           Pulmonary/Chest: Effort normal and breath sounds normal.   Abdominal: Abdomen is soft. There is abdominal tenderness.   Diffuse tenderness noted.   Musculoskeletal:         General: Normal range of motion.      Cervical back: Normal range of motion and neck supple.     Neurological: She is alert and oriented to person, place, and time. She has normal strength and normal reflexes. No cranial nerve deficit or sensory deficit.   Skin: Skin is warm and dry.   Psychiatric: She has a normal mood and affect. Her speech is normal and behavior is normal. Judgment and thought content normal.       ED Course   Procedures  Labs Reviewed   CBC W/ AUTO DIFFERENTIAL - Abnormal; Notable for the following components:       Result Value    RBC 2.74 (*)     Hemoglobin 8.1 (*)     Hematocrit 24.5 (*)     RDW 17.9 (*)     Platelets 117 (*)     Immature Granulocytes 1.4 (*)     Immature Grans (Abs) 0.07 (*)     Lymph # 0.7 (*)     Gran % 76.8 (*)     Lymph % 13.3 (*)     All other components within normal limits   COMPREHENSIVE METABOLIC PANEL - Abnormal; Notable for the following components:    CO2 31 (*)     Glucose 65 (*)     BUN 31 (*)     Creatinine 3.7 (*)     Albumin 3.1 (*)     Total Bilirubin 1.6 (*)     Alkaline Phosphatase 499 (*)     AST 64 (*)     ALT 89 (*)     eGFR 15.8 (*)     All other components within normal limits   POCT GLUCOSE - Abnormal; Notable for the following components:    POC Glucose 53 (*)     All other components within normal limits   POCT GLUCOSE - Abnormal; Notable for the following components:    POC Glucose 167 (*)     All other components within normal limits   POCT GLUCOSE - Abnormal; Notable for the following components:    POC  Glucose 126 (*)     All other components within normal limits   CULTURE, BLOOD   CULTURE, BLOOD   LACTIC ACID, PLASMA   HCG, QUANTITATIVE   HCG, QUANTITATIVE   LACTIC ACID, PLASMA   GLUCOSE, RANDOM   POCT GLUCOSE   POCT GLUCOSE        ECG Results              EKG 12-lead (In process)  Result time 03/10/23 18:56:19      In process by Interface, Lab In [facility] (03/10/23 18:56:19)                   Narrative:    Test Reason : E16.2,    Vent. Rate : 056 BPM     Atrial Rate : 056 BPM     P-R Int : 188 ms          QRS Dur : 090 ms      QT Int : 548 ms       P-R-T Axes : 033 -05 075 degrees     QTc Int : 528 ms    Sinus bradycardia  Low voltage QRS  Prolonged QT  Abnormal ECG  When compared with ECG of 02-MAR-2023 09:31,  Vent. rate has decreased BY  33 BPM  Nonspecific T wave abnormality now evident in Lateral leads    Referred By: [email or web address]   SELF           Confirmed By:                                   Imaging Results              CT Abdomen Pelvis  Without Contrast (In process)                      CT Cervical Spine Without Contrast (In process)                      CT Head Without Contrast (In process)                      X-Ray Chest AP Portable (Final result)  Result time 03/10/23 19:45:59      Final result by Hoang Hudson MD (03/10/23 19:45:59)                   Narrative:      EXAM: XR CHEST AP PORTABLE    HISTORY: Sepsis    COMPARISON:Chest x-ray dated 3/3/2023    FINDINGS: A right IJ dialysis catheter is in place in satisfactory position. The lungs are well-expanded and are free of infiltrates. The heart is markedly enlarged and unchanged when differences in technique are accounted for. The pulmonary vasculature appears within normal limits. There are no pleural effusions.    IMPRESSION:   Marked cardiomegaly. No acute process is identified within the chest. No focal infiltrates are evident.    Electronically signed by:  Gianluca Hudson MD  3/10/2023 7:45 PM CST Workstation: [id number]                                      Medications   vancomycin - pharmacy to dose (has no administration in time range)   vancomycin in dextrose 5 % 1 gram/250 mL IVPB 1,000 mg (1,000 mg Intravenous New Bag 3/10/23 2022)   apixaban tablet 5 mg (has no administration in time range)   levETIRAcetam tablet 500 mg (has no administration in time range)   pantoprazole EC tablet 40 mg (has no administration in time range)   acetaminophen tablet 650 mg (has no administration in time range)   levoFLOXacin 250 mg/50 mL IVPB 250 mg (has no administration in time range)   dextrose 50% injection 25 g (has no administration in time range)   dextrose 5 % (D5W) infusion (has no administration in time range)   HYDROmorphone injection 0.2 mg (has no administration in time range)   dextrose 10 % infusion ( Intravenous New Bag 3/10/23 1838)   meropenem-0.9% sodium chloride 1 g/50 mL IVPB (0 g Intravenous Stopped 3/10/23 2018)   dextrose 50 % in water (D50W) injection 25 g (25 g Intravenous Given 3/10/23 1941)     Medical Decision Making:   ED Management:  I spoke to Dr. Garsia.  Case discussed patient is pending head CT C-spine CT and abdominal CT but will be admitted for hypothermia and hypoglycemia and renal failure          Attending Attestation:         Attending Critical Care:   Critical Care Times:   Direct Patient Care (initial evaluation, reassessments, and time considering the case)................................................................10 minutes.   Additional History from reviewing old medical records or taking additional history from the family, EMS, PCP, etc.......................5 minutes.   Ordering, Reviewing, and Interpreting Diagnostic Studies...............................................................................................................10 minutes.    Documentation..................................................................................................................................................................................5 minutes.   Consultation with other Physicians. .................................................................................................................................................5 minutes.   ==============================================================  Total Critical Care Time - exclusive of procedural time: 35 minutes.  ==============================================================  Critical care was necessary to treat or prevent imminent or life-threatening deterioration of the following conditions: renal failure.   The following critical care procedures were done by me (see procedure notes): pulse oximetry and blood draw for specimens.   Critical Care Condition: critical                      Clinical Impression:   Final diagnoses:  [E16.2] Low blood sugar  [T68.XXXA] Hypothermia        ED Disposition Condition    Observation                 Ruiz James MD  03/10/23 2040

## 2023-03-11 LAB
ALBUMIN SERPL BCP-MCNC: 2.6 G/DL (ref 3.5–5.2)
ALP SERPL-CCNC: 406 U/L (ref 55–135)
ALT SERPL W/O P-5'-P-CCNC: 72 U/L (ref 10–44)
ANION GAP SERPL CALC-SCNC: 13 MMOL/L (ref 8–16)
AST SERPL-CCNC: 46 U/L (ref 10–40)
BILIRUB SERPL-MCNC: 1.6 MG/DL (ref 0.1–1)
BUN SERPL-MCNC: 36 MG/DL (ref 6–20)
CALCIUM SERPL-MCNC: 8 MG/DL (ref 8.7–10.5)
CHLORIDE SERPL-SCNC: 93 MMOL/L (ref 95–110)
CO2 SERPL-SCNC: 26 MMOL/L (ref 23–29)
CREAT SERPL-MCNC: 4 MG/DL (ref 0.5–1.4)
ERYTHROCYTE [DISTWIDTH] IN BLOOD BY AUTOMATED COUNT: 17.8 % (ref 11.5–14.5)
EST. GFR  (NO RACE VARIABLE): 14.4 ML/MIN/1.73 M^2
GLUCOSE SERPL-MCNC: 104 MG/DL (ref 70–110)
GLUCOSE SERPL-MCNC: 122 MG/DL (ref 70–110)
GLUCOSE SERPL-MCNC: 285 MG/DL (ref 70–110)
GLUCOSE SERPL-MCNC: 464 MG/DL (ref 70–110)
HCT VFR BLD AUTO: 23.4 % (ref 37–48.5)
HGB BLD-MCNC: 7.9 G/DL (ref 12–16)
MCH RBC QN AUTO: 29.7 PG (ref 27–31)
MCHC RBC AUTO-ENTMCNC: 33.8 G/DL (ref 32–36)
MCV RBC AUTO: 88 FL (ref 82–98)
PLATELET # BLD AUTO: 141 K/UL (ref 150–450)
PMV BLD AUTO: 10.2 FL (ref 9.2–12.9)
POTASSIUM SERPL-SCNC: 4.4 MMOL/L (ref 3.5–5.1)
PROT SERPL-MCNC: 6 G/DL (ref 6–8.4)
RBC # BLD AUTO: 2.66 M/UL (ref 4–5.4)
SODIUM SERPL-SCNC: 132 MMOL/L (ref 136–145)
VANCOMYCIN SERPL-MCNC: 14.6 UG/ML
WBC # BLD AUTO: 4.08 K/UL (ref 3.9–12.7)

## 2023-03-11 PROCEDURE — 96375 TX/PRO/DX INJ NEW DRUG ADDON: CPT

## 2023-03-11 PROCEDURE — 80053 COMPREHEN METABOLIC PANEL: CPT | Performed by: INTERNAL MEDICINE

## 2023-03-11 PROCEDURE — 90935 HEMODIALYSIS ONE EVALUATION: CPT

## 2023-03-11 PROCEDURE — 25000003 PHARM REV CODE 250: Performed by: INTERNAL MEDICINE

## 2023-03-11 PROCEDURE — 80202 ASSAY OF VANCOMYCIN: CPT | Performed by: INTERNAL MEDICINE

## 2023-03-11 PROCEDURE — 36415 COLL VENOUS BLD VENIPUNCTURE: CPT | Performed by: INTERNAL MEDICINE

## 2023-03-11 PROCEDURE — 63600175 PHARM REV CODE 636 W HCPCS: Mod: JZ,JG | Performed by: INTERNAL MEDICINE

## 2023-03-11 PROCEDURE — 96367 TX/PROPH/DG ADDL SEQ IV INF: CPT | Mod: 59

## 2023-03-11 PROCEDURE — 96376 TX/PRO/DX INJ SAME DRUG ADON: CPT | Mod: 59

## 2023-03-11 PROCEDURE — 85027 COMPLETE CBC AUTOMATED: CPT | Performed by: INTERNAL MEDICINE

## 2023-03-11 PROCEDURE — G0378 HOSPITAL OBSERVATION PER HR: HCPCS

## 2023-03-11 PROCEDURE — 96366 THER/PROPH/DIAG IV INF ADDON: CPT | Mod: 59

## 2023-03-11 PROCEDURE — 96372 THER/PROPH/DIAG INJ SC/IM: CPT | Mod: 59 | Performed by: INTERNAL MEDICINE

## 2023-03-11 PROCEDURE — 63600175 PHARM REV CODE 636 W HCPCS: Performed by: INTERNAL MEDICINE

## 2023-03-11 RX ORDER — SODIUM CHLORIDE 9 MG/ML
INJECTION, SOLUTION INTRAVENOUS ONCE
Status: CANCELLED | OUTPATIENT
Start: 2023-03-11 | End: 2023-03-11

## 2023-03-11 RX ORDER — HYDRALAZINE HYDROCHLORIDE 25 MG/1
100 TABLET, FILM COATED ORAL EVERY 12 HOURS
Status: DISCONTINUED | OUTPATIENT
Start: 2023-03-11 | End: 2023-03-12

## 2023-03-11 RX ORDER — SODIUM CHLORIDE 9 MG/ML
INJECTION, SOLUTION INTRAVENOUS
Status: CANCELLED | OUTPATIENT
Start: 2023-03-11

## 2023-03-11 RX ORDER — AMLODIPINE BESYLATE 5 MG/1
10 TABLET ORAL DAILY
Status: DISCONTINUED | OUTPATIENT
Start: 2023-03-11 | End: 2023-03-13 | Stop reason: HOSPADM

## 2023-03-11 RX ORDER — MUPIROCIN 20 MG/G
OINTMENT TOPICAL 2 TIMES DAILY
Status: DISCONTINUED | OUTPATIENT
Start: 2023-03-11 | End: 2023-03-13 | Stop reason: HOSPADM

## 2023-03-11 RX ORDER — CARVEDILOL 3.12 MG/1
3.12 TABLET ORAL 2 TIMES DAILY
Status: DISCONTINUED | OUTPATIENT
Start: 2023-03-11 | End: 2023-03-12

## 2023-03-11 RX ORDER — HEPARIN SODIUM 1000 [USP'U]/ML
4000 INJECTION, SOLUTION INTRAVENOUS; SUBCUTANEOUS
Status: DISCONTINUED | OUTPATIENT
Start: 2023-03-11 | End: 2023-03-13 | Stop reason: HOSPADM

## 2023-03-11 RX ADMIN — CARVEDILOL 3.12 MG: 3.12 TABLET, FILM COATED ORAL at 08:03

## 2023-03-11 RX ADMIN — EPOETIN ALFA-EPBX 10000 UNITS: 10000 INJECTION, SOLUTION INTRAVENOUS; SUBCUTANEOUS at 01:03

## 2023-03-11 RX ADMIN — LEVETIRACETAM 500 MG: 500 TABLET, FILM COATED ORAL at 08:03

## 2023-03-11 RX ADMIN — LEVOFLOXACIN 250 MG: 5 INJECTION, SOLUTION INTRAVENOUS at 12:03

## 2023-03-11 RX ADMIN — HYDRALAZINE HYDROCHLORIDE 100 MG: 25 TABLET, FILM COATED ORAL at 08:03

## 2023-03-11 RX ADMIN — AMLODIPINE BESYLATE 10 MG: 5 TABLET ORAL at 03:03

## 2023-03-11 RX ADMIN — HYDROMORPHONE HYDROCHLORIDE 0.2 MG: 1 INJECTION, SOLUTION INTRAMUSCULAR; INTRAVENOUS; SUBCUTANEOUS at 03:03

## 2023-03-11 RX ADMIN — MUPIROCIN 1 G: 20 OINTMENT TOPICAL at 08:03

## 2023-03-11 RX ADMIN — HYDROMORPHONE HYDROCHLORIDE 0.2 MG: 1 INJECTION, SOLUTION INTRAMUSCULAR; INTRAVENOUS; SUBCUTANEOUS at 01:03

## 2023-03-11 RX ADMIN — HEPARIN SODIUM 4000 UNITS: 1000 INJECTION, SOLUTION INTRAVENOUS; SUBCUTANEOUS at 01:03

## 2023-03-11 RX ADMIN — HYDRALAZINE HYDROCHLORIDE 100 MG: 25 TABLET, FILM COATED ORAL at 03:03

## 2023-03-11 RX ADMIN — HYDROMORPHONE HYDROCHLORIDE 0.2 MG: 1 INJECTION, SOLUTION INTRAMUSCULAR; INTRAVENOUS; SUBCUTANEOUS at 09:03

## 2023-03-11 RX ADMIN — VANCOMYCIN HYDROCHLORIDE 500 MG: 500 INJECTION, POWDER, LYOPHILIZED, FOR SOLUTION INTRAVENOUS at 09:03

## 2023-03-11 RX ADMIN — PANTOPRAZOLE SODIUM 40 MG: 40 TABLET, DELAYED RELEASE ORAL at 08:03

## 2023-03-11 RX ADMIN — CARVEDILOL 3.12 MG: 3.12 TABLET, FILM COATED ORAL at 03:03

## 2023-03-11 RX ADMIN — APIXABAN 2.5 MG: 2.5 TABLET, FILM COATED ORAL at 08:03

## 2023-03-11 RX ADMIN — HYDROMORPHONE HYDROCHLORIDE 0.2 MG: 1 INJECTION, SOLUTION INTRAMUSCULAR; INTRAVENOUS; SUBCUTANEOUS at 08:03

## 2023-03-11 NOTE — SUBJECTIVE & OBJECTIVE
Past Medical History:   Diagnosis Date    CKD (chronic kidney disease), stage IV 2022    Diabetes mellitus due to underlying condition with unspecified complications 2022    Gastroparesis 2022    Heart failure with preserved ejection fraction 2022    EF 55% on 3/22    History of gastroesophageal reflux (GERD)     History of supraventricular tachycardia     Hyperkalemia 2022    Hypertensive emergency 2022    Sickle cell trait 2022    Type 2 diabetes mellitus        Past Surgical History:   Procedure Laterality Date     SECTION      x 3    COLONOSCOPY      COLONOSCOPY N/A 2022    Procedure: COLONOSCOPY;  Surgeon: Jagdeep Cedeno MD;  Location: [facility];  Service: Endoscopy;  Laterality: N/A;    ESOPHAGOGASTRODUODENOSCOPY N/A 10/18/2019    Procedure: ESOPHAGOGASTRODUODENOSCOPY (EGD);  Surgeon: Gianluca Mendez MD;  Location: [facility];  Service: Endoscopy;  Laterality: N/A;    ESOPHAGOGASTRODUODENOSCOPY N/A 2022    Procedure: EGD (ESOPHAGOGASTRODUODENOSCOPY);  Surgeon: Micky Paredes III, MD;  Location: [facility];  Service: Endoscopy;  Laterality: N/A;    ESOPHAGOGASTRODUODENOSCOPY N/A 2022    Procedure: EGD (ESOPHAGOGASTRODUODENOSCOPY);  Surgeon: Marcelo Zhong MD;  Location: [facility];  Service: Endoscopy;  Laterality: N/A;    LAPAROSCOPIC CHOLECYSTECTOMY N/A 2022    Procedure: CHOLECYSTECTOMY, LAPAROSCOPIC;  Surgeon: Grey Perez MD;  Location: [facility];  Service: General;  Laterality: N/A;    PLACEMENT OF DUAL-LUMEN VASCULAR CATHETER Left 2022    Procedure: INSERTION-CATHETER-JOSEPH;  Surgeon: Dionte Gan MD;  Location: [facility] OR;  Service: General;  Laterality: Left;    PLACEMENT OF DUAL-LUMEN VASCULAR CATHETER Right 2022    Procedure: INSERTION-CATHETER-Hemosplit;  Surgeon: Dionte Gan MD;  Location: [facility] OR;  Service: General;  Laterality: Right;       Review of patient's allergies indicates:   Allergen Reactions     Penicillins Hives       No current facility-administered medications on file prior to encounter.     Current Outpatient Medications on File Prior to Encounter   Medication Sig    albuterol (PROVENTIL/VENTOLIN HFA) 90 mcg/actuation inhaler Inhale 2 puffs into the lungs every 6 (six) hours as needed for Wheezing. Rescue    amLODIPine (NORVASC) 10 MG tablet Take 1 tablet (10 mg total) by mouth once daily.    amLODIPine (NORVASC) 10 MG tablet Take 1 tablet every day by oral route for 30 days. (Patient taking differently: Take 10 mg by mouth once daily.)    apixaban (ELIQUIS) 5 mg Tab Take 5 mg by mouth 2 (two) times daily.    apixaban (ELIQUIS) 5 mg Tab Take 1 tablet (5 mg total) by mouth 2 (two) times daily.    apixaban (ELIQUIS) 5 mg Tab Take 1 tablet twice a day by oral route for 30 days. (Patient taking differently: Take 5 mg by mouth 2 (two) times daily.)    BINAXNOW COVID-19 AG SELF TEST Kit Take 1 Dose by mouth once.    carvediloL (COREG) 25 MG tablet Take 1 tablet twice a day by oral route for 30 days. (Patient taking differently: Take 25 mg by mouth 2 (two) times daily.)    cloNIDine 0.2 mg/24 hr td ptwk (CATAPRES) 0.2 mg/24 hr Apply 1 patch to skin once every week. (Patient taking differently: Place 1 patch onto the skin once a week.)    dicyclomine (BENTYL) 10 MG capsule Take 1 capsule (10 mg total) by mouth 3 (three) times daily.    dicyclomine (BENTYL) 10 MG capsule Take 1 capsule 3 times a day by oral route for 30 days. (Patient taking differently: Take 10 mg by mouth 3 (three) times daily.)    ferrous sulfate 325 (65 FE) MG EC tablet Take 325 mg by mouth once daily.    FLUoxetine 20 MG capsule Take 1 capsule (20 mg total) by mouth once daily.    FLUoxetine 20 MG capsule Take 1 capsule every day by oral route for 30 days. (Patient taking differently: Take 20 mg by mouth once daily.)    furosemide (LASIX) 40 MG tablet Take 40 mg by mouth 2 (two) times a day.    gabapentin (NEURONTIN) 100 MG capsule Take 1  capsule (100 mg total) by mouth 2 (two) times daily.    gabapentin (NEURONTIN) 100 MG capsule Take 1 capsule by mouth 3 times daily (Patient taking differently: Take 100 mg by mouth 3 (three) times daily.)    hydrALAZINE (APRESOLINE) 100 MG tablet Take 1 tablet twice a day by oral route for 30 days. (Patient taking differently: Take 100 mg by mouth 2 (two) times daily.)    HYDROcodone-acetaminophen (NORCO)  mg per tablet Take 1 tablet by mouth every 4 (four) hours as needed for Pain.    HYDROmorphone (DILAUDID) 4 MG tablet Take 4 mg by mouth every 12 (twelve) hours as needed for Pain.    isosorbide mononitrate (IMDUR) 60 MG 24 hr tablet Take 2 tablets (120 mg total) by mouth once daily.    isosorbide mononitrate (IMDUR) 60 MG 24 hr tablet Take 1 tablet every day by oral route for 30 days. (Patient taking differently: Take 60 mg by mouth once daily.)    levETIRAcetam (KEPPRA) 500 MG Tab Take 1 tablet (500 mg total) by mouth 2 (two) times daily.    levETIRAcetam (KEPPRA) 500 MG Tab Take 1 tablet twice a day by oral route for 30 days. (Patient taking differently: Take 500 mg by mouth once daily.)    losartan-hydrochlorothiazide 100-12.5 mg (HYZAAR) 100-12.5 mg Tab Take 1 tablet by mouth once daily.    mirtazapine (REMERON SOL-TAB) 15 MG disintegrating tablet Take 1 tablet (15 mg total) by mouth nightly.    ondansetron (ZOFRAN) 4 MG tablet Take 1 tablet (4 mg total) by mouth every 8 (eight) hours as needed for Nausea.    ondansetron (ZOFRAN-ODT) 4 MG TbDL Place1 tablet on the tongue every 8 hours as needed (Patient taking differently: Take 4 mg by mouth every 6 (six) hours as needed.)    pantoprazole (PROTONIX) 40 MG tablet Take 40 mg by mouth once daily.    pantoprazole (PROTONIX) 40 MG tablet Take 1 tablet every day by oral route for 30 days. (Patient taking differently: Take 40 mg by mouth once daily.)    promethazine (PHENERGAN) 25 MG suppository Place 1 suppository (25 mg total) rectally every 6 (six) hours  "as needed for Nausea.    torsemide (DEMADEX) 100 MG Tab Take 100 mg by mouth once daily.    [DISCONTINUED] atenoloL (TENORMIN) 50 MG tablet Take 1 tablet (50 mg total) by mouth every other day.    [DISCONTINUED] insulin aspart U-100 (NOVOLOG FLEXPEN U-100 INSULIN) 100 unit/mL (3 mL) InPn pen Inject 10 units under the skin 3 times a day before meals. (Patient taking differently: Inject 10 Units into the skin 3 (three) times daily with meals.)    [DISCONTINUED] insulin aspart U-100 (NOVOLOG) 100 unit/mL (3 mL) InPn pen Inject 1-10 Units into the skin before meals and at bedtime as needed (Hyperglycemia). Max daily dose 40 units.    [DISCONTINUED] insulin detemir U-100 (LEVEMIR FLEXTOUCH U-100 INSULN) 100 unit/mL (3 mL) InPn pen Inject 18 units every day by subcutaneous route in the evening. (Patient taking differently: Inject 18 Units into the skin every evening.)    [DISCONTINUED] insulin glargine 100 units/mL SubQ pen Inject 16 Units into the skin every evening.    [DISCONTINUED] omeprazole (PRILOSEC) 20 MG capsule Take 2 capsules (40 mg total) by mouth once daily. for 10 days    [DISCONTINUED] pen needle, diabetic (BD ULTRA-FINE MAGALIE PEN NEEDLE) 32 gauge x 5/32" Ndle Inject into the skin 5 times per day with insulin     Family History       Problem Relation (Age of Onset)    Diabetes Mother, Father          Tobacco Use    Smoking status: Never    Smokeless tobacco: Never   Substance and Sexual Activity    Alcohol use: Not Currently    Drug use: No    Sexual activity: Not Currently     Partners: Male     Birth control/protection: I.U.D.     Review of Systems   Constitutional:  Positive for fatigue. Negative for activity change and appetite change.   HENT:  Negative for congestion and dental problem.    Eyes:  Negative for discharge and itching.   Respiratory:  Negative for shortness of breath.    Cardiovascular:  Negative for chest pain.   Gastrointestinal:  Negative for abdominal distention and abdominal pain. "   Endocrine: Negative for cold intolerance.   Genitourinary:  Negative for difficulty urinating and dysuria.   Musculoskeletal:  Negative for arthralgias and back pain.   Skin:  Negative for color change.   Neurological:  Negative for dizziness and facial asymmetry.   Hematological:  Negative for adenopathy.   Psychiatric/Behavioral:  Negative for agitation and behavioral problems.    Objective:     Vital Signs (Most Recent):  Temp: (!) 88.7 °F (31.5 °C) (bear hugger in place) (03/10/23 1843)  Pulse: (!) 56 (03/10/23 1843)  Resp: 13 (03/10/23 1843)  BP: 127/88 (03/10/23 1843)  SpO2: 96 % (03/10/23 1843)   Vital Signs (24h Range):  Temp:  [87.7 °F (30.9 °C)-88.7 °F (31.5 °C)] 88.7 °F (31.5 °C)  Pulse:  [54-56] 56  Resp:  [13-16] 13  SpO2:  [96 %-100 %] 96 %  BP: (126-127)/(74-88) 127/88     Weight: 59 kg (130 lb)  Body mass index is 23.78 kg/m².    Physical Exam  Vitals and nursing note reviewed.   Constitutional:       General: She is not in acute distress.  HENT:      Head: Atraumatic.      Right Ear: External ear normal.      Left Ear: External ear normal.      Nose: Nose normal.      Mouth/Throat:      Mouth: Mucous membranes are moist.   Cardiovascular:      Rate and Rhythm: Normal rate.   Pulmonary:      Effort: Pulmonary effort is normal.   Abdominal:      Palpations: Abdomen is soft.   Musculoskeletal:         General: Normal range of motion.      Cervical back: Normal range of motion.   Skin:     General: Skin is warm.   Neurological:      Mental Status: She is alert and oriented to person, place, and time.   Psychiatric:         Behavior: Behavior normal.           Significant Labs: All pertinent labs within the past 24 hours have been reviewed.  CBC:   Recent Labs   Lab 03/10/23  1755   WBC 4.89   HGB 8.1*   HCT 24.5*   *     CMP:   Recent Labs   Lab 03/10/23  1755      K 4.1      CO2 31*   GLU 65*   BUN 31*   CREATININE 3.7*   CALCIUM 9.0   PROT 6.9   ALBUMIN 3.1*   BILITOT 1.6*    ALKPHOS 499*   AST 64*   ALT 89*   ANIONGAP 10       Significant Imaging: I have reviewed all pertinent imaging results/findings within the past 24 hours.

## 2023-03-11 NOTE — PLAN OF CARE
Problem: Adult Inpatient Plan of Care  Goal: Plan of Care Review  Outcome: Ongoing, Progressing  Goal: Patient-Specific Goal (Individualized)  Outcome: Ongoing, Progressing  Goal: Absence of Hospital-Acquired Illness or Injury  Outcome: Ongoing, Progressing  Goal: Optimal Comfort and Wellbeing  Outcome: Ongoing, Progressing  Goal: Readiness for Transition of Care  Outcome: Ongoing, Progressing     Problem: Diabetes Comorbidity  Goal: Blood Glucose Level Within Targeted Range  Outcome: Ongoing, Progressing     Problem: Adjustment to Illness (Sepsis/Septic Shock)  Goal: Optimal Coping  Outcome: Ongoing, Progressing     Problem: Bleeding (Sepsis/Septic Shock)  Goal: Absence of Bleeding  Outcome: Ongoing, Progressing     Problem: Glycemic Control Impaired (Sepsis/Septic Shock)  Goal: Blood Glucose Level Within Desired Range  Outcome: Ongoing, Progressing     Problem: Infection Progression (Sepsis/Septic Shock)  Goal: Absence of Infection Signs and Symptoms  Outcome: Ongoing, Progressing     Problem: Nutrition Impaired (Sepsis/Septic Shock)  Goal: Optimal Nutrition Intake  Outcome: Ongoing, Progressing     Problem: Infection  Goal: Absence of Infection Signs and Symptoms  Outcome: Ongoing, Progressing     Problem: Device-Related Complication Risk (Hemodialysis)  Goal: Safe, Effective Therapy Delivery  Outcome: Ongoing, Progressing     Problem: Hemodynamic Instability (Hemodialysis)  Goal: Effective Tissue Perfusion  Outcome: Ongoing, Progressing     Problem: Infection (Hemodialysis)  Goal: Absence of Infection Signs and Symptoms  Outcome: Ongoing, Progressing

## 2023-03-11 NOTE — PROGRESS NOTES
03/11/23 1455   Handoff Report   Received From Teodoro Thomas RN   Given To Olamide Swanson RN   Vital Signs   Temp 98.8 °F (37.1 °C)   Temp Source Oral   Pulse 84   Resp 20   SpO2 97 %   BP (!) 184/109   MAP (mmHg) 84   Assessments (Pre/Post)   Level of Consciousness (AVPU) alert        Hemodialysis Catheter 12/09/22 right subclavian   Placement Date: 12/09/22   Location: right subclavian   Site Assessment No redness;No drainage;No swelling;No warmth   Line Securement Device Secured with sutures   Dressing Type Biopatch in place;Central line dressing   Dressing Status Clean;Dry;Intact   Dressing Intervention First dressing   Date on Dressing 03/11/23   Dressing Due to be Changed 03/18/23   Post-Hemodialysis Assessment   Rinseback Volume (mL) 250 mL   Blood Volume Processed (Liters) 59.1 L   Dialyzer Clearance Lightly streaked   Duration of Treatment 180 minutes   Additional Fluid Intake (mL) 500 mL   Total UF (mL) 3500 mL   Net Fluid Removal 3000   Patient Response to Treatment tolerated well   Post-Treatment Weight 54.2 kg (119 lb 7.8 oz)   Treatment Weight Change -3   Post-Hemodialysis Comments bp remains elevated, report given to RN

## 2023-03-11 NOTE — PLAN OF CARE
[facility]  Initial Discharge Assessment       Primary Care Provider: Primary Doctor No    Admission Diagnosis: Hypothermia [T68.XXXA]    Admission Date: 3/10/2023  Expected Discharge Date:          Payor: MEDICAID / Plan: [place]UE [place] / Product Type: Managed Medicaid /     Extended Emergency Contact Information  Primary Emergency Contact: Garcia Howard  Mobile Phone: [phone]  Relation: Father  Preferred language: English   needed? No  Secondary Emergency Contact: LeeTanya  Mobile Phone: [phone]  Relation: Step parent  Preferred language: English   needed? No    Discharge Plan A: Home  Discharge Plan B: Home      [facility] [place], [place] - [address]  [address] [address]  [facility] [address]  Phone: [phone] Fax: [phone]    [id number] [address] AT [facility]  [address]  [facility] [address]  Phone: [phone] Fax: [phone]    [place], [place] - [address]  [address]  [facility] [address]  Phone: [phone] Fax: [phone]    Chart review conducted to obtained information for assessment. The pt did not wish to participate in assessment at this time .     Initial Assessment (most recent)       Adult Discharge Assessment - 03/11/23 0949          Discharge Assessment    Assessment Type Discharge Planning Assessment     Confirmed/corrected address, phone number and insurance Yes     Confirmed Demographics Correct on Facesheet     Source of Information health record     Reason For Admission Hypothermia     People in Home parent(s)     Facility Arrived From: home     Do you expect to return to your current living situation? Yes     Do you have help at home or someone to help you manage your care at home? Yes     Who are your caregiver(s) and their phone number(s)? Garcia Howard (Father)   [phone] (Mobile),Tanya Howard  Step parent   [phone]     Prior to hospitilization cognitive status: Alert/Oriented     Current cognitive status: Alert/Oriented     Walking or Climbing Stairs ambulation difficulty, requires equipment     Mobility Management Rolling Walker     Equipment Currently Used at Home walker, rolling     Readmission within 30 days? Yes     Patient currently being followed by outpatient case management? No     Do you currently have service(s) that help you manage your care at home? No     Do you take prescription medications? Yes     Do you have prescription coverage? Yes     Coverage Medicaid Healthy Blue     Do you have any problems affording any of your prescribed medications? No     Is the patient taking medications as prescribed? yes     Who is going to help you get home at discharge? Garcia Howard (Father)   [phone] (Mobile),LeeTanya Step parent   [phone]     How do you get to doctors appointments? family or friend will provide     Are you on dialysis? Yes     Dialysis Name and Scheduled days [facility], Days T, TH, and S     Do you take coumadin? No     Discharge Plan A Home     Discharge Plan B Home     DME Needed Upon Discharge  none        Physical Activity    On average, how many days per week do you engage in moderate to strenuous exercise (like a brisk walk)? Patient refused     On average, how many minutes do you engage in exercise at this level? Patient refused        Financial Resource Strain    How hard is it for you to pay for the very basics like food, housing, medical care, and heating? Patient refused        Housing Stability    In the last 12 months, was there a time when you were not able to pay the mortgage or rent on time? Patient refused     In the last 12 months, was there a time when you did not have a steady place to sleep or slept in a shelter (including now)? Patient refused        Transportation Needs    In the past 12 months, has lack of transportation kept you from  medical appointments or from getting medications? Patient refused     In the past 12 months, has lack of transportation kept you from meetings, work, or from getting things needed for daily living? Patient refused        Food Insecurity    Within the past 12 months, you worried that your food would run out before you got the money to buy more. Patient refused     Within the past 12 months, the food you bought just didn't last and you didn't have money to get more. Patient refused        Stress    Do you feel stress - tense, restless, nervous, or anxious, or unable to sleep at night because your mind is troubled all the time - these days? Patient refused        Social Connections    In a typical week, how many times do you talk on the phone with family, friends, or neighbors? Patient refused     How often do you get together with friends or relatives? Patient refused     How often do you attend Episcopal or Hoahaoism services? Patient refused     Do you belong to any clubs or organizations such as Episcopal groups, unions, fraternal or athletic groups, or school groups? Patient refused     How often do you attend meetings of the clubs or organizations you belong to? Patient refused     Are you , , , , never , or living with a partner? Patient refused        Alcohol Use    Q1: How often do you have a drink containing alcohol? Patient refused     Q2: How many drinks containing alcohol do you have on a typical day when you are drinking? Patient refused     Q3: How often do you have six or more drinks on one occasion? Patient refused

## 2023-03-11 NOTE — HPI
33 year old patient again getting admitted with Hypoglycemia and associated Hypothermia  Her Mother found her unresponsive in bed and activated EMS  When EMS arrived pt was unresponsive and was given dextrose for hypoglycemia  Condition came back to baseline and was escorted to ER  In ER pt was put on bear hugger for persistent hypothermia and was given iv abx  Pt is a frequent flier with only 10 ER visits and 6 hospital admissions this year(2023)  Since October 2022 she she already had 17  CT scans of abdomen /Pelvis/ Chest  She has drug seeking behavior and always her abdominal pain mysteriously disappear with iv dilaudid  Also has H/o hospital shopping

## 2023-03-11 NOTE — ASSESSMENT & PLAN NOTE
Ongoing chronic issue   Pt is a frequent flier with only 10 ER visits and 6 hospital admissions this year(2023)

## 2023-03-11 NOTE — ED NOTES
Notified Dr. Garsia of pt SpO2 decreasing to 85-89% on room air. VO given by Dr. Garsia to place pt on 1-4 L NC PRN and place order for O2 PRN for pt to maintain SpO2 at >92%. See orders.

## 2023-03-11 NOTE — PROGRESS NOTES
Subjective:   patient seen and examined while on dialysis.  She appears to be eating now.  Sugars have been in the mid to 200s.  No questions or complaints.    Physical Exam  Vitals and nursing note reviewed.   Constitutional:       General: She is not in acute distress.  HENT:      Head: Atraumatic.      Right Ear: External ear normal.      Left Ear: External ear normal.      Nose: Nose normal.      Mouth/Throat:      Mouth: Mucous membranes are moist.   Cardiovascular:      Rate and Rhythm: Normal rate.   Pulmonary:      Effort: Pulmonary effort is normal.   Abdominal:      Palpations: Abdomen is soft.   Musculoskeletal:         General: Normal range of motion.      Cervical back: Normal range of motion.   Skin:     General: Skin is warm.   Neurological:      Mental Status: She is alert and oriented to person, place, and time.   Psychiatric:         Behavior: Behavior normal.    Hypothermia  Likely from hypoglycemia.  Temperature now within normal limits.  She is currently eating and sugars are in 250.   last TSH 1 month ago was within normal limits s        Hypoglycemia  Patient now eating.  Okay to DC D5 drip.        Frequent hospital admissions  Ongoing chronic issue   Pt is a frequent flier with only 10 ER visits and 6 hospital admissions this year(2023)           Intractable generalized abdominal pain  History of drug seeking behavior and always her abdominal pain mysteriously disappear with iv dilaudid.  Has had multiple imaging studies showed no new findings.           Pericardial effusion  This is a chronic issue with no signs of Tamponade        Drug-seeking behavior  Ongoing chronic issue      Deep vein thrombosis (DVT) of non-extremity vein  Maintain PO eliquis        ESRD (end stage renal disease) on dialysis  HD as per Nephro MD  Consulted Nephro MD        Seizure disorder  Maintain iv keppra

## 2023-03-11 NOTE — CONSULTS
INPATIENT NEPHROLOGY CONSULT   [facility] NEPHROLOGY    Tabby Howard  03/11/2023    Reason for consultation:    esrd    Chief Complaint:   Chief Complaint   Patient presents with    Hypoglycemia     Initial BG 34, GCS 8. Given Amp D50, , GCS 15. 3rd BG 76 EMS started  D10. BG upon arrival to ED 86.     Abdominal Pain          History of Present Illness:    Per H and P    33 year old patient again getting admitted with Hypoglycemia and associated Hypothermia  Her Mother found her unresponsive in bed and activated EMS  When EMS arrived pt was unresponsive and was given dextrose for hypoglycemia  Condition came back to baseline and was escorted to ER  In ER pt was put on bear hugger for persistent hypothermia and was given iv abx  Pt is a frequent flier with only 10 ER visits and 6 hospital admissions this year(2023)  Since October 2022 she she already had 17  CT scans of abdomen /Pelvis/ Chest  She has drug seeking behavior and always her abdominal pain mysteriously disappear with iv dilaudid    3/11 Patient seen on hemodialysis for uremic clearance and ultrafiltration.        Plan of Care:       Assessment:    esrd  --continue dialysis per routine  --fluid restrict  --renal dose medication per routine  --continue outpt medication  --continue binders with meals    Anemia  --erythropoiesis stimulating agent with renal replacement therapy    Hyperphosphatemia  --renal diet  --continue binders    Hypertension  --uf with hd  --fluid restrict  --low salt diet  --continue home medication    Hyponatremia  --fluid restrict  --UF with hd           Thank you for allowing us to participate in this patient's care. We will continue to follow.    Vital Signs:  Temp Readings from Last 3 Encounters:   03/11/23 99.2 °F (37.3 °C) (Oral)   03/06/23 98 °F (36.7 °C) (Oral)   02/08/23 97.4 °F (36.3 °C) (Tympanic)       Pulse Readings from Last 3 Encounters:   03/11/23 86   03/06/23 78   02/08/23 98       BP Readings from Last 3  Encounters:   23 (!) 160/103   23 133/89   23 114/76       Weight:  Wt Readings from Last 3 Encounters:   23 57.2 kg (126 lb 1.7 oz)   23 58 kg (127 lb 13.9 oz)   23 59.9 kg (132 lb)       Past Medical & Surgical History:  Past Medical History:   Diagnosis Date    CKD (chronic kidney disease), stage IV 2022    Diabetes mellitus due to underlying condition with unspecified complications 2022    Gastroparesis 2022    Heart failure with preserved ejection fraction 2022    EF 55% on 3/22    History of gastroesophageal reflux (GERD)     History of supraventricular tachycardia     Hyperkalemia 2022    Hypertensive emergency 2022    Sickle cell trait 2022    Type 2 diabetes mellitus        Past Surgical History:   Procedure Laterality Date     SECTION      x 3    COLONOSCOPY      COLONOSCOPY N/A 2022    Procedure: COLONOSCOPY;  Surgeon: Jagdeep Cedeno MD;  Location: [facility];  Service: Endoscopy;  Laterality: N/A;    ESOPHAGOGASTRODUODENOSCOPY N/A 10/18/2019    Procedure: ESOPHAGOGASTRODUODENOSCOPY (EGD);  Surgeon: Gianluca Mendez MD;  Location: [facility];  Service: Endoscopy;  Laterality: N/A;    ESOPHAGOGASTRODUODENOSCOPY N/A 2022    Procedure: EGD (ESOPHAGOGASTRODUODENOSCOPY);  Surgeon: Micky Paredes III, MD;  Location: [facility];  Service: Endoscopy;  Laterality: N/A;    ESOPHAGOGASTRODUODENOSCOPY N/A 2022    Procedure: EGD (ESOPHAGOGASTRODUODENOSCOPY);  Surgeon: Marcelo Zhong MD;  Location: [facility] ENDO;  Service: Endoscopy;  Laterality: N/A;    LAPAROSCOPIC CHOLECYSTECTOMY N/A 2022    Procedure: CHOLECYSTECTOMY, LAPAROSCOPIC;  Surgeon: Grey Perez MD;  Location: [facility];  Service: General;  Laterality: N/A;    PLACEMENT OF DUAL-LUMEN VASCULAR CATHETER Left 2022    Procedure: INSERTION-CATHETER-JOSEPH;  Surgeon: Dionte Gan MD;  Location: [facility];  Service: General;  Laterality: Left;     PLACEMENT OF DUAL-LUMEN VASCULAR CATHETER Right 07/26/2022    Procedure: INSERTION-CATHETER-Hemosplit;  Surgeon: Dionte Gan MD;  Location: [facility];  Service: General;  Laterality: Right;       Past Social History:  Social History     Socioeconomic History    Marital status:    Tobacco Use    Smoking status: Never    Smokeless tobacco: Never   Substance and Sexual Activity    Alcohol use: Not Currently    Drug use: No    Sexual activity: Not Currently     Partners: Male     Birth control/protection: I.U.D.     Social Determinants of Health     Financial Resource Strain: Unknown    Difficulty of Paying Living Expenses: Patient refused   Food Insecurity: Unknown    Worried About Running Out of Food in the Last Year: Patient refused    Ran Out of Food in the Last Year: Patient refused   Transportation Needs: Unknown    Lack of Transportation (Medical): Patient refused    Lack of Transportation (Non-Medical): Patient refused   Physical Activity: Unknown    Days of Exercise per Week: Patient refused    Minutes of Exercise per Session: Patient refused   Stress: Unknown    Feeling of Stress : Patient refused   Social Connections: Unknown    Frequency of Communication with Friends and Family: Patient refused    Frequency of Social Gatherings with Friends and Family: Patient refused    Attends Sikh Services: Patient refused    Active Member of Clubs or Organizations: Patient refused    Attends Club or Organization Meetings: Patient refused    Marital Status: Patient refused   Housing Stability: Unknown    Unable to Pay for Housing in the Last Year: Patient refused    Unstable Housing in the Last Year: Patient refused       Medications:  No current facility-administered medications on file prior to encounter.     Current Outpatient Medications on File Prior to Encounter   Medication Sig Dispense Refill    albuterol (PROVENTIL/VENTOLIN HFA) 90 mcg/actuation inhaler Inhale 2 puffs into the lungs every 6 (six)  hours as needed for Wheezing. Rescue 18 g 3    amLODIPine (NORVASC) 10 MG tablet Take 1 tablet (10 mg total) by mouth once daily. 30 tablet 11    amLODIPine (NORVASC) 10 MG tablet Take 1 tablet every day by oral route for 30 days. (Patient taking differently: Take 10 mg by mouth once daily.) 30 tablet 2    apixaban (ELIQUIS) 5 mg Tab Take 5 mg by mouth 2 (two) times daily.      apixaban (ELIQUIS) 5 mg Tab Take 1 tablet (5 mg total) by mouth 2 (two) times daily. 60 tablet 2    apixaban (ELIQUIS) 5 mg Tab Take 1 tablet twice a day by oral route for 30 days. (Patient taking differently: Take 5 mg by mouth 2 (two) times daily.) 60 tablet 2    BINAXNOW COVID-19 AG SELF TEST Kit Take 1 Dose by mouth once.      carvediloL (COREG) 25 MG tablet Take 1 tablet twice a day by oral route for 30 days. (Patient taking differently: Take 25 mg by mouth 2 (two) times daily.) 60 tablet 2    cloNIDine 0.2 mg/24 hr td ptwk (CATAPRES) 0.2 mg/24 hr Apply 1 patch to skin once every week. (Patient taking differently: Place 1 patch onto the skin once a week.) 4 patch 2    [] dicyclomine (BENTYL) 10 MG capsule Take 1 capsule (10 mg total) by mouth 3 (three) times daily. 90 capsule 0    dicyclomine (BENTYL) 10 MG capsule Take 1 capsule 3 times a day by oral route for 30 days. (Patient taking differently: Take 10 mg by mouth 3 (three) times daily.) 90 capsule 2    ferrous sulfate 325 (65 FE) MG EC tablet Take 325 mg by mouth once daily.      FLUoxetine 20 MG capsule Take 1 capsule (20 mg total) by mouth once daily. 30 capsule 11    FLUoxetine 20 MG capsule Take 1 capsule every day by oral route for 30 days. (Patient taking differently: Take 20 mg by mouth once daily.) 30 capsule 2    furosemide (LASIX) 40 MG tablet Take 40 mg by mouth 2 (two) times a day.      gabapentin (NEURONTIN) 100 MG capsule Take 1 capsule (100 mg total) by mouth 2 (two) times daily. 90 capsule 2    gabapentin (NEURONTIN) 100 MG capsule Take 1 capsule by mouth 3  times daily (Patient taking differently: Take 100 mg by mouth 3 (three) times daily.) 90 capsule 2    hydrALAZINE (APRESOLINE) 100 MG tablet Take 1 tablet twice a day by oral route for 30 days. (Patient taking differently: Take 100 mg by mouth 2 (two) times daily.) 60 tablet 2    HYDROcodone-acetaminophen (NORCO)  mg per tablet Take 1 tablet by mouth every 4 (four) hours as needed for Pain.      HYDROmorphone (DILAUDID) 4 MG tablet Take 4 mg by mouth every 12 (twelve) hours as needed for Pain.      isosorbide mononitrate (IMDUR) 60 MG 24 hr tablet Take 2 tablets (120 mg total) by mouth once daily. 30 tablet 11    isosorbide mononitrate (IMDUR) 60 MG 24 hr tablet Take 1 tablet every day by oral route for 30 days. (Patient taking differently: Take 60 mg by mouth once daily.) 30 tablet 2    levETIRAcetam (KEPPRA) 500 MG Tab Take 1 tablet (500 mg total) by mouth 2 (two) times daily. 60 tablet 11    levETIRAcetam (KEPPRA) 500 MG Tab Take 1 tablet twice a day by oral route for 30 days. (Patient taking differently: Take 500 mg by mouth once daily.) 60 tablet 2    losartan-hydrochlorothiazide 100-12.5 mg (HYZAAR) 100-12.5 mg Tab Take 1 tablet by mouth once daily.      mirtazapine (REMERON SOL-TAB) 15 MG disintegrating tablet Take 1 tablet (15 mg total) by mouth nightly. 90 tablet 0    ondansetron (ZOFRAN) 4 MG tablet Take 1 tablet (4 mg total) by mouth every 8 (eight) hours as needed for Nausea. 60 tablet 2    ondansetron (ZOFRAN-ODT) 4 MG TbDL Place1 tablet on the tongue every 8 hours as needed (Patient taking differently: Take 4 mg by mouth every 6 (six) hours as needed.) 24 tablet 0    pantoprazole (PROTONIX) 40 MG tablet Take 40 mg by mouth once daily.      pantoprazole (PROTONIX) 40 MG tablet Take 1 tablet every day by oral route for 30 days. (Patient taking differently: Take 40 mg by mouth once daily.) 30 tablet 2    promethazine (PHENERGAN) 25 MG suppository Place 1 suppository (25 mg total) rectally every 6  "(six) hours as needed for Nausea. 10 suppository 0    torsemide (DEMADEX) 100 MG Tab Take 100 mg by mouth once daily.      [DISCONTINUED] atenoloL (TENORMIN) 50 MG tablet Take 1 tablet (50 mg total) by mouth every other day. 45 tablet 3    [DISCONTINUED] omeprazole (PRILOSEC) 20 MG capsule Take 2 capsules (40 mg total) by mouth once daily. for 10 days 20 capsule 0     Scheduled Meds:   apixaban  2.5 mg Oral BID    epoetin teresa-epbx  10,000 Units Subcutaneous Every Tues, Thurs, Sat    levETIRAcetam  500 mg Oral BID    levoFLOXacin  250 mg Intravenous Q48H    mupirocin   Nasal BID    pantoprazole  40 mg Oral Daily     Continuous Infusions:   dextrose 5 % (D5W) 100 mL/hr at 03/10/23 2154     PRN Meds:.acetaminophen, dextrose 50 % in water (D50W), dextrose 50%, HYDROmorphone, Pharmacy to dose Vancomycin consult **AND** vancomycin - pharmacy to dose    Allergies:  Penicillins    Past Family History:  Reviewed; refer to Hospitalist Admission Note    Review of Systems:  Review of Systems - All 14 systems reviewed and negative, except as noted in HPI    Physical Exam:    BP (!) 160/103 (BP Location: Right arm, Patient Position: Lying)   Pulse 86   Temp 99.2 °F (37.3 °C) (Oral)   Resp 20   Ht 5' 2" (1.575 m)   Wt 57.2 kg (126 lb 1.7 oz)   LMP  (LMP Unknown)   SpO2 95%   Breastfeeding No   BMI 23.06 kg/m²     General Appearance:    No distress   Head:    Normocephalic, without obvious abnormality, atraumatic   Eyes:    PER, conjunctiva/corneas clear, EOM's intact in both eyes        Throat:   Lips, mucosa, and tongue normal; teeth and gums normal   Back:     Symmetric, no curvature, ROM normal, no CVA tenderness   Lungs:     Clear to auscultation bilaterally, respirations unlabored   Chest wall:    No tenderness or deformity   Heart:    Regular rate and rhythm, S1 and S2 normal, no murmur, rub   or gallop   Abdomen:     Soft, non-tender, bowel sounds active all four quadrants,     no masses, no organomegaly "   Extremities:   Extremities normal, atraumatic, no cyanosis or edema   Pulses:   2+ and symmetric all extremities   MSK:   No joint or muscle swelling, tenderness or deformity   Skin:   Skin color, texture, turgor normal, no rashes or lesions   Neurologic:     No flap     Results:  Lab Results   Component Value Date     (L) 03/11/2023    K 4.4 03/11/2023    CL 93 (L) 03/11/2023    CO2 26 03/11/2023    BUN 36 (H) 03/11/2023    CREATININE 4.0 (H) 03/11/2023    CALCIUM 8.0 (L) 03/11/2023    ANIONGAP 13 03/11/2023    ESTGFRAFRICA 20 (A) 07/31/2022    EGFRNONAA 18 (A) 07/31/2022       Lab Results   Component Value Date    CALCIUM 8.0 (L) 03/11/2023    PHOS 3.8 02/08/2023       Recent Labs   Lab 03/11/23  0501   WBC 4.08   RBC 2.66*   HGB 7.9*   HCT 23.4*   *   MCV 88   MCH 29.7   MCHC 33.8          I have personally reviewed pertinent radiological imaging and reports.    Patient care was time spent personally by me on the following activities:   Obtaining a history  Examination of patient.  Providing medical care at the patients bedside.  Developing a treatment plan with patient or surrogate and bedside caregivers  Ordering and reviewing laboratory studies, radiographic studies, pulse oximetry.  Ordering and performing treatments and interventions.  Evaluation of patient's response to treatment.  Discussions with consultants while on the unit and immediately available to the patient.  Re-evaluation of the patient's condition.  Documentation in the medical record.     Hugh Figueroa MD  Nephrology  [facility]  [phone]

## 2023-03-11 NOTE — PROGRESS NOTES
VANCOMYCIN PHARMACOKINETIC NOTE:  Vancomycin Day # 1    Objective/Assessment:    Diagnosis/Indication for Vancomycin:Sepsis      33 y.o., female; Actual Body Weight = 59 kg (130 lb).    The patient has the following labs:  3/11/2023 Estimated Creatinine Clearance: 16.9 mL/min (A) (based on SCr of 3.7 mg/dL (H)). Lab Results   Component Value Date    BUN 31 (H) 03/10/2023     Lab Results   Component Value Date    WBC 4.89 03/10/2023          Plan:  Adjust vancomycin dose and/or frequency based on the patient's actual weight and renal function:  Initiate Vancomycin 1000 mg IV once with subsequent dose following random level. Orders have been entered into patient's chart.        Vancomycin random level has been ordered for 19:00 on 3/11.    Pharmacy will manage vancomycin therapy, monitor serum vancomycin levels, monitor renal function and adjust regimen as necessary.    Thank you for allowing us to participate in this patient's care.     Neli Lanza 3/11/2023   Department of Pharmacy  Ext [phone]

## 2023-03-11 NOTE — ASSESSMENT & PLAN NOTE
Recent TSH checked was normal  At the moment continue iv abx for suspected sepsis   Reason  for her Hypothermia is Hypoglycemia  Hypothermia is thought to be caused by low blood glucose in the brain (neuroglucopenia)   This  may serve as a protective mechanism for decreasing energy demand during glucose deprivation  I discontinued all her insulin regime   Her Last HbA1C when checked was 5.8

## 2023-03-11 NOTE — ASSESSMENT & PLAN NOTE
She has drug seeking behavior and always her abdominal pain mysteriously disappear with iv dilaudid

## 2023-03-11 NOTE — H&P
[facility] - Emergency Dept  Hospital Medicine  History & Physical    Patient Name: Tabby Howard  MRN: [id number]  Patient Class: OP- Observation  Admission Date: 3/10/2023  Attending Physician: Alfonso Garsia MD   Primary Care Provider: Primary Doctor No         Patient information was obtained from patient, past medical records and ER records.     Subjective:     Principal Problem:Hypothermia    Chief Complaint:   Chief Complaint   Patient presents with    Hypoglycemia     Initial BG 34, GCS 8. Given Amp D50, , GCS 15. 3rd BG 76 EMS started  D10. BG upon arrival to ED 86.     Abdominal Pain        HPI: 33 year old patient again getting admitted with Hypoglycemia and associated Hypothermia  Her Mother found her unresponsive in bed and activated EMS  When EMS arrived pt was unresponsive and was given dextrose for hypoglycemia  Condition came back to baseline and was escorted to ER  In ER pt was put on bear hugger for persistent hypothermia and was given iv abx  Pt is a frequent flier with only 10 ER visits and 6 hospital admissions this year()  Since 2022 she she already had 17  CT scans of abdomen /Pelvis/ Chest  She has drug seeking behavior and always her abdominal pain mysteriously disappear with iv dilaudid  Also has H/o hospital shopping         Past Medical History:   Diagnosis Date    CKD (chronic kidney disease), stage IV 2022    Diabetes mellitus due to underlying condition with unspecified complications 2022    Gastroparesis 2022    Heart failure with preserved ejection fraction 2022    EF 55% on 3/22    History of gastroesophageal reflux (GERD)     History of supraventricular tachycardia     Hyperkalemia 2022    Hypertensive emergency 2022    Sickle cell trait 2022    Type 2 diabetes mellitus        Past Surgical History:   Procedure Laterality Date     SECTION      x 3    COLONOSCOPY      COLONOSCOPY N/A 2022     Procedure: COLONOSCOPY;  Surgeon: Jagdeep Cedeno MD;  Location: [facility] ENDO;  Service: Endoscopy;  Laterality: N/A;    ESOPHAGOGASTRODUODENOSCOPY N/A 10/18/2019    Procedure: ESOPHAGOGASTRODUODENOSCOPY (EGD);  Surgeon: Gianluca Mendez MD;  Location: [facility] ENDO;  Service: Endoscopy;  Laterality: N/A;    ESOPHAGOGASTRODUODENOSCOPY N/A 08/24/2022    Procedure: EGD (ESOPHAGOGASTRODUODENOSCOPY);  Surgeon: Micky Paredes III, MD;  Location: [facility] ENDO;  Service: Endoscopy;  Laterality: N/A;    ESOPHAGOGASTRODUODENOSCOPY N/A 12/5/2022    Procedure: EGD (ESOPHAGOGASTRODUODENOSCOPY);  Surgeon: Marcelo Zhong MD;  Location[facility] ENDO;  Service: Endoscopy;  Laterality: N/A;    LAPAROSCOPIC CHOLECYSTECTOMY N/A 07/30/2022    Procedure: CHOLECYSTECTOMY, LAPAROSCOPIC;  Surgeon: Grey Perez MD;  Location: Health system OR;  Service: General;  Laterality: N/A;    PLACEMENT OF DUAL-LUMEN VASCULAR CATHETER Left 07/12/2022    Procedure: INSERTION-CATHETER-JOSEPH;  Surgeon: Dionte Gan MD;  Location: Health system OR;  Service: General;  Laterality: Left;    PLACEMENT OF DUAL-LUMEN VASCULAR CATHETER Right 07/26/2022    Procedure: INSERTION-CATHETER-Hemosplit;  Surgeon: Dionte Gan MD;  Location: Health system OR;  Service: General;  Laterality: Right;       Review of patient's allergies indicates:   Allergen Reactions    Penicillins Hives       No current facility-administered medications on file prior to encounter.     Current Outpatient Medications on File Prior to Encounter   Medication Sig    albuterol (PROVENTIL/VENTOLIN HFA) 90 mcg/actuation inhaler Inhale 2 puffs into the lungs every 6 (six) hours as needed for Wheezing. Rescue    amLODIPine (NORVASC) 10 MG tablet Take 1 tablet (10 mg total) by mouth once daily.    amLODIPine (NORVASC) 10 MG tablet Take 1 tablet every day by oral route for 30 days. (Patient taking differently: Take 10 mg by mouth once daily.)    apixaban (ELIQUIS) 5 mg Tab Take 5 mg by mouth 2 (two) times  daily.    apixaban (ELIQUIS) 5 mg Tab Take 1 tablet (5 mg total) by mouth 2 (two) times daily.    apixaban (ELIQUIS) 5 mg Tab Take 1 tablet twice a day by oral route for 30 days. (Patient taking differently: Take 5 mg by mouth 2 (two) times daily.)    BINAXNOW COVID-19 AG SELF TEST Kit Take 1 Dose by mouth once.    carvediloL (COREG) 25 MG tablet Take 1 tablet twice a day by oral route for 30 days. (Patient taking differently: Take 25 mg by mouth 2 (two) times daily.)    cloNIDine 0.2 mg/24 hr td ptwk (CATAPRES) 0.2 mg/24 hr Apply 1 patch to skin once every week. (Patient taking differently: Place 1 patch onto the skin once a week.)    dicyclomine (BENTYL) 10 MG capsule Take 1 capsule (10 mg total) by mouth 3 (three) times daily.    dicyclomine (BENTYL) 10 MG capsule Take 1 capsule 3 times a day by oral route for 30 days. (Patient taking differently: Take 10 mg by mouth 3 (three) times daily.)    ferrous sulfate 325 (65 FE) MG EC tablet Take 325 mg by mouth once daily.    FLUoxetine 20 MG capsule Take 1 capsule (20 mg total) by mouth once daily.    FLUoxetine 20 MG capsule Take 1 capsule every day by oral route for 30 days. (Patient taking differently: Take 20 mg by mouth once daily.)    furosemide (LASIX) 40 MG tablet Take 40 mg by mouth 2 (two) times a day.    gabapentin (NEURONTIN) 100 MG capsule Take 1 capsule (100 mg total) by mouth 2 (two) times daily.    gabapentin (NEURONTIN) 100 MG capsule Take 1 capsule by mouth 3 times daily (Patient taking differently: Take 100 mg by mouth 3 (three) times daily.)    hydrALAZINE (APRESOLINE) 100 MG tablet Take 1 tablet twice a day by oral route for 30 days. (Patient taking differently: Take 100 mg by mouth 2 (two) times daily.)    HYDROcodone-acetaminophen (NORCO)  mg per tablet Take 1 tablet by mouth every 4 (four) hours as needed for Pain.    HYDROmorphone (DILAUDID) 4 MG tablet Take 4 mg by mouth every 12 (twelve) hours as needed for Pain.     isosorbide mononitrate (IMDUR) 60 MG 24 hr tablet Take 2 tablets (120 mg total) by mouth once daily.    isosorbide mononitrate (IMDUR) 60 MG 24 hr tablet Take 1 tablet every day by oral route for 30 days. (Patient taking differently: Take 60 mg by mouth once daily.)    levETIRAcetam (KEPPRA) 500 MG Tab Take 1 tablet (500 mg total) by mouth 2 (two) times daily.    levETIRAcetam (KEPPRA) 500 MG Tab Take 1 tablet twice a day by oral route for 30 days. (Patient taking differently: Take 500 mg by mouth once daily.)    losartan-hydrochlorothiazide 100-12.5 mg (HYZAAR) 100-12.5 mg Tab Take 1 tablet by mouth once daily.    mirtazapine (REMERON SOL-TAB) 15 MG disintegrating tablet Take 1 tablet (15 mg total) by mouth nightly.    ondansetron (ZOFRAN) 4 MG tablet Take 1 tablet (4 mg total) by mouth every 8 (eight) hours as needed for Nausea.    ondansetron (ZOFRAN-ODT) 4 MG TbDL Place1 tablet on the tongue every 8 hours as needed (Patient taking differently: Take 4 mg by mouth every 6 (six) hours as needed.)    pantoprazole (PROTONIX) 40 MG tablet Take 40 mg by mouth once daily.    pantoprazole (PROTONIX) 40 MG tablet Take 1 tablet every day by oral route for 30 days. (Patient taking differently: Take 40 mg by mouth once daily.)    promethazine (PHENERGAN) 25 MG suppository Place 1 suppository (25 mg total) rectally every 6 (six) hours as needed for Nausea.    torsemide (DEMADEX) 100 MG Tab Take 100 mg by mouth once daily.    [DISCONTINUED] atenoloL (TENORMIN) 50 MG tablet Take 1 tablet (50 mg total) by mouth every other day.    [DISCONTINUED] insulin aspart U-100 (NOVOLOG FLEXPEN U-100 INSULIN) 100 unit/mL (3 mL) InPn pen Inject 10 units under the skin 3 times a day before meals. (Patient taking differently: Inject 10 Units into the skin 3 (three) times daily with meals.)    [DISCONTINUED] insulin aspart U-100 (NOVOLOG) 100 unit/mL (3 mL) InPn pen Inject 1-10 Units into the skin before meals and at bedtime as  "needed (Hyperglycemia). Max daily dose 40 units.    [DISCONTINUED] insulin detemir U-100 (LEVEMIR FLEXTOUCH U-100 INSULN) 100 unit/mL (3 mL) InPn pen Inject 18 units every day by subcutaneous route in the evening. (Patient taking differently: Inject 18 Units into the skin every evening.)    [DISCONTINUED] insulin glargine 100 units/mL SubQ pen Inject 16 Units into the skin every evening.    [DISCONTINUED] omeprazole (PRILOSEC) 20 MG capsule Take 2 capsules (40 mg total) by mouth once daily. for 10 days    [DISCONTINUED] pen needle, diabetic (BD ULTRA-FINE MAGALIE PEN NEEDLE) 32 gauge x 5/32" Ndle Inject into the skin 5 times per day with insulin     Family History       Problem Relation (Age of Onset)    Diabetes Mother, Father          Tobacco Use    Smoking status: Never    Smokeless tobacco: Never   Substance and Sexual Activity    Alcohol use: Not Currently    Drug use: No    Sexual activity: Not Currently     Partners: Male     Birth control/protection: I.U.D.     Review of Systems   Constitutional:  Positive for fatigue. Negative for activity change and appetite change.   HENT:  Negative for congestion and dental problem.    Eyes:  Negative for discharge and itching.   Respiratory:  Negative for shortness of breath.    Cardiovascular:  Negative for chest pain.   Gastrointestinal:  Negative for abdominal distention and abdominal pain.   Endocrine: Negative for cold intolerance.   Genitourinary:  Negative for difficulty urinating and dysuria.   Musculoskeletal:  Negative for arthralgias and back pain.   Skin:  Negative for color change.   Neurological:  Negative for dizziness and facial asymmetry.   Hematological:  Negative for adenopathy.   Psychiatric/Behavioral:  Negative for agitation and behavioral problems.    Objective:     Vital Signs (Most Recent):  Temp: (!) 88.7 °F (31.5 °C) (bear hugger in place) (03/10/23 1843)  Pulse: (!) 56 (03/10/23 1843)  Resp: 13 (03/10/23 1843)  BP: 127/88 (03/10/23 " 1843)  SpO2: 96 % (03/10/23 1843)   Vital Signs (24h Range):  Temp:  [87.7 °F (30.9 °C)-88.7 °F (31.5 °C)] 88.7 °F (31.5 °C)  Pulse:  [54-56] 56  Resp:  [13-16] 13  SpO2:  [96 %-100 %] 96 %  BP: (126-127)/(74-88) 127/88     Weight: 59 kg (130 lb)  Body mass index is 23.78 kg/m².    Physical Exam  Vitals and nursing note reviewed.   Constitutional:       General: She is not in acute distress.  HENT:      Head: Atraumatic.      Right Ear: External ear normal.      Left Ear: External ear normal.      Nose: Nose normal.      Mouth/Throat:      Mouth: Mucous membranes are moist.   Cardiovascular:      Rate and Rhythm: Normal rate.   Pulmonary:      Effort: Pulmonary effort is normal.   Abdominal:      Palpations: Abdomen is soft.   Musculoskeletal:         General: Normal range of motion.      Cervical back: Normal range of motion.   Skin:     General: Skin is warm.   Neurological:      Mental Status: She is alert and oriented to person, place, and time.   Psychiatric:         Behavior: Behavior normal.           Significant Labs: All pertinent labs within the past 24 hours have been reviewed.  CBC:   Recent Labs   Lab 03/10/23  1755   WBC 4.89   HGB 8.1*   HCT 24.5*   *     CMP:   Recent Labs   Lab 03/10/23  1755      K 4.1      CO2 31*   GLU 65*   BUN 31*   CREATININE 3.7*   CALCIUM 9.0   PROT 6.9   ALBUMIN 3.1*   BILITOT 1.6*   ALKPHOS 499*   AST 64*   ALT 89*   ANIONGAP 10       Significant Imaging: I have reviewed all pertinent imaging results/findings within the past 24 hours.    Assessment/Plan:     * Hypothermia  Recent TSH checked was normal  At the moment continue iv abx for suspected sepsis   Reason  for her Hypothermia is Hypoglycemia  Hypothermia is thought to be caused by low blood glucose in the brain (neuroglucopenia)   This  may serve as a protective mechanism for decreasing energy demand during glucose deprivation  I discontinued all her insulin regime   Her Last HbA1C when checked  was 5.8      Hypoglycemia  Maintain dextrose 5% infusion  PRN basis glucose pushes       Frequent hospital admissions  Ongoing chronic issue   Pt is a frequent flier with only 10 ER visits and 6 hospital admissions this year(2023)        Intractable generalized abdominal pain  She has drug seeking behavior and always her abdominal pain mysteriously disappear with iv dilaudid        Pericardial effusion  This is a chronic issue with no signs of Tamponade      Drug-seeking behavior  Ongoing chronic issue     Deep vein thrombosis (DVT) of non-extremity vein  Maintain PO eliquis      ESRD (end stage renal disease) on dialysis  HD as per Nephro MD  Consulted Nephro MD      Seizure disorder  Maintain iv keppra        VTE Risk Mitigation (From admission, onward)         Ordered     apixaban tablet 5 mg  2 times daily         03/10/23 1947     IP VTE HIGH RISK PATIENT  Once         03/10/23 1947     Place sequential compression device  Until discontinued         03/10/23 1947                   Alfonso Garsia MD  Department of Hospital Medicine   [facility] [place] - Emergency Dept

## 2023-03-11 NOTE — ED NOTES
Notified Dr. Garsia by messenger of pt BG levels over the last hour to see if he wanted to make changes to pt tx. See Orders to change pt from D10 to D5 and VO for PRN D50 push.

## 2023-03-12 LAB
ALBUMIN SERPL BCP-MCNC: 3.2 G/DL (ref 3.5–5.2)
ALP SERPL-CCNC: 446 U/L (ref 55–135)
ALT SERPL W/O P-5'-P-CCNC: 75 U/L (ref 10–44)
ANION GAP SERPL CALC-SCNC: 12 MMOL/L (ref 8–16)
AST SERPL-CCNC: 41 U/L (ref 10–40)
BILIRUB SERPL-MCNC: 1.5 MG/DL (ref 0.1–1)
BUN SERPL-MCNC: 21 MG/DL (ref 6–20)
CALCIUM SERPL-MCNC: 8.8 MG/DL (ref 8.7–10.5)
CHLORIDE SERPL-SCNC: 100 MMOL/L (ref 95–110)
CO2 SERPL-SCNC: 21 MMOL/L (ref 23–29)
CREAT SERPL-MCNC: 3.2 MG/DL (ref 0.5–1.4)
ERYTHROCYTE [DISTWIDTH] IN BLOOD BY AUTOMATED COUNT: 18 % (ref 11.5–14.5)
EST. GFR  (NO RACE VARIABLE): 18.8 ML/MIN/1.73 M^2
GLUCOSE SERPL-MCNC: 155 MG/DL (ref 70–110)
GLUCOSE SERPL-MCNC: 202 MG/DL (ref 70–110)
GLUCOSE SERPL-MCNC: 219 MG/DL (ref 70–110)
GLUCOSE SERPL-MCNC: 332 MG/DL (ref 70–110)
GLUCOSE SERPL-MCNC: 440 MG/DL (ref 70–110)
GLUCOSE SERPL-MCNC: 442 MG/DL (ref 70–110)
HCT VFR BLD AUTO: 25.7 % (ref 37–48.5)
HGB BLD-MCNC: 8.2 G/DL (ref 12–16)
MCH RBC QN AUTO: 29.3 PG (ref 27–31)
MCHC RBC AUTO-ENTMCNC: 31.9 G/DL (ref 32–36)
MCV RBC AUTO: 92 FL (ref 82–98)
PLATELET # BLD AUTO: 123 K/UL (ref 150–450)
PMV BLD AUTO: 10.1 FL (ref 9.2–12.9)
POTASSIUM SERPL-SCNC: 4.6 MMOL/L (ref 3.5–5.1)
PROT SERPL-MCNC: 6.9 G/DL (ref 6–8.4)
RBC # BLD AUTO: 2.8 M/UL (ref 4–5.4)
SODIUM SERPL-SCNC: 133 MMOL/L (ref 136–145)
WBC # BLD AUTO: 5.03 K/UL (ref 3.9–12.7)

## 2023-03-12 PROCEDURE — 36415 COLL VENOUS BLD VENIPUNCTURE: CPT | Performed by: INTERNAL MEDICINE

## 2023-03-12 PROCEDURE — 96375 TX/PRO/DX INJ NEW DRUG ADDON: CPT

## 2023-03-12 PROCEDURE — 80053 COMPREHEN METABOLIC PANEL: CPT | Performed by: INTERNAL MEDICINE

## 2023-03-12 PROCEDURE — 85027 COMPLETE CBC AUTOMATED: CPT | Performed by: INTERNAL MEDICINE

## 2023-03-12 PROCEDURE — 25000003 PHARM REV CODE 250: Performed by: INTERNAL MEDICINE

## 2023-03-12 PROCEDURE — 63600175 PHARM REV CODE 636 W HCPCS: Performed by: INTERNAL MEDICINE

## 2023-03-12 PROCEDURE — G0378 HOSPITAL OBSERVATION PER HR: HCPCS

## 2023-03-12 PROCEDURE — 96376 TX/PRO/DX INJ SAME DRUG ADON: CPT | Mod: 59

## 2023-03-12 PROCEDURE — 63600175 PHARM REV CODE 636 W HCPCS: Performed by: STUDENT IN AN ORGANIZED HEALTH CARE EDUCATION/TRAINING PROGRAM

## 2023-03-12 PROCEDURE — 96372 THER/PROPH/DIAG INJ SC/IM: CPT | Performed by: STUDENT IN AN ORGANIZED HEALTH CARE EDUCATION/TRAINING PROGRAM

## 2023-03-12 RX ORDER — INSULIN ASPART 100 [IU]/ML
10 INJECTION, SOLUTION INTRAVENOUS; SUBCUTANEOUS ONCE
Status: COMPLETED | OUTPATIENT
Start: 2023-03-12 | End: 2023-03-12

## 2023-03-12 RX ORDER — HYDRALAZINE HYDROCHLORIDE 25 MG/1
100 TABLET, FILM COATED ORAL EVERY 8 HOURS
Status: DISCONTINUED | OUTPATIENT
Start: 2023-03-12 | End: 2023-03-13 | Stop reason: HOSPADM

## 2023-03-12 RX ORDER — ONDANSETRON 2 MG/ML
4 INJECTION INTRAMUSCULAR; INTRAVENOUS EVERY 6 HOURS PRN
Status: DISCONTINUED | OUTPATIENT
Start: 2023-03-12 | End: 2023-03-13 | Stop reason: HOSPADM

## 2023-03-12 RX ORDER — CARVEDILOL 6.25 MG/1
6.25 TABLET ORAL 2 TIMES DAILY
Status: DISCONTINUED | OUTPATIENT
Start: 2023-03-12 | End: 2023-03-13

## 2023-03-12 RX ADMIN — HYDROMORPHONE HYDROCHLORIDE 0.2 MG: 1 INJECTION, SOLUTION INTRAMUSCULAR; INTRAVENOUS; SUBCUTANEOUS at 06:03

## 2023-03-12 RX ADMIN — ACETAMINOPHEN 650 MG: 325 TABLET ORAL at 09:03

## 2023-03-12 RX ADMIN — ONDANSETRON 4 MG: 2 INJECTION INTRAMUSCULAR; INTRAVENOUS at 07:03

## 2023-03-12 RX ADMIN — LEVETIRACETAM 500 MG: 500 TABLET, FILM COATED ORAL at 08:03

## 2023-03-12 RX ADMIN — INSULIN ASPART 10 UNITS: 100 INJECTION, SOLUTION INTRAVENOUS; SUBCUTANEOUS at 06:03

## 2023-03-12 RX ADMIN — MUPIROCIN 1 G: 20 OINTMENT TOPICAL at 09:03

## 2023-03-12 RX ADMIN — APIXABAN 2.5 MG: 2.5 TABLET, FILM COATED ORAL at 08:03

## 2023-03-12 RX ADMIN — HYDROMORPHONE HYDROCHLORIDE 0.2 MG: 1 INJECTION, SOLUTION INTRAMUSCULAR; INTRAVENOUS; SUBCUTANEOUS at 11:03

## 2023-03-12 RX ADMIN — CARVEDILOL 3.12 MG: 3.12 TABLET, FILM COATED ORAL at 08:03

## 2023-03-12 RX ADMIN — LEVETIRACETAM 500 MG: 500 TABLET, FILM COATED ORAL at 09:03

## 2023-03-12 RX ADMIN — HYDROMORPHONE HYDROCHLORIDE 0.2 MG: 1 INJECTION, SOLUTION INTRAMUSCULAR; INTRAVENOUS; SUBCUTANEOUS at 05:03

## 2023-03-12 RX ADMIN — HYDRALAZINE HYDROCHLORIDE 100 MG: 25 TABLET, FILM COATED ORAL at 08:03

## 2023-03-12 RX ADMIN — APIXABAN 2.5 MG: 2.5 TABLET, FILM COATED ORAL at 09:03

## 2023-03-12 RX ADMIN — CARVEDILOL 6.25 MG: 6.25 TABLET, FILM COATED ORAL at 09:03

## 2023-03-12 RX ADMIN — AMLODIPINE BESYLATE 10 MG: 5 TABLET ORAL at 08:03

## 2023-03-12 RX ADMIN — HYDRALAZINE HYDROCHLORIDE 100 MG: 25 TABLET, FILM COATED ORAL at 09:03

## 2023-03-12 RX ADMIN — PANTOPRAZOLE SODIUM 40 MG: 40 TABLET, DELAYED RELEASE ORAL at 08:03

## 2023-03-12 NOTE — PROGRESS NOTES
Pharmacokinetic Assessment Follow Up: IV Vancomycin    Patient brief summary:  Tabby Howard is a 33 y.o. female initiated on antimicrobial therapy with IV Vancomycin for treatment of Sepsis    The patient's current regimen is intermittent dosing based on random level and dialysis schedule.      Actual Body Weight = 57.2 kg (126 lb 1.7 oz).    Renal Function:   Estimated Creatinine Clearance: 15.7 mL/min (A) (based on SCr of 4 mg/dL (H)).      Vancomycin serum concentration assessment(s):    The random level was drawn correctly and can be used to guide therapy at this time. The measurement is within the desired definitive target range of 10 to 15 mcg/mL.    Vancomycin Regimen Plan:    Re-dose Vancomycin 500 mg once with random next level to be drawn at AM draw on 3/14.    Drug levels (last 3 results):  Recent Labs   Lab Result Units 03/11/23  1915   Vancomycin, Random ug/mL 14.6          Dialysis Method (if applicable):  intermittent HD; (T,TH,Sat)      Pharmacy will continue to follow and monitor vancomycin.    Please contact pharmacy at extension [phone] for questions regarding this assessment.    Thank you for the consult,   Neli Lanza

## 2023-03-12 NOTE — PROGRESS NOTES
Subjective:   patient seen and examined today.  Complaining of generalized abdominal pain.  Blood pressure also elevated.    Physical Exam  Vitals and nursing note reviewed.   Constitutional:       General: She is not in acute distress.  HENT:      Head: Atraumatic.      Right Ear: External ear normal.      Left Ear: External ear normal.      Nose: Nose normal.      Mouth/Throat:      Mouth: Mucous membranes are moist.   Cardiovascular:      Rate and Rhythm: Normal rate.   Pulmonary:      Effort: Pulmonary effort is normal.   Abdominal:      Palpations: Abdomen is soft.  Nontender to superficial or deep palpation.  Musculoskeletal:         General: Normal range of motion.      Cervical back: Normal range of motion.   Skin:     General: Skin is warm.   Neurological:      Mental Status: She is alert and oriented to person, place, and time.   Psychiatric:         Behavior: Behavior normal.    Hypothermia  Generalized abdominal pain  Likely from hypoglycemia.  Temperature now within normal limits.  Eating yesterday but has not eaten as of this morning cause of generalized abdominal pain.  Her abdomen is benign on examination.  No findings on imaging.      Refractory hypertension  -on multiple BP medications at home which have been restarted.  Will increase her carvedilol as her pressure is still not ideal today.       Hypoglycemia  -D5 drip DC yesterday as patient was eating.  We will continue to monitor and continues to not eat will need to put back on the drip.   -A1c was 5.8 so we will need to DC her insulin regimen on discharge.            Frequent hospital admissions  Ongoing chronic issue   Pt is a frequent flier with only 10 ER visits and 6 hospital admissions this year(2023)           Intractable generalized abdominal pain  History of drug seeking behavior and always her abdominal pain mysteriously disappear with iv dilaudid.  Has had multiple imaging studies showed no new findings.           Pericardial  effusion  This is a chronic issue with no signs of Tamponade        Drug-seeking behavior  Ongoing chronic issue      Deep vein thrombosis (DVT) of non-extremity vein  Maintain PO eliquis        ESRD (end stage renal disease) on dialysis  HD as per Nephro MD  Consulted Nephro MD        Seizure disorder  Maintain iv keppra

## 2023-03-12 NOTE — PLAN OF CARE
Problem: Adult Inpatient Plan of Care  Goal: Plan of Care Review  Outcome: Ongoing, Progressing  Goal: Readiness for Transition of Care  Outcome: Ongoing, Progressing     Problem: Diabetes Comorbidity  Goal: Blood Glucose Level Within Targeted Range  Outcome: Ongoing, Progressing     Problem: Infection  Goal: Absence of Infection Signs and Symptoms  Outcome: Ongoing, Progressing

## 2023-03-12 NOTE — PLAN OF CARE
Problem: Adult Inpatient Plan of Care  Goal: Plan of Care Review  Outcome: Ongoing, Progressing  Goal: Optimal Comfort and Wellbeing  Outcome: Ongoing, Progressing     POC reviewed with pt.  Call light within reach.  Bed in lowest position.  Bed alarm intact.  No acute distress noted.

## 2023-03-12 NOTE — NURSING
"0917 MD notified of BP.  Will monitor.  Pt refused telemetry monitor after several attempts.  MD notified.  Pt c/o abdomen pain state no relief with medication.  No acute distress noted. Will continue to monitor pt. Call light within reach.  Bed in lowest position.  Bed alarm intact.     1200 MD at bedside.  MD aware of abdomen pain. Reviewed glucose and BPs.  Pt encouraged to eat.  Pt state unable to eat at this time. Pt offered alternate and fluids, pt state not right now.  Will continue to monitor pt..  No acute distress noted.  Pt medicated for pain.  Pt anxious and crying.  Relaxation techniques reviewed with pt.  Pt state "not right now nurse."  Will continue to monitor pt.    "

## 2023-03-12 NOTE — CONSULTS
INPATIENT NEPHROLOGY CONSULT   [facility] NEPHROLOGY    Tabby Howard  03/12/2023    Reason for consultation:    esrd    Chief Complaint:   Chief Complaint   Patient presents with    Hypoglycemia     Initial BG 34, GCS 8. Given Amp D50, , GCS 15. 3rd BG 76 EMS started  D10. BG upon arrival to ED 86.     Abdominal Pain          History of Present Illness:    Per H and P    33 year old patient again getting admitted with Hypoglycemia and associated Hypothermia  Her Mother found her unresponsive in bed and activated EMS  When EMS arrived pt was unresponsive and was given dextrose for hypoglycemia  Condition came back to baseline and was escorted to ER  In ER pt was put on bear hugger for persistent hypothermia and was given iv abx  Pt is a frequent flier with only 10 ER visits and 6 hospital admissions this year(2023)  Since October 2022 she she already had 17  CT scans of abdomen /Pelvis/ Chest  She has drug seeking behavior and always her abdominal pain mysteriously disappear with iv dilaudid    3/11 Patient seen on hemodialysis for uremic clearance and ultrafiltration.    3/12  hypertensive.  Tolerated hd yesterday.        Plan of Care:       Assessment:    esrd  --continue dialysis per routine  --fluid restrict  --renal dose medication per routine  --continue outpt medication  --continue binders with meals    Anemia  --erythropoiesis stimulating agent with renal replacement therapy    Hyperphosphatemia  --renal diet  --continue binders    Hypertension  --uf with hd  --fluid restrict  --low salt diet  --continue home medication  --hydralazine to 100mg tid    Hyponatremia  --fluid restrict  --UF with hd           Thank you for allowing us to participate in this patient's care. We will continue to follow.    Vital Signs:  Temp Readings from Last 3 Encounters:   03/12/23 97.7 °F (36.5 °C) (Oral)   03/06/23 98 °F (36.7 °C) (Oral)   02/08/23 97.4 °F (36.3 °C) (Tympanic)       Pulse Readings from Last 3 Encounters:    23 95   23 78   23 98       BP Readings from Last 3 Encounters:   23 (!) 194/107   23 133/89   23 114/76       Weight:  Wt Readings from Last 3 Encounters:   23 57.2 kg (126 lb 1.7 oz)   23 58 kg (127 lb 13.9 oz)   23 59.9 kg (132 lb)       Past Medical & Surgical History:  Past Medical History:   Diagnosis Date    CKD (chronic kidney disease), stage IV 2022    Diabetes mellitus due to underlying condition with unspecified complications 2022    Gastroparesis 2022    Heart failure with preserved ejection fraction 2022    EF 55% on 3/22    History of gastroesophageal reflux (GERD)     History of supraventricular tachycardia     Hyperkalemia 2022    Hypertensive emergency 2022    Sickle cell trait 2022    Type 2 diabetes mellitus        Past Surgical History:   Procedure Laterality Date     SECTION      x 3    COLONOSCOPY      COLONOSCOPY N/A 2022    Procedure: COLONOSCOPY;  Surgeon: Jagdeep Cedeno MD;  Location: [facility];  Service: Endoscopy;  Laterality: N/A;    ESOPHAGOGASTRODUODENOSCOPY N/A 10/18/2019    Procedure: ESOPHAGOGASTRODUODENOSCOPY (EGD);  Surgeon: Gianluca Mendez MD;  Location: [facility];  Service: Endoscopy;  Laterality: N/A;    ESOPHAGOGASTRODUODENOSCOPY N/A 2022    Procedure: EGD (ESOPHAGOGASTRODUODENOSCOPY);  Surgeon: Micky Paredes III, MD;  Location: [facility];  Service: Endoscopy;  Laterality: N/A;    ESOPHAGOGASTRODUODENOSCOPY N/A 2022    Procedure: EGD (ESOPHAGOGASTRODUODENOSCOPY);  Surgeon: Marcelo Zhong MD;  Location: [facility];  Service: Endoscopy;  Laterality: N/A;    LAPAROSCOPIC CHOLECYSTECTOMY N/A 2022    Procedure: CHOLECYSTECTOMY, LAPAROSCOPIC;  Surgeon: Grey Perez MD;  Location: [facility];  Service: General;  Laterality: N/A;    PLACEMENT OF DUAL-LUMEN VASCULAR CATHETER Left 2022    Procedure: INSERTION-CATHETER-JOSEPH;  Surgeon: Dionte Gan,  MD;  Location: [facility];  Service: General;  Laterality: Left;    PLACEMENT OF DUAL-LUMEN VASCULAR CATHETER Right 07/26/2022    Procedure: INSERTION-CATHETER-Hemosplit;  Surgeon: Dionte Gan MD;  Location: [facility];  Service: General;  Laterality: Right;       Past Social History:  Social History     Socioeconomic History    Marital status:    Tobacco Use    Smoking status: Never    Smokeless tobacco: Never   Substance and Sexual Activity    Alcohol use: Not Currently    Drug use: No    Sexual activity: Not Currently     Partners: Male     Birth control/protection: I.U.D.     Social Determinants of Health     Financial Resource Strain: Unknown    Difficulty of Paying Living Expenses: Patient refused   Food Insecurity: Unknown    Worried About Running Out of Food in the Last Year: Patient refused    Ran Out of Food in the Last Year: Patient refused   Transportation Needs: Unknown    Lack of Transportation (Medical): Patient refused    Lack of Transportation (Non-Medical): Patient refused   Physical Activity: Unknown    Days of Exercise per Week: Patient refused    Minutes of Exercise per Session: Patient refused   Stress: Unknown    Feeling of Stress : Patient refused   Social Connections: Unknown    Frequency of Communication with Friends and Family: Patient refused    Frequency of Social Gatherings with Friends and Family: Patient refused    Attends Protestant Services: Patient refused    Active Member of Clubs or Organizations: Patient refused    Attends Club or Organization Meetings: Patient refused    Marital Status: Patient refused   Housing Stability: Unknown    Unable to Pay for Housing in the Last Year: Patient refused    Unstable Housing in the Last Year: Patient refused       Medications:  No current facility-administered medications on file prior to encounter.     Current Outpatient Medications on File Prior to Encounter   Medication Sig Dispense Refill    albuterol (PROVENTIL/VENTOLIN HFA) 90  mcg/actuation inhaler Inhale 2 puffs into the lungs every 6 (six) hours as needed for Wheezing. Rescue 18 g 3    amLODIPine (NORVASC) 10 MG tablet Take 1 tablet (10 mg total) by mouth once daily. 30 tablet 11    amLODIPine (NORVASC) 10 MG tablet Take 1 tablet every day by oral route for 30 days. (Patient taking differently: Take 10 mg by mouth once daily.) 30 tablet 2    apixaban (ELIQUIS) 5 mg Tab Take 5 mg by mouth 2 (two) times daily.      apixaban (ELIQUIS) 5 mg Tab Take 1 tablet (5 mg total) by mouth 2 (two) times daily. 60 tablet 2    apixaban (ELIQUIS) 5 mg Tab Take 1 tablet twice a day by oral route for 30 days. (Patient taking differently: Take 5 mg by mouth 2 (two) times daily.) 60 tablet 2    BINAXNOW COVID-19 AG SELF TEST Kit Take 1 Dose by mouth once.      carvediloL (COREG) 25 MG tablet Take 1 tablet twice a day by oral route for 30 days. (Patient taking differently: Take 25 mg by mouth 2 (two) times daily.) 60 tablet 2    cloNIDine 0.2 mg/24 hr td ptwk (CATAPRES) 0.2 mg/24 hr Apply 1 patch to skin once every week. (Patient taking differently: Place 1 patch onto the skin once a week.) 4 patch 2    dicyclomine (BENTYL) 10 MG capsule Take 1 capsule 3 times a day by oral route for 30 days. (Patient taking differently: Take 10 mg by mouth 3 (three) times daily.) 90 capsule 2    ferrous sulfate 325 (65 FE) MG EC tablet Take 325 mg by mouth once daily.      FLUoxetine 20 MG capsule Take 1 capsule (20 mg total) by mouth once daily. 30 capsule 11    FLUoxetine 20 MG capsule Take 1 capsule every day by oral route for 30 days. (Patient taking differently: Take 20 mg by mouth once daily.) 30 capsule 2    furosemide (LASIX) 40 MG tablet Take 40 mg by mouth 2 (two) times a day.      gabapentin (NEURONTIN) 100 MG capsule Take 1 capsule (100 mg total) by mouth 2 (two) times daily. 90 capsule 2    gabapentin (NEURONTIN) 100 MG capsule Take 1 capsule by mouth 3 times daily (Patient taking differently: Take 100 mg by  mouth 3 (three) times daily.) 90 capsule 2    hydrALAZINE (APRESOLINE) 100 MG tablet Take 1 tablet twice a day by oral route for 30 days. (Patient taking differently: Take 100 mg by mouth 2 (two) times daily.) 60 tablet 2    HYDROcodone-acetaminophen (NORCO)  mg per tablet Take 1 tablet by mouth every 4 (four) hours as needed for Pain.      HYDROmorphone (DILAUDID) 4 MG tablet Take 4 mg by mouth every 12 (twelve) hours as needed for Pain.      isosorbide mononitrate (IMDUR) 60 MG 24 hr tablet Take 2 tablets (120 mg total) by mouth once daily. 30 tablet 11    isosorbide mononitrate (IMDUR) 60 MG 24 hr tablet Take 1 tablet every day by oral route for 30 days. (Patient taking differently: Take 60 mg by mouth once daily.) 30 tablet 2    levETIRAcetam (KEPPRA) 500 MG Tab Take 1 tablet (500 mg total) by mouth 2 (two) times daily. 60 tablet 11    levETIRAcetam (KEPPRA) 500 MG Tab Take 1 tablet twice a day by oral route for 30 days. (Patient taking differently: Take 500 mg by mouth once daily.) 60 tablet 2    losartan-hydrochlorothiazide 100-12.5 mg (HYZAAR) 100-12.5 mg Tab Take 1 tablet by mouth once daily.      mirtazapine (REMERON SOL-TAB) 15 MG disintegrating tablet Take 1 tablet (15 mg total) by mouth nightly. 90 tablet 0    ondansetron (ZOFRAN) 4 MG tablet Take 1 tablet (4 mg total) by mouth every 8 (eight) hours as needed for Nausea. 60 tablet 2    ondansetron (ZOFRAN-ODT) 4 MG TbDL Place1 tablet on the tongue every 8 hours as needed (Patient taking differently: Take 4 mg by mouth every 6 (six) hours as needed.) 24 tablet 0    pantoprazole (PROTONIX) 40 MG tablet Take 40 mg by mouth once daily.      pantoprazole (PROTONIX) 40 MG tablet Take 1 tablet every day by oral route for 30 days. (Patient taking differently: Take 40 mg by mouth once daily.) 30 tablet 2    promethazine (PHENERGAN) 25 MG suppository Place 1 suppository (25 mg total) rectally every 6 (six) hours as needed for Nausea. 10 suppository 0     "torsemide (DEMADEX) 100 MG Tab Take 100 mg by mouth once daily.      [DISCONTINUED] atenoloL (TENORMIN) 50 MG tablet Take 1 tablet (50 mg total) by mouth every other day. 45 tablet 3    [DISCONTINUED] omeprazole (PRILOSEC) 20 MG capsule Take 2 capsules (40 mg total) by mouth once daily. for 10 days 20 capsule 0     Scheduled Meds:   amLODIPine  10 mg Oral Daily    apixaban  2.5 mg Oral BID    carvediloL  6.25 mg Oral BID    epoetin teresa-epbx  10,000 Units Subcutaneous Every Tues, Thurs, Sat    hydrALAZINE  100 mg Oral Q12H    levETIRAcetam  500 mg Oral BID    mupirocin   Nasal BID    pantoprazole  40 mg Oral Daily     Continuous Infusions:      PRN Meds:.acetaminophen, dextrose 50 % in water (D50W), dextrose 50%, heparin (porcine), HYDROmorphone, ondansetron, Pharmacy to dose Vancomycin consult **AND** vancomycin - pharmacy to dose    Allergies:  Penicillins    Past Family History:  Reviewed; refer to Hospitalist Admission Note    Review of Systems:  Review of Systems - All 14 systems reviewed and negative, except as noted in HPI    Physical Exam:    BP (!) 194/107 (BP Location: Right arm, Patient Position: Sitting)   Pulse 95   Temp 97.7 °F (36.5 °C) (Oral)   Resp 20   Ht 5' 2" (1.575 m)   Wt 57.2 kg (126 lb 1.7 oz)   LMP  (LMP Unknown)   SpO2 99%   Breastfeeding No   BMI 23.06 kg/m²     General Appearance:    No distress   Head:    Normocephalic, without obvious abnormality, atraumatic   Eyes:    PER, conjunctiva/corneas clear, EOM's intact in both eyes        Throat:   Lips, mucosa, and tongue normal; teeth and gums normal   Back:     Symmetric, no curvature, ROM normal, no CVA tenderness   Lungs:     Clear to auscultation bilaterally, respirations unlabored   Chest wall:    No tenderness or deformity   Heart:    Regular rate and rhythm, S1 and S2 normal, no murmur, rub   or gallop   Abdomen:     Soft, non-tender, bowel sounds active all four quadrants,     no masses, no organomegaly   Extremities:   " Extremities normal, atraumatic, no cyanosis or edema   Pulses:   2+ and symmetric all extremities   MSK:   No joint or muscle swelling, tenderness or deformity   Skin:   Skin color, texture, turgor normal, no rashes or lesions   Neurologic:     No flap     Results:  Lab Results   Component Value Date     (L) 03/12/2023    K 4.6 03/12/2023     03/12/2023    CO2 21 (L) 03/12/2023    BUN 21 (H) 03/12/2023    CREATININE 3.2 (H) 03/12/2023    CALCIUM 8.8 03/12/2023    ANIONGAP 12 03/12/2023    ESTGFRAFRICA 20 (A) 07/31/2022    EGFRNONAA 18 (A) 07/31/2022       Lab Results   Component Value Date    CALCIUM 8.8 03/12/2023    PHOS 3.8 02/08/2023       Recent Labs   Lab 03/12/23  0454   WBC 5.03   RBC 2.80*   HGB 8.2*   HCT 25.7*   *   MCV 92   MCH 29.3   MCHC 31.9*            I have personally reviewed pertinent radiological imaging and reports.    Patient care was time spent personally by me on the following activities:   Obtaining a history  Examination of patient.  Providing medical care at the patients bedside.  Developing a treatment plan with patient or surrogate and bedside caregivers  Ordering and reviewing laboratory studies, radiographic studies, pulse oximetry.  Ordering and performing treatments and interventions.  Evaluation of patient's response to treatment.  Discussions with consultants while on the unit and immediately available to the patient.  Re-evaluation of the patient's condition.  Documentation in the medical record.     Hugh Figueroa MD  Nephrology  [facility]  [phone]

## 2023-03-13 VITALS
BODY MASS INDEX: 23.21 KG/M2 | TEMPERATURE: 98 F | WEIGHT: 126.13 LBS | OXYGEN SATURATION: 95 % | RESPIRATION RATE: 17 BRPM | SYSTOLIC BLOOD PRESSURE: 159 MMHG | HEIGHT: 62 IN | HEART RATE: 86 BPM | DIASTOLIC BLOOD PRESSURE: 87 MMHG

## 2023-03-13 PROBLEM — E16.2 HYPOGLYCEMIA: Status: RESOLVED | Noted: 2023-01-25 | Resolved: 2023-03-13

## 2023-03-13 PROBLEM — T68.XXXA HYPOTHERMIA: Status: RESOLVED | Noted: 2023-03-10 | Resolved: 2023-03-13

## 2023-03-13 PROBLEM — R10.84 INTRACTABLE GENERALIZED ABDOMINAL PAIN: Status: RESOLVED | Noted: 2022-12-02 | Resolved: 2023-03-13

## 2023-03-13 LAB
ALBUMIN SERPL BCP-MCNC: 3.1 G/DL (ref 3.5–5.2)
ALP SERPL-CCNC: 401 U/L (ref 55–135)
ALT SERPL W/O P-5'-P-CCNC: 58 U/L (ref 10–44)
ANION GAP SERPL CALC-SCNC: 13 MMOL/L (ref 8–16)
AST SERPL-CCNC: 30 U/L (ref 10–40)
BILIRUB SERPL-MCNC: 2.3 MG/DL (ref 0.1–1)
BUN SERPL-MCNC: 29 MG/DL (ref 6–20)
CALCIUM SERPL-MCNC: 8.8 MG/DL (ref 8.7–10.5)
CHLORIDE SERPL-SCNC: 104 MMOL/L (ref 95–110)
CO2 SERPL-SCNC: 20 MMOL/L (ref 23–29)
CREAT SERPL-MCNC: 4.5 MG/DL (ref 0.5–1.4)
ERYTHROCYTE [DISTWIDTH] IN BLOOD BY AUTOMATED COUNT: 18.6 % (ref 11.5–14.5)
EST. GFR  (NO RACE VARIABLE): 12.5 ML/MIN/1.73 M^2
GLUCOSE SERPL-MCNC: 203 MG/DL (ref 70–110)
GLUCOSE SERPL-MCNC: 232 MG/DL (ref 70–110)
GLUCOSE SERPL-MCNC: 254 MG/DL (ref 70–110)
HCT VFR BLD AUTO: 24.1 % (ref 37–48.5)
HGB BLD-MCNC: 7.8 G/DL (ref 12–16)
MCH RBC QN AUTO: 29.7 PG (ref 27–31)
MCHC RBC AUTO-ENTMCNC: 32.4 G/DL (ref 32–36)
MCV RBC AUTO: 92 FL (ref 82–98)
PLATELET # BLD AUTO: 105 K/UL (ref 150–450)
PMV BLD AUTO: 9.8 FL (ref 9.2–12.9)
POTASSIUM SERPL-SCNC: 4.6 MMOL/L (ref 3.5–5.1)
PROT SERPL-MCNC: 6.6 G/DL (ref 6–8.4)
RBC # BLD AUTO: 2.63 M/UL (ref 4–5.4)
SODIUM SERPL-SCNC: 137 MMOL/L (ref 136–145)
WBC # BLD AUTO: 4.87 K/UL (ref 3.9–12.7)

## 2023-03-13 PROCEDURE — 63600175 PHARM REV CODE 636 W HCPCS: Performed by: INTERNAL MEDICINE

## 2023-03-13 PROCEDURE — 25000003 PHARM REV CODE 250: Performed by: INTERNAL MEDICINE

## 2023-03-13 PROCEDURE — 80053 COMPREHEN METABOLIC PANEL: CPT | Performed by: INTERNAL MEDICINE

## 2023-03-13 PROCEDURE — 25000003 PHARM REV CODE 250: Performed by: HOSPITALIST

## 2023-03-13 PROCEDURE — 85027 COMPLETE CBC AUTOMATED: CPT | Performed by: INTERNAL MEDICINE

## 2023-03-13 PROCEDURE — G0378 HOSPITAL OBSERVATION PER HR: HCPCS

## 2023-03-13 PROCEDURE — 96376 TX/PRO/DX INJ SAME DRUG ADON: CPT | Mod: 59

## 2023-03-13 PROCEDURE — 36415 COLL VENOUS BLD VENIPUNCTURE: CPT | Performed by: INTERNAL MEDICINE

## 2023-03-13 RX ORDER — ONDANSETRON 4 MG/1
4 TABLET, ORALLY DISINTEGRATING ORAL
Status: DISCONTINUED | OUTPATIENT
Start: 2023-03-13 | End: 2023-03-13 | Stop reason: HOSPADM

## 2023-03-13 RX ORDER — HYDROMORPHONE HYDROCHLORIDE 2 MG/1
2 TABLET ORAL EVERY 4 HOURS PRN
Status: DISCONTINUED | OUTPATIENT
Start: 2023-03-13 | End: 2023-03-13 | Stop reason: HOSPADM

## 2023-03-13 RX ORDER — CARVEDILOL 25 MG/1
25 TABLET ORAL 2 TIMES DAILY
Status: DISCONTINUED | OUTPATIENT
Start: 2023-03-13 | End: 2023-03-13 | Stop reason: HOSPADM

## 2023-03-13 RX ADMIN — APIXABAN 2.5 MG: 2.5 TABLET, FILM COATED ORAL at 08:03

## 2023-03-13 RX ADMIN — AMLODIPINE BESYLATE 10 MG: 5 TABLET ORAL at 08:03

## 2023-03-13 RX ADMIN — HYDROMORPHONE HYDROCHLORIDE 0.2 MG: 1 INJECTION, SOLUTION INTRAMUSCULAR; INTRAVENOUS; SUBCUTANEOUS at 12:03

## 2023-03-13 RX ADMIN — PANTOPRAZOLE SODIUM 40 MG: 40 TABLET, DELAYED RELEASE ORAL at 08:03

## 2023-03-13 RX ADMIN — HYDRALAZINE HYDROCHLORIDE 100 MG: 25 TABLET, FILM COATED ORAL at 02:03

## 2023-03-13 RX ADMIN — HYDROMORPHONE HYDROCHLORIDE 0.2 MG: 1 INJECTION, SOLUTION INTRAMUSCULAR; INTRAVENOUS; SUBCUTANEOUS at 06:03

## 2023-03-13 RX ADMIN — LEVETIRACETAM 500 MG: 500 TABLET, FILM COATED ORAL at 08:03

## 2023-03-13 RX ADMIN — CARVEDILOL 25 MG: 25 TABLET, FILM COATED ORAL at 08:03

## 2023-03-13 RX ADMIN — HYDRALAZINE HYDROCHLORIDE 100 MG: 25 TABLET, FILM COATED ORAL at 05:03

## 2023-03-13 NOTE — NURSING
1418 Pt step mom called as requested  to review discharge medication, no answer.  Message left to return call.     1526 Pt dad called, unable to leave message.

## 2023-03-13 NOTE — PROGRESS NOTES
INPATIENT NEPHROLOGY Progress Note  [facility] NEPHROLOGY    Tabby Howard  03/13/2023    Reason for consultation:    esrd    Chief Complaint:   Chief Complaint   Patient presents with    Hypoglycemia     Initial BG 34, GCS 8. Given Amp D50, , GCS 15. 3rd BG 76 EMS started  D10. BG upon arrival to ED 86.     Abdominal Pain          History of Present Illness:    Per H and P    33 year old patient again getting admitted with Hypoglycemia and associated Hypothermia  Her Mother found her unresponsive in bed and activated EMS  When EMS arrived pt was unresponsive and was given dextrose for hypoglycemia  Condition came back to baseline and was escorted to ER  In ER pt was put on bear hugger for persistent hypothermia and was given iv abx  Pt is a frequent flier with only 10 ER visits and 6 hospital admissions this year(2023)  Since October 2022 she she already had 17  CT scans of abdomen /Pelvis/ Chest  She has drug seeking behavior and always her abdominal pain mysteriously disappear with iv dilaudid    3/11 Patient seen on hemodialysis for uremic clearance and ultrafiltration.    3/12  hypertensive.  Tolerated hd yesterday.    3/13 BP/VS improved, no acute RRT need today    Plan of Care:     ESRD on HD TTS  --continue dialysis per routine  --fluid restrict  --renal dose medication per routine  --continue outpt medication  --continue binders with meals    Anemia of CKD  --erythropoiesis stimulating agent with renal replacement therapy    Hyperphosphatemia/SHPT  --renal diet  --continue binders    Hypertension  --uf with hd  --fluid restrict  --low salt diet  --continue home medication  --hydralazine to 100mg tid    Hyponatremia  --fluid restrict  --UF with hd       Thank you for allowing us to participate in this patient's care. We will continue to follow.    Vital Signs:  Temp Readings from Last 3 Encounters:   03/13/23 98.2 °F (36.8 °C) (Oral)   03/06/23 98 °F (36.7 °C) (Oral)   02/08/23 97.4 °F (36.3 °C)  (Tympanic)       Pulse Readings from Last 3 Encounters:   03/13/23 84   03/06/23 78   02/08/23 98       BP Readings from Last 3 Encounters:   03/13/23 (!) 159/97   03/06/23 133/89   02/08/23 114/76       Weight:  Wt Readings from Last 3 Encounters:   03/11/23 57.2 kg (126 lb 1.7 oz)   03/02/23 58 kg (127 lb 13.9 oz)   02/08/23 59.9 kg (132 lb)       Medications:  No current facility-administered medications on file prior to encounter.     Current Outpatient Medications on File Prior to Encounter   Medication Sig Dispense Refill    albuterol (PROVENTIL/VENTOLIN HFA) 90 mcg/actuation inhaler Inhale 2 puffs into the lungs every 6 (six) hours as needed for Wheezing. Rescue 18 g 3    amLODIPine (NORVASC) 10 MG tablet Take 1 tablet every day by oral route for 30 days. (Patient taking differently: Take 10 mg by mouth once daily.) 30 tablet 2    apixaban (ELIQUIS) 5 mg Tab Take 1 tablet (5 mg total) by mouth 2 (two) times daily. 60 tablet 2    BINAXNOW COVID-19 AG SELF TEST Kit Take 1 Dose by mouth once.      carvediloL (COREG) 25 MG tablet Take 1 tablet twice a day by oral route for 30 days. (Patient taking differently: Take 25 mg by mouth 2 (two) times daily.) 60 tablet 2    cloNIDine 0.2 mg/24 hr td ptwk (CATAPRES) 0.2 mg/24 hr Apply 1 patch to skin once every week. (Patient taking differently: Place 1 patch onto the skin once a week.) 4 patch 2    ferrous sulfate 325 (65 FE) MG EC tablet Take 325 mg by mouth once daily.      FLUoxetine 20 MG capsule Take 1 capsule every day by oral route for 30 days. (Patient taking differently: Take 20 mg by mouth once daily.) 30 capsule 2    furosemide (LASIX) 40 MG tablet Take 40 mg by mouth 2 (two) times a day.      gabapentin (NEURONTIN) 100 MG capsule Take 1 capsule by mouth 3 times daily (Patient taking differently: Take 100 mg by mouth 3 (three) times daily.) 90 capsule 2    hydrALAZINE (APRESOLINE) 100 MG tablet Take 1 tablet twice a day by oral route for 30 days. (Patient  taking differently: Take 100 mg by mouth 2 (two) times daily.) 60 tablet 2    HYDROmorphone (DILAUDID) 4 MG tablet Take 4 mg by mouth every 12 (twelve) hours as needed for Pain.      isosorbide mononitrate (IMDUR) 60 MG 24 hr tablet Take 1 tablet every day by oral route for 30 days. (Patient taking differently: Take 60 mg by mouth once daily.) 30 tablet 2    levETIRAcetam (KEPPRA) 500 MG Tab Take 1 tablet twice a day by oral route for 30 days. (Patient taking differently: Take 500 mg by mouth once daily.) 60 tablet 2    mirtazapine (REMERON SOL-TAB) 15 MG disintegrating tablet Take 1 tablet (15 mg total) by mouth nightly. 90 tablet 0    ondansetron (ZOFRAN-ODT) 4 MG TbDL Place1 tablet on the tongue every 8 hours as needed (Patient taking differently: Take 4 mg by mouth every 6 (six) hours as needed.) 24 tablet 0    pantoprazole (PROTONIX) 40 MG tablet Take 1 tablet every day by oral route for 30 days. (Patient taking differently: Take 40 mg by mouth once daily.) 30 tablet 2    [DISCONTINUED] amLODIPine (NORVASC) 10 MG tablet Take 1 tablet (10 mg total) by mouth once daily. 30 tablet 11    [DISCONTINUED] apixaban (ELIQUIS) 5 mg Tab Take 5 mg by mouth 2 (two) times daily.      [DISCONTINUED] apixaban (ELIQUIS) 5 mg Tab Take 1 tablet twice a day by oral route for 30 days. (Patient taking differently: Take 5 mg by mouth 2 (two) times daily.) 60 tablet 2    [DISCONTINUED] atenoloL (TENORMIN) 50 MG tablet Take 1 tablet (50 mg total) by mouth every other day. 45 tablet 3    [DISCONTINUED] dicyclomine (BENTYL) 10 MG capsule Take 1 capsule (10 mg total) by mouth 3 (three) times daily. 90 capsule 0    [DISCONTINUED] dicyclomine (BENTYL) 10 MG capsule Take 1 capsule 3 times a day by oral route for 30 days. (Patient taking differently: Take 10 mg by mouth 3 (three) times daily.) 90 capsule 2    [DISCONTINUED] FLUoxetine 20 MG capsule Take 1 capsule (20 mg total) by mouth once daily. 30 capsule 11    [DISCONTINUED] gabapentin  "(NEURONTIN) 100 MG capsule Take 1 capsule (100 mg total) by mouth 2 (two) times daily. 90 capsule 2    [DISCONTINUED] HYDROcodone-acetaminophen (NORCO)  mg per tablet Take 1 tablet by mouth every 4 (four) hours as needed for Pain.      [DISCONTINUED] isosorbide mononitrate (IMDUR) 60 MG 24 hr tablet Take 2 tablets (120 mg total) by mouth once daily. 30 tablet 11    [DISCONTINUED] levETIRAcetam (KEPPRA) 500 MG Tab Take 1 tablet (500 mg total) by mouth 2 (two) times daily. 60 tablet 11    [DISCONTINUED] losartan-hydrochlorothiazide 100-12.5 mg (HYZAAR) 100-12.5 mg Tab Take 1 tablet by mouth once daily.      [DISCONTINUED] omeprazole (PRILOSEC) 20 MG capsule Take 2 capsules (40 mg total) by mouth once daily. for 10 days 20 capsule 0    [DISCONTINUED] ondansetron (ZOFRAN) 4 MG tablet Take 1 tablet (4 mg total) by mouth every 8 (eight) hours as needed for Nausea. 60 tablet 2    [DISCONTINUED] pantoprazole (PROTONIX) 40 MG tablet Take 40 mg by mouth once daily.      [DISCONTINUED] promethazine (PHENERGAN) 25 MG suppository Place 1 suppository (25 mg total) rectally every 6 (six) hours as needed for Nausea. 10 suppository 0    [DISCONTINUED] torsemide (DEMADEX) 100 MG Tab Take 100 mg by mouth once daily.       Scheduled Meds:   amLODIPine  10 mg Oral Daily    apixaban  2.5 mg Oral BID    carvediloL  25 mg Oral BID    epoetin teresa-epbx  10,000 Units Subcutaneous Every Tues, Thurs, Sat    hydrALAZINE  100 mg Oral Q8H    levETIRAcetam  500 mg Oral BID    mupirocin   Nasal BID    ondansetron  4 mg Oral TID AC    pantoprazole  40 mg Oral Daily     Continuous Infusions:      PRN Meds:.acetaminophen, dextrose 50 % in water (D50W), dextrose 50%, heparin (porcine), HYDROmorphone, ondansetron      Review of Systems:  Neg    Physical Exam:    BP (!) 159/97 (BP Location: Left arm, Patient Position: Lying)   Pulse 84   Temp 98.2 °F (36.8 °C) (Oral)   Resp 18   Ht 5' 2" (1.575 m)   Wt 57.2 kg (126 lb 1.7 oz)   LMP  (LMP " Unknown)   SpO2 (!) 94%   Breastfeeding No   BMI 23.06 kg/m²     General Appearance:    No distress   Head:    Normocephalic, without obvious abnormality, atraumatic   Eyes:    PER, conjunctiva/corneas clear, EOM's intact in both eyes        Throat:   Lips, mucosa, and tongue normal; teeth and gums normal   Back:     Symmetric, no curvature, ROM normal, no CVA tenderness   Lungs:     Clear to auscultation bilaterally, respirations unlabored   Chest wall:    No tenderness or deformity   Heart:    Regular rate and rhythm, S1 and S2 normal, no murmur, rub   or gallop   Abdomen:     Soft, non-tender, bowel sounds active all four quadrants,     no masses, no organomegaly   Extremities:   Extremities normal, atraumatic, no cyanosis or edema   Pulses:   2+ and symmetric all extremities   MSK:   No joint or muscle swelling, tenderness or deformity   Skin:   Skin color, texture, turgor normal, no rashes or lesions   Neurologic:     No flap     Results:  Lab Results   Component Value Date     03/13/2023    K 4.6 03/13/2023     03/13/2023    CO2 20 (L) 03/13/2023    BUN 29 (H) 03/13/2023    CREATININE 4.5 (H) 03/13/2023    CALCIUM 8.8 03/13/2023    ANIONGAP 13 03/13/2023    ESTGFRAFRICA 20 (A) 07/31/2022    EGFRNONAA 18 (A) 07/31/2022       Lab Results   Component Value Date    CALCIUM 8.8 03/13/2023    PHOS 3.8 02/08/2023       Recent Labs   Lab 03/13/23  0333   WBC 4.87   RBC 2.63*   HGB 7.8*   HCT 24.1*   *   MCV 92   MCH 29.7   MCHC 32.4            I have personally reviewed pertinent radiological imaging and reports.    Patient care was time spent personally by me on the following activities: > 35 min  Obtaining a history  Examination of patient.  Providing medical care at the patients bedside.  Developing a treatment plan with patient or surrogate and bedside caregivers  Ordering and reviewing laboratory studies, radiographic studies, pulse oximetry.  Ordering and performing treatments and  interventions.  Evaluation of patient's response to treatment.  Discussions with consultants while on the unit and immediately available to the patient.  Re-evaluation of the patient's condition.  Documentation in the medical record.   Prateek Clark MD MPH  [facility] [address] [address]  [phone] (p)  [phone] (f)

## 2023-03-13 NOTE — PLAN OF CARE
Patient to return to dialysis services at [facility] on [address], on T-TH-S schedule, verified with Adri at [facility].  Discharge orders and chart reviewed with no further post-acute discharge needs identified at this time.  At this time, patient is cleared for discharge from Case Management standpoint.        03/13/23 1147   Final Note   Assessment Type Final Discharge Note   Anticipated Discharge Disposition Home   Post-Acute Status   Post-Acute Authorization Dialysis   Diaylsis Status Set-up Complete/Auth obtained   Coverage Medicaid   Discharge Delays None known at this time

## 2023-03-13 NOTE — PROGRESS NOTES
Pharmacy Consult Discontinuation: Vancomycin    Tabby Howard [id number] is a 33 y.o. female was consulted for vancomycin pharmacotherapy management by pharmacy.    Pharmacy consult for vancomycin dosing is no longer required.  Vancomycin was discontinued 03/13/2023 @0823 by Dr Jefferson.    Thank you for allowing us to participate in this patient's care. Should you have any questions or concerns please feel free to contact the pharmacy department at [phone].    Navdeep Lanza, NeilD

## 2023-03-13 NOTE — PLAN OF CARE
Problem: Adult Inpatient Plan of Care  Goal: Plan of Care Review  Outcome: Ongoing, Progressing  Goal: Readiness for Transition of Care  Outcome: Ongoing, Progressing     Problem: Diabetes Comorbidity  Goal: Blood Glucose Level Within Targeted Range  Outcome: Ongoing, Progressing

## 2023-03-13 NOTE — PLAN OF CARE
Spoke with Adri at [facility] on [address] [phone] who states patient is active on dialysis service with them on a T-Th-S schedule and is expected to return to them when d/c. Rightfaxed H&P and d/c summary to Adri at [phone] per her request.

## 2023-03-14 NOTE — HOSPITAL COURSE
33-year-old lady with prior history of ESRD on hemodialysis(T-Th-S),  gastroparesis, seizure, C diff infection, diabetes, poor vision admitted with Hypoglycemia and associated Hypothermia. Her Mother found her unresponsive in bed and activated EMS. When EMS arrived pt was unresponsive and was given dextrose for hypoglycemia. Condition came back to baseline and was escorted to ER. In ER pt was put on bear hugger for persistent hypothermia and was given iv abx.  She improved remarkably after correction of her hypoglycemia.  This patient has at least 20 admissions in last 9 months, 17 CT scans of abdomen/pelvis/chest in last 6 months has drug-seeking behavior and always her abdominal pain mysteriously gets better with IV Dilaudid only.  In the past extensive workup including imaging, EGD, colonoscopy did not reveal etiology of her abdominal pain and it was concluded that she suffers from gastroparesis and IV opiate seeking behavior.  She has received maximum benefit of inpatient stay for her presenting symptoms.  Considering her low A1c I discontinued her insulin.  She has numerous duplicate prescriptions and I tried my best to reconcile her discharge medication.  I doubt that she ever picks up her medications.  Very poor prognosis due to her ongoing noncompliance, poor insight and appears to have poor social support.  I am also concerned about her radiation exposure considering frequent CT scans.     I have seen the patient on the day of discharge and reviewed the discharge instructions as outlined below. Patient verbalized understanding and is aware to contact primary care physician or return to ED if new or worsening symptoms.

## 2023-03-14 NOTE — DISCHARGE SUMMARY
[facility] Medicine  Discharge Summary      Patient Name: Tabby Howard  MRN: [id number]  UNIQUE: [id number]  Patient Class: OP- Observation  Admission Date: 3/10/2023  Hospital Length of Stay: 0 days  Discharge Date and Time: 3/13/2023  6:50 PM  Attending Physician: Emani att. providers found   Discharging Provider: Amalia Jefferson MD  Primary Care Provider: Primary Doctor Emani    Primary Care Team: Networked reference to record PCT     HPI:   33 year old patient again getting admitted with Hypoglycemia and associated Hypothermia  Her Mother found her unresponsive in bed and activated EMS  When EMS arrived pt was unresponsive and was given dextrose for hypoglycemia  Condition came back to baseline and was escorted to ER  In ER pt was put on bear hugger for persistent hypothermia and was given iv abx  Pt is a frequent flier with only 10 ER visits and 6 hospital admissions this year(2023)  Since October 2022 she she already had 17  CT scans of abdomen /Pelvis/ Chest  She has drug seeking behavior and always her abdominal pain mysteriously disappear with iv dilaudid  Also has H/o hospital shopping         * No surgery found *      Hospital Course:   33-year-old lady with prior history of ESRD on hemodialysis(T-Th-S),  gastroparesis, seizure, C diff infection, diabetes, poor vision admitted with Hypoglycemia and associated Hypothermia. Her Mother found her unresponsive in bed and activated EMS. When EMS arrived pt was unresponsive and was given dextrose for hypoglycemia. Condition came back to baseline and was escorted to ER. In ER pt was put on bear hugger for persistent hypothermia and was given iv abx.  She improved remarkably after correction of her hypoglycemia.  This patient has at least 20 admissions in last 9 months, 17 CT scans of abdomen/pelvis/chest in last 6 months has drug-seeking behavior and always her abdominal pain mysteriously gets better with IV Dilaudid only.  In the past extensive  workup including imaging, EGD, colonoscopy did not reveal etiology of her abdominal pain and it was concluded that she suffers from gastroparesis and IV opiate seeking behavior.  She has received maximum benefit of inpatient stay for her presenting symptoms.  Considering her low A1c I discontinued her insulin.  She has numerous duplicate prescriptions and I tried my best to reconcile her discharge medication.  I doubt that she ever picks up her medications.  Very poor prognosis due to her ongoing noncompliance, poor insight and appears to have poor social support.  I am also concerned about her radiation exposure considering frequent CT scans.     I have seen the patient on the day of discharge and reviewed the discharge instructions as outlined below. Patient verbalized understanding and is aware to contact primary care physician or return to ED if new or worsening symptoms.        Goals of Care Treatment Preferences:  Code Status: Full Code    Health care agent: Step-Mother Tanya Howard / Father Garcia Howard  Health care agent number: [phone] / [phone]          What is most important right now is to focus on remaining as independent as possible.  Accordingly, we have decided that the best plan to meet the patient's goals includes continuing with treatment.      Consults:   Consults (From admission, onward)        Status Ordering Provider     Inpatient consult to Nephrology  Once        Provider:  Mando Benitez MD    Completed GENET GARAY          No new Assessment & Plan notes have been filed under this hospital service since the last note was generated.  Service: Hospital Medicine    Final Active Diagnoses:    Diagnosis Date Noted POA    Frequent hospital admissions [Z78.9] 03/10/2023 Yes    Drug-seeking behavior [Z76.5] 01/24/2023 Yes    Deep vein thrombosis (DVT) of non-extremity vein [I82.90] 07/26/2022 Yes     Chronic    ESRD (end stage renal disease) on dialysis [N18.6, Z99.2]  07/22/2022 Not Applicable     Chronic    Seizure disorder [G40.909] 05/29/2022 Yes     Chronic    Pericardial effusion [I31.39] 03/07/2022 Yes      Problems Resolved During this Admission:    Diagnosis Date Noted Date Resolved POA    PRINCIPAL PROBLEM:  Hypothermia [T68.XXXA] 03/10/2023 03/13/2023 Yes    Hypoglycemia [E16.2] 01/25/2023 03/13/2023 Yes    Intractable generalized abdominal pain [R10.84] 12/02/2022 03/13/2023 Yes       Discharged Condition: good    Disposition: Home or Self Care    Follow Up:   Follow-up Information     PCP Follow up in 1 week(s).           Dialysis unit Follow up on 3/14/2023.                     Patient Instructions:      Diet renal     Notify your health care provider if you experience any of the following:  temperature >100.4     Activity as tolerated       Significant Diagnostic Studies: Labs: All labs within the past 24 hours have been reviewed    Pending Diagnostic Studies:     None         Medications:  Reconciled Home Medications:      Medication List      CHANGE how you take these medications    ELIQUIS 5 mg Tab  Generic drug: apixaban  Take 1 tablet (5 mg total) by mouth 2 (two) times daily.  What changed: Another medication with the same name was removed. Continue taking this medication, and follow the directions you see here.        CONTINUE taking these medications    albuterol 90 mcg/actuation inhaler  Commonly known as: PROVENTIL/VENTOLIN HFA  Inhale 2 puffs into the lungs every 6 (six) hours as needed for Wheezing. Rescue     BINAXNOW COVID-19 AG SELF TEST Kit  Generic drug: COVID-19 antigen test  Take 1 Dose by mouth once.     ferrous sulfate 325 (65 FE) MG EC tablet  Take 325 mg by mouth once daily.     furosemide 40 MG tablet  Commonly known as: LASIX  Take 40 mg by mouth 2 (two) times a day.     HYDROmorphone 4 MG tablet  Commonly known as: DILAUDID  Take 4 mg by mouth every 12 (twelve) hours as needed for Pain.     mirtazapine 15 MG disintegrating  tablet  Commonly known as: REMERON SOL-TAB  Take 1 tablet (15 mg total) by mouth nightly.        STOP taking these medications    amLODIPine 10 MG tablet  Commonly known as: NORVASC     dicyclomine 10 MG capsule  Commonly known as: BENTYL     FLUoxetine 20 MG capsule     gabapentin 100 MG capsule  Commonly known as: NEURONTIN     HYDROcodone-acetaminophen  mg per tablet  Commonly known as: NORCO     isosorbide mononitrate 60 MG 24 hr tablet  Commonly known as: IMDUR     levETIRAcetam 500 MG Tab  Commonly known as: KEPPRA     losartan-hydrochlorothiazide 100-12.5 mg 100-12.5 mg Tab  Commonly known as: HYZAAR     ondansetron 4 MG tablet  Commonly known as: ZOFRAN     pantoprazole 40 MG tablet  Commonly known as: PROTONIX     promethazine 25 MG suppository  Commonly known as: PHENERGAN     torsemide 100 MG Tab  Commonly known as: DEMADEX        ASK your doctor about these medications    amLODIPine 10 MG tablet  Commonly known as: NORVASC  Take 1 tablet every day by oral route for 30 days.     carvediloL 25 MG tablet  Commonly known as: COREG  Take 1 tablet twice a day by oral route for 30 days.     cloNIDine 0.2 mg/24 hr td ptwk 0.2 mg/24 hr  Commonly known as: CATAPRES  Apply 1 patch to skin once every week.     FLUoxetine 20 MG capsule  Take 1 capsule every day by oral route for 30 days.     gabapentin 100 MG capsule  Commonly known as: NEURONTIN  Take 1 capsule by mouth 3 times daily     hydrALAZINE 100 MG tablet  Commonly known as: APRESOLINE  Take 1 tablet twice a day by oral route for 30 days.     isosorbide mononitrate 60 MG 24 hr tablet  Commonly known as: IMDUR  Take 1 tablet every day by oral route for 30 days.     levETIRAcetam 500 MG Tab  Commonly known as: KEPPRA  Take 1 tablet twice a day by oral route for 30 days.     ondansetron 4 MG Tbdl  Commonly known as: ZOFRAN-ODT  Place1 tablet on the tongue every 8 hours as needed     pantoprazole 40 MG tablet  Commonly known as: PROTONIX  Take 1 tablet  every day by oral route for 30 days.            Indwelling Lines/Drains at time of discharge:   Lines/Drains/Airways     Central Venous Catheter Line  Duration                Hemodialysis Catheter 12/09/22 right subclavian 94 days                Time spent on the discharge of patient: 35 minutes         Amalia Jefferson MD  Department of Hospital Medicine  [facility]

## 2023-03-15 ENCOUNTER — SOCIAL WORK (OUTPATIENT)
Dept: TRANSPLANT | Facility: CLINIC | Age: 33
End: 2023-03-15
Payer: MEDICAID

## 2023-03-15 LAB
BACTERIA BLD CULT: NORMAL
BACTERIA BLD CULT: NORMAL

## 2023-03-15 NOTE — PROGRESS NOTES
" JULIANO received message from Intake RN Coordinator regarding note from pt's latest ED visit stating:    A hospital discharge note from her most recent d/c on 3/13/2023 that states:  " Patient is a frequent flyer with only  ER visits and 6 hospital admissions in this year (2023). Since October 2022 she has already had 17 CTscans of Abdominal/Pelvis/Chest. She has drug seeking behavior and always her abdominal pain mysteriously disappear with IV Dilaudid. Also has a H/O hospital shopping."      RN Coordinator seeking additional info.  JULIANO contacted pt's dialysis unit and spoke with Alfie, unit JULIANO who reports pt has good dialysis compliance "When she is not in the hospital".  She stated of pt,"she is very sick".  Alfie stated pt has frequent abdominal pain secondary to gastroparesis and goes to ED seeking relief.  Per DUSW, pt has no hx of mental health issues  or drug abuse, exhibits appropriate behavior in unit, has support from father and occasional difficulty with transportation.  DUSW added that pt's labs are stable, "except for blood sugars which are all over the place".       Txp juliano shared this info with RN Coordinator via telephone call.  JULIANO remains available.    "

## 2023-03-17 ENCOUNTER — TELEPHONE (OUTPATIENT)
Dept: OPHTHALMOLOGY | Facility: CLINIC | Age: 33
End: 2023-03-17
Payer: MEDICAID

## 2023-03-17 NOTE — TELEPHONE ENCOUNTER
Informed dad that patient arrival time changed for surgery on Monday to 8:00. Dad understood, no problem

## 2023-03-20 ENCOUNTER — TELEPHONE (OUTPATIENT)
Dept: TRANSPLANT | Facility: CLINIC | Age: 33
End: 2023-03-20
Payer: MEDICAID

## 2023-03-20 ENCOUNTER — HOSPITAL ENCOUNTER (OUTPATIENT)
Facility: HOSPITAL | Age: 33
Discharge: HOME | End: 2023-03-21
Attending: STUDENT IN AN ORGANIZED HEALTH CARE EDUCATION/TRAINING PROGRAM | Admitting: STUDENT IN AN ORGANIZED HEALTH CARE EDUCATION/TRAINING PROGRAM
Payer: MEDICAID

## 2023-03-20 DIAGNOSIS — N18.6 ANEMIA IN ESRD (END-STAGE RENAL DISEASE): ICD-10-CM

## 2023-03-20 DIAGNOSIS — D64.9 SYMPTOMATIC ANEMIA: Primary | ICD-10-CM

## 2023-03-20 DIAGNOSIS — D63.1 ANEMIA IN ESRD (END-STAGE RENAL DISEASE): ICD-10-CM

## 2023-03-20 DIAGNOSIS — R73.9 HYPERGLYCEMIA: ICD-10-CM

## 2023-03-20 DIAGNOSIS — R07.9 CHEST PAIN: ICD-10-CM

## 2023-03-20 LAB
ABO + RH BLD: NORMAL
ALBUMIN SERPL BCP-MCNC: 3 G/DL (ref 3.5–5.2)
ALLENS TEST: ABNORMAL
ALP SERPL-CCNC: 398 U/L (ref 55–135)
ALT SERPL W/O P-5'-P-CCNC: 37 U/L (ref 10–44)
ANION GAP SERPL CALC-SCNC: 17 MMOL/L (ref 8–16)
APTT BLDCRRT: 23.1 SEC (ref 21–32)
AST SERPL-CCNC: 31 U/L (ref 10–40)
B-OH-BUTYR BLD STRIP-SCNC: 0.1 MMOL/L (ref 0–0.5)
BASOPHILS # BLD AUTO: 0.03 K/UL (ref 0–0.2)
BASOPHILS NFR BLD: 0.7 % (ref 0–1.9)
BILIRUB SERPL-MCNC: 2.6 MG/DL (ref 0.1–1)
BLD GP AB SCN CELLS X3 SERPL QL: NORMAL
BLD PROD TYP BPU: NORMAL
BLD PROD TYP BPU: NORMAL
BLOOD UNIT EXPIRATION DATE: NORMAL
BLOOD UNIT EXPIRATION DATE: NORMAL
BLOOD UNIT TYPE CODE: 6200
BLOOD UNIT TYPE CODE: 6200
BLOOD UNIT TYPE: NORMAL
BLOOD UNIT TYPE: NORMAL
BUN SERPL-MCNC: 54 MG/DL (ref 6–20)
CALCIUM SERPL-MCNC: 8.9 MG/DL (ref 8.7–10.5)
CHLORIDE SERPL-SCNC: 100 MMOL/L (ref 95–110)
CO2 SERPL-SCNC: 24 MMOL/L (ref 23–29)
CODING SYSTEM: NORMAL
CODING SYSTEM: NORMAL
CREAT SERPL-MCNC: 6 MG/DL (ref 0.5–1.4)
CROSSMATCH INTERPRETATION: NORMAL
CROSSMATCH INTERPRETATION: NORMAL
DIFFERENTIAL METHOD: ABNORMAL
DISPENSE STATUS: NORMAL
DISPENSE STATUS: NORMAL
EOSINOPHIL # BLD AUTO: 0 K/UL (ref 0–0.5)
EOSINOPHIL NFR BLD: 0.9 % (ref 0–8)
ERYTHROCYTE [DISTWIDTH] IN BLOOD BY AUTOMATED COUNT: 19.2 % (ref 11.5–14.5)
EST. GFR  (NO RACE VARIABLE): 8.8 ML/MIN/1.73 M^2
FERRITIN SERPL-MCNC: ABNORMAL NG/ML (ref 20–300)
FOLATE SERPL-MCNC: 8.3 NG/ML (ref 4–24)
GLUCOSE SERPL-MCNC: 154 MG/DL (ref 70–110)
HAPTOGLOB SERPL-MCNC: <10 MG/DL (ref 30–250)
HCG INTACT+B SERPL-ACNC: <2.4 MIU/ML
HCO3 UR-SCNC: 28.9 MMOL/L (ref 24–28)
HCT VFR BLD AUTO: 17 % (ref 37–48.5)
HCT VFR BLD CALC: 17 %PCV (ref 36–54)
HGB BLD-MCNC: 5.6 G/DL (ref 12–16)
IMM GRANULOCYTES # BLD AUTO: 0.04 K/UL (ref 0–0.04)
IMM GRANULOCYTES NFR BLD AUTO: 0.9 % (ref 0–0.5)
INR PPP: 1.1 (ref 0.8–1.2)
IRON SERPL-MCNC: 174 UG/DL (ref 30–160)
LACTATE SERPL-SCNC: 0.7 MMOL/L (ref 0.5–2.2)
LACTATE SERPL-SCNC: 2.6 MMOL/L (ref 0.5–2.2)
LDH SERPL L TO P-CCNC: 451 U/L (ref 110–260)
LIPASE SERPL-CCNC: 22 U/L (ref 4–60)
LYMPHOCYTES # BLD AUTO: 0.6 K/UL (ref 1–4.8)
LYMPHOCYTES NFR BLD: 13.7 % (ref 18–48)
MCH RBC QN AUTO: 29.9 PG (ref 27–31)
MCHC RBC AUTO-ENTMCNC: 32.9 G/DL (ref 32–36)
MCV RBC AUTO: 91 FL (ref 82–98)
MONOCYTES # BLD AUTO: 0.4 K/UL (ref 0.3–1)
MONOCYTES NFR BLD: 8 % (ref 4–15)
NEUTROPHILS # BLD AUTO: 3.4 K/UL (ref 1.8–7.7)
NEUTROPHILS NFR BLD: 75.8 % (ref 38–73)
NRBC BLD-RTO: 0 /100 WBC
NUM UNITS TRANS PACKED RBC: NORMAL
PCO2 BLDA: 50 MMHG (ref 35–45)
PH SMN: 7.37 [PH] (ref 7.35–7.45)
PLATELET # BLD AUTO: 89 K/UL (ref 150–450)
PMV BLD AUTO: 10.1 FL (ref 9.2–12.9)
PO2 BLDA: 32 MMHG (ref 40–60)
POC BE: 4 MMOL/L
POC IONIZED CALCIUM: 1.15 MMOL/L (ref 1.06–1.42)
POC SATURATED O2: 59 % (ref 95–100)
POC TCO2: 30 MMOL/L (ref 24–29)
POCT GLUCOSE: 174 MG/DL (ref 70–110)
POCT GLUCOSE: 216 MG/DL (ref 70–110)
POTASSIUM BLD-SCNC: 3.6 MMOL/L (ref 3.5–5.1)
POTASSIUM SERPL-SCNC: 3.7 MMOL/L (ref 3.5–5.1)
PROT SERPL-MCNC: 6.7 G/DL (ref 6–8.4)
PROTHROMBIN TIME: 11.3 SEC (ref 9–12.5)
RBC # BLD AUTO: 1.87 M/UL (ref 4–5.4)
RETICS/RBC NFR AUTO: 9.6 % (ref 0.5–2.5)
SAMPLE: ABNORMAL
SATURATED IRON: 71 % (ref 20–50)
SITE: ABNORMAL
SODIUM BLD-SCNC: 138 MMOL/L (ref 136–145)
SODIUM SERPL-SCNC: 141 MMOL/L (ref 136–145)
TOTAL IRON BINDING CAPACITY: 246 UG/DL (ref 250–450)
TRANS ERYTHROCYTES VOL PATIENT: NORMAL ML
TRANSFERRIN SERPL-MCNC: 166 MG/DL (ref 200–375)
VIT B12 SERPL-MCNC: 958 PG/ML (ref 210–950)
WBC # BLD AUTO: 4.52 K/UL (ref 3.9–12.7)

## 2023-03-20 PROCEDURE — 84466 ASSAY OF TRANSFERRIN: CPT | Mod: NTX | Performed by: STUDENT IN AN ORGANIZED HEALTH CARE EDUCATION/TRAINING PROGRAM

## 2023-03-20 PROCEDURE — 82803 BLOOD GASES ANY COMBINATION: CPT | Mod: NTX

## 2023-03-20 PROCEDURE — 83615 LACTATE (LD) (LDH) ENZYME: CPT | Mod: NTX | Performed by: STUDENT IN AN ORGANIZED HEALTH CARE EDUCATION/TRAINING PROGRAM

## 2023-03-20 PROCEDURE — 85610 PROTHROMBIN TIME: CPT | Mod: NTX | Performed by: STUDENT IN AN ORGANIZED HEALTH CARE EDUCATION/TRAINING PROGRAM

## 2023-03-20 PROCEDURE — 85014 HEMATOCRIT: CPT | Mod: NTX,91

## 2023-03-20 PROCEDURE — 25000003 PHARM REV CODE 250: Mod: NTX | Performed by: STUDENT IN AN ORGANIZED HEALTH CARE EDUCATION/TRAINING PROGRAM

## 2023-03-20 PROCEDURE — 82010 KETONE BODYS QUAN: CPT | Mod: NTX | Performed by: STUDENT IN AN ORGANIZED HEALTH CARE EDUCATION/TRAINING PROGRAM

## 2023-03-20 PROCEDURE — 99214 OFFICE O/P EST MOD 30 MIN: CPT | Mod: NTX,,, | Performed by: INTERNAL MEDICINE

## 2023-03-20 PROCEDURE — 85025 COMPLETE CBC W/AUTO DIFF WBC: CPT | Mod: 91,NTX | Performed by: STUDENT IN AN ORGANIZED HEALTH CARE EDUCATION/TRAINING PROGRAM

## 2023-03-20 PROCEDURE — P9016 RBC LEUKOCYTES REDUCED: HCPCS | Mod: NTX | Performed by: STUDENT IN AN ORGANIZED HEALTH CARE EDUCATION/TRAINING PROGRAM

## 2023-03-20 PROCEDURE — 99900035 HC TECH TIME PER 15 MIN (STAT): Mod: NTX

## 2023-03-20 PROCEDURE — 80047 BASIC METABLC PNL IONIZED CA: CPT | Mod: NTX

## 2023-03-20 PROCEDURE — 99223 PR INITIAL HOSPITAL CARE,LEVL III: ICD-10-PCS | Mod: NTX,,, | Performed by: STUDENT IN AN ORGANIZED HEALTH CARE EDUCATION/TRAINING PROGRAM

## 2023-03-20 PROCEDURE — G0378 HOSPITAL OBSERVATION PER HR: HCPCS | Mod: NTX

## 2023-03-20 PROCEDURE — 83690 ASSAY OF LIPASE: CPT | Mod: NTX | Performed by: STUDENT IN AN ORGANIZED HEALTH CARE EDUCATION/TRAINING PROGRAM

## 2023-03-20 PROCEDURE — 96372 THER/PROPH/DIAG INJ SC/IM: CPT | Mod: 59 | Performed by: STUDENT IN AN ORGANIZED HEALTH CARE EDUCATION/TRAINING PROGRAM

## 2023-03-20 PROCEDURE — 85025 COMPLETE CBC W/AUTO DIFF WBC: CPT | Mod: NTX | Performed by: STUDENT IN AN ORGANIZED HEALTH CARE EDUCATION/TRAINING PROGRAM

## 2023-03-20 PROCEDURE — 63600175 PHARM REV CODE 636 W HCPCS: Mod: NTX | Performed by: STUDENT IN AN ORGANIZED HEALTH CARE EDUCATION/TRAINING PROGRAM

## 2023-03-20 PROCEDURE — 99223 1ST HOSP IP/OBS HIGH 75: CPT | Mod: NTX,,, | Performed by: STUDENT IN AN ORGANIZED HEALTH CARE EDUCATION/TRAINING PROGRAM

## 2023-03-20 PROCEDURE — 82330 ASSAY OF CALCIUM: CPT | Mod: NTX

## 2023-03-20 PROCEDURE — 36415 COLL VENOUS BLD VENIPUNCTURE: CPT | Mod: NTX | Performed by: STUDENT IN AN ORGANIZED HEALTH CARE EDUCATION/TRAINING PROGRAM

## 2023-03-20 PROCEDURE — 83010 ASSAY OF HAPTOGLOBIN QUANT: CPT | Mod: NTX | Performed by: STUDENT IN AN ORGANIZED HEALTH CARE EDUCATION/TRAINING PROGRAM

## 2023-03-20 PROCEDURE — 85730 THROMBOPLASTIN TIME PARTIAL: CPT | Mod: NTX | Performed by: STUDENT IN AN ORGANIZED HEALTH CARE EDUCATION/TRAINING PROGRAM

## 2023-03-20 PROCEDURE — 82746 ASSAY OF FOLIC ACID SERUM: CPT | Mod: NTX | Performed by: STUDENT IN AN ORGANIZED HEALTH CARE EDUCATION/TRAINING PROGRAM

## 2023-03-20 PROCEDURE — 86900 BLOOD TYPING SEROLOGIC ABO: CPT | Mod: NTX | Performed by: STUDENT IN AN ORGANIZED HEALTH CARE EDUCATION/TRAINING PROGRAM

## 2023-03-20 PROCEDURE — 82607 VITAMIN B-12: CPT | Mod: NTX | Performed by: STUDENT IN AN ORGANIZED HEALTH CARE EDUCATION/TRAINING PROGRAM

## 2023-03-20 PROCEDURE — 36430 TRANSFUSION BLD/BLD COMPNT: CPT | Mod: NTX

## 2023-03-20 PROCEDURE — 93010 EKG 12-LEAD: ICD-10-PCS | Mod: NTX,,, | Performed by: INTERNAL MEDICINE

## 2023-03-20 PROCEDURE — 83605 ASSAY OF LACTIC ACID: CPT | Mod: NTX | Performed by: STUDENT IN AN ORGANIZED HEALTH CARE EDUCATION/TRAINING PROGRAM

## 2023-03-20 PROCEDURE — 93010 ELECTROCARDIOGRAM REPORT: CPT | Mod: NTX,,, | Performed by: INTERNAL MEDICINE

## 2023-03-20 PROCEDURE — 87040 BLOOD CULTURE FOR BACTERIA: CPT | Mod: NTX | Performed by: STUDENT IN AN ORGANIZED HEALTH CARE EDUCATION/TRAINING PROGRAM

## 2023-03-20 PROCEDURE — 83605 ASSAY OF LACTIC ACID: CPT | Mod: 91,NTX | Performed by: STUDENT IN AN ORGANIZED HEALTH CARE EDUCATION/TRAINING PROGRAM

## 2023-03-20 PROCEDURE — 96372 THER/PROPH/DIAG INJ SC/IM: CPT | Mod: 59,NTX | Performed by: STUDENT IN AN ORGANIZED HEALTH CARE EDUCATION/TRAINING PROGRAM

## 2023-03-20 PROCEDURE — 25500020 PHARM REV CODE 255: Mod: NTX | Performed by: STUDENT IN AN ORGANIZED HEALTH CARE EDUCATION/TRAINING PROGRAM

## 2023-03-20 PROCEDURE — 84295 ASSAY OF SERUM SODIUM: CPT | Mod: NTX

## 2023-03-20 PROCEDURE — 82728 ASSAY OF FERRITIN: CPT | Mod: NTX | Performed by: STUDENT IN AN ORGANIZED HEALTH CARE EDUCATION/TRAINING PROGRAM

## 2023-03-20 PROCEDURE — 99285 EMERGENCY DEPT VISIT HI MDM: CPT | Mod: 25,NTX

## 2023-03-20 PROCEDURE — 84702 CHORIONIC GONADOTROPIN TEST: CPT | Mod: NTX | Performed by: STUDENT IN AN ORGANIZED HEALTH CARE EDUCATION/TRAINING PROGRAM

## 2023-03-20 PROCEDURE — 93005 ELECTROCARDIOGRAM TRACING: CPT | Mod: NTX

## 2023-03-20 PROCEDURE — 96374 THER/PROPH/DIAG INJ IV PUSH: CPT | Mod: NTX

## 2023-03-20 PROCEDURE — 85045 AUTOMATED RETICULOCYTE COUNT: CPT | Mod: NTX | Performed by: STUDENT IN AN ORGANIZED HEALTH CARE EDUCATION/TRAINING PROGRAM

## 2023-03-20 PROCEDURE — 84132 ASSAY OF SERUM POTASSIUM: CPT | Mod: NTX

## 2023-03-20 PROCEDURE — P9021 RED BLOOD CELLS UNIT: HCPCS | Mod: NTX | Performed by: STUDENT IN AN ORGANIZED HEALTH CARE EDUCATION/TRAINING PROGRAM

## 2023-03-20 PROCEDURE — 99214 PR OFFICE/OUTPT VISIT, EST, LEVL IV, 30-39 MIN: ICD-10-PCS | Mod: NTX,,, | Performed by: INTERNAL MEDICINE

## 2023-03-20 PROCEDURE — 96375 TX/PRO/DX INJ NEW DRUG ADDON: CPT | Mod: NTX

## 2023-03-20 PROCEDURE — 99285 PR EMERGENCY DEPT VISIT,LEVEL V: ICD-10-PCS | Mod: ,,, | Performed by: STUDENT IN AN ORGANIZED HEALTH CARE EDUCATION/TRAINING PROGRAM

## 2023-03-20 PROCEDURE — 86920 COMPATIBILITY TEST SPIN: CPT | Mod: NTX | Performed by: STUDENT IN AN ORGANIZED HEALTH CARE EDUCATION/TRAINING PROGRAM

## 2023-03-20 PROCEDURE — 80053 COMPREHEN METABOLIC PANEL: CPT | Mod: NTX | Performed by: STUDENT IN AN ORGANIZED HEALTH CARE EDUCATION/TRAINING PROGRAM

## 2023-03-20 PROCEDURE — 99285 EMERGENCY DEPT VISIT HI MDM: CPT | Mod: ,,, | Performed by: STUDENT IN AN ORGANIZED HEALTH CARE EDUCATION/TRAINING PROGRAM

## 2023-03-20 PROCEDURE — 82962 GLUCOSE BLOOD TEST: CPT | Mod: NTX

## 2023-03-20 PROCEDURE — 96361 HYDRATE IV INFUSION ADD-ON: CPT | Mod: NTX

## 2023-03-20 RX ORDER — INSULIN ASPART 100 [IU]/ML
0-5 INJECTION, SOLUTION INTRAVENOUS; SUBCUTANEOUS
Status: DISCONTINUED | OUTPATIENT
Start: 2023-03-20 | End: 2023-03-21 | Stop reason: HOSPADM

## 2023-03-20 RX ORDER — DEXTROSE 40 %
30 GEL (GRAM) ORAL
Status: DISCONTINUED | OUTPATIENT
Start: 2023-03-20 | End: 2023-03-21 | Stop reason: HOSPADM

## 2023-03-20 RX ORDER — PANTOPRAZOLE SODIUM 40 MG/1
40 TABLET, DELAYED RELEASE ORAL DAILY
Status: DISCONTINUED | OUTPATIENT
Start: 2023-03-21 | End: 2023-03-21 | Stop reason: HOSPADM

## 2023-03-20 RX ORDER — CLONIDINE 0.2 MG/24H
1 PATCH, EXTENDED RELEASE TRANSDERMAL WEEKLY
Status: DISCONTINUED | OUTPATIENT
Start: 2023-03-20 | End: 2023-03-21 | Stop reason: HOSPADM

## 2023-03-20 RX ORDER — TALC
6 POWDER (GRAM) TOPICAL NIGHTLY PRN
Status: DISCONTINUED | OUTPATIENT
Start: 2023-03-20 | End: 2023-03-21 | Stop reason: HOSPADM

## 2023-03-20 RX ORDER — NALOXONE HCL 0.4 MG/ML
0.02 VIAL (ML) INJECTION
Status: DISCONTINUED | OUTPATIENT
Start: 2023-03-20 | End: 2023-03-21 | Stop reason: HOSPADM

## 2023-03-20 RX ORDER — FAMOTIDINE 10 MG/ML
20 INJECTION INTRAVENOUS
Status: COMPLETED | OUTPATIENT
Start: 2023-03-20 | End: 2023-03-20

## 2023-03-20 RX ORDER — DEXTROSE 40 %
15 GEL (GRAM) ORAL
Status: DISCONTINUED | OUTPATIENT
Start: 2023-03-20 | End: 2023-03-21 | Stop reason: HOSPADM

## 2023-03-20 RX ORDER — ACETAMINOPHEN 325 MG/1
650 TABLET ORAL EVERY 4 HOURS PRN
Status: DISCONTINUED | OUTPATIENT
Start: 2023-03-20 | End: 2023-03-20

## 2023-03-20 RX ORDER — LEVETIRACETAM 500 MG/1
500 TABLET ORAL DAILY
Status: DISCONTINUED | OUTPATIENT
Start: 2023-03-21 | End: 2023-03-21 | Stop reason: HOSPADM

## 2023-03-20 RX ORDER — INSULIN ASPART 100 [IU]/ML
4 INJECTION, SOLUTION INTRAVENOUS; SUBCUTANEOUS
Status: DISCONTINUED | OUTPATIENT
Start: 2023-03-20 | End: 2023-03-20 | Stop reason: SDUPTHER

## 2023-03-20 RX ORDER — MORPHINE SULFATE 4 MG/ML
4 INJECTION, SOLUTION INTRAMUSCULAR; INTRAVENOUS
Status: COMPLETED | OUTPATIENT
Start: 2023-03-20 | End: 2023-03-20

## 2023-03-20 RX ORDER — GABAPENTIN 100 MG/1
100 CAPSULE ORAL 3 TIMES DAILY
Status: DISCONTINUED | OUTPATIENT
Start: 2023-03-20 | End: 2023-03-21 | Stop reason: HOSPADM

## 2023-03-20 RX ORDER — GLUCAGON 1 MG
1 KIT INJECTION
Status: DISCONTINUED | OUTPATIENT
Start: 2023-03-20 | End: 2023-03-21 | Stop reason: HOSPADM

## 2023-03-20 RX ORDER — INSULIN ASPART 100 [IU]/ML
4 INJECTION, SOLUTION INTRAVENOUS; SUBCUTANEOUS
Status: DISCONTINUED | OUTPATIENT
Start: 2023-03-20 | End: 2023-03-21

## 2023-03-20 RX ORDER — CARVEDILOL 25 MG/1
25 TABLET ORAL 2 TIMES DAILY
Status: DISCONTINUED | OUTPATIENT
Start: 2023-03-20 | End: 2023-03-21 | Stop reason: HOSPADM

## 2023-03-20 RX ORDER — SODIUM CHLORIDE 9 MG/ML
INJECTION, SOLUTION INTRAVENOUS ONCE
Status: CANCELLED | OUTPATIENT
Start: 2023-03-20 | End: 2023-03-20

## 2023-03-20 RX ORDER — SODIUM CHLORIDE 0.9 % (FLUSH) 0.9 %
10 SYRINGE (ML) INJECTION EVERY 12 HOURS PRN
Status: DISCONTINUED | OUTPATIENT
Start: 2023-03-20 | End: 2023-03-21 | Stop reason: HOSPADM

## 2023-03-20 RX ORDER — SODIUM CHLORIDE 9 MG/ML
500 INJECTION, SOLUTION INTRAVENOUS
Status: COMPLETED | OUTPATIENT
Start: 2023-03-20 | End: 2023-03-20

## 2023-03-20 RX ORDER — AMLODIPINE BESYLATE 10 MG/1
10 TABLET ORAL DAILY
Status: DISCONTINUED | OUTPATIENT
Start: 2023-03-20 | End: 2023-03-21 | Stop reason: HOSPADM

## 2023-03-20 RX ORDER — ONDANSETRON 2 MG/ML
4 INJECTION INTRAMUSCULAR; INTRAVENOUS
Status: COMPLETED | OUTPATIENT
Start: 2023-03-20 | End: 2023-03-20

## 2023-03-20 RX ORDER — ACETAMINOPHEN 325 MG/1
650 TABLET ORAL EVERY 4 HOURS PRN
Status: DISCONTINUED | OUTPATIENT
Start: 2023-03-20 | End: 2023-03-21 | Stop reason: HOSPADM

## 2023-03-20 RX ORDER — ISOSORBIDE MONONITRATE 60 MG/1
60 TABLET, EXTENDED RELEASE ORAL DAILY
Status: DISCONTINUED | OUTPATIENT
Start: 2023-03-21 | End: 2023-03-21 | Stop reason: HOSPADM

## 2023-03-20 RX ORDER — ONDANSETRON 4 MG/1
4 TABLET, ORALLY DISINTEGRATING ORAL EVERY 6 HOURS PRN
Status: DISCONTINUED | OUTPATIENT
Start: 2023-03-20 | End: 2023-03-20 | Stop reason: ALTCHOICE

## 2023-03-20 RX ORDER — SODIUM CHLORIDE 0.9 % (FLUSH) 0.9 %
10 SYRINGE (ML) INJECTION
Status: DISCONTINUED | OUTPATIENT
Start: 2023-03-20 | End: 2023-03-21 | Stop reason: HOSPADM

## 2023-03-20 RX ORDER — HYDROCODONE BITARTRATE AND ACETAMINOPHEN 500; 5 MG/1; MG/1
TABLET ORAL
Status: DISCONTINUED | OUTPATIENT
Start: 2023-03-20 | End: 2023-03-21 | Stop reason: HOSPADM

## 2023-03-20 RX ORDER — FLUOXETINE HYDROCHLORIDE 20 MG/1
20 CAPSULE ORAL DAILY
Status: DISCONTINUED | OUTPATIENT
Start: 2023-03-21 | End: 2023-03-21 | Stop reason: HOSPADM

## 2023-03-20 RX ORDER — MUPIROCIN 20 MG/G
OINTMENT TOPICAL 2 TIMES DAILY
Status: DISCONTINUED | OUTPATIENT
Start: 2023-03-20 | End: 2023-03-21 | Stop reason: HOSPADM

## 2023-03-20 RX ORDER — MIRTAZAPINE 15 MG/1
15 TABLET, ORALLY DISINTEGRATING ORAL NIGHTLY
Status: DISCONTINUED | OUTPATIENT
Start: 2023-03-20 | End: 2023-03-21 | Stop reason: HOSPADM

## 2023-03-20 RX ORDER — LANOLIN ALCOHOL/MO/W.PET/CERES
1 CREAM (GRAM) TOPICAL DAILY
Status: DISCONTINUED | OUTPATIENT
Start: 2023-03-21 | End: 2023-03-21 | Stop reason: HOSPADM

## 2023-03-20 RX ORDER — FUROSEMIDE 40 MG/1
40 TABLET ORAL 2 TIMES DAILY
Status: DISCONTINUED | OUTPATIENT
Start: 2023-03-20 | End: 2023-03-21

## 2023-03-20 RX ORDER — ALBUTEROL SULFATE 90 UG/1
2 AEROSOL, METERED RESPIRATORY (INHALATION) EVERY 6 HOURS PRN
Status: DISCONTINUED | OUTPATIENT
Start: 2023-03-20 | End: 2023-03-21 | Stop reason: HOSPADM

## 2023-03-20 RX ORDER — SODIUM CHLORIDE 9 MG/ML
INJECTION, SOLUTION INTRAVENOUS
Status: CANCELLED | OUTPATIENT
Start: 2023-03-20

## 2023-03-20 RX ORDER — HYDRALAZINE HYDROCHLORIDE 50 MG/1
100 TABLET, FILM COATED ORAL 2 TIMES DAILY
Status: DISCONTINUED | OUTPATIENT
Start: 2023-03-20 | End: 2023-03-21 | Stop reason: HOSPADM

## 2023-03-20 RX ADMIN — MIRTAZAPINE 15 MG: 15 TABLET, ORALLY DISINTEGRATING ORAL at 09:03

## 2023-03-20 RX ADMIN — CARVEDILOL 25 MG: 25 TABLET, FILM COATED ORAL at 09:03

## 2023-03-20 RX ADMIN — HYDRALAZINE HYDROCHLORIDE 100 MG: 50 TABLET ORAL at 09:03

## 2023-03-20 RX ADMIN — FAMOTIDINE 20 MG: 10 INJECTION, SOLUTION INTRAVENOUS at 11:03

## 2023-03-20 RX ADMIN — MORPHINE SULFATE 4 MG: 4 INJECTION INTRAVENOUS at 11:03

## 2023-03-20 RX ADMIN — FUROSEMIDE 40 MG: 40 TABLET ORAL at 09:03

## 2023-03-20 RX ADMIN — CLONIDINE 1 PATCH: 0.2 PATCH TRANSDERMAL at 07:03

## 2023-03-20 RX ADMIN — MUPIROCIN: 20 OINTMENT TOPICAL at 09:03

## 2023-03-20 RX ADMIN — INSULIN DETEMIR 10 UNITS: 100 INJECTION, SOLUTION SUBCUTANEOUS at 09:03

## 2023-03-20 RX ADMIN — IOHEXOL 100 ML: 350 INJECTION, SOLUTION INTRAVENOUS at 02:03

## 2023-03-20 RX ADMIN — ONDANSETRON 4 MG: 2 INJECTION INTRAMUSCULAR; INTRAVENOUS at 12:03

## 2023-03-20 RX ADMIN — GABAPENTIN 100 MG: 100 CAPSULE ORAL at 09:03

## 2023-03-20 RX ADMIN — AMLODIPINE BESYLATE 10 MG: 10 TABLET ORAL at 05:03

## 2023-03-20 RX ADMIN — SODIUM CHLORIDE 500 ML: 9 INJECTION, SOLUTION INTRAVENOUS at 02:03

## 2023-03-20 RX ADMIN — INSULIN ASPART 4 UNITS: 100 INJECTION, SOLUTION INTRAVENOUS; SUBCUTANEOUS at 05:03

## 2023-03-20 NOTE — HPI
Tabby Howard is a 33 y.o. female with a past medical history including ESRD on HD (T/Th/Sat), DVT, DM, and gastroparesis who presented to the ED after her ophthalmic surgery was canceled today due to low Hg/Hct. Hg of 5.6 on presentation to ED; received 1u of pRBC's. Last dialyzed on Saturday 3/18/23, but only received a partial treatment. Anuric at baseline. Endorses abdominal pain, nausea, and back pain. Denies CP, fever, cough, emesis, diarrhea, and swelling to her lower extremities. Nephrology was consulted for management of ESRD/HD.

## 2023-03-20 NOTE — H&P
[facility] - Emergency Dept  [facility] Medicine  History & Physical    Patient Name: Tabby Howard  MRN: [id number]  Patient Class: OP- Observation  Admission Date: 3/20/2023  Attending Physician: Leah Munson MD   Primary Care Provider: AIDEN CONNER NP         Patient information was obtained from patient, past medical records and ER records.     Subjective:     Principal Problem:Anemia in ESRD (end-stage renal disease)    Chief Complaint:   Chief Complaint   Patient presents with    Abnormal Lab     Supposed to have retinal detachment repaired at [facility]. CBG was 340, [facility] administered her 12 units of IV insulin. They also report her Hbg/hct low.         HPI: Tabby Howard is a 33-year-old lady with prior history of ESRD on hemodialysis(T-Th-S),  gastroparesis, seizure, DM admitted with anemia. She is frequently hospitalized - this is her 7th admission for 2023. She was planning for retinal detachment repair with ophthalmology today when her blood glucose was found to be in the 300s + Hgb 5.6 (from baseline 8 last week). She has a known history of ESRD-associated anemia. Has no black/bloody stools, bloody emesis, trauma. She does take anticoagulation ppx for DVT. Endorses nausea, vomiting, pain - these are chronic symptoms. Initially when I asked she says these are brand new symptoms. However, in review of records she has had numerous hospitalizations in which she complains of identical pain and requests opioids. She has had numerous normal CT scans of her abdomen recently, and no organic etiology has been identified. EGD done 12/2022 normal. GI at that time felt that diagnosis was likely gastroparesis, and encouraged dc opioids. Concern for drug seeking behavior     On arrival to the ED, she was afebrile and HDS. Labs with Hgb 5.6, plt 89. In review of records, recent baseline Hgb 7.5-8, has recent history of thrombocytopenia. Diff without schistocytes. LFTs with bili at baseline 2. LA 2.6,  repeat pend. BHB reassuring. Lipase normal. CT A/P with unchanged volume overload but otherwise no acute pathology identified. EKG normal sinus rhythm. She was admitted to the hospital for further management of her anemia          Past Medical History:   Diagnosis Date    CKD (chronic kidney disease), stage IV 2022    Diabetes mellitus due to underlying condition with unspecified complications 2022    Gastroparesis 2022    Heart failure with preserved ejection fraction 2022    EF 55% on 3/22    History of gastroesophageal reflux (GERD)     History of supraventricular tachycardia     Hyperkalemia 2022    Hypertensive emergency 2022    Sickle cell trait 2022    Type 2 diabetes mellitus        Past Surgical History:   Procedure Laterality Date     SECTION      x 3    COLONOSCOPY      COLONOSCOPY N/A 2022    Procedure: COLONOSCOPY;  Surgeon: Jagdeep Cedeno MD;  Location: [facility];  Service: Endoscopy;  Laterality: N/A;    ESOPHAGOGASTRODUODENOSCOPY N/A 10/18/2019    Procedure: ESOPHAGOGASTRODUODENOSCOPY (EGD);  Surgeon: Gianluca Mendez MD;  Location: [facility];  Service: Endoscopy;  Laterality: N/A;    ESOPHAGOGASTRODUODENOSCOPY N/A 2022    Procedure: EGD (ESOPHAGOGASTRODUODENOSCOPY);  Surgeon: Micky Paredes III, MD;  Location: [facility];  Service: Endoscopy;  Laterality: N/A;    ESOPHAGOGASTRODUODENOSCOPY N/A 2022    Procedure: EGD (ESOPHAGOGASTRODUODENOSCOPY);  Surgeon: Marcelo Zhong MD;  Location: [facility];  Service: Endoscopy;  Laterality: N/A;    LAPAROSCOPIC CHOLECYSTECTOMY N/A 2022    Procedure: CHOLECYSTECTOMY, LAPAROSCOPIC;  Surgeon: Grey Perez MD;  Location: [facility];  Service: General;  Laterality: N/A;    PLACEMENT OF DUAL-LUMEN VASCULAR CATHETER Left 2022    Procedure: INSERTION-CATHETER-JOSEPH;  Surgeon: Dionte Gan MD;  Location: [facility] OR;  Service: General;  Laterality: Left;    PLACEMENT OF  DUAL-LUMEN VASCULAR CATHETER Right 07/26/2022    Procedure: INSERTION-CATHETER-Hemosplit;  Surgeon: Dionte Gan MD;  Location: [facility];  Service: General;  Laterality: Right;       Review of patient's allergies indicates:   Allergen Reactions    Penicillins Hives       Current Facility-Administered Medications on File Prior to Encounter   Medication    [COMPLETED] insulin regular injection 6 Units 0.06 mL    [COMPLETED] insulin regular injection 8 Units 0.08 mL    [DISCONTINUED] chondroitin-sodium hyaluronate (VISCOAT) 4-3 % (40-30 mg/mL) intra-ocular injection    [DISCONTINUED] cyclopentolate 1% ophthalmic solution 1 drop    [DISCONTINUED] cyclopentolate 1% ophthalmic solution 1 drop    [DISCONTINUED] dexAMETHasone (DECADRON) 4 mg/mL injection    [DISCONTINUED] EPINEPHrine (PF) (ADRENALIN) 1 mg/mL (1 mL) injection    [DISCONTINUED] LIDOcaine (PF) 20 mg/mL (2%) 20 mg/mL (2 %) injection    [DISCONTINUED] moxifloxacin 0.5 % ophthalmic solution 1 drop    [DISCONTINUED] moxifloxacin 0.5 % ophthalmic solution 1 drop    [DISCONTINUED] neomycin-polymyxin-dexamethasone (DEXACINE) 3.5 mg/g-10,000 unit/g-0.1 % ophthalmic ointment    [DISCONTINUED] phenylephrine HCL 2.5% ophthalmic solution 1 drop    [DISCONTINUED] phenylephrine HCL 2.5% ophthalmic solution 1 drop    [DISCONTINUED] prednisoLONE acetate 1 % ophthalmic suspension 1 drop    [DISCONTINUED] prednisoLONE acetate 1 % ophthalmic suspension 1 drop    [DISCONTINUED] TETRAcaine HCl (PF) 0.5 % Drop 1 drop    [DISCONTINUED] TETRAcaine HCl (PF) 0.5 % Drop 1 drop    [DISCONTINUED] vancomycin (VANCOCIN) 500 mg injection     Current Outpatient Medications on File Prior to Encounter   Medication Sig    albuterol (PROVENTIL/VENTOLIN HFA) 90 mcg/actuation inhaler Inhale 2 puffs into the lungs every 6 (six) hours as needed for Wheezing. Rescue    amLODIPine (NORVASC) 10 MG tablet Take 1 tablet every day by oral route for 30 days. (Patient taking  differently: Take 10 mg by mouth once daily.)    apixaban (ELIQUIS) 5 mg Tab Take 1 tablet (5 mg total) by mouth 2 (two) times daily.    BINAXNOW COVID-19 AG SELF TEST Kit Take 1 Dose by mouth once.    carvediloL (COREG) 25 MG tablet Take 1 tablet twice a day by oral route for 30 days. (Patient taking differently: Take 25 mg by mouth 2 (two) times daily.)    cloNIDine 0.2 mg/24 hr td ptwk (CATAPRES) 0.2 mg/24 hr Apply 1 patch to skin once every week.    ferrous sulfate 325 (65 FE) MG EC tablet Take 325 mg by mouth once daily.    FLUoxetine 20 MG capsule Take 1 capsule every day by oral route for 30 days. (Patient taking differently: Take 20 mg by mouth once daily.)    furosemide (LASIX) 40 MG tablet Take 40 mg by mouth 2 (two) times a day.    gabapentin (NEURONTIN) 100 MG capsule Take 1 capsule by mouth 3 times daily (Patient taking differently: Take 100 mg by mouth 3 (three) times daily.)    hydrALAZINE (APRESOLINE) 100 MG tablet Take 1 tablet twice a day by oral route for 30 days. (Patient taking differently: Take 100 mg by mouth 2 (two) times daily.)    HYDROmorphone (DILAUDID) 4 MG tablet Take 4 mg by mouth every 12 (twelve) hours as needed for Pain.    insulin aspart U-100 (NOVOLOG) 100 unit/mL injection Inject 8 Units into the skin 3 (three) times daily before meals.    insulin detemir U-100 (LEVEMIR) 100 unit/mL injection Inject 10 Units into the skin every evening.    isosorbide mononitrate (IMDUR) 60 MG 24 hr tablet Take 1 tablet every day by oral route for 30 days. (Patient taking differently: Take 60 mg by mouth once daily.)    levETIRAcetam (KEPPRA) 500 MG Tab Take 1 tablet twice a day by oral route for 30 days. (Patient taking differently: Take 500 mg by mouth once daily.)    mirtazapine (REMERON SOL-TAB) 15 MG disintegrating tablet Take 1 tablet (15 mg total) by mouth nightly.    ondansetron (ZOFRAN-ODT) 4 MG TbDL Place1 tablet on the tongue every 8 hours as needed (Patient taking  differently: Take 4 mg by mouth every 6 (six) hours as needed.)    pantoprazole (PROTONIX) 40 MG tablet Take 1 tablet every day by oral route for 30 days. (Patient taking differently: Take 40 mg by mouth once daily.)    [DISCONTINUED] atenoloL (TENORMIN) 50 MG tablet Take 1 tablet (50 mg total) by mouth every other day.    [DISCONTINUED] omeprazole (PRILOSEC) 20 MG capsule Take 2 capsules (40 mg total) by mouth once daily. for 10 days     Family History       Problem Relation (Age of Onset)    Diabetes Mother, Father          Tobacco Use    Smoking status: Never    Smokeless tobacco: Never   Substance and Sexual Activity    Alcohol use: Not Currently    Drug use: No    Sexual activity: Not Currently     Partners: Male     Birth control/protection: I.U.D.     Review of Systems  Objective:     Vital Signs (Most Recent):  Temp: 97.6 °F (36.4 °C) (03/20/23 1440)  Pulse: 82 (03/20/23 1440)  Resp: 18 (03/20/23 1440)  BP: (!) 161/91 (03/20/23 1440)  SpO2: 98 % (03/20/23 1440)   Vital Signs (24h Range):  Temp:  [97 °F (36.1 °C)-98.4 °F (36.9 °C)] 97.6 °F (36.4 °C)  Pulse:  [79-87] 82  Resp:  [16-20] 18  SpO2:  [96 %-100 %] 98 %  BP: (145-183)/() 161/91     Weight: 59.9 kg (132 lb)  Body mass index is 24.14 kg/m².    Physical Exam  Vitals and nursing note reviewed.   Constitutional:       Appearance: She is not toxic-appearing.      Comments: Writing in bed on arrival but will stop, adjust her position, answer questions easily, then return to writing    HENT:      Head: Normocephalic and atraumatic.   Eyes:      Extraocular Movements: Extraocular movements intact.      Conjunctiva/sclera: Conjunctivae normal.   Cardiovascular:      Rate and Rhythm: Normal rate and regular rhythm.   Pulmonary:      Effort: Pulmonary effort is normal. No respiratory distress.      Breath sounds: Normal breath sounds.   Abdominal:      General: Bowel sounds are normal. There is no distension.      Palpations: Abdomen is soft.       Tenderness: There is abdominal tenderness (generlized tenderness without rebound or guarding).   Musculoskeletal:      Cervical back: Normal range of motion and neck supple.      Right lower leg: No edema.      Left lower leg: No edema.   Skin:     General: Skin is warm and dry.      Capillary Refill: Capillary refill takes less than 2 seconds.   Neurological:      Mental Status: She is alert and oriented to person, place, and time.   Psychiatric:      Comments: Intermittently writing and crying, with significant worsening after we discussed avoiding pain medications            Significant Labs: All pertinent labs within the past 24 hours have been reviewed.    Significant Imaging: I have reviewed all pertinent imaging results/findings within the past 24 hours.    Assessment/Plan:     * Anemia in ESRD (end-stage renal disease)  She has a known history of ESRD-associated anemia. Labs with Hgb 5.6, plt 89. In review of records, recent baseline Hgb 7.5-8, has recent history of thrombocytopenia. Automatic diff without schistocytes, pathology interpretation pending. LFTs with bili at baseline 2. Haptoglobin undetectable at baseline. LDH pending. She does take anticoagulation ppx for DVT, but has no black/bloody stools, bloody emesis, trauma. Acute blood loss a less likely etiology. Considering possibility of hemolysis - haptoglobin appears to be chronically undetectable. LDH pending. She has no clear medication etiologies, no evident source of infection as precipitant. Blood cultures pend. Considered possibility of ESRD production defect, but notably retic was elevated (inadequate, but still elevated). Will monitor CBC closely and provide supportive care pending further evaluation     - follow up INR, LDH  - consider hematology consult in the AM  - transfuse for Hgb <7  - monitor for s/s bleeding  - holding eliquis pending further stability of hemoglobin     Congestive heart failure with left ventricular diastolic  dysfunction  Patient is identified as having Diastolic (HFpEF) heart failure that is Chronic. CHF is currently controlled. Latest ECHO performed and demonstrates- Results for orders placed during the hospital encounter of 01/12/23    Echo    Interpretation Summary  · The left ventricle is normal in size with moderate concentric hypertrophy and normal systolic function.  · The estimated ejection fraction is 55%.  · Grade III left ventricular diastolic dysfunction.  · Normal right ventricular size with normal right ventricular systolic function.  · Moderate left atrial enlargement.  · Moderate to severe tricuspid regurgitation.  · Mild to moderate pulmonic regurgitation.  · Mild mitral regurgitation.  · Normal central venous pressure (3 mmHg).  · The estimated PA systolic pressure is 56 mmHg.  · There is pulmonary hypertension.  · Moderate to large circumferential pericardial effusion. No evidence of tamponade.  . Continue Furosemide and monitor clinical status closely. Monitor on telemetry. Patient is off CHF pathway.  Monitor strict Is&Os and daily weights.  Place on fluid restriction of 1.5 L. Continue to stress to patient importance of self efficacy and  on diet for CHF. Last BNP reviewed- and noted below No results for input(s): BNP, BNPTRIAGEBLO in the last 168 hours..    iHD for volume management       DM (diabetes mellitus)  Patient's FSGs are controlled on current medication regimen.  Last A1c reviewed-   Lab Results   Component Value Date    HGBA1C 5.8 03/03/2023     Most recent fingerstick glucose reviewed-   Recent Labs   Lab 03/20/23  0912 03/20/23  1027 03/20/23  1114   POCTGLUCOSE 346* 342* 216*     Current correctional scale  Low  Maintain anti-hyperglycemic dose as follows-   Antihyperglycemics (From admission, onward)    Start     Stop Route Frequency Ordered    03/20/23 2100  insulin detemir U-100 pen 10 Units         -- SubQ Nightly 03/20/23 1603    03/20/23 1715  insulin aspart U-100  injection 4 Units         -- SubQ 3 times daily before meals 03/20/23 1603    03/20/23 1703  insulin aspart U-100 pen 0-5 Units         -- SubQ Before meals & nightly PRN 03/20/23 1603        Hold Oral hypoglycemics while patient is in the hospital.    Drug-seeking behavior  Endorses nausea, vomiting, pain - these are chronic symptoms. Initially when I asked she says these are brand new symptoms. However, in review of records she has had numerous hospitalizations in which she complains of identical pain and requests opioids. She has had numerous normal CT scans of her abdomen recently, and no organic etiology has been identified. EGD done 12/2022 normal. GI at that time felt that diagnosis was likely gastroparesis, and encouraged dc opioids. Concern for drug seeking behavior    Thrombocytopenia  Plt 89 on admission  In review of recent history, this plt count has been typical for her in the recent months      Uncontrolled hypertension  Continue home medications, reviewed on admission      Deep vein thrombosis (DVT) of non-extremity vein  Holding home eliquis pending Hgb stability  No s/s active bleeding as above      ESRD (end stage renal disease) on dialysis  Nephrology consulted on admission  iHD TTS      Seizure disorder  Continue home keppra        VTE Risk Mitigation (From admission, onward)         Ordered     IP VTE HIGH RISK PATIENT  Once         03/20/23 1603     Place sequential compression device  Until discontinued         03/20/23 1603     Place sequential compression device  Until discontinued         03/20/23 1603     Reason for No Pharmacological VTE Prophylaxis  Once        Question:  Reasons:  Answer:  Risk of Bleeding    03/20/23 1603                   On 03/20/2023, patient should be placed in hospital observation services under my care.        Leah Munson MD  Department of Hospital Medicine  [facility] - Emergency Dept

## 2023-03-20 NOTE — PLAN OF CARE
[facility] - Emergency Dept  Initial Discharge Assessment       Primary Care Provider: AIDEN CONNER NP    Admission Diagnosis: No admission diagnoses are documented for this encounter.    Admission Date: 3/20/2023  Expected Discharge Date:     Pt lives in a main level apartment with no steps to enter, with her parents and dependent children.      Pt uses a walker to assist with ambulation and when required her family helps with ADL's.      Pt to d/c home with no needs when ready    Discharge Barriers Identified: (P) None    Payor: MEDICAID / Plan: [place] / Product Type: Managed Medicaid /     Extended Emergency Contact Information  Primary Emergency Contact: Garcia Howard  Mobile Phone: [phone]  Relation: Father  Preferred language: English   needed? No  Secondary Emergency Contact: Tanya Howard  Mobile Phone: [phone]  Relation: Step parent  Preferred language: English   needed? No    Discharge Plan A: (P) Home  Discharge Plan B: (P) Home      [facility]  [address]  [place] [address]  Phone: [phone] Fax: [phone]      Initial Assessment (most recent)       Adult Discharge Assessment - 03/20/23 1253          Discharge Assessment    Assessment Type Discharge Planning Assessment (P)      Confirmed/corrected address, phone number and insurance Yes (P)      Confirmed Demographics Correct on Facesheet (P)      Source of Information patient (P)      People in Home child(tammy), dependent;parent(s) (P)      Facility Arrived From: clinic - eye surgery (P)      Do you expect to return to your current living situation? Yes (P)      Do you have help at home or someone to help you manage your care at home? No (P)      Prior to hospitilization cognitive status: Alert/Oriented;No Deficits (P)      Current cognitive status: Alert/Oriented;No Deficits (P)      Walking or Climbing Stairs ambulation difficulty, requires equipment (P)    Pt is a 82 yo M who was admitted for NSTEMI with one stent placed. Pt is AAOx4, and independent. Pt is calm and cooperative. Fall precautions in place -- SR x2, non-slip socks on, call light in reach, bed in lowest position. Free of falls, traumas, and injury. Pt had LHC on 08/06/2020 for NSTEMI and had a R Radial Art Line site with hematoma formation, has since resolved. Skin otherwise intact. Pt educated on S/S to notify healthcare team about. Pt demonstrates and verbalizes understanding. VSS on RA. Pt is being monitored in Lead II and V1 and is in NSR. Pt denies pain. Plan of care reviewed with pt. Questions encouraged, and answered.         Mobility Management walker with 2 wheels (P)      Home Accessibility wheelchair accessible (P)      Home Layout Able to live on 1st floor (P)      Equipment Currently Used at Home walker, standard (P)      Do you currently have service(s) that help you manage your care at home? No (P)      Do you have any problems affording any of your prescribed medications? No (P)      Is the patient taking medications as prescribed? yes (P)      Who is going to help you get home at discharge? family (P)      How do you get to doctors appointments? family or friend will provide;car, drives self (P)      Are you on dialysis? Yes (P)      Dialysis Name and Scheduled days [facility] [place] T, Th Sat at 1100hrs (P)      Do you take coumadin? No (P)      Discharge Plan A Home (P)      Discharge Plan B Home (P)      DME Needed Upon Discharge  none (P)      Discharge Plan discussed with: Patient (P)      Discharge Barriers Identified None (P)         OTHER    Name(s) of People in Home parents Garcia and Tanya and dependent children (P)                       Angelina Godwin CD, MSW, LMSW, RSW   Case Management  [facility]  Email: [email or web address]

## 2023-03-20 NOTE — PHARMACY MED REC
"  Admission Medication History     The home medication history was taken by Jersey Rojas.    You may go to "Admission" then "Reconcile Home Medications" tabs to review and/or act upon these items.     The home medication list has been updated by the Pharmacy department.   Please read ALL comments highlighted in yellow.   Please address this information as you see fit.    Feel free to contact us if you have any questions or require assistance.      The medications listed below were removed from the home medication list. Please reorder if appropriate:  Patient reports no longer taking the following medication(s):  FERROUS SULFATE 325 MG   FUROSEMIDE 40 MG      Medications listed below were obtained from: Patient/family  Current Outpatient Medications on File Prior to Encounter   Medication Sig    albuterol (PROVENTIL/VENTOLIN HFA) 90 mcg/actuation inhaler Inhale 2 puffs into the lungs every 6 (six) hours as needed for Wheezing. Rescue      amLODIPine (NORVASC) 10 MG tablet   Take 10 mg by mouth once daily.      apixaban (ELIQUIS) 5 mg Tab Take 1 tablet (5 mg total) by mouth 2 (two) times daily.      carvediloL (COREG) 25 MG tablet Take 25 mg by mouth 2 (two) times daily.      cloNIDine 0.2 mg/24 hr td ptwk (CATAPRES) 0.2 mg/24 hr   Apply 1 patch to skin once every week.    FLUoxetine 20 MG capsule Take 20 mg by mouth once daily.      gabapentin (NEURONTIN) 100 MG capsule Take 100 mg by mouth 3 (three) times daily.      hydrALAZINE (APRESOLINE) 100 MG tablet Take 100 mg by mouth 2 (two) times daily.      insulin aspart U-100 (NOVOLOG) 100 unit/mL injection   Inject 6-8 Units into the skin 3 (three) times daily before meals.    insulin detemir U-100 (LEVEMIR) 100 unit/mL injection   Inject 18 Units into the skin every evening.    isosorbide mononitrate (IMDUR) 60 MG 24 hr tablet   Take 60 mg by mouth once daily.    levETIRAcetam (KEPPRA) 500 MG Tab   Take 500 mg by mouth once daily.    mirtazapine (REMERON SOL-TAB) 15 MG "     disintegrating tablet Take 1 tablet (15 mg total) by mouth nightly.    ondansetron (ZOFRAN-ODT) 4 MG TbDL Take 4 mg by mouth every 6 (six) hours as needed.      pantoprazole (PROTONIX) 40 MG tablet   Take 40 mg by mouth once daily.      BINAXNOW COVID-19 AG SELF TEST Kit   Take 1 Dose by mouth once.     Potential issues to be addressed PRIOR TO DISCHARGE  Patient reported not taking the following medications: (DILAUDID). These medications remain on the home medication list. Please address accordingly.           Jersey Rojas  EXT [id number]                 .

## 2023-03-20 NOTE — ASSESSMENT & PLAN NOTE
Endorses nausea, vomiting, pain - these are chronic symptoms. Initially when I asked she says these are brand new symptoms. However, in review of records she has had numerous hospitalizations in which she complains of identical pain and requests opioids. She has had numerous normal CT scans of her abdomen recently, and no organic etiology has been identified. EGD done 12/2022 normal. GI at that time felt that diagnosis was likely gastroparesis, and encouraged dc opioids. Concern for drug seeking behavior

## 2023-03-20 NOTE — ASSESSMENT & PLAN NOTE
Outpatient HD Information:    -Outpatient HD unit: [facility]   -HD tx days: T/Th/Sat  -HD tx time: 4hrs   -Last HD tx prior to presentation: 3/18/23  -HD access: R IJ TDC   -HD modality: iHD   -Residual urine: Anuric       Plan/Recommendations:    -No urgent indication for HD today. Plan on HD tomorrow, per patient's T/Th/Sat HD schedule   -Renal diet  -Strict I/O's and daily weights  -Daily renal function panels   -Renally dose meds

## 2023-03-20 NOTE — HPI
Tabby Howard is a 33-year-old lady with prior history of ESRD on hemodialysis(T-Th-S),  gastroparesis, seizure, DM admitted with anemia. She is frequently hospitalized - this is her 7th admission for 2023. She was planning for retinal detachment repair with ophthalmology today when her blood glucose was found to be in the 300s + Hgb 5.6 (from baseline 8 last week). She has a known history of ESRD-associated anemia. Has no black/bloody stools, bloody emesis, trauma. She does take anticoagulation ppx for DVT. Endorses nausea, vomiting, pain - these are chronic symptoms. Initially when I asked she says these are brand new symptoms. However, in review of records she has had numerous hospitalizations in which she complains of identical pain and requests opioids. She has had numerous normal CT scans of her abdomen recently, and no organic etiology has been identified. EGD done 12/2022 normal. GI at that time felt that diagnosis was likely gastroparesis, and encouraged dc opioids. Concern for drug seeking behavior     On arrival to the ED, she was afebrile and HDS. Labs with Hgb 5.6, plt 89. In review of records, recent baseline Hgb 7.5-8, has recent history of thrombocytopenia. Diff without schistocytes. LFTs with bili at baseline 2. LA 2.6, repeat pend. BHB reassuring. Lipase normal. CT A/P with unchanged volume overload but otherwise no acute pathology identified. EKG normal sinus rhythm. She was admitted to the hospital for further management of her anemia

## 2023-03-20 NOTE — ASSESSMENT & PLAN NOTE
-Target Hg of 10-12  -Transfuse for Hg <7.0  -Hg of 5.6 on presentation. Anemia severe enough that is possible 2/2 additional causes other than anemia in ESRD. Will need additional work-up to rule out active bleed.   -Will need 2u of pRBC's or Hg >7.0 prior to HD tomorrow

## 2023-03-20 NOTE — CONSULTS
[facility] - Emergency Dept  Nephrology  Consult Note    Patient Name: Tabby Howard  MRN: [id number]  Admission Date: 3/20/2023  Hospital Length of Stay: 0 days  Attending Provider: Leah Munson MD   Primary Care Physician: AIDEN CONNER NP  Principal Problem:<principal problem not specified>    Inpatient consult to Nephrology  Consult performed by: Henri Montoya NP  Consult ordered by: Leah Munson MD  Reason for consult: ESRD on HD         Subjective:     HPI: Tabby Howard is a 33 y.o. female with a past medical history including ESRD on HD (//Sat), DVT, DM, and gastroparesis who presented to the ED after her ophthalmic surgery was canceled today due to low Hg/Hct. Hg of 5.6 on presentation to ED; received 1u of pRBC's. Last dialyzed on Saturday 3/18/23, but only received a partial treatment. Anuric at baseline. Endorses abdominal pain, nausea, and back pain. Denies CP, fever, cough, emesis, diarrhea, and swelling to her lower extremities. Nephrology was consulted for management of ESRD/HD.       Past Medical History:   Diagnosis Date    CKD (chronic kidney disease), stage IV 2022    Diabetes mellitus due to underlying condition with unspecified complications 2022    Gastroparesis 2022    Heart failure with preserved ejection fraction 2022    EF 55% on 3/22    History of gastroesophageal reflux (GERD)     History of supraventricular tachycardia     Hyperkalemia 2022    Hypertensive emergency 2022    Sickle cell trait 2022    Type 2 diabetes mellitus        Past Surgical History:   Procedure Laterality Date     SECTION      x 3    COLONOSCOPY      COLONOSCOPY N/A 2022    Procedure: COLONOSCOPY;  Surgeon: Jagdeep Cedeno MD;  Location: [facility];  Service: Endoscopy;  Laterality: N/A;    ESOPHAGOGASTRODUODENOSCOPY N/A 10/18/2019    Procedure: ESOPHAGOGASTRODUODENOSCOPY (EGD);  Surgeon: Gianluca Mendez MD;  Location: [facility];   Service: Endoscopy;  Laterality: N/A;    ESOPHAGOGASTRODUODENOSCOPY N/A 08/24/2022    Procedure: EGD (ESOPHAGOGASTRODUODENOSCOPY);  Surgeon: Micky Paredes III, MD;  Location: [facility] ENDO;  Service: Endoscopy;  Laterality: N/A;    ESOPHAGOGASTRODUODENOSCOPY N/A 12/5/2022    Procedure: EGD (ESOPHAGOGASTRODUODENOSCOPY);  Surgeon: Marcelo Zhong MD;  Location: [facility] ENDO;  Service: Endoscopy;  Laterality: N/A;    LAPAROSCOPIC CHOLECYSTECTOMY N/A 07/30/2022    Procedure: CHOLECYSTECTOMY, LAPAROSCOPIC;  Surgeon: Grey Perez MD;  Location: [facility] OR;  Service: General;  Laterality: N/A;    PLACEMENT OF DUAL-LUMEN VASCULAR CATHETER Left 07/12/2022    Procedure: INSERTION-CATHETER-JOSEPH;  Surgeon: Dionte Gan MD;  Location: [facility] OR;  Service: General;  Laterality: Left;    PLACEMENT OF DUAL-LUMEN VASCULAR CATHETER Right 07/26/2022    Procedure: INSERTION-CATHETER-Hemosplit;  Surgeon: Dionte Gan MD;  Location: [facility] OR;  Service: General;  Laterality: Right;       Review of patient's allergies indicates:   Allergen Reactions    Penicillins Hives     Current Facility-Administered Medications   Medication Frequency    0.9%  NaCl infusion (for blood administration) Q24H PRN     Current Outpatient Medications   Medication    albuterol (PROVENTIL/VENTOLIN HFA) 90 mcg/actuation inhaler    amLODIPine (NORVASC) 10 MG tablet    apixaban (ELIQUIS) 5 mg Tab    BINAXNOW COVID-19 AG SELF TEST Kit    carvediloL (COREG) 25 MG tablet    cloNIDine 0.2 mg/24 hr td ptwk (CATAPRES) 0.2 mg/24 hr    ferrous sulfate 325 (65 FE) MG EC tablet    FLUoxetine 20 MG capsule    furosemide (LASIX) 40 MG tablet    gabapentin (NEURONTIN) 100 MG capsule    hydrALAZINE (APRESOLINE) 100 MG tablet    HYDROmorphone (DILAUDID) 4 MG tablet    insulin aspart U-100 (NOVOLOG) 100 unit/mL injection    insulin detemir U-100 (LEVEMIR) 100 unit/mL injection    isosorbide mononitrate (IMDUR) 60 MG 24 hr tablet    levETIRAcetam  (KEPPRA) 500 MG Tab    mirtazapine (REMERON SOL-TAB) 15 MG disintegrating tablet    ondansetron (ZOFRAN-ODT) 4 MG TbDL    pantoprazole (PROTONIX) 40 MG tablet     Family History       Problem Relation (Age of Onset)    Diabetes Mother, Father          Tobacco Use    Smoking status: Never    Smokeless tobacco: Never   Substance and Sexual Activity    Alcohol use: Not Currently    Drug use: No    Sexual activity: Not Currently     Partners: Male     Birth control/protection: I.U.D.     Review of Systems   Constitutional: Negative.    HENT: Negative.     Eyes: Negative.    Respiratory: Negative.     Cardiovascular: Negative.    Gastrointestinal:  Positive for abdominal pain and nausea.   Genitourinary:  Positive for decreased urine volume (anuric at baseline).   Musculoskeletal:  Positive for back pain.   Skin: Negative.    Neurological: Negative.    Psychiatric/Behavioral: Negative.     Objective:     Vital Signs (Most Recent):  Temp: 97.6 °F (36.4 °C) (03/20/23 1440)  Pulse: 82 (03/20/23 1440)  Resp: 18 (03/20/23 1440)  BP: (!) 161/91 (03/20/23 1440)  SpO2: 98 % (03/20/23 1440)   Vital Signs (24h Range):  Temp:  [97 °F (36.1 °C)-98.4 °F (36.9 °C)] 97.6 °F (36.4 °C)  Pulse:  [79-87] 82  Resp:  [16-20] 18  SpO2:  [96 %-100 %] 98 %  BP: (145-183)/() 161/91     Weight: 59.9 kg (132 lb) (03/20/23 1113)  Body mass index is 24.14 kg/m².  Body surface area is 1.62 meters squared.    No intake/output data recorded.    Physical Exam  Constitutional:       Appearance: Normal appearance.   HENT:      Head: Normocephalic and atraumatic.      Nose: Nose normal.      Mouth/Throat:      Mouth: Mucous membranes are moist.      Pharynx: Oropharynx is clear.   Eyes:      Conjunctiva/sclera: Conjunctivae normal.   Cardiovascular:      Rate and Rhythm: Normal rate and regular rhythm.      Comments: R IJ TDC   Pulmonary:      Effort: Pulmonary effort is normal.      Breath sounds: Normal breath sounds.   Abdominal:       General: Abdomen is flat.   Musculoskeletal:         General: Normal range of motion.      Cervical back: Normal range of motion and neck supple.   Skin:     General: Skin is warm and dry.   Neurological:      General: No focal deficit present.      Mental Status: She is alert and oriented to person, place, and time.   Psychiatric:         Mood and Affect: Mood normal.         Behavior: Behavior normal.       Significant Labs:  CBC:   Recent Labs   Lab 03/20/23  1136 03/20/23  1202   WBC 4.52  --    RBC 1.87*  --    HGB 5.6*  --    HCT 17.0* 17*   PLT 89*  --    MCV 91  --    MCH 29.9  --    MCHC 32.9  --      CMP:   Recent Labs   Lab 03/20/23  1136   *   CALCIUM 8.9   ALBUMIN 3.0*   PROT 6.7      K 3.7   CO2 24      BUN 54*   CREATININE 6.0*   ALKPHOS 398*   ALT 37   AST 31   BILITOT 2.6*     All labs within the past 24 hours have been reviewed.      Assessment/Plan:     Renal/  ESRD (end stage renal disease) on dialysis  Outpatient HD Information:    -Out[facility] HD [facility]: [facility]   -HD tx days: T/Th/Sat  -HD tx time: 4hrs   -Last HD tx prior to presentation: 3/18/23  -HD access: R IJ TDC   -HD modality: iHD   -Residual urine: Anuric       Plan/Recommendations:    -No urgent indication for HD today. Plan on HD tomorrow, per patient's T/Th/Sat HD schedule   -Renal diet  -Strict I/O's and daily weights  -Daily renal function panels   -Renally dose meds      Chronic kidney disease-mineral and bone disorder  -Low phos diet   -Start renvela 800mg tid with meals once patient is able to tolerate a diet     Oncology  Anemia in ESRD (end-stage renal disease)  -Target Hg of 10-12  -Transfuse for Hg <7.0  -Hg of 5.6 on presentation. Anemia severe enough that is possible 2/2 additional causes other than anemia in ESRD. Will need additional work-up to rule out active bleed.   -Will need 2u of pRBC's or Hg >7.0 prior to HD tomorrow         Thank you for your consult. I will follow-up with patient.  Please contact us if you have any additional questions.    Henri Montoya NP  Nephrology  [facility] - Emergency Dept

## 2023-03-20 NOTE — ASSESSMENT & PLAN NOTE
Plt 89 on admission  In review of recent history, this plt count has been typical for her in the recent months

## 2023-03-20 NOTE — ASSESSMENT & PLAN NOTE
Patient's FSGs are controlled on current medication regimen.  Last A1c reviewed-   Lab Results   Component Value Date    HGBA1C 5.8 03/03/2023     Most recent fingerstick glucose reviewed-   Recent Labs   Lab 03/20/23  0912 03/20/23  1027 03/20/23  1114   POCTGLUCOSE 346* 342* 216*     Current correctional scale  Low  Maintain anti-hyperglycemic dose as follows-   Antihyperglycemics (From admission, onward)    Start     Stop Route Frequency Ordered    03/20/23 2100  insulin detemir U-100 pen 10 Units         -- SubQ Nightly 03/20/23 1603    03/20/23 1715  insulin aspart U-100 injection 4 Units         -- SubQ 3 times daily before meals 03/20/23 1603    03/20/23 1703  insulin aspart U-100 pen 0-5 Units         -- SubQ Before meals & nightly PRN 03/20/23 1603        Hold Oral hypoglycemics while patient is in the hospital.

## 2023-03-20 NOTE — PLAN OF CARE
03/20/23 1257   Post-Acute Status   Post-Acute Authorization Other   Other Status No Post-Acute Service Needs   Discharge Delays None known at this time   Discharge Plan   Discharge Plan A Home     No post acute services required at this time      Angelina Godwin CD, MSW, LMSW, RSW   Case Management  [facility]  Email: [email or web address]

## 2023-03-20 NOTE — SUBJECTIVE & OBJECTIVE
Past Medical History:   Diagnosis Date    CKD (chronic kidney disease), stage IV 2022    Diabetes mellitus due to underlying condition with unspecified complications 2022    Gastroparesis 2022    Heart failure with preserved ejection fraction 2022    EF 55% on 3/22    History of gastroesophageal reflux (GERD)     History of supraventricular tachycardia     Hyperkalemia 2022    Hypertensive emergency 2022    Sickle cell trait 2022    Type 2 diabetes mellitus        Past Surgical History:   Procedure Laterality Date     SECTION      x 3    COLONOSCOPY      COLONOSCOPY N/A 2022    Procedure: COLONOSCOPY;  Surgeon: Jagdeep Cedeno MD;  Location: [facility];  Service: Endoscopy;  Laterality: N/A;    ESOPHAGOGASTRODUODENOSCOPY N/A 10/18/2019    Procedure: ESOPHAGOGASTRODUODENOSCOPY (EGD);  Surgeon: Gianluca Mendez MD;  Location: [facility];  Service: Endoscopy;  Laterality: N/A;    ESOPHAGOGASTRODUODENOSCOPY N/A 2022    Procedure: EGD (ESOPHAGOGASTRODUODENOSCOPY);  Surgeon: Micky Paredes III, MD;  Location: [facility];  Service: Endoscopy;  Laterality: N/A;    ESOPHAGOGASTRODUODENOSCOPY N/A 2022    Procedure: EGD (ESOPHAGOGASTRODUODENOSCOPY);  Surgeon: Marcelo Zhong MD;  Location: [facility];  Service: Endoscopy;  Laterality: N/A;    LAPAROSCOPIC CHOLECYSTECTOMY N/A 2022    Procedure: CHOLECYSTECTOMY, LAPAROSCOPIC;  Surgeon: Grey Perez MD;  Location: [facility];  Service: General;  Laterality: N/A;    PLACEMENT OF DUAL-LUMEN VASCULAR CATHETER Left 2022    Procedure: INSERTION-CATHETER-JOSEPH;  Surgeon: Dionte Gan MD;  Location: [facility] OR;  Service: General;  Laterality: Left;    PLACEMENT OF DUAL-LUMEN VASCULAR CATHETER Right 2022    Procedure: INSERTION-CATHETER-Hemosplit;  Surgeon: Dionte Gan MD;  Location: [facility] OR;  Service: General;  Laterality: Right;       Review of patient's allergies indicates:   Allergen Reactions     Penicillins Hives     Current Facility-Administered Medications   Medication Frequency    0.9%  NaCl infusion (for blood administration) Q24H PRN     Current Outpatient Medications   Medication    albuterol (PROVENTIL/VENTOLIN HFA) 90 mcg/actuation inhaler    amLODIPine (NORVASC) 10 MG tablet    apixaban (ELIQUIS) 5 mg Tab    BINAXNOW COVID-19 AG SELF TEST Kit    carvediloL (COREG) 25 MG tablet    cloNIDine 0.2 mg/24 hr td ptwk (CATAPRES) 0.2 mg/24 hr    ferrous sulfate 325 (65 FE) MG EC tablet    FLUoxetine 20 MG capsule    furosemide (LASIX) 40 MG tablet    gabapentin (NEURONTIN) 100 MG capsule    hydrALAZINE (APRESOLINE) 100 MG tablet    HYDROmorphone (DILAUDID) 4 MG tablet    insulin aspart U-100 (NOVOLOG) 100 unit/mL injection    insulin detemir U-100 (LEVEMIR) 100 unit/mL injection    isosorbide mononitrate (IMDUR) 60 MG 24 hr tablet    levETIRAcetam (KEPPRA) 500 MG Tab    mirtazapine (REMERON SOL-TAB) 15 MG disintegrating tablet    ondansetron (ZOFRAN-ODT) 4 MG TbDL    pantoprazole (PROTONIX) 40 MG tablet     Family History       Problem Relation (Age of Onset)    Diabetes Mother, Father          Tobacco Use    Smoking status: Never    Smokeless tobacco: Never   Substance and Sexual Activity    Alcohol use: Not Currently    Drug use: No    Sexual activity: Not Currently     Partners: Male     Birth control/protection: I.U.D.     Review of Systems   Constitutional: Negative.    HENT: Negative.     Eyes: Negative.    Respiratory: Negative.     Cardiovascular: Negative.    Gastrointestinal:  Positive for abdominal pain and nausea.   Genitourinary:  Positive for decreased urine volume (anuric at baseline).   Musculoskeletal:  Positive for back pain.   Skin: Negative.    Neurological: Negative.    Psychiatric/Behavioral: Negative.     Objective:     Vital Signs (Most Recent):  Temp: 97.6 °F (36.4 °C) (03/20/23 1440)  Pulse: 82 (03/20/23 1440)  Resp: 18 (03/20/23 1440)  BP: (!) 161/91 (03/20/23 1440)  SpO2: 98 %  (03/20/23 1440)   Vital Signs (24h Range):  Temp:  [97 °F (36.1 °C)-98.4 °F (36.9 °C)] 97.6 °F (36.4 °C)  Pulse:  [79-87] 82  Resp:  [16-20] 18  SpO2:  [96 %-100 %] 98 %  BP: (145-183)/() 161/91     Weight: 59.9 kg (132 lb) (03/20/23 1113)  Body mass index is 24.14 kg/m².  Body surface area is 1.62 meters squared.    No intake/output data recorded.    Physical Exam  Constitutional:       Appearance: Normal appearance.   HENT:      Head: Normocephalic and atraumatic.      Nose: Nose normal.      Mouth/Throat:      Mouth: Mucous membranes are moist.      Pharynx: Oropharynx is clear.   Eyes:      Conjunctiva/sclera: Conjunctivae normal.   Cardiovascular:      Rate and Rhythm: Normal rate and regular rhythm.      Comments: R IJ TDC   Pulmonary:      Effort: Pulmonary effort is normal.      Breath sounds: Normal breath sounds.   Abdominal:      General: Abdomen is flat.   Musculoskeletal:         General: Normal range of motion.      Cervical back: Normal range of motion and neck supple.   Skin:     General: Skin is warm and dry.   Neurological:      General: No focal deficit present.      Mental Status: She is alert and oriented to person, place, and time.   Psychiatric:         Mood and Affect: Mood normal.         Behavior: Behavior normal.       Significant Labs:  CBC:   Recent Labs   Lab 03/20/23  1136 03/20/23  1202   WBC 4.52  --    RBC 1.87*  --    HGB 5.6*  --    HCT 17.0* 17*   PLT 89*  --    MCV 91  --    MCH 29.9  --    MCHC 32.9  --      CMP:   Recent Labs   Lab 03/20/23  1136   *   CALCIUM 8.9   ALBUMIN 3.0*   PROT 6.7      K 3.7   CO2 24      BUN 54*   CREATININE 6.0*   ALKPHOS 398*   ALT 37   AST 31   BILITOT 2.6*     All labs within the past 24 hours have been reviewed.

## 2023-03-20 NOTE — SUBJECTIVE & OBJECTIVE
Past Medical History:   Diagnosis Date    CKD (chronic kidney disease), stage IV 2022    Diabetes mellitus due to underlying condition with unspecified complications 2022    Gastroparesis 2022    Heart failure with preserved ejection fraction 2022    EF 55% on 3/22    History of gastroesophageal reflux (GERD)     History of supraventricular tachycardia     Hyperkalemia 2022    Hypertensive emergency 2022    Sickle cell trait 2022    Type 2 diabetes mellitus        Past Surgical History:   Procedure Laterality Date     SECTION      x 3    COLONOSCOPY      COLONOSCOPY N/A 2022    Procedure: COLONOSCOPY;  Surgeon: Jagdeep Cedeno MD;  Location: [facility];  Service: Endoscopy;  Laterality: N/A;    ESOPHAGOGASTRODUODENOSCOPY N/A 10/18/2019    Procedure: ESOPHAGOGASTRODUODENOSCOPY (EGD);  Surgeon: Gianluca Mendez MD;  Location: [facility];  Service: Endoscopy;  Laterality: N/A;    ESOPHAGOGASTRODUODENOSCOPY N/A 2022    Procedure: EGD (ESOPHAGOGASTRODUODENOSCOPY);  Surgeon: Micky Paredes III, MD;  Location: [facility];  Service: Endoscopy;  Laterality: N/A;    ESOPHAGOGASTRODUODENOSCOPY N/A 2022    Procedure: EGD (ESOPHAGOGASTRODUODENOSCOPY);  Surgeon: Marcelo Zhong MD;  Location: [facility];  Service: Endoscopy;  Laterality: N/A;    LAPAROSCOPIC CHOLECYSTECTOMY N/A 2022    Procedure: CHOLECYSTECTOMY, LAPAROSCOPIC;  Surgeon: Grey Perez MD;  Location: [facility];  Service: General;  Laterality: N/A;    PLACEMENT OF DUAL-LUMEN VASCULAR CATHETER Left 2022    Procedure: INSERTION-CATHETER-JOESPH;  Surgeon: Dionte Gan MD;  Location: [facility] OR;  Service: General;  Laterality: Left;    PLACEMENT OF DUAL-LUMEN VASCULAR CATHETER Right 2022    Procedure: INSERTION-CATHETER-Hemosplit;  Surgeon: Dionte Gan MD;  Location: [facility] OR;  Service: General;  Laterality: Right;       Review of patient's allergies indicates:   Allergen Reactions     Penicillins Hives       Current Facility-Administered Medications on File Prior to Encounter   Medication    [COMPLETED] insulin regular injection 6 Units 0.06 mL    [COMPLETED] insulin regular injection 8 Units 0.08 mL    [DISCONTINUED] chondroitin-sodium hyaluronate (VISCOAT) 4-3 % (40-30 mg/mL) intra-ocular injection    [DISCONTINUED] cyclopentolate 1% ophthalmic solution 1 drop    [DISCONTINUED] cyclopentolate 1% ophthalmic solution 1 drop    [DISCONTINUED] dexAMETHasone (DECADRON) 4 mg/mL injection    [DISCONTINUED] EPINEPHrine (PF) (ADRENALIN) 1 mg/mL (1 mL) injection    [DISCONTINUED] LIDOcaine (PF) 20 mg/mL (2%) 20 mg/mL (2 %) injection    [DISCONTINUED] moxifloxacin 0.5 % ophthalmic solution 1 drop    [DISCONTINUED] moxifloxacin 0.5 % ophthalmic solution 1 drop    [DISCONTINUED] neomycin-polymyxin-dexamethasone (DEXACINE) 3.5 mg/g-10,000 unit/g-0.1 % ophthalmic ointment    [DISCONTINUED] phenylephrine HCL 2.5% ophthalmic solution 1 drop    [DISCONTINUED] phenylephrine HCL 2.5% ophthalmic solution 1 drop    [DISCONTINUED] prednisoLONE acetate 1 % ophthalmic suspension 1 drop    [DISCONTINUED] prednisoLONE acetate 1 % ophthalmic suspension 1 drop    [DISCONTINUED] TETRAcaine HCl (PF) 0.5 % Drop 1 drop    [DISCONTINUED] TETRAcaine HCl (PF) 0.5 % Drop 1 drop    [DISCONTINUED] vancomycin (VANCOCIN) 500 mg injection     Current Outpatient Medications on File Prior to Encounter   Medication Sig    albuterol (PROVENTIL/VENTOLIN HFA) 90 mcg/actuation inhaler Inhale 2 puffs into the lungs every 6 (six) hours as needed for Wheezing. Rescue    amLODIPine (NORVASC) 10 MG tablet Take 1 tablet every day by oral route for 30 days. (Patient taking differently: Take 10 mg by mouth once daily.)    apixaban (ELIQUIS) 5 mg Tab Take 1 tablet (5 mg total) by mouth 2 (two) times daily.    BINAXNOW COVID-19 AG SELF TEST Kit Take 1 Dose by mouth once.    carvediloL (COREG) 25 MG tablet Take 1 tablet twice a day by oral route for 30  days. (Patient taking differently: Take 25 mg by mouth 2 (two) times daily.)    cloNIDine 0.2 mg/24 hr td ptwk (CATAPRES) 0.2 mg/24 hr Apply 1 patch to skin once every week.    ferrous sulfate 325 (65 FE) MG EC tablet Take 325 mg by mouth once daily.    FLUoxetine 20 MG capsule Take 1 capsule every day by oral route for 30 days. (Patient taking differently: Take 20 mg by mouth once daily.)    furosemide (LASIX) 40 MG tablet Take 40 mg by mouth 2 (two) times a day.    gabapentin (NEURONTIN) 100 MG capsule Take 1 capsule by mouth 3 times daily (Patient taking differently: Take 100 mg by mouth 3 (three) times daily.)    hydrALAZINE (APRESOLINE) 100 MG tablet Take 1 tablet twice a day by oral route for 30 days. (Patient taking differently: Take 100 mg by mouth 2 (two) times daily.)    HYDROmorphone (DILAUDID) 4 MG tablet Take 4 mg by mouth every 12 (twelve) hours as needed for Pain.    insulin aspart U-100 (NOVOLOG) 100 unit/mL injection Inject 8 Units into the skin 3 (three) times daily before meals.    insulin detemir U-100 (LEVEMIR) 100 unit/mL injection Inject 10 Units into the skin every evening.    isosorbide mononitrate (IMDUR) 60 MG 24 hr tablet Take 1 tablet every day by oral route for 30 days. (Patient taking differently: Take 60 mg by mouth once daily.)    levETIRAcetam (KEPPRA) 500 MG Tab Take 1 tablet twice a day by oral route for 30 days. (Patient taking differently: Take 500 mg by mouth once daily.)    mirtazapine (REMERON SOL-TAB) 15 MG disintegrating tablet Take 1 tablet (15 mg total) by mouth nightly.    ondansetron (ZOFRAN-ODT) 4 MG TbDL Place1 tablet on the tongue every 8 hours as needed (Patient taking differently: Take 4 mg by mouth every 6 (six) hours as needed.)    pantoprazole (PROTONIX) 40 MG tablet Take 1 tablet every day by oral route for 30 days. (Patient taking differently: Take 40 mg by mouth once daily.)    [DISCONTINUED] atenoloL (TENORMIN) 50 MG tablet Take 1 tablet (50 mg total) by  mouth every other day.    [DISCONTINUED] omeprazole (PRILOSEC) 20 MG capsule Take 2 capsules (40 mg total) by mouth once daily. for 10 days     Family History       Problem Relation (Age of Onset)    Diabetes Mother, Father          Tobacco Use    Smoking status: Never    Smokeless tobacco: Never   Substance and Sexual Activity    Alcohol use: Not Currently    Drug use: No    Sexual activity: Not Currently     Partners: Male     Birth control/protection: I.U.D.     Review of Systems  Objective:     Vital Signs (Most Recent):  Temp: 97.6 °F (36.4 °C) (03/20/23 1440)  Pulse: 82 (03/20/23 1440)  Resp: 18 (03/20/23 1440)  BP: (!) 161/91 (03/20/23 1440)  SpO2: 98 % (03/20/23 1440)   Vital Signs (24h Range):  Temp:  [97 °F (36.1 °C)-98.4 °F (36.9 °C)] 97.6 °F (36.4 °C)  Pulse:  [79-87] 82  Resp:  [16-20] 18  SpO2:  [96 %-100 %] 98 %  BP: (145-183)/() 161/91     Weight: 59.9 kg (132 lb)  Body mass index is 24.14 kg/m².    Physical Exam  Vitals and nursing note reviewed.   Constitutional:       Appearance: She is not toxic-appearing.      Comments: Writing in bed on arrival but will stop, adjust her position, answer questions easily, then return to writing    HENT:      Head: Normocephalic and atraumatic.   Eyes:      Extraocular Movements: Extraocular movements intact.      Conjunctiva/sclera: Conjunctivae normal.   Cardiovascular:      Rate and Rhythm: Normal rate and regular rhythm.   Pulmonary:      Effort: Pulmonary effort is normal. No respiratory distress.      Breath sounds: Normal breath sounds.   Abdominal:      General: Bowel sounds are normal. There is no distension.      Palpations: Abdomen is soft.      Tenderness: There is abdominal tenderness (generlized tenderness without rebound or guarding).   Musculoskeletal:      Cervical back: Normal range of motion and neck supple.      Right lower leg: No edema.      Left lower leg: No edema.   Skin:     General: Skin is warm and dry.      Capillary Refill:  Capillary refill takes less than 2 seconds.   Neurological:      Mental Status: She is alert and oriented to person, place, and time.   Psychiatric:      Comments: Intermittently writing and crying, with significant worsening after we discussed avoiding pain medications            Significant Labs: All pertinent labs within the past 24 hours have been reviewed.    Significant Imaging: I have reviewed all pertinent imaging results/findings within the past 24 hours.

## 2023-03-20 NOTE — ASSESSMENT & PLAN NOTE
Patient is identified as having Diastolic (HFpEF) heart failure that is Chronic. CHF is currently controlled. Latest ECHO performed and demonstrates- Results for orders placed during the hospital encounter of 01/12/23    Echo    Interpretation Summary  · The left ventricle is normal in size with moderate concentric hypertrophy and normal systolic function.  · The estimated ejection fraction is 55%.  · Grade III left ventricular diastolic dysfunction.  · Normal right ventricular size with normal right ventricular systolic function.  · Moderate left atrial enlargement.  · Moderate to severe tricuspid regurgitation.  · Mild to moderate pulmonic regurgitation.  · Mild mitral regurgitation.  · Normal central venous pressure (3 mmHg).  · The estimated PA systolic pressure is 56 mmHg.  · There is pulmonary hypertension.  · Moderate to large circumferential pericardial effusion. No evidence of tamponade.  . Continue Furosemide and monitor clinical status closely. Monitor on telemetry. Patient is off CHF pathway.  Monitor strict Is&Os and daily weights.  Place on fluid restriction of 1.5 L. Continue to stress to patient importance of self efficacy and  on diet for CHF. Last BNP reviewed- and noted below No results for input(s): BNP, BNPTRIAGEBLO in the last 168 hours..    iHD for volume management

## 2023-03-20 NOTE — ED PROVIDER NOTES
Source of History  patient, family, and EMR    Chief Complaint    Abnormal Lab (Supposed to have retinal detachment repaired at sx. Center. CBG was 340, Sx. Center administered her 12 units of IV insulin. They also report her Hbg/hct low. )      History of Present Illness    Tabby Howard is a 33 y.o. female presenting with patient with multiple medical comorbidities presenting from surgery center after she was found have an elevated blood glucose 360, was supposed to have retinal detachment repair.  Center administer 12 units of IV insulin.  Did not take her insulin today because she was fasting for her surgery.  Hemoglobin hematocrit are also low.  Patient has history of anemia, likely secondary to end-stage renal disease, for which she is on hemodialysis through a right chest wall port on Saturday, Tuesday, Thursday.  Last dialysis session was partial secondary to low blood sugar, and it was Saturday.    Patient denies fever.  She reports abdominal pain in the epigastric region that radiates into her back, something that she is experienced previously.  She attributes this to dialysis.  She no longer gets periods.  Denies hematemesis, melena, bright red blood per rectum.  But she is on anticoagulation (DOAC) for DVT.  She no longer makes urine.  She has required blood transfusions in the past.  Denies syncope, chest pain, dyspnea, but feels generally weak.    Review of Systems    As per HPI and below:  Constitutional symptoms:  No fever or chills, general weakness  Skin symptoms:  No rash    Respiratory symptoms:  No shortness of breath  Cardiovascular symptoms:  No chest pain   Gastrointestinal symptoms:  + abdominal pain in the epigastric region, + nausea, no vomiting, no diarrhea, no rectal bleeding/melena  Genitourinary symptoms:  Does not make urine no vaginal bleeding, no pelvic pain  Musculoskeletal symptoms:  chronic back pain, No joint pains or aches    Neurologic symptoms:  No headache  Endocrine  symptoms:  None except as in HPI  Psychiatric symptoms:  None except as in HPI     Past History    As per HPI and below:  Past Medical History:   Diagnosis Date    CKD (chronic kidney disease), stage IV 2022    Diabetes mellitus due to underlying condition with unspecified complications 2022    Gastroparesis 2022    Heart failure with preserved ejection fraction 2022    EF 55% on 3/22    History of gastroesophageal reflux (GERD)     History of supraventricular tachycardia     Hyperkalemia 2022    Hypertensive emergency 2022    Sickle cell trait 2022    Type 2 diabetes mellitus        Past Surgical History:   Procedure Laterality Date     SECTION      x 3    COLONOSCOPY      COLONOSCOPY N/A 2022    Procedure: COLONOSCOPY;  Surgeon: Jagdeep Cedeno MD;  Location: [facility];  Service: Endoscopy;  Laterality: N/A;    ESOPHAGOGASTRODUODENOSCOPY N/A 10/18/2019    Procedure: ESOPHAGOGASTRODUODENOSCOPY (EGD);  Surgeon: Gianluca Mendez MD;  Location: [facility];  Service: Endoscopy;  Laterality: N/A;    ESOPHAGOGASTRODUODENOSCOPY N/A 2022    Procedure: EGD (ESOPHAGOGASTRODUODENOSCOPY);  Surgeon: Micky Paredes III, MD;  Location: [facility];  Service: Endoscopy;  Laterality: N/A;    ESOPHAGOGASTRODUODENOSCOPY N/A 2022    Procedure: EGD (ESOPHAGOGASTRODUODENOSCOPY);  Surgeon: Marcelo Zhong MD;  Location: [facility];  Service: Endoscopy;  Laterality: N/A;    LAPAROSCOPIC CHOLECYSTECTOMY N/A 2022    Procedure: CHOLECYSTECTOMY, LAPAROSCOPIC;  Surgeon: Grey Perez MD;  Location: [facility];  Service: General;  Laterality: N/A;    PLACEMENT OF DUAL-LUMEN VASCULAR CATHETER Left 2022    Procedure: INSERTION-CATHETER-JOSEPH;  Surgeon: Dionte Gan MD;  Location: [facility] OR;  Service: General;  Laterality: Left;    PLACEMENT OF DUAL-LUMEN VASCULAR CATHETER Right 2022    Procedure: INSERTION-CATHETER-Hemosplit;  Surgeon: Dionte Gan MD;  Location:  [facility] OR;  Service: General;  Laterality: Right;       Social History     Socioeconomic History    Marital status:    Tobacco Use    Smoking status: Never    Smokeless tobacco: Never   Substance and Sexual Activity    Alcohol use: Not Currently    Drug use: No    Sexual activity: Not Currently     Partners: Male     Birth control/protection: I.U.D.     Social Determinants of Health     Financial Resource Strain: Unknown    Difficulty of Paying Living Expenses: Patient refused   Food Insecurity: Unknown    Worried About Running Out of Food in the Last Year: Patient refused    Ran Out of Food in the Last Year: Patient refused   Transportation Needs: Unknown    Lack of Transportation (Medical): Patient refused    Lack of Transportation (Non-Medical): Patient refused   Physical Activity: Unknown    Days of Exercise per Week: Patient refused    Minutes of Exercise per Session: Patient refused   Stress: Unknown    Feeling of Stress : Patient refused   Social Connections: Unknown    Frequency of Communication with Friends and Family: Patient refused    Frequency of Social Gatherings with Friends and Family: Patient refused    Attends [employer] Services: Patient refused    Active Member of Clubs or Organizations: Patient refused    Attends Club or Organization Meetings: Patient refused    Marital Status: Patient refused   Housing Stability: Unknown    Unable to Pay for Housing in the Last Year: Patient refused    Unstable Housing in the Last Year: Patient refused       Family History   Problem Relation Age of Onset    Diabetes Mother     Diabetes Father        Review of patient's allergies indicates:   Allergen Reactions    Penicillins Hives       Current Facility-Administered Medications on File Prior to Encounter   Medication Dose Route Frequency Provider Last Rate Last Admin    [COMPLETED] insulin regular injection 6 Units 0.06 mL  6 Units Intravenous Once Abdulkadir Raygoza MD   4 Units at 03/20/23 0959     [COMPLETED] insulin regular injection 8 Units 0.08 mL  8 Units Intravenous Once Abdulkadir Raygoza MD   8 Units at 03/20/23 1038    [DISCONTINUED] chondroitin-sodium hyaluronate (VISCOAT) 4-3 % (40-30 mg/mL) intra-ocular injection             [DISCONTINUED] cyclopentolate 1% ophthalmic solution 1 drop  1 drop Left Eye On Call Procedure Alfonso Garay MD        [DISCONTINUED] cyclopentolate 1% ophthalmic solution 1 drop  1 drop Left Eye On Call Procedure Alfonso Garay MD        [DISCONTINUED] dexAMETHasone (DECADRON) 4 mg/mL injection             [DISCONTINUED] EPINEPHrine (PF) (ADRENALIN) 1 mg/mL (1 mL) injection             [DISCONTINUED] LIDOcaine (PF) 20 mg/mL (2%) 20 mg/mL (2 %) injection             [DISCONTINUED] moxifloxacin 0.5 % ophthalmic solution 1 drop  1 drop Left Eye On Call Procedure Alfonso Garay MD        [DISCONTINUED] moxifloxacin 0.5 % ophthalmic solution 1 drop  1 drop Left Eye On Call Procedure Alfonso Garay MD        [DISCONTINUED] neomycin-polymyxin-dexamethasone (DEXACINE) 3.5 mg/g-10,000 unit/g-0.1 % ophthalmic ointment             [DISCONTINUED] phenylephrine HCL 2.5% ophthalmic solution 1 drop  1 drop Left Eye On Call Procedure Alfonso Garay MD        [DISCONTINUED] phenylephrine HCL 2.5% ophthalmic solution 1 drop  1 drop Left Eye On Call Procedure Alfonso Garay MD        [DISCONTINUED] prednisoLONE acetate 1 % ophthalmic suspension 1 drop  1 drop Left Eye On Call Procedure Alfonso Garay MD        [DISCONTINUED] prednisoLONE acetate 1 % ophthalmic suspension 1 drop  1 drop Left Eye On Call Procedure Alfonso Garay MD        [DISCONTINUED] TETRAcaine HCl (PF) 0.5 % Drop 1 drop  1 drop Left Eye On Call Procedure Alfonso Garay MD        [DISCONTINUED] TETRAcaine HCl (PF) 0.5 % Drop 1 drop  1 drop Left Eye On Call Procedure Alfonso Garay MD        [DISCONTINUED] vancomycin (VANCOCIN) 500 mg injection              Current Outpatient Medications on File Prior to Encounter   Medication  Sig Dispense Refill    albuterol (PROVENTIL/VENTOLIN HFA) 90 mcg/actuation inhaler Inhale 2 puffs into the lungs every 6 (six) hours as needed for Wheezing. Rescue 18 g 3    amLODIPine (NORVASC) 10 MG tablet Take 1 tablet every day by oral route for 30 days. (Patient taking differently: Take 10 mg by mouth once daily.) 30 tablet 2    apixaban (ELIQUIS) 5 mg Tab Take 1 tablet (5 mg total) by mouth 2 (two) times daily. 60 tablet 2    BINAXNOW COVID-19 AG SELF TEST Kit Take 1 Dose by mouth once.      carvediloL (COREG) 25 MG tablet Take 1 tablet twice a day by oral route for 30 days. (Patient taking differently: Take 25 mg by mouth 2 (two) times daily.) 60 tablet 2    cloNIDine 0.2 mg/24 hr td ptwk (CATAPRES) 0.2 mg/24 hr Apply 1 patch to skin once every week. 4 patch 2    ferrous sulfate 325 (65 FE) MG EC tablet Take 325 mg by mouth once daily.      FLUoxetine 20 MG capsule Take 1 capsule every day by oral route for 30 days. (Patient taking differently: Take 20 mg by mouth once daily.) 30 capsule 2    furosemide (LASIX) 40 MG tablet Take 40 mg by mouth 2 (two) times a day.      gabapentin (NEURONTIN) 100 MG capsule Take 1 capsule by mouth 3 times daily (Patient taking differently: Take 100 mg by mouth 3 (three) times daily.) 90 capsule 2    hydrALAZINE (APRESOLINE) 100 MG tablet Take 1 tablet twice a day by oral route for 30 days. (Patient taking differently: Take 100 mg by mouth 2 (two) times daily.) 60 tablet 2    HYDROmorphone (DILAUDID) 4 MG tablet Take 4 mg by mouth every 12 (twelve) hours as needed for Pain.      insulin aspart U-100 (NOVOLOG) 100 unit/mL injection Inject 8 Units into the skin 3 (three) times daily before meals.      insulin detemir U-100 (LEVEMIR) 100 unit/mL injection Inject 10 Units into the skin every evening.      isosorbide mononitrate (IMDUR) 60 MG 24 hr tablet Take 1 tablet every day by oral route for 30 days. (Patient taking differently: Take 60 mg by mouth once daily.) 30 tablet 2     levETIRAcetam (KEPPRA) 500 MG Tab Take 1 tablet twice a day by oral route for 30 days. (Patient taking differently: Take 500 mg by mouth once daily.) 60 tablet 2    mirtazapine (REMERON SOL-TAB) 15 MG disintegrating tablet Take 1 tablet (15 mg total) by mouth nightly. 90 tablet 0    ondansetron (ZOFRAN-ODT) 4 MG TbDL Place1 tablet on the tongue every 8 hours as needed (Patient taking differently: Take 4 mg by mouth every 6 (six) hours as needed.) 24 tablet 0    pantoprazole (PROTONIX) 40 MG tablet Take 1 tablet every day by oral route for 30 days. (Patient taking differently: Take 40 mg by mouth once daily.) 30 tablet 2    [DISCONTINUED] atenoloL (TENORMIN) 50 MG tablet Take 1 tablet (50 mg total) by mouth every other day. 45 tablet 3    [DISCONTINUED] omeprazole (PRILOSEC) 20 MG capsule Take 2 capsules (40 mg total) by mouth once daily. for 10 days 20 capsule 0       Physical Exam    Reviewed nursing notes.  Vitals:    03/20/23 1113 03/20/23 1144 03/20/23 1204 03/20/23 1425   BP: (!) 183/100  (!) 145/88 (!) 153/80   BP Location: Right arm      Patient Position: Sitting      Pulse: 87  84 79   Resp: 16 18 19 18   Temp: 98.3 °F (36.8 °C)   98.4 °F (36.9 °C)   TempSrc: Oral   Oral   SpO2: 96%  99% 100%   Weight: 59.9 kg (132 lb)        General:  Alert, no acute distress.  Appears uncomfortable.  Skin:  Warm, dry, intact.  No rash.  Head:  Normocephalic, atraumatic.    Neck:  Supple.   Eye:  Normal conjunctiva.  Poor eye sight.  Ears, nose, mouth and throat:  Normal phonation.  Pale mucosa.  Cardiovascular:  Regular rate and rhythm, Normal peripheral perfusion, No edema.    Respiratory: respirations are non-labored.  Clear to auscultation.  Gastrointestinal:   Soft, tenderness palpation in the epigastric region, not peritonitic  Back:  Nontender.    Neurological:  Alert and oriented to person, place, time, and situation.    Psychiatric:  Cooperative, appropriate mood & affect.       Initial MDM and Data Review    33  y.o. female presenting for evaluation of concern for elevated blood glucose as well as low hemoglobin and hematocrit without evidence or symptoms of bleeding from outpatient surgery clinic where she was slated to receive retinal surgery by ophthalmology.  She does exhibit some symptoms of anemia with general weakness and pallor, and has abdominal discomfort in the epigastric region with nausea of unclear etiology.    Differential includes:  DKA, gastroparesis/gastritis, intussusception given recent history, less likely bowel obstruction, electrolyte disturbance, HHNK, acute on chronic anemia    Work-up includes:  Accu-Chek, EKG, VBG, CBC, CMP, lipase, CTAP and chest x-ray, type and screen    Interventions include:  Pain control, antiemetics, consented for blood    The patient has significant medical comorbidities that influence decision making for this acute process, such as:  End-stage renal disease, diabetes, anemia    I decided to obtain the patient's medical records and review relevant documentation from hospital records.  Pertinent information is noted.  CT scan earlier this month was concerning for intussusception.  She is status post cholecystectomy.  This morning's blood work shows hemoglobin 5.8, hematocrit 17.4, normal white blood cell count, thrombocytopenia platelets 92, normal potassium, anion gap of 19 with a glucose of 340  The gave her 12 units of insulin    Medications   0.9%  NaCl infusion (for blood administration) (has no administration in time range)   famotidine (PF) injection 20 mg (20 mg Intravenous Given 3/20/23 1144)   morphine injection 4 mg (4 mg Intravenous Given 3/20/23 1144)   0.9%  NaCl infusion (500 mLs Intravenous New Bag 3/20/23 1429)   ondansetron injection 4 mg (4 mg Intravenous Given 3/20/23 1217)   iohexoL (OMNIPAQUE 350) injection 100 mL (100 mLs Intravenous Given 3/20/23 1400)       Results and ED Course    Labs Reviewed   CBC W/ AUTO DIFFERENTIAL - Abnormal; Notable for the  following components:       Result Value    RBC 1.87 (*)     Hemoglobin 5.6 (*)     Hematocrit 17.0 (*)     RDW 19.2 (*)     Platelets 89 (*)     Immature Granulocytes 0.9 (*)     Lymph # 0.6 (*)     Gran % 75.8 (*)     Lymph % 13.7 (*)     All other components within normal limits    Narrative:     Release to patient->Immediate                  ADD ON LIPASE PER RASHID MALDONADO RN PER AKASH FERRELL DO ORDER#                   [id number] @  03/20/2023  12:15                   H and H critical SECURE MESSAGED and confirmation received from                   RASHID MALDONADO RN in [facility]-ED by ECM 03/20/2023 12:24   COMPREHENSIVE METABOLIC PANEL - Abnormal; Notable for the following components:    Glucose 154 (*)     BUN 54 (*)     Creatinine 6.0 (*)     Albumin 3.0 (*)     Total Bilirubin 2.6 (*)     Alkaline Phosphatase 398 (*)     Anion Gap 17 (*)     eGFR 8.8 (*)     All other components within normal limits    Narrative:     Release to patient->Immediate                  ADD ON LIPASE PER RASHID MALDONADO RN PER AKASH FERRELL DO ORDER#                   [id number] @  03/20/2023  12:15    LACTIC ACID, PLASMA - Abnormal; Notable for the following components:    Lactate (Lactic Acid) 2.6 (*)     All other components within normal limits    Narrative:     Release to patient->Immediate                  ADD ON LIPASE PER RASHID MALDONADO RN PER AKASH FERRELL,  ORDER#                   [id number] @  03/20/2023  12:15    POCT GLUCOSE - Abnormal; Notable for the following components:    POCT Glucose 216 (*)     All other components within normal limits   ISTAT PROCEDURE - Abnormal; Notable for the following components:    POC PCO2 50.0 (*)     POC PO2 32 (*)     POC HCO3 28.9 (*)     POC SATURATED O2 59 (*)     POC TCO2 30 (*)     POC Hematocrit 17 (*)     All other components within normal limits   BETA - HYDROXYBUTYRATE, SERUM    Narrative:     Release to patient->Immediate   HCG, QUANTITATIVE    Narrative:      Release to patient->Immediate                  ADD ON LIPASE PER RASHID MALDONADO RN PER AKASH FERRELL, DO ORDER#                   [id number] @  03/20/2023  12:15    LIPASE   LIPASE    Narrative:     Release to patient->Immediate                  ADD ON LIPASE PER RASHID MALDONADO RN PER AKASH FERRELL, DO ORDER#                   [id number] @  03/20/2023  12:15    TYPE & SCREEN   POCT GLUCOSE MONITORING CONTINUOUS   PREPARE RBC SOFT       Imaging Results              CT Abdomen Pelvis With Contrast (Final result)  Result time 03/20/23 14:37:56      Final result by Max Mancia MD (03/20/23 14:37:56)                   Impression:      1. Hepatomegaly noting possible steatosis, correlation with LFTs recommended.  2. Circumferential thickening of the urinary bladder walls, correlation with urinalysis recommended to exclude changes of cystitis.  3. Findings suggesting volume overload including body wall anasarca, moderate pericardial effusion, and abdominopelvic ascites.  4. Please see above for several additional findings.      Electronically signed by: Max Mancia MD  Date:    03/20/2023  Time:    14:37               Narrative:    EXAMINATION:  CT ABDOMEN PELVIS WITH CONTRAST    CLINICAL HISTORY:  Abdominal abscess/infection suspected;Abdominal pain, acute, nonlocalized;hx of intussussception;    TECHNIQUE:  Low dose axial images, sagittal and coronal reformations were obtained from the lung bases to the pubic symphysis following the IV administration of 100 mL of Omnipaque 350 .  Oral contrast was not given.    COMPARISON:  CT 03/10/2023    FINDINGS:  Images of the lower thorax are remarkable for bilateral dependent atelectasis/scarring.  Scattered ground-glass attenuation within the bilateral lower lobes may reflect superimposed edema.  There is a moderate pericardial effusion.    Allowing for motion artifact, the liver is somewhat hypoattenuating, possibly reflecting steatosis, correlation with LFTs  recommended.  The liver is enlarged.  The spleen is prominent.  The pancreas and adrenal glands are grossly unremarkable.  The gallbladder is surgically absent, no significant biliary dilation status post cholecystectomy.  The stomach is decompressed without wall thickening.  The portal vein, splenic vein, SMV, celiac axis and SMA all are patent.  No significant abdominal lymphadenopathy.  There is strand-like fluid in the abdomen.    The kidneys enhance symmetrically without nephrolithiasis.  There is mild left hydronephrosis.  The bilateral ureters are unable to be followed in their entirety to the urinary bladder, no definite calculi seen.  There is mild atrophy of the kidneys.  There is wall thickening of the urinary bladder.  The uterus and adnexa are grossly unremarkable noting follicles within the left ovary.  There is abdominopelvic ascites.    There are a few scattered colonic diverticula without inflammation.  There is moderate stool in the right colon.  The terminal ileum and appendix are unremarkable.  The small bowel is grossly unremarkable.  There are a few scattered shotty periaortic and paracaval lymph nodes.  No focal organized pelvic fluid collection.  There is extensive calcification of the aorta and its branches.    No acute osseous abnormality.  There is somewhat sclerotic appearance of the osseous structures, correlation with any history of renal osteodystrophy.  There is body wall anasarca.                                       X-Ray Chest AP Portable (Final result)  Result time 03/20/23 12:55:12      Final result by Nura Evangelista MD (03/20/23 12:55:12)                   Impression:      See above      Electronically signed by: Nura Evangelista MD  Date:    03/20/2023  Time:    12:55               Narrative:    EXAMINATION:  XR CHEST AP PORTABLE    CLINICAL HISTORY:  hyperglycemia;    TECHNIQUE:  Single frontal view of the chest was performed.    COMPARISON:  N 03/10/2023  one    FINDINGS:  Central venous catheter remain in the SVC.  Cardiomegaly.  The lungs are clear.  No pleural effusion                                      ED Course as of 03/20/23 1502   Mon Mar 20, 2023   1209 POCT Glucose(!): 216 [AC]   1209 POC Potassium: 3.6 [AC]   1236 Lactate, John(!): 2.6 [AC]   1236 Sodium: 141 [AC]   1236 Potassium: 3.7 [AC]   1236 Glucose(!): 154 [AC]   1236 Anion Gap(!): 17 [AC]   1236 Beta-Hydroxybutyrate: 0.1  Low beta hydroxybutyrate with anion gap of 17, acidosis not likely, awaiting VBG [AC]   1236 Hemoglobin(!!): 5.6  Consistent with prior labs from this morning, awaiting transfusion [AC]   1441 CT Abdomen Pelvis With Contrast  No acute intra-abdominal findings necessitating surgical consultation, however there findings of anasarca and volume overload consistent with prior. [AC]      ED Course User Index  [AC] Amandeep Alford DO           EKG interpreted by myself    EKG  Performed: 1154  Rate/Rhythm/Axis: 83 bpm, sinus rhythm, nml axis  QRS 80 ms  Qtc 512 ms  Impression:  Normal sinus rhythm with slightly prolonged QTC   No acute ischemic changes      Relevant imaging interpreted by myself  Ascites and anasarca present, pericardial effusion looks the same as earlier this month   Reviewed radiology read and there are no other acute findings of concern    Impression and Plan    33 y.o. female with findings of anemia with generalized weakness and hemodynamic stability, ongoing abdominal pain and back pain based on the work up in the emergency department as above.  Sent from Ophthalmology surgery    Important lab/imaging findings include:  Hemoglobin 5.6, normal potassium, anion gap 17 with low beta hydroxybutyrate and improved glucose    All tests, treatment options and disposition options were discussed with the patient.  The decision was made to admit the patient to the hospital.    The patient was admitted in stable condition and all further questions/concerns by patient and/or  family were addressed.  Admit to Hospital Medicine for symptomatic anemia, discussed care with the team  May need to touch base with ophtho when parameters improved           Final diagnoses:  [R73.9] Hyperglycemia  [D64.9] Symptomatic anemia (Primary)        ED Disposition Condition    Observation Stable                  Amandeep Alford DO  03/20/23 0566

## 2023-03-20 NOTE — ASSESSMENT & PLAN NOTE
She has a known history of ESRD-associated anemia. Labs with Hgb 5.6, plt 89. In review of records, recent baseline Hgb 7.5-8, has recent history of thrombocytopenia. Automatic diff without schistocytes, pathology interpretation pending. LFTs with bili at baseline 2. Haptoglobin undetectable at baseline. LDH pending. She does take anticoagulation ppx for DVT, but has no black/bloody stools, bloody emesis, trauma. Acute blood loss a less likely etiology. Considering possibility of hemolysis - haptoglobin appears to be chronically undetectable. LDH pending. She has no clear medication etiologies, no evident source of infection as precipitant. Blood cultures pend. Considered possibility of ESRD production defect, but notably retic was elevated (inadequate, but still elevated). Will monitor CBC closely and provide supportive care pending further evaluation     - follow up INR, LDH  - consider hematology consult in the AM  - transfuse for Hgb <7  - monitor for s/s bleeding  - holding eliquis pending further stability of hemoglobin

## 2023-03-21 VITALS
TEMPERATURE: 98 F | HEART RATE: 76 BPM | HEIGHT: 62 IN | WEIGHT: 125 LBS | OXYGEN SATURATION: 93 % | RESPIRATION RATE: 18 BRPM | DIASTOLIC BLOOD PRESSURE: 70 MMHG | SYSTOLIC BLOOD PRESSURE: 120 MMHG | BODY MASS INDEX: 23 KG/M2

## 2023-03-21 LAB
ALBUMIN SERPL BCP-MCNC: 2.8 G/DL (ref 3.5–5.2)
ANION GAP SERPL CALC-SCNC: 6 MMOL/L (ref 8–16)
BASOPHILS # BLD AUTO: 0.02 K/UL (ref 0–0.2)
BASOPHILS # BLD AUTO: 0.04 K/UL (ref 0–0.2)
BASOPHILS NFR BLD: 0.3 % (ref 0–1.9)
BASOPHILS NFR BLD: 0.6 % (ref 0–1.9)
BUN SERPL-MCNC: 30 MG/DL (ref 6–20)
CALCIUM SERPL-MCNC: 8.6 MG/DL (ref 8.7–10.5)
CHLORIDE SERPL-SCNC: 104 MMOL/L (ref 95–110)
CO2 SERPL-SCNC: 26 MMOL/L (ref 23–29)
CREAT SERPL-MCNC: 3.7 MG/DL (ref 0.5–1.4)
DIFFERENTIAL METHOD: ABNORMAL
DIFFERENTIAL METHOD: ABNORMAL
EOSINOPHIL # BLD AUTO: 0.1 K/UL (ref 0–0.5)
EOSINOPHIL # BLD AUTO: 0.1 K/UL (ref 0–0.5)
EOSINOPHIL NFR BLD: 1 % (ref 0–8)
EOSINOPHIL NFR BLD: 2 % (ref 0–8)
ERYTHROCYTE [DISTWIDTH] IN BLOOD BY AUTOMATED COUNT: 17.6 % (ref 11.5–14.5)
ERYTHROCYTE [DISTWIDTH] IN BLOOD BY AUTOMATED COUNT: 17.9 % (ref 11.5–14.5)
EST. GFR  (NO RACE VARIABLE): 15.8 ML/MIN/1.73 M^2
GLUCOSE SERPL-MCNC: 69 MG/DL (ref 70–110)
HCT VFR BLD AUTO: 24.3 % (ref 37–48.5)
HCT VFR BLD AUTO: 24.9 % (ref 37–48.5)
HGB BLD-MCNC: 8 G/DL (ref 12–16)
HGB BLD-MCNC: 8 G/DL (ref 12–16)
IMM GRANULOCYTES # BLD AUTO: 0.03 K/UL (ref 0–0.04)
IMM GRANULOCYTES # BLD AUTO: 0.04 K/UL (ref 0–0.04)
IMM GRANULOCYTES NFR BLD AUTO: 0.5 % (ref 0–0.5)
IMM GRANULOCYTES NFR BLD AUTO: 0.6 % (ref 0–0.5)
LYMPHOCYTES # BLD AUTO: 0.7 K/UL (ref 1–4.8)
LYMPHOCYTES # BLD AUTO: 0.8 K/UL (ref 1–4.8)
LYMPHOCYTES NFR BLD: 11.7 % (ref 18–48)
LYMPHOCYTES NFR BLD: 12.4 % (ref 18–48)
MAGNESIUM SERPL-MCNC: 1.9 MG/DL (ref 1.6–2.6)
MCH RBC QN AUTO: 29.5 PG (ref 27–31)
MCH RBC QN AUTO: 29.5 PG (ref 27–31)
MCHC RBC AUTO-ENTMCNC: 32.1 G/DL (ref 32–36)
MCHC RBC AUTO-ENTMCNC: 32.9 G/DL (ref 32–36)
MCV RBC AUTO: 90 FL (ref 82–98)
MCV RBC AUTO: 92 FL (ref 82–98)
MONOCYTES # BLD AUTO: 0.5 K/UL (ref 0.3–1)
MONOCYTES # BLD AUTO: 0.6 K/UL (ref 0.3–1)
MONOCYTES NFR BLD: 9.2 % (ref 4–15)
MONOCYTES NFR BLD: 9.2 % (ref 4–15)
NEUTROPHILS # BLD AUTO: 4.5 K/UL (ref 1.8–7.7)
NEUTROPHILS # BLD AUTO: 5.2 K/UL (ref 1.8–7.7)
NEUTROPHILS NFR BLD: 75.6 % (ref 38–73)
NEUTROPHILS NFR BLD: 76.9 % (ref 38–73)
NRBC BLD-RTO: 1 /100 WBC
NRBC BLD-RTO: 1 /100 WBC
PHOSPHATE SERPL-MCNC: 3.6 MG/DL (ref 2.7–4.5)
PLATELET # BLD AUTO: 90 K/UL (ref 150–450)
PLATELET # BLD AUTO: 98 K/UL (ref 150–450)
PMV BLD AUTO: 9.6 FL (ref 9.2–12.9)
PMV BLD AUTO: 9.7 FL (ref 9.2–12.9)
POCT GLUCOSE: 179 MG/DL (ref 70–110)
POCT GLUCOSE: 250 MG/DL (ref 70–110)
POCT GLUCOSE: 252 MG/DL (ref 70–110)
POCT GLUCOSE: 52 MG/DL (ref 70–110)
POTASSIUM SERPL-SCNC: 3.8 MMOL/L (ref 3.5–5.1)
RBC # BLD AUTO: 2.71 M/UL (ref 4–5.4)
RBC # BLD AUTO: 2.71 M/UL (ref 4–5.4)
SODIUM SERPL-SCNC: 136 MMOL/L (ref 136–145)
WBC # BLD AUTO: 5.9 K/UL (ref 3.9–12.7)
WBC # BLD AUTO: 6.74 K/UL (ref 3.9–12.7)

## 2023-03-21 PROCEDURE — G0257 UNSCHED DIALYSIS ESRD PT HOS: HCPCS | Mod: NTX

## 2023-03-21 PROCEDURE — 63600175 PHARM REV CODE 636 W HCPCS: Mod: NTX | Performed by: STUDENT IN AN ORGANIZED HEALTH CARE EDUCATION/TRAINING PROGRAM

## 2023-03-21 PROCEDURE — 25000003 PHARM REV CODE 250: Mod: NTX | Performed by: STUDENT IN AN ORGANIZED HEALTH CARE EDUCATION/TRAINING PROGRAM

## 2023-03-21 PROCEDURE — 80069 RENAL FUNCTION PANEL: CPT | Mod: NTX | Performed by: STUDENT IN AN ORGANIZED HEALTH CARE EDUCATION/TRAINING PROGRAM

## 2023-03-21 PROCEDURE — 99239 HOSP IP/OBS DSCHRG MGMT >30: CPT | Mod: NTX,,, | Performed by: STUDENT IN AN ORGANIZED HEALTH CARE EDUCATION/TRAINING PROGRAM

## 2023-03-21 PROCEDURE — 99214 OFFICE O/P EST MOD 30 MIN: CPT | Mod: NTX,,, | Performed by: NURSE PRACTITIONER

## 2023-03-21 PROCEDURE — 99214 PR OFFICE/OUTPT VISIT, EST, LEVL IV, 30-39 MIN: ICD-10-PCS | Mod: NTX,,, | Performed by: NURSE PRACTITIONER

## 2023-03-21 PROCEDURE — 25000003 PHARM REV CODE 250: Mod: NTX | Performed by: INTERNAL MEDICINE

## 2023-03-21 PROCEDURE — 80100014 HC HEMODIALYSIS 1:1: Mod: NTX

## 2023-03-21 PROCEDURE — 99239 PR HOSPITAL DISCHARGE DAY,>30 MIN: ICD-10-PCS | Mod: NTX,,, | Performed by: STUDENT IN AN ORGANIZED HEALTH CARE EDUCATION/TRAINING PROGRAM

## 2023-03-21 PROCEDURE — 96372 THER/PROPH/DIAG INJ SC/IM: CPT | Mod: 59,NTX | Performed by: STUDENT IN AN ORGANIZED HEALTH CARE EDUCATION/TRAINING PROGRAM

## 2023-03-21 PROCEDURE — 83735 ASSAY OF MAGNESIUM: CPT | Mod: NTX | Performed by: STUDENT IN AN ORGANIZED HEALTH CARE EDUCATION/TRAINING PROGRAM

## 2023-03-21 PROCEDURE — 85025 COMPLETE CBC W/AUTO DIFF WBC: CPT | Mod: NTX | Performed by: STUDENT IN AN ORGANIZED HEALTH CARE EDUCATION/TRAINING PROGRAM

## 2023-03-21 PROCEDURE — 94761 N-INVAS EAR/PLS OXIMETRY MLT: CPT | Mod: NTX

## 2023-03-21 PROCEDURE — 36415 COLL VENOUS BLD VENIPUNCTURE: CPT | Mod: NTX | Performed by: STUDENT IN AN ORGANIZED HEALTH CARE EDUCATION/TRAINING PROGRAM

## 2023-03-21 PROCEDURE — G0378 HOSPITAL OBSERVATION PER HR: HCPCS | Mod: NTX

## 2023-03-21 RX ORDER — SODIUM CHLORIDE 9 MG/ML
INJECTION, SOLUTION INTRAVENOUS ONCE
Status: DISCONTINUED | OUTPATIENT
Start: 2023-03-22 | End: 2023-03-21 | Stop reason: HOSPADM

## 2023-03-21 RX ADMIN — FUROSEMIDE 40 MG: 40 TABLET ORAL at 08:03

## 2023-03-21 RX ADMIN — GABAPENTIN 100 MG: 100 CAPSULE ORAL at 03:03

## 2023-03-21 RX ADMIN — ISOSORBIDE MONONITRATE 60 MG: 60 TABLET, EXTENDED RELEASE ORAL at 08:03

## 2023-03-21 RX ADMIN — HYDRALAZINE HYDROCHLORIDE 100 MG: 50 TABLET ORAL at 08:03

## 2023-03-21 RX ADMIN — AMLODIPINE BESYLATE 10 MG: 10 TABLET ORAL at 08:03

## 2023-03-21 RX ADMIN — INSULIN ASPART 2 UNITS: 100 INJECTION, SOLUTION INTRAVENOUS; SUBCUTANEOUS at 03:03

## 2023-03-21 RX ADMIN — DEXTROSE 15000 MG: 15 GEL ORAL at 08:03

## 2023-03-21 RX ADMIN — PANTOPRAZOLE SODIUM 40 MG: 40 TABLET, DELAYED RELEASE ORAL at 08:03

## 2023-03-21 RX ADMIN — ACETAMINOPHEN 650 MG: 325 TABLET ORAL at 06:03

## 2023-03-21 RX ADMIN — FLUOXETINE 20 MG: 20 CAPSULE ORAL at 08:03

## 2023-03-21 RX ADMIN — MUPIROCIN: 20 OINTMENT TOPICAL at 08:03

## 2023-03-21 RX ADMIN — GABAPENTIN 100 MG: 100 CAPSULE ORAL at 08:03

## 2023-03-21 RX ADMIN — FERROUS SULFATE TAB 325 MG (65 MG ELEMENTAL FE) 1 EACH: 325 (65 FE) TAB at 08:03

## 2023-03-21 RX ADMIN — LEVETIRACETAM 500 MG: 500 TABLET, FILM COATED ORAL at 08:03

## 2023-03-21 RX ADMIN — ACETAMINOPHEN 650 MG: 325 TABLET ORAL at 11:03

## 2023-03-21 RX ADMIN — CARVEDILOL 25 MG: 25 TABLET, FILM COATED ORAL at 08:03

## 2023-03-21 NOTE — HOSPITAL COURSE
* Anemia in ESRD (end-stage renal disease)  She has a known history of ESRD-associated anemia. Labs with Hgb 5.6, plt 89 on admission. In review of records, recent baseline Hgb 7.5-8, has recent history of thrombocytopenia. Automatic diff without schistocytes. LFTs with bili at baseline 2. Haptoglobin undetectable at baseline. LDH in the 400s, which appears at baseline without acute increase. She does take anticoagulation ppx for DVT, but has no black/bloody stools, bloody emesis, trauma. Acute blood loss a less likely etiology. Considering possibility of hemolysis - haptoglobin appears to be chronically undetectable and LDH chronically elevated. Do not feel that repeat hemolysis labs indicated as the original numbers were not far off of her baseline, and repeat would likely not change her clinical management given overall clinical and CBC stability. She has no clear medication etiologies, no evident source of infection as precipitant. Blood cultures sent, but not started on abx given low suspicion. Considered possibility of ESRD production defect, but uncertain as retic was elevated (inadequate, but still elevated). She received 2u pRBC in the ED. Her post-transfusion Hgb with appropriate rise, with stability on repeat check. Plt also stable/improving. PT/INR and PTT overall reassuring. Low suspicion for ongoing process given stability of vitals and hemoglobin + lack of associated symptoms. Continued on home eliquis at discharge. She was discharged home in good condition, with strict follow up and return counseling provided. Instructed her to be seen by her PCP by the end of the week for repeat lab draw + diabetes management.      Congestive heart failure with left ventricular diastolic dysfunction  Patient is identified as having Diastolic (HFpEF) heart failure that is Chronic. CHF is currently controlled. Latest ECHO performed and demonstrates- Results for orders placed during the hospital encounter of 01/12/23      Echo     Interpretation Summary  · The left ventricle is normal in size with moderate concentric hypertrophy and normal systolic function.  · The estimated ejection fraction is 55%.  · Grade III left ventricular diastolic dysfunction.  · Normal right ventricular size with normal right ventricular systolic function.  · Moderate left atrial enlargement.  · Moderate to severe tricuspid regurgitation.  · Mild to moderate pulmonic regurgitation.  · Mild mitral regurgitation.  · Normal central venous pressure (3 mmHg).  · The estimated PA systolic pressure is 56 mmHg.  · There is pulmonary hypertension.  · Moderate to large circumferential pericardial effusion. No evidence of tamponade.  . Continue Furosemide and monitor clinical status closely. Monitor on telemetry. Patient is off CHF pathway.  Monitor strict Is&Os and daily weights.  Place on fluid restriction of 1.5 L. Continue to stress to patient importance of self efficacy and  on diet for CHF. Last BNP reviewed- and noted below No results for input(s): BNP, BNPTRIAGEBLO in the last 168 hours..     iHD for volume management         DM (diabetes mellitus)  Patient's FSGs are controlled on current medication regimen.        Hold Oral hypoglycemics while patient is in the hospital.  Had some episodes of hypoglycemia on long acting 10u + short acting 4u. Discharged home on just 5u long acting without short acting   Close PCP follow up      Drug-seeking behavior  Endorses nausea, vomiting, pain - these are chronic symptoms. Initially when I asked she says these are brand new symptoms. However, in review of records she has had numerous hospitalizations in which she complains of identical pain and requests opioids. She has had numerous normal CT scans of her abdomen recently, and no organic etiology has been identified. EGD done 12/2022 normal. GI at that time felt that diagnosis was likely gastroparesis, and encouraged dc opioids. Concern for drug seeking  behavior     Thrombocytopenia  Plt 89 on admission --> 89 on discharge   In review of recent history, this plt count has been typical for her in the recent months        Uncontrolled hypertension  Continue home medications, reviewed on admission. Improved BP        Deep vein thrombosis (DVT) of non-extremity vein  Continue home eliquis  No s/s active bleeding as above        ESRD (end stage renal disease) on dialysis  Nephrology consulted on admission  iHD TTS        Seizure disorder  Continue home keppra

## 2023-03-21 NOTE — PROGRESS NOTES
[facility]  Nephrology  Progress Note    Patient Name: Tabby Hoawrd  MRN: [id number]  Admission Date: 3/20/2023  Hospital Length of Stay: 0 days  Attending Provider: Leah Munson MD   Primary Care Physician: AIDEN CONNER NP  Principal Problem:Anemia in ESRD (end-stage renal disease)      Interval History: Tolerated HD yesterday, with a net UF of 1L. Hg 8.0 this morning.     Objective:     Vital Signs (Most Recent):  Temp: 97.6 °F (36.4 °C) (03/21/23 1140)  Pulse: 77 (03/21/23 1140)  Resp: 18 (03/21/23 1140)  BP: (!) 109/59 (03/21/23 1140)  SpO2: (!) 87 % (03/21/23 1140)   Vital Signs (24h Range):  Temp:  [96.4 °F (35.8 °C)-99.2 °F (37.3 °C)] 97.6 °F (36.4 °C)  Pulse:  [74-94] 77  Resp:  [14-20] 18  SpO2:  [87 %-100 %] 87 %  BP: (102-182)/() 109/59     Weight: 56.7 kg (125 lb) (03/20/23 2147)  Body mass index is 22.86 kg/m².  Body surface area is 1.57 meters squared.    I/O last 3 completed shifts:  In: 175 [Blood:175]  Out: 1600 [Other:1600]    Physical Exam  Constitutional:       Appearance: Normal appearance.   HENT:      Head: Normocephalic and atraumatic.      Nose: Nose normal.      Mouth/Throat:      Mouth: Mucous membranes are moist.      Pharynx: Oropharynx is clear.   Eyes:      Conjunctiva/sclera: Conjunctivae normal.   Cardiovascular:      Rate and Rhythm: Normal rate and regular rhythm.      Comments: R IJ TDC   Pulmonary:      Effort: Pulmonary effort is normal.      Breath sounds: Normal breath sounds.   Abdominal:      General: Abdomen is flat.   Musculoskeletal:         General: Normal range of motion.      Cervical back: Normal range of motion and neck supple.   Skin:     General: Skin is warm and dry.   Neurological:      General: No focal deficit present.      Mental Status: She is alert and oriented to person, place, and time.   Psychiatric:         Mood and Affect: Mood normal.         Behavior: Behavior normal.       Significant Labs:  CBC:   Recent Labs   Lab  03/21/23  0518   WBC 5.90   RBC 2.71*   HGB 8.0*   HCT 24.9*   PLT 98*   MCV 92   MCH 29.5   MCHC 32.1       CMP:   Recent Labs   Lab 03/20/23  1136 03/21/23  0635   * 69*   CALCIUM 8.9 8.6*   ALBUMIN 3.0* 2.8*   PROT 6.7  --     136   K 3.7 3.8   CO2 24 26    104   BUN 54* 30*   CREATININE 6.0* 3.7*   ALKPHOS 398*  --    ALT 37  --    AST 31  --    BILITOT 2.6*  --        All labs within the past 24 hours have been reviewed.      Assessment/Plan:     Renal/  ESRD (end stage renal disease) on dialysis  Outpatient HD Information:    -Outpatient HD unit: [facility]   -HD tx days: T/Th/Sat  -HD tx time: 4hrs   -Last HD tx prior to presentation: 3/18/23  -HD access: R IJ TDC   -HD modality: iHD   -Residual urine: Anuric       Plan/Recommendations:    -Will keep on T/Th/Sat HD schedule   -Renal diet  -Strict I/O's and daily weights  -Daily renal function panels   -Renally dose meds      Chronic kidney disease-mineral and bone disorder  -Low phos diet   -Resume home phos binders     Oncology  * Anemia in ESRD (end-stage renal disease)  -Target Hg of 10-12  -Transfuse for Hg <7.0  -Hg of 5.6 on presentation. Anemia severe enough that is possiblly 2/2 additional causes other than anemia in setting of ESRD. Further work-up per primary team         Thank you for your consult. I will follow-up with patient. Please contact us if you have any additional questions.    Henri Montoya NP  Nephrology  [facility]

## 2023-03-21 NOTE — PROGRESS NOTES
HD tx 1:1 complete per r cvc .tolerated well     03/21/23 0514   Handoff Report   Given To primary RN   Vital Signs   Temp 98.4 °F (36.9 °C)   Temp Source Oral   Pulse 78   Resp 18   SpO2 96 %   BP (!) 140/78   During Hemodialysis Assessment   Blood Flow Rate (mL/min) 350 mL/min   Dialysate Flow Rate (mL/min) 510 ml/min   Ultrafiltration Rate (mL/Hr) 500 mL/Hr   Arteriovenous Lines Secure Yes   Arterial Pressure (mmHg) -140 mmHg   Venous Pressure (mmHg) 160   Blood Volume Processed (Liters) 50 L   UF Removed (mL) 1600 mL   TMP 50   Venous Line in Air Detector Yes   Intake (mL) 250 mL   Transducer Dry Yes   Access Visible Yes   Post-Hemodialysis Assessment   Rinseback Volume (mL) 250 mL   Blood Volume Processed (Liters) 54 L   Dialyzer Clearance Clear   Duration of Treatment 180 minutes   Additional Fluid Intake (mL) 0 mL   Total UF (mL) 1600 mL   Net Fluid Removal 1000   Patient Response to Treatment tolerated well see note   Post-Hemodialysis Comments toleratered well HD tx completed see note

## 2023-03-21 NOTE — PLAN OF CARE
CHW unable to schedule the PCP hospital follow up. I left a message on the office voicemail requesting an appointment on the patients behalf. I added this information to the AVS as a reminder for the patient.

## 2023-03-21 NOTE — NURSING
Pt arrived to room 1106. Pt [id number]. Blood infusing to R AC IV w no complications. Skin intact. Pt reports she can not see out of either eye but is able to see shadows. Denies pain. Bed locked and low, call bell in reach.

## 2023-03-21 NOTE — PLAN OF CARE
[facility]  Discharge Final Note    Primary Care Provider: AIDEN CONNER NP    Expected Discharge Date: 3/21/2023  Future Appointments   Date Time Provider Department Center   3/22/2023  7:00 AM LAB, TRANSPLANT [facility]   3/22/2023  7:00 AM DIALYSIS, [facility]   3/22/2023  8:00 AM KIDNEY, TRANSPLANT [facility] KIDNTX [facility]   3/22/2023  2:00 PM [facility] US [facility] ONLY [facility]   3/22/2023  2:45 PM [facility] US [facility]   3/22/2023  3:30 PM [facility] US [facility] ONLY [facility]   4/12/2023  8:30 AM NUCLEAR CAMERA, [facility]   4/12/2023  8:35 AM NUCLEAR CAMERA, [facility]   4/12/2023  8:40 AM NUCLEAR CAMERA, [facility]       Pt discharged home with no services.  CM attempted to schedule  Endocrinology nd Heme/Onc Clinic   referral. No appointments available in the requested time,   will send message to clinic nurse to contact pt/family with appointment. Note put in AVS.   Nura Pascal RN,BSN          Final Discharge Note (most recent)       Final Note - 03/21/23 1215          Final Note    Assessment Type Final Discharge Note     Anticipated Discharge Disposition Home or Self Care     Hospital Resources/Appts/Education Provided Provided patient/caregiver with written discharge plan information;Appointments scheduled and added to AVS        Post-Acute Status    Other Status No Post-Acute Service Needs     Discharge Delays None known at this time                     Important Message from Medicare             Contact Info       AIDEN CONNER NP   Specialty: Nurse Practitioner   Relationship: PCP - General    [facility] [id number]   Phone: [phone]       Next Steps: Follow up    Instructions: A message has been sent to your PCP and the nurse will contact you to schedule this hospital follow up visit. However, if you do not  hear from them within 24 to 48 hours of discharge please call to schedule the appointment.    Hemetology/Oncology Clinic        Next Steps: Follow up    Instructions: The Clinic Nurse will call you with an appointment. If you do not hear from them in 48 hrs, please call [phone].    Endocrinology Clinic        Next Steps: Follow up    Instructions: The Clinic Nurse will call you with an appointment. If you do not hear from them in 48 hrs, please call [phone].

## 2023-03-21 NOTE — PROGRESS NOTES
HD tx 1:1 in pt room, started per R CVC without problems.   03/21/23 0206   Required for all Hemodialysis Patients   Hepatitis Status negative   Treatment Type   Treatment Type Acute   Vital Signs   Temp 98.5 °F (36.9 °C)   Temp Source Oral   Pulse 94   Heart Rate Source Monitor   Resp 18   SpO2 100 %   Pulse Oximetry Type Intermittent   Oximetry Probe Site Intact   Flow (L/min) 2   Device (Oxygen Therapy) nasal cannula   /78   MAP (mmHg) 77   BP Location Left arm   BP Method Automatic   Patient Position Lying   Assessments (Pre/Post)   Consent Obtained yes   Date Hepatitis Profile Obtained 02/09/23   Blood Liters Processed (BLP) 0   Transport Modality not applicable   Level of Consciousness (AVPU) alert   Pain   Preferred Pain Scale number (Numeric Rating Pain Scale)   Comfort/Acceptable Pain Level 0   Pre-Hemodialysis Assessment   Additional Dialysis Information Needed Yes   Patient Status   (in pt rroom)   Treatment Consent Verified Yes   Treatment Status Started   Gross Bleach Negative Yes   Total Chlorine is less than 0.1 ppm Yes   Machine Number k35   Alarms Verified Yes   Reverse Osmosis Number 21   Reverse Osmosis Machine Log Completed Yes   Extracorporeal Circuit Tested for Integrity Yes   pH 7   Machine Temperature 96.8 °F (36 °C)   Dialyzer F-160   Machine Conductivity 14   Meter Conductivity 14   Dialysate Na (mEq/L) 138   Dialysate K (mEq/L) 3   Dialysate CA (mEq/L) 2.5   Dialysate HCO3 (mEq/L) 30   Prime Ordered (mL) 250 mL   Treatment Initiation with Dialysis Precautions All connections secured;Saline line double clamped;Venous parameters set;Arterial parameters set;Prime given;Air foam detector engaged   Prime Amount Given (mL) 250 mL   Net UF Goal 1000   Total UF Goal 1500   Pre-Hemodialysis Comments HD started   UF Rate 400   RO # / DI #  21   RO Rejection Within Limits? Yes   Timeout Performed 0200        Hemodialysis Catheter 12/09/22 right subclavian   Placement Date: 12/09/22   Location:  right subclavian   Line Necessity Review CRRT/HD   Site Assessment No redness;No drainage;No swelling   Line Securement Device Secured with sutures   Dressing Type Biopatch in place   Dressing Status Clean;Dry;Intact   Dressing Intervention Integrity maintained   Date on Dressing 03/18/23   Venous Patency/Care flushed w/o difficulty   Arterial Patency/Care flushed w/o difficulty   During Hemodialysis Assessment   Blood Flow Rate (mL/min) 200 mL/min   Dialysate Flow Rate (mL/min) 500 ml/min   Ultrafiltration Rate (mL/Hr) 400 mL/Hr   Arteriovenous Lines Secure Yes   Arterial Pressure (mmHg) -120 mmHg   Venous Pressure (mmHg) 110   Blood Volume Processed (Liters) 0 L   UF Removed (mL) 0 mL   TMP 50   Venous Line in Air Detector Yes   Intake (mL) 250 mL   Transducer Dry Yes   Access Visible Yes    notified of access issue? N/A   Heparin given? N/A   Intra-Hemodialysis Comments HD tx started 1:1 at bed side. see note

## 2023-03-21 NOTE — PLAN OF CARE
Problem: Adult Inpatient Plan of Care  Goal: Plan of Care Review  Outcome: Ongoing, Progressing  Goal: Patient-Specific Goal (Individualized)  Outcome: Ongoing, Progressing  Goal: Absence of Hospital-Acquired Illness or Injury  Outcome: Ongoing, Progressing  Goal: Optimal Comfort and Wellbeing  Outcome: Ongoing, Progressing  Goal: Readiness for Transition of Care  Outcome: Ongoing, Progressing     Problem: Anemia  Goal: Anemia Symptom Improvement  Outcome: Ongoing, Progressing     Problem: Hemodynamic Instability (Hemodialysis)  Goal: Effective Tissue Perfusion  3/21/2023 0322 by Morena Pham RN  Outcome: Ongoing, Progressing  3/21/2023 0322 by Morena Pham RN  Outcome: Ongoing, Progressing    Pt remained free from falls or injuries this shift. Pt independent in repositioning. No skin breakdown noticed. Pt rested well through the night.  HD started at bedside around 0200. Pt s/p 2u PRBC.

## 2023-03-21 NOTE — DISCHARGE SUMMARY
[facility] [address] [place] [address] Medicine  Discharge Summary      Patient Name: Tabby Howard  MRN: [id number]  UNIQUE: [id number]  Patient Class: OP- Observation  Admission Date: 3/20/2023  Hospital Length of Stay: 0 days  Discharge Date and Time:  03/21/2023 4:50 PM  Attending Physician: Leah Munson MD   Discharging Provider: Leah Munson MD  Primary Care Provider: AIDEN CONNER NP  Hospital Medicine Team: [facility] Leah Munson MD  Primary Care Team: [facility]    HPI:   Tabby Howard is a 33-year-old lady with prior history of ESRD on hemodialysis(T-Th-S),  gastroparesis, seizure, DM admitted with anemia. She is frequently hospitalized - this is her 7th admission for 2023. She was planning for retinal detachment repair with ophthalmology today when her blood glucose was found to be in the 300s + Hgb 5.6 (from baseline 8 last week). She has a known history of ESRD-associated anemia. Has no black/bloody stools, bloody emesis, trauma. She does take anticoagulation ppx for DVT. Endorses nausea, vomiting, pain - these are chronic symptoms. Initially when I asked she says these are brand new symptoms. However, in review of records she has had numerous hospitalizations in which she complains of identical pain and requests opioids. She has had numerous normal CT scans of her abdomen recently, and no organic etiology has been identified. EGD done 12/2022 normal. GI at that time felt that diagnosis was likely gastroparesis, and encouraged dc opioids. Concern for drug seeking behavior     On arrival to the ED, she was afebrile and HDS. Labs with Hgb 5.6, plt 89. In review of records, recent baseline Hgb 7.5-8, has recent history of thrombocytopenia. Diff without schistocytes. LFTs with bili at baseline 2. LA 2.6, repeat pend. BHB reassuring. Lipase normal. CT A/P with unchanged volume overload but otherwise no acute pathology identified. EKG normal sinus rhythm. She was admitted to the  hospital for further management of her anemia        * No surgery found *      Hospital Course:   * Anemia in ESRD (end-stage renal disease)  She has a known history of ESRD-associated anemia. Labs with Hgb 5.6, plt 89 on admission. In review of records, recent baseline Hgb 7.5-8, has recent history of thrombocytopenia. Automatic diff without schistocytes. LFTs with bili at baseline 2. Haptoglobin undetectable at baseline. LDH in the 400s, which appears at baseline without acute increase. She does take anticoagulation ppx for DVT, but has no black/bloody stools, bloody emesis, trauma. Acute blood loss a less likely etiology. Considering possibility of hemolysis - haptoglobin appears to be chronically undetectable and LDH chronically elevated. Do not feel that repeat hemolysis labs indicated as the original numbers were not far off of her baseline, and repeat would likely not change her clinical management given overall clinical and CBC stability. She has no clear medication etiologies, no evident source of infection as precipitant. Blood cultures sent, but not started on abx given low suspicion. Considered possibility of ESRD production defect, but uncertain as retic was elevated (inadequate, but still elevated). She received 2u pRBC in the ED. Her post-transfusion Hgb with appropriate rise, with stability on repeat check. Plt also stable/improving. PT/INR and PTT overall reassuring. Low suspicion for ongoing process given stability of vitals and hemoglobin + lack of associated symptoms. Continued on home eliquis at discharge. She was discharged home in good condition, with strict follow up and return counseling provided. Instructed her to be seen by her PCP by the end of the week for repeat lab draw + diabetes management.      Congestive heart failure with left ventricular diastolic dysfunction  Patient is identified as having Diastolic (HFpEF) heart failure that is Chronic. CHF is currently controlled. Latest ECHO  performed and demonstrates- Results for orders placed during the hospital encounter of 01/12/23     Echo     Interpretation Summary  · The left ventricle is normal in size with moderate concentric hypertrophy and normal systolic function.  · The estimated ejection fraction is 55%.  · Grade III left ventricular diastolic dysfunction.  · Normal right ventricular size with normal right ventricular systolic function.  · Moderate left atrial enlargement.  · Moderate to severe tricuspid regurgitation.  · Mild to moderate pulmonic regurgitation.  · Mild mitral regurgitation.  · Normal central venous pressure (3 mmHg).  · The estimated PA systolic pressure is 56 mmHg.  · There is pulmonary hypertension.  · Moderate to large circumferential pericardial effusion. No evidence of tamponade.  . Continue Furosemide and monitor clinical status closely. Monitor on telemetry. Patient is off CHF pathway.  Monitor strict Is&Os and daily weights.  Place on fluid restriction of 1.5 L. Continue to stress to patient importance of self efficacy and  on diet for CHF. Last BNP reviewed- and noted below No results for input(s): BNP, BNPTRIAGEBLO in the last 168 hours..     iHD for volume management         DM (diabetes mellitus)  Patient's FSGs are controlled on current medication regimen.        Hold Oral hypoglycemics while patient is in the hospital.  Had some episodes of hypoglycemia on long acting 10u + short acting 4u. Discharged home on just 5u long acting without short acting   Close PCP follow up      Drug-seeking behavior  Endorses nausea, vomiting, pain - these are chronic symptoms. Initially when I asked she says these are brand new symptoms. However, in review of records she has had numerous hospitalizations in which she complains of identical pain and requests opioids. She has had numerous normal CT scans of her abdomen recently, and no organic etiology has been identified. EGD done 12/2022 normal. GI at that time felt  that diagnosis was likely gastroparesis, and encouraged dc opioids. Concern for drug seeking behavior     Thrombocytopenia  Plt 89 on admission --> 89 on discharge   In review of recent history, this plt count has been typical for her in the recent months        Uncontrolled hypertension  Continue home medications, reviewed on admission. Improved BP        Deep vein thrombosis (DVT) of non-extremity vein  Continue home eliquis  No s/s active bleeding as above        ESRD (end stage renal disease) on dialysis  Nephrology consulted on admission  iHD TTS        Seizure disorder  Continue home keppra          Goals of Care Treatment Preferences:  Code Status: Full Code    Health care agent: Step-Mother Tanya Howard / Father Garcia Howard  Health care agent number: [phone] / [phone]          What is most important right now is to focus on remaining as independent as possible.  Accordingly, we have decided that the best plan to meet the patient's goals includes continuing with treatment.      Consults:   Consults (From admission, onward)          Status Ordering Provider     IP consult to case management  Once        Provider:  (Not yet assigned)    Acknowledged PRAVIN OSEI     Inpatient consult to Nephrology  Once        Provider:  (Not yet assigned)    Completed PRAVIN OSEI.            No new Assessment & Plan notes have been filed under this hospital service since the last note was generated.  Service: Hospital Medicine    Final Active Diagnoses:    Diagnosis Date Noted POA    PRINCIPAL PROBLEM:  Anemia in ESRD (end-stage renal disease) [N18.6, D63.1] 03/20/2023 Yes    Congestive heart failure with left ventricular diastolic dysfunction [I50.30] 03/02/2023 Yes     Chronic    DM (diabetes mellitus) [E11.9] 01/27/2023 Yes    Drug-seeking behavior [Z76.5] 01/24/2023 Yes    Thrombocytopenia [D69.6] 10/26/2022 Yes     Chronic    Uncontrolled hypertension [I10] 07/30/2022 Yes    Deep vein thrombosis  (DVT) of non-extremity vein [I82.90] 07/26/2022 Yes     Chronic    ESRD (end stage renal disease) on dialysis [N18.6, Z99.2] 07/22/2022 Not Applicable     Chronic    Seizure disorder [G40.909] 05/29/2022 Yes     Chronic      Problems Resolved During this Admission:       Discharged Condition: good    Disposition: Home or Self Care    Follow Up:   Follow-up Information       AIDEN CONNER NP Follow up.    Specialty: Nurse Practitioner  Why: A message has been sent to your PCP and the nurse will contact you to schedule this hospital follow up visit. However, if you do not hear from them within 24 to 48 hours of discharge please call to schedule the appointment.  Contact information:  Leroy Foley  [facility] [id number] [phone]               Hemetology/Oncology Clinic Follow up.    Why: The Clinic Nurse will call you with an appointment. If you do not hear from them in 48 hrs, please call [phone].             Endocrinology Clinic Follow up.    Why: The Clinic Nurse will call you with an appointment. If you do not hear from them in 48 hrs, please call [phone].                         Patient Instructions:      Ambulatory referral/consult to Endocrinology   Standing Status: Future   Referral Priority: Routine Referral Type: Consultation   Requested Specialty: Endocrinology   Number of Visits Requested: 1     Ambulatory referral/consult to Hematology / Oncology   Standing Status: Future   Referral Priority: Routine Referral Type: Consultation   Referral Reason: Specialty Services Required   Requested Specialty: Hematology and Oncology   Number of Visits Requested: 1     Diet renal     Diet diabetic     Notify your health care provider if you experience any of the following:  temperature >100.4     Notify your health care provider if you experience any of the following:  persistent nausea and vomiting or diarrhea     Notify your health care provider if you experience any of the following:  severe  uncontrolled pain     Notify your health care provider if you experience any of the following:  difficulty breathing or increased cough     Notify your health care provider if you experience any of the following:  severe persistent headache     Notify your health care provider if you experience any of the following:  worsening rash     Notify your health care provider if you experience any of the following:  persistent dizziness, light-headedness, or visual disturbances     Notify your health care provider if you experience any of the following:  increased confusion or weakness     Activity as tolerated       Significant Diagnostic Studies: Labs: All labs within the past 24 hours have been reviewed    Pending Diagnostic Studies:       Procedure Component Value Units Date/Time    Pathologist Interpretation Differential [[id number]]     Order Status: Sent Lab Status: No result     Specimen: Blood            Medications:  Reconciled Home Medications:      Medication List        CHANGE how you take these medications      amLODIPine 10 MG tablet  Commonly known as: NORVASC  Take 1 tablet every day by oral route for 30 days.  What changed:   how much to take  how to take this  when to take this     carvediloL 25 MG tablet  Commonly known as: COREG  Take 1 tablet twice a day by oral route for 30 days.  What changed:   how much to take  how to take this  when to take this     FLUoxetine 20 MG capsule  Take 1 capsule every day by oral route for 30 days.  What changed:   how much to take  how to take this  when to take this     gabapentin 100 MG capsule  Commonly known as: NEURONTIN  Take 1 capsule by mouth 3 times daily  What changed:   how much to take  how to take this  when to take this     hydrALAZINE 100 MG tablet  Commonly known as: APRESOLINE  Take 1 tablet twice a day by oral route for 30 days.  What changed:   how much to take  how to take this  when to take this     insulin detemir U-100 100 unit/mL  injection  Commonly known as: Levemir  Inject 5 Units into the skin every evening.  What changed: how much to take     isosorbide mononitrate 60 MG 24 hr tablet  Commonly known as: IMDUR  Take 1 tablet every day by oral route for 30 days.  What changed:   how much to take  when to take this     levETIRAcetam 500 MG Tab  Commonly known as: KEPPRA  Take 1 tablet twice a day by oral route for 30 days.  What changed:   how much to take  when to take this     ondansetron 4 MG Tbdl  Commonly known as: ZOFRAN-ODT  Place1 tablet on the tongue every 8 hours as needed  What changed:   how much to take  how to take this  when to take this  reasons to take this     pantoprazole 40 MG tablet  Commonly known as: PROTONIX  Take 1 tablet every day by oral route for 30 days.  What changed:   how much to take  when to take this            CONTINUE taking these medications      albuterol 90 mcg/actuation inhaler  Commonly known as: PROVENTIL/VENTOLIN HFA  Inhale 2 puffs into the lungs every 6 (six) hours as needed for Wheezing. Rescue     BINAXNOW COVID-19 AG SELF TEST Kit  Generic drug: COVID-19 antigen test  Take 1 Dose by mouth once.     cloNIDine 0.2 mg/24 hr td ptwk 0.2 mg/24 hr  Commonly known as: CATAPRES  Apply 1 patch to skin once every week.     ELIQUIS 5 mg Tab  Generic drug: apixaban  Take 1 tablet (5 mg total) by mouth 2 (two) times daily.     mirtazapine 15 MG disintegrating tablet  Commonly known as: REMERON SOL-TAB  Take 1 tablet (15 mg total) by mouth nightly.            STOP taking these medications      HYDROmorphone 4 MG tablet  Commonly known as: DILAUDID     insulin aspart U-100 100 unit/mL injection  Commonly known as: NovoLOG              Indwelling Lines/Drains at time of discharge:   Lines/Drains/Airways       Central Venous Catheter Line  Duration                  Hemodialysis Catheter 12/09/22 right subclavian 102 days                    Time spent on the discharge of patient: 35 minutes spent at beside and  on discharge planning         May AFSHAN Munson MD  Department of Hospital Medicine  [facility] 11H

## 2023-03-21 NOTE — DISCHARGE INSTRUCTIONS
Please see updated medication list for current medications you should be taking. Specifically, your insulin regimen has been changed. You should decrease the amount of long-acting insulin you use (once a day) to 5 units. You should stop taking the short-acting (mealtime) inulin. Please plan to follow up with your primary provider by the end of the week for repeat lab draw to check your blood counts. Please seek medical care with shortness of breath, black/bloody stools, black/bloody vomiting, chest pain, confusion, weakness, blood in your urine, severe fatigue, bruising, swelling, or you have any other concerns.

## 2023-03-21 NOTE — SUBJECTIVE & OBJECTIVE
Interval History: Tolerated HD yesterday, with a net UF of 1L. Hg 8.0 this morning.     Objective:     Vital Signs (Most Recent):  Temp: 97.6 °F (36.4 °C) (03/21/23 1140)  Pulse: 77 (03/21/23 1140)  Resp: 18 (03/21/23 1140)  BP: (!) 109/59 (03/21/23 1140)  SpO2: (!) 87 % (03/21/23 1140)   Vital Signs (24h Range):  Temp:  [96.4 °F (35.8 °C)-99.2 °F (37.3 °C)] 97.6 °F (36.4 °C)  Pulse:  [74-94] 77  Resp:  [14-20] 18  SpO2:  [87 %-100 %] 87 %  BP: (102-182)/() 109/59     Weight: 56.7 kg (125 lb) (03/20/23 2147)  Body mass index is 22.86 kg/m².  Body surface area is 1.57 meters squared.    I/O last 3 completed shifts:  In: 175 [Blood:175]  Out: 1600 [Other:1600]    Physical Exam  Constitutional:       Appearance: Normal appearance.   HENT:      Head: Normocephalic and atraumatic.      Nose: Nose normal.      Mouth/Throat:      Mouth: Mucous membranes are moist.      Pharynx: Oropharynx is clear.   Eyes:      Conjunctiva/sclera: Conjunctivae normal.   Cardiovascular:      Rate and Rhythm: Normal rate and regular rhythm.      Comments: R IJ TDC   Pulmonary:      Effort: Pulmonary effort is normal.      Breath sounds: Normal breath sounds.   Abdominal:      General: Abdomen is flat.   Musculoskeletal:         General: Normal range of motion.      Cervical back: Normal range of motion and neck supple.   Skin:     General: Skin is warm and dry.   Neurological:      General: No focal deficit present.      Mental Status: She is alert and oriented to person, place, and time.   Psychiatric:         Mood and Affect: Mood normal.         Behavior: Behavior normal.       Significant Labs:  CBC:   Recent Labs   Lab 03/21/23  0518   WBC 5.90   RBC 2.71*   HGB 8.0*   HCT 24.9*   PLT 98*   MCV 92   MCH 29.5   MCHC 32.1       CMP:   Recent Labs   Lab 03/20/23  1136 03/21/23  0635   * 69*   CALCIUM 8.9 8.6*   ALBUMIN 3.0* 2.8*   PROT 6.7  --     136   K 3.7 3.8   CO2 24 26    104   BUN 54* 30*   CREATININE 6.0*  3.7*   ALKPHOS 398*  --    ALT 37  --    AST 31  --    BILITOT 2.6*  --        All labs within the past 24 hours have been reviewed.

## 2023-03-21 NOTE — ASSESSMENT & PLAN NOTE
-Target Hg of 10-12  -Transfuse for Hg <7.0  -Hg of 5.6 on presentation. Anemia severe enough that is possiblly 2/2 additional causes other than anemia in setting of ESRD. Further work-up per primary team

## 2023-03-21 NOTE — ASSESSMENT & PLAN NOTE
Outpatient HD Information:    -Outpatient HD unit: [facility]   -HD tx days: T/Th/Sat  -HD tx time: 4hrs   -Last HD tx prior to presentation: 3/18/23  -HD access: R IJ TDC   -HD modality: iHD   -Residual urine: Anuric       Plan/Recommendations:    -Will keep on T/Th/Sat HD schedule   -Renal diet  -Strict I/O's and daily weights  -Daily renal function panels   -Renally dose meds

## 2023-03-25 LAB
BACTERIA BLD CULT: NORMAL
BACTERIA BLD CULT: NORMAL

## 2023-03-29 ENCOUNTER — PATIENT MESSAGE (OUTPATIENT)
Dept: HEMATOLOGY/ONCOLOGY | Facility: CLINIC | Age: 33
End: 2023-03-29

## 2023-03-29 ENCOUNTER — OFFICE VISIT (OUTPATIENT)
Dept: HEMATOLOGY/ONCOLOGY | Facility: CLINIC | Age: 33
End: 2023-03-29
Payer: MEDICAID

## 2023-03-29 VITALS
HEART RATE: 83 BPM | SYSTOLIC BLOOD PRESSURE: 143 MMHG | HEIGHT: 62 IN | WEIGHT: 123 LBS | DIASTOLIC BLOOD PRESSURE: 88 MMHG | RESPIRATION RATE: 12 BRPM | OXYGEN SATURATION: 98 % | TEMPERATURE: 98 F | BODY MASS INDEX: 22.63 KG/M2

## 2023-03-29 DIAGNOSIS — D64.9 SYMPTOMATIC ANEMIA: ICD-10-CM

## 2023-03-29 DIAGNOSIS — D69.6 THROMBOCYTOPENIA: Primary | ICD-10-CM

## 2023-03-29 PROCEDURE — 3079F PR MOST RECENT DIASTOLIC BLOOD PRESSURE 80-89 MM HG: ICD-10-PCS | Mod: CPTII,,, | Performed by: INTERNAL MEDICINE

## 2023-03-29 PROCEDURE — 3044F PR MOST RECENT HEMOGLOBIN A1C LEVEL <7.0%: ICD-10-PCS | Mod: CPTII,,, | Performed by: INTERNAL MEDICINE

## 2023-03-29 PROCEDURE — 1159F MED LIST DOCD IN RCRD: CPT | Mod: CPTII,,, | Performed by: INTERNAL MEDICINE

## 2023-03-29 PROCEDURE — 3044F HG A1C LEVEL LT 7.0%: CPT | Mod: CPTII,,, | Performed by: INTERNAL MEDICINE

## 2023-03-29 PROCEDURE — 1111F DSCHRG MED/CURRENT MED MERGE: CPT | Mod: CPTII,,, | Performed by: INTERNAL MEDICINE

## 2023-03-29 PROCEDURE — 3077F PR MOST RECENT SYSTOLIC BLOOD PRESSURE >= 140 MM HG: ICD-10-PCS | Mod: CPTII,,, | Performed by: INTERNAL MEDICINE

## 2023-03-29 PROCEDURE — 1111F PR DISCHARGE MEDS RECONCILED W/ CURRENT OUTPATIENT MED LIST: ICD-10-PCS | Mod: CPTII,,, | Performed by: INTERNAL MEDICINE

## 2023-03-29 PROCEDURE — 3008F BODY MASS INDEX DOCD: CPT | Mod: CPTII,,, | Performed by: INTERNAL MEDICINE

## 2023-03-29 PROCEDURE — 3066F NEPHROPATHY DOC TX: CPT | Mod: CPTII,,, | Performed by: INTERNAL MEDICINE

## 2023-03-29 PROCEDURE — 99214 OFFICE O/P EST MOD 30 MIN: CPT | Mod: S$PBB,,, | Performed by: INTERNAL MEDICINE

## 2023-03-29 PROCEDURE — 3066F PR DOCUMENTATION OF TREATMENT FOR NEPHROPATHY: ICD-10-PCS | Mod: CPTII,,, | Performed by: INTERNAL MEDICINE

## 2023-03-29 PROCEDURE — 99214 PR OFFICE/OUTPT VISIT, EST, LEVL IV, 30-39 MIN: ICD-10-PCS | Mod: S$PBB,,, | Performed by: INTERNAL MEDICINE

## 2023-03-29 PROCEDURE — 3077F SYST BP >= 140 MM HG: CPT | Mod: CPTII,,, | Performed by: INTERNAL MEDICINE

## 2023-03-29 PROCEDURE — 3079F DIAST BP 80-89 MM HG: CPT | Mod: CPTII,,, | Performed by: INTERNAL MEDICINE

## 2023-03-29 PROCEDURE — 99215 OFFICE O/P EST HI 40 MIN: CPT | Mod: PBBFAC,PO | Performed by: INTERNAL MEDICINE

## 2023-03-29 PROCEDURE — 3008F PR BODY MASS INDEX (BMI) DOCUMENTED: ICD-10-PCS | Mod: CPTII,,, | Performed by: INTERNAL MEDICINE

## 2023-03-29 PROCEDURE — 1159F PR MEDICATION LIST DOCUMENTED IN MEDICAL RECORD: ICD-10-PCS | Mod: CPTII,,, | Performed by: INTERNAL MEDICINE

## 2023-03-29 PROCEDURE — 99999 PR PBB SHADOW E&M-EST. PATIENT-LVL V: ICD-10-PCS | Mod: PBBFAC,,, | Performed by: INTERNAL MEDICINE

## 2023-03-29 PROCEDURE — 99999 PR PBB SHADOW E&M-EST. PATIENT-LVL V: CPT | Mod: PBBFAC,,, | Performed by: INTERNAL MEDICINE

## 2023-03-29 NOTE — Clinical Note
Blood work bone marrow biopsy ultrasound abdomen.  I will see patient in 4 weeks with everything has been resulted.

## 2023-03-29 NOTE — PROGRESS NOTES
HPI    33 years old female with history of chronic kidney disease stage 5 hemodialysis Monday Wednesday and Friday, history of sickle cell trait, diabetes type 2.      Patient was recently discharged from the hospital, it was reported that patient had frequent hospitalization.  It was 7th admission for 2023.  It was also request port that patient has multiple pain syndromes request opiates.  Numerous normal CT scan of her abdomen demonstrate no organic etiology.  There was a concern for drug-seeking behavior.    On evaluation patient has chronic anemia and chronic thrombocytopenia.  Patient does not complaining of any acute issues.    Past Medical History:   Diagnosis Date    CKD (chronic kidney disease), stage IV 4/12/2022    Diabetes mellitus due to underlying condition with unspecified complications 4/12/2022    Gastroparesis 4/12/2022    Heart failure with preserved ejection fraction 4/12/2022    EF 55% on 3/22    History of gastroesophageal reflux (GERD)     History of supraventricular tachycardia     Hyperkalemia 7/7/2022    Hypertensive emergency 7/8/2022    Sickle cell trait 4/12/2022    Type 2 diabetes mellitus      Social History     Socioeconomic History    Marital status:    Tobacco Use    Smoking status: Never    Smokeless tobacco: Never   Substance and Sexual Activity    Alcohol use: Not Currently    Drug use: No    Sexual activity: Not Currently     Partners: Male     Birth control/protection: I.U.D.     Social Determinants of Health     Financial Resource Strain: Unknown    Difficulty of Paying Living Expenses: Patient refused   Food Insecurity: Unknown    Worried About Running Out of Food in the Last Year: Patient refused    Ran Out of Food in the Last Year: Patient refused   Transportation Needs: Unknown    Lack of Transportation (Medical): Patient refused    Lack of Transportation (Non-Medical): Patient refused   Physical Activity: Unknown    Days of Exercise per Week: Patient refused     Minutes of Exercise per Session: Patient refused   Stress: Unknown    Feeling of Stress : Patient refused   Social Connections: Unknown    Frequency of Communication with Friends and Family: Patient refused    Frequency of Social Gatherings with Friends and Family: Patient refused    Attends [employer]: Patient refused    Active Member of Clubs or Organizations: Patient refused    Attends Club or Organization Meetings: Patient refused    Marital Status: Patient refused   Housing Stability: Unknown    Unable to Pay for Housing in the Last Year: Patient refused    Unstable Housing in the Last Year: Patient refused         Subjective      Review of Systems   Constitutional: Negative for appetite change, fatigue and unexpected weight change.   HENT: Negative for mouth sores.   Eyes: Negative for visual disturbance.   Respiratory: Negative for cough and shortness of breath.   Cardiovascular: Negative for chest pain.   Gastrointestinal: Negative for diarrhea.   Genitourinary: Negative for frequency.   Musculoskeletal: Negative for back pain.   Skin: Negative for rash.   Neurological: Negative for headaches.   Hematological: Negative for adenopathy.   Psychiatric/Behavioral: The patient is not nervous/anxious.   All other systems reviewed and are negative.     Objective    Physical Exam   Vitals:    03/29/23 0939   BP: (!) 143/88   Pulse: 83   Resp: 12   Temp: 98.2 °F (36.8 °C)       Constitutional: patient is oriented to person, place, and time. patient appears well-developed and well-nourished. No distress.   HENT:   Right Ear: External ear normal.   Left Ear: External ear normal.   Nose: Nose normal.   Mouth/Throat: Oropharynx is clear and moist. No oropharyngeal exudate.   Teeth, gums and lips are normal   No sinus tenderness   Palate, tongue, posterior pharynx are normal   Eyes: Conjunctivae and lids are normal.   Neck: Trachea normal and normal range of motion. No thyromegaly   Cardiovascular: Normal rate,  regular rhythm, normal heart sounds, intact distal pulses and normal pulses.   No murmur heard.   No edema, no tenderness in the extremities.   Pulmonary/Chest: Effort normal and breath sounds normal. No accessory muscle usage. patient has no wheezes..   Abdominal: Soft. Normal appearance and bowel sounds are normal. patient exhibits no distension and no mass. There is no hepatosplenomegaly. There is no tenderness.   Musculoskeletal: Normal range of motion.   Gait is normal   No clubbing, cyanosis     Lymphadenopathy:   Head (right side): No submental and no submandibular adenopathy present.   Head (left side): No submental and no submandibular adenopathy present.   patient has no cervical adenopathy.   Right: No supraclavicular adenopathy present.   Left: No supraclavicular adenopathy present.   Neurological: patient is alert and oriented to person, place, and time. patient has normal strength and normal reflexes. No sensory deficit. Gait normal.   Skin: Skin is warm, dry and intact. No bruising, no lesion and no rash noted. No cyanosis. Nails show no clubbing.   No lesions   Psychiatric: patient has a normal mood and affect. patient speech is normal and behavior is normal. Judgment normal. Cognition and memory are normal.   Vitals reviewed.     Lab Results   Component Value Date    WBC 5.90 03/21/2023    HGB 8.0 (L) 03/21/2023    HCT 24.9 (L) 03/21/2023    MCV 92 03/21/2023    PLT 98 (L) 03/21/2023       CMP  Sodium   Date Value Ref Range Status   03/21/2023 136 136 - 145 mmol/L Final     Potassium   Date Value Ref Range Status   03/21/2023 3.8 3.5 - 5.1 mmol/L Final     Chloride   Date Value Ref Range Status   03/21/2023 104 95 - 110 mmol/L Final     CO2   Date Value Ref Range Status   03/21/2023 26 23 - 29 mmol/L Final     Glucose   Date Value Ref Range Status   03/21/2023 69 (L) 70 - 110 mg/dL Final     BUN   Date Value Ref Range Status   03/21/2023 30 (H) 6 - 20 mg/dL Final     Creatinine   Date Value Ref  Range Status   03/21/2023 3.7 (H) 0.5 - 1.4 mg/dL Final     Calcium   Date Value Ref Range Status   03/21/2023 8.6 (L) 8.7 - 10.5 mg/dL Final     Total Protein   Date Value Ref Range Status   03/20/2023 6.7 6.0 - 8.4 g/dL Final     Albumin   Date Value Ref Range Status   03/21/2023 2.8 (L) 3.5 - 5.2 g/dL Final     Total Bilirubin   Date Value Ref Range Status   03/20/2023 2.6 (H) 0.1 - 1.0 mg/dL Final     Comment:     For infants and newborns, interpretation of results should be based  on gestational age, weight and in agreement with clinical  observations.    Premature Infant recommended reference ranges:  Up to 24 hours.............<8.0 mg/dL  Up to 48 hours............<12.0 mg/dL  3-5 days..................<15.0 mg/dL  6-29 days.................<15.0 mg/dL       Alkaline Phosphatase   Date Value Ref Range Status   03/20/2023 398 (H) 55 - 135 U/L Final     AST   Date Value Ref Range Status   03/20/2023 31 10 - 40 U/L Final     ALT   Date Value Ref Range Status   03/20/2023 37 10 - 44 U/L Final     Anion Gap   Date Value Ref Range Status   03/21/2023 6 (L) 8 - 16 mmol/L Final     eGFR   Date Value Ref Range Status   03/21/2023 15.8 (A) >60 mL/min/1.73 m^2 Final         Assessment    Anemia and thrombocytopenia    Severe renal insufficiency with GFR of 15 and creatinine of 3.7    Patient is on hemodialysis 3 times a week.    Prior history need of transfusion.    Patient states that she is renal transplant candidate    Sickle cell trait    History of upper extremity vein DVT on the left side diagnosed on 07/24/2022.  Patient is on Eliquis.    > suspect anemia chronic kidney disease.  > patient nephrologist.  Patient states that she is a candidate for renal transplant.  We will follow nephrologist.  We will check iron studies and consider EPO injection  > ultrasound abdomen evaluation of liver and spleen.  > at this point I will also order bone marrow biopsy make sure patient's bone marrow architecture is intact.   SPEP TRAVON and free light chain will also order in the facilitation of workup.  > I will see patient in 2-4 weeks after everything has been resulted.  > hemoglobin electrophoresis  > with most recent platelets and hemoglobin level patient can continue on Eliquis for now.  However Eliquis has to be on hold 2 days prior to bone marrow biopsy and may resume after completion of bone marrow biopsy  > I will see her in 2-4 weeks       Plan    Symptomatic anemia  -     Ambulatory referral/consult to Hematology / Oncology

## 2023-04-04 NOTE — ADDENDUM NOTE
Addendum  created 04/04/23 1155 by Gianluca Patel MD    Attestation recorded in Intraprocedure, Intraprocedure Attestations filed

## 2023-04-05 ENCOUNTER — HOSPITAL ENCOUNTER (EMERGENCY)
Facility: HOSPITAL | Age: 33
Discharge: HOME | End: 2023-04-05
Attending: EMERGENCY MEDICINE
Payer: MEDICAID

## 2023-04-05 VITALS
HEIGHT: 62 IN | OXYGEN SATURATION: 94 % | BODY MASS INDEX: 23.74 KG/M2 | TEMPERATURE: 98 F | WEIGHT: 129 LBS | HEART RATE: 85 BPM | DIASTOLIC BLOOD PRESSURE: 115 MMHG | RESPIRATION RATE: 14 BRPM | SYSTOLIC BLOOD PRESSURE: 195 MMHG

## 2023-04-05 DIAGNOSIS — R07.89 CHEST DISCOMFORT: ICD-10-CM

## 2023-04-05 DIAGNOSIS — R10.9 ABDOMINAL PAIN, UNSPECIFIED ABDOMINAL LOCATION: Primary | ICD-10-CM

## 2023-04-05 LAB
ALBUMIN SERPL BCP-MCNC: 3.4 G/DL (ref 3.5–5.2)
ALP SERPL-CCNC: 536 U/L (ref 55–135)
ALT SERPL W/O P-5'-P-CCNC: 83 U/L (ref 10–44)
ANION GAP SERPL CALC-SCNC: 17 MMOL/L (ref 8–16)
AST SERPL-CCNC: 67 U/L (ref 10–40)
BASOPHILS # BLD AUTO: 0.02 K/UL (ref 0–0.2)
BASOPHILS NFR BLD: 0.5 % (ref 0–1.9)
BILIRUB SERPL-MCNC: 2.6 MG/DL (ref 0.1–1)
BUN SERPL-MCNC: 29 MG/DL (ref 6–20)
CALCIUM SERPL-MCNC: 9.5 MG/DL (ref 8.7–10.5)
CHLORIDE SERPL-SCNC: 96 MMOL/L (ref 95–110)
CO2 SERPL-SCNC: 25 MMOL/L (ref 23–29)
CREAT SERPL-MCNC: 4.4 MG/DL (ref 0.5–1.4)
DIFFERENTIAL METHOD: ABNORMAL
EOSINOPHIL # BLD AUTO: 0 K/UL (ref 0–0.5)
EOSINOPHIL NFR BLD: 0.7 % (ref 0–8)
ERYTHROCYTE [DISTWIDTH] IN BLOOD BY AUTOMATED COUNT: 15.2 % (ref 11.5–14.5)
EST. GFR  (NO RACE VARIABLE): 12.8 ML/MIN/1.73 M^2
GLUCOSE SERPL-MCNC: 365 MG/DL (ref 70–110)
HCT VFR BLD AUTO: 25.3 % (ref 37–48.5)
HGB BLD-MCNC: 8.4 G/DL (ref 12–16)
IMM GRANULOCYTES # BLD AUTO: 0.02 K/UL (ref 0–0.04)
IMM GRANULOCYTES NFR BLD AUTO: 0.5 % (ref 0–0.5)
LIPASE SERPL-CCNC: 24 U/L (ref 4–60)
LYMPHOCYTES # BLD AUTO: 0.4 K/UL (ref 1–4.8)
LYMPHOCYTES NFR BLD: 9.5 % (ref 18–48)
MCH RBC QN AUTO: 29.6 PG (ref 27–31)
MCHC RBC AUTO-ENTMCNC: 33.2 G/DL (ref 32–36)
MCV RBC AUTO: 89 FL (ref 82–98)
MONOCYTES # BLD AUTO: 0.3 K/UL (ref 0.3–1)
MONOCYTES NFR BLD: 6.9 % (ref 4–15)
NEUTROPHILS # BLD AUTO: 3.4 K/UL (ref 1.8–7.7)
NEUTROPHILS NFR BLD: 81.9 % (ref 38–73)
NRBC BLD-RTO: 0 /100 WBC
PLATELET # BLD AUTO: 95 K/UL (ref 150–450)
PMV BLD AUTO: 11.1 FL (ref 9.2–12.9)
POCT GLUCOSE: 351 MG/DL (ref 70–110)
POTASSIUM SERPL-SCNC: 3.8 MMOL/L (ref 3.5–5.1)
PROT SERPL-MCNC: 7.7 G/DL (ref 6–8.4)
RBC # BLD AUTO: 2.84 M/UL (ref 4–5.4)
SODIUM SERPL-SCNC: 138 MMOL/L (ref 136–145)
WBC # BLD AUTO: 4.2 K/UL (ref 3.9–12.7)

## 2023-04-05 PROCEDURE — 96375 TX/PRO/DX INJ NEW DRUG ADDON: CPT

## 2023-04-05 PROCEDURE — 80053 COMPREHEN METABOLIC PANEL: CPT | Mod: NTX | Performed by: EMERGENCY MEDICINE

## 2023-04-05 PROCEDURE — 99284 EMERGENCY DEPT VISIT MOD MDM: CPT | Mod: 25

## 2023-04-05 PROCEDURE — 63600175 PHARM REV CODE 636 W HCPCS: Mod: NTX | Performed by: EMERGENCY MEDICINE

## 2023-04-05 PROCEDURE — 93010 ELECTROCARDIOGRAM REPORT: CPT | Mod: NTX,,, | Performed by: INTERNAL MEDICINE

## 2023-04-05 PROCEDURE — 93005 ELECTROCARDIOGRAM TRACING: CPT | Mod: NTX

## 2023-04-05 PROCEDURE — 82962 GLUCOSE BLOOD TEST: CPT

## 2023-04-05 PROCEDURE — 93010 EKG 12-LEAD: ICD-10-PCS | Mod: NTX,,, | Performed by: INTERNAL MEDICINE

## 2023-04-05 PROCEDURE — 85025 COMPLETE CBC W/AUTO DIFF WBC: CPT | Mod: NTX | Performed by: EMERGENCY MEDICINE

## 2023-04-05 PROCEDURE — 25000003 PHARM REV CODE 250: Mod: NTX | Performed by: EMERGENCY MEDICINE

## 2023-04-05 PROCEDURE — 96374 THER/PROPH/DIAG INJ IV PUSH: CPT | Mod: 59

## 2023-04-05 PROCEDURE — 83690 ASSAY OF LIPASE: CPT | Mod: NTX | Performed by: EMERGENCY MEDICINE

## 2023-04-05 PROCEDURE — 96376 TX/PRO/DX INJ SAME DRUG ADON: CPT

## 2023-04-05 RX ORDER — MORPHINE SULFATE 2 MG/ML
4 INJECTION, SOLUTION INTRAMUSCULAR; INTRAVENOUS
Status: COMPLETED | OUTPATIENT
Start: 2023-04-05 | End: 2023-04-05

## 2023-04-05 RX ORDER — HYDROCODONE BITARTRATE AND ACETAMINOPHEN 5; 325 MG/1; MG/1
1 TABLET ORAL EVERY 6 HOURS PRN
Qty: 12 TABLET | Refills: 0 | Status: SHIPPED | OUTPATIENT
Start: 2023-04-05 | End: 2023-04-17 | Stop reason: CLARIF

## 2023-04-05 RX ORDER — HYDROMORPHONE HYDROCHLORIDE 1 MG/ML
0.5 INJECTION, SOLUTION INTRAMUSCULAR; INTRAVENOUS; SUBCUTANEOUS
Status: COMPLETED | OUTPATIENT
Start: 2023-04-05 | End: 2023-04-05

## 2023-04-05 RX ORDER — ONDANSETRON 2 MG/ML
4 INJECTION INTRAMUSCULAR; INTRAVENOUS
Status: COMPLETED | OUTPATIENT
Start: 2023-04-05 | End: 2023-04-05

## 2023-04-05 RX ORDER — CLONIDINE HYDROCHLORIDE 0.1 MG/1
0.2 TABLET ORAL
Status: COMPLETED | OUTPATIENT
Start: 2023-04-05 | End: 2023-04-05

## 2023-04-05 RX ORDER — ONDANSETRON 4 MG/1
4 TABLET, FILM COATED ORAL EVERY 6 HOURS PRN
Qty: 15 TABLET | Refills: 0 | Status: ON HOLD | OUTPATIENT
Start: 2023-04-05 | End: 2023-04-14 | Stop reason: HOSPADM

## 2023-04-05 RX ADMIN — CLONIDINE HYDROCHLORIDE 0.2 MG: 0.1 TABLET ORAL at 02:04

## 2023-04-05 RX ADMIN — MORPHINE SULFATE 4 MG: 2 INJECTION, SOLUTION INTRAMUSCULAR; INTRAVENOUS at 10:04

## 2023-04-05 RX ADMIN — HYDROMORPHONE HYDROCHLORIDE 0.5 MG: 1 INJECTION, SOLUTION INTRAMUSCULAR; INTRAVENOUS; SUBCUTANEOUS at 11:04

## 2023-04-05 RX ADMIN — HYDROMORPHONE HYDROCHLORIDE 0.5 MG: 1 INJECTION, SOLUTION INTRAMUSCULAR; INTRAVENOUS; SUBCUTANEOUS at 02:04

## 2023-04-05 RX ADMIN — ONDANSETRON 4 MG: 2 INJECTION INTRAMUSCULAR; INTRAVENOUS at 10:04

## 2023-04-05 NOTE — ED PROVIDER NOTES
Encounter Date: 2023       History     Chief Complaint   Patient presents with    Abdominal Pain     Abdominal pain with associated chest pain that started this morning. T, R, Sat dialysis pt     33-year-old female with end-stage kidney disease on dialysis who also has diabetes, gastroparesis, heart failure with an EF of approximately 55% on , GERD, supraventricular tachycardia, hyperkalemia, hypertension, sickle cell trait and intermittent abdominal pain comes emergency room with complaints of intermittent abdominal pain radiating to her chest which started last night.  The pain is 10/10 at this time.  The patient is moaning in pain.  She is writhing in pain.  States that her pain is dull and crampy in nature.  Diffusely located throughout the abdomen but primarily in the epigastric region radiating to her back.    Review of patient's allergies indicates:   Allergen Reactions    Penicillins Hives     Past Medical History:   Diagnosis Date    CKD (chronic kidney disease), stage IV 2022    Diabetes mellitus due to underlying condition with unspecified complications 2022    Gastroparesis 2022    Heart failure with preserved ejection fraction 2022    EF 55% on 3/22    History of gastroesophageal reflux (GERD)     History of supraventricular tachycardia     Hyperkalemia 2022    Hypertensive emergency 2022    Sickle cell trait 2022    Type 2 diabetes mellitus      Past Surgical History:   Procedure Laterality Date     SECTION      x 3    COLONOSCOPY      COLONOSCOPY N/A 2022    Procedure: COLONOSCOPY;  Surgeon: Jagdeep Cedeno MD;  Location: [facility];  Service: Endoscopy;  Laterality: N/A;    ESOPHAGOGASTRODUODENOSCOPY N/A 10/18/2019    Procedure: ESOPHAGOGASTRODUODENOSCOPY (EGD);  Surgeon: Gianluca Mendez MD;  Location: [facility];  Service: Endoscopy;  Laterality: N/A;    ESOPHAGOGASTRODUODENOSCOPY N/A 2022    Procedure: EGD (ESOPHAGOGASTRODUODENOSCOPY);   Surgeon: Micky Paredes III, MD;  Location: [facility] END[facility];  Service: Endoscopy;  Laterality: N/A;    ESOPHAGOGASTRODUODENOSCOPY N/A 12/5/2022    Procedure: EGD (ESOPHAGOGASTRODUODENOSCOPY);  Surgeon: Marcelo Zhong MD;  Location: [facility] ENDO;  Service: Endoscopy;  Laterality: N/A;    LAPAROSCOPIC CHOLECYSTECTOMY N/A 07/30/2022    Procedure: CHOLECYSTECTOMY, LAPAROSCOPIC;  Surgeon: Grey Perez MD;  Location: [facility] OR;  Service: General;  Laterality: N/A;    PLACEMENT OF DUAL-LUMEN VASCULAR CATHETER Left 07/12/2022    Procedure: INSERTION-CATHETER-JOSEPH;  Surgeon: Dionte Gan MD;  Location: [facility] OR;  Service: General;  Laterality: Left;    PLACEMENT OF DUAL-LUMEN VASCULAR CATHETER Right 07/26/2022    Procedure: INSERTION-CATHETER-Hemosplit;  Surgeon: Dionte Gan MD;  Location: [facility] OR;  Service: General;  Laterality: Right;     Family History   Problem Relation Age of Onset    Diabetes Mother     Diabetes Father      Social History     Tobacco Use    Smoking status: Never    Smokeless tobacco: Never   Substance Use Topics    Alcohol use: Not Currently    Drug use: No     Review of Systems   Constitutional:  Negative for fever.   HENT: Negative.  Negative for sore throat.    Respiratory:  Negative for apnea, chest tightness and shortness of breath.    Cardiovascular:  Negative for chest pain.   Gastrointestinal: Negative.  Negative for nausea.   Genitourinary:  Negative for dysuria.   Musculoskeletal: Negative.  Negative for back pain.   Skin:  Negative for rash.   Neurological:  Negative for weakness.   Hematological:  Does not bruise/bleed easily.   All other systems reviewed and are negative.    Physical Exam     Initial Vitals   BP Pulse Resp Temp SpO2   04/05/23 0956 04/05/23 0954 04/05/23 0954 04/05/23 0952 04/05/23 0954   (!) 202/149 90 20 97.6 °F (36.4 °C) 98 %      MAP       --                Physical Exam    Nursing note and vitals reviewed.  Constitutional: She appears well-developed and  well-nourished.   HENT:   Head: Normocephalic and atraumatic.   Eyes: EOM are normal. Pupils are equal, round, and reactive to light.   Neck: Neck supple.   Normal range of motion.  Cardiovascular:  Normal rate, regular rhythm and normal heart sounds.           Pulmonary/Chest:   Vascular access catheter right upper anterior   Abdominal: Abdomen is soft. Bowel sounds are normal. She exhibits no distension and no mass. There is abdominal tenderness. There is no rebound and no guarding.   Musculoskeletal:      Cervical back: Normal range of motion and neck supple.     Neurological: She is alert and oriented to person, place, and time. She has normal strength. GCS score is 15. GCS eye subscore is 4. GCS verbal subscore is 5. GCS motor subscore is 6.   Skin: Skin is warm.   Psychiatric: She has a normal mood and affect. Thought content normal.       ED Course   Procedures  Labs Reviewed   CBC W/ AUTO DIFFERENTIAL - Abnormal; Notable for the following components:       Result Value    RBC 2.84 (*)     Hemoglobin 8.4 (*)     Hematocrit 25.3 (*)     RDW 15.2 (*)     Platelets 95 (*)     Lymph # 0.4 (*)     Gran % 81.9 (*)     Lymph % 9.5 (*)     All other components within normal limits   COMPREHENSIVE METABOLIC PANEL - Abnormal; Notable for the following components:    Glucose 365 (*)     BUN 29 (*)     Creatinine 4.4 (*)     Albumin 3.4 (*)     Total Bilirubin 2.6 (*)     Alkaline Phosphatase 536 (*)     AST 67 (*)     ALT 83 (*)     Anion Gap 17 (*)     eGFR 12.8 (*)     All other components within normal limits   POCT GLUCOSE - Abnormal; Notable for the following components:    POCT Glucose 351 (*)     All other components within normal limits   LIPASE   URINALYSIS, REFLEX TO URINE CULTURE     EKG Readings: (Independently Interpreted)   Rate 93, normal sinus rhythm, left axis deviation, normal QRS, prolonged QTC of 504 milliseconds, abnormal EKG.     Imaging Results    None          Medications   HYDROmorphone  injection 0.5 mg (has no administration in time range)   morphine injection 4 mg (4 mg Intravenous Given 4/5/23 1021)   ondansetron injection 4 mg (4 mg Intravenous Given 4/5/23 1018)   HYDROmorphone injection 0.5 mg (0.5 mg Intravenous Given 4/5/23 1152)     Medical Decision Making:   Differential Diagnosis:   MI, STEMI, anxiety reaction, costochondritis, PE, pneumothorax, angina, cancer, fracture    ED Management:  The patient is stable this time.  After a dose of morphine she finally stop screaming loudly.  Now she is to a mild bone.  I am hesitant to give the patient Dilaudid as review of her chart demonstrates that she has drug-seeking behavior.  Her pain is better.  Vital signs are stable at this time.  The morphine seems to have worked.  Dilaudid also is renally cleared as the patient has little to no renal function I am concerned about an elevated Dilaudid level.    Laboratory values are consistent with end-stage renal disease.  Otherwise unremarkable.  The patient has had some relief with the pain medications.  She did call for Dilaudid by name was requesting that as well.  I gave her 0.5 due to consideration of it being a renally excreted medication and that she has very little if any renal function.  This has been some time ago.  I want to give her another 0.5 mg.  Observed for some time and then discharge her to home.  I will have a very short brief discussion with the patient of what appears to be possibly drug-seeking behavior however this will not be accusatory will be strictly in conversation.                        Clinical Impression:   Final diagnoses:  [R07.89] Chest discomfort  [R10.9] Abdominal pain, unspecified abdominal location (Primary)        ED Disposition Condition    Discharge Stable          ED Prescriptions       Medication Sig Dispense Start Date End Date Auth. Provider    HYDROcodone-acetaminophen (NORCO) 5-325 mg per tablet Take 1 tablet by mouth every 6 (six) hours as needed for  Pain. 12 tablet 4/5/2023 -- Jagdeep Hoffman MD    ondansetron (ZOFRAN) 4 MG tablet Take 1 tablet (4 mg total) by mouth every 6 (six) hours as needed. 15 tablet 4/5/2023 -- Jagdeep Hoffman MD          Follow-up Information    None          Jagdeep Hoffman MD  04/05/23 5631

## 2023-04-08 ENCOUNTER — HOSPITAL ENCOUNTER (EMERGENCY)
Facility: HOSPITAL | Age: 33
Discharge: HOME | End: 2023-04-09
Attending: EMERGENCY MEDICINE
Payer: MEDICAID

## 2023-04-08 DIAGNOSIS — K31.84 GASTROPARESIS: ICD-10-CM

## 2023-04-08 DIAGNOSIS — R12 HEARTBURN: ICD-10-CM

## 2023-04-08 DIAGNOSIS — E87.6 HYPOKALEMIA: ICD-10-CM

## 2023-04-08 DIAGNOSIS — R10.84 GENERALIZED ABDOMINAL PAIN: Primary | ICD-10-CM

## 2023-04-08 LAB
ALBUMIN SERPL BCP-MCNC: 3.3 G/DL (ref 3.5–5.2)
ALP SERPL-CCNC: 439 U/L (ref 55–135)
ALT SERPL W/O P-5'-P-CCNC: 60 U/L (ref 10–44)
ANION GAP SERPL CALC-SCNC: 15 MMOL/L (ref 8–16)
AST SERPL-CCNC: 37 U/L (ref 10–40)
BASOPHILS # BLD AUTO: 0.03 K/UL (ref 0–0.2)
BASOPHILS NFR BLD: 0.5 % (ref 0–1.9)
BILIRUB SERPL-MCNC: 2.1 MG/DL (ref 0.1–1)
BNP SERPL-MCNC: 1708 PG/ML (ref 0–99)
BUN SERPL-MCNC: 13 MG/DL (ref 6–20)
CALCIUM SERPL-MCNC: 8.4 MG/DL (ref 8.7–10.5)
CHLORIDE SERPL-SCNC: 96 MMOL/L (ref 95–110)
CO2 SERPL-SCNC: 25 MMOL/L (ref 23–29)
CREAT SERPL-MCNC: 3.1 MG/DL (ref 0.5–1.4)
DIFFERENTIAL METHOD: ABNORMAL
EOSINOPHIL # BLD AUTO: 0.1 K/UL (ref 0–0.5)
EOSINOPHIL NFR BLD: 1.1 % (ref 0–8)
ERYTHROCYTE [DISTWIDTH] IN BLOOD BY AUTOMATED COUNT: 18.1 % (ref 11.5–14.5)
EST. GFR  (NO RACE VARIABLE): 19.5 ML/MIN/1.73 M^2
GLUCOSE SERPL-MCNC: 402 MG/DL (ref 70–110)
HCT VFR BLD AUTO: 26.4 % (ref 37–48.5)
HGB BLD-MCNC: 8.8 G/DL (ref 12–16)
IMM GRANULOCYTES # BLD AUTO: 0.05 K/UL (ref 0–0.04)
IMM GRANULOCYTES NFR BLD AUTO: 0.9 % (ref 0–0.5)
LIPASE SERPL-CCNC: 36 U/L (ref 4–60)
LYMPHOCYTES # BLD AUTO: 0.6 K/UL (ref 1–4.8)
LYMPHOCYTES NFR BLD: 11.3 % (ref 18–48)
MCH RBC QN AUTO: 29.6 PG (ref 27–31)
MCHC RBC AUTO-ENTMCNC: 33.3 G/DL (ref 32–36)
MCV RBC AUTO: 89 FL (ref 82–98)
MONOCYTES # BLD AUTO: 0.6 K/UL (ref 0.3–1)
MONOCYTES NFR BLD: 10.2 % (ref 4–15)
NEUTROPHILS # BLD AUTO: 4.2 K/UL (ref 1.8–7.7)
NEUTROPHILS NFR BLD: 76 % (ref 38–73)
NRBC BLD-RTO: 2 /100 WBC
PLATELET # BLD AUTO: 101 K/UL (ref 150–450)
PMV BLD AUTO: 9.6 FL (ref 9.2–12.9)
POTASSIUM SERPL-SCNC: 3.1 MMOL/L (ref 3.5–5.1)
PROT SERPL-MCNC: 7.4 G/DL (ref 6–8.4)
RBC # BLD AUTO: 2.97 M/UL (ref 4–5.4)
SODIUM SERPL-SCNC: 136 MMOL/L (ref 136–145)
WBC # BLD AUTO: 5.5 K/UL (ref 3.9–12.7)

## 2023-04-08 PROCEDURE — 85025 COMPLETE CBC W/AUTO DIFF WBC: CPT | Mod: NTX | Performed by: EMERGENCY MEDICINE

## 2023-04-08 PROCEDURE — 80053 COMPREHEN METABOLIC PANEL: CPT | Mod: NTX | Performed by: EMERGENCY MEDICINE

## 2023-04-08 PROCEDURE — 93010 ELECTROCARDIOGRAM REPORT: CPT | Mod: NTX,,, | Performed by: INTERNAL MEDICINE

## 2023-04-08 PROCEDURE — 93005 ELECTROCARDIOGRAM TRACING: CPT | Mod: NTX

## 2023-04-08 PROCEDURE — 74176 CT ABDOMEN PELVIS WITHOUT CONTRAST: ICD-10-PCS | Mod: 26,NTX,, | Performed by: RADIOLOGY

## 2023-04-08 PROCEDURE — 71045 X-RAY EXAM CHEST 1 VIEW: CPT | Mod: TC,NTX

## 2023-04-08 PROCEDURE — 63600175 PHARM REV CODE 636 W HCPCS: Mod: NTX | Performed by: EMERGENCY MEDICINE

## 2023-04-08 PROCEDURE — 96374 THER/PROPH/DIAG INJ IV PUSH: CPT

## 2023-04-08 PROCEDURE — 83880 ASSAY OF NATRIURETIC PEPTIDE: CPT | Mod: NTX | Performed by: EMERGENCY MEDICINE

## 2023-04-08 PROCEDURE — 74176 CT ABD & PELVIS W/O CONTRAST: CPT | Mod: 26,NTX,, | Performed by: RADIOLOGY

## 2023-04-08 PROCEDURE — 74176 CT ABD & PELVIS W/O CONTRAST: CPT | Mod: TC,NTX

## 2023-04-08 PROCEDURE — 96375 TX/PRO/DX INJ NEW DRUG ADDON: CPT

## 2023-04-08 PROCEDURE — 25000003 PHARM REV CODE 250: Mod: NTX | Performed by: EMERGENCY MEDICINE

## 2023-04-08 PROCEDURE — 83690 ASSAY OF LIPASE: CPT | Mod: NTX | Performed by: EMERGENCY MEDICINE

## 2023-04-08 PROCEDURE — 71045 X-RAY EXAM CHEST 1 VIEW: CPT | Mod: 26,NTX,, | Performed by: RADIOLOGY

## 2023-04-08 PROCEDURE — 99285 EMERGENCY DEPT VISIT HI MDM: CPT | Mod: 25

## 2023-04-08 PROCEDURE — 87389 HIV-1 AG W/HIV-1&-2 AB AG IA: CPT | Mod: NTX | Performed by: EMERGENCY MEDICINE

## 2023-04-08 PROCEDURE — 71045 XR CHEST AP PORTABLE: ICD-10-PCS | Mod: 26,NTX,, | Performed by: RADIOLOGY

## 2023-04-08 PROCEDURE — 93010 EKG 12-LEAD: ICD-10-PCS | Mod: NTX,,, | Performed by: INTERNAL MEDICINE

## 2023-04-08 RX ORDER — ONDANSETRON 2 MG/ML
8 INJECTION INTRAMUSCULAR; INTRAVENOUS
Status: COMPLETED | OUTPATIENT
Start: 2023-04-08 | End: 2023-04-08

## 2023-04-08 RX ORDER — METOCLOPRAMIDE HYDROCHLORIDE 5 MG/ML
10 INJECTION INTRAMUSCULAR; INTRAVENOUS
Status: COMPLETED | OUTPATIENT
Start: 2023-04-08 | End: 2023-04-08

## 2023-04-08 RX ORDER — HYDROMORPHONE HYDROCHLORIDE 1 MG/ML
1 INJECTION, SOLUTION INTRAMUSCULAR; INTRAVENOUS; SUBCUTANEOUS
Status: COMPLETED | OUTPATIENT
Start: 2023-04-08 | End: 2023-04-08

## 2023-04-08 RX ORDER — POTASSIUM CHLORIDE 20 MEQ/1
40 TABLET, EXTENDED RELEASE ORAL
Status: COMPLETED | OUTPATIENT
Start: 2023-04-08 | End: 2023-04-08

## 2023-04-08 RX ADMIN — POTASSIUM CHLORIDE 40 MEQ: 1500 TABLET, EXTENDED RELEASE ORAL at 11:04

## 2023-04-08 RX ADMIN — HYDROMORPHONE HYDROCHLORIDE 1 MG: 1 INJECTION, SOLUTION INTRAMUSCULAR; INTRAVENOUS; SUBCUTANEOUS at 10:04

## 2023-04-08 RX ADMIN — METOCLOPRAMIDE 10 MG: 5 INJECTION, SOLUTION INTRAMUSCULAR; INTRAVENOUS at 11:04

## 2023-04-08 RX ADMIN — ONDANSETRON HYDROCHLORIDE 8 MG: 2 SOLUTION INTRAMUSCULAR; INTRAVENOUS at 10:04

## 2023-04-09 VITALS
TEMPERATURE: 99 F | BODY MASS INDEX: 23.74 KG/M2 | DIASTOLIC BLOOD PRESSURE: 93 MMHG | SYSTOLIC BLOOD PRESSURE: 156 MMHG | RESPIRATION RATE: 16 BRPM | HEIGHT: 62 IN | HEART RATE: 86 BPM | WEIGHT: 129 LBS | OXYGEN SATURATION: 98 %

## 2023-04-09 LAB — HIV 1+2 AB+HIV1 P24 AG SERPL QL IA: NORMAL

## 2023-04-09 PROCEDURE — 25000003 PHARM REV CODE 250: Mod: NTX | Performed by: EMERGENCY MEDICINE

## 2023-04-09 RX ORDER — HYDROCODONE BITARTRATE AND ACETAMINOPHEN 10; 325 MG/1; MG/1
1 TABLET ORAL EVERY 6 HOURS PRN
Qty: 20 TABLET | Refills: 0 | Status: SHIPPED | OUTPATIENT
Start: 2023-04-09 | End: 2023-04-17 | Stop reason: CLARIF

## 2023-04-09 RX ORDER — ONDANSETRON 4 MG/1
4 TABLET, ORALLY DISINTEGRATING ORAL EVERY 6 HOURS PRN
Qty: 20 TABLET | Refills: 0 | Status: ON HOLD | OUTPATIENT
Start: 2023-04-09 | End: 2023-05-17 | Stop reason: HOSPADM

## 2023-04-09 RX ORDER — HYDROCODONE BITARTRATE AND ACETAMINOPHEN 10; 325 MG/1; MG/1
1 TABLET ORAL
Status: COMPLETED | OUTPATIENT
Start: 2023-04-09 | End: 2023-04-09

## 2023-04-09 RX ADMIN — HYDROCODONE BITARTRATE AND ACETAMINOPHEN 1 TABLET: 10; 325 TABLET ORAL at 12:04

## 2023-04-09 NOTE — PROGRESS NOTES
GLUCOSE > 500 with Abdominal pain, N/V.   Concerns for DKA.       PT NOT SEEN AND REFERRED TO ED.     NO LOS

## 2023-04-09 NOTE — ED PROVIDER NOTES
"Encounter Date: 4/8/2023       History     Chief Complaint   Patient presents with    Abdominal Pain     Lower abdominal pain, diarrhea, nausea and vomiting onset today. Unable to keep PO down. Abdominal pain radiating to chest. History of chf.    Back Pain     Patient states that her "whole back" is hurting. Patient swaying from side to side in wheelchair. Evaluated on 4/5/23 for abdominal pain radiating to chest. States this pain is different. Patient is a dialysis patient with ESRD. Dialysis is Tues, Thurs and Saturday. States that she went to dialysis today.     Pt with hx of ESRD on HD TTS here with severe abd pain with NVD onset this am after dialysis. She has pain in her right flank as well. She does not make urine and has no hx of renal stones. Pain in entire abd. 10/10. 1 watery stool pta. Pain in abd rads into chest as well. She reports hx of same but is not sure what causes it.     The history is provided by the patient.   Abdominal Pain  The current episode started several hours ago. The onset of the illness was gradual. The problem has been gradually worsening. The abdominal pain is generalized. The abdominal pain radiates to the chest and back. The severity of the abdominal pain is 10/10. The abdominal pain is relieved by nothing. The other symptoms of the illness include nausea, vomiting and diarrhea. The other symptoms of the illness do not include fever, fatigue, jaundice, melena, shortness of breath, dysuria, hematemesis, hematochezia, vaginal discharge or vaginal bleeding.   The diarrhea is watery.   The emesis contains stomach contents.   Additional symptoms associated with the illness include heartburn and back pain. Symptoms associated with the illness do not include chills, anorexia, diaphoresis, constipation, urgency, hematuria or frequency.   Review of patient's allergies indicates:   Allergen Reactions    Penicillins Hives     Past Medical History:   Diagnosis Date    CKD (chronic kidney " disease), stage IV 2022    Diabetes mellitus due to underlying condition with unspecified complications 2022    Gastroparesis 2022    Heart failure with preserved ejection fraction 2022    EF 55% on 3/22    History of gastroesophageal reflux (GERD)     History of supraventricular tachycardia     Hyperkalemia 2022    Hypertensive emergency 2022    Sickle cell trait 2022    Type 2 diabetes mellitus      Past Surgical History:   Procedure Laterality Date     SECTION      x 3    COLONOSCOPY      COLONOSCOPY N/A 2022    Procedure: COLONOSCOPY;  Surgeon: Jagdeep Cedeno MD;  Location: [facility];  Service: Endoscopy;  Laterality: N/A;    ESOPHAGOGASTRODUODENOSCOPY N/A 10/18/2019    Procedure: ESOPHAGOGASTRODUODENOSCOPY (EGD);  Surgeon: Gianluca Mendez MD;  Location: [facility];  Service: Endoscopy;  Laterality: N/A;    ESOPHAGOGASTRODUODENOSCOPY N/A 2022    Procedure: EGD (ESOPHAGOGASTRODUODENOSCOPY);  Surgeon: Micky Paredes III, MD;  Location: [facility];  Service: Endoscopy;  Laterality: N/A;    ESOPHAGOGASTRODUODENOSCOPY N/A 2022    Procedure: EGD (ESOPHAGOGASTRODUODENOSCOPY);  Surgeon: Marcelo Zhong MD;  Location: [facility];  Service: Endoscopy;  Laterality: N/A;    LAPAROSCOPIC CHOLECYSTECTOMY N/A 2022    Procedure: CHOLECYSTECTOMY, LAPAROSCOPIC;  Surgeon: Grey Perez MD;  Location: [facility];  Service: General;  Laterality: N/A;    PLACEMENT OF DUAL-LUMEN VASCULAR CATHETER Left 2022    Procedure: INSERTION-CATHETER-JOSEPH;  Surgeon: Dionte Gan MD;  Location: [facility] OR;  Service: General;  Laterality: Left;    PLACEMENT OF DUAL-LUMEN VASCULAR CATHETER Right 2022    Procedure: INSERTION-CATHETER-Hemosplit;  Surgeon: Dionte Gan MD;  Location: [facility];  Service: General;  Laterality: Right;     Family History   Problem Relation Age of Onset    Diabetes Mother     Diabetes Father      Social History     Tobacco Use    Smoking  status: Never    Smokeless tobacco: Never   Substance Use Topics    Alcohol use: Not Currently    Drug use: No     Review of Systems   Constitutional:  Negative for chills, diaphoresis, fatigue and fever.   Respiratory:  Negative for shortness of breath.    Cardiovascular:  Positive for chest pain.   Gastrointestinal:  Positive for abdominal pain, diarrhea, heartburn, nausea and vomiting. Negative for anorexia, constipation, hematemesis, hematochezia, jaundice and melena.   Genitourinary:  Negative for dysuria, frequency, hematuria, urgency, vaginal bleeding and vaginal discharge.   Musculoskeletal:  Positive for back pain.   All other systems reviewed and are negative.    Physical Exam     Initial Vitals [04/08/23 2142]   BP Pulse Resp Temp SpO2   (!) 172/105 90 20 98.3 °F (36.8 °C) 98 %      MAP       --         Physical Exam    Nursing note and vitals reviewed.  Constitutional: She appears well-developed and well-nourished. She is not diaphoretic. She appears distressed.   Writhing, moaning, histrionic   HENT:   Head: Normocephalic and atraumatic.   Mouth/Throat: Oropharynx is clear and moist.   Eyes: No scleral icterus.   Neck: Neck supple. No JVD present.   Normal range of motion.  Cardiovascular:  Normal rate, regular rhythm, normal heart sounds and intact distal pulses.           Pulmonary/Chest: Breath sounds normal. She exhibits no tenderness.   Vas cath right upper chest   Abdominal: Abdomen is soft. Bowel sounds are normal. She exhibits no mass. There is abdominal tenderness.   Normal appearing back. +RCVAT There is no rebound and no guarding.   Musculoskeletal:         General: No tenderness or edema. Normal range of motion.      Cervical back: Normal range of motion and neck supple.     Neurological: She is alert and oriented to person, place, and time.   Skin: Skin is warm and dry. Capillary refill takes less than 2 seconds. No rash noted. No erythema. No pallor.   Psychiatric:   anxious       ED  Course   Procedures  Labs Reviewed   CBC W/ AUTO DIFFERENTIAL - Abnormal; Notable for the following components:       Result Value    RBC 2.97 (*)     Hemoglobin 8.8 (*)     Hematocrit 26.4 (*)     RDW 18.1 (*)     Platelets 101 (*)     Immature Granulocytes 0.9 (*)     Immature Grans (Abs) 0.05 (*)     Lymph # 0.6 (*)     nRBC 2 (*)     Gran % 76.0 (*)     Lymph % 11.3 (*)     All other components within normal limits    Narrative:     Release to patient->Immediate   COMPREHENSIVE METABOLIC PANEL - Abnormal; Notable for the following components:    Potassium 3.1 (*)     Glucose 402 (*)     Creatinine 3.1 (*)     Calcium 8.4 (*)     Albumin 3.3 (*)     Total Bilirubin 2.1 (*)     Alkaline Phosphatase 439 (*)     ALT 60 (*)     eGFR 19.5 (*)     All other components within normal limits    Narrative:     Release to patient->Immediate   B-TYPE NATRIURETIC PEPTIDE - Abnormal; Notable for the following components:    BNP 1,708 (*)     All other components within normal limits    Narrative:     Release to patient->Immediate   LIPASE    Narrative:     Release to patient->Immediate   HIV 1 / 2 ANTIBODY     EKG Readings: (Independently Interpreted)   Rhythm: Normal Sinus Rhythm. Heart Rate: 89. Ectopy: No Ectopy. Conduction: Normal. ST Segments: Normal ST Segments. T Waves: Normal. Axis: Left Axis Deviation. Other Findings: Prolonged QT Interval. Clinical Impression: Normal Sinus Rhythm Other Impression: nonspecific abn     Imaging Results              CT Abdomen Pelvis  Without Contrast (In process)                      X-Ray Chest AP Portable (In process)                      Medications   HYDROmorphone injection 1 mg (1 mg Intravenous Given 4/8/23 2207)   ondansetron injection 8 mg (8 mg Intravenous Given 4/8/23 2207)   potassium chloride SA CR tablet 40 mEq (40 mEq Oral Given 4/8/23 2322)   metoclopramide HCl injection 10 mg (10 mg Intravenous Given 4/8/23 2352)     Medical Decision Making:   Differential Diagnosis:    Pancreatitis, gastritis, gastroparesis, UTI, colitis  Clinical Tests:   Lab Tests: Ordered and Reviewed  Radiological Study: Ordered and Reviewed  Medical Tests: Ordered and Reviewed  ED Management:  Pt presented again with abd pain with NVD. Pt well known to this ED for her numerous visits for same. CBC without leukocytosis. CMP with elevated BUN/Cr and pt on ESRD. Glucose 400 without DKA. K 3.1 and she was given 40meq po. Lipase normal. BNP elevated but not not clinically in CHF and her CXR was neg. She was pain free after dilaudid. CT abd unremarkable other than enteritis. This not causing her level of pain. I suspect gastritis/gastroparesis which is why she typically comes to the ED with abd pain. EKG performed and unremarkable. Pt advised to f/u with her PCP for pain mgt.  reviewed. Only 1 Rx in past year. She was seen here for same 4days ago and she was given norco 5mg #12.            ED Course as of 04/09/23 0020   Sat Apr 08, 2023   2244 Hypoxic after dilaudid. Stable on O2.  [DC]   2254 CXR nap, stable CM. My read [DC]      ED Course User Index  [DC] Gianluca Parra Jr., MD                 Clinical Impression:   Final diagnoses:  [R12] Heartburn  [R10.84] Generalized abdominal pain (Primary)  [K31.84] Gastroparesis  [E87.6] Hypokalemia        ED Disposition Condition    Discharge Stable          ED Prescriptions       Medication Sig Dispense Start Date End Date Auth. Provider    ondansetron (ZOFRAN-ODT) 4 MG TbDL Take 1 tablet (4 mg total) by mouth every 6 (six) hours as needed. 20 tablet 4/9/2023 -- Gianluca Parra Jr., MD    HYDROcodone-acetaminophen (NORCO)  mg per tablet Take 1 tablet by mouth every 6 (six) hours as needed for Pain. 20 tablet 4/9/2023 -- Gianluca Parra Jr., MD          Follow-up Information       Follow up With Specialties Details Why Contact Info    Melony Kim, [facility] Nurse Practitioner In 2 days  [address]  [place] [id number]  [phone]               Gianluca Parra  MD Adele  04/09/23 0020

## 2023-04-10 ENCOUNTER — HOSPITAL ENCOUNTER (INPATIENT)
Facility: HOSPITAL | Age: 33
LOS: 4 days | Discharge: HOME | DRG: 637 | End: 2023-04-14
Attending: EMERGENCY MEDICINE | Admitting: STUDENT IN AN ORGANIZED HEALTH CARE EDUCATION/TRAINING PROGRAM
Payer: MEDICAID

## 2023-04-10 ENCOUNTER — OFFICE VISIT (OUTPATIENT)
Dept: ENDOCRINOLOGY | Facility: CLINIC | Age: 33
DRG: 637 | End: 2023-04-10
Payer: MEDICAID

## 2023-04-10 VITALS
BODY MASS INDEX: 22.15 KG/M2 | WEIGHT: 120.38 LBS | OXYGEN SATURATION: 97 % | HEART RATE: 92 BPM | SYSTOLIC BLOOD PRESSURE: 190 MMHG | HEIGHT: 62 IN | DIASTOLIC BLOOD PRESSURE: 60 MMHG

## 2023-04-10 DIAGNOSIS — D64.9 SYMPTOMATIC ANEMIA: ICD-10-CM

## 2023-04-10 DIAGNOSIS — R73.9 HYPERGLYCEMIA: Primary | ICD-10-CM

## 2023-04-10 DIAGNOSIS — R11.10 VOMITING: ICD-10-CM

## 2023-04-10 DIAGNOSIS — E11.65 UNCONTROLLED TYPE 2 DIABETES MELLITUS WITH HYPERGLYCEMIA: Primary | ICD-10-CM

## 2023-04-10 DIAGNOSIS — R11.10 VOMITING, UNSPECIFIED VOMITING TYPE, UNSPECIFIED WHETHER NAUSEA PRESENT: ICD-10-CM

## 2023-04-10 DIAGNOSIS — E11.65 TYPE 2 DIABETES MELLITUS WITH HYPERGLYCEMIA, WITH LONG-TERM CURRENT USE OF INSULIN: ICD-10-CM

## 2023-04-10 DIAGNOSIS — Z79.4 TYPE 2 DIABETES MELLITUS WITH HYPERGLYCEMIA, WITH LONG-TERM CURRENT USE OF INSULIN: ICD-10-CM

## 2023-04-10 DIAGNOSIS — R10.9 ABDOMINAL PAIN, UNSPECIFIED ABDOMINAL LOCATION: ICD-10-CM

## 2023-04-10 DIAGNOSIS — E11.10 DKA (DIABETIC KETOACIDOSIS): ICD-10-CM

## 2023-04-10 DIAGNOSIS — R07.9 CHEST PAIN: ICD-10-CM

## 2023-04-10 PROBLEM — F39 MOOD DISORDER: Status: ACTIVE | Noted: 2023-04-10

## 2023-04-10 PROBLEM — I82.722 CHRONIC DEEP VEIN THROMBOSIS (DVT) OF LEFT UPPER EXTREMITY: Status: ACTIVE | Noted: 2023-04-10

## 2023-04-10 LAB
ALBUMIN SERPL BCP-MCNC: 3.5 G/DL (ref 3.5–5.2)
ALLENS TEST: ABNORMAL
ALP SERPL-CCNC: 431 U/L (ref 55–135)
ALT SERPL W/O P-5'-P-CCNC: 47 U/L (ref 10–44)
ANION GAP SERPL CALC-SCNC: 13 MMOL/L (ref 8–16)
ANION GAP SERPL CALC-SCNC: 17 MMOL/L (ref 8–16)
AST SERPL-CCNC: 34 U/L (ref 10–40)
B-OH-BUTYR BLD STRIP-SCNC: 0.9 MMOL/L (ref 0–0.5)
BASOPHILS # BLD AUTO: 0.03 K/UL (ref 0–0.2)
BASOPHILS NFR BLD: 0.7 % (ref 0–1.9)
BILIRUB SERPL-MCNC: 1.7 MG/DL (ref 0.1–1)
BNP SERPL-MCNC: 2187 PG/ML (ref 0–99)
BUN SERPL-MCNC: 27 MG/DL (ref 6–20)
BUN SERPL-MCNC: 29 MG/DL (ref 6–20)
CALCIUM SERPL-MCNC: 8.6 MG/DL (ref 8.7–10.5)
CALCIUM SERPL-MCNC: 9 MG/DL (ref 8.7–10.5)
CHLORIDE SERPL-SCNC: 100 MMOL/L (ref 95–110)
CHLORIDE SERPL-SCNC: 98 MMOL/L (ref 95–110)
CO2 SERPL-SCNC: 21 MMOL/L (ref 23–29)
CO2 SERPL-SCNC: 24 MMOL/L (ref 23–29)
CREAT SERPL-MCNC: 5.8 MG/DL (ref 0.5–1.4)
CREAT SERPL-MCNC: 6.1 MG/DL (ref 0.5–1.4)
DIFFERENTIAL METHOD: ABNORMAL
EOSINOPHIL # BLD AUTO: 0.1 K/UL (ref 0–0.5)
EOSINOPHIL NFR BLD: 1.8 % (ref 0–8)
ERYTHROCYTE [DISTWIDTH] IN BLOOD BY AUTOMATED COUNT: 18.7 % (ref 11.5–14.5)
EST. GFR  (NO RACE VARIABLE): 8.7 ML/MIN/1.73 M^2
EST. GFR  (NO RACE VARIABLE): 9.2 ML/MIN/1.73 M^2
GLUCOSE SERPL-MCNC: 110 MG/DL (ref 70–110)
GLUCOSE SERPL-MCNC: 509 MG/DL (ref 70–110)
HCG INTACT+B SERPL-ACNC: <2.4 MIU/ML
HCO3 UR-SCNC: 29 MMOL/L (ref 24–28)
HCT VFR BLD AUTO: 26 % (ref 37–48.5)
HGB BLD-MCNC: 8.4 G/DL (ref 12–16)
IMM GRANULOCYTES # BLD AUTO: 0.05 K/UL (ref 0–0.04)
IMM GRANULOCYTES NFR BLD AUTO: 1.1 % (ref 0–0.5)
LACTATE SERPL-SCNC: 2.2 MMOL/L (ref 0.5–2.2)
LIPASE SERPL-CCNC: 21 U/L (ref 4–60)
LYMPHOCYTES # BLD AUTO: 0.8 K/UL (ref 1–4.8)
LYMPHOCYTES NFR BLD: 17.5 % (ref 18–48)
MAGNESIUM SERPL-MCNC: 1.9 MG/DL (ref 1.6–2.6)
MCH RBC QN AUTO: 29.9 PG (ref 27–31)
MCHC RBC AUTO-ENTMCNC: 32.3 G/DL (ref 32–36)
MCV RBC AUTO: 93 FL (ref 82–98)
MONOCYTES # BLD AUTO: 0.5 K/UL (ref 0.3–1)
MONOCYTES NFR BLD: 9.8 % (ref 4–15)
NEUTROPHILS # BLD AUTO: 3.2 K/UL (ref 1.8–7.7)
NEUTROPHILS NFR BLD: 69.1 % (ref 38–73)
NRBC BLD-RTO: 1 /100 WBC
PCO2 BLDA: 49.8 MMHG (ref 35–45)
PH SMN: 7.37 [PH] (ref 7.35–7.45)
PHOSPHATE SERPL-MCNC: 5.1 MG/DL (ref 2.7–4.5)
PLATELET # BLD AUTO: 100 K/UL (ref 150–450)
PMV BLD AUTO: 10.9 FL (ref 9.2–12.9)
PO2 BLDA: 19 MMHG (ref 40–60)
POC BE: 4 MMOL/L
POC SATURATED O2: 27 % (ref 95–100)
POC TCO2: 30 MMOL/L (ref 24–29)
POCT GLUCOSE: 101 MG/DL (ref 70–110)
POCT GLUCOSE: 118 MG/DL (ref 70–110)
POCT GLUCOSE: 165 MG/DL (ref 70–110)
POCT GLUCOSE: 190 MG/DL (ref 70–110)
POCT GLUCOSE: 345 MG/DL (ref 70–110)
POCT GLUCOSE: 41 MG/DL (ref 70–110)
POCT GLUCOSE: 56 MG/DL (ref 70–110)
POTASSIUM SERPL-SCNC: 3.4 MMOL/L (ref 3.5–5.1)
POTASSIUM SERPL-SCNC: 4.4 MMOL/L (ref 3.5–5.1)
PROT SERPL-MCNC: 7.6 G/DL (ref 6–8.4)
RBC # BLD AUTO: 2.81 M/UL (ref 4–5.4)
SAMPLE: ABNORMAL
SITE: ABNORMAL
SODIUM SERPL-SCNC: 136 MMOL/L (ref 136–145)
SODIUM SERPL-SCNC: 137 MMOL/L (ref 136–145)
TROPONIN I SERPL DL<=0.01 NG/ML-MCNC: 0.04 NG/ML (ref 0–0.03)
TROPONIN I SERPL DL<=0.01 NG/ML-MCNC: 0.04 NG/ML (ref 0–0.03)
WBC # BLD AUTO: 4.57 K/UL (ref 3.9–12.7)

## 2023-04-10 PROCEDURE — 99223 PR INITIAL HOSPITAL CARE,LEVL III: ICD-10-PCS | Mod: ,,, | Performed by: STUDENT IN AN ORGANIZED HEALTH CARE EDUCATION/TRAINING PROGRAM

## 2023-04-10 PROCEDURE — 25000003 PHARM REV CODE 250: Performed by: STUDENT IN AN ORGANIZED HEALTH CARE EDUCATION/TRAINING PROGRAM

## 2023-04-10 PROCEDURE — 3078F PR MOST RECENT DIASTOLIC BLOOD PRESSURE < 80 MM HG: ICD-10-PCS | Mod: CPTII,,, | Performed by: GENERAL ACUTE CARE HOSPITAL

## 2023-04-10 PROCEDURE — 83735 ASSAY OF MAGNESIUM: CPT | Performed by: EMERGENCY MEDICINE

## 2023-04-10 PROCEDURE — 85025 COMPLETE CBC W/AUTO DIFF WBC: CPT | Performed by: EMERGENCY MEDICINE

## 2023-04-10 PROCEDURE — 3078F DIAST BP <80 MM HG: CPT | Mod: CPTII,,, | Performed by: GENERAL ACUTE CARE HOSPITAL

## 2023-04-10 PROCEDURE — 3044F PR MOST RECENT HEMOGLOBIN A1C LEVEL <7.0%: ICD-10-PCS | Mod: CPTII,,, | Performed by: GENERAL ACUTE CARE HOSPITAL

## 2023-04-10 PROCEDURE — 83880 ASSAY OF NATRIURETIC PEPTIDE: CPT | Performed by: EMERGENCY MEDICINE

## 2023-04-10 PROCEDURE — 99223 1ST HOSP IP/OBS HIGH 75: CPT | Mod: ,,, | Performed by: STUDENT IN AN ORGANIZED HEALTH CARE EDUCATION/TRAINING PROGRAM

## 2023-04-10 PROCEDURE — S5010 5% DEXTROSE AND 0.45% SALINE: HCPCS | Performed by: STUDENT IN AN ORGANIZED HEALTH CARE EDUCATION/TRAINING PROGRAM

## 2023-04-10 PROCEDURE — 99223 PR INITIAL HOSPITAL CARE,LEVL III: ICD-10-PCS | Mod: ,,, | Performed by: NURSE PRACTITIONER

## 2023-04-10 PROCEDURE — 99499 NO LOS: ICD-10-PCS | Mod: S$PBB,,, | Performed by: GENERAL ACUTE CARE HOSPITAL

## 2023-04-10 PROCEDURE — 3008F PR BODY MASS INDEX (BMI) DOCUMENTED: ICD-10-PCS | Mod: CPTII,,, | Performed by: GENERAL ACUTE CARE HOSPITAL

## 2023-04-10 PROCEDURE — 96368 THER/DIAG CONCURRENT INF: CPT

## 2023-04-10 PROCEDURE — 63600175 PHARM REV CODE 636 W HCPCS: Performed by: STUDENT IN AN ORGANIZED HEALTH CARE EDUCATION/TRAINING PROGRAM

## 2023-04-10 PROCEDURE — 25000003 PHARM REV CODE 250: Performed by: EMERGENCY MEDICINE

## 2023-04-10 PROCEDURE — G0378 HOSPITAL OBSERVATION PER HR: HCPCS

## 2023-04-10 PROCEDURE — 99285 EMERGENCY DEPT VISIT HI MDM: CPT | Mod: 25,27

## 2023-04-10 PROCEDURE — 99999 PR PBB SHADOW E&M-EST. PATIENT-LVL V: ICD-10-PCS | Mod: PBBFAC,TXP,, | Performed by: GENERAL ACUTE CARE HOSPITAL

## 2023-04-10 PROCEDURE — 99499 UNLISTED E&M SERVICE: CPT | Mod: S$PBB,,, | Performed by: GENERAL ACUTE CARE HOSPITAL

## 2023-04-10 PROCEDURE — 1159F PR MEDICATION LIST DOCUMENTED IN MEDICAL RECORD: ICD-10-PCS | Mod: CPTII,,, | Performed by: GENERAL ACUTE CARE HOSPITAL

## 2023-04-10 PROCEDURE — 3066F NEPHROPATHY DOC TX: CPT | Mod: CPTII,,, | Performed by: GENERAL ACUTE CARE HOSPITAL

## 2023-04-10 PROCEDURE — 99223 1ST HOSP IP/OBS HIGH 75: CPT | Mod: ,,, | Performed by: NURSE PRACTITIONER

## 2023-04-10 PROCEDURE — 93010 EKG 12-LEAD: ICD-10-PCS | Mod: ,,, | Performed by: INTERNAL MEDICINE

## 2023-04-10 PROCEDURE — 3008F BODY MASS INDEX DOCD: CPT | Mod: CPTII,,, | Performed by: GENERAL ACUTE CARE HOSPITAL

## 2023-04-10 PROCEDURE — 80048 BASIC METABOLIC PNL TOTAL CA: CPT | Mod: XB | Performed by: STUDENT IN AN ORGANIZED HEALTH CARE EDUCATION/TRAINING PROGRAM

## 2023-04-10 PROCEDURE — 12000002 HC ACUTE/MED SURGE SEMI-PRIVATE ROOM

## 2023-04-10 PROCEDURE — 83605 ASSAY OF LACTIC ACID: CPT | Performed by: STUDENT IN AN ORGANIZED HEALTH CARE EDUCATION/TRAINING PROGRAM

## 2023-04-10 PROCEDURE — 63600175 PHARM REV CODE 636 W HCPCS: Performed by: EMERGENCY MEDICINE

## 2023-04-10 PROCEDURE — 82962 GLUCOSE BLOOD TEST: CPT

## 2023-04-10 PROCEDURE — 96365 THER/PROPH/DIAG IV INF INIT: CPT

## 2023-04-10 PROCEDURE — 3077F SYST BP >= 140 MM HG: CPT | Mod: CPTII,,, | Performed by: GENERAL ACUTE CARE HOSPITAL

## 2023-04-10 PROCEDURE — 3066F PR DOCUMENTATION OF TREATMENT FOR NEPHROPATHY: ICD-10-PCS | Mod: CPTII,,, | Performed by: GENERAL ACUTE CARE HOSPITAL

## 2023-04-10 PROCEDURE — 1159F MED LIST DOCD IN RCRD: CPT | Mod: CPTII,,, | Performed by: GENERAL ACUTE CARE HOSPITAL

## 2023-04-10 PROCEDURE — 80053 COMPREHEN METABOLIC PANEL: CPT | Performed by: EMERGENCY MEDICINE

## 2023-04-10 PROCEDURE — 83690 ASSAY OF LIPASE: CPT | Performed by: EMERGENCY MEDICINE

## 2023-04-10 PROCEDURE — 3044F HG A1C LEVEL LT 7.0%: CPT | Mod: CPTII,,, | Performed by: GENERAL ACUTE CARE HOSPITAL

## 2023-04-10 PROCEDURE — 82010 KETONE BODYS QUAN: CPT | Performed by: EMERGENCY MEDICINE

## 2023-04-10 PROCEDURE — 96375 TX/PRO/DX INJ NEW DRUG ADDON: CPT

## 2023-04-10 PROCEDURE — 99222 1ST HOSP IP/OBS MODERATE 55: CPT | Mod: ,,, | Performed by: INTERNAL MEDICINE

## 2023-04-10 PROCEDURE — 93005 ELECTROCARDIOGRAM TRACING: CPT | Performed by: INTERNAL MEDICINE

## 2023-04-10 PROCEDURE — 82803 BLOOD GASES ANY COMBINATION: CPT | Mod: NTX

## 2023-04-10 PROCEDURE — 84100 ASSAY OF PHOSPHORUS: CPT | Performed by: EMERGENCY MEDICINE

## 2023-04-10 PROCEDURE — U0002 COVID-19 LAB TEST NON-CDC: HCPCS | Performed by: STUDENT IN AN ORGANIZED HEALTH CARE EDUCATION/TRAINING PROGRAM

## 2023-04-10 PROCEDURE — 99222 PR INITIAL HOSPITAL CARE,LEVL II: ICD-10-PCS | Mod: ,,, | Performed by: INTERNAL MEDICINE

## 2023-04-10 PROCEDURE — 93010 ELECTROCARDIOGRAM REPORT: CPT | Mod: ,,, | Performed by: INTERNAL MEDICINE

## 2023-04-10 PROCEDURE — 99999 PR PBB SHADOW E&M-EST. PATIENT-LVL V: CPT | Mod: PBBFAC,TXP,, | Performed by: GENERAL ACUTE CARE HOSPITAL

## 2023-04-10 PROCEDURE — 99215 OFFICE O/P EST HI 40 MIN: CPT | Mod: PBBFAC,TXP,25 | Performed by: GENERAL ACUTE CARE HOSPITAL

## 2023-04-10 PROCEDURE — 99285 EMERGENCY DEPT VISIT HI MDM: CPT | Mod: CS,,, | Performed by: EMERGENCY MEDICINE

## 2023-04-10 PROCEDURE — 99900035 HC TECH TIME PER 15 MIN (STAT): Mod: NTX

## 2023-04-10 PROCEDURE — 99285 PR EMERGENCY DEPT VISIT,LEVEL V: ICD-10-PCS | Mod: CS,,, | Performed by: EMERGENCY MEDICINE

## 2023-04-10 PROCEDURE — 3077F PR MOST RECENT SYSTOLIC BLOOD PRESSURE >= 140 MM HG: ICD-10-PCS | Mod: CPTII,,, | Performed by: GENERAL ACUTE CARE HOSPITAL

## 2023-04-10 PROCEDURE — 84484 ASSAY OF TROPONIN QUANT: CPT | Performed by: EMERGENCY MEDICINE

## 2023-04-10 PROCEDURE — 84702 CHORIONIC GONADOTROPIN TEST: CPT | Performed by: EMERGENCY MEDICINE

## 2023-04-10 RX ORDER — DEXTROSE 40 %
30 GEL (GRAM) ORAL
Status: DISCONTINUED | OUTPATIENT
Start: 2023-04-10 | End: 2023-04-11

## 2023-04-10 RX ORDER — AMLODIPINE BESYLATE 5 MG/1
5 TABLET ORAL DAILY
Status: DISCONTINUED | OUTPATIENT
Start: 2023-04-11 | End: 2023-04-14 | Stop reason: HOSPADM

## 2023-04-10 RX ORDER — AMLODIPINE BESYLATE 10 MG/1
10 TABLET ORAL
Status: COMPLETED | OUTPATIENT
Start: 2023-04-10 | End: 2023-04-10

## 2023-04-10 RX ORDER — PANTOPRAZOLE SODIUM 40 MG/1
40 TABLET, DELAYED RELEASE ORAL DAILY
Status: DISCONTINUED | OUTPATIENT
Start: 2023-04-11 | End: 2023-04-14 | Stop reason: HOSPADM

## 2023-04-10 RX ORDER — GABAPENTIN 300 MG/1
300 CAPSULE ORAL DAILY
Status: DISCONTINUED | OUTPATIENT
Start: 2023-04-11 | End: 2023-04-14 | Stop reason: HOSPADM

## 2023-04-10 RX ORDER — IBUPROFEN 200 MG
24 TABLET ORAL
Status: DISCONTINUED | OUTPATIENT
Start: 2023-04-10 | End: 2023-04-10

## 2023-04-10 RX ORDER — ACETAMINOPHEN 325 MG/1
650 TABLET ORAL EVERY 4 HOURS PRN
Status: DISCONTINUED | OUTPATIENT
Start: 2023-04-10 | End: 2023-04-11

## 2023-04-10 RX ORDER — SODIUM CHLORIDE 9 MG/ML
1000 INJECTION, SOLUTION INTRAVENOUS CONTINUOUS
Status: DISCONTINUED | OUTPATIENT
Start: 2023-04-10 | End: 2023-04-10

## 2023-04-10 RX ORDER — METOCLOPRAMIDE HYDROCHLORIDE 5 MG/ML
10 INJECTION INTRAMUSCULAR; INTRAVENOUS EVERY 6 HOURS PRN
Status: DISCONTINUED | OUTPATIENT
Start: 2023-04-10 | End: 2023-04-11

## 2023-04-10 RX ORDER — ACETAMINOPHEN 325 MG/1
650 TABLET ORAL EVERY 8 HOURS PRN
Status: DISCONTINUED | OUTPATIENT
Start: 2023-04-10 | End: 2023-04-10

## 2023-04-10 RX ORDER — IBUPROFEN 200 MG
16 TABLET ORAL
Status: DISCONTINUED | OUTPATIENT
Start: 2023-04-10 | End: 2023-04-10

## 2023-04-10 RX ORDER — SODIUM CHLORIDE 0.9 % (FLUSH) 0.9 %
10 SYRINGE (ML) INJECTION
Status: DISCONTINUED | OUTPATIENT
Start: 2023-04-10 | End: 2023-04-14 | Stop reason: HOSPADM

## 2023-04-10 RX ORDER — DEXTROSE 40 %
15 GEL (GRAM) ORAL
Status: DISCONTINUED | OUTPATIENT
Start: 2023-04-10 | End: 2023-04-11

## 2023-04-10 RX ORDER — DEXTROSE MONOHYDRATE AND SODIUM CHLORIDE 5; .45 G/100ML; G/100ML
INJECTION, SOLUTION INTRAVENOUS CONTINUOUS
Status: DISCONTINUED | OUTPATIENT
Start: 2023-04-10 | End: 2023-04-11

## 2023-04-10 RX ORDER — MORPHINE SULFATE 2 MG/ML
2 INJECTION, SOLUTION INTRAMUSCULAR; INTRAVENOUS
Status: COMPLETED | OUTPATIENT
Start: 2023-04-10 | End: 2023-04-10

## 2023-04-10 RX ORDER — CARVEDILOL 12.5 MG/1
25 TABLET ORAL ONCE
Status: COMPLETED | OUTPATIENT
Start: 2023-04-10 | End: 2023-04-10

## 2023-04-10 RX ORDER — DEXTROSE MONOHYDRATE AND SODIUM CHLORIDE 5; .45 G/100ML; G/100ML
125 INJECTION, SOLUTION INTRAVENOUS CONTINUOUS PRN
Status: DISCONTINUED | OUTPATIENT
Start: 2023-04-10 | End: 2023-04-10

## 2023-04-10 RX ORDER — SODIUM CHLORIDE AND POTASSIUM CHLORIDE 150; 900 MG/100ML; MG/100ML
INJECTION, SOLUTION INTRAVENOUS
Status: COMPLETED | OUTPATIENT
Start: 2023-04-10 | End: 2023-04-10

## 2023-04-10 RX ORDER — ONDANSETRON 2 MG/ML
4 INJECTION INTRAMUSCULAR; INTRAVENOUS
Status: COMPLETED | OUTPATIENT
Start: 2023-04-10 | End: 2023-04-10

## 2023-04-10 RX ORDER — NALOXONE HCL 0.4 MG/ML
0.02 VIAL (ML) INJECTION
Status: DISCONTINUED | OUTPATIENT
Start: 2023-04-10 | End: 2023-04-14 | Stop reason: HOSPADM

## 2023-04-10 RX ORDER — GLUCAGON 1 MG
1 KIT INJECTION
Status: DISCONTINUED | OUTPATIENT
Start: 2023-04-10 | End: 2023-04-11

## 2023-04-10 RX ORDER — LEVETIRACETAM 500 MG/1
500 TABLET ORAL 2 TIMES DAILY
Status: DISCONTINUED | OUTPATIENT
Start: 2023-04-10 | End: 2023-04-14 | Stop reason: HOSPADM

## 2023-04-10 RX ORDER — HYDRALAZINE HYDROCHLORIDE 50 MG/1
100 TABLET, FILM COATED ORAL EVERY 12 HOURS
Status: DISCONTINUED | OUTPATIENT
Start: 2023-04-10 | End: 2023-04-14 | Stop reason: HOSPADM

## 2023-04-10 RX ORDER — DULAGLUTIDE 0.75 MG/.5ML
0.75 INJECTION, SOLUTION SUBCUTANEOUS
Qty: 4 PEN | Refills: 3 | Status: CANCELLED | OUTPATIENT
Start: 2023-04-10 | End: 2023-05-10

## 2023-04-10 RX ORDER — DEXTROSE MONOHYDRATE AND SODIUM CHLORIDE 5; .45 G/100ML; G/100ML
INJECTION, SOLUTION INTRAVENOUS CONTINUOUS PRN
Status: DISCONTINUED | OUTPATIENT
Start: 2023-04-10 | End: 2023-04-10

## 2023-04-10 RX ORDER — MIRTAZAPINE 15 MG/1
15 TABLET, ORALLY DISINTEGRATING ORAL NIGHTLY
Status: DISCONTINUED | OUTPATIENT
Start: 2023-04-10 | End: 2023-04-12

## 2023-04-10 RX ORDER — ONDANSETRON 2 MG/ML
4 INJECTION INTRAMUSCULAR; INTRAVENOUS EVERY 6 HOURS PRN
Status: DISCONTINUED | OUTPATIENT
Start: 2023-04-10 | End: 2023-04-10

## 2023-04-10 RX ORDER — HYDROMORPHONE HYDROCHLORIDE 1 MG/ML
1 INJECTION, SOLUTION INTRAMUSCULAR; INTRAVENOUS; SUBCUTANEOUS EVERY 6 HOURS PRN
Status: DISCONTINUED | OUTPATIENT
Start: 2023-04-10 | End: 2023-04-11

## 2023-04-10 RX ORDER — ALBUTEROL SULFATE 90 UG/1
2 AEROSOL, METERED RESPIRATORY (INHALATION) EVERY 6 HOURS PRN
Status: DISCONTINUED | OUTPATIENT
Start: 2023-04-10 | End: 2023-04-14 | Stop reason: HOSPADM

## 2023-04-10 RX ORDER — FLUOXETINE HYDROCHLORIDE 20 MG/1
20 CAPSULE ORAL DAILY
Status: DISCONTINUED | OUTPATIENT
Start: 2023-04-11 | End: 2023-04-14 | Stop reason: HOSPADM

## 2023-04-10 RX ORDER — ISOSORBIDE MONONITRATE 30 MG/1
60 TABLET, EXTENDED RELEASE ORAL DAILY
Status: DISCONTINUED | OUTPATIENT
Start: 2023-04-11 | End: 2023-04-14 | Stop reason: HOSPADM

## 2023-04-10 RX ORDER — ONDANSETRON 2 MG/ML
4 INJECTION INTRAMUSCULAR; INTRAVENOUS EVERY 6 HOURS PRN
Status: DISCONTINUED | OUTPATIENT
Start: 2023-04-10 | End: 2023-04-14 | Stop reason: HOSPADM

## 2023-04-10 RX ORDER — CARVEDILOL 25 MG/1
25 TABLET ORAL 2 TIMES DAILY
Status: DISCONTINUED | OUTPATIENT
Start: 2023-04-10 | End: 2023-04-11

## 2023-04-10 RX ADMIN — POTASSIUM BICARBONATE 20 MEQ: 391 TABLET, EFFERVESCENT ORAL at 09:04

## 2023-04-10 RX ADMIN — AMLODIPINE BESYLATE 10 MG: 10 TABLET ORAL at 11:04

## 2023-04-10 RX ADMIN — ONDANSETRON 4 MG: 2 INJECTION INTRAMUSCULAR; INTRAVENOUS at 10:04

## 2023-04-10 RX ADMIN — APIXABAN 5 MG: 5 TABLET, FILM COATED ORAL at 09:04

## 2023-04-10 RX ADMIN — SODIUM CHLORIDE AND POTASSIUM CHLORIDE: .9; .15 SOLUTION INTRAVENOUS at 01:04

## 2023-04-10 RX ADMIN — DEXTROSE AND SODIUM CHLORIDE: 5; 450 INJECTION, SOLUTION INTRAVENOUS at 09:04

## 2023-04-10 RX ADMIN — Medication 30000 MG: at 05:04

## 2023-04-10 RX ADMIN — MIRTAZAPINE 15 MG: 15 TABLET, ORALLY DISINTEGRATING ORAL at 11:04

## 2023-04-10 RX ADMIN — INSULIN HUMAN 0.1 UNITS/KG/HR: 1 INJECTION, SOLUTION INTRAVENOUS at 01:04

## 2023-04-10 RX ADMIN — MORPHINE SULFATE 2 MG: 2 INJECTION, SOLUTION INTRAMUSCULAR; INTRAVENOUS at 10:04

## 2023-04-10 RX ADMIN — LEVETIRACETAM 500 MG: 500 TABLET, FILM COATED ORAL at 09:04

## 2023-04-10 RX ADMIN — ONDANSETRON 4 MG: 2 INJECTION INTRAMUSCULAR; INTRAVENOUS at 04:04

## 2023-04-10 RX ADMIN — DEXTROSE AND SODIUM CHLORIDE: 5; 450 INJECTION, SOLUTION INTRAVENOUS at 04:04

## 2023-04-10 RX ADMIN — SODIUM CHLORIDE 1000 ML: 9 INJECTION, SOLUTION INTRAVENOUS at 06:04

## 2023-04-10 RX ADMIN — HYDRALAZINE HYDROCHLORIDE 100 MG: 50 TABLET ORAL at 09:04

## 2023-04-10 RX ADMIN — CARVEDILOL 25 MG: 25 TABLET, FILM COATED ORAL at 09:04

## 2023-04-10 RX ADMIN — CARVEDILOL 25 MG: 12.5 TABLET, FILM COATED ORAL at 01:04

## 2023-04-10 RX ADMIN — HYDROMORPHONE HYDROCHLORIDE 1 MG: 1 INJECTION, SOLUTION INTRAMUSCULAR; INTRAVENOUS; SUBCUTANEOUS at 04:04

## 2023-04-10 RX ADMIN — DEXTROSE MONOHYDRATE 125 ML: 100 INJECTION, SOLUTION INTRAVENOUS at 06:04

## 2023-04-10 NOTE — ASSESSMENT & PLAN NOTE
Patient is identified as having Diastolic (HFpEF) heart failure that is Acute on chronic. CHF is currently uncontrolled due to >3 pillow orthopnea. Latest ECHO performed and demonstrates- Results for orders placed during the hospital encounter of 01/12/23    Echo    Interpretation Summary  · The left ventricle is normal in size with moderate concentric hypertrophy and normal systolic function.  · The estimated ejection fraction is 55%.  · Grade III left ventricular diastolic dysfunction.  · Normal right ventricular size with normal right ventricular systolic function.  · Moderate left atrial enlargement.  · Moderate to severe tricuspid regurgitation.  · Mild to moderate pulmonic regurgitation.  · Mild mitral regurgitation.  · Normal central venous pressure (3 mmHg).  · The estimated PA systolic pressure is 56 mmHg.  · There is pulmonary hypertension.  · Moderate to large circumferential pericardial effusion. No evidence of tamponade.  . Continue Beta Blocker and monitor clinical status closely. Monitor on telemetry. Patient is off CHF pathway.  Monitor strict Is&Os and daily weights.  Place on fluid restriction of 1.5 L. Continue to stress to patient importance of self efficacy and  on diet for CHF. Last BNP reviewed- and noted below   Recent Labs   Lab 04/10/23  1031   BNP 2,187*   .

## 2023-04-10 NOTE — ASSESSMENT & PLAN NOTE
Outpatient HD Information:    -Outpatient HD unit: [facility]   -HD tx days: TTS  -HD tx time: 4hrs   -Last HD tx prior to presentation: 4/8/23  -HD access: R IJ TDC   -HD modality: iHD   -Residual urine: Anuric       Plan/Recommendations:    -Plan for HD tomorrow, per patient's TTS HD schedule   -Renal diet  -Strict I/O's and daily weights  -Daily renal function panels   -Renally dose meds

## 2023-04-10 NOTE — HPI
33 year old  female with type 2 diabetes mellitus, ESRD on HD (TTS), CHF, gastroparesis, sickle cell trait, seizure disorder, and hypertension.     - Patient sent to ED from endocrine clinic for severe hyperglycemia and concern for DKA    - Endocrinology consulted for management of severe hyperglycemia and type 2 diabetes mellitus    Regarding Type 2 diabetes mellitus:    Lab Results   Component Value Date    CO2 21 (L) 04/10/2023    CO2 25 2023    CO2 25 2023    CO2 26 2023    ANIONGAP 17 (H) 04/10/2023    ANIONGAP 15 2023    ANIONGAP 17 (H) 2023    ANIONGAP 6 (L) 2023    K 4.4 04/10/2023    K 3.1 (L) 2023    K 3.8 2023    EGFRNORACEVR 8.7 (A) 04/10/2023    EGFRNORACEVR 19.5 (A) 2023    BHYDRXBUT 0.9 (H) 04/10/2023    BHYDRXBUT 0.1 2023    HGBA1C 5.8 2023    HGBA1C 7.5 (H) 2022    HGBA1C 6.2 2022      Lab Results   Component Value Date    LIPASE 21 04/10/2023     Lab Results   Component Value Date     04/10/2023     2023     2023     (HH) 04/10/2023     (H) 2023     (H) 2023      - Corrected sodium calculator = 144 mEq/L    - Initially diagnosed with diabetes mellitus:  with gestational diabetes  - Home diabetic medications include: Patient unsure as her mother handles her insulin injections but she thinks she is on Lantus 18 units daily and NovoLog 8 units TIDWM  - Other medications tried: NA  - Weight based dosin kg x 0.3 (ESRD) = 16 TDD x 0.5 = 8 basal / 8 prandial  - 1700/TDD = 106 (estimated insulin sensitivity factor)  - 450/TDD = 28 (estimated starting carb ratio for prandial dosing)  - Family History:  Mother, grandmother, and cousins with type 2 diabetes mellitus

## 2023-04-10 NOTE — CONSULTS
[facility] - Emergency Dept  Nephrology  Consult Note    Patient Name: Tabby Howard  MRN: [id number]  Admission Date: 4/10/2023  Hospital Length of Stay: 0 days  Attending Provider: Sharif Barone MD   Primary Care Physician: AIDEN CONNER NP  Principal Problem:<principal problem not specified>    Inpatient consult to Nephrology  Consult performed by: Henri Montoya NP  Consult ordered by: Emil Landrum MD  Reason for consult: ESRD/HD         Subjective:     HPI: Tabby Howard is a 32 yo female  with a medical history of gastroparesis, sickle cell trait, DM type 2, HTN, CHF, and ESRD on HD (//Sat) who last dialyzed on 23. Patient sent to ED from clinic for high blood sugar and HTN, found to have glucose >600 and SBP in 200's . Patient endorses abdominal pain, N/V, and back pian. She is anuric at baseline. Nephrology was consulted for management of ESRD/HD.       Past Medical History:   Diagnosis Date    CKD (chronic kidney disease), stage IV 2022    Diabetes mellitus due to underlying condition with unspecified complications 2022    Gastroparesis 2022    Heart failure with preserved ejection fraction 2022    EF 55% on 3/22    History of gastroesophageal reflux (GERD)     History of supraventricular tachycardia     Hyperkalemia 2022    Hypertensive emergency 2022    Sickle cell trait 2022    Type 2 diabetes mellitus        Past Surgical History:   Procedure Laterality Date     SECTION      x 3    COLONOSCOPY      COLONOSCOPY N/A 2022    Procedure: COLONOSCOPY;  Surgeon: Jagdeep Cedeno MD;  [facility];  Service: Endoscopy;  Laterality: N/A;    ESOPHAGOGASTRODUODENOSCOPY N/A 10/18/2019    Procedure: ESOPHAGOGASTRODUODENOSCOPY (EGD);  Surgeon: Gianluca Mendez MD;  Location: [facility];  Service: Endoscopy;  Laterality: N/A;    ESOPHAGOGASTRODUODENOSCOPY N/A 2022    Procedure: EGD (ESOPHAGOGASTRODUODENOSCOPY);  Surgeon:  Micky Paredes III, MD;  Location: [facility] END[facility];  Service: Endoscopy;  Laterality: N/A;    ESOPHAGOGASTRODUODENOSCOPY N/A 12/5/2022    Procedure: EGD (ESOPHAGOGASTRODUODENOSCOPY);  Surgeon: Marcelo Zhong MD;  Location: [facility] ENDO;  Service: Endoscopy;  Laterality: N/A;    LAPAROSCOPIC CHOLECYSTECTOMY N/A 07/30/2022    Procedure: CHOLECYSTECTOMY, LAPAROSCOPIC;  Surgeon: Grey Perez MD;  Location: [facility] OR;  Service: General;  Laterality: N/A;    PLACEMENT OF DUAL-LUMEN VASCULAR CATHETER Left 07/12/2022    Procedure: INSERTION-CATHETER-JOSEPH;  Surgeon: Dionte Gan MD;  Location: [facility] OR;  Service: General;  Laterality: Left;    PLACEMENT OF DUAL-LUMEN VASCULAR CATHETER Right 07/26/2022    Procedure: INSERTION-CATHETER-Hemosplit;  Surgeon: Dionte Gan MD;  Location: [facility] OR;  Service: General;  Laterality: Right;       Review of patient's allergies indicates:   Allergen Reactions    Penicillins Hives     Current Facility-Administered Medications   Medication Frequency    dextrose 10% bolus 125 mL 125 mL PRN    dextrose 10% bolus 250 mL 250 mL PRN    dextrose 40 % gel 15,000 mg PRN    dextrose 40 % gel 30,000 mg PRN    glucagon (human recombinant) injection 1 mg PRN    insulin regular in 0.9 % NaCl 100 unit/100 mL (1 unit/mL) infusion Continuous     Current Outpatient Medications   Medication    albuterol (PROVENTIL/VENTOLIN HFA) 90 mcg/actuation inhaler    amLODIPine (NORVASC) 10 MG tablet    apixaban (ELIQUIS) 5 mg Tab    BINAXNOW COVID-19 AG SELF TEST Kit    carvediloL (COREG) 25 MG tablet    cloNIDine 0.2 mg/24 hr td ptwk (CATAPRES) 0.2 mg/24 hr    FLUoxetine 20 MG capsule    gabapentin (NEURONTIN) 100 MG capsule    hydrALAZINE (APRESOLINE) 100 MG tablet    HYDROcodone-acetaminophen (NORCO)  mg per tablet    HYDROcodone-acetaminophen (NORCO) 5-325 mg per tablet    insulin detemir U-100 (LEVEMIR) 100 unit/mL injection    isosorbide mononitrate (IMDUR) 60 MG 24 hr  tablet    levETIRAcetam (KEPPRA) 500 MG Tab    mirtazapine (REMERON SOL-TAB) 15 MG disintegrating tablet    ondansetron (ZOFRAN) 4 MG tablet    ondansetron (ZOFRAN-ODT) 4 MG TbDL    ondansetron (ZOFRAN-ODT) 4 MG TbDL    pantoprazole (PROTONIX) 40 MG tablet     Family History       Problem Relation (Age of Onset)    Diabetes Mother, Father          Tobacco Use    Smoking status: Never    Smokeless tobacco: Never   Substance and Sexual Activity    Alcohol use: Not Currently    Drug use: No    Sexual activity: Not Currently     Partners: Male     Birth control/protection: I.U.D.     Review of Systems   Constitutional: Negative.    HENT: Negative.     Eyes: Negative.    Respiratory: Negative.     Cardiovascular:  Positive for chest pain.   Gastrointestinal:  Positive for abdominal pain.   Genitourinary:  Positive for decreased urine volume (anuric at baseline).   Musculoskeletal:  Positive for back pain.   Skin: Negative.    Neurological: Negative.    Psychiatric/Behavioral: Negative.     Objective:     Vital Signs (Most Recent):  Temp: 98.1 °F (36.7 °C) (04/10/23 1034)  Pulse: 90 (04/10/23 1318)  Resp: 20 (04/10/23 1058)  BP: (!) 181/110 (04/10/23 1302)  SpO2: 100 % (04/10/23 1318)   Vital Signs (24h Range):  Temp:  [98.1 °F (36.7 °C)-98.2 °F (36.8 °C)] 98.1 °F (36.7 °C)  Pulse:  [87-97] 90  Resp:  [20-36] 20  SpO2:  [97 %-100 %] 100 %  BP: (181-228)/() 181/110     Weight: 54 kg (119 lb) (04/10/23 0954)  Body mass index is 21.77 kg/m².  Body surface area is 1.54 meters squared.    No intake/output data recorded.    Physical Exam  Constitutional:       Appearance: Normal appearance.   HENT:      Head: Normocephalic and atraumatic.      Nose: Nose normal.      Mouth/Throat:      Mouth: Mucous membranes are moist.      Pharynx: Oropharynx is clear.   Eyes:      Conjunctiva/sclera: Conjunctivae normal.   Cardiovascular:      Rate and Rhythm: Normal rate and regular rhythm.      Comments: OPAL QUEEN    Pulmonary:      Effort: Pulmonary effort is normal.      Breath sounds: Normal breath sounds.   Abdominal:      General: Abdomen is flat.   Musculoskeletal:         General: Normal range of motion.      Cervical back: Normal range of motion and neck supple.   Skin:     General: Skin is warm and dry.   Neurological:      General: No focal deficit present.      Mental Status: She is alert and oriented to person, place, and time.   Psychiatric:         Mood and Affect: Mood normal.         Behavior: Behavior normal.       Significant Labs:  CBC:   Recent Labs   Lab 04/10/23  1031   WBC 4.57   RBC 2.81*   HGB 8.4*   HCT 26.0*   *   MCV 93   MCH 29.9   MCHC 32.3     CMP:   Recent Labs   Lab 04/10/23  1031   *   CALCIUM 9.0   ALBUMIN 3.5   PROT 7.6      K 4.4   CO2 21*   CL 98   BUN 27*   CREATININE 6.1*   ALKPHOS 431*   ALT 47*   AST 34   BILITOT 1.7*     All labs within the past 24 hours have been reviewed.      Assessment/Plan:     Renal/  ESRD (end stage renal disease) on dialysis  Outpatient HD Information:    -Outpatient HD unit: [facility]   -HD tx days: TTS  -HD tx time: 4hrs   -Last HD tx prior to presentation: 4/8/23  -HD access: R IJ TDC   -HD modality: iHD   -Residual urine: Anuric       Plan/Recommendations:    -Plan for HD tomorrow, per patient's TTS HD schedule   -Renal diet  -Strict I/O's and daily weights  -Daily renal function panels   -Renally dose meds        Chronic kidney disease-mineral and bone disorder  -Low phos diet   -No indication for phos binders at this time     Oncology  Anemia of chronic kidney failure, stage 5  -Target Hg of 10-12  -Transfuse for Hg <7.0        Thank you for your consult. I will follow-up with patient. Please contact us if you have any additional questions.    Henri Montoya NP  Nephrology  [facility] - Emergency Dept

## 2023-04-10 NOTE — ED TRIAGE NOTES
Pt presents to the ED from clinic for hyperglycemia. Pt CBG >600. Pt reports severe abdominal pain w/ N/V. Hx sickle cell, CKD, CHF, and type II DM.

## 2023-04-10 NOTE — ASSESSMENT & PLAN NOTE
- Mild DKA versus stress related hyperglycemia with unclear trigger; patient states adherence to insulin therapy with the exception of this morning when she missed her morning Humalog  - High-risk of hypoglycemia given ESRD; has reportedly had recurring episodes of hypoglycemia home    - Continue intensive insulin drip for now and likely until tomorrow morning to better understand insulin requirement  - Q1H accuchecks while on intensive insulin drip  - Start clear liquid sugar-free diet if patient can tolerate    - Aggressive IV fluids, recommend checking BMP Q6 hr until further normalization  - Aggressive correction of electrolytes during this time, especially potassium, magnesium, and phosphorus  - Will consider converting to subcutaneous or transition insulin drip only if all below are met:  - Once bicarbonate greater than 18  - Anion gap is less than 12 on two subsequent BMP  - Glucose less is than 200 on two sequential reads while on steady insulin rate (usually 1-3 units per hour for several hours)  - Tolerating diet without nausea or vomiting

## 2023-04-10 NOTE — HPI
Pt is a 33 y.o. 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 Pt reports abdominal pain began 3 days ago and has progressively worsened. She also reports chest pain and low central back pain that also began 3 days ago. With regards to chest pain patient explains it feels like pressure and has been consistent since onset, also endorses orthopnea. Pt is unable to described abdominal pain due to being in significant pain at time of exam. Of note pt was recently admitted for abdominal pain, was discharged from this admission on 4/8, she states that this episode of pain is different from her prior admission. Pt denies missing any dialysis sessions, reports no changes in her diet prior to onset of abdominal pain or N/V. States that her glucose has been in the 200s for the past 3 days concurrently with the abdominal pain, and that she has not missed any doses of home insulin.     While in ED CTAP was obtained, negative for bowel obstruction, significant for bladder wall thickening concerning for cystitis. BNP elevated to 2,187, CXR with marked cardiomegaly, but there has been no significant detrimental interval change, no pleural effusion. Initial trop elevated to 0.042, now down trending. Glucose elevated to 509, B hydroxybutarate elevated, anion gap elevated. Pt admitted to hospital medicine with DKA, endocrine consult by ED, recommended insulin gtt and K containing IVF.

## 2023-04-10 NOTE — ASSESSMENT & PLAN NOTE
Pt with ESRD on dialysis T/Th/S. Pt reports having not missed a dialysis session in the past week. Pt does not make urine     -nephrology consulted for dialysis, will appreciate recs  -low threshold to alter nephrology for urgent HD given pt will receive IVF per DKA protocol; monitor for symptoms of volume overload

## 2023-04-10 NOTE — ED PROVIDER NOTES
Encounter Date: 4/10/2023       History     Chief Complaint   Patient presents with    Hyperglycemia     Sent from clinic , abd pain, vomiting glucose over 600 in clinic     34 yo female presenting with hypertension, abdominal pain, vomiting.    PMH:  Gastroparesis, SVT, sickle cell trait, DM type 2, hypertensive emergency, CHF, ESRD (HD , ,S - last dialyzed Saturday).     Context:  Patient was referred from endocrine clinic for high blood sugar and hypertension.  Patient endorses generalized abdominal pain and multiple episodes of vomiting.   Onset:  Gradual  Location:  Diffuse abdomen  Duration:  Today   Associated Symptoms:  Denies fever       The history is provided by the patient, medical records and a parent. No  was used.   Review of patient's allergies indicates:   Allergen Reactions    Penicillins Hives     Past Medical History:   Diagnosis Date    CKD (chronic kidney disease), stage IV 2022    Diabetes mellitus due to underlying condition with unspecified complications 2022    Gastroparesis 2022    Heart failure with preserved ejection fraction 2022    EF 55% on 3/22    History of gastroesophageal reflux (GERD)     History of supraventricular tachycardia     Hyperkalemia 2022    Hypertensive emergency 2022    Sickle cell trait 2022    Type 2 diabetes mellitus      Past Surgical History:   Procedure Laterality Date     SECTION      x 3    COLONOSCOPY      COLONOSCOPY N/A 2022    Procedure: COLONOSCOPY;  Surgeon: Jagdeep Cedeno MD;  Location: [facility];  Service: Endoscopy;  Laterality: N/A;    ESOPHAGOGASTRODUODENOSCOPY N/A 10/18/2019    Procedure: ESOPHAGOGASTRODUODENOSCOPY (EGD);  Surgeon: Gianluca Mendez MD;  Location: [facility];  Service: Endoscopy;  Laterality: N/A;    ESOPHAGOGASTRODUODENOSCOPY N/A 2022    Procedure: EGD (ESOPHAGOGASTRODUODENOSCOPY);  Surgeon: Micky Paredes III, MD;  Location: [facility];  Service:  Endoscopy;  Laterality: N/A;    ESOPHAGOGASTRODUODENOSCOPY N/A 12/5/2022    Procedure: EGD (ESOPHAGOGASTRODUODENOSCOPY);  Surgeon: Marcelo Zhong MD;  Location: [facility] ENDO;  Service: Endoscopy;  Laterality: N/A;    LAPAROSCOPIC CHOLECYSTECTOMY N/A 07/30/2022    Procedure: CHOLECYSTECTOMY, LAPAROSCOPIC;  Surgeon: Grey Perez MD;  Location: [facility] OR;  Service: General;  Laterality: N/A;    PLACEMENT OF DUAL-LUMEN VASCULAR CATHETER Left 07/12/2022    Procedure: INSERTION-CATHETER-JOSEPH;  Surgeon: Dionte Gan MD;  Location: [facility] OR;  Service: General;  Laterality: Left;    PLACEMENT OF DUAL-LUMEN VASCULAR CATHETER Right 07/26/2022    Procedure: INSERTION-CATHETER-Hemosplit;  Surgeon: Dionte Gan MD;  Location: [facility] OR;  Service: General;  Laterality: Right;     Family History   Problem Relation Age of Onset    Diabetes Mother     Diabetes Father      Social History     Tobacco Use    Smoking status: Never    Smokeless tobacco: Never   Substance Use Topics    Alcohol use: Not Currently    Drug use: No     Review of Systems   Constitutional:  Negative for fever.   Gastrointestinal:  Positive for abdominal pain and vomiting.     Physical Exam     Initial Vitals [04/10/23 0954]   BP Pulse Resp Temp SpO2   (!) 216/118 95 (!) 24 98.2 °F (36.8 °C) 99 %      MAP       --         Physical Exam    Nursing note and vitals reviewed.  Constitutional: She is not diaphoretic. She appears distressed.   Appears uncomfortable secondary to pain   HENT:   Head: Normocephalic and atraumatic.   Eyes: Right eye exhibits no discharge. Left eye exhibits no discharge.   Neck: Neck supple. No tracheal deviation present.   Cardiovascular:  Normal rate and regular rhythm.           Pulmonary/Chest: Breath sounds normal. No respiratory distress.   Right chest wall dialysis access c/d/I      Abdominal: Abdomen is soft. There is no abdominal tenderness.   Musculoskeletal:         General: No edema.      Cervical back: Neck supple.      Neurological: She is alert and oriented to person, place, and time.   Skin: Skin is warm. No rash noted.   Psychiatric:   Tearful        ED Course   Procedures  Labs Reviewed   CBC W/ AUTO DIFFERENTIAL - Abnormal; Notable for the following components:       Result Value    RBC 2.81 (*)     Hemoglobin 8.4 (*)     Hematocrit 26.0 (*)     RDW 18.7 (*)     Platelets 100 (*)     Immature Granulocytes 1.1 (*)     Immature Grans (Abs) 0.05 (*)     Lymph # 0.8 (*)     nRBC 1 (*)     Lymph % 17.5 (*)     All other components within normal limits   COMPREHENSIVE METABOLIC PANEL - Abnormal; Notable for the following components:    CO2 21 (*)     Glucose 509 (*)     BUN 27 (*)     Creatinine 6.1 (*)     Total Bilirubin 1.7 (*)     Alkaline Phosphatase 431 (*)     ALT 47 (*)     Anion Gap 17 (*)     eGFR 8.7 (*)     All other components within normal limits    Narrative:     add on hcgq per Emil Landrum MD order# [id number] @  04/10/2023    12:03    TROPONIN I - Abnormal; Notable for the following components:    Troponin I 0.042 (*)     All other components within normal limits   B-TYPE NATRIURETIC PEPTIDE - Abnormal; Notable for the following components:    BNP 2,187 (*)     All other components within normal limits    Narrative:     add on hcgq per Emil Landrum MD order# [id number] @  04/10/2023    12:03    BETA - HYDROXYBUTYRATE, SERUM - Abnormal; Notable for the following components:    Beta-Hydroxybutyrate 0.9 (*)     All other components within normal limits   TROPONIN I - Abnormal; Notable for the following components:    Troponin I 0.035 (*)     All other components within normal limits    Narrative:     add on phos per Henri Montoya NP order# [id number] 04/10/2023 @   14:30    PHOSPHORUS - Abnormal; Notable for the following components:    Phosphorus 5.1 (*)     All other components within normal limits    Narrative:     add on phos per Henri Montoya NP order# [id number] 04/10/2023 @   14:30    ISTAT  PROCEDURE - Abnormal; Notable for the following components:    POC PCO2 49.8 (*)     POC PO2 19 (*)     POC HCO3 29.0 (*)     POC SATURATED O2 27 (*)     POC TCO2 30 (*)     All other components within normal limits   POCT GLUCOSE - Abnormal; Notable for the following components:    POCT Glucose 345 (*)     All other components within normal limits   POCT GLUCOSE - Abnormal; Notable for the following components:    POCT Glucose 165 (*)     All other components within normal limits   POCT GLUCOSE - Abnormal; Notable for the following components:    POCT Glucose 41 (*)     All other components within normal limits   MAGNESIUM    Narrative:     add on hcgq per Emil Landrum MD order# [id number] @  04/10/2023    12:03    LIPASE    Narrative:     add on hcgq per Emil Landrum MD order# [id number] @  04/10/2023    12:03    HCG, QUANTITATIVE   HCG, QUANTITATIVE    Narrative:     add on hcgq per Emil Landrum MD order# [id number] @  04/10/2023    12:03    PHOSPHORUS   LACTIC ACID, PLASMA   URINALYSIS, REFLEX TO URINE CULTURE   BASIC METABOLIC PANEL   PREGNANCY TEST SCREENING, SERUM   POCT GLUCOSE MONITORING CONTINUOUS     EKG Readings: (Independently Interpreted)   Initial Reading: No STEMI. Rhythm: Normal Sinus Rhythm. Heart Rate: 95. Axis: Left Axis Deviation. Clinical Impression: Normal Sinus Rhythm   ECG Results              EKG 12-lead (Final result)  Result time 04/10/23 12:03:38      Final result by Interface, Lab In [facility] (04/10/23 12:03:38)                   Narrative:    Test Reason : R11.10,    Vent. Rate : 095 BPM     Atrial Rate : 095 BPM     P-R Int : 172 ms          QRS Dur : 082 ms      QT Int : 386 ms       P-R-T Axes : 044 -44 072 degrees     QTc Int : 485 ms    Normal sinus rhythm  Possible Left atrial enlargement  Left axis deviation  Minimal voltage criteria for LVH, may be normal variant ( R in aVL )  Possible Anterior infarct ,age undetermined  Abnormal ECG  When compared with ECG of 08-APR-2023  22:15,  No significant change was found  Confirmed by Cuong Smith MD ([id number]) on 4/10/2023 12:03:30 PM    Referred By:             Confirmed By:Cuong Smith MD                                  Imaging Results               CT Abdomen Pelvis  Without Contrast (Final result)  Result time 04/10/23 14:11:08      Final result by Guanako Gipson MD (04/10/23 14:11:08)                   Impression:      1. No CT findings identified to explain patient's symptoms of nausea/vomiting.  Specifically, no convincing evidence of bowel obstruction or acute inflammation.  2. Urinary bladder moderate degree of circumferential wall thickening, increased from 04/08/2023 study, concerning for worsening nonspecific cystitis.  Clinical correlation and with urinalysis advised.  3. Hepatosplenomegaly.  4. Cholecystectomy.  5. Similar cardiomegaly with nonspecific moderate pericardial fluid.  6. Grossly similar suspected fluid volume overload including mesenteric edema, small volume abdominopelvic ascites and body wall edema/anasarca.  7. Grossly stable additional findings as above.  This report was flagged in Epic as abnormal.      Electronically signed by: Guanako Gipson MD  Date:    04/10/2023  Time:    14:11               Narrative:    EXAMINATION:  CT ABDOMEN PELVIS WITHOUT CONTRAST    CLINICAL HISTORY:  Nausea/vomiting;    TECHNIQUE:  Low dose axial images, sagittal and coronal reformations were obtained from the lung bases to the pubic symphysis.  No contrast media was utilized.    COMPARISON:  CT abdomen and pelvis most recent 04/08/2023, gallbladder ultrasound 11/19/2022    FINDINGS:  Lack of IV contrast limits evaluation of soft tissue and vascular structures.    Grossly similar partially imaged scattered areas of platelike scarring versus atelectasis superimposed upon dependent atelectasis within the left lower lobe, with newly developed minimal dependent atelectasis and discoid atelectasis within the right lower lobe.  No pleural  effusion.    Heart remains enlarged with grossly similar persistent moderate nonspecific pericardial fluid.  Distal portion of a central venous catheter terminates in the right ventricle similar to prior.    Remote cholecystectomy.  No biliary ductal dilatation.  Liver and spleen remain mildly enlarged without focal process seen.  Pancreas, stomach, duodenum and bilateral adrenal glands are grossly within normal limits.    Bilateral kidneys are stable in overall size, shape and location.  Left renal vascular calcifications noted.  No radiodense calculus seen within the urinary tract.  No hydronephrosis.  Grossly similar nonspecific mild perinephric stranding about the left kidney.  Ureters are nondilated.  Urinary bladder is mildly distended with moderate circumferential wall thickening slightly worse from prior.  Small volume nonspecific free fluid throughout the pelvis.  No pelvic mass definitively seen.  Right pelvic metallic clip unchanged.  Few scattered subcentimeter pelvic phleboliths noted.    Diffuse mesenteric haziness suggesting mesenteric edema similar to prior.  There is small volume free fluid tracking along the bilateral pericolic gutters to the mesenteric root and about the inferior right hepatic lobe, not simply changed.    No free air.  No definite lymphadenopathy allowing for lack of IV contrast and closely apposed loops of bowel with paucity of intra-abdominal fat.    No significant aortic atherosclerosis.  Aorta tapers normally.  Grossly stable prominent wall calcification of multiple small to medium sized arteries within the abdomen and pelvis.    Appendix and terminal ileum are within normal limits.  No evidence of bowel obstruction or acute inflammation.  No pneumatosis or portal venous gas.    Similar diffuse subcutaneous fat stranding of the body wall consistent with anasarca.    Osseous structures appear stable without acute process seen.                                       X-Ray Chest  AP Portable (Final result)  Result time 04/10/23 11:43:41      Final result by Nura Mckinney MD (04/10/23 11:43:41)                   Impression:      Continued demonstration of marked cardiomegaly, but there has been no significant detrimental interval change in the appearance of the chest since 04/08/2023 at 21:56.      Electronically signed by: Nura Mckinney MD  Date:    04/10/2023  Time:    11:43               Narrative:    EXAMINATION:  XR CHEST AP PORTABLE    CLINICAL HISTORY:  hyperglycemia;    TECHNIQUE:  One view    COMPARISON:  Comparison is made to 04/08/2023 at 21:56.  Clinical information of hyperglycemia and hypertension.    FINDINGS:  Vascular catheter again noted, tips in the right atrium.  Cardiomediastinal silhouette is again seen to be markedly prominent, representing cardiomegaly and/or pericardial fluid.  The cardiomediastinal silhouette appears unchanged from the prior exam.  Pulmonary vascularity is normal, and there are no findings indicating current cardiac decompensation/volume overload.  Lung zones remain essentially clear and stable, free of superimposed airspace consolidation or volume loss.  No pleural fluid.  No pneumothorax.                                       Medications   glucagon (human recombinant) injection 1 mg (has no administration in time range)   dextrose 40 % gel 15,000 mg (has no administration in time range)   insulin regular in 0.9 % NaCl 100 unit/100 mL (1 unit/mL) infusion (0.05 Units/kg/hr × 54 kg Intravenous Rate/Dose Change 4/10/23 1604)   dextrose 40 % gel 30,000 mg (has no administration in time range)   dextrose 10% bolus 125 mL 125 mL (has no administration in time range)   dextrose 10% bolus 250 mL 250 mL (has no administration in time range)   albuterol inhaler 2 puff (has no administration in time range)   amLODIPine tablet 5 mg (has no administration in time range)   apixaban tablet 5 mg (has no administration in time range)   carvediloL tablet 25 mg (has no  administration in time range)   FLUoxetine capsule 20 mg (has no administration in time range)   gabapentin capsule 300 mg (has no administration in time range)   isosorbide mononitrate 24 hr tablet 60 mg (has no administration in time range)   hydrALAZINE tablet 100 mg (has no administration in time range)   levETIRAcetam tablet 500 mg (has no administration in time range)   mirtazapine disintegrating tablet 15 mg (has no administration in time range)   pantoprazole EC tablet 40 mg (has no administration in time range)   naloxone 0.4 mg/mL injection 0.02 mg (has no administration in time range)   HYDROmorphone injection 1 mg (1 mg Intravenous Given 4/10/23 1605)   metoclopramide HCl injection 10 mg (has no administration in time range)   ondansetron injection 4 mg (has no administration in time range)   sodium chloride 0.9% flush 10 mL (has no administration in time range)   acetaminophen tablet 650 mg (has no administration in time range)   0.9%  NaCl infusion (has no administration in time range)   dextrose 5 % and 0.45 % NaCl infusion ( Intravenous New Bag 4/10/23 1656)   sodium chloride 0.9% flush 10 mL (has no administration in time range)   ondansetron injection 4 mg (4 mg Intravenous Given 4/10/23 1030)   morphine injection 2 mg (2 mg Intravenous Given 4/10/23 1029)   morphine injection 2 mg (2 mg Intravenous Given 4/10/23 1058)   carvediloL tablet 25 mg (25 mg Oral Given 4/10/23 1341)   amLODIPine tablet 10 mg (10 mg Oral Given 4/10/23 1139)   0.9 % NaCl with KCl 20 mEq infusion (0 mL/hr Intravenous Stopped 4/10/23 1621)     Medical Decision Making:   History:   Old Medical Records: I decided to obtain old medical records.  Old Records Summarized: other records.       <> Summary of Records: Outpatient endocrinology note reviewed - referred to ED for hyperglycemia, concern for DKA.  Initial Assessment:   Emergent evaluation of a 32 yo female p/w N/V, hyperglycemia, abdominal pain.  On arrival, abdomen is  non-peritonitic.  Plan for lab evaluation, sx control, CT to assess for emergent intra-abdominal pathology.   Differential Diagnosis:   Including, but not limited to:    Hyperglycemia  DKA  SBO  Gastroparesis    Independently Interpreted Test(s):   I have ordered and independently interpreted X-rays - see summary below.       <> Summary of X-Ray Reading(s): No significant change   I have ordered and independently interpreted EKG Reading(s) - see prior notes  Clinical Tests:   Lab Tests: Reviewed and Ordered  Radiological Study: Reviewed and Ordered  Medical Tests: Reviewed and Ordered  ED Management:  Labs notable for hyperglycemia with elevated AG and BHB.  I discussed the case with endocrinology - recommend insulin gtt and slow infusion of NS with 20 mEq of Kcl with frequent lab checks.  Consult placed to nephrology as well, as patient will be due for dialysis tomorrow, does not need emergent dialysis at this time.  Will need to monitor glucose and K levels closely given she is ESRD.  Tolerated missed oral anti-hypertensive medication and BP improved with pain control as well.  No emergent surgical findings on CT, favor gastroparesis as etiology.  Case discussed with Hospital Medicine for admission and further management.     ED Diagnoses:  Hyperglycemia  DKA w/o coma  Abdominal pain    Other:   I have discussed this case with another health care provider.           ED Course as of 04/10/23 1718   Mon Apr 10, 2023   1145 POC PH: 7.372 [AB]   1146 Hemoglobin(!): 8.4  Stable anemia  [AB]   1146 WBC: 4.57  No leukocytosis  [AB]   1200 Troponin I(!): 0.042  Lower than prior  [AB]   1250 Glucose(!!): 509  Hyperglycemia  [AB]   1340 Discussed case with Endocrinology - recommend insulin gtt and slow repletion of potassium with very close monitoring.  [AB]   1351 Potassium: 4.4 [AB]      ED Course User Index  [AB] Emil Landrum MD                 Clinical Impression:   Final diagnoses:  [R11.10] Vomiting  [R07.9] Chest  pain  [R73.9] Hyperglycemia (Primary)  [R10.9] Abdominal pain, unspecified abdominal location        ED Disposition Condition    Observation Stable                Emil Landrum MD  04/10/23 3503

## 2023-04-10 NOTE — ASSESSMENT & PLAN NOTE
Pt with hx of HTN, home regimen including norvasc 10mg, coreg 25mg, clonidine patch, hydralazine 100mg, imdur 60mg. /111 at time of initial exam, considerable pain likely contributing to elevation     -coreg 25mg daily  -amlodipine 5mg daily  -hydralazine 100mg q12h  -imdur 60mg daily

## 2023-04-10 NOTE — H&P
[facility] - Emergency Dept  [facility] Medicine  History & Physical    Patient Name: Tabby Howard  MRN: [id number]  Patient Class: OP- Observation  Admission Date: 4/10/2023  Attending Physician: Sharif Barone MD   Primary Care Provider: AIDEN CONNER NP         Patient information was obtained from patient, past medical records and ER records.     Subjective:     Principal Problem:DKA (diabetic ketoacidosis)    Chief Complaint:   Chief Complaint   Patient presents with    Hyperglycemia     Sent from clinic , abd pain, vomiting glucose over 600 in clinic        HPI: Pt is a 33 y.o. female PMH of ESRD on HD T/Th/S, T2DM on insulin, HFpEF, HTN, unknown seizure disorder, DVT of LUE on apixiban, who presented to [facility] ED after prompting from oupt endocrinology appointment when pt was discovered to have glucose >500 accompanied by N/V and abdominal pain. Pt reports abdominal pain began 3 days ago and has progressively worsened. She also reports chest pain and low central back pain that also began 3 days ago. With regards to chest pain patient explains it feels like pressure and has been consistent since onset, also endorses orthopnea. Pt is unable to described abdominal pain due to being in significant pain at time of exam. Of note pt was recently admitted for abdominal pain, was discharged from this admission on 4/8, she states that this episode of pain is different from her prior admission. Pt denies missing any dialysis sessions, reports no changes in her diet prior to onset of abdominal pain or N/V. States that her glucose has been in the 200s for the past 3 days concurrently with the abdominal pain, and that she has not missed any doses of home insulin.     While in ED CTAP was obtained, negative for bowel obstruction, significant for bladder wall thickening concerning for cystitis. BNP elevated to 2,187, CXR with marked cardiomegaly, but there has been no significant detrimental interval change, no pleural  effusion. Initial trop elevated to 0.042, now down trending. Glucose elevated to 509, B hydroxybutarate elevated, anion gap elevated. Pt admitted to hospital medicine with DKA, endocrine consult by ED, recommended insulin gtt and K containing IVF.       Past Medical History:   Diagnosis Date    CKD (chronic kidney disease), stage IV 2022    Diabetes mellitus due to underlying condition with unspecified complications 2022    Gastroparesis 2022    Heart failure with preserved ejection fraction 2022    EF 55% on 3/22    History of gastroesophageal reflux (GERD)     History of supraventricular tachycardia     Hyperkalemia 2022    Hypertensive emergency 2022    Sickle cell trait 2022    Type 2 diabetes mellitus        Past Surgical History:   Procedure Laterality Date     SECTION      x 3    COLONOSCOPY      COLONOSCOPY N/A 2022    Procedure: COLONOSCOPY;  Surgeon: Jagdeep Cedeno MD;  Location: [facility];  Service: Endoscopy;  Laterality: N/A;    ESOPHAGOGASTRODUODENOSCOPY N/A 10/18/2019    Procedure: ESOPHAGOGASTRODUODENOSCOPY (EGD);  Surgeon: Gianluca Mendez MD;  Location: [facility];  Service: Endoscopy;  Laterality: N/A;    ESOPHAGOGASTRODUODENOSCOPY N/A 2022    Procedure: EGD (ESOPHAGOGASTRODUODENOSCOPY);  Surgeon: Micky Paredes III, MD;  Location: [facility];  Service: Endoscopy;  Laterality: N/A;    ESOPHAGOGASTRODUODENOSCOPY N/A 2022    Procedure: EGD (ESOPHAGOGASTRODUODENOSCOPY);  Surgeon: Marcelo Zhong MD;  Location: [facility];  Service: Endoscopy;  Laterality: N/A;    LAPAROSCOPIC CHOLECYSTECTOMY N/A 2022    Procedure: CHOLECYSTECTOMY, LAPAROSCOPIC;  Surgeon: Grey Perez MD;  Location: [facility];  Service: General;  Laterality: N/A;    PLACEMENT OF DUAL-LUMEN VASCULAR CATHETER Left 2022    Procedure: INSERTION-CATHETER-JOSEPH;  Surgeon: Dionte Gan MD;  Location: [facility];  Service: General;  Laterality:  Left;    PLACEMENT OF DUAL-LUMEN VASCULAR CATHETER Right 07/26/2022    Procedure: INSERTION-CATHETER-Hemosplit;  Surgeon: Dionte Gan MD;  Location: [facility];  Service: General;  Laterality: Right;       Review of patient's allergies indicates:   Allergen Reactions    Penicillins Hives       No current facility-administered medications on file prior to encounter.     Current Outpatient Medications on File Prior to Encounter   Medication Sig    albuterol (PROVENTIL/VENTOLIN HFA) 90 mcg/actuation inhaler Inhale 2 puffs into the lungs every 6 (six) hours as needed for Wheezing. Rescue    amLODIPine (NORVASC) 10 MG tablet Take 1 tablet every day by oral route for 30 days.    apixaban (ELIQUIS) 5 mg Tab Take 1 tablet (5 mg total) by mouth 2 (two) times daily.    carvediloL (COREG) 25 MG tablet Take 1 tablet twice a day by oral route for 30 days.    cloNIDine 0.2 mg/24 hr td ptwk (CATAPRES) 0.2 mg/24 hr Apply 1 patch to skin once every week.    FLUoxetine 20 MG capsule Take 1 capsule every day by oral route for 30 days.    gabapentin (NEURONTIN) 100 MG capsule Take 1 capsule by mouth 3 times daily    hydrALAZINE (APRESOLINE) 100 MG tablet Take 1 tablet twice a day by oral route for 30 days.    HYDROcodone-acetaminophen (NORCO)  mg per tablet Take 1 tablet by mouth every 6 (six) hours as needed for Pain.    HYDROcodone-acetaminophen (NORCO) 5-325 mg per tablet Take 1 tablet by mouth every 6 (six) hours as needed for Pain.    insulin detemir U-100 (LEVEMIR) 100 unit/mL injection Inject 5 Units into the skin every evening.    isosorbide mononitrate (IMDUR) 60 MG 24 hr tablet Take 1 tablet every day by oral route for 30 days.    levETIRAcetam (KEPPRA) 500 MG Tab Take 1 tablet twice a day by oral route for 30 days.    mirtazapine (REMERON SOL-TAB) 15 MG disintegrating tablet Take 1 tablet (15 mg total) by mouth nightly.    ondansetron (ZOFRAN) 4 MG tablet Take 1 tablet (4 mg total) by mouth every 6  (six) hours as needed.    ondansetron (ZOFRAN-ODT) 4 MG TbDL Place1 tablet on the tongue every 8 hours as needed    ondansetron (ZOFRAN-ODT) 4 MG TbDL Take 1 tablet (4 mg total) by mouth every 6 (six) hours as needed.    pantoprazole (PROTONIX) 40 MG tablet Take 1 tablet every day by oral route for 30 days.    [DISCONTINUED] atenoloL (TENORMIN) 50 MG tablet Take 1 tablet (50 mg total) by mouth every other day.    [DISCONTINUED] BINAXNOW COVID-19 AG SELF TEST Kit Take 1 Dose by mouth once.    [DISCONTINUED] omeprazole (PRILOSEC) 20 MG capsule Take 2 capsules (40 mg total) by mouth once daily. for 10 days     Family History       Problem Relation (Age of Onset)    Diabetes Mother, Father          Tobacco Use    Smoking status: Never    Smokeless tobacco: Never   Substance and Sexual Activity    Alcohol use: Not Currently    Drug use: No    Sexual activity: Not Currently     Partners: Male     Birth control/protection: I.U.D.     Review of Systems   Constitutional:  Positive for appetite change. Negative for activity change, chills and fever.   HENT:  Negative for congestion, rhinorrhea, sinus pressure, sinus pain, sneezing and sore throat.    Eyes:  Positive for visual disturbance.   Respiratory:  Positive for chest tightness. Negative for apnea, cough, choking and shortness of breath.    Cardiovascular:  Positive for chest pain. Negative for palpitations and leg swelling.   Gastrointestinal:  Positive for abdominal pain, nausea and vomiting. Negative for abdominal distention, constipation and diarrhea.   Endocrine: Negative for polyphagia and polyuria.   Genitourinary:  Negative for dysuria, flank pain and urgency.   Musculoskeletal:  Positive for back pain. Negative for arthralgias, joint swelling, myalgias, neck pain and neck stiffness.   Neurological:  Negative for dizziness, weakness, light-headedness and headaches.   Psychiatric/Behavioral:  Negative for agitation. The patient is nervous/anxious.     Objective:     Vital Signs (Most Recent):  Temp: 98.1 °F (36.7 °C) (04/10/23 1034)  Pulse: 87 (04/10/23 1502)  Resp: 20 (04/10/23 1058)  BP: (!) 203/116 (04/10/23 1502)  SpO2: 100 % (04/10/23 1502)   Vital Signs (24h Range):  Temp:  [98.1 °F (36.7 °C)-98.2 °F (36.8 °C)] 98.1 °F (36.7 °C)  Pulse:  [87-97] 87  Resp:  [20-36] 20  SpO2:  [97 %-100 %] 100 %  BP: (181-228)/() 203/116     Weight: 54 kg (119 lb)  Body mass index is 21.77 kg/m².    Physical Exam  Constitutional:       General: She is in acute distress.      Appearance: She is ill-appearing. She is not diaphoretic.   HENT:      Head: Normocephalic and atraumatic.      Mouth/Throat:      Mouth: Mucous membranes are moist.      Pharynx: Oropharynx is clear.   Eyes:      Conjunctiva/sclera: Conjunctivae normal.   Cardiovascular:      Rate and Rhythm: Tachycardia present.      Pulses: Normal pulses.      Heart sounds: Normal heart sounds.   Pulmonary:      Effort: Pulmonary effort is normal. No respiratory distress.      Breath sounds: Normal breath sounds. No stridor. No wheezing or rhonchi.   Abdominal:      General: Abdomen is flat. There is no distension.      Palpations: Abdomen is soft.      Tenderness: There is abdominal tenderness. There is no guarding or rebound.      Comments: Hypoactive bowel sounds, diffuse tenderness to palpation   Musculoskeletal:         General: Normal range of motion.      Cervical back: Normal range of motion and neck supple.      Right lower leg: No edema.      Left lower leg: No edema.   Skin:     General: Skin is warm and dry.   Neurological:      General: No focal deficit present.      Mental Status: She is oriented to person, place, and time.   Psychiatric:      Comments: Tearful, in significant distress at time of exam. Very cooperative despite pain           Significant Labs: All pertinent labs within the past 24 hours have been reviewed.    Significant Imaging: I have reviewed all pertinent imaging  results/findings within the past 24 hours.    Assessment/Plan:     * DKA (diabetic ketoacidosis)  Patient's FSGs are uncontrolled due to hyperglycemia on current medication regimen. B hydroxybutarate elevated to 0.9. Anion gap of 17. Initial glucose of 509   Last A1c reviewed-   Lab Results   Component Value Date    HGBA1C 5.8 03/03/2023     Most recent fingerstick glucose reviewed-   Recent Labs   Lab 04/10/23  1441 04/10/23  1555   POCTGLUCOSE 345* 165*     Current correctional scale  insulin gtt  Maintain anti-hyperglycemic dose as follows-   Antihyperglycemics (From admission, onward)    Start     Stop Route Frequency Ordered    04/10/23 1315  insulin regular in 0.9 % NaCl 100 unit/100 mL (1 unit/mL) infusion  (INSULIN INFUSIONS)        Question:  Enter initial dose from Infusion Protocol Chart (Units/hr):  Answer:  5    -- IV Continuous 04/10/23 1313        Hold Oral hypoglycemics while patient is in the hospital.  -Endocrinology consulted, will appreciate recs  -caution with IVF administration given ESRD and elevated BNP  -if gap closed with follow up BMP, will continue insulin gtt x2 hours and transition to subq     Mood disorder  Cont home mirtazipine, fluoxetine       Chronic deep vein thrombosis (DVT) of left upper extremity  Cont home apixiban       Anemia in ESRD (end-stage renal disease)  Pt with known history of anemia in setting of ESRD. Hgb of 8.4 at time of exam     -trend daily CBC   -transfuse if Hbg <7      Congestive heart failure with left ventricular diastolic dysfunction  Patient is identified as having Diastolic (HFpEF) heart failure that is Acute on chronic. CHF is currently uncontrolled due to >3 pillow orthopnea. Latest ECHO performed and demonstrates- Results for orders placed during the hospital encounter of 01/12/23    Echo    Interpretation Summary  · The left ventricle is normal in size with moderate concentric hypertrophy and normal systolic function.  · The estimated ejection  fraction is 55%.  · Grade III left ventricular diastolic dysfunction.  · Normal right ventricular size with normal right ventricular systolic function.  · Moderate left atrial enlargement.  · Moderate to severe tricuspid regurgitation.  · Mild to moderate pulmonic regurgitation.  · Mild mitral regurgitation.  · Normal central venous pressure (3 mmHg).  · The estimated PA systolic pressure is 56 mmHg.  · There is pulmonary hypertension.  · Moderate to large circumferential pericardial effusion. No evidence of tamponade.  . Continue Beta Blocker and monitor clinical status closely. Monitor on telemetry. Patient is off CHF pathway.  Monitor strict Is&Os and daily weights.  Place on fluid restriction of 1.5 L. Continue to stress to patient importance of self efficacy and  on diet for CHF. Last BNP reviewed- and noted below   Recent Labs   Lab 04/10/23  1031   BNP 2,187*   .      Demand ischemia  Pt with chronically elevated trop, initial level of 0.042 upon admission. Trop now downtrending. Likely demand ischemia in setting of elevated BNP and known hx of HpEF    -no acute interventions at this time, will cont to monitor      Type 2 diabetes mellitus with hyperglycemia, with long-term current use of insulin  See DKA    Abdominal pain  Pt presented with severe abdominal pain x3 days, she states it has progressively worsened. Pt found to be in DKA, could possibly be contributing to pain. CTAP negative for bowel obstruction, significant for bladder wall thickening, pt denies any symptoms of agitated bladder, pt is anuric 2/2 ESRD. Of note pt admitted 7 times in 2023 for similar episodes of abdominal pain, per past medical records gastroparesis is suspected however not confirmed. Etiology of abdominal pain possibly DKA vs function abdominal pain vs gastroparesis     -tyl 650mg, 1mg IV dilaudid prn for pain   -zofran prn for nausea   -CTAP largely unchanged from previous, will not pursue further imaging at this time    -consider gastric emptying study once patient more stable   -see DKA for remainder of plan      Uncontrolled hypertension  Pt with hx of HTN, home regimen including norvasc 10mg, coreg 25mg, clonidine patch, hydralazine 100mg, imdur 60mg. /111 at time of initial exam, considerable pain likely contributing to elevation     -coreg 25mg daily  -amlodipine 5mg daily  -hydralazine 100mg q12h  -imdur 60mg daily     ESRD (end stage renal disease) on dialysis  Pt with ESRD on dialysis T/Th/S. Pt reports having not missed a dialysis session in the past week. Pt does not make urine     -nephrology consulted for dialysis, will appreciate recs  -low threshold to alter nephrology for urgent HD given pt will receive IVF per DKA protocol; monitor for symptoms of volume overload       Seizure disorder  Cont home keppra      Gastroparesis - suspected, unconfirmed  See abdominal pain      VTE Risk Mitigation (From admission, onward)         Ordered     apixaban tablet 5 mg  2 times daily         04/10/23 1518                       On 04/10/2023, patient should be placed in hospital observation services under my care in collaboration with Dr. Sharif Barone.    Violette Winslow MD  Department of Hospital Medicine  [facility] - Emergency Dept

## 2023-04-10 NOTE — ASSESSMENT & PLAN NOTE
Pt presented with severe abdominal pain x3 days, she states it has progressively worsened. Pt found to be in DKA, could possibly be contributing to pain. CTAP negative for bowel obstruction, significant for bladder wall thickening, pt denies any symptoms of agitated bladder, pt is anuric 2/2 ESRD. Of note pt admitted 7 times in 2023 for similar episodes of abdominal pain, per past medical records gastroparesis is suspected however not confirmed. Etiology of abdominal pain possibly DKA vs function abdominal pain vs gastroparesis     -tyl 650mg, 1mg IV dilaudid prn for pain   -zofran prn for nausea   -CTAP largely unchanged from previous, will not pursue further imaging at this time   -consider gastric emptying study once patient more stable   -see DKA for remainder of plan

## 2023-04-10 NOTE — SUBJECTIVE & OBJECTIVE
Past Medical History:   Diagnosis Date    CKD (chronic kidney disease), stage IV 2022    Diabetes mellitus due to underlying condition with unspecified complications 2022    Gastroparesis 2022    Heart failure with preserved ejection fraction 2022    EF 55% on 3/22    History of gastroesophageal reflux (GERD)     History of supraventricular tachycardia     Hyperkalemia 2022    Hypertensive emergency 2022    Sickle cell trait 2022    Type 2 diabetes mellitus        Past Surgical History:   Procedure Laterality Date     SECTION      x 3    COLONOSCOPY      COLONOSCOPY N/A 2022    Procedure: COLONOSCOPY;  Surgeon: Jagdeep Cedeno MD;  Location: [facility];  Service: Endoscopy;  Laterality: N/A;    ESOPHAGOGASTRODUODENOSCOPY N/A 10/18/2019    Procedure: ESOPHAGOGASTRODUODENOSCOPY (EGD);  Surgeon: Gianluca Mendez MD;  Location: [facility];  Service: Endoscopy;  Laterality: N/A;    ESOPHAGOGASTRODUODENOSCOPY N/A 2022    Procedure: EGD (ESOPHAGOGASTRODUODENOSCOPY);  Surgeon: Micky Paredes III, MD;  Location: [facility];  Service: Endoscopy;  Laterality: N/A;    ESOPHAGOGASTRODUODENOSCOPY N/A 2022    Procedure: EGD (ESOPHAGOGASTRODUODENOSCOPY);  Surgeon: Marcelo Zhong MD;  Location: [facility];  Service: Endoscopy;  Laterality: N/A;    LAPAROSCOPIC CHOLECYSTECTOMY N/A 2022    Procedure: CHOLECYSTECTOMY, LAPAROSCOPIC;  Surgeon: Grey Perez MD;  Location: [facility];  Service: General;  Laterality: N/A;    PLACEMENT OF DUAL-LUMEN VASCULAR CATHETER Left 2022    Procedure: INSERTION-CATHETER-JOSEPH;  Surgeon: Dionte Gan MD;  Location: [facility];  Service: General;  Laterality: Left;    PLACEMENT OF DUAL-LUMEN VASCULAR CATHETER Right 2022    Procedure: INSERTION-CATHETER-Hemosplit;  Surgeon: Dionte Gan MD;  Location: [facility];  Service: General;  Laterality: Right;       Review of patient's allergies indicates:   Allergen Reactions     Penicillins Hives     Current Facility-Administered Medications   Medication Frequency    dextrose 10% bolus 125 mL 125 mL PRN    dextrose 10% bolus 250 mL 250 mL PRN    dextrose 40 % gel 15,000 mg PRN    dextrose 40 % gel 30,000 mg PRN    glucagon (human recombinant) injection 1 mg PRN    insulin regular in 0.9 % NaCl 100 unit/100 mL (1 unit/mL) infusion Continuous     Current Outpatient Medications   Medication    albuterol (PROVENTIL/VENTOLIN HFA) 90 mcg/actuation inhaler    amLODIPine (NORVASC) 10 MG tablet    apixaban (ELIQUIS) 5 mg Tab    BINAXNOW COVID-19 AG SELF TEST Kit    carvediloL (COREG) 25 MG tablet    cloNIDine 0.2 mg/24 hr td ptwk (CATAPRES) 0.2 mg/24 hr    FLUoxetine 20 MG capsule    gabapentin (NEURONTIN) 100 MG capsule    hydrALAZINE (APRESOLINE) 100 MG tablet    HYDROcodone-acetaminophen (NORCO)  mg per tablet    HYDROcodone-acetaminophen (NORCO) 5-325 mg per tablet    insulin detemir U-100 (LEVEMIR) 100 unit/mL injection    isosorbide mononitrate (IMDUR) 60 MG 24 hr tablet    levETIRAcetam (KEPPRA) 500 MG Tab    mirtazapine (REMERON SOL-TAB) 15 MG disintegrating tablet    ondansetron (ZOFRAN) 4 MG tablet    ondansetron (ZOFRAN-ODT) 4 MG TbDL    ondansetron (ZOFRAN-ODT) 4 MG TbDL    pantoprazole (PROTONIX) 40 MG tablet     Family History       Problem Relation (Age of Onset)    Diabetes Mother, Father          Tobacco Use    Smoking status: Never    Smokeless tobacco: Never   Substance and Sexual Activity    Alcohol use: Not Currently    Drug use: No    Sexual activity: Not Currently     Partners: Male     Birth control/protection: I.U.D.     Review of Systems   Constitutional: Negative.    HENT: Negative.     Eyes: Negative.    Respiratory: Negative.     Cardiovascular:  Positive for chest pain.   Gastrointestinal:  Positive for abdominal pain.   Genitourinary:  Positive for decreased urine volume (anuric at baseline).   Musculoskeletal:  Positive for back pain.   Skin: Negative.     Neurological: Negative.    Psychiatric/Behavioral: Negative.     Objective:     Vital Signs (Most Recent):  Temp: 98.1 °F (36.7 °C) (04/10/23 1034)  Pulse: 90 (04/10/23 1318)  Resp: 20 (04/10/23 1058)  BP: (!) 181/110 (04/10/23 1302)  SpO2: 100 % (04/10/23 1318)   Vital Signs (24h Range):  Temp:  [98.1 °F (36.7 °C)-98.2 °F (36.8 °C)] 98.1 °F (36.7 °C)  Pulse:  [87-97] 90  Resp:  [20-36] 20  SpO2:  [97 %-100 %] 100 %  BP: (181-228)/() 181/110     Weight: 54 kg (119 lb) (04/10/23 0954)  Body mass index is 21.77 kg/m².  Body surface area is 1.54 meters squared.    No intake/output data recorded.    Physical Exam  Constitutional:       Appearance: Normal appearance.   HENT:      Head: Normocephalic and atraumatic.      Nose: Nose normal.      Mouth/Throat:      Mouth: Mucous membranes are moist.      Pharynx: Oropharynx is clear.   Eyes:      Conjunctiva/sclera: Conjunctivae normal.   Cardiovascular:      Rate and Rhythm: Normal rate and regular rhythm.      Comments: R IJ TDC   Pulmonary:      Effort: Pulmonary effort is normal.      Breath sounds: Normal breath sounds.   Abdominal:      General: Abdomen is flat.   Musculoskeletal:         General: Normal range of motion.      Cervical back: Normal range of motion and neck supple.   Skin:     General: Skin is warm and dry.   Neurological:      General: No focal deficit present.      Mental Status: She is alert and oriented to person, place, and time.   Psychiatric:         Mood and Affect: Mood normal.         Behavior: Behavior normal.       Significant Labs:  CBC:   Recent Labs   Lab 04/10/23  1031   WBC 4.57   RBC 2.81*   HGB 8.4*   HCT 26.0*   *   MCV 93   MCH 29.9   MCHC 32.3     CMP:   Recent Labs   Lab 04/10/23  1031   *   CALCIUM 9.0   ALBUMIN 3.5   PROT 7.6      K 4.4   CO2 21*   CL 98   BUN 27*   CREATININE 6.1*   ALKPHOS 431*   ALT 47*   AST 34   BILITOT 1.7*     All labs within the past 24 hours have been reviewed.

## 2023-04-10 NOTE — ASSESSMENT & PLAN NOTE
Pt with known history of anemia in setting of ESRD. Hgb of 8.4 at time of exam     -trend daily CBC   -transfuse if Hbg <7

## 2023-04-10 NOTE — CONSULTS
[facility] - Emergency Dept  Endocrinology  Diabetes Consult Note    Consult Requested by: Sharif Barone MD   Reason for admit: Severe hyperglycemia    HISTORY OF PRESENT ILLNESS:  33 year old  female with type 2 diabetes mellitus, ESRD on HD (TTS), CHF, gastroparesis, sickle cell trait, seizure disorder, and hypertension.     - Patient sent to ED from endocrine clinic for severe hyperglycemia and concern for DKA    - Endocrinology consulted for management of severe hyperglycemia and type 2 diabetes mellitus    Regarding Type 2 diabetes mellitus:    Lab Results   Component Value Date    CO2 21 (L) 04/10/2023    CO2 25 2023    CO2 25 2023    CO2 26 2023    ANIONGAP 17 (H) 04/10/2023    ANIONGAP 15 2023    ANIONGAP 17 (H) 2023    ANIONGAP 6 (L) 2023    K 4.4 04/10/2023    K 3.1 (L) 2023    K 3.8 2023    EGFRNORACEVR 8.7 (A) 04/10/2023    EGFRNORACEVR 19.5 (A) 2023    BHYDRXBUT 0.9 (H) 04/10/2023    BHYDRXBUT 0.1 2023    HGBA1C 5.8 2023    HGBA1C 7.5 (H) 2022    HGBA1C 6.2 2022      Lab Results   Component Value Date    LIPASE 21 04/10/2023     Lab Results   Component Value Date     04/10/2023     2023     2023     (HH) 04/10/2023     (H) 2023     (H) 2023      - Corrected sodium calculator = 144 mEq/L    - Initially diagnosed with diabetes mellitus:  with gestational diabetes  - Home diabetic medications include: Patient unsure as her mother handles her insulin injections but she thinks she is on Lantus 18 units daily and NovoLog 8 units TIDWM  - Other medications tried: NA  - Weight based dosin kg x 0.3 (ESRD) = 16 TDD x 0.5 = 8 basal / 8 prandial  - 1700/TDD = 106 (estimated insulin sensitivity factor)  - 450/TDD = 28 (estimated starting carb ratio for prandial dosing)  - Family History:  Mother, grandmother, and cousins with type 2 diabetes  mellitus    Interval HPI:   Overnight events: NA  Eating:   NPO  Nausea: Yes  Hypoglycemia and intervention: N/A  Fever: No  TPN and/or TF: No  If yes, type of TF/TPN and rate: NA    PMH, PSH, FH, SH updated and reviewed     ROS:  Review of Systems   Constitutional:  Positive for appetite change and fatigue.   Gastrointestinal:  Positive for abdominal pain, nausea and vomiting.   Endocrine: Positive for polydipsia.     Current Medications and/or Treatments Impacting Glycemic Control  Immunotherapy:    Immunosuppressants       None          Steroids:   Hormones (From admission, onward)      None          Pressors:    Autonomic Drugs (From admission, onward)      None          Hyperglycemia/Diabetes Medications:   Antihyperglycemics (From admission, onward)      Start     Stop Route Frequency Ordered    04/10/23 1315  insulin regular in 0.9 % NaCl 100 unit/100 mL (1 unit/mL) infusion  (INSULIN INFUSIONS)        Question:  Enter initial dose from Infusion Protocol Chart (Units/hr):  Answer:  5    -- IV Continuous 04/10/23 1313             PHYSICAL EXAMINATION:  Vitals:    04/10/23 1502   BP: (!) 203/116   Pulse: 87   Resp:    Temp:      Body mass index is 21.77 kg/m².    Physical Exam  Constitutional:       General: She is in acute distress.   HENT:      Mouth/Throat:      Mouth: Mucous membranes are dry.   Eyes:      Comments: Blind   Cardiovascular:      Rate and Rhythm: Tachycardia present.   Pulmonary:      Comments: Difficult to assess, in acute distress  Abdominal:      General: Abdomen is flat.      Tenderness: There is abdominal tenderness.   Neurological:      Mental Status: She is alert. Mental status is at baseline.   Psychiatric:         Mood and Affect: Mood normal.       Labs Reviewed and Include   Recent Labs   Lab 04/10/23  1031   *   CALCIUM 9.0   ALBUMIN 3.5   PROT 7.6      K 4.4   CO2 21*   CL 98   BUN 27*   CREATININE 6.1*   ALKPHOS 431*   ALT 47*   AST 34   BILITOT 1.7*     Lab Results    Component Value Date    WBC 4.57 04/10/2023    HGB 8.4 (L) 04/10/2023    HCT 26.0 (L) 04/10/2023    MCV 93 04/10/2023     (L) 04/10/2023     No results for input(s): TSH, FREET4 in the last 168 hours.  Lab Results   Component Value Date    HGBA1C 5.8 03/03/2023     Nutritional status:   Body mass index is 21.77 kg/m².  Lab Results   Component Value Date    ALBUMIN 3.5 04/10/2023    ALBUMIN 3.3 (L) 04/08/2023    ALBUMIN 3.4 (L) 04/05/2023     No results found for: PREALBUMIN    Estimated Creatinine Clearance: 10.3 mL/min (A) (based on SCr of 6.1 mg/dL (H)).    Accu-Checks  Recent Labs     04/10/23  1441 04/10/23  1555   POCTGLUCOSE 345* 165*        ASSESSMENT and PLAN    Renal/  ESRD (end stage renal disease) on dialysis  - High-risk for hypoglycemia as mentioned above, caution with adjustments to insulin  - Management of ESRD per Nephrology with HD    Endocrine  Type 2 diabetes mellitus with hyperglycemia, with long-term current use of insulin  - Mild DKA versus stress related hyperglycemia with unclear trigger; patient states adherence to insulin therapy with the exception of this morning when she missed her morning Humalog  - High-risk of hypoglycemia given ESRD; has reportedly had recurring episodes of hypoglycemia home    - Continue intensive insulin drip for now and likely until tomorrow morning to better understand insulin requirement  - Q1H accuchecks while on intensive insulin drip  - Start clear liquid sugar-free diet if patient can tolerate    - Aggressive IV fluids, recommend checking BMP Q6 hr until further normalization  - Aggressive correction of electrolytes during this time, especially potassium, magnesium, and phosphorus  - Will consider converting to subcutaneous or transition insulin drip only if all below are met:  - Once bicarbonate greater than 18  - Anion gap is less than 12 on two subsequent BMP  - Glucose less is than 200 on two sequential reads while on steady insulin rate  (usually 1-3 units per hour for several hours)  - Tolerating diet without nausea or vomiting    GI  Gastroparesis - suspected, unconfirmed  - Likely contributor to patient's nausea rather than DKA, after chemistries improve and diabetic diet restarted, consider metoclopramide for symptomatic treatment    Marcelo Pena DO  [facility], 6th Floor  [address] [place], [place], [address]    Office: [phone]  Fax: [phone]    Disclaimer: This note has been generated using voice-recognition software. There may be typographical errors that have been missed during proof-reading.    The above history labs imaging impression and plan were discussed with attending physician who is in agreement and also took part in this patient's care.  I personally reviewed all of the patients available medications, labs, imaging, vitals, allergies, medical history.

## 2023-04-10 NOTE — ASSESSMENT & PLAN NOTE
- Likely contributor to patient's nausea rather than DKA, after chemistries improve and diabetic diet restarted, consider metoclopramide for symptomatic treatment

## 2023-04-10 NOTE — SUBJECTIVE & OBJECTIVE
Past Medical History:   Diagnosis Date    CKD (chronic kidney disease), stage IV 2022    Diabetes mellitus due to underlying condition with unspecified complications 2022    Gastroparesis 2022    Heart failure with preserved ejection fraction 2022    EF 55% on 3/22    History of gastroesophageal reflux (GERD)     History of supraventricular tachycardia     Hyperkalemia 2022    Hypertensive emergency 2022    Sickle cell trait 2022    Type 2 diabetes mellitus        Past Surgical History:   Procedure Laterality Date     SECTION      x 3    COLONOSCOPY      COLONOSCOPY N/A 2022    Procedure: COLONOSCOPY;  Surgeon: Jagdeep Cedeno MD;  Location: [facility];  Service: Endoscopy;  Laterality: N/A;    ESOPHAGOGASTRODUODENOSCOPY N/A 10/18/2019    Procedure: ESOPHAGOGASTRODUODENOSCOPY (EGD);  Surgeon: Gianluca Mendez MD;  Location: [facility];  Service: Endoscopy;  Laterality: N/A;    ESOPHAGOGASTRODUODENOSCOPY N/A 2022    Procedure: EGD (ESOPHAGOGASTRODUODENOSCOPY);  Surgeon: Micky Paredes III, MD;  Location: [facility];  Service: Endoscopy;  Laterality: N/A;    ESOPHAGOGASTRODUODENOSCOPY N/A 2022    Procedure: EGD (ESOPHAGOGASTRODUODENOSCOPY);  Surgeon: Marcelo Zhong MD;  Location: [facility];  Service: Endoscopy;  Laterality: N/A;    LAPAROSCOPIC CHOLECYSTECTOMY N/A 2022    Procedure: CHOLECYSTECTOMY, LAPAROSCOPIC;  Surgeon: Grey Perez MD;  Location: [facility];  Service: General;  Laterality: N/A;    PLACEMENT OF DUAL-LUMEN VASCULAR CATHETER Left 2022    Procedure: INSERTION-CATHETER-JOSEPH;  Surgeon: Dionte Gan MD;  Location: [facility] OR;  Service: General;  Laterality: Left;    PLACEMENT OF DUAL-LUMEN VASCULAR CATHETER Right 2022    Procedure: INSERTION-CATHETER-Hemosplit;  Surgeon: Dionte Gan MD;  Location: [facility] OR;  Service: General;  Laterality: Right;       Review of patient's allergies indicates:   Allergen Reactions     Penicillins Hives       No current facility-administered medications on file prior to encounter.     Current Outpatient Medications on File Prior to Encounter   Medication Sig    albuterol (PROVENTIL/VENTOLIN HFA) 90 mcg/actuation inhaler Inhale 2 puffs into the lungs every 6 (six) hours as needed for Wheezing. Rescue    amLODIPine (NORVASC) 10 MG tablet Take 1 tablet every day by oral route for 30 days.    apixaban (ELIQUIS) 5 mg Tab Take 1 tablet (5 mg total) by mouth 2 (two) times daily.    carvediloL (COREG) 25 MG tablet Take 1 tablet twice a day by oral route for 30 days.    cloNIDine 0.2 mg/24 hr td ptwk (CATAPRES) 0.2 mg/24 hr Apply 1 patch to skin once every week.    FLUoxetine 20 MG capsule Take 1 capsule every day by oral route for 30 days.    gabapentin (NEURONTIN) 100 MG capsule Take 1 capsule by mouth 3 times daily    hydrALAZINE (APRESOLINE) 100 MG tablet Take 1 tablet twice a day by oral route for 30 days.    HYDROcodone-acetaminophen (NORCO)  mg per tablet Take 1 tablet by mouth every 6 (six) hours as needed for Pain.    HYDROcodone-acetaminophen (NORCO) 5-325 mg per tablet Take 1 tablet by mouth every 6 (six) hours as needed for Pain.    insulin detemir U-100 (LEVEMIR) 100 unit/mL injection Inject 5 Units into the skin every evening.    isosorbide mononitrate (IMDUR) 60 MG 24 hr tablet Take 1 tablet every day by oral route for 30 days.    levETIRAcetam (KEPPRA) 500 MG Tab Take 1 tablet twice a day by oral route for 30 days.    mirtazapine (REMERON SOL-TAB) 15 MG disintegrating tablet Take 1 tablet (15 mg total) by mouth nightly.    ondansetron (ZOFRAN) 4 MG tablet Take 1 tablet (4 mg total) by mouth every 6 (six) hours as needed.    ondansetron (ZOFRAN-ODT) 4 MG TbDL Place1 tablet on the tongue every 8 hours as needed    ondansetron (ZOFRAN-ODT) 4 MG TbDL Take 1 tablet (4 mg total) by mouth every 6 (six) hours as needed.    pantoprazole (PROTONIX) 40 MG tablet Take 1 tablet every day by oral  route for 30 days.    [DISCONTINUED] atenoloL (TENORMIN) 50 MG tablet Take 1 tablet (50 mg total) by mouth every other day.    [DISCONTINUED] BINAXNOW COVID-19 AG SELF TEST Kit Take 1 Dose by mouth once.    [DISCONTINUED] omeprazole (PRILOSEC) 20 MG capsule Take 2 capsules (40 mg total) by mouth once daily. for 10 days     Family History       Problem Relation (Age of Onset)    Diabetes Mother, Father          Tobacco Use    Smoking status: Never    Smokeless tobacco: Never   Substance and Sexual Activity    Alcohol use: Not Currently    Drug use: No    Sexual activity: Not Currently     Partners: Male     Birth control/protection: I.U.D.     Review of Systems   Constitutional:  Positive for appetite change. Negative for activity change, chills and fever.   HENT:  Negative for congestion, rhinorrhea, sinus pressure, sinus pain, sneezing and sore throat.    Eyes:  Positive for visual disturbance.   Respiratory:  Positive for chest tightness. Negative for apnea, cough, choking and shortness of breath.    Cardiovascular:  Positive for chest pain. Negative for palpitations and leg swelling.   Gastrointestinal:  Positive for abdominal pain, nausea and vomiting. Negative for abdominal distention, constipation and diarrhea.   Endocrine: Negative for polyphagia and polyuria.   Genitourinary:  Negative for dysuria, flank pain and urgency.   Musculoskeletal:  Positive for back pain. Negative for arthralgias, joint swelling, myalgias, neck pain and neck stiffness.   Neurological:  Negative for dizziness, weakness, light-headedness and headaches.   Psychiatric/Behavioral:  Negative for agitation. The patient is nervous/anxious.    Objective:     Vital Signs (Most Recent):  Temp: 98.1 °F (36.7 °C) (04/10/23 1034)  Pulse: 87 (04/10/23 1502)  Resp: 20 (04/10/23 1058)  BP: (!) 203/116 (04/10/23 1502)  SpO2: 100 % (04/10/23 1502)   Vital Signs (24h Range):  Temp:  [98.1 °F (36.7 °C)-98.2 °F (36.8 °C)] 98.1 °F (36.7 °C)  Pulse:   [87-97] 87  Resp:  [20-36] 20  SpO2:  [97 %-100 %] 100 %  BP: (181-228)/() 203/116     Weight: 54 kg (119 lb)  Body mass index is 21.77 kg/m².    Physical Exam  Constitutional:       General: She is in acute distress.      Appearance: She is ill-appearing. She is not diaphoretic.   HENT:      Head: Normocephalic and atraumatic.      Mouth/Throat:      Mouth: Mucous membranes are moist.      Pharynx: Oropharynx is clear.   Eyes:      Conjunctiva/sclera: Conjunctivae normal.   Cardiovascular:      Rate and Rhythm: Tachycardia present.      Pulses: Normal pulses.      Heart sounds: Normal heart sounds.   Pulmonary:      Effort: Pulmonary effort is normal. No respiratory distress.      Breath sounds: Normal breath sounds. No stridor. No wheezing or rhonchi.   Abdominal:      General: Abdomen is flat. There is no distension.      Palpations: Abdomen is soft.      Tenderness: There is abdominal tenderness. There is no guarding or rebound.      Comments: Hypoactive bowel sounds, diffuse tenderness to palpation   Musculoskeletal:         General: Normal range of motion.      Cervical back: Normal range of motion and neck supple.      Right lower leg: No edema.      Left lower leg: No edema.   Skin:     General: Skin is warm and dry.   Neurological:      General: No focal deficit present.      Mental Status: She is oriented to person, place, and time.   Psychiatric:      Comments: Tearful, in significant distress at time of exam. Very cooperative despite pain           Significant Labs: All pertinent labs within the past 24 hours have been reviewed.    Significant Imaging: I have reviewed all pertinent imaging results/findings within the past 24 hours.

## 2023-04-10 NOTE — ASSESSMENT & PLAN NOTE
- High-risk for hypoglycemia as mentioned above, caution with adjustments to insulin  - Management of ESRD per Nephrology with HD

## 2023-04-10 NOTE — SUBJECTIVE & OBJECTIVE
Interval HPI:   Overnight events: NA  Eating:   NPO  Nausea: Yes  Hypoglycemia and intervention: N/A  Fever: No  TPN and/or TF: No  If yes, type of TF/TPN and rate: NA    PMH, PSH, FH, SH updated and reviewed     ROS:  Review of Systems   Constitutional:  Positive for appetite change and fatigue.   Gastrointestinal:  Positive for abdominal pain, nausea and vomiting.   Endocrine: Positive for polydipsia.     Current Medications and/or Treatments Impacting Glycemic Control  Immunotherapy:    Immunosuppressants       None          Steroids:   Hormones (From admission, onward)      None          Pressors:    Autonomic Drugs (From admission, onward)      None          Hyperglycemia/Diabetes Medications:   Antihyperglycemics (From admission, onward)      Start     Stop Route Frequency Ordered    04/10/23 1315  insulin regular in 0.9 % NaCl 100 unit/100 mL (1 unit/mL) infusion  (INSULIN INFUSIONS)        Question:  Enter initial dose from Infusion Protocol Chart (Units/hr):  Answer:  5    -- IV Continuous 04/10/23 1313             PHYSICAL EXAMINATION:  Vitals:    04/10/23 1502   BP: (!) 203/116   Pulse: 87   Resp:    Temp:      Body mass index is 21.77 kg/m².    Physical Exam  Constitutional:       General: She is in acute distress.   HENT:      Mouth/Throat:      Mouth: Mucous membranes are dry.   Eyes:      Comments: Blind   Cardiovascular:      Rate and Rhythm: Tachycardia present.   Pulmonary:      Comments: Difficult to assess, in acute distress  Abdominal:      General: Abdomen is flat.      Tenderness: There is abdominal tenderness.   Neurological:      Mental Status: She is alert. Mental status is at baseline.   Psychiatric:         Mood and Affect: Mood normal.

## 2023-04-10 NOTE — ASSESSMENT & PLAN NOTE
Patient's FSGs are uncontrolled due to hyperglycemia on current medication regimen. B hydroxybutarate elevated to 0.9. Anion gap of 17. Initial glucose of 509   Last A1c reviewed-   Lab Results   Component Value Date    HGBA1C 5.8 03/03/2023     Most recent fingerstick glucose reviewed-   Recent Labs   Lab 04/10/23  1441 04/10/23  1555   POCTGLUCOSE 345* 165*     Current correctional scale  insulin gtt  Maintain anti-hyperglycemic dose as follows-   Antihyperglycemics (From admission, onward)    Start     Stop Route Frequency Ordered    04/10/23 1315  insulin regular in 0.9 % NaCl 100 unit/100 mL (1 unit/mL) infusion  (INSULIN INFUSIONS)        Question:  Enter initial dose from Infusion Protocol Chart (Units/hr):  Answer:  5    -- IV Continuous 04/10/23 1313        Hold Oral hypoglycemics while patient is in the hospital.  -Endocrinology consulted, will appreciate recs  -caution with IVF administration given ESRD and elevated BNP  -if gap closed with follow up BMP, will continue insulin gtt x2 hours and transition to subq

## 2023-04-10 NOTE — HPI
Tabby Howard is a 32 yo female  with a medical history of gastroparesis, sickle cell trait, DM type 2, HTN, CHF, and ESRD on HD (T/Th/Sat) who last dialyzed on 4/8/23. Patient sent to ED from clinic for high blood sugar and HTN, found to have glucose >600 and SBP in 200's . Patient endorses abdominal pain, N/V, and back pian. She is anuric at baseline. Nephrology was consulted for management of ESRD/HD.

## 2023-04-10 NOTE — ASSESSMENT & PLAN NOTE
Pt with chronically elevated trop, initial level of 0.042 upon admission. Trop now downtrending. Likely demand ischemia in setting of elevated BNP and known hx of HpEF    -no acute interventions at this time, will cont to monitor

## 2023-04-11 ENCOUNTER — DOCUMENTATION ONLY (OUTPATIENT)
Dept: TRANSPLANT | Facility: CLINIC | Age: 33
End: 2023-04-11
Payer: MEDICAID

## 2023-04-11 LAB
ANION GAP SERPL CALC-SCNC: 10 MMOL/L (ref 8–16)
ANION GAP SERPL CALC-SCNC: 12 MMOL/L (ref 8–16)
BASOPHILS # BLD AUTO: 0.02 K/UL (ref 0–0.2)
BASOPHILS NFR BLD: 0.4 % (ref 0–1.9)
BUN SERPL-MCNC: 12 MG/DL (ref 6–20)
BUN SERPL-MCNC: 32 MG/DL (ref 6–20)
CALCIUM SERPL-MCNC: 8.5 MG/DL (ref 8.7–10.5)
CALCIUM SERPL-MCNC: 8.7 MG/DL (ref 8.7–10.5)
CHLORIDE SERPL-SCNC: 101 MMOL/L (ref 95–110)
CHLORIDE SERPL-SCNC: 97 MMOL/L (ref 95–110)
CO2 SERPL-SCNC: 23 MMOL/L (ref 23–29)
CO2 SERPL-SCNC: 23 MMOL/L (ref 23–29)
CREAT SERPL-MCNC: 2.9 MG/DL (ref 0.5–1.4)
CREAT SERPL-MCNC: 6.3 MG/DL (ref 0.5–1.4)
DIFFERENTIAL METHOD: ABNORMAL
EOSINOPHIL # BLD AUTO: 0.1 K/UL (ref 0–0.5)
EOSINOPHIL NFR BLD: 2.9 % (ref 0–8)
ERYTHROCYTE [DISTWIDTH] IN BLOOD BY AUTOMATED COUNT: 19 % (ref 11.5–14.5)
EST. GFR  (NO RACE VARIABLE): 21.1 ML/MIN/1.73 M^2
EST. GFR  (NO RACE VARIABLE): 8.3 ML/MIN/1.73 M^2
GLUCOSE SERPL-MCNC: 104 MG/DL (ref 70–110)
GLUCOSE SERPL-MCNC: 136 MG/DL (ref 70–110)
HCG INTACT+B SERPL-ACNC: <2.4 MIU/ML
HCT VFR BLD AUTO: 26.2 % (ref 37–48.5)
HGB BLD-MCNC: 8.4 G/DL (ref 12–16)
IMM GRANULOCYTES # BLD AUTO: 0.02 K/UL (ref 0–0.04)
IMM GRANULOCYTES NFR BLD AUTO: 0.4 % (ref 0–0.5)
LYMPHOCYTES # BLD AUTO: 1 K/UL (ref 1–4.8)
LYMPHOCYTES NFR BLD: 20.6 % (ref 18–48)
MAGNESIUM SERPL-MCNC: 1.8 MG/DL (ref 1.6–2.6)
MCH RBC QN AUTO: 30.2 PG (ref 27–31)
MCHC RBC AUTO-ENTMCNC: 32.1 G/DL (ref 32–36)
MCV RBC AUTO: 94 FL (ref 82–98)
MONOCYTES # BLD AUTO: 0.5 K/UL (ref 0.3–1)
MONOCYTES NFR BLD: 9.7 % (ref 4–15)
NEUTROPHILS # BLD AUTO: 3.1 K/UL (ref 1.8–7.7)
NEUTROPHILS NFR BLD: 66 % (ref 38–73)
NRBC BLD-RTO: 0 /100 WBC
PHOSPHATE SERPL-MCNC: 5.2 MG/DL (ref 2.7–4.5)
PLATELET # BLD AUTO: 96 K/UL (ref 150–450)
PMV BLD AUTO: 9.9 FL (ref 9.2–12.9)
POCT GLUCOSE: 115 MG/DL (ref 70–110)
POCT GLUCOSE: 129 MG/DL (ref 70–110)
POCT GLUCOSE: 157 MG/DL (ref 70–110)
POCT GLUCOSE: 160 MG/DL (ref 70–110)
POCT GLUCOSE: 176 MG/DL (ref 70–110)
POCT GLUCOSE: 210 MG/DL (ref 70–110)
POCT GLUCOSE: 263 MG/DL (ref 70–110)
POCT GLUCOSE: 296 MG/DL (ref 70–110)
POCT GLUCOSE: 55 MG/DL (ref 70–110)
POCT GLUCOSE: 58 MG/DL (ref 70–110)
POCT GLUCOSE: 64 MG/DL (ref 70–110)
POCT GLUCOSE: 82 MG/DL (ref 70–110)
POTASSIUM SERPL-SCNC: 4 MMOL/L (ref 3.5–5.1)
POTASSIUM SERPL-SCNC: 4.3 MMOL/L (ref 3.5–5.1)
RBC # BLD AUTO: 2.78 M/UL (ref 4–5.4)
SARS-COV-2 RDRP RESP QL NAA+PROBE: NEGATIVE
SODIUM SERPL-SCNC: 132 MMOL/L (ref 136–145)
SODIUM SERPL-SCNC: 134 MMOL/L (ref 136–145)
WBC # BLD AUTO: 4.76 K/UL (ref 3.9–12.7)

## 2023-04-11 PROCEDURE — 25000003 PHARM REV CODE 250

## 2023-04-11 PROCEDURE — 96372 THER/PROPH/DIAG INJ SC/IM: CPT | Performed by: STUDENT IN AN ORGANIZED HEALTH CARE EDUCATION/TRAINING PROGRAM

## 2023-04-11 PROCEDURE — 25000003 PHARM REV CODE 250: Performed by: EMERGENCY MEDICINE

## 2023-04-11 PROCEDURE — 25000003 PHARM REV CODE 250: Performed by: STUDENT IN AN ORGANIZED HEALTH CARE EDUCATION/TRAINING PROGRAM

## 2023-04-11 PROCEDURE — S5010 5% DEXTROSE AND 0.45% SALINE: HCPCS | Performed by: STUDENT IN AN ORGANIZED HEALTH CARE EDUCATION/TRAINING PROGRAM

## 2023-04-11 PROCEDURE — 90935 PR HEMODIALYSIS, ONE EVALUATION: ICD-10-PCS | Mod: ,,, | Performed by: NURSE PRACTITIONER

## 2023-04-11 PROCEDURE — 83735 ASSAY OF MAGNESIUM: CPT | Performed by: STUDENT IN AN ORGANIZED HEALTH CARE EDUCATION/TRAINING PROGRAM

## 2023-04-11 PROCEDURE — 20600001 HC STEP DOWN PRIVATE ROOM

## 2023-04-11 PROCEDURE — 99233 SBSQ HOSP IP/OBS HIGH 50: CPT | Mod: ,,, | Performed by: STUDENT IN AN ORGANIZED HEALTH CARE EDUCATION/TRAINING PROGRAM

## 2023-04-11 PROCEDURE — 63600175 PHARM REV CODE 636 W HCPCS: Performed by: STUDENT IN AN ORGANIZED HEALTH CARE EDUCATION/TRAINING PROGRAM

## 2023-04-11 PROCEDURE — 63600175 PHARM REV CODE 636 W HCPCS

## 2023-04-11 PROCEDURE — 99233 PR SUBSEQUENT HOSPITAL CARE,LEVL III: ICD-10-PCS | Mod: ,,, | Performed by: STUDENT IN AN ORGANIZED HEALTH CARE EDUCATION/TRAINING PROGRAM

## 2023-04-11 PROCEDURE — 85025 COMPLETE CBC W/AUTO DIFF WBC: CPT | Performed by: STUDENT IN AN ORGANIZED HEALTH CARE EDUCATION/TRAINING PROGRAM

## 2023-04-11 PROCEDURE — G0257 UNSCHED DIALYSIS ESRD PT HOS: HCPCS

## 2023-04-11 PROCEDURE — 63600175 PHARM REV CODE 636 W HCPCS: Performed by: NURSE PRACTITIONER

## 2023-04-11 PROCEDURE — 99232 SBSQ HOSP IP/OBS MODERATE 35: CPT | Mod: ,,, | Performed by: INTERNAL MEDICINE

## 2023-04-11 PROCEDURE — 36415 COLL VENOUS BLD VENIPUNCTURE: CPT | Performed by: STUDENT IN AN ORGANIZED HEALTH CARE EDUCATION/TRAINING PROGRAM

## 2023-04-11 PROCEDURE — 84100 ASSAY OF PHOSPHORUS: CPT | Performed by: STUDENT IN AN ORGANIZED HEALTH CARE EDUCATION/TRAINING PROGRAM

## 2023-04-11 PROCEDURE — 80048 BASIC METABOLIC PNL TOTAL CA: CPT | Mod: 91 | Performed by: STUDENT IN AN ORGANIZED HEALTH CARE EDUCATION/TRAINING PROGRAM

## 2023-04-11 PROCEDURE — 25000003 PHARM REV CODE 250: Performed by: NURSE PRACTITIONER

## 2023-04-11 PROCEDURE — 90935 HEMODIALYSIS ONE EVALUATION: CPT | Mod: ,,, | Performed by: NURSE PRACTITIONER

## 2023-04-11 PROCEDURE — 84702 CHORIONIC GONADOTROPIN TEST: CPT | Performed by: STUDENT IN AN ORGANIZED HEALTH CARE EDUCATION/TRAINING PROGRAM

## 2023-04-11 PROCEDURE — 99232 PR SUBSEQUENT HOSPITAL CARE,LEVL II: ICD-10-PCS | Mod: ,,, | Performed by: INTERNAL MEDICINE

## 2023-04-11 RX ORDER — INSULIN ASPART 100 [IU]/ML
2 INJECTION, SOLUTION INTRAVENOUS; SUBCUTANEOUS
Status: DISCONTINUED | OUTPATIENT
Start: 2023-04-11 | End: 2023-04-12

## 2023-04-11 RX ORDER — AMITRIPTYLINE HYDROCHLORIDE 25 MG/1
50 TABLET, FILM COATED ORAL NIGHTLY
Status: DISCONTINUED | OUTPATIENT
Start: 2023-04-11 | End: 2023-04-14 | Stop reason: HOSPADM

## 2023-04-11 RX ORDER — DEXTROSE 40 %
15 GEL (GRAM) ORAL
Status: DISCONTINUED | OUTPATIENT
Start: 2023-04-11 | End: 2023-04-11

## 2023-04-11 RX ORDER — DEXTROSE 40 %
30 GEL (GRAM) ORAL
Status: DISCONTINUED | OUTPATIENT
Start: 2023-04-11 | End: 2023-04-11

## 2023-04-11 RX ORDER — INSULIN ASPART 100 [IU]/ML
3 INJECTION, SOLUTION INTRAVENOUS; SUBCUTANEOUS
Status: DISCONTINUED | OUTPATIENT
Start: 2023-04-11 | End: 2023-04-11

## 2023-04-11 RX ORDER — DEXTROSE 40 %
15 GEL (GRAM) ORAL
Status: DISCONTINUED | OUTPATIENT
Start: 2023-04-11 | End: 2023-04-14 | Stop reason: HOSPADM

## 2023-04-11 RX ORDER — METOCLOPRAMIDE HYDROCHLORIDE 5 MG/ML
10 INJECTION INTRAMUSCULAR; INTRAVENOUS EVERY 8 HOURS PRN
Status: DISCONTINUED | OUTPATIENT
Start: 2023-04-11 | End: 2023-04-14 | Stop reason: HOSPADM

## 2023-04-11 RX ORDER — DEXTROSE 40 %
30 GEL (GRAM) ORAL
Status: DISCONTINUED | OUTPATIENT
Start: 2023-04-11 | End: 2023-04-14 | Stop reason: HOSPADM

## 2023-04-11 RX ORDER — INSULIN ASPART 100 [IU]/ML
0-5 INJECTION, SOLUTION INTRAVENOUS; SUBCUTANEOUS
Status: DISCONTINUED | OUTPATIENT
Start: 2023-04-11 | End: 2023-04-12

## 2023-04-11 RX ORDER — HYDROMORPHONE HYDROCHLORIDE 1 MG/ML
1 INJECTION, SOLUTION INTRAMUSCULAR; INTRAVENOUS; SUBCUTANEOUS EVERY 8 HOURS PRN
Status: COMPLETED | OUTPATIENT
Start: 2023-04-11 | End: 2023-04-12

## 2023-04-11 RX ORDER — HEPARIN SODIUM 1000 [USP'U]/ML
1000 INJECTION, SOLUTION INTRAVENOUS; SUBCUTANEOUS
Status: DISCONTINUED | OUTPATIENT
Start: 2023-04-11 | End: 2023-04-14 | Stop reason: HOSPADM

## 2023-04-11 RX ORDER — HYDROMORPHONE HYDROCHLORIDE 1 MG/ML
1 INJECTION, SOLUTION INTRAMUSCULAR; INTRAVENOUS; SUBCUTANEOUS EVERY 8 HOURS
Status: DISCONTINUED | OUTPATIENT
Start: 2023-04-11 | End: 2023-04-11

## 2023-04-11 RX ORDER — CARVEDILOL 25 MG/1
25 TABLET ORAL 2 TIMES DAILY WITH MEALS
Status: DISCONTINUED | OUTPATIENT
Start: 2023-04-11 | End: 2023-04-14 | Stop reason: HOSPADM

## 2023-04-11 RX ORDER — METOCLOPRAMIDE HYDROCHLORIDE 5 MG/ML
10 INJECTION INTRAMUSCULAR; INTRAVENOUS EVERY 6 HOURS PRN
Status: DISCONTINUED | OUTPATIENT
Start: 2023-04-11 | End: 2023-04-11

## 2023-04-11 RX ORDER — GLUCAGON 1 MG
1 KIT INJECTION
Status: DISCONTINUED | OUTPATIENT
Start: 2023-04-11 | End: 2023-04-14 | Stop reason: HOSPADM

## 2023-04-11 RX ORDER — GLUCAGON 1 MG
1 KIT INJECTION
Status: DISCONTINUED | OUTPATIENT
Start: 2023-04-11 | End: 2023-04-11

## 2023-04-11 RX ORDER — SODIUM CHLORIDE 9 MG/ML
INJECTION, SOLUTION INTRAVENOUS ONCE
Status: COMPLETED | OUTPATIENT
Start: 2023-04-11 | End: 2023-04-11

## 2023-04-11 RX ORDER — ACETAMINOPHEN 500 MG
1000 TABLET ORAL EVERY 6 HOURS PRN
Status: DISCONTINUED | OUTPATIENT
Start: 2023-04-11 | End: 2023-04-13

## 2023-04-11 RX ADMIN — DEXTROSE MONOHYDRATE 250 ML: 100 INJECTION, SOLUTION INTRAVENOUS at 05:04

## 2023-04-11 RX ADMIN — HYDRALAZINE HYDROCHLORIDE 100 MG: 50 TABLET ORAL at 09:04

## 2023-04-11 RX ADMIN — INSULIN ASPART 2 UNITS: 100 INJECTION, SOLUTION INTRAVENOUS; SUBCUTANEOUS at 02:04

## 2023-04-11 RX ADMIN — INSULIN DETEMIR 10 UNITS: 100 INJECTION, SOLUTION SUBCUTANEOUS at 08:04

## 2023-04-11 RX ADMIN — MIRTAZAPINE 15 MG: 15 TABLET, ORALLY DISINTEGRATING ORAL at 09:04

## 2023-04-11 RX ADMIN — APIXABAN 5 MG: 5 TABLET, FILM COATED ORAL at 08:04

## 2023-04-11 RX ADMIN — GABAPENTIN 300 MG: 300 CAPSULE ORAL at 08:04

## 2023-04-11 RX ADMIN — FLUOXETINE 20 MG: 20 CAPSULE ORAL at 08:04

## 2023-04-11 RX ADMIN — CARVEDILOL 25 MG: 25 TABLET, FILM COATED ORAL at 05:04

## 2023-04-11 RX ADMIN — LEVETIRACETAM 500 MG: 500 TABLET, FILM COATED ORAL at 08:04

## 2023-04-11 RX ADMIN — HEPARIN SODIUM 1000 UNITS: 1000 INJECTION, SOLUTION INTRAVENOUS; SUBCUTANEOUS at 12:04

## 2023-04-11 RX ADMIN — INSULIN ASPART 1 UNITS: 100 INJECTION, SOLUTION INTRAVENOUS; SUBCUTANEOUS at 09:04

## 2023-04-11 RX ADMIN — APIXABAN 5 MG: 5 TABLET, FILM COATED ORAL at 09:04

## 2023-04-11 RX ADMIN — METOCLOPRAMIDE 10 MG: 5 INJECTION, SOLUTION INTRAMUSCULAR; INTRAVENOUS at 09:04

## 2023-04-11 RX ADMIN — SODIUM CHLORIDE: 9 INJECTION, SOLUTION INTRAVENOUS at 09:04

## 2023-04-11 RX ADMIN — PANTOPRAZOLE SODIUM 40 MG: 40 TABLET, DELAYED RELEASE ORAL at 08:04

## 2023-04-11 RX ADMIN — DEXTROSE 15000 MG: 15 GEL ORAL at 05:04

## 2023-04-11 RX ADMIN — INSULIN HUMAN 0.1 UNITS/KG/HR: 1 INJECTION, SOLUTION INTRAVENOUS at 01:04

## 2023-04-11 RX ADMIN — AMITRIPTYLINE HYDROCHLORIDE 50 MG: 25 TABLET, FILM COATED ORAL at 09:04

## 2023-04-11 RX ADMIN — INSULIN ASPART 3 UNITS: 100 INJECTION, SOLUTION INTRAVENOUS; SUBCUTANEOUS at 10:04

## 2023-04-11 RX ADMIN — HYDROMORPHONE HYDROCHLORIDE 1 MG: 1 INJECTION, SOLUTION INTRAMUSCULAR; INTRAVENOUS; SUBCUTANEOUS at 01:04

## 2023-04-11 RX ADMIN — HYDROMORPHONE HYDROCHLORIDE 1 MG: 1 INJECTION, SOLUTION INTRAMUSCULAR; INTRAVENOUS; SUBCUTANEOUS at 09:04

## 2023-04-11 RX ADMIN — LEVETIRACETAM 500 MG: 500 TABLET, FILM COATED ORAL at 09:04

## 2023-04-11 RX ADMIN — DEXTROSE AND SODIUM CHLORIDE: 5; 450 INJECTION, SOLUTION INTRAVENOUS at 04:04

## 2023-04-11 NOTE — ED NOTES
Telemetry Verification   Patient placed on Telemetry Box  Verified with War Room  Box # [id number]   Monitor Tech    Rate 79   Rhythm Sinus

## 2023-04-11 NOTE — ASSESSMENT & PLAN NOTE
Pt with ESRD on dialysis T/Th/S. Pt reports having not missed a dialysis session in the past week. Pt does not make urine     -nephrology consulted for dialysis, will appreciate recs  -HD completed on 4/11

## 2023-04-11 NOTE — NURSING
RN reported recheck BS was 115 to Dr. Holcomb-endocrine. MD verbalized understanding and will review orders. RN will check orders and continue to monitor. Pt's lunch melva was delivered with burger and fries, pt states she plans on eating meal.

## 2023-04-11 NOTE — PLAN OF CARE
[facility] - Transplant Stepdown  Initial Discharge Assessment       Primary Care Provider: AIDEN CONNER NP    Admission Diagnosis: Vomiting [R11.10]  Hyperglycemia [R73.9]  Chest pain [R07.9]  Abdominal pain, unspecified abdominal location [R10.9]    Admission Date: 4/10/2023  Expected Discharge Date:     Discharge Barriers Identified: Underinsured    Payor: MEDICAID / Plan: HEALTH[place] BLUE [place] / Product Type: Managed Medicaid /     Extended Emergency Contact Information  Primary Emergency Contact: Tanya Howard  Address: [address] [place] [address], [place] [address] [facility]  Home Phone: [phone]  Mobile Phone: [phone]  Relation: Step parent  Preferred language: English   needed? No  Secondary Emergency Contact: Garcia Howard  Address: [address] [place] [address], [place] [address]  Mobile Phone: [phone]  Relation: Father  Preferred language: English   needed? No    Discharge Plan A:  (TBD)  Discharge Plan B:  (TBD)      [facility] [place]  [address] [place] [address] [address]  [facility] [address]  Phone: [phone] Fax: [phone]      Initial Assessment (most recent)       Adult Discharge Assessment - 04/11/23 0920          Discharge Assessment    Assessment Type Discharge Planning Assessment     Confirmed/corrected address, phone number and insurance Yes     Confirmed Demographics Correct on Facesheet     Source of Information patient     When was your last doctors appointment? 03/15/23     Communicated TATIANA with patient/caregiver Date not available/Unable to determine     People in Home parent(s)     Do you expect to return to your current living situation? Yes     Do you have help at home or someone to help you manage your care at home? Yes     Prior to hospitilization cognitive status: No Deficits     Current cognitive status: No Deficits     Home Layout Able to live on 1st floor     Equipment Currently Used at Home none     Readmission within 30 days?  Yes     Patient currently being followed by outpatient case management? No     Do you currently have service(s) that help you manage your care at home? No     Do you take prescription medications? Yes     Do you have prescription coverage? Yes     Do you have any problems affording any of your prescribed medications? No     Is the patient taking medications as prescribed? yes     Who is going to help you get home at discharge? FAMILY     How do you get to doctors appointments? family or friend will provide     Are you on dialysis? Yes   DAVITA /SLIDELL T/TH/S    Discharge Plan A --   TBD    Discharge Plan B --   TBD    DME Needed Upon Discharge  none     Discharge Plan discussed with: Patient     Discharge Barriers Identified Underinsured        Physical Activity    On average, how many days per week do you engage in moderate to strenuous exercise (like a brisk walk)? Patient refused     On average, how many minutes do you engage in exercise at this level? Patient refused        Financial Resource Strain    How hard is it for you to pay for the very basics like food, housing, medical care, and heating? Patient refused        Housing Stability    In the last 12 months, was there a time when you were not able to pay the mortgage or rent on time? Patient refused     In the last 12 months, was there a time when you did not have a steady place to sleep or slept in a shelter (including now)? Patient refused        Transportation Needs    In the past 12 months, has lack of transportation kept you from medical appointments or from getting medications? Patient refused     In the past 12 months, has lack of transportation kept you from meetings, work, or from getting things needed for daily living? Patient refused        Food Insecurity    Within the past 12 months, you worried that your food would run out before you got the money to buy more. Patient refused        Stress    Do you feel stress - tense, restless, nervous, or  anxious, or unable to sleep at night because your mind is troubled all the time - these days? Patient refused        Social Connections    In a typical week, how many times do you talk on the phone with family, friends, or neighbors? Patient refused     How often do you get together with friends or relatives? Patient refused     How often do you attend Sikh or Hinduism services? Patient refused     Do you belong to any clubs or organizations such as Sikh groups, unions, fraternal or athletic groups, or school groups? Patient refused     How often do you attend meetings of the clubs or organizations you belong to? Patient refused     Are you , , , , never , or living with a partner? Patient refused        Alcohol Use    Q1: How often do you have a drink containing alcohol? Patient refused     Q2: How many drinks containing alcohol do you have on a typical day when you are drinking? Patient refused     Q3: How often do you have six or more drinks on one occasion? Patient refused                 Patient lives in a first floor apartment with her parents she is on dialysis ([facility] Connolly) she does not take coumadin she has a ride home

## 2023-04-11 NOTE — HOSPITAL COURSE
Patient admitted to hospital medicine for DKA. She was started on insulin infusion and noted to have labile blood glucose. She was subsequently transitioned to subcutaneous insulin with stabilization of blood glucose. During hospitalization, patient with severe abdominal pain. CT of abdomen without acute abnormalities to explain abdominal pain. Given nature and history, suspect likely gastroparesis. Initiated multimodal pain regimen but patient noncompliant and requesting IV dilaudid. Discussed in depth with patient that opioids would likely worsen pain. GI consulted to evaluate for concern of gastroparesis and refractory pain. Pt stable for discharge at this time, glucose trends stabilized. Outpatient referrals to endocrinology, GI, pain management placed.

## 2023-04-11 NOTE — PROGRESS NOTES
[facility] Medicine  Progress Note    Patient Name: Tabby Howard  MRN: [id number]  Patient Class: OP- Observation   Admission Date: 4/10/2023  Length of Stay: 0 days  Attending Physician: Sharif Barone MD  Primary Care Provider: AIDEN CONNER NP        Subjective:     Principal Problem:DKA (diabetic ketoacidosis)        HPI:  Pt is a 33 y.o. female PMH of ESRD on HD T/Th/S, T2DM on insulin, HFpEF, HTN, unknown seizure disorder, DVT of LUE on apixiban, who presented to [facility] ED after prompting from oupt endocrinology appointment when pt was discovered to have glucose >500 accompanied by N/V and abdominal pain. Pt reports abdominal pain began 3 days ago and has progressively worsened. She also reports chest pain and low central back pain that also began 3 days ago. With regards to chest pain patient explains it feels like pressure and has been consistent since onset, also endorses orthopnea. Pt is unable to described abdominal pain due to being in significant pain at time of exam. Of note pt was recently admitted for abdominal pain, was discharged from this admission on 4/8, she states that this episode of pain is different from her prior admission. Pt denies missing any dialysis sessions, reports no changes in her diet prior to onset of abdominal pain or N/V. States that her glucose has been in the 200s for the past 3 days concurrently with the abdominal pain, and that she has not missed any doses of home insulin.     While in ED CTAP was obtained, negative for bowel obstruction, significant for bladder wall thickening concerning for cystitis. BNP elevated to 2,187, CXR with marked cardiomegaly, but there has been no significant detrimental interval change, no pleural effusion. Initial trop elevated to 0.042, now down trending. Glucose elevated to 509, B hydroxybutarate elevated, anion gap elevated. Pt admitted to hospital medicine with DKA, endocrine consult by ED, recommended  insulin gtt and K containing IVF.       Overview/Hospital Course:  Pt glucose levels sensitive to insulin gtt, with periodic episodes of hypoglycemia with subsequent holding of drip. Pt met criteria to transition to sub q insulin, pt transitioned on 4/11 AM, diet started. HD performed on 4/11. Abdominal pain control evolving, reglan attempted with no relief.       Interval History: Pt endorses diffuse aching abdominal, lower back and chest pain, likely chronic in nature. Will not escalate opioids at this time, will trial amytriptyline and increased dose of tylenol.     Review of Systems   Constitutional:  Positive for appetite change. Negative for activity change, chills and fever.   HENT:  Negative for congestion, rhinorrhea, sinus pressure, sinus pain, sneezing and sore throat.    Eyes:  Positive for visual disturbance.   Respiratory:  Positive for chest tightness. Negative for apnea, cough, choking and shortness of breath.    Cardiovascular:  Positive for chest pain. Negative for palpitations and leg swelling.   Gastrointestinal:  Positive for abdominal pain, nausea and vomiting. Negative for abdominal distention, constipation and diarrhea.   Endocrine: Negative for polyphagia and polyuria.   Genitourinary:  Negative for dysuria, flank pain and urgency.   Musculoskeletal:  Positive for back pain. Negative for arthralgias, joint swelling, myalgias, neck pain and neck stiffness.   Neurological:  Negative for dizziness, weakness, light-headedness and headaches.   Psychiatric/Behavioral:  Negative for agitation. The patient is nervous/anxious.    Objective:     Vital Signs (Most Recent):  Temp: 98.2 °F (36.8 °C) (04/11/23 0900)  Pulse: 77 (04/11/23 1200)  Resp: 18 (04/11/23 0900)  BP: 133/84 (04/11/23 1200)  SpO2: 99 % (04/11/23 0814) Vital Signs (24h Range):  Temp:  [97.8 °F (36.6 °C)-99.1 °F (37.3 °C)] 98.2 °F (36.8 °C)  Pulse:  [70-90] 77  Resp:  [18-20] 18  SpO2:  [93 %-100 %] 99 %  BP: (110-203)/() 133/84      Weight: 54 kg (119 lb)  Body mass index is 21.77 kg/m².    Intake/Output Summary (Last 24 hours) at 4/11/2023 1211  Last data filed at 4/11/2023 0721  Gross per 24 hour   Intake 322.32 ml   Output 50 ml   Net 272.32 ml      Physical Exam  Constitutional:       General: She is in acute distress.      Appearance: She is ill-appearing. She is not diaphoretic.   HENT:      Head: Normocephalic and atraumatic.      Mouth/Throat:      Mouth: Mucous membranes are moist.      Pharynx: Oropharynx is clear.   Eyes:      Conjunctiva/sclera: Conjunctivae normal.   Cardiovascular:      Rate and Rhythm: Tachycardia present.      Pulses: Normal pulses.      Heart sounds: Normal heart sounds.   Pulmonary:      Effort: Pulmonary effort is normal. No respiratory distress.      Breath sounds: Normal breath sounds. No stridor. No wheezing or rhonchi.   Abdominal:      General: Abdomen is flat. There is no distension.      Palpations: Abdomen is soft.      Tenderness: There is abdominal tenderness. There is no guarding or rebound.      Comments: Hypoactive bowel sounds, diffuse tenderness to palpation   Musculoskeletal:         General: Normal range of motion.      Cervical back: Normal range of motion and neck supple.      Right lower leg: No edema.      Left lower leg: No edema.   Skin:     General: Skin is warm and dry.   Neurological:      General: No focal deficit present.      Mental Status: She is oriented to person, place, and time.       Significant Labs: All pertinent labs within the past 24 hours have been reviewed.    Significant Imaging: I have reviewed all pertinent imaging results/findings within the past 24 hours.      Assessment/Plan:      * DKA (diabetic ketoacidosis)  Patient's FSGs are uncontrolled due to hyperglycemia on current medication regimen. B hydroxybutarate elevated to 0.9. Anion gap of 17. Initial glucose of 509   Last A1c reviewed-   Lab Results   Component Value Date    HGBA1C 5.8 03/03/2023     Most  recent fingerstick glucose reviewed-   Recent Labs   Lab 04/11/23  0528 04/11/23  0613 04/11/23  0813 04/11/23  0958   POCTGLUCOSE 55* 160* 176* 129*     Current correctional scale  Low  Maintain anti-hyperglycemic dose as follows-   Antihyperglycemics (From admission, onward)    Start     Stop Route Frequency Ordered    04/11/23 0833  insulin aspart U-100 pen 0-5 Units         -- SubQ Before meals & nightly PRN 04/11/23 0736    04/11/23 0830  insulin detemir U-100 (Levemir) pen 10 Units         -- SubQ Daily 04/11/23 0821    04/11/23 0745  insulin aspart U-100 pen 3 Units         -- SubQ 3 times daily with meals 04/11/23 0736    04/10/23 1315  insulin regular in 0.9 % NaCl 100 unit/100 mL (1 unit/mL) infusion  (INSULIN INFUSIONS)        Question:  Enter initial dose from Infusion Protocol Chart (Units/hr):  Answer:  5    -- IV Continuous 04/10/23 1313        Hold Oral hypoglycemics while patient is in the hospital.  -Endocrinology consulted, will appreciate recs  -caution with IVF administration given ESRD and elevated BNP      Mood disorder  Cont home mirtazipine, fluoxetine       Chronic deep vein thrombosis (DVT) of left upper extremity  Cont home apixiban       Anemia in ESRD (end-stage renal disease)  Pt with known history of anemia in setting of ESRD. Hgb of 8.4 at time of exam     -trend daily CBC   -transfuse if Hbg <7      Congestive heart failure with left ventricular diastolic dysfunction  Patient is identified as having Diastolic (HFpEF) heart failure that is Acute on chronic. CHF is currently uncontrolled due to >3 pillow orthopnea. Latest ECHO performed and demonstrates- Results for orders placed during the hospital encounter of 01/12/23    Echo    Interpretation Summary  · The left ventricle is normal in size with moderate concentric hypertrophy and normal systolic function.  · The estimated ejection fraction is 55%.  · Grade III left ventricular diastolic dysfunction.  · Normal right ventricular  size with normal right ventricular systolic function.  · Moderate left atrial enlargement.  · Moderate to severe tricuspid regurgitation.  · Mild to moderate pulmonic regurgitation.  · Mild mitral regurgitation.  · Normal central venous pressure (3 mmHg).  · The estimated PA systolic pressure is 56 mmHg.  · There is pulmonary hypertension.  · Moderate to large circumferential pericardial effusion. No evidence of tamponade.  . Continue Beta Blocker and monitor clinical status closely. Monitor on telemetry. Patient is off CHF pathway.  Monitor strict Is&Os and daily weights.  Place on fluid restriction of 1.5 L. Continue to stress to patient importance of self efficacy and  on diet for CHF. Last BNP reviewed- and noted below   Recent Labs   Lab 04/10/23  1031   BNP 2,187*   .      Demand ischemia  Pt with chronically elevated trop, initial level of 0.042 upon admission. Trop now downtrending. Likely demand ischemia in setting of elevated BNP and known hx of HpEF    -no acute interventions at this time, will cont to monitor      Type 2 diabetes mellitus with hyperglycemia, with long-term current use of insulin  See DKA    Abdominal pain  Pt presented with severe abdominal pain x3 days, she states it has progressively worsened. Pt found to be in DKA, could possibly be contributing to pain. CTAP negative for bowel obstruction, significant for bladder wall thickening, pt denies any symptoms of agitated bladder, pt is anuric 2/2 ESRD. Of note pt admitted 7 times in 2023 for similar episodes of abdominal pain, per past medical records gastroparesis is suspected however not confirmed. Etiology of abdominal pain possibly DKA vs function abdominal pain vs gastroparesis     -inc tyl 1g, 1mg IV dilaudid prn for pain   -concern for chronic pain resembling fibromyalgia, will trial amitriptyline   -zofran prn for nausea   -CTAP largely unchanged from previous, will not pursue further imaging at this time   -consider gastric  emptying study once patient more stable   -see DKA for remainder of plan      Uncontrolled hypertension  Pt with hx of HTN, home regimen including norvasc 10mg, coreg 25mg, clonidine patch, hydralazine 100mg, imdur 60mg. /111 at time of initial exam, considerable pain likely contributing to elevation     -coreg 25mg daily  -amlodipine 5mg daily  -hydralazine 100mg q12h  -imdur 60mg daily     ESRD (end stage renal disease) on dialysis  Pt with ESRD on dialysis T/Th/S. Pt reports having not missed a dialysis session in the past week. Pt does not make urine     -nephrology consulted for dialysis, will appreciate recs  -HD completed on 4/11      Seizure disorder  Cont home keppra      Gastroparesis - suspected, unconfirmed  See abdominal pain        VTE Risk Mitigation (From admission, onward)         Ordered     heparin (porcine) injection 1,000 Units  As needed (PRN)         04/11/23 1115     apixaban tablet 5 mg  2 times daily         04/10/23 1518                Discharge Planning   TATIANA:      Code Status: Full Code   Is the patient medically ready for discharge?:     Reason for patient still in hospital (select all that apply): Patient trending condition  Discharge Plan A:  (TBD)                  Violette Winslow MD  Department of Hospital Medicine   [facility] - [facility]

## 2023-04-11 NOTE — PROGRESS NOTES
Pt arrived to unit AAOx4 via w/c with O2@2L/NC and tele in place. HD initiated via right chest cvc, tolerated well, flows good. UF goal 3 L as tolerated

## 2023-04-11 NOTE — ASSESSMENT & PLAN NOTE
Patient's FSGs are uncontrolled due to hyperglycemia on current medication regimen. B hydroxybutarate elevated to 0.9. Anion gap of 17. Initial glucose of 509   Last A1c reviewed-   Lab Results   Component Value Date    HGBA1C 5.8 03/03/2023     Most recent fingerstick glucose reviewed-   Recent Labs   Lab 04/11/23  0528 04/11/23  0613 04/11/23  0813 04/11/23  0958   POCTGLUCOSE 55* 160* 176* 129*     Current correctional scale  Low  Maintain anti-hyperglycemic dose as follows-   Antihyperglycemics (From admission, onward)    Start     Stop Route Frequency Ordered    04/11/23 0833  insulin aspart U-100 pen 0-5 Units         -- SubQ Before meals & nightly PRN 04/11/23 0736    04/11/23 0830  insulin detemir U-100 (Levemir) pen 10 Units         -- SubQ Daily 04/11/23 0821    04/11/23 0745  insulin aspart U-100 pen 3 Units         -- SubQ 3 times daily with meals 04/11/23 0736    04/10/23 1315  insulin regular in 0.9 % NaCl 100 unit/100 mL (1 unit/mL) infusion  (INSULIN INFUSIONS)        Question:  Enter initial dose from Infusion Protocol Chart (Units/hr):  Answer:  5    -- IV Continuous 04/10/23 1313        Hold Oral hypoglycemics while patient is in the hospital.  -Endocrinology consulted, will appreciate recs  -caution with IVF administration given ESRD and elevated BNP

## 2023-04-11 NOTE — ASSESSMENT & PLAN NOTE
Pt presented with severe abdominal pain x3 days, she states it has progressively worsened. Pt found to be in DKA, could possibly be contributing to pain. CTAP negative for bowel obstruction, significant for bladder wall thickening, pt denies any symptoms of agitated bladder, pt is anuric 2/2 ESRD. Of note pt admitted 7 times in 2023 for similar episodes of abdominal pain, per past medical records gastroparesis is suspected however not confirmed. Etiology of abdominal pain possibly DKA vs function abdominal pain vs gastroparesis     -inc tyl 1g, 1mg IV dilaudid prn for pain   -concern for chronic pain resembling fibromyalgia, will trial amitriptyline   -zofran prn for nausea   -CTAP largely unchanged from previous, will not pursue further imaging at this time   -consider gastric emptying study once patient more stable   -see DKA for remainder of plan

## 2023-04-11 NOTE — NURSING
This RN reported to IM 1 the pt's BP was 123/80 and the pt reports being in 10/10 abd pain. Dr. Horner ordered to hold all BP medications this AM and instructed RN to hold Dilaudid and ordered Reglan for pain. RN will follow orders and continue to monitor.

## 2023-04-11 NOTE — CONSULTS
RD consulted for carbohydrate consistent diet education. Pt presented for DKA with glucose > 500 accompanied by N/V and abdominal pain at outpt endocrinology appointment. Pt reports she know diabetic diet well and was compliant to diet and insulin at home. Per chart review, pt was recently educated on DM diet on 2/8/2023. Pt refused reeducation at this time. Reports good appetite, denies any significant wt changes recently. RD following.

## 2023-04-11 NOTE — NURSING
This RN reported to Dr. Holcomb pt's blood sugar was 58. Pt received 3 units of insulin at 1030 and was 129 prior to eating breakfast. Pt reports eating all of her breakfast (grits, eggs, and sausage). This RN provided orange juice and sheryl crackers. MD verbalized understanding and does not want to order anything until recheck. MD approved orange juice and sheryl crackers.  RN will continue to monitor.

## 2023-04-11 NOTE — PLAN OF CARE
Notified MD patient BS is 64. Turned off insulin gtt. Administered PO dextrose and will recheck BS in 15 mins. Patient is AAO X4. VSS.

## 2023-04-11 NOTE — PLAN OF CARE
Patient AAO X 4, VSS. Denies pain, N/V. Telemonitor in place NSR.  Skin intact. Gave patient dextrose bolus and now BS is 160. Insulin gtt is stopped per MD. Possible HD today. D5 Na .45 going at 125cc/hr.  Up ambulatory, bed locked at lowest setting, yellow socks on, call bell in reach. Remains free from falls.

## 2023-04-11 NOTE — SUBJECTIVE & OBJECTIVE
Interval History: Pt endorses diffuse aching abdominal, lower back and chest pain, likely chronic in nature. Will not escalate opioids at this time, will trial amytriptyline and increased dose of tylenol.     Review of Systems   Constitutional:  Positive for appetite change. Negative for activity change, chills and fever.   HENT:  Negative for congestion, rhinorrhea, sinus pressure, sinus pain, sneezing and sore throat.    Eyes:  Positive for visual disturbance.   Respiratory:  Positive for chest tightness. Negative for apnea, cough, choking and shortness of breath.    Cardiovascular:  Positive for chest pain. Negative for palpitations and leg swelling.   Gastrointestinal:  Positive for abdominal pain, nausea and vomiting. Negative for abdominal distention, constipation and diarrhea.   Endocrine: Negative for polyphagia and polyuria.   Genitourinary:  Negative for dysuria, flank pain and urgency.   Musculoskeletal:  Positive for back pain. Negative for arthralgias, joint swelling, myalgias, neck pain and neck stiffness.   Neurological:  Negative for dizziness, weakness, light-headedness and headaches.   Psychiatric/Behavioral:  Negative for agitation. The patient is nervous/anxious.    Objective:     Vital Signs (Most Recent):  Temp: 98.2 °F (36.8 °C) (04/11/23 0900)  Pulse: 77 (04/11/23 1200)  Resp: 18 (04/11/23 0900)  BP: 133/84 (04/11/23 1200)  SpO2: 99 % (04/11/23 0814) Vital Signs (24h Range):  Temp:  [97.8 °F (36.6 °C)-99.1 °F (37.3 °C)] 98.2 °F (36.8 °C)  Pulse:  [70-90] 77  Resp:  [18-20] 18  SpO2:  [93 %-100 %] 99 %  BP: (110-203)/() 133/84     Weight: 54 kg (119 lb)  Body mass index is 21.77 kg/m².    Intake/Output Summary (Last 24 hours) at 4/11/2023 1211  Last data filed at 4/11/2023 0721  Gross per 24 hour   Intake 322.32 ml   Output 50 ml   Net 272.32 ml      Physical Exam  Constitutional:       General: She is in acute distress.      Appearance: She is ill-appearing. She is not diaphoretic.    HENT:      Head: Normocephalic and atraumatic.      Mouth/Throat:      Mouth: Mucous membranes are moist.      Pharynx: Oropharynx is clear.   Eyes:      Conjunctiva/sclera: Conjunctivae normal.   Cardiovascular:      Rate and Rhythm: Tachycardia present.      Pulses: Normal pulses.      Heart sounds: Normal heart sounds.   Pulmonary:      Effort: Pulmonary effort is normal. No respiratory distress.      Breath sounds: Normal breath sounds. No stridor. No wheezing or rhonchi.   Abdominal:      General: Abdomen is flat. There is no distension.      Palpations: Abdomen is soft.      Tenderness: There is abdominal tenderness. There is no guarding or rebound.      Comments: Hypoactive bowel sounds, diffuse tenderness to palpation   Musculoskeletal:         General: Normal range of motion.      Cervical back: Normal range of motion and neck supple.      Right lower leg: No edema.      Left lower leg: No edema.   Skin:     General: Skin is warm and dry.   Neurological:      General: No focal deficit present.      Mental Status: She is oriented to person, place, and time.       Significant Labs: All pertinent labs within the past 24 hours have been reviewed.    Significant Imaging: I have reviewed all pertinent imaging results/findings within the past 24 hours.

## 2023-04-11 NOTE — PLAN OF CARE
Pt is AAOX4, independent, and VSS. HD performed- 3 L removed. Blood glucose monitoring performed and treated as ordered, Blood glucose of 58 reported to endo team. Telemetry DC. Reglan initiated for abd pain. Pt's bed in lowest position, call bell within reach, nonskid socks on, and RN encouraged pt to call for any assistance needed. RN rounded on pt throughout shift. Will continue to monitor.

## 2023-04-11 NOTE — PROGRESS NOTES
[facility] HEMODIALYSIS NOTE     Patient currently on hemodialysis for removal of uremic toxins and volume.     Patient seen and evaluated on hemodialysis, tolerating treatment, see HD flowsheet for vitals and assessments.    Labs have been reviewed and the dialysate bath has been adjusted.       Assessment/Plan:    -ESRD on HD   -Patient seen on HD, tolerating treatment well, w/o complaints   -UF goal of 3L as tolerated   -Renal diet, if not NPO   -Strict I/O's and daily weights  -Daily renal function panels  -Keep MAP >65 while on HD   -Maintain Hgb >7.0  -Ordered ferritin/iron studies   -Will continue to follow while inpatient       Henri Montoya, NP  Nephrology

## 2023-04-11 NOTE — ED NOTES
Spoke with Evens SPRINGER about patient glucose and insulin drip. MD made aware of drops in patient blood sugar. Patient sugar was rechecked and was after admin of oral and IV glucose. Would like to have drip paused until patient glucose reaches greater than 180.

## 2023-04-11 NOTE — PROGRESS NOTES
HD tx complete. 3 L removed over 3.5 hours, tolerated well. Catheter dressing change perform to catheter site using sterile technique. Once blood returned to pt, both lumens flushed with NS, hep locked, capped, and wrapped. Report given to primary nurse Luzmaria. Pt transferred from unit via w/c by transport back to room with O2@2L/NC and tele in place. VSS, in NAD

## 2023-04-12 LAB
ANION GAP SERPL CALC-SCNC: 13 MMOL/L (ref 8–16)
ANION GAP SERPL CALC-SCNC: 9 MMOL/L (ref 8–16)
ANION GAP SERPL CALC-SCNC: 9 MMOL/L (ref 8–16)
BASOPHILS # BLD AUTO: 0.02 K/UL (ref 0–0.2)
BASOPHILS NFR BLD: 0.5 % (ref 0–1.9)
BUN SERPL-MCNC: 26 MG/DL (ref 6–20)
BUN SERPL-MCNC: 27 MG/DL (ref 6–20)
BUN SERPL-MCNC: 28 MG/DL (ref 6–20)
C PEPTIDE SERPL-MCNC: 4.17 NG/ML (ref 0.78–5.19)
CALCIUM SERPL-MCNC: 8.3 MG/DL (ref 8.7–10.5)
CALCIUM SERPL-MCNC: 8.5 MG/DL (ref 8.7–10.5)
CALCIUM SERPL-MCNC: 8.5 MG/DL (ref 8.7–10.5)
CHLORIDE SERPL-SCNC: 102 MMOL/L (ref 95–110)
CHLORIDE SERPL-SCNC: 98 MMOL/L (ref 95–110)
CHLORIDE SERPL-SCNC: 99 MMOL/L (ref 95–110)
CO2 SERPL-SCNC: 21 MMOL/L (ref 23–29)
CO2 SERPL-SCNC: 21 MMOL/L (ref 23–29)
CO2 SERPL-SCNC: 22 MMOL/L (ref 23–29)
CREAT SERPL-MCNC: 4.9 MG/DL (ref 0.5–1.4)
CREAT SERPL-MCNC: 5.2 MG/DL (ref 0.5–1.4)
CREAT SERPL-MCNC: 5.2 MG/DL (ref 0.5–1.4)
DIFFERENTIAL METHOD: ABNORMAL
EOSINOPHIL # BLD AUTO: 0.2 K/UL (ref 0–0.5)
EOSINOPHIL NFR BLD: 3.9 % (ref 0–8)
ERYTHROCYTE [DISTWIDTH] IN BLOOD BY AUTOMATED COUNT: 18.6 % (ref 11.5–14.5)
EST. GFR  (NO RACE VARIABLE): 10.5 ML/MIN/1.73 M^2
EST. GFR  (NO RACE VARIABLE): 10.5 ML/MIN/1.73 M^2
EST. GFR  (NO RACE VARIABLE): 11.3 ML/MIN/1.73 M^2
FERRITIN SERPL-MCNC: 5959 NG/ML (ref 20–300)
GLUCOSE SERPL-MCNC: 183 MG/DL (ref 70–110)
GLUCOSE SERPL-MCNC: 626 MG/DL (ref 70–110)
GLUCOSE SERPL-MCNC: 656 MG/DL (ref 70–110)
HCT VFR BLD AUTO: 26.9 % (ref 37–48.5)
HGB BLD-MCNC: 8.4 G/DL (ref 12–16)
IMM GRANULOCYTES # BLD AUTO: 0.01 K/UL (ref 0–0.04)
IMM GRANULOCYTES NFR BLD AUTO: 0.2 % (ref 0–0.5)
IRON SERPL-MCNC: 61 UG/DL (ref 30–160)
LYMPHOCYTES # BLD AUTO: 0.5 K/UL (ref 1–4.8)
LYMPHOCYTES NFR BLD: 12.3 % (ref 18–48)
MAGNESIUM SERPL-MCNC: 2 MG/DL (ref 1.6–2.6)
MCH RBC QN AUTO: 29.8 PG (ref 27–31)
MCHC RBC AUTO-ENTMCNC: 31.2 G/DL (ref 32–36)
MCV RBC AUTO: 95 FL (ref 82–98)
MONOCYTES # BLD AUTO: 0.3 K/UL (ref 0.3–1)
MONOCYTES NFR BLD: 6 % (ref 4–15)
NEUTROPHILS # BLD AUTO: 3.3 K/UL (ref 1.8–7.7)
NEUTROPHILS NFR BLD: 77.1 % (ref 38–73)
NRBC BLD-RTO: 0 /100 WBC
PHOSPHATE SERPL-MCNC: 5 MG/DL (ref 2.7–4.5)
PLATELET # BLD AUTO: 101 K/UL (ref 150–450)
PMV BLD AUTO: 10.6 FL (ref 9.2–12.9)
POCT GLUCOSE: 164 MG/DL (ref 70–110)
POCT GLUCOSE: 294 MG/DL (ref 70–110)
POCT GLUCOSE: 320 MG/DL (ref 70–110)
POCT GLUCOSE: 416 MG/DL (ref 70–110)
POCT GLUCOSE: >500 MG/DL (ref 70–110)
POTASSIUM SERPL-SCNC: 4.8 MMOL/L (ref 3.5–5.1)
POTASSIUM SERPL-SCNC: 5.4 MMOL/L (ref 3.5–5.1)
POTASSIUM SERPL-SCNC: 5.4 MMOL/L (ref 3.5–5.1)
RBC # BLD AUTO: 2.82 M/UL (ref 4–5.4)
SATURATED IRON: 28 % (ref 20–50)
SODIUM SERPL-SCNC: 129 MMOL/L (ref 136–145)
SODIUM SERPL-SCNC: 129 MMOL/L (ref 136–145)
SODIUM SERPL-SCNC: 136 MMOL/L (ref 136–145)
TOTAL IRON BINDING CAPACITY: 216 UG/DL (ref 250–450)
TRANSFERRIN SERPL-MCNC: 146 MG/DL (ref 200–375)
WBC # BLD AUTO: 4.31 K/UL (ref 3.9–12.7)

## 2023-04-12 PROCEDURE — 84100 ASSAY OF PHOSPHORUS: CPT | Performed by: STUDENT IN AN ORGANIZED HEALTH CARE EDUCATION/TRAINING PROGRAM

## 2023-04-12 PROCEDURE — 36415 COLL VENOUS BLD VENIPUNCTURE: CPT

## 2023-04-12 PROCEDURE — 20600001 HC STEP DOWN PRIVATE ROOM

## 2023-04-12 PROCEDURE — 80048 BASIC METABOLIC PNL TOTAL CA: CPT | Mod: 91

## 2023-04-12 PROCEDURE — 25000003 PHARM REV CODE 250: Performed by: STUDENT IN AN ORGANIZED HEALTH CARE EDUCATION/TRAINING PROGRAM

## 2023-04-12 PROCEDURE — 25000003 PHARM REV CODE 250

## 2023-04-12 PROCEDURE — 85025 COMPLETE CBC W/AUTO DIFF WBC: CPT | Performed by: STUDENT IN AN ORGANIZED HEALTH CARE EDUCATION/TRAINING PROGRAM

## 2023-04-12 PROCEDURE — 99233 PR SUBSEQUENT HOSPITAL CARE,LEVL III: ICD-10-PCS | Mod: ,,, | Performed by: STUDENT IN AN ORGANIZED HEALTH CARE EDUCATION/TRAINING PROGRAM

## 2023-04-12 PROCEDURE — 80048 BASIC METABOLIC PNL TOTAL CA: CPT | Performed by: STUDENT IN AN ORGANIZED HEALTH CARE EDUCATION/TRAINING PROGRAM

## 2023-04-12 PROCEDURE — 63600175 PHARM REV CODE 636 W HCPCS: Performed by: STUDENT IN AN ORGANIZED HEALTH CARE EDUCATION/TRAINING PROGRAM

## 2023-04-12 PROCEDURE — 25000003 PHARM REV CODE 250: Performed by: NURSE PRACTITIONER

## 2023-04-12 PROCEDURE — 99233 SBSQ HOSP IP/OBS HIGH 50: CPT | Mod: ,,, | Performed by: STUDENT IN AN ORGANIZED HEALTH CARE EDUCATION/TRAINING PROGRAM

## 2023-04-12 PROCEDURE — 82728 ASSAY OF FERRITIN: CPT | Performed by: NURSE PRACTITIONER

## 2023-04-12 PROCEDURE — 84466 ASSAY OF TRANSFERRIN: CPT | Performed by: NURSE PRACTITIONER

## 2023-04-12 PROCEDURE — 99232 PR SUBSEQUENT HOSPITAL CARE,LEVL II: ICD-10-PCS | Mod: ,,, | Performed by: INTERNAL MEDICINE

## 2023-04-12 PROCEDURE — 84681 ASSAY OF C-PEPTIDE: CPT | Performed by: STUDENT IN AN ORGANIZED HEALTH CARE EDUCATION/TRAINING PROGRAM

## 2023-04-12 PROCEDURE — 86341 ISLET CELL ANTIBODY: CPT | Mod: 91 | Performed by: STUDENT IN AN ORGANIZED HEALTH CARE EDUCATION/TRAINING PROGRAM

## 2023-04-12 PROCEDURE — 99232 SBSQ HOSP IP/OBS MODERATE 35: CPT | Mod: ,,, | Performed by: INTERNAL MEDICINE

## 2023-04-12 PROCEDURE — 83735 ASSAY OF MAGNESIUM: CPT | Performed by: STUDENT IN AN ORGANIZED HEALTH CARE EDUCATION/TRAINING PROGRAM

## 2023-04-12 PROCEDURE — 86341 ISLET CELL ANTIBODY: CPT | Performed by: STUDENT IN AN ORGANIZED HEALTH CARE EDUCATION/TRAINING PROGRAM

## 2023-04-12 RX ORDER — DICYCLOMINE HYDROCHLORIDE 10 MG/1
10 CAPSULE ORAL 4 TIMES DAILY PRN
Status: DISCONTINUED | OUTPATIENT
Start: 2023-04-12 | End: 2023-04-14 | Stop reason: HOSPADM

## 2023-04-12 RX ORDER — SODIUM CHLORIDE 9 MG/ML
INJECTION, SOLUTION INTRAVENOUS ONCE
Status: DISCONTINUED | OUTPATIENT
Start: 2023-04-13 | End: 2023-04-14 | Stop reason: HOSPADM

## 2023-04-12 RX ORDER — CAPSAICIN 0.03 G/100G
CREAM TOPICAL 2 TIMES DAILY
Status: DISCONTINUED | OUTPATIENT
Start: 2023-04-12 | End: 2023-04-14 | Stop reason: HOSPADM

## 2023-04-12 RX ORDER — INSULIN ASPART 100 [IU]/ML
6 INJECTION, SOLUTION INTRAVENOUS; SUBCUTANEOUS ONCE
Status: COMPLETED | OUTPATIENT
Start: 2023-04-12 | End: 2023-04-12

## 2023-04-12 RX ORDER — METHOCARBAMOL 750 MG/1
750 TABLET, FILM COATED ORAL 4 TIMES DAILY PRN
Status: DISCONTINUED | OUTPATIENT
Start: 2023-04-12 | End: 2023-04-14 | Stop reason: HOSPADM

## 2023-04-12 RX ORDER — IBUPROFEN 200 MG
24 TABLET ORAL
Status: DISCONTINUED | OUTPATIENT
Start: 2023-04-12 | End: 2023-04-12

## 2023-04-12 RX ORDER — INSULIN ASPART 100 [IU]/ML
3 INJECTION, SOLUTION INTRAVENOUS; SUBCUTANEOUS
Status: DISCONTINUED | OUTPATIENT
Start: 2023-04-12 | End: 2023-04-12

## 2023-04-12 RX ORDER — INSULIN ASPART 100 [IU]/ML
4 INJECTION, SOLUTION INTRAVENOUS; SUBCUTANEOUS
Status: DISCONTINUED | OUTPATIENT
Start: 2023-04-12 | End: 2023-04-14 | Stop reason: HOSPADM

## 2023-04-12 RX ORDER — INSULIN ASPART 100 [IU]/ML
0-5 INJECTION, SOLUTION INTRAVENOUS; SUBCUTANEOUS
Status: DISCONTINUED | OUTPATIENT
Start: 2023-04-12 | End: 2023-04-14 | Stop reason: HOSPADM

## 2023-04-12 RX ORDER — IBUPROFEN 200 MG
16 TABLET ORAL
Status: DISCONTINUED | OUTPATIENT
Start: 2023-04-12 | End: 2023-04-12

## 2023-04-12 RX ORDER — LORAZEPAM 2 MG/ML
0.5 INJECTION INTRAMUSCULAR ONCE
Status: COMPLETED | OUTPATIENT
Start: 2023-04-13 | End: 2023-04-12

## 2023-04-12 RX ORDER — PROCHLORPERAZINE EDISYLATE 5 MG/ML
5 INJECTION INTRAMUSCULAR; INTRAVENOUS ONCE
Status: DISCONTINUED | OUTPATIENT
Start: 2023-04-12 | End: 2023-04-12

## 2023-04-12 RX ORDER — GLUCAGON 1 MG
1 KIT INJECTION
Status: DISCONTINUED | OUTPATIENT
Start: 2023-04-12 | End: 2023-04-12

## 2023-04-12 RX ORDER — LIDOCAINE 50 MG/G
1 PATCH TOPICAL
Status: DISCONTINUED | OUTPATIENT
Start: 2023-04-12 | End: 2023-04-14 | Stop reason: HOSPADM

## 2023-04-12 RX ADMIN — ACETAMINOPHEN 1000 MG: 500 TABLET ORAL at 07:04

## 2023-04-12 RX ADMIN — APIXABAN 5 MG: 5 TABLET, FILM COATED ORAL at 08:04

## 2023-04-12 RX ADMIN — HYDRALAZINE HYDROCHLORIDE 100 MG: 50 TABLET ORAL at 08:04

## 2023-04-12 RX ADMIN — INSULIN ASPART 5 UNITS: 100 INJECTION, SOLUTION INTRAVENOUS; SUBCUTANEOUS at 08:04

## 2023-04-12 RX ADMIN — INSULIN DETEMIR 6 UNITS: 100 INJECTION, SOLUTION SUBCUTANEOUS at 09:04

## 2023-04-12 RX ADMIN — INSULIN ASPART 6 UNITS: 100 INJECTION, SOLUTION INTRAVENOUS; SUBCUTANEOUS at 10:04

## 2023-04-12 RX ADMIN — INSULIN ASPART 3 UNITS: 100 INJECTION, SOLUTION INTRAVENOUS; SUBCUTANEOUS at 08:04

## 2023-04-12 RX ADMIN — ISOSORBIDE MONONITRATE 60 MG: 30 TABLET, EXTENDED RELEASE ORAL at 05:04

## 2023-04-12 RX ADMIN — CARVEDILOL 25 MG: 25 TABLET, FILM COATED ORAL at 07:04

## 2023-04-12 RX ADMIN — INSULIN ASPART 3 UNITS: 100 INJECTION, SOLUTION INTRAVENOUS; SUBCUTANEOUS at 12:04

## 2023-04-12 RX ADMIN — LEVETIRACETAM 500 MG: 500 TABLET, FILM COATED ORAL at 08:04

## 2023-04-12 RX ADMIN — LIDOCAINE 1 PATCH: 50 PATCH CUTANEOUS at 07:04

## 2023-04-12 RX ADMIN — PANTOPRAZOLE SODIUM 40 MG: 40 TABLET, DELAYED RELEASE ORAL at 08:04

## 2023-04-12 RX ADMIN — ONDANSETRON 4 MG: 2 INJECTION INTRAMUSCULAR; INTRAVENOUS at 09:04

## 2023-04-12 RX ADMIN — INSULIN ASPART 4 UNITS: 100 INJECTION, SOLUTION INTRAVENOUS; SUBCUTANEOUS at 12:04

## 2023-04-12 RX ADMIN — GABAPENTIN 300 MG: 300 CAPSULE ORAL at 08:04

## 2023-04-12 RX ADMIN — AMLODIPINE BESYLATE 5 MG: 5 TABLET ORAL at 05:04

## 2023-04-12 RX ADMIN — SODIUM ZIRCONIUM CYCLOSILICATE 10 G: 5 POWDER, FOR SUSPENSION ORAL at 09:04

## 2023-04-12 RX ADMIN — INSULIN ASPART 4 UNITS: 100 INJECTION, SOLUTION INTRAVENOUS; SUBCUTANEOUS at 04:04

## 2023-04-12 RX ADMIN — INSULIN ASPART 3 UNITS: 100 INJECTION, SOLUTION INTRAVENOUS; SUBCUTANEOUS at 04:04

## 2023-04-12 RX ADMIN — AMITRIPTYLINE HYDROCHLORIDE 50 MG: 25 TABLET, FILM COATED ORAL at 08:04

## 2023-04-12 RX ADMIN — HYDROMORPHONE HYDROCHLORIDE 1 MG: 1 INJECTION, SOLUTION INTRAMUSCULAR; INTRAVENOUS; SUBCUTANEOUS at 05:04

## 2023-04-12 RX ADMIN — FLUOXETINE 20 MG: 20 CAPSULE ORAL at 08:04

## 2023-04-12 RX ADMIN — DICYCLOMINE HYDROCHLORIDE 10 MG: 10 CAPSULE ORAL at 07:04

## 2023-04-12 RX ADMIN — LORAZEPAM 0.5 MG: 2 INJECTION INTRAMUSCULAR; INTRAVENOUS at 11:04

## 2023-04-12 RX ADMIN — METHOCARBAMOL 750 MG: 750 TABLET ORAL at 09:04

## 2023-04-12 RX ADMIN — METOCLOPRAMIDE 10 MG: 5 INJECTION, SOLUTION INTRAMUSCULAR; INTRAVENOUS at 03:04

## 2023-04-12 RX ADMIN — METOCLOPRAMIDE 10 MG: 5 INJECTION, SOLUTION INTRAMUSCULAR; INTRAVENOUS at 10:04

## 2023-04-12 RX ADMIN — CARVEDILOL 25 MG: 25 TABLET, FILM COATED ORAL at 04:04

## 2023-04-12 NOTE — ASSESSMENT & PLAN NOTE
Pt presented with severe abdominal pain x3 days, she states it has progressively worsened. Pt found to be in DKA, could possibly be contributing to pain. CTAP negative for bowel obstruction, significant for bladder wall thickening, pt denies any symptoms of agitated bladder, pt is anuric 2/2 ESRD. Of note pt admitted 7 times in 2023 for similar episodes of abdominal pain, per past medical records gastroparesis is suspected however not confirmed. Etiology of abdominal pain possibly DKA vs function abdominal pain vs gastroparesis     -inc tyl 1g, 1mg IV dilaudid prn for pain   -concern for chronic pain resembling fibromyalgia, will trial amitriptyline   capsaicin cream and lidocaine patch available  -zofran prn for nausea   -CTAP largely unchanged from previous, will not pursue further imaging at this time   -consider gastric emptying study once patient more stable   -see DKA for remainder of plan

## 2023-04-12 NOTE — PROGRESS NOTES
[facility] - [facility] Stepdown  Endocrinology  Progress Note    Admit Date: 4/10/2023     33 year old  female with type 2 diabetes mellitus, ESRD on HD (TTS), CHF, gastroparesis, sickle cell trait, seizure disorder, and hypertension.     - Patient sent to ED from endocrine clinic for severe hyperglycemia and concern for DKA    - Endocrinology consulted for management of severe hyperglycemia and type 2 diabetes mellitus    Regarding Type 2 diabetes mellitus:    Lab Results   Component Value Date    CO2 21 (L) 04/10/2023    CO2 25 2023    CO2 25 2023    CO2 26 2023    ANIONGAP 17 (H) 04/10/2023    ANIONGAP 15 2023    ANIONGAP 17 (H) 2023    ANIONGAP 6 (L) 2023    K 4.4 04/10/2023    K 3.1 (L) 2023    K 3.8 2023    EGFRNORACEVR 8.7 (A) 04/10/2023    EGFRNORACEVR 19.5 (A) 2023    BHYDRXBUT 0.9 (H) 04/10/2023    BHYDRXBUT 0.1 2023    HGBA1C 5.8 2023    HGBA1C 7.5 (H) 2022    HGBA1C 6.2 2022      Lab Results   Component Value Date    LIPASE 21 04/10/2023     Lab Results   Component Value Date     04/10/2023     2023     2023     (HH) 04/10/2023     (H) 2023     (H) 2023      - Corrected sodium calculator = 144 mEq/L    - Initially diagnosed with diabetes mellitus:  with gestational diabetes  - Home diabetic medications include: Patient unsure as her mother handles her insulin injections but she thinks she is on Lantus 18 units daily and NovoLog 8 units TIDWM  - Other medications tried: NA  - Weight based dosin kg x 0.3 (ESRD) = 16 TDD x 0.5 = 8 basal / 8 prandial  - 1700/TDD = 106 (estimated insulin sensitivity factor)  - 450/TDD = 28 (estimated starting carb ratio for prandial dosing)  - Family History:  Mother, grandmother, and cousins with type 2 diabetes mellitus      Interval HPI:   Overnight events: Low glucose values this morning after breakfast.  Dose  "adjustments to insulin.       /82 (BP Location: Right arm, Patient Position: Lying)   Pulse 76   Temp 98 °F (36.7 °C) (Oral)   Resp 18   Ht 5' 2" (1.575 m)   Wt 54 kg (119 lb)   LMP  (LMP Unknown)   SpO2 (!) 92%   Breastfeeding No   BMI 21.77 kg/m²     Labs Reviewed and Include    Recent Labs   Lab 04/11/23  1412      CALCIUM 8.7   *   K 4.3   CO2 23      BUN 12   CREATININE 2.9*     Lab Results   Component Value Date    WBC 4.76 04/11/2023    HGB 8.4 (L) 04/11/2023    HCT 26.2 (L) 04/11/2023    MCV 94 04/11/2023    PLT 96 (L) 04/11/2023     No results for input(s): TSH, FREET4 in the last 168 hours.  Lab Results   Component Value Date    HGBA1C 5.8 03/03/2023       Nutritional status:   Body mass index is 21.77 kg/m².  Lab Results   Component Value Date    ALBUMIN 3.5 04/10/2023    ALBUMIN 3.3 (L) 04/08/2023    ALBUMIN 3.4 (L) 04/05/2023     No results found for: PREALBUMIN    Estimated Creatinine Clearance: 21.6 mL/min (A) (based on SCr of 2.9 mg/dL (H)).    Accu-Checks  Recent Labs     04/11/23  0242 04/11/23  0433 04/11/23  0507 04/11/23  0528 04/11/23  0613 04/11/23  0813 04/11/23  0958 04/11/23  1337 04/11/23  1408 04/11/23  1715   POCTGLUCOSE 210* 82 64* 55* 160* 176* 129* 58* 115* 157*       Current Medications and/or Treatments Impacting Glycemic Control  Immunotherapy:    Immunosuppressants       None          Steroids:   Hormones (From admission, onward)      None          Pressors:    Autonomic Drugs (From admission, onward)      None          Hyperglycemia/Diabetes Medications:   Antihyperglycemics (From admission, onward)      Start     Stop Route Frequency Ordered    04/12/23 0900  insulin detemir U-100 (Levemir) pen 4 Units         -- SubQ 2 times daily 04/11/23 1411    04/11/23 1430  insulin aspart U-100 pen 2 Units         -- SubQ 3 times daily with meals 04/11/23 1427    04/11/23 0833  insulin aspart U-100 pen 0-5 Units         -- SubQ Before meals & nightly PRN " 04/11/23 0736            ASSESSMENT and PLAN    Renal/  ESRD (end stage renal disease) on dialysis  - High-risk for hypoglycemia as mentioned above, caution with adjustments to insulin  - Management of ESRD per Nephrology with HD    Endocrine  Type 2 diabetes mellitus with hyperglycemia, with long-term current use of insulin  - Presented with Mild DKA versus stress related hyperglycemia with unclear trigger; patient states adherence to insulin therapy with the exception of the morning of admission when she missed her morning Humalog  - High-risk of hypoglycemia given ESRD; has reportedly had recurring episodes of hypoglycemia home  - Improvement on labs, now back to MDI insulin    Plan:   - Continue Levemir 4 units BID (was higher but with lows this morning)  - Continue Novolog 2 units TIDAC  - Continue low dose sliding scale  - Hypoglycemia protocol PRN    GI  Gastroparesis - suspected, unconfirmed  - Likely contributor to patient's nausea rather than DKA, after chemistries improve and diabetic diet restarted, consider metoclopramide for symptomatic treatment        Maik Holcomb, DO  Endocrinology

## 2023-04-12 NOTE — SUBJECTIVE & OBJECTIVE
"Interval HPI:   Overnight events: Low glucose values this morning after breakfast.  Dose adjustments to insulin.       /82 (BP Location: Right arm, Patient Position: Lying)   Pulse 76   Temp 98 °F (36.7 °C) (Oral)   Resp 18   Ht 5' 2" (1.575 m)   Wt 54 kg (119 lb)   LMP  (LMP Unknown)   SpO2 (!) 92%   Breastfeeding No   BMI 21.77 kg/m²     Labs Reviewed and Include    Recent Labs   Lab 04/11/23  1412      CALCIUM 8.7   *   K 4.3   CO2 23      BUN 12   CREATININE 2.9*     Lab Results   Component Value Date    WBC 4.76 04/11/2023    HGB 8.4 (L) 04/11/2023    HCT 26.2 (L) 04/11/2023    MCV 94 04/11/2023    PLT 96 (L) 04/11/2023     No results for input(s): TSH, FREET4 in the last 168 hours.  Lab Results   Component Value Date    HGBA1C 5.8 03/03/2023       Nutritional status:   Body mass index is 21.77 kg/m².  Lab Results   Component Value Date    ALBUMIN 3.5 04/10/2023    ALBUMIN 3.3 (L) 04/08/2023    ALBUMIN 3.4 (L) 04/05/2023     No results found for: PREALBUMIN    Estimated Creatinine Clearance: 21.6 mL/min (A) (based on SCr of 2.9 mg/dL (H)).    Accu-Checks  Recent Labs     04/11/23  0242 04/11/23  0433 04/11/23  0507 04/11/23  0528 04/11/23  0613 04/11/23  0813 04/11/23  0958 04/11/23  1337 04/11/23  1408 04/11/23  1715   POCTGLUCOSE 210* 82 64* 55* 160* 176* 129* 58* 115* 157*       Current Medications and/or Treatments Impacting Glycemic Control  Immunotherapy:    Immunosuppressants       None          Steroids:   Hormones (From admission, onward)      None          Pressors:    Autonomic Drugs (From admission, onward)      None          Hyperglycemia/Diabetes Medications:   Antihyperglycemics (From admission, onward)      Start     Stop Route Frequency Ordered    04/12/23 0900  insulin detemir U-100 (Levemir) pen 4 Units         -- SubQ 2 times daily 04/11/23 1411    04/11/23 1430  insulin aspart U-100 pen 2 Units         -- SubQ 3 times daily with meals 04/11/23 1427    " 04/11/23 0833  insulin aspart U-100 pen 0-5 Units         -- SubQ Before meals & nightly PRN 04/11/23 0736

## 2023-04-12 NOTE — ASSESSMENT & PLAN NOTE
- Presented with Mild DKA versus stress related hyperglycemia with unclear trigger; patient states adherence to insulin therapy with the exception of the morning of admission when she missed her morning Humalog  - High-risk of hypoglycemia given ESRD; has reportedly had recurring episodes of hypoglycemia home  - Significant hyperglycemia overnight either from uncovered carb intake or inadequate insulin dosing    Plan:   - Increase Levemir to 6 units BID (given rise overnight)  - Increase Novolog to 4 units TIDAC  - Continue low dose sliding scale  - Hypoglycemia protocol PRN

## 2023-04-12 NOTE — PROGRESS NOTES
AAOx4. VSS. Patient up ad pratima - ambulates in room and bathroom independently. Patient is anuric - on HD (T, Th, Sat) - HD scheduled for tomorrow 4/13. K 5.4 on AM labs - Lokelma given - K 4.8 on repeat BMP. PRN dilaudid d/c'd - patient c/o pain - refusing PRN tylenol - team notified. Ordered scheduled capsaicin cream and lidocaine patch - patient refusing both - Dr. Winslow notified - verbal given saying that is the only other pain interventions we can offer at this time. BS>500 on accuchecks (>600 on labs) - Dr. Holcomb notified - ordered an extra 6units aspart -  on recheck - Dr. Holcomb notified - ordered accuchecks q1hr.  at lunch - q1hr accuchecks order d/c'd per verbal order from Dr. Holcomb.  on repeat BMP at 1400.  at dinner. Non-skid socks on. Bed in low position. Call light within reach.

## 2023-04-12 NOTE — ASSESSMENT & PLAN NOTE
Patient's FSGs are uncontrolled due to hyperglycemia on current medication regimen. B hydroxybutarate elevated to 0.9. Anion gap of 17. Initial glucose of 509   Last A1c reviewed-   Lab Results   Component Value Date    HGBA1C 5.8 03/03/2023     Most recent fingerstick glucose reviewed-   Recent Labs   Lab 04/12/23  0952 04/12/23  1033 04/12/23  1133 04/12/23  1214   POCTGLUCOSE >500* >500* 416* 320*     Current correctional scale  Low  Maintain anti-hyperglycemic dose as follows-   Antihyperglycemics (From admission, onward)    Start     Stop Route Frequency Ordered    04/12/23 2100  insulin detemir U-100 (Levemir) pen 6 Units         -- SubQ 2 times daily 04/12/23 1317    04/12/23 1645  insulin aspart U-100 pen 4 Units         -- SubQ 3 times daily with meals 04/12/23 1321    04/12/23 1418  insulin aspart U-100 pen 0-5 Units         -- SubQ Before meals & nightly PRN 04/12/23 1320        Hold Oral hypoglycemics while patient is in the hospital.  -Endocrinology consulted, will appreciate recs  -caution with IVF administration given ESRD and elevated BNP

## 2023-04-12 NOTE — SUBJECTIVE & OBJECTIVE
Interval History: Pt endorses significant abdominal pain likely chronic in nature. Pt refuses all prn and scheduled pain medications, states she only wants dilaudid. Pt expressed wanting to go to another hospital when team stated we would not be escalating pain regimen. Team discussed with patient in length that we would like to attempt other methods of pain control so that we do no increase the likelihood of patient developing opioid dependence.    Review of Systems   Constitutional:  Positive for appetite change. Negative for activity change, chills and fever.   HENT:  Negative for congestion, rhinorrhea, sinus pressure, sinus pain, sneezing and sore throat.    Eyes:  Positive for visual disturbance.   Respiratory:  Positive for chest tightness. Negative for apnea, cough, choking and shortness of breath.    Cardiovascular:  Positive for chest pain. Negative for palpitations and leg swelling.   Gastrointestinal:  Positive for abdominal pain, nausea and vomiting. Negative for abdominal distention, constipation and diarrhea.   Endocrine: Negative for polyphagia and polyuria.   Genitourinary:  Negative for dysuria, flank pain and urgency.   Musculoskeletal:  Positive for back pain. Negative for arthralgias, joint swelling, myalgias, neck pain and neck stiffness.   Neurological:  Negative for dizziness, weakness, light-headedness and headaches.   Psychiatric/Behavioral:  Negative for agitation. The patient is nervous/anxious.    Objective:     Vital Signs (Most Recent):  Temp: 98.2 °F (36.8 °C) (04/12/23 1307)  Pulse: 90 (04/12/23 1307)  Resp: 18 (04/12/23 1307)  BP: 133/79 (04/12/23 1307)  SpO2: 96 % (04/12/23 1307) Vital Signs (24h Range):  Temp:  [97.9 °F (36.6 °C)-99 °F (37.2 °C)] 98.2 °F (36.8 °C)  Pulse:  [76-90] 90  Resp:  [16-20] 18  SpO2:  [90 %-98 %] 96 %  BP: (133-174)/() 133/79     Weight: 54 kg (119 lb)  Body mass index is 21.77 kg/m².    Intake/Output Summary (Last 24 hours) at 4/12/2023 1617  Last  data filed at 4/12/2023 1231  Gross per 24 hour   Intake 480 ml   Output --   Net 480 ml      Physical Exam  Constitutional:       General: She is in acute distress.      Appearance: She is ill-appearing. She is not diaphoretic.   HENT:      Head: Normocephalic and atraumatic.      Mouth/Throat:      Mouth: Mucous membranes are moist.      Pharynx: Oropharynx is clear.   Eyes:      Conjunctiva/sclera: Conjunctivae normal.   Cardiovascular:      Rate and Rhythm: Tachycardia present.      Pulses: Normal pulses.      Heart sounds: Normal heart sounds.   Pulmonary:      Effort: Pulmonary effort is normal. No respiratory distress.      Breath sounds: Normal breath sounds. No stridor. No wheezing or rhonchi.   Abdominal:      General: Abdomen is flat. There is no distension.      Palpations: Abdomen is soft.      Tenderness: There is abdominal tenderness. There is no guarding or rebound.      Comments: Hypoactive bowel sounds, diffuse tenderness to palpation   Musculoskeletal:         General: Normal range of motion.      Cervical back: Normal range of motion and neck supple.      Right lower leg: No edema.      Left lower leg: No edema.   Skin:     General: Skin is warm and dry.   Neurological:      General: No focal deficit present.      Mental Status: She is oriented to person, place, and time.       Significant Labs: All pertinent labs within the past 24 hours have been reviewed.    Significant Imaging: I have reviewed all pertinent imaging results/findings within the past 24 hours.

## 2023-04-12 NOTE — PLAN OF CARE
Pt aaox4. VSS on RA. HD yesterday, 3 L off. RCW perm cath in place. AC/HS accuchecks monitored. Pt c/o of abd pain, prn dilaudid and heat packs applied. Pt refused tylenol. Prn reglan administered for nausea. Pt up independently to bathroom. VS and assessments in flowsheets.

## 2023-04-12 NOTE — SUBJECTIVE & OBJECTIVE
"Interval HPI:   Overnight events:  Some low glucose levels during day yesterday and now into the 600s this morning without evidence of DKA      BP (!) 174/106 (BP Location: Left arm, Patient Position: Lying)   Pulse 89   Temp 97.9 °F (36.6 °C) (Oral)   Resp 16   Ht 5' 2" (1.575 m)   Wt 54 kg (119 lb)   LMP  (LMP Unknown)   SpO2 (!) 90%   Breastfeeding No   BMI 21.77 kg/m²     Labs Reviewed and Include    Recent Labs   Lab 04/12/23  0627   *   CALCIUM 8.3*   *   K 5.4*   CO2 21*   CL 99   BUN 26*   CREATININE 4.9*     Lab Results   Component Value Date    WBC 4.31 04/12/2023    HGB 8.4 (L) 04/12/2023    HCT 26.9 (L) 04/12/2023    MCV 95 04/12/2023     (L) 04/12/2023     No results for input(s): TSH, FREET4 in the last 168 hours.  Lab Results   Component Value Date    HGBA1C 5.8 03/03/2023       Nutritional status:   Body mass index is 21.77 kg/m².  Lab Results   Component Value Date    ALBUMIN 3.5 04/10/2023    ALBUMIN 3.3 (L) 04/08/2023    ALBUMIN 3.4 (L) 04/05/2023     No results found for: PREALBUMIN    Estimated Creatinine Clearance: 12.8 mL/min (A) (based on SCr of 4.9 mg/dL (H)).    Accu-Checks  Recent Labs     04/11/23  0433 04/11/23  0507 04/11/23  0528 04/11/23  0613 04/11/23  0813 04/11/23  0958 04/11/23  1337 04/11/23  1408 04/11/23  1715 04/11/23  2129   POCTGLUCOSE 82 64* 55* 160* 176* 129* 58* 115* 157* 296*       Current Medications and/or Treatments Impacting Glycemic Control  Immunotherapy:    Immunosuppressants       None          Steroids:   Hormones (From admission, onward)      None          Pressors:    Autonomic Drugs (From admission, onward)      None          Hyperglycemia/Diabetes Medications:   Antihyperglycemics (From admission, onward)      Start     Stop Route Frequency Ordered    04/12/23 0900  insulin detemir U-100 (Levemir) pen 6 Units         -- SubQ 2 times daily 04/12/23 0802    04/12/23 0815  insulin aspart U-100 pen 3 Units         -- SubQ 3 times " daily with meals 04/12/23 0802    04/11/23 0833  insulin aspart U-100 pen 0-5 Units         -- SubQ Before meals & nightly PRN 04/11/23 0714

## 2023-04-12 NOTE — NURSING
Pt's SBP elevated >170 this am. Notified Dr. Bhardwaj with IM1. Ordered to give 0900 amlodipine and imdur early this am. DAV

## 2023-04-12 NOTE — ASSESSMENT & PLAN NOTE
- Presented with Mild DKA versus stress related hyperglycemia with unclear trigger; patient states adherence to insulin therapy with the exception of the morning of admission when she missed her morning Humalog  - High-risk of hypoglycemia given ESRD; has reportedly had recurring episodes of hypoglycemia home  - Improvement on labs, now back to MDI insulin    Plan:   - Continue Levemir 4 units BID (was higher but with lows this morning)  - Continue Novolog 2 units TIDAC  - Continue low dose sliding scale  - Hypoglycemia protocol PRN

## 2023-04-12 NOTE — PROGRESS NOTES
[facility] - [facility]  Endocrinology  Progress Note    Admit Date: 4/10/2023     33 year old  female with type 2 diabetes mellitus, ESRD on HD (TTS), CHF, gastroparesis, sickle cell trait, seizure disorder, and hypertension.     - Patient sent to ED from endocrine clinic for severe hyperglycemia and concern for DKA    - Endocrinology consulted for management of severe hyperglycemia and type 2 diabetes mellitus    Regarding Type 2 diabetes mellitus:    Lab Results   Component Value Date    CO2 21 (L) 04/10/2023    CO2 25 2023    CO2 25 2023    CO2 26 2023    ANIONGAP 17 (H) 04/10/2023    ANIONGAP 15 2023    ANIONGAP 17 (H) 2023    ANIONGAP 6 (L) 2023    K 4.4 04/10/2023    K 3.1 (L) 2023    K 3.8 2023    EGFRNORACEVR 8.7 (A) 04/10/2023    EGFRNORACEVR 19.5 (A) 2023    BHYDRXBUT 0.9 (H) 04/10/2023    BHYDRXBUT 0.1 2023    HGBA1C 5.8 2023    HGBA1C 7.5 (H) 2022    HGBA1C 6.2 2022      Lab Results   Component Value Date    LIPASE 21 04/10/2023     Lab Results   Component Value Date     04/10/2023     2023     2023     (HH) 04/10/2023     (H) 2023     (H) 2023      - Corrected sodium calculator = 144 mEq/L    - Initially diagnosed with diabetes mellitus:  with gestational diabetes  - Home diabetic medications include: Patient unsure as her mother handles her insulin injections but she thinks she is on Lantus 18 units daily and NovoLog 8 units TIDWM  - Other medications tried: NA  - Weight based dosin kg x 0.3 (ESRD) = 16 TDD x 0.5 = 8 basal / 8 prandial  - 1700/TDD = 106 (estimated insulin sensitivity factor)  - 450/TDD = 28 (estimated starting carb ratio for prandial dosing)  - Family History:  Mother, grandmother, and cousins with type 2 diabetes mellitus      Interval HPI:   Overnight events:  Some low glucose levels during day yesterday and now  "into the 600s this morning without evidence of DKA      BP (!) 174/106 (BP Location: Left arm, Patient Position: Lying)   Pulse 89   Temp 97.9 °F (36.6 °C) (Oral)   Resp 16   Ht 5' 2" (1.575 m)   Wt 54 kg (119 lb)   LMP  (LMP Unknown)   SpO2 (!) 90%   Breastfeeding No   BMI 21.77 kg/m²     Labs Reviewed and Include    Recent Labs   Lab 04/12/23  0627   *   CALCIUM 8.3*   *   K 5.4*   CO2 21*   CL 99   BUN 26*   CREATININE 4.9*     Lab Results   Component Value Date    WBC 4.31 04/12/2023    HGB 8.4 (L) 04/12/2023    HCT 26.9 (L) 04/12/2023    MCV 95 04/12/2023     (L) 04/12/2023     No results for input(s): TSH, FREET4 in the last 168 hours.  Lab Results   Component Value Date    HGBA1C 5.8 03/03/2023       Nutritional status:   Body mass index is 21.77 kg/m².  Lab Results   Component Value Date    ALBUMIN 3.5 04/10/2023    ALBUMIN 3.3 (L) 04/08/2023    ALBUMIN 3.4 (L) 04/05/2023     No results found for: PREALBUMIN    Estimated Creatinine Clearance: 12.8 mL/min (A) (based on SCr of 4.9 mg/dL (H)).    Accu-Checks  Recent Labs     04/11/23  0433 04/11/23  0507 04/11/23  0528 04/11/23  0613 04/11/23  0813 04/11/23  0958 04/11/23  1337 04/11/23  1408 04/11/23  1715 04/11/23  2129   POCTGLUCOSE 82 64* 55* 160* 176* 129* 58* 115* 157* 296*       Current Medications and/or Treatments Impacting Glycemic Control  Immunotherapy:    Immunosuppressants       None          Steroids:   Hormones (From admission, onward)      None          Pressors:    Autonomic Drugs (From admission, onward)      None          Hyperglycemia/Diabetes Medications:   Antihyperglycemics (From admission, onward)      Start     Stop Route Frequency Ordered    04/12/23 0900  insulin detemir U-100 (Levemir) pen 6 Units         -- SubQ 2 times daily 04/12/23 0802    04/12/23 0815  insulin aspart U-100 pen 3 Units         -- SubQ 3 times daily with meals 04/12/23 0802    04/11/23 0833  insulin aspart U-100 pen 0-5 Units         " -- SubQ Before meals & nightly PRN 04/11/23 0736            ASSESSMENT and PLAN    Renal/  ESRD (end stage renal disease) on dialysis  - High-risk for hypoglycemia as mentioned above, caution with adjustments to insulin  - Management of ESRD per Nephrology with HD    Endocrine  Type 2 diabetes mellitus with hyperglycemia, with long-term current use of insulin  - Presented with Mild DKA versus stress related hyperglycemia with unclear trigger; patient states adherence to insulin therapy with the exception of the morning of admission when she missed her morning Humalog  - High-risk of hypoglycemia given ESRD; has reportedly had recurring episodes of hypoglycemia home  - Significant hyperglycemia overnight either from uncovered carb intake or inadequate insulin dosing    Plan:   - Increase Levemir to 6 units BID (given rise overnight)  - Increase Novolog to 4 units TIDAC  - Continue low dose sliding scale  - Hypoglycemia protocol PRN    GI  Gastroparesis - suspected, unconfirmed  - Likely contributor to patient's nausea rather than DKA, after chemistries improve and diabetic diet restarted  - metoclopramide ordered by primary        Maik Holcomb DO  Endocrinology

## 2023-04-12 NOTE — PROGRESS NOTES
[facility] - Transplant [facility] Medicine  Progress Note    Patient Name: Tabby Howard  MRN: [id number]  Patient Class: IP- Inpatient   Admission Date: 4/10/2023  Length of Stay: 1 days  Attending Physician: Sharif Barone MD  Primary Care Provider: AIDEN CONNER NP        Subjective:     Principal Problem:DKA (diabetic ketoacidosis)        HPI:  Pt is a 33 y.o. female PMH of ESRD on HD T/Th/S, T2DM on insulin, HFpEF, HTN, unknown seizure disorder, DVT of LUE on apixiban, who [place] [facility] [place] prompting from oupt endocrinology appointment when pt was discovered to have glucose >500 accompanied by N/V and abdominal pain. Pt reports abdominal pain began 3 days ago and has progressively worsened. She also reports chest pain and low central back pain that also began 3 days ago. With regards to chest pain patient explains it feels like pressure and has been consistent since onset, also endorses orthopnea. Pt is unable to described abdominal pain due to being in significant pain at time of exam. Of note pt was recently admitted for abdominal pain, was discharged from this admission on 4/8, she states that this episode of pain is different from her prior admission. Pt denies missing any dialysis sessions, reports no changes in her diet prior to onset of abdominal pain or N/V. States that her glucose has been in the 200s for the past 3 days concurrently with the abdominal pain, and that she has not missed any doses of home insulin.     While in ED CTAP was obtained, negative for bowel obstruction, significant for bladder wall thickening concerning for cystitis. BNP elevated to 2,187, CXR with marked cardiomegaly, but there has been no significant detrimental interval change, no pleural effusion. Initial trop elevated to 0.042, now down trending. Glucose elevated to 509, B hydroxybutarate elevated, anion gap elevated. Pt admitted to hospital medicine with DKA, endocrine consult by ED, recommended  insulin gtt and K containing IVF.       Overview/Hospital Course:  Pt glucose levels sensitive to insulin gtt, with periodic episodes of hypoglycemia with subsequent holding of drip. Pt met criteria to transition to sub q insulin, pt transitioned on 4/11 AM, diet started. HD performed on 4/11. Abdominal pain control evolving, attempting control with multimodal regimen.       Interval History: Pt endorses significant abdominal pain likely chronic in nature. Pt refuses all prn and scheduled pain medications, states she only wants dilaudid. Pt expressed wanting to go to another hospital when team stated we would not be escalating pain regimen. Team discussed with patient in length that we would like to attempt other methods of pain control so that we do no increase the likelihood of patient developing opioid dependence.    Review of Systems   Constitutional:  Positive for appetite change. Negative for activity change, chills and fever.   HENT:  Negative for congestion, rhinorrhea, sinus pressure, sinus pain, sneezing and sore throat.    Eyes:  Positive for visual disturbance.   Respiratory:  Positive for chest tightness. Negative for apnea, cough, choking and shortness of breath.    Cardiovascular:  Positive for chest pain. Negative for palpitations and leg swelling.   Gastrointestinal:  Positive for abdominal pain, nausea and vomiting. Negative for abdominal distention, constipation and diarrhea.   Endocrine: Negative for polyphagia and polyuria.   Genitourinary:  Negative for dysuria, flank pain and urgency.   Musculoskeletal:  Positive for back pain. Negative for arthralgias, joint swelling, myalgias, neck pain and neck stiffness.   Neurological:  Negative for dizziness, weakness, light-headedness and headaches.   Psychiatric/Behavioral:  Negative for agitation. The patient is nervous/anxious.    Objective:     Vital Signs (Most Recent):  Temp: 98.2 °F (36.8 °C) (04/12/23 1307)  Pulse: 90 (04/12/23 1307)  Resp: 18  (04/12/23 1307)  BP: 133/79 (04/12/23 1307)  SpO2: 96 % (04/12/23 1307) Vital Signs (24h Range):  Temp:  [97.9 °F (36.6 °C)-99 °F (37.2 °C)] 98.2 °F (36.8 °C)  Pulse:  [76-90] 90  Resp:  [16-20] 18  SpO2:  [90 %-98 %] 96 %  BP: (133-174)/() 133/79     Weight: 54 kg (119 lb)  Body mass index is 21.77 kg/m².    Intake/Output Summary (Last 24 hours) at 4/12/2023 1617  Last data filed at 4/12/2023 1231  Gross per 24 hour   Intake 480 ml   Output --   Net 480 ml      Physical Exam  Constitutional:       General: She is in acute distress.      Appearance: She is ill-appearing. She is not diaphoretic.   HENT:      Head: Normocephalic and atraumatic.      Mouth/Throat:      Mouth: Mucous membranes are moist.      Pharynx: Oropharynx is clear.   Eyes:      Conjunctiva/sclera: Conjunctivae normal.   Cardiovascular:      Rate and Rhythm: Tachycardia present.      Pulses: Normal pulses.      Heart sounds: Normal heart sounds.   Pulmonary:      Effort: Pulmonary effort is normal. No respiratory distress.      Breath sounds: Normal breath sounds. No stridor. No wheezing or rhonchi.   Abdominal:      General: Abdomen is flat. There is no distension.      Palpations: Abdomen is soft.      Tenderness: There is abdominal tenderness. There is no guarding or rebound.      Comments: Hypoactive bowel sounds, diffuse tenderness to palpation   Musculoskeletal:         General: Normal range of motion.      Cervical back: Normal range of motion and neck supple.      Right lower leg: No edema.      Left lower leg: No edema.   Skin:     General: Skin is warm and dry.   Neurological:      General: No focal deficit present.      Mental Status: She is oriented to person, place, and time.       Significant Labs: All pertinent labs within the past 24 hours have been reviewed.    Significant Imaging: I have reviewed all pertinent imaging results/findings within the past 24 hours.      Assessment/Plan:      * DKA (diabetic  ketoacidosis)  Patient's FSGs are uncontrolled due to hyperglycemia on current medication regimen. B hydroxybutarate elevated to 0.9. Anion gap of 17. Initial glucose of 509   Last A1c reviewed-   Lab Results   Component Value Date    HGBA1C 5.8 03/03/2023     Most recent fingerstick glucose reviewed-   Recent Labs   Lab 04/12/23  0952 04/12/23  1033 04/12/23  1133 04/12/23  1214   POCTGLUCOSE >500* >500* 416* 320*     Current correctional scale  Low  Maintain anti-hyperglycemic dose as follows-   Antihyperglycemics (From admission, onward)    Start     Stop Route Frequency Ordered    04/12/23 2100  insulin detemir U-100 (Levemir) pen 6 Units         -- SubQ 2 times daily 04/12/23 1317    04/12/23 1645  insulin aspart U-100 pen 4 Units         -- SubQ 3 times daily with meals 04/12/23 1321    04/12/23 1418  insulin aspart U-100 pen 0-5 Units         -- SubQ Before meals & nightly PRN 04/12/23 1320        Hold Oral hypoglycemics while patient is in the hospital.  -Endocrinology consulted, will appreciate recs  -caution with IVF administration given ESRD and elevated BNP      Mood disorder  Cont home fluoxetine       Chronic deep vein thrombosis (DVT) of left upper extremity  Cont home apixiban       Anemia in ESRD (end-stage renal disease)  Pt with known history of anemia in setting of ESRD. Hgb of 8.4 at time of exam     -trend daily CBC   -transfuse if Hbg <7      Congestive heart failure with left ventricular diastolic dysfunction  Patient is identified as having Diastolic (HFpEF) heart failure that is Acute on chronic. CHF is currently uncontrolled due to >3 pillow orthopnea. Latest ECHO performed and demonstrates- Results for orders placed during the hospital encounter of 01/12/23    Echo    Interpretation Summary  · The left ventricle is normal in size with moderate concentric hypertrophy and normal systolic function.  · The estimated ejection fraction is 55%.  · Grade III left ventricular diastolic  dysfunction.  · Normal right ventricular size with normal right ventricular systolic function.  · Moderate left atrial enlargement.  · Moderate to severe tricuspid regurgitation.  · Mild to moderate pulmonic regurgitation.  · Mild mitral regurgitation.  · Normal central venous pressure (3 mmHg).  · The estimated PA systolic pressure is 56 mmHg.  · There is pulmonary hypertension.  · Moderate to large circumferential pericardial effusion. No evidence of tamponade.  . Continue Beta Blocker and monitor clinical status closely. Monitor on telemetry. Patient is off CHF pathway.  Monitor strict Is&Os and daily weights.  Place on fluid restriction of 1.5 L. Continue to stress to patient importance of self efficacy and  on diet for CHF. Last BNP reviewed- and noted below   Recent Labs   Lab 04/10/23  1031   BNP 2,187*   .      Demand ischemia  Pt with chronically elevated trop, initial level of 0.042 upon admission. Trop now downtrending. Likely demand ischemia in setting of elevated BNP and known hx of HpEF    -no acute interventions at this time, will cont to monitor      Type 2 diabetes mellitus with hyperglycemia, with long-term current use of insulin  See DKA    Abdominal pain  Pt presented with severe abdominal pain x3 days, she states it has progressively worsened. Pt found to be in DKA, could possibly be contributing to pain. CTAP negative for bowel obstruction, significant for bladder wall thickening, pt denies any symptoms of agitated bladder, pt is anuric 2/2 ESRD. Of note pt admitted 7 times in 2023 for similar episodes of abdominal pain, per past medical records gastroparesis is suspected however not confirmed. Etiology of abdominal pain possibly DKA vs function abdominal pain vs gastroparesis     -inc tyl 1g, 1mg IV dilaudid prn for pain   -concern for chronic pain resembling fibromyalgia, will trial amitriptyline   capsaicin cream and lidocaine patch available  -zofran prn for nausea   -CTAP largely  unchanged from previous, will not pursue further imaging at this time   -consider gastric emptying study once patient more stable   -see DKA for remainder of plan      Uncontrolled hypertension  Pt with hx of HTN, home regimen including norvasc 10mg, coreg 25mg, clonidine patch, hydralazine 100mg, imdur 60mg. /111 at time of initial exam, considerable pain likely contributing to elevation     -coreg 25mg daily  -amlodipine 5mg daily  -hydralazine 100mg q12h  -imdur 60mg daily     ESRD (end stage renal disease) on dialysis  Pt with ESRD on dialysis T/Th/S. Pt reports having not missed a dialysis session in the past week. Pt does not make urine     -nephrology consulted for dialysis, will appreciate recs  -HD completed on 4/11      Seizure disorder  Cont home keppra      Gastroparesis - suspected, unconfirmed  See abdominal pain        VTE Risk Mitigation (From admission, onward)         Ordered     heparin (porcine) injection 1,000 Units  As needed (PRN)         04/11/23 1115     apixaban tablet 5 mg  2 times daily         04/10/23 1518                Discharge Planning   TATIANA: 4/14/2023     Code Status: Full Code   Is the patient medically ready for discharge?:     Reason for patient still in hospital (select all that apply): Patient trending condition and Treatment     Discharge Plan A:  (TBD)                  Violette Winslow MD  Department of Hospital Medicine   [facility] - Transplant Stepdown

## 2023-04-12 NOTE — ASSESSMENT & PLAN NOTE
- Likely contributor to patient's nausea rather than DKA, after chemistries improve and diabetic diet restarted  - metoclopramide ordered by primary

## 2023-04-13 PROBLEM — I50.32 CHRONIC CONGESTIVE HEART FAILURE WITH LEFT VENTRICULAR DIASTOLIC DYSFUNCTION: Status: ACTIVE | Noted: 2023-03-02

## 2023-04-13 PROBLEM — R11.10 VOMITING: Status: ACTIVE | Noted: 2023-04-13

## 2023-04-13 LAB
ALBUMIN SERPL BCP-MCNC: 3.3 G/DL (ref 3.5–5.2)
ALP SERPL-CCNC: 368 U/L (ref 55–135)
ALT SERPL W/O P-5'-P-CCNC: 28 U/L (ref 10–44)
ANION GAP SERPL CALC-SCNC: 14 MMOL/L (ref 8–16)
AST SERPL-CCNC: 24 U/L (ref 10–40)
BASOPHILS # BLD AUTO: 0.02 K/UL (ref 0–0.2)
BASOPHILS NFR BLD: 0.4 % (ref 0–1.9)
BILIRUB SERPL-MCNC: 1.1 MG/DL (ref 0.1–1)
BUN SERPL-MCNC: 38 MG/DL (ref 6–20)
CALCIUM SERPL-MCNC: 9 MG/DL (ref 8.7–10.5)
CHLORIDE SERPL-SCNC: 101 MMOL/L (ref 95–110)
CO2 SERPL-SCNC: 18 MMOL/L (ref 23–29)
CREAT SERPL-MCNC: 6.1 MG/DL (ref 0.5–1.4)
DIFFERENTIAL METHOD: ABNORMAL
EOSINOPHIL # BLD AUTO: 0.1 K/UL (ref 0–0.5)
EOSINOPHIL NFR BLD: 2.6 % (ref 0–8)
ERYTHROCYTE [DISTWIDTH] IN BLOOD BY AUTOMATED COUNT: 18.9 % (ref 11.5–14.5)
EST. GFR  (NO RACE VARIABLE): 8.7 ML/MIN/1.73 M^2
GLUCOSE SERPL-MCNC: 224 MG/DL (ref 70–110)
HCT VFR BLD AUTO: 28.7 % (ref 37–48.5)
HGB BLD-MCNC: 9.2 G/DL (ref 12–16)
IMM GRANULOCYTES # BLD AUTO: 0.04 K/UL (ref 0–0.04)
IMM GRANULOCYTES NFR BLD AUTO: 0.9 % (ref 0–0.5)
LYMPHOCYTES # BLD AUTO: 0.7 K/UL (ref 1–4.8)
LYMPHOCYTES NFR BLD: 14.5 % (ref 18–48)
MAGNESIUM SERPL-MCNC: 2.1 MG/DL (ref 1.6–2.6)
MCH RBC QN AUTO: 29.7 PG (ref 27–31)
MCHC RBC AUTO-ENTMCNC: 32.1 G/DL (ref 32–36)
MCV RBC AUTO: 93 FL (ref 82–98)
MONOCYTES # BLD AUTO: 0.3 K/UL (ref 0.3–1)
MONOCYTES NFR BLD: 6.6 % (ref 4–15)
NEUTROPHILS # BLD AUTO: 3.4 K/UL (ref 1.8–7.7)
NEUTROPHILS NFR BLD: 75 % (ref 38–73)
NRBC BLD-RTO: 0 /100 WBC
PHOSPHATE SERPL-MCNC: 4.7 MG/DL (ref 2.7–4.5)
PLATELET # BLD AUTO: 101 K/UL (ref 150–450)
PMV BLD AUTO: 11 FL (ref 9.2–12.9)
POCT GLUCOSE: 108 MG/DL (ref 70–110)
POCT GLUCOSE: 156 MG/DL (ref 70–110)
POCT GLUCOSE: 209 MG/DL (ref 70–110)
POCT GLUCOSE: 220 MG/DL (ref 70–110)
POTASSIUM SERPL-SCNC: 4.7 MMOL/L (ref 3.5–5.1)
PROT SERPL-MCNC: 7.3 G/DL (ref 6–8.4)
RBC # BLD AUTO: 3.1 M/UL (ref 4–5.4)
SODIUM SERPL-SCNC: 133 MMOL/L (ref 136–145)
WBC # BLD AUTO: 4.55 K/UL (ref 3.9–12.7)

## 2023-04-13 PROCEDURE — 84100 ASSAY OF PHOSPHORUS: CPT | Performed by: STUDENT IN AN ORGANIZED HEALTH CARE EDUCATION/TRAINING PROGRAM

## 2023-04-13 PROCEDURE — 85025 COMPLETE CBC W/AUTO DIFF WBC: CPT | Performed by: STUDENT IN AN ORGANIZED HEALTH CARE EDUCATION/TRAINING PROGRAM

## 2023-04-13 PROCEDURE — 99232 PR SUBSEQUENT HOSPITAL CARE,LEVL II: ICD-10-PCS | Mod: ,,, | Performed by: INTERNAL MEDICINE

## 2023-04-13 PROCEDURE — 99233 PR SUBSEQUENT HOSPITAL CARE,LEVL III: ICD-10-PCS | Mod: ,,, | Performed by: NURSE PRACTITIONER

## 2023-04-13 PROCEDURE — 20600001 HC STEP DOWN PRIVATE ROOM

## 2023-04-13 PROCEDURE — 25000003 PHARM REV CODE 250

## 2023-04-13 PROCEDURE — 63600175 PHARM REV CODE 636 W HCPCS

## 2023-04-13 PROCEDURE — 83735 ASSAY OF MAGNESIUM: CPT | Performed by: STUDENT IN AN ORGANIZED HEALTH CARE EDUCATION/TRAINING PROGRAM

## 2023-04-13 PROCEDURE — 99233 SBSQ HOSP IP/OBS HIGH 50: CPT | Mod: ,,, | Performed by: NURSE PRACTITIONER

## 2023-04-13 PROCEDURE — 99222 PR INITIAL HOSPITAL CARE,LEVL II: ICD-10-PCS | Mod: ,,, | Performed by: INTERNAL MEDICINE

## 2023-04-13 PROCEDURE — 99222 1ST HOSP IP/OBS MODERATE 55: CPT | Mod: ,,, | Performed by: INTERNAL MEDICINE

## 2023-04-13 PROCEDURE — 99232 SBSQ HOSP IP/OBS MODERATE 35: CPT | Mod: ,,, | Performed by: HOSPITALIST

## 2023-04-13 PROCEDURE — 99232 SBSQ HOSP IP/OBS MODERATE 35: CPT | Mod: ,,, | Performed by: INTERNAL MEDICINE

## 2023-04-13 PROCEDURE — 36415 COLL VENOUS BLD VENIPUNCTURE: CPT | Performed by: STUDENT IN AN ORGANIZED HEALTH CARE EDUCATION/TRAINING PROGRAM

## 2023-04-13 PROCEDURE — 99232 PR SUBSEQUENT HOSPITAL CARE,LEVL II: ICD-10-PCS | Mod: ,,, | Performed by: HOSPITALIST

## 2023-04-13 PROCEDURE — 25000003 PHARM REV CODE 250: Performed by: STUDENT IN AN ORGANIZED HEALTH CARE EDUCATION/TRAINING PROGRAM

## 2023-04-13 PROCEDURE — 80053 COMPREHEN METABOLIC PANEL: CPT | Performed by: STUDENT IN AN ORGANIZED HEALTH CARE EDUCATION/TRAINING PROGRAM

## 2023-04-13 PROCEDURE — 25000003 PHARM REV CODE 250: Performed by: HOSPITALIST

## 2023-04-13 RX ORDER — MUPIROCIN 20 MG/G
OINTMENT TOPICAL 2 TIMES DAILY
Status: DISCONTINUED | OUTPATIENT
Start: 2023-04-13 | End: 2023-04-14 | Stop reason: HOSPADM

## 2023-04-13 RX ORDER — SUCRALFATE 1 G/10ML
1 SUSPENSION ORAL EVERY 6 HOURS
Status: DISCONTINUED | OUTPATIENT
Start: 2023-04-13 | End: 2023-04-13

## 2023-04-13 RX ORDER — SUCRALFATE 1 G/10ML
1 SUSPENSION ORAL
Status: DISCONTINUED | OUTPATIENT
Start: 2023-04-13 | End: 2023-04-14 | Stop reason: HOSPADM

## 2023-04-13 RX ORDER — ACETAMINOPHEN 500 MG
1000 TABLET ORAL EVERY 6 HOURS PRN
Status: DISCONTINUED | OUTPATIENT
Start: 2023-04-13 | End: 2023-04-14 | Stop reason: HOSPADM

## 2023-04-13 RX ADMIN — INSULIN ASPART 1 UNITS: 100 INJECTION, SOLUTION INTRAVENOUS; SUBCUTANEOUS at 10:04

## 2023-04-13 RX ADMIN — HYDRALAZINE HYDROCHLORIDE 100 MG: 50 TABLET ORAL at 08:04

## 2023-04-13 RX ADMIN — GABAPENTIN 300 MG: 300 CAPSULE ORAL at 08:04

## 2023-04-13 RX ADMIN — APIXABAN 5 MG: 5 TABLET, FILM COATED ORAL at 09:04

## 2023-04-13 RX ADMIN — LEVETIRACETAM 500 MG: 500 TABLET, FILM COATED ORAL at 08:04

## 2023-04-13 RX ADMIN — LEVETIRACETAM 500 MG: 500 TABLET, FILM COATED ORAL at 09:04

## 2023-04-13 RX ADMIN — INSULIN DETEMIR 6 UNITS: 100 INJECTION, SOLUTION SUBCUTANEOUS at 08:04

## 2023-04-13 RX ADMIN — INSULIN ASPART 1 UNITS: 100 INJECTION, SOLUTION INTRAVENOUS; SUBCUTANEOUS at 05:04

## 2023-04-13 RX ADMIN — CARVEDILOL 25 MG: 25 TABLET, FILM COATED ORAL at 07:04

## 2023-04-13 RX ADMIN — MUPIROCIN: 20 OINTMENT TOPICAL at 11:04

## 2023-04-13 RX ADMIN — FLUOXETINE 20 MG: 20 CAPSULE ORAL at 08:04

## 2023-04-13 RX ADMIN — INSULIN ASPART 2 UNITS: 100 INJECTION, SOLUTION INTRAVENOUS; SUBCUTANEOUS at 07:04

## 2023-04-13 RX ADMIN — PANTOPRAZOLE SODIUM 40 MG: 40 TABLET, DELAYED RELEASE ORAL at 08:04

## 2023-04-13 RX ADMIN — APIXABAN 5 MG: 5 TABLET, FILM COATED ORAL at 08:04

## 2023-04-13 RX ADMIN — PROMETHAZINE HYDROCHLORIDE 12.5 MG: 25 INJECTION INTRAMUSCULAR; INTRAVENOUS at 12:04

## 2023-04-13 RX ADMIN — SUCRALFATE 1 G: 1 SUSPENSION ORAL at 04:04

## 2023-04-13 RX ADMIN — SUCRALFATE 1 G: 1 SUSPENSION ORAL at 08:04

## 2023-04-13 RX ADMIN — ACETAMINOPHEN 1000 MG: 500 TABLET ORAL at 10:04

## 2023-04-13 RX ADMIN — INSULIN ASPART 4 UNITS: 100 INJECTION, SOLUTION INTRAVENOUS; SUBCUTANEOUS at 05:04

## 2023-04-13 RX ADMIN — AMITRIPTYLINE HYDROCHLORIDE 50 MG: 25 TABLET, FILM COATED ORAL at 09:04

## 2023-04-13 RX ADMIN — AMLODIPINE BESYLATE 5 MG: 5 TABLET ORAL at 08:04

## 2023-04-13 RX ADMIN — CARVEDILOL 25 MG: 25 TABLET, FILM COATED ORAL at 04:04

## 2023-04-13 RX ADMIN — LIDOCAINE 1 PATCH: 50 PATCH CUTANEOUS at 11:04

## 2023-04-13 RX ADMIN — ISOSORBIDE MONONITRATE 60 MG: 30 TABLET, EXTENDED RELEASE ORAL at 08:04

## 2023-04-13 RX ADMIN — INSULIN ASPART 4 UNITS: 100 INJECTION, SOLUTION INTRAVENOUS; SUBCUTANEOUS at 07:04

## 2023-04-13 RX ADMIN — METHOCARBAMOL 750 MG: 750 TABLET ORAL at 08:04

## 2023-04-13 NOTE — HPI
33 year old  female with type 2 diabetes mellitus, ESRD on HD (TTS), CHF, gastroparesis, sickle cell trait, seizure disorder, and hypertension admitted with concern for DKA.     Pt with history of severe abdominal pain that has been worked up in the past. EGD on 12/5/22 only revealing for gastritis. Previous colonoscopy with inadequate prep, however no etiology of abdominal pain identified. Pt with reports of gastroparesis on her chart, however no gastric emptying study noted. Pt has reportedly tolerated her meals well, and per nursing has attempted to self induce vomiting. CT scan with no acute abnormality.     GI was consulted for intractable abdominal pain in the setting of possible gastroparesis.

## 2023-04-13 NOTE — NURSING
Pt repeatedly seen inducing vomiting and gagging by sticking fingers in throat. Told she shouldn't do this, as it is not helpful and can make her feel worse. When brought up, pt denies her actions.

## 2023-04-13 NOTE — ASSESSMENT & PLAN NOTE
Pt is a 32 yo F with severe, cramping, sharp, epigastric abdominal pain that appears to be chronic in nature with no known etiology. Extensive workup in the past has been unrevealing. Pt has reportedly refused interventions previously. Unconfirmed reports of gastroparesis although patient has tolerated diet well.     Recommendations:   --Continue Pantoprazole Daily.   --Avoid opiates as this would decrease gastric motility.   --Can consider Carafate.

## 2023-04-13 NOTE — PROGRESS NOTES
[facility] - Transplant Stepdown  Nephrology  Progress Note    Patient Name: Tabby Howard  MRN: [id number]  Admission Date: 4/10/2023  Hospital Length of Stay: 2 days  Attending Provider: Deandre Irby MD   Primary Care Physician: AIDEN CONNER NP  Principal Problem:DKA (diabetic ketoacidosis)      Interval History: Attempted HD this am but unable to start treatment due to patient having severe back pain/patient unable to remain still for treatment.     Objective:     Vital Signs (Most Recent):  Temp: 97.7 °F (36.5 °C) (04/13/23 1108)  Pulse: 83 (04/13/23 1108)  Resp: 18 (04/13/23 1108)  BP: (!) 151/87 (04/13/23 1108)  SpO2: (!) 93 % (04/13/23 1108)   Vital Signs (24h Range):  Temp:  [97.7 °F (36.5 °C)-98.6 °F (37 °C)] 97.7 °F (36.5 °C)  Pulse:  [83-96] 83  Resp:  [18] 18  SpO2:  [93 %-99 %] 93 %  BP: (151-207)/() 151/87     Weight: 54 kg (119 lb) (04/12/23 0907)  Body mass index is 21.77 kg/m².  Body surface area is 1.54 meters squared.    I/O last 3 completed shifts:  In: 700 [P.O.:700]  Out: -     Physical Exam  Constitutional:       Appearance: Normal appearance.   HENT:      Head: Normocephalic and atraumatic.      Nose: Nose normal.      Mouth/Throat:      Mouth: Mucous membranes are moist.      Pharynx: Oropharynx is clear.   Eyes:      Conjunctiva/sclera: Conjunctivae normal.   Cardiovascular:      Rate and Rhythm: Normal rate and regular rhythm.      Comments: R IJ TDC   Pulmonary:      Effort: Pulmonary effort is normal.      Breath sounds: Normal breath sounds.   Abdominal:      General: Abdomen is flat.   Musculoskeletal:         General: Normal range of motion.      Cervical back: Normal range of motion and neck supple.   Skin:     General: Skin is warm and dry.   Neurological:      General: No focal deficit present.      Mental Status: She is alert and oriented to person, place, and time.   Psychiatric:         Mood and Affect: Mood normal.         Behavior: Behavior normal.        Significant Labs:  CBC:   Recent Labs   Lab 04/13/23  0716   WBC 4.55   RBC 3.10*   HGB 9.2*   HCT 28.7*   *   MCV 93   MCH 29.7   MCHC 32.1       CMP:   Recent Labs   Lab 04/13/23  0716   *   CALCIUM 9.0   ALBUMIN 3.3*   PROT 7.3   *   K 4.7   CO2 18*      BUN 38*   CREATININE 6.1*   ALKPHOS 368*   ALT 28   AST 24   BILITOT 1.1*       All labs within the past 24 hours have been reviewed.      Assessment/Plan:     Renal/  ESRD (end stage renal disease) on dialysis  Outpatient HD Information:    -Outpatient HD unit: [facility]   -HD tx days: TTS  -HD tx time: 4hrs   -Last HD tx prior to presentation: 4/8/23  -HD access: R IJ TDC   -HD modality: iHD   -Residual urine: Anuric       Plan/Recommendations:    -Plan for HD at bedside once abdominal pain is more controlled   -Renal diet  -Strict I/O's and daily weights  -Daily renal function panels   -Renally dose meds        Chronic kidney disease-mineral and bone disorder  -Low phos diet   -No indication for phos binders at this time     Oncology  Anemia of chronic kidney failure, stage 5  -Target Hg of 10-12  -Transfuse for Hg <7.0    Endocrine  * DKA (diabetic ketoacidosis)  -Management per primary team         Thank you for your consult. I will follow-up with patient. Please contact us if you have any additional questions.    Henri Montoya, NP  Nephrology  [facility] - [facility] Stepdown

## 2023-04-13 NOTE — ASSESSMENT & PLAN NOTE
Pt presented with severe abdominal pain x3 days, she states it has progressively worsened. Pt found to be in DKA, could possibly be contributing to pain. CTAP negative for bowel obstruction, significant for bladder wall thickening, pt denies any symptoms of agitated bladder, pt is anuric 2/2 ESRD. Of note pt admitted 7 times in 2023 for similar episodes of abdominal pain, per past medical records gastroparesis is suspected however not confirmed. Etiology of abdominal pain possibly DKA vs function abdominal pain vs gastroparesis. Patient trialed on multimodal pain regimen but perseverating on dilaudid use. Suspect that opioid use may worsen abdominal pain.    - trialed reglan without improvement  - started amitriptyline  - tylenol, capsaicin cream and lidocaine patch available  - zofran prn for nausea   - see DKA for remainder of plan

## 2023-04-13 NOTE — ASSESSMENT & PLAN NOTE
- Presented with Mild DKA versus stress related hyperglycemia with unclear trigger; patient states adherence to insulin therapy with the exception of the morning of admission when she missed her morning Humalog  - High-risk of hypoglycemia given ESRD; has reportedly had recurring episodes of hypoglycemia home  - Improvement since yesterday morning but with some up trending glucose overnight    Plan:   - Increase Levemir to 7 units BID (given rise overnight)  - Continue Novolog to 4 units TIDAC  - Continue low dose sliding scale  - Hypoglycemia protocol PRN

## 2023-04-13 NOTE — SUBJECTIVE & OBJECTIVE
Past Medical History:   Diagnosis Date    CKD (chronic kidney disease), stage IV 2022    Diabetes mellitus due to underlying condition with unspecified complications 2022    Gastroparesis 2022    Heart failure with preserved ejection fraction 2022    EF 55% on 3/22    History of gastroesophageal reflux (GERD)     History of supraventricular tachycardia     Hyperkalemia 2022    Hypertensive emergency 2022    Sickle cell trait 2022    Type 2 diabetes mellitus        Past Surgical History:   Procedure Laterality Date     SECTION      x 3    COLONOSCOPY      COLONOSCOPY N/A 2022    Procedure: COLONOSCOPY;  Surgeon: Jagdeep Cedeno MD;  Location: [facility];  Service: Endoscopy;  Laterality: N/A;    ESOPHAGOGASTRODUODENOSCOPY N/A 10/18/2019    Procedure: ESOPHAGOGASTRODUODENOSCOPY (EGD);  Surgeon: Gianluca Mendez MD;  Location: [facility];  Service: Endoscopy;  Laterality: N/A;    ESOPHAGOGASTRODUODENOSCOPY N/A 2022    Procedure: EGD (ESOPHAGOGASTRODUODENOSCOPY);  Surgeon: Micky Paredes III, MD;  Location: [facility];  Service: Endoscopy;  Laterality: N/A;    ESOPHAGOGASTRODUODENOSCOPY N/A 2022    Procedure: EGD (ESOPHAGOGASTRODUODENOSCOPY);  Surgeon: Marcelo Zhong MD;  Location: [facility];  Service: Endoscopy;  Laterality: N/A;    LAPAROSCOPIC CHOLECYSTECTOMY N/A 2022    Procedure: CHOLECYSTECTOMY, LAPAROSCOPIC;  Surgeon: Grey Perez MD;  Location: [facility];  Service: General;  Laterality: N/A;    PLACEMENT OF DUAL-LUMEN VASCULAR CATHETER Left 2022    Procedure: INSERTION-CATHETER-JOSEPH;  Surgeon: Dionte Gan MD;  Location: [facility] OR;  Service: General;  Laterality: Left;    PLACEMENT OF DUAL-LUMEN VASCULAR CATHETER Right 2022    Procedure: INSERTION-CATHETER-Hemosplit;  Surgeon: Dionte Gan MD;  Location: [facility] OR;  Service: General;  Laterality: Right;       Review of patient's allergies indicates:   Allergen Reactions     Penicillins Hives     Family History       Problem Relation (Age of Onset)    Diabetes Mother, Father          Tobacco Use    Smoking status: Never    Smokeless tobacco: Never   Substance and Sexual Activity    Alcohol use: Not Currently    Drug use: No    Sexual activity: Not Currently     Partners: Male     Birth control/protection: I.U.D.     Review of Systems   Constitutional:  Positive for appetite change. Negative for activity change, chills and fever.   HENT:  Negative for congestion, rhinorrhea, sinus pressure, sinus pain, sneezing and sore throat.    Eyes:  Positive for visual disturbance.   Respiratory:  Negative for apnea, cough, choking, chest tightness and shortness of breath.    Cardiovascular:  Negative for chest pain, palpitations and leg swelling.   Gastrointestinal:  Positive for abdominal pain, nausea and vomiting. Negative for abdominal distention, constipation and diarrhea.   Endocrine: Negative for polyphagia and polyuria.   Genitourinary:  Negative for dysuria, flank pain and urgency.   Musculoskeletal:  Positive for back pain. Negative for arthralgias, joint swelling, myalgias, neck pain and neck stiffness.   Neurological:  Negative for dizziness, weakness, light-headedness and headaches.   Psychiatric/Behavioral:  Negative for agitation. The patient is nervous/anxious.    Objective:     Vital Signs (Most Recent):  Temp: 97.7 °F (36.5 °C) (04/13/23 0704)  Pulse: 95 (04/13/23 0704)  Resp: 18 (04/13/23 0704)  BP: (!) 185/101 (04/13/23 0822)  SpO2: 96 % (04/13/23 0704)   Vital Signs (24h Range):  Temp:  [97.7 °F (36.5 °C)-98.6 °F (37 °C)] 97.7 °F (36.5 °C)  Pulse:  [90-96] 95  Resp:  [18] 18  SpO2:  [95 %-99 %] 96 %  BP: (133-207)/() 185/101     Weight: 54 kg (119 lb) (04/12/23 0907)  Body mass index is 21.77 kg/m².      Intake/Output Summary (Last 24 hours) at 4/13/2023 1100  Last data filed at 4/13/2023 0881  Gross per 24 hour   Intake 460 ml   Output --   Net 460 ml        Lines/Drains/Airways       Central Venous Catheter Line  Duration                  Hemodialysis Catheter 12/09/22 right subclavian 125 days              Peripheral Intravenous Line  Duration                  Peripheral IV - Single Lumen 04/10/23 1051 18 G Right Antecubital 3 days                    Physical Exam  Constitutional:       General: She is in acute distress.      Appearance: She is ill-appearing. She is not diaphoretic.   HENT:      Head: Normocephalic and atraumatic.      Mouth/Throat:      Mouth: Mucous membranes are moist.      Pharynx: Oropharynx is clear.   Eyes:      Conjunctiva/sclera: Conjunctivae normal.   Cardiovascular:      Rate and Rhythm: Tachycardia present.      Pulses: Normal pulses.      Heart sounds: Normal heart sounds.   Pulmonary:      Effort: Pulmonary effort is normal. No respiratory distress.      Breath sounds: Normal breath sounds. No stridor. No wheezing or rhonchi.   Abdominal:      General: Abdomen is flat. There is no distension.      Palpations: Abdomen is soft.      Tenderness: There is abdominal tenderness. There is no guarding or rebound.      Comments: Hypoactive bowel sounds, diffuse tenderness to palpation   Musculoskeletal:         General: Normal range of motion.      Cervical back: Normal range of motion and neck supple.      Right lower leg: No edema.      Left lower leg: No edema.   Skin:     General: Skin is warm and dry.   Neurological:      General: No focal deficit present.      Mental Status: She is oriented to person, place, and time.   Psychiatric:         Attention and Perception: Attention normal.         Mood and Affect: Mood is anxious. Affect is tearful.         Speech: Speech normal.         Behavior: Behavior is cooperative.       Significant Labs:  All pertinent lab results from the last 24 hours have been reviewed.    Significant Imaging:  Imaging results within the past 24 hours have been reviewed.  CT A/P:   Impression:     1. No CT  findings identified to explain patient's symptoms of nausea/vomiting.  Specifically, no convincing evidence of bowel obstruction or acute inflammation.  2. Urinary bladder moderate degree of circumferential wall thickening, increased from 04/08/2023 study, concerning for worsening nonspecific cystitis.  Clinical correlation and with urinalysis advised.  3. Hepatosplenomegaly.  4. Cholecystectomy.  5. Similar cardiomegaly with nonspecific moderate pericardial fluid.  6. Grossly similar suspected fluid volume overload including mesenteric edema, small volume abdominopelvic ascites and body wall edema/anasarca.  7. Grossly stable additional findings as above.

## 2023-04-13 NOTE — PLAN OF CARE
"Pt AAOx4. C/o 10/10 pain to ABD and back. Initially refused all PRN medications for pain, requesting Dilaudid. Eventually accepted available PRN methods including tylenol, lidocaine patch, Robaxin. Pt c/o nausea and vomited small amount of clear liquid onto floor x2. Emesis bag provided. In contact with Dr. Bhardwaj throughout the night. Dr. Bhardwaj at bedside after multiple attempts to calm pt. Pt became agitated, rolling around in bed loudly exressing that "no one is helping her." Compazine and zofran administered. IV Phenergan administered. During rounds, pt was heard gagging and was found leaning over the edge of bed with fingers in throat, purposefully making herself gag/ throw up.  When asked what she was doing she responded that she was in pain. VSS. Accu checks ACHS. Bed locked and low. Call bell in reach. Educated to call for assist if needed.    "

## 2023-04-13 NOTE — ASSESSMENT & PLAN NOTE
Pt will need outpatient gastric emptying study, preferably when off opiates to confirm diagnosis of gastroparesis. She reports tolerating diet well. If she has n/v, can consider liquid diet.     Recommendations:   --Outpatient gastric emptying study   --Diet as tolerated.

## 2023-04-13 NOTE — SUBJECTIVE & OBJECTIVE
"Interval HPI:   Overnight events: Improvement in glucose levels seen but still some up-trending overnight despite Levemir.  Continue to complain of pain and request opiates, HD cancelled today due to her pain.  No other new overnight events.      BP (!) 185/101 (BP Location: Left arm, Patient Position: Lying)   Pulse 95   Temp 97.7 °F (36.5 °C) (Oral)   Resp 18   Ht 5' 2" (1.575 m)   Wt 54 kg (119 lb)   SpO2 96%   Breastfeeding No   BMI 21.77 kg/m²     Labs Reviewed and Include    Recent Labs   Lab 04/13/23  0716   *   CALCIUM 9.0   ALBUMIN 3.3*   PROT 7.3   *   K 4.7   CO2 18*      BUN 38*   CREATININE 6.1*   ALKPHOS 368*   ALT 28   AST 24   BILITOT 1.1*     Lab Results   Component Value Date    WBC 4.55 04/13/2023    HGB 9.2 (L) 04/13/2023    HCT 28.7 (L) 04/13/2023    MCV 93 04/13/2023     (L) 04/13/2023     No results for input(s): TSH, FREET4 in the last 168 hours.  Lab Results   Component Value Date    HGBA1C 5.8 03/03/2023       Nutritional status:   Body mass index is 21.77 kg/m².  Lab Results   Component Value Date    ALBUMIN 3.3 (L) 04/13/2023    ALBUMIN 3.5 04/10/2023    ALBUMIN 3.3 (L) 04/08/2023     No results found for: PREALBUMIN    Estimated Creatinine Clearance: 10.3 mL/min (A) (based on SCr of 6.1 mg/dL (H)).    Accu-Checks  Recent Labs     04/11/23  1715 04/11/23  2129 04/12/23  0854 04/12/23  0952 04/12/23  1033 04/12/23  1133 04/12/23  1214 04/12/23  1631 04/12/23  2113 04/13/23  0717   POCTGLUCOSE 157* 296* >500* >500* >500* 416* 320* 294* 164* 209*       Current Medications and/or Treatments Impacting Glycemic Control  Immunotherapy:    Immunosuppressants       None          Steroids:   Hormones (From admission, onward)      None          Pressors:    Autonomic Drugs (From admission, onward)      None          Hyperglycemia/Diabetes Medications:   Antihyperglycemics (From admission, onward)      Start     Stop Route Frequency Ordered    04/13/23 2100  insulin " detemir U-100 (Levemir) pen 7 Units         -- SubQ 2 times daily 04/13/23 1045    04/12/23 1645  insulin aspart U-100 pen 4 Units         -- SubQ 3 times daily with meals 04/12/23 1321    04/12/23 1418  insulin aspart U-100 pen 0-5 Units         -- SubQ Before meals & nightly PRN 04/12/23 1320

## 2023-04-13 NOTE — ASSESSMENT & PLAN NOTE
Outpatient HD Information:    -Outpatient HD unit: [facility]   -HD tx days: TTS  -HD tx time: 4hrs   -Last HD tx prior to presentation: 4/8/23  -HD access: R IJ TDC   -HD modality: iHD   -Residual urine: Anuric       Plan/Recommendations:    -Plan for HD at bedside once abdominal pain is more controlled   -Renal diet  -Strict I/O's and daily weights  -Daily renal function panels   -Renally dose meds

## 2023-04-13 NOTE — CONSULTS
[facility]  Gastroenterology  Consult Note    Patient Name: Tabby Howard  MRN: [id number]  Admission Date: 4/10/2023  Hospital Length of Stay: 2 days  Code Status: Full Code   Attending Provider: Deandre Irby MD   Consulting Provider: Viky Chester MD  Primary Care Physician: AIDEN CONNER NP  Principal Problem:DKA (diabetic ketoacidosis)    Inpatient consult to Gastroenterology  Consult performed by: Viky Chester MD  Consult ordered by: Deandre Irby MD  Reason for consult: Intractable abdominal pain in the setting of possible gastroparesis         Subjective:     HPI:  33 year old  female with type 2 diabetes mellitus, ESRD on HD (TTS), CHF, gastroparesis, sickle cell trait, seizure disorder, and hypertension admitted with concern for DKA.     Pt with history of severe abdominal pain that has been worked up in the past. EGD on 22 only revealing for gastritis. Previous colonoscopy with inadequate prep, however no etiology of abdominal pain identified. Pt with reports of gastroparesis on her chart, however no gastric emptying study noted. Pt has reportedly tolerated her meals well, and per nursing has attempted to self induce vomiting. CT scan with no acute abnormality.     GI was consulted for intractable abdominal pain in the setting of possible gastroparesis.       Past Medical History:   Diagnosis Date    CKD (chronic kidney disease), stage IV 2022    Diabetes mellitus due to underlying condition with unspecified complications 2022    Gastroparesis 2022    Heart failure with preserved ejection fraction 2022    EF 55% on 3/22    History of gastroesophageal reflux (GERD)     History of supraventricular tachycardia     Hyperkalemia 2022    Hypertensive emergency 2022    Sickle cell trait 2022    Type 2 diabetes mellitus        Past Surgical History:   Procedure Laterality Date     SECTION      x 3     COLONOSCOPY      COLONOSCOPY N/A 8/25/2022    Procedure: COLONOSCOPY;  Surgeon: Jagdeep Cedeno MD;  Location: [facility];  Service: Endoscopy;  Laterality: N/A;    ESOPHAGOGASTRODUODENOSCOPY N/A 10/18/2019    Procedure: ESOPHAGOGASTRODUODENOSCOPY (EGD);  Surgeon: Gianluca Mendez MD;  Location: [facility];  Service: Endoscopy;  Laterality: N/A;    ESOPHAGOGASTRODUODENOSCOPY N/A 08/24/2022    Procedure: EGD (ESOPHAGOGASTRODUODENOSCOPY);  Surgeon: Micky Paredes III, MD;  Location: [facility];  Service: Endoscopy;  Laterality: N/A;    ESOPHAGOGASTRODUODENOSCOPY N/A 12/5/2022    Procedure: EGD (ESOPHAGOGASTRODUODENOSCOPY);  Surgeon: Marcelo Zhong MD;  Location: [facility];  Service: Endoscopy;  Laterality: N/A;    LAPAROSCOPIC CHOLECYSTECTOMY N/A 07/30/2022    Procedure: CHOLECYSTECTOMY, LAPAROSCOPIC;  Surgeon: Grey Perez MD;  Location: [facility] OR;  Service: General;  Laterality: N/A;    PLACEMENT OF DUAL-LUMEN VASCULAR CATHETER Left 07/12/2022    Procedure: INSERTION-CATHETER-JOSEPH;  Surgeon: Dionte Gan MD;  Location: [facility] OR;  Service: General;  Laterality: Left;    PLACEMENT OF DUAL-LUMEN VASCULAR CATHETER Right 07/26/2022    Procedure: INSERTION-CATHETER-Hemosplit;  Surgeon: Dionte Gan MD;  Location: [facility] OR;  Service: General;  Laterality: Right;       Review of patient's allergies indicates:   Allergen Reactions    Penicillins Hives     Family History       Problem Relation (Age of Onset)    Diabetes Mother, Father          Tobacco Use    Smoking status: Never    Smokeless tobacco: Never   Substance and Sexual Activity    Alcohol use: Not Currently    Drug use: No    Sexual activity: Not Currently     Partners: Male     Birth control/protection: I.U.D.     Review of Systems   Constitutional:  Positive for appetite change. Negative for activity change, chills and fever.   HENT:  Negative for congestion, rhinorrhea, sinus pressure, sinus pain, sneezing and sore throat.    Eyes:   Positive for visual disturbance.   Respiratory:  Negative for apnea, cough, choking, chest tightness and shortness of breath.    Cardiovascular:  Negative for chest pain, palpitations and leg swelling.   Gastrointestinal:  Positive for abdominal pain, nausea and vomiting. Negative for abdominal distention, constipation and diarrhea.   Endocrine: Negative for polyphagia and polyuria.   Genitourinary:  Negative for dysuria, flank pain and urgency.   Musculoskeletal:  Positive for back pain. Negative for arthralgias, joint swelling, myalgias, neck pain and neck stiffness.   Neurological:  Negative for dizziness, weakness, light-headedness and headaches.   Psychiatric/Behavioral:  Negative for agitation. The patient is nervous/anxious.    Objective:     Vital Signs (Most Recent):  Temp: 97.7 °F (36.5 °C) (04/13/23 0704)  Pulse: 95 (04/13/23 0704)  Resp: 18 (04/13/23 0704)  BP: (!) 185/101 (04/13/23 0822)  SpO2: 96 % (04/13/23 0704)   Vital Signs (24h Range):  Temp:  [97.7 °F (36.5 °C)-98.6 °F (37 °C)] 97.7 °F (36.5 °C)  Pulse:  [90-96] 95  Resp:  [18] 18  SpO2:  [95 %-99 %] 96 %  BP: (133-207)/() 185/101     Weight: 54 kg (119 lb) (04/12/23 0907)  Body mass index is 21.77 kg/m².      Intake/Output Summary (Last 24 hours) at 4/13/2023 1105  Last data filed at 4/13/2023 0852  Gross per 24 hour   Intake 460 ml   Output --   Net 460 ml       Lines/Drains/Airways       Central Venous Catheter Line  Duration                  Hemodialysis Catheter 12/09/22 right subclavian 125 days              Peripheral Intravenous Line  Duration                  Peripheral IV - Single Lumen 04/10/23 1051 18 G Right Antecubital 3 days                    Physical Exam  Constitutional:       General: She is in acute distress.      Appearance: She is ill-appearing. She is not diaphoretic.   HENT:      Head: Normocephalic and atraumatic.      Mouth/Throat:      Mouth: Mucous membranes are moist.      Pharynx: Oropharynx is clear.   Eyes:       Conjunctiva/sclera: Conjunctivae normal.   Cardiovascular:      Rate and Rhythm: Tachycardia present.      Pulses: Normal pulses.      Heart sounds: Normal heart sounds.   Pulmonary:      Effort: Pulmonary effort is normal. No respiratory distress.      Breath sounds: Normal breath sounds. No stridor. No wheezing or rhonchi.   Abdominal:      General: Abdomen is flat. There is no distension.      Palpations: Abdomen is soft.      Tenderness: There is abdominal tenderness. There is no guarding or rebound.      Comments: Hypoactive bowel sounds, diffuse tenderness to palpation   Musculoskeletal:         General: Normal range of motion.      Cervical back: Normal range of motion and neck supple.      Right lower leg: No edema.      Left lower leg: No edema.   Skin:     General: Skin is warm and dry.   Neurological:      General: No focal deficit present.      Mental Status: She is oriented to person, place, and time.   Psychiatric:         Attention and Perception: Attention normal.         Mood and Affect: Mood is anxious. Affect is tearful.         Speech: Speech normal.         Behavior: Behavior is cooperative.       Significant Labs:  All pertinent lab results from the last 24 hours have been reviewed.    Significant Imaging:  Imaging results within the past 24 hours have been reviewed.  CT A/P:   Impression:     1. No CT findings identified to explain patient's symptoms of nausea/vomiting.  Specifically, no convincing evidence of bowel obstruction or acute inflammation.  2. Urinary bladder moderate degree of circumferential wall thickening, increased from 04/08/2023 study, concerning for worsening nonspecific cystitis.  Clinical correlation and with urinalysis advised.  3. Hepatosplenomegaly.  4. Cholecystectomy.  5. Similar cardiomegaly with nonspecific moderate pericardial fluid.  6. Grossly similar suspected fluid volume overload including mesenteric edema, small volume abdominopelvic ascites and body  wall edema/anasarca.  7. Grossly stable additional findings as above.    Assessment/Plan:     GI  Abdominal pain  Pt is a 32 yo F with severe, cramping, sharp, epigastric abdominal pain that appears to be chronic in nature with no known etiology. Extensive workup in the past has been unrevealing. Pt has reportedly refused interventions previously. Unconfirmed reports of gastroparesis although patient has tolerated diet well.     Recommendations:   --Continue Pantoprazole Daily.   --Avoid opiates as this would decrease gastric motility.   --Can consider Carafate.     Gastroparesis - suspected, unconfirmed  Pt will need outpatient gastric emptying study, preferably when off opiates to confirm diagnosis of gastroparesis. She reports tolerating diet well. If she has n/v, can consider liquid diet.     Recommendations:   --Outpatient gastric emptying study   --Diet as tolerated.         Thank you for your consult. I will sign off. Please contact us if you have any additional questions.    Viky Chester MD  Gastroenterology  [facility] - [facility]

## 2023-04-13 NOTE — PROGRESS NOTES
AAOx4. VSS. Patient up ad pratima - ambulates in room independently. Patient is anuric - on HD (T, Th, Sat) - patient scheduled for HD today, but was sent back d/t uncontrollable pain and inability to sit still for HD - Dr. Longoria notified. Lidocaine patch applied to patient's abdomen. PRN robaxin given x1 this shift. GI consulted 4/13 for abd pain - patient to start sucralfate this evening. GI also recommending patient to complete outpatient gastric emptying study. BS more controlled today. BS at lunch 108 - insulin not given d/t patient stating she is not going to eat lunch. Bed in low position. Call light within reach.

## 2023-04-13 NOTE — PROGRESS NOTES
Patient awake, alert and responsive.  Patient crying and complaining of back pain.  Constantly squirming in bed.  Requested for her pain medication from primary nurse.  Advised patient that  she has to be still while HD TX and PRN medication will be given.  Notified Nephrology team.

## 2023-04-13 NOTE — ASSESSMENT & PLAN NOTE
Pt with ESRD on dialysis T/Th/S. Pt reports having not missed a dialysis session in the past week. Pt does not make urine     -nephrology consulted for dialysis, will appreciate recs  -HD completed on 4/11, unable to complete HD 4/13 due to abdominal pain

## 2023-04-13 NOTE — PROGRESS NOTES
[facility] - Transplant [facility] Medicine  Progress Note    Patient Name: Tabby Howard  MRN: [id number]  Patient Class: IP- Inpatient   Admission Date: 4/10/2023  Length of Stay: 2 days  Attending Physician: Deandre Irby MD  Primary Care Provider: AIDEN CONNER NP        Subjective:     Principal Problem:DKA (diabetic ketoacidosis)        HPI:  Pt is a 33 y.o. female PMH of ESRD on HD T/Th/S, T2DM on insulin, HFpEF, HTN, unknown seizure disorder, DVT of LUE on apixiban, who [place] [facility] [place] prompting from oupt endocrinology appointment when pt was discovered to have glucose >500 accompanied by N/V and abdominal pain. Pt reports abdominal pain began 3 days ago and has progressively worsened. She also reports chest pain and low central back pain that also began 3 days ago. With regards to chest pain patient explains it feels like pressure and has been consistent since onset, also endorses orthopnea. Pt is unable to described abdominal pain due to being in significant pain at time of exam. Of note pt was recently admitted for abdominal pain, was discharged from this admission on 4/8, she states that this episode of pain is different from her prior admission. Pt denies missing any dialysis sessions, reports no changes in her diet prior to onset of abdominal pain or N/V. States that her glucose has been in the 200s for the past 3 days concurrently with the abdominal pain, and that she has not missed any doses of home insulin.     While in ED CTAP was obtained, negative for bowel obstruction, significant for bladder wall thickening concerning for cystitis. BNP elevated to 2,187, CXR with marked cardiomegaly, but there has been no significant detrimental interval change, no pleural effusion. Initial trop elevated to 0.042, now down trending. Glucose elevated to 509, B hydroxybutarate elevated, anion gap elevated. Pt admitted to hospital medicine with DKA, endocrine consult by ED, recommended  insulin gtt and K containing IVF.       Overview/Hospital Course:  Patient admitted to hospital medicine for DKA. She was started on insulin infusion and noted to have labile blood glucose. She was subsequently transitioned to subcutaneous insulin with stabilization of blood glucose. During hospitalization, patient with severe abdominal pain. CT of abdomen without acute abnormalities to explain abdominal pain. Given nature and history, suspect likely gastroparesis. Initiated multimodal pain regimen but patient noncompliant and requesting IV dilaudid. Discussed in depth with patient that opioids would likely worsen pain. GI consulted to evaluate for concern of gastroparesis and refractory pain.      Interval History: Patient describes persistent 10/10 abdominal pain but refusing medications available to her and vitals. She was unable to complete HD this AM. GI consulted for assistance in management of abdominal pain. Blood glucose stable.    Review of Systems   Constitutional:  Positive for appetite change. Negative for activity change, chills and fever.   HENT:  Negative for congestion, rhinorrhea, sinus pressure, sinus pain, sneezing and sore throat.    Eyes:  Positive for visual disturbance.   Respiratory:  Positive for chest tightness. Negative for apnea, cough, choking and shortness of breath.    Cardiovascular:  Positive for chest pain. Negative for palpitations and leg swelling.   Gastrointestinal:  Positive for abdominal pain, nausea and vomiting. Negative for abdominal distention, constipation and diarrhea.   Endocrine: Negative for polyphagia and polyuria.   Genitourinary:  Negative for dysuria, flank pain and urgency.   Musculoskeletal:  Positive for back pain. Negative for arthralgias, joint swelling, myalgias, neck pain and neck stiffness.   Neurological:  Negative for dizziness, weakness, light-headedness and headaches.   Psychiatric/Behavioral:  Negative for agitation. The patient is nervous/anxious.     Objective:     Vital Signs (Most Recent):  Temp: 97.7 °F (36.5 °C) (04/13/23 1108)  Pulse: 83 (04/13/23 1108)  Resp: 18 (04/13/23 1108)  BP: (!) 151/87 (04/13/23 1108)  SpO2: (!) 93 % (04/13/23 1108) Vital Signs (24h Range):  Temp:  [97.7 °F (36.5 °C)-98.6 °F (37 °C)] 97.7 °F (36.5 °C)  Pulse:  [83-96] 83  Resp:  [18] 18  SpO2:  [93 %-99 %] 93 %  BP: (133-207)/() 151/87     Weight: 54 kg (119 lb)  Body mass index is 21.77 kg/m².    Intake/Output Summary (Last 24 hours) at 4/13/2023 1219  Last data filed at 4/13/2023 0852  Gross per 24 hour   Intake 460 ml   Output --   Net 460 ml        Physical Exam  Constitutional:       General: She is in acute distress.      Appearance: She is ill-appearing. She is not diaphoretic.   HENT:      Head: Normocephalic and atraumatic.      Mouth/Throat:      Mouth: Mucous membranes are moist.      Pharynx: Oropharynx is clear.   Eyes:      Conjunctiva/sclera: Conjunctivae normal.   Cardiovascular:      Rate and Rhythm: Tachycardia present.      Pulses: Normal pulses.      Heart sounds: Normal heart sounds.   Pulmonary:      Effort: Pulmonary effort is normal. No respiratory distress.      Breath sounds: Normal breath sounds. No stridor. No wheezing or rhonchi.   Abdominal:      General: Abdomen is flat. There is no distension.      Palpations: Abdomen is soft.      Tenderness: There is abdominal tenderness. There is no guarding or rebound.      Comments: Hypoactive bowel sounds, diffuse tenderness to palpation   Musculoskeletal:         General: Normal range of motion.      Cervical back: Normal range of motion and neck supple.      Right lower leg: No edema.      Left lower leg: No edema.   Skin:     General: Skin is warm and dry.   Neurological:      General: No focal deficit present.      Mental Status: She is oriented to person, place, and time.       Significant Labs: All pertinent labs within the past 24 hours have been reviewed.    Significant Imaging: I have reviewed  all pertinent imaging results/findings within the past 24 hours.      Assessment/Plan:      * DKA (diabetic ketoacidosis)  Patient's FSGs are uncontrolled due to hyperglycemia on current medication regimen. B hydroxybutarate elevated to 0.9. Anion gap of 17. Initial glucose of 509   Last A1c reviewed-   Lab Results   Component Value Date    HGBA1C 5.8 03/03/2023     Most recent fingerstick glucose reviewed-   Recent Labs   Lab 04/12/23  1631 04/12/23  2113 04/13/23  0717   POCTGLUCOSE 294* 164* 209*     Current correctional scale  Low  Maintain anti-hyperglycemic dose as follows-   Antihyperglycemics (From admission, onward)    Start     Stop Route Frequency Ordered    04/13/23 2100  insulin detemir U-100 (Levemir) pen 7 Units         -- SubQ 2 times daily 04/13/23 1045    04/12/23 1645  insulin aspart U-100 pen 4 Units         -- SubQ 3 times daily with meals 04/12/23 1321    04/12/23 1418  insulin aspart U-100 pen 0-5 Units         -- SubQ Before meals & nightly PRN 04/12/23 1320        Hold Oral hypoglycemics while patient is in the hospital.  -Endocrinology consulted, will appreciate recs      Mood disorder  Cont home fluoxetine   Started amitriptyline on admission      Chronic deep vein thrombosis (DVT) of left upper extremity  Cont home apixiban       Anemia in ESRD (end-stage renal disease)  Pt with known history of anemia in setting of ESRD. Hgb of 8.4 at time of exam     -trend daily CBC   -transfuse if Hbg <7      Congestive heart failure with left ventricular diastolic dysfunction  Patient is identified as having Diastolic (HFpEF) heart failure that is Acute on chronic. CHF is currently uncontrolled due to >3 pillow orthopnea. Latest ECHO performed and demonstrates- Results for orders placed during the hospital encounter of 01/12/23    Echo    Interpretation Summary  · The left ventricle is normal in size with moderate concentric hypertrophy and normal systolic function.  · The estimated ejection fraction  is 55%.  · Grade III left ventricular diastolic dysfunction.  · Normal right ventricular size with normal right ventricular systolic function.  · Moderate left atrial enlargement.  · Moderate to severe tricuspid regurgitation.  · Mild to moderate pulmonic regurgitation.  · Mild mitral regurgitation.  · Normal central venous pressure (3 mmHg).  · The estimated PA systolic pressure is 56 mmHg.  · There is pulmonary hypertension.  · Moderate to large circumferential pericardial effusion. No evidence of tamponade.  . Continue Beta Blocker and monitor clinical status closely. Monitor on telemetry. Patient is off CHF pathway.  Monitor strict Is&Os and daily weights.  Place on fluid restriction of 1.5 L. Continue to stress to patient importance of self efficacy and  on diet for CHF. Last BNP reviewed- and noted below   Recent Labs   Lab 04/10/23  1031   BNP 2,187*   .      Type 2 diabetes mellitus with hyperglycemia, with long-term current use of insulin  See DKA    Abdominal pain  Pt presented with severe abdominal pain x3 days, she states it has progressively worsened. Pt found to be in DKA, could possibly be contributing to pain. CTAP negative for bowel obstruction, significant for bladder wall thickening, pt denies any symptoms of agitated bladder, pt is anuric 2/2 ESRD. Of note pt admitted 7 times in 2023 for similar episodes of abdominal pain, per past medical records gastroparesis is suspected however not confirmed. Etiology of abdominal pain possibly DKA vs function abdominal pain vs gastroparesis. Patient trialed on multimodal pain regimen but perseverating on dilaudid use. Suspect that opioid use may worsen abdominal pain.    - trialed reglan without improvement  - started amitriptyline  - tylenol, capsaicin cream and lidocaine patch available  - zofran prn for nausea   - see DKA for remainder of plan      Anemia of chronic kidney failure, stage 5        Uncontrolled hypertension  Pt with hx of HTN, home  regimen including norvasc 10mg, coreg 25mg, clonidine patch, hydralazine 100mg, imdur 60mg. /111 at time of initial exam, considerable pain likely contributing to elevation     -coreg 25mg daily  -amlodipine 5mg daily  -hydralazine 100mg q12h  -imdur 60mg daily     ESRD (end stage renal disease) on dialysis  Pt with ESRD on dialysis T/Th/S. Pt reports having not missed a dialysis session in the past week. Pt does not make urine     -nephrology consulted for dialysis, will appreciate recs  -HD completed on 4/11, unable to complete HD 4/13 due to abdominal pain    Seizure disorder  Cont home keppra      Gastroparesis - suspected, unconfirmed  See abdominal pain        VTE Risk Mitigation (From admission, onward)         Ordered     heparin (porcine) injection 1,000 Units  As needed (PRN)         04/11/23 1115     apixaban tablet 5 mg  2 times daily         04/10/23 1518                Discharge Planning   TATIANA: 4/14/2023     Code Status: Full Code   Is the patient medically ready for discharge?:     Reason for patient still in hospital (select all that apply): Patient trending condition  Discharge Plan A:  (TBD)                  Yao Horner MD  Department of Hospital Medicine   [facility] - Transplant Stepdown

## 2023-04-13 NOTE — SUBJECTIVE & OBJECTIVE
Interval History: Patient describes persistent 10/10 abdominal pain but refusing medications available to her and vitals. She was unable to complete HD this AM. GI consulted for assistance in management of abdominal pain. Blood glucose stable.    Review of Systems   Constitutional:  Positive for appetite change. Negative for activity change, chills and fever.   HENT:  Negative for congestion, rhinorrhea, sinus pressure, sinus pain, sneezing and sore throat.    Eyes:  Positive for visual disturbance.   Respiratory:  Positive for chest tightness. Negative for apnea, cough, choking and shortness of breath.    Cardiovascular:  Positive for chest pain. Negative for palpitations and leg swelling.   Gastrointestinal:  Positive for abdominal pain, nausea and vomiting. Negative for abdominal distention, constipation and diarrhea.   Endocrine: Negative for polyphagia and polyuria.   Genitourinary:  Negative for dysuria, flank pain and urgency.   Musculoskeletal:  Positive for back pain. Negative for arthralgias, joint swelling, myalgias, neck pain and neck stiffness.   Neurological:  Negative for dizziness, weakness, light-headedness and headaches.   Psychiatric/Behavioral:  Negative for agitation. The patient is nervous/anxious.    Objective:     Vital Signs (Most Recent):  Temp: 97.7 °F (36.5 °C) (04/13/23 1108)  Pulse: 83 (04/13/23 1108)  Resp: 18 (04/13/23 1108)  BP: (!) 151/87 (04/13/23 1108)  SpO2: (!) 93 % (04/13/23 1108) Vital Signs (24h Range):  Temp:  [97.7 °F (36.5 °C)-98.6 °F (37 °C)] 97.7 °F (36.5 °C)  Pulse:  [83-96] 83  Resp:  [18] 18  SpO2:  [93 %-99 %] 93 %  BP: (133-207)/() 151/87     Weight: 54 kg (119 lb)  Body mass index is 21.77 kg/m².    Intake/Output Summary (Last 24 hours) at 4/13/2023 1219  Last data filed at 4/13/2023 0852  Gross per 24 hour   Intake 460 ml   Output --   Net 460 ml        Physical Exam  Constitutional:       General: She is in acute distress.      Appearance: She is  ill-appearing. She is not diaphoretic.   HENT:      Head: Normocephalic and atraumatic.      Mouth/Throat:      Mouth: Mucous membranes are moist.      Pharynx: Oropharynx is clear.   Eyes:      Conjunctiva/sclera: Conjunctivae normal.   Cardiovascular:      Rate and Rhythm: Tachycardia present.      Pulses: Normal pulses.      Heart sounds: Normal heart sounds.   Pulmonary:      Effort: Pulmonary effort is normal. No respiratory distress.      Breath sounds: Normal breath sounds. No stridor. No wheezing or rhonchi.   Abdominal:      General: Abdomen is flat. There is no distension.      Palpations: Abdomen is soft.      Tenderness: There is abdominal tenderness. There is no guarding or rebound.      Comments: Hypoactive bowel sounds, diffuse tenderness to palpation   Musculoskeletal:         General: Normal range of motion.      Cervical back: Normal range of motion and neck supple.      Right lower leg: No edema.      Left lower leg: No edema.   Skin:     General: Skin is warm and dry.   Neurological:      General: No focal deficit present.      Mental Status: She is oriented to person, place, and time.       Significant Labs: All pertinent labs within the past 24 hours have been reviewed.    Significant Imaging: I have reviewed all pertinent imaging results/findings within the past 24 hours.

## 2023-04-13 NOTE — PROGRESS NOTES
[facility] - Transplant Stepdown  Endocrinology  Progress Note    Admit Date: 4/10/2023     33 year old  female with type 2 diabetes mellitus, ESRD on HD (TTS), CHF, gastroparesis, sickle cell trait, seizure disorder, and hypertension.     - Patient sent to ED from endocrine clinic for severe hyperglycemia and concern for DKA    - Endocrinology consulted for management of severe hyperglycemia and type 2 diabetes mellitus    Regarding Type 2 diabetes mellitus:    Lab Results   Component Value Date    CO2 21 (L) 04/10/2023    CO2 25 2023    CO2 25 2023    CO2 26 2023    ANIONGAP 17 (H) 04/10/2023    ANIONGAP 15 2023    ANIONGAP 17 (H) 2023    ANIONGAP 6 (L) 2023    K 4.4 04/10/2023    K 3.1 (L) 2023    K 3.8 2023    EGFRNORACEVR 8.7 (A) 04/10/2023    EGFRNORACEVR 19.5 (A) 2023    BHYDRXBUT 0.9 (H) 04/10/2023    BHYDRXBUT 0.1 2023    HGBA1C 5.8 2023    HGBA1C 7.5 (H) 2022    HGBA1C 6.2 2022      Lab Results   Component Value Date    LIPASE 21 04/10/2023     Lab Results   Component Value Date     04/10/2023     2023     2023     (HH) 04/10/2023     (H) 2023     (H) 2023      - Corrected sodium calculator = 144 mEq/L    - Initially diagnosed with diabetes mellitus:  with gestational diabetes  - Home diabetic medications include: Patient unsure as her mother handles her insulin injections but she thinks she is on Lantus 18 units daily and NovoLog 8 units TIDWM  - Other medications tried: NA  - Weight based dosin kg x 0.3 (ESRD) = 16 TDD x 0.5 = 8 basal / 8 prandial  - 1700/TDD = 106 (estimated insulin sensitivity factor)  - 450/TDD = 28 (estimated starting carb ratio for prandial dosing)  - Family History:  Mother, grandmother, and cousins with type 2 diabetes mellitus      Interval HPI:   Overnight events: Improvement in glucose levels seen but still some  "up-trending overnight despite Levemir.  Continue to complain of pain and request opiates, HD cancelled today due to her pain.  No other new overnight events.      BP (!) 185/101 (BP Location: Left arm, Patient Position: Lying)   Pulse 95   Temp 97.7 °F (36.5 °C) (Oral)   Resp 18   Ht 5' 2" (1.575 m)   Wt 54 kg (119 lb)   SpO2 96%   Breastfeeding No   BMI 21.77 kg/m²     Labs Reviewed and Include    Recent Labs   Lab 04/13/23  0716   *   CALCIUM 9.0   ALBUMIN 3.3*   PROT 7.3   *   K 4.7   CO2 18*      BUN 38*   CREATININE 6.1*   ALKPHOS 368*   ALT 28   AST 24   BILITOT 1.1*     Lab Results   Component Value Date    WBC 4.55 04/13/2023    HGB 9.2 (L) 04/13/2023    HCT 28.7 (L) 04/13/2023    MCV 93 04/13/2023     (L) 04/13/2023     No results for input(s): TSH, FREET4 in the last 168 hours.  Lab Results   Component Value Date    HGBA1C 5.8 03/03/2023       Nutritional status:   Body mass index is 21.77 kg/m².  Lab Results   Component Value Date    ALBUMIN 3.3 (L) 04/13/2023    ALBUMIN 3.5 04/10/2023    ALBUMIN 3.3 (L) 04/08/2023     No results found for: PREALBUMIN    Estimated Creatinine Clearance: 10.3 mL/min (A) (based on SCr of 6.1 mg/dL (H)).    Accu-Checks  Recent Labs     04/11/23  1715 04/11/23  2129 04/12/23  0854 04/12/23  0952 04/12/23  1033 04/12/23  1133 04/12/23  1214 04/12/23  1631 04/12/23  2113 04/13/23  0717   POCTGLUCOSE 157* 296* >500* >500* >500* 416* 320* 294* 164* 209*       Current Medications and/or Treatments Impacting Glycemic Control  Immunotherapy:    Immunosuppressants       None          Steroids:   Hormones (From admission, onward)      None          Pressors:    Autonomic Drugs (From admission, onward)      None          Hyperglycemia/Diabetes Medications:   Antihyperglycemics (From admission, onward)      Start     Stop Route Frequency Ordered    04/13/23 2100  insulin detemir U-100 (Levemir) pen 7 Units         -- SubQ 2 times daily 04/13/23 1045    " 04/12/23 1645  insulin aspart U-100 pen 4 Units         -- SubQ 3 times daily with meals 04/12/23 1321    04/12/23 1418  insulin aspart U-100 pen 0-5 Units         -- SubQ Before meals & nightly PRN 04/12/23 1320            ASSESSMENT and PLAN    Renal/  ESRD (end stage renal disease) on dialysis  - High-risk for hypoglycemia as mentioned above, caution with adjustments to insulin  - Management of ESRD per Nephrology with HD    Endocrine  Type 2 diabetes mellitus with hyperglycemia, with long-term current use of insulin  - Presented with Mild DKA versus stress related hyperglycemia with unclear trigger; patient states adherence to insulin therapy with the exception of the morning of admission when she missed her morning Humalog  - High-risk of hypoglycemia given ESRD; has reportedly had recurring episodes of hypoglycemia home  - Improvement since yesterday morning but with some up trending glucose overnight    Plan:   - Continue Levemir to 6 units BID   - Continue Novolog to 4 units TIDAC  - Continue low dose sliding scale  - Hypoglycemia protocol PRN    GI  Gastroparesis - suspected, unconfirmed  - Can contribute to nausea   - Opiates can complicate gastroparesis for pain control  - metoclopramide ordered by primary        Maik Holcomb DO  Endocrinology

## 2023-04-13 NOTE — PROGRESS NOTES
Informed primary nurse that patient is coming back to the floor.  Returned to floor via wheelchair.

## 2023-04-13 NOTE — ASSESSMENT & PLAN NOTE
Patient's FSGs are uncontrolled due to hyperglycemia on current medication regimen. B hydroxybutarate elevated to 0.9. Anion gap of 17. Initial glucose of 509   Last A1c reviewed-   Lab Results   Component Value Date    HGBA1C 5.8 03/03/2023     Most recent fingerstick glucose reviewed-   Recent Labs   Lab 04/12/23  1631 04/12/23  2113 04/13/23  0717   POCTGLUCOSE 294* 164* 209*     Current correctional scale  Low  Maintain anti-hyperglycemic dose as follows-   Antihyperglycemics (From admission, onward)    Start     Stop Route Frequency Ordered    04/13/23 2100  insulin detemir U-100 (Levemir) pen 7 Units         -- SubQ 2 times daily 04/13/23 1045    04/12/23 1645  insulin aspart U-100 pen 4 Units         -- SubQ 3 times daily with meals 04/12/23 1321    04/12/23 1418  insulin aspart U-100 pen 0-5 Units         -- SubQ Before meals & nightly PRN 04/12/23 1320        Hold Oral hypoglycemics while patient is in the hospital.  -Endocrinology consulted, will appreciate recs

## 2023-04-13 NOTE — ASSESSMENT & PLAN NOTE
- Can contribute to nausea   - Opiates can complicate gastroparesis for pain control  - metoclopramide ordered by primary

## 2023-04-13 NOTE — SUBJECTIVE & OBJECTIVE
Interval History: Attempted HD this am but unable to start treatment due to patient having severe abdominal pain/patient unable to remain still for treatment.     Objective:     Vital Signs (Most Recent):  Temp: 97.7 °F (36.5 °C) (04/13/23 1108)  Pulse: 83 (04/13/23 1108)  Resp: 18 (04/13/23 1108)  BP: (!) 151/87 (04/13/23 1108)  SpO2: (!) 93 % (04/13/23 1108)   Vital Signs (24h Range):  Temp:  [97.7 °F (36.5 °C)-98.6 °F (37 °C)] 97.7 °F (36.5 °C)  Pulse:  [83-96] 83  Resp:  [18] 18  SpO2:  [93 %-99 %] 93 %  BP: (151-207)/() 151/87     Weight: 54 kg (119 lb) (04/12/23 0907)  Body mass index is 21.77 kg/m².  Body surface area is 1.54 meters squared.    I/O last 3 completed shifts:  In: 700 [P.O.:700]  Out: -     Physical Exam  Constitutional:       Appearance: Normal appearance.   HENT:      Head: Normocephalic and atraumatic.      Nose: Nose normal.      Mouth/Throat:      Mouth: Mucous membranes are moist.      Pharynx: Oropharynx is clear.   Eyes:      Conjunctiva/sclera: Conjunctivae normal.   Cardiovascular:      Rate and Rhythm: Normal rate and regular rhythm.      Comments: R IJ TDC   Pulmonary:      Effort: Pulmonary effort is normal.      Breath sounds: Normal breath sounds.   Abdominal:      General: Abdomen is flat.   Musculoskeletal:         General: Normal range of motion.      Cervical back: Normal range of motion and neck supple.   Skin:     General: Skin is warm and dry.   Neurological:      General: No focal deficit present.      Mental Status: She is alert and oriented to person, place, and time.   Psychiatric:         Mood and Affect: Mood normal.         Behavior: Behavior normal.       Significant Labs:  CBC:   Recent Labs   Lab 04/13/23  0716   WBC 4.55   RBC 3.10*   HGB 9.2*   HCT 28.7*   *   MCV 93   MCH 29.7   MCHC 32.1       CMP:   Recent Labs   Lab 04/13/23  0716   *   CALCIUM 9.0   ALBUMIN 3.3*   PROT 7.3   *   K 4.7   CO2 18*      BUN 38*   CREATININE 6.1*    ALKPHOS 368*   ALT 28   AST 24   BILITOT 1.1*       All labs within the past 24 hours have been reviewed.

## 2023-04-14 ENCOUNTER — TELEPHONE (OUTPATIENT)
Dept: GASTROENTEROLOGY | Facility: CLINIC | Age: 33
End: 2023-04-14
Payer: MEDICAID

## 2023-04-14 VITALS
SYSTOLIC BLOOD PRESSURE: 154 MMHG | BODY MASS INDEX: 21.9 KG/M2 | WEIGHT: 119 LBS | DIASTOLIC BLOOD PRESSURE: 88 MMHG | HEART RATE: 82 BPM | RESPIRATION RATE: 18 BRPM | TEMPERATURE: 98 F | HEIGHT: 62 IN | OXYGEN SATURATION: 95 %

## 2023-04-14 LAB
ALBUMIN SERPL BCP-MCNC: 2.9 G/DL (ref 3.5–5.2)
ALP SERPL-CCNC: 318 U/L (ref 55–135)
ALT SERPL W/O P-5'-P-CCNC: 22 U/L (ref 10–44)
ANION GAP SERPL CALC-SCNC: 14 MMOL/L (ref 8–16)
AST SERPL-CCNC: 20 U/L (ref 10–40)
BASOPHILS # BLD AUTO: 0.02 K/UL (ref 0–0.2)
BASOPHILS NFR BLD: 0.8 % (ref 0–1.9)
BILIRUB SERPL-MCNC: 0.9 MG/DL (ref 0.1–1)
BUN SERPL-MCNC: 17 MG/DL (ref 6–20)
CALCIUM SERPL-MCNC: 8.7 MG/DL (ref 8.7–10.5)
CHLORIDE SERPL-SCNC: 97 MMOL/L (ref 95–110)
CO2 SERPL-SCNC: 23 MMOL/L (ref 23–29)
CREAT SERPL-MCNC: 2.8 MG/DL (ref 0.5–1.4)
DIFFERENTIAL METHOD: ABNORMAL
EOSINOPHIL # BLD AUTO: 0.1 K/UL (ref 0–0.5)
EOSINOPHIL NFR BLD: 1.9 % (ref 0–8)
ERYTHROCYTE [DISTWIDTH] IN BLOOD BY AUTOMATED COUNT: 17.9 % (ref 11.5–14.5)
EST. GFR  (NO RACE VARIABLE): 22 ML/MIN/1.73 M^2
GLUCOSE SERPL-MCNC: 194 MG/DL (ref 70–110)
HCT VFR BLD AUTO: 27.5 % (ref 37–48.5)
HGB BLD-MCNC: 8.9 G/DL (ref 12–16)
IMM GRANULOCYTES # BLD AUTO: 0.01 K/UL (ref 0–0.04)
IMM GRANULOCYTES NFR BLD AUTO: 0.4 % (ref 0–0.5)
LYMPHOCYTES # BLD AUTO: 0.7 K/UL (ref 1–4.8)
LYMPHOCYTES NFR BLD: 28.2 % (ref 18–48)
MAGNESIUM SERPL-MCNC: 1.8 MG/DL (ref 1.6–2.6)
MCH RBC QN AUTO: 29.3 PG (ref 27–31)
MCHC RBC AUTO-ENTMCNC: 32.4 G/DL (ref 32–36)
MCV RBC AUTO: 91 FL (ref 82–98)
MONOCYTES # BLD AUTO: 0.2 K/UL (ref 0.3–1)
MONOCYTES NFR BLD: 9.3 % (ref 4–15)
NEUTROPHILS # BLD AUTO: 1.5 K/UL (ref 1.8–7.7)
NEUTROPHILS NFR BLD: 59.4 % (ref 38–73)
NRBC BLD-RTO: 0 /100 WBC
PHOSPHATE SERPL-MCNC: 2.4 MG/DL (ref 2.7–4.5)
PLATELET # BLD AUTO: 93 K/UL (ref 150–450)
PMV BLD AUTO: 10.5 FL (ref 9.2–12.9)
POCT GLUCOSE: 216 MG/DL (ref 70–110)
POCT GLUCOSE: 260 MG/DL (ref 70–110)
POTASSIUM SERPL-SCNC: 3.5 MMOL/L (ref 3.5–5.1)
PROT SERPL-MCNC: 6.4 G/DL (ref 6–8.4)
RBC # BLD AUTO: 3.04 M/UL (ref 4–5.4)
SODIUM SERPL-SCNC: 134 MMOL/L (ref 136–145)
WBC # BLD AUTO: 2.59 K/UL (ref 3.9–12.7)

## 2023-04-14 PROCEDURE — 83735 ASSAY OF MAGNESIUM: CPT | Performed by: STUDENT IN AN ORGANIZED HEALTH CARE EDUCATION/TRAINING PROGRAM

## 2023-04-14 PROCEDURE — 84100 ASSAY OF PHOSPHORUS: CPT | Performed by: STUDENT IN AN ORGANIZED HEALTH CARE EDUCATION/TRAINING PROGRAM

## 2023-04-14 PROCEDURE — 99232 SBSQ HOSP IP/OBS MODERATE 35: CPT | Mod: ,,, | Performed by: INTERNAL MEDICINE

## 2023-04-14 PROCEDURE — 36415 COLL VENOUS BLD VENIPUNCTURE: CPT | Performed by: STUDENT IN AN ORGANIZED HEALTH CARE EDUCATION/TRAINING PROGRAM

## 2023-04-14 PROCEDURE — 25000003 PHARM REV CODE 250: Performed by: HOSPITALIST

## 2023-04-14 PROCEDURE — 85025 COMPLETE CBC W/AUTO DIFF WBC: CPT | Performed by: STUDENT IN AN ORGANIZED HEALTH CARE EDUCATION/TRAINING PROGRAM

## 2023-04-14 PROCEDURE — 80053 COMPREHEN METABOLIC PANEL: CPT | Performed by: STUDENT IN AN ORGANIZED HEALTH CARE EDUCATION/TRAINING PROGRAM

## 2023-04-14 PROCEDURE — 99238 PR HOSPITAL DISCHARGE DAY,<30 MIN: ICD-10-PCS | Mod: ,,, | Performed by: HOSPITALIST

## 2023-04-14 PROCEDURE — 80100014 HC HEMODIALYSIS 1:1

## 2023-04-14 PROCEDURE — 99232 PR SUBSEQUENT HOSPITAL CARE,LEVL II: ICD-10-PCS | Mod: ,,, | Performed by: INTERNAL MEDICINE

## 2023-04-14 PROCEDURE — 25000003 PHARM REV CODE 250

## 2023-04-14 PROCEDURE — 25000003 PHARM REV CODE 250: Performed by: STUDENT IN AN ORGANIZED HEALTH CARE EDUCATION/TRAINING PROGRAM

## 2023-04-14 PROCEDURE — 99238 HOSP IP/OBS DSCHRG MGMT 30/<: CPT | Mod: ,,, | Performed by: HOSPITALIST

## 2023-04-14 RX ORDER — INSULIN ASPART 100 [IU]/ML
4 INJECTION, SOLUTION INTRAVENOUS; SUBCUTANEOUS
Qty: 15 ML | Refills: 3 | Status: SHIPPED | OUTPATIENT
Start: 2023-04-14 | End: 2023-08-17 | Stop reason: SDUPTHER

## 2023-04-14 RX ORDER — AMLODIPINE BESYLATE 5 MG/1
5 TABLET ORAL DAILY
Qty: 90 TABLET | Refills: 3 | Status: ON HOLD | OUTPATIENT
Start: 2023-04-14 | End: 2023-04-23 | Stop reason: SDUPTHER

## 2023-04-14 RX ORDER — DICYCLOMINE HYDROCHLORIDE 10 MG/1
10 CAPSULE ORAL 4 TIMES DAILY PRN
Qty: 20 CAPSULE | Refills: 0 | Status: ON HOLD | OUTPATIENT
Start: 2023-04-14 | End: 2023-05-17

## 2023-04-14 RX ORDER — SUCRALFATE 1 G/10ML
1 SUSPENSION ORAL
Qty: 1200 ML | Refills: 0 | Status: ON HOLD | OUTPATIENT
Start: 2023-04-14 | End: 2023-05-17

## 2023-04-14 RX ORDER — SODIUM CHLORIDE 9 MG/ML
INJECTION, SOLUTION INTRAVENOUS ONCE
Status: DISCONTINUED | OUTPATIENT
Start: 2023-04-15 | End: 2023-04-14 | Stop reason: HOSPADM

## 2023-04-14 RX ORDER — AMITRIPTYLINE HYDROCHLORIDE 50 MG/1
50 TABLET, FILM COATED ORAL NIGHTLY
Qty: 30 TABLET | Refills: 6 | Status: SHIPPED | OUTPATIENT
Start: 2023-04-14 | End: 2023-05-11

## 2023-04-14 RX ORDER — PEN NEEDLE, DIABETIC 30 GX3/16"
NEEDLE, DISPOSABLE MISCELLANEOUS
Qty: 100 EACH | Refills: 11 | Status: SHIPPED | OUTPATIENT
Start: 2023-04-14

## 2023-04-14 RX ADMIN — LEVETIRACETAM 500 MG: 500 TABLET, FILM COATED ORAL at 09:04

## 2023-04-14 RX ADMIN — GABAPENTIN 300 MG: 300 CAPSULE ORAL at 09:04

## 2023-04-14 RX ADMIN — APIXABAN 5 MG: 5 TABLET, FILM COATED ORAL at 09:04

## 2023-04-14 RX ADMIN — INSULIN ASPART 2 UNITS: 100 INJECTION, SOLUTION INTRAVENOUS; SUBCUTANEOUS at 01:04

## 2023-04-14 RX ADMIN — LIDOCAINE 1 PATCH: 50 PATCH CUTANEOUS at 11:04

## 2023-04-14 RX ADMIN — PANTOPRAZOLE SODIUM 40 MG: 40 TABLET, DELAYED RELEASE ORAL at 09:04

## 2023-04-14 RX ADMIN — ISOSORBIDE MONONITRATE 60 MG: 30 TABLET, EXTENDED RELEASE ORAL at 09:04

## 2023-04-14 RX ADMIN — SUCRALFATE 1 G: 1 SUSPENSION ORAL at 06:04

## 2023-04-14 RX ADMIN — SUCRALFATE 1 G: 1 SUSPENSION ORAL at 11:04

## 2023-04-14 RX ADMIN — AMLODIPINE BESYLATE 5 MG: 5 TABLET ORAL at 09:04

## 2023-04-14 RX ADMIN — CARVEDILOL 25 MG: 25 TABLET, FILM COATED ORAL at 09:04

## 2023-04-14 RX ADMIN — INSULIN ASPART 4 UNITS: 100 INJECTION, SOLUTION INTRAVENOUS; SUBCUTANEOUS at 01:04

## 2023-04-14 RX ADMIN — FLUOXETINE 20 MG: 20 CAPSULE ORAL at 09:04

## 2023-04-14 RX ADMIN — INSULIN ASPART 4 UNITS: 100 INJECTION, SOLUTION INTRAVENOUS; SUBCUTANEOUS at 09:04

## 2023-04-14 RX ADMIN — INSULIN ASPART 3 UNITS: 100 INJECTION, SOLUTION INTRAVENOUS; SUBCUTANEOUS at 09:04

## 2023-04-14 RX ADMIN — HYDRALAZINE HYDROCHLORIDE 100 MG: 50 TABLET ORAL at 09:04

## 2023-04-14 NOTE — TELEPHONE ENCOUNTER
----- Message from Efra Montano sent at 4/14/2023  9:50 AM CDT -----  Contact: @[phone]  Caller is calling in to schedule pt an appt from a referral of a hospital follow up, please call to discuss further.

## 2023-04-14 NOTE — NURSING
Discharge education given to pt regarding medications, future appointments, and when to call a healthcare provider. Both verbalized understanding. Telemetry and PIV removed prior to d/c. Pt is AAOx4; afebrile; vital signs stable. Transport is bringing her downstairs and mom will be driving her home.

## 2023-04-14 NOTE — NURSING
04/14/23 0530 04/14/23 0540   During Hemodialysis Assessment   Blood Flow Rate (mL/min) 400 mL/min  --    Dialysate Flow Rate (mL/min) 800 ml/min  --    Ultrafiltration Rate (mL/Hr) 1000 mL/Hr  --    Arteriovenous Lines Secure Yes  --    Arterial Pressure (mmHg) -180 mmHg  --    Venous Pressure (mmHg) 180  --    UF Removed (mL) 3500 mL  --    TMP 50  --    Venous Line in Air Detector Yes  --    Intake (mL) 250 mL  --    Transducer Dry Yes  --    Access Visible Yes  --    Intra-Hemodialysis Comments Tx complete  --    Post-Hemodialysis Assessment   Blood Volume Processed (Liters)  --  84.1 L   Dialyzer Clearance  --  Moderately streaked   Duration of Treatment  --  210 minutes   Total UF (mL)  --  3500 mL   Net Fluid Removal  --  3000   Patient Response to Treatment  --  Pt tolerated HD very well   Post-Hemodialysis Comments  --  Tx complete     HD x 3.5 hours via [facility] TDC, UF: -3000ml net, pt tolerated HD very well, report given to primary nurse.

## 2023-04-14 NOTE — DISCHARGE SUMMARY
[facility] Medicine  Discharge Summary      Patient Name: Tabby Howard  MRN: [id number]  UNIQUE: [id number]  Patient Class: IP- Inpatient  Admission Date: 4/10/2023  Hospital Length of Stay: 3 days  Discharge Date and Time:  04/14/2023 11:05 AM  Attending Physician: Deandre Irby MD   Discharging Provider: Violette Winslow MD  Primary Care Provider: AIDEN CONNER NP  Hospital Medicine Team: [facility] HOSP MED 1 Violette Winslow MD  Primary Care Team: [facility] HOSP MED 1    HPI:   Pt is a 33 y.o. female PMH of ESRD on HD T/Th/S, T2DM on insulin, HFpEF, HTN, unknown seizure disorder, DVT of LUE on apixiban, who presented to [facility] ED after prompting from oupt endocrinology appointment when pt was discovered to have glucose >500 accompanied by N/V and abdominal pain. Pt reports abdominal pain began 3 days ago and has progressively worsened. She also reports chest pain and low central back pain that also began 3 days ago. With regards to chest pain patient explains it feels like pressure and has been consistent since onset, also endorses orthopnea. Pt is unable to described abdominal pain due to being in significant pain at time of exam. Of note pt was recently admitted for abdominal pain, was discharged from this admission on 4/8, she states that this episode of pain is different from her prior admission. Pt denies missing any dialysis sessions, reports no changes in her diet prior to onset of abdominal pain or N/V. States that her glucose has been in the 200s for the past 3 days concurrently with the abdominal pain, and that she has not missed any doses of home insulin.     While in ED CTAP was obtained, negative for bowel obstruction, significant for bladder wall thickening concerning for cystitis. BNP elevated to 2,187, CXR with marked cardiomegaly, but there has been no significant detrimental interval change, no pleural effusion. Initial trop elevated to 0.042, now down trending. Glucose elevated  to 509, B hydroxybutarate elevated, anion gap elevated. Pt admitted to hospital medicine with DKA, endocrine consult by ED, recommended insulin gtt and K containing IVF.       * No surgery found *      Hospital Course:   Patient admitted to hospital medicine for DKA. She was started on insulin infusion and noted to have labile blood glucose. She was subsequently transitioned to subcutaneous insulin with stabilization of blood glucose. During hospitalization, patient with severe abdominal pain. CT of abdomen without acute abnormalities to explain abdominal pain. Given nature and history, suspect likely gastroparesis. Initiated multimodal pain regimen but patient noncompliant and requesting IV dilaudid. Discussed in depth with patient that opioids would likely worsen pain. GI consulted to evaluate for concern of gastroparesis and refractory pain. Pt stable for discharge at this time, glucose trends stabilized. Outpatient referrals to endocrinology, GI, pain management placed.       Goals of Care Treatment Preferences:  Code Status: Full Code    Health care agent: Step-Mother Tanya Howard / Father Garcia Howard  Health care agent number: [phone] / [phone]          What is most important right now is to focus on remaining as independent as possible.  Accordingly, we have decided that the best plan to meet the patient's goals includes continuing with treatment.      Consults:   Consults (From admission, onward)        Status Ordering Provider     Inpatient consult to Gastroenterology  Once        Provider:  (Not yet assigned)    Completed DHRUV ZARAGOZA     Inpatient consult to Registered Dietitian/Nutritionist  Once        Provider:  (Not yet assigned)    Completed PATRICK WILLSON     Inpatient consult to Endocrinology  Once        Provider:  (Not yet assigned)    Completed SACHA MAST     Inpatient consult to Nephrology  Once        Provider:  (Not yet assigned)    Completed SACHA MAST           Neuro  Seizure disorder  Cont home keppra      Psychiatric  Mood disorder  Cont home fluoxetine   Started amitriptyline on admission      Cardiac/Vascular  Chronic congestive heart failure with left ventricular diastolic dysfunction  Patient is identified as having Diastolic (HFpEF) heart failure that is Acute on chronic. CHF is currently uncontrolled due to >3 pillow orthopnea. Latest ECHO performed and demonstrates- Results for orders placed during the hospital encounter of 01/12/23    Echo    Interpretation Summary  · The left ventricle is normal in size with moderate concentric hypertrophy and normal systolic function.  · The estimated ejection fraction is 55%.  · Grade III left ventricular diastolic dysfunction.  · Normal right ventricular size with normal right ventricular systolic function.  · Moderate left atrial enlargement.  · Moderate to severe tricuspid regurgitation.  · Mild to moderate pulmonic regurgitation.  · Mild mitral regurgitation.  · Normal central venous pressure (3 mmHg).  · The estimated PA systolic pressure is 56 mmHg.  · There is pulmonary hypertension.  · Moderate to large circumferential pericardial effusion. No evidence of tamponade.  . Continue Beta Blocker and monitor clinical status closely. Monitor on telemetry. Patient is off CHF pathway.  Monitor strict Is&Os and daily weights.  Place on fluid restriction of 1.5 L. Continue to stress to patient importance of self efficacy and  on diet for CHF. Last BNP reviewed- and noted below   Recent Labs   Lab 04/10/23  1031   BNP 2,187*   .      Uncontrolled hypertension  Pt with hx of HTN, home regimen including norvasc 10mg, coreg 25mg, clonidine patch, hydralazine 100mg, imdur 60mg. /111 at time of initial exam, considerable pain likely contributing to elevation     -coreg 25mg daily  -amlodipine 5mg daily  -hydralazine 100mg q12h  -imdur 60mg daily     Renal/  ESRD (end stage renal disease) on dialysis  Pt with ESRD  on dialysis T/Th/S. Pt reports having not missed a dialysis session in the past week. Pt does not make urine     -nephrology consulted for dialysis, will appreciate recs  -HD completed on 4/11, unable to complete HD 4/13 due to abdominal pain-> completed on 4/14    Hematology  Chronic deep vein thrombosis (DVT) of left upper extremity  Cont home apixiban       Oncology  Anemia in ESRD (end-stage renal disease)  Pt with known history of anemia in setting of ESRD. Hgb of 8.4 at time of exam     -trend daily CBC   -transfuse if Hbg <7      Endocrine  * DKA (diabetic ketoacidosis)  Patient's FSGs are uncontrolled due to hyperglycemia on current medication regimen. B hydroxybutarate elevated to 0.9. Anion gap of 17. Initial glucose of 509   Last A1c reviewed-   Lab Results   Component Value Date    HGBA1C 5.8 03/03/2023     Most recent fingerstick glucose reviewed-   Recent Labs   Lab 04/13/23  1226 04/13/23  1730 04/13/23  2035 04/14/23  0846   POCTGLUCOSE 108 156* 220* 260*     Current correctional scale  Low  Maintain anti-hyperglycemic dose as follows-   Antihyperglycemics (From admission, onward)    Start     Stop Route Frequency Ordered    04/13/23 2100  insulin detemir U-100 (Levemir) pen 6 Units         -- SubQ 2 times daily 04/13/23 1341    04/12/23 1645  insulin aspart U-100 pen 4 Units         -- SubQ 3 times daily with meals 04/12/23 1321    04/12/23 1418  insulin aspart U-100 pen 0-5 Units         -- SubQ Before meals & nightly PRN 04/12/23 1320        Hold Oral hypoglycemics while patient is in the hospital.  -Endocrinology consulted, will appreciate recs      Type 2 diabetes mellitus with hyperglycemia, with long-term current use of insulin  See DKA    GI  Abdominal pain  Pt presented with severe abdominal pain x3 days, she states it has progressively worsened. Pt found to be in DKA, could possibly be contributing to pain. CTAP negative for bowel obstruction, significant for bladder wall thickening, pt  denies any symptoms of agitated bladder, pt is anuric 2/2 ESRD. Of note pt admitted 7 times in 2023 for similar episodes of abdominal pain, per past medical records gastroparesis is suspected however not confirmed. Etiology of abdominal pain possibly DKA vs function abdominal pain vs gastroparesis. Patient trialed on multimodal pain regimen but perseverating on dilaudid use. Suspect that opioid use may worsen abdominal pain.    - trialed reglan without improvement  - started amitriptyline  - tylenol, capsaicin cream and lidocaine patch available  - zofran prn for nausea   - see DKA for remainder of plan      Gastroparesis - suspected, unconfirmed  See abdominal pain        Final Active Diagnoses:    Diagnosis Date Noted POA    PRINCIPAL PROBLEM:  DKA (diabetic ketoacidosis) [E11.10] 04/23/2021 Yes    Vomiting [R11.10] 04/13/2023 Yes    Chronic deep vein thrombosis (DVT) of left upper extremity [I82.722] 04/10/2023 Yes    Mood disorder [F39] 04/10/2023 Yes    Anemia in ESRD (end-stage renal disease) [N18.6, D63.1] 03/20/2023 Yes    Chronic congestive heart failure with left ventricular diastolic dysfunction [I50.32] 03/02/2023 Yes    Type 2 diabetes mellitus with hyperglycemia, with long-term current use of insulin [E11.65, Z79.4] 01/27/2023 Not Applicable    Abdominal pain [R10.9] 01/24/2023 Yes    Anemia of chronic kidney failure, stage 5 [N18.5, D63.1] 10/31/2022 Yes     Chronic    Thrombocytopenia [D69.6] 10/26/2022 Yes     Chronic    Uncontrolled hypertension [I10] 07/30/2022 Yes    ESRD (end stage renal disease) on dialysis [N18.6, Z99.2] 07/22/2022 Not Applicable     Chronic    Seizure disorder [G40.909] 05/29/2022 Yes     Chronic    Gastroparesis - suspected, unconfirmed [K31.84] 04/12/2022 Yes     Chronic    Chronic kidney disease-mineral and bone disorder [N18.9, E83.9, M89.9] 04/24/2021 Yes      Problems Resolved During this Admission:       Discharged Condition: good    Disposition: Home or  Self Care    Follow Up:   Follow-up Information     [facility] ENDOCRINOLOGY. Schedule an appointment as soon as possible for a visit in 1 week(s).    Specialty: Endocrinology  Contact information:  Eunice [facility]ru[facility]  [facility] [id number] [phone]           [facility] GASTROENTEROLOGY. Schedule an appointment as soon as possible for a visit in 1 week(s).    Specialty: Gastroenterology  Contact information:  Eunice Richardson josiah  [facility] [id number] [phone]           [facility] .    Specialty: Pain Medicine  Contact information:  [address], [address]  [facility] [address] [phone]  Additional information:  Internal Medicine - [facility], 9th Floor   Please park in [address] and use [place] elevators                     Patient Instructions:      Ambulatory referral/consult to Endocrinology   Standing Status: Future   Referral Priority: Routine Referral Type: Consultation   Requested Specialty: Endocrinology   Number of Visits Requested: 1     Ambulatory referral/consult to [facility]   Standing Status: Future   Referral Priority: Routine Referral Type: Consultation   Referral Reason: Specialty Services Required   Requested Specialty: Pain Medicine   Number of Visits Requested: 1     Diet diabetic     Notify your health care provider if you experience any of the following:  persistent nausea and vomiting or diarrhea     Activity as tolerated       Significant Diagnostic Studies: N/A    Pending Diagnostic Studies:     Procedure Component Value Units Date/Time    Anti-islet cell antibody [[id number]] Collected: 04/12/23 0917    Order Status: Sent Lab Status: In process Updated: 04/12/23 0928    Specimen: Blood     Basic Metabolic Panel (BMP) [[id number]] Collected: 04/11/23 2311    Order Status: Sent Lab Status: In process Updated: 04/11/23 2311    Specimen: Blood     Basic Metabolic Panel (BMP) [[id number]] Collected: 04/11/23  "1938    Order Status: Sent Lab Status: In process Updated: 04/11/23 1939    Specimen: Blood     Glutamic acid decarboxylase [[id number]] Collected: 04/12/23 0917    Order Status: Sent Lab Status: In process Updated: 04/12/23 0927    Specimen: Blood     Urinalysis, Reflex to Urine Culture Urine, Clean Catch [[id number]] Collected: 04/10/23 1024    Order Status: Sent Lab Status: In process Updated: 04/10/23 1024    Specimen: Urine          Medications:  Reconciled Home Medications:      Medication List      START taking these medications    amitriptyline 50 MG tablet  Commonly known as: ELAVIL  Take 1 tablet (50 mg total) by mouth every evening.     BD ULTRA-FINE MINI PEN NEEDLE 31 gauge x 3/16" Ndle  Generic drug: pen needle, diabetic  Use as directed to inject insulin 5 times daily     dicyclomine 10 MG capsule  Commonly known as: BENTYL  Take 1 capsule (10 mg total) by mouth 4 (four) times daily as needed (abdominal pain).     insulin detemir U-100 (Levemir) 100 unit/mL (3 mL) Inpn pen  Inject 6 Units into the skin 2 (two) times daily.  Replaces: insulin detemir U-100 100 unit/mL injection     NovoLOG FlexPen U-100 Insulin 100 unit/mL (3 mL) Inpn pen  Generic drug: insulin aspart U-100  Inject 4 Units into the skin 3 (three) times daily with meals.     sucralfate 100 mg/mL suspension  Commonly known as: CARAFATE  Take 10 mLs (1 g total) by mouth 4 (four) times daily before meals and nightly.        CHANGE how you take these medications    amLODIPine 5 MG tablet  Commonly known as: NORVASC  Take 1 tablet (5 mg total) by mouth once daily.  What changed:   · medication strength  · how much to take  · how to take this  · when to take this     ondansetron 4 MG Tbdl  Commonly known as: ZOFRAN-ODT  Take 1 tablet (4 mg total) by mouth every 6 (six) hours as needed.  What changed: Another medication with the same name was removed. Continue taking this medication, and follow the directions you see here.        CONTINUE taking " these medications    albuterol 90 mcg/actuation inhaler  Commonly known as: PROVENTIL/VENTOLIN HFA  Inhale 2 puffs into the lungs every 6 (six) hours as needed for Wheezing. Rescue     carvediloL 25 MG tablet  Commonly known as: COREG  Take 1 tablet twice a day by oral route for 30 days.     ELIQUIS 5 mg Tab  Generic drug: apixaban  Take 1 tablet (5 mg total) by mouth 2 (two) times daily.     FLUoxetine 20 MG capsule  Take 1 capsule every day by oral route for 30 days.     gabapentin 100 MG capsule  Commonly known as: NEURONTIN  Take 1 capsule by mouth 3 times daily     hydrALAZINE 100 MG tablet  Commonly known as: APRESOLINE  Take 1 tablet twice a day by oral route for 30 days.     * HYDROcodone-acetaminophen 5-325 mg per tablet  Commonly known as: NORCO  Take 1 tablet by mouth every 6 (six) hours as needed for Pain.     * HYDROcodone-acetaminophen  mg per tablet  Commonly known as: NORCO  Take 1 tablet by mouth every 6 (six) hours as needed for Pain.     isosorbide mononitrate 60 MG 24 hr tablet  Commonly known as: IMDUR  Take 1 tablet every day by oral route for 30 days.     levETIRAcetam 500 MG Tab  Commonly known as: KEPPRA  Take 1 tablet twice a day by oral route for 30 days.     pantoprazole 40 MG tablet  Commonly known as: PROTONIX  Take 1 tablet every day by oral route for 30 days.         * This list has 2 medication(s) that are the same as other medications prescribed for you. Read the directions carefully, and ask your doctor or other care provider to review them with you.            STOP taking these medications    cloNIDine 0.2 mg/24 hr td ptwk 0.2 mg/24 hr  Commonly known as: CATAPRES     insulin detemir U-100 100 unit/mL injection  Commonly known as: Levemir  Replaced by: insulin detemir U-100 (Levemir) 100 unit/mL (3 mL) Inpn pen     mirtazapine 15 MG disintegrating tablet  Commonly known as: REMERON SOL-TAB     ondansetron 4 MG tablet  Commonly known as: ZOFRAN            Indwelling  Lines/Drains at time of discharge:   Lines/Drains/Airways     Central Venous Catheter Line  Duration                Hemodialysis Catheter 12/09/22 right subclavian 126 days                Time spent on the discharge of patient: 30 minutes         Violette Winslow MD  Department of Hospital Medicine  [facility] - [facility] Step[facility]

## 2023-04-14 NOTE — PROGRESS NOTES
04/14/23 0200   During Hemodialysis Assessment   Blood Flow Rate (mL/min) 400 mL/min   Dialysate Flow Rate (mL/min) 800 ml/min   Ultrafiltration Rate (mL/Hr) 1000 mL/Hr   Arteriovenous Lines Secure Yes   Arterial Pressure (mmHg) -190 mmHg   Venous Pressure (mmHg) 180   Blood Volume Processed (Liters) 0 L   UF Removed (mL) 0 mL   TMP 50   Venous Line in Air Detector Yes   Intake (mL) 250 mL   Transducer Dry Yes   Access Visible Yes   Intra-Hemodialysis Comments Tx started per MD orders     Report received from primary nurse. HD started per MD orders. Pt calm/cooperative at this time.

## 2023-04-14 NOTE — PLAN OF CARE
Problem: Adult Inpatient Plan of Care  Goal: Plan of Care Review  Outcome: Ongoing, Progressing  Goal: Optimal Comfort and Wellbeing  Outcome: Ongoing, Progressing     Problem: Diabetic Ketoacidosis  Goal: Fluid and Electrolyte Balance with Absence of Ketosis  Outcome: Ongoing, Progressing     Problem: Diabetes Comorbidity  Goal: Blood Glucose Level Within Targeted Range  Outcome: Ongoing, Progressing     Problem: Glycemic Control Impaired (Sepsis/Septic Shock)  Goal: Blood Glucose Level Within Desired Range  Outcome: Ongoing, Progressing     Problem: Nutrition Impaired (Sepsis/Septic Shock)  Goal: Optimal Nutrition Intake  Outcome: Ongoing, Progressing     Problem: Infection  Goal: Absence of Infection Signs and Symptoms  Outcome: Ongoing, Progressing     Problem: Electrolyte Imbalance (Chronic Kidney Disease)  Goal: Electrolyte Balance  Outcome: Ongoing, Progressing   Overnight pt - good appetite (cereal, burger luis angel, green beans). Pt c/o pain 10/10 requesting stronger pain medication. New order for tylenol 1000mg Q6 PRN. Tolerated well. Rested well post pain medication. No episodes of emesis. Dialysis performed at the bedside; started at 2am 4/14 d/t pt refusing on dayshift. Currently still receiving dialysis at the bedside. Safety mainted throughout shift. SR up x 2. Call light in place bed locked in lowest positon with alarm set. Frequent rounding in place.

## 2023-04-14 NOTE — PROGRESS NOTES
[facility] - Transplant Stepdown  Endocrinology  Progress Note    Admit Date: 4/10/2023     33 year old  female with type 2 diabetes mellitus, ESRD on HD (TTS), CHF, gastroparesis, sickle cell trait, seizure disorder, and hypertension.     - Patient sent to ED from endocrine clinic for severe hyperglycemia and concern for DKA    - Endocrinology consulted for management of severe hyperglycemia and type 2 diabetes mellitus    Regarding Type 2 diabetes mellitus:    Lab Results   Component Value Date    CO2 21 (L) 04/10/2023    CO2 25 2023    CO2 25 2023    CO2 26 2023    ANIONGAP 17 (H) 04/10/2023    ANIONGAP 15 2023    ANIONGAP 17 (H) 2023    ANIONGAP 6 (L) 2023    K 4.4 04/10/2023    K 3.1 (L) 2023    K 3.8 2023    EGFRNORACEVR 8.7 (A) 04/10/2023    EGFRNORACEVR 19.5 (A) 2023    BHYDRXBUT 0.9 (H) 04/10/2023    BHYDRXBUT 0.1 2023    HGBA1C 5.8 2023    HGBA1C 7.5 (H) 2022    HGBA1C 6.2 2022      Lab Results   Component Value Date    LIPASE 21 04/10/2023     Lab Results   Component Value Date     04/10/2023     2023     2023     (HH) 04/10/2023     (H) 2023     (H) 2023      - Corrected sodium calculator = 144 mEq/L    - Initially diagnosed with diabetes mellitus:  with gestational diabetes  - Home diabetic medications include: Patient unsure as her mother handles her insulin injections but she thinks she is on Lantus 18 units daily and NovoLog 8 units TIDWM  - Other medications tried: NA  - Weight based dosin kg x 0.3 (ESRD) = 16 TDD x 0.5 = 8 basal / 8 prandial  - 1700/TDD = 106 (estimated insulin sensitivity factor)  - 450/TDD = 28 (estimated starting carb ratio for prandial dosing)  - Family History:  Mother, grandmother, and cousins with type 2 diabetes mellitus      Interval HPI:   Overnight events:  Oral intake improved yesterday with glucose values  "higher this morning as a result.  No acute events reported.  Possible discharge today.        /83 (BP Location: Left arm, Patient Position: Sitting)   Pulse 77   Temp 98.6 °F (37 °C) (Oral)   Resp 16   Ht 5' 2" (1.575 m)   Wt 54 kg (119 lb)   SpO2 97%   Breastfeeding No   BMI 21.77 kg/m²     Labs Reviewed and Include    Recent Labs   Lab 04/14/23  0632   *   CALCIUM 8.7   ALBUMIN 2.9*   PROT 6.4   *   K 3.5   CO2 23   CL 97   BUN 17   CREATININE 2.8*   ALKPHOS 318*   ALT 22   AST 20   BILITOT 0.9     Lab Results   Component Value Date    WBC 2.59 (L) 04/14/2023    HGB 8.9 (L) 04/14/2023    HCT 27.5 (L) 04/14/2023    MCV 91 04/14/2023    PLT 93 (L) 04/14/2023     No results for input(s): TSH, FREET4 in the last 168 hours.  Lab Results   Component Value Date    HGBA1C 5.8 03/03/2023       Nutritional status:   Body mass index is 21.77 kg/m².  Lab Results   Component Value Date    ALBUMIN 2.9 (L) 04/14/2023    ALBUMIN 3.3 (L) 04/13/2023    ALBUMIN 3.5 04/10/2023     No results found for: PREALBUMIN    Estimated Creatinine Clearance: 22.4 mL/min (A) (based on SCr of 2.8 mg/dL (H)).    Accu-Checks  Recent Labs     04/12/23  1033 04/12/23  1133 04/12/23  1214 04/12/23  1631 04/12/23  2113 04/13/23  0717 04/13/23  1226 04/13/23  1730 04/13/23  2035 04/14/23  0846   POCTGLUCOSE >500* 416* 320* 294* 164* 209* 108 156* 220* 260*       Current Medications and/or Treatments Impacting Glycemic Control  Immunotherapy:    Immunosuppressants       None          Steroids:   Hormones (From admission, onward)      None          Pressors:    Autonomic Drugs (From admission, onward)      None          Hyperglycemia/Diabetes Medications:   Antihyperglycemics (From admission, onward)      Start     Stop Route Frequency Ordered    04/13/23 2100  insulin detemir U-100 (Levemir) pen 6 Units         -- SubQ 2 times daily 04/13/23 1341    04/12/23 1645  insulin aspart U-100 pen 4 Units         -- SubQ 3 times daily " with meals 04/12/23 1321    04/12/23 1418  insulin aspart U-100 pen 0-5 Units         -- SubQ Before meals & nightly PRN 04/12/23 1320            ASSESSMENT and PLAN    Renal/  ESRD (end stage renal disease) on dialysis  - High-risk for hypoglycemia as mentioned above, caution with adjustments to insulin  - Management of ESRD per Nephrology with HD    Endocrine  Type 2 diabetes mellitus with hyperglycemia, with long-term current use of insulin  - Presented with Mild DKA versus stress related hyperglycemia with unclear trigger; patient states adherence to insulin therapy with the exception of the morning of admission when she missed her morning Humalog  - High-risk of hypoglycemia given ESRD; has reportedly had recurring episodes of hypoglycemia at home  - Some elevated blood glucose levels overnight into this morning    Plan:   - Continue Levemir 6 units BID   - Continue Novolog to 4 units TIDAC  - Continue low dose sliding scale  - Hypoglycemia protocol PRN    GI  Gastroparesis - suspected, unconfirmed  - Can contribute to nausea   - Opiates can complicate gastroparesis for pain control  - metoclopramide ordered by primary      Discharge Recommendations:  Based on history of hypoglycemia at home we would suggest discharge on insulin regimen similar to above with 12 units long acting insulin for the day (split BID if prescribed Levemir) and 4 units of short acting insulin with each meal plus low dose sliding scale.  This is close to her weight based predicted needs.  She will need to reach out to the endocrinology office to re-schedule from her visit earlier in the week which had to be cancelled due to this admission.      Maik Holcomb, DO  Endocrinology

## 2023-04-14 NOTE — SUBJECTIVE & OBJECTIVE
"Interval HPI:   Overnight events:  Oral intake improved yesterday with glucose values higher this morning as a result.  No acute events reported.  Possible discharge today.        /83 (BP Location: Left arm, Patient Position: Sitting)   Pulse 77   Temp 98.6 °F (37 °C) (Oral)   Resp 16   Ht 5' 2" (1.575 m)   Wt 54 kg (119 lb)   SpO2 97%   Breastfeeding No   BMI 21.77 kg/m²     Labs Reviewed and Include    Recent Labs   Lab 04/14/23  0632   *   CALCIUM 8.7   ALBUMIN 2.9*   PROT 6.4   *   K 3.5   CO2 23   CL 97   BUN 17   CREATININE 2.8*   ALKPHOS 318*   ALT 22   AST 20   BILITOT 0.9     Lab Results   Component Value Date    WBC 2.59 (L) 04/14/2023    HGB 8.9 (L) 04/14/2023    HCT 27.5 (L) 04/14/2023    MCV 91 04/14/2023    PLT 93 (L) 04/14/2023     No results for input(s): TSH, FREET4 in the last 168 hours.  Lab Results   Component Value Date    HGBA1C 5.8 03/03/2023       Nutritional status:   Body mass index is 21.77 kg/m².  Lab Results   Component Value Date    ALBUMIN 2.9 (L) 04/14/2023    ALBUMIN 3.3 (L) 04/13/2023    ALBUMIN 3.5 04/10/2023     No results found for: PREALBUMIN    Estimated Creatinine Clearance: 22.4 mL/min (A) (based on SCr of 2.8 mg/dL (H)).    Accu-Checks  Recent Labs     04/12/23  1033 04/12/23  1133 04/12/23  1214 04/12/23  1631 04/12/23  2113 04/13/23  0717 04/13/23  1226 04/13/23  1730 04/13/23  2035 04/14/23  0846   POCTGLUCOSE >500* 416* 320* 294* 164* 209* 108 156* 220* 260*       Current Medications and/or Treatments Impacting Glycemic Control  Immunotherapy:    Immunosuppressants       None          Steroids:   Hormones (From admission, onward)      None          Pressors:    Autonomic Drugs (From admission, onward)      None          Hyperglycemia/Diabetes Medications:   Antihyperglycemics (From admission, onward)      Start     Stop Route Frequency Ordered    04/13/23 2100  insulin detemir U-100 (Levemir) pen 6 Units         -- SubQ 2 times daily 04/13/23 " 1341    04/12/23 1645  insulin aspart U-100 pen 4 Units         -- SubQ 3 times daily with meals 04/12/23 1321    04/12/23 1418  insulin aspart U-100 pen 0-5 Units         -- SubQ Before meals & nightly PRN 04/12/23 1320

## 2023-04-14 NOTE — ASSESSMENT & PLAN NOTE
Pt with ESRD on dialysis T/Th/S. Pt reports having not missed a dialysis session in the past week. Pt does not make urine     -nephrology consulted for dialysis, will appreciate recs  -HD completed on 4/11, unable to complete HD 4/13 due to abdominal pain-> completed on 4/14

## 2023-04-14 NOTE — NURSING
MD on call notified that pt c/o pain 10/10 and is requesting stronger pain meds. Also s/s parameters for novolog needed. HS . Per MD pt is not to receive any opiods and will update s/s.

## 2023-04-14 NOTE — ASSESSMENT & PLAN NOTE
- Presented with Mild DKA versus stress related hyperglycemia with unclear trigger; patient states adherence to insulin therapy with the exception of the morning of admission when she missed her morning Humalog  - High-risk of hypoglycemia given ESRD; has reportedly had recurring episodes of hypoglycemia at home  - Some elevated blood glucose levels overnight into this morning    Plan:   - Continue Levemir 6 units BID   - Continue Novolog to 4 units TIDAC  - Continue low dose sliding scale  - Hypoglycemia protocol PRN

## 2023-04-14 NOTE — ASSESSMENT & PLAN NOTE
Patient's FSGs are uncontrolled due to hyperglycemia on current medication regimen. B hydroxybutarate elevated to 0.9. Anion gap of 17. Initial glucose of 509   Last A1c reviewed-   Lab Results   Component Value Date    HGBA1C 5.8 03/03/2023     Most recent fingerstick glucose reviewed-   Recent Labs   Lab 04/13/23  1226 04/13/23  1730 04/13/23  2035 04/14/23  0846   POCTGLUCOSE 108 156* 220* 260*     Current correctional scale  Low  Maintain anti-hyperglycemic dose as follows-   Antihyperglycemics (From admission, onward)    Start     Stop Route Frequency Ordered    04/13/23 2100  insulin detemir U-100 (Levemir) pen 6 Units         -- SubQ 2 times daily 04/13/23 1341    04/12/23 1645  insulin aspart U-100 pen 4 Units         -- SubQ 3 times daily with meals 04/12/23 1321    04/12/23 1418  insulin aspart U-100 pen 0-5 Units         -- SubQ Before meals & nightly PRN 04/12/23 1320        Hold Oral hypoglycemics while patient is in the hospital.  -Endocrinology consulted, will appreciate recs

## 2023-04-14 NOTE — PLAN OF CARE
stated she will have to send a message to the gastro nurse and call patient back with scheduled G.I. appointment.

## 2023-04-14 NOTE — PLAN OF CARE
04/14/23 1046   Post-Acute Status   Post-Acute Authorization Other   Other Status No Post-Acute Service Needs      The patient is being d/c today with no social service needs identified at this time.       The SW will continue to follow.     Flora Mora LMSW  Case Management [facility]  Ext: [phone]

## 2023-04-16 LAB — PANC ISLET CELL IGG SER-ACNC: NORMAL

## 2023-04-17 ENCOUNTER — HOSPITAL ENCOUNTER (EMERGENCY)
Facility: HOSPITAL | Age: 33
Discharge: HOME | End: 2023-04-17
Attending: EMERGENCY MEDICINE
Payer: MEDICAID

## 2023-04-17 VITALS
HEART RATE: 86 BPM | SYSTOLIC BLOOD PRESSURE: 138 MMHG | RESPIRATION RATE: 16 BRPM | WEIGHT: 119 LBS | OXYGEN SATURATION: 92 % | DIASTOLIC BLOOD PRESSURE: 95 MMHG | HEIGHT: 62 IN | BODY MASS INDEX: 21.9 KG/M2 | TEMPERATURE: 98 F

## 2023-04-17 DIAGNOSIS — N18.6 END STAGE RENAL DISEASE ON DIALYSIS: ICD-10-CM

## 2023-04-17 DIAGNOSIS — R07.9 CHEST PAIN: ICD-10-CM

## 2023-04-17 DIAGNOSIS — Z91.199 NONCOMPLIANCE WITH TREATMENT: ICD-10-CM

## 2023-04-17 DIAGNOSIS — Z99.2 END STAGE RENAL DISEASE ON DIALYSIS: ICD-10-CM

## 2023-04-17 DIAGNOSIS — K31.84 GASTROPARESIS: Primary | ICD-10-CM

## 2023-04-17 LAB
ALBUMIN SERPL BCP-MCNC: 3.5 G/DL (ref 3.5–5.2)
ALP SERPL-CCNC: 317 U/L (ref 55–135)
ALT SERPL W/O P-5'-P-CCNC: 28 U/L (ref 10–44)
ANION GAP SERPL CALC-SCNC: 16 MMOL/L (ref 8–16)
AST SERPL-CCNC: 32 U/L (ref 10–40)
BASOPHILS # BLD AUTO: 0.02 K/UL (ref 0–0.2)
BASOPHILS NFR BLD: 0.4 % (ref 0–1.9)
BILIRUB SERPL-MCNC: 1 MG/DL (ref 0.1–1)
BNP SERPL-MCNC: 1693 PG/ML (ref 0–99)
BUN SERPL-MCNC: 35 MG/DL (ref 6–20)
CALCIUM SERPL-MCNC: 9.8 MG/DL (ref 8.7–10.5)
CHLORIDE SERPL-SCNC: 98 MMOL/L (ref 95–110)
CO2 SERPL-SCNC: 24 MMOL/L (ref 23–29)
CREAT SERPL-MCNC: 4.8 MG/DL (ref 0.5–1.4)
DIFFERENTIAL METHOD: ABNORMAL
EOSINOPHIL # BLD AUTO: 0.1 K/UL (ref 0–0.5)
EOSINOPHIL NFR BLD: 1.9 % (ref 0–8)
ERYTHROCYTE [DISTWIDTH] IN BLOOD BY AUTOMATED COUNT: 15.3 % (ref 11.5–14.5)
EST. GFR  (NO RACE VARIABLE): 11.5 ML/MIN/1.73 M^2
GAD65 AB SER-SCNC: 0.11 NMOL/L
GLUCOSE SERPL-MCNC: 97 MG/DL (ref 70–110)
HCT VFR BLD AUTO: 27.6 % (ref 37–48.5)
HGB BLD-MCNC: 9.7 G/DL (ref 12–16)
IMM GRANULOCYTES # BLD AUTO: 0.04 K/UL (ref 0–0.04)
IMM GRANULOCYTES NFR BLD AUTO: 0.7 % (ref 0–0.5)
LYMPHOCYTES # BLD AUTO: 0.7 K/UL (ref 1–4.8)
LYMPHOCYTES NFR BLD: 13.9 % (ref 18–48)
MAGNESIUM SERPL-MCNC: 2 MG/DL (ref 1.6–2.6)
MCH RBC QN AUTO: 30.2 PG (ref 27–31)
MCHC RBC AUTO-ENTMCNC: 35.1 G/DL (ref 32–36)
MCV RBC AUTO: 86 FL (ref 82–98)
MONOCYTES # BLD AUTO: 0.4 K/UL (ref 0.3–1)
MONOCYTES NFR BLD: 7.9 % (ref 4–15)
NEUTROPHILS # BLD AUTO: 4 K/UL (ref 1.8–7.7)
NEUTROPHILS NFR BLD: 75.2 % (ref 38–73)
NRBC BLD-RTO: 0 /100 WBC
PHOSPHATE SERPL-MCNC: 3.6 MG/DL (ref 2.7–4.5)
PLATELET # BLD AUTO: 109 K/UL (ref 150–450)
PMV BLD AUTO: 10.9 FL (ref 9.2–12.9)
POCT GLUCOSE: 93 MG/DL (ref 70–110)
POTASSIUM SERPL-SCNC: 5 MMOL/L (ref 3.5–5.1)
PROT SERPL-MCNC: 7.8 G/DL (ref 6–8.4)
RBC # BLD AUTO: 3.21 M/UL (ref 4–5.4)
SODIUM SERPL-SCNC: 138 MMOL/L (ref 136–145)
TROPONIN I SERPL DL<=0.01 NG/ML-MCNC: 0.05 NG/ML (ref 0–0.03)
WBC # BLD AUTO: 5.34 K/UL (ref 3.9–12.7)

## 2023-04-17 PROCEDURE — 93010 EKG 12-LEAD: ICD-10-PCS | Mod: ,,, | Performed by: INTERNAL MEDICINE

## 2023-04-17 PROCEDURE — 84484 ASSAY OF TROPONIN QUANT: CPT | Performed by: EMERGENCY MEDICINE

## 2023-04-17 PROCEDURE — 85025 COMPLETE CBC W/AUTO DIFF WBC: CPT | Performed by: EMERGENCY MEDICINE

## 2023-04-17 PROCEDURE — 99285 EMERGENCY DEPT VISIT HI MDM: CPT | Mod: 25

## 2023-04-17 PROCEDURE — 93010 ELECTROCARDIOGRAM REPORT: CPT | Mod: ,,, | Performed by: INTERNAL MEDICINE

## 2023-04-17 PROCEDURE — 84100 ASSAY OF PHOSPHORUS: CPT | Performed by: EMERGENCY MEDICINE

## 2023-04-17 PROCEDURE — 25000003 PHARM REV CODE 250: Performed by: EMERGENCY MEDICINE

## 2023-04-17 PROCEDURE — 63600175 PHARM REV CODE 636 W HCPCS: Performed by: EMERGENCY MEDICINE

## 2023-04-17 PROCEDURE — 96374 THER/PROPH/DIAG INJ IV PUSH: CPT

## 2023-04-17 PROCEDURE — 83880 ASSAY OF NATRIURETIC PEPTIDE: CPT | Performed by: EMERGENCY MEDICINE

## 2023-04-17 PROCEDURE — 83735 ASSAY OF MAGNESIUM: CPT | Performed by: EMERGENCY MEDICINE

## 2023-04-17 PROCEDURE — 96375 TX/PRO/DX INJ NEW DRUG ADDON: CPT

## 2023-04-17 PROCEDURE — 71045 X-RAY EXAM CHEST 1 VIEW: CPT | Mod: TC

## 2023-04-17 PROCEDURE — 71045 X-RAY EXAM CHEST 1 VIEW: CPT | Mod: 26,,, | Performed by: RADIOLOGY

## 2023-04-17 PROCEDURE — 93005 ELECTROCARDIOGRAM TRACING: CPT

## 2023-04-17 PROCEDURE — 82962 GLUCOSE BLOOD TEST: CPT

## 2023-04-17 PROCEDURE — 80053 COMPREHEN METABOLIC PANEL: CPT | Performed by: EMERGENCY MEDICINE

## 2023-04-17 PROCEDURE — 71045 XR CHEST AP PORTABLE: ICD-10-PCS | Mod: 26,,, | Performed by: RADIOLOGY

## 2023-04-17 RX ORDER — ASPIRIN 325 MG
325 TABLET ORAL
Status: COMPLETED | OUTPATIENT
Start: 2023-04-17 | End: 2023-04-17

## 2023-04-17 RX ORDER — MORPHINE SULFATE 2 MG/ML
4 INJECTION, SOLUTION INTRAMUSCULAR; INTRAVENOUS
Status: COMPLETED | OUTPATIENT
Start: 2023-04-17 | End: 2023-04-17

## 2023-04-17 RX ORDER — PROCHLORPERAZINE EDISYLATE 5 MG/ML
10 INJECTION INTRAMUSCULAR; INTRAVENOUS ONCE
Status: COMPLETED | OUTPATIENT
Start: 2023-04-17 | End: 2023-04-17

## 2023-04-17 RX ORDER — ONDANSETRON 2 MG/ML
4 INJECTION INTRAMUSCULAR; INTRAVENOUS
Status: COMPLETED | OUTPATIENT
Start: 2023-04-17 | End: 2023-04-17

## 2023-04-17 RX ORDER — NITROGLYCERIN 0.4 MG/1
0.4 TABLET SUBLINGUAL EVERY 5 MIN PRN
Status: DISCONTINUED | OUTPATIENT
Start: 2023-04-17 | End: 2023-04-17 | Stop reason: HOSPADM

## 2023-04-17 RX ORDER — HYDRALAZINE HYDROCHLORIDE 20 MG/ML
10 INJECTION INTRAMUSCULAR; INTRAVENOUS
Status: COMPLETED | OUTPATIENT
Start: 2023-04-17 | End: 2023-04-17

## 2023-04-17 RX ORDER — HALOPERIDOL 5 MG/ML
5 INJECTION INTRAMUSCULAR
Status: COMPLETED | OUTPATIENT
Start: 2023-04-17 | End: 2023-04-17

## 2023-04-17 RX ADMIN — HALOPERIDOL LACTATE 5 MG: 5 INJECTION, SOLUTION INTRAMUSCULAR at 10:04

## 2023-04-17 RX ADMIN — PROCHLORPERAZINE EDISYLATE 10 MG: 5 INJECTION INTRAMUSCULAR; INTRAVENOUS at 10:04

## 2023-04-17 RX ADMIN — NITROGLYCERIN 1 INCH: 20 OINTMENT TOPICAL at 09:04

## 2023-04-17 RX ADMIN — HYDRALAZINE HYDROCHLORIDE 10 MG: 20 INJECTION INTRAMUSCULAR; INTRAVENOUS at 09:04

## 2023-04-17 RX ADMIN — ASPIRIN 325 MG ORAL TABLET 325 MG: 325 PILL ORAL at 09:04

## 2023-04-17 RX ADMIN — ONDANSETRON 4 MG: 2 INJECTION INTRAMUSCULAR; INTRAVENOUS at 09:04

## 2023-04-17 RX ADMIN — MORPHINE SULFATE 4 MG: 2 INJECTION, SOLUTION INTRAMUSCULAR; INTRAVENOUS at 10:04

## 2023-04-17 NOTE — ED NOTES
"Pt continues to roll around yelling out that she is in continuous pain. Dr. Berman notified. Awaiting orders. Pt yelling at father stating,"Your not helping so just get out." Father exited room and told staff to call when her workup was back.   "

## 2023-04-17 NOTE — ED PROVIDER NOTES
Encounter Date: 2023       History     Chief Complaint   Patient presents with    Chest Pain     Pt reports midsternal chest pain non radiating 10/10 starting this am.      33-year-old female, history of end-stage renal disease, on dialysis, as well as gastroparesis, heart failure, and hypertension, here from home complaining of abdominal pain, chest pain, nausea, and vomiting.  Symptoms started this morning upon awakening.  She is not tried any medications for her symptoms.  She states she has missed her last 2 dialysis sessions.  Patient states she has centralized chest pain which radiates to her back.  She denies any fever or chills.  Denies shortness of breath or cough.  Nothing she does makes her symptoms any better or worse.    Review of patient's allergies indicates:   Allergen Reactions    Penicillins Hives     Past Medical History:   Diagnosis Date    ESRD on hemodialysis 2022    Gastritis 2022    EGD was 21    Gastroparesis 2022    has not had Emptying study    Heart failure with preserved ejection fraction 2022    EF 55% on 3/22    History of supraventricular tachycardia     Hyperkalemia 2022    Hypertensive emergency 2022    Sickle cell trait 2022    Type 2 diabetes mellitus      Past Surgical History:   Procedure Laterality Date     SECTION      x 3    COLONOSCOPY      COLONOSCOPY N/A 2022    Procedure: COLONOSCOPY;  Surgeon: Jagdeep Cedeno MD;  Location: [facility];  Service: Endoscopy;  Laterality: N/A;    ESOPHAGOGASTRODUODENOSCOPY N/A 10/18/2019    Procedure: ESOPHAGOGASTRODUODENOSCOPY (EGD);  Surgeon: Gianluca Mendez MD;  Location: [facility];  Service: Endoscopy;  Laterality: N/A;    ESOPHAGOGASTRODUODENOSCOPY N/A 2022    Procedure: EGD (ESOPHAGOGASTRODUODENOSCOPY);  Surgeon: Micky Paredes III, MD;  Location: [facility];  Service: Endoscopy;  Laterality: N/A;    ESOPHAGOGASTRODUODENOSCOPY N/A 2022    Procedure: EGD  (ESOPHAGOGASTRODUODENOSCOPY);  Surgeon: Marcelo Zhong MD;  Location: [facility] ENDO;  Service: Endoscopy;  Laterality: N/A;    LAPAROSCOPIC CHOLECYSTECTOMY N/A 07/30/2022    Procedure: CHOLECYSTECTOMY, LAPAROSCOPIC;  Surgeon: Grey Perez MD;  Location: [facility] OR;  Service: General;  Laterality: N/A;    PLACEMENT OF DUAL-LUMEN VASCULAR CATHETER Left 07/12/2022    Procedure: INSERTION-CATHETER-JOSEPH;  Surgeon: Dionte Gan MD;  Location: [facility] OR;  Service: General;  Laterality: Left;    PLACEMENT OF DUAL-LUMEN VASCULAR CATHETER Right 07/26/2022    Procedure: INSERTION-CATHETER-Hemosplit;  Surgeon: Dionte Gan MD;  Location: [facility] OR;  Service: General;  Laterality: Right;     Family History   Problem Relation Age of Onset    Diabetes Mother     Diabetes Father      Social History     Tobacco Use    Smoking status: Never    Smokeless tobacco: Never   Substance Use Topics    Alcohol use: Not Currently    Drug use: No     Review of Systems   Constitutional:  Negative for chills and fever.   HENT: Negative.     Eyes: Negative.    Respiratory: Negative.  Negative for shortness of breath.    Cardiovascular:  Positive for chest pain.   Gastrointestinal:  Positive for abdominal pain, nausea and vomiting. Negative for diarrhea.   Endocrine: Negative.    Genitourinary: Negative.    Musculoskeletal:  Positive for back pain. Negative for neck pain and neck stiffness.   Skin: Negative.    Neurological: Negative.    Hematological: Negative.    Psychiatric/Behavioral: Negative.       Physical Exam     Initial Vitals   BP Pulse Resp Temp SpO2   04/17/23 0946 04/17/23 0946 04/17/23 0938 04/17/23 0946 04/17/23 0946   (!) 172/120 102 (!) (P) 28 98.2 °F (36.8 °C) 100 %      MAP       --                Physical Exam    Nursing note and vitals reviewed.  Constitutional: She appears well-developed and well-nourished. She appears distressed.   Patient is writhing in the bed, stating that she is in intense pain.  She sounds as if  she is crying, but she produces no tears.  No active vomiting.   HENT:   Head: Normocephalic and atraumatic.   Nose: Nose normal.   Mouth/Throat: Oropharynx is clear and moist. No oropharyngeal exudate.   Eyes: Conjunctivae and EOM are normal. Pupils are equal, round, and reactive to light. No scleral icterus.   Neck: Neck supple. No JVD present.   Normal range of motion.  Cardiovascular:  Normal rate, regular rhythm, normal heart sounds and intact distal pulses.           No murmur heard.  Pulmonary/Chest: Breath sounds normal. No stridor. No respiratory distress. She has no wheezes. She has no rhonchi. She has no rales.   Abdominal: Abdomen is soft. Bowel sounds are normal. She exhibits no distension. There is no abdominal tenderness. There is no rebound and no guarding.   Musculoskeletal:         General: No tenderness or edema. Normal range of motion.      Cervical back: Normal range of motion and neck supple.     Neurological: She is alert and oriented to person, place, and time. She has normal strength. No cranial nerve deficit or sensory deficit. GCS score is 15. GCS eye subscore is 4. GCS verbal subscore is 5. GCS motor subscore is 6.   Skin: Skin is warm and dry. Capillary refill takes less than 2 seconds. No rash noted. No erythema.   Psychiatric: She has a normal mood and affect. Her behavior is normal.       ED Course   Procedures  Labs Reviewed   CBC W/ AUTO DIFFERENTIAL - Abnormal; Notable for the following components:       Result Value    RBC 3.21 (*)     Hemoglobin 9.7 (*)     Hematocrit 27.6 (*)     RDW 15.3 (*)     Platelets 109 (*)     Immature Granulocytes 0.7 (*)     Lymph # 0.7 (*)     Gran % 75.2 (*)     Lymph % 13.9 (*)     All other components within normal limits   COMPREHENSIVE METABOLIC PANEL - Abnormal; Notable for the following components:    BUN 35 (*)     Creatinine 4.8 (*)     Alkaline Phosphatase 317 (*)     eGFR 11.5 (*)     All other components within normal limits   TROPONIN I -  Abnormal; Notable for the following components:    Troponin I 0.046 (*)     All other components within normal limits   B-TYPE NATRIURETIC PEPTIDE - Abnormal; Notable for the following components:    BNP 1,693 (*)     All other components within normal limits   MAGNESIUM   PHOSPHORUS   POCT GLUCOSE     EKG Readings: (Independently Interpreted)   EKG personally reviewed by me shows sinus tachycardia at 103 beats per minute.  Left atrial enlargement.  Left axis.  CO interval 178, .  No obvious ischemic changes.     Imaging Results              X-Ray Chest AP Portable (Final result)  Result time 04/17/23 11:11:49      Final result by Thanh Davila MD (04/17/23 11:11:49)                   Impression:      No definite acute cardiopulmonary disease in this patient with chronic cardiomegaly and a dual lumen central venous dialysis catheter in place.      Electronically signed by: Thanh Davila MD  Date:    04/17/2023  Time:    11:11               Narrative:    EXAMINATION:  XR CHEST AP PORTABLE    CLINICAL HISTORY:  Chest Pain;    TECHNIQUE:  Single frontal view of the chest was performed.    COMPARISON:  April 10, 2023    FINDINGS:  There is moderate enlargement of the cardiac silhouette.  A right internal jugular dual lumen central venous dialysis catheter is in place.  No confluent infiltrates are seen.  The bony structures are unremarkable.                                    X-Rays:   Independently Interpreted Readings:   Other Readings:  Chest x-ray personally reviewed by me shows clear lungs bilaterally.  No infiltrate, no effusion, no edema, no pneumothorax.  Cardiomegaly, dialysis catheter in place right chest.  Normal skeletal structures.  Medications   nitroGLYCERIN SL tablet 0.4 mg (has no administration in time range)   aspirin tablet 325 mg (325 mg Oral Given 4/17/23 0959)   nitroGLYCERIN 2% TD oint ointment 1 inch (1 inch Topical (Top) Given 4/17/23 0959)   ondansetron injection 4 mg (4 mg  Intravenous Given 4/17/23 0959)   hydrALAZINE injection 10 mg (10 mg Intravenous Given 4/17/23 0959)   haloperidol lactate injection 5 mg (5 mg Intravenous Given 4/17/23 1027)   prochlorperazine injection Soln 10 mg (10 mg Intravenous Given 4/17/23 1028)   morphine injection 4 mg (4 mg Intravenous Given 4/17/23 1028)     Medical Decision Making:   Differential Diagnosis:   Myocardial infarction, myocardial ischemia, congestive heart failure, pneumonia, pneumothorax, gastroparesis, electrolyte abnormality, drug-seeking behavior, etc.  ED Management:  Labs showed relatively normal CBC, without evidence of an infectious process.  Troponin was elevated, but well below her baseline.  Not unusual given her status as an end-stage renal disease patient on dialysis.  BNP was also elevated, again, below her baseline.  Electrolytes are good, with normal magnesium, phosphorus, and potassium.  Chest x-ray showed no evidence of pneumonia, pneumothorax, or congestive heart failure.  EKG showed no ischemic changes.  Patient was hypertensive upon arrival here, so she was given sublingual nitroglycerin as well as nitroglycerin paste.  She was given 325 mg aspirin, she was also given 4 mg Zofran, 10 mg hydralazine, 5 mg Haldol, 10 mg Compazine, and 4 mg morphine.  Patient is now asymptomatic.  She did get somewhat hypoxic secondary to the medications, and she required 2 L via nasal cannula for short term to keep her sats in limits.  I believe her symptoms this morning were caused by gastroparesis.  Do not suspect myocardial ischemia or infarction.  I believe patient is safe for discharge home.  She is scheduled for dialysis tomorrow, and I do not believe she needs urgent or emergent dialysis today.  Patient was told she definitely needs to keep her appointment for dialysis tomorrow.  Continue previously prescribed medications, follow-up with PCP.  Return here as needed or if worse in any way.                        Clinical  Impression:   Final diagnoses:  [R07.9] Chest pain  [K31.84] Gastroparesis (Primary)  [N18.6, Z99.2] End stage renal disease on dialysis  [Z91.199] Noncompliance with treatment        ED Disposition Condition    Discharge Stable          ED Prescriptions    None       Follow-up Information       Follow up With Specialties Details Why Contact Info    Melony Kim NP Nurse Practitioner Call today  [address] [place] [id number]  [phone]      Your nephrologist  Call today               Ad Berman MD  04/17/23 3864

## 2023-04-17 NOTE — DISCHARGE INSTRUCTIONS
Continue previously prescribed medications and treatments.  Follow-up with your primary care provider, and also call your nephrologist and your dialysis center.  Do not miss dialysis.  Return here as needed or if worse in any way.   September 7, 2017      Stephon Cates MD  [address]  [address] [address]  Phone: [phone]          Patient: Lorena Vallejo   MR Number: [id number]   YOB: 1934   Date of Visit: 9/5/2017       Dear Dr. Stephon Cates:    Thank you for referring Lorena Vallejo to me for evaluation. Attached you will find relevant portions of my assessment and plan of care.    If you have questions, please do not hesitate to call me. I look forward to following Lorena Vallejo along with you.    Sincerely,    Juanito Ivory MD    Enclosure  CC:  No Recipients    If you would like to receive this communication electronically, please contact [email or web address] or [phone] to request more information on [employer] Link access.    For providers and/or their staff who would like to refer a patient to [facility], please contact us through our one-stop-shop provider referral line, [facility], at [phone].    If you feel you have received this communication in error or would no longer like to receive these types of communications, please e-mail [email or web address]

## 2023-04-17 NOTE — ED NOTES
Pt placed on 3 liters nc post pain medication. Pt informed to take deep breathes through her nose when she hears the alarm. Pt v/u. Pt in no distress reporting to be pain free.

## 2023-04-17 NOTE — ED NOTES
Pt placed back on 3 liters nc. Pt still in a sedated state post medication admin. Pt easily arousible on command.

## 2023-04-21 ENCOUNTER — HOSPITAL ENCOUNTER (EMERGENCY)
Facility: HOSPITAL | Age: 33
Discharge: HOME | End: 2023-04-21
Attending: EMERGENCY MEDICINE
Payer: MEDICAID

## 2023-04-21 VITALS
HEIGHT: 62 IN | HEART RATE: 97 BPM | BODY MASS INDEX: 21.9 KG/M2 | SYSTOLIC BLOOD PRESSURE: 184 MMHG | OXYGEN SATURATION: 94 % | WEIGHT: 119 LBS | DIASTOLIC BLOOD PRESSURE: 120 MMHG | RESPIRATION RATE: 15 BRPM | TEMPERATURE: 98 F

## 2023-04-21 DIAGNOSIS — R10.84 GENERALIZED ABDOMINAL PAIN: Primary | ICD-10-CM

## 2023-04-21 LAB
ALBUMIN SERPL BCP-MCNC: 3.9 G/DL (ref 3.5–5.2)
ALP SERPL-CCNC: 464 U/L (ref 55–135)
ALT SERPL W/O P-5'-P-CCNC: 65 U/L (ref 10–44)
ANION GAP SERPL CALC-SCNC: 20 MMOL/L (ref 8–16)
AST SERPL-CCNC: 71 U/L (ref 10–40)
BASOPHILS # BLD AUTO: 0.03 K/UL (ref 0–0.2)
BASOPHILS NFR BLD: 0.6 % (ref 0–1.9)
BILIRUB SERPL-MCNC: 1.4 MG/DL (ref 0.1–1)
BUN SERPL-MCNC: 30 MG/DL (ref 6–20)
CALCIUM SERPL-MCNC: 10.2 MG/DL (ref 8.7–10.5)
CHLORIDE SERPL-SCNC: 98 MMOL/L (ref 95–110)
CO2 SERPL-SCNC: 22 MMOL/L (ref 23–29)
CREAT SERPL-MCNC: 3.9 MG/DL (ref 0.5–1.4)
DIFFERENTIAL METHOD: ABNORMAL
EOSINOPHIL # BLD AUTO: 0.1 K/UL (ref 0–0.5)
EOSINOPHIL NFR BLD: 1.1 % (ref 0–8)
ERYTHROCYTE [DISTWIDTH] IN BLOOD BY AUTOMATED COUNT: 15.4 % (ref 11.5–14.5)
EST. GFR  (NO RACE VARIABLE): 14.8 ML/MIN/1.73 M^2
GLUCOSE SERPL-MCNC: 278 MG/DL (ref 70–110)
HCT VFR BLD AUTO: 24.9 % (ref 37–48.5)
HGB BLD-MCNC: 8.6 G/DL (ref 12–16)
IMM GRANULOCYTES # BLD AUTO: 0.02 K/UL (ref 0–0.04)
IMM GRANULOCYTES NFR BLD AUTO: 0.4 % (ref 0–0.5)
LIPASE SERPL-CCNC: 42 U/L (ref 4–60)
LYMPHOCYTES # BLD AUTO: 0.6 K/UL (ref 1–4.8)
LYMPHOCYTES NFR BLD: 11.5 % (ref 18–48)
MCH RBC QN AUTO: 30.1 PG (ref 27–31)
MCHC RBC AUTO-ENTMCNC: 34.5 G/DL (ref 32–36)
MCV RBC AUTO: 87 FL (ref 82–98)
MONOCYTES # BLD AUTO: 0.5 K/UL (ref 0.3–1)
MONOCYTES NFR BLD: 8.5 % (ref 4–15)
NEUTROPHILS # BLD AUTO: 4.1 K/UL (ref 1.8–7.7)
NEUTROPHILS NFR BLD: 77.9 % (ref 38–73)
NRBC BLD-RTO: 0 /100 WBC
PLATELET # BLD AUTO: 108 K/UL (ref 150–450)
PMV BLD AUTO: 10.2 FL (ref 9.2–12.9)
POTASSIUM SERPL-SCNC: 5.3 MMOL/L (ref 3.5–5.1)
PROT SERPL-MCNC: 9 G/DL (ref 6–8.4)
RBC # BLD AUTO: 2.86 M/UL (ref 4–5.4)
SODIUM SERPL-SCNC: 140 MMOL/L (ref 136–145)
WBC # BLD AUTO: 5.3 K/UL (ref 3.9–12.7)

## 2023-04-21 PROCEDURE — 74176 CT ABD & PELVIS W/O CONTRAST: CPT | Mod: 26,,, | Performed by: RADIOLOGY

## 2023-04-21 PROCEDURE — 25000003 PHARM REV CODE 250: Performed by: EMERGENCY MEDICINE

## 2023-04-21 PROCEDURE — 74176 CT ABDOMEN PELVIS WITHOUT CONTRAST: ICD-10-PCS | Mod: 26,,, | Performed by: RADIOLOGY

## 2023-04-21 PROCEDURE — 80053 COMPREHEN METABOLIC PANEL: CPT | Performed by: EMERGENCY MEDICINE

## 2023-04-21 PROCEDURE — 96374 THER/PROPH/DIAG INJ IV PUSH: CPT

## 2023-04-21 PROCEDURE — 99285 EMERGENCY DEPT VISIT HI MDM: CPT | Mod: 25

## 2023-04-21 PROCEDURE — 85025 COMPLETE CBC W/AUTO DIFF WBC: CPT | Performed by: EMERGENCY MEDICINE

## 2023-04-21 PROCEDURE — 96376 TX/PRO/DX INJ SAME DRUG ADON: CPT

## 2023-04-21 PROCEDURE — 96372 THER/PROPH/DIAG INJ SC/IM: CPT | Performed by: EMERGENCY MEDICINE

## 2023-04-21 PROCEDURE — 63600175 PHARM REV CODE 636 W HCPCS: Performed by: EMERGENCY MEDICINE

## 2023-04-21 PROCEDURE — 96375 TX/PRO/DX INJ NEW DRUG ADDON: CPT

## 2023-04-21 PROCEDURE — 83690 ASSAY OF LIPASE: CPT | Performed by: EMERGENCY MEDICINE

## 2023-04-21 PROCEDURE — 74176 CT ABD & PELVIS W/O CONTRAST: CPT | Mod: TC

## 2023-04-21 PROCEDURE — C9113 INJ PANTOPRAZOLE SODIUM, VIA: HCPCS | Performed by: EMERGENCY MEDICINE

## 2023-04-21 RX ORDER — PANTOPRAZOLE SODIUM 40 MG/10ML
40 INJECTION, POWDER, LYOPHILIZED, FOR SOLUTION INTRAVENOUS
Status: COMPLETED | OUTPATIENT
Start: 2023-04-21 | End: 2023-04-21

## 2023-04-21 RX ORDER — HYDRALAZINE HYDROCHLORIDE 20 MG/ML
20 INJECTION INTRAMUSCULAR; INTRAVENOUS
Status: COMPLETED | OUTPATIENT
Start: 2023-04-21 | End: 2023-04-21

## 2023-04-21 RX ORDER — CLONIDINE HYDROCHLORIDE 0.1 MG/1
0.1 TABLET ORAL
Status: COMPLETED | OUTPATIENT
Start: 2023-04-21 | End: 2023-04-21

## 2023-04-21 RX ORDER — HYDRALAZINE HYDROCHLORIDE 20 MG/ML
10 INJECTION INTRAMUSCULAR; INTRAVENOUS
Status: COMPLETED | OUTPATIENT
Start: 2023-04-21 | End: 2023-04-21

## 2023-04-21 RX ORDER — HYDROMORPHONE HYDROCHLORIDE 1 MG/ML
1 INJECTION, SOLUTION INTRAMUSCULAR; INTRAVENOUS; SUBCUTANEOUS
Status: COMPLETED | OUTPATIENT
Start: 2023-04-21 | End: 2023-04-21

## 2023-04-21 RX ORDER — CLONIDINE HYDROCHLORIDE 0.1 MG/1
0.2 TABLET ORAL
Status: COMPLETED | OUTPATIENT
Start: 2023-04-21 | End: 2023-04-21

## 2023-04-21 RX ORDER — METOCLOPRAMIDE HYDROCHLORIDE 5 MG/ML
5 INJECTION INTRAMUSCULAR; INTRAVENOUS
Status: COMPLETED | OUTPATIENT
Start: 2023-04-21 | End: 2023-04-21

## 2023-04-21 RX ORDER — HALOPERIDOL 5 MG/ML
2 INJECTION INTRAMUSCULAR
Status: COMPLETED | OUTPATIENT
Start: 2023-04-21 | End: 2023-04-21

## 2023-04-21 RX ORDER — LABETALOL HYDROCHLORIDE 5 MG/ML
10 INJECTION, SOLUTION INTRAVENOUS
Status: COMPLETED | OUTPATIENT
Start: 2023-04-21 | End: 2023-04-21

## 2023-04-21 RX ORDER — KETOROLAC TROMETHAMINE 30 MG/ML
15 INJECTION, SOLUTION INTRAMUSCULAR; INTRAVENOUS
Status: COMPLETED | OUTPATIENT
Start: 2023-04-21 | End: 2023-04-21

## 2023-04-21 RX ORDER — NALOXONE HCL 0.4 MG/ML
0.4 VIAL (ML) INJECTION
Status: COMPLETED | OUTPATIENT
Start: 2023-04-21 | End: 2023-04-21

## 2023-04-21 RX ORDER — FLUMAZENIL 0.1 MG/ML
0.2 INJECTION INTRAVENOUS ONCE
Status: COMPLETED | OUTPATIENT
Start: 2023-04-21 | End: 2023-04-21

## 2023-04-21 RX ORDER — LORAZEPAM 2 MG/ML
2 INJECTION INTRAMUSCULAR
Status: COMPLETED | OUTPATIENT
Start: 2023-04-21 | End: 2023-04-21

## 2023-04-21 RX ADMIN — KETOROLAC TROMETHAMINE 15 MG: 30 INJECTION, SOLUTION INTRAMUSCULAR; INTRAVENOUS at 03:04

## 2023-04-21 RX ADMIN — LABETALOL HYDROCHLORIDE 10 MG: 5 INJECTION, SOLUTION INTRAVENOUS at 05:04

## 2023-04-21 RX ADMIN — HYDROMORPHONE HYDROCHLORIDE 1 MG: 1 INJECTION, SOLUTION INTRAMUSCULAR; INTRAVENOUS; SUBCUTANEOUS at 04:04

## 2023-04-21 RX ADMIN — HYDRALAZINE HYDROCHLORIDE 10 MG: 20 INJECTION INTRAMUSCULAR; INTRAVENOUS at 07:04

## 2023-04-21 RX ADMIN — PANTOPRAZOLE SODIUM 40 MG: 40 INJECTION, POWDER, LYOPHILIZED, FOR SOLUTION INTRAVENOUS at 04:04

## 2023-04-21 RX ADMIN — LORAZEPAM 2 MG: 2 INJECTION INTRAMUSCULAR; INTRAVENOUS at 04:04

## 2023-04-21 RX ADMIN — HYDRALAZINE HYDROCHLORIDE 20 MG: 20 INJECTION INTRAMUSCULAR; INTRAVENOUS at 01:04

## 2023-04-21 RX ADMIN — CLONIDINE HYDROCHLORIDE 0.2 MG: 0.1 TABLET ORAL at 01:04

## 2023-04-21 RX ADMIN — HALOPERIDOL LACTATE 2 MG: 5 INJECTION, SOLUTION INTRAMUSCULAR at 07:04

## 2023-04-21 RX ADMIN — FLUMAZENIL 0.2 MG: 0.1 INJECTION INTRAVENOUS at 05:04

## 2023-04-21 RX ADMIN — NALOXONE HYDROCHLORIDE 0.4 MG: 0.4 INJECTION, SOLUTION INTRAMUSCULAR; INTRAVENOUS; SUBCUTANEOUS at 03:04

## 2023-04-21 RX ADMIN — CLONIDINE HYDROCHLORIDE 0.1 MG: 0.1 TABLET ORAL at 05:04

## 2023-04-21 RX ADMIN — METOCLOPRAMIDE 5 MG: 5 INJECTION, SOLUTION INTRAMUSCULAR; INTRAVENOUS at 07:04

## 2023-04-21 NOTE — ED NOTES
Pt o2 saturation on room air 85%. Pt placed back on 3L nasal cannula. Current o2 saturation 96%. Pt also requesting pain medication. MD notified

## 2023-04-21 NOTE — ED NOTES
Assumed care of this pt. Pt resting in bed with eyes closed. Respirations even and unlabored. Chest rising and falling. In no acute distress. Remains on continuous cardiac monitoring and pulse ox

## 2023-04-21 NOTE — ED PROVIDER NOTES
Encounter Date: 2023       History     Chief Complaint   Patient presents with    Abdominal Pain     Since 10pm tonight. Pt advised nausea, vomiting, abdominal pain, and back pain.      Pt with ESRD on HD, gastroparesis, gastritis and DM here again with abd pain, back pain and NV onset at 2200 last night. Pt well known to this ED for her numerous visits for same. Pain 10/10. Last HD was yesterday. No hematemesis or melena.     The history is provided by the patient.   Abdominal Pain  The current episode started several hours ago. The abdominal pain is located in the epigastric region. The abdominal pain radiates to the back. The severity of the abdominal pain is 10/10. The abdominal pain is relieved by nothing. The other symptoms of the illness include nausea and vomiting. The other symptoms of the illness do not include fever, jaundice, melena, diarrhea, dysuria, hematemesis, hematochezia, vaginal discharge or vaginal bleeding.   Additional symptoms associated with the illness include anorexia and back pain. Symptoms associated with the illness do not include chills, diaphoresis, heartburn, constipation, urgency, hematuria or frequency.   Review of patient's allergies indicates:   Allergen Reactions    Penicillins Hives     Past Medical History:   Diagnosis Date    ESRD on hemodialysis 2022    Gastritis 2022    EGD was 21    Gastroparesis 2022    has not had Emptying study    Heart failure with preserved ejection fraction 2022    EF 55% on 3/22    History of supraventricular tachycardia     Hyperkalemia 2022    Hypertensive emergency 2022    Sickle cell trait 2022    Type 2 diabetes mellitus      Past Surgical History:   Procedure Laterality Date     SECTION      x 3    COLONOSCOPY      COLONOSCOPY N/A 2022    Procedure: COLONOSCOPY;  Surgeon: Jagdeep Cedeno MD;  Location: [facility];  Service: Endoscopy;  Laterality: N/A;    ESOPHAGOGASTRODUODENOSCOPY  N/A 10/18/2019    Procedure: ESOPHAGOGASTRODUODENOSCOPY (EGD);  Surgeon: Gianluca Mendez MD;  Location: [facility] ENDO;  Service: Endoscopy;  Laterality: N/A;    ESOPHAGOGASTRODUODENOSCOPY N/A 08/24/2022    Procedure: EGD (ESOPHAGOGASTRODUODENOSCOPY);  Surgeon: Micky Paredes III, MD;  Location: [facility] ENDO;  Service: Endoscopy;  Laterality: N/A;    ESOPHAGOGASTRODUODENOSCOPY N/A 12/5/2022    Procedure: EGD (ESOPHAGOGASTRODUODENOSCOPY);  Surgeon: Marcelo Zhong MD;  Location: [facility] ENDO;  Service: Endoscopy;  Laterality: N/A;    LAPAROSCOPIC CHOLECYSTECTOMY N/A 07/30/2022    Procedure: CHOLECYSTECTOMY, LAPAROSCOPIC;  Surgeon: Grey Perez MD;  Location: [facility] OR;  Service: General;  Laterality: N/A;    PLACEMENT OF DUAL-LUMEN VASCULAR CATHETER Left 07/12/2022    Procedure: INSERTION-CATHETER-JOSEPH;  Surgeon: Dionte Gan MD;  Location: [facility] OR;  Service: General;  Laterality: Left;    PLACEMENT OF DUAL-LUMEN VASCULAR CATHETER Right 07/26/2022    Procedure: INSERTION-CATHETER-Hemosplit;  Surgeon: Dionte Gan MD;  Location: [facility] OR;  Service: General;  Laterality: Right;     Family History   Problem Relation Age of Onset    Diabetes Mother     Diabetes Father      Social History     Tobacco Use    Smoking status: Never    Smokeless tobacco: Never   Substance Use Topics    Alcohol use: Not Currently    Drug use: No     Review of Systems   Constitutional:  Negative for chills, diaphoresis and fever.   Gastrointestinal:  Positive for abdominal pain, anorexia, nausea and vomiting. Negative for constipation, diarrhea, heartburn, hematemesis, hematochezia, jaundice and melena.   Genitourinary:  Negative for dysuria, frequency, hematuria, urgency, vaginal bleeding and vaginal discharge.   Musculoskeletal:  Positive for back pain.   All other systems reviewed and are negative.    Physical Exam     Initial Vitals [04/21/23 0419]   BP Pulse Resp Temp SpO2   (!) 179/112 103 (!) 22 98.2 °F (36.8 °C) 98 %      MAP        --         Physical Exam    Nursing note and vitals reviewed.  Constitutional: She appears well-developed and well-nourished. She is not diaphoretic. She appears distressed.   Writhing in pain, histrionic   HENT:   Mouth/Throat: Oropharynx is clear and moist.   Eyes: Conjunctivae and EOM are normal. No scleral icterus.   Neck: Neck supple. No JVD present.   Normal range of motion.  Cardiovascular:  Regular rhythm, normal heart sounds and intact distal pulses.           tachy   Pulmonary/Chest: Breath sounds normal.   Abdominal: Abdomen is soft. Bowel sounds are normal. She exhibits no distension and no mass. There is abdominal tenderness.   No CVAT There is no rebound and no guarding.   Musculoskeletal:         General: No tenderness or edema. Normal range of motion.      Cervical back: Normal range of motion and neck supple.     Neurological: She is alert and oriented to person, place, and time. GCS score is 15. GCS eye subscore is 4. GCS verbal subscore is 5. GCS motor subscore is 6.   Skin: Skin is warm and dry. Capillary refill takes less than 2 seconds. No rash noted. No erythema. No pallor.   Psychiatric:   Very anxious       ED Course   Procedures  Labs Reviewed   CBC W/ AUTO DIFFERENTIAL - Abnormal; Notable for the following components:       Result Value    RBC 2.86 (*)     Hemoglobin 8.6 (*)     Hematocrit 24.9 (*)     RDW 15.4 (*)     Platelets 108 (*)     Lymph # 0.6 (*)     Gran % 77.9 (*)     Lymph % 11.5 (*)     All other components within normal limits   COMPREHENSIVE METABOLIC PANEL - Abnormal; Notable for the following components:    Potassium 5.3 (*)     CO2 22 (*)     Glucose 278 (*)     BUN 30 (*)     Creatinine 3.9 (*)     Total Protein 9.0 (*)     Total Bilirubin 1.4 (*)     Alkaline Phosphatase 464 (*)     AST 71 (*)     ALT 65 (*)     Anion Gap 20 (*)     eGFR 14.8 (*)     All other components within normal limits   LIPASE          Imaging Results              CT Abdomen Pelvis  Without  Contrast (Final result)  Result time 04/21/23 08:47:03      Final result by Kaushik Gutierrez MD (04/21/23 08:47:03)                   Impression:      1. Urinary bladder wall thickening is nonspecific.  Cystitis and outlet obstruction are 2 considerations.  2. Colonic constipation is possible.  3. Hepatosplenomegaly.  4. Cardiomegaly with pericardial effusion.  5. Bibasilar airspace disease could reflect edema or multifocal pneumonia.      Electronically signed by: Kaushik Gutierrez  Date:    04/21/2023  Time:    08:47               Narrative:    EXAMINATION:  CT ABDOMEN PELVIS WITHOUT CONTRAST    CLINICAL HISTORY:  Abdominal pain, acute, nonlocalized;    TECHNIQUE:  Low dose axial images, sagittal and coronal reformations were obtained from the lung bases to the pubic symphysis.  Oral contrast was not administered.    COMPARISON:  04/10/2023    FINDINGS:  There is patchy airspace disease and some interlobular septal thickening in the lung bases heart is enlarged and there is also pericardial fluid.  The tip a venous access catheter is present in the right atrium.  Cholecystectomy.    Liver is 20 cm in length and spleen is 14 cm in length.  Remaining solid abdominal organs are grossly unremarkable on this noncontrast exam.    There is no enteric contrast which limits bowel assessment.  Stomach is mildly distended.  Moderate degree of stool throughout the colon.    Extensive atherosclerosis.  Unremarkable noncontrast uterus.  There is mild diffuse urinary bladder wall thickening.    No acute osseous findings.                                       Medications   LORazepam injection 2 mg (2 mg Intravenous Given 4/21/23 0045)   pantoprazole injection 40 mg (40 mg Intravenous Given 4/21/23 4144)   HYDROmorphone injection 1 mg (1 mg Intramuscular Given 4/21/23 0187)   flumazeniL injection 0.2 mg (0.2 mg Intravenous Given 4/21/23 0421)   labetaloL injection 10 mg (10 mg Intravenous Given 4/21/23 0069)   cloNIDine tablet  0.1 mg (0.1 mg Oral Given 4/21/23 0552)   hydrALAZINE injection 10 mg (10 mg Intravenous Given 4/21/23 0713)   haloperidol lactate injection 2 mg (2 mg Intravenous Given 4/21/23 0756)   metoclopramide HCl injection 5 mg (5 mg Intravenous Given 4/21/23 0758)   hydrALAZINE injection 20 mg (20 mg Intravenous Given 4/21/23 1306)   cloNIDine tablet 0.2 mg (0.2 mg Oral Given 4/21/23 1351)   ketorolac injection 15 mg (15 mg Intravenous Given 4/21/23 1513)   naloxone 0.4 mg/mL injection 0.4 mg (0.4 mg Intravenous Given 4/21/23 1515)     Medical Decision Making:   Differential Diagnosis:   Gastroparesis, gastritis, pancreatitis, drug seeking  Clinical Tests:   Lab Tests: Ordered and Reviewed  Radiological Study: Ordered  ED Management:  Pt presented with her abd pain and back pain and NV. Well known to me for her numerous visits for same. She was given dilaudid, ativan and protonix with some improvement in her symptoms. CBC with her chronic anemia, normal WBC. CMP showed elevated Bun/Cr which is chronic, slightl elevated K, LFTs and AG. Pt not in DKA. CT abd/pelvis ordered given her level of pain. Signed out to Dr Berman at end of shift to f/u the CT and dispo. Her blood pressure is chronically elevated but will need treatment since she is not taking her meds dur to her nausea and pain. Next HD on Sat am.            ED Course as of 05/05/23 0004   Fri Apr 21, 2023   0511 Pt calm and more comfortable now. [DC]   0513 Pt's BP is elevated again. I saw pt for same back in Feb and she had neg CTA then. I do not suspect aortic injury in this patient. [DC]   0539 Hypoxic from ativan. O2 placed. Flumazenil given.  [DC]      ED Course User Index  [DC] Gianluca Parra Jr., MD                 Clinical Impression:   Final diagnoses:  [R10.84] Generalized abdominal pain (Primary)        ED Disposition Condition    Discharge Stable          ED Prescriptions    None       Follow-up Information       Follow up With Specialties Details Why  Contact Info    Melony Kim, CARO Nurse Practitioner Call today  [address]  [facility] [address]  [phone]      [facility] Emergency Dept Emergency Medicine  As needed, If symptoms worsen [address] [address] [phone]             Ad Berman MD  05/05/23 0004

## 2023-04-21 NOTE — DISCHARGE INSTRUCTIONS
Take medications as prescribed.  Keep your dialysis appointment tomorrow.  Call your primary care provider when you leave here today, and return here as needed or if worse in any way.

## 2023-04-22 ENCOUNTER — HOSPITAL ENCOUNTER (OUTPATIENT)
Facility: HOSPITAL | Age: 33
Discharge: HOME | End: 2023-04-25
Attending: EMERGENCY MEDICINE | Admitting: INTERNAL MEDICINE
Payer: MEDICAID

## 2023-04-22 ENCOUNTER — HOSPITAL ENCOUNTER (EMERGENCY)
Facility: HOSPITAL | Age: 33
Discharge: HOME | End: 2023-04-22
Attending: EMERGENCY MEDICINE
Payer: MEDICAID

## 2023-04-22 VITALS
DIASTOLIC BLOOD PRESSURE: 102 MMHG | WEIGHT: 119 LBS | OXYGEN SATURATION: 96 % | HEART RATE: 96 BPM | SYSTOLIC BLOOD PRESSURE: 182 MMHG | TEMPERATURE: 98 F | BODY MASS INDEX: 21.77 KG/M2 | RESPIRATION RATE: 24 BRPM

## 2023-04-22 DIAGNOSIS — R07.9 CHEST PAIN: ICD-10-CM

## 2023-04-22 DIAGNOSIS — Z99.2 ESRD (END STAGE RENAL DISEASE) ON DIALYSIS: Chronic | ICD-10-CM

## 2023-04-22 DIAGNOSIS — D63.1 ANEMIA IN ESRD (END-STAGE RENAL DISEASE): ICD-10-CM

## 2023-04-22 DIAGNOSIS — N18.6 END STAGE RENAL DISEASE: ICD-10-CM

## 2023-04-22 DIAGNOSIS — N18.6 ESRD (END STAGE RENAL DISEASE) ON DIALYSIS: Chronic | ICD-10-CM

## 2023-04-22 DIAGNOSIS — I31.39 PERICARDIAL EFFUSION: ICD-10-CM

## 2023-04-22 DIAGNOSIS — E87.5 HYPERKALEMIA: Primary | ICD-10-CM

## 2023-04-22 DIAGNOSIS — N18.6 ANEMIA IN ESRD (END-STAGE RENAL DISEASE): ICD-10-CM

## 2023-04-22 DIAGNOSIS — R06.02 SHORTNESS OF BREATH: ICD-10-CM

## 2023-04-22 DIAGNOSIS — R10.9 ABDOMINAL PAIN: ICD-10-CM

## 2023-04-22 DIAGNOSIS — R10.84 GENERALIZED ABDOMINAL PAIN: ICD-10-CM

## 2023-04-22 DIAGNOSIS — K31.84 GASTROPARESIS: Chronic | ICD-10-CM

## 2023-04-22 PROBLEM — E87.1 HYPONATREMIA: Status: ACTIVE | Noted: 2023-04-22

## 2023-04-22 LAB
ABO + RH BLD: NORMAL
ACETONE BLD-MCNC: NEGATIVE MG/DL
ALBUMIN SERPL BCP-MCNC: 3.4 G/DL (ref 3.5–5.2)
ALP SERPL-CCNC: 403 U/L (ref 55–135)
ALT SERPL W/O P-5'-P-CCNC: 63 U/L (ref 10–44)
ANION GAP SERPL CALC-SCNC: 15 MMOL/L (ref 8–16)
AST SERPL-CCNC: 53 U/L (ref 10–40)
BASOPHILS # BLD AUTO: 0.02 K/UL (ref 0–0.2)
BASOPHILS NFR BLD: 0.5 % (ref 0–1.9)
BILIRUB SERPL-MCNC: 2 MG/DL (ref 0.1–1)
BLD GP AB SCN CELLS X3 SERPL QL: NORMAL
BUN SERPL-MCNC: 51 MG/DL (ref 6–20)
CALCIUM SERPL-MCNC: 9 MG/DL (ref 8.7–10.5)
CHLORIDE SERPL-SCNC: 89 MMOL/L (ref 95–110)
CO2 SERPL-SCNC: 26 MMOL/L (ref 23–29)
CREAT SERPL-MCNC: 5.7 MG/DL (ref 0.5–1.4)
DIFFERENTIAL METHOD: ABNORMAL
EOSINOPHIL # BLD AUTO: 0.1 K/UL (ref 0–0.5)
EOSINOPHIL NFR BLD: 1.9 % (ref 0–8)
ERYTHROCYTE [DISTWIDTH] IN BLOOD BY AUTOMATED COUNT: 15.4 % (ref 11.5–14.5)
EST. GFR  (NO RACE VARIABLE): 9.4 ML/MIN/1.73 M^2
GLUCOSE SERPL-MCNC: 192 MG/DL (ref 70–110)
GLUCOSE SERPL-MCNC: 223 MG/DL (ref 70–110)
GLUCOSE SERPL-MCNC: 357 MG/DL (ref 70–110)
GLUCOSE SERPL-MCNC: 718 MG/DL (ref 70–110)
HCT VFR BLD AUTO: 22.2 % (ref 37–48.5)
HGB BLD-MCNC: 7.5 G/DL (ref 12–16)
IMM GRANULOCYTES # BLD AUTO: 0.01 K/UL (ref 0–0.04)
IMM GRANULOCYTES NFR BLD AUTO: 0.3 % (ref 0–0.5)
LACTATE SERPL-SCNC: 0.7 MMOL/L (ref 0.5–1.9)
LIPASE SERPL-CCNC: 42 U/L (ref 4–60)
LYMPHOCYTES # BLD AUTO: 0.5 K/UL (ref 1–4.8)
LYMPHOCYTES NFR BLD: 13.1 % (ref 18–48)
MAGNESIUM SERPL-MCNC: 2.1 MG/DL (ref 1.6–2.6)
MCH RBC QN AUTO: 29.3 PG (ref 27–31)
MCHC RBC AUTO-ENTMCNC: 33.8 G/DL (ref 32–36)
MCV RBC AUTO: 87 FL (ref 82–98)
MONOCYTES # BLD AUTO: 0.3 K/UL (ref 0.3–1)
MONOCYTES NFR BLD: 8.3 % (ref 4–15)
NEUTROPHILS # BLD AUTO: 2.8 K/UL (ref 1.8–7.7)
NEUTROPHILS NFR BLD: 75.9 % (ref 38–73)
NRBC BLD-RTO: 0 /100 WBC
PHOSPHATE SERPL-MCNC: 5.4 MG/DL (ref 2.7–4.5)
PLATELET # BLD AUTO: 75 K/UL (ref 150–450)
PMV BLD AUTO: 9.5 FL (ref 9.2–12.9)
POCT GLUCOSE: 383 MG/DL (ref 70–110)
POCT GLUCOSE: >500 MG/DL (ref 70–110)
POTASSIUM SERPL-SCNC: 5.4 MMOL/L (ref 3.5–5.1)
PROT SERPL-MCNC: 7.5 G/DL (ref 6–8.4)
RBC # BLD AUTO: 2.56 M/UL (ref 4–5.4)
SODIUM SERPL-SCNC: 130 MMOL/L (ref 136–145)
SPECIMEN OUTDATE: NORMAL
WBC # BLD AUTO: 3.73 K/UL (ref 3.9–12.7)

## 2023-04-22 PROCEDURE — 96376 TX/PRO/DX INJ SAME DRUG ADON: CPT

## 2023-04-22 PROCEDURE — 71045 X-RAY EXAM CHEST 1 VIEW: CPT | Mod: 26,,, | Performed by: RADIOLOGY

## 2023-04-22 PROCEDURE — 83690 ASSAY OF LIPASE: CPT | Performed by: EMERGENCY MEDICINE

## 2023-04-22 PROCEDURE — 96375 TX/PRO/DX INJ NEW DRUG ADDON: CPT

## 2023-04-22 PROCEDURE — 86920 COMPATIBILITY TEST SPIN: CPT | Performed by: NURSE PRACTITIONER

## 2023-04-22 PROCEDURE — 25000003 PHARM REV CODE 250: Performed by: NURSE PRACTITIONER

## 2023-04-22 PROCEDURE — 25000003 PHARM REV CODE 250: Performed by: INTERNAL MEDICINE

## 2023-04-22 PROCEDURE — 25000003 PHARM REV CODE 250: Performed by: EMERGENCY MEDICINE

## 2023-04-22 PROCEDURE — 71045 XR CHEST AP PORTABLE: ICD-10-PCS | Mod: 26,,, | Performed by: RADIOLOGY

## 2023-04-22 PROCEDURE — 80053 COMPREHEN METABOLIC PANEL: CPT | Performed by: EMERGENCY MEDICINE

## 2023-04-22 PROCEDURE — G0378 HOSPITAL OBSERVATION PER HR: HCPCS

## 2023-04-22 PROCEDURE — 63600175 PHARM REV CODE 636 W HCPCS: Performed by: INTERNAL MEDICINE

## 2023-04-22 PROCEDURE — 86900 BLOOD TYPING SEROLOGIC ABO: CPT | Performed by: INTERNAL MEDICINE

## 2023-04-22 PROCEDURE — 99285 EMERGENCY DEPT VISIT HI MDM: CPT | Mod: 25

## 2023-04-22 PROCEDURE — 83605 ASSAY OF LACTIC ACID: CPT | Performed by: INTERNAL MEDICINE

## 2023-04-22 PROCEDURE — 85025 COMPLETE CBC W/AUTO DIFF WBC: CPT | Performed by: EMERGENCY MEDICINE

## 2023-04-22 PROCEDURE — 74018 RADEX ABDOMEN 1 VIEW: CPT | Mod: 26,,, | Performed by: RADIOLOGY

## 2023-04-22 PROCEDURE — 86920 COMPATIBILITY TEST SPIN: CPT | Performed by: INTERNAL MEDICINE

## 2023-04-22 PROCEDURE — 90935 HEMODIALYSIS ONE EVALUATION: CPT

## 2023-04-22 PROCEDURE — 82009 KETONE BODYS QUAL: CPT | Performed by: EMERGENCY MEDICINE

## 2023-04-22 PROCEDURE — 74018 RADEX ABDOMEN 1 VIEW: CPT | Mod: TC

## 2023-04-22 PROCEDURE — 96374 THER/PROPH/DIAG INJ IV PUSH: CPT

## 2023-04-22 PROCEDURE — 83735 ASSAY OF MAGNESIUM: CPT | Performed by: EMERGENCY MEDICINE

## 2023-04-22 PROCEDURE — 99284 EMERGENCY DEPT VISIT MOD MDM: CPT | Mod: 25

## 2023-04-22 PROCEDURE — 71045 X-RAY EXAM CHEST 1 VIEW: CPT | Mod: TC

## 2023-04-22 PROCEDURE — 82962 GLUCOSE BLOOD TEST: CPT

## 2023-04-22 PROCEDURE — 96374 THER/PROPH/DIAG INJ IV PUSH: CPT | Mod: 59

## 2023-04-22 PROCEDURE — 84100 ASSAY OF PHOSPHORUS: CPT | Performed by: EMERGENCY MEDICINE

## 2023-04-22 PROCEDURE — 74018 XR ABDOMEN AP 1 VIEW: ICD-10-PCS | Mod: 26,,, | Performed by: RADIOLOGY

## 2023-04-22 PROCEDURE — 63600175 PHARM REV CODE 636 W HCPCS: Performed by: EMERGENCY MEDICINE

## 2023-04-22 RX ORDER — LEVETIRACETAM 500 MG/1
500 TABLET ORAL 2 TIMES DAILY
Status: DISCONTINUED | OUTPATIENT
Start: 2023-04-22 | End: 2023-04-25 | Stop reason: HOSPADM

## 2023-04-22 RX ORDER — HYDRALAZINE HYDROCHLORIDE 20 MG/ML
10 INJECTION INTRAMUSCULAR; INTRAVENOUS EVERY 8 HOURS PRN
Status: DISCONTINUED | OUTPATIENT
Start: 2023-04-22 | End: 2023-04-25 | Stop reason: HOSPADM

## 2023-04-22 RX ORDER — SODIUM CHLORIDE 9 MG/ML
INJECTION, SOLUTION INTRAVENOUS
Status: CANCELLED | OUTPATIENT
Start: 2023-04-22

## 2023-04-22 RX ORDER — MUPIROCIN 20 MG/G
OINTMENT TOPICAL 2 TIMES DAILY
Status: DISCONTINUED | OUTPATIENT
Start: 2023-04-22 | End: 2023-04-25 | Stop reason: HOSPADM

## 2023-04-22 RX ORDER — HYDRALAZINE HYDROCHLORIDE 25 MG/1
100 TABLET, FILM COATED ORAL 2 TIMES DAILY
Status: DISCONTINUED | OUTPATIENT
Start: 2023-04-22 | End: 2023-04-22

## 2023-04-22 RX ORDER — SODIUM CHLORIDE 9 MG/ML
INJECTION, SOLUTION INTRAVENOUS ONCE
Status: CANCELLED | OUTPATIENT
Start: 2023-04-22 | End: 2023-04-22

## 2023-04-22 RX ORDER — AMLODIPINE BESYLATE 5 MG/1
5 TABLET ORAL DAILY
Status: DISCONTINUED | OUTPATIENT
Start: 2023-04-22 | End: 2023-04-23

## 2023-04-22 RX ORDER — CARVEDILOL 12.5 MG/1
25 TABLET ORAL 2 TIMES DAILY WITH MEALS
Status: DISCONTINUED | OUTPATIENT
Start: 2023-04-22 | End: 2023-04-25 | Stop reason: HOSPADM

## 2023-04-22 RX ORDER — IBUPROFEN 200 MG
16 TABLET ORAL
Status: DISCONTINUED | OUTPATIENT
Start: 2023-04-22 | End: 2023-04-22

## 2023-04-22 RX ORDER — CLONIDINE 0.2 MG/24H
1 PATCH, EXTENDED RELEASE TRANSDERMAL
Status: DISCONTINUED | OUTPATIENT
Start: 2023-04-22 | End: 2023-04-22 | Stop reason: HOSPADM

## 2023-04-22 RX ORDER — ONDANSETRON 2 MG/ML
4 INJECTION INTRAMUSCULAR; INTRAVENOUS EVERY 8 HOURS PRN
Status: DISCONTINUED | OUTPATIENT
Start: 2023-04-22 | End: 2023-04-25 | Stop reason: HOSPADM

## 2023-04-22 RX ORDER — SODIUM CHLORIDE 0.9 % (FLUSH) 0.9 %
10 SYRINGE (ML) INJECTION EVERY 6 HOURS PRN
Status: DISCONTINUED | OUTPATIENT
Start: 2023-04-22 | End: 2023-04-25 | Stop reason: HOSPADM

## 2023-04-22 RX ORDER — GLUCAGON 1 MG
1 KIT INJECTION
Status: DISCONTINUED | OUTPATIENT
Start: 2023-04-22 | End: 2023-04-25 | Stop reason: HOSPADM

## 2023-04-22 RX ORDER — IBUPROFEN 200 MG
16 TABLET ORAL
Status: DISCONTINUED | OUTPATIENT
Start: 2023-04-22 | End: 2023-04-25 | Stop reason: HOSPADM

## 2023-04-22 RX ORDER — FLUOXETINE HYDROCHLORIDE 20 MG/1
20 CAPSULE ORAL DAILY
Status: DISCONTINUED | OUTPATIENT
Start: 2023-04-22 | End: 2023-04-25 | Stop reason: HOSPADM

## 2023-04-22 RX ORDER — AMOXICILLIN 250 MG
2 CAPSULE ORAL 2 TIMES DAILY
Status: DISCONTINUED | OUTPATIENT
Start: 2023-04-22 | End: 2023-04-25 | Stop reason: HOSPADM

## 2023-04-22 RX ORDER — ISOSORBIDE MONONITRATE 60 MG/1
60 TABLET, EXTENDED RELEASE ORAL DAILY
Status: DISCONTINUED | OUTPATIENT
Start: 2023-04-22 | End: 2023-04-24

## 2023-04-22 RX ORDER — ACETAMINOPHEN 325 MG/1
650 TABLET ORAL EVERY 6 HOURS PRN
Status: DISCONTINUED | OUTPATIENT
Start: 2023-04-22 | End: 2023-04-25 | Stop reason: HOSPADM

## 2023-04-22 RX ORDER — AMLODIPINE BESYLATE 5 MG/1
5 TABLET ORAL ONCE
Status: COMPLETED | OUTPATIENT
Start: 2023-04-22 | End: 2023-04-22

## 2023-04-22 RX ORDER — INSULIN ASPART 100 [IU]/ML
1-10 INJECTION, SOLUTION INTRAVENOUS; SUBCUTANEOUS
Status: DISCONTINUED | OUTPATIENT
Start: 2023-04-22 | End: 2023-04-25 | Stop reason: HOSPADM

## 2023-04-22 RX ORDER — GLUCAGON 1 MG
1 KIT INJECTION
Status: DISCONTINUED | OUTPATIENT
Start: 2023-04-22 | End: 2023-04-22

## 2023-04-22 RX ORDER — DICYCLOMINE HYDROCHLORIDE 10 MG/1
10 CAPSULE ORAL 4 TIMES DAILY PRN
Status: DISCONTINUED | OUTPATIENT
Start: 2023-04-22 | End: 2023-04-25 | Stop reason: HOSPADM

## 2023-04-22 RX ORDER — PROCHLORPERAZINE EDISYLATE 5 MG/ML
10 INJECTION INTRAMUSCULAR; INTRAVENOUS ONCE
Status: COMPLETED | OUTPATIENT
Start: 2023-04-22 | End: 2023-04-22

## 2023-04-22 RX ORDER — NALOXONE HCL 0.4 MG/ML
0.02 VIAL (ML) INJECTION
Status: DISCONTINUED | OUTPATIENT
Start: 2023-04-22 | End: 2023-04-25 | Stop reason: HOSPADM

## 2023-04-22 RX ORDER — SUCRALFATE 1 G/10ML
1 SUSPENSION ORAL
Status: DISCONTINUED | OUTPATIENT
Start: 2023-04-22 | End: 2023-04-25 | Stop reason: HOSPADM

## 2023-04-22 RX ORDER — HALOPERIDOL 5 MG/ML
5 INJECTION INTRAMUSCULAR
Status: COMPLETED | OUTPATIENT
Start: 2023-04-22 | End: 2023-04-22

## 2023-04-22 RX ORDER — AMITRIPTYLINE HYDROCHLORIDE 25 MG/1
50 TABLET, FILM COATED ORAL NIGHTLY
Status: DISCONTINUED | OUTPATIENT
Start: 2023-04-22 | End: 2023-04-25 | Stop reason: HOSPADM

## 2023-04-22 RX ORDER — GABAPENTIN 100 MG/1
100 CAPSULE ORAL 3 TIMES DAILY
Status: DISCONTINUED | OUTPATIENT
Start: 2023-04-22 | End: 2023-04-25 | Stop reason: HOSPADM

## 2023-04-22 RX ORDER — IBUPROFEN 200 MG
24 TABLET ORAL
Status: DISCONTINUED | OUTPATIENT
Start: 2023-04-22 | End: 2023-04-22

## 2023-04-22 RX ORDER — HYDRALAZINE HYDROCHLORIDE 25 MG/1
100 TABLET, FILM COATED ORAL 2 TIMES DAILY
Status: DISCONTINUED | OUTPATIENT
Start: 2023-04-22 | End: 2023-04-24

## 2023-04-22 RX ORDER — PROCHLORPERAZINE EDISYLATE 5 MG/ML
5 INJECTION INTRAMUSCULAR; INTRAVENOUS EVERY 6 HOURS PRN
Status: DISCONTINUED | OUTPATIENT
Start: 2023-04-22 | End: 2023-04-25 | Stop reason: HOSPADM

## 2023-04-22 RX ORDER — TALC
6 POWDER (GRAM) TOPICAL NIGHTLY PRN
Status: DISCONTINUED | OUTPATIENT
Start: 2023-04-22 | End: 2023-04-25 | Stop reason: HOSPADM

## 2023-04-22 RX ORDER — IBUPROFEN 200 MG
24 TABLET ORAL
Status: DISCONTINUED | OUTPATIENT
Start: 2023-04-22 | End: 2023-04-25 | Stop reason: HOSPADM

## 2023-04-22 RX ORDER — HYDRALAZINE HYDROCHLORIDE 20 MG/ML
15 INJECTION INTRAMUSCULAR; INTRAVENOUS ONCE
Status: COMPLETED | OUTPATIENT
Start: 2023-04-22 | End: 2023-04-22

## 2023-04-22 RX ORDER — PANTOPRAZOLE SODIUM 40 MG/10ML
40 INJECTION, POWDER, LYOPHILIZED, FOR SOLUTION INTRAVENOUS DAILY
Status: DISCONTINUED | OUTPATIENT
Start: 2023-04-22 | End: 2023-04-25

## 2023-04-22 RX ORDER — LORAZEPAM 0.5 MG/1
0.5 TABLET ORAL
Status: COMPLETED | OUTPATIENT
Start: 2023-04-22 | End: 2023-04-22

## 2023-04-22 RX ADMIN — AMITRIPTYLINE HYDROCHLORIDE 50 MG: 25 TABLET, FILM COATED ORAL at 08:04

## 2023-04-22 RX ADMIN — SUCRALFATE 1 G: 1 SUSPENSION ORAL at 07:04

## 2023-04-22 RX ADMIN — LORAZEPAM 0.5 MG: 0.5 TABLET ORAL at 03:04

## 2023-04-22 RX ADMIN — ISOSORBIDE MONONITRATE 60 MG: 60 TABLET, EXTENDED RELEASE ORAL at 07:04

## 2023-04-22 RX ADMIN — HYDRALAZINE HYDROCHLORIDE 100 MG: 25 TABLET, FILM COATED ORAL at 06:04

## 2023-04-22 RX ADMIN — AMLODIPINE BESYLATE 5 MG: 5 TABLET ORAL at 07:04

## 2023-04-22 RX ADMIN — SENNOSIDES AND DOCUSATE SODIUM 2 TABLET: 50; 8.6 TABLET ORAL at 11:04

## 2023-04-22 RX ADMIN — AMLODIPINE BESYLATE 5 MG: 5 TABLET ORAL at 11:04

## 2023-04-22 RX ADMIN — DICYCLOMINE HYDROCHLORIDE 10 MG: 10 CAPSULE ORAL at 10:04

## 2023-04-22 RX ADMIN — INSULIN HUMAN 10 UNITS: 100 INJECTION, SOLUTION PARENTERAL at 08:04

## 2023-04-22 RX ADMIN — HYDRALAZINE HYDROCHLORIDE 15 MG: 20 INJECTION, SOLUTION INTRAMUSCULAR; INTRAVENOUS at 07:04

## 2023-04-22 RX ADMIN — CARVEDILOL 25 MG: 12.5 TABLET, FILM COATED ORAL at 06:04

## 2023-04-22 RX ADMIN — SUCRALFATE 1 G: 1 SUSPENSION ORAL at 10:04

## 2023-04-22 RX ADMIN — APIXABAN 2.5 MG: 2.5 TABLET, FILM COATED ORAL at 08:04

## 2023-04-22 RX ADMIN — HALOPERIDOL LACTATE 5 MG: 5 INJECTION, SOLUTION INTRAMUSCULAR at 08:04

## 2023-04-22 RX ADMIN — ACETAMINOPHEN 650 MG: 325 TABLET ORAL at 07:04

## 2023-04-22 RX ADMIN — CLONIDINE 1 PATCH: 0.2 PATCH TRANSDERMAL at 09:04

## 2023-04-22 RX ADMIN — GABAPENTIN 100 MG: 100 CAPSULE ORAL at 08:04

## 2023-04-22 RX ADMIN — PROCHLORPERAZINE EDISYLATE 10 MG: 5 INJECTION INTRAMUSCULAR; INTRAVENOUS at 08:04

## 2023-04-22 RX ADMIN — FLUOXETINE 20 MG: 20 CAPSULE ORAL at 07:04

## 2023-04-22 RX ADMIN — LEVETIRACETAM 500 MG: 500 TABLET, FILM COATED ORAL at 08:04

## 2023-04-22 RX ADMIN — HYDRALAZINE HYDROCHLORIDE 10 MG: 20 INJECTION INTRAMUSCULAR; INTRAVENOUS at 06:04

## 2023-04-22 NOTE — ED NOTES
"Pt removed self from cardiac monitor and refusing to be placed back on it stating," The wires are in the way and the stickers are irritating me. I explained the importance of being able to monitor her, but patient continues to refuse.   "

## 2023-04-22 NOTE — NURSING
Patient currently in dialysis. Approached by dialysis nurse regarding pt /100. Notified Dr. Bobo. Will give scheduled PO coreg. Will also administer PO hydralazine dose now instead of at 2100. Will also administer PRN IV hydralazine.    Notified Dr. Bobo patient does not currently have IV in place upon transfer from ER to dialysis. Patient will require IV when transferred to floor.

## 2023-04-22 NOTE — ED NOTES
"Called to bedside by patient. Pt requesting for pain medicine. I informed patient that the md would be coming in to speak with her regarding disposition and results. Pt states,"He been suppose to come. Call my dad so I can go to another hospital."   "

## 2023-04-22 NOTE — HPI
Ms. Howard is a 33-years-old female who presented to the ED with chief complaint of generalized abdominal pain.  At times difficult to obtain full history from patient.  Seen during dialysis.  She reports she developed generalized abdominal pain today, severe, persistent, as per chart review she was seen 2 days consecutively at [place] ED due to generalized abdominal pain, she states she went on those days due to chest pain with shortness of breath rather than abdominal pain.  Reportedly had normal bowel movement yesterday.  Known history of ESRD on HD TTS, states she did have last dialysis on Thursday.  Unclear of her medication, states her mother assist with same.  Of note patient has history of chronic abdominal pain, has had previous evaluation and multiple admissions for same, suspected gastroparesis but has not had emptying study, last admission was at [facility] with recommendation for multimodal approach, gabapentin and amitriptyline were advised, however as per chart review patient perseverating on IV Dilaudid.  At outside hospital, CT abdomen and pelvis with concern for constipation, no other acute findings, KUB nonobstructive bowel gas pattern.  She was given Haldol and Compazine but was further requesting pain medication and ultimately left [place] and presented to [facility] ED rather than outpatient dialysis.  Blood pressure elevated 187/112, on room air, afebrile.  Labs with hemoglobin 7.5, sodium 130, potassium 5.4, BUN/creatinine 51/5.7, alkaline phosphatase 403, lipase 42, glucose down to 383.  Previous echocardiogram with EF of 55% with grade 3 diastolic dysfunction with moderate to severe tricuspid regurgitation with PASP 56 with pericardial effusion without tamponade.  ED provider administered Ativan 0.4 mg.  Case discussed with ED provider who requested admission, states discussed with Nephrology and planning  dialysis today.  Discussed with dialysis nurse.

## 2023-04-22 NOTE — H&P
Formerly [facility] - Emergency Dept  Hospital Medicine  History & Physical    Patient Name: Tabby Howard  MRN: [id number]  Patient Class: OP- Observation  Admission Date: 4/22/2023  Attending Physician: Suzy Bobo MD   Primary Care Provider: AIDEN CONNER NP         Patient information was obtained from patient, past medical records and ER records.     Subjective:     Principal Problem:Abdominal pain    Chief Complaint:   Chief Complaint   Patient presents with    Abdominal Pain    Back Pain     X today. Pt missed dialysis today.         HPI: Ms. Howard is a 33-years-old female who presented to the ED with chief complaint of generalized abdominal pain.  At times difficult to obtain full history from patient.  Seen during dialysis.  She reports she developed generalized abdominal pain today, severe, persistent, as per chart review she was seen 2 days consecutively at [facility] ED due to generalized abdominal pain, she states she went on those days due to chest pain with shortness of breath rather than abdominal pain.  Reportedly had normal bowel movement yesterday.  Known history of ESRD on HD TTS, states she did have last dialysis on Thursday.  Unclear of her medication, states her mother assist with same.  Of note patient has history of chronic abdominal pain, has had previous evaluation and multiple admissions for same, suspected gastroparesis but has not had emptying study, last admission was at [facility] with recommendation for multimodal approach, gabapentin and amitriptyline were advised, however as per chart review patient perseverating on IV Dilaudid.  At outside hospital, CT abdomen and pelvis with concern for constipation, no other acute findings, KUB nonobstructive bowel gas pattern.  She was given Haldol and Compazine but was further requesting pain medication and ultimately left [facility] and presented to [facility] ED rather than outpatient dialysis.  Blood pressure elevated 187/112, on room air,  afebrile.  Labs with hemoglobin 7.5, sodium 130, potassium 5.4, BUN/creatinine 51/5.7, alkaline phosphatase 403, lipase 42, glucose down to 383.  Previous echocardiogram with EF of 55% with grade 3 diastolic dysfunction with moderate to severe tricuspid regurgitation with PASP 56 with pericardial effusion without tamponade.  ED provider administered Ativan 0.4 mg.  Case discussed with ED provider who requested admission, states discussed with Nephrology and planning  dialysis today.  Discussed with dialysis nurse.      Past Medical History:   Diagnosis Date    ESRD on hemodialysis 2022    Gastritis 2022    EGD was 22    Gastroparesis 2022    has not had Emptying study    Heart failure with preserved ejection fraction 2022    EF 55% on 3/22    History of supraventricular tachycardia     Hyperkalemia 2022    Hypertensive emergency 2022    Sickle cell trait 2022    Type 2 diabetes mellitus        Past Surgical History:   Procedure Laterality Date     SECTION      x 3    COLONOSCOPY      COLONOSCOPY N/A 2022    Procedure: COLONOSCOPY;  Surgeon: Jagdeep Cedeno MD;  Location: [facility];  Service: Endoscopy;  Laterality: N/A;    ESOPHAGOGASTRODUODENOSCOPY N/A 10/18/2019    Procedure: ESOPHAGOGASTRODUODENOSCOPY (EGD);  Surgeon: Gianluca Mendez MD;  Location: [facility];  Service: Endoscopy;  Laterality: N/A;    ESOPHAGOGASTRODUODENOSCOPY N/A 2022    Procedure: EGD (ESOPHAGOGASTRODUODENOSCOPY);  Surgeon: Micky Paredes III, MD;  Location: [facility];  Service: Endoscopy;  Laterality: N/A;    ESOPHAGOGASTRODUODENOSCOPY N/A 2022    Procedure: EGD (ESOPHAGOGASTRODUODENOSCOPY);  Surgeon: Marcelo Zhong MD;  Location: [facility] ENDO;  Service: Endoscopy;  Laterality: N/A;    LAPAROSCOPIC CHOLECYSTECTOMY N/A 2022    Procedure: CHOLECYSTECTOMY, LAPAROSCOPIC;  Surgeon: Grey Perez MD;  Location: [facility];  Service: General;  Laterality:  N/A;    PLACEMENT OF DUAL-LUMEN VASCULAR CATHETER Left 07/12/2022    Procedure: INSERTION-CATHETER-JOSEPH;  Surgeon: Dionte Gan MD;  Location: [facility] OR;  Service: General;  Laterality: Left;    PLACEMENT OF DUAL-LUMEN VASCULAR CATHETER Right 07/26/2022    Procedure: INSERTION-CATHETER-Hemosplit;  Surgeon: Dionte Gan MD;  Location: [facility] OR;  Service: General;  Laterality: Right;       Review of patient's allergies indicates:   Allergen Reactions    Penicillins Hives       Current Facility-Administered Medications on File Prior to Encounter   Medication    [COMPLETED] haloperidol lactate injection 5 mg    [COMPLETED] insulin regular injection 10 Units 0.1 mL    [COMPLETED] prochlorperazine injection Soln 10 mg    [DISCONTINUED] cloNIDine 0.2 mg/24 hr td ptwk 1 patch     Current Outpatient Medications on File Prior to Encounter   Medication Sig    albuterol (PROVENTIL/VENTOLIN HFA) 90 mcg/actuation inhaler Inhale 2 puffs into the lungs every 6 (six) hours as needed for Wheezing. Rescue    amitriptyline (ELAVIL) 50 MG tablet Take 1 tablet (50 mg total) by mouth every evening.    amLODIPine (NORVASC) 5 MG tablet Take 1 tablet (5 mg total) by mouth once daily.    apixaban (ELIQUIS) 5 mg Tab Take 1 tablet (5 mg total) by mouth 2 (two) times daily.    carvediloL (COREG) 25 MG tablet Take 1 tablet twice a day by oral route for 30 days.    dicyclomine (BENTYL) 10 MG capsule Take 1 capsule (10 mg total) by mouth 4 (four) times daily as needed (abdominal pain).    FLUoxetine 20 MG capsule Take 1 capsule every day by oral route for 30 days.    gabapentin (NEURONTIN) 100 MG capsule Take 1 capsule by mouth 3 times daily    hydrALAZINE (APRESOLINE) 100 MG tablet Take 1 tablet twice a day by oral route for 30 days.    insulin aspart U-100 (NOVOLOG) 100 unit/mL (3 mL) InPn pen Inject 4 Units into the skin 3 (three) times daily with meals.    insulin detemir U-100, Levemir, 100 unit/mL (3 mL) SubQ InPn pen  "Inject 6 Units into the skin 2 (two) times daily.    isosorbide mononitrate (IMDUR) 60 MG 24 hr tablet Take 1 tablet every day by oral route for 30 days.    levETIRAcetam (KEPPRA) 500 MG Tab Take 1 tablet twice a day by oral route for 30 days.    ondansetron (ZOFRAN-ODT) 4 MG TbDL Take 1 tablet (4 mg total) by mouth every 6 (six) hours as needed.    pantoprazole (PROTONIX) 40 MG tablet Take 1 tablet every day by oral route for 30 days.    pen needle, diabetic 31 gauge x 3/16" Ndle Use as directed to inject insulin 5 times daily    sucralfate (CARAFATE) 100 mg/mL suspension Take 10 mLs (1 g total) by mouth 4 (four) times daily before meals and nightly.    [DISCONTINUED] atenoloL (TENORMIN) 50 MG tablet Take 1 tablet (50 mg total) by mouth every other day.    [DISCONTINUED] omeprazole (PRILOSEC) 20 MG capsule Take 2 capsules (40 mg total) by mouth once daily. for 10 days     Family History       Problem Relation (Age of Onset)    Diabetes Mother, Father          Tobacco Use    Smoking status: Never    Smokeless tobacco: Never   Substance and Sexual Activity    Alcohol use: Not Currently    Drug use: No    Sexual activity: Not Currently     Partners: Male     Birth control/protection: I.U.D.     Review of Systems   Constitutional:  Negative for fever.   Cardiovascular:  Negative for leg swelling.   Gastrointestinal:  Positive for abdominal pain. Negative for constipation.   Genitourinary:         Does not produce urine   Psychiatric/Behavioral:  Positive for dysphoric mood.    Objective:     Vital Signs (Most Recent):  Temp: 97.6 °F (36.4 °C) (04/22/23 1446)  Pulse: 92 (04/22/23 1446)  Resp: 20 (04/22/23 1446)  BP: (!) 187/121 (04/22/23 1446)  SpO2: 96 % (04/22/23 1446)   Vital Signs (24h Range):  Temp:  [97.6 °F (36.4 °C)-98.2 °F (36.8 °C)] 97.6 °F (36.4 °C)  Pulse:  [92-96] 92  Resp:  [20-25] 20  SpO2:  [95 %-97 %] 96 %  BP: (169-187)/(102-125) 187/121     Weight: 54 kg (119 lb)  Body mass index is 21.77 " kg/m².    Physical Exam  Vitals and nursing note reviewed.   Constitutional:       Comments: Appears older than stated age, at times sitting in bed calling out and rubbing abdomen, observed from afar at times lying in bed with eyes closed   HENT:      Head: Normocephalic and atraumatic.      Mouth/Throat:      Mouth: Mucous membranes are moist.   Cardiovascular:      Rate and Rhythm: Normal rate and regular rhythm.      Comments: Warm peripheries, no significant LE edema  Pulmonary:      Effort: Pulmonary effort is normal. No respiratory distress.      Breath sounds: Normal breath sounds. No stridor. No wheezing or rhonchi.      Comments: On RA  Abdominal:      Comments: Abdomen non distended, soft, BS heard   Skin:     Comments: R sided chest wall dialysis catheter with dressing overlying    Neurological:      Mental Status: She is alert and oriented to person, place, and time.      Comments: No aphasia, moving all four extremities spontaneously           Significant Labs: Bilirubin:   Recent Labs   Lab 04/13/23  0716 04/14/23  0632 04/17/23  0958 04/21/23  0455 04/22/23  0829   BILITOT 1.1* 0.9 1.0 1.4* 2.0*     BMP:   Recent Labs   Lab 04/22/23  0829   *   *   K 5.4*   CL 89*   CO2 26   BUN 51*   CREATININE 5.7*   CALCIUM 9.0   MG 2.1     CBC:   Recent Labs   Lab 04/21/23  0455 04/22/23  0829   WBC 5.30 3.73*   HGB 8.6* 7.5*   HCT 24.9* 22.2*   * 75*     CMP:   Recent Labs   Lab 04/21/23  0455 04/22/23  0829    130*   K 5.3* 5.4*   CL 98 89*   CO2 22* 26   * 718*   BUN 30* 51*   CREATININE 3.9* 5.7*   CALCIUM 10.2 9.0   PROT 9.0* 7.5   ALBUMIN 3.9 3.4*   BILITOT 1.4* 2.0*   ALKPHOS 464* 403*   AST 71* 53*   ALT 65* 63*   ANIONGAP 20* 15     Lactic Acid: No results for input(s): LACTATE in the last 48 hours.  Magnesium:   Recent Labs   Lab 04/22/23  0829   MG 2.1     POCT Glucose:   Recent Labs   Lab 04/22/23  0828 04/22/23  1020   POCTGLUCOSE >500* 383*     Troponin: No results  for input(s): TROPONINI, TROPONINIHS in the last 48 hours.  TSH:   Recent Labs   Lab 02/08/23  0505   TSH 1.141     Urine Culture: No results for input(s): LABURIN in the last 48 hours.  Urine Studies: No results for input(s): COLORU, APPEARANCEUA, PHUR, SPECGRAV, PROTEINUA, GLUCUA, KETONESU, BILIRUBINUA, OCCULTUA, NITRITE, UROBILINOGEN, LEUKOCYTESUR, RBCUA, WBCUA, BACTERIA, SQUAMEPITHEL, HYALINECASTS in the last 48 hours.    Invalid input(s): WRIGHTSUR    Significant Imaging: I have reviewed all pertinent imaging results/findings within the past 24 hours.    X-Ray Abdomen AP 1 View (KUB)    Result Date: 4/22/2023  EXAMINATION: XR ABDOMEN AP 1 VIEW CLINICAL HISTORY: Generalized abdominal pain TECHNIQUE: AP View(s) of the abdomen was performed. COMPARISON: Abdomen x-ray of March 13, 2023 FINDINGS: The bowel gas pattern is within normal limits.  Surgical clips in the right upper quadrant are consistent with prior cholecystectomy.  No free air is seen.  No masses or calcifications are identified.     Prior cholecystectomy.  Otherwise negative abdomen x-ray. Electronically signed by: Gianluca Arriaga MD Date:    04/22/2023 Time:    09:51    X-Ray Chest AP Portable    Result Date: 4/22/2023  EXAMINATION: XR CHEST AP PORTABLE CLINICAL HISTORY: Shortness of breath TECHNIQUE: Single frontal view of the chest was performed. COMPARISON: Chest x-ray of April 17, 2022 FINDINGS: A double lumen central line enters on the right and ends at the level of the right atrium.  There is cardiomegaly in a biventricular pattern.  Intrapulmonary mass or infiltrate is not seen.  There is no pneumothorax or pleural effusion.     Moderate cardiomegaly.  Double-lumen central line ends at the level of the right atrium. Electronically signed by: Gianluca Arriaga MD Date:    04/22/2023 Time:    09:52    CT Abdomen Pelvis  Without Contrast    Result Date: 4/21/2023  EXAMINATION: CT ABDOMEN PELVIS WITHOUT CONTRAST CLINICAL HISTORY: Abdominal pain,  acute, nonlocalized; TECHNIQUE: Low dose axial images, sagittal and coronal reformations were obtained from the lung bases to the pubic symphysis.  Oral contrast was not administered. COMPARISON: 04/10/2023 FINDINGS: There is patchy airspace disease and some interlobular septal thickening in the lung bases heart is enlarged and there is also pericardial fluid.  The tip a venous access catheter is present in the right atrium.  Cholecystectomy. Liver is 20 cm in length and spleen is 14 cm in length.  Remaining solid abdominal organs are grossly unremarkable on this noncontrast exam. There is no enteric contrast which limits bowel assessment.  Stomach is mildly distended.  Moderate degree of stool throughout the colon. Extensive atherosclerosis.  Unremarkable noncontrast uterus.  There is mild diffuse urinary bladder wall thickening. No acute osseous findings.     1. Urinary bladder wall thickening is nonspecific.  Cystitis and outlet obstruction are 2 considerations. 2. Colonic constipation is possible. 3. Hepatosplenomegaly. 4. Cardiomegaly with pericardial effusion. 5. Bibasilar airspace disease could reflect edema or multifocal pneumonia. Electronically signed by: Kaushik Gutierrez Date:    04/21/2023 Time:    08:47      Echocardiogram  Summary     The left ventricle is normal in size with moderate concentric hypertrophy and normal systolic function.   The estimated ejection fraction is 55%.   Grade III left ventricular diastolic dysfunction.   Normal right ventricular size with normal right ventricular systolic function.   Moderate left atrial enlargement.   Moderate to severe tricuspid regurgitation.   Mild to moderate pulmonic regurgitation.   Mild mitral regurgitation.   Normal central venous pressure (3 mmHg).   The estimated PA systolic pressure is 56 mmHg.   There is pulmonary hypertension.   Moderate to large circumferential pericardial effusion. No evidence of tamponade.    Assessment/Plan:      Active Hospital Problems    Diagnosis    *Abdominal pain    ESRD (end stage renal disease) on dialysis    Hyponatremia    Hyperkalemia    Chronic deep vein thrombosis (DVT) of left upper extremity    Mood disorder    Anemia in ESRD (end-stage renal disease)    Frequent hospital admissions    Type 2 diabetes mellitus with hyperglycemia, with long-term current use of insulin, previous HbA1c 5.8%    Uncontrolled hypertension    Seizure disorder    Gastroparesis - suspected, unconfirmed    Sickle cell trait    Pericardial effusion     Plan:  Admit observation, medical floor  Full liquid diabetic diet, advanced as tolerated  Dialysis planned today by Nephrology as per regular schedule, TTS  Type and screen ordered, will discussed with Nephrology transfusion with dialysis   Chronic abdominal pain, multiple CT scans, lipase normal, suspected gastroparesis but has not had gastric emptying study, check lactic acid for completeness, scheduled stool softener with constipation on previous CT, continue PPI and Carafate, continue gabapentin and amitriptyline as recommended during previous admission  Serial abdominal examination  History of upper extremity DVT, on Eliquis   Insulin sliding scale with Accu-Cheks, as needed hypoglycemic measures  Resume home antihypertensive and following blood pressure   Previous echocardiogram with moderate to large pericardial effusion, EF of 55% with grade 3 diastolic dysfunction, repeat limited echo re-evaluate effusion   Renally dosing all medications and avoid nephrotoxin drugs  A.m. labs ordered  Nephrology consulted, ESRD   Gastroenterology consulted, abdominal pain  Further plan as per hospital course    VTE Risk Mitigation (From admission, onward)         Ordered     apixaban tablet 2.5 mg  2 times daily         04/22/23 1641                   On 04/22/2023, patient should be placed in hospital observation services under my care.        Suzy Bobo MD  Department  of Hospital Medicine  Formerly [facility] - Emergency Dept

## 2023-04-22 NOTE — ED PROVIDER NOTES
"Encounter Date: 2023       History     Chief Complaint   Patient presents with    Abdominal Pain     Abdominal pain with associated n/v starting yesterday. Pt seen in ed in the early am hours and dx with gastroparesis.      33-year-old female, history of diabetes mellitus, end-stage renal disease on dialysis, gastroparesis, and hypertension, here from home via EMS complaining of generalized abdominal pain.  Patient was seen here within the last 24 hours for the same, and was seen here a few days prior to that for the same complaints.  Patient is due for dialysis today.  She denies any respiratory difficulties.  She states she had a normal bowel movement yesterday evening.  She does not make urine.  She denies any fever or chills.  No dark or bloody vomitus.  No fever.  EMS reports glucose was read as "high".    Review of patient's allergies indicates:   Allergen Reactions    Penicillins Hives     Past Medical History:   Diagnosis Date    ESRD on hemodialysis 2022    Gastritis 2022    EGD was 21    Gastroparesis 2022    has not had Emptying study    Heart failure with preserved ejection fraction 2022    EF 55% on 3/22    History of supraventricular tachycardia     Hyperkalemia 2022    Hypertensive emergency 2022    Sickle cell trait 2022    Type 2 diabetes mellitus      Past Surgical History:   Procedure Laterality Date     SECTION      x 3    COLONOSCOPY      COLONOSCOPY N/A 2022    Procedure: COLONOSCOPY;  Surgeon: Jagdeep Cedeno MD;  Location: [facility];  Service: Endoscopy;  Laterality: N/A;    ESOPHAGOGASTRODUODENOSCOPY N/A 10/18/2019    Procedure: ESOPHAGOGASTRODUODENOSCOPY (EGD);  Surgeon: Gianluca Mendez MD;  Location: [facility];  Service: Endoscopy;  Laterality: N/A;    ESOPHAGOGASTRODUODENOSCOPY N/A 2022    Procedure: EGD (ESOPHAGOGASTRODUODENOSCOPY);  Surgeon: Micky Paredes III, MD;  Location: [facility];  Service: Endoscopy;  " Laterality: N/A;    ESOPHAGOGASTRODUODENOSCOPY N/A 12/5/2022    Procedure: EGD (ESOPHAGOGASTRODUODENOSCOPY);  Surgeon: Marcelo Zhong MD;  Location: [facility];  Service: Endoscopy;  Laterality: N/A;    LAPAROSCOPIC CHOLECYSTECTOMY N/A 07/30/2022    Procedure: CHOLECYSTECTOMY, LAPAROSCOPIC;  Surgeon: Grey Perez MD;  Location: [facility] OR;  Service: General;  Laterality: N/A;    PLACEMENT OF DUAL-LUMEN VASCULAR CATHETER Left 07/12/2022    Procedure: INSERTION-CATHETER-JOSEPH;  Surgeon: Dionte Gan MD;  Location: [facility] OR;  Service: General;  Laterality: Left;    PLACEMENT OF DUAL-LUMEN VASCULAR CATHETER Right 07/26/2022    Procedure: INSERTION-CATHETER-Hemosplit;  Surgeon: Dionte Gan MD;  Location: [facility] OR;  Service: General;  Laterality: Right;     Family History   Problem Relation Age of Onset    Diabetes Mother     Diabetes Father      Social History     Tobacco Use    Smoking status: Never    Smokeless tobacco: Never   Substance Use Topics    Alcohol use: Not Currently    Drug use: No     Review of Systems   Constitutional:  Negative for chills and fever.   HENT:  Negative for congestion, rhinorrhea and sore throat.    Eyes: Negative.    Respiratory: Negative.     Cardiovascular: Negative.    Gastrointestinal:  Positive for abdominal pain, nausea and vomiting.   Endocrine: Negative.    Genitourinary: Negative.    Musculoskeletal: Negative.    Skin: Negative.    Allergic/Immunologic: Negative.    Neurological: Negative.    Psychiatric/Behavioral: Negative.       Physical Exam     Initial Vitals [04/22/23 0817]   BP Pulse Resp Temp SpO2   (!) 169/116 94 (!) 25 98.2 °F (36.8 °C) 97 %      MAP       --         Physical Exam    Nursing note and vitals reviewed.  Constitutional: She appears well-developed and well-nourished. She appears distressed.   HENT:   Head: Normocephalic and atraumatic.   Nose: Nose normal.   Mouth/Throat: Oropharynx is clear and moist.   Eyes: Conjunctivae and EOM are normal.  Pupils are equal, round, and reactive to light. No scleral icterus.   Neck: Neck supple. No JVD present.   Normal range of motion.  Cardiovascular:  Normal rate, regular rhythm, normal heart sounds and intact distal pulses.           No murmur heard.  Pulmonary/Chest: Breath sounds normal. No stridor. No respiratory distress. She has no wheezes.   Abdominal: Abdomen is soft.   Abdomen is soft.  She describes generalized tenderness when the abdomen is palpated.  Bowel sounds are present but diminished.  No specific point tenderness.  No masses appreciated.   Musculoskeletal:         General: No tenderness or edema. Normal range of motion.      Cervical back: Normal range of motion and neck supple.     Neurological: She is alert and oriented to person, place, and time. She has normal strength. No cranial nerve deficit or sensory deficit. GCS score is 15. GCS eye subscore is 4. GCS verbal subscore is 5. GCS motor subscore is 6.   Skin: Skin is warm and dry. Capillary refill takes less than 2 seconds. No rash noted. No erythema.   Psychiatric:   Patient is anxious, tearful       ED Course   Procedures  Labs Reviewed   CBC W/ AUTO DIFFERENTIAL - Abnormal; Notable for the following components:       Result Value    WBC 3.73 (*)     RBC 2.56 (*)     Hemoglobin 7.5 (*)     Hematocrit 22.2 (*)     RDW 15.4 (*)     Platelets 75 (*)     Lymph # 0.5 (*)     Gran % 75.9 (*)     Lymph % 13.1 (*)     All other components within normal limits   COMPREHENSIVE METABOLIC PANEL - Abnormal; Notable for the following components:    Sodium 130 (*)     Potassium 5.4 (*)     Chloride 89 (*)     Glucose 718 (*)     BUN 51 (*)     Creatinine 5.7 (*)     Albumin 3.4 (*)     Total Bilirubin 2.0 (*)     Alkaline Phosphatase 403 (*)     AST 53 (*)     ALT 63 (*)     eGFR 9.4 (*)     All other components within normal limits    Narrative:     kai gonzalez rn by KDJUAN 04/22/2023 09:14   PHOSPHORUS - Abnormal; Notable for the following components:     Phosphorus 5.4 (*)     All other components within normal limits   POCT GLUCOSE - Abnormal; Notable for the following components:    POCT Glucose >500 (*)     All other components within normal limits   POCT GLUCOSE - Abnormal; Notable for the following components:    POCT Glucose 383 (*)     All other components within normal limits   LIPASE   MAGNESIUM   ACETONE          Imaging Results              X-Ray Abdomen AP 1 View (KUB) (Final result)  Result time 04/22/23 09:51:38      Final result by Gianluca Arriaga Jr., MD (04/22/23 09:51:38)                   Impression:      Prior cholecystectomy.  Otherwise negative abdomen x-ray.      Electronically signed by: Gianluca Arriaga MD  Date:    04/22/2023  Time:    09:51               Narrative:    EXAMINATION:  XR ABDOMEN AP 1 VIEW    CLINICAL HISTORY:  Generalized abdominal pain    TECHNIQUE:  AP View(s) of the abdomen was performed.    COMPARISON:  Abdomen x-ray of March 13, 2023    FINDINGS:  The bowel gas pattern is within normal limits.  Surgical clips in the right upper quadrant are consistent with prior cholecystectomy.  No free air is seen.  No masses or calcifications are identified.                                       X-Ray Chest AP Portable (Final result)  Result time 04/22/23 09:52:20      Final result by Gianluca Arriaga Jr., MD (04/22/23 09:52:20)                   Impression:      Moderate cardiomegaly.  Double-lumen central line ends at the level of the right atrium.      Electronically signed by: Gianluca Arriaga MD  Date:    04/22/2023  Time:    09:52               Narrative:    EXAMINATION:  XR CHEST AP PORTABLE    CLINICAL HISTORY:  Shortness of breath    TECHNIQUE:  Single frontal view of the chest was performed.    COMPARISON:  Chest x-ray of April 17, 2022    FINDINGS:  A double lumen central line enters on the right and ends at the level of the right atrium.  There is cardiomegaly in a biventricular pattern.  Intrapulmonary mass or  infiltrate is not seen.  There is no pneumothorax or pleural effusion.                                    X-Rays:   Independently Interpreted Readings:   Other Readings:  X-ray chest, personally reviewed by me shows cardiomegaly, clear lungs, normal skeletal structures, dialysis port in right upper chest wall.    X-ray abdomen, personally reviewed by me, shows normal bowel gas pattern, prior cholecystectomy, no evidence of bowel obstruction.  Medications   prochlorperazine injection Soln 10 mg (10 mg Intravenous Given 4/22/23 0831)   haloperidol lactate injection 5 mg (5 mg Intravenous Given 4/22/23 0831)   insulin regular injection 10 Units 0.1 mL (10 Units Intravenous Given 4/22/23 0858)     Medical Decision Making:   Differential Diagnosis:   Gastritis, gastroparesis, bowel obstruction, pancreatitis, hyperglycemia, DKA, electrolyte abnormalities, etc.  ED Management:  Patient was given 5 mg Haldol and 10 mg Compazine for her symptoms, with excellent relief.  Were showed white count 3.73, hemoglobin 7.5, hematocrit 22, platelets 56345.  Sodium 130, potassium 5.4, BUN 51, creatinine 5.7.  Glucose was 718.  Acetone negative.  After being given 10 mg IV insulin, glucose came down to 383.  Patient has been sleeping peacefully.  X-ray of the abdomen was normal.  Believe patient is safe for discharge home.  She was told she needs to follow-up with Nephrology for dialysis, continue all previously prescribed medications, and return here as needed or if worse in any way.                        Clinical Impression:   Final diagnoses:  [R10.84] Generalized abdominal pain  [R06.02] Shortness of breath        ED Disposition Condition    Discharge Stable          ED Prescriptions    None       Follow-up Information    None          Ad Berman MD  04/22/23 1223       Ad Berman MD  05/02/23 0636       Ad Bermna MD  05/02/23 0637

## 2023-04-22 NOTE — PROGRESS NOTES
Pharmacist Renal Dose Adjustment Note    Tabby Howard is a 33 y.o. female being treated with the medication Apixaban.    Patient Data:    Vital Signs (Most Recent):  Temp: 97.6 °F (36.4 °C) (04/22/23 1446)  Pulse: 92 (04/22/23 1446)  Resp: 20 (04/22/23 1446)  BP: (!) 187/121 (04/22/23 1446)  SpO2: 96 % (04/22/23 1446)   Vital Signs (72h Range):  Temp:  [97.6 °F (36.4 °C)-98.2 °F (36.8 °C)]   Pulse:  []   Resp:  [15-25]   BP: (147-200)/()   SpO2:  [93 %-98 %]      Recent Labs   Lab 04/17/23  0958 04/21/23  0455 04/22/23  0829   CREATININE 4.8* 3.9* 5.7*     Serum creatinine: 5.7 mg/dL (H) 04/22/23 0829  Estimated creatinine clearance: 11 mL/min (A)    Apixaban 5 mg po BID will be changed to Apixaban 2.5 mg po BID due to serum creatinine > 1.5 mg/dl and weight < 60 kg.    Pharmacist's Name: Noreen Yu  Pharmacist's Extension: [phone]

## 2023-04-22 NOTE — ED NOTES
Pt sleeping upon entering room. Pt removed all remaining monitoring including the pulse ox. Pt still reporting abdominal pain.Dr. Berman notified.

## 2023-04-22 NOTE — ED NOTES
"Pt squirming side to side in the bed demanding to be taken to another hospital because "yall aren't doing anything for my pain." I informed patient that we were treating her with 2 different meds that worked for her in the past,however, patient screaming and states," You gave me dilaudid and morphine. I need something for pain now." I informed patient that the md is aware,however, is only ordering haldol and compazine for now. Pt aggravated but v/u.   "

## 2023-04-23 ENCOUNTER — CLINICAL SUPPORT (OUTPATIENT)
Dept: CARDIOLOGY | Facility: HOSPITAL | Age: 33
End: 2023-04-23
Attending: INTERNAL MEDICINE
Payer: MEDICAID

## 2023-04-23 VITALS — WEIGHT: 119.06 LBS | BODY MASS INDEX: 21.91 KG/M2 | HEIGHT: 62 IN

## 2023-04-23 LAB
ALBUMIN SERPL BCP-MCNC: 3.5 G/DL (ref 3.5–5.2)
ALP SERPL-CCNC: 355 U/L (ref 55–135)
ALT SERPL W/O P-5'-P-CCNC: 57 U/L (ref 10–44)
ANION GAP SERPL CALC-SCNC: 13 MMOL/L (ref 8–16)
AST SERPL-CCNC: 50 U/L (ref 10–40)
BASOPHILS # BLD AUTO: 0.03 K/UL (ref 0–0.2)
BASOPHILS # BLD AUTO: 0.05 K/UL (ref 0–0.2)
BASOPHILS NFR BLD: 0.6 % (ref 0–1.9)
BASOPHILS NFR BLD: 0.7 % (ref 0–1.9)
BILIRUB SERPL-MCNC: 3.7 MG/DL (ref 0.1–1)
BLD PROD TYP BPU: NORMAL
BLD PROD TYP BPU: NORMAL
BLOOD UNIT EXPIRATION DATE: NORMAL
BLOOD UNIT EXPIRATION DATE: NORMAL
BLOOD UNIT TYPE CODE: 6200
BLOOD UNIT TYPE CODE: 6200
BLOOD UNIT TYPE: NORMAL
BLOOD UNIT TYPE: NORMAL
BSA FOR ECHO PROCEDURE: 1.54 M2
BUN SERPL-MCNC: 30 MG/DL (ref 6–20)
CALCIUM SERPL-MCNC: 9 MG/DL (ref 8.7–10.5)
CHLORIDE SERPL-SCNC: 97 MMOL/L (ref 95–110)
CO2 SERPL-SCNC: 23 MMOL/L (ref 23–29)
CODING SYSTEM: NORMAL
CODING SYSTEM: NORMAL
CREAT SERPL-MCNC: 3.4 MG/DL (ref 0.5–1.4)
CROSSMATCH INTERPRETATION: NORMAL
CROSSMATCH INTERPRETATION: NORMAL
DIFFERENTIAL METHOD: ABNORMAL
DIFFERENTIAL METHOD: ABNORMAL
DISPENSE STATUS: NORMAL
DISPENSE STATUS: NORMAL
EJECTION FRACTION: 50 %
EOSINOPHIL # BLD AUTO: 0.1 K/UL (ref 0–0.5)
EOSINOPHIL # BLD AUTO: 0.1 K/UL (ref 0–0.5)
EOSINOPHIL NFR BLD: 1.2 % (ref 0–8)
EOSINOPHIL NFR BLD: 1.5 % (ref 0–8)
ERYTHROCYTE [DISTWIDTH] IN BLOOD BY AUTOMATED COUNT: 16.2 % (ref 11.5–14.5)
ERYTHROCYTE [DISTWIDTH] IN BLOOD BY AUTOMATED COUNT: 16.6 % (ref 11.5–14.5)
EST. GFR  (NO RACE VARIABLE): 17.5 ML/MIN/1.73 M^2
GLUCOSE SERPL-MCNC: 189 MG/DL (ref 70–110)
GLUCOSE SERPL-MCNC: 290 MG/DL (ref 70–110)
GLUCOSE SERPL-MCNC: 325 MG/DL (ref 70–110)
GLUCOSE SERPL-MCNC: 339 MG/DL (ref 70–110)
HCT VFR BLD AUTO: 19.9 % (ref 37–48.5)
HCT VFR BLD AUTO: 30.3 % (ref 37–48.5)
HGB BLD-MCNC: 10.5 G/DL (ref 12–16)
HGB BLD-MCNC: 6.7 G/DL (ref 12–16)
IMM GRANULOCYTES # BLD AUTO: 0.03 K/UL (ref 0–0.04)
IMM GRANULOCYTES # BLD AUTO: 0.03 K/UL (ref 0–0.04)
IMM GRANULOCYTES NFR BLD AUTO: 0.4 % (ref 0–0.5)
IMM GRANULOCYTES NFR BLD AUTO: 0.6 % (ref 0–0.5)
LYMPHOCYTES # BLD AUTO: 0.6 K/UL (ref 1–4.8)
LYMPHOCYTES # BLD AUTO: 1 K/UL (ref 1–4.8)
LYMPHOCYTES NFR BLD: 11.4 % (ref 18–48)
LYMPHOCYTES NFR BLD: 13.5 % (ref 18–48)
MAGNESIUM SERPL-MCNC: 1.9 MG/DL (ref 1.6–2.6)
MCH RBC QN AUTO: 29.3 PG (ref 27–31)
MCH RBC QN AUTO: 30.2 PG (ref 27–31)
MCHC RBC AUTO-ENTMCNC: 33.7 G/DL (ref 32–36)
MCHC RBC AUTO-ENTMCNC: 34.7 G/DL (ref 32–36)
MCV RBC AUTO: 87 FL (ref 82–98)
MCV RBC AUTO: 87 FL (ref 82–98)
MONOCYTES # BLD AUTO: 0.3 K/UL (ref 0.3–1)
MONOCYTES # BLD AUTO: 0.8 K/UL (ref 0.3–1)
MONOCYTES NFR BLD: 10 % (ref 4–15)
MONOCYTES NFR BLD: 6.6 % (ref 4–15)
NEUTROPHILS # BLD AUTO: 4.1 K/UL (ref 1.8–7.7)
NEUTROPHILS # BLD AUTO: 5.6 K/UL (ref 1.8–7.7)
NEUTROPHILS NFR BLD: 73.9 % (ref 38–73)
NEUTROPHILS NFR BLD: 79.6 % (ref 38–73)
NRBC BLD-RTO: 0 /100 WBC
NRBC BLD-RTO: 1 /100 WBC
NUM UNITS TRANS PACKED RBC: NORMAL
NUM UNITS TRANS PACKED RBC: NORMAL
PLATELET # BLD AUTO: 57 K/UL (ref 150–450)
PLATELET # BLD AUTO: 83 K/UL (ref 150–450)
PMV BLD AUTO: 10 FL (ref 9.2–12.9)
PMV BLD AUTO: 8.8 FL (ref 9.2–12.9)
POTASSIUM SERPL-SCNC: 3.7 MMOL/L (ref 3.5–5.1)
PROT SERPL-MCNC: 7.4 G/DL (ref 6–8.4)
RA PRESSURE: 8 MMHG
RBC # BLD AUTO: 2.29 M/UL (ref 4–5.4)
RBC # BLD AUTO: 3.48 M/UL (ref 4–5.4)
SODIUM SERPL-SCNC: 133 MMOL/L (ref 136–145)
WBC # BLD AUTO: 5.18 K/UL (ref 3.9–12.7)
WBC # BLD AUTO: 7.5 K/UL (ref 3.9–12.7)

## 2023-04-23 PROCEDURE — 93321 ECHO (CUPID ONLY): ICD-10-PCS | Mod: 26,,, | Performed by: SPECIALIST

## 2023-04-23 PROCEDURE — 83735 ASSAY OF MAGNESIUM: CPT | Performed by: INTERNAL MEDICINE

## 2023-04-23 PROCEDURE — C9113 INJ PANTOPRAZOLE SODIUM, VIA: HCPCS | Performed by: INTERNAL MEDICINE

## 2023-04-23 PROCEDURE — 25000003 PHARM REV CODE 250: Performed by: INTERNAL MEDICINE

## 2023-04-23 PROCEDURE — 96375 TX/PRO/DX INJ NEW DRUG ADDON: CPT | Mod: 59

## 2023-04-23 PROCEDURE — 63600175 PHARM REV CODE 636 W HCPCS: Performed by: INTERNAL MEDICINE

## 2023-04-23 PROCEDURE — 93010 ELECTROCARDIOGRAM REPORT: CPT | Mod: ,,, | Performed by: SPECIALIST

## 2023-04-23 PROCEDURE — P9016 RBC LEUKOCYTES REDUCED: HCPCS | Performed by: NURSE PRACTITIONER

## 2023-04-23 PROCEDURE — 90935 HEMODIALYSIS ONE EVALUATION: CPT

## 2023-04-23 PROCEDURE — G0378 HOSPITAL OBSERVATION PER HR: HCPCS

## 2023-04-23 PROCEDURE — P9016 RBC LEUKOCYTES REDUCED: HCPCS | Performed by: INTERNAL MEDICINE

## 2023-04-23 PROCEDURE — 36415 COLL VENOUS BLD VENIPUNCTURE: CPT | Performed by: HOSPITALIST

## 2023-04-23 PROCEDURE — 85025 COMPLETE CBC W/AUTO DIFF WBC: CPT | Performed by: INTERNAL MEDICINE

## 2023-04-23 PROCEDURE — 93308 TTE F-UP OR LMTD: CPT

## 2023-04-23 PROCEDURE — 93321 DOPPLER ECHO F-UP/LMTD STD: CPT | Mod: 26,,, | Performed by: SPECIALIST

## 2023-04-23 PROCEDURE — 96372 THER/PROPH/DIAG INJ SC/IM: CPT | Performed by: INTERNAL MEDICINE

## 2023-04-23 PROCEDURE — 93010 EKG 12-LEAD: ICD-10-PCS | Mod: ,,, | Performed by: SPECIALIST

## 2023-04-23 PROCEDURE — 36415 COLL VENOUS BLD VENIPUNCTURE: CPT | Performed by: INTERNAL MEDICINE

## 2023-04-23 PROCEDURE — 93308 TTE F-UP OR LMTD: CPT | Mod: 26,,, | Performed by: SPECIALIST

## 2023-04-23 PROCEDURE — 96376 TX/PRO/DX INJ SAME DRUG ADON: CPT

## 2023-04-23 PROCEDURE — 80053 COMPREHEN METABOLIC PANEL: CPT | Performed by: INTERNAL MEDICINE

## 2023-04-23 PROCEDURE — 93308 ECHO (CUPID ONLY): ICD-10-PCS | Mod: 26,,, | Performed by: SPECIALIST

## 2023-04-23 PROCEDURE — 36430 TRANSFUSION BLD/BLD COMPNT: CPT

## 2023-04-23 PROCEDURE — 93005 ELECTROCARDIOGRAM TRACING: CPT | Performed by: SPECIALIST

## 2023-04-23 PROCEDURE — 85025 COMPLETE CBC W/AUTO DIFF WBC: CPT | Mod: 91 | Performed by: HOSPITALIST

## 2023-04-23 RX ORDER — TRAZODONE HYDROCHLORIDE 50 MG/1
50 TABLET ORAL NIGHTLY PRN
Status: DISCONTINUED | OUTPATIENT
Start: 2023-04-23 | End: 2023-04-25 | Stop reason: HOSPADM

## 2023-04-23 RX ORDER — INSULIN ASPART 100 [IU]/ML
4 INJECTION, SOLUTION INTRAVENOUS; SUBCUTANEOUS
Status: DISCONTINUED | OUTPATIENT
Start: 2023-04-24 | End: 2023-04-25 | Stop reason: HOSPADM

## 2023-04-23 RX ORDER — AMLODIPINE BESYLATE 5 MG/1
10 TABLET ORAL DAILY
Qty: 30 TABLET | Refills: 0 | Status: SHIPPED | OUTPATIENT
Start: 2023-04-23 | End: 2023-06-12 | Stop reason: SDUPTHER

## 2023-04-23 RX ORDER — AMLODIPINE BESYLATE 5 MG/1
10 TABLET ORAL DAILY
Status: DISCONTINUED | OUTPATIENT
Start: 2023-04-24 | End: 2023-04-25 | Stop reason: HOSPADM

## 2023-04-23 RX ORDER — HYDROCODONE BITARTRATE AND ACETAMINOPHEN 500; 5 MG/1; MG/1
TABLET ORAL
Status: DISCONTINUED | OUTPATIENT
Start: 2023-04-23 | End: 2023-04-25 | Stop reason: HOSPADM

## 2023-04-23 RX ORDER — CLONIDINE HYDROCHLORIDE 0.1 MG/1
0.1 TABLET ORAL EVERY 4 HOURS PRN
Status: DISCONTINUED | OUTPATIENT
Start: 2023-04-23 | End: 2023-04-25 | Stop reason: HOSPADM

## 2023-04-23 RX ADMIN — SUCRALFATE 1 G: 1 SUSPENSION ORAL at 11:04

## 2023-04-23 RX ADMIN — SUCRALFATE 1 G: 1 SUSPENSION ORAL at 04:04

## 2023-04-23 RX ADMIN — CLONIDINE HYDROCHLORIDE 0.1 MG: 0.1 TABLET ORAL at 04:04

## 2023-04-23 RX ADMIN — GABAPENTIN 100 MG: 100 CAPSULE ORAL at 08:04

## 2023-04-23 RX ADMIN — MUPIROCIN: 20 OINTMENT TOPICAL at 09:04

## 2023-04-23 RX ADMIN — LEVETIRACETAM 500 MG: 500 TABLET, FILM COATED ORAL at 09:04

## 2023-04-23 RX ADMIN — FLUOXETINE 20 MG: 20 CAPSULE ORAL at 08:04

## 2023-04-23 RX ADMIN — CARVEDILOL 25 MG: 12.5 TABLET, FILM COATED ORAL at 08:04

## 2023-04-23 RX ADMIN — HYDRALAZINE HYDROCHLORIDE 100 MG: 25 TABLET, FILM COATED ORAL at 09:04

## 2023-04-23 RX ADMIN — AMITRIPTYLINE HYDROCHLORIDE 50 MG: 25 TABLET, FILM COATED ORAL at 09:04

## 2023-04-23 RX ADMIN — INSULIN ASPART 1 UNITS: 100 INJECTION, SOLUTION INTRAVENOUS; SUBCUTANEOUS at 08:04

## 2023-04-23 RX ADMIN — CARVEDILOL 25 MG: 12.5 TABLET, FILM COATED ORAL at 07:04

## 2023-04-23 RX ADMIN — APIXABAN 2.5 MG: 2.5 TABLET, FILM COATED ORAL at 09:04

## 2023-04-23 RX ADMIN — CLONIDINE HYDROCHLORIDE 0.1 MG: 0.1 TABLET ORAL at 08:04

## 2023-04-23 RX ADMIN — SUCRALFATE 1 G: 1 SUSPENSION ORAL at 09:04

## 2023-04-23 RX ADMIN — AMLODIPINE BESYLATE 5 MG: 5 TABLET ORAL at 08:04

## 2023-04-23 RX ADMIN — HYDRALAZINE HYDROCHLORIDE 10 MG: 20 INJECTION INTRAMUSCULAR; INTRAVENOUS at 02:04

## 2023-04-23 RX ADMIN — INSULIN ASPART 8 UNITS: 100 INJECTION, SOLUTION INTRAVENOUS; SUBCUTANEOUS at 11:04

## 2023-04-23 RX ADMIN — PANTOPRAZOLE SODIUM 40 MG: 40 INJECTION, POWDER, LYOPHILIZED, FOR SOLUTION INTRAVENOUS at 09:04

## 2023-04-23 RX ADMIN — SENNOSIDES AND DOCUSATE SODIUM 2 TABLET: 50; 8.6 TABLET ORAL at 09:04

## 2023-04-23 RX ADMIN — PROCHLORPERAZINE EDISYLATE 5 MG: 5 INJECTION INTRAMUSCULAR; INTRAVENOUS at 02:04

## 2023-04-23 RX ADMIN — GABAPENTIN 100 MG: 100 CAPSULE ORAL at 09:04

## 2023-04-23 RX ADMIN — APIXABAN 2.5 MG: 2.5 TABLET, FILM COATED ORAL at 08:04

## 2023-04-23 RX ADMIN — LEVETIRACETAM 500 MG: 500 TABLET, FILM COATED ORAL at 08:04

## 2023-04-23 RX ADMIN — INSULIN ASPART 8 UNITS: 100 INJECTION, SOLUTION INTRAVENOUS; SUBCUTANEOUS at 04:04

## 2023-04-23 RX ADMIN — ISOSORBIDE MONONITRATE 60 MG: 60 TABLET, EXTENDED RELEASE ORAL at 08:04

## 2023-04-23 RX ADMIN — HYDRALAZINE HYDROCHLORIDE 100 MG: 25 TABLET, FILM COATED ORAL at 08:04

## 2023-04-23 NOTE — DISCHARGE INSTRUCTIONS
Please take all medication as prescribed.  Please follow-up with Nephrology for ongoing HD management.    I placed a referral for a GI appointment for evaluation of your chronic abdominal pain, nausea, and vomiting.  Please answer your phone when they call to get appointment lined up.    Please increase your amlodipine to 10 mg twice a day (two tablets), Hydralazine to every 8 hours, and Imdur to 60mg daily.

## 2023-04-23 NOTE — PROGRESS NOTES
Notified by RN that high blood pressure persists and patient has declined an IV.  Home blood pressure medication administered already. I've added a dose of Norvasc 5mg x 1 given bp 180s/110s.  Consideration for oral clonidine if persists in being an issue.    Addendum 4/23/23 at 8607:  Blood pressure spiked back up.  Patient was willing for IV.  IV hydralazine already ordered prn.  I added prn po clonidine while we were getting the IV in place. Patient remains anxious.  I added Trazadone to perhaps help her sleep.  RN later notified me of Hg 6.7.  I have ordered one unit of blood with HD if she was going to be dialyzed today.  If not, does Nephrology feel she can tolerate the blood without HD? Or can she be run for a shortened period of time to receive the blood?

## 2023-04-23 NOTE — PLAN OF CARE
[facility]  Initial Discharge Assessment       Primary Care Provider: AIDEN CONNER NP    Admission Diagnosis: Hyperkalemia [E87.5]    Admission Date: 4/22/2023  Expected Discharge Date:     Cm met with pt at bedside.  Verified information on face sheet.  Patient reported that she is on dialysis at [facility] on tues, thurs and saturdays.  Patient parents will transport home upon discharge.  Needs tbd.    Discharge Barriers Identified: None    Payor: MEDICAID / Plan: HE[place] BLUE [place] / Product Type: Managed Medicaid /     Extended Emergency Contact Information  Primary Emergency Contact: Tanya Howard  Address: [address] [place] [address], [place] [address] [facility]  Home Phone: [phone]  Mobile Phone: [phone]  Relation: Step parent  Preferred language: English   needed? No  Secondary Emergency Contact: Garcia Howard  Address: [address] [place] [address], [place] [address]  Mobile Phone: [phone]  Relation: Father  Preferred language: English   needed? No    Discharge Plan A: Home with family  Discharge Plan B: Home with family      [facility]  [address]  [facility] [place] [address]  Phone: [phone] Fax: [phone]      Initial Assessment (most recent)       Adult Discharge Assessment - 04/23/23 1222          Discharge Assessment    Assessment Type Discharge Planning Assessment     Confirmed/corrected address, phone number and insurance Yes     Confirmed Demographics Correct on Facesheet     Source of Information patient     Does patient/caregiver understand observation status --   n/a    Communicated TATIANA with patient/caregiver Date not available/Unable to determine     Reason For Admission Abdominal pain     People in Home parent(s)     Facility Arrived From: home     Do you expect to return to your current living situation? Yes     Do you have help at home or someone to help you manage your care at home? Yes     Who  are your caregiver(s) and their phone number(s)? Kavitha Howard (step mother) [phone], Meche Howard (father) [phone]     Prior to hospitilization cognitive status: Unable to Assess     Current cognitive status: Alert/Oriented     Equipment Currently Used at Home walker, rolling     Patient currently being followed by outpatient case management? No     Do you currently have service(s) that help you manage your care at home? No     Do you take prescription medications? Yes     Do you have prescription coverage? Yes     Coverage Payor:  MEDICAID - [place] ([facility])     Do you have any problems affording any of your prescribed medications? No     Is the patient taking medications as prescribed? yes     Who is going to help you get home at discharge? family     How do you get to doctors appointments? family or friend will provide     Are you on dialysis? Yes     Dialysis Name and Scheduled days [facility], thurs, sat     Do you take coumadin? No     Discharge Plan A Home with family     Discharge Plan B Home with family     DME Needed Upon Discharge  none     Discharge Plan discussed with: Patient     Discharge Barriers Identified None

## 2023-04-23 NOTE — HOSPITAL COURSE
Pt was monitored closely during her stay.  She had HTN which improved with resumption of medication.  She was dialyzed with resolution of her hyperkalemia.  Her H&H dropped.  I discussed with Nephrology.  She is not been getting her Epogen due to HTN and noncompliance.  She was transfused 2 units of packed red blood cells on HD.  She had no evidence of bleeding.  She complained of abdominal pain which is chronic.  She is undergone multiple CTs and was evaluated by GI just last week who recommended avoidance of narcotics.  An echo was done to re-evaluate her pericardial effusion which showed decrease in size with no evidence of tamponade.  Her H&H stabilized.  She continued to be hypertensive.  Her medications were adjusted with good resolve and normalization of BP.  She was also noted to be hyperglycemic.  Her home Levemir was restarted and she was treated with sliding scale insulin.  She did experience hypoglycemia which resolved well with oral intake.  An ambulatory referral was placed to gastroenterology for chronic abdominal pain with no inpatient acute needs for GI evaluation identified at this time.  Patient underwent dialysis prior to discharge.  Discharge instructions as well as return precautions were discussed with patient with good understanding.  Patient was evaluated on day of discharge and deemed appropriate.

## 2023-04-23 NOTE — CONSULTS
INPATIENT NEPHROLOGY CONSULT   [facility]    Patient Name: Tabby Howard  Date: 04/23/2023    Reason for consultation: ESRD    Chief Complaint:   Chief Complaint   Patient presents with    Abdominal Pain    Back Pain     X today. Pt missed dialysis today.        History of Present Illness:  Ms. Howard is a 33-years-old female who presented to the ED with chief complaint of generalized abdominal pain. She reports she developed generalized abdominal pain today, severe, persistent, as per chart review she was seen 2 days consecutively at [place] ED due to generalized abdominal pain, she states she went on those days due to chest pain with shortness of breath rather than abdominal pain.  Reportedly had normal bowel movement yesterday.  Known history of ESRD on HD TTS, states she did have last dialysis on Thursday.  Unclear of her medication, states her mother assist with same.  Of note patient has history of chronic abdominal pain, has had previous evaluation and multiple admissions for same, suspected gastroparesis but has not had emptying study, last admission was at [facility] [place] [facility] with recommendation for multimodal approach, gabapentin and amitriptyline were advised, however as per chart review patient perseverating on IV Dilaudid.  At outside hospital, CT abdomen and pelvis with concern for constipation, no other acute findings, KUB nonobstructive bowel gas pattern. She was given Haldol and Compazine but was further requesting pain medication and ultimately left [place] and presented to [facility] ED rather than outpatient dialysis.  Blood pressure elevated 187/112, on room air, afebrile.  Labs with hemoglobin 7.5, sodium 130, potassium 5.4, BUN/creatinine 51/5.7, alkaline phosphatase 403, lipase 42, glucose down to 383. Previous echocardiogram with EF of 55% with grade 3 diastolic dysfunction with moderate to severe tricuspid regurgitation with PASP 56 with pericardial effusion without tamponade.  ED provider  administered Ativan 0.4 mg. Consulted for dialysis.    Interval History:  - ordered HD/UF- got off 3L, had to give IV hyral during treatment due to high BP  - got IV BP meds overnight, Hb 6.7- will do HD/UF today with 2u of blood    Plan of Care:    Assessment:  ESRD on HD TTS  HTN urgency  Hyponatremia  Hyperkalemia  SHPT  Anemia of CKD    Plan:    - ordered HD TTS, supplemental HD/UF treatment today due to high BP and need for blood transfusion  - resume home BP meds- UF 3-4L- will adjust BP meds thereafter  - renal diet, 1.5L fluid restriction  - adjust dialysate  - phos at goal  - SHELLEY once BP better controlled    Thank you for allowing us to participate in this patient's care. We will continue to follow.    Vital Signs:  Temp Readings from Last 3 Encounters:   23 98.2 °F (36.8 °C) (Oral)   23 98.2 °F (36.8 °C)   23 98.2 °F (36.8 °C) (Oral)       Pulse Readings from Last 3 Encounters:   23 94   23 96   23 97       BP Readings from Last 3 Encounters:   23 (!) 184/117   23 (!) 182/102   23 (!) 184/120       Weight:  Wt Readings from Last 3 Encounters:   23 54 kg (119 lb)   23 54 kg (119 lb 0.8 oz)   23 54 kg (119 lb)       Past Medical & Surgical History:  Past Medical History:   Diagnosis Date    ESRD on hemodialysis 2022    Gastritis 2022    EGD was 21    Gastroparesis 2022    has not had Emptying study    Heart failure with preserved ejection fraction 2022    EF 55% on 3/22    History of supraventricular tachycardia     Hyperkalemia 2022    Hypertensive emergency 2022    Sickle cell trait 2022    Type 2 diabetes mellitus        Past Surgical History:   Procedure Laterality Date     SECTION      x 3    COLONOSCOPY      COLONOSCOPY N/A 2022    Procedure: COLONOSCOPY;  Surgeon: Jagdeep Cedeno MD;  Location: [facility];  Service: Endoscopy;  Laterality: N/A;     ESOPHAGOGASTRODUODENOSCOPY N/A 10/18/2019    Procedure: ESOPHAGOGASTRODUODENOSCOPY (EGD);  Surgeon: Gianluca Mendez MD;  Location: [facility];  Service: Endoscopy;  Laterality: N/A;    ESOPHAGOGASTRODUODENOSCOPY N/A 08/24/2022    Procedure: EGD (ESOPHAGOGASTRODUODENOSCOPY);  Surgeon: Micky Paredes III, MD;  Location: [facility] ENDO;  Service: Endoscopy;  Laterality: N/A;    ESOPHAGOGASTRODUODENOSCOPY N/A 12/5/2022    Procedure: EGD (ESOPHAGOGASTRODUODENOSCOPY);  Surgeon: Marcelo Zhong MD;  Location: [facility] ENDO;  Service: Endoscopy;  Laterality: N/A;    LAPAROSCOPIC CHOLECYSTECTOMY N/A 07/30/2022    Procedure: CHOLECYSTECTOMY, LAPAROSCOPIC;  Surgeon: Grey Perez MD;  Location: [facility] OR;  Service: General;  Laterality: N/A;    PLACEMENT OF DUAL-LUMEN VASCULAR CATHETER Left 07/12/2022    Procedure: INSERTION-CATHETER-JOSEPH;  Surgeon: Dionte Gan MD;  Location: [facility] OR;  Service: General;  Laterality: Left;    PLACEMENT OF DUAL-LUMEN VASCULAR CATHETER Right 07/26/2022    Procedure: INSERTION-CATHETER-Hemosplit;  Surgeon: Dionte Gan MD;  Location: [facility] OR;  Service: General;  Laterality: Right;       Past Social History:  Social History     Socioeconomic History    Marital status:    Tobacco Use    Smoking status: Never    Smokeless tobacco: Never   Substance and Sexual Activity    Alcohol use: Not Currently    Drug use: No    Sexual activity: Not Currently     Partners: Male     Birth control/protection: I.U.D.     Social Determinants of Health     Financial Resource Strain: Unknown    Difficulty of Paying Living Expenses: Patient refused   Food Insecurity: Unknown    Worried About Running Out of Food in the Last Year: Patient refused    Ran Out of Food in the Last Year: Patient refused   Transportation Needs: Unknown    Lack of Transportation (Medical): Patient refused    Lack of Transportation (Non-Medical): Patient refused   Physical Activity: Unknown    Days of Exercise per Week: Patient  refused    Minutes of Exercise per Session: Patient refused   Stress: Unknown    Feeling of Stress : Patient refused   Social Connections: Unknown    Frequency of Communication with Friends and Family: Patient refused    Frequency of Social Gatherings with Friends and Family: Patient refused    Attends Yazdanism Services: Patient refused    Active Member of Clubs or Organizations: Patient refused    Attends Club or Organization Meetings: Patient refused    Marital Status: Patient refused   Housing Stability: Unknown    Unable to Pay for Housing in the Last Year: Patient refused    Unstable Housing in the Last Year: Patient refused       Medications:  Scheduled Meds:   amitriptyline  50 mg Oral QHS    amLODIPine  5 mg Oral Daily    apixaban  2.5 mg Oral BID    carvediloL  25 mg Oral BID WM    FLUoxetine  20 mg Oral Daily    gabapentin  100 mg Oral TID    hydrALAZINE  100 mg Oral BID    isosorbide mononitrate  60 mg Oral Daily    levETIRAcetam  500 mg Oral BID    mupirocin   Nasal BID    pantoprazole  40 mg Intravenous Daily    senna-docusate 8.6-50 mg  2 tablet Oral BID    sucralfate  1 g Oral QID (AC & HS)     Continuous Infusions:  PRN Meds:.sodium chloride, acetaminophen, cloNIDine, dextrose 50%, dextrose 50%, dicyclomine, glucagon (human recombinant), glucose, glucose, hydrALAZINE, insulin aspart U-100, melatonin, naloxone, ondansetron, prochlorperazine, sodium chloride 0.9%, traZODone  Current Facility-Administered Medications on File Prior to Encounter   Medication Dose Route Frequency Provider Last Rate Last Admin    [DISCONTINUED] cloNIDine 0.2 mg/24 hr td ptwk 1 patch  1 patch Transdermal ED 1 Time Ad Berman MD   1 patch at 04/22/23 0911     Current Outpatient Medications on File Prior to Encounter   Medication Sig Dispense Refill    albuterol (PROVENTIL/VENTOLIN HFA) 90 mcg/actuation inhaler Inhale 2 puffs into the lungs every 6 (six) hours as needed for Wheezing. Rescue 18 g 3    amitriptyline  "(ELAVIL) 50 MG tablet Take 1 tablet (50 mg total) by mouth every evening. 30 tablet 6    amLODIPine (NORVASC) 5 MG tablet Take 1 tablet (5 mg total) by mouth once daily. 90 tablet 3    apixaban (ELIQUIS) 5 mg Tab Take 1 tablet (5 mg total) by mouth 2 (two) times daily. 60 tablet 2    carvediloL (COREG) 25 MG tablet Take 1 tablet twice a day by oral route for 30 days. 60 tablet 2    dicyclomine (BENTYL) 10 MG capsule Take 1 capsule (10 mg total) by mouth 4 (four) times daily as needed (abdominal pain). 20 capsule 0    FLUoxetine 20 MG capsule Take 1 capsule every day by oral route for 30 days. 30 capsule 2    gabapentin (NEURONTIN) 100 MG capsule Take 1 capsule by mouth 3 times daily 90 capsule 2    hydrALAZINE (APRESOLINE) 100 MG tablet Take 1 tablet twice a day by oral route for 30 days. 60 tablet 2    insulin aspart U-100 (NOVOLOG) 100 unit/mL (3 mL) InPn pen Inject 4 Units into the skin 3 (three) times daily with meals. 15 mL 3    insulin detemir U-100, Levemir, 100 unit/mL (3 mL) SubQ InPn pen Inject 6 Units into the skin 2 (two) times daily. 15 mL 3    isosorbide mononitrate (IMDUR) 60 MG 24 hr tablet Take 1 tablet every day by oral route for 30 days. 30 tablet 2    levETIRAcetam (KEPPRA) 500 MG Tab Take 1 tablet twice a day by oral route for 30 days. 60 tablet 2    ondansetron (ZOFRAN-ODT) 4 MG TbDL Take 1 tablet (4 mg total) by mouth every 6 (six) hours as needed. 20 tablet 0    pantoprazole (PROTONIX) 40 MG tablet Take 1 tablet every day by oral route for 30 days. 30 tablet 2    pen needle, diabetic 31 gauge x 3/16" Ndle Use as directed to inject insulin 5 times daily 100 each 11    sucralfate (CARAFATE) 100 mg/mL suspension Take 10 mLs (1 g total) by mouth 4 (four) times daily before meals and nightly. 1200 mL 0    [DISCONTINUED] atenoloL (TENORMIN) 50 MG tablet Take 1 tablet (50 mg total) by mouth every other day. 45 tablet 3    [DISCONTINUED] omeprazole (PRILOSEC) 20 MG capsule Take 2 capsules (40 mg " total) by mouth once daily. for 10 days 20 capsule 0       Allergies:  Penicillins    Past Family History:  Reviewed; refer to Hospitalist Admission Note    Review of Systems:  Review of Systems - All 14 systems reviewed and negative, except as noted in HPI    Physical Exam:  General Appearance:    NAD, AAO x 3, cooperative, appears stated age   Head:    Normocephalic, atraumatic   Eyes:    PER, EOMI, and conjunctiva/sclera clear bilaterally       Mouth:   Moist mucus membranes, no thrush or oral lesions,       normal dentition   Back:     No CVA tenderness   Lungs:     Clear to auscultation bilaterally, no wheezes, crackles,           rales or rhonchi, symmetric air movement, respirations unlabored   Chest wall:    No tenderness or deformity   Heart:    Regular rate and rhythm, S1 and S2 normal, no murmur, rub   or gallop   Abdomen:     Soft, non-tender, non-distended, bowel sounds active all four   quadrants, no RT or guarding, no masses, no organomegaly   Extremities:   Warm and well perfused, distal pulses are intact, no             cyanosis or peripheral edema   MSK:   No joint or muscle swelling, tenderness or deformity   Skin:   Skin color, texture, turgor normal, no rashes or lesions   Neurologic/Psychiatric:   CNII-XII intact, normal strength and sensation       throughout, no asterixis; normal affect, memory, judgement     and insight      Results:  Lab Results   Component Value Date     (L) 04/23/2023    K 3.7 04/23/2023    CL 97 04/23/2023    CO2 23 04/23/2023    BUN 30 (H) 04/23/2023    CREATININE 3.4 (H) 04/23/2023    CALCIUM 9.0 04/23/2023    ANIONGAP 13 04/23/2023    ESTGFRAFRICA 20 (A) 07/31/2022    EGFRNONAA 18 (A) 07/31/2022       Lab Results   Component Value Date    CALCIUM 9.0 04/23/2023    PHOS 5.4 (H) 04/22/2023       Recent Labs   Lab 04/23/23  0314   WBC 5.18   RBC 2.29*   HGB 6.7*   HCT 19.9*   PLT 57*   MCV 87   MCH 29.3   MCHC 33.7       I have personally reviewed pertinent  radiological imaging and reports.    I have spent > 70 minutes providing care for this patient for the above diagnoses. These services have included chart/data/imaging review, evaluation, exam, formulation of plan, , note preparation, and discussions with staff involved in this patient's care.    Prateek Clark MD MPH  [facility]  [address] [place] [id number]  [phone] (p)  [phone] (f)

## 2023-04-23 NOTE — NURSING
Patient observed sticking half her hand down her throat and gagging, patient was asked why is she doing this.  Patient continuing to cry saying she wants to go home.  Patient asked that we contact her dad and step mom, both told her that she  needed to stay here.  Patient's step mom asked if we could give here 300mg Gabapentin because that is what they give her at home, patient's step mom was told that patient is ordered 100mg Gabapentin.

## 2023-04-23 NOTE — NURSING
Attempted to start IV on patient, patient stated what's this for, educated so we can give meds if needed.  Patient declined if she couldn't be given IV pain meds.

## 2023-04-23 NOTE — NURSING
Patient transferred from dialysis to room, patient transferred to bed x 2.  Patient assisted to bathroom x 2.  Patient transferred back to bed.  Patient asked to call her father and step mom.  Patient lying in bed crying holding her stomach, turning back and forth in bed.  Bed alarm on and sounded when prompted, call light within easy reach, bed locked in lowest position.

## 2023-04-23 NOTE — PLAN OF CARE
Problem: Adult Inpatient Plan of Care  Goal: Plan of Care Review  Outcome: Ongoing, Progressing  Goal: Patient-Specific Goal (Individualized)  Outcome: Ongoing, Progressing  Goal: Absence of Hospital-Acquired Illness or Injury  Outcome: Ongoing, Progressing  Goal: Optimal Comfort and Wellbeing  Outcome: Ongoing, Progressing  Goal: Readiness for Transition of Care  Outcome: Ongoing, Progressing     Problem: Device-Related Complication Risk (Hemodialysis)  Goal: Safe, Effective Therapy Delivery  Outcome: Ongoing, Progressing     Problem: Hemodynamic Instability (Hemodialysis)  Goal: Effective Tissue Perfusion  Outcome: Ongoing, Progressing     Problem: Infection (Hemodialysis)  Goal: Absence of Infection Signs and Symptoms  Outcome: Ongoing, Progressing     Problem: Diabetes Comorbidity  Goal: Blood Glucose Level Within Targeted Range  Outcome: Ongoing, Progressing     Problem: Adjustment to Illness (Sepsis/Septic Shock)  Goal: Optimal Coping  Outcome: Ongoing, Progressing     Problem: Bleeding (Sepsis/Septic Shock)  Goal: Absence of Bleeding  Outcome: Ongoing, Progressing     Problem: Glycemic Control Impaired (Sepsis/Septic Shock)  Goal: Blood Glucose Level Within Desired Range  Outcome: Ongoing, Progressing     Problem: Infection Progression (Sepsis/Septic Shock)  Goal: Absence of Infection Signs and Symptoms  Outcome: Ongoing, Progressing     Problem: Nutrition Impaired (Sepsis/Septic Shock)  Goal: Optimal Nutrition Intake  Outcome: Ongoing, Progressing     Problem: Infection  Goal: Absence of Infection Signs and Symptoms  Outcome: Ongoing, Progressing

## 2023-04-23 NOTE — PROGRESS NOTES
HD tx tolerated fair  Net UF 3 L      Patient hypertensive, MD notified, orders received to administer IVP hydralazine on HD as pt has no IV presently  HD catheter dressing changed       04/22/23 1935   Handoff Report   Received From Stephanie Mena   Given To Adriana   Vital Signs   Temp 97.9 °F (36.6 °C)   Temp Source Oral   Pulse 94   Resp 20   SpO2 97 %   Device (Oxygen Therapy) room air   BP (!) 182/104   BP Location Right arm   BP Method Automatic   Patient Position Lying   Assessments (Pre/Post)   Blood Liters Processed (BLP) 58   Transport Modality stretcher   Level of Consciousness (AVPU) alert   Dialyzer Clearance moderately streaked        Hemodialysis Catheter 12/09/22 right subclavian   Placement Date: 12/09/22   Location: right subclavian   Line Necessity Review CRRT/HD   Site Assessment No drainage;No redness;No swelling;No warmth   Line Securement Device Secured with sutures   Dressing Type Central line dressing   Dressing Status Clean;Dry;Intact   Dressing Intervention Integrity maintained   Date on Dressing 04/22/23   Dressing Due to be Changed 04/29/23   Venous Patency/Care flushed w/o difficulty;heparin locked   Arterial Patency/Care flushed w/o difficulty;heparin locked   Post-Hemodialysis Assessment   Rinseback Volume (mL) 250 mL   Blood Volume Processed (Liters) 58 L   Dialyzer Clearance Moderately streaked   Duration of Treatment 180 minutes   Additional Fluid Intake (mL) 500 mL   Total UF (mL) 3500 mL   Net Fluid Removal 3000   Patient Response to Treatment Tolerated   Post-Treatment Weight   (Pt on stretcher, floor to weigh)   Post-Hemodialysis Comments Tx complete, all blood reinfused per protocol

## 2023-04-23 NOTE — PLAN OF CARE
Patient cleared to discharge by case management stand point.  Patient discharging home self care no needs.         04/23/23 1520   Final Note   Assessment Type Final Discharge Note   Anticipated Discharge Disposition Home   Post-Acute Status   Discharge Delays None known at this time

## 2023-04-24 LAB
ALBUMIN SERPL BCP-MCNC: 3.1 G/DL (ref 3.5–5.2)
ALP SERPL-CCNC: 338 U/L (ref 55–135)
ALT SERPL W/O P-5'-P-CCNC: 44 U/L (ref 10–44)
ANION GAP SERPL CALC-SCNC: 10 MMOL/L (ref 8–16)
AST SERPL-CCNC: 37 U/L (ref 10–40)
BILIRUB SERPL-MCNC: 2.2 MG/DL (ref 0.1–1)
BUN SERPL-MCNC: 23 MG/DL (ref 6–20)
CALCIUM SERPL-MCNC: 8.9 MG/DL (ref 8.7–10.5)
CHLORIDE SERPL-SCNC: 95 MMOL/L (ref 95–110)
CO2 SERPL-SCNC: 26 MMOL/L (ref 23–29)
CREAT SERPL-MCNC: 3.2 MG/DL (ref 0.5–1.4)
ERYTHROCYTE [DISTWIDTH] IN BLOOD BY AUTOMATED COUNT: 17 % (ref 11.5–14.5)
EST. GFR  (NO RACE VARIABLE): 18.8 ML/MIN/1.73 M^2
GLUCOSE SERPL-MCNC: 199 MG/DL (ref 70–110)
GLUCOSE SERPL-MCNC: 356 MG/DL (ref 70–110)
GLUCOSE SERPL-MCNC: 438 MG/DL (ref 70–110)
GLUCOSE SERPL-MCNC: 503 MG/DL (ref 70–110)
GLUCOSE SERPL-MCNC: 513 MG/DL (ref 70–110)
GLUCOSE SERPL-MCNC: 58 MG/DL (ref 70–110)
GLUCOSE SERPL-MCNC: 59 MG/DL (ref 70–110)
HCT VFR BLD AUTO: 30 % (ref 37–48.5)
HGB BLD-MCNC: 10.3 G/DL (ref 12–16)
MCH RBC QN AUTO: 30.1 PG (ref 27–31)
MCHC RBC AUTO-ENTMCNC: 34.3 G/DL (ref 32–36)
MCV RBC AUTO: 88 FL (ref 82–98)
PLATELET # BLD AUTO: 61 K/UL (ref 150–450)
PMV BLD AUTO: 8.5 FL (ref 9.2–12.9)
POTASSIUM SERPL-SCNC: 3.8 MMOL/L (ref 3.5–5.1)
PROT SERPL-MCNC: 7 G/DL (ref 6–8.4)
RBC # BLD AUTO: 3.42 M/UL (ref 4–5.4)
SODIUM SERPL-SCNC: 131 MMOL/L (ref 136–145)
WBC # BLD AUTO: 5.61 K/UL (ref 3.9–12.7)

## 2023-04-24 PROCEDURE — 25000003 PHARM REV CODE 250: Performed by: INTERNAL MEDICINE

## 2023-04-24 PROCEDURE — G0378 HOSPITAL OBSERVATION PER HR: HCPCS

## 2023-04-24 PROCEDURE — C9113 INJ PANTOPRAZOLE SODIUM, VIA: HCPCS | Performed by: INTERNAL MEDICINE

## 2023-04-24 PROCEDURE — 63600175 PHARM REV CODE 636 W HCPCS: Performed by: INTERNAL MEDICINE

## 2023-04-24 PROCEDURE — 96372 THER/PROPH/DIAG INJ SC/IM: CPT | Mod: 59 | Performed by: NURSE PRACTITIONER

## 2023-04-24 PROCEDURE — 63600175 PHARM REV CODE 636 W HCPCS: Performed by: NURSE PRACTITIONER

## 2023-04-24 PROCEDURE — 80053 COMPREHEN METABOLIC PANEL: CPT | Performed by: NURSE PRACTITIONER

## 2023-04-24 PROCEDURE — 85027 COMPLETE CBC AUTOMATED: CPT | Performed by: NURSE PRACTITIONER

## 2023-04-24 PROCEDURE — 96372 THER/PROPH/DIAG INJ SC/IM: CPT | Performed by: NURSE PRACTITIONER

## 2023-04-24 PROCEDURE — 25000003 PHARM REV CODE 250: Performed by: NURSE PRACTITIONER

## 2023-04-24 PROCEDURE — 96376 TX/PRO/DX INJ SAME DRUG ADON: CPT | Mod: 59

## 2023-04-24 PROCEDURE — 36415 COLL VENOUS BLD VENIPUNCTURE: CPT | Performed by: NURSE PRACTITIONER

## 2023-04-24 RX ORDER — INSULIN ASPART 100 [IU]/ML
10 INJECTION, SOLUTION INTRAVENOUS; SUBCUTANEOUS ONCE
Status: DISCONTINUED | OUTPATIENT
Start: 2023-04-24 | End: 2023-04-24

## 2023-04-24 RX ORDER — HYDRALAZINE HYDROCHLORIDE 25 MG/1
100 TABLET, FILM COATED ORAL EVERY 8 HOURS
Status: DISCONTINUED | OUTPATIENT
Start: 2023-04-24 | End: 2023-04-25 | Stop reason: HOSPADM

## 2023-04-24 RX ORDER — ISOSORBIDE MONONITRATE 30 MG/1
90 TABLET, EXTENDED RELEASE ORAL DAILY
Qty: 90 TABLET | Refills: 1 | OUTPATIENT
Start: 2023-04-25 | End: 2024-04-24

## 2023-04-24 RX ORDER — HYDRALAZINE HYDROCHLORIDE 100 MG/1
100 TABLET, FILM COATED ORAL EVERY 8 HOURS
Qty: 60 TABLET | Refills: 0 | Status: SHIPPED | OUTPATIENT
Start: 2023-04-24 | End: 2023-08-17 | Stop reason: SDUPTHER

## 2023-04-24 RX ORDER — ISOSORBIDE MONONITRATE 30 MG/1
90 TABLET, EXTENDED RELEASE ORAL DAILY
Qty: 90 TABLET | Refills: 1 | Status: ON HOLD | OUTPATIENT
Start: 2023-04-25 | End: 2023-05-17 | Stop reason: HOSPADM

## 2023-04-24 RX ORDER — AMLODIPINE BESYLATE 5 MG/1
5 TABLET ORAL DAILY
Status: ON HOLD | COMMUNITY
End: 2023-04-24 | Stop reason: CLARIF

## 2023-04-24 RX ADMIN — LEVETIRACETAM 500 MG: 500 TABLET, FILM COATED ORAL at 09:04

## 2023-04-24 RX ADMIN — SUCRALFATE 1 G: 1 SUSPENSION ORAL at 11:04

## 2023-04-24 RX ADMIN — HYDRALAZINE HYDROCHLORIDE 10 MG: 20 INJECTION INTRAMUSCULAR; INTRAVENOUS at 04:04

## 2023-04-24 RX ADMIN — SENNOSIDES AND DOCUSATE SODIUM 2 TABLET: 50; 8.6 TABLET ORAL at 09:04

## 2023-04-24 RX ADMIN — APIXABAN 2.5 MG: 2.5 TABLET, FILM COATED ORAL at 09:04

## 2023-04-24 RX ADMIN — SUCRALFATE 1 G: 1 SUSPENSION ORAL at 05:04

## 2023-04-24 RX ADMIN — SUCRALFATE 1 G: 1 SUSPENSION ORAL at 04:04

## 2023-04-24 RX ADMIN — HYDRALAZINE HYDROCHLORIDE 100 MG: 25 TABLET, FILM COATED ORAL at 11:04

## 2023-04-24 RX ADMIN — PANTOPRAZOLE SODIUM 40 MG: 40 INJECTION, POWDER, LYOPHILIZED, FOR SOLUTION INTRAVENOUS at 05:04

## 2023-04-24 RX ADMIN — AMLODIPINE BESYLATE 10 MG: 5 TABLET ORAL at 08:04

## 2023-04-24 RX ADMIN — INSULIN ASPART 4 UNITS: 100 INJECTION, SOLUTION INTRAVENOUS; SUBCUTANEOUS at 08:04

## 2023-04-24 RX ADMIN — MUPIROCIN: 20 OINTMENT TOPICAL at 10:04

## 2023-04-24 RX ADMIN — INSULIN DETEMIR 6 UNITS: 100 INJECTION, SOLUTION SUBCUTANEOUS at 10:04

## 2023-04-24 RX ADMIN — GABAPENTIN 100 MG: 100 CAPSULE ORAL at 09:04

## 2023-04-24 RX ADMIN — INSULIN ASPART 5 UNITS: 100 INJECTION, SOLUTION INTRAVENOUS; SUBCUTANEOUS at 09:04

## 2023-04-24 RX ADMIN — MUPIROCIN: 20 OINTMENT TOPICAL at 08:04

## 2023-04-24 RX ADMIN — INSULIN ASPART 10 UNITS: 100 INJECTION, SOLUTION INTRAVENOUS; SUBCUTANEOUS at 11:04

## 2023-04-24 RX ADMIN — FLUOXETINE 20 MG: 20 CAPSULE ORAL at 08:04

## 2023-04-24 RX ADMIN — CARVEDILOL 25 MG: 12.5 TABLET, FILM COATED ORAL at 08:04

## 2023-04-24 RX ADMIN — INSULIN ASPART 4 UNITS: 100 INJECTION, SOLUTION INTRAVENOUS; SUBCUTANEOUS at 11:04

## 2023-04-24 RX ADMIN — GABAPENTIN 100 MG: 100 CAPSULE ORAL at 08:04

## 2023-04-24 RX ADMIN — LEVETIRACETAM 500 MG: 500 TABLET, FILM COATED ORAL at 08:04

## 2023-04-24 RX ADMIN — APIXABAN 2.5 MG: 2.5 TABLET, FILM COATED ORAL at 08:04

## 2023-04-24 RX ADMIN — ISOSORBIDE MONONITRATE 60 MG: 60 TABLET, EXTENDED RELEASE ORAL at 08:04

## 2023-04-24 RX ADMIN — AMITRIPTYLINE HYDROCHLORIDE 50 MG: 25 TABLET, FILM COATED ORAL at 09:04

## 2023-04-24 RX ADMIN — GABAPENTIN 100 MG: 100 CAPSULE ORAL at 02:04

## 2023-04-24 RX ADMIN — INSULIN ASPART 10 UNITS: 100 INJECTION, SOLUTION INTRAVENOUS; SUBCUTANEOUS at 05:04

## 2023-04-24 NOTE — PROGRESS NOTES
HD tx tolerated well  Net UF 4L  2 units PRBC administered on HD       04/23/23 1900   Handoff Report   Received From Stephanie Mena   Given To Kerry   Vital Signs   Temp 98.3 °F (36.8 °C)   Temp Source Axillary   Pulse 98   Resp 18   BP (!) 181/113   BP Location Left arm   BP Method Automatic   Patient Position Lying   Assessments (Pre/Post)   Blood Liters Processed (BLP) 77   Transport Modality bed   Level of Consciousness (AVPU) alert   Dialyzer Clearance heavily streaked        Hemodialysis Catheter 12/09/22 right subclavian   Placement Date: 12/09/22   Location: right subclavian   Line Necessity Review CRRT/HD   Site Assessment No drainage;No redness;No swelling;No warmth   Line Securement Device Secured with sutures   Dressing Type Central line dressing   Dressing Status Clean;Dry;Intact   Dressing Intervention Integrity maintained   Date on Dressing 04/22/23   Dressing Due to be Changed 04/29/23   Venous Patency/Care flushed w/o difficulty;normal saline locked   Arterial Patency/Care flushed w/o difficulty;normal saline locked   Post-Hemodialysis Assessment   Rinseback Volume (mL) 250 mL   Blood Volume Processed (Liters) 77 L   Dialyzer Clearance Heavily streaked   Duration of Treatment 240 minutes   Additional Fluid Intake (mL) 500 mL   Total UF (mL) 5160 mL   Net Fluid Removal 4000   Patient Response to Treatment Tolerated well   Post-Treatment Weight 50.4 kg (111 lb 1.8 oz)   Treatment Weight Change -4   Post-Hemodialysis Comments Tx complete, all blood reinfused per protocol

## 2023-04-24 NOTE — SUBJECTIVE & OBJECTIVE
Interval History: Patient seen and examined.  ABd pain is chronic.  She is asking for dilaudid. I spoke to her about the numerous GI consults recommending against narcotics.  Her blood counts are low.  I discussed with nephrology.  We will dialyze her and tranfuse 2 u PRBCs.    Review of Systems   HENT:  Negative for trouble swallowing.    Respiratory:  Negative for cough and shortness of breath.    Cardiovascular:  Negative for chest pain.   Gastrointestinal:  Positive for abdominal pain. Negative for nausea and vomiting.   Objective:     Vital Signs (Most Recent):  Temp: 98.4 °F (36.9 °C) (04/23/23 1730)  Pulse: 90 (04/23/23 1845)  Resp: 18 (04/23/23 1615)  BP: (!) 152/96 (04/23/23 1845)  SpO2: 95 % (04/23/23 1515)   Vital Signs (24h Range):  Temp:  [97.9 °F (36.6 °C)-98.7 °F (37.1 °C)] 98.4 °F (36.9 °C)  Pulse:  [84-96] 90  Resp:  [18-24] 18  SpO2:  [92 %-98 %] 95 %  BP: (122-199)/() 152/96     Weight: 54 kg (119 lb)  Body mass index is 21.77 kg/m².    Intake/Output Summary (Last 24 hours) at 4/23/2023 1905  Last data filed at 4/23/2023 1730  Gross per 24 hour   Intake 1158 ml   Output 3800 ml   Net -2642 ml      Physical Exam  Vitals and nursing note reviewed.   Constitutional:       Appearance: Normal appearance.   Cardiovascular:      Rate and Rhythm: Normal rate and regular rhythm.   Pulmonary:      Effort: Pulmonary effort is normal. No respiratory distress.      Breath sounds: Normal breath sounds.   Abdominal:      General: Abdomen is flat. Bowel sounds are normal.      Palpations: Abdomen is soft.      Tenderness: There is abdominal tenderness (involuntary guarding with non-focal tenderness).   Skin:     General: Skin is warm.      Capillary Refill: Capillary refill takes less than 2 seconds.   Neurological:      General: No focal deficit present.      Mental Status: She is alert.   She was seen on two occasions: during conversation she is writhing in bed.  During second visit she is sleeping  peacefully in no acute distress.    Significant Labs: All pertinent labs within the past 24 hours have been reviewed.  CBC:   Recent Labs   Lab 04/22/23  0829 04/23/23  0314 04/23/23  1740   WBC 3.73* 5.18 7.50   HGB 7.5* 6.7* 10.5*   HCT 22.2* 19.9* 30.3*   PLT 75* 57* 83*     CMP:   Recent Labs   Lab 04/22/23  0829 04/23/23 0314   * 133*   K 5.4* 3.7   CL 89* 97   CO2 26 23   * 290*   BUN 51* 30*   CREATININE 5.7* 3.4*   CALCIUM 9.0 9.0   PROT 7.5 7.4   ALBUMIN 3.4* 3.5   BILITOT 2.0* 3.7*   ALKPHOS 403* 355*   AST 53* 50*   ALT 63* 57*   ANIONGAP 15 13       Significant Imaging: I have reviewed all pertinent imaging results/findings within the past 24 hours.

## 2023-04-24 NOTE — ASSESSMENT & PLAN NOTE
Patient's FSGs are uncontrolled due to hyperglycemia on current medication regimen.  Last A1c reviewed-   Lab Results   Component Value Date    HGBA1C 5.8 03/03/2023     Most recent fingerstick glucose reviewed- No results for input(s): POCTGLUCOSE in the last 24 hours.  Current correctional scale  Medium  Increase anti-hyperglycemic dose as follows-   Antihyperglycemics (From admission, onward)    Start     Stop Route Frequency Ordered    04/24/23 0715  insulin aspart U-100 pen 4 Units         -- SubQ 3 times daily with meals 04/23/23 1919    04/22/23 1729  insulin aspart U-100 pen 1-10 Units         -- SubQ Before meals & nightly PRN 04/22/23 1630        Hold Oral hypoglycemics while patient is in the hospital.

## 2023-04-24 NOTE — PLAN OF CARE
Patient not discharging today due fluctuation in blood sugar. Will monitor overnight and reassess in am for DC.      04/24/23 1618   Discharge Reassessment   Assessment Type Discharge Planning Reassessment

## 2023-04-24 NOTE — ASSESSMENT & PLAN NOTE
Chronic, uncontrolled.  Has not been getting EPO 2/2 non-compliance and HTN  -Transfusing 2 units RBC on HD  -Followed up CBC ordered for post-transfusion: appropriate response

## 2023-04-24 NOTE — ASSESSMENT & PLAN NOTE
Chronic problem.  -At some point her eliquis was held for this then resumed: unclear how/why  -Continue the eliquis  -Could check with pharmacy re: dosing (with her kidney function may could reduce the outpatient dosing).

## 2023-04-24 NOTE — ASSESSMENT & PLAN NOTE
Chronic, uncontrolled.  Latest blood pressure and vitals reviewed-   Temp:  [97.9 °F (36.6 °C)-98.7 °F (37.1 °C)]   Pulse:  [84-98]   Resp:  [18-24]   BP: (122-199)/()   SpO2:  [92 %-98 %] .   Home meds for hypertension were reviewed and noted below.   Hypertension Medications             amLODIPine (NORVASC) 5 MG tablet Take 2 tablets (10 mg total) by mouth once daily.    carvediloL (COREG) 25 MG tablet Take 1 tablet twice a day by oral route for 30 days.    hydrALAZINE (APRESOLINE) 100 MG tablet Take 1 tablet twice a day by oral route for 30 days.    isosorbide mononitrate (IMDUR) 60 MG 24 hr tablet Take 1 tablet every day by oral route for 30 days.          While in the hospital, will manage blood pressure as follows; Adjust home antihypertensive regimen as follows- we intended to d/c after HD however blood pressure is too high.  I discussed with nephrologist- we will increase her amlodipine.  She will be dosed with the PM carvedilol which was held during HD.  Monitor overnight and hoping to d/c in AM.    Will utilize p.r.n. blood pressure medication only if patient's blood pressure greater than  180/110 and she develops symptoms such as worsening chest pain or shortness of breath.

## 2023-04-24 NOTE — PLAN OF CARE
Email sent to PCP requesting a hopsital follow up appt.     12:30pm- notified pt of her PCP FU appt on 5/3 @8:45am; information also placed on pt's AVS.

## 2023-04-24 NOTE — NURSING
1605 CBG 59, Emily Pemberton, NP aware and patient received juice with sugar and crackers.  1615 CBG 58, Emily Pemberton NP aware and told to recheck in 15 minutes. Another juice given to patient.  1640

## 2023-04-24 NOTE — SUBJECTIVE & OBJECTIVE
Interval History:  Patient seen and examined.  No acute events overnight.  BP better this morning, but still elevated.  Case was discussed with Nephrology and medications adjusted.  Her glucose this morning was greater than 500.  BMP was obtained and her home Levemir was initiated.    Review of Systems   Constitutional:  Negative for appetite change, chills, diaphoresis and fever.   Respiratory:  Negative for cough, shortness of breath and wheezing.    Cardiovascular:  Negative for chest pain and palpitations.   Gastrointestinal:  Positive for abdominal pain. Negative for nausea and vomiting.   Neurological:  Negative for dizziness, weakness and headaches.   Objective:     Vital Signs (Most Recent):  Temp: 99.2 °F (37.3 °C) (04/24/23 1453)  Pulse: 87 (04/24/23 1453)  Resp: 16 (04/24/23 1453)  BP: 114/83 (04/24/23 1453)  SpO2: 96 % (04/24/23 1453) Vital Signs (24h Range):  Temp:  [98.3 °F (36.8 °C)-100 °F (37.8 °C)] 99.2 °F (37.3 °C)  Pulse:  [80-98] 87  Resp:  [16-18] 16  SpO2:  [96 %] 96 %  BP: (114-191)/() 114/83     Weight: 54 kg (119 lb)  Body mass index is 21.77 kg/m².    Intake/Output Summary (Last 24 hours) at 4/24/2023 1618  Last data filed at 4/24/2023 1610  Gross per 24 hour   Intake 1939 ml   Output 5160 ml   Net -3221 ml      Physical Exam  Vitals and nursing note reviewed.   Constitutional:       Appearance: Normal appearance.   Cardiovascular:      Rate and Rhythm: Normal rate and regular rhythm.   Pulmonary:      Effort: Pulmonary effort is normal. No respiratory distress.      Breath sounds: Normal breath sounds.   Abdominal:      General: Abdomen is flat. Bowel sounds are normal. There is no distension.      Palpations: Abdomen is soft.      Tenderness: There is abdominal tenderness (mild ttp). There is no guarding.   Skin:     General: Skin is warm.      Capillary Refill: Capillary refill takes less than 2 seconds.   Neurological:      General: No focal deficit present.      Mental Status:  She is alert.       Significant Labs: All pertinent labs within the past 24 hours have been reviewed.  CBC:   Recent Labs   Lab 04/23/23  0314 04/23/23  1740 04/24/23  0915   WBC 5.18 7.50 5.61   HGB 6.7* 10.5* 10.3*   HCT 19.9* 30.3* 30.0*   PLT 57* 83* 61*     CMP:   Recent Labs   Lab 04/23/23  0314 04/24/23  0915   * 131*   K 3.7 3.8   CL 97 95   CO2 23 26   * 513*   BUN 30* 23*   CREATININE 3.4* 3.2*   CALCIUM 9.0 8.9   PROT 7.4 7.0   ALBUMIN 3.5 3.1*   BILITOT 3.7* 2.2*   ALKPHOS 355* 338*   AST 50* 37   ALT 57* 44   ANIONGAP 13 10       Significant Imaging: I have reviewed all pertinent imaging results/findings within the past 24 hours.

## 2023-04-24 NOTE — PROGRESS NOTES
Critical access hospital Medicine  Progress Note    Patient Name: Tabby Howard  MRN: [id number]  Patient Class: OP- Observation   Admission Date: 4/22/2023  Length of Stay: 0 days  Attending Physician: Jason Peres MD  Primary Care Provider: AIDEN CONNER NP        Subjective:     Principal Problem:Abdominal pain        HPI:  Ms. Howard is a 33-years-old female who presented to the ED with chief complaint of generalized abdominal pain.  At times difficult to obtain full history from patient.  Seen during dialysis.  She reports she developed generalized abdominal pain today, severe, persistent, as per chart review she was seen 2 days consecutively at [facility] due to generalized abdominal pain, she states she went on those days due to chest pain with shortness of breath rather than abdominal pain.  Reportedly had normal bowel movement yesterday.  Known history of ESRD on HD TTS, states she did have last dialysis on Thursday.  Unclear of her medication, states her mother assist with same.  Of note patient has history of chronic abdominal pain, has had previous evaluation and multiple admissions for same, suspected gastroparesis but has not had emptying study, last admission was at [facility] with recommendation for multimodal approach, gabapentin and amitriptyline were advised, however as per chart review patient perseverating on IV Dilaudid.  At outside hospital, CT abdomen and pelvis with concern for constipation, no other acute findings, KUB nonobstructive bowel gas pattern.  She was given Haldol and Compazine but was further requesting pain medication and ultimately left [facility] and presented to [facility] ED rather than outpatient dialysis.  Blood pressure elevated 187/112, on room air, afebrile.  Labs with hemoglobin 7.5, sodium 130, potassium 5.4, BUN/creatinine 51/5.7, alkaline phosphatase 403, lipase 42, glucose down to 383.  Previous echocardiogram with EF of 55% with grade 3 diastolic dysfunction  with moderate to severe tricuspid regurgitation with PASP 56 with pericardial effusion without tamponade.  ED provider administered Ativan 0.4 mg.  Case discussed with ED provider who requested admission, states discussed with Nephrology and planning  dialysis today.  Discussed with dialysis nurse.      Overview/Hospital Course:  Pt was monitored closely during her stay.  She had HTN which improved with resumption of medication.  She was dialyzed with resolution of her hyperkalemia.  Her H&H dropped.  I discussed with Nephrology.  She is not been getting her Epogen due to HTN and noncompliance.  She was transfused 2 units of packed red blood cells on HD.  She had no evidence of bleeding.  She complained of abdominal pain which is chronic.  She is undergone multiple CTs and was evaluated by GI just last week who recommended avoidance of narcotics.  An echo was done to re-evaluate her pericardial effusion which showed decrease in size with no evidence of tamponade.  Her H&H stabilized.  She continued to be hypertensive.  Her medications were adjusted with good resolve and normalization of BP.  She was also noted to be hyperglycemic.  Her home Levemir was restarted and she was treated with sliding scale insulin.  An ambulatory referral was placed to gastroenterology for chronic abdominal pain with no inpatient acute needs for GI evaluation identified at this time.           Interval History:  Patient seen and examined.  No acute events overnight.  BP better this morning, but still elevated.  Case was discussed with Nephrology and medications adjusted.  Her glucose this morning was greater than 500.  BMP was obtained and her home Levemir was initiated.    Review of Systems   Constitutional:  Negative for appetite change, chills, diaphoresis and fever.   Respiratory:  Negative for cough, shortness of breath and wheezing.    Cardiovascular:  Negative for chest pain and palpitations.   Gastrointestinal:  Positive for  abdominal pain. Negative for nausea and vomiting.   Neurological:  Negative for dizziness, weakness and headaches.   Objective:     Vital Signs (Most Recent):  Temp: 99.2 °F (37.3 °C) (04/24/23 1453)  Pulse: 87 (04/24/23 1453)  Resp: 16 (04/24/23 1453)  BP: 114/83 (04/24/23 1453)  SpO2: 96 % (04/24/23 1453) Vital Signs (24h Range):  Temp:  [98.3 °F (36.8 °C)-100 °F (37.8 °C)] 99.2 °F (37.3 °C)  Pulse:  [80-98] 87  Resp:  [16-18] 16  SpO2:  [96 %] 96 %  BP: (114-191)/() 114/83     Weight: 54 kg (119 lb)  Body mass index is 21.77 kg/m².    Intake/Output Summary (Last 24 hours) at 4/24/2023 1618  Last data filed at 4/24/2023 1610  Gross per 24 hour   Intake 1939 ml   Output 5160 ml   Net -3221 ml      Physical Exam  Vitals and nursing note reviewed.   Constitutional:       Appearance: Normal appearance.   Cardiovascular:      Rate and Rhythm: Normal rate and regular rhythm.   Pulmonary:      Effort: Pulmonary effort is normal. No respiratory distress.      Breath sounds: Normal breath sounds.   Abdominal:      General: Abdomen is flat. Bowel sounds are normal. There is no distension.      Palpations: Abdomen is soft.      Tenderness: There is abdominal tenderness (mild ttp). There is no guarding.   Skin:     General: Skin is warm.      Capillary Refill: Capillary refill takes less than 2 seconds.   Neurological:      General: No focal deficit present.      Mental Status: She is alert.       Significant Labs: All pertinent labs within the past 24 hours have been reviewed.  CBC:   Recent Labs   Lab 04/23/23  0314 04/23/23  1740 04/24/23  0915   WBC 5.18 7.50 5.61   HGB 6.7* 10.5* 10.3*   HCT 19.9* 30.3* 30.0*   PLT 57* 83* 61*     CMP:   Recent Labs   Lab 04/23/23  0314 04/24/23  0915   * 131*   K 3.7 3.8   CL 97 95   CO2 23 26   * 513*   BUN 30* 23*   CREATININE 3.4* 3.2*   CALCIUM 9.0 8.9   PROT 7.4 7.0   ALBUMIN 3.5 3.1*   BILITOT 3.7* 2.2*   ALKPHOS 355* 338*   AST 50* 37   ALT 57* 44   ANIONGAP 13  10       Significant Imaging: I have reviewed all pertinent imaging results/findings within the past 24 hours.      Assessment/Plan:      * Abdominal pain  This is a chronic issue for which she has seen GI inpatient (just 1 week ago) and had numerous imaging studies.  There has been talk of gastroparesis.  She has not followed up outpatient.  -No narcotics (in keeping with prior GI recs)  -Continue the amitriptyline, gabapentin, carafate.  -No indication for inpatient GI workup.  I have placed amb referral to GI outpatient.    Hyperkalemia  Resolved after HD  -Follow BMP as ordered.  -tele monitoring  -renal diet      Hyponatremia        Mood disorder  Chronic issue,  Continue the fluoxetine.      Chronic deep vein thrombosis (DVT) of left upper extremity  CHronic,  Continue the eliquis      Anemia in ESRD (end-stage renal disease)  Chronic, uncontrolled.  Has not been getting EPO 2/2 non-compliance and HTN  -Transfusing 2 units RBC on HD  -Followed up CBC ordered for post-transfusion: appropriate response      Frequent hospital admissions  Multiple presentations for same complaints with some non-compliance issues requiring acute inpatient HD.      Type 2 diabetes mellitus with hyperglycemia, with long-term current use of insulin, previous HbA1c 5.8%  Patient's FSGs are uncontrolled due to hyperglycemia on current medication regimen.  Last A1c reviewed-   Lab Results   Component Value Date    HGBA1C 5.8 03/03/2023     Most recent fingerstick glucose reviewed- No results for input(s): POCTGLUCOSE in the last 24 hours.  Current correctional scale  Medium  Increase anti-hyperglycemic dose as follows-   Antihyperglycemics (From admission, onward)    Start     Stop Route Frequency Ordered    04/25/23 0900  insulin detemir U-100 (Levemir) pen 6 Units         -- SubQ Daily 04/24/23 1607    04/24/23 0715  insulin aspart U-100 pen 4 Units         -- SubQ 3 times daily with meals 04/23/23 1919    04/22/23 1729  insulin aspart  U-100 pen 1-10 Units         -- SubQ Before meals & nightly PRN 04/22/23 1630        Hold Oral hypoglycemics while patient is in the hospital.    Brittle with hyperglycemic and hypoglycemic episode today - hold night time levemir and monitor closely.     Thrombocytopenia  Chronic problem.  -At some point her eliquis was held for this then resumed: unclear how/why  -Continue the eliquis  -Could check with pharmacy re: dosing (with her kidney function may could reduce the outpatient dosing).      Uncontrolled hypertension  Chronic, uncontrolled.  Latest blood pressure and vitals reviewed-   Temp:  [97.9 °F (36.6 °C)-98.7 °F (37.1 °C)]   Pulse:  [84-98]   Resp:  [18-24]   BP: (122-199)/()   SpO2:  [92 %-98 %] .   Home meds for hypertension were reviewed and noted below.   Hypertension Medications             amLODIPine (NORVASC) 5 MG tablet Take 2 tablets (10 mg total) by mouth once daily.    carvediloL (COREG) 25 MG tablet Take 1 tablet twice a day by oral route for 30 days.    hydrALAZINE (APRESOLINE) 100 MG tablet Take 1 tablet twice a day by oral route for 30 days.    isosorbide mononitrate (IMDUR) 60 MG 24 hr tablet Take 1 tablet every day by oral route for 30 days.          While in the hospital, will manage blood pressure as follows; Adjust home antihypertensive regimen as follows- we intended to d/c after HD however blood pressure is too high.  I discussed with nephrologist- we will increase her amlodipine.  She will be dosed with the PM carvedilol which was held during HD.  Monitor overnight and hoping to d/c in AM.    Will utilize p.r.n. blood pressure medication only if patient's blood pressure greater than  180/110 and she develops symptoms such as worsening chest pain or shortness of breath.      ESRD (end stage renal disease) on dialysis  HD on admit and today so she could get blood/manage HTN  -Nephrology following      Pericardial effusion  ECHO was repeated  -I discussed with echo tech and  reviewed results: actually decreased in size when compared to prior without tamponade.      Seizure disorder  Chronic, stable.  -Continue the keppra.  -Seizure precautions      Gastroparesis - suspected, unconfirmed  Antiemetics PRN  -Outpatient GI referral placed      VTE Risk Mitigation (From admission, onward)         Ordered     apixaban tablet 2.5 mg  2 times daily         04/22/23 1641                Discharge Planning   TATIANA: 4/24/2023     Code Status: Full Code   Is the patient medically ready for discharge?:     Reason for patient still in hospital (select all that apply): Patient trending condition and Laboratory test  Discharge Plan A: Home with family   Discharge Delays: None known at this time      Update: 420pm - BG 59. Holding discharge and monitor overnight.         Emily Bennett NP  Department of Hospital Medicine   [facility]

## 2023-04-24 NOTE — ASSESSMENT & PLAN NOTE
Multiple presentations for same complaints with some non-compliance issues requiring acute inpatient HD.

## 2023-04-24 NOTE — ASSESSMENT & PLAN NOTE
Patient's FSGs are uncontrolled due to hyperglycemia on current medication regimen.  Last A1c reviewed-   Lab Results   Component Value Date    HGBA1C 5.8 03/03/2023     Most recent fingerstick glucose reviewed- No results for input(s): POCTGLUCOSE in the last 24 hours.  Current correctional scale  Medium  Increase anti-hyperglycemic dose as follows-   Antihyperglycemics (From admission, onward)    Start     Stop Route Frequency Ordered    04/25/23 0900  insulin detemir U-100 (Levemir) pen 6 Units         -- SubQ Daily 04/24/23 1607    04/24/23 0715  insulin aspart U-100 pen 4 Units         -- SubQ 3 times daily with meals 04/23/23 1919    04/22/23 1729  insulin aspart U-100 pen 1-10 Units         -- SubQ Before meals & nightly PRN 04/22/23 1630        Hold Oral hypoglycemics while patient is in the hospital.    Brittle with hyperglycemic and hypoglycemic episode today - hold night time levemir and monitor closely.

## 2023-04-24 NOTE — PLAN OF CARE
Patient BP has improved today. Had a low CBG, MD aware.       Problem: Adult Inpatient Plan of Care  Goal: Plan of Care Review  Outcome: Ongoing, Progressing  Flowsheets (Taken 4/24/2023 1838)  Plan of Care Reviewed With: patient  Goal: Optimal Comfort and Wellbeing  Outcome: Ongoing, Progressing  Intervention: Provide Person-Centered Care  Flowsheets (Taken 4/24/2023 1838)  Trust Relationship/Rapport:   care explained   choices provided   thoughts/feelings acknowledged   reassurance provided     Problem: Diabetes Comorbidity  Goal: Blood Glucose Level Within Targeted Range  Outcome: Ongoing, Progressing  Intervention: Monitor and Manage Glycemia  Flowsheets (Taken 4/24/2023 1838)  Glycemic Management: blood glucose monitored

## 2023-04-24 NOTE — PROGRESS NOTES
[facility] Medicine  Progress Note    Patient Name: Tabby Howard  MRN: [id number]  Patient Class: OP- Observation   Admission Date: 4/22/2023  Length of Stay: 0 days  Attending Physician: Jason Peres MD  Primary Care Provider: AIDEN CONNER NP        Subjective:     Principal Problem:Abdominal pain        HPI:  Ms. Howard is a 33-years-old female who presented to the ED with chief complaint of generalized abdominal pain.  At times difficult to obtain full history from patient.  Seen during dialysis.  She reports she developed generalized abdominal pain today, severe, persistent, as per chart review she was seen 2 days consecutively at [place] ED due to generalized abdominal pain, she states she went on those days due to chest pain with shortness of breath rather than abdominal pain.  Reportedly had normal bowel movement yesterday.  Known history of ESRD on HD TTS, states she did have last dialysis on Thursday.  Unclear of her medication, states her mother assist with same.  Of note patient has history of chronic abdominal pain, has had previous evaluation and multiple admissions for same, suspected gastroparesis but has not had emptying study, last admission was at [facility] with recommendation for multimodal approach, gabapentin and amitriptyline were advised, however as per chart review patient perseverating on IV Dilaudid.  At outside hospital, CT abdomen and pelvis with concern for constipation, no other acute findings, KUB nonobstructive bowel gas pattern.  She was given Haldol and Compazine but was further requesting pain medication and ultimately left [place] and presented to [facility] ED rather than outpatient dialysis.  Blood pressure elevated 187/112, on room air, afebrile.  Labs with hemoglobin 7.5, sodium 130, potassium 5.4, BUN/creatinine 51/5.7, alkaline phosphatase 403, lipase 42, glucose down to 383.  Previous echocardiogram with EF of 55% with grade 3 diastolic dysfunction  with moderate to severe tricuspid regurgitation with PASP 56 with pericardial effusion without tamponade.  ED provider administered Ativan 0.4 mg.  Case discussed with ED provider who requested admission, states discussed with Nephrology and planning  dialysis today.  Discussed with dialysis nurse.      Overview/Hospital Course:    Interval History: Patient seen and examined.  ABd pain is chronic.  She is asking for dilaudid. I spoke to her about the numerous GI consults recommending against narcotics.  Her blood counts are low.  I discussed with nephrology.  We will dialyze her and tranfuse 2 u PRBCs.    Review of Systems   HENT:  Negative for trouble swallowing.    Respiratory:  Negative for cough and shortness of breath.    Cardiovascular:  Negative for chest pain.   Gastrointestinal:  Positive for abdominal pain. Negative for nausea and vomiting.   Objective:     Vital Signs (Most Recent):  Temp: 98.4 °F (36.9 °C) (04/23/23 1730)  Pulse: 90 (04/23/23 1845)  Resp: 18 (04/23/23 1615)  BP: (!) 152/96 (04/23/23 1845)  SpO2: 95 % (04/23/23 1515)   Vital Signs (24h Range):  Temp:  [97.9 °F (36.6 °C)-98.7 °F (37.1 °C)] 98.4 °F (36.9 °C)  Pulse:  [84-96] 90  Resp:  [18-24] 18  SpO2:  [92 %-98 %] 95 %  BP: (122-199)/() 152/96     Weight: 54 kg (119 lb)  Body mass index is 21.77 kg/m².    Intake/Output Summary (Last 24 hours) at 4/23/2023 1905  Last data filed at 4/23/2023 1730  Gross per 24 hour   Intake 1158 ml   Output 3800 ml   Net -2642 ml      Physical Exam  Vitals and nursing note reviewed.   Constitutional:       Appearance: Normal appearance.   Cardiovascular:      Rate and Rhythm: Normal rate and regular rhythm.   Pulmonary:      Effort: Pulmonary effort is normal. No respiratory distress.      Breath sounds: Normal breath sounds.   Abdominal:      General: Abdomen is flat. Bowel sounds are normal.      Palpations: Abdomen is soft.      Tenderness: There is abdominal tenderness (involuntary guarding with  non-focal tenderness).   Skin:     General: Skin is warm.      Capillary Refill: Capillary refill takes less than 2 seconds.   Neurological:      General: No focal deficit present.      Mental Status: She is alert.   She was seen on two occasions: during conversation she is writhing in bed.  During second visit she is sleeping peacefully in no acute distress.    Significant Labs: All pertinent labs within the past 24 hours have been reviewed.  CBC:   Recent Labs   Lab 04/22/23  0829 04/23/23 0314 04/23/23  1740   WBC 3.73* 5.18 7.50   HGB 7.5* 6.7* 10.5*   HCT 22.2* 19.9* 30.3*   PLT 75* 57* 83*     CMP:   Recent Labs   Lab 04/22/23  0829 04/23/23 0314   * 133*   K 5.4* 3.7   CL 89* 97   CO2 26 23   * 290*   BUN 51* 30*   CREATININE 5.7* 3.4*   CALCIUM 9.0 9.0   PROT 7.5 7.4   ALBUMIN 3.4* 3.5   BILITOT 2.0* 3.7*   ALKPHOS 403* 355*   AST 53* 50*   ALT 63* 57*   ANIONGAP 15 13       Significant Imaging: I have reviewed all pertinent imaging results/findings within the past 24 hours.      Assessment/Plan:      * Abdominal pain  This is a chronic issue for which she has seen GI inpatient (just 1 week ago) and had numerous imaging studies.  There has been talk of gastroparesis.  She has not followed up outpatient.  -No narcotics (in keeping with prior GI recs)  -Continue the amitriptyline, gabapentin, carafate.  -No indication for inpatient GI workup.  I have placed amb referral to GI outpatient.    Hyperkalemia  Resolved after HD  -Follow BMP as ordered.  -tele monitoring  -renal diet      Hyponatremia        Mood disorder  Chronic issue,  Continue the fluoxetine.      Chronic deep vein thrombosis (DVT) of left upper extremity  CHronic,  Continue the eliquis      Anemia in ESRD (end-stage renal disease)  Chronic, uncontrolled.  Has not been getting EPO 2/2 non-compliance and HTN  -Transfusing 2 units RBC on HD  -Followed up CBC ordered for post-transfusion: appropriate response      Frequent hospital  admissions  Multiple presentations for same complaints with some non-compliance issues requiring acute inpatient HD.      Type 2 diabetes mellitus with hyperglycemia, with long-term current use of insulin, previous HbA1c 5.8%  Patient's FSGs are uncontrolled due to hyperglycemia on current medication regimen.  Last A1c reviewed-   Lab Results   Component Value Date    HGBA1C 5.8 03/03/2023     Most recent fingerstick glucose reviewed- No results for input(s): POCTGLUCOSE in the last 24 hours.  Current correctional scale  Medium  Increase anti-hyperglycemic dose as follows-   Antihyperglycemics (From admission, onward)      Start     Stop Route Frequency Ordered    04/24/23 0715  insulin aspart U-100 pen 4 Units         -- SubQ 3 times daily with meals 04/23/23 1919    04/22/23 1729  insulin aspart U-100 pen 1-10 Units         -- SubQ Before meals & nightly PRN 04/22/23 1630          Hold Oral hypoglycemics while patient is in the hospital.    Thrombocytopenia  Chronic problem.  -At some point her eliquis was held for this then resumed: unclear how/why  -Continue the eliquis  -Could check with pharmacy re: dosing (with her kidney function may could reduce the outpatient dosing).      Uncontrolled hypertension  Chronic, uncontrolled.  Latest blood pressure and vitals reviewed-   Temp:  [97.9 °F (36.6 °C)-98.7 °F (37.1 °C)]   Pulse:  [84-98]   Resp:  [18-24]   BP: (122-199)/()   SpO2:  [92 %-98 %] .   Home meds for hypertension were reviewed and noted below.   Hypertension Medications               amLODIPine (NORVASC) 5 MG tablet Take 2 tablets (10 mg total) by mouth once daily.    carvediloL (COREG) 25 MG tablet Take 1 tablet twice a day by oral route for 30 days.    hydrALAZINE (APRESOLINE) 100 MG tablet Take 1 tablet twice a day by oral route for 30 days.    isosorbide mononitrate (IMDUR) 60 MG 24 hr tablet Take 1 tablet every day by oral route for 30 days.            While in the hospital, will manage blood  pressure as follows; Adjust home antihypertensive regimen as follows- we intended to d/c after HD however blood pressure is too high.  I discussed with nephrologist- we will increase her amlodipine.  She will be dosed with the PM carvedilol which was held during HD.  Monitor overnight and hoping to d/c in AM.    Will utilize p.r.n. blood pressure medication only if patient's blood pressure greater than  180/110 and she develops symptoms such as worsening chest pain or shortness of breath.      ESRD (end stage renal disease) on dialysis  HD on admit and today so she could get blood/manage HTN  -Nephrology following      Pericardial effusion  ECHO was repeated  -I discussed with echo tech and reviewed results: actually decreased in size when compared to prior without tamponade.      Seizure disorder  Chronic, stable.  -Continue the keppra.  -Seizure precautions      Gastroparesis - suspected, unconfirmed  Antiemetics PRN  -Outpatient GI referral placed      VTE Risk Mitigation (From admission, onward)           Ordered     apixaban tablet 2.5 mg  2 times daily         04/22/23 1641                    Discharge Planning   TATIANA: 4/23/2023     Code Status: Full Code   Is the patient medically ready for discharge?:     Reason for patient still in hospital (select all that apply): Patient unstable  Discharge Plan A: Home with family   Discharge Delays: None known at this time              Linette Yepez NP  Department of Hospital Medicine   [facility]

## 2023-04-24 NOTE — ASSESSMENT & PLAN NOTE
ECHO was repeated  -I discussed with echo tech and reviewed results: actually decreased in size when compared to prior without tamponade.

## 2023-04-24 NOTE — CONSULTS
INPATIENT NEPHROLOGY CONSULT   [facility]    Patient Name: Tabby Howard  Date: 04/24/2023    Reason for consultation: ESRD    Chief Complaint:   Chief Complaint   Patient presents with    Abdominal Pain    Back Pain     X today. Pt missed dialysis today.        History of Present Illness:  Ms. Howard is a 33-years-old female who presented to the ED with chief complaint of generalized abdominal pain. She reports she developed generalized abdominal pain today, severe, persistent, as per chart review she was seen 2 days consecutively at [place] ED due to generalized abdominal pain, she states she went on those days due to chest pain with shortness of breath rather than abdominal pain.  Reportedly had normal bowel movement yesterday.  Known history of ESRD on HD TTS, states she did have last dialysis on Thursday.  Unclear of her medication, states her mother assist with same.  Of note patient has history of chronic abdominal pain, has had previous evaluation and multiple admissions for same, suspected gastroparesis but has not had emptying study, last admission was at [facility] with recommendation for multimodal approach, gabapentin and amitriptyline were advised, however as per chart review patient perseverating on IV Dilaudid.  At outside hospital, CT abdomen and pelvis with concern for constipation, no other acute findings, KUB nonobstructive bowel gas pattern. She was given Haldol and Compazine but was further requesting pain medication and ultimately left [place] and presented to [facility] ED rather than outpatient dialysis.  Blood pressure elevated 187/112, on room air, afebrile.  Labs with hemoglobin 7.5, sodium 130, potassium 5.4, BUN/creatinine 51/5.7, alkaline phosphatase 403, lipase 42, glucose down to 383. Previous echocardiogram with EF of 55% with grade 3 diastolic dysfunction with moderate to severe tricuspid regurgitation with PASP 56 with pericardial effusion without tamponade.  ED provider  administered Ativan 0.4 mg. Consulted for dialysis.    Interval History:  - ordered HD/UF- got off 3L, had to give IV hyral during treatment due to high BP  - got IV BP meds overnight, Hb 6.7- will do HD/UF today with 2u of blood        Plan of Care:    Assessment:  ESRD on HD TTS  HTN urgency  Hyponatremia  Hyperkalemia  SHPT  Anemia of CKD    Plan:    - ordered HD TTS, supplemental HD/UF treatment today due to high BP and need for blood transfusion  - resume home BP meds- UF 3-4L- will adjust BP meds thereafter  - renal diet, 1.5L fluid restriction  - adjust dialysate  - phos at goal  - kenyatta when bp better controlled.  Hydralazine to tid    Thank you for allowing us to participate in this patient's care. We will continue to follow.    Vital Signs:  Temp Readings from Last 3 Encounters:   23 100 °F (37.8 °C) (Oral)   23 98.2 °F (36.8 °C)   23 98.2 °F (36.8 °C) (Oral)       Pulse Readings from Last 3 Encounters:   23 83   23 96   23 97       BP Readings from Last 3 Encounters:   23 (!) 164/96   23 (!) 182/102   23 (!) 184/120       Weight:  Wt Readings from Last 3 Encounters:   23 54 kg (119 lb)   23 54 kg (119 lb 0.8 oz)   23 54 kg (119 lb)       Past Medical & Surgical History:  Past Medical History:   Diagnosis Date    ESRD on hemodialysis 2022    Gastritis 2022    EGD was 21    Gastroparesis 2022    has not had Emptying study    Heart failure with preserved ejection fraction 2022    EF 55% on 3/22    History of supraventricular tachycardia     Hyperkalemia 2022    Hypertensive emergency 2022    Sickle cell trait 2022    Type 2 diabetes mellitus        Past Surgical History:   Procedure Laterality Date     SECTION      x 3    COLONOSCOPY      COLONOSCOPY N/A 2022    Procedure: COLONOSCOPY;  Surgeon: Jagdeep Cedeno MD;  Location: [facility];  Service: Endoscopy;  Laterality:  N/A;    ESOPHAGOGASTRODUODENOSCOPY N/A 10/18/2019    Procedure: ESOPHAGOGASTRODUODENOSCOPY (EGD);  Surgeon: Gianluca Mendez MD;  Location: [facility] ENDO;  Service: Endoscopy;  Laterality: N/A;    ESOPHAGOGASTRODUODENOSCOPY N/A 08/24/2022    Procedure: EGD (ESOPHAGOGASTRODUODENOSCOPY);  Surgeon: Micky Paredes III, MD;  Location: [facility] ENDO;  Service: Endoscopy;  Laterality: N/A;    ESOPHAGOGASTRODUODENOSCOPY N/A 12/5/2022    Procedure: EGD (ESOPHAGOGASTRODUODENOSCOPY);  Surgeon: Marcelo Zhong MD;  Location: [facility] ENDO;  Service: Endoscopy;  Laterality: N/A;    LAPAROSCOPIC CHOLECYSTECTOMY N/A 07/30/2022    Procedure: CHOLECYSTECTOMY, LAPAROSCOPIC;  Surgeon: Grey Perez MD;  Location: [facility] OR;  Service: General;  Laterality: N/A;    PLACEMENT OF DUAL-LUMEN VASCULAR CATHETER Left 07/12/2022    Procedure: INSERTION-CATHETER-JOSEPH;  Surgeon: Dionte Gan MD;  Location: [facility] OR;  Service: General;  Laterality: Left;    PLACEMENT OF DUAL-LUMEN VASCULAR CATHETER Right 07/26/2022    Procedure: INSERTION-CATHETER-Hemosplit;  Surgeon: Dionte Gan MD;  Location: [facility] OR;  Service: General;  Laterality: Right;       Past Social History:  Social History     Socioeconomic History    Marital status:    Tobacco Use    Smoking status: Never    Smokeless tobacco: Never   Substance and Sexual Activity    Alcohol use: Not Currently    Drug use: No    Sexual activity: Not Currently     Partners: Male     Birth control/protection: I.U.D.     Social Determinants of Health     Financial Resource Strain: Unknown    Difficulty of Paying Living Expenses: Patient refused   Food Insecurity: Unknown    Worried About Running Out of Food in the Last Year: Patient refused    Ran Out of Food in the Last Year: Patient refused   Transportation Needs: Unknown    Lack of Transportation (Medical): Patient refused    Lack of Transportation (Non-Medical): Patient refused   Physical Activity: Unknown    Days of Exercise per Week:  Patient refused    Minutes of Exercise per Session: Patient refused   Stress: Unknown    Feeling of Stress : Patient refused   Social Connections: Unknown    Frequency of Communication with Friends and Family: Patient refused    Frequency of Social Gatherings with Friends and Family: Patient refused    Attends Cheondoism Services: Patient refused    Active Member of Clubs or Organizations: Patient refused    Attends Club or Organization Meetings: Patient refused    Marital Status: Patient refused   Housing Stability: Unknown    Unable to Pay for Housing in the Last Year: Patient refused    Unstable Housing in the Last Year: Patient refused       Medications:  Scheduled Meds:   amitriptyline  50 mg Oral QHS    amLODIPine  10 mg Oral Daily    apixaban  2.5 mg Oral BID    carvediloL  25 mg Oral BID WM    epoetin teresa-epbx  10,000 Units Intravenous Every Tues, Thurs, Sat    FLUoxetine  20 mg Oral Daily    gabapentin  100 mg Oral TID    hydrALAZINE  100 mg Oral BID    insulin aspart U-100  4 Units Subcutaneous TIDWM    isosorbide mononitrate  60 mg Oral Daily    levETIRAcetam  500 mg Oral BID    mupirocin   Nasal BID    pantoprazole  40 mg Intravenous Daily    senna-docusate 8.6-50 mg  2 tablet Oral BID    sucralfate  1 g Oral QID (AC & HS)     Continuous Infusions:  PRN Meds:.sodium chloride, acetaminophen, cloNIDine, dextrose 50%, dextrose 50%, dicyclomine, glucagon (human recombinant), glucose, glucose, hydrALAZINE, insulin aspart U-100, melatonin, naloxone, ondansetron, prochlorperazine, sodium chloride 0.9%, traZODone  No current facility-administered medications on file prior to encounter.     Current Outpatient Medications on File Prior to Encounter   Medication Sig Dispense Refill    albuterol (PROVENTIL/VENTOLIN HFA) 90 mcg/actuation inhaler Inhale 2 puffs into the lungs every 6 (six) hours as needed for Wheezing. Rescue 18 g 3    amitriptyline (ELAVIL) 50 MG tablet Take 1 tablet (50 mg total) by mouth every  "evening. 30 tablet 6    apixaban (ELIQUIS) 5 mg Tab Take 1 tablet (5 mg total) by mouth 2 (two) times daily. 60 tablet 2    carvediloL (COREG) 25 MG tablet Take 1 tablet twice a day by oral route for 30 days. 60 tablet 2    dicyclomine (BENTYL) 10 MG capsule Take 1 capsule (10 mg total) by mouth 4 (four) times daily as needed (abdominal pain). 20 capsule 0    FLUoxetine 20 MG capsule Take 1 capsule every day by oral route for 30 days. 30 capsule 2    gabapentin (NEURONTIN) 100 MG capsule Take 1 capsule by mouth 3 times daily 90 capsule 2    hydrALAZINE (APRESOLINE) 100 MG tablet Take 1 tablet twice a day by oral route for 30 days. 60 tablet 2    insulin aspart U-100 (NOVOLOG) 100 unit/mL (3 mL) InPn pen Inject 4 Units into the skin 3 (three) times daily with meals. 15 mL 3    insulin detemir U-100, Levemir, 100 unit/mL (3 mL) SubQ InPn pen Inject 6 Units into the skin 2 (two) times daily. 15 mL 3    isosorbide mononitrate (IMDUR) 60 MG 24 hr tablet Take 1 tablet every day by oral route for 30 days. 30 tablet 2    levETIRAcetam (KEPPRA) 500 MG Tab Take 1 tablet twice a day by oral route for 30 days. 60 tablet 2    ondansetron (ZOFRAN-ODT) 4 MG TbDL Take 1 tablet (4 mg total) by mouth every 6 (six) hours as needed. 20 tablet 0    pantoprazole (PROTONIX) 40 MG tablet Take 1 tablet every day by oral route for 30 days. 30 tablet 2    pen needle, diabetic 31 gauge x 3/16" Ndle Use as directed to inject insulin 5 times daily 100 each 11    sucralfate (CARAFATE) 100 mg/mL suspension Take 10 mLs (1 g total) by mouth 4 (four) times daily before meals and nightly. 1200 mL 0    [DISCONTINUED] atenoloL (TENORMIN) 50 MG tablet Take 1 tablet (50 mg total) by mouth every other day. 45 tablet 3    [DISCONTINUED] omeprazole (PRILOSEC) 20 MG capsule Take 2 capsules (40 mg total) by mouth once daily. for 10 days 20 capsule 0       Allergies:  Penicillins    Past Family History:  Reviewed; refer to Hospitalist Admission Note    Review " of Systems:  Review of Systems - All 14 systems reviewed and negative, except as noted in HPI    Physical Exam:  General Appearance:    NAD, AAO x 3, cooperative, appears stated age   Head:    Normocephalic, atraumatic   Eyes:    PER, EOMI, and conjunctiva/sclera clear bilaterally       Mouth:   Moist mucus membranes, no thrush or oral lesions,       normal dentition   Back:     No CVA tenderness   Lungs:     Clear to auscultation bilaterally, no wheezes, crackles,           rales or rhonchi, symmetric air movement, respirations unlabored   Chest wall:    No tenderness or deformity   Heart:    Regular rate and rhythm, S1 and S2 normal, no murmur, rub   or gallop   Abdomen:     Soft, non-tender, non-distended, bowel sounds active all four   quadrants, no RT or guarding, no masses, no organomegaly   Extremities:   Warm and well perfused, distal pulses are intact, no             cyanosis or peripheral edema   MSK:   No joint or muscle swelling, tenderness or deformity   Skin:   Skin color, texture, turgor normal, no rashes or lesions   Neurologic/Psychiatric:   CNII-XII intact, normal strength and sensation       throughout, no asterixis; normal affect, memory, judgement     and insight      Results:  Lab Results   Component Value Date     (L) 04/23/2023    K 3.7 04/23/2023    CL 97 04/23/2023    CO2 23 04/23/2023    BUN 30 (H) 04/23/2023    CREATININE 3.4 (H) 04/23/2023    CALCIUM 9.0 04/23/2023    ANIONGAP 13 04/23/2023    ESTGFRAFRICA 20 (A) 07/31/2022    EGFRNONAA 18 (A) 07/31/2022       Lab Results   Component Value Date    CALCIUM 9.0 04/23/2023    PHOS 5.4 (H) 04/22/2023       Recent Labs   Lab 04/23/23  1740   WBC 7.50   RBC 3.48*   HGB 10.5*   HCT 30.3*   PLT 83*   MCV 87   MCH 30.2   MCHC 34.7         I have personally reviewed pertinent radiological imaging and reports.    Patient care was time spent personally by me on the following activities:   Obtaining a history  Examination of patient.  Providing  medical care at the patients bedside.  Developing a treatment plan with patient or surrogate and bedside caregivers  Ordering and reviewing laboratory studies, radiographic studies, pulse oximetry.  Ordering and performing treatments and interventions.  Evaluation of patient's response to treatment.  Discussions with consultants while on the unit and immediately available to the patient.  Re-evaluation of the patient's condition.  Documentation in the medical record.     Hugh Figueroa MD  Nephrology  [facility]  [phone]

## 2023-04-24 NOTE — ASSESSMENT & PLAN NOTE
This is a chronic issue for which she has seen GI inpatient (just 1 week ago) and had numerous imaging studies.  There has been talk of gastroparesis.  She has not followed up outpatient.  -No narcotics (in keeping with prior GI recs)  -Continue the amitriptyline, gabapentin, carafate.  -No indication for inpatient GI workup.  I have placed amb referral to GI outpatient.

## 2023-04-25 VITALS
BODY MASS INDEX: 21.9 KG/M2 | RESPIRATION RATE: 16 BRPM | TEMPERATURE: 99 F | HEART RATE: 90 BPM | WEIGHT: 119 LBS | OXYGEN SATURATION: 97 % | DIASTOLIC BLOOD PRESSURE: 93 MMHG | HEIGHT: 62 IN | SYSTOLIC BLOOD PRESSURE: 134 MMHG

## 2023-04-25 LAB
GLUCOSE SERPL-MCNC: 169 MG/DL (ref 70–110)
GLUCOSE SERPL-MCNC: 310 MG/DL (ref 70–110)
GLUCOSE SERPL-MCNC: 335 MG/DL (ref 70–110)
GLUCOSE SERPL-MCNC: 366 MG/DL (ref 70–110)

## 2023-04-25 PROCEDURE — 25000003 PHARM REV CODE 250: Performed by: NURSE PRACTITIONER

## 2023-04-25 PROCEDURE — 25000003 PHARM REV CODE 250: Performed by: INTERNAL MEDICINE

## 2023-04-25 PROCEDURE — 96372 THER/PROPH/DIAG INJ SC/IM: CPT | Performed by: NURSE PRACTITIONER

## 2023-04-25 PROCEDURE — 63600175 PHARM REV CODE 636 W HCPCS: Mod: JZ | Performed by: INTERNAL MEDICINE

## 2023-04-25 PROCEDURE — G0378 HOSPITAL OBSERVATION PER HR: HCPCS

## 2023-04-25 PROCEDURE — 96375 TX/PRO/DX INJ NEW DRUG ADDON: CPT

## 2023-04-25 PROCEDURE — 90935 HEMODIALYSIS ONE EVALUATION: CPT

## 2023-04-25 RX ORDER — PANTOPRAZOLE SODIUM 40 MG/1
40 TABLET, DELAYED RELEASE ORAL DAILY
Status: DISCONTINUED | OUTPATIENT
Start: 2023-04-25 | End: 2023-04-25 | Stop reason: HOSPADM

## 2023-04-25 RX ADMIN — SUCRALFATE 1 G: 1 SUSPENSION ORAL at 06:04

## 2023-04-25 RX ADMIN — CARVEDILOL 25 MG: 12.5 TABLET, FILM COATED ORAL at 04:04

## 2023-04-25 RX ADMIN — PANTOPRAZOLE SODIUM 40 MG: 40 TABLET, DELAYED RELEASE ORAL at 08:04

## 2023-04-25 RX ADMIN — INSULIN ASPART 8 UNITS: 100 INJECTION, SOLUTION INTRAVENOUS; SUBCUTANEOUS at 01:04

## 2023-04-25 RX ADMIN — INSULIN ASPART 4 UNITS: 100 INJECTION, SOLUTION INTRAVENOUS; SUBCUTANEOUS at 07:04

## 2023-04-25 RX ADMIN — FLUOXETINE 20 MG: 20 CAPSULE ORAL at 08:04

## 2023-04-25 RX ADMIN — CARVEDILOL 25 MG: 12.5 TABLET, FILM COATED ORAL at 07:04

## 2023-04-25 RX ADMIN — MUPIROCIN: 20 OINTMENT TOPICAL at 08:04

## 2023-04-25 RX ADMIN — INSULIN DETEMIR 6 UNITS: 100 INJECTION, SOLUTION SUBCUTANEOUS at 08:04

## 2023-04-25 RX ADMIN — CLONIDINE HYDROCHLORIDE 0.1 MG: 0.1 TABLET ORAL at 03:04

## 2023-04-25 RX ADMIN — EPOETIN ALFA-EPBX 10000 UNITS: 10000 INJECTION, SOLUTION INTRAVENOUS; SUBCUTANEOUS at 11:04

## 2023-04-25 RX ADMIN — GABAPENTIN 100 MG: 100 CAPSULE ORAL at 03:04

## 2023-04-25 RX ADMIN — LEVETIRACETAM 500 MG: 500 TABLET, FILM COATED ORAL at 08:04

## 2023-04-25 RX ADMIN — ISOSORBIDE MONONITRATE 90 MG: 60 TABLET, EXTENDED RELEASE ORAL at 08:04

## 2023-04-25 RX ADMIN — AMLODIPINE BESYLATE 10 MG: 5 TABLET ORAL at 08:04

## 2023-04-25 RX ADMIN — INSULIN ASPART 8 UNITS: 100 INJECTION, SOLUTION INTRAVENOUS; SUBCUTANEOUS at 06:04

## 2023-04-25 RX ADMIN — APIXABAN 2.5 MG: 2.5 TABLET, FILM COATED ORAL at 08:04

## 2023-04-25 RX ADMIN — GABAPENTIN 100 MG: 100 CAPSULE ORAL at 08:04

## 2023-04-25 RX ADMIN — HYDRALAZINE HYDROCHLORIDE 100 MG: 25 TABLET, FILM COATED ORAL at 03:04

## 2023-04-25 RX ADMIN — SUCRALFATE 1 G: 1 SUSPENSION ORAL at 04:04

## 2023-04-25 RX ADMIN — SENNOSIDES AND DOCUSATE SODIUM 2 TABLET: 50; 8.6 TABLET ORAL at 08:04

## 2023-04-25 RX ADMIN — HYDRALAZINE HYDROCHLORIDE 100 MG: 25 TABLET, FILM COATED ORAL at 06:04

## 2023-04-25 RX ADMIN — INSULIN ASPART 4 UNITS: 100 INJECTION, SOLUTION INTRAVENOUS; SUBCUTANEOUS at 01:04

## 2023-04-25 NOTE — PLAN OF CARE
Pt clear for DC from case management standpoint. Discharging to home         04/25/23 1140   Final Note   Assessment Type Final Discharge Note   Anticipated Discharge Disposition Home   Hospital Resources/Appts/Education Provided Appointments scheduled and added to AVS

## 2023-04-25 NOTE — PROGRESS NOTES
Pharmacist Intervention IV to PO Note    Tabby Howard is a 33 y.o. female being treated with IV medication pantoprazole    Patient Data:    Vital Signs (Most Recent):  Temp: 98.4 °F (36.9 °C) (04/25/23 0645)  Pulse: 87 (04/25/23 0645)  Resp: 18 (04/25/23 0645)  BP: (!) 145/78 (04/25/23 0847)  SpO2: 95 % (04/25/23 0645)   Vital Signs (72h Range):  Temp:  [97.6 °F (36.4 °C)-100 °F (37.8 °C)]   Pulse:  [80-98]   Resp:  [16-24]   BP: (105-199)/()   SpO2:  [92 %-98 %]      CBC:  Recent Labs   Lab 04/23/23  0314 04/23/23  1740 04/24/23  0915   WBC 5.18 7.50 5.61   RBC 2.29* 3.48* 3.42*   HGB 6.7* 10.5* 10.3*   HCT 19.9* 30.3* 30.0*   PLT 57* 83* 61*   MCV 87 87 88   MCH 29.3 30.2 30.1   MCHC 33.7 34.7 34.3     CMP:     Recent Labs   Lab 04/22/23  0829 04/23/23  0314 04/24/23  0915   * 290* 513*   CALCIUM 9.0 9.0 8.9   ALBUMIN 3.4* 3.5 3.1*   PROT 7.5 7.4 7.0   * 133* 131*   K 5.4* 3.7 3.8   CO2 26 23 26   CL 89* 97 95   BUN 51* 30* 23*   CREATININE 5.7* 3.4* 3.2*   ALKPHOS 403* 355* 338*   ALT 63* 57* 44   AST 53* 50* 37   BILITOT 2.0* 3.7* 2.2*       Dietary Orders:  Diet Orders            Diet diabetic [facility]; 2000 Calorie; Renal: Diabetic starting at 04/23 1919            Based on the following criteria, this patient qualifies for intravenous to oral conversion:  [x] The patients gastrointestinal tract is functioning (tolerating medications via oral or enteral route for 24 hours and tolerating food or enteral feeds for 24 hours).  [x] The patient is not scheduled for surgery within the next 24 hours.    IV medication pantoprazole will be changed to oral medication pantoprazole 40 mg daily    Pharmacist's Name: Guerita Maloney  Pharmacist's Extension: [phone]

## 2023-04-25 NOTE — CONSULTS
INPATIENT NEPHROLOGY CONSULT   [facility]    Patient Name: Tabby Howard  Date: 04/25/2023    Reason for consultation: ESRD    Chief Complaint:   Chief Complaint   Patient presents with    Abdominal Pain    Back Pain     X today. Pt missed dialysis today.        History of Present Illness:  Ms. Howard is a 33-years-old female who presented to the ED with chief complaint of generalized abdominal pain. She reports she developed generalized abdominal pain today, severe, persistent, as per chart review she was seen 2 days consecutively at [place] due to generalized abdominal pain, she states she went on those days due to chest pain with shortness of breath rather than abdominal pain.  Reportedly had normal bowel movement yesterday.  Known history of ESRD on HD TTS, states she did have last dialysis on Thursday.  Unclear of her medication, states her mother assist with same.  Of note patient has history of chronic abdominal pain, has had previous evaluation and multiple admissions for same, suspected gastroparesis but has not had emptying study, [place] [facility] [place], gabapentin and amitriptyline were advised, however as per chart review patient perseverating on IV Dilaudid.  At outside hospital, CT abdomen and pelvis with concern for constipation, no other acute findings, KUB nonobstructive bowel gas pattern. She was given Haldol and Compazine but was further requesting pain medication and ultimately left [facility] and presented to [facility] ED rather than outpatient dialysis.  Blood pressure elevated 187/112, on room air, afebrile.  Labs with hemoglobin 7.5, sodium 130, potassium 5.4, BUN/creatinine 51/5.7, alkaline phosphatase 403, lipase 42, glucose down to 383. Previous echocardiogram with EF of 55% with grade 3 diastolic dysfunction with moderate to severe tricuspid regurgitation with PASP 56 with pericardial effusion without tamponade.  ED provider  administered Ativan 0.4 mg. Consulted for dialysis.    Interval History:  - ordered HD/UF- got off 3L, had to give IV hyral during treatment due to high BP  - got IV BP meds overnight, Hb 6.7- will do HD/UF today with 2u of blood    afvss    Patient seen on hemodialysis for uremic clearance and ultrafiltration.        Plan of Care:    Assessment:  ESRD on HD TTS  HTN urgency  Hyponatremia  Hyperkalemia  SHPT  Anemia of CKD    Plan:    - ordered HD TTS, supplemental HD/UF treatment today due to high BP and need for blood transfusion  - resume home BP meds- UF 3-4L- will adjust BP meds thereafter  - renal diet, 1.5L fluid restriction  - adjust dialysate  - phos at goal  - kenyatta when bp better controlled.  Hydralazine to tid    Thank you for allowing us to participate in this patient's care. We will continue to follow.    Vital Signs:  Temp Readings from Last 3 Encounters:   23 98.4 °F (36.9 °C) (Oral)   23 98.2 °F (36.8 °C)   23 98.2 °F (36.8 °C) (Oral)       Pulse Readings from Last 3 Encounters:   23 82   23 96   23 97       BP Readings from Last 3 Encounters:   23 (!) 99/56   23 (!) 182/102   23 (!) 184/120       Weight:  Wt Readings from Last 3 Encounters:   23 54 kg (119 lb)   23 54 kg (119 lb 0.8 oz)   23 54 kg (119 lb)       Past Medical & Surgical History:  Past Medical History:   Diagnosis Date    ESRD on hemodialysis 2022    Gastritis 2022    EGD was 21    Gastroparesis 2022    has not had Emptying study    Heart failure with preserved ejection fraction 2022    EF 55% on 3/22    History of supraventricular tachycardia     Hyperkalemia 2022    Hypertensive emergency 2022    Sickle cell trait 2022    Type 2 diabetes mellitus        Past Surgical History:   Procedure Laterality Date     SECTION      x 3    COLONOSCOPY      COLONOSCOPY N/A 2022    Procedure: COLONOSCOPY;   Surgeon: Jagdeep Cedeno MD;  Location: [facility] ENDO;  Service: Endoscopy;  Laterality: N/A;    ESOPHAGOGASTRODUODENOSCOPY N/A 10/18/2019    Procedure: ESOPHAGOGASTRODUODENOSCOPY (EGD);  Surgeon: Gianluca Mendez MD;  Location: [facility] ENDO;  Service: Endoscopy;  Laterality: N/A;    ESOPHAGOGASTRODUODENOSCOPY N/A 08/24/2022    Procedure: EGD (ESOPHAGOGASTRODUODENOSCOPY);  Surgeon: Micky Paredes III, MD;  Location: [facility] ENDO;  Service: Endoscopy;  Laterality: N/A;    ESOPHAGOGASTRODUODENOSCOPY N/A 12/5/2022    Procedure: EGD (ESOPHAGOGASTRODUODENOSCOPY);  Surgeon: Marcelo Zhong MD;  Location: [facility] ENDO;  Service: Endoscopy;  Laterality: N/A;    LAPAROSCOPIC CHOLECYSTECTOMY N/A 07/30/2022    Procedure: CHOLECYSTECTOMY, LAPAROSCOPIC;  Surgeon: Grey Perez MD;  Location: [facility] OR;  Service: General;  Laterality: N/A;    PLACEMENT OF DUAL-LUMEN VASCULAR CATHETER Left 07/12/2022    Procedure: INSERTION-CATHETER-JOSEPH;  Surgeon: Dionte Gan MD;  Location: [facility] OR;  Service: General;  Laterality: Left;    PLACEMENT OF DUAL-LUMEN VASCULAR CATHETER Right 07/26/2022    Procedure: INSERTION-CATHETER-Hemosplit;  Surgeon: Dionte Gan MD;  Location: [facility] OR;  Service: General;  Laterality: Right;       Past Social History:  Social History     Socioeconomic History    Marital status:    Tobacco Use    Smoking status: Never    Smokeless tobacco: Never   Substance and Sexual Activity    Alcohol use: Not Currently    Drug use: No    Sexual activity: Not Currently     Partners: Male     Birth control/protection: I.U.D.     Social Determinants of Health     Financial Resource Strain: Unknown    Difficulty of Paying Living Expenses: Patient refused   Food Insecurity: Unknown    Worried About Running Out of Food in the Last Year: Patient refused    Ran Out of Food in the Last Year: Patient refused   Transportation Needs: Unknown    Lack of Transportation (Medical): Patient refused    Lack of Transportation  (Non-Medical): Patient refused   Physical Activity: Unknown    Days of Exercise per Week: Patient refused    Minutes of Exercise per Session: Patient refused   Stress: Unknown    Feeling of Stress : Patient refused   Social Connections: Unknown    Frequency of Communication with Friends and Family: Patient refused    Frequency of Social Gatherings with Friends and Family: Patient refused    Attends Adventism Services: Patient refused    Active Member of Clubs or Organizations: Patient refused    Attends Club or Organization Meetings: Patient refused    Marital Status: Patient refused   Housing Stability: Unknown    Unable to Pay for Housing in the Last Year: Patient refused    Unstable Housing in the Last Year: Patient refused       Medications:  Scheduled Meds:   amitriptyline  50 mg Oral QHS    amLODIPine  10 mg Oral Daily    apixaban  2.5 mg Oral BID    carvediloL  25 mg Oral BID WM    epoetin teresa-epbx  10,000 Units Intravenous Every Tues, Thurs, Sat    FLUoxetine  20 mg Oral Daily    gabapentin  100 mg Oral TID    hydrALAZINE  100 mg Oral Q8H    insulin aspart U-100  4 Units Subcutaneous TIDWM    insulin detemir U-100 (Levemir)  6 Units Subcutaneous Daily    isosorbide mononitrate  90 mg Oral Daily    levETIRAcetam  500 mg Oral BID    mupirocin   Nasal BID    pantoprazole  40 mg Oral Daily    senna-docusate 8.6-50 mg  2 tablet Oral BID    sucralfate  1 g Oral QID (AC & HS)     Continuous Infusions:  PRN Meds:.sodium chloride, acetaminophen, cloNIDine, dextrose 50%, dextrose 50%, dicyclomine, glucagon (human recombinant), glucose, glucose, hydrALAZINE, insulin aspart U-100, melatonin, naloxone, ondansetron, prochlorperazine, sodium chloride 0.9%, traZODone  No current facility-administered medications on file prior to encounter.     Current Outpatient Medications on File Prior to Encounter   Medication Sig Dispense Refill    albuterol (PROVENTIL/VENTOLIN HFA) 90 mcg/actuation inhaler Inhale 2 puffs into the  "lungs every 6 (six) hours as needed for Wheezing. Rescue 18 g 3    amitriptyline (ELAVIL) 50 MG tablet Take 1 tablet (50 mg total) by mouth every evening. 30 tablet 6    apixaban (ELIQUIS) 5 mg Tab Take 1 tablet (5 mg total) by mouth 2 (two) times daily. 60 tablet 2    carvediloL (COREG) 25 MG tablet Take 1 tablet twice a day by oral route for 30 days. (Patient taking differently: Take 25 mg by mouth 2 (two) times daily.) 60 tablet 2    dicyclomine (BENTYL) 10 MG capsule Take 1 capsule (10 mg total) by mouth 4 (four) times daily as needed (abdominal pain). 20 capsule 0    FLUoxetine 20 MG capsule Take 1 capsule every day by oral route for 30 days. (Patient taking differently: Take 20 mg by mouth once daily.) 30 capsule 2    gabapentin (NEURONTIN) 100 MG capsule Take 1 capsule by mouth 3 times daily (Patient taking differently: Take 100 mg by mouth 3 (three) times daily.) 90 capsule 2    insulin aspart U-100 (NOVOLOG) 100 unit/mL (3 mL) InPn pen Inject 4 Units into the skin 3 (three) times daily with meals. 15 mL 3    insulin detemir U-100, Levemir, 100 unit/mL (3 mL) SubQ InPn pen Inject 6 Units into the skin 2 (two) times daily. 15 mL 3    levETIRAcetam (KEPPRA) 500 MG Tab Take 1 tablet twice a day by oral route for 30 days. (Patient taking differently: Take 500 mg by mouth 2 (two) times daily.) 60 tablet 2    ondansetron (ZOFRAN-ODT) 4 MG TbDL Take 1 tablet (4 mg total) by mouth every 6 (six) hours as needed. 20 tablet 0    pantoprazole (PROTONIX) 40 MG tablet Take 1 tablet every day by oral route for 30 days. (Patient taking differently: Take 40 mg by mouth once daily.) 30 tablet 2    pen needle, diabetic 31 gauge x 3/16" Ndle Use as directed to inject insulin 5 times daily 100 each 11    sucralfate (CARAFATE) 100 mg/mL suspension Take 10 mLs (1 g total) by mouth 4 (four) times daily before meals and nightly. 1200 mL 0    [DISCONTINUED] atenoloL (TENORMIN) 50 MG tablet Take 1 tablet (50 mg total) by mouth every " other day. 45 tablet 3    [DISCONTINUED] omeprazole (PRILOSEC) 20 MG capsule Take 2 capsules (40 mg total) by mouth once daily. for 10 days 20 capsule 0       Allergies:  Penicillins    Past Family History:  Reviewed; refer to Hospitalist Admission Note    Review of Systems:  Review of Systems - All 14 systems reviewed and negative, except as noted in HPI    Physical Exam:  General Appearance:    NAD, AAO x 3, cooperative, appears stated age   Head:    Normocephalic, atraumatic   Eyes:    PER, EOMI, and conjunctiva/sclera clear bilaterally       Mouth:   Moist mucus membranes, no thrush or oral lesions,       normal dentition   Back:     No CVA tenderness   Lungs:     Clear to auscultation bilaterally, no wheezes, crackles,           rales or rhonchi, symmetric air movement, respirations unlabored   Chest wall:    No tenderness or deformity   Heart:    Regular rate and rhythm, S1 and S2 normal, no murmur, rub   or gallop   Abdomen:     Soft, non-tender, non-distended, bowel sounds active all four   quadrants, no RT or guarding, no masses, no organomegaly   Extremities:   Warm and well perfused, distal pulses are intact, no             cyanosis or peripheral edema   MSK:   No joint or muscle swelling, tenderness or deformity   Skin:   Skin color, texture, turgor normal, no rashes or lesions   Neurologic/Psychiatric:   CNII-XII intact, normal strength and sensation       throughout, no asterixis; normal affect, memory, judgement     and insight      Results:  Lab Results   Component Value Date     (L) 04/24/2023    K 3.8 04/24/2023    CL 95 04/24/2023    CO2 26 04/24/2023    BUN 23 (H) 04/24/2023    CREATININE 3.2 (H) 04/24/2023    CALCIUM 8.9 04/24/2023    ANIONGAP 10 04/24/2023    ESTGFRAFRICA 20 (A) 07/31/2022    EGFRNONAA 18 (A) 07/31/2022       Lab Results   Component Value Date    CALCIUM 8.9 04/24/2023    PHOS 5.4 (H) 04/22/2023       No results for input(s): WBC, RBC, HGB, HCT, PLT, MCV, MCH, MCHC in the  last 24 hours.      I have personally reviewed pertinent radiological imaging and reports.    Patient care was time spent personally by me on the following activities:   Obtaining a history  Examination of patient.  Providing medical care at the patients bedside.  Developing a treatment plan with patient or surrogate and bedside caregivers  Ordering and reviewing laboratory studies, radiographic studies, pulse oximetry.  Ordering and performing treatments and interventions.  Evaluation of patient's response to treatment.  Discussions with consultants while on the unit and immediately available to the patient.  Re-evaluation of the patient's condition.  Documentation in the medical record.     Hugh Figueroa MD  Nephrology  [facility]  [phone]

## 2023-04-25 NOTE — NURSING
Patient D/C via MD order. Telemetry and PIV removed. Discharge instructions reviewed with patient. Patient transported to vehicle via W/C.    2017 15:23

## 2023-04-25 NOTE — DISCHARGE SUMMARY
[facility] Medicine  Discharge Summary      Patient Name: Tabby Howard  MRN: [id number]  [facility]: [id number]  Patient Class: OP- Observation  Admission Date: 4/22/2023  Hospital Length of Stay: 0 days  Discharge Date and Time: 4/25/2023  5:31 PM  Attending Physician: Jason Peres MD   Discharging Provider: Emily Bennett NP  Primary Care Provider: AIDEN CONNER NP    Primary Care Team: Networked reference to record PCT     HPI:   Ms. Howard is a 33-years-old female who presented to the ED with chief complaint of generalized abdominal pain.  At times difficult to obtain full history from patient.  Seen during dialysis.  She reports she developed generalized abdominal pain today, severe, persistent, as per chart review she was seen 2 days consecutively at [place] ED due to generalized abdominal pain, she states she went on those days due to chest pain with shortness of breath rather than abdominal pain.  Reportedly had normal bowel movement yesterday.  Known history of ESRD on HD TTS, states she did have last dialysis on Thursday.  Unclear of her medication, states her mother assist with same.  Of note patient has history of chronic abdominal pain, has had previous evaluation and multiple admissions for same, suspected gastroparesis but has not had emptying study, last admission was at [facility] with recommendation for multimodal approach, gabapentin and amitriptyline were advised, however as per chart review patient perseverating on IV Dilaudid.  At outside hospital, CT abdomen and pelvis with concern for constipation, no other acute findings, KUB nonobstructive bowel gas pattern.  She was given Haldol and Compazine but was further requesting pain medication and ultimately left [facility] and presented to [facility] ED rather than outpatient dialysis.  Blood pressure elevated 187/112, on room air, afebrile.  Labs with hemoglobin 7.5, sodium 130, potassium 5.4, BUN/creatinine 51/5.7, alkaline  phosphatase 403, lipase 42, glucose down to 383.  Previous echocardiogram with EF of 55% with grade 3 diastolic dysfunction with moderate to severe tricuspid regurgitation with PASP 56 with pericardial effusion without tamponade.  ED provider administered Ativan 0.4 mg.  Case discussed with ED provider who requested admission, states discussed with Nephrology and planning  dialysis today.  Discussed with dialysis nurse.      * No surgery found *      Hospital Course:   Pt was monitored closely during her stay.  She had HTN which improved with resumption of medication.  She was dialyzed with resolution of her hyperkalemia.  Her H&H dropped.  I discussed with Nephrology.  She is not been getting her Epogen due to HTN and noncompliance.  She was transfused 2 units of packed red blood cells on HD.  She had no evidence of bleeding.  She complained of abdominal pain which is chronic.  She is undergone multiple CTs and was evaluated by GI just last week who recommended avoidance of narcotics.  An echo was done to re-evaluate her pericardial effusion which showed decrease in size with no evidence of tamponade.  Her H&H stabilized.  She continued to be hypertensive.  Her medications were adjusted with good resolve and normalization of BP.  She was also noted to be hyperglycemic.  Her home Levemir was restarted and she was treated with sliding scale insulin.  She did experience hypoglycemia which resolved well with oral intake.  An ambulatory referral was placed to gastroenterology for chronic abdominal pain with no inpatient acute needs for GI evaluation identified at this time.  Patient underwent dialysis prior to discharge.  Discharge instructions as well as return precautions were discussed with patient with good understanding.  Patient was evaluated on day of discharge and deemed appropriate.           Goals of Care Treatment Preferences:  Code Status: Full Code    Health care agent: Step-Mother Tanya Howard / Father  [facility] agent number: [phone] / [phone]          What is most important right now is to focus on remaining as independent as possible.  Accordingly, we have decided that the best plan to meet the patient's goals includes continuing with treatment.      Consults:   Consults (From admission, onward)          Status Ordering Provider     Inpatient consult to Nephrology  Once        Provider:  Prateek Clark MD    Completed MOLLY EDMONDS            No new Assessment & Plan notes have been filed under this hospital service since the last note was generated.  Service: Hospital Medicine    Final Active Diagnoses:    Diagnosis Date Noted POA    PRINCIPAL PROBLEM:  Abdominal pain [R10.9] 01/24/2023 Yes    Hyperkalemia [E87.5] 04/22/2023 Yes    Chronic deep vein thrombosis (DVT) of left upper extremity [I82.722] 04/10/2023 Yes    Mood disorder [F39] 04/10/2023 Yes    Anemia in ESRD (end-stage renal disease) [N18.6, D63.1] 03/20/2023 Yes    Frequent hospital admissions [Z78.9] 03/10/2023 Yes    Type 2 diabetes mellitus with hyperglycemia, with long-term current use of insulin, previous HbA1c 5.8% [E11.65, Z79.4] 01/27/2023 Not Applicable    Thrombocytopenia [D69.6] 10/26/2022 Yes     Chronic    Uncontrolled hypertension [I10] 07/30/2022 Yes    ESRD (end stage renal disease) on dialysis [N18.6, Z99.2] 07/22/2022 Not Applicable     Chronic    Seizure disorder [G40.909] 05/29/2022 Yes     Chronic    Gastroparesis - suspected, unconfirmed [K31.84] 04/12/2022 Yes     Chronic    Pericardial effusion [I31.39] 03/07/2022 Yes      Problems Resolved During this Admission:       Discharged Condition: good    Disposition: Home or Self Care    Follow Up:   Follow-up Information       AIDEN CONNER NP Follow up on 5/3/2023.    Specialty: Nurse Practitioner  Why: @8:45am  Contact information:  [place] [id number]  [phone]               Prateek Clark MD Follow up.     Specialty: Nephrology  Contact information:  [address]  [facility]  [place] [id number]  [phone]                           Patient Instructions:      Ambulatory referral/consult to Gastroenterology   Standing Status: Future   Referral Priority: Routine Referral Type: Consultation   Referral Reason: Specialty Services Required   Requested Specialty: Gastroenterology   Number of Visits Requested: 1     Diet renal     Diet Cardiac     Diet diabetic     Diet diabetic     Diet renal     Notify your health care provider if you experience any of the following:  persistent nausea and vomiting or diarrhea     Notify your health care provider if you experience any of the following:  severe uncontrolled pain     Activity as tolerated       Significant Diagnostic Studies: Labs:   CMP   Recent Labs   Lab 04/24/23  0915   *   K 3.8   CL 95   CO2 26   *   BUN 23*   CREATININE 3.2*   CALCIUM 8.9   PROT 7.0   ALBUMIN 3.1*   BILITOT 2.2*   ALKPHOS 338*   AST 37   ALT 44   ANIONGAP 10   , CBC   Recent Labs   Lab 04/23/23  1740 04/24/23  0915   WBC 7.50 5.61   HGB 10.5* 10.3*   HCT 30.3* 30.0*   PLT 83* 61*    and All labs within the past 24 hours have been reviewed    Pending Diagnostic Studies:       None           Medications:  Reconciled Home Medications:      Medication List        CHANGE how you take these medications      amLODIPine 5 MG tablet  Commonly known as: NORVASC  Take 2 tablets (10 mg total) by mouth once daily.  What changed: how much to take     carvediloL 25 MG tablet  Commonly known as: COREG  Take 1 tablet twice a day by oral route for 30 days.  What changed:   how much to take  how to take this  when to take this     FLUoxetine 20 MG capsule  Take 1 capsule every day by oral route for 30 days.  What changed:   how much to take  how to take this  when to take this     gabapentin 100 MG capsule  Commonly known as: NEURONTIN  Take 1 capsule by mouth 3 times daily  What  "changed:   how much to take  how to take this  when to take this     hydrALAZINE 100 MG tablet  Commonly known as: APRESOLINE  Take 1 tablet (100 mg total) by mouth every 8 (eight) hours.  What changed:   how much to take  how to take this  when to take this     isosorbide mononitrate 30 MG 24 hr tablet  Commonly known as: IMDUR  Take 3 tablets (90 mg total) by mouth once daily.  What changed:   medication strength  how much to take  when to take this     levETIRAcetam 500 MG Tab  Commonly known as: KEPPRA  Take 1 tablet twice a day by oral route for 30 days.  What changed:   how much to take  when to take this     pantoprazole 40 MG tablet  Commonly known as: PROTONIX  Take 1 tablet every day by oral route for 30 days.  What changed:   how much to take  when to take this            CONTINUE taking these medications      albuterol 90 mcg/actuation inhaler  Commonly known as: PROVENTIL/VENTOLIN HFA  Inhale 2 puffs into the lungs every 6 (six) hours as needed for Wheezing. Rescue     amitriptyline 50 MG tablet  Commonly known as: ELAVIL  Take 1 tablet (50 mg total) by mouth every evening.     BD ULTRA-FINE MINI PEN NEEDLE 31 gauge x 3/16" Ndle  Generic drug: pen needle, diabetic  Use as directed to inject insulin 5 times daily     dicyclomine 10 MG capsule  Commonly known as: BENTYL  Take 1 capsule (10 mg total) by mouth 4 (four) times daily as needed (abdominal pain).     ELIQUIS 5 mg Tab  Generic drug: apixaban  Take 1 tablet (5 mg total) by mouth 2 (two) times daily.     insulin detemir U-100 (Levemir) 100 unit/mL (3 mL) Inpn pen  Inject 6 Units into the skin 2 (two) times daily.     NovoLOG FlexPen U-100 Insulin 100 unit/mL (3 mL) Inpn pen  Generic drug: insulin aspart U-100  Inject 4 Units into the skin 3 (three) times daily with meals.     ondansetron 4 MG Tbdl  Commonly known as: ZOFRAN-ODT  Take 1 tablet (4 mg total) by mouth every 6 (six) hours as needed.     sucralfate 100 mg/mL suspension  Commonly known " as: CARAFATE  Take 10 mLs (1 g total) by mouth 4 (four) times daily before meals and nightly.              Indwelling Lines/Drains at time of discharge:   Lines/Drains/Airways       Central Venous Catheter Line  Duration                  Hemodialysis Catheter 12/09/22 right subclavian 137 days                    Time spent on the discharge of patient: 32 minutes         Emily Bennett NP  Department of Hospital Medicine  [facility]

## 2023-04-25 NOTE — PROGRESS NOTES
Consent and hepatitis status confirmed prior to treatment, net uf 1746cc, lines flushed with normal saline clamped and capped, report called to Rachna Fair   04/25/23 1300   Required for all Hemodialysis Patients   Hepatitis Status negative   Handoff Report   Received From [place]   Given To Rachna Fair   Treatment Type   Treatment Type Acute   Vital Signs   Temp 98.5 °F (36.9 °C)   Pulse 71   Resp 16   SpO2 99 %   BP (!) 135/105   Assessments (Pre/Post)   Consent Obtained yes   Safety vein preservation armband present   Date Hepatitis Profile Obtained 02/27/23   Blood Liters Processed (BLP) 63.2   Transport Modality bed   Level of Consciousness (AVPU) alert   Dialyzer Clearance mildly streaked   Pain   Preferred Pain Scale number (Numeric Rating Pain Scale)   Pain Rating (0-10): Rest 0        Hemodialysis Catheter 12/09/22 right subclavian   Placement Date: 12/09/22   Location: right subclavian   Line Necessity Review CRRT/HD   Verification by X-ray Yes   Site Assessment No drainage;No redness;No swelling;No warmth   Line Securement Device Secured with sutures   Dressing Type Central line dressing   Dressing Status Clean;Dry;Intact   Dressing Intervention Integrity maintained   Date on Dressing 04/24/23   Dressing Due to be Changed 05/01/23   Venous Patency/Care flushed w/o difficulty;heparin locked;blood return present   Arterial Patency/Care flushed w/o difficulty;heparin locked;blood return present   Post-Hemodialysis Assessment   Rinseback Volume (mL) 250 mL   Blood Volume Processed (Liters) 63.2 L   Dialyzer Clearance Lightly streaked   Duration of Treatment 180 minutes   Additional Fluid Intake (mL) 500 mL   Total UF (mL) 2246 mL   Net Fluid Removal 1746   Patient Response to Treatment tolerated well   Post-Treatment Weight 52.3 kg (115 lb 4.8 oz)   Treatment Weight Change -1.7   Post-Hemodialysis Comments treatment completed

## 2023-04-27 ENCOUNTER — PATIENT MESSAGE (OUTPATIENT)
Dept: HEMATOLOGY/ONCOLOGY | Facility: CLINIC | Age: 33
End: 2023-04-27
Payer: MEDICAID

## 2023-05-03 ENCOUNTER — HOSPITAL ENCOUNTER (EMERGENCY)
Facility: HOSPITAL | Age: 33
Discharge: HOME | End: 2023-05-03
Attending: EMERGENCY MEDICINE
Payer: MEDICAID

## 2023-05-03 VITALS
OXYGEN SATURATION: 96 % | DIASTOLIC BLOOD PRESSURE: 98 MMHG | HEART RATE: 99 BPM | TEMPERATURE: 98 F | BODY MASS INDEX: 21.9 KG/M2 | WEIGHT: 119 LBS | RESPIRATION RATE: 17 BRPM | SYSTOLIC BLOOD PRESSURE: 180 MMHG | HEIGHT: 62 IN

## 2023-05-03 DIAGNOSIS — G89.29 OTHER CHRONIC PAIN: ICD-10-CM

## 2023-05-03 DIAGNOSIS — N18.6 ESRD (END STAGE RENAL DISEASE) ON DIALYSIS: Chronic | ICD-10-CM

## 2023-05-03 DIAGNOSIS — Z99.2 ESRD (END STAGE RENAL DISEASE) ON DIALYSIS: Chronic | ICD-10-CM

## 2023-05-03 DIAGNOSIS — R07.89 ATYPICAL CHEST PAIN: ICD-10-CM

## 2023-05-03 DIAGNOSIS — K59.00 CONSTIPATION, UNSPECIFIED CONSTIPATION TYPE: Primary | ICD-10-CM

## 2023-05-03 DIAGNOSIS — I10 UNCONTROLLED HYPERTENSION: ICD-10-CM

## 2023-05-03 LAB
ALBUMIN SERPL BCP-MCNC: 3.5 G/DL (ref 3.5–5.2)
ALP SERPL-CCNC: 243 U/L (ref 55–135)
ALT SERPL W/O P-5'-P-CCNC: 29 U/L (ref 10–44)
ANION GAP SERPL CALC-SCNC: 12 MMOL/L (ref 8–16)
AST SERPL-CCNC: 31 U/L (ref 10–40)
BASOPHILS # BLD AUTO: 0.02 K/UL (ref 0–0.2)
BASOPHILS NFR BLD: 0.4 % (ref 0–1.9)
BILIRUB SERPL-MCNC: 1.4 MG/DL (ref 0.1–1)
BNP SERPL-MCNC: 1613 PG/ML (ref 0–99)
BUN SERPL-MCNC: 35 MG/DL (ref 6–20)
CALCIUM SERPL-MCNC: 9.4 MG/DL (ref 8.7–10.5)
CHLORIDE SERPL-SCNC: 97 MMOL/L (ref 95–110)
CO2 SERPL-SCNC: 27 MMOL/L (ref 23–29)
CREAT SERPL-MCNC: 3.7 MG/DL (ref 0.5–1.4)
DIFFERENTIAL METHOD: ABNORMAL
EOSINOPHIL # BLD AUTO: 0.1 K/UL (ref 0–0.5)
EOSINOPHIL NFR BLD: 1.5 % (ref 0–8)
ERYTHROCYTE [DISTWIDTH] IN BLOOD BY AUTOMATED COUNT: 15.6 % (ref 11.5–14.5)
EST. GFR  (NO RACE VARIABLE): 15.8 ML/MIN/1.73 M^2
GLUCOSE SERPL-MCNC: 271 MG/DL (ref 70–110)
HCT VFR BLD AUTO: 24.6 % (ref 37–48.5)
HGB BLD-MCNC: 8.4 G/DL (ref 12–16)
IMM GRANULOCYTES # BLD AUTO: 0.02 K/UL (ref 0–0.04)
IMM GRANULOCYTES NFR BLD AUTO: 0.4 % (ref 0–0.5)
LIPASE SERPL-CCNC: 34 U/L (ref 4–60)
LYMPHOCYTES # BLD AUTO: 0.9 K/UL (ref 1–4.8)
LYMPHOCYTES NFR BLD: 15.6 % (ref 18–48)
MAGNESIUM SERPL-MCNC: 1.9 MG/DL (ref 1.6–2.6)
MCH RBC QN AUTO: 29.9 PG (ref 27–31)
MCHC RBC AUTO-ENTMCNC: 34.1 G/DL (ref 32–36)
MCV RBC AUTO: 88 FL (ref 82–98)
MONOCYTES # BLD AUTO: 0.6 K/UL (ref 0.3–1)
MONOCYTES NFR BLD: 11.6 % (ref 4–15)
NEUTROPHILS # BLD AUTO: 3.8 K/UL (ref 1.8–7.7)
NEUTROPHILS NFR BLD: 70.5 % (ref 38–73)
NRBC BLD-RTO: 0 /100 WBC
PLATELET # BLD AUTO: 111 K/UL (ref 150–450)
PMV BLD AUTO: 10.4 FL (ref 9.2–12.9)
POTASSIUM SERPL-SCNC: 3.8 MMOL/L (ref 3.5–5.1)
PROT SERPL-MCNC: 7.6 G/DL (ref 6–8.4)
RBC # BLD AUTO: 2.81 M/UL (ref 4–5.4)
SODIUM SERPL-SCNC: 136 MMOL/L (ref 136–145)
TROPONIN I SERPL HS-MCNC: 34.4 PG/ML (ref 0–14.9)
TROPONIN I SERPL HS-MCNC: 35.8 PG/ML (ref 0–14.9)
WBC # BLD AUTO: 5.44 K/UL (ref 3.9–12.7)

## 2023-05-03 PROCEDURE — 85025 COMPLETE CBC W/AUTO DIFF WBC: CPT | Performed by: EMERGENCY MEDICINE

## 2023-05-03 PROCEDURE — 96376 TX/PRO/DX INJ SAME DRUG ADON: CPT

## 2023-05-03 PROCEDURE — 93010 ELECTROCARDIOGRAM REPORT: CPT | Mod: ,,, | Performed by: INTERNAL MEDICINE

## 2023-05-03 PROCEDURE — 84484 ASSAY OF TROPONIN QUANT: CPT | Performed by: EMERGENCY MEDICINE

## 2023-05-03 PROCEDURE — 96374 THER/PROPH/DIAG INJ IV PUSH: CPT

## 2023-05-03 PROCEDURE — 83690 ASSAY OF LIPASE: CPT | Performed by: EMERGENCY MEDICINE

## 2023-05-03 PROCEDURE — 96375 TX/PRO/DX INJ NEW DRUG ADDON: CPT | Mod: 59

## 2023-05-03 PROCEDURE — 93005 ELECTROCARDIOGRAM TRACING: CPT | Performed by: INTERNAL MEDICINE

## 2023-05-03 PROCEDURE — 63600175 PHARM REV CODE 636 W HCPCS: Performed by: EMERGENCY MEDICINE

## 2023-05-03 PROCEDURE — 93010 EKG 12-LEAD: ICD-10-PCS | Mod: ,,, | Performed by: INTERNAL MEDICINE

## 2023-05-03 PROCEDURE — 83880 ASSAY OF NATRIURETIC PEPTIDE: CPT | Performed by: EMERGENCY MEDICINE

## 2023-05-03 PROCEDURE — 83735 ASSAY OF MAGNESIUM: CPT | Performed by: EMERGENCY MEDICINE

## 2023-05-03 PROCEDURE — 80053 COMPREHEN METABOLIC PANEL: CPT | Performed by: EMERGENCY MEDICINE

## 2023-05-03 PROCEDURE — 99285 EMERGENCY DEPT VISIT HI MDM: CPT | Mod: 25

## 2023-05-03 PROCEDURE — 25500020 PHARM REV CODE 255: Performed by: EMERGENCY MEDICINE

## 2023-05-03 RX ORDER — HYDROMORPHONE HYDROCHLORIDE 1 MG/ML
0.5 INJECTION, SOLUTION INTRAMUSCULAR; INTRAVENOUS; SUBCUTANEOUS
Status: COMPLETED | OUTPATIENT
Start: 2023-05-03 | End: 2023-05-03

## 2023-05-03 RX ORDER — HYDRALAZINE HYDROCHLORIDE 20 MG/ML
10 INJECTION INTRAMUSCULAR; INTRAVENOUS
Status: COMPLETED | OUTPATIENT
Start: 2023-05-03 | End: 2023-05-03

## 2023-05-03 RX ORDER — ONDANSETRON 2 MG/ML
4 INJECTION INTRAMUSCULAR; INTRAVENOUS
Status: COMPLETED | OUTPATIENT
Start: 2023-05-03 | End: 2023-05-03

## 2023-05-03 RX ORDER — IODIXANOL 320 MG/ML
100 INJECTION, SOLUTION INTRAVASCULAR
Status: COMPLETED | OUTPATIENT
Start: 2023-05-03 | End: 2023-05-03

## 2023-05-03 RX ORDER — LORAZEPAM 2 MG/ML
1 INJECTION INTRAMUSCULAR
Status: COMPLETED | OUTPATIENT
Start: 2023-05-03 | End: 2023-05-03

## 2023-05-03 RX ORDER — LACTULOSE 10 G/15ML
10 SOLUTION ORAL 3 TIMES DAILY
Qty: 225 ML | Refills: 0 | Status: SHIPPED | OUTPATIENT
Start: 2023-05-03 | End: 2023-05-08

## 2023-05-03 RX ADMIN — LORAZEPAM 1 MG: 2 INJECTION INTRAMUSCULAR; INTRAVENOUS at 02:05

## 2023-05-03 RX ADMIN — LORAZEPAM 1 MG: 2 INJECTION INTRAMUSCULAR; INTRAVENOUS at 09:05

## 2023-05-03 RX ADMIN — ONDANSETRON 4 MG: 2 INJECTION INTRAMUSCULAR; INTRAVENOUS at 11:05

## 2023-05-03 RX ADMIN — IODIXANOL 100 ML: 320 INJECTION, SOLUTION INTRAVASCULAR at 03:05

## 2023-05-03 RX ADMIN — HYDRALAZINE HYDROCHLORIDE 10 MG: 20 INJECTION INTRAMUSCULAR; INTRAVENOUS at 11:05

## 2023-05-03 RX ADMIN — HYDROMORPHONE HYDROCHLORIDE 0.5 MG: 0.5 INJECTION, SOLUTION INTRAMUSCULAR; INTRAVENOUS; SUBCUTANEOUS at 11:05

## 2023-05-03 NOTE — CONSULTS
Attempted to meet with patient at bedside to provide community resources. Patient appeared to be sleeping and did not arouse to vocal stimulation.  tried 3x. Left a [facility] Resource packet at bedside, a complete 23 page resource guide for all available resources in [facility].

## 2023-05-03 NOTE — ED NOTES
Walked in room to find pt had taken her BP cuff off. Pt educated to keep it on due to needing to monitor her BP.

## 2023-05-03 NOTE — DISCHARGE INSTRUCTIONS
Please follow-up with your primary care provider as directed   Take all of your home medications as directed

## 2023-05-03 NOTE — ED NOTES
Pt found putting her finger down her throat to induce vomiting, she was asked to stop. Emesis noted on the floor beside bed that was clear.

## 2023-05-03 NOTE — ED NOTES
Dsg change done to right chest wall on hemodialysis catheter. Dried blood was noted at site. Site cleaned with NS and patted dry, no active bleeding at site. CHG tegaderm placed and dated, pt tolerated well.

## 2023-05-03 NOTE — ED PROVIDER NOTES
Encounter Date: 5/3/2023       History     Chief Complaint   Patient presents with    Vomiting    Chest Pain     33-year-old female presents emergency department via EMS for emergent evaluation of pain to her right port.  Patient has a past medical history of end-stage renal disease on hemodialysis  and Saturday.  Last dialysis was yesterday.  Patient reports that she had a recent port placed few days prior and [facility].  The patient reports that she did receive dialysis through her port without incident however today it is hurting.  The patient is thrashing around in bed complaining of pain now to her chest abdomen and port.  She is had no associated fevers nausea or vomiting    Review of patient's allergies indicates:   Allergen Reactions    Penicillins Hives     Past Medical History:   Diagnosis Date    ESRD on hemodialysis 2022    Gastritis 2022    EGD was 21    Gastroparesis 2022    has not had Emptying study    Heart failure with preserved ejection fraction 2022    EF 55% on 3/22    History of supraventricular tachycardia     Hyperkalemia 2022    Hypertensive emergency 2022    Sickle cell trait 2022    Type 2 diabetes mellitus      Past Surgical History:   Procedure Laterality Date     SECTION      x 3    COLONOSCOPY      COLONOSCOPY N/A 2022    Procedure: COLONOSCOPY;  Surgeon: Jagdeep Cedeno MD;  Location: [facility];  Service: Endoscopy;  Laterality: N/A;    ESOPHAGOGASTRODUODENOSCOPY N/A 10/18/2019    Procedure: ESOPHAGOGASTRODUODENOSCOPY (EGD);  Surgeon: Gianluca Mendez MD;  Location: [facility];  Service: Endoscopy;  Laterality: N/A;    ESOPHAGOGASTRODUODENOSCOPY N/A 2022    Procedure: EGD (ESOPHAGOGASTRODUODENOSCOPY);  Surgeon: Micky Paredes III, MD;  Location: [facility];  Service: Endoscopy;  Laterality: N/A;    ESOPHAGOGASTRODUODENOSCOPY N/A 2022    Procedure: EGD (ESOPHAGOGASTRODUODENOSCOPY);  Surgeon:  Marcelo Zhong MD;  Location: [facility] ENDO;  Service: Endoscopy;  Laterality: N/A;    LAPAROSCOPIC CHOLECYSTECTOMY N/A 07/30/2022    Procedure: CHOLECYSTECTOMY, LAPAROSCOPIC;  Surgeon: Grey Perez MD;  Location: [facility] OR;  Service: General;  Laterality: N/A;    PLACEMENT OF DUAL-LUMEN VASCULAR CATHETER Left 07/12/2022    Procedure: INSERTION-CATHETER-JOSEPH;  Surgeon: Dionte Gan MD;  Location: [facility] OR;  Service: General;  Laterality: Left;    PLACEMENT OF DUAL-LUMEN VASCULAR CATHETER Right 07/26/2022    Procedure: INSERTION-CATHETER-Hemosplit;  Surgeon: Dionte Gan MD;  Location: [facility] OR;  Service: General;  Laterality: Right;     Family History   Problem Relation Age of Onset    Diabetes Mother     Diabetes Father      Social History     Tobacco Use    Smoking status: Never    Smokeless tobacco: Never   Substance Use Topics    Alcohol use: Not Currently    Drug use: No     Review of Systems   Constitutional: Negative.  Negative for fever.   HENT: Negative.     Respiratory: Negative.     Cardiovascular:  Positive for chest pain.   Gastrointestinal:  Positive for abdominal pain.   Genitourinary: Negative.    Musculoskeletal: Negative.    Skin: Negative.    Neurological: Negative.    Hematological: Negative.    Psychiatric/Behavioral: Negative.     All other systems reviewed and are negative.    Physical Exam     Initial Vitals   BP Pulse Resp Temp SpO2   05/03/23 0854 05/03/23 0845 05/03/23 0845 05/03/23 0854 05/03/23 0854   (!) 200/124 100 18 97.7 °F (36.5 °C) 98 %      MAP       --                Physical Exam    Constitutional:   Patient is uncooperative   HENT:   Head: Normocephalic.   Eyes: Pupils are equal, round, and reactive to light.   Cardiovascular:  Normal rate.           Pulmonary/Chest: Breath sounds normal.   Patient has a port to her right anterior chest with clotted blood surrounding site, no evidence of purulent drainage noted   Abdominal: Abdomen is soft.     Neurological: She is  alert.   Skin: Skin is warm. No rash noted.       ED Course   Procedures  Labs Reviewed   CBC W/ AUTO DIFFERENTIAL - Abnormal; Notable for the following components:       Result Value    RBC 2.81 (*)     Hemoglobin 8.4 (*)     Hematocrit 24.6 (*)     RDW 15.6 (*)     Platelets 111 (*)     Lymph # 0.9 (*)     Lymph % 15.6 (*)     All other components within normal limits   COMPREHENSIVE METABOLIC PANEL - Abnormal; Notable for the following components:    Glucose 271 (*)     BUN 35 (*)     Creatinine 3.7 (*)     Total Bilirubin 1.4 (*)     Alkaline Phosphatase 243 (*)     eGFR 15.8 (*)     All other components within normal limits   TROPONIN I HIGH SENSITIVITY - Abnormal; Notable for the following components:    Troponin I High Sensitivity 34.4 (*)     All other components within normal limits   B-TYPE NATRIURETIC PEPTIDE - Abnormal; Notable for the following components:    BNP 1,613 (*)     All other components within normal limits   TROPONIN I HIGH SENSITIVITY - Abnormal; Notable for the following components:    Troponin I High Sensitivity 35.8 (*)     All other components within normal limits   MAGNESIUM   LIPASE        ECG Results              EKG 12-lead (In process)  Result time 05/03/23 09:13:00      In process by Interface, Lab In [facility] (05/03/23 09:13:00)                   Narrative:    Test Reason : R07.9,    Vent. Rate : 100 BPM     Atrial Rate : 100 BPM     P-R Int : 174 ms          QRS Dur : 090 ms      QT Int : 400 ms       P-R-T Axes : 061 -37 048 degrees     QTc Int : 516 ms    Normal sinus rhythm  Left axis deviation  Prolonged QT  Abnormal ECG  When compared with ECG of 23-APR-2023 02:13,  Minimal criteria for Septal infarct are no longer Present    Referred By: [email or web address]   SELF           Confirmed By:                                   Imaging Results              CT Abdomen Pelvis With Contrast (Final result)  Result time 05/03/23 16:25:40      Final result by Hoang Hudson MD (05/03/23  16:25:40)                   Narrative:      EXAM: CT ABDOMEN PELVIS WITH CONTRAST    HISTORY: Abdominal pain, acute, nonlocalized. Vomiting.    COMPARISON: CT scan of the abdomen and pelvis dated 3/10/2023.    TECHNIQUE: Multiple axial 2 mm thick images were acquired through the abdomen and pelvis with IV contrast only.    This exam was performed according to our departmental dose-optimization program, which includes automated exposure control, adjustment of the mA and/or kV according to patient size and/or use of iterative reconstruction technique.    Unless otherwise stated, incidental findings do not require dedicated follow-up imaging.    FINDINGS: Images through the lung bases show marked cardiomegaly and a moderate-sized pericardial effusion. The liver, spleen, pancreas, kidneys, and adrenal glands appear within normal limits. The gallbladder is absent. There is no bile duct dilatation. The walls of the stomach appears somewhat edematous and there is mild mucosal hyperenhancement and the stomach suspicious for gastritis. The small bowel is normal in caliber. There is no evidence of small bowel obstruction..  There is a moderately large amount of formed stool throughout the colon including  hyperdense, likely desiccated formed stool moderately distending the rectum. The stool burden has increased since the preceding CT scan. The findings are compatible with constipation and probable developing fecal impaction. The urinary bladder is well distended and appears grossly normal. The uterus is grossly normal. No abnormal cystic or solid masses are identified in the pelvis.    IMPRESSION:   Probable mild gastritis. Large amount of formed stool throughout the colon including hyperdense stool distending the rectum suspicious for developing fecal impaction. Cardiomegaly. Moderate-sized pericardial effusion.    Electronically signed by:  Gianluca Hudson MD  5/3/2023 4:25 PM CDT Workstation: [id number]                                      CTA Chest Non-Coronary (PE Studies) (Final result)  Result time 05/03/23 16:16:45      Final result by Deandre Vitale Jr., MD (05/03/23 16:16:45)                   Narrative:    CMS MANDATED QUALITY DATA-CT RADIATION-436    HISTORY: Vomiting and chest pain.    TECHNICAL FACTORS: Thin axial imaging through the chest was performed with 100 mL of Omnipaque 350 IV contrast, with sagittal and coronal images, MIPS, and or 3D reconstructions performed. All CT exams at this facility use dose modulation, iterative reconstruction, and or weight based dosing when appropriate to reduce radiation dose to as low as reasonably achievable.    COMPARISON: 5/2/2023    FINDINGS:    Examination is of diagnostic quality as there is normal opacification of pulmonary arterial tree out to the tertiary order branch vessels without evidence of pruning, truncation, webbing the pulmonary arterial system. No evidence of acute pulmonary thromboembolism. Normal appearance the main pulmonary trunk without evidence of saddle embolus at the branch of the main pulmonary trunk. The pulmonary arteries and respective divisions are normal in caliber and distribution out to the tertiary branch vessels, however segment arteries at the level of the second and third order branches are somewhat limited by motion artifact and quantum adenoids.    There is a small to moderate amount of pericardial fluid encircling the cardiac chamber is evidence of fairly moderate concentric thickening of the left ventricular wall secondary to systemic hypertension on the long-standing basis. No significant backflow contrast into the suprahepatic inferior vena cava suggestive of underlying cardiac decompensation.    Small to moderate-sized sliding-type hiatal hernia is noted.    Parenchymal lung windows settings demonstrate no evidence of mass or abnormal there are geographic areas of ground attenuation within the apical segments attributed towards  atelectasis, pulmonary hemorrhage, and/or edema. Tracheal column appears midline without evidence of intraluminal occlusion.    The upper abdominal cavity is unremarkable in CT appearance. No evidence of adrenal enlargement.    IMPRESSION:  1. No CT evidence of acute pulmonary thromboembolism.  2. Moderate-sized pericardial effusion encircles the cardiac chambers similar in volume as compared to previous.  3. Geographic areas of prominence attenuation throughout the bilateral lung fields attributed towards the atelectasis, pneumonia, hemorrhage, or edema. The overall distribution appears unchanged upon comparison.    Electronically signed by:  Deandre Vitale MD  5/3/2023 4:16 PM CDT Workstation: [id number]                                     X-Ray Chest AP Portable (Final result)  Result time 05/03/23 10:05:54      Final result by Deandre Shirley MD (05/03/23 10:05:54)                   Narrative:    CLINICAL HISTORY:  33 years (1990) Female Chest Pain Vomiting; Chest Pain    TECHNIQUE:  Portable AP radiograph the chest.    COMPARISON:  None available.    FINDINGS:  The lungs are clear. No pneumothorax is identified. The cardiac silhouette is moderately enlarged. There is a right-sided IJ dual-lumen central venous catheter with distal tip at the level of the SVC. Osseous structures appear within normal limits. The visualized upper abdomen is unremarkable.    IMPRESSION:  No acute cardiac or pulmonary process.                  .            Electronically signed by:  Deandre Shirley MD  5/3/2023 10:05 AM CDT Workstation: [id number]                                     Medications   LORazepam injection 1 mg (1 mg Intravenous Given 5/3/23 0929)   hydrALAZINE injection 10 mg (10 mg Intravenous Given 5/3/23 1158)   ondansetron injection 4 mg (4 mg Intravenous Given 5/3/23 1159)   HYDROmorphone injection 0.5 mg (0.5 mg Intravenous Given 5/3/23 1159)   LORazepam injection 1 mg (1 mg Intravenous Given 5/3/23 4645)    iodixanoL (VISIPAQUE 320) injection 100 mL (100 mLs Intravenous Given 5/3/23 1666)     Medical Decision Making:   History:   Old Records Summarized: records from previous admission(s).  Initial Assessment:   33-year-old female presents emergency department via EMS for emergent evaluation of pain to her right port.  Patient has a past medical history of end-stage renal disease on hemodialysis Tuesday Thursday and Saturday.  Last dialysis was yesterday.  Patient reports that she had a recent port placed few days prior and [facility].  The patient reports that she did receive dialysis through her port without incident however today it is hurting.  The patient is thrashing around in bed complaining of pain now to her chest abdomen and port.  She is had no associated fevers nausea or vomiting    Differential Diagnosis:   Considerations include but not limited to, electrolyte abnormalities, PE, ACS, infected port  Clinical Tests:   Lab Tests: Ordered and Reviewed  Radiological Study: Ordered and Reviewed  Medical Tests: Ordered and Reviewed  ED Management:  33-year-old female with significant past medical history including end-stage renal disease currently on hemodialysis, Tuesday, Thursday, and Saturday., hypertension, diabetes, gastroparesis, chronic pain.  Patient states that she had a had placed her right anterior chest approximately 5 days ago in [facility] for dialysis she states since that time her port has been hurting.  She did have dialysis through the port yesterday successfully.  Patient has had no fevers.  On my exam the patient is complaining of pain to her report, chest, and abdomen she has pain out of proportion until she receives some type of narcotic medications consisting of either Ativan or Dilaudid, after those medications are dispensed to her she sleeps without any complaints and has normal behavior.  Once the medications wear off or the patient is awakened she wants again starts  thrashing in the bed complaining of these pains.  Patient has definite drug-seeking behavior however secondary to significant past medical history she had extensive workup in the emergency department today I have also reviewed her medical records and her presentation is consistent with most visits to the emergency department.  Patient also has multiple visits to the ER, and multiple inpatient stays the patient reported she did not taken her medications today I think she is very noncompliant with her medication regimen.  Patient's troponin is baseline for her, as well as her BNP she is no evidence of pleural effusion on x-ray imaging she does have effusion noted on CTA of the chest however stable and unchanged from previous.  CT imaging of the chest is otherwise unremarkable CT imaging of the abdomen reveals constipation patient will be discharged home with lactulose.  I have discussed this patient with my attending physician Dr. Kellogg  he was also personally evaluated the patient and reviewed CT imaging reports/ labs            ED Course as of 05/03/23 1727   Wed May 03, 2023   1152 Patient's labs are reassuring and baseline for patient.  I will administer hydralazine for hypertension, Dilaudid and Zofran for pain and reassess.  Troponin two his pending [MP]   1458 Patient still thrashing in bed she does cooperate when her behavior is redirected and can remains still she also does fall asleep and her vital signs normalized along with her blood pressure after she gets pain medications.  However patient continues to complain of pain near her port and had 1 episode of nonbloody, nonbilious emesis [MP]   1458 Therefore CT imaging will be performed [MP]      ED Course User Index  [MP] JACQUELINE Mcnulty                 Clinical Impression:   Final diagnoses:  [K59.00] Constipation, unspecified constipation type (Primary)  [R07.89] Atypical chest pain  [G89.29] Other chronic pain  [N18.6, Z99.2] ESRD (end stage renal  disease) on dialysis (Chronic)  [I10] Uncontrolled hypertension        ED Disposition Condition    Discharge Stable          ED Prescriptions       Medication Sig Dispense Start Date End Date Auth. Provider    lactulose (CHRONULAC) 20 gram/30 mL Soln Take 15 mLs (10 g total) by mouth 3 (three) times daily. for 5 days 225 mL 5/3/2023 5/8/2023 JACQUELINE Mcnulty          Follow-up Information       Follow up With Specialties Details Why Contact Info    Melony Kim NP Nurse Practitioner Schedule an appointment as soon as possible for a visit in 2 days  [address] [place] [id number]  [phone]               JACQUELINE Mcnulty  05/03/23 0119

## 2023-05-11 ENCOUNTER — HOSPITAL ENCOUNTER (OUTPATIENT)
Facility: HOSPITAL | Age: 33
Discharge: HOME | End: 2023-05-12
Attending: EMERGENCY MEDICINE | Admitting: FAMILY MEDICINE
Payer: MEDICAID

## 2023-05-11 DIAGNOSIS — D64.9 ANEMIA, UNSPECIFIED TYPE: ICD-10-CM

## 2023-05-11 DIAGNOSIS — D64.9 SYMPTOMATIC ANEMIA: ICD-10-CM

## 2023-05-11 DIAGNOSIS — N18.6 END-STAGE RENAL DISEASE NEEDING DIALYSIS: Primary | ICD-10-CM

## 2023-05-11 DIAGNOSIS — Z51.89 ENCOUNTER FOR BLOOD TRANSFUSION: ICD-10-CM

## 2023-05-11 DIAGNOSIS — R09.02 HYPOXIA: ICD-10-CM

## 2023-05-11 DIAGNOSIS — Z99.2 END-STAGE RENAL DISEASE NEEDING DIALYSIS: Primary | ICD-10-CM

## 2023-05-11 LAB
ABO + RH BLD: NORMAL
ALBUMIN SERPL BCP-MCNC: 3.4 G/DL (ref 3.5–5.2)
ALLENS TEST: ABNORMAL
ALP SERPL-CCNC: 196 U/L (ref 55–135)
ALT SERPL W/O P-5'-P-CCNC: 41 U/L (ref 10–44)
ANION GAP SERPL CALC-SCNC: 15 MMOL/L (ref 8–16)
AST SERPL-CCNC: 32 U/L (ref 10–40)
B-OH-BUTYR BLD STRIP-SCNC: 3.1 MMOL/L (ref 0–0.5)
BASOPHILS # BLD AUTO: 0.03 K/UL (ref 0–0.2)
BASOPHILS NFR BLD: 0.5 % (ref 0–1.9)
BILIRUB SERPL-MCNC: 2.1 MG/DL (ref 0.1–1)
BLD GP AB SCN CELLS X3 SERPL QL: NORMAL
BLD PROD TYP BPU: NORMAL
BLD PROD TYP BPU: NORMAL
BLOOD UNIT EXPIRATION DATE: NORMAL
BLOOD UNIT EXPIRATION DATE: NORMAL
BLOOD UNIT TYPE CODE: 6200
BLOOD UNIT TYPE CODE: 6200
BLOOD UNIT TYPE: NORMAL
BLOOD UNIT TYPE: NORMAL
BNP SERPL-MCNC: 1935 PG/ML (ref 0–99)
BUN SERPL-MCNC: 41 MG/DL (ref 6–20)
CALCIUM SERPL-MCNC: 8.2 MG/DL (ref 8.7–10.5)
CHLORIDE SERPL-SCNC: 92 MMOL/L (ref 95–110)
CO2 SERPL-SCNC: 26 MMOL/L (ref 23–29)
CODING SYSTEM: NORMAL
CODING SYSTEM: NORMAL
CREAT SERPL-MCNC: 6 MG/DL (ref 0.5–1.4)
CROSSMATCH INTERPRETATION: NORMAL
CROSSMATCH INTERPRETATION: NORMAL
DELSYS: ABNORMAL
DIFFERENTIAL METHOD: ABNORMAL
DISPENSE STATUS: NORMAL
DISPENSE STATUS: NORMAL
EOSINOPHIL # BLD AUTO: 0.1 K/UL (ref 0–0.5)
EOSINOPHIL NFR BLD: 1 % (ref 0–8)
ERYTHROCYTE [DISTWIDTH] IN BLOOD BY AUTOMATED COUNT: 17.7 % (ref 11.5–14.5)
EST. GFR  (NO RACE VARIABLE): 8.8 ML/MIN/1.73 M^2
GLUCOSE SERPL-MCNC: 188 MG/DL (ref 70–110)
GLUCOSE SERPL-MCNC: 362 MG/DL (ref 70–110)
GLUCOSE SERPL-MCNC: 366 MG/DL (ref 70–110)
HCG INTACT+B SERPL-ACNC: 1.2 MIU/ML
HCO3 UR-SCNC: 28.5 MMOL/L (ref 24–28)
HCT VFR BLD AUTO: 19.8 % (ref 37–48.5)
HCT VFR BLD AUTO: 34 % (ref 37–48.5)
HGB BLD-MCNC: 11.6 G/DL (ref 12–16)
HGB BLD-MCNC: 6.5 G/DL (ref 12–16)
IMM GRANULOCYTES # BLD AUTO: 0.03 K/UL (ref 0–0.04)
IMM GRANULOCYTES NFR BLD AUTO: 0.5 % (ref 0–0.5)
LIPASE SERPL-CCNC: 26 U/L (ref 4–60)
LYMPHOCYTES # BLD AUTO: 0.9 K/UL (ref 1–4.8)
LYMPHOCYTES NFR BLD: 15.1 % (ref 18–48)
MAGNESIUM SERPL-MCNC: 1.8 MG/DL (ref 1.6–2.6)
MCH RBC QN AUTO: 29.5 PG (ref 27–31)
MCHC RBC AUTO-ENTMCNC: 32.8 G/DL (ref 32–36)
MCV RBC AUTO: 90 FL (ref 82–98)
MODE: ABNORMAL
MONOCYTES # BLD AUTO: 0.5 K/UL (ref 0.3–1)
MONOCYTES NFR BLD: 8.1 % (ref 4–15)
NEUTROPHILS # BLD AUTO: 4.4 K/UL (ref 1.8–7.7)
NEUTROPHILS NFR BLD: 74.8 % (ref 38–73)
NRBC BLD-RTO: 2 /100 WBC
NUM UNITS TRANS PACKED RBC: NORMAL
NUM UNITS TRANS PACKED RBC: NORMAL
PCO2 BLDA: 47.5 MMHG (ref 35–45)
PH SMN: 7.39 [PH] (ref 7.35–7.45)
PHOSPHATE SERPL-MCNC: 5.6 MG/DL (ref 2.7–4.5)
PLATELET # BLD AUTO: 145 K/UL (ref 150–450)
PMV BLD AUTO: 10.7 FL (ref 9.2–12.9)
PO2 BLDA: 26 MMHG (ref 40–60)
POC BE: 4 MMOL/L
POC SATURATED O2: 47 % (ref 95–100)
POC TCO2: 30 MMOL/L (ref 24–29)
POTASSIUM SERPL-SCNC: 4.3 MMOL/L (ref 3.5–5.1)
PROT SERPL-MCNC: 7 G/DL (ref 6–8.4)
RBC # BLD AUTO: 2.2 M/UL (ref 4–5.4)
SAMPLE: ABNORMAL
SARS-COV-2 RDRP RESP QL NAA+PROBE: NEGATIVE
SITE: ABNORMAL
SODIUM SERPL-SCNC: 133 MMOL/L (ref 136–145)
SPECIMEN OUTDATE: NORMAL
TROPONIN I SERPL HS-MCNC: 32.2 PG/ML (ref 0–14.9)
WBC # BLD AUTO: 5.9 K/UL (ref 3.9–12.7)

## 2023-05-11 PROCEDURE — 96374 THER/PROPH/DIAG INJ IV PUSH: CPT

## 2023-05-11 PROCEDURE — 21400001 HC TELEMETRY ROOM

## 2023-05-11 PROCEDURE — G0378 HOSPITAL OBSERVATION PER HR: HCPCS

## 2023-05-11 PROCEDURE — 84100 ASSAY OF PHOSPHORUS: CPT | Performed by: NURSE PRACTITIONER

## 2023-05-11 PROCEDURE — 96376 TX/PRO/DX INJ SAME DRUG ADON: CPT

## 2023-05-11 PROCEDURE — 85014 HEMATOCRIT: CPT | Performed by: NURSE PRACTITIONER

## 2023-05-11 PROCEDURE — 83880 ASSAY OF NATRIURETIC PEPTIDE: CPT | Performed by: NURSE PRACTITIONER

## 2023-05-11 PROCEDURE — 82962 GLUCOSE BLOOD TEST: CPT

## 2023-05-11 PROCEDURE — 80053 COMPREHEN METABOLIC PANEL: CPT | Performed by: NURSE PRACTITIONER

## 2023-05-11 PROCEDURE — 96372 THER/PROPH/DIAG INJ SC/IM: CPT | Mod: 59 | Performed by: NURSE PRACTITIONER

## 2023-05-11 PROCEDURE — 86920 COMPATIBILITY TEST SPIN: CPT | Performed by: NURSE PRACTITIONER

## 2023-05-11 PROCEDURE — 99900035 HC TECH TIME PER 15 MIN (STAT)

## 2023-05-11 PROCEDURE — 99900031 HC PATIENT EDUCATION (STAT)

## 2023-05-11 PROCEDURE — 85025 COMPLETE CBC W/AUTO DIFF WBC: CPT | Performed by: NURSE PRACTITIONER

## 2023-05-11 PROCEDURE — 63600175 PHARM REV CODE 636 W HCPCS: Performed by: HOSPITALIST

## 2023-05-11 PROCEDURE — 83690 ASSAY OF LIPASE: CPT | Performed by: NURSE PRACTITIONER

## 2023-05-11 PROCEDURE — 83735 ASSAY OF MAGNESIUM: CPT | Performed by: NURSE PRACTITIONER

## 2023-05-11 PROCEDURE — 84484 ASSAY OF TROPONIN QUANT: CPT | Performed by: NURSE PRACTITIONER

## 2023-05-11 PROCEDURE — 82803 BLOOD GASES ANY COMBINATION: CPT

## 2023-05-11 PROCEDURE — 63600175 PHARM REV CODE 636 W HCPCS: Performed by: NURSE PRACTITIONER

## 2023-05-11 PROCEDURE — 90935 HEMODIALYSIS ONE EVALUATION: CPT

## 2023-05-11 PROCEDURE — 25000003 PHARM REV CODE 250: Performed by: NURSE PRACTITIONER

## 2023-05-11 PROCEDURE — 36430 TRANSFUSION BLD/BLD COMPNT: CPT

## 2023-05-11 PROCEDURE — 85018 HEMOGLOBIN: CPT | Performed by: NURSE PRACTITIONER

## 2023-05-11 PROCEDURE — 36415 COLL VENOUS BLD VENIPUNCTURE: CPT | Performed by: NURSE PRACTITIONER

## 2023-05-11 PROCEDURE — 82010 KETONE BODYS QUAN: CPT | Performed by: NURSE PRACTITIONER

## 2023-05-11 PROCEDURE — U0002 COVID-19 LAB TEST NON-CDC: HCPCS | Performed by: NURSE PRACTITIONER

## 2023-05-11 PROCEDURE — 86900 BLOOD TYPING SEROLOGIC ABO: CPT | Performed by: NURSE PRACTITIONER

## 2023-05-11 PROCEDURE — P9016 RBC LEUKOCYTES REDUCED: HCPCS | Performed by: NURSE PRACTITIONER

## 2023-05-11 PROCEDURE — 84702 CHORIONIC GONADOTROPIN TEST: CPT | Performed by: NURSE PRACTITIONER

## 2023-05-11 PROCEDURE — 96375 TX/PRO/DX INJ NEW DRUG ADDON: CPT | Mod: 59

## 2023-05-11 PROCEDURE — 99285 EMERGENCY DEPT VISIT HI MDM: CPT | Mod: 25

## 2023-05-11 RX ORDER — MORPHINE SULFATE 2 MG/ML
2 INJECTION, SOLUTION INTRAMUSCULAR; INTRAVENOUS ONCE
Status: COMPLETED | OUTPATIENT
Start: 2023-05-11 | End: 2023-05-11

## 2023-05-11 RX ORDER — ONDANSETRON 2 MG/ML
4 INJECTION INTRAMUSCULAR; INTRAVENOUS EVERY 8 HOURS PRN
Status: DISCONTINUED | OUTPATIENT
Start: 2023-05-11 | End: 2023-05-12 | Stop reason: HOSPADM

## 2023-05-11 RX ORDER — AMITRIPTYLINE HYDROCHLORIDE 50 MG/1
50 TABLET, FILM COATED ORAL NIGHTLY
Status: ON HOLD | COMMUNITY
End: 2023-05-17 | Stop reason: HOSPADM

## 2023-05-11 RX ORDER — FLUOXETINE HYDROCHLORIDE 20 MG/1
20 CAPSULE ORAL DAILY
Status: DISCONTINUED | OUTPATIENT
Start: 2023-05-11 | End: 2023-05-12 | Stop reason: HOSPADM

## 2023-05-11 RX ORDER — GABAPENTIN 100 MG/1
100 CAPSULE ORAL 3 TIMES DAILY
Status: DISCONTINUED | OUTPATIENT
Start: 2023-05-11 | End: 2023-05-12 | Stop reason: HOSPADM

## 2023-05-11 RX ORDER — SUCRALFATE 1 G/10ML
1 SUSPENSION ORAL
Status: DISCONTINUED | OUTPATIENT
Start: 2023-05-11 | End: 2023-05-12 | Stop reason: HOSPADM

## 2023-05-11 RX ORDER — LEVETIRACETAM 500 MG/1
500 TABLET ORAL 2 TIMES DAILY
Status: DISCONTINUED | OUTPATIENT
Start: 2023-05-11 | End: 2023-05-12 | Stop reason: HOSPADM

## 2023-05-11 RX ORDER — ACETAMINOPHEN 325 MG/1
650 TABLET ORAL EVERY 8 HOURS PRN
Status: DISCONTINUED | OUTPATIENT
Start: 2023-05-11 | End: 2023-05-12 | Stop reason: HOSPADM

## 2023-05-11 RX ORDER — SODIUM CHLORIDE 9 MG/ML
INJECTION, SOLUTION INTRAVENOUS ONCE
Status: CANCELLED | OUTPATIENT
Start: 2023-05-11 | End: 2023-05-11

## 2023-05-11 RX ORDER — IBUPROFEN 200 MG
16 TABLET ORAL
Status: DISCONTINUED | OUTPATIENT
Start: 2023-05-11 | End: 2023-05-12 | Stop reason: HOSPADM

## 2023-05-11 RX ORDER — PANTOPRAZOLE SODIUM 40 MG/1
40 TABLET, DELAYED RELEASE ORAL
Status: DISCONTINUED | OUTPATIENT
Start: 2023-05-12 | End: 2023-05-12 | Stop reason: HOSPADM

## 2023-05-11 RX ORDER — GLUCAGON 1 MG
1 KIT INJECTION
Status: DISCONTINUED | OUTPATIENT
Start: 2023-05-11 | End: 2023-05-12 | Stop reason: HOSPADM

## 2023-05-11 RX ORDER — AMITRIPTYLINE HYDROCHLORIDE 25 MG/1
50 TABLET, FILM COATED ORAL NIGHTLY
Status: DISCONTINUED | OUTPATIENT
Start: 2023-05-11 | End: 2023-05-12 | Stop reason: HOSPADM

## 2023-05-11 RX ORDER — ONDANSETRON 2 MG/ML
4 INJECTION INTRAMUSCULAR; INTRAVENOUS
Status: COMPLETED | OUTPATIENT
Start: 2023-05-11 | End: 2023-05-11

## 2023-05-11 RX ORDER — MORPHINE SULFATE 4 MG/ML
4 INJECTION, SOLUTION INTRAMUSCULAR; INTRAVENOUS
Status: COMPLETED | OUTPATIENT
Start: 2023-05-11 | End: 2023-05-11

## 2023-05-11 RX ORDER — CARVEDILOL 25 MG/1
25 TABLET ORAL 2 TIMES DAILY
Status: DISCONTINUED | OUTPATIENT
Start: 2023-05-11 | End: 2023-05-12 | Stop reason: HOSPADM

## 2023-05-11 RX ORDER — IBUPROFEN 200 MG
24 TABLET ORAL
Status: DISCONTINUED | OUTPATIENT
Start: 2023-05-11 | End: 2023-05-12 | Stop reason: HOSPADM

## 2023-05-11 RX ORDER — HYDRALAZINE HYDROCHLORIDE 25 MG/1
100 TABLET, FILM COATED ORAL EVERY 8 HOURS
Status: DISCONTINUED | OUTPATIENT
Start: 2023-05-11 | End: 2023-05-12 | Stop reason: HOSPADM

## 2023-05-11 RX ORDER — SODIUM CHLORIDE 0.9 % (FLUSH) 0.9 %
10 SYRINGE (ML) INJECTION
Status: DISCONTINUED | OUTPATIENT
Start: 2023-05-11 | End: 2023-05-12 | Stop reason: HOSPADM

## 2023-05-11 RX ORDER — AMLODIPINE BESYLATE 5 MG/1
10 TABLET ORAL DAILY
Status: DISCONTINUED | OUTPATIENT
Start: 2023-05-12 | End: 2023-05-12 | Stop reason: HOSPADM

## 2023-05-11 RX ORDER — DICYCLOMINE HYDROCHLORIDE 10 MG/1
10 CAPSULE ORAL 4 TIMES DAILY PRN
Status: DISCONTINUED | OUTPATIENT
Start: 2023-05-11 | End: 2023-05-12 | Stop reason: HOSPADM

## 2023-05-11 RX ORDER — HYDROCODONE BITARTRATE AND ACETAMINOPHEN 500; 5 MG/1; MG/1
TABLET ORAL
Status: DISCONTINUED | OUTPATIENT
Start: 2023-05-11 | End: 2023-05-12 | Stop reason: HOSPADM

## 2023-05-11 RX ORDER — MUPIROCIN 20 MG/G
OINTMENT TOPICAL 2 TIMES DAILY
Status: DISCONTINUED | OUTPATIENT
Start: 2023-05-11 | End: 2023-05-12 | Stop reason: HOSPADM

## 2023-05-11 RX ORDER — HYDROCODONE BITARTRATE AND ACETAMINOPHEN 500; 5 MG/1; MG/1
TABLET ORAL
Status: DISCONTINUED | OUTPATIENT
Start: 2023-05-11 | End: 2023-05-11

## 2023-05-11 RX ORDER — TRAMADOL HYDROCHLORIDE 50 MG/1
50 TABLET ORAL 2 TIMES DAILY PRN
Status: DISCONTINUED | OUTPATIENT
Start: 2023-05-11 | End: 2023-05-12 | Stop reason: HOSPADM

## 2023-05-11 RX ORDER — TALC
6 POWDER (GRAM) TOPICAL NIGHTLY PRN
Status: DISCONTINUED | OUTPATIENT
Start: 2023-05-11 | End: 2023-05-12 | Stop reason: HOSPADM

## 2023-05-11 RX ADMIN — MORPHINE SULFATE 4 MG: 4 INJECTION, SOLUTION INTRAMUSCULAR; INTRAVENOUS at 12:05

## 2023-05-11 RX ADMIN — GABAPENTIN 100 MG: 100 CAPSULE ORAL at 08:05

## 2023-05-11 RX ADMIN — LEVETIRACETAM 500 MG: 500 TABLET, FILM COATED ORAL at 08:05

## 2023-05-11 RX ADMIN — AMITRIPTYLINE HYDROCHLORIDE 50 MG: 25 TABLET, FILM COATED ORAL at 08:05

## 2023-05-11 RX ADMIN — CARVEDILOL 25 MG: 25 TABLET, FILM COATED ORAL at 08:05

## 2023-05-11 RX ADMIN — ONDANSETRON 4 MG: 2 INJECTION INTRAMUSCULAR; INTRAVENOUS at 12:05

## 2023-05-11 RX ADMIN — TRAMADOL HYDROCHLORIDE 50 MG: 50 TABLET, COATED ORAL at 08:05

## 2023-05-11 RX ADMIN — HYDRALAZINE HYDROCHLORIDE 100 MG: 25 TABLET, FILM COATED ORAL at 09:05

## 2023-05-11 RX ADMIN — SUCRALFATE 1 G: 1 SUSPENSION ORAL at 08:05

## 2023-05-11 RX ADMIN — Medication 6 MG: at 08:05

## 2023-05-11 RX ADMIN — INSULIN DETEMIR 6 UNITS: 100 INJECTION, SOLUTION SUBCUTANEOUS at 10:05

## 2023-05-11 RX ADMIN — MUPIROCIN 1 G: 20 OINTMENT TOPICAL at 08:05

## 2023-05-11 RX ADMIN — MORPHINE SULFATE 2 MG: 2 INJECTION, SOLUTION INTRAMUSCULAR; INTRAVENOUS at 09:05

## 2023-05-11 NOTE — PROGRESS NOTES
HD tx tolerated   2 units PRBC administered  2.5L net UF  Tunneled dialysis catheter dressing changed       05/11/23 1840   Handoff Report   Received From Stephanie Mena   Vital Signs   Temp 97.5 °F (36.4 °C)   Temp Source Axillary   Pulse 89   Resp 18   SpO2 100 %   Device (Oxygen Therapy) room air   BP (!) 197/91   Assessments (Pre/Post)   Blood Liters Processed (BLP) 66   Transport Modality stretcher   Level of Consciousness (AVPU) alert   Dialyzer Clearance moderately streaked   Post-Hemodialysis Assessment   Rinseback Volume (mL) 250 mL   Blood Volume Processed (Liters) 66 L   Dialyzer Clearance Moderately streaked   Duration of Treatment 180 minutes   Additional Fluid Intake (mL) 500 mL   Total UF (mL) 3170 mL   Net Fluid Removal 2500   Patient Response to Treatment Tolerated   Post-Treatment Weight   (ANTOINETTE, pt on stretcher, no scale present)   Post-Hemodialysis Comments Tx complete, all blood reinfused per protocol

## 2023-05-11 NOTE — PHARMACY MED REC
"  Admission Medication History     The home medication history was taken by Sherita Miller.    You may go to "Admission" then "Reconcile Home Medications" tabs to review and/or act upon these items.     The home medication list has been updated by the Pharmacy department.   Please read ALL comments highlighted in yellow.   Please address this information as you see fit.    Feel free to contact us if you have any questions or require assistance.        Medications listed below were obtained from: Patient/family  No current facility-administered medications on file prior to encounter.     Current Outpatient Medications on File Prior to Encounter   Medication Sig Dispense Refill    amitriptyline (ELAVIL) 50 MG tablet Take 50 mg by mouth every evening.      amLODIPine (NORVASC) 5 MG tablet Take 2 tablets (10 mg total) by mouth once daily. 30 tablet 0    apixaban (ELIQUIS) 5 mg Tab Take 1 tablet (5 mg total) by mouth 2 (two) times daily. 60 tablet 2    carvediloL (COREG) 25 MG tablet Take 1 tablet twice a day by oral route for 30 days. (Patient taking differently: Take 25 mg by mouth 2 (two) times daily.) 60 tablet 2    FLUoxetine 20 MG capsule Take 1 capsule every day by oral route for 30 days. (Patient taking differently: Take 20 mg by mouth once daily.) 30 capsule 2    gabapentin (NEURONTIN) 100 MG capsule Take 1 capsule by mouth 3 times daily (Patient taking differently: Take 100 mg by mouth 3 (three) times daily.) 90 capsule 2    hydrALAZINE (APRESOLINE) 100 MG tablet Take 1 tablet (100 mg total) by mouth every 8 (eight) hours. 60 tablet 0    insulin aspart U-100 (NOVOLOG) 100 unit/mL (3 mL) InPn pen Inject 4 Units into the skin 3 (three) times daily with meals. 15 mL 3    insulin detemir U-100, Levemir, 100 unit/mL (3 mL) SubQ InPn pen Inject 6 Units into the skin 2 (two) times daily. 15 mL 3    isosorbide mononitrate (IMDUR) 30 MG 24 hr tablet Take 3 tablets (90 mg total) by mouth once daily. 90 tablet 1    " "levETIRAcetam (KEPPRA) 500 MG Tab Take 1 tablet twice a day by oral route for 30 days. (Patient taking differently: Take 500 mg by mouth 2 (two) times daily.) 60 tablet 2    pantoprazole (PROTONIX) 40 MG tablet Take 1 tablet every day by oral route for 30 days. (Patient taking differently: Take 40 mg by mouth once daily.) 30 tablet 2    sucralfate (CARAFATE) 100 mg/mL suspension Take 10 mLs (1 g total) by mouth 4 (four) times daily before meals and nightly. 1200 mL 0    dicyclomine (BENTYL) 10 MG capsule Take 1 capsule (10 mg total) by mouth 4 (four) times daily as needed (abdominal pain). 20 capsule 0    ondansetron (ZOFRAN-ODT) 4 MG TbDL Take 1 tablet (4 mg total) by mouth every 6 (six) hours as needed. 20 tablet 0    pen needle, diabetic 31 gauge x 3/16" Ndle Use as directed to inject insulin 5 times daily 100 each 11    [DISCONTINUED] albuterol (PROVENTIL/VENTOLIN HFA) 90 mcg/actuation inhaler Inhale 2 puffs into the lungs every 6 (six) hours as needed for Wheezing. Rescue 18 g 3    [DISCONTINUED] amitriptyline (ELAVIL) 50 MG tablet Take 1 tablet (50 mg total) by mouth every evening. 30 tablet 6    [DISCONTINUED] atenoloL (TENORMIN) 50 MG tablet Take 1 tablet (50 mg total) by mouth every other day. 45 tablet 3    [DISCONTINUED] omeprazole (PRILOSEC) 20 MG capsule Take 2 capsules (40 mg total) by mouth once daily. for 10 days 20 capsule 0         Sherita Miller  [id number]               .        "

## 2023-05-11 NOTE — H&P
[facility] Medicine History & Physical Examination   Patient Name: Tabby Howard  MRN: [id number]  Patient Class: Emergency   Admission Date: 5/11/2023 11:26 AM  Length of Stay: 0  Attending Physician:   Primary Care Provider: AIDEN CONNER NP  Face-to-Face encounter date: 05/11/2023  Code Status:Full Code  MPOA:  Chief Complaint: Abdominal Pain ([place]), Nausea, and Vomiting        Patient information was obtained from patient, past medical records and ER records.   HISTORY OF PRESENT ILLNESS:   Tabby Howard is a 33 y.o. old female who  has a past medical history of ESRD on hemodialysis (04/12/2022), Gastritis (12/2022), Gastroparesis (04/12/2022), Heart failure with preserved ejection fraction (04/12/2022), History of supraventricular tachycardia, Hyperkalemia (07/07/2022), Hypertensive emergency (07/08/2022), Sickle cell trait (04/12/2022), and Type 2 diabetes mellitus.. The patient presented to [facility] on 5/11/2023 with a primary complaint of Abdominal Pain ([place]), Nausea, and Vomiting  .     33  year old  female presents to the emergency room with complaint of weakness cyst dizziness and fatigue for the past week    Past medical history significant for end-stage renal disease requiring hemodialysis 3 times a week on Tuesdays Thursdays and Saturdays, gastroparesis, upper extremity DVT anticoagulated with Eliquis chronic stage III heart diastolic heart failure, poorly-controlled hypertension, type 2 diabetes, sickle cell trait    The patient states her last hemodialysis session was Tuesday she did not dialyze today.  According to the records the patient was feeling weak and dizzy and did not show to the dialysis unit.  The dialysis unit attempted to call a patient on multiple times and was able to contact a family member who instructed her to go to the emergency room for further evaluation    In the emergency room multiple  diagnostic tests were performed.  The patient was found to have a hemoglobin of 56.5  She also had elevated BNP of 1800 her electrolytes was stable.    Nephrology was consulted and agreed to dialyze the patient emergently.  They will transfuse 2 units of packed red cells with hemodialysis    We will trend the patient's H&H check stool for occult blood hold Eliquis for now trend H&H  REVIEW OF SYSTEMS:   10 Point Review of System was performed and was found to be negative except for that mentioned already in the HPI and   Review of Systems (Negative unless checked off)  Review of Systems   Constitutional:  Positive for malaise/fatigue.   HENT: Negative.     Eyes: Negative.    Respiratory:  Positive for shortness of breath.    Cardiovascular: Negative.    Gastrointestinal:  Positive for nausea.   Genitourinary: Negative.    Musculoskeletal: Negative.    Skin: Negative.    Neurological:  Positive for dizziness and weakness.   Endo/Heme/Allergies: Negative.    Psychiatric/Behavioral: Negative.           PAST MEDICAL HISTORY:     Past Medical History:   Diagnosis Date    ESRD on hemodialysis 2022    Gastritis 2022    EGD was 21    Gastroparesis 2022    has not had Emptying study    Heart failure with preserved ejection fraction 2022    EF 55% on 3/22    History of supraventricular tachycardia     Hyperkalemia 2022    Hypertensive emergency 2022    Sickle cell trait 2022    Type 2 diabetes mellitus        PAST SURGICAL HISTORY:     Past Surgical History:   Procedure Laterality Date     SECTION      x 3    COLONOSCOPY      COLONOSCOPY N/A 2022    Procedure: COLONOSCOPY;  Surgeon: Jagdeep Cedeno MD;  Location: [facility];  Service: Endoscopy;  Laterality: N/A;    ESOPHAGOGASTRODUODENOSCOPY N/A 10/18/2019    Procedure: ESOPHAGOGASTRODUODENOSCOPY (EGD);  Surgeon: Gianluca Mendez MD;  Location: [facility];  Service: Endoscopy;  Laterality: N/A;     ESOPHAGOGASTRODUODENOSCOPY N/A 08/24/2022    Procedure: EGD (ESOPHAGOGASTRODUODENOSCOPY);  Surgeon: Micky Paredes III, MD;  Location: [facility] ENDO;  Service: Endoscopy;  Laterality: N/A;    ESOPHAGOGASTRODUODENOSCOPY N/A 12/5/2022    Procedure: EGD (ESOPHAGOGASTRODUODENOSCOPY);  Surgeon: Marcelo Zhong MD;  Location: [facility] ENDO;  Service: Endoscopy;  Laterality: N/A;    LAPAROSCOPIC CHOLECYSTECTOMY N/A 07/30/2022    Procedure: CHOLECYSTECTOMY, LAPAROSCOPIC;  Surgeon: Grey Perez MD;  Location: [facility] OR;  Service: General;  Laterality: N/A;    PLACEMENT OF DUAL-LUMEN VASCULAR CATHETER Left 07/12/2022    Procedure: INSERTION-CATHETER-JOSEPH;  Surgeon: Dionte Gan MD;  Location: [facility] OR;  Service: General;  Laterality: Left;    PLACEMENT OF DUAL-LUMEN VASCULAR CATHETER Right 07/26/2022    Procedure: INSERTION-CATHETER-Hemosplit;  Surgeon: Dionte Gan MD;  Location: [facility] OR;  Service: General;  Laterality: Right;       ALLERGIES:   Penicillins    FAMILY HISTORY:     Family History   Problem Relation Age of Onset    Diabetes Mother     Diabetes Father        SOCIAL HISTORY:     Social History     Tobacco Use    Smoking status: Never    Smokeless tobacco: Never   Substance Use Topics    Alcohol use: Not Currently        Social History     Substance and Sexual Activity   Sexual Activity Not Currently    Partners: Male    Birth control/protection: I.U.D.        HOME MEDICATIONS:     Prior to Admission medications    Medication Sig Start Date End Date Taking? Authorizing Provider   amitriptyline (ELAVIL) 50 MG tablet Take 50 mg by mouth every evening.   Yes Historical Provider   amLODIPine (NORVASC) 5 MG tablet Take 2 tablets (10 mg total) by mouth once daily. 4/23/23 4/22/24 Yes Linette Yepez NP   apixaban (ELIQUIS) 5 mg Tab Take 1 tablet (5 mg total) by mouth 2 (two) times daily. 2/22/23  Yes    carvediloL (COREG) 25 MG tablet Take 1 tablet twice a day by oral route for 30 days.  Patient taking  "differently: Take 25 mg by mouth 2 (two) times daily. 2/22/23  Yes    FLUoxetine 20 MG capsule Take 1 capsule every day by oral route for 30 days.  Patient taking differently: Take 20 mg by mouth once daily. 2/22/23  Yes    gabapentin (NEURONTIN) 100 MG capsule Take 1 capsule by mouth 3 times daily  Patient taking differently: Take 100 mg by mouth 3 (three) times daily. 2/22/23  Yes    hydrALAZINE (APRESOLINE) 100 MG tablet Take 1 tablet (100 mg total) by mouth every 8 (eight) hours. 4/24/23  Yes Emily Pemberton NP   insulin aspart U-100 (NOVOLOG) 100 unit/mL (3 mL) InPn pen Inject 4 Units into the skin 3 (three) times daily with meals. 4/14/23 4/13/24 Yes Violette Winslow MD   insulin detemir U-100, Levemir, 100 unit/mL (3 mL) SubQ InPn pen Inject 6 Units into the skin 2 (two) times daily. 4/14/23 4/13/24 Yes Violette Winslow MD   isosorbide mononitrate (IMDUR) 30 MG 24 hr tablet Take 3 tablets (90 mg total) by mouth once daily. 4/25/23  Yes Emily Pemberton NP   levETIRAcetam (KEPPRA) 500 MG Tab Take 1 tablet twice a day by oral route for 30 days.  Patient taking differently: Take 500 mg by mouth 2 (two) times daily. 2/22/23  Yes    pantoprazole (PROTONIX) 40 MG tablet Take 1 tablet every day by oral route for 30 days.  Patient taking differently: Take 40 mg by mouth once daily. 2/22/23  Yes    sucralfate (CARAFATE) 100 mg/mL suspension Take 10 mLs (1 g total) by mouth 4 (four) times daily before meals and nightly. 4/14/23 5/14/23 Yes Violette Winslow MD   dicyclomine (BENTYL) 10 MG capsule Take 1 capsule (10 mg total) by mouth 4 (four) times daily as needed (abdominal pain). 4/14/23 5/14/23  Violette Winslow MD   ondansetron (ZOFRAN-ODT) 4 MG TbDL Take 1 tablet (4 mg total) by mouth every 6 (six) hours as needed. 4/9/23   Gianluca Parra Jr., MD   pen needle, diabetic 31 gauge x 3/16" Ndle Use as directed to inject insulin 5 times daily 4/14/23   Violette Winslow MD   albuterol (PROVENTIL/VENTOLIN HFA) 90 mcg/actuation " "inhaler Inhale 2 puffs into the lungs every 6 (six) hours as needed for Wheezing. Rescue 11/15/22 5/11/23  Светлана Fiore MD   amitriptyline (ELAVIL) 50 MG tablet Take 1 tablet (50 mg total) by mouth every evening. 4/14/23 5/11/23  Violette Winslow MD   atenoloL (TENORMIN) 50 MG tablet Take 1 tablet (50 mg total) by mouth every other day. 7/17/22 7/27/22  Keegan Cody MD   omeprazole (PRILOSEC) 20 MG capsule Take 2 capsules (40 mg total) by mouth once daily. for 10 days 8/9/22 8/26/22  Kaushik Patton MD         PHYSICAL EXAM:   BP (!) 176/107   Pulse 89   Temp 98.9 °F (37.2 °C) (Oral)   Resp 18   Ht 5' 2" (1.575 m)   Wt 54 kg (119 lb)   LMP  (LMP Unknown)   SpO2 95%   BMI 21.77 kg/m²   Vitals Reviewed  General appearance:  Cachectic fatigued and chronically ill-appearing female in no apparent distress.  Skin: No Rash.   Neuro: Motor and sensory exams grossly intact. Good tone. Power in all 4 extremities 5/5.   HENT: Atraumatic head. Moist mucous membranes of oral cavity.  Eyes: Normal extraocular movements.   Neck: Supple. No evidence of lymphadenopathy. No thyroidomegaly.  Lungs: Clear to auscultation bilaterally. No wheezing present.   Heart: Regular rate and rhythm. S1 and S2 present with positive murmurs/no gallop/no rub. No pedal edema. No JVD present.   Abdomen: Soft, non-distended, non-tender. No rebound tenderness/guarding. No masses or organomegaly. Bowel sounds are normal. Bladder is not palpable.   Extremities: No cyanosis, clubbing, or edema.  Psych/mental status: Alert and oriented. Cooperative. Responds appropriately to questions.   EMERGENCY DEPARTMENT LABS AND IMAGING:   Following labs were Reviewed   Recent Labs   Lab 05/11/23  1232   WBC 5.90   HGB 6.5*   HCT 19.8*   *   CALCIUM 8.2*   ALBUMIN 3.4*   PROT 7.0   *   K 4.3   CO2 26   CL 92*   BUN 41*   CREATININE 6.0*   ALKPHOS 196*   ALT 41   AST 32   BILITOT 2.1*         BMP:   Recent Labs   Lab 05/11/23  1232 "   *   *   K 4.3   CL 92*   CO2 26   BUN 41*   CREATININE 6.0*   CALCIUM 8.2*   MG 1.8   , CMP   Recent Labs   Lab 05/11/23  1232   *   K 4.3   CL 92*   CO2 26   *   BUN 41*   CREATININE 6.0*   CALCIUM 8.2*   PROT 7.0   ALBUMIN 3.4*   BILITOT 2.1*   ALKPHOS 196*   AST 32   ALT 41   ANIONGAP 15   , CBC   Recent Labs   Lab 05/11/23  1232   WBC 5.90   HGB 6.5*   HCT 19.8*   *   , INR   Lab Results   Component Value Date    INR 1.1 03/20/2023    INR 1.0 01/12/2023    INR 1.0 10/26/2022   , Lipid Panel No results found for: CHOL, HDL, LDLCALC, TRIG, CHOLHDL, Troponin No results for input(s): TROPONINI in the last 168 hours., A1C:   Recent Labs   Lab 12/23/22  1413 03/03/23  0547   HGBA1C 7.5* 5.8   , and All labs within the past 24 hours have been reviewed  Microbiology Results (last 7 days)       ** No results found for the last 168 hours. **          No orders to display         X-Ray Chest AP Portable    Result Date: 5/11/2023  Chest single view CLINICAL DATA: Hypoxia, abdominal pain FINDINGS: Comparison to May 3. Cardiomegaly is stable. The mediastinum is unremarkable. Dual lead right IJ central catheter is unchanged in position. The lungs are clear. No osseous abnormalities are identified. IMPRESSION: 1. No acute process. 2. Stable cardiomegaly. Electronically signed by:  Zaid Wilson MD  5/11/2023 2:48 PM CDT Workstation: [id number]        Echo    Result Date: 4/23/2023  · The left ventricle is normal in size with moderate concentric hypertrophy and low normal systolic function. · The estimated ejection fraction is 50%. · Normal right ventricular size with normal right ventricular systolic function. · Intermediate central venous pressure (8 mmHg). · Small circumferential pericardial effusion. Effusion is fluid. · Patient has small-to-moderate pericardial effusion anteriorly subcostally it is approximally 1.7 cm · There is no evidence of constriction or tamponade · No  ventricular interdependence · Posterior fusion smaller than the anterior      I personally reviewed and agree with the radiologist's findings    12  lead EKG reveals a normal sinus rhythm with left axis deviation this poor R-wave progression this QTC prolongation with a QTC greater than 500 at 5:16 a.m. this no significant ST or T-wave abnormalities there is LVH by voltage rate was 100  ASSESSMENT & PLAN:   Tabby Howard is a 33 y.o. female admitted for    1. Symptomatic anemia  - Hg on admit 6.5  -transfuse 2 units of packed red  Cells with hemodialysis-  Trend H&H  -stool for occult blood  -IV PPI      2. End-stage renal disease with hypervolemia  -nephrology consulted will be dialyzed patient emergently  -avoid nephrotoxic medications  -renal dose all medications    3. Poorly-controlled hypertension  -continue home medications to manage  -p.r.n. hydralazine for systolic blood pressure 180 or greater    4. Anemia/multifactorial  -will do stool occult blood    5. Acute on chronic diastolic heart failure  -hemodialysis today  -continue Entresto  -cardiology consult for chest pain    6. History of pericardial effusion  -no friction rubs or gallops  -continue to monitor closely  -last echo was a month ago    7. Cachexia  -nutritional consult    8. QTC prolongation  -QTC greater than 500  -cardiology consult  -hold medications that could be prolonging QTC for now    9. Seizure disorder  -seizure precautions  -continue Keppra    DVT Prophylaxis: will be placed on SCDs for DVT prophylaxis and will be advised to be as mobile as possible and sit in a chair as tolerated.   ________________________________________________________________  Face-to-Face encounter date: 05/11/2023  Encounter included review of the medical records, interviewing and examining the patient face-to-face, discussion with family and other health care providers including emergency medicine physician, admission orders, interpreting lab/test results and  formulating a plan of care.   Medical Decision Making during this encounter was  [_] Low Complexity  [_] Moderate Complexity  [x] High Complexity  _________________________________________________________________________________    INPATIENT LIST OF MEDICATIONS     Current Facility-Administered Medications:     0.9%  NaCl infusion (for blood administration), , Intravenous, Q24H PRN, Geeta Kaur NP    0.9%  NaCl infusion (for blood administration), , Intravenous, Q24H PRN, Alejandra Lemon NP    Current Outpatient Medications:     amitriptyline (ELAVIL) 50 MG tablet, Take 50 mg by mouth every evening., Disp: , Rfl:     amLODIPine (NORVASC) 5 MG tablet, Take 2 tablets (10 mg total) by mouth once daily., Disp: 30 tablet, Rfl: 0    apixaban (ELIQUIS) 5 mg Tab, Take 1 tablet (5 mg total) by mouth 2 (two) times daily., Disp: 60 tablet, Rfl: 2    carvediloL (COREG) 25 MG tablet, Take 1 tablet twice a day by oral route for 30 days. (Patient taking differently: Take 25 mg by mouth 2 (two) times daily.), Disp: 60 tablet, Rfl: 2    FLUoxetine 20 MG capsule, Take 1 capsule every day by oral route for 30 days. (Patient taking differently: Take 20 mg by mouth once daily.), Disp: 30 capsule, Rfl: 2    gabapentin (NEURONTIN) 100 MG capsule, Take 1 capsule by mouth 3 times daily (Patient taking differently: Take 100 mg by mouth 3 (three) times daily.), Disp: 90 capsule, Rfl: 2    hydrALAZINE (APRESOLINE) 100 MG tablet, Take 1 tablet (100 mg total) by mouth every 8 (eight) hours., Disp: 60 tablet, Rfl: 0    insulin aspart U-100 (NOVOLOG) 100 unit/mL (3 mL) InPn pen, Inject 4 Units into the skin 3 (three) times daily with meals., Disp: 15 mL, Rfl: 3    insulin detemir U-100, Levemir, 100 unit/mL (3 mL) SubQ InPn pen, Inject 6 Units into the skin 2 (two) times daily., Disp: 15 mL, Rfl: 3    isosorbide mononitrate (IMDUR) 30 MG 24 hr tablet, Take 3 tablets (90 mg total) by mouth once daily., Disp: 90 tablet, Rfl: 1     "levETIRAcetam (KEPPRA) 500 MG Tab, Take 1 tablet twice a day by oral route for 30 days. (Patient taking differently: Take 500 mg by mouth 2 (two) times daily.), Disp: 60 tablet, Rfl: 2    pantoprazole (PROTONIX) 40 MG tablet, Take 1 tablet every day by oral route for 30 days. (Patient taking differently: Take 40 mg by mouth once daily.), Disp: 30 tablet, Rfl: 2    sucralfate (CARAFATE) 100 mg/mL suspension, Take 10 mLs (1 g total) by mouth 4 (four) times daily before meals and nightly., Disp: 1200 mL, Rfl: 0    dicyclomine (BENTYL) 10 MG capsule, Take 1 capsule (10 mg total) by mouth 4 (four) times daily as needed (abdominal pain)., Disp: 20 capsule, Rfl: 0    ondansetron (ZOFRAN-ODT) 4 MG TbDL, Take 1 tablet (4 mg total) by mouth every 6 (six) hours as needed., Disp: 20 tablet, Rfl: 0    pen needle, diabetic 31 gauge x 3/16" Ndle, Use as directed to inject insulin 5 times daily, Disp: 100 each, Rfl: 11      Scheduled Meds:  Continuous Infusions:  PRN Meds:.sodium chloride, sodium chloride      Dliia Maldonado  [facility] Hospitalist NP  05/11/2023   "

## 2023-05-11 NOTE — ED PROVIDER NOTES
Encounter Date: 2023       History     Chief Complaint   Patient presents with    Abdominal Pain     ONSET LASTNITE    Nausea    Vomiting     Tabby Howard is a 33 year old female with pmh ESRD on hemodyalysis (), gastroparesis, CHF, hypertension, sickle cell trait, DM presenting to the ED due to low H&H. The patient states that she had labs drawn two days ago and that she was called and instructed to come to the ED for a blood transfusion. She did not receive dialysis today and states that she has had abdominal and back pain since completing dialysis two days ago. She states this pain is similar to her previous gastroparesis pain. She has had no vomiting, diarrhea, or fever. She denies any black/tarry stools, shortness of breath, chest pain, palpitations.       Review of patient's allergies indicates:   Allergen Reactions    Penicillins Hives     Past Medical History:   Diagnosis Date    ESRD on hemodialysis 2022    Gastritis 2022    EGD was 22    Gastroparesis 2022    has not had Emptying study    Heart failure with preserved ejection fraction 2022    EF 55% on 3/22    History of supraventricular tachycardia     Hyperkalemia 2022    Hypertensive emergency 2022    Sickle cell trait 2022    Type 2 diabetes mellitus      Past Surgical History:   Procedure Laterality Date     SECTION      x 3    COLONOSCOPY      COLONOSCOPY N/A 2022    Procedure: COLONOSCOPY;  Surgeon: Jagdeep Cedeno MD;  Location: [facility];  Service: Endoscopy;  Laterality: N/A;    ESOPHAGOGASTRODUODENOSCOPY N/A 10/18/2019    Procedure: ESOPHAGOGASTRODUODENOSCOPY (EGD);  Surgeon: Gianluca Mendez MD;  Location: [facility];  Service: Endoscopy;  Laterality: N/A;    ESOPHAGOGASTRODUODENOSCOPY N/A 2022    Procedure: EGD (ESOPHAGOGASTRODUODENOSCOPY);  Surgeon: Micky Paredes III, MD;  Location: [facility];  Service: Endoscopy;  Laterality: N/A;    ESOPHAGOGASTRODUODENOSCOPY  N/A 12/5/2022    Procedure: EGD (ESOPHAGOGASTRODUODENOSCOPY);  Surgeon: Marcelo Zhong MD;  Location: [facility];  Service: Endoscopy;  Laterality: N/A;    LAPAROSCOPIC CHOLECYSTECTOMY N/A 07/30/2022    Procedure: CHOLECYSTECTOMY, LAPAROSCOPIC;  Surgeon: Grey Perez MD;  Location: [facility] OR;  Service: General;  Laterality: N/A;    PLACEMENT OF DUAL-LUMEN VASCULAR CATHETER Left 07/12/2022    Procedure: INSERTION-CATHETER-JOSEPH;  Surgeon: Dionte Gan MD;  Location: [facility] OR;  Service: General;  Laterality: Left;    PLACEMENT OF DUAL-LUMEN VASCULAR CATHETER Right 07/26/2022    Procedure: INSERTION-CATHETER-Hemosplit;  Surgeon: Dionte Gan MD;  Location: [facility] OR;  Service: General;  Laterality: Right;     Family History   Problem Relation Age of Onset    Diabetes Mother     Diabetes Father      Social History     Tobacco Use    Smoking status: Never    Smokeless tobacco: Never   Substance Use Topics    Alcohol use: Not Currently    Drug use: No     Review of Systems   Constitutional:  Negative for fever.   HENT:  Negative for sore throat.    Respiratory:  Negative for shortness of breath.    Cardiovascular:  Negative for chest pain.   Gastrointestinal:  Positive for abdominal pain. Negative for diarrhea, nausea and vomiting.   Genitourinary:  Negative for dysuria.   Musculoskeletal:  Positive for back pain.   Skin:  Negative for rash.   Neurological:  Negative for weakness.   Hematological:  Does not bruise/bleed easily.     Physical Exam     Initial Vitals [05/11/23 1138]   BP Pulse Resp Temp SpO2   (!) 178/107 93 (!) 22 98.9 °F (37.2 °C) 97 %      MAP       --         Physical Exam    Nursing note and vitals reviewed.  Constitutional: She appears well-developed and well-nourished. She is not diaphoretic. No distress.   HENT:   Head: Normocephalic and atraumatic.   Mouth/Throat: Oropharynx is clear and moist.   Eyes: Conjunctivae are normal.   Neck: Neck supple.   Cardiovascular:  Normal rate, regular  rhythm, normal heart sounds and intact distal pulses.     Exam reveals no gallop and no friction rub.       No murmur heard.  Pulmonary/Chest: Breath sounds normal. No respiratory distress. She has no wheezes. She has no rhonchi. She has no rales.   Abdominal: Abdomen is soft. She exhibits no distension. There is abdominal tenderness (diffuse).   Musculoskeletal:         General: Normal range of motion.      Cervical back: Neck supple.     Neurological: She is alert and oriented to person, place, and time.   Skin: No rash noted. No erythema.   Psychiatric: Her speech is normal.       ED Course   Procedures  Labs Reviewed   CBC W/ AUTO DIFFERENTIAL - Abnormal; Notable for the following components:       Result Value    RBC 2.20 (*)     Hemoglobin 6.5 (*)     Hematocrit 19.8 (*)     RDW 17.7 (*)     Platelets 145 (*)     Lymph # 0.9 (*)     nRBC 2 (*)     Gran % 74.8 (*)     Lymph % 15.1 (*)     All other components within normal limits    Narrative:     Hgb and hct critical result(s) repeated. Called and verbal readback   obtained from Alejandra Lemon ED, NP. by SLLEO 05/11/2023 13:08   COMPREHENSIVE METABOLIC PANEL - Abnormal; Notable for the following components:    Sodium 133 (*)     Chloride 92 (*)     Glucose 362 (*)     BUN 41 (*)     Creatinine 6.0 (*)     Calcium 8.2 (*)     Albumin 3.4 (*)     Total Bilirubin 2.1 (*)     Alkaline Phosphatase 196 (*)     eGFR 8.8 (*)     All other components within normal limits   BETA - HYDROXYBUTYRATE, SERUM - Abnormal; Notable for the following components:    Beta-Hydroxybutyrate 3.1 (*)     All other components within normal limits   PHOSPHORUS - Abnormal; Notable for the following components:    Phosphorus 5.6 (*)     All other components within normal limits   B-TYPE NATRIURETIC PEPTIDE - Abnormal; Notable for the following components:    BNP 1,935 (*)     All other components within normal limits   POCT GLUCOSE - Abnormal; Notable for the following components:    POC  Glucose 366 (*)     All other components within normal limits   ISTAT PROCEDURE - Abnormal; Notable for the following components:    POC PCO2 47.5 (*)     POC PO2 26 (*)     POC HCO3 28.5 (*)     POC SATURATED O2 47 (*)     POC TCO2 30 (*)     All other components within normal limits   LIPASE   MAGNESIUM   HCG, QUANTITATIVE   HCG, QUANTITATIVE   SARS-COV-2 RNA AMPLIFICATION, QUAL   B-TYPE NATRIURETIC PEPTIDE   DRUG SCREEN PANEL, URINE EMERGENCY   TYPE & SCREEN   PREPARE RBC SOFT   PREPARE RBC SOFT   PREPARE RBC SOFT          Imaging Results              X-Ray Chest AP Portable (Final result)  Result time 05/11/23 14:48:31      Final result by Zaid Wilson MD (05/11/23 14:48:31)                   Narrative:    Chest single view    CLINICAL DATA: Hypoxia, abdominal pain    FINDINGS: Comparison to May 3. Cardiomegaly is stable. The mediastinum is unremarkable. Dual lead right IJ central catheter is unchanged in position. The lungs are clear. No osseous abnormalities are identified.    IMPRESSION:  1. No acute process.  2. Stable cardiomegaly.    Electronically signed by:  Zaid Wilson MD  5/11/2023 2:48 PM CDT Workstation: [id number]                                     Medications   0.9%  NaCl infusion (for blood administration) (has no administration in time range)   mupirocin 2 % ointment (1 g Nasal Given 5/11/23 2020)   amitriptyline tablet 50 mg (50 mg Oral Given 5/11/23 2019)   amLODIPine tablet 10 mg (has no administration in time range)   carvediloL tablet 25 mg (25 mg Oral Given 5/11/23 2020)   dicyclomine capsule 10 mg (has no administration in time range)   FLUoxetine capsule 20 mg (has no administration in time range)   gabapentin capsule 100 mg (100 mg Oral Given 5/11/23 2019)   hydrALAZINE tablet 100 mg (100 mg Oral Given 5/11/23 2128)   insulin detemir U-100 (Levemir) pen 6 Units (6 Units Subcutaneous Given 5/11/23 2208)   isosorbide mononitrate 24 hr tablet 90 mg (has no administration in  time range)   levETIRAcetam tablet 500 mg (500 mg Oral Given 5/11/23 2020)   pantoprazole EC tablet 40 mg (has no administration in time range)   sucralfate 100 mg/mL suspension 1 g (1 g Oral Given 5/11/23 2020)   sodium chloride 0.9% flush 10 mL (has no administration in time range)   melatonin tablet 6 mg (6 mg Oral Given 5/11/23 2020)   traMADoL tablet 50 mg (50 mg Oral Given 5/11/23 2020)   ondansetron injection 4 mg (has no administration in time range)   acetaminophen tablet 650 mg (has no administration in time range)   glucose chewable tablet 16 g (has no administration in time range)   glucose chewable tablet 24 g (has no administration in time range)   dextrose 50% injection 12.5 g (has no administration in time range)   dextrose 50% injection 25 g (has no administration in time range)   glucagon (human recombinant) injection 1 mg (has no administration in time range)   morphine injection 4 mg (4 mg Intravenous Given 5/11/23 1241)   ondansetron injection 4 mg (4 mg Intravenous Given 5/11/23 1238)   morphine injection 2 mg (2 mg Intravenous Given 5/11/23 2128)           APC / Resident Notes:   MDM    Patient presents for emergent evaluation of acute low H&H that poses a threat to life and/or bodily function.    In the ED patient found to have acute anemia, urgent need for dialysis, ESRD.    I ordered labs and personally reviewed them.  Labs significant for:  05/11/23 12:32  RBC: 2.20 (L)  Hemoglobin: 6.5 (LL)  Hematocrit: 19.8 (LL)  MCV: 90  MCH: 29.5  MCHC: 32.8  RDW: 17.7 (H)  Platelets: 145 (L)  05/11/23 12:32  Sodium: 133 (L)  Potassium: 4.3  Chloride: 92 (L)  CO2: 26  Anion Gap: 15  BUN: 41 (H)  Creatinine: 6.0 (H)  eGFR: 8.8 !  Glucose: 362 (H)  Calcium: 8.2 (L)  Phosphorus: 5.6 (H)  Magnesium: 1.8  Alkaline Phosphatase: 196 (H)  05/11/23 12:32  BNP: 1,935 (H)  Beta-Hydroxybutyrate: 3.1 (H)              I ordered X-rays and personally reviewed them and reviewed the radiologist interpretation.  Xray  significant for stable cardiomegaly. No acute process.       Admit MDM  I discussed the patient presentation labs, ekg, X-rays, CT findings with the consultant for nephrology, [facility] medicine (speciality).    Patient was managed in the ED with IV PRBC infusion.    The response to treatment was patient remained stable.    The patient will require admission for urgent dialysis as well as blood transfusion for H&H 6, 19. She is stable in the ED. She does have generalized abdominal pain that is similar to prior gastroparesis. Pain was resolved after IV morphine.   Dialysis/transfusion orders placed by nephrology.        Attending Attestation:     Physician Attestation Statement for NP/PA:   I discussed this assessment and plan of this patient with the NP/PA, but I did not personally examine the patient. The face to face encounter was performed by the NP/PA.                         Clinical Impression:   Final diagnoses:  [R09.02] Hypoxia  [N18.6, Z99.2] End-stage renal disease needing dialysis (Primary)  [D64.9] Anemia, unspecified type  [Z51.89] Encounter for blood transfusion  [D64.9] Symptomatic anemia        ED Disposition Condition    Admit Stable                Alejandra Lemon NP  05/11/23 2605       Jazmin Moore MD  05/11/23 1559

## 2023-05-11 NOTE — CARE UPDATE
05/11/23 1401   Patient Assessment/Suction   Level of Consciousness (AVPU) alert   PRE-TX-O2   Device (Oxygen Therapy) room air   SpO2 (!) 88 %   Pulse 84   Resp (!) 9   BP (!) 152/90   Education   $ Education Other (see comment);15 min  (VBG)   Labs   $ Was an ABG obtained? Venous Puncture;ISTAT - Blood gas   $ Labs Tech Time 15 min   Critical Value Communication   Doctor not notified of critical value due to: known abnormal - expected abnormal   Name of Notified Physician/Designee ALONZO TANG NP   Date Notified 05/11/23   Time Notified 1401   Read Back Verification Yes   Physician Directive NO NEW ORDERS

## 2023-05-11 NOTE — CONSULTS
Consult Note  Nephrology    Consult Requested By: Jazmin Moore MD    Reason for Consult:  ESRD, dependent on dialysis    SUBJECTIVE:     History of Present Illness:  32 y/o patient known to our practice, has out patient dialysis on TTS schedule.  Labs were drawn Tuesday, Hgb was 5.9.  Several attempts were made by dialysis unit to contact pt to have her go to ER, they finally got her this am, family agreed to take her.  Nephrology is consulted for dialysis orders.      Hgb 6.5, ordered 2u PRBC w/HD.  Pt w/fatigue, chronic abd pain.    Assessment/plan:    ESRD  Anemia of chronic dz, requiring transfusion  SHPT  HTN  DM 2    --dialysis today and every TTS per pt schedule.  --transfuse 2u PRBC w/HD today.  Goal Hgb for dialysis pts 10-11  --binder w/meals as indicated  --pressures elevated, should come down w/fluid removal.  Will order epo when BP better controlled  --Blood glucose control per primary team.        Past Medical History:   Diagnosis Date    ESRD on hemodialysis 2022    Gastritis 2022    EGD was 22    Gastroparesis 2022    has not had Emptying study    Heart failure with preserved ejection fraction 2022    EF 55% on 3/22    History of supraventricular tachycardia     Hyperkalemia 2022    Hypertensive emergency 2022    Sickle cell trait 2022    Type 2 diabetes mellitus      Past Surgical History:   Procedure Laterality Date     SECTION      x 3    COLONOSCOPY      COLONOSCOPY N/A 2022    Procedure: COLONOSCOPY;  Surgeon: Jagdeep Cedeno MD;  Location: [facility];  Service: Endoscopy;  Laterality: N/A;    ESOPHAGOGASTRODUODENOSCOPY N/A 10/18/2019    Procedure: ESOPHAGOGASTRODUODENOSCOPY (EGD);  Surgeon: Gianluca Mendez MD;  Location: [facility];  Service: Endoscopy;  Laterality: N/A;    ESOPHAGOGASTRODUODENOSCOPY N/A 2022    Procedure: EGD (ESOPHAGOGASTRODUODENOSCOPY);  Surgeon: Micky Paredes III, MD;  Location: [facility];  Service:  Pt's wife aware that follow up and lipids needed, pt currently going through cancer treatment which limits his visits. Refill sent as requested.    Endoscopy;  Laterality: N/A;    ESOPHAGOGASTRODUODENOSCOPY N/A 12/5/2022    Procedure: EGD (ESOPHAGOGASTRODUODENOSCOPY);  Surgeon: Marcelo Zhong MD;  Location: [facility] ENDO;  Service: Endoscopy;  Laterality: N/A;    LAPAROSCOPIC CHOLECYSTECTOMY N/A 07/30/2022    Procedure: CHOLECYSTECTOMY, LAPAROSCOPIC;  Surgeon: Grey Perez MD;  Location: [facility] OR;  Service: General;  Laterality: N/A;    PLACEMENT OF DUAL-LUMEN VASCULAR CATHETER Left 07/12/2022    Procedure: INSERTION-CATHETER-JOSEPH;  Surgeon: Dionte Gan MD;  Location: [facility] OR;  Service: General;  Laterality: Left;    PLACEMENT OF DUAL-LUMEN VASCULAR CATHETER Right 07/26/2022    Procedure: INSERTION-CATHETER-Hemosplit;  Surgeon: Dionte Gan MD;  Location: [facility] OR;  Service: General;  Laterality: Right;     Family History   Problem Relation Age of Onset    Diabetes Mother     Diabetes Father      Social History     Tobacco Use    Smoking status: Never    Smokeless tobacco: Never   Substance Use Topics    Alcohol use: Not Currently    Drug use: No       Review of patient's allergies indicates:   Allergen Reactions    Penicillins Hives        Review of Systems:  Negative unless noted above    OBJECTIVE:     Vital Signs Range (Last 24H):  Temp:  [98.9 °F (37.2 °C)]   Pulse:  []   Resp:  [18-22]   BP: (176-178)/()   SpO2:  [95 %-100 %]     Physical Exam:  General- NAD noted  HEENT- WNL  Neck- supple  CV- Regular rate and rhythm  Resp- Lungs CTA Bilaterally, No increased WOB  GI- Non tender/non-distended, BS normoactive x4 quads  Extrem- No cyanosis, clubbing, edema.  Derm- skin w/d  Neuro-  No flap.     Body mass index is 21.77 kg/m².    Laboratory:  CBC:   Recent Labs   Lab 05/11/23  1232   WBC 5.90   RBC 2.20*   HGB 6.5*   HCT 19.8*   *   MCV 90   MCH 29.5   MCHC 32.8     CMP:   Recent Labs   Lab 05/11/23  1232   *   CALCIUM 8.2*   ALBUMIN 3.4*   PROT 7.0   *   K 4.3   CO2 26   CL 92*   BUN 41*   CREATININE 6.0*    ALKPHOS 196*   ALT 41   AST 32   BILITOT 2.1*       Diagnostic Results:  Labs: Reviewed      ASSESSMENT/PLAN:     There are no hospital problems to display for this patient.        Thank you for allowing us to participate in the care of your patient. We will follow the patient and provide recommendations as needed.      Time spent seeing patient( greater than 1/2 spent in direct contact) :     Geeta Kaur NP

## 2023-05-11 NOTE — ED NOTES
Pt pulse ox drops down in the 20s on room air. Pt asleep and awakens easily to verbal stimuli. Pulse ox goes back up in the high 90s when awake.  Md notified.

## 2023-05-12 VITALS
SYSTOLIC BLOOD PRESSURE: 104 MMHG | BODY MASS INDEX: 19.76 KG/M2 | OXYGEN SATURATION: 95 % | HEIGHT: 62 IN | RESPIRATION RATE: 19 BRPM | DIASTOLIC BLOOD PRESSURE: 64 MMHG | WEIGHT: 107.38 LBS | HEART RATE: 74 BPM | TEMPERATURE: 98 F

## 2023-05-12 PROBLEM — D64.9 SYMPTOMATIC ANEMIA: Status: RESOLVED | Noted: 2022-10-06 | Resolved: 2023-05-12

## 2023-05-12 LAB
ALBUMIN SERPL BCP-MCNC: 3.2 G/DL (ref 3.5–5.2)
ALP SERPL-CCNC: 205 U/L (ref 55–135)
ALT SERPL W/O P-5'-P-CCNC: 37 U/L (ref 10–44)
ANION GAP SERPL CALC-SCNC: 7 MMOL/L (ref 8–16)
AST SERPL-CCNC: 29 U/L (ref 10–40)
BASOPHILS # BLD AUTO: 0.04 K/UL (ref 0–0.2)
BASOPHILS NFR BLD: 0.7 % (ref 0–1.9)
BILIRUB SERPL-MCNC: 1.7 MG/DL (ref 0.1–1)
BUN SERPL-MCNC: 17 MG/DL (ref 6–20)
CALCIUM SERPL-MCNC: 9.1 MG/DL (ref 8.7–10.5)
CHLORIDE SERPL-SCNC: 105 MMOL/L (ref 95–110)
CO2 SERPL-SCNC: 26 MMOL/L (ref 23–29)
CREAT SERPL-MCNC: 3.6 MG/DL (ref 0.5–1.4)
DIFFERENTIAL METHOD: ABNORMAL
EOSINOPHIL # BLD AUTO: 0.1 K/UL (ref 0–0.5)
EOSINOPHIL NFR BLD: 2.1 % (ref 0–8)
ERYTHROCYTE [DISTWIDTH] IN BLOOD BY AUTOMATED COUNT: 17.4 % (ref 11.5–14.5)
EST. GFR  (NO RACE VARIABLE): 16.3 ML/MIN/1.73 M^2
GLUCOSE SERPL-MCNC: 104 MG/DL (ref 70–110)
GLUCOSE SERPL-MCNC: 189 MG/DL (ref 70–110)
GLUCOSE SERPL-MCNC: 266 MG/DL (ref 70–110)
GLUCOSE SERPL-MCNC: 88 MG/DL (ref 70–110)
HCT VFR BLD AUTO: 33.4 % (ref 37–48.5)
HGB BLD-MCNC: 11.2 G/DL (ref 12–16)
IMM GRANULOCYTES # BLD AUTO: 0.07 K/UL (ref 0–0.04)
IMM GRANULOCYTES NFR BLD AUTO: 1.2 % (ref 0–0.5)
LYMPHOCYTES # BLD AUTO: 1.1 K/UL (ref 1–4.8)
LYMPHOCYTES NFR BLD: 18.3 % (ref 18–48)
MCH RBC QN AUTO: 29.9 PG (ref 27–31)
MCHC RBC AUTO-ENTMCNC: 33.5 G/DL (ref 32–36)
MCV RBC AUTO: 89 FL (ref 82–98)
MONOCYTES # BLD AUTO: 0.5 K/UL (ref 0.3–1)
MONOCYTES NFR BLD: 8.4 % (ref 4–15)
NEUTROPHILS # BLD AUTO: 4 K/UL (ref 1.8–7.7)
NEUTROPHILS NFR BLD: 69.3 % (ref 38–73)
NRBC BLD-RTO: 2 /100 WBC
PLATELET # BLD AUTO: 150 K/UL (ref 150–450)
PMV BLD AUTO: 9.8 FL (ref 9.2–12.9)
POTASSIUM SERPL-SCNC: 4.2 MMOL/L (ref 3.5–5.1)
PROT SERPL-MCNC: 7.1 G/DL (ref 6–8.4)
RBC # BLD AUTO: 3.74 M/UL (ref 4–5.4)
SODIUM SERPL-SCNC: 138 MMOL/L (ref 136–145)
WBC # BLD AUTO: 5.8 K/UL (ref 3.9–12.7)

## 2023-05-12 PROCEDURE — 85025 COMPLETE CBC W/AUTO DIFF WBC: CPT | Performed by: NURSE PRACTITIONER

## 2023-05-12 PROCEDURE — 96376 TX/PRO/DX INJ SAME DRUG ADON: CPT | Mod: 59

## 2023-05-12 PROCEDURE — 25000003 PHARM REV CODE 250: Performed by: NURSE PRACTITIONER

## 2023-05-12 PROCEDURE — 94761 N-INVAS EAR/PLS OXIMETRY MLT: CPT

## 2023-05-12 PROCEDURE — 36415 COLL VENOUS BLD VENIPUNCTURE: CPT | Performed by: NURSE PRACTITIONER

## 2023-05-12 PROCEDURE — 27000221 HC OXYGEN, UP TO 24 HOURS

## 2023-05-12 PROCEDURE — 99900031 HC PATIENT EDUCATION (STAT)

## 2023-05-12 PROCEDURE — G0378 HOSPITAL OBSERVATION PER HR: HCPCS

## 2023-05-12 PROCEDURE — 63600175 PHARM REV CODE 636 W HCPCS: Performed by: HOSPITALIST

## 2023-05-12 PROCEDURE — 99900035 HC TECH TIME PER 15 MIN (STAT)

## 2023-05-12 PROCEDURE — 80053 COMPREHEN METABOLIC PANEL: CPT | Performed by: NURSE PRACTITIONER

## 2023-05-12 RX ORDER — MORPHINE SULFATE 2 MG/ML
2 INJECTION, SOLUTION INTRAMUSCULAR; INTRAVENOUS EVERY 4 HOURS PRN
Status: DISCONTINUED | OUTPATIENT
Start: 2023-05-12 | End: 2023-05-12 | Stop reason: HOSPADM

## 2023-05-12 RX ADMIN — AMLODIPINE BESYLATE 10 MG: 5 TABLET ORAL at 08:05

## 2023-05-12 RX ADMIN — SUCRALFATE 1 G: 1 SUSPENSION ORAL at 05:05

## 2023-05-12 RX ADMIN — GABAPENTIN 100 MG: 100 CAPSULE ORAL at 08:05

## 2023-05-12 RX ADMIN — PANTOPRAZOLE SODIUM 40 MG: 40 TABLET, DELAYED RELEASE ORAL at 05:05

## 2023-05-12 RX ADMIN — GABAPENTIN 100 MG: 100 CAPSULE ORAL at 04:05

## 2023-05-12 RX ADMIN — LEVETIRACETAM 500 MG: 500 TABLET, FILM COATED ORAL at 08:05

## 2023-05-12 RX ADMIN — INSULIN DETEMIR 6 UNITS: 100 INJECTION, SOLUTION SUBCUTANEOUS at 08:05

## 2023-05-12 RX ADMIN — CARVEDILOL 25 MG: 25 TABLET, FILM COATED ORAL at 08:05

## 2023-05-12 RX ADMIN — SUCRALFATE 1 G: 1 SUSPENSION ORAL at 04:05

## 2023-05-12 RX ADMIN — MUPIROCIN 1 G: 20 OINTMENT TOPICAL at 08:05

## 2023-05-12 RX ADMIN — FLUOXETINE 20 MG: 20 CAPSULE ORAL at 08:05

## 2023-05-12 RX ADMIN — MORPHINE SULFATE 2 MG: 2 INJECTION, SOLUTION INTRAMUSCULAR; INTRAVENOUS at 05:05

## 2023-05-12 RX ADMIN — ISOSORBIDE MONONITRATE 90 MG: 60 TABLET, EXTENDED RELEASE ORAL at 08:05

## 2023-05-12 RX ADMIN — HYDRALAZINE HYDROCHLORIDE 100 MG: 25 TABLET, FILM COATED ORAL at 05:05

## 2023-05-12 RX ADMIN — SUCRALFATE 1 G: 1 SUSPENSION ORAL at 12:05

## 2023-05-12 NOTE — PLAN OF CARE
05/12/23 1202   Patient Assessment/Suction   Level of Consciousness (AVPU) alert   Respiratory Effort Normal;Unlabored   Rhythm/Pattern, Respiratory no shortness of breath reported   PRE-TX-O2   Device (Oxygen Therapy) nasal cannula   $ Is the patient on Low Flow Oxygen? Yes   Flow (L/min) 2   SpO2 95 %   Pulse Oximetry Type Intermittent   $ Pulse Oximetry - Multiple Charge Pulse Oximetry - Multiple   Pulse 77   Resp 18   Education   $ Education 15 min   Respiratory Evaluation   $ Care Plan Tech Time 15 min        05/12/23 1202   Patient Assessment/Suction   Level of Consciousness (AVPU) alert   Respiratory Effort Normal;Unlabored   Rhythm/Pattern, Respiratory no shortness of breath reported   PRE-TX-O2   Device (Oxygen Therapy) nasal cannula   $ Is the patient on Low Flow Oxygen? Yes   Flow (L/min) 2   SpO2 95 %   Pulse Oximetry Type Intermittent   $ Pulse Oximetry - Multiple Charge Pulse Oximetry - Multiple   Pulse 77   Resp 18   Education   $ Education 15 min   Respiratory Evaluation   $ Care Plan Tech Time 15 min

## 2023-05-12 NOTE — NURSING
Called HD report to SHAILA Duval  Pt to go to 2514, awaiting transport to unit  RN cannot come get pt until she receives report from ER

## 2023-05-12 NOTE — CONSULTS
Consult Note  Nephrology    Consult Requested By: Alfonso Garsia MD    Reason for Consult:  ESRD, dependent on dialysis    SUBJECTIVE:     History of Present Illness:  32 y/o patient known to our practice, has out patient dialysis on TTS schedule.  Labs were drawn Tuesday, Hgb was 5.9.  Several attempts were made by dialysis unit to contact pt to have her go to ER, they finally got her this am, family agreed to take her.  Nephrology is consulted for dialysis orders.      Hgb 6.5, ordered 2u PRBC w/HD.  Pt w/fatigue, chronic abd pain.    AFVSS/   Distress.        Assessment/plan:    ESRD  Anemia of chronic dz, requiring transfusion  SHPT  HTN  DM 2    --dialysis today and every TTS per pt schedule.  --transfused 2u PRBC w/HD    Goal Hgb for dialysis pts 10-11  --binder w/meals as indicated  --pressures elevated, should come down w/fluid removal.  Will order epo when BP better controlled  --Blood glucose control per primary team.        Past Medical History:   Diagnosis Date    ESRD on hemodialysis 2022    Gastritis 2022    EGD was 22    Gastroparesis 2022    has not had Emptying study    Heart failure with preserved ejection fraction 2022    EF 55% on 3/22    History of supraventricular tachycardia     Hyperkalemia 2022    Hypertensive emergency 2022    Sickle cell trait 2022    Type 2 diabetes mellitus      Past Surgical History:   Procedure Laterality Date     SECTION      x 3    COLONOSCOPY      COLONOSCOPY N/A 2022    Procedure: COLONOSCOPY;  Surgeon: Jagdeep Cedeno MD;  Location: [facility] ENDO;  Service: Endoscopy;  Laterality: N/A;    ESOPHAGOGASTRODUODENOSCOPY N/A 10/18/2019    Procedure: ESOPHAGOGASTRODUODENOSCOPY (EGD);  Surgeon: Gianluca Mendez MD;  Location: [facility] ENDO;  Service: Endoscopy;  Laterality: N/A;    ESOPHAGOGASTRODUODENOSCOPY N/A 2022    Procedure: EGD (ESOPHAGOGASTRODUODENOSCOPY);  Surgeon: Micky Paredes III, MD;   Location: [facility] ENDO;  Service: Endoscopy;  Laterality: N/A;    ESOPHAGOGASTRODUODENOSCOPY N/A 12/5/2022    Procedure: EGD (ESOPHAGOGASTRODUODENOSCOPY);  Surgeon: Marcelo Zhong MD;  Location: [facility] ENDO;  Service: Endoscopy;  Laterality: N/A;    LAPAROSCOPIC CHOLECYSTECTOMY N/A 07/30/2022    Procedure: CHOLECYSTECTOMY, LAPAROSCOPIC;  Surgeon: Grey Perez MD;  Location: [facility] OR;  Service: General;  Laterality: N/A;    PLACEMENT OF DUAL-LUMEN VASCULAR CATHETER Left 07/12/2022    Procedure: INSERTION-CATHETER-JOSEPH;  Surgeon: Dionte Gan MD;  Location: [facility] OR;  Service: General;  Laterality: Left;    PLACEMENT OF DUAL-LUMEN VASCULAR CATHETER Right 07/26/2022    Procedure: INSERTION-CATHETER-Hemosplit;  Surgeon: Dionte Gan MD;  Location: [facility] OR;  Service: General;  Laterality: Right;     Family History   Problem Relation Age of Onset    Diabetes Mother     Diabetes Father      Social History     Tobacco Use    Smoking status: Never    Smokeless tobacco: Never   Substance Use Topics    Alcohol use: Not Currently    Drug use: No       Review of patient's allergies indicates:   Allergen Reactions    Penicillins Hives        Review of Systems:  Negative unless noted above    OBJECTIVE:     Vital Signs Range (Last 24H):  Temp:  [97.5 °F (36.4 °C)-98.9 °F (37.2 °C)]   Pulse:  [74-90]   Resp:  [18-20]   BP: ()/()   SpO2:  [93 %-100 %]     Physical Exam:  General- NAD noted  HEENT- WNL  Neck- supple  CV- Regular rate and rhythm  Resp- Lungs CTA Bilaterally, No increased WOB  GI- Non tender/non-distended, BS normoactive x4 quads  Extrem- No cyanosis, clubbing, edema.  Derm- skin w/d  Neuro-  No flap.     Body mass index is 19.64 kg/m².    Laboratory:  CBC:   Recent Labs   Lab 05/12/23 0419   WBC 5.80   RBC 3.74*   HGB 11.2*   HCT 33.4*      MCV 89   MCH 29.9   MCHC 33.5       CMP:   Recent Labs   Lab 05/12/23 0419      CALCIUM 9.1   ALBUMIN 3.2*   PROT 7.1      K 4.2   CO2 26       BUN 17   CREATININE 3.6*   ALKPHOS 205*   ALT 37   AST 29   BILITOT 1.7*         Diagnostic Results:  Labs: Reviewed      ASSESSMENT/PLAN:     Active Hospital Problems    Diagnosis  POA    Chronic deep vein thrombosis (DVT) of left upper extremity [I82.722]  Yes    Type 2 diabetes mellitus with hyperglycemia, with long-term current use of insulin, previous HbA1c 5.8% [E11.65, Z79.4]  Not Applicable    ESRD (end stage renal disease) [N18.6]  Unknown      Resolved Hospital Problems    Diagnosis Date Resolved POA    *Symptomatic anemia [D64.9] 05/12/2023 Unknown           Thank you for allowing us to participate in the care of your patient. We will follow the patient and provide recommendations as needed.      Time spent seeing patient( greater than 1/2 spent in direct contact) :     Hugh Figueroa MD

## 2023-05-12 NOTE — PLAN OF CARE
[facility]  Initial Discharge Assessment       Primary Care Provider: AIDEN CONNER NP    Admission Diagnosis: Symptomatic anemia [D64.9]    Admission Date: 5/11/2023  Expected Discharge Date: 5/12/2023         Payor: MEDICAID / Plan: HEALTH[place] BLUE [place] / Product Type: Managed Medicaid /     Extended Emergency Contact Information  Primary Emergency Contact: Tanya Howard  Address: [address] [place] [address], [place] [address] [facility]  Home Phone: [phone]  Mobile Phone: [phone]  Relation: Step parent  Preferred language: English   needed? No  Secondary Emergency Contact: Garcia Howard  Address: [address] [place] [address]  Mobile Phone: [phone]  Relation: Father  Preferred language: English   needed? No    Discharge Plan A: Home with family  Discharge Plan B: Home with family      [facility] [place]  [address]  [facility] [address]  Phone: [phone] Fax: [phone]      Initial Assessment (most recent)       Adult Discharge Assessment - 05/12/23 1204          Discharge Assessment    Assessment Type Discharge Planning Assessment     People in Home parent(s)     Do you expect to return to your current living situation? Yes     Do you have help at home or someone to help you manage your care at home? Yes     Prior to hospitilization cognitive status: Alert/Oriented;No Deficits     Equipment Currently Used at Home walker, standard     Readmission within 30 days? Yes     Patient currently being followed by outpatient case management? No     Do you currently have service(s) that help you manage your care at home? No     Do you take prescription medications? Yes     Do you have prescription coverage? Yes     Coverage [place] Medicaid     Discharge Plan A Home with family     Discharge Plan B Home with family     DME Needed Upon Discharge  none

## 2023-05-12 NOTE — PLAN OF CARE
Discharge orders and chart reviewed with no further post-acute discharge needs identified at this time.  At this time, patient is cleared for discharge from Case Management standpoint.        05/12/23 1205   Final Note   Assessment Type Final Discharge Note   Anticipated Discharge Disposition Home   Post-Acute Status   Post-Acute Authorization Other   Other Status No Post-Acute Service Needs   Discharge Delays None known at this time

## 2023-05-12 NOTE — NURSING
Per family member Tanya Howard, the patient's father will pick her up this evening after he gets off from work which is around 6 pm or possibly earlier.

## 2023-05-12 NOTE — PLAN OF CARE
Problem: Adult Inpatient Plan of Care  Goal: Plan of Care Review  Outcome: Adequate for Care Transition  Goal: Patient-Specific Goal (Individualized)  Outcome: Ongoing, Progressing  Goal: Absence of Hospital-Acquired Illness or Injury  Outcome: Ongoing, Progressing  Goal: Optimal Comfort and Wellbeing  Outcome: Ongoing, Progressing  Goal: Readiness for Transition of Care  Outcome: Ongoing, Progressing

## 2023-05-13 NOTE — DISCHARGE SUMMARY
[facility]  Discharge Summary      Patient Name: Tabby Howard  MRN: [id number]  UNIQUE: [id number]  Patient Class: IP- Inpatient  Admission Date: 5/11/2023  Hospital Length of Stay: 1 days  Discharge Date and Time: 5/12/2023  7:15 PM  Attending Physician: No att. providers found   Discharging Provider: Alfonso Garsia MD  Primary Care Provider: AIDEN CONNER NP    Primary Care Team: Networked reference to record PCT     HPI:   No notes on file    * No surgery found *      Hospital Course:   Pt with H/o Multiple hospital admissions and ER visits / Non compliance got admitted with missed Dialysis and acute on Chronic anemia  Pt had pRBC and had HD sessions'  Pts condition got better and was later discharged back to home        Goals of Care Treatment Preferences:  Code Status: Full Code    Health care agent: Step-Mother Tanya Howard / Father Garcia Howard  Health care agent number: [phone] / [phone]          What is most important right now is to focus on remaining as independent as possible.  Accordingly, we have decided that the best plan to meet the patient's goals includes continuing with treatment.      Consults:     No new Assessment & Plan notes have been filed under this hospital service since the last note was generated.  Service: Hospital Medicine    Final Active Diagnoses:    Diagnosis Date Noted POA    Chronic deep vein thrombosis (DVT) of left upper extremity [I82.722] 04/10/2023 Yes    Type 2 diabetes mellitus with hyperglycemia, with long-term current use of insulin, previous HbA1c 5.8% [E11.65, Z79.4] 01/27/2023 Not Applicable    ESRD (end stage renal disease) [N18.6] 07/22/2022 Unknown      Problems Resolved During this Admission:    Diagnosis Date Noted Date Resolved POA    PRINCIPAL PROBLEM:  Symptomatic anemia [D64.9] 10/06/2022 05/12/2023 Unknown       Discharged Condition: good    Disposition: Home or Self Care    Follow Up:    Patient Instructions:  "  No discharge procedures on file.      Pending Diagnostic Studies:     None         Medications:  Reconciled Home Medications:      Medication List      CHANGE how you take these medications    carvediloL 25 MG tablet  Commonly known as: COREG  Take 1 tablet twice a day by oral route for 30 days.  What changed:   · how much to take  · how to take this  · when to take this     FLUoxetine 20 MG capsule  Take 1 capsule every day by oral route for 30 days.  What changed:   · how much to take  · how to take this  · when to take this     gabapentin 100 MG capsule  Commonly known as: NEURONTIN  Take 1 capsule by mouth 3 times daily  What changed:   · how much to take  · how to take this  · when to take this     levETIRAcetam 500 MG Tab  Commonly known as: KEPPRA  Take 1 tablet twice a day by oral route for 30 days.  What changed:   · how much to take  · when to take this     pantoprazole 40 MG tablet  Commonly known as: PROTONIX  Take 1 tablet every day by oral route for 30 days.  What changed:   · how much to take  · when to take this        CONTINUE taking these medications    amitriptyline 50 MG tablet  Commonly known as: ELAVIL  Take 50 mg by mouth every evening.     amLODIPine 5 MG tablet  Commonly known as: NORVASC  Take 2 tablets (10 mg total) by mouth once daily.     BD ULTRA-FINE MINI PEN NEEDLE 31 gauge x 3/16" Ndle  Generic drug: pen needle, diabetic  Use as directed to inject insulin 5 times daily     dicyclomine 10 MG capsule  Commonly known as: BENTYL  Take 1 capsule (10 mg total) by mouth 4 (four) times daily as needed (abdominal pain).     ELIQUIS 5 mg Tab  Generic drug: apixaban  Take 1 tablet (5 mg total) by mouth 2 (two) times daily.     hydrALAZINE 100 MG tablet  Commonly known as: APRESOLINE  Take 1 tablet (100 mg total) by mouth every 8 (eight) hours.     insulin detemir U-100 (Levemir) 100 unit/mL (3 mL) Inpn pen  Inject 6 Units into the skin 2 (two) times daily.     isosorbide mononitrate 30 MG 24 " hr tablet  Commonly known as: IMDUR  Take 3 tablets (90 mg total) by mouth once daily.     NovoLOG FlexPen U-100 Insulin 100 unit/mL (3 mL) Inpn pen  Generic drug: insulin aspart U-100  Inject 4 Units into the skin 3 (three) times daily with meals.     ondansetron 4 MG Tbdl  Commonly known as: ZOFRAN-ODT  Take 1 tablet (4 mg total) by mouth every 6 (six) hours as needed.     sucralfate 100 mg/mL suspension  Commonly known as: CARAFATE  Take 10 mLs (1 g total) by mouth 4 (four) times daily before meals and nightly.            Indwelling Lines/Drains at time of discharge:   Lines/Drains/Airways     Central Venous Catheter Line  Duration                Hemodialysis Catheter 12/09/22 right subclavian 155 days              Physical Exam  Cardiovascular:      Rate and Rhythm: Normal rate.   Neurological:      General: No focal deficit present.      Mental Status: She is alert.       Time spent on the discharge of patient: 45 minutes         Alfonso Garsia MD  Department of Hospital Medicine  [facility] hospital

## 2023-05-13 NOTE — HOSPITAL COURSE
Pt with H/o Multiple hospital admissions and ER visits / Non compliance got admitted with missed Dialysis and acute on Chronic anemia  Pt had pRBC and had HD sessions'  Pts condition got better and was later discharged back to home

## 2023-05-15 ENCOUNTER — HOSPITAL ENCOUNTER (OUTPATIENT)
Facility: HOSPITAL | Age: 33
Discharge: HOME | End: 2023-05-17
Attending: EMERGENCY MEDICINE
Payer: MEDICAID

## 2023-05-15 DIAGNOSIS — N18.5 ANEMIA OF CHRONIC KIDNEY FAILURE, STAGE 5: Chronic | ICD-10-CM

## 2023-05-15 DIAGNOSIS — R07.9 CHEST PAIN: ICD-10-CM

## 2023-05-15 DIAGNOSIS — I31.39 PERICARDIAL EFFUSION: ICD-10-CM

## 2023-05-15 DIAGNOSIS — R73.9 HYPERGLYCEMIA: Primary | ICD-10-CM

## 2023-05-15 DIAGNOSIS — D63.1 ANEMIA OF CHRONIC KIDNEY FAILURE, STAGE 5: Chronic | ICD-10-CM

## 2023-05-15 DIAGNOSIS — R10.30 LOWER ABDOMINAL PAIN: ICD-10-CM

## 2023-05-15 LAB
ALBUMIN SERPL BCP-MCNC: 3.4 G/DL (ref 3.5–5.2)
ALP SERPL-CCNC: 178 U/L (ref 55–135)
ALT SERPL W/O P-5'-P-CCNC: 22 U/L (ref 10–44)
ANION GAP SERPL CALC-SCNC: 16 MMOL/L (ref 8–16)
AST SERPL-CCNC: 23 U/L (ref 10–40)
BASOPHILS # BLD AUTO: 0.02 K/UL (ref 0–0.2)
BASOPHILS NFR BLD: 0.4 % (ref 0–1.9)
BILIRUB SERPL-MCNC: 1.7 MG/DL (ref 0.1–1)
BUN SERPL-MCNC: 30 MG/DL (ref 6–20)
CALCIUM SERPL-MCNC: 9.1 MG/DL (ref 8.7–10.5)
CHLORIDE SERPL-SCNC: 93 MMOL/L (ref 95–110)
CO2 SERPL-SCNC: 24 MMOL/L (ref 23–29)
CREAT SERPL-MCNC: 5.6 MG/DL (ref 0.5–1.4)
DIFFERENTIAL METHOD: ABNORMAL
EOSINOPHIL # BLD AUTO: 0.1 K/UL (ref 0–0.5)
EOSINOPHIL NFR BLD: 2 % (ref 0–8)
ERYTHROCYTE [DISTWIDTH] IN BLOOD BY AUTOMATED COUNT: 17.1 % (ref 11.5–14.5)
EST. GFR  (NO RACE VARIABLE): 9.6 ML/MIN/1.73 M^2
GLUCOSE SERPL-MCNC: 399 MG/DL (ref 70–110)
HCT VFR BLD AUTO: 27.6 % (ref 37–48.5)
HGB BLD-MCNC: 9.7 G/DL (ref 12–16)
IMM GRANULOCYTES # BLD AUTO: 0.03 K/UL (ref 0–0.04)
IMM GRANULOCYTES NFR BLD AUTO: 0.5 % (ref 0–0.5)
LYMPHOCYTES # BLD AUTO: 0.7 K/UL (ref 1–4.8)
LYMPHOCYTES NFR BLD: 13.2 % (ref 18–48)
MCH RBC QN AUTO: 30 PG (ref 27–31)
MCHC RBC AUTO-ENTMCNC: 35.1 G/DL (ref 32–36)
MCV RBC AUTO: 85 FL (ref 82–98)
MONOCYTES # BLD AUTO: 0.3 K/UL (ref 0.3–1)
MONOCYTES NFR BLD: 5.1 % (ref 4–15)
NEUTROPHILS # BLD AUTO: 4.3 K/UL (ref 1.8–7.7)
NEUTROPHILS NFR BLD: 78.8 % (ref 38–73)
NRBC BLD-RTO: 0 /100 WBC
PLATELET # BLD AUTO: 85 K/UL (ref 150–450)
PLATELET BLD QL SMEAR: ABNORMAL
PMV BLD AUTO: 9.9 FL (ref 9.2–12.9)
POTASSIUM SERPL-SCNC: 4.3 MMOL/L (ref 3.5–5.1)
PROT SERPL-MCNC: 6.9 G/DL (ref 6–8.4)
RBC # BLD AUTO: 3.23 M/UL (ref 4–5.4)
SODIUM SERPL-SCNC: 133 MMOL/L (ref 136–145)
WBC # BLD AUTO: 5.47 K/UL (ref 3.9–12.7)

## 2023-05-15 PROCEDURE — 96374 THER/PROPH/DIAG INJ IV PUSH: CPT

## 2023-05-15 PROCEDURE — 85025 COMPLETE CBC W/AUTO DIFF WBC: CPT | Performed by: EMERGENCY MEDICINE

## 2023-05-15 PROCEDURE — 63600175 PHARM REV CODE 636 W HCPCS: Performed by: EMERGENCY MEDICINE

## 2023-05-15 PROCEDURE — 80053 COMPREHEN METABOLIC PANEL: CPT | Performed by: EMERGENCY MEDICINE

## 2023-05-15 PROCEDURE — 99285 EMERGENCY DEPT VISIT HI MDM: CPT | Mod: 25

## 2023-05-15 RX ORDER — HYDRALAZINE HYDROCHLORIDE 20 MG/ML
10 INJECTION INTRAMUSCULAR; INTRAVENOUS
Status: COMPLETED | OUTPATIENT
Start: 2023-05-15 | End: 2023-05-15

## 2023-05-15 RX ADMIN — HYDRALAZINE HYDROCHLORIDE 10 MG: 20 INJECTION INTRAMUSCULAR; INTRAVENOUS at 10:05

## 2023-05-16 ENCOUNTER — CLINICAL SUPPORT (OUTPATIENT)
Dept: CARDIOLOGY | Facility: HOSPITAL | Age: 33
End: 2023-05-16
Attending: INTERNAL MEDICINE
Payer: MEDICAID

## 2023-05-16 VITALS — BODY MASS INDEX: 21.35 KG/M2 | WEIGHT: 116 LBS | HEIGHT: 62 IN

## 2023-05-16 PROBLEM — J18.9 MULTIFOCAL PNEUMONIA: Status: RESOLVED | Noted: 2022-07-22 | Resolved: 2023-05-16

## 2023-05-16 PROBLEM — R73.9 ACUTE HYPERGLYCEMIA: Status: RESOLVED | Noted: 2022-10-26 | Resolved: 2023-05-16

## 2023-05-16 PROBLEM — R10.30 LOWER ABDOMINAL PAIN: Status: RESOLVED | Noted: 2023-01-24 | Resolved: 2023-05-16

## 2023-05-16 PROBLEM — R10.30 LOWER ABDOMINAL PAIN: Status: ACTIVE | Noted: 2023-01-24

## 2023-05-16 LAB
ALBUMIN SERPL BCP-MCNC: 2.9 G/DL (ref 3.5–5.2)
ALP SERPL-CCNC: 166 U/L (ref 55–135)
ALT SERPL W/O P-5'-P-CCNC: 19 U/L (ref 10–44)
ANION GAP SERPL CALC-SCNC: 8 MMOL/L (ref 8–16)
AST SERPL-CCNC: 19 U/L (ref 10–40)
BASOPHILS # BLD AUTO: 0.03 K/UL (ref 0–0.2)
BASOPHILS NFR BLD: 0.6 % (ref 0–1.9)
BILIRUB SERPL-MCNC: 1.5 MG/DL (ref 0.1–1)
BSA FOR ECHO PROCEDURE: 1.52 M2
BUN SERPL-MCNC: 32 MG/DL (ref 6–20)
CALCIUM SERPL-MCNC: 8.6 MG/DL (ref 8.7–10.5)
CHLORIDE SERPL-SCNC: 98 MMOL/L (ref 95–110)
CO2 SERPL-SCNC: 27 MMOL/L (ref 23–29)
CREAT SERPL-MCNC: 6 MG/DL (ref 0.5–1.4)
CRP SERPL-MCNC: 0.6 MG/DL
CV ECHO LV RWT: 0.68 CM
DIFFERENTIAL METHOD: ABNORMAL
ECHO LV POSTERIOR WALL: 1.53 CM (ref 0.6–1.1)
EJECTION FRACTION: 47 %
EOSINOPHIL # BLD AUTO: 0.2 K/UL (ref 0–0.5)
EOSINOPHIL NFR BLD: 3.6 % (ref 0–8)
ERYTHROCYTE [DISTWIDTH] IN BLOOD BY AUTOMATED COUNT: 17.5 % (ref 11.5–14.5)
ERYTHROCYTE [SEDIMENTATION RATE] IN BLOOD BY WESTERGREN METHOD: 48 MM/HR (ref 0–20)
EST. GFR  (NO RACE VARIABLE): 8.8 ML/MIN/1.73 M^2
FRACTIONAL SHORTENING: 23 % (ref 28–44)
GLUCOSE SERPL-MCNC: 143 MG/DL (ref 70–110)
GLUCOSE SERPL-MCNC: 210 MG/DL (ref 70–110)
GLUCOSE SERPL-MCNC: 220 MG/DL (ref 70–110)
GLUCOSE SERPL-MCNC: 296 MG/DL (ref 70–110)
GLUCOSE SERPL-MCNC: 327 MG/DL (ref 70–110)
GLUCOSE SERPL-MCNC: 335 MG/DL (ref 70–110)
GLUCOSE SERPL-MCNC: 340 MG/DL (ref 70–110)
HCT VFR BLD AUTO: 32.5 % (ref 37–48.5)
HGB BLD-MCNC: 11 G/DL (ref 12–16)
IMM GRANULOCYTES # BLD AUTO: 0.02 K/UL (ref 0–0.04)
IMM GRANULOCYTES NFR BLD AUTO: 0.4 % (ref 0–0.5)
INTERVENTRICULAR SEPTUM: 1.14 CM (ref 0.6–1.1)
LEFT INTERNAL DIMENSION IN SYSTOLE: 3.45 CM (ref 2.1–4)
LEFT VENTRICLE DIASTOLIC VOLUME INDEX: 60.79 ML/M2
LEFT VENTRICLE DIASTOLIC VOLUME: 92.4 ML
LEFT VENTRICLE MASS INDEX: 152 G/M2
LEFT VENTRICLE SYSTOLIC VOLUME INDEX: 32.3 ML/M2
LEFT VENTRICLE SYSTOLIC VOLUME: 49.1 ML
LEFT VENTRICULAR INTERNAL DIMENSION IN DIASTOLE: 4.5 CM (ref 3.5–6)
LEFT VENTRICULAR MASS: 231.46 G
LYMPHOCYTES # BLD AUTO: 1.4 K/UL (ref 1–4.8)
LYMPHOCYTES NFR BLD: 25.7 % (ref 18–48)
MAGNESIUM SERPL-MCNC: 2 MG/DL (ref 1.6–2.6)
MCH RBC QN AUTO: 29.5 PG (ref 27–31)
MCHC RBC AUTO-ENTMCNC: 33.8 G/DL (ref 32–36)
MCV RBC AUTO: 87 FL (ref 82–98)
MONOCYTES # BLD AUTO: 0.5 K/UL (ref 0.3–1)
MONOCYTES NFR BLD: 9.4 % (ref 4–15)
NEUTROPHILS # BLD AUTO: 3.2 K/UL (ref 1.8–7.7)
NEUTROPHILS NFR BLD: 60.3 % (ref 38–73)
NRBC BLD-RTO: 0 /100 WBC
PHOSPHATE SERPL-MCNC: 5 MG/DL (ref 2.7–4.5)
PISA TR MAX VEL: 2.07 M/S
PLATELET # BLD AUTO: 108 K/UL (ref 150–450)
PMV BLD AUTO: 10.6 FL (ref 9.2–12.9)
POTASSIUM SERPL-SCNC: 3.9 MMOL/L (ref 3.5–5.1)
PROCALCITONIN SERPL IA-MCNC: 0.58 NG/ML (ref 0–0.5)
PROT SERPL-MCNC: 6.6 G/DL (ref 6–8.4)
RBC # BLD AUTO: 3.73 M/UL (ref 4–5.4)
SODIUM SERPL-SCNC: 133 MMOL/L (ref 136–145)
TR MAX PG: 17 MMHG
WBC # BLD AUTO: 5.3 K/UL (ref 3.9–12.7)

## 2023-05-16 PROCEDURE — 96375 TX/PRO/DX INJ NEW DRUG ADDON: CPT | Mod: 59

## 2023-05-16 PROCEDURE — 93308 TTE F-UP OR LMTD: CPT

## 2023-05-16 PROCEDURE — 63600175 PHARM REV CODE 636 W HCPCS: Performed by: EMERGENCY MEDICINE

## 2023-05-16 PROCEDURE — 85025 COMPLETE CBC W/AUTO DIFF WBC: CPT | Performed by: NURSE PRACTITIONER

## 2023-05-16 PROCEDURE — 86140 C-REACTIVE PROTEIN: CPT | Performed by: NURSE PRACTITIONER

## 2023-05-16 PROCEDURE — 36415 COLL VENOUS BLD VENIPUNCTURE: CPT | Performed by: NURSE PRACTITIONER

## 2023-05-16 PROCEDURE — G0378 HOSPITAL OBSERVATION PER HR: HCPCS

## 2023-05-16 PROCEDURE — 99900031 HC PATIENT EDUCATION (STAT)

## 2023-05-16 PROCEDURE — 93308 TTE F-UP OR LMTD: CPT | Mod: 26,,, | Performed by: INTERNAL MEDICINE

## 2023-05-16 PROCEDURE — 94761 N-INVAS EAR/PLS OXIMETRY MLT: CPT

## 2023-05-16 PROCEDURE — 84100 ASSAY OF PHOSPHORUS: CPT | Performed by: NURSE PRACTITIONER

## 2023-05-16 PROCEDURE — 85651 RBC SED RATE NONAUTOMATED: CPT | Performed by: NURSE PRACTITIONER

## 2023-05-16 PROCEDURE — 25000003 PHARM REV CODE 250: Performed by: NURSE PRACTITIONER

## 2023-05-16 PROCEDURE — 63600175 PHARM REV CODE 636 W HCPCS: Performed by: NURSE PRACTITIONER

## 2023-05-16 PROCEDURE — 80053 COMPREHEN METABOLIC PANEL: CPT | Performed by: NURSE PRACTITIONER

## 2023-05-16 PROCEDURE — 99222 1ST HOSP IP/OBS MODERATE 55: CPT | Mod: 25,,, | Performed by: INTERNAL MEDICINE

## 2023-05-16 PROCEDURE — 93308 ECHO (CUPID ONLY): ICD-10-PCS | Mod: 26,,, | Performed by: INTERNAL MEDICINE

## 2023-05-16 PROCEDURE — 27000221 HC OXYGEN, UP TO 24 HOURS

## 2023-05-16 PROCEDURE — 96372 THER/PROPH/DIAG INJ SC/IM: CPT | Performed by: NURSE PRACTITIONER

## 2023-05-16 PROCEDURE — 99222 PR INITIAL HOSPITAL CARE,LEVL II: ICD-10-PCS | Mod: 25,,, | Performed by: INTERNAL MEDICINE

## 2023-05-16 PROCEDURE — 82962 GLUCOSE BLOOD TEST: CPT

## 2023-05-16 PROCEDURE — 94760 N-INVAS EAR/PLS OXIMETRY 1: CPT | Mod: XB

## 2023-05-16 PROCEDURE — 82947 ASSAY GLUCOSE BLOOD QUANT: CPT | Mod: 59 | Performed by: NURSE PRACTITIONER

## 2023-05-16 PROCEDURE — 94799 UNLISTED PULMONARY SVC/PX: CPT

## 2023-05-16 PROCEDURE — 83735 ASSAY OF MAGNESIUM: CPT | Performed by: NURSE PRACTITIONER

## 2023-05-16 PROCEDURE — 90935 HEMODIALYSIS ONE EVALUATION: CPT

## 2023-05-16 PROCEDURE — 84145 PROCALCITONIN (PCT): CPT | Performed by: NURSE PRACTITIONER

## 2023-05-16 PROCEDURE — 99900035 HC TECH TIME PER 15 MIN (STAT)

## 2023-05-16 RX ORDER — IBUPROFEN 200 MG
16 TABLET ORAL
Status: DISCONTINUED | OUTPATIENT
Start: 2023-05-16 | End: 2023-05-17 | Stop reason: HOSPADM

## 2023-05-16 RX ORDER — DIPHENHYDRAMINE HCL 25 MG
25 CAPSULE ORAL EVERY 6 HOURS PRN
Status: DISCONTINUED | OUTPATIENT
Start: 2023-05-16 | End: 2023-05-17 | Stop reason: HOSPADM

## 2023-05-16 RX ORDER — SUCRALFATE 1 G/10ML
1 SUSPENSION ORAL
Status: DISCONTINUED | OUTPATIENT
Start: 2023-05-16 | End: 2023-05-17 | Stop reason: HOSPADM

## 2023-05-16 RX ORDER — HYDRALAZINE HYDROCHLORIDE 25 MG/1
100 TABLET, FILM COATED ORAL EVERY 8 HOURS
Status: DISCONTINUED | OUTPATIENT
Start: 2023-05-16 | End: 2023-05-17 | Stop reason: HOSPADM

## 2023-05-16 RX ORDER — POLYETHYLENE GLYCOL 3350 17 G/17G
17 POWDER, FOR SOLUTION ORAL DAILY
Status: DISCONTINUED | OUTPATIENT
Start: 2023-05-16 | End: 2023-05-17 | Stop reason: HOSPADM

## 2023-05-16 RX ORDER — NALOXONE HCL 0.4 MG/ML
0.02 VIAL (ML) INJECTION
Status: DISCONTINUED | OUTPATIENT
Start: 2023-05-16 | End: 2023-05-17 | Stop reason: HOSPADM

## 2023-05-16 RX ORDER — GABAPENTIN 100 MG/1
100 CAPSULE ORAL 3 TIMES DAILY
Status: DISCONTINUED | OUTPATIENT
Start: 2023-05-16 | End: 2023-05-17 | Stop reason: HOSPADM

## 2023-05-16 RX ORDER — PANTOPRAZOLE SODIUM 40 MG/1
40 TABLET, DELAYED RELEASE ORAL
Status: DISCONTINUED | OUTPATIENT
Start: 2023-05-16 | End: 2023-05-17 | Stop reason: HOSPADM

## 2023-05-16 RX ORDER — CARVEDILOL 25 MG/1
25 TABLET ORAL 2 TIMES DAILY
Status: DISCONTINUED | OUTPATIENT
Start: 2023-05-16 | End: 2023-05-17 | Stop reason: HOSPADM

## 2023-05-16 RX ORDER — IBUPROFEN 200 MG
24 TABLET ORAL
Status: DISCONTINUED | OUTPATIENT
Start: 2023-05-16 | End: 2023-05-17 | Stop reason: HOSPADM

## 2023-05-16 RX ORDER — IPRATROPIUM BROMIDE AND ALBUTEROL SULFATE 2.5; .5 MG/3ML; MG/3ML
3 SOLUTION RESPIRATORY (INHALATION) EVERY 6 HOURS PRN
Status: DISCONTINUED | OUTPATIENT
Start: 2023-05-16 | End: 2023-05-17 | Stop reason: HOSPADM

## 2023-05-16 RX ORDER — ONDANSETRON 2 MG/ML
4 INJECTION INTRAMUSCULAR; INTRAVENOUS EVERY 8 HOURS PRN
Status: DISCONTINUED | OUTPATIENT
Start: 2023-05-16 | End: 2023-05-17 | Stop reason: HOSPADM

## 2023-05-16 RX ORDER — FLUOXETINE HYDROCHLORIDE 20 MG/1
20 CAPSULE ORAL DAILY
Status: DISCONTINUED | OUTPATIENT
Start: 2023-05-16 | End: 2023-05-17 | Stop reason: HOSPADM

## 2023-05-16 RX ORDER — SODIUM CHLORIDE 0.9 % (FLUSH) 0.9 %
10 SYRINGE (ML) INJECTION EVERY 12 HOURS PRN
Status: DISCONTINUED | OUTPATIENT
Start: 2023-05-16 | End: 2023-05-17 | Stop reason: HOSPADM

## 2023-05-16 RX ORDER — GLUCAGON 1 MG
1 KIT INJECTION
Status: DISCONTINUED | OUTPATIENT
Start: 2023-05-16 | End: 2023-05-17 | Stop reason: HOSPADM

## 2023-05-16 RX ORDER — INSULIN ASPART 100 [IU]/ML
0-5 INJECTION, SOLUTION INTRAVENOUS; SUBCUTANEOUS
Status: DISCONTINUED | OUTPATIENT
Start: 2023-05-16 | End: 2023-05-17 | Stop reason: HOSPADM

## 2023-05-16 RX ORDER — DICYCLOMINE HYDROCHLORIDE 10 MG/1
10 CAPSULE ORAL 4 TIMES DAILY PRN
Status: DISCONTINUED | OUTPATIENT
Start: 2023-05-16 | End: 2023-05-17 | Stop reason: HOSPADM

## 2023-05-16 RX ORDER — AMLODIPINE BESYLATE 5 MG/1
10 TABLET ORAL DAILY
Status: DISCONTINUED | OUTPATIENT
Start: 2023-05-16 | End: 2023-05-17 | Stop reason: HOSPADM

## 2023-05-16 RX ORDER — ACETAMINOPHEN 325 MG/1
650 TABLET ORAL EVERY 4 HOURS PRN
Status: DISCONTINUED | OUTPATIENT
Start: 2023-05-16 | End: 2023-05-17 | Stop reason: HOSPADM

## 2023-05-16 RX ORDER — LEVOFLOXACIN 5 MG/ML
750 INJECTION, SOLUTION INTRAVENOUS
Status: DISCONTINUED | OUTPATIENT
Start: 2023-05-16 | End: 2023-05-16

## 2023-05-16 RX ORDER — LEVETIRACETAM 500 MG/1
500 TABLET ORAL 2 TIMES DAILY
Status: DISCONTINUED | OUTPATIENT
Start: 2023-05-16 | End: 2023-05-17 | Stop reason: HOSPADM

## 2023-05-16 RX ADMIN — CARVEDILOL 25 MG: 25 TABLET, FILM COATED ORAL at 09:05

## 2023-05-16 RX ADMIN — INSULIN ASPART 2 UNITS: 100 INJECTION, SOLUTION INTRAVENOUS; SUBCUTANEOUS at 09:05

## 2023-05-16 RX ADMIN — APIXABAN 5 MG: 5 TABLET, FILM COATED ORAL at 09:05

## 2023-05-16 RX ADMIN — ACETAMINOPHEN 650 MG: 325 TABLET ORAL at 10:05

## 2023-05-16 RX ADMIN — APIXABAN 5 MG: 5 TABLET, FILM COATED ORAL at 08:05

## 2023-05-16 RX ADMIN — CARVEDILOL 25 MG: 25 TABLET, FILM COATED ORAL at 08:05

## 2023-05-16 RX ADMIN — ACETAMINOPHEN 650 MG: 325 TABLET ORAL at 02:05

## 2023-05-16 RX ADMIN — HYDRALAZINE HYDROCHLORIDE 100 MG: 25 TABLET ORAL at 03:05

## 2023-05-16 RX ADMIN — HYDRALAZINE HYDROCHLORIDE 100 MG: 25 TABLET ORAL at 06:05

## 2023-05-16 RX ADMIN — SUCRALFATE 1 G: 1 SUSPENSION ORAL at 02:05

## 2023-05-16 RX ADMIN — SUCRALFATE 1 G: 1 SUSPENSION ORAL at 05:05

## 2023-05-16 RX ADMIN — PANTOPRAZOLE SODIUM 40 MG: 40 TABLET, DELAYED RELEASE ORAL at 06:05

## 2023-05-16 RX ADMIN — SUCRALFATE 1 G: 1 SUSPENSION ORAL at 08:05

## 2023-05-16 RX ADMIN — LEVETIRACETAM 500 MG: 500 TABLET, FILM COATED ORAL at 08:05

## 2023-05-16 RX ADMIN — ISOSORBIDE MONONITRATE 90 MG: 60 TABLET, EXTENDED RELEASE ORAL at 09:05

## 2023-05-16 RX ADMIN — DICYCLOMINE HYDROCHLORIDE 10 MG: 10 CAPSULE ORAL at 08:05

## 2023-05-16 RX ADMIN — INSULIN ASPART 2 UNITS: 100 INJECTION, SOLUTION INTRAVENOUS; SUBCUTANEOUS at 04:05

## 2023-05-16 RX ADMIN — INSULIN DETEMIR 6 UNITS: 100 INJECTION, SOLUTION SUBCUTANEOUS at 09:05

## 2023-05-16 RX ADMIN — AMLODIPINE BESYLATE 10 MG: 5 TABLET ORAL at 09:05

## 2023-05-16 RX ADMIN — INSULIN DETEMIR 6 UNITS: 100 INJECTION, SOLUTION SUBCUTANEOUS at 08:05

## 2023-05-16 RX ADMIN — GABAPENTIN 100 MG: 100 CAPSULE ORAL at 09:05

## 2023-05-16 RX ADMIN — ONDANSETRON 4 MG: 2 INJECTION INTRAMUSCULAR; INTRAVENOUS at 08:05

## 2023-05-16 RX ADMIN — LEVETIRACETAM 500 MG: 500 TABLET, FILM COATED ORAL at 09:05

## 2023-05-16 RX ADMIN — INSULIN ASPART 3 UNITS: 100 INJECTION, SOLUTION INTRAVENOUS; SUBCUTANEOUS at 08:05

## 2023-05-16 RX ADMIN — HYDRALAZINE HYDROCHLORIDE 100 MG: 25 TABLET ORAL at 10:05

## 2023-05-16 RX ADMIN — GABAPENTIN 100 MG: 100 CAPSULE ORAL at 02:05

## 2023-05-16 RX ADMIN — HUMAN INSULIN 7 UNITS: 100 INJECTION, SOLUTION SUBCUTANEOUS at 02:05

## 2023-05-16 RX ADMIN — GABAPENTIN 100 MG: 100 CAPSULE ORAL at 08:05

## 2023-05-16 RX ADMIN — FLUOXETINE 20 MG: 20 CAPSULE ORAL at 09:05

## 2023-05-16 RX ADMIN — SUCRALFATE 1 G: 1 SUSPENSION ORAL at 06:05

## 2023-05-16 NOTE — PHARMACY MED REC
"              .      Admission Medication History     The home medication history was taken by Hali Rondon.    You may go to "Admission" then "Reconcile Home Medications" tabs to review and/or act upon these items.     The home medication list has been updated by the Pharmacy department.   Please read ALL comments highlighted in yellow.   Please address this information as you see fit.    Feel free to contact us if you have any questions or require assistance.          Medications listed below were obtained from: Patient/family  No current facility-administered medications on file prior to encounter.     Current Outpatient Medications on File Prior to Encounter   Medication Sig Dispense Refill    amLODIPine (NORVASC) 5 MG tablet Take 2 tablets (10 mg total) by mouth once daily. 30 tablet 0    apixaban (ELIQUIS) 5 mg Tab Take 1 tablet (5 mg total) by mouth 2 (two) times daily. 60 tablet 2    carvediloL (COREG) 25 MG tablet Take 1 tablet twice a day by oral route for 30 days. (Patient taking differently: Take 25 mg by mouth 2 (two) times daily.) 60 tablet 2    dicyclomine (BENTYL) 10 MG capsule Take 1 capsule (10 mg total) by mouth 4 (four) times daily as needed (abdominal pain). 20 capsule 0    FLUoxetine 20 MG capsule Take 1 capsule every day by oral route for 30 days. (Patient taking differently: Take 20 mg by mouth once daily.) 30 capsule 2    gabapentin (NEURONTIN) 100 MG capsule Take 1 capsule by mouth 3 times daily (Patient taking differently: Take 100 mg by mouth 3 (three) times daily.) 90 capsule 2    hydrALAZINE (APRESOLINE) 100 MG tablet Take 1 tablet (100 mg total) by mouth every 8 (eight) hours. 60 tablet 0    insulin aspart U-100 (NOVOLOG) 100 unit/mL (3 mL) InPn pen Inject 4 Units into the skin 3 (three) times daily with meals. 15 mL 3    insulin detemir U-100, Levemir, 100 unit/mL (3 mL) SubQ InPn pen Inject 6 Units into the skin 2 (two) times daily. 15 mL 3    isosorbide mononitrate " "(IMDUR) 30 MG 24 hr tablet Take 3 tablets (90 mg total) by mouth once daily. 90 tablet 1    levETIRAcetam (KEPPRA) 500 MG Tab Take 1 tablet twice a day by oral route for 30 days. (Patient taking differently: Take 500 mg by mouth 2 (two) times daily.) 60 tablet 2    ondansetron (ZOFRAN-ODT) 4 MG TbDL Take 1 tablet (4 mg total) by mouth every 6 (six) hours as needed. 20 tablet 0    pantoprazole (PROTONIX) 40 MG tablet Take 1 tablet every day by oral route for 30 days. (Patient taking differently: Take 40 mg by mouth once daily.) 30 tablet 2    sucralfate (CARAFATE) 100 mg/mL suspension Take 10 mLs (1 g total) by mouth 4 (four) times daily before meals and nightly. 1200 mL 0    amitriptyline (ELAVIL) 50 MG tablet Take 50 mg by mouth every evening.      pen needle, diabetic 31 gauge x 3/16" Ndle Use as directed to inject insulin 5 times daily 100 each 11    [DISCONTINUED] atenoloL (TENORMIN) 50 MG tablet Take 1 tablet (50 mg total) by mouth every other day. 45 tablet 3    [DISCONTINUED] omeprazole (PRILOSEC) 20 MG capsule Take 2 capsules (40 mg total) by mouth once daily. for 10 days 20 capsule 0         Hali Rondon  EXT 1924      "

## 2023-05-16 NOTE — NURSING
Patient educated on importance of mepilex dressing on sacrum to prevent further breakdown. Patient verbalized understanding: however, refuses at this time. Mepilex placed at bedside.

## 2023-05-16 NOTE — HOSPITAL COURSE
"05/16  Assumed care. Chart reviewed. Labs reviewed and noted below: no leukocytosis with minimal normocytic anemia; trivial hyponatremia with end stage renal dysfunction; procalcitonin very minimally elevated. Patient seen on dialysis unit. Abdominal discomfort has improved. Feels "Better". No CP/SOB    05/17  Patient feeling "Well this AM". Discussd "Pericardial Effusion" with Cardiology: is chronic and not evolving: no further work-up currently, except Hematology opinion about amyloidosis. No CP/SOB. Abdominal pain has improved. Discussed diabetic neuropathy with patient (who has retinal damage, ESRD and peripheral neuropathy already)--that this may very well be the etiology of her recurring abdominal pain, which waxes and wanes for patient, and if so, very little can be done.  VSS  Lung: decreased entry without adventitious sounds  Heart S1S2  Abdo soft  Imp ESRD, Diabetic Neuropathy, Chronic Pericardial Effusion; Possible amyloidosis  Plan discharge on preadmit regimen as per discharge med rec; cardiac/DM/renal diet; act as tolerated; Heme opinion; continue RRT scheduled as outpatient  "

## 2023-05-16 NOTE — SUBJECTIVE & OBJECTIVE
Interval History: improving abdominal pain    Review of Systems   Constitutional:  Positive for fatigue.   HENT: Negative.     Eyes: Negative.    Respiratory: Negative.     Cardiovascular: Negative.    Gastrointestinal: Negative.    Endocrine: Negative.    Genitourinary: Negative.    Musculoskeletal: Negative.    Skin: Negative.    Allergic/Immunologic: Negative.    Neurological: Negative.    Hematological: Negative.    All other systems reviewed and are negative.  Objective:     Vital Signs (Most Recent):  Temp: 98 °F (36.7 °C) (05/16/23 1055)  Pulse: 72 (05/16/23 1200)  Resp: 16 (05/16/23 1055)  BP: (!) 90/54 (05/16/23 1200)  SpO2: 95 % (05/16/23 1055) Vital Signs (24h Range):  Temp:  [97.9 °F (36.6 °C)-98.7 °F (37.1 °C)] 98 °F (36.7 °C)  Pulse:  [72-91] 72  Resp:  [16-17] 16  SpO2:  [79 %-100 %] 95 %  BP: ()/() 90/54     Weight: 52.7 kg (116 lb 2.9 oz)  Body mass index is 21.25 kg/m².    Intake/Output Summary (Last 24 hours) at 5/16/2023 1351  Last data filed at 5/16/2023 0946  Gross per 24 hour   Intake 240 ml   Output --   Net 240 ml         Physical Exam  Vitals and nursing note reviewed.   Constitutional:       Appearance: She is well-developed.   HENT:      Head: Normocephalic and atraumatic.      Right Ear: External ear normal.      Left Ear: External ear normal.      Nose: Nose normal.   Eyes:      Conjunctiva/sclera: Conjunctivae normal.      Pupils: Pupils are equal, round, and reactive to light.   Cardiovascular:      Rate and Rhythm: Normal rate and regular rhythm.      Heart sounds: Normal heart sounds.   Pulmonary:      Effort: Pulmonary effort is normal.      Breath sounds: Normal breath sounds.   Abdominal:      General: Bowel sounds are normal.      Palpations: Abdomen is soft.   Musculoskeletal:         General: Normal range of motion.      Cervical back: Normal range of motion and neck supple.   Skin:     General: Skin is warm and dry.      Capillary Refill: Capillary refill takes  less than 2 seconds.   Neurological:      Mental Status: She is alert and oriented to person, place, and time.   Psychiatric:         Behavior: Behavior normal.         Thought Content: Thought content normal.         Judgment: Judgment normal.           Significant Labs: All pertinent labs within the past 24 hours have been reviewed.  BMP:   Recent Labs   Lab 05/16/23  0516   *   *   K 3.9   CL 98   CO2 27   BUN 32*   CREATININE 6.0*   CALCIUM 8.6*   MG 2.0     CBC:   Recent Labs   Lab 05/15/23  2104 05/16/23  0516   WBC 5.47 5.30   HGB 9.7* 11.0*   HCT 27.6* 32.5*   PLT 85* 108*       Significant Imaging: I have reviewed all pertinent imaging results/findings within the past 24 hours.

## 2023-05-16 NOTE — NURSING
Consent and hep B status verified, removed 0uf net, pt even, due to low BP. No issues with catheter, dressing changed, CDI. Patient stable throughout treatment Educated pt on HD treatment. Report given to jurgen Johnson nurse      05/16/23 1408   Handoff Report   Received From Eun   Given To Elizabeth   Vital Signs   Temp 98 °F (36.7 °C)   Temp Source Oral   Pulse 72   Heart Rate Source Monitor   Resp 16   SpO2 97 %   Pulse Oximetry Type Intermittent   Flow (L/min) 4   Device (Oxygen Therapy) nasal cannula   /75   BP Location Left arm   BP Method Automatic   Patient Position Lying   Orthostatic VS No   Assessments (Pre/Post)   Consent Obtained yes   Safety vein preservation armband present   Date Hepatitis Profile Obtained 02/07/23   Blood Liters Processed (BLP) 66.5   Transport Modality bed   Level of Consciousness (AVPU) alert   Dialyzer Clearance mildly streaked   Pain   Preferred Pain Scale number (Numeric Rating Pain Scale)   Comfort/Acceptable Pain Level 0   Pain Rating (0-10): Rest 0   Pain Rating (0-10): Activity 0   Pain Management Interventions quiet environment facilitated;position adjusted;pillow support provided   Pain/Comfort Interventions   Fever Reduction/Comfort Measures lightweight bedding;lightweight clothing   Pre-Hemodialysis Assessment   Additional Dialysis Information Needed Yes   Patient Status Departed   Treatment Consent Verified Yes   Treatment Status Completed        Hemodialysis Catheter 12/09/22 right subclavian   Placement Date: 12/09/22   Location: right subclavian   Line Necessity Review CRRT/HD   Site Assessment No drainage;No redness;No swelling;No warmth   Line Securement Device Secured with sutures   Dressing Type CHG impregnated dressing/sponge   Dressing Status Clean;Dry;Intact   Dressing Intervention Integrity maintained   Date on Dressing 05/16/23   Dressing Due to be Changed 05/23/23   Venous Patency/Care flushed w/o difficulty;heparin locked   Arterial  Patency/Care flushed w/o difficulty;heparin locked   Post-Hemodialysis Assessment   Rinseback Volume (mL) 250 mL   Blood Volume Processed (Liters) 66.5 L   Dialyzer Clearance Lightly streaked   Duration of Treatment 180 minutes   Additional Fluid Intake (mL) 0 mL   Total UF (mL) 500 mL   Net Fluid Removal 0   Patient Response to Treatment tolerated well   Post-Treatment Weight 52.7 kg (116 lb 2.9 oz)   Treatment Weight Change 0   Post-Hemodialysis Comments vss, nad

## 2023-05-16 NOTE — CONSULTS
INPATIENT NEPHROLOGY CONSULT   [facility]    Patient Name: Tabby Howard  Date: 05/16/2023    Reason for consultation: ESRD    Chief Complaint:   Chief Complaint   Patient presents with    Abdominal Pain     Pt states starting this am mid to lower back pain now radiating into her lower abdomen.  Pt recently dc from hospital after blood transfusion 2 days. Dialysis pt, Tuesday/Thursday/Saturday. Last dialysis Thursday.     Back Pain       History of Present Illness:  ESRD patient on TTS who presents to the emergency room, via EMS, for evaluation of severe lower abdominal pain that started today with associated nausea and NBNB emesis. Patient recent hospital admission to [facility] (05/11/2023 through 05/13/2023) treated for symptomatic anemia and was dialyzed during that time on 05/11/2023.  Patient was discharged 05/13/2023 which is a dialysis day and missed dialysis. Getting treatment for possible UTI. Consulted for dialysis.    Interval History:  5/16- seen on HD, low BP- minimal UF     Plan of Care:    Assessment:  ESRD on HD TTS  HTN/D CHF/Chronic pericardial effusion  SHPT  Anemia of CKD    Plan:    - HD TTS  - resume home BP meds  - UF 2-3L  - renal diet, 1.5L fluid restriction  - not on binders  - no acute SHELLEY needs    Thank you for allowing us to participate in this patient's care. We will continue to follow.    Vital Signs:  Temp Readings from Last 3 Encounters:   05/16/23 97.9 °F (36.6 °C) (Oral)   05/12/23 98.3 °F (36.8 °C) (Oral)   05/03/23 97.7 °F (36.5 °C) (Oral)       Pulse Readings from Last 3 Encounters:   05/16/23 84   05/12/23 74   05/03/23 99       BP Readings from Last 3 Encounters:   05/16/23 (!) 108/55   05/12/23 104/64   05/03/23 (!) 180/98       Weight:  Wt Readings from Last 3 Encounters:   05/16/23 52.7 kg (116 lb 2.9 oz)   05/12/23 48.7 kg (107 lb 5.8 oz)   05/03/23 54 kg (119 lb)       Past Medical & Surgical History:  Past Medical History:   Diagnosis  Date    ESRD on hemodialysis 2022    Gastritis 2022    EGD was 22    Gastroparesis 2022    has not had Emptying study    Heart failure with preserved ejection fraction 2022    EF 55% on 3/22    History of supraventricular tachycardia     Hyperkalemia 2022    Hypertensive emergency 2022    Sickle cell trait 2022    Type 2 diabetes mellitus        Past Surgical History:   Procedure Laterality Date     SECTION      x 3    COLONOSCOPY      COLONOSCOPY N/A 2022    Procedure: COLONOSCOPY;  Surgeon: Jagdeep Cedeno MD;  Location: [facility];  Service: Endoscopy;  Laterality: N/A;    ESOPHAGOGASTRODUODENOSCOPY N/A 10/18/2019    Procedure: ESOPHAGOGASTRODUODENOSCOPY (EGD);  Surgeon: Gianluca Mendez MD;  Location: [facility];  Service: Endoscopy;  Laterality: N/A;    ESOPHAGOGASTRODUODENOSCOPY N/A 2022    Procedure: EGD (ESOPHAGOGASTRODUODENOSCOPY);  Surgeon: Micky Paredes III, MD;  Location: [facility];  Service: Endoscopy;  Laterality: N/A;    ESOPHAGOGASTRODUODENOSCOPY N/A 2022    Procedure: EGD (ESOPHAGOGASTRODUODENOSCOPY);  Surgeon: Marcelo Zhong MD;  Location: [facility];  Service: Endoscopy;  Laterality: N/A;    LAPAROSCOPIC CHOLECYSTECTOMY N/A 2022    Procedure: CHOLECYSTECTOMY, LAPAROSCOPIC;  Surgeon: Grey Perez MD;  Location: [facility];  Service: General;  Laterality: N/A;    PLACEMENT OF DUAL-LUMEN VASCULAR CATHETER Left 2022    Procedure: INSERTION-CATHETER-JOSEPH;  Surgeon: Dionte Gan MD;  Location: [facility] OR;  Service: General;  Laterality: Left;    PLACEMENT OF DUAL-LUMEN VASCULAR CATHETER Right 2022    Procedure: INSERTION-CATHETER-Hemosplit;  Surgeon: Dionte Gan MD;  Location: [facility] OR;  Service: General;  Laterality: Right;       Past Social History:  Social History     Socioeconomic History    Marital status:    Tobacco Use    Smoking status: Never    Smokeless tobacco: Never   Substance and  Sexual Activity    Alcohol use: Not Currently    Drug use: No    Sexual activity: Not Currently     Partners: Male     Birth control/protection: I.U.D.     Social Determinants of Health     Financial Resource Strain: Unknown    Difficulty of Paying Living Expenses: Patient refused   Food Insecurity: Unknown    Worried About Running Out of Food in the Last Year: Patient refused    Ran Out of Food in the Last Year: Patient refused   Transportation Needs: Unknown    Lack of Transportation (Medical): Patient refused    Lack of Transportation (Non-Medical): Patient refused   Physical Activity: Unknown    Days of Exercise per Week: Patient refused    Minutes of Exercise per Session: Patient refused   Stress: Unknown    Feeling of Stress : Patient refused   Social Connections: Unknown    Frequency of Communication with Friends and Family: Patient refused    Frequency of Social Gatherings with Friends and Family: Patient refused    Attends Bahai Services: Patient refused    Active Member of Clubs or Organizations: Patient refused    Attends Club or Organization Meetings: Patient refused    Marital Status: Patient refused   Housing Stability: Unknown    Unable to Pay for Housing in the Last Year: Patient refused    Unstable Housing in the Last Year: Patient refused       Medications:  Scheduled Meds:   amLODIPine  10 mg Oral Daily    apixaban  5 mg Oral BID    carvediloL  25 mg Oral BID    FLUoxetine  20 mg Oral Daily    gabapentin  100 mg Oral TID    hydrALAZINE  100 mg Oral Q8H    insulin detemir U-100 (Levemir)  6 Units Subcutaneous BID    isosorbide mononitrate  90 mg Oral Daily    levETIRAcetam  500 mg Oral BID    pantoprazole  40 mg Oral Before breakfast    polyethylene glycol  17 g Oral Daily    sucralfate  1 g Oral QID (AC & HS)     Continuous Infusions:  PRN Meds:.acetaminophen, albuterol-ipratropium, dextrose 50%, dextrose 50%, dicyclomine, glucagon (human recombinant), glucose, glucose, insulin aspart U-100,  naloxone, ondansetron, sodium chloride 0.9%  No current facility-administered medications on file prior to encounter.     Current Outpatient Medications on File Prior to Encounter   Medication Sig Dispense Refill    amLODIPine (NORVASC) 5 MG tablet Take 2 tablets (10 mg total) by mouth once daily. 30 tablet 0    apixaban (ELIQUIS) 5 mg Tab Take 1 tablet (5 mg total) by mouth 2 (two) times daily. 60 tablet 2    carvediloL (COREG) 25 MG tablet Take 1 tablet twice a day by oral route for 30 days. (Patient taking differently: Take 25 mg by mouth 2 (two) times daily.) 60 tablet 2    dicyclomine (BENTYL) 10 MG capsule Take 1 capsule (10 mg total) by mouth 4 (four) times daily as needed (abdominal pain). 20 capsule 0    FLUoxetine 20 MG capsule Take 1 capsule every day by oral route for 30 days. (Patient taking differently: Take 20 mg by mouth once daily.) 30 capsule 2    gabapentin (NEURONTIN) 100 MG capsule Take 1 capsule by mouth 3 times daily (Patient taking differently: Take 100 mg by mouth 3 (three) times daily.) 90 capsule 2    hydrALAZINE (APRESOLINE) 100 MG tablet Take 1 tablet (100 mg total) by mouth every 8 (eight) hours. 60 tablet 0    insulin aspart U-100 (NOVOLOG) 100 unit/mL (3 mL) InPn pen Inject 4 Units into the skin 3 (three) times daily with meals. 15 mL 3    insulin detemir U-100, Levemir, 100 unit/mL (3 mL) SubQ InPn pen Inject 6 Units into the skin 2 (two) times daily. 15 mL 3    isosorbide mononitrate (IMDUR) 30 MG 24 hr tablet Take 3 tablets (90 mg total) by mouth once daily. 90 tablet 1    levETIRAcetam (KEPPRA) 500 MG Tab Take 1 tablet twice a day by oral route for 30 days. (Patient taking differently: Take 500 mg by mouth 2 (two) times daily.) 60 tablet 2    ondansetron (ZOFRAN-ODT) 4 MG TbDL Take 1 tablet (4 mg total) by mouth every 6 (six) hours as needed. 20 tablet 0    pantoprazole (PROTONIX) 40 MG tablet Take 1 tablet every day by oral route for 30 days. (Patient taking differently: Take 40  "mg by mouth once daily.) 30 tablet 2    sucralfate (CARAFATE) 100 mg/mL suspension Take 10 mLs (1 g total) by mouth 4 (four) times daily before meals and nightly. 1200 mL 0    amitriptyline (ELAVIL) 50 MG tablet Take 50 mg by mouth every evening.      pen needle, diabetic 31 gauge x 3/16" Ndle Use as directed to inject insulin 5 times daily 100 each 11    [DISCONTINUED] atenoloL (TENORMIN) 50 MG tablet Take 1 tablet (50 mg total) by mouth every other day. 45 tablet 3    [DISCONTINUED] omeprazole (PRILOSEC) 20 MG capsule Take 2 capsules (40 mg total) by mouth once daily. for 10 days 20 capsule 0       Allergies:  Penicillins    Past Family History:  Reviewed; refer to Hospitalist Admission Note    Review of Systems:  Review of Systems - All 14 systems reviewed and negative, except as noted in HPI    Physical Exam:  General Appearance:    NAD, AAO x 3, cooperative, appears stated age   Head:    Normocephalic, atraumatic   Eyes:    PER, EOMI, and conjunctiva/sclera clear bilaterally       Mouth:   Moist mucus membranes, no thrush or oral lesions,       normal dentition   Back:     No CVA tenderness   Lungs:     Clear to auscultation bilaterally, no wheezes, crackles,           rales or rhonchi, symmetric air movement, respirations unlabored   Chest wall:    No tenderness or deformity   Heart:    Regular rate and rhythm, S1 and S2 normal, no murmur, rub   or gallop   Abdomen:     Soft, non-tender, non-distended, bowel sounds active all four   quadrants, no RT or guarding, no masses, no organomegaly   Extremities:   Warm and well perfused, distal pulses are intact, no             cyanosis or peripheral edema   MSK:   No joint or muscle swelling, tenderness or deformity   Skin:   Skin color, texture, turgor normal, no rashes or lesions   Neurologic/Psychiatric:   CNII-XII intact, normal strength and sensation       throughout, no asterixis; normal affect, memory, judgement     and insight      Results:  Lab Results "   Component Value Date     (L) 05/16/2023    K 3.9 05/16/2023    CL 98 05/16/2023    CO2 27 05/16/2023    BUN 32 (H) 05/16/2023    CREATININE 6.0 (H) 05/16/2023    CALCIUM 8.6 (L) 05/16/2023    ANIONGAP 8 05/16/2023    ESTGFRAFRICA 20 (A) 07/31/2022    EGFRNONAA 18 (A) 07/31/2022       Lab Results   Component Value Date    CALCIUM 8.6 (L) 05/16/2023    PHOS 5.0 (H) 05/16/2023       Recent Labs   Lab 05/16/23  0516   WBC 5.30   RBC 3.73*   HGB 11.0*   HCT 32.5*   *   MCV 87   MCH 29.5   MCHC 33.8       I have personally reviewed pertinent radiological imaging and reports.    I have spent > 70 minutes providing care for this patient for the above diagnoses. These services have included chart/data/imaging review, evaluation, exam, formulation of plan, , note preparation, and discussions with staff involved in this patient's care.    Prateek Clark MD MPH  [facility]  [address] [place] [id number]  [phone] (p)  [phone] (f)

## 2023-05-16 NOTE — CONSULTS
[facility]  Department of Cardiology  Consult Note      PATIENT NAME: Tabby Howard  MRN: [id number]  TODAY'S DATE: 05/16/2023  ADMIT DATE: 5/15/2023                          CONSULT REQUESTED BY: Roby Neves MD    SUBJECTIVE     PRINCIPAL PROBLEM: Lower abdominal pain      REASON FOR CONSULT:    Chronic Pericardial Effusion      HPI:    Patient is a 33-year-old female with past medical history of ESRD on HD Tuesday/Thursday/Saturday, gastritis, gastroparesis, pericardial effusion, heart failure with preserved EF, history of SVT, hyperkalemia, hypertension, sickle cell trait, diabetes mellitus who presented to the ED with complaints of abdominal pain, nausea, and vomiting that started yesterday morning and radiated to the mid lower back.  Patient was recently seen in hospital on the 11th where she was given 2 units of packed red blood cells for acute anemia and emergently dialyzed.  Patient was discharged on the 13th and missed dialysis. In ED, CT scan of abdomen and pelvis was performed that showed possible cystitis and focal consolidation within the right lung base, pneumonia not excluded, persistent moderate pericardial effusion as on prior, mild generalized anasarca.    Hemodialysis today.  Patient continues to complain of ABD pain, now more in LUQ.  No edema. Hemodynamically stable. NAD. A&OX4      H&H 9.7/27.6, platelets 85, K 4.3, creatinine 5.6, BUN 30, procalcitonin 0.58, sed rate 48; CXR moderate cardiopericardial silhouette enlargement with no acute pulmonary process identified.  EKG normal sinus rhythm, left axis deviation, prolonged QT.    ECHO TODAY    The left ventricle is normal in size with moderate concentric hypertrophy and mildly decreased systolic function.  The estimated ejection fraction is 47%.  Left ventricular diastolic dysfunction.  There is mild left ventricular global hypokinesis.  Normal right ventricular size with normal right ventricular systolic function.  Small  circumferential with predominance of posterolateral pericardial effusion. Posterior effusion measuring 1.18 cm.     CT abdomen pelvis  IMPRESSION:  1.  Circumferential bladder wall thickening. Correlation for possible cystitis could be done.  2.  Slight focal consolidation within the right lung base, and pneumonia is not excluded.  3.  Persistent moderate pericardial effusion, as on prior.  4.  Mild hepatomegaly.  5.  Slight diffuse edema within the omentum, nonspecific.  6.  Mild generalized anasarca.       FROM H&P       HPI: Tabby Howard is a 33 y.o. female with a history as  has a past medical history of ESRD on hemodialysis (04/12/2022), Gastritis (12/2022), Gastroparesis (04/12/2022), Heart failure with preserved ejection fraction (04/12/2022), History of supraventricular tachycardia, Hyperkalemia (07/07/2022), Hypertensive emergency (07/08/2022), Sickle cell trait (04/12/2022), and Type 2 diabetes mellitus. who presented to the ED with a Abdominal Pain (Pt states starting this am mid to lower back pain now radiating into her lower abdomen.  Pt recently dc from hospital after blood transfusion 2 days. Dialysis pt, Tuesday/Thursday/Saturday. Last dialysis Thursday. ) and Back Pain     Patient presents to the emergency room, via EMS, for evaluation of severe lower abdominal pain that started today with associated nausea and NBNB emesis.  On assessment patient very sleepy however arousable and responds appropriately to questions.  Per ED provider patient was given 10 mg of morphine per EMS EN route, which is likely contributing to her drowsiness.  Patient end-stage renal disease with HD Tuesday Thursday and Saturdays.       Patient recent hospital admission to [facility] (05/11/2023 through 05/13/2023) treated for symptomatic anemia and was dialyzed during that time on 05/11/2023.  Patient was discharged 05/13/2023 which is a dialysis day and missed dialysis.      Lab and imaging obtained and  reviewed.  CBC completed and shows RBCs 3.23 H/H 9.7/27.6 RDW 17.1 platelets 85 g % 78.8 lymph% 13.2.  Chemistry profile shows sodium 133 chloride 93 BUN 30 creatinine 5.6 GFR 9.6 glucose 399 alk phos 178 albumin 3.4 total albumin 1.7.  On admit, afebrile HR 91 R 16 /91 with initial sats 86% on room air in improving to 94-97% on 3 L O2 per nasal cannula.        CT abdomen pelvis  IMPRESSION:  1.  Circumferential bladder wall thickening. Correlation for possible cystitis could be done.  2.  Slight focal consolidation within the right lung base, and pneumonia is not excluded.  3.  Persistent moderate pericardial effusion, as on prior.  4.  Mild hepatomegaly.  5.  Slight diffuse edema within the omentum, nonspecific.  6.  Mild generalized anasarca.     Recent Echo  Summary  The left ventricle is normal in size with moderate concentric hypertrophy and low normal systolic function.  The estimated ejection fraction is 50%.  Normal right ventricular size with normal right ventricular systolic function.  Intermediate central venous pressure (8 mmHg).  Small circumferential pericardial effusion. Effusion is fluid.  Patient has small-to-moderate pericardial effusion anteriorly subcostally it is approximally 1.7 cm  There is no evidence of constriction or tamponade  No ventricular interdependence  Posterior fusion smaller than the anterior        Per ED provider patient presented to the emergency room via EMS for evaluation of abdominal pain.  EN route patient was given 2 mg of morphine and noted to have O2 sats in 80s.  She was placed on 2-3 L of oxygen per nasal cannula with improvement of oxygen saturation.  Labs reviewed in noted to be hyperglycemic treated with 7 units of regular insulin also noted to be hypertensive and given IV hydralazine 10 mg x 1.       Review of patient's allergies indicates:   Allergen Reactions    Penicillins Hives       Past Medical History:   Diagnosis Date    ESRD on hemodialysis  2022    Gastritis 2022    EGD was 21    Gastroparesis 2022    has not had Emptying study    Heart failure with preserved ejection fraction 2022    EF 55% on 3/22    History of supraventricular tachycardia     Hyperkalemia 2022    Hypertensive emergency 2022    Sickle cell trait 2022    Type 2 diabetes mellitus      Past Surgical History:   Procedure Laterality Date     SECTION      x 3    COLONOSCOPY      COLONOSCOPY N/A 2022    Procedure: COLONOSCOPY;  Surgeon: Jagdeep Cedeno MD;  Location: [facility];  Service: Endoscopy;  Laterality: N/A;    ESOPHAGOGASTRODUODENOSCOPY N/A 10/18/2019    Procedure: ESOPHAGOGASTRODUODENOSCOPY (EGD);  Surgeon: Gianluca Mendez MD;  Location: [facility];  Service: Endoscopy;  Laterality: N/A;    ESOPHAGOGASTRODUODENOSCOPY N/A 2022    Procedure: EGD (ESOPHAGOGASTRODUODENOSCOPY);  Surgeon: Micky Paredes III, MD;  Location: [facility];  Service: Endoscopy;  Laterality: N/A;    ESOPHAGOGASTRODUODENOSCOPY N/A 2022    Procedure: EGD (ESOPHAGOGASTRODUODENOSCOPY);  Surgeon: Marcelo Zhong MD;  Location: [facility];  Service: Endoscopy;  Laterality: N/A;    LAPAROSCOPIC CHOLECYSTECTOMY N/A 2022    Procedure: CHOLECYSTECTOMY, LAPAROSCOPIC;  Surgeon: Grey Perez MD;  [facility];  Service: General;  Laterality: N/A;    PLACEMENT OF DUAL-LUMEN VASCULAR CATHETER Left 2022    Procedure: INSERTION-CATHETER-JOSEPH;  Surgeon: Dionte Gan MD;  [facility];  Service: General;  Laterality: Left;    PLACEMENT OF DUAL-LUMEN VASCULAR CATHETER Right 2022    Procedure: INSERTION-CATHETER-Hemosplit;  Surgeon: Dionte Gan MD;  Location: [facility] OR;  Service: General;  Laterality: Right;     Social History     Tobacco Use    Smoking status: Never    Smokeless tobacco: Never   Substance Use Topics    Alcohol use: Not Currently    Drug use: No        REVIEW OF SYSTEMS  CONSTITUTIONAL: Negative for  chills, fatigue and fever.   EYES: No double vision, No blurred vision  NEURO: No headaches, No dizziness  RESPIRATORY: Negative for cough, shortness of breath and wheezing.    CARDIOVASCULAR: Negative for chest pain. Negative for palpitations and leg swelling.   GI: + for abdominal pain, nausea and vomiting.   : Negative for dysuria and frequency, Negative for hematuria  SKIN: Negative for bruising, Negative for edema or discoloration noted.   ENDOCRINE: Negative for polyphagia, Negative for heat intolerance, Negative for cold intolerance  PSYCHIATRIC: Negative for depression, Negative for anxiety, Negative for memory loss  MUSCULOSKELETAL: Negative for neck pain, Negative for muscle weakness, Negative for back pain     OBJECTIVE     VITAL SIGNS (Most Recent)  Temp: 97.9 °F (36.6 °C) (05/16/23 0738)  Pulse: 84 (05/16/23 0738)  Resp: 17 (05/16/23 0738)  BP: (!) 108/55 (05/16/23 0738)  SpO2: 98 % (05/16/23 0738)    VENTILATION STATUS  Resp: 17 (05/16/23 0738)  SpO2: 98 % (05/16/23 0738)           I & O (Last 24H):No intake or output data in the 24 hours ending 05/16/23 0831    WEIGHTS  Wt Readings from Last 3 Encounters:   05/16/23 0408 52.7 kg (116 lb 2.9 oz)   05/15/23 2040 54 kg (119 lb)   05/12/23 0522 48.7 kg (107 lb 5.8 oz)   05/11/23 2015 48.7 kg (107 lb 5.8 oz)   05/11/23 1138 54 kg (119 lb)   05/03/23 0850 54 kg (119 lb)       PHYSICAL EXAM  GENERAL: Chronically ill appearing female,in no apparent distress alert and oriented.   HEENT: Normocephalic. Pupils normal and conjunctivae normal.   NECK: No JVD. No bruit..   THYROID: Thyroid not examined   CARDIAC: Regular rate and rhythm. S1 is normal.S2 is normal.Soft systolic murmur  CHEST ANATOMY: normal.   LUNGS: Clear to auscultation. No wheezing or rhonchi..   ABDOMEN: Soft  Normal bowel sounds. Left upper quadrant tender to palpation questionable splenomegaly  URINARY: No [place] catheter   EXTREMITIES: No cyanosis, clubbing or edema noted at this  time.  PERIPHERAL VASCULAR SYSTEM: Good palpable distal pulses.   CENTRAL NERVOUS SYSTEM: No focal motor or sensory deficits noted.   SKIN: Skin without lesions, moist, well perfused.   MUSCLE STRENGTH & TONE: No noteable weakness, atrophy or abnormal movement.     HOME MEDICATIONS:  No current facility-administered medications on file prior to encounter.     Current Outpatient Medications on File Prior to Encounter   Medication Sig Dispense Refill    amLODIPine (NORVASC) 5 MG tablet Take 2 tablets (10 mg total) by mouth once daily. 30 tablet 0    apixaban (ELIQUIS) 5 mg Tab Take 1 tablet (5 mg total) by mouth 2 (two) times daily. 60 tablet 2    carvediloL (COREG) 25 MG tablet Take 1 tablet twice a day by oral route for 30 days. (Patient taking differently: Take 25 mg by mouth 2 (two) times daily.) 60 tablet 2    dicyclomine (BENTYL) 10 MG capsule Take 1 capsule (10 mg total) by mouth 4 (four) times daily as needed (abdominal pain). 20 capsule 0    FLUoxetine 20 MG capsule Take 1 capsule every day by oral route for 30 days. (Patient taking differently: Take 20 mg by mouth once daily.) 30 capsule 2    gabapentin (NEURONTIN) 100 MG capsule Take 1 capsule by mouth 3 times daily (Patient taking differently: Take 100 mg by mouth 3 (three) times daily.) 90 capsule 2    hydrALAZINE (APRESOLINE) 100 MG tablet Take 1 tablet (100 mg total) by mouth every 8 (eight) hours. 60 tablet 0    insulin aspart U-100 (NOVOLOG) 100 unit/mL (3 mL) InPn pen Inject 4 Units into the skin 3 (three) times daily with meals. 15 mL 3    insulin detemir U-100, Levemir, 100 unit/mL (3 mL) SubQ InPn pen Inject 6 Units into the skin 2 (two) times daily. 15 mL 3    isosorbide mononitrate (IMDUR) 30 MG 24 hr tablet Take 3 tablets (90 mg total) by mouth once daily. 90 tablet 1    levETIRAcetam (KEPPRA) 500 MG Tab Take 1 tablet twice a day by oral route for 30 days. (Patient taking differently: Take 500 mg by mouth 2 (two) times daily.) 60 tablet 2     "ondansetron (ZOFRAN-ODT) 4 MG TbDL Take 1 tablet (4 mg total) by mouth every 6 (six) hours as needed. 20 tablet 0    pantoprazole (PROTONIX) 40 MG tablet Take 1 tablet every day by oral route for 30 days. (Patient taking differently: Take 40 mg by mouth once daily.) 30 tablet 2    sucralfate (CARAFATE) 100 mg/mL suspension Take 10 mLs (1 g total) by mouth 4 (four) times daily before meals and nightly. 1200 mL 0    amitriptyline (ELAVIL) 50 MG tablet Take 50 mg by mouth every evening.      pen needle, diabetic 31 gauge x 3/16" Ndle Use as directed to inject insulin 5 times daily 100 each 11    [DISCONTINUED] atenoloL (TENORMIN) 50 MG tablet Take 1 tablet (50 mg total) by mouth every other day. 45 tablet 3    [DISCONTINUED] omeprazole (PRILOSEC) 20 MG capsule Take 2 capsules (40 mg total) by mouth once daily. for 10 days 20 capsule 0       SCHEDULED MEDS:   amLODIPine  10 mg Oral Daily    apixaban  5 mg Oral BID    carvediloL  25 mg Oral BID    FLUoxetine  20 mg Oral Daily    gabapentin  100 mg Oral TID    hydrALAZINE  100 mg Oral Q8H    insulin detemir U-100 (Levemir)  6 Units Subcutaneous BID    isosorbide mononitrate  90 mg Oral Daily    levETIRAcetam  500 mg Oral BID    pantoprazole  40 mg Oral Before breakfast    polyethylene glycol  17 g Oral Daily    sucralfate  1 g Oral QID (AC & HS)       CONTINUOUS INFUSIONS:    PRN MEDS:acetaminophen, albuterol-ipratropium, dextrose 50%, dextrose 50%, dicyclomine, glucagon (human recombinant), glucose, glucose, insulin aspart U-100, naloxone, ondansetron, sodium chloride 0.9%    LABS AND DIAGNOSTICS     CBC LAST 3 DAYS  Recent Labs   Lab 05/12/23  0419 05/15/23  2104 05/16/23  0516   WBC 5.80 5.47 5.30   RBC 3.74* 3.23* 3.73*   HGB 11.2* 9.7* 11.0*   HCT 33.4* 27.6* 32.5*   MCV 89 85 87   MCH 29.9 30.0 29.5   MCHC 33.5 35.1 33.8   RDW 17.4* 17.1* 17.5*    85* 108*   MPV 9.8 9.9 10.6   GRAN 69.3  4.0 78.8*  4.3 60.3  3.2   LYMPH 18.3  1.1 13.2*  0.7* 25.7  1.4 "   MONO 8.4  0.5 5.1  0.3 9.4  0.5   BASO 0.04 0.02 0.03   NRBC 2* 0 0       COAGULATION LAST 3 DAYS  No results for input(s): LABPT, INR, APTT in the last 168 hours.    CHEMISTRY LAST 3 DAYS  Recent Labs   Lab 05/11/23  1232 05/11/23  1401 05/12/23  0419 05/15/23  2104 05/16/23  0214 05/16/23  0516   *  --  138 133*  --  133*   K 4.3  --  4.2 4.3  --  3.9   CL 92*  --  105 93*  --  98   CO2 26  --  26 24  --  27   ANIONGAP 15  --  7* 16  --  8   BUN 41*  --  17 30*  --  32*   CREATININE 6.0*  --  3.6* 5.6*  --  6.0*   *  --  104 399* 340* 143*   CALCIUM 8.2*  --  9.1 9.1  --  8.6*   PH  --  7.386  --   --   --   --    MG 1.8  --   --   --   --  2.0   ALBUMIN 3.4*  --  3.2* 3.4*  --  2.9*   PROT 7.0  --  7.1 6.9  --  6.6   ALKPHOS 196*  --  205* 178*  --  166*   ALT 41  --  37 22  --  19   AST 32  --  29 23  --  19   BILITOT 2.1*  --  1.7* 1.7*  --  1.5*       CARDIAC PROFILE LAST 3 DAYS  Recent Labs   Lab 05/11/23  1232 05/11/23 2013   BNP 1,935*  --    TROPONINIHS  --  32.2*       ENDOCRINE LAST 3 DAYS  Recent Labs   Lab 05/16/23  0254   PROCAL 0.58*       LAST ARTERIAL BLOOD GAS  ABG  Recent Labs   Lab 05/11/23  1401   PH 7.386   PO2 26*   PCO2 47.5*   HCO3 28.5*   BE 4       LAST 7 DAYS MICROBIOLOGY   Microbiology Results (last 7 days)       ** No results found for the last 168 hours. **            MOST RECENT IMAGING  X-Ray Chest AP Portable  CLINICAL HISTORY:  33 years (1990) Female hypoxia hypoxia    TECHNIQUE:  Portable AP radiograph the chest.    COMPARISON:  None available.    FINDINGS:  The lungs are clear. Costophrenic angles are seen without effusion. No pneumothorax is identified. Moderate cardiopericardial silhouette enlargement.. There is a right-sided dual-lumen IJ central venous catheter with tip at the level of the cavoatrial junction.  Osseous structures appear within normal limits. The visualized upper abdomen is unremarkable.    IMPRESSION:  Moderate cardiopericardial  silhouette enlargement with no acute pulmonary process identified (pericardial effusion?).    .    Electronically signed by:  Deandre Shirley MD  5/16/2023 6:53 AM CDT Workstation: [id number]      ECHOCARDIOGRAM RESULTS (last 5)  Results for orders placed during the hospital encounter of 04/22/23    Echo    Interpretation Summary  · The left ventricle is normal in size with moderate concentric hypertrophy and low normal systolic function.  · The estimated ejection fraction is 50%.  · Normal right ventricular size with normal right ventricular systolic function.  · Intermediate central venous pressure (8 mmHg).  · Small circumferential pericardial effusion. Effusion is fluid.  · Patient has small-to-moderate pericardial effusion anteriorly subcostally it is approximally 1.7 cm  · There is no evidence of constriction or tamponade  · No ventricular interdependence  · Posterior fusion smaller than the anterior      Results for orders placed during the hospital encounter of 01/12/23    Echo    Interpretation Summary  · The left ventricle is normal in size with moderate concentric hypertrophy and normal systolic function.  · The estimated ejection fraction is 55%.  · Grade III left ventricular diastolic dysfunction.  · Normal right ventricular size with normal right ventricular systolic function.  · Moderate left atrial enlargement.  · Moderate to severe tricuspid regurgitation.  · Mild to moderate pulmonic regurgitation.  · Mild mitral regurgitation.  · Normal central venous pressure (3 mmHg).  · The estimated PA systolic pressure is 56 mmHg.  · There is pulmonary hypertension.  · Moderate to large circumferential pericardial effusion. No evidence of tamponade.      Results for orders placed during the hospital encounter of 11/19/22    Echo    Interpretation Summary  · The left ventricle is normal in size with concentric remodeling and normal systolic function.  · The estimated ejection fraction is 60%.  · There is  right ventricular hypertrophy.  · Normal right ventricular size.  · Small circumferential pericardial effusion. Effusion is fluid.  · There is no evidence of tamponade or constriction  · Pericardial effusions approximally 1.5 cm posteriorly and over the right heart  · No evidence of ventricular interdependence  · No right atrial or right ventricle collapse  · Fusion may be slightly smaller than seen previously; this was limited echo      Results for orders placed during the hospital encounter of 10/26/22    Echo Saline Bubble? No    Interpretation Summary  · The left ventricular global longitudinal strain is --11.45%%.  · Moderate concentric hypertrophy and moderately decreased systolic function.  · The estimated ejection fraction is 34%.  · Left ventricular diastolic dysfunction.  · Strain study demonstrates apical sparing with normal G LS at the apex a -20 and then in the midportion bases -10- 7  · Strain study is compatible with amyloid but not diagnostic of amyloid      Echo    Interpretation Summary  · The left ventricle is normal in size with moderate moderate asymmetric hypertrophy eccentric hypertrophy and normal systolic function.  · The estimated ejection fraction is 55%.  · Indeterminate left ventricular diastolic function with normal left atrial pressure.  · There are segmental left ventricular wall motion abnormalities.  · Atrial fibrillation not observed.  · Normal right ventricular size with normal right ventricular systolic function.  · There is right ventricular hypertrophy.  · There is abnormal septal wall motion. There is systolic flattening of the interventricular septum consistent with right ventricle pressure overload.  · Mild to moderate tricuspid regurgitation.  · Normal central venous pressure (3 mmHg).  · The estimated PA systolic pressure is 35 mmHg.  · Moderate circumferential pericardial effusion. Effusion is fluid.    1. Small to moderate pericardial effusion without any evidence of  tamponade  No ventricular interdependence or right atrial right ventricle collapse  2. Left ventricle hypertrophy with posterior wall thicker than septum  Septal hypokinesis noted  3. Pulmonary hypertension mild  4. Prominent eustachian ridge in the right atrium      CURRENT/PREVIOUS VISIT EKG  Results for orders placed or performed during the hospital encounter of 05/03/23   EKG 12-lead    Collection Time: 05/03/23  8:42 AM    Narrative    Test Reason : R07.9,    Vent. Rate : 100 BPM     Atrial Rate : 100 BPM     P-R Int : 174 ms          QRS Dur : 090 ms      QT Int : 400 ms       P-R-T Axes : 061 -37 048 degrees     QTc Int : 516 ms    Normal sinus rhythm  Left axis deviation  Prolonged QT  Abnormal ECG  When compared with ECG of 23-APR-2023 02:13,  Minimal criteria for Septal infarct are no longer Present  Confirmed by Obed Smith MD ([id number]) on 5/4/2023 8:05:50 PM    Referred By: DAYAMIERR   SELF           Confirmed By:Obed Smith MD           ASSESSMENT/PLAN:     Active Hospital Problems    Diagnosis    *Lower abdominal pain    Chronic deep vein thrombosis (DVT) of left upper extremity    Anemia in ESRD (end-stage renal disease)    Chronic congestive heart failure with left ventricular diastolic dysfunction    Acute hyperglycemia    Thrombocytopenia    Uncontrolled hypertension    ESRD (end stage renal disease)    Multifocal pneumonia    Seizure disorder    Pericardial effusion       ASSESSMENT & PLAN:     Acute abdominal pain  Pericardial Effusion  Chronic Anemia  Chronic Thrombocytopenia  ESRD on HD  Hypertension  HFrEF    RECOMMENDATIONS:    Patient presented to the ED with complaints of acute abdominal pain. CT ABD/PEL  shows possible cystitis. Focal consolidation RLL.  Persistent pericardial effusion. Noted on prior CT and ECHO.  Repeat ECHO today shows EF 47%, LV dysfunction, and small pericardial effusion.    HD performed today.   Recommend further evaluation for sarcoidosis or  amyloidosis.  Thank you for the consult.  We will sign off.       Suzette Braswell NP  Department of Cardiology  Date of Service: 05/16/2023      33-year-old with history of chronic pericardial effusion end-stage kidney disease in no apparent distress at this time.  No intervention is indicated from cardiac perspective.  Recommend Hematology consultation and also evaluation for systemic amyloidosis .  I have personally interviewed and examined the patient, I have reviewed the Nurse Practitioner's history and physical, assessment, and plan. I agree with the findings and plan.      Obed Smith M.D.  Department of Cardiology  Date of Service: 05/16/2023  8:31 AM

## 2023-05-16 NOTE — ED PROVIDER NOTES
Encounter Date: 5/15/2023       History     Chief Complaint   Patient presents with    Abdominal Pain     Pt states starting this am mid to lower back pain now radiating into her lower abdomen.  Pt recently dc from hospital after blood transfusion 2 days. Dialysis pt, Tuesday/Thursday/Saturday. Last dialysis Thursday.     Back Pain     Chief complaint is severe lower back and abdominal pain.  It began at 6:00 a.m. this morning.  This patient's history dialysis which she gets  and Saturday.  She was recently admitted here discharged a few days ago after transfusion.  She has no shortness of breath.  She was treated with 10 mg of morphine at subsequent to that her pulse ox was slightly low 86% on room air.  She is better now with 3 L by nasal cannula.  I think this might be dosed by the pain medicines since she is a very petite female.  The patient has no history of kidney stones.  She does have history of gastritis.  She did vomit some today but now is feeling better after the pain medicine and nausea medicine that she got.  She also got several mg of Zofran.  She is awake alert cooperative no distress at the present time.  Vital signs stable.      Review of patient's allergies indicates:   Allergen Reactions    Penicillins Hives     Past Medical History:   Diagnosis Date    ESRD on hemodialysis 2022    Gastritis 2022    EGD was 22    Gastroparesis 2022    has not had Emptying study    Heart failure with preserved ejection fraction 2022    EF 55% on 3/22    History of supraventricular tachycardia     Hyperkalemia 2022    Hypertensive emergency 2022    Sickle cell trait 2022    Type 2 diabetes mellitus      Past Surgical History:   Procedure Laterality Date     SECTION      x 3    COLONOSCOPY      COLONOSCOPY N/A 2022    Procedure: COLONOSCOPY;  Surgeon: Jagdeep Cedeno MD;  Location: [facility];  Service: Endoscopy;  Laterality: N/A;     ESOPHAGOGASTRODUODENOSCOPY N/A 10/18/2019    Procedure: ESOPHAGOGASTRODUODENOSCOPY (EGD);  Surgeon: Gianluca Mendez MD;  Location: [facility] ENDO;  Service: Endoscopy;  Laterality: N/A;    ESOPHAGOGASTRODUODENOSCOPY N/A 08/24/2022    Procedure: EGD (ESOPHAGOGASTRODUODENOSCOPY);  Surgeon: Micky Paredes III, MD;  Location: [facility] ENDO;  Service: Endoscopy;  Laterality: N/A;    ESOPHAGOGASTRODUODENOSCOPY N/A 12/5/2022    Procedure: EGD (ESOPHAGOGASTRODUODENOSCOPY);  Surgeon: Marcelo Zhong MD;  Location: [facility] ENDO;  Service: Endoscopy;  Laterality: N/A;    LAPAROSCOPIC CHOLECYSTECTOMY N/A 07/30/2022    Procedure: CHOLECYSTECTOMY, LAPAROSCOPIC;  Surgeon: Grey Perez MD;  Location: [facility] OR;  Service: General;  Laterality: N/A;    PLACEMENT OF DUAL-LUMEN VASCULAR CATHETER Left 07/12/2022    Procedure: INSERTION-CATHETER-JOSEPH;  Surgeon: Dionte Gan MD;  Location: [facility] OR;  Service: General;  Laterality: Left;    PLACEMENT OF DUAL-LUMEN VASCULAR CATHETER Right 07/26/2022    Procedure: INSERTION-CATHETER-Hemosplit;  Surgeon: Dionte Gan MD;  Location: [facility] OR;  Service: General;  Laterality: Right;     Family History   Problem Relation Age of Onset    Diabetes Mother     Diabetes Father      Social History     Tobacco Use    Smoking status: Never    Smokeless tobacco: Never   Substance Use Topics    Alcohol use: Not Currently    Drug use: No     Review of Systems   Constitutional:  Negative for chills and fever.   HENT:  Negative for ear pain, rhinorrhea and sore throat.    Eyes:  Negative for pain and visual disturbance.   Respiratory:  Negative for cough and shortness of breath.    Cardiovascular:  Negative for chest pain and palpitations.   Gastrointestinal:  Positive for abdominal pain. Negative for constipation, diarrhea, nausea and vomiting.   Genitourinary:  Negative for dysuria, frequency, hematuria and urgency.   Musculoskeletal:  Negative for back pain, joint swelling and myalgias.   Skin:   Negative for rash.   Neurological:  Negative for dizziness, seizures, weakness and headaches.   Psychiatric/Behavioral:  Negative for dysphoric mood. The patient is not nervous/anxious.      Physical Exam     Initial Vitals [05/15/23 2040]   BP Pulse Resp Temp SpO2   (!) 163/91 91 16 98.7 °F (37.1 °C) (!) 86 %      MAP       --         Physical Exam    Nursing note and vitals reviewed.  Constitutional: She appears well-developed and well-nourished.   HENT:   Head: Normocephalic and atraumatic.   Eyes: Conjunctivae, EOM and lids are normal. Pupils are equal, round, and reactive to light.   Neck: Trachea normal. Neck supple. No thyroid mass and no thyromegaly present.   Normal range of motion.  Cardiovascular:  Normal rate, regular rhythm and normal heart sounds.           Pulmonary/Chest: Effort normal and breath sounds normal.   Abdominal: Abdomen is soft. There is no abdominal tenderness.   Musculoskeletal:         General: Normal range of motion.      Cervical back: Normal range of motion and neck supple.     Neurological: She is alert and oriented to person, place, and time. She has normal strength and normal reflexes. No cranial nerve deficit or sensory deficit.   Skin: Skin is warm and dry.   Psychiatric: She has a normal mood and affect. Her speech is normal and behavior is normal. Judgment and thought content normal.       ED Course   Procedures  Labs Reviewed   CBC W/ AUTO DIFFERENTIAL - Abnormal; Notable for the following components:       Result Value    RBC 3.23 (*)     Hemoglobin 9.7 (*)     Hematocrit 27.6 (*)     RDW 17.1 (*)     Platelets 85 (*)     Lymph # 0.7 (*)     Gran % 78.8 (*)     Lymph % 13.2 (*)     Platelet Estimate Decreased (*)     All other components within normal limits   COMPREHENSIVE METABOLIC PANEL - Abnormal; Notable for the following components:    Sodium 133 (*)     Chloride 93 (*)     Glucose 399 (*)     BUN 30 (*)     Creatinine 5.6 (*)     Albumin 3.4 (*)     Total Bilirubin  1.7 (*)     Alkaline Phosphatase 178 (*)     eGFR 9.6 (*)     All other components within normal limits   SEDIMENTATION RATE - Abnormal; Notable for the following components:    Sed Rate 48 (*)     All other components within normal limits   POCT GLUCOSE - Abnormal; Notable for the following components:    POC Glucose 327 (*)     All other components within normal limits   POCT GLUCOSE - Abnormal; Notable for the following components:    POC Glucose 296 (*)     All other components within normal limits   PROCALCITONIN   C-REACTIVE PROTEIN   SEDIMENTATION RATE   GLUCOSE, RANDOM   C-REACTIVE PROTEIN   PROCALCITONIN          Imaging Results              X-Ray Chest AP Portable (In process)                      CT Abdomen Pelvis  Without Contrast (Final result)  Result time 05/15/23 23:41:06      Final result by Sadie Mares MD (05/15/23 23:41:06)                   Narrative:    EXAM DESCRIPTION:  CT ABDOMEN PELVIS WITHOUT IV CONTRAST    CLINICAL HISTORY:  abdominal pain. Mid to lower back pain starting this a.m., now radiating into lower abdomen. Recently EDC from hospital after blood transfusion. Dialysis patient.    TECHNIQUE:  Multiple axial images were obtained through the abdomen and pelvis without intravenous contrast. Coronal and sagittal images were reconstructed.  All CT scans at this facility use dose modulation, iterative reconstruction, and/or weight based dosing when appropriate to reduce radiation dose to as low as reasonably achievable.    COMPARISON: Previous abdomen and pelvis CT dated 5/3/2023.    FINDINGS:  Slight focal consolidation within the right lung base, and pneumonia is not excluded. Minimal hypoventilatory change within the lung bases.  Persistent moderate pericardial effusion, as on prior.    ABDOMEN:    Stomach: Within normal limits.    Liver: No definable focal lesions. Mild hepatomegaly, with a craniocaudal liver length of approximately 18.1 cm.    Gallbladder: Surgically  absent.    Pancreas: Grossly unremarkable.    Spleen: Within normal limits.    Adrenal glands: Within normal limits.    Right kidney: No hydronephrosis. No renal or ureteral calculi.  Left kidney: No hydronephrosis. No renal or ureteral calculi.    Vascular structures: Vascular calcifications.    Lymph nodes: Limited evaluation due to the lack of IV contrast.    PELVIS:    Small bowel: No significant distention.    Appendix: Partially visualized, and appears unremarkable.    Colon: No distention or acute pericolonic edema.    Bladder: Circumferential bladder wall thickening. Correlation for possible cystitis could be done.    Slight diffuse edema within the omentum, nonspecific.  Minimal free fluid within the cul-de-sac, possibly physiologic fluid.  No free intraperitoneal air.  Mild generalized anasarca.    Bones: No acute bone findings.    Note that evaluation of the bowel and solid organs is somewhat limited due to lack of intravenous contrast.    IMPRESSION:  1.  Circumferential bladder wall thickening. Correlation for possible cystitis could be done.  2.  Slight focal consolidation within the right lung base, and pneumonia is not excluded.  3.  Persistent moderate pericardial effusion, as on prior.  4.  Mild hepatomegaly.  5.  Slight diffuse edema within the omentum, nonspecific.  6.  Mild generalized anasarca.    Electronically signed by:  Sadie Mares MD  5/15/2023 11:41 PM CDT Workstation: [id number]                                     Medications   hydrALAZINE injection 10 mg (10 mg Intravenous Given 5/15/23 7984)   insulin regular injection 7 Units 0.07 mL (7 Units Intravenous Given 5/16/23 0207)     Medical Decision Making:   ED Management:  The patient is here for abdominal pain differential diagnosis includes GI possibilities including appendicitis cholecystitis hepatitis Crohn's disease diverticulitis appendicitis bowel obstruction among others.  The patient here had a CT scan of the abdomen shows  a possible pneumonia and she is slightly hypoxic and she does have an anion gap and has diabetes.  She also is a dialysis patient.  Because of the reason hospitalist consult that the patient will be admitted.                        Clinical Impression:   Final diagnoses:  [R73.9] Hyperglycemia (Primary)        ED Disposition Condition    Observation Stable                Ruiz James MD  05/16/23 6031

## 2023-05-16 NOTE — CONSULTS
Critical access hospital  Wound Care    Patient Name:  Tabby Howard   MRN:  [id number]  Date: 5/16/2023  Diagnosis: Lower abdominal pain    History:     Past Medical History:   Diagnosis Date    ESRD on hemodialysis 04/12/2022    Gastritis 12/2022    EGD was 12/5/22    Gastroparesis 04/12/2022    has not had Emptying study    Heart failure with preserved ejection fraction 04/12/2022    EF 55% on 3/22    History of supraventricular tachycardia     Hyperkalemia 07/07/2022    Hypertensive emergency 07/08/2022    Sickle cell trait 04/12/2022    Type 2 diabetes mellitus        Social History     Socioeconomic History    Marital status:    Tobacco Use    Smoking status: Never    Smokeless tobacco: Never   Substance and Sexual Activity    Alcohol use: Not Currently    Drug use: No    Sexual activity: Not Currently     Partners: Male     Birth control/protection: I.U.D.     Social Determinants of Health     Financial Resource Strain: Low Risk     Difficulty of Paying Living Expenses: Not very hard   Food Insecurity: No Food Insecurity    Worried About Running Out of Food in the Last Year: Never true    Ran Out of Food in the Last Year: Never true   Transportation Needs: No Transportation Needs    Lack of Transportation (Medical): No    Lack of Transportation (Non-Medical): No   Physical Activity: Inactive    Days of Exercise per Week: 0 days    Minutes of Exercise per Session: 0 min   Stress: No Stress Concern Present    Feeling of Stress : Not at all   Social Connections: Unknown    Frequency of Communication with Friends and Family: More than three times a week    Frequency of Social Gatherings with Friends and Family: More than three times a week    Attends Sabianism Services: Never    Active Member of Clubs or Organizations: No    Attends Club or Organization Meetings: Never    Marital Status: Patient refused   Housing Stability: Low Risk     Unable to Pay for Housing in the Last Year: No    Number of Places  Lived in the Last Year: 1    Unstable Housing in the Last Year: No       Precautions:     Allergies as of 05/15/2023 - Reviewed 05/15/2023   Allergen Reaction Noted    Penicillins Hives 02/27/2020       WOC Assessment Details/Treatment       Abrasion noted to L knee    Stage 1 PI noted to sacrum     Recommendations made to primary team for  Clean sacrum and L knee  with chlorhexidine/ns. Pat dry. Apply thin layer of Triad and place on an air flow pad daily. Orders placed.     Pressure prevention protocol. Turn q 2 hr. Float and elevate heels off bed with Pillows. Waffle air mattress as needed.      05/16/2023

## 2023-05-16 NOTE — CARE UPDATE
05/16/23 0751   Patient Assessment/Suction   Level of Consciousness (AVPU) alert   Respiratory Effort Normal;Unlabored   Expansion/Accessory Muscles/Retractions no use of accessory muscles;no retractions;expansion symmetric   All Lung Fields Breath Sounds clear   Rhythm/Pattern, Respiratory unlabored;pattern regular;depth regular   PRE-TX-O2   Device (Oxygen Therapy) nasal cannula   Flow (L/min) 4   SpO2 (!) 79 %  (found pt with sats of 79% on RA. placed pt on 4L and sats increased to 94%)   Pulse Oximetry Type Intermittent   $ Pulse Oximetry - Single Charge Pulse Oximetry - Single   Aerosol Therapy   $ Aerosol Therapy Charges PRN treatment not required

## 2023-05-16 NOTE — NURSING
Nurses Note -- 4 Eyes      5/16/2023   5:45 AM      Skin assessed during: Admit      [] No Altered Skin Integrity Present    []Prevention Measures Documented      [x] Yes- Altered Skin Integrity Present or Discovered   [] LDA Added if Not in Epic (Describe Wound)   [x] New Altered Skin Integrity was Present on Admit and Documented in LDA   [x] Wound Image Taken    Wound Care Consulted? No    Attending Nurse:  Patti Smith LPN     Second RN/Staff Member:  [id number]

## 2023-05-16 NOTE — PLAN OF CARE
Critical access hospital  Initial Discharge Assessment       Primary Care Provider: AIDEN CONNER NP    Admission Diagnosis: Hyperglycemia [R73.9]    Admission Date: 5/15/2023  Expected Discharge Date: 5/17/2023    Transition of Care Barriers: None    Initial assessment completed at bedside with pt. Pt is established at  [facility] location T,Th and Sat at 11 am. Patients family provides transportation. Patient anticipates discharge home when medically clear.    Payor: MEDICAID / Plan: [place]UE [place] / Product Type: Managed Medicaid /     Extended Emergency Contact Information  Primary Emergency Contact: Tanya Howard  Address: [address] [place] [address], [place] [address] [facility]  Home Phone: [phone]  Mobile Phone: [phone]  Relation: Step parent  Preferred language: English   needed? No  Secondary Emergency Contact: Garcia Howard  Address: [address] [place] [address], [place] [address]  Mobile Phone: [phone]  Relation: Father  Preferred language: English   needed? No    Discharge Plan A: Home with family  Discharge Plan B: Home with family      [facility]  [address] [place] [address] [address]  [facility] [address]  Phone: [phone] Fax: [phone]      Initial Assessment (most recent)       Adult Discharge Assessment - 05/16/23 1440          Discharge Assessment    Assessment Type Discharge Planning Assessment     Confirmed/corrected address, phone number and insurance Yes     Confirmed Demographics Correct on Facesheet     Source of Information patient     When was your last doctors appointment? 05/02/23     Does patient/caregiver understand observation status Yes     Reason For Admission Hyperglycemia     People in Home parent(s)     Facility Arrived From: home     Do you expect to return to your current living situation? Yes     Do you have help at home or someone to help you manage your care at home? Yes     Who are your  caregiver(s) and their phone number(s)? Tanya (step parent) [phone]     Prior to hospitilization cognitive status: Alert/Oriented     Current cognitive status: Alert/Oriented     Walking or Climbing Stairs ambulation difficulty, requires equipment     Mobility Management walker     Do you have any problems with: Needs other help     Home Accessibility wheelchair accessible     Home Layout Able to live on 1st floor     Equipment Currently Used at Home walker, rolling;glucometer     Readmission within 30 days? Yes     Patient currently being followed by outpatient case management? Yes     If yes, name of outpatient case management following: insurance company assigned oupatient case management     Do you currently have service(s) that help you manage your care at home? No     Do you take prescription medications? Yes     Do you have prescription coverage? Yes     Coverage Healthy Blue     Do you have any problems affording any of your prescribed medications? No     Is the patient taking medications as prescribed? yes     Who is going to help you get home at discharge? Tanya (step parent) [phone]     How do you get to doctors appointments? family or friend will provide     Are you on dialysis? Yes     Dialysis Name and Scheduled days [facility] T,Th,Sat 11 am     Do you take coumadin? No     Discharge Plan A Home with family     Discharge Plan B Home with family     DME Needed Upon Discharge  none     Discharge Plan discussed with: Patient     Transition of Care Barriers None        Physical Activity    On average, how many days per week do you engage in moderate to strenuous exercise (like a brisk walk)? 0 days     On average, how many minutes do you engage in exercise at this level? 0 min        Financial Resource Strain    How hard is it for you to pay for the very basics like food, housing, medical care, and heating? Not very hard        Housing Stability    In the last 12 months, was there a time  when you were not able to pay the mortgage or rent on time? No     In the last 12 months, how many places have you lived? 1     In the last 12 months, was there a time when you did not have a steady place to sleep or slept in a shelter (including now)? No        Transportation Needs    In the past 12 months, has lack of transportation kept you from medical appointments or from getting medications? No     In the past 12 months, has lack of transportation kept you from meetings, work, or from getting things needed for daily living? No        Food Insecurity    Within the past 12 months, you worried that your food would run out before you got the money to buy more. Never true     Within the past 12 months, the food you bought just didn't last and you didn't have money to get more. Never true        Stress    Do you feel stress - tense, restless, nervous, or anxious, or unable to sleep at night because your mind is troubled all the time - these days? Not at all        Social Connections    In a typical week, how many times do you talk on the phone with family, friends, or neighbors? More than three times a week     How often do you get together with friends or relatives? More than three times a week     How often do you attend Voodoo or Adventist services? Never     Do you belong to any clubs or organizations such as Voodoo groups, unions, fraternal or athletic groups, or school groups? No     How often do you attend meetings of the clubs or organizations you belong to? Never     Are you , , , , never , or living with a partner? Patient refused        Alcohol Use    Q1: How often do you have a drink containing alcohol? Never     Q2: How many drinks containing alcohol do you have on a typical day when you are drinking? Patient does not drink     Q3: How often do you have six or more drinks on one occasion? Never        OTHER    Name(s) of People in Home Tanya (step parent)  [phone]

## 2023-05-16 NOTE — ASSESSMENT & PLAN NOTE
Echo    Interpretation Summary  · The left ventricle is normal in size with moderate concentric hypertrophy and low normal systolic function.  · The estimated ejection fraction is 50%.  · Normal right ventricular size with normal right ventricular systolic function.  · Intermediate central venous pressure (8 mmHg).  · Small circumferential pericardial effusion. Effusion is fluid.  · Patient has small-to-moderate pericardial effusion anteriorly subcostally it is approximally 1.7 cm  · There is no evidence of constriction or tamponade  · No ventricular interdependence  · Posterior fusion smaller than the anterior    Recent Labs   Lab 05/11/23  1232   BNP 1,935*   .

## 2023-05-16 NOTE — HPI
Tabby Howard is a 33 y.o. female with a history as  has a past medical history of ESRD on hemodialysis (04/12/2022), Gastritis (12/2022), Gastroparesis (04/12/2022), Heart failure with preserved ejection fraction (04/12/2022), History of supraventricular tachycardia, Hyperkalemia (07/07/2022), Hypertensive emergency (07/08/2022), Sickle cell trait (04/12/2022), and Type 2 diabetes mellitus. who presented to the ED with a Abdominal Pain (Pt states starting this am mid to lower back pain now radiating into her lower abdomen.  Pt recently dc from hospital after blood transfusion 2 days. Dialysis pt, Tuesday/Thursday/Saturday. Last dialysis Thursday. ) and Back Pain    Patient presents to the emergency room, via EMS, for evaluation of severe lower abdominal pain that started today with associated nausea and NBNB emesis.  On assessment patient very sleepy however arousable and responds appropriately to questions.  Per ED provider patient was given 10 mg of morphine per EMS EN route, which is likely contributing to her drowsiness.  Patient end-stage renal disease with HD Tuesday Thursday and Saturdays.      Patient recent hospital admission to [facility] (05/11/2023 through 05/13/2023) treated for symptomatic anemia and was dialyzed during that time on 05/11/2023.  Patient was discharged 05/13/2023 which is a dialysis day and missed dialysis.     Lab and imaging obtained and reviewed.  CBC completed and shows RBCs 3.23 H/H 9.7/27.6 RDW 17.1 platelets 85 g % 78.8 lymph% 13.2.  Chemistry profile shows sodium 133 chloride 93 BUN 30 creatinine 5.6 GFR 9.6 glucose 399 alk phos 178 albumin 3.4 total albumin 1.7.  On admit, afebrile HR 91 R 16 /91 with initial sats 86% on room air in improving to 94-97% on 3 L O2 per nasal cannula.       CT abdomen pelvis  IMPRESSION:  1.  Circumferential bladder wall thickening. Correlation for possible cystitis could be done.  2.  Slight focal consolidation within the  right lung base, and pneumonia is not excluded.  3.  Persistent moderate pericardial effusion, as on prior.  4.  Mild hepatomegaly.  5.  Slight diffuse edema within the omentum, nonspecific.  6.  Mild generalized anasarca.    Recent Echo  Summary  The left ventricle is normal in size with moderate concentric hypertrophy and low normal systolic function.  The estimated ejection fraction is 50%.  Normal right ventricular size with normal right ventricular systolic function.  Intermediate central venous pressure (8 mmHg).  Small circumferential pericardial effusion. Effusion is fluid.  Patient has small-to-moderate pericardial effusion anteriorly subcostally it is approximally 1.7 cm  There is no evidence of constriction or tamponade  No ventricular interdependence  Posterior fusion smaller than the anterior       Per ED provider patient presented to the emergency room via EMS for evaluation of abdominal pain.  EN route patient was given 2 mg of morphine and noted to have O2 sats in 80s.  She was placed on 2-3 L of oxygen per nasal cannula with improvement of oxygen saturation.  Labs reviewed in noted to be hyperglycemic treated with 7 units of regular insulin also noted to be hypertensive and given IV hydralazine 10 mg x 1.

## 2023-05-16 NOTE — H&P
[facility] - Emergency Dept  Hospital Medicine  History & Physical    Patient Name: Tabby Howard  MRN: [id number]  Patient Class: OP- Observation  Admission Date: 5/15/2023  Attending Physician: Shakeel Humphries MD   Primary Care Provider: AIDEN CONNER NP         Patient information was obtained from patient and ER records.     Subjective:     Principal Problem:Lower abdominal pain    Chief Complaint:   Chief Complaint   Patient presents with    Abdominal Pain     Pt states starting this am mid to lower back pain now radiating into her lower abdomen.  Pt recently dc from hospital after blood transfusion 2 days. Dialysis pt, Tuesday/Thursday/Saturday. Last dialysis Thursday.     Back Pain        HPI: Tabby Howard is a 33 y.o. female with a history as  has a past medical history of ESRD on hemodialysis (04/12/2022), Gastritis (12/2022), Gastroparesis (04/12/2022), Heart failure with preserved ejection fraction (04/12/2022), History of supraventricular tachycardia, Hyperkalemia (07/07/2022), Hypertensive emergency (07/08/2022), Sickle cell trait (04/12/2022), and Type 2 diabetes mellitus. who presented to the ED with a Abdominal Pain (Pt states starting this am mid to lower back pain now radiating into her lower abdomen.  Pt recently dc from hospital after blood transfusion 2 days. Dialysis pt, Tuesday/Thursday/Saturday. Last dialysis Thursday. ) and Back Pain    Patient presents to the emergency room, via EMS, for evaluation of severe lower abdominal pain that started today with associated nausea and NBNB emesis.  On assessment patient very sleepy however arousable and responds appropriately to questions.  Per ED provider patient was given 10 mg of morphine per EMS EN route, which is likely contributing to her drowsiness.  Patient end-stage renal disease with HD Tuesday Thursday and Saturdays.      Patient recent hospital admission to [facility] (05/11/2023 through 05/13/2023)  treated for symptomatic anemia and was dialyzed during that time on 05/11/2023.  Patient was discharged 05/13/2023 which is a dialysis day and missed dialysis.     Lab and imaging obtained and reviewed.  CBC completed and shows RBCs 3.23 H/H 9.7/27.6 RDW 17.1 platelets 85 g % 78.8 lymph% 13.2.  Chemistry profile shows sodium 133 chloride 93 BUN 30 creatinine 5.6 GFR 9.6 glucose 399 alk phos 178 albumin 3.4 total albumin 1.7.  On admit, afebrile HR 91 R 16 /91 with initial sats 86% on room air in improving to 94-97% on 3 L O2 per nasal cannula.       CT abdomen pelvis  IMPRESSION:  1.  Circumferential bladder wall thickening. Correlation for possible cystitis could be done.  2.  Slight focal consolidation within the right lung base, and pneumonia is not excluded.  3.  Persistent moderate pericardial effusion, as on prior.  4.  Mild hepatomegaly.  5.  Slight diffuse edema within the omentum, nonspecific.  6.  Mild generalized anasarca.    Recent Echo  Summary   The left ventricle is normal in size with moderate concentric hypertrophy and low normal systolic function.   The estimated ejection fraction is 50%.   Normal right ventricular size with normal right ventricular systolic function.   Intermediate central venous pressure (8 mmHg).   Small circumferential pericardial effusion. Effusion is fluid.   Patient has small-to-moderate pericardial effusion anteriorly subcostally it is approximally 1.7 cm   There is no evidence of constriction or tamponade   No ventricular interdependence   Posterior fusion smaller than the anterior       Per ED provider patient presented to the emergency room via EMS for evaluation of abdominal pain.  EN route patient was given 2 mg of morphine and noted to have O2 sats in 80s.  She was placed on 2-3 L of oxygen per nasal cannula with improvement of oxygen saturation.  Labs reviewed in noted to be hyperglycemic treated with 7 units of regular insulin also noted to be  hypertensive and given IV hydralazine 10 mg x 1.                 Past Medical History:   Diagnosis Date    ESRD on hemodialysis 2022    Gastritis 2022    EGD was 21    Gastroparesis 2022    has not had Emptying study    Heart failure with preserved ejection fraction 2022    EF 55% on 3/22    History of supraventricular tachycardia     Hyperkalemia 2022    Hypertensive emergency 2022    Sickle cell trait 2022    Type 2 diabetes mellitus        Past Surgical History:   Procedure Laterality Date     SECTION      x 3    COLONOSCOPY      COLONOSCOPY N/A 2022    Procedure: COLONOSCOPY;  Surgeon: Jagdeep Cedeno MD;  Location: [facility];  Service: Endoscopy;  Laterality: N/A;    ESOPHAGOGASTRODUODENOSCOPY N/A 10/18/2019    Procedure: ESOPHAGOGASTRODUODENOSCOPY (EGD);  Surgeon: Gianluca Mendez MD;  Location: [facility];  Service: Endoscopy;  Laterality: N/A;    ESOPHAGOGASTRODUODENOSCOPY N/A 2022    Procedure: EGD (ESOPHAGOGASTRODUODENOSCOPY);  Surgeon: Micky Paredes III, MD;  Location: [facility];  Service: Endoscopy;  Laterality: N/A;    ESOPHAGOGASTRODUODENOSCOPY N/A 2022    Procedure: EGD (ESOPHAGOGASTRODUODENOSCOPY);  Surgeon: Marcelo Zhong MD;  Location: [facility];  Service: Endoscopy;  Laterality: N/A;    LAPAROSCOPIC CHOLECYSTECTOMY N/A 2022    Procedure: CHOLECYSTECTOMY, LAPAROSCOPIC;  Surgeon: Grey Perez MD;  Location: [facility];  Service: General;  Laterality: N/A;    PLACEMENT OF DUAL-LUMEN VASCULAR CATHETER Left 2022    Procedure: INSERTION-CATHETER-JOSEPH;  Surgeon: Dionte Gan MD;  Location: [facility] OR;  Service: General;  Laterality: Left;    PLACEMENT OF DUAL-LUMEN VASCULAR CATHETER Right 2022    Procedure: INSERTION-CATHETER-Hemosplit;  Surgeon: Dionte Gan MD;  Location: [facility] OR;  Service: General;  Laterality: Right;       Review of patient's allergies indicates:   Allergen Reactions     Penicillins Hives       No current facility-administered medications on file prior to encounter.     Current Outpatient Medications on File Prior to Encounter   Medication Sig    amLODIPine (NORVASC) 5 MG tablet Take 2 tablets (10 mg total) by mouth once daily.    apixaban (ELIQUIS) 5 mg Tab Take 1 tablet (5 mg total) by mouth 2 (two) times daily.    carvediloL (COREG) 25 MG tablet Take 1 tablet twice a day by oral route for 30 days. (Patient taking differently: Take 25 mg by mouth 2 (two) times daily.)    dicyclomine (BENTYL) 10 MG capsule Take 1 capsule (10 mg total) by mouth 4 (four) times daily as needed (abdominal pain).    FLUoxetine 20 MG capsule Take 1 capsule every day by oral route for 30 days. (Patient taking differently: Take 20 mg by mouth once daily.)    gabapentin (NEURONTIN) 100 MG capsule Take 1 capsule by mouth 3 times daily (Patient taking differently: Take 100 mg by mouth 3 (three) times daily.)    hydrALAZINE (APRESOLINE) 100 MG tablet Take 1 tablet (100 mg total) by mouth every 8 (eight) hours.    insulin aspart U-100 (NOVOLOG) 100 unit/mL (3 mL) InPn pen Inject 4 Units into the skin 3 (three) times daily with meals.    insulin detemir U-100, Levemir, 100 unit/mL (3 mL) SubQ InPn pen Inject 6 Units into the skin 2 (two) times daily.    isosorbide mononitrate (IMDUR) 30 MG 24 hr tablet Take 3 tablets (90 mg total) by mouth once daily.    levETIRAcetam (KEPPRA) 500 MG Tab Take 1 tablet twice a day by oral route for 30 days. (Patient taking differently: Take 500 mg by mouth 2 (two) times daily.)    ondansetron (ZOFRAN-ODT) 4 MG TbDL Take 1 tablet (4 mg total) by mouth every 6 (six) hours as needed.    pantoprazole (PROTONIX) 40 MG tablet Take 1 tablet every day by oral route for 30 days. (Patient taking differently: Take 40 mg by mouth once daily.)    sucralfate (CARAFATE) 100 mg/mL suspension Take 10 mLs (1 g total) by mouth 4 (four) times daily before meals and nightly.     "amitriptyline (ELAVIL) 50 MG tablet Take 50 mg by mouth every evening.    pen needle, diabetic 31 gauge x 3/16" Ndle Use as directed to inject insulin 5 times daily    [DISCONTINUED] atenoloL (TENORMIN) 50 MG tablet Take 1 tablet (50 mg total) by mouth every other day.    [DISCONTINUED] omeprazole (PRILOSEC) 20 MG capsule Take 2 capsules (40 mg total) by mouth once daily. for 10 days     Family History       Problem Relation (Age of Onset)    Diabetes Mother, Father          Tobacco Use    Smoking status: Never    Smokeless tobacco: Never   Substance and Sexual Activity    Alcohol use: Not Currently    Drug use: No    Sexual activity: Not Currently     Partners: Male     Birth control/protection: I.U.D.     Review of Systems   Constitutional:  Negative for chills, diaphoresis and fever.   HENT:  Negative for congestion, postnasal drip, sinus pressure and sore throat.    Eyes:  Negative for visual disturbance.   Respiratory:  Negative for cough, chest tightness, shortness of breath and wheezing.    Cardiovascular:  Negative for chest pain, palpitations and leg swelling.   Gastrointestinal:  Positive for abdominal pain, nausea and vomiting. Negative for abdominal distention, blood in stool, constipation and diarrhea.   Endocrine: Negative.    Genitourinary:  Negative for dysuria.   Musculoskeletal: Negative.    Skin: Negative.    Allergic/Immunologic: Negative.    Neurological:  Negative for dizziness, weakness, numbness and headaches.   Hematological: Negative.    Psychiatric/Behavioral: Negative.     Objective:     Vital Signs (Most Recent):  Temp: 98.7 °F (37.1 °C) (05/15/23 2040)  Pulse: 82 (05/16/23 0230)  Resp: 16 (05/15/23 2040)  BP: 134/75 (05/16/23 0230)  SpO2: 98 % (05/16/23 0230) Vital Signs (24h Range):  Temp:  [98.7 °F (37.1 °C)] 98.7 °F (37.1 °C)  Pulse:  [82-91] 82  Resp:  [16] 16  SpO2:  [86 %-100 %] 98 %  BP: (134-190)/() 134/75     Weight: 54 kg (119 lb)  Body mass index is 21.77 " kg/m².     Physical Exam  Constitutional:       General: She is not in acute distress.     Appearance: Normal appearance. She is well-developed. She is not toxic-appearing or diaphoretic.   HENT:      Head: Normocephalic and atraumatic.   Eyes:      General: Lids are normal.      Conjunctiva/sclera: Conjunctivae normal.      Pupils: Pupils are equal, round, and reactive to light.   Neck:      Thyroid: No thyroid mass or thyromegaly.      Vascular: Normal carotid pulses. No JVD.      Trachea: Trachea normal. No tracheal deviation.   Cardiovascular:      Rate and Rhythm: Normal rate and regular rhythm.      Pulses: Normal pulses.      Heart sounds: Normal heart sounds, S1 normal and S2 normal.   Pulmonary:      Effort: Pulmonary effort is normal.      Breath sounds: Normal breath sounds. No stridor.   Abdominal:      General: Bowel sounds are normal. There is no distension.      Palpations: Abdomen is soft.      Tenderness: There is abdominal tenderness in the right lower quadrant and left lower quadrant. There is no guarding or rebound.   Musculoskeletal:         General: Normal range of motion.      Cervical back: Full passive range of motion without pain, normal range of motion and neck supple.   Skin:     General: Skin is warm and dry.      Nails: There is no clubbing.   Neurological:      Mental Status: She is alert and oriented to person, place, and time.      Cranial Nerves: No cranial nerve deficit.      Sensory: No sensory deficit.   Psychiatric:         Speech: Speech normal.         Behavior: Behavior normal. Behavior is cooperative.         Thought Content: Thought content normal.         Judgment: Judgment normal.            CRANIAL NERVES     CN III, IV, VI   Pupils are equal, round, and reactive to light.     Significant Labs: All pertinent labs within the past 24 hours have been reviewed.  A1C:   Recent Labs   Lab 12/23/22  1413 03/03/23  0547   HGBA1C 7.5* 5.8     Bilirubin:   Recent Labs   Lab  04/24/23  0915 05/03/23  0930 05/11/23  1232 05/12/23  0419 05/15/23  2104   BILITOT 2.2* 1.4* 2.1* 1.7* 1.7*     BMP:   Recent Labs   Lab 05/15/23  2104 05/16/23  0214   * 340*   *  --    K 4.3  --    CL 93*  --    CO2 24  --    BUN 30*  --    CREATININE 5.6*  --    CALCIUM 9.1  --      CBC:   Recent Labs   Lab 05/15/23  2104   WBC 5.47   HGB 9.7*   HCT 27.6*   PLT 85*     CMP:   Recent Labs   Lab 05/15/23  2104 05/16/23  0214   *  --    K 4.3  --    CL 93*  --    CO2 24  --    * 340*   BUN 30*  --    CREATININE 5.6*  --    CALCIUM 9.1  --    PROT 6.9  --    ALBUMIN 3.4*  --    BILITOT 1.7*  --    ALKPHOS 178*  --    AST 23  --    ALT 22  --    ANIONGAP 16  --      TSH:   Recent Labs   Lab 02/08/23  0505   TSH 1.141       Significant Imaging: I have reviewed all pertinent imaging results/findings within the past 24 hours.  X-Ray Abdomen AP 1 View (KUB)    Result Date: 4/22/2023  EXAMINATION: XR ABDOMEN AP 1 VIEW CLINICAL HISTORY: Generalized abdominal pain TECHNIQUE: AP View(s) of the abdomen was performed. COMPARISON: Abdomen x-ray of March 13, 2023 FINDINGS: The bowel gas pattern is within normal limits.  Surgical clips in the right upper quadrant are consistent with prior cholecystectomy.  No free air is seen.  No masses or calcifications are identified.     Prior cholecystectomy.  Otherwise negative abdomen x-ray. Electronically signed by: Gianluca Arriaga MD Date:    04/22/2023 Time:    09:51    CTA Chest Non-Coronary (PE Studies)    Result Date: 5/3/2023  CMS MANDATED QUALITY DATA-CT RADIATION-436 HISTORY: Vomiting and chest pain. TECHNICAL FACTORS: Thin axial imaging through the chest was performed with 100 mL of Omnipaque 350 IV contrast, with sagittal and coronal images, MIPS, and or 3D reconstructions performed. All CT exams at this facility use dose modulation, iterative reconstruction, and or weight based dosing when appropriate to reduce radiation dose to as low as reasonably  achievable. COMPARISON: 5/2/2023 FINDINGS: Examination is of diagnostic quality as there is normal opacification of pulmonary arterial tree out to the tertiary order branch vessels without evidence of pruning, truncation, webbing the pulmonary arterial system. No evidence of acute pulmonary thromboembolism. Normal appearance the main pulmonary trunk without evidence of saddle embolus at the branch of the main pulmonary trunk. The pulmonary arteries and respective divisions are normal in caliber and distribution out to the tertiary branch vessels, however segment arteries at the level of the second and third order branches are somewhat limited by motion artifact and quantum adenoids. There is a small to moderate amount of pericardial fluid encircling the cardiac chamber is evidence of fairly moderate concentric thickening of the left ventricular wall secondary to systemic hypertension on the long-standing basis. No significant backflow contrast into the suprahepatic inferior vena cava suggestive of underlying cardiac decompensation. Small to moderate-sized sliding-type hiatal hernia is noted. Parenchymal lung windows settings demonstrate no evidence of mass or abnormal there are geographic areas of ground attenuation within the apical segments attributed towards atelectasis, pulmonary hemorrhage, and/or edema. Tracheal column appears midline without evidence of intraluminal occlusion. The upper abdominal cavity is unremarkable in CT appearance. No evidence of adrenal enlargement. IMPRESSION: 1. No CT evidence of acute pulmonary thromboembolism. 2. Moderate-sized pericardial effusion encircles the cardiac chambers similar in volume as compared to previous. 3. Geographic areas of prominence attenuation throughout the bilateral lung fields attributed towards the atelectasis, pneumonia, hemorrhage, or edema. The overall distribution appears unchanged upon comparison. Electronically signed by:  Deandre Vitale MD  5/3/2023  4:16 PM CDT Workstation: [id number]    CT Abdomen Pelvis With Contrast    Result Date: 5/3/2023  EXAM: CT ABDOMEN PELVIS WITH CONTRAST HISTORY: Abdominal pain, acute, nonlocalized. Vomiting. COMPARISON: CT scan of the abdomen and pelvis dated 3/10/2023. TECHNIQUE: Multiple axial 2 mm thick images were acquired through the abdomen and pelvis with IV contrast only. This exam was performed according to our departmental dose-optimization program, which includes automated exposure control, adjustment of the mA and/or kV according to patient size and/or use of iterative reconstruction technique. Unless otherwise stated, incidental findings do not require dedicated follow-up imaging. FINDINGS: Images through the lung bases show marked cardiomegaly and a moderate-sized pericardial effusion. The liver, spleen, pancreas, kidneys, and adrenal glands appear within normal limits. The gallbladder is absent. There is no bile duct dilatation. The walls of the stomach appears somewhat edematous and there is mild mucosal hyperenhancement and the stomach suspicious for gastritis. The small bowel is normal in caliber. There is no evidence of small bowel obstruction..  There is a moderately large amount of formed stool throughout the colon including  hyperdense, likely desiccated formed stool moderately distending the rectum. The stool burden has increased since the preceding CT scan. The findings are compatible with constipation and probable developing fecal impaction. The urinary bladder is well distended and appears grossly normal. The uterus is grossly normal. No abnormal cystic or solid masses are identified in the pelvis. IMPRESSION:   Probable mild gastritis. Large amount of formed stool throughout the colon including hyperdense stool distending the rectum suspicious for developing fecal impaction. Cardiomegaly. Moderate-sized pericardial effusion. Electronically signed by:  Gianluca Hudson MD  5/3/2023 4:25 PM CDT Workstation:  [id number]    X-Ray Chest AP Portable    Result Date: 5/11/2023  Chest single view CLINICAL DATA: Hypoxia, abdominal pain FINDINGS: Comparison to May 3. Cardiomegaly is stable. The mediastinum is unremarkable. Dual lead right IJ central catheter is unchanged in position. The lungs are clear. No osseous abnormalities are identified. IMPRESSION: 1. No acute process. 2. Stable cardiomegaly. Electronically signed by:  Zaid Wilson MD  5/11/2023 2:48 PM CDT Workstation: [id number]    X-Ray Chest AP Portable    Result Date: 5/3/2023  CLINICAL HISTORY: 33 years (1990) Female Chest Pain Vomiting; Chest Pain TECHNIQUE: Portable AP radiograph the chest. COMPARISON: None available. FINDINGS: The lungs are clear. No pneumothorax is identified. The cardiac silhouette is moderately enlarged. There is a right-sided IJ dual-lumen central venous catheter with distal tip at the level of the SVC. Osseous structures appear within normal limits. The visualized upper abdomen is unremarkable. IMPRESSION: No acute cardiac or pulmonary process. . Electronically signed by:  Deandre Shirley MD  5/3/2023 10:05 AM CDT Workstation: [id number]    X-Ray Chest AP Portable    Result Date: 4/22/2023  EXAMINATION: XR CHEST AP PORTABLE CLINICAL HISTORY: Shortness of breath TECHNIQUE: Single frontal view of the chest was performed. COMPARISON: Chest x-ray of April 17, 2022 FINDINGS: A double lumen central line enters on the right and ends at the level of the right atrium.  There is cardiomegaly in a biventricular pattern.  Intrapulmonary mass or infiltrate is not seen.  There is no pneumothorax or pleural effusion.     Moderate cardiomegaly.  Double-lumen central line ends at the level of the right atrium. Electronically signed by: Gianluca Arriaga MD Date:    04/22/2023 Time:    09:52    X-Ray Chest AP Portable    Result Date: 4/17/2023  EXAMINATION: XR CHEST AP PORTABLE CLINICAL HISTORY: Chest Pain; TECHNIQUE: Single frontal view of the  chest was performed. COMPARISON: April 10, 2023 FINDINGS: There is moderate enlargement of the cardiac silhouette.  A right internal jugular dual lumen central venous dialysis catheter is in place.  No confluent infiltrates are seen.  The bony structures are unremarkable.     No definite acute cardiopulmonary disease in this patient with chronic cardiomegaly and a dual lumen central venous dialysis catheter in place. Electronically signed by: Thanh Davila MD Date:    04/17/2023 Time:    11:11    Echo    Result Date: 4/23/2023  · The left ventricle is normal in size with moderate concentric hypertrophy and low normal systolic function. · The estimated ejection fraction is 50%. · Normal right ventricular size with normal right ventricular systolic function. · Intermediate central venous pressure (8 mmHg). · Small circumferential pericardial effusion. Effusion is fluid. · Patient has small-to-moderate pericardial effusion anteriorly subcostally it is approximally 1.7 cm · There is no evidence of constriction or tamponade · No ventricular interdependence · Posterior fusion smaller than the anterior      CT Abdomen Pelvis  Without Contrast    Result Date: 5/15/2023  EXAM DESCRIPTION: CT ABDOMEN PELVIS WITHOUT IV CONTRAST CLINICAL HISTORY: abdominal pain. Mid to lower back pain starting this a.m., now radiating into lower abdomen. Recently EDC from hospital after blood transfusion. Dialysis patient. TECHNIQUE: Multiple axial images were obtained through the abdomen and pelvis without intravenous contrast. Coronal and sagittal images were reconstructed. All CT scans at this facility use dose modulation, iterative reconstruction, and/or weight based dosing when appropriate to reduce radiation dose to as low as reasonably achievable. COMPARISON: Previous abdomen and pelvis CT dated 5/3/2023. FINDINGS: Slight focal consolidation within the right lung base, and pneumonia is not excluded. Minimal hypoventilatory change within  the lung bases. Persistent moderate pericardial effusion, as on prior. ABDOMEN: Stomach: Within normal limits. Liver: No definable focal lesions. Mild hepatomegaly, with a craniocaudal liver length of approximately 18.1 cm. Gallbladder: Surgically absent. Pancreas: Grossly unremarkable. Spleen: Within normal limits. Adrenal glands: Within normal limits. Right kidney: No hydronephrosis. No renal or ureteral calculi. Left kidney: No hydronephrosis. No renal or ureteral calculi. Vascular structures: Vascular calcifications. Lymph nodes: Limited evaluation due to the lack of IV contrast. PELVIS: Small bowel: No significant distention. Appendix: Partially visualized, and appears unremarkable. Colon: No distention or acute pericolonic edema. Bladder: Circumferential bladder wall thickening. Correlation for possible cystitis could be done. Slight diffuse edema within the omentum, nonspecific. Minimal free fluid within the cul-de-sac, possibly physiologic fluid. No free intraperitoneal air. Mild generalized anasarca. Bones: No acute bone findings. Note that evaluation of the bowel and solid organs is somewhat limited due to lack of intravenous contrast. IMPRESSION: 1.  Circumferential bladder wall thickening. Correlation for possible cystitis could be done. 2.  Slight focal consolidation within the right lung base, and pneumonia is not excluded. 3.  Persistent moderate pericardial effusion, as on prior. 4.  Mild hepatomegaly. 5.  Slight diffuse edema within the omentum, nonspecific. 6.  Mild generalized anasarca. Electronically signed by:  Sadie Mares MD  5/15/2023 11:41 PM CDT Workstation: [id number]    CT Abdomen Pelvis  Without Contrast    Result Date: 4/21/2023  EXAMINATION: CT ABDOMEN PELVIS WITHOUT CONTRAST CLINICAL HISTORY: Abdominal pain, acute, nonlocalized; TECHNIQUE: Low dose axial images, sagittal and coronal reformations were obtained from the lung bases to the pubic symphysis.  Oral contrast was not  administered. COMPARISON: 04/10/2023 FINDINGS: There is patchy airspace disease and some interlobular septal thickening in the lung bases heart is enlarged and there is also pericardial fluid.  The tip a venous access catheter is present in the right atrium.  Cholecystectomy. Liver is 20 cm in length and spleen is 14 cm in length.  Remaining solid abdominal organs are grossly unremarkable on this noncontrast exam. There is no enteric contrast which limits bowel assessment.  Stomach is mildly distended.  Moderate degree of stool throughout the colon. Extensive atherosclerosis.  Unremarkable noncontrast uterus.  There is mild diffuse urinary bladder wall thickening. No acute osseous findings.     1. Urinary bladder wall thickening is nonspecific.  Cystitis and outlet obstruction are 2 considerations. 2. Colonic constipation is possible. 3. Hepatosplenomegaly. 4. Cardiomegaly with pericardial effusion. 5. Bibasilar airspace disease could reflect edema or multifocal pneumonia. Electronically signed by: Kaushik Gutierrez Date:    04/21/2023 Time:    08:47         Assessment/Plan:     Active Hospital Problems    Diagnosis  POA    *Lower abdominal pain [R10.30]  Yes     Priority: 1 - High    Chronic deep vein thrombosis (DVT) of left upper extremity [I82.722]  Yes    Anemia in ESRD (end-stage renal disease) [N18.6, D63.1]  Yes    Chronic congestive heart failure with left ventricular diastolic dysfunction [I50.32]  Yes    Acute hyperglycemia [R73.9]  Yes    Thrombocytopenia [D69.6]  Yes     Chronic    Uncontrolled hypertension [I10]  Yes    ESRD (end stage renal disease) [N18.6]  Yes    Seizure disorder [G40.909]  Yes     Chronic    Pericardial effusion [I31.39]  Yes      Resolved Hospital Problems   No resolved problems to display.     Plan:  Admit to observation - Multifocal pneumonia/Hyperglyemia w/DM II/ESRD  Continuous cardiac monitor  Pulse oximetry O2 PRN - keep sats >93%, pulse ox q4 with vital  signs  Empiric IV antibiotics, deescalate pending culture results   Nephrology consulted for HD  Cardiology consulted for persistent moderate pericardial effusion  Strict I/O and daily weight  DUO-nebs q4 hours PRN  POC glucose checks AC and HS  Continue home basal insulin  Correction scale and adjust accordingly  Hypoglycemia Protocol  Daily labs  Electrolytes sliding scale repletion  Continue chronic home medications  Further plan as per clinical course    VTE Risk Mitigation (From admission, onward)         Ordered     apixaban tablet 5 mg  2 times daily         05/16/23 0330     Reason for No Pharmacological VTE Prophylaxis  Once        Question:  Reasons:  Answer:  Already adequately anticoagulated on oral Anticoagulants    05/16/23 0330     IP VTE HIGH RISK PATIENT  Once         05/16/23 0330     Place sequential compression device  Until discontinued         05/16/23 0330                     On 05/16/2023, patient should be placed in hospital observation services under my care in collaboration with Dr. Humphries.      JACQUELINE Simeon  Department of Hospital Medicine  Formerly [facility] - Emergency Dept

## 2023-05-16 NOTE — SUBJECTIVE & OBJECTIVE
Past Medical History:   Diagnosis Date    ESRD on hemodialysis 2022    Gastritis 2022    EGD was 22    Gastroparesis 2022    has not had Emptying study    Heart failure with preserved ejection fraction 2022    EF 55% on 3/22    History of supraventricular tachycardia     Hyperkalemia 2022    Hypertensive emergency 2022    Sickle cell trait 2022    Type 2 diabetes mellitus        Past Surgical History:   Procedure Laterality Date     SECTION      x 3    COLONOSCOPY      COLONOSCOPY N/A 2022    Procedure: COLONOSCOPY;  Surgeon: Jagdeep Cedeno MD;  Location: [facility];  Service: Endoscopy;  Laterality: N/A;    ESOPHAGOGASTRODUODENOSCOPY N/A 10/18/2019    Procedure: ESOPHAGOGASTRODUODENOSCOPY (EGD);  Surgeon: Gianluca Mendez MD;  Location: [facility];  Service: Endoscopy;  Laterality: N/A;    ESOPHAGOGASTRODUODENOSCOPY N/A 2022    Procedure: EGD (ESOPHAGOGASTRODUODENOSCOPY);  Surgeon: Micky Paredes III, MD;  Location: [facility];  Service: Endoscopy;  Laterality: N/A;    ESOPHAGOGASTRODUODENOSCOPY N/A 2022    Procedure: EGD (ESOPHAGOGASTRODUODENOSCOPY);  Surgeon: Marcelo Zhong MD;  Location: [facility];  Service: Endoscopy;  Laterality: N/A;    LAPAROSCOPIC CHOLECYSTECTOMY N/A 2022    Procedure: CHOLECYSTECTOMY, LAPAROSCOPIC;  Surgeon: Grey Perez MD;  Location: [facility];  Service: General;  Laterality: N/A;    PLACEMENT OF DUAL-LUMEN VASCULAR CATHETER Left 2022    Procedure: INSERTION-CATHETER-JOSEPH;  Surgeon: Dionte Gan MD;  Location: [facility] OR;  Service: General;  Laterality: Left;    PLACEMENT OF DUAL-LUMEN VASCULAR CATHETER Right 2022    Procedure: INSERTION-CATHETER-Hemosplit;  Surgeon: Dionte Gan MD;  Location: [facility] OR;  Service: General;  Laterality: Right;       Review of patient's allergies indicates:   Allergen Reactions    Penicillins Hives       No current facility-administered medications on file  prior to encounter.     Current Outpatient Medications on File Prior to Encounter   Medication Sig    amLODIPine (NORVASC) 5 MG tablet Take 2 tablets (10 mg total) by mouth once daily.    apixaban (ELIQUIS) 5 mg Tab Take 1 tablet (5 mg total) by mouth 2 (two) times daily.    carvediloL (COREG) 25 MG tablet Take 1 tablet twice a day by oral route for 30 days. (Patient taking differently: Take 25 mg by mouth 2 (two) times daily.)    dicyclomine (BENTYL) 10 MG capsule Take 1 capsule (10 mg total) by mouth 4 (four) times daily as needed (abdominal pain).    FLUoxetine 20 MG capsule Take 1 capsule every day by oral route for 30 days. (Patient taking differently: Take 20 mg by mouth once daily.)    gabapentin (NEURONTIN) 100 MG capsule Take 1 capsule by mouth 3 times daily (Patient taking differently: Take 100 mg by mouth 3 (three) times daily.)    hydrALAZINE (APRESOLINE) 100 MG tablet Take 1 tablet (100 mg total) by mouth every 8 (eight) hours.    insulin aspart U-100 (NOVOLOG) 100 unit/mL (3 mL) InPn pen Inject 4 Units into the skin 3 (three) times daily with meals.    insulin detemir U-100, Levemir, 100 unit/mL (3 mL) SubQ InPn pen Inject 6 Units into the skin 2 (two) times daily.    isosorbide mononitrate (IMDUR) 30 MG 24 hr tablet Take 3 tablets (90 mg total) by mouth once daily.    levETIRAcetam (KEPPRA) 500 MG Tab Take 1 tablet twice a day by oral route for 30 days. (Patient taking differently: Take 500 mg by mouth 2 (two) times daily.)    ondansetron (ZOFRAN-ODT) 4 MG TbDL Take 1 tablet (4 mg total) by mouth every 6 (six) hours as needed.    pantoprazole (PROTONIX) 40 MG tablet Take 1 tablet every day by oral route for 30 days. (Patient taking differently: Take 40 mg by mouth once daily.)    sucralfate (CARAFATE) 100 mg/mL suspension Take 10 mLs (1 g total) by mouth 4 (four) times daily before meals and nightly.    amitriptyline (ELAVIL) 50 MG tablet Take 50 mg by mouth every evening.    pen needle, diabetic 31  "gauge x 3/16" Ndle Use as directed to inject insulin 5 times daily    [DISCONTINUED] atenoloL (TENORMIN) 50 MG tablet Take 1 tablet (50 mg total) by mouth every other day.    [DISCONTINUED] omeprazole (PRILOSEC) 20 MG capsule Take 2 capsules (40 mg total) by mouth once daily. for 10 days     Family History       Problem Relation (Age of Onset)    Diabetes Mother, Father          Tobacco Use    Smoking status: Never    Smokeless tobacco: Never   Substance and Sexual Activity    Alcohol use: Not Currently    Drug use: No    Sexual activity: Not Currently     Partners: Male     Birth control/protection: I.U.D.     Review of Systems   Constitutional:  Negative for chills, diaphoresis and fever.   HENT:  Negative for congestion, postnasal drip, sinus pressure and sore throat.    Eyes:  Negative for visual disturbance.   Respiratory:  Negative for cough, chest tightness, shortness of breath and wheezing.    Cardiovascular:  Negative for chest pain, palpitations and leg swelling.   Gastrointestinal:  Positive for abdominal pain, nausea and vomiting. Negative for abdominal distention, blood in stool, constipation and diarrhea.   Endocrine: Negative.    Genitourinary:  Negative for dysuria.   Musculoskeletal: Negative.    Skin: Negative.    Allergic/Immunologic: Negative.    Neurological:  Negative for dizziness, weakness, numbness and headaches.   Hematological: Negative.    Psychiatric/Behavioral: Negative.     Objective:     Vital Signs (Most Recent):  Temp: 98.7 °F (37.1 °C) (05/15/23 2040)  Pulse: 82 (05/16/23 0230)  Resp: 16 (05/15/23 2040)  BP: 134/75 (05/16/23 0230)  SpO2: 98 % (05/16/23 0230) Vital Signs (24h Range):  Temp:  [98.7 °F (37.1 °C)] 98.7 °F (37.1 °C)  Pulse:  [82-91] 82  Resp:  [16] 16  SpO2:  [86 %-100 %] 98 %  BP: (134-190)/() 134/75     Weight: 54 kg (119 lb)  Body mass index is 21.77 kg/m².     Physical Exam  Constitutional:       General: She is not in acute distress.     Appearance: Normal " appearance. She is well-developed. She is not toxic-appearing or diaphoretic.   HENT:      Head: Normocephalic and atraumatic.   Eyes:      General: Lids are normal.      Conjunctiva/sclera: Conjunctivae normal.      Pupils: Pupils are equal, round, and reactive to light.   Neck:      Thyroid: No thyroid mass or thyromegaly.      Vascular: Normal carotid pulses. No JVD.      Trachea: Trachea normal. No tracheal deviation.   Cardiovascular:      Rate and Rhythm: Normal rate and regular rhythm.      Pulses: Normal pulses.      Heart sounds: Normal heart sounds, S1 normal and S2 normal.   Pulmonary:      Effort: Pulmonary effort is normal.      Breath sounds: Normal breath sounds. No stridor.   Abdominal:      General: Bowel sounds are normal. There is no distension.      Palpations: Abdomen is soft.      Tenderness: There is abdominal tenderness in the right lower quadrant and left lower quadrant. There is no guarding or rebound.   Musculoskeletal:         General: Normal range of motion.      Cervical back: Full passive range of motion without pain, normal range of motion and neck supple.   Skin:     General: Skin is warm and dry.      Nails: There is no clubbing.   Neurological:      Mental Status: She is alert and oriented to person, place, and time.      Cranial Nerves: No cranial nerve deficit.      Sensory: No sensory deficit.   Psychiatric:         Speech: Speech normal.         Behavior: Behavior normal. Behavior is cooperative.         Thought Content: Thought content normal.         Judgment: Judgment normal.            CRANIAL NERVES     CN III, IV, VI   Pupils are equal, round, and reactive to light.     Significant Labs: All pertinent labs within the past 24 hours have been reviewed.  A1C:   Recent Labs   Lab 12/23/22  1413 03/03/23  0547   HGBA1C 7.5* 5.8     Bilirubin:   Recent Labs   Lab 04/24/23  0915 05/03/23  0930 05/11/23  1232 05/12/23  0419 05/15/23  2104   BILITOT 2.2* 1.4* 2.1* 1.7* 1.7*      BMP:   Recent Labs   Lab 05/15/23  2104 05/16/23  0214   * 340*   *  --    K 4.3  --    CL 93*  --    CO2 24  --    BUN 30*  --    CREATININE 5.6*  --    CALCIUM 9.1  --      CBC:   Recent Labs   Lab 05/15/23  2104   WBC 5.47   HGB 9.7*   HCT 27.6*   PLT 85*     CMP:   Recent Labs   Lab 05/15/23  2104 05/16/23  0214   *  --    K 4.3  --    CL 93*  --    CO2 24  --    * 340*   BUN 30*  --    CREATININE 5.6*  --    CALCIUM 9.1  --    PROT 6.9  --    ALBUMIN 3.4*  --    BILITOT 1.7*  --    ALKPHOS 178*  --    AST 23  --    ALT 22  --    ANIONGAP 16  --      TSH:   Recent Labs   Lab 02/08/23  0505   TSH 1.141       Significant Imaging: I have reviewed all pertinent imaging results/findings within the past 24 hours.  X-Ray Abdomen AP 1 View (KUB)    Result Date: 4/22/2023  EXAMINATION: XR ABDOMEN AP 1 VIEW CLINICAL HISTORY: Generalized abdominal pain TECHNIQUE: AP View(s) of the abdomen was performed. COMPARISON: Abdomen x-ray of March 13, 2023 FINDINGS: The bowel gas pattern is within normal limits.  Surgical clips in the right upper quadrant are consistent with prior cholecystectomy.  No free air is seen.  No masses or calcifications are identified.     Prior cholecystectomy.  Otherwise negative abdomen x-ray. Electronically signed by: Gianluca Arriaga MD Date:    04/22/2023 Time:    09:51    CTA Chest Non-Coronary (PE Studies)    Result Date: 5/3/2023  CMS MANDATED QUALITY DATA-CT RADIATION-436 HISTORY: Vomiting and chest pain. TECHNICAL FACTORS: Thin axial imaging through the chest was performed with 100 mL of Omnipaque 350 IV contrast, with sagittal and coronal images, MIPS, and or 3D reconstructions performed. All CT exams at this facility use dose modulation, iterative reconstruction, and or weight based dosing when appropriate to reduce radiation dose to as low as reasonably achievable. COMPARISON: 5/2/2023 FINDINGS: Examination is of diagnostic quality as there is normal  opacification of pulmonary arterial tree out to the tertiary order branch vessels without evidence of pruning, truncation, webbing the pulmonary arterial system. No evidence of acute pulmonary thromboembolism. Normal appearance the main pulmonary trunk without evidence of saddle embolus at the branch of the main pulmonary trunk. The pulmonary arteries and respective divisions are normal in caliber and distribution out to the tertiary branch vessels, however segment arteries at the level of the second and third order branches are somewhat limited by motion artifact and quantum adenoids. There is a small to moderate amount of pericardial fluid encircling the cardiac chamber is evidence of fairly moderate concentric thickening of the left ventricular wall secondary to systemic hypertension on the long-standing basis. No significant backflow contrast into the suprahepatic inferior vena cava suggestive of underlying cardiac decompensation. Small to moderate-sized sliding-type hiatal hernia is noted. Parenchymal lung windows settings demonstrate no evidence of mass or abnormal there are geographic areas of ground attenuation within the apical segments attributed towards atelectasis, pulmonary hemorrhage, and/or edema. Tracheal column appears midline without evidence of intraluminal occlusion. The upper abdominal cavity is unremarkable in CT appearance. No evidence of adrenal enlargement. IMPRESSION: 1. No CT evidence of acute pulmonary thromboembolism. 2. Moderate-sized pericardial effusion encircles the cardiac chambers similar in volume as compared to previous. 3. Geographic areas of prominence attenuation throughout the bilateral lung fields attributed towards the atelectasis, pneumonia, hemorrhage, or edema. The overall distribution appears unchanged upon comparison. Electronically signed by:  Deandre Vitale MD  5/3/2023 4:16 PM CDT Workstation: [id number]    CT Abdomen Pelvis With Contrast    Result Date:  5/3/2023  EXAM: CT ABDOMEN PELVIS WITH CONTRAST HISTORY: Abdominal pain, acute, nonlocalized. Vomiting. COMPARISON: CT scan of the abdomen and pelvis dated 3/10/2023. TECHNIQUE: Multiple axial 2 mm thick images were acquired through the abdomen and pelvis with IV contrast only. This exam was performed according to our departmental dose-optimization program, which includes automated exposure control, adjustment of the mA and/or kV according to patient size and/or use of iterative reconstruction technique. Unless otherwise stated, incidental findings do not require dedicated follow-up imaging. FINDINGS: Images through the lung bases show marked cardiomegaly and a moderate-sized pericardial effusion. The liver, spleen, pancreas, kidneys, and adrenal glands appear within normal limits. The gallbladder is absent. There is no bile duct dilatation. The walls of the stomach appears somewhat edematous and there is mild mucosal hyperenhancement and the stomach suspicious for gastritis. The small bowel is normal in caliber. There is no evidence of small bowel obstruction..  There is a moderately large amount of formed stool throughout the colon including  hyperdense, likely desiccated formed stool moderately distending the rectum. The stool burden has increased since the preceding CT scan. The findings are compatible with constipation and probable developing fecal impaction. The urinary bladder is well distended and appears grossly normal. The uterus is grossly normal. No abnormal cystic or solid masses are identified in the pelvis. IMPRESSION:   Probable mild gastritis. Large amount of formed stool throughout the colon including hyperdense stool distending the rectum suspicious for developing fecal impaction. Cardiomegaly. Moderate-sized pericardial effusion. Electronically signed by:  Gianluca Hudson MD  5/3/2023 4:25 PM CDT Workstation: [id number]    X-Ray Chest AP Portable    Result Date: 5/11/2023  Chest single view  CLINICAL DATA: Hypoxia, abdominal pain FINDINGS: Comparison to May 3. Cardiomegaly is stable. The mediastinum is unremarkable. Dual lead right IJ central catheter is unchanged in position. The lungs are clear. No osseous abnormalities are identified. IMPRESSION: 1. No acute process. 2. Stable cardiomegaly. Electronically signed by:  Zaid Wilson MD  5/11/2023 2:48 PM CDT Workstation: [id number]    X-Ray Chest AP Portable    Result Date: 5/3/2023  CLINICAL HISTORY: 33 years (1990) Female Chest Pain Vomiting; Chest Pain TECHNIQUE: Portable AP radiograph the chest. COMPARISON: None available. FINDINGS: The lungs are clear. No pneumothorax is identified. The cardiac silhouette is moderately enlarged. There is a right-sided IJ dual-lumen central venous catheter with distal tip at the level of the SVC. Osseous structures appear within normal limits. The visualized upper abdomen is unremarkable. IMPRESSION: No acute cardiac or pulmonary process. . Electronically signed by:  Deandre Shirley MD  5/3/2023 10:05 AM CDT Workstation: [id number]    X-Ray Chest AP Portable    Result Date: 4/22/2023  EXAMINATION: XR CHEST AP PORTABLE CLINICAL HISTORY: Shortness of breath TECHNIQUE: Single frontal view of the chest was performed. COMPARISON: Chest x-ray of April 17, 2022 FINDINGS: A double lumen central line enters on the right and ends at the level of the right atrium.  There is cardiomegaly in a biventricular pattern.  Intrapulmonary mass or infiltrate is not seen.  There is no pneumothorax or pleural effusion.     Moderate cardiomegaly.  Double-lumen central line ends at the level of the right atrium. Electronically signed by: Gianluca Arriaga MD Date:    04/22/2023 Time:    09:52    X-Ray Chest AP Portable    Result Date: 4/17/2023  EXAMINATION: XR CHEST AP PORTABLE CLINICAL HISTORY: Chest Pain; TECHNIQUE: Single frontal view of the chest was performed. COMPARISON: April 10, 2023 FINDINGS: There is moderate  enlargement of the cardiac silhouette.  A right internal jugular dual lumen central venous dialysis catheter is in place.  No confluent infiltrates are seen.  The bony structures are unremarkable.     No definite acute cardiopulmonary disease in this patient with chronic cardiomegaly and a dual lumen central venous dialysis catheter in place. Electronically signed by: Thanh Davila MD Date:    04/17/2023 Time:    11:11    Echo    Result Date: 4/23/2023  · The left ventricle is normal in size with moderate concentric hypertrophy and low normal systolic function. · The estimated ejection fraction is 50%. · Normal right ventricular size with normal right ventricular systolic function. · Intermediate central venous pressure (8 mmHg). · Small circumferential pericardial effusion. Effusion is fluid. · Patient has small-to-moderate pericardial effusion anteriorly subcostally it is approximally 1.7 cm · There is no evidence of constriction or tamponade · No ventricular interdependence · Posterior fusion smaller than the anterior      CT Abdomen Pelvis  Without Contrast    Result Date: 5/15/2023  EXAM DESCRIPTION: CT ABDOMEN PELVIS WITHOUT IV CONTRAST CLINICAL HISTORY: abdominal pain. Mid to lower back pain starting this a.m., now radiating into lower abdomen. Recently EDC from hospital after blood transfusion. Dialysis patient. TECHNIQUE: Multiple axial images were obtained through the abdomen and pelvis without intravenous contrast. Coronal and sagittal images were reconstructed. All CT scans at this facility use dose modulation, iterative reconstruction, and/or weight based dosing when appropriate to reduce radiation dose to as low as reasonably achievable. COMPARISON: Previous abdomen and pelvis CT dated 5/3/2023. FINDINGS: Slight focal consolidation within the right lung base, and pneumonia is not excluded. Minimal hypoventilatory change within the lung bases. Persistent moderate pericardial effusion, as on prior.  ABDOMEN: Stomach: Within normal limits. Liver: No definable focal lesions. Mild hepatomegaly, with a craniocaudal liver length of approximately 18.1 cm. Gallbladder: Surgically absent. Pancreas: Grossly unremarkable. Spleen: Within normal limits. Adrenal glands: Within normal limits. Right kidney: No hydronephrosis. No renal or ureteral calculi. Left kidney: No hydronephrosis. No renal or ureteral calculi. Vascular structures: Vascular calcifications. Lymph nodes: Limited evaluation due to the lack of IV contrast. PELVIS: Small bowel: No significant distention. Appendix: Partially visualized, and appears unremarkable. Colon: No distention or acute pericolonic edema. Bladder: Circumferential bladder wall thickening. Correlation for possible cystitis could be done. Slight diffuse edema within the omentum, nonspecific. Minimal free fluid within the cul-de-sac, possibly physiologic fluid. No free intraperitoneal air. Mild generalized anasarca. Bones: No acute bone findings. Note that evaluation of the bowel and solid organs is somewhat limited due to lack of intravenous contrast. IMPRESSION: 1.  Circumferential bladder wall thickening. Correlation for possible cystitis could be done. 2.  Slight focal consolidation within the right lung base, and pneumonia is not excluded. 3.  Persistent moderate pericardial effusion, as on prior. 4.  Mild hepatomegaly. 5.  Slight diffuse edema within the omentum, nonspecific. 6.  Mild generalized anasarca. Electronically signed by:  Sadie Mares MD  5/15/2023 11:41 PM CDT Workstation: [id number]    CT Abdomen Pelvis  Without Contrast    Result Date: 4/21/2023  EXAMINATION: CT ABDOMEN PELVIS WITHOUT CONTRAST CLINICAL HISTORY: Abdominal pain, acute, nonlocalized; TECHNIQUE: Low dose axial images, sagittal and coronal reformations were obtained from the lung bases to the pubic symphysis.  Oral contrast was not administered. COMPARISON: 04/10/2023 FINDINGS: There is patchy airspace  disease and some interlobular septal thickening in the lung bases heart is enlarged and there is also pericardial fluid.  The tip a venous access catheter is present in the right atrium.  Cholecystectomy. Liver is 20 cm in length and spleen is 14 cm in length.  Remaining solid abdominal organs are grossly unremarkable on this noncontrast exam. There is no enteric contrast which limits bowel assessment.  Stomach is mildly distended.  Moderate degree of stool throughout the colon. Extensive atherosclerosis.  Unremarkable noncontrast uterus.  There is mild diffuse urinary bladder wall thickening. No acute osseous findings.     1. Urinary bladder wall thickening is nonspecific.  Cystitis and outlet obstruction are 2 considerations. 2. Colonic constipation is possible. 3. Hepatosplenomegaly. 4. Cardiomegaly with pericardial effusion. 5. Bibasilar airspace disease could reflect edema or multifocal pneumonia. Electronically signed by: Kaushik Gutierrez Date:    04/21/2023 Time:    08:47

## 2023-05-16 NOTE — PROGRESS NOTES
[facility] Medicine  Progress Note    Patient Name: Tabby Howard  MRN: [id number]  Patient Class: OP- Observation   Admission Date: 5/15/2023  Length of Stay: 0 days  Attending Physician: Roby Neves MD  Primary Care Provider: AIDEN CONNER NP        Subjective:     Principal Problem:Lower abdominal pain        HPI:  Tabby Howard is a 33 y.o. female with a history as  has a past medical history of ESRD on hemodialysis (04/12/2022), Gastritis (12/2022), Gastroparesis (04/12/2022), Heart failure with preserved ejection fraction (04/12/2022), History of supraventricular tachycardia, Hyperkalemia (07/07/2022), Hypertensive emergency (07/08/2022), Sickle cell trait (04/12/2022), and Type 2 diabetes mellitus. who presented to the ED with a Abdominal Pain (Pt states starting this am mid to lower back pain now radiating into her lower abdomen.  Pt recently dc from hospital after blood transfusion 2 days. Dialysis pt, Tuesday/Thursday/Saturday. Last dialysis Thursday. ) and Back Pain    Patient presents to the emergency room, via EMS, for evaluation of severe lower abdominal pain that started today with associated nausea and NBNB emesis.  On assessment patient very sleepy however arousable and responds appropriately to questions.  Per ED provider patient was given 10 mg of morphine per EMS EN route, which is likely contributing to her drowsiness.  Patient end-stage renal disease with HD Tuesday Thursday and Saturdays.      Patient recent hospital [facility] (05/11/2023 through 05/13/2023) treated for symptomatic anemia and was dialyzed during that time on 05/11/2023.  Patient was discharged 05/13/2023 which is a dialysis day and missed dialysis.     Lab and imaging obtained and reviewed.  CBC completed and shows RBCs 3.23 H/H 9.7/27.6 RDW 17.1 platelets 85 g % 78.8 lymph% 13.2.  Chemistry profile shows sodium 133 chloride 93 BUN 30 creatinine 5.6 GFR 9.6  glucose 399 alk phos 178 albumin 3.4 total albumin 1.7.  On admit, afebrile HR 91 R 16 /91 with initial sats 86% on room air in improving to 94-97% on 3 L O2 per nasal cannula.       CT abdomen pelvis  IMPRESSION:  1.  Circumferential bladder wall thickening. Correlation for possible cystitis could be done.  2.  Slight focal consolidation within the right lung base, and pneumonia is not excluded.  3.  Persistent moderate pericardial effusion, as on prior.  4.  Mild hepatomegaly.  5.  Slight diffuse edema within the omentum, nonspecific.  6.  Mild generalized anasarca.    Recent Echo  Summary   The left ventricle is normal in size with moderate concentric hypertrophy and low normal systolic function.   The estimated ejection fraction is 50%.   Normal right ventricular size with normal right ventricular systolic function.   Intermediate central venous pressure (8 mmHg).   Small circumferential pericardial effusion. Effusion is fluid.   Patient has small-to-moderate pericardial effusion anteriorly subcostally it is approximally 1.7 cm   There is no evidence of constriction or tamponade   No ventricular interdependence   Posterior fusion smaller than the anterior       Per ED provider patient presented to the emergency room via EMS for evaluation of abdominal pain.  EN route patient was given 2 mg of morphine and noted to have O2 sats in 80s.  She was placed on 2-3 L of oxygen per nasal cannula with improvement of oxygen saturation.  Labs reviewed in noted to be hyperglycemic treated with 7 units of regular insulin also noted to be hypertensive and given IV hydralazine 10 mg x 1.                 Overview/Hospital Course:  05/16  Assumed care. Chart reviewed. Labs reviewed and noted below: no leukocytosis with minimal normocytic anemia; trivial hyponatremia with end stage renal dysfunction; procalcitonin very minimally elevated. Patient seen on dialysis unit. Abdominal discomfort has improved. Feels  ""Better". No CP/SOB          Interval History: improving abdominal pain    Review of Systems   Constitutional:  Positive for fatigue.   HENT: Negative.     Eyes: Negative.    Respiratory: Negative.     Cardiovascular: Negative.    Gastrointestinal: Negative.    Endocrine: Negative.    Genitourinary: Negative.    Musculoskeletal: Negative.    Skin: Negative.    Allergic/Immunologic: Negative.    Neurological: Negative.    Hematological: Negative.    All other systems reviewed and are negative.  Objective:     Vital Signs (Most Recent):  Temp: 98 °F (36.7 °C) (05/16/23 1055)  Pulse: 72 (05/16/23 1200)  Resp: 16 (05/16/23 1055)  BP: (!) 90/54 (05/16/23 1200)  SpO2: 95 % (05/16/23 1055) Vital Signs (24h Range):  Temp:  [97.9 °F (36.6 °C)-98.7 °F (37.1 °C)] 98 °F (36.7 °C)  Pulse:  [72-91] 72  Resp:  [16-17] 16  SpO2:  [79 %-100 %] 95 %  BP: ()/() 90/54     Weight: 52.7 kg (116 lb 2.9 oz)  Body mass index is 21.25 kg/m².    Intake/Output Summary (Last 24 hours) at 5/16/2023 1351  Last data filed at 5/16/2023 0946  Gross per 24 hour   Intake 240 ml   Output --   Net 240 ml         Physical Exam  Vitals and nursing note reviewed.   Constitutional:       Appearance: She is well-developed.   HENT:      Head: Normocephalic and atraumatic.      Right Ear: External ear normal.      Left Ear: External ear normal.      Nose: Nose normal.   Eyes:      Conjunctiva/sclera: Conjunctivae normal.      Pupils: Pupils are equal, round, and reactive to light.   Cardiovascular:      Rate and Rhythm: Normal rate and regular rhythm.      Heart sounds: Normal heart sounds.   Pulmonary:      Effort: Pulmonary effort is normal.      Breath sounds: Normal breath sounds.   Abdominal:      General: Bowel sounds are normal.      Palpations: Abdomen is soft.   Musculoskeletal:         General: Normal range of motion.      Cervical back: Normal range of motion and neck supple.   Skin:     General: Skin is warm and dry.      Capillary " Refill: Capillary refill takes less than 2 seconds.   Neurological:      Mental Status: She is alert and oriented to person, place, and time.   Psychiatric:         Behavior: Behavior normal.         Thought Content: Thought content normal.         Judgment: Judgment normal.           Significant Labs: All pertinent labs within the past 24 hours have been reviewed.  BMP:   Recent Labs   Lab 05/16/23  0516   *   *   K 3.9   CL 98   CO2 27   BUN 32*   CREATININE 6.0*   CALCIUM 8.6*   MG 2.0     CBC:   Recent Labs   Lab 05/15/23  2104 05/16/23  0516   WBC 5.47 5.30   HGB 9.7* 11.0*   HCT 27.6* 32.5*   PLT 85* 108*       Significant Imaging: I have reviewed all pertinent imaging results/findings within the past 24 hours.      Assessment/Plan:      Chronic deep vein thrombosis (DVT) of left upper extremity    Continue current regimen    Anemia in ESRD (end-stage renal disease)  Eyrthropoeitin      Chronic congestive heart failure with left ventricular diastolic dysfunction    Echo    Interpretation Summary  · The left ventricle is normal in size with moderate concentric hypertrophy and low normal systolic function.  · The estimated ejection fraction is 50%.  · Normal right ventricular size with normal right ventricular systolic function.  · Intermediate central venous pressure (8 mmHg).  · Small circumferential pericardial effusion. Effusion is fluid.  · Patient has small-to-moderate pericardial effusion anteriorly subcostally it is approximally 1.7 cm  · There is no evidence of constriction or tamponade  · No ventricular interdependence  · Posterior fusion smaller than the anterior    Recent Labs   Lab 05/11/23  1232   BNP 1,935*   .      ESRD (end stage renal disease)  RRT      Pericardial effusion  Chronic; ECHO; Cardiology      Seizure disorder  Continue current regimen        VTE Risk Mitigation (From admission, onward)         Ordered     apixaban tablet 5 mg  2 times daily         05/16/23 5588      Reason for No Pharmacological VTE Prophylaxis  Once        Question:  Reasons:  Answer:  Already adequately anticoagulated on oral Anticoagulants    05/16/23 0330     IP VTE HIGH RISK PATIENT  Once         05/16/23 0330     Place sequential compression device  Until discontinued         05/16/23 0330                Discharge Planning   TATIANA: 5/17/2023     Code Status: Full Code   Is the patient medically ready for discharge?:     Reason for patient still in hospital (select all that apply): Patient trending condition, Laboratory test and Treatment                     Roby Neves MD  Department of Hospital Medicine   [facility]

## 2023-05-17 VITALS
TEMPERATURE: 98 F | BODY MASS INDEX: 21.38 KG/M2 | HEIGHT: 62 IN | RESPIRATION RATE: 18 BRPM | WEIGHT: 116.19 LBS | OXYGEN SATURATION: 95 % | HEART RATE: 84 BPM | DIASTOLIC BLOOD PRESSURE: 82 MMHG | SYSTOLIC BLOOD PRESSURE: 145 MMHG

## 2023-05-17 LAB
ALBUMIN SERPL BCP-MCNC: 3.1 G/DL (ref 3.5–5.2)
ALP SERPL-CCNC: 149 U/L (ref 55–135)
ALT SERPL W/O P-5'-P-CCNC: 21 U/L (ref 10–44)
ANION GAP SERPL CALC-SCNC: 7 MMOL/L (ref 8–16)
AST SERPL-CCNC: 22 U/L (ref 10–40)
BASOPHILS # BLD AUTO: 0.02 K/UL (ref 0–0.2)
BASOPHILS NFR BLD: 0.6 % (ref 0–1.9)
BILIRUB SERPL-MCNC: 1.1 MG/DL (ref 0.1–1)
BUN SERPL-MCNC: 23 MG/DL (ref 6–20)
CALCIUM SERPL-MCNC: 8.5 MG/DL (ref 8.7–10.5)
CHLORIDE SERPL-SCNC: 103 MMOL/L (ref 95–110)
CO2 SERPL-SCNC: 21 MMOL/L (ref 23–29)
CREAT SERPL-MCNC: 4.3 MG/DL (ref 0.5–1.4)
DIFFERENTIAL METHOD: ABNORMAL
EOSINOPHIL # BLD AUTO: 0.2 K/UL (ref 0–0.5)
EOSINOPHIL NFR BLD: 4.5 % (ref 0–8)
ERYTHROCYTE [DISTWIDTH] IN BLOOD BY AUTOMATED COUNT: 17.9 % (ref 11.5–14.5)
EST. GFR  (NO RACE VARIABLE): 13.2 ML/MIN/1.73 M^2
GLUCOSE SERPL-MCNC: 282 MG/DL (ref 70–110)
GLUCOSE SERPL-MCNC: 294 MG/DL (ref 70–110)
GLUCOSE SERPL-MCNC: 307 MG/DL (ref 70–110)
HCT VFR BLD AUTO: 27.9 % (ref 37–48.5)
HGB BLD-MCNC: 9.1 G/DL (ref 12–16)
IMM GRANULOCYTES # BLD AUTO: 0.01 K/UL (ref 0–0.04)
IMM GRANULOCYTES NFR BLD AUTO: 0.3 % (ref 0–0.5)
LYMPHOCYTES # BLD AUTO: 0.9 K/UL (ref 1–4.8)
LYMPHOCYTES NFR BLD: 26.3 % (ref 18–48)
MAGNESIUM SERPL-MCNC: 1.9 MG/DL (ref 1.6–2.6)
MCH RBC QN AUTO: 29.2 PG (ref 27–31)
MCHC RBC AUTO-ENTMCNC: 32.6 G/DL (ref 32–36)
MCV RBC AUTO: 89 FL (ref 82–98)
MONOCYTES # BLD AUTO: 0.3 K/UL (ref 0.3–1)
MONOCYTES NFR BLD: 9.6 % (ref 4–15)
NEUTROPHILS # BLD AUTO: 2.1 K/UL (ref 1.8–7.7)
NEUTROPHILS NFR BLD: 58.7 % (ref 38–73)
NRBC BLD-RTO: 0 /100 WBC
PHOSPHATE SERPL-MCNC: 4.4 MG/DL (ref 2.7–4.5)
PLATELET # BLD AUTO: 89 K/UL (ref 150–450)
PMV BLD AUTO: 10.4 FL (ref 9.2–12.9)
POTASSIUM SERPL-SCNC: 5.2 MMOL/L (ref 3.5–5.1)
PROT SERPL-MCNC: 6.6 G/DL (ref 6–8.4)
RBC # BLD AUTO: 3.12 M/UL (ref 4–5.4)
SODIUM SERPL-SCNC: 131 MMOL/L (ref 136–145)
WBC # BLD AUTO: 3.54 K/UL (ref 3.9–12.7)

## 2023-05-17 PROCEDURE — 25000003 PHARM REV CODE 250: Performed by: INTERNAL MEDICINE

## 2023-05-17 PROCEDURE — 25000003 PHARM REV CODE 250: Performed by: NURSE PRACTITIONER

## 2023-05-17 PROCEDURE — 85025 COMPLETE CBC W/AUTO DIFF WBC: CPT | Performed by: NURSE PRACTITIONER

## 2023-05-17 PROCEDURE — G0378 HOSPITAL OBSERVATION PER HR: HCPCS

## 2023-05-17 PROCEDURE — 84100 ASSAY OF PHOSPHORUS: CPT | Performed by: NURSE PRACTITIONER

## 2023-05-17 PROCEDURE — 80053 COMPREHEN METABOLIC PANEL: CPT | Performed by: NURSE PRACTITIONER

## 2023-05-17 PROCEDURE — 36415 COLL VENOUS BLD VENIPUNCTURE: CPT | Performed by: NURSE PRACTITIONER

## 2023-05-17 PROCEDURE — 83735 ASSAY OF MAGNESIUM: CPT | Performed by: NURSE PRACTITIONER

## 2023-05-17 RX ORDER — FLUOXETINE HYDROCHLORIDE 20 MG/1
20 CAPSULE ORAL DAILY
Qty: 30 CAPSULE | Refills: 5 | Status: SHIPPED | OUTPATIENT
Start: 2023-05-17 | End: 2024-02-07 | Stop reason: SDUPTHER

## 2023-05-17 RX ORDER — ISOSORBIDE MONONITRATE 30 MG/1
90 TABLET, EXTENDED RELEASE ORAL DAILY
Qty: 90 TABLET | Refills: 11 | Status: SHIPPED | OUTPATIENT
Start: 2023-05-18 | End: 2023-08-17 | Stop reason: SDUPTHER

## 2023-05-17 RX ORDER — SUCRALFATE 1 G/10ML
1 SUSPENSION ORAL
Qty: 1200 ML | Refills: 0 | Status: SHIPPED | OUTPATIENT
Start: 2023-05-17 | End: 2023-06-12 | Stop reason: SDUPTHER

## 2023-05-17 RX ORDER — DICYCLOMINE HYDROCHLORIDE 10 MG/1
10 CAPSULE ORAL 4 TIMES DAILY PRN
Qty: 20 CAPSULE | Refills: 0 | Status: ON HOLD | OUTPATIENT
Start: 2023-05-17 | End: 2023-06-17 | Stop reason: HOSPADM

## 2023-05-17 RX ORDER — HYDROCODONE BITARTRATE AND ACETAMINOPHEN 7.5; 325 MG/1; MG/1
1 TABLET ORAL EVERY 6 HOURS PRN
Qty: 30 TABLET | Refills: 0 | Status: ON HOLD | OUTPATIENT
Start: 2023-05-17 | End: 2023-09-11 | Stop reason: HOSPADM

## 2023-05-17 RX ORDER — CARVEDILOL 25 MG/1
25 TABLET ORAL 2 TIMES DAILY
Qty: 60 TABLET | Refills: 5 | Status: SHIPPED | OUTPATIENT
Start: 2023-05-17 | End: 2024-02-25 | Stop reason: SDUPTHER

## 2023-05-17 RX ADMIN — HYDRALAZINE HYDROCHLORIDE 100 MG: 25 TABLET ORAL at 06:05

## 2023-05-17 RX ADMIN — APIXABAN 5 MG: 5 TABLET, FILM COATED ORAL at 09:05

## 2023-05-17 RX ADMIN — GABAPENTIN 100 MG: 100 CAPSULE ORAL at 09:05

## 2023-05-17 RX ADMIN — AMLODIPINE BESYLATE 10 MG: 5 TABLET ORAL at 09:05

## 2023-05-17 RX ADMIN — PANTOPRAZOLE SODIUM 40 MG: 40 TABLET, DELAYED RELEASE ORAL at 06:05

## 2023-05-17 RX ADMIN — INSULIN DETEMIR 6 UNITS: 100 INJECTION, SOLUTION SUBCUTANEOUS at 09:05

## 2023-05-17 RX ADMIN — SUCRALFATE 1 G: 1 SUSPENSION ORAL at 06:05

## 2023-05-17 RX ADMIN — ISOSORBIDE MONONITRATE 90 MG: 60 TABLET, EXTENDED RELEASE ORAL at 09:05

## 2023-05-17 RX ADMIN — LEVETIRACETAM 500 MG: 500 TABLET, FILM COATED ORAL at 09:05

## 2023-05-17 RX ADMIN — SUCRALFATE 1 G: 1 SUSPENSION ORAL at 11:05

## 2023-05-17 RX ADMIN — ACETAMINOPHEN 650 MG: 325 TABLET ORAL at 06:05

## 2023-05-17 RX ADMIN — DIPHENHYDRAMINE HYDROCHLORIDE 25 MG: 25 CAPSULE ORAL at 12:05

## 2023-05-17 RX ADMIN — FLUOXETINE 20 MG: 20 CAPSULE ORAL at 09:05

## 2023-05-17 RX ADMIN — CARVEDILOL 25 MG: 25 TABLET, FILM COATED ORAL at 09:05

## 2023-05-17 RX ADMIN — INSULIN ASPART 4 UNITS: 100 INJECTION, SOLUTION INTRAVENOUS; SUBCUTANEOUS at 11:05

## 2023-05-17 NOTE — DISCHARGE SUMMARY
[facility] Medicine  Discharge Summary      Patient Name: Tabby Howard  MRN: [id number]  [facility]: [id number]  Patient Class: OP- Observation  Admission Date: 5/15/2023  Hospital Length of Stay: 0 days  Discharge Date and Time:  05/17/2023 10:39 AM  Attending Physician: Roby Neves MD   Discharging Provider: Roby Neves MD  Primary Care Provider: AIDEN CONNER NP    Primary Care Team: Networked reference to record PCT     HPI:   Tabby Howard is a 33 y.o. female with a history as  has a past medical history of ESRD on hemodialysis (04/12/2022), Gastritis (12/2022), Gastroparesis (04/12/2022), Heart failure with preserved ejection fraction (04/12/2022), History of supraventricular tachycardia, Hyperkalemia (07/07/2022), Hypertensive emergency (07/08/2022), Sickle cell trait (04/12/2022), and Type 2 diabetes mellitus. who presented to the ED with a Abdominal Pain (Pt states starting this am mid to lower back pain now radiating into her lower abdomen.  Pt recently dc from hospital after blood transfusion 2 days. Dialysis pt, Tuesday/Thursday/Saturday. Last dialysis Thursday. ) and Back Pain    Patient presents to the emergency room, via EMS, for evaluation of severe lower abdominal pain that started today with associated nausea and NBNB emesis.  On assessment patient very sleepy however arousable and responds appropriately to questions.  Per ED provider patient was given 10 mg of morphine per EMS EN route, which is likely contributing to her drowsiness.  Patient end-stage renal disease with HD Tuesday Thursday and Saturdays.      Patient recent hospital admission to [facility] access hospital (05/11/2023 through 05/13/2023) treated for symptomatic anemia and was dialyzed during that time on 05/11/2023.  Patient was discharged 05/13/2023 which is a dialysis day and missed dialysis.     Lab and imaging obtained and reviewed.  CBC completed and shows RBCs 3.23 H/H 9.7/27.6 RDW 17.1  platelets 85 g % 78.8 lymph% 13.2.  Chemistry profile shows sodium 133 chloride 93 BUN 30 creatinine 5.6 GFR 9.6 glucose 399 alk phos 178 albumin 3.4 total albumin 1.7.  On admit, afebrile HR 91 R 16 /91 with initial sats 86% on room air in improving to 94-97% on 3 L O2 per nasal cannula.       CT abdomen pelvis  IMPRESSION:  1.  Circumferential bladder wall thickening. Correlation for possible cystitis could be done.  2.  Slight focal consolidation within the right lung base, and pneumonia is not excluded.  3.  Persistent moderate pericardial effusion, as on prior.  4.  Mild hepatomegaly.  5.  Slight diffuse edema within the omentum, nonspecific.  6.  Mild generalized anasarca.    Recent Echo  Summary   The left ventricle is normal in size with moderate concentric hypertrophy and low normal systolic function.   The estimated ejection fraction is 50%.   Normal right ventricular size with normal right ventricular systolic function.   Intermediate central venous pressure (8 mmHg).   Small circumferential pericardial effusion. Effusion is fluid.   Patient has small-to-moderate pericardial effusion anteriorly subcostally it is approximally 1.7 cm   There is no evidence of constriction or tamponade   No ventricular interdependence   Posterior fusion smaller than the anterior       Per ED provider patient presented to the emergency room via EMS for evaluation of abdominal pain.  EN route patient was given 2 mg of morphine and noted to have O2 sats in 80s.  She was placed on 2-3 L of oxygen per nasal cannula with improvement of oxygen saturation.  Labs reviewed in noted to be hyperglycemic treated with 7 units of regular insulin also noted to be hypertensive and given IV hydralazine 10 mg x 1.                 * No surgery found *      Hospital Course:   05/16  Assumed care. Chart reviewed. Labs reviewed and noted below: no leukocytosis with minimal normocytic anemia; trivial hyponatremia with end stage renal  "dysfunction; procalcitonin very minimally elevated. Patient seen on dialysis unit. Abdominal discomfort has improved. Feels "Better". No CP/SOB    05/17  Patient feeling "Well this AM". Discussd "Pericardial Effusion" with Cardiology: is chronic and not evolving: no further work-up currently, except Hematology opinion about amyloidosis. No CP/SOB. Abdominal pain has improved. Discussed diabetic neuropathy with patient (who has retinal damage, ESRD and peripheral neuropathy already)--that this may very well be the etiology of her recurring abdominal pain, which waxes and wanes for patient, and if so, very little can be done.  VSS  Lung: decreased entry without adventitious sounds  Heart S1S2  Abdo soft  Imp ESRD, Diabetic Neuropathy, Chronic Pericardial Effusion; Possible amyloidosis  Plan discharge on preadmit regimen as per discharge med rec; cardiac/DM/renal diet; act as tolerated; Heme opinion; continue RRT scheduled as outpatient       Goals of Care Treatment Preferences:  Code Status: Full Code    Health care agent: Step-Mother Tanya Howard / Father Garcia Howard  Health care agent number: [phone] / [phone]          What is most important right now is to focus on remaining as independent as possible.  Accordingly, we have decided that the best plan to meet the patient's goals includes continuing with treatment.      Consults:   Consults (From admission, onward)        Status Ordering Provider     Inpatient consult to Cardiology  Once        Provider:  Joce Tompkins MD    Completed RITA RATLIFF     Inpatient consult to Nephrology  Once        Provider:  Prateek Clark MD    Completed RITA RATLIFF          No new Assessment & Plan notes have been filed under this hospital service since the last note was generated.  Service: Hospital Medicine    Final Active Diagnoses:    Diagnosis Date Noted POA    Chronic deep vein thrombosis (DVT) of left upper extremity [I82.722] 04/10/2023 Yes "    Anemia in ESRD (end-stage renal disease) [N18.6, D63.1] 03/20/2023 Yes    Chronic congestive heart failure with left ventricular diastolic dysfunction [I50.32] 03/02/2023 Yes    Thrombocytopenia [D69.6] 10/26/2022 Yes     Chronic    Uncontrolled hypertension [I10] 07/30/2022 Yes    ESRD (end stage renal disease) [N18.6] 07/22/2022 Yes    Seizure disorder [G40.909] 05/29/2022 Yes     Chronic    Pericardial effusion [I31.39] 03/07/2022 Yes      Problems Resolved During this Admission:    Diagnosis Date Noted Date Resolved POA    PRINCIPAL PROBLEM:  Lower abdominal pain [R10.30] 01/24/2023 05/16/2023 Yes    Acute hyperglycemia [R73.9] 10/26/2022 05/16/2023 Yes    Multifocal pneumonia [J18.9] 07/22/2022 05/16/2023 Yes       Discharged Condition: good    Disposition: Home or Self Care    Follow Up:   Follow-up Information     [facility] Follow up.    Why: Access will call pt with appointment details.  Contact information:  [address] [facility] [address]  [phone]             AIDEN CONNER NP Follow up in 1 week(s).    Specialty: Nurse Practitioner  Contact information:  [address] [facility]  [place] [address]  [phone]             Kaushik Zazueta MD Follow up in 2 week(s).    Specialties: Hematology, Oncology, Hematology and Oncology  Why: Post discharge follow-up; establish for evaluation of possible amyloidosis as per Cardiology  Contact information:  [address] [facility]  [address]  [place] [address]  [phone]                       Patient Instructions:      Diet diabetic     Diet renal     Activity as tolerated       Significant Diagnostic Studies: Labs:   BMP:   Recent Labs   Lab 05/15/23  2104 05/16/23  0214 05/16/23  0516 05/17/23  0517   * 340* 143* 294*   *  --  133* 131*   K 4.3  --  3.9 5.2*   CL 93*  --  98 103   CO2 24  --  27 21*   BUN 30*  --  32* 23*   CREATININE 5.6*  --  6.0* 4.3*   CALCIUM 9.1  --  8.6* 8.5*   MG  --   --   "2.0 1.9    and CBC   Recent Labs   Lab 05/15/23  2104 05/16/23  0516 05/17/23  0516   WBC 5.47 5.30 3.54*   HGB 9.7* 11.0* 9.1*   HCT 27.6* 32.5* 27.9*   PLT 85* 108* 89*       Pending Diagnostic Studies:     Procedure Component Value Units Date/Time    Echo [[id number]]     Order Status: Sent Lab Status: No result          Medications:  Reconciled Home Medications:      Medication List      START taking these medications    HYDROcodone-acetaminophen 7.5-325 mg per tablet  Commonly known as: NORCO  Take 1 tablet by mouth every 6 (six) hours as needed for Pain.        CONTINUE taking these medications    amLODIPine 5 MG tablet  Commonly known as: NORVASC  Take 2 tablets (10 mg total) by mouth once daily.     BD ULTRA-FINE MINI PEN NEEDLE 31 gauge x 3/16" Ndle  Generic drug: pen needle, diabetic  Use as directed to inject insulin 5 times daily     carvediloL 25 MG tablet  Commonly known as: COREG  Take 1 tablet (25 mg total) by mouth 2 (two) times daily.     dicyclomine 10 MG capsule  Commonly known as: BENTYL  Take 1 capsule (10 mg total) by mouth 4 (four) times daily as needed (abdominal pain).     ELIQUIS 5 mg Tab  Generic drug: apixaban  Take 1 tablet (5 mg total) by mouth 2 (two) times daily.     FLUoxetine 20 MG capsule  Take 1 capsule (20 mg total) by mouth once daily.     gabapentin 100 MG capsule  Commonly known as: NEURONTIN  Take 1 capsule by mouth 3 times daily     hydrALAZINE 100 MG tablet  Commonly known as: APRESOLINE  Take 1 tablet (100 mg total) by mouth every 8 (eight) hours.     insulin detemir U-100 (Levemir) 100 unit/mL (3 mL) Inpn pen  Inject 6 Units into the skin 2 (two) times daily.     isosorbide mononitrate 30 MG 24 hr tablet  Commonly known as: IMDUR  Take 3 tablets (90 mg total) by mouth once daily.  Start taking on: May 18, 2023     levETIRAcetam 500 MG Tab  Commonly known as: KEPPRA  Take 1 tablet twice a day by oral route for 30 days.     NovoLOG FlexPen U-100 Insulin 100 unit/mL (3 mL) " Inpn pen  Generic drug: insulin aspart U-100  Inject 4 Units into the skin 3 (three) times daily with meals.     pantoprazole 40 MG tablet  Commonly known as: PROTONIX  Take 1 tablet every day by oral route for 30 days.     sucralfate 100 mg/mL suspension  Commonly known as: CARAFATE  Take 10 mLs (1 g total) by mouth 4 (four) times daily before meals and nightly.        STOP taking these medications    amitriptyline 50 MG tablet  Commonly known as: ELAVIL     ondansetron 4 MG Tbdl  Commonly known as: ZOFRAN-ODT            Indwelling Lines/Drains at time of discharge:   Lines/Drains/Airways     Central Venous Catheter Line  Duration                Hemodialysis Catheter 12/09/22 right subclavian 159 days                Time spent on the discharge of patient: 32  minutes         Roby Neves MD  Department of Hospital Medicine  [facility]

## 2023-05-17 NOTE — PLAN OF CARE
Patient cleared for discharge from case management standpoint.    Follow up appointments scheduled and added to AVS. E mail sent to Veronica at [facility] requesting hospital follow up appointment. [facility] will call pt with PCP appointment details.    Chart and discharge orders reviewed.  Patient discharged home with no further case management needs.       05/17/23 1030   Final Note   Assessment Type Final Discharge Note   Anticipated Discharge Disposition Home   Hospital Resources/Appts/Education Provided Provided patient/caregiver with written discharge plan information;Appointments scheduled and added to AVS   Post-Acute Status   Discharge Delays (!) Personal Transportation

## 2023-05-17 NOTE — PROGRESS NOTES
INPATIENT NEPHROLOGY Progress Note   [facility]    Patient Name: Tabby Howard  Date: 05/17/2023    Reason for consultation: ESRD    Chief Complaint:   Chief Complaint   Patient presents with    Abdominal Pain     Pt states starting this am mid to lower back pain now radiating into her lower abdomen.  Pt recently dc from hospital after blood transfusion 2 days. Dialysis pt, Tuesday/Thursday/Saturday. Last dialysis Thursday.     Back Pain       History of Present Illness:  ESRD patient on TTS who presents to the emergency room, via EMS, for evaluation of severe lower abdominal pain that started today with associated nausea and NBNB emesis. Patient recent hospital admission to [facility] (05/11/2023 through 05/13/2023) treated for symptomatic anemia and was dialyzed during that time on 05/11/2023.  Patient was discharged 05/13/2023 which is a dialysis day and missed dialysis. Getting treatment for possible UTI. Consulted for dialysis.    Interval History:  5/16- seen on HD, low BP- minimal UF   5/17- BP better this AM, going home today    Plan of Care:    Assessment:  ESRD on HD TTS  HTN/D CHF/Chronic pericardial effusion  Hyponatremia  SHPT  Anemia of CKD    Plan:    - HD TTS  - continue home BP meds  - UF 2-3L  - renal diet, 1.5L fluid restriction  - not on binders  - start SHELLEY with next HD    Thank you for allowing us to participate in this patient's care. We will continue to follow.    Vital Signs:  Temp Readings from Last 3 Encounters:   05/17/23 98.4 °F (36.9 °C) (Oral)   05/12/23 98.3 °F (36.8 °C) (Oral)   05/03/23 97.7 °F (36.5 °C) (Oral)       Pulse Readings from Last 3 Encounters:   05/17/23 82   05/12/23 74   05/03/23 99       BP Readings from Last 3 Encounters:   05/17/23 133/78   05/12/23 104/64   05/03/23 (!) 180/98       Weight:  Wt Readings from Last 3 Encounters:   05/16/23 52.7 kg (116 lb 2.9 oz)   05/16/23 52.6 kg (116 lb)   05/12/23 48.7 kg (107 lb 5.8 oz)        Medications:  Scheduled Meds:   amLODIPine  10 mg Oral Daily    apixaban  5 mg Oral BID    carvediloL  25 mg Oral BID    FLUoxetine  20 mg Oral Daily    gabapentin  100 mg Oral TID    hydrALAZINE  100 mg Oral Q8H    insulin detemir U-100 (Levemir)  6 Units Subcutaneous BID    isosorbide mononitrate  90 mg Oral Daily    levETIRAcetam  500 mg Oral BID    pantoprazole  40 mg Oral Before breakfast    polyethylene glycol  17 g Oral Daily    sucralfate  1 g Oral QID (AC & HS)     Continuous Infusions:  PRN Meds:.acetaminophen, albuterol-ipratropium, dextrose 50%, dextrose 50%, dicyclomine, diphenhydrAMINE, glucagon (human recombinant), glucose, glucose, insulin aspart U-100, naloxone, ondansetron, sodium chloride 0.9%  No current facility-administered medications on file prior to encounter.     Current Outpatient Medications on File Prior to Encounter   Medication Sig Dispense Refill    amLODIPine (NORVASC) 5 MG tablet Take 2 tablets (10 mg total) by mouth once daily. 30 tablet 0    apixaban (ELIQUIS) 5 mg Tab Take 1 tablet (5 mg total) by mouth 2 (two) times daily. 60 tablet 2    carvediloL (COREG) 25 MG tablet Take 1 tablet twice a day by oral route for 30 days. (Patient taking differently: Take 25 mg by mouth 2 (two) times daily.) 60 tablet 2    [] dicyclomine (BENTYL) 10 MG capsule Take 1 capsule (10 mg total) by mouth 4 (four) times daily as needed (abdominal pain). 20 capsule 0    FLUoxetine 20 MG capsule Take 1 capsule every day by oral route for 30 days. (Patient taking differently: Take 20 mg by mouth once daily.) 30 capsule 2    gabapentin (NEURONTIN) 100 MG capsule Take 1 capsule by mouth 3 times daily (Patient taking differently: Take 100 mg by mouth 3 (three) times daily.) 90 capsule 2    hydrALAZINE (APRESOLINE) 100 MG tablet Take 1 tablet (100 mg total) by mouth every 8 (eight) hours. 60 tablet 0    insulin aspart U-100 (NOVOLOG) 100 unit/mL (3 mL) InPn pen Inject 4 Units into the skin 3  "(three) times daily with meals. 15 mL 3    insulin detemir U-100, Levemir, 100 unit/mL (3 mL) SubQ InPn pen Inject 6 Units into the skin 2 (two) times daily. 15 mL 3    isosorbide mononitrate (IMDUR) 30 MG 24 hr tablet Take 3 tablets (90 mg total) by mouth once daily. 90 tablet 1    levETIRAcetam (KEPPRA) 500 MG Tab Take 1 tablet twice a day by oral route for 30 days. (Patient taking differently: Take 500 mg by mouth 2 (two) times daily.) 60 tablet 2    ondansetron (ZOFRAN-ODT) 4 MG TbDL Take 1 tablet (4 mg total) by mouth every 6 (six) hours as needed. 20 tablet 0    pantoprazole (PROTONIX) 40 MG tablet Take 1 tablet every day by oral route for 30 days. (Patient taking differently: Take 40 mg by mouth once daily.) 30 tablet 2    [] sucralfate (CARAFATE) 100 mg/mL suspension Take 10 mLs (1 g total) by mouth 4 (four) times daily before meals and nightly. 1200 mL 0    amitriptyline (ELAVIL) 50 MG tablet Take 50 mg by mouth every evening.      pen needle, diabetic 31 gauge x 3/16" Ndle Use as directed to inject insulin 5 times daily 100 each 11    [DISCONTINUED] atenoloL (TENORMIN) 50 MG tablet Take 1 tablet (50 mg total) by mouth every other day. 45 tablet 3    [DISCONTINUED] omeprazole (PRILOSEC) 20 MG capsule Take 2 capsules (40 mg total) by mouth once daily. for 10 days 20 capsule 0       Review of Systems:  Neg    Physical Exam:  General Appearance:    NAD, AAO x 3, cooperative, appears stated age   Head:    Normocephalic, atraumatic   Eyes:    PER, EOMI, and conjunctiva/sclera clear bilaterally       Mouth:   Moist mucus membranes, no thrush or oral lesions,       normal dentition   Back:     No CVA tenderness   Lungs:     Clear to auscultation bilaterally, no wheezes, crackles,           rales or rhonchi, symmetric air movement, respirations unlabored   Chest wall:    No tenderness or deformity   Heart:    Regular rate and rhythm, S1 and S2 normal, no murmur, rub   or gallop   Abdomen:     Soft, " non-tender, non-distended, bowel sounds active all four   quadrants, no RT or guarding, no masses, no organomegaly   Extremities:   Warm and well perfused, distal pulses are intact, no             cyanosis or peripheral edema   MSK:   No joint or muscle swelling, tenderness or deformity   Skin:   Skin color, texture, turgor normal, no rashes or lesions   Neurologic/Psychiatric:   CNII-XII intact, normal strength and sensation       throughout, no asterixis; normal affect, memory, judgement     and insight      Results:  Lab Results   Component Value Date     (L) 05/17/2023    K 5.2 (H) 05/17/2023     05/17/2023    CO2 21 (L) 05/17/2023    BUN 23 (H) 05/17/2023    CREATININE 4.3 (H) 05/17/2023    CALCIUM 8.5 (L) 05/17/2023    ANIONGAP 7 (L) 05/17/2023    ESTGFRAFRICA 20 (A) 07/31/2022    EGFRNONAA 18 (A) 07/31/2022       Lab Results   Component Value Date    CALCIUM 8.5 (L) 05/17/2023    PHOS 4.4 05/17/2023       Recent Labs   Lab 05/17/23  0516   WBC 3.54*   RBC 3.12*   HGB 9.1*   HCT 27.9*   PLT 89*   MCV 89   MCH 29.2   MCHC 32.6         I have personally reviewed pertinent radiological imaging and reports.    I have spent > 35 minutes providing care for this patient for the above diagnoses. These services have included chart/data/imaging review, evaluation, exam, formulation of plan, , note preparation, and discussions with staff involved in this patient's care.    Prateek Clark MD MPH  [facility]  [address] [place] [id number]  [phone] (p)  [phone] (f)

## 2023-06-03 ENCOUNTER — HOSPITAL ENCOUNTER (OUTPATIENT)
Facility: HOSPITAL | Age: 33
Discharge: HOME | End: 2023-06-04
Attending: EMERGENCY MEDICINE | Admitting: HOSPITALIST
Payer: MEDICAID

## 2023-06-03 DIAGNOSIS — R10.9 ABDOMINAL PAIN: ICD-10-CM

## 2023-06-03 DIAGNOSIS — R07.9 CHEST PAIN: ICD-10-CM

## 2023-06-03 DIAGNOSIS — N18.6 ESRD (END STAGE RENAL DISEASE): ICD-10-CM

## 2023-06-03 DIAGNOSIS — E87.70 HYPERVOLEMIA, UNSPECIFIED HYPERVOLEMIA TYPE: Primary | ICD-10-CM

## 2023-06-03 LAB
ALBUMIN SERPL BCP-MCNC: 3.7 G/DL (ref 3.5–5.2)
ALLENS TEST: ABNORMAL
ALP SERPL-CCNC: 191 U/L (ref 55–135)
ALT SERPL W/O P-5'-P-CCNC: 29 U/L (ref 10–44)
ANION GAP SERPL CALC-SCNC: 11 MMOL/L (ref 8–16)
AST SERPL-CCNC: 28 U/L (ref 10–40)
B-OH-BUTYR BLD STRIP-SCNC: 0.1 MMOL/L (ref 0–0.5)
BASOPHILS # BLD AUTO: 0.03 K/UL (ref 0–0.2)
BASOPHILS NFR BLD: 0.6 % (ref 0–1.9)
BILIRUB SERPL-MCNC: 1.8 MG/DL (ref 0.1–1)
BUN SERPL-MCNC: 33 MG/DL (ref 6–20)
CALCIUM SERPL-MCNC: 9.2 MG/DL (ref 8.7–10.5)
CHLORIDE SERPL-SCNC: 97 MMOL/L (ref 95–110)
CO2 SERPL-SCNC: 26 MMOL/L (ref 23–29)
CREAT SERPL-MCNC: 5.5 MG/DL (ref 0.5–1.4)
DELSYS: ABNORMAL
DIFFERENTIAL METHOD: ABNORMAL
EOSINOPHIL # BLD AUTO: 0.2 K/UL (ref 0–0.5)
EOSINOPHIL NFR BLD: 3.3 % (ref 0–8)
ERYTHROCYTE [DISTWIDTH] IN BLOOD BY AUTOMATED COUNT: 16.3 % (ref 11.5–14.5)
EST. GFR  (NO RACE VARIABLE): 9.8 ML/MIN/1.73 M^2
GLUCOSE SERPL-MCNC: 208 MG/DL (ref 70–110)
GLUCOSE SERPL-MCNC: 318 MG/DL (ref 70–110)
GLUCOSE SERPL-MCNC: 485 MG/DL (ref 70–110)
GLUCOSE SERPL-MCNC: 502 MG/DL (ref 70–110)
HCG INTACT+B SERPL-ACNC: 1.6 MIU/ML
HCO3 UR-SCNC: 28.8 MMOL/L (ref 24–28)
HCT VFR BLD AUTO: 27.4 % (ref 37–48.5)
HGB BLD-MCNC: 9.2 G/DL (ref 12–16)
IMM GRANULOCYTES # BLD AUTO: 0.04 K/UL (ref 0–0.04)
IMM GRANULOCYTES NFR BLD AUTO: 0.8 % (ref 0–0.5)
LIPASE SERPL-CCNC: 30 U/L (ref 4–60)
LYMPHOCYTES # BLD AUTO: 0.6 K/UL (ref 1–4.8)
LYMPHOCYTES NFR BLD: 12.4 % (ref 18–48)
MAGNESIUM SERPL-MCNC: 2 MG/DL (ref 1.6–2.6)
MCH RBC QN AUTO: 29.2 PG (ref 27–31)
MCHC RBC AUTO-ENTMCNC: 33.6 G/DL (ref 32–36)
MCV RBC AUTO: 87 FL (ref 82–98)
MODE: ABNORMAL
MONOCYTES # BLD AUTO: 0.3 K/UL (ref 0.3–1)
MONOCYTES NFR BLD: 6.5 % (ref 4–15)
NEUTROPHILS # BLD AUTO: 3.9 K/UL (ref 1.8–7.7)
NEUTROPHILS NFR BLD: 76.4 % (ref 38–73)
NRBC BLD-RTO: 0 /100 WBC
PCO2 BLDA: 51.2 MMHG (ref 35–45)
PH SMN: 7.36 [PH] (ref 7.35–7.45)
PHOSPHATE SERPL-MCNC: 4.6 MG/DL (ref 2.7–4.5)
PLATELET # BLD AUTO: 74 K/UL (ref 150–450)
PMV BLD AUTO: 10.6 FL (ref 9.2–12.9)
PO2 BLDA: 36 MMHG (ref 40–60)
POC BE: 3 MMOL/L
POC SATURATED O2: 65 % (ref 95–100)
POC TCO2: 30 MMOL/L (ref 24–29)
POTASSIUM SERPL-SCNC: 4.7 MMOL/L (ref 3.5–5.1)
PROT SERPL-MCNC: 7.5 G/DL (ref 6–8.4)
RBC # BLD AUTO: 3.15 M/UL (ref 4–5.4)
SAMPLE: ABNORMAL
SITE: ABNORMAL
SODIUM SERPL-SCNC: 134 MMOL/L (ref 136–145)
TROPONIN I SERPL HS-MCNC: 24 PG/ML (ref 0–14.9)
WBC # BLD AUTO: 5.1 K/UL (ref 3.9–12.7)

## 2023-06-03 PROCEDURE — 85025 COMPLETE CBC W/AUTO DIFF WBC: CPT | Performed by: EMERGENCY MEDICINE

## 2023-06-03 PROCEDURE — 99900035 HC TECH TIME PER 15 MIN (STAT)

## 2023-06-03 PROCEDURE — 27000221 HC OXYGEN, UP TO 24 HOURS

## 2023-06-03 PROCEDURE — 93005 ELECTROCARDIOGRAM TRACING: CPT | Performed by: INTERNAL MEDICINE

## 2023-06-03 PROCEDURE — 99285 EMERGENCY DEPT VISIT HI MDM: CPT | Mod: 25

## 2023-06-03 PROCEDURE — G0378 HOSPITAL OBSERVATION PER HR: HCPCS

## 2023-06-03 PROCEDURE — 93010 EKG 12-LEAD: ICD-10-PCS | Mod: ,,, | Performed by: INTERNAL MEDICINE

## 2023-06-03 PROCEDURE — 83690 ASSAY OF LIPASE: CPT | Performed by: EMERGENCY MEDICINE

## 2023-06-03 PROCEDURE — 90935 HEMODIALYSIS ONE EVALUATION: CPT

## 2023-06-03 PROCEDURE — 96374 THER/PROPH/DIAG INJ IV PUSH: CPT

## 2023-06-03 PROCEDURE — 83735 ASSAY OF MAGNESIUM: CPT | Performed by: EMERGENCY MEDICINE

## 2023-06-03 PROCEDURE — 96372 THER/PROPH/DIAG INJ SC/IM: CPT | Mod: 59 | Performed by: HOSPITALIST

## 2023-06-03 PROCEDURE — 80053 COMPREHEN METABOLIC PANEL: CPT | Performed by: EMERGENCY MEDICINE

## 2023-06-03 PROCEDURE — 36415 COLL VENOUS BLD VENIPUNCTURE: CPT | Performed by: EMERGENCY MEDICINE

## 2023-06-03 PROCEDURE — 84484 ASSAY OF TROPONIN QUANT: CPT | Performed by: EMERGENCY MEDICINE

## 2023-06-03 PROCEDURE — 84100 ASSAY OF PHOSPHORUS: CPT | Performed by: EMERGENCY MEDICINE

## 2023-06-03 PROCEDURE — 63600175 PHARM REV CODE 636 W HCPCS: Performed by: EMERGENCY MEDICINE

## 2023-06-03 PROCEDURE — 25000003 PHARM REV CODE 250: Performed by: EMERGENCY MEDICINE

## 2023-06-03 PROCEDURE — 93010 ELECTROCARDIOGRAM REPORT: CPT | Mod: ,,, | Performed by: INTERNAL MEDICINE

## 2023-06-03 PROCEDURE — 84702 CHORIONIC GONADOTROPIN TEST: CPT | Performed by: EMERGENCY MEDICINE

## 2023-06-03 PROCEDURE — 25000003 PHARM REV CODE 250: Performed by: HOSPITALIST

## 2023-06-03 PROCEDURE — 82803 BLOOD GASES ANY COMBINATION: CPT

## 2023-06-03 PROCEDURE — 82010 KETONE BODYS QUAN: CPT | Performed by: EMERGENCY MEDICINE

## 2023-06-03 PROCEDURE — 82962 GLUCOSE BLOOD TEST: CPT

## 2023-06-03 PROCEDURE — 96375 TX/PRO/DX INJ NEW DRUG ADDON: CPT

## 2023-06-03 RX ORDER — HYDROCODONE BITARTRATE AND ACETAMINOPHEN 10; 325 MG/1; MG/1
1 TABLET ORAL EVERY 6 HOURS PRN
Status: DISCONTINUED | OUTPATIENT
Start: 2023-06-03 | End: 2023-06-04 | Stop reason: HOSPADM

## 2023-06-03 RX ORDER — DICYCLOMINE HYDROCHLORIDE 10 MG/1
10 CAPSULE ORAL 4 TIMES DAILY PRN
Status: DISCONTINUED | OUTPATIENT
Start: 2023-06-03 | End: 2023-06-04 | Stop reason: HOSPADM

## 2023-06-03 RX ORDER — NALOXONE HCL 0.4 MG/ML
0.02 VIAL (ML) INJECTION
Status: DISCONTINUED | OUTPATIENT
Start: 2023-06-03 | End: 2023-06-04 | Stop reason: HOSPADM

## 2023-06-03 RX ORDER — SODIUM,POTASSIUM PHOSPHATES 280-250MG
2 POWDER IN PACKET (EA) ORAL
Status: DISCONTINUED | OUTPATIENT
Start: 2023-06-03 | End: 2023-06-04 | Stop reason: HOSPADM

## 2023-06-03 RX ORDER — METOCLOPRAMIDE HYDROCHLORIDE 5 MG/ML
10 INJECTION INTRAMUSCULAR; INTRAVENOUS
Status: COMPLETED | OUTPATIENT
Start: 2023-06-03 | End: 2023-06-03

## 2023-06-03 RX ORDER — CARVEDILOL 25 MG/1
25 TABLET ORAL 2 TIMES DAILY
Status: DISCONTINUED | OUTPATIENT
Start: 2023-06-03 | End: 2023-06-04 | Stop reason: HOSPADM

## 2023-06-03 RX ORDER — POLYETHYLENE GLYCOL 3350 17 G/17G
17 POWDER, FOR SOLUTION ORAL 2 TIMES DAILY
Status: DISCONTINUED | OUTPATIENT
Start: 2023-06-03 | End: 2023-06-03

## 2023-06-03 RX ORDER — MUPIROCIN 20 MG/G
OINTMENT TOPICAL 2 TIMES DAILY
Status: DISCONTINUED | OUTPATIENT
Start: 2023-06-03 | End: 2023-06-04 | Stop reason: HOSPADM

## 2023-06-03 RX ORDER — HYDRALAZINE HYDROCHLORIDE 25 MG/1
100 TABLET, FILM COATED ORAL EVERY 8 HOURS
Status: DISCONTINUED | OUTPATIENT
Start: 2023-06-03 | End: 2023-06-04 | Stop reason: HOSPADM

## 2023-06-03 RX ORDER — PANTOPRAZOLE SODIUM 40 MG/1
40 TABLET, DELAYED RELEASE ORAL DAILY
Status: DISCONTINUED | OUTPATIENT
Start: 2023-06-03 | End: 2023-06-04 | Stop reason: HOSPADM

## 2023-06-03 RX ORDER — ENOXAPARIN SODIUM 100 MG/ML
40 INJECTION SUBCUTANEOUS EVERY 24 HOURS
Status: DISCONTINUED | OUTPATIENT
Start: 2023-06-03 | End: 2023-06-03

## 2023-06-03 RX ORDER — FLUOXETINE HYDROCHLORIDE 20 MG/1
20 CAPSULE ORAL DAILY
Status: DISCONTINUED | OUTPATIENT
Start: 2023-06-03 | End: 2023-06-04 | Stop reason: HOSPADM

## 2023-06-03 RX ORDER — ACETAMINOPHEN 325 MG/1
650 TABLET ORAL EVERY 8 HOURS PRN
Status: DISCONTINUED | OUTPATIENT
Start: 2023-06-03 | End: 2023-06-04 | Stop reason: HOSPADM

## 2023-06-03 RX ORDER — LEVETIRACETAM 500 MG/1
500 TABLET ORAL 2 TIMES DAILY
Status: DISCONTINUED | OUTPATIENT
Start: 2023-06-03 | End: 2023-06-04 | Stop reason: HOSPADM

## 2023-06-03 RX ORDER — IBUPROFEN 200 MG
24 TABLET ORAL
Status: DISCONTINUED | OUTPATIENT
Start: 2023-06-03 | End: 2023-06-04 | Stop reason: HOSPADM

## 2023-06-03 RX ORDER — AMLODIPINE BESYLATE 5 MG/1
10 TABLET ORAL DAILY
Status: DISCONTINUED | OUTPATIENT
Start: 2023-06-03 | End: 2023-06-04 | Stop reason: HOSPADM

## 2023-06-03 RX ORDER — ACETAMINOPHEN 325 MG/1
650 TABLET ORAL EVERY 4 HOURS PRN
Status: DISCONTINUED | OUTPATIENT
Start: 2023-06-03 | End: 2023-06-04 | Stop reason: HOSPADM

## 2023-06-03 RX ORDER — IBUPROFEN 200 MG
16 TABLET ORAL
Status: DISCONTINUED | OUTPATIENT
Start: 2023-06-03 | End: 2023-06-04 | Stop reason: HOSPADM

## 2023-06-03 RX ORDER — ONDANSETRON 2 MG/ML
4 INJECTION INTRAMUSCULAR; INTRAVENOUS EVERY 8 HOURS PRN
Status: DISCONTINUED | OUTPATIENT
Start: 2023-06-03 | End: 2023-06-04 | Stop reason: HOSPADM

## 2023-06-03 RX ORDER — FAMOTIDINE 10 MG/ML
20 INJECTION INTRAVENOUS
Status: COMPLETED | OUTPATIENT
Start: 2023-06-03 | End: 2023-06-03

## 2023-06-03 RX ORDER — HYDROMORPHONE HYDROCHLORIDE 1 MG/ML
0.5 INJECTION, SOLUTION INTRAMUSCULAR; INTRAVENOUS; SUBCUTANEOUS
Status: COMPLETED | OUTPATIENT
Start: 2023-06-03 | End: 2023-06-03

## 2023-06-03 RX ORDER — POLYETHYLENE GLYCOL 3350 17 G/17G
17 POWDER, FOR SOLUTION ORAL 3 TIMES DAILY
Status: DISCONTINUED | OUTPATIENT
Start: 2023-06-03 | End: 2023-06-04 | Stop reason: HOSPADM

## 2023-06-03 RX ORDER — SODIUM CHLORIDE 9 MG/ML
INJECTION, SOLUTION INTRAVENOUS ONCE
Status: DISCONTINUED | OUTPATIENT
Start: 2023-06-03 | End: 2023-06-03

## 2023-06-03 RX ORDER — SIMETHICONE 80 MG
1 TABLET,CHEWABLE ORAL 4 TIMES DAILY PRN
Status: DISCONTINUED | OUTPATIENT
Start: 2023-06-03 | End: 2023-06-04 | Stop reason: HOSPADM

## 2023-06-03 RX ORDER — LANOLIN ALCOHOL/MO/W.PET/CERES
800 CREAM (GRAM) TOPICAL
Status: DISCONTINUED | OUTPATIENT
Start: 2023-06-03 | End: 2023-06-04 | Stop reason: HOSPADM

## 2023-06-03 RX ORDER — GLUCAGON 1 MG
1 KIT INJECTION
Status: DISCONTINUED | OUTPATIENT
Start: 2023-06-03 | End: 2023-06-04 | Stop reason: HOSPADM

## 2023-06-03 RX ORDER — SUCRALFATE 1 G/10ML
1 SUSPENSION ORAL
Status: DISCONTINUED | OUTPATIENT
Start: 2023-06-03 | End: 2023-06-04 | Stop reason: HOSPADM

## 2023-06-03 RX ORDER — HYDROCODONE BITARTRATE AND ACETAMINOPHEN 5; 325 MG/1; MG/1
1 TABLET ORAL EVERY 6 HOURS PRN
Status: DISCONTINUED | OUTPATIENT
Start: 2023-06-03 | End: 2023-06-04 | Stop reason: HOSPADM

## 2023-06-03 RX ORDER — GABAPENTIN 100 MG/1
100 CAPSULE ORAL 2 TIMES DAILY
Status: DISCONTINUED | OUTPATIENT
Start: 2023-06-03 | End: 2023-06-04 | Stop reason: HOSPADM

## 2023-06-03 RX ORDER — LORAZEPAM 2 MG/ML
0.5 INJECTION INTRAMUSCULAR
Status: COMPLETED | OUTPATIENT
Start: 2023-06-03 | End: 2023-06-03

## 2023-06-03 RX ORDER — INSULIN ASPART 100 [IU]/ML
1-10 INJECTION, SOLUTION INTRAVENOUS; SUBCUTANEOUS
Status: DISCONTINUED | OUTPATIENT
Start: 2023-06-03 | End: 2023-06-04 | Stop reason: HOSPADM

## 2023-06-03 RX ORDER — SODIUM CHLORIDE 0.9 % (FLUSH) 0.9 %
10 SYRINGE (ML) INJECTION EVERY 12 HOURS PRN
Status: DISCONTINUED | OUTPATIENT
Start: 2023-06-03 | End: 2023-06-04 | Stop reason: HOSPADM

## 2023-06-03 RX ORDER — TALC
6 POWDER (GRAM) TOPICAL NIGHTLY PRN
Status: DISCONTINUED | OUTPATIENT
Start: 2023-06-03 | End: 2023-06-04 | Stop reason: HOSPADM

## 2023-06-03 RX ADMIN — AMLODIPINE BESYLATE 10 MG: 5 TABLET ORAL at 07:06

## 2023-06-03 RX ADMIN — MUPIROCIN 1 G: 20 OINTMENT TOPICAL at 08:06

## 2023-06-03 RX ADMIN — INSULIN DETEMIR 10 UNITS: 100 INJECTION, SOLUTION SUBCUTANEOUS at 08:06

## 2023-06-03 RX ADMIN — INSULIN HUMAN 5 UNITS: 100 INJECTION, SOLUTION PARENTERAL at 12:06

## 2023-06-03 RX ADMIN — GABAPENTIN 100 MG: 100 CAPSULE ORAL at 08:06

## 2023-06-03 RX ADMIN — SUCRALFATE 1 G: 1 SUSPENSION ORAL at 07:06

## 2023-06-03 RX ADMIN — HYDROCODONE BITARTRATE AND ACETAMINOPHEN 1 TABLET: 10; 325 TABLET ORAL at 07:06

## 2023-06-03 RX ADMIN — FAMOTIDINE 20 MG: 10 INJECTION, SOLUTION INTRAVENOUS at 12:06

## 2023-06-03 RX ADMIN — HYDROMORPHONE HYDROCHLORIDE 0.5 MG: 0.5 INJECTION, SOLUTION INTRAMUSCULAR; INTRAVENOUS; SUBCUTANEOUS at 12:06

## 2023-06-03 RX ADMIN — METOCLOPRAMIDE 10 MG: 5 INJECTION, SOLUTION INTRAMUSCULAR; INTRAVENOUS at 12:06

## 2023-06-03 RX ADMIN — LEVETIRACETAM 500 MG: 500 TABLET, FILM COATED ORAL at 08:06

## 2023-06-03 RX ADMIN — LORAZEPAM 0.5 MG: 2 INJECTION INTRAMUSCULAR; INTRAVENOUS at 12:06

## 2023-06-03 RX ADMIN — CARVEDILOL 25 MG: 25 TABLET, FILM COATED ORAL at 08:06

## 2023-06-03 RX ADMIN — FLUOXETINE 20 MG: 20 CAPSULE ORAL at 07:06

## 2023-06-03 RX ADMIN — HYDRALAZINE HYDROCHLORIDE 100 MG: 25 TABLET ORAL at 09:06

## 2023-06-03 RX ADMIN — APIXABAN 5 MG: 5 TABLET, FILM COATED ORAL at 08:06

## 2023-06-03 RX ADMIN — ISOSORBIDE MONONITRATE 90 MG: 60 TABLET, EXTENDED RELEASE ORAL at 07:06

## 2023-06-03 NOTE — PROGRESS NOTES
06/03/23 1745   Handoff Report   Received From Stephanie Mena   Vital Signs   Temp 98.1 °F (36.7 °C)   Temp Source Oral   Pulse 63   Resp 18   SpO2 99 %   BP (!) 142/100   BP Location Right arm   BP Method Automatic   Patient Position Lying   Assessments (Pre/Post)   Blood Liters Processed (BLP) 68.5   Transport Modality stretcher   Level of Consciousness (AVPU) alert   Dialyzer Clearance moderately streaked        Hemodialysis Catheter 12/09/22 right subclavian   Placement Date: 12/09/22   Location: right subclavian   Line Necessity Review CRRT/HD   Site Assessment No drainage;No redness;No swelling;No warmth   Line Securement Device Secured with sutures   Dressing Type CHG impregnated dressing/sponge   Dressing Status Clean;Dry;Intact   Dressing Intervention Sterile dressing change   Date on Dressing 06/03/23   Dressing Due to be Changed 06/10/23   Venous Patency/Care flushed w/o difficulty;normal saline locked   Arterial Patency/Care flushed w/o difficulty;normal saline locked   Waveform Not being transduced   Post-Hemodialysis Assessment   Rinseback Volume (mL) 250 mL   Blood Volume Processed (Liters) 68.5 L   Dialyzer Clearance Moderately streaked   Duration of Treatment 180 minutes   Additional Fluid Intake (mL) 500 mL   Total UF (mL) 3500 mL   Net Fluid Removal 3000   Patient Response to Treatment Tolerated well   Post-Treatment Weight 48.7 kg (107 lb 5.8 oz)   Treatment Weight Change -3   Post-Hemodialysis Comments Tx complete, all blood reinfused per protocol

## 2023-06-03 NOTE — ED NOTES
States she went to dialysis on Thursday. Days are tue, thu, sat. C/O abdominal pain. States she had not had any pain medicine since yesterday

## 2023-06-03 NOTE — CARE UPDATE
06/03/23 1240   Patient Assessment/Suction   Level of Consciousness (AVPU) alert   Respiratory Effort Unlabored   Expansion/Accessory Muscles/Retractions no use of accessory muscles   PRE-TX-O2   Device (Oxygen Therapy) nasal cannula  (room air sats 86%)   $ Is the patient on Low Flow Oxygen? Yes   Flow (L/min) 2   SpO2 (!) 93 %   Pulse 91   Labs   $ Was an ABG obtained? Venous Line;ISTAT - Blood gas   $ Labs Tech Time 15 min   Critical Value Communication   Doctor not notified of critical value due to: known abnormal - expected abnormal

## 2023-06-03 NOTE — PHARMACY MED REC
"    Admission Medication History     The home medication history was taken by Carolann Nieto.    You may go to "Admission" then "Reconcile Home Medications" tabs to review and/or act upon these items.     The home medication list has been updated by the Pharmacy department.   Please read ALL comments highlighted in yellow.   Please address this information as you see fit.    Feel free to contact us if you have any questions or require assistance.        Medications listed below were obtained from: [employer]  No current facility-administered medications on file prior to encounter.     Current Outpatient Medications on File Prior to Encounter   Medication Sig Dispense Refill    amLODIPine (NORVASC) 5 MG tablet Take 2 tablets (10 mg total) by mouth once daily. 30 tablet 0    apixaban (ELIQUIS) 5 mg Tab Take 1 tablet (5 mg total) by mouth 2 (two) times daily. 60 tablet 2    carvediloL (COREG) 25 MG tablet Take 1 tablet (25 mg total) by mouth 2 (two) times daily. 60 tablet 5    dicyclomine (BENTYL) 10 MG capsule Take 1 capsule (10 mg total) by mouth 4 (four) times daily as needed (abdominal pain). 20 capsule 0    FLUoxetine 20 MG capsule Take 1 capsule (20 mg total) by mouth once daily. 30 capsule 5    gabapentin (NEURONTIN) 100 MG capsule Take 1 capsule by mouth 3 times daily (Patient taking differently: Take 100 mg by mouth 3 (three) times daily.) 90 capsule 2    hydrALAZINE (APRESOLINE) 100 MG tablet Take 1 tablet (100 mg total) by mouth every 8 (eight) hours. 60 tablet 0    HYDROcodone-acetaminophen (NORCO) 7.5-325 mg per tablet Take 1 tablet by mouth every 6 (six) hours as needed for Pain. 30 tablet 0    insulin aspart U-100 (NOVOLOG) 100 unit/mL (3 mL) InPn pen Inject 4 Units into the skin 3 (three) times daily with meals. 15 mL 3    insulin detemir U-100, Levemir, 100 unit/mL (3 mL) SubQ InPn pen Inject 6 Units into the skin 2 (two) times daily. 15 mL 3    isosorbide mononitrate (IMDUR) 30 MG 24 hr " "tablet Take 3 tablets (90 mg total) by mouth once daily. 90 tablet 11    levETIRAcetam (KEPPRA) 500 MG Tab Take 1 tablet twice a day by oral route for 30 days. (Patient taking differently: Take 500 mg by mouth 2 (two) times daily.) 60 tablet 2    pantoprazole (PROTONIX) 40 MG tablet Take 1 tablet every day by oral route for 30 days. (Patient taking differently: Take 40 mg by mouth once daily.) 30 tablet 2    pen needle, diabetic 31 gauge x 3/16" Ndle Use as directed to inject insulin 5 times daily 100 each 11    sucralfate (CARAFATE) 100 mg/mL suspension Take 10 mLs (1 g total) by mouth 4 (four) times daily before meals and nightly. 1200 mL 0    [DISCONTINUED] atenoloL (TENORMIN) 50 MG tablet Take 1 tablet (50 mg total) by mouth every other day. 45 tablet 3    [DISCONTINUED] omeprazole (PRILOSEC) 20 MG capsule Take 2 capsules (40 mg total) by mouth once daily. for 10 days 20 capsule 0       Potential issues to be addressed PRIOR TO DISCHARGE  Please discuss with the patient barriers to adherence with medication treatment plans  Patient requires education regarding drug therapies     Carolann Nieto  [id number]              .        "

## 2023-06-03 NOTE — NURSING
Nurses Note -- 4 Eyes      6/3/2023   6:16 PM      Skin assessed during: Admit      [x] No Altered Skin Integrity Present    [x]Prevention Measures Documented      [] Yes- Altered Skin Integrity Present or Discovered   [] LDA Added if Not in Epic (Describe Wound)   [] New Altered Skin Integrity was Present on Admit and Documented in LDA   [] Wound Image Taken    Wound Care Consulted? No    Attending Nurse:  Stephanie Gilbert RN     Second RN/Staff Member:  Ada Badillo LPN

## 2023-06-03 NOTE — ED PROVIDER NOTES
Encounter Date: 6/3/2023       History     Chief Complaint   Patient presents with    Abdominal Pain     Hyperglycemia, last Insulin was yesterday morning, last dialysis was thursday    Vomiting     Patient with history of diabetes end-stage renal disease.  Patient is not taken her insulin since last night.  Patient reports upper abdominal pain.  Positive nausea and vomiting.  Multiple similar presentations in the past.  Patient did miss her dialysis today.    Review of patient's allergies indicates:   Allergen Reactions    Penicillins Hives     Past Medical History:   Diagnosis Date    ESRD on hemodialysis 2022    Gastritis 2022    EGD was 22    Gastroparesis 2022    has not had Emptying study    Heart failure with preserved ejection fraction 2022    EF 55% on 3/22    History of supraventricular tachycardia     Hyperkalemia 2022    Hypertensive emergency 2022    Sickle cell trait 2022    Type 2 diabetes mellitus      Past Surgical History:   Procedure Laterality Date     SECTION      x 3    COLONOSCOPY      COLONOSCOPY N/A 2022    Procedure: COLONOSCOPY;  Surgeon: Jagdeep Cedeno MD;  Location: [facility];  Service: Endoscopy;  Laterality: N/A;    ESOPHAGOGASTRODUODENOSCOPY N/A 10/18/2019    Procedure: ESOPHAGOGASTRODUODENOSCOPY (EGD);  Surgeon: Gianluca Mendez MD;  Location: [facility];  Service: Endoscopy;  Laterality: N/A;    ESOPHAGOGASTRODUODENOSCOPY N/A 2022    Procedure: EGD (ESOPHAGOGASTRODUODENOSCOPY);  Surgeon: Micky Paredes III, MD;  Location: [facility];  Service: Endoscopy;  Laterality: N/A;    ESOPHAGOGASTRODUODENOSCOPY N/A 2022    Procedure: EGD (ESOPHAGOGASTRODUODENOSCOPY);  Surgeon: Marcelo Zhong MD;  Location: [facility];  Service: Endoscopy;  Laterality: N/A;    LAPAROSCOPIC CHOLECYSTECTOMY N/A 2022    Procedure: CHOLECYSTECTOMY, LAPAROSCOPIC;  Surgeon: Grey Perez MD;  Location: [facility];  Service: General;   Laterality: N/A;    PLACEMENT OF DUAL-LUMEN VASCULAR CATHETER Left 07/12/2022    Procedure: INSERTION-CATHETER-JOSEPH;  Surgeon: Dionte Gan MD;  Location: [facility] OR;  Service: General;  Laterality: Left;    PLACEMENT OF DUAL-LUMEN VASCULAR CATHETER Right 07/26/2022    Procedure: INSERTION-CATHETER-Hemosplit;  Surgeon: Dionte Gan MD;  Location: [facility] OR;  Service: General;  Laterality: Right;     Family History   Problem Relation Age of Onset    Diabetes Mother     Diabetes Father      Social History     Tobacco Use    Smoking status: Never    Smokeless tobacco: Never   Substance Use Topics    Alcohol use: Not Currently    Drug use: No     Review of Systems   Constitutional:  Negative for chills and fever.   HENT:  Negative for congestion.    Eyes:  Negative for visual disturbance.   Respiratory:  Positive for shortness of breath.    Cardiovascular:  Negative for chest pain and palpitations.   Gastrointestinal:  Positive for abdominal pain, nausea and vomiting.   Genitourinary:  Negative for dysuria.   Musculoskeletal:  Negative for joint swelling.   Neurological:  Negative for headaches.   Psychiatric/Behavioral:  Negative for confusion.      Physical Exam     Initial Vitals [06/03/23 1135]   BP Pulse Resp Temp SpO2   (!) 161/106 93 (!) 22 97.7 °F (36.5 °C) 95 %      MAP       --         Physical Exam    Nursing note and vitals reviewed.  Constitutional: She is not diaphoretic. No distress.   HENT:   Head: Normocephalic and atraumatic.   Eyes: Conjunctivae are normal.   Neck:   Normal range of motion.  Cardiovascular:  Normal rate.           Pulmonary/Chest: Breath sounds normal.   Abdominal: Abdomen is soft. Bowel sounds are normal.   Moderate epigastric tenderness guarding rebound   Musculoskeletal:         General: Normal range of motion.      Cervical back: Normal range of motion.     Neurological: She is alert.   No gross deficits   Skin: No rash noted.   Psychiatric:   Alert, anxious       ED Course    Procedures  Labs Reviewed   CBC W/ AUTO DIFFERENTIAL - Abnormal; Notable for the following components:       Result Value    RBC 3.15 (*)     Hemoglobin 9.2 (*)     Hematocrit 27.4 (*)     RDW 16.3 (*)     Platelets 74 (*)     Immature Granulocytes 0.8 (*)     Lymph # 0.6 (*)     Gran % 76.4 (*)     Lymph % 12.4 (*)     All other components within normal limits    Narrative:     For upper or mid abdominal pain.   COMPREHENSIVE METABOLIC PANEL - Abnormal; Notable for the following components:    Sodium 134 (*)     Glucose 502 (*)     BUN 33 (*)     Creatinine 5.5 (*)     Total Bilirubin 1.8 (*)     Alkaline Phosphatase 191 (*)     eGFR 9.8 (*)     All other components within normal limits    Narrative:     For upper or mid abdominal pain.   Glucose critical result(s) repeated. Called and verbal readback   obtained from [facility], RN. by SLT1 06/03/2023 12:43   TROPONIN I HIGH SENSITIVITY - Abnormal; Notable for the following components:    Troponin I High Sensitivity 24.0 (*)     All other components within normal limits   PHOSPHORUS - Abnormal; Notable for the following components:    Phosphorus 4.6 (*)     All other components within normal limits   POCT GLUCOSE - Abnormal; Notable for the following components:    POC Glucose 485 (*)     All other components within normal limits   ISTAT PROCEDURE - Abnormal; Notable for the following components:    POC PCO2 51.2 (*)     POC PO2 36 (*)     POC HCO3 28.8 (*)     POC SATURATED O2 65 (*)     POC TCO2 30 (*)     All other components within normal limits   POCT GLUCOSE - Abnormal; Notable for the following components:    POC Glucose 318 (*)     All other components within normal limits   LIPASE    Narrative:     For upper or mid abdominal pain.   MAGNESIUM   PHOSPHORUS   BETA - HYDROXYBUTYRATE, SERUM   TROPONIN I HIGH SENSITIVITY   MAGNESIUM   HCG, QUANTITATIVE   HCG, QUANTITATIVE    Narrative:     For upper or mid abdominal pain.   POCT GLUCOSE, HAND-HELD  DEVICE        ECG Results              EKG 12-lead (In process)  Result time 06/03/23 14:36:12      In process by Interface, Lab In [facility] (06/03/23 14:36:12)                   Narrative:    Test Reason : R10.9,    Vent. Rate : 085 BPM     Atrial Rate : 085 BPM     P-R Int : 196 ms          QRS Dur : 088 ms      QT Int : 414 ms       P-R-T Axes : 037 -39 078 degrees     QTc Int : 492 ms    Normal sinus rhythm  Left axis deviation  Nonspecific ST and T wave abnormality  Prolonged QT  Abnormal ECG  No previous ECGs available    Referred By: [email or web address]   SELF           Confirmed By:                                   Imaging Results              X-Ray Abdomen Flat And Erect (Final result)  Result time 06/03/23 13:26:43      Final result by Deandre Shirley MD (06/03/23 13:26:43)                   Narrative:    CLINICAL HISTORY:  33 years (1990) Female Abd pain    TECHNIQUE:  XR ABDOMEN 2 VIEWS SUPINE ERECT.  3 image(s) obtained.    COMPARISON:  Radiograph from March 13, 2023.    FINDINGS:  There are no abnormal gas filled loops of large or small bowel to suggest bowel obstruction. There is no pneumatosis or gross pneumoperitoneum. Osseous structures show no acute abnormality.    IMPRESSION:  Small volume of stool and gas in the bowel with a nonobstructive bowel gas pattern.                  .    Electronically signed by:  Deandre Shirley MD  6/3/2023 1:26 PM CDT Workstation: [id number]                                     X-Ray Chest AP Portable (Final result)  Result time 06/03/23 13:13:56      Final result by Deandre Shirley MD (06/03/23 13:13:56)                   Narrative:    CLINICAL HISTORY:  33 years (1990) Female Abdominal pain Abdominal pain    TECHNIQUE:  Portable AP radiograph the chest.    COMPARISON:  Radiograph from May 16, 2023.    FINDINGS:  The lungs are clear, except for some vascular crowding bilaterally, likely related to a suboptimal inspiration.   Jagdeep cardiopericardial silhouette  enlargement. There is a right-sided IJ dual-lumen central venous catheter with tip at the level of the cavoatrial junction. Osseous structures appear unchanged. The visualized upper abdomen is unremarkable.    IMPRESSION:  1. Marked cardiopericardial silhouette enlargement.  2. Low lung lungs with mild perihilar pulmonary vascular prominence.                  .            Electronically signed by:  Deandre Shirley MD  6/3/2023 1:13 PM CDT Workstation: [id number]                                     Medications   polyethylene glycol packet 17 g (17 g Oral Not Given 6/3/23 2100)   mupirocin 2 % ointment (1 g Nasal Given 6/3/23 2023)   sodium chloride 0.9% bolus 250 mL 250 mL (has no administration in time range)   amLODIPine tablet 10 mg (10 mg Oral Given 6/3/23 1924)   apixaban tablet 5 mg (5 mg Oral Given 6/3/23 2023)   carvediloL tablet 25 mg (25 mg Oral Given 6/3/23 2023)   dicyclomine capsule 10 mg (has no administration in time range)   FLUoxetine capsule 20 mg (20 mg Oral Given 6/3/23 1924)   gabapentin capsule 100 mg (100 mg Oral Given 6/3/23 2023)   hydrALAZINE tablet 100 mg (has no administration in time range)   isosorbide mononitrate 24 hr tablet 90 mg (90 mg Oral Given 6/3/23 1923)   levETIRAcetam tablet 500 mg (500 mg Oral Given 6/3/23 2023)   pantoprazole EC tablet 40 mg (has no administration in time range)   sucralfate 100 mg/mL suspension 1 g (1 g Oral Not Given 6/3/23 2100)   sodium chloride 0.9% flush 10 mL (has no administration in time range)   melatonin tablet 6 mg (has no administration in time range)   ondansetron injection 4 mg (has no administration in time range)   acetaminophen tablet 650 mg (has no administration in time range)   simethicone chewable tablet 80 mg (has no administration in time range)   acetaminophen tablet 650 mg (has no administration in time range)   HYDROcodone-acetaminophen 5-325 mg per tablet 1 tablet (has no administration in time range)    HYDROcodone-acetaminophen  mg per tablet 1 tablet (1 tablet Oral Given 6/3/23 1913)   naloxone 0.4 mg/mL injection 0.02 mg (has no administration in time range)   potassium bicarbonate disintegrating tablet 50 mEq (has no administration in time range)   potassium bicarbonate disintegrating tablet 35 mEq (has no administration in time range)   potassium bicarbonate disintegrating tablet 60 mEq (has no administration in time range)   magnesium oxide tablet 800 mg (has no administration in time range)   magnesium oxide tablet 800 mg (has no administration in time range)   potassium, sodium phosphates 280-160-250 mg packet 2 packet (has no administration in time range)   potassium, sodium phosphates 280-160-250 mg packet 2 packet (has no administration in time range)   potassium, sodium phosphates 280-160-250 mg packet 2 packet (has no administration in time range)   glucose chewable tablet 16 g (has no administration in time range)   glucose chewable tablet 24 g (has no administration in time range)   dextrose 50% injection 12.5 g (has no administration in time range)   dextrose 50% injection 25 g (has no administration in time range)   glucagon (human recombinant) injection 1 mg (has no administration in time range)   insulin aspart U-100 pen 1-10 Units (has no administration in time range)   insulin detemir U-100 (Levemir) pen 10 Units (10 Units Subcutaneous Given 6/3/23 2024)   insulin regular injection 5 Units 0.05 mL (5 Units Intravenous Given 6/3/23 1236)   metoclopramide HCl injection 10 mg (10 mg Intravenous Given 6/3/23 1245)   famotidine (PF) injection 20 mg (20 mg Intravenous Given 6/3/23 1245)   LORazepam injection 0.5 mg (0.5 mg Intravenous Given 6/3/23 1245)   HYDROmorphone injection 0.5 mg (0.5 mg Intravenous Given 6/3/23 1246)     Medical Decision Making:   History:   Old Medical Records: I decided to obtain old medical records.  Old Records Summarized: records from clinic visits and records  from previous admission(s).       <> Summary of Records: Similar presentation past  Differential Diagnosis:   Gastritis, gastroparesis, pulmonary edema, mesenteric edema  Independently Interpreted Test(s):   I have ordered and independently interpreted X-rays - see summary below.       <> Summary of X-Ray Reading(s): No acute process  I have ordered and independently interpreted EKG Reading(s) - see summary below       <> Summary of EKG Reading(s): Normal sinus rhythm  Clinical Tests:   Lab Tests: Reviewed  Radiological Study: Reviewed  Medical Tests: Reviewed  ED Management:  Patient presents with upper abdominal pain.  Multiple CTs obtained in the past.  Patient does seem to get some mesenteric edema causing her abdominal pain.  Discussed with her nephrologist, Dr. Roy.  He desires admission to arrange dialysis.  Hospitalist consulted for admission.  Patient seems better after treatment here.                        Clinical Impression:   Final diagnoses:  [R10.9] Abdominal pain  [N18.6] ESRD (end stage renal disease)  [E87.70] Hypervolemia, unspecified hypervolemia type (Primary)        ED Disposition Condition    Observation                 Micky Souza MD  06/03/23 1283

## 2023-06-03 NOTE — SUBJECTIVE & OBJECTIVE
Past Medical History:   Diagnosis Date    ESRD on hemodialysis 2022    Gastritis 2022    EGD was 22    Gastroparesis 2022    has not had Emptying study    Heart failure with preserved ejection fraction 2022    EF 55% on 3/22    History of supraventricular tachycardia     Hyperkalemia 2022    Hypertensive emergency 2022    Sickle cell trait 2022    Type 2 diabetes mellitus        Past Surgical History:   Procedure Laterality Date     SECTION      x 3    COLONOSCOPY      COLONOSCOPY N/A 2022    Procedure: COLONOSCOPY;  Surgeon: Jagdeep Cedeno MD;  Location: [facility];  Service: Endoscopy;  Laterality: N/A;    ESOPHAGOGASTRODUODENOSCOPY N/A 10/18/2019    Procedure: ESOPHAGOGASTRODUODENOSCOPY (EGD);  Surgeon: Gianluca Mendez MD;  Location: [facility];  Service: Endoscopy;  Laterality: N/A;    ESOPHAGOGASTRODUODENOSCOPY N/A 2022    Procedure: EGD (ESOPHAGOGASTRODUODENOSCOPY);  Surgeon: Micky Paredes III, MD;  Location: [facility];  Service: Endoscopy;  Laterality: N/A;    ESOPHAGOGASTRODUODENOSCOPY N/A 2022    Procedure: EGD (ESOPHAGOGASTRODUODENOSCOPY);  Surgeon: Marcelo Zhong MD;  Location: [facility];  Service: Endoscopy;  Laterality: N/A;    LAPAROSCOPIC CHOLECYSTECTOMY N/A 2022    Procedure: CHOLECYSTECTOMY, LAPAROSCOPIC;  Surgeon: Grey Perez MD;  Location: [facility];  Service: General;  Laterality: N/A;    PLACEMENT OF DUAL-LUMEN VASCULAR CATHETER Left 2022    Procedure: INSERTION-CATHETER-JOSEPH;  Surgeon: Dionte Gan MD;  Location: [facility] OR;  Service: General;  Laterality: Left;    PLACEMENT OF DUAL-LUMEN VASCULAR CATHETER Right 2022    Procedure: INSERTION-CATHETER-Hemosplit;  Surgeon: Dionte Gan MD;  Location: [facility] OR;  Service: General;  Laterality: Right;       Review of patient's allergies indicates:   Allergen Reactions    Penicillins Hives       No current facility-administered medications on file  "prior to encounter.     Current Outpatient Medications on File Prior to Encounter   Medication Sig    amLODIPine (NORVASC) 5 MG tablet Take 2 tablets (10 mg total) by mouth once daily.    apixaban (ELIQUIS) 5 mg Tab Take 1 tablet (5 mg total) by mouth 2 (two) times daily.    carvediloL (COREG) 25 MG tablet Take 1 tablet (25 mg total) by mouth 2 (two) times daily.    dicyclomine (BENTYL) 10 MG capsule Take 1 capsule (10 mg total) by mouth 4 (four) times daily as needed (abdominal pain).    FLUoxetine 20 MG capsule Take 1 capsule (20 mg total) by mouth once daily.    gabapentin (NEURONTIN) 100 MG capsule Take 1 capsule by mouth 3 times daily (Patient taking differently: Take 100 mg by mouth 3 (three) times daily.)    hydrALAZINE (APRESOLINE) 100 MG tablet Take 1 tablet (100 mg total) by mouth every 8 (eight) hours.    HYDROcodone-acetaminophen (NORCO) 7.5-325 mg per tablet Take 1 tablet by mouth every 6 (six) hours as needed for Pain.    insulin aspart U-100 (NOVOLOG) 100 unit/mL (3 mL) InPn pen Inject 4 Units into the skin 3 (three) times daily with meals.    insulin detemir U-100, Levemir, 100 unit/mL (3 mL) SubQ InPn pen Inject 6 Units into the skin 2 (two) times daily.    isosorbide mononitrate (IMDUR) 30 MG 24 hr tablet Take 3 tablets (90 mg total) by mouth once daily.    levETIRAcetam (KEPPRA) 500 MG Tab Take 1 tablet twice a day by oral route for 30 days. (Patient taking differently: Take 500 mg by mouth 2 (two) times daily.)    pantoprazole (PROTONIX) 40 MG tablet Take 1 tablet every day by oral route for 30 days. (Patient taking differently: Take 40 mg by mouth once daily.)    pen needle, diabetic 31 gauge x 3/16" Ndle Use as directed to inject insulin 5 times daily    sucralfate (CARAFATE) 100 mg/mL suspension Take 10 mLs (1 g total) by mouth 4 (four) times daily before meals and nightly.    [DISCONTINUED] atenoloL (TENORMIN) 50 MG tablet Take 1 tablet (50 mg total) by mouth every other day.    " [DISCONTINUED] omeprazole (PRILOSEC) 20 MG capsule Take 2 capsules (40 mg total) by mouth once daily. for 10 days     Family History       Problem Relation (Age of Onset)    Diabetes Mother, Father          Tobacco Use    Smoking status: Never    Smokeless tobacco: Never   Substance and Sexual Activity    Alcohol use: Not Currently    Drug use: No    Sexual activity: Not Currently     Partners: Male     Birth control/protection: I.U.D.     Review of Systems   Constitutional:  Positive for fatigue. Negative for chills and fever.   HENT:  Negative for sore throat.    Eyes:  Negative for redness and itching.   Respiratory:  Negative for chest tightness and shortness of breath.    Cardiovascular:  Negative for chest pain and palpitations.   Gastrointestinal:  Positive for abdominal pain and nausea. Negative for blood in stool.   Genitourinary:  Negative for dysuria.   Musculoskeletal:  Negative for gait problem and joint swelling.   Neurological:  Negative for tremors and weakness.   Psychiatric/Behavioral:  Negative for behavioral problems and sleep disturbance.    All other systems reviewed and are negative.  Objective:     Vital Signs (Most Recent):  Temp: 97.7 °F (36.5 °C) (06/03/23 1135)  Pulse: 88 (06/03/23 1344)  Resp: (!) 24 (06/03/23 1246)  BP: (!) 144/76 (06/03/23 1330)  SpO2: 98 % (06/03/23 1344) Vital Signs (24h Range):  Temp:  [97.7 °F (36.5 °C)] 97.7 °F (36.5 °C)  Pulse:  [85-93] 88  Resp:  [22-24] 24  SpO2:  [93 %-98 %] 98 %  BP: (140-161)/() 144/76     Weight: 51.7 kg (114 lb)  Body mass index is 20.85 kg/m².     Physical Exam  Vitals and nursing note reviewed.   Constitutional:       Appearance: She is well-developed.      Comments: Chronically ill-appearing, in no acute distress, does not like to be disturbed   HENT:      Head: Atraumatic.   Eyes:      Comments: Poor baseline vision   Neck:      Vascular: No JVD.   Cardiovascular:      Rate and Rhythm: Normal rate and regular rhythm.      Heart  sounds: Normal heart sounds. No murmur heard.    No gallop.   Pulmonary:      Effort: No respiratory distress.      Breath sounds: Normal breath sounds. No wheezing.   Abdominal:      General: Bowel sounds are normal. There is no distension.      Palpations: Abdomen is soft.      Tenderness: There is no guarding or rebound.   Musculoskeletal:      Cervical back: Neck supple.   Lymphadenopathy:      Cervical: No cervical adenopathy.   Skin:     General: Skin is warm.      Capillary Refill: Capillary refill takes less than 2 seconds.      Findings: No rash.   Neurological:      General: No focal deficit present.      Mental Status: She is alert and oriented to person, place, and time.      Cranial Nerves: No cranial nerve deficit.              Significant Labs: All pertinent labs within the past 24 hours have been reviewed.    Significant Imaging: I have reviewed all pertinent imaging results/findings within the past 24 hours.

## 2023-06-03 NOTE — HPI
"33-year-old lady with prior history of ESRD on hemodialysis([place]),  gastroparesis, seizure, C diff infection, diabetes, poor vision presented to ER with abdominal pain, feeling overall poorly.  She admits missing last session of dialysis, as always she is not compliant with her medications or dialysis and is asking for IV Dilaudid.  This patient has extremely poor compliance with medications, dialysis and has numerous ER visits and hospital admissions mainly for similar complaints. She has at least 28 admissions in last year(not counting ER visits), 21 CT scans of abdomen/pelvis/chest in last 6 months(not counting other imaging like x-rays, ultrasounds), has drug-seeking behavior and her abdominal pain mysteriously gets better with IV Dilaudid only.  Upon reviewing  it appears that she routinely gets prescription of gabapentin and numerous prescriptions of opioids for short durations from different providers.  Today upon my evaluation she is not giving any consistent history however was able to tell me that she was hurting "all over" and ran out of her pain medications.  Also reports some nausea, constipation and fatigue otherwise denies any fever, chills, headache, dizziness, chest pain palpitations, bladder or bowel symptoms.     In ER patient was found to have hypervolemia due to missed dialysis, hyperglycemia blood sugar above 500s without anion gap.  She received IV insulin, antiemetics, IV Dilaudid, Ativan with dramatic improvement in her symptoms.   "

## 2023-06-03 NOTE — H&P
"[facility] Medicine  History & Physical    Patient Name: Tabby Howard  MRN: [id number]  Patient Class: OP- Observation  Admission Date: 6/3/2023  Attending Physician: Amalia Jefferson MD   Primary Care Provider: AIDEN CONNER NP         Patient information was obtained from patient, past medical records and ER records.     Subjective:     Principal Problem:Abdominal pain    Chief Complaint:   Chief Complaint   Patient presents with    Abdominal Pain     Hyperglycemia, last Insulin was yesterday morning, last dialysis was thursday    Vomiting        HPI: 33-year-old lady with prior history of ESRD on hemodialysis([facility]),  gastroparesis, seizure, C diff infection, diabetes, poor vision presented to ER with abdominal pain, feeling overall poorly.  She admits missing last session of dialysis, as always she is not compliant with her medications or dialysis and is asking for IV Dilaudid.  This patient has extremely poor compliance with medications, dialysis and has numerous ER visits and hospital admissions mainly for similar complaints. She has at least 28 admissions in last year(not counting ER visits), 21 CT scans of abdomen/pelvis/chest in last 6 months(not counting other imaging like x-rays, ultrasounds), has drug-seeking behavior and her abdominal pain mysteriously gets better with IV Dilaudid only.  Upon reviewing  it appears that she routinely gets prescription of gabapentin and numerous prescriptions of opioids for short durations from different providers.  Today upon my evaluation she is not giving any consistent history however was able to tell me that she was hurting "all over" and ran out of her pain medications.  Also reports some nausea, constipation and fatigue otherwise denies any fever, chills, headache, dizziness, chest pain palpitations, bladder or bowel symptoms.     In ER patient was found to have hypervolemia due to missed dialysis, hyperglycemia blood sugar above " 500s without anion gap.  She received IV insulin, antiemetics, IV Dilaudid, Ativan with dramatic improvement in her symptoms.       Past Medical History:   Diagnosis Date    ESRD on hemodialysis 2022    Gastritis 2022    EGD was 22    Gastroparesis 2022    has not had Emptying study    Heart failure with preserved ejection fraction 2022    EF 55% on 3/22    History of supraventricular tachycardia     Hyperkalemia 2022    Hypertensive emergency 2022    Sickle cell trait 2022    Type 2 diabetes mellitus        Past Surgical History:   Procedure Laterality Date     SECTION      x 3    COLONOSCOPY      COLONOSCOPY N/A 2022    Procedure: COLONOSCOPY;  Surgeon: Jagdeep Cedeno MD;  Location: [facility];  Service: Endoscopy;  Laterality: N/A;    ESOPHAGOGASTRODUODENOSCOPY N/A 10/18/2019    Procedure: ESOPHAGOGASTRODUODENOSCOPY (EGD);  Surgeon: Gianluca Mendez MD;  Location: [facility];  Service: Endoscopy;  Laterality: N/A;    ESOPHAGOGASTRODUODENOSCOPY N/A 2022    Procedure: EGD (ESOPHAGOGASTRODUODENOSCOPY);  Surgeon: Micky Paredes III, MD;  Location: [facility];  Service: Endoscopy;  Laterality: N/A;    ESOPHAGOGASTRODUODENOSCOPY N/A 2022    Procedure: EGD (ESOPHAGOGASTRODUODENOSCOPY);  Surgeon: Marcelo Zhong MD;  Location: [facility];  Service: Endoscopy;  Laterality: N/A;    LAPAROSCOPIC CHOLECYSTECTOMY N/A 2022    Procedure: CHOLECYSTECTOMY, LAPAROSCOPIC;  Surgeon: Grey Perez MD;  Location: [facility];  Service: General;  Laterality: N/A;    PLACEMENT OF DUAL-LUMEN VASCULAR CATHETER Left 2022    Procedure: INSERTION-CATHETER-JOSEPH;  Surgeon: Dionte Gan MD;  Location: [facility] OR;  Service: General;  Laterality: Left;    PLACEMENT OF DUAL-LUMEN VASCULAR CATHETER Right 2022    Procedure: INSERTION-CATHETER-Hemosplit;  Surgeon: Dionte Gan MD;  Location: [facility] OR;  Service: General;  Laterality: Right;       Review of  "patient's allergies indicates:   Allergen Reactions    Penicillins Hives       No current facility-administered medications on file prior to encounter.     Current Outpatient Medications on File Prior to Encounter   Medication Sig    amLODIPine (NORVASC) 5 MG tablet Take 2 tablets (10 mg total) by mouth once daily.    apixaban (ELIQUIS) 5 mg Tab Take 1 tablet (5 mg total) by mouth 2 (two) times daily.    carvediloL (COREG) 25 MG tablet Take 1 tablet (25 mg total) by mouth 2 (two) times daily.    dicyclomine (BENTYL) 10 MG capsule Take 1 capsule (10 mg total) by mouth 4 (four) times daily as needed (abdominal pain).    FLUoxetine 20 MG capsule Take 1 capsule (20 mg total) by mouth once daily.    gabapentin (NEURONTIN) 100 MG capsule Take 1 capsule by mouth 3 times daily (Patient taking differently: Take 100 mg by mouth 3 (three) times daily.)    hydrALAZINE (APRESOLINE) 100 MG tablet Take 1 tablet (100 mg total) by mouth every 8 (eight) hours.    HYDROcodone-acetaminophen (NORCO) 7.5-325 mg per tablet Take 1 tablet by mouth every 6 (six) hours as needed for Pain.    insulin aspart U-100 (NOVOLOG) 100 unit/mL (3 mL) InPn pen Inject 4 Units into the skin 3 (three) times daily with meals.    insulin detemir U-100, Levemir, 100 unit/mL (3 mL) SubQ InPn pen Inject 6 Units into the skin 2 (two) times daily.    isosorbide mononitrate (IMDUR) 30 MG 24 hr tablet Take 3 tablets (90 mg total) by mouth once daily.    levETIRAcetam (KEPPRA) 500 MG Tab Take 1 tablet twice a day by oral route for 30 days. (Patient taking differently: Take 500 mg by mouth 2 (two) times daily.)    pantoprazole (PROTONIX) 40 MG tablet Take 1 tablet every day by oral route for 30 days. (Patient taking differently: Take 40 mg by mouth once daily.)    pen needle, diabetic 31 gauge x 3/16" Ndle Use as directed to inject insulin 5 times daily    sucralfate (CARAFATE) 100 mg/mL suspension Take 10 mLs (1 g total) by mouth 4 (four) times daily before meals " and nightly.    [DISCONTINUED] atenoloL (TENORMIN) 50 MG tablet Take 1 tablet (50 mg total) by mouth every other day.    [DISCONTINUED] omeprazole (PRILOSEC) 20 MG capsule Take 2 capsules (40 mg total) by mouth once daily. for 10 days     Family History       Problem Relation (Age of Onset)    Diabetes Mother, Father          Tobacco Use    Smoking status: Never    Smokeless tobacco: Never   Substance and Sexual Activity    Alcohol use: Not Currently    Drug use: No    Sexual activity: Not Currently     Partners: Male     Birth control/protection: I.U.D.     Review of Systems   Constitutional:  Positive for fatigue. Negative for chills and fever.   HENT:  Negative for sore throat.    Eyes:  Negative for redness and itching.   Respiratory:  Positive for shortness of breath. Negative for chest tightness.    Cardiovascular:  Negative for chest pain and palpitations.   Gastrointestinal:  Positive for abdominal pain and nausea. Negative for blood in stool.   Genitourinary:  Negative for dysuria.   Musculoskeletal:  Negative for gait problem and joint swelling.   Neurological:  Negative for tremors and weakness.   Psychiatric/Behavioral:  Negative for behavioral problems and sleep disturbance.    All other systems reviewed and are negative.  Objective:     Vital Signs (Most Recent):  Temp: 97.7 °F (36.5 °C) (06/03/23 1135)  Pulse: 88 (06/03/23 1344)  Resp: (!) 24 (06/03/23 1246)  BP: (!) 144/76 (06/03/23 1330)  SpO2: 98 % (06/03/23 1344) Vital Signs (24h Range):  Temp:  [97.7 °F (36.5 °C)] 97.7 °F (36.5 °C)  Pulse:  [85-93] 88  Resp:  [22-24] 24  SpO2:  [93 %-98 %] 98 %  BP: (140-161)/() 144/76     Weight: 51.7 kg (114 lb)  Body mass index is 20.85 kg/m².     Physical Exam  Vitals and nursing note reviewed.   Constitutional:       Appearance: She is well-developed.      Comments: Chronically ill-appearing   HENT:      Head: Atraumatic.   Eyes:      Comments: Poor baseline vision   Neck:      Vascular: No JVD.    Cardiovascular:      Rate and Rhythm: Normal rate and regular rhythm.      Heart sounds: Normal heart sounds. No murmur heard.    No gallop.   Pulmonary:      Effort: No respiratory distress.      Breath sounds: Normal breath sounds. No wheezing.   Abdominal:      General: Bowel sounds are normal. There is no distension.      Palpations: Abdomen is soft.      Tenderness: There is no guarding or rebound.   Musculoskeletal:      Cervical back: Neck supple.   Lymphadenopathy:      Cervical: No cervical adenopathy.   Skin:     General: Skin is warm.      Capillary Refill: Capillary refill takes less than 2 seconds.      Findings: No rash.   Neurological:      General: No focal deficit present.      Mental Status: She is alert. Mental status is at baseline.      Cranial Nerves: No cranial nerve deficit.   Psychiatric:         Behavior: Behavior normal.              Significant Labs: All pertinent labs within the past 24 hours have been reviewed.    Significant Imaging: I have reviewed all pertinent imaging results/findings within the past 24 hours.    Assessment/Plan:     33-year-old lady with prior history of dialysis, VTE on Eliquis, uncontrolled hypertension, gastroparesis and several other comorbidities who is extremely noncompliant with her medications, outpatient follow-up as well as dialysis came with as usual abdominal pain nausea found to have hypervolemia due to missed dialysis and hyperglycemia without ketoacidosis    Active Hospital Problems    Diagnosis  POA    *Abdominal pain [R10.9]  Yes     Priority: 1 - High    ESRD (end stage renal disease) [N18.6]  Yes     Priority: 2     Frequent hospital admissions [Z78.9]  Yes     Priority: 3     Drug-seeking behavior [Z76.5]  Yes     Priority: 4     Mood disorder [F39]  Yes    Uncontrolled hypertension [I10]  Yes    Sickle cell trait [D57.3]  Yes     Chronic      Resolved Hospital Problems   No resolved problems to display.       Plan:  Admit to observation  unit  She has at least 28 admissions in last year(not counting ER visits), 21 CT scans of abdomen/pelvis/chest in last 6 months(not counting other imaging like x-rays, ultrasounds), has drug-seeking behavior and her abdominal pain mysteriously gets better with IV Dilaudid only.  Overall presentation is consistent with gastroparesis, hyperglycemia and hypervolemia due to missed dialysis  Consulted Nephrology, going for dialysis soon  Basal bolus insulin regimen, Accu-Cheks, hypoglycemia measures  Resume essential home medications  Avoid IV narcotics, benzodiazepines  Aggressive bowel regimen considering stool burden  Further management as per clinical course    VTE Risk Mitigation (From admission, onward)           Ordered     apixaban tablet 5 mg  2 times daily         06/03/23 1427     IP VTE HIGH RISK PATIENT  Once         06/03/23 1426     Place sequential compression device  Until discontinued         06/03/23 1426                       On 06/03/2023, patient should be placed in hospital observation services under my care.        Amalia Jefferson MD  Department of Hospital Medicine  [facility]

## 2023-06-03 NOTE — CONSULTS
Nephrology Consult Note        Patient Name: Tabby Howard  MRN: [id number]    Patient Class: OP- Observation   Admission Date: 6/3/2023  Length of Stay: 0 days  Date of Service: 6/3/2023    Attending Physician: Amalia Jefferson MD  Primary Care Provider: AIDEN CONNER NP    Reason for Consult: esrd    SUBJECTIVE:     HPI: [id number] with ESRD on HD TTS by Dr. Figueroa and chronic abdominal pain comes with worsening pain. She could not do dialysis today. History is difficult due to pain. She usually feels better after dialysis.    Past Medical History:   Diagnosis Date    ESRD on hemodialysis 2022    Gastritis 2022    EGD was 22    Gastroparesis 2022    has not had Emptying study    Heart failure with preserved ejection fraction 2022    EF 55% on 3/22    History of supraventricular tachycardia     Hyperkalemia 2022    Hypertensive emergency 2022    Sickle cell trait 2022    Type 2 diabetes mellitus      Past Surgical History:   Procedure Laterality Date     SECTION      x 3    COLONOSCOPY      COLONOSCOPY N/A 2022    Procedure: COLONOSCOPY;  Surgeon: Jagdeep Cedeno MD;  Location: [facility];  Service: Endoscopy;  Laterality: N/A;    ESOPHAGOGASTRODUODENOSCOPY N/A 10/18/2019    Procedure: ESOPHAGOGASTRODUODENOSCOPY (EGD);  Surgeon: Gianluca Mendez MD;  Location: [facility];  Service: Endoscopy;  Laterality: N/A;    ESOPHAGOGASTRODUODENOSCOPY N/A 2022    Procedure: EGD (ESOPHAGOGASTRODUODENOSCOPY);  Surgeon: Micky Paredes III, MD;  Location: [facility];  Service: Endoscopy;  Laterality: N/A;    ESOPHAGOGASTRODUODENOSCOPY N/A 2022    Procedure: EGD (ESOPHAGOGASTRODUODENOSCOPY);  Surgeon: Marcelo Zhong MD;  Location: [facility];  Service: Endoscopy;  Laterality: N/A;    LAPAROSCOPIC CHOLECYSTECTOMY N/A 2022    Procedure: CHOLECYSTECTOMY, LAPAROSCOPIC;  Surgeon: Grey Perez MD;  Location: [facility];  Service: General;  Laterality: N/A;     PLACEMENT OF DUAL-LUMEN VASCULAR CATHETER Left 07/12/2022    Procedure: INSERTION-CATHETER-JOSEPH;  Surgeon: Dionte Gan MD;  Location: [facility] OR;  Service: General;  Laterality: Left;    PLACEMENT OF DUAL-LUMEN VASCULAR CATHETER Right 07/26/2022    Procedure: INSERTION-CATHETER-Hemosplit;  Surgeon: Dionte Gan MD;  Location: [facility] OR;  Service: General;  Laterality: Right;     Family History   Problem Relation Age of Onset    Diabetes Mother     Diabetes Father      Social History     Tobacco Use    Smoking status: Never    Smokeless tobacco: Never   Substance Use Topics    Alcohol use: Not Currently    Drug use: No       Review of patient's allergies indicates:   Allergen Reactions    Penicillins Hives       Outpatient meds:  No current facility-administered medications on file prior to encounter.     Current Outpatient Medications on File Prior to Encounter   Medication Sig Dispense Refill    amLODIPine (NORVASC) 5 MG tablet Take 2 tablets (10 mg total) by mouth once daily. 30 tablet 0    apixaban (ELIQUIS) 5 mg Tab Take 1 tablet (5 mg total) by mouth 2 (two) times daily. 60 tablet 2    carvediloL (COREG) 25 MG tablet Take 1 tablet (25 mg total) by mouth 2 (two) times daily. 60 tablet 5    dicyclomine (BENTYL) 10 MG capsule Take 1 capsule (10 mg total) by mouth 4 (four) times daily as needed (abdominal pain). 20 capsule 0    FLUoxetine 20 MG capsule Take 1 capsule (20 mg total) by mouth once daily. 30 capsule 5    gabapentin (NEURONTIN) 100 MG capsule Take 1 capsule by mouth 3 times daily (Patient taking differently: Take 100 mg by mouth 3 (three) times daily.) 90 capsule 2    hydrALAZINE (APRESOLINE) 100 MG tablet Take 1 tablet (100 mg total) by mouth every 8 (eight) hours. 60 tablet 0    HYDROcodone-acetaminophen (NORCO) 7.5-325 mg per tablet Take 1 tablet by mouth every 6 (six) hours as needed for Pain. 30 tablet 0    insulin aspart U-100 (NOVOLOG) 100 unit/mL (3 mL) InPn pen Inject 4 Units into the skin  "3 (three) times daily with meals. 15 mL 3    insulin detemir U-100, Levemir, 100 unit/mL (3 mL) SubQ InPn pen Inject 6 Units into the skin 2 (two) times daily. 15 mL 3    isosorbide mononitrate (IMDUR) 30 MG 24 hr tablet Take 3 tablets (90 mg total) by mouth once daily. 90 tablet 11    levETIRAcetam (KEPPRA) 500 MG Tab Take 1 tablet twice a day by oral route for 30 days. (Patient taking differently: Take 500 mg by mouth 2 (two) times daily.) 60 tablet 2    pantoprazole (PROTONIX) 40 MG tablet Take 1 tablet every day by oral route for 30 days. (Patient taking differently: Take 40 mg by mouth once daily.) 30 tablet 2    pen needle, diabetic 31 gauge x 3/16" Ndle Use as directed to inject insulin 5 times daily 100 each 11    sucralfate (CARAFATE) 100 mg/mL suspension Take 10 mLs (1 g total) by mouth 4 (four) times daily before meals and nightly. 1200 mL 0    [DISCONTINUED] atenoloL (TENORMIN) 50 MG tablet Take 1 tablet (50 mg total) by mouth every other day. 45 tablet 3    [DISCONTINUED] omeprazole (PRILOSEC) 20 MG capsule Take 2 capsules (40 mg total) by mouth once daily. for 10 days 20 capsule 0       Scheduled meds:   polyethylene glycol  17 g Oral TID       Infusions:      PRN meds:      Review of Systems:  Unable to obtain due to pain.    OBJECTIVE:     Vital Signs and IO:  Temp:  [97.7 °F (36.5 °C)]   Pulse:  [85-93]   Resp:  [22-24]   BP: (140-161)/()   SpO2:  [93 %-98 %]   No intake/output data recorded.  Wt Readings from Last 5 Encounters:   06/03/23 51.7 kg (114 lb)   05/16/23 52.7 kg (116 lb 2.9 oz)   05/16/23 52.6 kg (116 lb)   05/12/23 48.7 kg (107 lb 5.8 oz)   05/03/23 54 kg (119 lb)     Body mass index is 20.85 kg/m².    Physical Exam  Constitutional:       General: Patient is not in acute distress.     Appearance: Patient is well-developed. She is not diaphoretic.   HENT:      Head: Normocephalic and atraumatic.      Mouth/Throat: Mucous membranes are moist.   Eyes:      General: No scleral " icterus.     Pupils: Pupils are equal, round, and reactive to light.   Cardiovascular:      Rate and Rhythm: Normal rate and regular rhythm.   Pulmonary:      Effort: Pulmonary effort is normal. No respiratory distress.      Breath sounds: No stridor.   Abdominal:      General: There is no distension.      Palpations: Abdomen is soft.   Musculoskeletal:         General: No deformity. Normal range of motion.      Cervical back: Neck supple.   Skin:     General: Skin is warm and dry.      Findings: No rash present. No erythema.   Neurological:      Mental Status: Patient is alert and oriented to person, place, and time.      Cranial Nerves: No cranial nerve deficit.   Psychiatric:         Behavior: Behavior normal.     Laboratory:  Recent Labs   Lab 06/03/23  1209   *   K 4.7   CL 97   CO2 26   BUN 33*   CREATININE 5.5*   *       Recent Labs   Lab 06/03/23  1209   CALCIUM 9.2   ALBUMIN 3.7   PHOS 4.6*   MG 2.0       Recent Labs   Lab 07/08/22  0516   PTH, Intact 289.8 H       No results for input(s): POCTGLUCOSE in the last 168 hours.    Recent Labs   Lab 08/24/22  0218 12/23/22  1413 03/03/23  0547   Hemoglobin A1C 6.2 7.5 H 5.8       Recent Labs   Lab 06/03/23  1209   WBC 5.10   HGB 9.2*   HCT 27.4*   PLT 74*   MCV 87   MCHC 33.6   MONO 6.5  0.3       Recent Labs   Lab 06/03/23  1209   BILITOT 1.8*   PROT 7.5   ALBUMIN 3.7   ALKPHOS 191*   ALT 29   AST 28       Recent Labs   Lab 10/24/22  0107 11/19/22  1210 02/05/23  0156   Color, UA Yellow Yellow Yellow   Appearance, UA Clear Clear Clear   pH, UA 8.0 8.0 >8.0 A   Specific [place], UA 1.020 1.025 1.020   Protein, UA 4+ 4+ 3+ A   Glucose, UA 4+ A 4+ A 2+ A   Ketones, UA Negative Negative 1+ A   Urobilinogen, UA Negative Negative Negative   Bilirubin (UA) Negative 1+ A 2+ A   Occult Blood UA 2+ A 2+ A 3+ A   Nitrite, UA Negative Negative Negative   RBC, UA 41 H 38 H 15 H   WBC, UA 24 H 25 H 1   Bacteria Negative Negative Rare   Hyaline Casts, UA 6 A 2 A  2 A       Recent Labs   Lab 04/10/23  1103 05/11/23  1401 06/03/23  1240   POC PH 7.372 7.386 7.358   POC PCO2 49.8 H 47.5 H 51.2 H   POC HCO3 29.0 H 28.5 H 28.8 H   POC PO2 19 L 26 L 36 L   POC SATURATED O2 27 L 47 L 65 L   POC BE 4 4 3   Sample VENOUS VENOUS VENOUS       Microbiology Results (last 7 days)       ** No results found for the last 168 hours. **            ASSESSMENT/PLAN:     ESRD on HD TTS via AVF  Next HD per schedule.  Continue current dialysis prescription.  Renal diet - low K, low phos.  No IVs or BP checks on access and/or non-dominant arm.    Anemia of CKD  Hgb and HCT are acceptable. Monitor for now.  Will provide SHELLEY and/or IV iron PRN.    MBD / Secondary HPT  Ca, Phos, PTH and vitamin D levels are acceptable.   Phos binders, vitamin D and analogues, calcimimetics will be given as needed.    HTN  BP seem controlled.   Tolerate asymptomatic HTN up to -160.  Continue home meds.  Low sodium diet.    Chronic abdominal pain  Management per primary team.    Thank you for allowing us to participate in the care of your patient!   We will follow the patient and provide recommendations as needed.    Patient care time was spent personally by me on the following activities:     Obtaining a history.  Examination of patient.  Providing medical care at the patients bedside.  Developing a treatment plan with patient or surrogate and bedside caregivers.  Ordering and reviewing laboratory studies, radiographic studies, pulse oximetry.  Ordering and performing treatments and interventions.  Evaluation of patient's response to treatment.  Discussions with consultants while on the unit and immediately available to the patient.  Re-evaluation of the patient's condition.  Documentation in the medical record.     Mando Benitez MD    [facility]  [address] [place] [address]  JEANMARIE Connolly [address]    [phone] - tel  [phone] - fax    6/3/2023

## 2023-06-03 NOTE — SUBJECTIVE & OBJECTIVE
Past Medical History:   Diagnosis Date    ESRD on hemodialysis 2022    Gastritis 2022    EGD was 22    Gastroparesis 2022    has not had Emptying study    Heart failure with preserved ejection fraction 2022    EF 55% on 3/22    History of supraventricular tachycardia     Hyperkalemia 2022    Hypertensive emergency 2022    Sickle cell trait 2022    Type 2 diabetes mellitus        Past Surgical History:   Procedure Laterality Date     SECTION      x 3    COLONOSCOPY      COLONOSCOPY N/A 2022    Procedure: COLONOSCOPY;  Surgeon: Jagdeep Cedeno MD;  Location: [facility];  Service: Endoscopy;  Laterality: N/A;    ESOPHAGOGASTRODUODENOSCOPY N/A 10/18/2019    Procedure: ESOPHAGOGASTRODUODENOSCOPY (EGD);  Surgeon: Gianluca Mendez MD;  Location: [facility];  Service: Endoscopy;  Laterality: N/A;    ESOPHAGOGASTRODUODENOSCOPY N/A 2022    Procedure: EGD (ESOPHAGOGASTRODUODENOSCOPY);  Surgeon: Micky Paredes III, MD;  Location: [facility];  Service: Endoscopy;  Laterality: N/A;    ESOPHAGOGASTRODUODENOSCOPY N/A 2022    Procedure: EGD (ESOPHAGOGASTRODUODENOSCOPY);  Surgeon: Marcelo Zhong MD;  Location: [facility];  Service: Endoscopy;  Laterality: N/A;    LAPAROSCOPIC CHOLECYSTECTOMY N/A 2022    Procedure: CHOLECYSTECTOMY, LAPAROSCOPIC;  Surgeon: Grey Perez MD;  Location: [facility];  Service: General;  Laterality: N/A;    PLACEMENT OF DUAL-LUMEN VASCULAR CATHETER Left 2022    Procedure: INSERTION-CATHETER-JOSEPH;  Surgeon: Dionte Gan MD;  Location: [facility] OR;  Service: General;  Laterality: Left;    PLACEMENT OF DUAL-LUMEN VASCULAR CATHETER Right 2022    Procedure: INSERTION-CATHETER-Hemosplit;  Surgeon: Dionte Gan MD;  Location: [facility] OR;  Service: General;  Laterality: Right;       Review of patient's allergies indicates:   Allergen Reactions    Penicillins Hives       No current facility-administered medications on file  "prior to encounter.     Current Outpatient Medications on File Prior to Encounter   Medication Sig    amLODIPine (NORVASC) 5 MG tablet Take 2 tablets (10 mg total) by mouth once daily.    apixaban (ELIQUIS) 5 mg Tab Take 1 tablet (5 mg total) by mouth 2 (two) times daily.    carvediloL (COREG) 25 MG tablet Take 1 tablet (25 mg total) by mouth 2 (two) times daily.    dicyclomine (BENTYL) 10 MG capsule Take 1 capsule (10 mg total) by mouth 4 (four) times daily as needed (abdominal pain).    FLUoxetine 20 MG capsule Take 1 capsule (20 mg total) by mouth once daily.    gabapentin (NEURONTIN) 100 MG capsule Take 1 capsule by mouth 3 times daily (Patient taking differently: Take 100 mg by mouth 3 (three) times daily.)    hydrALAZINE (APRESOLINE) 100 MG tablet Take 1 tablet (100 mg total) by mouth every 8 (eight) hours.    HYDROcodone-acetaminophen (NORCO) 7.5-325 mg per tablet Take 1 tablet by mouth every 6 (six) hours as needed for Pain.    insulin aspart U-100 (NOVOLOG) 100 unit/mL (3 mL) InPn pen Inject 4 Units into the skin 3 (three) times daily with meals.    insulin detemir U-100, Levemir, 100 unit/mL (3 mL) SubQ InPn pen Inject 6 Units into the skin 2 (two) times daily.    isosorbide mononitrate (IMDUR) 30 MG 24 hr tablet Take 3 tablets (90 mg total) by mouth once daily.    levETIRAcetam (KEPPRA) 500 MG Tab Take 1 tablet twice a day by oral route for 30 days. (Patient taking differently: Take 500 mg by mouth 2 (two) times daily.)    pantoprazole (PROTONIX) 40 MG tablet Take 1 tablet every day by oral route for 30 days. (Patient taking differently: Take 40 mg by mouth once daily.)    pen needle, diabetic 31 gauge x 3/16" Ndle Use as directed to inject insulin 5 times daily    sucralfate (CARAFATE) 100 mg/mL suspension Take 10 mLs (1 g total) by mouth 4 (four) times daily before meals and nightly.    [DISCONTINUED] atenoloL (TENORMIN) 50 MG tablet Take 1 tablet (50 mg total) by mouth every other day.    " [DISCONTINUED] omeprazole (PRILOSEC) 20 MG capsule Take 2 capsules (40 mg total) by mouth once daily. for 10 days     Family History       Problem Relation (Age of Onset)    Diabetes Mother, Father          Tobacco Use    Smoking status: Never    Smokeless tobacco: Never   Substance and Sexual Activity    Alcohol use: Not Currently    Drug use: No    Sexual activity: Not Currently     Partners: Male     Birth control/protection: I.U.D.     Review of Systems   Constitutional:  Positive for fatigue. Negative for chills and fever.   HENT:  Negative for sore throat.    Eyes:  Negative for redness and itching.   Respiratory:  Positive for shortness of breath. Negative for chest tightness.    Cardiovascular:  Negative for chest pain and palpitations.   Gastrointestinal:  Positive for abdominal pain and nausea. Negative for blood in stool.   Genitourinary:  Negative for dysuria.   Musculoskeletal:  Negative for gait problem and joint swelling.   Neurological:  Negative for tremors and weakness.   Psychiatric/Behavioral:  Negative for behavioral problems and sleep disturbance.    All other systems reviewed and are negative.  Objective:     Vital Signs (Most Recent):  Temp: 97.7 °F (36.5 °C) (06/03/23 1135)  Pulse: 88 (06/03/23 1344)  Resp: (!) 24 (06/03/23 1246)  BP: (!) 144/76 (06/03/23 1330)  SpO2: 98 % (06/03/23 1344) Vital Signs (24h Range):  Temp:  [97.7 °F (36.5 °C)] 97.7 °F (36.5 °C)  Pulse:  [85-93] 88  Resp:  [22-24] 24  SpO2:  [93 %-98 %] 98 %  BP: (140-161)/() 144/76     Weight: 51.7 kg (114 lb)  Body mass index is 20.85 kg/m².     Physical Exam  Vitals and nursing note reviewed.   Constitutional:       Appearance: She is well-developed.      Comments: Chronically ill-appearing   HENT:      Head: Atraumatic.   Eyes:      Comments: Poor baseline vision   Neck:      Vascular: No JVD.   Cardiovascular:      Rate and Rhythm: Normal rate and regular rhythm.      Heart sounds: Normal heart sounds. No murmur  heard.    No gallop.   Pulmonary:      Effort: No respiratory distress.      Breath sounds: Normal breath sounds. No wheezing.   Abdominal:      General: Bowel sounds are normal. There is no distension.      Palpations: Abdomen is soft.      Tenderness: There is no guarding or rebound.   Musculoskeletal:      Cervical back: Neck supple.   Lymphadenopathy:      Cervical: No cervical adenopathy.   Skin:     General: Skin is warm.      Capillary Refill: Capillary refill takes less than 2 seconds.      Findings: No rash.   Neurological:      General: No focal deficit present.      Mental Status: She is alert. Mental status is at baseline.      Cranial Nerves: No cranial nerve deficit.   Psychiatric:         Behavior: Behavior normal.              Significant Labs: All pertinent labs within the past 24 hours have been reviewed.    Significant Imaging: I have reviewed all pertinent imaging results/findings within the past 24 hours.

## 2023-06-04 VITALS
RESPIRATION RATE: 18 BRPM | HEIGHT: 62 IN | TEMPERATURE: 98 F | HEART RATE: 79 BPM | OXYGEN SATURATION: 96 % | SYSTOLIC BLOOD PRESSURE: 132 MMHG | WEIGHT: 109.81 LBS | DIASTOLIC BLOOD PRESSURE: 71 MMHG | BODY MASS INDEX: 20.21 KG/M2

## 2023-06-04 LAB
ALBUMIN SERPL BCP-MCNC: 3.2 G/DL (ref 3.5–5.2)
ALP SERPL-CCNC: 175 U/L (ref 55–135)
ALT SERPL W/O P-5'-P-CCNC: 28 U/L (ref 10–44)
ANION GAP SERPL CALC-SCNC: 10 MMOL/L (ref 8–16)
AST SERPL-CCNC: 27 U/L (ref 10–40)
BASOPHILS # BLD AUTO: 0.02 K/UL (ref 0–0.2)
BASOPHILS NFR BLD: 0.6 % (ref 0–1.9)
BILIRUB SERPL-MCNC: 1.7 MG/DL (ref 0.1–1)
BUN SERPL-MCNC: 18 MG/DL (ref 6–20)
CALCIUM SERPL-MCNC: 9.1 MG/DL (ref 8.7–10.5)
CHLORIDE SERPL-SCNC: 98 MMOL/L (ref 95–110)
CO2 SERPL-SCNC: 27 MMOL/L (ref 23–29)
CREAT SERPL-MCNC: 3.6 MG/DL (ref 0.5–1.4)
DIFFERENTIAL METHOD: ABNORMAL
EOSINOPHIL # BLD AUTO: 0.1 K/UL (ref 0–0.5)
EOSINOPHIL NFR BLD: 4.2 % (ref 0–8)
ERYTHROCYTE [DISTWIDTH] IN BLOOD BY AUTOMATED COUNT: 16.7 % (ref 11.5–14.5)
EST. GFR  (NO RACE VARIABLE): 16.3 ML/MIN/1.73 M^2
GLUCOSE SERPL-MCNC: 391 MG/DL (ref 70–110)
GLUCOSE SERPL-MCNC: 407 MG/DL (ref 70–110)
GLUCOSE SERPL-MCNC: 441 MG/DL (ref 70–110)
HCT VFR BLD AUTO: 26.4 % (ref 37–48.5)
HGB BLD-MCNC: 9 G/DL (ref 12–16)
IMM GRANULOCYTES # BLD AUTO: 0.02 K/UL (ref 0–0.04)
IMM GRANULOCYTES NFR BLD AUTO: 0.6 % (ref 0–0.5)
LYMPHOCYTES # BLD AUTO: 0.6 K/UL (ref 1–4.8)
LYMPHOCYTES NFR BLD: 17.4 % (ref 18–48)
MAGNESIUM SERPL-MCNC: 1.9 MG/DL (ref 1.6–2.6)
MCH RBC QN AUTO: 29.9 PG (ref 27–31)
MCHC RBC AUTO-ENTMCNC: 34.1 G/DL (ref 32–36)
MCV RBC AUTO: 88 FL (ref 82–98)
MONOCYTES # BLD AUTO: 0.2 K/UL (ref 0.3–1)
MONOCYTES NFR BLD: 6.3 % (ref 4–15)
NEUTROPHILS # BLD AUTO: 2.4 K/UL (ref 1.8–7.7)
NEUTROPHILS NFR BLD: 70.9 % (ref 38–73)
NRBC BLD-RTO: 0 /100 WBC
PHOSPHATE SERPL-MCNC: 4.1 MG/DL (ref 2.7–4.5)
PLATELET # BLD AUTO: 75 K/UL (ref 150–450)
PMV BLD AUTO: 10.3 FL (ref 9.2–12.9)
POTASSIUM SERPL-SCNC: 4.4 MMOL/L (ref 3.5–5.1)
PROT SERPL-MCNC: 6.6 G/DL (ref 6–8.4)
RBC # BLD AUTO: 3.01 M/UL (ref 4–5.4)
SODIUM SERPL-SCNC: 135 MMOL/L (ref 136–145)
WBC # BLD AUTO: 3.33 K/UL (ref 3.9–12.7)

## 2023-06-04 PROCEDURE — 85025 COMPLETE CBC W/AUTO DIFF WBC: CPT | Performed by: HOSPITALIST

## 2023-06-04 PROCEDURE — 80053 COMPREHEN METABOLIC PANEL: CPT | Performed by: HOSPITALIST

## 2023-06-04 PROCEDURE — 84100 ASSAY OF PHOSPHORUS: CPT | Performed by: HOSPITALIST

## 2023-06-04 PROCEDURE — 25000003 PHARM REV CODE 250: Performed by: INTERNAL MEDICINE

## 2023-06-04 PROCEDURE — 83735 ASSAY OF MAGNESIUM: CPT | Performed by: HOSPITALIST

## 2023-06-04 PROCEDURE — G0378 HOSPITAL OBSERVATION PER HR: HCPCS

## 2023-06-04 PROCEDURE — 96372 THER/PROPH/DIAG INJ SC/IM: CPT | Performed by: HOSPITALIST

## 2023-06-04 PROCEDURE — 25000003 PHARM REV CODE 250: Performed by: HOSPITALIST

## 2023-06-04 PROCEDURE — 36415 COLL VENOUS BLD VENIPUNCTURE: CPT | Performed by: HOSPITALIST

## 2023-06-04 PROCEDURE — 96372 THER/PROPH/DIAG INJ SC/IM: CPT | Performed by: INTERNAL MEDICINE

## 2023-06-04 PROCEDURE — 63600175 PHARM REV CODE 636 W HCPCS: Performed by: HOSPITALIST

## 2023-06-04 RX ADMIN — CARVEDILOL 25 MG: 25 TABLET, FILM COATED ORAL at 09:06

## 2023-06-04 RX ADMIN — GABAPENTIN 100 MG: 100 CAPSULE ORAL at 09:06

## 2023-06-04 RX ADMIN — ISOSORBIDE MONONITRATE 90 MG: 60 TABLET, EXTENDED RELEASE ORAL at 09:06

## 2023-06-04 RX ADMIN — SUCRALFATE 1 G: 1 SUSPENSION ORAL at 05:06

## 2023-06-04 RX ADMIN — HYDROCODONE BITARTRATE AND ACETAMINOPHEN 1 TABLET: 10; 325 TABLET ORAL at 03:06

## 2023-06-04 RX ADMIN — INSULIN ASPART 10 UNITS: 100 INJECTION, SOLUTION INTRAVENOUS; SUBCUTANEOUS at 07:06

## 2023-06-04 RX ADMIN — AMLODIPINE BESYLATE 10 MG: 5 TABLET ORAL at 09:06

## 2023-06-04 RX ADMIN — INSULIN ASPART 10 UNITS: 100 INJECTION, SOLUTION INTRAVENOUS; SUBCUTANEOUS at 11:06

## 2023-06-04 RX ADMIN — SUCRALFATE 1 G: 1 SUSPENSION ORAL at 11:06

## 2023-06-04 RX ADMIN — APIXABAN 5 MG: 5 TABLET, FILM COATED ORAL at 09:06

## 2023-06-04 RX ADMIN — INSULIN DETEMIR 8 UNITS: 100 INJECTION, SOLUTION SUBCUTANEOUS at 12:06

## 2023-06-04 RX ADMIN — HYDRALAZINE HYDROCHLORIDE 100 MG: 25 TABLET ORAL at 05:06

## 2023-06-04 RX ADMIN — LEVETIRACETAM 500 MG: 500 TABLET, FILM COATED ORAL at 09:06

## 2023-06-04 RX ADMIN — FLUOXETINE 20 MG: 20 CAPSULE ORAL at 09:06

## 2023-06-04 RX ADMIN — MUPIROCIN 1 G: 20 OINTMENT TOPICAL at 09:06

## 2023-06-04 RX ADMIN — HYDROCODONE BITARTRATE AND ACETAMINOPHEN 1 TABLET: 5; 325 TABLET ORAL at 09:06

## 2023-06-04 RX ADMIN — PANTOPRAZOLE SODIUM 40 MG: 40 TABLET, DELAYED RELEASE ORAL at 09:06

## 2023-06-04 NOTE — PLAN OF CARE
Patient cleared for discharge from case management standpoint.    Chart and discharge orders reviewed.  Patient discharged home with no further case management needs.       06/04/23 1245   Final Note   Assessment Type Final Discharge Note   Anticipated Discharge Disposition Home   What phone number can be called within the next 1-3 days to see how you are doing after discharge? [phone]   Post-Acute Status   Discharge Delays None known at this time

## 2023-06-04 NOTE — PLAN OF CARE
Problem: Infection  Goal: Absence of Infection Signs and Symptoms  Outcome: Ongoing, Not Progressing     Problem: Adult Inpatient Plan of Care  Goal: Plan of Care Review  Outcome: Ongoing, Not Progressing     Problem: Adult Inpatient Plan of Care  Goal: Patient-Specific Goal (Individualized)  Outcome: Ongoing, Not Progressing     Problem: Infection (Hemodialysis)  Goal: Absence of Infection Signs and Symptoms  Outcome: Ongoing, Not Progressing     Problem: Hemodynamic Instability (Hemodialysis)  Goal: Effective Tissue Perfusion  Outcome: Ongoing, Not Progressing     Problem: Diabetes Comorbidity  Goal: Blood Glucose Level Within Targeted Range  Outcome: Ongoing, Not Progressing     Problem: Adjustment to Illness (Sepsis/Septic Shock)  Goal: Optimal Coping  Outcome: Ongoing, Not Progressing     Problem: Glycemic Control Impaired (Sepsis/Septic Shock)  Goal: Blood Glucose Level Within Desired Range  Outcome: Ongoing, Not Progressing     Problem: Nutrition Impaired (Sepsis/Septic Shock)  Goal: Optimal Nutrition Intake  Outcome: Ongoing, Not Progressing

## 2023-06-04 NOTE — PLAN OF CARE
[facility]  Initial Discharge Assessment       Primary Care Provider: AIDEN CONNER NP    Admission Diagnosis: ESRD (end stage renal disease) [N18.6]    Admission Date: 6/3/2023  Expected Discharge Date: TBD    Patient is established OP HD patient at [facility] T,Th,Sat 11 am. Patient to return home with parents once medically cleared for discharge.     Transition of Care Barriers: None    Payor: MEDICAID / Plan: [place] / Product Type: Managed Medicaid /     Extended Emergency Contact Information  Primary Emergency Contact: Tanya Howard  Address: [address] [place] [address], [place] [address] [facility]  Home Phone: [phone]  Mobile Phone: [phone]  Relation: Step parent  Preferred language: English   needed? No  Secondary Emergency Contact: Garcia Howard  Address: [address] [place] [address], [place] [address]  Mobile Phone: [phone]  Relation: Father  Preferred language: English   needed? No    Discharge Plan A: Home with family  Discharge Plan B: Home with family      [facility]  [address]  [facility] [address]  Phone: [phone] Fax: [phone]      Initial Assessment (most recent)       Adult Discharge Assessment - 06/04/23 0901          Discharge Assessment    Assessment Type Discharge Planning Assessment     Confirmed/corrected address, phone number and insurance Yes     Source of Information health record     Communicated TATIANA with patient/caregiver Date not available/Unable to determine     Reason For Admission Abdominal pain     People in Home parent(s)     Facility Arrived From: home     Do you expect to return to your current living situation? Yes     Do you have help at home or someone to help you manage your care at home? Yes     Who are your caregiver(s) and their phone number(s)? Step Mother / Tanya / [phone]     Prior to hospitilization cognitive status: Alert/Oriented      Current cognitive status: Alert/Oriented     Walking or Climbing Stairs ambulation difficulty, requires equipment     Home Accessibility wheelchair accessible     Home Layout Able to live on 1st floor     Equipment Currently Used at Home walker, rolling;glucometer     Readmission within 30 days? Yes     Patient currently being followed by outpatient case management? Yes     If yes, name of outpatient case management following: insurance company assigned oupatient case management     Do you currently have service(s) that help you manage your care at home? No     Do you take prescription medications? Yes     Do you have prescription coverage? Yes     Coverage Payor:  MEDICAID - [employer] [place] [place]     Do you have any problems affording any of your prescribed medications? No     Is the patient taking medications as prescribed? yes     Who is going to help you get home at discharge? Step Mother / Tanya / [phone]     How do you get to doctors appointments? family or friend will provide     Are you on dialysis? Yes     Dialysis Name and Scheduled days [facility] T,Th,Sat 11 am     Do you take coumadin? No     Discharge Plan A Home with family     Discharge Plan B Home with family     DME Needed Upon Discharge  none     Transition of Care Barriers None

## 2023-06-04 NOTE — PROGRESS NOTES
Nephrology Consult Note        Patient Name: Tabby Howard  MRN: [id number]    Patient Class: OP- Observation   Admission Date: 6/3/2023  Length of Stay: 0 days  Date of Service: 2023    Attending Physician: Bryant Jeff MD  Primary Care Provider: AIDEN CONNER NP    Reason for Consult: esrd    SUBJECTIVE:     HPI: 33AAF with ESRD on HD TTS by Dr. Figueroa and chronic abdominal pain comes with worsening pain. She could not do dialysis today. History is difficult due to pain. She usually feels better after dialysis.     VSS, tolerated HD. Next HD Tue or PRN. OK to dc per primary team.    Past Medical History:   Diagnosis Date    ESRD on hemodialysis 2022    Gastritis 2022    EGD was 22    Gastroparesis 2022    has not had Emptying study    Heart failure with preserved ejection fraction 2022    EF 55% on 3/22    History of supraventricular tachycardia     Hyperkalemia 2022    Hypertensive emergency 2022    Sickle cell trait 2022    Type 2 diabetes mellitus      Past Surgical History:   Procedure Laterality Date     SECTION      x 3    COLONOSCOPY      COLONOSCOPY N/A 2022    Procedure: COLONOSCOPY;  Surgeon: Jagdeep Cedeno MD;  Location: [facility];  Service: Endoscopy;  Laterality: N/A;    ESOPHAGOGASTRODUODENOSCOPY N/A 10/18/2019    Procedure: ESOPHAGOGASTRODUODENOSCOPY (EGD);  Surgeon: Gianluca Mendez MD;  Location: [facility];  Service: Endoscopy;  Laterality: N/A;    ESOPHAGOGASTRODUODENOSCOPY N/A 2022    Procedure: EGD (ESOPHAGOGASTRODUODENOSCOPY);  Surgeon: Micky Paredes III, MD;  Location: [facility];  Service: Endoscopy;  Laterality: N/A;    ESOPHAGOGASTRODUODENOSCOPY N/A 2022    Procedure: EGD (ESOPHAGOGASTRODUODENOSCOPY);  Surgeon: Marcelo Zhong MD;  Location: [facility];  Service: Endoscopy;  Laterality: N/A;    LAPAROSCOPIC CHOLECYSTECTOMY N/A 2022    Procedure: CHOLECYSTECTOMY, LAPAROSCOPIC;  Surgeon: Grey MO  MD Chris;  Location: [facility] OR;  Service: General;  Laterality: N/A;    PLACEMENT OF DUAL-LUMEN VASCULAR CATHETER Left 07/12/2022    Procedure: INSERTION-CATHETER-JOSEPH;  Surgeon: Dionte Gan MD;  Location: [facility] OR;  Service: General;  Laterality: Left;    PLACEMENT OF DUAL-LUMEN VASCULAR CATHETER Right 07/26/2022    Procedure: INSERTION-CATHETER-Hemosplit;  Surgeon: Dionte Gan MD;  Location: [facility] OR;  Service: General;  Laterality: Right;     Family History   Problem Relation Age of Onset    Diabetes Mother     Diabetes Father      Social History     Tobacco Use    Smoking status: Never    Smokeless tobacco: Never   Substance Use Topics    Alcohol use: Not Currently    Drug use: No       Review of patient's allergies indicates:   Allergen Reactions    Penicillins Hives       Outpatient meds:  No current facility-administered medications on file prior to encounter.     Current Outpatient Medications on File Prior to Encounter   Medication Sig Dispense Refill    amLODIPine (NORVASC) 5 MG tablet Take 2 tablets (10 mg total) by mouth once daily. 30 tablet 0    apixaban (ELIQUIS) 5 mg Tab Take 1 tablet (5 mg total) by mouth 2 (two) times daily. 60 tablet 2    carvediloL (COREG) 25 MG tablet Take 1 tablet (25 mg total) by mouth 2 (two) times daily. 60 tablet 5    dicyclomine (BENTYL) 10 MG capsule Take 1 capsule (10 mg total) by mouth 4 (four) times daily as needed (abdominal pain). 20 capsule 0    FLUoxetine 20 MG capsule Take 1 capsule (20 mg total) by mouth once daily. 30 capsule 5    gabapentin (NEURONTIN) 100 MG capsule Take 1 capsule by mouth 3 times daily (Patient taking differently: Take 100 mg by mouth 3 (three) times daily.) 90 capsule 2    hydrALAZINE (APRESOLINE) 100 MG tablet Take 1 tablet (100 mg total) by mouth every 8 (eight) hours. 60 tablet 0    HYDROcodone-acetaminophen (NORCO) 7.5-325 mg per tablet Take 1 tablet by mouth every 6 (six) hours as needed for Pain. 30 tablet 0    insulin aspart  "U-100 (NOVOLOG) 100 unit/mL (3 mL) InPn pen Inject 4 Units into the skin 3 (three) times daily with meals. 15 mL 3    insulin detemir U-100, Levemir, 100 unit/mL (3 mL) SubQ InPn pen Inject 6 Units into the skin 2 (two) times daily. 15 mL 3    isosorbide mononitrate (IMDUR) 30 MG 24 hr tablet Take 3 tablets (90 mg total) by mouth once daily. 90 tablet 11    levETIRAcetam (KEPPRA) 500 MG Tab Take 1 tablet twice a day by oral route for 30 days. (Patient taking differently: Take 500 mg by mouth 2 (two) times daily.) 60 tablet 2    pantoprazole (PROTONIX) 40 MG tablet Take 1 tablet every day by oral route for 30 days. (Patient taking differently: Take 40 mg by mouth once daily.) 30 tablet 2    pen needle, diabetic 31 gauge x 3/16" Ndle Use as directed to inject insulin 5 times daily 100 each 11    sucralfate (CARAFATE) 100 mg/mL suspension Take 10 mLs (1 g total) by mouth 4 (four) times daily before meals and nightly. 1200 mL 0    [DISCONTINUED] atenoloL (TENORMIN) 50 MG tablet Take 1 tablet (50 mg total) by mouth every other day. 45 tablet 3    [DISCONTINUED] omeprazole (PRILOSEC) 20 MG capsule Take 2 capsules (40 mg total) by mouth once daily. for 10 days 20 capsule 0       Scheduled meds:   amLODIPine  10 mg Oral Daily    apixaban  5 mg Oral BID    carvediloL  25 mg Oral BID    FLUoxetine  20 mg Oral Daily    gabapentin  100 mg Oral BID    hydrALAZINE  100 mg Oral Q8H    insulin detemir U-100  10 Units Subcutaneous QHS    isosorbide mononitrate  90 mg Oral Daily    levETIRAcetam  500 mg Oral BID    mupirocin   Nasal BID    pantoprazole  40 mg Oral Daily    polyethylene glycol  17 g Oral TID    sucralfate  1 g Oral QID (AC & HS)       Infusions:      PRN meds:      Review of Systems:    OBJECTIVE:     Vital Signs and IO:  Temp:  [97.4 °F (36.3 °C)-98.5 °F (36.9 °C)]   Pulse:  [63-93]   Resp:  [16-24]   BP: (131-172)/()   SpO2:  [93 %-99 %]   I/O last 3 completed shifts:  In: 740 [P.O.:240; Other:500]  Out: 3500 " [Other:3500]  Wt Readings from Last 5 Encounters:   06/04/23 49.8 kg (109 lb 12.6 oz)   05/16/23 52.7 kg (116 lb 2.9 oz)   05/16/23 52.6 kg (116 lb)   05/12/23 48.7 kg (107 lb 5.8 oz)   05/03/23 54 kg (119 lb)     Body mass index is 20.08 kg/m².    Physical Exam  Constitutional:       General: Patient is not in acute distress.     Appearance: Patient is well-developed. She is not diaphoretic.   HENT:      Head: Normocephalic and atraumatic.      Mouth/Throat: Mucous membranes are moist.   Eyes:      General: No scleral icterus.     Pupils: Pupils are equal, round, and reactive to light.   Cardiovascular:      Rate and Rhythm: Normal rate and regular rhythm.   Pulmonary:      Effort: Pulmonary effort is normal. No respiratory distress.      Breath sounds: No stridor.   Abdominal:      General: There is no distension.      Palpations: Abdomen is soft.   Musculoskeletal:         General: No deformity. Normal range of motion.      Cervical back: Neck supple.   Skin:     General: Skin is warm and dry.      Findings: No rash present. No erythema.   Neurological:      Mental Status: Patient is alert and oriented to person, place, and time.      Cranial Nerves: No cranial nerve deficit.   Psychiatric:         Behavior: Behavior normal.     Laboratory:  Recent Labs   Lab 06/03/23  1209 06/04/23  0449   * 135*   K 4.7 4.4   CL 97 98   CO2 26 27   BUN 33* 18   CREATININE 5.5* 3.6*   * 391*         Recent Labs   Lab 06/03/23  1209 06/04/23  0449   CALCIUM 9.2 9.1   ALBUMIN 3.7 3.2*   PHOS 4.6* 4.1   MG 2.0 1.9         Recent Labs   Lab 07/08/22  0516   PTH, Intact 289.8 H         No results for input(s): POCTGLUCOSE in the last 168 hours.    Recent Labs   Lab 08/24/22  0218 12/23/22  1413 03/03/23  0547   Hemoglobin A1C 6.2 7.5 H 5.8         Recent Labs   Lab 06/03/23  1209 06/04/23  0449   WBC 5.10 3.33*   HGB 9.2* 9.0*   HCT 27.4* 26.4*   PLT 74* 75*   MCV 87 88   MCHC 33.6 34.1   MONO 6.5  0.3 6.3  0.2*          Recent Labs   Lab 06/03/23  1209 06/04/23  0449   BILITOT 1.8* 1.7*   PROT 7.5 6.6   ALBUMIN 3.7 3.2*   ALKPHOS 191* 175*   ALT 29 28   AST 28 27         Recent Labs   Lab 10/24/22  0107 11/19/22  1210 02/05/23  0156   Color, UA Yellow Yellow Yellow   Appearance, UA Clear Clear Clear   pH, UA 8.0 8.0 >8.0 A   Specific Freeborn, UA 1.020 1.025 1.020   Protein, UA 4+ 4+ 3+ A   Glucose, UA 4+ A 4+ A 2+ A   Ketones, UA Negative Negative 1+ A   Urobilinogen, UA Negative Negative Negative   Bilirubin (UA) Negative 1+ A 2+ A   Occult Blood UA 2+ A 2+ A 3+ A   Nitrite, UA Negative Negative Negative   RBC, UA 41 H 38 H 15 H   WBC, UA 24 H 25 H 1   Bacteria Negative Negative Rare   Hyaline Casts, UA 6 A 2 A 2 A         Recent Labs   Lab 04/10/23  1103 05/11/23  1401 06/03/23  1240   POC PH 7.372 7.386 7.358   POC PCO2 49.8 H 47.5 H 51.2 H   POC HCO3 29.0 H 28.5 H 28.8 H   POC PO2 19 L 26 L 36 L   POC SATURATED O2 27 L 47 L 65 L   POC BE 4 4 3   Sample VENOUS VENOUS VENOUS         Microbiology Results (last 7 days)       ** No results found for the last 168 hours. **            ASSESSMENT/PLAN:     ESRD on HD TTS via AVF  Next HD per schedule.  Continue current dialysis prescription.  Renal diet - low K, low phos.  No IVs or BP checks on access and/or non-dominant arm.    Anemia of CKD  Hgb and HCT are acceptable. Monitor for now.  Will provide SHELLEY and/or IV iron PRN.    MBD / Secondary HPT  Ca, Phos, PTH and vitamin D levels are acceptable.   Phos binders, vitamin D and analogues, calcimimetics will be given as needed.    HTN  BP seem controlled.   Tolerate asymptomatic HTN up to -160.  Continue home meds.  Low sodium diet.    Chronic abdominal pain  Management per primary team.    Thank you for allowing us to participate in the care of your patient!   We will follow the patient and provide recommendations as needed.    Patient care time was spent personally by me on the following activities:     Obtaining a  history.  Examination of patient.  Providing medical care at the patients bedside.  Developing a treatment plan with patient or surrogate and bedside caregivers.  Ordering and reviewing laboratory studies, radiographic studies, pulse oximetry.  Ordering and performing treatments and interventions.  Evaluation of patient's response to treatment.  Discussions with consultants while on the unit and immediately available to the patient.  Re-evaluation of the patient's condition.  Documentation in the medical record.     Mando Benitez MD    [facility]  [address] [place] [id number]    [phone] - tel  [phone] - fax    6/4/2023

## 2023-06-05 ENCOUNTER — HOSPITAL ENCOUNTER (EMERGENCY)
Facility: HOSPITAL | Age: 33
Discharge: HOME | End: 2023-06-05
Attending: EMERGENCY MEDICINE
Payer: MEDICAID

## 2023-06-05 VITALS
HEIGHT: 62 IN | OXYGEN SATURATION: 100 % | SYSTOLIC BLOOD PRESSURE: 180 MMHG | RESPIRATION RATE: 16 BRPM | HEART RATE: 92 BPM | BODY MASS INDEX: 21.9 KG/M2 | DIASTOLIC BLOOD PRESSURE: 118 MMHG | TEMPERATURE: 97 F | WEIGHT: 119 LBS

## 2023-06-05 DIAGNOSIS — R11.2 NAUSEA AND VOMITING, UNSPECIFIED VOMITING TYPE: ICD-10-CM

## 2023-06-05 DIAGNOSIS — R10.13 EPIGASTRIC ABDOMINAL PAIN: Primary | ICD-10-CM

## 2023-06-05 DIAGNOSIS — R11.10 VOMITING, UNSPECIFIED VOMITING TYPE, UNSPECIFIED WHETHER NAUSEA PRESENT: ICD-10-CM

## 2023-06-05 DIAGNOSIS — R52 PAIN: ICD-10-CM

## 2023-06-05 DIAGNOSIS — K31.84 GASTROPARESIS: ICD-10-CM

## 2023-06-05 DIAGNOSIS — N18.6 END STAGE RENAL DISEASE: ICD-10-CM

## 2023-06-05 LAB
ACETONE BLD-MCNC: NEGATIVE MG/DL
ALBUMIN SERPL BCP-MCNC: 4.2 G/DL (ref 3.5–5.2)
ALP SERPL-CCNC: 237 U/L (ref 55–135)
ALT SERPL W/O P-5'-P-CCNC: 32 U/L (ref 10–44)
ANION GAP SERPL CALC-SCNC: 22 MMOL/L (ref 8–16)
AST SERPL-CCNC: 30 U/L (ref 10–40)
BASOPHILS # BLD AUTO: 0.04 K/UL (ref 0–0.2)
BASOPHILS NFR BLD: 0.8 % (ref 0–1.9)
BILIRUB SERPL-MCNC: 1.9 MG/DL (ref 0.1–1)
BUN SERPL-MCNC: 37 MG/DL (ref 6–20)
CALCIUM SERPL-MCNC: 9.8 MG/DL (ref 8.7–10.5)
CHLORIDE SERPL-SCNC: 95 MMOL/L (ref 95–110)
CO2 SERPL-SCNC: 18 MMOL/L (ref 23–29)
CREAT SERPL-MCNC: 6 MG/DL (ref 0.5–1.4)
DIFFERENTIAL METHOD: ABNORMAL
EOSINOPHIL # BLD AUTO: 0.1 K/UL (ref 0–0.5)
EOSINOPHIL NFR BLD: 1.8 % (ref 0–8)
ERYTHROCYTE [DISTWIDTH] IN BLOOD BY AUTOMATED COUNT: 17 % (ref 11.5–14.5)
EST. GFR  (NO RACE VARIABLE): 8.8 ML/MIN/1.73 M^2
GLUCOSE SERPL-MCNC: 412 MG/DL (ref 70–110)
HCG SERPL QL: NEGATIVE
HCT VFR BLD AUTO: 29.3 % (ref 37–48.5)
HGB BLD-MCNC: 10.4 G/DL (ref 12–16)
IMM GRANULOCYTES # BLD AUTO: 0.02 K/UL (ref 0–0.04)
IMM GRANULOCYTES NFR BLD AUTO: 0.4 % (ref 0–0.5)
LACTATE SERPL-SCNC: 1.8 MMOL/L (ref 0.5–2.2)
LIPASE SERPL-CCNC: 17 U/L (ref 4–60)
LYMPHOCYTES # BLD AUTO: 0.6 K/UL (ref 1–4.8)
LYMPHOCYTES NFR BLD: 13.1 % (ref 18–48)
MCH RBC QN AUTO: 29.5 PG (ref 27–31)
MCHC RBC AUTO-ENTMCNC: 35.5 G/DL (ref 32–36)
MCV RBC AUTO: 83 FL (ref 82–98)
MONOCYTES # BLD AUTO: 0.2 K/UL (ref 0.3–1)
MONOCYTES NFR BLD: 4.9 % (ref 4–15)
NEUTROPHILS # BLD AUTO: 3.9 K/UL (ref 1.8–7.7)
NEUTROPHILS NFR BLD: 79 % (ref 38–73)
NRBC BLD-RTO: 0 /100 WBC
PLATELET # BLD AUTO: 94 K/UL (ref 150–450)
PMV BLD AUTO: 9.8 FL (ref 9.2–12.9)
POCT GLUCOSE: 236 MG/DL (ref 70–110)
POTASSIUM SERPL-SCNC: 4.5 MMOL/L (ref 3.5–5.1)
PROT SERPL-MCNC: 8.7 G/DL (ref 6–8.4)
RBC # BLD AUTO: 3.52 M/UL (ref 4–5.4)
SODIUM SERPL-SCNC: 135 MMOL/L (ref 136–145)
WBC # BLD AUTO: 4.89 K/UL (ref 3.9–12.7)

## 2023-06-05 PROCEDURE — 99284 EMERGENCY DEPT VISIT MOD MDM: CPT | Mod: 25

## 2023-06-05 PROCEDURE — 74018 RADEX ABDOMEN 1 VIEW: CPT | Mod: 26,,, | Performed by: RADIOLOGY

## 2023-06-05 PROCEDURE — 85025 COMPLETE CBC W/AUTO DIFF WBC: CPT | Performed by: EMERGENCY MEDICINE

## 2023-06-05 PROCEDURE — 82962 GLUCOSE BLOOD TEST: CPT

## 2023-06-05 PROCEDURE — 82009 KETONE BODYS QUAL: CPT | Performed by: EMERGENCY MEDICINE

## 2023-06-05 PROCEDURE — 63600175 PHARM REV CODE 636 W HCPCS: Performed by: EMERGENCY MEDICINE

## 2023-06-05 PROCEDURE — 80053 COMPREHEN METABOLIC PANEL: CPT | Performed by: EMERGENCY MEDICINE

## 2023-06-05 PROCEDURE — 74018 RADEX ABDOMEN 1 VIEW: CPT | Mod: TC

## 2023-06-05 PROCEDURE — 96372 THER/PROPH/DIAG INJ SC/IM: CPT | Mod: 59 | Performed by: EMERGENCY MEDICINE

## 2023-06-05 PROCEDURE — 96374 THER/PROPH/DIAG INJ IV PUSH: CPT

## 2023-06-05 PROCEDURE — 83690 ASSAY OF LIPASE: CPT | Performed by: EMERGENCY MEDICINE

## 2023-06-05 PROCEDURE — 83605 ASSAY OF LACTIC ACID: CPT | Performed by: EMERGENCY MEDICINE

## 2023-06-05 PROCEDURE — 96375 TX/PRO/DX INJ NEW DRUG ADDON: CPT

## 2023-06-05 PROCEDURE — 74018 XR ABDOMEN AP 1 VIEW: ICD-10-PCS | Mod: 26,,, | Performed by: RADIOLOGY

## 2023-06-05 PROCEDURE — 84703 CHORIONIC GONADOTROPIN ASSAY: CPT | Performed by: EMERGENCY MEDICINE

## 2023-06-05 RX ORDER — LORAZEPAM 2 MG/ML
1 INJECTION INTRAMUSCULAR
Status: COMPLETED | OUTPATIENT
Start: 2023-06-05 | End: 2023-06-05

## 2023-06-05 RX ORDER — PROMETHAZINE HYDROCHLORIDE 25 MG/1
25 SUPPOSITORY RECTAL EVERY 6 HOURS PRN
Qty: 10 SUPPOSITORY | Refills: 0 | Status: ON HOLD | OUTPATIENT
Start: 2023-06-05 | End: 2023-12-17 | Stop reason: HOSPADM

## 2023-06-05 RX ORDER — DIPHENHYDRAMINE HYDROCHLORIDE 50 MG/ML
50 INJECTION INTRAMUSCULAR; INTRAVENOUS
Status: COMPLETED | OUTPATIENT
Start: 2023-06-05 | End: 2023-06-05

## 2023-06-05 RX ORDER — METOCLOPRAMIDE HYDROCHLORIDE 5 MG/ML
10 INJECTION INTRAMUSCULAR; INTRAVENOUS
Status: COMPLETED | OUTPATIENT
Start: 2023-06-05 | End: 2023-06-05

## 2023-06-05 RX ORDER — KETOROLAC TROMETHAMINE 30 MG/ML
30 INJECTION, SOLUTION INTRAMUSCULAR; INTRAVENOUS
Status: COMPLETED | OUTPATIENT
Start: 2023-06-05 | End: 2023-06-05

## 2023-06-05 RX ORDER — PROMETHAZINE HYDROCHLORIDE 25 MG/ML
25 INJECTION, SOLUTION INTRAMUSCULAR; INTRAVENOUS
Status: COMPLETED | OUTPATIENT
Start: 2023-06-05 | End: 2023-06-05

## 2023-06-05 RX ADMIN — LORAZEPAM 1 MG: 2 INJECTION INTRAMUSCULAR; INTRAVENOUS at 11:06

## 2023-06-05 RX ADMIN — KETOROLAC TROMETHAMINE 30 MG: 30 INJECTION, SOLUTION INTRAMUSCULAR; INTRAVENOUS at 08:06

## 2023-06-05 RX ADMIN — PROMETHAZINE HYDROCHLORIDE 25 MG: 25 INJECTION INTRAMUSCULAR; INTRAVENOUS at 08:06

## 2023-06-05 RX ADMIN — INSULIN HUMAN 10 UNITS: 100 INJECTION, SOLUTION PARENTERAL at 11:06

## 2023-06-05 RX ADMIN — DIPHENHYDRAMINE HYDROCHLORIDE 50 MG: 50 INJECTION INTRAMUSCULAR; INTRAVENOUS at 09:06

## 2023-06-05 RX ADMIN — METOCLOPRAMIDE 10 MG: 5 INJECTION, SOLUTION INTRAMUSCULAR; INTRAVENOUS at 11:06

## 2023-06-05 NOTE — DISCHARGE SUMMARY
"[facility]  Discharge Summary      Patient Name: Tabby Howard  MRN: [id number]  UNIQUE: [id number]  Patient Class: OP- Observation  Admission Date: 6/3/2023  Hospital Length of Stay: 0 days  Discharge Date and Time: 6/4/2023  1:19 PM  Attending Physician: No att. providers found   Discharging Provider: Bryant Jeff MD  Primary Care Provider: AIDEN CONNER NP    Primary Care Team: Networked reference to record PCT     HPI:   33-year-old lady with prior history of ESRD on hemodialysis(T-Th-S),  gastroparesis, seizure, C diff infection, diabetes, poor vision presented to ER with abdominal pain, feeling overall poorly.  She admits missing last session of dialysis, as always she is not compliant with her medications or dialysis and is asking for IV Dilaudid.  This patient has extremely poor compliance with medications, dialysis and has numerous ER visits and hospital admissions mainly for similar complaints. She has at least 28 admissions in last year(not counting ER visits), 21 CT scans of abdomen/pelvis/chest in last 6 months(not counting other imaging like x-rays, ultrasounds), has drug-seeking behavior and her abdominal pain mysteriously gets better with IV Dilaudid only.  Upon reviewing  it appears that she routinely gets prescription of gabapentin and numerous prescriptions of opioids for short durations from different providers.  Today upon my evaluation she is not giving any consistent history however was able to tell me that she was hurting "all over" and ran out of her pain medications.  Also reports some nausea, constipation and fatigue otherwise denies any fever, chills, headache, dizziness, chest pain palpitations, bladder or bowel symptoms.     In ER patient was found to have hypervolemia due to missed dialysis, hyperglycemia blood sugar above 500s without anion gap.  She received IV insulin, antiemetics, IV Dilaudid, Ativan with dramatic improvement in her " symptoms.       * No surgery found *      Hospital Course:   Admitted for HD after presenting to the ED with N/V and severe abdominal pain that caused her to miss outpatient HD.  She received emergent HD and pain improved with IV Dilaudid in the ED.  Tolerating diet at this time.  Cleared for discharge to keep current outpatient HD schedule.  Examined on day of discharge and alert, NAD, comfortable respirations on room air.  Return precautions given.       Goals of Care Treatment Preferences:  Code Status: Full Code    Health care agent: Step-Mother Tanya Howard / Father Garcia Howard  Health care agent number: [phone] / [phone]          What is most important right now is to focus on remaining as independent as possible.  Accordingly, we have decided that the best plan to meet the patient's goals includes continuing with treatment.      Consults:   Consults (From admission, onward)        Status Ordering Provider     Inpatient consult to Nephrology  Once        Provider:  Mando Benitez MD    Completed ELIANA ROWLEY     Inpatient consult to Nephrology  Once        Provider:  Mando Benitez MD    Completed LUIS F SOSA          No new Assessment & Plan notes have been filed under this hospital service since the last note was generated.  Service: Hospital Medicine    Final Active Diagnoses:    Diagnosis Date Noted POA    PRINCIPAL PROBLEM:  Abdominal pain [R10.9] 06/03/2023 Yes    Mood disorder [F39] 04/10/2023 Yes    Frequent hospital admissions [Z78.9] 03/10/2023 Yes    Drug-seeking behavior [Z76.5] 01/24/2023 Yes    Uncontrolled hypertension [I10] 07/30/2022 Yes    ESRD (end stage renal disease) [N18.6] 07/22/2022 Yes    Sickle cell trait [D57.3] 04/12/2022 Yes     Chronic      Problems Resolved During this Admission:       Discharged Condition: good    Disposition: Home or Self Care    Follow Up:   Follow-up Information     AIDEN CONNER NP. Schedule an appointment as  "soon as possible for a visit in 2 week(s).    Specialty: Nurse Practitioner  Why: post hospital discharge follow up  Contact information:  [place] [address] [phone]             Please keep scheduled Hemodialysis Follow up.                     Patient Instructions:      Diet diabetic     Diet Cardiac     Diet renal     Notify your health care provider if you experience any of the following:  persistent nausea and vomiting or diarrhea     Notify your health care provider if you experience any of the following:  severe uncontrolled pain     Activity as tolerated       Significant Diagnostic Studies: Labs:   CMP   Recent Labs   Lab 06/03/23  1209 06/04/23  0449   * 135*   K 4.7 4.4   CL 97 98   CO2 26 27   * 391*   BUN 33* 18   CREATININE 5.5* 3.6*   CALCIUM 9.2 9.1   PROT 7.5 6.6   ALBUMIN 3.7 3.2*   BILITOT 1.8* 1.7*   ALKPHOS 191* 175*   AST 28 27   ALT 29 28   ANIONGAP 11 10    and CBC   Recent Labs   Lab 06/03/23  1209 06/04/23  0449   WBC 5.10 3.33*   HGB 9.2* 9.0*   HCT 27.4* 26.4*   PLT 74* 75*       Pending Diagnostic Studies:     None         Medications:  Reconciled Home Medications:      Medication List      CHANGE how you take these medications    insulin detemir U-100 (Levemir) 100 unit/mL (3 mL) Inpn pen  Inject 18 Units into the skin once daily.  What changed:   · how much to take  · when to take this        CONTINUE taking these medications    amLODIPine 5 MG tablet  Commonly known as: NORVASC  Take 2 tablets (10 mg total) by mouth once daily.     BD ULTRA-FINE MINI PEN NEEDLE 31 gauge x 3/16" Ndle  Generic drug: pen needle, diabetic  Use as directed to inject insulin 5 times daily     carvediloL 25 MG tablet  Commonly known as: COREG  Take 1 tablet (25 mg total) by mouth 2 (two) times daily.     dicyclomine 10 MG capsule  Commonly known as: BENTYL  Take 1 capsule (10 mg total) by mouth 4 (four) times daily as needed (abdominal pain).     ELIQUIS 5 mg Tab  Generic " drug: apixaban  Take 1 tablet (5 mg total) by mouth 2 (two) times daily.     FLUoxetine 20 MG capsule  Take 1 capsule (20 mg total) by mouth once daily.     gabapentin 100 MG capsule  Commonly known as: NEURONTIN  Take 1 capsule by mouth 3 times daily     hydrALAZINE 100 MG tablet  Commonly known as: APRESOLINE  Take 1 tablet (100 mg total) by mouth every 8 (eight) hours.     HYDROcodone-acetaminophen 7.5-325 mg per tablet  Commonly known as: NORCO  Take 1 tablet by mouth every 6 (six) hours as needed for Pain.     isosorbide mononitrate 30 MG 24 hr tablet  Commonly known as: IMDUR  Take 3 tablets (90 mg total) by mouth once daily.     levETIRAcetam 500 MG Tab  Commonly known as: KEPPRA  Take 1 tablet twice a day by oral route for 30 days.     NovoLOG FlexPen U-100 Insulin 100 unit/mL (3 mL) Inpn pen  Generic drug: insulin aspart U-100  Inject 4 Units into the skin 3 (three) times daily with meals.     pantoprazole 40 MG tablet  Commonly known as: PROTONIX  Take 1 tablet every day by oral route for 30 days.     sucralfate 100 mg/mL suspension  Commonly known as: CARAFATE  Take 10 mLs (1 g total) by mouth 4 (four) times daily before meals and nightly.            Indwelling Lines/Drains at time of discharge:   Lines/Drains/Airways     Central Venous Catheter Line  Duration                Hemodialysis Catheter 12/09/22 right subclavian 177 days                Time spent on the discharge of patient: 32 minutes         Bryant Jeff MD  Department of Hospital Medicine  [facility]

## 2023-06-05 NOTE — ED NOTES
Spoke to Pt's mother and she advised she was going to call the Pt's dad to come get her because she doesn't have transportation.

## 2023-06-05 NOTE — HOSPITAL COURSE
Admitted for HD after presenting to the ED with N/V and severe abdominal pain that caused her to miss outpatient HD.  She received emergent HD and pain improved with IV Dilaudid in the ED.  Tolerating diet at this time.  Cleared for discharge to keep current outpatient HD schedule.  Examined on day of discharge and alert, NAD, comfortable respirations on room air.  Return precautions given.

## 2023-06-05 NOTE — ED PROVIDER NOTES
Encounter Date: 2023       History     Chief Complaint   Patient presents with    Abdominal Pain     33-year-old female with multiple medical problems including end-stage renal disease (TTS) here with complaints of diffuse abdominal pain and nausea that began last few days.  Patient has chronic pain.    The history is provided by the patient and medical records.   Review of patient's allergies indicates:   Allergen Reactions    Penicillins Hives     Past Medical History:   Diagnosis Date    ESRD on hemodialysis 2022    Gastritis 2022    EGD was 21    Gastroparesis 2022    has not had Emptying study    Heart failure with preserved ejection fraction 2022    EF 55% on 3/22    History of supraventricular tachycardia     Hyperkalemia 2022    Hypertensive emergency 2022    Sickle cell trait 2022    Type 2 diabetes mellitus      Past Surgical History:   Procedure Laterality Date     SECTION      x 3    COLONOSCOPY      COLONOSCOPY N/A 2022    Procedure: COLONOSCOPY;  Surgeon: Jagdeep Cedeno MD;  Location: [facility];  Service: Endoscopy;  Laterality: N/A;    ESOPHAGOGASTRODUODENOSCOPY N/A 10/18/2019    Procedure: ESOPHAGOGASTRODUODENOSCOPY (EGD);  Surgeon: Gianluca Mendez MD;  Location: [facility];  Service: Endoscopy;  Laterality: N/A;    ESOPHAGOGASTRODUODENOSCOPY N/A 2022    Procedure: EGD (ESOPHAGOGASTRODUODENOSCOPY);  Surgeon: Micky Paredes III, MD;  Location: [facility];  Service: Endoscopy;  Laterality: N/A;    ESOPHAGOGASTRODUODENOSCOPY N/A 2022    Procedure: EGD (ESOPHAGOGASTRODUODENOSCOPY);  Surgeon: Marcelo Zhong MD;  Location: [facility];  Service: Endoscopy;  Laterality: N/A;    LAPAROSCOPIC CHOLECYSTECTOMY N/A 2022    Procedure: CHOLECYSTECTOMY, LAPAROSCOPIC;  Surgeon: Grey Perez MD;  Location: [facility] OR;  Service: General;  Laterality: N/A;    PLACEMENT OF DUAL-LUMEN VASCULAR CATHETER Left 2022    Procedure:  INSERTION-CATHETER-JOSEPH;  Surgeon: Dionte Gan MD;  Location: [facility] OR;  Service: General;  Laterality: Left;    PLACEMENT OF DUAL-LUMEN VASCULAR CATHETER Right 07/26/2022    Procedure: INSERTION-CATHETER-Hemosplit;  Surgeon: Dionte Gan MD;  Location: [facility] OR;  Service: General;  Laterality: Right;     Family History   Problem Relation Age of Onset    Diabetes Mother     Diabetes Father      Social History     Tobacco Use    Smoking status: Never    Smokeless tobacco: Never   Substance Use Topics    Alcohol use: Not Currently    Drug use: No     Review of Systems   Gastrointestinal:  Positive for abdominal pain, nausea and vomiting.   All other systems reviewed and are negative.    Physical Exam     Initial Vitals [06/05/23 0811]   BP Pulse Resp Temp SpO2   (!) 152/135 92 20 97.1 °F (36.2 °C) 97 %      MAP       --         Physical Exam    Nursing note and vitals reviewed.  Constitutional: She appears well-developed.   Thin, chronically ill-appearing young female writhing around on the bed constantly moaning.   HENT:   Head: Normocephalic and atraumatic.   Eyes: EOM are normal.   Neck: Neck supple.   Cardiovascular:  Normal rate.           Pulmonary/Chest: Breath sounds normal. No respiratory distress.   Abdominal: Abdomen is soft. There is abdominal tenderness.   Epigastric/ periumbilical TTP   Musculoskeletal:         General: No edema.      Cervical back: Neck supple.     Neurological: She is alert and oriented to person, place, and time.   Skin: Skin is warm and dry.   Psychiatric:   Constantly writhing and moaning.  Crying.       ED Course   Procedures  Labs Reviewed   CBC W/ AUTO DIFFERENTIAL - Abnormal; Notable for the following components:       Result Value    RBC 3.52 (*)     Hemoglobin 10.4 (*)     Hematocrit 29.3 (*)     RDW 17.0 (*)     Platelets 94 (*)     Lymph # 0.6 (*)     Mono # 0.2 (*)     Gran % 79.0 (*)     Lymph % 13.1 (*)     All other components within normal limits   COMPREHENSIVE  METABOLIC PANEL - Abnormal; Notable for the following components:    Sodium 135 (*)     CO2 18 (*)     Glucose 412 (*)     BUN 37 (*)     Creatinine 6.0 (*)     Total Protein 8.7 (*)     Total Bilirubin 1.9 (*)     Alkaline Phosphatase 237 (*)     Anion Gap 22 (*)     eGFR 8.8 (*)     All other components within normal limits   ACETONE   LIPASE   LACTIC ACID, PLASMA   PREGNANCY TEST SCREENING, SERUM          Imaging Results              X-Ray Abdomen AP 1 View (Final result)  Result time 06/05/23 09:26:02      Final result by Varun Trejo MD (06/05/23 09:26:02)                   Impression:      Large amount of stool throughout the colon without plain film evidence for bowel obstruction.      Electronically signed by: Varun Trejo  Date:    06/05/2023  Time:    09:26               Narrative:    EXAMINATION:  XR ABDOMEN AP 1 VIEW    CLINICAL HISTORY:  Pain, unspecified    TECHNIQUE:  Supine views of the abdomen.    COMPARISON:  06/03/2023.    FINDINGS:  Large amount of stool throughout the colon.  No plain film evidence for bowel obstruction.  No dilated loops of small bowel.    Prominent vascular calcifications of the left upper quadrant.  Surgical clips from previous cholecystectomy.    Bony pelvis is intact.                                       Medications   ketorolac injection 30 mg (30 mg Intramuscular Given 6/5/23 0824)   promethazine injection 25 mg (25 mg Intramuscular Given 6/5/23 0823)   diphenhydrAMINE injection 50 mg (50 mg Intravenous Given 6/5/23 0903)     Medical Decision Making:   Differential Diagnosis:   Peptic ulcer disease, gallbladder, pancreas, psychogenic  ED Management:  33-year-old female with multiple medical problems including chronic abdominal pain here with complaints of flare.  We will send labs, image and dispo accordingly.           ED Course as of 06/05/23 1009   Mon Jun 05, 2023   0949 I just walked into the room and constipation sticking her fingers down her throat to  induce vomiting.  Labs encouraging.  X-ray suggests constipation.  Patient will be discharged. [RJ]   1006 Discussed findings with patient.  She is still writing on the bed complaining of pain.  X-ray suggests constipation and narcotics would be the last thing she needs.  Patient is here 4 to 5 times a month.  Patient encouraged to follow up with her primary care physician. [RJ]      ED Course User Index  [RJ] Varun Johnson MD                 Clinical Impression:   Final diagnoses:  [R52] Pain  [R10.13] Epigastric abdominal pain (Primary)  [R11.2] Nausea and vomiting, unspecified vomiting type  [N18.6] End stage renal disease  [K31.84] Gastroparesis  [R11.10] Vomiting, unspecified vomiting type, unspecified whether nausea present        ED Disposition Condition    Discharge Stable          ED Prescriptions       Medication Sig Dispense Start Date End Date Auth. Provider    promethazine (PHENERGAN) 25 MG suppository Place 1 suppository (25 mg total) rectally every 6 (six) hours as needed for Nausea. 10 suppository 6/5/2023 -- Varun Johnson MD          Follow-up Information       Follow up With Specialties Details Why Contact Info    [facility] Emergency Dept Emergency Medicine  As needed [address] [address] [phone]    Your primary care physician  Schedule an appointment as soon as possible for a visit                Varun Johnson MD  06/07/23 2752

## 2023-06-05 NOTE — ED TRIAGE NOTES
Pt presents via [facility] with c/o severe generalized sharp abdominal pain with N/V since last night but states it became worse this morning.  Pt does hemodialysis Tu-Th-Sa and states her last dialysis was on Sat.

## 2023-06-13 RX ORDER — AMITRIPTYLINE HYDROCHLORIDE 50 MG/1
50 TABLET, FILM COATED ORAL NIGHTLY
Qty: 30 TABLET | Refills: 6 | Status: CANCELLED | OUTPATIENT
Start: 2023-06-13 | End: 2024-06-12

## 2023-06-16 ENCOUNTER — HOSPITAL ENCOUNTER (EMERGENCY)
Facility: HOSPITAL | Age: 33
Discharge: ANOTHER HOSPITAL | End: 2023-06-17
Attending: EMERGENCY MEDICINE
Payer: MEDICAID

## 2023-06-16 DIAGNOSIS — T82.9XXA COMPLICATION ASSOCIATED WITH DIALYSIS CATHETER: Primary | ICD-10-CM

## 2023-06-16 LAB
ALBUMIN SERPL BCP-MCNC: 3.2 G/DL (ref 3.5–5.2)
ALP SERPL-CCNC: 157 U/L (ref 55–135)
ALT SERPL W/O P-5'-P-CCNC: 19 U/L (ref 10–44)
ANION GAP SERPL CALC-SCNC: 13 MMOL/L (ref 8–16)
AST SERPL-CCNC: 28 U/L (ref 10–40)
BASOPHILS # BLD AUTO: 0.01 K/UL (ref 0–0.2)
BASOPHILS NFR BLD: 0.2 % (ref 0–1.9)
BILIRUB SERPL-MCNC: 0.7 MG/DL (ref 0.1–1)
BUN SERPL-MCNC: 25 MG/DL (ref 6–20)
CALCIUM SERPL-MCNC: 9.1 MG/DL (ref 8.7–10.5)
CHLORIDE SERPL-SCNC: 97 MMOL/L (ref 95–110)
CO2 SERPL-SCNC: 25 MMOL/L (ref 23–29)
CREAT SERPL-MCNC: 4.6 MG/DL (ref 0.5–1.4)
DIFFERENTIAL METHOD: ABNORMAL
EOSINOPHIL # BLD AUTO: 0.2 K/UL (ref 0–0.5)
EOSINOPHIL NFR BLD: 3.1 % (ref 0–8)
ERYTHROCYTE [DISTWIDTH] IN BLOOD BY AUTOMATED COUNT: 16 % (ref 11.5–14.5)
EST. GFR  (NO RACE VARIABLE): 12.1 ML/MIN/1.73 M^2
GLUCOSE SERPL-MCNC: 523 MG/DL (ref 70–110)
HCT VFR BLD AUTO: 29.1 % (ref 37–48.5)
HGB BLD-MCNC: 9.8 G/DL (ref 12–16)
IMM GRANULOCYTES # BLD AUTO: 0.05 K/UL (ref 0–0.04)
IMM GRANULOCYTES NFR BLD AUTO: 1 % (ref 0–0.5)
LYMPHOCYTES # BLD AUTO: 1 K/UL (ref 1–4.8)
LYMPHOCYTES NFR BLD: 18.7 % (ref 18–48)
MAGNESIUM SERPL-MCNC: 2 MG/DL (ref 1.6–2.6)
MCH RBC QN AUTO: 29.5 PG (ref 27–31)
MCHC RBC AUTO-ENTMCNC: 33.7 G/DL (ref 32–36)
MCV RBC AUTO: 88 FL (ref 82–98)
MONOCYTES # BLD AUTO: 0.6 K/UL (ref 0.3–1)
MONOCYTES NFR BLD: 11 % (ref 4–15)
NEUTROPHILS # BLD AUTO: 3.4 K/UL (ref 1.8–7.7)
NEUTROPHILS NFR BLD: 66 % (ref 38–73)
NRBC BLD-RTO: 0 /100 WBC
PHOSPHATE SERPL-MCNC: 4.1 MG/DL (ref 2.7–4.5)
PLATELET # BLD AUTO: 121 K/UL (ref 150–450)
PMV BLD AUTO: 9.7 FL (ref 9.2–12.9)
POCT GLUCOSE: 149 MG/DL (ref 70–110)
POCT GLUCOSE: 252 MG/DL (ref 70–110)
POTASSIUM SERPL-SCNC: 5 MMOL/L (ref 3.5–5.1)
PROT SERPL-MCNC: 7.3 G/DL (ref 6–8.4)
RBC # BLD AUTO: 3.32 M/UL (ref 4–5.4)
SODIUM SERPL-SCNC: 135 MMOL/L (ref 136–145)
WBC # BLD AUTO: 5.08 K/UL (ref 3.9–12.7)

## 2023-06-16 PROCEDURE — 99285 EMERGENCY DEPT VISIT HI MDM: CPT

## 2023-06-16 PROCEDURE — 96374 THER/PROPH/DIAG INJ IV PUSH: CPT

## 2023-06-16 PROCEDURE — 96375 TX/PRO/DX INJ NEW DRUG ADDON: CPT

## 2023-06-16 PROCEDURE — 85025 COMPLETE CBC W/AUTO DIFF WBC: CPT | Performed by: NURSE PRACTITIONER

## 2023-06-16 PROCEDURE — 63600175 PHARM REV CODE 636 W HCPCS: Performed by: EMERGENCY MEDICINE

## 2023-06-16 PROCEDURE — 82962 GLUCOSE BLOOD TEST: CPT

## 2023-06-16 PROCEDURE — 83735 ASSAY OF MAGNESIUM: CPT | Performed by: EMERGENCY MEDICINE

## 2023-06-16 PROCEDURE — 80053 COMPREHEN METABOLIC PANEL: CPT | Performed by: NURSE PRACTITIONER

## 2023-06-16 PROCEDURE — 84100 ASSAY OF PHOSPHORUS: CPT | Performed by: EMERGENCY MEDICINE

## 2023-06-16 RX ADMIN — INSULIN HUMAN 10 UNITS: 100 INJECTION, SOLUTION PARENTERAL at 09:06

## 2023-06-17 ENCOUNTER — HOSPITAL ENCOUNTER (OUTPATIENT)
Facility: HOSPITAL | Age: 33
LOS: 1 days | Discharge: HOME | End: 2023-06-17
Attending: HOSPITALIST | Admitting: HOSPITALIST
Payer: MEDICAID

## 2023-06-17 ENCOUNTER — ANESTHESIA (OUTPATIENT)
Dept: SURGERY | Facility: HOSPITAL | Age: 33
End: 2023-06-17
Payer: MEDICAID

## 2023-06-17 ENCOUNTER — ANESTHESIA EVENT (OUTPATIENT)
Dept: SURGERY | Facility: HOSPITAL | Age: 33
End: 2023-06-17
Payer: MEDICAID

## 2023-06-17 VITALS
DIASTOLIC BLOOD PRESSURE: 99 MMHG | SYSTOLIC BLOOD PRESSURE: 168 MMHG | BODY MASS INDEX: 21.35 KG/M2 | TEMPERATURE: 99 F | HEART RATE: 89 BPM | OXYGEN SATURATION: 99 % | WEIGHT: 116 LBS | HEIGHT: 62 IN | RESPIRATION RATE: 16 BRPM

## 2023-06-17 VITALS
HEART RATE: 95 BPM | BODY MASS INDEX: 23.32 KG/M2 | OXYGEN SATURATION: 95 % | TEMPERATURE: 98 F | DIASTOLIC BLOOD PRESSURE: 98 MMHG | WEIGHT: 126.75 LBS | RESPIRATION RATE: 18 BRPM | SYSTOLIC BLOOD PRESSURE: 145 MMHG | HEIGHT: 62 IN

## 2023-06-17 DIAGNOSIS — T82.42XA DISPLACEMENT OF VASCULAR DIALYSIS CATHETER: ICD-10-CM

## 2023-06-17 DIAGNOSIS — N18.6 ESRD (END STAGE RENAL DISEASE): ICD-10-CM

## 2023-06-17 DIAGNOSIS — T82.42XA DISPLACEMENT OF VASCULAR DIALYSIS CATHETER, INITIAL ENCOUNTER: Primary | ICD-10-CM

## 2023-06-17 DIAGNOSIS — R07.9 CHEST PAIN: ICD-10-CM

## 2023-06-17 PROBLEM — E11.649 SEVERE DIABETIC HYPOGLYCEMIA: Status: RESOLVED | Noted: 2023-03-02 | Resolved: 2023-06-17

## 2023-06-17 PROBLEM — G89.29 CHRONIC GENERALIZED ABDOMINAL PAIN: Status: ACTIVE | Noted: 2023-06-03

## 2023-06-17 PROBLEM — E87.5 HYPERKALEMIA: Status: RESOLVED | Noted: 2023-04-22 | Resolved: 2023-06-17

## 2023-06-17 PROBLEM — R10.84 CHRONIC GENERALIZED ABDOMINAL PAIN: Status: ACTIVE | Noted: 2023-06-03

## 2023-06-17 PROBLEM — R65.20 SEVERE SEPSIS: Status: RESOLVED | Noted: 2023-03-02 | Resolved: 2023-06-17

## 2023-06-17 PROBLEM — Z79.4 INSULIN DEPENDENT TYPE 2 DIABETES MELLITUS: Status: ACTIVE | Noted: 2023-06-17

## 2023-06-17 PROBLEM — E11.9 INSULIN DEPENDENT TYPE 2 DIABETES MELLITUS: Status: ACTIVE | Noted: 2023-06-17

## 2023-06-17 PROBLEM — A41.9 SEVERE SEPSIS: Status: RESOLVED | Noted: 2023-03-02 | Resolved: 2023-06-17

## 2023-06-17 PROBLEM — I10 HYPERTENSION: Status: ACTIVE | Noted: 2023-06-17

## 2023-06-17 PROBLEM — K56.1 INTUSSUSCEPTION INTESTINE: Status: RESOLVED | Noted: 2023-03-02 | Resolved: 2023-06-17

## 2023-06-17 LAB
B-HCG UR QL: NEGATIVE
HBV SURFACE AG SERPL QL IA: NORMAL
POCT GLUCOSE: 159 MG/DL (ref 70–110)
POCT GLUCOSE: 259 MG/DL (ref 70–110)
POCT GLUCOSE: 270 MG/DL (ref 70–110)

## 2023-06-17 PROCEDURE — 71000033 HC RECOVERY, INTIAL HOUR: Performed by: SURGERY

## 2023-06-17 PROCEDURE — 77001 CHG FLUOROGUIDE CNTRL VEN ACCESS,PLACE,REPLACE,REMOVE: ICD-10-PCS | Mod: 26,,, | Performed by: SURGERY

## 2023-06-17 PROCEDURE — G0378 HOSPITAL OBSERVATION PER HR: HCPCS

## 2023-06-17 PROCEDURE — 37000009 HC ANESTHESIA EA ADD 15 MINS: Performed by: SURGERY

## 2023-06-17 PROCEDURE — 63600175 PHARM REV CODE 636 W HCPCS: Performed by: NURSE ANESTHETIST, CERTIFIED REGISTERED

## 2023-06-17 PROCEDURE — D9220A PRA ANESTHESIA: Mod: CRNA,,, | Performed by: NURSE ANESTHETIST, CERTIFIED REGISTERED

## 2023-06-17 PROCEDURE — 00532 ANES ACCESS CTR VENOUS CRCJ: CPT | Performed by: SURGERY

## 2023-06-17 PROCEDURE — 77001 FLUOROGUIDE FOR VEIN DEVICE: CPT | Mod: 26,,, | Performed by: SURGERY

## 2023-06-17 PROCEDURE — 25000003 PHARM REV CODE 250: Performed by: ANESTHESIOLOGY

## 2023-06-17 PROCEDURE — 71000039 HC RECOVERY, EACH ADD'L HOUR: Performed by: SURGERY

## 2023-06-17 PROCEDURE — 25000003 PHARM REV CODE 250: Performed by: NURSE ANESTHETIST, CERTIFIED REGISTERED

## 2023-06-17 PROCEDURE — D9220A PRA ANESTHESIA: ICD-10-PCS | Mod: ANES,,, | Performed by: ANESTHESIOLOGY

## 2023-06-17 PROCEDURE — 63600175 PHARM REV CODE 636 W HCPCS: Performed by: NURSE PRACTITIONER

## 2023-06-17 PROCEDURE — D9220A PRA ANESTHESIA: Mod: ANES,,, | Performed by: ANESTHESIOLOGY

## 2023-06-17 PROCEDURE — 36415 COLL VENOUS BLD VENIPUNCTURE: CPT | Performed by: INTERNAL MEDICINE

## 2023-06-17 PROCEDURE — D9220A PRA ANESTHESIA: ICD-10-PCS | Mod: CRNA,,, | Performed by: NURSE ANESTHETIST, CERTIFIED REGISTERED

## 2023-06-17 PROCEDURE — 81025 URINE PREGNANCY TEST: CPT | Performed by: HOSPITALIST

## 2023-06-17 PROCEDURE — 25000003 PHARM REV CODE 250: Performed by: INTERNAL MEDICINE

## 2023-06-17 PROCEDURE — 63600175 PHARM REV CODE 636 W HCPCS: Performed by: SURGERY

## 2023-06-17 PROCEDURE — 80100014 HC HEMODIALYSIS 1:1

## 2023-06-17 PROCEDURE — G0257 UNSCHED DIALYSIS ESRD PT HOS: HCPCS

## 2023-06-17 PROCEDURE — 96372 THER/PROPH/DIAG INJ SC/IM: CPT | Performed by: NURSE PRACTITIONER

## 2023-06-17 PROCEDURE — 99214 PR OFFICE/OUTPT VISIT, EST, LEVL IV, 30-39 MIN: ICD-10-PCS | Mod: 25,ICN,, | Performed by: SURGERY

## 2023-06-17 PROCEDURE — 87340 HEPATITIS B SURFACE AG IA: CPT | Performed by: INTERNAL MEDICINE

## 2023-06-17 PROCEDURE — C1750 CATH, HEMODIALYSIS,LONG-TERM: HCPCS | Performed by: SURGERY

## 2023-06-17 PROCEDURE — 25000003 PHARM REV CODE 250: Performed by: NURSE PRACTITIONER

## 2023-06-17 PROCEDURE — G0379 DIRECT REFER HOSPITAL OBSERV: HCPCS

## 2023-06-17 PROCEDURE — 36000707: Performed by: SURGERY

## 2023-06-17 PROCEDURE — 37000008 HC ANESTHESIA 1ST 15 MINUTES: Performed by: SURGERY

## 2023-06-17 PROCEDURE — 82962 GLUCOSE BLOOD TEST: CPT

## 2023-06-17 PROCEDURE — 99214 OFFICE O/P EST MOD 30 MIN: CPT | Mod: 25,ICN,, | Performed by: SURGERY

## 2023-06-17 PROCEDURE — 63600175 PHARM REV CODE 636 W HCPCS: Performed by: ANESTHESIOLOGY

## 2023-06-17 PROCEDURE — 25000003 PHARM REV CODE 250: Performed by: SURGERY

## 2023-06-17 PROCEDURE — 36578 PR REPLACE CV CATH, CATHETER ONLY W SUBQ PUMP OR PORT: ICD-10-PCS | Mod: RT,,, | Performed by: SURGERY

## 2023-06-17 PROCEDURE — 36578 REPLACE TUNNELED CV CATH: CPT | Mod: RT,,, | Performed by: SURGERY

## 2023-06-17 PROCEDURE — 36000706: Performed by: SURGERY

## 2023-06-17 DEVICE — HEMOSPLIT HEMODIALYSIS CATH, ST, AIRGUARD, 14.5 FR. 19CM
Type: IMPLANTABLE DEVICE | Site: CHEST | Status: FUNCTIONAL
Brand: HEMOSPLIT LONG-TERM HEMODIALYSIS CATHETER

## 2023-06-17 RX ORDER — TALC
6 POWDER (GRAM) TOPICAL NIGHTLY PRN
Status: DISCONTINUED | OUTPATIENT
Start: 2023-06-17 | End: 2023-06-17 | Stop reason: HOSPADM

## 2023-06-17 RX ORDER — HEPARIN 100 UNIT/ML
5 SYRINGE INTRAVENOUS ONCE
Status: DISCONTINUED | OUTPATIENT
Start: 2023-06-17 | End: 2023-06-17 | Stop reason: HOSPADM

## 2023-06-17 RX ORDER — ISOSORBIDE MONONITRATE 30 MG/1
30 TABLET, EXTENDED RELEASE ORAL DAILY
Status: DISCONTINUED | OUTPATIENT
Start: 2023-06-17 | End: 2023-06-17 | Stop reason: HOSPADM

## 2023-06-17 RX ORDER — HEPARIN SODIUM 1000 [USP'U]/ML
INJECTION, SOLUTION INTRAVENOUS; SUBCUTANEOUS
Status: DISCONTINUED | OUTPATIENT
Start: 2023-06-17 | End: 2023-06-17 | Stop reason: HOSPADM

## 2023-06-17 RX ORDER — FENTANYL CITRATE 50 UG/ML
25 INJECTION, SOLUTION INTRAMUSCULAR; INTRAVENOUS EVERY 5 MIN PRN
Status: DISCONTINUED | OUTPATIENT
Start: 2023-06-17 | End: 2023-06-17

## 2023-06-17 RX ORDER — CLINDAMYCIN PHOSPHATE 900 MG/50ML
INJECTION, SOLUTION INTRAVENOUS
Status: DISCONTINUED | OUTPATIENT
Start: 2023-06-17 | End: 2023-06-17

## 2023-06-17 RX ORDER — GABAPENTIN 100 MG/1
100 CAPSULE ORAL 3 TIMES DAILY
Status: DISCONTINUED | OUTPATIENT
Start: 2023-06-17 | End: 2023-06-17 | Stop reason: HOSPADM

## 2023-06-17 RX ORDER — IBUPROFEN 200 MG
16 TABLET ORAL
Status: DISCONTINUED | OUTPATIENT
Start: 2023-06-17 | End: 2023-06-17 | Stop reason: HOSPADM

## 2023-06-17 RX ORDER — INSULIN ASPART 100 [IU]/ML
1-10 INJECTION, SOLUTION INTRAVENOUS; SUBCUTANEOUS
Status: DISCONTINUED | OUTPATIENT
Start: 2023-06-17 | End: 2023-06-17 | Stop reason: HOSPADM

## 2023-06-17 RX ORDER — FLUOXETINE HYDROCHLORIDE 20 MG/1
20 CAPSULE ORAL DAILY
Status: DISCONTINUED | OUTPATIENT
Start: 2023-06-17 | End: 2023-06-17 | Stop reason: HOSPADM

## 2023-06-17 RX ORDER — AMOXICILLIN 250 MG
1 CAPSULE ORAL 2 TIMES DAILY
Status: DISCONTINUED | OUTPATIENT
Start: 2023-06-17 | End: 2023-06-17 | Stop reason: HOSPADM

## 2023-06-17 RX ORDER — DICYCLOMINE HYDROCHLORIDE 10 MG/1
10 CAPSULE ORAL 4 TIMES DAILY PRN
Status: DISCONTINUED | OUTPATIENT
Start: 2023-06-17 | End: 2023-06-17 | Stop reason: HOSPADM

## 2023-06-17 RX ORDER — BUPIVACAINE HYDROCHLORIDE 2.5 MG/ML
INJECTION, SOLUTION EPIDURAL; INFILTRATION; INTRACAUDAL
Status: DISCONTINUED | OUTPATIENT
Start: 2023-06-17 | End: 2023-06-17 | Stop reason: HOSPADM

## 2023-06-17 RX ORDER — POLYETHYLENE GLYCOL 3350 17 G/17G
17 POWDER, FOR SOLUTION ORAL 2 TIMES DAILY PRN
Status: DISCONTINUED | OUTPATIENT
Start: 2023-06-17 | End: 2023-06-17 | Stop reason: HOSPADM

## 2023-06-17 RX ORDER — AMLODIPINE BESYLATE 5 MG/1
5 TABLET ORAL DAILY
Status: DISCONTINUED | OUTPATIENT
Start: 2023-06-17 | End: 2023-06-17 | Stop reason: HOSPADM

## 2023-06-17 RX ORDER — IBUPROFEN 200 MG
24 TABLET ORAL
Status: DISCONTINUED | OUTPATIENT
Start: 2023-06-17 | End: 2023-06-17 | Stop reason: HOSPADM

## 2023-06-17 RX ORDER — HYDROCODONE BITARTRATE AND ACETAMINOPHEN 7.5; 325 MG/1; MG/1
1 TABLET ORAL EVERY 6 HOURS PRN
Status: DISCONTINUED | OUTPATIENT
Start: 2023-06-17 | End: 2023-06-17 | Stop reason: HOSPADM

## 2023-06-17 RX ORDER — CARVEDILOL 25 MG/1
25 TABLET ORAL 2 TIMES DAILY
Status: DISCONTINUED | OUTPATIENT
Start: 2023-06-17 | End: 2023-06-17 | Stop reason: HOSPADM

## 2023-06-17 RX ORDER — ACETAMINOPHEN 10 MG/ML
INJECTION, SOLUTION INTRAVENOUS
Status: DISCONTINUED | OUTPATIENT
Start: 2023-06-17 | End: 2023-06-17

## 2023-06-17 RX ORDER — SUCRALFATE 1 G/10ML
1 SUSPENSION ORAL 4 TIMES DAILY
Status: DISCONTINUED | OUTPATIENT
Start: 2023-06-17 | End: 2023-06-17 | Stop reason: HOSPADM

## 2023-06-17 RX ORDER — ONDANSETRON 2 MG/ML
INJECTION INTRAMUSCULAR; INTRAVENOUS
Status: DISCONTINUED | OUTPATIENT
Start: 2023-06-17 | End: 2023-06-17

## 2023-06-17 RX ORDER — MUPIROCIN 20 MG/G
OINTMENT TOPICAL 2 TIMES DAILY
Status: DISCONTINUED | OUTPATIENT
Start: 2023-06-17 | End: 2023-06-17 | Stop reason: HOSPADM

## 2023-06-17 RX ORDER — LEVETIRACETAM 250 MG/1
500 TABLET ORAL 2 TIMES DAILY
Status: DISCONTINUED | OUTPATIENT
Start: 2023-06-17 | End: 2023-06-17 | Stop reason: HOSPADM

## 2023-06-17 RX ORDER — MIDAZOLAM HYDROCHLORIDE 1 MG/ML
INJECTION INTRAMUSCULAR; INTRAVENOUS
Status: DISCONTINUED | OUTPATIENT
Start: 2023-06-17 | End: 2023-06-17

## 2023-06-17 RX ORDER — SODIUM CHLORIDE 0.9 % (FLUSH) 0.9 %
10 SYRINGE (ML) INJECTION EVERY 12 HOURS PRN
Status: DISCONTINUED | OUTPATIENT
Start: 2023-06-17 | End: 2023-06-17 | Stop reason: HOSPADM

## 2023-06-17 RX ORDER — HYDRALAZINE HYDROCHLORIDE 25 MG/1
100 TABLET, FILM COATED ORAL EVERY 8 HOURS
Status: DISCONTINUED | OUTPATIENT
Start: 2023-06-17 | End: 2023-06-17 | Stop reason: HOSPADM

## 2023-06-17 RX ORDER — NALOXONE HCL 0.4 MG/ML
0.02 VIAL (ML) INJECTION
Status: DISCONTINUED | OUTPATIENT
Start: 2023-06-17 | End: 2023-06-17 | Stop reason: HOSPADM

## 2023-06-17 RX ORDER — PROMETHAZINE HYDROCHLORIDE 25 MG/1
25 SUPPOSITORY RECTAL EVERY 6 HOURS PRN
Status: DISCONTINUED | OUTPATIENT
Start: 2023-06-17 | End: 2023-06-17 | Stop reason: HOSPADM

## 2023-06-17 RX ORDER — ONDANSETRON 2 MG/ML
4 INJECTION INTRAMUSCULAR; INTRAVENOUS EVERY 6 HOURS PRN
Status: DISCONTINUED | OUTPATIENT
Start: 2023-06-17 | End: 2023-06-17 | Stop reason: HOSPADM

## 2023-06-17 RX ORDER — PROPOFOL 10 MG/ML
VIAL (ML) INTRAVENOUS
Status: DISCONTINUED | OUTPATIENT
Start: 2023-06-17 | End: 2023-06-17

## 2023-06-17 RX ORDER — LIDOCAINE HYDROCHLORIDE 20 MG/ML
INJECTION INTRAVENOUS
Status: DISCONTINUED | OUTPATIENT
Start: 2023-06-17 | End: 2023-06-17

## 2023-06-17 RX ORDER — PANTOPRAZOLE SODIUM 40 MG/1
40 TABLET, DELAYED RELEASE ORAL DAILY
Status: DISCONTINUED | OUTPATIENT
Start: 2023-06-17 | End: 2023-06-17 | Stop reason: HOSPADM

## 2023-06-17 RX ORDER — SODIUM CHLORIDE 9 MG/ML
INJECTION, SOLUTION INTRAVENOUS ONCE
Status: COMPLETED | OUTPATIENT
Start: 2023-06-17 | End: 2023-06-17

## 2023-06-17 RX ORDER — METOCLOPRAMIDE HYDROCHLORIDE 5 MG/ML
10 INJECTION INTRAMUSCULAR; INTRAVENOUS EVERY 10 MIN PRN
Status: COMPLETED | OUTPATIENT
Start: 2023-06-17 | End: 2023-06-17

## 2023-06-17 RX ORDER — ACETAMINOPHEN 325 MG/1
650 TABLET ORAL EVERY 8 HOURS PRN
Status: DISCONTINUED | OUTPATIENT
Start: 2023-06-17 | End: 2023-06-17 | Stop reason: HOSPADM

## 2023-06-17 RX ORDER — FENTANYL CITRATE 50 UG/ML
INJECTION, SOLUTION INTRAMUSCULAR; INTRAVENOUS
Status: DISCONTINUED | OUTPATIENT
Start: 2023-06-17 | End: 2023-06-17

## 2023-06-17 RX ORDER — OXYCODONE HYDROCHLORIDE 5 MG/1
5 TABLET ORAL ONCE AS NEEDED
Status: COMPLETED | OUTPATIENT
Start: 2023-06-17 | End: 2023-06-17

## 2023-06-17 RX ORDER — CEFAZOLIN SODIUM 2 G/50ML
2 SOLUTION INTRAVENOUS
Status: DISCONTINUED | OUTPATIENT
Start: 2023-06-17 | End: 2023-06-17 | Stop reason: HOSPADM

## 2023-06-17 RX ORDER — LIDOCAINE HYDROCHLORIDE AND EPINEPHRINE 10; 10 MG/ML; UG/ML
INJECTION, SOLUTION INFILTRATION; PERINEURAL
Status: DISCONTINUED | OUTPATIENT
Start: 2023-06-17 | End: 2023-06-17 | Stop reason: HOSPADM

## 2023-06-17 RX ORDER — GLUCAGON 1 MG
1 KIT INJECTION
Status: DISCONTINUED | OUTPATIENT
Start: 2023-06-17 | End: 2023-06-17 | Stop reason: HOSPADM

## 2023-06-17 RX ORDER — MAG HYDROX/ALUMINUM HYD/SIMETH 200-200-20
30 SUSPENSION, ORAL (FINAL DOSE FORM) ORAL 4 TIMES DAILY PRN
Status: DISCONTINUED | OUTPATIENT
Start: 2023-06-17 | End: 2023-06-17 | Stop reason: HOSPADM

## 2023-06-17 RX ADMIN — SUCRALFATE 1 G: 1 SUSPENSION ORAL at 05:06

## 2023-06-17 RX ADMIN — MIDAZOLAM HYDROCHLORIDE 2 MG: 1 INJECTION INTRAMUSCULAR; INTRAVENOUS at 10:06

## 2023-06-17 RX ADMIN — PROPOFOL 10 MG: 10 INJECTION, EMULSION INTRAVENOUS at 11:06

## 2023-06-17 RX ADMIN — MIDAZOLAM HYDROCHLORIDE 1 MG: 1 INJECTION INTRAMUSCULAR; INTRAVENOUS at 11:06

## 2023-06-17 RX ADMIN — HYDRALAZINE HYDROCHLORIDE 100 MG: 25 TABLET, FILM COATED ORAL at 02:06

## 2023-06-17 RX ADMIN — SUCRALFATE 1 G: 1 SUSPENSION ORAL at 02:06

## 2023-06-17 RX ADMIN — SODIUM CHLORIDE: 0.9 INJECTION, SOLUTION INTRAVENOUS at 10:06

## 2023-06-17 RX ADMIN — METOCLOPRAMIDE 10 MG: 5 INJECTION, SOLUTION INTRAMUSCULAR; INTRAVENOUS at 12:06

## 2023-06-17 RX ADMIN — GABAPENTIN 100 MG: 100 CAPSULE ORAL at 02:06

## 2023-06-17 RX ADMIN — PROPOFOL 20 MG: 10 INJECTION, EMULSION INTRAVENOUS at 11:06

## 2023-06-17 RX ADMIN — HYDROCODONE BITARTRATE AND ACETAMINOPHEN 1 TABLET: 7.5; 325 TABLET ORAL at 07:06

## 2023-06-17 RX ADMIN — OXYCODONE HYDROCHLORIDE 5 MG: 5 TABLET ORAL at 12:06

## 2023-06-17 RX ADMIN — MIDAZOLAM HYDROCHLORIDE 1 MG: 1 INJECTION INTRAMUSCULAR; INTRAVENOUS at 10:06

## 2023-06-17 RX ADMIN — CLINDAMYCIN PHOSPHATE 900 MG: 18 INJECTION, SOLUTION INTRAVENOUS at 11:06

## 2023-06-17 RX ADMIN — ONDANSETRON 8 MG: 2 INJECTION INTRAMUSCULAR; INTRAVENOUS at 11:06

## 2023-06-17 RX ADMIN — INSULIN ASPART 6 UNITS: 100 INJECTION, SOLUTION INTRAVENOUS; SUBCUTANEOUS at 08:06

## 2023-06-17 RX ADMIN — INSULIN ASPART 6 UNITS: 100 INJECTION, SOLUTION INTRAVENOUS; SUBCUTANEOUS at 05:06

## 2023-06-17 RX ADMIN — HYDROCODONE BITARTRATE AND ACETAMINOPHEN 1 TABLET: 7.5; 325 TABLET ORAL at 06:06

## 2023-06-17 RX ADMIN — ACETAMINOPHEN 650 MG: 10 INJECTION, SOLUTION INTRAVENOUS at 11:06

## 2023-06-17 RX ADMIN — LIDOCAINE HYDROCHLORIDE 50 MG: 20 INJECTION, SOLUTION INTRAVENOUS at 10:06

## 2023-06-17 RX ADMIN — FENTANYL CITRATE 25 MCG: 50 INJECTION, SOLUTION INTRAMUSCULAR; INTRAVENOUS at 11:06

## 2023-06-17 NOTE — SUBJECTIVE & OBJECTIVE
Past Medical History:   Diagnosis Date    ESRD on hemodialysis 2022    Gastritis 2022    EGD was 22    Gastroparesis 2022    has not had Emptying study    Heart failure with preserved ejection fraction 2022    EF 55% on 3/22    History of supraventricular tachycardia     Hyperkalemia 2022    Hypertensive emergency 2022    Sickle cell trait 2022    Type 2 diabetes mellitus        Past Surgical History:   Procedure Laterality Date     SECTION      x 3    COLONOSCOPY      COLONOSCOPY N/A 2022    Procedure: COLONOSCOPY;  Surgeon: Jagdeep Cedeno MD;  Location: [facility];  Service: Endoscopy;  Laterality: N/A;    ESOPHAGOGASTRODUODENOSCOPY N/A 10/18/2019    Procedure: ESOPHAGOGASTRODUODENOSCOPY (EGD);  Surgeon: Gianluca Mendez MD;  Location: [facility];  Service: Endoscopy;  Laterality: N/A;    ESOPHAGOGASTRODUODENOSCOPY N/A 2022    Procedure: EGD (ESOPHAGOGASTRODUODENOSCOPY);  Surgeon: Micky Paredes III, MD;  Location: [facility];  Service: Endoscopy;  Laterality: N/A;    ESOPHAGOGASTRODUODENOSCOPY N/A 2022    Procedure: EGD (ESOPHAGOGASTRODUODENOSCOPY);  Surgeon: Marcelo Zhong MD;  Location: [facility];  Service: Endoscopy;  Laterality: N/A;    LAPAROSCOPIC CHOLECYSTECTOMY N/A 2022    Procedure: CHOLECYSTECTOMY, LAPAROSCOPIC;  Surgeon: Grey Perez MD;  Location: [facility];  Service: General;  Laterality: N/A;    PLACEMENT OF DUAL-LUMEN VASCULAR CATHETER Left 2022    Procedure: INSERTION-CATHETER-JOSEPH;  Surgeon: Dionte Gan MD;  Location: [facility] OR;  Service: General;  Laterality: Left;    PLACEMENT OF DUAL-LUMEN VASCULAR CATHETER Right 2022    Procedure: INSERTION-CATHETER-Hemosplit;  Surgeon: Dionte Gan MD;  Location: [facility] OR;  Service: General;  Laterality: Right;       Review of patient's allergies indicates:   Allergen Reactions    Penicillins Hives       Current Facility-Administered Medications on File Prior  to Encounter   Medication    [COMPLETED] insulin regular injection 10 Units 0.1 mL     Current Outpatient Medications on File Prior to Encounter   Medication Sig    amLODIPine (NORVASC) 5 MG tablet Take 1 tablet (5 mg total) by mouth once daily.    apixaban (ELIQUIS) 5 mg Tab Take 1 tablet (5 mg total) by mouth 2 (two) times daily.    apixaban (ELIQUIS) 5 mg Tab Take 1 tablet (5 mg total) by mouth 2 (two) times daily.    carvediloL (COREG) 25 MG tablet Take 1 tablet (25 mg total) by mouth 2 (two) times daily.    [] dicyclomine (BENTYL) 10 MG capsule Take 1 capsule (10 mg total) by mouth 4 (four) times daily as needed (abdominal pain).    FLUoxetine 20 MG capsule Take 1 capsule (20 mg total) by mouth once daily.    gabapentin (NEURONTIN) 100 MG capsule Take 1 capsule by mouth 3 times daily (Patient taking differently: Take 100 mg by mouth 3 (three) times daily.)    gabapentin (NEURONTIN) 300 MG capsule Take 2 capsules (600 mg total) by mouth 2 (two) times a day.    hydrALAZINE (APRESOLINE) 100 MG tablet Take 1 tablet (100 mg total) by mouth every 8 (eight) hours.    hydrALAZINE (APRESOLINE) 100 MG tablet Take 1 tablet (100 mg total) by mouth 2 (two) times a day.    HYDROcodone-acetaminophen (NORCO) 7.5-325 mg per tablet Take 1 tablet by mouth every 6 (six) hours as needed for Pain.    insulin aspart U-100 (NOVOLOG FLEXPEN U-100 INSULIN) 100 unit/mL (3 mL) InPn pen Inject 5 Units into the skin 3 (three) times daily before meals.    insulin aspart U-100 (NOVOLOG) 100 unit/mL (3 mL) InPn pen Inject 4 Units into the skin 3 (three) times daily with meals.    insulin detemir U-100, Levemir, (LEVEMIR FLEXPEN) 100 unit/mL (3 mL) InPn pen Inject 18 Units into the skin every evening.    insulin detemir U-100, Levemir, 100 unit/mL (3 mL) SubQ InPn pen Inject 18 Units into the skin once daily.    isosorbide mononitrate (IMDUR) 30 MG 24 hr tablet Take 3 tablets (90 mg total) by mouth once daily.    isosorbide mononitrate  "(IMDUR) 30 MG 24 hr tablet Take 1 tablet (30 mg total) by mouth once daily.    lancets 33 gauge Misc Use to test twice daily    levETIRAcetam (KEPPRA) 500 MG Tab Take 1 tablet twice a day by oral route for 30 days. (Patient taking differently: Take 500 mg by mouth 2 (two) times daily.)    ondansetron (ZOFRAN-ODT) 4 MG TbDL Place 1 TABLET (4 mg) ON OR UNDER THE TONGUE every 8 hours    pantoprazole (PROTONIX) 40 MG tablet Take 1 tablet every day by oral route for 30 days. (Patient taking differently: Take 40 mg by mouth once daily.)    pantoprazole (PROTONIX) 40 MG tablet Take 1 tablet every day by oral route for 30 days.    pen needle, diabetic 31 gauge x 3/16" Ndle Use as directed to inject insulin 5 times daily    promethazine (PHENERGAN) 25 MG suppository Place 1 suppository (25 mg total) rectally every 6 (six) hours as needed for Nausea.    sucralfate (CARAFATE) 100 mg/mL suspension Take 10 mLs (1,000 mg total) by mouth 4 (four) times daily.    [DISCONTINUED] atenoloL (TENORMIN) 50 MG tablet Take 1 tablet (50 mg total) by mouth every other day.    [DISCONTINUED] omeprazole (PRILOSEC) 20 MG capsule Take 2 capsules (40 mg total) by mouth once daily. for 10 days     Family History       Problem Relation (Age of Onset)    Diabetes Mother, Father          Tobacco Use    Smoking status: Never    Smokeless tobacco: Never   Substance and Sexual Activity    Alcohol use: Not Currently    Drug use: No    Sexual activity: Not Currently     Partners: Male     Birth control/protection: I.U.D.     Review of Systems   Constitutional:  Negative for activity change, appetite change, chills, diaphoresis, fatigue, fever and unexpected weight change.   HENT:  Negative for congestion, ear pain, facial swelling, hearing loss, sore throat and trouble swallowing.    Eyes:  Negative for pain and discharge.   Respiratory:  Negative for cough, chest tightness, shortness of breath and wheezing.    Cardiovascular:  Negative for chest pain, " palpitations and leg swelling.   Gastrointestinal:  Positive for abdominal pain. Negative for blood in stool, diarrhea, nausea and vomiting.   Endocrine: Negative for polydipsia, polyphagia and polyuria.   Genitourinary:  Negative for difficulty urinating, dysuria, flank pain, frequency and urgency.   Musculoskeletal:  Negative for arthralgias, back pain, joint swelling, neck pain and neck stiffness.   Skin:  Negative for rash and wound.   Allergic/Immunologic: Negative for environmental allergies and immunocompromised state.   Neurological:  Negative for dizziness, seizures, syncope, speech difficulty, weakness, light-headedness, numbness and headaches.   Hematological:  Negative for adenopathy.   Psychiatric/Behavioral:  Negative for sleep disturbance and suicidal ideas. The patient is not nervous/anxious.    All other systems reviewed and are negative.  Objective:     Vital Signs (Most Recent):  Temp: 97.9 °F (36.6 °C) (06/17/23 0615)  Pulse: 92 (06/17/23 0615)  Resp: 18 (06/17/23 0656)  BP: (!) 176/95 (06/17/23 0615)  SpO2: 97 % (06/17/23 0615) Vital Signs (24h Range):  Temp:  [97.9 °F (36.6 °C)-98.5 °F (36.9 °C)] 97.9 °F (36.6 °C)  Pulse:  [88-93] 92  Resp:  [16-20] 18  SpO2:  [96 %-99 %] 97 %  BP: (143-176)/(90-99) 176/95     Weight: 57.5 kg (126 lb 12.2 oz)  Body mass index is 23.19 kg/m².     Physical Exam  Vitals and nursing note reviewed.   Constitutional:       Comments: Chronically ill appearing   HENT:      Head: Normocephalic and atraumatic.      Nose: Nose normal.      Mouth/Throat:      Mouth: Mucous membranes are moist.      Pharynx: Oropharynx is clear.   Eyes:      Extraocular Movements: Extraocular movements intact.      Pupils: Pupils are equal, round, and reactive to light.   Cardiovascular:      Rate and Rhythm: Normal rate and regular rhythm.      Pulses: Normal pulses.   Pulmonary:      Effort: Pulmonary effort is normal.      Breath sounds: Normal breath sounds.   Abdominal:      General:  Bowel sounds are normal.      Palpations: Abdomen is soft.   Musculoskeletal:         General: Normal range of motion.      Cervical back: Normal range of motion and neck supple.   Skin:     General: Skin is warm and dry.      Capillary Refill: Capillary refill takes less than 2 seconds.      Comments: Bandage to rt upper chest wall, no significant bleeding   Neurological:      General: No focal deficit present.      Mental Status: She is alert and oriented to person, place, and time.   Psychiatric:      Comments: Pleasant to speak with, cyclical squirming motion in bed. Asking for pain medication            CRANIAL NERVES     CN III, IV, VI   Pupils are equal, round, and reactive to light.     Significant Labs: All pertinent labs within the past 24 hours have been reviewed.  CBC:   Recent Labs   Lab 06/16/23 2041   WBC 5.08   HGB 9.8*   HCT 29.1*   *     CMP:   Recent Labs   Lab 06/16/23 2041   *   K 5.0   CL 97   CO2 25   *   BUN 25*   CREATININE 4.6*   CALCIUM 9.1   PROT 7.3   ALBUMIN 3.2*   BILITOT 0.7   ALKPHOS 157*   AST 28   ALT 19   ANIONGAP 13

## 2023-06-17 NOTE — ED NOTES
Report called to SHAILA Helton. Pt to be transferred to [facility], 3rd floor, Rm 315. Phone # [phone].

## 2023-06-17 NOTE — TRANSFER OF CARE
"Anesthesia Transfer of Care Note    Patient: Tabby Howard    Procedure(s) Performed: Procedure(s) (LRB):  Insertion,catheter,tunneled (N/A)    Patient location: PACU    Anesthesia Type: MAC    Transport from OR: Transported from OR on 2-3 L/min O2 by NC with adequate spontaneous ventilation    Post pain: adequate analgesia    Post assessment: no apparent anesthetic complications    Post vital signs: stable    Level of consciousness: awake    Complications: none    Transfer of care protocol was followed      Last vitals:   Visit Vitals  BP (!) 152/88 (BP Location: Left arm, Patient Position: Lying)   Pulse 90   Temp 36.8 °C (98.2 °F) (Oral)   Resp 20   Ht 5' 2" (1.575 m)   Wt 57.5 kg (126 lb 12.2 oz)   SpO2 95%   Breastfeeding No   BMI 23.19 kg/m²     "

## 2023-06-17 NOTE — PLAN OF CARE
[facility]  Initial Discharge Assessment       Primary Care Provider: AIDEN CONNER NP    Admission Diagnosis: Displacement of vascular dialysis catheter, initial encounter [T82.42XA]    Admission Date: 6/17/2023  Expected Discharge Date: 6/17/2023    Transition of Care Barriers: None    Payor: MEDICAID / Plan: HEALTH[place] BLUE [place] / Product Type: Managed Medicaid /     Extended Emergency Contact Information  Primary Emergency Contact: Tanya Howard  Address: [address] [place] [address], [place] [address] [facility]  Home Phone: [phone]  Mobile Phone: [phone]  Relation: Step parent  Preferred language: English   needed? No  Secondary Emergency Contact: Garcia Howard  Address: [address] [place], [place] [address]W[place] [address] [place] [address], [place] [address]  Mobile Phone: [phone]  Relation: Father  Preferred language: English   needed? No    Discharge Plan A: Home with family  Discharge Plan B: Home      [facility]  [address]  [facility] [address]  Phone: [phone] Fax: [phone]      Completed DC assessment with pt at bedside. Verified information on facesheet as correct. Lives at listed address with parents.  Verified PCP as [facility]- was last there about a week ago. Pharm is [facility] (closed on weekend so [facility] [address] On eliquis. Denies hh/outpt services. Does dialysis at [facility] 11 AM. Reports that her father provides transportation to dialysis and other apts and will also provide transportation home upon DC. Verified insurance on file. DME- glucometer and RW. Reports using RW all the time and requires assistance with bathing. DC plan is home.     Initial Assessment (most recent)       Adult Discharge Assessment - 06/17/23 1249          Discharge Assessment    Assessment Type Discharge Planning Assessment     Confirmed/corrected address, phone number and insurance Yes      Confirmed Demographics Correct on Facesheet     Source of Information patient     Does patient/caregiver understand observation status Yes     Communicated TATIANA with patient/caregiver Yes     People in Home child(tammy), dependent;parent(s)     Facility Arrived From: ER     Do you expect to return to your current living situation? Yes     Do you have help at home or someone to help you manage your care at home? Yes     Prior to hospitilization cognitive status: Alert/Oriented     Current cognitive status: Alert/Oriented     Walking or Climbing Stairs ambulation difficulty, requires equipment     Equipment Currently Used at Home walker, rolling;glucometer     Readmission within 30 days? No     Patient currently being followed by outpatient case management? No     Do you currently have service(s) that help you manage your care at home? No     Do you take prescription medications? Yes     Do you have prescription coverage? Yes     Do you have any problems affording any of your prescribed medications? No     Is the patient taking medications as prescribed? yes     Who is going to help you get home at discharge? mom     How do you get to doctors appointments? family or friend will provide     Are you on dialysis? Yes     Dialysis Name and Scheduled days [facility]     Do you take coumadin? No     Discharge Plan A Home with family     Discharge Plan B Home     DME Needed Upon Discharge  none     Discharge Plan discussed with: Patient     Transition of Care Barriers None

## 2023-06-17 NOTE — HPI
"Ms. Howard is a 33-year-old female with history of ESRD on hemodialysis(T-Th-S),  gastroparesis, seizures, C diff infection, IDDM, DVT Left Upper ext on eliquis, poor vision presented to [facility] with complaints of accidentally pulling out her HD catheter. She states she was in the bath and noticed it hanging and then pulled it out. She was subsequently transferred to [facility] with plans for replacement by Dr. Thurman this morning and then she will need HD. She denies excessive bleeding, fever, chest pain, SOB. She does report generalized abd pain. She has chronic abd pain and is in her hospital bed squirming and asking for pain medication.     Documentation as per previous admission H&P 6/3/23:  "This patient has extremely poor compliance with medications, dialysis and has numerous ER visits and hospital admissions mainly for similar complaints. She has at least 28 admissions in last year(not counting ER visits), 21 CT scans of abdomen/pelvis/chest in last 6 months(not counting other imaging like x-rays, ultrasounds), has drug-seeking behavior and her abdominal pain mysteriously gets better with IV Dilaudid only.  Upon reviewing  it appears that she routinely gets prescription of gabapentin and numerous prescriptions of opioids for short durations from different providers."      "

## 2023-06-17 NOTE — ED NOTES
Attempted to call 3rd phone number provided by [facility] for insurance approval for prior auth and only given option to call from 7pm to 7am and no further option to speak to . Notified [facility] per phone call.

## 2023-06-17 NOTE — H&P
"[facility] Medicine  History & Physical    Patient Name: Tabby Howard  MRN: [id number]  Patient Class: OP- Observation  Admission Date: 6/17/2023  Attending Physician: Dr. Cueto  Primary Care Provider: AIDEN CONNER NP         Patient information was obtained from patient, past medical records and ER records.     Subjective:     Principal Problem:Displacement of vascular dialysis catheter    Chief Complaint: No chief complaint on file.       HPI: Ms. Howard is a 33-year-old female with history of ESRD on hemodialysis(T-Th-S),  gastroparesis, seizures, C diff infection, IDDM, DVT Left Upper ext on eliquis, poor vision presented to [facility] with complaints of accidentally pulling out her HD catheter. She states she was in the bath and noticed it hanging and then pulled it out. She was subsequently transferred to [facility] with plans for replacement by Dr. Thurman this morning and then she will need HD. She denies excessive bleeding, fever, chest pain, SOB. She does report generalized abd pain. She has chronic abd pain and is in her hospital bed squirming and asking for pain medication.     Documentation as per previous admission H&P 6/3/23:  "This patient has extremely poor compliance with medications, dialysis and has numerous ER visits and hospital admissions mainly for similar complaints. She has at least 28 admissions in last year(not counting ER visits), 21 CT scans of abdomen/pelvis/chest in last 6 months(not counting other imaging like x-rays, ultrasounds), has drug-seeking behavior and her abdominal pain mysteriously gets better with IV Dilaudid only.  Upon reviewing  it appears that she routinely gets prescription of gabapentin and numerous prescriptions of opioids for short durations from different providers."          Past Medical History:   Diagnosis Date    ESRD on hemodialysis 04/12/2022    Gastritis 12/2022    EGD was 12/5/22    Gastroparesis 04/12/2022 "    has not had Emptying study    Heart failure with preserved ejection fraction 2022    EF 55% on 3/22    History of supraventricular tachycardia     Hyperkalemia 2022    Hypertensive emergency 2022    Sickle cell trait 2022    Type 2 diabetes mellitus        Past Surgical History:   Procedure Laterality Date     SECTION      x 3    COLONOSCOPY      COLONOSCOPY N/A 2022    Procedure: COLONOSCOPY;  Surgeon: Jagdeep Cedeno MD;  Location: [facility];  Service: Endoscopy;  Laterality: N/A;    ESOPHAGOGASTRODUODENOSCOPY N/A 10/18/2019    Procedure: ESOPHAGOGASTRODUODENOSCOPY (EGD);  Surgeon: Gianluca Mendez MD;  Location: [facility];  Service: Endoscopy;  Laterality: N/A;    ESOPHAGOGASTRODUODENOSCOPY N/A 2022    Procedure: EGD (ESOPHAGOGASTRODUODENOSCOPY);  Surgeon: Micky Paredes III, MD;  Location: [facility];  Service: Endoscopy;  Laterality: N/A;    ESOPHAGOGASTRODUODENOSCOPY N/A 2022    Procedure: EGD (ESOPHAGOGASTRODUODENOSCOPY);  Surgeon: Marcelo Zhong MD;  Location: [facility];  Service: Endoscopy;  Laterality: N/A;    LAPAROSCOPIC CHOLECYSTECTOMY N/A 2022    Procedure: CHOLECYSTECTOMY, LAPAROSCOPIC;  Surgeon: Grey Perez MD;  [facility];  Service: General;  Laterality: N/A;    PLACEMENT OF DUAL-LUMEN VASCULAR CATHETER Left 2022    Procedure: INSERTION-CATHETER-JOSEPH;  Surgeon: Dionte Gan MD;  Location: [facility];  Service: General;  Laterality: Left;    PLACEMENT OF DUAL-LUMEN VASCULAR CATHETER Right 2022    Procedure: INSERTION-CATHETER-Hemosplit;  Surgeon: Dionte Gan MD;  Location: [facility];  Service: General;  Laterality: Right;       Review of patient's allergies indicates:   Allergen Reactions    Penicillins Hives       Current Facility-Administered Medications on File Prior to Encounter   Medication    [COMPLETED] insulin regular injection 10 Units 0.1 mL     Current Outpatient Medications on File  Prior to Encounter   Medication Sig    amLODIPine (NORVASC) 5 MG tablet Take 1 tablet (5 mg total) by mouth once daily.    apixaban (ELIQUIS) 5 mg Tab Take 1 tablet (5 mg total) by mouth 2 (two) times daily.    apixaban (ELIQUIS) 5 mg Tab Take 1 tablet (5 mg total) by mouth 2 (two) times daily.    carvediloL (COREG) 25 MG tablet Take 1 tablet (25 mg total) by mouth 2 (two) times daily.    [] dicyclomine (BENTYL) 10 MG capsule Take 1 capsule (10 mg total) by mouth 4 (four) times daily as needed (abdominal pain).    FLUoxetine 20 MG capsule Take 1 capsule (20 mg total) by mouth once daily.    gabapentin (NEURONTIN) 100 MG capsule Take 1 capsule by mouth 3 times daily (Patient taking differently: Take 100 mg by mouth 3 (three) times daily.)    gabapentin (NEURONTIN) 300 MG capsule Take 2 capsules (600 mg total) by mouth 2 (two) times a day.    hydrALAZINE (APRESOLINE) 100 MG tablet Take 1 tablet (100 mg total) by mouth every 8 (eight) hours.    hydrALAZINE (APRESOLINE) 100 MG tablet Take 1 tablet (100 mg total) by mouth 2 (two) times a day.    HYDROcodone-acetaminophen (NORCO) 7.5-325 mg per tablet Take 1 tablet by mouth every 6 (six) hours as needed for Pain.    insulin aspart U-100 (NOVOLOG FLEXPEN U-100 INSULIN) 100 unit/mL (3 mL) InPn pen Inject 5 Units into the skin 3 (three) times daily before meals.    insulin aspart U-100 (NOVOLOG) 100 unit/mL (3 mL) InPn pen Inject 4 Units into the skin 3 (three) times daily with meals.    insulin detemir U-100, Levemir, (LEVEMIR FLEXPEN) 100 unit/mL (3 mL) InPn pen Inject 18 Units into the skin every evening.    insulin detemir U-100, Levemir, 100 unit/mL (3 mL) SubQ InPn pen Inject 18 Units into the skin once daily.    isosorbide mononitrate (IMDUR) 30 MG 24 hr tablet Take 3 tablets (90 mg total) by mouth once daily.    isosorbide mononitrate (IMDUR) 30 MG 24 hr tablet Take 1 tablet (30 mg total) by mouth once daily.    lancets 33 gauge Misc Use to  "test twice daily    levETIRAcetam (KEPPRA) 500 MG Tab Take 1 tablet twice a day by oral route for 30 days. (Patient taking differently: Take 500 mg by mouth 2 (two) times daily.)    ondansetron (ZOFRAN-ODT) 4 MG TbDL Place 1 TABLET (4 mg) ON OR UNDER THE TONGUE every 8 hours    pantoprazole (PROTONIX) 40 MG tablet Take 1 tablet every day by oral route for 30 days. (Patient taking differently: Take 40 mg by mouth once daily.)    pantoprazole (PROTONIX) 40 MG tablet Take 1 tablet every day by oral route for 30 days.    pen needle, diabetic 31 gauge x 3/16" Ndle Use as directed to inject insulin 5 times daily    promethazine (PHENERGAN) 25 MG suppository Place 1 suppository (25 mg total) rectally every 6 (six) hours as needed for Nausea.    sucralfate (CARAFATE) 100 mg/mL suspension Take 10 mLs (1,000 mg total) by mouth 4 (four) times daily.    [DISCONTINUED] atenoloL (TENORMIN) 50 MG tablet Take 1 tablet (50 mg total) by mouth every other day.    [DISCONTINUED] omeprazole (PRILOSEC) 20 MG capsule Take 2 capsules (40 mg total) by mouth once daily. for 10 days     Family History       Problem Relation (Age of Onset)    Diabetes Mother, Father          Tobacco Use    Smoking status: Never    Smokeless tobacco: Never   Substance and Sexual Activity    Alcohol use: Not Currently    Drug use: No    Sexual activity: Not Currently     Partners: Male     Birth control/protection: I.U.D.     Review of Systems   Constitutional:  Negative for activity change, appetite change, chills, diaphoresis, fatigue, fever and unexpected weight change.   HENT:  Negative for congestion, ear pain, facial swelling, hearing loss, sore throat and trouble swallowing.    Eyes:  Negative for pain and discharge.   Respiratory:  Negative for cough, chest tightness, shortness of breath and wheezing.    Cardiovascular:  Negative for chest pain, palpitations and leg swelling.   Gastrointestinal:  Positive for abdominal pain. Negative for " blood in stool, diarrhea, nausea and vomiting.   Endocrine: Negative for polydipsia, polyphagia and polyuria.   Genitourinary:  Negative for difficulty urinating, dysuria, flank pain, frequency and urgency.   Musculoskeletal:  Negative for arthralgias, back pain, joint swelling, neck pain and neck stiffness.   Skin:  Negative for rash and wound.   Allergic/Immunologic: Negative for environmental allergies and immunocompromised state.   Neurological:  Negative for dizziness, seizures, syncope, speech difficulty, weakness, light-headedness, numbness and headaches.   Hematological:  Negative for adenopathy.   Psychiatric/Behavioral:  Negative for sleep disturbance and suicidal ideas. The patient is not nervous/anxious.    All other systems reviewed and are negative.  Objective:     Vital Signs (Most Recent):  Temp: 97.9 °F (36.6 °C) (06/17/23 0615)  Pulse: 92 (06/17/23 0615)  Resp: 18 (06/17/23 0656)  BP: (!) 176/95 (06/17/23 0615)  SpO2: 97 % (06/17/23 0615) Vital Signs (24h Range):  Temp:  [97.9 °F (36.6 °C)-98.5 °F (36.9 °C)] 97.9 °F (36.6 °C)  Pulse:  [88-93] 92  Resp:  [16-20] 18  SpO2:  [96 %-99 %] 97 %  BP: (143-176)/(90-99) 176/95     Weight: 57.5 kg (126 lb 12.2 oz)  Body mass index is 23.19 kg/m².     Physical Exam  Vitals and nursing note reviewed.   Constitutional:       Comments: Chronically ill appearing   HENT:      Head: Normocephalic and atraumatic.      Nose: Nose normal.      Mouth/Throat:      Mouth: Mucous membranes are moist.      Pharynx: Oropharynx is clear.   Eyes:      Extraocular Movements: Extraocular movements intact.      Pupils: Pupils are equal, round, and reactive to light.   Cardiovascular:      Rate and Rhythm: Normal rate and regular rhythm.      Pulses: Normal pulses.   Pulmonary:      Effort: Pulmonary effort is normal.      Breath sounds: Normal breath sounds.   Abdominal:      General: Bowel sounds are normal.      Palpations: Abdomen is soft.   Musculoskeletal:         General:  Normal range of motion.      Cervical back: Normal range of motion and neck supple.   Skin:     General: Skin is warm and dry.      Capillary Refill: Capillary refill takes less than 2 seconds.      Comments: Bandage to rt upper chest wall, no significant bleeding   Neurological:      General: No focal deficit present.      Mental Status: She is alert and oriented to person, place, and time.   Psychiatric:      Comments: Pleasant to speak with, cyclical squirming motion in bed. Asking for pain medication            CRANIAL NERVES     CN III, IV, VI   Pupils are equal, round, and reactive to light.     Significant Labs: All pertinent labs within the past 24 hours have been reviewed.  CBC:   Recent Labs   Lab 06/16/23 2041   WBC 5.08   HGB 9.8*   HCT 29.1*   *     CMP:   Recent Labs   Lab 06/16/23 2041   *   K 5.0   CL 97   CO2 25   *   BUN 25*   CREATININE 4.6*   CALCIUM 9.1   PROT 7.3   ALBUMIN 3.2*   BILITOT 0.7   ALKPHOS 157*   AST 28   ALT 19   ANIONGAP 13           Assessment/Plan:     * Displacement of vascular dialysis catheter  Admit to med/surg  Consult Dr. Thurman for replacement of HD catheter  Will need HD after catheter is placed  NPO for procedure  Holding eliquis for procedure, resume when clinically indicated      Insulin dependent type 2 diabetes mellitus  Patient's FSGs are controlled on current medication regimen.  Last A1c reviewed-   Lab Results   Component Value Date    HGBA1C 5.8 03/03/2023     Most recent fingerstick glucose reviewed-   Recent Labs   Lab 06/16/23  2226 06/16/23  2340 06/17/23  0043   POCTGLUCOSE 252* 149* 159*     Current correctional scale  Low  Maintain anti-hyperglycemic dose as follows-   Antihyperglycemics (From admission, onward)    Start     Stop Route Frequency Ordered    06/17/23 2100  insulin detemir U-100 (Levemir) pen 18 Units         -- SubQ Nightly 06/17/23 0623    06/17/23 0723  insulin aspart U-100 pen 1-10 Units         -- SubQ Before  meals & nightly PRN 06/17/23 0623        Hold Oral hypoglycemics while patient is in the hospital.    Hypertension  Continue appropriate home antihypertensives    Chronic generalized abdominal pain  Continue home pain medications and bentyl  Also on carafate and protonix - continue         Chronic deep vein thrombosis (DVT) of left upper extremity  Holding eliquis for line placement, resume when clinically indicated      ESRD (end stage renal disease)  Consult nephrology for HD management      Gastroparesis - suspected, unconfirmed  Likely cause of abd pain        VTE Risk Mitigation (From admission, onward)         Ordered     IP VTE HIGH RISK PATIENT  Once         06/17/23 0623     Place sequential compression device  Until discontinued         06/17/23 0623                         Esperanza Guy NP  Department of Hospital Medicine  [facility]

## 2023-06-17 NOTE — CONSULTS
"Nephrology Consult Note        Patient Name: Tabby Howard  MRN: [id number]    Patient Class: OP- Observation   Admission Date: 2023  Length of Stay: 1 days  Date of Service: 2023    Attending Physician: Linda Cueto MD  Primary Care Provider: AIDEN CONNER NP    Reason for Consult: esrd    SUBJECTIVE:     HPI: 33F with ESRD on HD by Dr. Figueroa, uncontrolled DM, chronic abdominal pain presents to ER because her dialysis catheter "fell out" while she was taking a bath. Bleeding is well controlled. She will have it replaced today and will need HD via new line before she can go home. Her BG are also elevated.    Past Medical History:   Diagnosis Date    ESRD on hemodialysis 2022    Gastritis 2022    EGD was 22    Gastroparesis 2022    has not had Emptying study    Heart failure with preserved ejection fraction 2022    EF 55% on 3/22    History of supraventricular tachycardia     Hyperkalemia 2022    Hypertensive emergency 2022    Sickle cell trait 2022    Type 2 diabetes mellitus      Past Surgical History:   Procedure Laterality Date     SECTION      x 3    COLONOSCOPY      COLONOSCOPY N/A 2022    Procedure: COLONOSCOPY;  Surgeon: Jagdeep Cedeno MD;  Location: [facility];  Service: Endoscopy;  Laterality: N/A;    ESOPHAGOGASTRODUODENOSCOPY N/A 10/18/2019    Procedure: ESOPHAGOGASTRODUODENOSCOPY (EGD);  Surgeon: Gianluca Mendez MD;  Location: [facility];  Service: Endoscopy;  Laterality: N/A;    ESOPHAGOGASTRODUODENOSCOPY N/A 2022    Procedure: EGD (ESOPHAGOGASTRODUODENOSCOPY);  Surgeon: Micky Paredes III, MD;  Location: [facility];  Service: Endoscopy;  Laterality: N/A;    ESOPHAGOGASTRODUODENOSCOPY N/A 2022    Procedure: EGD (ESOPHAGOGASTRODUODENOSCOPY);  Surgeon: Marcelo Zhong MD;  Location: [facility];  Service: Endoscopy;  Laterality: N/A;    LAPAROSCOPIC CHOLECYSTECTOMY N/A 2022    Procedure: CHOLECYSTECTOMY, " LAPAROSCOPIC;  Surgeon: Grey Perez MD;  Location: [facility];  Service: General;  Laterality: N/A;    PLACEMENT OF DUAL-LUMEN VASCULAR CATHETER Left 2022    Procedure: INSERTION-CATHETER-JOSEPH;  Surgeon: Dionte Gan MD;  Location: [facility] OR;  Service: General;  Laterality: Left;    PLACEMENT OF DUAL-LUMEN VASCULAR CATHETER Right 2022    Procedure: INSERTION-CATHETER-Hemosplit;  Surgeon: Dionte Gan MD;  Location: [facility] OR;  Service: General;  Laterality: Right;     Family History   Problem Relation Age of Onset    Diabetes Mother     Diabetes Father      Social History     Tobacco Use    Smoking status: Never    Smokeless tobacco: Never   Substance Use Topics    Alcohol use: Not Currently    Drug use: No       Review of patient's allergies indicates:   Allergen Reactions    Penicillins Hives       Outpatient meds:  Current Facility-Administered Medications on File Prior to Encounter   Medication Dose Route Frequency Provider Last Rate Last Admin    [COMPLETED] insulin regular injection 10 Units 0.1 mL  10 Units Intravenous ED 1 Time Ad Berman MD   10 Units at 23     Current Outpatient Medications on File Prior to Encounter   Medication Sig Dispense Refill    amLODIPine (NORVASC) 5 MG tablet Take 1 tablet (5 mg total) by mouth once daily. 30 tablet 1    apixaban (ELIQUIS) 5 mg Tab Take 1 tablet (5 mg total) by mouth 2 (two) times daily. 60 tablet 2    apixaban (ELIQUIS) 5 mg Tab Take 1 tablet (5 mg total) by mouth 2 (two) times daily. 60 tablet 2    carvediloL (COREG) 25 MG tablet Take 1 tablet (25 mg total) by mouth 2 (two) times daily. 60 tablet 5    [] dicyclomine (BENTYL) 10 MG capsule Take 1 capsule (10 mg total) by mouth 4 (four) times daily as needed (abdominal pain). 20 capsule 0    FLUoxetine 20 MG capsule Take 1 capsule (20 mg total) by mouth once daily. 30 capsule 5    gabapentin (NEURONTIN) 100 MG capsule Take 1 capsule by mouth 3 times daily (Patient taking  differently: Take 100 mg by mouth 3 (three) times daily.) 90 capsule 2    gabapentin (NEURONTIN) 300 MG capsule Take 2 capsules (600 mg total) by mouth 2 (two) times a day. 120 capsule 1    hydrALAZINE (APRESOLINE) 100 MG tablet Take 1 tablet (100 mg total) by mouth every 8 (eight) hours. 60 tablet 0    hydrALAZINE (APRESOLINE) 100 MG tablet Take 1 tablet (100 mg total) by mouth 2 (two) times a day. 60 tablet 2    HYDROcodone-acetaminophen (NORCO) 7.5-325 mg per tablet Take 1 tablet by mouth every 6 (six) hours as needed for Pain. 30 tablet 0    insulin aspart U-100 (NOVOLOG FLEXPEN U-100 INSULIN) 100 unit/mL (3 mL) InPn pen Inject 5 Units into the skin 3 (three) times daily before meals. 15 mL 2    insulin aspart U-100 (NOVOLOG) 100 unit/mL (3 mL) InPn pen Inject 4 Units into the skin 3 (three) times daily with meals. 15 mL 3    insulin detemir U-100, Levemir, (LEVEMIR FLEXPEN) 100 unit/mL (3 mL) InPn pen Inject 18 Units into the skin every evening. 9 mL 5    insulin detemir U-100, Levemir, 100 unit/mL (3 mL) SubQ InPn pen Inject 18 Units into the skin once daily. 15 mL 3    isosorbide mononitrate (IMDUR) 30 MG 24 hr tablet Take 3 tablets (90 mg total) by mouth once daily. 90 tablet 11    isosorbide mononitrate (IMDUR) 30 MG 24 hr tablet Take 1 tablet (30 mg total) by mouth once daily. 30 tablet 1    lancets 33 gauge Misc Use to test twice daily 100 each 5    levETIRAcetam (KEPPRA) 500 MG Tab Take 1 tablet twice a day by oral route for 30 days. (Patient taking differently: Take 500 mg by mouth 2 (two) times daily.) 60 tablet 2    ondansetron (ZOFRAN-ODT) 4 MG TbDL Place 1 TABLET (4 mg) ON OR UNDER THE TONGUE every 8 hours 24 tablet 0    pantoprazole (PROTONIX) 40 MG tablet Take 1 tablet every day by oral route for 30 days. (Patient taking differently: Take 40 mg by mouth once daily.) 30 tablet 2    pantoprazole (PROTONIX) 40 MG tablet Take 1 tablet every day by oral route for 30 days. 30 tablet 2    pen needle,  "diabetic 31 gauge x 3/16" Ndle Use as directed to inject insulin 5 times daily 100 each 11    promethazine (PHENERGAN) 25 MG suppository Place 1 suppository (25 mg total) rectally every 6 (six) hours as needed for Nausea. 10 suppository 0    sucralfate (CARAFATE) 100 mg/mL suspension Take 10 mLs (1,000 mg total) by mouth 4 (four) times daily. 1200 mL 1    [DISCONTINUED] atenoloL (TENORMIN) 50 MG tablet Take 1 tablet (50 mg total) by mouth every other day. 45 tablet 3    [DISCONTINUED] omeprazole (PRILOSEC) 20 MG capsule Take 2 capsules (40 mg total) by mouth once daily. for 10 days 20 capsule 0       Scheduled meds:   amLODIPine  5 mg Oral Daily    carvediloL  25 mg Oral BID    FLUoxetine  20 mg Oral Daily    gabapentin  100 mg Oral TID    hydrALAZINE  100 mg Oral Q8H    insulin detemir U-100 (Levemir)  18 Units Subcutaneous QHS    isosorbide mononitrate  30 mg Oral Daily    levETIRAcetam  500 mg Oral BID    pantoprazole  40 mg Oral Daily    senna-docusate 8.6-50 mg  1 tablet Oral BID    sucralfate  1 g Oral QID       Infusions:      PRN meds:  acetaminophen, aluminum-magnesium hydroxide-simethicone, dextrose 10%, dextrose 10%, dicyclomine, glucagon (human recombinant), glucose, glucose, HYDROcodone-acetaminophen, insulin aspart U-100, melatonin, naloxone, ondansetron, polyethylene glycol, promethazine, sodium chloride 0.9%    Review of Systems:  Constitutional:  Negative for chills, fever, malaise/fatigue and weight loss.   HENT:  Negative for hearing loss and nosebleeds.    Eyes:  Negative for blurred vision, double vision and photophobia.   Respiratory:  Negative for cough, shortness of breath and wheezing.    Cardiovascular:  Negative for chest pain, palpitations and leg swelling.   Gastrointestinal:  Negative for abdominal pain, constipation, diarrhea, heartburn, nausea and vomiting.   Genitourinary:  Negative for dysuria, frequency and urgency.   Musculoskeletal:  Negative for falls, joint pain and myalgias. "   Skin:  Negative for itching and rash.   Neurological:  Negative for dizziness, speech change, focal weakness, loss of consciousness and headaches.   Endo/Heme/Allergies:  Does not bruise/bleed easily.   Psychiatric/Behavioral:  Negative for depression and substance abuse. The patient is not nervous/anxious.      OBJECTIVE:     Vital Signs and IO:  Temp:  [97.9 °F (36.6 °C)-98.5 °F (36.9 °C)]   Pulse:  [88-93]   Resp:  [16-20]   BP: (143-176)/(87-99)   SpO2:  [86 %-99 %]   No intake/output data recorded.  Wt Readings from Last 5 Encounters:   06/17/23 57.5 kg (126 lb 12.2 oz)   06/16/23 52.6 kg (116 lb)   06/05/23 54 kg (119 lb)   06/04/23 49.8 kg (109 lb 12.6 oz)   05/16/23 52.7 kg (116 lb 2.9 oz)     Body mass index is 23.19 kg/m².    Physical Exam  Constitutional:       General: Patient is not in acute distress.     Appearance: Patient is well-developed. She is not diaphoretic.   HENT:      Head: Normocephalic and atraumatic.      Mouth/Throat: Mucous membranes are moist.   Eyes:      General: No scleral icterus.     Pupils: Pupils are equal, round, and reactive to light.   Cardiovascular:      Rate and Rhythm: Normal rate and regular rhythm.   Pulmonary:      Effort: Pulmonary effort is normal. No respiratory distress.      Breath sounds: No stridor.   Abdominal:      General: There is no distension.      Palpations: Abdomen is soft.   Musculoskeletal:         General: No deformity. Normal range of motion.      Cervical back: Neck supple.   Skin:     General: Skin is warm and dry.      Findings: No rash present. No erythema.   Neurological:      Mental Status: Patient is alert and oriented to person, place, and time.      Cranial Nerves: No cranial nerve deficit.   Psychiatric:         Behavior: Behavior normal.     Laboratory:  Recent Labs   Lab 06/16/23 2041   *   K 5.0   CL 97   CO2 25   BUN 25*   CREATININE 4.6*   *       Recent Labs   Lab 06/16/23 2041   CALCIUM 9.1   ALBUMIN 3.2*   PHOS 4.1    MG 2.0       Recent Labs   Lab 07/08/22  0516   PTH, Intact 289.8 H       Recent Labs   Lab 06/16/23  2226 06/16/23  2340 06/17/23  0043 06/17/23  0725   POCTGLUCOSE 252* 149* 159* 270*       Recent Labs   Lab 08/24/22  0218 12/23/22  1413 03/03/23  0547   Hemoglobin A1C 6.2 7.5 H 5.8       Recent Labs   Lab 06/16/23  2041   WBC 5.08   HGB 9.8*   HCT 29.1*   *   MCV 88   MCHC 33.7   MONO 11.0  0.6       Recent Labs   Lab 06/16/23  2041   BILITOT 0.7   PROT 7.3   ALBUMIN 3.2*   ALKPHOS 157*   ALT 19   AST 28       Recent Labs   Lab 10/24/22  0107 11/19/22  1210 02/05/23  0156   Color, UA Yellow Yellow Yellow   Appearance, UA Clear Clear Clear   pH, UA 8.0 8.0 >8.0 A   Specific [place], UA 1.020 1.025 1.020   Protein, UA 4+ 4+ 3+ A   Glucose, UA 4+ A 4+ A 2+ A   Ketones, UA Negative Negative 1+ A   Urobilinogen, UA Negative Negative Negative   Bilirubin (UA) Negative 1+ A 2+ A   Occult Blood UA 2+ A 2+ A 3+ A   Nitrite, UA Negative Negative Negative   RBC, UA 41 H 38 H 15 H   WBC, UA 24 H 25 H 1   Bacteria Negative Negative Rare   Hyaline Casts, UA 6 A 2 A 2 A       Recent Labs   Lab 04/10/23  1103 05/11/23  1401 06/03/23  1240   POC PH 7.372 7.386 7.358   POC PCO2 49.8 H 47.5 H 51.2 H   POC HCO3 29.0 H 28.5 H 28.8 H   POC PO2 19 L 26 L 36 L   POC SATURATED O2 27 L 47 L 65 L   POC BE 4 4 3   Sample VENOUS VENOUS VENOUS       Microbiology Results (last 7 days)       ** No results found for the last 168 hours. **            ASSESSMENT/PLAN:     ESRD on HD MWF via cath  HD cath malfunction, mechanical  Next HD today via new line. If working OK can be dc home after.  Continue current dialysis prescription.  Renal diet - low K, low phos.  No IVs or BP checks on access and/or non-dominant arm.    Anemia of CKD  Hgb and HCT are acceptable. Monitor for now.  Will provide SHELLEY and/or IV iron PRN.    MBD / Secondary HPT  Ca, Phos, PTH and vitamin D levels are acceptable.   Phos binders, vitamin D and analogues,  calcimimetics will be given as needed.    HTN  BP seem controlled.   Tolerate asymptomatic HTN up to -160.  Continue home meds.  Low sodium diet.    Uncontrolled DM  Management per primary team.    Thank you for allowing us to participate in the care of your patient!   We will follow the patient and provide recommendations as needed.    Patient care time was spent personally by me on the following activities:     Obtaining a history.  Examination of patient.  Providing medical care at the patients bedside.  Developing a treatment plan with patient or surrogate and bedside caregivers.  Ordering and reviewing laboratory studies, radiographic studies, pulse oximetry.  Ordering and performing treatments and interventions.  Evaluation of patient's response to treatment.  Discussions with consultants while on the unit and immediately available to the patient.  Re-evaluation of the patient's condition.  Documentation in the medical record.     Mando Benitez MD    [facility]  [address] [place] [id number]    [phone] - tel  [phone] - fax    6/17/2023

## 2023-06-17 NOTE — PROGRESS NOTES
06/17/23 1835   Required for all Hemodialysis Patients   Hepatitis Status negative  (Immune)   Treatment Type   Treatment Type Maintenance   Vital Signs   Temp 97.9 °F (36.6 °C)   Temp Source Oral   Pulse 95   Heart Rate Source Monitor   Resp 16   SpO2 95 %   Pulse Oximetry Type Intermittent   Device (Oxygen Therapy) room air   BP (!) 145/98   BP Location Right arm   BP Method Automatic   Patient Position Lying   Assessments (Pre/Post)   Consent Obtained yes   Date Hepatitis Profile Obtained 02/07/23   Blood Liters Processed (BLP) 65.6   Transport Modality bed  (HD RN left bedside)   Level of Consciousness (AVPU) alert        Hemodialysis Catheter 06/17/23 1135 right internal jugular   Placement Date/Time: 06/17/23 1135   Present Prior to Hospital Arrival?: No  Hand Hygiene: Performed  Barrier Precautions: Performed  Skin Antisepsis: ChloraPrep  Hemodialysis Catheter Type: Tunneled catheter  Location: right internal jugular  Cathete...   Line Necessity Review CRRT/HD   Verification by X-ray Yes   Site Assessment No drainage;No redness;No swelling;No warmth   Line Securement Device Secured with sutures   Dressing Type CHG impregnated dressing/sponge;Central line dressing   Dressing Status Clean;Dry;Intact   Dressing Intervention Sterile dressing change   Date on Dressing 06/17/23   Dressing Due to be Changed 06/23/23   Venous Patency/Care flushed w/o difficulty;normal saline locked  (Cappesd)   Arterial Patency/Care flushed w/o difficulty;normal saline locked  (Capped)   Post-Hemodialysis Assessment   Rinseback Volume (mL) 250 mL   Blood Volume Processed (Liters) 65.6 L   Dialyzer Clearance Lightly streaked   Duration of Treatment 180 minutes   Additional Fluid Intake (mL) 500 mL   Total UF (mL) 3000 mL   Net Fluid Removal 2500   Patient Response to Treatment Tolerated   Post-Treatment Weight 56.7 kg (125 lb)   Treatment Weight Change -1.8   Post-Hemodialysis Comments HD complete/Blood reinfused per protocol. No  Heparin in Cath due to Throbocytopenia warning.     Pt educated on Dialysis and Cath Care    1837  Report given to Matthew LINTON

## 2023-06-17 NOTE — PROVIDER TRANSFER
Outside Transfer Acceptance Note / Regional Referral Center    Referring facility: [facility]  Referring provider: Ad Berman  Accepting facility: [facility]  Accepting provider: Kaykay Carver  Admitting provider: Linda Cueto  Reason for transfer: Higher Level of Care   Transfer diagnosis: Apolinar dialysis catheter dislodged  Transfer specialty requested: Hospital Medicine and General Surgery  Transfer specialty notified: Yes  Transfer level: NUMBER 1-5: 2  Bed type requested: med surg  Isolation status: No active isolations   Admission class or status: Obs      Narrative     33 year old female with Seizure disorder, SVT, ESRD ( HD TTS), HTN, T2DM, chronic diastolic HF, drug seeking behavior who presented to the [facility] ED with a dislodged apolinar dialysis cath that happened by accident in the shower and diffuse abdominal pain and nausea for the last few days. She is supposed to be on 18 U of detemir at home in the evening but unclear if patient has been adherent. In the ED, she was found to be hyperglycemia ( >500) concerning for possible HHS vs DKA. Patient has a history of DKA. However, she does not currently have an AG and is reportedly not displaying signs of nausea and emesis in the ED and BG improved to 250s with 10 U of regular insulin given in the ED. However, DKA/HHS differential is still high should her BG continue to remain elevated despite therapy or she developed worsening abdominal pain or nausea. Recommend obtaining additional labs for work up if there is further concern such as BHB, lactic acid, UA to assess for ketone, VBG to assess for pH, etc.       Of note, patient was displaying several characteristics of drug seeking behavior in the ED this evening such as attempting to vomit by self-induction with her finger down her throat and repeatedly writhing in the bed requesting opiates which would likely make her condition worse. She is currently afebrile,  hemodynamically stable with overall labs close to her baseline outside of the elevated glucose    Transfer requested for higher level of care and General Surgery evaluation for replacement of patient's dialysis cath. Dr. Thurman has agreed to see the patient in the morning for replacement of her dialysis cath. Last dialysis was on Thursday 6/15 and patient is due for her next HD session on Saturday 6/17. Recommend patient remain NPO after midnight in anticipation of any possible procedure in the morning      Instructions    [facility]  Admit to Hospital Medicine with Gen surg consults  Upon patient arrival to floor, please contact Hospital Medicine on call.

## 2023-06-17 NOTE — PLAN OF CARE
Report to [facility] [place]. Patient denies pain, no nausea, dressing to right chest dry intact ice to dressing, dressing to left and right neck dry intact no drainage, vs stable, resting quietly, no one at hospital with patient.

## 2023-06-17 NOTE — CONSULTS
GENERAL SURGERY  INPATIENT CONSULT    REASON FOR CONSULT: Dialysis access    HPI: Tabby Howard is a 33 y.o. female with history of seizure disorder, end-stage renal disease on hemodialysis Tuesday Thursday Saturday, hypertension, type 2 diabetes, chronic diastolic heart failure, drug-seeking behavior who presented to [facility] emergency room after the patient accidentally dislodged her existing tunneled hemodialysis catheter. There was no significant bleeding. Transfer was arranged [facility] as [facility] did not have general surgery coverage. Patient was accepted by the Medicine Service. She was scheduled for hemodialysis today. Nephrology is on board. Has had previous dialysis catheters both sides.    I have reviewed the patient's chart including prior progress notes, procedures and testing.     ROS:   Review of Systems    PROBLEM LIST:  Patient Active Problem List   Diagnosis    SVT (supraventricular tachycardia)    Gastritis    Gallstones    DKA (diabetic ketoacidosis)    Chronic kidney disease-mineral and bone disorder    Homelessness    Gastroparesis - suspected, unconfirmed    Sickle cell trait    Seizure disorder    Nephrotic range proteinuria    Pericardial effusion    ESRD (end stage renal disease)    Deep vein thrombosis (DVT) of non-extremity vein    Uncontrolled hypertension    Malnutrition of moderate degree    Thrombocytopenia    Vision loss, right eye    Anemia of chronic kidney failure, stage 5    Stable proliferative diabetic retinopathy of both eyes associated with type 2 diabetes mellitus    Vitreous hemorrhage, both eyes    Cortical cataract of both eyes    Adjustment disorder    Drug-seeking behavior    Type 2 diabetes mellitus with hyperglycemia, with long-term current use of insulin, previous HbA1c 5.8%    Chronic congestive heart failure with left ventricular diastolic dysfunction    Frequent hospital admissions    Anemia in ESRD (end-stage renal disease)    Chronic deep vein  thrombosis (DVT) of left upper extremity    Mood disorder    Vomiting    Hyponatremia    Chronic generalized abdominal pain    Displacement of vascular dialysis catheter    Hypertension    Insulin dependent type 2 diabetes mellitus         HISTORY  Past Medical History:   Diagnosis Date    ESRD on hemodialysis 2022    Gastritis 2022    EGD was 22    Gastroparesis 2022    has not had Emptying study    Heart failure with preserved ejection fraction 2022    EF 55% on 3/22    History of supraventricular tachycardia     Hyperkalemia 2022    Hypertensive emergency 2022    Sickle cell trait 2022    Type 2 diabetes mellitus        Past Surgical History:   Procedure Laterality Date     SECTION      x 3    COLONOSCOPY      COLONOSCOPY N/A 2022    Procedure: COLONOSCOPY;  Surgeon: Jagdeep Cedeno MD;  Location: [facility];  Service: Endoscopy;  Laterality: N/A;    ESOPHAGOGASTRODUODENOSCOPY N/A 10/18/2019    Procedure: ESOPHAGOGASTRODUODENOSCOPY (EGD);  Surgeon: Gianluca Mendez MD;  Location: [facility];  Service: Endoscopy;  Laterality: N/A;    ESOPHAGOGASTRODUODENOSCOPY N/A 2022    Procedure: EGD (ESOPHAGOGASTRODUODENOSCOPY);  Surgeon: Micky Paredes III, MD;  Location: [facility];  Service: Endoscopy;  Laterality: N/A;    ESOPHAGOGASTRODUODENOSCOPY N/A 2022    Procedure: EGD (ESOPHAGOGASTRODUODENOSCOPY);  Surgeon: Marcelo Zhong MD;  Location: [facility];  Service: Endoscopy;  Laterality: N/A;    LAPAROSCOPIC CHOLECYSTECTOMY N/A 2022    Procedure: CHOLECYSTECTOMY, LAPAROSCOPIC;  Surgeon: Grey Perez MD;  Location: [facility];  Service: General;  Laterality: N/A;    PLACEMENT OF DUAL-LUMEN VASCULAR CATHETER Left 2022    Procedure: INSERTION-CATHETER-JOSEPH;  Surgeon: Dionte Gan MD;  Location: [facility] OR;  Service: General;  Laterality: Left;    PLACEMENT OF DUAL-LUMEN VASCULAR CATHETER Right 2022    Procedure:  INSERTION-CATHETER-Hemosplit;  Surgeon: Dionte Gan MD;  Location: [facility] OR;  Service: General;  Laterality: Right;       Social History     Tobacco Use    Smoking status: Never    Smokeless tobacco: Never   Substance Use Topics    Alcohol use: Not Currently    Drug use: No       Family History   Problem Relation Age of Onset    Diabetes Mother     Diabetes Father          MEDS:  Current Facility-Administered Medications on File Prior to Encounter   Medication Dose Route Frequency Provider Last Rate Last Admin    [COMPLETED] insulin regular injection 10 Units 0.1 mL  10 Units Intravenous ED 1 Time Ad Berman MD   10 Units at 06/16/23 2120     Current Outpatient Medications on File Prior to Encounter   Medication Sig Dispense Refill    amLODIPine (NORVASC) 5 MG tablet Take 1 tablet (5 mg total) by mouth once daily. 30 tablet 1    apixaban (ELIQUIS) 5 mg Tab Take 1 tablet (5 mg total) by mouth 2 (two) times daily. 60 tablet 2    carvediloL (COREG) 25 MG tablet Take 1 tablet (25 mg total) by mouth 2 (two) times daily. 60 tablet 5    FLUoxetine 20 MG capsule Take 1 capsule (20 mg total) by mouth once daily. 30 capsule 5    gabapentin (NEURONTIN) 100 MG capsule Take 1 capsule by mouth 3 times daily 90 capsule 2    gabapentin (NEURONTIN) 300 MG capsule Take 2 capsules (600 mg total) by mouth 2 (two) times a day. 120 capsule 1    hydrALAZINE (APRESOLINE) 100 MG tablet Take 1 tablet (100 mg total) by mouth every 8 (eight) hours. 60 tablet 0    hydrALAZINE (APRESOLINE) 100 MG tablet Take 1 tablet (100 mg total) by mouth 2 (two) times a day. 60 tablet 2    HYDROcodone-acetaminophen (NORCO) 7.5-325 mg per tablet Take 1 tablet by mouth every 6 (six) hours as needed for Pain. 30 tablet 0    insulin aspart U-100 (NOVOLOG FLEXPEN U-100 INSULIN) 100 unit/mL (3 mL) InPn pen Inject 5 Units into the skin 3 (three) times daily before meals. 15 mL 2    insulin aspart U-100 (NOVOLOG) 100 unit/mL (3 mL) InPn pen Inject 4 Units  "into the skin 3 (three) times daily with meals. 15 mL 3    insulin detemir U-100, Levemir, (LEVEMIR FLEXPEN) 100 unit/mL (3 mL) InPn pen Inject 18 Units into the skin every evening. 9 mL 5    insulin detemir U-100, Levemir, 100 unit/mL (3 mL) SubQ InPn pen Inject 18 Units into the skin once daily. 15 mL 3    isosorbide mononitrate (IMDUR) 30 MG 24 hr tablet Take 3 tablets (90 mg total) by mouth once daily. 90 tablet 11    isosorbide mononitrate (IMDUR) 30 MG 24 hr tablet Take 1 tablet (30 mg total) by mouth once daily. 30 tablet 1    levETIRAcetam (KEPPRA) 500 MG Tab Take 1 tablet twice a day by oral route for 30 days. 60 tablet 2    ondansetron (ZOFRAN-ODT) 4 MG TbDL Place 1 TABLET (4 mg) ON OR UNDER THE TONGUE every 8 hours 24 tablet 0    pantoprazole (PROTONIX) 40 MG tablet Take 1 tablet every day by oral route for 30 days. 30 tablet 2    pantoprazole (PROTONIX) 40 MG tablet Take 1 tablet every day by oral route for 30 days. 30 tablet 2    promethazine (PHENERGAN) 25 MG suppository Place 1 suppository (25 mg total) rectally every 6 (six) hours as needed for Nausea. 10 suppository 0    sucralfate (CARAFATE) 100 mg/mL suspension Take 10 mLs (1,000 mg total) by mouth 4 (four) times daily. 1200 mL 1    apixaban (ELIQUIS) 5 mg Tab Take 1 tablet (5 mg total) by mouth 2 (two) times daily. 60 tablet 2    lancets 33 gauge Misc Use to test twice daily 100 each 5    pen needle, diabetic 31 gauge x 3/16" Ndle Use as directed to inject insulin 5 times daily 100 each 11    [DISCONTINUED] atenoloL (TENORMIN) 50 MG tablet Take 1 tablet (50 mg total) by mouth every other day. 45 tablet 3    [DISCONTINUED] dicyclomine (BENTYL) 10 MG capsule Take 1 capsule (10 mg total) by mouth 4 (four) times daily as needed (abdominal pain). 20 capsule 0    [DISCONTINUED] omeprazole (PRILOSEC) 20 MG capsule Take 2 capsules (40 mg total) by mouth once daily. for 10 days 20 capsule 0       ALLERGIES:  Review of patient's allergies indicates: "   Allergen Reactions    Penicillins Hives         VITALS:  Temp:  [97.9 °F (36.6 °C)-98.5 °F (36.9 °C)] 98.2 °F (36.8 °C)  Pulse:  [88-93] 90  Resp:  [16-20] 20  SpO2:  [86 %-99 %] 95 %  BP: (143-176)/(87-99) 152/88    No intake/output data recorded.      PHYSICAL EXAM:  Physical Exam  Vitals reviewed.   Constitutional:       Appearance: Normal appearance.   Eyes:      General: No scleral icterus.  Cardiovascular:      Rate and Rhythm: Normal rate and regular rhythm.      Pulses: Normal pulses.   Abdominal:      General: There is no distension.      Palpations: Abdomen is soft.      Tenderness: There is abdominal tenderness.   Musculoskeletal:         General: No swelling or tenderness. Normal range of motion.      Cervical back: Normal range of motion and neck supple. No rigidity.   Skin:     General: Skin is warm.      Coloration: Skin is not jaundiced.      Findings: No erythema.      Comments: No hematoma at previous right line site   Neurological:      General: No focal deficit present.      Mental Status: She is alert and oriented to person, place, and time.   Psychiatric:         Mood and Affect: Mood normal.         Behavior: Behavior normal.         Thought Content: Thought content normal.         LABS:  Lab Results   Component Value Date    WBC 5.08 06/16/2023    RBC 3.32 (L) 06/16/2023    HGB 9.8 (L) 06/16/2023    HCT 29.1 (L) 06/16/2023    HCT 17 (LL) 03/20/2023     (L) 06/16/2023     Lab Results   Component Value Date     (HH) 06/16/2023     (L) 06/16/2023    K 5.0 06/16/2023    CL 97 06/16/2023    CO2 25 06/16/2023    BUN 25 (H) 06/16/2023    CREATININE 4.6 (H) 06/16/2023    CALCIUM 9.1 06/16/2023     Lab Results   Component Value Date    ALT 19 06/16/2023    AST 28 06/16/2023    ALKPHOS 157 (H) 06/16/2023    BILITOT 0.7 06/16/2023     Lab Results   Component Value Date    MG 2.0 06/16/2023    PHOS 4.1 06/16/2023       STUDIES:  Images and reports were personally reviewed.     N/a      ASSESSMENT & PLAN:  33 y.o. female with end-stage renal disease, dislodged hemodialysis catheter  -patient needs long-term access for hemodialysis  -current access was dislodged but there is no sign of active bleeding, hematoma or infection  -on long-term anticoagulation however dialysis catheter required for treatment and benefits outweigh risk at this time  -discussed indication for placement of tunneled hemodialysis line with the patient who is in agreement, risks including pain, bleeding, scarring, infection, occlusion, injury to artery, injury to lung, arrhythmia, etc. were reviewed and she expressed understanding  -to OR today for replacement of line  -if postoperative chest x-rays okay should be cleared for hemodialysis as needed  -likely okay for discharge home after dialysis  -no need for surgical follow-up unless there are issues with a line

## 2023-06-17 NOTE — ED NOTES
Spoke with Transfer center. Bed assignment given for room # 315. Number for report is [phone]

## 2023-06-17 NOTE — ED NOTES
Attempted to call second number provided by [facility] for prior auth and sounded as if it were someone's personal cell phone to leave a message. Notified [facility] and states will attempt to get correct phone number.

## 2023-06-17 NOTE — ASSESSMENT & PLAN NOTE
Admit to med/surg  Consult Dr. Thurman for replacement of HD catheter  Will need HD after catheter is placed  NPO for procedure  Holding eliquis for procedure, resume when clinically indicated

## 2023-06-17 NOTE — ANESTHESIA PREPROCEDURE EVALUATION
06/17/2023  Tabby Howard is a 33 y.o., female.      Pre-op Assessment    I have reviewed the Patient Summary Reports.     I have reviewed the Nursing Notes. I have reviewed the NPO Status.   I have reviewed the Medications.     Review of Systems  Anesthesia Hx:  No problems with previous Anesthesia    Social:  Non-Smoker    Hematology/Oncology:         -- Anemia: Hematology Comments: Sickle cell trait, H/H 8/24, plt's 124k     Cardiovascular:   Hypertension, well controlled Dysrhythmias (SVT) CHF ECG has been reviewed. Pericardial effusion  CTA -   Impression:     Continued moderate pericardial effusion.  Mild cardiomegaly.  No CTA evidence of pulmonary embolus or aortic dissection.  Decreased right pleural effusion, patchy interstitial infiltrate in the lung fields bilaterally.   Pulmonary:   Pneumonia Pleural effusion   Renal/:   Chronic Renal Disease, ESRD, Dialysis Last dialyzed 6/15- K 5.0   Hepatic/GI:   GERD    Neurological:   Seizures    Endocrine:   Diabetes, poorly controlled, type 2  Obesity / BMI > 30, Morbid Obesity / BMI > 40  Psych:   Psychiatric History          Physical Exam  General: Well nourished, Cooperative, Alert and Oriented    Airway:  Mallampati: I   Mouth Opening: Normal  Neck ROM: Normal ROM        Anesthesia Plan  Type of Anesthesia, risks & benefits discussed:    Anesthesia Type: MAC  Intra-op Monitoring Plan: Standard ASA Monitors  Post Op Pain Control Plan: multimodal analgesia and IV/PO Opioids PRN  Induction:  IV  Airway Plan: Direct, Video and Fiberoptic, Post-Induction  Informed Consent: Informed consent signed with the Patient and all parties understand the risks and agree with anesthesia plan.  All questions answered.   ASA Score: 4  Anesthesia Plan Notes: Pt in ICU acutely hypoxic, in DKA and needing dialysis.  Plan emergency GETA, remain intubated and return to  ICU.    Ready For Surgery From Anesthesia Perspective.     .

## 2023-06-17 NOTE — ED PROVIDER NOTES
"Encounter Date: 2023       History     Chief Complaint   Patient presents with    Vascular Access Problem     R chest dialysis catheter fell out while pt in bathtub. Dressing in place. Bleeding controlled.      33-year-old female, here from home via private vehicle, states that she was taking a bath this afternoon when her dialysis catheter "fell out".  Bleeding is well controlled.  Patient is scheduled for dialysis tomorrow.  She has no other complaints.  Patient gets her dialysis at [facility].  She states that the catheter was originally placed at [facility] in July of last year    Review of patient's allergies indicates:   Allergen Reactions    Penicillins Hives     Past Medical History:   Diagnosis Date    ESRD on hemodialysis 2022    Gastritis 2022    EGD was 22    Gastroparesis 2022    has not had Emptying study    Heart failure with preserved ejection fraction 2022    EF 55% on 3/22    History of supraventricular tachycardia     Hyperkalemia 2022    Hypertensive emergency 2022    Sickle cell trait 2022    Type 2 diabetes mellitus      Past Surgical History:   Procedure Laterality Date     SECTION      x 3    COLONOSCOPY      COLONOSCOPY N/A 2022    Procedure: COLONOSCOPY;  Surgeon: Jagdeep Cedeno MD;  [facility]: [facility];  Service: Endoscopy;  Laterality: N/A;    ESOPHAGOGASTRODUODENOSCOPY N/A 10/18/2019    Procedure: ESOPHAGOGASTRODUODENOSCOPY (EGD);  Surgeon: Gianluca Mendez MD;  Location: [facility];  Service: Endoscopy;  Laterality: N/A;    ESOPHAGOGASTRODUODENOSCOPY N/A 2022    Procedure: EGD (ESOPHAGOGASTRODUODENOSCOPY);  Surgeon: Micky Paredes III, MD;  Location: [facility];  Service: Endoscopy;  Laterality: N/A;    ESOPHAGOGASTRODUODENOSCOPY N/A 2022    Procedure: EGD (ESOPHAGOGASTRODUODENOSCOPY);  Surgeon: Marcelo Zhong MD;  Location: [facility];  Service: Endoscopy;  Laterality: N/A;    LAPAROSCOPIC " CHOLECYSTECTOMY N/A 07/30/2022    Procedure: CHOLECYSTECTOMY, LAPAROSCOPIC;  Surgeon: Grey Perez MD;  Location: [facility] OR;  Service: General;  Laterality: N/A;    PLACEMENT OF DUAL-LUMEN VASCULAR CATHETER Left 07/12/2022    Procedure: INSERTION-CATHETER-JOSEPH;  Surgeon: Dionte Gan MD;  Location: [facility] OR;  Service: General;  Laterality: Left;    PLACEMENT OF DUAL-LUMEN VASCULAR CATHETER Right 07/26/2022    Procedure: INSERTION-CATHETER-Hemosplit;  Surgeon: Dionte Gan MD;  Location: [facility] OR;  Service: General;  Laterality: Right;     Family History   Problem Relation Age of Onset    Diabetes Mother     Diabetes Father      Social History     Tobacco Use    Smoking status: Never    Smokeless tobacco: Never   Substance Use Topics    Alcohol use: Not Currently    Drug use: No     Review of Systems   Constitutional: Negative.    HENT: Negative.     Eyes: Negative.    Respiratory: Negative.     Cardiovascular: Negative.    Gastrointestinal: Negative.    Endocrine: Negative.    Genitourinary: Negative.    Musculoskeletal: Negative.    Skin: Negative.    Allergic/Immunologic: Negative.    Neurological: Negative.    Hematological: Negative.    Psychiatric/Behavioral: Negative.       Physical Exam     Initial Vitals [06/16/23 2018]   BP Pulse Resp Temp SpO2   (!) 143/92 93 18 98.5 °F (36.9 °C) 98 %      MAP       --         Physical Exam    Nursing note and vitals reviewed.  Constitutional: She appears well-developed and well-nourished. She is not diaphoretic. No distress.   HENT:   Head: Normocephalic and atraumatic.   Nose: Nose normal.   Eyes: Conjunctivae and EOM are normal. Pupils are equal, round, and reactive to light. No scleral icterus.   Neck: Neck supple. No JVD present.   Normal range of motion.  Cardiovascular:  Normal rate, regular rhythm, normal heart sounds and intact distal pulses.           No murmur heard.  Pulmonary/Chest: Breath sounds normal. No stridor. No respiratory distress. She has no  wheezes. She exhibits no tenderness.   Wound in right upper chest wall consistent with dislodged dialysis catheter.  Bleeding well controlled.   Abdominal: Abdomen is soft. Bowel sounds are normal. She exhibits no distension. There is no abdominal tenderness.   Musculoskeletal:         General: No tenderness or edema. Normal range of motion.      Cervical back: Normal range of motion and neck supple.     Neurological: She is alert and oriented to person, place, and time. She has normal strength. No cranial nerve deficit or sensory deficit. GCS score is 15. GCS eye subscore is 4. GCS verbal subscore is 5. GCS motor subscore is 6.   Skin: Skin is warm and dry. Capillary refill takes less than 2 seconds. No rash noted. No erythema.   Psychiatric: She has a normal mood and affect. Her behavior is normal.       ED Course   Procedures  Labs Reviewed   CBC W/ AUTO DIFFERENTIAL - Abnormal; Notable for the following components:       Result Value    RBC 3.32 (*)     Hemoglobin 9.8 (*)     Hematocrit 29.1 (*)     RDW 16.0 (*)     Platelets 121 (*)     Immature Granulocytes 1.0 (*)     Immature Grans (Abs) 0.05 (*)     All other components within normal limits   COMPREHENSIVE METABOLIC PANEL - Abnormal; Notable for the following components:    Sodium 135 (*)     Glucose 523 (*)     BUN 25 (*)     Creatinine 4.6 (*)     Albumin 3.2 (*)     Alkaline Phosphatase 157 (*)     eGFR 12.1 (*)     All other components within normal limits    Narrative:     Glucose critical result(s) called and verbal readback obtained from   Sergo Wright RN. by TH3 06/16/2023 21:09   POCT GLUCOSE - Abnormal; Notable for the following components:    POCT Glucose 252 (*)     All other components within normal limits   POCT GLUCOSE - Abnormal; Notable for the following components:    POCT Glucose 149 (*)     All other components within normal limits   POCT GLUCOSE - Abnormal; Notable for the following components:    POCT Glucose 159 (*)     All other  components within normal limits   MAGNESIUM   PHOSPHORUS          Imaging Results    None          Medications   insulin regular injection 10 Units 0.1 mL (10 Units Intravenous Given 6/16/23 2120)     Medical Decision Making:   Differential Diagnosis:   Dialysis catheter displacement, wound hemorrhage, pneumothorax, electrolyte abnormalities, uremia, etc..  ED Management:  Patient's labs are unremarkable.  Electrolytes are good.  BUN and creatinine are elevated, consistent with her status.  Patient is alert and oriented with no symptoms.  Unfortunately patient will need to be transferred to another facility for replacement of her dialysis catheter.  She is scheduled for hemodialysis in the morning.  Patient understands the need for transfer.  Will attempted transfer to the [facility] where most of her care is given.                        Clinical Impression:   Final diagnoses:  [T82.9XXA] Complication associated with dialysis catheter (Primary)        ED Disposition Condition    Transfer to Another Facility Stable                Ad Berman MD  06/19/23 [id number]

## 2023-06-17 NOTE — PLAN OF CARE
Pt clear for DC from case management standpoint. Discharging to home           06/17/23 1304   Final Note   Assessment Type Final Discharge Note   Anticipated Discharge Disposition Home

## 2023-06-17 NOTE — ED NOTES
[facility] called and stated we need to call for prior authorization for transport. Attempted to call the number they provided and it was for [place] and unable to provide prior auth.

## 2023-06-17 NOTE — ASSESSMENT & PLAN NOTE
Patient's FSGs are controlled on current medication regimen.  Last A1c reviewed-   Lab Results   Component Value Date    HGBA1C 5.8 03/03/2023     Most recent fingerstick glucose reviewed-   Recent Labs   Lab 06/16/23  2226 06/16/23  2340 06/17/23  0043   POCTGLUCOSE 252* 149* 159*     Current correctional scale  Low  Maintain anti-hyperglycemic dose as follows-   Antihyperglycemics (From admission, onward)    Start     Stop Route Frequency Ordered    06/17/23 2100  insulin detemir U-100 (Levemir) pen 18 Units         -- SubQ Nightly 06/17/23 0623    06/17/23 0723  insulin aspart U-100 pen 1-10 Units         -- SubQ Before meals & nightly PRN 06/17/23 0623        Hold Oral hypoglycemics while patient is in the hospital.

## 2023-06-17 NOTE — OP NOTE
DATE OF PROCEDURE: 06/17/2023    PREOPERATIVE DIAGNOSIS:   1. End-stage renal disease  2. Dislodged hemodialysis catheter    POSTOPERATIVE DIAGNOSIS: Same    PROCEDURE:   1. Aborted attempt at left IJ placement   2. Right IJ tunneled hemodialysis catheter placement     SURGEON: Carlos Thurman M.D    ASSISTANT: None    ANESTHESIA: Local MAC    ESTIMATED BLOOD LOSS: 20 cc    SPECIMEN: None    CONDITION: Stable    COMPLICATIONS: None    FINDINGS:   1. Attempted left IJ placement however wire could not be advanced through the innominate vein therefore aborted  2. Successful placement of right IJ tunneled dialysis catheter, both ports aspirated and flushed with ease     INDICATIONS: The patient is a 33-year-old female with end-stage renal disease who has been using a tunneled right IJ hemodialysis catheter for approximally 1 year. Patient presented outside emergency room after accidental dislodgement. Was transferred to [facility] for surgical evaluation and placement of dialysis catheter. Patient counseled on options and agreed proceed.    PROCEDURE IN DETAIL: Patient was taken operating room placed in supine position where monitored anesthesia care was administered. Arms were placed at her side. Initial evaluation of IJ using ultrasound showed a fibrinous sheath on the right side therefore elected attempt left side placement 1st.  The left neck was prepped and draped in typical sterile fashion.  Time-out performed by members of the operative team. Patient was then slight Trendelenburg position.  Using ultrasound the left IJ was identified and found to be widely patent.  Local anesthetic was injected. A stab incision was made and spread with a hemostat.  An 18 gauge needle was inserted under constant negative pressure and under ultrasound guidance.  We had return of dark red nonpulsatile blood. A wire was then initially pass which 1st when easily however met resistance.  Under fluoroscopic evaluation the  wire appeared to be hitting an obstruction in the innominate vein.  We could not get it to pass from the innominate down into the superior vena cava.  Manipulation under fluoroscopic guidance was attempted but still could not pass therefore it was aborted.  The wire was withdrawn and pressure was held at the neck.  The neck incision was closed with 4-0 Monocryl subcuticular stitch and a pressure dressing was applied.    Then turned my attention to the right side.  This area was prepped and draped in sterile fashion.  Again the jugular vein was identified and found to be patent though there was a fibrinous sheath present from the previous catheter.  This area was marked and localized.  Stab incision was made then 18 gauge needle was inserted under negative pressure. We had return of dark red nonpulsatile blood.  Initially wire passed easily but was found to curled back towards the head.  Under fluoroscopic manipulation we were able to get it to pass beyond the innominate and down into the inferior vena cava. A stab incision was made on the chest after injecting local anesthetic and a 19 [place] HemoSplit catheter was tunneled from the chest stab incision site to the neck incision site. The felt cuff was buried in the subcutaneous tissue. We then serially dilated the wire under fluoroscopic guidance met no significant resistance.  Finally the dilator and sheath were placed.  The wire and dilator were then removed the catheter was inserted through the sheath.  The sheath was peeled away. Fluoroscopic evaluation showed the catheter tips and appropriate position and the curve of the catheter without any significant kinks.  Both ports were then aspirated and flushed with ease. Sterile caps were placed over the ports. The neck incision was closed with 4-0 Monocryl subcuticular stitch and a pressure dressing was applied.  Biopatch was placed at the catheter exit site and the catheter was secured to the skin with 2-0 Prolene  sutures.  Occlusive bandage was placed over this.  Patient was aroused from sedation, taken to recovery room and stable condition.  All counts correct x2 the case.  I was present scrubbed throughout all operative portions of the case.    DISPO: Return to floor for dialysis

## 2023-06-17 NOTE — PLAN OF CARE
Problem: Adjustment to Illness (Sepsis/Septic Shock)  Goal: Optimal Coping  Outcome: Ongoing, Progressing     Problem: Adult Inpatient Plan of Care  Goal: Plan of Care Review  Outcome: Ongoing, Progressing  Goal: Patient-Specific Goal (Individualized)  Outcome: Ongoing, Progressing  Goal: Absence of Hospital-Acquired Illness or Injury  Outcome: Ongoing, Progressing  Goal: Optimal Comfort and Wellbeing  Outcome: Ongoing, Progressing

## 2023-06-17 NOTE — NURSING
Nurses Note -- 4 Eyes      6/17/2023   6:25 AM      Skin assessed during: Admit      [] No Altered Skin Integrity Present    []Prevention Measures Documented      [x] Yes- Altered Skin Integrity Present or Discovered   [x] LDA Added if Not in Epic (Describe Wound)   [x] New Altered Skin Integrity was Present on Admit and Documented in LDA   [x] Wound Image Taken    Wound Care Consulted? Yes    Attending Nurse:  Sunitha Garcia LPN     Second RN/Staff Member:  Keanu Denton RN

## 2023-06-18 NOTE — PLAN OF CARE
Dialysis completed without difficulty.  Discharge orders in.  Tele and HL discontinued.  Personal belongings with pt.  Mother on the way.

## 2023-06-18 NOTE — HOSPITAL COURSE
Patient was observed by the hospital medicine service.  Surgery was consulted and tunneled cath was replaced.  Patient received dialysis afterwards through the new dialysis line with no issues.  She was discharged home after being cleared by Nephrology and surgery.  She will follow-up with PCP.

## 2023-06-18 NOTE — DISCHARGE SUMMARY
"[facility] Hospital Medicine  Discharge Summary      Patient Name: Tabby Howard  MRN: [id number]  UNIQUE: [id number]  Patient Class: OP- Observation  Admission Date: 6/17/2023  Hospital Length of Stay: 1 days  Discharge Date and Time: 6/17/2023  7:25 PM  Attending Physician: No att. providers found   Discharging Provider: Linda Cueto MD  Primary Care Provider: AIDEN CONNER NP    Primary Care Team: Networked reference to record PCT     HPI:   Ms. Howard is a 33-year-old female with history of ESRD on hemodialysis(T-Th-S),  gastroparesis, seizures, C diff infection, IDDM, DVT Left Upper ext on eliquis, poor vision presented to [place] ED with complaints of accidentally pulling out her HD catheter. She states she was in the bath and noticed it hanging and then pulled it out. She was subsequently transferred to [facility] with plans for replacement by Dr. Thurman this morning and then she will need HD. She denies excessive bleeding, fever, chest pain, SOB. She does report generalized abd pain. She has chronic abd pain and is in her hospital bed squirming and asking for pain medication.     Documentation as per previous admission H&P 6/3/23:  "This patient has extremely poor compliance with medications, dialysis and has numerous ER visits and hospital admissions mainly for similar complaints. She has at least 28 admissions in last year(not counting ER visits), 21 CT scans of abdomen/pelvis/chest in last 6 months(not counting other imaging like x-rays, ultrasounds), has drug-seeking behavior and her abdominal pain mysteriously gets better with IV Dilaudid only.  Upon reviewing  it appears that she routinely gets prescription of gabapentin and numerous prescriptions of opioids for short durations from different providers."          Procedure(s) (LRB):  Insertion,catheter,tunneled (N/A)      Hospital Course:   Patient was observed by the hospital medicine service.  Surgery was consulted and " tunneled cath was replaced.  Patient received dialysis afterwards through the new dialysis line with no issues.  She was discharged home after being cleared by Nephrology and surgery.  She will follow-up with PCP.       Goals of Care Treatment Preferences:  Code Status: Full Code    Health care agent: Step-Mother Tanya Howard / Father Garcia Howard  Health care agent number: [phone] / [phone]          What is most important right now is to focus on remaining as independent as possible.  Accordingly, we have decided that the best plan to meet the patient's goals includes continuing with treatment.      Consults:   Consults (From admission, onward)        Status Ordering Provider     Inpatient consult to General Surgery  Once        Provider:  Carlos Thurman Jr., MD    Completed JANELLE BENOIT     Inpatient consult to Nephrology  Once        Provider:  Mando Benitez MD    Completed JANELLE BENOIT          No new Assessment & Plan notes have been filed under this hospital service since the last note was generated.  Service: Hospital Medicine    Final Active Diagnoses:    Diagnosis Date Noted POA    PRINCIPAL PROBLEM:  Displacement of vascular dialysis catheter [T82.42XA] 06/17/2023 Yes    Hypertension [I10] 06/17/2023 Yes    Insulin dependent type 2 diabetes mellitus [E11.9, Z79.4] 06/17/2023 Not Applicable    Chronic generalized abdominal pain [R10.84, G89.29] 06/03/2023 Yes    Chronic deep vein thrombosis (DVT) of left upper extremity [I82.722] 04/10/2023 Yes    ESRD (end stage renal disease) [N18.6] 07/22/2022 Yes    Gastroparesis - suspected, unconfirmed [K31.84] 04/12/2022 Yes     Chronic      Problems Resolved During this Admission:       Discharged Condition: good    Disposition: Home or Self Care    Follow Up:   Follow-up Information     AIDEN CONNER NP Follow up in 3 day(s).    Specialty: Nurse Practitioner  Why: call to schedule follow up apt on Monday  Contact  information:  [place] [id number]  [phone]                       Patient Instructions:      Notify your health care provider if you experience any of the following:  temperature >100.4     Notify your health care provider if you experience any of the following:  persistent nausea and vomiting or diarrhea     Notify your health care provider if you experience any of the following:  difficulty breathing or increased cough     Notify your health care provider if you experience any of the following:  persistent dizziness, light-headedness, or visual disturbances     Notify your health care provider if you experience any of the following:  increased confusion or weakness     Activity as tolerated       Significant Diagnostic Studies: Labs:   BMP:   Recent Labs   Lab 06/16/23 2041   *   *   K 5.0   CL 97   CO2 25   BUN 25*   CREATININE 4.6*   CALCIUM 9.1   MG 2.0    and CBC   Recent Labs   Lab 06/16/23 2041   WBC 5.08   HGB 9.8*   HCT 29.1*   *     Radiology Results (last 7 days)    Procedure Component Value Units Date/Time   SURG FL Surgery Fluoro Usage [[id number]] Resulted: 06/17/23 1344   Order Status: Completed Updated: 06/17/23 1344   Narrative:     See OP Notes for results.     IMPRESSION: See OP Notes for results.             This procedure was auto-finalized by: Virtual Radiologist   X-Ray Chest AP Portable [[id number]] Resulted: 06/17/23 1254   Order Status: Completed Updated: 06/17/23 1257   Narrative:     EXAMINATION:   XR CHEST AP PORTABLE     CLINICAL HISTORY:   S/p tunneled line placement; End stage renal disease     TECHNIQUE:   Single frontal view of the chest was performed.     COMPARISON:   Chest portable of [place]elope 3, 2023     FINDINGS:   A double lumen central line enters from the right and ends in the superior vena cava.  There is cardiomegaly in a biventricular pattern.  No intrapulmonary mass or infiltrate is seen.  No pneumothorax or pleural effusion is noted.   "  Impression:       Double-lumen central line in position.  Cardiomegaly.       Electronically signed by: Gianluca Arriaga MD   Date: 06/17/2023   Time: 12:54       Pending Diagnostic Studies:     None         Medications:  Reconciled Home Medications:      Medication List      CHANGE how you take these medications    * gabapentin 100 MG capsule  Commonly known as: NEURONTIN  Take 1 capsule by mouth 3 times daily  What changed:   · how much to take  · how to take this  · when to take this     * gabapentin 300 MG capsule  Commonly known as: NEURONTIN  Take 2 capsules (600 mg total) by mouth 2 (two) times a day.  What changed: Another medication with the same name was changed. Make sure you understand how and when to take each.     levETIRAcetam 500 MG Tab  Commonly known as: KEPPRA  Take 1 tablet twice a day by oral route for 30 days.  What changed:   · how much to take  · when to take this     * pantoprazole 40 MG tablet  Commonly known as: PROTONIX  Take 1 tablet every day by oral route for 30 days.  What changed:   · how much to take  · when to take this     * pantoprazole 40 MG tablet  Commonly known as: PROTONIX  Take 1 tablet every day by oral route for 30 days.  What changed: Another medication with the same name was changed. Make sure you understand how and when to take each.         * This list has 4 medication(s) that are the same as other medications prescribed for you. Read the directions carefully, and ask your doctor or other care provider to review them with you.            CONTINUE taking these medications    amLODIPine 5 MG tablet  Commonly known as: NORVASC  Take 1 tablet (5 mg total) by mouth once daily.     BD ULTRA-FINE MINI PEN NEEDLE 31 gauge x 3/16" Ndle  Generic drug: pen needle, diabetic  Use as directed to inject insulin 5 times daily     carvediloL 25 MG tablet  Commonly known as: COREG  Take 1 tablet (25 mg total) by mouth 2 (two) times daily.     * ELIQUIS 5 mg Tab  Generic drug: " apixaban  Take 1 tablet (5 mg total) by mouth 2 (two) times daily.     * ELIQUIS 5 mg Tab  Generic drug: apixaban  Take 1 tablet (5 mg total) by mouth 2 (two) times daily.     FLUoxetine 20 MG capsule  Take 1 capsule (20 mg total) by mouth once daily.     * hydrALAZINE 100 MG tablet  Commonly known as: APRESOLINE  Take 1 tablet (100 mg total) by mouth every 8 (eight) hours.     * hydrALAZINE 100 MG tablet  Commonly known as: APRESOLINE  Take 1 tablet (100 mg total) by mouth 2 (two) times a day.     HYDROcodone-acetaminophen 7.5-325 mg per tablet  Commonly known as: NORCO  Take 1 tablet by mouth every 6 (six) hours as needed for Pain.     * insulin detemir U-100 (Levemir) 100 unit/mL (3 mL) Inpn pen  Inject 18 Units into the skin once daily.     * LEVEMIR FLEXPEN 100 unit/mL (3 mL) Inpn pen  Generic drug: insulin detemir U-100 (Levemir)  Inject 18 Units into the skin every evening.     * isosorbide mononitrate 30 MG 24 hr tablet  Commonly known as: IMDUR  Take 3 tablets (90 mg total) by mouth once daily.     * isosorbide mononitrate 30 MG 24 hr tablet  Commonly known as: IMDUR  Take 1 tablet (30 mg total) by mouth once daily.     lancets 33 gauge Misc  Use to test twice daily     * NovoLOG FlexPen U-100 Insulin 100 unit/mL (3 mL) Inpn pen  Generic drug: insulin aspart U-100  Inject 4 Units into the skin 3 (three) times daily with meals.     * NovoLOG FlexPen U-100 Insulin 100 unit/mL (3 mL) Inpn pen  Generic drug: insulin aspart U-100  Inject 5 Units into the skin 3 (three) times daily before meals.     ondansetron 4 MG Tbdl  Commonly known as: ZOFRAN-ODT  Place 1 TABLET (4 mg) ON OR UNDER THE TONGUE every 8 hours     promethazine 25 MG suppository  Commonly known as: PHENERGAN  Place 1 suppository (25 mg total) rectally every 6 (six) hours as needed for Nausea.     sucralfate 100 mg/mL suspension  Commonly known as: CARAFATE  Take 10 mLs (1,000 mg total) by mouth 4 (four) times daily.         * This list has 10  medication(s) that are the same as other medications prescribed for you. Read the directions carefully, and ask your doctor or other care provider to review them with you.            STOP taking these medications    dicyclomine 10 MG capsule  Commonly known as: BENTYL            Indwelling Lines/Drains at time of discharge:   Lines/Drains/Airways     Central Venous Catheter Line  Duration                Hemodialysis Catheter 06/17/23 1135 right internal jugular 1 day                Time spent on the discharge of patient: 35 minutes         Linda Cueto MD  Department of Hospital Medicine  [facility]

## 2023-06-20 NOTE — ANESTHESIA POSTPROCEDURE EVALUATION
Anesthesia Post Evaluation    Patient: Tabby Howard    Procedure(s) Performed: Procedure(s) (LRB):  Insertion,catheter,tunneled (N/A)    Final Anesthesia Type: MAC      Patient location during evaluation: PACU  Patient participation: Yes- Able to Participate  Level of consciousness: awake and alert and oriented  Post-procedure vital signs: reviewed and stable  Pain management: adequate  Airway patency: patent    PONV status at discharge: No PONV  Anesthetic complications: no      Cardiovascular status: blood pressure returned to baseline  Respiratory status: unassisted, spontaneous ventilation and room air  Hydration status: euvolemic  Follow-up not needed.          Vitals Value Taken Time   /98 06/17/23 1835   Temp 36.6 °C (97.9 °F) 06/17/23 1835   Pulse 95 06/17/23 1835   Resp 18 06/17/23 1902   SpO2 95 % 06/17/23 1835         Event Time   Out of Recovery 13:26:00         Pain/Lien Score: No data recorded

## 2023-07-10 ENCOUNTER — HOSPITAL ENCOUNTER (EMERGENCY)
Facility: HOSPITAL | Age: 33
Discharge: HOME | End: 2023-07-11
Attending: EMERGENCY MEDICINE
Payer: MEDICAID

## 2023-07-10 DIAGNOSIS — R10.9 ABDOMINAL PAIN: ICD-10-CM

## 2023-07-10 DIAGNOSIS — R10.84 RECURRENT GENERALIZED ABDOMINAL PAIN: Primary | ICD-10-CM

## 2023-07-10 LAB
BASOPHILS # BLD AUTO: 0.02 K/UL (ref 0–0.2)
BASOPHILS NFR BLD: 0.4 % (ref 0–1.9)
DIFFERENTIAL METHOD: ABNORMAL
EOSINOPHIL # BLD AUTO: 0.1 K/UL (ref 0–0.5)
EOSINOPHIL NFR BLD: 2.6 % (ref 0–8)
ERYTHROCYTE [DISTWIDTH] IN BLOOD BY AUTOMATED COUNT: 16.8 % (ref 11.5–14.5)
HCT VFR BLD AUTO: 34.8 % (ref 37–48.5)
HGB BLD-MCNC: 11.8 G/DL (ref 12–16)
IMM GRANULOCYTES # BLD AUTO: 0.03 K/UL (ref 0–0.04)
IMM GRANULOCYTES NFR BLD AUTO: 0.6 % (ref 0–0.5)
LYMPHOCYTES # BLD AUTO: 0.9 K/UL (ref 1–4.8)
LYMPHOCYTES NFR BLD: 18.1 % (ref 18–48)
MCH RBC QN AUTO: 29.4 PG (ref 27–31)
MCHC RBC AUTO-ENTMCNC: 33.9 G/DL (ref 32–36)
MCV RBC AUTO: 87 FL (ref 82–98)
MONOCYTES # BLD AUTO: 0.3 K/UL (ref 0.3–1)
MONOCYTES NFR BLD: 6.6 % (ref 4–15)
NEUTROPHILS # BLD AUTO: 3.6 K/UL (ref 1.8–7.7)
NEUTROPHILS NFR BLD: 71.7 % (ref 38–73)
NRBC BLD-RTO: 0 /100 WBC
PLATELET # BLD AUTO: 106 K/UL (ref 150–450)
PMV BLD AUTO: 9.6 FL (ref 9.2–12.9)
RBC # BLD AUTO: 4.01 M/UL (ref 4–5.4)
WBC # BLD AUTO: 5.03 K/UL (ref 3.9–12.7)

## 2023-07-10 PROCEDURE — 96372 THER/PROPH/DIAG INJ SC/IM: CPT | Performed by: EMERGENCY MEDICINE

## 2023-07-10 PROCEDURE — 63600175 PHARM REV CODE 636 W HCPCS: Performed by: EMERGENCY MEDICINE

## 2023-07-10 PROCEDURE — 83690 ASSAY OF LIPASE: CPT | Performed by: EMERGENCY MEDICINE

## 2023-07-10 PROCEDURE — 93010 ELECTROCARDIOGRAM REPORT: CPT | Mod: ,,, | Performed by: INTERNAL MEDICINE

## 2023-07-10 PROCEDURE — 93005 ELECTROCARDIOGRAM TRACING: CPT

## 2023-07-10 PROCEDURE — 93010 EKG 12-LEAD: ICD-10-PCS | Mod: ,,, | Performed by: INTERNAL MEDICINE

## 2023-07-10 PROCEDURE — 99285 EMERGENCY DEPT VISIT HI MDM: CPT | Mod: 25

## 2023-07-10 PROCEDURE — 36415 COLL VENOUS BLD VENIPUNCTURE: CPT | Performed by: EMERGENCY MEDICINE

## 2023-07-10 PROCEDURE — 80053 COMPREHEN METABOLIC PANEL: CPT | Performed by: EMERGENCY MEDICINE

## 2023-07-10 PROCEDURE — 85025 COMPLETE CBC W/AUTO DIFF WBC: CPT | Performed by: EMERGENCY MEDICINE

## 2023-07-10 RX ORDER — METOCLOPRAMIDE HYDROCHLORIDE 5 MG/ML
10 INJECTION INTRAMUSCULAR; INTRAVENOUS
Status: COMPLETED | OUTPATIENT
Start: 2023-07-11 | End: 2023-07-11

## 2023-07-10 RX ORDER — DIPHENHYDRAMINE HYDROCHLORIDE 50 MG/ML
25 INJECTION INTRAMUSCULAR; INTRAVENOUS
Status: COMPLETED | OUTPATIENT
Start: 2023-07-11 | End: 2023-07-11

## 2023-07-10 RX ORDER — HALOPERIDOL 5 MG/ML
5 INJECTION INTRAMUSCULAR
Status: COMPLETED | OUTPATIENT
Start: 2023-07-11 | End: 2023-07-11

## 2023-07-10 RX ORDER — HALOPERIDOL 5 MG/ML
5 INJECTION INTRAMUSCULAR
Status: COMPLETED | OUTPATIENT
Start: 2023-07-10 | End: 2023-07-10

## 2023-07-10 RX ADMIN — HALOPERIDOL LACTATE 5 MG: 5 INJECTION, SOLUTION INTRAMUSCULAR at 11:07

## 2023-07-11 VITALS
HEART RATE: 81 BPM | DIASTOLIC BLOOD PRESSURE: 120 MMHG | RESPIRATION RATE: 18 BRPM | TEMPERATURE: 98 F | SYSTOLIC BLOOD PRESSURE: 198 MMHG | WEIGHT: 126 LBS | OXYGEN SATURATION: 93 % | BODY MASS INDEX: 23.19 KG/M2 | HEIGHT: 62 IN

## 2023-07-11 LAB
ALBUMIN SERPL BCP-MCNC: 4.3 G/DL (ref 3.5–5.2)
ALP SERPL-CCNC: 333 U/L (ref 55–135)
ALT SERPL W/O P-5'-P-CCNC: 37 U/L (ref 10–44)
ANION GAP SERPL CALC-SCNC: 24 MMOL/L (ref 8–16)
AST SERPL-CCNC: 32 U/L (ref 10–40)
BILIRUB SERPL-MCNC: 1.4 MG/DL (ref 0.1–1)
BUN SERPL-MCNC: 33 MG/DL (ref 6–20)
CALCIUM SERPL-MCNC: 9.6 MG/DL (ref 8.7–10.5)
CHLORIDE SERPL-SCNC: 101 MMOL/L (ref 95–110)
CO2 SERPL-SCNC: 19 MMOL/L (ref 23–29)
CREAT SERPL-MCNC: 7.5 MG/DL (ref 0.5–1.4)
EST. GFR  (NO RACE VARIABLE): 7 ML/MIN/1.73 M^2
GLUCOSE SERPL-MCNC: 107 MG/DL (ref 70–110)
LIPASE SERPL-CCNC: 17 U/L (ref 4–60)
POTASSIUM SERPL-SCNC: 4.3 MMOL/L (ref 3.5–5.1)
PROT SERPL-MCNC: 9 G/DL (ref 6–8.4)
SODIUM SERPL-SCNC: 144 MMOL/L (ref 136–145)

## 2023-07-11 PROCEDURE — 96374 THER/PROPH/DIAG INJ IV PUSH: CPT

## 2023-07-11 PROCEDURE — 96372 THER/PROPH/DIAG INJ SC/IM: CPT | Performed by: EMERGENCY MEDICINE

## 2023-07-11 PROCEDURE — 63600175 PHARM REV CODE 636 W HCPCS: Performed by: EMERGENCY MEDICINE

## 2023-07-11 PROCEDURE — 96375 TX/PRO/DX INJ NEW DRUG ADDON: CPT

## 2023-07-11 RX ORDER — PROMETHAZINE HYDROCHLORIDE 25 MG/1
25 SUPPOSITORY RECTAL EVERY 6 HOURS PRN
Qty: 10 SUPPOSITORY | Refills: 0 | Status: ON HOLD | OUTPATIENT
Start: 2023-07-11 | End: 2023-08-17

## 2023-07-11 RX ORDER — AMLODIPINE BESYLATE 5 MG/1
10 TABLET ORAL
Status: DISCONTINUED | OUTPATIENT
Start: 2023-07-11 | End: 2023-07-11 | Stop reason: HOSPADM

## 2023-07-11 RX ORDER — ONDANSETRON 4 MG/1
4 TABLET, FILM COATED ORAL EVERY 6 HOURS PRN
Qty: 12 TABLET | Refills: 0 | Status: SHIPPED | OUTPATIENT
Start: 2023-07-11 | End: 2023-08-17 | Stop reason: SDUPTHER

## 2023-07-11 RX ORDER — HYDRALAZINE HYDROCHLORIDE 20 MG/ML
10 INJECTION INTRAMUSCULAR; INTRAVENOUS
Status: COMPLETED | OUTPATIENT
Start: 2023-07-11 | End: 2023-07-11

## 2023-07-11 RX ORDER — HYDRALAZINE HYDROCHLORIDE 25 MG/1
100 TABLET, FILM COATED ORAL
Status: DISCONTINUED | OUTPATIENT
Start: 2023-07-11 | End: 2023-07-11 | Stop reason: HOSPADM

## 2023-07-11 RX ADMIN — HYDRALAZINE HYDROCHLORIDE 10 MG: 20 INJECTION INTRAMUSCULAR; INTRAVENOUS at 12:07

## 2023-07-11 RX ADMIN — DIPHENHYDRAMINE HYDROCHLORIDE 25 MG: 50 INJECTION, SOLUTION INTRAMUSCULAR; INTRAVENOUS at 12:07

## 2023-07-11 RX ADMIN — METOCLOPRAMIDE 10 MG: 5 INJECTION, SOLUTION INTRAMUSCULAR; INTRAVENOUS at 12:07

## 2023-07-11 RX ADMIN — HALOPERIDOL LACTATE 5 MG: 5 INJECTION, SOLUTION INTRAMUSCULAR at 12:07

## 2023-07-11 NOTE — ED NOTES
"Tabby Howard presents to the ED with c/o generalized abdominal pain that has been persistent all day. Patient reported to the triage nurse that she saw her PCP today for some "Refills, but I do not have any meds at home for pain." Patient is thrashing back and forth in bed.  ALLEN ACHARYA  Verified patient's name and date of birth.     " Retinoid Dermatitis Normal Treatment: I recommended more frequent application of Cetaphil or CeraVe to the areas of dermatitis.

## 2023-07-11 NOTE — ED PROVIDER NOTES
Encounter Date: 7/10/2023       History     Chief Complaint   Patient presents with    Abdominal Pain     All day     HPI  33 y.o.    Co generalized abd pain all day  Has a h/o this but denies pshx to belly  Difficult to obtain history from  ESRD, on HD last session was Saturday  Sts compliant with it  BIBEMS    Review of patient's allergies indicates:   Allergen Reactions    Penicillins Hives     Past Medical History:   Diagnosis Date    ESRD on hemodialysis 2022    Gastritis 2022    EGD was 21    Gastroparesis 2022    has not had Emptying study    Heart failure with preserved ejection fraction 2022    EF 55% on 3/22    History of supraventricular tachycardia     Hyperkalemia 2022    Hypertensive emergency 2022    Sickle cell trait 2022    Type 2 diabetes mellitus      Past Surgical History:   Procedure Laterality Date     SECTION      x 3    COLONOSCOPY      COLONOSCOPY N/A 2022    Procedure: COLONOSCOPY;  Surgeon: Jagdeep Cedeno MD;  Location: [facility];  Service: Endoscopy;  Laterality: N/A;    ESOPHAGOGASTRODUODENOSCOPY N/A 10/18/2019    Procedure: ESOPHAGOGASTRODUODENOSCOPY (EGD);  Surgeon: Gianluca Mendez MD;  Location: [facility];  Service: Endoscopy;  Laterality: N/A;    ESOPHAGOGASTRODUODENOSCOPY N/A 2022    Procedure: EGD (ESOPHAGOGASTRODUODENOSCOPY);  Surgeon: Micky Paredes III, MD;  Location: [facility];  Service: Endoscopy;  Laterality: N/A;    ESOPHAGOGASTRODUODENOSCOPY N/A 2022    Procedure: EGD (ESOPHAGOGASTRODUODENOSCOPY);  Surgeon: Marcelo Zhong MD;  Location: [facility] ENDO;  Service: Endoscopy;  Laterality: N/A;    INSERTION, CATHETER, TUNNELED N/A 2023    Procedure: Insertion,catheter,tunneled;  Surgeon: Carlos Thurman Jr., MD;  Location: [facility] OR;  Service: General;  Laterality: N/A;    LAPAROSCOPIC CHOLECYSTECTOMY N/A 2022    Procedure: CHOLECYSTECTOMY, LAPAROSCOPIC;  Surgeon: Grey Perez MD;   Location: [facility] OR;  Service: General;  Laterality: N/A;    PLACEMENT OF DUAL-LUMEN VASCULAR CATHETER Left 07/12/2022    Procedure: INSERTION-CATHETER-JOSEPH;  Surgeon: Dionte Gan MD;  Location: [facility] OR;  Service: General;  Laterality: Left;    PLACEMENT OF DUAL-LUMEN VASCULAR CATHETER Right 07/26/2022    Procedure: INSERTION-CATHETER-Hemosplit;  Surgeon: Dionte Gan MD;  Location: [facility] OR;  Service: General;  Laterality: Right;     Family History   Problem Relation Age of Onset    Diabetes Mother     Diabetes Father      Social History     Tobacco Use    Smoking status: Never    Smokeless tobacco: Never   Substance Use Topics    Alcohol use: Not Currently    Drug use: No     Review of Systems  All systems were reviewed/examined and were negative except as noted in the HPI.    Limited by patient cooperation  Physical Exam     Initial Vitals [07/10/23 2330]   BP Pulse Resp Temp SpO2   (!) 198/162 86 (!) 28 97.9 °F (36.6 °C) 100 %      MAP       --         Physical Exam    General: the patient is awake, alert, and writhing on bed  Head: normocephalic and atraumatic, sclera are clear  Neck: supple without meningismus  Chest: clear to auscultation bilaterally, no respiratory distress  Heart: regular rate and rhythm  ABD soft, diffuse abd tenderness, nondistended, no peritoneal signs  Extremities: warm and well perfused  Skin: warm and dry  Neuro: awake, alert, moving all extremities    ED Course   Procedures  Labs Reviewed   CBC W/ AUTO DIFFERENTIAL - Abnormal; Notable for the following components:       Result Value    Hemoglobin 11.8 (*)     Hematocrit 34.8 (*)     RDW 16.8 (*)     Platelets 106 (*)     Immature Granulocytes 0.6 (*)     Lymph # 0.9 (*)     All other components within normal limits   COMPREHENSIVE METABOLIC PANEL - Abnormal; Notable for the following components:    CO2 19 (*)     BUN 33 (*)     Creatinine 7.5 (*)     Total Protein 9.0 (*)     Total Bilirubin 1.4 (*)     Alkaline Phosphatase  333 (*)     eGFR 7 (*)     Anion Gap 24 (*)     All other components within normal limits   LIPASE        ECG Results              EKG 12-lead (In process)  Result time 07/11/23 14:14:03      In process by Interface, Lab In [facility] (07/11/23 14:14:03)                   Narrative:    Test Reason : R10.9,    Vent. Rate : 085 BPM     Atrial Rate : 085 BPM     P-R Int : 180 ms          QRS Dur : 084 ms      QT Int : 396 ms       P-R-T Axes : 040 -35 114 degrees     QTc Int : 471 ms    Normal sinus rhythm  Possible Left atrial enlargement  Left axis deviation  LVH with repolarization abnormality  Abnormal ECG  When compared with ECG of 03-JUN-2023 13:29,  No significant change was found    Referred By: [email or web address]   SELF           Confirmed By:                                   Imaging Results              CT Abdomen Pelvis  Without Contrast (Final result)  Result time 07/11/23 01:10:44      Final result by Libertad Juarez MD (07/11/23 01:10:44)                   Impression:      No convincing acute abnormality in the abdomen or pelvis in this patient with abdominal pain.  Appendix appears normal.    Hepatosplenomegaly.  No appreciable focal lesions.    Punctate calcifications in the renal collecting systems bilaterally may represent vascular calcifications or nonobstructing calculi bilaterally and in retrospect these are stable.  No hydronephrosis/ureterectasis.  Limited visualization of the ureters.    Cholecystectomy.    Cardiomegaly with pericardial effusion stable since prior up to 18 mm in thickness as imaged.    Improved anasarca with minor subcutaneous edematous changes now noted.      Electronically signed by: Libertad Juarez  Date:    07/11/2023  Time:    01:10               Narrative:    EXAMINATION:  CT ABDOMEN PELVIS WITHOUT CONTRAST    CLINICAL HISTORY:  Abdominal pain, acute, nonlocalized;    TECHNIQUE:  Low dose axial images, sagittal and coronal reformations were obtained from the lung bases to the  pubic symphysis without oral or IV contrast.    COMPARISON:  CT abdomen pelvis 05/15/2023, 05/03/2023    FINDINGS:  Abdomen:    - Lung bases: There is mild scarring or atelectasis in the lung bases and a few scattered micro nodules in the posterior left lower lobe periphery.  The heart is enlarged there is a pericardial effusion again noted up to 18 mm in thickness along the right atrium.  Catheter tip noted at the atrial caval junction.    The lack of intravenous contrast limits evaluation of the solid organs for focal lesions.    - Liver: The liver appears homogeneous and borderline prominent measuring up to 17 cm and stable.    - Gallbladder: Cholecystectomy    - Bile Ducts: No convincing evidence of intra or extra hepatic biliary ductal dilation.    - Spleen: Mildly prominent measuring 14 cm in sagittal length.    - Kidneys: Calcifications in the renal pelves are attributed to vascular calcifications and appear stable.  Possibility of a punctate nonobstructing calculus on the right is considered.  The ureters are not reliably followed down to the pelvis which limits evaluation for nonobstructing calculi.  Renal cortices appear homogeneous is imaged.    - Adrenals: Unremarkable.    - Pancreas: No mass or peripancreatic fat stranding.    - Retroperitoneum:  No significant adenopathy.    - Vascular: There is no abdominal aortic aneurysm.  Extensive tubular vascular calcifications are noted throughout the splanchnic arteries and pelvic arteries.    - Abdominal wall:  Slight strandy changes in the subcutaneous fat likely represent edematous changes however they are decreased to a significant degree as compared to the prior CT 05/15/2023.    Pelvis:    Bladder is normal as imaged with no visible calculi.  Uterus and adnexa are unremarkable without IV contrast.  There is no evidence of no pelvic mass, adenopathy, or free fluid.    Bowel/Mesentery:    The appendix is normal.  There is no evidence of bowel obstruction or  mesenteric adenopathy or inflammation.  Stomach is within normal limits.    Bones: There are no acute osseous abnormality and no suspicious lytic or blastic lesion.                                       Medications   haloperidol lactate injection 5 mg (5 mg Intramuscular Given 7/10/23 2339)   metoclopramide HCl injection 10 mg (10 mg Intravenous Given 7/11/23 0029)   diphenhydrAMINE injection 25 mg (25 mg Intravenous Given 7/11/23 0026)   haloperidol lactate injection 5 mg (5 mg Intramuscular Given 7/11/23 0027)   hydrALAZINE injection 10 mg (10 mg Intravenous Given 7/11/23 0045)      Medical Decision Making:    This is an emergent evaluation of a patient presenting to the ED.  Nursing notes were reviewed.  I personally reviewed, read, and interpreted the ECG and any monitoring strips.  ECG: normal EKG, normal sinus rhythm, unchanged from previous tracings Compared with prior if available.  Read and interpreted by me independently.      I reviewed radiology images personally along with interpretations.  Imaging reviewed by me (CT scan abdomen), personally and independently, and prelims if available.  No acute/emergent pathologic findings noted on radiologic studies ordered.    I personally reviewed and interpreted the laboratory results.  Known ESRD, no critical e'lye abnl    I decided to obtain and review old medical records, which showed: multiple similar    Improved  BP meds (hasnt been taking due to vomiting)    Ok for outpatient fu    Adolfo Bland MD, NORBERT    Critical Care Time    Critical care time was provided for 30 minutes exclusive of other billable procedures and teaching time for the treatment of  GI/renal eval   where the potential for death, shock, or further decline was possible.  Critical care time can include documentation, discussion with consultants, developing a care plan, as well as direct patient care.    Osbaldo Bland MD                            Clinical Impression:   Final  diagnoses:  [R10.9] Abdominal pain  [R10.84] Recurrent generalized abdominal pain (Primary)        ED Disposition Condition    Discharge Stable          ED Prescriptions       Medication Sig Dispense Start Date End Date Auth. Provider    ondansetron (ZOFRAN) 4 MG tablet Take 1 tablet (4 mg total) by mouth every 6 (six) hours as needed for Nausea. 12 tablet 7/11/2023 -- Osbaldo Bland MD    promethazine (PHENERGAN) 25 MG suppository Place 1 suppository (25 mg total) rectally every 6 (six) hours as needed for Nausea. 10 suppository 7/11/2023 -- Osbaldo Bland MD          Follow-up Information       Follow up With Specialties Details Why Contact Info    Melony Kim, CARO Nurse Practitioner Schedule an appointment as soon as possible for a visit   [address] [facility] [address]  [phone]      [facility] Schedule an appointment as soon as possible for a visit   [address] [facility] [address]          Discharged to home in stable condition, return to ED warnings given, follow up and patient care instructions given.      Adolfo Bland MD, NORBERT, FACEP  Department of Emergency Medicine       Osbaldo Bland MD  07/12/23 0894

## 2023-07-17 PROBLEM — E11.10 DKA (DIABETIC KETOACIDOSIS): Status: RESOLVED | Noted: 2021-04-23 | Resolved: 2023-07-17

## 2023-07-31 ENCOUNTER — HOSPITAL ENCOUNTER (EMERGENCY)
Facility: HOSPITAL | Age: 33
Discharge: ANOTHER HOSPITAL | End: 2023-08-01
Attending: FAMILY MEDICINE
Payer: MEDICAID

## 2023-07-31 DIAGNOSIS — I10 HTN (HYPERTENSION): ICD-10-CM

## 2023-07-31 DIAGNOSIS — Z99.2 ESRD (END STAGE RENAL DISEASE) ON DIALYSIS: Primary | ICD-10-CM

## 2023-07-31 DIAGNOSIS — R10.9 ABDOMINAL PAIN: ICD-10-CM

## 2023-07-31 DIAGNOSIS — N18.6 ESRD (END STAGE RENAL DISEASE) ON DIALYSIS: Primary | ICD-10-CM

## 2023-07-31 DIAGNOSIS — D64.9 ANEMIA, UNSPECIFIED TYPE: ICD-10-CM

## 2023-07-31 LAB
ABO + RH BLD: NORMAL
ACETONE BLD-MCNC: ABNORMAL MG/DL
ALBUMIN SERPL BCP-MCNC: 3.6 G/DL (ref 3.5–5.2)
ALP SERPL-CCNC: 197 U/L (ref 55–135)
ALT SERPL W/O P-5'-P-CCNC: 22 U/L (ref 10–44)
ANION GAP SERPL CALC-SCNC: 24 MMOL/L (ref 8–16)
AST SERPL-CCNC: 28 U/L (ref 10–40)
BASOPHILS NFR BLD: 0 % (ref 0–1.9)
BILIRUB SERPL-MCNC: 3.7 MG/DL (ref 0.1–1)
BLD GP AB SCN CELLS X3 SERPL QL: NORMAL
BLD PROD TYP BPU: NORMAL
BLOOD UNIT EXPIRATION DATE: NORMAL
BLOOD UNIT TYPE CODE: 6200
BLOOD UNIT TYPE: NORMAL
BUN SERPL-MCNC: 39 MG/DL (ref 6–20)
CALCIUM SERPL-MCNC: 8.8 MG/DL (ref 8.7–10.5)
CHLORIDE SERPL-SCNC: 101 MMOL/L (ref 95–110)
CO2 SERPL-SCNC: 18 MMOL/L (ref 23–29)
CODING SYSTEM: NORMAL
CREAT SERPL-MCNC: 6.8 MG/DL (ref 0.5–1.4)
CROSSMATCH INTERPRETATION: NORMAL
DIFFERENTIAL METHOD: ABNORMAL
DISPENSE STATUS: NORMAL
EOSINOPHIL NFR BLD: 0 % (ref 0–8)
ERYTHROCYTE [DISTWIDTH] IN BLOOD BY AUTOMATED COUNT: 17.2 % (ref 11.5–14.5)
EST. GFR  (NO RACE VARIABLE): 7.6 ML/MIN/1.73 M^2
GLUCOSE SERPL-MCNC: 210 MG/DL (ref 70–110)
HCG INTACT+B SERPL-ACNC: <1.2 MIU/ML
HCT VFR BLD AUTO: 19.5 % (ref 37–48.5)
HGB BLD-MCNC: 6.7 G/DL (ref 12–16)
IMM GRANULOCYTES # BLD AUTO: ABNORMAL K/UL
IMM GRANULOCYTES NFR BLD AUTO: ABNORMAL %
LACTATE SERPL-SCNC: 2.7 MMOL/L (ref 0.5–2.2)
LIPASE SERPL-CCNC: 10 U/L (ref 4–60)
LYMPHOCYTES NFR BLD: 13 % (ref 18–48)
MAGNESIUM SERPL-MCNC: 1.9 MG/DL (ref 1.6–2.6)
MCH RBC QN AUTO: 29.8 PG (ref 27–31)
MCHC RBC AUTO-ENTMCNC: 34.4 G/DL (ref 32–36)
MCV RBC AUTO: 87 FL (ref 82–98)
MONOCYTES NFR BLD: 4 % (ref 4–15)
NEUTROPHILS NFR BLD: 83 % (ref 38–73)
NRBC BLD-RTO: 1 /100 WBC
NUM UNITS TRANS PACKED RBC: NORMAL
OB PNL STL: NEGATIVE
PLATELET # BLD AUTO: 58 K/UL (ref 150–450)
PMV BLD AUTO: 9.7 FL (ref 9.2–12.9)
POTASSIUM SERPL-SCNC: 3.8 MMOL/L (ref 3.5–5.1)
PROT SERPL-MCNC: 7.2 G/DL (ref 6–8.4)
RBC # BLD AUTO: 2.25 M/UL (ref 4–5.4)
SODIUM SERPL-SCNC: 143 MMOL/L (ref 136–145)
SPECIMEN OUTDATE: NORMAL
WBC # BLD AUTO: 4.75 K/UL (ref 3.9–12.7)

## 2023-07-31 PROCEDURE — 36415 COLL VENOUS BLD VENIPUNCTURE: CPT | Performed by: FAMILY MEDICINE

## 2023-07-31 PROCEDURE — 85025 COMPLETE CBC W/AUTO DIFF WBC: CPT | Performed by: EMERGENCY MEDICINE

## 2023-07-31 PROCEDURE — 84702 CHORIONIC GONADOTROPIN TEST: CPT | Performed by: FAMILY MEDICINE

## 2023-07-31 PROCEDURE — 83735 ASSAY OF MAGNESIUM: CPT | Performed by: FAMILY MEDICINE

## 2023-07-31 PROCEDURE — 83690 ASSAY OF LIPASE: CPT | Performed by: EMERGENCY MEDICINE

## 2023-07-31 PROCEDURE — 63600175 PHARM REV CODE 636 W HCPCS: Performed by: FAMILY MEDICINE

## 2023-07-31 PROCEDURE — 96376 TX/PRO/DX INJ SAME DRUG ADON: CPT

## 2023-07-31 PROCEDURE — 25500020 PHARM REV CODE 255: Performed by: FAMILY MEDICINE

## 2023-07-31 PROCEDURE — 25000003 PHARM REV CODE 250: Performed by: FAMILY MEDICINE

## 2023-07-31 PROCEDURE — P9016 RBC LEUKOCYTES REDUCED: HCPCS | Performed by: FAMILY MEDICINE

## 2023-07-31 PROCEDURE — 96374 THER/PROPH/DIAG INJ IV PUSH: CPT

## 2023-07-31 PROCEDURE — 93010 ELECTROCARDIOGRAM REPORT: CPT | Mod: ,,, | Performed by: INTERNAL MEDICINE

## 2023-07-31 PROCEDURE — 93005 ELECTROCARDIOGRAM TRACING: CPT

## 2023-07-31 PROCEDURE — 82272 OCCULT BLD FECES 1-3 TESTS: CPT | Performed by: FAMILY MEDICINE

## 2023-07-31 PROCEDURE — 93010 EKG 12-LEAD: ICD-10-PCS | Mod: ,,, | Performed by: INTERNAL MEDICINE

## 2023-07-31 PROCEDURE — 71045 XR CHEST AP PORTABLE: ICD-10-PCS | Mod: 26,,, | Performed by: RADIOLOGY

## 2023-07-31 PROCEDURE — 71045 X-RAY EXAM CHEST 1 VIEW: CPT | Mod: TC

## 2023-07-31 PROCEDURE — 74177 CT ABD & PELVIS W/CONTRAST: CPT | Mod: 26,,, | Performed by: RADIOLOGY

## 2023-07-31 PROCEDURE — 86900 BLOOD TYPING SEROLOGIC ABO: CPT | Performed by: FAMILY MEDICINE

## 2023-07-31 PROCEDURE — 74177 CT ABDOMEN PELVIS WITH CONTRAST: ICD-10-PCS | Mod: 26,,, | Performed by: RADIOLOGY

## 2023-07-31 PROCEDURE — 82009 KETONE BODYS QUAL: CPT | Performed by: FAMILY MEDICINE

## 2023-07-31 PROCEDURE — 83605 ASSAY OF LACTIC ACID: CPT | Performed by: EMERGENCY MEDICINE

## 2023-07-31 PROCEDURE — 74177 CT ABD & PELVIS W/CONTRAST: CPT | Mod: TC

## 2023-07-31 PROCEDURE — 71045 X-RAY EXAM CHEST 1 VIEW: CPT | Mod: 26,,, | Performed by: RADIOLOGY

## 2023-07-31 PROCEDURE — 87040 BLOOD CULTURE FOR BACTERIA: CPT | Performed by: FAMILY MEDICINE

## 2023-07-31 PROCEDURE — 36430 TRANSFUSION BLD/BLD COMPNT: CPT

## 2023-07-31 PROCEDURE — 86920 COMPATIBILITY TEST SPIN: CPT | Performed by: FAMILY MEDICINE

## 2023-07-31 PROCEDURE — 80053 COMPREHEN METABOLIC PANEL: CPT | Performed by: EMERGENCY MEDICINE

## 2023-07-31 RX ORDER — HYDROMORPHONE HYDROCHLORIDE 1 MG/ML
0.5 INJECTION, SOLUTION INTRAMUSCULAR; INTRAVENOUS; SUBCUTANEOUS
Status: COMPLETED | OUTPATIENT
Start: 2023-07-31 | End: 2023-07-31

## 2023-07-31 RX ORDER — HYOSCYAMINE SULFATE 0.12 MG/1
0.12 TABLET SUBLINGUAL
Status: COMPLETED | OUTPATIENT
Start: 2023-07-31 | End: 2023-07-31

## 2023-07-31 RX ORDER — HYDROMORPHONE HYDROCHLORIDE 1 MG/ML
0.5 INJECTION, SOLUTION INTRAMUSCULAR; INTRAVENOUS; SUBCUTANEOUS
Status: COMPLETED | OUTPATIENT
Start: 2023-07-31 | End: 2023-08-01

## 2023-07-31 RX ORDER — ONDANSETRON 2 MG/ML
4 INJECTION INTRAMUSCULAR; INTRAVENOUS
Status: COMPLETED | OUTPATIENT
Start: 2023-07-31 | End: 2023-07-31

## 2023-07-31 RX ORDER — HYDROCODONE BITARTRATE AND ACETAMINOPHEN 500; 5 MG/1; MG/1
TABLET ORAL
Status: DISCONTINUED | OUTPATIENT
Start: 2023-07-31 | End: 2023-08-01 | Stop reason: HOSPADM

## 2023-07-31 RX ADMIN — IOHEXOL 75 ML: 350 INJECTION, SOLUTION INTRAVENOUS at 09:07

## 2023-07-31 RX ADMIN — HYOSCYAMINE SULFATE 0.12 MG: 0.12 TABLET, ORALLY DISINTEGRATING SUBLINGUAL at 07:07

## 2023-07-31 RX ADMIN — ONDANSETRON HYDROCHLORIDE 4 MG: 2 SOLUTION INTRAMUSCULAR; INTRAVENOUS at 07:07

## 2023-07-31 RX ADMIN — HYDROMORPHONE HYDROCHLORIDE 0.5 MG: 1 INJECTION, SOLUTION INTRAMUSCULAR; INTRAVENOUS; SUBCUTANEOUS at 07:07

## 2023-07-31 NOTE — ED PROVIDER NOTES
Encounter Date: 7/31/2023       History     Chief Complaint   Patient presents with    Abdominal Pain     Pt presents with abdominal and flank pain onset yesterday. Pt receives dialysis T/Tr/Sa. Anuric. Afebrile. Pt reports multiple episodes of vomiting as well.      Patient comes our facility with complaints of lower abdominal pain affecting her suprapubic area as well as both lower quadrants.  Patient claims that the pain is sharp in nature.  It started yesterday and has been continuous.  Patient has had a history of chronic abdominal pain with known history of gastroparesis.  Patient states this pain is different as typically her pain is crampy in nature and this is sharp.  Additionally, her usual abdominal pain is epigastric and this was lower abdominal.  Triage listed flank pain as a chief complaint but patient describes bilateral paraspinal lumbar pain rather than flank pain.  Patient is a dialysis patient and received this on Tuesday, Thursday, Saturday.  Last dialysis 2 days prior.  Patient's nephrologist is Mohsen Sommer.  PCP is out of [place].  Patient denies any associated fevers or chills.  She has had some nausea with emesis mildly improved Zofran.  Patient denies any diarrhea.  She had a normal bowel today.  Patient denies any rectal pain.  Patient denies any gross blood per rectum.  Patient does not make any urine due to her end-stage renal disease.  Patient denies any chest pains, dyspnea, consumption of unusual or suspect foods.      Review of patient's allergies indicates:   Allergen Reactions    Penicillins Hives     Past Medical History:   Diagnosis Date    ESRD on hemodialysis 04/12/2022    Gastritis 12/2022    EGD was 12/5/22    Gastroparesis 04/12/2022    has not had Emptying study    Heart failure with preserved ejection fraction 04/12/2022    EF 55% on 3/22    History of supraventricular tachycardia     Hyperkalemia 07/07/2022    Hypertensive emergency 07/08/2022    Sickle cell trait  2022    Type 2 diabetes mellitus      Past Surgical History:   Procedure Laterality Date     SECTION      x 3    COLONOSCOPY      COLONOSCOPY N/A 2022    Procedure: COLONOSCOPY;  Surgeon: Jagdeep Cedeno MD;  Location: [facility];  Service: Endoscopy;  Laterality: N/A;    ESOPHAGOGASTRODUODENOSCOPY N/A 10/18/2019    Procedure: ESOPHAGOGASTRODUODENOSCOPY (EGD);  Surgeon: Gianluca Mendez MD;  Location: [facility];  Service: Endoscopy;  Laterality: N/A;    ESOPHAGOGASTRODUODENOSCOPY N/A 2022    Procedure: EGD (ESOPHAGOGASTRODUODENOSCOPY);  Surgeon: Micky Paredes III, MD;  Location: [facility];  Service: Endoscopy;  Laterality: N/A;    ESOPHAGOGASTRODUODENOSCOPY N/A 2022    Procedure: EGD (ESOPHAGOGASTRODUODENOSCOPY);  Surgeon: Marcelo Zhong MD;  Location: [facility] END[facility];  Service: Endoscopy;  Laterality: N/A;    INSERTION, CATHETER, TUNNELED N/A 2023    Procedure: Insertion,catheter,tunneled;  Surgeon: Carlos Thurman Jr., MD;  Location: [facility] OR;  Service: General;  Laterality: N/A;    LAPAROSCOPIC CHOLECYSTECTOMY N/A 2022    Procedure: CHOLECYSTECTOMY, LAPAROSCOPIC;  Surgeon: Grey Perez MD;  Location: [facility] OR;  Service: General;  Laterality: N/A;    PLACEMENT OF DUAL-LUMEN VASCULAR CATHETER Left 2022    Procedure: INSERTION-CATHETER-JOSEPH;  Surgeon: Dionte Gan MD;  Location: [facility] OR;  Service: General;  Laterality: Left;    PLACEMENT OF DUAL-LUMEN VASCULAR CATHETER Right 2022    Procedure: INSERTION-CATHETER-Hemosplit;  Surgeon: Dionte Gan MD;  Location: [facility] OR;  Service: General;  Laterality: Right;     Family History   Problem Relation Age of Onset    Diabetes Mother     Diabetes Father      Social History     Tobacco Use    Smoking status: Never    Smokeless tobacco: Never   Substance Use Topics    Alcohol use: Not Currently    Drug use: No     Review of Systems   Constitutional:  Negative for chills, fatigue and fever.   Respiratory:   Negative for shortness of breath.    Cardiovascular:  Negative for chest pain and palpitations.   Gastrointestinal:  Positive for abdominal pain, nausea and vomiting. Negative for abdominal distention, anal bleeding, blood in stool, constipation, diarrhea and rectal pain.   Musculoskeletal:  Positive for back pain.   All other systems reviewed and are negative.      Physical Exam     Initial Vitals [07/31/23 1710]   BP Pulse Resp Temp SpO2   (!) 158/89 90 (!) 22 98.8 °F (37.1 °C) 100 %      MAP       --         Physical Exam    Nursing note and vitals reviewed.  Constitutional: She appears well-developed and well-nourished. She is not diaphoretic. She appears distressed.   Patient moderately distressed or abdominal pain.  Patient is writhing in bed.   HENT:   Head: Normocephalic and atraumatic.   Nose: Nose normal.   Mouth/Throat: Oropharynx is clear and moist.   Eyes: Conjunctivae and EOM are normal. Right eye exhibits no discharge. Left eye exhibits no discharge. No scleral icterus.   Neck: Neck supple.   Normal range of motion.  Cardiovascular:  Normal rate, regular rhythm, normal heart sounds and intact distal pulses.           Pulmonary/Chest: Breath sounds normal. No respiratory distress. She has no wheezes.   Abdominal: Abdomen is soft. Bowel sounds are normal. She exhibits no distension and no mass. There is abdominal tenderness.   Patient actually has diffuse abdominal tenderness with mild guarding and no rebound.  Abdomen is reportedly more tender to her suprapubic area, left and right lower quadrants versus epigastric area. There is guarding. There is no rebound.   Genitourinary: Rectum:      Guaiac result negative.   Guaiac negative stool.    Genitourinary Comments: No external hemorrhoids.  No rectal lesions.  No gross blood on RAMESH.     Musculoskeletal:         General: No tenderness or edema. Normal range of motion.      Cervical back: Normal range of motion and neck supple.     Lymphadenopathy:      She has no cervical adenopathy.   Neurological: She is alert and oriented to person, place, and time. She has normal strength. GCS score is 15. GCS eye subscore is 4. GCS verbal subscore is 5. GCS motor subscore is 6.   Skin: Skin is warm and dry. Capillary refill takes less than 2 seconds. No rash noted. No erythema.   Psychiatric: She has a normal mood and affect. Her behavior is normal. Judgment and thought content normal.         ED Course   Procedures  Labs Reviewed   CBC W/ AUTO DIFFERENTIAL - Abnormal; Notable for the following components:       Result Value    RBC 2.25 (*)     Hemoglobin 6.7 (*)     Hematocrit 19.5 (*)     RDW 17.2 (*)     Platelets 58 (*)     nRBC 1 (*)     Gran % 83.0 (*)     Lymph % 13.0 (*)     All other components within normal limits    Narrative:       result(s) called and verbal readback obtained from     rocky mccoy @ [address] [facility] by [facility] 07/31/2023 18:41   COMPREHENSIVE METABOLIC PANEL - Abnormal; Notable for the following components:    CO2 18 (*)     Glucose 210 (*)     BUN 39 (*)     Creatinine 6.8 (*)     Total Bilirubin 3.7 (*)     Alkaline Phosphatase 197 (*)     eGFR 7.6 (*)     Anion Gap 24 (*)     All other components within normal limits   LACTIC ACID, PLASMA - Abnormal; Notable for the following components:    Lactate (Lactic Acid) 2.7 (*)     All other components within normal limits   ACETONE - Abnormal; Notable for the following components:    Acetone, Bld Large (*)     All other components within normal limits   CULTURE, BLOOD   CULTURE, BLOOD   LIPASE   HCG, QUANTITATIVE    Narrative:     Release to patient->Immediate   MAGNESIUM   OCCULT BLOOD X 1, STOOL   TYPE & SCREEN   PREPARE RBC SOFT     EKG Readings: (Independently Interpreted)   Normal sinus rhythm with ventricular rate of 91.  No ST elevations or depressions.       Imaging Results              CT Abdomen Pelvis With Contrast (In process)                      X-Ray Chest AP Portable (Final result)  Result time  23 18:58:07      Final result by Maurice Meadows MD (23 18:58:07)                   Impression:      1. Stable cardiomegaly.  Double-lumen central line unchanged in position.      Electronically signed by: Maurice Meadows MD  Date:    2023  Time:    18:58               Narrative:    EXAMINATION:  XR CHEST AP PORTABLE    CLINICAL HISTORY:  Essential (primary) hypertension    TECHNIQUE:  Single frontal portable view of the chest was performed.    COMPARISON:  Chest x-ray dated 2023    FINDINGS:  There is no acute abnormality or change in the appearance of the chest compared to the recent prior study.  There is stable cardiomegaly.  The lungs are clear without infiltrate or mass.  Pulmonary vascularity is normal.  There is no pleural effusion or pneumothorax.  There is a double lumen central line terminating in the superior vena cava, unchanged.                                    X-Rays:   Independently Interpreted Readings:   Other Readings:  PROCEDURE INFORMATION:  Exam: CT Abdomen And Pelvis With Contrast  Exam date and time: 2023 9:14 PM  Age: 33 years old  Clinical indication: Abdominal pain; Prior surgery; Surgery date: 6+ months; Surgery type:  Cholecystectomy, , colonoscopy, egd; Patient HX: Generalized abd pain, vomiting, PT on  dialysis, HX gastritis  TECHNIQUE:  Imaging protocol: Computed tomography of the abdomen and pelvis with contrast.  Contrast material: [id number]; Contrast volume: 75 ml; Contrast route: INTRAVENOUS (IV);  COMPARISON:  CT ABDOMEN PELVIS WITHOUT CONTRAST 2023 12:06 AM  FINDINGS:  Heart: Stable pericardial effusion.  Liver: Unremarkable.  Gallbladder and bile ducts: Cholecystectomy.  Pancreas: Unremarkable.  Spleen: Mild splenomegaly.  Adrenal glands: Normal. No mass.  Kidneys and ureters: Atrophic. No hydronephrosis.  Stomach and bowel: No obstruction. Thickening versus under distention of the colon.  Appendix: No evidence of acute  appendicitis.  Intraperitoneal space: No free air. No abscess. No ascites.  Vasculature: Moderate atherosclerosis. No aneurysm.  Lymph nodes: No significant adenopathy.  Urinary bladder: Mild wall thickening vs under distention.  Reproductive: Unremarkable.  Bones/joints: No acute fracture.  Soft tissues: Mild anasarca.  IMPRESSION:  1. Motion limited study.  2. No evidence of acute intrabdominal pathology.  3. Non-emergent findings, as above.  AZUL MARTINEZ Accession: [id number] MRN: [id number]  Preliminary Radiology Report   (QA) DISCREPANCY?  If there is a discrepancy between the preliminary and final interpretation, please notify [facility] via [email or web address].  If you do not have access to our QA portal, call our QA team at [phone]  CONFIDENTIALITY STATEMENT  This report is intended only for the use of the referring physician, and only in accordance with law, If you received this in error, call [phone]  Page 2 of 2  Thank you for allowing us to participate in the care of your patient.  Dictated and Authenticated by: Angelo Montejo MD  07/31/2023 9:40 PM Central Time ([place] & [place]    Medications   0.9%  NaCl infusion (for blood administration) (has no administration in time range)   ondansetron injection 4 mg (4 mg Intravenous Given 7/31/23 1914)   HYDROmorphone injection 0.5 mg (0.5 mg Intravenous Given 7/31/23 1914)   hyoscyamine ODT 0.125 mg (0.125 mg Oral Given 7/31/23 1915)   iohexoL (OMNIPAQUE 350) injection 75 mL (75 mLs Intravenous Given 7/31/23 2116)   HYDROmorphone injection 0.5 mg (0.5 mg Intravenous Given 8/1/23 0013)     Medical Decision Making:   Initial Assessment:   Diverticulitis, active GI bleed, intra-abdominal infection.  Peritonitis, chronic abdominal pain, malingering, ischemic bowel pain  ED Management:  Patient arrives with acute onset abdominal pain starting 1 day before ER arrival.  Patient does have history of chronic abdominal pain however this reportedly is  different in that it is sharp in nature and affecting her lower abdomen were as her normal abdominal pain is crampy and Effexor epigastric area.  Patient is on scheduled for her dialysis having her last dialysis session 2 days prior.  The patient is not exhibit any signs of volume overload.  Patient has had nausea with emesis.  Bowel movements have been normal.  Patient reports no blood per rectum.  There was no gross blood on RAMESH.  Stool guaiac was negative for occult blood.  Patient has had a drop in hemoglobin from 11 8-6.7 over the last 3 weeks.  Additionally, patient appears to have chronic metabolic acidosis.  Serum CO2 is 18 on.  Lactic acid 2.7.    Due to patient's recent acute blood loss of unknown cause and her need for dialysis on 08/01/2023 in addition to our inability to admit the patient to our facility as we have no hospital beds available, the patient was transferred to an outside facility.  Patient was accepted for admission to an outside hospital.  Patient was transfused 1 unit of packed red blood cells in our ER.  Pain was controlled during ER stay as well.    Total critical care time 40 minutes to include patient evaluation and treatment, laboratory irritation, radiologic interpretation comment consultation for transfer.                              Clinical Impression:   Final diagnoses:  [R10.9] Abdominal pain  [I10] HTN (hypertension)  [N18.6, Z99.2] ESRD (end stage renal disease) on dialysis (Primary)  [D64.9] Anemia, unspecified type        ED Disposition Condition    Transfer to Another Facility Stable                Ben Wright MD  08/01/23 [id number]

## 2023-08-01 ENCOUNTER — HOSPITAL ENCOUNTER (OUTPATIENT)
Facility: HOSPITAL | Age: 33
LOS: 1 days | Discharge: HOME | End: 2023-08-03
Attending: STUDENT IN AN ORGANIZED HEALTH CARE EDUCATION/TRAINING PROGRAM | Admitting: HOSPITALIST
Payer: MEDICAID

## 2023-08-01 VITALS
HEIGHT: 62 IN | OXYGEN SATURATION: 100 % | BODY MASS INDEX: 20.61 KG/M2 | HEART RATE: 80 BPM | DIASTOLIC BLOOD PRESSURE: 97 MMHG | TEMPERATURE: 98 F | SYSTOLIC BLOOD PRESSURE: 152 MMHG | WEIGHT: 112 LBS | RESPIRATION RATE: 18 BRPM

## 2023-08-01 DIAGNOSIS — N18.6 ESRD (END STAGE RENAL DISEASE): ICD-10-CM

## 2023-08-01 DIAGNOSIS — R07.9 CHEST PAIN: ICD-10-CM

## 2023-08-01 DIAGNOSIS — N18.6 ANEMIA DUE TO CHRONIC KIDNEY DISEASE, ON CHRONIC DIALYSIS: Primary | ICD-10-CM

## 2023-08-01 DIAGNOSIS — R10.9 ABDOMINAL PAIN: ICD-10-CM

## 2023-08-01 DIAGNOSIS — Z99.2 ANEMIA DUE TO CHRONIC KIDNEY DISEASE, ON CHRONIC DIALYSIS: Primary | ICD-10-CM

## 2023-08-01 DIAGNOSIS — T82.42XA DISPLACEMENT OF VASCULAR DIALYSIS CATHETER, INITIAL ENCOUNTER: ICD-10-CM

## 2023-08-01 DIAGNOSIS — D63.1 ANEMIA DUE TO CHRONIC KIDNEY DISEASE, ON CHRONIC DIALYSIS: Primary | ICD-10-CM

## 2023-08-01 LAB
ANION GAP SERPL CALC-SCNC: 16 MMOL/L (ref 8–16)
BASOPHILS # BLD AUTO: 0.02 K/UL (ref 0–0.2)
BASOPHILS NFR BLD: 0.5 % (ref 0–1.9)
BUN SERPL-MCNC: 43 MG/DL (ref 6–20)
CALCIUM SERPL-MCNC: 8.1 MG/DL (ref 8.7–10.5)
CHLORIDE SERPL-SCNC: 103 MMOL/L (ref 95–110)
CO2 SERPL-SCNC: 23 MMOL/L (ref 23–29)
CREAT SERPL-MCNC: 7.4 MG/DL (ref 0.5–1.4)
DIFFERENTIAL METHOD: ABNORMAL
EOSINOPHIL # BLD AUTO: 0 K/UL (ref 0–0.5)
EOSINOPHIL NFR BLD: 1 % (ref 0–8)
ERYTHROCYTE [DISTWIDTH] IN BLOOD BY AUTOMATED COUNT: 17.2 % (ref 11.5–14.5)
EST. GFR  (NO RACE VARIABLE): 6.9 ML/MIN/1.73 M^2
GLUCOSE SERPL-MCNC: 114 MG/DL (ref 70–110)
GLUCOSE SERPL-MCNC: 230 MG/DL (ref 70–110)
GLUCOSE SERPL-MCNC: 259 MG/DL (ref 70–110)
GLUCOSE SERPL-MCNC: 97 MG/DL (ref 70–110)
HCT VFR BLD AUTO: 23.9 % (ref 37–48.5)
HGB BLD-MCNC: 8.2 G/DL (ref 12–16)
IMM GRANULOCYTES # BLD AUTO: 0.01 K/UL (ref 0–0.04)
IMM GRANULOCYTES NFR BLD AUTO: 0.2 % (ref 0–0.5)
LACTATE SERPL-SCNC: 1.3 MMOL/L (ref 0.5–1.9)
LYMPHOCYTES # BLD AUTO: 0.9 K/UL (ref 1–4.8)
LYMPHOCYTES NFR BLD: 22.9 % (ref 18–48)
MAGNESIUM SERPL-MCNC: 2 MG/DL (ref 1.6–2.6)
MCH RBC QN AUTO: 29.3 PG (ref 27–31)
MCHC RBC AUTO-ENTMCNC: 34.3 G/DL (ref 32–36)
MCV RBC AUTO: 85 FL (ref 82–98)
MONOCYTES # BLD AUTO: 0.3 K/UL (ref 0.3–1)
MONOCYTES NFR BLD: 8.3 % (ref 4–15)
NEUTROPHILS # BLD AUTO: 2.8 K/UL (ref 1.8–7.7)
NEUTROPHILS NFR BLD: 67.1 % (ref 38–73)
NRBC BLD-RTO: 1 /100 WBC
PHOSPHATE SERPL-MCNC: 5.9 MG/DL (ref 2.7–4.5)
PLATELET # BLD AUTO: 58 K/UL (ref 150–450)
PMV BLD AUTO: 8.9 FL (ref 9.2–12.9)
POCT GLUCOSE: 226 MG/DL (ref 70–110)
POTASSIUM SERPL-SCNC: 3.5 MMOL/L (ref 3.5–5.1)
PROCALCITONIN SERPL IA-MCNC: 1.03 NG/ML (ref 0–0.5)
RBC # BLD AUTO: 2.8 M/UL (ref 4–5.4)
SODIUM SERPL-SCNC: 142 MMOL/L (ref 136–145)
WBC # BLD AUTO: 4.11 K/UL (ref 3.9–12.7)

## 2023-08-01 PROCEDURE — 83605 ASSAY OF LACTIC ACID: CPT | Performed by: INTERNAL MEDICINE

## 2023-08-01 PROCEDURE — 12000002 HC ACUTE/MED SURGE SEMI-PRIVATE ROOM

## 2023-08-01 PROCEDURE — 63600175 PHARM REV CODE 636 W HCPCS: Performed by: FAMILY MEDICINE

## 2023-08-01 PROCEDURE — 96372 THER/PROPH/DIAG INJ SC/IM: CPT | Performed by: INTERNAL MEDICINE

## 2023-08-01 PROCEDURE — 84100 ASSAY OF PHOSPHORUS: CPT | Performed by: FAMILY MEDICINE

## 2023-08-01 PROCEDURE — 80048 BASIC METABOLIC PNL TOTAL CA: CPT | Performed by: FAMILY MEDICINE

## 2023-08-01 PROCEDURE — 84145 PROCALCITONIN (PCT): CPT | Performed by: INTERNAL MEDICINE

## 2023-08-01 PROCEDURE — 36415 COLL VENOUS BLD VENIPUNCTURE: CPT | Performed by: INTERNAL MEDICINE

## 2023-08-01 PROCEDURE — 90935 HEMODIALYSIS ONE EVALUATION: CPT

## 2023-08-01 PROCEDURE — G0378 HOSPITAL OBSERVATION PER HR: HCPCS

## 2023-08-01 PROCEDURE — 63600175 PHARM REV CODE 636 W HCPCS: Mod: JZ | Performed by: INTERNAL MEDICINE

## 2023-08-01 PROCEDURE — 85025 COMPLETE CBC W/AUTO DIFF WBC: CPT | Performed by: FAMILY MEDICINE

## 2023-08-01 PROCEDURE — 99285 EMERGENCY DEPT VISIT HI MDM: CPT | Mod: 25

## 2023-08-01 PROCEDURE — 25000003 PHARM REV CODE 250: Performed by: INTERNAL MEDICINE

## 2023-08-01 PROCEDURE — 83036 HEMOGLOBIN GLYCOSYLATED A1C: CPT | Performed by: INTERNAL MEDICINE

## 2023-08-01 PROCEDURE — 83735 ASSAY OF MAGNESIUM: CPT | Performed by: FAMILY MEDICINE

## 2023-08-01 PROCEDURE — 96374 THER/PROPH/DIAG INJ IV PUSH: CPT

## 2023-08-01 PROCEDURE — 96361 HYDRATE IV INFUSION ADD-ON: CPT

## 2023-08-01 PROCEDURE — 63600175 PHARM REV CODE 636 W HCPCS: Performed by: INTERNAL MEDICINE

## 2023-08-01 PROCEDURE — 82962 GLUCOSE BLOOD TEST: CPT

## 2023-08-01 RX ORDER — DIPHENHYDRAMINE HCL 25 MG
25 CAPSULE ORAL EVERY 6 HOURS PRN
Status: DISCONTINUED | OUTPATIENT
Start: 2023-08-01 | End: 2023-08-03 | Stop reason: HOSPADM

## 2023-08-01 RX ORDER — LEVETIRACETAM 500 MG/1
500 TABLET ORAL 2 TIMES DAILY
Status: DISCONTINUED | OUTPATIENT
Start: 2023-08-01 | End: 2023-08-03 | Stop reason: HOSPADM

## 2023-08-01 RX ORDER — GLUCAGON 1 MG
1 KIT INJECTION
Status: DISCONTINUED | OUTPATIENT
Start: 2023-08-01 | End: 2023-08-03 | Stop reason: HOSPADM

## 2023-08-01 RX ORDER — IBUPROFEN 200 MG
16 TABLET ORAL
Status: DISCONTINUED | OUTPATIENT
Start: 2023-08-01 | End: 2023-08-01

## 2023-08-01 RX ORDER — GABAPENTIN 300 MG/1
600 CAPSULE ORAL 2 TIMES DAILY
Status: DISCONTINUED | OUTPATIENT
Start: 2023-08-01 | End: 2023-08-03 | Stop reason: HOSPADM

## 2023-08-01 RX ORDER — IBUPROFEN 200 MG
16 TABLET ORAL
Status: DISCONTINUED | OUTPATIENT
Start: 2023-08-01 | End: 2023-08-03 | Stop reason: HOSPADM

## 2023-08-01 RX ORDER — NALOXONE HCL 0.4 MG/ML
0.02 VIAL (ML) INJECTION
Status: DISCONTINUED | OUTPATIENT
Start: 2023-08-01 | End: 2023-08-03 | Stop reason: HOSPADM

## 2023-08-01 RX ORDER — ISOSORBIDE MONONITRATE 30 MG/1
30 TABLET, EXTENDED RELEASE ORAL DAILY
Status: DISCONTINUED | OUTPATIENT
Start: 2023-08-01 | End: 2023-08-03 | Stop reason: HOSPADM

## 2023-08-01 RX ORDER — POLYETHYLENE GLYCOL 3350 17 G/17G
17 POWDER, FOR SOLUTION ORAL 2 TIMES DAILY PRN
Status: DISCONTINUED | OUTPATIENT
Start: 2023-08-01 | End: 2023-08-03 | Stop reason: HOSPADM

## 2023-08-01 RX ORDER — CARVEDILOL 25 MG/1
25 TABLET ORAL 2 TIMES DAILY
Status: DISCONTINUED | OUTPATIENT
Start: 2023-08-01 | End: 2023-08-03 | Stop reason: HOSPADM

## 2023-08-01 RX ORDER — HYDRALAZINE HYDROCHLORIDE 25 MG/1
100 TABLET, FILM COATED ORAL 2 TIMES DAILY
Status: DISCONTINUED | OUTPATIENT
Start: 2023-08-01 | End: 2023-08-03 | Stop reason: HOSPADM

## 2023-08-01 RX ORDER — FLUOXETINE HYDROCHLORIDE 20 MG/1
20 CAPSULE ORAL DAILY
Status: DISCONTINUED | OUTPATIENT
Start: 2023-08-01 | End: 2023-08-03 | Stop reason: HOSPADM

## 2023-08-01 RX ORDER — ACETAMINOPHEN 325 MG/1
650 TABLET ORAL EVERY 4 HOURS PRN
Status: DISCONTINUED | OUTPATIENT
Start: 2023-08-01 | End: 2023-08-03 | Stop reason: HOSPADM

## 2023-08-01 RX ORDER — SODIUM CHLORIDE 9 MG/ML
INJECTION, SOLUTION INTRAVENOUS
Status: DISCONTINUED | OUTPATIENT
Start: 2023-08-01 | End: 2023-08-03 | Stop reason: HOSPADM

## 2023-08-01 RX ORDER — TALC
6 POWDER (GRAM) TOPICAL NIGHTLY PRN
Status: DISCONTINUED | OUTPATIENT
Start: 2023-08-01 | End: 2023-08-03 | Stop reason: HOSPADM

## 2023-08-01 RX ORDER — INSULIN ASPART 100 [IU]/ML
1-10 INJECTION, SOLUTION INTRAVENOUS; SUBCUTANEOUS
Status: DISCONTINUED | OUTPATIENT
Start: 2023-08-01 | End: 2023-08-03 | Stop reason: HOSPADM

## 2023-08-01 RX ORDER — AMLODIPINE BESYLATE 5 MG/1
5 TABLET ORAL DAILY
Status: DISCONTINUED | OUTPATIENT
Start: 2023-08-01 | End: 2023-08-03 | Stop reason: HOSPADM

## 2023-08-01 RX ORDER — PANTOPRAZOLE SODIUM 40 MG/1
40 TABLET, DELAYED RELEASE ORAL
Status: DISCONTINUED | OUTPATIENT
Start: 2023-08-01 | End: 2023-08-03 | Stop reason: HOSPADM

## 2023-08-01 RX ORDER — MUPIROCIN 20 MG/G
OINTMENT TOPICAL 2 TIMES DAILY
Status: DISCONTINUED | OUTPATIENT
Start: 2023-08-01 | End: 2023-08-03 | Stop reason: HOSPADM

## 2023-08-01 RX ORDER — ONDANSETRON 2 MG/ML
4 INJECTION INTRAMUSCULAR; INTRAVENOUS EVERY 6 HOURS PRN
Status: DISCONTINUED | OUTPATIENT
Start: 2023-08-01 | End: 2023-08-03 | Stop reason: HOSPADM

## 2023-08-01 RX ORDER — DICYCLOMINE HYDROCHLORIDE 10 MG/1
10 CAPSULE ORAL EVERY 6 HOURS PRN
Status: DISCONTINUED | OUTPATIENT
Start: 2023-08-01 | End: 2023-08-03 | Stop reason: HOSPADM

## 2023-08-01 RX ORDER — SODIUM CHLORIDE 9 MG/ML
INJECTION, SOLUTION INTRAVENOUS ONCE
Status: DISCONTINUED | OUTPATIENT
Start: 2023-08-01 | End: 2023-08-03 | Stop reason: HOSPADM

## 2023-08-01 RX ORDER — HYDROCODONE BITARTRATE AND ACETAMINOPHEN 7.5; 325 MG/1; MG/1
1 TABLET ORAL EVERY 6 HOURS PRN
Status: DISCONTINUED | OUTPATIENT
Start: 2023-08-01 | End: 2023-08-03 | Stop reason: HOSPADM

## 2023-08-01 RX ORDER — IBUPROFEN 200 MG
24 TABLET ORAL
Status: DISCONTINUED | OUTPATIENT
Start: 2023-08-01 | End: 2023-08-01

## 2023-08-01 RX ORDER — IBUPROFEN 200 MG
24 TABLET ORAL
Status: DISCONTINUED | OUTPATIENT
Start: 2023-08-01 | End: 2023-08-03 | Stop reason: HOSPADM

## 2023-08-01 RX ORDER — SODIUM CHLORIDE 0.9 % (FLUSH) 0.9 %
10 SYRINGE (ML) INJECTION EVERY 12 HOURS PRN
Status: DISCONTINUED | OUTPATIENT
Start: 2023-08-01 | End: 2023-08-03 | Stop reason: HOSPADM

## 2023-08-01 RX ORDER — GLUCAGON 1 MG
1 KIT INJECTION
Status: DISCONTINUED | OUTPATIENT
Start: 2023-08-01 | End: 2023-08-01

## 2023-08-01 RX ADMIN — LEVETIRACETAM 500 MG: 500 TABLET, FILM COATED ORAL at 09:08

## 2023-08-01 RX ADMIN — APIXABAN 5 MG: 5 TABLET, FILM COATED ORAL at 08:08

## 2023-08-01 RX ADMIN — GABAPENTIN 600 MG: 300 CAPSULE ORAL at 08:08

## 2023-08-01 RX ADMIN — GABAPENTIN 600 MG: 300 CAPSULE ORAL at 09:08

## 2023-08-01 RX ADMIN — CARVEDILOL 25 MG: 25 TABLET, FILM COATED ORAL at 09:08

## 2023-08-01 RX ADMIN — APIXABAN 5 MG: 5 TABLET, FILM COATED ORAL at 09:08

## 2023-08-01 RX ADMIN — EPOETIN ALFA-EPBX 2700 UNITS: 10000 INJECTION, SOLUTION INTRAVENOUS; SUBCUTANEOUS at 03:08

## 2023-08-01 RX ADMIN — HYDROCODONE BITARTRATE AND ACETAMINOPHEN 1 TABLET: 7.5; 325 TABLET ORAL at 09:08

## 2023-08-01 RX ADMIN — HYDRALAZINE HYDROCHLORIDE 100 MG: 25 TABLET ORAL at 08:08

## 2023-08-01 RX ADMIN — HYDRALAZINE HYDROCHLORIDE 100 MG: 25 TABLET ORAL at 09:08

## 2023-08-01 RX ADMIN — HYDROMORPHONE HYDROCHLORIDE 0.5 MG: 1 INJECTION, SOLUTION INTRAMUSCULAR; INTRAVENOUS; SUBCUTANEOUS at 12:08

## 2023-08-01 RX ADMIN — INSULIN DETEMIR 18 UNITS: 100 INJECTION, SOLUTION SUBCUTANEOUS at 09:08

## 2023-08-01 RX ADMIN — MUPIROCIN 1 G: 20 OINTMENT TOPICAL at 09:08

## 2023-08-01 RX ADMIN — FLUOXETINE 20 MG: 20 CAPSULE ORAL at 08:08

## 2023-08-01 RX ADMIN — CARVEDILOL 25 MG: 25 TABLET, FILM COATED ORAL at 08:08

## 2023-08-01 RX ADMIN — HYDROCODONE BITARTRATE AND ACETAMINOPHEN 1 TABLET: 7.5; 325 TABLET ORAL at 02:08

## 2023-08-01 RX ADMIN — INSULIN ASPART 6 UNITS: 100 INJECTION, SOLUTION INTRAVENOUS; SUBCUTANEOUS at 08:08

## 2023-08-01 RX ADMIN — PANTOPRAZOLE SODIUM 40 MG: 40 TABLET, DELAYED RELEASE ORAL at 08:08

## 2023-08-01 RX ADMIN — AMLODIPINE BESYLATE 5 MG: 5 TABLET ORAL at 08:08

## 2023-08-01 RX ADMIN — LEVETIRACETAM 500 MG: 500 TABLET, FILM COATED ORAL at 08:08

## 2023-08-01 RX ADMIN — ONDANSETRON 4 MG: 2 INJECTION INTRAMUSCULAR; INTRAVENOUS at 09:08

## 2023-08-01 RX ADMIN — ISOSORBIDE MONONITRATE 30 MG: 30 TABLET, EXTENDED RELEASE ORAL at 08:08

## 2023-08-01 RX ADMIN — HYDROCODONE BITARTRATE AND ACETAMINOPHEN 1 TABLET: 7.5; 325 TABLET ORAL at 08:08

## 2023-08-01 RX ADMIN — DIPHENHYDRAMINE HYDROCHLORIDE 25 MG: 25 CAPSULE ORAL at 02:08

## 2023-08-01 NOTE — H&P
[facility]  History & Physical    Patient Name: Tabby Howard  MRN: [id number]  Patient Class: OP- Observation  Admission Date: 8/1/2023  Attending Physician: Bryant Jeff MD  Primary Care Provider: AIEDN CONNER NP         Patient information was obtained from patient and ER records.     Subjective:     Principal Problem:Abdominal pain    Chief Complaint: No chief complaint on file.       HPI: 33 year old with prior history of ESRD on hemodialysis(T-Th-S),  gastroparesis, seizure, prior C diff infection, DMII, poor vision, sickle cell trait presented to an outside ER with abdominal pain, N/V.  She was found to be anemic with Hg 6.7.  She was transfused blood. CT abd and pelvis with IV contrast obtained and no acute process. She was transferred to [facility] for Nephrology and HD needs as she was due HD today.  Hg has improved to 8.2  after tranfusion.  Initial labs in the ED also revealing of AG 24, Glucose 210, CO2 18.  This has improved to CO2 23 and AG is improving. She may have been in early DKA and this improved with blood transfusion.  Initial LA is 2.7.  Repeat is 1.3.  Seen by Nephrology and HD will be performed today. Still having abdominal pain that is diffuse.  Not currently vomiting. Also reports back pain and pruritus to her back.  There is an area of abrasion where she rubbed her back against a wall in effort to make the itching stop.  She's had issues with anemia requiring periodic transfusions.  Per chart review, 4/2023 Fe 51, Ferritin 5959.  3/2023 Retic count was appropriately elevated. EGD last done 12/2022 and revealed gastritis with no active bleeding.  Colonoscopy last 8/2022 and was poor prep but no acute process seen and no bleeding.       Past Medical History:   Diagnosis Date    ESRD on hemodialysis 04/12/2022    Gastritis 12/2022    EGD was 12/5/22    Gastroparesis 04/12/2022    has not had Emptying study    Heart failure with preserved ejection  fraction 2022    EF 55% on 3/22    History of supraventricular tachycardia     Hyperkalemia 2022    Hypertensive emergency 2022    Sickle cell trait 2022    Type 2 diabetes mellitus        Past Surgical History:   Procedure Laterality Date     SECTION      x 3    COLONOSCOPY      COLONOSCOPY N/A 2022    Procedure: COLONOSCOPY;  Surgeon: Jagdeep Cedeno MD;  Location: [facility];  Service: Endoscopy;  Laterality: N/A;    ESOPHAGOGASTRODUODENOSCOPY N/A 10/18/2019    Procedure: ESOPHAGOGASTRODUODENOSCOPY (EGD);  Surgeon: Gianluca Mendez MD;  Location: [facility];  Service: Endoscopy;  Laterality: N/A;    ESOPHAGOGASTRODUODENOSCOPY N/A 2022    Procedure: EGD (ESOPHAGOGASTRODUODENOSCOPY);  Surgeon: Micky Paredes III, MD;  Location: [facility];  Service: Endoscopy;  Laterality: N/A;    ESOPHAGOGASTRODUODENOSCOPY N/A 2022    Procedure: EGD (ESOPHAGOGASTRODUODENOSCOPY);  Surgeon: Marcelo Zhong MD;  Location: [facility] END[facility];  Service: Endoscopy;  Laterality: N/A;    INSERTION, CATHETER, TUNNELED N/A 2023    Procedure: Insertion,catheter,tunneled;  Surgeon: Carlos Thurman Jr., MD;  Location: [facility] OR;  Service: General;  Laterality: N/A;    LAPAROSCOPIC CHOLECYSTECTOMY N/A 2022    Procedure: CHOLECYSTECTOMY, LAPAROSCOPIC;  Surgeon: Grey Perez MD;  Location: [facility] OR;  Service: General;  Laterality: N/A;    PLACEMENT OF DUAL-LUMEN VASCULAR CATHETER Left 2022    Procedure: INSERTION-CATHETER-JOSEPH;  Surgeon: Dionte Gan MD;  Location: [facility] OR;  Service: General;  Laterality: Left;    PLACEMENT OF DUAL-LUMEN VASCULAR CATHETER Right 2022    Procedure: INSERTION-CATHETER-Hemosplit;  Surgeon: Dionte Gan MD;  Location: [facility] OR;  Service: General;  Laterality: Right;       Review of patient's allergies indicates:   Allergen Reactions    Penicillins Hives       Current Facility-Administered Medications on File Prior to  Encounter   Medication    [COMPLETED] HYDROmorphone injection 0.5 mg    [COMPLETED] HYDROmorphone injection 0.5 mg    [COMPLETED] hyoscyamine ODT 0.125 mg    [COMPLETED] iohexoL (OMNIPAQUE 350) injection 75 mL    [COMPLETED] ondansetron injection 4 mg    [DISCONTINUED] 0.9%  NaCl infusion (for blood administration)     Current Outpatient Medications on File Prior to Encounter   Medication Sig    amLODIPine (NORVASC) 5 MG tablet Take 1 tablet every day by oral route for 90 days.    apixaban (ELIQUIS) 5 mg Tab Take 1 tablet (5 mg total) by mouth 2 (two) times daily.    carvediloL (COREG) 25 MG tablet Take 1 tablet (25 mg total) by mouth 2 (two) times daily.    FLUoxetine 20 MG capsule Take 1 capsule (20 mg total) by mouth once daily.    gabapentin (NEURONTIN) 300 MG capsule Take 2 capsules (600 mg total) by mouth 2 (two) times a day.    hydrALAZINE (APRESOLINE) 100 MG tablet Take 1 tablet (100 mg total) by mouth 2 (two) times a day.    HYDROcodone-acetaminophen (NORCO) 7.5-325 mg per tablet Take 1 tablet by mouth every 6 (six) hours as needed for Pain.    insulin aspart U-100 (NOVOLOG) 100 unit/mL (3 mL) InPn pen Inject 4 Units into the skin 3 (three) times daily with meals.    insulin detemir U-100, Levemir, (LEVEMIR FLEXPEN) 100 unit/mL (3 mL) InPn pen Inject 18 Units into the skin every evening.    isosorbide mononitrate (IMDUR) 30 MG 24 hr tablet Take 1 tablet (30 mg total) by mouth once daily.    levETIRAcetam (KEPPRA) 500 MG Tab Take 1 tablet twice a day by oral route for 30 days.    ondansetron (ZOFRAN) 4 MG tablet Take 1 tablet (4 mg total) by mouth every 6 (six) hours as needed for Nausea.    pantoprazole (PROTONIX) 40 MG tablet Take 1 tablet every day by oral route for 90 days.    promethazine (PHENERGAN) 25 MG suppository Place 1 suppository (25 mg total) rectally every 6 (six) hours as needed for Nausea.    sucralfate (CARAFATE) 100 mg/mL suspension Take 10 mLs (1,000 mg total) by  "mouth 4 (four) times daily.    amLODIPine (NORVASC) 5 MG tablet Take 1 tablet (5 mg total) by mouth once daily.    apixaban (ELIQUIS) 5 mg Tab Take 1 tablet (5 mg total) by mouth 2 (two) times daily.    apixaban (ELIQUIS) 5 mg Tab Take 1 tablet twice a day by oral route for 90 days.    FLUoxetine 20 MG capsule Take 1 capsule every day by oral route for 90 days.    gabapentin (NEURONTIN) 100 MG capsule Take 1 capsule by mouth 3 times daily    gabapentin (NEURONTIN) 300 MG capsule Take 2 capsules twice a day by oral route for 90 days.    hydrALAZINE (APRESOLINE) 100 MG tablet Take 1 tablet (100 mg total) by mouth every 8 (eight) hours.    hydrALAZINE (APRESOLINE) 100 MG tablet Take 1 tablet twice a day by oral route for 90 days.    insulin aspart U-100 (NOVOLOG FLEXPEN U-100 INSULIN) 100 unit/mL (3 mL) InPn pen Inject 5 Units into the skin 3 (three) times daily before meals.    insulin aspart U-100 (NOVOLOG FLEXPEN U-100 INSULIN) 100 unit/mL (3 mL) InPn pen Inject 8 units 3 times a day by subcutaneous route before meals for 90 days.    insulin detemir U-100, Levemir, (LEVEMIR FLEXPEN) 100 unit/mL (3 mL) InPn pen Inject 18 units into the skin every evening    insulin detemir U-100, Levemir, 100 unit/mL (3 mL) SubQ InPn pen Inject 18 Units into the skin once daily.    isosorbide mononitrate (IMDUR) 30 MG 24 hr tablet Take 3 tablets (90 mg total) by mouth once daily.    isosorbide mononitrate (IMDUR) 30 MG 24 hr tablet Take 1 tablet every day by oral route for 90 days.    lancets 33 gauge Misc Use to test twice daily    ondansetron (ZOFRAN-ODT) 4 MG TbDL Place 1 tablet (4 mg) on or under the tongue every 8 hours by translingual route for 10 days.    pantoprazole (PROTONIX) 40 MG tablet Take 1 tablet every day by oral route for 30 days.    pantoprazole (PROTONIX) 40 MG tablet Take 1 tablet every day by oral route for 30 days.    pen needle, diabetic 31 gauge x 3/16" Ndle Use as directed to inject " insulin 5 times daily    promethazine (PHENERGAN) 25 MG suppository Place 1 suppository (25 mg total) rectally every 6 (six) hours as needed for Nausea.    sucralfate (CARAFATE) 100 mg/mL suspension Take 10 mL 4 times a day by oral route before meals for 30 days.    [DISCONTINUED] atenoloL (TENORMIN) 50 MG tablet Take 1 tablet (50 mg total) by mouth every other day.    [DISCONTINUED] omeprazole (PRILOSEC) 20 MG capsule Take 2 capsules (40 mg total) by mouth once daily. for 10 days     Family History       Problem Relation (Age of Onset)    Diabetes Mother, Father          Tobacco Use    Smoking status: Never    Smokeless tobacco: Never   Substance and Sexual Activity    Alcohol use: Not Currently    Drug use: No    Sexual activity: Not Currently     Partners: Male     Birth control/protection: I.U.D.     Review of Systems   Constitutional: Negative.    HENT: Negative.     Eyes: Negative.    Respiratory: Negative.     Cardiovascular: Negative.    Gastrointestinal:  Positive for abdominal pain, nausea and vomiting.   Endocrine: Negative.    Genitourinary: Negative.    Musculoskeletal: Negative.    Skin: Negative.    Allergic/Immunologic: Negative.    Neurological: Negative.    Hematological: Negative.    Psychiatric/Behavioral: Negative.       Objective:     Vital Signs (Most Recent):  Temp: 98.5 °F (36.9 °C) (08/01/23 1500)  Pulse: 80 (08/01/23 1730)  Resp: 18 (08/01/23 1500)  BP: 116/68 (08/01/23 1730)  SpO2: 98 % (08/01/23 1500) Vital Signs (24h Range):  Temp:  [97.6 °F (36.4 °C)-98.8 °F (37.1 °C)] 98.5 °F (36.9 °C)  Pulse:  [78-96] 80  Resp:  [14-24] 18  SpO2:  [92 %-100 %] 98 %  BP: (113-182)/() 116/68     Weight: 54.9 kg (120 lb 15.2 oz)  Body mass index is 22.12 kg/m².     Physical Exam  Vitals and nursing note reviewed.   Constitutional:       General: She is not in acute distress.     Appearance: She is well-developed.      Comments: Uncomfortable secondary to abdominal pain   HENT:      Head:  Normocephalic and atraumatic.   Eyes:      Pupils: Pupils are equal, round, and reactive to light.   Cardiovascular:      Rate and Rhythm: Regular rhythm.      Heart sounds: No murmur heard.  Pulmonary:      Effort: Pulmonary effort is normal.      Breath sounds: Normal breath sounds. No wheezing.   Abdominal:      General: There is no distension.      Palpations: Abdomen is soft.      Tenderness: There is no abdominal tenderness. There is no guarding or rebound.      Comments: Reports tenderness but palpation does not seem to make reported pain worse   Musculoskeletal:         General: Normal range of motion.      Cervical back: Normal range of motion.   Skin:     Findings: No rash.      Comments: Small abrasion between her shoulder blades   Neurological:      Mental Status: She is alert and oriented to person, place, and time.      Cranial Nerves: No cranial nerve deficit.      Sensory: No sensory deficit.              CRANIAL NERVES     CN III, IV, VI   Pupils are equal, round, and reactive to light.       Significant Labs: All pertinent labs within the past 24 hours have been reviewed.  CBC:   Recent Labs   Lab 07/31/23 1802 08/01/23  0452   WBC 4.75 4.11   HGB 6.7* 8.2*   HCT 19.5* 23.9*   PLT 58* 58*     CMP:   Recent Labs   Lab 07/31/23 1802 08/01/23  0452    142   K 3.8 3.5    103   CO2 18* 23   * 230*   BUN 39* 43*   CREATININE 6.8* 7.4*   CALCIUM 8.8 8.1*   PROT 7.2  --    ALBUMIN 3.6  --    BILITOT 3.7*  --    ALKPHOS 197*  --    AST 28  --    ALT 22  --    ANIONGAP 24* 16     Lactic Acid:   Recent Labs   Lab 07/31/23  1802 08/01/23  0813   LACTATE 2.7* 1.3     Lipase:   Recent Labs   Lab 07/31/23  1802   LIPASE 10       Significant Imaging: I have reviewed all pertinent imaging results/findings within the past 24 hours.  CT: I have reviewed all pertinent results/findings within the past 24 hours and my personal findings are:  CT abd and pelvis with no acute  process    Assessment/Plan:     Active Hospital Problems    Diagnosis    *Abdominal pain    Hypertension    Anemia due to chronic kidney disease, on chronic dialysis    Thrombocytopenia    ESRD (end stage renal disease)    Seizure disorder    Diabetes mellitus due to underlying condition with unspecified complications     Plan:    -Etiology of abdominal pain is unclear. This could have been early DKA as demonstrated by labs.  AG and bicarb is improved but she still has pain.  This could gastroparesis.  The etiology of her pain has not been elucidated over multiple hospitalizations. CT abd and pelvis with no obstruction or other acute process.  No colitis, etc.  -prn percocet for pain. Bentyl added for spasms.  On PPI.  -Volume expansion with blood.  LA improved.  -HD today and per routine.  Nephrology consulted and following.  -Chronic anemia.  Workup as per HPI.  Suspected to be multifactorial and due to ESRD state, sickle cell trait, etc. Trending H/H.  -Levemir. ISS. Accuchecks.  -Anticoagulated with Eliquis given DVT.  No active bleeding seen to suggest this is the source of anemia and thus I am electing to continue Eliquis.     VTE Risk Mitigation (From admission, onward)         Ordered     apixaban tablet 5 mg  2 times daily         08/01/23 0732     IP VTE HIGH RISK PATIENT  Once         08/01/23 0732     Place sequential compression device  Until discontinued         08/01/23 0732                   On 08/01/2023, patient should be placed in hospital observation services under my care.        Bryant Jeff MD  Department of Hospital Medicine  [facility]

## 2023-08-01 NOTE — PROVIDER TRANSFER
Outside Transfer Acceptance Note / Regional Referral Center    Referring facility: [facility]   Referring provider: AARON BUNDY, PROVIDER  MONI HOFFMAN, SHIRLEY GODFREY, HÉCTOR HEADLEY  Accepting facility: [facility]  Accepting provider: STACEY NAQVI LLOYD J. JR NAPIER, MALCOLM G. BELL, TAMI GARCIA, GALEN PANIAGUA, JUAN MANUEL HANDLEY  Admitting provider: Dr. Gianluca Lopez  Reason for transfer:  higher level of care  Transfer diagnosis: Abdominal Pain  Transfer specialty requested: Hospital Medicine  Emergency Medicine  Ophthalmology  Nephrology  General Surgery  Nephrology  Transfer specialty notified: No  Transfer level: NUMBER 1-5: 2  Bed type requested: med-surg  Isolation status: No active isolations   Admission class or status: IP- Inpatient  Emergency  IP- Inpatient  IP- Inpatient  IP- Inpatient  OP- Observation      Narrative     33 year old female with PMHx of ESRD on T/Th/S, Sickle cell disease, HTN, DMII c/b gastroparesis who presented to the ED with abdominal pain that she described as sharp in bilateral lower quadrants and suprapubic associated with nausea and vomiting.     Initial vitals: /89, HR 90, RR 22, afebrile and 100% on room air. Labs notable for a drop in Hb from 11.8 to 6.7 - denies brbpr, guaiac negative. Also elevated tbili of 3.7, LA of 2.7, slight metabolic acidosis. CT abdomen largely unremarkable per referring physician.    Patient receiving 1U PRBC in the ED and got .5mg of dilaudid for her abdominal pain. Transfer request for nephrology as patient will need HD tomorrow as well as anemia workup.       Objective  "    Vitals: Temp: 97.6 °F (36.4 °C) (07/31/23 2255)  Pulse: 86 (07/31/23 2250)  Resp: 14 (07/31/23 2132)  BP: (!) 165/103 (07/31/23 2250)  SpO2: 100 % (07/31/23 2250)  Recent Labs: All pertinent labs within the past 24 hours have been reviewed.  Bilirubin:   Recent Labs   Lab 07/10/23  2341 07/31/23  1802   BILITOT 1.4* 3.7*     BMP:   Recent Labs   Lab 07/31/23 1802 07/31/23 1951   *  --      --    K 3.8  --      --    CO2 18*  --    BUN 39*  --    CREATININE 6.8*  --    CALCIUM 8.8  --    MG  --  1.9     CBC:   Recent Labs   Lab 07/31/23 1802   WBC 4.75   HGB 6.7*   HCT 19.5*   PLT 58*     CMP:   Recent Labs   Lab 07/31/23 1802      K 3.8      CO2 18*   *   BUN 39*   CREATININE 6.8*   CALCIUM 8.8   PROT 7.2   ALBUMIN 3.6   BILITOT 3.7*   ALKPHOS 197*   AST 28   ALT 22   ANIONGAP 24*     Cardiac Markers: No results for input(s): "CKMB", "MYOGLOBIN", "BNP", "TROPISTAT" in the last 48 hours.  Coagulation: No results for input(s): "PT", "INR", "APTT" in the last 48 hours.  Lactic Acid:   Recent Labs   Lab 07/31/23 1802   LACTATE 2.7*     Lipase:   Recent Labs   Lab 07/31/23 1802   LIPASE 10     Magnesium:   Recent Labs   Lab 07/31/23 1951   MG 1.9     Urine Studies: No results for input(s): "COLORU", "APPEARANCEUA", "PHUR", "SPECGRAV", "PROTEINUA", "GLUCUA", "KETONESU", "BILIRUBINUA", "OCCULTUA", "NITRITE", "UROBILINOGEN", "LEUKOCYTESUR", "RBCUA", "WBCUA", "BACTERIA", "SQUAMEPITHEL", "HYALINECASTS" in the last 48 hours.    Invalid input(s): "CECILIASUR"  Recent imaging:   CT Abdomen Pelvis:pending final read however per ED physician, no acute abnormalities  CXR: stable cardiomegaly     Airway:     Vent settings:         IV access:        Peripheral IV - Single Lumen 07/31/23 1830 22 G Anterior;Distal;Left Forearm (Active)            Peripheral IV - Single Lumen 07/31/23 1923 20 G Anterior;Left Forearm (Active)     Infusions: 1U PRBC  Allergies:   Review of patient's " allergies indicates:   Allergen Reactions    Penicillins Hives      NPO: No    Anticoagulation:   Anticoagulants       None             Instructions      [facility]  Admit to Hospital Medicine  Upon patient arrival to floor, please contact Hospital Medicine on call.     Consult nephrology  Anemia workup

## 2023-08-01 NOTE — CONSULTS
INPATIENT NEPHROLOGY CONSULT   [facility]    Tabby Howard  08/01/2023    Reason for consultation:  esrd    Chief Complaint: No chief complaint on file.         History of Present Illness:     Patient comes our facility with complaints of lower abdominal pain affecting her suprapubic area as well as both lower quadrants.  Patient claims that the pain is sharp in nature.  It started yesterday and has been continuous.  Patient has had a history of chronic abdominal pain with known history of gastroparesis.  Patient states this pain is different as typically her pain is crampy in nature and this is sharp.  Additionally, her usual abdominal pain is epigastric and this was lower abdominal.  Triage listed flank pain as a chief complaint but patient describes bilateral paraspinal lumbar pain rather than flank pain.  Patient is a dialysis patient and received this on Tuesday, Thursday, Saturday.  Last dialysis 2 days prior.  Patient's nephrologist is Mohsen Sommer.  PCP is out of [place].  Patient denies any associated fevers or chills.  She has had some nausea with emesis mildly improved Zofran.  Patient denies any diarrhea.  She had a normal bowel today.  Patient denies any rectal pain.  Patient denies any gross blood per rectum.  Patient does not make any urine due to her end-stage renal disease.  Patient denies any chest pains, dyspnea, consumption of unusual or suspect foods.    8/1  consulted for dialysis management.  Bp better this am. C/o abd pain.  Always c/o abd pain for last two years.       Plan of Care:       Assessment:    esrd  --continue dialysis per routine  --fluid restrict  --renal dose medication per routine  --continue outpt medication  --continue binders with meals    Anemia  --erythropoiesis stimulating agent with renal replacement therapy    Hyperphosphatemia  --renal diet  --continue binders    Hypertension  --uf with hd  --fluid restrict  --low salt diet  --continue home  medication         Thank you for allowing us to participate in this patient's care. We will continue to follow.    Vital Signs:  Temp Readings from Last 3 Encounters:   23 98.2 °F (36.8 °C) (Oral)   23 97.7 °F (36.5 °C)   07/10/23 97.9 °F (36.6 °C) (Temporal)       Pulse Readings from Last 3 Encounters:   23 79   23 80   23 81       BP Readings from Last 3 Encounters:   23 117/73   23 (!) 152/97   23 (!) 198/120       Weight:  Wt Readings from Last 3 Encounters:   23 54.9 kg (120 lb 15.2 oz)   23 50.8 kg (112 lb)   07/10/23 57.2 kg (126 lb)       Past Medical & Surgical History:  Past Medical History:   Diagnosis Date    ESRD on hemodialysis 2022    Gastritis 2022    EGD was 21    Gastroparesis 2022    has not had Emptying study    Heart failure with preserved ejection fraction 2022    EF 55% on 3/22    History of supraventricular tachycardia     Hyperkalemia 2022    Hypertensive emergency 2022    Sickle cell trait 2022    Type 2 diabetes mellitus        Past Surgical History:   Procedure Laterality Date     SECTION      x 3    COLONOSCOPY      COLONOSCOPY N/A 2022    Procedure: COLONOSCOPY;  Surgeon: Jagdeep Cedeno MD;  Location: [facility];  Service: Endoscopy;  Laterality: N/A;    ESOPHAGOGASTRODUODENOSCOPY N/A 10/18/2019    Procedure: ESOPHAGOGASTRODUODENOSCOPY (EGD);  Surgeon: Gianluca Mendez MD;  Location: [facility];  Service: Endoscopy;  Laterality: N/A;    ESOPHAGOGASTRODUODENOSCOPY N/A 2022    Procedure: EGD (ESOPHAGOGASTRODUODENOSCOPY);  Surgeon: Micky Paredes III, MD;  Location: [facility];  Service: Endoscopy;  Laterality: N/A;    ESOPHAGOGASTRODUODENOSCOPY N/A 2022    Procedure: EGD (ESOPHAGOGASTRODUODENOSCOPY);  Surgeon: Marcelo Zhong MD;  Location: [facility];  Service: Endoscopy;  Laterality: N/A;    INSERTION, CATHETER, TUNNELED N/A 2023    Procedure:  Insertion,catheter,tunneled;  Surgeon: Carlos Thurman Jr., MD;  Location: [facility] OR;  Service: General;  Laterality: N/A;    LAPAROSCOPIC CHOLECYSTECTOMY N/A 07/30/2022    Procedure: CHOLECYSTECTOMY, LAPAROSCOPIC;  Surgeon: Grey Perez MD;  Location: [facility] OR;  Service: General;  Laterality: N/A;    PLACEMENT OF DUAL-LUMEN VASCULAR CATHETER Left 07/12/2022    Procedure: INSERTION-CATHETER-JOSEPH;  Surgeon: Dionte Gan MD;  Location: [facility] OR;  Service: General;  Laterality: Left;    PLACEMENT OF DUAL-LUMEN VASCULAR CATHETER Right 07/26/2022    Procedure: INSERTION-CATHETER-Hemosplit;  Surgeon: Dionte Gan MD;  Location: [facility] OR;  Service: General;  Laterality: Right;       Past Social History:  Social History     Socioeconomic History    Marital status:    Tobacco Use    Smoking status: Never    Smokeless tobacco: Never   Substance and Sexual Activity    Alcohol use: Not Currently    Drug use: No    Sexual activity: Not Currently     Partners: Male     Birth control/protection: I.U.D.     Social Determinants of Health     Financial Resource Strain: Low Risk  (5/16/2023)    Overall Financial Resource Strain (CARDIA)     Difficulty of Paying Living Expenses: Not very hard   Food Insecurity: No Food Insecurity (5/16/2023)    Hunger Vital Sign     Worried About Running Out of Food in the Last Year: Never true     Ran Out of Food in the Last Year: Never true   Transportation Needs: No Transportation Needs (5/16/2023)    PRAPARE - Transportation     Lack of Transportation (Medical): No     Lack of Transportation (Non-Medical): No   Physical Activity: Inactive (5/16/2023)    Exercise Vital Sign     Days of Exercise per Week: 0 days     Minutes of Exercise per Session: 0 min   Stress: No Stress Concern Present (5/16/2023)    [employer] [place] [employer] - Occupational Stress Questionnaire     Feeling of Stress : Not at all   Social Connections: Unknown (5/16/2023)    Social Connection and  Isolation Panel [NHANES]     Frequency of Communication with Friends and Family: More than three times a week     Frequency of Social Gatherings with Friends and Family: More than three times a week     Attends [employer] Services: Never     Active Member of Clubs or Organizations: No     Attends Club or Organization Meetings: Never     Marital Status: Patient refused   Housing Stability: Low Risk  (5/16/2023)    Housing Stability Vital Sign     Unable to Pay for Housing in the Last Year: No     Number of Places Lived in the Last Year: 1     Unstable Housing in the Last Year: No       Medications:  Current Facility-Administered Medications on File Prior to Encounter   Medication Dose Route Frequency Provider Last Rate Last Admin    [COMPLETED] HYDROmorphone injection 0.5 mg  0.5 mg Intravenous ED 1 Time Ben Wright MD   0.5 mg at 07/31/23 1914    [COMPLETED] HYDROmorphone injection 0.5 mg  0.5 mg Intravenous ED 1 Time Ben Wright MD   0.5 mg at 08/01/23 0013    [COMPLETED] hyoscyamine ODT 0.125 mg  0.125 mg Oral ED 1 Time Ben Wright MD   0.125 mg at 07/31/23 1915    [COMPLETED] iohexoL (OMNIPAQUE 350) injection 75 mL  75 mL Intravenous ONCE PRN Ben Wright MD   75 mL at 07/31/23 2116    [COMPLETED] ondansetron injection 4 mg  4 mg Intravenous ED 1 Time Ben Wright MD   4 mg at 07/31/23 1914    [DISCONTINUED] 0.9%  NaCl infusion (for blood administration)   Intravenous Q24H PRN Ben Wright MD         Current Outpatient Medications on File Prior to Encounter   Medication Sig Dispense Refill    amLODIPine (NORVASC) 5 MG tablet Take 1 tablet every day by oral route for 90 days. 90 tablet 1    apixaban (ELIQUIS) 5 mg Tab Take 1 tablet (5 mg total) by mouth 2 (two) times daily. 60 tablet 2    carvediloL (COREG) 25 MG tablet Take 1 tablet (25 mg total) by mouth 2 (two) times daily. 60 tablet 5    FLUoxetine 20 MG capsule Take 1 capsule (20 mg total) by mouth once daily. 30 capsule  5    gabapentin (NEURONTIN) 300 MG capsule Take 2 capsules (600 mg total) by mouth 2 (two) times a day. 120 capsule 1    hydrALAZINE (APRESOLINE) 100 MG tablet Take 1 tablet (100 mg total) by mouth 2 (two) times a day. 60 tablet 2    HYDROcodone-acetaminophen (NORCO) 7.5-325 mg per tablet Take 1 tablet by mouth every 6 (six) hours as needed for Pain. 30 tablet 0    insulin aspart U-100 (NOVOLOG) 100 unit/mL (3 mL) InPn pen Inject 4 Units into the skin 3 (three) times daily with meals. 15 mL 3    insulin detemir U-100, Levemir, (LEVEMIR FLEXPEN) 100 unit/mL (3 mL) InPn pen Inject 18 Units into the skin every evening. 9 mL 5    isosorbide mononitrate (IMDUR) 30 MG 24 hr tablet Take 1 tablet (30 mg total) by mouth once daily. 30 tablet 1    levETIRAcetam (KEPPRA) 500 MG Tab Take 1 tablet twice a day by oral route for 30 days. 60 tablet 2    ondansetron (ZOFRAN) 4 MG tablet Take 1 tablet (4 mg total) by mouth every 6 (six) hours as needed for Nausea. 12 tablet 0    pantoprazole (PROTONIX) 40 MG tablet Take 1 tablet every day by oral route for 90 days. 90 tablet 1    promethazine (PHENERGAN) 25 MG suppository Place 1 suppository (25 mg total) rectally every 6 (six) hours as needed for Nausea. 10 suppository 0    sucralfate (CARAFATE) 100 mg/mL suspension Take 10 mLs (1,000 mg total) by mouth 4 (four) times daily. 1200 mL 1    amLODIPine (NORVASC) 5 MG tablet Take 1 tablet (5 mg total) by mouth once daily. 30 tablet 1    apixaban (ELIQUIS) 5 mg Tab Take 1 tablet (5 mg total) by mouth 2 (two) times daily. 60 tablet 2    apixaban (ELIQUIS) 5 mg Tab Take 1 tablet twice a day by oral route for 90 days. 180 tablet 1    FLUoxetine 20 MG capsule Take 1 capsule every day by oral route for 90 days. 90 capsule 1    gabapentin (NEURONTIN) 100 MG capsule Take 1 capsule by mouth 3 times daily 90 capsule 2    gabapentin (NEURONTIN) 300 MG capsule Take 2 capsules twice a day by oral route for 90 days. 360 capsule 1    hydrALAZINE  "(APRESOLINE) 100 MG tablet Take 1 tablet (100 mg total) by mouth every 8 (eight) hours. 60 tablet 0    hydrALAZINE (APRESOLINE) 100 MG tablet Take 1 tablet twice a day by oral route for 90 days. 180 tablet 1    insulin aspart U-100 (NOVOLOG FLEXPEN U-100 INSULIN) 100 unit/mL (3 mL) InPn pen Inject 5 Units into the skin 3 (three) times daily before meals. 15 mL 2    insulin aspart U-100 (NOVOLOG FLEXPEN U-100 INSULIN) 100 unit/mL (3 mL) InPn pen Inject 8 units 3 times a day by subcutaneous route before meals for 90 days. 15 mL 1    insulin detemir U-100, Levemir, (LEVEMIR FLEXPEN) 100 unit/mL (3 mL) InPn pen Inject 18 units into the skin every evening 15 mL 1    insulin detemir U-100, Levemir, 100 unit/mL (3 mL) SubQ InPn pen Inject 18 Units into the skin once daily. 15 mL 3    isosorbide mononitrate (IMDUR) 30 MG 24 hr tablet Take 3 tablets (90 mg total) by mouth once daily. 90 tablet 11    isosorbide mononitrate (IMDUR) 30 MG 24 hr tablet Take 1 tablet every day by oral route for 90 days. 90 tablet 1    lancets 33 gauge Misc Use to test twice daily 100 each 5    ondansetron (ZOFRAN-ODT) 4 MG TbDL Place 1 tablet (4 mg) on or under the tongue every 8 hours by translingual route for 10 days. 24 tablet 2    pantoprazole (PROTONIX) 40 MG tablet Take 1 tablet every day by oral route for 30 days. 30 tablet 2    pantoprazole (PROTONIX) 40 MG tablet Take 1 tablet every day by oral route for 30 days. 30 tablet 2    pen needle, diabetic 31 gauge x 3/16" Ndle Use as directed to inject insulin 5 times daily 100 each 11    promethazine (PHENERGAN) 25 MG suppository Place 1 suppository (25 mg total) rectally every 6 (six) hours as needed for Nausea. 10 suppository 0    sucralfate (CARAFATE) 100 mg/mL suspension Take 10 mL 4 times a day by oral route before meals for 30 days. 1260 mL 1    [DISCONTINUED] atenoloL (TENORMIN) 50 MG tablet Take 1 tablet (50 mg total) by mouth every other day. 45 tablet 3    [DISCONTINUED] omeprazole " "(PRILOSEC) 20 MG capsule Take 2 capsules (40 mg total) by mouth once daily. for 10 days 20 capsule 0     Scheduled Meds:   amLODIPine  5 mg Oral Daily    apixaban  5 mg Oral BID    carvediloL  25 mg Oral BID    FLUoxetine  20 mg Oral Daily    gabapentin  600 mg Oral BID    hydrALAZINE  100 mg Oral BID    insulin detemir U-100 (Levemir)  18 Units Subcutaneous QHS    isosorbide mononitrate  30 mg Oral Daily    levETIRAcetam  500 mg Oral BID    mupirocin   Nasal BID    pantoprazole  40 mg Oral Before breakfast     Continuous Infusions:  PRN Meds:.acetaminophen, dextrose 50%, dextrose 50%, glucagon (human recombinant), glucose, glucose, HYDROcodone-acetaminophen, insulin aspart U-100, melatonin, naloxone, ondansetron, polyethylene glycol, sodium chloride 0.9%    Allergies:  Penicillins    Past Family History:  Reviewed; refer to Hospitalist Admission Note    Review of Systems:  Review of Systems - All 14 systems reviewed and negative, except as noted in HPI    Physical Exam:    /73   Pulse 79   Temp 98.2 °F (36.8 °C) (Oral)   Resp 18   Ht 5' 2" (1.575 m)   Wt 54.9 kg (120 lb 15.2 oz)   LMP  (Within Years) Comment: patient states last menstrual period was two years ago  SpO2 98%   Breastfeeding No   BMI 22.12 kg/m²     General Appearance:    Alert, cooperative, no distress, appears stated age   Head:    Normocephalic, without obvious abnormality, atraumatic   Eyes:    PER, conjunctiva/corneas clear, EOM's intact in both eyes        Throat:   Lips, mucosa, and tongue normal; teeth and gums normal   Back:     Symmetric, no curvature, ROM normal, no CVA tenderness   Lungs:     Clear to auscultation bilaterally, respirations unlabored   Chest wall:    No tenderness or deformity   Heart:    Regular rate and rhythm, S1 and S2 normal, no murmur, rub   or gallop   Abdomen:     Soft, non-tender, bowel sounds active all four quadrants,     no masses, no organomegaly   Extremities:   Extremities normal, atraumatic, " no cyanosis or edema   Pulses:   2+ and symmetric all extremities   MSK:   No joint or muscle swelling, tenderness or deformity   Skin:   Skin color, texture, turgor normal, no rashes or lesions   Neurologic:   CNII-XII intact, normal strength and sensation       Throughout.  No flap     Results:  Lab Results   Component Value Date     08/01/2023    K 3.5 08/01/2023     08/01/2023    CO2 23 08/01/2023    BUN 43 (H) 08/01/2023    CREATININE 7.4 (H) 08/01/2023    CALCIUM 8.1 (L) 08/01/2023    ANIONGAP 16 08/01/2023    ESTGFRAFRICA 20 (A) 07/31/2022    EGFRNONAA 18 (A) 07/31/2022       Lab Results   Component Value Date    CALCIUM 8.1 (L) 08/01/2023    PHOS 5.9 (H) 08/01/2023       Recent Labs   Lab 08/01/23  0452   WBC 4.11   RBC 2.80*   HGB 8.2*   HCT 23.9*   PLT 58*   MCV 85   MCH 29.3   MCHC 34.3          I have personally reviewed pertinent radiological imaging and reports.

## 2023-08-01 NOTE — SUBJECTIVE & OBJECTIVE
Past Medical History:   Diagnosis Date    ESRD on hemodialysis 2022    Gastritis 2022    EGD was 22    Gastroparesis 2022    has not had Emptying study    Heart failure with preserved ejection fraction 2022    EF 55% on 3/22    History of supraventricular tachycardia     Hyperkalemia 2022    Hypertensive emergency 2022    Sickle cell trait 2022    Type 2 diabetes mellitus        Past Surgical History:   Procedure Laterality Date     SECTION      x 3    COLONOSCOPY      COLONOSCOPY N/A 2022    Procedure: COLONOSCOPY;  Surgeon: Jagdeep Cedeno MD;  Location: [facility];  Service: Endoscopy;  Laterality: N/A;    ESOPHAGOGASTRODUODENOSCOPY N/A 10/18/2019    Procedure: ESOPHAGOGASTRODUODENOSCOPY (EGD);  Surgeon: Gianluca Mendez MD;  Location: [facility];  Service: Endoscopy;  Laterality: N/A;    ESOPHAGOGASTRODUODENOSCOPY N/A 2022    Procedure: EGD (ESOPHAGOGASTRODUODENOSCOPY);  Surgeon: Micky Paredes III, MD;  Location: [facility];  Service: Endoscopy;  Laterality: N/A;    ESOPHAGOGASTRODUODENOSCOPY N/A 2022    Procedure: EGD (ESOPHAGOGASTRODUODENOSCOPY);  Surgeon: Marcelo Zhong MD;  Location: [facility];  Service: Endoscopy;  Laterality: N/A;    INSERTION, CATHETER, TUNNELED N/A 2023    Procedure: Insertion,catheter,tunneled;  Surgeon: Carlos Thurman Jr., MD;  Location: [facility] OR;  Service: General;  Laterality: N/A;    LAPAROSCOPIC CHOLECYSTECTOMY N/A 2022    Procedure: CHOLECYSTECTOMY, LAPAROSCOPIC;  Surgeon: Grey Perez MD;  Location: [facility] OR;  Service: General;  Laterality: N/A;    PLACEMENT OF DUAL-LUMEN VASCULAR CATHETER Left 2022    Procedure: INSERTION-CATHETER-JOSEPH;  Surgeon: Dionte Gan MD;  Location: [facility] OR;  Service: General;  Laterality: Left;    PLACEMENT OF DUAL-LUMEN VASCULAR CATHETER Right 2022    Procedure: INSERTION-CATHETER-Hemosplit;  Surgeon: Dionte Gan MD;  Location: [facility];   Service: General;  Laterality: Right;       Review of patient's allergies indicates:   Allergen Reactions    Penicillins Hives       Current Facility-Administered Medications on File Prior to Encounter   Medication    [COMPLETED] HYDROmorphone injection 0.5 mg    [COMPLETED] HYDROmorphone injection 0.5 mg    [COMPLETED] hyoscyamine ODT 0.125 mg    [COMPLETED] iohexoL (OMNIPAQUE 350) injection 75 mL    [COMPLETED] ondansetron injection 4 mg    [DISCONTINUED] 0.9%  NaCl infusion (for blood administration)     Current Outpatient Medications on File Prior to Encounter   Medication Sig    amLODIPine (NORVASC) 5 MG tablet Take 1 tablet every day by oral route for 90 days.    apixaban (ELIQUIS) 5 mg Tab Take 1 tablet (5 mg total) by mouth 2 (two) times daily.    carvediloL (COREG) 25 MG tablet Take 1 tablet (25 mg total) by mouth 2 (two) times daily.    FLUoxetine 20 MG capsule Take 1 capsule (20 mg total) by mouth once daily.    gabapentin (NEURONTIN) 300 MG capsule Take 2 capsules (600 mg total) by mouth 2 (two) times a day.    hydrALAZINE (APRESOLINE) 100 MG tablet Take 1 tablet (100 mg total) by mouth 2 (two) times a day.    HYDROcodone-acetaminophen (NORCO) 7.5-325 mg per tablet Take 1 tablet by mouth every 6 (six) hours as needed for Pain.    insulin aspart U-100 (NOVOLOG) 100 unit/mL (3 mL) InPn pen Inject 4 Units into the skin 3 (three) times daily with meals.    insulin detemir U-100, Levemir, (LEVEMIR FLEXPEN) 100 unit/mL (3 mL) InPn pen Inject 18 Units into the skin every evening.    isosorbide mononitrate (IMDUR) 30 MG 24 hr tablet Take 1 tablet (30 mg total) by mouth once daily.    levETIRAcetam (KEPPRA) 500 MG Tab Take 1 tablet twice a day by oral route for 30 days.    ondansetron (ZOFRAN) 4 MG tablet Take 1 tablet (4 mg total) by mouth every 6 (six) hours as needed for Nausea.    pantoprazole (PROTONIX) 40 MG tablet Take 1 tablet every day by oral route for 90 days.    promethazine (PHENERGAN) 25 MG  suppository Place 1 suppository (25 mg total) rectally every 6 (six) hours as needed for Nausea.    sucralfate (CARAFATE) 100 mg/mL suspension Take 10 mLs (1,000 mg total) by mouth 4 (four) times daily.    amLODIPine (NORVASC) 5 MG tablet Take 1 tablet (5 mg total) by mouth once daily.    apixaban (ELIQUIS) 5 mg Tab Take 1 tablet (5 mg total) by mouth 2 (two) times daily.    apixaban (ELIQUIS) 5 mg Tab Take 1 tablet twice a day by oral route for 90 days.    FLUoxetine 20 MG capsule Take 1 capsule every day by oral route for 90 days.    gabapentin (NEURONTIN) 100 MG capsule Take 1 capsule by mouth 3 times daily    gabapentin (NEURONTIN) 300 MG capsule Take 2 capsules twice a day by oral route for 90 days.    hydrALAZINE (APRESOLINE) 100 MG tablet Take 1 tablet (100 mg total) by mouth every 8 (eight) hours.    hydrALAZINE (APRESOLINE) 100 MG tablet Take 1 tablet twice a day by oral route for 90 days.    insulin aspart U-100 (NOVOLOG FLEXPEN U-100 INSULIN) 100 unit/mL (3 mL) InPn pen Inject 5 Units into the skin 3 (three) times daily before meals.    insulin aspart U-100 (NOVOLOG FLEXPEN U-100 INSULIN) 100 unit/mL (3 mL) InPn pen Inject 8 units 3 times a day by subcutaneous route before meals for 90 days.    insulin detemir U-100, Levemir, (LEVEMIR FLEXPEN) 100 unit/mL (3 mL) InPn pen Inject 18 units into the skin every evening    insulin detemir U-100, Levemir, 100 unit/mL (3 mL) SubQ InPn pen Inject 18 Units into the skin once daily.    isosorbide mononitrate (IMDUR) 30 MG 24 hr tablet Take 3 tablets (90 mg total) by mouth once daily.    isosorbide mononitrate (IMDUR) 30 MG 24 hr tablet Take 1 tablet every day by oral route for 90 days.    lancets 33 gauge Misc Use to test twice daily    ondansetron (ZOFRAN-ODT) 4 MG TbDL Place 1 tablet (4 mg) on or under the tongue every 8 hours by translingual route for 10 days.    pantoprazole (PROTONIX) 40 MG tablet Take 1 tablet every day by oral route for 30 days.     "pantoprazole (PROTONIX) 40 MG tablet Take 1 tablet every day by oral route for 30 days.    pen needle, diabetic 31 gauge x 3/16" Ndle Use as directed to inject insulin 5 times daily    promethazine (PHENERGAN) 25 MG suppository Place 1 suppository (25 mg total) rectally every 6 (six) hours as needed for Nausea.    sucralfate (CARAFATE) 100 mg/mL suspension Take 10 mL 4 times a day by oral route before meals for 30 days.    [DISCONTINUED] atenoloL (TENORMIN) 50 MG tablet Take 1 tablet (50 mg total) by mouth every other day.    [DISCONTINUED] omeprazole (PRILOSEC) 20 MG capsule Take 2 capsules (40 mg total) by mouth once daily. for 10 days     Family History       Problem Relation (Age of Onset)    Diabetes Mother, Father          Tobacco Use    Smoking status: Never    Smokeless tobacco: Never   Substance and Sexual Activity    Alcohol use: Not Currently    Drug use: No    Sexual activity: Not Currently     Partners: Male     Birth control/protection: I.U.D.     Review of Systems   Constitutional: Negative.    HENT: Negative.     Eyes: Negative.    Respiratory: Negative.     Cardiovascular: Negative.    Gastrointestinal:  Positive for abdominal pain, nausea and vomiting.   Endocrine: Negative.    Genitourinary: Negative.    Musculoskeletal: Negative.    Skin: Negative.    Allergic/Immunologic: Negative.    Neurological: Negative.    Hematological: Negative.    Psychiatric/Behavioral: Negative.       Objective:     Vital Signs (Most Recent):  Temp: 98.5 °F (36.9 °C) (08/01/23 1500)  Pulse: 80 (08/01/23 1730)  Resp: 18 (08/01/23 1500)  BP: 116/68 (08/01/23 1730)  SpO2: 98 % (08/01/23 1500) Vital Signs (24h Range):  Temp:  [97.6 °F (36.4 °C)-98.8 °F (37.1 °C)] 98.5 °F (36.9 °C)  Pulse:  [78-96] 80  Resp:  [14-24] 18  SpO2:  [92 %-100 %] 98 %  BP: (113-182)/() 116/68     Weight: 54.9 kg (120 lb 15.2 oz)  Body mass index is 22.12 kg/m².     Physical Exam  Vitals and nursing note reviewed.   Constitutional:       " General: She is not in acute distress.     Appearance: She is well-developed.      Comments: Uncomfortable secondary to abdominal pain   HENT:      Head: Normocephalic and atraumatic.   Eyes:      Pupils: Pupils are equal, round, and reactive to light.   Cardiovascular:      Rate and Rhythm: Regular rhythm.      Heart sounds: No murmur heard.  Pulmonary:      Effort: Pulmonary effort is normal.      Breath sounds: Normal breath sounds. No wheezing.   Abdominal:      General: There is no distension.      Palpations: Abdomen is soft.      Tenderness: There is no abdominal tenderness. There is no guarding or rebound.      Comments: Reports tenderness but palpation does not seem to make reported pain worse   Musculoskeletal:         General: Normal range of motion.      Cervical back: Normal range of motion.   Skin:     Findings: No rash.      Comments: Small abrasion between her shoulder blades   Neurological:      Mental Status: She is alert and oriented to person, place, and time.      Cranial Nerves: No cranial nerve deficit.      Sensory: No sensory deficit.              CRANIAL NERVES     CN III, IV, VI   Pupils are equal, round, and reactive to light.       Significant Labs: All pertinent labs within the past 24 hours have been reviewed.  CBC:   Recent Labs   Lab 07/31/23 1802 08/01/23  0452   WBC 4.75 4.11   HGB 6.7* 8.2*   HCT 19.5* 23.9*   PLT 58* 58*     CMP:   Recent Labs   Lab 07/31/23 1802 08/01/23  0452    142   K 3.8 3.5    103   CO2 18* 23   * 230*   BUN 39* 43*   CREATININE 6.8* 7.4*   CALCIUM 8.8 8.1*   PROT 7.2  --    ALBUMIN 3.6  --    BILITOT 3.7*  --    ALKPHOS 197*  --    AST 28  --    ALT 22  --    ANIONGAP 24* 16     Lactic Acid:   Recent Labs   Lab 07/31/23 1802 08/01/23  0813   LACTATE 2.7* 1.3     Lipase:   Recent Labs   Lab 07/31/23  1802   LIPASE 10       Significant Imaging: I have reviewed all pertinent imaging results/findings within the past 24 hours.  CT: I have  reviewed all pertinent results/findings within the past 24 hours and my personal findings are:  CT abd and pelvis with no acute process

## 2023-08-01 NOTE — NURSING
Patient arrived on unit with ambulance services in stable condition via stretcher. Assisted patient to bed. Dr. Jeff notified that patient has arrived.     Nurses Note -- 4 Eyes      8/1/2023   07:25am      Skin assessed during: Admit      [] No Altered Skin Integrity Present    []Prevention Measures Documented      [x] Yes- Altered Skin Integrity Present or Discovered   [] LDA Added if Not in Epic (Describe Wound)   [x] New Altered Skin Integrity was Present on Admit and Documented in LDA   [x] Wound Image Taken    Wound Care Consulted? No    Attending Nurse: [id number]    Second RN/Staff Member:  [id number]

## 2023-08-01 NOTE — PLAN OF CARE
[facility]  Initial Discharge Assessment       Primary Care Provider: AIDEN CONNER NP    Admission Diagnosis: Displacement of vascular dialysis catheter, initial encounter [T82.42XA]  Abdominal pain [R10.9]    Admission Date: 8/1/2023  Expected Discharge Date: 8/2/2023    Discharge assessment completed with patient at bedside. Information verified as correct on facesheet. PCP at [facility] on [address]. Patient independent in ADLs with her RW. Denies HH/coumadin. Patient active with [facility] T/Th/S 11AM chair time. Parents drive patient to appts/HD. Patient denies any discharge needs at this time. Discharge plan is home with parents. Dad to provide transport on discharge.     Transition of Care Barriers: None    Payor: MEDICAID / Plan: HEALTHY BLUE ([employer] [place]) / Product Type: Managed Medicaid /     Extended Emergency Contact Information  Primary Emergency Contact: Tanya Howard  Address: [address] [place] [address]  Home Phone: [phone]  Mobile Phone: [phone]  Relation: Step parent  Preferred language: English   needed? No  Secondary Emergency Contact: Garcia Howard  Address: [address] [place] [address], [place] [address]  Mobile Phone: [phone]  Relation: Father  Preferred language: English   needed? No    Discharge Plan A: Home with family  Discharge Plan B: Home      [facility]  [address] [place] [address]  [facility] [address]  Phone: [phone] Fax: [phone]      Initial Assessment (most recent)       Adult Discharge Assessment - 08/01/23 1129          Discharge Assessment    Assessment Type Discharge Planning Assessment     Confirmed/corrected address, phone number and insurance Yes     Confirmed Demographics Correct on Facesheet     Source of Information patient     Does patient/caregiver understand observation status Yes     Communicated TATIANA with patient/caregiver Yes     Reason  For Admission abd pain     People in Home child(tammy), dependent;parent(s)     Facility Arrived From: home     Do you expect to return to your current living situation? Yes     Do you have help at home or someone to help you manage your care at home? Yes     Who are your caregiver(s) and their phone number(s)? parents     Prior to hospitilization cognitive status: Alert/Oriented     Current cognitive status: Alert/Oriented     Equipment Currently Used at Home walker, rolling;glucometer     Readmission within 30 days? No     Patient currently being followed by outpatient case management? No     Do you currently have service(s) that help you manage your care at home? No     Do you take prescription medications? Yes     Do you have prescription coverage? Yes     Coverage Healthy Blue     Do you have any problems affording any of your prescribed medications? No     Is the patient taking medications as prescribed? yes     Who is going to help you get home at discharge? parents/dad     How do you get to doctors appointments? family or friend will provide     Are you on dialysis? Yes     Dialysis Name and Scheduled days [facility]AM chair time     Do you take coumadin? No     Discharge Plan A Home with family     Discharge Plan B Home     DME Needed Upon Discharge  none     Discharge Plan discussed with: Patient     Transition of Care Barriers None

## 2023-08-01 NOTE — PROGRESS NOTES
3L net removed. Pt educated on importance of dialysis tx and Fluid overload prevention. Pt tolerated well.   08/01/23 1810 08/01/23 1820   Required for all Hemodialysis Patients   Hepatitis Status  --  negative   Vital Signs   Temp  --  97.9 °F (36.6 °C)   Temp Source  --  Oral   Pulse 80 82   Heart Rate Source  --  Monitor   Resp  --  18   SpO2  --  100 %   Device (Oxygen Therapy)  --  room air   BP (!) 143/83 (!) 152/80   MAP (mmHg) 109  --    BP Location  --  Left arm   BP Method  --  Automatic   Patient Position  --  Lying   Assessments (Pre/Post)   Consent Obtained  --  yes   Safety  --  vein preservation armband present   Date Hepatitis Profile Obtained  --  02/07/23   Blood Liters Processed (BLP)  --  60.6   Transport Modality  --  bed   Level of Consciousness (AVPU)  --  alert   Dialyzer Clearance  --  moderately streaked   Pre-Hemodialysis Assessment   Treatment Status Completed  --         Hemodialysis Catheter 06/17/23 1135 right internal jugular   Placement Date/Time: 06/17/23 1135   Present Prior to Hospital Arrival?: No  Hand Hygiene: Performed  Barrier Precautions: Performed  Skin Antisepsis: ChloraPrep  Hemodialysis Catheter Type: Tunneled catheter  Location: right internal jugular  Cathete...   Line Necessity Review  --  CRRT/HD   Site Assessment  --  No warmth;No swelling;No drainage;No redness   Line Securement Device  --  Secured with sutures   Dressing Type  --  CHG impregnated dressing/sponge;Central line dressing   Dressing Status  --  Clean;Dry;Intact   Dressing Intervention  --  Sterile dressing change   Date on Dressing  --  08/01/23   Dressing Due to be Changed  --  08/07/23   Venous Patency/Care  --  flushed w/o difficulty   Arterial Patency/Care  --  flushed w/o difficulty   During Hemodialysis Assessment   UF Removed (mL) 3500 mL  --    Intake (mL) 250 mL  --    Intra-Hemodialysis Comments Tx complete  --    Post-Hemodialysis Assessment   Rinseback Volume (mL)  --  250 mL   Blood Volume  Processed (Liters)  --  60.6 L   Dialyzer Clearance  --  Moderately streaked   Duration of Treatment  --  180 minutes   Additional Fluid Intake (mL)  --  500 mL   Total UF (mL)  --  3500 mL   Net Fluid Removal  --  3000   Patient Response to Treatment  --  Tolerated well   Post-Treatment Weight  --  51.9 kg (114 lb 6.7 oz)   Treatment Weight Change  --  -3   Post-Hemodialysis Comments  --  All blood returned to pt.

## 2023-08-01 NOTE — HPI
33 year old with prior history of ESRD on hemodialysis(T-Th-S),  gastroparesis, seizure, prior C diff infection, DMII, poor vision, sickle cell trait presented to an outside ER with abdominal pain, N/V.  She was found to be anemic with Hg 6.7.  She was transfused blood. CT abd and pelvis with IV contrast obtained and no acute process. She was transferred to [facility] for Nephrology and HD needs as she was due HD today.  Hg has improved to 8.2  after tranfusion.  Initial labs in the ED also revealing of AG 24, Glucose 210, CO2 18.  This has improved to CO2 23 and AG is improving. She may have been in early DKA and this improved with blood transfusion.  Initial LA is 2.7.  Repeat is 1.3.  Seen by Nephrology and HD will be performed today. Still having abdominal pain that is diffuse.  Not currently vomiting. Also reports back pain and pruritus to her back.  There is an area of abrasion where she rubbed her back against a wall in effort to make the itching stop.  She's had issues with anemia requiring periodic transfusions.  Per chart review, 4/2023 Fe 51, Ferritin 5959.  3/2023 Retic count was appropriately elevated. EGD last done 12/2022 and revealed gastritis with no active bleeding.  Colonoscopy last 8/2022 and was poor prep but no acute process seen and no bleeding.

## 2023-08-01 NOTE — NURSING
Back on unit in stable condition from dialysis. 3 liters removed and dialysis cath dressing changed.

## 2023-08-02 LAB
ALBUMIN SERPL BCP-MCNC: 3.4 G/DL (ref 3.5–5.2)
ALP SERPL-CCNC: 138 U/L (ref 55–135)
ALT SERPL W/O P-5'-P-CCNC: 22 U/L (ref 10–44)
ANION GAP SERPL CALC-SCNC: 10 MMOL/L (ref 8–16)
AST SERPL-CCNC: 31 U/L (ref 10–40)
BASOPHILS # BLD AUTO: 0.05 K/UL (ref 0–0.2)
BASOPHILS NFR BLD: 0.6 % (ref 0–1.9)
BILIRUB SERPL-MCNC: 2.4 MG/DL (ref 0.1–1)
BUN SERPL-MCNC: 24 MG/DL (ref 6–20)
CALCIUM SERPL-MCNC: 8.8 MG/DL (ref 8.7–10.5)
CHLORIDE SERPL-SCNC: 101 MMOL/L (ref 95–110)
CO2 SERPL-SCNC: 28 MMOL/L (ref 23–29)
CREAT SERPL-MCNC: 4.7 MG/DL (ref 0.5–1.4)
DIFFERENTIAL METHOD: ABNORMAL
EOSINOPHIL # BLD AUTO: 0.3 K/UL (ref 0–0.5)
EOSINOPHIL NFR BLD: 3.1 % (ref 0–8)
ERYTHROCYTE [DISTWIDTH] IN BLOOD BY AUTOMATED COUNT: 19.2 % (ref 11.5–14.5)
EST. GFR  (NO RACE VARIABLE): 11.8 ML/MIN/1.73 M^2
ESTIMATED AVG GLUCOSE: 120 MG/DL (ref 68–131)
GLUCOSE SERPL-MCNC: 103 MG/DL (ref 70–110)
GLUCOSE SERPL-MCNC: 118 MG/DL (ref 70–110)
GLUCOSE SERPL-MCNC: 133 MG/DL (ref 70–110)
GLUCOSE SERPL-MCNC: 85 MG/DL (ref 70–110)
HBA1C MFR BLD: 5.8 % (ref 4.5–6.2)
HCT VFR BLD AUTO: 22.9 % (ref 37–48.5)
HGB BLD-MCNC: 7.6 G/DL (ref 12–16)
IMM GRANULOCYTES # BLD AUTO: 0.06 K/UL (ref 0–0.04)
IMM GRANULOCYTES NFR BLD AUTO: 0.7 % (ref 0–0.5)
LYMPHOCYTES # BLD AUTO: 1.9 K/UL (ref 1–4.8)
LYMPHOCYTES NFR BLD: 23.1 % (ref 18–48)
MAGNESIUM SERPL-MCNC: 1.9 MG/DL (ref 1.6–2.6)
MCH RBC QN AUTO: 29.3 PG (ref 27–31)
MCHC RBC AUTO-ENTMCNC: 33.2 G/DL (ref 32–36)
MCV RBC AUTO: 88 FL (ref 82–98)
MONOCYTES # BLD AUTO: 0.8 K/UL (ref 0.3–1)
MONOCYTES NFR BLD: 9.6 % (ref 4–15)
NEUTROPHILS # BLD AUTO: 5.2 K/UL (ref 1.8–7.7)
NEUTROPHILS NFR BLD: 62.9 % (ref 38–73)
NRBC BLD-RTO: 5 /100 WBC
PLATELET # BLD AUTO: 93 K/UL (ref 150–450)
PMV BLD AUTO: 9.8 FL (ref 9.2–12.9)
POTASSIUM SERPL-SCNC: 3.6 MMOL/L (ref 3.5–5.1)
PROT SERPL-MCNC: 6.9 G/DL (ref 6–8.4)
RBC # BLD AUTO: 2.59 M/UL (ref 4–5.4)
SODIUM SERPL-SCNC: 139 MMOL/L (ref 136–145)
WBC # BLD AUTO: 8.27 K/UL (ref 3.9–12.7)

## 2023-08-02 PROCEDURE — 96375 TX/PRO/DX INJ NEW DRUG ADDON: CPT

## 2023-08-02 PROCEDURE — 82962 GLUCOSE BLOOD TEST: CPT

## 2023-08-02 PROCEDURE — G0378 HOSPITAL OBSERVATION PER HR: HCPCS

## 2023-08-02 PROCEDURE — 63600175 PHARM REV CODE 636 W HCPCS: Performed by: INTERNAL MEDICINE

## 2023-08-02 PROCEDURE — 12000002 HC ACUTE/MED SURGE SEMI-PRIVATE ROOM

## 2023-08-02 PROCEDURE — 80053 COMPREHEN METABOLIC PANEL: CPT | Performed by: INTERNAL MEDICINE

## 2023-08-02 PROCEDURE — 36415 COLL VENOUS BLD VENIPUNCTURE: CPT | Performed by: INTERNAL MEDICINE

## 2023-08-02 PROCEDURE — 96376 TX/PRO/DX INJ SAME DRUG ADON: CPT | Mod: 59

## 2023-08-02 PROCEDURE — 25000003 PHARM REV CODE 250: Performed by: INTERNAL MEDICINE

## 2023-08-02 PROCEDURE — 85025 COMPLETE CBC W/AUTO DIFF WBC: CPT | Performed by: INTERNAL MEDICINE

## 2023-08-02 PROCEDURE — 83735 ASSAY OF MAGNESIUM: CPT | Performed by: INTERNAL MEDICINE

## 2023-08-02 RX ORDER — METOCLOPRAMIDE HYDROCHLORIDE 5 MG/ML
10 INJECTION INTRAMUSCULAR; INTRAVENOUS 3 TIMES DAILY
Status: DISCONTINUED | OUTPATIENT
Start: 2023-08-02 | End: 2023-08-03 | Stop reason: HOSPADM

## 2023-08-02 RX ADMIN — FLUOXETINE 20 MG: 20 CAPSULE ORAL at 10:08

## 2023-08-02 RX ADMIN — ONDANSETRON 4 MG: 2 INJECTION INTRAMUSCULAR; INTRAVENOUS at 10:08

## 2023-08-02 RX ADMIN — INSULIN DETEMIR 18 UNITS: 100 INJECTION, SOLUTION SUBCUTANEOUS at 08:08

## 2023-08-02 RX ADMIN — HYDRALAZINE HYDROCHLORIDE 100 MG: 25 TABLET ORAL at 10:08

## 2023-08-02 RX ADMIN — ISOSORBIDE MONONITRATE 30 MG: 30 TABLET, EXTENDED RELEASE ORAL at 11:08

## 2023-08-02 RX ADMIN — GABAPENTIN 600 MG: 300 CAPSULE ORAL at 11:08

## 2023-08-02 RX ADMIN — METOCLOPRAMIDE 10 MG: 5 INJECTION, SOLUTION INTRAMUSCULAR; INTRAVENOUS at 08:08

## 2023-08-02 RX ADMIN — MUPIROCIN 1 G: 20 OINTMENT TOPICAL at 08:08

## 2023-08-02 RX ADMIN — METOCLOPRAMIDE 10 MG: 5 INJECTION, SOLUTION INTRAMUSCULAR; INTRAVENOUS at 05:08

## 2023-08-02 RX ADMIN — METOCLOPRAMIDE 10 MG: 5 INJECTION, SOLUTION INTRAMUSCULAR; INTRAVENOUS at 11:08

## 2023-08-02 RX ADMIN — HYDRALAZINE HYDROCHLORIDE 100 MG: 25 TABLET ORAL at 08:08

## 2023-08-02 RX ADMIN — AMLODIPINE BESYLATE 5 MG: 5 TABLET ORAL at 10:08

## 2023-08-02 RX ADMIN — MUPIROCIN 1 G: 20 OINTMENT TOPICAL at 10:08

## 2023-08-02 RX ADMIN — CARVEDILOL 25 MG: 25 TABLET, FILM COATED ORAL at 10:08

## 2023-08-02 RX ADMIN — LEVETIRACETAM 500 MG: 500 TABLET, FILM COATED ORAL at 08:08

## 2023-08-02 RX ADMIN — GABAPENTIN 600 MG: 300 CAPSULE ORAL at 08:08

## 2023-08-02 RX ADMIN — CARVEDILOL 25 MG: 25 TABLET, FILM COATED ORAL at 08:08

## 2023-08-02 RX ADMIN — APIXABAN 5 MG: 5 TABLET, FILM COATED ORAL at 11:08

## 2023-08-02 RX ADMIN — HYDROCODONE BITARTRATE AND ACETAMINOPHEN 1 TABLET: 7.5; 325 TABLET ORAL at 05:08

## 2023-08-02 RX ADMIN — PANTOPRAZOLE SODIUM 40 MG: 40 TABLET, DELAYED RELEASE ORAL at 05:08

## 2023-08-02 RX ADMIN — APIXABAN 5 MG: 5 TABLET, FILM COATED ORAL at 08:08

## 2023-08-02 RX ADMIN — ACETAMINOPHEN 650 MG: 325 TABLET ORAL at 08:08

## 2023-08-02 RX ADMIN — HYDROCODONE BITARTRATE AND ACETAMINOPHEN 1 TABLET: 7.5; 325 TABLET ORAL at 11:08

## 2023-08-02 RX ADMIN — LEVETIRACETAM 500 MG: 500 TABLET, FILM COATED ORAL at 11:08

## 2023-08-02 NOTE — PROGRESS NOTES
[facility] Medicine  Progress Note    Patient name: Tabby Howard  MRN: [id number]  Admit Date: 8/1/2023   LOS: 1 day     SUBJECTIVE:     Principal problem: Abdominal pain    Interval History:  33 year old with prior history of ESRD on hemodialysis(T-Th-S),  gastroparesis, seizure, prior C diff infection, DMII, poor vision, sickle cell trait presented to an outside ER with abdominal pain, N/V.  She was found to be anemic with Hg 6.7.  She was transfused blood. CT abd and pelvis with IV contrast obtained and no acute process. She was transferred to [facility] for Nephrology and HD needs as she was due HD.  Hg has improved to 8.2  after tranfusion.  Initial labs in the ED also revealing of AG 24, Glucose 210, CO2 18.  This has improved to CO2 23 and AG is improving. She may have been in early DKA and this improved with blood transfusion.  Initial LA is 2.7.  Repeat is 1.3.  Seen by Nephrology and HD will be performed per routine schedule. Still having abdominal pain that is diffuse.  Now vomiting. Also reports back pain and pruritus to her back.  There is an area of abrasion where she rubbed her back against a wall in effort to make the itching stop.  She's had issues with anemia requiring periodic transfusions.  Per chart review, 4/2023 Fe 51, Ferritin 5959.  3/2023 Retic count was appropriately elevated. EGD last done 12/2022 and revealed gastritis with no active bleeding.  Colonoscopy last 8/2022 and was poor prep but no acute process seen and no bleeding.     Today, still rocking back and forth on exam with pain.  Vomiting x 2 this morning.  Zofran administered this morning.  Hg trended down to 7.6.  occult blood from outside facility negative two days ago.  Has not been able to provide a new sample here.     Hospital Course:    Scheduled Meds:   sodium chloride 0.9%   Intravenous Once    amLODIPine  5 mg Oral Daily    apixaban  5 mg Oral BID    carvediloL  25 mg Oral BID    FLUoxetine  20 mg Oral  Daily    gabapentin  600 mg Oral BID    hydrALAZINE  100 mg Oral BID    insulin detemir U-100 (Levemir)  18 Units Subcutaneous QHS    isosorbide mononitrate  30 mg Oral Daily    levETIRAcetam  500 mg Oral BID    metoclopramide HCl  10 mg Intravenous TID    mupirocin   Nasal BID    pantoprazole  40 mg Oral Before breakfast     Continuous Infusions:  PRN Meds:sodium chloride 0.9%, acetaminophen, dextrose 50%, dextrose 50%, dicyclomine, diphenhydrAMINE, glucagon (human recombinant), glucose, glucose, HYDROcodone-acetaminophen, insulin aspart U-100, melatonin, naloxone, ondansetron, polyethylene glycol, sodium chloride 0.9%, sodium chloride 0.9%    Review of patient's allergies indicates:   Allergen Reactions    Penicillins Hives       Review of Systems: As per interval history    OBJECTIVE:     Vital Signs (Most Recent)  Temp: 99 °F (37.2 °C) (08/02/23 0718)  Pulse: 85 (08/02/23 0718)  Resp: 20 (08/02/23 0718)  BP: 132/83 (08/02/23 0718)  SpO2: (!) 94 % (08/02/23 0718)    Vital Signs Range (Last 24H):  Temp:  [97.9 °F (36.6 °C)-99 °F (37.2 °C)]   Pulse:  [78-93]   Resp:  [18-20]   BP: (113-177)/(64-92)   SpO2:  [94 %-100 %]     I & O (Last 24H):  Intake/Output Summary (Last 24 hours) at 8/2/2023 1045  Last data filed at 8/1/2023 1820  Gross per 24 hour   Intake 700 ml   Output 3500 ml   Net -2800 ml       Physical Exam:    Vitals and nursing note reviewed.     Constitutional:       General: Rocking back and forth in the bed with abdominal pain.      Appearance: Well-developed.   HENT:      Head: Normocephalic and atraumatic.   Eyes:      Pupils: Pupils are equal, round, and reactive to light.   Cardiovascular:      Rate and Rhythm: Regular rhythm.   Pulmonary:      Effort: Pulmonary effort is normal.       Abdominal:      General: There is no distension.     Musculoskeletal:         General: Normal range of motion.      Cervical back: Normal range of motion.   Skin:     Findings: No rash.   Neurological:      Mental  Status: Alert and oriented to person, place, and time.          Laboratory:  I have reviewed all pertinent lab results within the past 24 hours.  CBC:   Recent Labs   Lab 08/02/23  0437   WBC 8.27   RBC 2.59*   HGB 7.6*   HCT 22.9*   PLT 93*   MCV 88   MCH 29.3   MCHC 33.2     CMP:   Recent Labs   Lab 08/02/23  0437   GLU 85   CALCIUM 8.8   ALBUMIN 3.4*   PROT 6.9      K 3.6   CO2 28      BUN 24*   CREATININE 4.7*   ALKPHOS 138*   ALT 22   AST 31   BILITOT 2.4*       Diagnostic Results:      ASSESSMENT/PLAN:         Active Hospital Problems    Diagnosis  POA    *Abdominal pain [R10.9]  Yes    Hypertension [I10]  Yes    Anemia due to chronic kidney disease, on chronic dialysis [N18.6, D63.1, Z99.2]  Not Applicable    Thrombocytopenia [D69.6]  Yes     Chronic    ESRD (end stage renal disease) [N18.6]  Yes    Seizure disorder [G40.909]  Yes     Chronic    Diabetes mellitus due to underlying condition with unspecified complications [E08.8]  Yes      Resolved Hospital Problems   No resolved problems to display.         Plan:     -Etiology of abdominal pain is unclear. This could have been early DKA as demonstrated by labs.  AG and bicarb is improved but she still has pain.  This could gastroparesis.  Part of a sickle cell crisis? Has HbS trait. The etiology of her pain has not been elucidated over multiple hospitalizations. CT abd and pelvis with no obstruction or other acute process.  No colitis, etc.  -prn percocet for pain. Bentyl added for spasms.  On PPI.  -Jessica added Reglan 8/2 given persistent pain and vomiting. This does lower the seizure threshold and thus will monitor closely and plan to wean off as soon as possible if improving or stop soon if not showing any improvement.   -Volume expansion with blood.  LA improved.  -HD per routine.  Nephrology consulted and following.  -Chronic anemia.  Workup as per HPI.  Suspected to be multifactorial and due to ESRD state, sickle cell trait, etc. Trending H/H.   Slow trend down.  Occult blood at outside facility negative. Will trend. Further plans per clinical course and if Hg stabilizes.   -Levemir. ISS. Accuchecks.  -Anticoagulated with Eliquis given DVT.  No active bleeding seen to suggest this is the source of anemia and thus I am electing to continue Eliquis. If continues to trend down, this may need to be revisited.          VTE Risk Mitigation (From admission, onward)           Ordered     apixaban tablet 5 mg  2 times daily         08/01/23 0732     IP VTE HIGH RISK PATIENT  Once         08/01/23 0732     Place sequential compression device  Until discontinued         08/01/23 0732                      Department Hospital Medicine  [facility]  Bryant Jeff MD  Date of service: 08/02/2023

## 2023-08-02 NOTE — PLAN OF CARE
1007 - Email sent to Veronica at [email or web address] requesting hospital follow up appt.        08/02/23 1007   Post-Acute Status   Post-Acute Authorization Other   Other Status Awaiting f/u Appts

## 2023-08-03 VITALS
WEIGHT: 120.94 LBS | TEMPERATURE: 98 F | HEART RATE: 80 BPM | HEIGHT: 62 IN | RESPIRATION RATE: 18 BRPM | SYSTOLIC BLOOD PRESSURE: 132 MMHG | DIASTOLIC BLOOD PRESSURE: 87 MMHG | OXYGEN SATURATION: 95 % | BODY MASS INDEX: 22.26 KG/M2

## 2023-08-03 PROBLEM — R10.9 ABDOMINAL PAIN: Status: RESOLVED | Noted: 2023-01-12 | Resolved: 2023-08-03

## 2023-08-03 LAB
ALBUMIN SERPL BCP-MCNC: 3.5 G/DL (ref 3.5–5.2)
ALP SERPL-CCNC: 141 U/L (ref 55–135)
ALT SERPL W/O P-5'-P-CCNC: 17 U/L (ref 10–44)
ANION GAP SERPL CALC-SCNC: 11 MMOL/L (ref 8–16)
AST SERPL-CCNC: 27 U/L (ref 10–40)
BASOPHILS # BLD AUTO: 0.07 K/UL (ref 0–0.2)
BASOPHILS NFR BLD: 0.8 % (ref 0–1.9)
BILIRUB SERPL-MCNC: 1.7 MG/DL (ref 0.1–1)
BUN SERPL-MCNC: 40 MG/DL (ref 6–20)
CALCIUM SERPL-MCNC: 8.5 MG/DL (ref 8.7–10.5)
CHLORIDE SERPL-SCNC: 99 MMOL/L (ref 95–110)
CO2 SERPL-SCNC: 26 MMOL/L (ref 23–29)
CREAT SERPL-MCNC: 6.9 MG/DL (ref 0.5–1.4)
DIFFERENTIAL METHOD: ABNORMAL
EOSINOPHIL # BLD AUTO: 0.1 K/UL (ref 0–0.5)
EOSINOPHIL NFR BLD: 1 % (ref 0–8)
ERYTHROCYTE [DISTWIDTH] IN BLOOD BY AUTOMATED COUNT: 21.3 % (ref 11.5–14.5)
EST. GFR  (NO RACE VARIABLE): 7.5 ML/MIN/1.73 M^2
GLUCOSE SERPL-MCNC: 212 MG/DL (ref 70–110)
GLUCOSE SERPL-MCNC: 37 MG/DL (ref 70–110)
GLUCOSE SERPL-MCNC: 46 MG/DL (ref 70–110)
GLUCOSE SERPL-MCNC: 66 MG/DL (ref 70–110)
GLUCOSE SERPL-MCNC: 74 MG/DL (ref 70–110)
GLUCOSE SERPL-MCNC: 75 MG/DL (ref 70–110)
GLUCOSE SERPL-MCNC: 83 MG/DL (ref 70–110)
HCT VFR BLD AUTO: 23.5 % (ref 37–48.5)
HGB BLD-MCNC: 8 G/DL (ref 12–16)
IMM GRANULOCYTES # BLD AUTO: 0.07 K/UL (ref 0–0.04)
IMM GRANULOCYTES NFR BLD AUTO: 0.8 % (ref 0–0.5)
LYMPHOCYTES # BLD AUTO: 1.7 K/UL (ref 1–4.8)
LYMPHOCYTES NFR BLD: 19.4 % (ref 18–48)
MAGNESIUM SERPL-MCNC: 2.3 MG/DL (ref 1.6–2.6)
MCH RBC QN AUTO: 30.4 PG (ref 27–31)
MCHC RBC AUTO-ENTMCNC: 34 G/DL (ref 32–36)
MCV RBC AUTO: 89 FL (ref 82–98)
MONOCYTES # BLD AUTO: 0.9 K/UL (ref 0.3–1)
MONOCYTES NFR BLD: 9.7 % (ref 4–15)
NEUTROPHILS # BLD AUTO: 6 K/UL (ref 1.8–7.7)
NEUTROPHILS NFR BLD: 68.3 % (ref 38–73)
NRBC BLD-RTO: 8 /100 WBC
PLATELET # BLD AUTO: 110 K/UL (ref 150–450)
PMV BLD AUTO: 9.8 FL (ref 9.2–12.9)
POTASSIUM SERPL-SCNC: 3.9 MMOL/L (ref 3.5–5.1)
PROT SERPL-MCNC: 6.8 G/DL (ref 6–8.4)
RBC # BLD AUTO: 2.63 M/UL (ref 4–5.4)
SODIUM SERPL-SCNC: 136 MMOL/L (ref 136–145)
WBC # BLD AUTO: 8.72 K/UL (ref 3.9–12.7)

## 2023-08-03 PROCEDURE — 63600175 PHARM REV CODE 636 W HCPCS: Performed by: INTERNAL MEDICINE

## 2023-08-03 PROCEDURE — 83735 ASSAY OF MAGNESIUM: CPT | Performed by: INTERNAL MEDICINE

## 2023-08-03 PROCEDURE — 63600175 PHARM REV CODE 636 W HCPCS: Mod: JZ | Performed by: INTERNAL MEDICINE

## 2023-08-03 PROCEDURE — 82947 ASSAY GLUCOSE BLOOD QUANT: CPT | Performed by: INTERNAL MEDICINE

## 2023-08-03 PROCEDURE — 25000003 PHARM REV CODE 250: Performed by: INTERNAL MEDICINE

## 2023-08-03 PROCEDURE — G0378 HOSPITAL OBSERVATION PER HR: HCPCS

## 2023-08-03 PROCEDURE — 90935 HEMODIALYSIS ONE EVALUATION: CPT

## 2023-08-03 PROCEDURE — 85025 COMPLETE CBC W/AUTO DIFF WBC: CPT | Performed by: INTERNAL MEDICINE

## 2023-08-03 PROCEDURE — 36415 COLL VENOUS BLD VENIPUNCTURE: CPT | Performed by: INTERNAL MEDICINE

## 2023-08-03 PROCEDURE — 80053 COMPREHEN METABOLIC PANEL: CPT | Performed by: INTERNAL MEDICINE

## 2023-08-03 PROCEDURE — 96376 TX/PRO/DX INJ SAME DRUG ADON: CPT

## 2023-08-03 PROCEDURE — 96372 THER/PROPH/DIAG INJ SC/IM: CPT | Mod: 59 | Performed by: INTERNAL MEDICINE

## 2023-08-03 RX ORDER — SODIUM CHLORIDE 9 MG/ML
INJECTION, SOLUTION INTRAVENOUS
Status: CANCELLED | OUTPATIENT
Start: 2023-08-03

## 2023-08-03 RX ORDER — SODIUM CHLORIDE 9 MG/ML
INJECTION, SOLUTION INTRAVENOUS ONCE
Status: CANCELLED | OUTPATIENT
Start: 2023-08-03 | End: 2023-08-03

## 2023-08-03 RX ADMIN — LEVETIRACETAM 500 MG: 500 TABLET, FILM COATED ORAL at 08:08

## 2023-08-03 RX ADMIN — PANTOPRAZOLE SODIUM 40 MG: 40 TABLET, DELAYED RELEASE ORAL at 05:08

## 2023-08-03 RX ADMIN — METOCLOPRAMIDE 10 MG: 5 INJECTION, SOLUTION INTRAMUSCULAR; INTRAVENOUS at 08:08

## 2023-08-03 RX ADMIN — EPOETIN ALFA-EPBX 5500 UNITS: 10000 INJECTION, SOLUTION INTRAVENOUS; SUBCUTANEOUS at 04:08

## 2023-08-03 RX ADMIN — FLUOXETINE 20 MG: 20 CAPSULE ORAL at 09:08

## 2023-08-03 RX ADMIN — HYDROCODONE BITARTRATE AND ACETAMINOPHEN 1 TABLET: 7.5; 325 TABLET ORAL at 05:08

## 2023-08-03 RX ADMIN — GABAPENTIN 600 MG: 300 CAPSULE ORAL at 08:08

## 2023-08-03 RX ADMIN — ISOSORBIDE MONONITRATE 30 MG: 30 TABLET, EXTENDED RELEASE ORAL at 08:08

## 2023-08-03 RX ADMIN — MUPIROCIN 1 G: 20 OINTMENT TOPICAL at 09:08

## 2023-08-03 RX ADMIN — Medication 24 G: at 01:08

## 2023-08-03 RX ADMIN — APIXABAN 5 MG: 5 TABLET, FILM COATED ORAL at 08:08

## 2023-08-03 NOTE — ASSESSMENT & PLAN NOTE
Discharge home with outpatient follow-up after RRT today; continue preadmission regimen as listed below in discharge med rec; Renal diet

## 2023-08-03 NOTE — NURSING
1. Call [facility] [phone] for DBT   2. Go to Dr. Kalie Webster on  for medication management  3.  Return to clinic for Dr. Tammy Arredondo in 3-4 weeks Patient going to dialysis.

## 2023-08-03 NOTE — HOSPITAL COURSE
" 33 year old with prior history of ESRD on hemodialysis(T-Th-S),  gastroparesis, seizure, prior C diff infection, DMII, poor vision, sickle cell trait presented to an outside ER with abdominal pain, N/V.  She was found to be anemic with Hg 6.7.  She was transfused blood. CT abd and pelvis with IV contrast obtained and no acute process. She was transferred to [facility] for Nephrology and HD needs as she was due HD.  Hg has improved to 8.2  after tranfusion.  Initial labs in the ED also revealing of AG 24, Glucose 210, CO2 18.  This has improved to CO2 23 and AG is improving. She may have been in early DKA and this improved with blood transfusion.  Initial LA is 2.7.  Repeat is 1.3.  Seen by Nephrology and HD will be performed per routine schedule. Still having abdominal pain that is diffuse.  Now vomiting. Also reports back pain and pruritus to her back.  There is an area of abrasion where she rubbed her back against a wall in effort to make the itching stop.  She's had issues with anemia requiring periodic transfusions.  Per chart review, 4/2023 Fe 51, Ferritin 5959.  3/2023 Retic count was appropriately elevated. EGD last done 12/2022 and revealed gastritis with no active bleeding.  Colonoscopy last 8/2022 and was poor prep but no acute process seen and no bleeding.     08/03  Assumed care. Patient feels "Much better". Ate breakfast without issue. Would like to be discharge home with outpatient follow-up after RRT today.  VSS  Lungs: no adventitious sounds  Heart S1S2 reg  Abdo soft  Imp ESRD, Abdominal Pain (Resolved)  Plan: Discharge home with outpatient follow-up after RRT today; continue preadmission regimen as listed below in discharge med rec; Renal diet  "

## 2023-08-03 NOTE — CONSULTS
INPATIENT NEPHROLOGY CONSULT   [facility] NEPHROLOGY    Tabby Howard  08/03/2023    Reason for consultation:  esrd    Chief Complaint: No chief complaint on file.         History of Present Illness:     Patient comes our facility with complaints of lower abdominal pain affecting her suprapubic area as well as both lower quadrants.  Patient claims that the pain is sharp in nature.  It started yesterday and has been continuous.  Patient has had a history of chronic abdominal pain with known history of gastroparesis.  Patient states this pain is different as typically her pain is crampy in nature and this is sharp.  Additionally, her usual abdominal pain is epigastric and this was lower abdominal.  Triage listed flank pain as a chief complaint but patient describes bilateral paraspinal lumbar pain rather than flank pain.  Patient is a dialysis patient and received this on Tuesday, Thursday, Saturday.  Last dialysis 2 days prior.  Patient's nephrologist is Mohsen Sommer.  PCP is out of [place].  Patient denies any associated fevers or chills.  She has had some nausea with emesis mildly improved Zofran.  Patient denies any diarrhea.  She had a normal bowel today.  Patient denies any rectal pain.  Patient denies any gross blood per rectum.  Patient does not make any urine due to her end-stage renal disease.  Patient denies any chest pains, dyspnea, consumption of unusual or suspect foods.    8/1  consulted for dialysis management.  Bp better this am. C/o abd pain.  Always c/o abd pain for last two years.     8/2  AFVSS.  Abdominal pain persists.    8/3  feeling better.  Wants to go home after dialysis    Plan of Care:       Assessment:    esrd  --continue dialysis per routine  --fluid restrict  --renal dose medication per routine  --continue outpt medication  --continue binders with meals    Anemia  --erythropoiesis stimulating agent with renal replacement therapy    Hyperphosphatemia  --renal diet  --continue  binders    Hypertension  --uf with hd  --fluid restrict  --low salt diet  --continue home medication         Thank you for allowing us to participate in this patient's care. We will continue to follow.    Vital Signs:  Temp Readings from Last 3 Encounters:   23 97.9 °F (36.6 °C) (Oral)   23 97.7 °F (36.5 °C)   07/10/23 97.9 °F (36.6 °C) (Temporal)       Pulse Readings from Last 3 Encounters:   23 67   23 80   23 81       BP Readings from Last 3 Encounters:   23 109/65   23 (!) 152/97   23 (!) 198/120       Weight:  Wt Readings from Last 3 Encounters:   23 54.9 kg (120 lb 15.2 oz)   23 50.8 kg (112 lb)   07/10/23 57.2 kg (126 lb)       Past Medical & Surgical History:  Past Medical History:   Diagnosis Date    ESRD on hemodialysis 2022    Gastritis 2022    EGD was 22    Gastroparesis 2022    has not had Emptying study    Heart failure with preserved ejection fraction 2022    EF 55% on 3/22    History of supraventricular tachycardia     Hyperkalemia 2022    Hypertensive emergency 2022    Sickle cell trait 2022    Type 2 diabetes mellitus        Past Surgical History:   Procedure Laterality Date     SECTION      x 3    COLONOSCOPY      COLONOSCOPY N/A 2022    Procedure: COLONOSCOPY;  Surgeon: Jagdeep Cedeno MD;  Location: [facility];  Service: Endoscopy;  Laterality: N/A;    ESOPHAGOGASTRODUODENOSCOPY N/A 10/18/2019    Procedure: ESOPHAGOGASTRODUODENOSCOPY (EGD);  Surgeon: Gianluca Mendez MD;  Location: [facility];  Service: Endoscopy;  Laterality: N/A;    ESOPHAGOGASTRODUODENOSCOPY N/A 2022    Procedure: EGD (ESOPHAGOGASTRODUODENOSCOPY);  Surgeon: Micky Paredes III, MD;  Location: [facility];  Service: Endoscopy;  Laterality: N/A;    ESOPHAGOGASTRODUODENOSCOPY N/A 2022    Procedure: EGD (ESOPHAGOGASTRODUODENOSCOPY);  Surgeon: Marcelo Zhong MD;  Location: [facility];  Service:  Endoscopy;  Laterality: N/A;    INSERTION, CATHETER, TUNNELED N/A 6/17/2023    Procedure: Insertion,catheter,tunneled;  Surgeon: Carlos Thurman Jr., MD;  Location: [facility] OR;  Service: General;  Laterality: N/A;    LAPAROSCOPIC CHOLECYSTECTOMY N/A 07/30/2022    Procedure: CHOLECYSTECTOMY, LAPAROSCOPIC;  Surgeon: Grey Perez MD;  Location: [facility] OR;  Service: General;  Laterality: N/A;    PLACEMENT OF DUAL-LUMEN VASCULAR CATHETER Left 07/12/2022    Procedure: INSERTION-CATHETER-JOSEPH;  Surgeon: Dionte Gan MD;  Location: [facility] OR;  Service: General;  Laterality: Left;    PLACEMENT OF DUAL-LUMEN VASCULAR CATHETER Right 07/26/2022    Procedure: INSERTION-CATHETER-Hemosplit;  Surgeon: Dionte Gan MD;  Location: [facility] OR;  Service: General;  Laterality: Right;       Past Social History:  Social History     Socioeconomic History    Marital status:    Tobacco Use    Smoking status: Never    Smokeless tobacco: Never   Substance and Sexual Activity    Alcohol use: Not Currently    Drug use: No    Sexual activity: Not Currently     Partners: Male     Birth control/protection: I.U.D.     Social Determinants of Health     Financial Resource Strain: Low Risk  (5/16/2023)    Overall Financial Resource Strain (CARDIA)     Difficulty of Paying Living Expenses: Not very hard   Food Insecurity: No Food Insecurity (5/16/2023)    Hunger Vital Sign     Worried About Running Out of Food in the Last Year: Never true     Ran Out of Food in the Last Year: Never true   Transportation Needs: No Transportation Needs (5/16/2023)    PRAPARE - Transportation     Lack of Transportation (Medical): No     Lack of Transportation (Non-Medical): No   Physical Activity: Inactive (5/16/2023)    Exercise Vital Sign     Days of Exercise per Week: 0 days     Minutes of Exercise per Session: 0 min   Stress: No Stress Concern Present (5/16/2023)    [employer] - Occupational Stress Questionnaire     Feeling  of Stress : Not at all   Social Connections: Unknown (5/16/2023)    Social Connection and Isolation Panel [NHANES]     Frequency of Communication with Friends and Family: More than three times a week     Frequency of Social Gatherings with Friends and Family: More than three times a week     Attends [employer] Services: Never     Active Member of Clubs or Organizations: No     Attends Club or Organization Meetings: Never     Marital Status: Patient refused   Housing Stability: Low Risk  (5/16/2023)    Housing Stability Vital Sign     Unable to Pay for Housing in the Last Year: No     Number of Places Lived in the Last Year: 1     Unstable Housing in the Last Year: No       Medications:  No current facility-administered medications on file prior to encounter.     Current Outpatient Medications on File Prior to Encounter   Medication Sig Dispense Refill    amLODIPine (NORVASC) 5 MG tablet Take 1 tablet every day by oral route for 90 days. 90 tablet 1    apixaban (ELIQUIS) 5 mg Tab Take 1 tablet (5 mg total) by mouth 2 (two) times daily. 60 tablet 2    carvediloL (COREG) 25 MG tablet Take 1 tablet (25 mg total) by mouth 2 (two) times daily. 60 tablet 5    FLUoxetine 20 MG capsule Take 1 capsule (20 mg total) by mouth once daily. 30 capsule 5    hydrALAZINE (APRESOLINE) 100 MG tablet Take 1 tablet (100 mg total) by mouth 2 (two) times a day. 60 tablet 2    HYDROcodone-acetaminophen (NORCO) 7.5-325 mg per tablet Take 1 tablet by mouth every 6 (six) hours as needed for Pain. 30 tablet 0    insulin aspart U-100 (NOVOLOG) 100 unit/mL (3 mL) InPn pen Inject 4 Units into the skin 3 (three) times daily with meals. 15 mL 3    insulin detemir U-100, Levemir, (LEVEMIR FLEXPEN) 100 unit/mL (3 mL) InPn pen Inject 18 Units into the skin every evening. 9 mL 5    isosorbide mononitrate (IMDUR) 30 MG 24 hr tablet Take 1 tablet (30 mg total) by mouth once daily. 30 tablet 1    levETIRAcetam (KEPPRA) 500 MG Tab Take 1 tablet twice a day  by oral route for 30 days. 60 tablet 2    ondansetron (ZOFRAN) 4 MG tablet Take 1 tablet (4 mg total) by mouth every 6 (six) hours as needed for Nausea. 12 tablet 0    pantoprazole (PROTONIX) 40 MG tablet Take 1 tablet every day by oral route for 90 days. 90 tablet 1    promethazine (PHENERGAN) 25 MG suppository Place 1 suppository (25 mg total) rectally every 6 (six) hours as needed for Nausea. 10 suppository 0    sucralfate (CARAFATE) 100 mg/mL suspension Take 10 mLs (1,000 mg total) by mouth 4 (four) times daily. 1200 mL 1    [DISCONTINUED] gabapentin (NEURONTIN) 300 MG capsule Take 2 capsules (600 mg total) by mouth 2 (two) times a day. 120 capsule 1    amLODIPine (NORVASC) 5 MG tablet Take 1 tablet (5 mg total) by mouth once daily. 30 tablet 1    apixaban (ELIQUIS) 5 mg Tab Take 1 tablet (5 mg total) by mouth 2 (two) times daily. 60 tablet 2    apixaban (ELIQUIS) 5 mg Tab Take 1 tablet twice a day by oral route for 90 days. 180 tablet 1    FLUoxetine 20 MG capsule Take 1 capsule every day by oral route for 90 days. 90 capsule 1    gabapentin (NEURONTIN) 300 MG capsule Take 2 capsules twice a day by oral route for 90 days. 360 capsule 1    hydrALAZINE (APRESOLINE) 100 MG tablet Take 1 tablet (100 mg total) by mouth every 8 (eight) hours. 60 tablet 0    hydrALAZINE (APRESOLINE) 100 MG tablet Take 1 tablet twice a day by oral route for 90 days. 180 tablet 1    insulin aspart U-100 (NOVOLOG FLEXPEN U-100 INSULIN) 100 unit/mL (3 mL) InPn pen Inject 5 Units into the skin 3 (three) times daily before meals. 15 mL 2    insulin aspart U-100 (NOVOLOG FLEXPEN U-100 INSULIN) 100 unit/mL (3 mL) InPn pen Inject 8 units 3 times a day by subcutaneous route before meals for 90 days. 15 mL 1    insulin detemir U-100, Levemir, (LEVEMIR FLEXPEN) 100 unit/mL (3 mL) InPn pen Inject 18 units into the skin every evening 15 mL 1    insulin detemir U-100, Levemir, 100 unit/mL (3 mL) SubQ InPn pen Inject 18 Units into the skin once  "daily. 15 mL 3    isosorbide mononitrate (IMDUR) 30 MG 24 hr tablet Take 3 tablets (90 mg total) by mouth once daily. 90 tablet 11    isosorbide mononitrate (IMDUR) 30 MG 24 hr tablet Take 1 tablet every day by oral route for 90 days. 90 tablet 1    lancets 33 gauge Misc Use to test twice daily 100 each 5    ondansetron (ZOFRAN-ODT) 4 MG TbDL Place 1 tablet (4 mg) on or under the tongue every 8 hours by translingual route for 10 days. 24 tablet 2    pantoprazole (PROTONIX) 40 MG tablet Take 1 tablet every day by oral route for 30 days. 30 tablet 2    pantoprazole (PROTONIX) 40 MG tablet Take 1 tablet every day by oral route for 30 days. 30 tablet 2    pen needle, diabetic 31 gauge x 3/16" Ndle Use as directed to inject insulin 5 times daily 100 each 11    promethazine (PHENERGAN) 25 MG suppository Place 1 suppository (25 mg total) rectally every 6 (six) hours as needed for Nausea. 10 suppository 0    sucralfate (CARAFATE) 100 mg/mL suspension Take 10 mL 4 times a day by oral route before meals for 30 days. 1260 mL 1    [DISCONTINUED] atenoloL (TENORMIN) 50 MG tablet Take 1 tablet (50 mg total) by mouth every other day. 45 tablet 3    [DISCONTINUED] gabapentin (NEURONTIN) 100 MG capsule Take 1 capsule by mouth 3 times daily 90 capsule 2    [DISCONTINUED] omeprazole (PRILOSEC) 20 MG capsule Take 2 capsules (40 mg total) by mouth once daily. for 10 days 20 capsule 0     Scheduled Meds:   sodium chloride 0.9%   Intravenous Once    amLODIPine  5 mg Oral Daily    apixaban  5 mg Oral BID    carvediloL  25 mg Oral BID    epoetin teresa-epbx  100 Units/kg Subcutaneous Every Tues, Thurs, Sat    FLUoxetine  20 mg Oral Daily    gabapentin  600 mg Oral BID    hydrALAZINE  100 mg Oral BID    insulin detemir U-100 (Levemir)  18 Units Subcutaneous QHS    isosorbide mononitrate  30 mg Oral Daily    levETIRAcetam  500 mg Oral BID    metoclopramide HCl  10 mg Intravenous TID    mupirocin   Nasal BID    pantoprazole  40 mg Oral Before " "breakfast     Continuous Infusions:  PRN Meds:.sodium chloride 0.9%, acetaminophen, dextrose 50%, dextrose 50%, dicyclomine, diphenhydrAMINE, glucagon (human recombinant), glucose, glucose, HYDROcodone-acetaminophen, insulin aspart U-100, melatonin, naloxone, ondansetron, polyethylene glycol, sodium chloride 0.9%, sodium chloride 0.9%    Allergies:  Penicillins    Past Family History:  Reviewed; refer to Hospitalist Admission Note    Review of Systems:  Review of Systems - All 14 systems reviewed and negative, except as noted in HPI    Physical Exam:    /65   Pulse 67   Temp 97.9 °F (36.6 °C) (Oral)   Resp 16   Ht 5' 2" (1.575 m)   Wt 54.9 kg (120 lb 15.2 oz)   LMP  (Within Years) Comment: patient states last menstrual period was two years ago  SpO2 (!) 93%   Breastfeeding No   BMI 22.12 kg/m²     General Appearance:    Alert, cooperative, no distress, appears stated age   Head:    Normocephalic, without obvious abnormality, atraumatic   Eyes:    PER, conjunctiva/corneas clear, EOM's intact in both eyes        Throat:   Lips, mucosa, and tongue normal; teeth and gums normal   Back:     Symmetric, no curvature, ROM normal, no CVA tenderness   Lungs:     Clear to auscultation bilaterally, respirations unlabored   Chest wall:    No tenderness or deformity   Heart:    Regular rate and rhythm, S1 and S2 normal, no murmur, rub   or gallop   Abdomen:     Soft, non-tender, bowel sounds active all four quadrants,     no masses, no organomegaly   Extremities:   Extremities normal, atraumatic, no cyanosis or edema   Pulses:   2+ and symmetric all extremities   MSK:   No joint or muscle swelling, tenderness or deformity   Skin:   Skin color, texture, turgor normal, no rashes or lesions   Neurologic:   CNII-XII intact, normal strength and sensation       Throughout.  No flap     Results:  Lab Results   Component Value Date     08/03/2023    K 3.9 08/03/2023    CL 99 08/03/2023    CO2 26 08/03/2023    BUN 40 " (H) 08/03/2023    CREATININE 6.9 (H) 08/03/2023    CALCIUM 8.5 (L) 08/03/2023    ANIONGAP 11 08/03/2023    ESTGFRAFRICA 20 (A) 07/31/2022    EGFRNONAA 18 (A) 07/31/2022       Lab Results   Component Value Date    CALCIUM 8.5 (L) 08/03/2023    PHOS 5.9 (H) 08/01/2023       Recent Labs   Lab 08/03/23  0511   WBC 8.72   RBC 2.63*   HGB 8.0*   HCT 23.5*   *   MCV 89   MCH 30.4   MCHC 34.0            I have personally reviewed pertinent radiological imaging and reports.

## 2023-08-03 NOTE — DISCHARGE SUMMARY
[facility] Medicine  Discharge Summary      Patient Name: Tabby Howard  MRN: [id number]  UNIQUE: [id number]  Patient Class: IP- Inpatient  Admission Date: 8/1/2023  Hospital Length of Stay: 2 days  Discharge Date and Time:  08/03/2023 9:50 AM  Attending Physician: Roby Neves MD   Discharging Provider: Roby Neves MD  Primary Care Provider: Melony Kim NP    Primary Care Team: Networked reference to record PCT     HPI:   33 year old with prior history of ESRD on hemodialysis(T-Th-S),  gastroparesis, seizure, prior C diff infection, DMII, poor vision, sickle cell trait presented to an outside ER with abdominal pain, N/V.  She was found to be anemic with Hg 6.7.  She was transfused blood. CT abd and pelvis with IV contrast obtained and no acute process. She was transferred to [facility] for Nephrology and HD needs as she was due HD today.  Hg has improved to 8.2  after tranfusion.  Initial labs in the ED also revealing of AG 24, Glucose 210, CO2 18.  This has improved to CO2 23 and AG is improving. She may have been in early DKA and this improved with blood transfusion.  Initial LA is 2.7.  Repeat is 1.3.  Seen by Nephrology and HD will be performed today. Still having abdominal pain that is diffuse.  Not currently vomiting. Also reports back pain and pruritus to her back.  There is an area of abrasion where she rubbed her back against a wall in effort to make the itching stop.  She's had issues with anemia requiring periodic transfusions.  Per chart review, 4/2023 Fe 51, Ferritin 5959.  3/2023 Retic count was appropriately elevated. EGD last done 12/2022 and revealed gastritis with no active bleeding.  Colonoscopy last 8/2022 and was poor prep but no acute process seen and no bleeding.       * No surgery found *      Hospital Course:    33 year old with prior history of ESRD on hemodialysis(T-Th-S),  gastroparesis, seizure, prior C diff infection, DMII, poor vision, sickle cell  "trait presented to an outside ER with abdominal pain, N/V.  She was found to be anemic with Hg 6.7.  She was transfused blood. CT abd and pelvis with IV contrast obtained and no acute process. She was transferred to [facility] for Nephrology and HD needs as she was due HD.  Hg has improved to 8.2  after tranfusion.  Initial labs in the ED also revealing of AG 24, Glucose 210, CO2 18.  This has improved to CO2 23 and AG is improving. She may have been in early DKA and this improved with blood transfusion.  Initial LA is 2.7.  Repeat is 1.3.  Seen by Nephrology and HD will be performed per routine schedule. Still having abdominal pain that is diffuse.  Now vomiting. Also reports back pain and pruritus to her back.  There is an area of abrasion where she rubbed her back against a wall in effort to make the itching stop.  She's had issues with anemia requiring periodic transfusions.  Per chart review, 4/2023 Fe 51, Ferritin 5959.  3/2023 Retic count was appropriately elevated. EGD last done 12/2022 and revealed gastritis with no active bleeding.  Colonoscopy last 8/2022 and was poor prep but no acute process seen and no bleeding.     08/03  Assumed care. Patient feels "Much better". Ate breakfast without issue. Would like to be discharge home with outpatient follow-up after RRT today.  VSS  Lungs: no adventitious sounds  Heart S1S2 reg  Abdo soft  Imp ESRD, Abdominal Pain (Resolved)  Plan: Discharge home with outpatient follow-up after RRT today; continue preadmission regimen as listed below in discharge med rec; Renal diet       Goals of Care Treatment Preferences:  Code Status: Full Code    Health care agent: Step-Mother Tanya Howard / Father Garcia Howard  Health care agent number: [phone] / [phone]                   Consults:   Consults (From admission, onward)        Status Ordering Provider     Inpatient consult to Nephrology  Once        Provider:  Joe Dick MD    Completed JANINE FLORES    "       Neuro  Seizure disorder  Discharge home with outpatient follow-up after RRT today; continue preadmission regimen as listed below in discharge med rec; Renal diet      Cardiac/Vascular  Hypertension  Discharge home with outpatient follow-up after RRT today; continue preadmission regimen as listed below in discharge med rec; Renal diet      Renal/  ESRD (end stage renal disease)  Discharge home with outpatient follow-up after RRT today; continue preadmission regimen as listed below in discharge med rec; Renal diet      Oncology  Anemia due to chronic kidney disease, on chronic dialysis  Discharge home with outpatient follow-up after RRT today; continue preadmission regimen as listed below in discharge med rec; Renal diet      Endocrine  Diabetes mellitus due to underlying condition with unspecified complications  Discharge home with outpatient follow-up after RRT today; continue preadmission regimen as listed below in discharge med rec; Renal dietHold Oral hypoglycemics while patient is in the hospital.    Other  Thrombocytopenia  Discharge home with outpatient follow-up after RRT today; continue preadmission regimen as listed below in discharge med rec; Renal diet        Final Active Diagnoses:    Diagnosis Date Noted POA    Hypertension [I10] 06/17/2023 Yes    Anemia due to chronic kidney disease, on chronic dialysis [N18.6, D63.1, Z99.2] 10/26/2022 Not Applicable    Thrombocytopenia [D69.6] 10/26/2022 Yes     Chronic    ESRD (end stage renal disease) [N18.6] 07/22/2022 Yes    Seizure disorder [G40.909] 05/29/2022 Yes     Chronic    Diabetes mellitus due to underlying condition with unspecified complications [E08.8] 04/12/2022 Yes      Problems Resolved During this Admission:    Diagnosis Date Noted Date Resolved POA    PRINCIPAL PROBLEM:  Abdominal pain [R10.9] 01/12/2023 08/03/2023 Yes       Discharged Condition: good    Disposition: Home or Self Care    Follow Up:   Follow-up Information      "Kim, Melony C, NP. Go on 8/11/2023.    Specialty: Nurse Practitioner  Why: follow up as previously scheduled at 10AM  Contact information:  [address] [id number] [phone]                       Patient Instructions:      Diet renal     Diet renal     Activity as tolerated       Significant Diagnostic Studies: Labs:   BMP:   Recent Labs   Lab 08/02/23  0437 08/03/23  0224 08/03/23  0511   GLU 85 66* 74     --  136   K 3.6  --  3.9     --  99   CO2 28  --  26   BUN 24*  --  40*   CREATININE 4.7*  --  6.9*   CALCIUM 8.8  --  8.5*   MG 1.9  --  2.3    and CBC   Recent Labs   Lab 08/02/23 0437 08/03/23  0511   WBC 8.27 8.72   HGB 7.6* 8.0*   HCT 22.9* 23.5*   PLT 93* 110*       Pending Diagnostic Studies:     None         Medications:  Reconciled Home Medications:      Medication List      CHANGE how you take these medications    gabapentin 300 MG capsule  Commonly known as: NEURONTIN  Take 2 capsules twice a day by oral route for 90 days.  What changed: Another medication with the same name was removed. Continue taking this medication, and follow the directions you see here.        CONTINUE taking these medications    * amLODIPine 5 MG tablet  Commonly known as: NORVASC  Take 1 tablet (5 mg total) by mouth once daily.     * amLODIPine 5 MG tablet  Commonly known as: NORVASC  Take 1 tablet every day by oral route for 90 days.     BD ULTRA-FINE MINI PEN NEEDLE 31 gauge x 3/16" Ndle  Generic drug: pen needle, diabetic  Use as directed to inject insulin 5 times daily     carvediloL 25 MG tablet  Commonly known as: COREG  Take 1 tablet (25 mg total) by mouth 2 (two) times daily.     * ELIQUIS 5 mg Tab  Generic drug: apixaban  Take 1 tablet (5 mg total) by mouth 2 (two) times daily.     * ELIQUIS 5 mg Tab  Generic drug: apixaban  Take 1 tablet (5 mg total) by mouth 2 (two) times daily.     * ELIQUIS 5 mg Tab  Generic drug: apixaban  Take 1 tablet twice a day by oral route for 90 days.     * " FLUoxetine 20 MG capsule  Take 1 capsule (20 mg total) by mouth once daily.     * FLUoxetine 20 MG capsule  Take 1 capsule every day by oral route for 90 days.     * hydrALAZINE 100 MG tablet  Commonly known as: APRESOLINE  Take 1 tablet (100 mg total) by mouth every 8 (eight) hours.     * hydrALAZINE 100 MG tablet  Commonly known as: APRESOLINE  Take 1 tablet (100 mg total) by mouth 2 (two) times a day.     * hydrALAZINE 100 MG tablet  Commonly known as: APRESOLINE  Take 1 tablet twice a day by oral route for 90 days.     HYDROcodone-acetaminophen 7.5-325 mg per tablet  Commonly known as: NORCO  Take 1 tablet by mouth every 6 (six) hours as needed for Pain.     * insulin detemir U-100 (Levemir) 100 unit/mL (3 mL) Inpn pen  Inject 18 Units into the skin once daily.     * LEVEMIR FLEXPEN 100 unit/mL (3 mL) Inpn pen  Generic drug: insulin detemir U-100 (Levemir)  Inject 18 Units into the skin every evening.     * LEVEMIR FLEXPEN 100 unit/mL (3 mL) Inpn pen  Generic drug: insulin detemir U-100 (Levemir)  Inject 18 units into the skin every evening     * isosorbide mononitrate 30 MG 24 hr tablet  Commonly known as: IMDUR  Take 3 tablets (90 mg total) by mouth once daily.     * isosorbide mononitrate 30 MG 24 hr tablet  Commonly known as: IMDUR  Take 1 tablet (30 mg total) by mouth once daily.     * isosorbide mononitrate 30 MG 24 hr tablet  Commonly known as: IMDUR  Take 1 tablet every day by oral route for 90 days.     lancets 33 gauge Misc  Use to test twice daily     levETIRAcetam 500 MG Tab  Commonly known as: KEPPRA  Take 1 tablet twice a day by oral route for 30 days.     * NovoLOG FlexPen U-100 Insulin 100 unit/mL (3 mL) Inpn pen  Generic drug: insulin aspart U-100  Inject 4 Units into the skin 3 (three) times daily with meals.     * NovoLOG FlexPen U-100 Insulin 100 unit/mL (3 mL) Inpn pen  Generic drug: insulin aspart U-100  Inject 5 Units into the skin 3 (three) times daily before meals.     * NovoLOG FlexPen  U-100 Insulin 100 unit/mL (3 mL) Inpn pen  Generic drug: insulin aspart U-100  Inject 8 units 3 times a day by subcutaneous route before meals for 90 days.     ondansetron 4 MG tablet  Commonly known as: ZOFRAN  Take 1 tablet (4 mg total) by mouth every 6 (six) hours as needed for Nausea.     ondansetron 4 MG Tbdl  Commonly known as: ZOFRAN-ODT  Place 1 tablet (4 mg) on or under the tongue every 8 hours by translingual route for 10 days.     * pantoprazole 40 MG tablet  Commonly known as: PROTONIX  Take 1 tablet every day by oral route for 30 days.     * pantoprazole 40 MG tablet  Commonly known as: PROTONIX  Take 1 tablet every day by oral route for 30 days.     * pantoprazole 40 MG tablet  Commonly known as: PROTONIX  Take 1 tablet every day by oral route for 90 days.     * promethazine 25 MG suppository  Commonly known as: PHENERGAN  Place 1 suppository (25 mg total) rectally every 6 (six) hours as needed for Nausea.     * promethazine 25 MG suppository  Commonly known as: PHENERGAN  Place 1 suppository (25 mg total) rectally every 6 (six) hours as needed for Nausea.     * sucralfate 100 mg/mL suspension  Commonly known as: CARAFATE  Take 10 mLs (1,000 mg total) by mouth 4 (four) times daily.     * sucralfate 100 mg/mL suspension  Commonly known as: CARAFATE  Take 10 mL 4 times a day by oral route before meals for 30 days.         * This list has 26 medication(s) that are the same as other medications prescribed for you. Read the directions carefully, and ask your doctor or other care provider to review them with you.                Indwelling Lines/Drains at time of discharge:   Lines/Drains/Airways     Central Venous Catheter Line  Duration                Hemodialysis Catheter 06/17/23 1135 right internal jugular 46 days                Time spent on the discharge of patient: 32  minutes         Roby Neves MD  Department of Hospital Medicine  [facility]

## 2023-08-03 NOTE — PLAN OF CARE
[facility]  Discharge Final Note    Primary Care Provider: Melony Kim NP    Expected Discharge Date: 8/3/2023    The pt is cleared for discharge home from case management and has follow up appointments added to her AVS.     Final Discharge Note (most recent)       Final Note - 08/03/23 1001          Final Note    Assessment Type Final Discharge Note     Anticipated Discharge Disposition Home or Self Care     What phone number can be called within the next 1-3 days to see how you are doing after discharge? [phone]     Hospital Resources/Appts/Education Provided Appointments scheduled and added to AVS        Post-Acute Status    Discharge Delays None known at this time                     Important Message from Medicare             Contact Info       Melony Kim NP   Specialty: Nurse Practitioner   Relationship: PCP - General    [address] [address]   Phone: [phone]       Next Steps: Go on 8/11/2023    Instructions: follow up as previously scheduled at 10AM

## 2023-08-03 NOTE — CONSULTS
INPATIENT NEPHROLOGY CONSULT   [facility] NEPHROLOGY    Tabby Howard  08/03/2023    Reason for consultation:  esrd    Chief Complaint: No chief complaint on file.         History of Present Illness:     Patient comes our facility with complaints of lower abdominal pain affecting her suprapubic area as well as both lower quadrants.  Patient claims that the pain is sharp in nature.  It started yesterday and has been continuous.  Patient has had a history of chronic abdominal pain with known history of gastroparesis.  Patient states this pain is different as typically her pain is crampy in nature and this is sharp.  Additionally, her usual abdominal pain is epigastric and this was lower abdominal.  Triage listed flank pain as a chief complaint but patient describes bilateral paraspinal lumbar pain rather than flank pain.  Patient is a dialysis patient and received this on Tuesday, Thursday, Saturday.  Last dialysis 2 days prior.  Patient's nephrologist is Mohsen Sommer.  PCP is out of [place].  Patient denies any associated fevers or chills.  She has had some nausea with emesis mildly improved Zofran.  Patient denies any diarrhea.  She had a normal bowel today.  Patient denies any rectal pain.  Patient denies any gross blood per rectum.  Patient does not make any urine due to her end-stage renal disease.  Patient denies any chest pains, dyspnea, consumption of unusual or suspect foods.    8/1  consulted for dialysis management.  Bp better this am. C/o abd pain.  Always c/o abd pain for last two years.     8/2  AFVSS.  Abdominal pain persists.      Plan of Care:       Assessment:    esrd  --continue dialysis per routine  --fluid restrict  --renal dose medication per routine  --continue outpt medication  --continue binders with meals    Anemia  --erythropoiesis stimulating agent with renal replacement therapy    Hyperphosphatemia  --renal diet  --continue binders    Hypertension  --uf with hd  --fluid  restrict  --low salt diet  --continue home medication         Thank you for allowing us to participate in this patient's care. We will continue to follow.    Vital Signs:  Temp Readings from Last 3 Encounters:   23 97.9 °F (36.6 °C) (Oral)   23 97.7 °F (36.5 °C)   07/10/23 97.9 °F (36.6 °C) (Temporal)       Pulse Readings from Last 3 Encounters:   23 67   23 80   23 81       BP Readings from Last 3 Encounters:   23 109/65   23 (!) 152/97   23 (!) 198/120       Weight:  Wt Readings from Last 3 Encounters:   23 54.9 kg (120 lb 15.2 oz)   23 50.8 kg (112 lb)   07/10/23 57.2 kg (126 lb)       Past Medical & Surgical History:  Past Medical History:   Diagnosis Date    ESRD on hemodialysis 2022    Gastritis 2022    EGD was 22    Gastroparesis 2022    has not had Emptying study    Heart failure with preserved ejection fraction 2022    EF 55% on 3/22    History of supraventricular tachycardia     Hyperkalemia 2022    Hypertensive emergency 2022    Sickle cell trait 2022    Type 2 diabetes mellitus        Past Surgical History:   Procedure Laterality Date     SECTION      x 3    COLONOSCOPY      COLONOSCOPY N/A 2022    Procedure: COLONOSCOPY;  Surgeon: Jagdeep Cedeno MD;  Location: [facility];  Service: Endoscopy;  Laterality: N/A;    ESOPHAGOGASTRODUODENOSCOPY N/A 10/18/2019    Procedure: ESOPHAGOGASTRODUODENOSCOPY (EGD);  Surgeon: Gianluca Mendez MD;  Location: [facility];  Service: Endoscopy;  Laterality: N/A;    ESOPHAGOGASTRODUODENOSCOPY N/A 2022    Procedure: EGD (ESOPHAGOGASTRODUODENOSCOPY);  Surgeon: Micky Paredes III, MD;  Location: [facility];  Service: Endoscopy;  Laterality: N/A;    ESOPHAGOGASTRODUODENOSCOPY N/A 2022    Procedure: EGD (ESOPHAGOGASTRODUODENOSCOPY);  Surgeon: Marcelo Zhong MD;  Location: [facility];  Service: Endoscopy;  Laterality: N/A;    INSERTION, CATHETER,  TUNNELED N/A 6/17/2023    Procedure: Insertion,catheter,tunneled;  Surgeon: Carlos Thurman Jr., MD;  Location: [facility] OR;  Service: General;  Laterality: N/A;    LAPAROSCOPIC CHOLECYSTECTOMY N/A 07/30/2022    Procedure: CHOLECYSTECTOMY, LAPAROSCOPIC;  Surgeon: Grey Perez MD;  Location: [facility] OR;  Service: General;  Laterality: N/A;    PLACEMENT OF DUAL-LUMEN VASCULAR CATHETER Left 07/12/2022    Procedure: INSERTION-CATHETER-JOSEPH;  Surgeon: Dionte Gan MD;  Location: [facility] OR;  Service: General;  Laterality: Left;    PLACEMENT OF DUAL-LUMEN VASCULAR CATHETER Right 07/26/2022    Procedure: INSERTION-CATHETER-Hemosplit;  Surgeon: Dionte Gan MD;  Location: [facility] OR;  Service: General;  Laterality: Right;       Past Social History:  Social History     Socioeconomic History    Marital status:    Tobacco Use    Smoking status: Never    Smokeless tobacco: Never   Substance and Sexual Activity    Alcohol use: Not Currently    Drug use: No    Sexual activity: Not Currently     Partners: Male     Birth control/protection: I.U.D.     Social Determinants of Health     Financial Resource Strain: Low Risk  (5/16/2023)    Overall Financial Resource Strain (CARDIA)     Difficulty of Paying Living Expenses: Not very hard   Food Insecurity: No Food Insecurity (5/16/2023)    Hunger Vital Sign     Worried About Running Out of Food in the Last Year: Never true     Ran Out of Food in the Last Year: Never true   Transportation Needs: No Transportation Needs (5/16/2023)    PRAPARE - Transportation     Lack of Transportation (Medical): No     Lack of Transportation (Non-Medical): No   Physical Activity: Inactive (5/16/2023)    Exercise Vital Sign     Days of Exercise per Week: 0 days     Minutes of Exercise per Session: 0 min   Stress: No Stress Concern Present (5/16/2023)    [employer] - Occupational Stress Questionnaire     Feeling of Stress : Not at all   Social Connections: Unknown  (5/16/2023)    Social Connection and Isolation Panel [NHANES]     Frequency of Communication with Friends and Family: More than three times a week     Frequency of Social Gatherings with Friends and Family: More than three times a week     Attends Druze Services: Never     Active Member of Clubs or Organizations: No     Attends Club or Organization Meetings: Never     Marital Status: Patient refused   Housing Stability: Low Risk  (5/16/2023)    Housing Stability Vital Sign     Unable to Pay for Housing in the Last Year: No     Number of Places Lived in the Last Year: 1     Unstable Housing in the Last Year: No       Medications:  No current facility-administered medications on file prior to encounter.     Current Outpatient Medications on File Prior to Encounter   Medication Sig Dispense Refill    amLODIPine (NORVASC) 5 MG tablet Take 1 tablet every day by oral route for 90 days. 90 tablet 1    apixaban (ELIQUIS) 5 mg Tab Take 1 tablet (5 mg total) by mouth 2 (two) times daily. 60 tablet 2    carvediloL (COREG) 25 MG tablet Take 1 tablet (25 mg total) by mouth 2 (two) times daily. 60 tablet 5    FLUoxetine 20 MG capsule Take 1 capsule (20 mg total) by mouth once daily. 30 capsule 5    hydrALAZINE (APRESOLINE) 100 MG tablet Take 1 tablet (100 mg total) by mouth 2 (two) times a day. 60 tablet 2    HYDROcodone-acetaminophen (NORCO) 7.5-325 mg per tablet Take 1 tablet by mouth every 6 (six) hours as needed for Pain. 30 tablet 0    insulin aspart U-100 (NOVOLOG) 100 unit/mL (3 mL) InPn pen Inject 4 Units into the skin 3 (three) times daily with meals. 15 mL 3    insulin detemir U-100, Levemir, (LEVEMIR FLEXPEN) 100 unit/mL (3 mL) InPn pen Inject 18 Units into the skin every evening. 9 mL 5    isosorbide mononitrate (IMDUR) 30 MG 24 hr tablet Take 1 tablet (30 mg total) by mouth once daily. 30 tablet 1    levETIRAcetam (KEPPRA) 500 MG Tab Take 1 tablet twice a day by oral route for 30 days. 60 tablet 2    ondansetron  (ZOFRAN) 4 MG tablet Take 1 tablet (4 mg total) by mouth every 6 (six) hours as needed for Nausea. 12 tablet 0    pantoprazole (PROTONIX) 40 MG tablet Take 1 tablet every day by oral route for 90 days. 90 tablet 1    promethazine (PHENERGAN) 25 MG suppository Place 1 suppository (25 mg total) rectally every 6 (six) hours as needed for Nausea. 10 suppository 0    sucralfate (CARAFATE) 100 mg/mL suspension Take 10 mLs (1,000 mg total) by mouth 4 (four) times daily. 1200 mL 1    [DISCONTINUED] gabapentin (NEURONTIN) 300 MG capsule Take 2 capsules (600 mg total) by mouth 2 (two) times a day. 120 capsule 1    amLODIPine (NORVASC) 5 MG tablet Take 1 tablet (5 mg total) by mouth once daily. 30 tablet 1    apixaban (ELIQUIS) 5 mg Tab Take 1 tablet (5 mg total) by mouth 2 (two) times daily. 60 tablet 2    apixaban (ELIQUIS) 5 mg Tab Take 1 tablet twice a day by oral route for 90 days. 180 tablet 1    FLUoxetine 20 MG capsule Take 1 capsule every day by oral route for 90 days. 90 capsule 1    gabapentin (NEURONTIN) 300 MG capsule Take 2 capsules twice a day by oral route for 90 days. 360 capsule 1    hydrALAZINE (APRESOLINE) 100 MG tablet Take 1 tablet (100 mg total) by mouth every 8 (eight) hours. 60 tablet 0    hydrALAZINE (APRESOLINE) 100 MG tablet Take 1 tablet twice a day by oral route for 90 days. 180 tablet 1    insulin aspart U-100 (NOVOLOG FLEXPEN U-100 INSULIN) 100 unit/mL (3 mL) InPn pen Inject 5 Units into the skin 3 (three) times daily before meals. 15 mL 2    insulin aspart U-100 (NOVOLOG FLEXPEN U-100 INSULIN) 100 unit/mL (3 mL) InPn pen Inject 8 units 3 times a day by subcutaneous route before meals for 90 days. 15 mL 1    insulin detemir U-100, Levemir, (LEVEMIR FLEXPEN) 100 unit/mL (3 mL) InPn pen Inject 18 units into the skin every evening 15 mL 1    insulin detemir U-100, Levemir, 100 unit/mL (3 mL) SubQ InPn pen Inject 18 Units into the skin once daily. 15 mL 3    isosorbide mononitrate (IMDUR) 30 MG 24  "hr tablet Take 3 tablets (90 mg total) by mouth once daily. 90 tablet 11    isosorbide mononitrate (IMDUR) 30 MG 24 hr tablet Take 1 tablet every day by oral route for 90 days. 90 tablet 1    lancets 33 gauge Misc Use to test twice daily 100 each 5    ondansetron (ZOFRAN-ODT) 4 MG TbDL Place 1 tablet (4 mg) on or under the tongue every 8 hours by translingual route for 10 days. 24 tablet 2    pantoprazole (PROTONIX) 40 MG tablet Take 1 tablet every day by oral route for 30 days. 30 tablet 2    pantoprazole (PROTONIX) 40 MG tablet Take 1 tablet every day by oral route for 30 days. 30 tablet 2    pen needle, diabetic 31 gauge x 3/16" Ndle Use as directed to inject insulin 5 times daily 100 each 11    promethazine (PHENERGAN) 25 MG suppository Place 1 suppository (25 mg total) rectally every 6 (six) hours as needed for Nausea. 10 suppository 0    sucralfate (CARAFATE) 100 mg/mL suspension Take 10 mL 4 times a day by oral route before meals for 30 days. 1260 mL 1    [DISCONTINUED] atenoloL (TENORMIN) 50 MG tablet Take 1 tablet (50 mg total) by mouth every other day. 45 tablet 3    [DISCONTINUED] gabapentin (NEURONTIN) 100 MG capsule Take 1 capsule by mouth 3 times daily 90 capsule 2    [DISCONTINUED] omeprazole (PRILOSEC) 20 MG capsule Take 2 capsules (40 mg total) by mouth once daily. for 10 days 20 capsule 0     Scheduled Meds:   sodium chloride 0.9%   Intravenous Once    amLODIPine  5 mg Oral Daily    apixaban  5 mg Oral BID    carvediloL  25 mg Oral BID    epoetin teresa-epbx  100 Units/kg Subcutaneous Every Tues, Thurs, Sat    FLUoxetine  20 mg Oral Daily    gabapentin  600 mg Oral BID    hydrALAZINE  100 mg Oral BID    insulin detemir U-100 (Levemir)  18 Units Subcutaneous QHS    isosorbide mononitrate  30 mg Oral Daily    levETIRAcetam  500 mg Oral BID    metoclopramide HCl  10 mg Intravenous TID    mupirocin   Nasal BID    pantoprazole  40 mg Oral Before breakfast     Continuous Infusions:  PRN Meds:.sodium " "chloride 0.9%, acetaminophen, dextrose 50%, dextrose 50%, dicyclomine, diphenhydrAMINE, glucagon (human recombinant), glucose, glucose, HYDROcodone-acetaminophen, insulin aspart U-100, melatonin, naloxone, ondansetron, polyethylene glycol, sodium chloride 0.9%, sodium chloride 0.9%    Allergies:  Penicillins    Past Family History:  Reviewed; refer to Hospitalist Admission Note    Review of Systems:  Review of Systems - All 14 systems reviewed and negative, except as noted in HPI    Physical Exam:    /65   Pulse 67   Temp 97.9 °F (36.6 °C) (Oral)   Resp 16   Ht 5' 2" (1.575 m)   Wt 54.9 kg (120 lb 15.2 oz)   LMP  (Within Years) Comment: patient states last menstrual period was two years ago  SpO2 (!) 93%   Breastfeeding No   BMI 22.12 kg/m²     General Appearance:    Alert, cooperative, no distress, appears stated age   Head:    Normocephalic, without obvious abnormality, atraumatic   Eyes:    PER, conjunctiva/corneas clear, EOM's intact in both eyes        Throat:   Lips, mucosa, and tongue normal; teeth and gums normal   Back:     Symmetric, no curvature, ROM normal, no CVA tenderness   Lungs:     Clear to auscultation bilaterally, respirations unlabored   Chest wall:    No tenderness or deformity   Heart:    Regular rate and rhythm, S1 and S2 normal, no murmur, rub   or gallop   Abdomen:     Soft, non-tender, bowel sounds active all four quadrants,     no masses, no organomegaly   Extremities:   Extremities normal, atraumatic, no cyanosis or edema   Pulses:   2+ and symmetric all extremities   MSK:   No joint or muscle swelling, tenderness or deformity   Skin:   Skin color, texture, turgor normal, no rashes or lesions   Neurologic:   CNII-XII intact, normal strength and sensation       Throughout.  No flap     Results:  Lab Results   Component Value Date     08/03/2023    K 3.9 08/03/2023    CL 99 08/03/2023    CO2 26 08/03/2023    BUN 40 (H) 08/03/2023    CREATININE 6.9 (H) 08/03/2023    " CALCIUM 8.5 (L) 08/03/2023    ANIONGAP 11 08/03/2023    ESTGFRAFRICA 20 (A) 07/31/2022    EGFRNONAA 18 (A) 07/31/2022       Lab Results   Component Value Date    CALCIUM 8.5 (L) 08/03/2023    PHOS 5.9 (H) 08/01/2023       Recent Labs   Lab 08/03/23  0511   WBC 8.72   RBC 2.63*   HGB 8.0*   HCT 23.5*   *   MCV 89   MCH 30.4   MCHC 34.0            I have personally reviewed pertinent radiological imaging and reports.

## 2023-08-03 NOTE — PLAN OF CARE
"2046: POCT glucose checked and resulted as 133.   0125: Pt called to nurses station to report feeling her "sugar feels low" POCT glucose checked and resulted as 37. Pt drank 250 ml orange juice and was given 24 g glucose tablets per PRN order.   0146: POCT glucose rechecked and was 46.   0206: POCT glucose rechecked and was 75  0224: Lab mariann a serum glucose, resulted as 66  0511: Serum glucose 74.  Pt alert, oriented and stated she was "feeling better"    Problem: Adult Inpatient Plan of Care  Goal: Plan of Care Review  Outcome: Ongoing, Progressing  Goal: Patient-Specific Goal (Individualized)  Outcome: Ongoing, Progressing  Goal: Absence of Hospital-Acquired Illness or Injury  Outcome: Ongoing, Progressing  Goal: Optimal Comfort and Wellbeing  Outcome: Ongoing, Progressing  Goal: Readiness for Transition of Care  Outcome: Ongoing, Progressing     Problem: Diabetes Comorbidity  Goal: Blood Glucose Level Within Targeted Range  Outcome: Ongoing, Progressing     Problem: Infection  Goal: Absence of Infection Signs and Symptoms  Outcome: Ongoing, Progressing     Problem: Impaired Wound Healing  Goal: Optimal Wound Healing  Outcome: Ongoing, Progressing     Problem: Device-Related Complication Risk (Hemodialysis)  Goal: Safe, Effective Therapy Delivery  Outcome: Ongoing, Progressing     Problem: Hemodynamic Instability (Hemodialysis)  Goal: Effective Tissue Perfusion  Outcome: Ongoing, Progressing     Problem: Infection (Hemodialysis)  Goal: Absence of Infection Signs and Symptoms  Outcome: Ongoing, Progressing     "

## 2023-08-04 ENCOUNTER — PATIENT OUTREACH (OUTPATIENT)
Dept: ADMINISTRATIVE | Facility: CLINIC | Age: 33
End: 2023-08-04
Payer: MEDICAID

## 2023-08-04 NOTE — PROGRESS NOTES
C3 nurse attempted to contact Tabby Howard for a TCC post hospital discharge follow up call. No answer. Left voicemail with callback information. Per AVS, the patient has a scheduled HOSFU appointment with Melony Kim NP on 08/11/23 @ 1000. PCP is a [facility] provider.

## 2023-08-04 NOTE — PROGRESS NOTES
3000 ml net uf removed   08/03/23 1730   Required for all Hemodialysis Patients   Hepatitis Status negative   Handoff Report   Received From Dahlia Sue   Treatment Type   Treatment Type Maintenance   Vital Signs   Temp 98.2 °F (36.8 °C)   Pulse 80   Resp 18   /87   Assessments (Pre/Post)   Date Hepatitis Profile Obtained 02/07/23   Blood Liters Processed (BLP) 51   Transport Modality ambulatory   Level of Consciousness (AVPU) alert   Dialyzer Clearance mildly streaked   Pain   Preferred Pain Scale number (Numeric Rating Pain Scale)   Pain Rating (0-10): Rest 0        Hemodialysis Catheter 06/17/23 1135 right internal jugular   Placement Date/Time: 06/17/23 1135   Present Prior to Hospital Arrival?: No  Hand Hygiene: Performed  Barrier Precautions: Performed  Skin Antisepsis: ChloraPrep  Hemodialysis Catheter Type: Tunneled catheter  Location: right internal jugular  Cathete...   Site Assessment No drainage;No redness;No swelling;No warmth   Line Securement Device Secured with sutures   Dressing Type CHG impregnated dressing/sponge;Central line dressing   Dressing Status Dry;Intact;Clean   Dressing Intervention Integrity maintained   Date on Dressing 08/01/23   Dressing Due to be Changed 08/06/23   Venous Patency/Care flushed w/o difficulty;normal saline locked   Arterial Patency/Care flushed w/o difficulty;normal saline locked   Post-Hemodialysis Assessment   Rinseback Volume (mL) 250 mL   Blood Volume Processed (Liters) 51 L   Dialyzer Clearance Lightly streaked   Duration of Treatment 180 minutes   Additional Fluid Intake (mL) 500 mL   Total UF (mL) 3500 mL   Net Fluid Removal 3000   Patient Response to Treatment tolerated well   Post-Treatment Weight 51.9 kg (114 lb 6.7 oz)   Treatment Weight Change -3   Post-Hemodialysis Comments tx completed     Educated on cvc

## 2023-08-04 NOTE — PROGRESS NOTES
C3 nurse attempted to contact Tabby Howard for a TCC post hospital discharge follow up call. No answer. Left voicemail with callback information. Per AVS, the patient has a scheduled HOSFU appointment with Melony Kim NP on 08/11/23 @ 1000. PCP [facility] a [facility].

## 2023-08-06 LAB
BACTERIA BLD CULT: NORMAL
BACTERIA BLD CULT: NORMAL

## 2023-08-14 DIAGNOSIS — Z01.818 ENCOUNTER FOR OTHER PREPROCEDURAL EXAMINATION: Primary | ICD-10-CM

## 2023-08-17 ENCOUNTER — HOSPITAL ENCOUNTER (OUTPATIENT)
Facility: HOSPITAL | Age: 33
Discharge: HOME | End: 2023-08-19
Attending: STUDENT IN AN ORGANIZED HEALTH CARE EDUCATION/TRAINING PROGRAM | Admitting: STUDENT IN AN ORGANIZED HEALTH CARE EDUCATION/TRAINING PROGRAM
Payer: MEDICAID

## 2023-08-17 DIAGNOSIS — N18.6 ANEMIA IN ESRD (END-STAGE RENAL DISEASE): ICD-10-CM

## 2023-08-17 DIAGNOSIS — R10.84 GENERALIZED ABDOMINAL PAIN: Primary | ICD-10-CM

## 2023-08-17 DIAGNOSIS — R94.31 QT PROLONGATION: ICD-10-CM

## 2023-08-17 DIAGNOSIS — D63.1 ANEMIA IN ESRD (END-STAGE RENAL DISEASE): ICD-10-CM

## 2023-08-17 DIAGNOSIS — R10.9 ABDOMINAL PAIN: ICD-10-CM

## 2023-08-17 LAB
ABO + RH BLD: NORMAL
ALBUMIN SERPL BCP-MCNC: 3.8 G/DL (ref 3.5–5.2)
ALP SERPL-CCNC: 145 U/L (ref 55–135)
ALT SERPL W/O P-5'-P-CCNC: 21 U/L (ref 10–44)
ANION GAP SERPL CALC-SCNC: 12 MMOL/L (ref 8–16)
AST SERPL-CCNC: 25 U/L (ref 10–40)
BASOPHILS # BLD AUTO: 0.02 K/UL (ref 0–0.2)
BASOPHILS NFR BLD: 0.4 % (ref 0–1.9)
BILIRUB SERPL-MCNC: 1.7 MG/DL (ref 0.1–1)
BLD GP AB SCN CELLS X3 SERPL QL: NORMAL
BUN SERPL-MCNC: 48 MG/DL (ref 6–20)
CALCIUM SERPL-MCNC: 8.9 MG/DL (ref 8.7–10.5)
CHLORIDE SERPL-SCNC: 95 MMOL/L (ref 95–110)
CO2 SERPL-SCNC: 27 MMOL/L (ref 23–29)
CREAT SERPL-MCNC: 5.5 MG/DL (ref 0.5–1.4)
DIFFERENTIAL METHOD: ABNORMAL
EOSINOPHIL # BLD AUTO: 0.1 K/UL (ref 0–0.5)
EOSINOPHIL NFR BLD: 1.7 % (ref 0–8)
ERYTHROCYTE [DISTWIDTH] IN BLOOD BY AUTOMATED COUNT: 16.7 % (ref 11.5–14.5)
EST. GFR  (NO RACE VARIABLE): 9.8 ML/MIN/1.73 M^2
GLUCOSE SERPL-MCNC: 325 MG/DL (ref 70–110)
GLUCOSE SERPL-MCNC: 504 MG/DL (ref 70–110)
HCG INTACT+B SERPL-ACNC: 1.8 MIU/ML
HCT VFR BLD AUTO: 24.8 % (ref 37–48.5)
HGB BLD-MCNC: 8.7 G/DL (ref 12–16)
IMM GRANULOCYTES # BLD AUTO: 0.02 K/UL (ref 0–0.04)
IMM GRANULOCYTES NFR BLD AUTO: 0.4 % (ref 0–0.5)
LIPASE SERPL-CCNC: 33 U/L (ref 4–60)
LYMPHOCYTES # BLD AUTO: 0.7 K/UL (ref 1–4.8)
LYMPHOCYTES NFR BLD: 13.7 % (ref 18–48)
MAGNESIUM SERPL-MCNC: 2.1 MG/DL (ref 1.6–2.6)
MCH RBC QN AUTO: 30.2 PG (ref 27–31)
MCHC RBC AUTO-ENTMCNC: 35.1 G/DL (ref 32–36)
MCV RBC AUTO: 86 FL (ref 82–98)
MONOCYTES # BLD AUTO: 0.3 K/UL (ref 0.3–1)
MONOCYTES NFR BLD: 6.3 % (ref 4–15)
NEUTROPHILS # BLD AUTO: 3.7 K/UL (ref 1.8–7.7)
NEUTROPHILS NFR BLD: 77.5 % (ref 38–73)
NRBC BLD-RTO: 0 /100 WBC
PHOSPHATE SERPL-MCNC: 4.6 MG/DL (ref 2.7–4.5)
PLATELET # BLD AUTO: 63 K/UL (ref 150–450)
PMV BLD AUTO: 10.5 FL (ref 9.2–12.9)
POTASSIUM SERPL-SCNC: 4.3 MMOL/L (ref 3.5–5.1)
PROT SERPL-MCNC: 7.4 G/DL (ref 6–8.4)
RBC # BLD AUTO: 2.88 M/UL (ref 4–5.4)
SODIUM SERPL-SCNC: 134 MMOL/L (ref 136–145)
SPECIMEN OUTDATE: NORMAL
WBC # BLD AUTO: 4.76 K/UL (ref 3.9–12.7)

## 2023-08-17 PROCEDURE — G0378 HOSPITAL OBSERVATION PER HR: HCPCS

## 2023-08-17 PROCEDURE — 84702 CHORIONIC GONADOTROPIN TEST: CPT | Performed by: STUDENT IN AN ORGANIZED HEALTH CARE EDUCATION/TRAINING PROGRAM

## 2023-08-17 PROCEDURE — 96376 TX/PRO/DX INJ SAME DRUG ADON: CPT | Mod: 59

## 2023-08-17 PROCEDURE — 93005 ELECTROCARDIOGRAM TRACING: CPT | Performed by: INTERNAL MEDICINE

## 2023-08-17 PROCEDURE — 63600175 PHARM REV CODE 636 W HCPCS: Performed by: STUDENT IN AN ORGANIZED HEALTH CARE EDUCATION/TRAINING PROGRAM

## 2023-08-17 PROCEDURE — 83735 ASSAY OF MAGNESIUM: CPT | Performed by: STUDENT IN AN ORGANIZED HEALTH CARE EDUCATION/TRAINING PROGRAM

## 2023-08-17 PROCEDURE — 86900 BLOOD TYPING SEROLOGIC ABO: CPT | Performed by: STUDENT IN AN ORGANIZED HEALTH CARE EDUCATION/TRAINING PROGRAM

## 2023-08-17 PROCEDURE — 96372 THER/PROPH/DIAG INJ SC/IM: CPT | Mod: 59 | Performed by: STUDENT IN AN ORGANIZED HEALTH CARE EDUCATION/TRAINING PROGRAM

## 2023-08-17 PROCEDURE — 85025 COMPLETE CBC W/AUTO DIFF WBC: CPT

## 2023-08-17 PROCEDURE — 99285 EMERGENCY DEPT VISIT HI MDM: CPT | Mod: 25

## 2023-08-17 PROCEDURE — 93010 EKG 12-LEAD: ICD-10-PCS | Mod: ,,, | Performed by: SPECIALIST

## 2023-08-17 PROCEDURE — 25000003 PHARM REV CODE 250: Performed by: STUDENT IN AN ORGANIZED HEALTH CARE EDUCATION/TRAINING PROGRAM

## 2023-08-17 PROCEDURE — 93010 EKG 12-LEAD: ICD-10-PCS | Mod: ,,, | Performed by: INTERNAL MEDICINE

## 2023-08-17 PROCEDURE — 96375 TX/PRO/DX INJ NEW DRUG ADDON: CPT | Mod: 59

## 2023-08-17 PROCEDURE — 83690 ASSAY OF LIPASE: CPT

## 2023-08-17 PROCEDURE — 80053 COMPREHEN METABOLIC PANEL: CPT

## 2023-08-17 PROCEDURE — 93010 ELECTROCARDIOGRAM REPORT: CPT | Mod: ,,, | Performed by: SPECIALIST

## 2023-08-17 PROCEDURE — 93010 ELECTROCARDIOGRAM REPORT: CPT | Mod: ,,, | Performed by: INTERNAL MEDICINE

## 2023-08-17 PROCEDURE — 93005 ELECTROCARDIOGRAM TRACING: CPT | Performed by: SPECIALIST

## 2023-08-17 PROCEDURE — 84100 ASSAY OF PHOSPHORUS: CPT | Performed by: STUDENT IN AN ORGANIZED HEALTH CARE EDUCATION/TRAINING PROGRAM

## 2023-08-17 PROCEDURE — 25500020 PHARM REV CODE 255: Performed by: STUDENT IN AN ORGANIZED HEALTH CARE EDUCATION/TRAINING PROGRAM

## 2023-08-17 PROCEDURE — 36415 COLL VENOUS BLD VENIPUNCTURE: CPT | Performed by: STUDENT IN AN ORGANIZED HEALTH CARE EDUCATION/TRAINING PROGRAM

## 2023-08-17 RX ORDER — TALC
6 POWDER (GRAM) TOPICAL NIGHTLY PRN
Status: DISCONTINUED | OUTPATIENT
Start: 2023-08-17 | End: 2023-08-19 | Stop reason: HOSPADM

## 2023-08-17 RX ORDER — SODIUM CHLORIDE 0.9 % (FLUSH) 0.9 %
10 SYRINGE (ML) INJECTION EVERY 12 HOURS
Status: DISCONTINUED | OUTPATIENT
Start: 2023-08-17 | End: 2023-08-19 | Stop reason: HOSPADM

## 2023-08-17 RX ORDER — PANTOPRAZOLE SODIUM 40 MG/1
40 TABLET, DELAYED RELEASE ORAL DAILY
Status: DISCONTINUED | OUTPATIENT
Start: 2023-08-18 | End: 2023-08-19 | Stop reason: HOSPADM

## 2023-08-17 RX ORDER — OXYCODONE AND ACETAMINOPHEN 5; 325 MG/1; MG/1
1 TABLET ORAL EVERY 4 HOURS PRN
Status: DISCONTINUED | OUTPATIENT
Start: 2023-08-17 | End: 2023-08-19 | Stop reason: HOSPADM

## 2023-08-17 RX ORDER — CARVEDILOL 12.5 MG/1
25 TABLET ORAL 2 TIMES DAILY
Status: DISCONTINUED | OUTPATIENT
Start: 2023-08-17 | End: 2023-08-19 | Stop reason: HOSPADM

## 2023-08-17 RX ORDER — HYDROMORPHONE HYDROCHLORIDE 1 MG/ML
1 INJECTION, SOLUTION INTRAMUSCULAR; INTRAVENOUS; SUBCUTANEOUS ONCE
Status: COMPLETED | OUTPATIENT
Start: 2023-08-17 | End: 2023-08-17

## 2023-08-17 RX ORDER — HYDROMORPHONE HYDROCHLORIDE 1 MG/ML
1 INJECTION, SOLUTION INTRAMUSCULAR; INTRAVENOUS; SUBCUTANEOUS EVERY 4 HOURS PRN
Status: DISCONTINUED | OUTPATIENT
Start: 2023-08-17 | End: 2023-08-19 | Stop reason: HOSPADM

## 2023-08-17 RX ORDER — HYDROMORPHONE HYDROCHLORIDE 1 MG/ML
1 INJECTION, SOLUTION INTRAMUSCULAR; INTRAVENOUS; SUBCUTANEOUS
Status: COMPLETED | OUTPATIENT
Start: 2023-08-17 | End: 2023-08-17

## 2023-08-17 RX ORDER — HYDRALAZINE HYDROCHLORIDE 20 MG/ML
10 INJECTION INTRAMUSCULAR; INTRAVENOUS EVERY 4 HOURS PRN
Status: DISCONTINUED | OUTPATIENT
Start: 2023-08-17 | End: 2023-08-19 | Stop reason: HOSPADM

## 2023-08-17 RX ORDER — FLUOXETINE HYDROCHLORIDE 20 MG/1
20 CAPSULE ORAL DAILY
Status: DISCONTINUED | OUTPATIENT
Start: 2023-08-18 | End: 2023-08-19 | Stop reason: HOSPADM

## 2023-08-17 RX ORDER — IBUPROFEN 200 MG
16 TABLET ORAL
Status: DISCONTINUED | OUTPATIENT
Start: 2023-08-17 | End: 2023-08-19 | Stop reason: HOSPADM

## 2023-08-17 RX ORDER — IODIXANOL 320 MG/ML
100 INJECTION, SOLUTION INTRAVASCULAR
Status: COMPLETED | OUTPATIENT
Start: 2023-08-17 | End: 2023-08-17

## 2023-08-17 RX ORDER — GABAPENTIN 100 MG/1
200 CAPSULE ORAL 2 TIMES DAILY
Status: DISCONTINUED | OUTPATIENT
Start: 2023-08-17 | End: 2023-08-19 | Stop reason: HOSPADM

## 2023-08-17 RX ORDER — ISOSORBIDE MONONITRATE 30 MG/1
30 TABLET, EXTENDED RELEASE ORAL DAILY
Status: DISCONTINUED | OUTPATIENT
Start: 2023-08-18 | End: 2023-08-19 | Stop reason: HOSPADM

## 2023-08-17 RX ORDER — MORPHINE SULFATE 4 MG/ML
4 INJECTION, SOLUTION INTRAMUSCULAR; INTRAVENOUS
Status: COMPLETED | OUTPATIENT
Start: 2023-08-17 | End: 2023-08-17

## 2023-08-17 RX ORDER — ACETAMINOPHEN 325 MG/1
650 TABLET ORAL EVERY 8 HOURS PRN
Status: DISCONTINUED | OUTPATIENT
Start: 2023-08-17 | End: 2023-08-19 | Stop reason: HOSPADM

## 2023-08-17 RX ORDER — DROPERIDOL 2.5 MG/ML
1.25 INJECTION, SOLUTION INTRAMUSCULAR; INTRAVENOUS ONCE
Status: COMPLETED | OUTPATIENT
Start: 2023-08-17 | End: 2023-08-17

## 2023-08-17 RX ORDER — AMLODIPINE BESYLATE 5 MG/1
5 TABLET ORAL DAILY
Status: DISCONTINUED | OUTPATIENT
Start: 2023-08-18 | End: 2023-08-19 | Stop reason: HOSPADM

## 2023-08-17 RX ORDER — IBUPROFEN 200 MG
24 TABLET ORAL
Status: DISCONTINUED | OUTPATIENT
Start: 2023-08-17 | End: 2023-08-19 | Stop reason: HOSPADM

## 2023-08-17 RX ORDER — GLUCAGON 1 MG
1 KIT INJECTION
Status: DISCONTINUED | OUTPATIENT
Start: 2023-08-17 | End: 2023-08-19 | Stop reason: HOSPADM

## 2023-08-17 RX ORDER — HEPARIN SODIUM 5000 [USP'U]/ML
5000 INJECTION, SOLUTION INTRAVENOUS; SUBCUTANEOUS EVERY 8 HOURS
Status: DISCONTINUED | OUTPATIENT
Start: 2023-08-17 | End: 2023-08-17

## 2023-08-17 RX ORDER — ONDANSETRON 4 MG/1
4 TABLET, ORALLY DISINTEGRATING ORAL
Status: COMPLETED | OUTPATIENT
Start: 2023-08-17 | End: 2023-08-17

## 2023-08-17 RX ORDER — HYDRALAZINE HYDROCHLORIDE 25 MG/1
100 TABLET, FILM COATED ORAL 2 TIMES DAILY
Status: DISCONTINUED | OUTPATIENT
Start: 2023-08-17 | End: 2023-08-19 | Stop reason: HOSPADM

## 2023-08-17 RX ORDER — LEVETIRACETAM 500 MG/1
500 TABLET ORAL 2 TIMES DAILY
Status: DISCONTINUED | OUTPATIENT
Start: 2023-08-17 | End: 2023-08-19 | Stop reason: HOSPADM

## 2023-08-17 RX ORDER — INSULIN ASPART 100 [IU]/ML
0-5 INJECTION, SOLUTION INTRAVENOUS; SUBCUTANEOUS
Status: DISCONTINUED | OUTPATIENT
Start: 2023-08-17 | End: 2023-08-19 | Stop reason: HOSPADM

## 2023-08-17 RX ORDER — FAMOTIDINE 10 MG/ML
20 INJECTION INTRAVENOUS
Status: COMPLETED | OUTPATIENT
Start: 2023-08-17 | End: 2023-08-17

## 2023-08-17 RX ADMIN — INSULIN ASPART 5 UNITS: 100 INJECTION, SOLUTION INTRAVENOUS; SUBCUTANEOUS at 11:08

## 2023-08-17 RX ADMIN — GABAPENTIN 200 MG: 100 CAPSULE ORAL at 11:08

## 2023-08-17 RX ADMIN — LEVETIRACETAM 500 MG: 500 TABLET, FILM COATED ORAL at 11:08

## 2023-08-17 RX ADMIN — ONDANSETRON 4 MG: 4 TABLET, ORALLY DISINTEGRATING ORAL at 03:08

## 2023-08-17 RX ADMIN — HYDROMORPHONE HYDROCHLORIDE 1 MG: 1 INJECTION, SOLUTION INTRAMUSCULAR; INTRAVENOUS; SUBCUTANEOUS at 10:08

## 2023-08-17 RX ADMIN — DROPERIDOL 1.25 MG: 2.5 INJECTION, SOLUTION INTRAMUSCULAR; INTRAVENOUS at 01:08

## 2023-08-17 RX ADMIN — MORPHINE SULFATE 4 MG: 4 INJECTION, SOLUTION INTRAMUSCULAR; INTRAVENOUS at 12:08

## 2023-08-17 RX ADMIN — APIXABAN 5 MG: 5 TABLET, FILM COATED ORAL at 11:08

## 2023-08-17 RX ADMIN — HYDROMORPHONE HYDROCHLORIDE 1 MG: 1 INJECTION, SOLUTION INTRAMUSCULAR; INTRAVENOUS; SUBCUTANEOUS at 03:08

## 2023-08-17 RX ADMIN — HYDRALAZINE HYDROCHLORIDE 100 MG: 25 TABLET ORAL at 10:08

## 2023-08-17 RX ADMIN — CARVEDILOL 25 MG: 12.5 TABLET, FILM COATED ORAL at 11:08

## 2023-08-17 RX ADMIN — IODIXANOL 100 ML: 320 INJECTION, SOLUTION INTRAVASCULAR at 04:08

## 2023-08-17 RX ADMIN — FAMOTIDINE 20 MG: 10 INJECTION INTRAVENOUS at 05:08

## 2023-08-17 NOTE — ED PROVIDER NOTES
Encounter Date: 2023       History     Chief Complaint   Patient presents with    Abdominal Pain     ABD pain started today, nausea , pt was at dialysis but unable to get treatment due to ABD pain    Back Pain     Thoracic back pain     HPI    33-year-old female with history of ESRD on HD, hypertension, hyperlipidemia, diabetes presenting with acute onset abdominal pain approximately 2 hours ago with associated nausea and vomiting.  Reports pain radiating to the back.  She denies any diarrhea.  She is been able to tolerate p.o. since the start of the pain.  Denies any associated fever.  Last dialysis was on Tuesday.    Review of patient's allergies indicates:   Allergen Reactions    Penicillins Hives     Past Medical History:   Diagnosis Date    ESRD on hemodialysis 2022    Gastritis 2022    EGD was 22    Gastroparesis 2022    has not had Emptying study    Heart failure with preserved ejection fraction 2022    EF 55% on 3/22    History of supraventricular tachycardia     Hyperkalemia 2022    Hypertensive emergency 2022    Sickle cell trait 2022    Type 2 diabetes mellitus      Past Surgical History:   Procedure Laterality Date     SECTION      x 3    COLONOSCOPY      COLONOSCOPY N/A 2022    Procedure: COLONOSCOPY;  Surgeon: Jagdeep Cedeno MD;  Location: [facility];  Service: Endoscopy;  Laterality: N/A;    ESOPHAGOGASTRODUODENOSCOPY N/A 10/18/2019    Procedure: ESOPHAGOGASTRODUODENOSCOPY (EGD);  Surgeon: Gianluca Mendez MD;  Location: [facility];  Service: Endoscopy;  Laterality: N/A;    ESOPHAGOGASTRODUODENOSCOPY N/A 2022    Procedure: EGD (ESOPHAGOGASTRODUODENOSCOPY);  Surgeon: Micky Paredes III, MD;  Location: [facility];  Service: Endoscopy;  Laterality: N/A;    ESOPHAGOGASTRODUODENOSCOPY N/A 2022    Procedure: EGD (ESOPHAGOGASTRODUODENOSCOPY);  Surgeon: Marcelo Zhong MD;  Location: [facility];  Service: Endoscopy;  Laterality: N/A;     INSERTION, CATHETER, TUNNELED N/A 6/17/2023    Procedure: Insertion,catheter,tunneled;  Surgeon: Carlos Thurman Jr., MD;  Location: [facility] OR;  Service: General;  Laterality: N/A;    LAPAROSCOPIC CHOLECYSTECTOMY N/A 07/30/2022    Procedure: CHOLECYSTECTOMY, LAPAROSCOPIC;  Surgeon: Grey Perez MD;  Location: [facility] OR;  Service: General;  Laterality: N/A;    PLACEMENT OF DUAL-LUMEN VASCULAR CATHETER Left 07/12/2022    Procedure: INSERTION-CATHETER-JOSEPH;  Surgeon: Dionte Gan MD;  Location: [facility] OR;  Service: General;  Laterality: Left;    PLACEMENT OF DUAL-LUMEN VASCULAR CATHETER Right 07/26/2022    Procedure: INSERTION-CATHETER-Hemosplit;  Surgeon: Dionte Gan MD;  Location: [facility] OR;  Service: General;  Laterality: Right;     Family History   Problem Relation Age of Onset    Diabetes Mother     Diabetes Father      Social History     Tobacco Use    Smoking status: Never    Smokeless tobacco: Never   Substance Use Topics    Alcohol use: Not Currently    Drug use: No     Review of Systems   Constitutional:  Negative for chills and fever.   HENT:  Negative for congestion and rhinorrhea.    Respiratory:  Negative for cough and shortness of breath.    Cardiovascular:  Negative for chest pain.   Gastrointestinal:  Positive for abdominal pain, nausea and vomiting. Negative for blood in stool and diarrhea.   Neurological:  Negative for headaches.   Psychiatric/Behavioral:  Negative for confusion.        Physical Exam     Initial Vitals [08/17/23 1152]   BP Pulse Resp Temp SpO2   115/77 88 16 98 °F (36.7 °C) 96 %      MAP       --         Physical Exam    Constitutional: She appears well-developed and well-nourished. She is not diaphoretic. She appears distressed.   HENT:   Head: Normocephalic and atraumatic.   Eyes: Conjunctivae and EOM are normal. Pupils are equal, round, and reactive to light.   Neck:   Normal range of motion.  Cardiovascular:  Normal rate.           No murmur heard.  No evidence of  JVD.  Tunneled line to right IJ without any evidence of purulence or erythema   Pulmonary/Chest: Breath sounds normal. No respiratory distress. She has no wheezes.   Abdominal: Abdomen is soft. She exhibits no mass. There is abdominal tenderness. There is no rebound and no guarding.   Musculoskeletal:         General: Normal range of motion.      Cervical back: Normal range of motion.      Comments: Moving all 4 extremities without limitation     Neurological: She is alert.   Skin: Skin is warm and dry. No rash noted.         ED Course   Procedures  Labs Reviewed   CBC W/ AUTO DIFFERENTIAL - Abnormal; Notable for the following components:       Result Value    RBC 2.88 (*)     Hemoglobin 8.7 (*)     Hematocrit 24.8 (*)     RDW 16.7 (*)     Platelets 63 (*)     Lymph # 0.7 (*)     Gran % 77.5 (*)     Lymph % 13.7 (*)     All other components within normal limits    Narrative:     For upper or mid abdominal pain.   COMPREHENSIVE METABOLIC PANEL - Abnormal; Notable for the following components:    Sodium 134 (*)     Glucose 325 (*)     BUN 48 (*)     Creatinine 5.5 (*)     Total Bilirubin 1.7 (*)     Alkaline Phosphatase 145 (*)     eGFR 9.8 (*)     All other components within normal limits    Narrative:     For upper or mid abdominal pain.   PHOSPHORUS - Abnormal; Notable for the following components:    Phosphorus 4.6 (*)     All other components within normal limits   LIPASE    Narrative:     For upper or mid abdominal pain.   HCG, QUANTITATIVE   MAGNESIUM   PHOSPHORUS   MAGNESIUM   HCG, QUANTITATIVE   TYPE & SCREEN        ECG Results              EKG 12-lead (Final result)  Result time 08/17/23 21:05:26      Final result by Interface, Lab In [facility] (08/17/23 21:05:26)                   Narrative:    Test Reason : R10.9,    Vent. Rate : 090 BPM     Atrial Rate : 090 BPM     P-R Int : 176 ms          QRS Dur : 086 ms      QT Int : 414 ms       P-R-T Axes : 033 -30 076 degrees     QTc Int : 506 ms    Normal sinus  rhythm  Left axis deviation  Prolonged QT  Abnormal ECG  When compared with ECG of 31-JUL-2023 19:26,  No significant change was found  Confirmed by Obed Smith MD ([id number]) on 8/17/2023 9:05:15 PM    Referred By: VEENA   SELF           Confirmed By:Obed Smith MD                                  Imaging Results              CT Abdomen Pelvis With Contrast (Final result)  Result time 08/17/23 16:45:57      Final result by Natalie López DO (08/17/23 16:45:57)                   Narrative:    CT ABDOMEN AND PELVIS WITH INTRAVENOUS CONTRAST: 8/17/2023 4:40 PM CDT    HISTORY: 33 years  old Female with Abdominal pain, acute, nonlocalized.    COMPARISON: 7/11/2023    TECHNIQUE: Axial contiguous images were obtained from the lung bases to the proximal femurs with intravenous intravenous contrast administered. Sagittal and coronal reconstructions were also obtained and reviewed. This exam was performed according to our departmental dose-optimization program, which includes automated exposure control, adjustment of the mA and/or KV according to the patient's size and/or use of iterative reconstruction technique.    FINDINGS:    LUNG BASES: No focal consolidative airspace opacities are seen.  No discrete pleural effusions are seen.  The cardiac silhouette is prominent in size. Trace pericardial fluid is seen. There is a central line catheter tip seen at the right atrium.    Valves changes with interlobular septal thickening suggesting edema.    LIVER: The visualized hepatic parenchyma demonstrates no focal abnormality. The hepatic silhouette is prominent in size.  The main portal vein is well opacified with contrast.    GALLBLADDER: The gallbladder surgically absent. There is intra and extrahepatic ductal prominence, likely due to postcholecystectomy changes.    SPLEEN: The spleen is at the upper limits of normal in size.    PANCREAS: The pancreas is unremarkable.    ADRENAL GLANDS: The bilateral adrenal glands  are unremarkable.    KIDNEYS: Both kidneys demonstrate no hydronephrosis. No renal or ureteral calculi are seen.    PELVIS: The urinary bladder is mildly distended. The uterus is present.    STOMACH: The stomach is not well distended.    BOWEL: The small bowel loops appear unremarkable. No pericolonic inflammatory stranding is seen. There is a moderate amount of stool seen throughout the colon.    APPENDIX: The appendix is unremarkable.    PERITONEUM: There is no evidence of pneumoperitoneum. Trace free fluid is seen.    VESSELS: The IVC is unremarkable. The abdominal aorta is within normal limits of size.    LYMPH NODES: No significantly enlarged lymph nodes are seen in the abdomen or pelvis.    BONES AND SOFT TISSUES: No acute osseous abnormality is seen. There is reticulation within the subcutaneous soft tissues suggesting anasarca/edema.    IMPRESSION: No acute process is seen within the abdomen or pelvis.    There is anasarca/edema present.    Electronically signed by:  Natalie López DO  8/17/2023 4:45 PM CDT Workstation: [id number]                                     Medications   droPERidol injection 1.25 mg (1.25 mg Intravenous Given 8/17/23 1322)   morphine injection 4 mg (4 mg Intravenous Given 8/17/23 1257)   ondansetron disintegrating tablet 4 mg (4 mg Oral Given 8/17/23 1539)   HYDROmorphone injection 1 mg (1 mg Intravenous Given 8/17/23 1539)   iodixanoL (VISIPAQUE 320) injection 100 mL (100 mLs Intravenous Given 8/17/23 1610)   famotidine (PF) injection 20 mg (20 mg Intravenous Given 8/17/23 1753)     Medical Decision Making:   Initial Assessment:   33-year-old female with history of ESRD on HD, hypertension, hyperlipidemia, diabetes presenting with acute onset severe abdominal pain that is poorly localized.  Patient was on the way to dialysis when the pain started it was not receive her dialysis today.  Last HD was on Tuesday.  Vital signs here all stable and within normal limits.  Exam with poorly  localized abdominal pain with patient moving around in bed.  Lungs are clear.  No evidence of volume overload.  Differential Diagnosis:   Pancreatitis, peptic ulcer disease, nephrolithiasis, pregnancy, cholecystitis.   Clinical Tests:   Lab Tests: Ordered and Reviewed  The following lab test(s) were unremarkable: CBC, CMP and B-HCG  Radiological Study: Ordered  ED Management:  Patient given droperidol, morphine, Dilaudid for pain control without relief.  QTC was prolonged greater than 500 making treatment of patient's nausea and vomiting difficult.  Lab work reviewed without any evidence of emergent electrolyte derangement.  No indication for emergent dialysis.  CT abdomen and pelvis without any acute findings and no evidence of pancreatitis, cholecystitis, appendicitis, or other emergent etiology. Patient was monitored for 8 hours without any improvement of symptoms.  Per chart review patient with numerous presentations and admissions for similar symptoms.  Spoke with Nephrology who is familiar with the patient and recommended HD in the morning with continued treatment of nausea vomiting.  Medicine has been consulted for intractable nausea vomiting.             ED Course as of 08/17/23 2143   Thu Aug 17, 2023   1334 EKG interpreted by me with rate of 90, normal sinus rhythm, left axis deviation, , no significant ST elevation or depression. [KH]      ED Course User Index  [KH] Dionte Miller MD                 Clinical Impression:   Final diagnoses:  [R94.31] QT prolongation        ED Disposition Condition    Admit Stable                Dionte Miller MD  08/17/23 2143

## 2023-08-18 PROBLEM — F32.9 MDD (MAJOR DEPRESSIVE DISORDER): Status: ACTIVE | Noted: 2023-08-18

## 2023-08-18 PROBLEM — E08.8 DIABETES MELLITUS DUE TO UNDERLYING CONDITION WITH UNSPECIFIED COMPLICATIONS: Status: RESOLVED | Noted: 2022-04-12 | Resolved: 2023-08-18

## 2023-08-18 PROBLEM — D63.1 ANEMIA DUE TO CHRONIC KIDNEY DISEASE, ON CHRONIC DIALYSIS: Status: RESOLVED | Noted: 2022-10-26 | Resolved: 2023-08-18

## 2023-08-18 PROBLEM — I82.722 CHRONIC DEEP VEIN THROMBOSIS (DVT) OF LEFT UPPER EXTREMITY: Status: RESOLVED | Noted: 2023-04-10 | Resolved: 2023-08-18

## 2023-08-18 PROBLEM — I10 UNCONTROLLED HYPERTENSION: Status: RESOLVED | Noted: 2022-07-30 | Resolved: 2023-08-18

## 2023-08-18 PROBLEM — K21.9 GERD (GASTROESOPHAGEAL REFLUX DISEASE): Status: ACTIVE | Noted: 2023-08-18

## 2023-08-18 PROBLEM — N18.6 ANEMIA DUE TO CHRONIC KIDNEY DISEASE, ON CHRONIC DIALYSIS: Status: RESOLVED | Noted: 2022-10-26 | Resolved: 2023-08-18

## 2023-08-18 PROBLEM — Z99.2 ANEMIA DUE TO CHRONIC KIDNEY DISEASE, ON CHRONIC DIALYSIS: Status: RESOLVED | Noted: 2022-10-26 | Resolved: 2023-08-18

## 2023-08-18 PROBLEM — R11.10 VOMITING: Status: RESOLVED | Noted: 2023-04-13 | Resolved: 2023-08-18

## 2023-08-18 PROBLEM — Z79.4 INSULIN DEPENDENT TYPE 2 DIABETES MELLITUS: Status: RESOLVED | Noted: 2023-06-17 | Resolved: 2023-08-18

## 2023-08-18 PROBLEM — E11.9 INSULIN DEPENDENT TYPE 2 DIABETES MELLITUS: Status: RESOLVED | Noted: 2023-06-17 | Resolved: 2023-08-18

## 2023-08-18 PROBLEM — G89.29 CHRONIC GENERALIZED ABDOMINAL PAIN: Status: RESOLVED | Noted: 2023-06-03 | Resolved: 2023-08-18

## 2023-08-18 PROBLEM — K29.70 GASTRITIS: Status: RESOLVED | Noted: 2019-10-15 | Resolved: 2023-08-18

## 2023-08-18 PROBLEM — R10.84 CHRONIC GENERALIZED ABDOMINAL PAIN: Status: RESOLVED | Noted: 2023-06-03 | Resolved: 2023-08-18

## 2023-08-18 PROBLEM — I47.10 SVT (SUPRAVENTRICULAR TACHYCARDIA): Chronic | Status: RESOLVED | Noted: 2018-09-15 | Resolved: 2023-08-18

## 2023-08-18 LAB
ANION GAP SERPL CALC-SCNC: 10 MMOL/L (ref 8–16)
BUN SERPL-MCNC: 64 MG/DL (ref 6–20)
CALCIUM SERPL-MCNC: 8.4 MG/DL (ref 8.7–10.5)
CHLORIDE SERPL-SCNC: 97 MMOL/L (ref 95–110)
CO2 SERPL-SCNC: 26 MMOL/L (ref 23–29)
CREAT SERPL-MCNC: 6.4 MG/DL (ref 0.5–1.4)
ERYTHROCYTE [DISTWIDTH] IN BLOOD BY AUTOMATED COUNT: 17.3 % (ref 11.5–14.5)
EST. GFR  (NO RACE VARIABLE): 8.2 ML/MIN/1.73 M^2
GLUCOSE SERPL-MCNC: 120 MG/DL (ref 70–110)
GLUCOSE SERPL-MCNC: 126 MG/DL (ref 70–110)
GLUCOSE SERPL-MCNC: 132 MG/DL (ref 70–110)
GLUCOSE SERPL-MCNC: 224 MG/DL (ref 70–110)
GLUCOSE SERPL-MCNC: 351 MG/DL (ref 70–110)
GLUCOSE SERPL-MCNC: 435 MG/DL (ref 70–110)
GLUCOSE SERPL-MCNC: 513 MG/DL (ref 70–110)
GLUCOSE SERPL-MCNC: 519 MG/DL (ref 70–110)
HCT VFR BLD AUTO: 26.6 % (ref 37–48.5)
HGB BLD-MCNC: 9 G/DL (ref 12–16)
MCH RBC QN AUTO: 30 PG (ref 27–31)
MCHC RBC AUTO-ENTMCNC: 33.8 G/DL (ref 32–36)
MCV RBC AUTO: 89 FL (ref 82–98)
PLATELET # BLD AUTO: 77 K/UL (ref 150–450)
PMV BLD AUTO: 10 FL (ref 9.2–12.9)
POTASSIUM SERPL-SCNC: 5.5 MMOL/L (ref 3.5–5.1)
RBC # BLD AUTO: 3 M/UL (ref 4–5.4)
SODIUM SERPL-SCNC: 133 MMOL/L (ref 136–145)
WBC # BLD AUTO: 5.41 K/UL (ref 3.9–12.7)

## 2023-08-18 PROCEDURE — 90935 HEMODIALYSIS ONE EVALUATION: CPT

## 2023-08-18 PROCEDURE — 27000221 HC OXYGEN, UP TO 24 HOURS

## 2023-08-18 PROCEDURE — 82962 GLUCOSE BLOOD TEST: CPT

## 2023-08-18 PROCEDURE — 94761 N-INVAS EAR/PLS OXIMETRY MLT: CPT

## 2023-08-18 PROCEDURE — 96376 TX/PRO/DX INJ SAME DRUG ADON: CPT | Mod: 59

## 2023-08-18 PROCEDURE — 96375 TX/PRO/DX INJ NEW DRUG ADDON: CPT

## 2023-08-18 PROCEDURE — 94760 N-INVAS EAR/PLS OXIMETRY 1: CPT

## 2023-08-18 PROCEDURE — A4216 STERILE WATER/SALINE, 10 ML: HCPCS | Performed by: STUDENT IN AN ORGANIZED HEALTH CARE EDUCATION/TRAINING PROGRAM

## 2023-08-18 PROCEDURE — 82947 ASSAY GLUCOSE BLOOD QUANT: CPT | Mod: 59 | Performed by: STUDENT IN AN ORGANIZED HEALTH CARE EDUCATION/TRAINING PROGRAM

## 2023-08-18 PROCEDURE — 25000003 PHARM REV CODE 250: Performed by: STUDENT IN AN ORGANIZED HEALTH CARE EDUCATION/TRAINING PROGRAM

## 2023-08-18 PROCEDURE — G0378 HOSPITAL OBSERVATION PER HR: HCPCS

## 2023-08-18 PROCEDURE — 36415 COLL VENOUS BLD VENIPUNCTURE: CPT | Performed by: STUDENT IN AN ORGANIZED HEALTH CARE EDUCATION/TRAINING PROGRAM

## 2023-08-18 PROCEDURE — 63600175 PHARM REV CODE 636 W HCPCS: Performed by: STUDENT IN AN ORGANIZED HEALTH CARE EDUCATION/TRAINING PROGRAM

## 2023-08-18 PROCEDURE — 96365 THER/PROPH/DIAG IV INF INIT: CPT | Mod: 59

## 2023-08-18 PROCEDURE — 96372 THER/PROPH/DIAG INJ SC/IM: CPT | Performed by: STUDENT IN AN ORGANIZED HEALTH CARE EDUCATION/TRAINING PROGRAM

## 2023-08-18 PROCEDURE — 63600175 PHARM REV CODE 636 W HCPCS: Performed by: INTERNAL MEDICINE

## 2023-08-18 PROCEDURE — 80048 BASIC METABOLIC PNL TOTAL CA: CPT | Performed by: STUDENT IN AN ORGANIZED HEALTH CARE EDUCATION/TRAINING PROGRAM

## 2023-08-18 PROCEDURE — 85027 COMPLETE CBC AUTOMATED: CPT | Performed by: STUDENT IN AN ORGANIZED HEALTH CARE EDUCATION/TRAINING PROGRAM

## 2023-08-18 RX ORDER — INSULIN ASPART 100 [IU]/ML
10 INJECTION, SOLUTION INTRAVENOUS; SUBCUTANEOUS ONCE
Status: COMPLETED | OUTPATIENT
Start: 2023-08-18 | End: 2023-08-18

## 2023-08-18 RX ORDER — HEPARIN SODIUM 1000 [USP'U]/ML
4000 INJECTION, SOLUTION INTRAVENOUS; SUBCUTANEOUS
Status: DISCONTINUED | OUTPATIENT
Start: 2023-08-18 | End: 2023-08-19 | Stop reason: HOSPADM

## 2023-08-18 RX ORDER — HEPARIN SODIUM 1000 [USP'U]/ML
4000 INJECTION, SOLUTION INTRAVENOUS; SUBCUTANEOUS
Status: DISCONTINUED | OUTPATIENT
Start: 2023-08-18 | End: 2023-08-18

## 2023-08-18 RX ADMIN — INSULIN ASPART 5 UNITS: 100 INJECTION, SOLUTION INTRAVENOUS; SUBCUTANEOUS at 01:08

## 2023-08-18 RX ADMIN — PANTOPRAZOLE SODIUM 40 MG: 40 TABLET, DELAYED RELEASE ORAL at 06:08

## 2023-08-18 RX ADMIN — SODIUM CHLORIDE 10 ML: 9 INJECTION INTRAMUSCULAR; INTRAVENOUS; SUBCUTANEOUS at 09:08

## 2023-08-18 RX ADMIN — CARVEDILOL 25 MG: 12.5 TABLET, FILM COATED ORAL at 08:08

## 2023-08-18 RX ADMIN — LEVETIRACETAM 500 MG: 500 TABLET, FILM COATED ORAL at 08:08

## 2023-08-18 RX ADMIN — HEPARIN SODIUM 4000 UNITS: 1000 INJECTION, SOLUTION INTRAVENOUS; SUBCUTANEOUS at 05:08

## 2023-08-18 RX ADMIN — INSULIN DETEMIR 18 UNITS: 100 INJECTION, SOLUTION SUBCUTANEOUS at 08:08

## 2023-08-18 RX ADMIN — PROMETHAZINE HYDROCHLORIDE 12.5 MG: 25 INJECTION INTRAMUSCULAR; INTRAVENOUS at 08:08

## 2023-08-18 RX ADMIN — APIXABAN 5 MG: 5 TABLET, FILM COATED ORAL at 08:08

## 2023-08-18 RX ADMIN — INSULIN ASPART 10 UNITS: 100 INJECTION, SOLUTION INTRAVENOUS; SUBCUTANEOUS at 03:08

## 2023-08-18 RX ADMIN — GABAPENTIN 200 MG: 100 CAPSULE ORAL at 10:08

## 2023-08-18 RX ADMIN — OXYCODONE AND ACETAMINOPHEN 1 TABLET: 325; 5 TABLET ORAL at 04:08

## 2023-08-18 RX ADMIN — HYDROMORPHONE HYDROCHLORIDE 1 MG: 1 INJECTION, SOLUTION INTRAMUSCULAR; INTRAVENOUS; SUBCUTANEOUS at 08:08

## 2023-08-18 RX ADMIN — INSULIN ASPART 5 UNITS: 100 INJECTION, SOLUTION INTRAVENOUS; SUBCUTANEOUS at 03:08

## 2023-08-18 RX ADMIN — GABAPENTIN 200 MG: 100 CAPSULE ORAL at 08:08

## 2023-08-18 NOTE — SUBJECTIVE & OBJECTIVE
"Interval History: see "Hospital Course"    Review of Systems   Gastrointestinal:  Positive for abdominal pain.     Objective:     Vital Signs (Most Recent):  Temp: 97.5 °F (36.4 °C) (08/18/23 1044)  Pulse: 72 (08/18/23 1044)  Resp: 15 (08/18/23 1044)  BP: 115/76 (08/18/23 1044)  SpO2: 99 % (08/18/23 1044) Vital Signs (24h Range):  Temp:  [97.4 °F (36.3 °C)-97.8 °F (36.6 °C)] 97.5 °F (36.4 °C)  Pulse:  [72-80] 72  Resp:  [15-20] 15  SpO2:  [95 %-99 %] 99 %  BP: ()/() 115/76     Weight: 55.9 kg (123 lb 3.2 oz)  Body mass index is 22.53 kg/m².    Intake/Output Summary (Last 24 hours) at 8/18/2023 1322  Last data filed at 8/18/2023 0834  Gross per 24 hour   Intake 50 ml   Output 0 ml   Net 50 ml         Physical Exam  Vitals and nursing note reviewed.   Constitutional:       General: She is not in acute distress.  HENT:      Head: Normocephalic and atraumatic.      Right Ear: External ear normal.      Left Ear: External ear normal.      Nose: Nose normal.      Mouth/Throat:      Mouth: Mucous membranes are moist.      Pharynx: Oropharynx is clear.   Eyes:      Extraocular Movements: Extraocular movements intact.      Conjunctiva/sclera: Conjunctivae normal.   Cardiovascular:      Rate and Rhythm: Normal rate and regular rhythm.      Pulses: Normal pulses.      Heart sounds: Normal heart sounds.      Comments: HD catheter.  Pulmonary:      Effort: Pulmonary effort is normal.      Breath sounds: Normal breath sounds.   Abdominal:      General: Bowel sounds are normal. There is no distension.      Palpations: Abdomen is soft.      Tenderness: There is no abdominal tenderness.   Musculoskeletal:         General: Normal range of motion.      Cervical back: Normal range of motion and neck supple.   Skin:     General: Skin is warm and dry.   Neurological:      Mental Status: She is alert. Mental status is at baseline.   Psychiatric:         Mood and Affect: Mood normal.         Behavior: Behavior normal.         "     Significant Labs: All pertinent labs within the past 24 hours have been reviewed.    Significant Imaging: I have reviewed all pertinent imaging results/findings within the past 24 hours.

## 2023-08-18 NOTE — ASSESSMENT & PLAN NOTE
Secondary to gastroparesis in the setting of ESRD.  -PRN analgesics and antiemetics  -HD per Nephrology 8/18 and 8/19

## 2023-08-18 NOTE — PLAN OF CARE
[facility]  Initial Discharge Assessment       Primary Care Provider: Melony Kim NP    Admission Diagnosis: Abdominal pain [R10.9]    Admission Date: 8/17/2023  Expected Discharge Date:     SW went to bedside to complete discharge assessment. Pt asleep and unable to be aroused. SW called Pt's step mother; phone rang without answer. Assessment completed via chart review. Pt uses rolling walker and glucometer to complete ADLs. No home health. Pt is on Dialysis TTHS 11am [facility]. Per chart, Pt lives at home with family. Plan to dc home w/ family. Pt has no other needs to be addressed at this time.    Transition of Care Barriers: None    Payor: MEDICAID / Plan: [place] / Product Type: Managed Medicaid /     Extended Emergency Contact Information  Primary Emergency Contact: LeeTanya  Address: [address] [place], [place] [address] [facility]  Home Phone: [phone]  Mobile Phone: [phone]  Relation: Step parent  Preferred language: English   needed? No  Secondary Emergency Contact: LeeGarcia  Address: [address] [place] [address]  Mobile Phone: [phone]  Relation: Father  Preferred language: English   needed? No    Discharge Plan A: Home with family  Discharge Plan B: Home with family      [facility] [address]  [facility] [address]  Phone: [phone] Fax: [phone]      Initial Assessment (most recent)       Adult Discharge Assessment - 08/18/23 1134          Discharge Assessment    Assessment Type Discharge Planning Assessment     Source of Information health record     Communicated TATIANA with patient/caregiver Date not available/Unable to determine     Reason For Admission Generalized abdominal pain     People in Home child(tammy), dependent;parent(s)     Facility Arrived From: Home     Do you expect to return to your current living situation? Yes     Do you have  help at home or someone to help you manage your care at home? Yes     Who are your caregiver(s) and their phone number(s)? Tanya Howard (Step parent)   [phone]     Prior to hospitilization cognitive status: Unable to Assess     Current cognitive status: Unable to Assess     Walking or Climbing Stairs ambulation difficulty, requires equipment     Mobility Management Rolling walker     Home Accessibility wheelchair accessible     Home Layout Able to live on 1st floor     Equipment Currently Used at Home walker, rolling;glucometer     Readmission within 30 days? Yes     Do you currently have service(s) that help you manage your care at home? No     Do you take prescription medications? Yes     Do you have prescription coverage? Yes     Coverage Payor:  MEDICAID - [employer] [facility]     Do you have any problems affording any of your prescribed medications? TBD     Is the patient taking medications as prescribed? yes     Who is going to help you get home at discharge? Garcia Howard Father   [phone]     How do you get to doctors appointments? car, drives self     Are you on dialysis? Yes     Dialysis Name and Scheduled days [facility] T/[facility]S 11AM chair time     Do you take coumadin? No     Transition of Care Barriers None     Discharge Plan A Home with family     Discharge Plan B Home with family

## 2023-08-18 NOTE — NURSING
Pt transported back to room from dialysis via hospital bed. Pt is very sleepy, has not eaten today. Hemodialysis catheter dressing to right chest wall, dry clean and intact. Will continue to monitor.

## 2023-08-18 NOTE — PROGRESS NOTES
"On license of [facility] Medicine  Progress Note    Patient Name: Tabby Howard  MRN: [id number]  Patient Class: OP- Observation   Admission Date: 8/17/2023  Length of Stay: 0 days  Attending Physician: Raymundo Parker MD  Primary Care Provider: Melony Kim NP        Subjective:     Principal Problem:Generalized abdominal pain        HPI:  Patient is a 33-year-old female with ESRD on hemodialysis(T-Th-S),  gastroparesis, seizure, prior C diff infection, DMII, poor vision, sickle cell trait who presents with abdominal pain.  Abdominal pain is generalized and 10/10.  Pain radiates to her back.  Pain began about 2 hours prior to ED arrival.  Patient says she was unable to attend HD due to the pain.  Last HD was on Tuesday.  Has had associated nausea or vomiting.  No diarrhea.  No fevers.  Denies chest pain or shortness for breath.  Patient is afebrile and hemodynamically stable.  Satting well on room air. WBC 4.7 and hemoglobin 8.7.  Sodium 134, potassium 4.3, bicarb 27, glucose 325, BUN 48, creatinine 5.5.  Lipase within normal limits.  CT abdomen and pelvis with no acute findings.     Patient has been admitted to the hospital 15 times this year.      Overview/Hospital Course:  Tabby Howard is a 33 year old female with a past medical history of ESRD, diabetic gastroparesis with opiate dependence, DM, HTN, HLD, anemia, MDD/SIOBHAN, DVT, GERD, seizure disorder, and HFpEF who presented with diffuse abdominal pain secondary to the gastroparesis. She is on PRN analgesics and antiemetics. Nephrology has been consulted for HD both 8/18 and 8/19.      Interval History: see "Hospital Course"    Review of Systems   Gastrointestinal:  Positive for abdominal pain.     Objective:     Vital Signs (Most Recent):  Temp: 97.5 °F (36.4 °C) (08/18/23 1044)  Pulse: 72 (08/18/23 1044)  Resp: 15 (08/18/23 1044)  BP: 115/76 (08/18/23 1044)  SpO2: 99 % (08/18/23 1044) Vital Signs (24h Range):  Temp:  [97.4 °F (36.3 °C)-97.8 " °F (36.6 °C)] 97.5 °F (36.4 °C)  Pulse:  [72-80] 72  Resp:  [15-20] 15  SpO2:  [95 %-99 %] 99 %  BP: ()/() 115/76     Weight: 55.9 kg (123 lb 3.2 oz)  Body mass index is 22.53 kg/m².    Intake/Output Summary (Last 24 hours) at 8/18/2023 1322  Last data filed at 8/18/2023 0834  Gross per 24 hour   Intake 50 ml   Output 0 ml   Net 50 ml         Physical Exam  Vitals and nursing note reviewed.   Constitutional:       General: She is not in acute distress.  HENT:      Head: Normocephalic and atraumatic.      Right Ear: External ear normal.      Left Ear: External ear normal.      Nose: Nose normal.      Mouth/Throat:      Mouth: Mucous membranes are moist.      Pharynx: Oropharynx is clear.   Eyes:      Extraocular Movements: Extraocular movements intact.      Conjunctiva/sclera: Conjunctivae normal.   Cardiovascular:      Rate and Rhythm: Normal rate and regular rhythm.      Pulses: Normal pulses.      Heart sounds: Normal heart sounds.      Comments: HD catheter.  Pulmonary:      Effort: Pulmonary effort is normal.      Breath sounds: Normal breath sounds.   Abdominal:      General: Bowel sounds are normal. There is no distension.      Palpations: Abdomen is soft.      Tenderness: There is no abdominal tenderness.   Musculoskeletal:         General: Normal range of motion.      Cervical back: Normal range of motion and neck supple.   Skin:     General: Skin is warm and dry.   Neurological:      Mental Status: She is alert. Mental status is at baseline.   Psychiatric:         Mood and Affect: Mood normal.         Behavior: Behavior normal.             Significant Labs: All pertinent labs within the past 24 hours have been reviewed.    Significant Imaging: I have reviewed all pertinent imaging results/findings within the past 24 hours.      Assessment/Plan:      * Generalized abdominal pain  Secondary to gastroparesis in the setting of ESRD.  -PRN analgesics and antiemetics  -HD per Nephrology 8/18 and  "8/19      Seizure disorder  -Continue home Keppra      GERD (gastroesophageal reflux disease)  -PPI      MDD (major depressive disorder)  -Continue fluoxetine      Hypertension  -Continue home Coreg, amlodipine, hydralazine and nitrate  -Continue to monitor      Frequent hospital admissions  Noted. Has been admitted 15 times this year.      Chronic congestive heart failure with left ventricular diastolic dysfunction  -Telemetry  -HD per Nephrology  -Continue Coregl, hydralazine and nitrate    Type 2 diabetes mellitus with hyperglycemia, with long-term current use of insulin, previous HbA1c 5.8%  Patient's FSGs are controlled on current medication regimen.  Last A1c reviewed-   Lab Results   Component Value Date    HGBA1C 5.8 08/01/2023     Most recent fingerstick glucose reviewed- No results for input(s): "POCTGLUCOSE" in the last 24 hours.  Current correctional scale  Low  Maintain    anti-hyperglycemic dose as follows-   Antihyperglycemics (From admission, onward)    Start     Stop Route Frequency Ordered    08/18/23 0900  insulin detemir U-100 (Levemir) pen 18 Units         -- SubQ Daily 08/18/23 0134    08/17/23 2246  insulin aspart U-100 pen 0-5 Units         -- SubQ Before meals & nightly PRN 08/17/23 2146        Hold Oral hypoglycemics while patient is in the hospital.    Anemia of chronic kidney failure, stage 5  Stable.  -Trend Hgb with CBC      Thrombocytopenia  Chronic.  -Trend platelets with CBC      Deep vein thrombosis (DVT) of non-extremity vein  -Continue Eliquis      ESRD (end stage renal disease)  -HD per Nephrology  -Renally dose medications  -Avoid nephrotoxic agents      Gastroparesis - suspected, unconfirmed  Noted.        VTE Risk Mitigation (From admission, onward)         Ordered     heparin (porcine) injection 4,000 Units  As needed (PRN)         08/18/23 1155     apixaban tablet 5 mg  2 times daily         08/17/23 2146     IP VTE HIGH RISK PATIENT  Once         08/17/23 2143     Place " sequential compression device  Until discontinued         08/17/23 8173                Discharge Planning   TATIANA: 8/19/2023     Code Status: Full Code   Is the patient medically ready for discharge?:     Reason for patient still in hospital (select all that apply): Patient trending condition, Treatment and Consult recommendations  Discharge Plan A: Home with family                  Raymundo Parker MD  Department of Hospital Medicine   [facility]

## 2023-08-18 NOTE — CONSULTS
INPATIENT NEPHROLOGY CONSULT   [facility]    Patient Name: Tabby Howard  Date: 08/18/2023    Reason for consultation: ESRD    Chief Complaint:   Chief Complaint   Patient presents with    Abdominal Pain     ABD pain started today, nausea , pt was at dialysis but unable to get treatment due to ABD pain    Back Pain     Thoracic back pain       History of Present Illness:  Patient is a 33-year-old female with ESRD on hemodialysis(T-Th-S),  gastroparesis, seizure, prior C diff infection, DMII, poor vision, sickle cell trait who presents with abdominal pain.  Abdominal pain is generalized and 10/10.  Pain radiates to her back.  Pain began about 2 hours prior to ED arrival.  Patient says she was unable to complete HD due to the pain- did about 15 min.  Last HD was on Tuesday.  Has had associated nausea or vomiting.  No diarrhea.  No fevers.  Denies chest pain or shortness for breath. Consulted for dialysis.    Interval History:  8/18- HD today and then TTS, CT AP neg- known gastroparesis from DM with hyperglycemia with narcotic dependence- anasarca/edema noted    Plan of Care:    Assessment:  ESRD on HD TTS  HTN  Hyperkalemia  SHPT  Anemia of CKD    Plan:    - ordered HD today and then TTS  - resume home BP meds  - UF 2-3L  - renal diet, 1.5L fluid restriction  - adjust dialysate  - resume binder with meals if taking  - ordered SHELLEY with HD    Thank you for allowing us to participate in this patient's care. We will continue to follow.    Vital Signs:  Temp Readings from Last 3 Encounters:   08/18/23 97.8 °F (36.6 °C) (Oral)   08/03/23 98.2 °F (36.8 °C)   07/31/23 97.7 °F (36.5 °C)       Pulse Readings from Last 3 Encounters:   08/18/23 77   08/03/23 80   08/01/23 80       BP Readings from Last 3 Encounters:   08/18/23 (!) 146/98   08/03/23 132/87   08/01/23 (!) 152/97       Weight:  Wt Readings from Last 3 Encounters:   08/17/23 55.9 kg (123 lb 3.2 oz)   08/01/23 54.9 kg (120 lb 15.2 oz)   07/31/23  50.8 kg (112 lb)       Past Medical & Surgical History:  Past Medical History:   Diagnosis Date    ESRD on hemodialysis 2022    Gastritis 2022    EGD was 22    Gastroparesis 2022    has not had Emptying study    Heart failure with preserved ejection fraction 2022    EF 55% on 3/22    History of supraventricular tachycardia     Hyperkalemia 2022    Hypertensive emergency 2022    Sickle cell trait 2022    Type 2 diabetes mellitus        Past Surgical History:   Procedure Laterality Date     SECTION      x 3    COLONOSCOPY      COLONOSCOPY N/A 2022    Procedure: COLONOSCOPY;  Surgeon: Jagdeep Cedeno MD;  Location: [facility];  Service: Endoscopy;  Laterality: N/A;    ESOPHAGOGASTRODUODENOSCOPY N/A 10/18/2019    Procedure: ESOPHAGOGASTRODUODENOSCOPY (EGD);  Surgeon: Gianluca Mendez MD;  Location: [facility];  Service: Endoscopy;  Laterality: N/A;    ESOPHAGOGASTRODUODENOSCOPY N/A 2022    Procedure: EGD (ESOPHAGOGASTRODUODENOSCOPY);  Surgeon: Micky Paredes III, MD;  Location: [facility];  Service: Endoscopy;  Laterality: N/A;    ESOPHAGOGASTRODUODENOSCOPY N/A 2022    Procedure: EGD (ESOPHAGOGASTRODUODENOSCOPY);  Surgeon: Marcelo Zhong MD;  Location: [facility];  Service: Endoscopy;  Laterality: N/A;    INSERTION, CATHETER, TUNNELED N/A 2023    Procedure: Insertion,catheter,tunneled;  Surgeon: Carlos Thurman Jr., MD;  Location: [facility];  Service: General;  Laterality: N/A;    LAPAROSCOPIC CHOLECYSTECTOMY N/A 2022    Procedure: CHOLECYSTECTOMY, LAPAROSCOPIC;  Surgeon: Grey Perez MD;  Location: [facility] OR;  Service: General;  Laterality: N/A;    PLACEMENT OF DUAL-LUMEN VASCULAR CATHETER Left 2022    Procedure: INSERTION-CATHETER-JOSEPH;  Surgeon: Dionte Gan MD;  Location: [facility] OR;  Service: General;  Laterality: Left;    PLACEMENT OF DUAL-LUMEN VASCULAR CATHETER Right 2022    Procedure:  INSERTION-CATHETER-Hemosplit;  Surgeon: Dionte Gan MD;  Location: [facility];  Service: General;  Laterality: Right;       Past Social History:  Social History     Socioeconomic History    Marital status:    Tobacco Use    Smoking status: Never    Smokeless tobacco: Never   Substance and Sexual Activity    Alcohol use: Not Currently    Drug use: No    Sexual activity: Not Currently     Partners: Male     Birth control/protection: I.U.D.     Social Determinants of Health     Financial Resource Strain: Low Risk  (5/16/2023)    Overall Financial Resource Strain (CARDIA)     Difficulty of Paying Living Expenses: Not very hard   Food Insecurity: No Food Insecurity (5/16/2023)    Hunger Vital Sign     Worried About Running Out of Food in the Last Year: Never true     Ran Out of Food in the Last Year: Never true   Transportation Needs: No Transportation Needs (5/16/2023)    PRAPARE - Transportation     Lack of Transportation (Medical): No     Lack of Transportation (Non-Medical): No   Physical Activity: Inactive (5/16/2023)    Exercise Vital Sign     Days of Exercise per Week: 0 days     Minutes of Exercise per Session: 0 min   Stress: No Stress Concern Present (5/16/2023)    [employer] [place] [employer] - Occupational Stress Questionnaire     Feeling of Stress : Not at all   Social Connections: Unknown (5/16/2023)    Social Connection and Isolation Panel [NHANES]     Frequency of Communication with Friends and Family: More than three times a week     Frequency of Social Gatherings with Friends and Family: More than three times a week     Attends Latter day Services: Never     Active Member of Clubs or Organizations: No     Attends Club or Organization Meetings: Never     Marital Status: Patient refused   Housing Stability: Low Risk  (5/16/2023)    Housing Stability Vital Sign     Unable to Pay for Housing in the Last Year: No     Number of Places Lived in the Last Year: 1     Unstable Housing in the  Last Year: No       Medications:  Scheduled Meds:   amLODIPine  5 mg Oral Daily    apixaban  5 mg Oral BID    carvediloL  25 mg Oral BID    FLUoxetine  20 mg Oral Daily    gabapentin  200 mg Oral BID    hydrALAZINE  100 mg Oral BID    insulin detemir U-100  18 Units Subcutaneous Daily    isosorbide mononitrate  30 mg Oral Daily    levETIRAcetam  500 mg Oral BID    pantoprazole  40 mg Oral Daily    sodium chloride 0.9%  10 mL Intravenous Q12H     Continuous Infusions:  PRN Meds:.acetaminophen, dextrose 50%, dextrose 50%, glucagon (human recombinant), glucose, glucose, hydrALAZINE, HYDROmorphone, insulin aspart U-100, melatonin, oxyCODONE-acetaminophen, promethazine (PHENERGAN) 12.5 mg in dextrose 5 % (D5W) 50 mL IVPB  No current facility-administered medications on file prior to encounter.     Current Outpatient Medications on File Prior to Encounter   Medication Sig Dispense Refill    amLODIPine (NORVASC) 5 MG tablet Take 1 tablet (5 mg total) by mouth once daily. 30 tablet 1    apixaban (ELIQUIS) 5 mg Tab Take 1 tablet (5 mg total) by mouth 2 (two) times daily. 60 tablet 2    carvediloL (COREG) 25 MG tablet Take 1 tablet (25 mg total) by mouth 2 (two) times daily. 60 tablet 5    FLUoxetine 20 MG capsule Take 1 capsule (20 mg total) by mouth once daily. 30 capsule 5    gabapentin (NEURONTIN) 300 MG capsule Take 2 capsules twice a day by oral route for 90 days. (Patient taking differently: Take 600 mg by mouth 2 (two) times daily.) 360 capsule 1    hydrALAZINE (APRESOLINE) 100 MG tablet Take 1 tablet (100 mg total) by mouth 2 (two) times a day. 60 tablet 2    HYDROcodone-acetaminophen (NORCO) 7.5-325 mg per tablet Take 1 tablet by mouth every 6 (six) hours as needed for Pain. 30 tablet 0    insulin aspart U-100 (NOVOLOG FLEXPEN U-100 INSULIN) 100 unit/mL (3 mL) InPn pen Inject 8 units 3 times a day by subcutaneous route before meals (Patient taking differently: Inject 8 Units into the skin 3 (three) times daily with  "meals.) 24 mL 1    insulin detemir U-100, Levemir, (LEVEMIR FLEXTOUCH U100 INSULIN) 100 unit/mL (3 mL) InPn pen Inject 18 units every day by subcutaneous route in the evening (Patient taking differently: Inject 18 Units into the skin once daily.) 18 mL 1    isosorbide mononitrate (IMDUR) 30 MG 24 hr tablet Take 1 tablet (30 mg total) by mouth once daily. 30 tablet 1    lancets 33 gauge Misc Use to test twice daily 100 each 5    levETIRAcetam (KEPPRA) 500 MG Tab Take 1 tablet twice a day by oral route for 30 days. 60 tablet 2    ondansetron (ZOFRAN-ODT) 4 MG TbDL Place 1 tablet (4 mg total) under the tongue every 8 (eight) hours. 24 tablet 2    pantoprazole (PROTONIX) 40 MG tablet Take 1 tablet every day by oral route for 30 days. (Patient taking differently: Take 40 mg by mouth once daily.) 30 tablet 2    pen needle, diabetic 31 gauge x 3/16" Ndle Use as directed to inject insulin 5 times daily 100 each 11    promethazine (PHENERGAN) 25 MG suppository Place 1 suppository (25 mg total) rectally every 6 (six) hours as needed for Nausea. 10 suppository 0    sucralfate (CARAFATE) 100 mg/mL suspension Take 10 mL 4 times a day by oral route before meals for 30 days. (Patient taking differently: Take 10 mLs by mouth 4 (four) times daily with meals and nightly.) 1260 mL 1    [DISCONTINUED] atenoloL (TENORMIN) 50 MG tablet Take 1 tablet (50 mg total) by mouth every other day. 45 tablet 3    [DISCONTINUED] omeprazole (PRILOSEC) 20 MG capsule Take 2 capsules (40 mg total) by mouth once daily. for 10 days 20 capsule 0       Allergies:  Penicillins    Past Family History:  Reviewed; refer to Hospitalist Admission Note    Review of Systems:  Review of Systems - All 14 systems reviewed and negative, except as noted in HPI    Physical Exam:  General Appearance:    NAD, AAO x 3, cooperative, appears stated age   Head:    Normocephalic, atraumatic   Eyes:    PER, EOMI, and conjunctiva/sclera clear bilaterally       Mouth:   Moist " mucus membranes, no thrush or oral lesions,       normal dentition   Back:     No CVA tenderness   Lungs:     Clear to auscultation bilaterally, no wheezes, crackles,           rales or rhonchi, symmetric air movement, respirations unlabored   Chest wall:    No tenderness or deformity   Heart:    Regular rate and rhythm, S1 and S2 normal, no murmur, rub   or gallop   Abdomen:     Soft, non-tender, non-distended, bowel sounds active all four   quadrants, no RT or guarding, no masses, no organomegaly   Extremities:   Warm and well perfused, distal pulses are intact, no             cyanosis or peripheral edema   MSK:   No joint or muscle swelling, tenderness or deformity   Skin:   Skin color, texture, turgor normal, no rashes or lesions   Neurologic/Psychiatric:   CNII-XII intact, normal strength and sensation       throughout, no asterixis; normal affect, memory, judgement     and insight      Results:  Lab Results   Component Value Date     (L) 08/18/2023    K 5.5 (H) 08/18/2023    CL 97 08/18/2023    CO2 26 08/18/2023    BUN 64 (H) 08/18/2023    CREATININE 6.4 (H) 08/18/2023    CALCIUM 8.4 (L) 08/18/2023    ANIONGAP 10 08/18/2023    ESTGFRAFRICA 20 (A) 07/31/2022    EGFRNONAA 18 (A) 07/31/2022       Lab Results   Component Value Date    CALCIUM 8.4 (L) 08/18/2023    PHOS 4.6 (H) 08/17/2023       Recent Labs   Lab 08/18/23  0412   WBC 5.41   RBC 3.00*   HGB 9.0*   HCT 26.6*   PLT 77*   MCV 89   MCH 30.0   MCHC 33.8       I have personally reviewed pertinent radiological imaging and reports.    I have spent > 70 minutes providing care for this patient for the above diagnoses. These services have included chart/data/imaging review, evaluation, exam, formulation of plan, , note preparation, and discussions with staff involved in this patient's care.    Prateek Clark MD MPH  [facility] [address] [address]  [phone] (p)  [phone] (f)

## 2023-08-18 NOTE — ASSESSMENT & PLAN NOTE
"Patient's FSGs are controlled on current medication regimen.  Last A1c reviewed-   Lab Results   Component Value Date    HGBA1C 5.8 08/01/2023     Most recent fingerstick glucose reviewed- No results for input(s): "POCTGLUCOSE" in the last 24 hours.  Current correctional scale  Low  Maintain    anti-hyperglycemic dose as follows-   Antihyperglycemics (From admission, onward)    Start     Stop Route Frequency Ordered    08/18/23 0900  insulin detemir U-100 (Levemir) pen 18 Units         -- SubQ Daily 08/18/23 0134    08/17/23 2246  insulin aspart U-100 pen 0-5 Units         -- SubQ Before meals & nightly PRN 08/17/23 2146        Hold Oral hypoglycemics while patient is in the hospital.  "

## 2023-08-18 NOTE — NURSING
Consent and Hep b status verified, removed 1000 uf net, no issues with catheter, changed dressing x 2 , patient rubbed off dressing, redirected . Patient stable throughout treatment. Educated patient on HD treatment. Patient screamed out most of treatment, asking for pain medication. shaila Helton aware. Report given to SHAILA Helton      08/18/23 1720   Handoff Report   Received From Eun   Given To Sunitha   Vital Signs   Temp 98.1 °F (36.7 °C)   Temp Source Oral   Pulse 78   Heart Rate Source Monitor   Resp 18   SpO2 96 %   Pulse Oximetry Type Intermittent   Device (Oxygen Therapy) nasal cannula   BP 98/60   BP Location Left arm   BP Method Automatic   Patient Position Lying   Assessments (Pre/Post)   Consent Obtained yes   Safety vein preservation armband present   Date Hepatitis Profile Obtained 02/07/23   Blood Liters Processed (BLP) 58.5   Transport Modality bed   Level of Consciousness (AVPU) alert   Dialyzer Clearance mildly streaked   Pain   Preferred Pain Scale number (Numeric Rating Pain Scale)   Comfort/Acceptable Pain Level 0   Pain Rating (0-10): Rest 0   Pain Rating (0-10): Activity 0   Pain Management Interventions quiet environment facilitated;position adjusted;pillow support provided   Pain/Comfort Interventions   Fever Reduction/Comfort Measures lightweight clothing;lightweight bedding   Pre-Hemodialysis Assessment   Additional Dialysis Information Needed Yes   Patient Status Departed   Treatment Consent Verified Yes   Treatment Status Completed        Hemodialysis Catheter 06/17/23 1135 right internal jugular   Placement Date/Time: 06/17/23 1135   Present Prior to Hospital Arrival?: No  Hand Hygiene: Performed  Barrier Precautions: Performed  Skin Antisepsis: ChloraPrep  Hemodialysis Catheter Type: Tunneled catheter  Location: right internal jugular  Cathete...   Line Necessity Review CRRT/HD   Site Assessment No drainage;No redness;No swelling;No warmth   Line Securement Device Secured with sutures    Dressing Type CHG impregnated dressing/sponge   Dressing Status Clean;Dry;Intact   Dressing Intervention Integrity maintained   Date on Dressing 08/18/23   Dressing Due to be Changed 08/24/23   Venous Patency/Care flushed w/o difficulty;heparin locked   Arterial Patency/Care flushed w/o difficulty;heparin locked   Post-Hemodialysis Assessment   Rinseback Volume (mL) 250 mL   Blood Volume Processed (Liters) 58.5 L   Dialyzer Clearance Lightly streaked   Duration of Treatment 180 minutes   Additional Fluid Intake (mL) 550 mL   Total UF (mL) 2500 mL   Net Fluid Removal 2000   Patient Response to Treatment lamar   Post-Treatment Weight 54.9 kg (121 lb 0.5 oz)   Treatment Weight Change -1   Post-Hemodialysis Comments stable

## 2023-08-18 NOTE — ASSESSMENT & PLAN NOTE
Long history of abdominal pain with 15 admit this year.  Lipase within normal limits.  CT abdomen pelvis with no acute findings.  Continue with pain control.  Zofran p.r.n. for nausea. PPI.

## 2023-08-18 NOTE — HOSPITAL COURSE
Tabby Howard is a 33 year old female with a past medical history of ESRD, diabetic gastroparesis with opiate dependence, DM, HTN, HLD, anemia, MDD/SIOBHAN, DVT, GERD, seizure disorder, and HFpEF who presented with diffuse abdominal pain secondary to the gastroparesis. She was placed on PRN analgesics and antiemetics. Nephrology has been consulted for HD both 8/18 and 8/19. She was able to tolerate PO during her course. She was discharged 8/19/2023.

## 2023-08-18 NOTE — SUBJECTIVE & OBJECTIVE
Past Medical History:   Diagnosis Date    ESRD on hemodialysis 2022    Gastritis 2022    EGD was 22    Gastroparesis 2022    has not had Emptying study    Heart failure with preserved ejection fraction 2022    EF 55% on 3/22    History of supraventricular tachycardia     Hyperkalemia 2022    Hypertensive emergency 2022    Sickle cell trait 2022    Type 2 diabetes mellitus        Past Surgical History:   Procedure Laterality Date     SECTION      x 3    COLONOSCOPY      COLONOSCOPY N/A 2022    Procedure: COLONOSCOPY;  Surgeon: Jagdeep Cedeno MD;  Location: [facility];  Service: Endoscopy;  Laterality: N/A;    ESOPHAGOGASTRODUODENOSCOPY N/A 10/18/2019    Procedure: ESOPHAGOGASTRODUODENOSCOPY (EGD);  Surgeon: Gianluca Mendez MD;  Location: [facility];  Service: Endoscopy;  Laterality: N/A;    ESOPHAGOGASTRODUODENOSCOPY N/A 2022    Procedure: EGD (ESOPHAGOGASTRODUODENOSCOPY);  Surgeon: Micky Paredes III, MD;  Location: [facility];  Service: Endoscopy;  Laterality: N/A;    ESOPHAGOGASTRODUODENOSCOPY N/A 2022    Procedure: EGD (ESOPHAGOGASTRODUODENOSCOPY);  Surgeon: Marcelo Zhong MD;  Location: [facility];  Service: Endoscopy;  Laterality: N/A;    INSERTION, CATHETER, TUNNELED N/A 2023    Procedure: Insertion,catheter,tunneled;  Surgeon: Carlos Thurman Jr., MD;  Location: [facility] OR;  Service: General;  Laterality: N/A;    LAPAROSCOPIC CHOLECYSTECTOMY N/A 2022    Procedure: CHOLECYSTECTOMY, LAPAROSCOPIC;  Surgeon: Grey Perez MD;  Location: [facility] OR;  Service: General;  Laterality: N/A;    PLACEMENT OF DUAL-LUMEN VASCULAR CATHETER Left 2022    Procedure: INSERTION-CATHETER-JOSEPH;  Surgeon: Dionte Gan MD;  Location: [facility] OR;  Service: General;  Laterality: Left;    PLACEMENT OF DUAL-LUMEN VASCULAR CATHETER Right 2022    Procedure: INSERTION-CATHETER-Hemosplit;  Surgeon: Dionte Gan MD;  Location: [facility];   Service: General;  Laterality: Right;       Review of patient's allergies indicates:   Allergen Reactions    Penicillins Hives       No current facility-administered medications on file prior to encounter.     Current Outpatient Medications on File Prior to Encounter   Medication Sig    amLODIPine (NORVASC) 5 MG tablet Take 1 tablet (5 mg total) by mouth once daily.    amLODIPine (NORVASC) 5 MG tablet Take 1 tablet (5 mg total) by mouth once daily. (Patient not taking: Reported on 8/17/2023)    apixaban (ELIQUIS) 5 mg Tab Take 1 tablet (5 mg total) by mouth 2 (two) times daily. (Patient not taking: Reported on 8/17/2023)    apixaban (ELIQUIS) 5 mg Tab Take 1 tablet (5 mg total) by mouth 2 (two) times daily. (Patient not taking: Reported on 8/17/2023)    apixaban (ELIQUIS) 5 mg Tab Take 1 tablet twice a day by oral route for 90 days. (Patient not taking: Reported on 8/17/2023)    apixaban (ELIQUIS) 5 mg Tab Take 1 tablet (5 mg total) by mouth 2 (two) times daily.    carvediloL (COREG) 25 MG tablet Take 1 tablet (25 mg total) by mouth 2 (two) times daily.    carvediloL (COREG) 25 MG tablet Take 1 tablet (25 mg total) by mouth 2 (two) times a day.    FLUoxetine 20 MG capsule Take 1 capsule (20 mg total) by mouth once daily.    FLUoxetine 20 MG capsule Take 1 capsule every day by oral route for 90 days.    FLUoxetine 20 MG capsule Take 1 capsule (20 mg total) by mouth once daily.    gabapentin (NEURONTIN) 300 MG capsule Take 2 capsules twice a day by oral route for 90 days.    gabapentin (NEURONTIN) 300 MG capsule Take 2 capsules (600 mg total) by mouth 2 (two) times daily.    hydrALAZINE (APRESOLINE) 100 MG tablet Take 1 tablet (100 mg total) by mouth every 8 (eight) hours.    hydrALAZINE (APRESOLINE) 100 MG tablet Take 1 tablet (100 mg total) by mouth 2 (two) times a day.    hydrALAZINE (APRESOLINE) 100 MG tablet Take 1 tablet twice a day by oral route for 90 days.    hydrALAZINE (APRESOLINE) 100 MG tablet Take 1  tablet (100 mg total) by mouth 2 (two) times daily.    HYDROcodone-acetaminophen (NORCO) 7.5-325 mg per tablet Take 1 tablet by mouth every 6 (six) hours as needed for Pain.    insulin aspart U-100 (NOVOLOG FLEXPEN U-100 INSULIN) 100 unit/mL (3 mL) InPn pen Inject 5 Units into the skin 3 (three) times daily before meals.    insulin aspart U-100 (NOVOLOG FLEXPEN U-100 INSULIN) 100 unit/mL (3 mL) InPn pen Inject 8 units 3 times a day by subcutaneous route before meals for 90 days.    insulin aspart U-100 (NOVOLOG FLEXPEN U-100 INSULIN) 100 unit/mL (3 mL) InPn pen Inject 8 units 3 times a day by subcutaneous route before meals    insulin aspart U-100 (NOVOLOG) 100 unit/mL (3 mL) InPn pen Inject 4 Units into the skin 3 (three) times daily with meals.    insulin detemir U-100, Levemir, (LEVEMIR FLEXPEN) 100 unit/mL (3 mL) InPn pen Inject 18 Units into the skin every evening.    insulin detemir U-100, Levemir, (LEVEMIR FLEXPEN) 100 unit/mL (3 mL) InPn pen Inject 18 units into the skin every evening    insulin detemir U-100, Levemir, (LEVEMIR FLEXTOUCH U100 INSULIN) 100 unit/mL (3 mL) InPn pen Inject 18 units every day by subcutaneous route in the evening    insulin detemir U-100, Levemir, 100 unit/mL (3 mL) SubQ InPn pen Inject 18 Units into the skin once daily.    isosorbide mononitrate (IMDUR) 30 MG 24 hr tablet Take 3 tablets (90 mg total) by mouth once daily.    isosorbide mononitrate (IMDUR) 30 MG 24 hr tablet Take 1 tablet (30 mg total) by mouth once daily.    isosorbide mononitrate (IMDUR) 30 MG 24 hr tablet Take 1 tablet every day by oral route for 90 days.    isosorbide mononitrate (IMDUR) 30 MG 24 hr tablet Take 1 tablet (30 mg total) by mouth once daily.    lancets 33 gauge Misc Use to test twice daily    levETIRAcetam (KEPPRA) 500 MG Tab Take 1 tablet twice a day by oral route for 30 days.    ondansetron (ZOFRAN) 4 MG tablet Take 1 tablet (4 mg total) by mouth every 6 (six) hours as needed for Nausea.     "ondansetron (ZOFRAN-ODT) 4 MG TbDL Place 1 tablet (4 mg) on or under the tongue every 8 hours by translingual route for 10 days.    ondansetron (ZOFRAN-ODT) 4 MG TbDL Place 1 tablet (4 mg total) under the tongue every 8 (eight) hours.    pantoprazole (PROTONIX) 40 MG tablet Take 1 tablet every day by oral route for 30 days.    pantoprazole (PROTONIX) 40 MG tablet Take 1 tablet every day by oral route for 30 days.    pantoprazole (PROTONIX) 40 MG tablet Take 1 tablet every day by oral route for 90 days.    pantoprazole (PROTONIX) 40 MG tablet Take 1 tablet every day by oral route    pen needle, diabetic 31 gauge x 3/16" Ndle Use as directed to inject insulin 5 times daily    promethazine (PHENERGAN) 25 MG suppository Place 1 suppository (25 mg total) rectally every 6 (six) hours as needed for Nausea.    promethazine (PHENERGAN) 25 MG suppository Place 1 suppository (25 mg total) rectally every 6 (six) hours as needed for Nausea.    sucralfate (CARAFATE) 100 mg/mL suspension Take 10 mLs (1,000 mg total) by mouth 4 (four) times daily.    sucralfate (CARAFATE) 100 mg/mL suspension Take 10 mL 4 times a day by oral route before meals for 30 days.    [DISCONTINUED] amLODIPine (NORVASC) 5 MG tablet Take 1 tablet every day by oral route for 90 days. (Patient taking differently: Take 5 mg by mouth once daily.)    [DISCONTINUED] atenoloL (TENORMIN) 50 MG tablet Take 1 tablet (50 mg total) by mouth every other day.    [DISCONTINUED] omeprazole (PRILOSEC) 20 MG capsule Take 2 capsules (40 mg total) by mouth once daily. for 10 days     Family History       Problem Relation (Age of Onset)    Diabetes Mother, Father          Tobacco Use    Smoking status: Never    Smokeless tobacco: Never   Substance and Sexual Activity    Alcohol use: Not Currently    Drug use: No    Sexual activity: Not Currently     Partners: Male     Birth control/protection: I.U.D.     Review of Systems   Constitutional: Negative.    HENT: Negative.     Eyes: " Negative.    Respiratory: Negative.     Cardiovascular: Negative.    Gastrointestinal:  Positive for abdominal pain, nausea and vomiting. Negative for abdominal distention, constipation and diarrhea.   Endocrine: Negative.    Genitourinary: Negative.    Musculoskeletal: Negative.    Skin: Negative.    Neurological: Negative.    Hematological: Negative.    Psychiatric/Behavioral: Negative.       Objective:     Vital Signs (Most Recent):  Temp: 98 °F (36.7 °C) (08/17/23 1152)  Pulse: 78 (08/17/23 2132)  Resp: 17 (08/17/23 1539)  BP: (!) 186/109 (08/17/23 2132)  SpO2: 97 % (08/17/23 2012) Vital Signs (24h Range):  Temp:  [98 °F (36.7 °C)] 98 °F (36.7 °C)  Pulse:  [76-88] 78  Resp:  [16-20] 17  SpO2:  [96 %-97 %] 97 %  BP: (115-186)/() 186/109     Weight: 52.6 kg (116 lb)  Body mass index is 21.22 kg/m².     Physical Exam  Vitals and nursing note reviewed.   Constitutional:       Appearance: Normal appearance.      Comments: In mild distress.  Actively complaining of abdominal pain.   HENT:      Head: Normocephalic and atraumatic.      Nose: Nose normal.   Eyes:      Extraocular Movements: Extraocular movements intact.      Conjunctiva/sclera: Conjunctivae normal.      Pupils: Pupils are equal, round, and reactive to light.   Cardiovascular:      Rate and Rhythm: Normal rate and regular rhythm.   Pulmonary:      Effort: Pulmonary effort is normal. No respiratory distress.      Breath sounds: Normal breath sounds. No stridor. No wheezing or rales.   Abdominal:      General: Bowel sounds are normal. There is no distension.      Palpations: Abdomen is soft.      Tenderness: There is abdominal tenderness.   Musculoskeletal:         General: No swelling or tenderness. Normal range of motion.      Cervical back: Normal range of motion and neck supple.      Right lower leg: No edema.      Left lower leg: No edema.   Skin:     General: Skin is warm and dry.   Neurological:      General: No focal deficit present.       Mental Status: She is alert and oriented to person, place, and time.   Psychiatric:         Mood and Affect: Mood normal.         Behavior: Behavior normal.         Thought Content: Thought content normal.         Judgment: Judgment normal.              CRANIAL NERVES     CN III, IV, VI   Pupils are equal, round, and reactive to light.       Significant Labs: All pertinent labs within the past 24 hours have been reviewed.  Recent Lab Results         08/17/23  1542   08/17/23  1215        Albumin   3.8       Alkaline Phosphatase   145       ALT   21       Anion Gap   12       AST   25       Baso #   0.02       Basophil %   0.4       BILIRUBIN TOTAL   1.7  Comment: For infants and newborns, interpretation of results should be based  on gestational age, weight and in agreement with clinical  observations.    Premature Infant recommended reference ranges:  Up to 24 hours.............<8.0 mg/dL  Up to 48 hours............<12.0 mg/dL  3-5 days..................<15.0 mg/dL  6-29 days.................<15.0 mg/dL         BUN   48       Calcium   8.9       Chloride   95       CO2   27       Creatinine   5.5       Differential Method   Automated       eGFR   9.8       Eos #   0.1       Eosinophil %   1.7       Glucose   325       Gran # (ANC)   3.7       Gran %   77.5       Group & Rh A POS         HCG Quant   1.8  Comment: Quantitative HCG Reference Ranges:  Males........................<5.0 mIU/mL  Non-Pregnant Females.........<5.0 mIU/mL  Pregnancy:  Weeks post-LMP...............Range (mIU/mL)  1-10  weeks.....................202-231,000  11-15 weeks..................22,536-234,990  16-22 weeks...................8,007-50,064  23-40 weeks...................1,560-58,072    NOTE:  This assay is not FDA approved for tumor screening,   diagnosis, or monitoring.         Hematocrit   24.8       Hemoglobin   8.7       Immature Grans (Abs)   0.02  Comment: Mild elevation in immature granulocytes is non specific and   can be seen in  a variety of conditions including stress response,   acute inflammation, trauma and pregnancy. Correlation with other   laboratory and clinical findings is essential.         Immature Granulocytes   0.4       INDIRECT ADRIAN NEG         Lipase   33       Lymph #   0.7       Lymph %   13.7       Magnesium   2.1       MCH   30.2       MCHC   35.1       MCV   86       Mono #   0.3       Mono %   6.3       MPV   10.5       nRBC   0       Phosphorus   4.6       Platelets   63       Potassium   4.3       PROTEIN TOTAL   7.4       RBC   2.88       RDW   16.7       Sodium   134       Specimen Outdate 08/20/2023 23:59         WBC   4.76               Significant Imaging: I have reviewed all pertinent imaging results/findings within the past 24 hours.

## 2023-08-18 NOTE — H&P
[facility] - Emergency Dept  Hospital Medicine  History & Physical    Patient Name: Tabby Howard  MRN: [id number]  Patient Class: OP- Observation  Admission Date: 8/17/2023  Attending Physician: Gianluca Lopez MD   Primary Care Provider: Melony Kim NP         Patient information was obtained from patient, past medical records and ER records.     Subjective:     Principal Problem:Generalized abdominal pain    Chief Complaint:   Chief Complaint   Patient presents with    Abdominal Pain     ABD pain started today, nausea , pt was at dialysis but unable to get treatment due to ABD pain    Back Pain     Thoracic back pain        HPI: Patient is a 33-year-old female with ESRD on hemodialysis(T-Th-S),  gastroparesis, seizure, prior C diff infection, DMII, poor vision, sickle cell trait who presents with abdominal pain.  Abdominal pain is generalized and 10/10.  Pain radiates to her back.  Pain began about 2 hours prior to ED arrival.  Patient says she was unable to attend HD due to the pain.  Last HD was on Tuesday.  Has had associated nausea or vomiting.  No diarrhea.  No fevers.  Denies chest pain or shortness for breath.  Patient is afebrile and hemodynamically stable.  Satting well on room air. WBC 4.7 and hemoglobin 8.7.  Sodium 134, potassium 4.3, bicarb 27, glucose 325, BUN 48, creatinine 5.5.  Lipase within normal limits.  CT abdomen and pelvis with no acute findings.     Patient has been admitted to the hospital 15 times this year.      Past Medical History:   Diagnosis Date    ESRD on hemodialysis 04/12/2022    Gastritis 12/2022    EGD was 12/5/22    Gastroparesis 04/12/2022    has not had Emptying study    Heart failure with preserved ejection fraction 04/12/2022    EF 55% on 3/22    History of supraventricular tachycardia     Hyperkalemia 07/07/2022    Hypertensive emergency 07/08/2022    Sickle cell trait 04/12/2022    Type 2 diabetes mellitus        Past Surgical History:    Procedure Laterality Date     SECTION      x 3    COLONOSCOPY      COLONOSCOPY N/A 2022    Procedure: COLONOSCOPY;  Surgeon: Jagdeep Cedeno MD;  Location: [facility] ENDO;  Service: Endoscopy;  Laterality: N/A;    ESOPHAGOGASTRODUODENOSCOPY N/A 10/18/2019    Procedure: ESOPHAGOGASTRODUODENOSCOPY (EGD);  Surgeon: Gianluca Mendez MD;  Location: [facility] ENDO;  Service: Endoscopy;  Laterality: N/A;    ESOPHAGOGASTRODUODENOSCOPY N/A 2022    Procedure: EGD (ESOPHAGOGASTRODUODENOSCOPY);  Surgeon: Micky Paredes III, MD;  Location: [facility] ENDO;  Service: Endoscopy;  Laterality: N/A;    ESOPHAGOGASTRODUODENOSCOPY N/A 2022    Procedure: EGD (ESOPHAGOGASTRODUODENOSCOPY);  Surgeon: Marcelo Zhong MD;  Location: [facility] ENDO;  Service: Endoscopy;  Laterality: N/A;    INSERTION, CATHETER, TUNNELED N/A 2023    Procedure: Insertion,catheter,tunneled;  Surgeon: Carlos Thurman Jr., MD;  Location: [facility] OR;  Service: General;  Laterality: N/A;    LAPAROSCOPIC CHOLECYSTECTOMY N/A 2022    Procedure: CHOLECYSTECTOMY, LAPAROSCOPIC;  Surgeon: Grey Perez MD;  Location: [facility] OR;  Service: General;  Laterality: N/A;    PLACEMENT OF DUAL-LUMEN VASCULAR CATHETER Left 2022    Procedure: INSERTION-CATHETER-JOSEPH;  Surgeon: Dionte Gna MD;  Location: [facility] OR;  Service: General;  Laterality: Left;    PLACEMENT OF DUAL-LUMEN VASCULAR CATHETER Right 2022    Procedure: INSERTION-CATHETER-Hemosplit;  Surgeon: Dionte Gan MD;  Location: [facility] OR;  Service: General;  Laterality: Right;       Review of patient's allergies indicates:   Allergen Reactions    Penicillins Hives       No current facility-administered medications on file prior to encounter.     Current Outpatient Medications on File Prior to Encounter   Medication Sig    amLODIPine (NORVASC) 5 MG tablet Take 1 tablet (5 mg total) by mouth once daily.    amLODIPine (NORVASC) 5 MG tablet Take 1 tablet (5 mg total) by  mouth once daily. (Patient not taking: Reported on 8/17/2023)    apixaban (ELIQUIS) 5 mg Tab Take 1 tablet (5 mg total) by mouth 2 (two) times daily. (Patient not taking: Reported on 8/17/2023)    apixaban (ELIQUIS) 5 mg Tab Take 1 tablet (5 mg total) by mouth 2 (two) times daily. (Patient not taking: Reported on 8/17/2023)    apixaban (ELIQUIS) 5 mg Tab Take 1 tablet twice a day by oral route for 90 days. (Patient not taking: Reported on 8/17/2023)    apixaban (ELIQUIS) 5 mg Tab Take 1 tablet (5 mg total) by mouth 2 (two) times daily.    carvediloL (COREG) 25 MG tablet Take 1 tablet (25 mg total) by mouth 2 (two) times daily.    carvediloL (COREG) 25 MG tablet Take 1 tablet (25 mg total) by mouth 2 (two) times a day.    FLUoxetine 20 MG capsule Take 1 capsule (20 mg total) by mouth once daily.    FLUoxetine 20 MG capsule Take 1 capsule every day by oral route for 90 days.    FLUoxetine 20 MG capsule Take 1 capsule (20 mg total) by mouth once daily.    gabapentin (NEURONTIN) 300 MG capsule Take 2 capsules twice a day by oral route for 90 days.    gabapentin (NEURONTIN) 300 MG capsule Take 2 capsules (600 mg total) by mouth 2 (two) times daily.    hydrALAZINE (APRESOLINE) 100 MG tablet Take 1 tablet (100 mg total) by mouth every 8 (eight) hours.    hydrALAZINE (APRESOLINE) 100 MG tablet Take 1 tablet (100 mg total) by mouth 2 (two) times a day.    hydrALAZINE (APRESOLINE) 100 MG tablet Take 1 tablet twice a day by oral route for 90 days.    hydrALAZINE (APRESOLINE) 100 MG tablet Take 1 tablet (100 mg total) by mouth 2 (two) times daily.    HYDROcodone-acetaminophen (NORCO) 7.5-325 mg per tablet Take 1 tablet by mouth every 6 (six) hours as needed for Pain.    insulin aspart U-100 (NOVOLOG FLEXPEN U-100 INSULIN) 100 unit/mL (3 mL) InPn pen Inject 5 Units into the skin 3 (three) times daily before meals.    insulin aspart U-100 (NOVOLOG FLEXPEN U-100 INSULIN) 100 unit/mL (3 mL) InPn pen Inject 8 units  3 times a day by subcutaneous route before meals for 90 days.    insulin aspart U-100 (NOVOLOG FLEXPEN U-100 INSULIN) 100 unit/mL (3 mL) InPn pen Inject 8 units 3 times a day by subcutaneous route before meals    insulin aspart U-100 (NOVOLOG) 100 unit/mL (3 mL) InPn pen Inject 4 Units into the skin 3 (three) times daily with meals.    insulin detemir U-100, Levemir, (LEVEMIR FLEXPEN) 100 unit/mL (3 mL) InPn pen Inject 18 Units into the skin every evening.    insulin detemir U-100, Levemir, (LEVEMIR FLEXPEN) 100 unit/mL (3 mL) InPn pen Inject 18 units into the skin every evening    insulin detemir U-100, Levemir, (LEVEMIR FLEXTOUCH U100 INSULIN) 100 unit/mL (3 mL) InPn pen Inject 18 units every day by subcutaneous route in the evening    insulin detemir U-100, Levemir, 100 unit/mL (3 mL) SubQ InPn pen Inject 18 Units into the skin once daily.    isosorbide mononitrate (IMDUR) 30 MG 24 hr tablet Take 3 tablets (90 mg total) by mouth once daily.    isosorbide mononitrate (IMDUR) 30 MG 24 hr tablet Take 1 tablet (30 mg total) by mouth once daily.    isosorbide mononitrate (IMDUR) 30 MG 24 hr tablet Take 1 tablet every day by oral route for 90 days.    isosorbide mononitrate (IMDUR) 30 MG 24 hr tablet Take 1 tablet (30 mg total) by mouth once daily.    lancets 33 gauge Misc Use to test twice daily    levETIRAcetam (KEPPRA) 500 MG Tab Take 1 tablet twice a day by oral route for 30 days.    ondansetron (ZOFRAN) 4 MG tablet Take 1 tablet (4 mg total) by mouth every 6 (six) hours as needed for Nausea.    ondansetron (ZOFRAN-ODT) 4 MG TbDL Place 1 tablet (4 mg) on or under the tongue every 8 hours by translingual route for 10 days.    ondansetron (ZOFRAN-ODT) 4 MG TbDL Place 1 tablet (4 mg total) under the tongue every 8 (eight) hours.    pantoprazole (PROTONIX) 40 MG tablet Take 1 tablet every day by oral route for 30 days.    pantoprazole (PROTONIX) 40 MG tablet Take 1 tablet every day by oral route for 30  "days.    pantoprazole (PROTONIX) 40 MG tablet Take 1 tablet every day by oral route for 90 days.    pantoprazole (PROTONIX) 40 MG tablet Take 1 tablet every day by oral route    pen needle, diabetic 31 gauge x 3/16" Ndle Use as directed to inject insulin 5 times daily    promethazine (PHENERGAN) 25 MG suppository Place 1 suppository (25 mg total) rectally every 6 (six) hours as needed for Nausea.    promethazine (PHENERGAN) 25 MG suppository Place 1 suppository (25 mg total) rectally every 6 (six) hours as needed for Nausea.    sucralfate (CARAFATE) 100 mg/mL suspension Take 10 mLs (1,000 mg total) by mouth 4 (four) times daily.    sucralfate (CARAFATE) 100 mg/mL suspension Take 10 mL 4 times a day by oral route before meals for 30 days.    [DISCONTINUED] amLODIPine (NORVASC) 5 MG tablet Take 1 tablet every day by oral route for 90 days. (Patient taking differently: Take 5 mg by mouth once daily.)    [DISCONTINUED] atenoloL (TENORMIN) 50 MG tablet Take 1 tablet (50 mg total) by mouth every other day.    [DISCONTINUED] omeprazole (PRILOSEC) 20 MG capsule Take 2 capsules (40 mg total) by mouth once daily. for 10 days     Family History       Problem Relation (Age of Onset)    Diabetes Mother, Father          Tobacco Use    Smoking status: Never    Smokeless tobacco: Never   Substance and Sexual Activity    Alcohol use: Not Currently    Drug use: No    Sexual activity: Not Currently     Partners: Male     Birth control/protection: I.U.D.     Review of Systems   Constitutional: Negative.    HENT: Negative.     Eyes: Negative.    Respiratory: Negative.     Cardiovascular: Negative.    Gastrointestinal:  Positive for abdominal pain, nausea and vomiting. Negative for abdominal distention, constipation and diarrhea.   Endocrine: Negative.    Genitourinary: Negative.    Musculoskeletal: Negative.    Skin: Negative.    Neurological: Negative.    Hematological: Negative.    Psychiatric/Behavioral: Negative.   "     Objective:     Vital Signs (Most Recent):  Temp: 98 °F (36.7 °C) (08/17/23 1152)  Pulse: 78 (08/17/23 2132)  Resp: 17 (08/17/23 1539)  BP: (!) 186/109 (08/17/23 2132)  SpO2: 97 % (08/17/23 2012) Vital Signs (24h Range):  Temp:  [98 °F (36.7 °C)] 98 °F (36.7 °C)  Pulse:  [76-88] 78  Resp:  [16-20] 17  SpO2:  [96 %-97 %] 97 %  BP: (115-186)/() 186/109     Weight: 52.6 kg (116 lb)  Body mass index is 21.22 kg/m².     Physical Exam  Vitals and nursing note reviewed.   Constitutional:       Appearance: Normal appearance.      Comments: In mild distress.  Actively complaining of abdominal pain.   HENT:      Head: Normocephalic and atraumatic.      Nose: Nose normal.   Eyes:      Extraocular Movements: Extraocular movements intact.      Conjunctiva/sclera: Conjunctivae normal.      Pupils: Pupils are equal, round, and reactive to light.   Cardiovascular:      Rate and Rhythm: Normal rate and regular rhythm.   Pulmonary:      Effort: Pulmonary effort is normal. No respiratory distress.      Breath sounds: Normal breath sounds. No stridor. No wheezing or rales.   Abdominal:      General: Bowel sounds are normal. There is no distension.      Palpations: Abdomen is soft.      Tenderness: There is abdominal tenderness.   Musculoskeletal:         General: No swelling or tenderness. Normal range of motion.      Cervical back: Normal range of motion and neck supple.      Right lower leg: No edema.      Left lower leg: No edema.   Skin:     General: Skin is warm and dry.   Neurological:      General: No focal deficit present.      Mental Status: She is alert and oriented to person, place, and time.   Psychiatric:         Mood and Affect: Mood normal.         Behavior: Behavior normal.         Thought Content: Thought content normal.         Judgment: Judgment normal.              CRANIAL NERVES     CN III, IV, VI   Pupils are equal, round, and reactive to light.       Significant Labs: All pertinent labs within the past  24 hours have been reviewed.  Recent Lab Results         08/17/23  1542   08/17/23  1215        Albumin   3.8       Alkaline Phosphatase   145       ALT   21       Anion Gap   12       AST   25       Baso #   0.02       Basophil %   0.4       BILIRUBIN TOTAL   1.7  Comment: For infants and newborns, interpretation of results should be based  on gestational age, weight and in agreement with clinical  observations.    Premature Infant recommended reference ranges:  Up to 24 hours.............<8.0 mg/dL  Up to 48 hours............<12.0 mg/dL  3-5 days..................<15.0 mg/dL  6-29 days.................<15.0 mg/dL         BUN   48       Calcium   8.9       Chloride   95       CO2   27       Creatinine   5.5       Differential Method   Automated       eGFR   9.8       Eos #   0.1       Eosinophil %   1.7       Glucose   325       Gran # (ANC)   3.7       Gran %   77.5       Group & Rh A POS         HCG Quant   1.8  Comment: Quantitative HCG Reference Ranges:  Males........................<5.0 mIU/mL  Non-Pregnant Females.........<5.0 mIU/mL  Pregnancy:  Weeks post-LMP...............Range (mIU/mL)  1-10  weeks.....................202-231,000  11-15 weeks..................22,536-234,990  16-22 weeks...................8,007-50,064  23-40 weeks...................1,600-49,413    NOTE:  This assay is not FDA approved for tumor screening,   diagnosis, or monitoring.         Hematocrit   24.8       Hemoglobin   8.7       Immature Grans (Abs)   0.02  Comment: Mild elevation in immature granulocytes is non specific and   can be seen in a variety of conditions including stress response,   acute inflammation, trauma and pregnancy. Correlation with other   laboratory and clinical findings is essential.         Immature Granulocytes   0.4       INDIRECT ADRIAN NEG         Lipase   33       Lymph #   0.7       Lymph %   13.7       Magnesium   2.1       MCH   30.2       MCHC   35.1       MCV   86       Mono #   0.3       Mono %    "6.3       MPV   10.5       nRBC   0       Phosphorus   4.6       Platelets   63       Potassium   4.3       PROTEIN TOTAL   7.4       RBC   2.88       RDW   16.7       Sodium   134       Specimen Outdate 08/20/2023 23:59         WBC   4.76               Significant Imaging: I have reviewed all pertinent imaging results/findings within the past 24 hours.    Assessment/Plan:     * Generalized abdominal pain  Long history of abdominal pain with 15 admit this year.  Lipase within normal limits.  CT abdomen pelvis with no acute findings.  Continue with pain control.  Zofran p.r.n. for nausea. PPI.       ESRD (end stage renal disease)   ESRD on HD Tuesday Thursday Saturday   Nephrology consulted for HD   BMP daily      Hypertension  Continue home medications      Frequent hospital admissions  Noted.  Has been admitted 15 times this year.      Type 2 diabetes mellitus with hyperglycemia, with long-term current use of insulin, previous HbA1c 5.8%  Patient's FSGs are controlled on current medication regimen.  Last A1c reviewed-   Lab Results   Component Value Date    HGBA1C 5.8 08/01/2023     Most recent fingerstick glucose reviewed- No results for input(s): "POCTGLUCOSE" in the last 24 hours.  Current correctional scale  Low  Maintain    anti-hyperglycemic dose as follows-   Antihyperglycemics (From admission, onward)    Start     Stop Route Frequency Ordered    08/17/23 2246  insulin aspart U-100 pen 0-5 Units         -- SubQ Before meals & nightly PRN 08/17/23 2146        Hold Oral hypoglycemics while patient is in the hospital.    Deep vein thrombosis (DVT) of non-extremity vein  Continue Eliquis      VTE Risk Mitigation (From admission, onward)         Ordered     apixaban tablet 5 mg  2 times daily         08/17/23 2146     IP VTE HIGH RISK PATIENT  Once         08/17/23 2143     Place sequential compression device  Until discontinued         08/17/23 2143                   On 08/17/2023, patient should be placed in " hospital observation services under my care.        Gianluca Lopez MD  Department of Hospital Medicine  [facility] - Emergency Dept

## 2023-08-18 NOTE — HPI
Patient is a 33-year-old female with ESRD on hemodialysis(T-Th-S),  gastroparesis, seizure, prior C diff infection, DMII, poor vision, sickle cell trait who presents with abdominal pain.  Abdominal pain is generalized and 10/10.  Pain radiates to her back.  Pain began about 2 hours prior to ED arrival.  Patient says she was unable to attend HD due to the pain.  Last HD was on Tuesday.  Has had associated nausea or vomiting.  No diarrhea.  No fevers.  Denies chest pain or shortness for breath.  Patient is afebrile and hemodynamically stable.  Satting well on room air. WBC 4.7 and hemoglobin 8.7.  Sodium 134, potassium 4.3, bicarb 27, glucose 325, BUN 48, creatinine 5.5.  Lipase within normal limits.  CT abdomen and pelvis with no acute findings.     Patient has been admitted to the hospital 15 times this year.

## 2023-08-18 NOTE — ASSESSMENT & PLAN NOTE
"Patient's FSGs are controlled on current medication regimen.  Last A1c reviewed-   Lab Results   Component Value Date    HGBA1C 5.8 08/01/2023     Most recent fingerstick glucose reviewed- No results for input(s): "POCTGLUCOSE" in the last 24 hours.  Current correctional scale  Low  Maintain    anti-hyperglycemic dose as follows-   Antihyperglycemics (From admission, onward)    Start     Stop Route Frequency Ordered    08/17/23 2246  insulin aspart U-100 pen 0-5 Units         -- SubQ Before meals & nightly PRN 08/17/23 2146        Hold Oral hypoglycemics while patient is in the hospital.  "

## 2023-08-19 VITALS
HEIGHT: 62 IN | OXYGEN SATURATION: 95 % | DIASTOLIC BLOOD PRESSURE: 81 MMHG | WEIGHT: 123.19 LBS | BODY MASS INDEX: 22.67 KG/M2 | RESPIRATION RATE: 17 BRPM | SYSTOLIC BLOOD PRESSURE: 156 MMHG | TEMPERATURE: 98 F | HEART RATE: 80 BPM

## 2023-08-19 PROBLEM — D63.1 ANEMIA OF CHRONIC KIDNEY FAILURE, STAGE 5: Chronic | Status: RESOLVED | Noted: 2022-10-31 | Resolved: 2023-08-19

## 2023-08-19 PROBLEM — E87.1 HYPONATREMIA: Status: RESOLVED | Noted: 2023-04-22 | Resolved: 2023-08-19

## 2023-08-19 PROBLEM — R10.84 GENERALIZED ABDOMINAL PAIN: Status: RESOLVED | Noted: 2018-09-15 | Resolved: 2023-08-19

## 2023-08-19 PROBLEM — N25.81 SECONDARY HYPERPARATHYROIDISM: Status: ACTIVE | Noted: 2021-04-24

## 2023-08-19 PROBLEM — N18.5 ANEMIA OF CHRONIC KIDNEY FAILURE, STAGE 5: Chronic | Status: RESOLVED | Noted: 2022-10-31 | Resolved: 2023-08-19

## 2023-08-19 LAB
ANION GAP SERPL CALC-SCNC: 11 MMOL/L (ref 8–16)
BUN SERPL-MCNC: 28 MG/DL (ref 6–20)
CALCIUM SERPL-MCNC: 8.9 MG/DL (ref 8.7–10.5)
CHLORIDE SERPL-SCNC: 106 MMOL/L (ref 95–110)
CO2 SERPL-SCNC: 20 MMOL/L (ref 23–29)
CREAT SERPL-MCNC: 4.4 MG/DL (ref 0.5–1.4)
ERYTHROCYTE [DISTWIDTH] IN BLOOD BY AUTOMATED COUNT: 18.1 % (ref 11.5–14.5)
EST. GFR  (NO RACE VARIABLE): 12.8 ML/MIN/1.73 M^2
GLUCOSE SERPL-MCNC: 151 MG/DL (ref 70–110)
GLUCOSE SERPL-MCNC: 171 MG/DL (ref 70–110)
GLUCOSE SERPL-MCNC: 201 MG/DL (ref 70–110)
HCT VFR BLD AUTO: 22.6 % (ref 37–48.5)
HGB BLD-MCNC: 7.3 G/DL (ref 12–16)
MAGNESIUM SERPL-MCNC: 2.1 MG/DL (ref 1.6–2.6)
MCH RBC QN AUTO: 29.9 PG (ref 27–31)
MCHC RBC AUTO-ENTMCNC: 32.3 G/DL (ref 32–36)
MCV RBC AUTO: 93 FL (ref 82–98)
PHOSPHATE SERPL-MCNC: 4.4 MG/DL (ref 2.7–4.5)
PLATELET # BLD AUTO: 91 K/UL (ref 150–450)
PMV BLD AUTO: 10.6 FL (ref 9.2–12.9)
POTASSIUM SERPL-SCNC: 4.5 MMOL/L (ref 3.5–5.1)
RBC # BLD AUTO: 2.44 M/UL (ref 4–5.4)
SODIUM SERPL-SCNC: 137 MMOL/L (ref 136–145)
WBC # BLD AUTO: 7.32 K/UL (ref 3.9–12.7)

## 2023-08-19 PROCEDURE — 99900035 HC TECH TIME PER 15 MIN (STAT)

## 2023-08-19 PROCEDURE — 63600175 PHARM REV CODE 636 W HCPCS: Mod: JZ | Performed by: INTERNAL MEDICINE

## 2023-08-19 PROCEDURE — 63600175 PHARM REV CODE 636 W HCPCS: Performed by: STUDENT IN AN ORGANIZED HEALTH CARE EDUCATION/TRAINING PROGRAM

## 2023-08-19 PROCEDURE — 80048 BASIC METABOLIC PNL TOTAL CA: CPT | Performed by: STUDENT IN AN ORGANIZED HEALTH CARE EDUCATION/TRAINING PROGRAM

## 2023-08-19 PROCEDURE — 94761 N-INVAS EAR/PLS OXIMETRY MLT: CPT

## 2023-08-19 PROCEDURE — 36415 COLL VENOUS BLD VENIPUNCTURE: CPT | Performed by: STUDENT IN AN ORGANIZED HEALTH CARE EDUCATION/TRAINING PROGRAM

## 2023-08-19 PROCEDURE — 96376 TX/PRO/DX INJ SAME DRUG ADON: CPT

## 2023-08-19 PROCEDURE — 83735 ASSAY OF MAGNESIUM: CPT | Performed by: STUDENT IN AN ORGANIZED HEALTH CARE EDUCATION/TRAINING PROGRAM

## 2023-08-19 PROCEDURE — 96372 THER/PROPH/DIAG INJ SC/IM: CPT | Performed by: INTERNAL MEDICINE

## 2023-08-19 PROCEDURE — G0378 HOSPITAL OBSERVATION PER HR: HCPCS

## 2023-08-19 PROCEDURE — 82962 GLUCOSE BLOOD TEST: CPT

## 2023-08-19 PROCEDURE — 90935 HEMODIALYSIS ONE EVALUATION: CPT

## 2023-08-19 PROCEDURE — 84100 ASSAY OF PHOSPHORUS: CPT | Performed by: STUDENT IN AN ORGANIZED HEALTH CARE EDUCATION/TRAINING PROGRAM

## 2023-08-19 PROCEDURE — 63600175 PHARM REV CODE 636 W HCPCS: Performed by: INTERNAL MEDICINE

## 2023-08-19 PROCEDURE — 25000003 PHARM REV CODE 250: Performed by: STUDENT IN AN ORGANIZED HEALTH CARE EDUCATION/TRAINING PROGRAM

## 2023-08-19 PROCEDURE — 85027 COMPLETE CBC AUTOMATED: CPT | Performed by: STUDENT IN AN ORGANIZED HEALTH CARE EDUCATION/TRAINING PROGRAM

## 2023-08-19 PROCEDURE — 27000221 HC OXYGEN, UP TO 24 HOURS

## 2023-08-19 PROCEDURE — A4216 STERILE WATER/SALINE, 10 ML: HCPCS | Performed by: STUDENT IN AN ORGANIZED HEALTH CARE EDUCATION/TRAINING PROGRAM

## 2023-08-19 RX ORDER — ONDANSETRON 4 MG/1
4 TABLET, ORALLY DISINTEGRATING ORAL EVERY 8 HOURS PRN
Qty: 24 TABLET | Refills: 2
Start: 2023-08-19 | End: 2023-10-03 | Stop reason: SDUPTHER

## 2023-08-19 RX ADMIN — ERYTHROPOIETIN 5600 UNITS: 10000 INJECTION, SOLUTION INTRAVENOUS; SUBCUTANEOUS at 04:08

## 2023-08-19 RX ADMIN — PANTOPRAZOLE SODIUM 40 MG: 40 TABLET, DELAYED RELEASE ORAL at 06:08

## 2023-08-19 RX ADMIN — HYDROMORPHONE HYDROCHLORIDE 1 MG: 1 INJECTION, SOLUTION INTRAMUSCULAR; INTRAVENOUS; SUBCUTANEOUS at 09:08

## 2023-08-19 RX ADMIN — HEPARIN SODIUM 4000 UNITS: 1000 INJECTION, SOLUTION INTRAVENOUS; SUBCUTANEOUS at 04:08

## 2023-08-19 RX ADMIN — APIXABAN 5 MG: 5 TABLET, FILM COATED ORAL at 09:08

## 2023-08-19 RX ADMIN — LEVETIRACETAM 500 MG: 500 TABLET, FILM COATED ORAL at 09:08

## 2023-08-19 RX ADMIN — ISOSORBIDE MONONITRATE 30 MG: 30 TABLET, EXTENDED RELEASE ORAL at 09:08

## 2023-08-19 RX ADMIN — SODIUM CHLORIDE 10 ML: 9 INJECTION INTRAMUSCULAR; INTRAVENOUS; SUBCUTANEOUS at 09:08

## 2023-08-19 RX ADMIN — OXYCODONE AND ACETAMINOPHEN 1 TABLET: 325; 5 TABLET ORAL at 06:08

## 2023-08-19 RX ADMIN — HYDRALAZINE HYDROCHLORIDE 100 MG: 25 TABLET ORAL at 09:08

## 2023-08-19 RX ADMIN — GABAPENTIN 200 MG: 100 CAPSULE ORAL at 09:08

## 2023-08-19 RX ADMIN — CARVEDILOL 25 MG: 12.5 TABLET, FILM COATED ORAL at 09:08

## 2023-08-19 RX ADMIN — FLUOXETINE 20 MG: 20 CAPSULE ORAL at 09:08

## 2023-08-19 RX ADMIN — INSULIN DETEMIR 18 UNITS: 100 INJECTION, SOLUTION SUBCUTANEOUS at 09:08

## 2023-08-19 RX ADMIN — AMLODIPINE BESYLATE 5 MG: 5 TABLET ORAL at 09:08

## 2023-08-19 NOTE — CONSULTS
INPATIENT NEPHROLOGY Progress Note   [facility]    Patient Name: Tabby Howard  Date: 08/19/2023    Reason for consultation: ESRD    Chief Complaint:   Chief Complaint   Patient presents with    Abdominal Pain     ABD pain started today, nausea , pt was at dialysis but unable to get treatment due to ABD pain    Back Pain     Thoracic back pain       History of Present Illness:  Patient is a 33-year-old female with ESRD on hemodialysis(T-Th-S),  gastroparesis, seizure, prior C diff infection, DMII, poor vision, sickle cell trait who presents with abdominal pain.  Abdominal pain is generalized and 10/10.  Pain radiates to her back.  Pain began about 2 hours prior to ED arrival.  Patient says she was unable to complete HD due to the pain- did about 15 min.  Last HD was on Tuesday.  Has had associated nausea or vomiting.  No diarrhea.  No fevers.  Denies chest pain or shortness for breath. Consulted for dialysis.    Interval History:  8/18- HD today and then TTS, CT AP neg- known gastroparesis from DM with hyperglycemia with narcotic dependence- anasarca/edema noted  8/19- no issues with HD yest- got off 1L- routine HD today    Plan of Care:    Assessment:  ESRD on HD TTS  HTN  Hyperkalemia  SHPT  Anemia of CKD    Plan:    - ordered HD TTS  - continue home BP meds  - UF 2-3L  - renal diet, 1.5L fluid restriction  - adjust dialysate  - resume binder with meals if taking  - ordered SHELLEY with HD    Thank you for allowing us to participate in this patient's care. We will continue to follow.    Vital Signs:  Temp Readings from Last 3 Encounters:   08/19/23 99.2 °F (37.3 °C) (Oral)   08/03/23 98.2 °F (36.8 °C)   07/31/23 97.7 °F (36.5 °C)       Pulse Readings from Last 3 Encounters:   08/19/23 88   08/03/23 80   08/01/23 80       BP Readings from Last 3 Encounters:   08/19/23 (!) 162/88   08/03/23 132/87   08/01/23 (!) 152/97       Weight:  Wt Readings from Last 3 Encounters:   08/17/23 55.9 kg (123 lb 3.2  oz)   08/01/23 54.9 kg (120 lb 15.2 oz)   07/31/23 50.8 kg (112 lb)       Medications:  Scheduled Meds:   amLODIPine  5 mg Oral Daily    apixaban  5 mg Oral BID    carvediloL  25 mg Oral BID    epoetin teresa (PROCRIT) injection  100 Units/kg Subcutaneous Every Tues, Thurs, Sat    FLUoxetine  20 mg Oral Daily    gabapentin  200 mg Oral BID    hydrALAZINE  100 mg Oral BID    insulin detemir U-100  18 Units Subcutaneous Daily    isosorbide mononitrate  30 mg Oral Daily    levETIRAcetam  500 mg Oral BID    pantoprazole  40 mg Oral Daily    sodium chloride 0.9%  10 mL Intravenous Q12H     Continuous Infusions:  PRN Meds:.acetaminophen, dextrose 50%, dextrose 50%, glucagon (human recombinant), glucose, glucose, heparin (porcine), hydrALAZINE, HYDROmorphone, insulin aspart U-100, melatonin, oxyCODONE-acetaminophen, promethazine (PHENERGAN) 12.5 mg in dextrose 5 % (D5W) 50 mL IVPB  No current facility-administered medications on file prior to encounter.     Current Outpatient Medications on File Prior to Encounter   Medication Sig Dispense Refill    amLODIPine (NORVASC) 5 MG tablet Take 1 tablet (5 mg total) by mouth once daily. 30 tablet 1    apixaban (ELIQUIS) 5 mg Tab Take 1 tablet (5 mg total) by mouth 2 (two) times daily. 60 tablet 2    carvediloL (COREG) 25 MG tablet Take 1 tablet (25 mg total) by mouth 2 (two) times daily. 60 tablet 5    FLUoxetine 20 MG capsule Take 1 capsule (20 mg total) by mouth once daily. 30 capsule 5    gabapentin (NEURONTIN) 300 MG capsule Take 2 capsules twice a day by oral route for 90 days. (Patient taking differently: Take 600 mg by mouth 2 (two) times daily.) 360 capsule 1    hydrALAZINE (APRESOLINE) 100 MG tablet Take 1 tablet (100 mg total) by mouth 2 (two) times a day. 60 tablet 2    HYDROcodone-acetaminophen (NORCO) 7.5-325 mg per tablet Take 1 tablet by mouth every 6 (six) hours as needed for Pain. 30 tablet 0    insulin aspart U-100 (NOVOLOG FLEXPEN U-100 INSULIN) 100 unit/mL (3 mL)  "InPn pen Inject 8 units 3 times a day by subcutaneous route before meals 24 mL 1    insulin detemir U-100, Levemir, (LEVEMIR FLEXTOUCH U100 INSULIN) 100 unit/mL (3 mL) InPn pen Inject 18 units every day by subcutaneous route in the evening 18 mL 1    isosorbide mononitrate (IMDUR) 30 MG 24 hr tablet Take 1 tablet (30 mg total) by mouth once daily. 30 tablet 1    lancets 33 gauge Misc Use to test twice daily 100 each 5    levETIRAcetam (KEPPRA) 500 MG Tab Take 1 tablet twice a day by oral route for 30 days. 60 tablet 2    pantoprazole (PROTONIX) 40 MG tablet Take 1 tablet every day by oral route for 30 days. 30 tablet 2    pen needle, diabetic 31 gauge x 3/16" Ndle Use as directed to inject insulin 5 times daily 100 each 11    promethazine (PHENERGAN) 25 MG suppository Place 1 suppository (25 mg total) rectally every 6 (six) hours as needed for Nausea. 10 suppository 0    [DISCONTINUED] atenoloL (TENORMIN) 50 MG tablet Take 1 tablet (50 mg total) by mouth every other day. 45 tablet 3    [DISCONTINUED] omeprazole (PRILOSEC) 20 MG capsule Take 2 capsules (40 mg total) by mouth once daily. for 10 days 20 capsule 0    [DISCONTINUED] ondansetron (ZOFRAN-ODT) 4 MG TbDL Place 1 tablet (4 mg total) under the tongue every 8 (eight) hours. 24 tablet 2    [DISCONTINUED] sucralfate (CARAFATE) 100 mg/mL suspension Take 10 mL 4 times a day by oral route before meals for 30 days. (Patient taking differently: Take 10 mLs by mouth 4 (four) times daily with meals and nightly.) 1260 mL 1       Review of Systems:  Neg    Physical Exam:  General Appearance:    NAD, AAO x 3, cooperative, appears stated age   Head:    Normocephalic, atraumatic   Eyes:    PER, EOMI, and conjunctiva/sclera clear bilaterally       Mouth:   Moist mucus membranes, no thrush or oral lesions,       normal dentition   Back:     No CVA tenderness   Lungs:     Clear to auscultation bilaterally, no wheezes, crackles,           rales or rhonchi, symmetric air " movement, respirations unlabored   Chest wall:    No tenderness or deformity   Heart:    Regular rate and rhythm, S1 and S2 normal, no murmur, rub   or gallop   Abdomen:     Soft, non-tender, non-distended, bowel sounds active all four   quadrants, no RT or guarding, no masses, no organomegaly   Extremities:   Warm and well perfused, distal pulses are intact, no             cyanosis or peripheral edema   MSK:   No joint or muscle swelling, tenderness or deformity   Skin:   Skin color, texture, turgor normal, no rashes or lesions   Neurologic/Psychiatric:   CNII-XII intact, normal strength and sensation       throughout, no asterixis; normal affect, memory, judgement     and insight      Results:  Lab Results   Component Value Date     08/19/2023    K 4.5 08/19/2023     08/19/2023    CO2 20 (L) 08/19/2023    BUN 28 (H) 08/19/2023    CREATININE 4.4 (H) 08/19/2023    CALCIUM 8.9 08/19/2023    ANIONGAP 11 08/19/2023    ESTGFRAFRICA 20 (A) 07/31/2022    EGFRNONAA 18 (A) 07/31/2022       Lab Results   Component Value Date    CALCIUM 8.9 08/19/2023    PHOS 4.4 08/19/2023       Recent Labs   Lab 08/19/23  0323   WBC 7.32   RBC 2.44*   HGB 7.3*   HCT 22.6*   PLT 91*   MCV 93   MCH 29.9   MCHC 32.3         I have personally reviewed pertinent radiological imaging and reports.    I have spent > 35 minutes providing care for this patient for the above diagnoses. These services have included chart/data/imaging review, evaluation, exam, formulation of plan, , note preparation, and discussions with staff involved in this patient's care.    Prateek Clark MD MPH  [facility] [address] [address]  [phone] (p)  [phone] (f)

## 2023-08-19 NOTE — PLAN OF CARE
08/19/23 0937   Final Note   Assessment Type Final Discharge Note   Anticipated Discharge Disposition Home   What phone number can be called within the next 1-3 days to see how you are doing after discharge? [phone]   Post-Acute Status   Coverage [place] [facility] [place]   Discharge Delays None known at this time     Patient cleared for discharge from case management standpoint.     No further case management needs.

## 2023-08-19 NOTE — CARE UPDATE
08/18/23 2026   Patient Assessment/Suction   Level of Consciousness (AVPU) alert   Respiratory Effort Unlabored   PRE-TX-O2   Device (Oxygen Therapy) nasal cannula   $ Is the patient on Low Flow Oxygen? Yes   Flow (L/min) 2   SpO2 95 %   Pulse Oximetry Type Intermittent   $ Pulse Oximetry - Multiple Charge Pulse Oximetry - Multiple   Pulse 84   Resp 19

## 2023-08-19 NOTE — DISCHARGE SUMMARY
[facility]  Discharge Summary      Patient Name: Tabby Howard  MRN: [id number]  [facility]: [id number]  Patient Class: OP- Observation  Admission Date: 8/17/2023  Hospital Length of Stay: 0 days  Discharge Date and Time: No discharge date for patient encounter.  Attending Physician: Raymundo Parker MD   Discharging Provider: Raymundo Parker MD  Primary Care Provider: Melony Kim NP    Primary Care Team: Networked reference to record PCT     HPI:   Patient is a 33-year-old female with ESRD on hemodialysis(T-Th-S),  gastroparesis, seizure, prior C diff infection, DMII, poor vision, sickle cell trait who presents with abdominal pain.  Abdominal pain is generalized and 10/10.  Pain radiates to her back.  Pain began about 2 hours prior to ED arrival.  Patient says she was unable to attend HD due to the pain.  Last HD was on Tuesday.  Has had associated nausea or vomiting.  No diarrhea.  No fevers.  Denies chest pain or shortness for breath.  Patient is afebrile and hemodynamically stable.  Satting well on room air. WBC 4.7 and hemoglobin 8.7.  Sodium 134, potassium 4.3, bicarb 27, glucose 325, BUN 48, creatinine 5.5.  Lipase within normal limits.  CT abdomen and pelvis with no acute findings.     Patient has been admitted to the hospital 15 times this year.      * No surgery found *      Hospital Course:   Tabby Howard is a 33 year old female with a past medical history of ESRD, diabetic gastroparesis with opiate dependence, DM, HTN, HLD, anemia, MDD/SIOBHAN, DVT, GERD, seizure disorder, and HFpEF who presented with diffuse abdominal pain secondary to the gastroparesis. She was placed on PRN analgesics and antiemetics. Nephrology has been consulted for HD both 8/18 and 8/19. She was able to tolerate PO during her course. She was discharged 8/19/2023.       Goals of Care Treatment Preferences:  Code Status: Full Code    Health care agent: Step-Mother Tanya Howard / Father Garcia  [facility] number: [phone] / [phone]                   Consults:   Consults (From admission, onward)        Status Ordering Provider     Inpatient consult to Nephrology  Once        Provider:  Mando Benitez MD    Completed RAHEL ESPARZA          Neuro  Seizure disorder  -Continue home Keppra      Cardiac/Vascular  Hypertension  -Continue home Coreg, amlodipine, hydralazine and nitrate  -Continue to monitor      Chronic congestive heart failure with left ventricular diastolic dysfunction  -Telemetry  -HD per Nephrology  -Continue Coregl, hydralazine and nitrate    Renal/  ESRD (end stage renal disease)  -HD per Nephrology  -Renally dose medications  -Avoid nephrotoxic agents      Oncology  Anemia in ESRD (end-stage renal disease)  Chronic. Stable.  -EPO per Nephrology  -Trend Hgb with CBC      Endocrine  Type 2 diabetes mellitus with hyperglycemia, with long-term current use of insulin, previous HbA1c 5.8%  Patient's FSGs are controlled on current medication regimen.  Last A1c reviewed-   Lab Results   Component Value Date    HGBA1C 5.8 08/01/2023     Most recent fingerstick glucose reviewed- No results for input(s): "POCTGLUCOSE" in the last 24 hours.  Current correctional scale  Low  Maintain    anti-hyperglycemic dose as follows-   Antihyperglycemics (From admission, onward)    Start     Stop Route Frequency Ordered    08/18/23 0900  insulin detemir U-100 (Levemir) pen 18 Units         -- SubQ Daily 08/18/23 0134    08/17/23 2246  insulin aspart U-100 pen 0-5 Units         -- SubQ Before meals & nightly PRN 08/17/23 2146        Hold Oral hypoglycemics while patient is in the hospital.    GI  GERD (gastroesophageal reflux disease)  -PPI      Gastroparesis - suspected, unconfirmed  Noted. Patient will need gastric empyting study in outpatient setting.      Other  MDD (major depressive disorder)  -Continue fluoxetine      Frequent hospital admissions  Noted. Has been admitted " 15 times this year.      Thrombocytopenia  Chronic.  -Trend platelets with CBC      Deep vein thrombosis (DVT) of non-extremity vein  -Continue Eliquis        Final Active Diagnoses:    Diagnosis Date Noted POA    Seizure disorder [G40.909] 05/29/2022 Yes     Chronic    Gastroparesis - suspected, unconfirmed [K31.84] 04/12/2022 Yes     Chronic    MDD (major depressive disorder) [F32.9] 08/18/2023 Yes    GERD (gastroesophageal reflux disease) [K21.9] 08/18/2023 Yes    Hypertension [I10] 06/17/2023 Yes    Anemia in ESRD (end-stage renal disease) [N18.6, D63.1] 03/20/2023 Yes    Frequent hospital admissions [Z78.9] 03/10/2023 Yes    Chronic congestive heart failure with left ventricular diastolic dysfunction [I50.32] 03/02/2023 Yes    Type 2 diabetes mellitus with hyperglycemia, with long-term current use of insulin, previous HbA1c 5.8% [E11.65, Z79.4] 01/27/2023 Not Applicable    Thrombocytopenia [D69.6] 10/26/2022 Yes     Chronic    Deep vein thrombosis (DVT) of non-extremity vein [I82.90] 07/26/2022 Yes     Chronic    ESRD (end stage renal disease) [N18.6] 07/22/2022 Yes      Problems Resolved During this Admission:    Diagnosis Date Noted Date Resolved POA    PRINCIPAL PROBLEM:  Generalized abdominal pain [R10.84] 09/15/2018 08/19/2023 Yes    Anemia of chronic kidney failure, stage 5 [N18.5, D63.1] 10/31/2022 08/19/2023 Yes     Chronic       Discharged Condition: stable    Disposition: Home or Self Care    Follow Up:    Patient Instructions:      Diet diabetic     Diet renal     Notify your health care provider if you experience any of the following:  increased confusion or weakness     Notify your health care provider if you experience any of the following:  persistent dizziness, light-headedness, or visual disturbances     Notify your health care provider if you experience any of the following:  severe persistent headache     Notify your health care provider if you experience any of the following:   "severe uncontrolled pain     Notify your health care provider if you experience any of the following:  persistent nausea and vomiting or diarrhea     Notify your health care provider if you experience any of the following:  temperature >100.4     Notify your health care provider if you experience any of the following:  difficulty breathing or increased cough     Activity as tolerated       Significant Diagnostic Studies: Labs: All labs within the past 24 hours have been reviewed    Pending Diagnostic Studies:     None         Medications:  Reconciled Home Medications:      Medication List      CHANGE how you take these medications    insulin aspart U-100 100 unit/mL (3 mL) Inpn pen  Commonly known as: NovoLOG FlexPen U-100 Insulin  Inject 8 units 3 times a day by subcutaneous route before meals  What changed:   · how much to take  · when to take this     LEVEMIR FLEXTOUCH U100 INSULIN 100 unit/mL (3 mL) Inpn pen  Generic drug: insulin detemir U-100 (Levemir)  Inject 18 units every day by subcutaneous route in the evening  What changed:   · how much to take  · when to take this     ondansetron 4 MG Tbdl  Commonly known as: ZOFRAN-ODT  Take 1 tablet (4 mg total) by mouth every 8 (eight) hours as needed.  What changed:   · when to take this  · reasons to take this     pantoprazole 40 MG tablet  Commonly known as: PROTONIX  Take 1 tablet every day by oral route for 30 days.  What changed:   · how much to take  · when to take this        CONTINUE taking these medications    amLODIPine 5 MG tablet  Commonly known as: NORVASC  Take 1 tablet (5 mg total) by mouth once daily.     BD ULTRA-FINE MINI PEN NEEDLE 31 gauge x 3/16" Ndle  Generic drug: pen needle, diabetic  Use as directed to inject insulin 5 times daily     carvediloL 25 MG tablet  Commonly known as: COREG  Take 1 tablet (25 mg total) by mouth 2 (two) times daily.     ELIQUIS 5 mg Tab  Generic drug: apixaban  Take 1 tablet (5 mg total) by mouth 2 (two) times " daily.     FLUoxetine 20 MG capsule  Take 1 capsule (20 mg total) by mouth once daily.     gabapentin 300 MG capsule  Commonly known as: NEURONTIN  Take 2 capsules twice a day by oral route for 90 days.     hydrALAZINE 100 MG tablet  Commonly known as: APRESOLINE  Take 1 tablet (100 mg total) by mouth 2 (two) times a day.     HYDROcodone-acetaminophen 7.5-325 mg per tablet  Commonly known as: NORCO  Take 1 tablet by mouth every 6 (six) hours as needed for Pain.     isosorbide mononitrate 30 MG 24 hr tablet  Commonly known as: IMDUR  Take 1 tablet (30 mg total) by mouth once daily.     lancets 33 gauge Misc  Use to test twice daily     levETIRAcetam 500 MG Tab  Commonly known as: KEPPRA  Take 1 tablet twice a day by oral route for 30 days.     promethazine 25 MG suppository  Commonly known as: PHENERGAN  Place 1 suppository (25 mg total) rectally every 6 (six) hours as needed for Nausea.        STOP taking these medications    sucralfate 100 mg/mL suspension  Commonly known as: CARAFATE            Indwelling Lines/Drains at time of discharge:   Lines/Drains/Airways     Central Venous Catheter Line  Duration                Hemodialysis Catheter 06/17/23 1135 right internal jugular 62 days                Time spent on the discharge of patient: 31 minutes         Raymundo Parker MD  Department of Hospital Medicine  Formerly [facility], [facility]

## 2023-08-19 NOTE — PROGRESS NOTES
Net UF 3 L  HD tx tolerated well       08/19/23 1720   Handoff Report   Received From Stephanie Mena   Given To Yvonne   Vital Signs   Temp 98.4 °F (36.9 °C)   Temp Source Oral   Pulse 80   Resp 18   SpO2 95 %   Pulse Oximetry Type Intermittent   Device (Oxygen Therapy) room air   BP (!) 156/81   BP Location Left arm   BP Method Automatic   Patient Position Lying   Assessments (Pre/Post)   Blood Liters Processed (BLP) 66.2   Transport Modality bed   Level of Consciousness (AVPU) alert   Dialyzer Clearance moderately streaked        Hemodialysis Catheter 06/17/23 1135 right internal jugular   Placement Date/Time: 06/17/23 1135   Present Prior to Hospital Arrival?: No  Hand Hygiene: Performed  Barrier Precautions: Performed  Skin Antisepsis: ChloraPrep  Hemodialysis Catheter Type: Tunneled catheter  Location: right internal jugular  Cathete...   Line Necessity Review CRRT/HD   Site Assessment No drainage;No redness;No swelling;No warmth   Line Securement Device Secured with sutures   Dressing Type CHG impregnated dressing/sponge   Dressing Status Clean;Dry;Intact   Dressing Intervention Integrity maintained   Date on Dressing 08/18/23   Dressing Due to be Changed 08/24/23   Venous Patency/Care flushed w/o difficulty;heparin locked   Arterial Patency/Care flushed w/o difficulty;heparin locked   Waveform Not being transduced   Post-Hemodialysis Assessment   Rinseback Volume (mL) 250 mL   Blood Volume Processed (Liters) 66.2 L   Dialyzer Clearance Moderately streaked   Duration of Treatment 180 minutes   Additional Fluid Intake (mL) 500 mL   Total UF (mL) 3500 mL   Net Fluid Removal 3000   Patient Response to Treatment Tolerated well   Post-Treatment Weight 52.9 kg (116 lb 10 oz)   Treatment Weight Change -3   Post-Hemodialysis Comments Tx complete, all blood reinfused per protocol

## 2023-08-20 NOTE — NURSING
Dc instructions given, including medications. Pt verbalizes understanding. Pt Dc'ed home via wheelchair on room air to POV with father. Pt tolerated well. No s/s of pain or distress noted.

## 2023-09-10 ENCOUNTER — HOSPITAL ENCOUNTER (OUTPATIENT)
Facility: HOSPITAL | Age: 33
Discharge: HOME | End: 2023-09-11
Attending: EMERGENCY MEDICINE | Admitting: STUDENT IN AN ORGANIZED HEALTH CARE EDUCATION/TRAINING PROGRAM
Payer: MEDICAID

## 2023-09-10 DIAGNOSIS — R10.9 ABDOMINAL PAIN: ICD-10-CM

## 2023-09-10 DIAGNOSIS — R10.9 ABDOMINAL PAIN WITH VOMITING: ICD-10-CM

## 2023-09-10 DIAGNOSIS — R11.10 ABDOMINAL PAIN WITH VOMITING: ICD-10-CM

## 2023-09-10 DIAGNOSIS — R11.2 NAUSEA AND VOMITING, UNSPECIFIED VOMITING TYPE: Primary | ICD-10-CM

## 2023-09-10 DIAGNOSIS — R07.9 CHEST PAIN: ICD-10-CM

## 2023-09-10 DIAGNOSIS — I31.39 PERICARDIAL EFFUSION: ICD-10-CM

## 2023-09-10 DIAGNOSIS — D64.9 ANEMIA, UNSPECIFIED TYPE: ICD-10-CM

## 2023-09-10 LAB
ABO + RH BLD: NORMAL
ALBUMIN SERPL BCP-MCNC: 3.5 G/DL (ref 3.5–5.2)
ALP SERPL-CCNC: 223 U/L (ref 55–135)
ALT SERPL W/O P-5'-P-CCNC: 53 U/L (ref 10–44)
ANION GAP SERPL CALC-SCNC: 10 MMOL/L (ref 8–16)
AST SERPL-CCNC: 32 U/L (ref 10–40)
BASOPHILS # BLD AUTO: 0.01 K/UL (ref 0–0.2)
BASOPHILS NFR BLD: 0.4 % (ref 0–1.9)
BILIRUB SERPL-MCNC: 1.7 MG/DL (ref 0.1–1)
BLD GP AB SCN CELLS X3 SERPL QL: NORMAL
BUN SERPL-MCNC: 19 MG/DL (ref 6–20)
CALCIUM SERPL-MCNC: 9 MG/DL (ref 8.7–10.5)
CHLORIDE SERPL-SCNC: 98 MMOL/L (ref 95–110)
CK SERPL-CCNC: 98 U/L (ref 20–180)
CO2 SERPL-SCNC: 30 MMOL/L (ref 23–29)
CREAT SERPL-MCNC: 4.2 MG/DL (ref 0.5–1.4)
DIFFERENTIAL METHOD: ABNORMAL
EOSINOPHIL # BLD AUTO: 0.1 K/UL (ref 0–0.5)
EOSINOPHIL NFR BLD: 2.8 % (ref 0–8)
ERYTHROCYTE [DISTWIDTH] IN BLOOD BY AUTOMATED COUNT: 20.4 % (ref 11.5–14.5)
EST. GFR  (NO RACE VARIABLE): 13.5 ML/MIN/1.73 M^2
GLUCOSE SERPL-MCNC: 143 MG/DL (ref 70–110)
GLUCOSE SERPL-MCNC: 188 MG/DL (ref 70–110)
GROUP A STREP, MOLECULAR: NEGATIVE
HCG INTACT+B SERPL-ACNC: 1.3 MIU/ML
HCT VFR BLD AUTO: 21.9 % (ref 37–48.5)
HGB BLD-MCNC: 7.4 G/DL (ref 12–16)
IMM GRANULOCYTES # BLD AUTO: 0.01 K/UL (ref 0–0.04)
IMM GRANULOCYTES NFR BLD AUTO: 0.4 % (ref 0–0.5)
INFLUENZA A, MOLECULAR: NEGATIVE
INFLUENZA B, MOLECULAR: NEGATIVE
LACTATE SERPL-SCNC: 1.1 MMOL/L (ref 0.5–1.9)
LIPASE SERPL-CCNC: 22 U/L (ref 4–60)
LIPASE SERPL-CCNC: 25 U/L (ref 4–60)
LYMPHOCYTES # BLD AUTO: 0.5 K/UL (ref 1–4.8)
LYMPHOCYTES NFR BLD: 20.2 % (ref 18–48)
MAGNESIUM SERPL-MCNC: 2 MG/DL (ref 1.6–2.6)
MCH RBC QN AUTO: 30.6 PG (ref 27–31)
MCHC RBC AUTO-ENTMCNC: 33.8 G/DL (ref 32–36)
MCV RBC AUTO: 91 FL (ref 82–98)
MONOCYTES # BLD AUTO: 0.2 K/UL (ref 0.3–1)
MONOCYTES NFR BLD: 7.5 % (ref 4–15)
NEUTROPHILS # BLD AUTO: 1.7 K/UL (ref 1.8–7.7)
NEUTROPHILS NFR BLD: 68.7 % (ref 38–73)
NRBC BLD-RTO: 0 /100 WBC
PLATELET # BLD AUTO: 154 K/UL (ref 150–450)
PMV BLD AUTO: 11.9 FL (ref 9.2–12.9)
POTASSIUM SERPL-SCNC: 3.9 MMOL/L (ref 3.5–5.1)
PROT SERPL-MCNC: 7 G/DL (ref 6–8.4)
RBC # BLD AUTO: 2.42 M/UL (ref 4–5.4)
SARS-COV-2 RDRP RESP QL NAA+PROBE: NEGATIVE
SODIUM SERPL-SCNC: 138 MMOL/L (ref 136–145)
SPECIMEN OUTDATE: NORMAL
SPECIMEN SOURCE: NORMAL
TROPONIN I SERPL HS-MCNC: 20.6 PG/ML (ref 0–14.9)
TROPONIN I SERPL HS-MCNC: 21.4 PG/ML (ref 0–14.9)
TSH SERPL DL<=0.005 MIU/L-ACNC: 1.8 UIU/ML (ref 0.34–5.6)
WBC # BLD AUTO: 2.53 K/UL (ref 3.9–12.7)

## 2023-09-10 PROCEDURE — 87651 STREP A DNA AMP PROBE: CPT | Performed by: EMERGENCY MEDICINE

## 2023-09-10 PROCEDURE — 80307 DRUG TEST PRSMV CHEM ANLYZR: CPT | Performed by: EMERGENCY MEDICINE

## 2023-09-10 PROCEDURE — 36415 COLL VENOUS BLD VENIPUNCTURE: CPT | Performed by: EMERGENCY MEDICINE

## 2023-09-10 PROCEDURE — 83735 ASSAY OF MAGNESIUM: CPT | Performed by: NURSE PRACTITIONER

## 2023-09-10 PROCEDURE — 93010 EKG 12-LEAD: ICD-10-PCS | Mod: ,,, | Performed by: GENERAL PRACTICE

## 2023-09-10 PROCEDURE — 86850 RBC ANTIBODY SCREEN: CPT | Performed by: EMERGENCY MEDICINE

## 2023-09-10 PROCEDURE — 84702 CHORIONIC GONADOTROPIN TEST: CPT | Performed by: EMERGENCY MEDICINE

## 2023-09-10 PROCEDURE — 87040 BLOOD CULTURE FOR BACTERIA: CPT | Mod: 59 | Performed by: EMERGENCY MEDICINE

## 2023-09-10 PROCEDURE — 82550 ASSAY OF CK (CPK): CPT | Performed by: NURSE PRACTITIONER

## 2023-09-10 PROCEDURE — 63600175 PHARM REV CODE 636 W HCPCS: Performed by: PHYSICAL THERAPY ASSISTANT

## 2023-09-10 PROCEDURE — 84443 ASSAY THYROID STIM HORMONE: CPT | Performed by: NURSE PRACTITIONER

## 2023-09-10 PROCEDURE — G0378 HOSPITAL OBSERVATION PER HR: HCPCS

## 2023-09-10 PROCEDURE — 84484 ASSAY OF TROPONIN QUANT: CPT | Mod: 91 | Performed by: EMERGENCY MEDICINE

## 2023-09-10 PROCEDURE — 25000003 PHARM REV CODE 250: Performed by: PHYSICAL THERAPY ASSISTANT

## 2023-09-10 PROCEDURE — 96372 THER/PROPH/DIAG INJ SC/IM: CPT | Performed by: PHYSICAL THERAPY ASSISTANT

## 2023-09-10 PROCEDURE — 86920 COMPATIBILITY TEST SPIN: CPT | Performed by: INTERNAL MEDICINE

## 2023-09-10 PROCEDURE — U0002 COVID-19 LAB TEST NON-CDC: HCPCS | Performed by: EMERGENCY MEDICINE

## 2023-09-10 PROCEDURE — 85025 COMPLETE CBC W/AUTO DIFF WBC: CPT | Performed by: NURSE PRACTITIONER

## 2023-09-10 PROCEDURE — 93005 ELECTROCARDIOGRAM TRACING: CPT | Performed by: GENERAL PRACTICE

## 2023-09-10 PROCEDURE — 93010 ELECTROCARDIOGRAM REPORT: CPT | Mod: ,,, | Performed by: GENERAL PRACTICE

## 2023-09-10 PROCEDURE — 99285 EMERGENCY DEPT VISIT HI MDM: CPT | Mod: 25

## 2023-09-10 PROCEDURE — 83690 ASSAY OF LIPASE: CPT | Performed by: NURSE PRACTITIONER

## 2023-09-10 PROCEDURE — 63600175 PHARM REV CODE 636 W HCPCS: Performed by: EMERGENCY MEDICINE

## 2023-09-10 PROCEDURE — 96375 TX/PRO/DX INJ NEW DRUG ADDON: CPT | Mod: 59

## 2023-09-10 PROCEDURE — 84484 ASSAY OF TROPONIN QUANT: CPT | Performed by: NURSE PRACTITIONER

## 2023-09-10 PROCEDURE — 25000003 PHARM REV CODE 250: Performed by: EMERGENCY MEDICINE

## 2023-09-10 PROCEDURE — 83605 ASSAY OF LACTIC ACID: CPT | Performed by: EMERGENCY MEDICINE

## 2023-09-10 PROCEDURE — 87502 INFLUENZA DNA AMP PROBE: CPT | Performed by: EMERGENCY MEDICINE

## 2023-09-10 PROCEDURE — 96365 THER/PROPH/DIAG IV INF INIT: CPT

## 2023-09-10 PROCEDURE — 36415 COLL VENOUS BLD VENIPUNCTURE: CPT | Performed by: NURSE PRACTITIONER

## 2023-09-10 PROCEDURE — 80053 COMPREHEN METABOLIC PANEL: CPT | Performed by: NURSE PRACTITIONER

## 2023-09-10 RX ORDER — POLYETHYLENE GLYCOL 3350 17 G/17G
17 POWDER, FOR SOLUTION ORAL DAILY
Status: DISCONTINUED | OUTPATIENT
Start: 2023-09-11 | End: 2023-09-12 | Stop reason: HOSPADM

## 2023-09-10 RX ORDER — ONDANSETRON 2 MG/ML
4 INJECTION INTRAMUSCULAR; INTRAVENOUS
Status: DISCONTINUED | OUTPATIENT
Start: 2023-09-10 | End: 2023-09-10

## 2023-09-10 RX ORDER — SODIUM,POTASSIUM PHOSPHATES 280-250MG
2 POWDER IN PACKET (EA) ORAL
Status: DISCONTINUED | OUTPATIENT
Start: 2023-09-10 | End: 2023-09-12 | Stop reason: HOSPADM

## 2023-09-10 RX ORDER — LEVETIRACETAM 500 MG/1
500 TABLET ORAL 2 TIMES DAILY
Status: DISCONTINUED | OUTPATIENT
Start: 2023-09-10 | End: 2023-09-12 | Stop reason: HOSPADM

## 2023-09-10 RX ORDER — ISOSORBIDE MONONITRATE 30 MG/1
30 TABLET, EXTENDED RELEASE ORAL DAILY
Status: DISCONTINUED | OUTPATIENT
Start: 2023-09-11 | End: 2023-09-12 | Stop reason: HOSPADM

## 2023-09-10 RX ORDER — IBUPROFEN 200 MG
24 TABLET ORAL
Status: DISCONTINUED | OUTPATIENT
Start: 2023-09-10 | End: 2023-09-12 | Stop reason: HOSPADM

## 2023-09-10 RX ORDER — NALOXONE HCL 0.4 MG/ML
0.02 VIAL (ML) INJECTION
Status: DISCONTINUED | OUTPATIENT
Start: 2023-09-10 | End: 2023-09-12 | Stop reason: HOSPADM

## 2023-09-10 RX ORDER — GLUCAGON 1 MG
1 KIT INJECTION
Status: DISCONTINUED | OUTPATIENT
Start: 2023-09-10 | End: 2023-09-12 | Stop reason: HOSPADM

## 2023-09-10 RX ORDER — AMLODIPINE BESYLATE 5 MG/1
5 TABLET ORAL DAILY
Status: DISCONTINUED | OUTPATIENT
Start: 2023-09-11 | End: 2023-09-12 | Stop reason: HOSPADM

## 2023-09-10 RX ORDER — MEROPENEM AND SODIUM CHLORIDE 1 G/50ML
1 INJECTION, SOLUTION INTRAVENOUS ONCE
Status: DISCONTINUED | OUTPATIENT
Start: 2023-09-10 | End: 2023-09-10

## 2023-09-10 RX ORDER — HYDROMORPHONE HYDROCHLORIDE 1 MG/ML
1 INJECTION, SOLUTION INTRAMUSCULAR; INTRAVENOUS; SUBCUTANEOUS EVERY 4 HOURS PRN
Status: DISCONTINUED | OUTPATIENT
Start: 2023-09-10 | End: 2023-09-12 | Stop reason: HOSPADM

## 2023-09-10 RX ORDER — PANTOPRAZOLE SODIUM 40 MG/1
40 TABLET, DELAYED RELEASE ORAL DAILY
Status: DISCONTINUED | OUTPATIENT
Start: 2023-09-11 | End: 2023-09-12 | Stop reason: HOSPADM

## 2023-09-10 RX ORDER — TALC
6 POWDER (GRAM) TOPICAL NIGHTLY PRN
Status: DISCONTINUED | OUTPATIENT
Start: 2023-09-10 | End: 2023-09-12 | Stop reason: HOSPADM

## 2023-09-10 RX ORDER — MEROPENEM AND SODIUM CHLORIDE 1 G/50ML
1 INJECTION, SOLUTION INTRAVENOUS
Status: DISCONTINUED | OUTPATIENT
Start: 2023-09-10 | End: 2023-09-10

## 2023-09-10 RX ORDER — LANOLIN ALCOHOL/MO/W.PET/CERES
800 CREAM (GRAM) TOPICAL
Status: DISCONTINUED | OUTPATIENT
Start: 2023-09-10 | End: 2023-09-12 | Stop reason: HOSPADM

## 2023-09-10 RX ORDER — DROPERIDOL 2.5 MG/ML
0.62 INJECTION, SOLUTION INTRAMUSCULAR; INTRAVENOUS ONCE
Status: COMPLETED | OUTPATIENT
Start: 2023-09-10 | End: 2023-09-10

## 2023-09-10 RX ORDER — HYDRALAZINE HYDROCHLORIDE 25 MG/1
100 TABLET, FILM COATED ORAL 2 TIMES DAILY
Status: DISCONTINUED | OUTPATIENT
Start: 2023-09-10 | End: 2023-09-12 | Stop reason: HOSPADM

## 2023-09-10 RX ORDER — DIPHENHYDRAMINE HYDROCHLORIDE 50 MG/ML
12.5 INJECTION INTRAMUSCULAR; INTRAVENOUS
Status: COMPLETED | OUTPATIENT
Start: 2023-09-10 | End: 2023-09-10

## 2023-09-10 RX ORDER — IBUPROFEN 200 MG
16 TABLET ORAL
Status: DISCONTINUED | OUTPATIENT
Start: 2023-09-10 | End: 2023-09-12 | Stop reason: HOSPADM

## 2023-09-10 RX ORDER — CARVEDILOL 25 MG/1
25 TABLET ORAL 2 TIMES DAILY
Status: DISCONTINUED | OUTPATIENT
Start: 2023-09-10 | End: 2023-09-12 | Stop reason: HOSPADM

## 2023-09-10 RX ORDER — FLUOXETINE HYDROCHLORIDE 20 MG/1
20 CAPSULE ORAL DAILY
Status: DISCONTINUED | OUTPATIENT
Start: 2023-09-11 | End: 2023-09-12 | Stop reason: HOSPADM

## 2023-09-10 RX ORDER — INSULIN ASPART 100 [IU]/ML
8 INJECTION, SOLUTION INTRAVENOUS; SUBCUTANEOUS
Status: DISCONTINUED | OUTPATIENT
Start: 2023-09-11 | End: 2023-09-12 | Stop reason: HOSPADM

## 2023-09-10 RX ORDER — MORPHINE SULFATE 2 MG/ML
2 INJECTION, SOLUTION INTRAMUSCULAR; INTRAVENOUS
Status: DISCONTINUED | OUTPATIENT
Start: 2023-09-10 | End: 2023-09-10

## 2023-09-10 RX ORDER — ACETAMINOPHEN 325 MG/1
650 TABLET ORAL EVERY 4 HOURS PRN
Status: DISCONTINUED | OUTPATIENT
Start: 2023-09-10 | End: 2023-09-12 | Stop reason: HOSPADM

## 2023-09-10 RX ORDER — SODIUM CHLORIDE 0.9 % (FLUSH) 0.9 %
10 SYRINGE (ML) INJECTION EVERY 12 HOURS PRN
Status: DISCONTINUED | OUTPATIENT
Start: 2023-09-10 | End: 2023-09-12 | Stop reason: HOSPADM

## 2023-09-10 RX ORDER — GABAPENTIN 300 MG/1
600 CAPSULE ORAL 2 TIMES DAILY
Status: DISCONTINUED | OUTPATIENT
Start: 2023-09-10 | End: 2023-09-12 | Stop reason: HOSPADM

## 2023-09-10 RX ORDER — INSULIN ASPART 100 [IU]/ML
0-5 INJECTION, SOLUTION INTRAVENOUS; SUBCUTANEOUS
Status: DISCONTINUED | OUTPATIENT
Start: 2023-09-10 | End: 2023-09-12 | Stop reason: HOSPADM

## 2023-09-10 RX ADMIN — LEVETIRACETAM 500 MG: 500 TABLET, FILM COATED ORAL at 10:09

## 2023-09-10 RX ADMIN — APIXABAN 2.5 MG: 2.5 TABLET, FILM COATED ORAL at 09:09

## 2023-09-10 RX ADMIN — CARVEDILOL 25 MG: 25 TABLET, FILM COATED ORAL at 09:09

## 2023-09-10 RX ADMIN — GABAPENTIN 600 MG: 300 CAPSULE ORAL at 09:09

## 2023-09-10 RX ADMIN — HYDRALAZINE HYDROCHLORIDE 100 MG: 25 TABLET ORAL at 09:09

## 2023-09-10 RX ADMIN — DIPHENHYDRAMINE HYDROCHLORIDE 12.5 MG: 50 INJECTION INTRAMUSCULAR; INTRAVENOUS at 04:09

## 2023-09-10 RX ADMIN — DROPERIDOL 0.62 MG: 2.5 INJECTION, SOLUTION INTRAMUSCULAR; INTRAVENOUS at 04:09

## 2023-09-10 RX ADMIN — HYDROMORPHONE HYDROCHLORIDE 1 MG: 1 INJECTION, SOLUTION INTRAMUSCULAR; INTRAVENOUS; SUBCUTANEOUS at 10:09

## 2023-09-10 RX ADMIN — MEROPENEM 1 G: 1 INJECTION, POWDER, FOR SOLUTION INTRAVENOUS at 04:09

## 2023-09-10 RX ADMIN — INSULIN DETEMIR 21 UNITS: 100 INJECTION, SOLUTION SUBCUTANEOUS at 10:09

## 2023-09-10 NOTE — ED PROVIDER NOTES
Encounter Date: 9/10/2023       History     Chief Complaint   Patient presents with    Abdominal Pain     Lower quad abd pain x 3 hours     The history is provided by the patient.   Abdominal Pain  The current episode started today. The onset of the illness was gradual. The problem has not changed since onset.The abdominal pain is generalized. The abdominal pain does not radiate. The severity of the abdominal pain is 8/10. The abdominal pain is relieved by nothing. The other symptoms of the illness include fatigue, nausea and vomiting. The other symptoms of the illness do not include fever, jaundice, shortness of breath, diarrhea or dysuria.   Additional symptoms associated with the illness include chills. Symptoms associated with the illness do not include constipation, urgency, frequency or back pain.   This is a 33-year-old female with multiple medical problems including end-stage renal disease on hemodialysis, last dialyzed yesterday, recurrent gastroparesis, hyper tension, diabetes, heart failure who presents secondary to unrelenting severe upper abdominal pain which has been present throughout the day today.  She is had multiple episodes of emesis.  Has had recurrent symptoms of the same nature in the past attributed to gastroparesis.  Denies fever or chills.  Denies chest pain or shortness of breath.  Is uncertain if there is any gastrointestinal bleeding as she has impaired vision secondary to diabetic retinopathy.  Denies any new visual changes.  Denies any other problems or complaints.    Review of patient's allergies indicates:   Allergen Reactions    Penicillins Hives     Past Medical History:   Diagnosis Date    ESRD on hemodialysis 04/12/2022    Gastritis 12/2022    EGD was 12/5/22    Gastroparesis 04/12/2022    has not had Emptying study    Heart failure with preserved ejection fraction 04/12/2022    EF 55% on 3/22    History of supraventricular tachycardia     Hyperkalemia 07/07/2022    Hypertensive  emergency 2022    Sickle cell trait 2022    Type 2 diabetes mellitus      Past Surgical History:   Procedure Laterality Date     SECTION      x 3    COLONOSCOPY      COLONOSCOPY N/A 2022    Procedure: COLONOSCOPY;  Surgeon: Jagdeep Cedeno MD;  Location: [facility];  Service: Endoscopy;  Laterality: N/A;    ESOPHAGOGASTRODUODENOSCOPY N/A 10/18/2019    Procedure: ESOPHAGOGASTRODUODENOSCOPY (EGD);  Surgeon: Gianluca Mendez MD;  Location: [facility];  Service: Endoscopy;  Laterality: N/A;    ESOPHAGOGASTRODUODENOSCOPY N/A 2022    Procedure: EGD (ESOPHAGOGASTRODUODENOSCOPY);  Surgeon: Micky Paredes III, MD;  Location: [facility];  Service: Endoscopy;  Laterality: N/A;    ESOPHAGOGASTRODUODENOSCOPY N/A 2022    Procedure: EGD (ESOPHAGOGASTRODUODENOSCOPY);  Surgeon: Marcelo Zhong MD;  Location: [facility];  Service: Endoscopy;  Laterality: N/A;    INSERTION, CATHETER, TUNNELED N/A 2023    Procedure: Insertion,catheter,tunneled;  Surgeon: Carlos Thurman Jr., MD;  Location: [facility];  Service: General;  Laterality: N/A;    LAPAROSCOPIC CHOLECYSTECTOMY N/A 2022    Procedure: CHOLECYSTECTOMY, LAPAROSCOPIC;  Surgeon: Grey Perez MD;  Location: [facility] OR;  Service: General;  Laterality: N/A;    PLACEMENT OF DUAL-LUMEN VASCULAR CATHETER Left 2022    Procedure: INSERTION-CATHETER-JOSEPH;  Surgeon: Dionte Gan MD;  Location: [facility] OR;  Service: General;  Laterality: Left;    PLACEMENT OF DUAL-LUMEN VASCULAR CATHETER Right 2022    Procedure: INSERTION-CATHETER-Hemosplit;  Surgeon: Dionte Gan MD;  Location: [facility] OR;  Service: General;  Laterality: Right;     Family History   Problem Relation Age of Onset    Diabetes Mother     Diabetes Father      Social History     Tobacco Use    Smoking status: Never    Smokeless tobacco: Never   Substance Use Topics    Alcohol use: Not Currently    Drug use: No     Review of Systems   Constitutional:  Positive for  chills and fatigue. Negative for activity change, appetite change and fever.   HENT: Negative.  Negative for congestion, ear pain, rhinorrhea, sore throat and trouble swallowing.    Eyes: Negative.  Negative for photophobia, pain, redness and visual disturbance.   Respiratory: Negative.  Negative for cough, chest tightness, shortness of breath and wheezing.    Cardiovascular: Negative.  Negative for chest pain, palpitations and leg swelling.   Gastrointestinal:  Positive for abdominal pain, nausea and vomiting. Negative for abdominal distention, blood in stool, constipation, diarrhea and jaundice.   Endocrine: Negative.    Genitourinary: Negative.  Negative for decreased urine volume, difficulty urinating, dysuria, flank pain, frequency, pelvic pain and urgency.   Musculoskeletal: Negative.  Negative for arthralgias, back pain, gait problem, myalgias, neck pain and neck stiffness.   Skin: Negative.  Negative for rash.   Neurological: Negative.  Negative for dizziness, syncope, facial asymmetry, speech difficulty, weakness, light-headedness, numbness and headaches.   Hematological:  Does not bruise/bleed easily.   Psychiatric/Behavioral:  Positive for agitation. Negative for confusion. The patient is nervous/anxious.    All other systems reviewed and are negative.      Physical Exam     Initial Vitals [09/10/23 1055]   BP Pulse Resp Temp SpO2   (!) 158/84 84 20 98.1 °F (36.7 °C) 100 %      MAP       --         Physical Exam    Nursing note and vitals reviewed.  Constitutional: She is not diaphoretic. She is active and cooperative.  Non-toxic appearance. She does not have a sickly appearance. She does not appear ill. No distress.   HENT:   Head: Normocephalic and atraumatic.   Right Ear: Tympanic membrane normal.   Left Ear: Tympanic membrane normal.   Nose: Nose normal.   Mouth/Throat: Uvula is midline, oropharynx is clear and moist and mucous membranes are normal. No oral lesions. No uvula swelling. No oropharyngeal  exudate, posterior oropharyngeal edema or posterior oropharyngeal erythema.   Eyes: Conjunctivae, EOM and lids are normal. Pupils are equal, round, and reactive to light. No scleral icterus.   Neck: Trachea normal and phonation normal. Neck supple. No thyroid mass and no thyromegaly present. No stridor present. No JVD present.   Normal range of motion.   Full passive range of motion without pain.     Cardiovascular:  Normal rate, regular rhythm, normal heart sounds, intact distal pulses and normal pulses.     Exam reveals no gallop, no distant heart sounds, no friction rub and no decreased pulses.       No murmur heard.  Pulmonary/Chest: Effort normal and breath sounds normal. No accessory muscle usage or stridor. No tachypnea. No respiratory distress. She has no wheezes. She has no rhonchi. She has no rales.   Abdominal: Abdomen is soft. Bowel sounds are normal. She exhibits distension. She exhibits no pulsatile midline mass and no mass. There is generalized abdominal tenderness. No hernia.   No right CVA tenderness.  No left CVA tenderness. There is guarding. There is no rigidity, no rebound, no tenderness at McBurney's point and negative Perez's sign. negative psoas sign and negative Rovsing's sign  Musculoskeletal:         General: No tenderness or edema. Normal range of motion.      Right hand: Normal. Normal range of motion. Normal strength. Normal sensation. Normal capillary refill. Normal pulse.      Left hand: Normal. Normal range of motion. Normal strength. Normal sensation. Normal capillary refill. Normal pulse.      Cervical back: Normal, full passive range of motion without pain, normal range of motion and neck supple. No edema, erythema, rigidity or bony tenderness. No spinous process tenderness or muscular tenderness. Normal range of motion.      Thoracic back: Normal. No bony tenderness. Normal range of motion.      Lumbar back: Normal. No bony tenderness. Normal range of motion.      Right foot:  Normal. Normal range of motion and normal capillary refill. No tenderness or bony tenderness. Normal pulse.      Left foot: Normal. Normal range of motion and normal capillary refill. No tenderness or bony tenderness. Normal pulse.      Comments: Pulses are 2+ throughout, cap refill is less than 2 sec throughout, extremities are nontender throughout with full range of motion. There is no spinal tenderness to palpation.     Neurological: She is alert and oriented to person, place, and time. She has normal strength. She displays normal reflexes. No cranial nerve deficit or sensory deficit. Gait normal.   No focal deficits.   Skin: Skin is warm, dry and intact. Capillary refill takes less than 2 seconds. No ecchymosis, no petechiae and no rash noted. No erythema. No pallor.   Psychiatric: Her speech is normal and behavior is normal. Judgment and thought content normal. Her mood appears anxious. Her affect is labile. Cognition and memory are normal.         ED Course   Procedures  Labs Reviewed   CBC W/ AUTO DIFFERENTIAL - Abnormal; Notable for the following components:       Result Value    WBC 2.53 (*)     RBC 2.42 (*)     Hemoglobin 7.4 (*)     Hematocrit 21.9 (*)     RDW 20.4 (*)     Gran # (ANC) 1.7 (*)     Lymph # 0.5 (*)     Mono # 0.2 (*)     All other components within normal limits   COMPREHENSIVE METABOLIC PANEL - Abnormal; Notable for the following components:    CO2 30 (*)     Glucose 188 (*)     Creatinine 4.2 (*)     Total Bilirubin 1.7 (*)     Alkaline Phosphatase 223 (*)     ALT 53 (*)     eGFR 13.5 (*)     All other components within normal limits    Narrative:     For upper or mid abdominal pain.  Recoll. [id number] by DIONTE at 09/10/2023 14:20, reason: Specimen   hemolyzed. 2nd collection. Suggested straight stick. Notified   Dayana Mckeon RN/ED   TROPONIN I HIGH SENSITIVITY - Abnormal; Notable for the following components:    Troponin I High Sensitivity 21.4 (*)     All other components within  normal limits    Narrative:     Release to patient->Immediate   TROPONIN I HIGH SENSITIVITY - Abnormal; Notable for the following components:    Troponin I High Sensitivity 20.6 (*)     All other components within normal limits   GROUP A STREP, MOLECULAR   LIPASE    Narrative:     For upper or mid abdominal pain.  Recoll. [id number] by [email or web address] at 09/10/2023 14:20, reason: Specimen   hemolyzed. 2nd collection. Suggested straight stick. Notified   Dayana Mckeon RN/ED   CK   MAGNESIUM   TSH   TROPONIN I HIGH SENSITIVITY   SARS-COV-2 RNA AMPLIFICATION, QUAL   INFLUENZA A AND B ANTIGEN    Narrative:     Specimen Source->Nasopharyngeal Swab   HCG, QUANTITATIVE   HCG, QUANTITATIVE   LACTIC ACID, PLASMA    Narrative:     Release to patient->Immediate   MAGNESIUM   TSH   CK   LIPASE   TYPE & SCREEN        ECG Results              EKG 12-lead (Final result)  Result time 09/17/23 12:32:52      Final result by Interface, Lab In [facility] (09/17/23 12:32:52)                   Narrative:    Test Reason : R10.9,    Vent. Rate : 083 BPM     Atrial Rate : 083 BPM     P-R Int : 162 ms          QRS Dur : 086 ms      QT Int : 430 ms       P-R-T Axes : 024 -36 053 degrees     QTc Int : 505 ms    Normal sinus rhythm  Left axis deviation  Minimal voltage criteria for LVH, may be normal variant ( R in aVL )  Possible Anterior infarct ,age undetermined  Prolonged QT  Abnormal ECG  When compared with ECG of 17-AUG-2023 17:55,  Inverted T waves have replaced nonspecific T wave abnormality in Anterior  leads  Confirmed by Brandon SPRINGER, Dustin EPSTEIN ([id number]) on 9/17/2023 12:32:44 PM    Referred By: [email or web address]   SELF           Confirmed By:Dustin Taylor MD                                  Imaging Results              CT Abdomen Pelvis  Without Contrast (Final result)  Result time 09/10/23 17:35:01      Final result by Maik Holcomb MD (09/10/23 17:35:01)                   Narrative:    CMS MANDATED QUALITY DATA - CT RADIATION  436 All CT scans  at this facility utilize dose modulation, iterative reconstruction, and/or weight based dosing when appropriate to reduce radiation dose to as low as reasonably achievable.    CT ABDOMEN PELVIS WITHOUT IV CONTRAST, 9/10/2023 5:22 PM CDT    History:  Epigastric pain.    Comparison: 8/17/2023    Findings:    CT imaging of the abdomen and pelvis was performed without intravenous or oral contrast, utilizing a renal stone protocol. Examination is of limited diagnostic until he for symptoms as questioned, due to the absence of IV and oral contrast.. Unless otherwise stated, incidental findings do not require dedicated follow-up imaging.    The pericardial-cardiac silhouette is enlarged. There is continued pericardial effusion, with the pericardium along the right side heart measuring 2.8 cm maximally in width, worsened from prior. There is patchy bibasilar groundglass type infiltrate.    There is diffuse soft tissue anasarca, and mild diffuse mesenteric edema. There is no bowel obstruction or free air, and no localizing. Clinical mesenteric inflammatory stranding. There are multifocal arterial abdominal atherosclerotic vascular calcifications. The gallbladder is surgically absent.    No renal, ureteral or bladder calculi are identified.    The terminal ileum and the appendix are normal.    There are no destructive pathologic bone lesions.    No additional acute abdomen or pelvic CT abnormality is identified.    Impression:  1. Enlargement of the pericardial-cardiac silhouette with an increased size pericardial effusion, along the right side heart border measuring 2.8 cm maximally today in width versus previous 1.7 cm.  2. Mild patchy nonspecific bibasilar groundglass infiltrate.  3. Diffuse soft tissue anasarca.  4. No additional acute renal stone CT abnormality.    Electronically signed by:  Maik Holcomb MD  9/10/2023 5:35 PM CDT Workstation: [id number]                                     X-Ray Chest AP Portable  (Final result)  Result time 09/10/23 15:00:13      Final result by Omid Blevins IV, MD (09/10/23 15:00:13)                   Narrative:    Chest, single view    HISTORY: Abdomen pain.    Comparison 6/3/2023.    The cardiac silhouette is enlarged but stable. There is no evidence of acute pulmonary vascular engorgement. A dual-lumen right-sided central venous catheter remains in place.    The lungs are appropriately expanded. There are no confluent areas of airspace disease or significant volume loss. No effusion is demonstrated.    IMPRESSION:    Stable cardiomegaly.    No acute cardiopulmonary disease.    Electronically signed by:  Omid Blevins MD  9/10/2023 3:00 PM CDT Workstation: [id number]                                     Medications   droPERidol injection 0.625 mg (0.625 mg Intravenous Given 9/10/23 1625)   diphenhydrAMINE injection 12.5 mg (12.5 mg Intravenous Given 9/10/23 1625)   meropenem (MERREM) 1 g in sodium chloride 0.9% 100 mL IVPB (0 g Intravenous Stopped 9/10/23 0154)     Medical Decision Making  Amount and/or Complexity of Data Reviewed  Labs: ordered.     Details: Findings reviewed  Radiology: ordered.     Details: Findings reviewed, pericardial effusion noted, some mesentery inflammation noted.  Please see report.  Discussion of management or test interpretation with external provider(s): Emergent evaluation of a 33-year-old female with multiple medical problems presenting with severe abdominal pain with multiple episodes of emesis, previous history of similar symptoms.  Abdominal exam with mild diffuse tenderness with guarding but no rebound.  The patient is hemodynamically stable.  She is not exhibiting evidence of shock or hypoperfusion.  CT scan findings reviewed, enlarging pericardial effusion noted.  Mesentery inflammation noted.  Blood cultures have been obtained.  Patient has had some symptomatic improvement with IV medications in the emergency room.  Had recurrence of pain then  improvement.  I did discuss the case with the patient's nephrologist Dr. Benitez.  He discussed the likely need for admission.  I did discuss findings of anemia.  He did not recommend emergent transfusion but may transfused during dialysis tomorrow.  I have discussed the case with the hospitalist provider secondary to constellation of symptoms and worsening pericardial effusion with likely need for echo.  Hospitalist provider has assumed care will admit.  Clinically correlating the patient does not have any evidence to suggest pericardial tamponade--no JVD, no hypotension, no diminished heart sounds.    Risk  Prescription drug management.                               Clinical Impression:   Final diagnoses:  [R10.9] Abdominal pain  [R11.2] Nausea and vomiting, unspecified vomiting type (Primary)  [I31.39] Pericardial effusion  [D64.9] Anemia, unspecified type        ED Disposition Condition    Observation                 Jazmin Moore MD  10/12/23 [id number]

## 2023-09-10 NOTE — ED NOTES
Pt states she has been having abd pain for about 3 hours now. States it is cramping in nature., denies n/v . Pt is dialysis pt and had last treatment yesterday.

## 2023-09-11 ENCOUNTER — CLINICAL SUPPORT (OUTPATIENT)
Dept: CARDIOLOGY | Facility: HOSPITAL | Age: 33
End: 2023-09-11
Attending: EMERGENCY MEDICINE
Payer: MEDICAID

## 2023-09-11 VITALS — HEIGHT: 62 IN | BODY MASS INDEX: 22.19 KG/M2 | WEIGHT: 120.56 LBS

## 2023-09-11 VITALS
OXYGEN SATURATION: 96 % | TEMPERATURE: 99 F | SYSTOLIC BLOOD PRESSURE: 123 MMHG | HEART RATE: 74 BPM | HEIGHT: 62 IN | BODY MASS INDEX: 22.19 KG/M2 | RESPIRATION RATE: 18 BRPM | WEIGHT: 120.56 LBS | DIASTOLIC BLOOD PRESSURE: 77 MMHG

## 2023-09-11 PROBLEM — R10.9 ABDOMINAL PAIN WITH VOMITING: Status: RESOLVED | Noted: 2023-01-12 | Resolved: 2023-09-11

## 2023-09-11 PROBLEM — R11.10 ABDOMINAL PAIN WITH VOMITING: Status: RESOLVED | Noted: 2023-01-12 | Resolved: 2023-09-11

## 2023-09-11 LAB
ALBUMIN SERPL BCP-MCNC: 2.9 G/DL (ref 3.5–5.2)
ALP SERPL-CCNC: 197 U/L (ref 55–135)
ALT SERPL W/O P-5'-P-CCNC: 47 U/L (ref 10–44)
ANION GAP SERPL CALC-SCNC: 10 MMOL/L (ref 8–16)
AST SERPL-CCNC: 39 U/L (ref 10–40)
BASOPHILS # BLD AUTO: 0.01 K/UL (ref 0–0.2)
BASOPHILS NFR BLD: 0.3 % (ref 0–1.9)
BILIRUB SERPL-MCNC: 1.2 MG/DL (ref 0.1–1)
BLD PROD TYP BPU: NORMAL
BLD PROD TYP BPU: NORMAL
BLOOD UNIT EXPIRATION DATE: NORMAL
BLOOD UNIT EXPIRATION DATE: NORMAL
BLOOD UNIT TYPE CODE: 6200
BLOOD UNIT TYPE CODE: 6200
BLOOD UNIT TYPE: NORMAL
BLOOD UNIT TYPE: NORMAL
BSA FOR ECHO PROCEDURE: 1.55 M2
BUN SERPL-MCNC: 25 MG/DL (ref 6–20)
CALCIUM SERPL-MCNC: 8.4 MG/DL (ref 8.7–10.5)
CHLORIDE SERPL-SCNC: 97 MMOL/L (ref 95–110)
CO2 SERPL-SCNC: 30 MMOL/L (ref 23–29)
CODING SYSTEM: NORMAL
CODING SYSTEM: NORMAL
CREAT SERPL-MCNC: 5.2 MG/DL (ref 0.5–1.4)
CROSSMATCH INTERPRETATION: NORMAL
CROSSMATCH INTERPRETATION: NORMAL
CV ECHO LV RWT: 0.58 CM
DIFFERENTIAL METHOD: ABNORMAL
DISPENSE STATUS: NORMAL
DISPENSE STATUS: NORMAL
ECHO LV POSTERIOR WALL: 1.39 CM (ref 0.6–1.1)
EOSINOPHIL # BLD AUTO: 0.1 K/UL (ref 0–0.5)
EOSINOPHIL NFR BLD: 2.3 % (ref 0–8)
ERYTHROCYTE [DISTWIDTH] IN BLOOD BY AUTOMATED COUNT: 19.9 % (ref 11.5–14.5)
EST. GFR  (NO RACE VARIABLE): 10.5 ML/MIN/1.73 M^2
FERRITIN SERPL-MCNC: 1329 NG/ML (ref 20–300)
FRACTIONAL SHORTENING: 26 % (ref 28–44)
GLUCOSE SERPL-MCNC: 177 MG/DL (ref 70–110)
GLUCOSE SERPL-MCNC: 250 MG/DL (ref 70–110)
GLUCOSE SERPL-MCNC: 258 MG/DL (ref 70–110)
GLUCOSE SERPL-MCNC: 267 MG/DL (ref 70–110)
GLUCOSE SERPL-MCNC: 93 MG/DL (ref 70–110)
HCT VFR BLD AUTO: 23.5 % (ref 37–48.5)
HGB BLD-MCNC: 7.6 G/DL (ref 12–16)
IMM GRANULOCYTES # BLD AUTO: 0.01 K/UL (ref 0–0.04)
IMM GRANULOCYTES NFR BLD AUTO: 0.3 % (ref 0–0.5)
INTERVENTRICULAR SEPTUM: 1.23 CM (ref 0.6–1.1)
IRON SERPL-MCNC: 98 UG/DL (ref 30–160)
LEFT INTERNAL DIMENSION IN SYSTOLE: 3.58 CM (ref 2.1–4)
LEFT VENTRICLE DIASTOLIC VOLUME INDEX: 70.78 ML/M2
LEFT VENTRICLE DIASTOLIC VOLUME: 109 ML
LEFT VENTRICLE MASS INDEX: 162 G/M2
LEFT VENTRICLE SYSTOLIC VOLUME INDEX: 34.9 ML/M2
LEFT VENTRICLE SYSTOLIC VOLUME: 53.7 ML
LEFT VENTRICULAR INTERNAL DIMENSION IN DIASTOLE: 4.82 CM (ref 3.5–6)
LEFT VENTRICULAR MASS: 250.08 G
LYMPHOCYTES # BLD AUTO: 0.6 K/UL (ref 1–4.8)
LYMPHOCYTES NFR BLD: 20.1 % (ref 18–48)
MCH RBC QN AUTO: 30 PG (ref 27–31)
MCHC RBC AUTO-ENTMCNC: 32.3 G/DL (ref 32–36)
MCV RBC AUTO: 93 FL (ref 82–98)
MONOCYTES # BLD AUTO: 0.2 K/UL (ref 0.3–1)
MONOCYTES NFR BLD: 7.4 % (ref 4–15)
NEUTROPHILS # BLD AUTO: 2.1 K/UL (ref 1.8–7.7)
NEUTROPHILS NFR BLD: 69.6 % (ref 38–73)
NRBC BLD-RTO: 0 /100 WBC
NUM UNITS TRANS PACKED RBC: NORMAL
NUM UNITS TRANS PACKED RBC: NORMAL
PLATELET # BLD AUTO: 92 K/UL (ref 150–450)
PMV BLD AUTO: 10.9 FL (ref 9.2–12.9)
POTASSIUM SERPL-SCNC: 4.5 MMOL/L (ref 3.5–5.1)
PROT SERPL-MCNC: 6.1 G/DL (ref 6–8.4)
RA PRESSURE ESTIMATED: 3 MMHG
RBC # BLD AUTO: 2.53 M/UL (ref 4–5.4)
SATURATED IRON: 55 % (ref 20–50)
SODIUM SERPL-SCNC: 137 MMOL/L (ref 136–145)
TOTAL IRON BINDING CAPACITY: 178 UG/DL (ref 250–450)
TRANSFERRIN SERPL-MCNC: 127 MG/DL (ref 200–375)
WBC # BLD AUTO: 2.98 K/UL (ref 3.9–12.7)
Z-SCORE OF LEFT VENTRICULAR DIMENSION IN END DIASTOLE: 0.74
Z-SCORE OF LEFT VENTRICULAR DIMENSION IN END SYSTOLE: 2.01

## 2023-09-11 PROCEDURE — 36430 TRANSFUSION BLD/BLD COMPNT: CPT | Mod: 59

## 2023-09-11 PROCEDURE — G0378 HOSPITAL OBSERVATION PER HR: HCPCS

## 2023-09-11 PROCEDURE — P9016 RBC LEUKOCYTES REDUCED: HCPCS | Performed by: INTERNAL MEDICINE

## 2023-09-11 PROCEDURE — 36415 COLL VENOUS BLD VENIPUNCTURE: CPT | Performed by: STUDENT IN AN ORGANIZED HEALTH CARE EDUCATION/TRAINING PROGRAM

## 2023-09-11 PROCEDURE — 63600175 PHARM REV CODE 636 W HCPCS: Performed by: INTERNAL MEDICINE

## 2023-09-11 PROCEDURE — 99213 PR OFFICE/OUTPT VISIT, EST, LEVL III, 20-29 MIN: ICD-10-PCS | Mod: ,,, | Performed by: INTERNAL MEDICINE

## 2023-09-11 PROCEDURE — 90935 HEMODIALYSIS ONE EVALUATION: CPT

## 2023-09-11 PROCEDURE — 80053 COMPREHEN METABOLIC PANEL: CPT | Performed by: PHYSICAL THERAPY ASSISTANT

## 2023-09-11 PROCEDURE — 93308 TTE F-UP OR LMTD: CPT

## 2023-09-11 PROCEDURE — 25000003 PHARM REV CODE 250: Performed by: PHYSICAL THERAPY ASSISTANT

## 2023-09-11 PROCEDURE — 84466 ASSAY OF TRANSFERRIN: CPT | Performed by: STUDENT IN AN ORGANIZED HEALTH CARE EDUCATION/TRAINING PROGRAM

## 2023-09-11 PROCEDURE — 63600175 PHARM REV CODE 636 W HCPCS: Mod: JZ | Performed by: INTERNAL MEDICINE

## 2023-09-11 PROCEDURE — 96376 TX/PRO/DX INJ SAME DRUG ADON: CPT | Mod: 59

## 2023-09-11 PROCEDURE — 96375 TX/PRO/DX INJ NEW DRUG ADDON: CPT | Mod: 59

## 2023-09-11 PROCEDURE — 82728 ASSAY OF FERRITIN: CPT | Performed by: STUDENT IN AN ORGANIZED HEALTH CARE EDUCATION/TRAINING PROGRAM

## 2023-09-11 PROCEDURE — 85025 COMPLETE CBC W/AUTO DIFF WBC: CPT | Performed by: PHYSICAL THERAPY ASSISTANT

## 2023-09-11 PROCEDURE — 96372 THER/PROPH/DIAG INJ SC/IM: CPT | Mod: 59 | Performed by: PHYSICAL THERAPY ASSISTANT

## 2023-09-11 PROCEDURE — 93308 TTE F-UP OR LMTD: CPT | Mod: 26,,, | Performed by: INTERNAL MEDICINE

## 2023-09-11 PROCEDURE — 63600175 PHARM REV CODE 636 W HCPCS: Performed by: PHYSICAL THERAPY ASSISTANT

## 2023-09-11 PROCEDURE — 99213 OFFICE O/P EST LOW 20 MIN: CPT | Mod: ,,, | Performed by: INTERNAL MEDICINE

## 2023-09-11 PROCEDURE — 93308 ECHO (CUPID ONLY): ICD-10-PCS | Mod: 26,,, | Performed by: INTERNAL MEDICINE

## 2023-09-11 PROCEDURE — 25000003 PHARM REV CODE 250: Performed by: INTERNAL MEDICINE

## 2023-09-11 RX ORDER — HEPARIN SODIUM 1000 [USP'U]/ML
5000 INJECTION, SOLUTION INTRAVENOUS; SUBCUTANEOUS
Status: DISCONTINUED | OUTPATIENT
Start: 2023-09-11 | End: 2023-09-12 | Stop reason: HOSPADM

## 2023-09-11 RX ORDER — SODIUM CHLORIDE 9 MG/ML
INJECTION, SOLUTION INTRAVENOUS ONCE
Status: CANCELLED | OUTPATIENT
Start: 2023-09-11 | End: 2023-09-11

## 2023-09-11 RX ORDER — SODIUM CHLORIDE 9 MG/ML
INJECTION, SOLUTION INTRAVENOUS
Status: CANCELLED | OUTPATIENT
Start: 2023-09-11

## 2023-09-11 RX ORDER — PANTOPRAZOLE SODIUM 40 MG/1
40 TABLET, DELAYED RELEASE ORAL DAILY
Start: 2023-09-11 | End: 2023-12-12 | Stop reason: SDUPTHER

## 2023-09-11 RX ORDER — MUPIROCIN 20 MG/G
OINTMENT TOPICAL 2 TIMES DAILY
Status: DISCONTINUED | OUTPATIENT
Start: 2023-09-11 | End: 2023-09-12 | Stop reason: HOSPADM

## 2023-09-11 RX ADMIN — APIXABAN 2.5 MG: 2.5 TABLET, FILM COATED ORAL at 08:09

## 2023-09-11 RX ADMIN — HEPARIN SODIUM 5000 UNITS: 1000 INJECTION, SOLUTION INTRAVENOUS; SUBCUTANEOUS at 12:09

## 2023-09-11 RX ADMIN — FLUOXETINE 20 MG: 20 CAPSULE ORAL at 08:09

## 2023-09-11 RX ADMIN — LEVETIRACETAM 500 MG: 500 TABLET, FILM COATED ORAL at 08:09

## 2023-09-11 RX ADMIN — HYDROMORPHONE HYDROCHLORIDE 1 MG: 1 INJECTION, SOLUTION INTRAMUSCULAR; INTRAVENOUS; SUBCUTANEOUS at 01:09

## 2023-09-11 RX ADMIN — HYDROMORPHONE HYDROCHLORIDE 1 MG: 1 INJECTION, SOLUTION INTRAMUSCULAR; INTRAVENOUS; SUBCUTANEOUS at 07:09

## 2023-09-11 RX ADMIN — AMLODIPINE BESYLATE 5 MG: 5 TABLET ORAL at 08:09

## 2023-09-11 RX ADMIN — GABAPENTIN 600 MG: 300 CAPSULE ORAL at 08:09

## 2023-09-11 RX ADMIN — INSULIN ASPART 8 UNITS: 100 INJECTION, SOLUTION INTRAVENOUS; SUBCUTANEOUS at 05:09

## 2023-09-11 RX ADMIN — MUPIROCIN 1 G: 20 OINTMENT TOPICAL at 08:09

## 2023-09-11 RX ADMIN — ISOSORBIDE MONONITRATE 30 MG: 30 TABLET, EXTENDED RELEASE ORAL at 08:09

## 2023-09-11 RX ADMIN — CARVEDILOL 25 MG: 25 TABLET, FILM COATED ORAL at 08:09

## 2023-09-11 RX ADMIN — POLYETHYLENE GLYCOL 3350 17 G: 17 POWDER, FOR SOLUTION ORAL at 08:09

## 2023-09-11 RX ADMIN — INSULIN ASPART 8 UNITS: 100 INJECTION, SOLUTION INTRAVENOUS; SUBCUTANEOUS at 08:09

## 2023-09-11 RX ADMIN — HYDRALAZINE HYDROCHLORIDE 100 MG: 25 TABLET ORAL at 08:09

## 2023-09-11 RX ADMIN — ERYTHROPOIETIN 5500 UNITS: 10000 INJECTION, SOLUTION INTRAVENOUS; SUBCUTANEOUS at 11:09

## 2023-09-11 RX ADMIN — PANTOPRAZOLE SODIUM 40 MG: 40 TABLET, DELAYED RELEASE ORAL at 06:09

## 2023-09-11 NOTE — HOSPITAL COURSE
Patient was monitored closely during her stay.  Her nausea and vomiting, as well as abdominal pain resolved.  It should be noted that this is chronic and she has been referred to outpatient GI on numerous occasions.  It has been recommended by GI that narcotics should be avoided and can worsen her chronic gastroparesis.  She underwent dialysis receiving 2 units of blood per nephrology recommendations.  Extra volume removal was performed.  Nephrology cleared for D/C.  She was evaluated by cardiology who reviewed an ECHO completed this admission which showed no increase in her chronic, small pericardial effusion with no signs of tamponade.  She was cleared for discharge from their standpoint.  She will f/u with her PCP on discharge, as well as report for regularly scheduled dialysis.    Return precautions were provided.    She was seen on day of discharge and deemed appropriate for discharge.

## 2023-09-11 NOTE — H&P
[facility] - Emergency Dept  Hospital Medicine  History & Physical    Patient Name: Tabby Howard  MRN: [id number]  Patient Class: OP- Observation  Admission Date: 9/10/2023  Attending Physician: Luis M Og MD   Primary Care Provider: Melony Kim NP         Patient information was obtained from patient and ER records.     Subjective:     Principal Problem:Abdominal pain with vomiting    Chief Complaint:   Chief Complaint   Patient presents with    Abdominal Pain     Lower quad abd pain x 3 hours        HPI: Patient is a 33-year-old female past medical history of gastroparesis, sickle cell trait, hypertension, GERD, anemia, ESRD.  Patient presents to the ED today with complaints of severe abdominal pain radiating to the back and nausea/vomiting.  Patient is a dialysis patient who did receive full dialysis treatment yesterday.  Patient has chronic anemia, hemoglobin 7.4.  Sodium 138, potassium 3.9, EGFR 13.5, ALT 53, troponin 21.4 and 20.6.  Blood cultures x2 pending.  Serum drug screening was completed and is pending.  Patient denies use of alcohol or any illicit drug use. Patient was given Inapsine and Benadryl in ED. On exam patient denies abdominal pain, chest pain, nausea or vomiting, shortness of breath.  Patient states indications given in ED helped diminish abdominal pain.  CT today shows ground-glass infiltrates and worsening pericardial effusion on the right side. Cardiology will be consulted for further recommendations on pericardial effusion due to it worsening.  Patient's last echo was completed in May 2023 with an ejection fraction of 47%.  Patient is seen in ED various times over this past year with recurrent complaints of abdominal pain and nausea and vomiting.  Patient saw Heme-Onc in March for symptomatic anemia.  At that time patient was supposed to have ultrasound for spleen and liver and bone marrow biopsies.  Patient reports noncompliance with follow-up and does  not recall these tests being completed.  Patient does state she takes Eliquis.  Patient's nephrologist saw patient in ED, recommended admission with possible dialysis and blood transfusion tomorrow. Discussed code status with patient, patient placed as full code.      Past Medical History:   Diagnosis Date    ESRD on hemodialysis 2022    Gastritis 2022    EGD was 21    Gastroparesis 2022    has not had Emptying study    Heart failure with preserved ejection fraction 2022    EF 55% on 3/22    History of supraventricular tachycardia     Hyperkalemia 2022    Hypertensive emergency 2022    Sickle cell trait 2022    Type 2 diabetes mellitus        Past Surgical History:   Procedure Laterality Date     SECTION      x 3    COLONOSCOPY      COLONOSCOPY N/A 2022    Procedure: COLONOSCOPY;  Surgeon: Jagdeep Cedeno MD;  Location: [facility];  Service: Endoscopy;  Laterality: N/A;    ESOPHAGOGASTRODUODENOSCOPY N/A 10/18/2019    Procedure: ESOPHAGOGASTRODUODENOSCOPY (EGD);  Surgeon: Gianluca Mendez MD;  Location: [facility];  Service: Endoscopy;  Laterality: N/A;    ESOPHAGOGASTRODUODENOSCOPY N/A 2022    Procedure: EGD (ESOPHAGOGASTRODUODENOSCOPY);  Surgeon: Micky Paredes III, MD;  Location: [facility];  Service: Endoscopy;  Laterality: N/A;    ESOPHAGOGASTRODUODENOSCOPY N/A 2022    Procedure: EGD (ESOPHAGOGASTRODUODENOSCOPY);  Surgeon: Marcelo Zhong MD;  Location: [facility] ENDO;  Service: Endoscopy;  Laterality: N/A;    INSERTION, CATHETER, TUNNELED N/A 2023    Procedure: Insertion,catheter,tunneled;  Surgeon: Carlos Thurman Jr., MD;  Location: [facility] OR;  Service: General;  Laterality: N/A;    LAPAROSCOPIC CHOLECYSTECTOMY N/A 2022    Procedure: CHOLECYSTECTOMY, LAPAROSCOPIC;  Surgeon: Grey Perez MD;  Location: [facility] OR;  Service: General;  Laterality: N/A;    PLACEMENT OF DUAL-LUMEN VASCULAR CATHETER Left 2022     Procedure: INSERTION-CATHETER-JOSEPH;  Surgeon: Dionte Gan MD;  Location: [facility] OR;  Service: General;  Laterality: Left;    PLACEMENT OF DUAL-LUMEN VASCULAR CATHETER Right 07/26/2022    Procedure: INSERTION-CATHETER-Hemosplit;  Surgeon: Dionte Gan MD;  Location: [facility] OR;  Service: General;  Laterality: Right;       Review of patient's allergies indicates:   Allergen Reactions    Penicillins Hives       No current facility-administered medications on file prior to encounter.     Current Outpatient Medications on File Prior to Encounter   Medication Sig    amLODIPine (NORVASC) 5 MG tablet Take 1 tablet (5 mg total) by mouth once daily.    apixaban (ELIQUIS) 5 mg Tab Take 1 tablet (5 mg total) by mouth 2 (two) times daily.    carvediloL (COREG) 25 MG tablet Take 1 tablet (25 mg total) by mouth 2 (two) times daily.    FLUoxetine 20 MG capsule Take 1 capsule (20 mg total) by mouth once daily.    gabapentin (NEURONTIN) 300 MG capsule Take 2 capsules twice a day by oral route for 90 days. (Patient taking differently: Take 600 mg by mouth 2 (two) times daily.)    hydrALAZINE (APRESOLINE) 100 MG tablet Take 1 tablet (100 mg total) by mouth 2 (two) times a day.    insulin aspart U-100 (NOVOLOG FLEXPEN U-100 INSULIN) 100 unit/mL (3 mL) InPn pen Inject 8 units 3 times a day by subcutaneous route before meals (Patient taking differently: Inject 8 Units into the skin 3 (three) times daily before meals.)    insulin detemir U-100, Levemir, (LEVEMIR FLEXTOUCH U100 INSULIN) 100 unit/mL (3 mL) InPn pen Inject 21 units every day by subcutaneous route in the evening for 90 days.    isosorbide mononitrate (IMDUR) 30 MG 24 hr tablet Take 1 tablet (30 mg total) by mouth once daily.    levETIRAcetam (KEPPRA) 500 MG Tab Take 1 tablet twice a day by oral route for 30 days.    ondansetron (ZOFRAN-ODT) 4 MG TbDL Take 1 tablet (4 mg total) by mouth every 8 (eight) hours as needed.    pantoprazole (PROTONIX) 40 MG tablet  "Take 1 tablet every day by oral route for 30 days. (Patient taking differently: Take 40 mg by mouth once daily.)    HYDROcodone-acetaminophen (NORCO) 7.5-325 mg per tablet Take 1 tablet by mouth every 6 (six) hours as needed for Pain.    insulin detemir U-100, Levemir, (LEVEMIR FLEXTOUCH U100 INSULIN) 100 unit/mL (3 mL) InPn pen Inject 18 units every day by subcutaneous route in the evening    lancets 33 gauge Misc Use to test twice daily    ondansetron (ZOFRAN-ODT) 4 MG TbDL Place 1 tablet (4 mg total) under the tongue every 8 (eight) hours.    pen needle, diabetic 31 gauge x 3/16" Ndle Use as directed to inject insulin 5 times daily    promethazine (PHENERGAN) 25 MG suppository Place 1 suppository (25 mg total) rectally every 6 (six) hours as needed for Nausea.    [DISCONTINUED] atenoloL (TENORMIN) 50 MG tablet Take 1 tablet (50 mg total) by mouth every other day.    [DISCONTINUED] omeprazole (PRILOSEC) 20 MG capsule Take 2 capsules (40 mg total) by mouth once daily. for 10 days     Family History       Problem Relation (Age of Onset)    Diabetes Mother, Father          Tobacco Use    Smoking status: Never    Smokeless tobacco: Never   Substance and Sexual Activity    Alcohol use: Not Currently    Drug use: No    Sexual activity: Not Currently     Partners: Male     Birth control/protection: I.U.D.     Review of Systems   Constitutional: Negative.  Negative for appetite change, chills, fatigue, fever and unexpected weight change.   HENT: Negative.  Negative for congestion, ear pain, hearing loss, rhinorrhea, sore throat and tinnitus.    Eyes: Negative.  Negative for pain, discharge and redness.   Respiratory: Negative.  Negative for cough, shortness of breath, wheezing and stridor.    Cardiovascular: Negative.  Negative for chest pain, palpitations and leg swelling.   Gastrointestinal:  Positive for abdominal pain, nausea and vomiting. Negative for abdominal distention, constipation and diarrhea. "   Endocrine: Negative.    Genitourinary: Negative.  Negative for decreased urine volume, difficulty urinating, flank pain, hematuria and urgency.   Musculoskeletal: Negative.  Negative for arthralgias, gait problem and myalgias.   Skin: Negative.  Negative for pallor and rash.   Allergic/Immunologic: Negative.  Negative for environmental allergies, food allergies and immunocompromised state.   Neurological: Negative.  Negative for dizziness, syncope, facial asymmetry, weakness and headaches.   Hematological: Negative.    Psychiatric/Behavioral: Negative.  Negative for agitation, confusion, self-injury and suicidal ideas.      Objective:     Vital Signs (Most Recent):  Temp: 98.1 °F (36.7 °C) (09/10/23 1055)  Pulse: 80 (09/10/23 1818)  Resp: 18 (09/10/23 1818)  BP: (!) 170/94 (09/10/23 1818)  SpO2: 98 % (09/10/23 1818) Vital Signs (24h Range):  Temp:  [98.1 °F (36.7 °C)] 98.1 °F (36.7 °C)  Pulse:  [80-84] 80  Resp:  [18-20] 18  SpO2:  [98 %-100 %] 98 %  BP: (158-170)/(84-94) 170/94     Weight: 53.1 kg (117 lb)  Body mass index is 21.4 kg/m².     Physical Exam  Vitals reviewed.   Constitutional:       General: She is not in acute distress.     Appearance: Normal appearance. She is normal weight. She is not toxic-appearing.      Comments: Patient very lethargic during exam.   HENT:      Head: Normocephalic and atraumatic.      Nose: Nose normal. No congestion or rhinorrhea.      Mouth/Throat:      Mouth: Mucous membranes are moist.      Pharynx: Oropharynx is clear.   Eyes:      General:         Right eye: No discharge.         Left eye: No discharge.      Extraocular Movements: Extraocular movements intact.      Conjunctiva/sclera: Conjunctivae normal.      Pupils: Pupils are equal, round, and reactive to light.   Cardiovascular:      Rate and Rhythm: Normal rate and regular rhythm.      Pulses: Normal pulses.      Heart sounds: No murmur heard.     No friction rub. No gallop.   Pulmonary:      Effort: Pulmonary  effort is normal. No respiratory distress.      Breath sounds: No stridor. No wheezing, rhonchi or rales.   Chest:      Chest wall: No tenderness.   Abdominal:      General: Abdomen is flat. Bowel sounds are normal. There is no distension.      Palpations: Abdomen is soft.      Tenderness: There is no abdominal tenderness. There is no right CVA tenderness, left CVA tenderness or guarding.   Musculoskeletal:         General: Normal range of motion.      Cervical back: Normal range of motion and neck supple.   Skin:     General: Skin is warm and dry.      Findings: No rash.   Neurological:      General: No focal deficit present.      Mental Status: She is alert and oriented to person, place, and time. Mental status is at baseline.      Motor: No weakness.   Psychiatric:         Mood and Affect: Mood normal.              CRANIAL NERVES     CN III, IV, VI   Pupils are equal, round, and reactive to light.       Significant Labs: All pertinent labs within the past 24 hours have been reviewed.  CBC:   Recent Labs   Lab 09/10/23  1148   WBC 2.53*   HGB 7.4*   HCT 21.9*        CMP:   Recent Labs   Lab 09/10/23  1453      K 3.9   CL 98   CO2 30*   *   BUN 19   CREATININE 4.2*   CALCIUM 9.0   PROT 7.0   ALBUMIN 3.5   BILITOT 1.7*   ALKPHOS 223*   AST 32   ALT 53*   ANIONGAP 10     Lactic Acid:   Recent Labs   Lab 09/10/23  1644   LACTATE 1.1     Lipase:   Recent Labs   Lab 09/10/23  1453   LIPASE 25  22     Magnesium:   Recent Labs   Lab 09/10/23  1453   MG 2.0     Troponin:   Recent Labs   Lab 09/10/23  1453 09/10/23  1644   TROPONINIHS 20.6* 21.4*     TSH:   Recent Labs   Lab 09/10/23  1453   TSH 1.800       Significant Imaging: I have reviewed all pertinent imaging results/findings within the past 24 hours.    Assessment/Plan:     * Abdominal pain with vomiting  Patient complains of 10/10 pain upon arrival at ED.  Patient was given Inapsine and Benadryl in ED with good symptom relief  Continued pain  management with Dilaudid for severe and unretractable pain  IV fluids as needed    Pericardial effusion  Last echo completed May 2023, 47% EF  Updated echo pending  CT shows -Enlargement of the pericardial-cardiac silhouette with an increased size pericardial effusion, along the right side heart border measuring 2.8 cm maximally today in width versus previous 1.7 cm  Cardiology consult placed for recommendations on further treatment    ESRD (end stage renal disease)  GFR 13.5  Nephrology consulted for potential dialysis tomorrow and recommendations on transfusion      Type 2 diabetes mellitus with hyperglycemia, with long-term current use of insulin, previous HbA1c 5.8%  POCT glucose  Continue home insulin  PRN insulin sliding scale      Hypertension  Continue home regimen and monitor vitals      GERD (gastroesophageal reflux disease)  Continue Protonix      MDD (major depressive disorder)  Continue home regimen      Anemia in ESRD (end-stage renal disease)  Trend CBC  Hemoglobin 7.4      VTE Risk Mitigation (From admission, onward)         Ordered     Reason for No Pharmacological VTE Prophylaxis  Once        Question:  Reasons:  Answer:  Already adequately anticoagulated on oral Anticoagulants    09/10/23 1925     IP VTE HIGH RISK PATIENT  Once         09/10/23 1925     Place sequential compression device  Until discontinued         09/10/23 1925                     On 09/10/2023, patient should be placed in hospital observation services under my care in collaboration with Luis M Og MD.      LO Mueller  Department of Hospital Medicine  [facility] - Emergency Dept

## 2023-09-11 NOTE — ASSESSMENT & PLAN NOTE
GFR 13.5  Nephrology consulted for potential dialysis tomorrow and recommendations on transfusion

## 2023-09-11 NOTE — DISCHARGE SUMMARY
[facility] Medicine  Discharge Summary      Patient Name: Tabby Howard  MRN: [id number]  [facility]: [id number]  Patient Class: OP- Observation  Admission Date: 9/10/2023  Hospital Length of Stay: 0 days  Discharge Date and Time:  09/11/2023 12:43 PM  Attending Physician: Yuli Rodríguez MD   Discharging Provider: Linette Yepez NP  Primary Care Provider: Melony Kim NP    Primary Care Team: Networked reference to record PCT     HPI:   Patient is a 33-year-old female past medical history of gastroparesis, sickle cell trait, hypertension, GERD, anemia, ESRD.  Patient presents to the ED today with complaints of severe abdominal pain radiating to the back and nausea/vomiting.  Patient is a dialysis patient who did receive full dialysis treatment yesterday.  Patient has chronic anemia, hemoglobin 7.4.  Sodium 138, potassium 3.9, EGFR 13.5, ALT 53, troponin 21.4 and 20.6.  Blood cultures x2 pending.  Serum drug screening was completed and is pending.  Patient denies use of alcohol or any illicit drug use. Patient was given Inapsine and Benadryl in ED. On exam patient denies abdominal pain, chest pain, nausea or vomiting, shortness of breath.  Patient states indications given in ED helped diminish abdominal pain.  CT today shows ground-glass infiltrates and worsening pericardial effusion on the right side. Cardiology will be consulted for further recommendations on pericardial effusion due to it worsening.  Patient's last echo was completed in May 2023 with an ejection fraction of 47%.  Patient is seen in ED various times over this past year with recurrent complaints of abdominal pain and nausea and vomiting.  Patient saw Heme-Onc in March for symptomatic anemia.  At that time patient was supposed to have ultrasound for spleen and liver and bone marrow biopsies.  Patient reports noncompliance with follow-up and does not recall these tests being completed.  Patient does state she takes  Eliquis.  Patient's nephrologist saw patient in ED, recommended admission with possible dialysis and blood transfusion tomorrow. Discussed code status with patient, patient placed as full code.      * No surgery found *      Hospital Course:   Patient was monitored closely during her stay.  Her nausea and vomiting, as well as abdominal pain resolved.  It should be noted that this is chronic and she has been referred to outpatient GI on numerous occasions.  It has been recommended by GI that narcotics should be avoided and can worsen her chronic gastroparesis.  She underwent dialysis receiving 2 units of blood per nephrology recommendations.  Extra volume removal was performed.  Nephrology cleared for D/C.  She was evaluated by cardiology who reviewed an ECHO completed this admission which showed no increase in her chronic, small pericardial effusion with no signs of tamponade.  She was cleared for discharge from their standpoint.  She will f/u with her PCP on discharge, as well as report for regularly scheduled dialysis.    Return precautions were provided.    She was seen on day of discharge and deemed appropriate for discharge.       Goals of Care Treatment Preferences:  Code Status: Full Code    Health care agent: Step-Mother Tanya Howard / Father Garcia Howard  Health care agent number: [phone] / [phone]                   Consults:   Consults (From admission, onward)        Status Ordering Provider     IP consult to case management  Once        Provider:  (Not yet assigned)    Completed JAYLA BALDWIN     Inpatient consult to Cardiology  Once        Provider:  Cuong Smith MD    Acknowledged JAMIN GARCES     Inpatient consult to Nephrology  Once        Provider:  Mando Benitez MD    Completed JAMIN GARCES          No new Assessment & Plan notes have been filed under this hospital service since the last note was generated.  Service: Hospital Medicine    Final Active  Diagnoses:    Diagnosis Date Noted POA    GERD (gastroesophageal reflux disease) [K21.9] 08/18/2023 Yes    MDD (major depressive disorder) [F32.9] 08/18/2023 Yes    Hypertension [I10] 06/17/2023 Yes    Anemia in ESRD (end-stage renal disease) [N18.6, D63.1] 03/20/2023 Yes    Type 2 diabetes mellitus with hyperglycemia, with long-term current use of insulin, previous HbA1c 5.8% [E11.65, Z79.4] 01/27/2023 Not Applicable    ESRD (end stage renal disease) [N18.6] 07/22/2022 Yes    Pericardial effusion [I31.39] 03/07/2022 Yes      Problems Resolved During this Admission:    Diagnosis Date Noted Date Resolved POA    PRINCIPAL PROBLEM:  Abdominal pain with vomiting [R10.9, R11.10] 01/12/2023 09/11/2023 Yes       Discharged Condition: good    Disposition: Home or Self Care    Follow Up:   Follow-up Information     Melony Kim NP Follow up.    Specialty: Nurse Practitioner  Contact information:  [address]  [facility] [address] [phone]                       Patient Instructions:      Diet renal     Notify your health care provider if you experience any of the following:  difficulty breathing or increased cough     Activity as tolerated       Significant Diagnostic Studies: Labs:   CMP   Recent Labs   Lab 09/10/23  1453 09/11/23  0340    137   K 3.9 4.5   CL 98 97   CO2 30* 30*   * 250*   BUN 19 25*   CREATININE 4.2* 5.2*   CALCIUM 9.0 8.4*   PROT 7.0 6.1   ALBUMIN 3.5 2.9*   BILITOT 1.7* 1.2*   ALKPHOS 223* 197*   AST 32 39   ALT 53* 47*   ANIONGAP 10 10    and CBC   Recent Labs   Lab 09/10/23  1148 09/11/23  0341   WBC 2.53* 2.98*   HGB 7.4* 7.6*   HCT 21.9* 23.5*    92*     Imaging Results          CT Abdomen Pelvis  Without Contrast (Final result)  Result time 09/10/23 17:35:01    Final result by Maik Holcomb MD (09/10/23 17:35:01)                 Narrative:    CMS MANDATED QUALITY DATA - CT RADIATION  436 All CT scans at this facility utilize dose modulation, iterative  reconstruction, and/or weight based dosing when appropriate to reduce radiation dose to as low as reasonably achievable.    CT ABDOMEN PELVIS WITHOUT IV CONTRAST, 9/10/2023 5:22 PM CDT    History:  Epigastric pain.    Comparison: 8/17/2023    Findings:    CT imaging of the abdomen and pelvis was performed without intravenous or oral contrast, utilizing a renal stone protocol. Examination is of limited diagnostic until he for symptoms as questioned, due to the absence of IV and oral contrast.. Unless otherwise stated, incidental findings do not require dedicated follow-up imaging.    The pericardial-cardiac silhouette is enlarged. There is continued pericardial effusion, with the pericardium along the right side heart measuring 2.8 cm maximally in width, worsened from prior. There is patchy bibasilar groundglass type infiltrate.    There is diffuse soft tissue anasarca, and mild diffuse mesenteric edema. There is no bowel obstruction or free air, and no localizing. Clinical mesenteric inflammatory stranding. There are multifocal arterial abdominal atherosclerotic vascular calcifications. The gallbladder is surgically absent.    No renal, ureteral or bladder calculi are identified.    The terminal ileum and the appendix are normal.    There are no destructive pathologic bone lesions.    No additional acute abdomen or pelvic CT abnormality is identified.    Impression:  1. Enlargement of the pericardial-cardiac silhouette with an increased size pericardial effusion, along the right side heart border measuring 2.8 cm maximally today in width versus previous 1.7 cm.  2. Mild patchy nonspecific bibasilar groundglass infiltrate.  3. Diffuse soft tissue anasarca.  4. No additional acute renal stone CT abnormality.    Electronically signed by:  Maik Holcomb MD  9/10/2023 5:35 PM CDT Workstation: [id number]                             X-Ray Chest AP Portable (Final result)  Result time 09/10/23 15:00:13    Final result  "by Omid Blevins IV, MD (09/10/23 15:00:13)                 Narrative:    Chest, single view    HISTORY: Abdomen pain.    Comparison 6/3/2023.    The cardiac silhouette is enlarged but stable. There is no evidence of acute pulmonary vascular engorgement. A dual-lumen right-sided central venous catheter remains in place.    The lungs are appropriately expanded. There are no confluent areas of airspace disease or significant volume loss. No effusion is demonstrated.    IMPRESSION:    Stable cardiomegaly.    No acute cardiopulmonary disease.    Electronically signed by:  Omid Blevins MD  9/10/2023 3:00 PM CDT Workstation: [id number]                            Results for orders placed during the hospital encounter of 09/10/23    Echo    Interpretation Summary    Left Ventricle: The left ventricle is normal in size. Mildly increased ventricular mass. Mildly increased wall thickness. Normal wall motion. There is low normal systolic function with a visually estimated ejection fraction of 50 - 55%. There is normal diastolic function.    Right Ventricle: Normal right ventricular cavity size. Wall thickness is normal. Right ventricle wall motion  is normal. Systolic function is normal.    IVC/SVC: Normal venous pressure at 3 mmHg.    Pericardium: There is a small circumferential effusion. Pericardial effusion is echolucent. No indication of cardiac tamponade. Evidence includes no chamber collapse.    Pending Diagnostic Studies:     None         Medications:  Reconciled Home Medications:      Medication List      CONTINUE taking these medications    amLODIPine 5 MG tablet  Commonly known as: NORVASC  Take 1 tablet (5 mg total) by mouth once daily.     BD ULTRA-FINE MINI PEN NEEDLE 31 gauge x 3/16" Ndle  Generic drug: pen needle, diabetic  Use as directed to inject insulin 5 times daily     carvediloL 25 MG tablet  Commonly known as: COREG  Take 1 tablet (25 mg total) by mouth 2 (two) times daily.     ELIQUIS 5 mg " Tab  Generic drug: apixaban  Take 1 tablet (5 mg total) by mouth 2 (two) times daily.     FLUoxetine 20 MG capsule  Take 1 capsule (20 mg total) by mouth once daily.     gabapentin 300 MG capsule  Commonly known as: NEURONTIN  Take 2 capsules twice a day by oral route for 90 days.     hydrALAZINE 100 MG tablet  Commonly known as: APRESOLINE  Take 1 tablet (100 mg total) by mouth 2 (two) times a day.     insulin aspart U-100 100 unit/mL (3 mL) Inpn pen  Commonly known as: NovoLOG FlexPen U-100 Insulin  Inject 8 units 3 times a day by subcutaneous route before meals     isosorbide mononitrate 30 MG 24 hr tablet  Commonly known as: IMDUR  Take 1 tablet (30 mg total) by mouth once daily.     lancets 33 gauge Misc  Use to test twice daily     * LEVEMIR FLEXTOUCH U100 INSULIN 100 unit/mL (3 mL) Inpn pen  Generic drug: insulin detemir U-100 (Levemir)  Inject 18 units every day by subcutaneous route in the evening     * LEVEMIR FLEXPEN 100 unit/mL (3 mL) Inpn pen  Generic drug: insulin detemir U-100 (Levemir)  Inject 21 units every day by subcutaneous route in the evening for 90 days.     levETIRAcetam 500 MG Tab  Commonly known as: KEPPRA  Take 1 tablet twice a day by oral route for 30 days.     * ondansetron 4 MG Tbdl  Commonly known as: ZOFRAN-ODT  Place 1 tablet (4 mg total) under the tongue every 8 (eight) hours.     * ondansetron 4 MG Tbdl  Commonly known as: ZOFRAN-ODT  Take 1 tablet (4 mg total) by mouth every 8 (eight) hours as needed.     pantoprazole 40 MG tablet  Commonly known as: PROTONIX  Take 1 tablet (40 mg total) by mouth once daily.     promethazine 25 MG suppository  Commonly known as: PHENERGAN  Place 1 suppository (25 mg total) rectally every 6 (six) hours as needed for Nausea.         * This list has 4 medication(s) that are the same as other medications prescribed for you. Read the directions carefully, and ask your doctor or other care provider to review them with you.            STOP taking these  medications    HYDROcodone-acetaminophen 7.5-325 mg per tablet  Commonly known as: NORCO            Indwelling Lines/Drains at time of discharge:   Lines/Drains/Airways     Central Venous Catheter Line  Duration                Hemodialysis Catheter 06/17/23 1135 right internal jugular 86 days                Time spent on the discharge of patient: 35 minutes         Linette Yepez NP  Department of Hospital Medicine  [facility]

## 2023-09-11 NOTE — PROGRESS NOTES
Pharmacist Renal Dose Adjustment Note    Tabby Howard is a 33 y.o. female being treated with Apixaban.    Patient Data:    Vital Signs (Most Recent):  Temp: 98.6 °F (37 °C) (09/10/23 2056)  Pulse: 82 (09/10/23 2056)  Resp: 17 (09/10/23 2056)  BP: (!) 174/105 (nurse notified - ryan graham) (09/10/23 2056)  SpO2: (!) 93 % (09/10/23 2056) Vital Signs (72h Range):  Temp:  [98.1 °F (36.7 °C)-98.6 °F (37 °C)]   Pulse:  [80-84]   Resp:  [17-20]   BP: (158-174)/()   SpO2:  [93 %-100 %]      Recent Labs   Lab 09/10/23  1453   CREATININE 4.2*     Serum creatinine: 4.2 mg/dL (H) 09/10/23 1453  Estimated creatinine clearance: 14.9 mL/min (A)    Apixaban 5 mg twice a day will be changed to Apixaban 2.5 mg twice a day per Renal dose adjustment protocol.    Pharmacist's Name: Neli Lanza  Pharmacist's Extension: [phone]

## 2023-09-11 NOTE — ASSESSMENT & PLAN NOTE
Last echo completed May 2023, 47% EF  Updated echo pending  CT shows -Enlargement of the pericardial-cardiac silhouette with an increased size pericardial effusion, along the right side heart border measuring 2.8 cm maximally today in width versus previous 1.7 cm  Cardiology consult placed for recommendations on further treatment

## 2023-09-11 NOTE — PLAN OF CARE
Patient known to Case management services. Patient resides at home with parents. No HH/coumadin. HD at [facility] on [address]. Discharge plan is home.        09/11/23 1302   Discharge Planning   Assessment Type Discharge Planning Assessment   Resource/Environmental Concerns none   Support Systems Parent   Equipment Currently Used at Home walker, rolling;glucometer   Current Living Arrangements home   Care Facility Name home   Patient/Family Anticipates Transition to home with family   Patient/Family Anticipated Services at Transition none   DME Needed Upon Discharge  none   Discharge Plan A Home with family   Discharge Plan B Home with family

## 2023-09-11 NOTE — ASSESSMENT & PLAN NOTE
Patient complains of 10/10 pain upon arrival at ED.  Patient was given Inapsine and Benadryl in ED with good symptom relief  Continued pain management with Dilaudid for severe and unretractable pain  IV fluids as needed

## 2023-09-11 NOTE — CONSULTS
INPATIENT NEPHROLOGY CONSULT   [facility] NEPHROLOGY    Tabby Howard  09/11/2023    Reason for consultation:    esrd    Chief Complaint:   Chief Complaint   Patient presents with    Abdominal Pain     Lower quad abd pain x 3 hours          History of Present Illness:    Per H and P     Patient is a 33-year-old female past medical history of gastroparesis, sickle cell trait, hypertension, GERD, anemia, ESRD.  Patient presents to the ED today with complaints of severe abdominal pain radiating to the back and nausea/vomiting.  Patient is a dialysis patient who did receive full dialysis treatment yesterday.  Patient has chronic anemia, hemoglobin 7.4.  Sodium 138, potassium 3.9, EGFR 13.5, ALT 53, troponin 21.4 and 20.6.  Blood cultures x2 pending.  Serum drug screening was completed and is pending.  Patient denies use of alcohol or any illicit drug use. Patient was given Inapsine and Benadryl in ED. On exam patient denies abdominal pain, chest pain, nausea or vomiting, shortness of breath.  Patient states indications given in ED helped diminish abdominal pain.  CT today shows ground-glass infiltrates and worsening pericardial effusion on the right side. Cardiology will be consulted for further recommendations on pericardial effusion due to it worsening.  Patient's last echo was completed in May 2023 with an ejection fraction of 47%.  Patient is seen in ED various times over this past year with recurrent complaints of abdominal pain and nausea and vomiting.  Patient saw Heme-Onc in March for symptomatic anemia.  At that time patient was supposed to have ultrasound for spleen and liver and bone marrow biopsies.  Patient reports noncompliance with follow-up and does not recall these tests being completed.  Patient does state she takes Eliquis.  Patient's nephrologist saw patient in ED, recommended admission with possible dialysis and blood transfusion tomorrow. Discussed code status with patient, patient placed as full  code.     Patient seen on hemodialysis for uremic clearance and ultrafiltration.            Plan of Care:       Assessment:     esrd  --continue dialysis per routine  --fluid restrict  --renal dose medication per routine  --continue outpt medication  --continue binders with meals    Anemia  --erythropoiesis stimulating agent with renal replacement therapy  --transfuse with hd today.  Goal hg in ESRD pt 10-12    Hyperphosphatemia  --renal diet  --continue binders    Hypertension  --uf with hd  --fluid restrict  --low salt diet  --continue home medication    Pericardial effusion  --supplemental hd today for uremic clearance and ultrafiltration           Thank you for allowing us to participate in this patient's care. We will continue to follow.    Vital Signs:  Temp Readings from Last 3 Encounters:   23 97.2 °F (36.2 °C) (Axillary)   23 98.4 °F (36.9 °C) (Oral)   23 98.2 °F (36.8 °C)       Pulse Readings from Last 3 Encounters:   23 80   23 80   23 80       BP Readings from Last 3 Encounters:   23 (!) 145/88   23 (!) 156/81   23 132/87       Weight:  Wt Readings from Last 3 Encounters:   09/10/23 54.7 kg (120 lb 9.5 oz)   23 55.9 kg (123 lb 3.2 oz)   23 54.9 kg (120 lb 15.2 oz)       Past Medical & Surgical History:  Past Medical History:   Diagnosis Date    ESRD on hemodialysis 2022    Gastritis 2022    EGD was 21    Gastroparesis 2022    has not had Emptying study    Heart failure with preserved ejection fraction 2022    EF 55% on 3/22    History of supraventricular tachycardia     Hyperkalemia 2022    Hypertensive emergency 2022    Sickle cell trait 2022    Type 2 diabetes mellitus        Past Surgical History:   Procedure Laterality Date     SECTION      x 3    COLONOSCOPY      COLONOSCOPY N/A 2022    Procedure: COLONOSCOPY;  Surgeon: Jagdeep Cedeno MD;  Location: [facility];  Service:  Endoscopy;  Laterality: N/A;    ESOPHAGOGASTRODUODENOSCOPY N/A 10/18/2019    Procedure: ESOPHAGOGASTRODUODENOSCOPY (EGD);  Surgeon: Gianluca Mendez MD;  Location: [facility];  Service: Endoscopy;  Laterality: N/A;    ESOPHAGOGASTRODUODENOSCOPY N/A 08/24/2022    Procedure: EGD (ESOPHAGOGASTRODUODENOSCOPY);  Surgeon: Micky Paredes III, MD;  Location: [facility];  Service: Endoscopy;  Laterality: N/A;    ESOPHAGOGASTRODUODENOSCOPY N/A 12/5/2022    Procedure: EGD (ESOPHAGOGASTRODUODENOSCOPY);  Surgeon: Marcelo Zhong MD;  Location: [facility];  Service: Endoscopy;  Laterality: N/A;    INSERTION, CATHETER, TUNNELED N/A 6/17/2023    Procedure: Insertion,catheter,tunneled;  Surgeon: Carlos Thurman Jr., MD;  Location: [facility] OR;  Service: General;  Laterality: N/A;    LAPAROSCOPIC CHOLECYSTECTOMY N/A 07/30/2022    Procedure: CHOLECYSTECTOMY, LAPAROSCOPIC;  Surgeon: Grey Perez MD;  Location: [facility] OR;  Service: General;  Laterality: N/A;    PLACEMENT OF DUAL-LUMEN VASCULAR CATHETER Left 07/12/2022    Procedure: INSERTION-CATHETER-JOSEPH;  Surgeon: Dionte Gan MD;  Location: [facility] OR;  Service: General;  Laterality: Left;    PLACEMENT OF DUAL-LUMEN VASCULAR CATHETER Right 07/26/2022    Procedure: INSERTION-CATHETER-Hemosplit;  Surgeon: Dionte Gan MD;  Location: [facility] OR;  Service: General;  Laterality: Right;       Past Social History:  Social History     Socioeconomic History    Marital status:    Tobacco Use    Smoking status: Never    Smokeless tobacco: Never   Substance and Sexual Activity    Alcohol use: Not Currently    Drug use: No    Sexual activity: Not Currently     Partners: Male     Birth control/protection: I.U.D.     Social Determinants of Health     Financial Resource Strain: Low Risk  (5/16/2023)    Overall Financial Resource Strain (CARDIA)     Difficulty of Paying Living Expenses: Not very hard   Food Insecurity: No Food Insecurity (5/16/2023)    Hunger Vital Sign     Worried  About Running Out of Food in the Last Year: Never true     Ran Out of Food in the Last Year: Never true   Transportation Needs: No Transportation Needs (5/16/2023)    PRAPARE - Transportation     Lack of Transportation (Medical): No     Lack of Transportation (Non-Medical): No   Physical Activity: Inactive (5/16/2023)    Exercise Vital Sign     Days of Exercise per Week: 0 days     Minutes of Exercise per Session: 0 min   Stress: No Stress Concern Present (5/16/2023)    [employer] [place] [employer] - Occupational Stress Questionnaire     Feeling of Stress : Not at all   Social Connections: Unknown (5/16/2023)    Social Connection and Isolation Panel [NHANES]     Frequency of Communication with Friends and Family: More than three times a week     Frequency of Social Gatherings with Friends and Family: More than three times a week     Attends Jainism Services: Never     Active Member of Clubs or Organizations: No     Attends Club or Organization Meetings: Never     Marital Status: Patient refused   Housing Stability: Low Risk  (5/16/2023)    Housing Stability Vital Sign     Unable to Pay for Housing in the Last Year: No     Number of Places Lived in the Last Year: 1     Unstable Housing in the Last Year: No       Medications:  No current facility-administered medications on file prior to encounter.     Current Outpatient Medications on File Prior to Encounter   Medication Sig Dispense Refill    amLODIPine (NORVASC) 5 MG tablet Take 1 tablet (5 mg total) by mouth once daily. 30 tablet 1    apixaban (ELIQUIS) 5 mg Tab Take 1 tablet (5 mg total) by mouth 2 (two) times daily. 60 tablet 2    carvediloL (COREG) 25 MG tablet Take 1 tablet (25 mg total) by mouth 2 (two) times daily. 60 tablet 5    FLUoxetine 20 MG capsule Take 1 capsule (20 mg total) by mouth once daily. 30 capsule 5    gabapentin (NEURONTIN) 300 MG capsule Take 2 capsules twice a day by oral route for 90 days. (Patient taking differently: Take  "600 mg by mouth 2 (two) times daily.) 360 capsule 1    hydrALAZINE (APRESOLINE) 100 MG tablet Take 1 tablet (100 mg total) by mouth 2 (two) times a day. 60 tablet 2    insulin aspart U-100 (NOVOLOG FLEXPEN U-100 INSULIN) 100 unit/mL (3 mL) InPn pen Inject 8 units 3 times a day by subcutaneous route before meals (Patient taking differently: Inject 8 Units into the skin 3 (three) times daily before meals.) 24 mL 1    insulin detemir U-100, Levemir, (LEVEMIR FLEXTOUCH U100 INSULIN) 100 unit/mL (3 mL) InPn pen Inject 21 units every day by subcutaneous route in the evening for 90 days. 15 mL 1    isosorbide mononitrate (IMDUR) 30 MG 24 hr tablet Take 1 tablet (30 mg total) by mouth once daily. 30 tablet 1    levETIRAcetam (KEPPRA) 500 MG Tab Take 1 tablet twice a day by oral route for 30 days. 60 tablet 2    ondansetron (ZOFRAN-ODT) 4 MG TbDL Take 1 tablet (4 mg total) by mouth every 8 (eight) hours as needed. 24 tablet 2    pantoprazole (PROTONIX) 40 MG tablet Take 1 tablet every day by oral route for 30 days. (Patient taking differently: Take 40 mg by mouth once daily.) 30 tablet 2    HYDROcodone-acetaminophen (NORCO) 7.5-325 mg per tablet Take 1 tablet by mouth every 6 (six) hours as needed for Pain. 30 tablet 0    insulin detemir U-100, Levemir, (LEVEMIR FLEXTOUCH U100 INSULIN) 100 unit/mL (3 mL) InPn pen Inject 18 units every day by subcutaneous route in the evening 18 mL 1    lancets 33 gauge Misc Use to test twice daily 100 each 5    ondansetron (ZOFRAN-ODT) 4 MG TbDL Place 1 tablet (4 mg total) under the tongue every 8 (eight) hours. 24 tablet 2    pen needle, diabetic 31 gauge x 3/16" Ndle Use as directed to inject insulin 5 times daily 100 each 11    promethazine (PHENERGAN) 25 MG suppository Place 1 suppository (25 mg total) rectally every 6 (six) hours as needed for Nausea. 10 suppository 0    [DISCONTINUED] atenoloL (TENORMIN) 50 MG tablet Take 1 tablet (50 mg total) by mouth every other day. 45 tablet 3    " "[DISCONTINUED] omeprazole (PRILOSEC) 20 MG capsule Take 2 capsules (40 mg total) by mouth once daily. for 10 days 20 capsule 0     Scheduled Meds:   amLODIPine  5 mg Oral Daily    apixaban  2.5 mg Oral BID    carvediloL  25 mg Oral BID    FLUoxetine  20 mg Oral Daily    gabapentin  600 mg Oral BID    hydrALAZINE  100 mg Oral BID    insulin aspart U-100  8 Units Subcutaneous TID AC    insulin detemir U-100 (Levemir)  21 Units Subcutaneous QHS    isosorbide mononitrate  30 mg Oral Daily    levETIRAcetam  500 mg Oral BID    pantoprazole  40 mg Oral Daily    polyethylene glycol  17 g Oral Daily     Continuous Infusions:  PRN Meds:.acetaminophen, dextrose 50%, dextrose 50%, glucagon (human recombinant), glucose, glucose, HYDROmorphone, insulin aspart U-100, magnesium oxide, magnesium oxide, melatonin, naloxone, potassium bicarbonate, potassium bicarbonate, potassium bicarbonate, potassium, sodium phosphates, potassium, sodium phosphates, potassium, sodium phosphates, sodium chloride 0.9%    Allergies:  Penicillins    Past Family History:  Reviewed; refer to Hospitalist Admission Note    Review of Systems:  Review of Systems - All 14 systems reviewed and negative, except as noted in HPI    Physical Exam:    BP (!) 145/88 (BP Location: Right arm, Patient Position: Lying)   Pulse 80   Temp 97.2 °F (36.2 °C) (Axillary)   Resp 16   Ht 5' 2" (1.575 m)   Wt 54.7 kg (120 lb 9.5 oz)   LMP  (LMP Unknown) Comment: States she does not get periods  SpO2 95%   Breastfeeding No   BMI 22.06 kg/m²     General Appearance:     No distress when distracted   Head:    Normocephalic, without obvious abnormality, atraumatic   Eyes:    PER, conjunctiva/corneas clear, EOM's intact in both eyes        Throat:   Lips, mucosa, and tongue normal; teeth and gums normal   Back:     Symmetric, no curvature, ROM normal, no CVA tenderness   Lungs:     Diminished    Chest wall:    No tenderness or deformity   Heart:    Regular rate and rhythm, S1 " and S2 normal, no murmur, rub   or gallop   Abdomen:     tender   Extremities:   Extremities normal, atraumatic, no cyanosis or edema   Pulses:   2+ and symmetric all extremities   MSK:   No joint or muscle swelling, tenderness or deformity   Skin:   Skin color, texture, turgor normal, no rashes or lesions   Neurologic:   CNII-XII intact, normal strength and sensation       Throughout.  No flap     Results:  Lab Results   Component Value Date     09/11/2023    K 4.5 09/11/2023    CL 97 09/11/2023    CO2 30 (H) 09/11/2023    BUN 25 (H) 09/11/2023    CREATININE 5.2 (H) 09/11/2023    CALCIUM 8.4 (L) 09/11/2023    ANIONGAP 10 09/11/2023    ESTGFRAFRICA 19 (L) 09/03/2022    EGFRNONAA 18 (A) 07/31/2022       Lab Results   Component Value Date    CALCIUM 8.4 (L) 09/11/2023    PHOS 4.4 08/19/2023       Recent Labs   Lab 09/11/23  0341   WBC 2.98*   RBC 2.53*   HGB 7.6*   HCT 23.5*   PLT 92*   MCV 93   MCH 30.0   MCHC 32.3          I have personally reviewed pertinent radiological imaging and reports.    Imaging Results              CT Abdomen Pelvis  Without Contrast (Final result)  Result time 09/10/23 17:35:01      Final result by Maik Holcomb MD (09/10/23 17:35:01)                   Narrative:    CMS MANDATED QUALITY DATA - CT RADIATION  436 All CT scans at this facility utilize dose modulation, iterative reconstruction, and/or weight based dosing when appropriate to reduce radiation dose to as low as reasonably achievable.    CT ABDOMEN PELVIS WITHOUT IV CONTRAST, 9/10/2023 5:22 PM CDT    History:  Epigastric pain.    Comparison: 8/17/2023    Findings:    CT imaging of the abdomen and pelvis was performed without intravenous or oral contrast, utilizing a renal stone protocol. Examination is of limited diagnostic until he for symptoms as questioned, due to the absence of IV and oral contrast.. Unless otherwise stated, incidental findings do not require dedicated follow-up imaging.    The pericardial-cardiac  silhouette is enlarged. There is continued pericardial effusion, with the pericardium along the right side heart measuring 2.8 cm maximally in width, worsened from prior. There is patchy bibasilar groundglass type infiltrate.    There is diffuse soft tissue anasarca, and mild diffuse mesenteric edema. There is no bowel obstruction or free air, and no localizing. Clinical mesenteric inflammatory stranding. There are multifocal arterial abdominal atherosclerotic vascular calcifications. The gallbladder is surgically absent.    No renal, ureteral or bladder calculi are identified.    The terminal ileum and the appendix are normal.    There are no destructive pathologic bone lesions.    No additional acute abdomen or pelvic CT abnormality is identified.    Impression:  1. Enlargement of the pericardial-cardiac silhouette with an increased size pericardial effusion, along the right side heart border measuring 2.8 cm maximally today in width versus previous 1.7 cm.  2. Mild patchy nonspecific bibasilar groundglass infiltrate.  3. Diffuse soft tissue anasarca.  4. No additional acute renal stone CT abnormality.    Electronically signed by:  Maik Holcomb MD  9/10/2023 5:35 PM CDT Workstation: [id number]                                     X-Ray Chest AP Portable (Final result)  Result time 09/10/23 15:00:13      Final result by Omid Blevins IV, MD (09/10/23 15:00:13)                   Narrative:    Chest, single view    HISTORY: Abdomen pain.    Comparison 6/3/2023.    The cardiac silhouette is enlarged but stable. There is no evidence of acute pulmonary vascular engorgement. A dual-lumen right-sided central venous catheter remains in place.    The lungs are appropriately expanded. There are no confluent areas of airspace disease or significant volume loss. No effusion is demonstrated.    IMPRESSION:    Stable cardiomegaly.    No acute cardiopulmonary disease.    Electronically signed by:  Omid Blevins MD  9/10/2023  3:00 PM CDT Workstation: [id number]                                    Patient care was time spent personally by me on the following activities:   Obtaining a history  Examination of patient.  Providing medical care at the patients bedside.  Developing a treatment plan with patient or surrogate and bedside caregivers  Ordering and reviewing laboratory studies, radiographic studies, pulse oximetry.  Ordering and performing treatments and interventions.  Evaluation of patient's response to treatment.  Discussions with consultants while on the unit and immediately available to the patient.  Re-evaluation of the patient's condition.  Documentation in the medical record.       Hugh Figueroa MD  Nephrology  [facility]  [phone]

## 2023-09-11 NOTE — HPI
Patient is a 33-year-old female past medical history of gastroparesis, sickle cell trait, hypertension, GERD, anemia, ESRD.  Patient presents to the ED today with complaints of severe abdominal pain radiating to the back and nausea/vomiting.  Patient is a dialysis patient who did receive full dialysis treatment yesterday.  Patient has chronic anemia, hemoglobin 7.4.  Sodium 138, potassium 3.9, EGFR 13.5, ALT 53, troponin 21.4 and 20.6.  Blood cultures x2 pending.  Serum drug screening was completed and is pending.  Patient denies use of alcohol or any illicit drug use. Patient was given Inapsine and Benadryl in ED. On exam patient denies abdominal pain, chest pain, nausea or vomiting, shortness of breath.  Patient states indications given in ED helped diminish abdominal pain.  CT today shows ground-glass infiltrates and worsening pericardial effusion on the right side. Cardiology will be consulted for further recommendations on pericardial effusion due to it worsening.  Patient's last echo was completed in May 2023 with an ejection fraction of 47%.  Patient is seen in ED various times over this past year with recurrent complaints of abdominal pain and nausea and vomiting.  Patient saw Heme-Onc in March for symptomatic anemia.  At that time patient was supposed to have ultrasound for spleen and liver and bone marrow biopsies.  Patient reports noncompliance with follow-up and does not recall these tests being completed.  Patient does state she takes Eliquis.  Patient's nephrologist saw patient in ED, recommended admission with possible dialysis and blood transfusion tomorrow. Discussed code status with patient, patient placed as full code.

## 2023-09-11 NOTE — NURSING
Patient refuses to wear telemetry monitor. Patient educated on the dangers of being off monitor and insists on not wearing it.

## 2023-09-11 NOTE — PLAN OF CARE
Discharge orders and chart reviewed. No other discharge needs noted at this time. Pt is clear for discharge from case management. Pt is discharging to home.     PCP appt scheduled and added to AVS       09/11/23 0188   Final Note   Assessment Type Final Discharge Note   Anticipated Discharge Disposition Home   What phone number can be called within the next 1-3 days to see how you are doing after discharge? [phone]   Hospital Resources/Appts/Education Provided Appointments scheduled and added to AVS   Post-Acute Status   Discharge Delays None known at this time

## 2023-09-11 NOTE — CONSULTS
[facility]  Department of Cardiology  Consult Note      PATIENT NAME: Tabby Howard  MRN: [id number]  TODAY'S DATE: 09/11/2023  ADMIT DATE: 9/10/2023                          CONSULT REQUESTED BY: Yuli Rodríguez MD    SUBJECTIVE     PRINCIPAL PROBLEM: Abdominal pain with vomiting      REASON FOR CONSULT:  Worsening pericardial effusion      HPI:      Patient is a 33 y.o. PMH ESRD on HD, gastroparesis, HFpEF, hyperkalemia, HTN, Sickle cell disease, HTN, chronic anemia, who presented to the ED with severe ABD pain radiating to the back with accompanying n/v. CT ABD/PELVIS showed increased pericardial effusion.  Cardiology consulted for further evaluation and echo ordered.  Patient has history of pericardial effusion. ECHO shows small pericardiac effusion with no evidence of tamponade.  Patient examined during dialysis.  2 units PRBC transfused during dialysis.  NAD.  Denies CP/shortness of breath during exam.       ECHO performed today    Left Ventricle: The left ventricle is normal in size. Mildly increased ventricular mass. Mildly increased wall thickness. Normal wall motion. There is low normal systolic function with a visually estimated ejection fraction of 50 - 55%. There is normal diastolic function.    Right Ventricle: Normal right ventricular cavity size. Wall thickness is normal. Right ventricle wall motion  is normal. Systolic function is normal.    IVC/SVC: Normal venous pressure at 3 mmHg.    Pericardium: There is a small circumferential effusion. Pericardial effusion is echolucent. No indication of cardiac tamponade. Evidence includes no chamber collapse.           FROM H&P   Abdominal Pain       Lower quad abd pain x 3 hours         HPI: Patient is a 33-year-old female past medical history of gastroparesis, sickle cell trait, hypertension, GERD, anemia, ESRD.  Patient presents to the ED today with complaints of severe abdominal pain radiating to the back and nausea/vomiting.  Patient is  a dialysis patient who did receive full dialysis treatment yesterday.  Patient has chronic anemia, hemoglobin 7.4.  Sodium 138, potassium 3.9, EGFR 13.5, ALT 53, troponin 21.4 and 20.6.  Blood cultures x2 pending.  Serum drug screening was completed and is pending.  Patient denies use of alcohol or any illicit drug use. Patient was given Inapsine and Benadryl in ED. On exam patient denies abdominal pain, chest pain, nausea or vomiting, shortness of breath.  Patient states indications given in ED helped diminish abdominal pain.  CT today shows ground-glass infiltrates and worsening pericardial effusion on the right side. Cardiology will be consulted for further recommendations on pericardial effusion due to it worsening.  Patient's last echo was completed in May 2023 with an ejection fraction of 47%.  Patient is seen in ED various times over this past year with recurrent complaints of abdominal pain and nausea and vomiting.  Patient saw Heme-Onc in March for symptomatic anemia.  At that time patient was supposed to have ultrasound for spleen and liver and bone marrow biopsies.  Patient reports noncompliance with follow-up and does not recall these tests being completed.  Patient does state she takes Eliquis.  Patient's nephrologist saw patient in ED, recommended admission with possible dialysis and blood transfusion tomorrow. Discussed code status with patient, patient placed as full code.      Review of patient's allergies indicates:   Allergen Reactions    Penicillins Hives       Past Medical History:   Diagnosis Date    ESRD on hemodialysis 04/12/2022    Gastritis 12/2022    EGD was 12/5/22    Gastroparesis 04/12/2022    has not had Emptying study    Heart failure with preserved ejection fraction 04/12/2022    EF 55% on 3/22    History of supraventricular tachycardia     Hyperkalemia 07/07/2022    Hypertensive emergency 07/08/2022    Sickle cell trait 04/12/2022    Type 2 diabetes mellitus      Past Surgical  History:   Procedure Laterality Date     SECTION      x 3    COLONOSCOPY      COLONOSCOPY N/A 2022    Procedure: COLONOSCOPY;  Surgeon: Jagdeep Cedeno MD;  Location: [facility] [place], [place];  Service: Endoscopy;  Laterality: N/A;    ESOPHAGOGASTRODUODENOSCOPY N/A 10/18/2019    Procedure: ESOPHAGOGASTRODUODENOSCOPY (EGD);  Surgeon: Gianluca Mendez MD;  Location: [facility];  Service: Endoscopy;  Laterality: N/A;    ESOPHAGOGASTRODUODENOSCOPY N/A 2022    Procedure: EGD (ESOPHAGOGASTRODUODENOSCOPY);  Surgeon: Micky Paredes III, MD;  Location: [facility] [place], [place];  Service: Endoscopy;  Laterality: N/A;    ESOPHAGOGASTRODUODENOSCOPY N/A 2022    Procedure: EGD (ESOPHAGOGASTRODUODENOSCOPY);  Surgeon: Marcelo Zhong MD;  Location: [facility] ENDO;  Service: Endoscopy;  Laterality: N/A;    INSERTION, CATHETER, TUNNELED N/A 2023    Procedure: Insertion,catheter,tunneled;  Surgeon: Carlos Thurman Jr., MD;  Location: [facility] OR;  Service: General;  Laterality: N/A;    LAPAROSCOPIC CHOLECYSTECTOMY N/A 2022    Procedure: CHOLECYSTECTOMY, LAPAROSCOPIC;  Surgeon: Grey Perez MD;  Location: [facility] OR;  Service: General;  Laterality: N/A;    PLACEMENT OF DUAL-LUMEN VASCULAR CATHETER Left 2022    Procedure: INSERTION-CATHETER-JOSEPH;  Surgeon: Dionte Gan MD;  Location: [facility] OR;  Service: General;  Laterality: Left;    PLACEMENT OF DUAL-LUMEN VASCULAR CATHETER Right 2022    Procedure: INSERTION-CATHETER-Hemosplit;  Surgeon: Dionte Gan MD;  Location: [facility] OR;  Service: General;  Laterality: Right;     Social History     Tobacco Use    Smoking status: Never    Smokeless tobacco: Never   Substance Use Topics    Alcohol use: Not Currently    Drug use: No        REVIEW OF SYSTEMS    As mentioned in HPI    OBJECTIVE     VITAL SIGNS (Most Recent)  Temp: 98.4 °F (36.9 °C) (23 0930)  Pulse: 79 (23 1000)  Resp: 16 (23 0945)  BP: (!) 128/91 (23 1000)  SpO2: 98 % (23  0827)    VENTILATION STATUS  Resp: 16 (09/11/23 0945)  SpO2: 98 % (09/11/23 0827)           I & O (Last 24H):  Intake/Output Summary (Last 24 hours) at 9/11/2023 1011  Last data filed at 9/11/2023 0353  Gross per 24 hour   Intake 260 ml   Output 0 ml   Net 260 ml       WEIGHTS  Wt Readings from Last 3 Encounters:   09/10/23 2056 54.7 kg (120 lb 9.5 oz)   09/10/23 1055 53.1 kg (117 lb)   09/11/23 0754 54.7 kg (120 lb 9.5 oz)   08/17/23 2249 55.9 kg (123 lb 3.2 oz)   08/17/23 1152 52.6 kg (116 lb)       PHYSICAL EXAM    CONSTITUTIONAL: NAD  HEENT: Normocephalic. No pallor  NECK: no JVD  LUNGS: CTA b/l  HEART: regular rate and rhythm, S1, S2 normal, no murmur   ABDOMEN: soft, non-tender, bowel sounds normal  EXTREMITIES: No edema  SKIN: No rash  NEURO: AAO X 3  PSYCH: normal affect      HOME MEDICATIONS:  No current facility-administered medications on file prior to encounter.     Current Outpatient Medications on File Prior to Encounter   Medication Sig Dispense Refill    amLODIPine (NORVASC) 5 MG tablet Take 1 tablet (5 mg total) by mouth once daily. 30 tablet 1    apixaban (ELIQUIS) 5 mg Tab Take 1 tablet (5 mg total) by mouth 2 (two) times daily. 60 tablet 2    carvediloL (COREG) 25 MG tablet Take 1 tablet (25 mg total) by mouth 2 (two) times daily. 60 tablet 5    FLUoxetine 20 MG capsule Take 1 capsule (20 mg total) by mouth once daily. 30 capsule 5    gabapentin (NEURONTIN) 300 MG capsule Take 2 capsules twice a day by oral route for 90 days. (Patient taking differently: Take 600 mg by mouth 2 (two) times daily.) 360 capsule 1    hydrALAZINE (APRESOLINE) 100 MG tablet Take 1 tablet (100 mg total) by mouth 2 (two) times a day. 60 tablet 2    insulin aspart U-100 (NOVOLOG FLEXPEN U-100 INSULIN) 100 unit/mL (3 mL) InPn pen Inject 8 units 3 times a day by subcutaneous route before meals (Patient taking differently: Inject 8 Units into the skin 3 (three) times daily before meals.) 24 mL 1    insulin detemir U-100,  "Levemir, (LEVEMIR FLEXTOUCH U100 INSULIN) 100 unit/mL (3 mL) InPn pen Inject 21 units every day by subcutaneous route in the evening for 90 days. 15 mL 1    isosorbide mononitrate (IMDUR) 30 MG 24 hr tablet Take 1 tablet (30 mg total) by mouth once daily. 30 tablet 1    levETIRAcetam (KEPPRA) 500 MG Tab Take 1 tablet twice a day by oral route for 30 days. 60 tablet 2    ondansetron (ZOFRAN-ODT) 4 MG TbDL Take 1 tablet (4 mg total) by mouth every 8 (eight) hours as needed. 24 tablet 2    pantoprazole (PROTONIX) 40 MG tablet Take 1 tablet every day by oral route for 30 days. (Patient taking differently: Take 40 mg by mouth once daily.) 30 tablet 2    HYDROcodone-acetaminophen (NORCO) 7.5-325 mg per tablet Take 1 tablet by mouth every 6 (six) hours as needed for Pain. 30 tablet 0    insulin detemir U-100, Levemir, (LEVEMIR FLEXTOUCH U100 INSULIN) 100 unit/mL (3 mL) InPn pen Inject 18 units every day by subcutaneous route in the evening 18 mL 1    lancets 33 gauge Misc Use to test twice daily 100 each 5    ondansetron (ZOFRAN-ODT) 4 MG TbDL Place 1 tablet (4 mg total) under the tongue every 8 (eight) hours. 24 tablet 2    pen needle, diabetic 31 gauge x 3/16" Ndle Use as directed to inject insulin 5 times daily 100 each 11    promethazine (PHENERGAN) 25 MG suppository Place 1 suppository (25 mg total) rectally every 6 (six) hours as needed for Nausea. 10 suppository 0    [DISCONTINUED] atenoloL (TENORMIN) 50 MG tablet Take 1 tablet (50 mg total) by mouth every other day. 45 tablet 3    [DISCONTINUED] omeprazole (PRILOSEC) 20 MG capsule Take 2 capsules (40 mg total) by mouth once daily. for 10 days 20 capsule 0       SCHEDULED MEDS:   amLODIPine  5 mg Oral Daily    apixaban  2.5 mg Oral BID    carvediloL  25 mg Oral BID    [START ON 9/12/2023] epoetin teresa (PROCRIT) injection  100 Units/kg Intravenous Every Tues, Thurs, Sat    FLUoxetine  20 mg Oral Daily    gabapentin  600 mg Oral BID    hydrALAZINE  100 mg Oral BID    " "insulin aspart U-100  8 Units Subcutaneous TID AC    insulin detemir U-100 (Levemir)  21 Units Subcutaneous QHS    isosorbide mononitrate  30 mg Oral Daily    levETIRAcetam  500 mg Oral BID    mupirocin   Nasal BID    pantoprazole  40 mg Oral Daily    polyethylene glycol  17 g Oral Daily       CONTINUOUS INFUSIONS:    PRN MEDS:acetaminophen, dextrose 50%, dextrose 50%, glucagon (human recombinant), glucose, glucose, HYDROmorphone, insulin aspart U-100, magnesium oxide, magnesium oxide, melatonin, naloxone, potassium bicarbonate, potassium bicarbonate, potassium bicarbonate, potassium, sodium phosphates, potassium, sodium phosphates, potassium, sodium phosphates, sodium chloride 0.9%    LABS AND DIAGNOSTICS     CBC LAST 3 DAYS  Recent Labs   Lab 09/10/23  1148 09/11/23  0341   WBC 2.53* 2.98*   RBC 2.42* 2.53*   HGB 7.4* 7.6*   HCT 21.9* 23.5*   MCV 91 93   MCH 30.6 30.0   MCHC 33.8 32.3   RDW 20.4* 19.9*    92*   MPV 11.9 10.9   GRAN 68.7  1.7* 69.6  2.1   LYMPH 20.2  0.5* 20.1  0.6*   MONO 7.5  0.2* 7.4  0.2*   BASO 0.01 0.01   NRBC 0 0       COAGULATION LAST 3 DAYS  No results for input(s): "LABPT", "INR", "APTT" in the last 168 hours.    CHEMISTRY LAST 3 DAYS  Recent Labs   Lab 09/10/23  1453 09/11/23  0340    137   K 3.9 4.5   CL 98 97   CO2 30* 30*   ANIONGAP 10 10   BUN 19 25*   CREATININE 4.2* 5.2*   * 250*   CALCIUM 9.0 8.4*   MG 2.0  --    ALBUMIN 3.5 2.9*   PROT 7.0 6.1   ALKPHOS 223* 197*   ALT 53* 47*   AST 32 39   BILITOT 1.7* 1.2*       CARDIAC PROFILE LAST 3 DAYS  Recent Labs   Lab 09/10/23  1453 09/10/23  1644   CPK 98  --    TROPONINIHS 20.6* 21.4*       ENDOCRINE LAST 3 DAYS  Recent Labs   Lab 09/10/23  1453   TSH 1.800       LAST ARTERIAL BLOOD GAS  ABG  No results for input(s): "PH", "PO2", "PCO2", "HCO3", "BE" in the last 168 hours.    LAST 7 DAYS MICROBIOLOGY   Microbiology Results (last 7 days)       Procedure Component Value Units Date/Time    Blood culture #2 " **CANNOT BE ORDERED STAT** [[id number]] Collected: 09/10/23 1745    Order Status: Completed Specimen: Blood from Peripheral, Right Arm Updated: 09/11/23 0117     Blood Culture, Routine No Growth to date    Blood culture #1 **CANNOT BE ORDERED STAT** [[id number]] Collected: 09/10/23 1745    Order Status: Completed Specimen: Blood from Peripheral, Left Hand Updated: 09/11/23 0117     Blood Culture, Routine No Growth to date    Group A Strep, Molecular [[id number]] Collected: 09/10/23 1344    Order Status: Completed Specimen: Throat Updated: 09/10/23 1450     Group A Strep, Molecular Negative     Comment: Arcanobacterium haemolyticum and Beta Streptococcus group C   and G will not be detected by this test method.  Please order   Throat Culture ([id number]) if suspected.                 MOST RECENT IMAGING  CT Abdomen Pelvis  Without Contrast  CMS MANDATED QUALITY DATA - CT RADIATION  436 All CT scans at this facility utilize dose modulation, iterative reconstruction, and/or weight based dosing when appropriate to reduce radiation dose to as low as reasonably achievable.    CT ABDOMEN PELVIS WITHOUT IV CONTRAST, 9/10/2023 5:22 PM CDT    History:  Epigastric pain.    Comparison: 8/17/2023    Findings:    CT imaging of the abdomen and pelvis was performed without intravenous or oral contrast, utilizing a renal stone protocol. Examination is of limited diagnostic until he for symptoms as questioned, due to the absence of IV and oral contrast.. Unless otherwise stated, incidental findings do not require dedicated follow-up imaging.    The pericardial-cardiac silhouette is enlarged. There is continued pericardial effusion, with the pericardium along the right side heart measuring 2.8 cm maximally in width, worsened from prior. There is patchy bibasilar groundglass type infiltrate.    There is diffuse soft tissue anasarca, and mild diffuse mesenteric edema. There is no bowel obstruction or free air, and no localizing. Clinical  mesenteric inflammatory stranding. There are multifocal arterial abdominal atherosclerotic vascular calcifications. The gallbladder is surgically absent.    No renal, ureteral or bladder calculi are identified.    The terminal ileum and the appendix are normal.    There are no destructive pathologic bone lesions.    No additional acute abdomen or pelvic CT abnormality is identified.    Impression:  1. Enlargement of the pericardial-cardiac silhouette with an increased size pericardial effusion, along the right side heart border measuring 2.8 cm maximally today in width versus previous 1.7 cm.  2. Mild patchy nonspecific bibasilar groundglass infiltrate.  3. Diffuse soft tissue anasarca.  4. No additional acute renal stone CT abnormality.    Electronically signed by:  Maik Holcomb MD  9/10/2023 5:35 PM CDT Workstation: [id number]  X-Ray Chest AP Portable  Chest, single view    HISTORY: Abdomen pain.    Comparison 6/3/2023.    The cardiac silhouette is enlarged but stable. There is no evidence of acute pulmonary vascular engorgement. A dual-lumen right-sided central venous catheter remains in place.    The lungs are appropriately expanded. There are no confluent areas of airspace disease or significant volume loss. No effusion is demonstrated.    IMPRESSION:    Stable cardiomegaly.    No acute cardiopulmonary disease.    Electronically signed by:  Omid Blevins MD  9/10/2023 3:00 PM CDT Workstation: [id number]      ECHOCARDIOGRAM RESULTS (last 5)  Results for orders placed during the hospital encounter of 05/15/23    Echo    Interpretation Summary  · The left ventricle is normal in size with moderate concentric hypertrophy and mildly decreased systolic function.  · The estimated ejection fraction is 47%.  · Left ventricular diastolic dysfunction.  · There is mild left ventricular global hypokinesis.  · Normal right ventricular size with normal right ventricular systolic function.  · Small circumferential with  predominance of posterolateral pericardial effusion. Posterior effusion measuring 1.18 cm.      Results for orders placed during the hospital encounter of 04/22/23    Echo    Interpretation Summary  · The left ventricle is normal in size with moderate concentric hypertrophy and low normal systolic function.  · The estimated ejection fraction is 50%.  · Normal right ventricular size with normal right ventricular systolic function.  · Intermediate central venous pressure (8 mmHg).  · Small circumferential pericardial effusion. Effusion is fluid.  · Patient has small-to-moderate pericardial effusion anteriorly subcostally it is approximally 1.7 cm  · There is no evidence of constriction or tamponade  · No ventricular interdependence  · Posterior fusion smaller than the anterior      Results for orders placed during the hospital encounter of 01/12/23    Echo    Interpretation Summary  · The left ventricle is normal in size with moderate concentric hypertrophy and normal systolic function.  · The estimated ejection fraction is 55%.  · Grade III left ventricular diastolic dysfunction.  · Normal right ventricular size with normal right ventricular systolic function.  · Moderate left atrial enlargement.  · Moderate to severe tricuspid regurgitation.  · Mild to moderate pulmonic regurgitation.  · Mild mitral regurgitation.  · Normal central venous pressure (3 mmHg).  · The estimated PA systolic pressure is 56 mmHg.  · There is pulmonary hypertension.  · Moderate to large circumferential pericardial effusion. No evidence of tamponade.      Results for orders placed during the hospital encounter of 11/19/22    Echo    Interpretation Summary  · The left ventricle is normal in size with concentric remodeling and normal systolic function.  · The estimated ejection fraction is 60%.  · There is right ventricular hypertrophy.  · Normal right ventricular size.  · Small circumferential pericardial effusion. Effusion is fluid.  · There  is no evidence of tamponade or constriction  · Pericardial effusions approximally 1.5 cm posteriorly and over the right heart  · No evidence of ventricular interdependence  · No right atrial or right ventricle collapse  · Fusion may be slightly smaller than seen previously; this was limited echo      Results for orders placed during the hospital encounter of 10/26/22    Echo Saline Bubble? No    Interpretation Summary  · The left ventricular global longitudinal strain is --11.45%%.  · Moderate concentric hypertrophy and moderately decreased systolic function.  · The estimated ejection fraction is 34%.  · Left ventricular diastolic dysfunction.  · Strain study demonstrates apical sparing with normal G LS at the apex a -20 and then in the midportion bases -10- 7  · Strain study is compatible with amyloid but not diagnostic of amyloid      CURRENT/PREVIOUS VISIT EKG  Results for orders placed or performed during the hospital encounter of 09/10/23   EKG 12-lead    Collection Time: 09/10/23  1:59 PM    Narrative    Test Reason : R10.9,    Vent. Rate : 083 BPM     Atrial Rate : 083 BPM     P-R Int : 162 ms          QRS Dur : 086 ms      QT Int : 430 ms       P-R-T Axes : 024 -36 053 degrees     QTc Int : 505 ms    Normal sinus rhythm  Left axis deviation  Minimal voltage criteria for LVH, may be normal variant ( R in aVL )  Possible Anterior infarct ,age undetermined  Prolonged QT  Abnormal ECG  When compared with ECG of 17-AUG-2023 17:55,  Inverted T waves have replaced nonspecific T wave abnormality in Anterior  leads    Referred By: [email or web address]   SELF           Confirmed By:            ASSESSMENT/PLAN:     Active Hospital Problems    Diagnosis    *Abdominal pain with vomiting    GERD (gastroesophageal reflux disease)    MDD (major depressive disorder)    Hypertension    Anemia in ESRD (end-stage renal disease)    Type 2 diabetes mellitus with hyperglycemia, with long-term current use of insulin, previous HbA1c 5.8%     ESRD (end stage renal disease)    Pericardial effusion       ASSESSMENT & PLAN:     Pericardial Effusion  Anemia in ESRD  ESRD on HD  Hypertension        RECOMMENDATIONS:    Patient was admitted for further evaluation of severe abdominal pain with n/v.  CT abdomen/pelvis showed worsening pericardial effusion.   Chronic pericardial effusion with ESRD on HD. ECHO performed that showed small pericardial effusion with no signs of tamponade. EF 50-55% with normal diastolic function.   HD performed today.   Cardiology to sign off.  Thank you for the consultation.       Suzette Braswell NP  Department of Cardiology  Date of Service: 09/11/2023        I have personally interviewed and examined the patient, I have reviewed the Nurse Practitioner's history and physical, assessment, and plan. I agree with the findings and plan.  1. Patient is currently undergoing hemodialysis.    2. She is also receiving 2 units of blood transfusion during the dialysis.    3. Echocardiogram shows small pericardial effusion without any evidence of tamponade.  And compared to the previous echocardiogram it does not seem to have change drastically.  4. Patient is currently undergoing hemodialysis and possibly removing extra fluid today.  5. No significant EKG changes and patient also was asymptomatic today.      Cuong Smith M.D.  Department of Cardiology  Date of Service: 09/11/2023  10:12 AM

## 2023-09-11 NOTE — SUBJECTIVE & OBJECTIVE
Past Medical History:   Diagnosis Date    ESRD on hemodialysis 2022    Gastritis 2022    EGD was 22    Gastroparesis 2022    has not had Emptying study    Heart failure with preserved ejection fraction 2022    EF 55% on 3/22    History of supraventricular tachycardia     Hyperkalemia 2022    Hypertensive emergency 2022    Sickle cell trait 2022    Type 2 diabetes mellitus        Past Surgical History:   Procedure Laterality Date     SECTION      x 3    COLONOSCOPY      COLONOSCOPY N/A 2022    Procedure: COLONOSCOPY;  Surgeon: Jagdeep Cedeno MD;  Location: [facility];  Service: Endoscopy;  Laterality: N/A;    ESOPHAGOGASTRODUODENOSCOPY N/A 10/18/2019    Procedure: ESOPHAGOGASTRODUODENOSCOPY (EGD);  Surgeon: Gianluca Mendez MD;  Location: [facility];  Service: Endoscopy;  Laterality: N/A;    ESOPHAGOGASTRODUODENOSCOPY N/A 2022    Procedure: EGD (ESOPHAGOGASTRODUODENOSCOPY);  Surgeon: Micky Paredes III, MD;  Location: [facility];  Service: Endoscopy;  Laterality: N/A;    ESOPHAGOGASTRODUODENOSCOPY N/A 2022    Procedure: EGD (ESOPHAGOGASTRODUODENOSCOPY);  Surgeon: Marcelo Zhong MD;  Location: [facility];  Service: Endoscopy;  Laterality: N/A;    INSERTION, CATHETER, TUNNELED N/A 2023    Procedure: Insertion,catheter,tunneled;  Surgeon: Carlos Thurman Jr., MD;  Location: [facility] OR;  Service: General;  Laterality: N/A;    LAPAROSCOPIC CHOLECYSTECTOMY N/A 2022    Procedure: CHOLECYSTECTOMY, LAPAROSCOPIC;  Surgeon: Grey Perez MD;  Location: [facility] OR;  Service: General;  Laterality: N/A;    PLACEMENT OF DUAL-LUMEN VASCULAR CATHETER Left 2022    Procedure: INSERTION-CATHETER-JOSEPH;  Surgeon: Dionte Gan MD;  Location: [facility] OR;  Service: General;  Laterality: Left;    PLACEMENT OF DUAL-LUMEN VASCULAR CATHETER Right 2022    Procedure: INSERTION-CATHETER-Hemosplit;  Surgeon: Dionte Gan MD;  Location: [facility];   Service: General;  Laterality: Right;       Review of patient's allergies indicates:   Allergen Reactions    Penicillins Hives       No current facility-administered medications on file prior to encounter.     Current Outpatient Medications on File Prior to Encounter   Medication Sig    amLODIPine (NORVASC) 5 MG tablet Take 1 tablet (5 mg total) by mouth once daily.    apixaban (ELIQUIS) 5 mg Tab Take 1 tablet (5 mg total) by mouth 2 (two) times daily.    carvediloL (COREG) 25 MG tablet Take 1 tablet (25 mg total) by mouth 2 (two) times daily.    FLUoxetine 20 MG capsule Take 1 capsule (20 mg total) by mouth once daily.    gabapentin (NEURONTIN) 300 MG capsule Take 2 capsules twice a day by oral route for 90 days. (Patient taking differently: Take 600 mg by mouth 2 (two) times daily.)    hydrALAZINE (APRESOLINE) 100 MG tablet Take 1 tablet (100 mg total) by mouth 2 (two) times a day.    insulin aspart U-100 (NOVOLOG FLEXPEN U-100 INSULIN) 100 unit/mL (3 mL) InPn pen Inject 8 units 3 times a day by subcutaneous route before meals (Patient taking differently: Inject 8 Units into the skin 3 (three) times daily before meals.)    insulin detemir U-100, Levemir, (LEVEMIR FLEXTOUCH U100 INSULIN) 100 unit/mL (3 mL) InPn pen Inject 21 units every day by subcutaneous route in the evening for 90 days.    isosorbide mononitrate (IMDUR) 30 MG 24 hr tablet Take 1 tablet (30 mg total) by mouth once daily.    levETIRAcetam (KEPPRA) 500 MG Tab Take 1 tablet twice a day by oral route for 30 days.    ondansetron (ZOFRAN-ODT) 4 MG TbDL Take 1 tablet (4 mg total) by mouth every 8 (eight) hours as needed.    pantoprazole (PROTONIX) 40 MG tablet Take 1 tablet every day by oral route for 30 days. (Patient taking differently: Take 40 mg by mouth once daily.)    HYDROcodone-acetaminophen (NORCO) 7.5-325 mg per tablet Take 1 tablet by mouth every 6 (six) hours as needed for Pain.    insulin detemir U-100, Levemir, (LEVEMIR FLEXTOUCH U100  "INSULIN) 100 unit/mL (3 mL) InPn pen Inject 18 units every day by subcutaneous route in the evening    lancets 33 gauge Misc Use to test twice daily    ondansetron (ZOFRAN-ODT) 4 MG TbDL Place 1 tablet (4 mg total) under the tongue every 8 (eight) hours.    pen needle, diabetic 31 gauge x 3/16" Ndle Use as directed to inject insulin 5 times daily    promethazine (PHENERGAN) 25 MG suppository Place 1 suppository (25 mg total) rectally every 6 (six) hours as needed for Nausea.    [DISCONTINUED] atenoloL (TENORMIN) 50 MG tablet Take 1 tablet (50 mg total) by mouth every other day.    [DISCONTINUED] omeprazole (PRILOSEC) 20 MG capsule Take 2 capsules (40 mg total) by mouth once daily. for 10 days     Family History       Problem Relation (Age of Onset)    Diabetes Mother, Father          Tobacco Use    Smoking status: Never    Smokeless tobacco: Never   Substance and Sexual Activity    Alcohol use: Not Currently    Drug use: No    Sexual activity: Not Currently     Partners: Male     Birth control/protection: I.U.D.     Review of Systems   Constitutional: Negative.  Negative for appetite change, chills, fatigue, fever and unexpected weight change.   HENT: Negative.  Negative for congestion, ear pain, hearing loss, rhinorrhea, sore throat and tinnitus.    Eyes: Negative.  Negative for pain, discharge and redness.   Respiratory: Negative.  Negative for cough, shortness of breath, wheezing and stridor.    Cardiovascular: Negative.  Negative for chest pain, palpitations and leg swelling.   Gastrointestinal:  Positive for abdominal pain, nausea and vomiting. Negative for abdominal distention, constipation and diarrhea.   Endocrine: Negative.    Genitourinary: Negative.  Negative for decreased urine volume, difficulty urinating, flank pain, hematuria and urgency.   Musculoskeletal: Negative.  Negative for arthralgias, gait problem and myalgias.   Skin: Negative.  Negative for pallor and rash.   Allergic/Immunologic: Negative. "  Negative for environmental allergies, food allergies and immunocompromised state.   Neurological: Negative.  Negative for dizziness, syncope, facial asymmetry, weakness and headaches.   Hematological: Negative.    Psychiatric/Behavioral: Negative.  Negative for agitation, confusion, self-injury and suicidal ideas.      Objective:     Vital Signs (Most Recent):  Temp: 98.1 °F (36.7 °C) (09/10/23 1055)  Pulse: 80 (09/10/23 1818)  Resp: 18 (09/10/23 1818)  BP: (!) 170/94 (09/10/23 1818)  SpO2: 98 % (09/10/23 1818) Vital Signs (24h Range):  Temp:  [98.1 °F (36.7 °C)] 98.1 °F (36.7 °C)  Pulse:  [80-84] 80  Resp:  [18-20] 18  SpO2:  [98 %-100 %] 98 %  BP: (158-170)/(84-94) 170/94     Weight: 53.1 kg (117 lb)  Body mass index is 21.4 kg/m².     Physical Exam  Vitals reviewed.   Constitutional:       General: She is not in acute distress.     Appearance: Normal appearance. She is normal weight. She is not toxic-appearing.      Comments: Patient very lethargic during exam.   HENT:      Head: Normocephalic and atraumatic.      Nose: Nose normal. No congestion or rhinorrhea.      Mouth/Throat:      Mouth: Mucous membranes are moist.      Pharynx: Oropharynx is clear.   Eyes:      General:         Right eye: No discharge.         Left eye: No discharge.      Extraocular Movements: Extraocular movements intact.      Conjunctiva/sclera: Conjunctivae normal.      Pupils: Pupils are equal, round, and reactive to light.   Cardiovascular:      Rate and Rhythm: Normal rate and regular rhythm.      Pulses: Normal pulses.      Heart sounds: No murmur heard.     No friction rub. No gallop.   Pulmonary:      Effort: Pulmonary effort is normal. No respiratory distress.      Breath sounds: No stridor. No wheezing, rhonchi or rales.   Chest:      Chest wall: No tenderness.   Abdominal:      General: Abdomen is flat. Bowel sounds are normal. There is no distension.      Palpations: Abdomen is soft.      Tenderness: There is no abdominal  tenderness. There is no right CVA tenderness, left CVA tenderness or guarding.   Musculoskeletal:         General: Normal range of motion.      Cervical back: Normal range of motion and neck supple.   Skin:     General: Skin is warm and dry.      Findings: No rash.   Neurological:      General: No focal deficit present.      Mental Status: She is alert and oriented to person, place, and time. Mental status is at baseline.      Motor: No weakness.   Psychiatric:         Mood and Affect: Mood normal.              CRANIAL NERVES     CN III, IV, VI   Pupils are equal, round, and reactive to light.       Significant Labs: All pertinent labs within the past 24 hours have been reviewed.  CBC:   Recent Labs   Lab 09/10/23  1148   WBC 2.53*   HGB 7.4*   HCT 21.9*        CMP:   Recent Labs   Lab 09/10/23  1453      K 3.9   CL 98   CO2 30*   *   BUN 19   CREATININE 4.2*   CALCIUM 9.0   PROT 7.0   ALBUMIN 3.5   BILITOT 1.7*   ALKPHOS 223*   AST 32   ALT 53*   ANIONGAP 10     Lactic Acid:   Recent Labs   Lab 09/10/23  1644   LACTATE 1.1     Lipase:   Recent Labs   Lab 09/10/23  1453   LIPASE 25  22     Magnesium:   Recent Labs   Lab 09/10/23  1453   MG 2.0     Troponin:   Recent Labs   Lab 09/10/23  1453 09/10/23  1644   TROPONINIHS 20.6* 21.4*     TSH:   Recent Labs   Lab 09/10/23  1453   TSH 1.800       Significant Imaging: I have reviewed all pertinent imaging results/findings within the past 24 hours.

## 2023-09-11 NOTE — NURSING
Consent and Hep B status verified, removed 4000 uf net, no issues with catheter, changed dressing, CDI. Patient stable throughout treatment. Patient received 2 units of RBCs on HD. Educated patient on HD treatment. Report given to SHAILA Briscoe      09/11/23 1230   Handoff Report   Received From Eun   Given To Rachna   Vital Signs   Temp 97.8 °F (36.6 °C)   Temp Source Oral   Pulse 69   Heart Rate Source Monitor   Resp 16   SpO2 99 %   Pulse Oximetry Type Intermittent   Device (Oxygen Therapy) room air   /64   BP Location Right arm   BP Method Automatic   Patient Position Lying   Assessments (Pre/Post)   Consent Obtained yes   Safety vein preservation armband present   Date Hepatitis Profile Obtained 02/07/23   Blood Liters Processed (BLP) 59.9   Transport Modality bed   Level of Consciousness (AVPU) alert   Dialyzer Clearance mildly streaked   Pain   Preferred Pain Scale number (Numeric Rating Pain Scale)   Comfort/Acceptable Pain Level 0   Pain Rating (0-10): Rest 0   Pain Rating (0-10): Activity 0   Pain Management Interventions quiet environment facilitated;position adjusted;pillow support provided   Pain/Comfort Interventions   Fever Reduction/Comfort Measures lightweight clothing;lightweight bedding   Pre-Hemodialysis Assessment   Additional Dialysis Information Needed Yes   Patient Status Departed   Treatment Consent Verified Yes   Treatment Status Completed        Hemodialysis Catheter 06/17/23 1135 right internal jugular   Placement Date/Time: 06/17/23 1135   Present Prior to Hospital Arrival?: No  Hand Hygiene: Performed  Barrier Precautions: Performed  Skin Antisepsis: ChloraPrep  Hemodialysis Catheter Type: Tunneled catheter  Location: right internal jugular  Cathete...   Line Necessity Review CRRT/HD   Site Assessment No drainage;No redness;No swelling;No warmth   Line Securement Device Secured with sutures   Dressing Type CHG impregnated dressing/sponge   Dressing Status Clean;Dry;Intact    Dressing Intervention Integrity maintained   Date on Dressing 09/11/23   Dressing Due to be Changed 09/16/23   Venous Patency/Care flushed w/o difficulty;heparin locked   Arterial Patency/Care flushed w/o difficulty;heparin locked   Post-Hemodialysis Assessment   Rinseback Volume (mL) 250 mL   Blood Volume Processed (Liters) 59.9 L   Dialyzer Clearance Lightly streaked   Duration of Treatment 180 minutes   Additional Fluid Intake (mL) 1200 mL   Total UF (mL) 5200 mL   Net Fluid Removal 4000   Patient Response to Treatment lamar   Post-Treatment Weight 50.7 kg (111 lb 12.4 oz)   Treatment Weight Change -4   Post-Hemodialysis Comments stable

## 2023-09-15 LAB
AMPHETAMINES SERPL QL SCN: NEGATIVE NG/ML
BACTERIA BLD CULT: NORMAL
BACTERIA BLD CULT: NORMAL
BARBITURATES SERPL QL SCN: NEGATIVE UG/ML
BENZODIAZ SERPL QL SCN: NEGATIVE NG/ML
BENZODIAZ SERPL QL SCN: NEGATIVE NG/ML
CANNABINOIDS SERPL QL SCN: NEGATIVE NG/ML
METHADONE SERPL QL SCN: NEGATIVE NG/ML
OPIATES SERPL QL SCN: NEGATIVE NG/ML
OXYCODONE+OXYMORPHONE SERPLBLD QL SCN: NEGATIVE NG/ML
PCP SERPL QL SCN: NEGATIVE NG/ML
PROPOXYPH SERPL QL SCN: NEGATIVE NG/ML

## 2023-09-17 ENCOUNTER — HOSPITAL ENCOUNTER (EMERGENCY)
Facility: HOSPITAL | Age: 33
Discharge: HOME | End: 2023-09-17
Attending: EMERGENCY MEDICINE
Payer: MEDICAID

## 2023-09-17 VITALS
WEIGHT: 117 LBS | RESPIRATION RATE: 12 BRPM | DIASTOLIC BLOOD PRESSURE: 134 MMHG | OXYGEN SATURATION: 98 % | BODY MASS INDEX: 21.53 KG/M2 | HEART RATE: 92 BPM | TEMPERATURE: 98 F | HEIGHT: 62 IN | SYSTOLIC BLOOD PRESSURE: 215 MMHG

## 2023-09-17 DIAGNOSIS — R07.9 CHEST PAIN: ICD-10-CM

## 2023-09-17 DIAGNOSIS — R10.84 GENERALIZED ABDOMINAL PAIN: Primary | ICD-10-CM

## 2023-09-17 DIAGNOSIS — Z86.39 HISTORY OF DIABETIC GASTROPARESIS: ICD-10-CM

## 2023-09-17 LAB
ACETONE BLD-MCNC: NEGATIVE MG/DL
ALBUMIN SERPL BCP-MCNC: 3.9 G/DL (ref 3.5–5.2)
ALP SERPL-CCNC: 232 U/L (ref 55–135)
ALT SERPL W/O P-5'-P-CCNC: 28 U/L (ref 10–44)
ANION GAP SERPL CALC-SCNC: 18 MMOL/L (ref 8–16)
AST SERPL-CCNC: 27 U/L (ref 10–40)
BASOPHILS # BLD AUTO: 0.03 K/UL (ref 0–0.2)
BASOPHILS NFR BLD: 0.5 % (ref 0–1.9)
BILIRUB SERPL-MCNC: 1.3 MG/DL (ref 0.1–1)
BUN SERPL-MCNC: 22 MG/DL (ref 6–20)
CALCIUM SERPL-MCNC: 10.3 MG/DL (ref 8.7–10.5)
CHLORIDE SERPL-SCNC: 102 MMOL/L (ref 95–110)
CO2 SERPL-SCNC: 24 MMOL/L (ref 23–29)
CREAT SERPL-MCNC: 4.7 MG/DL (ref 0.5–1.4)
DIFFERENTIAL METHOD: ABNORMAL
EOSINOPHIL # BLD AUTO: 0.1 K/UL (ref 0–0.5)
EOSINOPHIL NFR BLD: 1.9 % (ref 0–8)
ERYTHROCYTE [DISTWIDTH] IN BLOOD BY AUTOMATED COUNT: 17 % (ref 11.5–14.5)
EST. GFR  (NO RACE VARIABLE): 11.8 ML/MIN/1.73 M^2
GLUCOSE SERPL-MCNC: 143 MG/DL (ref 70–110)
HCT VFR BLD AUTO: 42.5 % (ref 37–48.5)
HGB BLD-MCNC: 14.6 G/DL (ref 12–16)
IMM GRANULOCYTES # BLD AUTO: 0.05 K/UL (ref 0–0.04)
IMM GRANULOCYTES NFR BLD AUTO: 0.8 % (ref 0–0.5)
LIPASE SERPL-CCNC: 32 U/L (ref 4–60)
LYMPHOCYTES # BLD AUTO: 0.9 K/UL (ref 1–4.8)
LYMPHOCYTES NFR BLD: 14.6 % (ref 18–48)
MAGNESIUM SERPL-MCNC: 2.2 MG/DL (ref 1.6–2.6)
MCH RBC QN AUTO: 30.2 PG (ref 27–31)
MCHC RBC AUTO-ENTMCNC: 34.4 G/DL (ref 32–36)
MCV RBC AUTO: 88 FL (ref 82–98)
MONOCYTES # BLD AUTO: 0.5 K/UL (ref 0.3–1)
MONOCYTES NFR BLD: 8.7 % (ref 4–15)
NEUTROPHILS # BLD AUTO: 4.3 K/UL (ref 1.8–7.7)
NEUTROPHILS NFR BLD: 73.5 % (ref 38–73)
NRBC BLD-RTO: 1 /100 WBC
PHOSPHATE SERPL-MCNC: 2.9 MG/DL (ref 2.7–4.5)
PLATELET # BLD AUTO: 99 K/UL (ref 150–450)
PMV BLD AUTO: 10 FL (ref 9.2–12.9)
POTASSIUM SERPL-SCNC: 4 MMOL/L (ref 3.5–5.1)
PROT SERPL-MCNC: 8.1 G/DL (ref 6–8.4)
RBC # BLD AUTO: 4.83 M/UL (ref 4–5.4)
SODIUM SERPL-SCNC: 144 MMOL/L (ref 136–145)
WBC # BLD AUTO: 5.89 K/UL (ref 3.9–12.7)

## 2023-09-17 PROCEDURE — 83735 ASSAY OF MAGNESIUM: CPT | Performed by: EMERGENCY MEDICINE

## 2023-09-17 PROCEDURE — 84100 ASSAY OF PHOSPHORUS: CPT | Performed by: EMERGENCY MEDICINE

## 2023-09-17 PROCEDURE — 80053 COMPREHEN METABOLIC PANEL: CPT | Performed by: EMERGENCY MEDICINE

## 2023-09-17 PROCEDURE — 96366 THER/PROPH/DIAG IV INF ADDON: CPT

## 2023-09-17 PROCEDURE — 93005 ELECTROCARDIOGRAM TRACING: CPT

## 2023-09-17 PROCEDURE — 83690 ASSAY OF LIPASE: CPT | Performed by: EMERGENCY MEDICINE

## 2023-09-17 PROCEDURE — 99284 EMERGENCY DEPT VISIT MOD MDM: CPT | Mod: 25

## 2023-09-17 PROCEDURE — 93010 EKG 12-LEAD: ICD-10-PCS | Mod: ,,, | Performed by: INTERNAL MEDICINE

## 2023-09-17 PROCEDURE — 93010 ELECTROCARDIOGRAM REPORT: CPT | Mod: ,,, | Performed by: INTERNAL MEDICINE

## 2023-09-17 PROCEDURE — 25000003 PHARM REV CODE 250: Performed by: EMERGENCY MEDICINE

## 2023-09-17 PROCEDURE — 82009 KETONE BODYS QUAL: CPT | Performed by: EMERGENCY MEDICINE

## 2023-09-17 PROCEDURE — 63600175 PHARM REV CODE 636 W HCPCS: Performed by: EMERGENCY MEDICINE

## 2023-09-17 PROCEDURE — 96376 TX/PRO/DX INJ SAME DRUG ADON: CPT

## 2023-09-17 PROCEDURE — 85025 COMPLETE CBC W/AUTO DIFF WBC: CPT | Performed by: EMERGENCY MEDICINE

## 2023-09-17 PROCEDURE — 96361 HYDRATE IV INFUSION ADD-ON: CPT

## 2023-09-17 PROCEDURE — 96375 TX/PRO/DX INJ NEW DRUG ADDON: CPT

## 2023-09-17 PROCEDURE — 96365 THER/PROPH/DIAG IV INF INIT: CPT

## 2023-09-17 RX ORDER — HYDRALAZINE HYDROCHLORIDE 20 MG/ML
10 INJECTION INTRAMUSCULAR; INTRAVENOUS
Status: COMPLETED | OUTPATIENT
Start: 2023-09-17 | End: 2023-09-17

## 2023-09-17 RX ORDER — METOCLOPRAMIDE HYDROCHLORIDE 5 MG/ML
10 INJECTION INTRAMUSCULAR; INTRAVENOUS
Status: COMPLETED | OUTPATIENT
Start: 2023-09-17 | End: 2023-09-17

## 2023-09-17 RX ORDER — HYDRALAZINE HYDROCHLORIDE 20 MG/ML
20 INJECTION INTRAMUSCULAR; INTRAVENOUS
Status: COMPLETED | OUTPATIENT
Start: 2023-09-17 | End: 2023-09-17

## 2023-09-17 RX ORDER — HALOPERIDOL 5 MG/ML
2 INJECTION INTRAMUSCULAR
Status: COMPLETED | OUTPATIENT
Start: 2023-09-17 | End: 2023-09-17

## 2023-09-17 RX ORDER — HALOPERIDOL 5 MG/ML
5 INJECTION INTRAMUSCULAR
Status: COMPLETED | OUTPATIENT
Start: 2023-09-17 | End: 2023-09-17

## 2023-09-17 RX ADMIN — METOCLOPRAMIDE 10 MG: 5 INJECTION, SOLUTION INTRAMUSCULAR; INTRAVENOUS at 11:09

## 2023-09-17 RX ADMIN — HYDRALAZINE HYDROCHLORIDE 20 MG: 20 INJECTION INTRAMUSCULAR; INTRAVENOUS at 12:09

## 2023-09-17 RX ADMIN — PROMETHAZINE HYDROCHLORIDE 12.5 MG: 25 INJECTION INTRAMUSCULAR; INTRAVENOUS at 02:09

## 2023-09-17 RX ADMIN — HYDRALAZINE HYDROCHLORIDE 10 MG: 20 INJECTION INTRAMUSCULAR; INTRAVENOUS at 02:09

## 2023-09-17 RX ADMIN — PROMETHAZINE HYDROCHLORIDE 12.5 MG: 25 INJECTION INTRAMUSCULAR; INTRAVENOUS at 11:09

## 2023-09-17 RX ADMIN — HALOPERIDOL LACTATE 2 MG: 5 INJECTION, SOLUTION INTRAMUSCULAR at 02:09

## 2023-09-17 RX ADMIN — HALOPERIDOL LACTATE 5 MG: 5 INJECTION, SOLUTION INTRAMUSCULAR at 11:09

## 2023-09-17 RX ADMIN — SODIUM CHLORIDE 500 ML: 9 INJECTION, SOLUTION INTRAVENOUS at 11:09

## 2023-09-17 NOTE — DISCHARGE INSTRUCTIONS
Continue previously prescribed medications and treatments and follow-up with your primary care provider tomorrow.  Also follow-up with your gastroenterologist.  Be sure to go to your regularly scheduled dialysis. Return here as needed or if worse in any way.

## 2023-09-17 NOTE — ED PROVIDER NOTES
Encounter Date: 2023       History     Chief Complaint   Patient presents with    Abdominal Pain     EMS states patient complaining of abdominal pain and vomiting that started today.       33-year-old female, here from home via EMS for evaluation and treatment of severe generalized abdominal pain, nausea, and vomiting.  Symptoms started this morning.  Patient has a history of gastritis and gastroparesis and has been seen here frequently for similar symptoms.  Also history of end-stage renal disease, on dialysis.  Last hemodialysis was yesterday.  She has been unable to take her antihypertensive medications secondary to the nausea and vomiting.  Denies fever or chills and states she felt fine yesterday.  No dark or bloody vomitus, no dark or bloody stool.  Normal bowel movements.  Patient states that she told EMS to take her to [facility], where she goes frequently, but EMS refused and brought her here.      Review of patient's allergies indicates:   Allergen Reactions    Penicillins Hives     Past Medical History:   Diagnosis Date    ESRD on hemodialysis 2022    Gastritis 2022    EGD was 22    Gastroparesis 2022    has not had Emptying study    Heart failure with preserved ejection fraction 2022    EF 55% on 3/22    History of supraventricular tachycardia     Hyperkalemia 2022    Hypertensive emergency 2022    Sickle cell trait 2022    Type 2 diabetes mellitus      Past Surgical History:   Procedure Laterality Date     SECTION      x 3    COLONOSCOPY      COLONOSCOPY N/A 2022    Procedure: COLONOSCOPY;  Surgeon: Jagdeep Cedeno MD;  Location: [facility];  Service: Endoscopy;  Laterality: N/A;    ESOPHAGOGASTRODUODENOSCOPY N/A 10/18/2019    Procedure: ESOPHAGOGASTRODUODENOSCOPY (EGD);  Surgeon: Gianluca Mendez MD;  Location: [facility];  Service: Endoscopy;  Laterality: N/A;    ESOPHAGOGASTRODUODENOSCOPY N/A 2022    Procedure: EGD  (ESOPHAGOGASTRODUODENOSCOPY);  Surgeon: Micky Paredes III, MD;  Location: [facility] ENDO;  Service: Endoscopy;  Laterality: N/A;    ESOPHAGOGASTRODUODENOSCOPY N/A 12/5/2022    Procedure: EGD (ESOPHAGOGASTRODUODENOSCOPY);  Surgeon: Marcelo Zhong MD;  Location: [facility] ENDO;  Service: Endoscopy;  Laterality: N/A;    INSERTION, CATHETER, TUNNELED N/A 6/17/2023    Procedure: Insertion,catheter,tunneled;  Surgeon: Carlos Thurman Jr., MD;  Location: [facility] OR;  Service: General;  Laterality: N/A;    LAPAROSCOPIC CHOLECYSTECTOMY N/A 07/30/2022    Procedure: CHOLECYSTECTOMY, LAPAROSCOPIC;  Surgeon: Grey Perez MD;  Location: [facility] OR;  Service: General;  Laterality: N/A;    PLACEMENT OF DUAL-LUMEN VASCULAR CATHETER Left 07/12/2022    Procedure: INSERTION-CATHETER-JOSEPH;  Surgeon: Dionte Gan MD;  Location: [facility] OR;  Service: General;  Laterality: Left;    PLACEMENT OF DUAL-LUMEN VASCULAR CATHETER Right 07/26/2022    Procedure: INSERTION-CATHETER-Hemosplit;  Surgeon: Dionte Gan MD;  Location: [facility] OR;  Service: General;  Laterality: Right;     Family History   Problem Relation Age of Onset    Diabetes Mother     Diabetes Father      Social History     Tobacco Use    Smoking status: Never    Smokeless tobacco: Never   Substance Use Topics    Alcohol use: Not Currently    Drug use: No     Review of Systems   Constitutional:  Negative for chills and fever.   HENT:  Negative for congestion, rhinorrhea and sore throat.    Eyes:  Negative for photophobia and pain.   Respiratory:  Negative for cough, chest tightness and shortness of breath.    Cardiovascular:  Negative for chest pain and palpitations.   Gastrointestinal:  Positive for abdominal pain, nausea and vomiting. Negative for blood in stool, constipation and diarrhea.   Endocrine: Negative for polydipsia and polyuria.   Genitourinary:  Negative for difficulty urinating, flank pain, frequency and hematuria.   Musculoskeletal:  Negative for back pain,  myalgias, neck pain and neck stiffness.   Skin:  Negative for pallor and rash.   Neurological:  Negative for dizziness, syncope, weakness, light-headedness, numbness and headaches.   Psychiatric/Behavioral:  Negative for confusion. The patient is not nervous/anxious.        Physical Exam     Initial Vitals   BP Pulse Resp Temp SpO2   09/17/23 1115 09/17/23 1109 09/17/23 1109 09/17/23 1109 09/17/23 1109   (!) 185/128 102 (!) 24 97.6 °F (36.4 °C) 100 %      MAP       --                Physical Exam    Nursing note and vitals reviewed.  Constitutional: She appears well-developed and well-nourished. She is not diaphoretic. She appears distressed (Secondary to abdominal pain).   33-year-old female, writhing on the bed, crying, rubbing her abdomen   HENT:   Head: Normocephalic and atraumatic.   Nose: Nose normal.   Mouth/Throat: Oropharynx is clear and moist.   Eyes: EOM are normal. Pupils are equal, round, and reactive to light. No scleral icterus.   Neck: Neck supple. No JVD present.   Normal range of motion.  Cardiovascular:  Normal rate, regular rhythm, normal heart sounds and intact distal pulses.           No murmur heard.  Pulmonary/Chest: Breath sounds normal. No stridor. No respiratory distress. She has no wheezes. She has no rhonchi. She has no rales.   Abdominal: Abdomen is soft. Bowel sounds are normal. She exhibits no distension and no mass. There is abdominal tenderness. There is no rebound and no guarding.   Musculoskeletal:         General: No tenderness or edema. Normal range of motion.      Cervical back: Normal range of motion and neck supple.     Neurological: She is alert and oriented to person, place, and time. She has normal strength. No cranial nerve deficit or sensory deficit. GCS score is 15. GCS eye subscore is 4. GCS verbal subscore is 5. GCS motor subscore is 6.   Skin: Skin is warm and dry. Capillary refill takes less than 2 seconds. No rash noted. No erythema.   Psychiatric:   Anxious,  crying         ED Course   Procedures  Labs Reviewed   CBC W/ AUTO DIFFERENTIAL - Abnormal; Notable for the following components:       Result Value    RDW 17.0 (*)     Platelets 99 (*)     Immature Granulocytes 0.8 (*)     Immature Grans (Abs) 0.05 (*)     Lymph # 0.9 (*)     nRBC 1 (*)     Gran % 73.5 (*)     Lymph % 14.6 (*)     All other components within normal limits   COMPREHENSIVE METABOLIC PANEL - Abnormal; Notable for the following components:    Glucose 143 (*)     BUN 22 (*)     Creatinine 4.7 (*)     Total Bilirubin 1.3 (*)     Alkaline Phosphatase 232 (*)     eGFR 11.8 (*)     Anion Gap 18 (*)     All other components within normal limits   LIPASE   MAGNESIUM   PHOSPHORUS   ACETONE     EKG Readings: (Independently Interpreted)   Me shows normal sinus rhythm at 96 beats per minute.  Possible left atrial enlargement, left anterior fascicular block, evidence for possible anterior infarct of undetermined age, prolonged QT. NE interval 162, .  No ST elevations or depressions.  No significant changes from previous EKG.       Imaging Results    None          Medications   sodium chloride 0.9% bolus 500 mL 500 mL (0 mLs Intravenous Stopped 9/17/23 1304)   promethazine (PHENERGAN) 12.5 mg in dextrose 5 % (D5W) 50 mL IVPB (0 mg Intravenous Stopped 9/17/23 1216)   haloperidol lactate injection 5 mg (5 mg Intravenous Given 9/17/23 1133)   metoclopramide HCl injection 10 mg (10 mg Intravenous Given 9/17/23 1134)   hydrALAZINE injection 20 mg (20 mg Intravenous Given 9/17/23 1211)   promethazine (PHENERGAN) 12.5 mg in dextrose 5 % (D5W) 50 mL IVPB (12.5 mg Intravenous New Bag 9/17/23 1445)   haloperidol lactate injection 2 mg (2 mg Intravenous Given 9/17/23 1445)   hydrALAZINE injection 10 mg (10 mg Intravenous Given 9/17/23 1443)     Medical Decision Making  33-year-old female, history of diabetes mellitus, gastritis, gastroparesis, and end-stage renal disease on dialysis, here for generalized abdominal pain  which started this morning.  Differential includes gastroparesis, gastritis, DKA, electrolyte abnormalities, hypertension, hypertensive urgency etc. Patient seen here frequently for similar complaints, and frequently misses dialysis.  She states she had dialysis yesterday.  Her labs support this, with no significant electrolyte abnormalities.  Patient was given Haldol, Phenergan, and hydralazine here.  Patient did fall asleep for quite some time, and blood pressure was well controlled at that time, but now she is awake, and complaining of pain again.  Will re-dose with medications, and likely discharge.  She does not appear to need urgent dialysis.  Discussed this plan with the patient and she agrees.  I do not believe CT of the abdomen and pelvis is indicated at this time.    Amount and/or Complexity of Data Reviewed  Labs: ordered.    Risk  Prescription drug management.                               Clinical Impression:   Final diagnoses:  [R10.84] Generalized abdominal pain (Primary)  [Z86.39] History of diabetic gastroparesis  [R07.9] Chest pain        ED Disposition Condition    Discharge Stable          ED Prescriptions    None       Follow-up Information       Follow up With Specialties Details Why Contact Info    Melony Kim, NP Nurse Practitioner In 1 day  [address] [place] [id number]  [phone]      [facility] Emergency Dept Emergency Medicine  As needed, If symptoms worsen [address] [facility] [address] [phone]             Ad Berman MD  09/17/23 3422       Ad Berman MD  09/17/23 9377

## 2023-09-19 ENCOUNTER — HOSPITAL ENCOUNTER (OUTPATIENT)
Facility: HOSPITAL | Age: 33
Discharge: HOME | End: 2023-09-20
Attending: EMERGENCY MEDICINE | Admitting: INTERNAL MEDICINE
Payer: MEDICAID

## 2023-09-19 DIAGNOSIS — R06.02 SHORTNESS OF BREATH: ICD-10-CM

## 2023-09-19 DIAGNOSIS — I50.33 ACUTE ON CHRONIC DIASTOLIC (CONGESTIVE) HEART FAILURE: ICD-10-CM

## 2023-09-19 DIAGNOSIS — R10.9 ABDOMINAL PAIN: ICD-10-CM

## 2023-09-19 LAB
ALBUMIN SERPL BCP-MCNC: 3.6 G/DL (ref 3.5–5.2)
ALP SERPL-CCNC: 169 U/L (ref 55–135)
ALT SERPL W/O P-5'-P-CCNC: 18 U/L (ref 10–44)
ANION GAP SERPL CALC-SCNC: 16 MMOL/L (ref 8–16)
AST SERPL-CCNC: 24 U/L (ref 10–40)
B-HCG UR QL: NEGATIVE
BACTERIA #/AREA URNS HPF: NEGATIVE /HPF
BASOPHILS # BLD AUTO: 0.03 K/UL (ref 0–0.2)
BASOPHILS NFR BLD: 0.6 % (ref 0–1.9)
BILIRUB SERPL-MCNC: 1.5 MG/DL (ref 0.1–1)
BILIRUB UR QL STRIP: NEGATIVE
BNP SERPL-MCNC: 1364 PG/ML (ref 0–99)
BUN SERPL-MCNC: 39 MG/DL (ref 6–20)
CALCIUM SERPL-MCNC: 8.2 MG/DL (ref 8.7–10.5)
CHLORIDE SERPL-SCNC: 102 MMOL/L (ref 95–110)
CLARITY UR: CLEAR
CO2 SERPL-SCNC: 19 MMOL/L (ref 23–29)
COLOR UR: YELLOW
CREAT SERPL-MCNC: 6.4 MG/DL (ref 0.5–1.4)
CTP QC/QA: YES
DIFFERENTIAL METHOD: ABNORMAL
EOSINOPHIL # BLD AUTO: 0 K/UL (ref 0–0.5)
EOSINOPHIL NFR BLD: 0.4 % (ref 0–8)
ERYTHROCYTE [DISTWIDTH] IN BLOOD BY AUTOMATED COUNT: 18.3 % (ref 11.5–14.5)
EST. GFR  (NO RACE VARIABLE): 8.2 ML/MIN/1.73 M^2
GLUCOSE SERPL-MCNC: 181 MG/DL (ref 70–110)
GLUCOSE SERPL-MCNC: 296 MG/DL (ref 70–110)
GLUCOSE UR QL STRIP: ABNORMAL
HCT VFR BLD AUTO: 38 % (ref 37–48.5)
HGB BLD-MCNC: 12.6 G/DL (ref 12–16)
HGB UR QL STRIP: ABNORMAL
HYALINE CASTS #/AREA URNS LPF: 1 /LPF
IMM GRANULOCYTES # BLD AUTO: 0.05 K/UL (ref 0–0.04)
IMM GRANULOCYTES NFR BLD AUTO: 1 % (ref 0–0.5)
KETONES UR QL STRIP: ABNORMAL
LEUKOCYTE ESTERASE UR QL STRIP: NEGATIVE
LIPASE SERPL-CCNC: 31 U/L (ref 4–60)
LYMPHOCYTES # BLD AUTO: 0.7 K/UL (ref 1–4.8)
LYMPHOCYTES NFR BLD: 14 % (ref 18–48)
MCH RBC QN AUTO: 30.4 PG (ref 27–31)
MCHC RBC AUTO-ENTMCNC: 33.2 G/DL (ref 32–36)
MCV RBC AUTO: 92 FL (ref 82–98)
MICROSCOPIC COMMENT: ABNORMAL
MONOCYTES # BLD AUTO: 0.4 K/UL (ref 0.3–1)
MONOCYTES NFR BLD: 7.9 % (ref 4–15)
NEUTROPHILS # BLD AUTO: 3.9 K/UL (ref 1.8–7.7)
NEUTROPHILS NFR BLD: 76.1 % (ref 38–73)
NITRITE UR QL STRIP: NEGATIVE
NRBC BLD-RTO: 1 /100 WBC
PH UR STRIP: >8 [PH] (ref 5–8)
PLATELET # BLD AUTO: 73 K/UL (ref 150–450)
PMV BLD AUTO: 9.7 FL (ref 9.2–12.9)
POTASSIUM SERPL-SCNC: 3.7 MMOL/L (ref 3.5–5.1)
PROT SERPL-MCNC: 7.2 G/DL (ref 6–8.4)
PROT UR QL STRIP: ABNORMAL
RBC # BLD AUTO: 4.14 M/UL (ref 4–5.4)
RBC #/AREA URNS HPF: 17 /HPF (ref 0–4)
SODIUM SERPL-SCNC: 137 MMOL/L (ref 136–145)
SP GR UR STRIP: 1.02 (ref 1–1.03)
SQUAMOUS #/AREA URNS HPF: 2 /HPF
TROPONIN I SERPL HS-MCNC: 41.5 PG/ML (ref 0–14.9)
TROPONIN I SERPL HS-MCNC: 41.7 PG/ML (ref 0–14.9)
URN SPEC COLLECT METH UR: ABNORMAL
UROBILINOGEN UR STRIP-ACNC: NEGATIVE EU/DL
WBC # BLD AUTO: 5.06 K/UL (ref 3.9–12.7)
WBC #/AREA URNS HPF: 2 /HPF (ref 0–5)
YEAST URNS QL MICRO: ABNORMAL

## 2023-09-19 PROCEDURE — 99285 EMERGENCY DEPT VISIT HI MDM: CPT | Mod: 25

## 2023-09-19 PROCEDURE — 96365 THER/PROPH/DIAG IV INF INIT: CPT

## 2023-09-19 PROCEDURE — 84484 ASSAY OF TROPONIN QUANT: CPT | Mod: 91 | Performed by: INTERNAL MEDICINE

## 2023-09-19 PROCEDURE — 25000003 PHARM REV CODE 250: Performed by: INTERNAL MEDICINE

## 2023-09-19 PROCEDURE — 81001 URINALYSIS AUTO W/SCOPE: CPT | Performed by: EMERGENCY MEDICINE

## 2023-09-19 PROCEDURE — 85025 COMPLETE CBC W/AUTO DIFF WBC: CPT | Performed by: EMERGENCY MEDICINE

## 2023-09-19 PROCEDURE — 63600175 PHARM REV CODE 636 W HCPCS: Performed by: INTERNAL MEDICINE

## 2023-09-19 PROCEDURE — G0378 HOSPITAL OBSERVATION PER HR: HCPCS

## 2023-09-19 PROCEDURE — 25000003 PHARM REV CODE 250: Performed by: EMERGENCY MEDICINE

## 2023-09-19 PROCEDURE — 63600175 PHARM REV CODE 636 W HCPCS: Performed by: EMERGENCY MEDICINE

## 2023-09-19 PROCEDURE — 87340 HEPATITIS B SURFACE AG IA: CPT | Performed by: INTERNAL MEDICINE

## 2023-09-19 PROCEDURE — 81025 URINE PREGNANCY TEST: CPT | Performed by: EMERGENCY MEDICINE

## 2023-09-19 PROCEDURE — 96375 TX/PRO/DX INJ NEW DRUG ADDON: CPT

## 2023-09-19 PROCEDURE — 84484 ASSAY OF TROPONIN QUANT: CPT | Performed by: EMERGENCY MEDICINE

## 2023-09-19 PROCEDURE — 93010 ELECTROCARDIOGRAM REPORT: CPT | Mod: ,,, | Performed by: SPECIALIST

## 2023-09-19 PROCEDURE — 80053 COMPREHEN METABOLIC PANEL: CPT | Performed by: EMERGENCY MEDICINE

## 2023-09-19 PROCEDURE — 80100016 HC MAINTENANCE HEMODIALYSIS

## 2023-09-19 PROCEDURE — 86706 HEP B SURFACE ANTIBODY: CPT | Performed by: INTERNAL MEDICINE

## 2023-09-19 PROCEDURE — 93010 EKG 12-LEAD: ICD-10-PCS | Mod: ,,, | Performed by: SPECIALIST

## 2023-09-19 PROCEDURE — 36415 COLL VENOUS BLD VENIPUNCTURE: CPT | Performed by: INTERNAL MEDICINE

## 2023-09-19 PROCEDURE — 83690 ASSAY OF LIPASE: CPT | Performed by: EMERGENCY MEDICINE

## 2023-09-19 PROCEDURE — 21400001 HC TELEMETRY ROOM

## 2023-09-19 PROCEDURE — 83880 ASSAY OF NATRIURETIC PEPTIDE: CPT | Performed by: EMERGENCY MEDICINE

## 2023-09-19 PROCEDURE — C9113 INJ PANTOPRAZOLE SODIUM, VIA: HCPCS | Performed by: EMERGENCY MEDICINE

## 2023-09-19 PROCEDURE — 93005 ELECTROCARDIOGRAM TRACING: CPT | Performed by: SPECIALIST

## 2023-09-19 RX ORDER — HYDROMORPHONE HYDROCHLORIDE 1 MG/ML
0.5 INJECTION, SOLUTION INTRAMUSCULAR; INTRAVENOUS; SUBCUTANEOUS EVERY 6 HOURS PRN
Status: DISCONTINUED | OUTPATIENT
Start: 2023-09-19 | End: 2023-09-20

## 2023-09-19 RX ORDER — PANTOPRAZOLE SODIUM 40 MG/10ML
40 INJECTION, POWDER, LYOPHILIZED, FOR SOLUTION INTRAVENOUS
Status: COMPLETED | OUTPATIENT
Start: 2023-09-19 | End: 2023-09-19

## 2023-09-19 RX ORDER — GLUCAGON 1 MG
1 KIT INJECTION
Status: DISCONTINUED | OUTPATIENT
Start: 2023-09-19 | End: 2023-09-20 | Stop reason: HOSPADM

## 2023-09-19 RX ORDER — IBUPROFEN 200 MG
16 TABLET ORAL
Status: DISCONTINUED | OUTPATIENT
Start: 2023-09-19 | End: 2023-09-20 | Stop reason: HOSPADM

## 2023-09-19 RX ORDER — LEVETIRACETAM 500 MG/1
500 TABLET ORAL 2 TIMES DAILY
Status: DISCONTINUED | OUTPATIENT
Start: 2023-09-19 | End: 2023-09-20 | Stop reason: HOSPADM

## 2023-09-19 RX ORDER — PANTOPRAZOLE SODIUM 40 MG/1
40 TABLET, DELAYED RELEASE ORAL
Status: DISCONTINUED | OUTPATIENT
Start: 2023-09-20 | End: 2023-09-20 | Stop reason: HOSPADM

## 2023-09-19 RX ORDER — ASPIRIN 325 MG
325 TABLET ORAL
Status: COMPLETED | OUTPATIENT
Start: 2023-09-19 | End: 2023-09-19

## 2023-09-19 RX ORDER — AMLODIPINE BESYLATE 5 MG/1
5 TABLET ORAL DAILY
Status: DISCONTINUED | OUTPATIENT
Start: 2023-09-20 | End: 2023-09-20 | Stop reason: HOSPADM

## 2023-09-19 RX ORDER — ENOXAPARIN SODIUM 100 MG/ML
30 INJECTION SUBCUTANEOUS EVERY 24 HOURS
Status: DISCONTINUED | OUTPATIENT
Start: 2023-09-19 | End: 2023-09-19

## 2023-09-19 RX ORDER — IBUPROFEN 200 MG
24 TABLET ORAL
Status: DISCONTINUED | OUTPATIENT
Start: 2023-09-19 | End: 2023-09-20 | Stop reason: HOSPADM

## 2023-09-19 RX ORDER — MUPIROCIN 20 MG/G
OINTMENT TOPICAL 2 TIMES DAILY
Status: DISCONTINUED | OUTPATIENT
Start: 2023-09-19 | End: 2023-09-20 | Stop reason: HOSPADM

## 2023-09-19 RX ORDER — ISOSORBIDE MONONITRATE 30 MG/1
30 TABLET, EXTENDED RELEASE ORAL DAILY
Status: DISCONTINUED | OUTPATIENT
Start: 2023-09-20 | End: 2023-09-20 | Stop reason: HOSPADM

## 2023-09-19 RX ORDER — ACETAMINOPHEN 325 MG/1
650 TABLET ORAL EVERY 4 HOURS PRN
Status: DISCONTINUED | OUTPATIENT
Start: 2023-09-19 | End: 2023-09-20 | Stop reason: HOSPADM

## 2023-09-19 RX ORDER — INSULIN ASPART 100 [IU]/ML
8 INJECTION, SOLUTION INTRAVENOUS; SUBCUTANEOUS
Status: DISCONTINUED | OUTPATIENT
Start: 2023-09-19 | End: 2023-09-19

## 2023-09-19 RX ORDER — CARVEDILOL 25 MG/1
25 TABLET ORAL 2 TIMES DAILY
Status: DISCONTINUED | OUTPATIENT
Start: 2023-09-19 | End: 2023-09-20 | Stop reason: HOSPADM

## 2023-09-19 RX ORDER — HYDRALAZINE HYDROCHLORIDE 25 MG/1
100 TABLET, FILM COATED ORAL 2 TIMES DAILY
Status: DISCONTINUED | OUTPATIENT
Start: 2023-09-19 | End: 2023-09-20 | Stop reason: HOSPADM

## 2023-09-19 RX ORDER — MORPHINE SULFATE 4 MG/ML
4 INJECTION, SOLUTION INTRAMUSCULAR; INTRAVENOUS
Status: COMPLETED | OUTPATIENT
Start: 2023-09-19 | End: 2023-09-19

## 2023-09-19 RX ORDER — INSULIN ASPART 100 [IU]/ML
0-10 INJECTION, SOLUTION INTRAVENOUS; SUBCUTANEOUS
Status: DISCONTINUED | OUTPATIENT
Start: 2023-09-19 | End: 2023-09-19

## 2023-09-19 RX ADMIN — HYDRALAZINE HYDROCHLORIDE 100 MG: 25 TABLET ORAL at 08:09

## 2023-09-19 RX ADMIN — ASPIRIN 325 MG ORAL TABLET 325 MG: 325 PILL ORAL at 04:09

## 2023-09-19 RX ADMIN — HYDROMORPHONE HYDROCHLORIDE 0.5 MG: 0.5 INJECTION, SOLUTION INTRAMUSCULAR; INTRAVENOUS; SUBCUTANEOUS at 08:09

## 2023-09-19 RX ADMIN — CARVEDILOL 25 MG: 25 TABLET, FILM COATED ORAL at 08:09

## 2023-09-19 RX ADMIN — PROMETHAZINE HYDROCHLORIDE 12.5 MG: 25 INJECTION INTRAMUSCULAR; INTRAVENOUS at 01:09

## 2023-09-19 RX ADMIN — LEVETIRACETAM 500 MG: 500 TABLET, FILM COATED ORAL at 08:09

## 2023-09-19 RX ADMIN — MORPHINE SULFATE 4 MG: 4 INJECTION, SOLUTION INTRAMUSCULAR; INTRAVENOUS at 01:09

## 2023-09-19 RX ADMIN — PANTOPRAZOLE SODIUM 40 MG: 40 INJECTION, POWDER, FOR SOLUTION INTRAVENOUS at 01:09

## 2023-09-19 NOTE — CONSULTS
INPATIENT NEPHROLOGY CONSULT   [facility]    Patient Name: Tabby Howard  Date: 09/19/2023    Reason for consultation: ESRD    Chief Complaint: Abdominal pain    History of Present Illness:  Patient is a 33-year-old female with ESRD on hemodialysis(T-Th-S),  gastroparesis, seizure, prior C diff infection, DMII, poor vision, sickle cell trait who presents with abdominal pain.  Abdominal pain is generalized and 10/10.  Pain radiates to her back. Pain began during HD today.  Patient says she was unable to complete HD due to the pain- did about 45 min.  Last full HD was on Sat.  Has had associated nausea or vomiting.  No diarrhea.  No fevers.  Denies chest pain or shortness for breath. Consulted for dialysis.    Interval History:  9/19- discussed above with outpt HD unit- labs today reviewed- no acute clearance needs but BP is very high so ordered HD for UF    Plan of Care:    Assessment:  ESRD on HD TTS  HTN  SHPT  Anemia of CKD     Plan:     - ordered HD TTS  - continue home BP meds  - UF 2-3L  - renal diet, 1.5L fluid restriction  - adjust dialysate  - resume binder with meals if taking  - hold SHELLEY with HD today due to high BP    Thank you for allowing us to participate in this patient's care. We will continue to follow.    Vital Signs:  Temp Readings from Last 3 Encounters:   09/19/23 98 °F (36.7 °C) (Oral)   09/17/23 97.6 °F (36.4 °C) (Oral)   09/11/23 98.5 °F (36.9 °C) (Oral)       Pulse Readings from Last 3 Encounters:   09/19/23 87   09/17/23 92   09/11/23 74       BP Readings from Last 3 Encounters:   09/19/23 (!) 178/108   09/17/23 (!) 215/134   09/11/23 123/77       Weight:  Wt Readings from Last 3 Encounters:   09/19/23 53.1 kg (117 lb)   09/17/23 53.1 kg (117 lb)   09/10/23 54.7 kg (120 lb 9.5 oz)       Past Medical & Surgical History:  Past Medical History:   Diagnosis Date    ESRD on hemodialysis 04/12/2022    Gastritis 12/2022    EGD was 12/5/22    Gastroparesis 04/12/2022    has not  had Emptying study    Heart failure with preserved ejection fraction 2022    EF 55% on 3/22    History of supraventricular tachycardia     Hyperkalemia 2022    Hypertensive emergency 2022    Sickle cell trait 2022    Type 2 diabetes mellitus        Past Surgical History:   Procedure Laterality Date     SECTION      x 3    COLONOSCOPY      COLONOSCOPY N/A 2022    Procedure: COLONOSCOPY;  Surgeon: aJgdeep Cedeno MD;  Location: [facility];  Service: Endoscopy;  Laterality: N/A;    ESOPHAGOGASTRODUODENOSCOPY N/A 10/18/2019    Procedure: ESOPHAGOGASTRODUODENOSCOPY (EGD);  Surgeon: Gianluca Mendez MD;  Location: [facility];  Service: Endoscopy;  Laterality: N/A;    ESOPHAGOGASTRODUODENOSCOPY N/A 2022    Procedure: EGD (ESOPHAGOGASTRODUODENOSCOPY);  Surgeon: Micky Paredes III, MD;  Location: [facility];  Service: Endoscopy;  Laterality: N/A;    ESOPHAGOGASTRODUODENOSCOPY N/A 2022    Procedure: EGD (ESOPHAGOGASTRODUODENOSCOPY);  Surgeon: Marcelo Zhong MD;  Location: [facility] ENDO;  Service: Endoscopy;  Laterality: N/A;    INSERTION, CATHETER, TUNNELED N/A 2023    Procedure: Insertion,catheter,tunneled;  Surgeon: Carlos Thurman Jr., MD;  Location: [facility] OR;  Service: General;  Laterality: N/A;    LAPAROSCOPIC CHOLECYSTECTOMY N/A 2022    Procedure: CHOLECYSTECTOMY, LAPAROSCOPIC;  Surgeon: Grey Perez MD;  Location: [facility] OR;  Service: General;  Laterality: N/A;    PLACEMENT OF DUAL-LUMEN VASCULAR CATHETER Left 2022    Procedure: INSERTION-CATHETER-JOSEPH;  Surgeon: Dionte Gan MD;  Location: [facility] OR;  Service: General;  Laterality: Left;    PLACEMENT OF DUAL-LUMEN VASCULAR CATHETER Right 2022    Procedure: INSERTION-CATHETER-Hemosplit;  Surgeon: Dionte Gan MD;  Location: [facility] OR;  Service: General;  Laterality: Right;       Past Social History:  Social History     Socioeconomic History    Marital status:    Tobacco Use     Smoking status: Never    Smokeless tobacco: Never   Substance and Sexual Activity    Alcohol use: Not Currently    Drug use: No    Sexual activity: Not Currently     Partners: Male     Birth control/protection: I.U.D.     Social Determinants of Health     Financial Resource Strain: Low Risk  (5/16/2023)    Overall Financial Resource Strain (CARDIA)     Difficulty of Paying Living Expenses: Not very hard   Food Insecurity: No Food Insecurity (5/16/2023)    Hunger Vital Sign     Worried About Running Out of Food in the Last Year: Never true     Ran Out of Food in the Last Year: Never true   Transportation Needs: No Transportation Needs (5/16/2023)    PRAPARE - Transportation     Lack of Transportation (Medical): No     Lack of Transportation (Non-Medical): No   Physical Activity: Inactive (5/16/2023)    Exercise Vital Sign     Days of Exercise per Week: 0 days     Minutes of Exercise per Session: 0 min   Stress: No Stress Concern Present (5/16/2023)    [employer] [place] [employer] - Occupational Stress Questionnaire     Feeling of Stress : Not at all   Social Connections: Unknown (5/16/2023)    Social Connection and Isolation Panel [NHANES]     Frequency of Communication with Friends and Family: More than three times a week     Frequency of Social Gatherings with Friends and Family: More than three times a week     Attends Worship Services: Never     Active Member of Clubs or Organizations: No     Attends Club or Organization Meetings: Never     Marital Status: Patient refused   Housing Stability: Low Risk  (5/16/2023)    Housing Stability Vital Sign     Unable to Pay for Housing in the Last Year: No     Number of Places Lived in the Last Year: 1     Unstable Housing in the Last Year: No       Medications:  Scheduled Meds:  Continuous Infusions:  PRN Meds:.  No current facility-administered medications on file prior to encounter.     Current Outpatient Medications on File Prior to Encounter   Medication  Sig Dispense Refill    amLODIPine (NORVASC) 5 MG tablet Take 1 tablet (5 mg total) by mouth once daily. 30 tablet 1    apixaban (ELIQUIS) 5 mg Tab Take 1 tablet (5 mg total) by mouth 2 (two) times daily. 60 tablet 2    carvediloL (COREG) 25 MG tablet Take 1 tablet (25 mg total) by mouth 2 (two) times daily. 60 tablet 5    FLUoxetine 20 MG capsule Take 1 capsule (20 mg total) by mouth once daily. 30 capsule 5    gabapentin (NEURONTIN) 300 MG capsule Take 2 capsules twice a day by oral route for 90 days. (Patient taking differently: Take 600 mg by mouth 2 (two) times daily.) 360 capsule 1    hydrALAZINE (APRESOLINE) 100 MG tablet Take 1 tablet (100 mg total) by mouth 2 (two) times a day. 60 tablet 2    insulin aspart U-100 (NOVOLOG FLEXPEN U-100 INSULIN) 100 unit/mL (3 mL) InPn pen Inject 8 units 3 times a day by subcutaneous route before meals (Patient taking differently: Inject 8 Units into the skin 3 (three) times daily before meals.) 24 mL 1    insulin detemir U-100, Levemir, (LEVEMIR FLEXTOUCH U100 INSULIN) 100 unit/mL (3 mL) InPn pen Inject 18 units every day by subcutaneous route in the evening 18 mL 1    isosorbide mononitrate (IMDUR) 30 MG 24 hr tablet Take 1 tablet (30 mg total) by mouth once daily. 30 tablet 1    levETIRAcetam (KEPPRA) 500 MG Tab Take 1 tablet twice a day by oral route for 30 days. (Patient taking differently: Take 500 mg by mouth 2 (two) times daily.) 60 tablet 2    ondansetron (ZOFRAN-ODT) 4 MG TbDL Take 1 tablet (4 mg total) by mouth every 8 (eight) hours as needed. 24 tablet 2    pantoprazole (PROTONIX) 40 MG tablet Take 1 tablet (40 mg total) by mouth once daily.      promethazine (PHENERGAN) 25 MG suppository Place 1 suppository (25 mg total) rectally every 6 (six) hours as needed for Nausea. 10 suppository 0    insulin detemir U-100, Levemir, (LEVEMIR FLEXTOUCH U100 INSULIN) 100 unit/mL (3 mL) InPn pen Inject 21 units every day by subcutaneous route in the evening for 90 days. 15 mL  "1    lancets 33 gauge Misc Use to test twice daily 100 each 5    ondansetron (ZOFRAN-ODT) 4 MG TbDL Place 1 tablet (4 mg total) under the tongue every 8 (eight) hours. 24 tablet 2    pen needle, diabetic 31 gauge x 3/16" Ndle Use as directed to inject insulin 5 times daily 100 each 11    [DISCONTINUED] atenoloL (TENORMIN) 50 MG tablet Take 1 tablet (50 mg total) by mouth every other day. 45 tablet 3    [DISCONTINUED] omeprazole (PRILOSEC) 20 MG capsule Take 2 capsules (40 mg total) by mouth once daily. for 10 days 20 capsule 0       Allergies:  Penicillins    Past Family History:  Reviewed; refer to Hospitalist Admission Note    Review of Systems:  Review of Systems - All 14 systems reviewed and negative, except as noted in HPI    Physical Exam:  General Appearance:    NAD, AAO x 3, cooperative, appears stated age   Head:    Normocephalic, atraumatic   Eyes:    PER, EOMI, and conjunctiva/sclera clear bilaterally       Mouth:   Moist mucus membranes, no thrush or oral lesions,       normal dentition   Back:     No CVA tenderness   Lungs:     Clear to auscultation bilaterally, no wheezes, crackles,           rales or rhonchi, symmetric air movement, respirations unlabored   Chest wall:    No tenderness or deformity   Heart:    Regular rate and rhythm, S1 and S2 normal, no murmur, rub   or gallop   Abdomen:     Soft, non-tender, non-distended, bowel sounds active all four   quadrants, no RT or guarding, no masses, no organomegaly   Extremities:   Warm and well perfused, distal pulses are intact, no             cyanosis or peripheral edema   MSK:   No joint or muscle swelling, tenderness or deformity   Skin:   Skin color, texture, turgor normal, no rashes or lesions   Neurologic/Psychiatric:   CNII-XII intact, normal strength and sensation       throughout, no asterixis; normal affect, memory, judgement     and insight      Results:  Lab Results   Component Value Date     09/19/2023    K 3.7 09/19/2023     " 09/19/2023    CO2 19 (L) 09/19/2023    BUN 39 (H) 09/19/2023    CREATININE 6.4 (H) 09/19/2023    CALCIUM 8.2 (L) 09/19/2023    ANIONGAP 16 09/19/2023    ESTGFRAFRICA 19 (L) 09/03/2022    EGFRNONAA 18 (A) 07/31/2022       Lab Results   Component Value Date    CALCIUM 8.2 (L) 09/19/2023    PHOS 2.9 09/17/2023       Recent Labs   Lab 09/19/23  1308   WBC 5.06   RBC 4.14   HGB 12.6   HCT 38.0   PLT 73*   MCV 92   MCH 30.4   MCHC 33.2       I have personally reviewed pertinent radiological imaging and reports.    I have spent > 70 minutes providing care for this patient for the above diagnoses. These services have included chart/data/imaging review, evaluation, exam, formulation of plan, , note preparation, and discussions with staff involved in this patient's care.    Prateek Clark MD MPH  [facility]  [address] [place] [id number]  [phone] (p)  [phone] (f)

## 2023-09-19 NOTE — Clinical Note
Diagnosis: Abdominal pain [498109]   Future Attending Provider: GENET GARAY [[id number]]   Place in Observation: [facility] [[id number]]   Admitting Provider:: GENET GARAY [[id number]]   Special Needs:: No Special Needs [1]

## 2023-09-19 NOTE — ED PROVIDER NOTES
Encounter Date: 2023       History   No chief complaint on file.    33-year-old female presented emergency department with abdominal pain.  Patient was getting dialysis and started having abdominal pain in the mid abdomen radiating to the back and patient said she could not tolerate dialysis so they stopped dialysis and center here.  Patient had similar abdominal pains in the past.  Denies fever or chills.  Denies any dysuria or hematuria.  Patient has slight nausea along with the pain.  Denies any focal weakness or numbness.      Review of patient's allergies indicates:   Allergen Reactions    Penicillins Hives     Past Medical History:   Diagnosis Date    ESRD on hemodialysis 2022    Gastritis 2022    EGD was 22    Gastroparesis 2022    has not had Emptying study    Heart failure with preserved ejection fraction 2022    EF 55% on 3/22    History of supraventricular tachycardia     Hyperkalemia 2022    Hypertensive emergency 2022    Sickle cell trait 2022    Type 2 diabetes mellitus      Past Surgical History:   Procedure Laterality Date     SECTION      x 3    COLONOSCOPY      COLONOSCOPY N/A 2022    Procedure: COLONOSCOPY;  Surgeon: Jagdeep Cedeno MD;  Location: [facility];  Service: Endoscopy;  Laterality: N/A;    ESOPHAGOGASTRODUODENOSCOPY N/A 10/18/2019    Procedure: ESOPHAGOGASTRODUODENOSCOPY (EGD);  Surgeon: Gianluca Mendez MD;  Location: [facility];  Service: Endoscopy;  Laterality: N/A;    ESOPHAGOGASTRODUODENOSCOPY N/A 2022    Procedure: EGD (ESOPHAGOGASTRODUODENOSCOPY);  Surgeon: Micky Paredes III, MD;  Location: [facility];  Service: Endoscopy;  Laterality: N/A;    ESOPHAGOGASTRODUODENOSCOPY N/A 2022    Procedure: EGD (ESOPHAGOGASTRODUODENOSCOPY);  Surgeon: Marcelo Zhong MD;  Location: [facility];  Service: Endoscopy;  Laterality: N/A;    INSERTION, CATHETER, TUNNELED N/A 2023    Procedure: Insertion,catheter,tunneled;   Surgeon: Carlos Thurman Jr., MD;  Location: [facility] OR;  Service: General;  Laterality: N/A;    LAPAROSCOPIC CHOLECYSTECTOMY N/A 07/30/2022    Procedure: CHOLECYSTECTOMY, LAPAROSCOPIC;  Surgeon: Grey Perez MD;  Location: [facility] OR;  Service: General;  Laterality: N/A;    PLACEMENT OF DUAL-LUMEN VASCULAR CATHETER Left 07/12/2022    Procedure: INSERTION-CATHETER-JOSEPH;  Surgeon: Dionte Gan MD;  Location: [facility] OR;  Service: General;  Laterality: Left;    PLACEMENT OF DUAL-LUMEN VASCULAR CATHETER Right 07/26/2022    Procedure: INSERTION-CATHETER-Hemosplit;  Surgeon: Dionte Gan MD;  Location: [facility] OR;  Service: General;  Laterality: Right;     Family History   Problem Relation Age of Onset    Diabetes Mother     Diabetes Father      Social History     Tobacco Use    Smoking status: Never    Smokeless tobacco: Never   Substance Use Topics    Alcohol use: Not Currently    Drug use: No     Review of Systems   Constitutional: Negative.    HENT: Negative.     Eyes: Negative.    Respiratory: Negative.     Cardiovascular: Negative.  Negative for chest pain.   Gastrointestinal:  Positive for abdominal pain and nausea.   Endocrine: Negative.    Genitourinary: Negative.    Musculoskeletal: Negative.    Skin: Negative.    Allergic/Immunologic: Negative.    Neurological: Negative.    Hematological: Negative.    Psychiatric/Behavioral: Negative.     All other systems reviewed and are negative.      Physical Exam     Initial Vitals [09/19/23 1246]   BP Pulse Resp Temp SpO2   (!) 169/108 87 18 98 °F (36.7 °C) 96 %      MAP       --         Physical Exam    Nursing note and vitals reviewed.  Constitutional: She appears well-developed and well-nourished.   HENT:   Head: Normocephalic and atraumatic.   Nose: Nose normal.   Mouth/Throat: Oropharynx is clear and moist.   Eyes: Conjunctivae and EOM are normal. Pupils are equal, round, and reactive to light.   Neck: Neck supple. No thyromegaly present. No tracheal deviation  present. No JVD present.   Normal range of motion.  Cardiovascular:  Normal rate, regular rhythm, normal heart sounds and intact distal pulses.           No murmur heard.  Pulmonary/Chest: Breath sounds normal. No stridor. No respiratory distress. She has no wheezes. She has no rales.   Abdominal: Abdomen is soft. Bowel sounds are normal. There is abdominal tenderness.   Diffuse mid abdomen tenderness   Musculoskeletal:         General: No edema. Normal range of motion.      Cervical back: Normal range of motion and neck supple.     Neurological: She is alert and oriented to person, place, and time.   Skin: Skin is warm. Capillary refill takes less than 2 seconds.   Psychiatric: She has a normal mood and affect. Thought content normal.         ED Course   Procedures  Labs Reviewed   CBC W/ AUTO DIFFERENTIAL - Abnormal; Notable for the following components:       Result Value    RDW 18.3 (*)     Platelets 73 (*)     Immature Granulocytes 1.0 (*)     Immature Grans (Abs) 0.05 (*)     Lymph # 0.7 (*)     nRBC 1 (*)     Gran % 76.1 (*)     Lymph % 14.0 (*)     All other components within normal limits   COMPREHENSIVE METABOLIC PANEL - Abnormal; Notable for the following components:    CO2 19 (*)     Glucose 296 (*)     BUN 39 (*)     Creatinine 6.4 (*)     Calcium 8.2 (*)     Total Bilirubin 1.5 (*)     Alkaline Phosphatase 169 (*)     eGFR 8.2 (*)     All other components within normal limits   URINALYSIS, REFLEX TO URINE CULTURE - Abnormal; Notable for the following components:    pH, UA >8.0 (*)     Glucose, UA 4+ (*)     Ketones, UA Trace (*)     Occult Blood UA 2+ (*)     All other components within normal limits    Narrative:     Specimen Source->Urine   TROPONIN I HIGH SENSITIVITY - Abnormal; Notable for the following components:    Troponin I High Sensitivity 41.5 (*)     All other components within normal limits   B-TYPE NATRIURETIC PEPTIDE - Abnormal; Notable for the following components:    BNP 1,364 (*)      All other components within normal limits   URINALYSIS MICROSCOPIC - Abnormal; Notable for the following components:    RBC, UA 17 (*)     All other components within normal limits    Narrative:     Specimen Source->Urine   LIPASE   TROPONIN I HIGH SENSITIVITY   HEPATITIS B SURFACE ANTIGEN   HEPATITIS B SURFACE ANTIBODY   TROPONIN I HIGH SENSITIVITY   POCT URINE PREGNANCY     EKG Readings: (Independently Interpreted)   Initial Reading: No STEMI. Rhythm: Normal Sinus Rhythm. Ectopy: No Ectopy. Conduction: Normal.   QT prolongation noted     ECG Results              EKG 12-lead (In process)  Result time 09/19/23 13:54:00      In process by Interface, Lab In [facility] (09/19/23 13:54:00)                   Narrative:    Test Reason : R06.02,    Vent. Rate : 090 BPM     Atrial Rate : 090 BPM     P-R Int : 156 ms          QRS Dur : 088 ms      QT Int : 416 ms       P-R-T Axes : 044 -49 078 degrees     QTc Int : 508 ms    Normal sinus rhythm  Left anterior fascicular block  Anterior infarct (cited on or before 10-SEP-2023)  Prolonged QT  Abnormal ECG  When compared with ECG of 17-SEP-2023 14:54,  No significant change was found    Referred By: [email or web address]   SELF           Confirmed By:                                   Imaging Results              CT Abdomen Pelvis  Without Contrast (Final result)  Result time 09/19/23 15:49:01      Final result by Rae Solano MD (09/19/23 15:49:01)                   Narrative:    CMS MANDATED QUALITY DATA - CT RADIATION - 436    All CT scans at this facility utilize dose modulation, iterative reconstruction, and/or weight based dosing when appropriate to reduce radiation dose to as low as reasonably achievable.    HISTORY: Abdominal pain    COMPARISON: 9/10/2023    FINDINGS: Noncontrast axial images were obtained. Nonenhanced study is tailored for the detection of urolithiasis, and is insensitive for abnormalities of the solid organs, vasculature and hollow viscera.    CT ABDOMEN:  The heart is enlarged. There is a tiny pericardial effusion. There catheter tips at the atriocaval junction. There is prominence of the pulmonary vasculature suggestive of pulmonary congestion. There is dependent atelectasis.      The liver, spleen and pancreas have a normal noncontrast appearance. There is no focal mass or biliary duct dilatation.  Gallbladder is absent  Adrenal glands are normal  Kidneys are symmetric in size without hydronephrosis or calculi.  There is moderate vascular calcification of the aorta and its branches    There are no thick-walled or dilated bowel loops. The appendix is normal.  Aorta is normal in caliber. The musculature is unremarkable. There is mild anasarca.    There is no mesenteric or retroperitoneal adenopathy.  CT PELVIS: The uterus and bladder are normal. There is no free fluid. There is no acute osseous abnormality.    IMPRESSION: Cardiomegaly with tiny pericardial effusion    Prominence of the pulmonary vasculature which may be secondary to congestion    Prior cholecystectomy    No acute abdominal or pelvic process    Electronically signed by:  Rae Solano MD  9/19/2023 3:49 PM CDT Workstation: [id number]                                     X-Ray Chest AP Portable (Final result)  Result time 09/19/23 13:20:08      Final result by Juaquin Will MD (09/19/23 13:20:08)                   Narrative:    Reason: CHF    FINDINGS:    Portable chest with comparison chest x-ray September 10, 2023 show unchanged enlarged cardiomediastinal silhouette. Right internal jugular dialysis catheter is unchanged.  Lungs are clear. Pulmonary vasculature is normal. No acute osseous abnormality.    IMPRESSION:    Unchanged enlarged cardiomediastinal silhouette.    Electronically signed by:  Juaquin Will DO  9/19/2023 1:20 PM CDT Workstation: [id number]                                     Medications   aspirin tablet 325 mg (has no administration in time range)   amLODIPine tablet 5 mg  (has no administration in time range)   apixaban tablet 5 mg (has no administration in time range)   carvediloL tablet 25 mg (has no administration in time range)   hydrALAZINE tablet 100 mg (has no administration in time range)   insulin aspart U-100 pen 8 Units (has no administration in time range)   isosorbide mononitrate 24 hr tablet 30 mg (has no administration in time range)   levETIRAcetam tablet 500 mg (has no administration in time range)   pantoprazole EC tablet 40 mg (has no administration in time range)   acetaminophen tablet 650 mg (has no administration in time range)   glucose chewable tablet 16 g (has no administration in time range)   glucose chewable tablet 24 g (has no administration in time range)   dextrose 50% injection 12.5 g (has no administration in time range)   dextrose 50% injection 25 g (has no administration in time range)   glucagon (human recombinant) injection 1 mg (has no administration in time range)   insulin aspart U-100 pen 0-10 Units (has no administration in time range)   HYDROmorphone injection 0.5 mg (has no administration in time range)   pantoprazole injection 40 mg (40 mg Intravenous Given 9/19/23 1318)   promethazine (PHENERGAN) 12.5 mg in dextrose 5 % (D5W) 50 mL IVPB (0 mg Intravenous Stopped 9/19/23 1442)   morphine injection 4 mg (4 mg Intravenous Given 9/19/23 1319)     Medical Decision Making  Patient with severe abdominal pain.  Patient had to stop dialysis and come here given severe pain.  Patient's pain improved.  CT scan unremarkable.  Patient missed dialysis today as could not be done given the pain.  Patient had improvement of pain.  Troponin is elevated compared to her baseline.  Given patient's presentation screening labs and cardiac workup done.  Pain resolved after treatment.  Patient's troponin is elevated compared to baseline.  Case discussed with Nephrology and as patient missed dialysis nephrologist evaluated patient and want to dialyze patient.   Given elevated troponin and patient's presentation Hospital Medicine consulted for further evaluation to facilitate dialysis and also to trend cardiac enzymes as elevated compared to baseline    Amount and/or Complexity of Data Reviewed  Labs: ordered. Decision-making details documented in ED Course.  Radiology: ordered. Decision-making details documented in ED Course.  ECG/medicine tests: ordered and independent interpretation performed. Decision-making details documented in ED Course.    Risk  OTC drugs.  Prescription drug management.                               Clinical Impression:   Final diagnoses:  [R06.02] Shortness of breath  [R10.9] Abdominal pain        ED Disposition Condition    Observation                 Vinod Esteban MD  09/19/23 2742

## 2023-09-20 VITALS
SYSTOLIC BLOOD PRESSURE: 105 MMHG | RESPIRATION RATE: 16 BRPM | OXYGEN SATURATION: 94 % | DIASTOLIC BLOOD PRESSURE: 77 MMHG | TEMPERATURE: 98 F | BODY MASS INDEX: 19.84 KG/M2 | HEIGHT: 62 IN | WEIGHT: 107.81 LBS | HEART RATE: 84 BPM

## 2023-09-20 LAB
ALBUMIN SERPL BCP-MCNC: 3.8 G/DL (ref 3.5–5.2)
ALP SERPL-CCNC: 112 U/L (ref 55–135)
ALT SERPL W/O P-5'-P-CCNC: 19 U/L (ref 10–44)
ANION GAP SERPL CALC-SCNC: 15 MMOL/L (ref 8–16)
AST SERPL-CCNC: 23 U/L (ref 10–40)
BILIRUB SERPL-MCNC: 2.3 MG/DL (ref 0.1–1)
BUN SERPL-MCNC: 27 MG/DL (ref 6–20)
CALCIUM SERPL-MCNC: 9.3 MG/DL (ref 8.7–10.5)
CHLORIDE SERPL-SCNC: 100 MMOL/L (ref 95–110)
CO2 SERPL-SCNC: 24 MMOL/L (ref 23–29)
CREAT SERPL-MCNC: 5.3 MG/DL (ref 0.5–1.4)
ERYTHROCYTE [DISTWIDTH] IN BLOOD BY AUTOMATED COUNT: 18.3 % (ref 11.5–14.5)
EST. GFR  (NO RACE VARIABLE): 10.2 ML/MIN/1.73 M^2
GLUCOSE SERPL-MCNC: 164 MG/DL (ref 70–110)
HCT VFR BLD AUTO: 38.9 % (ref 37–48.5)
HGB BLD-MCNC: 13 G/DL (ref 12–16)
MCH RBC QN AUTO: 30.7 PG (ref 27–31)
MCHC RBC AUTO-ENTMCNC: 33.4 G/DL (ref 32–36)
MCV RBC AUTO: 92 FL (ref 82–98)
PLATELET # BLD AUTO: 64 K/UL (ref 150–450)
PMV BLD AUTO: 10.4 FL (ref 9.2–12.9)
POTASSIUM SERPL-SCNC: 3.9 MMOL/L (ref 3.5–5.1)
PROT SERPL-MCNC: 7.5 G/DL (ref 6–8.4)
RBC # BLD AUTO: 4.24 M/UL (ref 4–5.4)
SODIUM SERPL-SCNC: 139 MMOL/L (ref 136–145)
TROPONIN I SERPL HS-MCNC: 61.8 PG/ML (ref 0–14.9)
WBC # BLD AUTO: 4.68 K/UL (ref 3.9–12.7)

## 2023-09-20 PROCEDURE — 96376 TX/PRO/DX INJ SAME DRUG ADON: CPT

## 2023-09-20 PROCEDURE — 85027 COMPLETE CBC AUTOMATED: CPT | Performed by: INTERNAL MEDICINE

## 2023-09-20 PROCEDURE — G0378 HOSPITAL OBSERVATION PER HR: HCPCS

## 2023-09-20 PROCEDURE — 84484 ASSAY OF TROPONIN QUANT: CPT | Performed by: INTERNAL MEDICINE

## 2023-09-20 PROCEDURE — 36415 COLL VENOUS BLD VENIPUNCTURE: CPT | Performed by: INTERNAL MEDICINE

## 2023-09-20 PROCEDURE — 80053 COMPREHEN METABOLIC PANEL: CPT | Performed by: INTERNAL MEDICINE

## 2023-09-20 PROCEDURE — 25000003 PHARM REV CODE 250: Performed by: INTERNAL MEDICINE

## 2023-09-20 PROCEDURE — 63600175 PHARM REV CODE 636 W HCPCS: Performed by: INTERNAL MEDICINE

## 2023-09-20 RX ADMIN — ACETAMINOPHEN 650 MG: 325 TABLET ORAL at 12:09

## 2023-09-20 RX ADMIN — HYDROMORPHONE HYDROCHLORIDE 0.5 MG: 0.5 INJECTION, SOLUTION INTRAMUSCULAR; INTRAVENOUS; SUBCUTANEOUS at 06:09

## 2023-09-20 RX ADMIN — CARVEDILOL 25 MG: 25 TABLET, FILM COATED ORAL at 08:09

## 2023-09-20 RX ADMIN — LEVETIRACETAM 500 MG: 500 TABLET, FILM COATED ORAL at 08:09

## 2023-09-20 RX ADMIN — HYDRALAZINE HYDROCHLORIDE 100 MG: 25 TABLET ORAL at 08:09

## 2023-09-20 RX ADMIN — AMLODIPINE BESYLATE 5 MG: 5 TABLET ORAL at 08:09

## 2023-09-20 RX ADMIN — ISOSORBIDE MONONITRATE 30 MG: 30 TABLET, EXTENDED RELEASE ORAL at 08:09

## 2023-09-20 RX ADMIN — PANTOPRAZOLE SODIUM 40 MG: 40 TABLET, DELAYED RELEASE ORAL at 06:09

## 2023-09-20 NOTE — NURSING
0855 patient laying in bed with tele monitor off. Patient refuses to have it placed, MD notified.    1202 call placed to patients parent, Tanya, for D/C. No answer, voicemail left.

## 2023-09-20 NOTE — PROGRESS NOTES
1940 ml net uf removed, pt cried and moaned throughout tx, pt. Requested to stop tx. Dr Clark notified   09/19/23 1940   Required for all Hemodialysis Patients   Hepatitis Status negative   Handoff Report   Received From [facility]   Given To Antonio   Treatment Type   Treatment Type Maintenance   Vital Signs   Temp 98 °F (36.7 °C)   Pulse 92   Resp 20   BP (!) 187/112   Assessments (Pre/Post)   Date Hepatitis Profile Obtained 02/07/23   Blood Liters Processed (BLP) 48   Transport Modality stretcher   Level of Consciousness (AVPU) alert   Dialyzer Clearance moderately streaked   Pain   Preferred Pain Scale number (Numeric Rating Pain Scale)   Pain Rating (0-10): Rest 10        Hemodialysis Catheter 06/17/23 1135 right internal jugular   Placement Date/Time: 06/17/23 1135   Present Prior to Hospital Arrival?: No  Hand Hygiene: Performed  Barrier Precautions: Performed  Skin Antisepsis: ChloraPrep  Hemodialysis Catheter Type: Tunneled catheter  Location: right internal jugular  Cathete...   Line Necessity Review CRRT/HD   Site Assessment No drainage;No redness;No swelling;No warmth   Line Securement Device Secured with sutures   Dressing Type CHG impregnated dressing/sponge;Central line dressing   Dressing Status Clean;Dry;Intact   Dressing Intervention Integrity maintained   Date on Dressing 09/19/23   Dressing Due to be Changed 09/25/23   Venous Patency/Care flushed w/o difficulty;normal saline locked   Arterial Patency/Care flushed w/o difficulty;normal saline locked   Post-Hemodialysis Assessment   Rinseback Volume (mL) 250 mL   Blood Volume Processed (Liters) 48 L   Dialyzer Clearance Moderately streaked   Duration of Treatment 135 minutes   Additional Fluid Intake (mL) 500 mL   Total UF (mL) 2420 mL   Net Fluid Removal 1920   Patient Response to Treatment cried throughout tx   Post-Treatment Weight 51 kg (112 lb 7 oz)   Treatment Weight Change -2.1   Post-Hemodialysis Comments tx completed     Unable to educate due to  behavior

## 2023-09-20 NOTE — H&P
[facility] Medicine  History & Physical    Patient Name: Tabby Howard  MRN: [id number]  Patient Class: IP- Inpatient  Admission Date: 2023  Attending Physician: Alfonso Garsia MD   Primary Care Provider: Melony Kim NP         Patient information was obtained from patient, past medical records and ER records.     Subjective:     Principal Problem:Abdominal pain    Chief Complaint: No chief complaint on file.       HPI: 33 year old patient again  getting admitted with abdominal pain   Pt was at HD center for dialysis and started having abdominal pain  Pain all over , worse with movements  Pt was asking for iv dilaudid to relieve the pain   No other issues        Past Medical History:   Diagnosis Date    ESRD on hemodialysis 2022    Gastritis 2022    EGD was 22    Gastroparesis 2022    has not had Emptying study    Heart failure with preserved ejection fraction 2022    EF 55% on 3/22    History of supraventricular tachycardia     Hyperkalemia 2022    Hypertensive emergency 2022    Sickle cell trait 2022    Type 2 diabetes mellitus        Past Surgical History:   Procedure Laterality Date     SECTION      x 3    COLONOSCOPY      COLONOSCOPY N/A 2022    Procedure: COLONOSCOPY;  Surgeon: Jagdeep Cedeno MD;  Location: [facility];  Service: Endoscopy;  Laterality: N/A;    ESOPHAGOGASTRODUODENOSCOPY N/A 10/18/2019    Procedure: ESOPHAGOGASTRODUODENOSCOPY (EGD);  Surgeon: Gianluca Mendez MD;  Location: [facility];  Service: Endoscopy;  Laterality: N/A;    ESOPHAGOGASTRODUODENOSCOPY N/A 2022    Procedure: EGD (ESOPHAGOGASTRODUODENOSCOPY);  Surgeon: Micky Paredes III, MD;  Location: [facility];  Service: Endoscopy;  Laterality: N/A;    ESOPHAGOGASTRODUODENOSCOPY N/A 2022    Procedure: EGD (ESOPHAGOGASTRODUODENOSCOPY);  Surgeon: Marcelo Zhong MD;  Location: [facility];  Service: Endoscopy;  Laterality:  N/A;    INSERTION, CATHETER, TUNNELED N/A 6/17/2023    Procedure: Insertion,catheter,tunneled;  Surgeon: Carlos Thurman Jr., MD;  Location: [facility] OR;  Service: General;  Laterality: N/A;    LAPAROSCOPIC CHOLECYSTECTOMY N/A 07/30/2022    Procedure: CHOLECYSTECTOMY, LAPAROSCOPIC;  Surgeon: Grey Perez MD;  Location: [facility];  Service: General;  Laterality: N/A;    PLACEMENT OF DUAL-LUMEN VASCULAR CATHETER Left 07/12/2022    Procedure: INSERTION-CATHETER-JOSEPH;  Surgeon: Dionte Gan MD;  Location: [facility];  Service: General;  Laterality: Left;    PLACEMENT OF DUAL-LUMEN VASCULAR CATHETER Right 07/26/2022    Procedure: INSERTION-CATHETER-Hemosplit;  Surgeon: Dionte Gan MD;  Location: [facility] OR;  Service: General;  Laterality: Right;       Review of patient's allergies indicates:   Allergen Reactions    Penicillins Hives       No current facility-administered medications on file prior to encounter.     Current Outpatient Medications on File Prior to Encounter   Medication Sig    amLODIPine (NORVASC) 5 MG tablet Take 1 tablet (5 mg total) by mouth once daily.    apixaban (ELIQUIS) 5 mg Tab Take 1 tablet (5 mg total) by mouth 2 (two) times daily.    carvediloL (COREG) 25 MG tablet Take 1 tablet (25 mg total) by mouth 2 (two) times daily.    FLUoxetine 20 MG capsule Take 1 capsule (20 mg total) by mouth once daily.    gabapentin (NEURONTIN) 300 MG capsule Take 2 capsules twice a day by oral route for 90 days. (Patient taking differently: Take 600 mg by mouth 2 (two) times daily.)    hydrALAZINE (APRESOLINE) 100 MG tablet Take 1 tablet (100 mg total) by mouth 2 (two) times a day.    insulin aspart U-100 (NOVOLOG FLEXPEN U-100 INSULIN) 100 unit/mL (3 mL) InPn pen Inject 8 units 3 times a day by subcutaneous route before meals (Patient taking differently: Inject 8 Units into the skin 3 (three) times daily before meals.)    insulin detemir U-100, Levemir, (LEVEMIR FLEXTOUCH U100 INSULIN) 100  "unit/mL (3 mL) InPn pen Inject 18 units every day by subcutaneous route in the evening    isosorbide mononitrate (IMDUR) 30 MG 24 hr tablet Take 1 tablet (30 mg total) by mouth once daily.    levETIRAcetam (KEPPRA) 500 MG Tab Take 1 tablet twice a day by oral route for 30 days. (Patient taking differently: Take 500 mg by mouth 2 (two) times daily.)    ondansetron (ZOFRAN-ODT) 4 MG TbDL Take 1 tablet (4 mg total) by mouth every 8 (eight) hours as needed.    pantoprazole (PROTONIX) 40 MG tablet Take 1 tablet (40 mg total) by mouth once daily.    promethazine (PHENERGAN) 25 MG suppository Place 1 suppository (25 mg total) rectally every 6 (six) hours as needed for Nausea.    insulin detemir U-100, Levemir, (LEVEMIR FLEXTOUCH U100 INSULIN) 100 unit/mL (3 mL) InPn pen Inject 21 units every day by subcutaneous route in the evening for 90 days.    lancets 33 gauge Misc Use to test twice daily    ondansetron (ZOFRAN-ODT) 4 MG TbDL Place 1 tablet (4 mg total) under the tongue every 8 (eight) hours.    pen needle, diabetic 31 gauge x 3/16" Ndle Use as directed to inject insulin 5 times daily    [DISCONTINUED] atenoloL (TENORMIN) 50 MG tablet Take 1 tablet (50 mg total) by mouth every other day.    [DISCONTINUED] omeprazole (PRILOSEC) 20 MG capsule Take 2 capsules (40 mg total) by mouth once daily. for 10 days     Family History       Problem Relation (Age of Onset)    Diabetes Mother, Father          Tobacco Use    Smoking status: Never    Smokeless tobacco: Never   Substance and Sexual Activity    Alcohol use: Not Currently    Drug use: No    Sexual activity: Not Currently     Partners: Male     Birth control/protection: I.U.D.     Review of Systems   Constitutional:  Negative for activity change and appetite change.   HENT:  Negative for congestion and dental problem.    Eyes:  Negative for discharge and itching.   Respiratory:  Negative for shortness of breath.    Cardiovascular:  Negative for chest pain. "   Gastrointestinal:  Positive for abdominal pain. Negative for abdominal distention.   Endocrine: Negative for cold intolerance.   Genitourinary:  Negative for difficulty urinating and dysuria.   Musculoskeletal:  Negative for arthralgias and back pain.   Skin:  Negative for color change.   Neurological:  Negative for dizziness and facial asymmetry.   Hematological:  Negative for adenopathy.   Psychiatric/Behavioral:  Negative for agitation and behavioral problems.      Objective:     Vital Signs (Most Recent):  Temp: 97.3 °F (36.3 °C) (09/19/23 2021)  Pulse: 91 (09/19/23 2021)  Resp: 18 (09/19/23 2027)  BP: (!) 190/120 (09/19/23 2021)  SpO2: 100 % (09/19/23 2021) Vital Signs (24h Range):  Temp:  [97.3 °F (36.3 °C)-98 °F (36.7 °C)] 97.3 °F (36.3 °C)  Pulse:  [85-94] 91  Resp:  [18-22] 18  SpO2:  [95 %-100 %] 100 %  BP: (125-190)/() 190/120     Weight: 48.9 kg (107 lb 12.9 oz)  Body mass index is 19.72 kg/m².     Physical Exam  Vitals and nursing note reviewed.   Constitutional:       General: She is not in acute distress.  HENT:      Head: Atraumatic.      Right Ear: External ear normal.      Left Ear: External ear normal.      Nose: Nose normal.      Mouth/Throat:      Mouth: Mucous membranes are moist.   Cardiovascular:      Rate and Rhythm: Normal rate.   Pulmonary:      Effort: Pulmonary effort is normal.   Abdominal:      Palpations: Abdomen is soft.   Musculoskeletal:         General: Normal range of motion.      Cervical back: Normal range of motion.   Skin:     General: Skin is warm.   Neurological:      Mental Status: She is alert and oriented to person, place, and time.   Psychiatric:         Behavior: Behavior normal.                Significant Labs: All pertinent labs within the past 24 hours have been reviewed.  CBC:   Recent Labs   Lab 09/19/23  1308   WBC 5.06   HGB 12.6   HCT 38.0   PLT 73*     CMP:   Recent Labs   Lab 09/19/23  1308      K 3.7      CO2 19*   *   BUN 39*    CREATININE 6.4*   CALCIUM 8.2*   PROT 7.2   ALBUMIN 3.6   BILITOT 1.5*   ALKPHOS 169*   AST 24   ALT 18   ANIONGAP 16       Significant Imaging: I have reviewed all pertinent imaging results/findings within the past 24 hours.      Assessment/Plan:     * Abdominal pain  Pt gain getting admitted with abdominal  pain   Mostly narcotic bowel syndrome in the background of pain med seeking behavior  She is diabetic but her HbA1C levels has been normal whenever checked this year so unlikely for gastroparesis      Frequent hospital admissions  In 2023 alone  She had 17 IP admissions  16 CT abdomen/pelvis  CTA chest x 3  22 CXR  6 KUB    She will most likely develop radiation associated complications in near future      Type 2 diabetes mellitus with hyperglycemia, with long-term current use of insulin, previous HbA1c 5.8%  HbA1C whenever checked this year was normal  I didn't started her on any insulin regime  Ideally she should be not on any insulin regime because pt had admissions before due to hypothermia stemming from hypoglycemia      Drug-seeking behavior  Only interested in iv dilaudid shots      Deep vein thrombosis (DVT) of non-extremity vein  She is on eliquis for this  Will hold this in view with thrombocytopenia      ESRD (end stage renal disease)  HD as per Nephro team  Once she had HD pt can go home        VTE Risk Mitigation (From admission, onward)         Ordered     IP VTE HIGH RISK PATIENT  Once         09/19/23 1642     Place sequential compression device  Until discontinued         09/19/23 1642                           Alfonso Garsia MD  Department of Hospital Medicine  [facility]

## 2023-09-20 NOTE — HPI
33 year old patient again  getting admitted with abdominal pain   Pt was at HD center for dialysis and started having abdominal pain  Pain all over , worse with movements  Pt was asking for iv dilaudid to relieve the pain   No other issues

## 2023-09-20 NOTE — PROGRESS NOTES
INPATIENT NEPHROLOGY Progress Note  [facility]    Patient Name: Tabby Howard  Date: 09/20/2023    Reason for consultation: ESRD    Chief Complaint: Abdominal pain    History of Present Illness:  Patient is a 33-year-old female with ESRD on hemodialysis(T-Th-S),  gastroparesis, seizure, prior C diff infection, DMII, poor vision, sickle cell trait who presents with abdominal pain.  Abdominal pain is generalized and 10/10.  Pain radiates to her back. Pain began during HD today.  Patient says she was unable to complete HD due to the pain- did about 45 min.  Last full HD was on Sat.  Has had associated nausea or vomiting.  No diarrhea.  No fevers.  Denies chest pain or shortness for breath. Consulted for dialysis.    Interval History:  9/19- discussed above with outpt HD unit- labs today reviewed- no acute clearance needs but BP is very high so ordered HD for UF  9/20- patient terminated treatment after 2 hours due to pain, net UF 1.9L, BP better today    Plan of Care:    Assessment:  ESRD on HD TTS  HTN  SHPT  Anemia of CKD     Plan:     - ordered HD TTS  - continue home BP meds  - UF 2-3L  - renal diet, 1.5L fluid restriction  - adjust dialysate  - resume binder with meals if taking  - resume [facility] with HD    Thank you for allowing us to participate in this patient's care. We will continue to follow.    Vital Signs:  Temp Readings from Last 3 Encounters:   09/20/23 98.2 °F (36.8 °C)   09/17/23 97.6 °F (36.4 °C) (Oral)   09/11/23 98.5 °F (36.9 °C) (Oral)       Pulse Readings from Last 3 Encounters:   09/20/23 81   09/17/23 92   09/11/23 74       BP Readings from Last 3 Encounters:   09/20/23 (!) 163/98   09/17/23 (!) 215/134   09/11/23 123/77       Weight:  Wt Readings from Last 3 Encounters:   09/19/23 48.9 kg (107 lb 12.9 oz)   09/17/23 53.1 kg (117 lb)   09/10/23 54.7 kg (120 lb 9.5 oz)       Medications:  Scheduled Meds:  Continuous Infusions:  PRN Meds:.  No current facility-administered  medications on file prior to encounter.     Current Outpatient Medications on File Prior to Encounter   Medication Sig Dispense Refill    amLODIPine (NORVASC) 5 MG tablet Take 1 tablet (5 mg total) by mouth once daily. 30 tablet 1    apixaban (ELIQUIS) 5 mg Tab Take 1 tablet (5 mg total) by mouth 2 (two) times daily. 60 tablet 2    carvediloL (COREG) 25 MG tablet Take 1 tablet (25 mg total) by mouth 2 (two) times daily. 60 tablet 5    FLUoxetine 20 MG capsule Take 1 capsule (20 mg total) by mouth once daily. 30 capsule 5    gabapentin (NEURONTIN) 300 MG capsule Take 2 capsules twice a day by oral route for 90 days. (Patient taking differently: Take 600 mg by mouth 2 (two) times daily.) 360 capsule 1    hydrALAZINE (APRESOLINE) 100 MG tablet Take 1 tablet (100 mg total) by mouth 2 (two) times a day. 60 tablet 2    insulin aspart U-100 (NOVOLOG FLEXPEN U-100 INSULIN) 100 unit/mL (3 mL) InPn pen Inject 8 units 3 times a day by subcutaneous route before meals (Patient taking differently: Inject 8 Units into the skin 3 (three) times daily before meals.) 24 mL 1    insulin detemir U-100, Levemir, (LEVEMIR FLEXTOUCH U100 INSULIN) 100 unit/mL (3 mL) InPn pen Inject 18 units every day by subcutaneous route in the evening 18 mL 1    isosorbide mononitrate (IMDUR) 30 MG 24 hr tablet Take 1 tablet (30 mg total) by mouth once daily. 30 tablet 1    levETIRAcetam (KEPPRA) 500 MG Tab Take 1 tablet twice a day by oral route for 30 days. (Patient taking differently: Take 500 mg by mouth 2 (two) times daily.) 60 tablet 2    ondansetron (ZOFRAN-ODT) 4 MG TbDL Take 1 tablet (4 mg total) by mouth every 8 (eight) hours as needed. 24 tablet 2    pantoprazole (PROTONIX) 40 MG tablet Take 1 tablet (40 mg total) by mouth once daily.      promethazine (PHENERGAN) 25 MG suppository Place 1 suppository (25 mg total) rectally every 6 (six) hours as needed for Nausea. 10 suppository 0    insulin detemir U-100, Levemir, (LEVEMIR FLEXTOUCH U100  "INSULIN) 100 unit/mL (3 mL) InPn pen Inject 21 units every day by subcutaneous route in the evening for 90 days. 15 mL 1    lancets 33 gauge Misc Use to test twice daily 100 each 5    ondansetron (ZOFRAN-ODT) 4 MG TbDL Place 1 tablet (4 mg total) under the tongue every 8 (eight) hours. 24 tablet 2    pen needle, diabetic 31 gauge x 3/16" Ndle Use as directed to inject insulin 5 times daily 100 each 11    [DISCONTINUED] atenoloL (TENORMIN) 50 MG tablet Take 1 tablet (50 mg total) by mouth every other day. 45 tablet 3    [DISCONTINUED] omeprazole (PRILOSEC) 20 MG capsule Take 2 capsules (40 mg total) by mouth once daily. for 10 days 20 capsule 0     Review of Systems:  Neg    Physical Exam:  General Appearance:    NAD, AAO x 3, cooperative, appears stated age   Head:    Normocephalic, atraumatic   Eyes:    PER, EOMI, and conjunctiva/sclera clear bilaterally       Mouth:   Moist mucus membranes, no thrush or oral lesions,       normal dentition   Back:     No CVA tenderness   Lungs:     Clear to auscultation bilaterally, no wheezes, crackles,           rales or rhonchi, symmetric air movement, respirations unlabored   Chest wall:    No tenderness or deformity   Heart:    Regular rate and rhythm, S1 and S2 normal, no murmur, rub   or gallop   Abdomen:     Soft, non-tender, non-distended, bowel sounds active all four   quadrants, no RT or guarding, no masses, no organomegaly   Extremities:   Warm and well perfused, distal pulses are intact, no             cyanosis or peripheral edema   MSK:   No joint or muscle swelling, tenderness or deformity   Skin:   Skin color, texture, turgor normal, no rashes or lesions   Neurologic/Psychiatric:   CNII-XII intact, normal strength and sensation       throughout, no asterixis; normal affect, memory, judgement     and insight      Results:  Lab Results   Component Value Date     09/20/2023    K 3.9 09/20/2023     09/20/2023    CO2 24 09/20/2023    BUN 27 (H) 09/20/2023    " CREATININE 5.3 (H) 09/20/2023    CALCIUM 9.3 09/20/2023    ANIONGAP 15 09/20/2023    ESTGFRAFRICA 19 (L) 09/03/2022    EGFRNONAA 18 (A) 07/31/2022       Lab Results   Component Value Date    CALCIUM 9.3 09/20/2023    PHOS 2.9 09/17/2023       Recent Labs   Lab 09/20/23  0409   WBC 4.68   RBC 4.24   HGB 13.0   HCT 38.9   PLT 64*   MCV 92   MCH 30.7   MCHC 33.4         I have personally reviewed pertinent radiological imaging and reports.    I have spent > 35 minutes providing care for this patient for the above diagnoses. These services have included chart/data/imaging review, evaluation, exam, formulation of plan, , note preparation, and discussions with staff involved in this patient's care.    Prateek Clark MD MPH  [facility] [place]  [address] [place] [id number]  [phone] (p)  [phone] (f)

## 2023-09-20 NOTE — ASSESSMENT & PLAN NOTE
In 2023 alone  She had 17 IP admissions  16 CT abdomen/pelvis  CTA chest x 3  22 CXR  6 KUB    She will most likely develop radiation associated complications in near future

## 2023-09-20 NOTE — NURSING
IV and tele removed. Patient received D/C education verbally as well as written. Patient D/C home with family. Belongings gathered and sent with patient. Transported to personal vehicle by staff in wheelchair.

## 2023-09-20 NOTE — ASSESSMENT & PLAN NOTE
Pt gain getting admitted with abdominal  pain   Mostly narcotic bowel syndrome in the background of pain med seeking behavior  She is diabetic but her HbA1C levels has been normal whenever checked this year so unlikely for gastroparesis     20-Nov-2018 01:18

## 2023-09-20 NOTE — ASSESSMENT & PLAN NOTE
HbA1C whenever checked this year was normal  I didn't started her on any insulin regime  Ideally she should be not on any insulin regime because pt had admissions before due to hypothermia stemming from hypoglycemia

## 2023-09-20 NOTE — PLAN OF CARE
Pcp appt added to avs  Pt is clear for DC from          09/20/23 1136   Final Note   Assessment Type Final Discharge Note   Anticipated Discharge Disposition Home   Hospital Resources/Appts/Education Provided Appointments scheduled and added to AVS

## 2023-09-20 NOTE — PLAN OF CARE
Problem: Device-Related Complication Risk (Hemodialysis)  Goal: Safe, Effective Therapy Delivery  Outcome: Ongoing, Progressing     Problem: Hemodynamic Instability (Hemodialysis)  Goal: Effective Tissue Perfusion  Outcome: Ongoing, Progressing     Problem: Infection (Hemodialysis)  Goal: Absence of Infection Signs and Symptoms  Outcome: Ongoing, Progressing     Problem: Adult Inpatient Plan of Care  Goal: Plan of Care Review  Outcome: Ongoing, Progressing  Goal: Patient-Specific Goal (Individualized)  Outcome: Ongoing, Progressing  Goal: Absence of Hospital-Acquired Illness or Injury  Outcome: Ongoing, Progressing  Goal: Optimal Comfort and Wellbeing  Outcome: Ongoing, Progressing  Goal: Readiness for Transition of Care  Outcome: Ongoing, Progressing     Problem: Diabetes Comorbidity  Goal: Blood Glucose Level Within Targeted Range  Outcome: Ongoing, Progressing     Problem: Infection  Goal: Absence of Infection Signs and Symptoms  Outcome: Ongoing, Progressing     Problem: Impaired Wound Healing  Goal: Optimal Wound Healing  Outcome: Ongoing, Progressing     Problem: Fall Injury Risk  Goal: Absence of Fall and Fall-Related Injury  Outcome: Ongoing, Progressing

## 2023-09-20 NOTE — SUBJECTIVE & OBJECTIVE
Past Medical History:   Diagnosis Date    ESRD on hemodialysis 2022    Gastritis 2022    EGD was 22    Gastroparesis 2022    has not had Emptying study    Heart failure with preserved ejection fraction 2022    EF 55% on 3/22    History of supraventricular tachycardia     Hyperkalemia 2022    Hypertensive emergency 2022    Sickle cell trait 2022    Type 2 diabetes mellitus        Past Surgical History:   Procedure Laterality Date     SECTION      x 3    COLONOSCOPY      COLONOSCOPY N/A 2022    Procedure: COLONOSCOPY;  Surgeon: Jagdeep Cedeno MD;  Location: [facility];  Service: Endoscopy;  Laterality: N/A;    ESOPHAGOGASTRODUODENOSCOPY N/A 10/18/2019    Procedure: ESOPHAGOGASTRODUODENOSCOPY (EGD);  Surgeon: Gianluca Mendez MD;  Location: [facility];  Service: Endoscopy;  Laterality: N/A;    ESOPHAGOGASTRODUODENOSCOPY N/A 2022    Procedure: EGD (ESOPHAGOGASTRODUODENOSCOPY);  Surgeon: Micky Paredes III, MD;  Location: [facility];  Service: Endoscopy;  Laterality: N/A;    ESOPHAGOGASTRODUODENOSCOPY N/A 2022    Procedure: EGD (ESOPHAGOGASTRODUODENOSCOPY);  Surgeon: Marcelo Zhong MD;  Location[facility];  Service: Endoscopy;  Laterality: N/A;    INSERTION, CATHETER, TUNNELED N/A 2023    Procedure: Insertion,catheter,tunneled;  Surgeon: Carlos Thurman Jr., MD;  Location: [facility] OR;  Service: General;  Laterality: N/A;    LAPAROSCOPIC CHOLECYSTECTOMY N/A 2022    Procedure: CHOLECYSTECTOMY, LAPAROSCOPIC;  Surgeon: Grey Perez MD;  Location: [facility] OR;  Service: General;  Laterality: N/A;    PLACEMENT OF DUAL-LUMEN VASCULAR CATHETER Left 2022    Procedure: INSERTION-CATHETER-JOSEPH;  Surgeon: Dionte Gan MD;  Location: [facility] OR;  Service: General;  Laterality: Left;    PLACEMENT OF DUAL-LUMEN VASCULAR CATHETER Right 2022    Procedure: INSERTION-CATHETER-Hemosplit;  Surgeon: Dionte Gan MD;  Location: [facility];   Service: General;  Laterality: Right;       Review of patient's allergies indicates:   Allergen Reactions    Penicillins Hives       No current facility-administered medications on file prior to encounter.     Current Outpatient Medications on File Prior to Encounter   Medication Sig    amLODIPine (NORVASC) 5 MG tablet Take 1 tablet (5 mg total) by mouth once daily.    apixaban (ELIQUIS) 5 mg Tab Take 1 tablet (5 mg total) by mouth 2 (two) times daily.    carvediloL (COREG) 25 MG tablet Take 1 tablet (25 mg total) by mouth 2 (two) times daily.    FLUoxetine 20 MG capsule Take 1 capsule (20 mg total) by mouth once daily.    gabapentin (NEURONTIN) 300 MG capsule Take 2 capsules twice a day by oral route for 90 days. (Patient taking differently: Take 600 mg by mouth 2 (two) times daily.)    hydrALAZINE (APRESOLINE) 100 MG tablet Take 1 tablet (100 mg total) by mouth 2 (two) times a day.    insulin aspart U-100 (NOVOLOG FLEXPEN U-100 INSULIN) 100 unit/mL (3 mL) InPn pen Inject 8 units 3 times a day by subcutaneous route before meals (Patient taking differently: Inject 8 Units into the skin 3 (three) times daily before meals.)    insulin detemir U-100, Levemir, (LEVEMIR FLEXTOUCH U100 INSULIN) 100 unit/mL (3 mL) InPn pen Inject 18 units every day by subcutaneous route in the evening    isosorbide mononitrate (IMDUR) 30 MG 24 hr tablet Take 1 tablet (30 mg total) by mouth once daily.    levETIRAcetam (KEPPRA) 500 MG Tab Take 1 tablet twice a day by oral route for 30 days. (Patient taking differently: Take 500 mg by mouth 2 (two) times daily.)    ondansetron (ZOFRAN-ODT) 4 MG TbDL Take 1 tablet (4 mg total) by mouth every 8 (eight) hours as needed.    pantoprazole (PROTONIX) 40 MG tablet Take 1 tablet (40 mg total) by mouth once daily.    promethazine (PHENERGAN) 25 MG suppository Place 1 suppository (25 mg total) rectally every 6 (six) hours as needed for Nausea.    insulin detemir U-100, Levemir, (LEVEMIR FLEXTOUCH U100  "INSULIN) 100 unit/mL (3 mL) InPn pen Inject 21 units every day by subcutaneous route in the evening for 90 days.    lancets 33 gauge Misc Use to test twice daily    ondansetron (ZOFRAN-ODT) 4 MG TbDL Place 1 tablet (4 mg total) under the tongue every 8 (eight) hours.    pen needle, diabetic 31 gauge x 3/16" Ndle Use as directed to inject insulin 5 times daily    [DISCONTINUED] atenoloL (TENORMIN) 50 MG tablet Take 1 tablet (50 mg total) by mouth every other day.    [DISCONTINUED] omeprazole (PRILOSEC) 20 MG capsule Take 2 capsules (40 mg total) by mouth once daily. for 10 days     Family History       Problem Relation (Age of Onset)    Diabetes Mother, Father          Tobacco Use    Smoking status: Never    Smokeless tobacco: Never   Substance and Sexual Activity    Alcohol use: Not Currently    Drug use: No    Sexual activity: Not Currently     Partners: Male     Birth control/protection: I.U.D.     Review of Systems   Constitutional:  Negative for activity change and appetite change.   HENT:  Negative for congestion and dental problem.    Eyes:  Negative for discharge and itching.   Respiratory:  Negative for shortness of breath.    Cardiovascular:  Negative for chest pain.   Gastrointestinal:  Positive for abdominal pain. Negative for abdominal distention.   Endocrine: Negative for cold intolerance.   Genitourinary:  Negative for difficulty urinating and dysuria.   Musculoskeletal:  Negative for arthralgias and back pain.   Skin:  Negative for color change.   Neurological:  Negative for dizziness and facial asymmetry.   Hematological:  Negative for adenopathy.   Psychiatric/Behavioral:  Negative for agitation and behavioral problems.      Objective:     Vital Signs (Most Recent):  Temp: 97.3 °F (36.3 °C) (09/19/23 2021)  Pulse: 91 (09/19/23 2021)  Resp: 18 (09/19/23 2027)  BP: (!) 190/120 (09/19/23 2021)  SpO2: 100 % (09/19/23 2021) Vital Signs (24h Range):  Temp:  [97.3 °F (36.3 °C)-98 °F (36.7 °C)] 97.3 °F " (36.3 °C)  Pulse:  [85-94] 91  Resp:  [18-22] 18  SpO2:  [95 %-100 %] 100 %  BP: (125-190)/() 190/120     Weight: 48.9 kg (107 lb 12.9 oz)  Body mass index is 19.72 kg/m².     Physical Exam  Vitals and nursing note reviewed.   Constitutional:       General: She is not in acute distress.  HENT:      Head: Atraumatic.      Right Ear: External ear normal.      Left Ear: External ear normal.      Nose: Nose normal.      Mouth/Throat:      Mouth: Mucous membranes are moist.   Cardiovascular:      Rate and Rhythm: Normal rate.   Pulmonary:      Effort: Pulmonary effort is normal.   Abdominal:      Palpations: Abdomen is soft.   Musculoskeletal:         General: Normal range of motion.      Cervical back: Normal range of motion.   Skin:     General: Skin is warm.   Neurological:      Mental Status: She is alert and oriented to person, place, and time.   Psychiatric:         Behavior: Behavior normal.                Significant Labs: All pertinent labs within the past 24 hours have been reviewed.  CBC:   Recent Labs   Lab 09/19/23  1308   WBC 5.06   HGB 12.6   HCT 38.0   PLT 73*     CMP:   Recent Labs   Lab 09/19/23  1308      K 3.7      CO2 19*   *   BUN 39*   CREATININE 6.4*   CALCIUM 8.2*   PROT 7.2   ALBUMIN 3.6   BILITOT 1.5*   ALKPHOS 169*   AST 24   ALT 18   ANIONGAP 16       Significant Imaging: I have reviewed all pertinent imaging results/findings within the past 24 hours.

## 2023-09-20 NOTE — NURSING
Nurses Note -- 4 Eyes      9/20/2023   4:32 AM      Skin assessed during: Admit      [x] No Altered Skin Integrity Present    []Prevention Measures Documented      [] Yes- Altered Skin Integrity Present or Discovered   [] LDA Added if Not in Epic (Describe Wound)   [] New Altered Skin Integrity was Present on Admit and Documented in LDA   [] Wound Image Taken    Wound Care Consulted? No    Attending Nurse:  Jennifer Woo RN/Staff Member:   [id number]

## 2023-09-21 LAB
HBV SURFACE AB SER QL: REACTIVE
HBV SURFACE AG SERPL QL IA: NEGATIVE

## 2023-09-21 NOTE — DISCHARGE SUMMARY
[facility]  Discharge Summary  Patient Name: Tabby Howard MRN: [id number]   Patient Class: OP- Observation  Length of Stay: 1   Admission Date: 9/19/2023 12:38 PM Attending Physician: Isai Grady MD   Primary Care Provider: Melony Kim NP Face-to-Face encounter date: 09/20/2023   Chief Complaint: No chief complaint on file.    Date of Discharge: 9/20/2023  Discharge Disposition:Home or Self Care   Condition: Stable       Reason for Hospitalization     Active Hospital Problems    Diagnosis    *Abdominal pain    Frequent hospital admissions    Type 2 diabetes mellitus with hyperglycemia, with long-term current use of insulin, previous HbA1c 5.8%    Drug-seeking behavior    Deep vein thrombosis (DVT) of non-extremity vein    ESRD (end stage renal disease)         Brief History of Present Illness     33 year old patient again  getting admitted with abdominal pain   Pt was at HD center for dialysis and started having abdominal pain  Pain all over , worse with movements  Pt was asking for iv dilaudid to relieve the pain   No other issues    For the full HPI please refer to the History & Physical from this admission.    Hospital Course By Problem with Pertinent Findings       Abdominal pain  Pt again getting admitted with abdominal  pain   Mostly narcotic bowel syndrome in the background of pain med seeking behavior  unlikely gastroparesis  Abdomen is soft, no pain when I palpate  Discharge         Frequent hospital admissions  In 2023 alone  She had 17 IP admissions  16 CT abdomen/pelvis  CTA chest x 3  22 CXR  6 KUB           Type 2 diabetes mellitus with hyperglycemia, with long-term current use of insulin, previous HbA1c 5.8%  HbA1C whenever checked this year was normal  I didn't started her on any insulin regime  Ideally she should be not on any insulin regime because pt had admissions before due to hypothermia stemming from hypoglycemia        Drug-seeking behavior  Only interested in  "iv dilaudid         Deep vein thrombosis (DVT) of non-extremity vein  She is on eliquis for this  Will hold this in view with thrombocytopenia        ESRD (end stage renal disease)  HD as per Nephro team  No need for HD  Discharge and she can resume her t/th/sat schedule tomorrow          Patient was seen and examined on the date of discharge and determined to be suitable for discharge.    Physical Exam  /77 (BP Location: Left arm, Patient Position: Lying)   Pulse 84   Temp 98.4 °F (36.9 °C) (Oral)   Resp 16   Ht 5' 2" (1.575 m)   Wt 48.9 kg (107 lb 12.9 oz)   SpO2 (!) 94%   Breastfeeding No   BMI 19.72 kg/m²   Vitals reviewed.    Constitutional: No distress.   HENT: Atraumatic.   Cardiovascular: Normal rate, regular rhythm.  S1 S2.    Pulmonary/Chest: Effort normal. Clear to auscultation bilaterally. No wheezes.   Abdominal: Soft. Bowel sounds are normal. Exhibits no distension and no mass. No tenderness  Neurological: Alert.   Skin: Skin is warm and dry.     Following labs were Reviewed   Recent Labs   Lab 09/20/23  0409   WBC 4.68   HGB 13.0   HCT 38.9   PLT 64*   CALCIUM 9.3   ALBUMIN 3.8   PROT 7.5      K 3.9   CO2 24      BUN 27*   CREATININE 5.3*   ALKPHOS 112   ALT 19   AST 23   BILITOT 2.3*     No results found for: "POCTGLUCOSE"     All labs within the past 24 hours have been reviewed    Microbiology Results (last 7 days)       ** No results found for the last 168 hours. **          CT Abdomen Pelvis  Without Contrast   Final Result      X-Ray Chest AP Portable   Final Result          No results found for this or any previous visit.      Consultants and Procedures   Consultants:  nephro    Procedures:   * No surgery found *     Discharge Information:   Diet:  Resume renal/ada    Physical Activity:  Activity as tolerated    Instructions:  1. Take all medications as prescribed  2. Keep all follow-up appointments  3. Return to the hospital or call your primary care physicians if any " "worsening symptoms such as chest pain, shortness of breath, bleeding,  intractable pain, fever >101 occur.      Follow-Up Appointments:  Please call your primary care physician to schedule an appointment in 1 week time.     Follow-up Information       Melony Kim NP Follow up in 1 week(s).    Specialty: Nurse Practitioner  Why: 10/4/2023 at 11:30 with CARO Kim  Contact information:  [place] [id number]  [phone]                               Pending laboratory work/Tests to be performed/followed by the Primary care Physician:    The patient was discharged with discharge instructions reviewed in written and verbal form. All questions were answered and prescriptions were provided. The importance of making follow up appointments and compliance of medications has been emphasized. The patient will follow up in 1 week or sooner as needed with the PCP. Tthe patient understands and agrees with the plan. Upon discharge, patient needs to be on following medications.    Discharge Medications:     Medication List        CONTINUE taking these medications      amLODIPine 5 MG tablet  Commonly known as: NORVASC  Take 1 tablet (5 mg total) by mouth once daily.     BD ULTRA-FINE MINI PEN NEEDLE 31 gauge x 3/16" Ndle  Generic drug: pen needle, diabetic  Use as directed to inject insulin 5 times daily     carvediloL 25 MG tablet  Commonly known as: COREG  Take 1 tablet (25 mg total) by mouth 2 (two) times daily.     ELIQUIS 5 mg Tab  Generic drug: apixaban  Take 1 tablet (5 mg total) by mouth 2 (two) times daily.     FLUoxetine 20 MG capsule  Take 1 capsule (20 mg total) by mouth once daily.     gabapentin 300 MG capsule  Commonly known as: NEURONTIN  Take 2 capsules twice a day by oral route for 90 days.     hydrALAZINE 100 MG tablet  Commonly known as: APRESOLINE  Take 1 tablet (100 mg total) by mouth 2 (two) times a day.     insulin aspart U-100 100 unit/mL (3 mL) Inpn pen  Commonly known as: NovoLOG " FlexPen U-100 Insulin  Inject 8 units 3 times a day by subcutaneous route before meals     isosorbide mononitrate 30 MG 24 hr tablet  Commonly known as: IMDUR  Take 1 tablet (30 mg total) by mouth once daily.     lancets 33 gauge Misc  Use to test twice daily     * LEVEMIR FLEXTOUCH U100 INSULIN 100 unit/mL (3 mL) Inpn pen  Generic drug: insulin detemir U-100 (Levemir)  Inject 18 units every day by subcutaneous route in the evening     * LEVEMIR FLEXPEN 100 unit/mL (3 mL) Inpn pen  Generic drug: insulin detemir U-100 (Levemir)  Inject 21 units every day by subcutaneous route in the evening for 90 days.     levETIRAcetam 500 MG Tab  Commonly known as: KEPPRA  Take 1 tablet twice a day by oral route for 30 days.     * ondansetron 4 MG Tbdl  Commonly known as: ZOFRAN-ODT  Place 1 tablet (4 mg total) under the tongue every 8 (eight) hours.     * ondansetron 4 MG Tbdl  Commonly known as: ZOFRAN-ODT  Take 1 tablet (4 mg total) by mouth every 8 (eight) hours as needed.     pantoprazole 40 MG tablet  Commonly known as: PROTONIX  Take 1 tablet (40 mg total) by mouth once daily.     promethazine 25 MG suppository  Commonly known as: PHENERGAN  Place 1 suppository (25 mg total) rectally every 6 (six) hours as needed for Nausea.           * This list has 4 medication(s) that are the same as other medications prescribed for you. Read the directions carefully, and ask your doctor or other care provider to review them with you.                    I spent 20 minutes preparing the discharge for this patient including reviewing records from previous encounters, preparation of discharge summary, assessing and final examination of the patient, discharge medicine reconciliation, discussing plan of care, follow up and education and prescriptions.       Isai Grady  [facility] Hospitalist

## 2023-10-03 ENCOUNTER — HOSPITAL ENCOUNTER (INPATIENT)
Facility: HOSPITAL | Age: 33
LOS: 2 days | Discharge: HOME | DRG: 291 | End: 2023-10-05
Attending: EMERGENCY MEDICINE | Admitting: INTERNAL MEDICINE
Payer: MEDICAID

## 2023-10-03 DIAGNOSIS — E87.5 HYPERKALEMIA: ICD-10-CM

## 2023-10-03 DIAGNOSIS — N18.6 ESRD (END STAGE RENAL DISEASE): ICD-10-CM

## 2023-10-03 DIAGNOSIS — N18.6 END STAGE RENAL DISEASE: ICD-10-CM

## 2023-10-03 DIAGNOSIS — R07.9 CHEST PAIN: ICD-10-CM

## 2023-10-03 DIAGNOSIS — R10.13 EPIGASTRIC PAIN: Primary | ICD-10-CM

## 2023-10-03 DIAGNOSIS — R09.2 RESPIRATORY ARREST: ICD-10-CM

## 2023-10-03 DIAGNOSIS — I46.9 CARDIAC ARREST: ICD-10-CM

## 2023-10-03 PROBLEM — R94.31 PROLONGED QT INTERVAL: Status: ACTIVE | Noted: 2023-10-03

## 2023-10-03 LAB
ALBUMIN SERPL BCP-MCNC: 4 G/DL (ref 3.5–5.2)
ALBUMIN SERPL BCP-MCNC: 4 G/DL (ref 3.5–5.2)
ALBUMIN SERPL BCP-MCNC: 4.2 G/DL (ref 3.5–5.2)
ALLENS TEST: ABNORMAL
ALLENS TEST: ABNORMAL
ALP SERPL-CCNC: 227 U/L (ref 55–135)
ALP SERPL-CCNC: 233 U/L (ref 55–135)
ALP SERPL-CCNC: 252 U/L (ref 55–135)
ALT SERPL W/O P-5'-P-CCNC: 149 U/L (ref 10–44)
ALT SERPL W/O P-5'-P-CCNC: 179 U/L (ref 10–44)
ALT SERPL W/O P-5'-P-CCNC: 204 U/L (ref 10–44)
ANION GAP SERPL CALC-SCNC: 11 MMOL/L (ref 8–16)
ANION GAP SERPL CALC-SCNC: 11 MMOL/L (ref 8–16)
ANION GAP SERPL CALC-SCNC: 12 MMOL/L (ref 8–16)
ANION GAP SERPL CALC-SCNC: 20 MMOL/L (ref 8–16)
AST SERPL-CCNC: 175 U/L (ref 10–40)
AST SERPL-CCNC: 213 U/L (ref 10–40)
AST SERPL-CCNC: 227 U/L (ref 10–40)
BASOPHILS # BLD AUTO: 0.03 K/UL (ref 0–0.2)
BASOPHILS # BLD AUTO: 0.03 K/UL (ref 0–0.2)
BASOPHILS NFR BLD: 0.5 % (ref 0–1.9)
BASOPHILS NFR BLD: 0.7 % (ref 0–1.9)
BILIRUB SERPL-MCNC: 1.3 MG/DL (ref 0.1–1)
BILIRUB SERPL-MCNC: 1.6 MG/DL (ref 0.1–1)
BILIRUB SERPL-MCNC: 2.4 MG/DL (ref 0.1–1)
BNP SERPL-MCNC: 3572 PG/ML (ref 0–99)
BUN SERPL-MCNC: 19 MG/DL (ref 6–20)
BUN SERPL-MCNC: 19 MG/DL (ref 6–20)
BUN SERPL-MCNC: 46 MG/DL (ref 6–20)
BUN SERPL-MCNC: 51 MG/DL (ref 6–20)
CALCIUM SERPL-MCNC: 8.4 MG/DL (ref 8.7–10.5)
CALCIUM SERPL-MCNC: 8.5 MG/DL (ref 8.7–10.5)
CALCIUM SERPL-MCNC: 9.2 MG/DL (ref 8.7–10.5)
CALCIUM SERPL-MCNC: 9.7 MG/DL (ref 8.7–10.5)
CHLORIDE SERPL-SCNC: 104 MMOL/L (ref 95–110)
CHLORIDE SERPL-SCNC: 105 MMOL/L (ref 95–110)
CHLORIDE SERPL-SCNC: 97 MMOL/L (ref 95–110)
CHLORIDE SERPL-SCNC: 99 MMOL/L (ref 95–110)
CK SERPL-CCNC: 111 U/L (ref 20–180)
CK SERPL-CCNC: 57 U/L (ref 20–180)
CO2 SERPL-SCNC: 20 MMOL/L (ref 23–29)
CO2 SERPL-SCNC: 22 MMOL/L (ref 23–29)
CO2 SERPL-SCNC: 22 MMOL/L (ref 23–29)
CO2 SERPL-SCNC: 26 MMOL/L (ref 23–29)
CREAT SERPL-MCNC: 3 MG/DL (ref 0.5–1.4)
CREAT SERPL-MCNC: 3.5 MG/DL (ref 0.5–1.4)
CREAT SERPL-MCNC: 6.5 MG/DL (ref 0.5–1.4)
CREAT SERPL-MCNC: 6.7 MG/DL (ref 0.5–1.4)
DELSYS: ABNORMAL
DELSYS: ABNORMAL
DIFFERENTIAL METHOD: ABNORMAL
DIFFERENTIAL METHOD: ABNORMAL
EOSINOPHIL # BLD AUTO: 0.1 K/UL (ref 0–0.5)
EOSINOPHIL # BLD AUTO: 0.2 K/UL (ref 0–0.5)
EOSINOPHIL NFR BLD: 1.7 % (ref 0–8)
EOSINOPHIL NFR BLD: 3.2 % (ref 0–8)
ERYTHROCYTE [DISTWIDTH] IN BLOOD BY AUTOMATED COUNT: 16.7 % (ref 11.5–14.5)
ERYTHROCYTE [DISTWIDTH] IN BLOOD BY AUTOMATED COUNT: 16.9 % (ref 11.5–14.5)
ERYTHROCYTE [SEDIMENTATION RATE] IN BLOOD BY WESTERGREN METHOD: 16 MM/H
ERYTHROCYTE [SEDIMENTATION RATE] IN BLOOD BY WESTERGREN METHOD: 16 MM/H
EST. GFR  (NO RACE VARIABLE): 16.9 ML/MIN/1.73 M^2
EST. GFR  (NO RACE VARIABLE): 20.3 ML/MIN/1.73 M^2
EST. GFR  (NO RACE VARIABLE): 7.7 ML/MIN/1.73 M^2
EST. GFR  (NO RACE VARIABLE): 8 ML/MIN/1.73 M^2
FIO2: 100
FIO2: 30
GLUCOSE SERPL-MCNC: 117 MG/DL (ref 70–110)
GLUCOSE SERPL-MCNC: 121 MG/DL (ref 70–110)
GLUCOSE SERPL-MCNC: 123 MG/DL (ref 70–110)
GLUCOSE SERPL-MCNC: 124 MG/DL (ref 70–110)
GLUCOSE SERPL-MCNC: 127 MG/DL (ref 70–110)
GLUCOSE SERPL-MCNC: 313 MG/DL (ref 70–110)
GLUCOSE SERPL-MCNC: 320 MG/DL (ref 70–110)
GLUCOSE SERPL-MCNC: 322 MG/DL (ref 70–110)
GLUCOSE SERPL-MCNC: 340 MG/DL (ref 70–110)
HCG INTACT+B SERPL-ACNC: 0.9 MIU/ML
HCO3 UR-SCNC: 21.7 MMOL/L (ref 24–28)
HCO3 UR-SCNC: 29.9 MMOL/L (ref 24–28)
HCT VFR BLD AUTO: 28.2 % (ref 37–48.5)
HCT VFR BLD AUTO: 30.5 % (ref 37–48.5)
HCT VFR BLD CALC: 28 %PCV (ref 36–54)
HCT VFR BLD CALC: 29 %PCV (ref 36–54)
HGB BLD-MCNC: 10.3 G/DL (ref 12–16)
HGB BLD-MCNC: 9.7 G/DL (ref 12–16)
IMM GRANULOCYTES # BLD AUTO: 0.02 K/UL (ref 0–0.04)
IMM GRANULOCYTES # BLD AUTO: 0.03 K/UL (ref 0–0.04)
IMM GRANULOCYTES NFR BLD AUTO: 0.3 % (ref 0–0.5)
IMM GRANULOCYTES NFR BLD AUTO: 0.7 % (ref 0–0.5)
INR PPP: 0.9 (ref 0.8–1.2)
LACTATE SERPL-SCNC: 0.9 MMOL/L (ref 0.5–1.9)
LACTATE SERPL-SCNC: 1.3 MMOL/L (ref 0.5–1.9)
LIPASE SERPL-CCNC: 27 U/L (ref 4–60)
LYMPHOCYTES # BLD AUTO: 0.3 K/UL (ref 1–4.8)
LYMPHOCYTES # BLD AUTO: 0.6 K/UL (ref 1–4.8)
LYMPHOCYTES NFR BLD: 10.1 % (ref 18–48)
LYMPHOCYTES NFR BLD: 7.4 % (ref 18–48)
MAGNESIUM SERPL-MCNC: 2.2 MG/DL (ref 1.6–2.6)
MCH RBC QN AUTO: 30.3 PG (ref 27–31)
MCH RBC QN AUTO: 30.3 PG (ref 27–31)
MCHC RBC AUTO-ENTMCNC: 33.8 G/DL (ref 32–36)
MCHC RBC AUTO-ENTMCNC: 34.4 G/DL (ref 32–36)
MCV RBC AUTO: 88 FL (ref 82–98)
MCV RBC AUTO: 90 FL (ref 82–98)
MIN VOL: 6.8
MODE: ABNORMAL
MODE: ABNORMAL
MONOCYTES # BLD AUTO: 0.2 K/UL (ref 0.3–1)
MONOCYTES # BLD AUTO: 0.4 K/UL (ref 0.3–1)
MONOCYTES NFR BLD: 5.8 % (ref 4–15)
MONOCYTES NFR BLD: 6.4 % (ref 4–15)
NEUTROPHILS # BLD AUTO: 3.5 K/UL (ref 1.8–7.7)
NEUTROPHILS # BLD AUTO: 5 K/UL (ref 1.8–7.7)
NEUTROPHILS NFR BLD: 79.5 % (ref 38–73)
NEUTROPHILS NFR BLD: 83.7 % (ref 38–73)
NRBC BLD-RTO: 0 /100 WBC
NRBC BLD-RTO: 0 /100 WBC
PCO2 BLDA: 45.2 MMHG (ref 35–45)
PCO2 BLDA: 46.8 MMHG (ref 35–45)
PEEP: 5
PEEP: 5
PH SMN: 7.29 [PH] (ref 7.35–7.45)
PH SMN: 7.41 [PH] (ref 7.35–7.45)
PIP: 21
PLATELET # BLD AUTO: 70 K/UL (ref 150–450)
PLATELET # BLD AUTO: 95 K/UL (ref 150–450)
PMV BLD AUTO: 10.5 FL (ref 9.2–12.9)
PMV BLD AUTO: 11.7 FL (ref 9.2–12.9)
PO2 BLDA: 110 MMHG (ref 80–100)
PO2 BLDA: 238 MMHG (ref 80–100)
POC BE: -5 MMOL/L
POC BE: 5 MMOL/L
POC IONIZED CALCIUM: 0.97 MMOL/L (ref 1.06–1.42)
POC IONIZED CALCIUM: 1.29 MMOL/L (ref 1.06–1.42)
POC SATURATED O2: 100 % (ref 95–100)
POC SATURATED O2: 98 % (ref 95–100)
POC TCO2: 23 MMOL/L (ref 23–27)
POC TCO2: 31 MMOL/L (ref 23–27)
POTASSIUM BLD-SCNC: 4.6 MMOL/L (ref 3.5–5.1)
POTASSIUM BLD-SCNC: 7.1 MMOL/L (ref 3.5–5.1)
POTASSIUM SERPL-SCNC: 4.1 MMOL/L (ref 3.5–5.1)
POTASSIUM SERPL-SCNC: 4.7 MMOL/L (ref 3.5–5.1)
POTASSIUM SERPL-SCNC: 5.7 MMOL/L (ref 3.5–5.1)
POTASSIUM SERPL-SCNC: 5.9 MMOL/L (ref 3.5–5.1)
PROT SERPL-MCNC: 7.1 G/DL (ref 6–8.4)
PROT SERPL-MCNC: 7.1 G/DL (ref 6–8.4)
PROT SERPL-MCNC: 7.4 G/DL (ref 6–8.4)
PROTHROMBIN TIME: 10.6 SEC (ref 9–12.5)
RBC # BLD AUTO: 3.2 M/UL (ref 4–5.4)
RBC # BLD AUTO: 3.4 M/UL (ref 4–5.4)
SAMPLE: ABNORMAL
SAMPLE: ABNORMAL
SITE: ABNORMAL
SITE: ABNORMAL
SODIUM BLD-SCNC: 132 MMOL/L (ref 136–145)
SODIUM BLD-SCNC: 136 MMOL/L (ref 136–145)
SODIUM SERPL-SCNC: 137 MMOL/L (ref 136–145)
SODIUM SERPL-SCNC: 138 MMOL/L (ref 136–145)
SP02: 100
TROPONIN I SERPL HS-MCNC: 37.9 PG/ML (ref 0–14.9)
TSH SERPL DL<=0.005 MIU/L-ACNC: 3.5 UIU/ML (ref 0.34–5.6)
VT: 350
VT: 358
WBC # BLD AUTO: 4.17 K/UL (ref 3.9–12.7)
WBC # BLD AUTO: 6.26 K/UL (ref 3.9–12.7)

## 2023-10-03 PROCEDURE — 92950 HEART/LUNG RESUSCITATION CPR: CPT

## 2023-10-03 PROCEDURE — 63600175 PHARM REV CODE 636 W HCPCS: Performed by: EMERGENCY MEDICINE

## 2023-10-03 PROCEDURE — 83735 ASSAY OF MAGNESIUM: CPT | Performed by: EMERGENCY MEDICINE

## 2023-10-03 PROCEDURE — 94761 N-INVAS EAR/PLS OXIMETRY MLT: CPT

## 2023-10-03 PROCEDURE — 84295 ASSAY OF SERUM SODIUM: CPT

## 2023-10-03 PROCEDURE — 82550 ASSAY OF CK (CPK): CPT | Mod: 91 | Performed by: INTERNAL MEDICINE

## 2023-10-03 PROCEDURE — 63600175 PHARM REV CODE 636 W HCPCS: Performed by: INTERNAL MEDICINE

## 2023-10-03 PROCEDURE — 25000003 PHARM REV CODE 250: Performed by: STUDENT IN AN ORGANIZED HEALTH CARE EDUCATION/TRAINING PROGRAM

## 2023-10-03 PROCEDURE — G0378 HOSPITAL OBSERVATION PER HR: HCPCS

## 2023-10-03 PROCEDURE — C9113 INJ PANTOPRAZOLE SODIUM, VIA: HCPCS | Performed by: EMERGENCY MEDICINE

## 2023-10-03 PROCEDURE — 36600 WITHDRAWAL OF ARTERIAL BLOOD: CPT

## 2023-10-03 PROCEDURE — 80053 COMPREHEN METABOLIC PANEL: CPT | Mod: 91 | Performed by: INTERNAL MEDICINE

## 2023-10-03 PROCEDURE — 94002 VENT MGMT INPAT INIT DAY: CPT

## 2023-10-03 PROCEDURE — 96375 TX/PRO/DX INJ NEW DRUG ADDON: CPT

## 2023-10-03 PROCEDURE — 85014 HEMATOCRIT: CPT

## 2023-10-03 PROCEDURE — 87205 SMEAR GRAM STAIN: CPT | Performed by: INTERNAL MEDICINE

## 2023-10-03 PROCEDURE — 96372 THER/PROPH/DIAG INJ SC/IM: CPT | Performed by: STUDENT IN AN ORGANIZED HEALTH CARE EDUCATION/TRAINING PROGRAM

## 2023-10-03 PROCEDURE — 96367 TX/PROPH/DG ADDL SEQ IV INF: CPT

## 2023-10-03 PROCEDURE — 93010 EKG 12-LEAD: ICD-10-PCS | Mod: 59,,, | Performed by: INTERNAL MEDICINE

## 2023-10-03 PROCEDURE — 84484 ASSAY OF TROPONIN QUANT: CPT | Performed by: EMERGENCY MEDICINE

## 2023-10-03 PROCEDURE — 63600175 PHARM REV CODE 636 W HCPCS

## 2023-10-03 PROCEDURE — 87040 BLOOD CULTURE FOR BACTERIA: CPT | Performed by: STUDENT IN AN ORGANIZED HEALTH CARE EDUCATION/TRAINING PROGRAM

## 2023-10-03 PROCEDURE — 87070 CULTURE OTHR SPECIMN AEROBIC: CPT | Performed by: INTERNAL MEDICINE

## 2023-10-03 PROCEDURE — 63600175 PHARM REV CODE 636 W HCPCS: Performed by: STUDENT IN AN ORGANIZED HEALTH CARE EDUCATION/TRAINING PROGRAM

## 2023-10-03 PROCEDURE — 96365 THER/PROPH/DIAG IV INF INIT: CPT

## 2023-10-03 PROCEDURE — 25000242 PHARM REV CODE 250 ALT 637 W/ HCPCS: Performed by: INTERNAL MEDICINE

## 2023-10-03 PROCEDURE — 99900031 HC PATIENT EDUCATION (STAT)

## 2023-10-03 PROCEDURE — 82962 GLUCOSE BLOOD TEST: CPT

## 2023-10-03 PROCEDURE — 82550 ASSAY OF CK (CPK): CPT | Performed by: EMERGENCY MEDICINE

## 2023-10-03 PROCEDURE — 83690 ASSAY OF LIPASE: CPT | Performed by: EMERGENCY MEDICINE

## 2023-10-03 PROCEDURE — 25000242 PHARM REV CODE 250 ALT 637 W/ HCPCS: Performed by: EMERGENCY MEDICINE

## 2023-10-03 PROCEDURE — 85610 PROTHROMBIN TIME: CPT | Performed by: EMERGENCY MEDICINE

## 2023-10-03 PROCEDURE — 99291 CRITICAL CARE FIRST HOUR: CPT | Mod: ,,, | Performed by: STUDENT IN AN ORGANIZED HEALTH CARE EDUCATION/TRAINING PROGRAM

## 2023-10-03 PROCEDURE — 93005 ELECTROCARDIOGRAM TRACING: CPT | Performed by: INTERNAL MEDICINE

## 2023-10-03 PROCEDURE — 20000000 HC ICU ROOM

## 2023-10-03 PROCEDURE — 31500 INSERT EMERGENCY AIRWAY: CPT

## 2023-10-03 PROCEDURE — 99900026 HC AIRWAY MAINTENANCE (STAT)

## 2023-10-03 PROCEDURE — 99900035 HC TECH TIME PER 15 MIN (STAT)

## 2023-10-03 PROCEDURE — 80053 COMPREHEN METABOLIC PANEL: CPT | Performed by: EMERGENCY MEDICINE

## 2023-10-03 PROCEDURE — 25000003 PHARM REV CODE 250: Performed by: INTERNAL MEDICINE

## 2023-10-03 PROCEDURE — 96376 TX/PRO/DX INJ SAME DRUG ADON: CPT

## 2023-10-03 PROCEDURE — 82803 BLOOD GASES ANY COMBINATION: CPT

## 2023-10-03 PROCEDURE — 99291 PR CRITICAL CARE, E/M 30-74 MINUTES: ICD-10-PCS | Mod: ,,, | Performed by: STUDENT IN AN ORGANIZED HEALTH CARE EDUCATION/TRAINING PROGRAM

## 2023-10-03 PROCEDURE — 27000221 HC OXYGEN, UP TO 24 HOURS

## 2023-10-03 PROCEDURE — 83880 ASSAY OF NATRIURETIC PEPTIDE: CPT | Performed by: EMERGENCY MEDICINE

## 2023-10-03 PROCEDURE — 94640 AIRWAY INHALATION TREATMENT: CPT

## 2023-10-03 PROCEDURE — 99291 CRITICAL CARE FIRST HOUR: CPT

## 2023-10-03 PROCEDURE — 80100014 HC HEMODIALYSIS 1:1

## 2023-10-03 PROCEDURE — 25000003 PHARM REV CODE 250: Performed by: EMERGENCY MEDICINE

## 2023-10-03 PROCEDURE — 84702 CHORIONIC GONADOTROPIN TEST: CPT | Performed by: INTERNAL MEDICINE

## 2023-10-03 PROCEDURE — 84132 ASSAY OF SERUM POTASSIUM: CPT

## 2023-10-03 PROCEDURE — 80048 BASIC METABOLIC PNL TOTAL CA: CPT | Performed by: INTERNAL MEDICINE

## 2023-10-03 PROCEDURE — 96366 THER/PROPH/DIAG IV INF ADDON: CPT

## 2023-10-03 PROCEDURE — 93010 ELECTROCARDIOGRAM REPORT: CPT | Mod: ,,, | Performed by: INTERNAL MEDICINE

## 2023-10-03 PROCEDURE — 94760 N-INVAS EAR/PLS OXIMETRY 1: CPT

## 2023-10-03 PROCEDURE — 83605 ASSAY OF LACTIC ACID: CPT | Mod: 91 | Performed by: INTERNAL MEDICINE

## 2023-10-03 PROCEDURE — 85025 COMPLETE CBC W/AUTO DIFF WBC: CPT | Mod: 91 | Performed by: INTERNAL MEDICINE

## 2023-10-03 PROCEDURE — 36415 COLL VENOUS BLD VENIPUNCTURE: CPT | Performed by: EMERGENCY MEDICINE

## 2023-10-03 PROCEDURE — 85025 COMPLETE CBC W/AUTO DIFF WBC: CPT | Performed by: EMERGENCY MEDICINE

## 2023-10-03 PROCEDURE — 25500020 PHARM REV CODE 255: Performed by: INTERNAL MEDICINE

## 2023-10-03 PROCEDURE — 82330 ASSAY OF CALCIUM: CPT

## 2023-10-03 PROCEDURE — 93010 ELECTROCARDIOGRAM REPORT: CPT | Mod: 59,,, | Performed by: INTERNAL MEDICINE

## 2023-10-03 PROCEDURE — 36415 COLL VENOUS BLD VENIPUNCTURE: CPT | Performed by: INTERNAL MEDICINE

## 2023-10-03 PROCEDURE — 90935 HEMODIALYSIS ONE EVALUATION: CPT

## 2023-10-03 PROCEDURE — 94644 CONT INHLJ TX 1ST HOUR: CPT

## 2023-10-03 PROCEDURE — 84443 ASSAY THYROID STIM HORMONE: CPT | Performed by: INTERNAL MEDICINE

## 2023-10-03 RX ORDER — HYDROMORPHONE HYDROCHLORIDE 1 MG/ML
1 INJECTION, SOLUTION INTRAMUSCULAR; INTRAVENOUS; SUBCUTANEOUS
Status: COMPLETED | OUTPATIENT
Start: 2023-10-03 | End: 2023-10-03

## 2023-10-03 RX ORDER — PANTOPRAZOLE SODIUM 40 MG/10ML
40 INJECTION, POWDER, LYOPHILIZED, FOR SOLUTION INTRAVENOUS
Status: COMPLETED | OUTPATIENT
Start: 2023-10-03 | End: 2023-10-03

## 2023-10-03 RX ORDER — HEPARIN SODIUM 5000 [USP'U]/ML
5000 INJECTION, SOLUTION INTRAVENOUS; SUBCUTANEOUS EVERY 8 HOURS
Status: DISCONTINUED | OUTPATIENT
Start: 2023-10-03 | End: 2023-10-04

## 2023-10-03 RX ORDER — NALOXONE HCL 0.4 MG/ML
0.02 VIAL (ML) INJECTION
Status: DISCONTINUED | OUTPATIENT
Start: 2023-10-03 | End: 2023-10-05 | Stop reason: HOSPADM

## 2023-10-03 RX ORDER — IBUPROFEN 200 MG
16 TABLET ORAL
Status: DISCONTINUED | OUTPATIENT
Start: 2023-10-03 | End: 2023-10-05 | Stop reason: HOSPADM

## 2023-10-03 RX ORDER — IBUPROFEN 200 MG
24 TABLET ORAL
Status: DISCONTINUED | OUTPATIENT
Start: 2023-10-03 | End: 2023-10-05 | Stop reason: HOSPADM

## 2023-10-03 RX ORDER — GLUCAGON 1 MG
1 KIT INJECTION
Status: DISCONTINUED | OUTPATIENT
Start: 2023-10-03 | End: 2023-10-05 | Stop reason: HOSPADM

## 2023-10-03 RX ORDER — PROPOFOL 10 MG/ML
0-50 INJECTION, EMULSION INTRAVENOUS CONTINUOUS
Status: DISCONTINUED | OUTPATIENT
Start: 2023-10-03 | End: 2023-10-03

## 2023-10-03 RX ORDER — SODIUM BICARBONATE 1 MEQ/ML
50 SYRINGE (ML) INTRAVENOUS
Status: COMPLETED | OUTPATIENT
Start: 2023-10-03 | End: 2023-10-03

## 2023-10-03 RX ORDER — HYDRALAZINE HYDROCHLORIDE 20 MG/ML
10 INJECTION INTRAMUSCULAR; INTRAVENOUS EVERY 6 HOURS PRN
Status: DISCONTINUED | OUTPATIENT
Start: 2023-10-03 | End: 2023-10-05 | Stop reason: HOSPADM

## 2023-10-03 RX ORDER — MAG HYDROX/ALUMINUM HYD/SIMETH 200-200-20
30 SUSPENSION, ORAL (FINAL DOSE FORM) ORAL ONCE
Status: DISCONTINUED | OUTPATIENT
Start: 2023-10-03 | End: 2023-10-03

## 2023-10-03 RX ORDER — CALCIUM GLUCONATE 20 MG/ML
1 INJECTION, SOLUTION INTRAVENOUS
Status: COMPLETED | OUTPATIENT
Start: 2023-10-03 | End: 2023-10-03

## 2023-10-03 RX ORDER — METRONIDAZOLE 500 MG/100ML
500 INJECTION, SOLUTION INTRAVENOUS
Status: COMPLETED | OUTPATIENT
Start: 2023-10-03 | End: 2023-10-03

## 2023-10-03 RX ORDER — INSULIN ASPART 100 [IU]/ML
0-10 INJECTION, SOLUTION INTRAVENOUS; SUBCUTANEOUS
Status: DISCONTINUED | OUTPATIENT
Start: 2023-10-03 | End: 2023-10-05 | Stop reason: HOSPADM

## 2023-10-03 RX ORDER — SODIUM CHLORIDE 9 MG/ML
INJECTION, SOLUTION INTRAVENOUS
Status: DISCONTINUED | OUTPATIENT
Start: 2023-10-03 | End: 2023-10-04

## 2023-10-03 RX ORDER — PROCHLORPERAZINE EDISYLATE 5 MG/ML
5 INJECTION INTRAMUSCULAR; INTRAVENOUS EVERY 4 HOURS PRN
Status: DISCONTINUED | OUTPATIENT
Start: 2023-10-03 | End: 2023-10-05 | Stop reason: HOSPADM

## 2023-10-03 RX ORDER — ALBUTEROL SULFATE 0.83 MG/ML
10 SOLUTION RESPIRATORY (INHALATION) CONTINUOUS
Status: DISCONTINUED | OUTPATIENT
Start: 2023-10-03 | End: 2023-10-03

## 2023-10-03 RX ORDER — LEVOFLOXACIN 5 MG/ML
500 INJECTION, SOLUTION INTRAVENOUS
Status: DISCONTINUED | OUTPATIENT
Start: 2023-10-03 | End: 2023-10-03

## 2023-10-03 RX ORDER — NALOXONE HCL 0.4 MG/ML
VIAL (ML) INJECTION
Status: COMPLETED
Start: 2023-10-03 | End: 2023-10-03

## 2023-10-03 RX ORDER — SODIUM CHLORIDE 9 MG/ML
INJECTION, SOLUTION INTRAVENOUS ONCE
Status: DISCONTINUED | OUTPATIENT
Start: 2023-10-03 | End: 2023-10-05 | Stop reason: HOSPADM

## 2023-10-03 RX ORDER — ALBUTEROL SULFATE 0.83 MG/ML
10 SOLUTION RESPIRATORY (INHALATION)
Status: COMPLETED | OUTPATIENT
Start: 2023-10-03 | End: 2023-10-03

## 2023-10-03 RX ORDER — MUPIROCIN 20 MG/G
OINTMENT TOPICAL 2 TIMES DAILY
Status: DISCONTINUED | OUTPATIENT
Start: 2023-10-03 | End: 2023-10-05 | Stop reason: HOSPADM

## 2023-10-03 RX ORDER — ONDANSETRON 2 MG/ML
8 INJECTION INTRAMUSCULAR; INTRAVENOUS
Status: COMPLETED | OUTPATIENT
Start: 2023-10-03 | End: 2023-10-03

## 2023-10-03 RX ORDER — CALCIUM GLUCONATE 20 MG/ML
1 INJECTION, SOLUTION INTRAVENOUS ONCE
Status: COMPLETED | OUTPATIENT
Start: 2023-10-03 | End: 2023-10-03

## 2023-10-03 RX ORDER — IODIXANOL 320 MG/ML
100 INJECTION, SOLUTION INTRAVASCULAR
Status: COMPLETED | OUTPATIENT
Start: 2023-10-03 | End: 2023-10-03

## 2023-10-03 RX ORDER — SODIUM CHLORIDE 0.9 % (FLUSH) 0.9 %
10 SYRINGE (ML) INJECTION EVERY 12 HOURS PRN
Status: DISCONTINUED | OUTPATIENT
Start: 2023-10-03 | End: 2023-10-05 | Stop reason: HOSPADM

## 2023-10-03 RX ORDER — LIDOCAINE HYDROCHLORIDE 20 MG/ML
15 SOLUTION OROPHARYNGEAL ONCE
Status: DISCONTINUED | OUTPATIENT
Start: 2023-10-03 | End: 2023-10-03

## 2023-10-03 RX ORDER — PROPOFOL 10 MG/ML
0-50 INJECTION, EMULSION INTRAVENOUS CONTINUOUS
Status: DISCONTINUED | OUTPATIENT
Start: 2023-10-03 | End: 2023-10-04

## 2023-10-03 RX ADMIN — DEXTROSE MONOHYDRATE 12.5 G: 25 INJECTION, SOLUTION INTRAVENOUS at 02:10

## 2023-10-03 RX ADMIN — METRONIDAZOLE 500 MG: 5 INJECTION, SOLUTION INTRAVENOUS at 03:10

## 2023-10-03 RX ADMIN — HEPARIN SODIUM 5000 UNITS: 5000 INJECTION INTRAVENOUS; SUBCUTANEOUS at 09:10

## 2023-10-03 RX ADMIN — PANTOPRAZOLE SODIUM 40 MG: 40 INJECTION, POWDER, LYOPHILIZED, FOR SOLUTION INTRAVENOUS at 09:10

## 2023-10-03 RX ADMIN — PANTOPRAZOLE SODIUM 40 MG: 40 INJECTION, POWDER, LYOPHILIZED, FOR SOLUTION INTRAVENOUS at 10:10

## 2023-10-03 RX ADMIN — DEXTROSE MONOHYDRATE 1 G: 5 INJECTION, SOLUTION INTRAVENOUS at 03:10

## 2023-10-03 RX ADMIN — HYDROMORPHONE HYDROCHLORIDE 1 MG: 1 INJECTION, SOLUTION INTRAMUSCULAR; INTRAVENOUS; SUBCUTANEOUS at 10:10

## 2023-10-03 RX ADMIN — MUPIROCIN 1 G: 20 OINTMENT TOPICAL at 09:10

## 2023-10-03 RX ADMIN — SODIUM BICARBONATE 50 MEQ: 84 INJECTION INTRAVENOUS at 12:10

## 2023-10-03 RX ADMIN — CALCIUM GLUCONATE 1 G: 20 INJECTION, SOLUTION INTRAVENOUS at 12:10

## 2023-10-03 RX ADMIN — CALCIUM GLUCONATE 1 G: 20 INJECTION, SOLUTION INTRAVENOUS at 02:10

## 2023-10-03 RX ADMIN — PROPOFOL 50 MCG/KG/MIN: 10 INJECTION, EMULSION INTRAVENOUS at 11:10

## 2023-10-03 RX ADMIN — IODIXANOL 100 ML: 320 INJECTION, SOLUTION INTRAVASCULAR at 01:10

## 2023-10-03 RX ADMIN — HYDRALAZINE HYDROCHLORIDE 10 MG: 20 INJECTION INTRAMUSCULAR; INTRAVENOUS at 09:10

## 2023-10-03 RX ADMIN — INSULIN HUMAN 15 UNITS: 100 INJECTION, SOLUTION PARENTERAL at 02:10

## 2023-10-03 RX ADMIN — PROPOFOL 50 MCG/KG/MIN: 10 INJECTION, EMULSION INTRAVENOUS at 05:10

## 2023-10-03 RX ADMIN — PROPOFOL 5 MCG/KG/MIN: 10 INJECTION, EMULSION INTRAVENOUS at 01:10

## 2023-10-03 RX ADMIN — ONDANSETRON 8 MG: 2 INJECTION INTRAMUSCULAR; INTRAVENOUS at 09:10

## 2023-10-03 RX ADMIN — HYDROMORPHONE HYDROCHLORIDE 1 MG: 1 INJECTION, SOLUTION INTRAMUSCULAR; INTRAVENOUS; SUBCUTANEOUS at 12:10

## 2023-10-03 RX ADMIN — ALBUTEROL SULFATE 10 MG/HR: 2.5 SOLUTION RESPIRATORY (INHALATION) at 02:10

## 2023-10-03 RX ADMIN — ALBUTEROL SULFATE 10 MG: 2.5 SOLUTION RESPIRATORY (INHALATION) at 01:10

## 2023-10-03 RX ADMIN — NALOXONE HYDROCHLORIDE: 0.4 INJECTION, SOLUTION INTRAMUSCULAR; INTRAVENOUS; SUBCUTANEOUS at 01:10

## 2023-10-03 NOTE — Clinical Note
Diagnosis: Epigastric pain [970499]   Future Attending Provider: MONI HOFFMAN [[id number]]   Place in Observation: [facility] [[id number]]   Admitting Provider:: MONI HOFFMAN [[id number]]

## 2023-10-03 NOTE — CONSULTS
Pulmonary/Critical Care Consult      PATIENT NAME: Tabby Howard  MRN: [id number]  TODAY'S DATE: 10/03/2023  1:15 PM  ADMIT DATE: 10/3/2023  AGE: 33 y.o. : 1990    CONSULT REQUESTED BY: Alexys Gonzalez MD    REASON FOR CONSULT:   Cardiac arrest     HPI:  Ms Howard is a 33 year old woman with chronic opioid seeker, ESRD, who presents with missed HD session, found to be hyperkalemia and experienced cardiac arrest while in the ED. Suspicion is respiratory arrest as she was seen to have become less responsive while getting a bedside ultrasound. ED physician noted that she appeared to be hypoventilating and was less responsive leading up to the arrest.       Review of Systems   Unable to perform ROS: Intubated           ALLERGIES  Review of patient's allergies indicates:   Allergen Reactions    Penicillins Hives       INPATIENT SCHEDULED MEDICATIONS   albuterol sulfate  10 mg Nebulization ED 1 Time    aluminum-magnesium hydroxide-simethicone  30 mL Oral Once    And    LIDOcaine HCl 2%  15 mL Oral Once    calcium gluconate IVPB  1 g Intravenous ED 1 Time    levoFLOXacin  500 mg Intravenous ED 1 Time    metronidazole  500 mg Intravenous ED 1 Time    naloxone          EPINEPHrine      propofoL         MEDICAL AND SURGICAL HISTORY  Past Medical History:   Diagnosis Date    ESRD on hemodialysis 2022    Gastritis 2022    EGD was 22    Gastroparesis 2022    has not had Emptying study    Heart failure with preserved ejection fraction 2022    EF 55% on 3/22    History of supraventricular tachycardia     Hyperkalemia 2022    Hypertensive emergency 2022    Sickle cell trait 2022    Type 2 diabetes mellitus      Past Surgical History:   Procedure Laterality Date     SECTION      x 3    COLONOSCOPY      COLONOSCOPY N/A 2022    Procedure: COLONOSCOPY;  Surgeon: Jagdeep Cedeno MD;  Location: [facility];  Service: Endoscopy;  Laterality: N/A;    ESOPHAGOGASTRODUODENOSCOPY  N/A 10/18/2019    Procedure: ESOPHAGOGASTRODUODENOSCOPY (EGD);  Surgeon: Gianluca Mendez MD;  Location: [facility] ENDO;  Service: Endoscopy;  Laterality: N/A;    ESOPHAGOGASTRODUODENOSCOPY N/A 08/24/2022    Procedure: EGD (ESOPHAGOGASTRODUODENOSCOPY);  Surgeon: Micky Paredes III, MD;  Location: [facility] ENDO;  Service: Endoscopy;  Laterality: N/A;    ESOPHAGOGASTRODUODENOSCOPY N/A 12/5/2022    Procedure: EGD (ESOPHAGOGASTRODUODENOSCOPY);  Surgeon: Marcelo Zhong MD;  Location: [facility] ENDO;  Service: Endoscopy;  Laterality: N/A;    INSERTION, CATHETER, TUNNELED N/A 6/17/2023    Procedure: Insertion,catheter,tunneled;  Surgeon: Carlos Thurman Jr., MD;  Location: [facility] OR;  Service: General;  Laterality: N/A;    LAPAROSCOPIC CHOLECYSTECTOMY N/A 07/30/2022    Procedure: CHOLECYSTECTOMY, LAPAROSCOPIC;  Surgeon: Grey Perez MD;  Location: [facility] OR;  Service: General;  Laterality: N/A;    PLACEMENT OF DUAL-LUMEN VASCULAR CATHETER Left 07/12/2022    Procedure: INSERTION-CATHETER-JOSEPH;  Surgeon: Dionte Gan MD;  Location: [facility] OR;  Service: General;  Laterality: Left;    PLACEMENT OF DUAL-LUMEN VASCULAR CATHETER Right 07/26/2022    Procedure: INSERTION-CATHETER-Hemosplit;  Surgeon: Dionte Gan MD;  Location: [facility] OR;  Service: General;  Laterality: Right;       ALCOHOL, TOBACCO AND DRUG USE  Social History     Tobacco Use   Smoking Status Never   Smokeless Tobacco Never     Social History     Substance and Sexual Activity   Alcohol Use Not Currently     Social History     Substance and Sexual Activity   Drug Use No       FAMILY HISTORY  Family History   Problem Relation Age of Onset    Diabetes Mother     Diabetes Father        VITAL SIGNS (MOST RECENT)  Temp: 98.2 °F (36.8 °C) (10/03/23 0903)  Pulse: 80 (10/03/23 1038)  Resp: 20 (10/03/23 1220)  BP: (!) 158/97 (10/03/23 1038)  SpO2: 100 % (10/03/23 1307)    INTAKE AND OUTPUT (LAST 24 HOURS):No intake or output data in the 24 hours ending 10/03/23  "1315    WEIGHT  Wt Readings from Last 1 Encounters:   10/03/23 53.1 kg (117 lb)       Physical Exam  Vitals reviewed.   Constitutional:       General: She is not in acute distress.     Appearance: She is well-developed. She is not diaphoretic.   HENT:      Head: Normocephalic and atraumatic.      Mouth/Throat:      Pharynx: No oropharyngeal exudate or posterior oropharyngeal erythema.   Eyes:      General: No scleral icterus.     Pupils: Pupils are equal, round, and reactive to light.   Neck:      Vascular: No JVD.   Cardiovascular:      Rate and Rhythm: Normal rate and regular rhythm.      Heart sounds: Normal heart sounds. No murmur heard.  Pulmonary:      Effort: Pulmonary effort is normal. No respiratory distress.      Breath sounds: Normal breath sounds. No wheezing.   Abdominal:      General: Bowel sounds are normal. There is no distension.      Palpations: Abdomen is soft.      Tenderness: There is no abdominal tenderness.   Musculoskeletal:         General: No swelling.      Cervical back: Normal range of motion and neck supple. No rigidity.   Skin:     General: Skin is warm and dry.      Capillary Refill: Capillary refill takes less than 2 seconds.      Coloration: Skin is not pale.      Findings: No rash.   Neurological:      General: No focal deficit present.      Mental Status: She is alert and oriented to person, place, and time.      Cranial Nerves: No cranial nerve deficit.      Motor: No weakness or abnormal muscle tone.           ACUTE PHASE REACTANT (LAST 24 HOURS)  No results for input(s): "FERRITIN", "CRP", "LDH", "DDIMER" in the last 24 hours.    CBC LAST (LAST 24 HOURS)  Recent Labs   Lab 10/03/23  1001   WBC 6.26   RBC 3.40*   HGB 10.3*   HCT 30.5*   MCV 90   MCH 30.3   MCHC 33.8   RDW 16.7*   PLT 95*   MPV 11.7   GRAN 79.5*  5.0   LYMPH 10.1*  0.6*   MONO 6.4  0.4   BASO 0.03   NRBC 0       CHEMISTRY LAST (LAST 24 HOURS)  Recent Labs   Lab 10/03/23  1001      K 5.7*   CL 99   CO2 26 "   ANIONGAP 12   BUN 46*   CREATININE 6.5*   *   CALCIUM 8.5*   MG 2.2   ALBUMIN 4.2   PROT 7.4   ALKPHOS 227*   *   *   BILITOT 1.3*       COAGULATION LAST (LAST 24 HOURS)  Recent Labs   Lab 10/03/23  1001   INR 0.9       CARDIAC PROFILE (LAST 24 HOURS)  Recent Labs   Lab 10/03/23  1001   BNP 3,572*   TROPONINIHS 37.9*       LAST 7 DAYS MICROBIOLOGY   Microbiology Results (last 7 days)       Procedure Component Value Units Date/Time    Culture, Respiratory with Gram Stain [[id number]]     Order Status: No result Specimen: Respiratory from Tracheal Aspirate             MOST RECENT IMAGING  X-Ray Chest AP Portable  CLINICAL HISTORY:  33 years (1990) Female code [id number]    TECHNIQUE:  Portable AP radiograph the chest. One view.    COMPARISON:  Radiograph from October 13, 2023.    FINDINGS:  Moderate diffuse interstitial opacity in both lungs with a faint airspace opacity in the retrocardiac left lower lobe, worse from the previous exam. There is blunting of the left costophrenic angle consistent with a trace pleural effusion. No pneumothorax is identified. Moderate cardiopericardial silhouette enlargement. Osseous structures appear unchanged. There are clips in the right upper abdominal quadrant consistent with prior biliary surgery.    Lines and tubes: Interval intubation with an endotracheal tube projecting 2 cm from the brea. Interval placement of an enteric tube with tip below the field of view (subdiaphragmatic). Right-sided IJ dual lumen split tip central venous catheter with distal tip at the level of the right atria.    IMPRESSION:  1. Interval intubation with an endotracheal tube projecting 2 cm from the brea.  2. Slight interval worsening mixed interstitial and alveolar opacities in both lungs suggesting moderate ovarian edema.  3. Interval placement of an territory tube with tip projecting below the field of view (likely within the stomach)    .    Electronically signed by:   Deandre Shirley MD  10/03/2023 01:05 PM CDT Workstation: [id number]  US Abdomen Limited  Reason: pain    COMPARISON: CT dated 9/19/2023    FINDINGS: There is a small right pleural effusion.  The liver is enlarged measuring 19.2 cm. There is no focal mass or biliary duct dilatation. There is trace perihepatic fluid.  Gallbladder is absent. The common bile duct measures 3 mm.  The right kidney is normal in size without hydronephrosis.    IMPRESSION: Hepatomegaly with trace fluid around the tip of the right lobe of the liver    Small right pleural effusion    Prior cholecystectomy    Electronically signed by:  Rae Solano MD  10/03/2023 12:55 PM CDT Workstation: [id number]  X-Ray Chest AP Portable  XR CHEST 1 VIEW    CLINICAL HISTORY:  33 years Female CHF    COMPARISON: September 19, 2023    FINDINGS: Cardiopericardial silhouette is enlarged. Right IJ dual-lumen central venous line is in place with distal tip overlying the cavoatrial junction. Mild increased interstitial markings bilaterally and fissural thickening suggestive of trace pleural fluid. Minimal blunting of left costophrenic angle. No pneumothorax. No acute osseous abnormality.    IMPRESSION:    Cardiomegaly with findings of mild interstitial edema/volume overload.    Electronically signed by:  Von Bland MD  10/03/2023 09:38 AM CDT Workstation: [id number]      CURRENT VISIT EKG  Results for orders placed or performed during the hospital encounter of 10/03/23   EKG 12-lead    Narrative    Test Reason : R06.02,    Vent. Rate : 083 BPM     Atrial Rate : 083 BPM     P-R Int : 184 ms          QRS Dur : 084 ms      QT Int : 440 ms       P-R-T Axes : 028 -34 080 degrees     QTc Int : 517 ms    Normal sinus rhythm  Left axis deviation  Nonspecific ST abnormality  Prolonged QT  Abnormal ECG  When compared with ECG of 19-SEP-2023 13:17,  Nonspecific T wave abnormality no longer evident in Anterior leads    Referred By:  SELF           Confirmed By:   "      ECHOCARDIOGRAM RESULTS  Results for orders placed during the hospital encounter of 09/10/23    Echo    Interpretation Summary    Left Ventricle: The left ventricle is normal in size. Mildly increased ventricular mass. Mildly increased wall thickness. Normal wall motion. There is low normal systolic function with a visually estimated ejection fraction of 50 - 55%. There is normal diastolic function.    Right Ventricle: Normal right ventricular cavity size. Wall thickness is normal. Right ventricle wall motion  is normal. Systolic function is normal.    IVC/SVC: Normal venous pressure at 3 mmHg.    Pericardium: There is a small circumferential effusion. Pericardial effusion is echolucent. No indication of cardiac tamponade. Evidence includes no chamber collapse.        VENTILATOR INFORMATION  Vent Mode: A/C  Oxygen Concentration (%):  [100] 100  Resp Rate Total:  [0 br/min] 0 br/min  Vt Set:  [350 mL] 350 mL  PEEP/CPAP:  [5 cmH20] 5 cmH20  Pressure Support:  [0 cmH20] 0 cmH20  Mean Airway Pressure:  [0 cmH20] 0 cmH20           LAST ARTERIAL BLOOD GAS  ABG  No results for input(s): "PH", "PO2", "PCO2", "HCO3", "BE" in the last 168 hours.        ASSESSMENT:   ESRD   Hyperkalemia   Cardiac arrest- suspect respiratory arrest   Retrocardiac consolidation     Ms Howard is a 33 year old woman who presented with missed dialysis session, associated hyperkalemia complicated by cardiac arrest.     Chest imaging suggests bilateral nodular ggo likely due to pulmonary edema. Enlarged cardiac silloute. Interestingly there are air bronchograms- which could suggest a retrocardiac consolidation. CT chest suggests bilateral GGO which are suggestive of pulmonary edema and a chronic appearing pericardial effusion.     PLAN:   - Nephrology consultation for dialysis   - Keep low tidal volume about 6 cc/kg of IBW. Continue tidal volume around 350 cc. Repeat ABG in 2 hours.   - Keep sedated for now-  will plan on dialysis and will do SAT " tomorrow morning   - Obtain ECHO   - Continue antibiotics- I think this is probably mostly fluid related to pulmonary edema, but consolidations could represent pneumonia- would continue this for now and we can reassess based on response.   - Continue heparin ppx- since no active bleeding.     Will continue to follow. Please call with questions.       Lobito Eli MD  Date of Service: 10/03/2023  1:15 PM  [phone]     Upon my evaluation, this patient had a high probability of imminent or life-threatening deterioration, which required my direct attention, intervention, and personal management.    I have personally provided at least 35 minutes of critical care time exclusive of time spent on separately billable procedures. Over 50% of the time of this encounter was spent in direct care at the bedside. Time includes review of laboratory data, radiology results, discussion with consultants, and monitoring for potential decompensation. Interventions were performed as documented above.

## 2023-10-03 NOTE — PLAN OF CARE
Problem: Adult Inpatient Plan of Care  Goal: Plan of Care Review  Outcome: Ongoing, Progressing  Goal: Patient-Specific Goal (Individualized)  Outcome: Ongoing, Progressing  Goal: Absence of Hospital-Acquired Illness or Injury  Outcome: Ongoing, Progressing  Goal: Optimal Comfort and Wellbeing  Outcome: Ongoing, Progressing  Goal: Readiness for Transition of Care  Outcome: Ongoing, Progressing     Problem: Device-Related Complication Risk (Hemodialysis)  Goal: Safe, Effective Therapy Delivery  Outcome: Ongoing, Progressing     Problem: Hemodynamic Instability (Hemodialysis)  Goal: Effective Tissue Perfusion  Outcome: Ongoing, Progressing     Problem: Infection (Hemodialysis)  Goal: Absence of Infection Signs and Symptoms  Outcome: Ongoing, Progressing     Problem: Diabetes Comorbidity  Goal: Blood Glucose Level Within Targeted Range  Outcome: Ongoing, Progressing     Problem: Infection  Goal: Absence of Infection Signs and Symptoms  Outcome: Ongoing, Progressing     Problem: Impaired Wound Healing  Goal: Optimal Wound Healing  Outcome: Ongoing, Progressing     Problem: Communication Impairment (Mechanical Ventilation, Invasive)  Goal: Effective Communication  Outcome: Ongoing, Progressing     Problem: Device-Related Complication Risk (Mechanical Ventilation, Invasive)  Goal: Optimal Device Function  Outcome: Ongoing, Progressing     Problem: Inability to Wean (Mechanical Ventilation, Invasive)  Goal: Mechanical Ventilation Liberation  Outcome: Ongoing, Progressing     Problem: Nutrition Impairment (Mechanical Ventilation, Invasive)  Goal: Optimal Nutrition Delivery  Outcome: Ongoing, Progressing     Problem: Skin and Tissue Injury (Mechanical Ventilation, Invasive)  Goal: Absence of Device-Related Skin and Tissue Injury  Outcome: Ongoing, Progressing     Problem: Ventilator-Induced Lung Injury (Mechanical Ventilation, Invasive)  Goal: Absence of Ventilator-Induced Lung Injury  Outcome: Ongoing, Progressing      Problem: Communication Impairment (Artificial Airway)  Goal: Effective Communication  Outcome: Ongoing, Progressing     Problem: Device-Related Complication Risk (Artificial Airway)  Goal: Optimal Device Function  Outcome: Ongoing, Progressing     Problem: Skin and Tissue Injury (Artificial Airway)  Goal: Absence of Device-Related Skin or Tissue Injury  Outcome: Ongoing, Progressing     Problem: Noninvasive Ventilation Acute  Goal: Effective Unassisted Ventilation and Oxygenation  Outcome: Ongoing, Progressing     Problem: Fall Injury Risk  Goal: Absence of Fall and Fall-Related Injury  Outcome: Ongoing, Progressing     Problem: Restraint, Nonbehavioral (Nonviolent)  Goal: Absence of Harm or Injury  Outcome: Ongoing, Progressing     Problem: Skin Injury Risk Increased  Goal: Skin Health and Integrity  Outcome: Ongoing, Progressing

## 2023-10-03 NOTE — H&P
[facility] - Emergency Dept    History & Physical      Patient Name: Tabby Howard  MRN: [id number]  Admission Date: 10/3/2023  Attending Physician: Alexys Gonzalez MD   Primary Care Provider: Melony Kim NP         Patient information was obtained from patient, past medical records, and ER records.     Subjective:     Principal Problem:Cardiac arrest    Chief Complaint:   Chief Complaint   Patient presents with    Abdominal Pain     Since last night. 10/10. C/O vomiting as well.         HPI:  33-year-old female history of end-stage renal disease on dialysis Tuesday Thursday Saturday, gastritis diagnosed by EGD December 2022, gastroparesis, DVT, suspected narcotic bowel syndrome, hypotension, type 2 diabetes.  Patient presents to the hospital complaining of pain starting last night, 10 of 10, associated with nausea and vomiting.  Patient's has had several presentations in the past for similar pain, mostly slowly most recently discharged September 2nd for hospitalization regarding abdominal pain.  She has frequent admissions and according to prior providers note from last admission in 2023 alone she is had 17 inpatient admissions, 16 CT abdomen pelvis, 3 CTA of the chest, 22 chest x-rays, 6 KUB.     When I went to evaluate the patient, patient was undergoing rapid quadrant ultrasound for elevated LFTs.  I know she was not responding to the sonographer when asked to take a deep inspiration.  She was not responsive to aggressive stimulation.  She did have a pulse which palpable and then stopped respirations, then began taking labored respirations and then stopped breathing entirely, lost pulse. Code blue was called, received 1epi, 2mg narcan, improved BP. She was intubated. No neurologic response post arrest but was paralyzed for intubation. She will go to [facility],     Past Medical History:   Diagnosis Date    ESRD on hemodialysis 04/12/2022    Gastritis 12/2022    EGD was 12/5/22     Gastroparesis 2022    has not had Emptying study    Heart failure with preserved ejection fraction 2022    EF 55% on 3/22    History of supraventricular tachycardia     Hyperkalemia 2022    Hypertensive emergency 2022    Sickle cell trait 2022    Type 2 diabetes mellitus        Past Surgical History:   Procedure Laterality Date     SECTION      x 3    COLONOSCOPY      COLONOSCOPY N/A 2022    Procedure: COLONOSCOPY;  Surgeon: Jagdeep Cedeno MD;  Location: [facility];  Service: Endoscopy;  Laterality: N/A;    ESOPHAGOGASTRODUODENOSCOPY N/A 10/18/2019    Procedure: ESOPHAGOGASTRODUODENOSCOPY (EGD);  Surgeon: Gianluca Mendez MD;  Location: [facility];  Service: Endoscopy;  Laterality: N/A;    ESOPHAGOGASTRODUODENOSCOPY N/A 2022    Procedure: EGD (ESOPHAGOGASTRODUODENOSCOPY);  Surgeon: Micky Paredes III, MD;  Location: [facility];  Service: Endoscopy;  Laterality: N/A;    ESOPHAGOGASTRODUODENOSCOPY N/A 2022    Procedure: EGD (ESOPHAGOGASTRODUODENOSCOPY);  Surgeon: Marcelo Zhong MD;  Location: [facility] ENDO;  Service: Endoscopy;  Laterality: N/A;    INSERTION, CATHETER, TUNNELED N/A 2023    Procedure: Insertion,catheter,tunneled;  Surgeon: Carlos Thurman Jr., MD;  Location: [facility] OR;  Service: General;  Laterality: N/A;    LAPAROSCOPIC CHOLECYSTECTOMY N/A 2022    Procedure: CHOLECYSTECTOMY, LAPAROSCOPIC;  Surgeon: Grey Perez MD;  Location: [facility] OR;  Service: General;  Laterality: N/A;    PLACEMENT OF DUAL-LUMEN VASCULAR CATHETER Left 2022    Procedure: INSERTION-CATHETER-JOSEPH;  Surgeon: Dionte Gan MD;  Location: [facility] OR;  Service: General;  Laterality: Left;    PLACEMENT OF DUAL-LUMEN VASCULAR CATHETER Right 2022    Procedure: INSERTION-CATHETER-Hemosplit;  Surgeon: Dionte Gan MD;  Location: [facility] OR;  Service: General;  Laterality: Right;       Review of patient's allergies indicates:   Allergen Reactions     Penicillins Hives       No current facility-administered medications on file prior to encounter.     Current Outpatient Medications on File Prior to Encounter   Medication Sig    amLODIPine (NORVASC) 5 MG tablet Take 1 tablet (5 mg total) by mouth once daily.    apixaban (ELIQUIS) 5 mg Tab Take 1 tablet (5 mg total) by mouth 2 (two) times daily.    carvediloL (COREG) 25 MG tablet Take 1 tablet (25 mg total) by mouth 2 (two) times daily.    cetirizine (ZYRTEC) 10 MG tablet Take 1 tablet every day by oral route.    FLUoxetine 20 MG capsule Take 1 capsule (20 mg total) by mouth once daily.    fluticasone propionate (FLONASE) 50 mcg/actuation nasal spray [place] 1 spray every day by intranasal route.    gabapentin (NEURONTIN) 300 MG capsule Take 2 capsules twice a day by oral route for 90 days. (Patient taking differently: Take 600 mg by mouth 2 (two) times daily.)    hydrALAZINE (APRESOLINE) 100 MG tablet Take 1 tablet (100 mg total) by mouth 2 (two) times a day.    insulin aspart U-100 (NOVOLOG FLEXPEN U-100 INSULIN) 100 unit/mL (3 mL) InPn pen Inject 8 units 3 times a day by subcutaneous route before meals (Patient taking differently: Inject 8 Units into the skin 3 (three) times daily before meals.)    insulin detemir U-100, Levemir, (LEVEMIR FLEXTOUCH U100 INSULIN) 100 unit/mL (3 mL) InPn pen Inject 18 units every day by subcutaneous route in the evening    insulin detemir U-100, Levemir, (LEVEMIR FLEXTOUCH U100 INSULIN) 100 unit/mL (3 mL) InPn pen Inject 21 units every day by subcutaneous route in the evening for 90 days.    isosorbide mononitrate (IMDUR) 30 MG 24 hr tablet Take 1 tablet (30 mg total) by mouth once daily.    lancets 33 gauge Misc Use to test twice daily    levETIRAcetam (KEPPRA) 500 MG Tab Take 1 tablet twice a day by oral route for 30 days. (Patient taking differently: Take 500 mg by mouth 2 (two) times daily.)    ondansetron (ZOFRAN-ODT) 4 MG TbDL Place 1 tablet (4 mg total) under the tongue every  "8 (eight) hours.    ondansetron (ZOFRAN-ODT) 4 MG TbDL Take 1 tablet (4 mg total) by mouth every 8 (eight) hours as needed.    pantoprazole (PROTONIX) 40 MG tablet Take 1 tablet (40 mg total) by mouth once daily.    pen needle, diabetic 31 gauge x 3/16" Ndle Use as directed to inject insulin 5 times daily    promethazine (PHENERGAN) 25 MG suppository Place 1 suppository (25 mg total) rectally every 6 (six) hours as needed for Nausea.    sucroferric oxyhydroxide (VELPHORO) 500 mg Chew Take 1 tablet 3 times a day    [DISCONTINUED] atenoloL (TENORMIN) 50 MG tablet Take 1 tablet (50 mg total) by mouth every other day.    [DISCONTINUED] omeprazole (PRILOSEC) 20 MG capsule Take 2 capsules (40 mg total) by mouth once daily. for 10 days     Family History       Problem Relation (Age of Onset)    Diabetes Mother, Father          Tobacco Use    Smoking status: Never    Smokeless tobacco: Never   Substance and Sexual Activity    Alcohol use: Not Currently    Drug use: No    Sexual activity: Not Currently     Partners: Male     Birth control/protection: I.U.D.     Review of Systems  Objective:     Vital Signs (Most Recent):  Temp: 98.2 °F (36.8 °C) (10/03/23 0903)  Pulse: 80 (10/03/23 1038)  Resp: 20 (10/03/23 1220)  BP: (!) 158/97 (10/03/23 1038)  SpO2: 98 % (10/03/23 1038) Vital Signs (24h Range):  Temp:  [98.2 °F (36.8 °C)] 98.2 °F (36.8 °C)  Pulse:  [80-84] 80  Resp:  [20] 20  SpO2:  [95 %-98 %] 98 %  BP: (152-166)/() 158/97     Weight: 53.1 kg (117 lb)  Body mass index is 21.4 kg/m² (pended).    Physical Exam  Constitutional:       Comments: unresponsive   HENT:      Right Ear: External ear normal.      Left Ear: External ear normal.      Nose: Nose normal.      Mouth/Throat:      Mouth: Mucous membranes are moist.   Eyes:      Comments: midpoint   Cardiovascular:      Rate and Rhythm: Normal rate and regular rhythm.      Pulses: Normal pulses.      Heart sounds: Normal heart sounds.   Pulmonary:      Effort: " Pulmonary effort is normal.      Breath sounds: Normal breath sounds.   Abdominal:      General: Abdomen is flat.      Palpations: Abdomen is soft.   Neurological:      Comments: Not responsive to physical stimulus after intubation, pupils midpoint            Significant Labs: All pertinent labs within the past 24 hours have been reviewed.    Significant Imaging: I have reviewed all pertinent imaging results/findings within the past 24 hours.    Assessment/Plan:     Active Diagnoses:    Diagnosis Date Noted POA    PRINCIPAL PROBLEM:  Cardiac arrest [I46.9] 10/03/2023 Unknown      Problems Resolved During this Admission:     VTE Risk Mitigation (From admission, onward)           Ordered     IP VTE HIGH RISK PATIENT  Once         10/03/23 1220     Place sequential compression device  Until discontinued         10/03/23 1220                     Abdominal pain-POA  Pt again getting admitted with abdominal  pain   Ddx from prior providers include narcotic bowel syndrome in the background of pain med seeking behavior, gastroparesis, and gastritis (prior EGD with this)  Abdomen is soft, no guarding  GI  csx, CT abd/p  PPI    Cardiac arrest  Consider Hs/Ts: Hypovolemia, Hypoxia, Hydrogen ion (acidosis), Hyper-/hypokalemia, Hypoglycemia, Hypothermia. Toxins, Tamponade(cardiac),Tension pneumothorax, Thrombosis (coronary and pulmonary), and Trauma  -suspect respiratory arrest since her breathing was minimal preceding the arrest  ACLS for 1 round CPR, no post arrest purposeful movement but was paralyzed  P/CC consult  ABG after 30 minutes  ICU monitoring  Neuro checks  hypothermia if no immediate purposeful behavior    Hyperkalemia-was given CaGlu and bicarb, plan for HD            Frequent hospital admissions  In 2023 alone  She had 17 IP admissions  16 CT abdomen/pelvis  CTA chest x 3  22 CXR  6 KUB           Type 2 diabetes mellitus with hyperglycemia, with long-term current use of insulin, previous HbA1c 5.8%  Check a1c,  SSI        Suspected Opioid use disorder        Deep vein thrombosis (DVT) of LAUREL  Was on eliquis, was on hold prior admission, she resumed this          ESRD (end stage renal disease)\ on HD TTS    PMHx: Seizure d/o, pericardial effusion, sickle cell trait    Critical care time 40 min       Alexys Gonzalez MD  Department of Hospital Medicine   [facility] - Emergency Dept

## 2023-10-03 NOTE — PROGRESS NOTES
3000 ml net uf removed   10/03/23 1854   Required for all Hemodialysis Patients   Hepatitis Status negative   Handoff Report   Received From Dahlia   Given To Danii   Treatment Type   Treatment Type Maintenance   Vital Signs   Temp 97.3 °F (36.3 °C)   Pulse 79   Resp (!) 24   SpO2 99 %   BP (!) 146/87   MAP (mmHg) 112   Assessments (Pre/Post)   Safety received with restraints intact   Date Hepatitis Profile Obtained 09/19/23   Blood Liters Processed (BLP) 57.6   Transport Modality not applicable   Level of Consciousness (AVPU) responds to voice   Dialyzer Clearance mildly streaked   Pain   Preferred Pain Scale rFLACC (Revised Face Legs Arms Cry Consolability Scale)   Pain Rating (0-10): Rest 0        Hemodialysis Catheter 06/17/23 1135 right internal jugular   Placement Date/Time: 06/17/23 1135   Present Prior to Hospital Arrival?: No  Hand Hygiene: Performed  Barrier Precautions: Performed  Skin Antisepsis: ChloraPrep  Hemodialysis Catheter Type: Tunneled catheter  Location: right internal jugular  Cathete...   Line Necessity Review CRRT/HD   Site Assessment No drainage;No redness;No swelling;No warmth   Line Securement Device Secured with sutures   Dressing Type CHG impregnated dressing/sponge;Central line dressing   Dressing Status Clean;Dry;Intact   Dressing Intervention Integrity maintained   Date on Dressing 10/03/23   Dressing Due to be Changed 10/09/23   Venous Patency/Care flushed w/o difficulty;normal saline locked   Arterial Patency/Care flushed w/o difficulty;normal saline locked   Post-Hemodialysis Assessment   Rinseback Volume (mL) 250 mL   Blood Volume Processed (Liters) 57.5 L   Dialyzer Clearance Lightly streaked   Additional Fluid Intake (mL) 180 mL   Total UF (mL) 3500 mL   Net Fluid Removal 3000   Patient Response to Treatment tolerated well   Post-Treatment Weight 57.5 kg (126 lb 12.2 oz)   Treatment Weight Change -2.8   Post-Hemodialysis Comments tx completed   Edema   Edema generalized      Unable to educate due to sedation

## 2023-10-03 NOTE — NURSING
Nurses Note -- 4 Eyes      10/3/2023   3:50 PM      Skin assessed during: Admit      [x] No Altered Skin Integrity Present    []Prevention Measures Documented      [] Yes- Altered Skin Integrity Present or Discovered   [] LDA Added if Not in Epic (Describe Wound)   [] New Altered Skin Integrity was Present on Admit and Documented in LDA   [] Wound Image Taken    Wound Care Consulted? No    Attending Nurse:  Danii Woo RN/Staff Member:   Finn

## 2023-10-03 NOTE — CONSULTS
INPATIENT NEPHROLOGY CONSULT   [facility] NEPHROLOGY    Tabby Howard  10/03/2023    Reason for consultation:    esrd    Chief Complaint:   Chief Complaint   Patient presents with    Abdominal Pain     Since last night. 10/10. C/O vomiting as well.           History of Present Illness:    Per H and P  33-year-old female history of end-stage renal disease on dialysis Tuesday Thursday Saturday, gastritis diagnosed by EGD December 2022, gastroparesis, DVT, suspected narcotic bowel syndrome, hypotension, type 2 diabetes.  Patient presents to the hospital complaining of pain starting last night, 10 of 10, associated with nausea and vomiting.  Patient's has had several presentations in the past for similar pain, mostly slowly most recently discharged September 2nd for hospitalization regarding abdominal pain.  She has frequent admissions and according to prior providers note from last admission in 2023 alone she is had 17 inpatient admissions, 16 CT abdomen pelvis, 3 CTA of the chest, 22 chest x-rays, 6 KUB.      When I went to evaluate the patient, patient was undergoing rapid quadrant ultrasound for elevated LFTs.  I know she was not responding to the sonographer when asked to take a deep inspiration.  She was not responsive to aggressive stimulation.  She did have a pulse which palpable and then stopped respirations, then began taking labored respirations and then stopped breathing entirely, lost pulse. Code blue was called, received 1epi, 2mg narcan, improved BP. She was intubated. No neurologic response post arrest but was paralyzed for intubation. She will go to [facility],     10/3  intubated.  No distress.   Patient seen on hemodialysis for uremic clearance and ultrafiltration.        Plan of Care:       Assessment:    esrd  --continue dialysis per routine  --fluid restrict  --renal dose medication per routine  --continue outpt medication  --continue binders with meals    Anemia  --erythropoiesis stimulating agent  with renal replacement therapy when bp better controlled    Hyperphosphatemia  --renal diet  --continue binders    Hypertension  --uf with hd  --fluid restrict  --low salt diet  --continue home medication    Hyperkalemia  --2 k bath  --low k diet           Thank you for allowing us to participate in this patient's care. We will continue to follow.    Vital Signs:  Temp Readings from Last 3 Encounters:   10/03/23 98.2 °F (36.8 °C) (Oral)   23 98.4 °F (36.9 °C) (Oral)   23 97.6 °F (36.4 °C) (Oral)       Pulse Readings from Last 3 Encounters:   10/03/23 79   23 84   23 92       BP Readings from Last 3 Encounters:   10/03/23 (!) 158/97   23 105/77   23 (!) 215/134       Weight:  Wt Readings from Last 3 Encounters:   10/03/23 53.1 kg (117 lb)   23 48.9 kg (107 lb 12.9 oz)   23 53.1 kg (117 lb)       Past Medical & Surgical History:  Past Medical History:   Diagnosis Date    ESRD on hemodialysis 2022    Gastritis 2022    EGD was 21    Gastroparesis 2022    has not had Emptying study    Heart failure with preserved ejection fraction 2022    EF 55% on 3/22    History of supraventricular tachycardia     Hyperkalemia 2022    Hypertensive emergency 2022    Sickle cell trait 2022    Type 2 diabetes mellitus        Past Surgical History:   Procedure Laterality Date     SECTION      x 3    COLONOSCOPY      COLONOSCOPY N/A 2022    Procedure: COLONOSCOPY;  Surgeon: Jagdeep Cedeno MD;  Location: [facility];  Service: Endoscopy;  Laterality: N/A;    ESOPHAGOGASTRODUODENOSCOPY N/A 10/18/2019    Procedure: ESOPHAGOGASTRODUODENOSCOPY (EGD);  Surgeon: Gianluca Mendez MD;  Location: [facility];  Service: Endoscopy;  Laterality: N/A;    ESOPHAGOGASTRODUODENOSCOPY N/A 2022    Procedure: EGD (ESOPHAGOGASTRODUODENOSCOPY);  Surgeon: Micky Paerdes III, MD;  Location: [facility];  Service: Endoscopy;  Laterality: N/A;     ESOPHAGOGASTRODUODENOSCOPY N/A 12/5/2022    Procedure: EGD (ESOPHAGOGASTRODUODENOSCOPY);  Surgeon: Marcelo Zhong MD;  Location: [facility] ENDO;  Service: Endoscopy;  Laterality: N/A;    INSERTION, CATHETER, TUNNELED N/A 6/17/2023    Procedure: Insertion,catheter,tunneled;  Surgeon: Carlos Thurman Jr., MD;  Location: [facility] OR;  Service: General;  Laterality: N/A;    LAPAROSCOPIC CHOLECYSTECTOMY N/A 07/30/2022    Procedure: CHOLECYSTECTOMY, LAPAROSCOPIC;  Surgeon: Grey Perez MD;  Location: [facility] OR;  Service: General;  Laterality: N/A;    PLACEMENT OF DUAL-LUMEN VASCULAR CATHETER Left 07/12/2022    Procedure: INSERTION-CATHETER-JOSEPH;  Surgeon: Dionte Gan MD;  Location: [facility] OR;  Service: General;  Laterality: Left;    PLACEMENT OF DUAL-LUMEN VASCULAR CATHETER Right 07/26/2022    Procedure: INSERTION-CATHETER-Hemosplit;  Surgeon: Dionte Gan MD;  Location: [facility] OR;  Service: General;  Laterality: Right;       Past Social History:  Social History     Socioeconomic History    Marital status:    Tobacco Use    Smoking status: Never    Smokeless tobacco: Never   Substance and Sexual Activity    Alcohol use: Not Currently    Drug use: No    Sexual activity: Not Currently     Partners: Male     Birth control/protection: I.U.D.     Social Determinants of Health     Financial Resource Strain: Low Risk  (5/16/2023)    Overall Financial Resource Strain (CARDIA)     Difficulty of Paying Living Expenses: Not very hard   Food Insecurity: No Food Insecurity (5/16/2023)    Hunger Vital Sign     Worried About Running Out of Food in the Last Year: Never true     Ran Out of Food in the Last Year: Never true   Transportation Needs: No Transportation Needs (5/16/2023)    PRAPARE - Transportation     Lack of Transportation (Medical): No     Lack of Transportation (Non-Medical): No   Physical Activity: Inactive (5/16/2023)    Exercise Vital Sign     Days of Exercise per Week: 0 days     Minutes of Exercise per  Session: 0 min   Stress: No Stress Concern Present (5/16/2023)    [employer] [place] [employer] - Occupational Stress Questionnaire     Feeling of Stress : Not at all   Social Connections: Unknown (5/16/2023)    Social Connection and Isolation Panel [NHANES]     Frequency of Communication with Friends and Family: More than three times a week     Frequency of Social Gatherings with Friends and Family: More than three times a week     Attends [employer] Services: Never     Active Member of Clubs or Organizations: No     Attends Club or Organization Meetings: Never     Marital Status: Patient refused   Housing Stability: Low Risk  (5/16/2023)    Housing Stability Vital Sign     Unable to Pay for Housing in the Last Year: No     Number of Places Lived in the Last Year: 1     Unstable Housing in the Last Year: No       Medications:  No current facility-administered medications on file prior to encounter.     Current Outpatient Medications on File Prior to Encounter   Medication Sig Dispense Refill    amLODIPine (NORVASC) 5 MG tablet Take 1 tablet (5 mg total) by mouth once daily. 30 tablet 1    apixaban (ELIQUIS) 5 mg Tab Take 1 tablet (5 mg total) by mouth 2 (two) times daily. 60 tablet 2    carvediloL (COREG) 25 MG tablet Take 1 tablet (25 mg total) by mouth 2 (two) times daily. 60 tablet 5    cetirizine (ZYRTEC) 10 MG tablet Take 1 tablet every day by oral route. 30 tablet 0    FLUoxetine 20 MG capsule Take 1 capsule (20 mg total) by mouth once daily. 30 capsule 5    fluticasone propionate (FLONASE) 50 mcg/actuation nasal spray [place] 1 spray every day by intranasal route. 16 g 0    gabapentin (NEURONTIN) 300 MG capsule Take 2 capsules twice a day by oral route for 90 days. (Patient taking differently: Take 600 mg by mouth 2 (two) times daily.) 360 capsule 1    hydrALAZINE (APRESOLINE) 100 MG tablet Take 1 tablet (100 mg total) by mouth 2 (two) times a day. 60 tablet 2    insulin aspart U-100 (NOVOLOG FLEXPEN  "U-100 INSULIN) 100 unit/mL (3 mL) InPn pen Inject 8 units 3 times a day by subcutaneous route before meals (Patient taking differently: Inject 8 Units into the skin 3 (three) times daily before meals.) 24 mL 1    insulin detemir U-100, Levemir, (LEVEMIR FLEXTOUCH U100 INSULIN) 100 unit/mL (3 mL) InPn pen Inject 18 units every day by subcutaneous route in the evening 18 mL 1    insulin detemir U-100, Levemir, (LEVEMIR FLEXTOUCH U100 INSULIN) 100 unit/mL (3 mL) InPn pen Inject 21 units every day by subcutaneous route in the evening for 90 days. 15 mL 1    isosorbide mononitrate (IMDUR) 30 MG 24 hr tablet Take 1 tablet (30 mg total) by mouth once daily. 30 tablet 1    lancets 33 gauge Misc Use to test twice daily 100 each 5    levETIRAcetam (KEPPRA) 500 MG Tab Take 1 tablet twice a day by oral route for 30 days. (Patient taking differently: Take 500 mg by mouth 2 (two) times daily.) 60 tablet 2    ondansetron (ZOFRAN-ODT) 4 MG TbDL Place 1 tablet (4 mg total) under the tongue every 8 (eight) hours. 24 tablet 2    pantoprazole (PROTONIX) 40 MG tablet Take 1 tablet (40 mg total) by mouth once daily.      pen needle, diabetic 31 gauge x 3/16" Ndle Use as directed to inject insulin 5 times daily 100 each 11    promethazine (PHENERGAN) 25 MG suppository Place 1 suppository (25 mg total) rectally every 6 (six) hours as needed for Nausea. 10 suppository 0    sucroferric oxyhydroxide (VELPHORO) 500 mg Chew Take 1 tablet 3 times a day 90 tablet 6    [DISCONTINUED] atenoloL (TENORMIN) 50 MG tablet Take 1 tablet (50 mg total) by mouth every other day. 45 tablet 3    [DISCONTINUED] omeprazole (PRILOSEC) 20 MG capsule Take 2 capsules (40 mg total) by mouth once daily. for 10 days 20 capsule 0    [DISCONTINUED] ondansetron (ZOFRAN-ODT) 4 MG TbDL Take 1 tablet (4 mg total) by mouth every 8 (eight) hours as needed. 24 tablet 2     Scheduled Meds:   aluminum-magnesium hydroxide-simethicone  30 mL Oral Once    And    LIDOcaine HCl 2%  " "15 mL Oral Once    levoFLOXacin  500 mg Intravenous ED 1 Time    metronidazole  500 mg Intravenous ED 1 Time    mupirocin   Nasal BID    naloxone         Continuous Infusions:   EPINEPHrine      propofoL       PRN Meds:.dextrose 50%, dextrose 50%, glucagon (human recombinant), glucose, glucose, iohexoL, naloxone, naloxone, prochlorperazine, sodium chloride 0.9%    Allergies:  Penicillins    Past Family History:  Reviewed; refer to Hospitalist Admission Note    Review of Systems:  Review of Systems - All 14 systems reviewed and negative, except as noted in HPI    Physical Exam:    BP (!) 158/97   Pulse 79   Temp 98.2 °F (36.8 °C) (Oral)   Resp 15   Ht 5' 2" (1.575 m)   Wt 53.1 kg (117 lb)   SpO2 100%   BMI 21.40 kg/m²     General Appearance:    intubated   Head:    Normocephalic, without obvious abnormality, atraumatic   Eyes:    PER, conjunctiva/corneas clear, EOM's intact in both eyes        Throat:   Lips, mucosa, and tongue normal; teeth and gums normal   Back:     Symmetric, no curvature, ROM normal, no CVA tenderness   Lungs:     Clear to auscultation bilaterally, respirations unlabored   Chest wall:    No tenderness or deformity   Heart:    Regular rate and rhythm, S1 and S2 normal, no murmur, rub   or gallop   Abdomen:     Soft, non-tender, bowel sounds active all four quadrants,     no masses, no organomegaly   Extremities:   Extremities normal, atraumatic, no cyanosis or edema   Pulses:   2+ and symmetric all extremities   MSK:   No joint or muscle swelling, tenderness or deformity   Skin:   Skin color, texture, turgor normal, no rashes or lesions   Neurologic:   intubated     Results:  Lab Results   Component Value Date     10/03/2023    K 5.7 (H) 10/03/2023    CL 99 10/03/2023    CO2 26 10/03/2023    BUN 46 (H) 10/03/2023    CREATININE 6.5 (H) 10/03/2023    CALCIUM 8.5 (L) 10/03/2023    ANIONGAP 12 10/03/2023    ESTGFRAFRICA 19 (L) 09/03/2022    EGFRNONAA 18 (A) 07/31/2022       Lab Results "   Component Value Date    CALCIUM 8.5 (L) 10/03/2023    PHOS 2.9 09/17/2023       Recent Labs   Lab 10/03/23  1001   WBC 6.26   RBC 3.40*   HGB 10.3*   HCT 30.5*   PLT 95*   MCV 90   MCH 30.3   MCHC 33.8          I have personally reviewed pertinent radiological imaging and reports.    Patient care was time spent personally by me on the following activities:   Obtaining a history  Examination of patient.  Providing medical care at the patients bedside.  Developing a treatment plan with patient or surrogate and bedside caregivers  Ordering and reviewing laboratory studies, radiographic studies, pulse oximetry.  Ordering and performing treatments and interventions.  Evaluation of patient's response to treatment.  Discussions with consultants while on the unit and immediately available to the patient.  Re-evaluation of the patient's condition.  Documentation in the medical record.       Hugh Figueroa MD  Nephrology  [facility]  [phone]

## 2023-10-03 NOTE — ED PROVIDER NOTES
Encounter Date: 10/3/2023       History     Chief Complaint   Patient presents with    Abdominal Pain     Since last night. 10/10. C/O vomiting as well.      33-year-old female with end-stage renal disease on dialysis and scheduled to have dialysis today presented emergency department with epigastric abdominal pain and nausea and vomiting.  Patient had similar presentations multiple times in the past and has abdominal pain intermittently.  Patient states this pain is similar to her previous episodes of abdominal pain.  Denies fever or chills or chest pain or shortness of breath or dysuria or hematuria or any focal weakness or numbness.      Review of patient's allergies indicates:   Allergen Reactions    Penicillins Hives     Past Medical History:   Diagnosis Date    ESRD on hemodialysis 2022    Gastritis 2022    EGD was 21    Gastroparesis 2022    has not had Emptying study    Heart failure with preserved ejection fraction 2022    EF 55% on 3/22    History of supraventricular tachycardia     Hyperkalemia 2022    Hypertensive emergency 2022    Sickle cell trait 2022    Type 2 diabetes mellitus      Past Surgical History:   Procedure Laterality Date     SECTION      x 3    COLONOSCOPY      COLONOSCOPY N/A 2022    Procedure: COLONOSCOPY;  Surgeon: Jagdeep Cedeno MD;  Location: [facility];  Service: Endoscopy;  Laterality: N/A;    ESOPHAGOGASTRODUODENOSCOPY N/A 10/18/2019    Procedure: ESOPHAGOGASTRODUODENOSCOPY (EGD);  Surgeon: Gianluca Mendez MD;  Location: [facility];  Service: Endoscopy;  Laterality: N/A;    ESOPHAGOGASTRODUODENOSCOPY N/A 2022    Procedure: EGD (ESOPHAGOGASTRODUODENOSCOPY);  Surgeon: Micky Paredes III, MD;  Location: [facility];  Service: Endoscopy;  Laterality: N/A;    ESOPHAGOGASTRODUODENOSCOPY N/A 2022    Procedure: EGD (ESOPHAGOGASTRODUODENOSCOPY);  Surgeon: Marcelo Zhong MD;  Location: [facility];  Service: Endoscopy;   Laterality: N/A;    INSERTION, CATHETER, TUNNELED N/A 6/17/2023    Procedure: Insertion,catheter,tunneled;  Surgeon: Carlos Thurman Jr., MD;  Location: [facility] OR;  Service: General;  Laterality: N/A;    LAPAROSCOPIC CHOLECYSTECTOMY N/A 07/30/2022    Procedure: CHOLECYSTECTOMY, LAPAROSCOPIC;  Surgeon: Grey Perez MD;  Location: [facility] OR;  Service: General;  Laterality: N/A;    PLACEMENT OF DUAL-LUMEN VASCULAR CATHETER Left 07/12/2022    Procedure: INSERTION-CATHETER-JOSEPH;  Surgeon: Dionte Gan MD;  Location: [facility] OR;  Service: General;  Laterality: Left;    PLACEMENT OF DUAL-LUMEN VASCULAR CATHETER Right 07/26/2022    Procedure: INSERTION-CATHETER-Hemosplit;  Surgeon: Dionte Gan MD;  Location: [facility] OR;  Service: General;  Laterality: Right;     Family History   Problem Relation Age of Onset    Diabetes Mother     Diabetes Father      Social History     Tobacco Use    Smoking status: Never    Smokeless tobacco: Never   Substance Use Topics    Alcohol use: Not Currently    Drug use: No     Review of Systems   Constitutional: Negative.    HENT: Negative.     Eyes: Negative.    Respiratory: Negative.     Cardiovascular: Negative.  Negative for chest pain.   Gastrointestinal:  Positive for abdominal pain, nausea and vomiting.   Endocrine: Negative.    Genitourinary: Negative.    Musculoskeletal: Negative.    Skin: Negative.    Allergic/Immunologic: Negative.    Neurological: Negative.    Hematological: Negative.    Psychiatric/Behavioral: Negative.     All other systems reviewed and are negative.      Physical Exam     Initial Vitals [10/03/23 0903]   BP Pulse Resp Temp SpO2   (!) 166/106 84 20 98.2 °F (36.8 °C) 95 %      MAP       --         Physical Exam    Nursing note and vitals reviewed.  Constitutional: She appears well-developed and well-nourished. She appears distressed.   Uncomfortable secondary to pain   HENT:   Head: Normocephalic and atraumatic.   Nose: Nose normal.   Mouth/Throat:  Oropharynx is clear and moist.   Eyes: Conjunctivae and EOM are normal. Pupils are equal, round, and reactive to light.   Neck: Neck supple. No thyromegaly present. No tracheal deviation present. No JVD present.   Normal range of motion.  Cardiovascular:  Normal rate, regular rhythm, normal heart sounds and intact distal pulses.           No murmur heard.  Pulmonary/Chest: Breath sounds normal. No stridor. No respiratory distress. She has no wheezes. She has no rales.   Abdominal: Abdomen is soft. Bowel sounds are normal. There is abdominal tenderness.   Upper abdominal tenderness in the epigastric region   Musculoskeletal:         General: No edema. Normal range of motion.      Cervical back: Normal range of motion and neck supple.     Neurological: She is alert and oriented to person, place, and time. She has normal strength. GCS score is 15. GCS eye subscore is 4. GCS verbal subscore is 5. GCS motor subscore is 6.   Skin: Skin is warm. Capillary refill takes less than 2 seconds.   Psychiatric: She has a normal mood and affect. Thought content normal.         ED Course   Critical Care    Date/Time: 10/3/2023 1:03 PM    Performed by: Vinod Esteban MD  Authorized by: Alexys Gonzalez MD  Direct patient critical care time: 20 minutes  Ordering / reviewing critical care time: 5 minutes  Documentation critical care time: 5 minutes  Total critical care time (exclusive of procedural time) : 30 minutes      Intubation    Date/Time: 10/3/2023 1:04 PM  Location procedure was performed: [facility] EMERGENCY DEPARTMENT    Performed by: Vinod Esteban MD  Authorized by: Alexys Gonzalez MD  Indications: airway protection  Intubation method: video-assisted  Patient status: paralyzed (RSI)  Preoxygenation: BVM  Sedatives: etomidate  Paralytic: rocuronium  Laryngoscope size: Glide 4  Tube size: 7.5 mm  Tube type: cuffed  Number of attempts: 1  Post-procedure assessment: chest rise and CO2 detector  Breath sounds: clear  Cuff  inflated: yes  ETT to lip: 23 cm  Tube secured with: ETT ortiz  Chest x-ray interpreted by me.  Complications: No        Labs Reviewed   CBC W/ AUTO DIFFERENTIAL - Abnormal; Notable for the following components:       Result Value    RBC 3.40 (*)     Hemoglobin 10.3 (*)     Hematocrit 30.5 (*)     RDW 16.7 (*)     Platelets 95 (*)     Lymph # 0.6 (*)     Gran % 79.5 (*)     Lymph % 10.1 (*)     All other components within normal limits   COMPREHENSIVE METABOLIC PANEL - Abnormal; Notable for the following components:    Potassium 5.7 (*)     Glucose 123 (*)     BUN 46 (*)     Creatinine 6.5 (*)     Calcium 8.5 (*)     Total Bilirubin 1.3 (*)     Alkaline Phosphatase 227 (*)      (*)      (*)     eGFR 8.0 (*)     All other components within normal limits   TROPONIN I HIGH SENSITIVITY - Abnormal; Notable for the following components:    Troponin I High Sensitivity 37.9 (*)     All other components within normal limits   B-TYPE NATRIURETIC PEPTIDE - Abnormal; Notable for the following components:    BNP 3,572 (*)     All other components within normal limits   CULTURE, RESPIRATORY   PROTIME-INR   MAGNESIUM   LIPASE   LACTIC ACID, PLASMA     EKG Readings: (Independently Interpreted)   Initial Reading: No STEMI. Rhythm: Normal Sinus Rhythm. Ectopy: No Ectopy. Conduction: Normal.     ECG Results              EKG 12-lead (In process)  Result time 10/03/23 09:22:11      In process by Interface, Lab In [facility] (10/03/23 09:22:11)                   Narrative:    Test Reason : R06.02,    Vent. Rate : 083 BPM     Atrial Rate : 083 BPM     P-R Int : 184 ms          QRS Dur : 084 ms      QT Int : 440 ms       P-R-T Axes : 028 -34 080 degrees     QTc Int : 517 ms    Normal sinus rhythm  Left axis deviation  Nonspecific ST abnormality  Prolonged QT  Abnormal ECG  When compared with ECG of 19-SEP-2023 13:17,  Nonspecific T wave abnormality no longer evident in Anterior leads    Referred By:  SELF           Confirmed  By:                                   Imaging Results              CT Head Without Contrast (In process)  Result time 10/03/23 13:03:14                     CTA Chest Non-Coronary (PE Studies) (In process)                      CT Abdomen Pelvis With Contrast (In process)                      X-Ray Chest AP Portable (Final result)  Result time 10/03/23 13:05:32      Final result by Deandre Shirley MD (10/03/23 13:05:32)                   Narrative:    CLINICAL HISTORY:  33 years (1990) Female code [id number]    TECHNIQUE:  Portable AP radiograph the chest. One view.    COMPARISON:  Radiograph from October 13, 2023.    FINDINGS:  Moderate diffuse interstitial opacity in both lungs with a faint airspace opacity in the retrocardiac left lower lobe, worse from the previous exam. There is blunting of the left costophrenic angle consistent with a trace pleural effusion. No pneumothorax is identified. Moderate cardiopericardial silhouette enlargement. Osseous structures appear unchanged. There are clips in the right upper abdominal quadrant consistent with prior biliary surgery.    Lines and tubes: Interval intubation with an endotracheal tube projecting 2 cm from the brea. Interval placement of an enteric tube with tip below the field of view (subdiaphragmatic). Right-sided IJ dual lumen split tip central venous catheter with distal tip at the level of the right atria.    IMPRESSION:  1. Interval intubation with an endotracheal tube projecting 2 cm from the brea.  2. Slight interval worsening mixed interstitial and alveolar opacities in both lungs suggesting moderate ovarian edema.  3. Interval placement of an territory tube with tip projecting below the field of view (likely within the stomach)                  .            Electronically signed by:  Deandre Shirley MD  10/03/2023 01:05 PM CDT Workstation: [id number]                                     US Abdomen Limited (Final result)  Result time 10/03/23  12:55:23      Final result by Rea Solano MD (10/03/23 12:55:23)                   Narrative:    Reason: pain    COMPARISON: CT dated 9/19/2023    FINDINGS: There is a small right pleural effusion.  The liver is enlarged measuring 19.2 cm. There is no focal mass or biliary duct dilatation. There is trace perihepatic fluid.  Gallbladder is absent. The common bile duct measures 3 mm.  The right kidney is normal in size without hydronephrosis.    IMPRESSION: Hepatomegaly with trace fluid around the tip of the right lobe of the liver    Small right pleural effusion    Prior cholecystectomy    Electronically signed by:  Rae Solano MD  10/03/2023 12:55 PM CDT Workstation: [id number]                                     X-Ray Chest AP Portable (Final result)  Result time 10/03/23 09:38:25      Final result by Von Bland MD (10/03/23 09:38:25)                   Narrative:    XR CHEST 1 VIEW    CLINICAL HISTORY:  33 years Female CHF    COMPARISON: September 19, 2023    FINDINGS: Cardiopericardial silhouette is enlarged. Right IJ dual-lumen central venous line is in place with distal tip overlying the cavoatrial junction. Mild increased interstitial markings bilaterally and fissural thickening suggestive of trace pleural fluid. Minimal blunting of left costophrenic angle. No pneumothorax. No acute osseous abnormality.    IMPRESSION:    Cardiomegaly with findings of mild interstitial edema/volume overload.    Electronically signed by:  Von Bland MD  10/03/2023 09:38 AM CDT Workstation: [id number]                                     Medications   aluminum-magnesium hydroxide-simethicone 200-200-20 mg/5 mL suspension 30 mL (0 mLs Oral Hold 10/3/23 1130)     And   LIDOcaine HCl 2% oral solution 15 mL (0 mLs Oral Hold 10/3/23 1130)   albuterol nebulizer solution 10 mg (has no administration in time range)   calcium gluconate 1 g in NS IVPB (premixed) (1 g Intravenous New Bag 10/3/23 1221)   sodium  chloride 0.9% flush 10 mL (has no administration in time range)   naloxone 0.4 mg/mL injection 0.02 mg (has no administration in time range)   glucose chewable tablet 16 g (has no administration in time range)   glucose chewable tablet 24 g (has no administration in time range)   dextrose 50% injection 12.5 g (has no administration in time range)   dextrose 50% injection 25 g (has no administration in time range)   glucagon (human recombinant) injection 1 mg (has no administration in time range)   prochlorperazine injection Soln 5 mg (has no administration in time range)   naloxone (NARCAN) 0.4 mg/mL injection (has no administration in time range)   EPINEPHrine (ADRENALIN) 5 mg in dextrose 5 % (D5W) 250 mL infusion (has no administration in time range)   propofol (DIPRIVAN) 10 mg/mL infusion (has no administration in time range)   iohexoL (OMNIPAQUE 350) injection 100 mL (has no administration in time range)   iodixanoL (VISIPAQUE 320) injection 100 mL (has no administration in time range)   levoFLOXacin 500 mg/100 mL IVPB 500 mg (has no administration in time range)   metronidazole IVPB 500 mg (has no administration in time range)   pantoprazole injection 40 mg (40 mg Intravenous Given 10/3/23 0958)   ondansetron injection 8 mg (8 mg Intravenous Given 10/3/23 0959)   HYDROmorphone injection 1 mg (1 mg Intravenous Given 10/3/23 1036)   pantoprazole injection 40 mg (40 mg Intravenous Given 10/3/23 1036)   HYDROmorphone injection 1 mg (1 mg Intravenous Given 10/3/23 1220)   sodium bicarbonate 8.4 % (1 mEq/mL) injection 50 mEq (50 mEq Intravenous Given 10/3/23 1221)     Medical Decision Making  33-year-old female with abdominal pain and nausea and vomiting.  Patient had similar abdominal pains in the past and in the epigastric region.  Patient had multiple CT scans for evaluation of similar pains.  Screening labs and workup reviewed and elevated liver enzymes noted.  Case discussed with Hospital Medicine.  Hyperkalemia  noted as well.  Patient would need dialysis.  Patient's pain improved with treatment however still symptomatic so Hospital Medicine consulted for evaluation for admission for further management of pain and also will add gallbladder ultrasound for further evaluation of possible gallstones and elevated liver enzymes.  Nephrology consulted for dialysis.  Hospital Medicine to evaluate patient for further management and treatment of abdominal pain and hyperkalemia and elevated liver enzymes.    Amount and/or Complexity of Data Reviewed  Labs: ordered. Decision-making details documented in ED Course.  Radiology: ordered. Decision-making details documented in ED Course.  ECG/medicine tests: ordered. Decision-making details documented in ED Course.    Risk  OTC drugs.  Prescription drug management.  Risk Details: Patient presented emergency department and continued to have abdominal pain and given patient's distress and continued abdominal pain and patient noted to be hyperkalemic.  Patient's hyperkalemia treated and repeat dose of Dilaudid given given patient's intractable pain and patient while getting ultrasound started becoming sleepy and Hospital Medicine physician walk done and was trying to get history and patient became more and more sleepy and became unresponsive and hospitalist believed he lost the pulse so started doing CPR and started using bag-valve-mask and ventilation and after patient received epinephrine and Narcan I checked the pulse and patient did have a pulse and patient at that point noted to be hypertensive.  Patient still unresponsive so etomidate and rocuronium used and patient intubated with 7.5 ET tube using GlideScope.  Patient started on sedation at this point and will closely monitor patient.  Nephrologist agreed to dialyze patient..  I was called to the room as Hospital Medicine was doing CPR and CPR was in progress and I continued care and able to resuscitate patient and there was return of  spontaneous circulation.  Patient's blood pressure initially was low and pulse likely was very weak.  Will do CT of the head and chest and abdomen and patient would get dialysis immediately after that.  During CPR hyperkalemia was treated again with calcium given patient's presentation.  Patient was hypertensive after resuscitation and will start sedation and Hospital Medicine put orders in for CT scans.  Initially CT scan not done an ultrasound done as patient had multiple CTs for evaluation of abdominal pain recently.  Given this change in events will start on broad-spectrum antibiotics while we wait for further evaluation and treatment                               Clinical Impression:   Final diagnoses:  [R10.13] Epigastric pain (Primary)  [E87.5] Hyperkalemia  [N18.6] End stage renal disease  [R09.2] Respiratory arrest        ED Disposition Condition    Observation                 Vinod Esteban MD  10/03/23 1222       Vinod Esteban MD  10/03/23 1315

## 2023-10-03 NOTE — CONSULTS
GASTROENTEROLOGY INPATIENT CONSULT NOTE  Patient Name: Tabby Howard  Patient MRN: [id number]  Patient : 1990    Admit Date: 10/3/2023  Service date: 10/3/2023    Reason for Consult: epig pain    PCP: Melony Kim NP    Chief Complaint   Patient presents with    Abdominal Pain     Since last night. 10/10. C/O vomiting as well.        HPI: Patient is a 33 y.o. female with PMHx lap freedom , HTN, ESRD on HD, DM, transfusion dependent chronic anemia, CHF, DVT (Eliquis), sickle cell trait, reported gastroparesis, small HH presents for evalaution of abdominal pain and n/v. Acute / chronic, intermittent, progressive on admission. During process of w/u, patient had code called and was intubated, resuscitated, intubated and brought to ICU w/ suspicions that this is complication of missed dialysis and hyperkalemia.       CHART REVIEW:   RUQ u/s 10/'23 - 3mm CBD s/p freedom; mild hepatomegaly;   CT Abd 10/'23 - small pleural/pericardial effusion; hepatic congestion?; s/p freedom. Nml biliary / panc  EGD  - smallHH; HP neg gastritis  MRCP  - poor study due to movment; GB sludge; CBD poorly seen  EGD  - gastritis.     Past Medical History:  Past Medical History:   Diagnosis Date    ESRD on hemodialysis 2022    Gastritis 2022    EGD was 22    Gastroparesis 2022    has not had Emptying study    Heart failure with preserved ejection fraction 2022    EF 55% on 3/22    History of supraventricular tachycardia     Hyperkalemia 2022    Hypertensive emergency 2022    Sickle cell trait 2022    Type 2 diabetes mellitus         Past Surgical History:  Past Surgical History:   Procedure Laterality Date     SECTION      x 3    COLONOSCOPY      COLONOSCOPY N/A 2022    Procedure: COLONOSCOPY;  Surgeon: Jagdeep Cedeno MD;  Location: [facility];  Service: Endoscopy;  Laterality: N/A;    ESOPHAGOGASTRODUODENOSCOPY N/A 10/18/2019    Procedure:  ESOPHAGOGASTRODUODENOSCOPY (EGD);  Surgeon: Gianluca Mendez MD;  Location: [facility] ENDO;  Service: Endoscopy;  Laterality: N/A;    ESOPHAGOGASTRODUODENOSCOPY N/A 08/24/2022    Procedure: EGD (ESOPHAGOGASTRODUODENOSCOPY);  Surgeon: Micky Paredes III, MD;  Location: [facility] ENDO;  Service: Endoscopy;  Laterality: N/A;    ESOPHAGOGASTRODUODENOSCOPY N/A 12/5/2022    Procedure: EGD (ESOPHAGOGASTRODUODENOSCOPY);  Surgeon: Marcelo Zhong MD;  Location: [facility] ENDO;  Service: Endoscopy;  Laterality: N/A;    INSERTION, CATHETER, TUNNELED N/A 6/17/2023    Procedure: Insertion,catheter,tunneled;  Surgeon: Carlos Thurman Jr., MD;  Location: Health system OR;  Service: General;  Laterality: N/A;    LAPAROSCOPIC CHOLECYSTECTOMY N/A 07/30/2022    Procedure: CHOLECYSTECTOMY, LAPAROSCOPIC;  Surgeon: Grey Perez MD;  Location: Health system OR;  Service: General;  Laterality: N/A;    PLACEMENT OF DUAL-LUMEN VASCULAR CATHETER Left 07/12/2022    Procedure: INSERTION-CATHETER-JOSEPH;  Surgeon: Dionte Gan MD;  Location: Health system OR;  Service: General;  Laterality: Left;    PLACEMENT OF DUAL-LUMEN VASCULAR CATHETER Right 07/26/2022    Procedure: INSERTION-CATHETER-Hemosplit;  Surgeon: Dionte Gan MD;  Location: Health system OR;  Service: General;  Laterality: Right;        Home Medications:  Medications Prior to Admission   Medication Sig Dispense Refill Last Dose    amLODIPine (NORVASC) 5 MG tablet Take 1 tablet (5 mg total) by mouth once daily. 30 tablet 1 Unknown    apixaban (ELIQUIS) 5 mg Tab Take 1 tablet (5 mg total) by mouth 2 (two) times daily. 60 tablet 2 Unknown    carvediloL (COREG) 25 MG tablet Take 1 tablet (25 mg total) by mouth 2 (two) times daily. 60 tablet 5 Unknown    cetirizine (ZYRTEC) 10 MG tablet Take 1 tablet every day by oral route. (Patient taking differently: Take 10 mg by mouth once daily.) 30 tablet 0 Unknown    FLUoxetine 20 MG capsule Take 1 capsule (20 mg total) by mouth once daily. 30 capsule 5 Unknown     "fluticasone propionate (FLONASE) 50 mcg/actuation nasal spray [place] 1 spray every day by intranasal route. (Patient taking differently: 1 spray by Each Nostril route Daily.) 16 g 0 Unknown    gabapentin (NEURONTIN) 300 MG capsule Take 2 capsules twice a day by oral route for 90 days. (Patient taking differently: Take 600 mg by mouth 2 (two) times daily.) 360 capsule 1 Unknown    hydrALAZINE (APRESOLINE) 100 MG tablet Take 1 tablet (100 mg total) by mouth 2 (two) times a day. 60 tablet 2 Unknown    insulin aspart U-100 (NOVOLOG FLEXPEN U-100 INSULIN) 100 unit/mL (3 mL) InPn pen Inject 8 units 3 times a day by subcutaneous route before meals (Patient taking differently: Inject 8 Units into the skin 3 (three) times daily before meals.) 24 mL 1 Unknown    insulin detemir U-100, Levemir, (LEVEMIR FLEXTOUCH U100 INSULIN) 100 unit/mL (3 mL) InPn pen Inject 18 units every day by subcutaneous route in the evening 18 mL 1     insulin detemir U-100, Levemir, (LEVEMIR FLEXTOUCH U100 INSULIN) 100 unit/mL (3 mL) InPn pen Inject 21 units every day by subcutaneous route in the evening for 90 days. (Patient taking differently: Inject 21 Units into the skin every evening.) 15 mL 1 Unknown at 21    isosorbide mononitrate (IMDUR) 30 MG 24 hr tablet Take 1 tablet (30 mg total) by mouth once daily. 30 tablet 1 Unknown    lancets 33 gauge Misc Use to test twice daily 100 each 5     levETIRAcetam (KEPPRA) 500 MG Tab Take 1 tablet twice a day by oral route for 30 days. (Patient taking differently: Take 500 mg by mouth 2 (two) times daily.) 60 tablet 2     ondansetron (ZOFRAN-ODT) 4 MG TbDL Place 1 tablet (4 mg total) under the tongue every 8 (eight) hours. 24 tablet 2 Unknown    pantoprazole (PROTONIX) 40 MG tablet Take 1 tablet (40 mg total) by mouth once daily.   Unknown    pen needle, diabetic 31 gauge x 3/16" Ndle Use as directed to inject insulin 5 times daily 100 each 11     promethazine (PHENERGAN) 25 MG suppository Place 1 " suppository (25 mg total) rectally every 6 (six) hours as needed for Nausea. 10 suppository 0 Unknown    sucroferric oxyhydroxide (VELPHORO) 500 mg Chew Take 1 tablet 3 times a day (Patient taking differently: Take 1 tablet by mouth 3 (three) times daily.) 90 tablet 6 Unknown       Inpatient Medications:   sodium chloride 0.9%   Intravenous Once    cefTRIAXone (ROCEPHIN) IVPB  1 g Intravenous Q24H    heparin (porcine)  5,000 Units Subcutaneous Q8H    insulin aspart U-100  0-10 Units Subcutaneous Q4H    metronidazole  500 mg Intravenous ED 1 Time    mupirocin   Nasal BID     sodium chloride 0.9%, dextrose 50%, dextrose 50%, glucagon (human recombinant), glucagon (human recombinant), glucose, glucose, iohexoL, naloxone, prochlorperazine, sodium chloride 0.9%, sodium chloride 0.9%    Review of patient's allergies indicates:   Allergen Reactions    Penicillins Hives       Social History:   Social History     Occupational History    Not on file   Tobacco Use    Smoking status: Never    Smokeless tobacco: Never   Substance and Sexual Activity    Alcohol use: Not Currently    Drug use: No    Sexual activity: Not Currently     Partners: Male     Birth control/protection: I.U.D.       Family History:   Family History   Problem Relation Age of Onset    Diabetes Mother     Diabetes Father        Review of Systems:  A 10 point review of systems was performed and was normal, except as mentioned in the HPI, including constitutional, HEENT, heme, lymph, cardiovascular, respiratory, gastrointestinal, genitourinary, neurologic, endocrine, psychiatric and musculoskeletal.      OBJECTIVE:    Physical Exam:  24 Hour Vital Sign Ranges: Temp:  [94.1 °F (34.5 °C)-98.2 °F (36.8 °C)] 94.1 °F (34.5 °C)  Pulse:  [77-85] 80  Resp:  [6-24] 20  SpO2:  [95 %-100 %] 100 %  BP: (152-190)/() 183/102  Most recent vitals: BP (!) 183/102   Pulse 80   Temp (!) 94.1 °F (34.5 °C) (Core Esophageal) Comment (Src): Warming via HD machine  Resp 20    "Ht 5' 2" (1.575 m)   Wt 60.3 kg (132 lb 15 oz)   SpO2 100%   Breastfeeding No   BMI 24.31 kg/m²    GEN: acute / chronic ill appearing on vent  HEENT: ETT in place, sclera anicteric, oral mucosa pink and moist without lesion  NECK: trachea midline; Good ROM  CV: regular rate and rhythm, no murmurs or gallops  RESP: muffled on vent  ABD: soft, non-distended, hypoactive bowel sounds  EXT: no swelling or edema, 1+ pulses distally  SKIN: no rashes or jaundice  PSYCH: n/a    Labs:   Recent Labs     10/03/23  1001 10/03/23  1521   WBC 6.26 4.17   MCV 90 88   PLT 95* 70*     Recent Labs     10/03/23  1001      K 5.7*   CL 99   CO2 26   BUN 46*   *     No results for input(s): "ALB" in the last 72 hours.    Invalid input(s): "ALKP", "SGOT", "SGPT", "TBIL", "DBIL", "TPRO"  Recent Labs     10/03/23  1001   INR 0.9         Radiology Review:  CT Abdomen Pelvis With Contrast   Final Result      CTA Chest Non-Coronary (PE Studies)   Final Result      CT Head Without Contrast   Final Result      X-Ray Chest AP Portable   Final Result      US Abdomen Limited   Final Result      X-Ray Chest AP Portable   Final Result      X-Ray Chest 1 View    (Results Pending)         IMPRESSION / RECOMMENDATIONS:  33 y.o. female with PMHx lap freedom 7/'22, HTN, ESRD on HD, DM, transfusion dependent chronic anemia, CHF, DVT (Eliquis), sickle cell trait, reported gastroparesis, small HH presents for evalaution of abdominal pain and n/v. Patient had code called and was intubated, resuscitated, intubated and brought to ICU w/ suspicions that this is complication of missed dialysis and hyperkalemia.  RUQ u/s w/ non-dilated biliary system of 3mm making biliary source unlikely at this point.     -Conservative for now given ongoing issues and low suspicion on any biliary process. Patient poor candidate and high risk for any procedures   -Daily CMP but may expect further elevation sumanth if ischemic injury from cardiac arrest    Thank you for " this consult.    Micky HICKS Dauterive III  10/3/2023  4:10 PM

## 2023-10-03 NOTE — CARE UPDATE
10/03/23 1307   PRE-TX-O2   Device (Oxygen Therapy) ventilator  (transport ventilator)   $ Is the patient on Low Flow Oxygen? Yes   Oxygen Concentration (%) 100   SpO2 100 %   Pulse Oximetry Type Continuous   $ Pulse Oximetry - Single Charge Pulse Oximetry - Single        Airway - Non-Surgical 10/03/23 1255   Placement Date/Time: 10/03/23 1255   Method of Intubation: Glidescope  Inserted by: MD  Airway Device Size: 7.5  Style: Cuffed  Cuff Inflation: Minimal occlusive pressure  Cuff Inflated With: Air  Placement Verified By: Auscultation;Colorimetric EtCO2...   Secured at 24 cm   Measured At Lips   Secured Location Center   Secured by Commercial tube ortiz   Position of ETT in xRay In good position   Bite Block center;secure and patent   Site Condition Cool;Dry   Status Intact;Secured   Site Assessment Clean;Dry   Cuff Volume   (MLT)   Airway Safety   Is Ambu Bag and Mask with Patient? Yes, Adult Ambu Bag and Mask   Trach Supplies at Bedside Suction Catheter;10cc Syringes   Suction set is at the bedside? Yes   Vent Select   Conventional Vent Y vent set up in room awaiting pt    Intubation? Initial intubation   Does the patient have an artificial airway? Yes   Ventilator Initiated Yes   $ Ventilator Initial 1   Charged w/in last 24h YES   Preset Conventional Ventilator Settings   Vent ID [id number]   Vent Type    Ventilation Type VC   Vent Mode A/C   Humidity HME   Set Rate 16 BPM   Vt Set 350 mL   PEEP/CPAP 5 cmH20   Pressure Support 0 cmH20   Waveform RAMP   Peak Flow 65 L/min   Plateau Set/Insp. Hold (sec) 0   Trigger Sensitivity Flow/I-Trigger 3 L/min   Patient Ventilator Parameters   Resp Rate Total 0 br/min   Peak Airway Pressure 0 cmH20   Mean Airway Pressure 0 cmH20   Plateau Pressure 0 cmH20   Exhaled Vt 0 mL   Total Ve 0 L/m   I:E Ratio Measured 1.00:1   Conventional Ventilator Alarms   Resp Rate High Alarm 56 br/min   Press High Alarm 46 cmH2O   Apnea Rate 10   Apnea Volume (mL) 0 mL   Apnea Oxygen  Concentration  100   Apnea Flow Rate (L/min) 57   T Apnea 20 sec(s)   Ready to Wean/Extubation Screen   FIO2<=50 (chart decimal) (!) 1   MV<16L (chart vol.) 0   PEEP <=8 (chart #) 5   Education   $ Education 15 min;Ventilator Oxygen   Transport Patient   $ Transport Tech Time Charge 15 min   Time to transport 15   Oxygen Method Transport Vent   Transport to CT   Toleration Good   ETT Secure Yes   Ambu and mask at bedside Yes

## 2023-10-04 LAB
ALBUMIN SERPL BCP-MCNC: 3.7 G/DL (ref 3.5–5.2)
ALLENS TEST: ABNORMAL
ALP SERPL-CCNC: 213 U/L (ref 55–135)
ALT SERPL W/O P-5'-P-CCNC: 166 U/L (ref 10–44)
ANION GAP SERPL CALC-SCNC: 10 MMOL/L (ref 8–16)
AST SERPL-CCNC: 123 U/L (ref 10–40)
BASOPHILS # BLD AUTO: 0.03 K/UL (ref 0–0.2)
BASOPHILS NFR BLD: 0.5 % (ref 0–1.9)
BILIRUB SERPL-MCNC: 1.2 MG/DL (ref 0.1–1)
BUN SERPL-MCNC: 23 MG/DL (ref 6–20)
CALCIUM SERPL-MCNC: 9.2 MG/DL (ref 8.7–10.5)
CHLORIDE SERPL-SCNC: 104 MMOL/L (ref 95–110)
CO2 SERPL-SCNC: 23 MMOL/L (ref 23–29)
CREAT SERPL-MCNC: 3.8 MG/DL (ref 0.5–1.4)
DELSYS: ABNORMAL
DIFFERENTIAL METHOD: ABNORMAL
EOSINOPHIL # BLD AUTO: 0.1 K/UL (ref 0–0.5)
EOSINOPHIL NFR BLD: 2.1 % (ref 0–8)
ERYTHROCYTE [DISTWIDTH] IN BLOOD BY AUTOMATED COUNT: 16.7 % (ref 11.5–14.5)
ERYTHROCYTE [SEDIMENTATION RATE] IN BLOOD BY WESTERGREN METHOD: 16 MM/H
EST. GFR  (NO RACE VARIABLE): 15.3 ML/MIN/1.73 M^2
FIO2: 26
GLUCOSE SERPL-MCNC: 101 MG/DL (ref 70–110)
GLUCOSE SERPL-MCNC: 115 MG/DL (ref 70–110)
GLUCOSE SERPL-MCNC: 119 MG/DL (ref 70–110)
GLUCOSE SERPL-MCNC: 133 MG/DL (ref 70–110)
GLUCOSE SERPL-MCNC: 152 MG/DL (ref 70–110)
GLUCOSE SERPL-MCNC: 156 MG/DL (ref 70–110)
GLUCOSE SERPL-MCNC: 170 MG/DL (ref 70–110)
GLUCOSE SERPL-MCNC: 261 MG/DL (ref 70–110)
GLUCOSE SERPL-MCNC: 284 MG/DL (ref 70–110)
HCO3 UR-SCNC: 21.7 MMOL/L (ref 24–28)
HCT VFR BLD AUTO: 33.7 % (ref 37–48.5)
HCT VFR BLD CALC: 32 %PCV (ref 36–54)
HGB BLD-MCNC: 11 G/DL (ref 12–16)
IMM GRANULOCYTES # BLD AUTO: 0.02 K/UL (ref 0–0.04)
IMM GRANULOCYTES NFR BLD AUTO: 0.3 % (ref 0–0.5)
LYMPHOCYTES # BLD AUTO: 0.6 K/UL (ref 1–4.8)
LYMPHOCYTES NFR BLD: 10.2 % (ref 18–48)
MAGNESIUM SERPL-MCNC: 2.1 MG/DL (ref 1.6–2.6)
MCH RBC QN AUTO: 29.7 PG (ref 27–31)
MCHC RBC AUTO-ENTMCNC: 32.6 G/DL (ref 32–36)
MCV RBC AUTO: 91 FL (ref 82–98)
MIN VOL: 7
MODE: ABNORMAL
MONOCYTES # BLD AUTO: 0.5 K/UL (ref 0.3–1)
MONOCYTES NFR BLD: 7.5 % (ref 4–15)
NEUTROPHILS # BLD AUTO: 4.8 K/UL (ref 1.8–7.7)
NEUTROPHILS NFR BLD: 79.4 % (ref 38–73)
NRBC BLD-RTO: 0 /100 WBC
PCO2 BLDA: 43.6 MMHG (ref 35–45)
PEEP: 5
PH SMN: 7.3 [PH] (ref 7.35–7.45)
PIP: 19
PLATELET # BLD AUTO: 98 K/UL (ref 150–450)
PMV BLD AUTO: 10.7 FL (ref 9.2–12.9)
PO2 BLDA: 92 MMHG (ref 80–100)
POC BE: -5 MMOL/L
POC IONIZED CALCIUM: 1.21 MMOL/L (ref 1.06–1.42)
POC SATURATED O2: 96 % (ref 95–100)
POC TCO2: 23 MMOL/L (ref 23–27)
POTASSIUM BLD-SCNC: 5.2 MMOL/L (ref 3.5–5.1)
POTASSIUM SERPL-SCNC: 5.2 MMOL/L (ref 3.5–5.1)
PROT SERPL-MCNC: 6.8 G/DL (ref 6–8.4)
RBC # BLD AUTO: 3.7 M/UL (ref 4–5.4)
SAMPLE: ABNORMAL
SITE: ABNORMAL
SODIUM BLD-SCNC: 136 MMOL/L (ref 136–145)
SODIUM SERPL-SCNC: 137 MMOL/L (ref 136–145)
SP02: 100
VT: 350
WBC # BLD AUTO: 6.1 K/UL (ref 3.9–12.7)

## 2023-10-04 PROCEDURE — 63600175 PHARM REV CODE 636 W HCPCS: Performed by: INTERNAL MEDICINE

## 2023-10-04 PROCEDURE — 63600175 PHARM REV CODE 636 W HCPCS: Performed by: STUDENT IN AN ORGANIZED HEALTH CARE EDUCATION/TRAINING PROGRAM

## 2023-10-04 PROCEDURE — 99291 PR CRITICAL CARE, E/M 30-74 MINUTES: ICD-10-PCS | Mod: ,,, | Performed by: STUDENT IN AN ORGANIZED HEALTH CARE EDUCATION/TRAINING PROGRAM

## 2023-10-04 PROCEDURE — 25000242 PHARM REV CODE 250 ALT 637 W/ HCPCS: Performed by: STUDENT IN AN ORGANIZED HEALTH CARE EDUCATION/TRAINING PROGRAM

## 2023-10-04 PROCEDURE — 85025 COMPLETE CBC W/AUTO DIFF WBC: CPT | Performed by: INTERNAL MEDICINE

## 2023-10-04 PROCEDURE — 84295 ASSAY OF SERUM SODIUM: CPT

## 2023-10-04 PROCEDURE — 82330 ASSAY OF CALCIUM: CPT

## 2023-10-04 PROCEDURE — 83735 ASSAY OF MAGNESIUM: CPT | Performed by: INTERNAL MEDICINE

## 2023-10-04 PROCEDURE — 96372 THER/PROPH/DIAG INJ SC/IM: CPT | Performed by: INTERNAL MEDICINE

## 2023-10-04 PROCEDURE — 99900035 HC TECH TIME PER 15 MIN (STAT)

## 2023-10-04 PROCEDURE — 84132 ASSAY OF SERUM POTASSIUM: CPT

## 2023-10-04 PROCEDURE — 96372 THER/PROPH/DIAG INJ SC/IM: CPT | Performed by: STUDENT IN AN ORGANIZED HEALTH CARE EDUCATION/TRAINING PROGRAM

## 2023-10-04 PROCEDURE — 99900026 HC AIRWAY MAINTENANCE (STAT)

## 2023-10-04 PROCEDURE — 25000003 PHARM REV CODE 250: Performed by: STUDENT IN AN ORGANIZED HEALTH CARE EDUCATION/TRAINING PROGRAM

## 2023-10-04 PROCEDURE — 25000003 PHARM REV CODE 250: Performed by: INTERNAL MEDICINE

## 2023-10-04 PROCEDURE — 85014 HEMATOCRIT: CPT

## 2023-10-04 PROCEDURE — 12000002 HC ACUTE/MED SURGE SEMI-PRIVATE ROOM

## 2023-10-04 PROCEDURE — 99900031 HC PATIENT EDUCATION (STAT)

## 2023-10-04 PROCEDURE — 82803 BLOOD GASES ANY COMBINATION: CPT

## 2023-10-04 PROCEDURE — 36415 COLL VENOUS BLD VENIPUNCTURE: CPT | Performed by: INTERNAL MEDICINE

## 2023-10-04 PROCEDURE — 36600 WITHDRAWAL OF ARTERIAL BLOOD: CPT

## 2023-10-04 PROCEDURE — 99291 CRITICAL CARE FIRST HOUR: CPT | Mod: ,,, | Performed by: STUDENT IN AN ORGANIZED HEALTH CARE EDUCATION/TRAINING PROGRAM

## 2023-10-04 PROCEDURE — 80053 COMPREHEN METABOLIC PANEL: CPT | Performed by: INTERNAL MEDICINE

## 2023-10-04 PROCEDURE — 27000221 HC OXYGEN, UP TO 24 HOURS

## 2023-10-04 PROCEDURE — 94761 N-INVAS EAR/PLS OXIMETRY MLT: CPT

## 2023-10-04 RX ORDER — ISOSORBIDE MONONITRATE 30 MG/1
30 TABLET, EXTENDED RELEASE ORAL DAILY
Status: DISCONTINUED | OUTPATIENT
Start: 2023-10-05 | End: 2023-10-05 | Stop reason: HOSPADM

## 2023-10-04 RX ORDER — FLUOXETINE HYDROCHLORIDE 20 MG/1
20 CAPSULE ORAL DAILY
Status: DISCONTINUED | OUTPATIENT
Start: 2023-10-05 | End: 2023-10-05 | Stop reason: HOSPADM

## 2023-10-04 RX ORDER — HYDRALAZINE HYDROCHLORIDE 25 MG/1
100 TABLET, FILM COATED ORAL 2 TIMES DAILY
Status: DISCONTINUED | OUTPATIENT
Start: 2023-10-04 | End: 2023-10-05 | Stop reason: HOSPADM

## 2023-10-04 RX ORDER — LEVETIRACETAM 500 MG/1
500 TABLET ORAL 2 TIMES DAILY
Status: DISCONTINUED | OUTPATIENT
Start: 2023-10-04 | End: 2023-10-05 | Stop reason: HOSPADM

## 2023-10-04 RX ORDER — ACETAMINOPHEN 325 MG/1
650 TABLET ORAL EVERY 6 HOURS PRN
Status: DISCONTINUED | OUTPATIENT
Start: 2023-10-04 | End: 2023-10-05 | Stop reason: HOSPADM

## 2023-10-04 RX ORDER — CARVEDILOL 25 MG/1
25 TABLET ORAL 2 TIMES DAILY
Status: DISCONTINUED | OUTPATIENT
Start: 2023-10-04 | End: 2023-10-05 | Stop reason: HOSPADM

## 2023-10-04 RX ORDER — INSULIN ASPART 100 [IU]/ML
8 INJECTION, SOLUTION INTRAVENOUS; SUBCUTANEOUS
Status: DISCONTINUED | OUTPATIENT
Start: 2023-10-04 | End: 2023-10-04

## 2023-10-04 RX ORDER — FLUTICASONE PROPIONATE 50 MCG
1 SPRAY, SUSPENSION (ML) NASAL DAILY
Status: DISCONTINUED | OUTPATIENT
Start: 2023-10-04 | End: 2023-10-05 | Stop reason: HOSPADM

## 2023-10-04 RX ORDER — AMLODIPINE BESYLATE 5 MG/1
5 TABLET ORAL DAILY
Status: DISCONTINUED | OUTPATIENT
Start: 2023-10-05 | End: 2023-10-05 | Stop reason: HOSPADM

## 2023-10-04 RX ORDER — CETIRIZINE HYDROCHLORIDE 5 MG/1
10 TABLET ORAL DAILY
Status: DISCONTINUED | OUTPATIENT
Start: 2023-10-05 | End: 2023-10-05 | Stop reason: HOSPADM

## 2023-10-04 RX ORDER — MORPHINE SULFATE 2 MG/ML
1 INJECTION, SOLUTION INTRAMUSCULAR; INTRAVENOUS EVERY 6 HOURS PRN
Status: DISCONTINUED | OUTPATIENT
Start: 2023-10-04 | End: 2023-10-04

## 2023-10-04 RX ORDER — ONDANSETRON 4 MG/1
4 TABLET, ORALLY DISINTEGRATING ORAL EVERY 8 HOURS
Status: DISCONTINUED | OUTPATIENT
Start: 2023-10-04 | End: 2023-10-05 | Stop reason: HOSPADM

## 2023-10-04 RX ORDER — PANTOPRAZOLE SODIUM 40 MG/1
40 TABLET, DELAYED RELEASE ORAL DAILY
Status: DISCONTINUED | OUTPATIENT
Start: 2023-10-04 | End: 2023-10-05 | Stop reason: HOSPADM

## 2023-10-04 RX ADMIN — MUPIROCIN 1 G: 20 OINTMENT TOPICAL at 09:10

## 2023-10-04 RX ADMIN — HEPARIN SODIUM 5000 UNITS: 5000 INJECTION INTRAVENOUS; SUBCUTANEOUS at 06:10

## 2023-10-04 RX ADMIN — ONDANSETRON 4 MG: 4 TABLET, ORALLY DISINTEGRATING ORAL at 09:10

## 2023-10-04 RX ADMIN — LEVETIRACETAM 500 MG: 500 TABLET, FILM COATED ORAL at 09:10

## 2023-10-04 RX ADMIN — HEPARIN SODIUM 5000 UNITS: 5000 INJECTION INTRAVENOUS; SUBCUTANEOUS at 03:10

## 2023-10-04 RX ADMIN — PROPOFOL 50 MCG/KG/MIN: 10 INJECTION, EMULSION INTRAVENOUS at 08:10

## 2023-10-04 RX ADMIN — ACETAMINOPHEN 650 MG: 325 TABLET ORAL at 04:10

## 2023-10-04 RX ADMIN — INSULIN ASPART 3 UNITS: 100 INJECTION, SOLUTION INTRAVENOUS; SUBCUTANEOUS at 09:10

## 2023-10-04 RX ADMIN — INSULIN ASPART 2 UNITS: 100 INJECTION, SOLUTION INTRAVENOUS; SUBCUTANEOUS at 06:10

## 2023-10-04 RX ADMIN — ACETAMINOPHEN 650 MG: 325 TABLET ORAL at 10:10

## 2023-10-04 RX ADMIN — PROPOFOL 50 MCG/KG/MIN: 10 INJECTION, EMULSION INTRAVENOUS at 03:10

## 2023-10-04 RX ADMIN — PANTOPRAZOLE SODIUM 40 MG: 40 TABLET, DELAYED RELEASE ORAL at 04:10

## 2023-10-04 RX ADMIN — CARVEDILOL 25 MG: 25 TABLET, FILM COATED ORAL at 09:10

## 2023-10-04 RX ADMIN — INSULIN ASPART 2 UNITS: 100 INJECTION, SOLUTION INTRAVENOUS; SUBCUTANEOUS at 05:10

## 2023-10-04 RX ADMIN — HYDRALAZINE HYDROCHLORIDE 100 MG: 25 TABLET ORAL at 09:10

## 2023-10-04 RX ADMIN — HYDRALAZINE HYDROCHLORIDE 10 MG: 20 INJECTION INTRAMUSCULAR; INTRAVENOUS at 03:10

## 2023-10-04 RX ADMIN — DEXTROSE MONOHYDRATE 1 G: 5 INJECTION, SOLUTION INTRAVENOUS at 03:10

## 2023-10-04 RX ADMIN — FLUTICASONE PROPIONATE 50 MCG: 50 SPRAY, METERED NASAL at 05:10

## 2023-10-04 RX ADMIN — APIXABAN 5 MG: 5 TABLET, FILM COATED ORAL at 09:10

## 2023-10-04 RX ADMIN — MUPIROCIN 1 G: 20 OINTMENT TOPICAL at 08:10

## 2023-10-04 RX ADMIN — MORPHINE SULFATE 1 MG: 2 INJECTION, SOLUTION INTRAMUSCULAR; INTRAVENOUS at 11:10

## 2023-10-04 NOTE — ASSESSMENT & PLAN NOTE
Likely from opioid use (UDS is negative) possible other opioids such as fentanyl is possibility (pt was becoming bradypneac before coding)  Plus with hyperkalemia from missed HD-   Get ECHO  EKG shows prolonged QTc-       Extubated - stable for downgrade to med/tele

## 2023-10-04 NOTE — PLAN OF CARE
Problem: Adult Inpatient Plan of Care  Goal: Patient-Specific Goal (Individualized)  Outcome: Ongoing, Progressing  Goal: Absence of Hospital-Acquired Illness or Injury  Outcome: Ongoing, Progressing     Problem: Infection (Hemodialysis)  Goal: Absence of Infection Signs and Symptoms  Outcome: Ongoing, Progressing     Problem: Diabetes Comorbidity  Goal: Blood Glucose Level Within Targeted Range  Outcome: Ongoing, Progressing     Problem: Inability to Wean (Mechanical Ventilation, Invasive)  Goal: Mechanical Ventilation Liberation  Outcome: Ongoing, Progressing      Maintained target vital signs. Still on AC mode at FIO2  26%. Still on profopol, easily awaken when stimulated. Safety maintained.

## 2023-10-04 NOTE — PROGRESS NOTES
[facility] Medicine  Progress Note    Patient Name: Tabby Howard  MRN: [id number]  Patient Class: IP- Inpatient   Admission Date: 10/3/2023  Length of Stay: 0 days  Attending Physician: David Awan MD  Primary Care Provider: Melony Kim NP        Subjective:     Principal Problem:Cardiac arrest        HPI:  No notes on file    Overview/Hospital Course:  No notes on file    Interval History: Pt was seen extubated- was complaining of severe abdominal pain- but no chest pain, sob, fever, chills, n/v, urine or bowel problem.     Review of Systems   Constitutional:  Negative for activity change, appetite change, chills and fever.   HENT:  Negative for congestion, ear pain and nosebleeds.    Eyes:  Negative for itching and visual disturbance.   Respiratory:  Negative for apnea, cough, chest tightness, shortness of breath and wheezing.    Cardiovascular:  Negative for chest pain, palpitations and leg swelling.   Gastrointestinal:  Positive for abdominal pain. Negative for abdominal distention, constipation, diarrhea, nausea and vomiting.   Genitourinary:  Negative for dysuria and flank pain.   Musculoskeletal:  Negative for back pain.   Neurological:  Negative for dizziness and headaches.     Objective:     Vital Signs (Most Recent):  Temp: 98.5 °F (36.9 °C) (10/04/23 1501)  Pulse: 85 (10/04/23 1501)  Resp: 20 (10/04/23 1501)  BP: (!) 163/105 (10/04/23 1501)  SpO2: 100 % (10/04/23 1501) Vital Signs (24h Range):  Temp:  [96.1 °F (35.6 °C)-98.7 °F (37.1 °C)] 98.5 °F (36.9 °C)  Pulse:  [71-86] 85  Resp:  [13-27] 20  SpO2:  [90 %-100 %] 100 %  BP: (130-184)/() 163/105     Weight: 60.3 kg (132 lb 15 oz)  Body mass index is 24.31 kg/m².    Intake/Output Summary (Last 24 hours) at 10/4/2023 1609  Last data filed at 10/4/2023 1528  Gross per 24 hour   Intake 1016.44 ml   Output 3895 ml   Net -2878.56 ml         Physical Exam  Vitals reviewed.   Constitutional:       General: She is not in  acute distress.     Appearance: Normal appearance. She is not ill-appearing, toxic-appearing or diaphoretic.   HENT:      Head: Normocephalic and atraumatic.      Mouth/Throat:      Mouth: Mucous membranes are moist.      Pharynx: Oropharynx is clear.   Eyes:      General: No scleral icterus.     Conjunctiva/sclera: Conjunctivae normal.   Neck:      Vascular: No carotid bruit.   Cardiovascular:      Rate and Rhythm: Normal rate and regular rhythm.      Pulses: Normal pulses.      Heart sounds: Normal heart sounds.   Pulmonary:      Effort: Pulmonary effort is normal.      Breath sounds: Normal breath sounds.   Abdominal:      General: Abdomen is flat. Bowel sounds are normal.      Palpations: Abdomen is soft.      Tenderness: There is abdominal tenderness.   Musculoskeletal:         General: Normal range of motion.   Skin:     General: Skin is warm.   Neurological:      General: No focal deficit present.      Mental Status: She is alert and oriented to person, place, and time. Mental status is at baseline.   Psychiatric:         Mood and Affect: Mood normal.         Behavior: Behavior normal.             Significant Labs: All pertinent labs within the past 24 hours have been reviewed.  BMP:   Recent Labs   Lab 10/04/23  0303   *      K 5.2*      CO2 23   BUN 23*   CREATININE 3.8*   CALCIUM 9.2   MG 2.1     CBC:   Recent Labs   Lab 10/03/23  1001 10/03/23  1325 10/03/23  1521 10/03/23  2105 10/04/23  0303 10/04/23  0438   WBC 6.26  --  4.17  --  6.10  --    HGB 10.3*  --  9.7*  --  11.0*  --    HCT 30.5*   < > 28.2* 29* 33.7* 32*   PLT 95*  --  70*  --  98*  --     < > = values in this interval not displayed.     CMP:   Recent Labs   Lab 10/03/23  1521 10/03/23  1923 10/03/23  2148 10/04/23  0303    138 137 137   K 5.9* 4.1 4.7 5.2*   CL 97 105 104 104   CO2 20* 22* 22* 23   * 121* 117* 133*   BUN 51* 19 19 23*   CREATININE 6.7* 3.0* 3.5* 3.8*   CALCIUM 8.4* 9.7 9.2 9.2   PROT 7.1 7.1   --  6.8   ALBUMIN 4.0 4.0  --  3.7   BILITOT 2.4* 1.6*  --  1.2*   ALKPHOS 233* 252*  --  213*   * 227*  --  123*   * 204*  --  166*   ANIONGAP 20* 11 11 10     Cardiac Markers:   Recent Labs   Lab 10/03/23  1001   BNP 3,572*     Magnesium:   Recent Labs   Lab 10/03/23  1001 10/04/23  0303   MG 2.2 2.1       Significant Imaging: I have reviewed all pertinent imaging results/findings within the past 24 hours.      Assessment/Plan:      * Cardiac arrest  Likely from opioid use (UDS is negative) possible other opioids such as fentanyl is possibility (pt was becoming bradypneac before coding)  Plus with hyperkalemia from missed HD-   Get ECHO  EKG shows prolonged QTc-       Extubated - stable for downgrade to med/tele    Prolonged QT interval    Getting ECHO  Consult cardio if any abnormality    Hypertension  Resuming her all BP meds      Hyperkalemia    S/p HD- K improve    Anemia in ESRD (end-stage renal disease)  At baseline- trend daily CBC      Deep vein thrombosis (DVT) of non-extremity vein  C/w home eliquis      ESRD (end stage renal disease)  C/w regular HD sessions as per nephro      Seizure disorder  C/w home meds        VTE Risk Mitigation (From admission, onward)         Ordered     apixaban tablet 5 mg  2 times daily         10/04/23 1617     IP VTE HIGH RISK PATIENT  Once         10/03/23 1220     Place sequential compression device  Until discontinued         10/03/23 1220                Discharge Planning   TATIANA:      Code Status: Full Code   Is the patient medically ready for discharge?:     Reason for patient still in hospital (select all that apply): Treatment  Discharge Plan A: Home with family                  David Awan MD  Department of Hospital Medicine   [facility]

## 2023-10-04 NOTE — PLAN OF CARE
10/03/23 1956   Patient Assessment/Suction   Level of Consciousness (AVPU) responds to voice   Respiratory Effort Normal;Unlabored   All Lung Fields Breath Sounds coarse   Rhythm/Pattern, Respiratory assisted mechanically   $ Suction Charges Inline Suction Procedure Stat Charge   Secretions Amount moderate   Secretions Color cloudy;pink   Secretions Characteristics thick   Skin Integrity   $ Wound Care Tech Time 15 min   Area Observed Upper lip;Lower lip;Corner lip;Chin;Left;Right;Cheek   PRE-TX-O2   Device (Oxygen Therapy) ventilator   Oxygen Concentration (%) 30   SpO2 99 %   Pulse Oximetry Type Continuous   $ Pulse Oximetry - Multiple Charge Pulse Oximetry - Multiple   Pulse 78   Resp 19        Airway - Non-Surgical 10/03/23 1255   Placement Date/Time: 10/03/23 1255   Method of Intubation: Glidescope  Inserted by: MD  Airway Device Size: 7.5  Style: Cuffed  Cuff Inflation: Minimal occlusive pressure  Cuff Inflated With: Air  Placement Verified By: Auscultation;Colorimetric EtCO2...   Secured at 24 cm   Measured At Lips   Secured Location Center   Secured by Commercial tube ortiz   Bite Block secure and patent   Site Condition Cool;Dry   Status Intact;Secured;Patent   Site Assessment Clean;Dry   Airway Safety   Is Ambu Bag and Mask with Patient? Yes, Adult Ambu Bag and Mask   Suction set is at the bedside? Yes   Vent Select   Conventional Vent Y   Charged w/in last 24h YES   Preset Conventional Ventilator Settings   Vent ID [id number]   Vent Type    Ventilation Type VC   Vent Mode A/C   Humidity HME   Set Rate 16 BPM   Vt Set 350 mL   PEEP/CPAP 5 cmH20   Pressure Support 0 cmH20   Waveform RAMP   Peak Flow 60 L/min   Plateau Set/Insp. Hold (sec) 0   Trigger Sensitivity Flow/I-Trigger 3 L/min   Patient Ventilator Parameters   Resp Rate Total 18 br/min   Peak Airway Pressure 21 cmH20   Mean Airway Pressure 8.7 cmH20   Plateau Pressure 0 cmH20   Exhaled Vt 389 mL   Total Ve 6.18 L/m   I:E Ratio Measured 1:3.90    Conventional Ventilator Alarms   Alarms On Y   Resp Rate High Alarm 40 br/min   Press High Alarm 60 cmH2O   Apnea Rate 24   Apnea Volume (mL) 1 mL   Apnea Oxygen Concentration  100   Apnea Flow Rate (L/min) 60   T Apnea 20 sec(s)   IHI Ventilator Associated Pneumonia Bundle (Required)   Head of Bed Elevated  HOB 30   Oral Care Teeth brushed;Lip moisturizer applied;Mouth moisturizer;Mouth suctioned;with mouthwash   Vent Circut Breaks Minimized Yes   Ready to Wean/Extubation Screen   FIO2<=50 (chart decimal) 0.3   MV<16L (chart vol.) 6.18   PEEP <=8 (chart #) 5   Ready to Wean Parameters   Cough Reflex? Yes   F/VT Ratio<105 (RSBI) (!) 48.84   Education   $ Education Ventilator Oxygen;15 min   Respiratory Evaluation   $ Care Plan Tech Time 15 min

## 2023-10-04 NOTE — HOSPITAL COURSE
Ms. Howard is a 33 year old female pt who has been admitted multiple times came this time with abdominal pain and n/v and missed dialysis was found to have elevated K and during the ultrasound became unresponsive and coded .  UDS negative for opioid but possibility of fentanyl or with hyperkalemia worsening the situation-   Later patient was able to extubated  and  feeling fine and diet was resumed and got HD regularly during admission .  CT abdomen and pelvis was done with no new findings and also CTA chest was done with no PE.  CT brain is negative too.  Eventually patient is doing well and talking on the phone all the time and ate well and discharged in stable condition.   patient is to follow up with Nephrology and PCP in very short period of time.  Patient history is notable  for repeated admissions and high chance of readmission too

## 2023-10-04 NOTE — PROGRESS NOTES
[facility]  Progress Note  Pulmonary/Critical Care      PATIENT NAME: Tabby Howard  MRN: [id number]  TODAY'S DATE: 10/04/2023  11:22 AM  ADMIT DATE: 10/3/2023  AGE: 33 y.o. : 1990    HPI/INTERVAL HISTORY (See H&P for complete P,F,SHx) :   Ms Howard is a 33 year old woman with chronic opioid seeker, ESRD, who presents with missed HD session, found to be hyperkalemia and experienced cardiac arrest while in the ED. Suspicion is respiratory arrest as she was seen to have become less responsive while getting a bedside ultrasound. ED physician noted that she appeared to be hypoventilating and was less responsive leading up to the arrest.        Extubated today, tolerating NC  Bedside exam- no guarding or tenderness on abdominal exam. She was resting comfortably in the bed talking on the phone.     Review of Systems   Constitutional:  Negative for appetite change, chills, fever and unexpected weight change.   HENT:  Negative for nosebleeds, postnasal drip, trouble swallowing and voice change.    Eyes:  Negative for visual disturbance.   Respiratory:  Negative for cough, shortness of breath and wheezing.    Cardiovascular:  Negative for chest pain and leg swelling.   Gastrointestinal:  Negative for diarrhea, nausea and vomiting.   Endocrine: Negative for cold intolerance and heat intolerance.   Genitourinary:  Negative for dysuria, flank pain and frequency.   Musculoskeletal:  Negative for neck pain and neck stiffness.   Allergic/Immunologic: Negative for environmental allergies and immunocompromised state.   Neurological:  Negative for syncope, speech difficulty and weakness.   Hematological:  Negative for adenopathy.   Psychiatric/Behavioral:  Negative for confusion.            VITAL SIGNS (MOST RECENT)  Temp: 98.7 °F (37.1 °C) (10/04/23 110)  Pulse: 86 (10/04/23 110)  Resp: 16 (10/04/23 110)  BP: (!) 164/92 (10/04/23 110)  SpO2: (!) 90 % (10/04/23 1101)    INTAKE AND OUTPUT (LAST 24  HOURS):  Intake/Output Summary (Last 24 hours) at 10/4/2023 1122  Last data filed at 10/4/2023 0901  Gross per 24 hour   Intake 776.44 ml   Output 3895 ml   Net -3118.56 ml       WEIGHT  Wt Readings from Last 1 Encounters:   10/03/23 60.3 kg (132 lb 15 oz)       Physical Exam  Vitals reviewed.   Constitutional:       General: She is not in acute distress.     Appearance: She is well-developed. She is not diaphoretic.   HENT:      Head: Normocephalic and atraumatic.      Mouth/Throat:      Pharynx: No oropharyngeal exudate or posterior oropharyngeal erythema.   Eyes:      General: No scleral icterus.     Pupils: Pupils are equal, round, and reactive to light.   Neck:      Vascular: No JVD.   Cardiovascular:      Rate and Rhythm: Normal rate and regular rhythm.      Heart sounds: Normal heart sounds. No murmur heard.  Pulmonary:      Effort: Pulmonary effort is normal. No respiratory distress.      Breath sounds: No wheezing or rales.   Abdominal:      General: Bowel sounds are normal. There is no distension.      Palpations: Abdomen is soft.      Tenderness: There is no abdominal tenderness.   Musculoskeletal:         General: No swelling.      Cervical back: Normal range of motion and neck supple. No rigidity.   Skin:     General: Skin is warm and dry.      Capillary Refill: Capillary refill takes less than 2 seconds.      Coloration: Skin is not pale.      Findings: No rash.   Neurological:      General: No focal deficit present.      Mental Status: She is alert and oriented to person, place, and time.      Cranial Nerves: No cranial nerve deficit.      Motor: No weakness or abnormal muscle tone.           VENTILATOR SETTINGS  Vent Mode: Spont  Oxygen Concentration (%):  [] 26  Resp Rate Total:  [0 br/min-42 br/min] 13 br/min  Vt Set:  [350 mL] 350 mL  PEEP/CPAP:  [5 cmH20] 5 cmH20  Pressure Support:  [0 cmH20-10 cmH20] 10 cmH20  Mean Airway Pressure:  [-1.9 cmH20-10 cmH20] 7.7 cmH20           LAST ARTERIAL  "BLOOD GAS  ABG  Recent Labs   Lab 10/04/23  0438   PH 7.305*   PO2 92   PCO2 43.6   HCO3 21.7*   BE -5       ACUTE PHASE REACTANT (LAST 24 HOURS)  No results for input(s): "FERRITIN", "CRP", "LDH", "DDIMER" in the last 24 hours.    CBC LAST (LAST 24 HOURS)  Recent Labs   Lab 10/04/23  0303 10/04/23  0438   WBC 6.10  --    RBC 3.70*  --    HGB 11.0*  --    HCT 33.7* 32*   MCV 91  --    MCH 29.7  --    MCHC 32.6  --    RDW 16.7*  --    PLT 98*  --    MPV 10.7  --    GRAN 79.4*  4.8  --    LYMPH 10.2*  0.6*  --    MONO 7.5  0.5  --    BASO 0.03  --    NRBC 0  --        CHEMISTRY LAST (LAST 24 HOURS)  Recent Labs   Lab 10/04/23  0303 10/04/23  0438     --    K 5.2*  --      --    CO2 23  --    ANIONGAP 10  --    BUN 23*  --    CREATININE 3.8*  --    *  --    CALCIUM 9.2  --    PH  --  7.305*   MG 2.1  --    ALBUMIN 3.7  --    PROT 6.8  --    ALKPHOS 213*  --    *  --    *  --    BILITOT 1.2*  --        COAGULATION LAST (LAST 24 HOURS)  No results for input(s): "LABPT", "INR", "APTT" in the last 24 hours.    CARDIAC PROFILE (LAST 24 HOURS)  Recent Labs   Lab 10/03/23  1001 10/03/23  2148   BNP 3,572*  --     57   TROPONINIHS 37.9*  --        LAST 7 DAYS MICROBIOLOGY   Microbiology Results (last 7 days)       Procedure Component Value Units Date/Time    Culture, Respiratory with Gram Stain [[id number]] Collected: 10/03/23 1404    Order Status: Completed Specimen: Respiratory from Tracheal Aspirate Updated: 10/04/23 1025     Respiratory Culture Normal respiratory sebastián     Gram Stain (Respiratory) <10 epithelial cells per low power field.     Gram Stain (Respiratory) Few WBC's     Gram Stain (Respiratory) Few Gram positive cocci    Blood culture [[id number]] Collected: 10/03/23 6996    Order Status: Completed Specimen: Blood Updated: 10/03/23 2117     Blood Culture, Routine No Growth to date            MOST RECENT IMAGING  X-Ray Chest 1 View  Portable chest x-ray at 4:45 AM is " compared to prior study 10/3/2023    Clinical history is shortness of breath    The endotracheal tube, NG tube and dual port right IJ catheter in stable position. The heart is enlarged.    There is improvement of the interstitial and groundglass opacities compatible with resolving edema. There are no new infiltrates or pleural effusions. There are no acute osseous anomalies.    IMPRESSION: Mild cardiomegaly with improvement of the interstitial edema    Electronically signed by:  Rae Solano MD  10/04/2023 08:39 AM CDT Workstation: [id number]      CURRENT VISIT EKG  Results for orders placed or performed during the hospital encounter of 10/03/23   EKG 12-lead    Narrative    Test Reason : I46.9,    Vent. Rate : 078 BPM     Atrial Rate : 078 BPM     P-R Int : 224 ms          QRS Dur : 114 ms      QT Int : 490 ms       P-R-T Axes : 030 -52 055 degrees     QTc Int : 558 ms    Sinus rhythm with 1st degree A-V block  Possible Left atrial enlargement  Left anterior fascicular block  Prolonged QT  Abnormal ECG  When compared with ECG of 03-OCT-2023 09:11,  AK interval has increased  QRS duration has increased    Referred By: [email or web address]   SELF           Confirmed By:        ECHOCARDIOGRAM RESULTS  Results for orders placed during the hospital encounter of 09/10/23    Echo    Interpretation Summary    Left Ventricle: The left ventricle is normal in size. Mildly increased ventricular mass. Mildly increased wall thickness. Normal wall motion. There is low normal systolic function with a visually estimated ejection fraction of 50 - 55%. There is normal diastolic function.    Right Ventricle: Normal right ventricular cavity size. Wall thickness is normal. Right ventricle wall motion  is normal. Systolic function is normal.    IVC/SVC: Normal venous pressure at 3 mmHg.    Pericardium: There is a small circumferential effusion. Pericardial effusion is echolucent. No indication of cardiac tamponade. Evidence includes no chamber  collapse.        ASSESSMENT:     Ms Howard is a 33 year old woman who presented with missed dialysis session, associated hyperkalemia complicated by cardiac arrest.      Chest imaging suggests bilateral nodular ggo likely due to pulmonary edema. Enlarged cardiac silloute.  CT chest suggests bilateral GGO which are suggestive of pulmonary edema and a chronic appearing pericardial effusion.      Extubated today, tolerating NC.     PLAN:   - Nephrology consultation for dialysis   - Extubated this morning  - Avoid opioid medications unless repeat examinations are consistent- on my exam this morning she did not endorse any belly tenderness and was resting comfortably and talking on the phone.   - Continue antibiotics- if she looks ok tomorrow then I think you can stop antibiotics   - Continue heparin ppx- since no active bleeding.   - OK for stepdown from ICU to floor.     Will sign off. Please call with any questions or concerns.       Lobito Eli MD  Date of Service: 10/04/2023  11:22 AM       Upon my evaluation, this patient had a high probability of imminent or life-threatening deterioration, which required my direct attention, intervention, and personal management.    I have personally provided at least 35 minutes of critical care time exclusive of time spent on separately billable procedures. Over 50% of the time of this encounter was spent in direct care at the bedside. Time includes review of laboratory data, radiology results, discussion with consultants, and monitoring for potential decompensation. Interventions were performed as documented above.

## 2023-10-04 NOTE — CONSULTS
INPATIENT NEPHROLOGY CONSULT   [facility] NEPHROLOGY    Tabby Howard  10/04/2023    Reason for consultation:    esrd    Chief Complaint:   Chief Complaint   Patient presents with    Abdominal Pain     Since last night. 10/10. C/O vomiting as well.           History of Present Illness:    Per H and P  33-year-old female history of end-stage renal disease on dialysis Tuesday Thursday Saturday, gastritis diagnosed by EGD December 2022, gastroparesis, DVT, suspected narcotic bowel syndrome, hypotension, type 2 diabetes.  Patient presents to the hospital complaining of pain starting last night, 10 of 10, associated with nausea and vomiting.  Patient's has had several presentations in the past for similar pain, mostly slowly most recently discharged September 2nd for hospitalization regarding abdominal pain.  She has frequent admissions and according to prior providers note from last admission in 2023 alone she is had 17 inpatient admissions, 16 CT abdomen pelvis, 3 CTA of the chest, 22 chest x-rays, 6 KUB.      When I went to evaluate the patient, patient was undergoing rapid quadrant ultrasound for elevated LFTs.  I know she was not responding to the sonographer when asked to take a deep inspiration.  She was not responsive to aggressive stimulation.  She did have a pulse which palpable and then stopped respirations, then began taking labored respirations and then stopped breathing entirely, lost pulse. Code blue was called, received 1epi, 2mg narcan, improved BP. She was intubated. No neurologic response post arrest but was paralyzed for intubation. She will go to [facility],     10/3  intubated.  No distress.   Patient seen on hemodialysis for uremic clearance and ultrafiltration.    10/4  intubated.  No distress.  Sedate.   AFVSS      Plan of Care:       Assessment:    esrd  --continue dialysis per routine  --fluid restrict  --renal dose medication per routine  --continue outpt medication  --continue binders with  meals    Anemia  --erythropoiesis stimulating agent with renal replacement therapy when bp better controlled    Hyperphosphatemia  --renal diet  --continue binders    Hypertension  --uf with hd  --fluid restrict  --low salt diet  --continue home medication    Hyperkalemia--better after urgent hemodialysis 10/3  --2 k bath  --low k diet    Elevated liver enzymes  --better today  --GI consulted  --trending           Thank you for allowing us to participate in this patient's care. We will continue to follow.    Vital Signs:  Temp Readings from Last 3 Encounters:   10/04/23 97 °F (36.1 °C) (Core Esophageal)   23 98.4 °F (36.9 °C) (Oral)   23 97.6 °F (36.4 °C) (Oral)       Pulse Readings from Last 3 Encounters:   10/04/23 73   23 84   23 92       BP Readings from Last 3 Encounters:   10/04/23 (!) 158/90   23 105/77   23 (!) 215/134       Weight:  Wt Readings from Last 3 Encounters:   10/03/23 60.3 kg (132 lb 15 oz)   23 48.9 kg (107 lb 12.9 oz)   23 53.1 kg (117 lb)       Past Medical & Surgical History:  Past Medical History:   Diagnosis Date    ESRD on hemodialysis 2022    Gastritis 2022    EGD was 22    Gastroparesis 2022    has not had Emptying study    Heart failure with preserved ejection fraction 2022    EF 55% on 3/22    History of supraventricular tachycardia     Hyperkalemia 2022    Hypertensive emergency 2022    Sickle cell trait 2022    Type 2 diabetes mellitus        Past Surgical History:   Procedure Laterality Date     SECTION      x 3    COLONOSCOPY      COLONOSCOPY N/A 2022    Procedure: COLONOSCOPY;  Surgeon: Jagdeep Cedeno MD;  Location: [facility];  Service: Endoscopy;  Laterality: N/A;    ESOPHAGOGASTRODUODENOSCOPY N/A 10/18/2019    Procedure: ESOPHAGOGASTRODUODENOSCOPY (EGD);  Surgeon: Gianluca Mendez MD;  Location: [facility];  Service: Endoscopy;  Laterality: N/A;    ESOPHAGOGASTRODUODENOSCOPY  N/A 08/24/2022    Procedure: EGD (ESOPHAGOGASTRODUODENOSCOPY);  Surgeon: Micky Paredes III, MD;  Location: [facility] ENDO;  Service: Endoscopy;  Laterality: N/A;    ESOPHAGOGASTRODUODENOSCOPY N/A 12/5/2022    Procedure: EGD (ESOPHAGOGASTRODUODENOSCOPY);  Surgeon: Marcelo Zhong MD;  Location: [facility] ENDO;  Service: Endoscopy;  Laterality: N/A;    INSERTION, CATHETER, TUNNELED N/A 6/17/2023    Procedure: Insertion,catheter,tunneled;  Surgeon: Carlos Thurman Jr., MD;  Location: [facility] OR;  Service: General;  Laterality: N/A;    LAPAROSCOPIC CHOLECYSTECTOMY N/A 07/30/2022    Procedure: CHOLECYSTECTOMY, LAPAROSCOPIC;  Surgeon: Grey Perez MD;  Location: [facility] OR;  Service: General;  Laterality: N/A;    PLACEMENT OF DUAL-LUMEN VASCULAR CATHETER Left 07/12/2022    Procedure: INSERTION-CATHETER-JOSEPH;  Surgeon: Dionte Gan MD;  Location: [facility] OR;  Service: General;  Laterality: Left;    PLACEMENT OF DUAL-LUMEN VASCULAR CATHETER Right 07/26/2022    Procedure: INSERTION-CATHETER-Hemosplit;  Surgeon: Dionte Gan MD;  Location: [facility] OR;  Service: General;  Laterality: Right;       Past Social History:  Social History     Socioeconomic History    Marital status:    Tobacco Use    Smoking status: Never    Smokeless tobacco: Never   Substance and Sexual Activity    Alcohol use: Not Currently    Drug use: No    Sexual activity: Not Currently     Partners: Male     Birth control/protection: I.U.D.     Social Determinants of Health     Financial Resource Strain: Low Risk  (5/16/2023)    Overall Financial Resource Strain (CARDIA)     Difficulty of Paying Living Expenses: Not very hard   Food Insecurity: No Food Insecurity (5/16/2023)    Hunger Vital Sign     Worried About Running Out of Food in the Last Year: Never true     Ran Out of Food in the Last Year: Never true   Transportation Needs: No Transportation Needs (5/16/2023)    PRAPARE - Transportation     Lack of Transportation (Medical): No     Lack of  Transportation (Non-Medical): No   Physical Activity: Inactive (5/16/2023)    Exercise Vital Sign     Days of Exercise per Week: 0 days     Minutes of Exercise per Session: 0 min   Stress: No Stress Concern Present (5/16/2023)    [employer] [place] [employer] - Occupational Stress Questionnaire     Feeling of Stress : Not at all   Social Connections: Unknown (5/16/2023)    Social Connection and Isolation Panel [NHANES]     Frequency of Communication with Friends and Family: More than three times a week     Frequency of Social Gatherings with Friends and Family: More than three times a week     Attends Shinto Services: Never     Active Member of Clubs or Organizations: No     Attends Club or Organization Meetings: Never     Marital Status: Patient refused   Housing Stability: Low Risk  (5/16/2023)    Housing Stability Vital Sign     Unable to Pay for Housing in the Last Year: No     Number of Places Lived in the Last Year: 1     Unstable Housing in the Last Year: No       Medications:  No current facility-administered medications on file prior to encounter.     Current Outpatient Medications on File Prior to Encounter   Medication Sig Dispense Refill    amLODIPine (NORVASC) 5 MG tablet Take 1 tablet (5 mg total) by mouth once daily. 30 tablet 1    apixaban (ELIQUIS) 5 mg Tab Take 1 tablet (5 mg total) by mouth 2 (two) times daily. 60 tablet 2    carvediloL (COREG) 25 MG tablet Take 1 tablet (25 mg total) by mouth 2 (two) times daily. 60 tablet 5    cetirizine (ZYRTEC) 10 MG tablet Take 1 tablet every day by oral route. (Patient taking differently: Take 10 mg by mouth once daily.) 30 tablet 0    FLUoxetine 20 MG capsule Take 1 capsule (20 mg total) by mouth once daily. 30 capsule 5    fluticasone propionate (FLONASE) 50 mcg/actuation nasal spray [place] 1 spray every day by intranasal route. (Patient taking differently: 1 spray by Each Nostril route Daily.) 16 g 0    gabapentin (NEURONTIN) 300 MG capsule  "Take 2 capsules twice a day by oral route for 90 days. (Patient taking differently: Take 600 mg by mouth 2 (two) times daily.) 360 capsule 1    hydrALAZINE (APRESOLINE) 100 MG tablet Take 1 tablet (100 mg total) by mouth 2 (two) times a day. 60 tablet 2    insulin aspart U-100 (NOVOLOG FLEXPEN U-100 INSULIN) 100 unit/mL (3 mL) InPn pen Inject 8 units 3 times a day by subcutaneous route before meals (Patient taking differently: Inject 8 Units into the skin 3 (three) times daily before meals.) 24 mL 1    insulin detemir U-100, Levemir, (LEVEMIR FLEXTOUCH U100 INSULIN) 100 unit/mL (3 mL) InPn pen Inject 18 units every day by subcutaneous route in the evening 18 mL 1    insulin detemir U-100, Levemir, (LEVEMIR FLEXTOUCH U100 INSULIN) 100 unit/mL (3 mL) InPn pen Inject 21 units every day by subcutaneous route in the evening for 90 days. (Patient taking differently: Inject 21 Units into the skin every evening.) 15 mL 1    isosorbide mononitrate (IMDUR) 30 MG 24 hr tablet Take 1 tablet (30 mg total) by mouth once daily. 30 tablet 1    lancets 33 gauge Misc Use to test twice daily 100 each 5    levETIRAcetam (KEPPRA) 500 MG Tab Take 1 tablet twice a day by oral route for 30 days. (Patient taking differently: Take 500 mg by mouth 2 (two) times daily.) 60 tablet 2    ondansetron (ZOFRAN-ODT) 4 MG TbDL Place 1 tablet (4 mg total) under the tongue every 8 (eight) hours. 24 tablet 2    pantoprazole (PROTONIX) 40 MG tablet Take 1 tablet (40 mg total) by mouth once daily.      pen needle, diabetic 31 gauge x 3/16" Ndle Use as directed to inject insulin 5 times daily 100 each 11    promethazine (PHENERGAN) 25 MG suppository Place 1 suppository (25 mg total) rectally every 6 (six) hours as needed for Nausea. 10 suppository 0    sucroferric oxyhydroxide (VELPHORO) 500 mg Chew Take 1 tablet 3 times a day (Patient taking differently: Take 1 tablet by mouth 3 (three) times daily.) 90 tablet 6    [DISCONTINUED] atenoloL (TENORMIN) 50 MG " "tablet Take 1 tablet (50 mg total) by mouth every other day. 45 tablet 3    [DISCONTINUED] omeprazole (PRILOSEC) 20 MG capsule Take 2 capsules (40 mg total) by mouth once daily. for 10 days 20 capsule 0     Scheduled Meds:   sodium chloride 0.9%   Intravenous Once    cefTRIAXone (ROCEPHIN) IVPB  1 g Intravenous Q24H    heparin (porcine)  5,000 Units Subcutaneous Q8H    insulin aspart U-100  0-10 Units Subcutaneous Q4H    mupirocin   Nasal BID     Continuous Infusions:   propofoL 50 mcg/kg/min (10/04/23 0607)     PRN Meds:.sodium chloride 0.9%, dextrose 50%, dextrose 50%, dextrose 50%, dextrose 50%, glucagon (human recombinant), glucagon (human recombinant), glucagon (human recombinant), glucose, glucose, glucose, glucose, hydrALAZINE, iohexoL, naloxone, prochlorperazine, sodium chloride 0.9%, sodium chloride 0.9%    Allergies:  Penicillins    Past Family History:  Reviewed; refer to Hospitalist Admission Note    Review of Systems:  Review of Systems - All 14 systems reviewed and negative, except as noted in HPI    Physical Exam:    BP (!) 158/90   Pulse 73   Temp 97 °F (36.1 °C) (Core Esophageal)   Resp 19   Ht 5' 2" (1.575 m)   Wt 60.3 kg (132 lb 15 oz)   SpO2 99%   Breastfeeding No   BMI 24.31 kg/m²     General Appearance:    intubated   Head:    Normocephalic, without obvious abnormality, atraumatic   Eyes:    PER, conjunctiva/corneas clear, EOM's intact in both eyes        Throat:   Lips, mucosa, and tongue normal; teeth and gums normal   Back:     Symmetric, no curvature, ROM normal, no CVA tenderness   Lungs:     Clear to auscultation bilaterally, respirations unlabored   Chest wall:    No tenderness or deformity   Heart:    Regular rate and rhythm, S1 and S2 normal, no murmur, rub   or gallop   Abdomen:     Soft, non-tender, bowel sounds active all four quadrants,     no masses, no organomegaly   Extremities:   Extremities normal, atraumatic, no cyanosis or edema   Pulses:   2+ and symmetric all " extremities   MSK:   No joint or muscle swelling, tenderness or deformity   Skin:   Skin color, texture, turgor normal, no rashes or lesions   Neurologic:   intubated     Results:  Lab Results   Component Value Date     10/04/2023    K 5.2 (H) 10/04/2023     10/04/2023    CO2 23 10/04/2023    BUN 23 (H) 10/04/2023    CREATININE 3.8 (H) 10/04/2023    CALCIUM 9.2 10/04/2023    ANIONGAP 10 10/04/2023    ESTGFRAFRICA 19 (L) 09/03/2022    EGFRNONAA 18 (A) 07/31/2022       Lab Results   Component Value Date    CALCIUM 9.2 10/04/2023    PHOS 2.9 09/17/2023       Recent Labs   Lab 10/04/23  0303 10/04/23  0438   WBC 6.10  --    RBC 3.70*  --    HGB 11.0*  --    HCT 33.7* 32*   PLT 98*  --    MCV 91  --    MCH 29.7  --    MCHC 32.6  --             I have personally reviewed pertinent radiological imaging and reports.    Patient care was time spent personally by me on the following activities:   Obtaining a history  Examination of patient.  Providing medical care at the patients bedside.  Developing a treatment plan with patient or surrogate and bedside caregivers  Ordering and reviewing laboratory studies, radiographic studies, pulse oximetry.  Ordering and performing treatments and interventions.  Evaluation of patient's response to treatment.  Discussions with consultants while on the unit and immediately available to the patient.  Re-evaluation of the patient's condition.  Documentation in the medical record.       Hugh Figueroa MD  Nephrology  [facility]  [phone]

## 2023-10-04 NOTE — HPI
33-year-old female history of end-stage renal disease on dialysis Tuesday Thursday Saturday, gastritis diagnosed by EGD December 2022, gastroparesis, DVT, suspected narcotic bowel syndrome, hypotension, type 2 diabetes.  Patient presents to the hospital complaining of pain starting last night, 10 of 10, associated with nausea and vomiting.  Patient's has had several presentations in the past for similar pain, mostly slowly most recently discharged September 2nd for hospitalization regarding abdominal pain.  She has frequent admissions and according to prior providers note from last admission in 2023 alone she is had 17 inpatient admissions, 16 CT abdomen pelvis, 3 CTA of the chest, 22 chest x-rays, 6 KUB.      When I went to evaluate the patient, patient was undergoing rapid quadrant ultrasound for elevated LFTs.  I know she was not responding to the sonographer when asked to take a deep inspiration.  She was not responsive to aggressive stimulation.  She did have a pulse which palpable and then stopped respirations, then began taking labored respirations and then stopped breathing entirely, lost pulse. Code blue was called, received 1epi, 2mg narcan, improved BP. She was intubated. No neurologic response post arrest but was paralyzed for intubation. She will go to [facility],

## 2023-10-04 NOTE — CARE UPDATE
10/04/23 0721   Patient Assessment/Suction   Level of Consciousness (AVPU) responds to pain   Respiratory Effort Normal;Unlabored   Expansion/Accessory Muscles/Retractions no use of accessory muscles   All Lung Fields Breath Sounds coarse   Cough Type assisted   Suction Method tracheal   Suction Pressure (mmHg) -120 mmHg   $ Suction Charges Inline Suction Procedure Stat Charge   Secretions Amount moderate   Secretions Color tan;white   Secretions Characteristics thick   Skin Integrity   $ Wound Care Tech Time 15 min   Area Observed Left;Right;Corner lip;Lower lip;Upper lip;Cheek   Skin Appearance without discoloration   PRE-TX-O2   Device (Oxygen Therapy) ventilator   Oxygen Concentration (%) 26   SpO2 98 %   Pulse Oximetry Type Continuous   $ Pulse Oximetry - Multiple Charge Pulse Oximetry - Multiple   Pulse 73   Resp (!) 25        Airway - Non-Surgical 10/03/23 1255   Placement Date/Time: 10/03/23 1255   Method of Intubation: Glidescope  Inserted by: MD  Airway Device Size: 7.5  Style: Cuffed  Cuff Inflation: Minimal occlusive pressure  Cuff Inflated With: Air  Placement Verified By: Auscultation;Colorimetric EtCO2...   Secured at 24 cm   Measured At Lips   Secured Location Right   Secured by Commercial tube ortiz   Bite Block right;secure and patent   Site Condition Cool;Dry   Status Intact;Secured;Patent   Site Assessment Clean;Dry   Cuff Pressure 26 cm H2O   Airway Safety   Is Ambu Bag and Mask with Patient? Yes, Adult Ambu Bag and Mask   Vent Select   Conventional Vent Y   Charged w/in last 24h YES   Preset Conventional Ventilator Settings   Vent ID [id number]   Vent Type    Ventilation Type VC   Vent Mode A/C   Set Rate 16 BPM   Vt Set 350 mL   PEEP/CPAP 5 cmH20   Pressure Support 0 cmH20   Waveform RAMP   Peak Flow 55 L/min   Plateau Set/Insp. Hold (sec) 0   Trigger Sensitivity Flow/I-Trigger 3 L/min   Patient Ventilator Parameters   Resp Rate Total 16 br/min   Peak Airway Pressure 21 cmH20   Mean Airway  Pressure 8.7 cmH20   Plateau Pressure 0 cmH20   Exhaled Vt 370 mL   Total Ve 5.88 L/m   I:E Ratio Measured 1:3.90   Conventional Ventilator Alarms   Alarms On Y   Resp Rate High Alarm 40 br/min   Press High Alarm 50 cmH2O   Apnea Rate 24   Apnea Volume (mL) 1 mL   Apnea Oxygen Concentration  100   Apnea Flow Rate (L/min) 60   T Apnea 20 sec(s)   Ready to Wean/Extubation Screen   FIO2<=50 (chart decimal) 0.26   MV<16L (chart vol.) 5.88   PEEP <=8 (chart #) 5   Ready to Wean Parameters   F/VT Ratio<105 (RSBI) (!) 67.57   Respiratory Evaluation   $ Care Plan Tech Time 15 min

## 2023-10-04 NOTE — SUBJECTIVE & OBJECTIVE
Interval History: Pt was seen extubated- was complaining of severe abdominal pain- but no chest pain, sob, fever, chills, n/v, urine or bowel problem.     Review of Systems   Constitutional:  Negative for activity change, appetite change, chills and fever.   HENT:  Negative for congestion, ear pain and nosebleeds.    Eyes:  Negative for itching and visual disturbance.   Respiratory:  Negative for apnea, cough, chest tightness, shortness of breath and wheezing.    Cardiovascular:  Negative for chest pain, palpitations and leg swelling.   Gastrointestinal:  Positive for abdominal pain. Negative for abdominal distention, constipation, diarrhea, nausea and vomiting.   Genitourinary:  Negative for dysuria and flank pain.   Musculoskeletal:  Negative for back pain.   Neurological:  Negative for dizziness and headaches.     Objective:     Vital Signs (Most Recent):  Temp: 98.5 °F (36.9 °C) (10/04/23 1501)  Pulse: 85 (10/04/23 1501)  Resp: 20 (10/04/23 1501)  BP: (!) 163/105 (10/04/23 1501)  SpO2: 100 % (10/04/23 1501) Vital Signs (24h Range):  Temp:  [96.1 °F (35.6 °C)-98.7 °F (37.1 °C)] 98.5 °F (36.9 °C)  Pulse:  [71-86] 85  Resp:  [13-27] 20  SpO2:  [90 %-100 %] 100 %  BP: (130-184)/() 163/105     Weight: 60.3 kg (132 lb 15 oz)  Body mass index is 24.31 kg/m².    Intake/Output Summary (Last 24 hours) at 10/4/2023 1609  Last data filed at 10/4/2023 1528  Gross per 24 hour   Intake 1016.44 ml   Output 3895 ml   Net -2878.56 ml         Physical Exam  Vitals reviewed.   Constitutional:       General: She is not in acute distress.     Appearance: Normal appearance. She is not ill-appearing, toxic-appearing or diaphoretic.   HENT:      Head: Normocephalic and atraumatic.      Mouth/Throat:      Mouth: Mucous membranes are moist.      Pharynx: Oropharynx is clear.   Eyes:      General: No scleral icterus.     Conjunctiva/sclera: Conjunctivae normal.   Neck:      Vascular: No carotid bruit.   Cardiovascular:      Rate and  Rhythm: Normal rate and regular rhythm.      Pulses: Normal pulses.      Heart sounds: Normal heart sounds.   Pulmonary:      Effort: Pulmonary effort is normal.      Breath sounds: Normal breath sounds.   Abdominal:      General: Abdomen is flat. Bowel sounds are normal.      Palpations: Abdomen is soft.      Tenderness: There is abdominal tenderness.   Musculoskeletal:         General: Normal range of motion.   Skin:     General: Skin is warm.   Neurological:      General: No focal deficit present.      Mental Status: She is alert and oriented to person, place, and time. Mental status is at baseline.   Psychiatric:         Mood and Affect: Mood normal.         Behavior: Behavior normal.             Significant Labs: All pertinent labs within the past 24 hours have been reviewed.  BMP:   Recent Labs   Lab 10/04/23  0303   *      K 5.2*      CO2 23   BUN 23*   CREATININE 3.8*   CALCIUM 9.2   MG 2.1     CBC:   Recent Labs   Lab 10/03/23  1001 10/03/23  1325 10/03/23  1521 10/03/23  2105 10/04/23  0303 10/04/23  0438   WBC 6.26  --  4.17  --  6.10  --    HGB 10.3*  --  9.7*  --  11.0*  --    HCT 30.5*   < > 28.2* 29* 33.7* 32*   PLT 95*  --  70*  --  98*  --     < > = values in this interval not displayed.     CMP:   Recent Labs   Lab 10/03/23  1521 10/03/23  1923 10/03/23  2148 10/04/23  0303    138 137 137   K 5.9* 4.1 4.7 5.2*   CL 97 105 104 104   CO2 20* 22* 22* 23   * 121* 117* 133*   BUN 51* 19 19 23*   CREATININE 6.7* 3.0* 3.5* 3.8*   CALCIUM 8.4* 9.7 9.2 9.2   PROT 7.1 7.1  --  6.8   ALBUMIN 4.0 4.0  --  3.7   BILITOT 2.4* 1.6*  --  1.2*   ALKPHOS 233* 252*  --  213*   * 227*  --  123*   * 204*  --  166*   ANIONGAP 20* 11 11 10     Cardiac Markers:   Recent Labs   Lab 10/03/23  1001   BNP 3,572*     Magnesium:   Recent Labs   Lab 10/03/23  1001 10/04/23  0303   MG 2.2 2.1       Significant Imaging: I have reviewed all pertinent imaging results/findings within the  past 24 hours.

## 2023-10-04 NOTE — NURSING
Received patient from ICU by stretcher. Patient with no open areas noted. Patient call bell in reach HOB elevated.

## 2023-10-04 NOTE — PLAN OF CARE
[facility]  Initial Discharge Assessment       Primary Care Provider: Melony Kim NP    Admission Diagnosis: Epigastric pain [R10.13]    Admission Date: 10/3/2023  Expected Discharge Date:     Pt intubated.  called Pt's parent (Tanya Howard (parent) [phone] to complete discharge assessment. Demographics, PCP, and insurance verified. No home health. Pt has dialysis at [facility] on [facility] on Tuesday/Thursday/Saturday. Pt's parent reports Pt's ability to complete ADLs with the assistance of: a rolling walker. Pt's parent verbalized plan for Pt to discharge home via family transport. Pt's parent requested a glucometer at discharge. Pt has no other needs to be addressed at this time.    Transition of Care Barriers: None    Payor: MEDICAID / Plan: HEALTHY BLUE [place] / Product Type: Managed Medicaid /     Extended Emergency Contact Information  Primary Emergency Contact: Tanya Howard  Address: [address] [place], [place] [address] [facility]  Home Phone: [phone]  Mobile Phone: [phone]  Relation: Step parent  Preferred language: English   needed? No  Secondary Emergency Contact: Garcia Howard  Address: [address] [place] [address], [place] [address]  Mobile Phone: [phone]  Relation: Father  Preferred language: English   needed? No    Discharge Plan A: Home with family  Discharge Plan B: Home with family      [facility] [address]  [facility] [address]  Phone: [phone] Fax: [phone]      Initial Assessment (most recent)       Adult Discharge Assessment - 10/04/23 1502          Discharge Assessment    Assessment Type Discharge Planning Assessment     Confirmed/corrected address, phone number and insurance Yes     Confirmed Demographics Correct on Facesheet     Source of Information family;health record     If unable to respond/provide information was family/caregiver contacted? Yes      Contact Name/Number Tanya Howard (Step parent)   [phone] (Mobile)     Does patient/caregiver understand observation status Yes     Communicated TATIANA with patient/caregiver Yes     Reason For Admission Cardiac arrest     People in Home parent(s);child(tammy), dependent     Facility Arrived From: Home     Do you expect to return to your current living situation? Yes     Do you have help at home or someone to help you manage your care at home? Yes     Who are your caregiver(s) and their phone number(s)? Tanya Howard (Step parent)   [phone] (Mobile)     Prior to hospitilization cognitive status: Unable to Assess     Current cognitive status: Unable to Assess     Walking or Climbing Stairs ambulation difficulty, requires equipment;stair climbing difficulty, requires equipment;transferring difficulty, requires equipment     Mobility Management Roling walker     Dressing/Bathing bathing difficulty, assistance 1 person;dressing difficulty, assistance 1 person     Dressing/Bathing Management Parents assist     Home Accessibility wheelchair accessible     Home Layout Able to live on 1st floor     Equipment Currently Used at Home walker, rolling     Readmission within 30 days? No     Patient currently being followed by outpatient case management? No     Do you currently have service(s) that help you manage your care at home? No     Do you take prescription medications? Yes     Do you have prescription coverage? Yes     Coverage Payor:  MEDICAID - [employer]ITU [place] ([facility])     Do you have any problems affording any of your prescribed medications? No     Is the patient taking medications as prescribed? yes     Who is going to help you get home at discharge? Tanya Howard (Step parent)   [phone] (Mobile)     How do you get to doctors appointments? family or friend will provide     Are you on dialysis? Yes     Dialysis Name and Scheduled days [facility] [address]     Do you take coumadin? No     DME Needed Upon  Discharge  glucometer     Discharge Plan discussed with: Patient     Transition of Care Barriers None     Discharge Plan A Home with family     Discharge Plan B Home with family        OTHER    Name(s) of People in Home Tanya Howard (Step parent)   [phone] (Mobile);  Garcia Howard Father   [phone]

## 2023-10-04 NOTE — NURSING
Patient wailing in pain. Calls out for nurse frequently. Patient states her stomach and back are in pain. Tylenol ordered per MD Emperatriz and MD Sreedhar. After pain reassessment patient is still wailing in pain. Requests to speak with hospitalist. MD notified.

## 2023-10-05 VITALS
HEART RATE: 83 BPM | OXYGEN SATURATION: 99 % | HEIGHT: 62 IN | RESPIRATION RATE: 18 BRPM | WEIGHT: 132.94 LBS | SYSTOLIC BLOOD PRESSURE: 151 MMHG | BODY MASS INDEX: 24.46 KG/M2 | TEMPERATURE: 98 F | DIASTOLIC BLOOD PRESSURE: 80 MMHG

## 2023-10-05 LAB
ALBUMIN SERPL BCP-MCNC: 3.3 G/DL (ref 3.5–5.2)
ALP SERPL-CCNC: 252 U/L (ref 55–135)
ALT SERPL W/O P-5'-P-CCNC: 112 U/L (ref 10–44)
ANION GAP SERPL CALC-SCNC: 11 MMOL/L (ref 8–16)
ANION GAP SERPL CALC-SCNC: 13 MMOL/L (ref 8–16)
AST SERPL-CCNC: 69 U/L (ref 10–40)
BACTERIA SPEC AEROBE CULT: NORMAL
BASOPHILS # BLD AUTO: 0.04 K/UL (ref 0–0.2)
BASOPHILS NFR BLD: 1 % (ref 0–1.9)
BILIRUB SERPL-MCNC: 0.9 MG/DL (ref 0.1–1)
BUN SERPL-MCNC: 43 MG/DL (ref 6–20)
BUN SERPL-MCNC: 47 MG/DL (ref 6–20)
CALCIUM SERPL-MCNC: 7.5 MG/DL (ref 8.7–10.5)
CALCIUM SERPL-MCNC: 7.5 MG/DL (ref 8.7–10.5)
CHLORIDE SERPL-SCNC: 102 MMOL/L (ref 95–110)
CHLORIDE SERPL-SCNC: 99 MMOL/L (ref 95–110)
CO2 SERPL-SCNC: 21 MMOL/L (ref 23–29)
CO2 SERPL-SCNC: 23 MMOL/L (ref 23–29)
CREAT SERPL-MCNC: 5.6 MG/DL (ref 0.5–1.4)
CREAT SERPL-MCNC: 5.8 MG/DL (ref 0.5–1.4)
DIFFERENTIAL METHOD: ABNORMAL
EOSINOPHIL # BLD AUTO: 0.1 K/UL (ref 0–0.5)
EOSINOPHIL NFR BLD: 3.4 % (ref 0–8)
ERYTHROCYTE [DISTWIDTH] IN BLOOD BY AUTOMATED COUNT: 16.9 % (ref 11.5–14.5)
EST. GFR  (NO RACE VARIABLE): 9.2 ML/MIN/1.73 M^2
EST. GFR  (NO RACE VARIABLE): 9.6 ML/MIN/1.73 M^2
GLUCOSE SERPL-MCNC: 228 MG/DL (ref 70–110)
GLUCOSE SERPL-MCNC: 244 MG/DL (ref 70–110)
GLUCOSE SERPL-MCNC: 319 MG/DL (ref 70–110)
GLUCOSE SERPL-MCNC: 322 MG/DL (ref 70–110)
GLUCOSE SERPL-MCNC: 337 MG/DL (ref 70–110)
GLUCOSE SERPL-MCNC: 351 MG/DL (ref 70–110)
GRAM STN SPEC: NORMAL
HCT VFR BLD AUTO: 29 % (ref 37–48.5)
HGB BLD-MCNC: 9.4 G/DL (ref 12–16)
IMM GRANULOCYTES # BLD AUTO: 0.01 K/UL (ref 0–0.04)
IMM GRANULOCYTES NFR BLD AUTO: 0.3 % (ref 0–0.5)
LYMPHOCYTES # BLD AUTO: 0.7 K/UL (ref 1–4.8)
LYMPHOCYTES NFR BLD: 17.1 % (ref 18–48)
MAGNESIUM SERPL-MCNC: 2.2 MG/DL (ref 1.6–2.6)
MCH RBC QN AUTO: 29.7 PG (ref 27–31)
MCHC RBC AUTO-ENTMCNC: 32.4 G/DL (ref 32–36)
MCV RBC AUTO: 92 FL (ref 82–98)
MONOCYTES # BLD AUTO: 0.3 K/UL (ref 0.3–1)
MONOCYTES NFR BLD: 8.5 % (ref 4–15)
NEUTROPHILS # BLD AUTO: 2.7 K/UL (ref 1.8–7.7)
NEUTROPHILS NFR BLD: 69.7 % (ref 38–73)
NRBC BLD-RTO: 0 /100 WBC
PLATELET # BLD AUTO: 83 K/UL (ref 150–450)
PMV BLD AUTO: 10.9 FL (ref 9.2–12.9)
POTASSIUM SERPL-SCNC: 5.1 MMOL/L (ref 3.5–5.1)
POTASSIUM SERPL-SCNC: 6.4 MMOL/L (ref 3.5–5.1)
PROT SERPL-MCNC: 6.1 G/DL (ref 6–8.4)
RBC # BLD AUTO: 3.16 M/UL (ref 4–5.4)
SODIUM SERPL-SCNC: 133 MMOL/L (ref 136–145)
SODIUM SERPL-SCNC: 136 MMOL/L (ref 136–145)
WBC # BLD AUTO: 3.87 K/UL (ref 3.9–12.7)

## 2023-10-05 PROCEDURE — 25000003 PHARM REV CODE 250: Performed by: STUDENT IN AN ORGANIZED HEALTH CARE EDUCATION/TRAINING PROGRAM

## 2023-10-05 PROCEDURE — 85025 COMPLETE CBC W/AUTO DIFF WBC: CPT | Performed by: STUDENT IN AN ORGANIZED HEALTH CARE EDUCATION/TRAINING PROGRAM

## 2023-10-05 PROCEDURE — 80053 COMPREHEN METABOLIC PANEL: CPT | Performed by: STUDENT IN AN ORGANIZED HEALTH CARE EDUCATION/TRAINING PROGRAM

## 2023-10-05 PROCEDURE — 25000242 PHARM REV CODE 250 ALT 637 W/ HCPCS: Performed by: STUDENT IN AN ORGANIZED HEALTH CARE EDUCATION/TRAINING PROGRAM

## 2023-10-05 PROCEDURE — 83735 ASSAY OF MAGNESIUM: CPT | Performed by: STUDENT IN AN ORGANIZED HEALTH CARE EDUCATION/TRAINING PROGRAM

## 2023-10-05 PROCEDURE — 36415 COLL VENOUS BLD VENIPUNCTURE: CPT | Performed by: STUDENT IN AN ORGANIZED HEALTH CARE EDUCATION/TRAINING PROGRAM

## 2023-10-05 PROCEDURE — 99900035 HC TECH TIME PER 15 MIN (STAT)

## 2023-10-05 PROCEDURE — 90935 HEMODIALYSIS ONE EVALUATION: CPT

## 2023-10-05 PROCEDURE — 94760 N-INVAS EAR/PLS OXIMETRY 1: CPT

## 2023-10-05 PROCEDURE — 80100014 HC HEMODIALYSIS 1:1

## 2023-10-05 PROCEDURE — 63600175 PHARM REV CODE 636 W HCPCS: Mod: JZ | Performed by: STUDENT IN AN ORGANIZED HEALTH CARE EDUCATION/TRAINING PROGRAM

## 2023-10-05 PROCEDURE — 94640 AIRWAY INHALATION TREATMENT: CPT

## 2023-10-05 PROCEDURE — 80048 BASIC METABOLIC PNL TOTAL CA: CPT | Performed by: STUDENT IN AN ORGANIZED HEALTH CARE EDUCATION/TRAINING PROGRAM

## 2023-10-05 PROCEDURE — 63600175 PHARM REV CODE 636 W HCPCS: Performed by: STUDENT IN AN ORGANIZED HEALTH CARE EDUCATION/TRAINING PROGRAM

## 2023-10-05 RX ORDER — CALCIUM GLUCONATE 20 MG/ML
1 INJECTION, SOLUTION INTRAVENOUS ONCE
Status: COMPLETED | OUTPATIENT
Start: 2023-10-05 | End: 2023-10-05

## 2023-10-05 RX ORDER — CEFUROXIME AXETIL 500 MG/1
500 TABLET ORAL EVERY 12 HOURS
Qty: 8 TABLET | Refills: 0 | Status: SHIPPED | OUTPATIENT
Start: 2023-10-05 | End: 2023-10-09

## 2023-10-05 RX ORDER — ALBUTEROL SULFATE 0.83 MG/ML
10 SOLUTION RESPIRATORY (INHALATION) ONCE
Status: COMPLETED | OUTPATIENT
Start: 2023-10-05 | End: 2023-10-05

## 2023-10-05 RX ADMIN — INSULIN ASPART 4 UNITS: 100 INJECTION, SOLUTION INTRAVENOUS; SUBCUTANEOUS at 02:10

## 2023-10-05 RX ADMIN — CARVEDILOL 25 MG: 25 TABLET, FILM COATED ORAL at 08:10

## 2023-10-05 RX ADMIN — ONDANSETRON 4 MG: 4 TABLET, ORALLY DISINTEGRATING ORAL at 05:10

## 2023-10-05 RX ADMIN — ALBUTEROL SULFATE 10 MG: 2.5 SOLUTION RESPIRATORY (INHALATION) at 07:10

## 2023-10-05 RX ADMIN — INSULIN DETEMIR 10 UNITS: 100 INJECTION, SOLUTION SUBCUTANEOUS at 12:10

## 2023-10-05 RX ADMIN — LEVETIRACETAM 500 MG: 500 TABLET, FILM COATED ORAL at 08:10

## 2023-10-05 RX ADMIN — PANTOPRAZOLE SODIUM 40 MG: 40 TABLET, DELAYED RELEASE ORAL at 05:10

## 2023-10-05 RX ADMIN — HYDRALAZINE HYDROCHLORIDE 100 MG: 25 TABLET ORAL at 08:10

## 2023-10-05 RX ADMIN — FLUOXETINE 20 MG: 20 CAPSULE ORAL at 12:10

## 2023-10-05 RX ADMIN — ISOSORBIDE MONONITRATE 30 MG: 30 TABLET, EXTENDED RELEASE ORAL at 12:10

## 2023-10-05 RX ADMIN — INSULIN HUMAN 10 UNITS: 100 INJECTION, SOLUTION PARENTERAL at 07:10

## 2023-10-05 RX ADMIN — APIXABAN 5 MG: 5 TABLET, FILM COATED ORAL at 08:10

## 2023-10-05 RX ADMIN — ERYTHROPOIETIN 3000 UNITS: 10000 INJECTION, SOLUTION INTRAVENOUS; SUBCUTANEOUS at 11:10

## 2023-10-05 RX ADMIN — INSULIN ASPART 8 UNITS: 100 INJECTION, SOLUTION INTRAVENOUS; SUBCUTANEOUS at 05:10

## 2023-10-05 RX ADMIN — MUPIROCIN 1 G: 20 OINTMENT TOPICAL at 08:10

## 2023-10-05 RX ADMIN — AMLODIPINE BESYLATE 5 MG: 5 TABLET ORAL at 12:10

## 2023-10-05 RX ADMIN — INSULIN ASPART 4 UNITS: 100 INJECTION, SOLUTION INTRAVENOUS; SUBCUTANEOUS at 12:10

## 2023-10-05 RX ADMIN — CETIRIZINE HYDROCHLORIDE 10 MG: 5 TABLET ORAL at 12:10

## 2023-10-05 RX ADMIN — CALCIUM GLUCONATE 1 G: 20 INJECTION, SOLUTION INTRAVENOUS at 07:10

## 2023-10-05 NOTE — PROGRESS NOTES
Pt transferred to dialysis 0815. Blood sugar to be checked Q1H X3. Pt received insulin for K shift at 0722. Called diaylsis to report this, RN spoke with Dr. Figueroa who stated MD would be seeing pt and to not do the Q1H accuchecks.

## 2023-10-05 NOTE — DISCHARGE SUMMARY
[facility]  Discharge Summary      Patient Name: Tabby Howard  MRN: [id number]  UINQUE: [id number]  Patient Class: IP- Inpatient  Admission Date: 10/3/2023  Hospital Length of Stay: 1 days  Discharge Date and Time:  10/05/2023 4:25 PM  Attending Physician: No att. 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  Patient presents to the hospital complaining of pain starting last night, 10 of 10, associated with nausea and vomiting.  Patient's has had several presentations in the past for similar pain, mostly slowly most recently discharged September 2nd for hospitalization regarding abdominal pain.  She has frequent admissions and according to prior providers note from last admission in 2023 alone she is had 17 inpatient admissions, 16 CT abdomen pelvis, 3 CTA of the chest, 22 chest x-rays, 6 KUB.      When I went to evaluate the patient, patient was undergoing rapid quadrant ultrasound for elevated LFTs.  I know she was not responding to the sonographer when asked to take a deep inspiration.  She was not responsive to aggressive stimulation.  She did have a pulse which palpable and then stopped respirations, then began taking labored respirations and then stopped breathing entirely, lost pulse. Code blue was called, received 1epi, 2mg narcan, improved BP. She was intubated. No neurologic response post arrest but was paralyzed for intubation. She will go to [facility],       * No surgery found *      Hospital Course:   Ms. Howard is a 33 year old female pt who has been admitted multiple times came this time with abdominal pain and n/v and missed dialysis  was found to have elevated K and during the ultrasound became unresponsive and coded .  UDS negative for opioid but possibility of fentanyl or with hyperkalemia worsening the situation-   Later patient was able to extubated  and  feeling fine and diet was resumed and got HD regularly during admission .  CT abdomen and pelvis was done with no new findings and also CTA chest was done with no PE.  CT brain is negative too.  Eventually patient is doing well and talking on the phone all the time and ate well and discharged in stable condition.   patient is to follow up with Nephrology and PCP in very short period of time.  Patient history is notable  for repeated admissions and high chance of readmission too       Goals of Care Treatment Preferences:  Code Status: Full Code    Health care agent: Step-Mother Tanya Howard / Father Garcia Howard  Health care agent number: [phone] / [phone]                   Consults:   Consults (From admission, onward)        Status Ordering Provider     Inpatient consult to   Once        Provider:  (Not yet assigned)    Completed DEEP MADSEN     Inpatient consult to Intensivist  Once        Provider:  (Not yet assigned)    Completed MONI HOFFMAN     Inpatient consult to Intensivist  Once        Provider:  Iveth Berman MD    Completed MONI HOFFMAN     Inpatient consult to Gastroenterology  Once        Provider:  Micky Paredes III, MD    Completed MONI HOFFMAN     Inpatient consult to Nephrology  Once        Provider:  Hugh Figueroa MD    Completed BRUNA GUZMAN          No new Assessment & Plan notes have been filed under this hospital service since the last note was generated.  Service: Hospital Medicine    Final Active Diagnoses:    Diagnosis Date Noted POA    PRINCIPAL PROBLEM:  Cardiac arrest [I46.9] 10/03/2023 Unknown    Prolonged QT interval [R94.31] 10/03/2023 Unknown    Hypertension [I10] 06/17/2023 Yes     "Hyperkalemia [E87.5] 04/22/2023 Unknown    Anemia in ESRD (end-stage renal disease) [N18.6, D63.1] 03/20/2023 Yes    Deep vein thrombosis (DVT) of non-extremity vein [I82.90] 07/26/2022 Yes     Chronic    ESRD (end stage renal disease) [N18.6] 07/22/2022 Yes    Seizure disorder [G40.909] 05/29/2022 Yes     Chronic      Problems Resolved During this Admission:       Discharged Condition: good    Disposition: Home or Self Care    Follow Up:   Follow-up Information     Melony Kim NP. Go on 10/20/2023.    Specialty: Nurse Practitioner  Why: Follow up appointment scheduled for 10/20 at 11:00 am.  Contact information:  [address] [facility] [address]  [phone]             Hugh Figueroa MD Follow up in 1 week(s).    Specialty: Nephrology  Contact information:  [address] [facility] NEPHROLOGY [facility] [address]  [phone]                       Patient Instructions:      BLOOD GLUCOSE MONITOR FOR HOME USE     Order Specific Question Answer Comments   Height: 5' 2" (1.575 m)    Weight: 60.3 kg (132 lb 15 oz)    Does patient have medical equipment at home? walker, rolling    Length of need (1-99 months): 99      Diet renal     Activity as tolerated       Significant Diagnostic Studies: Labs:   CMP   Recent Labs   Lab 10/03/23  1923 10/03/23  2148 10/04/23  0303 10/05/23  0354 10/05/23  0752      < > 137 133* 136   K 4.1   < > 5.2* 6.4* 5.1      < > 104 99 102   CO2 22*   < > 23 23 21*   *   < > 133* 351* 244*   BUN 19   < > 23* 43* 47*   CREATININE 3.0*   < > 3.8* 5.6* 5.8*   CALCIUM 9.7   < > 9.2 7.5* 7.5*   PROT 7.1  --  6.8 6.1  --    ALBUMIN 4.0  --  3.7 3.3*  --    BILITOT 1.6*  --  1.2* 0.9  --    ALKPHOS 252*  --  213* 252*  --    *  --  123* 69*  --    *  --  166* 112*  --    ANIONGAP 11   < > 10 11 13    < > = values in this interval not displayed.    and CBC   Recent Labs   Lab 10/04/23  0303 10/04/23  0438 10/05/23  0354   WBC 6.10  --  3.87* " "  HGB 11.0*  --  9.4*   HCT 33.7*   < > 29.0*   PLT 98*  --  83*    < > = values in this interval not displayed.       Pending Diagnostic Studies:     None         Medications:  Reconciled Home Medications:      Medication List      START taking these medications    cefUROXime 500 MG tablet  Commonly known as: CEFTIN  Take 1 tablet (500 mg total) by mouth every 12 (twelve) hours. for 4 days        CONTINUE taking these medications    amLODIPine 5 MG tablet  Commonly known as: NORVASC  Take 1 tablet (5 mg total) by mouth once daily.     BD ULTRA-FINE MINI PEN NEEDLE 31 gauge x 3/16" Ndle  Generic drug: pen needle, diabetic  Use as directed to inject insulin 5 times daily     carvediloL 25 MG tablet  Commonly known as: COREG  Take 1 tablet (25 mg total) by mouth 2 (two) times daily.     cetirizine 10 MG tablet  Commonly known as: ZYRTEC  Take 1 tablet every day by oral route.     ELIQUIS 5 mg Tab  Generic drug: apixaban  Take 1 tablet (5 mg total) by mouth 2 (two) times daily.     FLUoxetine 20 MG capsule  Take 1 capsule (20 mg total) by mouth once daily.     fluticasone propionate 50 mcg/actuation nasal spray  Commonly known as: FLONASE  Spray 1 spray every day by intranasal route.     gabapentin 300 MG capsule  Commonly known as: NEURONTIN  Take 2 capsules twice a day by oral route for 90 days.     hydrALAZINE 100 MG tablet  Commonly known as: APRESOLINE  Take 1 tablet (100 mg total) by mouth 2 (two) times a day.     insulin aspart U-100 100 unit/mL (3 mL) Inpn pen  Commonly known as: NovoLOG FlexPen U-100 Insulin  Inject 8 units 3 times a day by subcutaneous route before meals     isosorbide mononitrate 30 MG 24 hr tablet  Commonly known as: IMDUR  Take 1 tablet (30 mg total) by mouth once daily.     * LEVEMIR FLEXTOUCH U100 INSULIN 100 unit/mL (3 mL) Inpn pen  Generic drug: insulin detemir U-100 (Levemir)  Inject 18 units every day by subcutaneous route in the evening     * LEVEMIR FLEXPEN 100 unit/mL (3 mL) Inpn " pen  Generic drug: insulin detemir U-100 (Levemir)  Inject 21 units every day by subcutaneous route in the evening for 90 days.     levETIRAcetam 500 MG Tab  Commonly known as: KEPPRA  Take 1 tablet twice a day by oral route for 30 days.     ondansetron 4 MG Tbdl  Commonly known as: ZOFRAN-ODT  Place 1 tablet (4 mg total) under the tongue every 8 (eight) hours.     pantoprazole 40 MG tablet  Commonly known as: PROTONIX  Take 1 tablet (40 mg total) by mouth once daily.     promethazine 25 MG suppository  Commonly known as: PHENERGAN  Place 1 suppository (25 mg total) rectally every 6 (six) hours as needed for Nausea.     VELPHORO 500 mg Chew  Generic drug: sucroferric oxyhydroxide  Take 1 tablet 3 times a day         * This list has 2 medication(s) that are the same as other medications prescribed for you. Read the directions carefully, and ask your doctor or other care provider to review them with you.            ASK your doctor about these medications    * lancets 33 gauge Misc  Use to test twice daily  Ask about: Which instructions should I use?     * TRUEPLUS LANCETS 33 gauge Misc  Generic drug: lancets  Use 1 to check blood glucose three times daily  Ask about: Which instructions should I use?     TRUE METRIX GLUCOSE METER Misc  Generic drug: blood-glucose meter  Use to check blood glucose     TRUE METRIX GLUCOSE TEST STRIP Strp  Generic drug: blood sugar diagnostic  Use 1 strip to check blood glucose three times daily         * This list has 2 medication(s) that are the same as other medications prescribed for you. Read the directions carefully, and ask your doctor or other care provider to review them with you.                Indwelling Lines/Drains at time of discharge:   Lines/Drains/Airways     Central Venous Catheter Line  Duration                Hemodialysis Catheter 06/17/23 1135 right internal jugular 110 days              General: Patient resting comfortably in no acute distress. Appears as stated age.  Calm  Eyes: EOM intact. No conjunctivae injection. No scleral icterus.  ENT: Hearing grossly intact. No discharge from ears. No nasal discharge.   CVS: RRR. No LE edema BL.  Lungs: CTA BL, no wheezing or crackles. Good breath sounds. No accessory muscle use. No acute respiratory distress  Neuro: non focal , Follows commands. Responds appropriately      Time spent on the discharge of patient: 33 minutes         Armani Gurrola MD  Department of Hospital Medicine  [facility]

## 2023-10-05 NOTE — PROGRESS NOTES
INPATIENT NEPHROLOGY PROGRESS  [facility] NEPHROLOGY    Tabby Howard  10/05/2023    Reason for consultation:    esrd    Chief Complaint:   Chief Complaint   Patient presents with    Abdominal Pain     Since last night. 10/10. C/O vomiting as well.           History of Present Illness:    Per H and P  33-year-old female history of end-stage renal disease on dialysis Tuesday Thursday Saturday, gastritis diagnosed by EGD December 2022, gastroparesis, DVT, suspected narcotic bowel syndrome, hypotension, type 2 diabetes.  Patient presents to the hospital complaining of pain starting last night, 10 of 10, associated with nausea and vomiting.  Patient's has had several presentations in the past for similar pain, mostly slowly most recently discharged September 2nd for hospitalization regarding abdominal pain.  She has frequent admissions and according to prior providers note from last admission in 2023 alone she is had 17 inpatient admissions, 16 CT abdomen pelvis, 3 CTA of the chest, 22 chest x-rays, 6 KUB.      When I went to evaluate the patient, patient was undergoing rapid quadrant ultrasound for elevated LFTs.  I know she was not responding to the sonographer when asked to take a deep inspiration.  She was not responsive to aggressive stimulation.  She did have a pulse which palpable and then stopped respirations, then began taking labored respirations and then stopped breathing entirely, lost pulse. Code blue was called, received 1epi, 2mg narcan, improved BP. She was intubated. No neurologic response post arrest but was paralyzed for intubation. She will go to [facility],     10/3  intubated.  No distress.   Patient seen on hemodialysis for uremic clearance and ultrafiltration.    10/4  intubated.  No distress.  Sedate.   AFVSS  10/5  hyperkalemic this am, got temporizing measures.  Seen on dialysis, NAD--talking on phone        Plan of Care:       Assessment:    esrd  --continue dialysis per routine  --fluid  restrict  --renal dose medication per routine  --continue outpt medication  --continue binders with meals    Anemia  --erythropoiesis stimulating agent with renal replacement therapy when bp better controlled    Hyperphosphatemia  --renal diet  --continue binders    Hypertension  --uf with hd  --fluid restrict  --low salt diet  --continue home medication    Hyperkalemia--better after urgent hemodialysis 10/3  --2 k bath  --low k diet    Elevated liver enzymes  --better today  --GI consulted  --trending           Thank you for allowing us to participate in this patient's care. We will continue to follow.    Vital Signs:  Temp Readings from Last 3 Encounters:   10/05/23 98 °F (36.7 °C)   23 98.4 °F (36.9 °C) (Oral)   23 97.6 °F (36.4 °C) (Oral)       Pulse Readings from Last 3 Encounters:   10/05/23 83   23 84   23 92       BP Readings from Last 3 Encounters:   10/05/23 (!) 152/91   23 105/77   23 (!) 215/134       Weight:  Wt Readings from Last 3 Encounters:   10/03/23 60.3 kg (132 lb 15 oz)   23 48.9 kg (107 lb 12.9 oz)   23 53.1 kg (117 lb)       Past Medical & Surgical History:  Past Medical History:   Diagnosis Date    ESRD on hemodialysis 2022    Gastritis 2022    EGD was 22    Gastroparesis 2022    has not had Emptying study    Heart failure with preserved ejection fraction 2022    EF 55% on 3/22    History of supraventricular tachycardia     Hyperkalemia 2022    Hypertensive emergency 2022    Sickle cell trait 2022    Type 2 diabetes mellitus        Past Surgical History:   Procedure Laterality Date     SECTION      x 3    COLONOSCOPY      COLONOSCOPY N/A 2022    Procedure: COLONOSCOPY;  Surgeon: Jagdeep Cedeno MD;  Location: [facility];  Service: Endoscopy;  Laterality: N/A;    ESOPHAGOGASTRODUODENOSCOPY N/A 10/18/2019    Procedure: ESOPHAGOGASTRODUODENOSCOPY (EGD);  Surgeon: Gianluca Mendez MD;  [place]  [facility] [place]O;  Service: Endoscopy;  Laterality: N/A;    ESOPHAGOGASTRODUODENOSCOPY N/A 08/24/2022    Procedure: EGD (ESOPHAGOGASTRODUODENOSCOPY);  Surgeon: Micky Paredes III, MD;  Location: [facility] ENDO;  Service: Endoscopy;  Laterality: N/A;    ESOPHAGOGASTRODUODENOSCOPY N/A 12/5/2022    Procedure: EGD (ESOPHAGOGASTRODUODENOSCOPY);  Surgeon: Marcelo Zhong MD;  Location: [facility] ENDO;  Service: Endoscopy;  Laterality: N/A;    INSERTION, CATHETER, TUNNELED N/A 6/17/2023    Procedure: Insertion,catheter,tunneled;  Surgeon: Carlos Thurman Jr., MD;  Location: [facility] OR;  Service: General;  Laterality: N/A;    LAPAROSCOPIC CHOLECYSTECTOMY N/A 07/30/2022    Procedure: CHOLECYSTECTOMY, LAPAROSCOPIC;  Surgeon: Grey Perez MD;  Location: [facility] OR;  Service: General;  Laterality: N/A;    PLACEMENT OF DUAL-LUMEN VASCULAR CATHETER Left 07/12/2022    Procedure: INSERTION-CATHETER-JOSEPH;  Surgeon: Dionte Gan MD;  Location: [facility] OR;  Service: General;  Laterality: Left;    PLACEMENT OF DUAL-LUMEN VASCULAR CATHETER Right 07/26/2022    Procedure: INSERTION-CATHETER-Hemosplit;  Surgeon: Dionte Gan MD;  Location: [facility] OR;  Service: General;  Laterality: Right;       Past Social History:  Social History     Socioeconomic History    Marital status:    Tobacco Use    Smoking status: Never    Smokeless tobacco: Never   Substance and Sexual Activity    Alcohol use: Not Currently    Drug use: No    Sexual activity: Not Currently     Partners: Male     Birth control/protection: I.U.D.     Social Determinants of Health     Financial Resource Strain: Low Risk  (5/16/2023)    Overall Financial Resource Strain (CARDIA)     Difficulty of Paying Living Expenses: Not very hard   Food Insecurity: No Food Insecurity (5/16/2023)    Hunger Vital Sign     Worried About Running Out of Food in the Last Year: Never true     Ran Out of Food in the Last Year: Never true   Transportation Needs: No Transportation Needs (5/16/2023)     PRAPARE - Transportation     Lack of Transportation (Medical): No     Lack of Transportation (Non-Medical): No   Physical Activity: Inactive (5/16/2023)    Exercise Vital Sign     Days of Exercise per Week: 0 days     Minutes of Exercise per Session: 0 min   Stress: No Stress Concern Present (5/16/2023)    [place] [employer] - Occupational Stress Questionnaire     Feeling of Stress : Not at all   Social Connections: Unknown (5/16/2023)    Social Connection and Isolation Panel [NHANES]     Frequency of Communication with Friends and Family: More than three times a week     Frequency of Social Gatherings with Friends and Family: More than three times a week     Attends Latter day Services: Never     Active Member of Clubs or Organizations: No     Attends Club or Organization Meetings: Never     Marital Status: Patient refused   Housing Stability: Low Risk  (5/16/2023)    Housing Stability Vital Sign     Unable to Pay for Housing in the Last Year: No     Number of Places Lived in the Last Year: 1     Unstable Housing in the Last Year: No       Medications:  No current facility-administered medications on file prior to encounter.     Current Outpatient Medications on File Prior to Encounter   Medication Sig Dispense Refill    amLODIPine (NORVASC) 5 MG tablet Take 1 tablet (5 mg total) by mouth once daily. 30 tablet 1    apixaban (ELIQUIS) 5 mg Tab Take 1 tablet (5 mg total) by mouth 2 (two) times daily. 60 tablet 2    carvediloL (COREG) 25 MG tablet Take 1 tablet (25 mg total) by mouth 2 (two) times daily. 60 tablet 5    cetirizine (ZYRTEC) 10 MG tablet Take 1 tablet every day by oral route. (Patient taking differently: Take 10 mg by mouth once daily.) 30 tablet 0    FLUoxetine 20 MG capsule Take 1 capsule (20 mg total) by mouth once daily. 30 capsule 5    fluticasone propionate (FLONASE) 50 mcg/actuation nasal spray [place] 1 spray every day by intranasal route. (Patient taking differently: 1  "spray by Each Nostril route Daily.) 16 g 0    gabapentin (NEURONTIN) 300 MG capsule Take 2 capsules twice a day by oral route for 90 days. (Patient taking differently: Take 600 mg by mouth 2 (two) times daily.) 360 capsule 1    hydrALAZINE (APRESOLINE) 100 MG tablet Take 1 tablet (100 mg total) by mouth 2 (two) times a day. 60 tablet 2    insulin aspart U-100 (NOVOLOG FLEXPEN U-100 INSULIN) 100 unit/mL (3 mL) InPn pen Inject 8 units 3 times a day by subcutaneous route before meals (Patient taking differently: Inject 8 Units into the skin 3 (three) times daily before meals.) 24 mL 1    insulin detemir U-100, Levemir, (LEVEMIR FLEXTOUCH U100 INSULIN) 100 unit/mL (3 mL) InPn pen Inject 18 units every day by subcutaneous route in the evening 18 mL 1    insulin detemir U-100, Levemir, (LEVEMIR FLEXTOUCH U100 INSULIN) 100 unit/mL (3 mL) InPn pen Inject 21 units every day by subcutaneous route in the evening for 90 days. (Patient taking differently: Inject 21 Units into the skin every evening.) 15 mL 1    isosorbide mononitrate (IMDUR) 30 MG 24 hr tablet Take 1 tablet (30 mg total) by mouth once daily. 30 tablet 1    lancets 33 gauge Misc Use to test twice daily 100 each 5    levETIRAcetam (KEPPRA) 500 MG Tab Take 1 tablet twice a day by oral route for 30 days. (Patient taking differently: Take 500 mg by mouth 2 (two) times daily.) 60 tablet 2    ondansetron (ZOFRAN-ODT) 4 MG TbDL Place 1 tablet (4 mg total) under the tongue every 8 (eight) hours. 24 tablet 2    pantoprazole (PROTONIX) 40 MG tablet Take 1 tablet (40 mg total) by mouth once daily.      pen needle, diabetic 31 gauge x 3/16" Ndle Use as directed to inject insulin 5 times daily 100 each 11    promethazine (PHENERGAN) 25 MG suppository Place 1 suppository (25 mg total) rectally every 6 (six) hours as needed for Nausea. 10 suppository 0    sucroferric oxyhydroxide (VELPHORO) 500 mg Chew Take 1 tablet 3 times a day (Patient taking differently: Take 1 tablet by " "mouth 3 (three) times daily.) 90 tablet 6    [DISCONTINUED] atenoloL (TENORMIN) 50 MG tablet Take 1 tablet (50 mg total) by mouth every other day. 45 tablet 3    [DISCONTINUED] omeprazole (PRILOSEC) 20 MG capsule Take 2 capsules (40 mg total) by mouth once daily. for 10 days 20 capsule 0     Scheduled Meds:   sodium chloride 0.9%   Intravenous Once    amLODIPine  5 mg Oral Daily    apixaban  5 mg Oral BID    carvediloL  25 mg Oral BID    cefTRIAXone (ROCEPHIN) IVPB  1 g Intravenous Q24H    cetirizine  10 mg Oral Daily    epoetin teresa (PROCRIT) injection  50 Units/kg Intravenous Every Tues, Thurs, Sat    FLUoxetine  20 mg Oral Daily    fluticasone propionate  1 spray Each Nostril Daily    hydrALAZINE  100 mg Oral BID    insulin aspart U-100  0-10 Units Subcutaneous Q4H    insulin detemir U-100 (Levemir)  10 Units Subcutaneous Daily    isosorbide mononitrate  30 mg Oral Daily    levETIRAcetam  500 mg Oral BID    mupirocin   Nasal BID    ondansetron  4 mg Oral Q8H    pantoprazole  40 mg Oral Daily     Continuous Infusions:      PRN Meds:.acetaminophen, dextrose 50%, dextrose 50%, dextrose 50%, dextrose 50%, glucagon (human recombinant), glucagon (human recombinant), glucagon (human recombinant), glucose, glucose, glucose, glucose, hydrALAZINE, iohexoL, naloxone, prochlorperazine, sodium chloride 0.9%    Allergies:  Penicillins    Past Family History:  Reviewed; refer to Hospitalist Admission Note    Review of Systems:  Review of Systems - All 14 systems reviewed and negative, except as noted in HPI    Physical Exam:    BP (!) 152/91   Pulse 83   Temp 98 °F (36.7 °C)   Resp 18   Ht 5' 2" (1.575 m)   Wt 60.3 kg (132 lb 15 oz)   SpO2 99%   Breastfeeding No   BMI 24.31 kg/m²     General Appearance:    intubated   Head:    Normocephalic, without obvious abnormality, atraumatic   Eyes:    PER, conjunctiva/corneas clear, EOM's intact in both eyes        Throat:   Lips, mucosa, and tongue normal; teeth and gums normal " "  Back:     Symmetric, no curvature, ROM normal, no CVA tenderness   Lungs:     Clear to auscultation bilaterally, respirations unlabored   Chest wall:    No tenderness or deformity   Heart:    Regular rate and rhythm, S1 and S2 normal, no murmur, rub   or gallop   Abdomen:     Soft, non-tender, bowel sounds active all four quadrants,     no masses, no organomegaly   Extremities:   Extremities normal, atraumatic, no cyanosis or edema   Pulses:   2+ and symmetric all extremities   MSK:   No joint or muscle swelling, tenderness or deformity   Skin:   Skin color, texture, turgor normal, no rashes or lesions   Neurologic:   intubated     Results:  Lab Results   Component Value Date     (L) 10/05/2023    K 6.4 (HH) 10/05/2023    CL 99 10/05/2023    CO2 23 10/05/2023    BUN 43 (H) 10/05/2023    CREATININE 5.6 (H) 10/05/2023    CALCIUM 7.5 (L) 10/05/2023    ANIONGAP 11 10/05/2023    ESTGFRAFRICA 19 (L) 09/03/2022    EGFRNONAA 18 (A) 07/31/2022       Lab Results   Component Value Date    CALCIUM 7.5 (L) 10/05/2023    PHOS 2.9 09/17/2023       No results for input(s): "WBC", "RBC", "HGB", "HCT", "PLT", "MCV", "MCH", "MCHC" in the last 24 hours.         I have personally reviewed pertinent radiological imaging and reports.    Patient care was time spent personally by me on the following activities:   Obtaining a history  Examination of patient.  Providing medical care at the patients bedside.  Developing a treatment plan with patient or surrogate and bedside caregivers  Ordering and reviewing laboratory studies, radiographic studies, pulse oximetry.  Ordering and performing treatments and interventions.  Evaluation of patient's response to treatment.  Discussions with consultants while on the unit and immediately available to the patient.  Re-evaluation of the patient's condition.  Documentation in the medical record.       Hugh Figueroa MD  Nephrology  [facility]  [phone]          "

## 2023-10-05 NOTE — NURSING
Discharge instructions given to pt. Pt verbalized understanding. PIVs removed. Pt leaving with personal belongings. Meds delivered to bedside. Pt leaving with family.

## 2023-10-05 NOTE — NURSING
Spoke with Varun in dialysis, regarding getting patient dialyzed first thing this am, he will call back in 30 minutes to have patient  brought up for dialysis.

## 2023-10-05 NOTE — PLAN OF CARE
10/04/23 2111   Patient Assessment/Suction   Level of Consciousness (AVPU) alert   Respiratory Effort Normal;Unlabored   Rhythm/Pattern, Respiratory depth regular;pattern regular;unlabored   PRE-TX-O2   Device (Oxygen Therapy) room air   SpO2 95 %   Pulse Oximetry Type Intermittent   $ Pulse Oximetry - Multiple Charge Pulse Oximetry - Multiple   Pulse 85   Resp 18   Education   $ Education 15 min   Respiratory Evaluation   $ Care Plan Tech Time 15 min

## 2023-10-05 NOTE — PROGRESS NOTES
10/05/23 1143   Post-Hemodialysis Assessment   Rinseback Volume (mL) 250 mL   Blood Volume Processed (Liters) 68 L   Dialyzer Clearance Lightly streaked   Duration of Treatment 180 minutes   Additional Fluid Intake (mL) 500 mL   Total UF (mL) 3500 mL   Net Fluid Removal 3000   Patient Response to Treatment tolerated well   Post-Treatment Weight 57.7 kg (127 lb 3.3 oz)   Treatment Weight Change -3   Post-Hemodialysis Comments no problems

## 2023-10-05 NOTE — NURSING
Nurses Note -- 4 Eyes      10/4/2023   7:07 PM      Skin assessed during: Transfer      [x] No Altered Skin Integrity Present    []Prevention Measures Documented      [] Yes- Altered Skin Integrity Present or Discovered   [] LDA Added if Not in Epic (Describe Wound)   [] New Altered Skin Integrity was Present on Admit and Documented in LDA   [] Wound Image Taken    Wound Care Consulted? No    Attending Nurse:  Hali Woo RN/Staff Member:   Leann Johnson RN

## 2023-10-05 NOTE — CARE UPDATE
10/05/23 0727   Patient Assessment/Suction   Level of Consciousness (AVPU) alert   Respiratory Effort Normal;Unlabored   Expansion/Accessory Muscles/Retractions no use of accessory muscles   PRE-TX-O2   Device (Oxygen Therapy) room air   SpO2 99 %   Pulse Oximetry Type Intermittent   $ Pulse Oximetry - Single Charge Pulse Oximetry - Single   Pulse 81   Resp 16   Aerosol Therapy   $ Aerosol Therapy Charges Aerosol Treatment   Daily Review of Necessity (SVN) completed   Respiratory Treatment Status (SVN) given   Treatment Route (SVN) mask;oxygen   Patient Position (SVN) HOB elevated   Post Treatment Assessment (SVN) increased aeration   Signs of Intolerance (SVN) none   Respiratory Evaluation   $ Care Plan Tech Time 15 min

## 2023-10-05 NOTE — PLAN OF CARE
DC orders and chart reviewed. No discharge needs noted.  Patient cleared for discharge from .  Patient is discharging home and will resume current dialysis schedule of TTS at [facility].  Glucometer was requested and ordered to pharmacy by MD.  Patient ok to discharge after dialysis today.         10/05/23 1159   Final Note   Assessment Type Final Discharge Note   Anticipated Discharge Disposition Home   What phone number can be called within the next 1-3 days to see how you are doing after discharge? [phone]   Hospital Resources/Appts/Education Provided Appointments scheduled and added to AVS   Post-Acute Status   Discharge Delays (!) Procedure Scheduling (IR, OR, Labs, Echo, Cath, Echo, EEG)

## 2023-10-05 NOTE — CARE UPDATE
Patients potassium is 6.4, plan for HD today.  Will shift for now  -albuterol 10 mg  -ca gluconate 1 G IV  -insulin 10 units, follow with d50.  Glucose is 330 but she got SS coverage  -repeat bmp 1 hr after above meds are given  -repeat accuck q 1 hr x 3 and use prn d50 as needed

## 2023-10-06 ENCOUNTER — PATIENT OUTREACH (OUTPATIENT)
Dept: ADMINISTRATIVE | Facility: CLINIC | Age: 33
End: 2023-10-06
Payer: MEDICAID

## 2023-10-06 NOTE — PROGRESS NOTES
C3 nurse attempted to contact Tabby Howard and patient's step-mother, Tanya, for a TCC post hospital discharge follow up call. No answer. Left voicemail with callback information. The patient has a scheduled HOSPFU appointment with Melony Kim NP on 10/20/2023 at 11 AM

## 2023-10-06 NOTE — PROGRESS NOTES
2nd Attempt made to reach patient for TCC call.  Patient's father, Garcia, informed me that she is not available. C3 nurse provided Mr. Zelaya with call back information and requested that patient return call, he voiced understanding.

## 2023-10-08 LAB — BACTERIA BLD CULT: NORMAL

## 2023-10-13 PROBLEM — H57.11 EYE PAIN, RIGHT: Status: ACTIVE | Noted: 2023-10-13

## 2023-10-15 ENCOUNTER — HOSPITAL ENCOUNTER (OUTPATIENT)
Facility: HOSPITAL | Age: 33
Discharge: HOME | DRG: 637 | End: 2023-10-18
Attending: EMERGENCY MEDICINE | Admitting: HOSPITALIST
Payer: MEDICAID

## 2023-10-15 DIAGNOSIS — R11.2 NAUSEA AND VOMITING, UNSPECIFIED VOMITING TYPE: ICD-10-CM

## 2023-10-15 DIAGNOSIS — N18.6 ESRD (END STAGE RENAL DISEASE): Primary | ICD-10-CM

## 2023-10-15 DIAGNOSIS — R10.30 LOWER ABDOMINAL PAIN: ICD-10-CM

## 2023-10-15 DIAGNOSIS — R07.9 CHEST PAIN: ICD-10-CM

## 2023-10-15 DIAGNOSIS — E87.29 STARVATION KETOACIDOSIS: ICD-10-CM

## 2023-10-15 DIAGNOSIS — T73.0XXA STARVATION KETOACIDOSIS: ICD-10-CM

## 2023-10-15 DIAGNOSIS — E87.29 KETOACIDOSIS: ICD-10-CM

## 2023-10-15 LAB
ALBUMIN SERPL BCP-MCNC: 3.8 G/DL (ref 3.5–5.2)
ALLENS TEST: ABNORMAL
ALP SERPL-CCNC: 477 U/L (ref 55–135)
ALT SERPL W/O P-5'-P-CCNC: 151 U/L (ref 10–44)
ANION GAP SERPL CALC-SCNC: 14 MMOL/L (ref 8–16)
ANION GAP SERPL CALC-SCNC: 17 MMOL/L (ref 8–16)
ANION GAP SERPL CALC-SCNC: 19 MMOL/L (ref 8–16)
ANION GAP SERPL CALC-SCNC: 22 MMOL/L (ref 8–16)
ANION GAP SERPL CALC-SCNC: 25 MMOL/L (ref 8–16)
AST SERPL-CCNC: 53 U/L (ref 10–40)
B-OH-BUTYR BLD STRIP-SCNC: 5.9 MMOL/L (ref 0–0.5)
BASOPHILS # BLD AUTO: 0.08 K/UL (ref 0–0.2)
BASOPHILS NFR BLD: 0.8 % (ref 0–1.9)
BILIRUB SERPL-MCNC: 2.9 MG/DL (ref 0.1–1)
BUN SERPL-MCNC: 27 MG/DL (ref 6–20)
BUN SERPL-MCNC: 30 MG/DL (ref 6–20)
BUN SERPL-MCNC: 31 MG/DL (ref 6–20)
BUN SERPL-MCNC: 32 MG/DL (ref 6–20)
BUN SERPL-MCNC: 35 MG/DL (ref 6–20)
CALCIUM SERPL-MCNC: 8.5 MG/DL (ref 8.7–10.5)
CALCIUM SERPL-MCNC: 8.8 MG/DL (ref 8.7–10.5)
CALCIUM SERPL-MCNC: 9 MG/DL (ref 8.7–10.5)
CALCIUM SERPL-MCNC: 9.3 MG/DL (ref 8.7–10.5)
CALCIUM SERPL-MCNC: 9.9 MG/DL (ref 8.7–10.5)
CHLORIDE SERPL-SCNC: 104 MMOL/L (ref 95–110)
CHLORIDE SERPL-SCNC: 106 MMOL/L (ref 95–110)
CO2 SERPL-SCNC: 13 MMOL/L (ref 23–29)
CO2 SERPL-SCNC: 14 MMOL/L (ref 23–29)
CO2 SERPL-SCNC: 18 MMOL/L (ref 23–29)
CO2 SERPL-SCNC: 20 MMOL/L (ref 23–29)
CO2 SERPL-SCNC: 20 MMOL/L (ref 23–29)
CREAT SERPL-MCNC: 3.7 MG/DL (ref 0.5–1.4)
CREAT SERPL-MCNC: 4.3 MG/DL (ref 0.5–1.4)
CREAT SERPL-MCNC: 4.6 MG/DL (ref 0.5–1.4)
CREAT SERPL-MCNC: 5 MG/DL (ref 0.5–1.4)
CREAT SERPL-MCNC: 5.3 MG/DL (ref 0.5–1.4)
DELSYS: ABNORMAL
DIFFERENTIAL METHOD: ABNORMAL
EOSINOPHIL # BLD AUTO: 0.1 K/UL (ref 0–0.5)
EOSINOPHIL NFR BLD: 0.5 % (ref 0–8)
ERYTHROCYTE [DISTWIDTH] IN BLOOD BY AUTOMATED COUNT: 17.4 % (ref 11.5–14.5)
EST. GFR  (NO RACE VARIABLE): 10 ML/MIN/1.73 M^2
EST. GFR  (NO RACE VARIABLE): 11 ML/MIN/1.73 M^2
EST. GFR  (NO RACE VARIABLE): 12 ML/MIN/1.73 M^2
EST. GFR  (NO RACE VARIABLE): 13 ML/MIN/1.73 M^2
EST. GFR  (NO RACE VARIABLE): 16 ML/MIN/1.73 M^2
FLOW: 2
GLUCOSE SERPL-MCNC: 106 MG/DL (ref 70–110)
GLUCOSE SERPL-MCNC: 128 MG/DL (ref 70–110)
GLUCOSE SERPL-MCNC: 135 MG/DL (ref 70–110)
GLUCOSE SERPL-MCNC: 180 MG/DL (ref 70–110)
GLUCOSE SERPL-MCNC: 198 MG/DL (ref 70–110)
HCO3 UR-SCNC: 16.1 MMOL/L (ref 24–28)
HCT VFR BLD AUTO: 24 % (ref 37–48.5)
HGB BLD-MCNC: 7.9 G/DL (ref 12–16)
IMM GRANULOCYTES # BLD AUTO: 0.06 K/UL (ref 0–0.04)
IMM GRANULOCYTES NFR BLD AUTO: 0.6 % (ref 0–0.5)
LACTATE SERPL-SCNC: 0.9 MMOL/L (ref 0.5–2.2)
LIPASE SERPL-CCNC: 13 U/L (ref 4–60)
LYMPHOCYTES # BLD AUTO: 0.8 K/UL (ref 1–4.8)
LYMPHOCYTES NFR BLD: 7.3 % (ref 18–48)
MCH RBC QN AUTO: 29.2 PG (ref 27–31)
MCHC RBC AUTO-ENTMCNC: 32.9 G/DL (ref 32–36)
MCV RBC AUTO: 89 FL (ref 82–98)
MODE: ABNORMAL
MONOCYTES # BLD AUTO: 0.5 K/UL (ref 0.3–1)
MONOCYTES NFR BLD: 5.1 % (ref 4–15)
NEUTROPHILS # BLD AUTO: 9 K/UL (ref 1.8–7.7)
NEUTROPHILS NFR BLD: 85.7 % (ref 38–73)
NRBC BLD-RTO: 1 /100 WBC
PCO2 BLDA: 37.8 MMHG (ref 35–45)
PH SMN: 7.24 [PH] (ref 7.35–7.45)
PLATELET # BLD AUTO: 131 K/UL (ref 150–450)
PLATELET BLD QL SMEAR: ABNORMAL
PMV BLD AUTO: 10.2 FL (ref 9.2–12.9)
PO2 BLDA: 37 MMHG (ref 40–60)
POC BE: -11 MMOL/L
POC SATURATED O2: 60 % (ref 95–100)
POC TCO2: 17 MMOL/L (ref 24–29)
POCT GLUCOSE: 101 MG/DL (ref 70–110)
POCT GLUCOSE: 101 MG/DL (ref 70–110)
POCT GLUCOSE: 117 MG/DL (ref 70–110)
POCT GLUCOSE: 124 MG/DL (ref 70–110)
POCT GLUCOSE: 127 MG/DL (ref 70–110)
POCT GLUCOSE: 131 MG/DL (ref 70–110)
POCT GLUCOSE: 136 MG/DL (ref 70–110)
POCT GLUCOSE: 139 MG/DL (ref 70–110)
POCT GLUCOSE: 145 MG/DL (ref 70–110)
POCT GLUCOSE: 148 MG/DL (ref 70–110)
POCT GLUCOSE: 159 MG/DL (ref 70–110)
POCT GLUCOSE: 173 MG/DL (ref 70–110)
POCT GLUCOSE: 178 MG/DL (ref 70–110)
POCT GLUCOSE: 185 MG/DL (ref 70–110)
POCT GLUCOSE: 187 MG/DL (ref 70–110)
POCT GLUCOSE: 207 MG/DL (ref 70–110)
POCT GLUCOSE: 27 MG/DL (ref 70–110)
POCT GLUCOSE: 80 MG/DL (ref 70–110)
POCT GLUCOSE: 81 MG/DL (ref 70–110)
POCT GLUCOSE: 86 MG/DL (ref 70–110)
POCT GLUCOSE: 89 MG/DL (ref 70–110)
POCT GLUCOSE: 91 MG/DL (ref 70–110)
POCT GLUCOSE: 91 MG/DL (ref 70–110)
POTASSIUM SERPL-SCNC: 3.8 MMOL/L (ref 3.5–5.1)
POTASSIUM SERPL-SCNC: 3.8 MMOL/L (ref 3.5–5.1)
POTASSIUM SERPL-SCNC: 3.9 MMOL/L (ref 3.5–5.1)
POTASSIUM SERPL-SCNC: 3.9 MMOL/L (ref 3.5–5.1)
POTASSIUM SERPL-SCNC: 4.1 MMOL/L (ref 3.5–5.1)
PROT SERPL-MCNC: 8.3 G/DL (ref 6–8.4)
RBC # BLD AUTO: 2.71 M/UL (ref 4–5.4)
SAMPLE: ABNORMAL
SITE: ABNORMAL
SODIUM SERPL-SCNC: 140 MMOL/L (ref 136–145)
SODIUM SERPL-SCNC: 142 MMOL/L (ref 136–145)
SODIUM SERPL-SCNC: 142 MMOL/L (ref 136–145)
SODIUM SERPL-SCNC: 143 MMOL/L (ref 136–145)
SODIUM SERPL-SCNC: 143 MMOL/L (ref 136–145)
SP02: 99
WBC # BLD AUTO: 10.54 K/UL (ref 3.9–12.7)

## 2023-10-15 PROCEDURE — 82962 GLUCOSE BLOOD TEST: CPT

## 2023-10-15 PROCEDURE — 99285 EMERGENCY DEPT VISIT HI MDM: CPT

## 2023-10-15 PROCEDURE — 99900035 HC TECH TIME PER 15 MIN (STAT)

## 2023-10-15 PROCEDURE — 83690 ASSAY OF LIPASE: CPT | Performed by: EMERGENCY MEDICINE

## 2023-10-15 PROCEDURE — 25000003 PHARM REV CODE 250: Performed by: HOSPITALIST

## 2023-10-15 PROCEDURE — 25000003 PHARM REV CODE 250: Performed by: EMERGENCY MEDICINE

## 2023-10-15 PROCEDURE — 25000003 PHARM REV CODE 250: Performed by: NURSE PRACTITIONER

## 2023-10-15 PROCEDURE — 27000221 HC OXYGEN, UP TO 24 HOURS

## 2023-10-15 PROCEDURE — 82803 BLOOD GASES ANY COMBINATION: CPT

## 2023-10-15 PROCEDURE — 83605 ASSAY OF LACTIC ACID: CPT | Performed by: EMERGENCY MEDICINE

## 2023-10-15 PROCEDURE — 63600175 PHARM REV CODE 636 W HCPCS: Performed by: EMERGENCY MEDICINE

## 2023-10-15 PROCEDURE — S5010 5% DEXTROSE AND 0.45% SALINE: HCPCS | Performed by: NURSE PRACTITIONER

## 2023-10-15 PROCEDURE — 82010 KETONE BODYS QUAN: CPT | Performed by: EMERGENCY MEDICINE

## 2023-10-15 PROCEDURE — 25000242 PHARM REV CODE 250 ALT 637 W/ HCPCS: Performed by: NURSE PRACTITIONER

## 2023-10-15 PROCEDURE — 94761 N-INVAS EAR/PLS OXIMETRY MLT: CPT | Mod: XB

## 2023-10-15 PROCEDURE — G0378 HOSPITAL OBSERVATION PER HR: HCPCS

## 2023-10-15 PROCEDURE — 80053 COMPREHEN METABOLIC PANEL: CPT | Performed by: EMERGENCY MEDICINE

## 2023-10-15 PROCEDURE — 36415 COLL VENOUS BLD VENIPUNCTURE: CPT | Performed by: EMERGENCY MEDICINE

## 2023-10-15 PROCEDURE — 85025 COMPLETE CBC W/AUTO DIFF WBC: CPT | Performed by: EMERGENCY MEDICINE

## 2023-10-15 PROCEDURE — 20600001 HC STEP DOWN PRIVATE ROOM

## 2023-10-15 PROCEDURE — 80048 BASIC METABOLIC PNL TOTAL CA: CPT | Mod: 91,XB | Performed by: NURSE PRACTITIONER

## 2023-10-15 PROCEDURE — S5010 5% DEXTROSE AND 0.45% SALINE: HCPCS | Performed by: HOSPITALIST

## 2023-10-15 PROCEDURE — 63600175 PHARM REV CODE 636 W HCPCS: Performed by: NURSE PRACTITIONER

## 2023-10-15 PROCEDURE — 11000001 HC ACUTE MED/SURG PRIVATE ROOM

## 2023-10-15 PROCEDURE — 36415 COLL VENOUS BLD VENIPUNCTURE: CPT | Performed by: NURSE PRACTITIONER

## 2023-10-15 RX ORDER — MORPHINE SULFATE 2 MG/ML
2 INJECTION, SOLUTION INTRAMUSCULAR; INTRAVENOUS EVERY 4 HOURS PRN
Status: DISCONTINUED | OUTPATIENT
Start: 2023-10-15 | End: 2023-10-18 | Stop reason: HOSPADM

## 2023-10-15 RX ORDER — TIMOLOL MALEATE 5 MG/ML
1 SOLUTION/ DROPS OPHTHALMIC 2 TIMES DAILY
Status: DISCONTINUED | OUTPATIENT
Start: 2023-10-15 | End: 2023-10-18 | Stop reason: HOSPADM

## 2023-10-15 RX ORDER — FLUOXETINE HYDROCHLORIDE 20 MG/1
20 CAPSULE ORAL DAILY
Status: DISCONTINUED | OUTPATIENT
Start: 2023-10-15 | End: 2023-10-18 | Stop reason: HOSPADM

## 2023-10-15 RX ORDER — IBUPROFEN 200 MG
24 TABLET ORAL
Status: DISCONTINUED | OUTPATIENT
Start: 2023-10-15 | End: 2023-10-18 | Stop reason: HOSPADM

## 2023-10-15 RX ORDER — CETIRIZINE HYDROCHLORIDE 10 MG/1
10 TABLET ORAL DAILY
Status: DISCONTINUED | OUTPATIENT
Start: 2023-10-15 | End: 2023-10-18 | Stop reason: HOSPADM

## 2023-10-15 RX ORDER — MUPIROCIN 20 MG/G
OINTMENT TOPICAL 2 TIMES DAILY
Status: DISCONTINUED | OUTPATIENT
Start: 2023-10-15 | End: 2023-10-18 | Stop reason: HOSPADM

## 2023-10-15 RX ORDER — ACETAMINOPHEN 325 MG/1
650 TABLET ORAL EVERY 8 HOURS PRN
Status: DISCONTINUED | OUTPATIENT
Start: 2023-10-15 | End: 2023-10-18 | Stop reason: HOSPADM

## 2023-10-15 RX ORDER — ISOSORBIDE MONONITRATE 30 MG/1
30 TABLET, EXTENDED RELEASE ORAL DAILY
Status: DISCONTINUED | OUTPATIENT
Start: 2023-10-15 | End: 2023-10-18 | Stop reason: HOSPADM

## 2023-10-15 RX ORDER — CIPROFLOXACIN HYDROCHLORIDE 3 MG/ML
1 SOLUTION/ DROPS OPHTHALMIC EVERY 6 HOURS
Status: DISCONTINUED | OUTPATIENT
Start: 2023-10-15 | End: 2023-10-15

## 2023-10-15 RX ORDER — SODIUM CHLORIDE 0.9 % (FLUSH) 0.9 %
10 SYRINGE (ML) INJECTION EVERY 12 HOURS PRN
Status: DISCONTINUED | OUTPATIENT
Start: 2023-10-15 | End: 2023-10-18 | Stop reason: HOSPADM

## 2023-10-15 RX ORDER — PANTOPRAZOLE SODIUM 40 MG/1
40 TABLET, DELAYED RELEASE ORAL DAILY
Status: DISCONTINUED | OUTPATIENT
Start: 2023-10-15 | End: 2023-10-18 | Stop reason: HOSPADM

## 2023-10-15 RX ORDER — DEXTROSE MONOHYDRATE 100 MG/ML
INJECTION, SOLUTION INTRAVENOUS
Status: DISCONTINUED | OUTPATIENT
Start: 2023-10-15 | End: 2023-10-18 | Stop reason: HOSPADM

## 2023-10-15 RX ORDER — TALC
6 POWDER (GRAM) TOPICAL NIGHTLY PRN
Status: DISCONTINUED | OUTPATIENT
Start: 2023-10-15 | End: 2023-10-18 | Stop reason: HOSPADM

## 2023-10-15 RX ORDER — LEVETIRACETAM 500 MG/1
500 TABLET ORAL 2 TIMES DAILY
Status: DISCONTINUED | OUTPATIENT
Start: 2023-10-15 | End: 2023-10-18 | Stop reason: HOSPADM

## 2023-10-15 RX ORDER — NALOXONE HCL 0.4 MG/ML
0.02 VIAL (ML) INJECTION
Status: DISCONTINUED | OUTPATIENT
Start: 2023-10-15 | End: 2023-10-18 | Stop reason: HOSPADM

## 2023-10-15 RX ORDER — DEXTROSE MONOHYDRATE AND SODIUM CHLORIDE 5; .45 G/100ML; G/100ML
INJECTION, SOLUTION INTRAVENOUS CONTINUOUS PRN
Status: DISCONTINUED | OUTPATIENT
Start: 2023-10-15 | End: 2023-10-18 | Stop reason: HOSPADM

## 2023-10-15 RX ORDER — AMOXICILLIN 250 MG
1 CAPSULE ORAL 2 TIMES DAILY
Status: DISCONTINUED | OUTPATIENT
Start: 2023-10-15 | End: 2023-10-18 | Stop reason: HOSPADM

## 2023-10-15 RX ORDER — FLUTICASONE PROPIONATE 50 MCG
1 SPRAY, SUSPENSION (ML) NASAL DAILY
Status: DISCONTINUED | OUTPATIENT
Start: 2023-10-15 | End: 2023-10-18 | Stop reason: HOSPADM

## 2023-10-15 RX ORDER — CARVEDILOL 25 MG/1
25 TABLET ORAL 2 TIMES DAILY
Status: DISCONTINUED | OUTPATIENT
Start: 2023-10-15 | End: 2023-10-18 | Stop reason: HOSPADM

## 2023-10-15 RX ORDER — MAG HYDROX/ALUMINUM HYD/SIMETH 200-200-20
30 SUSPENSION, ORAL (FINAL DOSE FORM) ORAL 4 TIMES DAILY PRN
Status: DISCONTINUED | OUTPATIENT
Start: 2023-10-15 | End: 2023-10-18 | Stop reason: HOSPADM

## 2023-10-15 RX ORDER — HYDRALAZINE HYDROCHLORIDE 25 MG/1
100 TABLET, FILM COATED ORAL 2 TIMES DAILY
Status: DISCONTINUED | OUTPATIENT
Start: 2023-10-15 | End: 2023-10-18 | Stop reason: HOSPADM

## 2023-10-15 RX ORDER — PREDNISOLONE ACETATE 10 MG/ML
1 SUSPENSION/ DROPS OPHTHALMIC 4 TIMES DAILY
Status: DISCONTINUED | OUTPATIENT
Start: 2023-10-15 | End: 2023-10-18 | Stop reason: HOSPADM

## 2023-10-15 RX ORDER — GLUCAGON 1 MG
1 KIT INJECTION
Status: DISCONTINUED | OUTPATIENT
Start: 2023-10-15 | End: 2023-10-18 | Stop reason: HOSPADM

## 2023-10-15 RX ORDER — SODIUM CHLORIDE 0.9 % (FLUSH) 0.9 %
10 SYRINGE (ML) INJECTION
Status: DISCONTINUED | OUTPATIENT
Start: 2023-10-15 | End: 2023-10-18 | Stop reason: HOSPADM

## 2023-10-15 RX ORDER — GABAPENTIN 300 MG/1
600 CAPSULE ORAL 2 TIMES DAILY
Status: DISCONTINUED | OUTPATIENT
Start: 2023-10-15 | End: 2023-10-18 | Stop reason: HOSPADM

## 2023-10-15 RX ORDER — AMLODIPINE BESYLATE 5 MG/1
5 TABLET ORAL DAILY
Status: DISCONTINUED | OUTPATIENT
Start: 2023-10-15 | End: 2023-10-18 | Stop reason: HOSPADM

## 2023-10-15 RX ORDER — POLYETHYLENE GLYCOL 3350 17 G/17G
17 POWDER, FOR SOLUTION ORAL 2 TIMES DAILY PRN
Status: DISCONTINUED | OUTPATIENT
Start: 2023-10-15 | End: 2023-10-18 | Stop reason: HOSPADM

## 2023-10-15 RX ORDER — KETOROLAC TROMETHAMINE 5 MG/ML
1 SOLUTION OPHTHALMIC 3 TIMES DAILY
Status: DISCONTINUED | OUTPATIENT
Start: 2023-10-15 | End: 2023-10-18 | Stop reason: HOSPADM

## 2023-10-15 RX ORDER — SODIUM CHLORIDE 9 MG/ML
INJECTION, SOLUTION INTRAVENOUS ONCE
Status: CANCELLED | OUTPATIENT
Start: 2023-10-15 | End: 2023-10-15

## 2023-10-15 RX ORDER — DEXTROSE 50 % IN WATER (D50W) INTRAVENOUS SYRINGE
25
Status: COMPLETED | OUTPATIENT
Start: 2023-10-15 | End: 2023-10-15

## 2023-10-15 RX ORDER — ONDANSETRON 2 MG/ML
4 INJECTION INTRAMUSCULAR; INTRAVENOUS EVERY 6 HOURS PRN
Status: DISCONTINUED | OUTPATIENT
Start: 2023-10-15 | End: 2023-10-18 | Stop reason: HOSPADM

## 2023-10-15 RX ORDER — MORPHINE SULFATE 2 MG/ML
2 INJECTION, SOLUTION INTRAMUSCULAR; INTRAVENOUS
Status: COMPLETED | OUTPATIENT
Start: 2023-10-15 | End: 2023-10-15

## 2023-10-15 RX ORDER — IBUPROFEN 200 MG
16 TABLET ORAL
Status: DISCONTINUED | OUTPATIENT
Start: 2023-10-15 | End: 2023-10-18 | Stop reason: HOSPADM

## 2023-10-15 RX ADMIN — PREDNISOLONE ACETATE 1 DROP: 10 SUSPENSION/ DROPS OPHTHALMIC at 11:10

## 2023-10-15 RX ADMIN — CIPROFLOXACIN HYDROCHLORIDE 0.5 INCH: 3 OINTMENT OPHTHALMIC at 09:10

## 2023-10-15 RX ADMIN — SODIUM CHLORIDE 0.1 UNITS/KG/HR: 9 INJECTION, SOLUTION INTRAVENOUS at 05:10

## 2023-10-15 RX ADMIN — ISOSORBIDE MONONITRATE 30 MG: 30 TABLET, EXTENDED RELEASE ORAL at 09:10

## 2023-10-15 RX ADMIN — KETOROLAC TROMETHAMINE 1 DROP: 5 SOLUTION/ DROPS OPHTHALMIC at 02:10

## 2023-10-15 RX ADMIN — APIXABAN 5 MG: 2.5 TABLET, FILM COATED ORAL at 09:10

## 2023-10-15 RX ADMIN — LEVETIRACETAM 500 MG: 500 TABLET, FILM COATED ORAL at 08:10

## 2023-10-15 RX ADMIN — CARVEDILOL 25 MG: 25 TABLET, FILM COATED ORAL at 09:10

## 2023-10-15 RX ADMIN — KETOROLAC TROMETHAMINE 1 DROP: 5 SOLUTION/ DROPS OPHTHALMIC at 11:10

## 2023-10-15 RX ADMIN — PREDNISOLONE ACETATE 1 DROP: 10 SUSPENSION/ DROPS OPHTHALMIC at 08:10

## 2023-10-15 RX ADMIN — DEXTROSE AND SODIUM CHLORIDE: 5; 450 INJECTION, SOLUTION INTRAVENOUS at 08:10

## 2023-10-15 RX ADMIN — GABAPENTIN 600 MG: 300 CAPSULE ORAL at 09:10

## 2023-10-15 RX ADMIN — DOCUSATE SODIUM AND SENNOSIDES 1 TABLET: 8.6; 5 TABLET, FILM COATED ORAL at 08:10

## 2023-10-15 RX ADMIN — TIMOLOL MALEATE 1 DROP: 5 SOLUTION/ DROPS OPHTHALMIC at 08:10

## 2023-10-15 RX ADMIN — DEXTROSE MONOHYDRATE 25 G: 25 INJECTION, SOLUTION INTRAVENOUS at 07:10

## 2023-10-15 RX ADMIN — SODIUM CHLORIDE 0.01 UNITS/KG/HR: 9 INJECTION, SOLUTION INTRAVENOUS at 09:10

## 2023-10-15 RX ADMIN — APIXABAN 5 MG: 2.5 TABLET, FILM COATED ORAL at 08:10

## 2023-10-15 RX ADMIN — KETOROLAC TROMETHAMINE 1 DROP: 5 SOLUTION/ DROPS OPHTHALMIC at 08:10

## 2023-10-15 RX ADMIN — HYDRALAZINE HYDROCHLORIDE 100 MG: 25 TABLET, FILM COATED ORAL at 09:10

## 2023-10-15 RX ADMIN — FLUTICASONE PROPIONATE 50 MCG: 50 SPRAY, METERED NASAL at 05:10

## 2023-10-15 RX ADMIN — CIPROFLOXACIN HYDROCHLORIDE 0.5 INCH: 3 OINTMENT OPHTHALMIC at 05:10

## 2023-10-15 RX ADMIN — DOCUSATE SODIUM AND SENNOSIDES 1 TABLET: 8.6; 5 TABLET, FILM COATED ORAL at 09:10

## 2023-10-15 RX ADMIN — HYDRALAZINE HYDROCHLORIDE 100 MG: 25 TABLET, FILM COATED ORAL at 08:10

## 2023-10-15 RX ADMIN — AMLODIPINE BESYLATE 5 MG: 5 TABLET ORAL at 09:10

## 2023-10-15 RX ADMIN — MORPHINE SULFATE 2 MG: 2 INJECTION, SOLUTION INTRAMUSCULAR; INTRAVENOUS at 04:10

## 2023-10-15 RX ADMIN — MORPHINE SULFATE 2 MG: 2 INJECTION, SOLUTION INTRAMUSCULAR; INTRAVENOUS at 02:10

## 2023-10-15 RX ADMIN — PANTOPRAZOLE SODIUM 40 MG: 40 TABLET, DELAYED RELEASE ORAL at 09:10

## 2023-10-15 RX ADMIN — PROMETHAZINE HYDROCHLORIDE 25 MG: 25 INJECTION INTRAMUSCULAR; INTRAVENOUS at 04:10

## 2023-10-15 RX ADMIN — PREDNISOLONE ACETATE 1 DROP: 10 SUSPENSION/ DROPS OPHTHALMIC at 05:10

## 2023-10-15 RX ADMIN — MELATONIN TAB 3 MG 6 MG: 3 TAB at 08:10

## 2023-10-15 RX ADMIN — DEXTROSE AND SODIUM CHLORIDE: 5; 450 INJECTION, SOLUTION INTRAVENOUS at 07:10

## 2023-10-15 RX ADMIN — MUPIROCIN: 20 OINTMENT TOPICAL at 09:10

## 2023-10-15 RX ADMIN — GABAPENTIN 600 MG: 300 CAPSULE ORAL at 08:10

## 2023-10-15 RX ADMIN — FLUOXETINE 20 MG: 20 CAPSULE ORAL at 09:10

## 2023-10-15 RX ADMIN — CETIRIZINE HYDROCHLORIDE 10 MG: 10 TABLET, FILM COATED ORAL at 09:10

## 2023-10-15 RX ADMIN — ONDANSETRON 4 MG: 2 INJECTION INTRAMUSCULAR; INTRAVENOUS at 10:10

## 2023-10-15 RX ADMIN — TIMOLOL MALEATE 1 DROP: 5 SOLUTION/ DROPS OPHTHALMIC at 09:10

## 2023-10-15 RX ADMIN — MORPHINE SULFATE 2 MG: 2 INJECTION, SOLUTION INTRAMUSCULAR; INTRAVENOUS at 10:10

## 2023-10-15 RX ADMIN — LEVETIRACETAM 500 MG: 500 TABLET, FILM COATED ORAL at 09:10

## 2023-10-15 RX ADMIN — CARVEDILOL 25 MG: 25 TABLET, FILM COATED ORAL at 08:10

## 2023-10-15 NOTE — CONSULTS
Consult Note  Nephrology    Consult Requested By: Linda Cueto MD    Reason for Consult:   ESRD, dependent on dialysis    SUBJECTIVE:     History of Present Illness:   HPI patient is a 33-year-old woman with a history of end-stage renal disease on hemodialysis, gastroparesis, chronic abdominal pain, heart failure with preserved ejection fracture and type 2 diabetes who presented to emergency department early this am complaining of lower abdominal cramping pain radiating to her back since yesterday around noon.  She is had no associated fever.  Of note she was discharged from [facility] around 4:00 p.m. yesterday.  She had been admitted for abdominal pain and right eye pain and was evaluated by ophthalmology.  She received hemodialysis in the hospital yesterday.    She is now admitted to [facility], on insulin gtt.  Nephrology is consulted for dialysis orders.    Assessment/plan:    Assessment:  ESRD on HD TTS  HTN  Hyperkalemia  SHPT  Anemia of CKD     Plan:     - HD TTS, PRN tx if clearance need arises.  Dose all medications for dialysis  - continue home BP meds  - UF 2-4L  - renal diet, 1.5L fluid restriction  - adjust dialysate  - Po4 level in am--currently NPO.  Pt's binders not on formulary here  - ordered SHELLEY with HD    Past Medical History:   Diagnosis Date    ESRD on hemodialysis 2022    Gastritis 2022    EGD was 22    Gastroparesis 2022    has not had Emptying study    Heart failure with preserved ejection fraction 2022    EF 55% on 3/22    History of supraventricular tachycardia     Hyperkalemia 2022    Hypertensive emergency 2022    Sickle cell trait 2022    Type 2 diabetes mellitus      Past Surgical History:   Procedure Laterality Date     SECTION      x 3    COLONOSCOPY      COLONOSCOPY N/A 2022    Procedure: COLONOSCOPY;  Surgeon: Jagdeep Cedeno MD;  Location: [facility];  Service: Endoscopy;  Laterality: N/A;     ESOPHAGOGASTRODUODENOSCOPY N/A 10/18/2019    Procedure: ESOPHAGOGASTRODUODENOSCOPY (EGD);  Surgeon: Gianluca Mendez MD;  Location: [facility] ENDO;  Service: Endoscopy;  Laterality: N/A;    ESOPHAGOGASTRODUODENOSCOPY N/A 08/24/2022    Procedure: EGD (ESOPHAGOGASTRODUODENOSCOPY);  Surgeon: Micky Paredes III, MD;  Location: [facility] ENDO;  Service: Endoscopy;  Laterality: N/A;    ESOPHAGOGASTRODUODENOSCOPY N/A 12/5/2022    Procedure: EGD (ESOPHAGOGASTRODUODENOSCOPY);  Surgeon: Marcelo Zhong MD;  Location: [facility] ENDO;  Service: Endoscopy;  Laterality: N/A;    INSERTION, CATHETER, TUNNELED N/A 6/17/2023    Procedure: Insertion,catheter,tunneled;  Surgeon: Carlos Thurman Jr., MD;  Location: [facility] OR;  Service: General;  Laterality: N/A;    LAPAROSCOPIC CHOLECYSTECTOMY N/A 07/30/2022    Procedure: CHOLECYSTECTOMY, LAPAROSCOPIC;  Surgeon: Grey Perez MD;  Location: [facility] OR;  Service: General;  Laterality: N/A;    PLACEMENT OF DUAL-LUMEN VASCULAR CATHETER Left 07/12/2022    Procedure: INSERTION-CATHETER-JOSEPH;  Surgeon: Dionte Gan MD;  Location: [facility] OR;  Service: General;  Laterality: Left;    PLACEMENT OF DUAL-LUMEN VASCULAR CATHETER Right 07/26/2022    Procedure: INSERTION-CATHETER-Hemosplit;  Surgeon: Dionte Gan MD;  Location: [facility] OR;  Service: General;  Laterality: Right;     Family History   Problem Relation Age of Onset    Diabetes Mother     Diabetes Father      Social History     Tobacco Use    Smoking status: Never    Smokeless tobacco: Never   Substance Use Topics    Alcohol use: Not Currently    Drug use: No       Review of patient's allergies indicates:   Allergen Reactions    Penicillins Hives        Review of Systems:  Sleeping soundly    OBJECTIVE:     Vital Signs Range (Last 24H):  Temp:  [97.6 °F (36.4 °C)-98 °F (36.7 °C)]   Pulse:  [78-86]   Resp:  [17-20]   BP: (113-196)/()   SpO2:  [78 %-100 %]     Physical Exam:  General- NAD noted  HEENT- WNL  Neck- supple  CV- Regular rate  and rhythm  Resp- No increased WOB  GI- Non tender/non-distended  Extrem- No cyanosis, clubbing, edema.  Derm- skin w/d  Neuro-  No flap.     Body mass index is 22.29 kg/m².    Laboratory:  CBC:   Recent Labs   Lab 10/15/23  0244   WBC 10.54   RBC 2.71*   HGB 7.9*   HCT 24.0*   *   MCV 89   MCH 29.2   MCHC 32.9     CMP:   Recent Labs   Lab 10/15/23  0244 10/15/23  0804   * 128*   CALCIUM 9.9 9.3   ALBUMIN 3.8  --    PROT 8.3  --     143   K 4.1 3.8   CO2 13* 18*    106   BUN 27* 30*   CREATININE 3.7* 4.3*   ALKPHOS 477*  --    *  --    AST 53*  --    BILITOT 2.9*  --        Diagnostic Results:  Labs: Reviewed      ASSESSMENT/PLAN:     Active Hospital Problems    Diagnosis  POA    *Ketoacidosis [E87.29]  Yes    GERD (gastroesophageal reflux disease) [K21.9]  Yes    Chronic congestive heart failure with left ventricular diastolic dysfunction [I50.32]  Yes    Type 2 diabetes mellitus with hyperglycemia, with long-term current use of insulin, previous HbA1c 5.8% [E11.65, Z79.4]  Not Applicable    Thrombocytopenia [D69.6]  Yes     Chronic    ESRD (end stage renal disease) [N18.6]  Yes    Seizure disorder [G40.909]  Yes     Chronic      Resolved Hospital Problems   No resolved problems to display.         Thank you for allowing us to participate in the care of your patient. We will follow the patient and provide recommendations as needed.      Time spent seeing patient( greater than 1/2 spent in direct contact) :     Geeta Kaur NP

## 2023-10-15 NOTE — ASSESSMENT & PLAN NOTE
"Patient's FSGs are uncontrolled due to hyperglycemia on current medication regimen.  Last A1c reviewed-   Lab Results   Component Value Date    HGBA1C 5.8 08/01/2023     Most recent fingerstick glucose reviewed- No results for input(s): "POCTGLUCOSE" in the last 24 hours.  Current correctional scale  gtt  Maintain anti-hyperglycemic dose as follows-   Antihyperglycemics (From admission, onward)    Start     Stop Route Frequency Ordered    10/15/23 0430  insulin regular 1 Units/mL in sodium chloride 0.9% 100 mL infusion        Question Answer Comment   Insulin Rate Adjustment (DO NOT MODIFY ANSWER) [email or web address]\[email or web address] ADJUSTMENT DKA version [email or web address]    Initial dose (DO NOT CHANGE): 0.1 units/kg/hr        -- IV Continuous 10/15/23 0411        Hold Oral hypoglycemics while patient is in the hospital.      "

## 2023-10-15 NOTE — ASSESSMENT & PLAN NOTE
Patient is identified as having Diastolic (HFpEF) heart failure that is Chronic. CHF is currently controlled. Latest ECHO performed and demonstrates- Results for orders placed during the hospital encounter of 09/10/23    Echo    Interpretation Summary    Left Ventricle: The left ventricle is normal in size. Mildly increased ventricular mass. Mildly increased wall thickness. Normal wall motion. There is low normal systolic function with a visually estimated ejection fraction of 50 - 55%. There is normal diastolic function.    Right Ventricle: Normal right ventricular cavity size. Wall thickness is normal. Right ventricle wall motion  is normal. Systolic function is normal.    IVC/SVC: Normal venous pressure at 3 mmHg.    Pericardium: There is a small circumferential effusion. Pericardial effusion is echolucent. No indication of cardiac tamponade. Evidence includes no chamber collapse.  . Continue Nitrate/Vasodilator and monitor clinical status closely. Monitor on telemetry. Patient is off CHF pathway.  Monitor strict Is&Os and daily weights.  Place on fluid restriction of no fluid restriction while on gtt. Cardiology has not been any consulted. Continue to stress to patient importance of self efficacy and  on diet for CHF. Last BNP reviewed- and noted below   Recent Labs   Lab 10/13/23  0851   BNP 3,757*   .

## 2023-10-15 NOTE — ED NOTES
Patient is still requesting pain medication advised I would review the chart , next dose not due until after 9am

## 2023-10-15 NOTE — EICU
Intervention Initiated From:  [facility] / EICU    Roxann intervened regarding:  Rounding (Video assessment)    Comments: Video assessment complete. RN at bedside, no eICU needs at present time. VS per flowsheet.

## 2023-10-15 NOTE — HPI
Patient is a 33-year-old female with ESRD on HD, history of DVT, gastroparesis, and type 2 diabetes who presents with abdominal pain. It is severe. It is associated with anorexia and N/V. She denies fever, chills, chest pain, SOB. She was discharged from [facility] on 10/14 after and admission for missed HD an abd pain. She was dialyzed and discharged home. US abd 2 days ago was negative for acute findings. She has recurrent abd pain and this is her 5th admission in 1 month. She initially states she was taking her insulin at home then states she did not take it. In the ED, bicarb 13, anion gap 25, glucose 180 and BHB 5.9, pH 7.23. Hospital medicine is consulted for admission for further work up and treatment.

## 2023-10-15 NOTE — ASSESSMENT & PLAN NOTE
Admit to stepdown  Glucose is 180  Will start D5 1/2NS @75mL/hr and insulin gtt   Likely multifactorial and starvation playing a role   BMPq4h  POCT glucose q1H

## 2023-10-15 NOTE — ED NOTES
Went in to do cbg check and patient was requesting pain medication and was found to have a cbg <27, spoke to the ER MD and D50 was given and insulin drip was stopped

## 2023-10-15 NOTE — SUBJECTIVE & OBJECTIVE
Past Medical History:   Diagnosis Date    ESRD on hemodialysis 2022    Gastritis 2022    EGD was 22    Gastroparesis 2022    has not had Emptying study    Heart failure with preserved ejection fraction 2022    EF 55% on 3/22    History of supraventricular tachycardia     Hyperkalemia 2022    Hypertensive emergency 2022    Sickle cell trait 2022    Type 2 diabetes mellitus        Past Surgical History:   Procedure Laterality Date     SECTION      x 3    COLONOSCOPY      COLONOSCOPY N/A 2022    Procedure: COLONOSCOPY;  Surgeon: Jagdeep Cedeno MD;  Location: [facility];  Service: Endoscopy;  Laterality: N/A;    ESOPHAGOGASTRODUODENOSCOPY N/A 10/18/2019    Procedure: ESOPHAGOGASTRODUODENOSCOPY (EGD);  Surgeon: Gianluca Mendez MD;  Location: [facility];  Service: Endoscopy;  Laterality: N/A;    ESOPHAGOGASTRODUODENOSCOPY N/A 2022    Procedure: EGD (ESOPHAGOGASTRODUODENOSCOPY);  Surgeon: Micky Paredes III, MD;  Location: [facility];  Service: Endoscopy;  Laterality: N/A;    ESOPHAGOGASTRODUODENOSCOPY N/A 2022    Procedure: EGD (ESOPHAGOGASTRODUODENOSCOPY);  Surgeon: Marcelo Zhong MD;  Location: [facility];  Service: Endoscopy;  Laterality: N/A;    INSERTION, CATHETER, TUNNELED N/A 2023    Procedure: Insertion,catheter,tunneled;  Surgeon: Carlos Thurman Jr., MD;  Location: [facility] OR;  Service: General;  Laterality: N/A;    LAPAROSCOPIC CHOLECYSTECTOMY N/A 2022    Procedure: CHOLECYSTECTOMY, LAPAROSCOPIC;  Surgeon: Grey Perez MD;  Location: [facility] OR;  Service: General;  Laterality: N/A;    PLACEMENT OF DUAL-LUMEN VASCULAR CATHETER Left 2022    Procedure: INSERTION-CATHETER-JOSEPH;  Surgeon: Dionte Gan MD;  Location: [facility] OR;  Service: General;  Laterality: Left;    PLACEMENT OF DUAL-LUMEN VASCULAR CATHETER Right 2022    Procedure: INSERTION-CATHETER-Hemosplit;  Surgeon: Dionte Gan MD;  Location: [facility];   Service: General;  Laterality: Right;       Review of patient's allergies indicates:   Allergen Reactions    Penicillins Hives       Current Facility-Administered Medications on File Prior to Encounter   Medication    [DISCONTINUED] acetaminophen tablet 650 mg    [DISCONTINUED] amLODIPine tablet 5 mg    [DISCONTINUED] apixaban tablet 2.5 mg    [DISCONTINUED] apixaban tablet 5 mg    [DISCONTINUED] carvediloL tablet 25 mg    [DISCONTINUED] dextrose 50% injection 12.5 g    [DISCONTINUED] dextrose 50% injection 25 g    [DISCONTINUED] epoetin teresa injection 5,300 Units    [DISCONTINUED] FLUoxetine capsule 20 mg    [DISCONTINUED] gabapentin capsule 300 mg    [DISCONTINUED] gabapentin capsule 400 mg    [DISCONTINUED] glucagon (human recombinant) injection 1 mg    [DISCONTINUED] glucose chewable tablet 16 g    [DISCONTINUED] glucose chewable tablet 24 g    [DISCONTINUED] hydrALAZINE injection 20 mg    [DISCONTINUED] hydrALAZINE tablet 100 mg    [DISCONTINUED] HYDROcodone-acetaminophen 5-325 mg per tablet 1 tablet    [DISCONTINUED] insulin aspart U-100 pen 0-10 Units    [DISCONTINUED] insulin detemir U-100 (Levemir) pen 18 Units    [DISCONTINUED] isosorbide mononitrate 24 hr tablet 30 mg    [DISCONTINUED] levETIRAcetam tablet 500 mg    [DISCONTINUED] ondansetron injection 4 mg    [DISCONTINUED] pantoprazole EC tablet 40 mg    [DISCONTINUED] promethazine (PHENERGAN) 12.5 mg in dextrose 5 % (D5W) 50 mL IVPB    [DISCONTINUED] sodium chloride 0.9% flush 10 mL     Current Outpatient Medications on File Prior to Encounter   Medication Sig    amLODIPine (NORVASC) 5 MG tablet Take 1 tablet (5 mg total) by mouth once daily.    apixaban (ELIQUIS) 5 mg Tab Take 1 tablet (5 mg total) by mouth 2 (two) times daily.    blood sugar diagnostic (TRUE METRIX GLUCOSE TEST STRIP) Strp Use 1 strip to check blood glucose three times daily    blood-glucose meter (TRUE METRIX GLUCOSE METER) Misc Use to check blood glucose    carvediloL (COREG) 25 MG  tablet Take 1 tablet (25 mg total) by mouth 2 (two) times daily.    cetirizine (ZYRTEC) 10 MG tablet Take 1 tablet every day by oral route. (Patient taking differently: Take 10 mg by mouth once daily.)    ciprofloxacin HCl (CILOXAN) 0.3 % ophthalmic solution instill 1 drop into the right eye 4 times a day for 30 days    FLUoxetine 20 MG capsule Take 1 capsule (20 mg total) by mouth once daily.    fluticasone propionate (FLONASE) 50 mcg/actuation nasal spray False Pass 1 spray every day by intranasal route. (Patient taking differently: 1 spray by Each Nostril route Daily.)    gabapentin (NEURONTIN) 300 MG capsule Take 2 capsules twice a day by oral route for 90 days. (Patient taking differently: Take 600 mg by mouth 2 (two) times daily.)    hydrALAZINE (APRESOLINE) 100 MG tablet Take 1 tablet (100 mg total) by mouth 2 (two) times a day.    insulin aspart U-100 (NOVOLOG FLEXPEN U-100 INSULIN) 100 unit/mL (3 mL) InPn pen Inject 8 units 3 times a day by subcutaneous route before meals (Patient taking differently: Inject 8 Units into the skin 3 (three) times daily before meals.)    insulin detemir U-100, Levemir, (LEVEMIR FLEXTOUCH U100 INSULIN) 100 unit/mL (3 mL) InPn pen Inject 18 units every day by subcutaneous route in the evening    insulin detemir U-100, Levemir, (LEVEMIR FLEXTOUCH U100 INSULIN) 100 unit/mL (3 mL) InPn pen Inject 21 units every day by subcutaneous route in the evening for 90 days. (Patient taking differently: Inject 21 Units into the skin every evening.)    isosorbide mononitrate (IMDUR) 30 MG 24 hr tablet Take 1 tablet (30 mg total) by mouth once daily.    ketorolac 0.5% (ACULAR) 0.5 % Drop Instill 1 drop into the right eye 4 times a day for 30 days    lancets (TRUEPLUS LANCETS) 33 gauge Misc Use 1 to check blood glucose three times daily    lancets 33 gauge Misc Use to test twice daily    levETIRAcetam (KEPPRA) 500 MG Tab Take 1 tablet twice a day by oral route for 30 days. (Patient taking  "differently: Take 500 mg by mouth 2 (two) times daily.)    ondansetron (ZOFRAN-ODT) 4 MG TbDL Place 1 tablet (4 mg total) under the tongue every 8 (eight) hours.    pantoprazole (PROTONIX) 40 MG tablet Take 1 tablet (40 mg total) by mouth once daily.    pen needle, diabetic 31 gauge x 3/16" Ndle Use as directed to inject insulin 5 times daily    prednisoLONE acetate (PRED FORTE) 1 % DrpS Instill 1 drops into the right eye 4 times a day for 30 days    promethazine (PHENERGAN) 25 MG suppository Place 1 suppository (25 mg total) rectally every 6 (six) hours as needed for Nausea.    sucroferric oxyhydroxide (VELPHORO) 500 mg Chew Take 1 tablet 3 times a day (Patient taking differently: Take 1 tablet by mouth 3 (three) times daily.)    timolol maleate 0.5% (TIMOPTIC) 0.5 % Drop Instill 1 drop into the right eye 2 times a day for 30 days    [DISCONTINUED] atenoloL (TENORMIN) 50 MG tablet Take 1 tablet (50 mg total) by mouth every other day.    [DISCONTINUED] omeprazole (PRILOSEC) 20 MG capsule Take 2 capsules (40 mg total) by mouth once daily. for 10 days     Family History       Problem Relation (Age of Onset)    Diabetes Mother, Father          Tobacco Use    Smoking status: Never    Smokeless tobacco: Never   Substance and Sexual Activity    Alcohol use: Not Currently    Drug use: No    Sexual activity: Not Currently     Partners: Male     Birth control/protection: I.U.D.     Review of Systems   Constitutional:  Negative for activity change, appetite change, chills, diaphoresis, fatigue, fever and unexpected weight change.   HENT:  Negative for congestion, ear pain, facial swelling, hearing loss, sore throat and trouble swallowing.    Eyes:  Negative for pain and discharge.   Respiratory:  Negative for cough, chest tightness, shortness of breath and wheezing.    Cardiovascular:  Negative for chest pain, palpitations and leg swelling.   Gastrointestinal:  Positive for abdominal pain, nausea and vomiting. Negative for " blood in stool and diarrhea.   Endocrine: Negative for polydipsia, polyphagia and polyuria.   Genitourinary:  Negative for difficulty urinating, dysuria, flank pain, frequency and urgency.   Musculoskeletal:  Negative for arthralgias, back pain, joint swelling, neck pain and neck stiffness.   Skin:  Negative for rash and wound.   Allergic/Immunologic: Negative for environmental allergies and immunocompromised state.   Neurological:  Negative for dizziness, seizures, syncope, speech difficulty, weakness, light-headedness, numbness and headaches.   Hematological:  Negative for adenopathy.   Psychiatric/Behavioral:  Negative for sleep disturbance and suicidal ideas. The patient is not nervous/anxious.    All other systems reviewed and are negative.    Objective:     Vital Signs (Most Recent):  Temp: 98 °F (36.7 °C) (10/15/23 0045)  Pulse: 84 (10/15/23 0353)  Resp: 18 (10/15/23 0451)  BP: (!) 161/92 (10/15/23 0333)  SpO2: 100 % (10/15/23 0353) Vital Signs (24h Range):  Temp:  [97.9 °F (36.6 °C)-98.3 °F (36.8 °C)] 98 °F (36.7 °C)  Pulse:  [77-84] 84  Resp:  [18-20] 18  SpO2:  [89 %-100 %] 100 %  BP: (128-187)/() 161/92     Weight: 55.3 kg (122 lb)  Body mass index is 22.31 kg/m².     Physical Exam  Vitals and nursing note reviewed.   Constitutional:       Appearance: Normal appearance. She is ill-appearing.   HENT:      Head: Normocephalic and atraumatic.      Nose: Nose normal.      Mouth/Throat:      Mouth: Mucous membranes are moist.      Pharynx: Oropharynx is clear.   Eyes:      Extraocular Movements: Extraocular movements intact.      Pupils: Pupils are equal, round, and reactive to light.   Cardiovascular:      Rate and Rhythm: Normal rate and regular rhythm.      Pulses: Normal pulses.   Pulmonary:      Effort: Pulmonary effort is normal.      Breath sounds: Normal breath sounds.   Abdominal:      General: Bowel sounds are normal.      Palpations: Abdomen is soft.      Tenderness: There is guarding.    Musculoskeletal:         General: Normal range of motion.      Cervical back: Normal range of motion and neck supple.   Skin:     General: Skin is warm and dry.      Capillary Refill: Capillary refill takes less than 2 seconds.   Neurological:      General: No focal deficit present.      Mental Status: She is alert and oriented to person, place, and time.   Psychiatric:      Comments: Pt is writhing around in bed               CRANIAL NERVES     CN III, IV, VI   Pupils are equal, round, and reactive to light.       Significant Labs: All pertinent labs within the past 24 hours have been reviewed.  CBC:   Recent Labs   Lab 10/13/23  0847 10/14/23  0411 10/15/23  0244   WBC 4.88 5.09 10.54   HGB 8.3* 7.5* 7.9*   HCT 24.6* 22.4* 24.0*   * 92* 131*     CMP:   Recent Labs   Lab 10/13/23  0847 10/14/23  0411 10/15/23  0244    137 142   K 5.3* 5.0 4.1   CL 99 101 104   CO2 24 25 13*   * 183* 180*   BUN 56* 34* 27*   CREATININE 7.3* 4.6* 3.7*   CALCIUM 9.2 8.4* 9.9   PROT 7.3 6.8 8.3   ALBUMIN 4.1 3.8 3.8   BILITOT 2.4* 3.3* 2.9*   ALKPHOS 401* 375* 477*   AST 99* 66* 53*   * 133* 151*   ANIONGAP 14 11 25*       Significant Imaging: I have reviewed all pertinent imaging results/findings within the past 24 hours.    US Abdomen Limited    Result Date: 10/14/2023  Examination: Limited abdominal sonogram CLINICAL HISTORY: Elevated bilirubin COMPARISON: CT scan the abdomen and pelvis with contrast 10/3/2023. FINDINGS: Liver maintains normal in size measuring 17.0 cm with a clavicular line with evidence of normal uniform echogenicity The gallbladder has been surgically resected. Common duct measures normal in caliber at 5 mm in widest dimension. Appropriate flow is established in the main portal vein and hepatic veins. Visualized pancreas is sonographically normal. The right kidney measures 1.0 cm stable with evidence of mildly increased echodensity pattern attributed towards long-standing medical  renal disease. No evidence of intra-abdominal ascites. No significant pleural effusion pleural cavity. IMPRESSION: 1. No evidence of acute intra-abdominal findings. 2. Echogenic kidney attributed towards long-standing medical renal disease. 3. Status post cholecystectomy. Electronically signed by:  Deandre Vitale MD  10/14/2023 08:54 AM CDT Workstation: [id number]    CT Head Without Contrast    Result Date: 10/14/2023  CMS MANDATED QUALITY DATA - CT RADIATION  436 All CT scans at this facility utilize dose modulation, iterative reconstruction, and/or weight based dosing when appropriate to reduce radiation dose to as low as reasonably achievable. CT of THE HEAD WITHOUT CONTRAST HISTORY: Worsening headache COMPARISON: 10/3/2023 Technical factors:   Spiral acquisition of the brain was generated at 3.0 mm thickness from the skull base to the skull vertex in helical fashion in the absence of intravenous contrast.  Additional coronal and sagittal reconstructed images were also included and reviewed. FINDINGS: There is exquisite differentiation between the gray and white matter. The substance of the brain is relatively well maintained without alteration the attenuation pattern that would reflect intraparenchymal hemorrhage nor mass lesion or acute infarct. There is no evidence of sub nor epidural collections. The ventricles are equal and symmetric. Calvarium appears intact. Stable. Mucoperiosteal thickening involves bilateral maxillary sinuses posteriorly as well as patchy inflammatory density changes throughout the bilateral ethmoid groupings that remains equal in capacity as compared to previous. IMPRESSION: 1. No evidence of acute intracranial findings. 2. Bilateral maxillary sinus disease and marginal sinus disease within the bilateral ethmoid sinuses. Electronically signed by:  Deandre Vitale MD  10/14/2023 07:38 AM CDT Workstation: [id number]    X-Ray Chest AP Portable    Result Date: 10/13/2023  HISTORY:  Congestive heart failure COMPARISON:Comparison is made with patient's previous conventional radiograph is of the chest of 10/4/2023 FINDINGS:Right-sided Gomez catheter has its tip projecting just below the level of the cavoatrial junction. ET tube and NG tube have been removed in the interval. The heart is at the upper edge normal. No evidence of florid pulmonary edema or vascular congestion. No pulmonary infiltrate significant pleural effusion. Tracheal column is midline without evidence of significant shift. Osseous structures reveal no significant finding IMPRESSION:Post removal the patient's ET tube and NG tube. Right-sided dialysis catheter remains in optimal position. No appreciable findings of pulmonary edema. Electronically signed by:  Deandre Vitale MD  10/13/2023 09:49 AM CDT Workstation: [id number]

## 2023-10-15 NOTE — ASSESSMENT & PLAN NOTE
----- Message from Daja Smith sent at 2/24/2022 10:01 AM CST -----  Type:  Pharmacy Calling to Clarify an RX    Name of Caller: Bobo   [facility]   Prescription Name:sildenafil (REVATIO) 20 mg Tab  What do they need to clarify?: Said they got a script- needing directions and refills if any  Best Call Back Number:[phone]  Additional Information: n/a         Consult nephrology for HD management  Daily wt/accurate I&O

## 2023-10-15 NOTE — ED PROVIDER NOTES
Encounter Date: 10/15/2023       History     Chief Complaint   Patient presents with    Abdominal Pain     HPI patient is a 33-year-old woman with a history of end-stage renal disease on hemodialysis, gastroparesis, chronic abdominal pain, heart failure with preserved ejection fracture and type 2 diabetes who presents emergency department complaining of lower abdominal cramping pain radiating to her back since yesterday around noon.  She is had no associated fever.  Of note she was discharged from the hospital side [facility] around 4:00 p.m. yesterday.  She had been admitted for abdominal pain and right eye pain and was evaluated by ophthalmology.  She received hemodialysis in the hospital yesterday.  Patient has not been sexually active in over 2 years.  Patient states she does not make urine.  Review of patient's allergies indicates:   Allergen Reactions    Penicillins Hives     Past Medical History:   Diagnosis Date    ESRD on hemodialysis 2022    Gastritis 2022    EGD was 21    Gastroparesis 2022    has not had Emptying study    Heart failure with preserved ejection fraction 2022    EF 55% on 3/22    History of supraventricular tachycardia     Hyperkalemia 2022    Hypertensive emergency 2022    Sickle cell trait 2022    Type 2 diabetes mellitus      Past Surgical History:   Procedure Laterality Date     SECTION      x 3    COLONOSCOPY      COLONOSCOPY N/A 2022    Procedure: COLONOSCOPY;  Surgeon: Jagdeep Cedeno MD;  Location: [facility];  Service: Endoscopy;  Laterality: N/A;    ESOPHAGOGASTRODUODENOSCOPY N/A 10/18/2019    Procedure: ESOPHAGOGASTRODUODENOSCOPY (EGD);  Surgeon: Gianluca Mendez MD;  Location: [facility];  Service: Endoscopy;  Laterality: N/A;    ESOPHAGOGASTRODUODENOSCOPY N/A 2022    Procedure: EGD (ESOPHAGOGASTRODUODENOSCOPY);  Surgeon: Micky Paredes III, MD;  Location: [facility];  Service: Endoscopy;  Laterality: N/A;     ESOPHAGOGASTRODUODENOSCOPY N/A 12/5/2022    Procedure: EGD (ESOPHAGOGASTRODUODENOSCOPY);  Surgeon: Marcelo Zhong MD;  Location: [facility] ENDO;  Service: Endoscopy;  Laterality: N/A;    INSERTION, CATHETER, TUNNELED N/A 6/17/2023    Procedure: Insertion,catheter,tunneled;  Surgeon: Carlos Thurman Jr., MD;  Location: [facility] OR;  Service: General;  Laterality: N/A;    LAPAROSCOPIC CHOLECYSTECTOMY N/A 07/30/2022    Procedure: CHOLECYSTECTOMY, LAPAROSCOPIC;  Surgeon: Grey Perez MD;  Location: [facility] OR;  Service: General;  Laterality: N/A;    PLACEMENT OF DUAL-LUMEN VASCULAR CATHETER Left 07/12/2022    Procedure: INSERTION-CATHETER-JOSEPH;  Surgeon: Dionte Gan MD;  Location: [facility] OR;  Service: General;  Laterality: Left;    PLACEMENT OF DUAL-LUMEN VASCULAR CATHETER Right 07/26/2022    Procedure: INSERTION-CATHETER-Hemosplit;  Surgeon: Dionte Gan MD;  Location: [facility] OR;  Service: General;  Laterality: Right;     Family History   Problem Relation Age of Onset    Diabetes Mother     Diabetes Father      Social History     Tobacco Use    Smoking status: Never    Smokeless tobacco: Never   Substance Use Topics    Alcohol use: Not Currently    Drug use: No     Review of Systems   Constitutional:  Negative for fever.   Respiratory:  Negative for shortness of breath.    Cardiovascular:  Negative for chest pain.   Gastrointestinal:  Positive for abdominal pain.   Musculoskeletal:  Positive for back pain.       Physical Exam     Initial Vitals [10/15/23 0045]   BP Pulse Resp Temp SpO2   (!) 149/85 80 20 98 °F (36.7 °C) 98 %      MAP       --         Physical Exam    Constitutional: Vital signs are normal. She appears well-developed and well-nourished.  Non-toxic appearance. No distress.   Sleeping comfortably when I walked in the room.   HENT:   Head: Normocephalic and atraumatic.   Eyes: EOM are normal. Pupils are equal, round, and reactive to light.   Neck: Neck supple. No JVD present.   Normal range of  motion.  Cardiovascular:  Normal rate, regular rhythm, normal heart sounds and intact distal pulses.     Exam reveals no gallop and no friction rub.       No murmur heard.  Pulmonary/Chest: Breath sounds normal. She has no wheezes. She has no rhonchi. She has no rales.   Abdominal: Abdomen is soft. Bowel sounds are normal. There is abdominal tenderness (suprapubic). There is no rebound and no guarding.   Musculoskeletal:         General: Tenderness (Diffuse paraspinal lumbar tenderness.) present. Normal range of motion.      Cervical back: Normal range of motion and neck supple. No rigidity.     Neurological: She is alert and oriented to person, place, and time. She has normal strength and normal reflexes. No cranial nerve deficit or sensory deficit. She exhibits normal muscle tone. Coordination normal. GCS eye subscore is 4. GCS verbal subscore is 5. GCS motor subscore is 6.   Skin: Skin is warm and dry. Capillary refill takes less than 2 seconds.   Psychiatric: She has a normal mood and affect. Her speech is normal and behavior is normal. She is not actively hallucinating.         ED Course   Procedures  Labs Reviewed   CBC W/ AUTO DIFFERENTIAL - Abnormal; Notable for the following components:       Result Value    RBC 2.71 (*)     Hemoglobin 7.9 (*)     Hematocrit 24.0 (*)     RDW 17.4 (*)     Platelets 131 (*)     Immature Granulocytes 0.6 (*)     Gran # (ANC) 9.0 (*)     Immature Grans (Abs) 0.06 (*)     Lymph # 0.8 (*)     nRBC 1 (*)     Gran % 85.7 (*)     Lymph % 7.3 (*)     Platelet Estimate Decreased (*)     All other components within normal limits   COMPREHENSIVE METABOLIC PANEL - Abnormal; Notable for the following components:    CO2 13 (*)     Glucose 180 (*)     BUN 27 (*)     Creatinine 3.7 (*)     Total Bilirubin 2.9 (*)     Alkaline Phosphatase 477 (*)     AST 53 (*)      (*)     eGFR 16 (*)     Anion Gap 25 (*)     All other components within normal limits   BETA - HYDROXYBUTYRATE, SERUM -  Abnormal; Notable for the following components:    Beta-Hydroxybutyrate 5.9 (*)     All other components within normal limits   BASIC METABOLIC PANEL - Abnormal; Notable for the following components:    CO2 18 (*)     Glucose 128 (*)     BUN 30 (*)     Creatinine 4.3 (*)     Anion Gap 19 (*)     eGFR 13 (*)     All other components within normal limits   ISTAT PROCEDURE - Abnormal; Notable for the following components:    POC PH 7.239 (*)     POC PO2 37 (*)     POC HCO3 16.1 (*)     POC TCO2 17 (*)     All other components within normal limits   POCT GLUCOSE - Abnormal; Notable for the following components:    POCT Glucose 207 (*)     All other components within normal limits   POCT GLUCOSE - Abnormal; Notable for the following components:    POCT Glucose 27 (*)     All other components within normal limits   POCT GLUCOSE - Abnormal; Notable for the following components:    POCT Glucose 136 (*)     All other components within normal limits   LIPASE   LACTIC ACID, PLASMA   POCT GLUCOSE          Imaging Results    None          Medications   morphine injection 2 mg (2 mg Intravenous Given 10/15/23 4092)   promethazine (PHENERGAN) 25 mg in dextrose 5 % (D5W) 50 mL IVPB (0 mg Intravenous Stopped 10/15/23 0512)   dextrose 50 % in water (D50W) injection 25 g (25 g Intravenous Given 10/15/23 1398)     Medical Decision Making  33-year-old woman with a history of end-stage renal disease on hemodialysis, gastroparesis, chronic abdominal pain, heart failure with preserved ejection fracture and type 2 diabetes who presents emergency department complaining of lower abdominal cramping pain radiating to her back since yesterday around noon.  She is had no associated fever.  Of note she was discharged from the [facility] around 4:00 p.m. yesterday.  She had been admitted for abdominal pain and right eye pain and was evaluated by ophthalmology.  She received hemodialysis in the hospital yesterday.  Patient has not  been sexually active in over 2 years.  Patient states she does not make urine.  Patient is noted to have a glucose of 180 with a CO2 of 13 and an anion gap that is elevated at 25.  She does have elevated beta hydroxybutyrate of 5.9 and a VBG of 7.239.  Differential diagnosis includes you glycemic DKA versus starvation ketoacidosis.  Her lactic acid is normal at 0.9, I doubt mesenteric ischemia or any obstruction or emergent intra-abdominal abnormality.  Patient does have chronic abdominal pain.  Her total bilirubin is chronically elevated, I doubt acute cholecystitis.  She does not have any grave electrolyte abnormalities with potassium 4.1 and sodium of 142.  Lipase is normal at 13, doubt acute pancreatitis.  Patient will be started on D5 at insulin.  Discussed Hospital Medicine who agrees to admit the patient for further care.      Amount and/or Complexity of Data Reviewed  Labs: ordered.    Risk  Prescription drug management.  Decision regarding hospitalization.               ED Course as of 10/23/23 0810   Sun Oct 15, 2023   0743 Pt has a glucose of 27.  Will give d50 and stop insulin. Aaox3. Admitted to hospital medicine and nurse will call.  [JS]      ED Course User Index  [JS] Nura Grant MD                    Clinical Impression:   Final diagnoses:  [T73.0XXA, E87.29] Starvation ketoacidosis  [R11.2] Nausea and vomiting, unspecified vomiting type  [R10.30] Lower abdominal pain  [E87.29] Ketoacidosis        ED Disposition Condition    Admit Stable                Gurmeet Barrera MD  10/15/23 0604       Gurmeet Barrera MD  10/23/23 0845

## 2023-10-15 NOTE — AI DETERIORATION ALERT
Artificial Intelligence Notification  [address] [facility] [id number]    This documentation was triggered by an Artificial Intelligence Notification:    Admit Date: 10/15/2023   LOS: 0  Code Status: Full Code  : 1990  Age: 33 y.o.  Weight:   Wt Readings from Last 1 Encounters:   10/15/23 55.3 kg (121 lb 14.6 oz)        Sex: female  Bed: 222/222 A  MRN: [id number]  Attending Physician: Linda Cueto MD     Date of Alert: 10/15/2023  Time AI Alert Received: 1118            Vitals:    10/15/23 1114   BP:    Pulse:    Resp:    Temp: 97.6 °F (36.4 °C)     SpO2: 99 %          Patient Condition: Pt seen and evaluated at BS with RN present. Pt denies acute complaints. Glucose improved and last reading >150. VSS. Unclear why AI alert was triggered. Will cont with current tx plan and close monitoring.

## 2023-10-15 NOTE — H&P
[facility]  History & Physical    Patient Name: Tabby Howard  MRN: [id number]  Patient Class: IP- Inpatient  Admission Date: 10/15/2023  Attending Physician: Dr. Cueto  Primary Care Provider: Melony Kim NP         Patient information was obtained from patient, past medical records and ER records.     Subjective:     Principal Problem:Ketoacidosis    Chief Complaint:   Chief Complaint   Patient presents with    Abdominal Pain        HPI:  Patient is a 33-year-old female with ESRD on HD, history of DVT, gastroparesis, and type 2 diabetes who presents with abdominal pain. It is severe. It is associated with anorexia and N/V. She denies fever, chills, chest pain, SOB. She was discharged from [facility] on 10/14 after and admission for missed HD an abd pain. She was dialyzed and discharged home. US abd 2 days ago was negative for acute findings. She has recurrent abd pain and this is her 5th admission in 1 month. She initially states she was taking her insulin at home then states she did not take it. In the ED, bicarb 13, anion gap 25, glucose 180 and BHB 5.9, pH 7.23. Hospital medicine is consulted for admission for further work up and treatment.       Past Medical History:   Diagnosis Date    ESRD on hemodialysis 2022    Gastritis 2022    EGD was 22    Gastroparesis 2022    has not had Emptying study    Heart failure with preserved ejection fraction 2022    EF 55% on 3/22    History of supraventricular tachycardia     Hyperkalemia 2022    Hypertensive emergency 2022    Sickle cell trait 2022    Type 2 diabetes mellitus        Past Surgical History:   Procedure Laterality Date     SECTION      x 3    COLONOSCOPY      COLONOSCOPY N/A 2022    Procedure: COLONOSCOPY;  Surgeon: Jagdeep Cedeno MD;  Location: [facility] ENDO;  Service: Endoscopy;  Laterality: N/A;    ESOPHAGOGASTRODUODENOSCOPY N/A 10/18/2019    Procedure:  ESOPHAGOGASTRODUODENOSCOPY (EGD);  Surgeon: Gianluca Mendez MD;  Location: [facility] ENDO;  Service: Endoscopy;  Laterality: N/A;    ESOPHAGOGASTRODUODENOSCOPY N/A 08/24/2022    Procedure: EGD (ESOPHAGOGASTRODUODENOSCOPY);  Surgeon: Micky Paredes III, MD;  Location: [facility] ENDO;  Service: Endoscopy;  Laterality: N/A;    ESOPHAGOGASTRODUODENOSCOPY N/A 12/5/2022    Procedure: EGD (ESOPHAGOGASTRODUODENOSCOPY);  Surgeon: Marcelo Zhong MD;  Location: [facility] ENDO;  Service: Endoscopy;  Laterality: N/A;    INSERTION, CATHETER, TUNNELED N/A 6/17/2023    Procedure: Insertion,catheter,tunneled;  Surgeon: Carlos Thurman Jr., MD;  Location: [facility] OR;  Service: General;  Laterality: N/A;    LAPAROSCOPIC CHOLECYSTECTOMY N/A 07/30/2022    Procedure: CHOLECYSTECTOMY, LAPAROSCOPIC;  Surgeon: Grey Perez MD;  Location: [facility] OR;  Service: General;  Laterality: N/A;    PLACEMENT OF DUAL-LUMEN VASCULAR CATHETER Left 07/12/2022    Procedure: INSERTION-CATHETER-JOSEPH;  Surgeon: Dionte Gan MD;  Location: [facility] OR;  Service: General;  Laterality: Left;    PLACEMENT OF DUAL-LUMEN VASCULAR CATHETER Right 07/26/2022    Procedure: INSERTION-CATHETER-Hemosplit;  Surgeon: Dionte Gan MD;  Location: [facility] OR;  Service: General;  Laterality: Right;       Review of patient's allergies indicates:   Allergen Reactions    Penicillins Hives       Current Facility-Administered Medications on File Prior to Encounter   Medication    [DISCONTINUED] acetaminophen tablet 650 mg    [DISCONTINUED] amLODIPine tablet 5 mg    [DISCONTINUED] apixaban tablet 2.5 mg    [DISCONTINUED] apixaban tablet 5 mg    [DISCONTINUED] carvediloL tablet 25 mg    [DISCONTINUED] dextrose 50% injection 12.5 g    [DISCONTINUED] dextrose 50% injection 25 g    [DISCONTINUED] epoetin teresa injection 5,300 Units    [DISCONTINUED] FLUoxetine capsule 20 mg    [DISCONTINUED] gabapentin capsule 300 mg    [DISCONTINUED] gabapentin capsule 400 mg     [DISCONTINUED] glucagon (human recombinant) injection 1 mg    [DISCONTINUED] glucose chewable tablet 16 g    [DISCONTINUED] glucose chewable tablet 24 g    [DISCONTINUED] hydrALAZINE injection 20 mg    [DISCONTINUED] hydrALAZINE tablet 100 mg    [DISCONTINUED] HYDROcodone-acetaminophen 5-325 mg per tablet 1 tablet    [DISCONTINUED] insulin aspart U-100 pen 0-10 Units    [DISCONTINUED] insulin detemir U-100 (Levemir) pen 18 Units    [DISCONTINUED] isosorbide mononitrate 24 hr tablet 30 mg    [DISCONTINUED] levETIRAcetam tablet 500 mg    [DISCONTINUED] ondansetron injection 4 mg    [DISCONTINUED] pantoprazole EC tablet 40 mg    [DISCONTINUED] promethazine (PHENERGAN) 12.5 mg in dextrose 5 % (D5W) 50 mL IVPB    [DISCONTINUED] sodium chloride 0.9% flush 10 mL     Current Outpatient Medications on File Prior to Encounter   Medication Sig    amLODIPine (NORVASC) 5 MG tablet Take 1 tablet (5 mg total) by mouth once daily.    apixaban (ELIQUIS) 5 mg Tab Take 1 tablet (5 mg total) by mouth 2 (two) times daily.    blood sugar diagnostic (TRUE METRIX GLUCOSE TEST STRIP) Strp Use 1 strip to check blood glucose three times daily    blood-glucose meter (TRUE METRIX GLUCOSE METER) Misc Use to check blood glucose    carvediloL (COREG) 25 MG tablet Take 1 tablet (25 mg total) by mouth 2 (two) times daily.    cetirizine (ZYRTEC) 10 MG tablet Take 1 tablet every day by oral route. (Patient taking differently: Take 10 mg by mouth once daily.)    ciprofloxacin HCl (CILOXAN) 0.3 % ophthalmic solution instill 1 drop into the right eye 4 times a day for 30 days    FLUoxetine 20 MG capsule Take 1 capsule (20 mg total) by mouth once daily.    fluticasone propionate (FLONASE) 50 mcg/actuation nasal spray [place] 1 spray every day by intranasal route. (Patient taking differently: 1 spray by Each Nostril route Daily.)    gabapentin (NEURONTIN) 300 MG capsule Take 2 capsules twice a day by oral route for 90 days. (Patient  "taking differently: Take 600 mg by mouth 2 (two) times daily.)    hydrALAZINE (APRESOLINE) 100 MG tablet Take 1 tablet (100 mg total) by mouth 2 (two) times a day.    insulin aspart U-100 (NOVOLOG FLEXPEN U-100 INSULIN) 100 unit/mL (3 mL) InPn pen Inject 8 units 3 times a day by subcutaneous route before meals (Patient taking differently: Inject 8 Units into the skin 3 (three) times daily before meals.)    insulin detemir U-100, Levemir, (LEVEMIR FLEXTOUCH U100 INSULIN) 100 unit/mL (3 mL) InPn pen Inject 18 units every day by subcutaneous route in the evening    insulin detemir U-100, Levemir, (LEVEMIR FLEXTOUCH U100 INSULIN) 100 unit/mL (3 mL) InPn pen Inject 21 units every day by subcutaneous route in the evening for 90 days. (Patient taking differently: Inject 21 Units into the skin every evening.)    isosorbide mononitrate (IMDUR) 30 MG 24 hr tablet Take 1 tablet (30 mg total) by mouth once daily.    ketorolac 0.5% (ACULAR) 0.5 % Drop Instill 1 drop into the right eye 4 times a day for 30 days    lancets (TRUEPLUS LANCETS) 33 gauge Misc Use 1 to check blood glucose three times daily    lancets 33 gauge Misc Use to test twice daily    levETIRAcetam (KEPPRA) 500 MG Tab Take 1 tablet twice a day by oral route for 30 days. (Patient taking differently: Take 500 mg by mouth 2 (two) times daily.)    ondansetron (ZOFRAN-ODT) 4 MG TbDL Place 1 tablet (4 mg total) under the tongue every 8 (eight) hours.    pantoprazole (PROTONIX) 40 MG tablet Take 1 tablet (40 mg total) by mouth once daily.    pen needle, diabetic 31 gauge x 3/16" Ndle Use as directed to inject insulin 5 times daily    prednisoLONE acetate (PRED FORTE) 1 % DrpS Instill 1 drops into the right eye 4 times a day for 30 days    promethazine (PHENERGAN) 25 MG suppository Place 1 suppository (25 mg total) rectally every 6 (six) hours as needed for Nausea.    sucroferric oxyhydroxide (VELPHORO) 500 mg Chew Take 1 tablet 3 times a day (Patient " taking differently: Take 1 tablet by mouth 3 (three) times daily.)    timolol maleate 0.5% (TIMOPTIC) 0.5 % Drop Instill 1 drop into the right eye 2 times a day for 30 days    [DISCONTINUED] atenoloL (TENORMIN) 50 MG tablet Take 1 tablet (50 mg total) by mouth every other day.    [DISCONTINUED] omeprazole (PRILOSEC) 20 MG capsule Take 2 capsules (40 mg total) by mouth once daily. for 10 days     Family History       Problem Relation (Age of Onset)    Diabetes Mother, Father          Tobacco Use    Smoking status: Never    Smokeless tobacco: Never   Substance and Sexual Activity    Alcohol use: Not Currently    Drug use: No    Sexual activity: Not Currently     Partners: Male     Birth control/protection: I.U.D.     Review of Systems   Constitutional:  Negative for activity change, appetite change, chills, diaphoresis, fatigue, fever and unexpected weight change.   HENT:  Negative for congestion, ear pain, facial swelling, hearing loss, sore throat and trouble swallowing.    Eyes:  Negative for pain and discharge.   Respiratory:  Negative for cough, chest tightness, shortness of breath and wheezing.    Cardiovascular:  Negative for chest pain, palpitations and leg swelling.   Gastrointestinal:  Positive for abdominal pain, nausea and vomiting. Negative for blood in stool and diarrhea.   Endocrine: Negative for polydipsia, polyphagia and polyuria.   Genitourinary:  Negative for difficulty urinating, dysuria, flank pain, frequency and urgency.   Musculoskeletal:  Negative for arthralgias, back pain, joint swelling, neck pain and neck stiffness.   Skin:  Negative for rash and wound.   Allergic/Immunologic: Negative for environmental allergies and immunocompromised state.   Neurological:  Negative for dizziness, seizures, syncope, speech difficulty, weakness, light-headedness, numbness and headaches.   Hematological:  Negative for adenopathy.   Psychiatric/Behavioral:  Negative for sleep disturbance and suicidal  ideas. The patient is not nervous/anxious.    All other systems reviewed and are negative.    Objective:     Vital Signs (Most Recent):  Temp: 98 °F (36.7 °C) (10/15/23 0045)  Pulse: 84 (10/15/23 0353)  Resp: 18 (10/15/23 0451)  BP: (!) 161/92 (10/15/23 0333)  SpO2: 100 % (10/15/23 0353) Vital Signs (24h Range):  Temp:  [97.9 °F (36.6 °C)-98.3 °F (36.8 °C)] 98 °F (36.7 °C)  Pulse:  [77-84] 84  Resp:  [18-20] 18  SpO2:  [89 %-100 %] 100 %  BP: (128-187)/() 161/92     Weight: 55.3 kg (122 lb)  Body mass index is 22.31 kg/m².     Physical Exam  Vitals and nursing note reviewed.   Constitutional:       Appearance: Normal appearance. She is ill-appearing.   HENT:      Head: Normocephalic and atraumatic.      Nose: Nose normal.      Mouth/Throat:      Mouth: Mucous membranes are moist.      Pharynx: Oropharynx is clear.   Eyes:      Extraocular Movements: Extraocular movements intact.      Pupils: Pupils are equal, round, and reactive to light.   Cardiovascular:      Rate and Rhythm: Normal rate and regular rhythm.      Pulses: Normal pulses.   Pulmonary:      Effort: Pulmonary effort is normal.      Breath sounds: Normal breath sounds.   Abdominal:      General: Bowel sounds are normal.      Palpations: Abdomen is soft.      Tenderness: There is guarding.   Musculoskeletal:         General: Normal range of motion.      Cervical back: Normal range of motion and neck supple.   Skin:     General: Skin is warm and dry.      Capillary Refill: Capillary refill takes less than 2 seconds.   Neurological:      General: No focal deficit present.      Mental Status: She is alert and oriented to person, place, and time.   Psychiatric:      Comments: Pt is writhing around in bed               CRANIAL NERVES     CN III, IV, VI   Pupils are equal, round, and reactive to light.       Significant Labs: All pertinent labs within the past 24 hours have been reviewed.  CBC:   Recent Labs   Lab 10/13/23  0847 10/14/23  5573  10/15/23  0244   WBC 4.88 5.09 10.54   HGB 8.3* 7.5* 7.9*   HCT 24.6* 22.4* 24.0*   * 92* 131*     CMP:   Recent Labs   Lab 10/13/23  0847 10/14/23  0411 10/15/23  0244    137 142   K 5.3* 5.0 4.1   CL 99 101 104   CO2 24 25 13*   * 183* 180*   BUN 56* 34* 27*   CREATININE 7.3* 4.6* 3.7*   CALCIUM 9.2 8.4* 9.9   PROT 7.3 6.8 8.3   ALBUMIN 4.1 3.8 3.8   BILITOT 2.4* 3.3* 2.9*   ALKPHOS 401* 375* 477*   AST 99* 66* 53*   * 133* 151*   ANIONGAP 14 11 25*       Significant Imaging: I have reviewed all pertinent imaging results/findings within the past 24 hours.    US Abdomen Limited    Result Date: 10/14/2023  Examination: Limited abdominal sonogram CLINICAL HISTORY: Elevated bilirubin COMPARISON: CT scan the abdomen and pelvis with contrast 10/3/2023. FINDINGS: Liver maintains normal in size measuring 17.0 cm with a clavicular line with evidence of normal uniform echogenicity The gallbladder has been surgically resected. Common duct measures normal in caliber at 5 mm in widest dimension. Appropriate flow is established in the main portal vein and hepatic veins. Visualized pancreas is sonographically normal. The right kidney measures 1.0 cm stable with evidence of mildly increased echodensity pattern attributed towards long-standing medical renal disease. No evidence of intra-abdominal ascites. No significant pleural effusion pleural cavity. IMPRESSION: 1. No evidence of acute intra-abdominal findings. 2. Echogenic kidney attributed towards long-standing medical renal disease. 3. Status post cholecystectomy. Electronically signed by:  Deandre Vitale MD  10/14/2023 08:54 AM CDT Workstation: [id number]    CT Head Without Contrast    Result Date: 10/14/2023  CMS MANDATED QUALITY DATA - CT RADIATION  436 All CT scans at this facility utilize dose modulation, iterative reconstruction, and/or weight based dosing when appropriate to reduce radiation dose to as low as reasonably achievable. CT of THE  HEAD WITHOUT CONTRAST HISTORY: Worsening headache COMPARISON: 10/3/2023 Technical factors:   Spiral acquisition of the brain was generated at 3.0 mm thickness from the skull base to the skull vertex in helical fashion in the absence of intravenous contrast.  Additional coronal and sagittal reconstructed images were also included and reviewed. FINDINGS: There is exquisite differentiation between the gray and white matter. The substance of the brain is relatively well maintained without alteration the attenuation pattern that would reflect intraparenchymal hemorrhage nor mass lesion or acute infarct. There is no evidence of sub nor epidural collections. The ventricles are equal and symmetric. Calvarium appears intact. Stable. Mucoperiosteal thickening involves bilateral maxillary sinuses posteriorly as well as patchy inflammatory density changes throughout the bilateral ethmoid groupings that remains equal in capacity as compared to previous. IMPRESSION: 1. No evidence of acute intracranial findings. 2. Bilateral maxillary sinus disease and marginal sinus disease within the bilateral ethmoid sinuses. Electronically signed by:  Deandre Vitale MD  10/14/2023 07:38 AM CDT Workstation: [id number]    X-Ray Chest AP Portable    Result Date: 10/13/2023  HISTORY: Congestive heart failure COMPARISON:Comparison is made with patient's previous conventional radiograph is of the chest of 10/4/2023 FINDINGS:Right-sided Gomez catheter has its tip projecting just below the level of the cavoatrial junction. ET tube and NG tube have been removed in the interval. The heart is at the upper edge normal. No evidence of florid pulmonary edema or vascular congestion. No pulmonary infiltrate significant pleural effusion. Tracheal column is midline without evidence of significant shift. Osseous structures reveal no significant finding IMPRESSION:Post removal the patient's ET tube and NG tube. Right-sided dialysis catheter remains in optimal  position. No appreciable findings of pulmonary edema. Electronically signed by:  Deandre Vitale MD  10/13/2023 09:49 AM CDT Workstation: [id number]      Assessment/Plan:     * Ketoacidosis  Admit to stepdown  Glucose is 180  Will start D5 1/2NS @75mL/hr and insulin gtt   Likely multifactorial and starvation playing a role   BMPq4h  POCT glucose q1H      GERD (gastroesophageal reflux disease)  Continue PPI    Chronic congestive heart failure with left ventricular diastolic dysfunction  Patient is identified as having Diastolic (HFpEF) heart failure that is Chronic. CHF is currently controlled. Latest ECHO performed and demonstrates- Results for orders placed during the hospital encounter of 09/10/23    Echo    Interpretation Summary    Left Ventricle: The left ventricle is normal in size. Mildly increased ventricular mass. Mildly increased wall thickness. Normal wall motion. There is low normal systolic function with a visually estimated ejection fraction of 50 - 55%. There is normal diastolic function.    Right Ventricle: Normal right ventricular cavity size. Wall thickness is normal. Right ventricle wall motion  is normal. Systolic function is normal.    IVC/SVC: Normal venous pressure at 3 mmHg.    Pericardium: There is a small circumferential effusion. Pericardial effusion is echolucent. No indication of cardiac tamponade. Evidence includes no chamber collapse.  . Continue Nitrate/Vasodilator and monitor clinical status closely. Monitor on telemetry. Patient is off CHF pathway.  Monitor strict Is&Os and daily weights.  Place on fluid restriction of no fluid restriction while on gtt. Cardiology has not been any consulted. Continue to stress to patient importance of self efficacy and  on diet for CHF. Last BNP reviewed- and noted below   Recent Labs   Lab 10/13/23  0851   BNP 3,757*   .    Type 2 diabetes mellitus with hyperglycemia, with long-term current use of insulin, previous HbA1c 5.8%  Patient's  "FSGs are uncontrolled due to hyperglycemia on current medication regimen.  Last A1c reviewed-   Lab Results   Component Value Date    HGBA1C 5.8 08/01/2023     Most recent fingerstick glucose reviewed- No results for input(s): "POCTGLUCOSE" in the last 24 hours.  Current correctional scale  gtt  Maintain anti-hyperglycemic dose as follows-   Antihyperglycemics (From admission, onward)    Start     Stop Route Frequency Ordered    10/15/23 0430  insulin regular 1 Units/mL in sodium chloride 0.9% 100 mL infusion        Question Answer Comment   Insulin Rate Adjustment (DO NOT MODIFY ANSWER) \\[email or web address] ADJUSTMENT DKA version [email or web address]    Initial dose (DO NOT CHANGE): 0.1 units/kg/hr        -- IV Continuous 10/15/23 0418        Hold Oral hypoglycemics while patient is in the hospital.        Thrombocytopenia  Chronic monitor cbc      ESRD (end stage renal disease)  Consult nephrology for HD management  Daily wt/accurate I&O      Seizure disorder  Continue keppra        VTE Risk Mitigation (From admission, onward)         Ordered     apixaban tablet 5 mg  2 times daily         10/15/23 0449     IP VTE HIGH RISK PATIENT  Once         10/15/23 0449     Place sequential compression device  Until discontinued         10/15/23 0449     Place sequential compression device  Until discontinued         10/15/23 0418                           Esperanza Guy NP  Department of Hospital Medicine  [facility]   "

## 2023-10-16 LAB
ALBUMIN SERPL BCP-MCNC: 2.8 G/DL (ref 3.5–5.2)
ALP SERPL-CCNC: 342 U/L (ref 55–135)
ALT SERPL W/O P-5'-P-CCNC: 84 U/L (ref 10–44)
ANION GAP SERPL CALC-SCNC: 13 MMOL/L (ref 8–16)
ANION GAP SERPL CALC-SCNC: 14 MMOL/L (ref 8–16)
AST SERPL-CCNC: 29 U/L (ref 10–40)
BASOPHILS # BLD AUTO: 0.04 K/UL (ref 0–0.2)
BASOPHILS NFR BLD: 0.7 % (ref 0–1.9)
BILIRUB SERPL-MCNC: 1.3 MG/DL (ref 0.1–1)
BUN SERPL-MCNC: 34 MG/DL (ref 6–20)
BUN SERPL-MCNC: 35 MG/DL (ref 6–20)
BUN SERPL-MCNC: 36 MG/DL (ref 6–20)
BUN SERPL-MCNC: 36 MG/DL (ref 6–20)
CALCIUM SERPL-MCNC: 8.2 MG/DL (ref 8.7–10.5)
CALCIUM SERPL-MCNC: 8.2 MG/DL (ref 8.7–10.5)
CALCIUM SERPL-MCNC: 8.3 MG/DL (ref 8.7–10.5)
CALCIUM SERPL-MCNC: 8.4 MG/DL (ref 8.7–10.5)
CALCIUM SERPL-MCNC: 8.5 MG/DL (ref 8.7–10.5)
CHLORIDE SERPL-SCNC: 106 MMOL/L (ref 95–110)
CHLORIDE SERPL-SCNC: 107 MMOL/L (ref 95–110)
CHLORIDE SERPL-SCNC: 107 MMOL/L (ref 95–110)
CHLORIDE SERPL-SCNC: 108 MMOL/L (ref 95–110)
CHLORIDE SERPL-SCNC: 110 MMOL/L (ref 95–110)
CO2 SERPL-SCNC: 15 MMOL/L (ref 23–29)
CO2 SERPL-SCNC: 16 MMOL/L (ref 23–29)
CO2 SERPL-SCNC: 16 MMOL/L (ref 23–29)
CO2 SERPL-SCNC: 18 MMOL/L (ref 23–29)
CREAT SERPL-MCNC: 5.4 MG/DL (ref 0.5–1.4)
CREAT SERPL-MCNC: 5.7 MG/DL (ref 0.5–1.4)
CREAT SERPL-MCNC: 5.7 MG/DL (ref 0.5–1.4)
CREAT SERPL-MCNC: 5.8 MG/DL (ref 0.5–1.4)
CREAT SERPL-MCNC: 5.8 MG/DL (ref 0.5–1.4)
CREAT SERPL-MCNC: 5.9 MG/DL (ref 0.5–1.4)
CREAT SERPL-MCNC: 6.1 MG/DL (ref 0.5–1.4)
DIFFERENTIAL METHOD: ABNORMAL
EOSINOPHIL # BLD AUTO: 0.2 K/UL (ref 0–0.5)
EOSINOPHIL NFR BLD: 3.5 % (ref 0–8)
ERYTHROCYTE [DISTWIDTH] IN BLOOD BY AUTOMATED COUNT: 18.4 % (ref 11.5–14.5)
EST. GFR  (NO RACE VARIABLE): 10 ML/MIN/1.73 M^2
EST. GFR  (NO RACE VARIABLE): 9 ML/MIN/1.73 M^2
GLUCOSE SERPL-MCNC: 103 MG/DL (ref 70–110)
GLUCOSE SERPL-MCNC: 112 MG/DL (ref 70–110)
GLUCOSE SERPL-MCNC: 112 MG/DL (ref 70–110)
GLUCOSE SERPL-MCNC: 124 MG/DL (ref 70–110)
GLUCOSE SERPL-MCNC: 129 MG/DL (ref 70–110)
GLUCOSE SERPL-MCNC: 216 MG/DL (ref 70–110)
GLUCOSE SERPL-MCNC: 53 MG/DL (ref 70–110)
HCT VFR BLD AUTO: 23 % (ref 37–48.5)
HGB BLD-MCNC: 7.6 G/DL (ref 12–16)
IMM GRANULOCYTES # BLD AUTO: 0.05 K/UL (ref 0–0.04)
IMM GRANULOCYTES NFR BLD AUTO: 0.8 % (ref 0–0.5)
LYMPHOCYTES # BLD AUTO: 1.4 K/UL (ref 1–4.8)
LYMPHOCYTES NFR BLD: 23.4 % (ref 18–48)
MAGNESIUM SERPL-MCNC: 2.2 MG/DL (ref 1.6–2.6)
MCH RBC QN AUTO: 29.6 PG (ref 27–31)
MCHC RBC AUTO-ENTMCNC: 33 G/DL (ref 32–36)
MCV RBC AUTO: 90 FL (ref 82–98)
MONOCYTES # BLD AUTO: 0.7 K/UL (ref 0.3–1)
MONOCYTES NFR BLD: 11.2 % (ref 4–15)
NEUTROPHILS # BLD AUTO: 3.7 K/UL (ref 1.8–7.7)
NEUTROPHILS NFR BLD: 60.4 % (ref 38–73)
NRBC BLD-RTO: 2 /100 WBC
PHOSPHATE SERPL-MCNC: 4.5 MG/DL (ref 2.7–4.5)
PLATELET # BLD AUTO: 140 K/UL (ref 150–450)
PMV BLD AUTO: 9.9 FL (ref 9.2–12.9)
POCT GLUCOSE: 101 MG/DL (ref 70–110)
POCT GLUCOSE: 105 MG/DL (ref 70–110)
POCT GLUCOSE: 106 MG/DL (ref 70–110)
POCT GLUCOSE: 109 MG/DL (ref 70–110)
POCT GLUCOSE: 113 MG/DL (ref 70–110)
POCT GLUCOSE: 115 MG/DL (ref 70–110)
POCT GLUCOSE: 119 MG/DL (ref 70–110)
POCT GLUCOSE: 121 MG/DL (ref 70–110)
POCT GLUCOSE: 121 MG/DL (ref 70–110)
POCT GLUCOSE: 124 MG/DL (ref 70–110)
POCT GLUCOSE: 125 MG/DL (ref 70–110)
POCT GLUCOSE: 129 MG/DL (ref 70–110)
POCT GLUCOSE: 130 MG/DL (ref 70–110)
POCT GLUCOSE: 133 MG/DL (ref 70–110)
POCT GLUCOSE: 142 MG/DL (ref 70–110)
POCT GLUCOSE: 162 MG/DL (ref 70–110)
POCT GLUCOSE: 205 MG/DL (ref 70–110)
POCT GLUCOSE: 209 MG/DL (ref 70–110)
POCT GLUCOSE: 233 MG/DL (ref 70–110)
POCT GLUCOSE: 247 MG/DL (ref 70–110)
POCT GLUCOSE: 51 MG/DL (ref 70–110)
POCT GLUCOSE: 51 MG/DL (ref 70–110)
POCT GLUCOSE: 69 MG/DL (ref 70–110)
POCT GLUCOSE: 93 MG/DL (ref 70–110)
POCT GLUCOSE: 95 MG/DL (ref 70–110)
POCT GLUCOSE: 96 MG/DL (ref 70–110)
POCT GLUCOSE: 97 MG/DL (ref 70–110)
POTASSIUM SERPL-SCNC: 4.1 MMOL/L (ref 3.5–5.1)
POTASSIUM SERPL-SCNC: 4.1 MMOL/L (ref 3.5–5.1)
POTASSIUM SERPL-SCNC: 4.2 MMOL/L (ref 3.5–5.1)
POTASSIUM SERPL-SCNC: 4.2 MMOL/L (ref 3.5–5.1)
POTASSIUM SERPL-SCNC: 4.4 MMOL/L (ref 3.5–5.1)
POTASSIUM SERPL-SCNC: 4.5 MMOL/L (ref 3.5–5.1)
POTASSIUM SERPL-SCNC: 4.7 MMOL/L (ref 3.5–5.1)
PROT SERPL-MCNC: 6.3 G/DL (ref 6–8.4)
RBC # BLD AUTO: 2.57 M/UL (ref 4–5.4)
SODIUM SERPL-SCNC: 135 MMOL/L (ref 136–145)
SODIUM SERPL-SCNC: 136 MMOL/L (ref 136–145)
SODIUM SERPL-SCNC: 137 MMOL/L (ref 136–145)
SODIUM SERPL-SCNC: 140 MMOL/L (ref 136–145)
SODIUM SERPL-SCNC: 142 MMOL/L (ref 136–145)
WBC # BLD AUTO: 6.06 K/UL (ref 3.9–12.7)

## 2023-10-16 PROCEDURE — 63600175 PHARM REV CODE 636 W HCPCS: Performed by: NURSE PRACTITIONER

## 2023-10-16 PROCEDURE — 80048 BASIC METABOLIC PNL TOTAL CA: CPT | Performed by: NURSE PRACTITIONER

## 2023-10-16 PROCEDURE — 36415 COLL VENOUS BLD VENIPUNCTURE: CPT | Performed by: NURSE PRACTITIONER

## 2023-10-16 PROCEDURE — G0378 HOSPITAL OBSERVATION PER HR: HCPCS

## 2023-10-16 PROCEDURE — 94761 N-INVAS EAR/PLS OXIMETRY MLT: CPT

## 2023-10-16 PROCEDURE — 80053 COMPREHEN METABOLIC PANEL: CPT | Performed by: NURSE PRACTITIONER

## 2023-10-16 PROCEDURE — 80048 BASIC METABOLIC PNL TOTAL CA: CPT | Mod: 91 | Performed by: NURSE PRACTITIONER

## 2023-10-16 PROCEDURE — 85025 COMPLETE CBC W/AUTO DIFF WBC: CPT | Performed by: NURSE PRACTITIONER

## 2023-10-16 PROCEDURE — 27000221 HC OXYGEN, UP TO 24 HOURS

## 2023-10-16 PROCEDURE — 25000003 PHARM REV CODE 250: Performed by: NURSE PRACTITIONER

## 2023-10-16 PROCEDURE — 25000003 PHARM REV CODE 250: Performed by: HOSPITALIST

## 2023-10-16 PROCEDURE — S5010 5% DEXTROSE AND 0.45% SALINE: HCPCS | Performed by: NURSE PRACTITIONER

## 2023-10-16 PROCEDURE — 20600001 HC STEP DOWN PRIVATE ROOM

## 2023-10-16 PROCEDURE — 99900035 HC TECH TIME PER 15 MIN (STAT)

## 2023-10-16 PROCEDURE — 84100 ASSAY OF PHOSPHORUS: CPT | Performed by: NURSE PRACTITIONER

## 2023-10-16 PROCEDURE — S5010 5% DEXTROSE AND 0.45% SALINE: HCPCS | Performed by: HOSPITALIST

## 2023-10-16 PROCEDURE — 83735 ASSAY OF MAGNESIUM: CPT | Performed by: NURSE PRACTITIONER

## 2023-10-16 RX ADMIN — PANTOPRAZOLE SODIUM 40 MG: 40 TABLET, DELAYED RELEASE ORAL at 09:10

## 2023-10-16 RX ADMIN — LEVETIRACETAM 500 MG: 500 TABLET, FILM COATED ORAL at 09:10

## 2023-10-16 RX ADMIN — GABAPENTIN 600 MG: 300 CAPSULE ORAL at 09:10

## 2023-10-16 RX ADMIN — CETIRIZINE HYDROCHLORIDE 10 MG: 10 TABLET, FILM COATED ORAL at 09:10

## 2023-10-16 RX ADMIN — PREDNISOLONE ACETATE 1 DROP: 10 SUSPENSION/ DROPS OPHTHALMIC at 11:10

## 2023-10-16 RX ADMIN — APIXABAN 5 MG: 2.5 TABLET, FILM COATED ORAL at 09:10

## 2023-10-16 RX ADMIN — KETOROLAC TROMETHAMINE 1 DROP: 5 SOLUTION/ DROPS OPHTHALMIC at 09:10

## 2023-10-16 RX ADMIN — MORPHINE SULFATE 2 MG: 2 INJECTION, SOLUTION INTRAMUSCULAR; INTRAVENOUS at 09:10

## 2023-10-16 RX ADMIN — KETOROLAC TROMETHAMINE 1 DROP: 5 SOLUTION/ DROPS OPHTHALMIC at 03:10

## 2023-10-16 RX ADMIN — TIMOLOL MALEATE 1 DROP: 5 SOLUTION/ DROPS OPHTHALMIC at 09:10

## 2023-10-16 RX ADMIN — FLUOXETINE 20 MG: 20 CAPSULE ORAL at 09:10

## 2023-10-16 RX ADMIN — MORPHINE SULFATE 2 MG: 2 INJECTION, SOLUTION INTRAMUSCULAR; INTRAVENOUS at 05:10

## 2023-10-16 RX ADMIN — CIPROFLOXACIN HYDROCHLORIDE 0.5 INCH: 3 OINTMENT OPHTHALMIC at 10:10

## 2023-10-16 RX ADMIN — ISOSORBIDE MONONITRATE 30 MG: 30 TABLET, EXTENDED RELEASE ORAL at 09:10

## 2023-10-16 RX ADMIN — PREDNISOLONE ACETATE 1 DROP: 10 SUSPENSION/ DROPS OPHTHALMIC at 02:10

## 2023-10-16 RX ADMIN — CIPROFLOXACIN HYDROCHLORIDE 0.5 INCH: 3 OINTMENT OPHTHALMIC at 09:10

## 2023-10-16 RX ADMIN — PREDNISOLONE ACETATE 1 DROP: 10 SUSPENSION/ DROPS OPHTHALMIC at 05:10

## 2023-10-16 RX ADMIN — MORPHINE SULFATE 2 MG: 2 INJECTION, SOLUTION INTRAMUSCULAR; INTRAVENOUS at 11:10

## 2023-10-16 RX ADMIN — PREDNISOLONE ACETATE 1 DROP: 10 SUSPENSION/ DROPS OPHTHALMIC at 08:10

## 2023-10-16 RX ADMIN — DEXTROSE AND SODIUM CHLORIDE: 5; 450 INJECTION, SOLUTION INTRAVENOUS at 11:10

## 2023-10-16 RX ADMIN — PREDNISOLONE ACETATE 1 DROP: 10 SUSPENSION/ DROPS OPHTHALMIC at 12:10

## 2023-10-16 RX ADMIN — HYDRALAZINE HYDROCHLORIDE 100 MG: 25 TABLET, FILM COATED ORAL at 09:10

## 2023-10-16 RX ADMIN — CIPROFLOXACIN HYDROCHLORIDE 0.5 INCH: 3 OINTMENT OPHTHALMIC at 05:10

## 2023-10-16 RX ADMIN — MUPIROCIN: 20 OINTMENT TOPICAL at 09:10

## 2023-10-16 RX ADMIN — CARVEDILOL 25 MG: 25 TABLET, FILM COATED ORAL at 09:10

## 2023-10-16 RX ADMIN — DEXTROSE MONOHYDRATE: 100 INJECTION, SOLUTION INTRAVENOUS at 08:10

## 2023-10-16 RX ADMIN — DEXTROSE AND SODIUM CHLORIDE: 5; 450 INJECTION, SOLUTION INTRAVENOUS at 03:10

## 2023-10-16 RX ADMIN — AMLODIPINE BESYLATE 5 MG: 5 TABLET ORAL at 09:10

## 2023-10-16 RX ADMIN — MORPHINE SULFATE 2 MG: 2 INJECTION, SOLUTION INTRAMUSCULAR; INTRAVENOUS at 03:10

## 2023-10-16 NOTE — SUBJECTIVE & OBJECTIVE
Interval History:  Patient seen and examined.  Remains on insulin drip.  Complains of headache this morning    Review of Systems   Respiratory:  Negative for shortness of breath.    Gastrointestinal:  Negative for abdominal pain.   Neurological:  Positive for headaches.     Objective:     Vital Signs (Most Recent):  Temp: 97.5 °F (36.4 °C) (10/16/23 0822)  Pulse: 65 (10/16/23 0837)  Resp: 14 (10/16/23 0947)  BP: 117/74 (10/16/23 0600)  SpO2: 100 % (10/16/23 0837) Vital Signs (24h Range):  Temp:  [97.5 °F (36.4 °C)-98.5 °F (36.9 °C)] 97.5 °F (36.4 °C)  Pulse:  [65-86] 65  Resp:  [8-24] 14  SpO2:  [94 %-100 %] 100 %  BP: ()/(59-93) 117/74     Weight: 55.3 kg (121 lb 14.6 oz)  Body mass index is 22.29 kg/m².    Intake/Output Summary (Last 24 hours) at 10/16/2023 1035  Last data filed at 10/16/2023 0545  Gross per 24 hour   Intake 1090 ml   Output --   Net 1090 ml         Physical Exam  Constitutional:       General: She is not in acute distress.     Appearance: She is well-developed. She is ill-appearing.   HENT:      Head: Normocephalic and atraumatic.   Eyes:      Pupils: Pupils are equal, round, and reactive to light.   Cardiovascular:      Rate and Rhythm: Normal rate and regular rhythm.      Heart sounds: No murmur heard.  Pulmonary:      Effort: Pulmonary effort is normal. No respiratory distress.      Breath sounds: Normal breath sounds. No wheezing or rales.   Abdominal:      General: Bowel sounds are normal. There is no distension.      Palpations: Abdomen is soft.      Tenderness: There is no abdominal tenderness.   Musculoskeletal:         General: Normal range of motion.   Skin:     General: Skin is warm and dry.      Findings: No rash.   Neurological:      Mental Status: She is alert and oriented to person, place, and time.      Cranial Nerves: No cranial nerve deficit.   Psychiatric:         Behavior: Behavior normal.             Significant Labs: All pertinent labs within the past 24 hours have  been reviewed.    Significant Imaging: I have reviewed all pertinent imaging results/findings within the past 24 hours.

## 2023-10-16 NOTE — PLAN OF CARE
[facility] - Med/Surg  Initial Discharge Assessment       Primary Care Provider: Melony Kim NP    Admission Diagnosis: Ketoacidosis [E87.29]    Admission Date: 10/15/2023  Expected Discharge Date: 10/19/2023    Transition of Care Barriers: None    Payor: MEDICAID / Plan: HEALTHY BLUE [place] / Product Type: Managed Medicaid /     Extended Emergency Contact Information  Primary Emergency Contact: Tanya Howard  Address: [address] [place] [address] [address]           [place] [address] [facility]  Home Phone: [phone]  Mobile Phone: [phone]  Relation: Step parent  Preferred language: English   needed? No  Secondary Emergency Contact: Garcia Howard  Address: [address]           [place] [address]  Mobile Phone: [phone]  Relation: Father  Preferred language: English   needed? No    Discharge Plan A: Home with family  Discharge Plan B: Home with family      [facility]  [address]  [facility] [address]  Phone: [phone] Fax: [phone]    DC assessment completed, information on facesheet verified. Lives at listed address with parents who will drive her home. PCP is Melony Kim NP. Pharm is [facility]. Denies coumadin and [facility] SANTA @ [facility]. DME is listed. Family assists with ADLs and drives her to appts. Insurance verified. Recent admission 2 days ago. Denies POA. CM following for DC planning.       Initial Assessment (most recent)       Adult Discharge Assessment - 10/16/23 1357          Discharge Assessment    Assessment Type Discharge Planning Assessment     Confirmed/corrected address, phone number and insurance Yes     Confirmed Demographics Correct on Facesheet     Source of Information patient     Communicated TATIANA with patient/caregiver Yes     People in Home parent(s)     Facility Arrived From: home     Do you expect to return to your current living situation? Yes     Do you have help at home or someone to  help you manage your care at home? Yes     Who are your caregiver(s) and their phone number(s)? parents     Prior to hospitilization cognitive status: Alert/Oriented     Current cognitive status: Alert/Oriented     Equipment Currently Used at Home walker, rolling;glucometer     Readmission within 30 days? Yes     Patient currently being followed by outpatient case management? No     Do you currently have service(s) that help you manage your care at home? No     Do you take prescription medications? Yes     Do you have prescription coverage? Yes     Do you have any problems affording any of your prescribed medications? No     Is the patient taking medications as prescribed? yes     Who is going to help you get home at discharge? parents     How do you get to doctors appointments? family or friend will provide     Are you on dialysis? Yes     Dialysis Name and Scheduled days [facility]     Do you take coumadin? No     DME Needed Upon Discharge  none     Discharge Plan discussed with: Patient     Transition of Care Barriers None     Discharge Plan A Home with family     Discharge Plan B Home with family

## 2023-10-16 NOTE — CONSULTS
Consult Note  Nephrology    Consult Requested By: Linda Cueto MD    Reason for Consult:   ESRD, dependent on dialysis    SUBJECTIVE:     History of Present Illness:   HPI patient is a 33-year-old woman with a history of end-stage renal disease on hemodialysis, gastroparesis, chronic abdominal pain, heart failure with preserved ejection fracture and type 2 diabetes who presented to emergency department early this am complaining of lower abdominal cramping pain radiating to her back since yesterday around noon.  She is had no associated fever.  Of note she was discharged from [facility] around 4:00 p.m. yesterday.  She had been admitted for abdominal pain and right eye pain and was evaluated by ophthalmology.  She received hemodialysis in the hospital yesterday.    She is now admitted to [facility], on insulin gtt.  Nephrology is consulted for dialysis orders.    10/16  c/o headache.  No sob or cp.  Afebrile. VSS    Assessment/plan:    Assessment:  ESRD on HD TTS  HTN  Hyperkalemia  SHPT  Anemia of CKD     Plan:     - HD TTS, PRN tx if clearance need arises.  Dose all medications for dialysis  - continue home BP meds  - UF 2-4L with hd  - renal diet, 1.5L fluid restriction  - adjust dialysate  - Phosphorus level at goal.  Can resume binders at d/c.  Her outpatient binders aren't on formulary here.   Low phos diet when diet resumed.    - ordered SHELLEY with HD    Past Medical History:   Diagnosis Date    ESRD on hemodialysis 2022    Gastritis 2022    EGD was 21    Gastroparesis 2022    has not had Emptying study    Heart failure with preserved ejection fraction 2022    EF 55% on 3/22    History of supraventricular tachycardia     Hyperkalemia 2022    Hypertensive emergency 2022    Sickle cell trait 2022    Type 2 diabetes mellitus      Past Surgical History:   Procedure Laterality Date     SECTION      x 3    COLONOSCOPY      COLONOSCOPY N/A 2022     Procedure: COLONOSCOPY;  Surgeon: Jagdeep Cedeno MD;  Location: [facility];  Service: Endoscopy;  Laterality: N/A;    ESOPHAGOGASTRODUODENOSCOPY N/A 10/18/2019    Procedure: ESOPHAGOGASTRODUODENOSCOPY (EGD);  Surgeon: Gianluca Mendez MD;  Location: [facility] ENDO;  Service: Endoscopy;  Laterality: N/A;    ESOPHAGOGASTRODUODENOSCOPY N/A 08/24/2022    Procedure: EGD (ESOPHAGOGASTRODUODENOSCOPY);  Surgeon: Micky Paredes III, MD;  Location: [facility];  Service: Endoscopy;  Laterality: N/A;    ESOPHAGOGASTRODUODENOSCOPY N/A 12/5/2022    Procedure: EGD (ESOPHAGOGASTRODUODENOSCOPY);  Surgeon: Marcelo Zhong MD;  Location: [facility];  Service: Endoscopy;  Laterality: N/A;    INSERTION, CATHETER, TUNNELED N/A 6/17/2023    Procedure: Insertion,catheter,tunneled;  Surgeon: Carlos Thurman Jr., MD;  Location: [facility] OR;  Service: General;  Laterality: N/A;    LAPAROSCOPIC CHOLECYSTECTOMY N/A 07/30/2022    Procedure: CHOLECYSTECTOMY, LAPAROSCOPIC;  Surgeon: Grey Perez MD;  Location: [facility] OR;  Service: General;  Laterality: N/A;    PLACEMENT OF DUAL-LUMEN VASCULAR CATHETER Left 07/12/2022    Procedure: INSERTION-CATHETER-JOSEPH;  Surgeon: Dionte Gan MD;  Location: [facility] OR;  Service: General;  Laterality: Left;    PLACEMENT OF DUAL-LUMEN VASCULAR CATHETER Right 07/26/2022    Procedure: INSERTION-CATHETER-Hemosplit;  Surgeon: Dionte Gan MD;  Location: [facility] OR;  Service: General;  Laterality: Right;     Family History   Problem Relation Age of Onset    Diabetes Mother     Diabetes Father      Social History     Tobacco Use    Smoking status: Never    Smokeless tobacco: Never   Substance Use Topics    Alcohol use: Not Currently    Drug use: No       Review of patient's allergies indicates:   Allergen Reactions    Penicillins Hives        Review of Systems:  Sleeping soundly    OBJECTIVE:     Vital Signs Range (Last 24H):  Temp:  [97.5 °F (36.4 °C)-98.5 °F (36.9 °C)]   Pulse:  [65-86]   Resp:  [8-24]   BP:  ()/(59-93)   SpO2:  [94 %-100 %]     Physical Exam:  General- NAD noted  HEENT- WNL  Neck- supple  CV- Regular rate and rhythm  Resp- No increased WOB  GI- Non tender/non-distended  Extrem- No cyanosis, clubbing, edema.  Derm- skin w/d  Neuro-  No flap.     Body mass index is 22.29 kg/m².    Laboratory:  CBC:   Recent Labs   Lab 10/16/23  0448   WBC 6.06   RBC 2.57*   HGB 7.6*   HCT 23.0*   *   MCV 90   MCH 29.6   MCHC 33.0       CMP:   Recent Labs   Lab 10/16/23  0448 10/16/23  0800   *  112* 53*   CALCIUM 8.4*  8.4* 8.5*   ALBUMIN 2.8*  --    PROT 6.3  --      140 142   K 4.2  4.2 4.1   CO2 18*  18* 18*     108 110   BUN 36*  36* 34*   CREATININE 5.7*  5.7* 5.8*   ALKPHOS 342*  --    ALT 84*  --    AST 29  --    BILITOT 1.3*  --          Diagnostic Results:  Labs: Reviewed      ASSESSMENT/PLAN:     Active Hospital Problems    Diagnosis  POA    *Ketoacidosis [E87.29]  Yes    GERD (gastroesophageal reflux disease) [K21.9]  Yes    Chronic congestive heart failure with left ventricular diastolic dysfunction [I50.32]  Yes    Type 2 diabetes mellitus with hyperglycemia, with long-term current use of insulin, previous HbA1c 5.8% [E11.65, Z79.4]  Not Applicable    Thrombocytopenia [D69.6]  Yes     Chronic    ESRD (end stage renal disease) [N18.6]  Yes    Seizure disorder [G40.909]  Yes     Chronic      Resolved Hospital Problems   No resolved problems to display.     Imaging Results    None       Patient care was time spent personally by me on the following activities:   Obtaining a history  Examination of patient.  Providing medical care at the patients bedside.  Developing a treatment plan with patient or surrogate and bedside caregivers  Ordering and reviewing laboratory studies, radiographic studies, pulse oximetry.  Ordering and performing treatments and interventions.  Evaluation of patient's response to treatment.  Discussions with consultants while on the unit and  immediately available to the patient.  Re-evaluation of the patient's condition.  Documentation in the medical record.       Thank you for allowing us to participate in the care of your patient. We will follow the patient and provide recommendations as needed.      Time spent seeing patient( greater than 1/2 spent in direct contact) :     Hugh Figueroa MD  Nephrology  [facility] [place]  [phone]

## 2023-10-16 NOTE — CARE UPDATE
10/16/23 0837   Patient Assessment/Suction   Level of Consciousness (AVPU) responds to voice   Respiratory Effort Unlabored   Expansion/Accessory Muscles/Retractions no use of accessory muscles;no retractions;expansion symmetric   All Lung Fields Breath Sounds Anterior:;Lateral:;clear   Rhythm/Pattern, Respiratory unlabored;pattern regular;depth regular;no shortness of breath reported   PRE-TX-O2   Device (Oxygen Therapy) nasal cannula  (placed on standby)   $ Is the patient on Low Flow Oxygen? Yes   Flow (L/min) 2   SpO2 100 %   Pulse Oximetry Type Continuous   $ Pulse Oximetry - Multiple Charge Pulse Oximetry - Multiple   Pulse 65   Resp 16   Aerosol Therapy   $ Aerosol Therapy Charges PRN treatment not required   Respiratory Treatment Status (SVN) PRN treatment not required

## 2023-10-16 NOTE — PLAN OF CARE
Problem: Adult Inpatient Plan of Care  Goal: Plan of Care Review  Outcome: Ongoing, Progressing  Goal: Patient-Specific Goal (Individualized)  Outcome: Ongoing, Progressing  Goal: Absence of Hospital-Acquired Illness or Injury  Outcome: Ongoing, Progressing  Goal: Optimal Comfort and Wellbeing  Outcome: Ongoing, Progressing  Goal: Readiness for Transition of Care  Outcome: Ongoing, Progressing     Problem: Diabetes Comorbidity  Goal: Blood Glucose Level Within Targeted Range  Outcome: Ongoing, Progressing     Problem: Infection  Goal: Absence of Infection Signs and Symptoms  Outcome: Ongoing, Progressing     Problem: Impaired Wound Healing  Goal: Optimal Wound Healing  Outcome: Ongoing, Progressing     Problem: Skin Injury Risk Increased  Goal: Skin Health and Integrity  Outcome: Ongoing, Progressing     Problem: Device-Related Complication Risk (Hemodialysis)  Goal: Safe, Effective Therapy Delivery  Outcome: Ongoing, Progressing     Problem: Hemodynamic Instability (Hemodialysis)  Goal: Effective Tissue Perfusion  Outcome: Ongoing, Progressing     Problem: Infection (Hemodialysis)  Goal: Absence of Infection Signs and Symptoms  Outcome: Ongoing, Progressing    Plan of care reviewed with patient. Insulin infusion continuing with POCT glucose checks hourly. Safety maintained with bed in lowest position, wheels locked. Side rail up x2, and call button in reach. Patient instructed to call for any assistance.

## 2023-10-16 NOTE — EICU
Intervention Initiated From:  COR / EICU    Roxann intervened regarding:  Rounding (Video assessment)    Nurse Notified:  No    Doctor Notified:  No    Comments: Asleep in bed. Insulin drip infusing. VSS. No needs at this time.

## 2023-10-16 NOTE — ASSESSMENT & PLAN NOTE
Admit to stepdown   D5 1/2NS and insulin gtt   Likely multifactorial and starvation playing a role   BMPq4h  POCT glucose q1H

## 2023-10-16 NOTE — CARE UPDATE
10/15/23 1858   Patient Assessment/Suction   Level of Consciousness (AVPU) responds to voice   Respiratory Effort Unlabored   PRE-TX-O2   Device (Oxygen Therapy) nasal cannula   $ Is the patient on Low Flow Oxygen? Yes   Flow (L/min) 2   SpO2 99 %   Pulse Oximetry Type Continuous   $ Pulse Oximetry - Multiple Charge Pulse Oximetry - Multiple   Pulse 74   Resp 14

## 2023-10-16 NOTE — PROGRESS NOTES
[facility] Medicine  Progress Note    Patient Name: Tabby Howard  MRN: [id number]  Patient Class: IP- Inpatient   Admission Date: 10/15/2023  Length of Stay: 1 days  Attending Physician: Linda Cueto MD  Primary Care Provider: Melony Kim NP        Subjective:     Principal Problem:Ketoacidosis        HPI:   Patient is a 33-year-old female with ESRD on HD, history of DVT, gastroparesis, and type 2 diabetes who presents with abdominal pain. It is severe. It is associated with anorexia and N/V. She denies fever, chills, chest pain, SOB. She was discharged from [facility] on 10/14 after and admission for missed HD an abd pain. She was dialyzed and discharged home. US abd 2 days ago was negative for acute findings. She has recurrent abd pain and this is her 5th admission in 1 month. She initially states she was taking her insulin at home then states she did not take it. In the ED, bicarb 13, anion gap 25, glucose 180 and BHB 5.9, pH 7.23. Hospital medicine is consulted for admission for further work up and treatment.       Overview/Hospital Course:  No notes on file    Interval History:  Patient seen and examined.  Remains on insulin drip.  Complains of headache this morning    Review of Systems   Respiratory:  Negative for shortness of breath.    Gastrointestinal:  Negative for abdominal pain.   Neurological:  Positive for headaches.     Objective:     Vital Signs (Most Recent):  Temp: 97.5 °F (36.4 °C) (10/16/23 0822)  Pulse: 65 (10/16/23 0837)  Resp: 14 (10/16/23 0947)  BP: 117/74 (10/16/23 0600)  SpO2: 100 % (10/16/23 0837) Vital Signs (24h Range):  Temp:  [97.5 °F (36.4 °C)-98.5 °F (36.9 °C)] 97.5 °F (36.4 °C)  Pulse:  [65-86] 65  Resp:  [8-24] 14  SpO2:  [94 %-100 %] 100 %  BP: ()/(59-93) 117/74     Weight: 55.3 kg (121 lb 14.6 oz)  Body mass index is 22.29 kg/m².    Intake/Output Summary (Last 24 hours) at 10/16/2023 1035  Last data filed at 10/16/2023 6763  Gross per  24 hour   Intake 1090 ml   Output --   Net 1090 ml         Physical Exam  Constitutional:       General: She is not in acute distress.     Appearance: She is well-developed. She is ill-appearing.   HENT:      Head: Normocephalic and atraumatic.   Eyes:      Pupils: Pupils are equal, round, and reactive to light.   Cardiovascular:      Rate and Rhythm: Normal rate and regular rhythm.      Heart sounds: No murmur heard.  Pulmonary:      Effort: Pulmonary effort is normal. No respiratory distress.      Breath sounds: Normal breath sounds. No wheezing or rales.   Abdominal:      General: Bowel sounds are normal. There is no distension.      Palpations: Abdomen is soft.      Tenderness: There is no abdominal tenderness.   Musculoskeletal:         General: Normal range of motion.   Skin:     General: Skin is warm and dry.      Findings: No rash.   Neurological:      Mental Status: She is alert and oriented to person, place, and time.      Cranial Nerves: No cranial nerve deficit.   Psychiatric:         Behavior: Behavior normal.             Significant Labs: All pertinent labs within the past 24 hours have been reviewed.    Significant Imaging: I have reviewed all pertinent imaging results/findings within the past 24 hours.      Assessment/Plan:      * Ketoacidosis  Admit to stepdown   D5 1/2NS and insulin gtt   Likely multifactorial and starvation playing a role   BMPq4h  POCT glucose q1H      GERD (gastroesophageal reflux disease)  Continue PPI    Chronic congestive heart failure with left ventricular diastolic dysfunction  Patient is identified as having Diastolic (HFpEF) heart failure that is Chronic. CHF is currently controlled. Latest ECHO performed and demonstrates- Results for orders placed during the hospital encounter of 09/10/23    Echo    Interpretation Summary    Left Ventricle: The left ventricle is normal in size. Mildly increased ventricular mass. Mildly increased wall thickness. Normal wall motion. There  "is low normal systolic function with a visually estimated ejection fraction of 50 - 55%. There is normal diastolic function.    Right Ventricle: Normal right ventricular cavity size. Wall thickness is normal. Right ventricle wall motion  is normal. Systolic function is normal.    IVC/SVC: Normal venous pressure at 3 mmHg.    Pericardium: There is a small circumferential effusion. Pericardial effusion is echolucent. No indication of cardiac tamponade. Evidence includes no chamber collapse.  . Continue Nitrate/Vasodilator and monitor clinical status closely. Monitor on telemetry. Patient is off CHF pathway.  Monitor strict Is&Os and daily weights.  Place on fluid restriction of no fluid restriction while on gtt. Cardiology has not been any consulted. Continue to stress to patient importance of self efficacy and  on diet for CHF. Last BNP reviewed- and noted below   Recent Labs   Lab 10/13/23  0851   BNP 3,757*   .    Type 2 diabetes mellitus with hyperglycemia, with long-term current use of insulin, previous HbA1c 5.8%  Patient's FSGs are uncontrolled due to hyperglycemia on current medication regimen.  Last A1c reviewed-   Lab Results   Component Value Date    HGBA1C 5.8 08/01/2023     Most recent fingerstick glucose reviewed- No results for input(s): "POCTGLUCOSE" in the last 24 hours.  Current correctional scale  gtt  Maintain anti-hyperglycemic dose as follows-   Antihyperglycemics (From admission, onward)    Start     Stop Route Frequency Ordered    10/15/23 0430  insulin regular 1 Units/mL in sodium chloride 0.9% 100 mL infusion        Question Answer Comment   Insulin Rate Adjustment (DO NOT MODIFY ANSWER) \\[email or web address] ADJUSTMENT DKA version [email or web address]    Initial dose (DO NOT CHANGE): 0.1 units/kg/hr        -- IV Continuous 10/15/23 8047        Hold Oral hypoglycemics while patient is in the hospital.        Thrombocytopenia  Chronic monitor cbc      ESRD " (end stage renal disease)  Consult nephrology for HD management  Daily wt/accurate I&O      Seizure disorder  Continue keppra        VTE Risk Mitigation (From admission, onward)         Ordered     apixaban tablet 5 mg  2 times daily         10/15/23 0449     IP VTE HIGH RISK PATIENT  Once         10/15/23 0449     Place sequential compression device  Until discontinued         10/15/23 0449     Place sequential compression device  Until discontinued         10/15/23 0418                Discharge Planning   TATIANA:      Code Status: Full Code   Is the patient medically ready for discharge?:     Reason for patient still in hospital (select all that apply): Patient trending condition and Treatment                     Linda Cueto MD  Department of Hospital Medicine   [facility]/Surg

## 2023-10-16 NOTE — NURSING
Primary nurse informed resource nurse SHAILA Membreno. Pt BG 51 mg/dl. Pt is asymptomatic, PRN IV D10 given, rechecked  mg/dl. Will monitor

## 2023-10-17 PROBLEM — E44.1 MILD MALNUTRITION: Status: ACTIVE | Noted: 2022-10-05

## 2023-10-17 LAB
ALBUMIN SERPL BCP-MCNC: 2.8 G/DL (ref 3.5–5.2)
ALP SERPL-CCNC: 325 U/L (ref 55–135)
ALT SERPL W/O P-5'-P-CCNC: 65 U/L (ref 10–44)
ANION GAP SERPL CALC-SCNC: 10 MMOL/L (ref 8–16)
ANION GAP SERPL CALC-SCNC: 12 MMOL/L (ref 8–16)
ANION GAP SERPL CALC-SCNC: 13 MMOL/L (ref 8–16)
ANION GAP SERPL CALC-SCNC: 13 MMOL/L (ref 8–16)
ANION GAP SERPL CALC-SCNC: 14 MMOL/L (ref 8–16)
AST SERPL-CCNC: 25 U/L (ref 10–40)
B-OH-BUTYR BLD STRIP-SCNC: 0 MMOL/L (ref 0–0.5)
BASOPHILS # BLD AUTO: 0.06 K/UL (ref 0–0.2)
BASOPHILS NFR BLD: 0.9 % (ref 0–1.9)
BILIRUB SERPL-MCNC: 1 MG/DL (ref 0.1–1)
BUN SERPL-MCNC: 12 MG/DL (ref 6–20)
BUN SERPL-MCNC: 12 MG/DL (ref 6–20)
BUN SERPL-MCNC: 15 MG/DL (ref 6–20)
BUN SERPL-MCNC: 34 MG/DL (ref 6–20)
BUN SERPL-MCNC: 35 MG/DL (ref 6–20)
BUN SERPL-MCNC: 35 MG/DL (ref 6–20)
BUN SERPL-MCNC: 36 MG/DL (ref 6–20)
CALCIUM SERPL-MCNC: 7.9 MG/DL (ref 8.7–10.5)
CALCIUM SERPL-MCNC: 8.1 MG/DL (ref 8.7–10.5)
CALCIUM SERPL-MCNC: 8.1 MG/DL (ref 8.7–10.5)
CALCIUM SERPL-MCNC: 8.2 MG/DL (ref 8.7–10.5)
CHLORIDE SERPL-SCNC: 105 MMOL/L (ref 95–110)
CHLORIDE SERPL-SCNC: 107 MMOL/L (ref 95–110)
CHLORIDE SERPL-SCNC: 98 MMOL/L (ref 95–110)
CHLORIDE SERPL-SCNC: 99 MMOL/L (ref 95–110)
CHLORIDE SERPL-SCNC: 99 MMOL/L (ref 95–110)
CO2 SERPL-SCNC: 14 MMOL/L (ref 23–29)
CO2 SERPL-SCNC: 15 MMOL/L (ref 23–29)
CO2 SERPL-SCNC: 23 MMOL/L (ref 23–29)
CO2 SERPL-SCNC: 23 MMOL/L (ref 23–29)
CO2 SERPL-SCNC: 26 MMOL/L (ref 23–29)
CREAT SERPL-MCNC: 3 MG/DL (ref 0.5–1.4)
CREAT SERPL-MCNC: 3.4 MG/DL (ref 0.5–1.4)
CREAT SERPL-MCNC: 3.9 MG/DL (ref 0.5–1.4)
CREAT SERPL-MCNC: 6.2 MG/DL (ref 0.5–1.4)
CREAT SERPL-MCNC: 6.4 MG/DL (ref 0.5–1.4)
CREAT SERPL-MCNC: 6.4 MG/DL (ref 0.5–1.4)
CREAT SERPL-MCNC: 6.5 MG/DL (ref 0.5–1.4)
DIFFERENTIAL METHOD: ABNORMAL
EOSINOPHIL # BLD AUTO: 0.5 K/UL (ref 0–0.5)
EOSINOPHIL NFR BLD: 7.7 % (ref 0–8)
ERYTHROCYTE [DISTWIDTH] IN BLOOD BY AUTOMATED COUNT: 19.3 % (ref 11.5–14.5)
EST. GFR  (NO RACE VARIABLE): 15 ML/MIN/1.73 M^2
EST. GFR  (NO RACE VARIABLE): 17 ML/MIN/1.73 M^2
EST. GFR  (NO RACE VARIABLE): 20 ML/MIN/1.73 M^2
EST. GFR  (NO RACE VARIABLE): 8 ML/MIN/1.73 M^2
GLUCOSE SERPL-MCNC: 129 MG/DL (ref 70–110)
GLUCOSE SERPL-MCNC: 132 MG/DL (ref 70–110)
GLUCOSE SERPL-MCNC: 199 MG/DL (ref 70–110)
GLUCOSE SERPL-MCNC: 206 MG/DL (ref 70–110)
GLUCOSE SERPL-MCNC: 219 MG/DL (ref 70–110)
GLUCOSE SERPL-MCNC: 87 MG/DL (ref 70–110)
GLUCOSE SERPL-MCNC: 87 MG/DL (ref 70–110)
HCT VFR BLD AUTO: 25.6 % (ref 37–48.5)
HGB BLD-MCNC: 8.1 G/DL (ref 12–16)
IMM GRANULOCYTES # BLD AUTO: 0.07 K/UL (ref 0–0.04)
IMM GRANULOCYTES NFR BLD AUTO: 1 % (ref 0–0.5)
LYMPHOCYTES # BLD AUTO: 1.5 K/UL (ref 1–4.8)
LYMPHOCYTES NFR BLD: 22.4 % (ref 18–48)
MAGNESIUM SERPL-MCNC: 2.1 MG/DL (ref 1.6–2.6)
MCH RBC QN AUTO: 28.8 PG (ref 27–31)
MCHC RBC AUTO-ENTMCNC: 31.6 G/DL (ref 32–36)
MCV RBC AUTO: 91 FL (ref 82–98)
MONOCYTES # BLD AUTO: 0.6 K/UL (ref 0.3–1)
MONOCYTES NFR BLD: 9 % (ref 4–15)
NEUTROPHILS # BLD AUTO: 4 K/UL (ref 1.8–7.7)
NEUTROPHILS NFR BLD: 59 % (ref 38–73)
NRBC BLD-RTO: 1 /100 WBC
PLATELET # BLD AUTO: 139 K/UL (ref 150–450)
PMV BLD AUTO: 9 FL (ref 9.2–12.9)
POCT GLUCOSE: 114 MG/DL (ref 70–110)
POCT GLUCOSE: 118 MG/DL (ref 70–110)
POCT GLUCOSE: 123 MG/DL (ref 70–110)
POCT GLUCOSE: 130 MG/DL (ref 70–110)
POCT GLUCOSE: 134 MG/DL (ref 70–110)
POCT GLUCOSE: 137 MG/DL (ref 70–110)
POCT GLUCOSE: 141 MG/DL (ref 70–110)
POCT GLUCOSE: 149 MG/DL (ref 70–110)
POCT GLUCOSE: 154 MG/DL (ref 70–110)
POCT GLUCOSE: 187 MG/DL (ref 70–110)
POCT GLUCOSE: 196 MG/DL (ref 70–110)
POCT GLUCOSE: 206 MG/DL (ref 70–110)
POCT GLUCOSE: 209 MG/DL (ref 70–110)
POCT GLUCOSE: 234 MG/DL (ref 70–110)
POTASSIUM SERPL-SCNC: 3.5 MMOL/L (ref 3.5–5.1)
POTASSIUM SERPL-SCNC: 3.8 MMOL/L (ref 3.5–5.1)
POTASSIUM SERPL-SCNC: 4.1 MMOL/L (ref 3.5–5.1)
POTASSIUM SERPL-SCNC: 4.3 MMOL/L (ref 3.5–5.1)
POTASSIUM SERPL-SCNC: 4.3 MMOL/L (ref 3.5–5.1)
POTASSIUM SERPL-SCNC: 4.5 MMOL/L (ref 3.5–5.1)
POTASSIUM SERPL-SCNC: 4.9 MMOL/L (ref 3.5–5.1)
PROT SERPL-MCNC: 6 G/DL (ref 6–8.4)
RBC # BLD AUTO: 2.81 M/UL (ref 4–5.4)
SODIUM SERPL-SCNC: 132 MMOL/L (ref 136–145)
SODIUM SERPL-SCNC: 132 MMOL/L (ref 136–145)
SODIUM SERPL-SCNC: 133 MMOL/L (ref 136–145)
SODIUM SERPL-SCNC: 133 MMOL/L (ref 136–145)
SODIUM SERPL-SCNC: 134 MMOL/L (ref 136–145)
SODIUM SERPL-SCNC: 135 MMOL/L (ref 136–145)
SODIUM SERPL-SCNC: 136 MMOL/L (ref 136–145)
WBC # BLD AUTO: 6.79 K/UL (ref 3.9–12.7)

## 2023-10-17 PROCEDURE — 63600175 PHARM REV CODE 636 W HCPCS: Performed by: NURSE PRACTITIONER

## 2023-10-17 PROCEDURE — 25000003 PHARM REV CODE 250: Performed by: NURSE PRACTITIONER

## 2023-10-17 PROCEDURE — 36415 COLL VENOUS BLD VENIPUNCTURE: CPT | Performed by: NURSE PRACTITIONER

## 2023-10-17 PROCEDURE — 63600175 PHARM REV CODE 636 W HCPCS: Performed by: HOSPITALIST

## 2023-10-17 PROCEDURE — 80048 BASIC METABOLIC PNL TOTAL CA: CPT | Mod: 91 | Performed by: NURSE PRACTITIONER

## 2023-10-17 PROCEDURE — 94761 N-INVAS EAR/PLS OXIMETRY MLT: CPT

## 2023-10-17 PROCEDURE — 20600001 HC STEP DOWN PRIVATE ROOM

## 2023-10-17 PROCEDURE — 80048 BASIC METABOLIC PNL TOTAL CA: CPT | Mod: XB | Performed by: NURSE PRACTITIONER

## 2023-10-17 PROCEDURE — 63600175 PHARM REV CODE 636 W HCPCS: Performed by: INTERNAL MEDICINE

## 2023-10-17 PROCEDURE — 83735 ASSAY OF MAGNESIUM: CPT | Performed by: NURSE PRACTITIONER

## 2023-10-17 PROCEDURE — 63600175 PHARM REV CODE 636 W HCPCS: Mod: JW | Performed by: NURSE PRACTITIONER

## 2023-10-17 PROCEDURE — 85025 COMPLETE CBC W/AUTO DIFF WBC: CPT | Performed by: NURSE PRACTITIONER

## 2023-10-17 PROCEDURE — G0378 HOSPITAL OBSERVATION PER HR: HCPCS

## 2023-10-17 PROCEDURE — 82010 KETONE BODYS QUAN: CPT | Performed by: NURSE PRACTITIONER

## 2023-10-17 PROCEDURE — 80053 COMPREHEN METABOLIC PANEL: CPT | Performed by: NURSE PRACTITIONER

## 2023-10-17 PROCEDURE — 96372 THER/PROPH/DIAG INJ SC/IM: CPT | Performed by: HOSPITALIST

## 2023-10-17 PROCEDURE — 25000003 PHARM REV CODE 250: Performed by: HOSPITALIST

## 2023-10-17 RX ORDER — IBUPROFEN 200 MG
24 TABLET ORAL
Status: DISCONTINUED | OUTPATIENT
Start: 2023-10-17 | End: 2023-10-18 | Stop reason: HOSPADM

## 2023-10-17 RX ORDER — BUTALBITAL, ACETAMINOPHEN AND CAFFEINE 50; 325; 40 MG/1; MG/1; MG/1
1 TABLET ORAL EVERY 4 HOURS PRN
Status: DISCONTINUED | OUTPATIENT
Start: 2023-10-17 | End: 2023-10-18 | Stop reason: HOSPADM

## 2023-10-17 RX ORDER — INSULIN ASPART 100 [IU]/ML
0-10 INJECTION, SOLUTION INTRAVENOUS; SUBCUTANEOUS
Status: DISCONTINUED | OUTPATIENT
Start: 2023-10-17 | End: 2023-10-18 | Stop reason: HOSPADM

## 2023-10-17 RX ORDER — IBUPROFEN 200 MG
16 TABLET ORAL
Status: DISCONTINUED | OUTPATIENT
Start: 2023-10-17 | End: 2023-10-18 | Stop reason: HOSPADM

## 2023-10-17 RX ORDER — HEPARIN SODIUM 1000 [USP'U]/ML
4000 INJECTION, SOLUTION INTRAVENOUS; SUBCUTANEOUS
Status: DISCONTINUED | OUTPATIENT
Start: 2023-10-17 | End: 2023-10-18 | Stop reason: HOSPADM

## 2023-10-17 RX ORDER — GLUCAGON 1 MG
1 KIT INJECTION
Status: DISCONTINUED | OUTPATIENT
Start: 2023-10-17 | End: 2023-10-18 | Stop reason: HOSPADM

## 2023-10-17 RX ORDER — DIPHENHYDRAMINE HCL 25 MG
25 CAPSULE ORAL EVERY 6 HOURS PRN
Status: DISCONTINUED | OUTPATIENT
Start: 2023-10-17 | End: 2023-10-18 | Stop reason: HOSPADM

## 2023-10-17 RX ADMIN — HEPARIN SODIUM 2000 UNITS: 1000 INJECTION, SOLUTION INTRAVENOUS; SUBCUTANEOUS at 01:10

## 2023-10-17 RX ADMIN — EPOETIN ALFA 2760 UNITS: 4000 SOLUTION INTRAVENOUS; SUBCUTANEOUS at 11:10

## 2023-10-17 RX ADMIN — PREDNISOLONE ACETATE 1 DROP: 10 SUSPENSION/ DROPS OPHTHALMIC at 03:10

## 2023-10-17 RX ADMIN — LEVETIRACETAM 500 MG: 500 TABLET, FILM COATED ORAL at 09:10

## 2023-10-17 RX ADMIN — TIMOLOL MALEATE 1 DROP: 5 SOLUTION/ DROPS OPHTHALMIC at 08:10

## 2023-10-17 RX ADMIN — FLUTICASONE PROPIONATE 50 MCG: 50 SPRAY, METERED NASAL at 04:10

## 2023-10-17 RX ADMIN — KETOROLAC TROMETHAMINE 1 DROP: 5 SOLUTION/ DROPS OPHTHALMIC at 03:10

## 2023-10-17 RX ADMIN — INSULIN DETEMIR 9 UNITS: 100 INJECTION, SOLUTION SUBCUTANEOUS at 09:10

## 2023-10-17 RX ADMIN — CIPROFLOXACIN HYDROCHLORIDE 0.5 INCH: 3 OINTMENT OPHTHALMIC at 05:10

## 2023-10-17 RX ADMIN — MORPHINE SULFATE 2 MG: 2 INJECTION, SOLUTION INTRAMUSCULAR; INTRAVENOUS at 12:10

## 2023-10-17 RX ADMIN — CARVEDILOL 25 MG: 25 TABLET, FILM COATED ORAL at 09:10

## 2023-10-17 RX ADMIN — GABAPENTIN 600 MG: 300 CAPSULE ORAL at 09:10

## 2023-10-17 RX ADMIN — GABAPENTIN 600 MG: 300 CAPSULE ORAL at 08:10

## 2023-10-17 RX ADMIN — INSULIN ASPART 2 UNITS: 100 INJECTION, SOLUTION INTRAVENOUS; SUBCUTANEOUS at 09:10

## 2023-10-17 RX ADMIN — CIPROFLOXACIN HYDROCHLORIDE 0.5 INCH: 3 OINTMENT OPHTHALMIC at 03:10

## 2023-10-17 RX ADMIN — LEVETIRACETAM 500 MG: 500 TABLET, FILM COATED ORAL at 08:10

## 2023-10-17 RX ADMIN — KETOROLAC TROMETHAMINE 1 DROP: 5 SOLUTION/ DROPS OPHTHALMIC at 08:10

## 2023-10-17 RX ADMIN — APIXABAN 5 MG: 2.5 TABLET, FILM COATED ORAL at 08:10

## 2023-10-17 RX ADMIN — BUTALBITAL, ACETAMINOPHEN, AND CAFFEINE 1 TABLET: 50; 325; 40 TABLET ORAL at 09:10

## 2023-10-17 RX ADMIN — CIPROFLOXACIN HYDROCHLORIDE 0.5 INCH: 3 OINTMENT OPHTHALMIC at 09:10

## 2023-10-17 RX ADMIN — AMLODIPINE BESYLATE 5 MG: 5 TABLET ORAL at 08:10

## 2023-10-17 RX ADMIN — PREDNISOLONE ACETATE 1 DROP: 10 SUSPENSION/ DROPS OPHTHALMIC at 07:10

## 2023-10-17 RX ADMIN — TIMOLOL MALEATE 1 DROP: 5 SOLUTION/ DROPS OPHTHALMIC at 09:10

## 2023-10-17 RX ADMIN — APIXABAN 5 MG: 2.5 TABLET, FILM COATED ORAL at 09:10

## 2023-10-17 RX ADMIN — PREDNISOLONE ACETATE 1 DROP: 10 SUSPENSION/ DROPS OPHTHALMIC at 11:10

## 2023-10-17 RX ADMIN — INSULIN ASPART 2 UNITS: 100 INJECTION, SOLUTION INTRAVENOUS; SUBCUTANEOUS at 04:10

## 2023-10-17 RX ADMIN — HYDRALAZINE HYDROCHLORIDE 100 MG: 25 TABLET, FILM COATED ORAL at 09:10

## 2023-10-17 RX ADMIN — KETOROLAC TROMETHAMINE 1 DROP: 5 SOLUTION/ DROPS OPHTHALMIC at 09:10

## 2023-10-17 RX ADMIN — MORPHINE SULFATE 2 MG: 2 INJECTION, SOLUTION INTRAMUSCULAR; INTRAVENOUS at 06:10

## 2023-10-17 RX ADMIN — FLUOXETINE 20 MG: 20 CAPSULE ORAL at 08:10

## 2023-10-17 RX ADMIN — CETIRIZINE HYDROCHLORIDE 10 MG: 10 TABLET, FILM COATED ORAL at 08:10

## 2023-10-17 RX ADMIN — BUTALBITAL, ACETAMINOPHEN, AND CAFFEINE 1 TABLET: 50; 325; 40 TABLET ORAL at 07:10

## 2023-10-17 RX ADMIN — DIPHENHYDRAMINE HYDROCHLORIDE 25 MG: 25 CAPSULE ORAL at 02:10

## 2023-10-17 RX ADMIN — INSULIN ASPART 2 UNITS: 100 INJECTION, SOLUTION INTRAVENOUS; SUBCUTANEOUS at 11:10

## 2023-10-17 RX ADMIN — MUPIROCIN: 20 OINTMENT TOPICAL at 09:10

## 2023-10-17 RX ADMIN — PANTOPRAZOLE SODIUM 40 MG: 40 TABLET, DELAYED RELEASE ORAL at 08:10

## 2023-10-17 RX ADMIN — CIPROFLOXACIN HYDROCHLORIDE 0.5 INCH: 3 OINTMENT OPHTHALMIC at 11:10

## 2023-10-17 RX ADMIN — INSULIN DETEMIR 9 UNITS: 100 INJECTION, SOLUTION SUBCUTANEOUS at 08:10

## 2023-10-17 NOTE — PLAN OF CARE
Recommendations   1) Change diet to DM 2000 kcal, renal, low fiber diet   2) Add Boost glucose control BID   3) Assist with meal set up   4) weigh s/p HD   5) nutrition education and handout reviewed   6) If pt starts vomiting change diet to sugar free full liquids + Boost glucose control with meals    Goals: 1) PO Intakes > 50% of meals and supplements at f/u  Nutrition Goal Status: new  Communication of RD Recs: reviewed with physician (POC, sticky note)

## 2023-10-17 NOTE — PLAN OF CARE
Problem: Adult Inpatient Plan of Care  Goal: Plan of Care Review  Outcome: Ongoing, Progressing     Problem: Adult Inpatient Plan of Care  Goal: Absence of Hospital-Acquired Illness or Injury  Outcome: Ongoing, Progressing     Problem: Adult Inpatient Plan of Care  Goal: Optimal Comfort and Wellbeing  Outcome: Ongoing, Progressing     Problem: Diabetes Comorbidity  Goal: Blood Glucose Level Within Targeted Range  Outcome: Ongoing, Progressing     Problem: Infection  Goal: Absence of Infection Signs and Symptoms  Outcome: Ongoing, Progressing     Problem: Infection (Hemodialysis)  Goal: Absence of Infection Signs and Symptoms  Outcome: Ongoing, Progressing

## 2023-10-17 NOTE — EICU
Intervention Initiated From:  COR / EICU    Roxann intervened regarding:  Documentation    Nurse Notified:  No    Doctor Notified:  No    Comments: Video rounding completed on patient. VS: P: 68-NSR, RR 23 non-labored, NIBP: 114/73 (89) PIV: inserted 10/15 # 22 angio catheter, not due to be changed.. NAD, WCTM as needed. Insulin drip at 0.1 units/kg/hr. HD set up at present.

## 2023-10-17 NOTE — NURSING
Wound care assessment due to listed LDA's from August. Coccyx wound is healed at this assessment. The upper back thoracic spine wound is healed with scar tissue discoloration noted. Wound care is not consulted at this time. Please consult wound care if needed throughout patients hospital stay.

## 2023-10-17 NOTE — PROGRESS NOTES
[facility] Medicine  Progress Note    Patient Name: Tabby Howard  MRN: [id number]  Patient Class: IP- Inpatient   Admission Date: 10/15/2023  Length of Stay: 2 days  Attending Physician: Linda Cueto MD  Primary Care Provider: Melony Kim NP        Subjective:     Principal Problem:Ketoacidosis        HPI:   Patient is a 33-year-old female with ESRD on HD, history of DVT, gastroparesis, and type 2 diabetes who presents with abdominal pain. It is severe. It is associated with anorexia and N/V. She denies fever, chills, chest pain, SOB. She was discharged from [facility] on 10/14 after and admission for missed HD an abd pain. She was dialyzed and discharged home. US abd 2 days ago was negative for acute findings. She has recurrent abd pain and this is her 5th admission in 1 month. She initially states she was taking her insulin at home then states she did not take it. In the ED, bicarb 13, anion gap 25, glucose 180 and BHB 5.9, pH 7.23. Hospital medicine is consulted for admission for further work up and treatment.       Overview/Hospital Course:  No notes on file    Interval History:  Patient seen and examined.  Acidosis improving.  Will transition off of insulin drip today.  Continues to have headache.  Will try Fioricet    Review of Systems   Respiratory:  Negative for shortness of breath.    Gastrointestinal:  Negative for abdominal pain.   Neurological:  Positive for headaches.     Objective:     Vital Signs (Most Recent):  Temp: 98 °F (36.7 °C) (10/17/23 0950)  Pulse: 75 (10/17/23 1030)  Resp: 20 (10/17/23 1030)  BP: 108/68 (10/17/23 1030)  SpO2: 96 % (10/17/23 0950) Vital Signs (24h Range):  Temp:  [97.4 °F (36.3 °C)-98.2 °F (36.8 °C)] 98 °F (36.7 °C)  Pulse:  [62-76] 75  Resp:  [10-33] 20  SpO2:  [90 %-100 %] 96 %  BP: ()/(50-78) 108/68     Weight: 59.6 kg (131 lb 6.3 oz)  Body mass index is 24.03 kg/m².    Intake/Output Summary (Last 24 hours) at 10/17/2023  1034  Last data filed at 10/17/2023 0216  Gross per 24 hour   Intake 1295 ml   Output --   Net 1295 ml           Physical Exam  Constitutional:       General: She is not in acute distress.     Appearance: She is well-developed. She is ill-appearing.   HENT:      Head: Normocephalic and atraumatic.   Eyes:      Pupils: Pupils are equal, round, and reactive to light.   Cardiovascular:      Rate and Rhythm: Normal rate and regular rhythm.      Heart sounds: No murmur heard.  Pulmonary:      Effort: Pulmonary effort is normal. No respiratory distress.      Breath sounds: Normal breath sounds. No wheezing or rales.   Abdominal:      General: Bowel sounds are normal. There is no distension.      Palpations: Abdomen is soft.      Tenderness: There is no abdominal tenderness.   Musculoskeletal:         General: Normal range of motion.   Skin:     General: Skin is warm and dry.      Findings: No rash.   Neurological:      Mental Status: She is alert and oriented to person, place, and time.      Cranial Nerves: No cranial nerve deficit.   Psychiatric:         Behavior: Behavior normal.             Significant Labs: All pertinent labs within the past 24 hours have been reviewed.    Significant Imaging: I have reviewed all pertinent imaging results/findings within the past 24 hours.      Assessment/Plan:      * Ketoacidosis  Transition off of insulin drip today.  Continue to monitor BMP q.4      GERD (gastroesophageal reflux disease)  Continue PPI    Chronic congestive heart failure with left ventricular diastolic dysfunction  Patient is identified as having Diastolic (HFpEF) heart failure that is Chronic. CHF is currently controlled. Latest ECHO performed and demonstrates- Results for orders placed during the hospital encounter of 09/10/23    Echo    Interpretation Summary    Left Ventricle: The left ventricle is normal in size. Mildly increased ventricular mass. Mildly increased wall thickness. Normal wall motion. There is  "low normal systolic function with a visually estimated ejection fraction of 50 - 55%. There is normal diastolic function.    Right Ventricle: Normal right ventricular cavity size. Wall thickness is normal. Right ventricle wall motion  is normal. Systolic function is normal.    IVC/SVC: Normal venous pressure at 3 mmHg.    Pericardium: There is a small circumferential effusion. Pericardial effusion is echolucent. No indication of cardiac tamponade. Evidence includes no chamber collapse.  . Continue Nitrate/Vasodilator and monitor clinical status closely. Monitor on telemetry. Patient is off CHF pathway.  Monitor strict Is&Os and daily weights.  Place on fluid restriction of no fluid restriction while on gtt. Cardiology has not been any consulted. Continue to stress to patient importance of self efficacy and  on diet for CHF. Last BNP reviewed- and noted below   Recent Labs   Lab 10/13/23  0851   BNP 3,757*   .    Type 2 diabetes mellitus with hyperglycemia, with long-term current use of insulin, previous HbA1c 5.8%  Patient's FSGs are uncontrolled due to hyperglycemia on current medication regimen.  Last A1c reviewed-   Lab Results   Component Value Date    HGBA1C 5.8 08/01/2023     Most recent fingerstick glucose reviewed- No results for input(s): "POCTGLUCOSE" in the last 24 hours.  Current correctional scale  gtt  Maintain anti-hyperglycemic dose as follows-   Antihyperglycemics (From admission, onward)    Start     Stop Route Frequency Ordered    10/15/23 0430  insulin regular 1 Units/mL in sodium chloride 0.9% 100 mL infusion        Question Answer Comment   Insulin Rate Adjustment (DO NOT MODIFY ANSWER) \\[email or web address] [email or web address] DKA version [email or web address]    Initial dose (DO NOT CHANGE): 0.1 units/kg/hr        -- IV Continuous 10/15/23 8083        Hold Oral hypoglycemics while patient is in the hospital.        Thrombocytopenia  Chronic monitor cbc      Mild " malnutrition        ESRD (end stage renal disease)  Consult nephrology for HD management  Daily wt/accurate I&O      Seizure disorder  Continue keppra        VTE Risk Mitigation (From admission, onward)         Ordered     apixaban tablet 5 mg  2 times daily         10/15/23 0449     IP VTE HIGH RISK PATIENT  Once         10/15/23 0449     Place sequential compression device  Until discontinued         10/15/23 0449     Place sequential compression device  Until discontinued         10/15/23 0418                Discharge Planning   TATIANA: 10/18/2023     Code Status: Full Code   Is the patient medically ready for discharge?:     Reason for patient still in hospital (select all that apply): Patient trending condition and Treatment  Discharge Plan A: Home with family                  Linda Cueto MD  Department of Hospital Medicine   [facility]/Surg

## 2023-10-17 NOTE — CONSULTS
Consult Note  Nephrology    Consult Requested By: Linda Cueto MD    Reason for Consult:   ESRD, dependent on dialysis    SUBJECTIVE:     History of Present Illness:   HPI patient is a 33-year-old woman with a history of end-stage renal disease on hemodialysis, gastroparesis, chronic abdominal pain, heart failure with preserved ejection fracture and type 2 diabetes who presented to emergency department early this am complaining of lower abdominal cramping pain radiating to her back since yesterday around noon.  She is had no associated fever.  Of note she was discharged from [facility] around 4:00 p.m. yesterday.  She had been admitted for abdominal pain and right eye pain and was evaluated by ophthalmology.  She received hemodialysis in the hospital yesterday.    She is now admitted to [facility], on insulin gtt.  Nephrology is consulted for dialysis orders.    10/16  c/o headache.  No sob or cp.  Afebrile. VSS.     10/17  Patient seen on hemodialysis for uremic clearance and ultrafiltration.        Assessment/plan:    Assessment:  ESRD on HD TTS  HTN  Hyperkalemia  SHPT  Anemia of CKD     Plan:     - HD TTS, PRN tx if clearance need arises.  Dose all medications for dialysis  - continue home BP meds  - UF 2-4L with hd  - renal diet, 1.5L fluid restriction  - adjust dialysate  - Phosphorus level at goal.  Can resume binders at d/c.  Her outpatient binders aren't on formulary here.   Low phos diet when diet resumed.    - ordered SHELLEY with HD    Past Medical History:   Diagnosis Date    ESRD on hemodialysis 04/12/2022    Gastritis 12/2022    EGD was 12/5/22    Gastroparesis 04/12/2022    has not had Emptying study    Heart failure with preserved ejection fraction 04/12/2022    EF 55% on 3/22    History of supraventricular tachycardia     Hyperkalemia 07/07/2022    Hypertensive emergency 07/08/2022    Sickle cell trait 04/12/2022    Type 2 diabetes mellitus      Past Surgical History:   Procedure  Laterality Date     SECTION      x 3    COLONOSCOPY      COLONOSCOPY N/A 2022    Procedure: COLONOSCOPY;  Surgeon: Jagdeep Cedeno MD;  Location: [facility] ENDO;  Service: Endoscopy;  Laterality: N/A;    ESOPHAGOGASTRODUODENOSCOPY N/A 10/18/2019    Procedure: ESOPHAGOGASTRODUODENOSCOPY (EGD);  Surgeon: Gianluca Mendez MD;  Location: [facility] ENDO;  Service: Endoscopy;  Laterality: N/A;    ESOPHAGOGASTRODUODENOSCOPY N/A 2022    Procedure: EGD (ESOPHAGOGASTRODUODENOSCOPY);  Surgeon: Micky Paredes III, MD;  Location: [facility];  Service: Endoscopy;  Laterality: N/A;    ESOPHAGOGASTRODUODENOSCOPY N/A 2022    Procedure: EGD (ESOPHAGOGASTRODUODENOSCOPY);  Surgeon: Marcelo Zhong MD;  Location: [facility] ENDO;  Service: Endoscopy;  Laterality: N/A;    INSERTION, CATHETER, TUNNELED N/A 2023    Procedure: Insertion,catheter,tunneled;  Surgeon: Carlos Thurman Jr., MD;  Location: [facility] OR;  Service: General;  Laterality: N/A;    LAPAROSCOPIC CHOLECYSTECTOMY N/A 2022    Procedure: CHOLECYSTECTOMY, LAPAROSCOPIC;  Surgeon: Grey Perez MD;  Location: [facility] OR;  Service: General;  Laterality: N/A;    PLACEMENT OF DUAL-LUMEN VASCULAR CATHETER Left 2022    Procedure: INSERTION-CATHETER-JOSEPH;  Surgeon: Dionte Gan MD;  Location: [facility] OR;  Service: General;  Laterality: Left;    PLACEMENT OF DUAL-LUMEN VASCULAR CATHETER Right 2022    Procedure: INSERTION-CATHETER-Hemosplit;  Surgeon: Dionte Gan MD;  Location: [facility] OR;  Service: General;  Laterality: Right;     Family History   Problem Relation Age of Onset    Diabetes Mother     Diabetes Father      Social History     Tobacco Use    Smoking status: Never    Smokeless tobacco: Never   Substance Use Topics    Alcohol use: Not Currently    Drug use: No       Review of patient's allergies indicates:   Allergen Reactions    Penicillins Hives        Review of Systems:  Sleeping soundly    OBJECTIVE:     Vital Signs Range (Last  24H):  Temp:  [97.6 °F (36.4 °C)-98.3 °F (36.8 °C)]   Pulse:  [62-83]   Resp:  [10-33]   BP: ()/(50-98)   SpO2:  [90 %-100 %]     Physical Exam:  General- NAD noted  HEENT- WNL  Neck- supple  CV- Regular rate and rhythm  Resp- No increased WOB  GI- Non tender/non-distended  Extrem- No cyanosis, clubbing, edema.  Derm- skin w/d  Neuro-  No flap.     Body mass index is 24.03 kg/m².    Laboratory:  CBC:   Recent Labs   Lab 10/17/23  0439   WBC 6.79   RBC 2.81*   HGB 8.1*   HCT 25.6*   *   MCV 91   MCH 28.8   MCHC 31.6*       CMP:   Recent Labs   Lab 10/17/23  0438 10/17/23  0758   GLU 87  87 132*   CALCIUM 8.2*  8.2* 8.1*   ALBUMIN 2.8*  --    PROT 6.0  --    *  132* 133*   K 4.3  4.3 4.5   CO2 15*  15* 15*     105 105   BUN 35*  35* 34*   CREATININE 6.4*  6.4* 6.5*   ALKPHOS 325*  --    ALT 65*  --    AST 25  --    BILITOT 1.0  --          Diagnostic Results:  Labs: Reviewed      ASSESSMENT/PLAN:     Active Hospital Problems    Diagnosis  POA    *Ketoacidosis [E87.29]  Yes    GERD (gastroesophageal reflux disease) [K21.9]  Yes    Chronic congestive heart failure with left ventricular diastolic dysfunction [I50.32]  Yes    Type 2 diabetes mellitus with hyperglycemia, with long-term current use of insulin, previous HbA1c 5.8% [E11.65, Z79.4]  Not Applicable    Thrombocytopenia [D69.6]  Yes     Chronic    Mild malnutrition [E44.1]  Yes    ESRD (end stage renal disease) [N18.6]  Yes    Seizure disorder [G40.909]  Yes     Chronic      Resolved Hospital Problems   No resolved problems to display.     Imaging Results    None       Patient care was time spent personally by me on the following activities:   Obtaining a history  Examination of patient.  Providing medical care at the patients bedside.  Developing a treatment plan with patient or surrogate and bedside caregivers  Ordering and reviewing laboratory studies, radiographic studies, pulse oximetry.  Ordering and performing treatments  and interventions.  Evaluation of patient's response to treatment.  Discussions with consultants while on the unit and immediately available to the patient.  Re-evaluation of the patient's condition.  Documentation in the medical record.       Thank you for allowing us to participate in the care of your patient. We will follow the patient and provide recommendations as needed.      Time spent seeing patient( greater than 1/2 spent in direct contact) :     Hugh Figueroa MD  Nephrology  [facility] [place]  [phone]

## 2023-10-17 NOTE — PLAN OF CARE
Problem: Adult Inpatient Plan of Care  Goal: Plan of Care Review  Outcome: Ongoing, Progressing     Problem: Adult Inpatient Plan of Care  Goal: Absence of Hospital-Acquired Illness or Injury  Outcome: Ongoing, Progressing     Problem: Adult Inpatient Plan of Care  Goal: Optimal Comfort and Wellbeing  Outcome: Ongoing, Progressing     Problem: Adult Inpatient Plan of Care  Goal: Readiness for Transition of Care  Outcome: Ongoing, Progressing     Problem: Diabetes Comorbidity  Goal: Blood Glucose Level Within Targeted Range  Outcome: Ongoing, Progressing     Problem: Infection  Goal: Absence of Infection Signs and Symptoms  Outcome: Ongoing, Progressing     Problem: Skin Injury Risk Increased  Goal: Skin Health and Integrity  Outcome: Ongoing, Progressing     Problem: Device-Related Complication Risk (Hemodialysis)  Goal: Safe, Effective Therapy Delivery  Outcome: Ongoing, Progressing     Problem: Hemodynamic Instability (Hemodialysis)  Goal: Effective Tissue Perfusion  Outcome: Ongoing, Progressing     Problem: Infection (Hemodialysis)  Goal: Absence of Infection Signs and Symptoms  Outcome: Ongoing, Progressing     Problem: Malnutrition  Goal: Improved Nutritional Intake  Outcome: Ongoing, Progressing

## 2023-10-17 NOTE — PROGRESS NOTES
HD completed       10/17/23 1308   Handoff Report   Received From SHAILA Singh   Given To SHAILA Ervin   Vital Signs   Temp 98.3 °F (36.8 °C)   Pulse 83   Resp 14   BP (!) 153/90   During Hemodialysis Assessment   Blood Flow Rate (mL/min) 350 mL/min   Dialysate Flow Rate (mL/min) 700 ml/min   Ultrafiltration Rate (mL/Hr) 1020 mL/Hr   Arteriovenous Lines Secure Yes   Arterial Pressure (mmHg) -150 mmHg   Venous Pressure (mmHg) 120   UF Removed (mL) 3000 mL   TMP 40   Venous Line in Air Detector Yes   Intake (mL) 250 mL   Transducer Dry Yes   Access Visible Yes    notified of access issue? N/A   Heparin given? N/A   Intra-Hemodialysis Comments HD completed   Post-Hemodialysis Assessment   Rinseback Volume (mL) 250 mL   Blood Volume Processed (Liters) 60.3 L   Dialyzer Clearance Clear   Duration of Treatment 180 minutes   Additional Fluid Intake (mL) 0 mL   Total UF (mL) 3000 mL   Net Fluid Removal 2500   Patient Response to Treatment lamar well   Post-Treatment Weight 57.1 kg (125 lb 14.1 oz)   Treatment Weight Change -2.5   Post-Hemodialysis Comments HD completed report to primary nurse

## 2023-10-17 NOTE — CONSULTS
[facility]/Surg  Adult Nutrition  Consult Note    SUMMARY     Recommendations   1) Change diet to DM 2000 kcal, renal, low fiber diet   2) Add Boost glucose control BID   3) Assist with meal set up   4) weigh s/p HD   5) nutrition education and handout reviewed   6) If pt starts vomiting change diet to sugar free full liquids + Boost glucose control with meals    Goals: 1) PO Intakes > 50% of meals and supplements at f/u  Nutrition Goal Status: new  Communication of RD Recs: reviewed with physician (POC, sticky note)    Assessment and Plan    Endocrine  Mild malnutrition  Malnutrition Type:  Context: acute illness or injury  Level: mild    Related to (etiology):   N/V/D gastroparesis, abd. pain    Signs and Symptoms (as evidenced by):     Malnutrition Characteristic Summary:  Energy Intake (Malnutrition): less than or equal to 75% for greater than or equal to 1 month ( < 50% x > /= 5 days)      Interventions/Recommendations (treatment strategy):  Carb/Na/Phos/K modified diet, nutrition education, nutrition supplement therapy  1) Change diet to DM 2000 kcal, renal, low fiber diet 2) Add Boost glucose control BID 3) Assist with meal set up 4) weigh s/p HD 5) nutrition education and handout reviewed 6) If pt starts vomiting change diet to sugar free full liquids + Boost glucose control with meals    Nutrition Diagnosis Status:   New         Malnutrition Assessment  Malnutrition Context: acute illness or injury  Malnutrition Level: mild  Teeth (Micronutrient): none   Micronutrient Evaluation Summary: no deficiencies   Energy Intake (Malnutrition): less than or equal to 50% for greater than or equal to 5 days  Subcutaneous Fat (Malnutrition): other (see comments) (WDL)  Muscle Mass (Malnutrition): other (see comments) (WDL)                         Reason for Assessment    Reason For Assessment: consult  Diagnosis:  (ketoacidosis)  Relevant Medical History: ESRD on HD, DM2, gastroapresis, GERD, HTN,  "sickle cell trait, CHF, blindness  Interdisciplinary Rounds: did not attend    General Information Comments: 34 y/o female admitted with ketoacidosis s/p N/V/D x a few days with abd. pain. 5th admit for similar in < 1 month. Of note pt with history of gastroparesis. Per MD note pt said she was taking her insulin at home and then said she wasn't. @ visit yesterday pt says she has been taking her novolog and levemir without skipping. Her step mother cooks for her as pt is blind- needs help with tray set up. Pt off the insulin drip today and tolerating small amounts of eggs/grits. I educated pt on diet for gastroparesis and the slow advancement of texture as well as avoiding fiber, provided a handout. Pt says she does not feel nauseous ( has a bad headache x at least 24 hr) and wants to eat solids. Will trial protein shakes and low fiber restriction. NFPE WDL 10/17/23. No significant wt loss per chart review.    Nutrition Discharge Planning: To be determined-DM 2000 kcal, renal, low fiber/fat diet + Sugar free protein shakes as needed ( full liquid + protein shakes if she starts having N/V again)    Nutrition Risk Screen    Nutrition Risk Screen: reduced oral intake over the last month    Nutrition/Diet History    Patient Reported Diet/Restrictions/Preferences: diabetic diet, renal  Spiritual, Cultural Beliefs, Zoroastrianism Practices, Values that Affect Care: no  Food Allergies: NKFA  Factors Affecting Nutritional Intake: decreased appetite, pain, nausea/vomiting    Anthropometrics    Temp: 98 °F (36.7 °C)  Height Method: Stated  Height: 5' 2" (157.5 cm)  Height (inches): 62 in  Weight Method: Bed Scale  Weight: 59.6 kg (131 lb 6.3 oz)  Weight (lb): 131.4 lb  Ideal Body Weight (IBW), Female: 110 lb  % Ideal Body Weight, Female (lb): 119.45 %  BMI (Calculated): 24  BMI Grade: 18.5-24.9 - normal  Weight Loss:  (-107 flux ( wt gain in the past few weeks))       Lab/Procedures/Meds    Pertinent Labs Reviewed: " reviewed  .  BMP  Lab Results   Component Value Date     (L) 10/17/2023    K 4.5 10/17/2023     10/17/2023    CO2 15 (L) 10/17/2023    BUN 34 (H) 10/17/2023    CREATININE 6.5 (H) 10/17/2023    CALCIUM 8.1 (L) 10/17/2023    ANIONGAP 13 10/17/2023    EGFRNORACEVR 8 (A) 10/17/2023     Recent Labs   Lab 10/17/23  0930   POCTGLUCOSE 149*     Lab Results   Component Value Date    HGBA1C 5.8 08/01/2023     Lab Results   Component Value Date    CALCIUM 8.1 (L) 10/17/2023    PHOS 4.5 10/16/2023     Lab Results   Component Value Date    ALBUMIN 2.8 (L) 10/17/2023     BNP  Recent Labs   Lab 10/13/23  0851   BNP 3,757*       Pertinent Medications Reviewed: reviewed  Scheduled Meds:   amLODIPine  5 mg Oral Daily    apixaban  5 mg Oral BID    carvediloL  25 mg Oral BID    cetirizine  10 mg Oral Daily    ciprofloxacin HCl  0.5 inch Right Eye Q6H    epoetin teresa (PROCRIT) injection  50 Units/kg Intravenous Every Tues, Thurs, Sat    FLUoxetine  20 mg Oral Daily    fluticasone propionate  1 spray Each Nostril Daily    gabapentin  600 mg Oral BID    hydrALAZINE  100 mg Oral BID    insulin detemir U-100  9 Units Subcutaneous BID    isosorbide mononitrate  30 mg Oral Daily    ketorolac 0.5%  1 drop Right Eye TID    levETIRAcetam  500 mg Oral BID    mupirocin   Nasal BID    pantoprazole  40 mg Oral Daily    prednisoLONE acetate  1 drop Right Eye QID    senna-docusate 8.6-50 mg  1 tablet Oral BID    timolol maleate 0.5%  1 drop Right Eye BID     Continuous Infusions:   dextrose 5 % and 0.45 % NaCl 75 mL/hr at 10/17/23 0219    insulin regular 1 units/mL infusion orderable (DKA) 0.01 Units/kg/hr (10/17/23 0500)     PRN Meds:.acetaminophen, aluminum-magnesium hydroxide-simethicone, butalbital-acetaminophen-caffeine -40 mg, dextrose 10 % in water (D10W), dextrose 10 % in water (D10W), dextrose 10%, dextrose 10%, dextrose 10%, dextrose 10%, dextrose 10%, dextrose 10%, dextrose 5 % and 0.45 % NaCl,  glucagon (human recombinant), glucagon (human recombinant), glucose, glucose, glucose, glucose, insulin aspart U-100, melatonin, morphine, naloxone, ondansetron, polyethylene glycol, sodium chloride 0.9%, sodium chloride 0.9%    Estimated/Assessed Needs    Weight Used For Calorie Calculations: 59.6 kg (131 lb 6.3 oz)  Energy Calorie Requirements (kcal): 35 kcal/kg ( HD) = 2085 kcal  Energy Need Method: Kcal/kg  Protein Requirements: 1.2 g protein/kg ( 71 g)  Weight Used For Protein Calculations: 59.6 kg (131 lb 6.3 oz)  Fluid Requirements (mL): 1500 ml or per MD ( HD)  Estimated Fluid Requirement Method: other (see comments)  RDA Method (mL): 35  CHO Requirement: 234-286 g      Nutrition Prescription Ordered    Current Diet Order: DM 2000 kcal, renal, bland diet    Evaluation of Received Nutrient/Fluid Intake    Energy Calories Required: not meeting needs  Protein Required: not meeting needs  Fluid Required: exceeding needs  Tolerance: tolerating     Intake/Output Summary (Last 24 hours) at 10/17/2023 1027  Last data filed at 10/17/2023 0216  Gross per 24 hour   Intake 1295 ml   Output --   Net 1295 ml     IVF @ 75 ml/hr  % Intake of Estimated Energy Needs: 25-50% with D5  % Meal Intake: 25-50%    Nutrition Risk    Level of Risk/Frequency of Follow-up:  (2 x weekly)       Monitor and Evaluation    Food and Nutrient Intake: energy intake, food and beverage intake  Food and Nutrient Adminstration: diet order  Knowledge/Beliefs/Attitudes: food and nutrition knowledge/skill  Physical Activity and Function: nutrition-related ADLs and IADLs  Anthropometric Measurements: weight  Biochemical Data, Medical Tests and Procedures: electrolyte and renal panel, gastrointestinal profile, glucose/endocrine profile  Nutrition-Focused Physical Findings: overall appearance       Nutrition Follow-Up    RD Follow-up?: Yes

## 2023-10-17 NOTE — EICU
Intervention Initiated From:  Bedside    Roxann intervened regarding:  Rounding (Video assessment)    Nurse Notified:  No    Doctor Notified:  No    Comments: Video rounds complete. Patient resting in bed with no alarms, or distress noted. Bedside nurse with patient and denies EICU/RICU needs at this time.

## 2023-10-17 NOTE — SUBJECTIVE & OBJECTIVE
Interval History:  Patient seen and examined.  Acidosis improving.  Will transition off of insulin drip today.  Continues to have headache.  Will try Fioricet    Review of Systems   Respiratory:  Negative for shortness of breath.    Gastrointestinal:  Negative for abdominal pain.   Neurological:  Positive for headaches.     Objective:     Vital Signs (Most Recent):  Temp: 98 °F (36.7 °C) (10/17/23 0950)  Pulse: 75 (10/17/23 1030)  Resp: 20 (10/17/23 1030)  BP: 108/68 (10/17/23 1030)  SpO2: 96 % (10/17/23 0950) Vital Signs (24h Range):  Temp:  [97.4 °F (36.3 °C)-98.2 °F (36.8 °C)] 98 °F (36.7 °C)  Pulse:  [62-76] 75  Resp:  [10-33] 20  SpO2:  [90 %-100 %] 96 %  BP: ()/(50-78) 108/68     Weight: 59.6 kg (131 lb 6.3 oz)  Body mass index is 24.03 kg/m².    Intake/Output Summary (Last 24 hours) at 10/17/2023 1034  Last data filed at 10/17/2023 0216  Gross per 24 hour   Intake 1295 ml   Output --   Net 1295 ml           Physical Exam  Constitutional:       General: She is not in acute distress.     Appearance: She is well-developed. She is ill-appearing.   HENT:      Head: Normocephalic and atraumatic.   Eyes:      Pupils: Pupils are equal, round, and reactive to light.   Cardiovascular:      Rate and Rhythm: Normal rate and regular rhythm.      Heart sounds: No murmur heard.  Pulmonary:      Effort: Pulmonary effort is normal. No respiratory distress.      Breath sounds: Normal breath sounds. No wheezing or rales.   Abdominal:      General: Bowel sounds are normal. There is no distension.      Palpations: Abdomen is soft.      Tenderness: There is no abdominal tenderness.   Musculoskeletal:         General: Normal range of motion.   Skin:     General: Skin is warm and dry.      Findings: No rash.   Neurological:      Mental Status: She is alert and oriented to person, place, and time.      Cranial Nerves: No cranial nerve deficit.   Psychiatric:         Behavior: Behavior normal.             Significant Labs: All  pertinent labs within the past 24 hours have been reviewed.    Significant Imaging: I have reviewed all pertinent imaging results/findings within the past 24 hours.

## 2023-10-17 NOTE — CARE UPDATE
10/16/23 1927   Patient Assessment/Suction   Level of Consciousness (AVPU) alert   Respiratory Effort Unlabored   PRE-TX-O2   Device (Oxygen Therapy) nasal cannula   $ Is the patient on Low Flow Oxygen? Yes   Flow (L/min) 2   SpO2 98 %   Pulse Oximetry Type Continuous   $ Pulse Oximetry - Multiple Charge Pulse Oximetry - Multiple   Pulse 64   Resp 14   /70

## 2023-10-17 NOTE — ASSESSMENT & PLAN NOTE
Malnutrition Type:  Context: acute illness or injury  Level: mild    Related to (etiology):   N/V/D gastroparesis, abd. pain    Signs and Symptoms (as evidenced by):     Malnutrition Characteristic Summary:  Energy Intake (Malnutrition): less than or equal to 75% for greater than or equal to 1 month ( < 50% x > /= 5 days)      Interventions/Recommendations (treatment strategy):  Carb/Na/Phos/K modified diet, nutrition education, nutrition supplement therapy  1) Change diet to DM 2000 kcal, renal, low fiber diet 2) Add Boost glucose control BID 3) Assist with meal set up 4) weigh s/p HD 5) nutrition education and handout reviewed 6) If pt starts vomiting change diet to sugar free full liquids + Boost glucose control with meals    Nutrition Diagnosis Status:   New

## 2023-10-18 VITALS
WEIGHT: 136.25 LBS | TEMPERATURE: 98 F | OXYGEN SATURATION: 98 % | DIASTOLIC BLOOD PRESSURE: 91 MMHG | HEART RATE: 80 BPM | HEIGHT: 62 IN | SYSTOLIC BLOOD PRESSURE: 140 MMHG | RESPIRATION RATE: 20 BRPM | BODY MASS INDEX: 25.07 KG/M2

## 2023-10-18 LAB
ALBUMIN SERPL BCP-MCNC: 2.7 G/DL (ref 3.5–5.2)
ALP SERPL-CCNC: 300 U/L (ref 55–135)
ALT SERPL W/O P-5'-P-CCNC: 54 U/L (ref 10–44)
ANION GAP SERPL CALC-SCNC: 10 MMOL/L (ref 8–16)
ANION GAP SERPL CALC-SCNC: 10 MMOL/L (ref 8–16)
ANION GAP SERPL CALC-SCNC: 13 MMOL/L (ref 8–16)
ANION GAP SERPL CALC-SCNC: 9 MMOL/L (ref 8–16)
ANION GAP SERPL CALC-SCNC: 9 MMOL/L (ref 8–16)
AST SERPL-CCNC: 26 U/L (ref 10–40)
BASOPHILS # BLD AUTO: 0.03 K/UL (ref 0–0.2)
BASOPHILS NFR BLD: 0.7 % (ref 0–1.9)
BILIRUB SERPL-MCNC: 1.2 MG/DL (ref 0.1–1)
BUN SERPL-MCNC: 19 MG/DL (ref 6–20)
BUN SERPL-MCNC: 23 MG/DL (ref 6–20)
BUN SERPL-MCNC: 23 MG/DL (ref 6–20)
BUN SERPL-MCNC: 27 MG/DL (ref 6–20)
BUN SERPL-MCNC: 30 MG/DL (ref 6–20)
CALCIUM SERPL-MCNC: 7.7 MG/DL (ref 8.7–10.5)
CALCIUM SERPL-MCNC: 7.7 MG/DL (ref 8.7–10.5)
CALCIUM SERPL-MCNC: 7.8 MG/DL (ref 8.7–10.5)
CALCIUM SERPL-MCNC: 7.8 MG/DL (ref 8.7–10.5)
CALCIUM SERPL-MCNC: 8 MG/DL (ref 8.7–10.5)
CHLORIDE SERPL-SCNC: 99 MMOL/L (ref 95–110)
CO2 SERPL-SCNC: 23 MMOL/L (ref 23–29)
CO2 SERPL-SCNC: 26 MMOL/L (ref 23–29)
CREAT SERPL-MCNC: 4.4 MG/DL (ref 0.5–1.4)
CREAT SERPL-MCNC: 4.7 MG/DL (ref 0.5–1.4)
CREAT SERPL-MCNC: 4.7 MG/DL (ref 0.5–1.4)
CREAT SERPL-MCNC: 4.9 MG/DL (ref 0.5–1.4)
CREAT SERPL-MCNC: 5.1 MG/DL (ref 0.5–1.4)
DIFFERENTIAL METHOD: ABNORMAL
EOSINOPHIL # BLD AUTO: 0.3 K/UL (ref 0–0.5)
EOSINOPHIL NFR BLD: 6.4 % (ref 0–8)
ERYTHROCYTE [DISTWIDTH] IN BLOOD BY AUTOMATED COUNT: 19.5 % (ref 11.5–14.5)
EST. GFR  (NO RACE VARIABLE): 11 ML/MIN/1.73 M^2
EST. GFR  (NO RACE VARIABLE): 11 ML/MIN/1.73 M^2
EST. GFR  (NO RACE VARIABLE): 12 ML/MIN/1.73 M^2
EST. GFR  (NO RACE VARIABLE): 12 ML/MIN/1.73 M^2
EST. GFR  (NO RACE VARIABLE): 13 ML/MIN/1.73 M^2
GLUCOSE SERPL-MCNC: 181 MG/DL (ref 70–110)
GLUCOSE SERPL-MCNC: 190 MG/DL (ref 70–110)
GLUCOSE SERPL-MCNC: 199 MG/DL (ref 70–110)
GLUCOSE SERPL-MCNC: 199 MG/DL (ref 70–110)
GLUCOSE SERPL-MCNC: 207 MG/DL (ref 70–110)
HCT VFR BLD AUTO: 21.5 % (ref 37–48.5)
HGB BLD-MCNC: 7.1 G/DL (ref 12–16)
IMM GRANULOCYTES # BLD AUTO: 0.02 K/UL (ref 0–0.04)
IMM GRANULOCYTES NFR BLD AUTO: 0.5 % (ref 0–0.5)
LYMPHOCYTES # BLD AUTO: 0.8 K/UL (ref 1–4.8)
LYMPHOCYTES NFR BLD: 19.4 % (ref 18–48)
MAGNESIUM SERPL-MCNC: 2 MG/DL (ref 1.6–2.6)
MCH RBC QN AUTO: 29.7 PG (ref 27–31)
MCHC RBC AUTO-ENTMCNC: 33 G/DL (ref 32–36)
MCV RBC AUTO: 90 FL (ref 82–98)
MONOCYTES # BLD AUTO: 0.5 K/UL (ref 0.3–1)
MONOCYTES NFR BLD: 12.1 % (ref 4–15)
NEUTROPHILS # BLD AUTO: 2.6 K/UL (ref 1.8–7.7)
NEUTROPHILS NFR BLD: 60.9 % (ref 38–73)
NRBC BLD-RTO: 1 /100 WBC
PLATELET # BLD AUTO: 107 K/UL (ref 150–450)
PMV BLD AUTO: 9 FL (ref 9.2–12.9)
POCT GLUCOSE: 205 MG/DL (ref 70–110)
POCT GLUCOSE: 216 MG/DL (ref 70–110)
POTASSIUM SERPL-SCNC: 4.3 MMOL/L (ref 3.5–5.1)
POTASSIUM SERPL-SCNC: 4.4 MMOL/L (ref 3.5–5.1)
POTASSIUM SERPL-SCNC: 4.4 MMOL/L (ref 3.5–5.1)
PROT SERPL-MCNC: 5.9 G/DL (ref 6–8.4)
RBC # BLD AUTO: 2.39 M/UL (ref 4–5.4)
SODIUM SERPL-SCNC: 134 MMOL/L (ref 136–145)
SODIUM SERPL-SCNC: 134 MMOL/L (ref 136–145)
SODIUM SERPL-SCNC: 135 MMOL/L (ref 136–145)
WBC # BLD AUTO: 4.22 K/UL (ref 3.9–12.7)

## 2023-10-18 PROCEDURE — 80048 BASIC METABOLIC PNL TOTAL CA: CPT | Performed by: NURSE PRACTITIONER

## 2023-10-18 PROCEDURE — 80053 COMPREHEN METABOLIC PANEL: CPT | Performed by: NURSE PRACTITIONER

## 2023-10-18 PROCEDURE — 99900035 HC TECH TIME PER 15 MIN (STAT)

## 2023-10-18 PROCEDURE — 83735 ASSAY OF MAGNESIUM: CPT | Performed by: NURSE PRACTITIONER

## 2023-10-18 PROCEDURE — 80048 BASIC METABOLIC PNL TOTAL CA: CPT | Mod: 91 | Performed by: NURSE PRACTITIONER

## 2023-10-18 PROCEDURE — 94761 N-INVAS EAR/PLS OXIMETRY MLT: CPT

## 2023-10-18 PROCEDURE — 63600175 PHARM REV CODE 636 W HCPCS: Performed by: NURSE PRACTITIONER

## 2023-10-18 PROCEDURE — 97161 PT EVAL LOW COMPLEX 20 MIN: CPT

## 2023-10-18 PROCEDURE — 85025 COMPLETE CBC W/AUTO DIFF WBC: CPT | Performed by: NURSE PRACTITIONER

## 2023-10-18 PROCEDURE — 36415 COLL VENOUS BLD VENIPUNCTURE: CPT | Performed by: NURSE PRACTITIONER

## 2023-10-18 PROCEDURE — 25000003 PHARM REV CODE 250: Performed by: NURSE PRACTITIONER

## 2023-10-18 PROCEDURE — G0378 HOSPITAL OBSERVATION PER HR: HCPCS

## 2023-10-18 RX ADMIN — APIXABAN 5 MG: 2.5 TABLET, FILM COATED ORAL at 08:10

## 2023-10-18 RX ADMIN — ISOSORBIDE MONONITRATE 30 MG: 30 TABLET, EXTENDED RELEASE ORAL at 08:10

## 2023-10-18 RX ADMIN — CIPROFLOXACIN HYDROCHLORIDE 0.5 INCH: 3 OINTMENT OPHTHALMIC at 04:10

## 2023-10-18 RX ADMIN — MORPHINE SULFATE 2 MG: 2 INJECTION, SOLUTION INTRAMUSCULAR; INTRAVENOUS at 05:10

## 2023-10-18 RX ADMIN — PREDNISOLONE ACETATE 1 DROP: 10 SUSPENSION/ DROPS OPHTHALMIC at 11:10

## 2023-10-18 RX ADMIN — LEVETIRACETAM 500 MG: 500 TABLET, FILM COATED ORAL at 08:10

## 2023-10-18 RX ADMIN — HYDRALAZINE HYDROCHLORIDE 100 MG: 25 TABLET, FILM COATED ORAL at 08:10

## 2023-10-18 RX ADMIN — INSULIN DETEMIR 9 UNITS: 100 INJECTION, SOLUTION SUBCUTANEOUS at 08:10

## 2023-10-18 RX ADMIN — MORPHINE SULFATE 2 MG: 2 INJECTION, SOLUTION INTRAMUSCULAR; INTRAVENOUS at 12:10

## 2023-10-18 RX ADMIN — CIPROFLOXACIN HYDROCHLORIDE 0.5 INCH: 3 OINTMENT OPHTHALMIC at 10:10

## 2023-10-18 RX ADMIN — PREDNISOLONE ACETATE 1 DROP: 10 SUSPENSION/ DROPS OPHTHALMIC at 12:10

## 2023-10-18 RX ADMIN — CETIRIZINE HYDROCHLORIDE 10 MG: 10 TABLET, FILM COATED ORAL at 08:10

## 2023-10-18 RX ADMIN — FLUOXETINE 20 MG: 20 CAPSULE ORAL at 08:10

## 2023-10-18 RX ADMIN — DOCUSATE SODIUM AND SENNOSIDES 1 TABLET: 8.6; 5 TABLET, FILM COATED ORAL at 08:10

## 2023-10-18 RX ADMIN — KETOROLAC TROMETHAMINE 1 DROP: 5 SOLUTION/ DROPS OPHTHALMIC at 08:10

## 2023-10-18 RX ADMIN — CARVEDILOL 25 MG: 25 TABLET, FILM COATED ORAL at 08:10

## 2023-10-18 RX ADMIN — GABAPENTIN 600 MG: 300 CAPSULE ORAL at 08:10

## 2023-10-18 RX ADMIN — PANTOPRAZOLE SODIUM 40 MG: 40 TABLET, DELAYED RELEASE ORAL at 08:10

## 2023-10-18 RX ADMIN — MUPIROCIN: 20 OINTMENT TOPICAL at 08:10

## 2023-10-18 RX ADMIN — INSULIN ASPART 4 UNITS: 100 INJECTION, SOLUTION INTRAVENOUS; SUBCUTANEOUS at 08:10

## 2023-10-18 RX ADMIN — TIMOLOL MALEATE 1 DROP: 5 SOLUTION/ DROPS OPHTHALMIC at 08:10

## 2023-10-18 RX ADMIN — AMLODIPINE BESYLATE 5 MG: 5 TABLET ORAL at 08:10

## 2023-10-18 RX ADMIN — INSULIN ASPART 4 UNITS: 100 INJECTION, SOLUTION INTRAVENOUS; SUBCUTANEOUS at 11:10

## 2023-10-18 NOTE — PLAN OF CARE
Pt appears to be resting, eyes are closed, breaths are deep and even. VSS, NAD noted at this time. Discussed PoC with pt, stated they understood and would help as able. c/o pain handled with PRN meds as ordered, will continue to monitor for duration of shift.

## 2023-10-18 NOTE — PT/OT/SLP EVAL
Physical Therapy Evaluation and Discharge Note    Patient Name:  Tabby Howard   MRN:  [id number]    Recommendations:     Discharge Recommendations:    Discharge Equipment Recommendations: none   Barriers to discharge: None    Assessment:     Tabby Howard is a 33 y.o. female admitted with a medical diagnosis of Ketoacidosis. .  At this time, patient is functioning at their prior level of function and does not require further acute PT services. Patient's only mobility issue due to visual deficits.    Recent Surgery: * No surgery found *      Plan:     During this hospitalization, patient does not require further acute PT services.  Please re-consult if situation changes.      Subjective     Chief Complaint: fatigue  Patient/Family Comments/goals: go home  Pain/Comfort:       Patients cultural, spiritual, Baptism conflicts given the current situation:      Living Environment:  Currently lives with 3 teenage daughters in a 1 story home.  Prior to admission, patients level of function was modified independent at household level.  Equipment used at home: none.  DME owned (not currently used): none.  Upon discharge, patient will have assistance from family.    Objective:     Communicated with nurse prior to session.  Patient found supine with bed alarm upon PT entry to room.    General Precautions: Standard, fall, blind    Orthopedic Precautions:N/A   Braces:    Respiratory Status: Room air    Exams:  RLE ROM: WFL  RLE Strength: WNL  LLE ROM: WFL  LLE Strength: WNL    Functional Mobility:  Bed Mobility:     Supine to Sit: independence  Sit to Supine: independence  Transfers:     Sit to Stand:  stand by assistance with no AD  Gait: x 200 feet hand held assistance for directions    AM-PAC 6 CLICK MOBILITY  Total Score:        Treatment and Education:  None given    AM-PAC 6 CLICK MOBILITY  Total Score:      Patient left supine with call button in reach, bed alarm on, and nurse notified.    GOALS:   Multidisciplinary  Problems       Physical Therapy Goals       Not on file                    History:     Past Medical History:   Diagnosis Date    ESRD on hemodialysis 2022    Gastritis 2022    EGD was 21    Gastroparesis 2022    has not had Emptying study    Heart failure with preserved ejection fraction 2022    EF 55% on 3/22    History of supraventricular tachycardia     Hyperkalemia 2022    Hypertensive emergency 2022    Sickle cell trait 2022    Type 2 diabetes mellitus        Past Surgical History:   Procedure Laterality Date     SECTION      x 3    COLONOSCOPY      COLONOSCOPY N/A 2022    Procedure: COLONOSCOPY;  Surgeon: Jagdeep Cedeno MD;  Location: [facility];  Service: Endoscopy;  Laterality: N/A;    ESOPHAGOGASTRODUODENOSCOPY N/A 10/18/2019    Procedure: ESOPHAGOGASTRODUODENOSCOPY (EGD);  Surgeon: Gianluca Mendez MD;  Location: [facility];  Service: Endoscopy;  Laterality: N/A;    ESOPHAGOGASTRODUODENOSCOPY N/A 2022    Procedure: EGD (ESOPHAGOGASTRODUODENOSCOPY);  Surgeon: Micky Paredes III, MD;  Location: [facility];  Service: Endoscopy;  Laterality: N/A;    ESOPHAGOGASTRODUODENOSCOPY N/A 2022    Procedure: EGD (ESOPHAGOGASTRODUODENOSCOPY);  Surgeon: Marcelo Zhong MD;  Location: [facility];  Service: Endoscopy;  Laterality: N/A;    INSERTION, CATHETER, TUNNELED N/A 2023    Procedure: Insertion,catheter,tunneled;  Surgeon: Carlos Thurman Jr., MD;  Location: [facility];  Service: General;  Laterality: N/A;    LAPAROSCOPIC CHOLECYSTECTOMY N/A 2022    Procedure: CHOLECYSTECTOMY, LAPAROSCOPIC;  Surgeon: Grey Perez MD;  Location: [facility];  Service: General;  Laterality: N/A;    PLACEMENT OF DUAL-LUMEN VASCULAR CATHETER Left 2022    Procedure: INSERTION-CATHETER-JOSEPH;  Surgeon: Dionte Gan MD;  Location: [facility];  Service: General;  Laterality: Left;    PLACEMENT OF DUAL-LUMEN VASCULAR CATHETER Right 2022     Procedure: INSERTION-CATHETER-Hemosplit;  Surgeon: Dionte Gan MD;  Location: [facility];  Service: General;  Laterality: Right;       Time Tracking:     PT Received On: 10/18/23  PT Start Time: 1044     PT Stop Time: 1101  PT Total Time (min): 17 min     Billable Minutes: Evaluation 17      10/18/2023

## 2023-10-18 NOTE — PLAN OF CARE
Pt clear for DC from CM standpoint. Discharging home.     Already scheduled with PCP for 10/20     10/18/23 1024   Final Note   Assessment Type Final Discharge Note   Anticipated Discharge Disposition Home   Hospital Resources/Appts/Education Provided Provided patient/caregiver with written discharge plan information

## 2023-10-18 NOTE — PLAN OF CARE
CM spoke with [employer] [phone] to schedule ride home from hospital. Confirmation #[id number]. They state it may take up to 3 hours for ride to arrive but if it is more than 3 hours, to call them.

## 2023-10-18 NOTE — EICU
Intervention Initiated From:  COR / [facility]    Roxann intervened regarding:  Rounding (Video assessment)    Pt resting quietly.  Sats 92% on RA, HR 80, resp 13, 148/86(111) w/ NAD noted

## 2023-10-18 NOTE — CARE UPDATE
10/17/23 1939   Patient Assessment/Suction   Level of Consciousness (AVPU) alert   Respiratory Effort Unlabored   PRE-TX-O2   Device (Oxygen Therapy) room air   SpO2 98 %   Pulse Oximetry Type Continuous   $ Pulse Oximetry - Multiple Charge Pulse Oximetry - Multiple   Pulse 81   Resp (!) 23

## 2023-10-19 ENCOUNTER — PATIENT OUTREACH (OUTPATIENT)
Dept: ADMINISTRATIVE | Facility: CLINIC | Age: 33
End: 2023-10-19
Payer: MEDICAID

## 2023-10-19 NOTE — HOSPITAL COURSE
Patient was admitted to the hospital medicine service with diabetic ketoacidosis.  She was placed on insulin infusion.  We had issues keeping glucose high enough to be able to tolerate the insulin even with D5 containing fluids.  She had nausea and vomiting which improved with control of her acidosis.  Patient also had intermittent hypotension.  Nephrology followed for HD during hospital stay.  After days on the insulin drip patient was able to transition off.  She was tolerating p.o. diet.  She was discharged home in good condition.

## 2023-10-19 NOTE — DISCHARGE SUMMARY
[facility] Medicine  Discharge Summary      Patient Name: Tabby Howard  MRN: [id number]  UNIQUE: [id number]  Patient Class: IP- Inpatient  Admission Date: 10/15/2023  Hospital Length of Stay: 3 days  Discharge Date and Time: 10/18/2023 12:03 PM  Attending Physician: No att. providers found   Discharging Provider: Linda Cueto MD  Primary Care Provider: Melony Kim NP    Primary Care Team: Networked reference to record PCT     HPI:    Patient is a 33-year-old female with ESRD on HD, history of DVT, gastroparesis, and type 2 diabetes who presents with abdominal pain. It is severe. It is associated with anorexia and N/V. She denies fever, chills, chest pain, SOB. She was discharged from [facility] on 10/14 after and admission for missed HD an abd pain. She was dialyzed and discharged home. US abd 2 days ago was negative for acute findings. She has recurrent abd pain and this is her 5th admission in 1 month. She initially states she was taking her insulin at home then states she did not take it. In the ED, bicarb 13, anion gap 25, glucose 180 and BHB 5.9, pH 7.23. Hospital medicine is consulted for admission for further work up and treatment.       * No surgery found *      Hospital Course:   Patient was admitted to the hospital medicine service with diabetic ketoacidosis.  She was placed on insulin infusion.  We had issues keeping glucose high enough to be able to tolerate the insulin even with D5 containing fluids.  She had nausea and vomiting which improved with control of her acidosis.  Patient also had intermittent hypotension.  Nephrology followed for HD during hospital stay.  After days on the insulin drip patient was able to transition off.  She was tolerating p.o. diet.  She was discharged home in good condition.       Goals of Care Treatment Preferences:  Code Status: Full Code    Health care agent: Step-Mother Tanya Howard / Father Garcia Howard  Health care agent number: [phone] / [phone]                   Consults:   Consults (From admission, onward)        Status Ordering Provider     Inpatient consult to Registered Dietitian/Nutritionist  Once        Provider:  (Not yet assigned)    EVERTON Mcconnell     Inpatient consult to Nephrology  Once        Provider:  Prateek Clark MD    Completed JANELLE BENOIT          No new Assessment & Plan notes have been filed under this hospital service since the last note was generated.  Service: Hospital Medicine    Final Active Diagnoses:    Diagnosis Date Noted POA    PRINCIPAL PROBLEM:  Ketoacidosis [E87.29] 04/23/2021 Yes    GERD (gastroesophageal reflux disease) [K21.9] 08/18/2023 Yes    Chronic congestive heart failure with left ventricular diastolic dysfunction [I50.32] 03/02/2023 Yes    Type 2 diabetes mellitus with hyperglycemia, with long-term current use of insulin, previous HbA1c 5.8% [E11.65, Z79.4] 01/27/2023 Not Applicable    Thrombocytopenia [D69.6] 10/26/2022 Yes     Chronic    Mild malnutrition [E44.1] 10/05/2022 Yes    ESRD (end stage renal disease) [N18.6] 07/22/2022 Yes    Seizure disorder [G40.909] 05/29/2022 Yes     Chronic      Problems Resolved During this Admission:       Discharged Condition: good    Disposition: Home or Self Care    Follow Up:   Follow-up Information     Melony Kim NP. Go on 10/20/2023.    Specialty: Nurse Practitioner  Why: hospital follow up at 11:00  Contact information:  [address] [facility] [address]  [phone]                       Patient Instructions:      Notify your health care provider if you experience any of the following:  temperature >100.4     Notify your health care provider if you experience any of the following:  persistent nausea and vomiting or diarrhea     Notify your health care provider if you experience any of the following:  severe uncontrolled pain     Notify your health care provider if you experience any of the following:   "redness, tenderness, or signs of infection (pain, swelling, redness, odor or green/yellow discharge around incision site)     Notify your health care provider if you experience any of the following:  difficulty breathing or increased cough     Notify your health care provider if you experience any of the following:  severe persistent headache     Notify your health care provider if you experience any of the following:  worsening rash     Notify your health care provider if you experience any of the following:  persistent dizziness, light-headedness, or visual disturbances     Notify your health care provider if you experience any of the following:  increased confusion or weakness     Activity as tolerated       Significant Diagnostic Studies: Labs:   BMP:   Recent Labs   Lab 10/18/23  0515 10/18/23  0757 10/18/23  1149   *  199* 207* 190*   *  134* 135* 135*   K 4.3  4.3 4.4 4.3   CL 99  99 99 99   CO2 26  26 26 23   BUN 23*  23* 27* 30*   CREATININE 4.7*  4.7* 4.9* 5.1*   CALCIUM 7.7*  7.7* 7.8* 8.0*   MG 2.0  --   --     and CBC   Recent Labs   Lab 10/18/23  0515   WBC 4.22   HGB 7.1*   HCT 21.5*   *       Pending Diagnostic Studies:     None         Medications:  Reconciled Home Medications:      Medication List      CONTINUE taking these medications    amLODIPine 5 MG tablet  Commonly known as: NORVASC  Take 1 tablet (5 mg total) by mouth once daily.     BD ULTRA-FINE MINI PEN NEEDLE 31 gauge x 3/16" Ndle  Generic drug: pen needle, diabetic  Use as directed to inject insulin 5 times daily     carvediloL 25 MG tablet  Commonly known as: COREG  Take 1 tablet (25 mg total) by mouth 2 (two) times daily.     cetirizine 10 MG tablet  Commonly known as: ZYRTEC  Take 1 tablet every day by oral route.     ciprofloxacin HCl 0.3 % ophthalmic solution  Commonly known as: CILOXAN  instill 1 drop into the right eye 4 times a day for 30 days     ELIQUIS 5 mg Tab  Generic drug: apixaban  Take 1 " tablet (5 mg total) by mouth 2 (two) times daily.     FLUoxetine 20 MG capsule  Take 1 capsule (20 mg total) by mouth once daily.     fluticasone propionate 50 mcg/actuation nasal spray  Commonly known as: FLONASE  Spray 1 spray every day by intranasal route.     gabapentin 300 MG capsule  Commonly known as: NEURONTIN  Take 2 capsules twice a day by oral route for 90 days.     hydrALAZINE 100 MG tablet  Commonly known as: APRESOLINE  Take 1 tablet (100 mg total) by mouth 2 (two) times a day.     insulin aspart U-100 100 unit/mL (3 mL) Inpn pen  Commonly known as: NovoLOG FlexPen U-100 Insulin  Inject 8 units 3 times a day by subcutaneous route before meals     isosorbide mononitrate 30 MG 24 hr tablet  Commonly known as: IMDUR  Take 1 tablet (30 mg total) by mouth once daily.     ketorolac 0.5% 0.5 % Drop  Commonly known as: ACULAR  Instill 1 drop into the right eye 4 times a day for 30 days     * lancets 33 gauge Misc  Use to test twice daily     * TRUEPLUS LANCETS 33 gauge Misc  Generic drug: lancets  Use 1 to check blood glucose three times daily     * LEVEMIR FLEXTOUCH U100 INSULIN 100 unit/mL (3 mL) Inpn pen  Generic drug: insulin detemir U-100 (Levemir)  Inject 18 units every day by subcutaneous route in the evening     * LEVEMIR FLEXPEN 100 unit/mL (3 mL) Inpn pen  Generic drug: insulin detemir U-100 (Levemir)  Inject 21 units every day by subcutaneous route in the evening for 90 days.     levETIRAcetam 500 MG Tab  Commonly known as: KEPPRA  Take 1 tablet twice a day by oral route for 30 days.     ondansetron 4 MG Tbdl  Commonly known as: ZOFRAN-ODT  Place 1 tablet (4 mg total) under the tongue every 8 (eight) hours.     pantoprazole 40 MG tablet  Commonly known as: PROTONIX  Take 1 tablet (40 mg total) by mouth once daily.     prednisoLONE acetate 1 % Drps  Commonly known as: PRED FORTE  Instill 1 drops into the right eye 4 times a day for 30 days     promethazine 25 MG suppository  Commonly known as:  PHENERGAN  Place 1 suppository (25 mg total) rectally every 6 (six) hours as needed for Nausea.     timolol maleate 0.5% 0.5 % Drop  Commonly known as: TIMOPTIC  Instill 1 drop into the right eye 2 times a day for 30 days     TRUE METRIX GLUCOSE METER Misc  Generic drug: blood-glucose meter  Use to check blood glucose     TRUE METRIX GLUCOSE TEST STRIP Strp  Generic drug: blood sugar diagnostic  Use 1 strip to check blood glucose three times daily     VELPHORO 500 mg Chew  Generic drug: sucroferric oxyhydroxide  Take 1 tablet 3 times a day         * This list has 4 medication(s) that are the same as other medications prescribed for you. Read the directions carefully, and ask your doctor or other care provider to review them with you.                Indwelling Lines/Drains at time of discharge:   Lines/Drains/Airways     Central Venous Catheter Line  Duration                Hemodialysis Catheter 06/17/23 1135 right internal jugular 124 days                Time spent on the discharge of patient: 35 minutes         Linda Cueto MD  Department of Hospital Medicine  [facility]/Surg

## 2023-10-19 NOTE — PROGRESS NOTES
C3 nurse attempted to contact Tabby Howard  for a TCC post hospital discharge follow up call. Not available.The patient does not have a scheduled HOSFU appointment. Message sent to PCP staff for assistance with scheduling visit with patient.

## 2023-10-20 NOTE — PROGRESS NOTES
2nd Attempt made to reach patient for TCC call. The patient is unable to conduct the call @ this time. The patient requested a callback.

## 2023-10-26 ENCOUNTER — APPOINTMENT (OUTPATIENT)
Dept: LAB | Facility: HOSPITAL | Age: 33
End: 2023-10-26
Payer: MEDICAID

## 2023-10-26 DIAGNOSIS — D46.9 MDS (MYELODYSPLASTIC SYNDROME): Primary | ICD-10-CM

## 2023-10-26 PROCEDURE — 86920 COMPATIBILITY TEST SPIN: CPT | Performed by: NURSE PRACTITIONER

## 2023-10-26 PROCEDURE — 86901 BLOOD TYPING SEROLOGIC RH(D): CPT | Mod: 91 | Performed by: NURSE PRACTITIONER

## 2023-10-27 ENCOUNTER — INFUSION (OUTPATIENT)
Dept: INFUSION THERAPY | Facility: HOSPITAL | Age: 33
End: 2023-10-27
Attending: NURSE PRACTITIONER
Payer: MEDICAID

## 2023-10-27 VITALS
SYSTOLIC BLOOD PRESSURE: 137 MMHG | RESPIRATION RATE: 16 BRPM | TEMPERATURE: 99 F | DIASTOLIC BLOOD PRESSURE: 80 MMHG | OXYGEN SATURATION: 97 % | HEART RATE: 88 BPM

## 2023-10-27 DIAGNOSIS — D64.9 ANEMIA, UNSPECIFIED TYPE: Primary | ICD-10-CM

## 2023-10-27 LAB
BLD PROD TYP BPU: NORMAL
BLOOD UNIT EXPIRATION DATE: NORMAL
BLOOD UNIT TYPE CODE: 6200
BLOOD UNIT TYPE: NORMAL
CODING SYSTEM: NORMAL
CROSSMATCH INTERPRETATION: NORMAL
DISPENSE STATUS: NORMAL
NUM UNITS TRANS PACKED RBC: NORMAL

## 2023-10-27 PROCEDURE — 36430 TRANSFUSION BLD/BLD COMPNT: CPT

## 2023-10-27 PROCEDURE — P9016 RBC LEUKOCYTES REDUCED: HCPCS | Performed by: NURSE PRACTITIONER

## 2023-10-27 PROCEDURE — 25000003 PHARM REV CODE 250: Performed by: NURSE PRACTITIONER

## 2023-10-27 RX ORDER — HYDROCODONE BITARTRATE AND ACETAMINOPHEN 500; 5 MG/1; MG/1
TABLET ORAL
Status: DISCONTINUED | OUTPATIENT
Start: 2023-10-27 | End: 2023-12-17 | Stop reason: HOSPADM

## 2023-10-27 RX ADMIN — SODIUM CHLORIDE: 0.9 INJECTION, SOLUTION INTRAVENOUS at 07:10

## 2023-10-27 NOTE — ED NOTES
----- Message from Dora Boyd sent at 10/27/2023 11:32 AM CDT -----  She is having arm and shoulder pain. Dr. Bosch called in a prescription for her. Its helping the pain. She wants to see what is wrong with it. She can wait 2 weeks to see [place] or Dr. Bosch . She said she is in quarantine for 2 weeks.   pt's # [phone]      Code Sepsis called overhead.

## 2023-10-27 NOTE — PROGRESS NOTES
1035 Resp nonlabored, bbs cta. Denies any c/o or concerns on discharge. Left via amb in care of mother in nad.

## 2023-10-27 NOTE — PATIENT INSTRUCTIONS
Follow up with CALLIE Kaur NP as directed. Go to ER for any chest pain, shortness of breath or any other concerning symptoms. Keep your dialysis appointment tomorrow 10/28/23.

## 2023-10-30 LAB
ABO + RH BLD: NORMAL
BLD GP AB SCN CELLS X3 SERPL QL: NORMAL
SPECIMEN OUTDATE: NORMAL

## 2023-11-09 ENCOUNTER — TELEPHONE (OUTPATIENT)
Dept: ENDOCRINOLOGY | Facility: CLINIC | Age: 33
End: 2023-11-09
Payer: MEDICAID

## 2023-11-09 DIAGNOSIS — R53.81 PHYSICAL DECONDITIONING: Primary | ICD-10-CM

## 2023-12-06 ENCOUNTER — TELEPHONE (OUTPATIENT)
Dept: NEUROLOGY | Facility: CLINIC | Age: 33
End: 2023-12-06
Payer: MEDICAID

## 2023-12-06 NOTE — TELEPHONE ENCOUNTER
Spoke to the dialysis center, they think Hope may have called me.  She is gone for the day.  They will have her call back tomorrow.

## 2023-12-06 NOTE — TELEPHONE ENCOUNTER
----- Message from Richard Davison sent at 12/6/2023  1:31 PM CST -----  Type: Needs Medical Advice  Who Called:  Thomas Topete  Best Call Back Number: [phone]  Additional Information: calling to check the status of a referral that was sent over on 11/15, pl call bk and advise thanks

## 2023-12-07 NOTE — TELEPHONE ENCOUNTER
Spoke with Hope, informed her that the medicaid panel is full.  Gave her number for [facility] and Medicaid access line.

## 2023-12-12 ENCOUNTER — HOSPITAL ENCOUNTER (INPATIENT)
Facility: HOSPITAL | Age: 33
LOS: 5 days | Discharge: HOME | DRG: 391 | End: 2023-12-17
Attending: EMERGENCY MEDICINE | Admitting: STUDENT IN AN ORGANIZED HEALTH CARE EDUCATION/TRAINING PROGRAM
Payer: MEDICAID

## 2023-12-12 DIAGNOSIS — E87.20 METABOLIC ACIDOSIS: ICD-10-CM

## 2023-12-12 DIAGNOSIS — Z99.2 ESRD ON HEMODIALYSIS: ICD-10-CM

## 2023-12-12 DIAGNOSIS — N18.6 ANEMIA IN ESRD (END-STAGE RENAL DISEASE): ICD-10-CM

## 2023-12-12 DIAGNOSIS — I50.9 CONGESTIVE HEART FAILURE: ICD-10-CM

## 2023-12-12 DIAGNOSIS — Z78.9 FREQUENT HOSPITAL ADMISSIONS: ICD-10-CM

## 2023-12-12 DIAGNOSIS — N18.6 ESRD ON HEMODIALYSIS: ICD-10-CM

## 2023-12-12 DIAGNOSIS — R07.9 CHEST PAIN: ICD-10-CM

## 2023-12-12 DIAGNOSIS — K31.84 DIABETIC GASTROPARESIS: Primary | ICD-10-CM

## 2023-12-12 DIAGNOSIS — E87.5 HYPERKALEMIA: ICD-10-CM

## 2023-12-12 DIAGNOSIS — R10.9 ABDOMINAL PAIN: ICD-10-CM

## 2023-12-12 DIAGNOSIS — E11.43 DIABETIC GASTROPARESIS: Primary | ICD-10-CM

## 2023-12-12 DIAGNOSIS — D57.3 SICKLE CELL TRAIT: Chronic | ICD-10-CM

## 2023-12-12 DIAGNOSIS — D63.1 ANEMIA IN ESRD (END-STAGE RENAL DISEASE): ICD-10-CM

## 2023-12-12 DIAGNOSIS — E80.6 HYPERBILIRUBINEMIA: ICD-10-CM

## 2023-12-12 LAB
ABO + RH BLD: NORMAL
ALBUMIN SERPL BCP-MCNC: 3.3 G/DL (ref 3.5–5.2)
ALP SERPL-CCNC: 173 U/L (ref 55–135)
ALT SERPL W/O P-5'-P-CCNC: 73 U/L (ref 10–44)
ANION GAP SERPL CALC-SCNC: 19 MMOL/L (ref 8–16)
AST SERPL-CCNC: 87 U/L (ref 10–40)
B-OH-BUTYR BLD STRIP-SCNC: 0.1 MMOL/L (ref 0–0.5)
BASOPHILS # BLD AUTO: 0.01 K/UL (ref 0–0.2)
BASOPHILS NFR BLD: 0.2 % (ref 0–1.9)
BILIRUB SERPL-MCNC: 0.8 MG/DL (ref 0.1–1)
BLD GP AB SCN CELLS X3 SERPL QL: NORMAL
BLD PROD TYP BPU: NORMAL
BLD PROD TYP BPU: NORMAL
BLOOD UNIT EXPIRATION DATE: NORMAL
BLOOD UNIT EXPIRATION DATE: NORMAL
BLOOD UNIT TYPE CODE: 600
BLOOD UNIT TYPE CODE: 6200
BLOOD UNIT TYPE: NORMAL
BLOOD UNIT TYPE: NORMAL
BUN SERPL-MCNC: 54 MG/DL (ref 6–20)
CALCIUM SERPL-MCNC: 7.6 MG/DL (ref 8.7–10.5)
CHLORIDE SERPL-SCNC: 104 MMOL/L (ref 95–110)
CO2 SERPL-SCNC: 18 MMOL/L (ref 23–29)
CODING SYSTEM: NORMAL
CODING SYSTEM: NORMAL
CREAT SERPL-MCNC: 10.4 MG/DL (ref 0.5–1.4)
CROSSMATCH INTERPRETATION: NORMAL
CROSSMATCH INTERPRETATION: NORMAL
DIFFERENTIAL METHOD: ABNORMAL
DISPENSE STATUS: NORMAL
DISPENSE STATUS: NORMAL
EOSINOPHIL # BLD AUTO: 0.1 K/UL (ref 0–0.5)
EOSINOPHIL NFR BLD: 2.1 % (ref 0–8)
ERYTHROCYTE [DISTWIDTH] IN BLOOD BY AUTOMATED COUNT: 21 % (ref 11.5–14.5)
EST. GFR  (NO RACE VARIABLE): 5 ML/MIN/1.73 M^2
GLUCOSE SERPL-MCNC: 275 MG/DL (ref 70–110)
HCT VFR BLD AUTO: 19.1 % (ref 37–48.5)
HGB BLD-MCNC: 6.2 G/DL (ref 12–16)
IMM GRANULOCYTES # BLD AUTO: 0.03 K/UL (ref 0–0.04)
IMM GRANULOCYTES NFR BLD AUTO: 0.5 % (ref 0–0.5)
LIPASE SERPL-CCNC: 25 U/L (ref 4–60)
LYMPHOCYTES # BLD AUTO: 0.5 K/UL (ref 1–4.8)
LYMPHOCYTES NFR BLD: 8.2 % (ref 18–48)
MCH RBC QN AUTO: 29 PG (ref 27–31)
MCHC RBC AUTO-ENTMCNC: 32.5 G/DL (ref 32–36)
MCV RBC AUTO: 89 FL (ref 82–98)
MONOCYTES # BLD AUTO: 0.3 K/UL (ref 0.3–1)
MONOCYTES NFR BLD: 5.8 % (ref 4–15)
NEUTROPHILS # BLD AUTO: 4.7 K/UL (ref 1.8–7.7)
NEUTROPHILS NFR BLD: 83.2 % (ref 38–73)
NRBC BLD-RTO: 0 /100 WBC
NUM UNITS TRANS PACKED RBC: NORMAL
NUM UNITS TRANS PACKED RBC: NORMAL
PLATELET # BLD AUTO: 102 K/UL (ref 150–450)
PMV BLD AUTO: 9.9 FL (ref 9.2–12.9)
POCT GLUCOSE: 132 MG/DL (ref 70–110)
POCT GLUCOSE: 307 MG/DL (ref 70–110)
POTASSIUM SERPL-SCNC: 5.3 MMOL/L (ref 3.5–5.1)
PROT SERPL-MCNC: 6.9 G/DL (ref 6–8.4)
RBC # BLD AUTO: 2.14 M/UL (ref 4–5.4)
SODIUM SERPL-SCNC: 141 MMOL/L (ref 136–145)
SPECIMEN OUTDATE: NORMAL
WBC # BLD AUTO: 5.7 K/UL (ref 3.9–12.7)

## 2023-12-12 PROCEDURE — 80100014 HC HEMODIALYSIS 1:1

## 2023-12-12 PROCEDURE — 86920 COMPATIBILITY TEST SPIN: CPT | Performed by: HOSPITALIST

## 2023-12-12 PROCEDURE — G0378 HOSPITAL OBSERVATION PER HR: HCPCS

## 2023-12-12 PROCEDURE — 80053 COMPREHEN METABOLIC PANEL: CPT | Performed by: EMERGENCY MEDICINE

## 2023-12-12 PROCEDURE — 85025 COMPLETE CBC W/AUTO DIFF WBC: CPT | Performed by: EMERGENCY MEDICINE

## 2023-12-12 PROCEDURE — 93010 ELECTROCARDIOGRAM REPORT: CPT | Mod: ,,, | Performed by: GENERAL PRACTICE

## 2023-12-12 PROCEDURE — 99285 EMERGENCY DEPT VISIT HI MDM: CPT

## 2023-12-12 PROCEDURE — 86850 RBC ANTIBODY SCREEN: CPT | Performed by: HOSPITALIST

## 2023-12-12 PROCEDURE — 36430 TRANSFUSION BLD/BLD COMPNT: CPT

## 2023-12-12 PROCEDURE — 63600175 PHARM REV CODE 636 W HCPCS: Performed by: HOSPITALIST

## 2023-12-12 PROCEDURE — 99900035 HC TECH TIME PER 15 MIN (STAT)

## 2023-12-12 PROCEDURE — 63600175 PHARM REV CODE 636 W HCPCS: Performed by: EMERGENCY MEDICINE

## 2023-12-12 PROCEDURE — 96375 TX/PRO/DX INJ NEW DRUG ADDON: CPT

## 2023-12-12 PROCEDURE — 82010 KETONE BODYS QUAN: CPT | Performed by: EMERGENCY MEDICINE

## 2023-12-12 PROCEDURE — 36415 COLL VENOUS BLD VENIPUNCTURE: CPT | Performed by: EMERGENCY MEDICINE

## 2023-12-12 PROCEDURE — 96376 TX/PRO/DX INJ SAME DRUG ADON: CPT

## 2023-12-12 PROCEDURE — 25000003 PHARM REV CODE 250: Performed by: HOSPITALIST

## 2023-12-12 PROCEDURE — 96365 THER/PROPH/DIAG IV INF INIT: CPT

## 2023-12-12 PROCEDURE — P9040 RBC LEUKOREDUCED IRRADIATED: HCPCS | Performed by: HOSPITALIST

## 2023-12-12 PROCEDURE — 93005 ELECTROCARDIOGRAM TRACING: CPT

## 2023-12-12 PROCEDURE — 83690 ASSAY OF LIPASE: CPT | Performed by: EMERGENCY MEDICINE

## 2023-12-12 PROCEDURE — 11000001 HC ACUTE MED/SURG PRIVATE ROOM

## 2023-12-12 PROCEDURE — 93010 EKG 12-LEAD: ICD-10-PCS | Mod: ,,, | Performed by: GENERAL PRACTICE

## 2023-12-12 PROCEDURE — 36415 COLL VENOUS BLD VENIPUNCTURE: CPT | Performed by: HOSPITALIST

## 2023-12-12 PROCEDURE — 25000003 PHARM REV CODE 250: Performed by: EMERGENCY MEDICINE

## 2023-12-12 PROCEDURE — 82962 GLUCOSE BLOOD TEST: CPT

## 2023-12-12 RX ORDER — TALC
6 POWDER (GRAM) TOPICAL NIGHTLY PRN
Status: DISCONTINUED | OUTPATIENT
Start: 2023-12-12 | End: 2023-12-17 | Stop reason: HOSPADM

## 2023-12-12 RX ORDER — MORPHINE SULFATE 2 MG/ML
6 INJECTION, SOLUTION INTRAMUSCULAR; INTRAVENOUS
Status: DISCONTINUED | OUTPATIENT
Start: 2023-12-12 | End: 2023-12-12

## 2023-12-12 RX ORDER — CARVEDILOL 25 MG/1
25 TABLET ORAL 2 TIMES DAILY
Status: DISCONTINUED | OUTPATIENT
Start: 2023-12-12 | End: 2023-12-17 | Stop reason: HOSPADM

## 2023-12-12 RX ORDER — FLUOXETINE 10 MG/1
20 CAPSULE ORAL DAILY
Status: DISCONTINUED | OUTPATIENT
Start: 2023-12-13 | End: 2023-12-17 | Stop reason: HOSPADM

## 2023-12-12 RX ORDER — ONDANSETRON 4 MG/1
8 TABLET, ORALLY DISINTEGRATING ORAL EVERY 8 HOURS PRN
Status: DISCONTINUED | OUTPATIENT
Start: 2023-12-12 | End: 2023-12-17 | Stop reason: HOSPADM

## 2023-12-12 RX ORDER — ONDANSETRON 2 MG/ML
8 INJECTION INTRAMUSCULAR; INTRAVENOUS
Status: COMPLETED | OUTPATIENT
Start: 2023-12-12 | End: 2023-12-12

## 2023-12-12 RX ORDER — IBUPROFEN 200 MG
16 TABLET ORAL
Status: DISCONTINUED | OUTPATIENT
Start: 2023-12-12 | End: 2023-12-17 | Stop reason: HOSPADM

## 2023-12-12 RX ORDER — MORPHINE SULFATE 2 MG/ML
6 INJECTION, SOLUTION INTRAMUSCULAR; INTRAVENOUS
Status: COMPLETED | OUTPATIENT
Start: 2023-12-12 | End: 2023-12-12

## 2023-12-12 RX ORDER — MORPHINE SULFATE 4 MG/ML
2 INJECTION, SOLUTION INTRAMUSCULAR; INTRAVENOUS EVERY 4 HOURS PRN
Status: DISCONTINUED | OUTPATIENT
Start: 2023-12-12 | End: 2023-12-13

## 2023-12-12 RX ORDER — NALOXONE HCL 0.4 MG/ML
0.02 VIAL (ML) INJECTION
Status: DISCONTINUED | OUTPATIENT
Start: 2023-12-12 | End: 2023-12-17 | Stop reason: HOSPADM

## 2023-12-12 RX ORDER — HYDRALAZINE HYDROCHLORIDE 20 MG/ML
10 INJECTION INTRAMUSCULAR; INTRAVENOUS ONCE
Status: DISCONTINUED | OUTPATIENT
Start: 2023-12-12 | End: 2023-12-13

## 2023-12-12 RX ORDER — LEVETIRACETAM 500 MG/1
500 TABLET ORAL 2 TIMES DAILY
Status: DISCONTINUED | OUTPATIENT
Start: 2023-12-12 | End: 2023-12-17 | Stop reason: HOSPADM

## 2023-12-12 RX ORDER — IBUPROFEN 200 MG
24 TABLET ORAL
Status: DISCONTINUED | OUTPATIENT
Start: 2023-12-12 | End: 2023-12-17 | Stop reason: HOSPADM

## 2023-12-12 RX ORDER — MAG HYDROX/ALUMINUM HYD/SIMETH 200-200-20
30 SUSPENSION, ORAL (FINAL DOSE FORM) ORAL 4 TIMES DAILY PRN
Status: DISCONTINUED | OUTPATIENT
Start: 2023-12-12 | End: 2023-12-17 | Stop reason: HOSPADM

## 2023-12-12 RX ORDER — INSULIN ASPART 100 [IU]/ML
1-10 INJECTION, SOLUTION INTRAVENOUS; SUBCUTANEOUS
Status: DISCONTINUED | OUTPATIENT
Start: 2023-12-12 | End: 2023-12-17 | Stop reason: HOSPADM

## 2023-12-12 RX ORDER — AMLODIPINE BESYLATE 5 MG/1
5 TABLET ORAL DAILY
Status: DISCONTINUED | OUTPATIENT
Start: 2023-12-13 | End: 2023-12-17 | Stop reason: HOSPADM

## 2023-12-12 RX ORDER — PROCHLORPERAZINE EDISYLATE 5 MG/ML
5 INJECTION INTRAMUSCULAR; INTRAVENOUS EVERY 6 HOURS PRN
Status: DISCONTINUED | OUTPATIENT
Start: 2023-12-12 | End: 2023-12-17 | Stop reason: HOSPADM

## 2023-12-12 RX ORDER — ISOSORBIDE MONONITRATE 30 MG/1
30 TABLET, EXTENDED RELEASE ORAL DAILY
Status: DISCONTINUED | OUTPATIENT
Start: 2023-12-13 | End: 2023-12-17 | Stop reason: HOSPADM

## 2023-12-12 RX ORDER — PANTOPRAZOLE SODIUM 40 MG/10ML
40 INJECTION, POWDER, LYOPHILIZED, FOR SOLUTION INTRAVENOUS DAILY
Status: DISCONTINUED | OUTPATIENT
Start: 2023-12-13 | End: 2023-12-13

## 2023-12-12 RX ORDER — POLYETHYLENE GLYCOL 3350 17 G/17G
17 POWDER, FOR SOLUTION ORAL DAILY
Status: DISCONTINUED | OUTPATIENT
Start: 2023-12-13 | End: 2023-12-17 | Stop reason: HOSPADM

## 2023-12-12 RX ORDER — GLUCAGON 1 MG
1 KIT INJECTION
Status: DISCONTINUED | OUTPATIENT
Start: 2023-12-12 | End: 2023-12-17 | Stop reason: HOSPADM

## 2023-12-12 RX ORDER — HYDRALAZINE HYDROCHLORIDE 25 MG/1
100 TABLET, FILM COATED ORAL 2 TIMES DAILY
Status: DISCONTINUED | OUTPATIENT
Start: 2023-12-12 | End: 2023-12-17 | Stop reason: HOSPADM

## 2023-12-12 RX ORDER — SODIUM CHLORIDE 0.9 % (FLUSH) 0.9 %
10 SYRINGE (ML) INJECTION EVERY 8 HOURS PRN
Status: DISCONTINUED | OUTPATIENT
Start: 2023-12-12 | End: 2023-12-17 | Stop reason: HOSPADM

## 2023-12-12 RX ORDER — DIPHENHYDRAMINE HYDROCHLORIDE 50 MG/ML
25 INJECTION INTRAMUSCULAR; INTRAVENOUS
Status: COMPLETED | OUTPATIENT
Start: 2023-12-12 | End: 2023-12-12

## 2023-12-12 RX ORDER — IPRATROPIUM BROMIDE AND ALBUTEROL SULFATE 2.5; .5 MG/3ML; MG/3ML
3 SOLUTION RESPIRATORY (INHALATION) EVERY 6 HOURS PRN
Status: DISCONTINUED | OUTPATIENT
Start: 2023-12-12 | End: 2023-12-17 | Stop reason: HOSPADM

## 2023-12-12 RX ORDER — INSULIN ASPART 100 [IU]/ML
8 INJECTION, SOLUTION INTRAVENOUS; SUBCUTANEOUS
Status: DISCONTINUED | OUTPATIENT
Start: 2023-12-13 | End: 2023-12-17 | Stop reason: HOSPADM

## 2023-12-12 RX ORDER — SIMETHICONE 80 MG
1 TABLET,CHEWABLE ORAL 4 TIMES DAILY PRN
Status: DISCONTINUED | OUTPATIENT
Start: 2023-12-12 | End: 2023-12-17 | Stop reason: HOSPADM

## 2023-12-12 RX ORDER — ACETAMINOPHEN 325 MG/1
650 TABLET ORAL EVERY 4 HOURS PRN
Status: DISCONTINUED | OUTPATIENT
Start: 2023-12-12 | End: 2023-12-17 | Stop reason: HOSPADM

## 2023-12-12 RX ORDER — MORPHINE SULFATE 4 MG/ML
4 INJECTION, SOLUTION INTRAMUSCULAR; INTRAVENOUS EVERY 4 HOURS PRN
Status: DISCONTINUED | OUTPATIENT
Start: 2023-12-12 | End: 2023-12-13

## 2023-12-12 RX ORDER — GABAPENTIN 300 MG/1
600 CAPSULE ORAL 2 TIMES DAILY
Status: DISCONTINUED | OUTPATIENT
Start: 2023-12-12 | End: 2023-12-17 | Stop reason: HOSPADM

## 2023-12-12 RX ORDER — HYDROCODONE BITARTRATE AND ACETAMINOPHEN 500; 5 MG/1; MG/1
TABLET ORAL
Status: DISCONTINUED | OUTPATIENT
Start: 2023-12-12 | End: 2023-12-14

## 2023-12-12 RX ADMIN — PROMETHAZINE HYDROCHLORIDE 25 MG: 25 INJECTION INTRAMUSCULAR; INTRAVENOUS at 12:12

## 2023-12-12 RX ADMIN — MORPHINE SULFATE 4 MG: 4 INJECTION, SOLUTION INTRAMUSCULAR; INTRAVENOUS at 01:12

## 2023-12-12 RX ADMIN — CARVEDILOL 25 MG: 25 TABLET, FILM COATED ORAL at 09:12

## 2023-12-12 RX ADMIN — DIPHENHYDRAMINE HYDROCHLORIDE 25 MG: 50 INJECTION INTRAMUSCULAR; INTRAVENOUS at 12:12

## 2023-12-12 RX ADMIN — ONDANSETRON 8 MG: 2 INJECTION INTRAMUSCULAR; INTRAVENOUS at 09:12

## 2023-12-12 RX ADMIN — LEVETIRACETAM 500 MG: 500 TABLET, FILM COATED ORAL at 09:12

## 2023-12-12 RX ADMIN — MORPHINE SULFATE 6 MG: 2 INJECTION, SOLUTION INTRAMUSCULAR; INTRAVENOUS at 09:12

## 2023-12-12 RX ADMIN — ONDANSETRON 8 MG: 4 TABLET, ORALLY DISINTEGRATING ORAL at 06:12

## 2023-12-12 RX ADMIN — GABAPENTIN 600 MG: 300 CAPSULE ORAL at 09:12

## 2023-12-12 RX ADMIN — HYDRALAZINE HYDROCHLORIDE 100 MG: 25 TABLET, FILM COATED ORAL at 09:12

## 2023-12-12 NOTE — ASSESSMENT & PLAN NOTE
Patient's anemia is currently uncontrolled. Has not received any PRBCs to date. Etiology likely d/t chronic disease due to Chronic Kidney Disease/ESRD  Current CBC reviewed-   Lab Results   Component Value Date    HGB 6.2 (LL) 12/12/2023    HCT 19.1 (LL) 12/12/2023     Monitor serial CBC and transfuse if patient becomes hemodynamically unstable, symptomatic or H/H drops below 7/21.  Transfuse 2 units 12/12

## 2023-12-12 NOTE — SUBJECTIVE & OBJECTIVE
Past Medical History:   Diagnosis Date    ESRD on hemodialysis 2022    Gastritis 2022    EGD was 22    Gastroparesis 2022    has not had Emptying study    Heart failure with preserved ejection fraction 2022    EF 55% on 3/22    History of supraventricular tachycardia     Hyperkalemia 2022    Hypertensive emergency 2022    Sickle cell trait 2022    Type 2 diabetes mellitus        Past Surgical History:   Procedure Laterality Date     SECTION      x 3    COLONOSCOPY      COLONOSCOPY N/A 2022    Procedure: COLONOSCOPY;  Surgeon: Jagdeep Cedeno MD;  Location: [facility];  Service: Endoscopy;  Laterality: N/A;    ESOPHAGOGASTRODUODENOSCOPY N/A 10/18/2019    Procedure: ESOPHAGOGASTRODUODENOSCOPY (EGD);  Surgeon: Gianluca Mendez MD;  Location: [facility];  Service: Endoscopy;  Laterality: N/A;    ESOPHAGOGASTRODUODENOSCOPY N/A 2022    Procedure: EGD (ESOPHAGOGASTRODUODENOSCOPY);  Surgeon: Micky Paredes III, MD;  Location: [facility];  Service: Endoscopy;  Laterality: N/A;    ESOPHAGOGASTRODUODENOSCOPY N/A 2022    Procedure: EGD (ESOPHAGOGASTRODUODENOSCOPY);  Surgeon: Marcelo Zhong MD;  Location: [facility];  Service: Endoscopy;  Laterality: N/A;    INSERTION, CATHETER, TUNNELED N/A 2023    Procedure: Insertion,catheter,tunneled;  Surgeon: Carlos Thurman Jr., MD;  Location: [facility] OR;  Service: General;  Laterality: N/A;    LAPAROSCOPIC CHOLECYSTECTOMY N/A 2022    Procedure: CHOLECYSTECTOMY, LAPAROSCOPIC;  Surgeon: Grey Perez MD;  Location: [facility] OR;  Service: General;  Laterality: N/A;    PLACEMENT OF DUAL-LUMEN VASCULAR CATHETER Left 2022    Procedure: INSERTION-CATHETER-JOSEPH;  Surgeon: Dionte Gan MD;  Location: [facility] OR;  Service: General;  Laterality: Left;    PLACEMENT OF DUAL-LUMEN VASCULAR CATHETER Right 2022    Procedure: INSERTION-CATHETER-Hemosplit;  Surgeon: Dionte Gan MD;  Location: [facility];   Service: General;  Laterality: Right;       Review of patient's allergies indicates:   Allergen Reactions    Penicillins Hives       Current Facility-Administered Medications on File Prior to Encounter   Medication    0.9%  NaCl infusion (for blood administration)     Current Outpatient Medications on File Prior to Encounter   Medication Sig    amLODIPine (NORVASC) 5 MG tablet Take 1 tablet (5 mg total) by mouth once daily. (Patient taking differently: Take 5 mg by mouth once daily.)    apixaban (ELIQUIS) 5 mg Tab Take 1 tablet (5 mg total) by mouth 2 (two) times daily.    carvediloL (COREG) 25 MG tablet Take 1 tablet (25 mg total) by mouth 2 (two) times daily.    cetirizine (ZYRTEC) 10 MG tablet Take 1 tablet (10 mg total) by mouth once daily.    doxycycline (VIBRAMYCIN) 100 MG Cap Take 1 capsule twice a day by oral route for 7 days, for infectoin. (Patient taking differently: Take 100 mg by mouth every 12 (twelve) hours.)    FLUoxetine 20 MG capsule Take 1 capsule (20 mg total) by mouth once daily.    fluticasone propionate (FLONASE ALLERGY RELIEF) 50 mcg/actuation nasal spray [place] 1 spray every day by intranasal route. (Patient taking differently: 1 spray by Each Nostril route Daily.)    gabapentin (NEURONTIN) 300 MG capsule Take 2 capsules twice a day by oral route for 90 days. (Patient taking differently: Take 600 mg by mouth 2 (two) times daily.)    hydrALAZINE (APRESOLINE) 100 MG tablet Take 1 tablet (100 mg total) by mouth 2 (two) times daily.    insulin aspart U-100 (NOVOLOG FLEXPEN U-100 INSULIN) 100 unit/mL (3 mL) InPn pen Inject 8 units 3 times a day by subcutaneous route before meals (Patient taking differently: Inject 8 Units into the skin 3 (three) times daily before meals.)    insulin detemir U-100, Levemir, (LEVEMIR FLEXTOUCH U100 INSULIN) 100 unit/mL (3 mL) InPn pen Inject 21 units every day by subcutaneous route in the evening for 90 days. (Patient taking differently: Inject 21 Units into the  skin every evening.)    isosorbide mononitrate (IMDUR) 30 MG 24 hr tablet Take 1 tablet (30 mg total) by mouth once daily.    levETIRAcetam (KEPPRA) 500 MG Tab Take 1 tablet twice a day by oral route for 30 days. (Patient taking differently: Take 500 mg by mouth 2 (two) times daily.)    ondansetron (ZOFRAN-ODT) 4 MG TbDL Place 1 tablet (4 mg total) under the tongue every 8 (eight) hours.    oxyCODONE-acetaminophen (PERCOCET) 5-325 mg per tablet TAKE 1 TABLET EVERY 6 HOURS AS NEEDED FOP PAIN (Patient taking differently: Take 1 tablet by mouth every 6 (six) hours as needed for Pain.)    pantoprazole (PROTONIX) 40 MG tablet Take 1 tablet (40 mg total) by mouth once daily.    promethazine (PHENERGAN) 25 MG tablet Take 1 tablet (25 mg total) by mouth every 6 (six) hours as needed for 5 days. (Patient taking differently: Take 25 mg by mouth every 6 (six) hours as needed for Nausea.)    sevelamer carbonate (RENVELA) 800 mg Tab Take 2 tablets with meals three times a day (Patient taking differently: Take 1,600 mg by mouth 3 (three) times daily with meals.)    sucralfate (CARAFATE) 100 mg/mL suspension Take 10 mL 4 times a day by oral route before meals for 30 days. (Patient taking differently: Take 1 g by mouth 4 (four) times daily with meals and nightly.)    amLODIPine (NORVASC) 5 MG tablet Take 1 tablet every day by oral route for 90 days.    blood sugar diagnostic (TRUE METRIX GLUCOSE TEST STRIP) Strp Use 1 strip to check blood glucose three times daily    blood-glucose meter (TRUE METRIX GLUCOSE METER) Misc Use to check blood glucose    brimonidine 0.2% (ALPHAGAN) 0.2 % Drop Place 1 drop in right eye twice daily.    carvediloL (COREG) 25 MG tablet Take 1 tablet (25 mg total) by mouth 2 (two) times daily.    ciprofloxacin HCl (CILOXAN) 0.3 % ophthalmic solution instill 1 drop into the right eye 4 times a day for 30 days    FLUoxetine 20 MG capsule Take 1 capsule (20 mg total) by mouth once daily.    gabapentin  "(NEURONTIN) 300 MG capsule Take 2 capsules (600 mg total) by mouth 2 (two) times daily.    insulin aspart U-100 (NOVOLOG FLEXPEN U-100 INSULIN) 100 unit/mL (3 mL) InPn pen Inject 10 units 3 times a day by subcutaneous route before meals for 90 days.    insulin detemir U-100, Levemir, (LEVEMIR FLEXTOUCH U100 INSULIN) 100 unit/mL (3 mL) InPn pen Inject 18 units every day by subcutaneous route in the evening    insulin detemir U-100, Levemir, (LEVEMIR FLEXTOUCH U100 INSULIN) 100 unit/mL (3 mL) InPn pen Inject 22 units every day by subcutaneous route in the evening for 90 days.    isosorbide mononitrate (IMDUR) 30 MG 24 hr tablet Take 1 tablet (30 mg total) by mouth once daily.    ketorolac 0.5% (ACULAR) 0.5 % Drop Instill 1 drop into the right eye 4 times a day for 30 days    lancets (TRUEPLUS LANCETS) 33 gauge Misc Use 1 to check blood glucose three times daily    lancets 33 gauge Misc Use to test twice daily    levETIRAcetam (KEPPRA) 500 MG Tab Take 1 tablet (500 mg total) by mouth 2 (two) times daily.    ondansetron (ZOFRAN-ODT) 4 MG TbDL Dissolve 1 tablet (4 mg total) by mouth every 8 (eight) hours.    pen needle, diabetic 31 gauge x 3/16" Ndle Use as directed to inject insulin 5 times daily    prednisoLONE acetate (PRED FORTE) 1 % DrpS Instill 1 drops into the right eye 4 times a day for 30 days    promethazine (PHENERGAN) 25 MG suppository Place 1 suppository (25 mg total) rectally every 6 (six) hours as needed for Nausea.    sucroferric oxyhydroxide (VELPHORO) 500 mg Chew Take 1 tablet 3 times a day (Patient taking differently: Take 1 tablet by mouth 3 (three) times daily.)    timolol maleate 0.5% (TIMOPTIC) 0.5 % Drop Instill 1 drop into the right eye 2 times a day for 30 days    [DISCONTINUED] atenoloL (TENORMIN) 50 MG tablet Take 1 tablet (50 mg total) by mouth every other day.    [DISCONTINUED] fluticasone propionate (FLONASE) 50 mcg/actuation nasal spray [place] 1 spray every day by intranasal route.    " [DISCONTINUED] omeprazole (PRILOSEC) 20 MG capsule Take 2 capsules (40 mg total) by mouth once daily. for 10 days    [DISCONTINUED] pantoprazole (PROTONIX) 40 MG tablet Take 1 tablet (40 mg total) by mouth once daily.     Family History       Problem Relation (Age of Onset)    Diabetes Mother, Father          Tobacco Use    Smoking status: Never    Smokeless tobacco: Never   Substance and Sexual Activity    Alcohol use: Not Currently    Drug use: No    Sexual activity: Not Currently     Partners: Male     Birth control/protection: I.U.D.     Review of Systems   Constitutional:  Positive for chills. Negative for fever.   HENT:  Negative for congestion and rhinorrhea.    Respiratory:  Negative for cough, shortness of breath and wheezing.    Cardiovascular:  Negative for chest pain, palpitations and leg swelling.   Gastrointestinal:  Positive for abdominal pain, nausea and vomiting. Negative for abdominal distention.   Endocrine: Negative for cold intolerance and heat intolerance.   Genitourinary:  Negative for dysuria and urgency.   Musculoskeletal:  Negative for arthralgias and neck stiffness.   Skin:  Negative for rash and wound.   Neurological:  Negative for dizziness and weakness.     Objective:     Vital Signs (Most Recent):  Temp: 99.1 °F (37.3 °C) (12/12/23 0812)  Pulse: 91 (12/12/23 1102)  Resp: 18 (12/12/23 1102)  BP: (!) 165/87 (12/12/23 1102)  SpO2: 95 % (12/12/23 1102) Vital Signs (24h Range):  Temp:  [99.1 °F (37.3 °C)] 99.1 °F (37.3 °C)  Pulse:  [88-99] 91  Resp:  [18-22] 18  SpO2:  [94 %-99 %] 95 %  BP: (116-190)/() 165/87     Weight: 61.2 kg (135 lb)  Body mass index is 24.69 kg/m².     Physical Exam  Constitutional:       Appearance: She is well-developed.      Comments: Appears uncomfortable   HENT:      Head: Normocephalic and atraumatic.   Eyes:      Pupils: Pupils are equal, round, and reactive to light.   Cardiovascular:      Rate and Rhythm: Normal rate and regular rhythm.      Heart  sounds: No murmur heard.  Pulmonary:      Effort: Pulmonary effort is normal. No respiratory distress.      Breath sounds: Normal breath sounds. No wheezing or rales.   Abdominal:      General: Bowel sounds are normal. There is no distension.      Palpations: Abdomen is soft.      Tenderness: There is no abdominal tenderness.      Comments: Emesis in bag at bedside   Musculoskeletal:         General: Normal range of motion.   Skin:     General: Skin is warm and dry.      Findings: No rash.   Neurological:      Mental Status: She is alert and oriented to person, place, and time.      Cranial Nerves: No cranial nerve deficit.   Psychiatric:         Behavior: Behavior normal.              CRANIAL NERVES     CN III, IV, VI   Pupils are equal, round, and reactive to light.       Significant Labs: All pertinent labs within the past 24 hours have been reviewed.    Significant Imaging: I have reviewed all pertinent imaging results/findings within the past 24 hours.

## 2023-12-12 NOTE — H&P
[facility] Medicine  History & Physical    Patient Name: Tabby Howard  MRN: [id number]  Patient Class: OP- Observation  Admission Date: 2023  Attending Physician: Linda Cueto MD  Primary Care Provider: Melony Kim NP         Patient information was obtained from patient, past medical records, and ER records.     Subjective:     Principal Problem:Metabolic acidosis    Chief Complaint:   Chief Complaint   Patient presents with    Abdominal Pain     Beginning this AM with abdominal distention, dialysis treatment on Saturday        HPI: Patient is a 33-year-old female with history of ESRD on HD, suspected gastroparesis, hypertension, pericardial effusion, type 2 diabetes who was seen today with complaint of nausea and vomiting since this morning.  Also with abdominal pain.  She reports she has had similar episodes in the past.  She denies any sick contacts.  No fevers cough congestion or runny nose.  Does report some chills.  She does not make urine.  She reports last bowel movement was yesterday.  Workup in the ED significant for metabolic acidosis with anion gap of 19, potassium 5.3.  Hemoglobin is 6.2 and glucose is 275.  Beta hydroxybutyrate is normal.  I discussed management with nephrologist Dr. Peguero who recommends dialysis today with 2 units of blood.  Orders placed.      Past Medical History:   Diagnosis Date    ESRD on hemodialysis 2022    Gastritis 2022    EGD was 22    Gastroparesis 2022    has not had Emptying study    Heart failure with preserved ejection fraction 2022    EF 55% on 3/22    History of supraventricular tachycardia     Hyperkalemia 2022    Hypertensive emergency 2022    Sickle cell trait 2022    Type 2 diabetes mellitus        Past Surgical History:   Procedure Laterality Date     SECTION      x 3    COLONOSCOPY      COLONOSCOPY N/A 2022    Procedure: COLONOSCOPY;  Surgeon: Jagdeep Cedeno,  MD;  Location: [facility];  Service: Endoscopy;  Laterality: N/A;    ESOPHAGOGASTRODUODENOSCOPY N/A 10/18/2019    Procedure: ESOPHAGOGASTRODUODENOSCOPY (EGD);  Surgeon: Gianluca Mendez MD;  Location: [facility];  Service: Endoscopy;  Laterality: N/A;    ESOPHAGOGASTRODUODENOSCOPY N/A 08/24/2022    Procedure: EGD (ESOPHAGOGASTRODUODENOSCOPY);  Surgeon: Micky Paredes III, MD;  Location: [facility];  Service: Endoscopy;  Laterality: N/A;    ESOPHAGOGASTRODUODENOSCOPY N/A 12/5/2022    Procedure: EGD (ESOPHAGOGASTRODUODENOSCOPY);  Surgeon: Marcelo Zhong MD;  Location: [facility];  Service: Endoscopy;  Laterality: N/A;    INSERTION, CATHETER, TUNNELED N/A 6/17/2023    Procedure: Insertion,catheter,tunneled;  Surgeon: Carlos Thurman Jr., MD;  Location: [facility] OR;  Service: General;  Laterality: N/A;    LAPAROSCOPIC CHOLECYSTECTOMY N/A 07/30/2022    Procedure: CHOLECYSTECTOMY, LAPAROSCOPIC;  Surgeon: Grey Perez MD;  Location: [facility] OR;  Service: General;  Laterality: N/A;    PLACEMENT OF DUAL-LUMEN VASCULAR CATHETER Left 07/12/2022    Procedure: INSERTION-CATHETER-JOSEPH;  Surgeon: Dionte Gan MD;  Location: [facility] OR;  Service: General;  Laterality: Left;    PLACEMENT OF DUAL-LUMEN VASCULAR CATHETER Right 07/26/2022    Procedure: INSERTION-CATHETER-Hemosplit;  Surgeon: Dionte Gan MD;  Location: [facility] OR;  Service: General;  Laterality: Right;       Review of patient's allergies indicates:   Allergen Reactions    Penicillins Hives       Current Facility-Administered Medications on File Prior to Encounter   Medication    0.9%  NaCl infusion (for blood administration)     Current Outpatient Medications on File Prior to Encounter   Medication Sig    amLODIPine (NORVASC) 5 MG tablet Take 1 tablet (5 mg total) by mouth once daily. (Patient taking differently: Take 5 mg by mouth once daily.)    apixaban (ELIQUIS) 5 mg Tab Take 1 tablet (5 mg total) by mouth 2 (two) times daily.    carvediloL (COREG) 25 MG  tablet Take 1 tablet (25 mg total) by mouth 2 (two) times daily.    cetirizine (ZYRTEC) 10 MG tablet Take 1 tablet (10 mg total) by mouth once daily.    doxycycline (VIBRAMYCIN) 100 MG Cap Take 1 capsule twice a day by oral route for 7 days, for infectoin. (Patient taking differently: Take 100 mg by mouth every 12 (twelve) hours.)    FLUoxetine 20 MG capsule Take 1 capsule (20 mg total) by mouth once daily.    fluticasone propionate (FLONASE ALLERGY RELIEF) 50 mcg/actuation nasal spray [place] 1 spray every day by intranasal route. (Patient taking differently: 1 spray by Each Nostril route Daily.)    gabapentin (NEURONTIN) 300 MG capsule Take 2 capsules twice a day by oral route for 90 days. (Patient taking differently: Take 600 mg by mouth 2 (two) times daily.)    hydrALAZINE (APRESOLINE) 100 MG tablet Take 1 tablet (100 mg total) by mouth 2 (two) times daily.    insulin aspart U-100 (NOVOLOG FLEXPEN U-100 INSULIN) 100 unit/mL (3 mL) InPn pen Inject 8 units 3 times a day by subcutaneous route before meals (Patient taking differently: Inject 8 Units into the skin 3 (three) times daily before meals.)    insulin detemir U-100, Levemir, (LEVEMIR FLEXTOUCH U100 INSULIN) 100 unit/mL (3 mL) InPn pen Inject 21 units every day by subcutaneous route in the evening for 90 days. (Patient taking differently: Inject 21 Units into the skin every evening.)    isosorbide mononitrate (IMDUR) 30 MG 24 hr tablet Take 1 tablet (30 mg total) by mouth once daily.    levETIRAcetam (KEPPRA) 500 MG Tab Take 1 tablet twice a day by oral route for 30 days. (Patient taking differently: Take 500 mg by mouth 2 (two) times daily.)    ondansetron (ZOFRAN-ODT) 4 MG TbDL Place 1 tablet (4 mg total) under the tongue every 8 (eight) hours.    oxyCODONE-acetaminophen (PERCOCET) 5-325 mg per tablet TAKE 1 TABLET EVERY 6 HOURS AS NEEDED FOP PAIN (Patient taking differently: Take 1 tablet by mouth every 6 (six) hours as needed for Pain.)    pantoprazole  (PROTONIX) 40 MG tablet Take 1 tablet (40 mg total) by mouth once daily.    promethazine (PHENERGAN) 25 MG tablet Take 1 tablet (25 mg total) by mouth every 6 (six) hours as needed for 5 days. (Patient taking differently: Take 25 mg by mouth every 6 (six) hours as needed for Nausea.)    sevelamer carbonate (RENVELA) 800 mg Tab Take 2 tablets with meals three times a day (Patient taking differently: Take 1,600 mg by mouth 3 (three) times daily with meals.)    sucralfate (CARAFATE) 100 mg/mL suspension Take 10 mL 4 times a day by oral route before meals for 30 days. (Patient taking differently: Take 1 g by mouth 4 (four) times daily with meals and nightly.)    amLODIPine (NORVASC) 5 MG tablet Take 1 tablet every day by oral route for 90 days.    blood sugar diagnostic (TRUE METRIX GLUCOSE TEST STRIP) Strp Use 1 strip to check blood glucose three times daily    blood-glucose meter (TRUE METRIX GLUCOSE METER) Misc Use to check blood glucose    brimonidine 0.2% (ALPHAGAN) 0.2 % Drop Place 1 drop in right eye twice daily.    carvediloL (COREG) 25 MG tablet Take 1 tablet (25 mg total) by mouth 2 (two) times daily.    ciprofloxacin HCl (CILOXAN) 0.3 % ophthalmic solution instill 1 drop into the right eye 4 times a day for 30 days    FLUoxetine 20 MG capsule Take 1 capsule (20 mg total) by mouth once daily.    gabapentin (NEURONTIN) 300 MG capsule Take 2 capsules (600 mg total) by mouth 2 (two) times daily.    insulin aspart U-100 (NOVOLOG FLEXPEN U-100 INSULIN) 100 unit/mL (3 mL) InPn pen Inject 10 units 3 times a day by subcutaneous route before meals for 90 days.    insulin detemir U-100, Levemir, (LEVEMIR FLEXTOUCH U100 INSULIN) 100 unit/mL (3 mL) InPn pen Inject 18 units every day by subcutaneous route in the evening    insulin detemir U-100, Levemir, (LEVEMIR FLEXTOUCH U100 INSULIN) 100 unit/mL (3 mL) InPn pen Inject 22 units every day by subcutaneous route in the evening for 90 days.    isosorbide mononitrate (IMDUR)  "30 MG 24 hr tablet Take 1 tablet (30 mg total) by mouth once daily.    ketorolac 0.5% (ACULAR) 0.5 % Drop Instill 1 drop into the right eye 4 times a day for 30 days    lancets (TRUEPLUS LANCETS) 33 gauge Misc Use 1 to check blood glucose three times daily    lancets 33 gauge Misc Use to test twice daily    levETIRAcetam (KEPPRA) 500 MG Tab Take 1 tablet (500 mg total) by mouth 2 (two) times daily.    ondansetron (ZOFRAN-ODT) 4 MG TbDL Dissolve 1 tablet (4 mg total) by mouth every 8 (eight) hours.    pen needle, diabetic 31 gauge x 3/16" Ndle Use as directed to inject insulin 5 times daily    prednisoLONE acetate (PRED FORTE) 1 % DrpS Instill 1 drops into the right eye 4 times a day for 30 days    promethazine (PHENERGAN) 25 MG suppository Place 1 suppository (25 mg total) rectally every 6 (six) hours as needed for Nausea.    sucroferric oxyhydroxide (VELPHORO) 500 mg Chew Take 1 tablet 3 times a day (Patient taking differently: Take 1 tablet by mouth 3 (three) times daily.)    timolol maleate 0.5% (TIMOPTIC) 0.5 % Drop Instill 1 drop into the right eye 2 times a day for 30 days    [DISCONTINUED] atenoloL (TENORMIN) 50 MG tablet Take 1 tablet (50 mg total) by mouth every other day.    [DISCONTINUED] fluticasone propionate (FLONASE) 50 mcg/actuation nasal spray [place] 1 spray every day by intranasal route.    [DISCONTINUED] omeprazole (PRILOSEC) 20 MG capsule Take 2 capsules (40 mg total) by mouth once daily. for 10 days    [DISCONTINUED] pantoprazole (PROTONIX) 40 MG tablet Take 1 tablet (40 mg total) by mouth once daily.     Family History       Problem Relation (Age of Onset)    Diabetes Mother, Father          Tobacco Use    Smoking status: Never    Smokeless tobacco: Never   Substance and Sexual Activity    Alcohol use: Not Currently    Drug use: No    Sexual activity: Not Currently     Partners: Male     Birth control/protection: I.U.D.     Review of Systems   Constitutional:  Positive for chills. Negative for " fever.   HENT:  Negative for congestion and rhinorrhea.    Respiratory:  Negative for cough, shortness of breath and wheezing.    Cardiovascular:  Negative for chest pain, palpitations and leg swelling.   Gastrointestinal:  Positive for abdominal pain, nausea and vomiting. Negative for abdominal distention.   Endocrine: Negative for cold intolerance and heat intolerance.   Genitourinary:  Negative for dysuria and urgency.   Musculoskeletal:  Negative for arthralgias and neck stiffness.   Skin:  Negative for rash and wound.   Neurological:  Negative for dizziness and weakness.     Objective:     Vital Signs (Most Recent):  Temp: 99.1 °F (37.3 °C) (12/12/23 0812)  Pulse: 91 (12/12/23 1102)  Resp: 18 (12/12/23 1102)  BP: (!) 165/87 (12/12/23 1102)  SpO2: 95 % (12/12/23 1102) Vital Signs (24h Range):  Temp:  [99.1 °F (37.3 °C)] 99.1 °F (37.3 °C)  Pulse:  [88-99] 91  Resp:  [18-22] 18  SpO2:  [94 %-99 %] 95 %  BP: (116-190)/() 165/87     Weight: 61.2 kg (135 lb)  Body mass index is 24.69 kg/m².     Physical Exam  Constitutional:       Appearance: She is well-developed.      Comments: Appears uncomfortable   HENT:      Head: Normocephalic and atraumatic.   Eyes:      Pupils: Pupils are equal, round, and reactive to light.   Cardiovascular:      Rate and Rhythm: Normal rate and regular rhythm.      Heart sounds: No murmur heard.  Pulmonary:      Effort: Pulmonary effort is normal. No respiratory distress.      Breath sounds: Normal breath sounds. No wheezing or rales.   Abdominal:      General: Bowel sounds are normal. There is no distension.      Palpations: Abdomen is soft.      Tenderness: There is no abdominal tenderness.      Comments: Emesis in bag at bedside   Musculoskeletal:         General: Normal range of motion.   Skin:     General: Skin is warm and dry.      Findings: No rash.   Neurological:      Mental Status: She is alert and oriented to person, place, and time.      Cranial Nerves: No cranial nerve  deficit.   Psychiatric:         Behavior: Behavior normal.              CRANIAL NERVES     CN III, IV, VI   Pupils are equal, round, and reactive to light.       Significant Labs: All pertinent labs within the past 24 hours have been reviewed.    Significant Imaging: I have reviewed all pertinent imaging results/findings within the past 24 hours.  Assessment/Plan:     * Metabolic acidosis  Nephrology consulted for HD      Abdominal pain  Noted. Patient with suspected gastroparesis  Pain control with IV narcotic for now      Hyperkalemia  This patient has hyperkalemia which is uncontrolled. We will monitor for arrhythmias with EKG or continuous telemetry. We will treat the hyperkalemia with HD. The likely etiology of the hyperkalemia is ESRD.  The patients latest potassium has been reviewed and the results are listed below  Recent Labs   Lab 12/12/23  0905   K 5.3*             Anemia in ESRD (end-stage renal disease)  Patient's anemia is currently uncontrolled. Has not received any PRBCs to date. Etiology likely d/t chronic disease due to Chronic Kidney Disease/ESRD  Current CBC reviewed-   Lab Results   Component Value Date    HGB 6.2 (LL) 12/12/2023    HCT 19.1 (LL) 12/12/2023     Monitor serial CBC and transfuse if patient becomes hemodynamically unstable, symptomatic or H/H drops below 7/21.  Transfuse 2 units 12/12    Chronic congestive heart failure with left ventricular diastolic dysfunction  Patient is identified as having Diastolic (HFpEF) heart failure that is Chronic. CHF is currently controlled. Latest ECHO performed and demonstrates- Results for orders placed during the hospital encounter of 09/10/23    Echo    Interpretation Summary    Left Ventricle: The left ventricle is normal in size. Mildly increased ventricular mass. Mildly increased wall thickness. Normal wall motion. There is low normal systolic function with a visually estimated ejection fraction of 50 - 55%. There is normal diastolic  "function.    Right Ventricle: Normal right ventricular cavity size. Wall thickness is normal. Right ventricle wall motion  is normal. Systolic function is normal.    IVC/SVC: Normal venous pressure at 3 mmHg.    Pericardium: There is a small circumferential effusion. Pericardial effusion is echolucent. No indication of cardiac tamponade. Evidence includes no chamber collapse.  . Continue Beta Blocker and ACE/ARB and monitor clinical status closely. Monitor on telemetry. Patient is off CHF pathway.  Monitor strict Is&Os and daily weights.  Place on fluid restriction of 1.5 L. Cardiology has not been consulted. Continue to stress to patient importance of self efficacy and  on diet for CHF. Last BNP reviewed- and noted below No results for input(s): "BNP", "BNPTRIAGEBLO" in the last 168 hours.    Type 2 diabetes mellitus with hyperglycemia, with long-term current use of insulin, previous HbA1c 5.8%  Patient's FSGs are uncontrolled due to hyperglycemia on current medication regimen.  Last A1c reviewed-   Lab Results   Component Value Date    HGBA1C 5.8 08/01/2023     Most recent fingerstick glucose reviewed-   Recent Labs   Lab 12/12/23  0906   POCTGLUCOSE 307*     Current correctional scale  Medium  Maintain anti-hyperglycemic dose as follows-   Antihyperglycemics (From admission, onward)      None          Hold Oral hypoglycemics while patient is in the hospital.        ESRD (end stage renal disease)  Nephrology consulted for HD      VTE Risk Mitigation (From admission, onward)      None               On 12/12/2023, patient should be placed in hospital observation services under my care.             Linda Cueto MD  Department of Hospital Medicine  [facility] - ED          "

## 2023-12-12 NOTE — ASSESSMENT & PLAN NOTE
Patient's FSGs are uncontrolled due to hyperglycemia on current medication regimen.  Last A1c reviewed-   Lab Results   Component Value Date    HGBA1C 5.8 08/01/2023     Most recent fingerstick glucose reviewed-   Recent Labs   Lab 12/12/23  0906   POCTGLUCOSE 307*     Current correctional scale  Medium  Maintain anti-hyperglycemic dose as follows-   Antihyperglycemics (From admission, onward)      None          Hold Oral hypoglycemics while patient is in the hospital.

## 2023-12-12 NOTE — ED NOTES
Patient requesting more pain meds, meds given an hour ago and can not give for 3 more hours, patient informed.

## 2023-12-12 NOTE — PLAN OF CARE
[facility] - ED  Initial Discharge Assessment       Primary Care Provider: Melony Kim NP    Admission Diagnosis: Metabolic acidosis [E87.20]    Admission Date: 12/12/2023  Expected Discharge Date:     Spoke to pt at bedside to complete dc assessment. Verified facesheet. Lives with parents. Has [place] medicaid but lives in [place]. PCP access health. Pharmacy [facility]. DME glucometer. Denies hh/coumadin. HD @ [facility] TTS. Pt without recent admit. Family to provide transport home. CM to follow for dc needs.           Transition of Care Barriers: None    Payor: MEDICAID / Plan: HE[place] BLUE [place] / Product Type: Managed Medicaid /     Extended Emergency Contact Information  Primary Emergency Contact: Tanya Howard  Address: [address] [place], [place] [id number] [facility]  Home Phone: [phone]  Mobile Phone: [phone]  Relation: Step parent  Preferred language: English   needed? No  Secondary Emergency Contact: Garcia Howard  Address: [address] [place] [address]  Mobile Phone: [phone]  Relation: Father  Preferred language: English   needed? No    Discharge Plan A: Home with family  Discharge Plan B: Home      [facility]  [address]  [facility] [address]  Phone: [phone] Fax: [phone]      Initial Assessment (most recent)       Adult Discharge Assessment - 12/12/23 1533          Discharge Assessment    Assessment Type Discharge Planning Assessment     Confirmed/corrected address, phone number and insurance Yes     Confirmed Demographics Correct on Facesheet     Source of Information patient     People in Home parent(s)     Do you expect to return to your current living situation? Yes     Do you have help at home or someone to help you manage your care at home? No     Prior to hospitilization cognitive status: Unable to Assess     Current cognitive status: Alert/Oriented     Walking or Climbing  Stairs Difficulty no     Dressing/Bathing Difficulty no     Equipment Currently Used at Home glucometer     Readmission within 30 days? No     Patient currently being followed by outpatient case management? No     Do you currently have service(s) that help you manage your care at home? No     Do you take prescription medications? Yes     Do you have prescription coverage? Yes     Do you have any problems affording any of your prescribed medications? No     Is the patient taking medications as prescribed? yes     How do you get to doctors appointments? health plan transportation     Are you on dialysis? Yes     Do you take coumadin? No     Discharge Plan A Home with family     Discharge Plan B Home     DME Needed Upon Discharge  none     Discharge Plan discussed with: Patient     Transition of Care Barriers None

## 2023-12-12 NOTE — HPI
Ms. Howard is a 33-year-old female with history of ESRD on HD, suspected gastroparesis, sickle cell trait, hypertension, pericardial effusion, type 2 diabetes who was admitted at [facility] for n/v and was found to be in acidosis, anemic and hyperkalemic- Hd was done yesterday and 2 units were tranfused. Today at the other campus other abdominal pain got worse, LFTs went up and she also had RRT for lethargy, low BP, low BG and for the concern of cholangitis she was transferred here. GI consult was also done there and pending MRCP.       Pt was seen in ICU and was able to give some hx, was telling her abdominal pain is 10/10, sharp happening at r side and its different from when she had gall stones removed in the past. BP stable, pulse regular, Hb is still low- transfusing one more unit and sending for MRCP.

## 2023-12-12 NOTE — PHARMACY MED REC
"Admission Medication History     The home medication history was taken by Jose Maldonado.    You may go to "Admission" then "Reconcile Home Medications" tabs to review and/or act upon these items.     The home medication list has been updated by the Pharmacy department.   Please read ALL comments highlighted in yellow.   Please address this information as you see fit.    Feel free to contact us if you have any questions or require assistance.      Medications listed below were obtained from: Patient/family and Analytic software- [employer]  Current Facility-Administered Medications on File Prior to Encounter   Medication Dose Route Frequency Provider Last Rate Last Admin    0.9%  NaCl infusion (for blood administration)   Intravenous Q24H PRN Geeta Kaur NP   Stopped at 10/27/23 1035     Current Outpatient Medications on File Prior to Encounter   Medication Sig Dispense Refill    amLODIPine (NORVASC) 5 MG tablet Take 1 tablet (5 mg total) by mouth once daily. (Patient taking differently: Take 5 mg by mouth once daily.) 30 tablet 1    apixaban (ELIQUIS) 5 mg Tab Take 1 tablet (5 mg total) by mouth 2 (two) times daily. 180 tablet 1    carvediloL (COREG) 25 MG tablet Take 1 tablet (25 mg total) by mouth 2 (two) times daily. 60 tablet 5    cetirizine (ZYRTEC) 10 MG tablet Take 1 tablet (10 mg total) by mouth once daily. 30 tablet 0    doxycycline (VIBRAMYCIN) 100 MG Cap Take 1 capsule twice a day by oral route for 7 days, for infectoin. (Patient taking differently: Take 100 mg by mouth every 12 (twelve) hours.) 14 capsule 0    FLUoxetine 20 MG capsule Take 1 capsule (20 mg total) by mouth once daily. 30 capsule 5    fluticasone propionate (FLONASE ALLERGY RELIEF) 50 mcg/actuation nasal spray [place] 1 spray every day by intranasal route. (Patient taking differently: 1 spray by Each Nostril route Daily.) 16 g 2    gabapentin (NEURONTIN) 300 MG capsule Take 2 capsules twice a day by oral route for 90 days. (Patient " taking differently: Take 600 mg by mouth 2 (two) times daily.) 360 capsule 1    hydrALAZINE (APRESOLINE) 100 MG tablet Take 1 tablet (100 mg total) by mouth 2 (two) times daily. 180 tablet 1    insulin aspart U-100 (NOVOLOG FLEXPEN U-100 INSULIN) 100 unit/mL (3 mL) InPn pen Inject 8 units 3 times a day by subcutaneous route before meals (Patient taking differently: Inject 8 Units into the skin 3 (three) times daily before meals.) 24 mL 1    insulin detemir U-100, Levemir, (LEVEMIR FLEXTOUCH U100 INSULIN) 100 unit/mL (3 mL) InPn pen Inject 21 units every day by subcutaneous route in the evening for 90 days. (Patient taking differently: Inject 21 Units into the skin every evening.) 15 mL 1    isosorbide mononitrate (IMDUR) 30 MG 24 hr tablet Take 1 tablet (30 mg total) by mouth once daily. 90 tablet 1    levETIRAcetam (KEPPRA) 500 MG Tab Take 1 tablet twice a day by oral route for 30 days. (Patient taking differently: Take 500 mg by mouth 2 (two) times daily.) 60 tablet 2    ondansetron (ZOFRAN-ODT) 4 MG TbDL Place 1 tablet (4 mg total) under the tongue every 8 (eight) hours. 24 tablet 2    oxyCODONE-acetaminophen (PERCOCET) 5-325 mg per tablet TAKE 1 TABLET EVERY 6 HOURS AS NEEDED FOP PAIN (Patient taking differently: Take 1 tablet by mouth every 6 (six) hours as needed for Pain.) 15 tablet 0    pantoprazole (PROTONIX) 40 MG tablet Take 1 tablet (40 mg total) by mouth once daily. 90 tablet 1    promethazine (PHENERGAN) 25 MG tablet Take 1 tablet (25 mg total) by mouth every 6 (six) hours as needed for 5 days. (Patient taking differently: Take 25 mg by mouth every 6 (six) hours as needed for Nausea.) 20 tablet 2    sevelamer carbonate (RENVELA) 800 mg Tab Take 2 tablets with meals three times a day (Patient taking differently: Take 1,600 mg by mouth 3 (three) times daily with meals.) 180 tablet 11    sucralfate (CARAFATE) 100 mg/mL suspension Take 10 mL 4 times a day by oral route before meals for 30 days. (Patient  taking differently: Take 1 g by mouth 4 (four) times daily with meals and nightly.) 1200 mL 1    amLODIPine (NORVASC) 5 MG tablet Take 1 tablet every day by oral route for 90 days. 90 tablet 1    blood sugar diagnostic (TRUE METRIX GLUCOSE TEST STRIP) Strp Use 1 strip to check blood glucose three times daily 100 each 11    blood-glucose meter (TRUE METRIX GLUCOSE METER) Misc Use to check blood glucose 1 each 0    brimonidine 0.2% (ALPHAGAN) 0.2 % Drop Place 1 drop in right eye twice daily. 10 mL 5    carvediloL (COREG) 25 MG tablet Take 1 tablet (25 mg total) by mouth 2 (two) times daily. 60 tablet 2    ciprofloxacin HCl (CILOXAN) 0.3 % ophthalmic solution instill 1 drop into the right eye 4 times a day for 30 days 5 mL 0    FLUoxetine 20 MG capsule Take 1 capsule (20 mg total) by mouth once daily. 90 capsule 1    gabapentin (NEURONTIN) 300 MG capsule Take 2 capsules (600 mg total) by mouth 2 (two) times daily. 360 capsule 1    insulin aspart U-100 (NOVOLOG FLEXPEN U-100 INSULIN) 100 unit/mL (3 mL) InPn pen Inject 10 units 3 times a day by subcutaneous route before meals for 90 days. 27 mL 2    insulin detemir U-100, Levemir, (LEVEMIR FLEXTOUCH U100 INSULIN) 100 unit/mL (3 mL) InPn pen Inject 18 units every day by subcutaneous route in the evening 18 mL 1    insulin detemir U-100, Levemir, (LEVEMIR FLEXTOUCH U100 INSULIN) 100 unit/mL (3 mL) InPn pen Inject 22 units every day by subcutaneous route in the evening for 90 days. 18 mL 1    isosorbide mononitrate (IMDUR) 30 MG 24 hr tablet Take 1 tablet (30 mg total) by mouth once daily. 30 tablet 1    ketorolac 0.5% (ACULAR) 0.5 % Drop Instill 1 drop into the right eye 4 times a day for 30 days 3 mL 5    lancets (TRUEPLUS LANCETS) 33 gauge Misc Use 1 to check blood glucose three times daily 100 each 11    lancets 33 gauge Misc Use to test twice daily 100 each 5    levETIRAcetam (KEPPRA) 500 MG Tab Take 1 tablet (500 mg total) by mouth 2 (two) times daily. 60 tablet 2     "ondansetron (ZOFRAN-ODT) 4 MG TbDL Dissolve 1 tablet (4 mg total) by mouth every 8 (eight) hours. 24 tablet 2    pen needle, diabetic 31 gauge x 3/16" Ndle Use as directed to inject insulin 5 times daily 100 each 11    prednisoLONE acetate (PRED FORTE) 1 % DrpS Instill 1 drops into the right eye 4 times a day for 30 days 5 mL 1    promethazine (PHENERGAN) 25 MG suppository Place 1 suppository (25 mg total) rectally every 6 (six) hours as needed for Nausea. 10 suppository 0    sucroferric oxyhydroxide (VELPHORO) 500 mg Chew Take 1 tablet 3 times a day (Patient taking differently: Take 1 tablet by mouth 3 (three) times daily.) 90 tablet 6    timolol maleate 0.5% (TIMOPTIC) 0.5 % Drop Instill 1 drop into the right eye 2 times a day for 30 days 5 mL 6    [DISCONTINUED] atenoloL (TENORMIN) 50 MG tablet Take 1 tablet (50 mg total) by mouth every other day. 45 tablet 3    [DISCONTINUED] fluticasone propionate (FLONASE) 50 mcg/actuation nasal spray [place] 1 spray every day by intranasal route. 16 g 0    [DISCONTINUED] omeprazole (PRILOSEC) 20 MG capsule Take 2 capsules (40 mg total) by mouth once daily. for 10 days 20 capsule 0    [DISCONTINUED] pantoprazole (PROTONIX) 40 MG tablet Take 1 tablet (40 mg total) by mouth once daily.             Jose Maldonado  EXT 1925                .          "

## 2023-12-12 NOTE — CONSULTS
INPATIENT NEPHROLOGY CONSULT   [facility]    Patient Name: Tabby Howard  Date: 12/12/2023    Reason for consultation: ESRD    Chief Complaint:   Chief Complaint   Patient presents with    Abdominal Pain     Beginning this AM with abdominal distention, dialysis treatment on Saturday       History of Present Illness:  Patient is a 33-year-old female with a past medical history of end-stage renal disease on hemodialysis TTS, chronic gastroparesis SVT hyperkalemia hypertensive emergency type 2 diabetes with diabetic gastroparesis pericardial effusion who presents the emergency room for evaluation of nausea and vomiting that started earlier this morning.  It is in her midepigastric region.  She has nausea and vomiting.  It feels similar to her gastroparesis in the past.  She denies any fevers chills cough cold congestion runny nose sore throat.  She does not make urine.  She does not feel like a tearing or ripping sensation in her back.  She has nausea medicine home but no pain medicines.  None of her home medications are working. We are consulted for dialysis.     Interval History:  12/12- hypertensive, on RA, Hb 6s- will transfuse 2u of blood with HD today    Plan of Care:    Assessment:  ESRD on HD TTS  HTN urgency  Hyperkalemia/Acidosis  SHPT  Anemia of CKD     Plan:     - HD today  - resume home BP meds  - UF 3-4L  - renal diet, 1.5L fluid restriction  - resume binder with meals  - transfuse 2u of blood with HD today- on mircera as outpt    Thank you for allowing us to participate in this patient's care. We will continue to follow.    Vital Signs:  Temp Readings from Last 3 Encounters:   12/12/23 99.1 °F (37.3 °C) (Oral)   10/27/23 98.9 °F (37.2 °C) (Oral)   10/18/23 97.6 °F (36.4 °C)       Pulse Readings from Last 3 Encounters:   12/12/23 91   10/27/23 88   10/18/23 80       BP Readings from Last 3 Encounters:   12/12/23 (!) 165/87   10/27/23 137/80   10/18/23 (!) 140/91       Weight:  Wt  Readings from Last 3 Encounters:   23 61.2 kg (135 lb)   10/18/23 61.8 kg (136 lb 3.9 oz)   10/13/23 59.4 kg (130 lb 15.3 oz)       Past Medical & Surgical History:  Past Medical History:   Diagnosis Date    ESRD on hemodialysis 2022    Gastritis 2022    EGD was 22    Gastroparesis 2022    has not had Emptying study    Heart failure with preserved ejection fraction 2022    EF 55% on 3/22    History of supraventricular tachycardia     Hyperkalemia 2022    Hypertensive emergency 2022    Sickle cell trait 2022    Type 2 diabetes mellitus        Past Surgical History:   Procedure Laterality Date     SECTION      x 3    COLONOSCOPY      COLONOSCOPY N/A 2022    Procedure: COLONOSCOPY;  Surgeon: Jagdeep Cedeno MD;  Location: [facility];  Service: Endoscopy;  Laterality: N/A;    ESOPHAGOGASTRODUODENOSCOPY N/A 10/18/2019    Procedure: ESOPHAGOGASTRODUODENOSCOPY (EGD);  Surgeon: Gianluca Mendez MD;  Location: [facility];  Service: Endoscopy;  Laterality: N/A;    ESOPHAGOGASTRODUODENOSCOPY N/A 2022    Procedure: EGD (ESOPHAGOGASTRODUODENOSCOPY);  Surgeon: Micky Paredes III, MD;  Location: [facility];  Service: Endoscopy;  Laterality: N/A;    ESOPHAGOGASTRODUODENOSCOPY N/A 2022    Procedure: EGD (ESOPHAGOGASTRODUODENOSCOPY);  Surgeon: Marcelo Zhong MD;  Location: [facility];  Service: Endoscopy;  Laterality: N/A;    INSERTION, CATHETER, TUNNELED N/A 2023    Procedure: Insertion,catheter,tunneled;  Surgeon: Carlos Thurman Jr., MD;  Location: [facility];  Service: General;  Laterality: N/A;    LAPAROSCOPIC CHOLECYSTECTOMY N/A 2022    Procedure: CHOLECYSTECTOMY, LAPAROSCOPIC;  Surgeon: Grey Perez MD;  Location: [facility] OR;  Service: General;  Laterality: N/A;    PLACEMENT OF DUAL-LUMEN VASCULAR CATHETER Left 2022    Procedure: INSERTION-CATHETER-JOSEPH;  Surgeon: Dionte Gan MD;  Location: [facility];  Service: General;   Laterality: Left;    PLACEMENT OF DUAL-LUMEN VASCULAR CATHETER Right 07/26/2022    Procedure: INSERTION-CATHETER-Hemosplit;  Surgeon: Dionte Gan MD;  Location: [facility];  Service: General;  Laterality: Right;       Past Social History:  Social History     Socioeconomic History    Marital status:    Tobacco Use    Smoking status: Never    Smokeless tobacco: Never   Substance and Sexual Activity    Alcohol use: Not Currently    Drug use: No    Sexual activity: Not Currently     Partners: Male     Birth control/protection: I.U.D.     Social Determinants of Health     Financial Resource Strain: Low Risk  (5/16/2023)    Overall Financial Resource Strain (CARDIA)     Difficulty of Paying Living Expenses: Not very hard   Food Insecurity: No Food Insecurity (5/16/2023)    Hunger Vital Sign     Worried About Running Out of Food in the Last Year: Never true     Ran Out of Food in the Last Year: Never true   Transportation Needs: No Transportation Needs (5/16/2023)    PRAPARE - Transportation     Lack of Transportation (Medical): No     Lack of Transportation (Non-Medical): No   Physical Activity: Inactive (5/16/2023)    Exercise Vital Sign     Days of Exercise per Week: 0 days     Minutes of Exercise per Session: 0 min   Stress: No Stress Concern Present (5/16/2023)    [employer] [place] [employer] - Occupational Stress Questionnaire     Feeling of Stress : Not at all   Social Connections: Unknown (5/16/2023)    Social Connection and Isolation Panel [NHANES]     Frequency of Communication with Friends and Family: More than three times a week     Frequency of Social Gatherings with Friends and Family: More than three times a week     Attends Church Services: Never     Active Member of Clubs or Organizations: No     Attends Club or Organization Meetings: Never     Marital Status: Patient refused   Housing Stability: Low Risk  (5/16/2023)    Housing Stability Vital Sign     Unable to Pay for Housing in  the Last Year: No     Number of Places Lived in the Last Year: 1     Unstable Housing in the Last Year: No       Medications:  Scheduled Meds:   diphenhydrAMINE  25 mg Intravenous ED 1 Time    promethazine (PHENERGAN) 25 mg in dextrose 5 % (D5W) 50 mL IVPB  25 mg Intravenous ED 1 Time     Continuous Infusions:  PRN Meds:.0.9%  NaCl infusion (for blood administration), 0.9%  NaCl infusion (for blood administration)  Current Facility-Administered Medications on File Prior to Encounter   Medication Dose Route Frequency Provider Last Rate Last Admin    0.9%  NaCl infusion (for blood administration)   Intravenous Q24H PRN Geeta Kaur NP   Stopped at 10/27/23 1035     Current Outpatient Medications on File Prior to Encounter   Medication Sig Dispense Refill    amLODIPine (NORVASC) 5 MG tablet Take 1 tablet (5 mg total) by mouth once daily. (Patient taking differently: Take 5 mg by mouth once daily.) 30 tablet 1    apixaban (ELIQUIS) 5 mg Tab Take 1 tablet (5 mg total) by mouth 2 (two) times daily. 180 tablet 1    carvediloL (COREG) 25 MG tablet Take 1 tablet (25 mg total) by mouth 2 (two) times daily. 60 tablet 5    cetirizine (ZYRTEC) 10 MG tablet Take 1 tablet (10 mg total) by mouth once daily. 30 tablet 0    doxycycline (VIBRAMYCIN) 100 MG Cap Take 1 capsule twice a day by oral route for 7 days, for infectoin. (Patient taking differently: Take 100 mg by mouth every 12 (twelve) hours.) 14 capsule 0    FLUoxetine 20 MG capsule Take 1 capsule (20 mg total) by mouth once daily. 30 capsule 5    fluticasone propionate (FLONASE ALLERGY RELIEF) 50 mcg/actuation nasal spray [place] 1 spray every day by intranasal route. (Patient taking differently: 1 spray by Each Nostril route Daily.) 16 g 2    gabapentin (NEURONTIN) 300 MG capsule Take 2 capsules twice a day by oral route for 90 days. (Patient taking differently: Take 600 mg by mouth 2 (two) times daily.) 360 capsule 1    hydrALAZINE (APRESOLINE) 100 MG tablet Take 1  tablet (100 mg total) by mouth 2 (two) times daily. 180 tablet 1    insulin aspart U-100 (NOVOLOG FLEXPEN U-100 INSULIN) 100 unit/mL (3 mL) InPn pen Inject 8 units 3 times a day by subcutaneous route before meals (Patient taking differently: Inject 8 Units into the skin 3 (three) times daily before meals.) 24 mL 1    insulin detemir U-100, Levemir, (LEVEMIR FLEXTOUCH U100 INSULIN) 100 unit/mL (3 mL) InPn pen Inject 21 units every day by subcutaneous route in the evening for 90 days. (Patient taking differently: Inject 21 Units into the skin every evening.) 15 mL 1    isosorbide mononitrate (IMDUR) 30 MG 24 hr tablet Take 1 tablet (30 mg total) by mouth once daily. 90 tablet 1    levETIRAcetam (KEPPRA) 500 MG Tab Take 1 tablet twice a day by oral route for 30 days. (Patient taking differently: Take 500 mg by mouth 2 (two) times daily.) 60 tablet 2    ondansetron (ZOFRAN-ODT) 4 MG TbDL Place 1 tablet (4 mg total) under the tongue every 8 (eight) hours. 24 tablet 2    oxyCODONE-acetaminophen (PERCOCET) 5-325 mg per tablet TAKE 1 TABLET EVERY 6 HOURS AS NEEDED FOP PAIN (Patient taking differently: Take 1 tablet by mouth every 6 (six) hours as needed for Pain.) 15 tablet 0    pantoprazole (PROTONIX) 40 MG tablet Take 1 tablet (40 mg total) by mouth once daily. 90 tablet 1    promethazine (PHENERGAN) 25 MG tablet Take 1 tablet (25 mg total) by mouth every 6 (six) hours as needed for 5 days. (Patient taking differently: Take 25 mg by mouth every 6 (six) hours as needed for Nausea.) 20 tablet 2    sevelamer carbonate (RENVELA) 800 mg Tab Take 2 tablets with meals three times a day (Patient taking differently: Take 1,600 mg by mouth 3 (three) times daily with meals.) 180 tablet 11    sucralfate (CARAFATE) 100 mg/mL suspension Take 10 mL 4 times a day by oral route before meals for 30 days. (Patient taking differently: Take 1 g by mouth 4 (four) times daily with meals and nightly.) 1200 mL 1    amLODIPine (NORVASC) 5 MG  tablet Take 1 tablet every day by oral route for 90 days. 90 tablet 1    blood sugar diagnostic (TRUE METRIX GLUCOSE TEST STRIP) Strp Use 1 strip to check blood glucose three times daily 100 each 11    blood-glucose meter (TRUE METRIX GLUCOSE METER) Misc Use to check blood glucose 1 each 0    brimonidine 0.2% (ALPHAGAN) 0.2 % Drop Place 1 drop in right eye twice daily. 10 mL 5    carvediloL (COREG) 25 MG tablet Take 1 tablet (25 mg total) by mouth 2 (two) times daily. 60 tablet 2    ciprofloxacin HCl (CILOXAN) 0.3 % ophthalmic solution instill 1 drop into the right eye 4 times a day for 30 days 5 mL 0    FLUoxetine 20 MG capsule Take 1 capsule (20 mg total) by mouth once daily. 90 capsule 1    gabapentin (NEURONTIN) 300 MG capsule Take 2 capsules (600 mg total) by mouth 2 (two) times daily. 360 capsule 1    insulin aspart U-100 (NOVOLOG FLEXPEN U-100 INSULIN) 100 unit/mL (3 mL) InPn pen Inject 10 units 3 times a day by subcutaneous route before meals for 90 days. 27 mL 2    insulin detemir U-100, Levemir, (LEVEMIR FLEXTOUCH U100 INSULIN) 100 unit/mL (3 mL) InPn pen Inject 18 units every day by subcutaneous route in the evening 18 mL 1    insulin detemir U-100, Levemir, (LEVEMIR FLEXTOUCH U100 INSULIN) 100 unit/mL (3 mL) InPn pen Inject 22 units every day by subcutaneous route in the evening for 90 days. 18 mL 1    isosorbide mononitrate (IMDUR) 30 MG 24 hr tablet Take 1 tablet (30 mg total) by mouth once daily. 30 tablet 1    ketorolac 0.5% (ACULAR) 0.5 % Drop Instill 1 drop into the right eye 4 times a day for 30 days 3 mL 5    lancets (TRUEPLUS LANCETS) 33 gauge Misc Use 1 to check blood glucose three times daily 100 each 11    lancets 33 gauge Misc Use to test twice daily 100 each 5    levETIRAcetam (KEPPRA) 500 MG Tab Take 1 tablet (500 mg total) by mouth 2 (two) times daily. 60 tablet 2    ondansetron (ZOFRAN-ODT) 4 MG TbDL Dissolve 1 tablet (4 mg total) by mouth every 8 (eight) hours. 24 tablet 2    pen  "needle, diabetic 31 gauge x 3/16" Ndle Use as directed to inject insulin 5 times daily 100 each 11    prednisoLONE acetate (PRED FORTE) 1 % DrpS Instill 1 drops into the right eye 4 times a day for 30 days 5 mL 1    promethazine (PHENERGAN) 25 MG suppository Place 1 suppository (25 mg total) rectally every 6 (six) hours as needed for Nausea. 10 suppository 0    sucroferric oxyhydroxide (VELPHORO) 500 mg Chew Take 1 tablet 3 times a day (Patient taking differently: Take 1 tablet by mouth 3 (three) times daily.) 90 tablet 6    timolol maleate 0.5% (TIMOPTIC) 0.5 % Drop Instill 1 drop into the right eye 2 times a day for 30 days 5 mL 6    [DISCONTINUED] atenoloL (TENORMIN) 50 MG tablet Take 1 tablet (50 mg total) by mouth every other day. 45 tablet 3    [DISCONTINUED] fluticasone propionate (FLONASE) 50 mcg/actuation nasal spray [place] 1 spray every day by intranasal route. 16 g 0    [DISCONTINUED] omeprazole (PRILOSEC) 20 MG capsule Take 2 capsules (40 mg total) by mouth once daily. for 10 days 20 capsule 0    [DISCONTINUED] pantoprazole (PROTONIX) 40 MG tablet Take 1 tablet (40 mg total) by mouth once daily.         Allergies:  Penicillins    Past Family History:  Reviewed; refer to Hospitalist Admission Note    Review of Systems:  Review of Systems - All 14 systems reviewed and negative, except as noted in HPI    Physical Exam:  General Appearance:    NAD, AAO x 3, cooperative, appears stated age   Head:    Normocephalic, atraumatic   Eyes:    PER, EOMI, and conjunctiva/sclera clear bilaterally       Mouth:   Moist mucus membranes, no thrush or oral lesions,       normal dentition   Back:     No CVA tenderness   Lungs:     Clear to auscultation bilaterally, no wheezes, crackles,           rales or rhonchi, symmetric air movement, respirations unlabored   Chest wall:    No tenderness or deformity   Heart:    Regular rate and rhythm, S1 and S2 normal, no murmur, rub   or gallop   Abdomen:     Soft, non-tender, " non-distended, bowel sounds active all four   quadrants, no RT or guarding, no masses, no organomegaly   Extremities:   Warm and well perfused, distal pulses are intact, no             cyanosis or peripheral edema   MSK:   No joint or muscle swelling, tenderness or deformity   Skin:   Skin color, texture, turgor normal, no rashes or lesions   Neurologic/Psychiatric:   CNII-XII intact, normal strength and sensation       throughout, no asterixis; normal affect, memory, judgement     and insight      Results:  Lab Results   Component Value Date     12/12/2023    K 5.3 (H) 12/12/2023     12/12/2023    CO2 18 (L) 12/12/2023    BUN 54 (H) 12/12/2023    CREATININE 10.4 (H) 12/12/2023    CALCIUM 7.6 (L) 12/12/2023    ANIONGAP 19 (H) 12/12/2023    ESTGFRAFRICA 19 (L) 09/03/2022    EGFRNONAA 18 (A) 07/31/2022       Lab Results   Component Value Date    CALCIUM 7.6 (L) 12/12/2023    PHOS 4.5 10/16/2023       Recent Labs   Lab 12/12/23  0905   WBC 5.70   RBC 2.14*   HGB 6.2*   HCT 19.1*   *   MCV 89   MCH 29.0   MCHC 32.5       I have personally reviewed pertinent radiological imaging and reports.    I have spent > 70 minutes providing care for this patient for the above diagnoses. These services have included chart/data/imaging review, evaluation, exam, formulation of plan, , note preparation, and discussions with staff involved in this patient's care.    Prateek Clark MD MPH  [facility]  [address] [place] [id number]  [phone] (p)  [phone] (f)

## 2023-12-12 NOTE — ASSESSMENT & PLAN NOTE
This patient has hyperkalemia which is uncontrolled. We will monitor for arrhythmias with EKG or continuous telemetry. We will treat the hyperkalemia with HD. The likely etiology of the hyperkalemia is ESRD.  The patients latest potassium has been reviewed and the results are listed below  Recent Labs   Lab 12/12/23  0905   K 5.3*

## 2023-12-12 NOTE — ED PROVIDER NOTES
Encounter Date: 2023       History     Chief Complaint   Patient presents with    Abdominal Pain     Beginning this AM with abdominal distention, dialysis treatment on Saturday     Patient is a 33-year-old female with a past medical history of end-stage renal disease on hemodialysis gastroparesis SVT hyperkalemia hypertensive emergency type 2 diabetes with diabetic gastroparesis pericardial effusion who presents the emergency room for evaluation of nausea and vomiting that started earlier this morning.  It is in her midepigastric region.  She has nausea and vomiting.  It feels similar to her gastroparesis in the past.  She denies any fevers chills cough cold congestion runny nose sore throat.  She does not make urine.  She does not feel like a tearing or ripping sensation in her back.  She has nausea medicine home but no pain medicines.  None of her home medications are working.      Review of patient's allergies indicates:   Allergen Reactions    Penicillins Hives     Past Medical History:   Diagnosis Date    ESRD on hemodialysis 2022    Gastritis 2022    EGD was 21    Gastroparesis 2022    has not had Emptying study    Heart failure with preserved ejection fraction 2022    EF 55% on 3/22    History of supraventricular tachycardia     Hyperkalemia 2022    Hypertensive emergency 2022    Sickle cell trait 2022    Type 2 diabetes mellitus      Past Surgical History:   Procedure Laterality Date     SECTION      x 3    COLONOSCOPY      COLONOSCOPY N/A 2022    Procedure: COLONOSCOPY;  Surgeon: Jagdeep Cedeno MD;  Location: [facility];  Service: Endoscopy;  Laterality: N/A;    ESOPHAGOGASTRODUODENOSCOPY N/A 10/18/2019    Procedure: ESOPHAGOGASTRODUODENOSCOPY (EGD);  Surgeon: Gianluca Mendez MD;  Location: [facility];  Service: Endoscopy;  Laterality: N/A;    ESOPHAGOGASTRODUODENOSCOPY N/A 2022    Procedure: EGD (ESOPHAGOGASTRODUODENOSCOPY);  Surgeon:  Micky Paredes III, MD;  Location: [facility] ENDO;  Service: Endoscopy;  Laterality: N/A;    ESOPHAGOGASTRODUODENOSCOPY N/A 12/5/2022    Procedure: EGD (ESOPHAGOGASTRODUODENOSCOPY);  Surgeon: Marcelo Zhong MD;  Location: [facility] ENDO;  Service: Endoscopy;  Laterality: N/A;    INSERTION, CATHETER, TUNNELED N/A 6/17/2023    Procedure: Insertion,catheter,tunneled;  Surgeon: Carlos Thurman Jr., MD;  Location: [facility] OR;  Service: General;  Laterality: N/A;    LAPAROSCOPIC CHOLECYSTECTOMY N/A 07/30/2022    Procedure: CHOLECYSTECTOMY, LAPAROSCOPIC;  Surgeon: Grey Perez MD;  Location: [facility] OR;  Service: General;  Laterality: N/A;    PLACEMENT OF DUAL-LUMEN VASCULAR CATHETER Left 07/12/2022    Procedure: INSERTION-CATHETER-JOSEPH;  Surgeon: Dionte Gan MD;  Location: [facility] OR;  Service: General;  Laterality: Left;    PLACEMENT OF DUAL-LUMEN VASCULAR CATHETER Right 07/26/2022    Procedure: INSERTION-CATHETER-Hemosplit;  Surgeon: Dionte Gan MD;  Location: [facility] OR;  Service: General;  Laterality: Right;     Family History   Problem Relation Age of Onset    Diabetes Mother     Diabetes Father      Social History     Tobacco Use    Smoking status: Never    Smokeless tobacco: Never   Substance Use Topics    Alcohol use: Not Currently    Drug use: No     Review of Systems   Constitutional:  Negative for fever.   HENT:  Negative for congestion, ear pain, rhinorrhea and sore throat.    Eyes:  Negative for pain and visual disturbance.   Respiratory:  Negative for cough, chest tightness and shortness of breath.    Cardiovascular:  Negative for chest pain.   Gastrointestinal:  Positive for abdominal pain and nausea. Negative for diarrhea and vomiting.   Genitourinary:  Negative for difficulty urinating.   Musculoskeletal:  Negative for arthralgias.   Skin:  Negative for rash.   Neurological:  Negative for weakness, numbness and headaches.   All other systems reviewed and are negative.      Physical Exam     Initial  Vitals [12/12/23 0812]   BP Pulse Resp Temp SpO2   (!) 148/102 95 20 99.1 °F (37.3 °C) 96 %      MAP       --         Physical Exam    Nursing note and vitals reviewed.  Constitutional: She appears well-developed and well-nourished.  Non-toxic appearance. No distress.   HENT:   Head: Normocephalic and atraumatic.   Mouth/Throat: Oropharynx is clear and moist.   Eyes: Conjunctivae and EOM are normal. Pupils are equal, round, and reactive to light.   Neck: Neck supple.   Normal range of motion.  Cardiovascular:  Normal rate, regular rhythm, normal heart sounds and intact distal pulses.     Exam reveals no gallop and no friction rub.       No murmur heard.  Pulmonary/Chest: Breath sounds normal. No respiratory distress. She has no decreased breath sounds. She has no wheezes. She has no rhonchi. She has no rales.   Abdominal: Abdomen is soft. Bowel sounds are normal. She exhibits no distension. There is abdominal tenderness (Mild midepigastric abdominal tenderness.). There is no rebound and no guarding.   Musculoskeletal:         General: No edema. Normal range of motion.      Cervical back: Normal range of motion and neck supple.     Neurological: She is alert and oriented to person, place, and time. She has normal strength. No sensory deficit.   Skin: Skin is warm and dry. No rash noted.   Psychiatric: She has a normal mood and affect.         ED Course   Procedures  Labs Reviewed   CBC W/ AUTO DIFFERENTIAL - Abnormal; Notable for the following components:       Result Value    RBC 2.14 (*)     Hemoglobin 6.2 (*)     Hematocrit 19.1 (*)     RDW 21.0 (*)     Platelets 102 (*)     Lymph # 0.5 (*)     Gran % 83.2 (*)     Lymph % 8.2 (*)     All other components within normal limits    Narrative:     Hemoglobin and hematrocit critical result(s) called and verbal   readback obtained from Danii Donovan by GREGORY 12/12/2023 09:17   COMPREHENSIVE METABOLIC PANEL - Abnormal; Notable for the following components:    Potassium 5.3  (*)     CO2 18 (*)     Glucose 275 (*)     BUN 54 (*)     Creatinine 10.4 (*)     Calcium 7.6 (*)     Albumin 3.3 (*)     Alkaline Phosphatase 173 (*)     AST 87 (*)     ALT 73 (*)     eGFR 5 (*)     Anion Gap 19 (*)     All other components within normal limits   POCT GLUCOSE - Abnormal; Notable for the following components:    POCT Glucose 307 (*)     All other components within normal limits   BETA - HYDROXYBUTYRATE, SERUM   LIPASE   POCT GLUCOSE MONITORING CONTINUOUS          Imaging Results    None          Medications   morphine injection 6 mg (6 mg Intravenous Given 12/12/23 0930)   ondansetron injection 8 mg (8 mg Intravenous Given 12/12/23 0929)     Medical Decision Making  Patient has midepigastric abdominal pain nausea vomiting.  Differential is listening ED course.  Patient will be admitted to hospital for diabetic gastroparesis.  I do not think she needs emergent imaging at this time.  She will be dialyzed.  She is not in DKA.  She is a brittle diabetic and frequently gets hypotensive.  Hospitalist will place orders for glucose control and will see the patient in the ER.    Amount and/or Complexity of Data Reviewed  Labs: ordered.    Risk  Prescription drug management.               ED Course as of 12/12/23 1039   Tue Dec 12, 2023   0901 EKG independently interpreted by me as rate 95 normal sinus rhythm left axis deviation no significant ST segment elevation or depression.  No peaked T-waves [JS]   1004 Potassium is 5.3.  Patient is due for hemodialysis today.  She has uncontrolled hyperglycemia high anion gap metabolic acidosis but thankfully no evidence of ketoacidosis. [JS]   1004 Patient is becoming more anemic.  Unclear if she has anemia of chronic disease or she is having chronic GI bleed.  Will need further evaluation.  She may need a blood transfusion.  She will need dialysis today as well as control of her nausea and vomiting which is likely chronic related to gastroparesis.  Lipase is within  normal limits. [JS]   1006 Patient has had almost 6 CT scans in the past 6 months on a monthly basis for abdominal pain.  They do not show any acute intra-abdominal process.  She has a known pericardial effusion.  She does have nephrolithiasis.  She does not make urine.  Ureterolithiasis is a possibility but will get renal ultrasounds to evaluate.  I do not think she is in DKA at this time.  I suspect this is from diabetic gastroparesis. [JS]   1007 Differential diagnosis includes diabetic gastroparesis which is most likely.  Pancreatitis bowel obstruction perforated viscus appendicitis GERD reflux dissection aneurysm acute coronary syndrome pulmonary embolism.  Given no significant hypoxia, baseline EKG, labs relatively stable at baseline I do not think the above are occurring.  I think patient can be admitted with treatment for diabetic gastroparesis and monitoring.  If she decompensates further imaging can be obtained at that time.  I will not CT scan given multiple prior CT scans which are noted above [JS]   1008 Patient will need dialysis today and will have hospitalist contact Nephrology. [JS]   1035 The nephrologist knows the patient well I will put in orders for dialysis.  Hospitalist will admit the patient for persistent [JS]      ED Course User Index  [JS] Nura Grant MD                           Clinical Impression:  Final diagnoses:  [R10.9] Abdominal pain  [E11.43, K31.84] Diabetic gastroparesis (Primary)  [E87.5] Hyperkalemia  [N18.6, Z99.2] ESRD on hemodialysis                 Nura Grant MD  12/12/23 0219

## 2023-12-12 NOTE — ED NOTES
Assumed care:  Tabby Howard is awake, alert and oriented x 3, skin warm and dry.  Patient CO abd pain with N&V that started last night.    Patient identifiers for Tabby Howard checked and correct.  LOC:  Tabby Howard is awake, alert, and aware of environment with an appropriate affect. She is oriented x 3 and speaking appropriately.  APPEARANCE:  She is resting comfortably and in no acute distress. She is clean and well groomed, patient's clothing is properly fastened.  SKIN:  The skin is warm and dry. She has normal skin turgor and moist mucus membranes. Skin is intact; no bruising or breakdown noted.  MUSCULOSKELETAL:  She is moving all extremities well, no obvious deformities noted. Pulses intact.   RESPIRATORY:  Airway is open and patent. Respirations are spontaneous and non-labored with normal effort and rate.  CARDIAC:  She has a normal rate and rhythm. No peripheral edema noted. Capillary refill < 3 seconds.  ABDOMEN:  No distention noted.  Soft and non-tender upon palpation.  Abd pain, N&V  NEUROLOGICAL:  PERRL. Facial expression is symmetrical. Hand grasps are equal bilaterally. Normal sensation in all extremities when touched with finger.  Allergies reported:    Review of patient's allergies indicates:   Allergen Reactions    Penicillins Hives

## 2023-12-12 NOTE — ED NOTES
Patient had been requesting pain medication, went to give pain meds and patient was sleeping and BP low.  Will hold pain meds until patient wakes.

## 2023-12-12 NOTE — ASSESSMENT & PLAN NOTE
"Patient is identified as having Diastolic (HFpEF) heart failure that is Chronic. CHF is currently controlled. Latest ECHO performed and demonstrates- Results for orders placed during the hospital encounter of 09/10/23    Echo    Interpretation Summary    Left Ventricle: The left ventricle is normal in size. Mildly increased ventricular mass. Mildly increased wall thickness. Normal wall motion. There is low normal systolic function with a visually estimated ejection fraction of 50 - 55%. There is normal diastolic function.    Right Ventricle: Normal right ventricular cavity size. Wall thickness is normal. Right ventricle wall motion  is normal. Systolic function is normal.    IVC/SVC: Normal venous pressure at 3 mmHg.    Pericardium: There is a small circumferential effusion. Pericardial effusion is echolucent. No indication of cardiac tamponade. Evidence includes no chamber collapse.  . Continue Beta Blocker and ACE/ARB and monitor clinical status closely. Monitor on telemetry. Patient is off CHF pathway.  Monitor strict Is&Os and daily weights.  Place on fluid restriction of 1.5 L. Cardiology has not been consulted. Continue to stress to patient importance of self efficacy and  on diet for CHF. Last BNP reviewed- and noted below No results for input(s): "BNP", "BNPTRIAGEBLO" in the last 168 hours.  "

## 2023-12-13 LAB
ABO + RH BLD: NORMAL
ALBUMIN SERPL BCP-MCNC: 3 G/DL (ref 3.5–5.2)
ALBUMIN SERPL BCP-MCNC: 3.7 G/DL (ref 3.5–5.2)
ALP SERPL-CCNC: 206 U/L (ref 55–135)
ALP SERPL-CCNC: 240 U/L (ref 55–135)
ALT SERPL W/O P-5'-P-CCNC: 137 U/L (ref 10–44)
ALT SERPL W/O P-5'-P-CCNC: 174 U/L (ref 10–44)
ANION GAP SERPL CALC-SCNC: 21 MMOL/L (ref 8–16)
ANION GAP SERPL CALC-SCNC: 24 MMOL/L (ref 8–16)
AST SERPL-CCNC: 146 U/L (ref 10–40)
AST SERPL-CCNC: 175 U/L (ref 10–40)
BASOPHILS # BLD AUTO: 0.02 K/UL (ref 0–0.2)
BASOPHILS # BLD AUTO: 0.05 K/UL (ref 0–0.2)
BASOPHILS NFR BLD: 0.2 % (ref 0–1.9)
BASOPHILS NFR BLD: 0.4 % (ref 0–1.9)
BILIRUB SERPL-MCNC: 3.3 MG/DL (ref 0.1–1)
BILIRUB SERPL-MCNC: 4 MG/DL (ref 0.1–1)
BLD GP AB SCN CELLS X3 SERPL QL: NORMAL
BLD PROD TYP BPU: NORMAL
BLD PROD TYP BPU: NORMAL
BLOOD UNIT EXPIRATION DATE: NORMAL
BLOOD UNIT EXPIRATION DATE: NORMAL
BLOOD UNIT TYPE CODE: 600
BLOOD UNIT TYPE CODE: 600
BLOOD UNIT TYPE: NORMAL
BLOOD UNIT TYPE: NORMAL
BUN SERPL-MCNC: 32 MG/DL (ref 6–20)
BUN SERPL-MCNC: 52 MG/DL (ref 6–20)
CALCIUM SERPL-MCNC: 8.4 MG/DL (ref 8.7–10.5)
CALCIUM SERPL-MCNC: 9.3 MG/DL (ref 8.7–10.5)
CHLORIDE SERPL-SCNC: 103 MMOL/L (ref 95–110)
CHLORIDE SERPL-SCNC: 104 MMOL/L (ref 95–110)
CO2 SERPL-SCNC: 14 MMOL/L (ref 23–29)
CO2 SERPL-SCNC: 20 MMOL/L (ref 23–29)
CODING SYSTEM: NORMAL
CODING SYSTEM: NORMAL
CREAT SERPL-MCNC: 6.4 MG/DL (ref 0.5–1.4)
CREAT SERPL-MCNC: 7.4 MG/DL (ref 0.5–1.4)
CROSSMATCH INTERPRETATION: NORMAL
CROSSMATCH INTERPRETATION: NORMAL
DIFFERENTIAL METHOD: ABNORMAL
DIFFERENTIAL METHOD: ABNORMAL
DISPENSE STATUS: NORMAL
DISPENSE STATUS: NORMAL
EOSINOPHIL # BLD AUTO: 0 K/UL (ref 0–0.5)
EOSINOPHIL # BLD AUTO: 0.1 K/UL (ref 0–0.5)
EOSINOPHIL NFR BLD: 0.1 % (ref 0–8)
EOSINOPHIL NFR BLD: 0.7 % (ref 0–8)
ERYTHROCYTE [DISTWIDTH] IN BLOOD BY AUTOMATED COUNT: 20.7 % (ref 11.5–14.5)
ERYTHROCYTE [DISTWIDTH] IN BLOOD BY AUTOMATED COUNT: 21.9 % (ref 11.5–14.5)
EST. GFR  (NO RACE VARIABLE): 7 ML/MIN/1.73 M^2
EST. GFR  (NO RACE VARIABLE): 8 ML/MIN/1.73 M^2
GLUCOSE SERPL-MCNC: 112 MG/DL (ref 70–110)
GLUCOSE SERPL-MCNC: 157 MG/DL (ref 70–110)
GLUCOSE SERPL-MCNC: 161 MG/DL (ref 70–110)
GLUCOSE SERPL-MCNC: 194 MG/DL (ref 70–110)
HCT VFR BLD AUTO: 19.9 % (ref 37–48.5)
HCT VFR BLD AUTO: 23.6 % (ref 37–48.5)
HGB BLD-MCNC: 6.7 G/DL (ref 12–16)
HGB BLD-MCNC: 7.9 G/DL (ref 12–16)
IMM GRANULOCYTES # BLD AUTO: 0.07 K/UL (ref 0–0.04)
IMM GRANULOCYTES # BLD AUTO: 0.09 K/UL (ref 0–0.04)
IMM GRANULOCYTES NFR BLD AUTO: 0.5 % (ref 0–0.5)
IMM GRANULOCYTES NFR BLD AUTO: 0.7 % (ref 0–0.5)
INR PPP: 1.3 (ref 0.8–1.2)
LYMPHOCYTES # BLD AUTO: 1.1 K/UL (ref 1–4.8)
LYMPHOCYTES # BLD AUTO: 1.1 K/UL (ref 1–4.8)
LYMPHOCYTES NFR BLD: 7.9 % (ref 18–48)
LYMPHOCYTES NFR BLD: 8.4 % (ref 18–48)
MAGNESIUM SERPL-MCNC: 2.2 MG/DL (ref 1.6–2.6)
MCH RBC QN AUTO: 28.7 PG (ref 27–31)
MCH RBC QN AUTO: 29 PG (ref 27–31)
MCHC RBC AUTO-ENTMCNC: 33.5 G/DL (ref 32–36)
MCHC RBC AUTO-ENTMCNC: 33.7 G/DL (ref 32–36)
MCV RBC AUTO: 86 FL (ref 82–98)
MCV RBC AUTO: 86 FL (ref 82–98)
MONOCYTES # BLD AUTO: 0.7 K/UL (ref 0.3–1)
MONOCYTES # BLD AUTO: 1 K/UL (ref 0.3–1)
MONOCYTES NFR BLD: 5.2 % (ref 4–15)
MONOCYTES NFR BLD: 7.4 % (ref 4–15)
NEUTROPHILS # BLD AUTO: 10.7 K/UL (ref 1.8–7.7)
NEUTROPHILS # BLD AUTO: 11.7 K/UL (ref 1.8–7.7)
NEUTROPHILS NFR BLD: 83.4 % (ref 38–73)
NEUTROPHILS NFR BLD: 85.1 % (ref 38–73)
NRBC BLD-RTO: 1 /100 WBC
NRBC BLD-RTO: 6 /100 WBC
NUM UNITS TRANS PACKED RBC: NORMAL
NUM UNITS TRANS PACKED RBC: NORMAL
PHOSPHATE SERPL-MCNC: 5.5 MG/DL (ref 2.7–4.5)
PLATELET # BLD AUTO: 89 K/UL (ref 150–450)
PLATELET # BLD AUTO: 95 K/UL (ref 150–450)
PMV BLD AUTO: 9.3 FL (ref 9.2–12.9)
PMV BLD AUTO: 9.3 FL (ref 9.2–12.9)
POCT GLUCOSE: 165 MG/DL (ref 70–110)
POCT GLUCOSE: 167 MG/DL (ref 70–110)
POCT GLUCOSE: 176 MG/DL (ref 70–110)
POCT GLUCOSE: 183 MG/DL (ref 70–110)
POCT GLUCOSE: 194 MG/DL (ref 70–110)
POCT GLUCOSE: 34 MG/DL (ref 70–110)
POTASSIUM SERPL-SCNC: 4.4 MMOL/L (ref 3.5–5.1)
POTASSIUM SERPL-SCNC: 5.4 MMOL/L (ref 3.5–5.1)
PROT SERPL-MCNC: 6.5 G/DL (ref 6–8.4)
PROT SERPL-MCNC: 7.8 G/DL (ref 6–8.4)
PROTHROMBIN TIME: 14.3 SEC (ref 9–12.5)
RBC # BLD AUTO: 2.31 M/UL (ref 4–5.4)
RBC # BLD AUTO: 2.75 M/UL (ref 4–5.4)
SODIUM SERPL-SCNC: 142 MMOL/L (ref 136–145)
SODIUM SERPL-SCNC: 144 MMOL/L (ref 136–145)
SPECIMEN OUTDATE: NORMAL
WBC # BLD AUTO: 12.77 K/UL (ref 3.9–12.7)
WBC # BLD AUTO: 13.78 K/UL (ref 3.9–12.7)

## 2023-12-13 PROCEDURE — 84100 ASSAY OF PHOSPHORUS: CPT | Performed by: HOSPITALIST

## 2023-12-13 PROCEDURE — 99223 1ST HOSP IP/OBS HIGH 75: CPT | Mod: ,,, | Performed by: INTERNAL MEDICINE

## 2023-12-13 PROCEDURE — 36415 COLL VENOUS BLD VENIPUNCTURE: CPT | Performed by: INTERNAL MEDICINE

## 2023-12-13 PROCEDURE — 96376 TX/PRO/DX INJ SAME DRUG ADON: CPT

## 2023-12-13 PROCEDURE — 86920 COMPATIBILITY TEST SPIN: CPT | Performed by: STUDENT IN AN ORGANIZED HEALTH CARE EDUCATION/TRAINING PROGRAM

## 2023-12-13 PROCEDURE — 96375 TX/PRO/DX INJ NEW DRUG ADDON: CPT

## 2023-12-13 PROCEDURE — 25000003 PHARM REV CODE 250: Performed by: STUDENT IN AN ORGANIZED HEALTH CARE EDUCATION/TRAINING PROGRAM

## 2023-12-13 PROCEDURE — 36415 COLL VENOUS BLD VENIPUNCTURE: CPT | Performed by: STUDENT IN AN ORGANIZED HEALTH CARE EDUCATION/TRAINING PROGRAM

## 2023-12-13 PROCEDURE — 27000221 HC OXYGEN, UP TO 24 HOURS

## 2023-12-13 PROCEDURE — 25000003 PHARM REV CODE 250: Performed by: HOSPITALIST

## 2023-12-13 PROCEDURE — 36415 COLL VENOUS BLD VENIPUNCTURE: CPT | Performed by: HOSPITALIST

## 2023-12-13 PROCEDURE — 25000003 PHARM REV CODE 250: Performed by: INTERNAL MEDICINE

## 2023-12-13 PROCEDURE — 99223 PR INITIAL HOSPITAL CARE,LEVL III: ICD-10-PCS | Mod: ,,, | Performed by: INTERNAL MEDICINE

## 2023-12-13 PROCEDURE — 96365 THER/PROPH/DIAG IV INF INIT: CPT | Mod: 59

## 2023-12-13 PROCEDURE — 94761 N-INVAS EAR/PLS OXIMETRY MLT: CPT

## 2023-12-13 PROCEDURE — C9113 INJ PANTOPRAZOLE SODIUM, VIA: HCPCS | Performed by: HOSPITALIST

## 2023-12-13 PROCEDURE — 96361 HYDRATE IV INFUSION ADD-ON: CPT

## 2023-12-13 PROCEDURE — 80053 COMPREHEN METABOLIC PANEL: CPT | Performed by: HOSPITALIST

## 2023-12-13 PROCEDURE — 20000000 HC ICU ROOM

## 2023-12-13 PROCEDURE — 99900035 HC TECH TIME PER 15 MIN (STAT)

## 2023-12-13 PROCEDURE — 63600175 PHARM REV CODE 636 W HCPCS: Performed by: HOSPITALIST

## 2023-12-13 PROCEDURE — 83735 ASSAY OF MAGNESIUM: CPT | Performed by: HOSPITALIST

## 2023-12-13 PROCEDURE — 80053 COMPREHEN METABOLIC PANEL: CPT | Mod: 91 | Performed by: HOSPITALIST

## 2023-12-13 PROCEDURE — 96372 THER/PROPH/DIAG INJ SC/IM: CPT | Performed by: HOSPITALIST

## 2023-12-13 PROCEDURE — P9016 RBC LEUKOCYTES REDUCED: HCPCS | Performed by: STUDENT IN AN ORGANIZED HEALTH CARE EDUCATION/TRAINING PROGRAM

## 2023-12-13 PROCEDURE — 86901 BLOOD TYPING SEROLOGIC RH(D): CPT | Performed by: STUDENT IN AN ORGANIZED HEALTH CARE EDUCATION/TRAINING PROGRAM

## 2023-12-13 PROCEDURE — 85610 PROTHROMBIN TIME: CPT | Performed by: INTERNAL MEDICINE

## 2023-12-13 PROCEDURE — 85025 COMPLETE CBC W/AUTO DIFF WBC: CPT | Mod: 91 | Performed by: HOSPITALIST

## 2023-12-13 PROCEDURE — 82962 GLUCOSE BLOOD TEST: CPT

## 2023-12-13 RX ORDER — PANTOPRAZOLE SODIUM 40 MG/10ML
40 INJECTION, POWDER, LYOPHILIZED, FOR SOLUTION INTRAVENOUS
Status: DISCONTINUED | OUTPATIENT
Start: 2023-12-14 | End: 2023-12-14

## 2023-12-13 RX ORDER — SODIUM CHLORIDE 9 MG/ML
INJECTION, SOLUTION INTRAVENOUS CONTINUOUS
Status: DISCONTINUED | OUTPATIENT
Start: 2023-12-13 | End: 2023-12-13

## 2023-12-13 RX ORDER — HYDROCODONE BITARTRATE AND ACETAMINOPHEN 500; 5 MG/1; MG/1
TABLET ORAL
Status: DISCONTINUED | OUTPATIENT
Start: 2023-12-13 | End: 2023-12-14

## 2023-12-13 RX ORDER — MUPIROCIN 20 MG/G
OINTMENT TOPICAL 2 TIMES DAILY
Status: DISCONTINUED | OUTPATIENT
Start: 2023-12-13 | End: 2023-12-17 | Stop reason: HOSPADM

## 2023-12-13 RX ORDER — NOREPINEPHRINE BITARTRATE/D5W 4MG/250ML
0-3 PLASTIC BAG, INJECTION (ML) INTRAVENOUS CONTINUOUS
Status: DISCONTINUED | OUTPATIENT
Start: 2023-12-13 | End: 2023-12-14

## 2023-12-13 RX ORDER — MORPHINE SULFATE 4 MG/ML
1 INJECTION, SOLUTION INTRAMUSCULAR; INTRAVENOUS EVERY 6 HOURS PRN
Status: DISCONTINUED | OUTPATIENT
Start: 2023-12-13 | End: 2023-12-15

## 2023-12-13 RX ADMIN — ONDANSETRON 8 MG: 4 TABLET, ORALLY DISINTEGRATING ORAL at 08:12

## 2023-12-13 RX ADMIN — LEVETIRACETAM 500 MG: 500 TABLET, FILM COATED ORAL at 08:12

## 2023-12-13 RX ADMIN — INSULIN DETEMIR 8 UNITS: 100 INJECTION, SOLUTION SUBCUTANEOUS at 11:12

## 2023-12-13 RX ADMIN — SIMETHICONE 80 MG: 80 TABLET, CHEWABLE ORAL at 08:12

## 2023-12-13 RX ADMIN — AMLODIPINE BESYLATE 5 MG: 5 TABLET ORAL at 08:12

## 2023-12-13 RX ADMIN — FLUOXETINE 20 MG: 20 CAPSULE ORAL at 08:12

## 2023-12-13 RX ADMIN — PROCHLORPERAZINE EDISYLATE 5 MG: 5 INJECTION INTRAMUSCULAR; INTRAVENOUS at 03:12

## 2023-12-13 RX ADMIN — HYDRALAZINE HYDROCHLORIDE 100 MG: 25 TABLET, FILM COATED ORAL at 08:12

## 2023-12-13 RX ADMIN — CARVEDILOL 25 MG: 25 TABLET, FILM COATED ORAL at 08:12

## 2023-12-13 RX ADMIN — SODIUM CHLORIDE: 9 INJECTION, SOLUTION INTRAVENOUS at 03:12

## 2023-12-13 RX ADMIN — MUPIROCIN 1 G: 20 OINTMENT TOPICAL at 09:12

## 2023-12-13 RX ADMIN — ISOSORBIDE MONONITRATE 30 MG: 30 TABLET, EXTENDED RELEASE ORAL at 08:12

## 2023-12-13 RX ADMIN — PANTOPRAZOLE SODIUM 40 MG: 40 INJECTION, POWDER, FOR SOLUTION INTRAVENOUS at 08:12

## 2023-12-13 RX ADMIN — MORPHINE SULFATE 4 MG: 4 INJECTION, SOLUTION INTRAMUSCULAR; INTRAVENOUS at 01:12

## 2023-12-13 RX ADMIN — GABAPENTIN 600 MG: 300 CAPSULE ORAL at 08:12

## 2023-12-13 RX ADMIN — MORPHINE SULFATE 1 MG: 4 INJECTION, SOLUTION INTRAMUSCULAR; INTRAVENOUS at 08:12

## 2023-12-13 RX ADMIN — INSULIN ASPART 8 UNITS: 100 INJECTION, SOLUTION INTRAVENOUS; SUBCUTANEOUS at 11:12

## 2023-12-13 RX ADMIN — MORPHINE SULFATE 4 MG: 4 INJECTION, SOLUTION INTRAMUSCULAR; INTRAVENOUS at 07:12

## 2023-12-13 RX ADMIN — SODIUM BICARBONATE: 84 INJECTION, SOLUTION INTRAVENOUS at 09:12

## 2023-12-13 RX ADMIN — ONDANSETRON 8 MG: 4 TABLET, ORALLY DISINTEGRATING ORAL at 03:12

## 2023-12-13 RX ADMIN — MEROPENEM 500 MG: 500 INJECTION, POWDER, FOR SOLUTION INTRAVENOUS at 04:12

## 2023-12-13 NOTE — ASSESSMENT & PLAN NOTE
Patient's FSGs are controlled on current medication regimen.  Last A1c reviewed-   Lab Results   Component Value Date    HGBA1C 5.8 08/01/2023     Most recent fingerstick glucose reviewed-   Recent Labs   Lab 12/12/23 2131   POCTGLUCOSE 132*       Current correctional scale  Medium  Maintain anti-hyperglycemic dose as follows-   Antihyperglycemics (From admission, onward)      Start     Stop Route Frequency Ordered    12/13/23 0645  insulin aspart U-100 pen 8 Units         -- SubQ 3 times daily before meals 12/12/23 1728    12/12/23 2100  insulin detemir U-100 (Levemir) pen 21 Units         -- SubQ Nightly 12/12/23 1728    12/12/23 1728  insulin aspart U-100 pen 1-10 Units         -- SubQ Before meals & nightly PRN 12/12/23 1728          Hold Oral hypoglycemics while patient is in the hospital.

## 2023-12-13 NOTE — PLAN OF CARE
Pt received dialysis at beginning of shift. BP elevated throughout shift, NAD noted at this time. Discussed PoC with pt, pt unwilling to verbalize understanding, only moans and groans until you repeat yourself several times. Heart monitor continuously removed 2/2 pt writhing around in bed. C/o pain and nausea handled w/PRN meds as ordered, will continue to monitor.

## 2023-12-13 NOTE — ASSESSMENT & PLAN NOTE
"Patient is identified as having Diastolic (HFpEF) heart failure that is Chronic. CHF is currently controlled. Latest ECHO performed and demonstrates- Results for orders placed during the hospital encounter of 09/10/23    Echo    Interpretation Summary    Left Ventricle: The left ventricle is normal in size. Mildly increased ventricular mass. Mildly increased wall thickness. Normal wall motion. There is low normal systolic function with a visually estimated ejection fraction of 50 - 55%. There is normal diastolic function.    Right Ventricle: Normal right ventricular cavity size. Wall thickness is normal. Right ventricle wall motion  is normal. Systolic function is normal.    IVC/SVC: Normal venous pressure at 3 mmHg.    Pericardium: There is a small circumferential effusion. Pericardial effusion is echolucent. No indication of cardiac tamponade. Evidence includes no chamber collapse.  . Continue Beta Blocker and ACE/ARB and monitor clinical status closely. Monitor on telemetry. Patient is off CHF pathway.  Monitor strict Is&Os and daily weights.  Place on fluid restriction of 1.5 L. Cardiology has not been consulted. Continue to stress to patient importance of self efficacy and  on diet for CHF. Last BNP reviewed- and noted below No results for input(s): "BNP", "BNPTRIAGEBLO" in the last 168 hours.  "

## 2023-12-13 NOTE — ASSESSMENT & PLAN NOTE
This patient has hyperkalemia which resolved with HD.. We will monitor for arrhythmias with EKG or continuous telemetry. We will treat the hyperkalemia with HD. The likely etiology of the hyperkalemia is ESRD.  The patients latest potassium has been reviewed and the results are listed below  Recent Labs   Lab 12/13/23  0427   K 4.4

## 2023-12-13 NOTE — ASSESSMENT & PLAN NOTE
Noted. Patient with suspected gastroparesis  Given uncontrolled nausea and vomiting will consult with GI  Pain control with IV narcotic for now

## 2023-12-13 NOTE — NURSING
SN rounded to pts room. Pt was supine and sleeping. Pt awoken and asked how she was feeling. Pt opened eyes and said ok. Call light in reach. Tele [phone] in use.

## 2023-12-13 NOTE — NURSING
1620- order received to transfer pt to [facility] ICU for closer monitoring following recent events and pt's clinical picture.   1630-house supervisor updated. Patient's belongings gathered for transfer to step down while waiting for bed assignment at [facility].  1640- pt transferred by bed to room 221 while waiting for bed assignment at [facility] ICU. Pt wakens briefly, closing eyes again when not stimulated.

## 2023-12-13 NOTE — PROGRESS NOTES
INPATIENT NEPHROLOGY Progress Note  [facility]    Patient Name: Tabby Howard  Date: 12/13/2023    Reason for consultation: ESRD    Chief Complaint:   Chief Complaint   Patient presents with    Abdominal Pain     Beginning this AM with abdominal distention, dialysis treatment on Saturday       History of Present Illness:  Patient is a 33-year-old female with a past medical history of end-stage renal disease on hemodialysis TTS, chronic gastroparesis SVT hyperkalemia hypertensive emergency type 2 diabetes with diabetic gastroparesis pericardial effusion who presents the emergency room for evaluation of nausea and vomiting that started earlier this morning.  It is in her midepigastric region.  She has nausea and vomiting.  It feels similar to her gastroparesis in the past.  She denies any fevers chills cough cold congestion runny nose sore throat.  She does not make urine.  She does not feel like a tearing or ripping sensation in her back.  She has nausea medicine home but no pain medicines.  None of her home medications are working. We are consulted for dialysis.     Interval History:  12/12- hypertensive, on RA, Hb 6s- will transfuse 2u of blood with HD today  12/13- had HD last night- got off 3L and given 2u of blood- BP high but just got all of her meds- still with abd pain- LFTs up- will do an IUF session today for fluid removal    Plan of Care:    Assessment:  ESRD on HD TTS  HTN urgency  Hyperkalemia/Acidosis  SHPT  Anemia of CKD     Plan:     - HD TTS- supplemental IUF today for fluid removal  - resume home BP meds  - UF 3-4L  - renal diet, 1.5L fluid restriction  - resume binder with meals  - transfused 2u of blood with HD 12/12- on mircera as outpt- ordered retacrit with HD while inpatient    Thank you for allowing us to participate in this patient's care. We will continue to follow.    Vital Signs:  Temp Readings from Last 3 Encounters:   12/13/23 96.3 °F (35.7 °C) (Oral)   10/27/23  98.9 °F (37.2 °C) (Oral)   10/18/23 97.6 °F (36.4 °C)       Pulse Readings from Last 3 Encounters:   12/13/23 88   10/27/23 88   10/18/23 80       BP Readings from Last 3 Encounters:   12/13/23 (!) 196/99   10/27/23 137/80   10/18/23 (!) 140/91       Weight:  Wt Readings from Last 3 Encounters:   12/12/23 61.2 kg (135 lb)   10/18/23 61.8 kg (136 lb 3.9 oz)   10/13/23 59.4 kg (130 lb 15.3 oz)       Medications:  Scheduled Meds:   amLODIPine  5 mg Oral Daily    carvediloL  25 mg Oral BID    FLUoxetine  20 mg Oral Daily    gabapentin  600 mg Oral BID    hydrALAZINE  10 mg Intravenous Once    hydrALAZINE  100 mg Oral BID    insulin aspart U-100  8 Units Subcutaneous TID AC    insulin detemir U-100 (Levemir)  21 Units Subcutaneous QHS    isosorbide mononitrate  30 mg Oral Daily    levETIRAcetam  500 mg Oral BID    pantoprazole  40 mg Intravenous Daily    polyethylene glycol  17 g Oral Daily     Continuous Infusions:  PRN Meds:.0.9%  NaCl infusion (for blood administration), acetaminophen, albuterol-ipratropium, aluminum-magnesium hydroxide-simethicone, dextrose 50%, dextrose 50%, glucagon (human recombinant), glucose, glucose, insulin aspart U-100, melatonin, morphine, morphine, naloxone, ondansetron, prochlorperazine, simethicone, sodium chloride 0.9%  No current facility-administered medications on file prior to encounter.     Current Outpatient Medications on File Prior to Encounter   Medication Sig Dispense Refill    amLODIPine (NORVASC) 5 MG tablet Take 1 tablet (5 mg total) by mouth once daily. (Patient taking differently: Take 5 mg by mouth once daily.) 30 tablet 1    apixaban (ELIQUIS) 5 mg Tab Take 1 tablet (5 mg total) by mouth 2 (two) times daily. 180 tablet 1    carvediloL (COREG) 25 MG tablet Take 1 tablet (25 mg total) by mouth 2 (two) times daily. 60 tablet 5    cetirizine (ZYRTEC) 10 MG tablet Take 1 tablet (10 mg total) by mouth once daily. 30 tablet 0    doxycycline (VIBRAMYCIN) 100 MG Cap Take 1  capsule twice a day by oral route for 7 days, for infectoin. (Patient taking differently: Take 100 mg by mouth every 12 (twelve) hours.) 14 capsule 0    FLUoxetine 20 MG capsule Take 1 capsule (20 mg total) by mouth once daily. 30 capsule 5    fluticasone propionate (FLONASE ALLERGY RELIEF) 50 mcg/actuation nasal spray [place] 1 spray every day by intranasal route. (Patient taking differently: 1 spray by Each Nostril route Daily.) 16 g 2    gabapentin (NEURONTIN) 300 MG capsule Take 2 capsules twice a day by oral route for 90 days. (Patient taking differently: Take 600 mg by mouth 2 (two) times daily.) 360 capsule 1    hydrALAZINE (APRESOLINE) 100 MG tablet Take 1 tablet (100 mg total) by mouth 2 (two) times daily. 180 tablet 1    insulin aspart U-100 (NOVOLOG FLEXPEN U-100 INSULIN) 100 unit/mL (3 mL) InPn pen Inject 8 units 3 times a day by subcutaneous route before meals (Patient taking differently: Inject 8 Units into the skin 3 (three) times daily before meals.) 24 mL 1    insulin detemir U-100, Levemir, (LEVEMIR FLEXTOUCH U100 INSULIN) 100 unit/mL (3 mL) InPn pen Inject 21 units every day by subcutaneous route in the evening for 90 days. (Patient taking differently: Inject 21 Units into the skin every evening.) 15 mL 1    isosorbide mononitrate (IMDUR) 30 MG 24 hr tablet Take 1 tablet (30 mg total) by mouth once daily. 90 tablet 1    levETIRAcetam (KEPPRA) 500 MG Tab Take 1 tablet twice a day by oral route for 30 days. (Patient taking differently: Take 500 mg by mouth 2 (two) times daily.) 60 tablet 2    ondansetron (ZOFRAN-ODT) 4 MG TbDL Place 1 tablet (4 mg total) under the tongue every 8 (eight) hours. 24 tablet 2    oxyCODONE-acetaminophen (PERCOCET) 5-325 mg per tablet TAKE 1 TABLET EVERY 6 HOURS AS NEEDED FOP PAIN (Patient taking differently: Take 1 tablet by mouth every 6 (six) hours as needed for Pain.) 15 tablet 0    pantoprazole (PROTONIX) 40 MG tablet Take 1 tablet (40 mg total) by mouth once daily. 90  tablet 1    promethazine (PHENERGAN) 25 MG tablet Take 1 tablet (25 mg total) by mouth every 6 (six) hours as needed for 5 days. (Patient taking differently: Take 25 mg by mouth every 6 (six) hours as needed for Nausea.) 20 tablet 2    sevelamer carbonate (RENVELA) 800 mg Tab Take 2 tablets with meals three times a day (Patient taking differently: Take 1,600 mg by mouth 3 (three) times daily with meals.) 180 tablet 11    sucralfate (CARAFATE) 100 mg/mL suspension Take 10 mL 4 times a day by oral route before meals for 30 days. (Patient taking differently: Take 1 g by mouth 4 (four) times daily with meals and nightly.) 1200 mL 1    amLODIPine (NORVASC) 5 MG tablet Take 1 tablet every day by oral route for 90 days. 90 tablet 1    blood sugar diagnostic (TRUE METRIX GLUCOSE TEST STRIP) Strp Use 1 strip to check blood glucose three times daily 100 each 11    blood-glucose meter (TRUE METRIX GLUCOSE METER) Misc Use to check blood glucose 1 each 0    brimonidine 0.2% (ALPHAGAN) 0.2 % Drop Place 1 drop in right eye twice daily. 10 mL 5    carvediloL (COREG) 25 MG tablet Take 1 tablet (25 mg total) by mouth 2 (two) times daily. 60 tablet 2    ciprofloxacin HCl (CILOXAN) 0.3 % ophthalmic solution instill 1 drop into the right eye 4 times a day for 30 days 5 mL 0    FLUoxetine 20 MG capsule Take 1 capsule (20 mg total) by mouth once daily. 90 capsule 1    gabapentin (NEURONTIN) 300 MG capsule Take 2 capsules (600 mg total) by mouth 2 (two) times daily. 360 capsule 1    insulin aspart U-100 (NOVOLOG FLEXPEN U-100 INSULIN) 100 unit/mL (3 mL) InPn pen Inject 10 units 3 times a day by subcutaneous route before meals for 90 days. 27 mL 2    insulin detemir U-100, Levemir, (LEVEMIR FLEXTOUCH U100 INSULIN) 100 unit/mL (3 mL) InPn pen Inject 18 units every day by subcutaneous route in the evening 18 mL 1    insulin detemir U-100, Levemir, (LEVEMIR FLEXTOUCH U100 INSULIN) 100 unit/mL (3 mL) InPn pen Inject 22 units every day by  "subcutaneous route in the evening for 90 days. 18 mL 1    isosorbide mononitrate (IMDUR) 30 MG 24 hr tablet Take 1 tablet (30 mg total) by mouth once daily. 30 tablet 1    ketorolac 0.5% (ACULAR) 0.5 % Drop Instill 1 drop into the right eye 4 times a day for 30 days 3 mL 5    lancets (TRUEPLUS LANCETS) 33 gauge Misc Use 1 to check blood glucose three times daily 100 each 11    lancets 33 gauge Misc Use to test twice daily 100 each 5    levETIRAcetam (KEPPRA) 500 MG Tab Take 1 tablet (500 mg total) by mouth 2 (two) times daily. 60 tablet 2    ondansetron (ZOFRAN-ODT) 4 MG TbDL Dissolve 1 tablet (4 mg total) by mouth every 8 (eight) hours. 24 tablet 2    pen needle, diabetic 31 gauge x 3/16" Ndle Use as directed to inject insulin 5 times daily 100 each 11    prednisoLONE acetate (PRED FORTE) 1 % DrpS Instill 1 drops into the right eye 4 times a day for 30 days 5 mL 1    promethazine (PHENERGAN) 25 MG suppository Place 1 suppository (25 mg total) rectally every 6 (six) hours as needed for Nausea. 10 suppository 0    sucroferric oxyhydroxide (VELPHORO) 500 mg Chew Take 1 tablet 3 times a day (Patient taking differently: Take 1 tablet by mouth 3 (three) times daily.) 90 tablet 6    timolol maleate 0.5% (TIMOPTIC) 0.5 % Drop Instill 1 drop into the right eye 2 times a day for 30 days 5 mL 6    [DISCONTINUED] atenoloL (TENORMIN) 50 MG tablet Take 1 tablet (50 mg total) by mouth every other day. 45 tablet 3    [DISCONTINUED] omeprazole (PRILOSEC) 20 MG capsule Take 2 capsules (40 mg total) by mouth once daily. for 10 days 20 capsule 0       Review of Systems:  Neg    Physical Exam:  General Appearance:    NAD, AAO x 3, cooperative, appears stated age   Head:    Normocephalic, atraumatic   Eyes:    PER, EOMI, and conjunctiva/sclera clear bilaterally       Mouth:   Moist mucus membranes, no thrush or oral lesions,       normal dentition   Back:     No CVA tenderness   Lungs:     Clear to auscultation bilaterally, no wheezes, " crackles,           rales or rhonchi, symmetric air movement, respirations unlabored   Chest wall:    No tenderness or deformity   Heart:    Regular rate and rhythm, S1 and S2 normal, no murmur, rub   or gallop   Abdomen:     Soft, non-tender, non-distended, bowel sounds active all four   quadrants, no RT or guarding, no masses, no organomegaly   Extremities:   Warm and well perfused, distal pulses are intact, no             cyanosis or peripheral edema   MSK:   No joint or muscle swelling, tenderness or deformity   Skin:   Skin color, texture, turgor normal, no rashes or lesions   Neurologic/Psychiatric:   CNII-XII intact, normal strength and sensation       throughout, no asterixis; normal affect, memory, judgement     and insight      Results:  Lab Results   Component Value Date     12/13/2023    K 4.4 12/13/2023     12/13/2023    CO2 20 (L) 12/13/2023    BUN 32 (H) 12/13/2023    CREATININE 6.4 (H) 12/13/2023    CALCIUM 9.3 12/13/2023    ANIONGAP 21 (H) 12/13/2023    ESTGFRAFRICA 19 (L) 09/03/2022    EGFRNONAA 18 (A) 07/31/2022       Lab Results   Component Value Date    CALCIUM 9.3 12/13/2023    PHOS 5.5 (H) 12/13/2023       Recent Labs   Lab 12/13/23  0427   WBC 13.78*   RBC 2.75*   HGB 7.9*   HCT 23.6*   PLT 89*   MCV 86   MCH 28.7   MCHC 33.5         I have personally reviewed pertinent radiological imaging and reports.    I have spent > 35 minutes providing care for this patient for the above diagnoses. These services have included chart/data/imaging review, evaluation, exam, formulation of plan, , note preparation, and discussions with staff involved in this patient's care.    Prateek Clark MD MPH  [facility] [address] [address]  [phone] (p)  [phone] (f)

## 2023-12-13 NOTE — CARE UPDATE
12/12/23 2010   Patient Assessment/Suction   Level of Consciousness (AVPU) responds to voice   Respiratory Effort Mild;Labored   PRE-TX-O2   Device (Oxygen Therapy) (S)  room air;nasal cannula   Flow (L/min) (S)  3   SpO2 (!) (S)  77 %  (pt was on RA 77%, placed on 3NC sats 95%)   Pulse 91   Resp (!) 22   Aerosol Therapy   $ Aerosol Therapy Charges PRN treatment not required

## 2023-12-13 NOTE — PROGRESS NOTES
[facility] Medicine  Progress Note    Patient Name: Tabby Howard  MRN: [id number]  Patient Class: OP- Observation   Admission Date: 12/12/2023  Length of Stay: 0 days  Attending Physician: Linda Cueto MD  Primary Care Provider: Melony Kim NP        Subjective:     Principal Problem:Metabolic acidosis        HPI:  Patient is a 33-year-old female with history of ESRD on HD, suspected gastroparesis, hypertension, pericardial effusion, type 2 diabetes who was seen today with complaint of nausea and vomiting since this morning.  Also with abdominal pain.  She reports she has had similar episodes in the past.  She denies any sick contacts.  No fevers cough congestion or runny nose.  Does report some chills.  She does not make urine.  She reports last bowel movement was yesterday.  Workup in the ED significant for metabolic acidosis with anion gap of 19, potassium 5.3.  Hemoglobin is 6.2 and glucose is 275.  Beta hydroxybutyrate is normal.  I discussed management with nephrologist Dr. Peguero who recommends dialysis today with 2 units of blood.  Orders placed.      Overview/Hospital Course:  No notes on file    Interval History:  Patient seen and examined.  Reports abdominal pain is still uncontrolled.  Still with nausea and vomiting.  LFT bump noted.  We will change diet to clears.  Consult with GI for consideration of endoscopy.  Per Nephrology will do ultrafiltration session today.    Review of Systems   Constitutional:  Positive for chills. Negative for fever.   HENT:  Negative for congestion and rhinorrhea.    Respiratory:  Negative for cough, shortness of breath and wheezing.    Cardiovascular:  Negative for chest pain, palpitations and leg swelling.   Gastrointestinal:  Positive for abdominal pain, nausea and vomiting. Negative for abdominal distention.   Endocrine: Negative for cold intolerance and heat intolerance.   Genitourinary:  Negative for dysuria and urgency.    Musculoskeletal:  Negative for arthralgias and neck stiffness.   Skin:  Negative for rash and wound.   Neurological:  Negative for dizziness and weakness.     Objective:     Vital Signs (Most Recent):  Temp: 96.3 °F (35.7 °C) (12/13/23 0818)  Pulse: 88 (12/13/23 0818)  Resp: 18 (12/13/23 0818)  BP: (!) 196/99 (12/13/23 0818)  SpO2: 97 % (12/13/23 0818) Vital Signs (24h Range):  Temp:  [96.3 °F (35.7 °C)-98.8 °F (37.1 °C)] 96.3 °F (35.7 °C)  Pulse:  [83-92] 88  Resp:  [16-22] 18  SpO2:  [77 %-100 %] 97 %  BP: ()/() 196/99     Weight: 61.2 kg (135 lb)  Body mass index is 24.69 kg/m².    Intake/Output Summary (Last 24 hours) at 12/13/2023 1051  Last data filed at 12/13/2023 0613  Gross per 24 hour   Intake 1481 ml   Output 4200 ml   Net -2719 ml         Physical Exam  Constitutional:       Appearance: She is well-developed.      Comments: Appears uncomfortable   HENT:      Head: Normocephalic and atraumatic.   Eyes:      Pupils: Pupils are equal, round, and reactive to light.   Cardiovascular:      Rate and Rhythm: Normal rate and regular rhythm.      Heart sounds: No murmur heard.  Pulmonary:      Effort: Pulmonary effort is normal. No respiratory distress.      Breath sounds: Normal breath sounds. No wheezing or rales.   Abdominal:      General: Bowel sounds are normal. There is no distension.      Palpations: Abdomen is soft.      Tenderness: There is no abdominal tenderness.      Comments: Bile stained sheets   Musculoskeletal:         General: Normal range of motion.   Skin:     General: Skin is warm and dry.      Findings: No rash.   Neurological:      Mental Status: She is alert and oriented to person, place, and time.      Cranial Nerves: No cranial nerve deficit.   Psychiatric:         Behavior: Behavior normal.             Significant Labs: All pertinent labs within the past 24 hours have been reviewed.    Significant Imaging: I have reviewed all pertinent imaging results/findings within the  past 24 hours.    Assessment/Plan:      * Metabolic acidosis  Nephrology consulted for HD      Abdominal pain  Noted. Patient with suspected gastroparesis  Given uncontrolled nausea and vomiting will consult with GI  Pain control with IV narcotic for now      Hyperkalemia  This patient has hyperkalemia which resolved with HD.. We will monitor for arrhythmias with EKG or continuous telemetry. We will treat the hyperkalemia with HD. The likely etiology of the hyperkalemia is ESRD.  The patients latest potassium has been reviewed and the results are listed below  Recent Labs   Lab 12/13/23  0427   K 4.4               Anemia in ESRD (end-stage renal disease)  Patient's anemia is currently uncontrolled. Has not received any PRBCs to date. Etiology likely d/t chronic disease due to Chronic Kidney Disease/ESRD  Current CBC reviewed-   Lab Results   Component Value Date    HGB 7.9 (L) 12/13/2023    HCT 23.6 (L) 12/13/2023     Monitor serial CBC and transfuse if patient becomes hemodynamically unstable, symptomatic or H/H drops below 7/21.  Transfused 2 units 12/12    Chronic congestive heart failure with left ventricular diastolic dysfunction  Patient is identified as having Diastolic (HFpEF) heart failure that is Chronic. CHF is currently controlled. Latest ECHO performed and demonstrates- Results for orders placed during the hospital encounter of 09/10/23    Echo    Interpretation Summary    Left Ventricle: The left ventricle is normal in size. Mildly increased ventricular mass. Mildly increased wall thickness. Normal wall motion. There is low normal systolic function with a visually estimated ejection fraction of 50 - 55%. There is normal diastolic function.    Right Ventricle: Normal right ventricular cavity size. Wall thickness is normal. Right ventricle wall motion  is normal. Systolic function is normal.    IVC/SVC: Normal venous pressure at 3 mmHg.    Pericardium: There is a small circumferential effusion.  "Pericardial effusion is echolucent. No indication of cardiac tamponade. Evidence includes no chamber collapse.  . Continue Beta Blocker and ACE/ARB and monitor clinical status closely. Monitor on telemetry. Patient is off CHF pathway.  Monitor strict Is&Os and daily weights.  Place on fluid restriction of 1.5 L. Cardiology has not been consulted. Continue to stress to patient importance of self efficacy and  on diet for CHF. Last BNP reviewed- and noted below No results for input(s): "BNP", "BNPTRIAGEBLO" in the last 168 hours.    Type 2 diabetes mellitus with hyperglycemia, with long-term current use of insulin, previous HbA1c 5.8%  Patient's FSGs are controlled on current medication regimen.  Last A1c reviewed-   Lab Results   Component Value Date    HGBA1C 5.8 08/01/2023     Most recent fingerstick glucose reviewed-   Recent Labs   Lab 12/12/23 2131   POCTGLUCOSE 132*       Current correctional scale  Medium  Maintain anti-hyperglycemic dose as follows-   Antihyperglycemics (From admission, onward)      Start     Stop Route Frequency Ordered    12/13/23 0645  insulin aspart U-100 pen 8 Units         -- SubQ 3 times daily before meals 12/12/23 1728    12/12/23 2100  insulin detemir U-100 (Levemir) pen 21 Units         -- SubQ Nightly 12/12/23 1728    12/12/23 1728  insulin aspart U-100 pen 1-10 Units         -- SubQ Before meals & nightly PRN 12/12/23 1728          Hold Oral hypoglycemics while patient is in the hospital.        ESRD (end stage renal disease)  Nephrology consulted for HD      VTE Risk Mitigation (From admission, onward)           Ordered     IP VTE HIGH RISK PATIENT  Once         12/12/23 1728     Place sequential compression device  Until discontinued         12/12/23 1728     Reason for No Pharmacological VTE Prophylaxis  Once        Question:  Reasons:  Answer:  Already adequately anticoagulated on oral Anticoagulants    12/12/23 1728                    Discharge Planning   TATIANA:      " Code Status: Full Code   Is the patient medically ready for discharge?:     Reason for patient still in hospital (select all that apply): Patient trending condition, Treatment, and Consult recommendations  Discharge Plan A: Home with family                  Linda Cueto MD  Department of Hospital Medicine   [facility]/Surg

## 2023-12-13 NOTE — ASSESSMENT & PLAN NOTE
Patient's anemia is currently uncontrolled. Has not received any PRBCs to date. Etiology likely d/t chronic disease due to Chronic Kidney Disease/ESRD  Current CBC reviewed-   Lab Results   Component Value Date    HGB 7.9 (L) 12/13/2023    HCT 23.6 (L) 12/13/2023     Monitor serial CBC and transfuse if patient becomes hemodynamically unstable, symptomatic or H/H drops below 7/21.  Transfused 2 units 12/12

## 2023-12-13 NOTE — PLAN OF CARE
Problem: Adult Inpatient Plan of Care  Goal: Plan of Care Review  Outcome: Ongoing, Progressing   Lying on left side with HOB up 35. POC and medications reviewed with patient. Verbalized understanding. Saline loc in right lower fa with DDI. No redness or swelling at site. Tele [id number] in use.  Call light in reach. Will continue to monitor. Sr up x 2. Bed in lowest position with brakes locked.   Problem: Adult Inpatient Plan of Care  Goal: Optimal Comfort and Wellbeing  Outcome: Ongoing, Progressing   Q 2 hourly rounds made through out shift and IV site, pain and position monitored . PRN meds given per MD order.   Problem: Diabetes Comorbidity  Goal: Blood Glucose Level Within Targeted Range  Outcome: Ongoing, Progressing   CBGs monitored through out shift and in sulin given  given per MD order.

## 2023-12-13 NOTE — SUBJECTIVE & OBJECTIVE
Interval History:  Patient seen and examined.  Reports abdominal pain is still uncontrolled.  Still with nausea and vomiting.  LFT bump noted.  We will change diet to clears.  Consult with GI for consideration of endoscopy.  Per Nephrology will do ultrafiltration session today.    Review of Systems   Constitutional:  Positive for chills. Negative for fever.   HENT:  Negative for congestion and rhinorrhea.    Respiratory:  Negative for cough, shortness of breath and wheezing.    Cardiovascular:  Negative for chest pain, palpitations and leg swelling.   Gastrointestinal:  Positive for abdominal pain, nausea and vomiting. Negative for abdominal distention.   Endocrine: Negative for cold intolerance and heat intolerance.   Genitourinary:  Negative for dysuria and urgency.   Musculoskeletal:  Negative for arthralgias and neck stiffness.   Skin:  Negative for rash and wound.   Neurological:  Negative for dizziness and weakness.     Objective:     Vital Signs (Most Recent):  Temp: 96.3 °F (35.7 °C) (12/13/23 0818)  Pulse: 88 (12/13/23 0818)  Resp: 18 (12/13/23 0818)  BP: (!) 196/99 (12/13/23 0818)  SpO2: 97 % (12/13/23 0818) Vital Signs (24h Range):  Temp:  [96.3 °F (35.7 °C)-98.8 °F (37.1 °C)] 96.3 °F (35.7 °C)  Pulse:  [83-92] 88  Resp:  [16-22] 18  SpO2:  [77 %-100 %] 97 %  BP: ()/() 196/99     Weight: 61.2 kg (135 lb)  Body mass index is 24.69 kg/m².    Intake/Output Summary (Last 24 hours) at 12/13/2023 1051  Last data filed at 12/13/2023 0613  Gross per 24 hour   Intake 1481 ml   Output 4200 ml   Net -2719 ml         Physical Exam  Constitutional:       Appearance: She is well-developed.      Comments: Appears uncomfortable   HENT:      Head: Normocephalic and atraumatic.   Eyes:      Pupils: Pupils are equal, round, and reactive to light.   Cardiovascular:      Rate and Rhythm: Normal rate and regular rhythm.      Heart sounds: No murmur heard.  Pulmonary:      Effort: Pulmonary effort is normal. No  respiratory distress.      Breath sounds: Normal breath sounds. No wheezing or rales.   Abdominal:      General: Bowel sounds are normal. There is no distension.      Palpations: Abdomen is soft.      Tenderness: There is no abdominal tenderness.      Comments: Bile stained sheets   Musculoskeletal:         General: Normal range of motion.   Skin:     General: Skin is warm and dry.      Findings: No rash.   Neurological:      Mental Status: She is alert and oriented to person, place, and time.      Cranial Nerves: No cranial nerve deficit.   Psychiatric:         Behavior: Behavior normal.             Significant Labs: All pertinent labs within the past 24 hours have been reviewed.    Significant Imaging: I have reviewed all pertinent imaging results/findings within the past 24 hours.

## 2023-12-13 NOTE — NURSING
Nurses Note -- 4 Eyes      12/12/2023   6:18 PM      Skin assessed during: Admit      [x] No Altered Skin Integrity Present    []Prevention Measures Documented      [] Yes- Altered Skin Integrity Present or Discovered   [] LDA Added if Not in Epic (Describe Wound)   [] New Altered Skin Integrity was Present on Admit and Documented in LDA   [] Wound Image Taken    Wound Care Consulted? No    Attending Nurse:  Aziza LINTON    Second RN/Staff Member:   Yamilet WYNN

## 2023-12-13 NOTE — NURSING
Nurse to room. Pt snoring and hard to arouse. PCT in  room rounding and B/p 66/44. SN performed sternal rub. Pt opened eyes and said yes. Resource nurse called to room and RRT initiated.

## 2023-12-13 NOTE — PROGRESS NOTES
3000 ml net uf removed, pt received 2 units prbc's   12/12/23 2040   Required for all Hemodialysis Patients   Hepatitis Status negative   Handoff Report   Received From [facility]ahl[facility]   Treatment Type   Treatment Type Maintenance   Vital Signs   Temp 97.6 °F (36.4 °C)   Pulse 88   Resp 18   BP (!) 190/118   Assessments (Pre/Post)   Date Hepatitis Profile Obtained 09/19/23   Blood Liters Processed (BLP) 58.7   Transport Modality ambulatory   Level of Consciousness (AVPU) alert   Dialyzer Clearance mildly streaked   Pain   Preferred Pain Scale number (Numeric Rating Pain Scale)   Pain Rating (0-10): Rest 0        Hemodialysis Catheter 06/17/23 1135 right internal jugular   Placement Date/Time: 06/17/23 1135   Present Prior to Hospital Arrival?: No  Hand Hygiene: Performed  Barrier Precautions: Performed  Skin Antisepsis: ChloraPrep  Hemodialysis Catheter Type: Tunneled catheter  Location: right internal jugular  Cathete...   Line Necessity Review CRRT/HD   Site Assessment No redness;No swelling;No warmth   Line Securement Device Secured with sutures   Dressing Type CHG impregnated dressing/sponge;Central line dressing   Dressing Status Clean;Dry;Intact   Dressing Intervention Integrity maintained   Date on Dressing 12/12/23   Dressing Due to be Changed 12/18/23   Venous Patency/Care flushed w/o difficulty;normal saline locked;deaccessed   Arterial Patency/Care flushed w/o difficulty;normal saline locked;deaccessed   Post-Hemodialysis Assessment   Rinseback Volume (mL) 250 mL   Blood Volume Processed (Liters) 58.7 L   Dialyzer Clearance Lightly streaked   Duration of Treatment 180 minutes   Additional Fluid Intake (mL) 500 mL   Total UF (mL) 4200 mL   Net Fluid Removal 3000   Patient Response to Treatment tolerated well   Post-Treatment Weight 58.2 kg (128 lb 4.9 oz)   Treatment Weight Change -3   Post-Hemodialysis Comments tx completed     Educated on tx adherence

## 2023-12-13 NOTE — SIGNIFICANT EVENT
Responded to RRT.    Patient in Trendelenburg position in bed.  Blood pressure 60s over 30s.  Glucose 30.  Not responsive.    IV had blown.  Nurse replaced IV and gave glucagon.  Fluid bolus was given.  Blood pressure increased to 100s over 60s.  Patient became more responsive.  Followed commands.    Patient needs transfer to ICU for higher level of care.  Discussed with GI MD as well as Nephrology MD.  We will initiate transfer to [facility] ICU.

## 2023-12-13 NOTE — NURSING
1540-called to room, pt unresponsive, cold clammy touch. Snoring respirations- BP with sys 60s. Pt placed into trendelenberg. O2 NC placed at 4L/min. RRT called. See RRT flowsheet.  1545- IV bolus started to PIV right lateral forearm- swelling noted at site. Fluids stopped- new PIV site started to right upper forearm with 20g Jelco. Glucose checked- reading 34. D50 25gm IVP given via verbal order Dr Cueto who is at bedside. 2nd IV site started to left EJ site with 20g Jelco by WILL Cooper RN, HS.  1555-pt noted moving right arm spontaneously, opening eyes briefly to name. Skin remains cold, dry at this time.   1600-repeat glucose 194.. repositioned to supine position.  1610-Dr Cueto at bedside to reassess- repeated stimulation gets patient to briefly open her eyes.  1620-coughing strongly, sounding congested but no secretions noted. Repeat glucose 183.Pt opening eyes briefly- declining to have mouth suctioned. Closing eyes again, respirations less noisy.

## 2023-12-13 NOTE — CONSULTS
[facility]     CC: Abdominal Pain    HPI 33 y.o. female with multiple poorly controlled medical problems including non-compliance, CKD V on HD, DM, sickle cell trait, chronic multifactorial anemia and suspected gastroparesis who is s/p cholecystectomy and is admitted with acute on chronic, moderate to severe, upper abdominal pain. She was given pain medication prior to my exam thus history limited, she does have some tenderness to palpation of RUQ. She is found to have a new leukocytosis today with elevated LFTs and bilirubin concerning for cholangitis. History also obtained from chart review and nursing, who report she has not had vomiting today but did yesterday and that she frequently complains of abdominal pain.     Past Medical History:   Diagnosis Date    ESRD on hemodialysis 2022    Gastritis 2022    EGD was 21    Gastroparesis 2022    has not had Emptying study    Heart failure with preserved ejection fraction 2022    EF 55% on 3/22    History of supraventricular tachycardia     Hyperkalemia 2022    Hypertensive emergency 2022    Sickle cell trait 2022    Type 2 diabetes mellitus        Past Surgical History:   Procedure Laterality Date     SECTION      x 3    COLONOSCOPY      COLONOSCOPY N/A 2022    Procedure: COLONOSCOPY;  Surgeon: Jagdeep Cedeno MD;  Location: [facility];  Service: Endoscopy;  Laterality: N/A;    ESOPHAGOGASTRODUODENOSCOPY N/A 10/18/2019    Procedure: ESOPHAGOGASTRODUODENOSCOPY (EGD);  Surgeon: Gianluca Mendez MD;  Location: [facility];  Service: Endoscopy;  Laterality: N/A;    ESOPHAGOGASTRODUODENOSCOPY N/A 2022    Procedure: EGD (ESOPHAGOGASTRODUODENOSCOPY);  Surgeon: Micky Paredes III, MD;  Location: [facility];  Service: Endoscopy;  Laterality: N/A;    ESOPHAGOGASTRODUODENOSCOPY N/A 2022    Procedure: EGD (ESOPHAGOGASTRODUODENOSCOPY);  Surgeon: Marcelo Zhong MD;  Location: [facility];  Service:  "Endoscopy;  Laterality: N/A;    INSERTION, CATHETER, TUNNELED N/A 6/17/2023    Procedure: Insertion,catheter,tunneled;  Surgeon: Carlos Thurman Jr., MD;  Location: [facility] OR;  Service: General;  Laterality: N/A;    LAPAROSCOPIC CHOLECYSTECTOMY N/A 07/30/2022    Procedure: CHOLECYSTECTOMY, LAPAROSCOPIC;  Surgeon: Grey Perez MD;  Location: [facility] OR;  Service: General;  Laterality: N/A;    PLACEMENT OF DUAL-LUMEN VASCULAR CATHETER Left 07/12/2022    Procedure: INSERTION-CATHETER-JOSEPH;  Surgeon: Dionte Gan MD;  Location: [facility];  Service: General;  Laterality: Left;    PLACEMENT OF DUAL-LUMEN VASCULAR CATHETER Right 07/26/2022    Procedure: INSERTION-CATHETER-Hemosplit;  Surgeon: Dionte Gan MD;  Location: [facility] OR;  Service: General;  Laterality: Right;       Social History     Tobacco Use    Smoking status: Never    Smokeless tobacco: Never   Substance Use Topics    Alcohol use: Not Currently    Drug use: No       Family History   Problem Relation Age of Onset    Diabetes Mother     Diabetes Father        Allergies and Medications reviewed     Review of Systems  Unable to obtain complete ROS due to recent pain medication, + abdominal pain    Physical Examination  BP (!) 196/99 (BP Location: Right arm, Patient Position: Sitting)   Pulse 88   Temp 96.3 °F (35.7 °C) (Oral)   Resp 18   Ht 5' 2" (1.575 m)   Wt 61.2 kg (135 lb)   SpO2 97%   Breastfeeding No   BMI 24.69 kg/m²   General appearance: lethargic, chronically ill appearing  HENT: Normocephalic, atraumatic, neck symmetrical, no nasal discharge   Eyes: conjunctivae/corneas clear, PERRL, EOM's intact, sclera anicteric  Lungs: clear to auscultation bilaterally, no dullness to percussion bilaterally, symmetric expansion, breathing unlabored  Heart: regular rate and rhythm without rub; no displacement of the PMI   Abdomen: soft, BS active ,ttp RUQ without rebound or guarding, no masses appreciated   Extremities: extremities symmetric; no " clubbing, cyanosis, or edema  Integument: Skin color, texture, turgor normal; no rashes; hair distrubution normal, no jaundice        Labs:  Lab Results   Component Value Date    WBC 13.78 (H) 12/13/2023    HGB 7.9 (L) 12/13/2023    HCT 23.6 (L) 12/13/2023    MCV 86 12/13/2023    PLT 89 (L) 12/13/2023       CMP  Sodium   Date Value Ref Range Status   12/13/2023 144 136 - 145 mmol/L Final     Potassium   Date Value Ref Range Status   12/13/2023 4.4 3.5 - 5.1 mmol/L Final     Chloride   Date Value Ref Range Status   12/13/2023 103 95 - 110 mmol/L Final     CO2   Date Value Ref Range Status   12/13/2023 20 (L) 23 - 29 mmol/L Final     Glucose   Date Value Ref Range Status   12/13/2023 112 (H) 70 - 110 mg/dL Final     BUN   Date Value Ref Range Status   12/13/2023 32 (H) 6 - 20 mg/dL Final     Creatinine   Date Value Ref Range Status   12/13/2023 6.4 (H) 0.5 - 1.4 mg/dL Final     Calcium   Date Value Ref Range Status   12/13/2023 9.3 8.7 - 10.5 mg/dL Final     Total Protein   Date Value Ref Range Status   12/13/2023 7.8 6.0 - 8.4 g/dL Final     Albumin   Date Value Ref Range Status   12/13/2023 3.7 3.5 - 5.2 g/dL Final     Total Bilirubin   Date Value Ref Range Status   12/13/2023 3.3 (H) 0.1 - 1.0 mg/dL Final     Comment:     For infants and newborns, interpretation of results should be based  on gestational age, weight and in agreement with clinical  observations.    Premature Infant recommended reference ranges:  Up to 24 hours.............<8.0 mg/dL  Up to 48 hours............<12.0 mg/dL  3-5 days..................<15.0 mg/dL  6-29 days.................<15.0 mg/dL       Alkaline Phosphatase   Date Value Ref Range Status   12/13/2023 240 (H) 55 - 135 U/L Final     AST   Date Value Ref Range Status   12/13/2023 175 (H) 10 - 40 U/L Final     ALT   Date Value Ref Range Status   12/13/2023 174 (H) 10 - 44 U/L Final     Anion Gap   Date Value Ref Range Status   12/13/2023 21 (H) 8 - 16 mmol/L Final     eGFR   Date Value  Ref Range Status   12/13/2023 8 (A) >60 mL/min/1.73 m^2 Final         Imaging:  Abdominal ultrasound 10/13/23 was independently visualized and reviewed by me and showed 1. No evidence of acute intra-abdominal findings.  2. Echogenic kidney attributed towards long-standing medical renal disease.  3. Status post cholecystectomy.  CBD- 5mm    Assessment:   33 y.o. female with multiple poorly controlled medical problems including non-compliance, CKD V on HD, DM, sickle cell trait, chronic multifactorial anemia and suspected gastroparesis who is s/p cholecystectomy and is admitted with abdominal pain, nausea and vomiting, today with new leukocytosis, elevated LFTs and bilirubin concerning for cholangitis. She is hemodynamically stable though lethargic after receiving pain medications.    Plan:  -Begin antibiotics  -MRCP  -Will discuss case with advanced endoscopist as likely needs ERCP    -Re-evaluate patient later this afternoon  -Supportive care with pain medications (though would limit somewhat given lethargy on my exam) and anti-emetics as needed  -Ok for clears today, NPO at midnight  -Avoid anticoagulation  -Repeat CBC, CMP in AM     Renata Holt MD  [facility]  [address], [address]  [place] [address]  Office: [phone]  Fax: [phone]

## 2023-12-14 LAB
ALBUMIN SERPL BCP-MCNC: 3.8 G/DL (ref 3.5–5.2)
ALP SERPL-CCNC: 198 U/L (ref 55–135)
ALT SERPL W/O P-5'-P-CCNC: 104 U/L (ref 10–44)
AMMONIA PLAS-SCNC: 46 UMOL/L (ref 10–50)
ANION GAP SERPL CALC-SCNC: 14 MMOL/L (ref 8–16)
AST SERPL-CCNC: 103 U/L (ref 10–40)
BASOPHILS # BLD AUTO: 0.06 K/UL (ref 0–0.2)
BASOPHILS NFR BLD: 0.4 % (ref 0–1.9)
BILIRUB SERPL-MCNC: 1.4 MG/DL (ref 0.1–1)
BUN SERPL-MCNC: 62 MG/DL (ref 6–20)
CALCIUM SERPL-MCNC: 7.9 MG/DL (ref 8.7–10.5)
CHLORIDE SERPL-SCNC: 103 MMOL/L (ref 95–110)
CO2 SERPL-SCNC: 25 MMOL/L (ref 23–29)
CREAT SERPL-MCNC: 7.8 MG/DL (ref 0.5–1.4)
DIFFERENTIAL METHOD: ABNORMAL
EOSINOPHIL # BLD AUTO: 0.2 K/UL (ref 0–0.5)
EOSINOPHIL NFR BLD: 1.5 % (ref 0–8)
ERYTHROCYTE [DISTWIDTH] IN BLOOD BY AUTOMATED COUNT: 21.2 % (ref 11.5–14.5)
EST. GFR  (NO RACE VARIABLE): 6.4 ML/MIN/1.73 M^2
GLUCOSE SERPL-MCNC: 101 MG/DL (ref 70–110)
GLUCOSE SERPL-MCNC: 101 MG/DL (ref 70–110)
GLUCOSE SERPL-MCNC: 130 MG/DL (ref 70–110)
GLUCOSE SERPL-MCNC: 132 MG/DL (ref 70–110)
GLUCOSE SERPL-MCNC: 144 MG/DL (ref 70–110)
GLUCOSE SERPL-MCNC: 163 MG/DL (ref 70–110)
HCT VFR BLD AUTO: 22.9 % (ref 37–48.5)
HGB BLD-MCNC: 8 G/DL (ref 12–16)
IMM GRANULOCYTES # BLD AUTO: 0.1 K/UL (ref 0–0.04)
IMM GRANULOCYTES NFR BLD AUTO: 0.7 % (ref 0–0.5)
LYMPHOCYTES # BLD AUTO: 1.8 K/UL (ref 1–4.8)
LYMPHOCYTES NFR BLD: 12.9 % (ref 18–48)
MAGNESIUM SERPL-MCNC: 2.4 MG/DL (ref 1.6–2.6)
MCH RBC QN AUTO: 29.7 PG (ref 27–31)
MCHC RBC AUTO-ENTMCNC: 34.9 G/DL (ref 32–36)
MCV RBC AUTO: 85 FL (ref 82–98)
MONOCYTES # BLD AUTO: 1.2 K/UL (ref 0.3–1)
MONOCYTES NFR BLD: 8.4 % (ref 4–15)
NEUTROPHILS # BLD AUTO: 10.7 K/UL (ref 1.8–7.7)
NEUTROPHILS NFR BLD: 76.1 % (ref 38–73)
NRBC BLD-RTO: 10 /100 WBC
PHOSPHATE SERPL-MCNC: 7.9 MG/DL (ref 2.7–4.5)
PLATELET # BLD AUTO: 90 K/UL (ref 150–450)
PMV BLD AUTO: 9.7 FL (ref 9.2–12.9)
POTASSIUM SERPL-SCNC: 5.2 MMOL/L (ref 3.5–5.1)
PROT SERPL-MCNC: 6.8 G/DL (ref 6–8.4)
RBC # BLD AUTO: 2.69 M/UL (ref 4–5.4)
SODIUM SERPL-SCNC: 142 MMOL/L (ref 136–145)
WBC # BLD AUTO: 14.07 K/UL (ref 3.9–12.7)

## 2023-12-14 PROCEDURE — 99900031 HC PATIENT EDUCATION (STAT)

## 2023-12-14 PROCEDURE — 82140 ASSAY OF AMMONIA: CPT | Performed by: STUDENT IN AN ORGANIZED HEALTH CARE EDUCATION/TRAINING PROGRAM

## 2023-12-14 PROCEDURE — 84100 ASSAY OF PHOSPHORUS: CPT | Performed by: HOSPITALIST

## 2023-12-14 PROCEDURE — 27000221 HC OXYGEN, UP TO 24 HOURS

## 2023-12-14 PROCEDURE — 36430 TRANSFUSION BLD/BLD COMPNT: CPT

## 2023-12-14 PROCEDURE — 36415 COLL VENOUS BLD VENIPUNCTURE: CPT | Performed by: INTERNAL MEDICINE

## 2023-12-14 PROCEDURE — 83735 ASSAY OF MAGNESIUM: CPT | Performed by: HOSPITALIST

## 2023-12-14 PROCEDURE — 80053 COMPREHEN METABOLIC PANEL: CPT | Performed by: HOSPITALIST

## 2023-12-14 PROCEDURE — 25000003 PHARM REV CODE 250: Performed by: HOSPITALIST

## 2023-12-14 PROCEDURE — 90935 HEMODIALYSIS ONE EVALUATION: CPT

## 2023-12-14 PROCEDURE — 80074 ACUTE HEPATITIS PANEL: CPT | Performed by: INTERNAL MEDICINE

## 2023-12-14 PROCEDURE — 86015 ACTIN ANTIBODY EACH: CPT | Performed by: INTERNAL MEDICINE

## 2023-12-14 PROCEDURE — 63600175 PHARM REV CODE 636 W HCPCS: Performed by: HOSPITALIST

## 2023-12-14 PROCEDURE — 85025 COMPLETE CBC W/AUTO DIFF WBC: CPT | Performed by: HOSPITALIST

## 2023-12-14 PROCEDURE — 94761 N-INVAS EAR/PLS OXIMETRY MLT: CPT

## 2023-12-14 PROCEDURE — 94799 UNLISTED PULMONARY SVC/PX: CPT

## 2023-12-14 PROCEDURE — C9113 INJ PANTOPRAZOLE SODIUM, VIA: HCPCS | Performed by: STUDENT IN AN ORGANIZED HEALTH CARE EDUCATION/TRAINING PROGRAM

## 2023-12-14 PROCEDURE — 99900035 HC TECH TIME PER 15 MIN (STAT)

## 2023-12-14 PROCEDURE — 12000002 HC ACUTE/MED SURGE SEMI-PRIVATE ROOM

## 2023-12-14 PROCEDURE — 63600175 PHARM REV CODE 636 W HCPCS: Performed by: STUDENT IN AN ORGANIZED HEALTH CARE EDUCATION/TRAINING PROGRAM

## 2023-12-14 PROCEDURE — 25000003 PHARM REV CODE 250: Performed by: STUDENT IN AN ORGANIZED HEALTH CARE EDUCATION/TRAINING PROGRAM

## 2023-12-14 PROCEDURE — 86381 MITOCHONDRIAL ANTIBODY EACH: CPT | Performed by: INTERNAL MEDICINE

## 2023-12-14 RX ADMIN — MUPIROCIN 1 G: 20 OINTMENT TOPICAL at 09:12

## 2023-12-14 RX ADMIN — GABAPENTIN 600 MG: 300 CAPSULE ORAL at 09:12

## 2023-12-14 RX ADMIN — LEVETIRACETAM 500 MG: 500 TABLET, FILM COATED ORAL at 09:12

## 2023-12-14 RX ADMIN — MEROPENEM 500 MG: 500 INJECTION INTRAVENOUS at 04:12

## 2023-12-14 RX ADMIN — HYDRALAZINE HYDROCHLORIDE 100 MG: 25 TABLET, FILM COATED ORAL at 09:12

## 2023-12-14 RX ADMIN — PANTOPRAZOLE SODIUM 40 MG: 40 INJECTION, POWDER, FOR SOLUTION INTRAVENOUS at 06:12

## 2023-12-14 RX ADMIN — CARVEDILOL 25 MG: 25 TABLET, FILM COATED ORAL at 09:12

## 2023-12-14 NOTE — SUBJECTIVE & OBJECTIVE
Past Medical History:   Diagnosis Date    ESRD on hemodialysis 2022    Gastritis 2022    EGD was 22    Gastroparesis 2022    has not had Emptying study    Heart failure with preserved ejection fraction 2022    EF 55% on 3/22    History of supraventricular tachycardia     Hyperkalemia 2022    Hypertensive emergency 2022    Sickle cell trait 2022    Type 2 diabetes mellitus        Past Surgical History:   Procedure Laterality Date     SECTION      x 3    COLONOSCOPY      COLONOSCOPY N/A 2022    Procedure: COLONOSCOPY;  Surgeon: Jagdeep Cedeno MD;  Location: [facility];  Service: Endoscopy;  Laterality: N/A;    ESOPHAGOGASTRODUODENOSCOPY N/A 10/18/2019    Procedure: ESOPHAGOGASTRODUODENOSCOPY (EGD);  Surgeon: Gianluca Mendez MD;  Location: [facility];  Service: Endoscopy;  Laterality: N/A;    ESOPHAGOGASTRODUODENOSCOPY N/A 2022    Procedure: EGD (ESOPHAGOGASTRODUODENOSCOPY);  Surgeon: Micky Paredes III, MD;  Location: [facility];  Service: Endoscopy;  Laterality: N/A;    ESOPHAGOGASTRODUODENOSCOPY N/A 2022    Procedure: EGD (ESOPHAGOGASTRODUODENOSCOPY);  Surgeon: Marcelo Zhong MD;  Location: [facility];  Service: Endoscopy;  Laterality: N/A;    INSERTION, CATHETER, TUNNELED N/A 2023    Procedure: Insertion,catheter,tunneled;  Surgeon: Carlos Thurman Jr., MD;  Location: [facility] OR;  Service: General;  Laterality: N/A;    LAPAROSCOPIC CHOLECYSTECTOMY N/A 2022    Procedure: CHOLECYSTECTOMY, LAPAROSCOPIC;  Surgeon: Grey Perez MD;  Location: [facility] OR;  Service: General;  Laterality: N/A;    PLACEMENT OF DUAL-LUMEN VASCULAR CATHETER Left 2022    Procedure: INSERTION-CATHETER-JOSEPH;  Surgeon: Dionte Gan MD;  Location: [facility] OR;  Service: General;  Laterality: Left;    PLACEMENT OF DUAL-LUMEN VASCULAR CATHETER Right 2022    Procedure: INSERTION-CATHETER-Hemosplit;  Surgeon: Dionte Gan MD;  Location: [facility];   Service: General;  Laterality: Right;       Review of patient's allergies indicates:   Allergen Reactions    Penicillins Hives       No current facility-administered medications on file prior to encounter.     Current Outpatient Medications on File Prior to Encounter   Medication Sig    amLODIPine (NORVASC) 5 MG tablet Take 1 tablet (5 mg total) by mouth once daily. (Patient taking differently: Take 5 mg by mouth once daily.)    apixaban (ELIQUIS) 5 mg Tab Take 1 tablet (5 mg total) by mouth 2 (two) times daily.    carvediloL (COREG) 25 MG tablet Take 1 tablet (25 mg total) by mouth 2 (two) times daily.    cetirizine (ZYRTEC) 10 MG tablet Take 1 tablet (10 mg total) by mouth once daily.    doxycycline (VIBRAMYCIN) 100 MG Cap Take 1 capsule twice a day by oral route for 7 days, for infectoin. (Patient taking differently: Take 100 mg by mouth every 12 (twelve) hours.)    FLUoxetine 20 MG capsule Take 1 capsule (20 mg total) by mouth once daily.    fluticasone propionate (FLONASE ALLERGY RELIEF) 50 mcg/actuation nasal spray [place] 1 spray every day by intranasal route. (Patient taking differently: 1 spray by Each Nostril route Daily.)    gabapentin (NEURONTIN) 300 MG capsule Take 2 capsules twice a day by oral route for 90 days. (Patient taking differently: Take 600 mg by mouth 2 (two) times daily.)    hydrALAZINE (APRESOLINE) 100 MG tablet Take 1 tablet (100 mg total) by mouth 2 (two) times daily.    insulin aspart U-100 (NOVOLOG FLEXPEN U-100 INSULIN) 100 unit/mL (3 mL) InPn pen Inject 8 units 3 times a day by subcutaneous route before meals (Patient taking differently: Inject 8 Units into the skin 3 (three) times daily before meals.)    insulin detemir U-100, Levemir, (LEVEMIR FLEXTOUCH U100 INSULIN) 100 unit/mL (3 mL) InPn pen Inject 21 units every day by subcutaneous route in the evening for 90 days. (Patient taking differently: Inject 21 Units into the skin every evening.)    isosorbide mononitrate (IMDUR) 30 MG  24 hr tablet Take 1 tablet (30 mg total) by mouth once daily.    levETIRAcetam (KEPPRA) 500 MG Tab Take 1 tablet twice a day by oral route for 30 days. (Patient taking differently: Take 500 mg by mouth 2 (two) times daily.)    ondansetron (ZOFRAN-ODT) 4 MG TbDL Place 1 tablet (4 mg total) under the tongue every 8 (eight) hours.    oxyCODONE-acetaminophen (PERCOCET) 5-325 mg per tablet TAKE 1 TABLET EVERY 6 HOURS AS NEEDED FOP PAIN (Patient taking differently: Take 1 tablet by mouth every 6 (six) hours as needed for Pain.)    pantoprazole (PROTONIX) 40 MG tablet Take 1 tablet (40 mg total) by mouth once daily.    promethazine (PHENERGAN) 25 MG tablet Take 1 tablet (25 mg total) by mouth every 6 (six) hours as needed for 5 days. (Patient taking differently: Take 25 mg by mouth every 6 (six) hours as needed for Nausea.)    sevelamer carbonate (RENVELA) 800 mg Tab Take 2 tablets with meals three times a day (Patient taking differently: Take 1,600 mg by mouth 3 (three) times daily with meals.)    sucralfate (CARAFATE) 100 mg/mL suspension Take 10 mL 4 times a day by oral route before meals for 30 days. (Patient taking differently: Take 1 g by mouth 4 (four) times daily with meals and nightly.)    amLODIPine (NORVASC) 5 MG tablet Take 1 tablet every day by oral route for 90 days.    blood sugar diagnostic (TRUE METRIX GLUCOSE TEST STRIP) Strp Use 1 strip to check blood glucose three times daily    blood-glucose meter (TRUE METRIX GLUCOSE METER) Misc Use to check blood glucose    brimonidine 0.2% (ALPHAGAN) 0.2 % Drop Place 1 drop in right eye twice daily.    carvediloL (COREG) 25 MG tablet Take 1 tablet (25 mg total) by mouth 2 (two) times daily.    ciprofloxacin HCl (CILOXAN) 0.3 % ophthalmic solution instill 1 drop into the right eye 4 times a day for 30 days    FLUoxetine 20 MG capsule Take 1 capsule (20 mg total) by mouth once daily.    gabapentin (NEURONTIN) 300 MG capsule Take 2 capsules (600 mg total) by mouth 2  "(two) times daily.    insulin aspart U-100 (NOVOLOG FLEXPEN U-100 INSULIN) 100 unit/mL (3 mL) InPn pen Inject 10 units 3 times a day by subcutaneous route before meals for 90 days.    insulin detemir U-100, Levemir, (LEVEMIR FLEXTOUCH U100 INSULIN) 100 unit/mL (3 mL) InPn pen Inject 18 units every day by subcutaneous route in the evening    insulin detemir U-100, Levemir, (LEVEMIR FLEXTOUCH U100 INSULIN) 100 unit/mL (3 mL) InPn pen Inject 22 units every day by subcutaneous route in the evening for 90 days.    isosorbide mononitrate (IMDUR) 30 MG 24 hr tablet Take 1 tablet (30 mg total) by mouth once daily.    ketorolac 0.5% (ACULAR) 0.5 % Drop Instill 1 drop into the right eye 4 times a day for 30 days    lancets (TRUEPLUS LANCETS) 33 gauge Misc Use 1 to check blood glucose three times daily    lancets 33 gauge Misc Use to test twice daily    levETIRAcetam (KEPPRA) 500 MG Tab Take 1 tablet (500 mg total) by mouth 2 (two) times daily.    ondansetron (ZOFRAN-ODT) 4 MG TbDL Dissolve 1 tablet (4 mg total) by mouth every 8 (eight) hours.    pen needle, diabetic 31 gauge x 3/16" Ndle Use as directed to inject insulin 5 times daily    prednisoLONE acetate (PRED FORTE) 1 % DrpS Instill 1 drops into the right eye 4 times a day for 30 days    promethazine (PHENERGAN) 25 MG suppository Place 1 suppository (25 mg total) rectally every 6 (six) hours as needed for Nausea.    sucroferric oxyhydroxide (VELPHORO) 500 mg Chew Take 1 tablet 3 times a day (Patient taking differently: Take 1 tablet by mouth 3 (three) times daily.)    timolol maleate 0.5% (TIMOPTIC) 0.5 % Drop Instill 1 drop into the right eye 2 times a day for 30 days    [DISCONTINUED] atenoloL (TENORMIN) 50 MG tablet Take 1 tablet (50 mg total) by mouth every other day.    [DISCONTINUED] omeprazole (PRILOSEC) 20 MG capsule Take 2 capsules (40 mg total) by mouth once daily. for 10 days     Family History       Problem Relation (Age of Onset)    Diabetes Mother, Father "          Tobacco Use    Smoking status: Never    Smokeless tobacco: Never   Substance and Sexual Activity    Alcohol use: Not Currently    Drug use: No    Sexual activity: Not Currently     Partners: Male     Birth control/protection: I.U.D.     Review of Systems   Constitutional:  Positive for activity change and fatigue. Negative for appetite change, chills and fever.   HENT:  Negative for congestion, ear pain and nosebleeds.    Eyes:  Negative for itching and visual disturbance.   Respiratory:  Negative for apnea, cough, chest tightness, shortness of breath and wheezing.    Cardiovascular:  Negative for chest pain, palpitations and leg swelling.   Gastrointestinal:  Positive for abdominal pain and nausea. Negative for abdominal distention, constipation, diarrhea and vomiting.   Genitourinary:  Negative for dysuria and flank pain.   Musculoskeletal:  Negative for back pain.   Neurological:  Negative for dizziness and headaches.     Objective:     Vital Signs (Most Recent):  Temp: 97.7 °F (36.5 °C) (12/13/23 2001)  Pulse: 76 (12/13/23 2030)  Resp: 14 (12/13/23 2030)  BP: 121/64 (12/13/23 2030)  SpO2: 100 % (12/13/23 2030) Vital Signs (24h Range):  Temp:  [96.3 °F (35.7 °C)-98.8 °F (37.1 °C)] 97.7 °F (36.5 °C)  Pulse:  [68-91] 76  Resp:  [12-24] 14  SpO2:  [92 %-100 %] 100 %  BP: ()/() 121/64     Weight: 59.5 kg (131 lb 2.8 oz)  Body mass index is 23.99 kg/m².     Physical Exam  Vitals reviewed.   Constitutional:       General: She is not in acute distress.     Appearance: She is ill-appearing. She is not toxic-appearing or diaphoretic.   HENT:      Head: Normocephalic and atraumatic.      Mouth/Throat:      Mouth: Mucous membranes are moist.      Pharynx: Oropharynx is clear.   Eyes:      General: No scleral icterus.     Conjunctiva/sclera: Conjunctivae normal.   Neck:      Vascular: No carotid bruit.   Cardiovascular:      Rate and Rhythm: Normal rate and regular rhythm.      Pulses: Normal pulses.       Heart sounds: Normal heart sounds.   Pulmonary:      Effort: Pulmonary effort is normal.      Breath sounds: Normal breath sounds.   Abdominal:      General: Abdomen is flat. Bowel sounds are normal.      Palpations: Abdomen is soft.      Tenderness: There is abdominal tenderness.   Musculoskeletal:         General: Normal range of motion.   Skin:     General: Skin is warm.   Neurological:      General: No focal deficit present.      Mental Status: She is alert and oriented to person, place, and time. Mental status is at baseline.                Significant Labs: All pertinent labs within the past 24 hours have been reviewed.  BMP:   Recent Labs   Lab 12/13/23  0427 12/13/23  1557   * 194*    142   K 4.4 5.4*    104   CO2 20* 14*   BUN 32* 52*   CREATININE 6.4* 7.4*   CALCIUM 9.3 8.4*   MG 2.2  --      CBC:   Recent Labs   Lab 12/12/23  0905 12/13/23 0427 12/13/23  1557   WBC 5.70 13.78* 12.77*   HGB 6.2* 7.9* 6.7*   HCT 19.1* 23.6* 19.9*   * 89* 95*     CMP:   Recent Labs   Lab 12/12/23  0905 12/13/23 0427 12/13/23  1557    144 142   K 5.3* 4.4 5.4*    103 104   CO2 18* 20* 14*   * 112* 194*   BUN 54* 32* 52*   CREATININE 10.4* 6.4* 7.4*   CALCIUM 7.6* 9.3 8.4*   PROT 6.9 7.8 6.5   ALBUMIN 3.3* 3.7 3.0*   BILITOT 0.8 3.3* 4.0*   ALKPHOS 173* 240* 206*   AST 87* 175* 146*   ALT 73* 174* 137*   ANIONGAP 19* 21* 24*     Magnesium:   Recent Labs   Lab 12/13/23 0427   MG 2.2       Significant Imaging: I have reviewed all pertinent imaging results/findings within the past 24 hours.

## 2023-12-14 NOTE — NURSING
Call placed to [facility]/ICU. Spoke to Max. Notified that Ms. Howard is in route to [facility] per EMT.

## 2023-12-14 NOTE — CARE UPDATE
12/14/23 0801   Patient Assessment/Suction   Level of Consciousness (AVPU) alert   Respiratory Effort Normal;Unlabored   Expansion/Accessory Muscles/Retractions no use of accessory muscles   All Lung Fields Breath Sounds clear   PRE-TX-O2   Device (Oxygen Therapy) nasal cannula   $ Is the patient on Low Flow Oxygen? Yes   Flow (L/min) 2   SpO2 (!) 93 %   Pulse Oximetry Type Continuous   $ Pulse Oximetry - Multiple Charge Pulse Oximetry - Multiple   Pulse 79   Resp 13   Aerosol Therapy   $ Aerosol Therapy Charges PRN treatment not required   Respiratory Evaluation   $ Care Plan Tech Time 15 min

## 2023-12-14 NOTE — PLAN OF CARE
Problem: Oral Intake Inadequate  Goal: Improved Oral Intake  Outcome: Ongoing, Progressing  Intervention: Promote and Optimize Oral Intake  Flowsheets (Taken 12/14/2023 1301)  Oral Nutrition Promotion: nutritional therapy counseling provided

## 2023-12-14 NOTE — PROGRESS NOTES
"[facility]  Adult Nutrition   Progress Note (Initial Assessment)     SUMMARY     Recommendations  Recommendation/Intervention: 1. Recommend diet be initiated and advanced as medically able. Recommend renal, diabetic diet restrictions. 2. If unable to start oral diet within next 48 hours, consider enteral nutrition. Consider post pyloric feeding due to gastroparesis. Pt is at high risk for malnutrition.  Goals: 1. Diet or nutrition support to be initiated within 48 hours.  Nutrition Goal Status: new  Communication of RD Recs: reviewed with RN    Dietitian Rounds Brief  Pt assessed 2' MST 3. Pt has had poor oral intake due to gastroparesis and N/V per review of progress notes. N/V started 12/11, so patient has had poor/minimal intake x 4 days. Pt seen in ICU, getting dialysis at time of interview. Pt denies recent weight loss and states she was eating well prior to admission. Weight fluctuation and weight gain per review of weight history, no significant weight loss noted. Pt appears to be weak and is taking effort to answer questions, mostly replying "yep". No palpation to assess nutritional status at this time due to patient very weak and minimally answering questions.   Per HPI: past medical history of end-stage renal disease on hemodialysis TTS, chronic gastroparesis SVT hyperkalemia hypertensive emergency type 2 diabetes with diabetic gastroparesis pericardial effusion who presents the emergency room for evaluation of nausea and vomiting.   LBM: 12/12/23    Reason for Assessment  Reason For Assessment: identified at risk by screening criteria (MST 3)  Relevant Medical History: abdominal pain; ESRD on hemodyalysis, gastritis, gastroparesis, heart failure, sickle cell trait, type 2 DM    Nutrition Risk Screen  Nutrition Risk Screen: reduced oral intake over the last month     MST Score: 3  Have you recently lost weight without trying?: Unsure  Weight loss score: 2  Have you been eating poorly because " "of a decreased appetite?: Yes  Appetite score: 1       Nutrition/Diet History  Food Allergies: NKFA  Factors Affecting Nutritional Intake: NPO, abdominal pain, altered gastrointestinal function, nausea/vomiting    Anthropometrics  Temp: 98.3 °F (36.8 °C)  Height Method: Stated  Height: 5' 2" (157.5 cm)  Height (inches): 62 in  Weight Method: Bed Scale  Weight: 59.5 kg (131 lb 2.8 oz)  Weight (lb): 131.18 lb  Ideal Body Weight (IBW), Female: 110 lb  % Ideal Body Weight, Female (lb): 122.73 %  BMI (Calculated): 24  BMI Grade: 18.5-24.9 - normal       Weight History:  Wt Readings from Last 10 Encounters:   12/13/23 59.5 kg (131 lb 2.8 oz)   10/18/23 61.8 kg (136 lb 3.9 oz)   10/13/23 59.4 kg (130 lb 15.3 oz)   10/03/23 60.3 kg (132 lb 15 oz)   09/19/23 48.9 kg (107 lb 12.9 oz)   09/17/23 53.1 kg (117 lb)   09/10/23 54.7 kg (120 lb 9.5 oz)   09/11/23 54.7 kg (120 lb 9.5 oz)   08/17/23 55.9 kg (123 lb 3.2 oz)   08/01/23 54.9 kg (120 lb 15.2 oz)       Lab/Procedures/Meds: Pertinent Labs Reviewed    Clinical Chemistry:  Recent Labs   Lab 12/12/23  0905 12/12/23  0905 12/13/23  0427 12/13/23  1557 12/14/23  0409     --  144 142 142   K 5.3*  --  4.4 5.4* 5.2*     --  103 104 103   CO2 18*  --  20* 14* 25   *  --  112* 194* 130*   BUN 54*  --  32* 52* 62*   CREATININE 10.4*  --  6.4* 7.4* 7.8*   CALCIUM 7.6*  --  9.3 8.4* 7.9*   PROT 6.9  --  7.8 6.5 6.8   ALBUMIN 3.3*  --  3.7 3.0* 3.8   BILITOT 0.8  --  3.3* 4.0* 1.4*   ALKPHOS 173*  --  240* 206* 198*   AST 87*  --  175* 146* 103*   ALT 73*  --  174* 137* 104*   ANIONGAP 19*  --  21* 24* 14   MG  --    < > 2.2  --  2.4   PHOS  --   --  5.5*  --  7.9*   LIPASE 25  --   --   --   --     < > = values in this interval not displayed.       CBC:   Recent Labs   Lab 12/14/23  0513   WBC 14.07*   RBC 2.69*   HGB 8.0*   HCT 22.9*   PLT 90*   MCV 85   MCH 29.7   MCHC 34.9       Diabetes:  Recent Labs   Lab 12/13/23  1601 12/13/23  1620 12/13/23  1726 "   POCTGLUCOSE 194* 183* 167*       Medications: Pertinent Medications reviewed    Scheduled Meds:   amLODIPine  5 mg Oral Daily    carvediloL  25 mg Oral BID    epoetin teresa-epbx  100 Units/kg Subcutaneous Every Tues, Thurs, Sat    FLUoxetine  20 mg Oral Daily    gabapentin  600 mg Oral BID    hydrALAZINE  100 mg Oral BID    insulin aspart U-100  8 Units Subcutaneous TID AC    insulin detemir U-100 (Levemir)  21 Units Subcutaneous QHS    isosorbide mononitrate  30 mg Oral Daily    levETIRAcetam  500 mg Oral BID    meropenem (MERREM) IVPB  500 mg Intravenous Q24H    mupirocin   Nasal BID    polyethylene glycol  17 g Oral Daily           PRN Meds:.acetaminophen, albuterol-ipratropium, aluminum-magnesium hydroxide-simethicone, dextrose 10%, dextrose 10%, dextrose 50%, dextrose 50%, glucagon (human recombinant), glucose, glucose, insulin aspart U-100, melatonin, morphine, naloxone, ondansetron, prochlorperazine, simethicone, sodium chloride 0.9%    Estimated/Assessed Needs  Weight Used For Calorie Calculations: 59.5 kg (131 lb 2.8 oz)  Energy Calorie Requirements (kcal): 1488 - 1785 (25 - 30)  Energy Need Method: Kcal/kg  Protein Requirements: 72 - 89 (1.2 - 1.5)  Weight Used For Protein Calculations: 59.5 kg (131 lb 2.8 oz)     Estimated Fluid Requirement Method: RDA Method  RDA Method (mL): 1488       Nutrition Prescription Ordered  Current Diet Order: NPO    Evaluation of Received Nutrient/Fluid Intake  Energy Calories Required: not meeting needs  Protein Required: not meeting needs  Fluid Required: not meeting needs  Tolerance: other (see comments) (NPO)     Intake/Output Summary (Last 24 hours) at 12/14/2023 1300  Last data filed at 12/14/2023 0301  Gross per 24 hour   Intake 1430.36 ml   Output --   Net 1430.36 ml      % Intake of Estimated Energy Needs: 0%  % Meal Intake: NPO    Nutrition Risk  Level of Risk/Frequency of Follow-up: high     Monitor and Evaluation  Food and Nutrient Intake: energy intake  Food and  Nutrient Adminstration: diet order  Knowledge/Beliefs/Attitudes: food and nutrition knowledge/skill, beliefs and attitudes  Physical Activity and Function: nutrition-related ADLs and IADLs, factors affecting access to physical activity  Anthropometric Measurements: weight, body mass index, weight change  Biochemical Data, Medical Tests and Procedures: electrolyte and renal panel, inflammatory profile, gastrointestinal profile, lipid profile, glucose/endocrine profile  Nutrition-Focused Physical Findings: overall appearance     Nutrition Follow-Up  RD Follow-up?: Yes    Sally Berman RD 12/14/2023 1:01 PM

## 2023-12-14 NOTE — NURSING
Dr Clark updated on most recent chemistry results. Notified and aware this patient being sent out to [facility] ICU room 3022. No new orders at this time.

## 2023-12-14 NOTE — PROGRESS NOTES
GASTROENTEROLOGY INPATIENT CONSULT NOTE  Patient Name: Tabby Howard  Patient MRN: [id number]  Patient : 1990    Admit Date: 2023  Service date: 2023    Reason for Consult: elevated liver enzymes    PCP: Melony Kim NP    Chief Complaint   Patient presents with    Abdominal Pain     Beginning this AM with abdominal distention, dialysis treatment on Saturday       HPI: Patient is a 33 y.o. female with PMHx  sickle cell traits with h/o sickle cell crisis per patient, ESRD, gastroparesis admitted with acute on chronic elevation of liver enzymes.  Had reported pain to [facility] received iv narcotics and was noted to be hypotensive and hypoglycemic.  Remained afebrile.  Transferred to [facility] ICU.  Pt currently receiving HD and has no complaints.       Lab Results   Component Value Date    HEPAIGM Negative 2023    HEPBIGM Negative 2023    HEPBCAB Non-reactive 2023    HEPCAB 0.1 2023     Lab Results   Component Value Date     (H) 2023     (H) 2023    ALKPHOS 198 (H) 2023    BILITOT 1.4 (H) 2023     BMP  Lab Results   Component Value Date     2023    K 5.2 (H) 2023     2023    CO2 25 2023    BUN 62 (H) 2023    CREATININE 7.8 (H) 2023    CALCIUM 7.9 (L) 2023    ANIONGAP 14 2023    EGFRNORACEVR 6.4 (A) 2023     Lab Results   Component Value Date    WBC 14.07 (H) 2023    HGB 8.0 (L) 2023    HCT 22.9 (L) 2023    MCV 85 2023    PLT 90 (L) 2023       US abd  IMPRESSION:  1.  Hepatosplenomegaly.  2.  Decompressed gallbladder without cholelithiasis or sonographic evidence of cholecystitis.  3.  No biliary ductal dilatation.       Latest Reference Range & Units 22 14:51   Haptoglobin 30 - 250 mg/dL <10 (L)   (L): Data is abnormally low    Past Medical History:  Past Medical History:   Diagnosis Date    ESRD on hemodialysis 2022    Gastritis  2022    EGD was 22    Gastroparesis 2022    has not had Emptying study    Heart failure with preserved ejection fraction 2022    EF 55% on 3/22    History of supraventricular tachycardia     Hyperkalemia 2022    Hypertensive emergency 2022    Sickle cell trait 2022    Type 2 diabetes mellitus         Past Surgical History:  Past Surgical History:   Procedure Laterality Date     SECTION      x 3    COLONOSCOPY      COLONOSCOPY N/A 2022    Procedure: COLONOSCOPY;  Surgeon: Jagdeep Cedeno MD;  Location: [facility];  Service: Endoscopy;  Laterality: N/A;    ESOPHAGOGASTRODUODENOSCOPY N/A 10/18/2019    Procedure: ESOPHAGOGASTRODUODENOSCOPY (EGD);  Surgeon: Gianluca Mendez MD;  Location: [facility];  Service: Endoscopy;  Laterality: N/A;    ESOPHAGOGASTRODUODENOSCOPY N/A 2022    Procedure: EGD (ESOPHAGOGASTRODUODENOSCOPY);  Surgeon: Micky Paredes III, MD;  Location: [facility];  Service: Endoscopy;  Laterality: N/A;    ESOPHAGOGASTRODUODENOSCOPY N/A 2022    Procedure: EGD (ESOPHAGOGASTRODUODENOSCOPY);  Surgeon: Marcelo Zhong MD;  Location: [facility];  Service: Endoscopy;  Laterality: N/A;    INSERTION, CATHETER, TUNNELED N/A 2023    Procedure: Insertion,catheter,tunneled;  Surgeon: Carlos Thurman Jr., MD;  Location: [facility];  Service: General;  Laterality: N/A;    LAPAROSCOPIC CHOLECYSTECTOMY N/A 2022    Procedure: CHOLECYSTECTOMY, LAPAROSCOPIC;  Surgeon: Grey Perez MD;  Location: [facility];  Service: General;  Laterality: N/A;    PLACEMENT OF DUAL-LUMEN VASCULAR CATHETER Left 2022    Procedure: INSERTION-CATHETER-JOSEPH;  Surgeon: Dionte Gan MD;  Location: [facility] OR;  Service: General;  Laterality: Left;    PLACEMENT OF DUAL-LUMEN VASCULAR CATHETER Right 2022    Procedure: INSERTION-CATHETER-Hemosplit;  Surgeon: Dionte Gan MD;  Location: [facility] OR;  Service: General;  Laterality: Right;        Home  Medications:  Facility-Administered Medications Prior to Admission   Medication Dose Route Frequency Provider Last Rate Last Admin    0.9%  NaCl infusion (for blood administration)   Intravenous Q24H PRN Geeta Kaur NP   Stopped at 10/27/23 1035     Medications Prior to Admission   Medication Sig Dispense Refill Last Dose    amLODIPine (NORVASC) 5 MG tablet Take 1 tablet (5 mg total) by mouth once daily. (Patient taking differently: Take 5 mg by mouth once daily.) 30 tablet 1 12/11/2023 at 08:00    apixaban (ELIQUIS) 5 mg Tab Take 1 tablet (5 mg total) by mouth 2 (two) times daily. 180 tablet 1 12/11/2023 at 20:00    carvediloL (COREG) 25 MG tablet Take 1 tablet (25 mg total) by mouth 2 (two) times daily. 60 tablet 5 12/11/2023 at 20:00    cetirizine (ZYRTEC) 10 MG tablet Take 1 tablet (10 mg total) by mouth once daily. 30 tablet 0 12/11/2023 at 08:00    doxycycline (VIBRAMYCIN) 100 MG Cap Take 1 capsule twice a day by oral route for 7 days, for infectoin. (Patient taking differently: Take 100 mg by mouth every 12 (twelve) hours.) 14 capsule 0 12/11/2023 at 20:00    FLUoxetine 20 MG capsule Take 1 capsule (20 mg total) by mouth once daily. 30 capsule 5 12/11/2023 at 08:00    fluticasone propionate (FLONASE ALLERGY RELIEF) 50 mcg/actuation nasal spray [place] 1 spray every day by intranasal route. (Patient taking differently: 1 spray by Each Nostril route Daily.) 16 g 2 Past Week    gabapentin (NEURONTIN) 300 MG capsule Take 2 capsules twice a day by oral route for 90 days. (Patient taking differently: Take 600 mg by mouth 2 (two) times daily.) 360 capsule 1 12/11/2023 at 20:00    hydrALAZINE (APRESOLINE) 100 MG tablet Take 1 tablet (100 mg total) by mouth 2 (two) times daily. 180 tablet 1 12/11/2023 at 20:00    insulin aspart U-100 (NOVOLOG FLEXPEN U-100 INSULIN) 100 unit/mL (3 mL) InPn pen Inject 8 units 3 times a day by subcutaneous route before meals (Patient taking differently: Inject 8 Units into the  skin 3 (three) times daily before meals.) 24 mL 1 12/11/2023 at 20:00    insulin detemir U-100, Levemir, (LEVEMIR FLEXTOUCH U100 INSULIN) 100 unit/mL (3 mL) InPn pen Inject 21 units every day by subcutaneous route in the evening for 90 days. (Patient taking differently: Inject 21 Units into the skin every evening.) 15 mL 1 12/11/2023 at 20:00    isosorbide mononitrate (IMDUR) 30 MG 24 hr tablet Take 1 tablet (30 mg total) by mouth once daily. 90 tablet 1 12/11/2023 at 08:00    levETIRAcetam (KEPPRA) 500 MG Tab Take 1 tablet twice a day by oral route for 30 days. (Patient taking differently: Take 500 mg by mouth 2 (two) times daily.) 60 tablet 2 12/11/2023 at 20:00    ondansetron (ZOFRAN-ODT) 4 MG TbDL Place 1 tablet (4 mg total) under the tongue every 8 (eight) hours. 24 tablet 2 Past Week    oxyCODONE-acetaminophen (PERCOCET) 5-325 mg per tablet TAKE 1 TABLET EVERY 6 HOURS AS NEEDED FOP PAIN (Patient taking differently: Take 1 tablet by mouth every 6 (six) hours as needed for Pain.) 15 tablet 0 Past Month    pantoprazole (PROTONIX) 40 MG tablet Take 1 tablet (40 mg total) by mouth once daily. 90 tablet 1 12/11/2023 at 08:00    promethazine (PHENERGAN) 25 MG tablet Take 1 tablet (25 mg total) by mouth every 6 (six) hours as needed for 5 days. (Patient taking differently: Take 25 mg by mouth every 6 (six) hours as needed for Nausea.) 20 tablet 2 Past Month    sevelamer carbonate (RENVELA) 800 mg Tab Take 2 tablets with meals three times a day (Patient taking differently: Take 1,600 mg by mouth 3 (three) times daily with meals.) 180 tablet 11 12/11/2023 at 20:00    sucralfate (CARAFATE) 100 mg/mL suspension Take 10 mL 4 times a day by oral route before meals for 30 days. (Patient taking differently: Take 1 g by mouth 4 (four) times daily with meals and nightly.) 1200 mL 1 12/11/2023 at 20:00    amLODIPine (NORVASC) 5 MG tablet Take 1 tablet every day by oral route for 90 days. 90 tablet 1     blood sugar diagnostic  "(TRUE METRIX GLUCOSE TEST STRIP) Strp Use 1 strip to check blood glucose three times daily 100 each 11     blood-glucose meter (TRUE METRIX GLUCOSE METER) Misc Use to check blood glucose 1 each 0     brimonidine 0.2% (ALPHAGAN) 0.2 % Drop Place 1 drop in right eye twice daily. 10 mL 5     carvediloL (COREG) 25 MG tablet Take 1 tablet (25 mg total) by mouth 2 (two) times daily. 60 tablet 2     ciprofloxacin HCl (CILOXAN) 0.3 % ophthalmic solution instill 1 drop into the right eye 4 times a day for 30 days 5 mL 0     FLUoxetine 20 MG capsule Take 1 capsule (20 mg total) by mouth once daily. 90 capsule 1     gabapentin (NEURONTIN) 300 MG capsule Take 2 capsules (600 mg total) by mouth 2 (two) times daily. 360 capsule 1     insulin aspart U-100 (NOVOLOG FLEXPEN U-100 INSULIN) 100 unit/mL (3 mL) InPn pen Inject 10 units 3 times a day by subcutaneous route before meals for 90 days. 27 mL 2     insulin detemir U-100, Levemir, (LEVEMIR FLEXTOUCH U100 INSULIN) 100 unit/mL (3 mL) InPn pen Inject 18 units every day by subcutaneous route in the evening 18 mL 1     insulin detemir U-100, Levemir, (LEVEMIR FLEXTOUCH U100 INSULIN) 100 unit/mL (3 mL) InPn pen Inject 22 units every day by subcutaneous route in the evening for 90 days. 18 mL 1     isosorbide mononitrate (IMDUR) 30 MG 24 hr tablet Take 1 tablet (30 mg total) by mouth once daily. 30 tablet 1     ketorolac 0.5% (ACULAR) 0.5 % Drop Instill 1 drop into the right eye 4 times a day for 30 days 3 mL 5     lancets (TRUEPLUS LANCETS) 33 gauge Misc Use 1 to check blood glucose three times daily 100 each 11     lancets 33 gauge Misc Use to test twice daily 100 each 5     levETIRAcetam (KEPPRA) 500 MG Tab Take 1 tablet (500 mg total) by mouth 2 (two) times daily. 60 tablet 2     ondansetron (ZOFRAN-ODT) 4 MG TbDL Dissolve 1 tablet (4 mg total) by mouth every 8 (eight) hours. 24 tablet 2     pen needle, diabetic 31 gauge x 3/16" Ndle Use as directed to inject insulin 5 times daily " 100 each 11     prednisoLONE acetate (PRED FORTE) 1 % DrpS Instill 1 drops into the right eye 4 times a day for 30 days 5 mL 1     promethazine (PHENERGAN) 25 MG suppository Place 1 suppository (25 mg total) rectally every 6 (six) hours as needed for Nausea. 10 suppository 0     sucroferric oxyhydroxide (VELPHORO) 500 mg Chew Take 1 tablet 3 times a day (Patient taking differently: Take 1 tablet by mouth 3 (three) times daily.) 90 tablet 6     timolol maleate 0.5% (TIMOPTIC) 0.5 % Drop Instill 1 drop into the right eye 2 times a day for 30 days 5 mL 6        Inpatient Medications:   amLODIPine  5 mg Oral Daily    carvediloL  25 mg Oral BID    epoetin teresa-epbx  100 Units/kg Subcutaneous Every Tues, Thurs, Sat    FLUoxetine  20 mg Oral Daily    gabapentin  600 mg Oral BID    hydrALAZINE  100 mg Oral BID    insulin aspart U-100  8 Units Subcutaneous TID AC    insulin detemir U-100 (Levemir)  21 Units Subcutaneous QHS    isosorbide mononitrate  30 mg Oral Daily    levETIRAcetam  500 mg Oral BID    meropenem (MERREM) IVPB  500 mg Intravenous Q24H    mupirocin   Nasal BID    polyethylene glycol  17 g Oral Daily     acetaminophen, albuterol-ipratropium, aluminum-magnesium hydroxide-simethicone, dextrose 10%, dextrose 10%, dextrose 50%, dextrose 50%, glucagon (human recombinant), glucose, glucose, insulin aspart U-100, melatonin, morphine, naloxone, ondansetron, prochlorperazine, simethicone, sodium chloride 0.9%    Review of patient's allergies indicates:   Allergen Reactions    Penicillins Hives       Social History:   Social History     Occupational History    Not on file   Tobacco Use    Smoking status: Never    Smokeless tobacco: Never   Substance and Sexual Activity    Alcohol use: Not Currently    Drug use: No    Sexual activity: Not Currently     Partners: Male     Birth control/protection: I.U.D.       Family History:   Family History   Problem Relation Age of Onset    Diabetes Mother     Diabetes Father   "      Review of Systems:  A 10 point review of systems was performed and was normal, except as mentioned in the HPI, including constitutional, HEENT, heme, lymph, cardiovascular, respiratory, gastrointestinal, genitourinary, neurologic, endocrine, psychiatric and musculoskeletal.      OBJECTIVE:    Physical Exam:  24 Hour Vital Sign Ranges: Temp:  [97.6 °F (36.4 °C)-99.5 °F (37.5 °C)] 98.3 °F (36.8 °C)  Pulse:  [68-82] 76  Resp:  [11-27] 12  SpO2:  [91 %-100 %] 97 %  BP: ()/(61-89) 142/81  Most recent vitals: BP (!) 142/81   Pulse 76   Temp 98.3 °F (36.8 °C) (Oral)   Resp 12   Ht 5' 2" (1.575 m)   Wt 59.5 kg (131 lb 2.8 oz)   SpO2 97%   Breastfeeding No   BMI 23.99 kg/m²    GEN: well-developed, well-nourished, awake and alert, non-toxic appearing adult  HEENT: PERRL, sclera anicteric, oral mucosa pink and moist without lesion  NECK: trachea midline; Good ROM  CV: regular rate and rhythm, no murmurs or gallops  RESP: clear to auscultation bilaterally, no wheezes, rhonci or rales  ABD: soft, non-tender, non-distended, normal bowel sounds  EXT: no swelling or edema, 2+ pulses distally  SKIN: no rashes or jaundice  PSYCH: normal affect    Labs:   Recent Labs     12/13/23  0427 12/13/23  1557 12/14/23  0513   WBC 13.78* 12.77* 14.07*   MCV 86 86 85   PLT 89* 95* 90*     Recent Labs     12/13/23  0427 12/13/23  1557 12/14/23  0409    142 142   K 4.4 5.4* 5.2*    104 103   CO2 20* 14* 25   BUN 32* 52* 62*   * 194* 130*     No results for input(s): "ALB" in the last 72 hours.    Invalid input(s): "ALKP", "SGOT", "SGPT", "TBIL", "DBIL", "TPRO"  Recent Labs     12/13/23  1517   INR 1.3*         Radiology Review:  US Pelvis Complete Non OB   Final Result      US Abdomen Complete   Final Result      MRI MRCP Without Contrast    (Results Pending)     EGD 2022 normal  Colon 2022- poor visualization    IMPRESSION / RECOMMENDATIONS:  33 F h/o sickle cell with chronically elevated LFTS with acute " exacerbation in setting of dropping hgb.  H/h has been low for years requiring blood transfusions with past low haptoglobin.  Overall suspect this might be sickle cell crisis type picture with hemolytic anemia.  CBD normal caliber and s/p CCY.  Last echo without significant pericardial effusion making congestive hepatopathy less likely.  Negative hep panel earlier this year  -recommend repeat TTE and hematology consultation  -MRCP pending but unlikely to be positive for cbd stone.    Thank you for this consult.    Jagdeep Cedeno  12/14/2023  1:38 PM

## 2023-12-14 NOTE — NURSING
Resting with closed eyes. Awakes to verbal and light tactile stimuli. Answers basic (ie name/birthdate) questions correctly. Immediately returns to restring with closed eyes. PUPIL sluggish, reactive. Respirations even and non labored per O2 4 liters NC. No distress noted. Squeezes hands with equal  on command, bilateral. Wiggles toes on command, bilateral. IV antibiotic infusing per pump to rt a/c, with site free of s/s of infiltration. VSS. Sinus on monitor. Dialysis port noted to rt upper CW with dsg intact/un accessed.

## 2023-12-14 NOTE — NURSING
EMT's at bedside. Full report given. Questions answered and encouraged. Transferred to stretcher per EMT staff. All monitor connected.

## 2023-12-14 NOTE — ASSESSMENT & PLAN NOTE
Noted. Patient with suspected gastroparesis  GI consult done- possible cholangitis- no fever though  Pending MRCP  Keep NPO just in case if needs ERCP

## 2023-12-14 NOTE — H&P
[facility] Medicine  History & Physical    Patient Name: Tabby Howard  MRN: [id number]  Patient Class: IP- Inpatient  Admission Date: 2023  Attending Physician: David Awan MD  Primary Care Provider: Melony Kim NP         Patient information was obtained from patient and ER records.     Subjective:     Principal Problem:Abdominal pain    Chief Complaint:   Chief Complaint   Patient presents with    Abdominal Pain     Beginning this AM with abdominal distention, dialysis treatment on Saturday        HPI: Ms. Howard is a 33-year-old female with history of ESRD on HD, suspected gastroparesis, sickle cell trait, hypertension, pericardial effusion, type 2 diabetes who was admitted at [facility] for n/v and was found to be in acidosis, anemic and hyperkalemic- Hd was done yesterday and 2 units were tranfused. Today at the other [place] other abdominal pain got worse, LFTs went up and she also had RRT for lethargy, low BP, low BG and for the concern of cholangitis she was transferred here. GI consult was also done there and pending MRCP.       Pt was seen in ICU and was able to give some hx, was telling her abdominal pain is 10/10, sharp happening at r side and its different from when she had gall stones removed in the past. BP stable, pulse regular, Hb is still low- transfusing one more unit and sending for MRCP.             Past Medical History:   Diagnosis Date    ESRD on hemodialysis 2022    Gastritis 2022    EGD was 22    Gastroparesis 2022    has not had Emptying study    Heart failure with preserved ejection fraction 2022    EF 55% on 3/22    History of supraventricular tachycardia     Hyperkalemia 2022    Hypertensive emergency 2022    Sickle cell trait 2022    Type 2 diabetes mellitus        Past Surgical History:   Procedure Laterality Date     SECTION      x 3    COLONOSCOPY      COLONOSCOPY N/A 2022    Procedure:  COLONOSCOPY;  Surgeon: Jagdeep Cedeno MD;  Location: [facility];  Service: Endoscopy;  Laterality: N/A;    ESOPHAGOGASTRODUODENOSCOPY N/A 10/18/2019    Procedure: ESOPHAGOGASTRODUODENOSCOPY (EGD);  Surgeon: Gianluca Mendez MD;  Location: [facility] ENDO;  Service: Endoscopy;  Laterality: N/A;    ESOPHAGOGASTRODUODENOSCOPY N/A 08/24/2022    Procedure: EGD (ESOPHAGOGASTRODUODENOSCOPY);  Surgeon: Micky Paredes III, MD;  Location: [facility] ENDO;  Service: Endoscopy;  Laterality: N/A;    ESOPHAGOGASTRODUODENOSCOPY N/A 12/5/2022    Procedure: EGD (ESOPHAGOGASTRODUODENOSCOPY);  Surgeon: Marcelo Zhong MD;  Location: [facility];  Service: Endoscopy;  Laterality: N/A;    INSERTION, CATHETER, TUNNELED N/A 6/17/2023    Procedure: Insertion,catheter,tunneled;  Surgeon: Carlos Thurman Jr., MD;  Location: [facility] OR;  Service: General;  Laterality: N/A;    LAPAROSCOPIC CHOLECYSTECTOMY N/A 07/30/2022    Procedure: CHOLECYSTECTOMY, LAPAROSCOPIC;  Surgeon: Grey Perez MD;  Location: [facility] OR;  Service: General;  Laterality: N/A;    PLACEMENT OF DUAL-LUMEN VASCULAR CATHETER Left 07/12/2022    Procedure: INSERTION-CATHETER-JOSEPH;  Surgeon: Dionte Gan MD;  Location: [facility] OR;  Service: General;  Laterality: Left;    PLACEMENT OF DUAL-LUMEN VASCULAR CATHETER Right 07/26/2022    Procedure: INSERTION-CATHETER-Hemosplit;  Surgeon: Dionte Gan MD;  Location: [facility] OR;  Service: General;  Laterality: Right;       Review of patient's allergies indicates:   Allergen Reactions    Penicillins Hives       No current facility-administered medications on file prior to encounter.     Current Outpatient Medications on File Prior to Encounter   Medication Sig    amLODIPine (NORVASC) 5 MG tablet Take 1 tablet (5 mg total) by mouth once daily. (Patient taking differently: Take 5 mg by mouth once daily.)    apixaban (ELIQUIS) 5 mg Tab Take 1 tablet (5 mg total) by mouth 2 (two) times daily.    carvediloL (COREG) 25 MG tablet Take 1 tablet  (25 mg total) by mouth 2 (two) times daily.    cetirizine (ZYRTEC) 10 MG tablet Take 1 tablet (10 mg total) by mouth once daily.    doxycycline (VIBRAMYCIN) 100 MG Cap Take 1 capsule twice a day by oral route for 7 days, for infectoin. (Patient taking differently: Take 100 mg by mouth every 12 (twelve) hours.)    FLUoxetine 20 MG capsule Take 1 capsule (20 mg total) by mouth once daily.    fluticasone propionate (FLONASE ALLERGY RELIEF) 50 mcg/actuation nasal spray [place] 1 spray every day by intranasal route. (Patient taking differently: 1 spray by Each Nostril route Daily.)    gabapentin (NEURONTIN) 300 MG capsule Take 2 capsules twice a day by oral route for 90 days. (Patient taking differently: Take 600 mg by mouth 2 (two) times daily.)    hydrALAZINE (APRESOLINE) 100 MG tablet Take 1 tablet (100 mg total) by mouth 2 (two) times daily.    insulin aspart U-100 (NOVOLOG FLEXPEN U-100 INSULIN) 100 unit/mL (3 mL) InPn pen Inject 8 units 3 times a day by subcutaneous route before meals (Patient taking differently: Inject 8 Units into the skin 3 (three) times daily before meals.)    insulin detemir U-100, Levemir, (LEVEMIR FLEXTOUCH U100 INSULIN) 100 unit/mL (3 mL) InPn pen Inject 21 units every day by subcutaneous route in the evening for 90 days. (Patient taking differently: Inject 21 Units into the skin every evening.)    isosorbide mononitrate (IMDUR) 30 MG 24 hr tablet Take 1 tablet (30 mg total) by mouth once daily.    levETIRAcetam (KEPPRA) 500 MG Tab Take 1 tablet twice a day by oral route for 30 days. (Patient taking differently: Take 500 mg by mouth 2 (two) times daily.)    ondansetron (ZOFRAN-ODT) 4 MG TbDL Place 1 tablet (4 mg total) under the tongue every 8 (eight) hours.    oxyCODONE-acetaminophen (PERCOCET) 5-325 mg per tablet TAKE 1 TABLET EVERY 6 HOURS AS NEEDED FOP PAIN (Patient taking differently: Take 1 tablet by mouth every 6 (six) hours as needed for Pain.)    pantoprazole (PROTONIX) 40 MG tablet  Take 1 tablet (40 mg total) by mouth once daily.    promethazine (PHENERGAN) 25 MG tablet Take 1 tablet (25 mg total) by mouth every 6 (six) hours as needed for 5 days. (Patient taking differently: Take 25 mg by mouth every 6 (six) hours as needed for Nausea.)    sevelamer carbonate (RENVELA) 800 mg Tab Take 2 tablets with meals three times a day (Patient taking differently: Take 1,600 mg by mouth 3 (three) times daily with meals.)    sucralfate (CARAFATE) 100 mg/mL suspension Take 10 mL 4 times a day by oral route before meals for 30 days. (Patient taking differently: Take 1 g by mouth 4 (four) times daily with meals and nightly.)    amLODIPine (NORVASC) 5 MG tablet Take 1 tablet every day by oral route for 90 days.    blood sugar diagnostic (TRUE METRIX GLUCOSE TEST STRIP) Strp Use 1 strip to check blood glucose three times daily    blood-glucose meter (TRUE METRIX GLUCOSE METER) Misc Use to check blood glucose    brimonidine 0.2% (ALPHAGAN) 0.2 % Drop Place 1 drop in right eye twice daily.    carvediloL (COREG) 25 MG tablet Take 1 tablet (25 mg total) by mouth 2 (two) times daily.    ciprofloxacin HCl (CILOXAN) 0.3 % ophthalmic solution instill 1 drop into the right eye 4 times a day for 30 days    FLUoxetine 20 MG capsule Take 1 capsule (20 mg total) by mouth once daily.    gabapentin (NEURONTIN) 300 MG capsule Take 2 capsules (600 mg total) by mouth 2 (two) times daily.    insulin aspart U-100 (NOVOLOG FLEXPEN U-100 INSULIN) 100 unit/mL (3 mL) InPn pen Inject 10 units 3 times a day by subcutaneous route before meals for 90 days.    insulin detemir U-100, Levemir, (LEVEMIR FLEXTOUCH U100 INSULIN) 100 unit/mL (3 mL) InPn pen Inject 18 units every day by subcutaneous route in the evening    insulin detemir U-100, Levemir, (LEVEMIR FLEXTOUCH U100 INSULIN) 100 unit/mL (3 mL) InPn pen Inject 22 units every day by subcutaneous route in the evening for 90 days.    isosorbide mononitrate (IMDUR) 30 MG 24 hr tablet Take  "1 tablet (30 mg total) by mouth once daily.    ketorolac 0.5% (ACULAR) 0.5 % Drop Instill 1 drop into the right eye 4 times a day for 30 days    lancets (TRUEPLUS LANCETS) 33 gauge Misc Use 1 to check blood glucose three times daily    lancets 33 gauge Misc Use to test twice daily    levETIRAcetam (KEPPRA) 500 MG Tab Take 1 tablet (500 mg total) by mouth 2 (two) times daily.    ondansetron (ZOFRAN-ODT) 4 MG TbDL Dissolve 1 tablet (4 mg total) by mouth every 8 (eight) hours.    pen needle, diabetic 31 gauge x 3/16" Ndle Use as directed to inject insulin 5 times daily    prednisoLONE acetate (PRED FORTE) 1 % DrpS Instill 1 drops into the right eye 4 times a day for 30 days    promethazine (PHENERGAN) 25 MG suppository Place 1 suppository (25 mg total) rectally every 6 (six) hours as needed for Nausea.    sucroferric oxyhydroxide (VELPHORO) 500 mg Chew Take 1 tablet 3 times a day (Patient taking differently: Take 1 tablet by mouth 3 (three) times daily.)    timolol maleate 0.5% (TIMOPTIC) 0.5 % Drop Instill 1 drop into the right eye 2 times a day for 30 days    [DISCONTINUED] atenoloL (TENORMIN) 50 MG tablet Take 1 tablet (50 mg total) by mouth every other day.    [DISCONTINUED] omeprazole (PRILOSEC) 20 MG capsule Take 2 capsules (40 mg total) by mouth once daily. for 10 days     Family History       Problem Relation (Age of Onset)    Diabetes Mother, Father          Tobacco Use    Smoking status: Never    Smokeless tobacco: Never   Substance and Sexual Activity    Alcohol use: Not Currently    Drug use: No    Sexual activity: Not Currently     Partners: Male     Birth control/protection: I.U.D.     Review of Systems   Constitutional:  Positive for activity change and fatigue. Negative for appetite change, chills and fever.   HENT:  Negative for congestion, ear pain and nosebleeds.    Eyes:  Negative for itching and visual disturbance.   Respiratory:  Negative for apnea, cough, chest tightness, shortness of breath and " wheezing.    Cardiovascular:  Negative for chest pain, palpitations and leg swelling.   Gastrointestinal:  Positive for abdominal pain and nausea. Negative for abdominal distention, constipation, diarrhea and vomiting.   Genitourinary:  Negative for dysuria and flank pain.   Musculoskeletal:  Negative for back pain.   Neurological:  Negative for dizziness and headaches.     Objective:     Vital Signs (Most Recent):  Temp: 97.7 °F (36.5 °C) (12/13/23 2001)  Pulse: 76 (12/13/23 2030)  Resp: 14 (12/13/23 2030)  BP: 121/64 (12/13/23 2030)  SpO2: 100 % (12/13/23 2030) Vital Signs (24h Range):  Temp:  [96.3 °F (35.7 °C)-98.8 °F (37.1 °C)] 97.7 °F (36.5 °C)  Pulse:  [68-91] 76  Resp:  [12-24] 14  SpO2:  [92 %-100 %] 100 %  BP: ()/() 121/64     Weight: 59.5 kg (131 lb 2.8 oz)  Body mass index is 23.99 kg/m².     Physical Exam  Vitals reviewed.   Constitutional:       General: She is not in acute distress.     Appearance: She is ill-appearing. She is not toxic-appearing or diaphoretic.   HENT:      Head: Normocephalic and atraumatic.      Mouth/Throat:      Mouth: Mucous membranes are moist.      Pharynx: Oropharynx is clear.   Eyes:      General: No scleral icterus.     Conjunctiva/sclera: Conjunctivae normal.   Neck:      Vascular: No carotid bruit.   Cardiovascular:      Rate and Rhythm: Normal rate and regular rhythm.      Pulses: Normal pulses.      Heart sounds: Normal heart sounds.   Pulmonary:      Effort: Pulmonary effort is normal.      Breath sounds: Normal breath sounds.   Abdominal:      General: Abdomen is flat. Bowel sounds are normal.      Palpations: Abdomen is soft.      Tenderness: There is abdominal tenderness.   Musculoskeletal:         General: Normal range of motion.   Skin:     General: Skin is warm.   Neurological:      General: No focal deficit present.      Mental Status: She is alert and oriented to person, place, and time. Mental status is at baseline.                Significant Labs:  All pertinent labs within the past 24 hours have been reviewed.  BMP:   Recent Labs   Lab 12/13/23  0427 12/13/23  1557   * 194*    142   K 4.4 5.4*    104   CO2 20* 14*   BUN 32* 52*   CREATININE 6.4* 7.4*   CALCIUM 9.3 8.4*   MG 2.2  --      CBC:   Recent Labs   Lab 12/12/23  0905 12/13/23 0427 12/13/23  1557   WBC 5.70 13.78* 12.77*   HGB 6.2* 7.9* 6.7*   HCT 19.1* 23.6* 19.9*   * 89* 95*     CMP:   Recent Labs   Lab 12/12/23  0905 12/13/23 0427 12/13/23  1557    144 142   K 5.3* 4.4 5.4*    103 104   CO2 18* 20* 14*   * 112* 194*   BUN 54* 32* 52*   CREATININE 10.4* 6.4* 7.4*   CALCIUM 7.6* 9.3 8.4*   PROT 6.9 7.8 6.5   ALBUMIN 3.3* 3.7 3.0*   BILITOT 0.8 3.3* 4.0*   ALKPHOS 173* 240* 206*   AST 87* 175* 146*   ALT 73* 174* 137*   ANIONGAP 19* 21* 24*     Magnesium:   Recent Labs   Lab 12/13/23  0427   MG 2.2       Significant Imaging: I have reviewed all pertinent imaging results/findings within the past 24 hours.  Assessment/Plan:     * Abdominal pain  Noted. Patient with suspected gastroparesis  GI consult done- possible cholangitis- no fever though  Pending MRCP  Keep NPO just in case if needs ERCP      Metabolic acidosis  Nephrology consulted for HD      Hypertension  Please use holding parameters with all BP meds      Hyperkalemia  This patient has hyperkalemia which resolved with HD.. We will monitor for arrhythmias with EKG or continuous telemetry. We will treat the hyperkalemia with HD. The likely etiology of the hyperkalemia is ESRD.  The patients latest potassium has been reviewed and the results are listed below  Recent Labs   Lab 12/13/23  0427   K 4.4               Anemia in ESRD (end-stage renal disease)  Hb is still low- giving one more PRBc    Chronic congestive heart failure with left ventricular diastolic dysfunction  Patient is identified as having Diastolic (HFpEF) heart failure that is Chronic. CHF is currently controlled. Latest ECHO performed  "and demonstrates- Results for orders placed during the hospital encounter of 09/10/23    Echo    Interpretation Summary    Left Ventricle: The left ventricle is normal in size. Mildly increased ventricular mass. Mildly increased wall thickness. Normal wall motion. There is low normal systolic function with a visually estimated ejection fraction of 50 - 55%. There is normal diastolic function.    Right Ventricle: Normal right ventricular cavity size. Wall thickness is normal. Right ventricle wall motion  is normal. Systolic function is normal.    IVC/SVC: Normal venous pressure at 3 mmHg.    Pericardium: There is a small circumferential effusion. Pericardial effusion is echolucent. No indication of cardiac tamponade. Evidence includes no chamber collapse.  . Continue Beta Blocker and ACE/ARB and monitor clinical status closely. Monitor on telemetry. Patient is off CHF pathway.  Monitor strict Is&Os and daily weights.  Place on fluid restriction of 1.5 L. Cardiology has not been consulted. Continue to stress to patient importance of self efficacy and  on diet for CHF. Last BNP reviewed- and noted below No results for input(s): "BNP", "BNPTRIAGEBLO" in the last 168 hours.    Type 2 diabetes mellitus with hyperglycemia, with long-term current use of insulin, previous HbA1c 5.8%  Patient's FSGs are controlled on current medication regimen.  Last A1c reviewed-   Lab Results   Component Value Date    HGBA1C 5.8 08/01/2023     Most recent fingerstick glucose reviewed-   Recent Labs   Lab 12/12/23  2131   POCTGLUCOSE 132*       Current correctional scale  Medium  Maintain anti-hyperglycemic dose as follows-   Antihyperglycemics (From admission, onward)      Start     Stop Route Frequency Ordered    12/13/23 0645  insulin aspart U-100 pen 8 Units         -- SubQ 3 times daily before meals 12/12/23 1728    12/12/23 2100  insulin detemir U-100 (Levemir) pen 21 Units         -- SubQ Nightly 12/12/23 1728    12/12/23 1728  " insulin aspart U-100 pen 1-10 Units         -- SubQ Before meals & nightly PRN 12/12/23 1728          Hold Oral hypoglycemics while patient is in the hospital.        Thrombocytopenia  At baseline- continue to monitor    ESRD (end stage renal disease)  Gap is still high- need more HD sessions  C/w Bicarb drip    Seizure disorder  C/w Keppra        VTE Risk Mitigation (From admission, onward)           Ordered     IP VTE HIGH RISK PATIENT  Once         12/12/23 1728     Place sequential compression device  Until discontinued         12/12/23 1728     Reason for No Pharmacological VTE Prophylaxis  Once        Question:  Reasons:  Answer:  Already adequately anticoagulated on oral Anticoagulants    12/12/23 1728                  Critical care time spent on the evaluation and treatment of severe organ dysfunction, review of pertinent labs and imaging studies, discussions with consulting providers and discussions with patient/family: 35 minutes.             [facility] Medicine  Progress Note    Patient Name: Tabby Howard  MRN: [id number]  Patient Class: OP- Observation   Admission Date: 12/12/2023  Length of Stay: 0 days  Attending Physician: Linda Cueto MD  Primary Care Provider: Melony Kim NP        Subjective:     Principal Problem:Metabolic acidosis        HPI:  Patient is a 33-year-old female with history of ESRD on HD, suspected gastroparesis, hypertension, pericardial effusion, type 2 diabetes who was seen today with complaint of nausea and vomiting since this morning.  Also with abdominal pain.  She reports she has had similar episodes in the past.  She denies any sick contacts.  No fevers cough congestion or runny nose.  Does report some chills.  She does not make urine.  She reports last bowel movement was yesterday.  Workup in the ED significant for metabolic acidosis with anion gap of 19, potassium 5.3.  Hemoglobin is 6.2 and glucose is 275.  Beta hydroxybutyrate  is normal.  I discussed management with nephrologist Dr. Peguero who recommends dialysis today with 2 units of blood.  Orders placed.      Overview/Hospital Course:  No notes on file    Interval History:  Patient seen and examined.  Reports abdominal pain is still uncontrolled.  Still with nausea and vomiting.  LFT bump noted.  We will change diet to clears.  Consult with GI for consideration of endoscopy.  Per Nephrology will do ultrafiltration session today.    Review of Systems   Constitutional:  Positive for chills. Negative for fever.   HENT:  Negative for congestion and rhinorrhea.    Respiratory:  Negative for cough, shortness of breath and wheezing.    Cardiovascular:  Negative for chest pain, palpitations and leg swelling.   Gastrointestinal:  Positive for abdominal pain, nausea and vomiting. Negative for abdominal distention.   Endocrine: Negative for cold intolerance and heat intolerance.   Genitourinary:  Negative for dysuria and urgency.   Musculoskeletal:  Negative for arthralgias and neck stiffness.   Skin:  Negative for rash and wound.   Neurological:  Negative for dizziness and weakness.     Objective:     Vital Signs (Most Recent):  Temp: 96.3 °F (35.7 °C) (12/13/23 0818)  Pulse: 88 (12/13/23 0818)  Resp: 18 (12/13/23 0818)  BP: (!) 196/99 (12/13/23 0818)  SpO2: 97 % (12/13/23 0818) Vital Signs (24h Range):  Temp:  [96.3 °F (35.7 °C)-98.8 °F (37.1 °C)] 96.3 °F (35.7 °C)  Pulse:  [83-92] 88  Resp:  [16-22] 18  SpO2:  [77 %-100 %] 97 %  BP: ()/() 196/99     Weight: 61.2 kg (135 lb)  Body mass index is 24.69 kg/m².    Intake/Output Summary (Last 24 hours) at 12/13/2023 1051  Last data filed at 12/13/2023 0613  Gross per 24 hour   Intake 1481 ml   Output 4200 ml   Net -2719 ml         Physical Exam  Constitutional:       Appearance: She is well-developed.      Comments: Appears uncomfortable   HENT:      Head: Normocephalic and atraumatic.   Eyes:      Pupils: Pupils are equal, round, and  reactive to light.   Cardiovascular:      Rate and Rhythm: Normal rate and regular rhythm.      Heart sounds: No murmur heard.  Pulmonary:      Effort: Pulmonary effort is normal. No respiratory distress.      Breath sounds: Normal breath sounds. No wheezing or rales.   Abdominal:      General: Bowel sounds are normal. There is no distension.      Palpations: Abdomen is soft.      Tenderness: There is no abdominal tenderness.      Comments: Bile stained sheets   Musculoskeletal:         General: Normal range of motion.   Skin:     General: Skin is warm and dry.      Findings: No rash.   Neurological:      Mental Status: She is alert and oriented to person, place, and time.      Cranial Nerves: No cranial nerve deficit.   Psychiatric:         Behavior: Behavior normal.             Significant Labs: All pertinent labs within the past 24 hours have been reviewed.    Significant Imaging: I have reviewed all pertinent imaging results/findings within the past 24 hours.    Assessment/Plan:      * Metabolic acidosis  Nephrology consulted for HD      Abdominal pain  Noted. Patient with suspected gastroparesis  Given uncontrolled nausea and vomiting will consult with GI  Pain control with IV narcotic for now      Hyperkalemia  This patient has hyperkalemia which resolved with HD.. We will monitor for arrhythmias with EKG or continuous telemetry. We will treat the hyperkalemia with HD. The likely etiology of the hyperkalemia is ESRD.  The patients latest potassium has been reviewed and the results are listed below  Recent Labs   Lab 12/13/23  0427   K 4.4               Anemia in ESRD (end-stage renal disease)  Patient's anemia is currently uncontrolled. Has not received any PRBCs to date. Etiology likely d/t chronic disease due to Chronic Kidney Disease/ESRD  Current CBC reviewed-   Lab Results   Component Value Date    HGB 7.9 (L) 12/13/2023    HCT 23.6 (L) 12/13/2023     Monitor serial CBC and transfuse if patient becomes  "hemodynamically unstable, symptomatic or H/H drops below 7/21.  Transfused 2 units 12/12    Chronic congestive heart failure with left ventricular diastolic dysfunction  Patient is identified as having Diastolic (HFpEF) heart failure that is Chronic. CHF is currently controlled. Latest ECHO performed and demonstrates- Results for orders placed during the hospital encounter of 09/10/23    Echo    Interpretation Summary    Left Ventricle: The left ventricle is normal in size. Mildly increased ventricular mass. Mildly increased wall thickness. Normal wall motion. There is low normal systolic function with a visually estimated ejection fraction of 50 - 55%. There is normal diastolic function.    Right Ventricle: Normal right ventricular cavity size. Wall thickness is normal. Right ventricle wall motion  is normal. Systolic function is normal.    IVC/SVC: Normal venous pressure at 3 mmHg.    Pericardium: There is a small circumferential effusion. Pericardial effusion is echolucent. No indication of cardiac tamponade. Evidence includes no chamber collapse.  . Continue Beta Blocker and ACE/ARB and monitor clinical status closely. Monitor on telemetry. Patient is off CHF pathway.  Monitor strict Is&Os and daily weights.  Place on fluid restriction of 1.5 L. Cardiology has not been consulted. Continue to stress to patient importance of self efficacy and  on diet for CHF. Last BNP reviewed- and noted below No results for input(s): "BNP", "BNPTRIAGEBLO" in the last 168 hours.    Type 2 diabetes mellitus with hyperglycemia, with long-term current use of insulin, previous HbA1c 5.8%  Patient's FSGs are controlled on current medication regimen.  Last A1c reviewed-   Lab Results   Component Value Date    HGBA1C 5.8 08/01/2023     Most recent fingerstick glucose reviewed-   Recent Labs   Lab 12/12/23 2131   POCTGLUCOSE 132*       Current correctional scale  Medium  Maintain anti-hyperglycemic dose as follows- "   Antihyperglycemics (From admission, onward)      Start     Stop Route Frequency Ordered    12/13/23 0645  insulin aspart U-100 pen 8 Units         -- SubQ 3 times daily before meals 12/12/23 1728    12/12/23 2100  insulin detemir U-100 (Levemir) pen 21 Units         -- SubQ Nightly 12/12/23 1728    12/12/23 1728  insulin aspart U-100 pen 1-10 Units         -- SubQ Before meals & nightly PRN 12/12/23 1728          Hold Oral hypoglycemics while patient is in the hospital.        ESRD (end stage renal disease)  Nephrology consulted for HD      VTE Risk Mitigation (From admission, onward)           Ordered     IP VTE HIGH RISK PATIENT  Once         12/12/23 1728     Place sequential compression device  Until discontinued         12/12/23 1728     Reason for No Pharmacological VTE Prophylaxis  Once        Question:  Reasons:  Answer:  Already adequately anticoagulated on oral Anticoagulants    12/12/23 1728                    Discharge Planning   TATIANA:      Code Status: Full Code   Is the patient medically ready for discharge?:     Reason for patient still in hospital (select all that apply): Patient trending condition, Treatment, and Consult recommendations  Discharge Plan A: Home with family                  Linda Cueto MD  Department of Hospital Medicine   [facility]         David Awan MD  Department of Hospital Medicine  [facility]

## 2023-12-14 NOTE — CONSULTS
Nephrology Consult Note        Patient Name: Tabby Howard  MRN: [id number]    Patient Class: IP- Inpatient   Admission Date: 2023  Length of Stay: 1 days  Date of Service: 2023    Attending Physician: Jason Peres MD  Primary Care Provider: Melony Kim NP    Reason for Consult: esrd    SUBJECTIVE:     HPI: 33F ESRD on HD TTS by Dr. Figueroa, known gastroparesis and chronic pain, sickle cell trait, hypertension, pericardial effusion, type 2 diabetes admitted at [facility] for n/v and was found to be in acidosis, anemic and hyperkalemic- HD was done and 2 units were tranfused. Her abdominal pain got worse, LFTs went up and she had RRT called for lethargy, low BP, low BG. Due to concern of cholangitis, she was transferred to [facility] on  for possible MRCP. Will need HD today or tomorrow.    Past Medical History:   Diagnosis Date    ESRD on hemodialysis 2022    Gastritis 2022    EGD was 22    Gastroparesis 2022    has not had Emptying study    Heart failure with preserved ejection fraction 2022    EF 55% on 3/22    History of supraventricular tachycardia     Hyperkalemia 2022    Hypertensive emergency 2022    Sickle cell trait 2022    Type 2 diabetes mellitus      Past Surgical History:   Procedure Laterality Date     SECTION      x 3    COLONOSCOPY      COLONOSCOPY N/A 2022    Procedure: COLONOSCOPY;  Surgeon: Jagdeep Cedeno MD;  Location: [facility];  Service: Endoscopy;  Laterality: N/A;    ESOPHAGOGASTRODUODENOSCOPY N/A 10/18/2019    Procedure: ESOPHAGOGASTRODUODENOSCOPY (EGD);  Surgeon: Gianluca Mendez MD;  [facility];  Service: Endoscopy;  Laterality: N/A;    ESOPHAGOGASTRODUODENOSCOPY N/A 2022    Procedure: EGD (ESOPHAGOGASTRODUODENOSCOPY);  Surgeon: Micky Paredes III, MD;  Location: [facility];  Service: Endoscopy;  Laterality: N/A;    ESOPHAGOGASTRODUODENOSCOPY N/A 2022    Procedure: EGD  (ESOPHAGOGASTRODUODENOSCOPY);  Surgeon: Marcelo Zhong MD;  Location: [facility] ENDO;  Service: Endoscopy;  Laterality: N/A;    INSERTION, CATHETER, TUNNELED N/A 6/17/2023    Procedure: Insertion,catheter,tunneled;  Surgeon: Carlos Thurman Jr., MD;  Location: [facility] OR;  Service: General;  Laterality: N/A;    LAPAROSCOPIC CHOLECYSTECTOMY N/A 07/30/2022    Procedure: CHOLECYSTECTOMY, LAPAROSCOPIC;  Surgeon: Grey Perez MD;  Location: [facility] OR;  Service: General;  Laterality: N/A;    PLACEMENT OF DUAL-LUMEN VASCULAR CATHETER Left 07/12/2022    Procedure: INSERTION-CATHETER-JOSEPH;  Surgeon: Dionte Gan MD;  Location: [facility] OR;  Service: General;  Laterality: Left;    PLACEMENT OF DUAL-LUMEN VASCULAR CATHETER Right 07/26/2022    Procedure: INSERTION-CATHETER-Hemosplit;  Surgeon: Dionte Gan MD;  Location: [facility] OR;  Service: General;  Laterality: Right;     Family History   Problem Relation Age of Onset    Diabetes Mother     Diabetes Father      Social History     Tobacco Use    Smoking status: Never    Smokeless tobacco: Never   Substance Use Topics    Alcohol use: Not Currently    Drug use: No       Review of patient's allergies indicates:   Allergen Reactions    Penicillins Hives       Outpatient meds:  No current facility-administered medications on file prior to encounter.     Current Outpatient Medications on File Prior to Encounter   Medication Sig Dispense Refill    amLODIPine (NORVASC) 5 MG tablet Take 1 tablet (5 mg total) by mouth once daily. (Patient taking differently: Take 5 mg by mouth once daily.) 30 tablet 1    apixaban (ELIQUIS) 5 mg Tab Take 1 tablet (5 mg total) by mouth 2 (two) times daily. 180 tablet 1    carvediloL (COREG) 25 MG tablet Take 1 tablet (25 mg total) by mouth 2 (two) times daily. 60 tablet 5    cetirizine (ZYRTEC) 10 MG tablet Take 1 tablet (10 mg total) by mouth once daily. 30 tablet 0    doxycycline (VIBRAMYCIN) 100 MG Cap Take 1 capsule twice a day by oral route for  7 days, for infectoin. (Patient taking differently: Take 100 mg by mouth every 12 (twelve) hours.) 14 capsule 0    FLUoxetine 20 MG capsule Take 1 capsule (20 mg total) by mouth once daily. 30 capsule 5    fluticasone propionate (FLONASE ALLERGY RELIEF) 50 mcg/actuation nasal spray [place] 1 spray every day by intranasal route. (Patient taking differently: 1 spray by Each Nostril route Daily.) 16 g 2    gabapentin (NEURONTIN) 300 MG capsule Take 2 capsules twice a day by oral route for 90 days. (Patient taking differently: Take 600 mg by mouth 2 (two) times daily.) 360 capsule 1    hydrALAZINE (APRESOLINE) 100 MG tablet Take 1 tablet (100 mg total) by mouth 2 (two) times daily. 180 tablet 1    insulin aspart U-100 (NOVOLOG FLEXPEN U-100 INSULIN) 100 unit/mL (3 mL) InPn pen Inject 8 units 3 times a day by subcutaneous route before meals (Patient taking differently: Inject 8 Units into the skin 3 (three) times daily before meals.) 24 mL 1    insulin detemir U-100, Levemir, (LEVEMIR FLEXTOUCH U100 INSULIN) 100 unit/mL (3 mL) InPn pen Inject 21 units every day by subcutaneous route in the evening for 90 days. (Patient taking differently: Inject 21 Units into the skin every evening.) 15 mL 1    isosorbide mononitrate (IMDUR) 30 MG 24 hr tablet Take 1 tablet (30 mg total) by mouth once daily. 90 tablet 1    levETIRAcetam (KEPPRA) 500 MG Tab Take 1 tablet twice a day by oral route for 30 days. (Patient taking differently: Take 500 mg by mouth 2 (two) times daily.) 60 tablet 2    ondansetron (ZOFRAN-ODT) 4 MG TbDL Place 1 tablet (4 mg total) under the tongue every 8 (eight) hours. 24 tablet 2    oxyCODONE-acetaminophen (PERCOCET) 5-325 mg per tablet TAKE 1 TABLET EVERY 6 HOURS AS NEEDED FOP PAIN (Patient taking differently: Take 1 tablet by mouth every 6 (six) hours as needed for Pain.) 15 tablet 0    pantoprazole (PROTONIX) 40 MG tablet Take 1 tablet (40 mg total) by mouth once daily. 90 tablet 1    promethazine (PHENERGAN)  25 MG tablet Take 1 tablet (25 mg total) by mouth every 6 (six) hours as needed for 5 days. (Patient taking differently: Take 25 mg by mouth every 6 (six) hours as needed for Nausea.) 20 tablet 2    sevelamer carbonate (RENVELA) 800 mg Tab Take 2 tablets with meals three times a day (Patient taking differently: Take 1,600 mg by mouth 3 (three) times daily with meals.) 180 tablet 11    sucralfate (CARAFATE) 100 mg/mL suspension Take 10 mL 4 times a day by oral route before meals for 30 days. (Patient taking differently: Take 1 g by mouth 4 (four) times daily with meals and nightly.) 1200 mL 1    amLODIPine (NORVASC) 5 MG tablet Take 1 tablet every day by oral route for 90 days. 90 tablet 1    blood sugar diagnostic (TRUE METRIX GLUCOSE TEST STRIP) Strp Use 1 strip to check blood glucose three times daily 100 each 11    blood-glucose meter (TRUE METRIX GLUCOSE METER) Misc Use to check blood glucose 1 each 0    brimonidine 0.2% (ALPHAGAN) 0.2 % Drop Place 1 drop in right eye twice daily. 10 mL 5    carvediloL (COREG) 25 MG tablet Take 1 tablet (25 mg total) by mouth 2 (two) times daily. 60 tablet 2    ciprofloxacin HCl (CILOXAN) 0.3 % ophthalmic solution instill 1 drop into the right eye 4 times a day for 30 days 5 mL 0    FLUoxetine 20 MG capsule Take 1 capsule (20 mg total) by mouth once daily. 90 capsule 1    gabapentin (NEURONTIN) 300 MG capsule Take 2 capsules (600 mg total) by mouth 2 (two) times daily. 360 capsule 1    insulin aspart U-100 (NOVOLOG FLEXPEN U-100 INSULIN) 100 unit/mL (3 mL) InPn pen Inject 10 units 3 times a day by subcutaneous route before meals for 90 days. 27 mL 2    insulin detemir U-100, Levemir, (LEVEMIR FLEXTOUCH U100 INSULIN) 100 unit/mL (3 mL) InPn pen Inject 18 units every day by subcutaneous route in the evening 18 mL 1    insulin detemir U-100, Levemir, (LEVEMIR FLEXTOUCH U100 INSULIN) 100 unit/mL (3 mL) InPn pen Inject 22 units every day by subcutaneous route in the evening for 90  "days. 18 mL 1    isosorbide mononitrate (IMDUR) 30 MG 24 hr tablet Take 1 tablet (30 mg total) by mouth once daily. 30 tablet 1    ketorolac 0.5% (ACULAR) 0.5 % Drop Instill 1 drop into the right eye 4 times a day for 30 days 3 mL 5    lancets (TRUEPLUS LANCETS) 33 gauge Misc Use 1 to check blood glucose three times daily 100 each 11    lancets 33 gauge Misc Use to test twice daily 100 each 5    levETIRAcetam (KEPPRA) 500 MG Tab Take 1 tablet (500 mg total) by mouth 2 (two) times daily. 60 tablet 2    ondansetron (ZOFRAN-ODT) 4 MG TbDL Dissolve 1 tablet (4 mg total) by mouth every 8 (eight) hours. 24 tablet 2    pen needle, diabetic 31 gauge x 3/16" Ndle Use as directed to inject insulin 5 times daily 100 each 11    prednisoLONE acetate (PRED FORTE) 1 % DrpS Instill 1 drops into the right eye 4 times a day for 30 days 5 mL 1    promethazine (PHENERGAN) 25 MG suppository Place 1 suppository (25 mg total) rectally every 6 (six) hours as needed for Nausea. 10 suppository 0    sucroferric oxyhydroxide (VELPHORO) 500 mg Chew Take 1 tablet 3 times a day (Patient taking differently: Take 1 tablet by mouth 3 (three) times daily.) 90 tablet 6    timolol maleate 0.5% (TIMOPTIC) 0.5 % Drop Instill 1 drop into the right eye 2 times a day for 30 days 5 mL 6    [DISCONTINUED] atenoloL (TENORMIN) 50 MG tablet Take 1 tablet (50 mg total) by mouth every other day. 45 tablet 3    [DISCONTINUED] omeprazole (PRILOSEC) 20 MG capsule Take 2 capsules (40 mg total) by mouth once daily. for 10 days 20 capsule 0       Scheduled meds:   amLODIPine  5 mg Oral Daily    carvediloL  25 mg Oral BID    epoetin teresa-epbx  100 Units/kg Subcutaneous Every Tues, Thurs, Sat    FLUoxetine  20 mg Oral Daily    gabapentin  600 mg Oral BID    hydrALAZINE  100 mg Oral BID    insulin aspart U-100  8 Units Subcutaneous TID AC    insulin detemir U-100 (Levemir)  21 Units Subcutaneous QHS    isosorbide mononitrate  30 mg Oral Daily    levETIRAcetam  500 mg Oral " BID    meropenem (MERREM) IVPB  500 mg Intravenous Q24H    mupirocin   Nasal BID    pantoprazole  40 mg Intravenous BID AC    polyethylene glycol  17 g Oral Daily       Infusions:   NORepinephrine bitartrate-D5W Stopped (12/13/23 1815)       PRN meds:  0.9%  NaCl infusion (for blood administration), 0.9%  NaCl infusion (for blood administration), acetaminophen, albuterol-ipratropium, aluminum-magnesium hydroxide-simethicone, dextrose 10%, dextrose 10%, dextrose 50%, dextrose 50%, glucagon (human recombinant), glucose, glucose, insulin aspart U-100, melatonin, morphine, naloxone, ondansetron, prochlorperazine, simethicone, sodium chloride 0.9%    Review of Systems:  Constitutional:  Negative for chills, fever, malaise/fatigue and weight loss.   HENT:  Negative for hearing loss and nosebleeds.    Eyes:  Negative for blurred vision, double vision and photophobia.   Respiratory:  Negative for cough, shortness of breath and wheezing.    Cardiovascular:  Negative for chest pain, palpitations and leg swelling.   Gastrointestinal:  Negative for abdominal pain, constipation, diarrhea, heartburn, nausea and vomiting.   Genitourinary:  Negative for dysuria, frequency and urgency.   Musculoskeletal:  Negative for falls, joint pain and myalgias.   Skin:  Negative for itching and rash.   Neurological:  Negative for dizziness, speech change, focal weakness, loss of consciousness and headaches.   Endo/Heme/Allergies:  Does not bruise/bleed easily.   Psychiatric/Behavioral:  Negative for depression and substance abuse. The patient is not nervous/anxious.      OBJECTIVE:     Vital Signs and IO:  Temp:  [97.6 °F (36.4 °C)-99.5 °F (37.5 °C)]   Pulse:  [68-82]   Resp:  [11-27]   BP: ()/(61-87)   SpO2:  [93 %-100 %]   I/O last 3 completed shifts:  In: 2270.4 [P.O.:340; I.V.:1035; Blood:296; Other:500; IV Piggyback:99.4]  Out: 4200 [Other:4200]  Wt Readings from Last 5 Encounters:   12/13/23 59.5 kg (131 lb 2.8 oz)   10/18/23 61.8  kg (136 lb 3.9 oz)   10/13/23 59.4 kg (130 lb 15.3 oz)   10/03/23 60.3 kg (132 lb 15 oz)   09/19/23 48.9 kg (107 lb 12.9 oz)     Body mass index is 23.99 kg/m².    Physical Exam  Constitutional:       General: Patient is not in acute distress.     Appearance: Patient is well-developed. She is not diaphoretic.   HENT:      Head: Normocephalic and atraumatic.      Mouth/Throat: Mucous membranes are moist.   Eyes:      General: No scleral icterus.     Pupils: Pupils are equal, round, and reactive to light.   Cardiovascular:      Rate and Rhythm: Normal rate and regular rhythm.   Pulmonary:      Effort: Pulmonary effort is normal. No respiratory distress.      Breath sounds: No stridor.   Abdominal:      General: There is no distension.      Palpations: Abdomen is soft.   Musculoskeletal:         General: No deformity. Normal range of motion.      Cervical back: Neck supple.   Skin:     General: Skin is warm and dry.      Findings: No rash present. No erythema.   Neurological:      Mental Status: Patient is alert and oriented to person, place, and time.      Cranial Nerves: No cranial nerve deficit.   Psychiatric:         Behavior: Behavior normal.     Laboratory:  Recent Labs   Lab 12/13/23 0427 12/13/23  1557 12/14/23  0409    142 142   K 4.4 5.4* 5.2*    104 103   CO2 20* 14* 25   BUN 32* 52* 62*   CREATININE 6.4* 7.4* 7.8*   * 194* 130*       Recent Labs   Lab 12/13/23 0427 12/13/23  1557 12/14/23  0409   CALCIUM 9.3 8.4* 7.9*   ALBUMIN 3.7 3.0* 3.8   PHOS 5.5*  --  7.9*   MG 2.2  --  2.4       Recent Labs   Lab 07/08/22  0516   PTH, Intact 289.8 H       Recent Labs   Lab 12/12/23  0906 12/12/23  2131 12/13/23  0742 12/13/23  1129 12/13/23  1545 12/13/23  1601 12/13/23  1620 12/13/23  1726   POCTGLUCOSE 307* 132* 176* 165* 34* 194* 183* 167*       Recent Labs   Lab 12/23/22  1413 03/03/23  0547 08/01/23  0813   Hemoglobin A1C 7.5 H 5.8 5.8       Recent Labs   Lab 12/13/23  0427 12/13/23  1557  12/14/23  0513   WBC 13.78* 12.77* 14.07*   HGB 7.9* 6.7* 8.0*   HCT 23.6* 19.9* 22.9*   PLT 89* 95* 90*   MCV 86 86 85   MCHC 33.5 33.7 34.9   MONO 5.2  0.7 7.4  1.0 8.4  1.2*   EOSINOPHIL 0.7 0.1 1.5       Recent Labs   Lab 12/13/23  0427 12/13/23  1557 12/14/23  0409   BILITOT 3.3* 4.0* 1.4*   PROT 7.8 6.5 6.8   ALBUMIN 3.7 3.0* 3.8   ALKPHOS 240* 206* 198*   * 137* 104*   * 146* 103*       Recent Labs   Lab 03/24/21  1826 03/25/21  1910 03/16/22  1848 05/15/22  2022 02/05/23  0156 09/19/23  1422 10/13/23  1001   Color, UA Yellow   < > Pale Yellow   < > Yellow Yellow Yellow   Appearance, UA  --    < >  --    < > Clear Clear Clear   pH, UA  --    < >  --    < > >8.0 A >8.0 A 8.0   Specific [place], UA  --    < >  --    < > 1.020 1.020 1.015   Protein, UA  --    < >  --    < > 3+ A 4+ 4+   Glucose, UA >=500 mg/dL A   < > 50 mg/dL A   < > 2+ A 4+ A 3+ A   Ketones, UA  --    < >  --    < > 1+ A Trace A Trace A   Urobilinogen, UA Normal   < > Normal   < > Negative Negative Negative   Bilirubin (UA) Negative   < > Negative   < > 2+ A Negative 1+ A   Occult Blood UA  --    < >  --    < > 3+ A 2+ A Negative   Blood, UA >1.0 mg/dL A   < > 0.03 mg/dL A   < >  --   --   --    Nitrite, UA  --    < >  --    < > Negative Negative Negative   RBC, UA  --    < >  --    < > 15 H 17 H 8 H   WBC, UA  --    < >  --    < > 1 2 5   Bacteria  --    < >  --    < > Rare Negative Negative   Mucus, UA Rare A  --  Rare A  --   --   --   --    Hyaline Casts, UA  --    < >  --    < > 2 A 1 2 A    < > = values in this interval not displayed.       Recent Labs   Lab 10/03/23  2105 10/04/23  0438 10/15/23  0353   POC PH 7.290 LL 7.305 L 7.239 LL   POC PCO2 45.2 H 43.6 37.8   POC HCO3 21.7 L 21.7 L 16.1 L   POC PO2 110 H 92 37 LL   POC SATURATED O2 98 96 60   POC BE -5 -5 -11   Sample ARTERIAL ARTERIAL VENOUS       Microbiology Results (last 7 days)       ** No results found for the last 168 hours. **            ASSESSMENT/PLAN:      ESRD on HD TTS via AVF  Next HD per schedule.  Continue current dialysis prescription.  Renal diet - low K, low phos.  No IVs or BP checks on access and/or non-dominant arm.    Anemia of CKD  Hgb and HCT are acceptable. Monitor for now.  Will provide SHELLEY and/or IV iron PRN.  s/p blood transfusion.    MBD / Secondary HPT  Ca, Phos, PTH and vitamin D levels are acceptable.   Phos binders, vitamin D and analogues, calcimimetics will be given as needed.    HTN  BP seem controlled.   Tolerate asymptomatic HTN up to -160.  Continue home meds.  Low sodium diet.    Chronic abdominal pain  Possible cholangitis  Management per GI and primary team.    Thank you for allowing us to participate in the care of your patient!   We will follow the patient and provide recommendations as needed.    Patient care time was spent personally by me on the following activities:     Obtaining a history.  Examination of patient.  Providing medical care at the patients bedside.  Developing a treatment plan with patient or surrogate and bedside caregivers.  Ordering and reviewing laboratory studies, radiographic studies, pulse oximetry.  Ordering and performing treatments and interventions.  Evaluation of patient's response to treatment.  Discussions with consultants while on the unit and immediately available to the patient.  Re-evaluation of the patient's condition.  Documentation in the medical record.     Mando Benitez MD    [facility]  [address] [place] [address]  JEANMARIE [place] [address]    [phone] - tel  [phone] - fax    12/14/2023

## 2023-12-14 NOTE — PROGRESS NOTES
12/14/23 1430   Vital Signs   Temp 98.4 °F (36.9 °C)   Pulse 80   Resp 19   SpO2 99 %   BP (!) 152/88   MAP (mmHg) 115   Post-Hemodialysis Assessment   Rinseback Volume (mL) 250 mL   Blood Volume Processed (Liters) 58.5 L   Dialyzer Clearance Lightly streaked   Duration of Treatment 180 minutes   Total UF (mL) 4500 mL   Net Fluid Removal 4000   Patient Response to Treatment tolerated well   Post-Treatment Weight 56.1 kg (123 lb 10.9 oz)   Treatment Weight Change -3.9   Post-Hemodialysis Comments no problems

## 2023-12-15 ENCOUNTER — TELEPHONE (OUTPATIENT)
Dept: HEMATOLOGY/ONCOLOGY | Facility: CLINIC | Age: 33
End: 2023-12-15
Payer: MEDICAID

## 2023-12-15 PROBLEM — R45.1 AGITATION: Status: ACTIVE | Noted: 2023-12-15

## 2023-12-15 LAB
ALBUMIN SERPL BCP-MCNC: 4 G/DL (ref 3.5–5.2)
ALP SERPL-CCNC: 202 U/L (ref 55–135)
ALT SERPL W/O P-5'-P-CCNC: 83 U/L (ref 10–44)
ANION GAP SERPL CALC-SCNC: 20 MMOL/L (ref 8–16)
ANISOCYTOSIS BLD QL SMEAR: SLIGHT
AST SERPL-CCNC: 48 U/L (ref 10–40)
BASOPHILS # BLD AUTO: 0.08 K/UL (ref 0–0.2)
BASOPHILS NFR BLD: 0.7 % (ref 0–1.9)
BILIRUB SERPL-MCNC: 1.7 MG/DL (ref 0.1–1)
BUN SERPL-MCNC: 35 MG/DL (ref 6–20)
CALCIUM SERPL-MCNC: 9.1 MG/DL (ref 8.7–10.5)
CHLORIDE SERPL-SCNC: 100 MMOL/L (ref 95–110)
CO2 SERPL-SCNC: 19 MMOL/L (ref 23–29)
CREAT SERPL-MCNC: 6 MG/DL (ref 0.5–1.4)
DIFFERENTIAL METHOD: ABNORMAL
EOSINOPHIL # BLD AUTO: 0.4 K/UL (ref 0–0.5)
EOSINOPHIL NFR BLD: 3.1 % (ref 0–8)
ERYTHROCYTE [DISTWIDTH] IN BLOOD BY AUTOMATED COUNT: 21.5 % (ref 11.5–14.5)
EST. GFR  (NO RACE VARIABLE): 8.8 ML/MIN/1.73 M^2
GLUCOSE SERPL-MCNC: 113 MG/DL (ref 70–110)
GLUCOSE SERPL-MCNC: 127 MG/DL (ref 70–110)
GLUCOSE SERPL-MCNC: 149 MG/DL (ref 70–110)
HCT VFR BLD AUTO: 25.6 % (ref 37–48.5)
HGB BLD-MCNC: 8.7 G/DL (ref 12–16)
IMM GRANULOCYTES # BLD AUTO: 0.18 K/UL (ref 0–0.04)
IMM GRANULOCYTES NFR BLD AUTO: 1.5 % (ref 0–0.5)
LYMPHOCYTES # BLD AUTO: 1.7 K/UL (ref 1–4.8)
LYMPHOCYTES NFR BLD: 14.4 % (ref 18–48)
MAGNESIUM SERPL-MCNC: 2.2 MG/DL (ref 1.6–2.6)
MCH RBC QN AUTO: 29.8 PG (ref 27–31)
MCHC RBC AUTO-ENTMCNC: 34 G/DL (ref 32–36)
MCV RBC AUTO: 88 FL (ref 82–98)
MONOCYTES # BLD AUTO: 1.2 K/UL (ref 0.3–1)
MONOCYTES NFR BLD: 10.2 % (ref 4–15)
NEUTROPHILS # BLD AUTO: 8.2 K/UL (ref 1.8–7.7)
NEUTROPHILS NFR BLD: 70.1 % (ref 38–73)
NRBC BLD-RTO: 19 /100 WBC
PHOSPHATE SERPL-MCNC: 5.4 MG/DL (ref 2.7–4.5)
PLATELET # BLD AUTO: 85 K/UL (ref 150–450)
PLATELET BLD QL SMEAR: ABNORMAL
PMV BLD AUTO: 9.9 FL (ref 9.2–12.9)
POTASSIUM SERPL-SCNC: 4.1 MMOL/L (ref 3.5–5.1)
PROT SERPL-MCNC: 7.4 G/DL (ref 6–8.4)
RBC # BLD AUTO: 2.92 M/UL (ref 4–5.4)
SODIUM SERPL-SCNC: 139 MMOL/L (ref 136–145)
WBC # BLD AUTO: 11.75 K/UL (ref 3.9–12.7)

## 2023-12-15 PROCEDURE — 84100 ASSAY OF PHOSPHORUS: CPT | Performed by: HOSPITALIST

## 2023-12-15 PROCEDURE — 94760 N-INVAS EAR/PLS OXIMETRY 1: CPT

## 2023-12-15 PROCEDURE — 85025 COMPLETE CBC W/AUTO DIFF WBC: CPT | Performed by: HOSPITALIST

## 2023-12-15 PROCEDURE — 12000002 HC ACUTE/MED SURGE SEMI-PRIVATE ROOM

## 2023-12-15 PROCEDURE — 99900035 HC TECH TIME PER 15 MIN (STAT)

## 2023-12-15 PROCEDURE — 83735 ASSAY OF MAGNESIUM: CPT | Performed by: HOSPITALIST

## 2023-12-15 PROCEDURE — 25000003 PHARM REV CODE 250: Performed by: HOSPITALIST

## 2023-12-15 PROCEDURE — 80053 COMPREHEN METABOLIC PANEL: CPT | Performed by: HOSPITALIST

## 2023-12-15 PROCEDURE — 99222 PR INITIAL HOSPITAL CARE,LEVL II: ICD-10-PCS | Mod: ,,, | Performed by: INTERNAL MEDICINE

## 2023-12-15 PROCEDURE — 99222 1ST HOSP IP/OBS MODERATE 55: CPT | Mod: ,,, | Performed by: INTERNAL MEDICINE

## 2023-12-15 PROCEDURE — 63600175 PHARM REV CODE 636 W HCPCS: Performed by: STUDENT IN AN ORGANIZED HEALTH CARE EDUCATION/TRAINING PROGRAM

## 2023-12-15 PROCEDURE — 94799 UNLISTED PULMONARY SVC/PX: CPT

## 2023-12-15 PROCEDURE — 94761 N-INVAS EAR/PLS OXIMETRY MLT: CPT

## 2023-12-15 PROCEDURE — 36415 COLL VENOUS BLD VENIPUNCTURE: CPT | Performed by: HOSPITALIST

## 2023-12-15 PROCEDURE — 99900031 HC PATIENT EDUCATION (STAT)

## 2023-12-15 PROCEDURE — 63600175 PHARM REV CODE 636 W HCPCS: Performed by: HOSPITALIST

## 2023-12-15 RX ORDER — MORPHINE SULFATE 2 MG/ML
2 INJECTION, SOLUTION INTRAMUSCULAR; INTRAVENOUS EVERY 4 HOURS PRN
Status: DISCONTINUED | OUTPATIENT
Start: 2023-12-15 | End: 2023-12-17 | Stop reason: HOSPADM

## 2023-12-15 RX ORDER — HYDROMORPHONE HYDROCHLORIDE 1 MG/ML
0.5 INJECTION, SOLUTION INTRAMUSCULAR; INTRAVENOUS; SUBCUTANEOUS ONCE
Status: COMPLETED | OUTPATIENT
Start: 2023-12-15 | End: 2023-12-15

## 2023-12-15 RX ORDER — HALOPERIDOL 5 MG/ML
5 INJECTION INTRAMUSCULAR EVERY 6 HOURS PRN
Status: DISCONTINUED | OUTPATIENT
Start: 2023-12-15 | End: 2023-12-17 | Stop reason: HOSPADM

## 2023-12-15 RX ORDER — LORAZEPAM 2 MG/ML
0.5 INJECTION INTRAMUSCULAR ONCE
Status: DISCONTINUED | OUTPATIENT
Start: 2023-12-15 | End: 2023-12-15

## 2023-12-15 RX ORDER — MORPHINE SULFATE 2 MG/ML
2 INJECTION, SOLUTION INTRAMUSCULAR; INTRAVENOUS ONCE
Status: COMPLETED | OUTPATIENT
Start: 2023-12-15 | End: 2023-12-15

## 2023-12-15 RX ADMIN — ONDANSETRON 8 MG: 4 TABLET, ORALLY DISINTEGRATING ORAL at 08:12

## 2023-12-15 RX ADMIN — LEVETIRACETAM 500 MG: 500 TABLET, FILM COATED ORAL at 09:12

## 2023-12-15 RX ADMIN — GABAPENTIN 600 MG: 300 CAPSULE ORAL at 09:12

## 2023-12-15 RX ADMIN — INSULIN DETEMIR 21 UNITS: 100 INJECTION, SOLUTION SUBCUTANEOUS at 09:12

## 2023-12-15 RX ADMIN — ONDANSETRON 8 MG: 4 TABLET, ORALLY DISINTEGRATING ORAL at 07:12

## 2023-12-15 RX ADMIN — MORPHINE SULFATE 2 MG: 2 INJECTION, SOLUTION INTRAMUSCULAR; INTRAVENOUS at 09:12

## 2023-12-15 RX ADMIN — CARVEDILOL 25 MG: 25 TABLET, FILM COATED ORAL at 09:12

## 2023-12-15 RX ADMIN — MORPHINE SULFATE 2 MG: 2 INJECTION, SOLUTION INTRAMUSCULAR; INTRAVENOUS at 08:12

## 2023-12-15 RX ADMIN — MORPHINE SULFATE 1 MG: 4 INJECTION, SOLUTION INTRAMUSCULAR; INTRAVENOUS at 06:12

## 2023-12-15 RX ADMIN — HYDROMORPHONE HYDROCHLORIDE 0.5 MG: 0.5 INJECTION, SOLUTION INTRAMUSCULAR; INTRAVENOUS; SUBCUTANEOUS at 10:12

## 2023-12-15 NOTE — ASSESSMENT & PLAN NOTE
This patient has hyperkalemia which improved with HD.. We will monitor for arrhythmias with EKG or continuous telemetry. We will treat the hyperkalemia with HD. The likely etiology of the hyperkalemia is ESRD.  The patients latest potassium has been reviewed and the results are listed below  Recent Labs   Lab 12/14/23  0409   K 5.2*

## 2023-12-15 NOTE — PROGRESS NOTES
[facility]  Progress Note    Patient Name: Tabby Howard  MRN: [id number]  Patient Class: IP- Inpatient   Admission Date: 12/12/2023  Length of Stay: 2 days  Attending Physician: Donavon Bailey MD  Primary Care Provider: Melony Kim NP        Subjective:     Principal Problem:Abdominal pain        HPI:  Ms. Howard is a 33-year-old female with history of ESRD on HD, suspected gastroparesis, sickle cell trait, hypertension, pericardial effusion, type 2 diabetes who was admitted at [facility] for n/v and was found to be in acidosis, anemic and hyperkalemic- Hd was done yesterday and 2 units were tranfused. Today at the other [place] other abdominal pain got worse, LFTs went up and she also had RRT for lethargy, low BP, low BG and for the concern of cholangitis she was transferred here. GI consult was also done there and pending MRCP.       Pt was seen in ICU and was able to give some hx, was telling her abdominal pain is 10/10, sharp happening at r side and its different from when she had gall stones removed in the past. BP stable, pulse regular, Hb is still low- transfusing one more unit and sending for MRCP.             Overview/Hospital Course:  Patient with history of ESRD, suspected gastroparesis, sickle cell trait, hypertension, type 2 diabetes admitted for nausea and vomiting, found to be acidotic, anemic, hyperkalemic.  Received dialysis with improvement in electrolytes.  Noted to have worsening abdominal pain, LFTs elevated.  Rapid response was called for low blood pressure and low glucose, there was concern for cholangitis the patient was transferred to [facility].  GI was consulted recommended MRCP.  Patient did not have MRCP done given panic attack.  Patient uncooperative, reports significant abdominal discomfort although will not allow any imaging done.  Discussed with Gastroenterology will plan on EGD with EUS on 12/16/2023.  Nephrology consulted for HD management.   Hematology consulted given concern of sickle cell crisis, per hematology patient has sickle cell trait so unlikely to have crisis at this time.    Interval History:  Visited the patient multiple times this morning for severe abdominal pain, patient uncooperative on examination, will not say exactly where the abdominal pain is at, not answering any questions continues to moan in pain.  Unable to have MRCP done.  Discussed with Gastroenterology, we will plan on EUS tomorrow.  Patient has history of multiple hospitalizations with similar presentation, noncompliance.  Hemoglobin stable.  LFTs improving.  Patient found to be sticking her fingers down her throat appearing to make herself vomit.    Review of Systems   Cardiovascular:  Negative for chest pain and palpitations.   Gastrointestinal:  Positive for abdominal pain. Negative for nausea.     Objective:     Vital Signs (Most Recent):  Temp: 97.6 °F (36.4 °C) (12/15/23 0833)  Pulse: 85 (12/15/23 0833)  Resp: 20 (12/15/23 1003)  BP: (!) 143/84 (12/15/23 0957)  SpO2: 100 % (12/15/23 0833) Vital Signs (24h Range):  Temp:  [97.6 °F (36.4 °C)-99.3 °F (37.4 °C)] 97.6 °F (36.4 °C)  Pulse:  [81-89] 85  Resp:  [12-25] 20  SpO2:  [95 %-100 %] 100 %  BP: (118-174)/() 143/84     Weight: 51.9 kg (114 lb 6.7 oz)  Body mass index is 20.93 kg/m².    Intake/Output Summary (Last 24 hours) at 12/15/2023 1545  Last data filed at 12/15/2023 1344  Gross per 24 hour   Intake 0 ml   Output 0 ml   Net 0 ml         Physical Exam  Constitutional:       General: She is in acute distress.   Cardiovascular:      Rate and Rhythm: Normal rate and regular rhythm.   Pulmonary:      Effort: No respiratory distress.      Breath sounds: No wheezing.   Abdominal:      General: Abdomen is flat.      Palpations: Abdomen is soft.      Tenderness: There is abdominal tenderness.   Psychiatric:      Comments: Abnormal behavior, uncooperative.             Significant Labs: All pertinent labs within the  past 24 hours have been reviewed.  CBC:   Recent Labs   Lab 12/13/23  1557 12/14/23  0513 12/15/23  0543   WBC 12.77* 14.07* 11.75   HGB 6.7* 8.0* 8.7*   HCT 19.9* 22.9* 25.6*   PLT 95* 90* 85*     CMP:   Recent Labs   Lab 12/13/23  1557 12/14/23  0409 12/15/23  0543    142 139   K 5.4* 5.2* 4.1    103 100   CO2 14* 25 19*   * 130* 113*   BUN 52* 62* 35*   CREATININE 7.4* 7.8* 6.0*   CALCIUM 8.4* 7.9* 9.1   PROT 6.5 6.8 7.4   ALBUMIN 3.0* 3.8 4.0   BILITOT 4.0* 1.4* 1.7*   ALKPHOS 206* 198* 202*   * 103* 48*   * 104* 83*   ANIONGAP 24* 14 20*       Significant Imaging: I have reviewed all pertinent imaging results/findings within the past 24 hours.    Assessment/Plan:      * Abdominal pain  Noted. Patient with suspected gastroparesis vs cholangitis?  Significant lower abdominal discomfort.  Patient has history of multiple admissions, epigastric pain requiring pain meds.  Per reports abdominal imaging in the past have been negative.  GI consult done- possible cholangitis- no fever though. will plan on EUS on 12/16/2023 given patient unable to do MRCP because of panic attack.  NPO at midnight  Continue IVAB empirically. Dc if EUS negative  F/u lactic   Pain med prn-will start with lower dose since pt has history of decompensating       Agitation    Psych consulted  Haldol prn     Hypertension  Hypertension Medications               amLODIPine (NORVASC) 5 MG tablet Take 1 tablet (5 mg total) by mouth once daily.    carvediloL (COREG) 25 MG tablet Take 1 tablet (25 mg total) by mouth 2 (two) times daily.    hydrALAZINE (APRESOLINE) 100 MG tablet Take 1 tablet (100 mg total) by mouth 2 (two) times daily.    isosorbide mononitrate (IMDUR) 30 MG 24 hr tablet Take 1 tablet (30 mg total) by mouth once daily.          Continue her usual BP meds which are listed above.  Monitor pressures.  Controlled at present.      Hyperkalemia  This patient has hyperkalemia which improved with HD.. We will  monitor for arrhythmias with EKG or continuous telemetry. We will treat the hyperkalemia with HD. The likely etiology of the hyperkalemia is ESRD.  The patients latest potassium has been reviewed and the results are listed below  Recent Labs   Lab 12/15/23  0543   K 4.1               Anemia in ESRD (end-stage renal disease)  Patient's anemia is currently controlled. Has received 3 Units of Blood thus far. Etiology likely d/t ESRD and JED. No acute blood loss notes.   Current CBC reviewed-   Lab Results   Component Value Date    HGB 8.7 (L) 12/15/2023    HCT 25.6 (L) 12/15/2023     Monitor serial CBC and transfuse if patient becomes hemodynamically unstable, symptomatic or H/H drops below 7/21.       Chronic congestive heart failure with left ventricular diastolic dysfunction  Patient is identified as having Diastolic (HFpEF) heart failure that is Chronic. CHF is currently controlled. Latest ECHO performed and demonstrates- Results for orders placed during the hospital encounter of 09/10/23    Echo    Interpretation Summary    Left Ventricle: The left ventricle is normal in size. Mildly increased ventricular mass. Mildly increased wall thickness. Normal wall motion. There is low normal systolic function with a visually estimated ejection fraction of 50 - 55%. There is normal diastolic function.    Right Ventricle: Normal right ventricular cavity size. Wall thickness is normal. Right ventricle wall motion  is normal. Systolic function is normal.    IVC/SVC: Normal venous pressure at 3 mmHg.    Pericardium: There is a small circumferential effusion. Pericardial effusion is echolucent. No indication of cardiac tamponade. Evidence includes no chamber collapse.  . Continue Beta Blocker and ACE/ARB and monitor clinical status closely. Monitor on telemetry. Patient is off CHF pathway.  Monitor strict Is&Os and daily weights.      Type 2 diabetes mellitus with hyperglycemia, with long-term current use of insulin, previous  HbA1c 5.8%  Patient's FSGs are controlled on current medication regimen. Pt did have episode of hypoglycemia which has resolved.   Last A1c reviewed-   Lab Results   Component Value Date    HGBA1C 5.8 08/01/2023     Most recent fingerstick glucose reviewed-   POC Glucose   Date Value Ref Range Status   12/15/2023 149 (H) 70 - 110 Final   12/14/2023 101 70 - 110 Final   12/14/2023 101 70 - 110 Final         Current correctional scale  Medium  Maintain anti-hyperglycemic dose as follows-   Antihyperglycemics (From admission, onward)      Start     Stop Route Frequency Ordered    12/13/23 0645  insulin aspart U-100 pen 8 Units         -- SubQ 3 times daily before meals 12/12/23 1728    12/12/23 2100  insulin detemir U-100 (Levemir) pen 21 Units         -- SubQ Nightly 12/12/23 1728    12/12/23 1728  insulin aspart U-100 pen 1-10 Units         -- SubQ Before meals & nightly PRN 12/12/23 1728          Hold Oral hypoglycemics while patient is in the hospital.        Thrombocytopenia  At baseline- continue to monitor    Lab Results   Component Value Date    PLT 85 (L) 12/15/2023         ESRD (end stage renal disease)  Nephrologist is managing dialysis needs.    Seizure disorder  C/w Keppra      Sickle cell trait  Heme consulted-unlikely sickle cell crisis   Anemia stable      Gastroparesis - suspected, unconfirmed  Unknown hx, will need follow with GI outpatient  Did not start reglan given contraindication in seizure disorder  Diffuse abdominal discomfort odd for gastroparesis         VTE Risk Mitigation (From admission, onward)           Ordered     IP VTE HIGH RISK PATIENT  Once         12/12/23 1728     Place sequential compression device  Until discontinued         12/12/23 1728     Reason for No Pharmacological VTE Prophylaxis  Once        Question:  Reasons:  Answer:  Already adequately anticoagulated on oral Anticoagulants    12/12/23 1728                    Discharge Planning   TATIANA: 12/18/2023     Code Status: Full  Code   Is the patient medically ready for discharge?:     Reason for patient still in hospital (select all that apply): Patient unstable  Discharge Plan A: Home with family                  Donavon Bailey MD  Department of Hospital Medicine   [facility]

## 2023-12-15 NOTE — ASSESSMENT & PLAN NOTE
Patient's anemia is currently controlled. Has not received any PRBCs to date.. Etiology likely d/t ESRD  Current CBC reviewed-   Lab Results   Component Value Date    HGB 8.0 (L) 12/14/2023    HCT 22.9 (L) 12/14/2023     Monitor serial CBC and transfuse if patient becomes hemodynamically unstable, symptomatic or H/H drops below 7/21.

## 2023-12-15 NOTE — ASSESSMENT & PLAN NOTE
Hypertension Medications               amLODIPine (NORVASC) 5 MG tablet Take 1 tablet (5 mg total) by mouth once daily.    carvediloL (COREG) 25 MG tablet Take 1 tablet (25 mg total) by mouth 2 (two) times daily.    hydrALAZINE (APRESOLINE) 100 MG tablet Take 1 tablet (100 mg total) by mouth 2 (two) times daily.    isosorbide mononitrate (IMDUR) 30 MG 24 hr tablet Take 1 tablet (30 mg total) by mouth once daily.          Continue her usual BP meds which are listed above.  Monitor pressures.  Controlled at present.

## 2023-12-15 NOTE — ASSESSMENT & PLAN NOTE
Noted. Patient with suspected gastroparesis  GI consult done- possible cholangitis- no fever though.  Await MRCP to be done to confirm suspicions.  Keep NPO just in case if needs ERCP.  Continue IVAB empirically.

## 2023-12-15 NOTE — RESPIRATORY THERAPY
12/14/23 2035   Patient Assessment/Suction   Level of Consciousness (AVPU) alert   Respiratory Effort Normal;Unlabored   Expansion/Accessory Muscles/Retractions no use of accessory muscles;no retractions   All Lung Fields Breath Sounds Anterior:;Posterior:;diminished   Rhythm/Pattern, Respiratory pattern regular;unlabored   Cough Frequency no cough   PRE-TX-O2   Device (Oxygen Therapy) room air   SpO2 98 %   Pulse Oximetry Type Intermittent   $ Pulse Oximetry - Multiple Charge Pulse Oximetry - Multiple   Pulse 86   Resp 17   Positioning HOB elevated 30 degrees   Aerosol Therapy   $ Aerosol Therapy Charges PRN treatment not required   Education   $ Education 15 min;Other (see comment)  (O2 CHECK)   Respiratory Evaluation   $ Care Plan Tech Time 15 min   $ Eval/Re-eval Charges Evaluation   Evaluation For New Orders

## 2023-12-15 NOTE — ASSESSMENT & PLAN NOTE
Patient is identified as having Diastolic (HFpEF) heart failure that is Chronic. CHF is currently controlled. Latest ECHO performed and demonstrates- Results for orders placed during the hospital encounter of 09/10/23    Echo    Interpretation Summary    Left Ventricle: The left ventricle is normal in size. Mildly increased ventricular mass. Mildly increased wall thickness. Normal wall motion. There is low normal systolic function with a visually estimated ejection fraction of 50 - 55%. There is normal diastolic function.    Right Ventricle: Normal right ventricular cavity size. Wall thickness is normal. Right ventricle wall motion  is normal. Systolic function is normal.    IVC/SVC: Normal venous pressure at 3 mmHg.    Pericardium: There is a small circumferential effusion. Pericardial effusion is echolucent. No indication of cardiac tamponade. Evidence includes no chamber collapse.  . Continue Beta Blocker and ACE/ARB and monitor clinical status closely. Monitor on telemetry. Patient is off CHF pathway.  Monitor strict Is&Os and daily weights.

## 2023-12-15 NOTE — ASSESSMENT & PLAN NOTE
Patient's anemia is currently controlled. Has received 3 Units of Blood thus far. Etiology likely d/t ESRD and JED. No acute blood loss notes.   Current CBC reviewed-   Lab Results   Component Value Date    HGB 8.7 (L) 12/15/2023    HCT 25.6 (L) 12/15/2023     Monitor serial CBC and transfuse if patient becomes hemodynamically unstable, symptomatic or H/H drops below 7/21.

## 2023-12-15 NOTE — ASSESSMENT & PLAN NOTE
Patient's FSGs are controlled on current medication regimen. Pt did have episode of hypoglycemia which has resolved.   Last A1c reviewed-   Lab Results   Component Value Date    HGBA1C 5.8 08/01/2023     Most recent fingerstick glucose reviewed-   POC Glucose   Date Value Ref Range Status   12/15/2023 149 (H) 70 - 110 Final   12/14/2023 101 70 - 110 Final   12/14/2023 101 70 - 110 Final         Current correctional scale  Medium  Maintain anti-hyperglycemic dose as follows-   Antihyperglycemics (From admission, onward)      Start     Stop Route Frequency Ordered    12/13/23 0645  insulin aspart U-100 pen 8 Units         -- SubQ 3 times daily before meals 12/12/23 1728    12/12/23 2100  insulin detemir U-100 (Levemir) pen 21 Units         -- SubQ Nightly 12/12/23 1728    12/12/23 1728  insulin aspart U-100 pen 1-10 Units         -- SubQ Before meals & nightly PRN 12/12/23 1728          Hold Oral hypoglycemics while patient is in the hospital.

## 2023-12-15 NOTE — PROGRESS NOTES
[facility] Medicine  Progress Note    Patient Name: Tabby Howard  MRN: [id number]  Patient Class: IP- Inpatient   Admission Date: 12/12/2023  Length of Stay: 1 days  Attending Physician: Jason Peres MD  Primary Care Provider: Melony Kim NP        Subjective:     Principal Problem:Abdominal pain        HPI:  Ms. Howard is a 33-year-old female with history of ESRD on HD, suspected gastroparesis, sickle cell trait, hypertension, pericardial effusion, type 2 diabetes who was admitted at [facility] for n/v and was found to be in acidosis, anemic and hyperkalemic- Hd was done yesterday and 2 units were tranfused. Today at the other [place] other abdominal pain got worse, LFTs went up and she also had RRT for lethargy, low BP, low BG and for the concern of cholangitis she was transferred here. GI consult was also done there and pending MRCP.       Pt was seen in ICU and was able to give some hx, was telling her abdominal pain is 10/10, sharp happening at r side and its different from when she had gall stones removed in the past. BP stable, pulse regular, Hb is still low- transfusing one more unit and sending for MRCP.             Overview/Hospital Course:  No notes on file    Interval History:  continues with abdominal pain.  Liver tests are better today.  Stable BP.    Review of Systems   Constitutional:  Negative for fever.   Respiratory:  Negative for shortness of breath.    Cardiovascular:  Negative for chest pain.     Objective:     Vital Signs (Most Recent):  Temp: 98.5 °F (36.9 °C) (12/14/23 1658)  Pulse: 85 (12/14/23 1658)  Resp: 15 (12/14/23 1658)  BP: (!) 153/78 (12/14/23 1658)  SpO2: 99 % (12/14/23 1658) Vital Signs (24h Range):  Temp:  [97.7 °F (36.5 °C)-99.5 °F (37.5 °C)] 98.5 °F (36.9 °C)  Pulse:  [75-85] 85  Resp:  [11-27] 15  SpO2:  [91 %-100 %] 99 %  BP: (110-159)/(61-89) 153/78     Weight: 59.5 kg (131 lb 2.8 oz)  Body mass index is 23.99 kg/m².    Intake/Output  Summary (Last 24 hours) at 12/14/2023 1957  Last data filed at 12/14/2023 1430  Gross per 24 hour   Intake 1430.36 ml   Output 4500 ml   Net -3069.64 ml         Physical Exam  Constitutional:       General: She is not in acute distress.     Appearance: She is not ill-appearing.   Eyes:      General:         Right eye: No discharge.         Left eye: No discharge.   Neck:      Vascular: No JVD.   Cardiovascular:      Rate and Rhythm: Normal rate and regular rhythm.   Pulmonary:      Effort: Pulmonary effort is normal.      Breath sounds: Normal breath sounds.   Abdominal:      General: Abdomen is flat. Bowel sounds are normal. There is no distension.      Palpations: Abdomen is soft.      Tenderness: There is no abdominal tenderness.   Musculoskeletal:      Right lower leg: No edema.      Left lower leg: No edema.   Skin:     General: Skin is warm and moist.      Findings: No rash.   Neurological:      Mental Status: She is alert and oriented to person, place, and time.   Psychiatric:         Attention and Perception: Attention normal.         Mood and Affect: Mood and affect normal.         Speech: Speech normal.             Significant Labs: All pertinent labs within the past 24 hours have been reviewed.    Significant Imaging: I have reviewed all pertinent imaging results/findings within the past 24 hours.    Assessment/Plan:      * Abdominal pain  Noted. Patient with suspected gastroparesis  GI consult done- possible cholangitis- no fever though.  Await MRCP to be done to confirm suspicions.  Keep NPO just in case if needs ERCP.  Continue IVAB empirically.      Hypertension  Hypertension Medications               amLODIPine (NORVASC) 5 MG tablet Take 1 tablet (5 mg total) by mouth once daily.    carvediloL (COREG) 25 MG tablet Take 1 tablet (25 mg total) by mouth 2 (two) times daily.    hydrALAZINE (APRESOLINE) 100 MG tablet Take 1 tablet (100 mg total) by mouth 2 (two) times daily.    isosorbide mononitrate  (IMDUR) 30 MG 24 hr tablet Take 1 tablet (30 mg total) by mouth once daily.          Continue her usual BP meds which are listed above.  Monitor pressures.  Controlled at present.      Hyperkalemia  This patient has hyperkalemia which improved with HD.. We will monitor for arrhythmias with EKG or continuous telemetry. We will treat the hyperkalemia with HD. The likely etiology of the hyperkalemia is ESRD.  The patients latest potassium has been reviewed and the results are listed below  Recent Labs   Lab 12/14/23  0409   K 5.2*               Anemia in ESRD (end-stage renal disease)  Patient's anemia is currently controlled. Has not received any PRBCs to date.. Etiology likely d/t ESRD  Current CBC reviewed-   Lab Results   Component Value Date    HGB 8.0 (L) 12/14/2023    HCT 22.9 (L) 12/14/2023     Monitor serial CBC and transfuse if patient becomes hemodynamically unstable, symptomatic or H/H drops below 7/21.       Chronic congestive heart failure with left ventricular diastolic dysfunction  Patient is identified as having Diastolic (HFpEF) heart failure that is Chronic. CHF is currently controlled. Latest ECHO performed and demonstrates- Results for orders placed during the hospital encounter of 09/10/23    Echo    Interpretation Summary    Left Ventricle: The left ventricle is normal in size. Mildly increased ventricular mass. Mildly increased wall thickness. Normal wall motion. There is low normal systolic function with a visually estimated ejection fraction of 50 - 55%. There is normal diastolic function.    Right Ventricle: Normal right ventricular cavity size. Wall thickness is normal. Right ventricle wall motion  is normal. Systolic function is normal.    IVC/SVC: Normal venous pressure at 3 mmHg.    Pericardium: There is a small circumferential effusion. Pericardial effusion is echolucent. No indication of cardiac tamponade. Evidence includes no chamber collapse.  . Continue Beta Blocker and ACE/ARB and  monitor clinical status closely. Monitor on telemetry. Patient is off CHF pathway.  Monitor strict Is&Os and daily weights.      Type 2 diabetes mellitus with hyperglycemia, with long-term current use of insulin, previous HbA1c 5.8%  Patient's FSGs are controlled on current medication regimen.  Last A1c reviewed-   Lab Results   Component Value Date    HGBA1C 5.8 08/01/2023     Most recent fingerstick glucose reviewed-   POC Glucose   Date Value Ref Range Status   12/14/2023 101 70 - 110 Final   12/14/2023 163 (H) 70 - 110 Final   12/14/2023 144 (H) 70 - 110 Final         Current correctional scale  Medium  Maintain anti-hyperglycemic dose as follows-   Antihyperglycemics (From admission, onward)      Start     Stop Route Frequency Ordered    12/13/23 0645  insulin aspart U-100 pen 8 Units         -- SubQ 3 times daily before meals 12/12/23 1728    12/12/23 2100  insulin detemir U-100 (Levemir) pen 21 Units         -- SubQ Nightly 12/12/23 1728    12/12/23 1728  insulin aspart U-100 pen 1-10 Units         -- SubQ Before meals & nightly PRN 12/12/23 1728          Hold Oral hypoglycemics while patient is in the hospital.        Thrombocytopenia  At baseline- continue to monitor    Lab Results   Component Value Date    PLT 90 (L) 12/14/2023         ESRD (end stage renal disease)  Nephrologist is managing dialysis needs.    Seizure disorder  C/w Keppra        VTE Risk Mitigation (From admission, onward)           Ordered     IP VTE HIGH RISK PATIENT  Once         12/12/23 1728     Place sequential compression device  Until discontinued         12/12/23 1728     Reason for No Pharmacological VTE Prophylaxis  Once        Question:  Reasons:  Answer:  Already adequately anticoagulated on oral Anticoagulants    12/12/23 1728                    Discharge Planning   TATIANA: 12/16/2023     Code Status: Full Code   Is the patient medically ready for discharge?:     Reason for patient still in hospital (select all that apply):  Patient trending condition, Imaging, and Consult recommendations  Discharge Plan A: Home with family                  Jason Peres MD  Department of Hospital Medicine   [facility]

## 2023-12-15 NOTE — ASSESSMENT & PLAN NOTE
Unknown hx, will need follow with GI outpatient  Did not start reglan given contraindication in seizure disorder  Diffuse abdominal discomfort odd for gastroparesis

## 2023-12-15 NOTE — CONSULTS
[facility]  Hematology/Oncology  Consult Note    Patient Name: Tabby Howard  MRN: [id number]  Admission Date: 12/12/2023  Hospital Length of Stay: 2 days  Code Status: Full Code   Attending Provider: Donavon Bailey MD  Consulting Provider: Leigh Ann Sanford NP  Primary Care Physician: Melony Kim NP  Principal Problem:Abdominal pain    Consults  Subjective:     HPI:  33-year-old female with known history of gastroparesis, chronic pain, sickle cell trait, hypertension, pericardial effusion, and diabetes.  She presented to the ED with complaints of abdominal pain.  There was concern for cholangitis however, She was unable to tolerate MRCP.  At the time of my visit she is in bed rolling around moaning and crying.  I asked where she was hurting.  She would not answer my question.  I asked her to point to where the pain is.  Patient pointed to her abdomen.  She would not answer any of my questions in regards to her medical history and no family was available at the time of my visit.  I notify her nurse of her complaints of abdominal pain    Oncology Treatment Plan:   [No matching plan found]    Medications:  Continuous Infusions:  Scheduled Meds:   amLODIPine  5 mg Oral Daily    carvediloL  25 mg Oral BID    epoetin teresa-epbx  100 Units/kg Subcutaneous Every Tues, Thurs, Sat    FLUoxetine  20 mg Oral Daily    gabapentin  600 mg Oral BID    hydrALAZINE  100 mg Oral BID    insulin aspart U-100  8 Units Subcutaneous TID AC    insulin detemir U-100 (Levemir)  21 Units Subcutaneous QHS    isosorbide mononitrate  30 mg Oral Daily    levETIRAcetam  500 mg Oral BID    meropenem (MERREM) IVPB  500 mg Intravenous Q24H    mupirocin   Nasal BID    polyethylene glycol  17 g Oral Daily     PRN Meds:acetaminophen, albuterol-ipratropium, aluminum-magnesium hydroxide-simethicone, dextrose 10%, dextrose 10%, dextrose 50%, dextrose 50%, glucagon (human recombinant), glucose, glucose, haloperidol lactate, insulin aspart  U-100, melatonin, morphine, naloxone, ondansetron, prochlorperazine, simethicone, sodium chloride 0.9%     Review of patient's allergies indicates:   Allergen Reactions    Penicillins Hives        Past Medical History:   Diagnosis Date    ESRD on hemodialysis 2022    Gastritis 2022    EGD was 21    Gastroparesis 2022    has not had Emptying study    Heart failure with preserved ejection fraction 2022    EF 55% on 3/22    History of supraventricular tachycardia     Hyperkalemia 2022    Hypertensive emergency 2022    Sickle cell trait 2022    Type 2 diabetes mellitus      Past Surgical History:   Procedure Laterality Date     SECTION      x 3    COLONOSCOPY      COLONOSCOPY N/A 2022    Procedure: COLONOSCOPY;  Surgeon: Jagdeep Cedeno MD;  Location: [facility];  Service: Endoscopy;  Laterality: N/A;    ESOPHAGOGASTRODUODENOSCOPY N/A 10/18/2019    Procedure: ESOPHAGOGASTRODUODENOSCOPY (EGD);  Surgeon: Gianluca Mendez MD;  Location: [facility];  Service: Endoscopy;  Laterality: N/A;    ESOPHAGOGASTRODUODENOSCOPY N/A 2022    Procedure: EGD (ESOPHAGOGASTRODUODENOSCOPY);  Surgeon: Micky Paredes III, MD;  Location: [facility];  Service: Endoscopy;  Laterality: N/A;    ESOPHAGOGASTRODUODENOSCOPY N/A 2022    Procedure: EGD (ESOPHAGOGASTRODUODENOSCOPY);  Surgeon: Marcelo Zhong MD;  Location: [facility];  Service: Endoscopy;  Laterality: N/A;    INSERTION, CATHETER, TUNNELED N/A 2023    Procedure: Insertion,catheter,tunneled;  Surgeon: Carlos Thurman Jr., MD;  Location: [facility];  Service: General;  Laterality: N/A;    LAPAROSCOPIC CHOLECYSTECTOMY N/A 2022    Procedure: CHOLECYSTECTOMY, LAPAROSCOPIC;  Surgeon: Grey Perez MD;  Location: [facility];  Service: General;  Laterality: N/A;    PLACEMENT OF DUAL-LUMEN VASCULAR CATHETER Left 2022    Procedure: INSERTION-CATHETER-JOSEPH;  Surgeon: Dionte Gan MD;  Location: [facility];   Service: General;  Laterality: Left;    PLACEMENT OF DUAL-LUMEN VASCULAR CATHETER Right 07/26/2022    Procedure: INSERTION-CATHETER-Hemosplit;  Surgeon: Dionte Gan MD;  Location: [facility];  Service: General;  Laterality: Right;     Family History       Problem Relation (Age of Onset)    Diabetes Mother, Father          Tobacco Use    Smoking status: Never    Smokeless tobacco: Never   Substance and Sexual Activity    Alcohol use: Not Currently    Drug use: No    Sexual activity: Not Currently     Partners: Male     Birth control/protection: I.U.D.       Review of Systems  As per mentioned in HPI; all other systems reviewed and negative  Objective:     Vital Signs (Most Recent):  Temp: 97.6 °F (36.4 °C) (12/15/23 0833)  Pulse: 85 (12/15/23 0833)  Resp: 20 (12/15/23 1003)  BP: (!) 143/84 (12/15/23 0957)  SpO2: 100 % (12/15/23 0833) Vital Signs (24h Range):  Temp:  [97.6 °F (36.4 °C)-99.3 °F (37.4 °C)] 97.6 °F (36.4 °C)  Pulse:  [81-89] 85  Resp:  [12-25] 20  SpO2:  [95 %-100 %] 100 %  BP: (118-174)/() 143/84     Weight: 51.9 kg (114 lb 6.7 oz)  Body mass index is 20.93 kg/m².  Body surface area is 1.51 meters squared.      Intake/Output Summary (Last 24 hours) at 12/15/2023 1505  Last data filed at 12/15/2023 1344  Gross per 24 hour   Intake 0 ml   Output 0 ml   Net 0 ml       Physical Exam  GENERAL: appears ill and uncomfortable  Patient is awake, alert, but would not answer any of my questions  HEENT:  Showed no congestion. Trachea is central no obvious icterus or pallor noted via video.  NECK:  Supple.  No JVD. No obvious cervical submental or supraclavicular adenopathy.  RS:the visualized portion of  Chest expands well. chest appears symmetric, no audible wheezes.  ABDOMEN: the visualized portion of  abdomen appears undistended.  EXTREMITIES:  Without edema.  NEUROLOGICAL:  The patient is appropriate, higher functions are normal.  No neuro deficits.  No gait abnormality noted.  No confusion, no speech  impediment. Cranial nerves are intact and show no deficit. No motor deficits noted through video.  SKIN MUSCULOSKELETAL: no joint or skeletal deformity, ambulating around room, no clubbing of nails.   Significant Labs:   CBC:   Recent Labs   Lab 12/13/23  1557 12/14/23  0513 12/15/23  0543   WBC 12.77* 14.07* 11.75   HGB 6.7* 8.0* 8.7*   HCT 19.9* 22.9* 25.6*   PLT 95* 90* 85*    and CMP:   Recent Labs   Lab 12/13/23  1557 12/14/23  0409 12/15/23  0543    142 139   K 5.4* 5.2* 4.1    103 100   CO2 14* 25 19*   * 130* 113*   BUN 52* 62* 35*   CREATININE 7.4* 7.8* 6.0*   CALCIUM 8.4* 7.9* 9.1   PROT 6.5 6.8 7.4   ALBUMIN 3.0* 3.8 4.0   BILITOT 4.0* 1.4* 1.7*   ALKPHOS 206* 198* 202*   * 103* 48*   * 104* 83*   ANIONGAP 24* 14 20*       Diagnostic Results:  I have reviewed all pertinent imaging results/findings within the past 24 hours.    Assessment/Plan:     Active Diagnoses:    Diagnosis Date Noted POA    PRINCIPAL PROBLEM:  Abdominal pain [R10.9] 09/19/2023 Yes    Hypertension [I10] 06/17/2023 Yes    Hyperkalemia [E87.5] 04/22/2023 Yes    Anemia in ESRD (end-stage renal disease) [N18.6, D63.1] 03/20/2023 Yes    Chronic congestive heart failure with left ventricular diastolic dysfunction [I50.32] 03/02/2023 Yes    Type 2 diabetes mellitus with hyperglycemia, with long-term current use of insulin, previous HbA1c 5.8% [E11.65, Z79.4] 01/27/2023 Not Applicable    Thrombocytopenia [D69.6] 10/26/2022 Yes     Chronic    ESRD (end stage renal disease) [N18.6] 07/22/2022 Yes    Seizure disorder [G40.909] 05/29/2022 Yes     Chronic      Problems Resolved During this Admission:       Anemia:   -last hemoglobin electrophoresis confirmed sickle cell trait so crises is not likely  -anemia is likely related to iron deficiency and renal disease.  -we will check iron stores and treat accordingly delay per nephrology  -patient may need SHELLEY therapy    Case discussed with Dr. Khoobehi    Thank you for  your consult. I will follow-up with patient. Please contact us if you have any additional questions.    Leigh Ann Sanford NP  Hematology/Oncology  [facility]

## 2023-12-15 NOTE — ASSESSMENT & PLAN NOTE
Patient's FSGs are controlled on current medication regimen.  Last A1c reviewed-   Lab Results   Component Value Date    HGBA1C 5.8 08/01/2023     Most recent fingerstick glucose reviewed-   POC Glucose   Date Value Ref Range Status   12/14/2023 101 70 - 110 Final   12/14/2023 163 (H) 70 - 110 Final   12/14/2023 144 (H) 70 - 110 Final         Current correctional scale  Medium  Maintain anti-hyperglycemic dose as follows-   Antihyperglycemics (From admission, onward)      Start     Stop Route Frequency Ordered    12/13/23 0645  insulin aspart U-100 pen 8 Units         -- SubQ 3 times daily before meals 12/12/23 1728    12/12/23 2100  insulin detemir U-100 (Levemir) pen 21 Units         -- SubQ Nightly 12/12/23 1728    12/12/23 1728  insulin aspart U-100 pen 1-10 Units         -- SubQ Before meals & nightly PRN 12/12/23 1728          Hold Oral hypoglycemics while patient is in the hospital.

## 2023-12-15 NOTE — ASSESSMENT & PLAN NOTE
Patient is identified as having Diastolic (HFpEF) heart failure that is Chronic. CHF is currently controlled. Latest ECHO performed and demonstrates- Results for orders placed during the hospital encounter of 09/10/23    Echo    Interpretation Summary    Left Ventricle: The left ventricle is normal in size. Mildly increased ventricular mass. Mildly increased wall thickness. Normal wall motion. There is low normal systolic function with a visually estimated ejection fraction of 50 - 55%. There is normal diastolic function.    Right Ventricle: Normal right ventricular cavity size. Wall thickness is normal. Right ventricle wall motion  is normal. Systolic function is normal.    IVC/SVC: Normal venous pressure at 3 mmHg.    Pericardium: There is a small circumferential effusion. Pericardial effusion is echolucent. No indication of cardiac tamponade. Evidence includes no chamber collapse.  . Continue Beta Blocker and ACE/ARB and monitor clinical status closely. Monitor on telemetry. Patient is off CHF pathway.  Monitor strict Is&Os and daily weights.

## 2023-12-15 NOTE — PROGRESS NOTES
Nephrology Consult Note        Patient Name: Tabby Howard  MRN: [id number]    Patient Class: IP- Inpatient   Admission Date: 2023  Length of Stay: 2 days  Date of Service: 12/15/2023    Attending Physician: Donavon Bailey MD  Primary Care Provider: Melony Kim NP    Reason for Consult: esrd    SUBJECTIVE:     HPI: 33F ESRD on HD TTS by Dr. Figueroa, known gastroparesis and chronic pain, sickle cell trait, hypertension, pericardial effusion, type 2 diabetes admitted at [facility] for n/v and was found to be in acidosis, anemic and hyperkalemic- HD was done and 2 units were tranfused. Her abdominal pain got worse, LFTs went up and she had RRT called for lethargy, low BP, low BG. Due to concern of cholangitis, she was transferred to [facility] on  for possible MRCP. Will need HD today or tomorrow.    12/15 VSS. Could not tolerate MRCP due to panic attack. Tolerated HD yesterday. Next HD tomorrow. Got total 2 PRBC so far.    Past Medical History:   Diagnosis Date    ESRD on hemodialysis 2022    Gastritis 2022    EGD was 22    Gastroparesis 2022    has not had Emptying study    Heart failure with preserved ejection fraction 2022    EF 55% on 3/22    History of supraventricular tachycardia     Hyperkalemia 2022    Hypertensive emergency 2022    Sickle cell trait 2022    Type 2 diabetes mellitus      Past Surgical History:   Procedure Laterality Date     SECTION      x 3    COLONOSCOPY      COLONOSCOPY N/A 2022    Procedure: COLONOSCOPY;  Surgeon: Jagdeep Cedeno MD;  Location: [facility] ENDO;  Service: Endoscopy;  Laterality: N/A;    ESOPHAGOGASTRODUODENOSCOPY N/A 10/18/2019    Procedure: ESOPHAGOGASTRODUODENOSCOPY (EGD);  Surgeon: Gianluca Mendez MD;  Location: [facility];  Service: Endoscopy;  Laterality: N/A;    ESOPHAGOGASTRODUODENOSCOPY N/A 2022    Procedure: EGD (ESOPHAGOGASTRODUODENOSCOPY);  Surgeon: Micky Paredes III, MD;  Location: [facility]  ENDO;  Service: Endoscopy;  Laterality: N/A;    ESOPHAGOGASTRODUODENOSCOPY N/A 12/5/2022    Procedure: EGD (ESOPHAGOGASTRODUODENOSCOPY);  Surgeon: Marcelo Zhong MD;  Location: [facility] ENDO;  Service: Endoscopy;  Laterality: N/A;    INSERTION, CATHETER, TUNNELED N/A 6/17/2023    Procedure: Insertion,catheter,tunneled;  Surgeon: Carlos Thurman Jr., MD;  Location: [facility] OR;  Service: General;  Laterality: N/A;    LAPAROSCOPIC CHOLECYSTECTOMY N/A 07/30/2022    Procedure: CHOLECYSTECTOMY, LAPAROSCOPIC;  Surgeon: Grey Perez MD;  Location: [facility] OR;  Service: General;  Laterality: N/A;    PLACEMENT OF DUAL-LUMEN VASCULAR CATHETER Left 07/12/2022    Procedure: INSERTION-CATHETER-JOSEPH;  Surgeon: Dionte Gan MD;  Location: [facility] OR;  Service: General;  Laterality: Left;    PLACEMENT OF DUAL-LUMEN VASCULAR CATHETER Right 07/26/2022    Procedure: INSERTION-CATHETER-Hemosplit;  Surgeon: Dionte Gan MD;  Location: [facility] OR;  Service: General;  Laterality: Right;     Family History   Problem Relation Age of Onset    Diabetes Mother     Diabetes Father      Social History     Tobacco Use    Smoking status: Never    Smokeless tobacco: Never   Substance Use Topics    Alcohol use: Not Currently    Drug use: No       Review of patient's allergies indicates:   Allergen Reactions    Penicillins Hives       Outpatient meds:  No current facility-administered medications on file prior to encounter.     Current Outpatient Medications on File Prior to Encounter   Medication Sig Dispense Refill    amLODIPine (NORVASC) 5 MG tablet Take 1 tablet (5 mg total) by mouth once daily. (Patient taking differently: Take 5 mg by mouth once daily.) 30 tablet 1    apixaban (ELIQUIS) 5 mg Tab Take 1 tablet (5 mg total) by mouth 2 (two) times daily. 180 tablet 1    carvediloL (COREG) 25 MG tablet Take 1 tablet (25 mg total) by mouth 2 (two) times daily. 60 tablet 5    cetirizine (ZYRTEC) 10 MG tablet Take 1 tablet (10 mg total) by mouth  once daily. 30 tablet 0    doxycycline (VIBRAMYCIN) 100 MG Cap Take 1 capsule twice a day by oral route for 7 days, for infectoin. (Patient taking differently: Take 100 mg by mouth every 12 (twelve) hours.) 14 capsule 0    FLUoxetine 20 MG capsule Take 1 capsule (20 mg total) by mouth once daily. 30 capsule 5    fluticasone propionate (FLONASE ALLERGY RELIEF) 50 mcg/actuation nasal spray [place] 1 spray every day by intranasal route. (Patient taking differently: 1 spray by Each Nostril route Daily.) 16 g 2    gabapentin (NEURONTIN) 300 MG capsule Take 2 capsules twice a day by oral route for 90 days. (Patient taking differently: Take 600 mg by mouth 2 (two) times daily.) 360 capsule 1    hydrALAZINE (APRESOLINE) 100 MG tablet Take 1 tablet (100 mg total) by mouth 2 (two) times daily. 180 tablet 1    insulin aspart U-100 (NOVOLOG FLEXPEN U-100 INSULIN) 100 unit/mL (3 mL) InPn pen Inject 8 units 3 times a day by subcutaneous route before meals (Patient taking differently: Inject 8 Units into the skin 3 (three) times daily before meals.) 24 mL 1    insulin detemir U-100, Levemir, (LEVEMIR FLEXTOUCH U100 INSULIN) 100 unit/mL (3 mL) InPn pen Inject 21 units every day by subcutaneous route in the evening for 90 days. (Patient taking differently: Inject 21 Units into the skin every evening.) 15 mL 1    isosorbide mononitrate (IMDUR) 30 MG 24 hr tablet Take 1 tablet (30 mg total) by mouth once daily. 90 tablet 1    levETIRAcetam (KEPPRA) 500 MG Tab Take 1 tablet twice a day by oral route for 30 days. (Patient taking differently: Take 500 mg by mouth 2 (two) times daily.) 60 tablet 2    ondansetron (ZOFRAN-ODT) 4 MG TbDL Place 1 tablet (4 mg total) under the tongue every 8 (eight) hours. 24 tablet 2    oxyCODONE-acetaminophen (PERCOCET) 5-325 mg per tablet TAKE 1 TABLET EVERY 6 HOURS AS NEEDED FOP PAIN (Patient taking differently: Take 1 tablet by mouth every 6 (six) hours as needed for Pain.) 15 tablet 0    pantoprazole  (PROTONIX) 40 MG tablet Take 1 tablet (40 mg total) by mouth once daily. 90 tablet 1    promethazine (PHENERGAN) 25 MG tablet Take 1 tablet (25 mg total) by mouth every 6 (six) hours as needed for 5 days. (Patient taking differently: Take 25 mg by mouth every 6 (six) hours as needed for Nausea.) 20 tablet 2    sevelamer carbonate (RENVELA) 800 mg Tab Take 2 tablets with meals three times a day (Patient taking differently: Take 1,600 mg by mouth 3 (three) times daily with meals.) 180 tablet 11    sucralfate (CARAFATE) 100 mg/mL suspension Take 10 mL 4 times a day by oral route before meals for 30 days. (Patient taking differently: Take 1 g by mouth 4 (four) times daily with meals and nightly.) 1200 mL 1    amLODIPine (NORVASC) 5 MG tablet Take 1 tablet every day by oral route for 90 days. 90 tablet 1    blood sugar diagnostic (TRUE METRIX GLUCOSE TEST STRIP) Strp Use 1 strip to check blood glucose three times daily 100 each 11    blood-glucose meter (TRUE METRIX GLUCOSE METER) Misc Use to check blood glucose 1 each 0    brimonidine 0.2% (ALPHAGAN) 0.2 % Drop Place 1 drop in right eye twice daily. 10 mL 5    carvediloL (COREG) 25 MG tablet Take 1 tablet (25 mg total) by mouth 2 (two) times daily. 60 tablet 2    ciprofloxacin HCl (CILOXAN) 0.3 % ophthalmic solution instill 1 drop into the right eye 4 times a day for 30 days 5 mL 0    FLUoxetine 20 MG capsule Take 1 capsule (20 mg total) by mouth once daily. 90 capsule 1    gabapentin (NEURONTIN) 300 MG capsule Take 2 capsules (600 mg total) by mouth 2 (two) times daily. 360 capsule 1    insulin aspart U-100 (NOVOLOG FLEXPEN U-100 INSULIN) 100 unit/mL (3 mL) InPn pen Inject 10 units 3 times a day by subcutaneous route before meals for 90 days. 27 mL 2    insulin detemir U-100, Levemir, (LEVEMIR FLEXTOUCH U100 INSULIN) 100 unit/mL (3 mL) InPn pen Inject 18 units every day by subcutaneous route in the evening 18 mL 1    insulin detemir U-100, Levemir, (LEVEMIR FLEXTOUCH  "U100 INSULIN) 100 unit/mL (3 mL) InPn pen Inject 22 units every day by subcutaneous route in the evening for 90 days. 18 mL 1    isosorbide mononitrate (IMDUR) 30 MG 24 hr tablet Take 1 tablet (30 mg total) by mouth once daily. 30 tablet 1    ketorolac 0.5% (ACULAR) 0.5 % Drop Instill 1 drop into the right eye 4 times a day for 30 days 3 mL 5    lancets (TRUEPLUS LANCETS) 33 gauge Misc Use 1 to check blood glucose three times daily 100 each 11    lancets 33 gauge Misc Use to test twice daily 100 each 5    levETIRAcetam (KEPPRA) 500 MG Tab Take 1 tablet (500 mg total) by mouth 2 (two) times daily. 60 tablet 2    ondansetron (ZOFRAN-ODT) 4 MG TbDL Dissolve 1 tablet (4 mg total) by mouth every 8 (eight) hours. 24 tablet 2    pen needle, diabetic 31 gauge x 3/16" Ndle Use as directed to inject insulin 5 times daily 100 each 11    prednisoLONE acetate (PRED FORTE) 1 % DrpS Instill 1 drops into the right eye 4 times a day for 30 days 5 mL 1    promethazine (PHENERGAN) 25 MG suppository Place 1 suppository (25 mg total) rectally every 6 (six) hours as needed for Nausea. 10 suppository 0    sucroferric oxyhydroxide (VELPHORO) 500 mg Chew Take 1 tablet 3 times a day (Patient taking differently: Take 1 tablet by mouth 3 (three) times daily.) 90 tablet 6    timolol maleate 0.5% (TIMOPTIC) 0.5 % Drop Instill 1 drop into the right eye 2 times a day for 30 days 5 mL 6    [DISCONTINUED] atenoloL (TENORMIN) 50 MG tablet Take 1 tablet (50 mg total) by mouth every other day. 45 tablet 3    [DISCONTINUED] omeprazole (PRILOSEC) 20 MG capsule Take 2 capsules (40 mg total) by mouth once daily. for 10 days 20 capsule 0       Scheduled meds:   amLODIPine  5 mg Oral Daily    carvediloL  25 mg Oral BID    epoetin teresa-epbx  100 Units/kg Subcutaneous Every Tues, Thurs, Sat    FLUoxetine  20 mg Oral Daily    gabapentin  600 mg Oral BID    hydrALAZINE  100 mg Oral BID    insulin aspart U-100  8 Units Subcutaneous TID AC    insulin detemir U-100 " (Levemir)  21 Units Subcutaneous QHS    isosorbide mononitrate  30 mg Oral Daily    levETIRAcetam  500 mg Oral BID    meropenem (MERREM) IVPB  500 mg Intravenous Q24H    mupirocin   Nasal BID    polyethylene glycol  17 g Oral Daily       Infusions:        PRN meds:  acetaminophen, albuterol-ipratropium, aluminum-magnesium hydroxide-simethicone, dextrose 10%, dextrose 10%, dextrose 50%, dextrose 50%, glucagon (human recombinant), glucose, glucose, haloperidol lactate, insulin aspart U-100, melatonin, morphine, naloxone, ondansetron, prochlorperazine, simethicone, sodium chloride 0.9%    Review of Systems:    OBJECTIVE:     Vital Signs and IO:  Temp:  [97.6 °F (36.4 °C)-99.3 °F (37.4 °C)]   Pulse:  [79-89]   Resp:  [12-25]   BP: (118-174)/()   SpO2:  [95 %-100 %]   I/O last 3 completed shifts:  In: 1430.4 [I.V.:1035; Blood:296; IV Piggyback:99.4]  Out: 4500 [Other:4500]  Wt Readings from Last 5 Encounters:   12/15/23 51.9 kg (114 lb 6.7 oz)   10/18/23 61.8 kg (136 lb 3.9 oz)   10/13/23 59.4 kg (130 lb 15.3 oz)   10/03/23 60.3 kg (132 lb 15 oz)   09/19/23 48.9 kg (107 lb 12.9 oz)     Body mass index is 20.93 kg/m².    Physical Exam  Constitutional:       General: Patient is not in acute distress.     Appearance: Patient is well-developed. She is not diaphoretic.   HENT:      Head: Normocephalic and atraumatic.      Mouth/Throat: Mucous membranes are moist.   Eyes:      General: No scleral icterus.     Pupils: Pupils are equal, round, and reactive to light.   Cardiovascular:      Rate and Rhythm: Normal rate and regular rhythm.   Pulmonary:      Effort: Pulmonary effort is normal. No respiratory distress.      Breath sounds: No stridor.   Abdominal:      General: There is no distension.      Palpations: Abdomen is soft.   Musculoskeletal:         General: No deformity. Normal range of motion.      Cervical back: Neck supple.   Skin:     General: Skin is warm and dry.      Findings: No rash present. No erythema.    Neurological:      Mental Status: Patient is alert and oriented to person, place, and time.      Cranial Nerves: No cranial nerve deficit.   Psychiatric:         Behavior: Behavior normal.     Laboratory:  Recent Labs   Lab 12/13/23  1557 12/14/23  0409 12/15/23  0543    142 139   K 5.4* 5.2* 4.1    103 100   CO2 14* 25 19*   BUN 52* 62* 35*   CREATININE 7.4* 7.8* 6.0*   * 130* 113*         Recent Labs   Lab 12/13/23  0427 12/13/23  1557 12/14/23  0409 12/15/23  0543   CALCIUM 9.3 8.4* 7.9* 9.1   ALBUMIN 3.7 3.0* 3.8 4.0   PHOS 5.5*  --  7.9* 5.4*   MG 2.2  --  2.4 2.2         Recent Labs   Lab 07/08/22  0516   PTH, Intact 289.8 H         Recent Labs   Lab 12/12/23  0906 12/12/23  2131 12/13/23  0742 12/13/23  1129 12/13/23  1545 12/13/23  1601 12/13/23  1620 12/13/23  1726   POCTGLUCOSE 307* 132* 176* 165* 34* 194* 183* 167*         Recent Labs   Lab 12/23/22  1413 03/03/23  0547 08/01/23  0813   Hemoglobin A1C 7.5 H 5.8 5.8         Recent Labs   Lab 12/13/23  1557 12/14/23  0513 12/15/23  0543   WBC 12.77* 14.07* 11.75   HGB 6.7* 8.0* 8.7*   HCT 19.9* 22.9* 25.6*   PLT 95* 90* 85*   MCV 86 85 88   MCHC 33.7 34.9 34.0   MONO 7.4  1.0 8.4  1.2* 10.2  1.2*   EOSINOPHIL 0.1 1.5 3.1         Recent Labs   Lab 12/13/23  1557 12/14/23  0409 12/15/23  0543   BILITOT 4.0* 1.4* 1.7*   PROT 6.5 6.8 7.4   ALBUMIN 3.0* 3.8 4.0   ALKPHOS 206* 198* 202*   * 104* 83*   * 103* 48*         Recent Labs   Lab 03/24/21  1826 03/25/21  1910 03/16/22  1848 05/15/22  2022 02/05/23  0156 09/19/23  1422 10/13/23  1001   Color, UA Yellow   < > Pale Yellow   < > Yellow Yellow Yellow   Appearance, UA  --    < >  --    < > Clear Clear Clear   pH, UA  --    < >  --    < > >8.0 A >8.0 A 8.0   Specific [place], UA  --    < >  --    < > 1.020 1.020 1.015   Protein, UA  --    < >  --    < > 3+ A 4+ 4+   Glucose, UA >=500 mg/dL A   < > 50 mg/dL A   < > 2+ A 4+ A 3+ A   Ketones, UA  --    < >  --    < > 1+ A Trace  A Trace A   Urobilinogen, UA Normal   < > Normal   < > Negative Negative Negative   Bilirubin (UA) Negative   < > Negative   < > 2+ A Negative 1+ A   Occult Blood UA  --    < >  --    < > 3+ A 2+ A Negative   Blood, UA >1.0 mg/dL A   < > 0.03 mg/dL A   < >  --   --   --    Nitrite, UA  --    < >  --    < > Negative Negative Negative   RBC, UA  --    < >  --    < > 15 H 17 H 8 H   WBC, UA  --    < >  --    < > 1 2 5   Bacteria  --    < >  --    < > Rare Negative Negative   Mucus, UA Rare A  --  Rare A  --   --   --   --    Hyaline Casts, UA  --    < >  --    < > 2 A 1 2 A    < > = values in this interval not displayed.         Recent Labs   Lab 10/03/23  2105 10/04/23  0438 10/15/23  0353   POC PH 7.290 LL 7.305 L 7.239 LL   POC PCO2 45.2 H 43.6 37.8   POC HCO3 21.7 L 21.7 L 16.1 L   POC PO2 110 H 92 37 LL   POC SATURATED O2 98 96 60   POC BE -5 -5 -11   Sample ARTERIAL ARTERIAL VENOUS         Microbiology Results (last 7 days)       ** No results found for the last 168 hours. **            ASSESSMENT/PLAN:     ESRD on HD TTS via AVF  Next HD per schedule.  Continue current dialysis prescription.  Renal diet - low K, low phos.  No IVs or BP checks on access and/or non-dominant arm.    Anemia of CKD  Hgb and HCT are acceptable. Monitor for now.  Will provide SHELLEY and/or IV iron PRN.  s/p blood transfusion.    MBD / Secondary HPT  Ca, Phos, PTH and vitamin D levels are acceptable.   Phos binders, vitamin D and analogues, calcimimetics will be given as needed.    HTN  BP seem controlled.   Tolerate asymptomatic HTN up to -160.  Continue home meds.  Low sodium diet.    Chronic abdominal pain  Possible cholangitis  Management per GI and primary team.    Thank you for allowing us to participate in the care of your patient!   We will follow the patient and provide recommendations as needed.    Patient care time was spent personally by me on the following activities:     Obtaining a history.  Examination of  patient.  Providing medical care at the patients bedside.  Developing a treatment plan with patient or surrogate and bedside caregivers.  Ordering and reviewing laboratory studies, radiographic studies, pulse oximetry.  Ordering and performing treatments and interventions.  Evaluation of patient's response to treatment.  Discussions with consultants while on the unit and immediately available to the patient.  Re-evaluation of the patient's condition.  Documentation in the medical record.     Mando Benitez MD    [facility]  [address] [place] [id number]    [phone] - tel  [phone] - fax    12/15/2023

## 2023-12-15 NOTE — SUBJECTIVE & OBJECTIVE
Interval History:  Visited the patient multiple times this morning for severe abdominal pain, patient uncooperative on examination, will not say exactly where the abdominal pain is at, not answering any questions continues to moan in pain.  Unable to have MRCP done.  Discussed with Gastroenterology, we will plan on EUS tomorrow.  Patient has history of multiple hospitalizations with similar presentation, noncompliance.  Hemoglobin stable.  LFTs improving.  Patient found to be sticking her fingers down her throat appearing to make herself vomit.    Review of Systems   Cardiovascular:  Negative for chest pain and palpitations.   Gastrointestinal:  Positive for abdominal pain. Negative for nausea.     Objective:     Vital Signs (Most Recent):  Temp: 97.6 °F (36.4 °C) (12/15/23 0833)  Pulse: 85 (12/15/23 0833)  Resp: 20 (12/15/23 1003)  BP: (!) 143/84 (12/15/23 0957)  SpO2: 100 % (12/15/23 0833) Vital Signs (24h Range):  Temp:  [97.6 °F (36.4 °C)-99.3 °F (37.4 °C)] 97.6 °F (36.4 °C)  Pulse:  [81-89] 85  Resp:  [12-25] 20  SpO2:  [95 %-100 %] 100 %  BP: (118-174)/() 143/84     Weight: 51.9 kg (114 lb 6.7 oz)  Body mass index is 20.93 kg/m².    Intake/Output Summary (Last 24 hours) at 12/15/2023 1545  Last data filed at 12/15/2023 1344  Gross per 24 hour   Intake 0 ml   Output 0 ml   Net 0 ml         Physical Exam  Constitutional:       General: She is in acute distress.   Cardiovascular:      Rate and Rhythm: Normal rate and regular rhythm.   Pulmonary:      Effort: No respiratory distress.      Breath sounds: No wheezing.   Abdominal:      General: Abdomen is flat.      Palpations: Abdomen is soft.      Tenderness: There is abdominal tenderness.   Psychiatric:      Comments: Abnormal behavior, uncooperative.             Significant Labs: All pertinent labs within the past 24 hours have been reviewed.  CBC:   Recent Labs   Lab 12/13/23  1557 12/14/23  0513 12/15/23  0543   WBC 12.77* 14.07* 11.75   HGB 6.7* 8.0*  8.7*   HCT 19.9* 22.9* 25.6*   PLT 95* 90* 85*     CMP:   Recent Labs   Lab 12/13/23  1557 12/14/23  0409 12/15/23  0543    142 139   K 5.4* 5.2* 4.1    103 100   CO2 14* 25 19*   * 130* 113*   BUN 52* 62* 35*   CREATININE 7.4* 7.8* 6.0*   CALCIUM 8.4* 7.9* 9.1   PROT 6.5 6.8 7.4   ALBUMIN 3.0* 3.8 4.0   BILITOT 4.0* 1.4* 1.7*   ALKPHOS 206* 198* 202*   * 103* 48*   * 104* 83*   ANIONGAP 24* 14 20*       Significant Imaging: I have reviewed all pertinent imaging results/findings within the past 24 hours.

## 2023-12-15 NOTE — NURSING
Nurses Note -- 4 Eyes      12/14/2023   11:54 PM      Skin assessed during: Transfer      [x] No Altered Skin Integrity Present    []Prevention Measures Documented      [] Yes- Altered Skin Integrity Present or Discovered   [] LDA Added if Not in Epic (Describe Wound)   [] New Altered Skin Integrity was Present on Admit and Documented in LDA   [] Wound Image Taken    Wound Care Consulted? No    Attending Nurse:  Patti Woo RN/Staff Member:  Patti Dee RN/ Natacha Rose RN

## 2023-12-15 NOTE — PROGRESS NOTES
Unable to complete MRI MRCP. Patient had pannick attack while in MRI Gantry;Pt was crying and refused to do anymore pictures and wanted to get out of machine. Notified RN and Ordering Doctor. 1828pm

## 2023-12-15 NOTE — SUBJECTIVE & OBJECTIVE
Interval History:  continues with abdominal pain.  Liver tests are better today.  Stable BP.    Review of Systems   Constitutional:  Negative for fever.   Respiratory:  Negative for shortness of breath.    Cardiovascular:  Negative for chest pain.     Objective:     Vital Signs (Most Recent):  Temp: 98.5 °F (36.9 °C) (12/14/23 1658)  Pulse: 85 (12/14/23 1658)  Resp: 15 (12/14/23 1658)  BP: (!) 153/78 (12/14/23 1658)  SpO2: 99 % (12/14/23 1658) Vital Signs (24h Range):  Temp:  [97.7 °F (36.5 °C)-99.5 °F (37.5 °C)] 98.5 °F (36.9 °C)  Pulse:  [75-85] 85  Resp:  [11-27] 15  SpO2:  [91 %-100 %] 99 %  BP: (110-159)/(61-89) 153/78     Weight: 59.5 kg (131 lb 2.8 oz)  Body mass index is 23.99 kg/m².    Intake/Output Summary (Last 24 hours) at 12/14/2023 1957  Last data filed at 12/14/2023 1430  Gross per 24 hour   Intake 1430.36 ml   Output 4500 ml   Net -3069.64 ml         Physical Exam  Constitutional:       General: She is not in acute distress.     Appearance: She is not ill-appearing.   Eyes:      General:         Right eye: No discharge.         Left eye: No discharge.   Neck:      Vascular: No JVD.   Cardiovascular:      Rate and Rhythm: Normal rate and regular rhythm.   Pulmonary:      Effort: Pulmonary effort is normal.      Breath sounds: Normal breath sounds.   Abdominal:      General: Abdomen is flat. Bowel sounds are normal. There is no distension.      Palpations: Abdomen is soft.      Tenderness: There is no abdominal tenderness.   Musculoskeletal:      Right lower leg: No edema.      Left lower leg: No edema.   Skin:     General: Skin is warm and moist.      Findings: No rash.   Neurological:      Mental Status: She is alert and oriented to person, place, and time.   Psychiatric:         Attention and Perception: Attention normal.         Mood and Affect: Mood and affect normal.         Speech: Speech normal.             Significant Labs: All pertinent labs within the past 24 hours have been  reviewed.    Significant Imaging: I have reviewed all pertinent imaging results/findings within the past 24 hours.

## 2023-12-15 NOTE — ASSESSMENT & PLAN NOTE
Noted. Patient with suspected gastroparesis vs cholangitis?  Significant lower abdominal discomfort.  Patient has history of multiple admissions, epigastric pain requiring pain meds.  Per reports abdominal imaging in the past have been negative.  GI consult done- possible cholangitis- no fever though. will plan on EUS on 12/16/2023 given patient unable to do MRCP because of panic attack.  NPO at midnight  Continue IVAB empirically. Dc if EUS negative  F/u lactic   Pain med prn-will start with lower dose since pt has history of decompensating

## 2023-12-15 NOTE — HOSPITAL COURSE
Patient with history of ESRD, suspected gastroparesis, sickle cell trait, hypertension, type 2 diabetes admitted for nausea and vomiting, found to be acidotic, anemic, hyperkalemic.  Received dialysis with improvement in electrolytes.  Noted to have worsening abdominal pain, LFTs elevated.  Rapid response was called for low blood pressure and low glucose, there was concern for cholangitis the patient was transferred to [facility].  GI was consulted recommended MRCP.  Patient did not have MRCP done given panic attack.  Patient uncooperative, reports significant abdominal discomfort although will not allow any imaging done.  Discussed with Gastroenterology will plan on EGD with EUS on 12/16/2023.  Nephrology consulted for HD management.  Hematology consulted given concern of sickle cell crisis, per hematology patient has sickle cell trait so unlikely to have crisis at this time.  EGD with EUS done on 12/16/2023 was unremarkable, LFTs resolved.  Gastroenterology recommended conservative management.  Patient's abdominal pain improved.  Patient deemed medically safe for discharge.  Patient has history of multiple hospitalizations for similar issues, repeat imaging in the past has been negative.  Patient instructed to follow up with primary care.  Patient also instructed to follow up with Gastroenterology for further workup of abdominal discomfort.    Physical Exam  Cardiovascular:      Rate and Rhythm: Normal rate and regular rhythm.   Pulmonary:      Effort: No respiratory distress.      Breath sounds: No wheezing.

## 2023-12-15 NOTE — ASSESSMENT & PLAN NOTE
This patient has hyperkalemia which improved with HD.. We will monitor for arrhythmias with EKG or continuous telemetry. We will treat the hyperkalemia with HD. The likely etiology of the hyperkalemia is ESRD.  The patients latest potassium has been reviewed and the results are listed below  Recent Labs   Lab 12/15/23  0543   K 4.1

## 2023-12-15 NOTE — ASSESSMENT & PLAN NOTE
At baseline- continue to monitor    Lab Results   Component Value Date    PLT 85 (L) 12/15/2023

## 2023-12-15 NOTE — PROGRESS NOTES
GASTROENTEROLOGY INPATIENT CONSULT NOTE  Patient Name: Tabby Howard  Patient MRN: [id number]  Patient : 1990    Admit Date: 2023  Service date: 12/15/2023    Reason for Consult: elevated liver enzymes    PCP: Melony Kim NP    Chief Complaint   Patient presents with    Abdominal Pain     Beginning this AM with abdominal distention, dialysis treatment on Saturday       HPI: Patient is a 33 y.o. female with PMHx  sickle cell traits with h/o sickle cell crisis per patient, ESRD, gastroparesis admitted with acute on chronic elevation of liver enzymes.  Had reported pain to [facility] received iv narcotics and was noted to be hypotensive and hypoglycemic.  Remained afebrile.  Transferred to [facility] ICU.  Pt currently receiving HD and has no complaints.     Patient intolerant of MRCP.  She is on the floor and sedated but had been complaining of abdominal pain throughout the day.  Psychiatry has been consulted.  Patient has been inappropriate in terms of questions and history.       Lab Results   Component Value Date    HEPAIGM Negative 2023    HEPBIGM Negative 2023    HEPBCAB Non-reactive 2023    HEPCAB 0.1 2023     Lab Results   Component Value Date    ALT 83 (H) 12/15/2023    AST 48 (H) 12/15/2023    ALKPHOS 202 (H) 12/15/2023    BILITOT 1.7 (H) 12/15/2023     BMP  Lab Results   Component Value Date     12/15/2023    K 4.1 12/15/2023     12/15/2023    CO2 19 (L) 12/15/2023    BUN 35 (H) 12/15/2023    CREATININE 6.0 (H) 12/15/2023    CALCIUM 9.1 12/15/2023    ANIONGAP 20 (H) 12/15/2023    EGFRNORACEVR 8.8 (A) 12/15/2023     Lab Results   Component Value Date    WBC 11.75 12/15/2023    HGB 8.7 (L) 12/15/2023    HCT 25.6 (L) 12/15/2023    MCV 88 12/15/2023    PLT 85 (L) 12/15/2023       US abd  IMPRESSION:  1.  Hepatosplenomegaly.  2.  Decompressed gallbladder without cholelithiasis or sonographic evidence of cholecystitis.  3.  No biliary ductal dilatation.       Latest  Reference Range & Units 22 14:51   Haptoglobin 30 - 250 mg/dL <10 (L)   (L): Data is abnormally low    Past Medical History:  Past Medical History:   Diagnosis Date    ESRD on hemodialysis 2022    Gastritis 2022    EGD was 22    Gastroparesis 2022    has not had Emptying study    Heart failure with preserved ejection fraction 2022    EF 55% on 3/22    History of supraventricular tachycardia     Hyperkalemia 2022    Hypertensive emergency 2022    Sickle cell trait 2022    Type 2 diabetes mellitus         Past Surgical History:  Past Surgical History:   Procedure Laterality Date     SECTION      x 3    COLONOSCOPY      COLONOSCOPY N/A 2022    Procedure: COLONOSCOPY;  Surgeon: Jagdeep Cedeno MD;  Location: [facility];  Service: Endoscopy;  Laterality: N/A;    ESOPHAGOGASTRODUODENOSCOPY N/A 10/18/2019    Procedure: ESOPHAGOGASTRODUODENOSCOPY (EGD);  Surgeon: Gianluca Mendez MD;  Location: [facility];  Service: Endoscopy;  Laterality: N/A;    ESOPHAGOGASTRODUODENOSCOPY N/A 2022    Procedure: EGD (ESOPHAGOGASTRODUODENOSCOPY);  Surgeon: Micky Paredes III, MD;  Location: [facility];  Service: Endoscopy;  Laterality: N/A;    ESOPHAGOGASTRODUODENOSCOPY N/A 2022    Procedure: EGD (ESOPHAGOGASTRODUODENOSCOPY);  Surgeon: Marcelo Zhong MD;  Location: [facility];  Service: Endoscopy;  Laterality: N/A;    INSERTION, CATHETER, TUNNELED N/A 2023    Procedure: Insertion,catheter,tunneled;  Surgeon: Carlos Thurman Jr., MD;  Location: [facility] OR;  Service: General;  Laterality: N/A;    LAPAROSCOPIC CHOLECYSTECTOMY N/A 2022    Procedure: CHOLECYSTECTOMY, LAPAROSCOPIC;  Surgeon: Grey Perez MD;  Location: [facility] OR;  Service: General;  Laterality: N/A;    PLACEMENT OF DUAL-LUMEN VASCULAR CATHETER Left 2022    Procedure: INSERTION-CATHETER-JOSEPH;  Surgeon: Dionte Gan MD;  Location: [facility] OR;  Service: General;  Laterality: Left;     PLACEMENT OF DUAL-LUMEN VASCULAR CATHETER Right 07/26/2022    Procedure: INSERTION-CATHETER-Hemosplit;  Surgeon: Dionte Gan MD;  Location: [facility];  Service: General;  Laterality: Right;        Home Medications:  Facility-Administered Medications Prior to Admission   Medication Dose Route Frequency Provider Last Rate Last Admin    0.9%  NaCl infusion (for blood administration)   Intravenous Q24H PRN Geeta Kaur NP   Stopped at 10/27/23 1035     Medications Prior to Admission   Medication Sig Dispense Refill Last Dose    amLODIPine (NORVASC) 5 MG tablet Take 1 tablet (5 mg total) by mouth once daily. (Patient taking differently: Take 5 mg by mouth once daily.) 30 tablet 1 12/11/2023 at 08:00    apixaban (ELIQUIS) 5 mg Tab Take 1 tablet (5 mg total) by mouth 2 (two) times daily. 180 tablet 1 12/11/2023 at 20:00    carvediloL (COREG) 25 MG tablet Take 1 tablet (25 mg total) by mouth 2 (two) times daily. 60 tablet 5 12/11/2023 at 20:00    cetirizine (ZYRTEC) 10 MG tablet Take 1 tablet (10 mg total) by mouth once daily. 30 tablet 0 12/11/2023 at 08:00    doxycycline (VIBRAMYCIN) 100 MG Cap Take 1 capsule twice a day by oral route for 7 days, for infectoin. (Patient taking differently: Take 100 mg by mouth every 12 (twelve) hours.) 14 capsule 0 12/11/2023 at 20:00    FLUoxetine 20 MG capsule Take 1 capsule (20 mg total) by mouth once daily. 30 capsule 5 12/11/2023 at 08:00    fluticasone propionate (FLONASE ALLERGY RELIEF) 50 mcg/actuation nasal spray Neoga 1 spray every day by intranasal route. (Patient taking differently: 1 spray by Each Nostril route Daily.) 16 g 2 Past Week    gabapentin (NEURONTIN) 300 MG capsule Take 2 capsules twice a day by oral route for 90 days. (Patient taking differently: Take 600 mg by mouth 2 (two) times daily.) 360 capsule 1 12/11/2023 at 20:00    hydrALAZINE (APRESOLINE) 100 MG tablet Take 1 tablet (100 mg total) by mouth 2 (two) times daily. 180 tablet 1 12/11/2023 at 20:00     insulin aspart U-100 (NOVOLOG FLEXPEN U-100 INSULIN) 100 unit/mL (3 mL) InPn pen Inject 8 units 3 times a day by subcutaneous route before meals (Patient taking differently: Inject 8 Units into the skin 3 (three) times daily before meals.) 24 mL 1 12/11/2023 at 20:00    insulin detemir U-100, Levemir, (LEVEMIR FLEXTOUCH U100 INSULIN) 100 unit/mL (3 mL) InPn pen Inject 21 units every day by subcutaneous route in the evening for 90 days. (Patient taking differently: Inject 21 Units into the skin every evening.) 15 mL 1 12/11/2023 at 20:00    isosorbide mononitrate (IMDUR) 30 MG 24 hr tablet Take 1 tablet (30 mg total) by mouth once daily. 90 tablet 1 12/11/2023 at 08:00    levETIRAcetam (KEPPRA) 500 MG Tab Take 1 tablet twice a day by oral route for 30 days. (Patient taking differently: Take 500 mg by mouth 2 (two) times daily.) 60 tablet 2 12/11/2023 at 20:00    ondansetron (ZOFRAN-ODT) 4 MG TbDL Place 1 tablet (4 mg total) under the tongue every 8 (eight) hours. 24 tablet 2 Past Week    oxyCODONE-acetaminophen (PERCOCET) 5-325 mg per tablet TAKE 1 TABLET EVERY 6 HOURS AS NEEDED FOP PAIN (Patient taking differently: Take 1 tablet by mouth every 6 (six) hours as needed for Pain.) 15 tablet 0 Past Month    pantoprazole (PROTONIX) 40 MG tablet Take 1 tablet (40 mg total) by mouth once daily. 90 tablet 1 12/11/2023 at 08:00    promethazine (PHENERGAN) 25 MG tablet Take 1 tablet (25 mg total) by mouth every 6 (six) hours as needed for 5 days. (Patient taking differently: Take 25 mg by mouth every 6 (six) hours as needed for Nausea.) 20 tablet 2 Past Month    sevelamer carbonate (RENVELA) 800 mg Tab Take 2 tablets with meals three times a day (Patient taking differently: Take 1,600 mg by mouth 3 (three) times daily with meals.) 180 tablet 11 12/11/2023 at 20:00    sucralfate (CARAFATE) 100 mg/mL suspension Take 10 mL 4 times a day by oral route before meals for 30 days. (Patient taking differently: Take 1 g by mouth 4  (four) times daily with meals and nightly.) 1200 mL 1 12/11/2023 at 20:00    amLODIPine (NORVASC) 5 MG tablet Take 1 tablet every day by oral route for 90 days. 90 tablet 1     blood sugar diagnostic (TRUE METRIX GLUCOSE TEST STRIP) Strp Use 1 strip to check blood glucose three times daily 100 each 11     blood-glucose meter (TRUE METRIX GLUCOSE METER) Misc Use to check blood glucose 1 each 0     brimonidine 0.2% (ALPHAGAN) 0.2 % Drop Place 1 drop in right eye twice daily. 10 mL 5     carvediloL (COREG) 25 MG tablet Take 1 tablet (25 mg total) by mouth 2 (two) times daily. 60 tablet 2     ciprofloxacin HCl (CILOXAN) 0.3 % ophthalmic solution instill 1 drop into the right eye 4 times a day for 30 days 5 mL 0     FLUoxetine 20 MG capsule Take 1 capsule (20 mg total) by mouth once daily. 90 capsule 1     gabapentin (NEURONTIN) 300 MG capsule Take 2 capsules (600 mg total) by mouth 2 (two) times daily. 360 capsule 1     insulin aspart U-100 (NOVOLOG FLEXPEN U-100 INSULIN) 100 unit/mL (3 mL) InPn pen Inject 10 units 3 times a day by subcutaneous route before meals for 90 days. 27 mL 2     insulin detemir U-100, Levemir, (LEVEMIR FLEXTOUCH U100 INSULIN) 100 unit/mL (3 mL) InPn pen Inject 18 units every day by subcutaneous route in the evening 18 mL 1     insulin detemir U-100, Levemir, (LEVEMIR FLEXTOUCH U100 INSULIN) 100 unit/mL (3 mL) InPn pen Inject 22 units every day by subcutaneous route in the evening for 90 days. 18 mL 1     isosorbide mononitrate (IMDUR) 30 MG 24 hr tablet Take 1 tablet (30 mg total) by mouth once daily. 30 tablet 1     ketorolac 0.5% (ACULAR) 0.5 % Drop Instill 1 drop into the right eye 4 times a day for 30 days 3 mL 5     lancets (TRUEPLUS LANCETS) 33 gauge Misc Use 1 to check blood glucose three times daily 100 each 11     lancets 33 gauge Misc Use to test twice daily 100 each 5     levETIRAcetam (KEPPRA) 500 MG Tab Take 1 tablet (500 mg total) by mouth 2 (two) times daily. 60 tablet 2      "ondansetron (ZOFRAN-ODT) 4 MG TbDL Dissolve 1 tablet (4 mg total) by mouth every 8 (eight) hours. 24 tablet 2     pen needle, diabetic 31 gauge x 3/16" Ndle Use as directed to inject insulin 5 times daily 100 each 11     prednisoLONE acetate (PRED FORTE) 1 % DrpS Instill 1 drops into the right eye 4 times a day for 30 days 5 mL 1     promethazine (PHENERGAN) 25 MG suppository Place 1 suppository (25 mg total) rectally every 6 (six) hours as needed for Nausea. 10 suppository 0     sucroferric oxyhydroxide (VELPHORO) 500 mg Chew Take 1 tablet 3 times a day (Patient taking differently: Take 1 tablet by mouth 3 (three) times daily.) 90 tablet 6     timolol maleate 0.5% (TIMOPTIC) 0.5 % Drop Instill 1 drop into the right eye 2 times a day for 30 days 5 mL 6        Inpatient Medications:   amLODIPine  5 mg Oral Daily    carvediloL  25 mg Oral BID    epoetin teresa-epbx  100 Units/kg Subcutaneous Every Tues, Thurs, Sat    FLUoxetine  20 mg Oral Daily    gabapentin  600 mg Oral BID    hydrALAZINE  100 mg Oral BID    insulin aspart U-100  8 Units Subcutaneous TID AC    insulin detemir U-100 (Levemir)  21 Units Subcutaneous QHS    isosorbide mononitrate  30 mg Oral Daily    levETIRAcetam  500 mg Oral BID    meropenem (MERREM) IVPB  500 mg Intravenous Q24H    mupirocin   Nasal BID    polyethylene glycol  17 g Oral Daily     acetaminophen, albuterol-ipratropium, aluminum-magnesium hydroxide-simethicone, dextrose 10%, dextrose 10%, dextrose 50%, dextrose 50%, glucagon (human recombinant), glucose, glucose, haloperidol lactate, insulin aspart U-100, melatonin, morphine, naloxone, ondansetron, prochlorperazine, simethicone, sodium chloride 0.9%    Review of patient's allergies indicates:   Allergen Reactions    Penicillins Hives       Social History:   Social History     Occupational History    Not on file   Tobacco Use    Smoking status: Never    Smokeless tobacco: Never   Substance and Sexual Activity    Alcohol use: Not Currently " "   Drug use: No    Sexual activity: Not Currently     Partners: Male     Birth control/protection: I.U.D.       Family History:   Family History   Problem Relation Age of Onset    Diabetes Mother     Diabetes Father        Review of Systems:  A 10 point review of systems was performed and was normal, except as mentioned in the HPI, including constitutional, HEENT, heme, lymph, cardiovascular, respiratory, gastrointestinal, genitourinary, neurologic, endocrine, psychiatric and musculoskeletal.      OBJECTIVE:    Physical Exam:  24 Hour Vital Sign Ranges: Temp:  [97.6 °F (36.4 °C)-99.3 °F (37.4 °C)] 97.6 °F (36.4 °C)  Pulse:  [81-89] 85  Resp:  [15-25] 20  SpO2:  [95 %-100 %] 100 %  BP: (118-174)/() 143/84  Most recent vitals: BP (!) 143/84   Pulse 85   Temp 97.6 °F (36.4 °C) (Axillary)   Resp 20   Ht 5' 2" (1.575 m)   Wt 51.9 kg (114 lb 6.7 oz)   SpO2 100%   Breastfeeding No   BMI 20.93 kg/m²    GEN: well-developed, well-nourished, awake and alert, non-toxic appearing adult  HEENT: PERRL, sclera anicteric, oral mucosa pink and moist without lesion  NECK: trachea midline; Good ROM  CV: regular rate and rhythm, no murmurs or gallops  RESP: clear to auscultation bilaterally, no wheezes, rhonci or rales  ABD: soft, non-tender, non-distended, normal bowel sounds  EXT: no swelling or edema, 2+ pulses distally  SKIN: no rashes or jaundice  PSYCH: normal affect    Labs:   Recent Labs     12/13/23  1557 12/14/23  0513 12/15/23  0543   WBC 12.77* 14.07* 11.75   MCV 86 85 88   PLT 95* 90* 85*       Recent Labs     12/13/23  1557 12/14/23  0409 12/15/23  0543    142 139   K 5.4* 5.2* 4.1    103 100   CO2 14* 25 19*   BUN 52* 62* 35*   * 130* 113*       No results for input(s): "ALB" in the last 72 hours.    Invalid input(s): "ALKP", "SGOT", "SGPT", "TBIL", "DBIL", "TPRO"  Recent Labs     12/13/23  1517   INR 1.3*           Radiology Review:  US Pelvis Complete Non OB   Final Result      US " Abdomen Complete   Final Result        EGD 2022 normal  Colon 2022- poor visualization    IMPRESSION / RECOMMENDATIONS:  33 F h/o sickle cell with chronically elevated LFTS with acute exacerbation in setting of dropping hgb.  H/h has been low for years requiring blood transfusions with past low haptoglobin.  Overall suspect this might be sickle cell crisis type picture with hemolytic anemia.  CBD normal caliber and s/p CCY.  Last echo without significant pericardial effusion making congestive hepatopathy less likely.  Negative hep panel earlier this year  -recommend repeat TTE and hematology consultation  -    Patient intolerant of MRCP.  With continuing abdominal pain complaints we will schedule endoscopic ultrasound in a.m.    Thank you for this consult.    Aylin Sainz  12/15/2023  1:38 PM

## 2023-12-16 ENCOUNTER — ANESTHESIA (OUTPATIENT)
Dept: SURGERY | Facility: HOSPITAL | Age: 33
DRG: 391 | End: 2023-12-16
Payer: MEDICAID

## 2023-12-16 ENCOUNTER — ANESTHESIA EVENT (OUTPATIENT)
Dept: SURGERY | Facility: HOSPITAL | Age: 33
DRG: 391 | End: 2023-12-16
Payer: MEDICAID

## 2023-12-16 VITALS
HEART RATE: 70 BPM | SYSTOLIC BLOOD PRESSURE: 121 MMHG | OXYGEN SATURATION: 97 % | RESPIRATION RATE: 14 BRPM | DIASTOLIC BLOOD PRESSURE: 64 MMHG

## 2023-12-16 LAB
ALBUMIN SERPL BCP-MCNC: 3.6 G/DL (ref 3.5–5.2)
ALP SERPL-CCNC: 173 U/L (ref 55–135)
ALT SERPL W/O P-5'-P-CCNC: 57 U/L (ref 10–44)
ANION GAP SERPL CALC-SCNC: 12 MMOL/L (ref 8–16)
ANISOCYTOSIS BLD QL SMEAR: ABNORMAL
AST SERPL-CCNC: 29 U/L (ref 10–40)
BASOPHILS # BLD AUTO: 0.05 K/UL (ref 0–0.2)
BASOPHILS NFR BLD: 0.6 % (ref 0–1.9)
BILIRUB SERPL-MCNC: 1.6 MG/DL (ref 0.1–1)
BUN SERPL-MCNC: 60 MG/DL (ref 6–20)
CALCIUM SERPL-MCNC: 8.5 MG/DL (ref 8.7–10.5)
CHLORIDE SERPL-SCNC: 103 MMOL/L (ref 95–110)
CO2 SERPL-SCNC: 24 MMOL/L (ref 23–29)
CREAT SERPL-MCNC: 8.4 MG/DL (ref 0.5–1.4)
DACRYOCYTES BLD QL SMEAR: ABNORMAL
DIFFERENTIAL METHOD: ABNORMAL
EOSINOPHIL # BLD AUTO: 0.1 K/UL (ref 0–0.5)
EOSINOPHIL NFR BLD: 1.6 % (ref 0–8)
ERYTHROCYTE [DISTWIDTH] IN BLOOD BY AUTOMATED COUNT: 21.2 % (ref 11.5–14.5)
EST. GFR  (NO RACE VARIABLE): 5.9 ML/MIN/1.73 M^2
FERRITIN SERPL-MCNC: 6013.5 NG/ML (ref 20–300)
GLUCOSE SERPL-MCNC: 183 MG/DL (ref 70–110)
GLUCOSE SERPL-MCNC: 54 MG/DL (ref 70–110)
GLUCOSE SERPL-MCNC: 59 MG/DL (ref 70–110)
GLUCOSE SERPL-MCNC: 91 MG/DL (ref 70–110)
HAV IGM SERPL QL IA: NEGATIVE
HBV CORE IGM SERPL QL IA: NEGATIVE
HBV SURFACE AG SERPL QL IA: NEGATIVE
HCT VFR BLD AUTO: 25.5 % (ref 37–48.5)
HCV AB S/CO SERPL IA: NON REACTIVE
HCV AB SERPL QL IA: NORMAL
HGB BLD-MCNC: 8.5 G/DL (ref 12–16)
HYPOCHROMIA BLD QL SMEAR: ABNORMAL
IMM GRANULOCYTES # BLD AUTO: 0.1 K/UL (ref 0–0.04)
IMM GRANULOCYTES NFR BLD AUTO: 1.1 % (ref 0–0.5)
IRON SERPL-MCNC: 175 UG/DL (ref 30–160)
LACTATE SERPL-SCNC: 0.4 MMOL/L (ref 0.5–1.9)
LYMPHOCYTES # BLD AUTO: 1.4 K/UL (ref 1–4.8)
LYMPHOCYTES NFR BLD: 15.3 % (ref 18–48)
MAGNESIUM SERPL-MCNC: 2.3 MG/DL (ref 1.6–2.6)
MCH RBC QN AUTO: 29.5 PG (ref 27–31)
MCHC RBC AUTO-ENTMCNC: 33.3 G/DL (ref 32–36)
MCV RBC AUTO: 89 FL (ref 82–98)
MONOCYTES # BLD AUTO: 1.2 K/UL (ref 0.3–1)
MONOCYTES NFR BLD: 13.7 % (ref 4–15)
NEUTROPHILS # BLD AUTO: 6.1 K/UL (ref 1.8–7.7)
NEUTROPHILS NFR BLD: 67.7 % (ref 38–73)
NRBC BLD-RTO: 13 /100 WBC
PHOSPHATE SERPL-MCNC: 7.1 MG/DL (ref 2.7–4.5)
PLATELET # BLD AUTO: 60 K/UL (ref 150–450)
PLATELET BLD QL SMEAR: ABNORMAL
PMV BLD AUTO: 8.4 FL (ref 9.2–12.9)
POIKILOCYTOSIS BLD QL SMEAR: SLIGHT
POTASSIUM SERPL-SCNC: 4.6 MMOL/L (ref 3.5–5.1)
PROT SERPL-MCNC: 6.9 G/DL (ref 6–8.4)
RBC # BLD AUTO: 2.88 M/UL (ref 4–5.4)
SATURATED IRON: 97 % (ref 20–50)
SODIUM SERPL-SCNC: 139 MMOL/L (ref 136–145)
TOTAL IRON BINDING CAPACITY: 181 UG/DL (ref 250–450)
TRANSFERRIN SERPL-MCNC: 129 MG/DL (ref 200–375)
WBC # BLD AUTO: 9.03 K/UL (ref 3.9–12.7)

## 2023-12-16 PROCEDURE — 25000003 PHARM REV CODE 250: Performed by: NURSE ANESTHETIST, CERTIFIED REGISTERED

## 2023-12-16 PROCEDURE — 84466 ASSAY OF TRANSFERRIN: CPT | Performed by: STUDENT IN AN ORGANIZED HEALTH CARE EDUCATION/TRAINING PROGRAM

## 2023-12-16 PROCEDURE — D9220A PRA ANESTHESIA: Mod: ANES,,, | Performed by: ANESTHESIOLOGY

## 2023-12-16 PROCEDURE — 37000008 HC ANESTHESIA 1ST 15 MINUTES: Performed by: INTERNAL MEDICINE

## 2023-12-16 PROCEDURE — 94761 N-INVAS EAR/PLS OXIMETRY MLT: CPT

## 2023-12-16 PROCEDURE — 63600175 PHARM REV CODE 636 W HCPCS: Mod: JZ | Performed by: HOSPITALIST

## 2023-12-16 PROCEDURE — 25000003 PHARM REV CODE 250: Performed by: STUDENT IN AN ORGANIZED HEALTH CARE EDUCATION/TRAINING PROGRAM

## 2023-12-16 PROCEDURE — D9220A PRA ANESTHESIA: Mod: CRNA,,, | Performed by: NURSE ANESTHETIST, CERTIFIED REGISTERED

## 2023-12-16 PROCEDURE — 63600175 PHARM REV CODE 636 W HCPCS: Performed by: NURSE ANESTHETIST, CERTIFIED REGISTERED

## 2023-12-16 PROCEDURE — 25000003 PHARM REV CODE 250: Performed by: HOSPITALIST

## 2023-12-16 PROCEDURE — 36415 COLL VENOUS BLD VENIPUNCTURE: CPT | Performed by: STUDENT IN AN ORGANIZED HEALTH CARE EDUCATION/TRAINING PROGRAM

## 2023-12-16 PROCEDURE — 85025 COMPLETE CBC W/AUTO DIFF WBC: CPT | Performed by: HOSPITALIST

## 2023-12-16 PROCEDURE — 83735 ASSAY OF MAGNESIUM: CPT | Performed by: HOSPITALIST

## 2023-12-16 PROCEDURE — 80053 COMPREHEN METABOLIC PANEL: CPT | Performed by: HOSPITALIST

## 2023-12-16 PROCEDURE — 43237 ENDOSCOPIC US EXAM ESOPH: CPT | Performed by: INTERNAL MEDICINE

## 2023-12-16 PROCEDURE — 99900035 HC TECH TIME PER 15 MIN (STAT)

## 2023-12-16 PROCEDURE — 80100016 HC MAINTENANCE HEMODIALYSIS

## 2023-12-16 PROCEDURE — 83605 ASSAY OF LACTIC ACID: CPT | Performed by: STUDENT IN AN ORGANIZED HEALTH CARE EDUCATION/TRAINING PROGRAM

## 2023-12-16 PROCEDURE — 99900031 HC PATIENT EDUCATION (STAT)

## 2023-12-16 PROCEDURE — 84100 ASSAY OF PHOSPHORUS: CPT | Performed by: HOSPITALIST

## 2023-12-16 PROCEDURE — D9220A PRA ANESTHESIA: ICD-10-PCS | Mod: ANES,,, | Performed by: ANESTHESIOLOGY

## 2023-12-16 PROCEDURE — D9220A PRA ANESTHESIA: ICD-10-PCS | Mod: CRNA,,, | Performed by: NURSE ANESTHETIST, CERTIFIED REGISTERED

## 2023-12-16 PROCEDURE — 12000002 HC ACUTE/MED SURGE SEMI-PRIVATE ROOM

## 2023-12-16 PROCEDURE — 82728 ASSAY OF FERRITIN: CPT | Performed by: STUDENT IN AN ORGANIZED HEALTH CARE EDUCATION/TRAINING PROGRAM

## 2023-12-16 PROCEDURE — 63600175 PHARM REV CODE 636 W HCPCS: Performed by: STUDENT IN AN ORGANIZED HEALTH CARE EDUCATION/TRAINING PROGRAM

## 2023-12-16 PROCEDURE — 83540 ASSAY OF IRON: CPT | Performed by: STUDENT IN AN ORGANIZED HEALTH CARE EDUCATION/TRAINING PROGRAM

## 2023-12-16 PROCEDURE — 37000009 HC ANESTHESIA EA ADD 15 MINS: Performed by: INTERNAL MEDICINE

## 2023-12-16 RX ORDER — PROPOFOL 10 MG/ML
VIAL (ML) INTRAVENOUS
Status: DISCONTINUED | OUTPATIENT
Start: 2023-12-16 | End: 2023-12-16

## 2023-12-16 RX ADMIN — PROPOFOL 40 MG: 10 INJECTION, EMULSION INTRAVENOUS at 10:12

## 2023-12-16 RX ADMIN — DEXTROSE MONOHYDRATE 25 G: 25 INJECTION, SOLUTION INTRAVENOUS at 06:12

## 2023-12-16 RX ADMIN — SODIUM CHLORIDE: 9 INJECTION, SOLUTION INTRAVENOUS at 10:12

## 2023-12-16 RX ADMIN — GABAPENTIN 600 MG: 300 CAPSULE ORAL at 08:12

## 2023-12-16 RX ADMIN — MORPHINE SULFATE 2 MG: 2 INJECTION, SOLUTION INTRAMUSCULAR; INTRAVENOUS at 12:12

## 2023-12-16 RX ADMIN — LEVETIRACETAM 500 MG: 500 TABLET, FILM COATED ORAL at 08:12

## 2023-12-16 RX ADMIN — LEVETIRACETAM 500 MG: 500 TABLET, FILM COATED ORAL at 11:12

## 2023-12-16 RX ADMIN — HYDRALAZINE HYDROCHLORIDE 100 MG: 25 TABLET, FILM COATED ORAL at 08:12

## 2023-12-16 RX ADMIN — FLUOXETINE 20 MG: 20 CAPSULE ORAL at 11:12

## 2023-12-16 RX ADMIN — MUPIROCIN 1 G: 20 OINTMENT TOPICAL at 11:12

## 2023-12-16 RX ADMIN — GABAPENTIN 600 MG: 300 CAPSULE ORAL at 11:12

## 2023-12-16 RX ADMIN — ONDANSETRON 8 MG: 4 TABLET, ORALLY DISINTEGRATING ORAL at 07:12

## 2023-12-16 RX ADMIN — CARVEDILOL 25 MG: 25 TABLET, FILM COATED ORAL at 08:12

## 2023-12-16 RX ADMIN — APIXABAN 5 MG: 5 TABLET, FILM COATED ORAL at 08:12

## 2023-12-16 RX ADMIN — EPOETIN ALFA-EPBX 6100 UNITS: 10000 INJECTION, SOLUTION INTRAVENOUS; SUBCUTANEOUS at 02:12

## 2023-12-16 RX ADMIN — PROPOFOL 80 MG: 10 INJECTION, EMULSION INTRAVENOUS at 10:12

## 2023-12-16 NOTE — ASSESSMENT & PLAN NOTE
Patient's FSGs are controlled on current medication regimen. Pt did have episode of hypoglycemia which has resolved.   Last A1c reviewed-   Lab Results   Component Value Date    HGBA1C 5.8 08/01/2023     Most recent fingerstick glucose reviewed-   POC Glucose   Date Value Ref Range Status   12/16/2023 183 (H) 70 - 110 Final   12/16/2023 59 (L) 70 - 110 Final   12/15/2023 127 (H) 70 - 110 Final         Current correctional scale  Medium  Maintain anti-hyperglycemic dose as follows-   Antihyperglycemics (From admission, onward)    Start     Stop Route Frequency Ordered    12/13/23 0645  insulin aspart U-100 pen 8 Units         -- SubQ 3 times daily before meals 12/12/23 1728    12/12/23 2100  insulin detemir U-100 (Levemir) pen 21 Units         -- SubQ Nightly 12/12/23 1728    12/12/23 1728  insulin aspart U-100 pen 1-10 Units         -- SubQ Before meals & nightly PRN 12/12/23 1728        Hold Oral hypoglycemics while patient is in the hospital.

## 2023-12-16 NOTE — SUBJECTIVE & OBJECTIVE
Interval History:  Patient initially somnolent on examination, after waking up she starts to cry and complain of abdominal pain.  Plan for EUS with Gastroenterology today.  Plan for HD today.  Lactic 0.4.     Review of Systems   Cardiovascular:  Negative for chest pain and palpitations.   Gastrointestinal:  Positive for abdominal pain. Negative for nausea.   Psychiatric/Behavioral:  Positive for agitation.      Objective:     Vital Signs (Most Recent):  Temp: 98.4 °F (36.9 °C) (12/16/23 0557)  Pulse: 80 (12/16/23 0732)  Resp: 18 (12/16/23 0732)  BP: 109/63 (12/16/23 0557)  SpO2: 95 % (12/16/23 0732) Vital Signs (24h Range):  Temp:  [98.4 °F (36.9 °C)-98.7 °F (37.1 °C)] 98.4 °F (36.9 °C)  Pulse:  [75-85] 80  Resp:  [18-20] 18  SpO2:  [93 %-97 %] 95 %  BP: ()/(58-84) 109/63     Weight: 51.9 kg (114 lb 6.7 oz)  Body mass index is 20.93 kg/m².    Intake/Output Summary (Last 24 hours) at 12/16/2023 0952  Last data filed at 12/15/2023 1747  Gross per 24 hour   Intake 0 ml   Output 0 ml   Net 0 ml         Physical Exam  Constitutional:       General: She is in acute distress.   Cardiovascular:      Rate and Rhythm: Normal rate and regular rhythm.   Pulmonary:      Effort: No respiratory distress.      Breath sounds: No wheezing.   Abdominal:      General: Abdomen is flat.      Palpations: Abdomen is soft.      Tenderness: There is abdominal tenderness.   Psychiatric:      Comments: Abnormal behavior, uncooperative.             Significant Labs: All pertinent labs within the past 24 hours have been reviewed.  CBC:   Recent Labs   Lab 12/15/23  0543 12/16/23  0540   WBC 11.75 9.03   HGB 8.7* 8.5*   HCT 25.6* 25.5*   PLT 85* 60*     CMP:   Recent Labs   Lab 12/15/23  0543 12/16/23  0540    139   K 4.1 4.6    103   CO2 19* 24   * 54*   BUN 35* 60*   CREATININE 6.0* 8.4*   CALCIUM 9.1 8.5*   PROT 7.4 6.9   ALBUMIN 4.0 3.6   BILITOT 1.7* 1.6*   ALKPHOS 202* 173*   AST 48* 29   ALT 83* 57*   ANIONGAP 20*  12       Significant Imaging: I have reviewed all pertinent imaging results/findings within the past 24 hours.

## 2023-12-16 NOTE — PROGRESS NOTES
Nephrology Consult Note        Patient Name: Tabby Howard  MRN: [id number]    Patient Class: IP- Inpatient   Admission Date: 2023  Length of Stay: 3 days  Date of Service: 2023    Attending Physician: Donavon Bailey MD  Primary Care Provider: Melony Kim NP    Reason for Consult: esrd    SUBJECTIVE:     HPI: 33F ESRD on HD TTS by Dr. Figueroa, known gastroparesis and chronic pain, sickle cell trait, hypertension, pericardial effusion, type 2 diabetes admitted at [facility] for n/v and was found to be in acidosis, anemic and hyperkalemic- HD was done and 2 units were tranfused. Her abdominal pain got worse, LFTs went up and she had RRT called for lethargy, low BP, low BG. Due to concern of cholangitis, she was transferred to [facility] on  for possible MRCP. Will need HD today or tomorrow.    12/15 VSS. Could not tolerate MRCP due to panic attack. Tolerated HD yesterday. Next HD tomorrow. Got total 2 PRBC so far.   VSS. Seen on HD today, tolerating well. Appreciate heme/Onc and GI input about anemia. Will continue current plan with transfusions acutely for Hgb < 7-8 and high dose Epo with HD. I think we would all agree taht she does not have iron deficient anemia with ferritin > 6,000...    Past Medical History:   Diagnosis Date    ESRD on hemodialysis 2022    Gastritis 2022    EGD was 22    Gastroparesis 2022    has not had Emptying study    Heart failure with preserved ejection fraction 2022    EF 55% on 3/22    History of supraventricular tachycardia     Hyperkalemia 2022    Hypertensive emergency 2022    Sickle cell trait 2022    Type 2 diabetes mellitus      Past Surgical History:   Procedure Laterality Date     SECTION      x 3    COLONOSCOPY      COLONOSCOPY N/A 2022    Procedure: COLONOSCOPY;  Surgeon: Jagdeep Cedeno MD;  Location: [facility];  Service: Endoscopy;  Laterality: N/A;    ESOPHAGOGASTRODUODENOSCOPY N/A 10/18/2019     Procedure: ESOPHAGOGASTRODUODENOSCOPY (EGD);  Surgeon: Gianluca Mendez MD;  Location: [facility] ENDO;  Service: Endoscopy;  Laterality: N/A;    ESOPHAGOGASTRODUODENOSCOPY N/A 08/24/2022    Procedure: EGD (ESOPHAGOGASTRODUODENOSCOPY);  Surgeon: Micky Paredes III, MD;  Location: [facility] ENDO;  Service: Endoscopy;  Laterality: N/A;    ESOPHAGOGASTRODUODENOSCOPY N/A 12/5/2022    Procedure: EGD (ESOPHAGOGASTRODUODENOSCOPY);  Surgeon: Marcelo Zhong MD;  Location: [facility] ENDO;  Service: Endoscopy;  Laterality: N/A;    INSERTION, CATHETER, TUNNELED N/A 6/17/2023    Procedure: Insertion,catheter,tunneled;  Surgeon: Carlos Thurman Jr., MD;  Location: [facility] OR;  Service: General;  Laterality: N/A;    LAPAROSCOPIC CHOLECYSTECTOMY N/A 07/30/2022    Procedure: CHOLECYSTECTOMY, LAPAROSCOPIC;  Surgeon: Grey Perez MD;  Location: [facility] OR;  Service: General;  Laterality: N/A;    PLACEMENT OF DUAL-LUMEN VASCULAR CATHETER Left 07/12/2022    Procedure: INSERTION-CATHETER-JOSEPH;  Surgeon: Dionte Gan MD;  Location: [facility] OR;  Service: General;  Laterality: Left;    PLACEMENT OF DUAL-LUMEN VASCULAR CATHETER Right 07/26/2022    Procedure: INSERTION-CATHETER-Hemosplit;  Surgeon: Dionte Gan MD;  Location: [facility] OR;  Service: General;  Laterality: Right;     Family History   Problem Relation Age of Onset    Diabetes Mother     Diabetes Father      Social History     Tobacco Use    Smoking status: Never    Smokeless tobacco: Never   Substance Use Topics    Alcohol use: Not Currently    Drug use: No       Review of patient's allergies indicates:   Allergen Reactions    Penicillins Hives       Outpatient meds:  No current facility-administered medications on file prior to encounter.     Current Outpatient Medications on File Prior to Encounter   Medication Sig Dispense Refill    amLODIPine (NORVASC) 5 MG tablet Take 1 tablet (5 mg total) by mouth once daily. (Patient taking differently: Take 5 mg by mouth once daily.) 30  tablet 1    apixaban (ELIQUIS) 5 mg Tab Take 1 tablet (5 mg total) by mouth 2 (two) times daily. 180 tablet 1    carvediloL (COREG) 25 MG tablet Take 1 tablet (25 mg total) by mouth 2 (two) times daily. 60 tablet 5    cetirizine (ZYRTEC) 10 MG tablet Take 1 tablet (10 mg total) by mouth once daily. 30 tablet 0    doxycycline (VIBRAMYCIN) 100 MG Cap Take 1 capsule twice a day by oral route for 7 days, for infectoin. (Patient taking differently: Take 100 mg by mouth every 12 (twelve) hours.) 14 capsule 0    FLUoxetine 20 MG capsule Take 1 capsule (20 mg total) by mouth once daily. 30 capsule 5    fluticasone propionate (FLONASE ALLERGY RELIEF) 50 mcg/actuation nasal spray [place] 1 spray every day by intranasal route. (Patient taking differently: 1 spray by Each Nostril route Daily.) 16 g 2    gabapentin (NEURONTIN) 300 MG capsule Take 2 capsules twice a day by oral route for 90 days. (Patient taking differently: Take 600 mg by mouth 2 (two) times daily.) 360 capsule 1    hydrALAZINE (APRESOLINE) 100 MG tablet Take 1 tablet (100 mg total) by mouth 2 (two) times daily. 180 tablet 1    insulin aspart U-100 (NOVOLOG FLEXPEN U-100 INSULIN) 100 unit/mL (3 mL) InPn pen Inject 8 units 3 times a day by subcutaneous route before meals (Patient taking differently: Inject 8 Units into the skin 3 (three) times daily before meals.) 24 mL 1    insulin detemir U-100, Levemir, (LEVEMIR FLEXTOUCH U100 INSULIN) 100 unit/mL (3 mL) InPn pen Inject 21 units every day by subcutaneous route in the evening for 90 days. (Patient taking differently: Inject 21 Units into the skin every evening.) 15 mL 1    isosorbide mononitrate (IMDUR) 30 MG 24 hr tablet Take 1 tablet (30 mg total) by mouth once daily. 90 tablet 1    levETIRAcetam (KEPPRA) 500 MG Tab Take 1 tablet twice a day by oral route for 30 days. (Patient taking differently: Take 500 mg by mouth 2 (two) times daily.) 60 tablet 2    ondansetron (ZOFRAN-ODT) 4 MG TbDL Place 1 tablet (4 mg  total) under the tongue every 8 (eight) hours. 24 tablet 2    oxyCODONE-acetaminophen (PERCOCET) 5-325 mg per tablet TAKE 1 TABLET EVERY 6 HOURS AS NEEDED FOP PAIN (Patient taking differently: Take 1 tablet by mouth every 6 (six) hours as needed for Pain.) 15 tablet 0    pantoprazole (PROTONIX) 40 MG tablet Take 1 tablet (40 mg total) by mouth once daily. 90 tablet 1    promethazine (PHENERGAN) 25 MG tablet Take 1 tablet (25 mg total) by mouth every 6 (six) hours as needed for 5 days. (Patient taking differently: Take 25 mg by mouth every 6 (six) hours as needed for Nausea.) 20 tablet 2    sevelamer carbonate (RENVELA) 800 mg Tab Take 2 tablets with meals three times a day (Patient taking differently: Take 1,600 mg by mouth 3 (three) times daily with meals.) 180 tablet 11    sucralfate (CARAFATE) 100 mg/mL suspension Take 10 mL 4 times a day by oral route before meals for 30 days. (Patient taking differently: Take 1 g by mouth 4 (four) times daily with meals and nightly.) 1200 mL 1    amLODIPine (NORVASC) 5 MG tablet Take 1 tablet every day by oral route for 90 days. 90 tablet 1    blood sugar diagnostic (TRUE METRIX GLUCOSE TEST STRIP) Strp Use 1 strip to check blood glucose three times daily 100 each 11    blood-glucose meter (TRUE METRIX GLUCOSE METER) Misc Use to check blood glucose 1 each 0    brimonidine 0.2% (ALPHAGAN) 0.2 % Drop Place 1 drop in right eye twice daily. 10 mL 5    carvediloL (COREG) 25 MG tablet Take 1 tablet (25 mg total) by mouth 2 (two) times daily. 60 tablet 2    ciprofloxacin HCl (CILOXAN) 0.3 % ophthalmic solution instill 1 drop into the right eye 4 times a day for 30 days 5 mL 0    FLUoxetine 20 MG capsule Take 1 capsule (20 mg total) by mouth once daily. 90 capsule 1    gabapentin (NEURONTIN) 300 MG capsule Take 2 capsules (600 mg total) by mouth 2 (two) times daily. 360 capsule 1    insulin aspart U-100 (NOVOLOG FLEXPEN U-100 INSULIN) 100 unit/mL (3 mL) InPn pen Inject 10 units 3 times  "a day by subcutaneous route before meals for 90 days. 27 mL 2    insulin detemir U-100, Levemir, (LEVEMIR FLEXTOUCH U100 INSULIN) 100 unit/mL (3 mL) InPn pen Inject 18 units every day by subcutaneous route in the evening 18 mL 1    insulin detemir U-100, Levemir, (LEVEMIR FLEXTOUCH U100 INSULIN) 100 unit/mL (3 mL) InPn pen Inject 22 units every day by subcutaneous route in the evening for 90 days. 18 mL 1    isosorbide mononitrate (IMDUR) 30 MG 24 hr tablet Take 1 tablet (30 mg total) by mouth once daily. 30 tablet 1    ketorolac 0.5% (ACULAR) 0.5 % Drop Instill 1 drop into the right eye 4 times a day for 30 days 3 mL 5    lancets (TRUEPLUS LANCETS) 33 gauge Misc Use 1 to check blood glucose three times daily 100 each 11    lancets 33 gauge Misc Use to test twice daily 100 each 5    levETIRAcetam (KEPPRA) 500 MG Tab Take 1 tablet (500 mg total) by mouth 2 (two) times daily. 60 tablet 2    ondansetron (ZOFRAN-ODT) 4 MG TbDL Dissolve 1 tablet (4 mg total) by mouth every 8 (eight) hours. 24 tablet 2    pen needle, diabetic 31 gauge x 3/16" Ndle Use as directed to inject insulin 5 times daily 100 each 11    prednisoLONE acetate (PRED FORTE) 1 % DrpS Instill 1 drops into the right eye 4 times a day for 30 days 5 mL 1    promethazine (PHENERGAN) 25 MG suppository Place 1 suppository (25 mg total) rectally every 6 (six) hours as needed for Nausea. 10 suppository 0    sucroferric oxyhydroxide (VELPHORO) 500 mg Chew Take 1 tablet 3 times a day (Patient taking differently: Take 1 tablet by mouth 3 (three) times daily.) 90 tablet 6    timolol maleate 0.5% (TIMOPTIC) 0.5 % Drop Instill 1 drop into the right eye 2 times a day for 30 days 5 mL 6    [DISCONTINUED] atenoloL (TENORMIN) 50 MG tablet Take 1 tablet (50 mg total) by mouth every other day. 45 tablet 3    [DISCONTINUED] omeprazole (PRILOSEC) 20 MG capsule Take 2 capsules (40 mg total) by mouth once daily. for 10 days 20 capsule 0       Scheduled meds:   amLODIPine  5 mg " Oral Daily    carvediloL  25 mg Oral BID    epoetin teresa-epbx  100 Units/kg Subcutaneous Every Tues, Thurs, Sat    FLUoxetine  20 mg Oral Daily    gabapentin  600 mg Oral BID    hydrALAZINE  100 mg Oral BID    insulin aspart U-100  8 Units Subcutaneous TID AC    insulin detemir U-100 (Levemir)  21 Units Subcutaneous QHS    isosorbide mononitrate  30 mg Oral Daily    levETIRAcetam  500 mg Oral BID    meropenem (MERREM) IVPB  500 mg Intravenous Q24H    mupirocin   Nasal BID    polyethylene glycol  17 g Oral Daily       Infusions:        PRN meds:  acetaminophen, albuterol-ipratropium, aluminum-magnesium hydroxide-simethicone, dextrose 10%, dextrose 10%, dextrose 50%, dextrose 50%, glucagon (human recombinant), glucose, glucose, haloperidol lactate, insulin aspart U-100, melatonin, morphine, naloxone, ondansetron, prochlorperazine, simethicone, sodium chloride 0.9%    Review of Systems:    OBJECTIVE:     Vital Signs and IO:  Temp:  [98 °F (36.7 °C)-98.7 °F (37.1 °C)]   Pulse:  [75-85]   Resp:  [16-20]   BP: ()/(58-82)   SpO2:  [93 %-97 %]   No intake/output data recorded.  Wt Readings from Last 5 Encounters:   12/15/23 51.9 kg (114 lb 6.7 oz)   10/18/23 61.8 kg (136 lb 3.9 oz)   10/13/23 59.4 kg (130 lb 15.3 oz)   10/03/23 60.3 kg (132 lb 15 oz)   09/19/23 48.9 kg (107 lb 12.9 oz)     Body mass index is 20.93 kg/m².    Physical Exam  Constitutional:       General: Patient is not in acute distress.     Appearance: Patient is well-developed. She is not diaphoretic.   HENT:      Head: Normocephalic and atraumatic.      Mouth/Throat: Mucous membranes are moist.   Eyes:      General: No scleral icterus.     Pupils: Pupils are equal, round, and reactive to light.   Cardiovascular:      Rate and Rhythm: Normal rate and regular rhythm.   Pulmonary:      Effort: Pulmonary effort is normal. No respiratory distress.      Breath sounds: No stridor.   Abdominal:      General: There is no distension.      Palpations: Abdomen  is soft.   Musculoskeletal:         General: No deformity. Normal range of motion.      Cervical back: Neck supple.   Skin:     General: Skin is warm and dry.      Findings: No rash present. No erythema.   Neurological:      Mental Status: Patient is alert and oriented to person, place, and time.      Cranial Nerves: No cranial nerve deficit.   Psychiatric:         Behavior: Behavior normal.     Laboratory:  Recent Labs   Lab 12/14/23  0409 12/15/23  0543 12/16/23  0540    139 139   K 5.2* 4.1 4.6    100 103   CO2 25 19* 24   BUN 62* 35* 60*   CREATININE 7.8* 6.0* 8.4*   * 113* 54*         Recent Labs   Lab 12/14/23  0409 12/15/23  0543 12/16/23  0540   CALCIUM 7.9* 9.1 8.5*   ALBUMIN 3.8 4.0 3.6   PHOS 7.9* 5.4* 7.1*   MG 2.4 2.2 2.3         Recent Labs   Lab 07/08/22  0516   PTH, Intact 289.8 H         Recent Labs   Lab 12/12/23  0906 12/12/23  2131 12/13/23  0742 12/13/23  1129 12/13/23  1545 12/13/23  1601 12/13/23  1620 12/13/23  1726   POCTGLUCOSE 307* 132* 176* 165* 34* 194* 183* 167*         Recent Labs   Lab 12/23/22  1413 03/03/23  0547 08/01/23  0813   Hemoglobin A1C 7.5 H 5.8 5.8         Recent Labs   Lab 12/14/23  0513 12/15/23  0543 12/16/23  0540   WBC 14.07* 11.75 9.03   HGB 8.0* 8.7* 8.5*   HCT 22.9* 25.6* 25.5*   PLT 90* 85* 60*   MCV 85 88 89   MCHC 34.9 34.0 33.3   MONO 8.4  1.2* 10.2  1.2* 13.7  1.2*   EOSINOPHIL 1.5 3.1 1.6         Recent Labs   Lab 12/14/23  0409 12/15/23  0543 12/16/23  0540   BILITOT 1.4* 1.7* 1.6*   PROT 6.8 7.4 6.9   ALBUMIN 3.8 4.0 3.6   ALKPHOS 198* 202* 173*   * 83* 57*   * 48* 29         Recent Labs   Lab 03/24/21  1826 03/25/21  1910 03/16/22  1848 05/15/22  2022 02/05/23  0156 09/19/23  1422 10/13/23  1001   Color, UA Yellow   < > Pale Yellow   < > Yellow Yellow Yellow   Appearance, UA  --    < >  --    < > Clear Clear Clear   pH, UA  --    < >  --    < > >8.0 A >8.0 A 8.0   Specific [place], UA  --    < >  --    < > 1.020 1.020  1.015   Protein, UA  --    < >  --    < > 3+ A 4+ 4+   Glucose, UA >=500 mg/dL A   < > 50 mg/dL A   < > 2+ A 4+ A 3+ A   Ketones, UA  --    < >  --    < > 1+ A Trace A Trace A   Urobilinogen, UA Normal   < > Normal   < > Negative Negative Negative   Bilirubin (UA) Negative   < > Negative   < > 2+ A Negative 1+ A   Occult Blood UA  --    < >  --    < > 3+ A 2+ A Negative   Blood, UA >1.0 mg/dL A   < > 0.03 mg/dL A   < >  --   --   --    Nitrite, UA  --    < >  --    < > Negative Negative Negative   RBC, UA  --    < >  --    < > 15 H 17 H 8 H   WBC, UA  --    < >  --    < > 1 2 5   Bacteria  --    < >  --    < > Rare Negative Negative   Mucus, UA Rare A  --  Rare A  --   --   --   --    Hyaline Casts, UA  --    < >  --    < > 2 A 1 2 A    < > = values in this interval not displayed.         Recent Labs   Lab 10/03/23  2105 10/04/23  0438 10/15/23  0353   POC PH 7.290 LL 7.305 L 7.239 LL   POC PCO2 45.2 H 43.6 37.8   POC HCO3 21.7 L 21.7 L 16.1 L   POC PO2 110 H 92 37 LL   POC SATURATED O2 98 96 60   POC BE -5 -5 -11   Sample ARTERIAL ARTERIAL VENOUS         Microbiology Results (last 7 days)       ** No results found for the last 168 hours. **            ASSESSMENT/PLAN:     ESRD on HD TTS via AVF  Next HD per schedule.  Continue current dialysis prescription.  Renal diet - low K, low phos.  No IVs or BP checks on access and/or non-dominant arm.    Anemia of CKD  Hgb and HCT are acceptable. Monitor for now.  Will provide SHELLEY with HD.  s/p blood transfusion, will give blood PRN.    MBD / Secondary HPT  Ca, Phos, PTH and vitamin D levels are acceptable.   Phos binders, vitamin D and analogues, calcimimetics will be given as needed.    HTN  BP seem controlled.   Tolerate asymptomatic HTN up to -160.  Continue home meds.  Low sodium diet.    Chronic abdominal pain  Possible cholangitis  Management per GI and primary team.    Thank you for allowing us to participate in the care of your patient!   We will follow the  patient and provide recommendations as needed.    Patient care time was spent personally by me on the following activities:     Obtaining a history.  Examination of patient.  Providing medical care at the patients bedside.  Developing a treatment plan with patient or surrogate and bedside caregivers.  Ordering and reviewing laboratory studies, radiographic studies, pulse oximetry.  Ordering and performing treatments and interventions.  Evaluation of patient's response to treatment.  Discussions with consultants while on the unit and immediately available to the patient.  Re-evaluation of the patient's condition.  Documentation in the medical record.     Mando Benitez MD    [facility]  [address] [place] [id number]    [phone] - tel  [phone] - fax    12/16/2023

## 2023-12-16 NOTE — ASSESSMENT & PLAN NOTE
Noted. Patient with suspected gastroparesis vs cholangitis?  Significant lower abdominal discomfort.  Patient has history of multiple admissions, epigastric pain requiring pain meds.  Per reports abdominal imaging in the past have been negative.  GI consult done- possible cholangitis- no fever though. will plan on EUS on 12/16/2023 given patient unable to do MRCP because of panic attack.  NPO at midnight  Continue IVAB empirically. Dc if EUS negative  Pain med prn-will start with lower dose since pt has history of decompensating   Patient noted to make herself vomit, abdominal pain only when aroused.

## 2023-12-16 NOTE — TRANSFER OF CARE
"Anesthesia Transfer of Care Note    Patient: Tabby Howard    Procedure(s) Performed: Procedure(s) (LRB):  ULTRASOUND, UPPER GI TRACT, ENDOSCOPIC (N/A)    Patient location: GI    Anesthesia Type: general    Transport from OR: Transported from OR on 2-3 L/min O2 by NC with adequate spontaneous ventilation    Post pain: adequate analgesia    Post assessment: no apparent anesthetic complications    Post vital signs: stable    Level of consciousness: awake    Nausea/Vomiting: no nausea/vomiting    Complications: none    Transfer of care protocol was followed      Last vitals: Visit Vitals  BP (!) 140/82 (BP Location: Right arm, Patient Position: Sitting)   Pulse 76   Temp 36.7 °C (98 °F) (Temporal)   Resp 16   Ht 5' 2" (1.575 m)   Wt 51.9 kg (114 lb 6.7 oz)   SpO2 96%   Breastfeeding No   BMI 20.93 kg/m²     "

## 2023-12-16 NOTE — RESPIRATORY THERAPY
12/15/23 2102   Patient Assessment/Suction   Level of Consciousness (AVPU) alert   Respiratory Effort Short of breath  (PT UPSET AND CRYING AT THIS MOMENT MAY BE CAUSING SHORTNESS OF BREATH)   Expansion/Accessory Muscles/Retractions no use of accessory muscles;no retractions   All Lung Fields Breath Sounds Anterior:;diminished   Rhythm/Pattern, Respiratory shortness of breath   Cough Frequency no cough   PRE-TX-O2   Device (Oxygen Therapy) room air   SpO2 (!) 93 %   Pulse Oximetry Type Intermittent   $ Pulse Oximetry - Multiple Charge Pulse Oximetry - Multiple   Pulse 85   Resp 20   Positioning Flat   Aerosol Therapy   $ Aerosol Therapy Charges PRN treatment not required   Education   $ Education 15 min;Other (see comment)  (O2 CHECK)   Respiratory Evaluation   $ Care Plan Tech Time 15 min   $ Eval/Re-eval Charges Re-evaluation

## 2023-12-16 NOTE — PROVATION PATIENT INSTRUCTIONS
Discharge Summary/Instructions after an Endoscopic Procedure  Patient Name: Tabby Howard  Patient MRN: [id number]  Patient YOB: 1990 Saturday, December 16, 2023  Micky Paredes III, MD  RESTRICTIONS:  During your procedure today, you received medications for sedation.  These   medications may affect your judgment, balance and coordination.  Therefore,   for 24 hours, you have the following restrictions:   - DO NOT drive a car, operate machinery, make legal/financial decisions,   sign important papers or drink alcohol.    ACTIVITY:  Today: no heavy lifting, straining or running due to procedural   sedation/anesthesia.  The following day: return to full activity including work.  DIET:  Eat and drink normally unless instructed otherwise.     TREATMENT FOR COMMON SIDE EFFECTS:  - Mild abdominal pain, nausea, belching, bloating or excessive gas:  rest,   eat lightly and use a heating pad.  - Sore Throat: treat with throat lozenges and/or gargle with warm salt   water.  - Because air was used during the procedure, expelling large amounts of air   from your rectum or belching is normal.  - If a bowel prep was taken, you may not have a bowel movement for 1-3 days.    This is normal.  SYMPTOMS TO WATCH FOR AND REPORT TO YOUR PHYSICIAN:  1. Abdominal pain or bloating, other than gas cramps.  2. Chest pain.  3. Back pain.  4. Signs of infection such as: chills or fever occurring within 24 hours   after the procedure.  5. Rectal bleeding, which would show as bright red, maroon, or black stools.   (A tablespoon of blood from the rectum is not serious, especially if   hemorrhoids are present.)  6. Vomiting.  7. Weakness or dizziness.  GO DIRECTLY TO THE NEAREST EMERGENCY ROOM IF YOU HAVE ANY OF THE FOLLOWING:      Difficulty breathing              Chills and/or fever over 101 F   Persistent vomiting and/or vomiting blood   Severe abdominal pain   Severe chest pain   Black, tarry stools   Bleeding- more than one  tablespoon   Any other symptom or condition that you feel may need urgent attention  Your doctor recommends these additional instructions:  If any biopsies were taken, your doctors clinic will contact you in 1 to 2   weeks with any results.  - Patient has a contact number available for emergencies.  The signs and   symptoms of potential delayed complications were discussed with the   patient.  Return to normal activities tomorrow.  Written discharge   instructions were provided to the patient.   - Resume regular diet.   - Continue present medications.   - Return patient to hospital [facility] for ongoing care.   - Conservative for now as EUS unremarkable and WBC/LFTS resolved  For questions, problems or results please call your physician - Micky Paredes III, MD at Work:  [phone].  [facility], EMERGENCY ROOM PHONE NUMBER: [phone]  IF A COMPLICATION OR EMERGENCY SITUATION ARISES AND YOU ARE UNABLE TO REACH   YOUR PHYSICIAN - GO DIRECTLY TO THE EMERGENCY ROOM.  Micky Paredes III, MD  12/16/2023 10:34:30 AM  This report has been verified and signed electronically.  Dear patient,  As a result of recent federal legislation (The Federal Cures Act), you may   receive lab or pathology results from your procedure in your [facility]   account before your physician is able to contact you. Your physician or   their representative will relay the results to you with their   recommendations at their soonest availability.  Thank you,  PROVATION

## 2023-12-16 NOTE — PROGRESS NOTES
[facility]  Progress Note    Patient Name: Tabby Howard  MRN: [id number]  Patient Class: IP- Inpatient   Admission Date: 12/12/2023  Length of Stay: 3 days  Attending Physician: Donavon Bailey MD  Primary Care Provider: Melony Kim NP        Subjective:     Principal Problem:Abdominal pain        HPI:  Ms. Howard is a 33-year-old female with history of ESRD on HD, suspected gastroparesis, sickle cell trait, hypertension, pericardial effusion, type 2 diabetes who was admitted at [facility] for n/v and was found to be in acidosis, anemic and hyperkalemic- Hd was done yesterday and 2 units were tranfused. Today at the other [place] other abdominal pain got worse, LFTs went up and she also had RRT for lethargy, low BP, low BG and for the concern of cholangitis she was transferred here. GI consult was also done there and pending MRCP.       Pt was seen in ICU and was able to give some hx, was telling her abdominal pain is 10/10, sharp happening at r side and its different from when she had gall stones removed in the past. BP stable, pulse regular, Hb is still low- transfusing one more unit and sending for MRCP.             Overview/Hospital Course:  Patient with history of ESRD, suspected gastroparesis, sickle cell trait, hypertension, type 2 diabetes admitted for nausea and vomiting, found to be acidotic, anemic, hyperkalemic.  Received dialysis with improvement in electrolytes.  Noted to have worsening abdominal pain, LFTs elevated.  Rapid response was called for low blood pressure and low glucose, there was concern for cholangitis the patient was transferred to [facility].  GI was consulted recommended MRCP.  Patient did not have MRCP done given panic attack.  Patient uncooperative, reports significant abdominal discomfort although will not allow any imaging done.  Discussed with Gastroenterology will plan on EGD with EUS on 12/16/2023.  Nephrology consulted for HD management.   Hematology consulted given concern of sickle cell crisis, per hematology patient has sickle cell trait so unlikely to have crisis at this time.    Interval History:  Patient initially somnolent on examination, after waking up she starts to cry and complain of abdominal pain.  Plan for EUS with Gastroenterology today.  Plan for HD today.  Lactic 0.4.     Review of Systems   Cardiovascular:  Negative for chest pain and palpitations.   Gastrointestinal:  Positive for abdominal pain. Negative for nausea.   Psychiatric/Behavioral:  Positive for agitation.      Objective:     Vital Signs (Most Recent):  Temp: 98.4 °F (36.9 °C) (12/16/23 0557)  Pulse: 80 (12/16/23 0732)  Resp: 18 (12/16/23 0732)  BP: 109/63 (12/16/23 0557)  SpO2: 95 % (12/16/23 0732) Vital Signs (24h Range):  Temp:  [98.4 °F (36.9 °C)-98.7 °F (37.1 °C)] 98.4 °F (36.9 °C)  Pulse:  [75-85] 80  Resp:  [18-20] 18  SpO2:  [93 %-97 %] 95 %  BP: ()/(58-84) 109/63     Weight: 51.9 kg (114 lb 6.7 oz)  Body mass index is 20.93 kg/m².    Intake/Output Summary (Last 24 hours) at 12/16/2023 0952  Last data filed at 12/15/2023 1747  Gross per 24 hour   Intake 0 ml   Output 0 ml   Net 0 ml         Physical Exam  Constitutional:       General: She is in acute distress.   Cardiovascular:      Rate and Rhythm: Normal rate and regular rhythm.   Pulmonary:      Effort: No respiratory distress.      Breath sounds: No wheezing.   Abdominal:      General: Abdomen is flat.      Palpations: Abdomen is soft.      Tenderness: There is abdominal tenderness.   Psychiatric:      Comments: Abnormal behavior, uncooperative.             Significant Labs: All pertinent labs within the past 24 hours have been reviewed.  CBC:   Recent Labs   Lab 12/15/23  0543 12/16/23  0540   WBC 11.75 9.03   HGB 8.7* 8.5*   HCT 25.6* 25.5*   PLT 85* 60*     CMP:   Recent Labs   Lab 12/15/23  0543 12/16/23  0540    139   K 4.1 4.6    103   CO2 19* 24   * 54*   BUN 35* 60*    CREATININE 6.0* 8.4*   CALCIUM 9.1 8.5*   PROT 7.4 6.9   ALBUMIN 4.0 3.6   BILITOT 1.7* 1.6*   ALKPHOS 202* 173*   AST 48* 29   ALT 83* 57*   ANIONGAP 20* 12       Significant Imaging: I have reviewed all pertinent imaging results/findings within the past 24 hours.    Assessment/Plan:      * Abdominal pain  Noted. Patient with suspected gastroparesis vs cholangitis?  Significant lower abdominal discomfort.  Patient has history of multiple admissions, epigastric pain requiring pain meds.  Per reports abdominal imaging in the past have been negative.  GI consult done- possible cholangitis- no fever though. will plan on EUS on 12/16/2023 given patient unable to do MRCP because of panic attack.  NPO at midnight  Continue IVAB empirically. Dc if EUS negative  Pain med prn-will start with lower dose since pt has history of decompensating   Patient noted to make herself vomit, abdominal pain only when aroused.      Agitation  Psych consulted  Haldol prn     Hypertension  Hypertension Medications               amLODIPine (NORVASC) 5 MG tablet Take 1 tablet (5 mg total) by mouth once daily.    carvediloL (COREG) 25 MG tablet Take 1 tablet (25 mg total) by mouth 2 (two) times daily.    hydrALAZINE (APRESOLINE) 100 MG tablet Take 1 tablet (100 mg total) by mouth 2 (two) times daily.    isosorbide mononitrate (IMDUR) 30 MG 24 hr tablet Take 1 tablet (30 mg total) by mouth once daily.          Continue her usual BP meds which are listed above.  Monitor pressures.  Controlled at present.      Hyperkalemia  This patient has hyperkalemia which improved with HD.. We will monitor for arrhythmias with EKG or continuous telemetry. We will treat the hyperkalemia with HD. The likely etiology of the hyperkalemia is ESRD.  The patients latest potassium has been reviewed and the results are listed below  Recent Labs   Lab 12/16/23  0540   K 4.6               Anemia in ESRD (end-stage renal disease)  Patient's anemia is currently  controlled. Has received 3 Units of Blood thus far. Etiology likely d/t ESRD and JED. No acute blood loss notes.   Current CBC reviewed-   Lab Results   Component Value Date    HGB 8.5 (L) 12/16/2023    HCT 25.5 (L) 12/16/2023     Monitor serial CBC and transfuse if patient becomes hemodynamically unstable, symptomatic or H/H drops below 7/21.       Chronic congestive heart failure with left ventricular diastolic dysfunction  Patient is identified as having Diastolic (HFpEF) heart failure that is Chronic. CHF is currently controlled. Latest ECHO performed and demonstrates- Results for orders placed during the hospital encounter of 09/10/23    Echo    Interpretation Summary    Left Ventricle: The left ventricle is normal in size. Mildly increased ventricular mass. Mildly increased wall thickness. Normal wall motion. There is low normal systolic function with a visually estimated ejection fraction of 50 - 55%. There is normal diastolic function.    Right Ventricle: Normal right ventricular cavity size. Wall thickness is normal. Right ventricle wall motion  is normal. Systolic function is normal.    IVC/SVC: Normal venous pressure at 3 mmHg.    Pericardium: There is a small circumferential effusion. Pericardial effusion is echolucent. No indication of cardiac tamponade. Evidence includes no chamber collapse.  . Continue Beta Blocker and ACE/ARB and monitor clinical status closely. Monitor on telemetry. Patient is off CHF pathway.  Monitor strict Is&Os and daily weights.      Type 2 diabetes mellitus with hyperglycemia, with long-term current use of insulin, previous HbA1c 5.8%  Patient's FSGs are controlled on current medication regimen. Pt did have episode of hypoglycemia which has resolved.   Last A1c reviewed-   Lab Results   Component Value Date    HGBA1C 5.8 08/01/2023     Most recent fingerstick glucose reviewed-   POC Glucose   Date Value Ref Range Status   12/16/2023 183 (H) 70 - 110 Final   12/16/2023 59 (L)  70 - 110 Final   12/15/2023 127 (H) 70 - 110 Final         Current correctional scale  Medium  Maintain anti-hyperglycemic dose as follows-   Antihyperglycemics (From admission, onward)      Start     Stop Route Frequency Ordered    12/13/23 0645  insulin aspart U-100 pen 8 Units         -- SubQ 3 times daily before meals 12/12/23 1728    12/12/23 2100  insulin detemir U-100 (Levemir) pen 21 Units         -- SubQ Nightly 12/12/23 1728    12/12/23 1728  insulin aspart U-100 pen 1-10 Units         -- SubQ Before meals & nightly PRN 12/12/23 1728          Hold Oral hypoglycemics while patient is in the hospital.        Thrombocytopenia  No bleeding, cont to monitor     Lab Results   Component Value Date    PLT 60 (L) 12/16/2023         ESRD (end stage renal disease)  Nephrologist is managing dialysis needs.    Seizure disorder  C/w Keppra      Sickle cell trait  Heme consulted-unlikely sickle cell crisis   Anemia stable      Gastroparesis - suspected, unconfirmed  Unknown hx, will need follow with GI outpatient  Did not start reglan given contraindication in seizure disorder  Diffuse abdominal discomfort odd for gastroparesis         VTE Risk Mitigation (From admission, onward)           Ordered     IP VTE HIGH RISK PATIENT  Once         12/12/23 1728     Place sequential compression device  Until discontinued         12/12/23 1728     Reason for No Pharmacological VTE Prophylaxis  Once        Question:  Reasons:  Answer:  Already adequately anticoagulated on oral Anticoagulants    12/12/23 1728                    Discharge Planning   TATIANA: 12/18/2023     Code Status: Full Code   Is the patient medically ready for discharge?:     Reason for patient still in hospital (select all that apply): Patient trending condition  Discharge Plan A: Home with family                  Donavon Bailey MD  Department of Hospital Medicine   [facility]

## 2023-12-16 NOTE — ANESTHESIA POSTPROCEDURE EVALUATION
Anesthesia Post Evaluation    Patient: Tabby Howard    Procedure(s) Performed: Procedure(s) (LRB):  ULTRASOUND, UPPER GI TRACT, ENDOSCOPIC (N/A)    Final Anesthesia Type: general      Patient location during evaluation: GI PACU  Patient participation: Yes- Able to Participate  Level of consciousness: awake and alert and oriented  Post-procedure vital signs: reviewed and stable  Pain management: adequate  Airway patency: patent    PONV status at discharge: No PONV  Anesthetic complications: no      Cardiovascular status: blood pressure returned to baseline  Respiratory status: unassisted, spontaneous ventilation and room air  Hydration status: euvolemic  Follow-up not needed.              Vitals Value Taken Time   /65 12/16/23 1042   Temp 36.6 °C (97.9 °F) 12/16/23 1042   Pulse 70 12/16/23 1042   Resp 16 12/16/23 1042   SpO2 100 % 12/16/23 1042     Pt mental status returned to  baseline, she appears more comfortable after procedure.    No case tracking events are documented in the log.      Pain/Lien Score: Pain Rating Prior to Med Admin: 10 (12/15/2023  9:02 PM)  Lien Score: 9 (12/16/2023 10:40 AM)

## 2023-12-16 NOTE — ANESTHESIA PREPROCEDURE EVALUATION
12/16/2023  Tabby Howard is a 33 y.o., female.      Results for orders placed or performed during the hospital encounter of 10/13/23   EKG 12-lead    Collection Time: 10/13/23  8:30 AM    Narrative    Test Reason : R10.9,    Vent. Rate : 085 BPM     Atrial Rate : 085 BPM     P-R Int : 168 ms          QRS Dur : 080 ms      QT Int : 418 ms       P-R-T Axes : 032 -29 059 degrees     QTc Int : 497 ms    Normal sinus rhythm  Possible Anterior infarct ,age undetermined  Abnormal ECG  When compared with ECG of 03-OCT-2023 13:56,  VT interval has decreased  QRS duration has decreased  Borderline criteria for Anterior infarct are now Present  QT has shortened  Confirmed by Brandon SPRINGER, Dustin EPSTEIN ([id number]) on 10/17/2023 8:57:37 PM    Referred By: [email or web address]   SELF           Confirmed By:Dustin Taylor MD        Imaging Results    None          Lab Results   Component Value Date    WBC 9.03 12/16/2023    HGB 8.5 (L) 12/16/2023    HCT 25.5 (L) 12/16/2023    MCV 89 12/16/2023    PLT 60 (L) 12/16/2023      BMP  Lab Results   Component Value Date     12/16/2023    K 4.6 12/16/2023     12/16/2023    CO2 24 12/16/2023    BUN 60 (H) 12/16/2023    CREATININE 8.4 (H) 12/16/2023    CALCIUM 8.5 (L) 12/16/2023    ANIONGAP 12 12/16/2023    EGFRNORACEVR 5.9 (A) 12/16/2023          Results for orders placed during the hospital encounter of 09/10/23    Echo    Interpretation Summary    Left Ventricle: The left ventricle is normal in size. Mildly increased ventricular mass. Mildly increased wall thickness. Normal wall motion. There is low normal systolic function with a visually estimated ejection fraction of 50 - 55%. There is normal diastolic function.    Right Ventricle: Normal right ventricular cavity size. Wall thickness is normal. Right ventricle wall motion  is normal. Systolic function is normal.    IVC/SVC: Normal  venous pressure at 3 mmHg.    Pericardium: There is a small circumferential effusion. Pericardial effusion is echolucent. No indication of cardiac tamponade. Evidence includes no chamber collapse.         Pre-op Assessment    I have reviewed the Patient Summary Reports.     I have reviewed the Nursing Notes. I have reviewed the NPO Status.   I have reviewed the Medications.     Review of Systems  Anesthesia Hx:  No problems with previous Anesthesia             Denies Family Hx of Anesthesia complications.    Denies Personal Hx of Anesthesia complications.                    Social:  No Alcohol Use, Non-Smoker       Hematology/Oncology:       -- Anemia:               Hematology Comments: Eliquis Therapy     Patient post transfusion 2 units PRBC     Sickle cell trait with prior hx of crisis     Hx of DVT     Thrombocytopenia Plts 85K                    EENT/Dental:   Hx of Vitreous hemorrhage, both eyes Eyes: Visual Impairment  , Blind - right eye only  Catarract        Eye Disease:  Diabetic Retinopathy                   Cardiovascular:     Hypertension, poorly controlled    Dysrhythmias (hx SVT)   CHF       ECG has been reviewed. History of supraventricular tachycardia    Patient with Hx prior cardiac arrest     Heart failure with preserved ejection fraction    Hx of Prolonged QT     Small Pericardial effusion                          Pulmonary:  Pulmonary Normal        Oxygen Saturation 93 % prior to procedure                Renal/:  Chronic Renal Disease, ESRD, Dialysis        Kidney Function/Disease          Dialysis Dependent     Hepatic/GI:     GERD   Patient presents with c/o abdominal pain elevated liver enzymes with prior cholecystectomy     Hx of Gastritis  Esophageal / Stomach Disorders Gerd, Gastric Conditions:, Gastroparesis      Biliary Disease  Liver Disease, Abnormal Liver Enzymes        Musculoskeletal:  Musculoskeletal Normal                Neurological:       Seizures, well controlled                                 Endocrine:  Diabetes, poorly controlled, type 2, using insulin           Psych:  Psychiatric History  depression Adjustment disorder  Agitation      Mood disorder               Physical Exam  General: Alert, Anxious and Oriented  Pt very anxious and crying, c/o abd pain, difficulty answer questions. Expresses understanding of situation and able to give consent.  Airway:  Mallampati: III / II  Mouth Opening: Normal  TM Distance: Normal  Tongue: Normal  Neck ROM: Normal ROM    Chest/Lungs:  Clear to auscultation    Heart:  Rate: Normal  Rhythm: Regular Rhythm  Sounds: Normal    Abdomen:  Normal, Soft, Nontender        Anesthesia Plan  Type of Anesthesia, risks & benefits discussed:    Anesthesia Type: Gen Natural Airway  Intra-op Monitoring Plan: Standard ASA Monitors  Post Op Pain Control Plan:   (medical reason for not using multimodal pain management)  Induction:  IV  Informed Consent: Informed consent signed with the Patient and all parties understand the risks and agree with anesthesia plan.  All questions answered.   ASA Score: 3 Emergent  Anesthesia Plan Notes:     GNA  No IV Lidocaine (Seizures)  POM   Propofol     Ready For Surgery From Anesthesia Perspective.     .

## 2023-12-16 NOTE — ASSESSMENT & PLAN NOTE
This patient has hyperkalemia which improved with HD.. We will monitor for arrhythmias with EKG or continuous telemetry. We will treat the hyperkalemia with HD. The likely etiology of the hyperkalemia is ESRD.  The patients latest potassium has been reviewed and the results are listed below  Recent Labs   Lab 12/16/23  0540   K 4.6

## 2023-12-16 NOTE — PROGRESS NOTES
1300 ml net uf removed   12/16/23 1630   Required for all Hemodialysis Patients   Hepatitis Status negative   Handoff Report   Received From Dahlia Mendez   Treatment Type   Treatment Type Maintenance   Vital Signs   Temp 97.9 °F (36.6 °C)   Pulse 71   Resp 18   /78   Assessments (Pre/Post)   Date Hepatitis Profile Obtained 12/14/23   Blood Liters Processed (BLP) 52   Transport Modality bed   Level of Consciousness (AVPU) alert   Dialyzer Clearance mildly streaked   Pain   Preferred Pain Scale number (Numeric Rating Pain Scale)   Pain Rating (0-10): Rest 0        Hemodialysis Catheter 06/17/23 1135 right internal jugular   Placement Date/Time: 06/17/23 1135   Present Prior to Hospital Arrival?: No  Hand Hygiene: Performed  Barrier Precautions: Performed  Skin Antisepsis: ChloraPrep  Hemodialysis Catheter Type: Tunneled catheter  Location: right internal jugular  Cathete...   Line Necessity Review CRRT/HD   Site Assessment No drainage;No redness;No swelling;No warmth   Line Securement Device Secured with sutures   Dressing Type CHG impregnated dressing/sponge   Dressing Status Clean;Dry;Intact   Dressing Intervention Integrity maintained   Date on Dressing 12/16/23   Dressing Due to be Changed 12/28/23   Venous Patency/Care flushed w/o difficulty;normal saline locked;deaccessed   Arterial Patency/Care flushed w/o difficulty;normal saline locked;deaccessed   Post-Hemodialysis Assessment   Rinseback Volume (mL) 250 mL   Blood Volume Processed (Liters) 52 L   Dialyzer Clearance Lightly streaked   Duration of Treatment 180 minutes   Additional Fluid Intake (mL) 500 mL   Total UF (mL) 1800 mL   Net Fluid Removal 1300   Patient Response to Treatment tolerated well   Post-Treatment Weight 50.5 kg (111 lb 5.3 oz)   Treatment Weight Change -1.4   Post-Hemodialysis Comments tx completed   Edema   Edema generalized     Educated about hd tx

## 2023-12-16 NOTE — CARE UPDATE
12/16/23 0732   Patient Assessment/Suction   Level of Consciousness (AVPU) alert   Respiratory Effort Normal;Unlabored   Expansion/Accessory Muscles/Retractions no use of accessory muscles;no retractions;expansion symmetric   All Lung Fields Breath Sounds clear   Rhythm/Pattern, Respiratory unlabored;pattern regular;depth regular;chest wiggle adequate;no shortness of breath reported   Cough Frequency no cough   PRE-TX-O2   Device (Oxygen Therapy) room air   SpO2 95 %   Pulse Oximetry Type Intermittent   $ Pulse Oximetry - Multiple Charge Pulse Oximetry - Multiple   Pulse 80   Resp 18   Aerosol Therapy   $ Aerosol Therapy Charges PRN treatment not required   Education   $ Education 15 min

## 2023-12-16 NOTE — ASSESSMENT & PLAN NOTE
Patient's anemia is currently controlled. Has received 3 Units of Blood thus far. Etiology likely d/t ESRD and JED. No acute blood loss notes.   Current CBC reviewed-   Lab Results   Component Value Date    HGB 8.5 (L) 12/16/2023    HCT 25.5 (L) 12/16/2023     Monitor serial CBC and transfuse if patient becomes hemodynamically unstable, symptomatic or H/H drops below 7/21.

## 2023-12-17 VITALS
RESPIRATION RATE: 19 BRPM | HEART RATE: 82 BPM | BODY MASS INDEX: 21.06 KG/M2 | DIASTOLIC BLOOD PRESSURE: 88 MMHG | SYSTOLIC BLOOD PRESSURE: 154 MMHG | TEMPERATURE: 98 F | OXYGEN SATURATION: 98 % | HEIGHT: 62 IN | WEIGHT: 114.44 LBS

## 2023-12-17 PROBLEM — E87.5 HYPERKALEMIA: Status: RESOLVED | Noted: 2023-04-22 | Resolved: 2023-12-17

## 2023-12-17 PROBLEM — R10.9 ABDOMINAL PAIN: Status: RESOLVED | Noted: 2023-09-19 | Resolved: 2023-12-17

## 2023-12-17 PROBLEM — R45.1 AGITATION: Status: RESOLVED | Noted: 2023-12-15 | Resolved: 2023-12-17

## 2023-12-17 LAB
ALBUMIN SERPL BCP-MCNC: 3.8 G/DL (ref 3.5–5.2)
ALP SERPL-CCNC: 170 U/L (ref 55–135)
ALT SERPL W/O P-5'-P-CCNC: 43 U/L (ref 10–44)
ANION GAP SERPL CALC-SCNC: 12 MMOL/L (ref 8–16)
AST SERPL-CCNC: 21 U/L (ref 10–40)
BASOPHILS # BLD AUTO: 0.04 K/UL (ref 0–0.2)
BASOPHILS NFR BLD: 0.7 % (ref 0–1.9)
BILIRUB SERPL-MCNC: 2 MG/DL (ref 0.1–1)
BUN SERPL-MCNC: 29 MG/DL (ref 6–20)
CALCIUM SERPL-MCNC: 8.8 MG/DL (ref 8.7–10.5)
CHLORIDE SERPL-SCNC: 103 MMOL/L (ref 95–110)
CO2 SERPL-SCNC: 24 MMOL/L (ref 23–29)
CREAT SERPL-MCNC: 5.5 MG/DL (ref 0.5–1.4)
DIFFERENTIAL METHOD: ABNORMAL
EOSINOPHIL # BLD AUTO: 0.2 K/UL (ref 0–0.5)
EOSINOPHIL NFR BLD: 3.3 % (ref 0–8)
ERYTHROCYTE [DISTWIDTH] IN BLOOD BY AUTOMATED COUNT: 21.1 % (ref 11.5–14.5)
EST. GFR  (NO RACE VARIABLE): 9.8 ML/MIN/1.73 M^2
GLUCOSE SERPL-MCNC: 230 MG/DL (ref 70–110)
GLUCOSE SERPL-MCNC: 237 MG/DL (ref 70–110)
HCT VFR BLD AUTO: 30.1 % (ref 37–48.5)
HGB BLD-MCNC: 9.8 G/DL (ref 12–16)
IMM GRANULOCYTES # BLD AUTO: 0.07 K/UL (ref 0–0.04)
IMM GRANULOCYTES NFR BLD AUTO: 1.2 % (ref 0–0.5)
LYMPHOCYTES # BLD AUTO: 1.3 K/UL (ref 1–4.8)
LYMPHOCYTES NFR BLD: 22.3 % (ref 18–48)
MAGNESIUM SERPL-MCNC: 2.1 MG/DL (ref 1.6–2.6)
MCH RBC QN AUTO: 29 PG (ref 27–31)
MCHC RBC AUTO-ENTMCNC: 32.6 G/DL (ref 32–36)
MCV RBC AUTO: 89 FL (ref 82–98)
MONOCYTES # BLD AUTO: 0.6 K/UL (ref 0.3–1)
MONOCYTES NFR BLD: 10.6 % (ref 4–15)
NEUTROPHILS # BLD AUTO: 3.6 K/UL (ref 1.8–7.7)
NEUTROPHILS NFR BLD: 61.9 % (ref 38–73)
NRBC BLD-RTO: 2 /100 WBC
PHOSPHATE SERPL-MCNC: 4.6 MG/DL (ref 2.7–4.5)
PLATELET # BLD AUTO: 80 K/UL (ref 150–450)
PMV BLD AUTO: 9.2 FL (ref 9.2–12.9)
POTASSIUM SERPL-SCNC: 4 MMOL/L (ref 3.5–5.1)
PROT SERPL-MCNC: 7 G/DL (ref 6–8.4)
RBC # BLD AUTO: 3.38 M/UL (ref 4–5.4)
SODIUM SERPL-SCNC: 139 MMOL/L (ref 136–145)
WBC # BLD AUTO: 5.83 K/UL (ref 3.9–12.7)

## 2023-12-17 PROCEDURE — 63600175 PHARM REV CODE 636 W HCPCS: Performed by: HOSPITALIST

## 2023-12-17 PROCEDURE — 63600175 PHARM REV CODE 636 W HCPCS: Performed by: STUDENT IN AN ORGANIZED HEALTH CARE EDUCATION/TRAINING PROGRAM

## 2023-12-17 PROCEDURE — 84100 ASSAY OF PHOSPHORUS: CPT | Performed by: HOSPITALIST

## 2023-12-17 PROCEDURE — 25000003 PHARM REV CODE 250: Performed by: HOSPITALIST

## 2023-12-17 PROCEDURE — 36415 COLL VENOUS BLD VENIPUNCTURE: CPT | Performed by: HOSPITALIST

## 2023-12-17 PROCEDURE — 83735 ASSAY OF MAGNESIUM: CPT | Performed by: HOSPITALIST

## 2023-12-17 PROCEDURE — 80053 COMPREHEN METABOLIC PANEL: CPT | Performed by: HOSPITALIST

## 2023-12-17 PROCEDURE — 94761 N-INVAS EAR/PLS OXIMETRY MLT: CPT

## 2023-12-17 PROCEDURE — 25000003 PHARM REV CODE 250: Performed by: STUDENT IN AN ORGANIZED HEALTH CARE EDUCATION/TRAINING PROGRAM

## 2023-12-17 PROCEDURE — 99900031 HC PATIENT EDUCATION (STAT)

## 2023-12-17 PROCEDURE — 99900035 HC TECH TIME PER 15 MIN (STAT)

## 2023-12-17 PROCEDURE — 85025 COMPLETE CBC W/AUTO DIFF WBC: CPT | Performed by: HOSPITALIST

## 2023-12-17 RX ADMIN — AMLODIPINE BESYLATE 5 MG: 5 TABLET ORAL at 08:12

## 2023-12-17 RX ADMIN — HYDRALAZINE HYDROCHLORIDE 100 MG: 25 TABLET, FILM COATED ORAL at 08:12

## 2023-12-17 RX ADMIN — CARVEDILOL 25 MG: 25 TABLET, FILM COATED ORAL at 08:12

## 2023-12-17 RX ADMIN — POLYETHYLENE GLYCOL 3350 17 G: 17 POWDER, FOR SOLUTION ORAL at 08:12

## 2023-12-17 RX ADMIN — GABAPENTIN 600 MG: 300 CAPSULE ORAL at 08:12

## 2023-12-17 RX ADMIN — FLUOXETINE 20 MG: 20 CAPSULE ORAL at 08:12

## 2023-12-17 RX ADMIN — MUPIROCIN 1 G: 20 OINTMENT TOPICAL at 08:12

## 2023-12-17 RX ADMIN — ISOSORBIDE MONONITRATE 30 MG: 30 TABLET, EXTENDED RELEASE ORAL at 08:12

## 2023-12-17 RX ADMIN — INSULIN ASPART 4 UNITS: 100 INJECTION, SOLUTION INTRAVENOUS; SUBCUTANEOUS at 07:12

## 2023-12-17 RX ADMIN — APIXABAN 5 MG: 5 TABLET, FILM COATED ORAL at 08:12

## 2023-12-17 RX ADMIN — MORPHINE SULFATE 2 MG: 2 INJECTION, SOLUTION INTRAMUSCULAR; INTRAVENOUS at 04:12

## 2023-12-17 RX ADMIN — LEVETIRACETAM 500 MG: 500 TABLET, FILM COATED ORAL at 08:12

## 2023-12-17 NOTE — NURSING
Pt scheduled to receive 21 units levemir @ 2100. Pt BG 91, on clear liquid diet and not consuming much. Due to hypoglycemia this AM, I'm concerned this is too much insulin. D/w Dr. Jeff, who advised to hold tonight's levemir dose.

## 2023-12-17 NOTE — PROGRESS NOTES
Nephrology Consult Note        Patient Name: Tabby Howard  MRN: [id number]    Patient Class: IP- Inpatient   Admission Date: 2023  Length of Stay: 4 days  Date of Service: 2023    Attending Physician: Donavon Bailey MD  Primary Care Provider: Melony Kim NP    Reason for Consult: esrd    SUBJECTIVE:     HPI: 33F ESRD on HD TTS by Dr. Figueroa, known gastroparesis and chronic pain, sickle cell trait, hypertension, pericardial effusion, type 2 diabetes admitted at [facility] for n/v and was found to be in acidosis, anemic and hyperkalemic- HD was done and 2 units were tranfused. Her abdominal pain got worse, LFTs went up and she had RRT called for lethargy, low BP, low BG. Due to concern of cholangitis, she was transferred to [facility] on  for possible MRCP. Will need HD today or tomorrow.    12/15 VSS. Could not tolerate MRCP due to panic attack. Tolerated HD yesterday. Next HD tomorrow. Got total 2 PRBC so far.   VSS. Seen on HD today, tolerating well. Appreciate heme/Onc and GI input about anemia. Will continue current plan with transfusions acutely for Hgb < 7-8 and high dose Epo with HD. I think we would all agree taht she does not have iron deficient anemia with ferritin > 6,000...   VSS. Ok to PR home.     Past Medical History:   Diagnosis Date    ESRD on hemodialysis 2022    Gastritis 2022    EGD was 22    Gastroparesis 2022    has not had Emptying study    Heart failure with preserved ejection fraction 2022    EF 55% on 3/22    History of supraventricular tachycardia     Hyperkalemia 2022    Hypertensive emergency 2022    Sickle cell trait 2022    Type 2 diabetes mellitus      Past Surgical History:   Procedure Laterality Date     SECTION      x 3    COLONOSCOPY      COLONOSCOPY N/A 2022    Procedure: COLONOSCOPY;  Surgeon: Jagdeep Cedeno MD;  Location: [facility];  Service: Endoscopy;  Laterality: N/A;     ESOPHAGOGASTRODUODENOSCOPY N/A 10/18/2019    Procedure: ESOPHAGOGASTRODUODENOSCOPY (EGD);  Surgeon: Gianluca Mendez MD;  Location: [facility] ENDO;  Service: Endoscopy;  Laterality: N/A;    ESOPHAGOGASTRODUODENOSCOPY N/A 08/24/2022    Procedure: EGD (ESOPHAGOGASTRODUODENOSCOPY);  Surgeon: Micky Paredes III, MD;  Location: [facility] ENDO;  Service: Endoscopy;  Laterality: N/A;    ESOPHAGOGASTRODUODENOSCOPY N/A 12/5/2022    Procedure: EGD (ESOPHAGOGASTRODUODENOSCOPY);  Surgeon: Marcelo Zhong MD;  Location: [facility] ENDO;  Service: Endoscopy;  Laterality: N/A;    INSERTION, CATHETER, TUNNELED N/A 6/17/2023    Procedure: Insertion,catheter,tunneled;  Surgeon: Carlos Thurman Jr., MD;  Location: [facility] OR;  Service: General;  Laterality: N/A;    LAPAROSCOPIC CHOLECYSTECTOMY N/A 07/30/2022    Procedure: CHOLECYSTECTOMY, LAPAROSCOPIC;  Surgeon: Grey Perez MD;  Location: [facility] OR;  Service: General;  Laterality: N/A;    PLACEMENT OF DUAL-LUMEN VASCULAR CATHETER Left 07/12/2022    Procedure: INSERTION-CATHETER-JOSEPH;  Surgeon: Dionte Gan MD;  Location: [facility] OR;  Service: General;  Laterality: Left;    PLACEMENT OF DUAL-LUMEN VASCULAR CATHETER Right 07/26/2022    Procedure: INSERTION-CATHETER-Hemosplit;  Surgeon: Dionte Gan MD;  Location: [facility] OR;  Service: General;  Laterality: Right;     Family History   Problem Relation Age of Onset    Diabetes Mother     Diabetes Father      Social History     Tobacco Use    Smoking status: Never    Smokeless tobacco: Never   Substance Use Topics    Alcohol use: Not Currently    Drug use: No       Review of patient's allergies indicates:   Allergen Reactions    Penicillins Hives       Outpatient meds:  No current facility-administered medications on file prior to encounter.     Current Outpatient Medications on File Prior to Encounter   Medication Sig Dispense Refill    amLODIPine (NORVASC) 5 MG tablet Take 1 tablet (5 mg total) by mouth once daily. (Patient taking  differently: Take 5 mg by mouth once daily.) 30 tablet 1    apixaban (ELIQUIS) 5 mg Tab Take 1 tablet (5 mg total) by mouth 2 (two) times daily. 180 tablet 1    carvediloL (COREG) 25 MG tablet Take 1 tablet (25 mg total) by mouth 2 (two) times daily. 60 tablet 5    cetirizine (ZYRTEC) 10 MG tablet Take 1 tablet (10 mg total) by mouth once daily. 30 tablet 0    FLUoxetine 20 MG capsule Take 1 capsule (20 mg total) by mouth once daily. 30 capsule 5    fluticasone propionate (FLONASE ALLERGY RELIEF) 50 mcg/actuation nasal spray [place] 1 spray every day by intranasal route. (Patient taking differently: 1 spray by Each Nostril route Daily.) 16 g 2    gabapentin (NEURONTIN) 300 MG capsule Take 2 capsules twice a day by oral route for 90 days. (Patient taking differently: Take 600 mg by mouth 2 (two) times daily.) 360 capsule 1    hydrALAZINE (APRESOLINE) 100 MG tablet Take 1 tablet (100 mg total) by mouth 2 (two) times daily. 180 tablet 1    insulin aspart U-100 (NOVOLOG FLEXPEN U-100 INSULIN) 100 unit/mL (3 mL) InPn pen Inject 8 units 3 times a day by subcutaneous route before meals (Patient taking differently: Inject 8 Units into the skin 3 (three) times daily before meals.) 24 mL 1    insulin detemir U-100, Levemir, (LEVEMIR FLEXTOUCH U100 INSULIN) 100 unit/mL (3 mL) InPn pen Inject 21 units every day by subcutaneous route in the evening for 90 days. (Patient taking differently: Inject 21 Units into the skin every evening.) 15 mL 1    isosorbide mononitrate (IMDUR) 30 MG 24 hr tablet Take 1 tablet (30 mg total) by mouth once daily. 90 tablet 1    levETIRAcetam (KEPPRA) 500 MG Tab Take 1 tablet twice a day by oral route for 30 days. (Patient taking differently: Take 500 mg by mouth 2 (two) times daily.) 60 tablet 2    ondansetron (ZOFRAN-ODT) 4 MG TbDL Place 1 tablet (4 mg total) under the tongue every 8 (eight) hours. 24 tablet 2    oxyCODONE-acetaminophen (PERCOCET) 5-325 mg per tablet TAKE 1 TABLET EVERY 6 HOURS AS  "NEEDED FOP PAIN (Patient taking differently: Take 1 tablet by mouth every 6 (six) hours as needed for Pain.) 15 tablet 0    pantoprazole (PROTONIX) 40 MG tablet Take 1 tablet (40 mg total) by mouth once daily. 90 tablet 1    promethazine (PHENERGAN) 25 MG tablet Take 1 tablet (25 mg total) by mouth every 6 (six) hours as needed for 5 days. (Patient taking differently: Take 25 mg by mouth every 6 (six) hours as needed for Nausea.) 20 tablet 2    sevelamer carbonate (RENVELA) 800 mg Tab Take 2 tablets with meals three times a day (Patient taking differently: Take 1,600 mg by mouth 3 (three) times daily with meals.) 180 tablet 11    sucralfate (CARAFATE) 100 mg/mL suspension Take 10 mL 4 times a day by oral route before meals for 30 days. (Patient taking differently: Take 1 g by mouth 4 (four) times daily with meals and nightly.) 1200 mL 1    [DISCONTINUED] doxycycline (VIBRAMYCIN) 100 MG Cap Take 1 capsule twice a day by oral route for 7 days, for infectoin. (Patient taking differently: Take 100 mg by mouth every 12 (twelve) hours.) 14 capsule 0    blood sugar diagnostic (TRUE METRIX GLUCOSE TEST STRIP) Strp Use 1 strip to check blood glucose three times daily 100 each 11    blood-glucose meter (TRUE METRIX GLUCOSE METER) Misc Use to check blood glucose 1 each 0    lancets (TRUEPLUS LANCETS) 33 gauge Misc Use 1 to check blood glucose three times daily 100 each 11    lancets 33 gauge Misc Use to test twice daily 100 each 5    pen needle, diabetic 31 gauge x 3/16" Ndle Use as directed to inject insulin 5 times daily 100 each 11    sucroferric oxyhydroxide (VELPHORO) 500 mg Chew Take 1 tablet 3 times a day (Patient taking differently: Take 1 tablet by mouth 3 (three) times daily.) 90 tablet 6    [DISCONTINUED] amLODIPine (NORVASC) 5 MG tablet Take 1 tablet every day by oral route for 90 days. 90 tablet 1    [DISCONTINUED] atenoloL (TENORMIN) 50 MG tablet Take 1 tablet (50 mg total) by mouth every other day. 45 tablet 3 "    [DISCONTINUED] brimonidine 0.2% (ALPHAGAN) 0.2 % Drop Place 1 drop in right eye twice daily. 10 mL 5    [DISCONTINUED] carvediloL (COREG) 25 MG tablet Take 1 tablet (25 mg total) by mouth 2 (two) times daily. 60 tablet 2    [DISCONTINUED] ciprofloxacin HCl (CILOXAN) 0.3 % ophthalmic solution instill 1 drop into the right eye 4 times a day for 30 days 5 mL 0    [DISCONTINUED] FLUoxetine 20 MG capsule Take 1 capsule (20 mg total) by mouth once daily. 90 capsule 1    [DISCONTINUED] gabapentin (NEURONTIN) 300 MG capsule Take 2 capsules (600 mg total) by mouth 2 (two) times daily. 360 capsule 1    [DISCONTINUED] insulin aspart U-100 (NOVOLOG FLEXPEN U-100 INSULIN) 100 unit/mL (3 mL) InPn pen Inject 10 units 3 times a day by subcutaneous route before meals for 90 days. 27 mL 2    [DISCONTINUED] insulin detemir U-100, Levemir, (LEVEMIR FLEXTOUCH U100 INSULIN) 100 unit/mL (3 mL) InPn pen Inject 18 units every day by subcutaneous route in the evening 18 mL 1    [DISCONTINUED] insulin detemir U-100, Levemir, (LEVEMIR FLEXTOUCH U100 INSULIN) 100 unit/mL (3 mL) InPn pen Inject 22 units every day by subcutaneous route in the evening for 90 days. 18 mL 1    [DISCONTINUED] isosorbide mononitrate (IMDUR) 30 MG 24 hr tablet Take 1 tablet (30 mg total) by mouth once daily. 30 tablet 1    [DISCONTINUED] ketorolac 0.5% (ACULAR) 0.5 % Drop Instill 1 drop into the right eye 4 times a day for 30 days 3 mL 5    [DISCONTINUED] levETIRAcetam (KEPPRA) 500 MG Tab Take 1 tablet (500 mg total) by mouth 2 (two) times daily. 60 tablet 2    [DISCONTINUED] omeprazole (PRILOSEC) 20 MG capsule Take 2 capsules (40 mg total) by mouth once daily. for 10 days 20 capsule 0    [DISCONTINUED] ondansetron (ZOFRAN-ODT) 4 MG TbDL Dissolve 1 tablet (4 mg total) by mouth every 8 (eight) hours. 24 tablet 2    [DISCONTINUED] prednisoLONE acetate (PRED FORTE) 1 % DrpS Instill 1 drops into the right eye 4 times a day for 30 days 5 mL 1    [DISCONTINUED]  promethazine (PHENERGAN) 25 MG suppository Place 1 suppository (25 mg total) rectally every 6 (six) hours as needed for Nausea. 10 suppository 0    [DISCONTINUED] timolol maleate 0.5% (TIMOPTIC) 0.5 % Drop Instill 1 drop into the right eye 2 times a day for 30 days 5 mL 6       Scheduled meds:   amLODIPine  5 mg Oral Daily    apixaban  5 mg Oral BID    carvediloL  25 mg Oral BID    epoetin teresa-epbx  100 Units/kg Subcutaneous Every Tues, Thurs, Sat    FLUoxetine  20 mg Oral Daily    gabapentin  600 mg Oral BID    hydrALAZINE  100 mg Oral BID    insulin aspart U-100  8 Units Subcutaneous TID AC    insulin detemir U-100 (Levemir)  21 Units Subcutaneous QHS    isosorbide mononitrate  30 mg Oral Daily    levETIRAcetam  500 mg Oral BID    mupirocin   Nasal BID    polyethylene glycol  17 g Oral Daily       Infusions:        PRN meds:  acetaminophen, albuterol-ipratropium, aluminum-magnesium hydroxide-simethicone, dextrose 10%, dextrose 10%, dextrose 50%, dextrose 50%, glucagon (human recombinant), glucose, glucose, haloperidol lactate, insulin aspart U-100, melatonin, morphine, naloxone, ondansetron, prochlorperazine, simethicone, sodium chloride 0.9%    Review of Systems:    OBJECTIVE:     Vital Signs and IO:  Temp:  [97.6 °F (36.4 °C)-98.6 °F (37 °C)]   Pulse:  [68-82]   Resp:  [17-19]   BP: ()/(51-94)   SpO2:  [97 %-99 %]   I/O last 3 completed shifts:  In: 600 [Other:500; IV Piggyback:100]  Out: 1800 [Other:1800]  Wt Readings from Last 5 Encounters:   12/15/23 51.9 kg (114 lb 6.7 oz)   10/18/23 61.8 kg (136 lb 3.9 oz)   10/13/23 59.4 kg (130 lb 15.3 oz)   10/03/23 60.3 kg (132 lb 15 oz)   09/19/23 48.9 kg (107 lb 12.9 oz)     Body mass index is 20.93 kg/m².    Physical Exam  Constitutional:       General: Patient is not in acute distress.     Appearance: Patient is well-developed. She is not diaphoretic.   HENT:      Head: Normocephalic and atraumatic.      Mouth/Throat: Mucous membranes are moist.   Eyes:       General: No scleral icterus.     Pupils: Pupils are equal, round, and reactive to light.   Cardiovascular:      Rate and Rhythm: Normal rate and regular rhythm.   Pulmonary:      Effort: Pulmonary effort is normal. No respiratory distress.      Breath sounds: No stridor.   Abdominal:      General: There is no distension.      Palpations: Abdomen is soft.   Musculoskeletal:         General: No deformity. Normal range of motion.      Cervical back: Neck supple.   Skin:     General: Skin is warm and dry.      Findings: No rash present. No erythema.   Neurological:      Mental Status: Patient is alert and oriented to person, place, and time.      Cranial Nerves: No cranial nerve deficit.   Psychiatric:         Behavior: Behavior normal.     Laboratory:  Recent Labs   Lab 12/15/23  0543 12/16/23  0540 12/17/23  0532    139 139   K 4.1 4.6 4.0    103 103   CO2 19* 24 24   BUN 35* 60* 29*   CREATININE 6.0* 8.4* 5.5*   * 54* 230*         Recent Labs   Lab 12/15/23  0543 12/16/23  0540 12/17/23  0532   CALCIUM 9.1 8.5* 8.8   ALBUMIN 4.0 3.6 3.8   PHOS 5.4* 7.1* 4.6*   MG 2.2 2.3 2.1         Recent Labs   Lab 07/08/22  0516   PTH, Intact 289.8 H         Recent Labs   Lab 12/12/23  0906 12/12/23  2131 12/13/23  0742 12/13/23  1129 12/13/23  1545 12/13/23  1601 12/13/23  1620 12/13/23  1726   POCTGLUCOSE 307* 132* 176* 165* 34* 194* 183* 167*         Recent Labs   Lab 12/23/22  1413 03/03/23  0547 08/01/23  0813   Hemoglobin A1C 7.5 H 5.8 5.8         Recent Labs   Lab 12/15/23  0543 12/16/23  0540 12/17/23  0532   WBC 11.75 9.03 5.83   HGB 8.7* 8.5* 9.8*   HCT 25.6* 25.5* 30.1*   PLT 85* 60* 80*   MCV 88 89 89   MCHC 34.0 33.3 32.6   MONO 10.2  1.2* 13.7  1.2* 10.6  0.6   EOSINOPHIL 3.1 1.6 3.3         Recent Labs   Lab 12/15/23  0543 12/16/23  0540 12/17/23  0532   BILITOT 1.7* 1.6* 2.0*   PROT 7.4 6.9 7.0   ALBUMIN 4.0 3.6 3.8   ALKPHOS 202* 173* 170*   ALT 83* 57* 43   AST 48* 29 21         Recent Labs    Lab 03/24/21  1826 03/25/21  1910 03/16/22  1848 05/15/22  2022 02/05/23  0156 09/19/23  1422 10/13/23  1001   Color, UA Yellow   < > Pale Yellow   < > Yellow Yellow Yellow   Appearance, UA  --    < >  --    < > Clear Clear Clear   pH, UA  --    < >  --    < > >8.0 A >8.0 A 8.0   Specific [place], UA  --    < >  --    < > 1.020 1.020 1.015   Protein, UA  --    < >  --    < > 3+ A 4+ 4+   Glucose, UA >=500 mg/dL A   < > 50 mg/dL A   < > 2+ A 4+ A 3+ A   Ketones, UA  --    < >  --    < > 1+ A Trace A Trace A   Urobilinogen, UA Normal   < > Normal   < > Negative Negative Negative   Bilirubin (UA) Negative   < > Negative   < > 2+ A Negative 1+ A   Occult Blood UA  --    < >  --    < > 3+ A 2+ A Negative   Blood, UA >1.0 mg/dL A   < > 0.03 mg/dL A   < >  --   --   --    Nitrite, UA  --    < >  --    < > Negative Negative Negative   RBC, UA  --    < >  --    < > 15 H 17 H 8 H   WBC, UA  --    < >  --    < > 1 2 5   Bacteria  --    < >  --    < > Rare Negative Negative   Mucus, UA Rare A  --  Rare A  --   --   --   --    Hyaline Casts, UA  --    < >  --    < > 2 A 1 2 A    < > = values in this interval not displayed.         Recent Labs   Lab 10/03/23  2105 10/04/23  0438 10/15/23  0353   POC PH 7.290 LL 7.305 L 7.239 LL   POC PCO2 45.2 H 43.6 37.8   POC HCO3 21.7 L 21.7 L 16.1 L   POC PO2 110 H 92 37 LL   POC SATURATED O2 98 96 60   POC BE -5 -5 -11   Sample ARTERIAL ARTERIAL VENOUS         Microbiology Results (last 7 days)       ** No results found for the last 168 hours. **            ASSESSMENT/PLAN:     ESRD on HD TTS via AVF  Next HD per schedule.  Continue current dialysis prescription.  Renal diet - low K, low phos.  No IVs or BP checks on access and/or non-dominant arm.    Anemia of CKD  Hgb and HCT are acceptable. Monitor for now.  Will provide SHELLEY with HD.  s/p blood transfusion, will give blood PRN.    MBD / Secondary HPT  Ca, Phos, PTH and vitamin D levels are acceptable.   Phos binders, vitamin D and  analogues, calcimimetics will be given as needed.    HTN  BP seem controlled.   Tolerate asymptomatic HTN up to -160.  Continue home meds.  Low sodium diet.    Chronic abdominal pain  Management per GI and primary team.    Thank you for allowing us to participate in the care of your patient!   We will follow the patient and provide recommendations as needed.    Patient care time was spent personally by me on the following activities:     Obtaining a history.  Examination of patient.  Providing medical care at the patients bedside.  Developing a treatment plan with patient or surrogate and bedside caregivers.  Ordering and reviewing laboratory studies, radiographic studies, pulse oximetry.  Ordering and performing treatments and interventions.  Evaluation of patient's response to treatment.  Discussions with consultants while on the unit and immediately available to the patient.  Re-evaluation of the patient's condition.  Documentation in the medical record.     Mando Benitez MD    [facility]  [address] [place] [address]  [place] [address]    [phone] - tel  [phone] - fax    12/17/2023

## 2023-12-17 NOTE — CARE UPDATE
12/17/23 0737   Patient Assessment/Suction   Level of Consciousness (AVPU) alert   Respiratory Effort Normal;Unlabored   Expansion/Accessory Muscles/Retractions no use of accessory muscles;no retractions;expansion symmetric   All Lung Fields Breath Sounds clear   Rhythm/Pattern, Respiratory unlabored;pattern regular;depth regular;chest wiggle adequate;no shortness of breath reported   Cough Frequency no cough   PRE-TX-O2   Device (Oxygen Therapy) room air   SpO2 98 %   Pulse Oximetry Type Intermittent   $ Pulse Oximetry - Multiple Charge Pulse Oximetry - Multiple   Pulse 82   Resp 19   Aerosol Therapy   $ Aerosol Therapy Charges PRN treatment not required   Education   $ Education Bronchodilator;15 min

## 2023-12-17 NOTE — PLAN OF CARE
12/17/23 0909   Final Note   Assessment Type Final Discharge Note   Anticipated Discharge Disposition Home   Post-Acute Status   Discharge Delays None known at this time     Patient cleared for discharge from case management standpoint.    Chart and discharge orders reviewed.  Patient discharged home with no further case management needs.

## 2023-12-19 LAB
ACTIN IGG SERPL-ACNC: 10 UNITS (ref 0–19)
MITOCHONDRIA M2 IGG SER-ACNC: <20 UNITS (ref 0–20)

## 2024-01-09 NOTE — ED NOTES
Patient has been updated on plan of care and lab results. No needs or questions at this time.   Patient has been encouraged to lay flat and not bend her arm. Patient's 20G IV to right AC infiltrated. Patient verbalized understanding.    Yes

## 2024-01-17 ENCOUNTER — HOSPITAL ENCOUNTER (EMERGENCY)
Facility: HOSPITAL | Age: 34
Discharge: HOME | End: 2024-01-17
Attending: EMERGENCY MEDICINE
Payer: MEDICAID

## 2024-01-17 VITALS
HEART RATE: 88 BPM | OXYGEN SATURATION: 100 % | TEMPERATURE: 99 F | WEIGHT: 119 LBS | DIASTOLIC BLOOD PRESSURE: 84 MMHG | RESPIRATION RATE: 18 BRPM | HEIGHT: 62 IN | BODY MASS INDEX: 21.9 KG/M2 | SYSTOLIC BLOOD PRESSURE: 165 MMHG

## 2024-01-17 DIAGNOSIS — R10.30 LOWER ABDOMINAL PAIN: ICD-10-CM

## 2024-01-17 DIAGNOSIS — E16.2 HYPOGLYCEMIA: ICD-10-CM

## 2024-01-17 DIAGNOSIS — R11.2 NAUSEA AND VOMITING, UNSPECIFIED VOMITING TYPE: Primary | ICD-10-CM

## 2024-01-17 LAB
ALBUMIN SERPL BCP-MCNC: 3.7 G/DL (ref 3.5–5.2)
ALP SERPL-CCNC: 140 U/L (ref 55–135)
ALT SERPL W/O P-5'-P-CCNC: 17 U/L (ref 10–44)
ANION GAP SERPL CALC-SCNC: 16 MMOL/L (ref 8–16)
AST SERPL-CCNC: 23 U/L (ref 10–40)
BASOPHILS # BLD AUTO: 0.05 K/UL (ref 0–0.2)
BASOPHILS NFR BLD: 0.6 % (ref 0–1.9)
BILIRUB SERPL-MCNC: 1.6 MG/DL (ref 0.1–1)
BUN SERPL-MCNC: 48 MG/DL (ref 6–20)
CALCIUM SERPL-MCNC: 9.2 MG/DL (ref 8.7–10.5)
CHLORIDE SERPL-SCNC: 102 MMOL/L (ref 95–110)
CO2 SERPL-SCNC: 22 MMOL/L (ref 23–29)
CREAT SERPL-MCNC: 8.4 MG/DL (ref 0.5–1.4)
DIFFERENTIAL METHOD BLD: ABNORMAL
EOSINOPHIL # BLD AUTO: 0.5 K/UL (ref 0–0.5)
EOSINOPHIL NFR BLD: 5.5 % (ref 0–8)
ERYTHROCYTE [DISTWIDTH] IN BLOOD BY AUTOMATED COUNT: 16.4 % (ref 11.5–14.5)
EST. GFR  (NO RACE VARIABLE): 6 ML/MIN/1.73 M^2
GLUCOSE SERPL-MCNC: 39 MG/DL (ref 70–110)
HCT VFR BLD AUTO: 26.9 % (ref 37–48.5)
HGB BLD-MCNC: 9.4 G/DL (ref 12–16)
IMM GRANULOCYTES # BLD AUTO: 0.03 K/UL (ref 0–0.04)
IMM GRANULOCYTES NFR BLD AUTO: 0.4 % (ref 0–0.5)
LYMPHOCYTES # BLD AUTO: 0.7 K/UL (ref 1–4.8)
LYMPHOCYTES NFR BLD: 8.9 % (ref 18–48)
MAGNESIUM SERPL-MCNC: 2.5 MG/DL (ref 1.6–2.6)
MCH RBC QN AUTO: 29.1 PG (ref 27–31)
MCHC RBC AUTO-ENTMCNC: 34.9 G/DL (ref 32–36)
MCV RBC AUTO: 83 FL (ref 82–98)
MONOCYTES # BLD AUTO: 0.4 K/UL (ref 0.3–1)
MONOCYTES NFR BLD: 4.9 % (ref 4–15)
NEUTROPHILS # BLD AUTO: 6.6 K/UL (ref 1.8–7.7)
NEUTROPHILS NFR BLD: 79.7 % (ref 38–73)
NRBC BLD-RTO: 0 /100 WBC
PHOSPHATE SERPL-MCNC: 4.6 MG/DL (ref 2.7–4.5)
PLATELET # BLD AUTO: 94 K/UL (ref 150–450)
PMV BLD AUTO: 9.9 FL (ref 9.2–12.9)
POCT GLUCOSE: 185 MG/DL (ref 70–110)
POCT GLUCOSE: 36 MG/DL (ref 70–110)
POCT GLUCOSE: 73 MG/DL (ref 70–110)
POCT GLUCOSE: 77 MG/DL (ref 70–110)
POTASSIUM SERPL-SCNC: 4.2 MMOL/L (ref 3.5–5.1)
PROT SERPL-MCNC: 7.8 G/DL (ref 6–8.4)
RBC # BLD AUTO: 3.23 M/UL (ref 4–5.4)
SODIUM SERPL-SCNC: 140 MMOL/L (ref 136–145)
WBC # BLD AUTO: 8.32 K/UL (ref 3.9–12.7)

## 2024-01-17 PROCEDURE — 96375 TX/PRO/DX INJ NEW DRUG ADDON: CPT

## 2024-01-17 PROCEDURE — 84100 ASSAY OF PHOSPHORUS: CPT | Performed by: EMERGENCY MEDICINE

## 2024-01-17 PROCEDURE — 85025 COMPLETE CBC W/AUTO DIFF WBC: CPT | Performed by: EMERGENCY MEDICINE

## 2024-01-17 PROCEDURE — 80053 COMPREHEN METABOLIC PANEL: CPT | Performed by: EMERGENCY MEDICINE

## 2024-01-17 PROCEDURE — 36415 COLL VENOUS BLD VENIPUNCTURE: CPT | Performed by: EMERGENCY MEDICINE

## 2024-01-17 PROCEDURE — 25000003 PHARM REV CODE 250: Performed by: EMERGENCY MEDICINE

## 2024-01-17 PROCEDURE — 82962 GLUCOSE BLOOD TEST: CPT

## 2024-01-17 PROCEDURE — 63600175 PHARM REV CODE 636 W HCPCS: Performed by: EMERGENCY MEDICINE

## 2024-01-17 PROCEDURE — 96374 THER/PROPH/DIAG INJ IV PUSH: CPT

## 2024-01-17 PROCEDURE — 83735 ASSAY OF MAGNESIUM: CPT | Performed by: EMERGENCY MEDICINE

## 2024-01-17 PROCEDURE — 99284 EMERGENCY DEPT VISIT MOD MDM: CPT

## 2024-01-17 RX ORDER — DROPERIDOL 2.5 MG/ML
2.5 INJECTION, SOLUTION INTRAMUSCULAR; INTRAVENOUS
Status: COMPLETED | OUTPATIENT
Start: 2024-01-17 | End: 2024-01-17

## 2024-01-17 RX ADMIN — DEXTROSE MONOHYDRATE 25 G: 25 INJECTION, SOLUTION INTRAVENOUS at 12:01

## 2024-01-17 RX ADMIN — DROPERIDOL 2.5 MG: 2.5 INJECTION, SOLUTION INTRAMUSCULAR; INTRAVENOUS at 01:01

## 2024-01-17 NOTE — ED NOTES
Patient asleep at this time with even and unlabored respirations. Patient in no distress at this time. Vss/nadn

## 2024-01-17 NOTE — ED PROVIDER NOTES
Encounter Date: 2024       History     Chief Complaint   Patient presents with    Abdominal Pain    Nausea    Vomiting     HPI 33-year-old female with history of ESRD on HD, suspected gastroparesis, sickle cell trait, hypertension, pericardial effusion, type 2 diabetes due to the weather presents emergency department for waking up this morning around 2:00 a.m. with severe lower abdominal pain with associated nausea and vomiting.  Review of patient's allergies indicates:   Allergen Reactions    Penicillins Hives     Past Medical History:   Diagnosis Date    ESRD on hemodialysis 2022    Gastritis 2022    EGD was 22    Gastroparesis 2022    has not had Emptying study    Heart failure with preserved ejection fraction 2022    EF 55% on 3/22    History of supraventricular tachycardia     Hyperkalemia 2022    Hypertensive emergency 2022    Sickle cell trait 2022    Type 2 diabetes mellitus      Past Surgical History:   Procedure Laterality Date     SECTION      x 3    COLONOSCOPY      COLONOSCOPY N/A 2022    Procedure: COLONOSCOPY;  Surgeon: Jagdeep Cedeno MD;  Location: [facility];  Service: Endoscopy;  Laterality: N/A;    ENDOSCOPIC ULTRASOUND OF UPPER GASTROINTESTINAL TRACT N/A 2023    Procedure: ULTRASOUND, UPPER GI TRACT, ENDOSCOPIC;  Surgeon: Micky Paredes III, MD;  Location: [facility];  Service: Endoscopy;  Laterality: N/A;    ESOPHAGOGASTRODUODENOSCOPY N/A 10/18/2019    Procedure: ESOPHAGOGASTRODUODENOSCOPY (EGD);  Surgeon: Gianluca Mendez MD;  Location: [facility];  Service: Endoscopy;  Laterality: N/A;    ESOPHAGOGASTRODUODENOSCOPY N/A 2022    Procedure: EGD (ESOPHAGOGASTRODUODENOSCOPY);  Surgeon: Micky Paredes III, MD;  Location: [facility];  Service: Endoscopy;  Laterality: N/A;    ESOPHAGOGASTRODUODENOSCOPY N/A 2022    Procedure: EGD (ESOPHAGOGASTRODUODENOSCOPY);  Surgeon: Marcelo Zhong MD;  Location: [facility];   Service: Endoscopy;  Laterality: N/A;    INSERTION, CATHETER, TUNNELED N/A 6/17/2023    Procedure: Insertion,catheter,tunneled;  Surgeon: Carlos Thurman Jr., MD;  Location: [facility] OR;  Service: General;  Laterality: N/A;    LAPAROSCOPIC CHOLECYSTECTOMY N/A 07/30/2022    Procedure: CHOLECYSTECTOMY, LAPAROSCOPIC;  Surgeon: Grey Perez MD;  Location: [facility] OR;  Service: General;  Laterality: N/A;    PLACEMENT OF DUAL-LUMEN VASCULAR CATHETER Left 07/12/2022    Procedure: INSERTION-CATHETER-JOSEPH;  Surgeon: Dionte Gan MD;  Location: [facility] OR;  Service: General;  Laterality: Left;    PLACEMENT OF DUAL-LUMEN VASCULAR CATHETER Right 07/26/2022    Procedure: INSERTION-CATHETER-Hemosplit;  Surgeon: Dionte Gan MD;  Location: [facility] OR;  Service: General;  Laterality: Right;     Family History   Problem Relation Age of Onset    Diabetes Mother     Diabetes Father      Social History     Tobacco Use    Smoking status: Never    Smokeless tobacco: Never   Substance Use Topics    Alcohol use: Not Currently    Drug use: No     Review of Systems   Constitutional:  Negative for fever.   HENT:  Negative for sore throat.    Respiratory:  Negative for shortness of breath.    Cardiovascular:  Negative for chest pain.   Gastrointestinal:  Positive for abdominal pain, nausea and vomiting.   Genitourinary:  Negative for dysuria.   Musculoskeletal:  Negative for back pain.   Skin:  Negative for rash.   Neurological:  Negative for weakness.   Hematological:  Does not bruise/bleed easily.       Physical Exam     Initial Vitals [01/17/24 0043]   BP Pulse Resp Temp SpO2   (!) 166/108 92 20 98.4 °F (36.9 °C) 99 %      MAP       --         Physical Exam    Nursing note and vitals reviewed.  Constitutional: She appears well-developed and well-nourished. She appears distressed (crying, writhing around in pain).   HENT:   Head: Normocephalic and atraumatic.   Eyes: EOM are normal. Pupils are equal, round, and reactive to light.    Neck: Neck supple.   Cardiovascular:  Normal rate, regular rhythm and intact distal pulses.           Hemo splint and right upper chest.  AV fistula left upper arm with palpable thrill.   Pulmonary/Chest: Breath sounds normal. No respiratory distress.   Abdominal: Abdomen is soft. There is abdominal tenderness (lower). There is no rebound and no guarding.   Musculoskeletal:         General: Normal range of motion.      Cervical back: Neck supple.     Neurological: She is alert and oriented to person, place, and time.   Skin: Skin is warm and dry.         ED Course   Procedures  Labs Reviewed   CBC W/ AUTO DIFFERENTIAL - Abnormal; Notable for the following components:       Result Value    RBC 3.23 (*)     Hemoglobin 9.4 (*)     Hematocrit 26.9 (*)     RDW 16.4 (*)     Platelets 94 (*)     Lymph # 0.7 (*)     Gran % 79.7 (*)     Lymph % 8.9 (*)     All other components within normal limits   COMPREHENSIVE METABOLIC PANEL - Abnormal; Notable for the following components:    CO2 22 (*)     Glucose 39 (*)     BUN 48 (*)     Creatinine 8.4 (*)     Total Bilirubin 1.6 (*)     Alkaline Phosphatase 140 (*)     eGFR 6 (*)     All other components within normal limits    Narrative:      glucose  critical result(s) called and verbal readback obtained from   sadie thomas by CPW1 01/17/2024 01:56   PHOSPHORUS - Abnormal; Notable for the following components:    Phosphorus 4.6 (*)     All other components within normal limits   POCT GLUCOSE - Abnormal; Notable for the following components:    POCT Glucose 36 (*)     All other components within normal limits   POCT GLUCOSE - Abnormal; Notable for the following components:    POCT Glucose 185 (*)     All other components within normal limits   MAGNESIUM   POCT GLUCOSE   POCT GLUCOSE   POCT GLUCOSE MONITORING CONTINUOUS          Imaging Results    None          Medications   dextrose 50% injection 25 g (25 g Intravenous Given 1/17/24 0057)   droPERidol injection 2.5 mg (2.5 mg  Intravenous Given 1/17/24 0137)     Medical Decision Making  33-year-old female with history of ESRD on HD, suspected gastroparesis, sickle cell trait, hypertension, pericardial effusion, type 2 diabetes due to the weather presents emergency department for waking up this morning around 2:00 a.m. with severe lower abdominal pain with associated nausea and vomiting.  Differential diagnosis includes diabetic gastroparesis, acute pancreatitis, dehydration, hyperkalemia electrolyte abnormality from missing dialysis.  Patient is found to be hypoglycemic with a glucose of 36 and given D50 with resolution of her hypoglycemia.  Total bilirubin mildly elevated 1.6 with alk-phos of 140.  She has no upper abdominal tenderness to raise suspicion for acute cholecystitis.  She is normal white blood cell count of 8 K with hemoglobin of 9.4.  Patient was given droperidol with resolution of her nausea and abdominal pain.  On re-evaluation all of her symptoms had subsided.  I do not think she needs admission to the hospital.  She tolerated oral intake and drank apple juice.  Repeat glucose was 77.  She is appropriate for discharge home.  Return precautions for any recurrent or worsening symptoms.  Discharged in no acute distress.    Amount and/or Complexity of Data Reviewed  Labs: ordered. Decision-making details documented in ED Course.    Risk  Prescription drug management.               ED Course as of 01/17/24 0618   Wed Jan 17, 2024   0254 Phosphorus Level(!): 4.6 [AS]   0255 POCT Glucose: 73 [AS]   0255 Magnesium : 2.5 [AS]   0255 Sodium: 140 [AS]   0255 Potassium: 4.2 [AS]   0255 CO2(!): 22 [AS]   0255 Glucose(!!): 39 [AS]   0255 BUN(!): 48 [AS]   0255 Creatinine(!): 8.4 [AS]   0255 BILIRUBIN TOTAL(!): 1.6 [AS]   0255 ALP(!): 140 [AS]   0255 WBC: 8.32 [AS]   0255 Hematocrit(!): 26.9 [AS]   0255 POCT Glucose: 73  Patient's symptoms have subsided completely.  She feels much improved. [AS]      ED Course User Index  [AS] Bruce  Gurmeet EPSTEIN MD                           Clinical Impression:  Final diagnoses:  [R11.2] Nausea and vomiting, unspecified vomiting type (Primary)  [R10.30] Lower abdominal pain  [E16.2] Hypoglycemia          ED Disposition Condition    Discharge Stable          ED Prescriptions    None       Follow-up Information       Follow up With Specialties Details Why Contact Info Additional Information    [facility] ED Emergency Medicine  As needed, If symptoms worsen [address] [place] [address] [address] [place] [address] 1st floor    Melony Kim, NP Nurse Practitioner   [address] [place] [id number]  [phone]                Gurmeet Barrera MD  01/17/24 0618

## 2024-01-18 DIAGNOSIS — R68.89 OTHER GENERAL SYMPTOMS AND SIGNS: Primary | ICD-10-CM

## 2024-01-21 ENCOUNTER — HOSPITAL ENCOUNTER (EMERGENCY)
Facility: HOSPITAL | Age: 34
Discharge: HOME | End: 2024-01-21
Attending: EMERGENCY MEDICINE
Payer: MEDICAID

## 2024-01-21 VITALS
TEMPERATURE: 98 F | SYSTOLIC BLOOD PRESSURE: 174 MMHG | OXYGEN SATURATION: 98 % | HEART RATE: 80 BPM | HEIGHT: 62 IN | DIASTOLIC BLOOD PRESSURE: 83 MMHG | RESPIRATION RATE: 14 BRPM | BODY MASS INDEX: 21.9 KG/M2 | WEIGHT: 119 LBS

## 2024-01-21 DIAGNOSIS — G89.29 CHRONIC ABDOMINAL PAIN: Primary | ICD-10-CM

## 2024-01-21 DIAGNOSIS — R10.9 CHRONIC ABDOMINAL PAIN: Primary | ICD-10-CM

## 2024-01-21 LAB
ALBUMIN SERPL BCP-MCNC: 4.2 G/DL (ref 3.5–5.2)
ALP SERPL-CCNC: 150 U/L (ref 55–135)
ALT SERPL W/O P-5'-P-CCNC: 14 U/L (ref 10–44)
ANION GAP SERPL CALC-SCNC: 21 MMOL/L (ref 8–16)
AST SERPL-CCNC: 13 U/L (ref 10–40)
BASOPHILS # BLD AUTO: 0.06 K/UL (ref 0–0.2)
BASOPHILS NFR BLD: 1 % (ref 0–1.9)
BILIRUB SERPL-MCNC: 2.3 MG/DL (ref 0.1–1)
BUN SERPL-MCNC: 19 MG/DL (ref 6–20)
CALCIUM SERPL-MCNC: 10.1 MG/DL (ref 8.7–10.5)
CHLORIDE SERPL-SCNC: 95 MMOL/L (ref 95–110)
CO2 SERPL-SCNC: 25 MMOL/L (ref 23–29)
CREAT SERPL-MCNC: 5 MG/DL (ref 0.5–1.4)
DIFFERENTIAL METHOD BLD: ABNORMAL
EOSINOPHIL # BLD AUTO: 0.6 K/UL (ref 0–0.5)
EOSINOPHIL NFR BLD: 10.2 % (ref 0–8)
ERYTHROCYTE [DISTWIDTH] IN BLOOD BY AUTOMATED COUNT: 15.8 % (ref 11.5–14.5)
EST. GFR  (NO RACE VARIABLE): 11 ML/MIN/1.73 M^2
GLUCOSE SERPL-MCNC: 382 MG/DL (ref 70–110)
HCT VFR BLD AUTO: 25.8 % (ref 37–48.5)
HGB BLD-MCNC: 8.7 G/DL (ref 12–16)
IMM GRANULOCYTES # BLD AUTO: 0.03 K/UL (ref 0–0.04)
IMM GRANULOCYTES NFR BLD AUTO: 0.5 % (ref 0–0.5)
LIPASE SERPL-CCNC: 27 U/L (ref 4–60)
LYMPHOCYTES # BLD AUTO: 0.8 K/UL (ref 1–4.8)
LYMPHOCYTES NFR BLD: 12.3 % (ref 18–48)
MCH RBC QN AUTO: 28.5 PG (ref 27–31)
MCHC RBC AUTO-ENTMCNC: 33.7 G/DL (ref 32–36)
MCV RBC AUTO: 85 FL (ref 82–98)
MONOCYTES # BLD AUTO: 0.4 K/UL (ref 0.3–1)
MONOCYTES NFR BLD: 7.1 % (ref 4–15)
NEUTROPHILS # BLD AUTO: 4.2 K/UL (ref 1.8–7.7)
NEUTROPHILS NFR BLD: 68.9 % (ref 38–73)
NRBC BLD-RTO: 0 /100 WBC
PLATELET # BLD AUTO: 130 K/UL (ref 150–450)
PLATELET BLD QL SMEAR: ABNORMAL
PMV BLD AUTO: 10.1 FL (ref 9.2–12.9)
POCT GLUCOSE: 429 MG/DL (ref 70–110)
POTASSIUM SERPL-SCNC: 3.9 MMOL/L (ref 3.5–5.1)
PROT SERPL-MCNC: 8.5 G/DL (ref 6–8.4)
RBC # BLD AUTO: 3.05 M/UL (ref 4–5.4)
SODIUM SERPL-SCNC: 141 MMOL/L (ref 136–145)
WBC # BLD AUTO: 6.09 K/UL (ref 3.9–12.7)

## 2024-01-21 PROCEDURE — 83690 ASSAY OF LIPASE: CPT | Performed by: EMERGENCY MEDICINE

## 2024-01-21 PROCEDURE — 63600175 PHARM REV CODE 636 W HCPCS: Performed by: PHYSICIAN ASSISTANT

## 2024-01-21 PROCEDURE — 99284 EMERGENCY DEPT VISIT MOD MDM: CPT | Mod: 25

## 2024-01-21 PROCEDURE — 96375 TX/PRO/DX INJ NEW DRUG ADDON: CPT

## 2024-01-21 PROCEDURE — 36415 COLL VENOUS BLD VENIPUNCTURE: CPT | Performed by: EMERGENCY MEDICINE

## 2024-01-21 PROCEDURE — 82962 GLUCOSE BLOOD TEST: CPT

## 2024-01-21 PROCEDURE — 85025 COMPLETE CBC W/AUTO DIFF WBC: CPT | Performed by: EMERGENCY MEDICINE

## 2024-01-21 PROCEDURE — 80053 COMPREHEN METABOLIC PANEL: CPT | Performed by: EMERGENCY MEDICINE

## 2024-01-21 PROCEDURE — 96374 THER/PROPH/DIAG INJ IV PUSH: CPT

## 2024-01-21 RX ORDER — DROPERIDOL 2.5 MG/ML
2.5 INJECTION, SOLUTION INTRAMUSCULAR; INTRAVENOUS
Status: COMPLETED | OUTPATIENT
Start: 2024-01-21 | End: 2024-01-21

## 2024-01-21 RX ORDER — ONDANSETRON HYDROCHLORIDE 2 MG/ML
4 INJECTION, SOLUTION INTRAVENOUS
Status: COMPLETED | OUTPATIENT
Start: 2024-01-21 | End: 2024-01-21

## 2024-01-21 RX ORDER — MORPHINE SULFATE 2 MG/ML
6 INJECTION, SOLUTION INTRAMUSCULAR; INTRAVENOUS
Status: COMPLETED | OUTPATIENT
Start: 2024-01-21 | End: 2024-01-21

## 2024-01-21 RX ADMIN — MORPHINE SULFATE 6 MG: 2 INJECTION, SOLUTION INTRAMUSCULAR; INTRAVENOUS at 12:01

## 2024-01-21 RX ADMIN — ONDANSETRON 4 MG: 2 INJECTION INTRAMUSCULAR; INTRAVENOUS at 12:01

## 2024-01-21 RX ADMIN — DROPERIDOL 2.5 MG: 2.5 INJECTION, SOLUTION INTRAMUSCULAR; INTRAVENOUS at 10:01

## 2024-01-21 NOTE — DISCHARGE INSTRUCTIONS
Continue your routine home medications. Monitor your blood sugar at home. Continue your routine HD. Follow up with your primary care provider and establish care with a GI doctor if you don't have one. For worsening symptoms, chest pain, shortness of breath, increased abdominal pain, high grade fever, stroke or stroke like symptoms, immediately go to the nearest Emergency Room or call 911 as soon as possible.

## 2024-01-21 NOTE — ED PROVIDER NOTES
Encounter Date: 2024       History     Chief Complaint   Patient presents with    Abdominal Pain    Nausea    Vomiting    Hyperglycemia     Patient is a 34-year-old female who presents with nausea, vomiting abdominal pain that started last night.  She has past medical history significant for end-stage renal disease on hemodialysis, gastroparesis, hyperkalemia and sickle cell trait.  She also has past medical history for type 2 diabetes.  She does not make urine.  She reports normal bowel movement yesterday.  She reports symptoms consistent with previous abdominal issues.  She denies fevers.  She reports her last hemodialysis was yesterday with no complications.  She denies chest pain or shortness of breath.    The history is provided by the patient.     Review of patient's allergies indicates:   Allergen Reactions    Penicillins Hives     Past Medical History:   Diagnosis Date    ESRD on hemodialysis 2022    Gastritis 2022    EGD was 22    Gastroparesis 2022    has not had Emptying study    Heart failure with preserved ejection fraction 2022    EF 55% on 3/22    History of supraventricular tachycardia     Hyperkalemia 2022    Hypertensive emergency 2022    Sickle cell trait 2022    Type 2 diabetes mellitus      Past Surgical History:   Procedure Laterality Date     SECTION      x 3    COLONOSCOPY      COLONOSCOPY N/A 2022    Procedure: COLONOSCOPY;  Surgeon: Jagdeep Cedeno MD;  Location: [facility];  Service: Endoscopy;  Laterality: N/A;    ENDOSCOPIC ULTRASOUND OF UPPER GASTROINTESTINAL TRACT N/A 2023    Procedure: ULTRASOUND, UPPER GI TRACT, ENDOSCOPIC;  Surgeon: Micky Paredes III, MD;  Location: [facility];  Service: Endoscopy;  Laterality: N/A;    ESOPHAGOGASTRODUODENOSCOPY N/A 10/18/2019    Procedure: ESOPHAGOGASTRODUODENOSCOPY (EGD);  Surgeon: Gianluca Mendez MD;  Location: [facility];  Service: Endoscopy;  Laterality: N/A;     ESOPHAGOGASTRODUODENOSCOPY N/A 08/24/2022    Procedure: EGD (ESOPHAGOGASTRODUODENOSCOPY);  Surgeon: Micky Paredes III, MD;  Location: [facility] ENDO;  Service: Endoscopy;  Laterality: N/A;    ESOPHAGOGASTRODUODENOSCOPY N/A 12/5/2022    Procedure: EGD (ESOPHAGOGASTRODUODENOSCOPY);  Surgeon: Marcelo Zhong MD;  Location: [facility] ENDO;  Service: Endoscopy;  Laterality: N/A;    INSERTION, CATHETER, TUNNELED N/A 6/17/2023    Procedure: Insertion,catheter,tunneled;  Surgeon: Carlos Thurman Jr., MD;  Location: [facility] OR;  Service: General;  Laterality: N/A;    LAPAROSCOPIC CHOLECYSTECTOMY N/A 07/30/2022    Procedure: CHOLECYSTECTOMY, LAPAROSCOPIC;  Surgeon: Grey Perez MD;  Location: [facility] OR;  Service: General;  Laterality: N/A;    PLACEMENT OF DUAL-LUMEN VASCULAR CATHETER Left 07/12/2022    Procedure: INSERTION-CATHETER-JOSEPH;  Surgeon: Dionte Gan MD;  Location: [facility] OR;  Service: General;  Laterality: Left;    PLACEMENT OF DUAL-LUMEN VASCULAR CATHETER Right 07/26/2022    Procedure: INSERTION-CATHETER-Hemosplit;  Surgeon: Dionte Gan MD;  Location: [facility] OR;  Service: General;  Laterality: Right;     Family History   Problem Relation Age of Onset    Diabetes Mother     Diabetes Father      Social History     Tobacco Use    Smoking status: Never    Smokeless tobacco: Never   Substance Use Topics    Alcohol use: Not Currently    Drug use: No     Review of Systems   Constitutional:  Negative for activity change, appetite change, chills and fever.   HENT:  Negative for congestion, rhinorrhea and sore throat.    Eyes:  Negative for redness and visual disturbance.   Respiratory:  Negative for cough, chest tightness and shortness of breath.    Cardiovascular:  Negative for chest pain.   Gastrointestinal:  Positive for abdominal pain, nausea and vomiting. Negative for diarrhea.   Musculoskeletal:  Negative for back pain, neck pain and neck stiffness.   Skin:  Negative for rash.   Neurological:  Negative for  dizziness, syncope, numbness and headaches.       Physical Exam     Vitals:    01/21/24 1204 01/21/24 1211 01/21/24 1330 01/21/24 1430   BP:  (!) 194/110 (!) 194/92 (!) 189/81   Pulse:  86 86 87   Resp: 14 20 16 14   Temp:       TempSrc:       SpO2:  97% (!) 91% 97%   Weight:       Height:           Physical Exam    Nursing note and vitals reviewed.  Constitutional: Vital signs are normal. She appears well-developed and well-nourished. She is cooperative.  Non-toxic appearance. She does not have a sickly appearance.   HENT:   Head: Normocephalic and atraumatic.   Right Ear: External ear normal.   Left Ear: External ear normal.   Nose: Nose normal.   Mouth/Throat: Uvula is midline.   Eyes: Conjunctivae and lids are normal.   Neck: Neck supple.   Normal range of motion.   Full passive range of motion without pain.     Cardiovascular:  Normal rate, regular rhythm and normal heart sounds.     Exam reveals no gallop and no friction rub.       No murmur heard.  Pulmonary/Chest: Breath sounds normal. She has no wheezes. She has no rhonchi. She has no rales.   Abdominal: Abdomen is soft. There is abdominal tenderness.   Mild tenderness.  There is no rebound and no guarding.   Musculoskeletal:      Cervical back: Full passive range of motion without pain, normal range of motion and neck supple.     Neurological: She is alert and oriented to person, place, and time.   Skin: Skin is warm, dry and intact. No rash noted.         ED Course   Procedures  Labs Reviewed   CBC W/ AUTO DIFFERENTIAL - Abnormal; Notable for the following components:       Result Value    RBC 3.05 (*)     Hemoglobin 8.7 (*)     Hematocrit 25.8 (*)     RDW 15.8 (*)     Platelets 130 (*)     Lymph # 0.8 (*)     Eos # 0.6 (*)     Lymph % 12.3 (*)     Eosinophil % 10.2 (*)     Platelet Estimate Decreased (*)     All other components within normal limits   COMPREHENSIVE METABOLIC PANEL - Abnormal; Notable for the following components:    Glucose 382 (*)      Creatinine 5.0 (*)     Total Protein 8.5 (*)     Total Bilirubin 2.3 (*)     Alkaline Phosphatase 150 (*)     eGFR 11 (*)     Anion Gap 21 (*)     All other components within normal limits   POCT GLUCOSE - Abnormal; Notable for the following components:    POCT Glucose 429 (*)     All other components within normal limits   LIPASE   URINALYSIS, REFLEX TO URINE CULTURE   POCT URINE PREGNANCY   POCT GLUCOSE MONITORING CONTINUOUS          Imaging Results    None          Medications   droPERidol injection 2.5 mg (2.5 mg Intravenous Given 1/21/24 1050)   morphine injection 6 mg (6 mg Intravenous Given 1/21/24 1204)   ondansetron injection 4 mg (4 mg Intravenous Given 1/21/24 1248)     Medical Decision Making  Emergent evaluation of a 34-year-old female who presents with chronic abdominal pain with associated nausea and vomiting.  Known gastroparesis.  She was dialyzed yesterday without complication.  She denies chest pain or shortness of breath.  She does not make urine.  She is initially hypertensive.  She has mild, generalized tenderness with no rebound or guarding.  Labs reviewed.  No leukocytosis.  She denies fevers.  I doubt systemic illness.  She has chronically elevated total bili and LFTs, unchanged from prior.  She was given pain and nausea medication with significant improvement.  Able to tolerate p.o. intake.  Recommend close follow up outpatient with primary care and GI.    Amount and/or Complexity of Data Reviewed  External Data Reviewed: labs, radiology and notes.  Labs: ordered.    Risk  Prescription drug management.                                      Clinical Impression:  Final diagnoses:  [R10.9, G89.29] Chronic abdominal pain (Primary)          ED Disposition Condition    Discharge Stable          ED Prescriptions    None       Follow-up Information       Follow up With Specialties Details Why Contact Info Additional Information    Melony Kim NP Nurse Practitioner   [address] [place] [address]  [place]  [place] [address]  [phone]       Elias Morris MD Gastroenterology   [address] [place] [address]  [phone]       [place] [facility] Emergency Medicine  As needed [address] [place] [address] [address] [place] [address] 1st floor             Nancie Agustin PA-C  01/21/24 0843

## 2024-01-25 ENCOUNTER — HOSPITAL ENCOUNTER (OUTPATIENT)
Facility: HOSPITAL | Age: 34
Discharge: LEFT BEFORE DISCHARGE | End: 2024-01-25
Attending: STUDENT IN AN ORGANIZED HEALTH CARE EDUCATION/TRAINING PROGRAM | Admitting: HOSPITALIST
Payer: MEDICAID

## 2024-01-25 VITALS
DIASTOLIC BLOOD PRESSURE: 90 MMHG | TEMPERATURE: 97 F | SYSTOLIC BLOOD PRESSURE: 189 MMHG | HEART RATE: 84 BPM | OXYGEN SATURATION: 100 % | RESPIRATION RATE: 18 BRPM

## 2024-01-25 DIAGNOSIS — N18.6 ESRD (END STAGE RENAL DISEASE): ICD-10-CM

## 2024-01-25 DIAGNOSIS — D64.9 SYMPTOMATIC ANEMIA: Primary | ICD-10-CM

## 2024-01-25 DIAGNOSIS — E11.3553 STABLE PROLIFERATIVE DIABETIC RETINOPATHY OF BOTH EYES ASSOCIATED WITH TYPE 2 DIABETES MELLITUS: ICD-10-CM

## 2024-01-25 DIAGNOSIS — R07.9 CHEST PAIN: ICD-10-CM

## 2024-01-25 PROBLEM — N30.90 CYSTITIS: Status: ACTIVE | Noted: 2024-01-25

## 2024-01-25 LAB
ABO + RH BLD: NORMAL
ALBUMIN SERPL BCP-MCNC: 3.3 G/DL (ref 3.5–5.2)
ALP SERPL-CCNC: 113 U/L (ref 55–135)
ALT SERPL W/O P-5'-P-CCNC: 10 U/L (ref 10–44)
ANION GAP SERPL CALC-SCNC: 13 MMOL/L (ref 8–16)
AST SERPL-CCNC: 11 U/L (ref 10–40)
BASOPHILS # BLD AUTO: 0.03 K/UL (ref 0–0.2)
BASOPHILS NFR BLD: 0.7 % (ref 0–1.9)
BILIRUB SERPL-MCNC: 1.2 MG/DL (ref 0.1–1)
BLD GP AB SCN CELLS X3 SERPL QL: NORMAL
BLD PROD TYP BPU: NORMAL
BLOOD UNIT EXPIRATION DATE: NORMAL
BLOOD UNIT TYPE CODE: 6200
BLOOD UNIT TYPE: NORMAL
BNP SERPL-MCNC: 1778 PG/ML (ref 0–99)
BUN SERPL-MCNC: 30 MG/DL (ref 6–20)
CALCIUM SERPL-MCNC: 8.8 MG/DL (ref 8.7–10.5)
CHLORIDE SERPL-SCNC: 94 MMOL/L (ref 95–110)
CO2 SERPL-SCNC: 24 MMOL/L (ref 23–29)
CODING SYSTEM: NORMAL
CREAT SERPL-MCNC: 7.2 MG/DL (ref 0.5–1.4)
CROSSMATCH INTERPRETATION: NORMAL
DIFFERENTIAL METHOD BLD: ABNORMAL
DISPENSE STATUS: NORMAL
EOSINOPHIL # BLD AUTO: 0.3 K/UL (ref 0–0.5)
EOSINOPHIL NFR BLD: 7.1 % (ref 0–8)
ERYTHROCYTE [DISTWIDTH] IN BLOOD BY AUTOMATED COUNT: 18.2 % (ref 11.5–14.5)
EST. GFR  (NO RACE VARIABLE): 7 ML/MIN/1.73 M^2
GLUCOSE SERPL-MCNC: 576 MG/DL (ref 70–110)
HCT VFR BLD AUTO: 20.4 % (ref 37–48.5)
HGB BLD-MCNC: 6.9 G/DL (ref 12–16)
IMM GRANULOCYTES # BLD AUTO: 0.03 K/UL (ref 0–0.04)
IMM GRANULOCYTES NFR BLD AUTO: 0.7 % (ref 0–0.5)
LIPASE SERPL-CCNC: 39 U/L (ref 4–60)
LYMPHOCYTES # BLD AUTO: 0.8 K/UL (ref 1–4.8)
LYMPHOCYTES NFR BLD: 17 % (ref 18–48)
MAGNESIUM SERPL-MCNC: 2 MG/DL (ref 1.6–2.6)
MCH RBC QN AUTO: 29.2 PG (ref 27–31)
MCHC RBC AUTO-ENTMCNC: 33.8 G/DL (ref 32–36)
MCV RBC AUTO: 86 FL (ref 82–98)
MONOCYTES # BLD AUTO: 0.4 K/UL (ref 0.3–1)
MONOCYTES NFR BLD: 7.7 % (ref 4–15)
NEUTROPHILS # BLD AUTO: 3 K/UL (ref 1.8–7.7)
NEUTROPHILS NFR BLD: 66.8 % (ref 38–73)
NRBC BLD-RTO: 0 /100 WBC
NUM UNITS TRANS PACKED RBC: NORMAL
PHOSPHATE SERPL-MCNC: 4 MG/DL (ref 2.7–4.5)
PLATELET # BLD AUTO: 108 K/UL (ref 150–450)
PMV BLD AUTO: 9.5 FL (ref 9.2–12.9)
POTASSIUM SERPL-SCNC: 3.8 MMOL/L (ref 3.5–5.1)
PROT SERPL-MCNC: 6.7 G/DL (ref 6–8.4)
RBC # BLD AUTO: 2.36 M/UL (ref 4–5.4)
SODIUM SERPL-SCNC: 131 MMOL/L (ref 136–145)
SPECIMEN OUTDATE: NORMAL
TROPONIN I SERPL DL<=0.01 NG/ML-MCNC: 0.04 NG/ML (ref 0–0.03)
WBC # BLD AUTO: 4.53 K/UL (ref 3.9–12.7)

## 2024-01-25 PROCEDURE — 96375 TX/PRO/DX INJ NEW DRUG ADDON: CPT | Mod: 59

## 2024-01-25 PROCEDURE — 99285 EMERGENCY DEPT VISIT HI MDM: CPT | Mod: 25

## 2024-01-25 PROCEDURE — P9016 RBC LEUKOCYTES REDUCED: HCPCS | Performed by: STUDENT IN AN ORGANIZED HEALTH CARE EDUCATION/TRAINING PROGRAM

## 2024-01-25 PROCEDURE — 80053 COMPREHEN METABOLIC PANEL: CPT | Performed by: STUDENT IN AN ORGANIZED HEALTH CARE EDUCATION/TRAINING PROGRAM

## 2024-01-25 PROCEDURE — 36415 COLL VENOUS BLD VENIPUNCTURE: CPT | Performed by: STUDENT IN AN ORGANIZED HEALTH CARE EDUCATION/TRAINING PROGRAM

## 2024-01-25 PROCEDURE — 93005 ELECTROCARDIOGRAM TRACING: CPT

## 2024-01-25 PROCEDURE — 83735 ASSAY OF MAGNESIUM: CPT | Performed by: STUDENT IN AN ORGANIZED HEALTH CARE EDUCATION/TRAINING PROGRAM

## 2024-01-25 PROCEDURE — 86850 RBC ANTIBODY SCREEN: CPT | Performed by: STUDENT IN AN ORGANIZED HEALTH CARE EDUCATION/TRAINING PROGRAM

## 2024-01-25 PROCEDURE — 36430 TRANSFUSION BLD/BLD COMPNT: CPT

## 2024-01-25 PROCEDURE — 80100014 HC HEMODIALYSIS 1:1

## 2024-01-25 PROCEDURE — 93010 ELECTROCARDIOGRAM REPORT: CPT | Mod: ,,, | Performed by: GENERAL PRACTICE

## 2024-01-25 PROCEDURE — 85025 COMPLETE CBC W/AUTO DIFF WBC: CPT | Performed by: STUDENT IN AN ORGANIZED HEALTH CARE EDUCATION/TRAINING PROGRAM

## 2024-01-25 PROCEDURE — 25000003 PHARM REV CODE 250: Performed by: STUDENT IN AN ORGANIZED HEALTH CARE EDUCATION/TRAINING PROGRAM

## 2024-01-25 PROCEDURE — 84100 ASSAY OF PHOSPHORUS: CPT | Performed by: STUDENT IN AN ORGANIZED HEALTH CARE EDUCATION/TRAINING PROGRAM

## 2024-01-25 PROCEDURE — 83690 ASSAY OF LIPASE: CPT | Performed by: STUDENT IN AN ORGANIZED HEALTH CARE EDUCATION/TRAINING PROGRAM

## 2024-01-25 PROCEDURE — 84484 ASSAY OF TROPONIN QUANT: CPT | Performed by: STUDENT IN AN ORGANIZED HEALTH CARE EDUCATION/TRAINING PROGRAM

## 2024-01-25 PROCEDURE — 63600175 PHARM REV CODE 636 W HCPCS: Performed by: HOSPITALIST

## 2024-01-25 PROCEDURE — G0378 HOSPITAL OBSERVATION PER HR: HCPCS

## 2024-01-25 PROCEDURE — 96375 TX/PRO/DX INJ NEW DRUG ADDON: CPT

## 2024-01-25 PROCEDURE — G0257 UNSCHED DIALYSIS ESRD PT HOS: HCPCS

## 2024-01-25 PROCEDURE — 63600175 PHARM REV CODE 636 W HCPCS: Performed by: STUDENT IN AN ORGANIZED HEALTH CARE EDUCATION/TRAINING PROGRAM

## 2024-01-25 PROCEDURE — 86920 COMPATIBILITY TEST SPIN: CPT | Performed by: STUDENT IN AN ORGANIZED HEALTH CARE EDUCATION/TRAINING PROGRAM

## 2024-01-25 PROCEDURE — 94760 N-INVAS EAR/PLS OXIMETRY 1: CPT

## 2024-01-25 PROCEDURE — 99900035 HC TECH TIME PER 15 MIN (STAT)

## 2024-01-25 PROCEDURE — 96374 THER/PROPH/DIAG INJ IV PUSH: CPT

## 2024-01-25 PROCEDURE — 83880 ASSAY OF NATRIURETIC PEPTIDE: CPT | Performed by: STUDENT IN AN ORGANIZED HEALTH CARE EDUCATION/TRAINING PROGRAM

## 2024-01-25 RX ORDER — ACETAMINOPHEN 325 MG/1
650 TABLET ORAL EVERY 4 HOURS PRN
Status: DISCONTINUED | OUTPATIENT
Start: 2024-01-25 | End: 2024-01-25 | Stop reason: HOSPADM

## 2024-01-25 RX ORDER — LEVETIRACETAM 500 MG/1
500 TABLET ORAL 2 TIMES DAILY
Status: DISCONTINUED | OUTPATIENT
Start: 2024-01-25 | End: 2024-01-25 | Stop reason: HOSPADM

## 2024-01-25 RX ORDER — HYDROCODONE BITARTRATE AND ACETAMINOPHEN 500; 5 MG/1; MG/1
TABLET ORAL
Status: DISCONTINUED | OUTPATIENT
Start: 2024-01-25 | End: 2024-01-25 | Stop reason: HOSPADM

## 2024-01-25 RX ORDER — PROCHLORPERAZINE EDISYLATE 5 MG/ML
5 INJECTION INTRAMUSCULAR; INTRAVENOUS EVERY 6 HOURS PRN
Status: DISCONTINUED | OUTPATIENT
Start: 2024-01-25 | End: 2024-01-25 | Stop reason: HOSPADM

## 2024-01-25 RX ORDER — FAMOTIDINE 10 MG/ML
20 INJECTION INTRAVENOUS
Status: COMPLETED | OUTPATIENT
Start: 2024-01-25 | End: 2024-01-25

## 2024-01-25 RX ORDER — HYDRALAZINE HYDROCHLORIDE 20 MG/ML
5 INJECTION INTRAMUSCULAR; INTRAVENOUS
Status: COMPLETED | OUTPATIENT
Start: 2024-01-25 | End: 2024-01-25

## 2024-01-25 RX ORDER — IPRATROPIUM BROMIDE AND ALBUTEROL SULFATE 2.5; .5 MG/3ML; MG/3ML
3 SOLUTION RESPIRATORY (INHALATION) EVERY 6 HOURS PRN
Status: DISCONTINUED | OUTPATIENT
Start: 2024-01-25 | End: 2024-01-25 | Stop reason: HOSPADM

## 2024-01-25 RX ORDER — SEVELAMER CARBONATE 800 MG/1
1600 TABLET, FILM COATED ORAL
Status: DISCONTINUED | OUTPATIENT
Start: 2024-01-26 | End: 2024-01-25 | Stop reason: HOSPADM

## 2024-01-25 RX ORDER — ALUMINUM HYDROXIDE, MAGNESIUM HYDROXIDE, AND SIMETHICONE 1200; 120; 1200 MG/30ML; MG/30ML; MG/30ML
30 SUSPENSION ORAL 4 TIMES DAILY PRN
Status: DISCONTINUED | OUTPATIENT
Start: 2024-01-25 | End: 2024-01-25 | Stop reason: HOSPADM

## 2024-01-25 RX ORDER — GLUCAGON 1 MG
1 KIT INJECTION
Status: DISCONTINUED | OUTPATIENT
Start: 2024-01-25 | End: 2024-01-25 | Stop reason: HOSPADM

## 2024-01-25 RX ORDER — INSULIN ASPART 100 [IU]/ML
1-10 INJECTION, SOLUTION INTRAVENOUS; SUBCUTANEOUS
Status: DISCONTINUED | OUTPATIENT
Start: 2024-01-25 | End: 2024-01-25 | Stop reason: HOSPADM

## 2024-01-25 RX ORDER — PANTOPRAZOLE SODIUM 40 MG/1
40 TABLET, DELAYED RELEASE ORAL DAILY
Status: DISCONTINUED | OUTPATIENT
Start: 2024-01-26 | End: 2024-01-25 | Stop reason: HOSPADM

## 2024-01-25 RX ORDER — TALC
6 POWDER (GRAM) TOPICAL NIGHTLY PRN
Status: DISCONTINUED | OUTPATIENT
Start: 2024-01-25 | End: 2024-01-25 | Stop reason: HOSPADM

## 2024-01-25 RX ORDER — IBUPROFEN 200 MG
16 TABLET ORAL
Status: DISCONTINUED | OUTPATIENT
Start: 2024-01-25 | End: 2024-01-25 | Stop reason: HOSPADM

## 2024-01-25 RX ORDER — AMLODIPINE BESYLATE 5 MG/1
5 TABLET ORAL
Status: DISCONTINUED | OUTPATIENT
Start: 2024-01-25 | End: 2024-01-25 | Stop reason: HOSPADM

## 2024-01-25 RX ORDER — AMLODIPINE BESYLATE 5 MG/1
5 TABLET ORAL DAILY
Status: DISCONTINUED | OUTPATIENT
Start: 2024-01-26 | End: 2024-01-25 | Stop reason: HOSPADM

## 2024-01-25 RX ORDER — KETOROLAC TROMETHAMINE 30 MG/ML
10 INJECTION, SOLUTION INTRAMUSCULAR; INTRAVENOUS
Status: COMPLETED | OUTPATIENT
Start: 2024-01-25 | End: 2024-01-25

## 2024-01-25 RX ORDER — NALOXONE HCL 0.4 MG/ML
0.02 VIAL (ML) INJECTION
Status: DISCONTINUED | OUTPATIENT
Start: 2024-01-25 | End: 2024-01-25 | Stop reason: HOSPADM

## 2024-01-25 RX ORDER — POLYETHYLENE GLYCOL 3350 17 G/17G
17 POWDER, FOR SOLUTION ORAL DAILY
Status: DISCONTINUED | OUTPATIENT
Start: 2024-01-26 | End: 2024-01-25 | Stop reason: HOSPADM

## 2024-01-25 RX ORDER — HYDROCODONE BITARTRATE AND ACETAMINOPHEN 5; 325 MG/1; MG/1
1 TABLET ORAL EVERY 6 HOURS PRN
Status: DISCONTINUED | OUTPATIENT
Start: 2024-01-25 | End: 2024-01-25 | Stop reason: HOSPADM

## 2024-01-25 RX ORDER — ONDANSETRON 4 MG/1
8 TABLET, ORALLY DISINTEGRATING ORAL EVERY 8 HOURS PRN
Status: DISCONTINUED | OUTPATIENT
Start: 2024-01-25 | End: 2024-01-25 | Stop reason: HOSPADM

## 2024-01-25 RX ORDER — NICARDIPINE HYDROCHLORIDE 0.2 MG/ML
0-15 INJECTION INTRAVENOUS CONTINUOUS
Status: DISCONTINUED | OUTPATIENT
Start: 2024-01-25 | End: 2024-01-25 | Stop reason: HOSPADM

## 2024-01-25 RX ORDER — HYDRALAZINE HYDROCHLORIDE 25 MG/1
100 TABLET, FILM COATED ORAL 2 TIMES DAILY
Status: DISCONTINUED | OUTPATIENT
Start: 2024-01-25 | End: 2024-01-25 | Stop reason: HOSPADM

## 2024-01-25 RX ORDER — SUCRALFATE 1 G/10ML
1 SUSPENSION ORAL
Status: DISCONTINUED | OUTPATIENT
Start: 2024-01-25 | End: 2024-01-25 | Stop reason: HOSPADM

## 2024-01-25 RX ORDER — IBUPROFEN 200 MG
24 TABLET ORAL
Status: DISCONTINUED | OUTPATIENT
Start: 2024-01-25 | End: 2024-01-25 | Stop reason: HOSPADM

## 2024-01-25 RX ORDER — ISOSORBIDE MONONITRATE 30 MG/1
30 TABLET, EXTENDED RELEASE ORAL DAILY
Status: DISCONTINUED | OUTPATIENT
Start: 2024-01-26 | End: 2024-01-25 | Stop reason: HOSPADM

## 2024-01-25 RX ORDER — SIMETHICONE 80 MG
1 TABLET,CHEWABLE ORAL 4 TIMES DAILY PRN
Status: DISCONTINUED | OUTPATIENT
Start: 2024-01-25 | End: 2024-01-25 | Stop reason: HOSPADM

## 2024-01-25 RX ORDER — PROCHLORPERAZINE EDISYLATE 5 MG/ML
10 INJECTION INTRAMUSCULAR; INTRAVENOUS
Status: COMPLETED | OUTPATIENT
Start: 2024-01-25 | End: 2024-01-25

## 2024-01-25 RX ORDER — SODIUM CHLORIDE 0.9 % (FLUSH) 0.9 %
10 SYRINGE (ML) INJECTION EVERY 8 HOURS PRN
Status: DISCONTINUED | OUTPATIENT
Start: 2024-01-25 | End: 2024-01-25 | Stop reason: HOSPADM

## 2024-01-25 RX ORDER — INSULIN ASPART 100 [IU]/ML
8 INJECTION, SOLUTION INTRAVENOUS; SUBCUTANEOUS
Status: DISCONTINUED | OUTPATIENT
Start: 2024-01-26 | End: 2024-01-25 | Stop reason: HOSPADM

## 2024-01-25 RX ORDER — HYDRALAZINE HYDROCHLORIDE 25 MG/1
100 TABLET, FILM COATED ORAL
Status: DISCONTINUED | OUTPATIENT
Start: 2024-01-25 | End: 2024-01-25 | Stop reason: SDUPTHER

## 2024-01-25 RX ADMIN — FAMOTIDINE 20 MG: 10 INJECTION, SOLUTION INTRAVENOUS at 01:01

## 2024-01-25 RX ADMIN — KETOROLAC TROMETHAMINE 10 MG: 30 INJECTION, SOLUTION INTRAMUSCULAR; INTRAVENOUS at 01:01

## 2024-01-25 RX ADMIN — PROCHLORPERAZINE EDISYLATE 10 MG: 5 INJECTION INTRAMUSCULAR; INTRAVENOUS at 01:01

## 2024-01-25 RX ADMIN — NICARDIPINE HYDROCHLORIDE 1 MG/HR: 0.2 INJECTION, SOLUTION INTRAVENOUS at 03:01

## 2024-01-25 RX ADMIN — HYDRALAZINE HYDROCHLORIDE 5 MG: 20 INJECTION INTRAMUSCULAR; INTRAVENOUS at 01:01

## 2024-01-25 NOTE — ASSESSMENT & PLAN NOTE
Chronic, uncontrolled. Latest blood pressure and vitals reviewed-     Temp:  [98.7 °F (37.1 °C)]   Pulse:  [80-82]   Resp:  [17]   BP: (199-255)/(100-133)   SpO2:  [83 %-100 %] .   Home meds for hypertension were reviewed and noted below.   Hypertension Medications               amLODIPine (NORVASC) 5 MG tablet Take 1 tablet (5 mg total) by mouth once daily.    carvediloL (COREG) 25 MG tablet Take 1 tablet (25 mg total) by mouth 2 (two) times daily.    hydrALAZINE (APRESOLINE) 100 MG tablet Take 1 tablet (100 mg total) by mouth 2 (two) times daily.    isosorbide mononitrate (IMDUR) 30 MG 24 hr tablet Take 1 tablet (30 mg total) by mouth once daily.            While in the hospital, will manage blood pressure as follows; Continue home antihypertensive regimen    Cardene drip for now with titration to SBP less than 180

## 2024-01-25 NOTE — ED NOTES
Pt's sats are running 83 on RA and placed on  2L/NC. She is very sleepy and unable to get her to take PO meds for B/P. Was given IV medication to treat B/P and Hospitalist aware.

## 2024-01-25 NOTE — ASSESSMENT & PLAN NOTE
"Patient's FSGs are uncontrolled due to hyperglycemia on current medication regimen.  Last A1c reviewed-   Lab Results   Component Value Date    HGBA1C 5.8 08/01/2023     Most recent fingerstick glucose reviewed- No results for input(s): "POCTGLUCOSE" in the last 24 hours.  Current correctional scale  Medium  Increase anti-hyperglycemic dose as follows-   Antihyperglycemics (From admission, onward)      None          Hold Oral hypoglycemics while patient is in the hospital.      "

## 2024-01-25 NOTE — ASSESSMENT & PLAN NOTE
Patient's anemia is currently uncontrolled. Has not received any PRBCs to date. Etiology likely d/t chronic disease due to Chronic Kidney Disease/ESRD  Current CBC reviewed-   Lab Results   Component Value Date    HGB 6.9 (LL) 01/25/2024    HCT 20.4 (LL) 01/25/2024     Monitor serial CBC and transfuse if patient becomes hemodynamically unstable, symptomatic or H/H drops below 7/21.    Transfuse 1 unit PRBCs with dialysis today  Hold Eliquis today

## 2024-01-25 NOTE — HPI
Patient is a 34-year-old female with a past medical history of ESRD on HD, gastroparesis, SVT, hyperkalemia, hypertensive emergency, type 2 diabetes who presents to the emergency room today for evaluation of abdominal pain and nausea and vomiting.  She denies chest pain to me.  Today is her regular dialysis day and she reports to me that she went to dialysis but then was sent here for low blood count needing a transfusion.  She reports that she has been feeling poorly since this morning.  She reports that she has been checking her blood sugars and they have been high.  She denies taking any of her medications this morning including blood pressure medications.  She reports her abdominal pain is bilateral lower quadrants and she has back pain as well.  In the emergency department her blood pressure is grossly elevated.  Her glucose is 585.  Anion gap and CO2 were normal.  CT abdomen was obtained with concern for cystitis.  Patient does not make urine.  Hemoglobin is 6.9.  Nephrology has been consulted and she will get a unit of blood and dialysis.  I discussed with Dr. Clark.  Will also start patient on Cardene drip

## 2024-01-25 NOTE — H&P
[facility] Medicine  History & Physical    Patient Name: Tabby Howard  MRN: [id number]  Patient Class: OP- Observation  Admission Date: 1/25/2024  Attending Physician: Linda Cueto MD  Primary Care Provider: Melony Kim NP         Patient information was obtained from patient, past medical records, and ER records.     Subjective:     Principal Problem:Symptomatic anemia    Chief Complaint:   Chief Complaint   Patient presents with    Low H&H     Pt brought to ED via EMS with complaint of low H&H, chest discomfort, abd pain and back pain.           HPI: Patient is a 34-year-old female with a past medical history of ESRD on HD, gastroparesis, SVT, hyperkalemia, hypertensive emergency, type 2 diabetes who presents to the emergency room today for evaluation of abdominal pain and nausea and vomiting.  She denies chest pain to me.  Today is her regular dialysis day and she reports to me that she went to dialysis but then was sent here for low blood count needing a transfusion.  She reports that she has been feeling poorly since this morning.  She reports that she has been checking her blood sugars and they have been high.  She denies taking any of her medications this morning including blood pressure medications.  She reports her abdominal pain is bilateral lower quadrants and she has back pain as well.  In the emergency department her blood pressure is grossly elevated.  Her glucose is 585.  Anion gap and CO2 were normal.  CT abdomen was obtained with concern for cystitis.  Patient does not make urine.  Hemoglobin is 6.9.  Nephrology has been consulted and she will get a unit of blood and dialysis.  I discussed with Dr. Clark.  Will also start patient on Cardene drip      Past Medical History:   Diagnosis Date    ESRD on hemodialysis 04/12/2022    Gastritis 12/2022    EGD was 12/5/22    Gastroparesis 04/12/2022    has not had Emptying study    Heart failure with preserved ejection  fraction 2022    EF 55% on 3/22    History of supraventricular tachycardia     Hyperkalemia 2022    Hypertensive emergency 2022    Sickle cell trait 2022    Type 2 diabetes mellitus        Past Surgical History:   Procedure Laterality Date     SECTION      x 3    COLONOSCOPY      COLONOSCOPY N/A 2022    Procedure: COLONOSCOPY;  Surgeon: Jagdeep Cedeno MD;  Location: [facility];  Service: Endoscopy;  Laterality: N/A;    ENDOSCOPIC ULTRASOUND OF UPPER GASTROINTESTINAL TRACT N/A 2023    Procedure: ULTRASOUND, UPPER GI TRACT, ENDOSCOPIC;  Surgeon: Micky Paredes III, MD;  Location: [facility];  Service: Endoscopy;  Laterality: N/A;    ESOPHAGOGASTRODUODENOSCOPY N/A 10/18/2019    Procedure: ESOPHAGOGASTRODUODENOSCOPY (EGD);  Surgeon: Gianluca Mendez MD;  Location: [facility];  Service: Endoscopy;  Laterality: N/A;    ESOPHAGOGASTRODUODENOSCOPY N/A 2022    Procedure: EGD (ESOPHAGOGASTRODUODENOSCOPY);  Surgeon: Micky Paredes III, MD;  Location: [facility];  Service: Endoscopy;  Laterality: N/A;    ESOPHAGOGASTRODUODENOSCOPY N/A 2022    Procedure: EGD (ESOPHAGOGASTRODUODENOSCOPY);  Surgeon: Marcelo Zhong MD;  Location: [facility];  Service: Endoscopy;  Laterality: N/A;    INSERTION, CATHETER, TUNNELED N/A 2023    Procedure: Insertion,catheter,tunneled;  Surgeon: Carlos Thurman Jr., MD;  Location: [facility] OR;  Service: General;  Laterality: N/A;    LAPAROSCOPIC CHOLECYSTECTOMY N/A 2022    Procedure: CHOLECYSTECTOMY, LAPAROSCOPIC;  Surgeon: Grey Perez MD;  Location: [facility] OR;  Service: General;  Laterality: N/A;    PLACEMENT OF DUAL-LUMEN VASCULAR CATHETER Left 2022    Procedure: INSERTION-CATHETER-JOSEPH;  Surgeon: Dionte Gan MD;  Location: [facility] OR;  Service: General;  Laterality: Left;    PLACEMENT OF DUAL-LUMEN VASCULAR CATHETER Right 2022    Procedure: INSERTION-CATHETER-Hemosplit;  Surgeon: Dionte Gan MD;   Location: [facility] OR;  Service: General;  Laterality: Right;       Review of patient's allergies indicates:   Allergen Reactions    Penicillins Hives       No current facility-administered medications on file prior to encounter.     Current Outpatient Medications on File Prior to Encounter   Medication Sig    amLODIPine (NORVASC) 5 MG tablet Take 1 tablet (5 mg total) by mouth once daily. (Patient taking differently: Take 5 mg by mouth once daily.)    apixaban (ELIQUIS) 5 mg Tab Take 1 tablet (5 mg total) by mouth 2 (two) times daily.    carvediloL (COREG) 25 MG tablet Take 1 tablet (25 mg total) by mouth 2 (two) times daily.    cetirizine (ZYRTEC) 10 MG tablet Take 1 tablet (10 mg total) by mouth once daily.    FLUoxetine 20 MG capsule Take 1 capsule (20 mg total) by mouth once daily.    fluticasone propionate (FLONASE ALLERGY RELIEF) 50 mcg/actuation nasal spray Hewitt 1 spray every day by intranasal route. (Patient taking differently: 1 spray by Each Nostril route Daily.)    gabapentin (NEURONTIN) 300 MG capsule Take 2 capsules twice a day by oral route for 90 days. (Patient taking differently: Take 600 mg by mouth 2 (two) times daily.)    hydrALAZINE (APRESOLINE) 100 MG tablet Take 1 tablet (100 mg total) by mouth 2 (two) times daily.    insulin aspart U-100 (NOVOLOG FLEXPEN U-100 INSULIN) 100 unit/mL (3 mL) InPn pen Inject 8 units 3 times a day by subcutaneous route before meals (Patient taking differently: Inject 8 Units into the skin 3 (three) times daily before meals.)    insulin detemir U-100, Levemir, (LEVEMIR FLEXTOUCH U100 INSULIN) 100 unit/mL (3 mL) InPn pen Inject 21 units every day by subcutaneous route in the evening for 90 days. (Patient taking differently: Inject 21 Units into the skin every evening.)    isosorbide mononitrate (IMDUR) 30 MG 24 hr tablet Take 1 tablet (30 mg total) by mouth once daily.    levETIRAcetam (KEPPRA) 500 MG Tab Take 1 tablet twice a day by oral route for 30 days. (Patient  "taking differently: Take 500 mg by mouth 2 (two) times daily.)    ondansetron (ZOFRAN-ODT) 4 MG TbDL Place 1 tablet (4 mg total) under the tongue every 8 (eight) hours.    oxyCODONE-acetaminophen (PERCOCET) 5-325 mg per tablet Take 1 tablet by mouth every 6 (six) hours as needed for pain. (Patient taking differently: Take 1 tablet by mouth every 6 (six) hours as needed for Pain.)    pantoprazole (PROTONIX) 40 MG tablet Take 1 tablet (40 mg total) by mouth once daily.    promethazine (PHENERGAN) 25 MG tablet Take 1 tablet (25 mg total) by mouth every 6 (six) hours as needed for 5 days. (Patient taking differently: Take 25 mg by mouth every 6 (six) hours as needed for Nausea.)    sevelamer carbonate (RENVELA) 800 mg Tab Take 2 tablets with meals three times a day (Patient taking differently: Take 1,600 mg by mouth 3 (three) times daily with meals.)    sucralfate (CARAFATE) 100 mg/mL suspension Take 10 mL 4 times a day by oral route before meals for 30 days. (Patient taking differently: Take 1 g by mouth 4 (four) times daily with meals and nightly.)    blood sugar diagnostic (TRUE METRIX GLUCOSE TEST STRIP) Strp Use 1 strip to check blood glucose three times daily    blood-glucose meter (TRUE METRIX GLUCOSE METER) Misc Use to check blood glucose    FLUoxetine 20 MG capsule Take 1 capsule every day by oral route for 90 days.    lancets (TRUEPLUS LANCETS) 33 gauge Misc Use 1 to check blood glucose three times daily    lancets 33 gauge Misc Use to test twice daily    oxyCODONE-acetaminophen (PERCOCET) 5-325 mg per tablet TAKE 1 TABLET EVERY 6 HOURS AS NEEDED FOP PAIN (Patient taking differently: Take 1 tablet by mouth every 6 (six) hours as needed for Pain.)    pen needle, diabetic 31 gauge x 3/16" Ndle Use as directed to inject insulin 5 times daily    sucroferric oxyhydroxide (VELPHORO) 500 mg Chew Take 1 tablet 3 times a day (Patient taking differently: Take 1 tablet by mouth 3 (three) times daily.)    [DISCONTINUED] " atenoloL (TENORMIN) 50 MG tablet Take 1 tablet (50 mg total) by mouth every other day.    [DISCONTINUED] omeprazole (PRILOSEC) 20 MG capsule Take 2 capsules (40 mg total) by mouth once daily. for 10 days     Family History       Problem Relation (Age of Onset)    Diabetes Mother, Father          Tobacco Use    Smoking status: Never    Smokeless tobacco: Never   Substance and Sexual Activity    Alcohol use: Not Currently    Drug use: No    Sexual activity: Not Currently     Partners: Male     Birth control/protection: I.U.D.     Review of Systems   Constitutional:  Positive for activity change, appetite change and fatigue. Negative for chills and fever.   HENT:  Negative for congestion and rhinorrhea.    Respiratory:  Negative for cough, shortness of breath and wheezing.    Cardiovascular:  Negative for chest pain, palpitations and leg swelling.   Gastrointestinal:  Positive for abdominal pain (bilateral lower quadrant), nausea and vomiting. Negative for abdominal distention.   Endocrine: Negative for cold intolerance and heat intolerance.   Musculoskeletal:  Negative for arthralgias and neck stiffness.   Skin:  Negative for rash and wound.   Neurological:  Positive for weakness and light-headedness. Negative for dizziness.     Objective:     Vital Signs (Most Recent):  Temp: 98.7 °F (37.1 °C) (01/25/24 1119)  Pulse: 80 (01/25/24 1430)  Resp: 17 (01/25/24 1119)  BP: (!) 215/100 (01/25/24 1430)  SpO2: 100 % (01/25/24 1430) Vital Signs (24h Range):  Temp:  [98.7 °F (37.1 °C)] 98.7 °F (37.1 °C)  Pulse:  [80-82] 80  Resp:  [17] 17  SpO2:  [83 %-100 %] 100 %  BP: (199-255)/(100-133) 215/100        There is no height or weight on file to calculate BMI.     Physical Exam  Constitutional:       General: She is not in acute distress.     Appearance: She is well-developed. She is ill-appearing.      Comments: Keeps eyes closed, responds to questions appropriately when repeatedly asked questions   HENT:      Head: Normocephalic  and atraumatic.   Eyes:      Conjunctiva/sclera: Conjunctivae normal.   Cardiovascular:      Rate and Rhythm: Normal rate and regular rhythm.      Heart sounds: No murmur heard.  Pulmonary:      Effort: Pulmonary effort is normal. No respiratory distress.      Breath sounds: Rales (bibasilar) present. No wheezing.   Abdominal:      General: Bowel sounds are normal. There is no distension.      Palpations: Abdomen is soft.      Tenderness: There is no abdominal tenderness.   Musculoskeletal:         General: Normal range of motion.   Skin:     General: Skin is warm and dry.      Findings: No rash.   Neurological:      Mental Status: She is oriented to person, place, and time.      Cranial Nerves: No cranial nerve deficit.   Psychiatric:         Behavior: Behavior normal.                Significant Labs: All pertinent labs within the past 24 hours have been reviewed.    Significant Imaging: I have reviewed all pertinent imaging results/findings within the past 24 hours.  Assessment/Plan:     * Symptomatic anemia  Patient's anemia is currently uncontrolled. Has not received any PRBCs to date. Etiology likely d/t chronic disease due to Chronic Kidney Disease/ESRD  Current CBC reviewed-   Lab Results   Component Value Date    HGB 6.9 (LL) 01/25/2024    HCT 20.4 (LL) 01/25/2024     Monitor serial CBC and transfuse if patient becomes hemodynamically unstable, symptomatic or H/H drops below 7/21.    Transfuse 1 unit PRBCs with dialysis today  Hold Eliquis today    Cystitis  Patient does not make urine but has lower abdominal pain and back pain as well as CT imaging of bladder wall thickening.  Will treat with IV ceftriaxone empirically      Hypertension  Chronic, uncontrolled. Latest blood pressure and vitals reviewed-     Temp:  [98.7 °F (37.1 °C)]   Pulse:  [80-82]   Resp:  [17]   BP: (199-255)/(100-133)   SpO2:  [83 %-100 %] .   Home meds for hypertension were reviewed and noted below.   Hypertension Medications          "      amLODIPine (NORVASC) 5 MG tablet Take 1 tablet (5 mg total) by mouth once daily.    carvediloL (COREG) 25 MG tablet Take 1 tablet (25 mg total) by mouth 2 (two) times daily.    hydrALAZINE (APRESOLINE) 100 MG tablet Take 1 tablet (100 mg total) by mouth 2 (two) times daily.    isosorbide mononitrate (IMDUR) 30 MG 24 hr tablet Take 1 tablet (30 mg total) by mouth once daily.            While in the hospital, will manage blood pressure as follows; Continue home antihypertensive regimen    Cardene drip for now with titration to SBP less than 180    Anemia in ESRD (end-stage renal disease)  See symptomatic anemia    Chronic congestive heart failure with left ventricular diastolic dysfunction    Recent Labs   Lab 01/25/24  1204   BNP 1,778*         Type 2 diabetes mellitus with hyperglycemia, with long-term current use of insulin, previous HbA1c 5.8%  Patient's FSGs are uncontrolled due to hyperglycemia on current medication regimen.  Last A1c reviewed-   Lab Results   Component Value Date    HGBA1C 5.8 08/01/2023     Most recent fingerstick glucose reviewed- No results for input(s): "POCTGLUCOSE" in the last 24 hours.  Current correctional scale  Medium  Increase anti-hyperglycemic dose as follows-   Antihyperglycemics (From admission, onward)      None          Hold Oral hypoglycemics while patient is in the hospital.        ESRD (end stage renal disease)  Creatine stable for now. BMP reviewed- noted CrCl cannot be calculated (Unknown ideal weight.). according to latest data. Based on current GFR, CKD stage is end stage.  Monitor UOP and serial BMP and adjust therapy as needed. Renally dose meds. Avoid nephrotoxic medications and procedures.    Nephrology consulted. Discussed with Dr. Clark. HD today    Seizure disorder  Continue home antiepileptics  Seizure precautions        VTE Risk Mitigation (From admission, onward)      None               On 01/25/2024, patient should be placed in hospital observation " services under my care.             Linda Cueto MD  Department of Hospital Medicine  [facility]

## 2024-01-25 NOTE — LETTER
Patient: Tabby Howard  YOB: 1990  Date: 1/25/2024 Time: 8:48 PM  Location: [facility]    Leaving the Hospital Against Medical Advice    Chart #:[id number]    This will certify that I, the undersigned,    ______________________________________________________________________    A patient in the above named medical center, having requested discharge and removal from the medical center against the advice of my attending physician(s), hereby release [facility], its physicians, officers and employees, severally and individually, from any and all liability of any nature whatsoever for any injury or harm or complication of any kind that may result directly or indirectly, by reason of my terminating my stay as a patient at [facility] and my departure from [facility], and hereby waive any and all rights of action I may now have or later acquire as a result of my voluntary departure from [facility] and the termination of my stay as a patient therein.    This release is made with the full knowledge of the danger that may result from the action which I am taking.      Date:_______________________                         ___________________________                                                                                    Patient/Legal Representative    Witness:        ____________________________                          ___________________________  Nurse                                                                        Physician

## 2024-01-25 NOTE — ED NOTES
Hospitalist at bedside and assessment of pt performed. B/P still elevated and more orders to follow.

## 2024-01-25 NOTE — ASSESSMENT & PLAN NOTE
Creatine stable for now. BMP reviewed- noted CrCl cannot be calculated (Unknown ideal weight.). according to latest data. Based on current GFR, CKD stage is end stage.  Monitor UOP and serial BMP and adjust therapy as needed. Renally dose meds. Avoid nephrotoxic medications and procedures.    Nephrology consulted. Discussed with Dr. Clark. HD today

## 2024-01-25 NOTE — Clinical Note
Diagnosis: Symptomatic anemia [[id number]]   Future Attending Provider: EVERTON JAFFE [[id number]]   Place in Observation: [facility] [[id number]]

## 2024-01-25 NOTE — ED PROVIDER NOTES
Encounter Date: 2024       History     Chief Complaint   Patient presents with    Low H&H     Pt brought to ED via EMS with complaint of low H&H, chest discomfort, abd pain and back pain.        34 y.o. female with ESRD on dialysis, HFrEF, DM, sickle cell trait, HTN, history of chronic abdominal pain presents for abdominal pain, chest pain, and nausea/vomiting.  Patient reports a week of worsening bilateral lower abdominal pain.  She also reports epigastric/chest discomfort associated with that pain.  She has history of similar presentations in the past.  Her most recent dialysis was 2 days ago.  She denies any shortness of breath, vaginal bleeding, vaginal discharge.  She has had multiple episodes of nonbloody emesis.  She is anuric    The history is provided by the patient and medical records.     Review of patient's allergies indicates:   Allergen Reactions    Penicillins Hives     Past Medical History:   Diagnosis Date    ESRD on hemodialysis 2022    Gastritis 2022    EGD was 22    Gastroparesis 2022    has not had Emptying study    Heart failure with preserved ejection fraction 2022    EF 55% on 3/22    History of supraventricular tachycardia     Hyperkalemia 2022    Hypertensive emergency 2022    Sickle cell trait 2022    Type 2 diabetes mellitus      Past Surgical History:   Procedure Laterality Date     SECTION      x 3    COLONOSCOPY      COLONOSCOPY N/A 2022    Procedure: COLONOSCOPY;  Surgeon: Jagdeep eCdeno MD;  Location: [facility];  Service: Endoscopy;  Laterality: N/A;    ENDOSCOPIC ULTRASOUND OF UPPER GASTROINTESTINAL TRACT N/A 2023    Procedure: ULTRASOUND, UPPER GI TRACT, ENDOSCOPIC;  Surgeon: Micky Paredes III, MD;  Location: [facility];  Service: Endoscopy;  Laterality: N/A;    ESOPHAGOGASTRODUODENOSCOPY N/A 10/18/2019    Procedure: ESOPHAGOGASTRODUODENOSCOPY (EGD);  Surgeon: Gianluca Mendez MD;  Location: [facility];   Service: Endoscopy;  Laterality: N/A;    ESOPHAGOGASTRODUODENOSCOPY N/A 08/24/2022    Procedure: EGD (ESOPHAGOGASTRODUODENOSCOPY);  Surgeon: Micky Paredes III, MD;  Location: [facility] ENDO;  Service: Endoscopy;  Laterality: N/A;    ESOPHAGOGASTRODUODENOSCOPY N/A 12/5/2022    Procedure: EGD (ESOPHAGOGASTRODUODENOSCOPY);  Surgeon: Marcelo Zhong MD;  Location: [facility] ENDO;  Service: Endoscopy;  Laterality: N/A;    INSERTION, CATHETER, TUNNELED N/A 6/17/2023    Procedure: Insertion,catheter,tunneled;  Surgeon: Carlos Thurman Jr., MD;  Location: [facility] OR;  Service: General;  Laterality: N/A;    LAPAROSCOPIC CHOLECYSTECTOMY N/A 07/30/2022    Procedure: CHOLECYSTECTOMY, LAPAROSCOPIC;  Surgeon: Grey Perez MD;  Location: [facility] OR;  Service: General;  Laterality: N/A;    PLACEMENT OF DUAL-LUMEN VASCULAR CATHETER Left 07/12/2022    Procedure: INSERTION-CATHETER-JOSEPH;  Surgeon: Dionte Gan MD;  Location: [facility] OR;  Service: General;  Laterality: Left;    PLACEMENT OF DUAL-LUMEN VASCULAR CATHETER Right 07/26/2022    Procedure: INSERTION-CATHETER-Hemosplit;  Surgeon: Dionte Gan MD;  Location: [facility] OR;  Service: General;  Laterality: Right;     Family History   Problem Relation Age of Onset    Diabetes Mother     Diabetes Father      Social History     Tobacco Use    Smoking status: Never    Smokeless tobacco: Never   Substance Use Topics    Alcohol use: Not Currently    Drug use: No     Review of Systems   Reason unable to perform ROS: See HPI for relevant ROS.       Physical Exam     Initial Vitals [01/25/24 1119]   BP Pulse Resp Temp SpO2   (!) 199/112 82 17 98.7 °F (37.1 °C) 100 %      MAP       --         Physical Exam    Nursing note and vitals reviewed.  Constitutional:   Alert, normal work of breathing, no acute distress   Eyes: Conjunctivae are normal. No scleral icterus.   Cardiovascular:            AV fistula of the left upper extremity with palpable thrill, no erythema or drainage    Pulmonary/Chest: Breath sounds normal. No stridor. No respiratory distress.   Abdominal:   Abdomen soft and nondistended.  No significant abdominal tenderness   Musculoskeletal:         General: No edema.     Neurological: She is alert.   Skin: Skin is warm and dry.         ED Course   Procedures  Labs Reviewed   CBC W/ AUTO DIFFERENTIAL - Abnormal; Notable for the following components:       Result Value    RBC 2.36 (*)     Hemoglobin 6.9 (*)     Hematocrit 20.4 (*)     RDW 18.2 (*)     Platelets 108 (*)     Immature Granulocytes 0.7 (*)     Lymph # 0.8 (*)     Lymph % 17.0 (*)     All other components within normal limits    Narrative:     HGB and HCT critical result(s) called and verbal readback obtained   from Marcelo Hobson. by GEORGIE 01/25/2024 12:42   COMPREHENSIVE METABOLIC PANEL - Abnormal; Notable for the following components:    Sodium 131 (*)     Chloride 94 (*)     Glucose 576 (*)     BUN 30 (*)     Creatinine 7.2 (*)     Albumin 3.3 (*)     Total Bilirubin 1.2 (*)     eGFR 7 (*)     All other components within normal limits    Narrative:     GLUCOSE   critical result(s) called and verbal readback obtained from   SHAILA HOBSON by TN3 01/25/2024 13:05   TROPONIN I - Abnormal; Notable for the following components:    Troponin I 0.036 (*)     All other components within normal limits   B-TYPE NATRIURETIC PEPTIDE - Abnormal; Notable for the following components:    BNP 1,778 (*)     All other components within normal limits   LIPASE   MAGNESIUM   PHOSPHORUS   TYPE & SCREEN   PREPARE RBC SOFT     EKG Readings: (Independently Interpreted)   Sinus rhythm, regular, narrow complex, rate of 80, no STEMI, no ST deviations, overall unchanged compared to prior       Imaging Results              CT Abdomen Pelvis  Without Contrast (Final result)  Result time 01/25/24 13:34:41      Final result by Sunshine Awan MD (01/25/24 13:34:41)                   Impression:      The bladder wall does appear mildly diffusely  thick suggesting cystitis.  This however appears decreased compared to the prior study 10/03/2023.    Additional findings as detailed above including extensive vascular calcifications.  Mild basilar infiltrates decreased compared to the prior exam and resolution of the previously seen pleural effusions.      Electronically signed by: Sunshine Awan MD  Date:    01/25/2024  Time:    13:34               Narrative:    EXAMINATION:  CT ABDOMEN PELVIS WITHOUT CONTRAST    CLINICAL HISTORY:  Nausea/vomiting;Abdominal pain, acute, nonlocalized;Bilateral lower abdomen;    TECHNIQUE:  Low dose axial images, sagittal and coronal reformations were obtained from the lung bases to the pubic symphysis.  No p.o. contrast    COMPARISON:  10/03/2023    FINDINGS:  Visualized lung bases; mild bibasilar infiltrates which appear decreased compared to the prior exam and resolution of the previously seen pleural effusions.  Distal end of venous catheter seen at the level of the right atrium.    Liver unremarkable appearance.  Spleen mildly enlarged measuring 13 cm.  Not significantly changed.  Adrenal glands; slight diffuse thickening the left common not significantly changed    Pancreas atrophied    Gallbladder and bile ducts; cholecystectomy clips.  No biliary duct dilatation    Abdominal aorta; no aneurysm    Stomach, bowel, mesentery; paucity of fat planes which along with lack of p.o. contrast limit evaluation of bowel.  Trace barely perceptible free fluid the pelvis.  No free intraperitoneal air.  Normal appearing appendix is thought to be visualized and no abnormal appendix inflammatory changes appendiceal region is seen.    Kidneys, ureters, bladder; atrophied kidneys.  Renal vascular calcifications.  No hydronephrosis.  Difficult to exclude small renal stones with the degree of vascular calcifications but no definite renal stones are seen and there is no hydronephrosis, ureteral dilatation, opaque ureteral stone or ureteral  obstruction evident.    Prominent vascular calcifications seen elsewhere including the mesenteric vessels.    Urinary bladder decompressed at the time of the exam.  Wall appears mildly diffusely thick but accentuated by the incomplete degree of distention.  Recommend correlation clinically as to if clinical consideration for cystitis or chronic bladder outlet obstruction.  The degree of bladder wall thickening does appear decreased compared to the prior exam.    Reproductive organs; evaluate by ultrasound which could be obtained if indicated clinically.  Uterine calcifications suggesting myometrial calcifications.  Adnexal region unremarkable in appearance.    Mild subcutaneous edema which has decreased.                                       X-Ray Chest AP Portable (Final result)  Result time 01/25/24 12:32:35      Final result by Sunshine Awan MD (01/25/24 12:32:35)                   Impression:      Mildly prominent markings suggesting mild pulmonary vascular distention.  No significant change compared to the prior exam      Electronically signed by: Sunshine Awan MD  Date:    01/25/2024  Time:    12:32               Narrative:    EXAMINATION:  XR CHEST AP PORTABLE    CLINICAL HISTORY:  Chest pain, unspecified    TECHNIQUE:  Single frontal view of the chest was performed.    COMPARISON:  10/13/2023    FINDINGS:  Cardiomediastinal silhouette appears stable.  Right-sided dialysis catheter remains in place.  Mildly prominent perihilar markings questioning mild pulmonary vascular distention.  No confluent infiltrate.  No pleural effusion.  No significant change                                       Medications   amLODIPine tablet 5 mg (0 mg Oral Hold 1/25/24 1330)   hydrALAZINE tablet 100 mg (0 mg Oral Hold 1/25/24 1330)   0.9%  NaCl infusion (for blood administration) (has no administration in time range)   niCARdipine 40 mg/200 mL (0.2 mg/mL) infusion (1 mg/hr Intravenous New Bag 1/25/24 1526)   ketorolac  injection 9.999 mg (9.999 mg Intravenous Given 1/25/24 1303)   famotidine (PF) injection 20 mg (20 mg Intravenous Given 1/25/24 1303)   prochlorperazine injection Soln 10 mg (10 mg Intravenous Given 1/25/24 1303)   hydrALAZINE injection 5 mg (5 mg Intravenous Given 1/25/24 1330)     Medical Decision Making  34 y.o. female with ESRD on dialysis, HFrEF, DM, sickle cell trait, HTN, history of chronic abdominal pain presents for abdominal pain, chest pain, and nausea/vomiting  Differentials include gastroparesis, gastritis, esophagitis, UTI, hypertensive emergency, symptomatic anemia, metabolic derangement, arrhythmia, less likely ACS or PE  Presentation similar to prior episodes, suspect gastroparesis with a esophagitis resulting in chest pain, patient has numerous prior visits with similar symptoms, cardiac workup was initiated as well  EKG shows no evidence of active ischemia  Abdominal exam overall reassuring, no significant abdominal tenderness, no tenderness at McBurney's point.  Patient does have history of cholecystectomy  No evidence of fluid overload, no respiratory distress, lungs clear, no hypoxia  Patient is anuric, no vaginal symptoms    Amount and/or Complexity of Data Reviewed  Labs: ordered. Decision-making details documented in ED Course.  Radiology: ordered. Decision-making details documented in ED Course.    Risk  Prescription drug management.               ED Course as of 01/25/24 1732   Thu Jan 25, 2024   1214 X-Ray Chest AP Portable  Findings, per independent interpretation:  Symmetrically expanded lungs, no focal consolidation, no evidence of edema.  Indwelling catheter of the right chest [OK]   1248 Hemoglobin(!!): 6.9 [OK]   1334 BNP(!): 1,778  Below baseline [OK]   1334 Troponin I(!): 0.036  At baseline [OK]   1703 Workup remarkable for anemia of 6.9, suspect secondary to chronic renal disease, no evidence of bleeding.  Potassium within normal limits, patient is also hyperglycemic without an  anion gap or acidosis.  She continues to be hypertensive, discussed with Nephrology for dialysis.  Will give 1 unit PRBCs, patient admitted to hospital medicine [OK]   1703 CT Abdomen Pelvis  Without Contrast  No acute findings, stranding around the bladder though decreased compared to prior [OK]      ED Course User Index  [OK] Nick Muller MD                           Clinical Impression:  Final diagnoses:  [R07.9] Chest pain  [D64.9] Symptomatic anemia (Primary)          ED Disposition Condition    Observation                 Nick Muller MD  01/25/24 2125

## 2024-01-25 NOTE — PHARMACY MED REC
"Admission Medication History     The home medication history was taken by Jose Maldonado.    You may go to "Admission" then "Reconcile Home Medications" tabs to review and/or act upon these items.     The home medication list has been updated by the Pharmacy department.   Please read ALL comments highlighted in yellow.   Please address this information as you see fit.    Feel free to contact us if you have any questions or require assistance.        Medications listed below were obtained from: Patient/family and Analytic software- [employer]  No current facility-administered medications on file prior to encounter.     Current Outpatient Medications on File Prior to Encounter   Medication Sig Dispense Refill    amLODIPine (NORVASC) 5 MG tablet Take 1 tablet (5 mg total) by mouth once daily. (Patient taking differently: Take 5 mg by mouth once daily.) 30 tablet 1    apixaban (ELIQUIS) 5 mg Tab Take 1 tablet (5 mg total) by mouth 2 (two) times daily. 180 tablet 1    carvediloL (COREG) 25 MG tablet Take 1 tablet (25 mg total) by mouth 2 (two) times daily. 60 tablet 5    cetirizine (ZYRTEC) 10 MG tablet Take 1 tablet (10 mg total) by mouth once daily. 30 tablet 0    FLUoxetine 20 MG capsule Take 1 capsule (20 mg total) by mouth once daily. 30 capsule 5    fluticasone propionate (FLONASE ALLERGY RELIEF) 50 mcg/actuation nasal spray [place] 1 spray every day by intranasal route. (Patient taking differently: 1 spray by Each Nostril route Daily.) 16 g 2    gabapentin (NEURONTIN) 300 MG capsule Take 2 capsules twice a day by oral route for 90 days. (Patient taking differently: Take 600 mg by mouth 2 (two) times daily.) 360 capsule 1    hydrALAZINE (APRESOLINE) 100 MG tablet Take 1 tablet (100 mg total) by mouth 2 (two) times daily. 180 tablet 1    insulin aspart U-100 (NOVOLOG FLEXPEN U-100 INSULIN) 100 unit/mL (3 mL) InPn pen Inject 8 units 3 times a day by subcutaneous route before meals (Patient taking differently: Inject 8 " Units into the skin 3 (three) times daily before meals.) 24 mL 1    insulin detemir U-100, Levemir, (LEVEMIR FLEXTOUCH U100 INSULIN) 100 unit/mL (3 mL) InPn pen Inject 21 units every day by subcutaneous route in the evening for 90 days. (Patient taking differently: Inject 21 Units into the skin every evening.) 15 mL 1    isosorbide mononitrate (IMDUR) 30 MG 24 hr tablet Take 1 tablet (30 mg total) by mouth once daily. 90 tablet 1    levETIRAcetam (KEPPRA) 500 MG Tab Take 1 tablet twice a day by oral route for 30 days. (Patient taking differently: Take 500 mg by mouth 2 (two) times daily.) 60 tablet 2    ondansetron (ZOFRAN-ODT) 4 MG TbDL Place 1 tablet (4 mg total) under the tongue every 8 (eight) hours. 24 tablet 2    oxyCODONE-acetaminophen (PERCOCET) 5-325 mg per tablet Take 1 tablet by mouth every 6 (six) hours as needed for pain. (Patient taking differently: Take 1 tablet by mouth every 6 (six) hours as needed for Pain.) 15 tablet 0    pantoprazole (PROTONIX) 40 MG tablet Take 1 tablet (40 mg total) by mouth once daily. 90 tablet 1    promethazine (PHENERGAN) 25 MG tablet Take 1 tablet (25 mg total) by mouth every 6 (six) hours as needed for 5 days. (Patient taking differently: Take 25 mg by mouth every 6 (six) hours as needed for Nausea.) 20 tablet 2    sevelamer carbonate (RENVELA) 800 mg Tab Take 2 tablets with meals three times a day (Patient taking differently: Take 1,600 mg by mouth 3 (three) times daily with meals.) 180 tablet 11    sucralfate (CARAFATE) 100 mg/mL suspension Take 10 mL 4 times a day by oral route before meals for 30 days. (Patient taking differently: Take 1 g by mouth 4 (four) times daily with meals and nightly.) 1200 mL 1    blood sugar diagnostic (TRUE METRIX GLUCOSE TEST STRIP) Strp Use 1 strip to check blood glucose three times daily 100 each 11    blood-glucose meter (TRUE METRIX GLUCOSE METER) Misc Use to check blood glucose 1 each 0    FLUoxetine 20 MG capsule Take 1 capsule every  "day by oral route for 90 days. 90 capsule 1    lancets (TRUEPLUS LANCETS) 33 gauge Misc Use 1 to check blood glucose three times daily 100 each 11    lancets 33 gauge Misc Use to test twice daily 100 each 5    oxyCODONE-acetaminophen (PERCOCET) 5-325 mg per tablet TAKE 1 TABLET EVERY 6 HOURS AS NEEDED FOP PAIN (Patient taking differently: Take 1 tablet by mouth every 6 (six) hours as needed for Pain.) 15 tablet 0    pen needle, diabetic 31 gauge x 3/16" Ndle Use as directed to inject insulin 5 times daily 100 each 11    sucroferric oxyhydroxide (VELPHORO) 500 mg Chew Take 1 tablet 3 times a day (Patient taking differently: Take 1 tablet by mouth 3 (three) times daily.) 90 tablet 6    [DISCONTINUED] atenoloL (TENORMIN) 50 MG tablet Take 1 tablet (50 mg total) by mouth every other day. 45 tablet 3    [DISCONTINUED] omeprazole (PRILOSEC) 20 MG capsule Take 2 capsules (40 mg total) by mouth once daily. for 10 days 20 capsule 0         Jose Maldonado  EXT 1925                .          "

## 2024-01-25 NOTE — ASSESSMENT & PLAN NOTE
Patient does not make urine but has lower abdominal pain and back pain as well as CT imaging of bladder wall thickening.  Will treat with IV ceftriaxone empirically

## 2024-01-25 NOTE — SUBJECTIVE & OBJECTIVE
Past Medical History:   Diagnosis Date    ESRD on hemodialysis 2022    Gastritis 2022    EGD was 22    Gastroparesis 2022    has not had Emptying study    Heart failure with preserved ejection fraction 2022    EF 55% on 3/22    History of supraventricular tachycardia     Hyperkalemia 2022    Hypertensive emergency 2022    Sickle cell trait 2022    Type 2 diabetes mellitus        Past Surgical History:   Procedure Laterality Date     SECTION      x 3    COLONOSCOPY      COLONOSCOPY N/A 2022    Procedure: COLONOSCOPY;  Surgeon: Jagdeep Cedeno MD;  Location: [facility];  Service: Endoscopy;  Laterality: N/A;    ENDOSCOPIC ULTRASOUND OF UPPER GASTROINTESTINAL TRACT N/A 2023    Procedure: ULTRASOUND, UPPER GI TRACT, ENDOSCOPIC;  Surgeon: Micky Paredes III, MD;  Location: [facility];  Service: Endoscopy;  Laterality: N/A;    ESOPHAGOGASTRODUODENOSCOPY N/A 10/18/2019    Procedure: ESOPHAGOGASTRODUODENOSCOPY (EGD);  Surgeon: Gianluca Mendez MD;  Location: [facility];  Service: Endoscopy;  Laterality: N/A;    ESOPHAGOGASTRODUODENOSCOPY N/A 2022    Procedure: EGD (ESOPHAGOGASTRODUODENOSCOPY);  Surgeon: Micky Paredes III, MD;  Location: [facility];  Service: Endoscopy;  Laterality: N/A;    ESOPHAGOGASTRODUODENOSCOPY N/A 2022    Procedure: EGD (ESOPHAGOGASTRODUODENOSCOPY);  Surgeon: Marcelo Zhong MD;  Location: [facility] ENDO;  Service: Endoscopy;  Laterality: N/A;    INSERTION, CATHETER, TUNNELED N/A 2023    Procedure: Insertion,catheter,tunneled;  Surgeon: Carlos Thurman Jr., MD;  Location: [facility] OR;  Service: General;  Laterality: N/A;    LAPAROSCOPIC CHOLECYSTECTOMY N/A 2022    Procedure: CHOLECYSTECTOMY, LAPAROSCOPIC;  Surgeon: Grey Perez MD;  Location: [facility] OR;  Service: General;  Laterality: N/A;    PLACEMENT OF DUAL-LUMEN VASCULAR CATHETER Left 2022    Procedure: INSERTION-CATHETER-JOSEPH;  Surgeon: Dionte KATZ  MD Elvia;  Location: [facility] OR;  Service: General;  Laterality: Left;    PLACEMENT OF DUAL-LUMEN VASCULAR CATHETER Right 07/26/2022    Procedure: INSERTION-CATHETER-Hemosplit;  Surgeon: Dionte Gan MD;  Location: [facility] OR;  Service: General;  Laterality: Right;       Review of patient's allergies indicates:   Allergen Reactions    Penicillins Hives       No current facility-administered medications on file prior to encounter.     Current Outpatient Medications on File Prior to Encounter   Medication Sig    amLODIPine (NORVASC) 5 MG tablet Take 1 tablet (5 mg total) by mouth once daily. (Patient taking differently: Take 5 mg by mouth once daily.)    apixaban (ELIQUIS) 5 mg Tab Take 1 tablet (5 mg total) by mouth 2 (two) times daily.    carvediloL (COREG) 25 MG tablet Take 1 tablet (25 mg total) by mouth 2 (two) times daily.    cetirizine (ZYRTEC) 10 MG tablet Take 1 tablet (10 mg total) by mouth once daily.    FLUoxetine 20 MG capsule Take 1 capsule (20 mg total) by mouth once daily.    fluticasone propionate (FLONASE ALLERGY RELIEF) 50 mcg/actuation nasal spray [place] 1 spray every day by intranasal route. (Patient taking differently: 1 spray by Each Nostril route Daily.)    gabapentin (NEURONTIN) 300 MG capsule Take 2 capsules twice a day by oral route for 90 days. (Patient taking differently: Take 600 mg by mouth 2 (two) times daily.)    hydrALAZINE (APRESOLINE) 100 MG tablet Take 1 tablet (100 mg total) by mouth 2 (two) times daily.    insulin aspart U-100 (NOVOLOG FLEXPEN U-100 INSULIN) 100 unit/mL (3 mL) InPn pen Inject 8 units 3 times a day by subcutaneous route before meals (Patient taking differently: Inject 8 Units into the skin 3 (three) times daily before meals.)    insulin detemir U-100, Levemir, (LEVEMIR FLEXTOUCH U100 INSULIN) 100 unit/mL (3 mL) InPn pen Inject 21 units every day by subcutaneous route in the evening for 90 days. (Patient taking differently: Inject 21 Units into the skin every  "evening.)    isosorbide mononitrate (IMDUR) 30 MG 24 hr tablet Take 1 tablet (30 mg total) by mouth once daily.    levETIRAcetam (KEPPRA) 500 MG Tab Take 1 tablet twice a day by oral route for 30 days. (Patient taking differently: Take 500 mg by mouth 2 (two) times daily.)    ondansetron (ZOFRAN-ODT) 4 MG TbDL Place 1 tablet (4 mg total) under the tongue every 8 (eight) hours.    oxyCODONE-acetaminophen (PERCOCET) 5-325 mg per tablet Take 1 tablet by mouth every 6 (six) hours as needed for pain. (Patient taking differently: Take 1 tablet by mouth every 6 (six) hours as needed for Pain.)    pantoprazole (PROTONIX) 40 MG tablet Take 1 tablet (40 mg total) by mouth once daily.    promethazine (PHENERGAN) 25 MG tablet Take 1 tablet (25 mg total) by mouth every 6 (six) hours as needed for 5 days. (Patient taking differently: Take 25 mg by mouth every 6 (six) hours as needed for Nausea.)    sevelamer carbonate (RENVELA) 800 mg Tab Take 2 tablets with meals three times a day (Patient taking differently: Take 1,600 mg by mouth 3 (three) times daily with meals.)    sucralfate (CARAFATE) 100 mg/mL suspension Take 10 mL 4 times a day by oral route before meals for 30 days. (Patient taking differently: Take 1 g by mouth 4 (four) times daily with meals and nightly.)    blood sugar diagnostic (TRUE METRIX GLUCOSE TEST STRIP) Strp Use 1 strip to check blood glucose three times daily    blood-glucose meter (TRUE METRIX GLUCOSE METER) Misc Use to check blood glucose    FLUoxetine 20 MG capsule Take 1 capsule every day by oral route for 90 days.    lancets (TRUEPLUS LANCETS) 33 gauge Misc Use 1 to check blood glucose three times daily    lancets 33 gauge Misc Use to test twice daily    oxyCODONE-acetaminophen (PERCOCET) 5-325 mg per tablet TAKE 1 TABLET EVERY 6 HOURS AS NEEDED FOP PAIN (Patient taking differently: Take 1 tablet by mouth every 6 (six) hours as needed for Pain.)    pen needle, diabetic 31 gauge x 3/16" Ndle Use as " directed to inject insulin 5 times daily    sucroferric oxyhydroxide (VELPHORO) 500 mg Chew Take 1 tablet 3 times a day (Patient taking differently: Take 1 tablet by mouth 3 (three) times daily.)    [DISCONTINUED] atenoloL (TENORMIN) 50 MG tablet Take 1 tablet (50 mg total) by mouth every other day.    [DISCONTINUED] omeprazole (PRILOSEC) 20 MG capsule Take 2 capsules (40 mg total) by mouth once daily. for 10 days     Family History       Problem Relation (Age of Onset)    Diabetes Mother, Father          Tobacco Use    Smoking status: Never    Smokeless tobacco: Never   Substance and Sexual Activity    Alcohol use: Not Currently    Drug use: No    Sexual activity: Not Currently     Partners: Male     Birth control/protection: I.U.D.     Review of Systems   Constitutional:  Positive for activity change, appetite change and fatigue. Negative for chills and fever.   HENT:  Negative for congestion and rhinorrhea.    Respiratory:  Negative for cough, shortness of breath and wheezing.    Cardiovascular:  Negative for chest pain, palpitations and leg swelling.   Gastrointestinal:  Positive for abdominal pain (bilateral lower quadrant), nausea and vomiting. Negative for abdominal distention.   Endocrine: Negative for cold intolerance and heat intolerance.   Musculoskeletal:  Negative for arthralgias and neck stiffness.   Skin:  Negative for rash and wound.   Neurological:  Positive for weakness and light-headedness. Negative for dizziness.     Objective:     Vital Signs (Most Recent):  Temp: 98.7 °F (37.1 °C) (01/25/24 1119)  Pulse: 80 (01/25/24 1430)  Resp: 17 (01/25/24 1119)  BP: (!) 215/100 (01/25/24 1430)  SpO2: 100 % (01/25/24 1430) Vital Signs (24h Range):  Temp:  [98.7 °F (37.1 °C)] 98.7 °F (37.1 °C)  Pulse:  [80-82] 80  Resp:  [17] 17  SpO2:  [83 %-100 %] 100 %  BP: (199-255)/(100-133) 215/100        There is no height or weight on file to calculate BMI.     Physical Exam  Constitutional:       General: She is not  in acute distress.     Appearance: She is well-developed. She is ill-appearing.      Comments: Keeps eyes closed, responds to questions appropriately when repeatedly asked questions   HENT:      Head: Normocephalic and atraumatic.   Eyes:      Conjunctiva/sclera: Conjunctivae normal.   Cardiovascular:      Rate and Rhythm: Normal rate and regular rhythm.      Heart sounds: No murmur heard.  Pulmonary:      Effort: Pulmonary effort is normal. No respiratory distress.      Breath sounds: Rales (bibasilar) present. No wheezing.   Abdominal:      General: Bowel sounds are normal. There is no distension.      Palpations: Abdomen is soft.      Tenderness: There is no abdominal tenderness.   Musculoskeletal:         General: Normal range of motion.   Skin:     General: Skin is warm and dry.      Findings: No rash.   Neurological:      Mental Status: She is oriented to person, place, and time.      Cranial Nerves: No cranial nerve deficit.   Psychiatric:         Behavior: Behavior normal.                Significant Labs: All pertinent labs within the past 24 hours have been reviewed.    Significant Imaging: I have reviewed all pertinent imaging results/findings within the past 24 hours.

## 2024-01-26 NOTE — NURSING
[place] through HD tx dialysis machine started alarming high temp  Dialysate stopped, continued UF, notified MD of possible need to terminate tx and change rooms  Blood had been scanned but not yet spiked  Orders received to restart tx if pt required another room    Maintenance unable to fix water issue    All blood returned to pt  Net UF 1.75 L    Pt stable

## 2024-01-26 NOTE — ED NOTES
Blood was scanned at 1700 and never started since the water was hot and stopped dialysis. Blood was put on ice and until pt was moved to Room 10. New dialysis machine was also brought to room and blood was re scanned and started. Cardene remains at 1mg/hr and infusing at 5ml/hr. She is awake, alert and oriented and no acute distress.

## 2024-01-26 NOTE — ED NOTES
Water in the bay #1and #2 is only set with hot water, pt will have to move once more to Room 10 to accommodate for dialysis.

## 2024-01-26 NOTE — DISCHARGE SUMMARY
[facility] Medicine  Discharge Summary      Patient Name: Tabby Howard  MRN: [id number]  UNIQUE: [id number]  Patient Class: OP- Observation  Admission Date: 1/25/2024  Hospital Length of Stay: 0 days  Discharge Date and Time: 1/25/2024  9:02 PM  Attending Physician: No att. providers found   Discharging Provider: Linda Cueto MD  Primary Care Provider: Melony Kim NP    Primary Care Team: Networked reference to record PCT     HPI:   Patient is a 34-year-old female with a past medical history of ESRD on HD, gastroparesis, SVT, hyperkalemia, hypertensive emergency, type 2 diabetes who presents to the emergency room today for evaluation of abdominal pain and nausea and vomiting.  She denies chest pain to me.  Today is her regular dialysis day and she reports to me that she went to dialysis but then was sent here for low blood count needing a transfusion.  She reports that she has been feeling poorly since this morning.  She reports that she has been checking her blood sugars and they have been high.  She denies taking any of her medications this morning including blood pressure medications.  She reports her abdominal pain is bilateral lower quadrants and she has back pain as well.  In the emergency department her blood pressure is grossly elevated.  Her glucose is 585.  Anion gap and CO2 were normal.  CT abdomen was obtained with concern for cystitis.  Patient does not make urine.  Hemoglobin is 6.9.  Nephrology has been consulted and she will get a unit of blood and dialysis.  I discussed with Dr. Clark.  Will also start patient on Cardene drip      * No surgery found *      Hospital Course:   Patient was admitted to the hospital medicine service for symptomatic anemia.  She underwent dialysis per Nephrology with 1 unit of PRBCs transfused.  She was placed on Cardene drip for hypertensive urgency.  She was started on IV ceftriaxone for possible cystitis.  She left AMA after dialysis.      Goals of Care Treatment Preferences:  Code Status: Full Code    Health care agent: Step-Mother Tanya Howard / Father Garcia Howard  Health care agent number: [phone] / [phone]                   Consults:     No new Assessment & Plan notes have been filed under this hospital service since the last note was generated.  Service: Hospital Medicine    Final Active Diagnoses:    Diagnosis Date Noted POA    PRINCIPAL PROBLEM:  Symptomatic anemia [D64.9] 10/06/2022 Yes    Cystitis [N30.90] 01/25/2024 Yes    Hypertension [I10] 06/17/2023 Yes    Anemia in ESRD (end-stage renal disease) [N18.6, D63.1] 03/20/2023 Yes    Chronic congestive heart failure with left ventricular diastolic dysfunction [I50.32] 03/02/2023 Yes    Type 2 diabetes mellitus with hyperglycemia, with long-term current use of insulin, previous HbA1c 5.8% [E11.65, Z79.4] 01/27/2023 Not Applicable    ESRD (end stage renal disease) [N18.6] 07/22/2022 Yes    Seizure disorder [G40.909] 05/29/2022 Yes     Chronic      Problems Resolved During this Admission:       Discharged Condition: against medical advice    Disposition: Left Against Medical Adv*    Follow Up:    Patient Instructions:   No discharge procedures on file.    Significant Diagnostic Studies: Labs: BMP:   Recent Labs   Lab 01/25/24  1204 01/25/24  1314   *  --    *  --    K 3.8  --    CL 94*  --    CO2 24  --    BUN 30*  --    CREATININE 7.2*  --    CALCIUM 8.8  --    MG  --  2.0    and CBC   Recent Labs   Lab 01/25/24  1204   WBC 4.53   HGB 6.9*   HCT 20.4*   *   Radiology Results (last 7 days)    Procedure Component Value Units Date/Time   CT Abdomen Pelvis Without Contrast [[id number]] Resulted: 01/25/24 1334   Order Status: Completed Updated: 01/25/24 1337   Narrative:     EXAMINATION:  CT ABDOMEN PELVIS WITHOUT CONTRAST    CLINICAL HISTORY:  Nausea/vomiting;Abdominal pain, acute, nonlocalized;Bilateral lower abdomen;    TECHNIQUE:  Low dose axial images,  sagittal and coronal reformations were obtained from the lung bases to the pubic symphysis.  No p.o. contrast    COMPARISON:  10/03/2023    FINDINGS:  Visualized lung bases; mild bibasilar infiltrates which appear decreased compared to the prior exam and resolution of the previously seen pleural effusions.  Distal end of venous catheter seen at the level of the right atrium.    Liver unremarkable appearance.  Spleen mildly enlarged measuring 13 cm.  Not significantly changed.  Adrenal glands; slight diffuse thickening the left common not significantly changed    Pancreas atrophied    Gallbladder and bile ducts; cholecystectomy clips.  No biliary duct dilatation    Abdominal aorta; no aneurysm    Stomach, bowel, mesentery; paucity of fat planes which along with lack of p.o. contrast limit evaluation of bowel.  Trace barely perceptible free fluid the pelvis.  No free intraperitoneal air.  Normal appearing appendix is thought to be visualized and no abnormal appendix inflammatory changes appendiceal region is seen.    Kidneys, ureters, bladder; atrophied kidneys.  Renal vascular calcifications.  No hydronephrosis.  Difficult to exclude small renal stones with the degree of vascular calcifications but no definite renal stones are seen and there is no hydronephrosis, ureteral dilatation, opaque ureteral stone or ureteral obstruction evident.    Prominent vascular calcifications seen elsewhere including the mesenteric vessels.    Urinary bladder decompressed at the time of the exam.  Wall appears mildly diffusely thick but accentuated by the incomplete degree of distention.  Recommend correlation clinically as to if clinical consideration for cystitis or chronic bladder outlet obstruction.  The degree of bladder wall thickening does appear decreased compared to the prior exam.    Reproductive organs; evaluate by ultrasound which could be obtained if indicated clinically.  Uterine calcifications suggesting myometrial  "calcifications.  Adnexal region unremarkable in appearance.    Mild subcutaneous edema which has decreased.   Impression:       The bladder wall does appear mildly diffusely thick suggesting cystitis.  This however appears decreased compared to the prior study 10/03/2023.    Additional findings as detailed above including extensive vascular calcifications.  Mild basilar infiltrates decreased compared to the prior exam and resolution of the previously seen pleural effusions.      Electronically signed by: Sunshine Awan MD  Date: 01/25/2024  Time: 13:34   X-Ray Chest AP Portable [[id number]] Resulted: 01/25/24 1232   Order Status: Completed Updated: 01/25/24 1235   Narrative:     EXAMINATION:  XR CHEST AP PORTABLE    CLINICAL HISTORY:  Chest pain, unspecified    TECHNIQUE:  Single frontal view of the chest was performed.    COMPARISON:  10/13/2023    FINDINGS:  Cardiomediastinal silhouette appears stable.  Right-sided dialysis catheter remains in place.  Mildly prominent perihilar markings questioning mild pulmonary vascular distention.  No confluent infiltrate.  No pleural effusion.  No significant change   Impression:       Mildly prominent markings suggesting mild pulmonary vascular distention.  No significant change compared to the prior exam      Electronically signed by: Sunshine Awan MD  Date: 01/25/2024  Time: 12:32       Pending Diagnostic Studies:       None           Medications:  Reconciled Home Medications:      Medication List        ASK your doctor about these medications      amLODIPine 5 MG tablet  Commonly known as: NORVASC  Take 1 tablet (5 mg total) by mouth once daily.     BD ULTRA-FINE MINI PEN NEEDLE 31 gauge x 3/16" Ndle  Generic drug: pen needle, diabetic  Use as directed to inject insulin 5 times daily     carvediloL 25 MG tablet  Commonly known as: COREG  Take 1 tablet (25 mg total) by mouth 2 (two) times daily.     cetirizine 10 MG tablet  Commonly known as: ZYRTEC  Take 1 tablet (10 mg " total) by mouth once daily.     ELIQUIS 5 mg Tab  Generic drug: apixaban  Take 1 tablet (5 mg total) by mouth 2 (two) times daily.     * FLUoxetine 20 MG capsule  Take 1 capsule (20 mg total) by mouth once daily.     * FLUoxetine 20 MG capsule  Take 1 capsule every day by oral route for 90 days.     fluticasone propionate 50 mcg/actuation nasal spray  Commonly known as: FLONASE ALLERGY RELIEF  Spray 1 spray every day by intranasal route.     gabapentin 300 MG capsule  Commonly known as: NEURONTIN  Take 2 capsules twice a day by oral route for 90 days.     hydrALAZINE 100 MG tablet  Commonly known as: APRESOLINE  Take 1 tablet (100 mg total) by mouth 2 (two) times daily.     insulin aspart U-100 100 unit/mL (3 mL) Inpn pen  Commonly known as: NovoLOG Flexpen U-100 Insulin  Inject 8 units 3 times a day by subcutaneous route before meals     isosorbide mononitrate 30 MG 24 hr tablet  Commonly known as: IMDUR  Take 1 tablet (30 mg total) by mouth once daily.     * lancets 33 gauge Misc  Use to test twice daily     * TRUEPLUS LANCETS 33 gauge Misc  Generic drug: lancets  Use 1 to check blood glucose three times daily     LEVEMIR FLEXPEN 100 unit/mL (3 mL) Inpn pen  Generic drug: insulin detemir U-100 (Levemir)  Inject 21 units every day by subcutaneous route in the evening for 90 days.     levETIRAcetam 500 MG Tab  Commonly known as: KEPPRA  Take 1 tablet twice a day by oral route for 30 days.     ondansetron 4 MG Tbdl  Commonly known as: ZOFRAN-ODT  Place 1 tablet (4 mg total) under the tongue every 8 (eight) hours.     * oxyCODONE-acetaminophen 5-325 mg per tablet  Commonly known as: PERCOCET  TAKE 1 TABLET EVERY 6 HOURS AS NEEDED FOP PAIN     * oxyCODONE-acetaminophen 5-325 mg per tablet  Commonly known as: PERCOCET  Take 1 tablet by mouth every 6 (six) hours as needed for pain.     pantoprazole 40 MG tablet  Commonly known as: PROTONIX  Take 1 tablet (40 mg total) by mouth once daily.     promethazine 25 MG  tablet  Commonly known as: PHENERGAN  Take 1 tablet (25 mg total) by mouth every 6 (six) hours as needed for 5 days.     RENVELA 800 mg Tab  Generic drug: sevelamer carbonate  Take 2 tablets with meals three times a day     sucralfate 100 mg/mL suspension  Commonly known as: CARAFATE  Take 10 mL 4 times a day by oral route before meals for 30 days.     TRUE METRIX GLUCOSE METER Misc  Generic drug: blood-glucose meter  Use to check blood glucose     TRUE METRIX GLUCOSE TEST STRIP Strp  Generic drug: blood sugar diagnostic  Use 1 strip to check blood glucose three times daily     VELPHORO 500 mg Chew  Generic drug: sucroferric oxyhydroxide  Take 1 tablet 3 times a day           * This list has 6 medication(s) that are the same as other medications prescribed for you. Read the directions carefully, and ask your doctor or other care provider to review them with you.                  Indwelling Lines/Drains at time of discharge:   Lines/Drains/Airways       Central Venous Catheter Line  Duration                  Hemodialysis Catheter 06/17/23 1135 right internal jugular 223 days                    Time spent on the discharge of patient: 35 minutes         Linda Cueto MD  Department of Hospital Medicine  [facility]

## 2024-01-26 NOTE — PROGRESS NOTES
HD tx completed, tolerated well  1 unit PRBC administered  Net UF 1.5 L  Total for both treatments 3.3 L  HD catheter dressing changed  Pt educated re hypertension          01/25/24 2050   Handoff Report   Received From Stephanie Mena   Given To Jasen   Vital Signs   Temp 97.4 °F (36.3 °C)   Temp Source Oral   Pulse 84   SpO2 100 %   BP (!) 189/90   BP Location Right arm   BP Method Automatic   Patient Position Lying   Assessments (Pre/Post)   Blood Liters Processed (BLP) 27.1   Transport Modality ambulatory   Level of Consciousness (AVPU) alert   Dialyzer Clearance mildly streaked   Post-Hemodialysis Assessment   Rinseback Volume (mL) 250 mL   Blood Volume Processed (Liters) 27.1 L   Dialyzer Clearance Lightly streaked   Duration of Treatment 90 minutes   Additional Fluid Intake (mL) 500 mL   Total UF (mL) 2500 mL   Net Fluid Removal 1527   Patient Response to Treatment Tolerated well   Post-Hemodialysis Comments Tx complete, no issues

## 2024-01-26 NOTE — CONSULTS
Consulted for dialysis - admitted with HTN emergency and severe anemia- initiated cardene gtt in conversation with Dr. Cueto- ordered HD/UF with blood transfusion. Patient left AMA after dialysis before I could see her.    Prateek Clark MD MPH  [facility] [address] [address]  [phone] (p)  [phone] (f)

## 2024-01-26 NOTE — HOSPITAL COURSE
Patient was admitted to the hospital medicine service for symptomatic anemia.  She underwent dialysis per Nephrology with 1 unit of PRBCs transfused.  She was placed on Cardene drip for hypertensive urgency.  She was started on IV ceftriaxone for possible cystitis.  She left AMA after dialysis.

## 2024-02-06 ENCOUNTER — HOSPITAL ENCOUNTER (OUTPATIENT)
Facility: HOSPITAL | Age: 34
Discharge: HOME | End: 2024-02-08
Attending: EMERGENCY MEDICINE | Admitting: INTERNAL MEDICINE
Payer: MEDICAID

## 2024-02-06 DIAGNOSIS — N18.6 ESRD (END STAGE RENAL DISEASE): Primary | ICD-10-CM

## 2024-02-06 DIAGNOSIS — R07.9 CHEST PAIN: ICD-10-CM

## 2024-02-06 DIAGNOSIS — I16.1 HYPERTENSIVE EMERGENCY: ICD-10-CM

## 2024-02-06 DIAGNOSIS — R10.9 ABDOMINAL PAIN: ICD-10-CM

## 2024-02-06 DIAGNOSIS — R52 GENERALIZED PAIN: ICD-10-CM

## 2024-02-06 LAB
ALBUMIN SERPL BCP-MCNC: 4.5 G/DL (ref 3.5–5.2)
ALBUMIN SERPL BCP-MCNC: 4.9 G/DL (ref 3.5–5.2)
ALP SERPL-CCNC: 135 U/L (ref 55–135)
ALP SERPL-CCNC: 148 U/L (ref 55–135)
ALT SERPL W/O P-5'-P-CCNC: 11 U/L (ref 10–44)
ALT SERPL W/O P-5'-P-CCNC: 12 U/L (ref 10–44)
AMMONIA PLAS-SCNC: 33 UMOL/L (ref 10–50)
ANION GAP SERPL CALC-SCNC: 17 MMOL/L (ref 8–16)
ANION GAP SERPL CALC-SCNC: 18 MMOL/L (ref 8–16)
AST SERPL-CCNC: 24 U/L (ref 10–40)
AST SERPL-CCNC: 28 U/L (ref 10–40)
BASOPHILS # BLD AUTO: 0.05 K/UL (ref 0–0.2)
BASOPHILS NFR BLD: 0.7 % (ref 0–1.9)
BILIRUB SERPL-MCNC: 1.2 MG/DL (ref 0.1–1)
BILIRUB SERPL-MCNC: 1.3 MG/DL (ref 0.1–1)
BUN SERPL-MCNC: 41 MG/DL (ref 6–20)
BUN SERPL-MCNC: 41 MG/DL (ref 6–20)
CALCIUM SERPL-MCNC: 8.7 MG/DL (ref 8.7–10.5)
CALCIUM SERPL-MCNC: 9.5 MG/DL (ref 8.7–10.5)
CHLORIDE SERPL-SCNC: 95 MMOL/L (ref 95–110)
CHLORIDE SERPL-SCNC: 97 MMOL/L (ref 95–110)
CO2 SERPL-SCNC: 22 MMOL/L (ref 23–29)
CO2 SERPL-SCNC: 23 MMOL/L (ref 23–29)
CREAT SERPL-MCNC: 8.4 MG/DL (ref 0.5–1.4)
CREAT SERPL-MCNC: 8.5 MG/DL (ref 0.5–1.4)
DIFFERENTIAL METHOD BLD: ABNORMAL
EOSINOPHIL # BLD AUTO: 0.2 K/UL (ref 0–0.5)
EOSINOPHIL NFR BLD: 3.5 % (ref 0–8)
ERYTHROCYTE [DISTWIDTH] IN BLOOD BY AUTOMATED COUNT: 18.2 % (ref 11.5–14.5)
EST. GFR  (NO RACE VARIABLE): 5.8 ML/MIN/1.73 M^2
EST. GFR  (NO RACE VARIABLE): 5.9 ML/MIN/1.73 M^2
GLUCOSE SERPL-MCNC: 297 MG/DL (ref 70–110)
GLUCOSE SERPL-MCNC: 307 MG/DL (ref 70–110)
HCT VFR BLD AUTO: 30.9 % (ref 37–48.5)
HGB BLD-MCNC: 10.3 G/DL (ref 12–16)
IMM GRANULOCYTES # BLD AUTO: 0.04 K/UL (ref 0–0.04)
IMM GRANULOCYTES NFR BLD AUTO: 0.6 % (ref 0–0.5)
LIPASE SERPL-CCNC: 13 U/L (ref 4–60)
LYMPHOCYTES # BLD AUTO: 0.5 K/UL (ref 1–4.8)
LYMPHOCYTES NFR BLD: 7.5 % (ref 18–48)
MCH RBC QN AUTO: 30.1 PG (ref 27–31)
MCHC RBC AUTO-ENTMCNC: 33.3 G/DL (ref 32–36)
MCV RBC AUTO: 90 FL (ref 82–98)
MONOCYTES # BLD AUTO: 0.2 K/UL (ref 0.3–1)
MONOCYTES NFR BLD: 2.9 % (ref 4–15)
NEUTROPHILS # BLD AUTO: 5.8 K/UL (ref 1.8–7.7)
NEUTROPHILS NFR BLD: 84.8 % (ref 38–73)
NRBC BLD-RTO: 0 /100 WBC
PLATELET # BLD AUTO: 135 K/UL (ref 150–450)
PMV BLD AUTO: 11.2 FL (ref 9.2–12.9)
POTASSIUM SERPL-SCNC: 5.4 MMOL/L (ref 3.5–5.1)
POTASSIUM SERPL-SCNC: 6.1 MMOL/L (ref 3.5–5.1)
PROT SERPL-MCNC: 8 G/DL (ref 6–8.4)
PROT SERPL-MCNC: 8.7 G/DL (ref 6–8.4)
RBC # BLD AUTO: 3.42 M/UL (ref 4–5.4)
SODIUM SERPL-SCNC: 136 MMOL/L (ref 136–145)
SODIUM SERPL-SCNC: 136 MMOL/L (ref 136–145)
WBC # BLD AUTO: 6.81 K/UL (ref 3.9–12.7)

## 2024-02-06 PROCEDURE — 63600175 PHARM REV CODE 636 W HCPCS: Performed by: EMERGENCY MEDICINE

## 2024-02-06 PROCEDURE — 85025 COMPLETE CBC W/AUTO DIFF WBC: CPT | Performed by: EMERGENCY MEDICINE

## 2024-02-06 PROCEDURE — 96365 THER/PROPH/DIAG IV INF INIT: CPT

## 2024-02-06 PROCEDURE — 96375 TX/PRO/DX INJ NEW DRUG ADDON: CPT | Mod: 59

## 2024-02-06 PROCEDURE — 25000003 PHARM REV CODE 250: Performed by: EMERGENCY MEDICINE

## 2024-02-06 PROCEDURE — 99285 EMERGENCY DEPT VISIT HI MDM: CPT | Mod: 25

## 2024-02-06 PROCEDURE — 83690 ASSAY OF LIPASE: CPT | Performed by: EMERGENCY MEDICINE

## 2024-02-06 PROCEDURE — 80053 COMPREHEN METABOLIC PANEL: CPT | Mod: 59 | Performed by: EMERGENCY MEDICINE

## 2024-02-06 PROCEDURE — 82140 ASSAY OF AMMONIA: CPT | Performed by: EMERGENCY MEDICINE

## 2024-02-06 RX ORDER — ONDANSETRON HYDROCHLORIDE 2 MG/ML
4 INJECTION, SOLUTION INTRAVENOUS
Status: COMPLETED | OUTPATIENT
Start: 2024-02-07 | End: 2024-02-06

## 2024-02-06 RX ADMIN — PROMETHAZINE HYDROCHLORIDE 12.5 MG: 25 INJECTION INTRAMUSCULAR; INTRAVENOUS at 09:02

## 2024-02-06 RX ADMIN — ONDANSETRON 4 MG: 2 INJECTION INTRAMUSCULAR; INTRAVENOUS at 11:02

## 2024-02-06 NOTE — Clinical Note
Diagnosis: Abdominal pain [338700]   Future Attending Provider: GENEVA BAÑUELOS [[id number]]   Place in Observation: [facility] [8260]

## 2024-02-07 LAB
LACTATE SERPL-SCNC: 0.6 MMOL/L (ref 0.5–1.9)
OHS QRS DURATION: 84 MS
OHS QTC CALCULATION: 522 MS
TROPONIN I SERPL HS-MCNC: 10 PG/ML (ref 0–14.9)

## 2024-02-07 PROCEDURE — 96375 TX/PRO/DX INJ NEW DRUG ADDON: CPT | Mod: 59

## 2024-02-07 PROCEDURE — G0257 UNSCHED DIALYSIS ESRD PT HOS: HCPCS

## 2024-02-07 PROCEDURE — 83605 ASSAY OF LACTIC ACID: CPT | Performed by: INTERNAL MEDICINE

## 2024-02-07 PROCEDURE — 25000003 PHARM REV CODE 250: Performed by: INTERNAL MEDICINE

## 2024-02-07 PROCEDURE — 63600175 PHARM REV CODE 636 W HCPCS: Performed by: INTERNAL MEDICINE

## 2024-02-07 PROCEDURE — G0378 HOSPITAL OBSERVATION PER HR: HCPCS

## 2024-02-07 PROCEDURE — 96376 TX/PRO/DX INJ SAME DRUG ADON: CPT | Mod: 59

## 2024-02-07 PROCEDURE — 90935 HEMODIALYSIS ONE EVALUATION: CPT

## 2024-02-07 PROCEDURE — 93005 ELECTROCARDIOGRAM TRACING: CPT | Mod: 59 | Performed by: GENERAL PRACTICE

## 2024-02-07 PROCEDURE — 93010 ELECTROCARDIOGRAM REPORT: CPT | Mod: ,,, | Performed by: GENERAL PRACTICE

## 2024-02-07 PROCEDURE — 63600175 PHARM REV CODE 636 W HCPCS: Performed by: EMERGENCY MEDICINE

## 2024-02-07 PROCEDURE — 84484 ASSAY OF TROPONIN QUANT: CPT | Performed by: INTERNAL MEDICINE

## 2024-02-07 RX ORDER — MORPHINE SULFATE 2 MG/ML
2 INJECTION, SOLUTION INTRAMUSCULAR; INTRAVENOUS EVERY 4 HOURS PRN
Status: DISCONTINUED | OUTPATIENT
Start: 2024-02-07 | End: 2024-02-08 | Stop reason: HOSPADM

## 2024-02-07 RX ORDER — SODIUM CHLORIDE 0.9 % (FLUSH) 0.9 %
10 SYRINGE (ML) INJECTION EVERY 12 HOURS
Status: DISCONTINUED | OUTPATIENT
Start: 2024-02-07 | End: 2024-02-07

## 2024-02-07 RX ORDER — HYDRALAZINE HYDROCHLORIDE 20 MG/ML
10 INJECTION INTRAMUSCULAR; INTRAVENOUS
Status: COMPLETED | OUTPATIENT
Start: 2024-02-07 | End: 2024-02-07

## 2024-02-07 RX ORDER — HYDRALAZINE HYDROCHLORIDE 25 MG/1
100 TABLET, FILM COATED ORAL 2 TIMES DAILY
Status: DISCONTINUED | OUTPATIENT
Start: 2024-02-07 | End: 2024-02-08 | Stop reason: HOSPADM

## 2024-02-07 RX ORDER — DROPERIDOL 2.5 MG/ML
1.25 INJECTION, SOLUTION INTRAMUSCULAR; INTRAVENOUS ONCE
Status: COMPLETED | OUTPATIENT
Start: 2024-02-07 | End: 2024-02-07

## 2024-02-07 RX ORDER — PANTOPRAZOLE SODIUM 40 MG/1
40 TABLET, DELAYED RELEASE ORAL
Status: DISCONTINUED | OUTPATIENT
Start: 2024-02-07 | End: 2024-02-08 | Stop reason: HOSPADM

## 2024-02-07 RX ORDER — CARVEDILOL 25 MG/1
25 TABLET ORAL 2 TIMES DAILY
Status: DISCONTINUED | OUTPATIENT
Start: 2024-02-07 | End: 2024-02-08 | Stop reason: HOSPADM

## 2024-02-07 RX ORDER — SEVELAMER CARBONATE 800 MG/1
1600 TABLET, FILM COATED ORAL
Status: DISCONTINUED | OUTPATIENT
Start: 2024-02-07 | End: 2024-02-08 | Stop reason: HOSPADM

## 2024-02-07 RX ORDER — DROPERIDOL 2.5 MG/ML
1.25 INJECTION, SOLUTION INTRAMUSCULAR; INTRAVENOUS ONCE
Status: DISCONTINUED | OUTPATIENT
Start: 2024-02-07 | End: 2024-02-07

## 2024-02-07 RX ORDER — HYDRALAZINE HYDROCHLORIDE 20 MG/ML
10 INJECTION INTRAMUSCULAR; INTRAVENOUS EVERY 6 HOURS PRN
Status: DISCONTINUED | OUTPATIENT
Start: 2024-02-07 | End: 2024-02-08 | Stop reason: HOSPADM

## 2024-02-07 RX ORDER — SUCRALFATE 1 G/10ML
1 SUSPENSION ORAL
Status: DISCONTINUED | OUTPATIENT
Start: 2024-02-07 | End: 2024-02-08 | Stop reason: HOSPADM

## 2024-02-07 RX ORDER — HEPARIN SODIUM 1000 [USP'U]/ML
4000 INJECTION, SOLUTION INTRAVENOUS; SUBCUTANEOUS
Status: DISCONTINUED | OUTPATIENT
Start: 2024-02-07 | End: 2024-02-08 | Stop reason: HOSPADM

## 2024-02-07 RX ORDER — TALC
6 POWDER (GRAM) TOPICAL NIGHTLY PRN
Status: DISCONTINUED | OUTPATIENT
Start: 2024-02-07 | End: 2024-02-08 | Stop reason: HOSPADM

## 2024-02-07 RX ORDER — LEVETIRACETAM 500 MG/1
500 TABLET ORAL 2 TIMES DAILY
Status: DISCONTINUED | OUTPATIENT
Start: 2024-02-07 | End: 2024-02-08 | Stop reason: HOSPADM

## 2024-02-07 RX ORDER — ISOSORBIDE MONONITRATE 30 MG/1
30 TABLET, EXTENDED RELEASE ORAL DAILY
Status: DISCONTINUED | OUTPATIENT
Start: 2024-02-07 | End: 2024-02-08 | Stop reason: HOSPADM

## 2024-02-07 RX ORDER — MUPIROCIN 20 MG/G
OINTMENT TOPICAL 2 TIMES DAILY
Status: DISCONTINUED | OUTPATIENT
Start: 2024-02-07 | End: 2024-02-08 | Stop reason: HOSPADM

## 2024-02-07 RX ORDER — AMLODIPINE BESYLATE 5 MG/1
5 TABLET ORAL DAILY
Status: DISCONTINUED | OUTPATIENT
Start: 2024-02-07 | End: 2024-02-08 | Stop reason: HOSPADM

## 2024-02-07 RX ADMIN — HYDRALAZINE HYDROCHLORIDE 10 MG: 20 INJECTION INTRAMUSCULAR; INTRAVENOUS at 01:02

## 2024-02-07 RX ADMIN — APIXABAN 2.5 MG: 2.5 TABLET, FILM COATED ORAL at 08:02

## 2024-02-07 RX ADMIN — HYDRALAZINE HYDROCHLORIDE 10 MG: 20 INJECTION INTRAMUSCULAR; INTRAVENOUS at 12:02

## 2024-02-07 RX ADMIN — DROPERIDOL 1.25 MG: 2.5 INJECTION, SOLUTION INTRAMUSCULAR; INTRAVENOUS at 01:02

## 2024-02-07 RX ADMIN — LEVETIRACETAM 500 MG: 500 TABLET, FILM COATED ORAL at 08:02

## 2024-02-07 RX ADMIN — HYDRALAZINE HYDROCHLORIDE 100 MG: 25 TABLET ORAL at 08:02

## 2024-02-07 RX ADMIN — SUCRALFATE 1 G: 1 SUSPENSION ORAL at 08:02

## 2024-02-07 RX ADMIN — SUCRALFATE 1 G: 1 SUSPENSION ORAL at 05:02

## 2024-02-07 RX ADMIN — SEVELAMER CARBONATE 1600 MG: 800 TABLET, FILM COATED ORAL at 05:02

## 2024-02-07 RX ADMIN — CARVEDILOL 25 MG: 25 TABLET, FILM COATED ORAL at 08:02

## 2024-02-07 RX ADMIN — MORPHINE SULFATE 2 MG: 2 INJECTION, SOLUTION INTRAMUSCULAR; INTRAVENOUS at 08:02

## 2024-02-07 RX ADMIN — HYDRALAZINE HYDROCHLORIDE 10 MG: 20 INJECTION INTRAMUSCULAR; INTRAVENOUS at 03:02

## 2024-02-07 NOTE — ASSESSMENT & PLAN NOTE
Received p.r.n. hydralazine IV in the ED.    Continue home medications and continue using p.r.n. hydralazine.  If blood pressure continues to be elevated, can consider Cardene drip.  Likely overloaded from missed hemodialysis session.      Latest blood pressure and vitals reviewed-   Temp:  [97.9 °F (36.6 °C)]   Pulse:  [74-78]   Resp:  [12-27]   BP: (156-211)/()   SpO2:  [96 %-100 %] .     Home meds for hypertension were reviewed and noted below.   Hypertension Medications               amLODIPine (NORVASC) 5 MG tablet Take 1 tablet (5 mg total) by mouth once daily.    carvediloL (COREG) 25 MG tablet Take 1 tablet (25 mg total) by mouth 2 (two) times daily.    hydrALAZINE (APRESOLINE) 100 MG tablet Take 1 tablet (100 mg total) by mouth 2 (two) times daily.    isosorbide mononitrate (IMDUR) 30 MG 24 hr tablet Take 1 tablet (30 mg total) by mouth once daily.

## 2024-02-07 NOTE — PLAN OF CARE
Pt asleep at time of assessment.  to re-attempt.       02/07/24 8965   Discharge Assessment   Assessment Type Discharge Planning Assessment

## 2024-02-07 NOTE — SUBJECTIVE & OBJECTIVE
Past Medical History:   Diagnosis Date    ESRD on hemodialysis 2022    Gastritis 2022    EGD was 22    Gastroparesis 2022    has not had Emptying study    Heart failure with preserved ejection fraction 2022    EF 55% on 3/22    History of supraventricular tachycardia     Hyperkalemia 2022    Hypertensive emergency 2022    Sickle cell trait 2022    Type 2 diabetes mellitus        Past Surgical History:   Procedure Laterality Date     SECTION      x 3    COLONOSCOPY      COLONOSCOPY N/A 2022    Procedure: COLONOSCOPY;  Surgeon: Jagdeep Cedeno MD;  Location: [facility];  Service: Endoscopy;  Laterality: N/A;    ENDOSCOPIC ULTRASOUND OF UPPER GASTROINTESTINAL TRACT N/A 2023    Procedure: ULTRASOUND, UPPER GI TRACT, ENDOSCOPIC;  Surgeon: Micky Paredes III, MD;  Location: [facility];  Service: Endoscopy;  Laterality: N/A;    ESOPHAGOGASTRODUODENOSCOPY N/A 10/18/2019    Procedure: ESOPHAGOGASTRODUODENOSCOPY (EGD);  Surgeon: Gianluca Mendez MD;  Location: [facility];  Service: Endoscopy;  Laterality: N/A;    ESOPHAGOGASTRODUODENOSCOPY N/A 2022    Procedure: EGD (ESOPHAGOGASTRODUODENOSCOPY);  Surgeon: Micky Paredes III, MD;  Location: [facility];  Service: Endoscopy;  Laterality: N/A;    ESOPHAGOGASTRODUODENOSCOPY N/A 2022    Procedure: EGD (ESOPHAGOGASTRODUODENOSCOPY);  Surgeon: Marcelo Zhong MD;  Location: [facility] ENDO;  Service: Endoscopy;  Laterality: N/A;    INSERTION, CATHETER, TUNNELED N/A 2023    Procedure: Insertion,catheter,tunneled;  Surgeon: Carlos Thurman Jr., MD;  Location: [facility] OR;  Service: General;  Laterality: N/A;    LAPAROSCOPIC CHOLECYSTECTOMY N/A 2022    Procedure: CHOLECYSTECTOMY, LAPAROSCOPIC;  Surgeon: Grey Perez MD;  Location: [facility] OR;  Service: General;  Laterality: N/A;    PLACEMENT OF DUAL-LUMEN VASCULAR CATHETER Left 2022    Procedure: INSERTION-CATHETER-JOSEPH;  Surgeon: Dionte KATZ  MD Elvia;  Location: [facility] OR;  Service: General;  Laterality: Left;    PLACEMENT OF DUAL-LUMEN VASCULAR CATHETER Right 07/26/2022    Procedure: INSERTION-CATHETER-Hemosplit;  Surgeon: Dionte Gan MD;  Location: [facility] OR;  Service: General;  Laterality: Right;       Review of patient's allergies indicates:   Allergen Reactions    Penicillins Hives       No current facility-administered medications on file prior to encounter.     Current Outpatient Medications on File Prior to Encounter   Medication Sig    acetaZOLAMIDE (DIAMOX) 250 MG tablet Take 1 tablet (250 mg total) by mouth 3 (three) times daily. for 10 days    amLODIPine (NORVASC) 5 MG tablet Take 1 tablet (5 mg total) by mouth once daily. (Patient taking differently: Take 5 mg by mouth once daily.)    apixaban (ELIQUIS) 5 mg Tab Take 1 tablet (5 mg total) by mouth 2 (two) times daily.    blood sugar diagnostic (TRUE METRIX GLUCOSE TEST STRIP) Strp Use 1 strip to check blood glucose three times daily    blood-glucose meter (TRUE METRIX GLUCOSE METER) Misc Use to check blood glucose    carvediloL (COREG) 25 MG tablet Take 1 tablet (25 mg total) by mouth 2 (two) times daily.    cetirizine (ZYRTEC) 10 MG tablet Take 1 tablet (10 mg total) by mouth once daily.    ciprofloxacin HCl (CILOXAN) 0.3 % ophthalmic solution Instill 1 drop into the left eye 4 times a day for 30 days    FLUoxetine 20 MG capsule Take 1 capsule (20 mg total) by mouth once daily.    FLUoxetine 20 MG capsule Take 1 capsule every day by oral route for 90 days.    fluticasone propionate (FLONASE ALLERGY RELIEF) 50 mcg/actuation nasal spray [place] 1 spray every day by intranasal route. (Patient taking differently: 1 spray by Each Nostril route Daily.)    gabapentin (NEURONTIN) 300 MG capsule Take 2 capsules twice a day by oral route for 90 days. (Patient taking differently: Take 600 mg by mouth 2 (two) times daily.)    hydrALAZINE (APRESOLINE) 100 MG tablet Take 1 tablet (100 mg total) by mouth  "2 (two) times daily.    insulin aspart U-100 (NOVOLOG FLEXPEN U-100 INSULIN) 100 unit/mL (3 mL) InPn pen Inject 8 units 3 times a day by subcutaneous route before meals (Patient taking differently: Inject 8 Units into the skin 3 (three) times daily before meals.)    insulin detemir U-100, Levemir, (LEVEMIR FLEXTOUCH U100 INSULIN) 100 unit/mL (3 mL) InPn pen Inject 21 units every day by subcutaneous route in the evening for 90 days. (Patient taking differently: Inject 21 Units into the skin every evening.)    isosorbide mononitrate (IMDUR) 30 MG 24 hr tablet Take 1 tablet (30 mg total) by mouth once daily.    ketorolac 0.5% (ACULAR) 0.5 % Drop Instill 1 drop into the left eye 4 times a day for 30 days    lancets (TRUEPLUS LANCETS) 33 gauge Misc Use 1 to check blood glucose three times daily    lancets 33 gauge Misc Use to test twice daily    levETIRAcetam (KEPPRA) 500 MG Tab Take 1 tablet twice a day by oral route for 30 days. (Patient taking differently: Take 500 mg by mouth 2 (two) times daily.)    ondansetron (ZOFRAN-ODT) 4 MG TbDL Place 1 tablet (4 mg total) under the tongue every 8 (eight) hours.    oxyCODONE-acetaminophen (PERCOCET) 5-325 mg per tablet TAKE 1 TABLET EVERY 6 HOURS AS NEEDED FOP PAIN (Patient taking differently: Take 1 tablet by mouth every 6 (six) hours as needed for Pain.)    oxyCODONE-acetaminophen (PERCOCET) 5-325 mg per tablet Take 1 tablet by mouth every 6 (six) hours as needed for pain. (Patient taking differently: Take 1 tablet by mouth every 6 (six) hours as needed for Pain.)    pantoprazole (PROTONIX) 40 MG tablet Take 1 tablet (40 mg total) by mouth once daily.    pen needle, diabetic 31 gauge x 3/16" Ndle Use as directed to inject insulin 5 times daily    prednisoLONE acetate (PRED FORTE) 1 % DrpS Instill 1 drop into the left eye 4 times a day for 30 days    promethazine (PHENERGAN) 25 MG tablet Take 1 tablet (25 mg total) by mouth every 6 (six) hours as needed for 5 days. (Patient " taking differently: Take 25 mg by mouth every 6 (six) hours as needed for Nausea.)    sevelamer carbonate (RENVELA) 800 mg Tab Take 2 tablets with meals three times a day (Patient taking differently: Take 1,600 mg by mouth 3 (three) times daily with meals.)    sucralfate (CARAFATE) 100 mg/mL suspension Take 10 mL 4 times a day by oral route before meals for 30 days. (Patient taking differently: Take 1 g by mouth 4 (four) times daily with meals and nightly.)    sucroferric oxyhydroxide (VELPHORO) 500 mg Chew Take 1 tablet 3 times a day (Patient taking differently: Take 1 tablet by mouth 3 (three) times daily.)    timolol maleate 0.5% (TIMOPTIC) 0.5 % Drop Instill 1 drop into the left eye 2 times a day for 30 days    [DISCONTINUED] atenoloL (TENORMIN) 50 MG tablet Take 1 tablet (50 mg total) by mouth every other day.    [DISCONTINUED] omeprazole (PRILOSEC) 20 MG capsule Take 2 capsules (40 mg total) by mouth once daily. for 10 days     Family History       Problem Relation (Age of Onset)    Diabetes Mother, Father          Tobacco Use    Smoking status: Never    Smokeless tobacco: Never   Substance and Sexual Activity    Alcohol use: Not Currently    Drug use: No    Sexual activity: Not Currently     Partners: Male     Birth control/protection: I.U.D.     Review of Systems   Constitutional:  Positive for fatigue.   Respiratory:  Negative for shortness of breath.    Cardiovascular:  Positive for chest pain.   Gastrointestinal:  Positive for abdominal pain.     Objective:     Vital Signs (Most Recent):  Temp: 97.9 °F (36.6 °C) (02/06/24 2039)  Pulse: 76 (02/07/24 0200)  Resp: 16 (02/07/24 0200)  BP: (!) 178/99 (02/07/24 0200)  SpO2: 99 % (02/07/24 0200) Vital Signs (24h Range):  Temp:  [97.9 °F (36.6 °C)] 97.9 °F (36.6 °C)  Pulse:  [74-78] 76  Resp:  [12-27] 16  SpO2:  [96 %-100 %] 99 %  BP: (156-211)/() 178/99     Weight: 56.7 kg (125 lb)  Body mass index is 22.86 kg/m².     Physical Exam  Constitutional:        Appearance: She is ill-appearing.      Comments: Moaning in pain eyes closed   Cardiovascular:      Rate and Rhythm: Normal rate and regular rhythm.      Pulses: Normal pulses.      Heart sounds: Normal heart sounds.   Pulmonary:      Effort: Pulmonary effort is normal. No respiratory distress.      Breath sounds: Normal breath sounds. No stridor.   Chest:      Chest wall: No tenderness.   Abdominal:      General: Abdomen is flat. Bowel sounds are normal. There is no distension.      Palpations: Abdomen is soft.      Tenderness: There is no abdominal tenderness. There is no guarding.   Skin:     General: Skin is warm and dry.   Neurological:      General: No focal deficit present.      Mental Status: She is alert.                Significant Labs: All pertinent labs within the past 24 hours have been reviewed.    Significant Imaging: I have reviewed all pertinent imaging results/findings within the past 24 hours.  Imaging Results              CT Abdomen Pelvis  Without Contrast (Final result)  Result time 02/06/24 23:02:23      Final result by Angelo Winston MD (02/06/24 23:02:23)                   Narrative:    EXAM: CT Abdomen and Pelvis without contrast    INDICATION: Abdominal pain    TECHNIQUE: Helical CT of the abdomen and pelvis was obtained from the diaphragm through the ischial tuberosities without contrast. 5 mm axial images were created as were coronal and sagittal reformats.    Dose reduction techniques were used including automated exposure control and/or adjustment of the mA and/or kV according to patient size.    COMPARISON: CT from 10/3/2023, 1/15/2024    The lack of IV contrast significantly decreases the sensitivity of this study for the evaluation of solid abdominal organs, hollow viscera and vascular structures.    FINDINGS:  LUNG BASES: Cardiomegaly with small pericardial effusion. Bilateral groundglass opacities and nodularity, worsened since comparison CT from 1/2024, concerning for  infectious process with early edema not excluded. Index right lower lobe nodular opacity on series 3 image 15 measures 12 x 8 mm.    STOMACH/DUODENUM: No significant finding.    LIVER: No significant abnormality.    BILIARY: Cholecystectomy.    PANCREAS: No significant abnormality.    SPLEEN: No significant abnormality.    ADRENAL GLANDS: No significant abnormality.    KIDNEYS/BLADDER:  No significant abnormality.    VESSELS: Advanced atherosclerotic calcifications involving the intra-abdominal major branch vessels. Nonaneurysmal abdominal aorta.    LYMPH NODES: No enlarged abdominal or pelvic lymph nodes.    OTHER PELVIC: Small volume free pelvic fluid.    GI TRACT: No bowel obstruction. Normal appendix.    ABDOMINAL WALL: Diffuse anasarca. Unchanged left lower quadrant anterior abdominal wall stranding seen around image 168 likely from injection site.    PERITONEUM/MESENTERY: No pneumoperitoneum.    BONES/SPINE: No acute abnormality.      IMPRESSION:  Bibasilar groundglass opacities and nodularity concerning for infectious process versus developing edema.    Small pericardial effusion and diffuse anasarca, likely secondary to cardiac and/or renal disease.    No other acute finding in the abdomen or pelvis.    Cardiomegaly.    Electronically signed by:  Angelo Winston MD  02/06/2024 11:02 PM CST Workstation: [id number]                                     X-Ray Chest AP Portable (Final result)  Result time 02/06/24 23:05:26      Final result by Angelo Winston MD (02/06/24 23:05:26)                   Narrative:    EXAM: Single XR view of the chest  INDICATION: Abdominal pain  COMPARISON: 10/13/2023 radiograph    FINDINGS:  Right IJ Vas-Cath is stable in position.  The cardiomediastinal silhouette is enlarged, stable.  Subtle bilateral patchy opacities, better demonstrated on comparison CT. No significant pleural effusion or visible pneumothorax.    IMPRESSION:  Subtle bilateral patchy opacities concerning for  mild edema and/or infection.    Electronically signed by:  Angelo Winston MD  02/06/2024 11:05 PM CST Workstation: [id number]

## 2024-02-07 NOTE — PLAN OF CARE
Problem: Adult Inpatient Plan of Care  Goal: Plan of Care Review  Outcome: Ongoing, Progressing  Goal: Patient-Specific Goal (Individualized)  Outcome: Ongoing, Progressing  Goal: Absence of Hospital-Acquired Illness or Injury  Outcome: Ongoing, Progressing  Goal: Optimal Comfort and Wellbeing  Outcome: Ongoing, Progressing  Goal: Readiness for Transition of Care  Outcome: Ongoing, Progressing     Problem: Skin Injury Risk Increased  Goal: Skin Health and Integrity  Outcome: Ongoing, Progressing     Problem: Device-Related Complication Risk (Hemodialysis)  Goal: Safe, Effective Therapy Delivery  Outcome: Ongoing, Progressing     Problem: Hemodynamic Instability (Hemodialysis)  Goal: Effective Tissue Perfusion  Outcome: Ongoing, Progressing     Problem: Infection (Hemodialysis)  Goal: Absence of Infection Signs and Symptoms  Outcome: Ongoing, Progressing     Problem: Infection  Goal: Absence of Infection Signs and Symptoms  Outcome: Ongoing, Progressing     Problem: Diabetes Comorbidity  Goal: Blood Glucose Level Within Targeted Range  Outcome: Ongoing, Progressing

## 2024-02-07 NOTE — H&P
[facility] - Emergency Dept  Hospital Medicine  History & Physical    Patient Name: Tabby Howard  MRN: [id number]  Patient Class: OP- Observation  Admission Date: 2/6/2024  Attending Physician: Chago Leslie DO   Primary Care Provider: Melony Kim NP         Patient information was obtained from patient and ER records.     Subjective:     Principal Problem:<principal problem not specified>    Chief Complaint:   Chief Complaint   Patient presents with    Abdominal Pain     Lower quad ABD pain. States she missed her dialysis appointment today. Pt states she always has this chronic ABD pain after she misses dialysis.         HPI: Patient was a 34-year-old female with a history of ESRD on hemodialysis, gastroparesis, SVT, hyperkalemia, hypertensive emergency, type 2 diabetes mellitus who presented to the emergency department for evaluation of abdominal pain.  She was recently discharged from [facility] for similar symptoms.  She was found to have symptomatic anemia.  She was given 1 unit PRBC and underwent dialysis.  She was placed on a Cardene drip for hypertensive urgency then left AMA after dialysis.    She has had multiple hospitalizations in the past couple of months for similar symptoms.  She was transferred over to [facility] in December and received a GI and hematology workup in December.  Sickle cell crisis unlikely as patient has sickle cell trait.  EGD with EUS was performed on 12/16/2023 which was overall normal.  Conservative measures were taken and symptoms resolved.    During my encounter, patient was moaning in pain.  Complaining of central chest pressure that is constant that has been happening for days.  She no longer admits to abdominal pain.  She repeated herself often and did not answer the rest of my questions.    In the ED, blood pressure 156/99, heart rate 74, respirations 18, 98% on room air.    WBCs 6.81, hemoglobin 10.3, platelets 135, sodium 136,  potassium 5.4, bicarb 22, anion gap 17, creatinine 8.4,.  lipase 13  CT abdomen and pelvis shows bibasilar ground-glass opacities and nodularity concerning for infectious process versus developing edema.  Small pericardial effusion and diffuse anasarca.    Past Medical History:   Diagnosis Date    ESRD on hemodialysis 2022    Gastritis 2022    EGD was 22    Gastroparesis 2022    has not had Emptying study    Heart failure with preserved ejection fraction 2022    EF 55% on 3/22    History of supraventricular tachycardia     Hyperkalemia 2022    Hypertensive emergency 2022    Sickle cell trait 2022    Type 2 diabetes mellitus        Past Surgical History:   Procedure Laterality Date     SECTION      x 3    COLONOSCOPY      COLONOSCOPY N/A 2022    Procedure: COLONOSCOPY;  Surgeon: Jagdeep Cedeno MD;  Location: [facility];  Service: Endoscopy;  Laterality: N/A;    ENDOSCOPIC ULTRASOUND OF UPPER GASTROINTESTINAL TRACT N/A 2023    Procedure: ULTRASOUND, UPPER GI TRACT, ENDOSCOPIC;  Surgeon: Micky Paredes III, MD;  Location: [facility];  Service: Endoscopy;  Laterality: N/A;    ESOPHAGOGASTRODUODENOSCOPY N/A 10/18/2019    Procedure: ESOPHAGOGASTRODUODENOSCOPY (EGD);  Surgeon: Gianluca Mendez MD;  Location: [facility];  Service: Endoscopy;  Laterality: N/A;    ESOPHAGOGASTRODUODENOSCOPY N/A 2022    Procedure: EGD (ESOPHAGOGASTRODUODENOSCOPY);  Surgeon: Micky Paredes III, MD;  Location: [facility];  Service: Endoscopy;  Laterality: N/A;    ESOPHAGOGASTRODUODENOSCOPY N/A 2022    Procedure: EGD (ESOPHAGOGASTRODUODENOSCOPY);  Surgeon: Marcelo Zhong MD;  Location: [facility] ENDO;  Service: Endoscopy;  Laterality: N/A;    INSERTION, CATHETER, TUNNELED N/A 2023    Procedure: Insertion,catheter,tunneled;  Surgeon: Carlso Thurman Jr., MD;  Location: [facility];  Service: General;  Laterality: N/A;    LAPAROSCOPIC CHOLECYSTECTOMY N/A 2022     Procedure: CHOLECYSTECTOMY, LAPAROSCOPIC;  Surgeon: Grey Perez MD;  Location: [facility] OR;  Service: General;  Laterality: N/A;    PLACEMENT OF DUAL-LUMEN VASCULAR CATHETER Left 07/12/2022    Procedure: INSERTION-CATHETER-JOSEPH;  Surgeon: Dionte Gan MD;  Location: [facility] OR;  Service: General;  Laterality: Left;    PLACEMENT OF DUAL-LUMEN VASCULAR CATHETER Right 07/26/2022    Procedure: INSERTION-CATHETER-Hemosplit;  Surgeon: Dionte Gan MD;  Location: [facility] OR;  Service: General;  Laterality: Right;       Review of patient's allergies indicates:   Allergen Reactions    Penicillins Hives       No current facility-administered medications on file prior to encounter.     Current Outpatient Medications on File Prior to Encounter   Medication Sig    acetaZOLAMIDE (DIAMOX) 250 MG tablet Take 1 tablet (250 mg total) by mouth 3 (three) times daily. for 10 days    amLODIPine (NORVASC) 5 MG tablet Take 1 tablet (5 mg total) by mouth once daily. (Patient taking differently: Take 5 mg by mouth once daily.)    apixaban (ELIQUIS) 5 mg Tab Take 1 tablet (5 mg total) by mouth 2 (two) times daily.    blood sugar diagnostic (TRUE METRIX GLUCOSE TEST STRIP) Strp Use 1 strip to check blood glucose three times daily    blood-glucose meter (TRUE METRIX GLUCOSE METER) Misc Use to check blood glucose    carvediloL (COREG) 25 MG tablet Take 1 tablet (25 mg total) by mouth 2 (two) times daily.    cetirizine (ZYRTEC) 10 MG tablet Take 1 tablet (10 mg total) by mouth once daily.    ciprofloxacin HCl (CILOXAN) 0.3 % ophthalmic solution Instill 1 drop into the left eye 4 times a day for 30 days    FLUoxetine 20 MG capsule Take 1 capsule (20 mg total) by mouth once daily.    FLUoxetine 20 MG capsule Take 1 capsule every day by oral route for 90 days.    fluticasone propionate (FLONASE ALLERGY RELIEF) 50 mcg/actuation nasal spray [place] 1 spray every day by intranasal route. (Patient taking differently: 1 spray by Each Nostril route  "Daily.)    gabapentin (NEURONTIN) 300 MG capsule Take 2 capsules twice a day by oral route for 90 days. (Patient taking differently: Take 600 mg by mouth 2 (two) times daily.)    hydrALAZINE (APRESOLINE) 100 MG tablet Take 1 tablet (100 mg total) by mouth 2 (two) times daily.    insulin aspart U-100 (NOVOLOG FLEXPEN U-100 INSULIN) 100 unit/mL (3 mL) InPn pen Inject 8 units 3 times a day by subcutaneous route before meals (Patient taking differently: Inject 8 Units into the skin 3 (three) times daily before meals.)    insulin detemir U-100, Levemir, (LEVEMIR FLEXTOUCH U100 INSULIN) 100 unit/mL (3 mL) InPn pen Inject 21 units every day by subcutaneous route in the evening for 90 days. (Patient taking differently: Inject 21 Units into the skin every evening.)    isosorbide mononitrate (IMDUR) 30 MG 24 hr tablet Take 1 tablet (30 mg total) by mouth once daily.    ketorolac 0.5% (ACULAR) 0.5 % Drop Instill 1 drop into the left eye 4 times a day for 30 days    lancets (TRUEPLUS LANCETS) 33 gauge Misc Use 1 to check blood glucose three times daily    lancets 33 gauge Misc Use to test twice daily    levETIRAcetam (KEPPRA) 500 MG Tab Take 1 tablet twice a day by oral route for 30 days. (Patient taking differently: Take 500 mg by mouth 2 (two) times daily.)    ondansetron (ZOFRAN-ODT) 4 MG TbDL Place 1 tablet (4 mg total) under the tongue every 8 (eight) hours.    oxyCODONE-acetaminophen (PERCOCET) 5-325 mg per tablet TAKE 1 TABLET EVERY 6 HOURS AS NEEDED FOP PAIN (Patient taking differently: Take 1 tablet by mouth every 6 (six) hours as needed for Pain.)    oxyCODONE-acetaminophen (PERCOCET) 5-325 mg per tablet Take 1 tablet by mouth every 6 (six) hours as needed for pain. (Patient taking differently: Take 1 tablet by mouth every 6 (six) hours as needed for Pain.)    pantoprazole (PROTONIX) 40 MG tablet Take 1 tablet (40 mg total) by mouth once daily.    pen needle, diabetic 31 gauge x 3/16" Ndle Use as directed to inject " insulin 5 times daily    prednisoLONE acetate (PRED FORTE) 1 % DrpS Instill 1 drop into the left eye 4 times a day for 30 days    promethazine (PHENERGAN) 25 MG tablet Take 1 tablet (25 mg total) by mouth every 6 (six) hours as needed for 5 days. (Patient taking differently: Take 25 mg by mouth every 6 (six) hours as needed for Nausea.)    sevelamer carbonate (RENVELA) 800 mg Tab Take 2 tablets with meals three times a day (Patient taking differently: Take 1,600 mg by mouth 3 (three) times daily with meals.)    sucralfate (CARAFATE) 100 mg/mL suspension Take 10 mL 4 times a day by oral route before meals for 30 days. (Patient taking differently: Take 1 g by mouth 4 (four) times daily with meals and nightly.)    sucroferric oxyhydroxide (VELPHORO) 500 mg Chew Take 1 tablet 3 times a day (Patient taking differently: Take 1 tablet by mouth 3 (three) times daily.)    timolol maleate 0.5% (TIMOPTIC) 0.5 % Drop Instill 1 drop into the left eye 2 times a day for 30 days    [DISCONTINUED] atenoloL (TENORMIN) 50 MG tablet Take 1 tablet (50 mg total) by mouth every other day.    [DISCONTINUED] omeprazole (PRILOSEC) 20 MG capsule Take 2 capsules (40 mg total) by mouth once daily. for 10 days     Family History       Problem Relation (Age of Onset)    Diabetes Mother, Father          Tobacco Use    Smoking status: Never    Smokeless tobacco: Never   Substance and Sexual Activity    Alcohol use: Not Currently    Drug use: No    Sexual activity: Not Currently     Partners: Male     Birth control/protection: I.U.D.     Review of Systems   Constitutional:  Positive for fatigue.   Respiratory:  Negative for shortness of breath.    Cardiovascular:  Positive for chest pain.   Gastrointestinal:  Positive for abdominal pain.     Objective:     Vital Signs (Most Recent):  Temp: 97.9 °F (36.6 °C) (02/06/24 2039)  Pulse: 76 (02/07/24 0200)  Resp: 16 (02/07/24 0200)  BP: (!) 178/99 (02/07/24 0200)  SpO2: 99 % (02/07/24 0200) Vital Signs  (24h Range):  Temp:  [97.9 °F (36.6 °C)] 97.9 °F (36.6 °C)  Pulse:  [74-78] 76  Resp:  [12-27] 16  SpO2:  [96 %-100 %] 99 %  BP: (156-211)/() 178/99     Weight: 56.7 kg (125 lb)  Body mass index is 22.86 kg/m².     Physical Exam  Constitutional:       Appearance: She is ill-appearing.      Comments: Moaning in pain eyes closed   Cardiovascular:      Rate and Rhythm: Normal rate and regular rhythm.      Pulses: Normal pulses.      Heart sounds: Normal heart sounds.   Pulmonary:      Effort: Pulmonary effort is normal. No respiratory distress.      Breath sounds: Normal breath sounds. No stridor.   Chest:      Chest wall: No tenderness.   Abdominal:      General: Abdomen is flat. Bowel sounds are normal. There is no distension.      Palpations: Abdomen is soft.      Tenderness: There is no abdominal tenderness. There is no guarding.   Skin:     General: Skin is warm and dry.   Neurological:      General: No focal deficit present.      Mental Status: She is alert.                Significant Labs: All pertinent labs within the past 24 hours have been reviewed.    Significant Imaging: I have reviewed all pertinent imaging results/findings within the past 24 hours.  Imaging Results              CT Abdomen Pelvis  Without Contrast (Final result)  Result time 02/06/24 23:02:23      Final result by Angelo Winston MD (02/06/24 23:02:23)                   Narrative:    EXAM: CT Abdomen and Pelvis without contrast    INDICATION: Abdominal pain    TECHNIQUE: Helical CT of the abdomen and pelvis was obtained from the diaphragm through the ischial tuberosities without contrast. 5 mm axial images were created as were coronal and sagittal reformats.    Dose reduction techniques were used including automated exposure control and/or adjustment of the mA and/or kV according to patient size.    COMPARISON: CT from 10/3/2023, 1/15/2024    The lack of IV contrast significantly decreases the sensitivity of this study for the  evaluation of solid abdominal organs, hollow viscera and vascular structures.    FINDINGS:  LUNG BASES: Cardiomegaly with small pericardial effusion. Bilateral groundglass opacities and nodularity, worsened since comparison CT from 1/2024, concerning for infectious process with early edema not excluded. Index right lower lobe nodular opacity on series 3 image 15 measures 12 x 8 mm.    STOMACH/DUODENUM: No significant finding.    LIVER: No significant abnormality.    BILIARY: Cholecystectomy.    PANCREAS: No significant abnormality.    SPLEEN: No significant abnormality.    ADRENAL GLANDS: No significant abnormality.    KIDNEYS/BLADDER:  No significant abnormality.    VESSELS: Advanced atherosclerotic calcifications involving the intra-abdominal major branch vessels. Nonaneurysmal abdominal aorta.    LYMPH NODES: No enlarged abdominal or pelvic lymph nodes.    OTHER PELVIC: Small volume free pelvic fluid.    GI TRACT: No bowel obstruction. Normal appendix.    ABDOMINAL WALL: Diffuse anasarca. Unchanged left lower quadrant anterior abdominal wall stranding seen around image 168 likely from injection site.    PERITONEUM/MESENTERY: No pneumoperitoneum.    BONES/SPINE: No acute abnormality.      IMPRESSION:  Bibasilar groundglass opacities and nodularity concerning for infectious process versus developing edema.    Small pericardial effusion and diffuse anasarca, likely secondary to cardiac and/or renal disease.    No other acute finding in the abdomen or pelvis.    Cardiomegaly.    Electronically signed by:  Angelo Winston MD  02/06/2024 11:02 PM CST Workstation: [id number]                                     X-Ray Chest AP Portable (Final result)  Result time 02/06/24 23:05:26      Final result by Angelo Winston MD (02/06/24 23:05:26)                   Narrative:    EXAM: Single XR view of the chest  INDICATION: Abdominal pain  COMPARISON: 10/13/2023 radiograph    FINDINGS:  Right IJ Vas-Cath is stable in  position.  The cardiomediastinal silhouette is enlarged, stable.  Subtle bilateral patchy opacities, better demonstrated on comparison CT. No significant pleural effusion or visible pneumothorax.    IMPRESSION:  Subtle bilateral patchy opacities concerning for mild edema and/or infection.    Electronically signed by:  Angelo Winston MD  02/06/2024 11:05 PM CST Workstation: [id number]                                    Assessment/Plan:     Frequent hospital admissions  Frequent hospitalizations for similar issues.      Chest pain  Patient complained of abdominal pain to ED doctor.  Currently planning of chest pain.    Obtain an EKG, troponin, and lactic acid.  On telemetry      ESRD (end stage renal disease)  Missed hemodialysis on Tuesday.    Consulting Nephrology.        Hypertensive emergency  Received p.r.n. hydralazine IV in the ED.    Continue home medications and continue using p.r.n. hydralazine.  If blood pressure continues to be elevated, can consider Cardene drip.  Likely overloaded from missed hemodialysis session.      Latest blood pressure and vitals reviewed-   Temp:  [97.9 °F (36.6 °C)]   Pulse:  [74-78]   Resp:  [12-27]   BP: (156-211)/()   SpO2:  [96 %-100 %] .     Home meds for hypertension were reviewed and noted below.   Hypertension Medications               amLODIPine (NORVASC) 5 MG tablet Take 1 tablet (5 mg total) by mouth once daily.    carvediloL (COREG) 25 MG tablet Take 1 tablet (25 mg total) by mouth 2 (two) times daily.    hydrALAZINE (APRESOLINE) 100 MG tablet Take 1 tablet (100 mg total) by mouth 2 (two) times daily.    isosorbide mononitrate (IMDUR) 30 MG 24 hr tablet Take 1 tablet (30 mg total) by mouth once daily.            Sickle cell trait  Previously worked up by Hematology Oncology.  Not likely sickle cell crisis        VTE Risk Mitigation (From admission, onward)           Ordered     apixaban tablet 5 mg  2 times daily         02/07/24 0252     IP VTE HIGH RISK  PATIENT  Once         02/07/24 0252     Place sequential compression device  Until discontinued         02/07/24 0252                       On 02/07/2024, patient should be placed in hospital observation services under my care.             Chago Leslie DO  Department of Hospital Medicine  [facility] - Emergency Dept

## 2024-02-07 NOTE — ASSESSMENT & PLAN NOTE
Patient complained of abdominal pain to ED doctor.  Currently planning of chest pain.    Obtain an EKG, troponin, and lactic acid.  On telemetry

## 2024-02-07 NOTE — PROGRESS NOTES
3000 ml net uf removed   02/07/24 1145   Required for all Hemodialysis Patients   Hepatitis Status negative   Handoff Report   Received From Dahlia   Given To Adriana   Treatment Type   Treatment Type Maintenance   Vital Signs   Temp 97.9 °F (36.6 °C)   Pulse 80   Resp 18   SpO2 96 %   /82   Assessments (Pre/Post)   Date Hepatitis Profile Obtained 12/14/23   Blood Liters Processed (BLP) 58.4   Transport Modality stretcher   Level of Consciousness (AVPU) alert   Dialyzer Clearance mildly streaked   Pain   Preferred Pain Scale number (Numeric Rating Pain Scale)   Pain Rating (0-10): Rest 0        Hemodialysis Catheter 06/17/23 1135 right internal jugular   Placement Date/Time: 06/17/23 1135   Present Prior to Hospital Arrival?: No  Hand Hygiene: Performed  Barrier Precautions: Performed  Skin Antisepsis: ChloraPrep  Hemodialysis Catheter Type: Tunneled catheter  Location: right internal jugular  Cathete...   Line Necessity Review CRRT/HD   Site Assessment No drainage;No redness;No swelling;No warmth   Line Securement Device Secured with sutures   Dressing Type CHG impregnated dressing/sponge   Dressing Status Clean;Dry;Intact   Dressing Intervention Integrity maintained   Date on Dressing 02/07/24   Dressing Due to be Changed 02/12/24   Venous Patency/Care flushed w/o difficulty;normal saline locked;deaccessed   Arterial Patency/Care flushed w/o difficulty;normal saline locked;deaccessed   Post-Hemodialysis Assessment   Rinseback Volume (mL) 250 mL   Blood Volume Processed (Liters) 58.4 L   Dialyzer Clearance Lightly streaked   Duration of Treatment 180 minutes   Additional Fluid Intake (mL) 500 mL   Total UF (mL) 3500 mL   Net Fluid Removal 3000   Patient Response to Treatment tolerated well   Post-Treatment Weight 53.7 kg (118 lb 6.2 oz)   Treatment Weight Change -3   Post-Hemodialysis Comments tx completed     Educated on tx compliance

## 2024-02-07 NOTE — CONSULTS
INPATIENT NEPHROLOGY CONSULT   [facility] NEPHROLOGY    Tabby Howard  02/07/2024    Reason for consultation:    esrd    Chief Complaint:   Chief Complaint   Patient presents with    Abdominal Pain     Lower quad ABD pain. States she missed her dialysis appointment today. Pt states she always has this chronic ABD pain after she misses dialysis.           History of Present Illness:    Per H and P   Patient was a 34-year-old female with a history of ESRD on hemodialysis, gastroparesis, SVT, hyperkalemia, hypertensive emergency, type 2 diabetes mellitus who presented to the emergency department for evaluation of abdominal pain.  She was recently discharged from [facility] for similar symptoms.  She was found to have symptomatic anemia.  She was given 1 unit PRBC and underwent dialysis.  She was placed on a Cardene drip for hypertensive urgency then left AMA after dialysis.     She has had multiple hospitalizations in the past couple of months for similar symptoms.  She was transferred over to [facility] in December and received a GI and hematology workup in December.  Sickle cell crisis unlikely as patient has sickle cell trait.  EGD with EUS was performed on 12/16/2023 which was overall normal.  Conservative measures were taken and symptoms resolved.     During my encounter, patient was moaning in pain.  Complaining of central chest pressure that is constant that has been happening for days.  She no longer admits to abdominal pain.  She repeated herself often and did not answer the rest of my questions.        Plan of Care:       Assessment:    esrd  --continue dialysis per routine via tunneled catheter  --fluid restrict  --renal dose medication per routine  --continue outpt medication  --continue binders with meals    Anemia  --hold erythropoiesis stimulating agent  give hg level and bp level    Hyperphosphatemia  --renal diet  --continue binders    Hypertension  --uf with hd  --fluid restrict  --low  salt diet  --continue home medication    Hyperkalemia  --2 k bath  --low k diet    Noncompliance with dialysis  --risk of death secondary to this has been d/w the patient on numerous occasions    This is her 20th admission in the last 12 months.  Perhaps some discretion about admissions in the future is called for.           Thank you for allowing us to participate in this patient's care. We will continue to follow.    Vital Signs:  Temp Readings from Last 3 Encounters:   24 97.9 °F (36.6 °C) (Oral)   24 97.4 °F (36.3 °C) (Oral)   24 98 °F (36.7 °C) (Oral)       Pulse Readings from Last 3 Encounters:   24 76   24 84   24 80       BP Readings from Last 3 Encounters:   24 (!) 188/112   24 (!) 189/90   24 (!) 174/83       Weight:  Wt Readings from Last 3 Encounters:   24 56.7 kg (125 lb)   24 54 kg (119 lb)   24 54 kg (119 lb)       Past Medical & Surgical History:  Past Medical History:   Diagnosis Date    ESRD on hemodialysis 2022    Gastritis 2022    EGD was 22    Gastroparesis 2022    has not had Emptying study    Heart failure with preserved ejection fraction 2022    EF 55% on 3/22    History of supraventricular tachycardia     Hyperkalemia 2022    Hypertensive emergency 2022    Sickle cell trait 2022    Type 2 diabetes mellitus        Past Surgical History:   Procedure Laterality Date     SECTION      x 3    COLONOSCOPY      COLONOSCOPY N/A 2022    Procedure: COLONOSCOPY;  Surgeon: Jagdeep Cedeno MD;  Location: [facility] ENDO;  Service: Endoscopy;  Laterality: N/A;    ENDOSCOPIC ULTRASOUND OF UPPER GASTROINTESTINAL TRACT N/A 2023    Procedure: ULTRASOUND, UPPER GI TRACT, ENDOSCOPIC;  Surgeon: Micky Paredes III, MD;  Location: [facility];  Service: Endoscopy;  Laterality: N/A;    ESOPHAGOGASTRODUODENOSCOPY N/A 10/18/2019    Procedure: ESOPHAGOGASTRODUODENOSCOPY (EGD);  Surgeon:  Gianluca Mendez MD;  Location: [facility] ENDO;  Service: Endoscopy;  Laterality: N/A;    ESOPHAGOGASTRODUODENOSCOPY N/A 08/24/2022    Procedure: EGD (ESOPHAGOGASTRODUODENOSCOPY);  Surgeon: Micky Paredes III, MD;  Location: [facility] ENDO;  Service: Endoscopy;  Laterality: N/A;    ESOPHAGOGASTRODUODENOSCOPY N/A 12/5/2022    Procedure: EGD (ESOPHAGOGASTRODUODENOSCOPY);  Surgeon: Marcelo Zhong MD;  Location: [facility] ENDO;  Service: Endoscopy;  Laterality: N/A;    INSERTION, CATHETER, TUNNELED N/A 6/17/2023    Procedure: Insertion,catheter,tunneled;  Surgeon: Carlos Thurman Jr., MD;  Location: [facility] OR;  Service: General;  Laterality: N/A;    LAPAROSCOPIC CHOLECYSTECTOMY N/A 07/30/2022    Procedure: CHOLECYSTECTOMY, LAPAROSCOPIC;  Surgeon: rGey Perez MD;  Location: [facility] OR;  Service: General;  Laterality: N/A;    PLACEMENT OF DUAL-LUMEN VASCULAR CATHETER Left 07/12/2022    Procedure: INSERTION-CATHETER-JOSEPH;  Surgeon: Dionte Gan MD;  Location: [facility] OR;  Service: General;  Laterality: Left;    PLACEMENT OF DUAL-LUMEN VASCULAR CATHETER Right 07/26/2022    Procedure: INSERTION-CATHETER-Hemosplit;  Surgeon: Dionte Gan MD;  Location: [facility] OR;  Service: General;  Laterality: Right;       Past Social History:  Social History     Socioeconomic History    Marital status:    Tobacco Use    Smoking status: Never    Smokeless tobacco: Never   Substance and Sexual Activity    Alcohol use: Not Currently    Drug use: No    Sexual activity: Not Currently     Partners: Male     Birth control/protection: I.U.D.     Social Determinants of Health     Financial Resource Strain: Low Risk  (5/16/2023)    Overall Financial Resource Strain (CARDIA)     Difficulty of Paying Living Expenses: Not very hard   Food Insecurity: No Food Insecurity (5/16/2023)    Hunger Vital Sign     Worried About Running Out of Food in the Last Year: Never true     Ran Out of Food in the Last Year: Never true   Transportation Needs: No  Transportation Needs (5/16/2023)    PRAPARE - Transportation     Lack of Transportation (Medical): No     Lack of Transportation (Non-Medical): No   Physical Activity: Inactive (5/16/2023)    Exercise Vital Sign     Days of Exercise per Week: 0 days     Minutes of Exercise per Session: 0 min   Stress: No Stress Concern Present (5/16/2023)    [employer] [place] [employer] - Occupational Stress Questionnaire     Feeling of Stress : Not at all   Social Connections: Unknown (5/16/2023)    Social Connection and Isolation Panel [NHANES]     Frequency of Communication with Friends and Family: More than three times a week     Frequency of Social Gatherings with Friends and Family: More than three times a week     Attends Restorationist Services: Never     Active Member of Clubs or Organizations: No     Attends Club or Organization Meetings: Never     Marital Status: Patient declined   Housing Stability: Low Risk  (5/16/2023)    Housing Stability Vital Sign     Unable to Pay for Housing in the Last Year: No     Number of Places Lived in the Last Year: 1     Unstable Housing in the Last Year: No       Medications:  No current facility-administered medications on file prior to encounter.     Current Outpatient Medications on File Prior to Encounter   Medication Sig Dispense Refill    acetaZOLAMIDE (DIAMOX) 250 MG tablet Take 1 tablet (250 mg total) by mouth 3 (three) times daily. for 10 days 30 tablet 0    amLODIPine (NORVASC) 5 MG tablet Take 1 tablet (5 mg total) by mouth once daily. (Patient taking differently: Take 5 mg by mouth once daily.) 30 tablet 1    apixaban (ELIQUIS) 5 mg Tab Take 1 tablet (5 mg total) by mouth 2 (two) times daily. 180 tablet 1    blood sugar diagnostic (TRUE METRIX GLUCOSE TEST STRIP) Strp Use 1 strip to check blood glucose three times daily 100 each 11    blood-glucose meter (TRUE METRIX GLUCOSE METER) Misc Use to check blood glucose 1 each 0    carvediloL (COREG) 25 MG tablet Take 1 tablet  (25 mg total) by mouth 2 (two) times daily. 60 tablet 5    cetirizine (ZYRTEC) 10 MG tablet Take 1 tablet (10 mg total) by mouth once daily. 30 tablet 0    ciprofloxacin HCl (CILOXAN) 0.3 % ophthalmic solution Instill 1 drop into the left eye 4 times a day for 30 days 10 mL 0    FLUoxetine 20 MG capsule Take 1 capsule (20 mg total) by mouth once daily. 30 capsule 5    FLUoxetine 20 MG capsule Take 1 capsule every day by oral route for 90 days. 90 capsule 1    fluticasone propionate (FLONASE ALLERGY RELIEF) 50 mcg/actuation nasal spray [place] 1 spray every day by intranasal route. (Patient taking differently: 1 spray by Each Nostril route Daily.) 16 g 2    gabapentin (NEURONTIN) 300 MG capsule Take 2 capsules twice a day by oral route for 90 days. (Patient taking differently: Take 600 mg by mouth 2 (two) times daily.) 360 capsule 1    hydrALAZINE (APRESOLINE) 100 MG tablet Take 1 tablet (100 mg total) by mouth 2 (two) times daily. 180 tablet 1    insulin aspart U-100 (NOVOLOG FLEXPEN U-100 INSULIN) 100 unit/mL (3 mL) InPn pen Inject 8 units 3 times a day by subcutaneous route before meals (Patient taking differently: Inject 8 Units into the skin 3 (three) times daily before meals.) 24 mL 1    insulin detemir U-100, Levemir, (LEVEMIR FLEXTOUCH U100 INSULIN) 100 unit/mL (3 mL) InPn pen Inject 21 units every day by subcutaneous route in the evening for 90 days. (Patient taking differently: Inject 21 Units into the skin every evening.) 15 mL 1    isosorbide mononitrate (IMDUR) 30 MG 24 hr tablet Take 1 tablet (30 mg total) by mouth once daily. 90 tablet 1    ketorolac 0.5% (ACULAR) 0.5 % Drop Instill 1 drop into the left eye 4 times a day for 30 days 3 mL 5    lancets (TRUEPLUS LANCETS) 33 gauge Misc Use 1 to check blood glucose three times daily 100 each 11    lancets 33 gauge Misc Use to test twice daily 100 each 5    levETIRAcetam (KEPPRA) 500 MG Tab Take 1 tablet twice a day by oral route for 30 days. (Patient taking  "differently: Take 500 mg by mouth 2 (two) times daily.) 60 tablet 2    ondansetron (ZOFRAN-ODT) 4 MG TbDL Place 1 tablet (4 mg total) under the tongue every 8 (eight) hours. 24 tablet 2    oxyCODONE-acetaminophen (PERCOCET) 5-325 mg per tablet TAKE 1 TABLET EVERY 6 HOURS AS NEEDED FOP PAIN (Patient taking differently: Take 1 tablet by mouth every 6 (six) hours as needed for Pain.) 15 tablet 0    oxyCODONE-acetaminophen (PERCOCET) 5-325 mg per tablet Take 1 tablet by mouth every 6 (six) hours as needed for pain. (Patient taking differently: Take 1 tablet by mouth every 6 (six) hours as needed for Pain.) 15 tablet 0    pantoprazole (PROTONIX) 40 MG tablet Take 1 tablet (40 mg total) by mouth once daily. 90 tablet 1    pen needle, diabetic 31 gauge x 3/16" Ndle Use as directed to inject insulin 5 times daily 100 each 11    prednisoLONE acetate (PRED FORTE) 1 % DrpS Instill 1 drop into the left eye 4 times a day for 30 days 5 mL 1    promethazine (PHENERGAN) 25 MG tablet Take 1 tablet (25 mg total) by mouth every 6 (six) hours as needed for 5 days. (Patient taking differently: Take 25 mg by mouth every 6 (six) hours as needed for Nausea.) 20 tablet 2    sevelamer carbonate (RENVELA) 800 mg Tab Take 2 tablets with meals three times a day (Patient taking differently: Take 1,600 mg by mouth 3 (three) times daily with meals.) 180 tablet 11    sucralfate (CARAFATE) 100 mg/mL suspension Take 10 mL 4 times a day by oral route before meals for 30 days. (Patient taking differently: Take 1 g by mouth 4 (four) times daily with meals and nightly.) 1200 mL 1    sucroferric oxyhydroxide (VELPHORO) 500 mg Chew Take 1 tablet 3 times a day (Patient taking differently: Take 1 tablet by mouth 3 (three) times daily.) 90 tablet 6    timolol maleate 0.5% (TIMOPTIC) 0.5 % Drop Instill 1 drop into the left eye 2 times a day for 30 days 5 mL 6    [DISCONTINUED] atenoloL (TENORMIN) 50 MG tablet Take 1 tablet (50 mg total) by mouth every other " "day. 45 tablet 3    [DISCONTINUED] omeprazole (PRILOSEC) 20 MG capsule Take 2 capsules (40 mg total) by mouth once daily. for 10 days 20 capsule 0     Scheduled Meds:   amLODIPine  5 mg Oral Daily    apixaban  2.5 mg Oral BID    carvediloL  25 mg Oral BID    hydrALAZINE  100 mg Oral BID    isosorbide mononitrate  30 mg Oral Daily    levETIRAcetam  500 mg Oral BID    pantoprazole  40 mg Oral Before breakfast    sevelamer carbonate  1,600 mg Oral TID WM    sucralfate  1 g Oral QID (WM & HS)     Continuous Infusions:  PRN Meds:.hydrALAZINE, melatonin, morphine    Allergies:  Penicillins    Past Family History:  Reviewed; refer to Hospitalist Admission Note    Review of Systems:  Review of Systems - All 14 systems reviewed and negative, except as noted in HPI    Physical Exam:    BP (!) 188/112   Pulse 76   Temp 97.9 °F (36.6 °C) (Oral)   Resp 17   Ht 5' 2" (1.575 m)   Wt 56.7 kg (125 lb)   SpO2 96%   BMI 22.86 kg/m²     General Appearance:    Tearful, moaning   Head:    Normocephalic, without obvious abnormality, atraumatic   Eyes:    PER, conjunctiva/corneas clear, EOM's intact in both eyes        Throat:   Lips, mucosa, and tongue normal; teeth and gums normal   Back:     Symmetric, no curvature, ROM normal, no CVA tenderness   Lungs:     Clear to auscultation bilaterally, respirations unlabored   Chest wall:    No tenderness or deformity   Heart:    Regular rate and rhythm, S1 and S2 normal, no murmur, rub   or gallop   Abdomen:     Tender.     Extremities:   Extremities normal, atraumatic, no cyanosis or edema   Pulses:   2+ and symmetric all extremities   MSK:   No joint or muscle swelling, tenderness or deformity   Skin:   Skin color, texture, turgor normal, no rashes or lesions   Neurologic:   CNII-XII intact, normal strength and sensation       Throughout.  No flap     Results:  Lab Results   Component Value Date     02/06/2024    K 5.4 (H) 02/06/2024    CL 97 02/06/2024    CO2 22 (L) 02/06/2024    " BUN 41 (H) 02/06/2024    CREATININE 8.4 (H) 02/06/2024    CALCIUM 8.7 02/06/2024    ANIONGAP 17 (H) 02/06/2024    ESTGFRAFRICA 19 (L) 09/03/2022    EGFRNONAA 18 (A) 07/31/2022       Lab Results   Component Value Date    CALCIUM 8.7 02/06/2024    PHOS 4.0 01/25/2024       Recent Labs   Lab 02/06/24 2118   WBC 6.81   RBC 3.42*   HGB 10.3*   HCT 30.9*   *   MCV 90   MCH 30.1   MCHC 33.3          I have personally reviewed pertinent radiological imaging and reports.    Imaging Results              CT Head Without Contrast (Final result)  Result time 02/07/24 03:10:06      Final result by Varun Nicholson MD (02/07/24 03:10:06)                   Narrative:    EXAM: CT head  without  contrast    COMPARISON:  CT head from October 3, 2023    HISTORY: Altered mental status    TECHNIQUE: Helical axial CT of the head was performed without  contrast. Coronal and sagittal reconstructions were performed. All CT scans at this facility use dose modulation, iterative reconstruction, and/or weight based dosing when appropriate to reduce radiation dose to as low as reasonably achievable.  861  mGy-cm DLP.    FINDINGS:  The examination is stable intracranially.  There is no acute intracranial abnormality.   There is no hemorrhage, mass, midline shift, abnormal extra-axial fluid collection, hydrocephalus or evolving ischemia.  The gray-white matter junction is well maintained.  There is no evidence of intravascular clot.   Brain parenchyma, ventricles and sulci are normal.    There are no acute calvarial lesions. There are new postsurgical changes identified involving the left lobe. The mastoid air cells demonstrate no significant  soft tissue opacification. The visualized paranasal sinuses are clear.  -----------------------------------------------------  IMPRESSION:  1. There is no acute intracranial abnormality. There is no evolving ischemia or hemorrhage or  mass.  -----------------------------------------------------    Electronically signed by:  Varun Nicholson MD  02/07/2024 03:10 AM CST Workstation: [id number]                                     CT Abdomen Pelvis  Without Contrast (Final result)  Result time 02/06/24 23:02:23      Final result by Angelo Winston MD (02/06/24 23:02:23)                   Narrative:    EXAM: CT Abdomen and Pelvis without contrast    INDICATION: Abdominal pain    TECHNIQUE: Helical CT of the abdomen and pelvis was obtained from the diaphragm through the ischial tuberosities without contrast. 5 mm axial images were created as were coronal and sagittal reformats.    Dose reduction techniques were used including automated exposure control and/or adjustment of the mA and/or kV according to patient size.    COMPARISON: CT from 10/3/2023, 1/15/2024    The lack of IV contrast significantly decreases the sensitivity of this study for the evaluation of solid abdominal organs, hollow viscera and vascular structures.    FINDINGS:  LUNG BASES: Cardiomegaly with small pericardial effusion. Bilateral groundglass opacities and nodularity, worsened since comparison CT from 1/2024, concerning for infectious process with early edema not excluded. Index right lower lobe nodular opacity on series 3 image 15 measures 12 x 8 mm.    STOMACH/DUODENUM: No significant finding.    LIVER: No significant abnormality.    BILIARY: Cholecystectomy.    PANCREAS: No significant abnormality.    SPLEEN: No significant abnormality.    ADRENAL GLANDS: No significant abnormality.    KIDNEYS/BLADDER:  No significant abnormality.    VESSELS: Advanced atherosclerotic calcifications involving the intra-abdominal major branch vessels. Nonaneurysmal abdominal aorta.    LYMPH NODES: No enlarged abdominal or pelvic lymph nodes.    OTHER PELVIC: Small volume free pelvic fluid.    GI TRACT: No bowel obstruction. Normal appendix.    ABDOMINAL WALL: Diffuse anasarca. Unchanged  left lower quadrant anterior abdominal wall stranding seen around image 168 likely from injection site.    PERITONEUM/MESENTERY: No pneumoperitoneum.    BONES/SPINE: No acute abnormality.      IMPRESSION:  Bibasilar groundglass opacities and nodularity concerning for infectious process versus developing edema.    Small pericardial effusion and diffuse anasarca, likely secondary to cardiac and/or renal disease.    No other acute finding in the abdomen or pelvis.    Cardiomegaly.    Electronically signed by:  Angelo Winston MD  02/06/2024 11:02 PM CST Workstation: [id number]                                     X-Ray Chest AP Portable (Final result)  Result time 02/06/24 23:05:26      Final result by Angelo Winston MD (02/06/24 23:05:26)                   Narrative:    EXAM: Single XR view of the chest  INDICATION: Abdominal pain  COMPARISON: 10/13/2023 radiograph    FINDINGS:  Right IJ Vas-Cath is stable in position.  The cardiomediastinal silhouette is enlarged, stable.  Subtle bilateral patchy opacities, better demonstrated on comparison CT. No significant pleural effusion or visible pneumothorax.    IMPRESSION:  Subtle bilateral patchy opacities concerning for mild edema and/or infection.    Electronically signed by:  Angelo Winston MD  02/06/2024 11:05 PM CST Workstation: [id number]                                    Patient care was time spent personally by me on the following activities:   Obtaining a history  Examination of patient.  Providing medical care at the patients bedside.  Developing a treatment plan with patient or surrogate and bedside caregivers  Ordering and reviewing laboratory studies, radiographic studies, pulse oximetry.  Ordering and performing treatments and interventions.  Evaluation of patient's response to treatment.  Discussions with consultants while on the unit and immediately available to the patient.  Re-evaluation of the patient's condition.  Documentation in the medical  record.     Hugh Figueroa MD  Nephrology  [facility] [place]  [phone]

## 2024-02-07 NOTE — PROGRESS NOTES
"Pharmacist Renal Dose Adjustment Note    Tabby Howard is a 34 y.o. female being treated with the medication Apixaban    Patient Data:    Vital Signs (Most Recent):  Temp: 97.9 °F (36.6 °C) (02/06/24 2039)  Pulse: 76 (02/07/24 0200)  Resp: 16 (02/07/24 0200)  BP: (!) 178/99 (02/07/24 0200)  SpO2: 99 % (02/07/24 0200) Vital Signs (72h Range):  Temp:  [97.9 °F (36.6 °C)]   Pulse:  [74-78]   Resp:  [12-27]   BP: (156-211)/()   SpO2:  [96 %-100 %]        Ht: 5' 2" (1.575 m)  Wt: 56.7 kg (125 lb)  Estimated Creatinine Clearance: 7.4 mL/min (A) (based on SCr of 8.4 mg/dL (H)).  Body mass index is 22.86 kg/m².    Per [facility] renal dosing protocol:     Previous Order: Apixaban 5 mg BiD    Will be changed to:     New Order: Apixaban 2.5 mg BiD,    Due to: Per Pharmacy Protocol    Renal dose adjustments performed as noted above.    We will continue monitoring and adjusting as necessary.    Pharmacist: Miah Maloney, PharmD  Ext: [phone]      "

## 2024-02-07 NOTE — ED PROVIDER NOTES
Encounter Date: 2024       History     Chief Complaint   Patient presents with    Abdominal Pain     Lower quad ABD pain. States she missed her dialysis appointment today. Pt states she always has this chronic ABD pain after she misses dialysis.      Chief complaint is abdominal pain.  The patient gets dialysis on  and Saturday.  She missed dialysis today and now has abdominal pain.  Per EMS she has had increased abdominal pains recently etiology uncertain.  Upon review of her chart she has had gastroparesis and has been admitted for this before.  She has end-stage renal disease gastroparesis.  On arrival she is moaning and will not answer any questions but will state her name.        Review of patient's allergies indicates:   Allergen Reactions    Penicillins Hives     Past Medical History:   Diagnosis Date    ESRD on hemodialysis 2022    Gastritis 2022    EGD was 22    Gastroparesis 2022    has not had Emptying study    Heart failure with preserved ejection fraction 2022    EF 55% on 3/22    History of supraventricular tachycardia     Hyperkalemia 2022    Hypertensive emergency 2022    Sickle cell trait 2022    Type 2 diabetes mellitus      Past Surgical History:   Procedure Laterality Date     SECTION      x 3    COLONOSCOPY      COLONOSCOPY N/A 2022    Procedure: COLONOSCOPY;  Surgeon: Jagdeep Cedeno MD;  Location: [facility];  Service: Endoscopy;  Laterality: N/A;    ENDOSCOPIC ULTRASOUND OF UPPER GASTROINTESTINAL TRACT N/A 2023    Procedure: ULTRASOUND, UPPER GI TRACT, ENDOSCOPIC;  Surgeon: Micky Paredes III, MD;  Location: [facility];  Service: Endoscopy;  Laterality: N/A;    ESOPHAGOGASTRODUODENOSCOPY N/A 10/18/2019    Procedure: ESOPHAGOGASTRODUODENOSCOPY (EGD);  Surgeon: Gianluca Mendez MD;  Location: [facility];  Service: Endoscopy;  Laterality: N/A;    ESOPHAGOGASTRODUODENOSCOPY N/A 2022    Procedure: EGD  (ESOPHAGOGASTRODUODENOSCOPY);  Surgeon: Micky Paredes III, MD;  Location: [facility] ENDO;  Service: Endoscopy;  Laterality: N/A;    ESOPHAGOGASTRODUODENOSCOPY N/A 12/5/2022    Procedure: EGD (ESOPHAGOGASTRODUODENOSCOPY);  Surgeon: Marcelo Zhong MD;  Location: [facility] ENDO;  Service: Endoscopy;  Laterality: N/A;    INSERTION, CATHETER, TUNNELED N/A 6/17/2023    Procedure: Insertion,catheter,tunneled;  Surgeon: Carlos Thurman Jr., MD;  Location: [facility] OR;  Service: General;  Laterality: N/A;    LAPAROSCOPIC CHOLECYSTECTOMY N/A 07/30/2022    Procedure: CHOLECYSTECTOMY, LAPAROSCOPIC;  Surgeon: Grey Perez MD;  Location: [facility] OR;  Service: General;  Laterality: N/A;    PLACEMENT OF DUAL-LUMEN VASCULAR CATHETER Left 07/12/2022    Procedure: INSERTION-CATHETER-JOSEPH;  Surgeon: Dionte Gan MD;  Location: [facility] OR;  Service: General;  Laterality: Left;    PLACEMENT OF DUAL-LUMEN VASCULAR CATHETER Right 07/26/2022    Procedure: INSERTION-CATHETER-Hemosplit;  Surgeon: Dionte Gan MD;  Location: [facility] OR;  Service: General;  Laterality: Right;     Family History   Problem Relation Age of Onset    Diabetes Mother     Diabetes Father      Social History     Tobacco Use    Smoking status: Never    Smokeless tobacco: Never   Substance Use Topics    Alcohol use: Not Currently    Drug use: No     Review of Systems   Constitutional:  Negative for chills and fever.   HENT:  Negative for ear pain, rhinorrhea and sore throat.    Eyes:  Negative for pain and visual disturbance.   Respiratory:  Negative for cough and shortness of breath.    Cardiovascular:  Negative for chest pain and palpitations.   Gastrointestinal:  Positive for abdominal pain. Negative for constipation, diarrhea, nausea and vomiting.   Genitourinary:  Negative for dysuria, frequency, hematuria and urgency.   Musculoskeletal:  Negative for back pain, joint swelling and myalgias.   Skin:  Negative for rash.   Neurological:  Negative for dizziness,  seizures, weakness and headaches.   Psychiatric/Behavioral:  Negative for dysphoric mood. The patient is not nervous/anxious.        Physical Exam     Initial Vitals [02/06/24 2039]   BP Pulse Resp Temp SpO2   (!) 156/99 74 18 97.9 °F (36.6 °C) 98 %      MAP       --         Physical Exam    Nursing note and vitals reviewed.  Constitutional: She appears well-developed and well-nourished.   HENT:   Head: Normocephalic and atraumatic.   Eyes: Conjunctivae, EOM and lids are normal. Pupils are equal, round, and reactive to light.   Neck: Trachea normal. Neck supple. No thyroid mass and no thyromegaly present.   Normal range of motion.  Cardiovascular:  Normal rate, regular rhythm and normal heart sounds.           Pulmonary/Chest: Effort normal and breath sounds normal.   Abdominal: Abdomen is soft. There is abdominal tenderness.   Musculoskeletal:         General: Normal range of motion.      Cervical back: Normal range of motion and neck supple.     Neurological: She is alert and oriented to person, place, and time. She has normal strength and normal reflexes. No cranial nerve deficit or sensory deficit.   Skin: Skin is warm and dry.   Psychiatric: She has a normal mood and affect. Her speech is normal and behavior is normal. Judgment and thought content normal.         ED Course   Procedures  Labs Reviewed   CBC W/ AUTO DIFFERENTIAL - Abnormal; Notable for the following components:       Result Value    RBC 3.42 (*)     Hemoglobin 10.3 (*)     Hematocrit 30.9 (*)     RDW 18.2 (*)     Platelets 135 (*)     Immature Granulocytes 0.6 (*)     Lymph # 0.5 (*)     Mono # 0.2 (*)     Gran % 84.8 (*)     Lymph % 7.5 (*)     Mono % 2.9 (*)     All other components within normal limits   COMPREHENSIVE METABOLIC PANEL - Abnormal; Notable for the following components:    Potassium 6.1 (*)     Glucose 297 (*)     BUN 41 (*)     Creatinine 8.5 (*)     Total Protein 8.7 (*)     Total Bilirubin 1.3 (*)     Alkaline Phosphatase 148  (*)     eGFR 5.8 (*)     Anion Gap 18 (*)     All other components within normal limits   COMPREHENSIVE METABOLIC PANEL - Abnormal; Notable for the following components:    Potassium 5.4 (*)     CO2 22 (*)     Glucose 307 (*)     BUN 41 (*)     Creatinine 8.4 (*)     Total Bilirubin 1.2 (*)     eGFR 5.9 (*)     Anion Gap 17 (*)     All other components within normal limits   LIPASE   AMMONIA   URINALYSIS, REFLEX TO URINE CULTURE   TROPONIN I HIGH SENSITIVITY   LACTIC ACID, PLASMA     EKG Readings: (Independently Interpreted)   EKG shows normal sinus rhythm ventricular rate 77 no signs of ST elevation.       Imaging Results              CT Head Without Contrast (Final result)  Result time 02/07/24 03:10:06      Final result by Varun Nicholson MD (02/07/24 03:10:06)                   Narrative:    EXAM: CT head  without  contrast    COMPARISON:  CT head from October 3, 2023    HISTORY: Altered mental status    TECHNIQUE: Helical axial CT of the head was performed without  contrast. Coronal and sagittal reconstructions were performed. All CT scans at this facility use dose modulation, iterative reconstruction, and/or weight based dosing when appropriate to reduce radiation dose to as low as reasonably achievable.  861  mGy-cm DLP.    FINDINGS:  The examination is stable intracranially.  There is no acute intracranial abnormality.   There is no hemorrhage, mass, midline shift, abnormal extra-axial fluid collection, hydrocephalus or evolving ischemia.  The gray-white matter junction is well maintained.  There is no evidence of intravascular clot.   Brain parenchyma, ventricles and sulci are normal.    There are no acute calvarial lesions. There are new postsurgical changes identified involving the left lobe. The mastoid air cells demonstrate no significant  soft tissue opacification. The visualized paranasal sinuses are clear.  -----------------------------------------------------  IMPRESSION:  1. There is no acute  intracranial abnormality. There is no evolving ischemia or hemorrhage or mass.  -----------------------------------------------------    Electronically signed by:  Varun Nicholson MD  02/07/2024 03:10 AM CST Workstation: [id number]                                     CT Abdomen Pelvis  Without Contrast (Final result)  Result time 02/06/24 23:02:23      Final result by Angelo Winston MD (02/06/24 23:02:23)                   Narrative:    EXAM: CT Abdomen and Pelvis without contrast    INDICATION: Abdominal pain    TECHNIQUE: Helical CT of the abdomen and pelvis was obtained from the diaphragm through the ischial tuberosities without contrast. 5 mm axial images were created as were coronal and sagittal reformats.    Dose reduction techniques were used including automated exposure control and/or adjustment of the mA and/or kV according to patient size.    COMPARISON: CT from 10/3/2023, 1/15/2024    The lack of IV contrast significantly decreases the sensitivity of this study for the evaluation of solid abdominal organs, hollow viscera and vascular structures.    FINDINGS:  LUNG BASES: Cardiomegaly with small pericardial effusion. Bilateral groundglass opacities and nodularity, worsened since comparison CT from 1/2024, concerning for infectious process with early edema not excluded. Index right lower lobe nodular opacity on series 3 image 15 measures 12 x 8 mm.    STOMACH/DUODENUM: No significant finding.    LIVER: No significant abnormality.    BILIARY: Cholecystectomy.    PANCREAS: No significant abnormality.    SPLEEN: No significant abnormality.    ADRENAL GLANDS: No significant abnormality.    KIDNEYS/BLADDER:  No significant abnormality.    VESSELS: Advanced atherosclerotic calcifications involving the intra-abdominal major branch vessels. Nonaneurysmal abdominal aorta.    LYMPH NODES: No enlarged abdominal or pelvic lymph nodes.    OTHER PELVIC: Small volume free pelvic fluid.    GI TRACT: No bowel  obstruction. Normal appendix.    ABDOMINAL WALL: Diffuse anasarca. Unchanged left lower quadrant anterior abdominal wall stranding seen around image 168 likely from injection site.    PERITONEUM/MESENTERY: No pneumoperitoneum.    BONES/SPINE: No acute abnormality.      IMPRESSION:  Bibasilar groundglass opacities and nodularity concerning for infectious process versus developing edema.    Small pericardial effusion and diffuse anasarca, likely secondary to cardiac and/or renal disease.    No other acute finding in the abdomen or pelvis.    Cardiomegaly.    Electronically signed by:  Angelo Winston MD  02/06/2024 11:02 PM CST Workstation: [id number]                                     X-Ray Chest AP Portable (Final result)  Result time 02/06/24 23:05:26      Final result by Angelo Winston MD (02/06/24 23:05:26)                   Narrative:    EXAM: Single XR view of the chest  INDICATION: Abdominal pain  COMPARISON: 10/13/2023 radiograph    FINDINGS:  Right IJ Vas-Cath is stable in position.  The cardiomediastinal silhouette is enlarged, stable.  Subtle bilateral patchy opacities, better demonstrated on comparison CT. No significant pleural effusion or visible pneumothorax.    IMPRESSION:  Subtle bilateral patchy opacities concerning for mild edema and/or infection.    Electronically signed by:  Angelo Winston MD  02/06/2024 11:05 PM CST Workstation: [id number]                                     Medications   amLODIPine tablet 5 mg (has no administration in time range)   carvediloL tablet 25 mg (has no administration in time range)   hydrALAZINE tablet 100 mg (has no administration in time range)   isosorbide mononitrate 24 hr tablet 30 mg (has no administration in time range)   levETIRAcetam tablet 500 mg (has no administration in time range)   pantoprazole EC tablet 40 mg (has no administration in time range)   sevelamer carbonate tablet 1,600 mg (has no administration in time range)   sucralfate 100  "mg/mL suspension 1 g (has no administration in time range)   sodium chloride 0.9% flush 10 mL (has no administration in time range)   melatonin tablet 6 mg (has no administration in time range)   morphine injection 2 mg (has no administration in time range)   hydrALAZINE injection 10 mg (has no administration in time range)   apixaban tablet 2.5 mg (has no administration in time range)   promethazine (PHENERGAN) 12.5 mg in dextrose 5 % (D5W) 50 mL IVPB (0 mg Intravenous Stopped 2/6/24 2205)   ondansetron injection 4 mg (4 mg Intravenous Given 2/6/24 2350)   hydrALAZINE injection 10 mg (10 mg Intravenous Given 2/7/24 0008)   droPERidol injection 1.25 mg (1.25 mg Intravenous Given 2/7/24 0156)   hydrALAZINE injection 10 mg (10 mg Intravenous Given 2/7/24 0159)     Medical Decision Making  Patient is here for abdominal pain and she missed her dialysis.  Workup here shows no gross abnormalities but she still will be admitted for dialysis which she missed.  She can be minimally to make sure she has no more abdominal pain.  She does have a history of gastroparesis.    In the ER after getting Phenergan she calmed down for the CT scan.  She is able answer some questions thereafter.  She then started screaming out for a nurse and when we went into the room she just continued screaming out that she needed a nurse.  She kept repeating and screaming at the top of her voice "help me" Will give Inapsine to try to calm her down at this point and then get her admitted for evaluation and admission for dialysis.    When the nurse went back to work with her she actually quit yelling and screaming and was talking to her saying she had diffuse abdominal pain.  She will be given Inapsine to calm her down in the hospitalist was told of her presentation and will come see her.  We ordered a CT of the head for completeness as well.    Amount and/or Complexity of Data Reviewed  Labs: ordered.  Radiology: ordered.    Risk  Prescription drug " management.                                      Clinical Impression:  Final diagnoses:  [R10.9] Abdominal pain  [R52] Generalized pain          ED Disposition Condition    Observation                 Ruiz James MD  02/07/24 0042       Ruiz James MD  02/07/24 0328       Ruiz James MD  02/07/24 0341       Ruiz James MD  02/20/24 0357

## 2024-02-07 NOTE — HPI
Patient was a 34-year-old female with a history of ESRD on hemodialysis, gastroparesis, SVT, hyperkalemia, hypertensive emergency, type 2 diabetes mellitus who presented to the emergency department for evaluation of abdominal pain.  She was recently discharged from [facility] for similar symptoms.  She was found to have symptomatic anemia.  She was given 1 unit PRBC and underwent dialysis.  She was placed on a Cardene drip for hypertensive urgency then left AMA after dialysis.    She has had multiple hospitalizations in the past couple of months for similar symptoms.  She was transferred over to [facility] in December and received a GI and hematology workup in December.  Sickle cell crisis unlikely as patient has sickle cell trait.  EGD with EUS was performed on 12/16/2023 which was overall normal.  Conservative measures were taken and symptoms resolved.    During my encounter, patient was moaning in pain.  Complaining of central chest pressure that is constant that has been happening for days.  She no longer admits to abdominal pain.  She repeated herself often and did not answer the rest of my questions.    In the ED, blood pressure 156/99, heart rate 74, respirations 18, 98% on room air.    WBCs 6.81, hemoglobin 10.3, platelets 135, sodium 136, potassium 5.4, bicarb 22, anion gap 17, creatinine 8.4,.  lipase 13  CT abdomen and pelvis shows bibasilar ground-glass opacities and nodularity concerning for infectious process versus developing edema.  Small pericardial effusion and diffuse anasarca.

## 2024-02-08 VITALS
SYSTOLIC BLOOD PRESSURE: 148 MMHG | OXYGEN SATURATION: 99 % | HEIGHT: 62 IN | HEART RATE: 85 BPM | TEMPERATURE: 99 F | WEIGHT: 125 LBS | DIASTOLIC BLOOD PRESSURE: 98 MMHG | BODY MASS INDEX: 23 KG/M2 | RESPIRATION RATE: 18 BRPM

## 2024-02-08 LAB
ALBUMIN SERPL BCP-MCNC: 3.5 G/DL (ref 3.5–5.2)
ALP SERPL-CCNC: 108 U/L (ref 55–135)
ALT SERPL W/O P-5'-P-CCNC: 5 U/L (ref 10–44)
ANION GAP SERPL CALC-SCNC: 10 MMOL/L (ref 8–16)
AST SERPL-CCNC: 11 U/L (ref 10–40)
BASOPHILS # BLD AUTO: 0.02 K/UL (ref 0–0.2)
BASOPHILS NFR BLD: 0.6 % (ref 0–1.9)
BILIRUB SERPL-MCNC: 0.9 MG/DL (ref 0.1–1)
BUN SERPL-MCNC: 29 MG/DL (ref 6–20)
CALCIUM SERPL-MCNC: 8.1 MG/DL (ref 8.7–10.5)
CHLORIDE SERPL-SCNC: 102 MMOL/L (ref 95–110)
CO2 SERPL-SCNC: 24 MMOL/L (ref 23–29)
CREAT SERPL-MCNC: 6.7 MG/DL (ref 0.5–1.4)
DIFFERENTIAL METHOD BLD: ABNORMAL
EOSINOPHIL # BLD AUTO: 0.1 K/UL (ref 0–0.5)
EOSINOPHIL NFR BLD: 3.2 % (ref 0–8)
ERYTHROCYTE [DISTWIDTH] IN BLOOD BY AUTOMATED COUNT: 18.6 % (ref 11.5–14.5)
EST. GFR  (NO RACE VARIABLE): 7.7 ML/MIN/1.73 M^2
GLUCOSE SERPL-MCNC: 295 MG/DL (ref 70–110)
HCT VFR BLD AUTO: 24.9 % (ref 37–48.5)
HGB BLD-MCNC: 8.3 G/DL (ref 12–16)
IMM GRANULOCYTES # BLD AUTO: 0.01 K/UL (ref 0–0.04)
IMM GRANULOCYTES NFR BLD AUTO: 0.3 % (ref 0–0.5)
LYMPHOCYTES # BLD AUTO: 1 K/UL (ref 1–4.8)
LYMPHOCYTES NFR BLD: 29.2 % (ref 18–48)
MCH RBC QN AUTO: 30.5 PG (ref 27–31)
MCHC RBC AUTO-ENTMCNC: 33.3 G/DL (ref 32–36)
MCV RBC AUTO: 92 FL (ref 82–98)
MONOCYTES # BLD AUTO: 0.3 K/UL (ref 0.3–1)
MONOCYTES NFR BLD: 7.4 % (ref 4–15)
NEUTROPHILS # BLD AUTO: 2.1 K/UL (ref 1.8–7.7)
NEUTROPHILS NFR BLD: 59.3 % (ref 38–73)
NRBC BLD-RTO: 0 /100 WBC
PHOSPHATE SERPL-MCNC: 4.6 MG/DL (ref 2.7–4.5)
PLATELET # BLD AUTO: 98 K/UL (ref 150–450)
PMV BLD AUTO: 10.6 FL (ref 9.2–12.9)
POTASSIUM SERPL-SCNC: 4.9 MMOL/L (ref 3.5–5.1)
PROCALCITONIN SERPL IA-MCNC: 0.54 NG/ML (ref 0–0.5)
PROT SERPL-MCNC: 6.1 G/DL (ref 6–8.4)
RBC # BLD AUTO: 2.72 M/UL (ref 4–5.4)
SODIUM SERPL-SCNC: 136 MMOL/L (ref 136–145)
WBC # BLD AUTO: 3.49 K/UL (ref 3.9–12.7)

## 2024-02-08 PROCEDURE — 25000003 PHARM REV CODE 250: Performed by: NURSE PRACTITIONER

## 2024-02-08 PROCEDURE — 84100 ASSAY OF PHOSPHORUS: CPT | Performed by: NURSE PRACTITIONER

## 2024-02-08 PROCEDURE — 25000003 PHARM REV CODE 250: Performed by: INTERNAL MEDICINE

## 2024-02-08 PROCEDURE — 90935 HEMODIALYSIS ONE EVALUATION: CPT

## 2024-02-08 PROCEDURE — 80053 COMPREHEN METABOLIC PANEL: CPT | Mod: 59 | Performed by: NURSE PRACTITIONER

## 2024-02-08 PROCEDURE — 36415 COLL VENOUS BLD VENIPUNCTURE: CPT | Performed by: INTERNAL MEDICINE

## 2024-02-08 PROCEDURE — 84145 PROCALCITONIN (PCT): CPT | Performed by: INTERNAL MEDICINE

## 2024-02-08 PROCEDURE — 96372 THER/PROPH/DIAG INJ SC/IM: CPT | Mod: 59 | Performed by: NURSE PRACTITIONER

## 2024-02-08 PROCEDURE — 36415 COLL VENOUS BLD VENIPUNCTURE: CPT | Performed by: NURSE PRACTITIONER

## 2024-02-08 PROCEDURE — 96374 THER/PROPH/DIAG INJ IV PUSH: CPT | Mod: 59

## 2024-02-08 PROCEDURE — G0378 HOSPITAL OBSERVATION PER HR: HCPCS

## 2024-02-08 PROCEDURE — 63600175 PHARM REV CODE 636 W HCPCS: Performed by: INTERNAL MEDICINE

## 2024-02-08 PROCEDURE — 85025 COMPLETE CBC W/AUTO DIFF WBC: CPT | Performed by: NURSE PRACTITIONER

## 2024-02-08 PROCEDURE — 63600175 PHARM REV CODE 636 W HCPCS: Mod: JZ,JG | Performed by: NURSE PRACTITIONER

## 2024-02-08 PROCEDURE — G0257 UNSCHED DIALYSIS ESRD PT HOS: HCPCS

## 2024-02-08 RX ORDER — HEPARIN SODIUM 5000 [USP'U]/ML
5000 INJECTION, SOLUTION INTRAVENOUS; SUBCUTANEOUS
Status: DISCONTINUED | OUTPATIENT
Start: 2024-02-08 | End: 2024-02-08 | Stop reason: HOSPADM

## 2024-02-08 RX ORDER — SODIUM CHLORIDE 9 MG/ML
INJECTION, SOLUTION INTRAVENOUS ONCE
Status: DISCONTINUED | OUTPATIENT
Start: 2024-02-08 | End: 2024-02-08

## 2024-02-08 RX ORDER — MOXIFLOXACIN 5 MG/ML
1 SOLUTION/ DROPS OPHTHALMIC 3 TIMES DAILY
Status: DISCONTINUED | OUTPATIENT
Start: 2024-02-08 | End: 2024-02-08 | Stop reason: HOSPADM

## 2024-02-08 RX ORDER — HEPARIN SODIUM 1000 [USP'U]/ML
5000 INJECTION, SOLUTION INTRAVENOUS; SUBCUTANEOUS
Status: DISCONTINUED | OUTPATIENT
Start: 2024-02-08 | End: 2024-02-08 | Stop reason: HOSPADM

## 2024-02-08 RX ORDER — SODIUM CHLORIDE 9 MG/ML
INJECTION, SOLUTION INTRAVENOUS
Status: DISCONTINUED | OUTPATIENT
Start: 2024-02-08 | End: 2024-02-08 | Stop reason: HOSPADM

## 2024-02-08 RX ADMIN — MUPIROCIN 1 G: 20 OINTMENT TOPICAL at 09:02

## 2024-02-08 RX ADMIN — CARVEDILOL 25 MG: 25 TABLET, FILM COATED ORAL at 09:02

## 2024-02-08 RX ADMIN — PANTOPRAZOLE SODIUM 40 MG: 40 TABLET, DELAYED RELEASE ORAL at 06:02

## 2024-02-08 RX ADMIN — SEVELAMER CARBONATE 1600 MG: 800 TABLET, FILM COATED ORAL at 09:02

## 2024-02-08 RX ADMIN — HYDRALAZINE HYDROCHLORIDE 100 MG: 25 TABLET ORAL at 09:02

## 2024-02-08 RX ADMIN — SUCRALFATE 1 G: 1 SUSPENSION ORAL at 09:02

## 2024-02-08 RX ADMIN — ISOSORBIDE MONONITRATE 30 MG: 30 TABLET, EXTENDED RELEASE ORAL at 09:02

## 2024-02-08 RX ADMIN — HEPARIN SODIUM 5000 UNITS: 1000 INJECTION, SOLUTION INTRAVENOUS; SUBCUTANEOUS at 02:02

## 2024-02-08 RX ADMIN — EPOETIN ALFA-EPBX 2800 UNITS: 10000 INJECTION, SOLUTION INTRAVENOUS; SUBCUTANEOUS at 02:02

## 2024-02-08 RX ADMIN — LEVETIRACETAM 500 MG: 500 TABLET, FILM COATED ORAL at 09:02

## 2024-02-08 RX ADMIN — APIXABAN 2.5 MG: 2.5 TABLET, FILM COATED ORAL at 09:02

## 2024-02-08 RX ADMIN — AMLODIPINE BESYLATE 5 MG: 5 TABLET ORAL at 09:02

## 2024-02-08 NOTE — PLAN OF CARE
[facility]  Initial Discharge Assessment       Primary Care Provider: Melony Kim NP    Admission Diagnosis: Abdominal pain [R10.9]    Admission Date: 2/6/2024  Expected Discharge Date: 2/8/2024     / Social work intern completed discharge assessment with Pt at bedside. Pt AAOx4s. Pt lives at home with MIL and dependent children. Demographics, PCP, and insurance verified. No home health. Pt does dialysis with [facility] on TTS schedule. Pt completes ADLs with walker and glucometer. Pt to discharge home via family transport. Pt has no other needs to be addressed at this time.     Transition of Care Barriers: None    Payor: MEDICAID / Plan: HE[place] BL[place] ([email or web address] [place] / Product Type: Managed Medicaid /     Extended Emergency Contact Information  Primary Emergency Contact: Tanya Howard  Address: [address] [place], [place] [address] [facility]  Home Phone: [phone]  Mobile Phone: [phone]  Relation: Step parent  Preferred language: English   needed? No  Secondary Emergency Contact: Garcia Howard  Address: [address] [place] [address], [facility] [place]  Mobile Phone: [phone]  Relation: Father  Preferred language: English   needed? No    Discharge Plan A: Home with family         [facility] [address]  [facility] [address]  Phone: [phone] Fax: [phone]      Initial Assessment (most recent)       Adult Discharge Assessment - 02/08/24 1151          Discharge Assessment    Assessment Type Discharge Planning Assessment     Confirmed/corrected address, phone number and insurance Yes     Confirmed Demographics Correct on Facesheet     Source of Information patient     Does patient/caregiver understand observation status Yes     Communicated TATIANA with patient/caregiver Yes     Reason For Admission Abdominal Pain R10.9     People in Home other relative(s)     Facility Arrived From: Home     Do you expect to  return to your current living situation? Yes     Do you have help at home or someone to help you manage your care at home? Yes     Who are your caregiver(s) and their phone number(s)? Tanya Howard (Step parent) [phone]     Prior to hospitilization cognitive status: Alert/Oriented     Current cognitive status: Alert/Oriented     Walking or Climbing Stairs Difficulty no     Dressing/Bathing Difficulty no     Equipment Currently Used at Home walker, rolling;glucometer     Readmission within 30 days? No     Patient currently being followed by outpatient case management? No     Do you currently have service(s) that help you manage your care at home? No     Do you take prescription medications? Yes     Do you have prescription coverage? Yes     Coverage Payor: MEDICAID - [employer] ([employer] [place]) -     Do you have any problems affording any of your prescribed medications? No     Is the patient taking medications as prescribed? yes     Who is going to help you get home at discharge? Tanya Howard (Step parent) [phone] (Mobile)     How do you get to doctors appointments? family or friend will provide     Are you on dialysis? Yes     Dialysis Name and Scheduled days [facility]     Do you take coumadin? No     Discharge Plan A Home with family     Discharge Plan discussed with: Patient     Transition of Care Barriers None

## 2024-02-08 NOTE — NURSING
Patient returned to room via bed in stable condition.  Awaiting family to pick her up for discharge.

## 2024-02-08 NOTE — PROGRESS NOTES
02/08/24 1535   Vital Signs   Temp 98.7 °F (37.1 °C)   Pulse 85   Resp 18   SpO2 99 %   BP (!) 148/98   Post-Hemodialysis Assessment   Rinseback Volume (mL) 250 mL   Blood Volume Processed (Liters) 67.1 L   Dialyzer Clearance Lightly streaked   Duration of Treatment 180 minutes   Total UF (mL) 4500 mL   Net Fluid Removal 4000   Patient Response to Treatment tolerated well   Post-Treatment Weight 52.1 kg (114 lb 13.8 oz)   Treatment Weight Change -4.4   Post-Hemodialysis Comments no problems     Pt educated on importance of tx adherence. Report called to Brielle LINTON

## 2024-02-08 NOTE — PROGRESS NOTES
INPATIENT NEPHROLOGY progress  [facility] NEPHROLOGY    Tabby Howard  02/08/2024    Reason for consultation:    esrd    Chief Complaint:   Chief Complaint   Patient presents with    Abdominal Pain     Lower quad ABD pain. States she missed her dialysis appointment today. Pt states she always has this chronic ABD pain after she misses dialysis.           History of Present Illness:    Per H and P   Patient was a 34-year-old female with a history of ESRD on hemodialysis, gastroparesis, SVT, hyperkalemia, hypertensive emergency, type 2 diabetes mellitus who presented to the emergency department for evaluation of abdominal pain.  She was recently discharged from [facility] for similar symptoms.  She was found to have symptomatic anemia.  She was given 1 unit PRBC and underwent dialysis.  She was placed on a Cardene drip for hypertensive urgency then left AMA after dialysis.     She has had multiple hospitalizations in the past couple of months for similar symptoms.  She was transferred over to [facility] in December and received a GI and hematology workup in December.  Sickle cell crisis unlikely as patient has sickle cell trait.  EGD with EUS was performed on 12/16/2023 which was overall normal.  Conservative measures were taken and symptoms resolved.     During my encounter, patient was moaning in pain.  Complaining of central chest pressure that is constant that has been happening for days.  She no longer admits to abdominal pain.  She repeated herself often and did not answer the rest of my questions.    2/8  lab results reviewed, drop in hgb.  Will order epo w/HD now that pressures are better controlled.  Pt without complaints today.        Plan of Care:       Assessment:    esrd  --continue dialysis per routine via tunneled catheter  --fluid restrict  --renal dose medication per routine  --continue outpt medication  --continue binders with meals    Anemia  --hold erythropoiesis stimulating agent   give hg level and bp level    Hyperphosphatemia  --renal diet  --continue binders    Hypertension  --uf with hd  --fluid restrict  --low salt diet  --continue home medication    Hyperkalemia  --2 k bath  --low k diet    Noncompliance with dialysis  --risk of death secondary to this has been d/w the patient on numerous occasions    This is her 20th admission in the last 12 months.  Perhaps some discretion about admissions in the future is called for.           Thank you for allowing us to participate in this patient's care. We will continue to follow.    Vital Signs:  Temp Readings from Last 3 Encounters:   24 99.1 °F (37.3 °C) (Oral)   24 97.4 °F (36.3 °C) (Oral)   24 98 °F (36.7 °C) (Oral)       Pulse Readings from Last 3 Encounters:   24 84   24 84   24 80       BP Readings from Last 3 Encounters:   24 (!) 146/81   24 (!) 189/90   24 (!) 174/83       Weight:  Wt Readings from Last 3 Encounters:   24 56.7 kg (125 lb)   24 54 kg (119 lb)   24 54 kg (119 lb)       Past Medical & Surgical History:  Past Medical History:   Diagnosis Date    ESRD on hemodialysis 2022    Gastritis 2022    EGD was 22    Gastroparesis 2022    has not had Emptying study    Heart failure with preserved ejection fraction 2022    EF 55% on 3/22    History of supraventricular tachycardia     Hyperkalemia 2022    Hypertensive emergency 2022    Sickle cell trait 2022    Type 2 diabetes mellitus        Past Surgical History:   Procedure Laterality Date     SECTION      x 3    COLONOSCOPY      COLONOSCOPY N/A 2022    Procedure: COLONOSCOPY;  Surgeon: Jagdeep Cedeno MD;  Location: [facility];  Service: Endoscopy;  Laterality: N/A;    ENDOSCOPIC ULTRASOUND OF UPPER GASTROINTESTINAL TRACT N/A 2023    Procedure: ULTRASOUND, UPPER GI TRACT, ENDOSCOPIC;  Surgeon: Micky Paredes III, MD;  Location: [facility];   Service: Endoscopy;  Laterality: N/A;    ESOPHAGOGASTRODUODENOSCOPY N/A 10/18/2019    Procedure: ESOPHAGOGASTRODUODENOSCOPY (EGD);  Surgeon: Gianluca Mendez MD;  Location: [facility];  Service: Endoscopy;  Laterality: N/A;    ESOPHAGOGASTRODUODENOSCOPY N/A 08/24/2022    Procedure: EGD (ESOPHAGOGASTRODUODENOSCOPY);  Surgeon: Micky Paredes III, MD;  Location: [facility];  Service: Endoscopy;  Laterality: N/A;    ESOPHAGOGASTRODUODENOSCOPY N/A 12/5/2022    Procedure: EGD (ESOPHAGOGASTRODUODENOSCOPY);  Surgeon: Marcelo Zhong MD;  Location: [facility];  Service: Endoscopy;  Laterality: N/A;    INSERTION, CATHETER, TUNNELED N/A 6/17/2023    Procedure: Insertion,catheter,tunneled;  Surgeon: Carlos Thurman Jr., MD;  Location: [facility];  Service: General;  Laterality: N/A;    LAPAROSCOPIC CHOLECYSTECTOMY N/A 07/30/2022    Procedure: CHOLECYSTECTOMY, LAPAROSCOPIC;  Surgeon: Grey Perez MD;  Location: [facility] OR;  Service: General;  Laterality: N/A;    PLACEMENT OF DUAL-LUMEN VASCULAR CATHETER Left 07/12/2022    Procedure: INSERTION-CATHETER-JOSEPH;  Surgeon: Dionte Gan MD;  Location: [facility];  Service: General;  Laterality: Left;    PLACEMENT OF DUAL-LUMEN VASCULAR CATHETER Right 07/26/2022    Procedure: INSERTION-CATHETER-Hemosplit;  Surgeon: Dionte Gan MD;  Location: [facility];  Service: General;  Laterality: Right;       Past Social History:  Social History     Socioeconomic History    Marital status:    Tobacco Use    Smoking status: Never    Smokeless tobacco: Never   Substance and Sexual Activity    Alcohol use: Not Currently    Drug use: No    Sexual activity: Not Currently     Partners: Male     Birth control/protection: I.U.D.     Social Determinants of Health     Financial Resource Strain: Low Risk  (5/16/2023)    Overall Financial Resource Strain (CARDIA)     Difficulty of Paying Living Expenses: Not very hard   Food Insecurity: No Food Insecurity (5/16/2023)    Hunger Vital Sign      Worried About Running Out of Food in the Last Year: Never true     Ran Out of Food in the Last Year: Never true   Transportation Needs: No Transportation Needs (5/16/2023)    PRAPARE - Transportation     Lack of Transportation (Medical): No     Lack of Transportation (Non-Medical): No   Physical Activity: Inactive (5/16/2023)    Exercise Vital Sign     Days of Exercise per Week: 0 days     Minutes of Exercise per Session: 0 min   Stress: No Stress Concern Present (5/16/2023)    [employer] - Occupational Stress Questionnaire     Feeling of Stress : Not at all   Social Connections: Unknown (5/16/2023)    Social Connection and Isolation Panel [NHANES]     Frequency of Communication with Friends and Family: More than three times a week     Frequency of Social Gatherings with Friends and Family: More than three times a week     Attends Restorationism Services: Never     Active Member of Clubs or Organizations: No     Attends Club or Organization Meetings: Never     Marital Status: Patient declined   Housing Stability: Low Risk  (5/16/2023)    Housing Stability Vital Sign     Unable to Pay for Housing in the Last Year: No     Number of Places Lived in the Last Year: 1     Unstable Housing in the Last Year: No       Medications:  No current facility-administered medications on file prior to encounter.     Current Outpatient Medications on File Prior to Encounter   Medication Sig Dispense Refill    acetaZOLAMIDE (DIAMOX) 250 MG tablet Take 1 tablet (250 mg total) by mouth 3 (three) times daily. for 10 days 30 tablet 0    amLODIPine (NORVASC) 5 MG tablet Take 1 tablet (5 mg total) by mouth once daily. (Patient taking differently: Take 5 mg by mouth once daily.) 30 tablet 1    apixaban (ELIQUIS) 5 mg Tab Take 1 tablet (5 mg total) by mouth 2 (two) times daily. 180 tablet 1    blood sugar diagnostic (TRUE METRIX GLUCOSE TEST STRIP) Strp Use 1 strip to check blood glucose three times daily 100 each 11     blood-glucose meter (TRUE METRIX GLUCOSE METER) Misc Use to check blood glucose 1 each 0    carvediloL (COREG) 25 MG tablet Take 1 tablet (25 mg total) by mouth 2 (two) times daily. 60 tablet 5    cetirizine (ZYRTEC) 10 MG tablet Take 1 tablet (10 mg total) by mouth once daily. 30 tablet 0    ciprofloxacin HCl (CILOXAN) 0.3 % ophthalmic solution Instill 1 drop into the left eye 4 times a day for 30 days (Patient taking differently: Place 1 drop into both eyes 4 (four) times daily.) 10 mL 0    FLUoxetine 20 MG capsule Take 1 capsule every day by oral route for 90 days. (Patient taking differently: Take 20 mg by mouth once daily.) 90 capsule 1    fluticasone propionate (FLONASE ALLERGY RELIEF) 50 mcg/actuation nasal spray [place] 1 spray every day by intranasal route. (Patient taking differently: 1 spray by Each Nostril route Daily.) 16 g 2    gabapentin (NEURONTIN) 300 MG capsule Take 2 capsules twice a day by oral route for 90 days. (Patient taking differently: Take 600 mg by mouth 2 (two) times daily.) 360 capsule 1    hydrALAZINE (APRESOLINE) 100 MG tablet Take 1 tablet (100 mg total) by mouth 2 (two) times daily. 180 tablet 1    insulin aspart U-100 (NOVOLOG FLEXPEN U-100 INSULIN) 100 unit/mL (3 mL) InPn pen Inject 8 units 3 times a day by subcutaneous route before meals (Patient taking differently: Inject 8 Units into the skin 3 (three) times daily before meals.) 24 mL 1    insulin detemir U-100, Levemir, (LEVEMIR FLEXTOUCH U100 INSULIN) 100 unit/mL (3 mL) InPn pen Inject 21 units every day by subcutaneous route in the evening for 90 days. (Patient taking differently: Inject 21 Units into the skin every evening.) 15 mL 1    isosorbide mononitrate (IMDUR) 30 MG 24 hr tablet Take 1 tablet (30 mg total) by mouth once daily. 90 tablet 1    ketorolac 0.5% (ACULAR) 0.5 % Drop Instill 1 drop into the left eye 4 times a day for 30 days (Patient taking differently: Place 1 drop into both eyes 4 (four) times daily.) 3 mL  "5    lancets (TRUEPLUS LANCETS) 33 gauge Misc Use 1 to check blood glucose three times daily 100 each 11    lancets 33 gauge Misc Use to test twice daily 100 each 5    levETIRAcetam (KEPPRA) 500 MG Tab Take 1 tablet twice a day by oral route for 30 days. (Patient taking differently: Take 500 mg by mouth 2 (two) times daily.) 60 tablet 2    ondansetron (ZOFRAN-ODT) 4 MG TbDL Place 1 tablet (4 mg total) under the tongue every 8 (eight) hours. 24 tablet 2    oxyCODONE-acetaminophen (PERCOCET) 5-325 mg per tablet Take 1 tablet by mouth every 6 (six) hours as needed for pain. (Patient taking differently: Take 1 tablet by mouth every 6 (six) hours as needed for Pain.) 15 tablet 0    pantoprazole (PROTONIX) 40 MG tablet Take 1 tablet (40 mg total) by mouth once daily. 90 tablet 1    pen needle, diabetic 31 gauge x 3/16" Ndle Use as directed to inject insulin 5 times daily 100 each 11    prednisoLONE acetate (PRED FORTE) 1 % DrpS Instill 1 drop into the left eye 4 times a day for 30 days (Patient taking differently: Place 1 drop into both eyes 4 (four) times daily.) 5 mL 1    promethazine (PHENERGAN) 25 MG tablet Take 1 tablet (25 mg total) by mouth every 6 (six) hours as needed for 5 days. (Patient taking differently: Take 25 mg by mouth every 6 (six) hours as needed for Nausea.) 20 tablet 2    sevelamer carbonate (RENVELA) 800 mg Tab Take 2 tablets with meals three times a day (Patient taking differently: Take 1,600 mg by mouth 3 (three) times daily with meals.) 180 tablet 11    sucralfate (CARAFATE) 100 mg/mL suspension Take 10 mL 4 times a day by oral route before meals for 30 days. (Patient taking differently: Take 1 g by mouth 4 (four) times daily with meals and nightly.) 1200 mL 1    sucroferric oxyhydroxide (VELPHORO) 500 mg Chew Take 1 tablet 3 times a day (Patient taking differently: Take 1 tablet by mouth 3 (three) times daily.) 90 tablet 6    timolol maleate 0.5% (TIMOPTIC) 0.5 % Drop Instill 1 drop into the " "left eye 2 times a day for 30 days (Patient taking differently: Place 1 drop into both eyes 2 (two) times daily.) 5 mL 6    [DISCONTINUED] atenoloL (TENORMIN) 50 MG tablet Take 1 tablet (50 mg total) by mouth every other day. 45 tablet 3    [DISCONTINUED] omeprazole (PRILOSEC) 20 MG capsule Take 2 capsules (40 mg total) by mouth once daily. for 10 days 20 capsule 0     Scheduled Meds:   amLODIPine  5 mg Oral Daily    apixaban  2.5 mg Oral BID    carvediloL  25 mg Oral BID    hydrALAZINE  100 mg Oral BID    isosorbide mononitrate  30 mg Oral Daily    levETIRAcetam  500 mg Oral BID    mupirocin   Nasal BID    pantoprazole  40 mg Oral Before breakfast    sevelamer carbonate  1,600 mg Oral TID WM    sucralfate  1 g Oral QID (WM & HS)     Continuous Infusions:  PRN Meds:.heparin (porcine), hydrALAZINE, melatonin, morphine    Allergies:  Penicillins    Past Family History:  Reviewed; refer to Hospitalist Admission Note    Review of Systems:  Review of Systems - All 14 systems reviewed and negative, except as noted in HPI    Physical Exam:    BP (!) 146/81 (BP Location: Right forearm, Patient Position: Lying)   Pulse 84   Temp 99.1 °F (37.3 °C) (Oral)   Resp 17   Ht 5' 2" (1.575 m)   Wt 56.7 kg (125 lb)   LMP  (LMP Unknown)   SpO2 (!) 93%   Breastfeeding No   BMI 22.86 kg/m²     General Appearance:    Tearful, moaning   Head:    Normocephalic, without obvious abnormality, atraumatic   Eyes:    PER, conjunctiva/corneas clear, EOM's intact in both eyes        Throat:   Lips, mucosa, and tongue normal; teeth and gums normal   Back:     Symmetric, no curvature, ROM normal, no CVA tenderness   Lungs:     Clear to auscultation bilaterally, respirations unlabored   Chest wall:    No tenderness or deformity   Heart:    Regular rate and rhythm, S1 and S2 normal, no murmur, rub   or gallop   Abdomen:     Tender.     Extremities:   Extremities normal, atraumatic, no cyanosis or edema   Pulses:   2+ and symmetric all " "extremities   MSK:   No joint or muscle swelling, tenderness or deformity   Skin:   Skin color, texture, turgor normal, no rashes or lesions   Neurologic:   CNII-XII intact, normal strength and sensation       Throughout.  No flap     Results:  Lab Results   Component Value Date     02/06/2024    K 5.4 (H) 02/06/2024    CL 97 02/06/2024    CO2 22 (L) 02/06/2024    BUN 41 (H) 02/06/2024    CREATININE 8.4 (H) 02/06/2024    CALCIUM 8.7 02/06/2024    ANIONGAP 17 (H) 02/06/2024    ESTGFRAFRICA 19 (L) 09/03/2022    EGFRNONAA 18 (A) 07/31/2022       Lab Results   Component Value Date    CALCIUM 8.7 02/06/2024    PHOS 4.0 01/25/2024       No results for input(s): "WBC", "RBC", "HGB", "HCT", "PLT", "MCV", "MCH", "MCHC" in the last 24 hours.         I have personally reviewed pertinent radiological imaging and reports.    Imaging Results              CT Head Without Contrast (Final result)  Result time 02/07/24 03:10:06      Final result by Varun Nicholson MD (02/07/24 03:10:06)                   Narrative:    EXAM: CT head  without  contrast    COMPARISON:  CT head from October 3, 2023    HISTORY: Altered mental status    TECHNIQUE: Helical axial CT of the head was performed without  contrast. Coronal and sagittal reconstructions were performed. All CT scans at this facility use dose modulation, iterative reconstruction, and/or weight based dosing when appropriate to reduce radiation dose to as low as reasonably achievable.  861  mGy-cm DLP.    FINDINGS:  The examination is stable intracranially.  There is no acute intracranial abnormality.   There is no hemorrhage, mass, midline shift, abnormal extra-axial fluid collection, hydrocephalus or evolving ischemia.  The gray-white matter junction is well maintained.  There is no evidence of intravascular clot.   Brain parenchyma, ventricles and sulci are normal.    There are no acute calvarial lesions. There are new postsurgical changes identified involving the left lobe. " The mastoid air cells demonstrate no significant  soft tissue opacification. The visualized paranasal sinuses are clear.  -----------------------------------------------------  IMPRESSION:  1. There is no acute intracranial abnormality. There is no evolving ischemia or hemorrhage or mass.  -----------------------------------------------------    Electronically signed by:  Varun Nicholson MD  02/07/2024 03:10 AM [facility] Workstation: [id number]                                     CT Abdomen Pelvis  Without Contrast (Final result)  Result time 02/06/24 23:02:23      Final result by Angelo Winston MD (02/06/24 23:02:23)                   Narrative:    EXAM: CT Abdomen and Pelvis without contrast    INDICATION: Abdominal pain    TECHNIQUE: Helical CT of the abdomen and pelvis was obtained from the diaphragm through the ischial tuberosities without contrast. 5 mm axial images were created as were coronal and sagittal reformats.    Dose reduction techniques were used including automated exposure control and/or adjustment of the mA and/or kV according to patient size.    COMPARISON: CT from 10/3/2023, 1/15/2024    The lack of IV contrast significantly decreases the sensitivity of this study for the evaluation of solid abdominal organs, hollow viscera and vascular structures.    FINDINGS:  LUNG BASES: Cardiomegaly with small pericardial effusion. Bilateral groundglass opacities and nodularity, worsened since comparison CT from 1/2024, concerning for infectious process with early edema not excluded. Index right lower lobe nodular opacity on series 3 image 15 measures 12 x 8 mm.    STOMACH/DUODENUM: No significant finding.    LIVER: No significant abnormality.    BILIARY: Cholecystectomy.    PANCREAS: No significant abnormality.    SPLEEN: No significant abnormality.    ADRENAL GLANDS: No significant abnormality.    KIDNEYS/BLADDER:  No significant abnormality.    VESSELS: Advanced atherosclerotic calcifications involving  the intra-abdominal major branch vessels. Nonaneurysmal abdominal aorta.    LYMPH NODES: No enlarged abdominal or pelvic lymph nodes.    OTHER PELVIC: Small volume free pelvic fluid.    GI TRACT: No bowel obstruction. Normal appendix.    ABDOMINAL WALL: Diffuse anasarca. Unchanged left lower quadrant anterior abdominal wall stranding seen around image 168 likely from injection site.    PERITONEUM/MESENTERY: No pneumoperitoneum.    BONES/SPINE: No acute abnormality.      IMPRESSION:  Bibasilar groundglass opacities and nodularity concerning for infectious process versus developing edema.    Small pericardial effusion and diffuse anasarca, likely secondary to cardiac and/or renal disease.    No other acute finding in the abdomen or pelvis.    Cardiomegaly.    Electronically signed by:  Angelo Winston MD  02/06/2024 11:02 PM CST Workstation: [id number]                                     X-Ray Chest AP Portable (Final result)  Result time 02/06/24 23:05:26      Final result by Angelo Winston MD (02/06/24 23:05:26)                   Narrative:    EXAM: Single XR view of the chest  INDICATION: Abdominal pain  COMPARISON: 10/13/2023 radiograph    FINDINGS:  Right IJ Vas-Cath is stable in position.  The cardiomediastinal silhouette is enlarged, stable.  Subtle bilateral patchy opacities, better demonstrated on comparison CT. No significant pleural effusion or visible pneumothorax.    IMPRESSION:  Subtle bilateral patchy opacities concerning for mild edema and/or infection.    Electronically signed by:  Angelo Winston MD  02/06/2024 11:05 PM CST Workstation: [id number]                                    Patient care was time spent personally by me on the following activities:   Obtaining a history  Examination of patient.  Providing medical care at the patients bedside.  Developing a treatment plan with patient or surrogate and bedside caregivers  Ordering and reviewing laboratory studies, radiographic studies,  pulse oximetry.  Ordering and performing treatments and interventions.  Evaluation of patient's response to treatment.  Discussions with consultants while on the unit and immediately available to the patient.  Re-evaluation of the patient's condition.  Documentation in the medical record.     Hugh Figueroa MD  Nephrology  [facility]  [phone]

## 2024-02-08 NOTE — HOSPITAL COURSE
Ms. Howard was monitored closely during her hospitalization. She missed HD again and developed HTN emergency and hyperkalemia. She was treated with HD on 2/7 and 2/8 with improvement of BP and normalization of potassium. CXR with patchy opacities concerning for edema vs. Infection. She remained afebrile, WBC WNL on arrival, lactate WNL. Procal elevated to 0.5, but with known chronic elevation over the last year from 0.5-1 in ESRD. No signs of acute lower resp infection clinically, patchy opacities more likely related to edema d/t missed HD. She has frequent readmissions for noncompliance with HD and medication regimen. Pt is monitored closely by nephrology and cleared for discharge with close outpt follow up at dialysis center after receiving HD today. Pt was seen and examined on the date of discharge.

## 2024-02-08 NOTE — NURSING
Nurses Note -- 4 Eyes      2/7/2024   6:56 PM      Skin assessed during: Admit      [x] No Altered Skin Integrity Present    []Prevention Measures Documented      [] Yes- Altered Skin Integrity Present or Discovered   [] LDA Added if Not in Epic (Describe Wound)   [] New Altered Skin Integrity was Present on Admit and Documented in LDA   [] Wound Image Taken    Wound Care Consulted? No    Attending Nurse:  Odell Woo RN/Staff Member:   KINGSLEY Hastings

## 2024-02-08 NOTE — PLAN OF CARE
Discharge orders and chart reviewed. No other discharge needs noted at this time. Pt is clear for discharge from case management, after HD. Pt is discharging to home.     Patient to follow up with Dr. Figueroa at HD clinic     02/08/24 1212   Final Note   Assessment Type Final Discharge Note   Anticipated Discharge Disposition Home   What phone number can be called within the next 1-3 days to see how you are doing after discharge? [phone]   Hospital Resources/Appts/Education Provided Patient refused appointment set-up   Post-Acute Status   Discharge Delays (!) Procedure Scheduling (IR, OR, Labs, Echo, Cath, Echo, EEG)

## 2024-02-09 NOTE — DISCHARGE SUMMARY
[facility] Medicine  Discharge Summary      Patient Name: Tabby Howard  MRN: [id number]  [facility]: [id number]  Patient Class: OP- Observation  Admission Date: 2/6/2024  Hospital Length of Stay: 1 days  Discharge Date and Time: 2/8/2024  6:20 PM  Attending Physician: No att. providers found   Discharging Provider: Esperanza Guy NP  Primary Care Provider: Melony Kim NP    Primary Care Team: Networked reference to record PCT     HPI:   Patient was a 34-year-old female with a history of ESRD on hemodialysis, gastroparesis, SVT, hyperkalemia, hypertensive emergency, type 2 diabetes mellitus who presented to the emergency department for evaluation of abdominal pain.  She was recently discharged from [facility] for similar symptoms.  She was found to have symptomatic anemia.  She was given 1 unit PRBC and underwent dialysis.  She was placed on a Cardene drip for hypertensive urgency then left AMA after dialysis.    She has had multiple hospitalizations in the past couple of months for similar symptoms.  She was transferred over to [facility] in December and received a GI and hematology workup in December.  Sickle cell crisis unlikely as patient has sickle cell trait.  EGD with EUS was performed on 12/16/2023 which was overall normal.  Conservative measures were taken and symptoms resolved.    During my encounter, patient was moaning in pain.  Complaining of central chest pressure that is constant that has been happening for days.  She no longer admits to abdominal pain.  She repeated herself often and did not answer the rest of my questions.    In the ED, blood pressure 156/99, heart rate 74, respirations 18, 98% on room air.    WBCs 6.81, hemoglobin 10.3, platelets 135, sodium 136, potassium 5.4, bicarb 22, anion gap 17, creatinine 8.4,.  lipase 13  CT abdomen and pelvis shows bibasilar ground-glass opacities and nodularity concerning for infectious process versus developing  edema.  Small pericardial effusion and diffuse anasarca.    * No surgery found *      Hospital Course:   Ms. Howard was monitored closely during her hospitalization. She missed HD again and developed HTN emergency and hyperkalemia. She was treated with HD on 2/7 and 2/8 with improvement of BP and normalization of potassium. CXR with patchy opacities concerning for edema vs. Infection. She remained afebrile, WBC WNL on arrival, lactate WNL. Procal elevated to 0.5, but with known chronic elevation over the last year from 0.5-1 in ESRD. No signs of acute lower resp infection clinically, patchy opacities more likely related to edema d/t missed HD. She has frequent readmissions for noncompliance with HD and medication regimen. Pt is monitored closely by nephrology and cleared for discharge with close outpt follow up at dialysis center after receiving HD today. Pt was seen and examined on the date of discharge.     Goals of Care Treatment Preferences:  Code Status: Full Code    Health care agent: Step-Mother Tanya Howard / Father Garcia Howard  Health care agent number: [phone] / [phone]                   Consults:   Consults (From admission, onward)          Status Ordering Provider     Inpatient consult to Nephrology  Once        Provider:  Hugh Figueroa MD    Completed GENEVA BAÑUELOS            No new Assessment & Plan notes have been filed under this hospital service since the last note was generated.  Service: Hospital Medicine    Final Active Diagnoses:    Diagnosis Date Noted POA    PRINCIPAL PROBLEM:  Hypertensive emergency [I16.1] 06/01/2022 Yes    Frequent hospital admissions [Z78.9] 03/10/2023 Yes    Chest pain [R07.9] 08/04/2022 Yes    ESRD (end stage renal disease) [N18.6] 07/22/2022 Yes    Sickle cell trait [D57.3] 04/12/2022 Yes     Chronic      Problems Resolved During this Admission:       Discharged Condition: stable    Disposition: Home or Self Care    Follow Up:   Follow-up Information   "     Hugh Figueroa MD Follow up in 1 week(s).    Specialty: Nephrology  Why: follow up at HD clinic  Contact information:  [place] [facility]  [place] [id number] [phone]                           Patient Instructions:      Diet renal     Diet diabetic     Notify your health care provider if you experience any of the following:  temperature >100.4     Notify your health care provider if you experience any of the following:  persistent nausea and vomiting or diarrhea     Notify your health care provider if you experience any of the following:  persistent dizziness, light-headedness, or visual disturbances     Notify your health care provider if you experience any of the following:  severe persistent headache     Notify your health care provider if you experience any of the following:  difficulty breathing or increased cough     Activity as tolerated       Significant Diagnostic Studies: Labs: CMP   Recent Labs   Lab 02/08/24  0811      K 4.9      CO2 24   *   BUN 29*   CREATININE 6.7*   CALCIUM 8.1*   PROT 6.1   ALBUMIN 3.5   BILITOT 0.9   ALKPHOS 108   AST 11   ALT 5*   ANIONGAP 10    and CBC   Recent Labs   Lab 02/08/24  0811   WBC 3.49*   HGB 8.3*   HCT 24.9*   PLT 98*       Pending Diagnostic Studies:       None           Medications:  Reconciled Home Medications:      Medication List        CONTINUE taking these medications      acetaZOLAMIDE 250 MG tablet  Commonly known as: DIAMOX  Take 1 tablet (250 mg total) by mouth 3 (three) times daily. for 10 days     amLODIPine 5 MG tablet  Commonly known as: NORVASC  Take 1 tablet (5 mg total) by mouth once daily.     BD ULTRA-FINE MINI PEN NEEDLE 31 gauge x 3/16" Ndle  Generic drug: pen needle, diabetic  Use as directed to inject insulin 5 times daily     carvediloL 25 MG tablet  Commonly known as: COREG  Take 1 tablet (25 mg total) by mouth 2 (two) times daily.     cetirizine 10 MG tablet  Commonly known as: " ZYRTEC  Take 1 tablet (10 mg total) by mouth once daily.     ciprofloxacin HCl 0.3 % ophthalmic solution  Commonly known as: CILOXAN  Instill 1 drop into the left eye 4 times a day for 30 days     ELIQUIS 5 mg Tab  Generic drug: apixaban  Take 1 tablet (5 mg total) by mouth 2 (two) times daily.     FLUoxetine 20 MG capsule  Take 1 capsule every day by oral route for 90 days.     gabapentin 300 MG capsule  Commonly known as: NEURONTIN  Take 2 capsules twice a day by oral route for 90 days.     hydrALAZINE 100 MG tablet  Commonly known as: APRESOLINE  Take 1 tablet (100 mg total) by mouth 2 (two) times daily.     insulin aspart U-100 100 unit/mL (3 mL) Inpn pen  Commonly known as: NovoLOG Flexpen U-100 Insulin  Inject 8 units 3 times a day by subcutaneous route before meals     isosorbide mononitrate 30 MG 24 hr tablet  Commonly known as: IMDUR  Take 1 tablet (30 mg total) by mouth once daily.     ketorolac 0.5% 0.5 % Drop  Commonly known as: ACULAR  Instill 1 drop into the left eye 4 times a day for 30 days     * lancets 33 gauge Misc  Use to test twice daily     * TRUEPLUS LANCETS 33 gauge Misc  Generic drug: lancets  Use 1 to check blood glucose three times daily     LEVEMIR FLEXPEN 100 unit/mL (3 mL) Inpn pen  Generic drug: insulin detemir U-100 (Levemir)  Inject 21 units every day by subcutaneous route in the evening for 90 days.     levETIRAcetam 500 MG Tab  Commonly known as: KEPPRA  Take 1 tablet twice a day by oral route for 30 days.     ondansetron 4 MG Tbdl  Commonly known as: ZOFRAN-ODT  Place 1 tablet (4 mg total) under the tongue every 8 (eight) hours.     oxyCODONE-acetaminophen 5-325 mg per tablet  Commonly known as: PERCOCET  Take 1 tablet by mouth every 6 (six) hours as needed for pain.     pantoprazole 40 MG tablet  Commonly known as: PROTONIX  Take 1 tablet (40 mg total) by mouth once daily.     prednisoLONE acetate 1 % Drps  Commonly known as: PRED FORTE  Instill 1 drop into the left eye 4 times a  day for 30 days     promethazine 25 MG tablet  Commonly known as: PHENERGAN  Take 1 tablet (25 mg total) by mouth every 6 (six) hours as needed for 5 days.     RENVELA 800 mg Tab  Generic drug: sevelamer carbonate  Take 2 tablets with meals three times a day     sucralfate 100 mg/mL suspension  Commonly known as: CARAFATE  Take 10 mL 4 times a day by oral route before meals for 30 days.     timolol maleate 0.5% 0.5 % Drop  Commonly known as: TIMOPTIC  Instill 1 drop into the left eye 2 times a day for 30 days     TRUE METRIX GLUCOSE METER Misc  Generic drug: blood-glucose meter  Use to check blood glucose     TRUE METRIX GLUCOSE TEST STRIP Strp  Generic drug: blood sugar diagnostic  Use 1 strip to check blood glucose three times daily     VELPHORO 500 mg Chew  Generic drug: sucroferric oxyhydroxide  Take 1 tablet 3 times a day           * This list has 2 medication(s) that are the same as other medications prescribed for you. Read the directions carefully, and ask your doctor or other care provider to review them with you.                ASK your doctor about these medications      fluticasone propionate 50 mcg/actuation nasal spray  Commonly known as: FLONASE ALLERGY RELIEF  Spray 1 spray every day by intranasal route.              Indwelling Lines/Drains at time of discharge:   Lines/Drains/Airways       Central Venous Catheter Line  Duration                  Hemodialysis Catheter 06/17/23 1135 right internal jugular 236 days                    Time spent on the discharge of patient: 38 minutes         Esperanza Guy NP  Department of Hospital Medicine  [facility]

## 2024-02-21 DIAGNOSIS — N64.4 MASTODYNIA: Primary | ICD-10-CM

## 2024-02-21 NOTE — CARE UPDATE
02/21/24                            Rosalva Cazares  [address]  [facility] [place] [address]    To Whom It May Concern:    This is to certify Rosalva Cazares was evaluated with Napoleon Melara PA-C on 02/21/24 and can return to  on 2/23/2024.  I have prescribed Polytrim ophthalmic drops which should be applied 1 drop 4 times a day for up to 7 days.  You are to apply the drops to the affected eye.  If the other eye becomes red or purulent also applied to that eye.     RESTRICTIONS: None            Electronically signed by:  Napoleon Melara PA-C  [facility]  [address]  [place] [address]  Dept Phone: [phone]           12/11/22 0705   Patient Assessment/Suction   Level of Consciousness (AVPU) alert   PRE-TX-O2   Device (Oxygen Therapy) nasal cannula   $ Is the patient on Low Flow Oxygen? Yes   Flow (L/min) 2   SpO2 99 %   Pulse Oximetry Type Continuous   $ Pulse Oximetry - Multiple Charge Pulse Oximetry - Multiple   Pulse 80   Resp 20

## 2024-02-24 ENCOUNTER — HOSPITAL ENCOUNTER (INPATIENT)
Facility: HOSPITAL | Age: 34
LOS: 4 days | Discharge: HOME | DRG: 116 | End: 2024-02-28
Attending: EMERGENCY MEDICINE | Admitting: FAMILY MEDICINE
Payer: MEDICAID

## 2024-02-24 ENCOUNTER — HOSPITAL ENCOUNTER (EMERGENCY)
Facility: HOSPITAL | Age: 34
Discharge: HOME | End: 2024-02-24
Attending: EMERGENCY MEDICINE
Payer: MEDICAID

## 2024-02-24 VITALS
DIASTOLIC BLOOD PRESSURE: 100 MMHG | TEMPERATURE: 99 F | OXYGEN SATURATION: 100 % | HEART RATE: 93 BPM | SYSTOLIC BLOOD PRESSURE: 180 MMHG | RESPIRATION RATE: 19 BRPM

## 2024-02-24 DIAGNOSIS — N18.6 ANEMIA IN ESRD (END-STAGE RENAL DISEASE): ICD-10-CM

## 2024-02-24 DIAGNOSIS — N18.6 ESRD ON HEMODIALYSIS: ICD-10-CM

## 2024-02-24 DIAGNOSIS — H57.12 LEFT EYE PAIN: ICD-10-CM

## 2024-02-24 DIAGNOSIS — H57.12 ACUTE LEFT EYE PAIN: Primary | ICD-10-CM

## 2024-02-24 DIAGNOSIS — Z99.2 ESRD ON HEMODIALYSIS: ICD-10-CM

## 2024-02-24 DIAGNOSIS — H40.9 ACUTE GLAUCOMA OF LEFT EYE: Primary | ICD-10-CM

## 2024-02-24 DIAGNOSIS — D63.1 ANEMIA IN ESRD (END-STAGE RENAL DISEASE): ICD-10-CM

## 2024-02-24 DIAGNOSIS — H21.02 HYPHEMA OF LEFT EYE: ICD-10-CM

## 2024-02-24 DIAGNOSIS — H40.052 INCREASED PRESSURE IN THE EYE, LEFT: ICD-10-CM

## 2024-02-24 DIAGNOSIS — I16.0 HYPERTENSIVE URGENCY: ICD-10-CM

## 2024-02-24 DIAGNOSIS — G89.18 POSTOPERATIVE PAIN: ICD-10-CM

## 2024-02-24 DIAGNOSIS — H57.11 EYE PAIN, RIGHT: ICD-10-CM

## 2024-02-24 DIAGNOSIS — R07.9 CHEST PAIN: ICD-10-CM

## 2024-02-24 LAB
BUN SERPL-MCNC: 8 MG/DL (ref 6–30)
CHLORIDE SERPL-SCNC: 95 MMOL/L (ref 95–110)
CREAT SERPL-MCNC: 4.2 MG/DL (ref 0.5–1.4)
GLUCOSE SERPL-MCNC: 186 MG/DL (ref 70–110)
HCT VFR BLD CALC: 31 %PCV (ref 36–54)
POC IONIZED CALCIUM: 0.99 MMOL/L (ref 1.06–1.42)
POC TCO2 (MEASURED): 31 MMOL/L (ref 23–27)
POTASSIUM BLD-SCNC: 3.7 MMOL/L (ref 3.5–5.1)
SAMPLE: ABNORMAL
SODIUM BLD-SCNC: 138 MMOL/L (ref 136–145)

## 2024-02-24 PROCEDURE — 25000003 PHARM REV CODE 250: Performed by: EMERGENCY MEDICINE

## 2024-02-24 PROCEDURE — 63600175 PHARM REV CODE 636 W HCPCS: Performed by: EMERGENCY MEDICINE

## 2024-02-24 PROCEDURE — 93010 ELECTROCARDIOGRAM REPORT: CPT | Mod: ,,, | Performed by: INTERNAL MEDICINE

## 2024-02-24 PROCEDURE — 96376 TX/PRO/DX INJ SAME DRUG ADON: CPT

## 2024-02-24 PROCEDURE — 63600175 PHARM REV CODE 636 W HCPCS: Performed by: PHYSICIAN ASSISTANT

## 2024-02-24 PROCEDURE — 80047 BASIC METABLC PNL IONIZED CA: CPT

## 2024-02-24 PROCEDURE — 25000003 PHARM REV CODE 250: Performed by: PHYSICIAN ASSISTANT

## 2024-02-24 PROCEDURE — 96365 THER/PROPH/DIAG IV INF INIT: CPT

## 2024-02-24 PROCEDURE — 12000002 HC ACUTE/MED SURGE SEMI-PRIVATE ROOM

## 2024-02-24 PROCEDURE — 99285 EMERGENCY DEPT VISIT HI MDM: CPT

## 2024-02-24 PROCEDURE — 93005 ELECTROCARDIOGRAM TRACING: CPT

## 2024-02-24 PROCEDURE — 96375 TX/PRO/DX INJ NEW DRUG ADDON: CPT

## 2024-02-24 PROCEDURE — 08933ZZ DRAINAGE OF LEFT ANTERIOR CHAMBER, PERCUTANEOUS APPROACH: ICD-10-PCS | Performed by: OPHTHALMOLOGY

## 2024-02-24 PROCEDURE — 99285 EMERGENCY DEPT VISIT HI MDM: CPT | Mod: 25

## 2024-02-24 RX ORDER — ERYTHROMYCIN 5 MG/G
OINTMENT OPHTHALMIC
Status: COMPLETED | OUTPATIENT
Start: 2024-02-24 | End: 2024-02-24

## 2024-02-24 RX ORDER — ONDANSETRON 4 MG/1
4 TABLET, ORALLY DISINTEGRATING ORAL
Status: COMPLETED | OUTPATIENT
Start: 2024-02-24 | End: 2024-02-24

## 2024-02-24 RX ORDER — FENTANYL CITRATE 50 UG/ML
50 INJECTION, SOLUTION INTRAMUSCULAR; INTRAVENOUS
Status: COMPLETED | OUTPATIENT
Start: 2024-02-24 | End: 2024-02-24

## 2024-02-24 RX ORDER — ACETAZOLAMIDE 500 MG/5ML
500 INJECTION, POWDER, LYOPHILIZED, FOR SOLUTION INTRAVENOUS ONCE
Status: DISCONTINUED | OUTPATIENT
Start: 2024-02-24 | End: 2024-02-24

## 2024-02-24 RX ORDER — OXYCODONE HYDROCHLORIDE 5 MG/1
5 TABLET ORAL
Status: COMPLETED | OUTPATIENT
Start: 2024-02-24 | End: 2024-02-24

## 2024-02-24 RX ORDER — TIMOLOL MALEATE 5 MG/ML
1 SOLUTION/ DROPS OPHTHALMIC 2 TIMES DAILY
Status: DISCONTINUED | OUTPATIENT
Start: 2024-02-24 | End: 2024-02-24 | Stop reason: HOSPADM

## 2024-02-24 RX ORDER — PROPARACAINE HYDROCHLORIDE 5 MG/ML
1 SOLUTION/ DROPS OPHTHALMIC
Status: COMPLETED | OUTPATIENT
Start: 2024-02-24 | End: 2024-02-24

## 2024-02-24 RX ORDER — ACETAZOLAMIDE 250 MG/1
500 TABLET ORAL ONCE
Status: COMPLETED | OUTPATIENT
Start: 2024-02-24 | End: 2024-02-24

## 2024-02-24 RX ORDER — TETRACAINE HYDROCHLORIDE 5 MG/ML
2 SOLUTION OPHTHALMIC
Status: COMPLETED | OUTPATIENT
Start: 2024-02-24 | End: 2024-02-24

## 2024-02-24 RX ORDER — ACETAZOLAMIDE 500 MG/5ML
125 INJECTION, POWDER, LYOPHILIZED, FOR SOLUTION INTRAVENOUS ONCE
Status: DISCONTINUED | OUTPATIENT
Start: 2024-02-24 | End: 2024-02-24

## 2024-02-24 RX ORDER — HYDROCODONE BITARTRATE AND ACETAMINOPHEN 10; 325 MG/1; MG/1
1 TABLET ORAL
Status: COMPLETED | OUTPATIENT
Start: 2024-02-24 | End: 2024-02-24

## 2024-02-24 RX ORDER — HYDRALAZINE HYDROCHLORIDE 25 MG/1
100 TABLET, FILM COATED ORAL
Status: COMPLETED | OUTPATIENT
Start: 2024-02-24 | End: 2024-02-24

## 2024-02-24 RX ADMIN — ERYTHROMYCIN: 5 OINTMENT OPHTHALMIC at 11:02

## 2024-02-24 RX ADMIN — DEXTROSE 500 MG: 50 INJECTION, SOLUTION INTRAVENOUS at 09:02

## 2024-02-24 RX ADMIN — ONDANSETRON 4 MG: 4 TABLET, ORALLY DISINTEGRATING ORAL at 02:02

## 2024-02-24 RX ADMIN — FENTANYL CITRATE 50 MCG: 50 INJECTION INTRAMUSCULAR; INTRAVENOUS at 09:02

## 2024-02-24 RX ADMIN — FLUORESCEIN SODIUM 1 EACH: 1 STRIP OPHTHALMIC at 06:02

## 2024-02-24 RX ADMIN — PROPARACAINE HYDROCHLORIDE 1 DROP: 5 SOLUTION/ DROPS OPHTHALMIC at 06:02

## 2024-02-24 RX ADMIN — TETRACAINE HYDROCHLORIDE 2 DROP: 5 SOLUTION OPHTHALMIC at 02:02

## 2024-02-24 RX ADMIN — OXYCODONE 5 MG: 5 TABLET ORAL at 06:02

## 2024-02-24 RX ADMIN — HYDROCODONE BITARTRATE AND ACETAMINOPHEN 1 TABLET: 10; 325 TABLET ORAL at 02:02

## 2024-02-24 RX ADMIN — PROPARACAINE HYDROCHLORIDE 1 DROP: 5 SOLUTION/ DROPS OPHTHALMIC at 11:02

## 2024-02-24 RX ADMIN — HYDRALAZINE HYDROCHLORIDE 100 MG: 25 TABLET ORAL at 06:02

## 2024-02-24 RX ADMIN — FENTANYL CITRATE 50 MCG: 50 INJECTION INTRAMUSCULAR; INTRAVENOUS at 11:02

## 2024-02-24 RX ADMIN — ACETAZOLAMIDE 500 MG: 250 TABLET ORAL at 03:02

## 2024-02-24 NOTE — ED PROVIDER NOTES
Encounter Date: 2024       History   No chief complaint on file.    This is a 34-year-old female with multiple chronic comorbidities who presents emergency department with left eye pain.  Patient states that she had retinal detachment surgery on her left eye aproximally 2 weeks ago at [facility], she thinks.  She has not sure of the hospital and she has not sure of the surgeon who operated on her eye.  She says that she has had a lot of pain ever since then.  Says she saw her ophthalmologist who did the surgery on either Tuesday or Wednesday and he told her that she was going to have pain.  He added another eyedrops on.  She says that the pain has been persistent.  He said that she needs to see a glaucoma specialist.  She has an appointment with this glaucoma specialist on Tuesday.  She has an appointment with her ophthalmologist she says on Monday.  Says she can not take the pain.  She has been doing the drops as directed she said.  She chronically takes opioids for pain.  She has been taking this but no relief.  Patient did go to dialysis today and had her full session.          Review of patient's allergies indicates:   Allergen Reactions    Penicillins Hives     Past Medical History:   Diagnosis Date    ESRD on hemodialysis 2022    Gastritis 2022    EGD was 21    Gastroparesis 2022    has not had Emptying study    Heart failure with preserved ejection fraction 2022    EF 55% on 3/22    History of supraventricular tachycardia     Hyperkalemia 2022    Hypertensive emergency 2022    Sickle cell trait 2022    Type 2 diabetes mellitus      Past Surgical History:   Procedure Laterality Date     SECTION      x 3    COLONOSCOPY      COLONOSCOPY N/A 2022    Procedure: COLONOSCOPY;  Surgeon: Jagdeep Cedeno MD;  Location: [facility];  Service: Endoscopy;  Laterality: N/A;    ENDOSCOPIC ULTRASOUND OF UPPER GASTROINTESTINAL TRACT N/A 2023     Procedure: ULTRASOUND, UPPER GI TRACT, ENDOSCOPIC;  Surgeon: Micky Paredes III, MD;  Location: [facility] ENDO;  Service: Endoscopy;  Laterality: N/A;    ESOPHAGOGASTRODUODENOSCOPY N/A 10/18/2019    Procedure: ESOPHAGOGASTRODUODENOSCOPY (EGD);  Surgeon: Gianluca Mendez MD;  Location: [facility] ENDO;  Service: Endoscopy;  Laterality: N/A;    ESOPHAGOGASTRODUODENOSCOPY N/A 08/24/2022    Procedure: EGD (ESOPHAGOGASTRODUODENOSCOPY);  Surgeon: Micky Paredes III, MD;  Location: [facility];  Service: Endoscopy;  Laterality: N/A;    ESOPHAGOGASTRODUODENOSCOPY N/A 12/5/2022    Procedure: EGD (ESOPHAGOGASTRODUODENOSCOPY);  Surgeon: Marcelo Zhong MD;  Location: [facility] ENDO;  Service: Endoscopy;  Laterality: N/A;    INSERTION, CATHETER, TUNNELED N/A 6/17/2023    Procedure: Insertion,catheter,tunneled;  Surgeon: Carlos Thurman Jr., MD;  Location: [facility] OR;  Service: General;  Laterality: N/A;    LAPAROSCOPIC CHOLECYSTECTOMY N/A 07/30/2022    Procedure: CHOLECYSTECTOMY, LAPAROSCOPIC;  Surgeon: Grey Perez MD;  Location: [facility] OR;  Service: General;  Laterality: N/A;    PLACEMENT OF DUAL-LUMEN VASCULAR CATHETER Left 07/12/2022    Procedure: INSERTION-CATHETER-JOSEPH;  Surgeon: Dionte Gan MD;  Location: [facility] OR;  Service: General;  Laterality: Left;    PLACEMENT OF DUAL-LUMEN VASCULAR CATHETER Right 07/26/2022    Procedure: INSERTION-CATHETER-Hemosplit;  Surgeon: Dionte Gan MD;  Location: [facility] OR;  Service: General;  Laterality: Right;     Family History   Problem Relation Age of Onset    Diabetes Mother     Diabetes Father      Social History     Tobacco Use    Smoking status: Never    Smokeless tobacco: Never   Substance Use Topics    Alcohol use: Not Currently    Drug use: No     Review of Systems   Constitutional:  Negative for chills and fever.   HENT:  Negative for sore throat.    Eyes:  Positive for pain.   Respiratory:  Negative for shortness of breath.    Cardiovascular:  Negative for chest pain  and palpitations.   Gastrointestinal:  Negative for abdominal pain, nausea and vomiting.   Genitourinary:  Negative for dysuria.   Musculoskeletal:  Negative for back pain.   Skin:  Negative for rash.   Neurological:  Negative for weakness and headaches.   Hematological:  Does not bruise/bleed easily.   All other systems reviewed and are negative.      Physical Exam     Initial Vitals [02/24/24 1432]   BP Pulse Resp Temp SpO2   (!) 190/80 92 20 98.6 °F (37 °C) 98 %      MAP       --         Physical Exam    Nursing note and vitals reviewed.  Constitutional: She appears well-developed and well-nourished. No distress.   HENT:   Head: Normocephalic and atraumatic.   Mouth/Throat: No oropharyngeal exudate.   Eyes: Conjunctivae are normal.   Left eye:  Cloudy cornea, pupil appears to be minimally reactive.  There appears to be blood in the anterior chamber.  Marked conjunctival injection.  Patient seems to have extraocular muscles which are intact but exam is limited by pain.     Intra-ocular pressure in the right eyes in the 30 range.  Intra-ocular pressure in the left eye ranging from 12 to 30 to 80.   Neck: Neck supple. No tracheal deviation present.   Normal range of motion.  Cardiovascular:  Normal rate, regular rhythm, normal heart sounds and intact distal pulses.           No murmur heard.  Pulmonary/Chest: Breath sounds normal. No respiratory distress. She has no wheezes. She has no rhonchi. She has no rales.   There is a dialysis catheter present in the right chest wall.   Abdominal: Abdomen is soft. She exhibits no distension. There is no abdominal tenderness.   Musculoskeletal:         General: No tenderness or edema. Normal range of motion.      Cervical back: Normal range of motion and neck supple.     Neurological: She is alert and oriented to person, place, and time. She has normal strength. No cranial nerve deficit.   Skin: Skin is warm and dry. Capillary refill takes less than 2 seconds. No rash noted.  No erythema. No pallor.   Psychiatric: She has a normal mood and affect. Thought content normal.         ED Course   Procedures  Labs Reviewed   CBC W/ AUTO DIFFERENTIAL   COMPREHENSIVE METABOLIC PANEL              Imaging Results    None          Medications   timolol maleate 0.5% ophthalmic solution 1 drop (1 drop Left Eye Not Given 2/24/24 1600)   TETRAcaine HCl (PF) 0.5 % Drop 2 drop (2 drops Left Eye Given 2/24/24 1415)   ondansetron disintegrating tablet 4 mg (4 mg Oral Given 2/24/24 1453)   HYDROcodone-acetaminophen  mg per tablet 1 tablet (1 tablet Oral Given 2/24/24 1452)   acetaZOLAMIDE tablet 500 mg (500 mg Oral Given 2/24/24 1552)     Medical Decision Making  Differential includes but not limited to retinal detachment, glaucoma, endophthalmitis, postoperative pain, postoperative infection    34-year-old female presents emergency department left eye pain status post eye surgery for retinal detachment she states 1-1/2-2 weeks ago.  Patient eye exam as noted above.  Patient with poor visual acuity left eye.  Patient with pain.  Patient has been given pain control in the emergency department.  Patient still with eye pain.  Uncertain what her intra-ocular pressure is.  Glaucoma high on the differential.  I did order timolol eyedrops to give p.o. see his all amide.  Did attempted to control the patient's pain.  She is given nausea medicine and pain medicine.  I discussed the case with Dr. Pierre at [facility] who states that he will evaluate the patient in the emergency room.  He recommends Acetazeolamide and timolol prior to transfer.  A dictation software program was used for this note.  Please expect some simple typographical  errors in this note.         Amount and/or Complexity of Data Reviewed  External Data Reviewed: labs and notes.  Labs: ordered. Decision-making details documented in ED Course.    Risk  Prescription drug management.                                      Clinical  Impression:  Final diagnoses:  [H57.12] Acute left eye pain (Primary)          ED Disposition Condition    Transfer to Another Facility Stable                Rob Shea DO  02/24/24 7052

## 2024-02-25 PROBLEM — H40.9: Status: ACTIVE | Noted: 2024-02-25

## 2024-02-25 LAB
ALBUMIN SERPL BCP-MCNC: 3.4 G/DL (ref 3.5–5.2)
ALP SERPL-CCNC: 110 U/L (ref 55–135)
ALT SERPL W/O P-5'-P-CCNC: <5 U/L (ref 10–44)
ANION GAP SERPL CALC-SCNC: 12 MMOL/L (ref 8–16)
AST SERPL-CCNC: 13 U/L (ref 10–40)
BASOPHILS # BLD AUTO: 0.04 K/UL (ref 0–0.2)
BASOPHILS NFR BLD: 0.9 % (ref 0–1.9)
BILIRUB SERPL-MCNC: 1.1 MG/DL (ref 0.1–1)
BUN SERPL-MCNC: 13 MG/DL (ref 6–20)
CALCIUM SERPL-MCNC: 9.1 MG/DL (ref 8.7–10.5)
CHLORIDE SERPL-SCNC: 98 MMOL/L (ref 95–110)
CO2 SERPL-SCNC: 28 MMOL/L (ref 23–29)
CREAT SERPL-MCNC: 4.8 MG/DL (ref 0.5–1.4)
DIFFERENTIAL METHOD BLD: ABNORMAL
EOSINOPHIL # BLD AUTO: 0.2 K/UL (ref 0–0.5)
EOSINOPHIL NFR BLD: 5 % (ref 0–8)
ERYTHROCYTE [DISTWIDTH] IN BLOOD BY AUTOMATED COUNT: 16.8 % (ref 11.5–14.5)
EST. GFR  (NO RACE VARIABLE): 11.5 ML/MIN/1.73 M^2
GLUCOSE SERPL-MCNC: 230 MG/DL (ref 70–110)
HCT VFR BLD AUTO: 31.5 % (ref 37–48.5)
HGB BLD-MCNC: 10.7 G/DL (ref 12–16)
IMM GRANULOCYTES # BLD AUTO: 0.01 K/UL (ref 0–0.04)
IMM GRANULOCYTES NFR BLD AUTO: 0.2 % (ref 0–0.5)
LYMPHOCYTES # BLD AUTO: 0.9 K/UL (ref 1–4.8)
LYMPHOCYTES NFR BLD: 19.2 % (ref 18–48)
MCH RBC QN AUTO: 30.1 PG (ref 27–31)
MCHC RBC AUTO-ENTMCNC: 34 G/DL (ref 32–36)
MCV RBC AUTO: 89 FL (ref 82–98)
MONOCYTES # BLD AUTO: 0.4 K/UL (ref 0.3–1)
MONOCYTES NFR BLD: 7.9 % (ref 4–15)
NEUTROPHILS # BLD AUTO: 3.1 K/UL (ref 1.8–7.7)
NEUTROPHILS NFR BLD: 66.8 % (ref 38–73)
NRBC BLD-RTO: 0 /100 WBC
OHS QRS DURATION: 86 MS
OHS QTC CALCULATION: 492 MS
PLATELET # BLD AUTO: 99 K/UL (ref 150–450)
PMV BLD AUTO: 11.6 FL (ref 9.2–12.9)
POCT GLUCOSE: 102 MG/DL (ref 70–110)
POCT GLUCOSE: 166 MG/DL (ref 70–110)
POCT GLUCOSE: 199 MG/DL (ref 70–110)
POCT GLUCOSE: 215 MG/DL (ref 70–110)
POCT GLUCOSE: 216 MG/DL (ref 70–110)
POTASSIUM SERPL-SCNC: 3.7 MMOL/L (ref 3.5–5.1)
PROT SERPL-MCNC: 6.7 G/DL (ref 6–8.4)
RBC # BLD AUTO: 3.56 M/UL (ref 4–5.4)
SODIUM SERPL-SCNC: 138 MMOL/L (ref 136–145)
WBC # BLD AUTO: 4.58 K/UL (ref 3.9–12.7)

## 2024-02-25 PROCEDURE — 25000003 PHARM REV CODE 250

## 2024-02-25 PROCEDURE — 11000001 HC ACUTE MED/SURG PRIVATE ROOM

## 2024-02-25 PROCEDURE — 80053 COMPREHEN METABOLIC PANEL: CPT | Performed by: EMERGENCY MEDICINE

## 2024-02-25 PROCEDURE — 85025 COMPLETE CBC W/AUTO DIFF WBC: CPT | Performed by: EMERGENCY MEDICINE

## 2024-02-25 PROCEDURE — 96372 THER/PROPH/DIAG INJ SC/IM: CPT

## 2024-02-25 PROCEDURE — 63600175 PHARM REV CODE 636 W HCPCS: Performed by: EMERGENCY MEDICINE

## 2024-02-25 PROCEDURE — G0378 HOSPITAL OBSERVATION PER HR: HCPCS

## 2024-02-25 PROCEDURE — 63600175 PHARM REV CODE 636 W HCPCS

## 2024-02-25 RX ORDER — ERYTHROMYCIN 5 MG/G
OINTMENT OPHTHALMIC EVERY 8 HOURS
Status: DISCONTINUED | OUTPATIENT
Start: 2024-02-25 | End: 2024-02-25

## 2024-02-25 RX ORDER — SUCRALFATE 1 G/10ML
1 SUSPENSION ORAL
Status: DISCONTINUED | OUTPATIENT
Start: 2024-02-25 | End: 2024-02-28 | Stop reason: HOSPADM

## 2024-02-25 RX ORDER — PROCHLORPERAZINE EDISYLATE 5 MG/ML
5 INJECTION INTRAMUSCULAR; INTRAVENOUS EVERY 6 HOURS PRN
Status: DISCONTINUED | OUTPATIENT
Start: 2024-02-25 | End: 2024-02-28 | Stop reason: HOSPADM

## 2024-02-25 RX ORDER — SIMETHICONE 80 MG
1 TABLET,CHEWABLE ORAL 4 TIMES DAILY PRN
Status: DISCONTINUED | OUTPATIENT
Start: 2024-02-25 | End: 2024-02-28 | Stop reason: HOSPADM

## 2024-02-25 RX ORDER — INSULIN ASPART 100 [IU]/ML
5 INJECTION, SOLUTION INTRAVENOUS; SUBCUTANEOUS
Status: DISCONTINUED | OUTPATIENT
Start: 2024-02-25 | End: 2024-02-26

## 2024-02-25 RX ORDER — HYDROMORPHONE HYDROCHLORIDE 1 MG/ML
0.5 INJECTION, SOLUTION INTRAMUSCULAR; INTRAVENOUS; SUBCUTANEOUS ONCE
Status: COMPLETED | OUTPATIENT
Start: 2024-02-25 | End: 2024-02-25

## 2024-02-25 RX ORDER — POLYETHYLENE GLYCOL 3350 17 G/17G
17 POWDER, FOR SOLUTION ORAL DAILY
Status: DISCONTINUED | OUTPATIENT
Start: 2024-02-25 | End: 2024-02-28 | Stop reason: HOSPADM

## 2024-02-25 RX ORDER — INSULIN ASPART 100 [IU]/ML
0-5 INJECTION, SOLUTION INTRAVENOUS; SUBCUTANEOUS
Status: DISCONTINUED | OUTPATIENT
Start: 2024-02-25 | End: 2024-02-28 | Stop reason: HOSPADM

## 2024-02-25 RX ORDER — FLUOXETINE HYDROCHLORIDE 20 MG/1
20 CAPSULE ORAL DAILY
Status: DISCONTINUED | OUTPATIENT
Start: 2024-02-25 | End: 2024-02-28 | Stop reason: HOSPADM

## 2024-02-25 RX ORDER — ALUMINUM HYDROXIDE, MAGNESIUM HYDROXIDE, AND SIMETHICONE 1200; 120; 1200 MG/30ML; MG/30ML; MG/30ML
30 SUSPENSION ORAL 4 TIMES DAILY PRN
Status: DISCONTINUED | OUTPATIENT
Start: 2024-02-25 | End: 2024-02-28 | Stop reason: HOSPADM

## 2024-02-25 RX ORDER — ISOSORBIDE MONONITRATE 30 MG/1
30 TABLET, EXTENDED RELEASE ORAL DAILY
Status: DISCONTINUED | OUTPATIENT
Start: 2024-02-25 | End: 2024-02-28 | Stop reason: HOSPADM

## 2024-02-25 RX ORDER — TIMOLOL MALEATE 5 MG/ML
1 SOLUTION/ DROPS OPHTHALMIC 2 TIMES DAILY
Status: DISCONTINUED | OUTPATIENT
Start: 2024-02-25 | End: 2024-02-25

## 2024-02-25 RX ORDER — IBUPROFEN 200 MG
16 TABLET ORAL
Status: DISCONTINUED | OUTPATIENT
Start: 2024-02-25 | End: 2024-02-28 | Stop reason: HOSPADM

## 2024-02-25 RX ORDER — SODIUM CHLORIDE 0.9 % (FLUSH) 0.9 %
10 SYRINGE (ML) INJECTION EVERY 12 HOURS PRN
Status: DISCONTINUED | OUTPATIENT
Start: 2024-02-25 | End: 2024-02-28 | Stop reason: HOSPADM

## 2024-02-25 RX ORDER — ACETAMINOPHEN 325 MG/1
650 TABLET ORAL EVERY 8 HOURS PRN
Status: DISCONTINUED | OUTPATIENT
Start: 2024-02-25 | End: 2024-02-26

## 2024-02-25 RX ORDER — HYDROMORPHONE HYDROCHLORIDE 1 MG/ML
1 INJECTION, SOLUTION INTRAMUSCULAR; INTRAVENOUS; SUBCUTANEOUS EVERY 4 HOURS PRN
Status: DISCONTINUED | OUTPATIENT
Start: 2024-02-25 | End: 2024-02-28 | Stop reason: HOSPADM

## 2024-02-25 RX ORDER — CETIRIZINE HYDROCHLORIDE 10 MG/1
10 TABLET ORAL DAILY
Status: DISCONTINUED | OUTPATIENT
Start: 2024-02-25 | End: 2024-02-28 | Stop reason: HOSPADM

## 2024-02-25 RX ORDER — DORZOLAMIDE HCL 20 MG/ML
1 SOLUTION/ DROPS OPHTHALMIC 2 TIMES DAILY
Status: DISCONTINUED | OUTPATIENT
Start: 2024-02-25 | End: 2024-02-25

## 2024-02-25 RX ORDER — TIMOLOL MALEATE 5 MG/ML
1 SOLUTION/ DROPS OPHTHALMIC 2 TIMES DAILY
Status: DISCONTINUED | OUTPATIENT
Start: 2024-02-25 | End: 2024-02-27

## 2024-02-25 RX ORDER — SEVELAMER CARBONATE 800 MG/1
800 TABLET, FILM COATED ORAL
Status: DISCONTINUED | OUTPATIENT
Start: 2024-02-25 | End: 2024-02-28 | Stop reason: HOSPADM

## 2024-02-25 RX ORDER — DORZOLAMIDE HCL 20 MG/ML
1 SOLUTION/ DROPS OPHTHALMIC 2 TIMES DAILY
Status: DISCONTINUED | OUTPATIENT
Start: 2024-02-25 | End: 2024-02-27

## 2024-02-25 RX ORDER — AMLODIPINE BESYLATE 5 MG/1
5 TABLET ORAL DAILY
Status: DISCONTINUED | OUTPATIENT
Start: 2024-02-25 | End: 2024-02-28 | Stop reason: HOSPADM

## 2024-02-25 RX ORDER — CARVEDILOL 25 MG/1
25 TABLET ORAL 2 TIMES DAILY
Status: DISCONTINUED | OUTPATIENT
Start: 2024-02-25 | End: 2024-02-28 | Stop reason: HOSPADM

## 2024-02-25 RX ORDER — GLUCAGON 1 MG
1 KIT INJECTION
Status: DISCONTINUED | OUTPATIENT
Start: 2024-02-25 | End: 2024-02-28 | Stop reason: HOSPADM

## 2024-02-25 RX ORDER — ACETAMINOPHEN 325 MG/1
650 TABLET ORAL EVERY 4 HOURS PRN
Status: DISCONTINUED | OUTPATIENT
Start: 2024-02-25 | End: 2024-02-26

## 2024-02-25 RX ORDER — SEVELAMER CARBONATE 800 MG/1
1600 TABLET, FILM COATED ORAL
Status: DISCONTINUED | OUTPATIENT
Start: 2024-02-25 | End: 2024-02-25

## 2024-02-25 RX ORDER — KETOROLAC TROMETHAMINE 5 MG/ML
1 SOLUTION OPHTHALMIC 4 TIMES DAILY
Status: DISCONTINUED | OUTPATIENT
Start: 2024-02-25 | End: 2024-02-26

## 2024-02-25 RX ORDER — NALOXONE HCL 0.4 MG/ML
0.02 VIAL (ML) INJECTION
Status: DISCONTINUED | OUTPATIENT
Start: 2024-02-25 | End: 2024-02-28 | Stop reason: HOSPADM

## 2024-02-25 RX ORDER — OXYCODONE HYDROCHLORIDE 5 MG/1
5 TABLET ORAL
Status: DISCONTINUED | OUTPATIENT
Start: 2024-02-25 | End: 2024-02-25

## 2024-02-25 RX ORDER — BUSPIRONE HYDROCHLORIDE 10 MG/1
10 TABLET ORAL 3 TIMES DAILY PRN
Status: DISCONTINUED | OUTPATIENT
Start: 2024-02-25 | End: 2024-02-28 | Stop reason: HOSPADM

## 2024-02-25 RX ORDER — BRIMONIDINE TARTRATE 1.5 MG/ML
1 SOLUTION/ DROPS OPHTHALMIC 3 TIMES DAILY
Status: DISCONTINUED | OUTPATIENT
Start: 2024-02-25 | End: 2024-02-27

## 2024-02-25 RX ORDER — ONDANSETRON 8 MG/1
8 TABLET, ORALLY DISINTEGRATING ORAL EVERY 8 HOURS PRN
Status: DISCONTINUED | OUTPATIENT
Start: 2024-02-25 | End: 2024-02-28 | Stop reason: HOSPADM

## 2024-02-25 RX ORDER — ERYTHROMYCIN 5 MG/G
OINTMENT OPHTHALMIC EVERY 8 HOURS
Status: DISPENSED | OUTPATIENT
Start: 2024-02-25 | End: 2024-02-27

## 2024-02-25 RX ORDER — ERYTHROMYCIN 5 MG/G
OINTMENT OPHTHALMIC 3 TIMES DAILY
Status: DISCONTINUED | OUTPATIENT
Start: 2024-02-25 | End: 2024-02-25

## 2024-02-25 RX ORDER — LEVETIRACETAM 500 MG/1
500 TABLET ORAL 2 TIMES DAILY
Status: DISCONTINUED | OUTPATIENT
Start: 2024-02-25 | End: 2024-02-28 | Stop reason: HOSPADM

## 2024-02-25 RX ORDER — BRINZOLAMIDE 10 MG/ML
1 SUSPENSION/ DROPS OPHTHALMIC 3 TIMES DAILY
Status: DISCONTINUED | OUTPATIENT
Start: 2024-02-25 | End: 2024-02-25

## 2024-02-25 RX ORDER — TALC
6 POWDER (GRAM) TOPICAL NIGHTLY PRN
Status: DISCONTINUED | OUTPATIENT
Start: 2024-02-25 | End: 2024-02-28 | Stop reason: HOSPADM

## 2024-02-25 RX ORDER — OXYCODONE HYDROCHLORIDE 5 MG/1
5 TABLET ORAL EVERY 6 HOURS PRN
Status: DISCONTINUED | OUTPATIENT
Start: 2024-02-25 | End: 2024-02-26

## 2024-02-25 RX ORDER — CIPROFLOXACIN HYDROCHLORIDE 3 MG/ML
1 SOLUTION/ DROPS OPHTHALMIC 4 TIMES DAILY
Status: DISCONTINUED | OUTPATIENT
Start: 2024-02-25 | End: 2024-02-25

## 2024-02-25 RX ORDER — FENTANYL CITRATE 50 UG/ML
50 INJECTION, SOLUTION INTRAMUSCULAR; INTRAVENOUS
Status: COMPLETED | OUTPATIENT
Start: 2024-02-25 | End: 2024-02-25

## 2024-02-25 RX ORDER — IBUPROFEN 200 MG
24 TABLET ORAL
Status: DISCONTINUED | OUTPATIENT
Start: 2024-02-25 | End: 2024-02-28 | Stop reason: HOSPADM

## 2024-02-25 RX ORDER — BRIMONIDINE TARTRATE 1.5 MG/ML
1 SOLUTION/ DROPS OPHTHALMIC 3 TIMES DAILY
Status: DISCONTINUED | OUTPATIENT
Start: 2024-02-25 | End: 2024-02-25

## 2024-02-25 RX ORDER — GABAPENTIN 300 MG/1
600 CAPSULE ORAL 2 TIMES DAILY
Status: DISCONTINUED | OUTPATIENT
Start: 2024-02-25 | End: 2024-02-28 | Stop reason: HOSPADM

## 2024-02-25 RX ORDER — HYDRALAZINE HYDROCHLORIDE 50 MG/1
100 TABLET, FILM COATED ORAL 2 TIMES DAILY
Status: DISCONTINUED | OUTPATIENT
Start: 2024-02-25 | End: 2024-02-28 | Stop reason: HOSPADM

## 2024-02-25 RX ORDER — PREDNISOLONE ACETATE 10 MG/ML
1 SUSPENSION/ DROPS OPHTHALMIC 4 TIMES DAILY
Status: DISCONTINUED | OUTPATIENT
Start: 2024-02-25 | End: 2024-02-25

## 2024-02-25 RX ORDER — FLUTICASONE PROPIONATE 50 MCG
1 SPRAY, SUSPENSION (ML) NASAL DAILY
Status: DISCONTINUED | OUTPATIENT
Start: 2024-02-25 | End: 2024-02-28 | Stop reason: HOSPADM

## 2024-02-25 RX ORDER — METHAZOLAMIDE 50 MG/1
100 TABLET ORAL 3 TIMES DAILY
Status: DISCONTINUED | OUTPATIENT
Start: 2024-02-25 | End: 2024-02-28 | Stop reason: HOSPADM

## 2024-02-25 RX ADMIN — LEVETIRACETAM 500 MG: 500 TABLET, FILM COATED ORAL at 09:02

## 2024-02-25 RX ADMIN — KETOROLAC TROMETHAMINE 1 DROP: 5 SOLUTION/ DROPS OPHTHALMIC at 04:02

## 2024-02-25 RX ADMIN — KETOROLAC TROMETHAMINE 1 DROP: 5 SOLUTION/ DROPS OPHTHALMIC at 09:02

## 2024-02-25 RX ADMIN — INSULIN ASPART 5 UNITS: 100 INJECTION, SOLUTION INTRAVENOUS; SUBCUTANEOUS at 12:02

## 2024-02-25 RX ADMIN — SUCRALFATE 1 G: 1 SUSPENSION ORAL at 04:02

## 2024-02-25 RX ADMIN — TIMOLOL MALEATE 1 DROP: 5 SOLUTION OPHTHALMIC at 09:02

## 2024-02-25 RX ADMIN — SEVELAMER CARBONATE 800 MG: 800 TABLET, FILM COATED ORAL at 09:02

## 2024-02-25 RX ADMIN — CARVEDILOL 25 MG: 12.5 TABLET, FILM COATED ORAL at 09:02

## 2024-02-25 RX ADMIN — HYDRALAZINE HYDROCHLORIDE 100 MG: 50 TABLET ORAL at 09:02

## 2024-02-25 RX ADMIN — HYDROMORPHONE HYDROCHLORIDE 1 MG: 1 INJECTION, SOLUTION INTRAMUSCULAR; INTRAVENOUS; SUBCUTANEOUS at 10:02

## 2024-02-25 RX ADMIN — BRIMONIDINE TARTRATE 1 DROP: 1.5 SOLUTION/ DROPS OPHTHALMIC at 04:02

## 2024-02-25 RX ADMIN — NALXONE HYDROCHLORIDE 0.02 MG: 0.4 INJECTION INTRAMUSCULAR; INTRAVENOUS; SUBCUTANEOUS at 11:02

## 2024-02-25 RX ADMIN — DORZOLAMIDE HCL 1 DROP: 20 SOLUTION/ DROPS OPHTHALMIC at 09:02

## 2024-02-25 RX ADMIN — SUCRALFATE 1 G: 1 SUSPENSION ORAL at 09:02

## 2024-02-25 RX ADMIN — HYDROMORPHONE HYDROCHLORIDE 1 MG: 1 INJECTION, SOLUTION INTRAMUSCULAR; INTRAVENOUS; SUBCUTANEOUS at 04:02

## 2024-02-25 RX ADMIN — HYDROMORPHONE HYDROCHLORIDE 1 MG: 1 INJECTION, SOLUTION INTRAMUSCULAR; INTRAVENOUS; SUBCUTANEOUS at 05:02

## 2024-02-25 RX ADMIN — HYDROMORPHONE HYDROCHLORIDE 0.5 MG: 1 INJECTION, SOLUTION INTRAMUSCULAR; INTRAVENOUS; SUBCUTANEOUS at 06:02

## 2024-02-25 RX ADMIN — SEVELAMER CARBONATE 800 MG: 800 TABLET, FILM COATED ORAL at 12:02

## 2024-02-25 RX ADMIN — CETIRIZINE HYDROCHLORIDE 10 MG: 10 TABLET, FILM COATED ORAL at 09:02

## 2024-02-25 RX ADMIN — BRIMONIDINE TARTRATE 1 DROP: 1.5 SOLUTION/ DROPS OPHTHALMIC at 09:02

## 2024-02-25 RX ADMIN — AMLODIPINE BESYLATE 5 MG: 5 TABLET ORAL at 09:02

## 2024-02-25 RX ADMIN — POLYETHYLENE GLYCOL 3350 17 G: 17 POWDER, FOR SOLUTION ORAL at 09:02

## 2024-02-25 RX ADMIN — ERYTHROMYCIN: 5 OINTMENT OPHTHALMIC at 09:02

## 2024-02-25 RX ADMIN — SEVELAMER CARBONATE 800 MG: 800 TABLET, FILM COATED ORAL at 04:02

## 2024-02-25 RX ADMIN — METHAZOLAMIDE 100 MG: 50 TABLET ORAL at 09:02

## 2024-02-25 RX ADMIN — ISOSORBIDE MONONITRATE 30 MG: 30 TABLET, EXTENDED RELEASE ORAL at 09:02

## 2024-02-25 RX ADMIN — APIXABAN 5 MG: 5 TABLET, FILM COATED ORAL at 09:02

## 2024-02-25 RX ADMIN — FENTANYL CITRATE 50 MCG: 50 INJECTION INTRAMUSCULAR; INTRAVENOUS at 02:02

## 2024-02-25 RX ADMIN — GABAPENTIN 600 MG: 300 CAPSULE ORAL at 09:02

## 2024-02-25 RX ADMIN — OXYCODONE 5 MG: 5 TABLET ORAL at 10:02

## 2024-02-25 RX ADMIN — FLUOXETINE HYDROCHLORIDE 20 MG: 20 CAPSULE ORAL at 09:02

## 2024-02-25 RX ADMIN — Medication 6 MG: at 10:02

## 2024-02-25 RX ADMIN — INSULIN DETEMIR 10 UNITS: 100 INJECTION, SOLUTION SUBCUTANEOUS at 09:02

## 2024-02-25 RX ADMIN — ONDANSETRON 8 MG: 8 TABLET, ORALLY DISINTEGRATING ORAL at 10:02

## 2024-02-25 NOTE — HPI
Patient is a 34F with HTN, T2DM c/b gastroparesis, sickle cell trait, SVT, and ESRD on HD (TTS), here for severe pain following retinal detachment surgery 2 weeks ago. Patient transferred from [facility] for an emergent Ophthalmology consult. Reportedly, the patient is approximately 2 weeks status post left eye surgery for retinal detachment performed at [facility]. Did complete follow-up with her glaucoma specialist, had progressive eye pain that was initially managed with eye drops, however progressed to an intolerable pain level for which she sought care at the ED. Associated decreased L visual acuity. Underwent ophthalmology eval on 2/24, had acute glaucoma with increased IOP 80. IOP on presentation 20//79 improved to 13 after AC tap. IV Diamox given for IOP, Dorzolamide, brimonidine, and timolol given for IOP. Continued to have pain despite intervention and reduction of IOP.     In ED, /110, no hypoxia (on 2L NC initially, however no desaturation recorded), afebrile, no tachycardia. Received fentanyl for pain control. Underwent AC tap with reduction in IOP. CBC notable for thrombocytopenia (PLT 99), no leukocytosis, stable anemia (Hb 10.7). CMP c/w ESRD, no severe lyte abnormalities. Hyperglycemic to 295. Procal elevated 0.5, troponin normal.

## 2024-02-25 NOTE — PHARMACY MED REC
"  Admission Medication History     The home medication history was taken by Elsa Byrne.    You may go to "Admission" then "Reconcile Home Medications" tabs to review and/or act upon these items.     The home medication list has been updated by the Pharmacy department.   Please read ALL comments highlighted in yellow.   Please address this information as you see fit.    Feel free to contact us if you have any questions or require assistance.    Medications listed below were obtained from: Patient/family and Analytic software- [employer] Medications   Medication Sig    acetaZOLAMIDE (DIAMOX) 250 MG tablet   1 tablet 3 times a day for 30 days    amLODIPine (NORVASC) 5 MG tablet    Take 5 mg by mouth once daily.    apixaban (ELIQUIS) 5 mg Tab Take 1 tablet (5 mg total) by mouth 2 (two) times daily.      brinzolamide (AZOPT) 1 % ophthalmic suspension   1 drop 3 times a day for 30 days Left eye    busPIRone (BUSPAR) 10 MG tablet Take 1 tablet by mouth 3 times a day by oral route as needed, for panic/anxiety.      carvediloL (COREG) 25 MG tablet   Take 1 tablet twice a day by oral route for 30 days.    cetirizine (ZYRTEC) 10 MG tablet   Take 1 tablet every day by oral route.    ciprofloxacin HCl (CILOXAN) 0.3 % ophthalmic solution   Place 1 drop into both eyes 4 (four) times daily.)    FLUoxetine 20 MG capsule Take 1 capsule every day by oral route for 90 days.      fluticasone propionate (FLONASE ALLERGY RELIEF) 50 mcg/actuation nasal spray   Spray 1 spray in Each Nostril route Daily.)    gabapentin (NEURONTIN) 300 MG capsule   : Take 600 mg by mouth 2 (two) times daily.)    hydrALAZINE (APRESOLINE) 100 MG tablet   Take 1 tablet (100 mg total) by mouth 2 (two) times daily.    insulin aspart U-100 (NOVOLOG FLEXPEN U-100 INSULIN) 100 unit/mL (3 mL) InPn pen    Inject 15 Units into the skin 3 (three) times daily before meals.)    insulin detemir U-100, Levemir, (LEVEMIR FLEXTOUCH U100 INSULIN) 100 unit/mL (3 mL) InPn " "pen   Inject 21 Units into the skin every evening.)    isosorbide mononitrate (IMDUR) 30 MG 24 hr tablet   Take 1 tablet (30 mg total) by mouth once daily.    ketorolac 0.5% (ACULAR) 0.5 % Drop   Place 1 drop into both eyes 4 (four) times daily.)    levETIRAcetam (KEPPRA) 500 MG Tab   Take 500 mg by mouth 2 (two) times daily.)    ondansetron (ZOFRAN-ODT) 4 MG TbDL   Place 1 tablet (4 mg) on or under the tongue every 8 hours for 10 days.    pantoprazole (PROTONIX) 40 MG tablet   Take 1 tablet every day by oral route for 90 days.    prednisoLONE acetate (PRED FORTE) 1 % DrpS    Place 1 drop into both eyes 4 (four) times daily.)    sevelamer carbonate (RENVELA) 800 mg Tab   Take 1,600 mg by mouth 3 (three) times daily with meals.)    sucralfate (CARAFATE) 100 mg/mL suspension   Take 1 g by mouth 4 (four) times daily with meals and nightly.)    sucroferric oxyhydroxide (VELPHORO) 500 mg Chew    Take 1 tablet by mouth 3 (three) times daily.)    timolol maleate 0.5% (TIMOPTIC) 0.5 % Drop    Place 1 drop into both eyes 2 (two) times daily.)    blood sugar diagnostic (TRUE METRIX GLUCOSE TEST STRIP) Strp   Use 1 strip to check blood glucose three times daily    blood-glucose meter (TRUE METRIX GLUCOSE METER) Misc   Use to check blood glucose    insulin (LANTUS SOLOSTAR U-100 INSULIN) glargine 100 units/mL SubQ pen   Inject 21 Units into the skin once daily.    lancets (TRUEPLUS LANCETS) 33 gauge Misc   Use 1 to check blood glucose three times daily    lancets 33 gauge Misc   Use to test twice daily    oxyCODONE-acetaminophen (PERCOCET) 5-325 mg per tablet   Take 1 tablet by mouth every 6 (six) hours as needed for pain.   (Patient not taking: Reported on 2/25/2024)    pen needle, diabetic 31 gauge x 3/16" Ndle   Use as directed to inject insulin 5 times daily    promethazine (PHENERGAN) 25 MG tablet Take 1 tablet (25 mg total) by mouth every 6 (six) hours as needed for 5 days.   (Patient not taking: Reported on 2/25/2024) "     Potential issues to be addressed PRIOR TO DISCHARGE  Patient reported not taking the following medications: (OxyCODONE-acetaminophen (PERCOCET) 5-325 mg per tablet). These medications remain on the home medication list. Please address accordingly.     Elsa Byrne  EXT [phone]               .

## 2024-02-25 NOTE — PROGRESS NOTES
"Consultation Report  Ophthalmology Service    Date: 02/25/2024    Reason for Consult: "left eye pain"     History of Present Illness: Tabby Howard is a 34 y.o. female with history PDR/Vit heme [place] [facility] [place] for 1 week. Seen by outside ophthalmologist who did retnia surgery back in January for her. This Dr. Tried to get her scheduled for glaucoma surgery next week. PT reports her pain is unbearable. Ophthalmology is being consulted to evaluate.     Interval History: PT talking with daughter on phone on initial eval and wanted a minute before exam. PT sleeping comfortably in bed on repeat exam. Somewhat difficult to arouse, but was arousal. Given low respiratory rate, RN gave narcan IV. PT remained comfortable resting in bed.     Medications this encounter:    amLODIPine  5 mg Oral Daily    apixaban  5 mg Oral BID    brimonidine 0.15 % OPTH DROP  1 drop Left Eye TID    carvediloL  25 mg Oral BID    cetirizine  10 mg Oral Daily    dorzolamide  1 drop Left Eye BID    erythromycin   Left Eye Q8H    FLUoxetine  20 mg Oral Daily    fluticasone propionate  1 spray Each Nostril Daily    gabapentin  600 mg Oral BID    hydrALAZINE  100 mg Oral BID    insulin aspart U-100  5 Units Subcutaneous TIDWM    insulin detemir U-100  10 Units Subcutaneous QHS    isosorbide mononitrate  30 mg Oral Daily    ketorolac 0.5%  1 drop Both Eyes QID    levETIRAcetam  500 mg Oral BID    methazoLAMIDE  100 mg Oral TID    polyethylene glycol  17 g Oral Daily    sevelamer carbonate  800 mg Oral TID WM    sucralfate  1 g Oral QID (WM & HS)    timolol maleate 0.5%  1 drop Left Eye BID     Ocular examination/Dilated fundus examination:  Base Eye Exam       Visual Acuity (Snellen - Linear)         Right Left    Dist sc  LP vs nlp    Dist ph sc 20/70               Tonometry (Tonopen, 7:39 PM)         Right Left    Pressure 20 79              Tonometry #2 (Applanation, 10:36 PM)         Right Left    Pressure  74      "         Tonometry #3 (Applanation, 10:36 PM)         Right Left    Pressure  13              Tonometry Comments    IOP improved to 13 after AC tap.              Pupils         Dark Light Shape React    Right 4 4 Round Minimal    Left       No view OS. Unclear if pt is pharm dilated from recent retinal surgery.              Visual Fields         Right Left     Full     Restrictions  Total superior temporal, inferior temporal, superior nasal, inferior nasal deficiencies              Extraocular Movement    Exotropia. Limited by pain and effort              Neuro/Psych       Oriented x3: Yes    Mood/Affect: Normal              Dilation       Right eye: 1% Tropicamide, 2.5% Phenylephrine, 2% Cyclopentolate @ 7:39 PM                  Slit Lamp and Fundus Exam       External Exam         Right Left    External Normal Normal              Slit Lamp Exam         Right Left    Lids/Lashes Normal Normal    Conjunctiva/Sclera White and quiet injection and chemosis    Cornea Clear MCE difusely    Anterior Chamber Deep and formed total hyphema    Iris round, minimally reactive no view    Lens 1+ Cortical cataract no view    Anterior Vitreous Normal no view              Fundus Exam         Right Left    Disc Normal unable to assess    C/D Ratio  unable to assess    Macula Normal unable to assess    Vessels Normal unable to assess    Periphery poor view 2/2 patient compliiance. Some laser scars inferiorly. unable to assess                      Assessment/Plan:     1. Hyphema OS with elevated IOP likely 2/2 NVG  - VA 20/70 // LP or worse.   - IOP on presentation 20//79 improved to 13 after AC tap  - Today 2/25/24: IOP 44, PT resting comfortably in bed.    - on Cosopt and Brimonidine  - + Sickle cell Trait    Recommendations:   - Continue drops:    Timolol/Dorzolomide (Cosopt) BID OS   Brimonidine TID OS  - Wait 5-10 min in between drops.   - Start Methazolamide 100mg TID to try to reduce IOP further   - Very possible that IOP will  increase as aqueous re-accumulates    - RD/return precautions discussed   - Please call the on call resident with any questions of worsening exam findings.      Patient's Best Contact Number: [phone]     Patient discussed with Dr. Pierre and Dr. Jarrod Leach MD   [employer] Ophthalmology PGY2  02/25/2024  7:47 PM    Common Ophthalmologic Abbreviations  OD: right eye  OS: left eye  OU: both eyes  IOP: intraocular pressure  VA: visual acuity  PH: pinhole  HM: hand motion  LP: light perception  NLP: no light perception  DFE: dilated fundus examination  SLE: slit lamp examination  RD: retinal detachment   AT: artificial tears  PFAT: preservative free artificial tears

## 2024-02-25 NOTE — ED PROVIDER NOTES
"Encounter Date: 2/24/2024       History     Chief Complaint   Patient presents with    retinal detachment     Retinal detachment surgery 2 weeks reports pain 10/10. Redness  and swelling to the left eye. Transferred from [facility] for opthalmology consult. Hx diabetes      The patient is a 34-year-old female.  She has a documented past medical history significant for malignant hypertension, diabetes, gastroparesis, SVT, and ESRD on HD.  She is an ER to ER transfer sent here from [facility] for an emergent Ophthalmology consult.  Reportedly, the patient is approximately 2 weeks status post left eye surgery for retinal detachment performed at [facility].  She checked into the ER at [facility] earlier today complaining of intolerable left eye pain.  She states that this pain has been present for the past week.  She states that she did have a follow up with her ophthalmologist earlier this week and was told that her intra-ocular pressure was high.  She was given an additional eyedrop medication and was referred to see a glaucoma specialist, whom she has an appoint with on Tuesday.  Despite using drops her pain has remained intolerable prompting her ER visit today.    Except from transferring ER physician:  "34-year-old female presents emergency department left eye pain status post eye surgery for retinal detachment she states 1-1/2-2 weeks ago.  Patient eye exam as noted above.  Patient with poor visual acuity left eye.  Patient with pain.  Patient has been given pain control in the emergency department.  Patient still with eye pain.  Uncertain what her intra-ocular pressure is.  Glaucoma high on the differential.  I did order timolol eyedrops to give p.o. see his all amide.  Did attempted to control the patient's pain.  She is given nausea medicine and pain medicine.  I discussed the case with Dr. Pierre at [facility] who states that he will evaluate the patient " "in the emergency room.  He recommends Acetazeolamide and timolol prior to transfer."    Additionally, the patient has significantly elevated blood pressure reading had arrival.  She states that she did complete dialysis earlier today.  She states that she has not taken her evening blood pressure medications.  She states that her left eye pain remains at 10/10.  No additional symptoms or concerns.              Review of patient's allergies indicates:   Allergen Reactions    Penicillins Hives     Past Medical History:   Diagnosis Date    ESRD on hemodialysis 2022    Gastritis 2022    EGD was 21    Gastroparesis 2022    has not had Emptying study    Heart failure with preserved ejection fraction 2022    EF 55% on 3/22    History of supraventricular tachycardia     Hyperkalemia 2022    Hypertensive emergency 2022    Sickle cell trait 2022    Type 2 diabetes mellitus      Past Surgical History:   Procedure Laterality Date     SECTION      x 3    COLONOSCOPY      COLONOSCOPY N/A 2022    Procedure: COLONOSCOPY;  Surgeon: Jagdeep Cedeno MD;  Location: [facility];  Service: Endoscopy;  Laterality: N/A;    ENDOSCOPIC ULTRASOUND OF UPPER GASTROINTESTINAL TRACT N/A 2023    Procedure: ULTRASOUND, UPPER GI TRACT, ENDOSCOPIC;  Surgeon: Micky Paredes III, MD;  Location: [facility];  Service: Endoscopy;  Laterality: N/A;    ESOPHAGOGASTRODUODENOSCOPY N/A 10/18/2019    Procedure: ESOPHAGOGASTRODUODENOSCOPY (EGD);  Surgeon: Gianluca Mendez MD;  Location: [facility];  Service: Endoscopy;  Laterality: N/A;    ESOPHAGOGASTRODUODENOSCOPY N/A 2022    Procedure: EGD (ESOPHAGOGASTRODUODENOSCOPY);  Surgeon: Micky Paredes III, MD;  Location: [facility];  Service: Endoscopy;  Laterality: N/A;    ESOPHAGOGASTRODUODENOSCOPY N/A 2022    Procedure: EGD (ESOPHAGOGASTRODUODENOSCOPY);  Surgeon: Marcelo Zhong MD;  Location: [facility];  Service: Endoscopy;  " Laterality: N/A;    INSERTION, CATHETER, TUNNELED N/A 6/17/2023    Procedure: Insertion,catheter,tunneled;  Surgeon: Carlos Thurman Jr., MD;  Location: [facility] OR;  Service: General;  Laterality: N/A;    LAPAROSCOPIC CHOLECYSTECTOMY N/A 07/30/2022    Procedure: CHOLECYSTECTOMY, LAPAROSCOPIC;  Surgeon: Grey Perez MD;  Location: [facility] OR;  Service: General;  Laterality: N/A;    PLACEMENT OF DUAL-LUMEN VASCULAR CATHETER Left 07/12/2022    Procedure: INSERTION-CATHETER-JOSEPH;  Surgeon: Dionte Gan MD;  Location: [facility] OR;  Service: General;  Laterality: Left;    PLACEMENT OF DUAL-LUMEN VASCULAR CATHETER Right 07/26/2022    Procedure: INSERTION-CATHETER-Hemosplit;  Surgeon: Dionte Gan MD;  Location: [facility] OR;  Service: General;  Laterality: Right;     Family History   Problem Relation Age of Onset    Diabetes Mother     Diabetes Father      Social History     Tobacco Use    Smoking status: Never    Smokeless tobacco: Never   Substance Use Topics    Alcohol use: Not Currently    Drug use: No     Review of Systems   Constitutional:  Negative for chills and fever.   Eyes:  Positive for pain, redness and visual disturbance.   Respiratory:  Negative for shortness of breath.    Cardiovascular:  Negative for chest pain.   Gastrointestinal:  Negative for abdominal pain, nausea and vomiting.   Genitourinary:  Negative for pelvic pain.   Musculoskeletal:  Negative for back pain, gait problem and neck pain.   Skin:  Negative for color change and rash.   Neurological:  Negative for dizziness, syncope, facial asymmetry, speech difficulty, weakness, light-headedness, numbness and headaches.   Psychiatric/Behavioral:  Negative for confusion.        Physical Exam     Initial Vitals [02/24/24 1757]   BP Pulse Resp Temp SpO2   (!) 178/110 93 20 -- 99 %      MAP       --         Physical Exam    Nursing note and vitals reviewed.  Constitutional: She appears well-developed and well-nourished. She appears distressed.   HENT:    Head: Normocephalic.   Eyes:   Left eye: Patient guarded - keeping left eye closed - exam limited initially. Diffuse injection. Hyphema present. Pupil equal round and reactive. EOMI.    Neck: Neck supple.   Cardiovascular:  Normal rate and intact distal pulses.           PermCath to right upper chest.  AV fistula left upper extremity with palpable thrill.   Pulmonary/Chest: No respiratory distress.   Abdominal: Abdomen is soft. There is no abdominal tenderness. There is no rebound and no guarding.   Musculoskeletal:         General: Normal range of motion.      Cervical back: Neck supple.     Neurological: She is alert and oriented to person, place, and time. She has normal strength. No sensory deficit.   No facial droop.  No dysarthric speech.  Oriented appropriately.  5/5 strength and normal sensation extremities x4.   Skin: Skin is warm and dry.   Psychiatric: She has a normal mood and affect. Her behavior is normal.         ED Course   Procedures  Labs Reviewed   ISTAT PROCEDURE - Abnormal; Notable for the following components:       Result Value    POC Glucose 186 (*)     POC Creatinine 4.2 (*)     POC TCO2 (MEASURED) 31 (*)     POC Ionized Calcium 0.99 (*)     POC Hematocrit 31 (*)     All other components within normal limits   ISTAT CHEM8     EKG Readings: (Independently Interpreted)   Initial Reading: No STEMI. Rhythm: Normal Sinus Rhythm. Heart Rate: 96. Ectopy: No Ectopy. Axis: Left Axis Deviation.   ER attending physician reviewed and signed - no acute ischemia        Imaging Results    None          Medications   acetaZOLAMIDE (DIAMOX) 500 mg in dextrose 5 % (D5W) 50 mL (500 mg Intravenous New Bag 2/24/24 2100)   fluorescein ophthalmic strip 1 each (1 each Both Eyes Given 2/24/24 1842)   proparacaine 0.5 % ophthalmic solution 1 drop (1 drop Both Eyes Given 2/24/24 1842)   hydrALAZINE tablet 100 mg (100 mg Oral Given 2/24/24 1842)   oxyCODONE immediate release tablet 5 mg (5 mg Oral Given 2/24/24 1842)      Medical Decision Making  Risk  Prescription drug management.              Attending Attestation:     Physician Attestation Statement for NP/PA:   I personally made/approved the management plan and take responsibility for the patient management.    Other NP/PA Attestation Additions:    History of Present Illness: 34-year-old woman presents to the ED as a transfer further Ophthalmologic evaluation of severe left eye pain.                          Medical Decision Making:   Initial Assessment:   ER to ER transfer sent here from outside facility for emergent Ophthalmology evaluation.  Patient is 2 weeks status post left eye retinal detachment surgery and reports 10/10 pain to eye.  Transferring facility unable to establish accurate intra-ocular pressure.  Differential Diagnosis:   Postop pain, glaucoma, increased IOP, malignant hypertension, hypertensive urgency, hypertensive emergency, etc.  Clinical Tests:   Lab Tests: Ordered and Reviewed  Medical Tests: Ordered and Reviewed  ED Management:  Vital signs reviewed, blood pressure at 178/110-patient reports that she has not taken her p.m. dose of hydralazine 100 milligrams   Chart review was completed, notes from transferring ER were reviewed   Ophthalmology was paged-I spoke with the ophthalmologist who will see the patient in the ER  I-STAT was ordered  Instant release oxycodone was given for pain - hydralazine 100 milligrams was given for hypertension    Ophthalmologist evaluated patient in the ER - reports IOP of 80 - requested IV Diamox - discussed with ER pharmacist to ensure no renal dose requirement or contraindication -pt can receive 500 mg IV - given         Other:   I have discussed this case with another health care provider.  Medical complexity - High risk   I gave report and signed out pt to PM ER attending physician Dr Mijares who will assume all further care and management due to end of my shift and pending final recommendations by ophthalmology.              Clinical Impression:  Final diagnoses:  [H57.12] Left eye pain  [G89.18] Postoperative pain  [I16.0] Hypertensive urgency  [H21.02] Hyphema of left eye  [H40.052] Increased pressure in the eye, left  [N18.6, Z99.2] ESRD on hemodialysis  [H40.9] Acute glaucoma of left eye (Primary)          ED Disposition Condition    Observation Stable                Dylan Gomes, SALLY  02/24/24 5217

## 2024-02-25 NOTE — ASSESSMENT & PLAN NOTE
Creatine stable for now. BMP reviewed- noted Estimated Creatinine Clearance: 12.9 mL/min (A) (based on SCr of 4.8 mg/dL (H)). according to latest data. Based on current GFR, CKD stage is end stage.  Monitor UOP and serial BMP and adjust therapy as needed. Renally dose meds. Avoid nephrotoxic medications and procedures.    HD TTS, CTM CMP   Continue Sevelamer

## 2024-02-25 NOTE — H&P
[facility] - Emergency Dept  Hospital Medicine  History & Physical    Patient Name: Tabby Howard  MRN: [id number]  Patient Class: OP- Observation  Admission Date: 2/24/2024  Attending Physician: Christiano Nair III*   Primary Care Provider: Melony Kim NP         Patient information was obtained from patient, past medical records, and ER records.     Subjective:     Principal Problem:Acute glaucoma of left eye    Chief Complaint:   Chief Complaint   Patient presents with    retinal detachment     Retinal detachment surgery 2 weeks reports pain 10/10. Redness  and swelling to the left eye. Transferred from [facility] for opthalmology consult. Hx diabetes         HPI: Patient is a 34F with HTN, T2DM c/b gastroparesis, SVT, and ESRD on HD (TTS), here for severe pain following retinal detachment surgery 2 weeks ago. Patient transferred from [facility] for an emergent Ophthalmology consult. Reportedly, the patient is approximately 2 weeks status post left eye surgery for retinal detachment performed at [facility]. Did complete follow-up with her glaucoma specialist, had progressive eye pain that was initially managed with eye drops, however progressed to an intolerable pain level for which she sought care at the ED. Associated decreased L visual acuity. Underwent ophthalmology eval on 2/24, had acute glaucoma with increased IOP 80. IOP on presentation 20//79 improved to 13 after AC tap. IV Diamox given for IOP, Dorzolamide, brimonidine, and timolol given for IOP. Continued to have pain despite intervention and reduction of IOP.     In ED, /110, no hypoxia (on 2L NC initially, however no desaturation recorded), afebrile, no tachycardia. Received fentanyl for pain control. Underwent AC tap with reduction in IOP. CBC notable for thrombocytopenia (PLT 99), no leukocytosis, stable anemia (Hb 10.7). CMP c/w ESRD, no severe lyte abnormalities. Hyperglycemic to 295.  Procal elevated 0.5, troponin normal.     Past Medical History:   Diagnosis Date    ESRD on hemodialysis 2022    Gastritis 2022    EGD was 22    Gastroparesis 2022    has not had Emptying study    Heart failure with preserved ejection fraction 2022    EF 55% on 3/22    History of supraventricular tachycardia     Hyperkalemia 2022    Hypertensive emergency 2022    Sickle cell trait 2022    Type 2 diabetes mellitus        Past Surgical History:   Procedure Laterality Date     SECTION      x 3    COLONOSCOPY      COLONOSCOPY N/A 2022    Procedure: COLONOSCOPY;  Surgeon: Jagdeep Cedeno MD;  Location: [facility];  Service: Endoscopy;  Laterality: N/A;    ENDOSCOPIC ULTRASOUND OF UPPER GASTROINTESTINAL TRACT N/A 2023    Procedure: ULTRASOUND, UPPER GI TRACT, ENDOSCOPIC;  Surgeon: Micky Paredes III, MD;  Location: [facility];  Service: Endoscopy;  Laterality: N/A;    ESOPHAGOGASTRODUODENOSCOPY N/A 10/18/2019    Procedure: ESOPHAGOGASTRODUODENOSCOPY (EGD);  Surgeon: Gianluca Mendez MD;  Location: [facility];  Service: Endoscopy;  Laterality: N/A;    ESOPHAGOGASTRODUODENOSCOPY N/A 2022    Procedure: EGD (ESOPHAGOGASTRODUODENOSCOPY);  Surgeon: Micky Paredes III, MD;  Location: [facility];  Service: Endoscopy;  Laterality: N/A;    ESOPHAGOGASTRODUODENOSCOPY N/A 2022    Procedure: EGD (ESOPHAGOGASTRODUODENOSCOPY);  Surgeon: Marcelo Zhong MD;  Location: [facility] ENDO;  Service: Endoscopy;  Laterality: N/A;    INSERTION, CATHETER, TUNNELED N/A 2023    Procedure: Insertion,catheter,tunneled;  Surgeon: Carlos Thurman Jr., MD;  Location: [facility] OR;  Service: General;  Laterality: N/A;    LAPAROSCOPIC CHOLECYSTECTOMY N/A 2022    Procedure: CHOLECYSTECTOMY, LAPAROSCOPIC;  Surgeon: Grey Perez MD;  Location: [facility] OR;  Service: General;  Laterality: N/A;    PLACEMENT OF DUAL-LUMEN VASCULAR CATHETER Left 2022    Procedure:  INSERTION-CATHETER-JOSEPH;  Surgeon: Dionte Gan MD;  Location: [facility] OR;  Service: General;  Laterality: Left;    PLACEMENT OF DUAL-LUMEN VASCULAR CATHETER Right 07/26/2022    Procedure: INSERTION-CATHETER-Hemosplit;  Surgeon: Dionte Gan MD;  Location: [facility] OR;  Service: General;  Laterality: Right;       Review of patient's allergies indicates:   Allergen Reactions    Penicillins Hives       Current Facility-Administered Medications on File Prior to Encounter   Medication    [COMPLETED] acetaZOLAMIDE tablet 500 mg    [COMPLETED] HYDROcodone-acetaminophen  mg per tablet 1 tablet    [COMPLETED] ondansetron disintegrating tablet 4 mg    [COMPLETED] TETRAcaine HCl (PF) 0.5 % Drop 2 drop    [DISCONTINUED] timolol maleate 0.5% ophthalmic solution 1 drop     Current Outpatient Medications on File Prior to Encounter   Medication Sig    acetaZOLAMIDE (DIAMOX) 250 MG tablet 1 tablet 3 times a day for 30 days    amLODIPine (NORVASC) 5 MG tablet Take 1 tablet (5 mg total) by mouth once daily. (Patient taking differently: Take 5 mg by mouth once daily.)    amLODIPine (NORVASC) 5 MG tablet Take 1 tablet every day by oral route for 90 days.    apixaban (ELIQUIS) 5 mg Tab Take 1 tablet (5 mg total) by mouth 2 (two) times daily.    apixaban (ELIQUIS) 5 mg Tab Take 1 tablet twice a day by oral route for 90 days.    blood sugar diagnostic (TRUE METRIX GLUCOSE TEST STRIP) Strp Use 1 strip to check blood glucose three times daily    blood-glucose meter (TRUE METRIX GLUCOSE METER) Misc Use to check blood glucose    brinzolamide (AZOPT) 1 % ophthalmic suspension 1 drop 3 times a day for 30 days Left eye    busPIRone (BUSPAR) 10 MG tablet Take 1 tablet by mouth 3 times a day by oral route as needed, for panic/anxiety.    carvediloL (COREG) 25 MG tablet Take 1 tablet (25 mg total) by mouth 2 (two) times daily.    carvediloL (COREG) 25 MG tablet Take 1 tablet twice a day by oral route for 30 days.    cetirizine (ZYRTEC) 10  MG tablet Take 1 tablet (10 mg total) by mouth once daily.    cetirizine (ZYRTEC) 10 MG tablet Take 1 tablet every day by oral route.    ciprofloxacin HCl (CILOXAN) 0.3 % ophthalmic solution Instill 1 drop into the left eye 4 times a day for 30 days (Patient taking differently: Place 1 drop into both eyes 4 (four) times daily.)    FLUoxetine 20 MG capsule Take 1 capsule every day by oral route for 90 days.    fluticasone propionate (FLONASE ALLERGY RELIEF) 50 mcg/actuation nasal spray [place] 1 spray every day by intranasal route. (Patient taking differently: 1 spray by Each Nostril route Daily.)    fluticasone propionate (FLONASE) 50 mcg/actuation nasal spray [place] 1 spray every day by intranasal route.    gabapentin (NEURONTIN) 300 MG capsule Take 2 capsules twice a day by oral route for 90 days. (Patient taking differently: Take 600 mg by mouth 2 (two) times daily.)    gabapentin (NEURONTIN) 300 MG capsule Take 2 capsules twice a day by oral route for 90 days.    hydrALAZINE (APRESOLINE) 100 MG tablet Take 1 tablet (100 mg total) by mouth 2 (two) times daily.    hydrALAZINE (APRESOLINE) 100 MG tablet Take 1 tablet twice a day by oral route for 90 days.    insulin (LANTUS SOLOSTAR U-100 INSULIN) glargine 100 units/mL SubQ pen Inject 22 units every day by subcutaneous route in the evening for 30 days, for diabetes.    insulin aspart U-100 (NOVOLOG FLEXPEN U-100 INSULIN) 100 unit/mL (3 mL) InPn pen Inject 8 units 3 times a day by subcutaneous route before meals (Patient taking differently: Inject 8 Units into the skin 3 (three) times daily before meals.)    insulin aspart U-100 (NOVOLOG FLEXPEN U-100 INSULIN) 100 unit/mL (3 mL) InPn pen Inject 10 units 3 times a day by subcutaneous route before meals for 90 days.    insulin detemir U-100, Levemir, (LEVEMIR FLEXTOUCH U100 INSULIN) 100 unit/mL (3 mL) InPn pen Inject 21 units every day by subcutaneous route in the evening for 90 days. (Patient taking differently: Inject  "21 Units into the skin every evening.)    isosorbide mononitrate (IMDUR) 30 MG 24 hr tablet Take 1 tablet (30 mg total) by mouth once daily.    isosorbide mononitrate (IMDUR) 30 MG 24 hr tablet Take 1 tablet every day by oral route for 90 days.    ketorolac 0.5% (ACULAR) 0.5 % Drop Instill 1 drop into the left eye 4 times a day for 30 days (Patient taking differently: Place 1 drop into both eyes 4 (four) times daily.)    lancets (TRUEPLUS LANCETS) 33 gauge Misc Use 1 to check blood glucose three times daily    lancets 33 gauge Misc Use to test twice daily    levETIRAcetam (KEPPRA) 500 MG Tab Take 1 tablet twice a day by oral route for 30 days. (Patient taking differently: Take 500 mg by mouth 2 (two) times daily.)    levETIRAcetam (KEPPRA) 500 MG Tab Take 1 tablet twice a day by oral route for 30 days.    ondansetron (ZOFRAN-ODT) 4 MG TbDL Place 1 tablet (4 mg total) under the tongue every 8 (eight) hours.    ondansetron (ZOFRAN-ODT) 4 MG TbDL Place 1 tablet (4 mg) on or under the tongue every 8 hours for 10 days.    oxyCODONE-acetaminophen (PERCOCET) 5-325 mg per tablet Take 1 tablet by mouth every 6 (six) hours as needed for pain. (Patient taking differently: Take 1 tablet by mouth every 6 (six) hours as needed for Pain.)    pantoprazole (PROTONIX) 40 MG tablet Take 1 tablet every day by oral route for 90 days.    pen needle, diabetic 31 gauge x 3/16" Ndle Use as directed to inject insulin 5 times daily    prednisoLONE acetate (PRED FORTE) 1 % DrpS Instill 1 drop into the left eye 4 times a day for 30 days (Patient taking differently: Place 1 drop into both eyes 4 (four) times daily.)    promethazine (PHENERGAN) 25 MG tablet Take 1 tablet (25 mg total) by mouth every 6 (six) hours as needed for 5 days. (Patient taking differently: Take 25 mg by mouth every 6 (six) hours as needed for Nausea.)    sevelamer carbonate (RENVELA) 800 mg Tab Take 2 tablets with meals three times a day (Patient taking differently: Take " 1,600 mg by mouth 3 (three) times daily with meals.)    sucralfate (CARAFATE) 100 mg/mL suspension Take 10 mL 4 times a day by oral route before meals for 30 days. (Patient taking differently: Take 1 g by mouth 4 (four) times daily with meals and nightly.)    sucralfate (CARAFATE) 100 mg/mL suspension Take 10 mL 4 times a day by oral route before meals for 30 days.    sucroferric oxyhydroxide (VELPHORO) 500 mg Chew Take 1 tablet 3 times a day (Patient taking differently: Take 1 tablet by mouth 3 (three) times daily.)    timolol maleate 0.5% (TIMOPTIC) 0.5 % Drop Instill 1 drop into the left eye 2 times a day for 30 days (Patient taking differently: Place 1 drop into both eyes 2 (two) times daily.)    [DISCONTINUED] atenoloL (TENORMIN) 50 MG tablet Take 1 tablet (50 mg total) by mouth every other day.    [DISCONTINUED] omeprazole (PRILOSEC) 20 MG capsule Take 2 capsules (40 mg total) by mouth once daily. for 10 days     Family History       Problem Relation (Age of Onset)    Diabetes Mother, Father          Tobacco Use    Smoking status: Never    Smokeless tobacco: Never   Substance and Sexual Activity    Alcohol use: Not Currently    Drug use: No    Sexual activity: Not Currently     Partners: Male     Birth control/protection: I.U.D.     Review of Systems   Constitutional:  Negative for chills and fever.   HENT:          Severe L eye pain and swelling   Eyes:  Positive for pain and visual disturbance.   Respiratory:  Negative for cough and shortness of breath.    Cardiovascular:  Negative for chest pain and leg swelling.   Gastrointestinal:  Negative for abdominal distention and abdominal pain.   Endocrine: Negative for cold intolerance and heat intolerance.   Genitourinary:  Negative for difficulty urinating and dysuria.   Musculoskeletal:  Negative for arthralgias and joint swelling.   Skin:  Negative for color change and pallor.   Allergic/Immunologic: Negative for environmental allergies and food allergies.    Neurological:  Negative for dizziness, speech difficulty and light-headedness.   Hematological:  Negative for adenopathy. Does not bruise/bleed easily.   Psychiatric/Behavioral:  Negative for agitation, behavioral problems and confusion.      Objective:     Vital Signs (Most Recent):  Temp: 98.5 °F (36.9 °C) (02/25/24 0542)  Pulse: 78 (02/25/24 0605)  Resp: 18 (02/25/24 0638)  BP: 133/79 (02/25/24 0601)  SpO2: 100 % (02/25/24 0605) Vital Signs (24h Range):  Temp:  [98.5 °F (36.9 °C)-98.9 °F (37.2 °C)] 98.5 °F (36.9 °C)  Pulse:  [78-93] 78  Resp:  [13-21] 18  SpO2:  [96 %-100 %] 100 %  BP: (123-190)/() 133/79     Weight: 53.1 kg (117 lb)  Body mass index is 21.4 kg/m².     Physical Exam  Constitutional:       Appearance: She is not toxic-appearing.      Comments: Crying out in pain, however able to answer questions   HENT:      Head: Normocephalic and atraumatic.   Eyes:      Comments: L eye swollen, firm, minimally opens 2/2 pain. Cannot tolerate light  R eye also somewhat swollen, but able to open, some conjunctival injection    Cardiovascular:      Rate and Rhythm: Normal rate and regular rhythm.   Pulmonary:      Effort: Pulmonary effort is normal. No respiratory distress.      Breath sounds: Normal breath sounds.   Abdominal:      General: Abdomen is flat. There is no distension.      Palpations: Abdomen is soft.      Tenderness: There is no abdominal tenderness.   Musculoskeletal:         General: No swelling. Normal range of motion.      Cervical back: Normal range of motion and neck supple.   Skin:     General: Skin is warm and dry.   Neurological:      General: No focal deficit present.      Mental Status: She is alert and oriented to person, place, and time.   Psychiatric:         Mood and Affect: Mood normal.         Behavior: Behavior normal.      Comments: Not altered, linear thought process                Significant Labs: All pertinent labs within the past 24 hours have been reviewed.  CBC:    Recent Labs   Lab 02/24/24  1855 02/25/24  0247   WBC  --  4.58   HGB  --  10.7*   HCT 31* 31.5*   PLT  --  99*     CMP:   Recent Labs   Lab 02/25/24  0247      K 3.7   CL 98   CO2 28   *   BUN 13   CREATININE 4.8*   CALCIUM 9.1   PROT 6.7   ALBUMIN 3.4*   BILITOT 1.1*   ALKPHOS 110   AST 13   ALT <5*   ANIONGAP 12         Significant Imaging: I have reviewed all pertinent imaging results/findings within the past 24 hours.  Assessment/Plan:     * Acute glaucoma of left eye  Evaluated by ophthalmology who performed AC tap following failure of conservative medical management. Demonstrated IOP 80 with improvement to 13 following procedure and IV diamox as well as timolol.   Having severe pain requiring IV dilaudid for adequate control.     PLAN:  -- Per Ophtho: Start drops:               Timolol/Dorzolomide (Cosopt) BID OS               Brimonidine TID OS  - Wait 5-10 min in between drops.   - Start Erythromycin mansoor TID OS after drops for 2 days.   - pain control with Roxicodone and dilaudid, wean as able      Hypertension  Chronic, uncontrolled. Latest blood pressure and vitals reviewed-     Temp:  [98.5 °F (36.9 °C)-98.9 °F (37.2 °C)]   Pulse:  [78-93]   Resp:  [13-21]   BP: (123-190)/()   SpO2:  [96 %-100 %] .   Home meds for hypertension were reviewed and noted below.   Hypertension Medications               amLODIPine (NORVASC) 5 MG tablet Take 1 tablet (5 mg total) by mouth once daily.    amLODIPine (NORVASC) 5 MG tablet Take 1 tablet every day by oral route for 90 days.    carvediloL (COREG) 25 MG tablet Take 1 tablet (25 mg total) by mouth 2 (two) times daily.    carvediloL (COREG) 25 MG tablet Take 1 tablet twice a day by oral route for 30 days.    hydrALAZINE (APRESOLINE) 100 MG tablet Take 1 tablet (100 mg total) by mouth 2 (two) times daily.    hydrALAZINE (APRESOLINE) 100 MG tablet Take 1 tablet twice a day by oral route for 90 days.    isosorbide mononitrate (IMDUR) 30 MG 24 hr tablet  Take 1 tablet (30 mg total) by mouth once daily.    isosorbide mononitrate (IMDUR) 30 MG 24 hr tablet Take 1 tablet every day by oral route for 90 days.            While in the hospital, will manage blood pressure as follows; Continue home antihypertensive regimen    Will utilize p.r.n. blood pressure medication only if patient's blood pressure greater than 220/110 and she develops symptoms such as worsening chest pain or shortness of breath.    Anemia in ESRD (end-stage renal disease)  Patient's anemia is currently controlled. Has not received any PRBCs to date. Etiology likely d/t chronic disease due to Chronic Kidney Disease/ESRD  Current CBC reviewed-   Lab Results   Component Value Date    HGB 10.7 (L) 02/25/2024    HCT 31.5 (L) 02/25/2024     Monitor serial CBC and transfuse if patient becomes hemodynamically unstable, symptomatic or H/H drops below 7/21.    Type 2 diabetes mellitus with hyperglycemia, with long-term current use of insulin, previous HbA1c 5.8%  Aspart 5u TIDWM, Lantus 10u qhs, SSI  Gabapentin 600 bid for neuropathy       Adjustment disorder  Continue SSRI      Thrombocytopenia  Patient was found to have thrombocytopenia, the likely etiology is primary from bone marrow suppression 2/2 chronic illness (ESRD), will monitor the platelets Daily. Will transfuse if platelet count is <50k (if undergoing surgical procedure or have active bleeding). Hold DVT prophylaxis if platelets are <50k. The patient's platelet results have been reviewed and are listed below.  Recent Labs   Lab 02/25/24  0247   PLT 99*         Deep vein thrombosis (DVT) of non-extremity vein  Continue Eliquis 5 bid      ESRD (end stage renal disease)  Creatine stable for now. BMP reviewed- noted Estimated Creatinine Clearance: 12.9 mL/min (A) (based on SCr of 4.8 mg/dL (H)). according to latest data. Based on current GFR, CKD stage is end stage.  Monitor UOP and serial BMP and adjust therapy as needed. Renally dose meds. Avoid  nephrotoxic medications and procedures.    HD TTS, CTM CMP   Continue Sevelamer     Seizure disorder  Continue Keppra        VTE Risk Mitigation (From admission, onward)           Ordered     apixaban tablet 5 mg  2 times daily         02/25/24 0529     IP VTE HIGH RISK PATIENT  Once         02/25/24 0530     Place sequential compression device  Until discontinued         02/25/24 0530                           On 02/25/2024, patient should be placed in hospital observation services under my care in collaboration with Dr. Lassiter.         Danii Marcano MD  Department of Hospital Medicine  [facility] - Emergency Dept

## 2024-02-25 NOTE — ASSESSMENT & PLAN NOTE
Patient's anemia is currently controlled. Has not received any PRBCs to date. Etiology likely d/t chronic disease due to Chronic Kidney Disease/ESRD  Current CBC reviewed-   Lab Results   Component Value Date    HGB 10.7 (L) 02/25/2024    HCT 31.5 (L) 02/25/2024     Monitor serial CBC and transfuse if patient becomes hemodynamically unstable, symptomatic or H/H drops below 7/21.

## 2024-02-25 NOTE — PROVIDER PROGRESS NOTES - EMERGENCY DEPT.
Ophthalmology performed AC tap with improvement in pressure.  Patient was persistent 10/10 eye pain.  Plan to admit for pain control.

## 2024-02-25 NOTE — ED NOTES
I-STAT Chem-8+ Results:   Value Reference Range   Sodium 138 136-145 mmol/L   Potassium  3.7 3.5-5.1 mmol/L   Chloride 95  mmol/L   Ionized Calcium 0.99 1.06-1.42 mmol/L   CO2 (measured) 31 23-29 mmol/L   Glucose 186  mg/dL   BUN 8 6-30 mg/dL   Creatinine 4.2 0.5-1.4 mg/dL   Hematocrit 31 36-54%

## 2024-02-25 NOTE — ASSESSMENT & PLAN NOTE
Chronic, uncontrolled. Latest blood pressure and vitals reviewed-     Temp:  [98.5 °F (36.9 °C)-98.9 °F (37.2 °C)]   Pulse:  [78-93]   Resp:  [13-21]   BP: (123-190)/()   SpO2:  [96 %-100 %] .   Home meds for hypertension were reviewed and noted below.   Hypertension Medications               amLODIPine (NORVASC) 5 MG tablet Take 1 tablet (5 mg total) by mouth once daily.    amLODIPine (NORVASC) 5 MG tablet Take 1 tablet every day by oral route for 90 days.    carvediloL (COREG) 25 MG tablet Take 1 tablet (25 mg total) by mouth 2 (two) times daily.    carvediloL (COREG) 25 MG tablet Take 1 tablet twice a day by oral route for 30 days.    hydrALAZINE (APRESOLINE) 100 MG tablet Take 1 tablet (100 mg total) by mouth 2 (two) times daily.    hydrALAZINE (APRESOLINE) 100 MG tablet Take 1 tablet twice a day by oral route for 90 days.    isosorbide mononitrate (IMDUR) 30 MG 24 hr tablet Take 1 tablet (30 mg total) by mouth once daily.    isosorbide mononitrate (IMDUR) 30 MG 24 hr tablet Take 1 tablet every day by oral route for 90 days.            While in the hospital, will manage blood pressure as follows; Continue home antihypertensive regimen    Will utilize p.r.n. blood pressure medication only if patient's blood pressure greater than 220/110 and she develops symptoms such as worsening chest pain or shortness of breath.

## 2024-02-25 NOTE — ASSESSMENT & PLAN NOTE
Evaluated by ophthalmology who performed AC tap following failure of conservative medical management. Demonstrated IOP 80 with improvement to 13 following procedure and IV diamox as well as timolol.   Having severe pain requiring IV dilaudid for adequate control.     PLAN:  -- Per Ophtho: Start drops:               Timolol/Dorzolomide (Cosopt) BID OS               Brimonidine TID OS  - Wait 5-10 min in between drops.   - Start Erythromycin mansoor TID OS after drops for 2 days.   - pain control with Roxicodone and dilaudid, wean as able

## 2024-02-25 NOTE — ED NOTES
Pt sleeping - doesn't want to eat breakfast at this time.  outside of parameters - does not require coverage art this time.

## 2024-02-25 NOTE — CONSULTS
"Consultation Report  Ophthalmology Service    Date: 2024    Reason for Consult: "left eye pain"     History of Present Illness: Tabby Howard is a 34 y.o. female with history PDR/Vit heme who presented to [facility] for painful vision loss OS for 1 week. Seen by outside ophthalmologist who did retnia surgery back in January for her. This DrKristen Tried to get her scheduled for glaucoma surgery next week. PT reports her pain is unbearable. Ophthalmology is being consulted to evaluate .     Pt reports unbearable pain in her left eye. Reports everything looks black in her left eye.     POcularHx: Retinal surgeries of unknown kind OU.     Current eye gtts: 5 drops of unknown kind.     Family Hx: family history includes Diabetes in her father and mother.     PMHx:  has a past medical history of ESRD on hemodialysis (2022), Gastritis (2022), Gastroparesis (2022), Heart failure with preserved ejection fraction (2022), History of supraventricular tachycardia, Hyperkalemia (2022), Hypertensive emergency (2022), Sickle cell trait (2022), and Type 2 diabetes mellitus.     PSurgHx:  has a past surgical history that includes Esophagogastroduodenoscopy (N/A, 10/18/2019);  section; Placement of dual-lumen vascular catheter (Left, 2022); Placement of dual-lumen vascular catheter (Right, 2022); Laparoscopic cholecystectomy (N/A, 2022); Esophagogastroduodenoscopy (N/A, 2022); Colonoscopy; Colonoscopy (N/A, 2022); Esophagogastroduodenoscopy (N/A, 2022); insertion, catheter, tunneled (N/A, 2023); and Endoscopic ultrasound of upper gastrointestinal tract (N/A, 2023).     Home Medications:   Prior to Admission medications    Medication Sig Start Date End Date Taking? Authorizing Provider   acetaZOLAMIDE (DIAMOX) 250 MG tablet 1 tablet 3 times a day for 30 days 24      amLODIPine (NORVASC) 5 MG tablet Take 1 tablet (5 mg total) by mouth once " daily.  Patient taking differently: Take 5 mg by mouth once daily. 10/20/23      amLODIPine (NORVASC) 5 MG tablet Take 1 tablet every day by oral route for 90 days. 2/21/24      apixaban (ELIQUIS) 5 mg Tab Take 1 tablet (5 mg total) by mouth 2 (two) times daily. 11/20/23      apixaban (ELIQUIS) 5 mg Tab Take 1 tablet twice a day by oral route for 90 days. 2/21/24      blood sugar diagnostic (TRUE METRIX GLUCOSE TEST STRIP) Strp Use 1 strip to check blood glucose three times daily 10/5/23   Armani Gurrola MD   blood-glucose meter (TRUE METRIX GLUCOSE METER) Misc Use to check blood glucose 10/5/23   Armani Gurrola MD   brinzolamide (AZOPT) 1 % ophthalmic suspension 1 drop 3 times a day for 30 days Left eye 2/16/24      busPIRone (BUSPAR) 10 MG tablet Take 1 tablet by mouth 3 times a day by oral route as needed, for panic/anxiety. 2/21/24      carvediloL (COREG) 25 MG tablet Take 1 tablet (25 mg total) by mouth 2 (two) times daily. 5/17/23   Roby Neves MD   carvediloL (COREG) 25 MG tablet Take 1 tablet twice a day by oral route for 30 days. 2/21/24      cetirizine (ZYRTEC) 10 MG tablet Take 1 tablet (10 mg total) by mouth once daily. 11/20/23      cetirizine (ZYRTEC) 10 MG tablet Take 1 tablet every day by oral route. 2/21/24      ciprofloxacin HCl (CILOXAN) 0.3 % ophthalmic solution Instill 1 drop into the left eye 4 times a day for 30 days  Patient taking differently: Place 1 drop into both eyes 4 (four) times daily. 2/1/24      FLUoxetine 20 MG capsule Take 1 capsule every day by oral route for 90 days. 2/21/24      fluticasone propionate (FLONASE ALLERGY RELIEF) 50 mcg/actuation nasal spray Twain 1 spray every day by intranasal route.  Patient taking differently: 1 spray by Each Nostril route Daily. 12/11/23      fluticasone propionate (FLONASE) 50 mcg/actuation nasal spray Twain 1 spray every day by intranasal route. 2/21/24      gabapentin (NEURONTIN) 300 MG capsule Take 2 capsules twice a day by oral  route for 90 days.  Patient taking differently: Take 600 mg by mouth 2 (two) times daily. 7/10/23      gabapentin (NEURONTIN) 300 MG capsule Take 2 capsules twice a day by oral route for 90 days. 2/21/24      hydrALAZINE (APRESOLINE) 100 MG tablet Take 1 tablet (100 mg total) by mouth 2 (two) times daily. 11/20/23      hydrALAZINE (APRESOLINE) 100 MG tablet Take 1 tablet twice a day by oral route for 90 days. 2/21/24      insulin (LANTUS SOLOSTAR U-100 INSULIN) glargine 100 units/mL SubQ pen Inject 22 units every day by subcutaneous route in the evening for 30 days, for diabetes. 2/21/24      insulin aspart U-100 (NOVOLOG FLEXPEN U-100 INSULIN) 100 unit/mL (3 mL) InPn pen Inject 8 units 3 times a day by subcutaneous route before meals  Patient taking differently: Inject 8 Units into the skin 3 (three) times daily before meals. 8/7/23      insulin aspart U-100 (NOVOLOG FLEXPEN U-100 INSULIN) 100 unit/mL (3 mL) InPn pen Inject 10 units 3 times a day by subcutaneous route before meals for 90 days. 2/21/24      insulin detemir U-100, Levemir, (LEVEMIR FLEXTOUCH U100 INSULIN) 100 unit/mL (3 mL) InPn pen Inject 21 units every day by subcutaneous route in the evening for 90 days.  Patient taking differently: Inject 21 Units into the skin every evening. 8/25/23      isosorbide mononitrate (IMDUR) 30 MG 24 hr tablet Take 1 tablet (30 mg total) by mouth once daily. 11/20/23      isosorbide mononitrate (IMDUR) 30 MG 24 hr tablet Take 1 tablet every day by oral route for 90 days. 2/21/24      ketorolac 0.5% (ACULAR) 0.5 % Drop Instill 1 drop into the left eye 4 times a day for 30 days  Patient taking differently: Place 1 drop into both eyes 4 (four) times daily. 2/1/24      lancets (TRUEPLUS LANCETS) 33 gauge Misc Use 1 to check blood glucose three times daily 10/5/23   Armani Gurrola MD   lancets 33 gauge Misc Use to test twice daily 6/12/23      levETIRAcetam (KEPPRA) 500 MG Tab Take 1 tablet twice a day by oral route for 30  "days.  Patient taking differently: Take 500 mg by mouth 2 (two) times daily. 2/22/23      levETIRAcetam (KEPPRA) 500 MG Tab Take 1 tablet twice a day by oral route for 30 days. 2/21/24      ondansetron (ZOFRAN-ODT) 4 MG TbDL Place 1 tablet (4 mg total) under the tongue every 8 (eight) hours. 8/7/23      ondansetron (ZOFRAN-ODT) 4 MG TbDL Place 1 tablet (4 mg) on or under the tongue every 8 hours for 10 days. 2/21/24      oxyCODONE-acetaminophen (PERCOCET) 5-325 mg per tablet Take 1 tablet by mouth every 6 (six) hours as needed for pain.  Patient taking differently: Take 1 tablet by mouth every 6 (six) hours as needed for Pain. 1/3/24      pantoprazole (PROTONIX) 40 MG tablet Take 1 tablet every day by oral route for 90 days. 2/21/24      pen needle, diabetic 31 gauge x 3/16" Ndle Use as directed to inject insulin 5 times daily 4/14/23   Violette Winslow MD   prednisoLONE acetate (PRED FORTE) 1 % DrpS Instill 1 drop into the left eye 4 times a day for 30 days  Patient taking differently: Place 1 drop into both eyes 4 (four) times daily. 2/1/24      promethazine (PHENERGAN) 25 MG tablet Take 1 tablet (25 mg total) by mouth every 6 (six) hours as needed for 5 days.  Patient taking differently: Take 25 mg by mouth every 6 (six) hours as needed for Nausea. 10/20/23      sevelamer carbonate (RENVELA) 800 mg Tab Take 2 tablets with meals three times a day  Patient taking differently: Take 1,600 mg by mouth 3 (three) times daily with meals. 12/1/23      sucralfate (CARAFATE) 100 mg/mL suspension Take 10 mL 4 times a day by oral route before meals for 30 days.  Patient taking differently: Take 1 g by mouth 4 (four) times daily with meals and nightly. 11/20/23      sucralfate (CARAFATE) 100 mg/mL suspension Take 10 mL 4 times a day by oral route before meals for 30 days. 2/21/24      sucroferric oxyhydroxide (VELPHORO) 500 mg Chew Take 1 tablet 3 times a day  Patient taking differently: Take 1 tablet by mouth 3 (three) times " daily. 9/29/23      timolol maleate 0.5% (TIMOPTIC) 0.5 % Drop Instill 1 drop into the left eye 2 times a day for 30 days  Patient taking differently: Place 1 drop into both eyes 2 (two) times daily. 2/1/24      amLODIPine (NORVASC) 5 MG tablet Take 1 tablet every day by oral route for 90 days. 11/20/23 2/24/24     atenoloL (TENORMIN) 50 MG tablet Take 1 tablet (50 mg total) by mouth every other day. 7/17/22 7/27/22  Keegan Cody MD   omeprazole (PRILOSEC) 20 MG capsule Take 2 capsules (40 mg total) by mouth once daily. for 10 days 8/9/22 8/26/22  Kaushik Patton MD        Medications this encounter:           Allergies: is allergic to penicillins.     Social:  reports that she has never smoked. She has never used smokeless tobacco. She reports that she does not currently use alcohol. She reports that she does not use drugs.     ROS: As per HPI    Ocular examination/Dilated fundus examination:  Base Eye Exam       Visual Acuity (Snellen - Linear)         Right Left    Dist sc  LP vs nlp    Dist ph sc 20/70               Tonometry (Tonopen, 7:39 PM)         Right Left    Pressure 20 79              Tonometry #2 (Applanation, 10:36 PM)         Right Left    Pressure  74              Tonometry #3 (Applanation, 10:36 PM)         Right Left    Pressure  13              Tonometry Comments    IOP improved to 13 after AC tap.              Pupils         Dark Light Shape React    Right 4 4 Round Minimal    Left       No view OS. Unclear if pt is pharm dilated from recent retinal surgery.              Visual Fields         Right Left     Full     Restrictions  Total superior temporal, inferior temporal, superior nasal, inferior nasal deficiencies              Extraocular Movement    Exotropia. Limited by pain and effort              Neuro/Psych       Oriented x3: Yes    Mood/Affect: Normal              Dilation       Right eye: 1% Tropicamide, 2.5% Phenylephrine, 2% Cyclopentolate @ 7:39 PM               "    Slit Lamp and Fundus Exam       External Exam         Right Left    External Normal Normal              Slit Lamp Exam         Right Left    Lids/Lashes Normal Normal    Conjunctiva/Sclera White and quiet injection and chemosis    Cornea Clear MCE difusely    Anterior Chamber Deep and formed total hyphema    Iris round, minimally reactive no view    Lens 1+ Cortical cataract no view    Anterior Vitreous Normal no view              Fundus Exam         Right Left    Disc Normal unable to assess    C/D Ratio  unable to assess    Macula Normal unable to assess    Vessels Normal unable to assess    Periphery poor view 2/2 patient compliiance. Some laser scars inferiorly. unable to assess                      Assessment/Plan:     1. Hyphema OS with elevated IOP   - VA 20/70 // LP or worse.   - IOP on presentation 20//79 improved to 13 after AC tap  - unclear if pharm dilated, or poor reactivity of pupils OD // US ANTOINETTE  - PT seen by her usual eye doctor this week who may have "stuck a needle" in her eye. Scheduled to see a glaucoma specialist on Tuesday. Patient reports her eye doctor has her on 5 eye drops since she recently had retinal surgery in her Left eye in January and he gave her more drops when he saw her this week. PT unsure what drops are or what color they are.   - IV Diamox given for IOP, Dorzolamide, brimonidine, and timolol given for IOP q 10min.   - IOP failed to improve with medical management   - after discussion with staff, AC tap recommended. RBA discussed with patient and patient desired to go forward with procedure. Written consent obtained and uploaded to chart. See procedure note below.   - IOP improved to 13 after AC Tap     Recommendations:   - Start drops:    Timolol/Dorzolomide (Cosopt) BID OS   Brimonidine TID OS  - Wait 5-10 min in between drops.   - Start Erythromycin mansoor TID OS after drops for 2 days.   - Discussed the importance of using the drops with the patient.   - discussed with " patient the importance of following up with her regular ophthalmologist and the Glaucoma specialist on Tuesday  - Very possible that IOP will increase as aqueous re-accumulates    - RD/return precautions discussed   - PT knows she can return   - Pain control per ED, should improve with improved IOP   - Follow up with her regular ophthalmologist and glaucoma specialist next week.     Patient's Best Contact Number: [phone]     Patient discussed with Dr. Pierre and Dr. Jarrod Leach MD   \[facility]\"" Ophthalmology PGY2  02/24/2024  7:47 PM      Procedure Note:   Procedure: Anterior chamber tap, Left eye  Indication: Elevated intraocular pressure, hyphema    Risks, benefits, and alternatives were explained.  Patient wished to proceed.  Informed consent obtained. Topical proparacaine instilled for anesthesia. Topical betadine instilled for antisepsis.  Anterior Chamber tap performed with a 30 gauge needle on a 1cc syringe.  The eye was irrigated with BSS.  Patient tolerated well with no immediate complications. Moxifloxacin was given for infection prophylaxis.     Common Ophthalmologic Abbreviations  OD: right eye  OS: left eye  OU: both eyes  IOP: intraocular pressure  VA: visual acuity  PH: pinhole  HM: hand motion  LP: light perception  NLP: no light perception  DFE: dilated fundus examination  SLE: slit lamp examination  RD: retinal detachment   AT: artificial tears  PFAT: preservative free artificial tears

## 2024-02-25 NOTE — SUBJECTIVE & OBJECTIVE
Past Medical History:   Diagnosis Date    ESRD on hemodialysis 2022    Gastritis 2022    EGD was 22    Gastroparesis 2022    has not had Emptying study    Heart failure with preserved ejection fraction 2022    EF 55% on 3/22    History of supraventricular tachycardia     Hyperkalemia 2022    Hypertensive emergency 2022    Sickle cell trait 2022    Type 2 diabetes mellitus        Past Surgical History:   Procedure Laterality Date     SECTION      x 3    COLONOSCOPY      COLONOSCOPY N/A 2022    Procedure: COLONOSCOPY;  Surgeon: Jagdeep Cedeno MD;  Location: [facility];  Service: Endoscopy;  Laterality: N/A;    ENDOSCOPIC ULTRASOUND OF UPPER GASTROINTESTINAL TRACT N/A 2023    Procedure: ULTRASOUND, UPPER GI TRACT, ENDOSCOPIC;  Surgeon: Micky Paredes III, MD;  Location: [facility];  Service: Endoscopy;  Laterality: N/A;    ESOPHAGOGASTRODUODENOSCOPY N/A 10/18/2019    Procedure: ESOPHAGOGASTRODUODENOSCOPY (EGD);  Surgeon: Gianluca Mendez MD;  Location: [facility];  Service: Endoscopy;  Laterality: N/A;    ESOPHAGOGASTRODUODENOSCOPY N/A 2022    Procedure: EGD (ESOPHAGOGASTRODUODENOSCOPY);  Surgeon: Micky Paredes III, MD;  Location: [facility];  Service: Endoscopy;  Laterality: N/A;    ESOPHAGOGASTRODUODENOSCOPY N/A 2022    Procedure: EGD (ESOPHAGOGASTRODUODENOSCOPY);  Surgeon: Marcelo Zhong MD;  Location: [facility] ENDO;  Service: Endoscopy;  Laterality: N/A;    INSERTION, CATHETER, TUNNELED N/A 2023    Procedure: Insertion,catheter,tunneled;  Surgeon: Carlos Thurman Jr., MD;  Location: [facility] OR;  Service: General;  Laterality: N/A;    LAPAROSCOPIC CHOLECYSTECTOMY N/A 2022    Procedure: CHOLECYSTECTOMY, LAPAROSCOPIC;  Surgeon: Grey Perez MD;  Location: [facility] OR;  Service: General;  Laterality: N/A;    PLACEMENT OF DUAL-LUMEN VASCULAR CATHETER Left 2022    Procedure: INSERTION-CATHETER-JOSEPH;  Surgeon: Dionte KATZ  MD Elvia;  Location: [facility] OR;  Service: General;  Laterality: Left;    PLACEMENT OF DUAL-LUMEN VASCULAR CATHETER Right 07/26/2022    Procedure: INSERTION-CATHETER-Hemosplit;  Surgeon: Dionte Gan MD;  Location: [facility] OR;  Service: General;  Laterality: Right;       Review of patient's allergies indicates:   Allergen Reactions    Penicillins Hives       Current Facility-Administered Medications on File Prior to Encounter   Medication    [COMPLETED] acetaZOLAMIDE tablet 500 mg    [COMPLETED] HYDROcodone-acetaminophen  mg per tablet 1 tablet    [COMPLETED] ondansetron disintegrating tablet 4 mg    [COMPLETED] TETRAcaine HCl (PF) 0.5 % Drop 2 drop    [DISCONTINUED] timolol maleate 0.5% ophthalmic solution 1 drop     Current Outpatient Medications on File Prior to Encounter   Medication Sig    acetaZOLAMIDE (DIAMOX) 250 MG tablet 1 tablet 3 times a day for 30 days    amLODIPine (NORVASC) 5 MG tablet Take 1 tablet (5 mg total) by mouth once daily. (Patient taking differently: Take 5 mg by mouth once daily.)    amLODIPine (NORVASC) 5 MG tablet Take 1 tablet every day by oral route for 90 days.    apixaban (ELIQUIS) 5 mg Tab Take 1 tablet (5 mg total) by mouth 2 (two) times daily.    apixaban (ELIQUIS) 5 mg Tab Take 1 tablet twice a day by oral route for 90 days.    blood sugar diagnostic (TRUE METRIX GLUCOSE TEST STRIP) Strp Use 1 strip to check blood glucose three times daily    blood-glucose meter (TRUE METRIX GLUCOSE METER) Misc Use to check blood glucose    brinzolamide (AZOPT) 1 % ophthalmic suspension 1 drop 3 times a day for 30 days Left eye    busPIRone (BUSPAR) 10 MG tablet Take 1 tablet by mouth 3 times a day by oral route as needed, for panic/anxiety.    carvediloL (COREG) 25 MG tablet Take 1 tablet (25 mg total) by mouth 2 (two) times daily.    carvediloL (COREG) 25 MG tablet Take 1 tablet twice a day by oral route for 30 days.    cetirizine (ZYRTEC) 10 MG tablet Take 1 tablet (10 mg total) by mouth  once daily.    cetirizine (ZYRTEC) 10 MG tablet Take 1 tablet every day by oral route.    ciprofloxacin HCl (CILOXAN) 0.3 % ophthalmic solution Instill 1 drop into the left eye 4 times a day for 30 days (Patient taking differently: Place 1 drop into both eyes 4 (four) times daily.)    FLUoxetine 20 MG capsule Take 1 capsule every day by oral route for 90 days.    fluticasone propionate (FLONASE ALLERGY RELIEF) 50 mcg/actuation nasal spray [place] 1 spray every day by intranasal route. (Patient taking differently: 1 spray by Each Nostril route Daily.)    fluticasone propionate (FLONASE) 50 mcg/actuation nasal spray [place] 1 spray every day by intranasal route.    gabapentin (NEURONTIN) 300 MG capsule Take 2 capsules twice a day by oral route for 90 days. (Patient taking differently: Take 600 mg by mouth 2 (two) times daily.)    gabapentin (NEURONTIN) 300 MG capsule Take 2 capsules twice a day by oral route for 90 days.    hydrALAZINE (APRESOLINE) 100 MG tablet Take 1 tablet (100 mg total) by mouth 2 (two) times daily.    hydrALAZINE (APRESOLINE) 100 MG tablet Take 1 tablet twice a day by oral route for 90 days.    insulin (LANTUS SOLOSTAR U-100 INSULIN) glargine 100 units/mL SubQ pen Inject 22 units every day by subcutaneous route in the evening for 30 days, for diabetes.    insulin aspart U-100 (NOVOLOG FLEXPEN U-100 INSULIN) 100 unit/mL (3 mL) InPn pen Inject 8 units 3 times a day by subcutaneous route before meals (Patient taking differently: Inject 8 Units into the skin 3 (three) times daily before meals.)    insulin aspart U-100 (NOVOLOG FLEXPEN U-100 INSULIN) 100 unit/mL (3 mL) InPn pen Inject 10 units 3 times a day by subcutaneous route before meals for 90 days.    insulin detemir U-100, Levemir, (LEVEMIR FLEXTOUCH U100 INSULIN) 100 unit/mL (3 mL) InPn pen Inject 21 units every day by subcutaneous route in the evening for 90 days. (Patient taking differently: Inject 21 Units into the skin every evening.)     "isosorbide mononitrate (IMDUR) 30 MG 24 hr tablet Take 1 tablet (30 mg total) by mouth once daily.    isosorbide mononitrate (IMDUR) 30 MG 24 hr tablet Take 1 tablet every day by oral route for 90 days.    ketorolac 0.5% (ACULAR) 0.5 % Drop Instill 1 drop into the left eye 4 times a day for 30 days (Patient taking differently: Place 1 drop into both eyes 4 (four) times daily.)    lancets (TRUEPLUS LANCETS) 33 gauge Misc Use 1 to check blood glucose three times daily    lancets 33 gauge Misc Use to test twice daily    levETIRAcetam (KEPPRA) 500 MG Tab Take 1 tablet twice a day by oral route for 30 days. (Patient taking differently: Take 500 mg by mouth 2 (two) times daily.)    levETIRAcetam (KEPPRA) 500 MG Tab Take 1 tablet twice a day by oral route for 30 days.    ondansetron (ZOFRAN-ODT) 4 MG TbDL Place 1 tablet (4 mg total) under the tongue every 8 (eight) hours.    ondansetron (ZOFRAN-ODT) 4 MG TbDL Place 1 tablet (4 mg) on or under the tongue every 8 hours for 10 days.    oxyCODONE-acetaminophen (PERCOCET) 5-325 mg per tablet Take 1 tablet by mouth every 6 (six) hours as needed for pain. (Patient taking differently: Take 1 tablet by mouth every 6 (six) hours as needed for Pain.)    pantoprazole (PROTONIX) 40 MG tablet Take 1 tablet every day by oral route for 90 days.    pen needle, diabetic 31 gauge x 3/16" Ndle Use as directed to inject insulin 5 times daily    prednisoLONE acetate (PRED FORTE) 1 % DrpS Instill 1 drop into the left eye 4 times a day for 30 days (Patient taking differently: Place 1 drop into both eyes 4 (four) times daily.)    promethazine (PHENERGAN) 25 MG tablet Take 1 tablet (25 mg total) by mouth every 6 (six) hours as needed for 5 days. (Patient taking differently: Take 25 mg by mouth every 6 (six) hours as needed for Nausea.)    sevelamer carbonate (RENVELA) 800 mg Tab Take 2 tablets with meals three times a day (Patient taking differently: Take 1,600 mg by mouth 3 (three) times daily " with meals.)    sucralfate (CARAFATE) 100 mg/mL suspension Take 10 mL 4 times a day by oral route before meals for 30 days. (Patient taking differently: Take 1 g by mouth 4 (four) times daily with meals and nightly.)    sucralfate (CARAFATE) 100 mg/mL suspension Take 10 mL 4 times a day by oral route before meals for 30 days.    sucroferric oxyhydroxide (VELPHORO) 500 mg Chew Take 1 tablet 3 times a day (Patient taking differently: Take 1 tablet by mouth 3 (three) times daily.)    timolol maleate 0.5% (TIMOPTIC) 0.5 % Drop Instill 1 drop into the left eye 2 times a day for 30 days (Patient taking differently: Place 1 drop into both eyes 2 (two) times daily.)    [DISCONTINUED] atenoloL (TENORMIN) 50 MG tablet Take 1 tablet (50 mg total) by mouth every other day.    [DISCONTINUED] omeprazole (PRILOSEC) 20 MG capsule Take 2 capsules (40 mg total) by mouth once daily. for 10 days     Family History       Problem Relation (Age of Onset)    Diabetes Mother, Father          Tobacco Use    Smoking status: Never    Smokeless tobacco: Never   Substance and Sexual Activity    Alcohol use: Not Currently    Drug use: No    Sexual activity: Not Currently     Partners: Male     Birth control/protection: I.U.D.     Review of Systems   Constitutional:  Negative for chills and fever.   HENT:          Severe L eye pain and swelling   Eyes:  Positive for pain and visual disturbance.   Respiratory:  Negative for cough and shortness of breath.    Cardiovascular:  Negative for chest pain and leg swelling.   Gastrointestinal:  Negative for abdominal distention and abdominal pain.   Endocrine: Negative for cold intolerance and heat intolerance.   Genitourinary:  Negative for difficulty urinating and dysuria.   Musculoskeletal:  Negative for arthralgias and joint swelling.   Skin:  Negative for color change and pallor.   Allergic/Immunologic: Negative for environmental allergies and food allergies.   Neurological:  Negative for dizziness,  speech difficulty and light-headedness.   Hematological:  Negative for adenopathy. Does not bruise/bleed easily.   Psychiatric/Behavioral:  Negative for agitation, behavioral problems and confusion.      Objective:     Vital Signs (Most Recent):  Temp: 98.5 °F (36.9 °C) (02/25/24 0542)  Pulse: 78 (02/25/24 0605)  Resp: 18 (02/25/24 0638)  BP: 133/79 (02/25/24 0601)  SpO2: 100 % (02/25/24 0605) Vital Signs (24h Range):  Temp:  [98.5 °F (36.9 °C)-98.9 °F (37.2 °C)] 98.5 °F (36.9 °C)  Pulse:  [78-93] 78  Resp:  [13-21] 18  SpO2:  [96 %-100 %] 100 %  BP: (123-190)/() 133/79     Weight: 53.1 kg (117 lb)  Body mass index is 21.4 kg/m².     Physical Exam  Constitutional:       Appearance: She is not toxic-appearing.      Comments: Crying out in pain, however able to answer questions   HENT:      Head: Normocephalic and atraumatic.   Eyes:      Comments: L eye swollen, firm, minimally opens 2/2 pain. Cannot tolerate light  R eye also somewhat swollen, but able to open, some conjunctival injection    Cardiovascular:      Rate and Rhythm: Normal rate and regular rhythm.   Pulmonary:      Effort: Pulmonary effort is normal. No respiratory distress.      Breath sounds: Normal breath sounds.   Abdominal:      General: Abdomen is flat. There is no distension.      Palpations: Abdomen is soft.      Tenderness: There is no abdominal tenderness.   Musculoskeletal:         General: No swelling. Normal range of motion.      Cervical back: Normal range of motion and neck supple.   Skin:     General: Skin is warm and dry.   Neurological:      General: No focal deficit present.      Mental Status: She is alert and oriented to person, place, and time.   Psychiatric:         Mood and Affect: Mood normal.         Behavior: Behavior normal.      Comments: Not altered, linear thought process                Significant Labs: All pertinent labs within the past 24 hours have been reviewed.  CBC:   Recent Labs   Lab 02/24/24  5153  02/25/24  0247   WBC  --  4.58   HGB  --  10.7*   HCT 31* 31.5*   PLT  --  99*     CMP:   Recent Labs   Lab 02/25/24  0247      K 3.7   CL 98   CO2 28   *   BUN 13   CREATININE 4.8*   CALCIUM 9.1   PROT 6.7   ALBUMIN 3.4*   BILITOT 1.1*   ALKPHOS 110   AST 13   ALT <5*   ANIONGAP 12         Significant Imaging: I have reviewed all pertinent imaging results/findings within the past 24 hours.

## 2024-02-25 NOTE — ED NOTES
LOC: The patient is awake and alert; oriented x 3 and speaking appropriately.  APPEARANCE: Patient resting comfortably, patient is clean and well groomed  SKIN: warm and dry, normal skin turgor & moist mucus membranes, skin intact, no breakdown noted.Left eyelids swollen  MUSCULOSKELETAL: Patient moving all extremities well, no obvious swelling or deformities noted  RESPIRATORY: Airway is open and patent, respirations are spontaneous, normal effort and rate  CARDIAC: Patient has a normal rate, no peripheral edema noted, capillary refill < 3 seconds; No complaints of chest pain , dialysis cath in rt upper chest w/ d/I dressing  ABDOMEN: Soft and non tender to palpation, no distention noted.     Pt remains on cardiac monitor, continuous pulse ox, cycling blood pressures. Side rails up x2, call bell in reach, bed in low position with brake engaged.  Skin w/d, resp wnl,  on O2 2l/nc, resting at this time. VS's stable

## 2024-02-25 NOTE — ED TRIAGE NOTES
Tabby Howard, a 34 y.o. female presents to the ED w/ complaint of left eye pain x 7 days. Pt states that she had retinal detachment surgery in Jan. Pt states that pain has gotten progressively worse over the last few days.     Triage note:  Chief Complaint   Patient presents with    retinal detachment     Retinal detachment surgery 2 weeks reports pain 10/10. Redness  and swelling to the left eye. Transferred from [facility] for opthalmology consult. Hx diabetes      Review of patient's allergies indicates:   Allergen Reactions    Penicillins Hives     Past Medical History:   Diagnosis Date    ESRD on hemodialysis 04/12/2022    Gastritis 12/2022    EGD was 12/5/22    Gastroparesis 04/12/2022    has not had Emptying study    Heart failure with preserved ejection fraction 04/12/2022    EF 55% on 3/22    History of supraventricular tachycardia     Hyperkalemia 07/07/2022    Hypertensive emergency 07/08/2022    Sickle cell trait 04/12/2022    Type 2 diabetes mellitus

## 2024-02-25 NOTE — ASSESSMENT & PLAN NOTE
Patient was found to have thrombocytopenia, the likely etiology is primary from bone marrow suppression 2/2 chronic illness (ESRD), will monitor the platelets Daily. Will transfuse if platelet count is <50k (if undergoing surgical procedure or have active bleeding). Hold DVT prophylaxis if platelets are <50k. The patient's platelet results have been reviewed and are listed below.  Recent Labs   Lab 02/25/24  0247   PLT 99*

## 2024-02-25 NOTE — ED NOTES
Assumed care of patient. . Patient responsive to aggressive stimulation. Patient given prn narcan. Ophthalmology now at bedside for exam

## 2024-02-25 NOTE — ED NOTES
Patient calling for nurse. Nurse arrives to room. Patient wanted food heated up and extra blanket. Food re-warmed and extra blanket provided

## 2024-02-26 LAB
ALBUMIN SERPL BCP-MCNC: 3.1 G/DL (ref 3.5–5.2)
ALP SERPL-CCNC: 93 U/L (ref 55–135)
ALT SERPL W/O P-5'-P-CCNC: <5 U/L (ref 10–44)
ANION GAP SERPL CALC-SCNC: 13 MMOL/L (ref 8–16)
AST SERPL-CCNC: 16 U/L (ref 10–40)
BASOPHILS # BLD AUTO: 0.02 K/UL (ref 0–0.2)
BASOPHILS # BLD AUTO: 0.03 K/UL (ref 0–0.2)
BASOPHILS NFR BLD: 0.6 % (ref 0–1.9)
BASOPHILS NFR BLD: 0.7 % (ref 0–1.9)
BILIRUB SERPL-MCNC: 0.9 MG/DL (ref 0.1–1)
BUN SERPL-MCNC: 24 MG/DL (ref 6–20)
CALCIUM SERPL-MCNC: 8.7 MG/DL (ref 8.7–10.5)
CHLORIDE SERPL-SCNC: 100 MMOL/L (ref 95–110)
CO2 SERPL-SCNC: 27 MMOL/L (ref 23–29)
CREAT SERPL-MCNC: 6.6 MG/DL (ref 0.5–1.4)
DIFFERENTIAL METHOD BLD: ABNORMAL
DIFFERENTIAL METHOD BLD: ABNORMAL
EOSINOPHIL # BLD AUTO: 0.2 K/UL (ref 0–0.5)
EOSINOPHIL # BLD AUTO: 0.2 K/UL (ref 0–0.5)
EOSINOPHIL NFR BLD: 4 % (ref 0–8)
EOSINOPHIL NFR BLD: 4.3 % (ref 0–8)
ERYTHROCYTE [DISTWIDTH] IN BLOOD BY AUTOMATED COUNT: 16.6 % (ref 11.5–14.5)
ERYTHROCYTE [DISTWIDTH] IN BLOOD BY AUTOMATED COUNT: 16.6 % (ref 11.5–14.5)
EST. GFR  (NO RACE VARIABLE): 7.8 ML/MIN/1.73 M^2
GLUCOSE SERPL-MCNC: 43 MG/DL (ref 70–110)
HCT VFR BLD AUTO: 25.2 % (ref 37–48.5)
HCT VFR BLD AUTO: 25.7 % (ref 37–48.5)
HGB BLD-MCNC: 8.4 G/DL (ref 12–16)
HGB BLD-MCNC: 8.7 G/DL (ref 12–16)
IMM GRANULOCYTES # BLD AUTO: 0.01 K/UL (ref 0–0.04)
IMM GRANULOCYTES # BLD AUTO: 0.02 K/UL (ref 0–0.04)
IMM GRANULOCYTES NFR BLD AUTO: 0.3 % (ref 0–0.5)
IMM GRANULOCYTES NFR BLD AUTO: 0.5 % (ref 0–0.5)
LYMPHOCYTES # BLD AUTO: 1 K/UL (ref 1–4.8)
LYMPHOCYTES # BLD AUTO: 1.2 K/UL (ref 1–4.8)
LYMPHOCYTES NFR BLD: 27.3 % (ref 18–48)
LYMPHOCYTES NFR BLD: 29 % (ref 18–48)
MAGNESIUM SERPL-MCNC: 2 MG/DL (ref 1.6–2.6)
MCH RBC QN AUTO: 30.2 PG (ref 27–31)
MCH RBC QN AUTO: 30.6 PG (ref 27–31)
MCHC RBC AUTO-ENTMCNC: 33.3 G/DL (ref 32–36)
MCHC RBC AUTO-ENTMCNC: 33.9 G/DL (ref 32–36)
MCV RBC AUTO: 91 FL (ref 82–98)
MCV RBC AUTO: 91 FL (ref 82–98)
MONOCYTES # BLD AUTO: 0.3 K/UL (ref 0.3–1)
MONOCYTES # BLD AUTO: 0.4 K/UL (ref 0.3–1)
MONOCYTES NFR BLD: 11.3 % (ref 4–15)
MONOCYTES NFR BLD: 8.1 % (ref 4–15)
NEUTROPHILS # BLD AUTO: 1.9 K/UL (ref 1.8–7.7)
NEUTROPHILS # BLD AUTO: 2.5 K/UL (ref 1.8–7.7)
NEUTROPHILS NFR BLD: 54.5 % (ref 38–73)
NEUTROPHILS NFR BLD: 59.4 % (ref 38–73)
NRBC BLD-RTO: 0 /100 WBC
NRBC BLD-RTO: 0 /100 WBC
PHOSPHATE SERPL-MCNC: 4.9 MG/DL (ref 2.7–4.5)
PLATELET # BLD AUTO: 49 K/UL (ref 150–450)
PLATELET # BLD AUTO: 49 K/UL (ref 150–450)
PMV BLD AUTO: 10.3 FL (ref 9.2–12.9)
PMV BLD AUTO: 9.1 FL (ref 9.2–12.9)
POCT GLUCOSE: 110 MG/DL (ref 70–110)
POCT GLUCOSE: 119 MG/DL (ref 70–110)
POCT GLUCOSE: 127 MG/DL (ref 70–110)
POCT GLUCOSE: 147 MG/DL (ref 70–110)
POCT GLUCOSE: 73 MG/DL (ref 70–110)
POTASSIUM SERPL-SCNC: 4.3 MMOL/L (ref 3.5–5.1)
PROT SERPL-MCNC: 6.1 G/DL (ref 6–8.4)
RBC # BLD AUTO: 2.78 M/UL (ref 4–5.4)
RBC # BLD AUTO: 2.84 M/UL (ref 4–5.4)
SODIUM SERPL-SCNC: 140 MMOL/L (ref 136–145)
WBC # BLD AUTO: 3.45 K/UL (ref 3.9–12.7)
WBC # BLD AUTO: 4.22 K/UL (ref 3.9–12.7)

## 2024-02-26 PROCEDURE — 99222 1ST HOSP IP/OBS MODERATE 55: CPT | Mod: ,,, | Performed by: NURSE PRACTITIONER

## 2024-02-26 PROCEDURE — 11000001 HC ACUTE MED/SURG PRIVATE ROOM

## 2024-02-26 PROCEDURE — 25000003 PHARM REV CODE 250

## 2024-02-26 PROCEDURE — 63600175 PHARM REV CODE 636 W HCPCS

## 2024-02-26 PROCEDURE — 85025 COMPLETE CBC W/AUTO DIFF WBC: CPT | Mod: 91

## 2024-02-26 PROCEDURE — 36415 COLL VENOUS BLD VENIPUNCTURE: CPT

## 2024-02-26 PROCEDURE — 84100 ASSAY OF PHOSPHORUS: CPT

## 2024-02-26 PROCEDURE — 80053 COMPREHEN METABOLIC PANEL: CPT

## 2024-02-26 PROCEDURE — 85025 COMPLETE CBC W/AUTO DIFF WBC: CPT

## 2024-02-26 PROCEDURE — 83735 ASSAY OF MAGNESIUM: CPT

## 2024-02-26 PROCEDURE — 36415 COLL VENOUS BLD VENIPUNCTURE: CPT | Mod: XB

## 2024-02-26 RX ORDER — OXYCODONE HYDROCHLORIDE 5 MG/1
5 TABLET ORAL EVERY 4 HOURS PRN
Status: DISCONTINUED | OUTPATIENT
Start: 2024-02-26 | End: 2024-02-28 | Stop reason: HOSPADM

## 2024-02-26 RX ORDER — ATROPINE SULFATE 10 MG/ML
1 SOLUTION/ DROPS OPHTHALMIC 2 TIMES DAILY
Status: DISCONTINUED | OUTPATIENT
Start: 2024-02-26 | End: 2024-02-28 | Stop reason: HOSPADM

## 2024-02-26 RX ORDER — PREDNISOLONE ACETATE 10 MG/ML
1 SUSPENSION/ DROPS OPHTHALMIC 4 TIMES DAILY
Status: DISCONTINUED | OUTPATIENT
Start: 2024-02-26 | End: 2024-02-28 | Stop reason: HOSPADM

## 2024-02-26 RX ORDER — ACETAMINOPHEN 500 MG
1000 TABLET ORAL EVERY 8 HOURS PRN
Status: DISCONTINUED | OUTPATIENT
Start: 2024-02-26 | End: 2024-02-28 | Stop reason: HOSPADM

## 2024-02-26 RX ADMIN — OXYCODONE 5 MG: 5 TABLET ORAL at 09:02

## 2024-02-26 RX ADMIN — BRIMONIDINE TARTRATE 1 DROP: 1.5 SOLUTION/ DROPS OPHTHALMIC at 09:02

## 2024-02-26 RX ADMIN — AMLODIPINE BESYLATE 5 MG: 5 TABLET ORAL at 10:02

## 2024-02-26 RX ADMIN — PREDNISOLONE ACETATE 1 DROP: 10 SUSPENSION/ DROPS OPHTHALMIC at 09:02

## 2024-02-26 RX ADMIN — CETIRIZINE HYDROCHLORIDE 10 MG: 10 TABLET, FILM COATED ORAL at 10:02

## 2024-02-26 RX ADMIN — METHAZOLAMIDE 100 MG: 50 TABLET ORAL at 12:02

## 2024-02-26 RX ADMIN — ATROPINE SULFATE 1 DROP: 10 SOLUTION/ DROPS OPHTHALMIC at 12:02

## 2024-02-26 RX ADMIN — HYDRALAZINE HYDROCHLORIDE 100 MG: 50 TABLET ORAL at 10:02

## 2024-02-26 RX ADMIN — LEVETIRACETAM 500 MG: 500 TABLET, FILM COATED ORAL at 09:02

## 2024-02-26 RX ADMIN — DORZOLAMIDE HCL 1 DROP: 20 SOLUTION/ DROPS OPHTHALMIC at 10:02

## 2024-02-26 RX ADMIN — DEXTROSE MONOHYDRATE 250 ML: 100 INJECTION, SOLUTION INTRAVENOUS at 05:02

## 2024-02-26 RX ADMIN — CARVEDILOL 25 MG: 12.5 TABLET, FILM COATED ORAL at 09:02

## 2024-02-26 RX ADMIN — ATROPINE SULFATE 1 DROP: 10 SOLUTION/ DROPS OPHTHALMIC at 09:02

## 2024-02-26 RX ADMIN — KETOROLAC TROMETHAMINE 1 DROP: 5 SOLUTION/ DROPS OPHTHALMIC at 12:02

## 2024-02-26 RX ADMIN — ERYTHROMYCIN: 5 OINTMENT OPHTHALMIC at 05:02

## 2024-02-26 RX ADMIN — METHAZOLAMIDE 100 MG: 50 TABLET ORAL at 09:02

## 2024-02-26 RX ADMIN — PREDNISOLONE ACETATE 1 DROP: 10 SUSPENSION/ DROPS OPHTHALMIC at 04:02

## 2024-02-26 RX ADMIN — HYDROMORPHONE HYDROCHLORIDE 1 MG: 1 INJECTION, SOLUTION INTRAMUSCULAR; INTRAVENOUS; SUBCUTANEOUS at 05:02

## 2024-02-26 RX ADMIN — SEVELAMER CARBONATE 800 MG: 800 TABLET, FILM COATED ORAL at 04:02

## 2024-02-26 RX ADMIN — HYDRALAZINE HYDROCHLORIDE 100 MG: 50 TABLET ORAL at 09:02

## 2024-02-26 RX ADMIN — METHAZOLAMIDE 100 MG: 50 TABLET ORAL at 04:02

## 2024-02-26 RX ADMIN — SUCRALFATE 1 G: 1 SUSPENSION ORAL at 04:02

## 2024-02-26 RX ADMIN — BRIMONIDINE TARTRATE 1 DROP: 1.5 SOLUTION/ DROPS OPHTHALMIC at 04:02

## 2024-02-26 RX ADMIN — SUCRALFATE 1 G: 1 SUSPENSION ORAL at 09:02

## 2024-02-26 RX ADMIN — HYDROMORPHONE HYDROCHLORIDE 1 MG: 1 INJECTION, SOLUTION INTRAMUSCULAR; INTRAVENOUS; SUBCUTANEOUS at 10:02

## 2024-02-26 RX ADMIN — DORZOLAMIDE HCL 1 DROP: 20 SOLUTION/ DROPS OPHTHALMIC at 09:02

## 2024-02-26 RX ADMIN — TIMOLOL MALEATE 1 DROP: 5 SOLUTION OPHTHALMIC at 09:02

## 2024-02-26 RX ADMIN — GABAPENTIN 600 MG: 300 CAPSULE ORAL at 09:02

## 2024-02-26 RX ADMIN — OXYCODONE 5 MG: 5 TABLET ORAL at 04:02

## 2024-02-26 RX ADMIN — ERYTHROMYCIN: 5 OINTMENT OPHTHALMIC at 01:02

## 2024-02-26 RX ADMIN — FLUOXETINE HYDROCHLORIDE 20 MG: 20 CAPSULE ORAL at 10:02

## 2024-02-26 RX ADMIN — ERYTHROMYCIN: 5 OINTMENT OPHTHALMIC at 09:02

## 2024-02-26 RX ADMIN — SEVELAMER CARBONATE 800 MG: 800 TABLET, FILM COATED ORAL at 10:02

## 2024-02-26 RX ADMIN — APIXABAN 5 MG: 5 TABLET, FILM COATED ORAL at 10:02

## 2024-02-26 RX ADMIN — POLYETHYLENE GLYCOL 3350 17 G: 17 POWDER, FOR SOLUTION ORAL at 10:02

## 2024-02-26 RX ADMIN — KETOROLAC TROMETHAMINE 1 DROP: 5 SOLUTION/ DROPS OPHTHALMIC at 10:02

## 2024-02-26 RX ADMIN — BUSPIRONE HYDROCHLORIDE 10 MG: 10 TABLET ORAL at 09:02

## 2024-02-26 RX ADMIN — ISOSORBIDE MONONITRATE 30 MG: 30 TABLET, EXTENDED RELEASE ORAL at 10:02

## 2024-02-26 RX ADMIN — GABAPENTIN 600 MG: 300 CAPSULE ORAL at 10:02

## 2024-02-26 RX ADMIN — CARVEDILOL 25 MG: 12.5 TABLET, FILM COATED ORAL at 10:02

## 2024-02-26 RX ADMIN — TIMOLOL MALEATE 1 DROP: 5 SOLUTION OPHTHALMIC at 10:02

## 2024-02-26 RX ADMIN — PREDNISOLONE ACETATE 1 DROP: 10 SUSPENSION/ DROPS OPHTHALMIC at 03:02

## 2024-02-26 RX ADMIN — SUCRALFATE 1 G: 1 SUSPENSION ORAL at 10:02

## 2024-02-26 RX ADMIN — LEVETIRACETAM 500 MG: 500 TABLET, FILM COATED ORAL at 10:02

## 2024-02-26 RX ADMIN — BRIMONIDINE TARTRATE 1 DROP: 1.5 SOLUTION/ DROPS OPHTHALMIC at 10:02

## 2024-02-26 NOTE — NURSING
Nurses Note -- 4 Eyes      2/26/2024   5:04 AM      Skin assessed during: Admit      [x] No Altered Skin Integrity Present    []Prevention Measures Documented      [] Yes- Altered Skin Integrity Present or Discovered   [] LDA Added if Not in Epic (Describe Wound)   [] New Altered Skin Integrity was Present on Admit and Documented in LDA   [] Wound Image Taken    Wound Care Consulted? No    Attending Nurse:  Mi Woo RN/Staff Member:  sarahi

## 2024-02-26 NOTE — HOSPITAL COURSE
Patient admitted w/ severe eye pain, found to have elevated IOP, now s/p AC tap and drain. Patient started on various medicated eye drops in order to decrease IOP, as AC tap only temporizing measure. Patient continued to complain of eye pain, increased pain medication frequency, discussed with ophthalmology, and patient should continue on current regimen. Per ophthalmology, patient will need to continue current eye drop regimen and to follow-up with a glaucoma, and retinal specialist in [place]. Spoke to step-mother, who stated that she has already been contacted about following up. 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 Patient and caregiver understand the follow-up plan. Patient deemed medically stable for discharge.

## 2024-02-26 NOTE — ASSESSMENT & PLAN NOTE
Aspart 5u TIDWM  Gabapentin 600 bid for neuropathy     Patient w/ hypoglycemic episode this am, holding home lantus

## 2024-02-26 NOTE — PROGRESS NOTES
[facility]  Progress Note    Patient Name: Tabby Howard  MRN: [id number]  Patient Class: IP- Inpatient   Admission Date: 2/24/2024  Length of Stay: 0 days  Attending Physician: Christiano Nair III*  Primary Care Provider: Melony Kim NP        Subjective:     Principal Problem:Acute glaucoma of left eye        HPI:  Patient is a 34F with HTN, T2DM c/b gastroparesis, sickle cell trait, SVT, and ESRD on HD (TTS), here for severe pain following retinal detachment surgery 2 weeks ago. Patient transferred from [facility] for an emergent Ophthalmology consult. Reportedly, the patient is approximately 2 weeks status post left eye surgery for retinal detachment performed at [facility]. Did complete follow-up with her glaucoma specialist, had progressive eye pain that was initially managed with eye drops, however progressed to an intolerable pain level for which she sought care at the ED. Associated decreased L visual acuity. Underwent ophthalmology eval on 2/24, had acute glaucoma with increased IOP 80. IOP on presentation 20//79 improved to 13 after AC tap. IV Diamox given for IOP, Dorzolamide, brimonidine, and timolol given for IOP. Continued to have pain despite intervention and reduction of IOP.     In ED, /110, no hypoxia (on 2L NC initially, however no desaturation recorded), afebrile, no tachycardia. Received fentanyl for pain control. Underwent AC tap with reduction in IOP. CBC notable for thrombocytopenia (PLT 99), no leukocytosis, stable anemia (Hb 10.7). CMP c/w ESRD, no severe lyte abnormalities. Hyperglycemic to 295. Procal elevated 0.5, troponin normal.     Overview/Hospital Course:  Patient admitted w/ severe eye pain, found to have elevated IOP, now s/p AC tap and drain. Patient started on various medicated eye drops in order to decrease IOP, as AC tap only temporizing measure. Patient continued to complain of eye pain,  increased pain medication frequency, discussed with ophthalmology, and patient should continue on current regimen    Interval History: Patient had low BG this morning, per nursing staff, patient did eat dinner last night. Patient seen on rounds, and very tearful, complaining of severe pain of left eye. Patient states it is similar pain from prior and that the AC tap only relieved pain temporarily. Discussing case with ophthalmology     Review of Systems   Constitutional:  Negative for fever.   HENT:  Negative for sore throat.    Eyes:  Positive for photophobia and visual disturbance.        Left eye pain, swelling, blurry vision, tearing unchanged from prior   Respiratory:  Negative for cough, shortness of breath and wheezing.    Cardiovascular:  Negative for chest pain, palpitations and leg swelling.   Gastrointestinal:  Negative for abdominal pain, constipation, diarrhea, nausea and vomiting.   Genitourinary:  Negative for dysuria and urgency.   Musculoskeletal:  Negative for myalgias.   Skin:  Negative for rash.   Neurological:  Positive for headaches. Negative for speech difficulty and weakness.   Psychiatric/Behavioral:  Negative for confusion.      Objective:     Vital Signs (Most Recent):  Temp: 97.2 °F (36.2 °C) (02/26/24 1241)  Pulse: 76 (02/26/24 1241)  Resp: 16 (02/26/24 1241)  BP: 124/71 (02/26/24 1241)  SpO2: 98 % (02/26/24 1241) Vital Signs (24h Range):  Temp:  [97.2 °F (36.2 °C)-99 °F (37.2 °C)] 97.2 °F (36.2 °C)  Pulse:  [74-94] 76  Resp:  [16-20] 16  SpO2:  [92 %-100 %] 98 %  BP: (113-167)/(69-89) 124/71     Weight: 53.1 kg (117 lb)  Body mass index is 21.4 kg/m².    Intake/Output Summary (Last 24 hours) at 2/26/2024 1354  Last data filed at 2/26/2024 0510  Gross per 24 hour   Intake 240 ml   Output 100 ml   Net 140 ml         Physical Exam  HENT:      Head: Normocephalic and atraumatic.      Right Ear: External ear normal.      Left Ear: External ear normal.   Eyes:      Comments: Left eye  conjunctival injection, mid-dilated pupil, tearing   Cardiovascular:      Rate and Rhythm: Normal rate and regular rhythm.      Pulses: Normal pulses.   Pulmonary:      Effort: Pulmonary effort is normal.      Breath sounds: Normal breath sounds.   Abdominal:      General: Abdomen is flat.      Palpations: Abdomen is soft.   Musculoskeletal:         General: Normal range of motion.   Skin:     General: Skin is warm.      Capillary Refill: Capillary refill takes less than 2 seconds.   Neurological:      General: No focal deficit present.      Mental Status: She is oriented to person, place, and time.             Significant Labs: All pertinent labs within the past 24 hours have been reviewed.  CBC:   Recent Labs   Lab 02/25/24 0247 02/26/24  0416 02/26/24  1305   WBC 4.58 4.22 3.45*   HGB 10.7* 8.7* 8.4*   HCT 31.5* 25.7* 25.2*   PLT 99* 49* 49*     CMP:   Recent Labs   Lab 02/25/24 0247 02/26/24  0416    140   K 3.7 4.3   CL 98 100   CO2 28 27   * 43*   BUN 13 24*   CREATININE 4.8* 6.6*   CALCIUM 9.1 8.7   PROT 6.7 6.1   ALBUMIN 3.4* 3.1*   BILITOT 1.1* 0.9   ALKPHOS 110 93   AST 13 16   ALT <5* <5*   ANIONGAP 12 13       Significant Imaging: I have reviewed all pertinent imaging results/findings within the past 24 hours.    Assessment/Plan:      * Acute glaucoma of left eye  Evaluated by ophthalmology who performed AC tap following failure of conservative medical management. Demonstrated IOP 80 with improvement to 13 following procedure and IV diamox as well as timolol.   Having severe pain requiring IV dilaudid for adequate control. Patient still with severe pain despite AC tap, but per ophthalmology, this is only a temporizing measure while waiting for eye drops to take effect.     PLAN:  -- Per Ophtho: Start drops:               Timolol/Dorzolomide (Cosopt) BID OS               Brimonidine TID OS  - Wait 5-10 min in between drops.   - Start Erythromycin mansoor TID OS after drops for 2 days.   - pain  control with Roxicodone and dilaudid, wean as able      Hypertension  Chronic, uncontrolled. Latest blood pressure and vitals reviewed-     Temp:  [98.5 °F (36.9 °C)-98.9 °F (37.2 °C)]   Pulse:  [78-93]   Resp:  [13-21]   BP: (123-190)/()   SpO2:  [96 %-100 %] .   Home meds for hypertension were reviewed and noted below.   Hypertension Medications               amLODIPine (NORVASC) 5 MG tablet Take 1 tablet (5 mg total) by mouth once daily.    amLODIPine (NORVASC) 5 MG tablet Take 1 tablet every day by oral route for 90 days.    carvediloL (COREG) 25 MG tablet Take 1 tablet (25 mg total) by mouth 2 (two) times daily.    carvediloL (COREG) 25 MG tablet Take 1 tablet twice a day by oral route for 30 days.    hydrALAZINE (APRESOLINE) 100 MG tablet Take 1 tablet (100 mg total) by mouth 2 (two) times daily.    hydrALAZINE (APRESOLINE) 100 MG tablet Take 1 tablet twice a day by oral route for 90 days.    isosorbide mononitrate (IMDUR) 30 MG 24 hr tablet Take 1 tablet (30 mg total) by mouth once daily.    isosorbide mononitrate (IMDUR) 30 MG 24 hr tablet Take 1 tablet every day by oral route for 90 days.            While in the hospital, will manage blood pressure as follows; Continue home antihypertensive regimen    Will utilize p.r.n. blood pressure medication only if patient's blood pressure greater than 220/110 and she develops symptoms such as worsening chest pain or shortness of breath.    Anemia in ESRD (end-stage renal disease)  Patient's anemia is currently controlled. Has not received any PRBCs to date. Etiology likely d/t chronic disease due to Chronic Kidney Disease/ESRD  Current CBC reviewed-   Lab Results   Component Value Date    HGB 8.4 (L) 02/26/2024    HCT 25.2 (L) 02/26/2024     Monitor serial CBC and transfuse if patient becomes hemodynamically unstable, symptomatic or H/H drops below 7/21.    Type 2 diabetes mellitus with hyperglycemia, with long-term current use of insulin, previous HbA1c  5.8%  Aspart 5u TIDWM  Gabapentin 600 bid for neuropathy     Patient w/ hypoglycemic episode this am, holding home lantus      Adjustment disorder  Continue SSRI      Thrombocytopenia  Patient was found to have thrombocytopenia, the likely etiology is primary from bone marrow suppression 2/2 chronic illness (ESRD), will monitor the platelets Daily. Will transfuse if platelet count is <50k (if undergoing surgical procedure or have active bleeding). Hold DVT prophylaxis if platelets are <50k. The patient's platelet results have been reviewed and are listed below.  Recent Labs   Lab 02/26/24  1305   PLT 49*           Deep vein thrombosis (DVT) of non-extremity vein  Patient w/ acute drop in hgb, but denied any changes in bowel habits  Holding eliquis given acute drop      ESRD (end stage renal disease)  Creatine stable for now. BMP reviewed- noted Estimated Creatinine Clearance: 12.9 mL/min (A) (based on SCr of 4.8 mg/dL (H)). according to latest data. Based on current GFR, CKD stage is end stage.  Monitor UOP and serial BMP and adjust therapy as needed. Renally dose meds. Avoid nephrotoxic medications and procedures.    HD TTS, CTM CMP   Continue Sevelamer     Seizure disorder  Continue Keppra        VTE Risk Mitigation (From admission, onward)           Ordered     IP VTE HIGH RISK PATIENT  Once         02/25/24 0530     Place sequential compression device  Until discontinued         02/25/24 0530                    Discharge Planning   TATIANA: 2/27/2024     Code Status: Full Code   Is the patient medically ready for discharge?: No    Reason for patient still in hospital (select all that apply): Patient trending condition                     Maxwell Farah MD  Department of Hospital Medicine   [facility]

## 2024-02-26 NOTE — SUBJECTIVE & OBJECTIVE
Past Medical History:   Diagnosis Date    ESRD on hemodialysis 2022    Gastritis 2022    EGD was 21    Gastroparesis 2022    has not had Emptying study    Heart failure with preserved ejection fraction 2022    EF 55% on 3/22    History of supraventricular tachycardia     Hyperkalemia 2022    Hypertensive emergency 2022    Sickle cell trait 2022    Type 2 diabetes mellitus        Past Surgical History:   Procedure Laterality Date     SECTION      x 3    COLONOSCOPY      COLONOSCOPY N/A 2022    Procedure: COLONOSCOPY;  Surgeon: Jagdeep Cedeno MD;  Location: [facility];  Service: Endoscopy;  Laterality: N/A;    ENDOSCOPIC ULTRASOUND OF UPPER GASTROINTESTINAL TRACT N/A 2023    Procedure: ULTRASOUND, UPPER GI TRACT, ENDOSCOPIC;  Surgeon: Micky Paredes III, MD;  Location: [facility];  Service: Endoscopy;  Laterality: N/A;    ESOPHAGOGASTRODUODENOSCOPY N/A 10/18/2019    Procedure: ESOPHAGOGASTRODUODENOSCOPY (EGD);  Surgeon: Gianluca Mendez MD;  Location: [facility];  Service: Endoscopy;  Laterality: N/A;    ESOPHAGOGASTRODUODENOSCOPY N/A 2022    Procedure: EGD (ESOPHAGOGASTRODUODENOSCOPY);  Surgeon: Micky Paredes III, MD;  Location: [facility];  Service: Endoscopy;  Laterality: N/A;    ESOPHAGOGASTRODUODENOSCOPY N/A 2022    Procedure: EGD (ESOPHAGOGASTRODUODENOSCOPY);  Surgeon: Marcelo Zhong MD;  Location: [facility] ENDO;  Service: Endoscopy;  Laterality: N/A;    INSERTION, CATHETER, TUNNELED N/A 2023    Procedure: Insertion,catheter,tunneled;  Surgeon: Carlos Thurman Jr., MD;  Location: [facility] OR;  Service: General;  Laterality: N/A;    LAPAROSCOPIC CHOLECYSTECTOMY N/A 2022    Procedure: CHOLECYSTECTOMY, LAPAROSCOPIC;  Surgeon: Grey Perez MD;  Location: [facility] OR;  Service: General;  Laterality: N/A;    PLACEMENT OF DUAL-LUMEN VASCULAR CATHETER Left 2022    Procedure: INSERTION-CATHETER-JOSEPH;  Surgeon: Dionte KATZ  MD Elvia;  Location: [facility] OR;  Service: General;  Laterality: Left;    PLACEMENT OF DUAL-LUMEN VASCULAR CATHETER Right 07/26/2022    Procedure: INSERTION-CATHETER-Hemosplit;  Surgeon: Dionte Gan MD;  Location: [facility] OR;  Service: General;  Laterality: Right;       Review of patient's allergies indicates:   Allergen Reactions    Penicillins Hives     Current Facility-Administered Medications   Medication Frequency    acetaminophen tablet 1,000 mg Q8H PRN    aluminum-magnesium hydroxide-simethicone 200-200-20 mg/5 mL suspension 30 mL QID PRN    amLODIPine tablet 5 mg Daily    atropine 1% ophthalmic solution 1 drop BID    brimonidine 0.15 % OPTH DROP ophthalmic solution 1 drop TID    busPIRone tablet 10 mg TID PRN    carvediloL tablet 25 mg BID    cetirizine tablet 10 mg Daily    dextrose 10% bolus 125 mL 125 mL PRN    dextrose 10% bolus 250 mL 250 mL PRN    dorzolamide 2 % ophthalmic solution 1 drop BID    erythromycin 5 mg/gram (0.5 %) ophthalmic ointment Q8H    FLUoxetine capsule 20 mg Daily    fluticasone propionate 50 mcg/actuation nasal spray 50 mcg Daily    gabapentin capsule 600 mg BID    glucagon (human recombinant) injection 1 mg PRN    glucose chewable tablet 16 g PRN    glucose chewable tablet 24 g PRN    hydrALAZINE tablet 100 mg BID    HYDROmorphone injection 1 mg Q4H PRN    insulin aspart U-100 pen 0-5 Units QID (AC + HS) PRN    isosorbide mononitrate 24 hr tablet 30 mg Daily    ketorolac 0.5% ophthalmic solution 1 drop QID    levETIRAcetam tablet 500 mg BID    melatonin tablet 6 mg Nightly PRN    methazoLAMIDE tablet 100 mg TID    naloxone 0.4 mg/mL injection 0.02 mg PRN    ondansetron disintegrating tablet 8 mg Q8H PRN    oxyCODONE immediate release tablet 5 mg Q4H PRN    polyethylene glycol packet 17 g Daily    prednisoLONE acetate 1 % ophthalmic suspension 1 drop QID    prochlorperazine injection Soln 5 mg Q6H PRN    sevelamer carbonate tablet 800 mg TID WM    simethicone chewable tablet 80 mg  QID PRN    sodium chloride 0.9% flush 10 mL Q12H PRN    sucralfate 100 mg/mL suspension 1 g QID (WM & HS)    timolol maleate 0.5% ophthalmic solution 1 drop BID     Family History       Problem Relation (Age of Onset)    Diabetes Mother, Father          Tobacco Use    Smoking status: Never    Smokeless tobacco: Never   Substance and Sexual Activity    Alcohol use: Not Currently    Drug use: No    Sexual activity: Not Currently     Partners: Male     Birth control/protection: I.U.D.     Review of Systems   Unable to perform ROS: Acuity of condition   Eyes:  Positive for pain and visual disturbance.     Objective:     Vital Signs (Most Recent):  Temp: 99 °F (37.2 °C) (02/26/24 0711)  Pulse: 82 (02/26/24 0711)  Resp: 18 (02/26/24 1023)  BP: 137/78 (02/26/24 1025)  SpO2: 97 % (02/26/24 0711) Vital Signs (24h Range):  Temp:  [98 °F (36.7 °C)-99 °F (37.2 °C)] 99 °F (37.2 °C)  Pulse:  [74-94] 82  Resp:  [10-20] 18  SpO2:  [92 %-100 %] 97 %  BP: (109-167)/(61-89) 137/78     Weight: 53.1 kg (117 lb) (02/24/24 1757)  Body mass index is 21.4 kg/m².  Body surface area is 1.52 meters squared.    I/O last 3 completed shifts:  In: 240 [P.O.:240]  Out: 100 [Emesis/NG output:100]     Physical Exam  Vitals and nursing note reviewed.   Constitutional:       Appearance: She is not toxic-appearing.      Comments: Crying out in pain, however able to answer questions   HENT:      Head: Normocephalic and atraumatic.   Eyes:      Comments: L eye swollen     Cardiovascular:      Rate and Rhythm: Normal rate and regular rhythm.      Arteriovenous access: Left arteriovenous access is present.  Pulmonary:      Effort: Pulmonary effort is normal. No respiratory distress.      Breath sounds: Normal breath sounds.   Abdominal:      General: Abdomen is flat. There is no distension.      Palpations: Abdomen is soft.      Tenderness: There is no abdominal tenderness.   Musculoskeletal:         General: No swelling. Normal range of motion.       Cervical back: Normal range of motion and neck supple.   Skin:     General: Skin is warm and dry.   Neurological:      General: No focal deficit present.      Mental Status: She is alert and oriented to person, place, and time.   Psychiatric:         Mood and Affect: Affect is tearful.          Significant Labs:  CBC:   Recent Labs   Lab 02/26/24 0416   WBC 4.22   RBC 2.84*   HGB 8.7*   HCT 25.7*   PLT 49*   MCV 91   MCH 30.6   MCHC 33.9     CMP:   Recent Labs   Lab 02/26/24 0416   GLU 43*   CALCIUM 8.7   ALBUMIN 3.1*   PROT 6.1      K 4.3   CO2 27      BUN 24*   CREATININE 6.6*   ALKPHOS 93   ALT <5*   AST 16   BILITOT 0.9     All labs within the past 24 hours have been reviewed.

## 2024-02-26 NOTE — HPI
The patient is a 34 y.o. Black or  Female with multiple co morbidities including HTN, T2DM c/b gastroparesis, SVT, and ESRD on HD (TTS), here for severe pain following retinal detachment surgery 2 weeks ago who presents now to ED on 2/24/2024 with complaints of progressive eye pain to an OSH. She was transferred from [facility] for an emergent Ophthalmology consult. The patient is sp L eye surgery for rentinal detachment performed at [facility]. She was being managed with eye drops.      In ED, /110, no hypoxia (on 2L NC initially, however no desaturation recorded), afebrile, no tachycardia. Received fentanyl for pain control. Underwent AC tap with reduction in IOP. CBC notable for thrombocytopenia (PLT 99), no leukocytosis, stable anemia (Hb 10.7). CMP c/w ESRD, no severe lyte abnormalities. Hyperglycemic to 295. Procal elevated 0.5, troponin normal. Last HD Saturday, 2/24. Nephrology consulted for management of ESRD and HD treatment.

## 2024-02-26 NOTE — ASSESSMENT & PLAN NOTE
Patient w/ acute drop in hgb, but denied any changes in bowel habits  Holding eliquis given acute drop

## 2024-02-26 NOTE — CONSULTS
[facility] Surg  Nephrology  Consult Note    Patient Name: Tabby Howard  MRN: [id number]  Admission Date: 2/24/2024  Hospital Length of Stay: 0 days  Attending Provider: Christiano Nair III*   Primary Care Physician: Melony Kim NP  Principal Problem:Acute glaucoma of left eye    Inpatient consult to Nephrology  Consult performed by: Goldie Zelaya, DNP, FNP-C  Consult ordered by: Maxwell Farah MD  Reason for consult: ESRD        Subjective:     HPI: The patient is a 34 y.o. Black or  Female with multiple co morbidities including HTN, T2DM c/b gastroparesis, SVT, and ESRD on HD (TTS), here for severe pain following retinal detachment surgery 2 weeks ago who presents now to ED on 2/24/2024 with complaints of progressive eye pain to an OSH. She was transferred from [facility] for an emergent Ophthalmology consult. The patient is sp L eye surgery for rentinal detachment performed at [facility]. She was being managed with eye drops.      In ED, /110, no hypoxia (on 2L NC initially, however no desaturation recorded), afebrile, no tachycardia. Received fentanyl for pain control. Underwent AC tap with reduction in IOP. CBC notable for thrombocytopenia (PLT 99), no leukocytosis, stable anemia (Hb 10.7). CMP c/w ESRD, no severe lyte abnormalities. Hyperglycemic to 295. Procal elevated 0.5, troponin normal. Last HD Saturday, 2/24. Nephrology consulted for management of ESRD and HD treatment.       Past Medical History:   Diagnosis Date    ESRD on hemodialysis 04/12/2022    Gastritis 12/2022    EGD was 12/5/22    Gastroparesis 04/12/2022    has not had Emptying study    Heart failure with preserved ejection fraction 04/12/2022    EF 55% on 3/22    History of supraventricular tachycardia     Hyperkalemia 07/07/2022    Hypertensive emergency 07/08/2022    Sickle cell trait 04/12/2022    Type 2 diabetes mellitus        Past Surgical History:   Procedure  Laterality Date     SECTION      x 3    COLONOSCOPY      COLONOSCOPY N/A 2022    Procedure: COLONOSCOPY;  Surgeon: Jagdeep Cedeno MD;  Location: [facility] ENDO;  Service: Endoscopy;  Laterality: N/A;    ENDOSCOPIC ULTRASOUND OF UPPER GASTROINTESTINAL TRACT N/A 2023    Procedure: ULTRASOUND, UPPER GI TRACT, ENDOSCOPIC;  Surgeon: Micky Paredes III, MD;  Location: [facility] ENDO;  Service: Endoscopy;  Laterality: N/A;    ESOPHAGOGASTRODUODENOSCOPY N/A 10/18/2019    Procedure: ESOPHAGOGASTRODUODENOSCOPY (EGD);  Surgeon: Gianluca Mendez MD;  Location: [facility];  Service: Endoscopy;  Laterality: N/A;    ESOPHAGOGASTRODUODENOSCOPY N/A 2022    Procedure: EGD (ESOPHAGOGASTRODUODENOSCOPY);  Surgeon: Micky Paredes III, MD;  Location: [facility];  Service: Endoscopy;  Laterality: N/A;    ESOPHAGOGASTRODUODENOSCOPY N/A 2022    Procedure: EGD (ESOPHAGOGASTRODUODENOSCOPY);  Surgeon: Marcelo Zhong MD;  Location: [facility] ENDO;  Service: Endoscopy;  Laterality: N/A;    INSERTION, CATHETER, TUNNELED N/A 2023    Procedure: Insertion,catheter,tunneled;  Surgeon: Carlos Thurman Jr., MD;  Location: [facility] OR;  Service: General;  Laterality: N/A;    LAPAROSCOPIC CHOLECYSTECTOMY N/A 2022    Procedure: CHOLECYSTECTOMY, LAPAROSCOPIC;  Surgeon: Grey Perez MD;  Location: [facility] OR;  Service: General;  Laterality: N/A;    PLACEMENT OF DUAL-LUMEN VASCULAR CATHETER Left 2022    Procedure: INSERTION-CATHETER-JOSEPH;  Surgeon: Dionte Gan MD;  Location: [facility] OR;  Service: General;  Laterality: Left;    PLACEMENT OF DUAL-LUMEN VASCULAR CATHETER Right 2022    Procedure: INSERTION-CATHETER-Hemosplit;  Surgeon: Dionte Gan MD;  Location: [facility] OR;  Service: General;  Laterality: Right;       Review of patient's allergies indicates:   Allergen Reactions    Penicillins Hives     Current Facility-Administered Medications   Medication Frequency    acetaminophen tablet 1,000 mg Q8H PRN     aluminum-magnesium hydroxide-simethicone 200-200-20 mg/5 mL suspension 30 mL QID PRN    amLODIPine tablet 5 mg Daily    atropine 1% ophthalmic solution 1 drop BID    brimonidine 0.15 % OPTH DROP ophthalmic solution 1 drop TID    busPIRone tablet 10 mg TID PRN    carvediloL tablet 25 mg BID    cetirizine tablet 10 mg Daily    dextrose 10% bolus 125 mL 125 mL PRN    dextrose 10% bolus 250 mL 250 mL PRN    dorzolamide 2 % ophthalmic solution 1 drop BID    erythromycin 5 mg/gram (0.5 %) ophthalmic ointment Q8H    FLUoxetine capsule 20 mg Daily    fluticasone propionate 50 mcg/actuation nasal spray 50 mcg Daily    gabapentin capsule 600 mg BID    glucagon (human recombinant) injection 1 mg PRN    glucose chewable tablet 16 g PRN    glucose chewable tablet 24 g PRN    hydrALAZINE tablet 100 mg BID    HYDROmorphone injection 1 mg Q4H PRN    insulin aspart U-100 pen 0-5 Units QID (AC + HS) PRN    isosorbide mononitrate 24 hr tablet 30 mg Daily    ketorolac 0.5% ophthalmic solution 1 drop QID    levETIRAcetam tablet 500 mg BID    melatonin tablet 6 mg Nightly PRN    methazoLAMIDE tablet 100 mg TID    naloxone 0.4 mg/mL injection 0.02 mg PRN    ondansetron disintegrating tablet 8 mg Q8H PRN    oxyCODONE immediate release tablet 5 mg Q4H PRN    polyethylene glycol packet 17 g Daily    prednisoLONE acetate 1 % ophthalmic suspension 1 drop QID    prochlorperazine injection Soln 5 mg Q6H PRN    sevelamer carbonate tablet 800 mg TID WM    simethicone chewable tablet 80 mg QID PRN    sodium chloride 0.9% flush 10 mL Q12H PRN    sucralfate 100 mg/mL suspension 1 g QID (WM & HS)    timolol maleate 0.5% ophthalmic solution 1 drop BID     Family History       Problem Relation (Age of Onset)    Diabetes Mother, Father          Tobacco Use    Smoking status: Never    Smokeless tobacco: Never   Substance and Sexual Activity    Alcohol use: Not Currently    Drug use: No    Sexual activity: Not Currently     Partners: Male     Birth  control/protection: I.U.D.     Review of Systems   Unable to perform ROS: Acuity of condition   Eyes:  Positive for pain and visual disturbance.     Objective:     Vital Signs (Most Recent):  Temp: 99 °F (37.2 °C) (02/26/24 0711)  Pulse: 82 (02/26/24 0711)  Resp: 18 (02/26/24 1023)  BP: 137/78 (02/26/24 1025)  SpO2: 97 % (02/26/24 0711) Vital Signs (24h Range):  Temp:  [98 °F (36.7 °C)-99 °F (37.2 °C)] 99 °F (37.2 °C)  Pulse:  [74-94] 82  Resp:  [10-20] 18  SpO2:  [92 %-100 %] 97 %  BP: (109-167)/(61-89) 137/78     Weight: 53.1 kg (117 lb) (02/24/24 1757)  Body mass index is 21.4 kg/m².  Body surface area is 1.52 meters squared.    I/O last 3 completed shifts:  In: 240 [P.O.:240]  Out: 100 [Emesis/NG output:100]     Physical Exam  Vitals and nursing note reviewed.   Constitutional:       Appearance: She is not toxic-appearing.      Comments: Crying out in pain, however able to answer questions   HENT:      Head: Normocephalic and atraumatic.   Eyes:      Comments: L eye swollen     Cardiovascular:      Rate and Rhythm: Normal rate and regular rhythm.      Arteriovenous access: Left arteriovenous access is present.  Pulmonary:      Effort: Pulmonary effort is normal. No respiratory distress.      Breath sounds: Normal breath sounds.   Abdominal:      General: Abdomen is flat. There is no distension.      Palpations: Abdomen is soft.      Tenderness: There is no abdominal tenderness.   Musculoskeletal:         General: No swelling. Normal range of motion.      Cervical back: Normal range of motion and neck supple.   Skin:     General: Skin is warm and dry.   Neurological:      General: No focal deficit present.      Mental Status: She is alert and oriented to person, place, and time.   Psychiatric:         Mood and Affect: Affect is tearful.          Significant Labs:  CBC:   Recent Labs   Lab 02/26/24  0416   WBC 4.22   RBC 2.84*   HGB 8.7*   HCT 25.7*   PLT 49*   MCV 91   MCH 30.6   MCHC 33.9     CMP:   Recent Labs    Lab 02/26/24  0416   GLU 43*   CALCIUM 8.7   ALBUMIN 3.1*   PROT 6.1      K 4.3   CO2 27      BUN 24*   CREATININE 6.6*   ALKPHOS 93   ALT <5*   AST 16   BILITOT 0.9     All labs within the past 24 hours have been reviewed.    Assessment/Plan:     Ophtho  * Acute glaucoma of left eye  - defer to primary team     Renal/  ESRD (end stage renal disease)  34 y.o. Black or  Female ESRD-HD T-T-S  presents to ED on 2/24/2024 with severe L eye pain. Transferred for emergent evaluation by Ophthalmology.  Nephrology consulted for inpatient ESRD-HD management    Outpatient HD Information:  -Dialysis modality: Hemodialysis  -Outpatient HD unit: [facility]  -Nephrologist: Emmanuel SPRINGER  -HD TX days: Tuesday/Thursday/Saturday, duration of treatment: 4 hrs   -Last HD TX prior to hospital admission: 2/24  -Dialysis access: dialysis catheter , + new L AVF (maturing)   -Residual urine: Minium   -EDW: 51 kg     Assessment:   - Dialysis for metabolic clearance and volume management will be provided tomorrow AM.  - Labs reviewed and dialysate to be adjusted to current labs.   - Continue to monitor intake and output  - Please avoid gadolinium, fleets, phos-based laxatives, NSAIDs  - Dialysis thrice weekly unless more urgent indications arise. Will evaluate RRT requirements Daily.    Anemia of ESRD   Recent Labs   Lab 02/24/24  1855 02/25/24  0247 02/26/24  0416   WBC  --  4.58 4.22   HGB  --  10.7* 8.7*   HCT 31* 31.5* 25.7*   PLT  --  99* 49*     Lab Results   Component Value Date    FESATURATED 97 (H) 12/16/2023    FERRITIN 6,013.5 (H) 12/16/2023       - Goal in ESRD is Hgb of 10-11. Hgb 8.7.  - Will review chronic care plan from outpatient dialysis center for SHELLEY dosing.     Mineral Bone Disease in ESRD   Lab Results   Component Value Date    .8 (H) 07/08/2022    CALCIUM 8.7 02/26/2024    ALBUMIN 3.1 (L) 02/26/2024    PHOS 4.9 (H) 02/26/2024       - F/U PO4, Mg, Calcium. And albumin levels  daily.   - Renal diet with protein intake goal 1.5 g/kg/d with 1 L fluid restriction   - Novasource with meals  - Restart home phos binder, phos 4.9        Thank you for your consult. I will follow-up with patient. Please contact us if you have any additional questions.    Goldie Zelaya DNP, FNP-C  Nephrology  [facility] - Med Surg

## 2024-02-26 NOTE — ASSESSMENT & PLAN NOTE
Evaluated by ophthalmology who performed AC tap following failure of conservative medical management. Demonstrated IOP 80 with improvement to 13 following procedure and IV diamox as well as timolol.   Having severe pain requiring IV dilaudid for adequate control. Patient still with severe pain despite AC tap, but per ophthalmology, this is only a temporizing measure while waiting for eye drops to take effect.     PLAN:  -- Per Ophtho: Start drops:               Timolol/Dorzolomide (Cosopt) BID OS               Brimonidine TID OS  - Wait 5-10 min in between drops.   - Start Erythromycin mansoor TID OS after drops for 2 days.   - pain control with Roxicodone and dilaudid, wean as able

## 2024-02-26 NOTE — SUBJECTIVE & OBJECTIVE
Interval History: Patient had low BG this morning, per nursing staff, patient did eat dinner last night. Patient seen on rounds, and very tearful, complaining of severe pain of left eye. Patient states it is similar pain from prior and that the AC tap only relieved pain temporarily. Discussing case with ophthalmology     Review of Systems   Constitutional:  Negative for fever.   HENT:  Negative for sore throat.    Eyes:  Positive for photophobia and visual disturbance.        Left eye pain, swelling, blurry vision, tearing unchanged from prior   Respiratory:  Negative for cough, shortness of breath and wheezing.    Cardiovascular:  Negative for chest pain, palpitations and leg swelling.   Gastrointestinal:  Negative for abdominal pain, constipation, diarrhea, nausea and vomiting.   Genitourinary:  Negative for dysuria and urgency.   Musculoskeletal:  Negative for myalgias.   Skin:  Negative for rash.   Neurological:  Positive for headaches. Negative for speech difficulty and weakness.   Psychiatric/Behavioral:  Negative for confusion.      Objective:     Vital Signs (Most Recent):  Temp: 97.2 °F (36.2 °C) (02/26/24 1241)  Pulse: 76 (02/26/24 1241)  Resp: 16 (02/26/24 1241)  BP: 124/71 (02/26/24 1241)  SpO2: 98 % (02/26/24 1241) Vital Signs (24h Range):  Temp:  [97.2 °F (36.2 °C)-99 °F (37.2 °C)] 97.2 °F (36.2 °C)  Pulse:  [74-94] 76  Resp:  [16-20] 16  SpO2:  [92 %-100 %] 98 %  BP: (113-167)/(69-89) 124/71     Weight: 53.1 kg (117 lb)  Body mass index is 21.4 kg/m².    Intake/Output Summary (Last 24 hours) at 2/26/2024 1354  Last data filed at 2/26/2024 0510  Gross per 24 hour   Intake 240 ml   Output 100 ml   Net 140 ml         Physical Exam  HENT:      Head: Normocephalic and atraumatic.      Right Ear: External ear normal.      Left Ear: External ear normal.   Eyes:      Comments: Left eye conjunctival injection, mid-dilated pupil, tearing   Cardiovascular:      Rate and Rhythm: Normal rate and regular rhythm.       Pulses: Normal pulses.   Pulmonary:      Effort: Pulmonary effort is normal.      Breath sounds: Normal breath sounds.   Abdominal:      General: Abdomen is flat.      Palpations: Abdomen is soft.   Musculoskeletal:         General: Normal range of motion.   Skin:     General: Skin is warm.      Capillary Refill: Capillary refill takes less than 2 seconds.   Neurological:      General: No focal deficit present.      Mental Status: She is oriented to person, place, and time.             Significant Labs: All pertinent labs within the past 24 hours have been reviewed.  CBC:   Recent Labs   Lab 02/25/24 0247 02/26/24  0416 02/26/24  1305   WBC 4.58 4.22 3.45*   HGB 10.7* 8.7* 8.4*   HCT 31.5* 25.7* 25.2*   PLT 99* 49* 49*     CMP:   Recent Labs   Lab 02/25/24 0247 02/26/24  0416    140   K 3.7 4.3   CL 98 100   CO2 28 27   * 43*   BUN 13 24*   CREATININE 4.8* 6.6*   CALCIUM 9.1 8.7   PROT 6.7 6.1   ALBUMIN 3.4* 3.1*   BILITOT 1.1* 0.9   ALKPHOS 110 93   AST 13 16   ALT <5* <5*   ANIONGAP 12 13       Significant Imaging: I have reviewed all pertinent imaging results/findings within the past 24 hours.

## 2024-02-26 NOTE — PLAN OF CARE
Pt in Ophthalmology clinic when Sw arrived to complete dc assessment, will follow when Pt returns for dc needs.

## 2024-02-26 NOTE — ASSESSMENT & PLAN NOTE
Patient's anemia is currently controlled. Has not received any PRBCs to date. Etiology likely d/t chronic disease due to Chronic Kidney Disease/ESRD  Current CBC reviewed-   Lab Results   Component Value Date    HGB 8.4 (L) 02/26/2024    HCT 25.2 (L) 02/26/2024     Monitor serial CBC and transfuse if patient becomes hemodynamically unstable, symptomatic or H/H drops below 7/21.

## 2024-02-26 NOTE — ASSESSMENT & PLAN NOTE
34 y.o. Black or  Female ESRD-HD T-T-S  presents to ED on 2/24/2024 with severe L eye pain. Transferred for emergent evaluation by Ophthalmology.  Nephrology consulted for inpatient ESRD-HD management    Outpatient HD Information:  -Dialysis modality: Hemodialysis  -Outpatient HD unit: [facility]  -Nephrologist: Emmanuel SPRINGER  -HD TX days: Tuesday/Thursday/Saturday, duration of treatment: 4 hrs   -Last HD TX prior to hospital admission: 2/24  -Dialysis access: dialysis catheter , + new L AVF (maturing)   -Residual urine: Minium   -EDW: 51 kg     Assessment:   - Dialysis for metabolic clearance and volume management will be provided tomorrow AM.  - Labs reviewed and dialysate to be adjusted to current labs.   - Continue to monitor intake and output  - Please avoid gadolinium, fleets, phos-based laxatives, NSAIDs  - Dialysis thrice weekly unless more urgent indications arise. Will evaluate RRT requirements Daily.    Anemia of ESRD   Recent Labs   Lab 02/24/24  1855 02/25/24  0247 02/26/24  0416   WBC  --  4.58 4.22   HGB  --  10.7* 8.7*   HCT 31* 31.5* 25.7*   PLT  --  99* 49*     Lab Results   Component Value Date    FESATURATED 97 (H) 12/16/2023    FERRITIN 6,013.5 (H) 12/16/2023       - Goal in ESRD is Hgb of 10-11. Hgb 8.7.  - Will review chronic care plan from outpatient dialysis center for SHELLEY dosing.     Mineral Bone Disease in ESRD   Lab Results   Component Value Date    .8 (H) 07/08/2022    CALCIUM 8.7 02/26/2024    ALBUMIN 3.1 (L) 02/26/2024    PHOS 4.9 (H) 02/26/2024       - F/U PO4, Mg, Calcium. And albumin levels daily.   - Renal diet with protein intake goal 1.5 g/kg/d with 1 L fluid restriction   - Novasource with meals  - Restart home phos binder, phos 4.9

## 2024-02-26 NOTE — ASSESSMENT & PLAN NOTE
Patient was found to have thrombocytopenia, the likely etiology is primary from bone marrow suppression 2/2 chronic illness (ESRD), will monitor the platelets Daily. Will transfuse if platelet count is <50k (if undergoing surgical procedure or have active bleeding). Hold DVT prophylaxis if platelets are <50k. The patient's platelet results have been reviewed and are listed below.  Recent Labs   Lab 02/26/24  1305   PLT 49*

## 2024-02-26 NOTE — PROGRESS NOTES
Progress Note  Ophthalmology Service    Date: 02/26/2024    History of Present Illness: Tabby Howard is a 34 y.o. female with history PDR/Vit heme who presented to [facility] for painful vision loss OS for 1 week. Seen by outside ophthalmologist who did retina surgery back in January for her. This Dr. Tried to get her scheduled for glaucoma surgery next week. PT reports her pain is unbearable. Ophthalmology is being consulted to evaluate.      Chief complaint: left eye pain     Interval History:   The patient is still in pain. She states that the AC tap relieved her pain only temporarily. Denies any changes to her right eye      Current eye gtts:   Cosopt BID OS  Brimonidine TID OS    Medications this encounter:    amLODIPine  5 mg Oral Daily    atropine 1%  1 drop Left Eye BID    brimonidine 0.15 % OPTH DROP  1 drop Left Eye TID    carvediloL  25 mg Oral BID    cetirizine  10 mg Oral Daily    dorzolamide  1 drop Left Eye BID    erythromycin   Left Eye Q8H    FLUoxetine  20 mg Oral Daily    fluticasone propionate  1 spray Each Nostril Daily    gabapentin  600 mg Oral BID    hydrALAZINE  100 mg Oral BID    isosorbide mononitrate  30 mg Oral Daily    levETIRAcetam  500 mg Oral BID    methazoLAMIDE  100 mg Oral TID    polyethylene glycol  17 g Oral Daily    prednisoLONE acetate  1 drop Left Eye QID    sevelamer carbonate  800 mg Oral TID WM    sucralfate  1 g Oral QID (WM & HS)    timolol maleate 0.5%  1 drop Left Eye BID       Allergies: is allergic to penicillins.     ROS: As per HPI    Ocular examination/Dilated fundus examination:  Base Eye Exam       Visual Acuity (Snellen - Linear)         Right Left    Dist sc  NLP    Dist ph sc 20/100 -1    Poor effort. Possibly able to read some of 20/70 line.              Tonometry (Tonopen, 9:27 AM)         Right Left    Pressure 19 57              Tonometry #2 (Tonopen, 9:28 AM)         Right Left    Pressure  49              Tonometry #3 (Tonopen, 9:28 AM)         Right Left     Pressure  49              Tonometry #4 (Applanation, 11:00 AM)         Right Left    Pressure 16 36              Neuro/Psych       Oriented x3: Yes    Mood/Affect: Normal                  Slit Lamp and Fundus Exam       External Exam         Right Left    External Normal Normal              Slit Lamp Exam         Right Left    Lids/Lashes Normal Normal    Conjunctiva/Sclera White and quiet injection and chemosis    Cornea Clear MCE difusely    Anterior Chamber Deep and formed 40-50% hyphema, overlying fibrin    Iris round, minimally reactive, no obvious NVI view only superior, appears flat    Lens 1+ Cortical cataract appears phakic, very poor view    Anterior Vitreous Normal no view                    Assessment/Plan:     1. Hyphema OS with elevated IOP likely 2/2 NVG  - VA 20/70 // LP or worse.   - IOP on presentation 20//79 improved to 13 after AC tap  - per patient, had retina surgery in left eye in January, unclear what surgery and with what surgeon this was performed   - 2/25/24: IOP 44, PT resting comfortably in bed.    - on Cosopt and Brimonidine  - + Sickle cell Trait  - 02/26/24: patient in pain again, IOP 49 initially; 36 with applanation  - patient denies being able to see light in left eye  - Discussed with Dr. Faith and Dr. Saez, ok to continue methazolamide at current dose, also add atropine and pred forte as below   - Also discussed with Dr. Priest, who may perform tube shunt surgery in near future depending on patient's clinical course  - ophtho will continue to follow while inpatient     Recommendations:   - Continue drops:               Timolol/Dorzolomide (Cosopt) BID OS              Brimonidine TID OS  - Continue Methazolamide 100mg TID   - START Atropine BID OS  - START Pred Forte QID OS  - STOP Ketorolac  - head of bed elevation at all times  - clear eyeshield while not placing drops  - Wait 5-10 min in between drops.   - RD/return precautions discussed   - Please call the on call  "resident with any questions of worsening exam findings.    - Patient ok to eat dinner 02/26; please make NPO at midnight for 02/27 until re-examined by ophtho      Patient's Best Contact Number: [phone]         Nura Kerr MD (Brad)   [facility] Ophthalmology, PGY-2  02/26/2024  9:15 AM    "

## 2024-02-27 LAB
ALBUMIN SERPL BCP-MCNC: 3.3 G/DL (ref 3.5–5.2)
ALP SERPL-CCNC: 100 U/L (ref 55–135)
ALT SERPL W/O P-5'-P-CCNC: <5 U/L (ref 10–44)
ANION GAP SERPL CALC-SCNC: 15 MMOL/L (ref 8–16)
AST SERPL-CCNC: 14 U/L (ref 10–40)
BASOPHILS # BLD AUTO: 0.03 K/UL (ref 0–0.2)
BASOPHILS # BLD AUTO: 0.04 K/UL (ref 0–0.2)
BASOPHILS NFR BLD: 1.1 % (ref 0–1.9)
BASOPHILS NFR BLD: 1.1 % (ref 0–1.9)
BILIRUB DIRECT SERPL-MCNC: 0.4 MG/DL (ref 0.1–0.3)
BILIRUB SERPL-MCNC: 1.1 MG/DL (ref 0.1–1)
BILIRUB SERPL-MCNC: 1.2 MG/DL (ref 0.1–1)
BUN SERPL-MCNC: 37 MG/DL (ref 6–20)
CALCIUM SERPL-MCNC: 8.5 MG/DL (ref 8.7–10.5)
CHLORIDE SERPL-SCNC: 96 MMOL/L (ref 95–110)
CO2 SERPL-SCNC: 28 MMOL/L (ref 23–29)
CREAT SERPL-MCNC: 8.2 MG/DL (ref 0.5–1.4)
DIFFERENTIAL METHOD BLD: ABNORMAL
DIFFERENTIAL METHOD BLD: ABNORMAL
EOSINOPHIL # BLD AUTO: 0.1 K/UL (ref 0–0.5)
EOSINOPHIL # BLD AUTO: 0.1 K/UL (ref 0–0.5)
EOSINOPHIL NFR BLD: 3.3 % (ref 0–8)
EOSINOPHIL NFR BLD: 4.4 % (ref 0–8)
ERYTHROCYTE [DISTWIDTH] IN BLOOD BY AUTOMATED COUNT: 16.2 % (ref 11.5–14.5)
ERYTHROCYTE [DISTWIDTH] IN BLOOD BY AUTOMATED COUNT: 16.4 % (ref 11.5–14.5)
EST. GFR  (NO RACE VARIABLE): 6 ML/MIN/1.73 M^2
GLUCOSE SERPL-MCNC: 142 MG/DL (ref 70–110)
HAPTOGLOB SERPL-MCNC: <10 MG/DL (ref 30–250)
HBV CORE AB SERPL QL IA: NORMAL
HBV SURFACE AB SER-ACNC: 28.1 MIU/ML
HBV SURFACE AB SER-ACNC: REACTIVE M[IU]/ML
HBV SURFACE AG SERPL QL IA: NORMAL
HCT VFR BLD AUTO: 23.2 % (ref 37–48.5)
HCT VFR BLD AUTO: 23.3 % (ref 37–48.5)
HGB BLD-MCNC: 7.8 G/DL (ref 12–16)
HGB BLD-MCNC: 7.8 G/DL (ref 12–16)
IMM GRANULOCYTES # BLD AUTO: 0.01 K/UL (ref 0–0.04)
IMM GRANULOCYTES # BLD AUTO: 0.01 K/UL (ref 0–0.04)
IMM GRANULOCYTES NFR BLD AUTO: 0.3 % (ref 0–0.5)
IMM GRANULOCYTES NFR BLD AUTO: 0.4 % (ref 0–0.5)
LDH SERPL L TO P-CCNC: 270 U/L (ref 110–260)
LYMPHOCYTES # BLD AUTO: 0.8 K/UL (ref 1–4.8)
LYMPHOCYTES # BLD AUTO: 0.8 K/UL (ref 1–4.8)
LYMPHOCYTES NFR BLD: 22.7 % (ref 18–48)
LYMPHOCYTES NFR BLD: 28.6 % (ref 18–48)
MAGNESIUM SERPL-MCNC: 2.2 MG/DL (ref 1.6–2.6)
MCH RBC QN AUTO: 30.1 PG (ref 27–31)
MCH RBC QN AUTO: 30.7 PG (ref 27–31)
MCHC RBC AUTO-ENTMCNC: 33.5 G/DL (ref 32–36)
MCHC RBC AUTO-ENTMCNC: 33.6 G/DL (ref 32–36)
MCV RBC AUTO: 90 FL (ref 82–98)
MCV RBC AUTO: 91 FL (ref 82–98)
MONOCYTES # BLD AUTO: 0.3 K/UL (ref 0.3–1)
MONOCYTES # BLD AUTO: 0.4 K/UL (ref 0.3–1)
MONOCYTES NFR BLD: 10.6 % (ref 4–15)
MONOCYTES NFR BLD: 11.9 % (ref 4–15)
NEUTROPHILS # BLD AUTO: 1.5 K/UL (ref 1.8–7.7)
NEUTROPHILS # BLD AUTO: 2.2 K/UL (ref 1.8–7.7)
NEUTROPHILS NFR BLD: 54.9 % (ref 38–73)
NEUTROPHILS NFR BLD: 60.7 % (ref 38–73)
NRBC BLD-RTO: 0 /100 WBC
NRBC BLD-RTO: 0 /100 WBC
PATH REV BLD -IMP: NORMAL
PHOSPHATE SERPL-MCNC: 5.1 MG/DL (ref 2.7–4.5)
PLATELET # BLD AUTO: 42 K/UL (ref 150–450)
PLATELET # BLD AUTO: 48 K/UL (ref 150–450)
PMV BLD AUTO: 10.8 FL (ref 9.2–12.9)
PMV BLD AUTO: 11.4 FL (ref 9.2–12.9)
POCT GLUCOSE: 124 MG/DL (ref 70–110)
POCT GLUCOSE: 144 MG/DL (ref 70–110)
POCT GLUCOSE: 159 MG/DL (ref 70–110)
POTASSIUM SERPL-SCNC: 4.2 MMOL/L (ref 3.5–5.1)
PROT SERPL-MCNC: 6.3 G/DL (ref 6–8.4)
RBC # BLD AUTO: 2.54 M/UL (ref 4–5.4)
RBC # BLD AUTO: 2.59 M/UL (ref 4–5.4)
SODIUM SERPL-SCNC: 139 MMOL/L (ref 136–145)
WBC # BLD AUTO: 2.73 K/UL (ref 3.9–12.7)
WBC # BLD AUTO: 3.62 K/UL (ref 3.9–12.7)

## 2024-02-27 PROCEDURE — 11000001 HC ACUTE MED/SURG PRIVATE ROOM

## 2024-02-27 PROCEDURE — 86704 HEP B CORE ANTIBODY TOTAL: CPT | Performed by: FAMILY MEDICINE

## 2024-02-27 PROCEDURE — 80100016 HC MAINTENANCE HEMODIALYSIS

## 2024-02-27 PROCEDURE — 80053 COMPREHEN METABOLIC PANEL: CPT

## 2024-02-27 PROCEDURE — 85025 COMPLETE CBC W/AUTO DIFF WBC: CPT | Mod: 91

## 2024-02-27 PROCEDURE — 83735 ASSAY OF MAGNESIUM: CPT

## 2024-02-27 PROCEDURE — 25000003 PHARM REV CODE 250: Performed by: NURSE PRACTITIONER

## 2024-02-27 PROCEDURE — 25000003 PHARM REV CODE 250

## 2024-02-27 PROCEDURE — 36415 COLL VENOUS BLD VENIPUNCTURE: CPT

## 2024-02-27 PROCEDURE — 82248 BILIRUBIN DIRECT: CPT

## 2024-02-27 PROCEDURE — 36415 COLL VENOUS BLD VENIPUNCTURE: CPT | Mod: XB

## 2024-02-27 PROCEDURE — 86706 HEP B SURFACE ANTIBODY: CPT | Performed by: FAMILY MEDICINE

## 2024-02-27 PROCEDURE — 87340 HEPATITIS B SURFACE AG IA: CPT | Performed by: FAMILY MEDICINE

## 2024-02-27 PROCEDURE — 85025 COMPLETE CBC W/AUTO DIFF WBC: CPT

## 2024-02-27 PROCEDURE — 83010 ASSAY OF HAPTOGLOBIN QUANT: CPT

## 2024-02-27 PROCEDURE — 85060 BLOOD SMEAR INTERPRETATION: CPT | Mod: ,,, | Performed by: PATHOLOGY

## 2024-02-27 PROCEDURE — 82247 BILIRUBIN TOTAL: CPT

## 2024-02-27 PROCEDURE — 90935 HEMODIALYSIS ONE EVALUATION: CPT | Mod: ,,, | Performed by: NURSE PRACTITIONER

## 2024-02-27 PROCEDURE — 83615 LACTATE (LD) (LDH) ENZYME: CPT

## 2024-02-27 PROCEDURE — 84100 ASSAY OF PHOSPHORUS: CPT

## 2024-02-27 PROCEDURE — 5A1D70Z PERFORMANCE OF URINARY FILTRATION, INTERMITTENT, LESS THAN 6 HOURS PER DAY: ICD-10-PCS | Performed by: NURSE PRACTITIONER

## 2024-02-27 RX ORDER — TIMOLOL MALEATE 5 MG/ML
1 SOLUTION/ DROPS OPHTHALMIC 2 TIMES DAILY
Status: DISCONTINUED | OUTPATIENT
Start: 2024-02-27 | End: 2024-02-28 | Stop reason: HOSPADM

## 2024-02-27 RX ORDER — BRIMONIDINE TARTRATE 1.5 MG/ML
1 SOLUTION/ DROPS OPHTHALMIC 3 TIMES DAILY
Status: DISCONTINUED | OUTPATIENT
Start: 2024-02-27 | End: 2024-02-28 | Stop reason: HOSPADM

## 2024-02-27 RX ORDER — SODIUM CHLORIDE 9 MG/ML
INJECTION, SOLUTION INTRAVENOUS ONCE
Status: COMPLETED | OUTPATIENT
Start: 2024-02-27 | End: 2024-02-27

## 2024-02-27 RX ORDER — DORZOLAMIDE HCL 20 MG/ML
1 SOLUTION/ DROPS OPHTHALMIC 2 TIMES DAILY
Status: DISCONTINUED | OUTPATIENT
Start: 2024-02-27 | End: 2024-02-28 | Stop reason: HOSPADM

## 2024-02-27 RX ADMIN — ATROPINE SULFATE 1 DROP: 10 SOLUTION/ DROPS OPHTHALMIC at 10:02

## 2024-02-27 RX ADMIN — BRIMONIDINE TARTRATE 1 DROP: 1.5 SOLUTION/ DROPS OPHTHALMIC at 09:02

## 2024-02-27 RX ADMIN — OXYCODONE 5 MG: 5 TABLET ORAL at 08:02

## 2024-02-27 RX ADMIN — SODIUM CHLORIDE: 9 INJECTION, SOLUTION INTRAVENOUS at 09:02

## 2024-02-27 RX ADMIN — BRIMONIDINE TARTRATE 1 DROP: 1.5 SOLUTION/ DROPS OPHTHALMIC at 03:02

## 2024-02-27 RX ADMIN — PREDNISOLONE ACETATE 1 DROP: 10 SUSPENSION/ DROPS OPHTHALMIC at 08:02

## 2024-02-27 RX ADMIN — OXYCODONE 5 MG: 5 TABLET ORAL at 12:02

## 2024-02-27 RX ADMIN — AMLODIPINE BESYLATE 5 MG: 5 TABLET ORAL at 08:02

## 2024-02-27 RX ADMIN — PREDNISOLONE ACETATE 1 DROP: 10 SUSPENSION/ DROPS OPHTHALMIC at 03:02

## 2024-02-27 RX ADMIN — DORZOLAMIDE HCL 1 DROP: 20 SOLUTION/ DROPS OPHTHALMIC at 08:02

## 2024-02-27 RX ADMIN — ATROPINE SULFATE 1 DROP: 10 SOLUTION/ DROPS OPHTHALMIC at 02:02

## 2024-02-27 RX ADMIN — LEVETIRACETAM 500 MG: 500 TABLET, FILM COATED ORAL at 08:02

## 2024-02-27 RX ADMIN — METHAZOLAMIDE 100 MG: 50 TABLET ORAL at 10:02

## 2024-02-27 RX ADMIN — ACETAMINOPHEN 1000 MG: 500 TABLET ORAL at 11:02

## 2024-02-27 RX ADMIN — PREDNISOLONE ACETATE 1 DROP: 10 SUSPENSION/ DROPS OPHTHALMIC at 06:02

## 2024-02-27 RX ADMIN — ONDANSETRON 8 MG: 8 TABLET, ORALLY DISINTEGRATING ORAL at 08:02

## 2024-02-27 RX ADMIN — BUSPIRONE HYDROCHLORIDE 10 MG: 10 TABLET ORAL at 10:02

## 2024-02-27 RX ADMIN — HYDRALAZINE HYDROCHLORIDE 100 MG: 50 TABLET ORAL at 10:02

## 2024-02-27 RX ADMIN — LEVETIRACETAM 500 MG: 500 TABLET, FILM COATED ORAL at 10:02

## 2024-02-27 RX ADMIN — DORZOLAMIDE HCL 1 DROP: 20 SOLUTION/ DROPS OPHTHALMIC at 10:02

## 2024-02-27 RX ADMIN — SEVELAMER CARBONATE 800 MG: 800 TABLET, FILM COATED ORAL at 04:02

## 2024-02-27 RX ADMIN — BRIMONIDINE TARTRATE 1 DROP: 1.5 SOLUTION OPHTHALMIC at 10:02

## 2024-02-27 RX ADMIN — GABAPENTIN 600 MG: 300 CAPSULE ORAL at 08:02

## 2024-02-27 RX ADMIN — CARVEDILOL 25 MG: 12.5 TABLET, FILM COATED ORAL at 10:02

## 2024-02-27 RX ADMIN — TIMOLOL MALEATE 1 DROP: 5 SOLUTION OPHTHALMIC at 08:02

## 2024-02-27 RX ADMIN — SUCRALFATE 1 G: 1 SUSPENSION ORAL at 10:02

## 2024-02-27 RX ADMIN — CETIRIZINE HYDROCHLORIDE 10 MG: 10 TABLET, FILM COATED ORAL at 08:02

## 2024-02-27 RX ADMIN — CARVEDILOL 25 MG: 12.5 TABLET, FILM COATED ORAL at 08:02

## 2024-02-27 RX ADMIN — METHAZOLAMIDE 100 MG: 50 TABLET ORAL at 03:02

## 2024-02-27 RX ADMIN — Medication 6 MG: at 10:02

## 2024-02-27 RX ADMIN — TIMOLOL MALEATE 1 DROP: 5 SOLUTION/ DROPS OPHTHALMIC at 10:02

## 2024-02-27 RX ADMIN — ISOSORBIDE MONONITRATE 30 MG: 30 TABLET, EXTENDED RELEASE ORAL at 08:02

## 2024-02-27 RX ADMIN — GABAPENTIN 600 MG: 300 CAPSULE ORAL at 10:02

## 2024-02-27 RX ADMIN — HYDRALAZINE HYDROCHLORIDE 100 MG: 50 TABLET ORAL at 08:02

## 2024-02-27 RX ADMIN — PREDNISOLONE ACETATE 1 DROP: 10 SUSPENSION/ DROPS OPHTHALMIC at 10:02

## 2024-02-27 RX ADMIN — FLUOXETINE HYDROCHLORIDE 20 MG: 20 CAPSULE ORAL at 08:02

## 2024-02-27 RX ADMIN — SUCRALFATE 1 G: 1 SUSPENSION ORAL at 04:02

## 2024-02-27 RX ADMIN — METHAZOLAMIDE 100 MG: 50 TABLET ORAL at 08:02

## 2024-02-27 NOTE — ASSESSMENT & PLAN NOTE
Aspart 5u TIDWM  Gabapentin 600 bid for neuropathy     Patient BG wnl without insulin in past 24hrs holding home lantus

## 2024-02-27 NOTE — PLAN OF CARE
Problem: Adult Inpatient Plan of Care  Goal: Plan of Care Review  Outcome: Ongoing, Progressing  Goal: Patient-Specific Goal (Individualized)  Outcome: Ongoing, Progressing  Goal: Absence of Hospital-Acquired Illness or Injury  Outcome: Ongoing, Progressing  Goal: Optimal Comfort and Wellbeing  Outcome: Ongoing, Progressing  Goal: Readiness for Transition of Care  Outcome: Ongoing, Progressing     Problem: Diabetes Comorbidity  Goal: Blood Glucose Level Within Targeted Range  Outcome: Ongoing, Progressing     Problem: Infection  Goal: Absence of Infection Signs and Symptoms  Outcome: Ongoing, Progressing     Problem: Pain Acute  Goal: Acceptable Pain Control and Functional Ability  Outcome: Ongoing, Progressing     Problem: Nausea and Vomiting  Goal: Fluid and Electrolyte Balance  Outcome: Ongoing, Progressing     Problem: Fall Injury Risk  Goal: Absence of Fall and Fall-Related Injury  Outcome: Ongoing, Progressing

## 2024-02-27 NOTE — ASSESSMENT & PLAN NOTE
Evaluated by ophthalmology who performed AC tap following failure of conservative medical management. Demonstrated IOP 80 with improvement to 13 following procedure and IV diamox as well as timolol.   Having severe pain requiring IV dilaudid for adequate control. Patient still with severe pain despite AC tap, but per ophthalmology, this is only a temporizing measure while waiting for eye drops to take effect. Patient NPO 2/27 for potential procedure    PLAN:  -- Per Ophtho: Start drops:               Timolol/Dorzolomide (Cosopt) BID OS               Brimonidine TID OS  - Wait 5-10 min in between drops.   - Start Erythromycin mansoor TID OS after drops for 2 days.   - pain control with Roxicodone and dilaudid, wean as able     Patient / Daughter requested results / recommendations of All test done yesterday.

## 2024-02-27 NOTE — PROGRESS NOTES
[facility] HEMODIALYSIS NOTE     Patient currently on hemodialysis for removal of uremic toxins .     Patient seen and evaluated on hemodialysis, tolerating treatment, see HD flowsheet for vitals and assessments.      No Hypotension, chest pain, shortness of breath, cramping, nausea or vomiting.     Target UF: 2 L as tolerated.   Pre HD weight 53 kg (EDW 51 kg). UFR adjusted.   Ophthalmology following.  Hgb 7.8 - EPO with HD.  Phos 5.1 - continue Sevelamer.     CHELY Zelaya DNP, APRN, FNP-C  Department of Nephrology  [facility]  Pager: [phone]

## 2024-02-27 NOTE — PLAN OF CARE
Problem: Adult Inpatient Plan of Care  Goal: Plan of Care Review  Outcome: Ongoing, Not Progressing  Goal: Patient-Specific Goal (Individualized)  Outcome: Ongoing, Not Progressing  Goal: Absence of Hospital-Acquired Illness or Injury  Outcome: Ongoing, Not Progressing  Goal: Optimal Comfort and Wellbeing  Outcome: Ongoing, Not Progressing  Goal: Readiness for Transition of Care  Outcome: Ongoing, Not Progressing     Problem: Diabetes Comorbidity  Goal: Blood Glucose Level Within Targeted Range  Outcome: Ongoing, Not Progressing     Problem: Infection  Goal: Absence of Infection Signs and Symptoms  Outcome: Ongoing, Not Progressing     Problem: Pain Acute  Goal: Acceptable Pain Control and Functional Ability  Outcome: Ongoing, Not Progressing     Problem: Nausea and Vomiting  Goal: Fluid and Electrolyte Balance  Outcome: Ongoing, Not Progressing     Problem: Fall Injury Risk  Goal: Absence of Fall and Fall-Related Injury  Outcome: Ongoing, Not Progressing

## 2024-02-27 NOTE — ASSESSMENT & PLAN NOTE
Patient was found to have thrombocytopenia, the likely etiology is primary from bone marrow suppression 2/2 chronic illness (ESRD), will monitor the platelets Daily. Will transfuse if platelet count is <50k (if undergoing surgical procedure or have active bleeding). Hold DVT prophylaxis if platelets are <50k. The patient's platelet results have been reviewed and are listed below.  Recent Labs   Lab 02/27/24  0408   PLT 42*

## 2024-02-27 NOTE — PROGRESS NOTES
[facility] Medicine  Progress Note    Patient Name: Tabby Howard  MRN: [id number]  Patient Class: IP- Inpatient   Admission Date: 2/24/2024  Length of Stay: 1 days  Attending Physician: Christiano Nair III*  Primary Care Provider: Melony Kim NP        Subjective:     Principal Problem:Acute glaucoma of left eye        HPI:  Patient is a 34F with HTN, T2DM c/b gastroparesis, sickle cell trait, SVT, and ESRD on HD (TTS), here for severe pain following retinal detachment surgery 2 weeks ago. Patient transferred from [facility] for an emergent Ophthalmology consult. Reportedly, the patient is approximately 2 weeks status post left eye surgery for retinal detachment performed at [facility]. Did complete follow-up with her glaucoma specialist, had progressive eye pain that was initially managed with eye drops, however progressed to an intolerable pain level for which she sought care at the ED. Associated decreased L visual acuity. Underwent ophthalmology eval on 2/24, had acute glaucoma with increased IOP 80. IOP on presentation 20//79 improved to 13 after AC tap. IV Diamox given for IOP, Dorzolamide, brimonidine, and timolol given for IOP. Continued to have pain despite intervention and reduction of IOP.     In ED, /110, no hypoxia (on 2L NC initially, however no desaturation recorded), afebrile, no tachycardia. Received fentanyl for pain control. Underwent AC tap with reduction in IOP. CBC notable for thrombocytopenia (PLT 99), no leukocytosis, stable anemia (Hb 10.7). CMP c/w ESRD, no severe lyte abnormalities. Hyperglycemic to 295. Procal elevated 0.5, troponin normal.     Overview/Hospital Course:  Patient admitted w/ severe eye pain, found to have elevated IOP, now s/p AC tap and drain. Patient started on various medicated eye drops in order to decrease IOP, as AC tap only temporizing measure. Patient continued to complain of eye pain,  increased pain medication frequency, discussed with ophthalmology, and patient should continue on current regimen. Patient requesting adult regular diet so that patient can eat salads, discussed risks and benefits given ESRD, patient understands risks associated w/ possible hyperK. Ordered adult regular diet w/ low k      Interval History: NAEON. Patient NPO overnight for potential procedure per optho. Discussing case with optho for best long term management of patient. Patient reports unchanged pain, but otherwise is sleeping well in between interviews    Review of Systems   Constitutional:  Negative for fever.   HENT:  Negative for sore throat.    Eyes:  Positive for photophobia.   Respiratory:  Negative for cough, shortness of breath and wheezing.    Cardiovascular:  Negative for chest pain, palpitations and leg swelling.   Gastrointestinal:  Negative for abdominal pain, diarrhea, nausea and vomiting.   Genitourinary:  Negative for dysuria and urgency.   Skin:  Negative for rash.   Neurological:  Negative for syncope and light-headedness.   Psychiatric/Behavioral:  Negative for confusion.      Objective:     Vital Signs (Most Recent):  Temp: 99.5 °F (37.5 °C) (02/27/24 0716)  Pulse: 81 (02/27/24 1215)  Resp: 18 (02/27/24 0818)  BP: 130/77 (02/27/24 1215)  SpO2: (!) 93 % (02/27/24 0716) Vital Signs (24h Range):  Temp:  [97.2 °F (36.2 °C)-99.5 °F (37.5 °C)] 99.5 °F (37.5 °C)  Pulse:  [76-93] 81  Resp:  [16-18] 18  SpO2:  [90 %-98 %] 93 %  BP: (119-161)/(64-88) 130/77     Weight: 53.1 kg (117 lb)  Body mass index is 21.4 kg/m².    Intake/Output Summary (Last 24 hours) at 2/27/2024 1233  Last data filed at 2/26/2024 2000  Gross per 24 hour   Intake 120 ml   Output --   Net 120 ml         Physical Exam  Constitutional:       Appearance: Normal appearance.   HENT:      Head: Normocephalic.      Right Ear: External ear normal.      Left Ear: External ear normal.   Eyes:      Comments: Left eye conjunctival injection,  tearing, fixed pupil   Cardiovascular:      Rate and Rhythm: Normal rate and regular rhythm.      Pulses: Normal pulses.   Pulmonary:      Effort: Pulmonary effort is normal.      Breath sounds: Normal breath sounds.   Abdominal:      General: Abdomen is flat.      Palpations: Abdomen is soft.   Skin:     General: Skin is warm.      Capillary Refill: Capillary refill takes less than 2 seconds.   Neurological:      General: No focal deficit present.      Mental Status: She is alert and oriented to person, place, and time.             Significant Labs: All pertinent labs within the past 24 hours have been reviewed.  CBC:   Recent Labs   Lab 02/26/24  0416 02/26/24  1305 02/27/24  0408   WBC 4.22 3.45* 3.62*   HGB 8.7* 8.4* 7.8*   HCT 25.7* 25.2* 23.3*   PLT 49* 49* 42*     CMP:   Recent Labs   Lab 02/26/24  0416 02/27/24  0408    139   K 4.3 4.2    96   CO2 27 28   GLU 43* 142*   BUN 24* 37*   CREATININE 6.6* 8.2*   CALCIUM 8.7 8.5*   PROT 6.1 6.3   ALBUMIN 3.1* 3.3*   BILITOT 0.9 1.2*   ALKPHOS 93 100   AST 16 14   ALT <5* <5*   ANIONGAP 13 15       Significant Imaging: I have reviewed all pertinent imaging results/findings within the past 24 hours.    Assessment/Plan:      * Acute glaucoma of left eye  Evaluated by ophthalmology who performed AC tap following failure of conservative medical management. Demonstrated IOP 80 with improvement to 13 following procedure and IV diamox as well as timolol.   Having severe pain requiring IV dilaudid for adequate control. Patient still with severe pain despite AC tap, but per ophthalmology, this is only a temporizing measure while waiting for eye drops to take effect. Patient NPO 2/27 for potential procedure    PLAN:  -- Per Ophtho: Start drops:               Timolol/Dorzolomide (Cosopt) BID OS               Brimonidine TID OS  - Wait 5-10 min in between drops.   - Start Erythromycin mansoor TID OS after drops for 2 days.   - pain control with Roxicodone and dilaudid,  wean as able      Hypertension  Chronic, uncontrolled. Latest blood pressure and vitals reviewed-     Temp:  [98.5 °F (36.9 °C)-98.9 °F (37.2 °C)]   Pulse:  [78-93]   Resp:  [13-21]   BP: (123-190)/()   SpO2:  [96 %-100 %] .   Home meds for hypertension were reviewed and noted below.   Hypertension Medications               amLODIPine (NORVASC) 5 MG tablet Take 1 tablet (5 mg total) by mouth once daily.    amLODIPine (NORVASC) 5 MG tablet Take 1 tablet every day by oral route for 90 days.    carvediloL (COREG) 25 MG tablet Take 1 tablet (25 mg total) by mouth 2 (two) times daily.    carvediloL (COREG) 25 MG tablet Take 1 tablet twice a day by oral route for 30 days.    hydrALAZINE (APRESOLINE) 100 MG tablet Take 1 tablet (100 mg total) by mouth 2 (two) times daily.    hydrALAZINE (APRESOLINE) 100 MG tablet Take 1 tablet twice a day by oral route for 90 days.    isosorbide mononitrate (IMDUR) 30 MG 24 hr tablet Take 1 tablet (30 mg total) by mouth once daily.    isosorbide mononitrate (IMDUR) 30 MG 24 hr tablet Take 1 tablet every day by oral route for 90 days.            While in the hospital, will manage blood pressure as follows; Continue home antihypertensive regimen    Will utilize p.r.n. blood pressure medication only if patient's blood pressure greater than 220/110 and she develops symptoms such as worsening chest pain or shortness of breath.    Anemia in ESRD (end-stage renal disease)  Patient's anemia is currently controlled. Has not received any PRBCs to date. Etiology likely d/t chronic disease due to Chronic Kidney Disease/ESRD  Current CBC reviewed-   Lab Results   Component Value Date    HGB 7.8 (L) 02/27/2024    HCT 23.3 (L) 02/27/2024     Monitor serial CBC and transfuse if patient becomes hemodynamically unstable, symptomatic or H/H drops below 7/21.  - Patient with worsening anemia during hospital admission, per chart review, patient's baseline fluctuates with occasional drops ~7-8. Patient  MCV normocytic, without signs of overt bleeding, will initiate hemolytic w/up  - f/u hgb, haptoglobin, LDH, inidirect bili, peripheral smear    Type 2 diabetes mellitus with hyperglycemia, with long-term current use of insulin, previous HbA1c 5.8%  Aspart 5u TIDWM  Gabapentin 600 bid for neuropathy     Patient BG wnl without insulin in past 24hrs holding home lantus      Adjustment disorder  Continue SSRI      Thrombocytopenia  Patient was found to have thrombocytopenia, the likely etiology is primary from bone marrow suppression 2/2 chronic illness (ESRD), will monitor the platelets Daily. Will transfuse if platelet count is <50k (if undergoing surgical procedure or have active bleeding). Hold DVT prophylaxis if platelets are <50k. The patient's platelet results have been reviewed and are listed below.  Recent Labs   Lab 02/27/24  0408   PLT 42*           Deep vein thrombosis (DVT) of non-extremity vein  Patient w/ acute drop in hgb, but denied any changes in bowel habits  Holding eliquis given acute drop      ESRD (end stage renal disease)  Creatine stable for now. BMP reviewed- noted Estimated Creatinine Clearance: 12.9 mL/min (A) (based on SCr of 4.8 mg/dL (H)). according to latest data. Based on current GFR, CKD stage is end stage.  Monitor UOP and serial BMP and adjust therapy as needed. Renally dose meds. Avoid nephrotoxic medications and procedures.    HD TTS, CTM CMP   Continue Sevelamer     Seizure disorder  Continue Keppra        VTE Risk Mitigation (From admission, onward)           Ordered     IP VTE HIGH RISK PATIENT  Once         02/25/24 0530     Place sequential compression device  Until discontinued         02/25/24 0530                    Discharge Planning   TATIANA: 2/27/2024     Code Status: Full Code   Is the patient medically ready for discharge?: No    Reason for patient still in hospital (select all that apply): Patient trending condition                     Maxwell Farah MD  Department of  [facility]   [facility]     PAST MEDICAL HISTORY:  No pertinent past medical history

## 2024-02-27 NOTE — PROGRESS NOTES
"Progress Note  Ophthalmology Service    Date: 02/27/2024    History of Present Illness: Tabby Howard is a 34 y.o. female with history PDR/Vit heme who [place] [facility] [place] OS for 1 week. Seen by outside ophthalmologist who did retina surgery back in January for her. This DrKristen Tried to get her scheduled for glaucoma surgery next week. PT reports her pain is unbearable. Ophthalmology is being consulted to evaluate.      Chief complaint: left eye pain     Interval History:   Patient resting comfortably in bed. After being woken, patient began to cry out in pain. States pain is "the same."      Current eye gtts:   Cosopt BID OS  Brimonidine TID OS  Pred forte QID OS  Atropine BID OS    Medications this encounter:    sodium chloride 0.9%   Intravenous Once    amLODIPine  5 mg Oral Daily    atropine 1%  1 drop Left Eye BID    brimonidine 0.15 % OPTH DROP  1 drop Left Eye TID    carvediloL  25 mg Oral BID    cetirizine  10 mg Oral Daily    dorzolamide  1 drop Left Eye BID    FLUoxetine  20 mg Oral Daily    fluticasone propionate  1 spray Each Nostril Daily    gabapentin  600 mg Oral BID    hydrALAZINE  100 mg Oral BID    isosorbide mononitrate  30 mg Oral Daily    levETIRAcetam  500 mg Oral BID    methazoLAMIDE  100 mg Oral TID    polyethylene glycol  17 g Oral Daily    prednisoLONE acetate  1 drop Left Eye QID    sevelamer carbonate  800 mg Oral TID WM    sucralfate  1 g Oral QID (WM & HS)    timolol maleate 0.5%  1 drop Left Eye BID       Allergies: is allergic to penicillins.     ROS: As per HPI    Ocular examination/Dilated fundus examination:  Base Eye Exam       Visual Acuity (Snellen - Linear)         Right Left    Dist sc  NLP    Dist ph sc 20/200    Poor effort with exam OD             Tonometry (Tonopen, 9:23 AM)         Right Left    Pressure 25 61              Tonometry #2 (Tonopen, 9:23 AM)         Right Left    Pressure 30 61              Tonometry #3 (Tonopen, 9:24 AM)         Right Left "    Pressure  60              Pupils         Dark Light Shape React APD    Right 4 4 Round Minimal None    Left                   Neuro/Psych       Oriented x3: Yes    Mood/Affect: Normal                  Slit Lamp and Fundus Exam       External Exam         Right Left    External Normal Normal              Slit Lamp Exam         Right Left    Lids/Lashes Normal Normal    Conjunctiva/Sclera White and quiet injection and chemosis    Cornea Clear MCE difusely    Anterior Chamber Deep and formed 30-40% hyphema, overlying fibrin, improving    Iris Round and minimally reactive view only superior, appears flat    Lens Clear appears phakic, very poor view    Anterior Vitreous Normal no view                    Assessment/Plan:     1. Hyphema OS with elevated IOP likely 2/2 NVG  - VA 20/70 // LP or worse.   - IOP on presentation 20//79 improved to 13 after AC tap  - per patient, had retina surgery in left eye in January, unclear what surgery and with what surgeon this was performed   - 2/25/24: IOP 44, PT resting comfortably in bed.    - on Cosopt and Brimonidine  - + Sickle cell Trait  - 02/26/24: patient in pain again, IOP 49 initially; 36 with applanation  - patient denies being able to see light in left eye  - Discussed with Dr. Faith and Dr. Saez, ok to continue methazolamide at current dose, also add atropine and pred forte as below   - Also discussed with Dr. Priest, who may perform tube shunt surgery in near future depending on patient's clinical course  - ophtho will continue to follow while inpatient  - 02/27/24: Patient states she is in pain. Was resting comfortably prior to be woken. Eyeshield not on eye.   - IOP elevated at ~60 OS, somewhat elevated OD as well (25-30) (pt had not received drops or methazolamide yet this AM)  - hyphema appears to be improving  - patient states it is ok to call her mother to discuss health information. Called mother, Tanya who states she was scheduled for outpatient appt  "today 02/27 in [place], possibly at [facility]  - Also discussed with Dr. Garay (retina) plan to see patient again tomorrow and dilate right eye (unaffected eye)  - Given poor prognosis and visual outcome in left eye, unlikely for surgical intervention at this time, will likely be able to follow as outpatient  - can start Cosopt and Brimonidine in right eye as well (orders placed)  - Will follow while inpatient     Recommendations:   - Continue drops:               Timolol/Dorzolomide (Cosopt) BID OS (start OD as well)              Brimonidine TID OS  (start OD as well)  - Continue Methazolamide 100mg TID   - Continue Atropine BID OS  - Continue Pred Forte QID OS  - head of bed elevation at all times  - clear eyeshield while not placing drops  - Wait 5-10 min in between drops.   - RD/return precautions discussed   - Please call the on call resident with any questions of worsening exam findings.        Patient's Best Contact Number: [phone]         Nura Kerr MD (Brad)   [facility], PGY-2  02/27/2024  9:15 AM  "

## 2024-02-27 NOTE — SUBJECTIVE & OBJECTIVE
Interval History: NAEON. Patient NPO overnight for potential procedure per optho. Discussing case with optho for best long term management of patient. Patient reports unchanged pain, but otherwise is sleeping well in between interviews    Review of Systems   Constitutional:  Negative for fever.   HENT:  Negative for sore throat.    Eyes:  Positive for photophobia.   Respiratory:  Negative for cough, shortness of breath and wheezing.    Cardiovascular:  Negative for chest pain, palpitations and leg swelling.   Gastrointestinal:  Negative for abdominal pain, diarrhea, nausea and vomiting.   Genitourinary:  Negative for dysuria and urgency.   Skin:  Negative for rash.   Neurological:  Negative for syncope and light-headedness.   Psychiatric/Behavioral:  Negative for confusion.      Objective:     Vital Signs (Most Recent):  Temp: 99.5 °F (37.5 °C) (02/27/24 0716)  Pulse: 81 (02/27/24 1215)  Resp: 18 (02/27/24 0818)  BP: 130/77 (02/27/24 1215)  SpO2: (!) 93 % (02/27/24 0716) Vital Signs (24h Range):  Temp:  [97.2 °F (36.2 °C)-99.5 °F (37.5 °C)] 99.5 °F (37.5 °C)  Pulse:  [76-93] 81  Resp:  [16-18] 18  SpO2:  [90 %-98 %] 93 %  BP: (119-161)/(64-88) 130/77     Weight: 53.1 kg (117 lb)  Body mass index is 21.4 kg/m².    Intake/Output Summary (Last 24 hours) at 2/27/2024 1233  Last data filed at 2/26/2024 2000  Gross per 24 hour   Intake 120 ml   Output --   Net 120 ml         Physical Exam  Constitutional:       Appearance: Normal appearance.   HENT:      Head: Normocephalic.      Right Ear: External ear normal.      Left Ear: External ear normal.   Eyes:      Comments: Left eye conjunctival injection, tearing, fixed pupil   Cardiovascular:      Rate and Rhythm: Normal rate and regular rhythm.      Pulses: Normal pulses.   Pulmonary:      Effort: Pulmonary effort is normal.      Breath sounds: Normal breath sounds.   Abdominal:      General: Abdomen is flat.      Palpations: Abdomen is soft.   Skin:     General: Skin is  warm.      Capillary Refill: Capillary refill takes less than 2 seconds.   Neurological:      General: No focal deficit present.      Mental Status: She is alert and oriented to person, place, and time.             Significant Labs: All pertinent labs within the past 24 hours have been reviewed.  CBC:   Recent Labs   Lab 02/26/24  0416 02/26/24  1305 02/27/24  0408   WBC 4.22 3.45* 3.62*   HGB 8.7* 8.4* 7.8*   HCT 25.7* 25.2* 23.3*   PLT 49* 49* 42*     CMP:   Recent Labs   Lab 02/26/24  0416 02/27/24  0408    139   K 4.3 4.2    96   CO2 27 28   GLU 43* 142*   BUN 24* 37*   CREATININE 6.6* 8.2*   CALCIUM 8.7 8.5*   PROT 6.1 6.3   ALBUMIN 3.1* 3.3*   BILITOT 0.9 1.2*   ALKPHOS 93 100   AST 16 14   ALT <5* <5*   ANIONGAP 13 15       Significant Imaging: I have reviewed all pertinent imaging results/findings within the past 24 hours.

## 2024-02-27 NOTE — ASSESSMENT & PLAN NOTE
Patient's anemia is currently controlled. Has not received any PRBCs to date. Etiology likely d/t chronic disease due to Chronic Kidney Disease/ESRD  Current CBC reviewed-   Lab Results   Component Value Date    HGB 7.8 (L) 02/27/2024    HCT 23.3 (L) 02/27/2024     Monitor serial CBC and transfuse if patient becomes hemodynamically unstable, symptomatic or H/H drops below 7/21.  - Patient with worsening anemia during hospital admission, per chart review, patient's baseline fluctuates with occasional drops ~7-8. Patient MCV normocytic, without signs of overt bleeding, will initiate hemolytic w/up  - f/u hgb, haptoglobin, LDH, inidirect bili, peripheral smear

## 2024-02-27 NOTE — PROGRESS NOTES
02/27/24 1245   Post-Hemodialysis Assessment   Rinseback Volume (mL) 250 mL   Blood Volume Processed (Liters) 59.1 L   Dialyzer Clearance Lightly streaked   Duration of Treatment 210 minutes   Additional Fluid Intake (mL) 300 mL   Total UF (mL) 2410 mL   Net Fluid Removal 1910   Patient Response to Treatment tolerated well   Post-Hemodialysis Comments see notes     HD TX complete. Pt AAO, VSS, NAD. Net removal 1910 ml. Report given to primary nurse. Awaiting transport to escort pt back to room via wheelchair.

## 2024-02-27 NOTE — PROGRESS NOTES
Patient arrived in a wheelchair to dialysis unit.   Report received from primary nurse  VS's per dialysis Flowsheet.     Hemodialysis initiated using the following:     Dialysis Access: Right chest wall cath, accessed using aseptic technique     Will Maintain telemetry and blood pressure monitoring throughout treatment.  Refer to dialysis flowsheet and MAR for details.

## 2024-02-28 VITALS
OXYGEN SATURATION: 99 % | RESPIRATION RATE: 18 BRPM | WEIGHT: 116.19 LBS | BODY MASS INDEX: 21.38 KG/M2 | TEMPERATURE: 98 F | HEART RATE: 74 BPM | DIASTOLIC BLOOD PRESSURE: 69 MMHG | HEIGHT: 62 IN | SYSTOLIC BLOOD PRESSURE: 122 MMHG

## 2024-02-28 LAB
ALBUMIN SERPL BCP-MCNC: 3.3 G/DL (ref 3.5–5.2)
ALP SERPL-CCNC: 96 U/L (ref 55–135)
ALT SERPL W/O P-5'-P-CCNC: <5 U/L (ref 10–44)
ANION GAP SERPL CALC-SCNC: 10 MMOL/L (ref 8–16)
AST SERPL-CCNC: 13 U/L (ref 10–40)
BASOPHILS # BLD AUTO: 0.02 K/UL (ref 0–0.2)
BASOPHILS NFR BLD: 0.7 % (ref 0–1.9)
BILIRUB SERPL-MCNC: 1.6 MG/DL (ref 0.1–1)
BUN SERPL-MCNC: 22 MG/DL (ref 6–20)
CALCIUM SERPL-MCNC: 8.9 MG/DL (ref 8.7–10.5)
CHLORIDE SERPL-SCNC: 103 MMOL/L (ref 95–110)
CO2 SERPL-SCNC: 25 MMOL/L (ref 23–29)
CREAT SERPL-MCNC: 5.6 MG/DL (ref 0.5–1.4)
DAT IGG-SP REAG RBC-IMP: NORMAL
DIFFERENTIAL METHOD BLD: ABNORMAL
EOSINOPHIL # BLD AUTO: 0.1 K/UL (ref 0–0.5)
EOSINOPHIL NFR BLD: 4.9 % (ref 0–8)
ERYTHROCYTE [DISTWIDTH] IN BLOOD BY AUTOMATED COUNT: 16.1 % (ref 11.5–14.5)
EST. GFR  (NO RACE VARIABLE): 9.5 ML/MIN/1.73 M^2
GLUCOSE SERPL-MCNC: 161 MG/DL (ref 70–110)
HCT VFR BLD AUTO: 24.1 % (ref 37–48.5)
HGB BLD-MCNC: 7.9 G/DL (ref 12–16)
IMM GRANULOCYTES # BLD AUTO: 0.01 K/UL (ref 0–0.04)
IMM GRANULOCYTES NFR BLD AUTO: 0.4 % (ref 0–0.5)
LYMPHOCYTES # BLD AUTO: 0.6 K/UL (ref 1–4.8)
LYMPHOCYTES NFR BLD: 21.8 % (ref 18–48)
MAGNESIUM SERPL-MCNC: 2.1 MG/DL (ref 1.6–2.6)
MCH RBC QN AUTO: 29.9 PG (ref 27–31)
MCHC RBC AUTO-ENTMCNC: 32.8 G/DL (ref 32–36)
MCV RBC AUTO: 91 FL (ref 82–98)
MONOCYTES # BLD AUTO: 0.3 K/UL (ref 0.3–1)
MONOCYTES NFR BLD: 11.3 % (ref 4–15)
NEUTROPHILS # BLD AUTO: 1.7 K/UL (ref 1.8–7.7)
NEUTROPHILS NFR BLD: 60.9 % (ref 38–73)
NRBC BLD-RTO: 0 /100 WBC
PATH REV BLD -IMP: NORMAL
PHOSPHATE SERPL-MCNC: 3.8 MG/DL (ref 2.7–4.5)
PLATELET # BLD AUTO: 47 K/UL (ref 150–450)
PMV BLD AUTO: 9.9 FL (ref 9.2–12.9)
POCT GLUCOSE: 144 MG/DL (ref 70–110)
POCT GLUCOSE: 166 MG/DL (ref 70–110)
POTASSIUM SERPL-SCNC: 4.5 MMOL/L (ref 3.5–5.1)
PROT SERPL-MCNC: 6.4 G/DL (ref 6–8.4)
RBC # BLD AUTO: 2.64 M/UL (ref 4–5.4)
RETICS/RBC NFR AUTO: 3 % (ref 0.5–2.5)
SODIUM SERPL-SCNC: 138 MMOL/L (ref 136–145)
WBC # BLD AUTO: 2.84 K/UL (ref 3.9–12.7)

## 2024-02-28 PROCEDURE — 80053 COMPREHEN METABOLIC PANEL: CPT

## 2024-02-28 PROCEDURE — 25000003 PHARM REV CODE 250

## 2024-02-28 PROCEDURE — 85045 AUTOMATED RETICULOCYTE COUNT: CPT

## 2024-02-28 PROCEDURE — 99223 1ST HOSP IP/OBS HIGH 75: CPT | Mod: ,,, | Performed by: OPHTHALMOLOGY

## 2024-02-28 PROCEDURE — 86880 COOMBS TEST DIRECT: CPT

## 2024-02-28 PROCEDURE — 83735 ASSAY OF MAGNESIUM: CPT

## 2024-02-28 PROCEDURE — 63600175 PHARM REV CODE 636 W HCPCS

## 2024-02-28 PROCEDURE — 84100 ASSAY OF PHOSPHORUS: CPT

## 2024-02-28 PROCEDURE — 85025 COMPLETE CBC W/AUTO DIFF WBC: CPT

## 2024-02-28 PROCEDURE — 99232 SBSQ HOSP IP/OBS MODERATE 35: CPT | Mod: ,,, | Performed by: NURSE PRACTITIONER

## 2024-02-28 PROCEDURE — 94761 N-INVAS EAR/PLS OXIMETRY MLT: CPT

## 2024-02-28 RX ORDER — TIMOLOL MALEATE 5 MG/ML
1 SOLUTION/ DROPS OPHTHALMIC 2 TIMES DAILY
Qty: 15 ML | Refills: 3 | Status: SHIPPED | OUTPATIENT
Start: 2024-02-28 | End: 2024-03-14 | Stop reason: ALTCHOICE

## 2024-02-28 RX ORDER — LEVETIRACETAM 500 MG/1
500 TABLET ORAL 2 TIMES DAILY
Qty: 60 TABLET | Refills: 11 | Status: SHIPPED | OUTPATIENT
Start: 2024-02-28 | End: 2025-02-27

## 2024-02-28 RX ORDER — OXYCODONE HYDROCHLORIDE 5 MG/1
5 TABLET ORAL EVERY 4 HOURS PRN
Qty: 42 TABLET | Refills: 0 | Status: SHIPPED | OUTPATIENT
Start: 2024-02-28 | End: 2024-02-28

## 2024-02-28 RX ORDER — SEVELAMER CARBONATE 800 MG/1
800 TABLET, FILM COATED ORAL
Qty: 180 TABLET | Refills: 11 | Status: SHIPPED | OUTPATIENT
Start: 2024-02-28

## 2024-02-28 RX ORDER — BUSPIRONE HYDROCHLORIDE 10 MG/1
10 TABLET ORAL 3 TIMES DAILY PRN
Qty: 60 TABLET | Refills: 5 | Status: SHIPPED | OUTPATIENT
Start: 2024-02-28 | End: 2025-02-27

## 2024-02-28 RX ORDER — AMLODIPINE BESYLATE 5 MG/1
5 TABLET ORAL DAILY
Qty: 30 TABLET | Refills: 11 | Status: SHIPPED | OUTPATIENT
Start: 2024-02-29 | End: 2025-02-28

## 2024-02-28 RX ORDER — DORZOLAMIDE HCL 20 MG/ML
1 SOLUTION/ DROPS OPHTHALMIC 2 TIMES DAILY
Qty: 10 ML | Refills: 3 | Status: SHIPPED | OUTPATIENT
Start: 2024-02-28 | End: 2024-03-14 | Stop reason: ALTCHOICE

## 2024-02-28 RX ORDER — OXYCODONE AND ACETAMINOPHEN 10; 325 MG/1; MG/1
1 TABLET ORAL EVERY 4 HOURS PRN
Qty: 42 TABLET | Refills: 0 | Status: SHIPPED | OUTPATIENT
Start: 2024-02-28

## 2024-02-28 RX ORDER — INSULIN DETEMIR 100 [IU]/ML
22 INJECTION, SOLUTION SUBCUTANEOUS NIGHTLY
Qty: 15 ML | Refills: 1 | Status: SHIPPED | OUTPATIENT
Start: 2024-02-28 | End: 2024-02-28

## 2024-02-28 RX ORDER — METHAZOLAMIDE 50 MG/1
100 TABLET ORAL 3 TIMES DAILY
Qty: 180 TABLET | Refills: 11 | Status: ON HOLD | OUTPATIENT
Start: 2024-02-28 | End: 2024-03-08 | Stop reason: HOSPADM

## 2024-02-28 RX ORDER — BRIMONIDINE TARTRATE 1.5 MG/ML
1 SOLUTION/ DROPS OPHTHALMIC 3 TIMES DAILY
Qty: 15 ML | Refills: 3 | Status: SHIPPED | OUTPATIENT
Start: 2024-02-28 | End: 2025-02-27

## 2024-02-28 RX ORDER — ATROPINE SULFATE 10 MG/ML
1 SOLUTION/ DROPS OPHTHALMIC 2 TIMES DAILY
Qty: 15 ML | Refills: 3 | Status: SHIPPED | OUTPATIENT
Start: 2024-02-28 | End: 2024-03-14 | Stop reason: SDUPTHER

## 2024-02-28 RX ORDER — INSULIN DETEMIR 100 [IU]/ML
22 INJECTION, SOLUTION SUBCUTANEOUS NIGHTLY
Qty: 15 ML | Refills: 1 | Status: SHIPPED | OUTPATIENT
Start: 2024-02-28

## 2024-02-28 RX ORDER — SODIUM CHLORIDE 9 MG/ML
INJECTION, SOLUTION INTRAVENOUS ONCE
Status: DISCONTINUED | OUTPATIENT
Start: 2024-02-29 | End: 2024-02-28 | Stop reason: HOSPADM

## 2024-02-28 RX ORDER — OXYCODONE HYDROCHLORIDE 5 MG/1
10 TABLET ORAL EVERY 4 HOURS PRN
Qty: 84 TABLET | Refills: 0 | Status: SHIPPED | OUTPATIENT
Start: 2024-02-28 | End: 2024-02-28 | Stop reason: HOSPADM

## 2024-02-28 RX ORDER — PREDNISOLONE ACETATE 10 MG/ML
1 SUSPENSION/ DROPS OPHTHALMIC 4 TIMES DAILY
Qty: 15 ML | Refills: 3 | Status: SHIPPED | OUTPATIENT
Start: 2024-02-28 | End: 2024-03-14 | Stop reason: SDUPTHER

## 2024-02-28 RX ADMIN — HYDROMORPHONE HYDROCHLORIDE 1 MG: 1 INJECTION, SOLUTION INTRAMUSCULAR; INTRAVENOUS; SUBCUTANEOUS at 07:02

## 2024-02-28 RX ADMIN — OXYCODONE 5 MG: 5 TABLET ORAL at 02:02

## 2024-02-28 RX ADMIN — HYDRALAZINE HYDROCHLORIDE 100 MG: 50 TABLET ORAL at 08:02

## 2024-02-28 RX ADMIN — ISOSORBIDE MONONITRATE 30 MG: 30 TABLET, EXTENDED RELEASE ORAL at 08:02

## 2024-02-28 RX ADMIN — PREDNISOLONE ACETATE 1 DROP: 10 SUSPENSION/ DROPS OPHTHALMIC at 08:02

## 2024-02-28 RX ADMIN — DORZOLAMIDE HCL 1 DROP: 20 SOLUTION/ DROPS OPHTHALMIC at 08:02

## 2024-02-28 RX ADMIN — BRIMONIDINE TARTRATE 1 DROP: 1.5 SOLUTION OPHTHALMIC at 02:02

## 2024-02-28 RX ADMIN — LEVETIRACETAM 500 MG: 500 TABLET, FILM COATED ORAL at 08:02

## 2024-02-28 RX ADMIN — METHAZOLAMIDE 100 MG: 50 TABLET ORAL at 08:02

## 2024-02-28 RX ADMIN — METHAZOLAMIDE 100 MG: 50 TABLET ORAL at 02:02

## 2024-02-28 RX ADMIN — CARVEDILOL 25 MG: 12.5 TABLET, FILM COATED ORAL at 08:02

## 2024-02-28 RX ADMIN — FLUOXETINE HYDROCHLORIDE 20 MG: 20 CAPSULE ORAL at 08:02

## 2024-02-28 RX ADMIN — ATROPINE SULFATE 1 DROP: 10 SOLUTION/ DROPS OPHTHALMIC at 08:02

## 2024-02-28 RX ADMIN — AMLODIPINE BESYLATE 5 MG: 5 TABLET ORAL at 08:02

## 2024-02-28 RX ADMIN — HYDROMORPHONE HYDROCHLORIDE 1 MG: 1 INJECTION, SOLUTION INTRAMUSCULAR; INTRAVENOUS; SUBCUTANEOUS at 04:02

## 2024-02-28 RX ADMIN — GABAPENTIN 600 MG: 300 CAPSULE ORAL at 08:02

## 2024-02-28 RX ADMIN — CETIRIZINE HYDROCHLORIDE 10 MG: 10 TABLET, FILM COATED ORAL at 08:02

## 2024-02-28 RX ADMIN — TIMOLOL MALEATE 1 DROP: 5 SOLUTION/ DROPS OPHTHALMIC at 08:02

## 2024-02-28 RX ADMIN — PREDNISOLONE ACETATE 1 DROP: 10 SUSPENSION/ DROPS OPHTHALMIC at 02:02

## 2024-02-28 RX ADMIN — BRIMONIDINE TARTRATE 1 DROP: 1.5 SOLUTION OPHTHALMIC at 08:02

## 2024-02-28 NOTE — PLAN OF CARE
Pt discharged to home via wheelchair with belongings. IV removed. Discharge packet discussed with pt. All questions answered. New meds delivered to bedside.

## 2024-02-28 NOTE — PLAN OF CARE
Sw attempted to meet with Pt as Sw still has not completed dc planning assessment, Pt was in the room with a physician, will re attempt.

## 2024-02-28 NOTE — PLAN OF CARE
[facility]  Initial Discharge Assessment       Primary Care Provider: Melony Kim NP    Admission Diagnosis: Postoperative pain [G89.18]  Hyphema of left eye [H21.02]  Hypertensive urgency [I16.0]  Chest pain [R07.9]  ESRD on hemodialysis [N18.6, Z99.2]  Left eye pain [H57.12]  Increased pressure in the eye, left [H40.052]  Acute glaucoma of left eye [H40.9]    Admission Date: 2/24/2024  Expected Discharge Date: 2/28/2024    Transition of Care Barriers: None    Payor: MEDICAID / Plan: HEALTH[place] BLUE [place] / Product Type: Managed Medicaid /     Extended Emergency Contact Information  Primary Emergency Contact: Tanya Howard  Address: [address] [place], [place] [id number] [facility]  Home Phone: [phone]  Mobile Phone: [phone]  Relation: Step parent  Preferred language: English   needed? No  Secondary Emergency Contact: Garcia Howard  Address: [address] [place] [address]  Mobile Phone: [phone]  Relation: Father  Preferred language: English   needed? No    Discharge Plan A: Home with family  Discharge Plan B: Home      [facility]  [address]  [facility] [address]  Phone: [phone] Fax: [phone]      Initial Assessment (most recent)       Adult Discharge Assessment - 02/28/24 1214          Discharge Assessment    Assessment Type Discharge Planning Assessment     Confirmed/corrected address, phone number and insurance Yes     Confirmed Demographics Correct on Facesheet     Source of Information patient     Does patient/caregiver understand observation status Yes     Communicated TATIANA with patient/caregiver Yes     Reason For Admission Post op pain     People in Home child(tammy), dependent;grandparent(s)     Facility Arrived From: Home     Do you expect to return to your current living situation? Yes     Do you have help at home or someone to help you manage your care at home? Yes     Who are  your caregiver(s) and their phone number(s)? Tanya Howard     Prior to hospitilization cognitive status: Alert/Oriented;No Deficits     Current cognitive status: Alert/Oriented;No Deficits     Walking or Climbing Stairs Difficulty no     Dressing/Bathing Difficulty no     Home Accessibility wheelchair accessible     Home Layout Able to live on 1st floor     Equipment Currently Used at Home none     Readmission within 30 days? Yes     Patient currently being followed by outpatient case management? No     Do you currently have service(s) that help you manage your care at home? No     Do you take prescription medications? Yes     Do you have prescription coverage? Yes     Do you have any problems affording any of your prescribed medications? No     Is the patient taking medications as prescribed? yes     Who is going to help you get home at discharge? Family     How do you get to doctors appointments? family or friend will provide     Are you on dialysis? Yes     Dialysis Name and Scheduled days [facility]     Do you take coumadin? No     Discharge Plan A Home with family     Discharge Plan B Home     DME Needed Upon Discharge  none     Discharge Plan discussed with: Patient     Transition of Care Barriers None        Physical Activity    On average, how many days per week do you engage in moderate to strenuous exercise (like a brisk walk)? 1 day     On average, how many minutes do you engage in exercise at this level? 10 min        Financial Resource Strain    How hard is it for you to pay for the very basics like food, housing, medical care, and heating? Not very hard        Housing Stability    In the last 12 months, was there a time when you were not able to pay the mortgage or rent on time? No     In the last 12 months, how many places have you lived? 2     In the last 12 months, was there a time when you did not have a steady place to sleep or slept in a shelter (including now)? No        Transportation Needs     In the past 12 months, has lack of transportation kept you from medical appointments or from getting medications? No     In the past 12 months, has lack of transportation kept you from meetings, work, or from getting things needed for daily living? No        Food Insecurity    Within the past 12 months, you worried that your food would run out before you got the money to buy more. Never true     Within the past 12 months, the food you bought just didn't last and you didn't have money to get more. Never true        Stress    Do you feel stress - tense, restless, nervous, or anxious, or unable to sleep at night because your mind is troubled all the time - these days? Only a little        Social Connections    In a typical week, how many times do you talk on the phone with family, friends, or neighbors? More than three times a week     How often do you get together with friends or relatives? More than three times a week     How often do you attend Islam or Confucianist services? 1 to 4 times per year     Do you belong to any clubs or organizations such as Islam groups, unions, fraternal or athletic groups, or school groups? No     How often do you attend meetings of the clubs or organizations you belong to? Never     Are you , , , , never , or living with a partner?         Alcohol Use    Q1: How often do you have a drink containing alcohol? Never     Q2: How many drinks containing alcohol do you have on a typical day when you are drinking? Patient does not drink     Q3: How often do you have six or more drinks on one occasion? Never

## 2024-02-28 NOTE — PROGRESS NOTES
[facility]  Nephrology  Progress Note    Patient Name: Tabby Howard  MRN: [id number]  Admission Date: 2/24/2024  Hospital Length of Stay: 2 days  Attending Provider: Christiano Nair III*   Primary Care Physician: Melony Kim NP  Principal Problem:Acute glaucoma of left eye    Subjective:     Interval History: HD yesterday, tolerated well. UF 2.4L.     Review of patient's allergies indicates:   Allergen Reactions    Penicillins Hives     Current Facility-Administered Medications   Medication Frequency    [START ON 2/29/2024] 0.9%  NaCl infusion Once    acetaminophen tablet 1,000 mg Q8H PRN    aluminum-magnesium hydroxide-simethicone 200-200-20 mg/5 mL suspension 30 mL QID PRN    amLODIPine tablet 5 mg Daily    atropine 1% ophthalmic solution 1 drop BID    brimonidine 0.15 % OPTH DROP ophthalmic solution 1 drop TID    busPIRone tablet 10 mg TID PRN    carvediloL tablet 25 mg BID    cetirizine tablet 10 mg Daily    dextrose 10% bolus 125 mL 125 mL PRN    dextrose 10% bolus 250 mL 250 mL PRN    dorzolamide 2 % ophthalmic solution 1 drop BID    FLUoxetine capsule 20 mg Daily    fluticasone propionate 50 mcg/actuation nasal spray 50 mcg Daily    gabapentin capsule 600 mg BID    glucagon (human recombinant) injection 1 mg PRN    glucose chewable tablet 16 g PRN    glucose chewable tablet 24 g PRN    hydrALAZINE tablet 100 mg BID    HYDROmorphone injection 1 mg Q4H PRN    insulin aspart U-100 pen 0-5 Units QID (AC + HS) PRN    isosorbide mononitrate 24 hr tablet 30 mg Daily    levETIRAcetam tablet 500 mg BID    melatonin tablet 6 mg Nightly PRN    methazoLAMIDE tablet 100 mg TID    naloxone 0.4 mg/mL injection 0.02 mg PRN    ondansetron disintegrating tablet 8 mg Q8H PRN    oxyCODONE immediate release tablet 5 mg Q4H PRN    polyethylene glycol packet 17 g Daily    prednisoLONE acetate 1 % ophthalmic suspension 1 drop QID    prochlorperazine injection Soln 5 mg Q6H PRN    sevelamer carbonate tablet 800  mg TID WM    simethicone chewable tablet 80 mg QID PRN    sodium chloride 0.9% flush 10 mL Q12H PRN    sucralfate 100 mg/mL suspension 1 g QID (WM & HS)    timolol maleate 0.5% ophthalmic solution 1 drop BID       Objective:     Vital Signs (Most Recent):  Temp: 98.5 °F (36.9 °C) (02/28/24 0736)  Pulse: 85 (02/28/24 0736)  Resp: 20 (02/28/24 0736)  BP: (!) 152/82 (02/28/24 0847)  SpO2: 98 % (02/28/24 0736) Vital Signs (24h Range):  Temp:  [97.7 °F (36.5 °C)-99.2 °F (37.3 °C)] 98.5 °F (36.9 °C)  Pulse:  [80-85] 85  Resp:  [18-20] 20  SpO2:  [98 %-99 %] 98 %  BP: (121-157)/(64-85) 152/82     Weight: 52.7 kg (116 lb 2.9 oz) (02/28/24 0431)  Body mass index is 21.25 kg/m².  Body surface area is 1.52 meters squared.    I/O last 3 completed shifts:  In: 620 [P.O.:320; Other:300]  Out: 2410 [Other:2410]     Physical Exam  Vitals and nursing note reviewed.   Constitutional:       Appearance: Normal appearance.   HENT:      Head: Normocephalic and atraumatic.      Nose: Nose normal.      Mouth/Throat:      Mouth: Mucous membranes are moist.   Cardiovascular:      Rate and Rhythm: Normal rate and regular rhythm.      Pulses: Normal pulses.      Heart sounds: Normal heart sounds.   Pulmonary:      Effort: Pulmonary effort is normal.      Breath sounds: Normal breath sounds.   Abdominal:      General: Abdomen is flat. Bowel sounds are normal.      Palpations: Abdomen is soft.   Musculoskeletal:         General: Normal range of motion.   Skin:     General: Skin is warm and dry.   Neurological:      General: No focal deficit present.      Mental Status: She is alert and oriented to person, place, and time. Mental status is at baseline.   Psychiatric:         Mood and Affect: Mood normal.          Significant Labs:  CBC:   Recent Labs   Lab 02/28/24  0742   WBC 2.84*   RBC 2.64*   HGB 7.9*   HCT 24.1*   PLT 47*   MCV 91   MCH 29.9   MCHC 32.8     CMP:   Recent Labs   Lab 02/28/24  0742   *   CALCIUM 8.9   ALBUMIN 3.3*    PROT 6.4      K 4.5   CO2 25      BUN 22*   CREATININE 5.6*   ALKPHOS 96   ALT <5*   AST 13   BILITOT 1.6*     All labs within the past 24 hours have been reviewed.         Assessment/Plan:     Ophtho  * Acute glaucoma of left eye  - defer to primary team     Renal/  ESRD (end stage renal disease)  34 y.o. Black or  Female ESRD-HD T-T-S  presents to ED on 2/24/2024 with severe L eye pain. Transferred for emergent evaluation by Ophthalmology.  Nephrology consulted for inpatient ESRD-HD management    Outpatient HD Information:  -Dialysis modality: Hemodialysis  -Outpatient HD unit: [facility]  -Nephrologist: Emmanuel SPRINGER  -HD TX days: Tuesday/Thursday/Saturday, duration of treatment: 4 hrs   -Last HD TX prior to hospital admission: 2/24  -Dialysis access: dialysis catheter , + new L AVF (maturing)   -Residual urine: Minium   -EDW: 51 kg     Assessment:   - Continue TTS schedule while IP   - Labs reviewed and dialysate to be adjusted to current labs.   - Continue to monitor intake and output  - Please avoid gadolinium, fleets, phos-based laxatives, NSAIDs  - Dialysis thrice weekly unless more urgent indications arise. Will evaluate RRT requirements Daily.    Anemia of ESRD   Recent Labs   Lab 02/27/24  0408 02/27/24  1704 02/28/24  0742   WBC 3.62* 2.73* 2.84*   HGB 7.8* 7.8* 7.9*   HCT 23.3* 23.2* 24.1*   PLT 42* 48* 47*       Lab Results   Component Value Date    FESATURATED 97 (H) 12/16/2023    FERRITIN 6,013.5 (H) 12/16/2023       - Goal in ESRD is Hgb of 10-11. Hgb 8.7.  - Will review chronic care plan from outpatient dialysis center for SHELLEY dosing.     Mineral Bone Disease in ESRD   Lab Results   Component Value Date    .8 (H) 07/08/2022    CALCIUM 8.9 02/28/2024    ALBUMIN 3.3 (L) 02/28/2024    PHOS 3.8 02/28/2024       - F/U PO4, Mg, Calcium. And albumin levels daily.   - Renal diet with protein intake goal 1.5 g/kg/d with 1 L fluid restriction  - Continue sevelamer 800  TIDWM        Thank you for your consult. I will follow-up with patient. Please contact us if you have any additional questions.    Stephanie Novak DNP  Nephrology  [facility]

## 2024-02-28 NOTE — ASSESSMENT & PLAN NOTE
34 y.o. Black or  Female ESRD-HD T-T-S  presents to ED on 2/24/2024 with severe L eye pain. Transferred for emergent evaluation by Ophthalmology.  Nephrology consulted for inpatient ESRD-HD management    Outpatient HD Information:  -Dialysis modality: Hemodialysis  -Outpatient HD unit: [facility]  -Nephrologist: Emmanuel SPRINGER  -HD TX days: Tuesday/Thursday/Saturday, duration of treatment: 4 hrs   -Last HD TX prior to hospital admission: 2/24  -Dialysis access: dialysis catheter , + new L AVF (maturing)   -Residual urine: Minium   -EDW: 51 kg     Assessment:   - Continue TTS schedule while IP   - Labs reviewed and dialysate to be adjusted to current labs.   - Continue to monitor intake and output  - Please avoid gadolinium, fleets, phos-based laxatives, NSAIDs  - Dialysis thrice weekly unless more urgent indications arise. Will evaluate RRT requirements Daily.    Anemia of ESRD   Recent Labs   Lab 02/27/24  0408 02/27/24  1704 02/28/24  0742   WBC 3.62* 2.73* 2.84*   HGB 7.8* 7.8* 7.9*   HCT 23.3* 23.2* 24.1*   PLT 42* 48* 47*       Lab Results   Component Value Date    FESATURATED 97 (H) 12/16/2023    FERRITIN 6,013.5 (H) 12/16/2023       - Goal in ESRD is Hgb of 10-11. Hgb 8.7.  - Will review chronic care plan from outpatient dialysis center for SHELLEY dosing.     Mineral Bone Disease in ESRD   Lab Results   Component Value Date    .8 (H) 07/08/2022    CALCIUM 8.9 02/28/2024    ALBUMIN 3.3 (L) 02/28/2024    PHOS 3.8 02/28/2024       - F/U PO4, Mg, Calcium. And albumin levels daily.   - Renal diet with protein intake goal 1.5 g/kg/d with 1 L fluid restriction  - Continue sevelamer 800 TIDWM

## 2024-02-28 NOTE — PLAN OF CARE
Problem: Adult Inpatient Plan of Care  Goal: Plan of Care Review  Outcome: Ongoing, Progressing  Goal: Patient-Specific Goal (Individualized)  Outcome: Ongoing, Progressing  Goal: Optimal Comfort and Wellbeing  Outcome: Ongoing, Progressing  Goal: Readiness for Transition of Care  Outcome: Ongoing, Progressing     Problem: Diabetes Comorbidity  Goal: Blood Glucose Level Within Targeted Range  Outcome: Ongoing, Progressing     Problem: Infection  Goal: Absence of Infection Signs and Symptoms  Outcome: Ongoing, Progressing     Problem: Pain Acute  Goal: Acceptable Pain Control and Functional Ability  Outcome: Ongoing, Progressing     Problem: Nausea and Vomiting  Goal: Fluid and Electrolyte Balance  Outcome: Ongoing, Progressing     Problem: Device-Related Complication Risk (Hemodialysis)  Goal: Safe, Effective Therapy Delivery  Outcome: Ongoing, Progressing     Problem: Hemodynamic Instability (Hemodialysis)  Goal: Effective Tissue Perfusion  Outcome: Ongoing, Progressing     Problem: Infection (Hemodialysis)  Goal: Absence of Infection Signs and Symptoms  Outcome: Ongoing, Progressing

## 2024-02-28 NOTE — DISCHARGE INSTRUCTIONS
For insulin levemir: restart medication if glucose >200 consistently resume home insulin regimen. Or if glucose is persistently less than 100, decrease by 5 units until following up with PCP

## 2024-02-28 NOTE — HPI
Patient is a 33 y/o female with T2DM, gastroparesis, ESRD HD TTS, HTN, sickle cell trait that presented to [facility] on 2/24/24 for left eye pain, being treated for neovascular glaucoma.     Hematology consulted for pancytopenia with + hemolytic markers.     WBC 2.84, prior to admission normal   Hgb 7.9 g/dL, baseline 7-8 since early 2023  Plt 47K, mild-moderate thrombocytopenia since 2022  Iron panel w/ adequate iron   Normal B12 and folate   Hgb electrophoresis shows sickle cell trait   Haptoglobin < 10, since 2022  Negative LADONNA  , elevated since 2022  Peripheral smear - RBCs with anisocytosis and poikilocytosis. Plt are decreased without platelet clumping. Leukopenia with suggestion of early hypersegmentation.

## 2024-02-28 NOTE — SUBJECTIVE & OBJECTIVE
Interval History: HD yesterday, tolerated well. UF 2.4L.     Review of patient's allergies indicates:   Allergen Reactions    Penicillins Hives     Current Facility-Administered Medications   Medication Frequency    [START ON 2/29/2024] 0.9%  NaCl infusion Once    acetaminophen tablet 1,000 mg Q8H PRN    aluminum-magnesium hydroxide-simethicone 200-200-20 mg/5 mL suspension 30 mL QID PRN    amLODIPine tablet 5 mg Daily    atropine 1% ophthalmic solution 1 drop BID    brimonidine 0.15 % OPTH DROP ophthalmic solution 1 drop TID    busPIRone tablet 10 mg TID PRN    carvediloL tablet 25 mg BID    cetirizine tablet 10 mg Daily    dextrose 10% bolus 125 mL 125 mL PRN    dextrose 10% bolus 250 mL 250 mL PRN    dorzolamide 2 % ophthalmic solution 1 drop BID    FLUoxetine capsule 20 mg Daily    fluticasone propionate 50 mcg/actuation nasal spray 50 mcg Daily    gabapentin capsule 600 mg BID    glucagon (human recombinant) injection 1 mg PRN    glucose chewable tablet 16 g PRN    glucose chewable tablet 24 g PRN    hydrALAZINE tablet 100 mg BID    HYDROmorphone injection 1 mg Q4H PRN    insulin aspart U-100 pen 0-5 Units QID (AC + HS) PRN    isosorbide mononitrate 24 hr tablet 30 mg Daily    levETIRAcetam tablet 500 mg BID    melatonin tablet 6 mg Nightly PRN    methazoLAMIDE tablet 100 mg TID    naloxone 0.4 mg/mL injection 0.02 mg PRN    ondansetron disintegrating tablet 8 mg Q8H PRN    oxyCODONE immediate release tablet 5 mg Q4H PRN    polyethylene glycol packet 17 g Daily    prednisoLONE acetate 1 % ophthalmic suspension 1 drop QID    prochlorperazine injection Soln 5 mg Q6H PRN    sevelamer carbonate tablet 800 mg TID WM    simethicone chewable tablet 80 mg QID PRN    sodium chloride 0.9% flush 10 mL Q12H PRN    sucralfate 100 mg/mL suspension 1 g QID (WM & HS)    timolol maleate 0.5% ophthalmic solution 1 drop BID       Objective:     Vital Signs (Most Recent):  Temp: 98.5 °F (36.9 °C) (02/28/24 0736)  Pulse: 85  (02/28/24 0736)  Resp: 20 (02/28/24 0736)  BP: (!) 152/82 (02/28/24 0847)  SpO2: 98 % (02/28/24 0736) Vital Signs (24h Range):  Temp:  [97.7 °F (36.5 °C)-99.2 °F (37.3 °C)] 98.5 °F (36.9 °C)  Pulse:  [80-85] 85  Resp:  [18-20] 20  SpO2:  [98 %-99 %] 98 %  BP: (121-157)/(64-85) 152/82     Weight: 52.7 kg (116 lb 2.9 oz) (02/28/24 0431)  Body mass index is 21.25 kg/m².  Body surface area is 1.52 meters squared.    I/O last 3 completed shifts:  In: 620 [P.O.:320; Other:300]  Out: 2410 [Other:2410]     Physical Exam  Vitals and nursing note reviewed.   Constitutional:       Appearance: Normal appearance.   HENT:      Head: Normocephalic and atraumatic.      Nose: Nose normal.      Mouth/Throat:      Mouth: Mucous membranes are moist.   Cardiovascular:      Rate and Rhythm: Normal rate and regular rhythm.      Pulses: Normal pulses.      Heart sounds: Normal heart sounds.   Pulmonary:      Effort: Pulmonary effort is normal.      Breath sounds: Normal breath sounds.   Abdominal:      General: Abdomen is flat. Bowel sounds are normal.      Palpations: Abdomen is soft.   Musculoskeletal:         General: Normal range of motion.   Skin:     General: Skin is warm and dry.   Neurological:      General: No focal deficit present.      Mental Status: She is alert and oriented to person, place, and time. Mental status is at baseline.   Psychiatric:         Mood and Affect: Mood normal.          Significant Labs:  CBC:   Recent Labs   Lab 02/28/24  0742   WBC 2.84*   RBC 2.64*   HGB 7.9*   HCT 24.1*   PLT 47*   MCV 91   MCH 29.9   MCHC 32.8     CMP:   Recent Labs   Lab 02/28/24  0742   *   CALCIUM 8.9   ALBUMIN 3.3*   PROT 6.4      K 4.5   CO2 25      BUN 22*   CREATININE 5.6*   ALKPHOS 96   ALT <5*   AST 13   BILITOT 1.6*     All labs within the past 24 hours have been reviewed.

## 2024-02-28 NOTE — SUBJECTIVE & OBJECTIVE
Medications:  Continuous Infusions:  Scheduled Meds:   [START ON 2024] sodium chloride 0.9%   Intravenous Once    amLODIPine  5 mg Oral Daily    atropine 1%  1 drop Left Eye BID    brimonidine 0.15 % OPTH DROP  1 drop Both Eyes TID    carvediloL  25 mg Oral BID    cetirizine  10 mg Oral Daily    dorzolamide  1 drop Both Eyes BID    FLUoxetine  20 mg Oral Daily    fluticasone propionate  1 spray Each Nostril Daily    gabapentin  600 mg Oral BID    hydrALAZINE  100 mg Oral BID    isosorbide mononitrate  30 mg Oral Daily    levETIRAcetam  500 mg Oral BID    methazoLAMIDE  100 mg Oral TID    polyethylene glycol  17 g Oral Daily    prednisoLONE acetate  1 drop Left Eye QID    sevelamer carbonate  800 mg Oral TID WM    sucralfate  1 g Oral QID (WM & HS)    timolol maleate 0.5%  1 drop Both Eyes BID     PRN Meds:acetaminophen, aluminum-magnesium hydroxide-simethicone, busPIRone, dextrose 10%, dextrose 10%, glucagon (human recombinant), glucose, glucose, HYDROmorphone, insulin aspart U-100, melatonin, naloxone, ondansetron, oxyCODONE, prochlorperazine, simethicone, sodium chloride 0.9%     Review of patient's allergies indicates:   Allergen Reactions    Penicillins Hives        Past Medical History:   Diagnosis Date    ESRD on hemodialysis 2022    Gastritis 2022    EGD was 21    Gastroparesis 2022    has not had Emptying study    Heart failure with preserved ejection fraction 2022    EF 55% on 3/22    History of supraventricular tachycardia     Hyperkalemia 2022    Hypertensive emergency 2022    Sickle cell trait 2022    Type 2 diabetes mellitus      Past Surgical History:   Procedure Laterality Date     SECTION      x 3    COLONOSCOPY      COLONOSCOPY N/A 2022    Procedure: COLONOSCOPY;  Surgeon: Jagdeep Cedeno MD;  Location: [facility];  Service: Endoscopy;  Laterality: N/A;    ENDOSCOPIC ULTRASOUND OF UPPER GASTROINTESTINAL TRACT N/A 2023    Procedure:  ULTRASOUND, UPPER GI TRACT, ENDOSCOPIC;  Surgeon: Micky Paredes III, MD;  Location: [facility] ENDO;  Service: Endoscopy;  Laterality: N/A;    ESOPHAGOGASTRODUODENOSCOPY N/A 10/18/2019    Procedure: ESOPHAGOGASTRODUODENOSCOPY (EGD);  Surgeon: Gianluca Mendez MD;  Location: [facility] ENDO;  Service: Endoscopy;  Laterality: N/A;    ESOPHAGOGASTRODUODENOSCOPY N/A 08/24/2022    Procedure: EGD (ESOPHAGOGASTRODUODENOSCOPY);  Surgeon: Micky Paredes III, MD;  Location: [facility] ENDO;  Service: Endoscopy;  Laterality: N/A;    ESOPHAGOGASTRODUODENOSCOPY N/A 12/5/2022    Procedure: EGD (ESOPHAGOGASTRODUODENOSCOPY);  Surgeon: Marcelo Zhong MD;  Location: [facility] ENDO;  Service: Endoscopy;  Laterality: N/A;    INSERTION, CATHETER, TUNNELED N/A 6/17/2023    Procedure: Insertion,catheter,tunneled;  Surgeon: Carlos Thurman Jr., MD;  Location: [facility] OR;  Service: General;  Laterality: N/A;    LAPAROSCOPIC CHOLECYSTECTOMY N/A 07/30/2022    Procedure: CHOLECYSTECTOMY, LAPAROSCOPIC;  Surgeon: Grey Perez MD;  Location: [facility] OR;  Service: General;  Laterality: N/A;    PLACEMENT OF DUAL-LUMEN VASCULAR CATHETER Left 07/12/2022    Procedure: INSERTION-CATHETER-JOSEPH;  Surgeon: Dionte Gan MD;  Location: [facility] OR;  Service: General;  Laterality: Left;    PLACEMENT OF DUAL-LUMEN VASCULAR CATHETER Right 07/26/2022    Procedure: INSERTION-CATHETER-Hemosplit;  Surgeon: Dionte Gan MD;  Location: [facility] OR;  Service: General;  Laterality: Right;     Family History       Problem Relation (Age of Onset)    Diabetes Mother, Father          Tobacco Use    Smoking status: Never    Smokeless tobacco: Never   Substance and Sexual Activity    Alcohol use: Not Currently    Drug use: No    Sexual activity: Not Currently     Partners: Male     Birth control/protection: I.U.D.       Review of Systems   Constitutional:  Negative for activity change, appetite change, fatigue and fever.   HENT:  Negative for congestion and sore throat.    Eyes:   Positive for pain, discharge and redness.   Respiratory:  Negative for cough and shortness of breath.    Cardiovascular:  Negative for chest pain.   Gastrointestinal:  Negative for abdominal distention, abdominal pain, blood in stool, constipation and diarrhea.   Genitourinary:  Negative for difficulty urinating and hematuria.   Musculoskeletal:  Negative for arthralgias and back pain.   Neurological:  Negative for light-headedness and headaches.     Objective:     Vital Signs (Most Recent):  Temp: 98.5 °F (36.9 °C) (02/28/24 0736)  Pulse: 85 (02/28/24 0736)  Resp: 20 (02/28/24 0736)  BP: (!) 152/82 (02/28/24 0847)  SpO2: 98 % (02/28/24 0736) Vital Signs (24h Range):  Temp:  [97.7 °F (36.5 °C)-99.2 °F (37.3 °C)] 98.5 °F (36.9 °C)  Pulse:  [80-85] 85  Resp:  [18-20] 20  SpO2:  [98 %-99 %] 98 %  BP: (121-157)/(64-85) 152/82     Weight: 52.7 kg (116 lb 2.9 oz)  Body mass index is 21.25 kg/m².  Body surface area is 1.52 meters squared.      Intake/Output Summary (Last 24 hours) at 2/28/2024 1008  Last data filed at 2/27/2024 2230  Gross per 24 hour   Intake 500 ml   Output 2410 ml   Net -1910 ml        Physical Exam  Constitutional:       Appearance: Normal appearance.   HENT:      Head: Normocephalic and atraumatic.      Nose: Nose normal.      Mouth/Throat:      Mouth: Mucous membranes are moist.   Cardiovascular:      Rate and Rhythm: Normal rate and regular rhythm.      Pulses: Normal pulses.      Heart sounds: Normal heart sounds.   Pulmonary:      Effort: Pulmonary effort is normal.      Breath sounds: Normal breath sounds.   Abdominal:      General: Abdomen is flat. Bowel sounds are normal.      Palpations: Abdomen is soft.   Musculoskeletal:         General: Normal range of motion.   Skin:     General: Skin is warm and dry.   Neurological:      General: No focal deficit present.      Mental Status: She is alert and oriented to person, place, and time. Mental status is at baseline.   Psychiatric:         Mood  and Affect: Mood normal.          Significant Labs:   All pertinent labs from the last 24 hours have been reviewed.    Diagnostic Results:  I have reviewed all pertinent imaging results/findings within the past 24 hours.

## 2024-02-28 NOTE — PLAN OF CARE
[facility] - Med Surg  Discharge Final Note    Primary Care Provider: Melony Kim NP    Expected Discharge Date: 2/28/2024    Final Discharge Note (most recent)       Final Note - 02/28/24 1419          Final Note    Assessment Type Final Discharge Note     Anticipated Discharge Disposition Home or Self Care     Hospital Resources/Appts/Education Provided Appointments scheduled and added to AVS        Post-Acute Status    Post-Acute Authorization Other     Other Status No Post-Acute Service Needs     Discharge Delays None known at this time                     Important Message from Medicare             Contact Info       Melony Kim NP   Specialty: Nurse Practitioner   Relationship: PCP - General    [place] [id number]   Phone: [phone]       Next Steps: Follow up

## 2024-02-28 NOTE — PROGRESS NOTES
Progress Note  Ophthalmology Service    Date: 02/28/2024    History of Present Illness: Tabby Howard is a 34 y.o. female with history PDR/Vit heme who presented to [facility] for painful vision loss OS for 1 week. Seen by outside ophthalmologist who did retina surgery back in January for her. This DrKristen Tried to get her scheduled for glaucoma surgery next week. PT reports her pain is unbearable. Ophthalmology is being consulted to evaluate.      Chief complaint: left eye pain     Interval History:   Patient resting comfortably in bed. States her pain is improved.      Current eye gtts:   Cosopt BID OS  Brimonidine TID OS  Pred forte QID OS  Atropine BID OS    Medications this encounter:    [START ON 2/29/2024] sodium chloride 0.9%   Intravenous Once    amLODIPine  5 mg Oral Daily    atropine 1%  1 drop Left Eye BID    brimonidine 0.15 % OPTH DROP  1 drop Both Eyes TID    carvediloL  25 mg Oral BID    cetirizine  10 mg Oral Daily    dorzolamide  1 drop Both Eyes BID    FLUoxetine  20 mg Oral Daily    fluticasone propionate  1 spray Each Nostril Daily    gabapentin  600 mg Oral BID    hydrALAZINE  100 mg Oral BID    isosorbide mononitrate  30 mg Oral Daily    levETIRAcetam  500 mg Oral BID    methazoLAMIDE  100 mg Oral TID    polyethylene glycol  17 g Oral Daily    prednisoLONE acetate  1 drop Left Eye QID    sevelamer carbonate  800 mg Oral TID WM    sucralfate  1 g Oral QID (WM & HS)    timolol maleate 0.5%  1 drop Both Eyes BID       Allergies: is allergic to penicillins.     ROS: As per HPI    Ocular examination/Dilated fundus examination:  Base Eye Exam       Visual Acuity (Snellen - Linear)         Right Left    Dist sc CF at 3 feet NLP   Patient unable/unwilling to participate in visual acuity             Tonometry (Tonopen, 9:11 AM)         Right Left    Pressure 20 51              Tonometry #2 (Tonopen, 9:11 AM)         Right Left    Pressure  51              Pupils         Dark Light Shape React    Right 3 3 Round  Minimal    Left 6 6 Round 0              Neuro/Psych       Oriented x3: Yes    Mood/Affect: Normal              Dilation       Right eye: 1% Mydriacyl @ 9:11 AM                  Slit Lamp and Fundus Exam       External Exam         Right Left    External Normal Normal              Slit Lamp Exam         Right Left    Lids/Lashes Normal Normal    Conjunctiva/Sclera White and quiet injection and chemosis    Cornea Clear MCE somewhat improved, no fluorescein uptake    Anterior Chamber Deep and formed 20-30% hyphema, overlying fibrin, improving    Iris Round and minimally reactive view only superior, appears flat    Lens Clear appears phakic, very poor view    Anterior Vitreous Normal no view              Fundus Exam         Right Left    Disc pallor     Macula flat, attached     Vessels attenuated, sclerotic     Periphery poor view, PRP scars at least nasal and inferior, some MA's near nerve, no obvious neovascularization                     Assessment/Plan:     1. Hyphema OS with elevated IOP likely 2/2 NVG  - VA 20/70 // LP or worse.   - IOP on presentation 20//79 improved to 13 after AC tap  - per patient, had retina surgery in left eye in January, unclear what surgery and with what surgeon this was performed   - 2/25/24: IOP 44, PT resting comfortably in bed.    - on Cosopt and Brimonidine  - + Sickle cell Trait  - 02/26/24: patient in pain again, IOP 49 initially; 36 with applanation  - patient denies being able to see light in left eye  - Discussed with Dr. Faith and Dr. Saez, ok to continue methazolamide at current dose, also add atropine and pred forte as below   - Also discussed with Dr. Priest, who may perform tube shunt surgery in near future depending on patient's clinical course  - ophtho will continue to follow while inpatient  - 02/27/24: Patient states she is in pain. Was resting comfortably prior to be woken. Eyeshield not on eye.   - IOP elevated at ~60 OS, somewhat elevated OD as well (25-30)  "(pt had not received drops or methazolamide yet this AM)  - hyphema appears to be improving  - patient states it is ok to call her mother to discuss health information. Called mother, Tanya who states she was scheduled for outpatient appt today 02/27 in [place], possibly at [facility]  - Also discussed with Dr. Garay (retina) plan to see patient again tomorrow and dilate right eye (unaffected eye)  - Given poor prognosis and visual outcome in left eye, unlikely for surgical intervention at this time, will likely be able to follow as outpatient  - can start Cosopt and Brimonidine in right eye as well (orders placed)  - Will follow while inpatient  - 02/28/24: patient in no acute distress.  IOP 20//51.   - unclear visual acuity in right eye, but at least count fingers at 4-5 feet  - right eye dilated, retina flat and attached with chronic changes from diabetic retinopathy  - no intervention while inpatient from ophthalmology standpoint, ok to discharge  - called mother (tanya) who prefers to follow up in [place] in possible     Final Recommendations:   - Ok to discharge from ophthalmology standpoint  - Timolol/Dorzolomide (Cosopt) BID both eyes  - Brimonidine TID both eyes  - PO Methazolamide 100mg TID   - Atropine BID Left eye  - Pred Forte QID Left eye  - Head of bed elevation when possible  - Wait 5-10 min in between drops     Discussed with schedulers, patient to follow up as outpatient in [place] with Dr. Garay (retina) and Dr. Wise (glaucoma) within 1 week  Staff will call mother with final appointment times    Patient seen with Dr. Faith      Patient's Best Contact Number: [phone] (Step mother, Tanya Howard)      Nura Kerr MD (Brad)   [facility], PGY-2  02/28/2024  9:15 AM    I have personally interviewed the patient, reviewed the history and examined the patient and agree with the resident's exam, assessment and plan.   "

## 2024-02-28 NOTE — DISCHARGE SUMMARY
[facility] Medicine  Discharge Summary      Patient Name: Tabby Howard  MRN: [id number]  UNIQUE: [id number]  Patient Class: IP- Inpatient  Admission Date: 2/24/2024  Hospital Length of Stay: 2 days  Discharge Date and Time:  02/28/2024 1:20 PM  Attending Physician: Christiano Nair III*   Discharging Provider: Maxwell Farah MD  Primary Care Provider: Melony Kim NP  [facility] Medicine Team: [facility] 4 Maxwell Farah MD  Primary Care Team: [facility] 4    HPI:   Patient is a 34F with HTN, T2DM c/b gastroparesis, sickle cell trait, SVT, and ESRD on HD (TTS), here for severe pain following retinal detachment surgery 2 weeks ago. Patient transferred from [facility] for an emergent Ophthalmology consult. Reportedly, the patient is approximately 2 weeks status post left eye surgery for retinal detachment performed at [facility]. Did complete follow-up with her glaucoma specialist, had progressive eye pain that was initially managed with eye drops, however progressed to an intolerable pain level for which she sought care at the ED. Associated decreased L visual acuity. Underwent ophthalmology eval on 2/24, had acute glaucoma with increased IOP 80. IOP on presentation 20//79 improved to 13 after AC tap. IV Diamox given for IOP, Dorzolamide, brimonidine, and timolol given for IOP. Continued to have pain despite intervention and reduction of IOP.     In ED, /110, no hypoxia (on 2L NC initially, however no desaturation recorded), afebrile, no tachycardia. Received fentanyl for pain control. Underwent AC tap with reduction in IOP. CBC notable for thrombocytopenia (PLT 99), no leukocytosis, stable anemia (Hb 10.7). CMP c/w ESRD, no severe lyte abnormalities. Hyperglycemic to 295. Procal elevated 0.5, troponin normal.     * No surgery found *      Hospital Course:   Patient admitted w/ severe eye pain, found to have elevated IOP, now s/p AC tap and drain.  Patient started on various medicated eye drops in order to decrease IOP, as AC tap only temporizing measure. Patient continued to complain of eye pain, increased pain medication frequency, discussed with ophthalmology, and patient should continue on current regimen. Per ophthalmology, patient will need to continue current eye drop regimen and to follow-up with a glaucoma, and retinal specialist in [place]. Spoke to step-mother, who stated that she has already been contacted about following up. 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 Patient and caregiver understand the follow-up plan. Patient deemed medically stable for discharge.     Goals of Care Treatment Preferences:  Code Status: Full Code    Health care agent: Step-Mother Tanya Howard / Father Garcia Howard  Health care agent number: [phone] / [phone]                   Consults:   Consults (From admission, onward)          Status Ordering Provider     Inpatient consult to Nephrology  Once        Provider:  (Not yet assigned)    Completed EMMA VARELA     Inpatient consult to Ophthalmology  Once        Provider:  (Not yet assigned)    Completed LUIZ GARCIA            No new Assessment & Plan notes have been filed under this hospital service since the last note was generated.  Service: Hospital Medicine    Final Active Diagnoses:    Diagnosis Date Noted POA    PRINCIPAL PROBLEM:  Acute glaucoma of left eye [H40.9] 02/25/2024 Yes    Hypertension [I10] 06/17/2023 Yes    Anemia in ESRD (end-stage renal disease) [N18.6, D63.1] 03/20/2023 Yes    Type 2 diabetes mellitus with hyperglycemia, with long-term current use of insulin, previous HbA1c 5.8% [E11.65, Z79.4] 01/27/2023 Not Applicable    Adjustment disorder [F43.20] 12/07/2022 Yes    Thrombocytopenia [D69.6] 10/26/2022 Yes     Chronic    Deep vein thrombosis  (DVT) of non-extremity vein [I82.90] 07/26/2022 Yes     Chronic    ESRD (end stage renal disease) [N18.6] 07/22/2022 Yes    Seizure disorder [G40.909] 05/29/2022 Yes     Chronic      Problems Resolved During this Admission:       Discharged Condition: good    Disposition:     Follow Up:   Follow-up Information       Melony Kim NP Follow up.    Specialty: Nurse Practitioner  Contact information:  [place] [address]  [phone]                           Patient Instructions:      Ambulatory referral/consult to Hematology / Oncology   Standing Status: Future   Referral Priority: Routine Referral Type: Consultation   Referral Reason: Specialty Services Required   Requested Specialty: Hematology and Oncology   Number of Visits Requested: 1       Significant Diagnostic Studies: N/A    Pending Diagnostic Studies:       None           Medications:  Reconciled Home Medications:      Medication List        START taking these medications      atropine 1% 1 % Drop  Commonly known as: ISOPTO ATROPINE  Place 1 drop into the left eye 2 (two) times a day.     brimonidine 0.15 % OPTH DROP 0.15 % ophthalmic solution  Commonly known as: ALPHAGAN  Place 1 drop into both eyes 3 (three) times daily.     dorzolamide 2 % ophthalmic solution  Commonly known as: TRUSOPT  Place 1 drop into both eyes 2 (two) times a day.     methazoLAMIDE 50 MG Tab  Commonly known as: NEPTAZANE  Take 2 tablets (100 mg total) by mouth 3 (three) times daily.     oxyCODONE-acetaminophen  mg per tablet  Commonly known as: PERCOCET  Take 1 tablet by mouth every 4 (four) hours as needed for Pain.  Replaces: oxyCODONE-acetaminophen 5-325 mg per tablet            CHANGE how you take these medications      amLODIPine 5 MG tablet  Commonly known as: NORVASC  Take 1 tablet (5 mg total) by mouth once daily.  Start taking on: February 29, 2024  What changed:   how much to take  how to take this  when to take this  Another medication with  "the same name was removed. Continue taking this medication, and follow the directions you see here.     apixaban 5 mg Tab  Commonly known as: ELIQUIS  Take 1 tablet (5 mg total) by mouth 2 (two) times daily. Patient w/ thrombocytopenia <50, so held during admission, follow-up with hematologist about restarting  What changed: additional instructions     * BD ULTRA-FINE MINI PEN NEEDLE 31 gauge x 3/16" Ndle  Generic drug: pen needle, diabetic  Use as directed to inject insulin 5 times daily  What changed: Another medication with the same name was added. Make sure you understand how and when to take each.     * BD ULTRA-FINE MINI PEN NEEDLE 31 gauge x 3/16" Ndle  Generic drug: pen needle, diabetic  Use as directed with insulin once daily  What changed: You were already taking a medication with the same name, and this prescription was added. Make sure you understand how and when to take each.     busPIRone 10 MG tablet  Commonly known as: BUSPAR  Take 1 tablet (10 mg total) by mouth 3 (three) times daily as needed (anxiety).  What changed:   how much to take  how to take this  when to take this  reasons to take this     fluticasone propionate 50 mcg/actuation nasal spray  Commonly known as: FLONASE ALLERGY RELIEF  Spray 1 spray every day by intranasal route.  What changed:   how much to take  when to take this     gabapentin 300 MG capsule  Commonly known as: NEURONTIN  Take 2 capsules twice a day by oral route for 90 days.  What changed:   how much to take  how to take this  when to take this     LEVEMIR FLEXPEN 100 unit/mL (3 mL) Inpn pen  Generic drug: insulin detemir U-100 (Levemir)  Inject 22 Units into the skin every evening. Patient not needing insulin in-patient. Follow-up with PCP for hospital follow-up and restarting the medication. Or restart medication if glucose >200 consistently resume home insulin regimen. Or if glucose is persistently less than 100, decrease by 5 units until following up with PCP  What " changed:   how much to take  when to take this  additional instructions     levETIRAcetam 500 MG Tab  Commonly known as: KEPPRA  Take 1 tablet (500 mg total) by mouth 2 (two) times daily.  What changed:   how much to take  when to take this  Another medication with the same name was removed. Continue taking this medication, and follow the directions you see here.     prednisoLONE acetate 1 % Drps  Commonly known as: PRED FORTE  Place 1 drop into the left eye 4 (four) times daily. for 10 days  What changed:   how much to take  how to take this  when to take this     sevelamer carbonate 800 mg Tab  Commonly known as: RENVELA  Take 1 tablet (800 mg total) by mouth 3 (three) times daily with meals.  What changed:   how much to take  when to take this     sucralfate 100 mg/mL suspension  Commonly known as: CARAFATE  Take 10 mL 4 times a day by oral route before meals for 30 days.  What changed:   how much to take  how to take this  when to take this           * This list has 2 medication(s) that are the same as other medications prescribed for you. Read the directions carefully, and ask your doctor or other care provider to review them with you.                CONTINUE taking these medications      carvediloL 25 MG tablet  Commonly known as: COREG  Take 1 tablet twice a day by oral route for 30 days.     cetirizine 10 MG tablet  Commonly known as: ZYRTEC  Take 1 tablet every day by oral route.     FLUoxetine 20 MG capsule  Take 1 capsule every day by oral route for 90 days.     hydrALAZINE 100 MG tablet  Commonly known as: APRESOLINE  Take 1 tablet (100 mg total) by mouth 2 (two) times daily.     isosorbide mononitrate 30 MG 24 hr tablet  Commonly known as: IMDUR  Take 1 tablet (30 mg total) by mouth once daily.     * lancets 33 gauge Misc  Use to test twice daily     * TRUEPLUS LANCETS 33 gauge Misc  Generic drug: lancets  Use 1 to check blood glucose three times daily     ondansetron 4 MG Tbdl  Commonly known as:  ZOFRAN-ODT  Place 1 tablet (4 mg) on or under the tongue every 8 hours for 10 days.     timolol maleate 0.5% 0.5 % Drop  Commonly known as: TIMOPTIC  Place 1 drop into both eyes 2 (two) times daily.     TRUE METRIX GLUCOSE METER Misc  Generic drug: blood-glucose meter  Use to check blood glucose     TRUE METRIX GLUCOSE TEST STRIP Strp  Generic drug: blood sugar diagnostic  Use 1 strip to check blood glucose three times daily           * This list has 2 medication(s) that are the same as other medications prescribed for you. Read the directions carefully, and ask your doctor or other care provider to review them with you.                STOP taking these medications      acetaZOLAMIDE 250 MG tablet  Commonly known as: DIAMOX     brinzolamide 1 % ophthalmic suspension  Commonly known as: AZOPT     ciprofloxacin HCl 0.3 % ophthalmic solution  Commonly known as: CILOXAN     insulin aspart U-100 100 unit/mL (3 mL) Inpn pen  Commonly known as: NovoLOG Flexpen U-100 Insulin     ketorolac 0.5% 0.5 % Drop  Commonly known as: ACULAR     LANTUS SOLOSTAR U-100 INSULIN glargine 100 units/mL SubQ pen  Generic drug: insulin     oxyCODONE-acetaminophen 5-325 mg per tablet  Commonly known as: PERCOCET  Replaced by: oxyCODONE-acetaminophen  mg per tablet     pantoprazole 40 MG tablet  Commonly known as: PROTONIX     promethazine 25 MG tablet  Commonly known as: PHENERGAN     VELPHORO 500 mg Chew  Generic drug: sucroferric oxyhydroxide              Indwelling Lines/Drains at time of discharge:   Lines/Drains/Airways       Central Venous Catheter Line  Duration                  Hemodialysis Catheter 06/17/23 1135 right internal jugular 256 days                    Time spent on the discharge of patient: 35 minutes         Maxwell Farah MD  Department of Hospital Medicine  [facility]

## 2024-03-06 ENCOUNTER — HOSPITAL ENCOUNTER (EMERGENCY)
Facility: HOSPITAL | Age: 34
Discharge: HOME | End: 2024-03-06
Attending: EMERGENCY MEDICINE
Payer: MEDICAID

## 2024-03-06 VITALS
SYSTOLIC BLOOD PRESSURE: 107 MMHG | HEIGHT: 62 IN | DIASTOLIC BLOOD PRESSURE: 69 MMHG | RESPIRATION RATE: 18 BRPM | WEIGHT: 116 LBS | OXYGEN SATURATION: 98 % | BODY MASS INDEX: 21.35 KG/M2 | TEMPERATURE: 98 F | HEART RATE: 70 BPM

## 2024-03-06 DIAGNOSIS — H40.9 GLAUCOMA OF LEFT EYE, UNSPECIFIED GLAUCOMA TYPE: Primary | ICD-10-CM

## 2024-03-06 DIAGNOSIS — Z99.2 ESRD ON DIALYSIS: ICD-10-CM

## 2024-03-06 DIAGNOSIS — N18.6 ESRD ON DIALYSIS: ICD-10-CM

## 2024-03-06 LAB
ANION GAP SERPL CALC-SCNC: 12 MMOL/L (ref 8–16)
BUN SERPL-MCNC: 26 MG/DL (ref 6–20)
CALCIUM SERPL-MCNC: 8.9 MG/DL (ref 8.7–10.5)
CHLORIDE SERPL-SCNC: 98 MMOL/L (ref 95–110)
CO2 SERPL-SCNC: 29 MMOL/L (ref 23–29)
CREAT SERPL-MCNC: 9 MG/DL (ref 0.5–1.4)
EST. GFR  (NO RACE VARIABLE): 5.4 ML/MIN/1.73 M^2
GLUCOSE SERPL-MCNC: 104 MG/DL (ref 70–110)
POTASSIUM SERPL-SCNC: 3.7 MMOL/L (ref 3.5–5.1)
SODIUM SERPL-SCNC: 139 MMOL/L (ref 136–145)

## 2024-03-06 PROCEDURE — 96372 THER/PROPH/DIAG INJ SC/IM: CPT | Mod: 59 | Performed by: EMERGENCY MEDICINE

## 2024-03-06 PROCEDURE — 80048 BASIC METABOLIC PNL TOTAL CA: CPT | Performed by: EMERGENCY MEDICINE

## 2024-03-06 PROCEDURE — 63600175 PHARM REV CODE 636 W HCPCS: Performed by: EMERGENCY MEDICINE

## 2024-03-06 PROCEDURE — 96375 TX/PRO/DX INJ NEW DRUG ADDON: CPT

## 2024-03-06 PROCEDURE — 99284 EMERGENCY DEPT VISIT MOD MDM: CPT | Mod: 25

## 2024-03-06 PROCEDURE — 25000003 PHARM REV CODE 250: Performed by: EMERGENCY MEDICINE

## 2024-03-06 PROCEDURE — 96365 THER/PROPH/DIAG IV INF INIT: CPT

## 2024-03-06 RX ORDER — MORPHINE SULFATE 2 MG/ML
6 INJECTION, SOLUTION INTRAMUSCULAR; INTRAVENOUS
Status: COMPLETED | OUTPATIENT
Start: 2024-03-06 | End: 2024-03-06

## 2024-03-06 RX ORDER — TIMOLOL MALEATE 5 MG/ML
1 SOLUTION/ DROPS OPHTHALMIC 2 TIMES DAILY
Status: DISCONTINUED | OUTPATIENT
Start: 2024-03-06 | End: 2024-03-06 | Stop reason: HOSPADM

## 2024-03-06 RX ORDER — ONDANSETRON HYDROCHLORIDE 2 MG/ML
4 INJECTION, SOLUTION INTRAVENOUS
Status: COMPLETED | OUTPATIENT
Start: 2024-03-06 | End: 2024-03-06

## 2024-03-06 RX ORDER — BRIMONIDINE TARTRATE 1.5 MG/ML
1 SOLUTION/ DROPS OPHTHALMIC EVERY 8 HOURS
Status: DISCONTINUED | OUTPATIENT
Start: 2024-03-06 | End: 2024-03-06 | Stop reason: HOSPADM

## 2024-03-06 RX ORDER — OXYCODONE AND ACETAMINOPHEN 5; 325 MG/1; MG/1
1 TABLET ORAL
Status: COMPLETED | OUTPATIENT
Start: 2024-03-06 | End: 2024-03-06

## 2024-03-06 RX ORDER — ATROPINE SULFATE 10 MG/ML
1 SOLUTION/ DROPS OPHTHALMIC 2 TIMES DAILY
Status: DISCONTINUED | OUTPATIENT
Start: 2024-03-06 | End: 2024-03-06 | Stop reason: HOSPADM

## 2024-03-06 RX ORDER — HYDROMORPHONE HYDROCHLORIDE 1 MG/ML
0.5 INJECTION, SOLUTION INTRAMUSCULAR; INTRAVENOUS; SUBCUTANEOUS
Status: COMPLETED | OUTPATIENT
Start: 2024-03-06 | End: 2024-03-06

## 2024-03-06 RX ORDER — TETRACAINE HYDROCHLORIDE 5 MG/ML
2 SOLUTION OPHTHALMIC
Status: COMPLETED | OUTPATIENT
Start: 2024-03-06 | End: 2024-03-06

## 2024-03-06 RX ORDER — DORZOLAMIDE HCL 20 MG/ML
1 SOLUTION/ DROPS OPHTHALMIC 2 TIMES DAILY
Status: DISCONTINUED | OUTPATIENT
Start: 2024-03-06 | End: 2024-03-06 | Stop reason: HOSPADM

## 2024-03-06 RX ADMIN — TETRACAINE HYDROCHLORIDE 2 DROP: 5 SOLUTION OPHTHALMIC at 01:03

## 2024-03-06 RX ADMIN — DEXTROSE 250 MG: 50 INJECTION, SOLUTION INTRAVENOUS at 03:03

## 2024-03-06 RX ADMIN — HYDROMORPHONE HYDROCHLORIDE 0.5 MG: 0.5 INJECTION, SOLUTION INTRAMUSCULAR; INTRAVENOUS; SUBCUTANEOUS at 05:03

## 2024-03-06 RX ADMIN — BRIMONIDINE TARTRATE OPHTHALMIC SOLUTION, 0.15% 1 DROP: 1.5 SOLUTION/ DROPS OPHTHALMIC at 05:03

## 2024-03-06 RX ADMIN — ONDANSETRON 4 MG: 2 INJECTION INTRAMUSCULAR; INTRAVENOUS at 05:03

## 2024-03-06 RX ADMIN — ATROPINE SULFATE 1 DROP: 10 SOLUTION/ DROPS OPHTHALMIC at 05:03

## 2024-03-06 RX ADMIN — OXYCODONE HYDROCHLORIDE AND ACETAMINOPHEN 1 TABLET: 5; 325 TABLET ORAL at 01:03

## 2024-03-06 RX ADMIN — DORZOLAMIDE HYDROCHLORIDE 1 DROP: 20 SOLUTION/ DROPS OPHTHALMIC at 07:03

## 2024-03-06 RX ADMIN — TIMOLOL MALEATE 1 DROP: 5 SOLUTION OPHTHALMIC at 05:03

## 2024-03-06 RX ADMIN — MORPHINE SULFATE 6 MG: 2 INJECTION, SOLUTION INTRAMUSCULAR; INTRAVENOUS at 02:03

## 2024-03-06 NOTE — ED PROVIDER NOTES
Encounter Date: 3/6/2024       History     Chief Complaint   Patient presents with    Eye Pain     Pt had retinal detachment surgery last month and was told to follow up to possible drain fluid. Pt states she never followed up and is complaining of pain to left eye.     Pt w/ PMHx HTN, T2DM c/b gastroparesis, sickle cell trait, SVT, and ESRD on HD (TTS) presents to ED w/ acute on chronic left eye pain gradually worsening over the last couple days.  Pt w/ recent admit at [facility] for acute glaucoma of the left eye w/ increased IOP - following a retinal detachment surgery at an outside facility in late January.  She underwent AC tap and drain.  She denies any new vision changes, stating overall her vision is very poor.  She reports compliance with home medications including drops.  She has overall a very poor historian and additional history was confirmed via her chart review as well as discussion with her stepmother.  Her stepmother confirms compliance via telephone, stating that she is assisting her with medications.  Her next follow-up visit is with glaucoma specialist, Dr. Wise, on .  Also has follow up with retina specialist, Dr. Garay, next week on the .  Patient denies any missed HD sessions.      Review of patient's allergies indicates:   Allergen Reactions    Penicillins Hives     Past Medical History:   Diagnosis Date    ESRD on hemodialysis 2022    Gastritis 2022    EGD was 21    Gastroparesis 2022    has not had Emptying study    Heart failure with preserved ejection fraction 2022    EF 55% on 3/22    History of supraventricular tachycardia     Hyperkalemia 2022    Hypertensive emergency 2022    Sickle cell trait 2022    Type 2 diabetes mellitus      Past Surgical History:   Procedure Laterality Date     SECTION      x 3    COLONOSCOPY      COLONOSCOPY N/A 2022    Procedure: COLONOSCOPY;  Surgeon: Jagdeep Cedeno MD;   Location: [facility];  Service: Endoscopy;  Laterality: N/A;    ENDOSCOPIC ULTRASOUND OF UPPER GASTROINTESTINAL TRACT N/A 12/16/2023    Procedure: ULTRASOUND, UPPER GI TRACT, ENDOSCOPIC;  Surgeon: Micky Paredes III, MD;  Location: [facility];  Service: Endoscopy;  Laterality: N/A;    ESOPHAGOGASTRODUODENOSCOPY N/A 10/18/2019    Procedure: ESOPHAGOGASTRODUODENOSCOPY (EGD);  Surgeon: Gianluca Mendez MD;  Location: [facility];  Service: Endoscopy;  Laterality: N/A;    ESOPHAGOGASTRODUODENOSCOPY N/A 08/24/2022    Procedure: EGD (ESOPHAGOGASTRODUODENOSCOPY);  Surgeon: Micky Paredes III, MD;  Location: [facility];  Service: Endoscopy;  Laterality: N/A;    ESOPHAGOGASTRODUODENOSCOPY N/A 12/5/2022    Procedure: EGD (ESOPHAGOGASTRODUODENOSCOPY);  Surgeon: Marcelo Zhong MD;  Location: [facility] ENDO;  Service: Endoscopy;  Laterality: N/A;    INSERTION, CATHETER, TUNNELED N/A 6/17/2023    Procedure: Insertion,catheter,tunneled;  Surgeon: Carlos Thurman Jr., MD;  Location: [facility] OR;  Service: General;  Laterality: N/A;    LAPAROSCOPIC CHOLECYSTECTOMY N/A 07/30/2022    Procedure: CHOLECYSTECTOMY, LAPAROSCOPIC;  Surgeon: Grey Perez MD;  Location: [facility] OR;  Service: General;  Laterality: N/A;    PLACEMENT OF DUAL-LUMEN VASCULAR CATHETER Left 07/12/2022    Procedure: INSERTION-CATHETER-JOSEPH;  Surgeon: Dionte Gan MD;  Location: [facility] OR;  Service: General;  Laterality: Left;    PLACEMENT OF DUAL-LUMEN VASCULAR CATHETER Right 07/26/2022    Procedure: INSERTION-CATHETER-Hemosplit;  Surgeon: Dionte Gan MD;  Location: [facility] OR;  Service: General;  Laterality: Right;     Family History   Problem Relation Age of Onset    Diabetes Mother     Diabetes Father      Social History     Tobacco Use    Smoking status: Never    Smokeless tobacco: Never   Substance Use Topics    Alcohol use: Not Currently    Drug use: No     Review of Systems   Constitutional:  Negative for fever.   Eyes:  Positive for pain. Negative  for photophobia and visual disturbance.   Cardiovascular:  Negative for chest pain.   Neurological:  Positive for headaches. Negative for weakness and numbness.       Physical Exam     Initial Vitals [03/06/24 1138]   BP Pulse Resp Temp SpO2   135/87 77 18 97.9 °F (36.6 °C) 97 %      MAP       --         Physical Exam    Nursing note and vitals reviewed.  Constitutional: She appears well-developed and well-nourished.   Tearful, anxious and uncomfortable appearing.   HENT:   Head: Normocephalic and atraumatic.   Mouth/Throat: Oropharynx is clear and moist.   Eyes:   Rt eye:  Clear conjunctiva, pupil 2 mm and slightly reactive.  IOP average 22.  Left eye:  Cloudy cornea, pupil nonreactive.  Marked conjunctival injection and chemosis.  Patient seems to have extraocular muscles intact but exam is limited by pain.  IOP max 55.   Neck: Neck supple.   Normal range of motion.  Cardiovascular:  Normal rate and regular rhythm.           Pulmonary/Chest: Breath sounds normal. No respiratory distress.   Abdominal: Abdomen is soft. There is no abdominal tenderness. There is no rebound and no guarding.   Musculoskeletal:         General: No tenderness or edema. Normal range of motion.      Cervical back: Normal range of motion and neck supple.     Neurological: She is alert and oriented to person, place, and time. She has normal strength. No cranial nerve deficit. GCS score is 15. GCS eye subscore is 4. GCS verbal subscore is 5. GCS motor subscore is 6.   Skin: Skin is warm and dry. Capillary refill takes less than 2 seconds. No rash noted.   Psychiatric: She has a normal mood and affect. Thought content normal.         ED Course   Procedures  Labs Reviewed   BASIC METABOLIC PANEL - Abnormal; Notable for the following components:       Result Value    BUN 26 (*)     Creatinine 9.0 (*)     eGFR 5.4 (*)     All other components within normal limits          Imaging Results    None          Medications   timolol maleate 0.5%  ophthalmic solution 1 drop (1 drop Both Eyes Given 3/6/24 1752)   atropine 1% ophthalmic solution 1 drop (1 drop Left Eye Given 3/6/24 1752)   brimonidine 0.15 % OPTH DROP ophthalmic solution 1 drop (1 drop Both Eyes Given 3/6/24 1752)   dorzolamide 2 % ophthalmic solution 1 drop (1 drop Both Eyes Given 3/6/24 1953)   TETRAcaine HCl (PF) 0.5 % Drop 2 drop (2 drops Both Eyes Given 3/6/24 1342)   oxyCODONE-acetaminophen 5-325 mg per tablet 1 tablet (1 tablet Oral Given 3/6/24 1341)   morphine injection 6 mg (6 mg Intramuscular Given 3/6/24 1407)   acetaZOLAMIDE (DIAMOX) 250 mg in dextrose 5 % (D5W) 50 mL (0 mg Intravenous Stopped 3/6/24 1627)   HYDROmorphone injection 0.5 mg (0.5 mg Intravenous Given 3/6/24 1724)   ondansetron injection 4 mg (4 mg Intravenous Given 3/6/24 1724)     Medical Decision Making  Patient presents to ED as above.  Her initial IOP is 55.  Listed as 51 on day of discharge.  Patient overall poor historian but seems to be a gradual worsening as opposed to sudden onset today.  I discussed case with Dr. Wise who recommends IV Diamox now which she was given.  After this and pain medication, her pressure reduced to 42.  She was additionally given her home glaucoma drops and pressure reduced to 28 on subsequent recheck.  She appears more comfortable.  Her BMP shows stable electrolytes.  Dr. Wise will see in clinic tomorrow morning at 8:15am.  I discussed this with patient as well as her stepmother via telephone who confirms that she will make sure patient attends the appointment.  She appears stable for discharge at this time.      Amount and/or Complexity of Data Reviewed  Independent Historian: caregiver  Labs: ordered. Decision-making details documented in ED Course.     Details: BMP    Risk  Prescription drug management.  Decision regarding hospitalization.                                      Clinical Impression:  Final diagnoses:  [H40.9] Glaucoma of left eye, unspecified glaucoma type  (Primary)  [N18.6, Z99.2] ESRD on dialysis          ED Disposition Condition    Discharge Stable          ED Prescriptions    None       Follow-up Information       Follow up With Specialties Details Why Contact Info    Fidel Wise MD Ophthalmology  Tomorrow, 3/7/24 at 8:15am.  Please bring all medications/drops to the appointment. [address] [place] [address] [address]  [facility]  [place] [address]  [phone]               Adriana Rodriguez MD  03/06/24 2018

## 2024-03-07 ENCOUNTER — OFFICE VISIT (OUTPATIENT)
Dept: OPHTHALMOLOGY | Facility: CLINIC | Age: 34
End: 2024-03-07
Payer: MEDICAID

## 2024-03-07 ENCOUNTER — TELEPHONE (OUTPATIENT)
Dept: OPHTHALMOLOGY | Facility: CLINIC | Age: 34
End: 2024-03-07

## 2024-03-07 DIAGNOSIS — H40.52X3 NEOVASCULAR GLAUCOMA, LEFT EYE, SEVERE STAGE: Primary | ICD-10-CM

## 2024-03-07 DIAGNOSIS — H54.40 BLIND PAINFUL LEFT EYE: ICD-10-CM

## 2024-03-07 DIAGNOSIS — H57.12 BLIND PAINFUL LEFT EYE: ICD-10-CM

## 2024-03-07 PROCEDURE — 1111F DSCHRG MED/CURRENT MED MERGE: CPT | Mod: CPTII,,, | Performed by: OPHTHALMOLOGY

## 2024-03-07 PROCEDURE — 99999 PR PBB SHADOW E&M-EST. PATIENT-LVL III: CPT | Mod: PBBFAC,,, | Performed by: OPHTHALMOLOGY

## 2024-03-07 PROCEDURE — 99214 OFFICE O/P EST MOD 30 MIN: CPT | Mod: 57,S$PBB,, | Performed by: OPHTHALMOLOGY

## 2024-03-07 PROCEDURE — 1159F MED LIST DOCD IN RCRD: CPT | Mod: CPTII,,, | Performed by: OPHTHALMOLOGY

## 2024-03-07 PROCEDURE — 1160F RVW MEDS BY RX/DR IN RCRD: CPT | Mod: CPTII,,, | Performed by: OPHTHALMOLOGY

## 2024-03-07 PROCEDURE — 3066F NEPHROPATHY DOC TX: CPT | Mod: CPTII,,, | Performed by: OPHTHALMOLOGY

## 2024-03-07 PROCEDURE — 99213 OFFICE O/P EST LOW 20 MIN: CPT | Mod: PBBFAC,PO | Performed by: OPHTHALMOLOGY

## 2024-03-07 RX ORDER — PHENYLEPHRINE HYDROCHLORIDE 25 MG/ML
1 SOLUTION/ DROPS OPHTHALMIC
Status: CANCELLED | OUTPATIENT
Start: 2024-03-07

## 2024-03-07 RX ORDER — SODIUM CHLORIDE 9 MG/ML
INJECTION, SOLUTION INTRAVENOUS CONTINUOUS
Status: CANCELLED | OUTPATIENT
Start: 2024-03-07

## 2024-03-07 RX ORDER — PROPARACAINE HYDROCHLORIDE 5 MG/ML
1 SOLUTION/ DROPS OPHTHALMIC
Status: CANCELLED | OUTPATIENT
Start: 2024-03-07

## 2024-03-07 RX ORDER — CHLORPROMAZINE HYDROCHLORIDE 25 MG/ML
25 INJECTION INTRAMUSCULAR ONCE
Status: CANCELLED | OUTPATIENT
Start: 2024-03-07 | End: 2024-03-07

## 2024-03-07 NOTE — PROGRESS NOTES
HPI    Pt presents for hospital follow up     States had surgery with Dr Richardson about 3 weeks ago for RD OS     States pain in the eye started 3 weeks ago. States saw Dr LEON martinez in   February.     Pain is a 10/10, pt unable to open eye.     Brim TID OS   PF TID OS   Keto TID OS   Timolol TID OS             Last edited by Yvonne Snider on 3/7/2024  8:10 AM.            Assessment /Plan     For exam results, see Encounter Report.    Neovascular glaucoma, left eye, severe stage  -     Case Request Operating Room: Cyclophotocoagulation G-probe, retrobulbar chlorpromazine left eye  -     Place in Outpatient; Standing  -     Vital signs; Standing  -     Insert peripheral IV; Standing  -     Diet NPO; Standing    Blind painful left eye    Other orders  -     0.9%  NaCl infusion  -     phenylephrine HCL 2.5% ophthalmic solution 1 drop  -     proparacaine 0.5 % ophthalmic solution 1 drop  -     chlorproMAZINE injection 25 mg      Severe NVG OS  NLP eye  IOP reasonable at 31 today, much better than ED visit yesterday  Pain still present, pt does have chronic pain issues    Discussed options for pain control, including CPC with retrobulbar chlorpromazine injection, vs eye removal    Pt agrees to CPC with retrobulbar injection, consent obtained  Discussed risk of failure of pain control and still need for eye removal    Proceed with CPC OS with retrobulbar chlorpromazine  Scheduled for tomorrow  Pt aware that vision will not return, procedure only for pain control    Continue  Dorz/remberto bid OS  Brim bid OS  PF QID OS  Atropine BID OS  Methazolamide PO BID

## 2024-03-07 NOTE — DISCHARGE INSTRUCTIONS
Please continue your current home medications and eyedrops.  Please keep the appointment tomorrow as scheduled.

## 2024-03-08 ENCOUNTER — HOSPITAL ENCOUNTER (OUTPATIENT)
Facility: HOSPITAL | Age: 34
Discharge: HOME | End: 2024-03-08
Attending: OPHTHALMOLOGY | Admitting: OPHTHALMOLOGY
Payer: MEDICAID

## 2024-03-08 ENCOUNTER — ANESTHESIA EVENT (OUTPATIENT)
Dept: SURGERY | Facility: HOSPITAL | Age: 34
End: 2024-03-08
Payer: MEDICAID

## 2024-03-08 ENCOUNTER — ANESTHESIA (OUTPATIENT)
Dept: SURGERY | Facility: HOSPITAL | Age: 34
End: 2024-03-08
Payer: MEDICAID

## 2024-03-08 VITALS
OXYGEN SATURATION: 99 % | BODY MASS INDEX: 21.53 KG/M2 | SYSTOLIC BLOOD PRESSURE: 144 MMHG | HEART RATE: 79 BPM | HEIGHT: 62 IN | RESPIRATION RATE: 16 BRPM | WEIGHT: 117 LBS | TEMPERATURE: 98 F | DIASTOLIC BLOOD PRESSURE: 83 MMHG

## 2024-03-08 DIAGNOSIS — H40.52X3 NEOVASCULAR GLAUCOMA, LEFT EYE, SEVERE STAGE: ICD-10-CM

## 2024-03-08 LAB — POCT GLUCOSE: 95 MG/DL (ref 70–110)

## 2024-03-08 PROCEDURE — 71000015 HC POSTOP RECOV 1ST HR: Performed by: OPHTHALMOLOGY

## 2024-03-08 PROCEDURE — 27201423 OPTIME MED/SURG SUP & DEVICES STERILE SUPPLY: Performed by: OPHTHALMOLOGY

## 2024-03-08 PROCEDURE — 63600175 PHARM REV CODE 636 W HCPCS: Performed by: NURSE ANESTHETIST, CERTIFIED REGISTERED

## 2024-03-08 PROCEDURE — 36000707: Performed by: OPHTHALMOLOGY

## 2024-03-08 PROCEDURE — 25000003 PHARM REV CODE 250: Performed by: ANESTHESIOLOGY

## 2024-03-08 PROCEDURE — 66710 CILIARY TRANSSLERAL THERAPY: CPT | Mod: LT,,, | Performed by: OPHTHALMOLOGY

## 2024-03-08 PROCEDURE — D9220A PRA ANESTHESIA: Mod: CRNA,,, | Performed by: NURSE ANESTHETIST, CERTIFIED REGISTERED

## 2024-03-08 PROCEDURE — 25000003 PHARM REV CODE 250: Performed by: NURSE ANESTHETIST, CERTIFIED REGISTERED

## 2024-03-08 PROCEDURE — D9220A PRA ANESTHESIA: Mod: ANES,,, | Performed by: ANESTHESIOLOGY

## 2024-03-08 PROCEDURE — 25000003 PHARM REV CODE 250: Performed by: OPHTHALMOLOGY

## 2024-03-08 PROCEDURE — 82962 GLUCOSE BLOOD TEST: CPT | Performed by: OPHTHALMOLOGY

## 2024-03-08 PROCEDURE — 63600175 PHARM REV CODE 636 W HCPCS: Performed by: OPHTHALMOLOGY

## 2024-03-08 PROCEDURE — 37000009 HC ANESTHESIA EA ADD 15 MINS: Performed by: OPHTHALMOLOGY

## 2024-03-08 PROCEDURE — 37000008 HC ANESTHESIA 1ST 15 MINUTES: Performed by: OPHTHALMOLOGY

## 2024-03-08 PROCEDURE — 71000044 HC DOSC ROUTINE RECOVERY FIRST HOUR: Performed by: OPHTHALMOLOGY

## 2024-03-08 PROCEDURE — 36000706: Performed by: OPHTHALMOLOGY

## 2024-03-08 RX ORDER — DEXAMETHASONE SODIUM PHOSPHATE 4 MG/ML
INJECTION, SOLUTION INTRA-ARTICULAR; INTRALESIONAL; INTRAMUSCULAR; INTRAVENOUS; SOFT TISSUE
Status: DISCONTINUED
Start: 2024-03-08 | End: 2024-03-08 | Stop reason: WASHOUT

## 2024-03-08 RX ORDER — PHENYLEPHRINE HYDROCHLORIDE 25 MG/ML
1 SOLUTION/ DROPS OPHTHALMIC
Status: DISCONTINUED | OUTPATIENT
Start: 2024-03-08 | End: 2024-03-08 | Stop reason: HOSPADM

## 2024-03-08 RX ORDER — INDOCYANINE GREEN AND WATER 25 MG
KIT INJECTION
Status: DISCONTINUED
Start: 2024-03-08 | End: 2024-03-08 | Stop reason: WASHOUT

## 2024-03-08 RX ORDER — VANCOMYCIN HYDROCHLORIDE 500 MG/10ML
INJECTION, POWDER, LYOPHILIZED, FOR SOLUTION INTRAVENOUS
Status: DISCONTINUED
Start: 2024-03-08 | End: 2024-03-08 | Stop reason: WASHOUT

## 2024-03-08 RX ORDER — NEOMYCIN SULFATE, POLYMYXIN B SULFATE, AND DEXAMETHASONE 3.5; 10000; 1 MG/G; [USP'U]/G; MG/G
OINTMENT OPHTHALMIC
Status: DISCONTINUED
Start: 2024-03-08 | End: 2024-03-08 | Stop reason: HOSPADM

## 2024-03-08 RX ORDER — BUPIVACAINE HYDROCHLORIDE 7.5 MG/ML
INJECTION, SOLUTION EPIDURAL; RETROBULBAR
Status: DISCONTINUED | OUTPATIENT
Start: 2024-03-08 | End: 2024-03-08 | Stop reason: HOSPADM

## 2024-03-08 RX ORDER — OXYCODONE AND ACETAMINOPHEN 10; 325 MG/1; MG/1
1 TABLET ORAL
Status: COMPLETED | OUTPATIENT
Start: 2024-03-08 | End: 2024-03-08

## 2024-03-08 RX ORDER — LIDOCAINE HYDROCHLORIDE 20 MG/ML
JELLY TOPICAL
Status: DISCONTINUED
Start: 2024-03-08 | End: 2024-03-08 | Stop reason: HOSPADM

## 2024-03-08 RX ORDER — PROPARACAINE HYDROCHLORIDE 5 MG/ML
1 SOLUTION/ DROPS OPHTHALMIC
Status: DISCONTINUED | OUTPATIENT
Start: 2024-03-08 | End: 2024-03-08 | Stop reason: HOSPADM

## 2024-03-08 RX ORDER — ONDANSETRON HYDROCHLORIDE 2 MG/ML
4 INJECTION, SOLUTION INTRAVENOUS DAILY PRN
Status: DISCONTINUED | OUTPATIENT
Start: 2024-03-08 | End: 2024-03-08 | Stop reason: HOSPADM

## 2024-03-08 RX ORDER — PROPOFOL 10 MG/ML
VIAL (ML) INTRAVENOUS
Status: DISCONTINUED | OUTPATIENT
Start: 2024-03-08 | End: 2024-03-08

## 2024-03-08 RX ORDER — LIDOCAINE HYDROCHLORIDE 20 MG/ML
INJECTION, SOLUTION EPIDURAL; INFILTRATION; INTRACAUDAL; PERINEURAL
Status: DISCONTINUED | OUTPATIENT
Start: 2024-03-08 | End: 2024-03-08 | Stop reason: HOSPADM

## 2024-03-08 RX ORDER — OXYCODONE AND ACETAMINOPHEN 10; 325 MG/1; MG/1
TABLET ORAL
Status: DISCONTINUED
Start: 2024-03-08 | End: 2024-03-08 | Stop reason: HOSPADM

## 2024-03-08 RX ORDER — SODIUM CHLORIDE 9 MG/ML
INJECTION, SOLUTION INTRAVENOUS CONTINUOUS
Status: DISCONTINUED | OUTPATIENT
Start: 2024-03-08 | End: 2024-03-08 | Stop reason: HOSPADM

## 2024-03-08 RX ORDER — NEOMYCIN SULFATE, POLYMYXIN B SULFATE, AND DEXAMETHASONE 3.5; 10000; 1 MG/G; [USP'U]/G; MG/G
OINTMENT OPHTHALMIC
Status: DISCONTINUED | OUTPATIENT
Start: 2024-03-08 | End: 2024-03-08 | Stop reason: HOSPADM

## 2024-03-08 RX ORDER — EPINEPHRINE 1 MG/ML
INJECTION, SOLUTION, CONCENTRATE INTRAVENOUS
Status: DISCONTINUED
Start: 2024-03-08 | End: 2024-03-08 | Stop reason: HOSPADM

## 2024-03-08 RX ORDER — LIDOCAINE HYDROCHLORIDE 20 MG/ML
JELLY TOPICAL
Status: DISCONTINUED | OUTPATIENT
Start: 2024-03-08 | End: 2024-03-08 | Stop reason: HOSPADM

## 2024-03-08 RX ORDER — CHLORPROMAZINE HYDROCHLORIDE 25 MG/ML
INJECTION INTRAMUSCULAR
Status: DISCONTINUED | OUTPATIENT
Start: 2024-03-08 | End: 2024-03-08 | Stop reason: HOSPADM

## 2024-03-08 RX ORDER — FENTANYL CITRATE 50 UG/ML
25 INJECTION, SOLUTION INTRAMUSCULAR; INTRAVENOUS EVERY 5 MIN PRN
Status: DISCONTINUED | OUTPATIENT
Start: 2024-03-08 | End: 2024-03-08 | Stop reason: HOSPADM

## 2024-03-08 RX ORDER — TRIAMCINOLONE ACETONIDE 40 MG/ML
INJECTION, SUSPENSION INTRA-ARTICULAR; INTRAMUSCULAR
Status: DISCONTINUED
Start: 2024-03-08 | End: 2024-03-08 | Stop reason: HOSPADM

## 2024-03-08 RX ORDER — LIDOCAINE HYDROCHLORIDE 20 MG/ML
INJECTION, SOLUTION EPIDURAL; INFILTRATION; INTRACAUDAL; PERINEURAL
Status: DISCONTINUED
Start: 2024-03-08 | End: 2024-03-08 | Stop reason: HOSPADM

## 2024-03-08 RX ORDER — TRIAMCINOLONE ACETONIDE 40 MG/ML
INJECTION, SUSPENSION INTRA-ARTICULAR; INTRAMUSCULAR
Status: DISCONTINUED | OUTPATIENT
Start: 2024-03-08 | End: 2024-03-08 | Stop reason: HOSPADM

## 2024-03-08 RX ORDER — CHLORPROMAZINE HYDROCHLORIDE 25 MG/ML
25 INJECTION INTRAMUSCULAR ONCE
Status: DISCONTINUED | OUTPATIENT
Start: 2024-03-08 | End: 2024-03-08 | Stop reason: HOSPADM

## 2024-03-08 RX ORDER — MIDAZOLAM HYDROCHLORIDE 1 MG/ML
INJECTION, SOLUTION INTRAMUSCULAR; INTRAVENOUS
Status: DISCONTINUED | OUTPATIENT
Start: 2024-03-08 | End: 2024-03-08

## 2024-03-08 RX ORDER — BUPIVACAINE HYDROCHLORIDE 7.5 MG/ML
INJECTION, SOLUTION EPIDURAL; RETROBULBAR
Status: DISCONTINUED
Start: 2024-03-08 | End: 2024-03-08 | Stop reason: HOSPADM

## 2024-03-08 RX ADMIN — PROPARACAINE HYDROCHLORIDE 1 DROP: 5 SOLUTION/ DROPS OPHTHALMIC at 01:03

## 2024-03-08 RX ADMIN — SODIUM CHLORIDE: 9 INJECTION, SOLUTION INTRAVENOUS at 02:03

## 2024-03-08 RX ADMIN — PHENYLEPHRINE HYDROCHLORIDE 1 DROP: 25 SOLUTION/ DROPS OPHTHALMIC at 01:03

## 2024-03-08 RX ADMIN — PROPOFOL 20 MG: 10 INJECTION, EMULSION INTRAVENOUS at 02:03

## 2024-03-08 RX ADMIN — PROPOFOL 50 MG: 10 INJECTION, EMULSION INTRAVENOUS at 02:03

## 2024-03-08 RX ADMIN — OXYCODONE AND ACETAMINOPHEN 1 TABLET: 10; 325 TABLET ORAL at 01:03

## 2024-03-08 RX ADMIN — SODIUM CHLORIDE: 9 INJECTION, SOLUTION INTRAVENOUS at 01:03

## 2024-03-08 RX ADMIN — MIDAZOLAM HYDROCHLORIDE 3 MG: 1 INJECTION, SOLUTION INTRAMUSCULAR; INTRAVENOUS at 02:03

## 2024-03-08 NOTE — PROGRESS NOTES
Per Renetta RN - patient has not had a period in > 3 years and dr huertas notified. Dr Huertas stated that no urine or bloodwork needed to proceed.    wctm

## 2024-03-08 NOTE — TRANSFER OF CARE
"Anesthesia Transfer of Care Note    Patient: Tabby Howard    Procedure(s) Performed: Procedure(s) (LRB):  Cyclophotocoagulation G-probe, retrobulbar chlorpromazine left eye (Left)    Patient location: PACU    Anesthesia Type: MAC    Transport from OR: Transported from OR on 2-3 L/min O2 by NC with adequate spontaneous ventilation    Post pain: adequate analgesia    Post assessment: no apparent anesthetic complications    Post vital signs: stable    Level of consciousness: sedated    Nausea/Vomiting: no nausea/vomiting    Complications: none    Transfer of care protocol was followed      Last vitals: Visit Vitals  /66   Pulse 80   Temp 36.7 °C (98.1 °F) (Temporal)   Resp 18   Ht 5' 2" (1.575 m)   Wt 53.1 kg (117 lb)   LMP 02/01/2021 (Approximate)   SpO2 100%   Breastfeeding No   BMI 21.40 kg/m²     "

## 2024-03-08 NOTE — DISCHARGE SUMMARY
[facility] (1st Fl)  Discharge Note  Short Stay    Procedure(s) (LRB):  Cyclophotocoagulation G-probe, retrobulbar chlorpromazine left eye (Left)      OUTCOME: Patient tolerated treatment/procedure well without complication and is now ready for discharge.    DISPOSITION: Home or Self Care    FINAL DIAGNOSIS:  blind painful eye    FOLLOWUP: In clinic    DISCHARGE INSTRUCTIONS:  No discharge procedures on file.     TIME SPENT ON DISCHARGE: 10 minutes

## 2024-03-08 NOTE — H&P
History    Chief complaint:  Pain, blurry vision left Eye    Present Ilness/Diagnosis: blind hypertensive painful, left Eye    ROS: +Eyes, otherwise no significant changes    Past Medical History: refer to chart    Family History/Social History: refer to chart    Allergies:   Review of patient's allergies indicates:   Allergen Reactions    Penicillins Hives       Current Medications: see medcard      Physical Exam    BP: Vital signs stable  General: left eye pain  Lungs: adequate respirations  Heart: + pulses  Abdomen: soft  Rectal/pelvic: deferred    Labs: Labs Reviewed    Lab Results   Component Value Date    WBC 2.84 (L) 02/28/2024    HGB 7.9 (L) 02/28/2024    HCT 24.1 (L) 02/28/2024    MCV 91 02/28/2024    PLT 47 (L) 02/28/2024           CMP  Sodium   Date Value Ref Range Status   03/06/2024 139 136 - 145 mmol/L Final     Potassium   Date Value Ref Range Status   03/06/2024 3.7 3.5 - 5.1 mmol/L Final     Chloride   Date Value Ref Range Status   03/06/2024 98 95 - 110 mmol/L Final     CO2   Date Value Ref Range Status   03/06/2024 29 23 - 29 mmol/L Final     Glucose   Date Value Ref Range Status   03/06/2024 104 70 - 110 mg/dL Final     BUN   Date Value Ref Range Status   03/06/2024 26 (H) 6 - 20 mg/dL Final     Creatinine   Date Value Ref Range Status   03/06/2024 9.0 (H) 0.5 - 1.4 mg/dL Final     Calcium   Date Value Ref Range Status   03/06/2024 8.9 8.7 - 10.5 mg/dL Final     Total Protein   Date Value Ref Range Status   02/28/2024 6.4 6.0 - 8.4 g/dL Final     Albumin   Date Value Ref Range Status   02/28/2024 3.3 (L) 3.5 - 5.2 g/dL Final     Total Bilirubin   Date Value Ref Range Status   02/28/2024 1.6 (H) 0.1 - 1.0 mg/dL Final     Comment:     For infants and newborns, interpretation of results should be based  on gestational age, weight and in agreement with clinical  observations.    Premature Infant recommended reference ranges:  Up to 24 hours.............<8.0 mg/dL  Up to 48 hours............<12.0  mg/dL  3-5 days..................<15.0 mg/dL  6-29 days.................<15.0 mg/dL       Alkaline Phosphatase   Date Value Ref Range Status   02/28/2024 96 55 - 135 U/L Final     AST   Date Value Ref Range Status   02/28/2024 13 10 - 40 U/L Final     ALT   Date Value Ref Range Status   02/28/2024 <5 (L) 10 - 44 U/L Final     Anion Gap   Date Value Ref Range Status   03/06/2024 12 8 - 16 mmol/L Final     eGFR   Date Value Ref Range Status   03/06/2024 5.4 (A) >60 mL/min/1.73 m^2 Final           Impression: Blind painful hypertensive, left Eye    Plan: Cyclophotocoagulation, retrobulbar injection, left Eye

## 2024-03-08 NOTE — ANESTHESIA PREPROCEDURE EVALUATION
03/08/2024  Tabby Howard is a 34 y.o., female.  Pre-operative evaluation for Procedure(s) (LRB):  Cyclophotocoagulation G-probe, retrobulbar chlorpromazine left eye (Left)    Tabby Howard is a 34 y.o. female     Patient Active Problem List   Diagnosis    Gallstones    Ketoacidosis    Secondary hyperparathyroidism    Homelessness    Gastroparesis - suspected, unconfirmed    Sickle cell trait    Seizure disorder    Nephrotic range proteinuria    Pericardial effusion    Hypertensive emergency    ESRD (end stage renal disease)    Deep vein thrombosis (DVT) of non-extremity vein    Chest pain    Mild malnutrition    Symptomatic anemia    Thrombocytopenia    Vision loss, right eye    Stable proliferative diabetic retinopathy of both eyes associated with type 2 diabetes mellitus    Vitreous hemorrhage, both eyes    Cortical cataract of both eyes    Adjustment disorder    Drug-seeking behavior    Type 2 diabetes mellitus with hyperglycemia, with long-term current use of insulin, previous HbA1c 5.8%    Chronic congestive heart failure with left ventricular diastolic dysfunction    Frequent hospital admissions    Anemia in ESRD (end-stage renal disease)    Mood disorder    Displacement of vascular dialysis catheter    Hypertension    MDD (major depressive disorder)    GERD (gastroesophageal reflux disease)    Cardiac arrest    Prolonged QT interval    Eye pain, right    Cystitis    Acute glaucoma of left eye       Review of patient's allergies indicates:   Allergen Reactions    Penicillins Hives       No current facility-administered medications on file prior to encounter.     Current Outpatient Medications on File Prior to Encounter   Medication Sig Dispense Refill    amLODIPine (NORVASC) 5 MG tablet Take 1 tablet (5 mg total) by mouth once daily. 30 tablet 11    atropine 1% (ISOPTO ATROPINE) 1 % Drop Place 1 drop  into the left eye 2 (two) times a day. 15 mL 3    brimonidine 0.15 % OPTH DROP (ALPHAGAN) 0.15 % ophthalmic solution Place 1 drop into both eyes 3 (three) times daily. 15 mL 3    busPIRone (BUSPAR) 10 MG tablet Take 1 tablet (10 mg total) by mouth 3 (three) times daily as needed (anxiety). 60 tablet 5    carvediloL (COREG) 25 MG tablet Take 1 tablet twice a day by oral route for 30 days. 60 tablet 2    cetirizine (ZYRTEC) 10 MG tablet Take 1 tablet every day by oral route. 30 tablet 0    dorzolamide (TRUSOPT) 2 % ophthalmic solution Place 1 drop into both eyes 2 (two) times a day. 10 mL 3    FLUoxetine 20 MG capsule Take 1 capsule every day by oral route for 90 days. 90 capsule 1    fluticasone propionate (FLONASE ALLERGY RELIEF) 50 mcg/actuation nasal spray [place] 1 spray every day by intranasal route. 16 g 2    gabapentin (NEURONTIN) 300 MG capsule Take 2 capsules twice a day by oral route for 90 days. 360 capsule 1    isosorbide mononitrate (IMDUR) 30 MG 24 hr tablet Take 1 tablet (30 mg total) by mouth once daily. 90 tablet 1    levETIRAcetam (KEPPRA) 500 MG Tab Take 1 tablet (500 mg total) by mouth 2 (two) times daily. 60 tablet 11    methazoLAMIDE (NEPTAZANE) 50 MG Tab Take 2 tablets (100 mg total) by mouth 3 (three) times daily. 180 tablet 11    ondansetron (ZOFRAN-ODT) 4 MG TbDL Place 1 tablet (4 mg) on or under the tongue every 8 hours for 10 days. 24 tablet 2    oxyCODONE-acetaminophen (PERCOCET)  mg per tablet Take 1 tablet by mouth every 4 (four) hours as needed for Pain. 42 tablet 0    prednisoLONE acetate (PRED FORTE) 1 % DrpS Place 1 drop into the left eye 4 (four) times daily. for 10 days 15 mL 3    sevelamer carbonate (RENVELA) 800 mg Tab Take 1 tablet (800 mg total) by mouth 3 (three) times daily with meals. 180 tablet 11    sucralfate (CARAFATE) 100 mg/mL suspension Take 10 mL 4 times a day by oral route before meals for 30 days. 1200 mL 1    timolol maleate 0.5% (TIMOPTIC) 0.5 % Drop Place 1  "drop into both eyes 2 (two) times daily. 15 mL 3    apixaban (ELIQUIS) 5 mg Tab Take 1 tablet (5 mg total) by mouth 2 (two) times daily. Patient w/ thrombocytopenia <50, so held during admission, follow-up with hematologist about restarting 180 tablet 1    blood sugar diagnostic (TRUE METRIX GLUCOSE TEST STRIP) Strp Use 1 strip to check blood glucose three times daily 100 each 11    blood-glucose meter (TRUE METRIX GLUCOSE METER) Misc Use to check blood glucose 1 each 0    blood-glucose meter,continuous (DEXCOM ) Misc USE WITH SENSORS 1 each 0    blood-glucose sensor Heather CHANGE EVERY 10 DAYS 3 each 0    hydrALAZINE (APRESOLINE) 100 MG tablet Take 1 tablet (100 mg total) by mouth 2 (two) times daily. 180 tablet 1    insulin detemir U-100, Levemir, (LEVEMIR FLEXTOUCH U100 INSULIN) 100 unit/mL (3 mL) InPn pen Inject 22 Units into the skin every evening. Patient not needing insulin in-patient. Follow-up with PCP for hospital follow-up and restarting the medication. Or restart medication if glucose >200 consistently resume home insulin regimen. Or if glucose is persistently less than 100, decrease by 5 units until following up with PCP 15 mL 1    lancets (TRUEPLUS LANCETS) 33 gauge Misc Use 1 to check blood glucose three times daily 100 each 11    lancets 33 gauge Misc Use to test twice daily 100 each 5    pen needle, diabetic 31 gauge x 3/16" Ndle Use as directed to inject insulin 5 times daily 100 each 11    pen needle, diabetic 31 gauge x 3/16" Ndle Use as directed with insulin once daily 100 each 0    [DISCONTINUED] atenoloL (TENORMIN) 50 MG tablet Take 1 tablet (50 mg total) by mouth every other day. 45 tablet 3    [DISCONTINUED] omeprazole (PRILOSEC) 20 MG capsule Take 2 capsules (40 mg total) by mouth once daily. for 10 days 20 capsule 0       Past Surgical History:   Procedure Laterality Date     SECTION      x 3    COLONOSCOPY      COLONOSCOPY N/A 2022    Procedure: COLONOSCOPY;  Surgeon: " Jagdeep Cedeno MD;  Location: [facility];  Service: Endoscopy;  Laterality: N/A;    ENDOSCOPIC ULTRASOUND OF UPPER GASTROINTESTINAL TRACT N/A 12/16/2023    Procedure: ULTRASOUND, UPPER GI TRACT, ENDOSCOPIC;  Surgeon: Micky Paredes III, MD;  Location: [facility] ENDO;  Service: Endoscopy;  Laterality: N/A;    ESOPHAGOGASTRODUODENOSCOPY N/A 10/18/2019    Procedure: ESOPHAGOGASTRODUODENOSCOPY (EGD);  Surgeon: Gianluca Mendez MD;  Location: [facility];  Service: Endoscopy;  Laterality: N/A;    ESOPHAGOGASTRODUODENOSCOPY N/A 08/24/2022    Procedure: EGD (ESOPHAGOGASTRODUODENOSCOPY);  Surgeon: Micky Paredes III, MD;  Location: [facility];  Service: Endoscopy;  Laterality: N/A;    ESOPHAGOGASTRODUODENOSCOPY N/A 12/5/2022    Procedure: EGD (ESOPHAGOGASTRODUODENOSCOPY);  Surgeon: Marcelo Zhong MD;  Location: [facility] ENDO;  Service: Endoscopy;  Laterality: N/A;    INSERTION, CATHETER, TUNNELED N/A 6/17/2023    Procedure: Insertion,catheter,tunneled;  Surgeon: Carlos Thurman Jr., MD;  Location: [facility] OR;  Service: General;  Laterality: N/A;    LAPAROSCOPIC CHOLECYSTECTOMY N/A 07/30/2022    Procedure: CHOLECYSTECTOMY, LAPAROSCOPIC;  Surgeon: Grey Perez MD;  Location: [facility] OR;  Service: General;  Laterality: N/A;    PLACEMENT OF DUAL-LUMEN VASCULAR CATHETER Left 07/12/2022    Procedure: INSERTION-CATHETER-JOSEPH;  Surgeon: Dionte Gan MD;  Location: [facility] OR;  Service: General;  Laterality: Left;    PLACEMENT OF DUAL-LUMEN VASCULAR CATHETER Right 07/26/2022    Procedure: INSERTION-CATHETER-Hemosplit;  Surgeon: Dionte Gan MD;  Location: [facility] OR;  Service: General;  Laterality: Right;       Social History     Socioeconomic History    Marital status:    Tobacco Use    Smoking status: Never    Smokeless tobacco: Never   Substance and Sexual Activity    Alcohol use: Not Currently    Drug use: No    Sexual activity: Not Currently     Partners: Male     Birth control/protection: I.U.D.     Social  "Determinants of Health     Financial Resource Strain: Low Risk  (2/28/2024)    Overall Financial Resource Strain (CARDIA)     Difficulty of Paying Living Expenses: Not very hard   Food Insecurity: No Food Insecurity (2/28/2024)    Hunger Vital Sign     Worried About Running Out of Food in the Last Year: Never true     Ran Out of Food in the Last Year: Never true   Transportation Needs: No Transportation Needs (2/28/2024)    PRAPARE - Transportation     Lack of Transportation (Medical): No     Lack of Transportation (Non-Medical): No   Physical Activity: Insufficiently Active (2/28/2024)    Exercise Vital Sign     Days of Exercise per Week: 1 day     Minutes of Exercise per Session: 10 min   Stress: No Stress Concern Present (2/28/2024)    [employer] [place] [employer] - Occupational Stress Questionnaire     Feeling of Stress : Only a little   Social Connections: Moderately Isolated (2/28/2024)    Social Connection and Isolation Panel [NHANES]     Frequency of Communication with Friends and Family: More than three times a week     Frequency of Social Gatherings with Friends and Family: More than three times a week     Attends Synagogue Services: 1 to 4 times per year     Active Member of Clubs or Organizations: No     Attends Club or Organization Meetings: Never     Marital Status:    Housing Stability: Low Risk  (2/28/2024)    Housing Stability Vital Sign     Unable to Pay for Housing in the Last Year: No     Number of Places Lived in the Last Year: 2     Unstable Housing in the Last Year: No         CBC: No results for input(s): "WBC", "RBC", "HGB", "HCT", "PLT", "MCV", "MCH", "MCHC" in the last 72 hours.    CMP:   Recent Labs     03/06/24  1511      K 3.7   CL 98   CO2 29   BUN 26*   CREATININE 9.0*      CALCIUM 8.9           Pre-op Assessment    I have reviewed the Patient Summary Reports.     I have reviewed the Nursing Notes.    I have reviewed the Medications.     Review of " Systems  Anesthesia Hx:  No problems with previous Anesthesia   History of prior surgery of interest to airway management or planning:          Denies Family Hx of Anesthesia complications.    Denies Personal Hx of Anesthesia complications.                    Social:  Non-Smoker       Hematology/Oncology:  Hematology Normal   Oncology Normal                                   Cardiovascular:     Hypertension       CHF                                 Pulmonary:  Pulmonary Normal                       Renal/:  Chronic Renal Disease, Dialysis, ESRD                Hepatic/GI:     GERD             Musculoskeletal:  Musculoskeletal Normal                Neurological:       Seizures                                Endocrine:  Diabetes           Psych:  Psychiatric Normal                    Physical Exam  General: Well nourished and Cooperative    Airway:  Mallampati: II   Mouth Opening: Normal  TM Distance: Normal  Tongue: Normal  Neck ROM: Normal ROM    Dental:  Intact    Chest/Lungs:  Clear to auscultation, Normal Respiratory Rate    Heart:  Rate: Normal  Rhythm: Regular Rhythm  Sounds: Normal        Anesthesia Plan  Type of Anesthesia, risks & benefits discussed:    Anesthesia Type: Gen Natural Airway, MAC  Intra-op Monitoring Plan: Standard ASA Monitors  Post Op Pain Control Plan: multimodal analgesia  Induction:  IV  Airway Plan: , Post-Induction  Informed Consent: Informed consent signed with the Patient and all parties understand the risks and agree with anesthesia plan.  All questions answered.   ASA Score: 4  Day of Surgery Review of History & Physical: H&P Update referred to the surgeon/provider.    Ready For Surgery From Anesthesia Perspective.     .

## 2024-03-08 NOTE — DISCHARGE INSTRUCTIONS
Keep eye patch in place until tomorrow morning, then okay to remove.    No activity restrictions    Stop methazolamide    Eye drops:  Atropine 2 x day left eye  Prednisolone 2 x day left eye  Dorzolamide/timolol 2 x day left eye  Brimonidine 2 x day left eye    F/u in clinic next week

## 2024-03-08 NOTE — OP NOTE
Operative Date:  03/08/2024    Discharge Date:  03/08/2024    SURGEON:     Fidel Wise MD    ASSISTANT:   none     PREOPERATIVE DIAGNOSIS:         Neovascular glaucoma of the  left eye, severe stage; Blind painful hypertensive eye, left eye     POSTOPERATIVE DIAGNOSIS:       Same     PROCEDURE PERFORMED:          Transcleral Cyclophotocoagulation of the left eye; retrobulbar chlorpromazine injection left eye     COMPLICATIONS:     None.     ESTIMATED BLOOD LOSS:  None     ANESTHESIA:            Topical with MAC, Retrobulbar block     PROCEDURE IN DETAIL:          The patient and correct eye to be operated on were identified by the operating surgeon and surgical team and the patient was brought into the operating room.  MAC anesthesia initiated. Retrobulbar block was administered without difficulty.  Keeping the needle in place in the retrobulbar space, the 25mg of chlorpromazine was injected as well.  The patient received topical anesthesia with lidocaine jelly, a speculum was placed.       The G-probe handpiece was positioned on the eye in proper location.  Procedure was performed using 270 degrees of treatment with laser settings below, sparing the inferonasal quadrant and skipping the 9 and 3 o'clock locations.  40mg of triamcinolone was injected in the subconjunctival space.  After procedure, antibiotic/steroid ointment was applied to the eye and eye was closed using a patch.   The patient was taken to the recovery room in good and stable condition.  The patient tolerated the procedure well.       Laser settings:  Duration:  4000 ms  Treatment spots: 16  Power:  1250 mW

## 2024-03-11 LAB — POCT GLUCOSE: 109 MG/DL (ref 70–110)

## 2024-03-11 NOTE — ANESTHESIA POSTPROCEDURE EVALUATION
Anesthesia Post Evaluation    Patient: Tabby Howard    Procedure(s) Performed: Procedure(s) (LRB):  Cyclophotocoagulation G-probe, retrobulbar chlorpromazine left eye (Left)    Final Anesthesia Type: MAC      Patient location during evaluation: PACU  Patient participation: Yes- Able to Participate  Level of consciousness: awake and alert  Post-procedure vital signs: reviewed and stable  Pain management: adequate  Airway patency: patent    PONV status at discharge: No PONV  Anesthetic complications: no      Cardiovascular status: blood pressure returned to baseline  Respiratory status: unassisted, room air and spontaneous ventilation  Hydration status: euvolemic  Follow-up not needed.              Vitals Value Taken Time   /83 03/08/24 1602   Temp 36.7 °C (98.1 °F) 03/08/24 1505   Pulse 80 03/08/24 1606   Resp 20 03/08/24 1606   SpO2 99 % 03/08/24 1605   Vitals shown include unvalidated device data.      No case tracking events are documented in the log.      Pain/Lien Score: No data recorded

## 2024-03-13 ENCOUNTER — OFFICE VISIT (OUTPATIENT)
Dept: OPHTHALMOLOGY | Facility: CLINIC | Age: 34
End: 2024-03-13
Payer: MEDICAID

## 2024-03-13 DIAGNOSIS — H43.13 VITREOUS HEMORRHAGE, BOTH EYES: ICD-10-CM

## 2024-03-13 DIAGNOSIS — H40.52X3 NEOVASCULAR GLAUCOMA, LEFT EYE, SEVERE STAGE: Primary | ICD-10-CM

## 2024-03-13 DIAGNOSIS — E11.3553 STABLE PROLIFERATIVE DIABETIC RETINOPATHY OF BOTH EYES ASSOCIATED WITH TYPE 2 DIABETES MELLITUS: ICD-10-CM

## 2024-03-13 PROCEDURE — 99214 OFFICE O/P EST MOD 30 MIN: CPT | Mod: 24,S$PBB,, | Performed by: OPHTHALMOLOGY

## 2024-03-13 PROCEDURE — 1159F MED LIST DOCD IN RCRD: CPT | Mod: CPTII,,, | Performed by: OPHTHALMOLOGY

## 2024-03-13 PROCEDURE — 99999 PR PBB SHADOW E&M-EST. PATIENT-LVL III: CPT | Mod: PBBFAC,,, | Performed by: OPHTHALMOLOGY

## 2024-03-13 PROCEDURE — 1160F RVW MEDS BY RX/DR IN RCRD: CPT | Mod: CPTII,,, | Performed by: OPHTHALMOLOGY

## 2024-03-13 PROCEDURE — 99213 OFFICE O/P EST LOW 20 MIN: CPT | Mod: PBBFAC,PO | Performed by: OPHTHALMOLOGY

## 2024-03-13 PROCEDURE — 1111F DSCHRG MED/CURRENT MED MERGE: CPT | Mod: CPTII,,, | Performed by: OPHTHALMOLOGY

## 2024-03-13 PROCEDURE — 3066F NEPHROPATHY DOC TX: CPT | Mod: CPTII,,, | Performed by: OPHTHALMOLOGY

## 2024-03-13 PROCEDURE — 76512 OPH US DX B-SCAN: CPT | Mod: 50,PBBFAC,PO | Performed by: OPHTHALMOLOGY

## 2024-03-13 NOTE — PROGRESS NOTES
HPI    Pt presents for IOP check OS     States she still has intermittent pain, states when the pain comes on it   is a 10.     Dorz/remberto BID OS   Brim BID OS   PF QID OS   Atropine BID OS   Methazolamide BID PO   Last edited by Yvonne Snider on 3/13/2024  9:10 AM.         A/P    ICD-10-CM ICD-9-CM   1. Neovascular glaucoma, left eye, severe stage  H40.52X3 365.63     365.73   2. Vitreous hemorrhage, both eyes  H43.13 379.23   3. Stable proliferative diabetic retinopathy of both eyes associated with type 2 diabetes mellitus  E11.3553 250.50     362.02     1. Neovascular glaucoma, left eye, severe stage  2. Stable proliferative diabetic retinopathy of both eyes associated with type 2 diabetes mellitus  3. Vitreous hemorrhage, both eyes  Pt here for NVG/PDR f/u  Lost to f/u from my care- last seen Nov 2022 by me, seen by outside provider and had surgery for left eye earlier this year    Found to have NVG and NLP vision in ED- Recent notes reviewed  Saw Dr Wise, had CPC - Recent notes reviewed     On Dialysis, sickle trait     OD: s/p BLANCA 11/4/22  [facility] 20/100 (was LP) with improved view to posterior segment than my first exam. Still has some hazy view but no fady NV noted, has light PRP. Unclear if was getting injections at other office, but pt has appt coming up.   Plan: Recommend to keep that appt with other retina specialist as they have been managing her retina pathology, though I am happy to see the patient if she wishes to transition care.     OS: s/p BLANCA 11/4/22  [facility] NLP, significant chemosis and subconj heme with hyphema, B scan today hazy appears to have oil?  Plan: Observation for now,  will have pt see Dr Wise tomorrow as scheduled. Counseled that given NLP and painful, would likely need to have enucleation    Continue :   Dorz/remberto BID OS   Brim BID OS   PF QID OS   Atropine BID OS   Methazolamide BID PO         RTC Billie as scheduled  Me prn, continue [facility] as scheduled for OD  management        I saw and examined the patient and reviewed in detail the findings documented. The final examination findings, image interpretations, and plan as documented in the record represent my personal judgment and conclusions.    Alfonso Garay MD  Vitreoretinal Surgery   [facility]

## 2024-03-14 ENCOUNTER — OFFICE VISIT (OUTPATIENT)
Dept: OPHTHALMOLOGY | Facility: CLINIC | Age: 34
End: 2024-03-14
Payer: MEDICAID

## 2024-03-14 DIAGNOSIS — Z98.83 STATUS POST GLAUCOMA SURGERY: Primary | ICD-10-CM

## 2024-03-14 PROCEDURE — 1160F RVW MEDS BY RX/DR IN RCRD: CPT | Mod: CPTII,,, | Performed by: OPHTHALMOLOGY

## 2024-03-14 PROCEDURE — 99024 POSTOP FOLLOW-UP VISIT: CPT | Mod: ,,, | Performed by: OPHTHALMOLOGY

## 2024-03-14 PROCEDURE — 99213 OFFICE O/P EST LOW 20 MIN: CPT | Mod: PBBFAC,PO | Performed by: OPHTHALMOLOGY

## 2024-03-14 PROCEDURE — 3066F NEPHROPATHY DOC TX: CPT | Mod: CPTII,,, | Performed by: OPHTHALMOLOGY

## 2024-03-14 PROCEDURE — 1159F MED LIST DOCD IN RCRD: CPT | Mod: CPTII,,, | Performed by: OPHTHALMOLOGY

## 2024-03-14 PROCEDURE — 99999 PR PBB SHADOW E&M-EST. PATIENT-LVL III: CPT | Mod: PBBFAC,,, | Performed by: OPHTHALMOLOGY

## 2024-03-14 RX ORDER — ATROPINE SULFATE 10 MG/ML
1 SOLUTION/ DROPS OPHTHALMIC 2 TIMES DAILY
Qty: 15 ML | Refills: 3 | Status: SHIPPED | OUTPATIENT
Start: 2024-03-14

## 2024-03-14 RX ORDER — PREDNISOLONE ACETATE 10 MG/ML
1 SUSPENSION/ DROPS OPHTHALMIC 2 TIMES DAILY
Qty: 5 ML | Refills: 3 | Status: SHIPPED | OUTPATIENT
Start: 2024-03-14

## 2024-03-14 RX ORDER — DORZOLAMIDE HYDROCHLORIDE AND TIMOLOL MALEATE 20; 5 MG/ML; MG/ML
1 SOLUTION/ DROPS OPHTHALMIC EVERY 12 HOURS
Qty: 10 ML | Refills: 6 | Status: SHIPPED | OUTPATIENT
Start: 2024-03-14

## 2024-03-14 NOTE — PROGRESS NOTES
HPI    Pt presents for IOP check     States the eye is having a lot of pain today. States pain is a 10/10. Has   been using the drops, but did not use any today     Dorz/remberto bid OS  Brim bid OS  PF QID OS  Atropine BID OS  Methazolamide PO BID    Last edited by Yvonne Snider on 3/14/2024  1:05 PM.            Assessment /Plan     For exam results, see Encounter Report.    Status post glaucoma surgery      POW1 CPC/RB chlopromazine  Modest pain improvement  IOP elevated, but not severely  Expected eyelid edema    We hope for lower IOP in time and also improved pain  Can still consider enuc/alex if pain not tolerable    F/u 2 weeks, IOP check

## 2024-03-14 NOTE — PATIENT INSTRUCTIONS
EYE MEDS:    Dorzolamide/timolol (blue)  1 drop left eye 2 x day  Brimonidine (purple)  1 drop left eye 2 x day  Atropine (red)  1 drop left eye 2 x day  Prednisolone (pink)  1 drop left eye 2 x day

## 2024-03-15 ENCOUNTER — HOSPITAL ENCOUNTER (OUTPATIENT)
Dept: RADIOLOGY | Facility: HOSPITAL | Age: 34
Discharge: HOME | End: 2024-03-15
Payer: MEDICAID

## 2024-03-15 DIAGNOSIS — N64.4 MASTODYNIA: ICD-10-CM

## 2024-03-15 PROCEDURE — 77066 DX MAMMO INCL CAD BI: CPT | Mod: TC

## 2024-03-15 PROCEDURE — 77062 BREAST TOMOSYNTHESIS BI: CPT | Mod: 26,,, | Performed by: RADIOLOGY

## 2024-03-15 PROCEDURE — 76642 ULTRASOUND BREAST LIMITED: CPT | Mod: 26,LT,, | Performed by: RADIOLOGY

## 2024-03-15 PROCEDURE — 77066 DX MAMMO INCL CAD BI: CPT | Mod: 26,,, | Performed by: RADIOLOGY

## 2024-03-15 PROCEDURE — 76642 ULTRASOUND BREAST LIMITED: CPT | Mod: TC,LT

## 2024-03-30 ENCOUNTER — HOSPITAL ENCOUNTER (EMERGENCY)
Facility: HOSPITAL | Age: 34
Discharge: HOME | End: 2024-03-30
Attending: EMERGENCY MEDICINE
Payer: MEDICAID

## 2024-03-30 VITALS
SYSTOLIC BLOOD PRESSURE: 140 MMHG | RESPIRATION RATE: 16 BRPM | WEIGHT: 117 LBS | BODY MASS INDEX: 21.53 KG/M2 | TEMPERATURE: 98 F | HEIGHT: 62 IN | HEART RATE: 86 BPM | OXYGEN SATURATION: 96 % | DIASTOLIC BLOOD PRESSURE: 81 MMHG

## 2024-03-30 DIAGNOSIS — R10.9 ACUTE ABDOMINAL PAIN: ICD-10-CM

## 2024-03-30 DIAGNOSIS — R11.2 NAUSEA AND VOMITING, UNSPECIFIED VOMITING TYPE: Primary | ICD-10-CM

## 2024-03-30 LAB
ALBUMIN SERPL BCP-MCNC: 4.4 G/DL (ref 3.5–5.2)
ALP SERPL-CCNC: 71 U/L (ref 55–135)
ALT SERPL W/O P-5'-P-CCNC: 8 U/L (ref 10–44)
ANION GAP SERPL CALC-SCNC: 12 MMOL/L (ref 8–16)
AST SERPL-CCNC: 15 U/L (ref 10–40)
BASOPHILS # BLD AUTO: 0.02 K/UL (ref 0–0.2)
BASOPHILS NFR BLD: 0.5 % (ref 0–1.9)
BILIRUB SERPL-MCNC: 2.4 MG/DL (ref 0.1–1)
BUN SERPL-MCNC: 42 MG/DL (ref 6–20)
CALCIUM SERPL-MCNC: 9.5 MG/DL (ref 8.7–10.5)
CHLORIDE SERPL-SCNC: 100 MMOL/L (ref 95–110)
CO2 SERPL-SCNC: 29 MMOL/L (ref 23–29)
CREAT SERPL-MCNC: 5.8 MG/DL (ref 0.5–1.4)
DIFFERENTIAL METHOD BLD: ABNORMAL
EOSINOPHIL # BLD AUTO: 0.1 K/UL (ref 0–0.5)
EOSINOPHIL NFR BLD: 1.6 % (ref 0–8)
ERYTHROCYTE [DISTWIDTH] IN BLOOD BY AUTOMATED COUNT: 13.5 % (ref 11.5–14.5)
EST. GFR  (NO RACE VARIABLE): 9.1 ML/MIN/1.73 M^2
GLUCOSE SERPL-MCNC: 94 MG/DL (ref 70–110)
HCT VFR BLD AUTO: 22.6 % (ref 37–48.5)
HGB BLD-MCNC: 7.9 G/DL (ref 12–16)
IMM GRANULOCYTES # BLD AUTO: 0.02 K/UL (ref 0–0.04)
IMM GRANULOCYTES NFR BLD AUTO: 0.5 % (ref 0–0.5)
LIPASE SERPL-CCNC: 14 U/L (ref 4–60)
LYMPHOCYTES # BLD AUTO: 0.4 K/UL (ref 1–4.8)
LYMPHOCYTES NFR BLD: 10 % (ref 18–48)
MCH RBC QN AUTO: 30.4 PG (ref 27–31)
MCHC RBC AUTO-ENTMCNC: 35 G/DL (ref 32–36)
MCV RBC AUTO: 87 FL (ref 82–98)
MONOCYTES # BLD AUTO: 0.3 K/UL (ref 0.3–1)
MONOCYTES NFR BLD: 7.5 % (ref 4–15)
NEUTROPHILS # BLD AUTO: 3.5 K/UL (ref 1.8–7.7)
NEUTROPHILS NFR BLD: 79.9 % (ref 38–73)
NRBC BLD-RTO: 0 /100 WBC
PLATELET # BLD AUTO: 42 K/UL (ref 150–450)
PLATELET BLD QL SMEAR: ABNORMAL
PMV BLD AUTO: 10.4 FL (ref 9.2–12.9)
POTASSIUM SERPL-SCNC: 4.1 MMOL/L (ref 3.5–5.1)
PROT SERPL-MCNC: 7.1 G/DL (ref 6–8.4)
RBC # BLD AUTO: 2.6 M/UL (ref 4–5.4)
SODIUM SERPL-SCNC: 141 MMOL/L (ref 136–145)
WBC # BLD AUTO: 4.42 K/UL (ref 3.9–12.7)

## 2024-03-30 PROCEDURE — 90935 HEMODIALYSIS ONE EVALUATION: CPT

## 2024-03-30 PROCEDURE — 85025 COMPLETE CBC W/AUTO DIFF WBC: CPT | Performed by: EMERGENCY MEDICINE

## 2024-03-30 PROCEDURE — 96375 TX/PRO/DX INJ NEW DRUG ADDON: CPT

## 2024-03-30 PROCEDURE — 93010 ELECTROCARDIOGRAM REPORT: CPT | Mod: ,,, | Performed by: GENERAL PRACTICE

## 2024-03-30 PROCEDURE — 80053 COMPREHEN METABOLIC PANEL: CPT | Performed by: EMERGENCY MEDICINE

## 2024-03-30 PROCEDURE — 83690 ASSAY OF LIPASE: CPT | Performed by: EMERGENCY MEDICINE

## 2024-03-30 PROCEDURE — 96374 THER/PROPH/DIAG INJ IV PUSH: CPT

## 2024-03-30 PROCEDURE — 63600175 PHARM REV CODE 636 W HCPCS: Performed by: EMERGENCY MEDICINE

## 2024-03-30 PROCEDURE — 99284 EMERGENCY DEPT VISIT MOD MDM: CPT | Mod: 25

## 2024-03-30 PROCEDURE — 93005 ELECTROCARDIOGRAM TRACING: CPT | Performed by: GENERAL PRACTICE

## 2024-03-30 RX ORDER — HYDROMORPHONE HYDROCHLORIDE 1 MG/ML
0.5 INJECTION, SOLUTION INTRAMUSCULAR; INTRAVENOUS; SUBCUTANEOUS
Status: DISCONTINUED | OUTPATIENT
Start: 2024-03-30 | End: 2024-03-30

## 2024-03-30 RX ORDER — DIPHENHYDRAMINE HYDROCHLORIDE 50 MG/ML
25 INJECTION INTRAMUSCULAR; INTRAVENOUS
Status: COMPLETED | OUTPATIENT
Start: 2024-03-30 | End: 2024-03-30

## 2024-03-30 RX ORDER — DROPERIDOL 2.5 MG/ML
1.25 INJECTION, SOLUTION INTRAMUSCULAR; INTRAVENOUS ONCE
Status: COMPLETED | OUTPATIENT
Start: 2024-03-30 | End: 2024-03-30

## 2024-03-30 RX ADMIN — DROPERIDOL 1.25 MG: 2.5 INJECTION, SOLUTION INTRAMUSCULAR; INTRAVENOUS at 02:03

## 2024-03-30 RX ADMIN — DIPHENHYDRAMINE HYDROCHLORIDE 25 MG: 50 INJECTION INTRAMUSCULAR; INTRAVENOUS at 02:03

## 2024-03-30 NOTE — PROGRESS NOTES
03/30/24 1820   Vital Signs   Temp 98 °F (36.7 °C)   Pulse 83   Resp 18   SpO2 99 %   BP (!) 160/80   Post-Hemodialysis Assessment   Rinseback Volume (mL) 250 mL   Blood Volume Processed (Liters) 47.1 L   Dialyzer Clearance Clear   Duration of Treatment 180 minutes   Total UF (mL) 2500 mL   Net Fluid Removal 2000   Patient Response to Treatment tolerated well   Post-Treatment Weight 51.2 kg (112 lb 14 oz)   Treatment Weight Change -1.9   Post-Hemodialysis Comments no problems     Pt educated on fluid intake. Report given to Wilton LINTON

## 2024-03-30 NOTE — ED PROVIDER NOTES
Encounter Date: 3/30/2024       History     Chief Complaint   Patient presents with    Abdominal Pain     Patient arrives via EMS from [facility] dialysis for ABD pain, 400ml removed, patient was unable to finish due to pain,      34-year-old female with history of ESRD on dialysis, sent from her dialysis session for further treatment of abdominal pain, nausea, vomiting.  This is a recurrent problem for the patient.  Symptoms likely secondary to gastroparesis.  She has presented to the ED multiple times with similar symptoms.  She did not complete her dialysis session because of her severe symptoms.  She is writhing in bed, moaning, dry heaving, says her pain is severe.        Review of patient's allergies indicates:   Allergen Reactions    Penicillins Hives     Past Medical History:   Diagnosis Date    ESRD on hemodialysis 2022    Gastritis 2022    EGD was 22    Gastroparesis 2022    has not had Emptying study    Heart failure with preserved ejection fraction 2022    EF 55% on 3/22    History of supraventricular tachycardia     Hyperkalemia 2022    Hypertensive emergency 2022    Sickle cell trait 2022    Type 2 diabetes mellitus      Past Surgical History:   Procedure Laterality Date     SECTION      x 3    COLONOSCOPY      COLONOSCOPY N/A 2022    Procedure: COLONOSCOPY;  Surgeon: Jagdeep Cedeno MD;  Location: [facility];  Service: Endoscopy;  Laterality: N/A;    DESTRUCTION, CILIARY BODY, USING LASER Left 3/8/2024    Procedure: Cyclophotocoagulation G-probe, retrobulbar chlorpromazine left eye;  Surgeon: Fidel Wise MD;  Location: [address];  Service: Ophthalmology;  Laterality: Left;    ENDOSCOPIC ULTRASOUND OF UPPER GASTROINTESTINAL TRACT N/A 2023    Procedure: ULTRASOUND, UPPER GI TRACT, ENDOSCOPIC;  Surgeon: Micky Paredes III, MD;  Location: [facility] ENDO;  Service: Endoscopy;  Laterality: N/A;    ESOPHAGOGASTRODUODENOSCOPY N/A 10/18/2019     Procedure: ESOPHAGOGASTRODUODENOSCOPY (EGD);  Surgeon: Gianluca Mendez MD;  Location: [facility] ENDO;  Service: Endoscopy;  Laterality: N/A;    ESOPHAGOGASTRODUODENOSCOPY N/A 08/24/2022    Procedure: EGD (ESOPHAGOGASTRODUODENOSCOPY);  Surgeon: Micky Paredes III, MD;  Location: [facility] ENDO;  Service: Endoscopy;  Laterality: N/A;    ESOPHAGOGASTRODUODENOSCOPY N/A 12/5/2022    Procedure: EGD (ESOPHAGOGASTRODUODENOSCOPY);  Surgeon: Marcelo Zhong MD;  Location: [facility] ENDO;  Service: Endoscopy;  Laterality: N/A;    INSERTION, CATHETER, TUNNELED N/A 6/17/2023    Procedure: Insertion,catheter,tunneled;  Surgeon: Carlos Thurman Jr., MD;  Location: [facility] OR;  Service: General;  Laterality: N/A;    LAPAROSCOPIC CHOLECYSTECTOMY N/A 07/30/2022    Procedure: CHOLECYSTECTOMY, LAPAROSCOPIC;  Surgeon: Grey Perez MD;  Location: [facility] OR;  Service: General;  Laterality: N/A;    PLACEMENT OF DUAL-LUMEN VASCULAR CATHETER Left 07/12/2022    Procedure: INSERTION-CATHETER-JOSEPH;  Surgeon: Dionte Gan MD;  Location: [facility] OR;  Service: General;  Laterality: Left;    PLACEMENT OF DUAL-LUMEN VASCULAR CATHETER Right 07/26/2022    Procedure: INSERTION-CATHETER-Hemosplit;  Surgeon: Dionte Gan MD;  Location: [facility] OR;  Service: General;  Laterality: Right;     Family History   Problem Relation Age of Onset    Diabetes Mother     Diabetes Father      Social History     Tobacco Use    Smoking status: Never    Smokeless tobacco: Never   Substance Use Topics    Alcohol use: Not Currently    Drug use: No     Review of Systems   Gastrointestinal:  Positive for abdominal pain, nausea and vomiting.   All other systems reviewed and are negative.      Physical Exam     Initial Vitals [03/30/24 1255]   BP Pulse Resp Temp SpO2   (!) 140/80 96 20 97.6 °F (36.4 °C) 98 %      MAP       --         Physical Exam    Nursing note and vitals reviewed.  Constitutional: She appears well-developed and well-nourished. She is not diaphoretic. No  distress.   HENT:   Head: Normocephalic.   Eyes: Conjunctivae are normal.   Neck: Neck supple.   Normal range of motion.  Cardiovascular:  Normal rate.           Pulmonary/Chest: No respiratory distress.   Abdominal: She exhibits no distension. There is abdominal tenderness.   Abdomen is soft with generalized tenderness, no rebound or guarding   Musculoskeletal:         General: No edema.      Cervical back: Normal range of motion and neck supple.     Neurological: She is alert. She has normal strength.   Skin: Skin is warm and dry.   Psychiatric: She has a normal mood and affect.         ED Course   Procedures  Labs Reviewed   CBC W/ AUTO DIFFERENTIAL - Abnormal; Notable for the following components:       Result Value    RBC 2.60 (*)     Hemoglobin 7.9 (*)     Hematocrit 22.6 (*)     Platelets 42 (*)     Lymph # 0.4 (*)     Gran % 79.9 (*)     Lymph % 10.0 (*)     Platelet Estimate Decreased (*)     All other components within normal limits    Narrative:     plt   critical result(s) called and verbal readback obtained from   edie lizarraga rn by CAF 03/30/2024 15:28   COMPREHENSIVE METABOLIC PANEL - Abnormal; Notable for the following components:    BUN 42 (*)     Creatinine 5.8 (*)     Total Bilirubin 2.4 (*)     ALT 8 (*)     eGFR 9.1 (*)     All other components within normal limits   LIPASE   URINALYSIS, REFLEX TO URINE CULTURE        ECG Results              EKG 12-lead (In process)        Collection Time Result Time QRS Duration OHS QTC Calculation    03/30/24 15:27:06 03/30/24 15:38:44 84 516                     In process by Interface, Lab In [facility] (03/30/24 15:38:56)                   Narrative:    Test Reason : R10.9,    Vent. Rate : 086 BPM     Atrial Rate : 086 BPM     P-R Int : 182 ms          QRS Dur : 084 ms      QT Int : 432 ms       P-R-T Axes : 066 -01 060 degrees     QTc Int : 516 ms    Normal sinus rhythm  Prolonged QT  Abnormal ECG  When compared with ECG of 24-FEB-2024 18:29,  The axis  Shifted right    Referred By: [email or web address]   SELF           Confirmed By:                       In process by Interface, Lab In [facility] (03/30/24 15:38:36)                   Narrative:    Test Reason : R10.9,    Vent. Rate : 086 BPM     Atrial Rate : 086 BPM     P-R Int : 182 ms          QRS Dur : 084 ms      QT Int : 432 ms       P-R-T Axes : 066 -01 060 degrees     QTc Int : 516 ms    Normal sinus rhythm  Prolonged QT  Abnormal ECG  When compared with ECG of 24-FEB-2024 18:29,  The axis Shifted right    Referred By: [email or web address]   SELF           Confirmed By:                       In process by Interface, Lab In [facility] (03/30/24 15:33:58)                   Narrative:    Test Reason : R10.9,    Vent. Rate : 086 BPM     Atrial Rate : 086 BPM     P-R Int : 182 ms          QRS Dur : 084 ms      QT Int : 432 ms       P-R-T Axes : 066 -01 060 degrees     QTc Int : 516 ms    Normal sinus rhythm  Prolonged QT  Abnormal ECG  When compared with ECG of 24-FEB-2024 18:29,  The axis Shifted right    Referred By: [email or web address]   SELF           Confirmed By:                                   Imaging Results    None          Medications   droPERidol injection 1.25 mg (1.25 mg Intravenous Given 3/30/24 1422)   diphenhydrAMINE injection 25 mg (25 mg Intravenous Given 3/30/24 1421)     Medical Decision Making  Abdominal exam is soft and benign.  No leukocytosis.  Anemia is at baseline.  Electrolytes, renal function, LFTs, lipase unremarkable.  Patient was treated with IV droperidol and Benadryl with improvement in her symptoms.  We did arrange for completion of her dialysis session from the ED. appropriate for discharge home.    Amount and/or Complexity of Data Reviewed  Labs: ordered.    Risk  Prescription drug management.                                      Clinical Impression:  Final diagnoses:  [R10.9] Acute abdominal pain  [R11.2] Nausea and vomiting, unspecified vomiting type (Primary)          ED Disposition Condition    Discharge  Stable          ED Prescriptions    None       Follow-up Information       Follow up With Specialties Details Why Contact Info    Melony Kim, NP Nurse Practitioner   [address] [place] [address] [id number] [phone]               Valentino Gomez MD  03/30/24 [id number]

## 2024-03-30 NOTE — DISCHARGE INSTRUCTIONS
Return to the ER for worsening symptoms or for any other concerns.  Follow up with your PCP for re-evaluation.

## 2024-03-30 NOTE — CONSULTS
Nephrology Consult Note        Patient Name: Tabby Howard  MRN: [id number]    Patient Class: Emergency   Admission Date: 3/30/2024  Length of Stay: 0 days  Date of Service: 3/30/2024    Attending Physician: Valentino Gomez MD  Primary Care Provider: Melony Kim NP    Reason for Consult: esrd    SUBJECTIVE:     HPI: 33F ESRD on HD TTS by Dr. Figueroa, known gastroparesis and chronic pain, sickle cell trait, hypertension, pericardial effusion, type 2 diabetes was seen today at the [facility] outpatient HD unit and was able to tolerate only 50 min of dialysis due to severe abdominal pain and nausea. She was 3L positive, but only 400cc UF were possible.     EMS was called and she was transferred to [facility] for evaluation.    Will attempt to complete her HD therapy today as tolerated...     Past Medical History:   Diagnosis Date    ESRD on hemodialysis 2022    Gastritis 2022    EGD was 22    Gastroparesis 2022    has not had Emptying study    Heart failure with preserved ejection fraction 2022    EF 55% on 3/22    History of supraventricular tachycardia     Hyperkalemia 2022    Hypertensive emergency 2022    Sickle cell trait 2022    Type 2 diabetes mellitus      Past Surgical History:   Procedure Laterality Date     SECTION      x 3    COLONOSCOPY      COLONOSCOPY N/A 2022    Procedure: COLONOSCOPY;  Surgeon: Jagdeep Cedeno MD;  Location: [facility];  Service: Endoscopy;  Laterality: N/A;    DESTRUCTION, CILIARY BODY, USING LASER Left 3/8/2024    Procedure: Cyclophotocoagulation G-probe, retrobulbar chlorpromazine left eye;  Surgeon: Fidel Wise MD;  Location: [address];  Service: Ophthalmology;  Laterality: Left;    ENDOSCOPIC ULTRASOUND OF UPPER GASTROINTESTINAL TRACT N/A 2023    Procedure: ULTRASOUND, UPPER GI TRACT, ENDOSCOPIC;  Surgeon: Micky Paredes III, MD;  Location: [facility];  Service: Endoscopy;  Laterality: N/A;     ESOPHAGOGASTRODUODENOSCOPY N/A 10/18/2019    Procedure: ESOPHAGOGASTRODUODENOSCOPY (EGD);  Surgeon: Gianluca Mendez MD;  Location: [facility] ENDO;  Service: Endoscopy;  Laterality: N/A;    ESOPHAGOGASTRODUODENOSCOPY N/A 08/24/2022    Procedure: EGD (ESOPHAGOGASTRODUODENOSCOPY);  Surgeon: Micky Paredes III, MD;  Location: [facility] ENDO;  Service: Endoscopy;  Laterality: N/A;    ESOPHAGOGASTRODUODENOSCOPY N/A 12/5/2022    Procedure: EGD (ESOPHAGOGASTRODUODENOSCOPY);  Surgeon: Marcelo Zhong MD;  Location: [facility] ENDO;  Service: Endoscopy;  Laterality: N/A;    INSERTION, CATHETER, TUNNELED N/A 6/17/2023    Procedure: Insertion,catheter,tunneled;  Surgeon: Carlos Thurman Jr., MD;  Location: [facility] OR;  Service: General;  Laterality: N/A;    LAPAROSCOPIC CHOLECYSTECTOMY N/A 07/30/2022    Procedure: CHOLECYSTECTOMY, LAPAROSCOPIC;  Surgeon: Grey Perez MD;  Location: [facility] OR;  Service: General;  Laterality: N/A;    PLACEMENT OF DUAL-LUMEN VASCULAR CATHETER Left 07/12/2022    Procedure: INSERTION-CATHETER-JOSEPH;  Surgeon: Dionte Gan MD;  Location: [facility] OR;  Service: General;  Laterality: Left;    PLACEMENT OF DUAL-LUMEN VASCULAR CATHETER Right 07/26/2022    Procedure: INSERTION-CATHETER-Hemosplit;  Surgeon: Dionte Gan MD;  Location: [facility] OR;  Service: General;  Laterality: Right;     Family History   Problem Relation Age of Onset    Diabetes Mother     Diabetes Father      Social History     Tobacco Use    Smoking status: Never    Smokeless tobacco: Never   Substance Use Topics    Alcohol use: Not Currently    Drug use: No       Review of patient's allergies indicates:   Allergen Reactions    Penicillins Hives       Outpatient meds:  No current facility-administered medications on file prior to encounter.     Current Outpatient Medications on File Prior to Encounter   Medication Sig Dispense Refill    amLODIPine (NORVASC) 5 MG tablet Take 1 tablet (5 mg total) by mouth once daily. 30 tablet 11    apixaban  (ELIQUIS) 5 mg Tab Take 1 tablet (5 mg total) by mouth 2 (two) times daily. Patient w/ thrombocytopenia <50, so held during admission, follow-up with hematologist about restarting 180 tablet 1    atropine 1% (ISOPTO ATROPINE) 1 % Drop Place 1 drop into the left eye 2 (two) times a day. 15 mL 3    blood sugar diagnostic (TRUE METRIX GLUCOSE TEST STRIP) Strp Use 1 strip to check blood glucose three times daily 100 each 11    blood-glucose meter (TRUE METRIX GLUCOSE METER) Misc Use to check blood glucose 1 each 0    blood-glucose meter,continuous (DEXCOM ) Misc USE WITH SENSORS 1 each 0    blood-glucose sensor Heather CHANGE EVERY 10 DAYS 3 each 0    brimonidine 0.15 % OPTH DROP (ALPHAGAN) 0.15 % ophthalmic solution Place 1 drop into both eyes 3 (three) times daily. 15 mL 3    busPIRone (BUSPAR) 10 MG tablet Take 1 tablet (10 mg total) by mouth 3 (three) times daily as needed (anxiety). 60 tablet 5    carvediloL (COREG) 25 MG tablet Take 1 tablet twice a day by oral route for 30 days. 60 tablet 2    cetirizine (ZYRTEC) 10 MG tablet Take 1 tablet every day by oral route. 30 tablet 0    dorzolamide-timolol 2-0.5% (COSOPT) 22.3-6.8 mg/mL ophthalmic solution Place 1 drop into the left eye every 12 (twelve) hours. 10 mL 6    FLUoxetine 20 MG capsule Take 1 capsule every day by oral route for 90 days. 90 capsule 1    fluticasone propionate (FLONASE ALLERGY RELIEF) 50 mcg/actuation nasal spray [place] 1 spray every day by intranasal route. 16 g 2    gabapentin (NEURONTIN) 300 MG capsule Take 2 capsules twice a day by oral route for 90 days. 360 capsule 1    hydrALAZINE (APRESOLINE) 100 MG tablet Take 1 tablet (100 mg total) by mouth 2 (two) times daily. 180 tablet 1    insulin detemir U-100, Levemir, (LEVEMIR FLEXTOUCH U100 INSULIN) 100 unit/mL (3 mL) InPn pen Inject 22 Units into the skin every evening. Patient not needing insulin in-patient. Follow-up with PCP for hospital follow-up and restarting the medication. Or  "restart medication if glucose >200 consistently resume home insulin regimen. Or if glucose is persistently less than 100, decrease by 5 units until following up with PCP 15 mL 1    isosorbide mononitrate (IMDUR) 30 MG 24 hr tablet Take 1 tablet (30 mg total) by mouth once daily. 90 tablet 1    lancets (TRUEPLUS LANCETS) 33 gauge Misc Use 1 to check blood glucose three times daily 100 each 11    lancets 33 gauge Misc Use to test twice daily 100 each 5    levETIRAcetam (KEPPRA) 500 MG Tab Take 1 tablet (500 mg total) by mouth 2 (two) times daily. 60 tablet 11    ondansetron (ZOFRAN-ODT) 4 MG TbDL Place 1 tablet (4 mg) on or under the tongue every 8 hours for 10 days. 24 tablet 2    oxyCODONE-acetaminophen (PERCOCET)  mg per tablet Take 1 tablet by mouth every 4 (four) hours as needed for Pain. 42 tablet 0    pen needle, diabetic 31 gauge x 3/16" Ndle Use as directed to inject insulin 5 times daily 100 each 11    pen needle, diabetic 31 gauge x 3/16" Ndle Use as directed with insulin once daily 100 each 0    prednisoLONE acetate (PRED FORTE) 1 % DrpS Place 1 drop into the left eye 2 (two) times daily. 5 mL 3    sevelamer carbonate (RENVELA) 800 mg Tab Take 1 tablet (800 mg total) by mouth 3 (three) times daily with meals. 180 tablet 11    sucralfate (CARAFATE) 100 mg/mL suspension Take 10 mL 4 times a day by oral route before meals for 30 days. 1200 mL 1    [DISCONTINUED] atenoloL (TENORMIN) 50 MG tablet Take 1 tablet (50 mg total) by mouth every other day. 45 tablet 3    [DISCONTINUED] omeprazole (PRILOSEC) 20 MG capsule Take 2 capsules (40 mg total) by mouth once daily. for 10 days 20 capsule 0       Scheduled meds:      Infusions:      PRN meds:      Review of Systems:  Unable to obtain due to distress.    OBJECTIVE:     Vital Signs and IO:  Temp:  [97.6 °F (36.4 °C)]   Pulse:  [96]   Resp:  [20]   BP: (140)/(80)   SpO2:  [98 %]   No intake/output data recorded.  Wt Readings from Last 5 Encounters:   03/30/24 " "53.1 kg (117 lb)   03/08/24 53.1 kg (117 lb)   03/06/24 52.6 kg (116 lb)   02/28/24 52.7 kg (116 lb 2.9 oz)   02/07/24 56.7 kg (125 lb)     Body mass index is 21.4 kg/m².    Physical Exam  Constitutional:       General: Patient is in acute distress due to abdominal pain.     Appearance: Patient is well-developed. She is not diaphoretic.   HENT:      Head: Normocephalic and atraumatic.      Mouth/Throat: Mucous membranes are moist.   Eyes:      General: No scleral icterus.     Pupils: Pupils are equal, round, and reactive to light.   Cardiovascular:      Rate and Rhythm: Normal rate and regular rhythm.   Pulmonary:      Effort: Pulmonary effort is normal. No respiratory distress.      Breath sounds: No stridor.   Abdominal:      General: There is no distension.      Palpations: Abdomen is soft.   Musculoskeletal:         General: No deformity. Normal range of motion.      Cervical back: Neck supple.   Skin:     General: Skin is warm and dry.      Findings: No rash present. No erythema.   Neurological:      Mental Status: Patient is alert and oriented to person, place, and time.      Cranial Nerves: No cranial nerve deficit.   Psychiatric:         Behavior: Behavior normal.     Laboratory:  Recent Labs   Lab 03/30/24  1412      K 4.1      CO2 29   BUN 42*   CREATININE 5.8*   GLU 94       Recent Labs   Lab 03/30/24  1412   CALCIUM 9.5   ALBUMIN 4.4       Recent Labs   Lab 07/08/22  0516   PTH, Intact 289.8 H       No results for input(s): "POCTGLUCOSE" in the last 168 hours.    Recent Labs   Lab 12/23/22  1413 03/03/23  0547 08/01/23  0813   Hemoglobin A1C 7.5 H 5.8 5.8       Recent Labs   Lab 03/30/24  1412   WBC 4.42   HGB 7.9*   HCT 22.6*   PLT 42*   MCV 87   MCHC 35.0   MONO 7.5  0.3   EOSINOPHIL 1.6       Recent Labs   Lab 03/30/24  1412   BILITOT 2.4*   PROT 7.1   ALBUMIN 4.4   ALKPHOS 71   ALT 8*   AST 15       Recent Labs   Lab 03/16/22  1848 05/15/22  2022 02/05/23  0156 09/19/23  1422 " 10/13/23  1001   Color, UA Pale Yellow   < > Yellow Yellow Yellow   Appearance, UA  --    < > Clear Clear Clear   pH, UA  --    < > >8.0 A >8.0 A 8.0   Specific Gravity, UA  --    < > 1.020 1.020 1.015   Protein, UA  --    < > 3+ A 4+ 4+   Glucose, UA 50 mg/dL A   < > 2+ A 4+ A 3+ A   Ketones, UA  --    < > 1+ A Trace A Trace A   Urobilinogen, UA Normal   < > Negative Negative Negative   Bilirubin (UA) Negative   < > 2+ A Negative 1+ A   Occult Blood UA  --    < > 3+ A 2+ A Negative   Blood, UA 0.03 mg/dL A   < >  --   --   --    Nitrite, UA  --    < > Negative Negative Negative   RBC, UA  --    < > 15 H 17 H 8 H   WBC, UA  --    < > 1 2 5   Bacteria  --    < > Rare Negative Negative   Mucus, UA Rare A  --   --   --   --    Hyaline Casts, UA  --    < > 2 A 1 2 A    < > = values in this interval not displayed.       Recent Labs   Lab 10/03/23  2105 10/04/23  0438 10/15/23  0353 02/24/24  1855   POC PH 7.290 LL 7.305 L 7.239 LL  --    POC PCO2 45.2 H 43.6 37.8  --    POC HCO3 21.7 L 21.7 L 16.1 L  --    POC PO2 110 H 92 37 LL  --    POC SATURATED O2 98 96 60  --    POC BE -5 -5 -11  --    Sample ARTERIAL ARTERIAL VENOUS PIETER       Microbiology Results (last 7 days)       ** No results found for the last 168 hours. **            ASSESSMENT/PLAN:     ESRD on HD TTS via AVF  Next HD per schedule.  Continue current dialysis prescription.  Renal diet - low K, low phos.  No IVs or BP checks on access and/or non-dominant arm.    Anemia of CKD  Hgb and HCT are acceptable. Monitor for now.  Will provide SHELLEY and/or IV iron PRN.    MBD / Secondary HPT  Ca, Phos, PTH and vitamin D levels are acceptable.   Phos binders, vitamin D and analogues, calcimimetics will be given as needed.    HTN  BP seem controlled.   Tolerate asymptomatic HTN up to -160.  Continue home meds.  Low sodium diet.    Abdominal pain  Diabetic gastroparesis  Plan per primary team.    Thank you for allowing us to participate in the care of your patient!    We will follow the patient and provide recommendations as needed.    Patient care time was spent personally by me on the following activities:     Obtaining a history.  Examination of patient.  Providing medical care at the patients bedside.  Developing a treatment plan with patient or surrogate and bedside caregivers.  Ordering and reviewing laboratory studies, radiographic studies, pulse oximetry.  Ordering and performing treatments and interventions.  Evaluation of patient's response to treatment.  Discussions with consultants while on the unit and immediately available to the patient.  Re-evaluation of the patient's condition.  Documentation in the medical record.     Mando Benitez MD    [facility]  [address] [place] [id number]    [phone] - tel  [phone] - fax    3/30/2024

## 2024-03-31 LAB
OHS QRS DURATION: 84 MS
OHS QTC CALCULATION: 516 MS

## 2024-05-11 ENCOUNTER — HOSPITAL ENCOUNTER (OUTPATIENT)
Facility: HOSPITAL | Age: 34
Discharge: HOME | DRG: 682 | End: 2024-05-13
Attending: EMERGENCY MEDICINE | Admitting: HOSPITALIST
Payer: MEDICAID

## 2024-05-11 DIAGNOSIS — R11.10 VOMITING, UNSPECIFIED VOMITING TYPE, UNSPECIFIED WHETHER NAUSEA PRESENT: ICD-10-CM

## 2024-05-11 DIAGNOSIS — Z91.158 DIALYSIS PATIENT, NONCOMPLIANT: ICD-10-CM

## 2024-05-11 DIAGNOSIS — N18.6 ESRD (END STAGE RENAL DISEASE): Primary | ICD-10-CM

## 2024-05-11 DIAGNOSIS — R10.12 LUQ ABDOMINAL PAIN: ICD-10-CM

## 2024-05-11 DIAGNOSIS — D64.9 ANEMIA, UNSPECIFIED TYPE: ICD-10-CM

## 2024-05-11 DIAGNOSIS — E87.5 HYPERKALEMIA: ICD-10-CM

## 2024-05-11 PROBLEM — E11.9 DIABETES: Status: ACTIVE | Noted: 2022-04-12

## 2024-05-11 LAB
ABO + RH BLD: NORMAL
ALBUMIN SERPL BCP-MCNC: 3.7 G/DL (ref 3.5–5.2)
ALP SERPL-CCNC: 227 U/L (ref 55–135)
ALT SERPL W/O P-5'-P-CCNC: 69 U/L (ref 10–44)
ANION GAP SERPL CALC-SCNC: 24 MMOL/L (ref 8–16)
ANISOCYTOSIS BLD QL SMEAR: SLIGHT
AST SERPL-CCNC: 56 U/L (ref 10–40)
BASOPHILS # BLD AUTO: 0.03 K/UL (ref 0–0.2)
BASOPHILS # BLD AUTO: 0.05 K/UL (ref 0–0.2)
BASOPHILS NFR BLD: 0.3 % (ref 0–1.9)
BASOPHILS NFR BLD: 0.6 % (ref 0–1.9)
BILIRUB SERPL-MCNC: 2.6 MG/DL (ref 0.1–1)
BLD GP AB SCN CELLS X3 SERPL QL: NORMAL
BLD PROD TYP BPU: NORMAL
BLD PROD TYP BPU: NORMAL
BLOOD UNIT EXPIRATION DATE: NORMAL
BLOOD UNIT EXPIRATION DATE: NORMAL
BLOOD UNIT TYPE CODE: 6200
BLOOD UNIT TYPE CODE: 6200
BLOOD UNIT TYPE: NORMAL
BLOOD UNIT TYPE: NORMAL
BUN SERPL-MCNC: 94 MG/DL (ref 6–20)
CALCIUM SERPL-MCNC: 7.4 MG/DL (ref 8.7–10.5)
CHLORIDE SERPL-SCNC: 100 MMOL/L (ref 95–110)
CO2 SERPL-SCNC: 17 MMOL/L (ref 23–29)
CODING SYSTEM: NORMAL
CODING SYSTEM: NORMAL
CREAT SERPL-MCNC: 9.5 MG/DL (ref 0.5–1.4)
CROSSMATCH INTERPRETATION: NORMAL
CROSSMATCH INTERPRETATION: NORMAL
DIFFERENTIAL METHOD BLD: ABNORMAL
DIFFERENTIAL METHOD BLD: ABNORMAL
DISPENSE STATUS: NORMAL
DISPENSE STATUS: NORMAL
EOSINOPHIL # BLD AUTO: 0 K/UL (ref 0–0.5)
EOSINOPHIL # BLD AUTO: 0.1 K/UL (ref 0–0.5)
EOSINOPHIL NFR BLD: 0.1 % (ref 0–8)
EOSINOPHIL NFR BLD: 1 % (ref 0–8)
ERYTHROCYTE [DISTWIDTH] IN BLOOD BY AUTOMATED COUNT: 17.3 % (ref 11.5–14.5)
ERYTHROCYTE [DISTWIDTH] IN BLOOD BY AUTOMATED COUNT: 17.5 % (ref 11.5–14.5)
EST. GFR  (NO RACE VARIABLE): 5 ML/MIN/1.73 M^2
GLUCOSE SERPL-MCNC: 230 MG/DL (ref 70–110)
HCT VFR BLD AUTO: 15.8 % (ref 37–48.5)
HCT VFR BLD AUTO: 27 % (ref 37–48.5)
HGB BLD-MCNC: 5.1 G/DL (ref 12–16)
HGB BLD-MCNC: 9.4 G/DL (ref 12–16)
HYPOCHROMIA BLD QL SMEAR: ABNORMAL
IMM GRANULOCYTES # BLD AUTO: 0.07 K/UL (ref 0–0.04)
IMM GRANULOCYTES # BLD AUTO: 0.13 K/UL (ref 0–0.04)
IMM GRANULOCYTES NFR BLD AUTO: 0.8 % (ref 0–0.5)
IMM GRANULOCYTES NFR BLD AUTO: 1.2 % (ref 0–0.5)
LIPASE SERPL-CCNC: 21 U/L (ref 4–60)
LYMPHOCYTES # BLD AUTO: 1.8 K/UL (ref 1–4.8)
LYMPHOCYTES # BLD AUTO: 1.9 K/UL (ref 1–4.8)
LYMPHOCYTES NFR BLD: 17 % (ref 18–48)
LYMPHOCYTES NFR BLD: 20.3 % (ref 18–48)
MCH RBC QN AUTO: 30.6 PG (ref 27–31)
MCH RBC QN AUTO: 30.7 PG (ref 27–31)
MCHC RBC AUTO-ENTMCNC: 32.3 G/DL (ref 32–36)
MCHC RBC AUTO-ENTMCNC: 34.8 G/DL (ref 32–36)
MCV RBC AUTO: 88 FL (ref 82–98)
MCV RBC AUTO: 95 FL (ref 82–98)
MONOCYTES # BLD AUTO: 0.9 K/UL (ref 0.3–1)
MONOCYTES # BLD AUTO: 1.1 K/UL (ref 0.3–1)
MONOCYTES NFR BLD: 10.2 % (ref 4–15)
MONOCYTES NFR BLD: 9.6 % (ref 4–15)
NEUTROPHILS # BLD AUTO: 5.9 K/UL (ref 1.8–7.7)
NEUTROPHILS # BLD AUTO: 8 K/UL (ref 1.8–7.7)
NEUTROPHILS NFR BLD: 67.1 % (ref 38–73)
NEUTROPHILS NFR BLD: 71.8 % (ref 38–73)
NRBC BLD-RTO: 4 /100 WBC
NRBC BLD-RTO: 6 /100 WBC
NUM UNITS TRANS PACKED RBC: NORMAL
NUM UNITS TRANS PACKED RBC: NORMAL
PLATELET # BLD AUTO: 103 K/UL (ref 150–450)
PLATELET # BLD AUTO: 150 K/UL (ref 150–450)
PLATELET BLD QL SMEAR: ABNORMAL
PMV BLD AUTO: 10.6 FL (ref 9.2–12.9)
PMV BLD AUTO: 9.8 FL (ref 9.2–12.9)
POCT GLUCOSE: 120 MG/DL (ref 70–110)
POLYCHROMASIA BLD QL SMEAR: ABNORMAL
POTASSIUM SERPL-SCNC: 6.6 MMOL/L (ref 3.5–5.1)
PROT SERPL-MCNC: 6.6 G/DL (ref 6–8.4)
RBC # BLD AUTO: 1.66 M/UL (ref 4–5.4)
RBC # BLD AUTO: 3.07 M/UL (ref 4–5.4)
SODIUM SERPL-SCNC: 141 MMOL/L (ref 136–145)
SPECIMEN OUTDATE: NORMAL
WBC # BLD AUTO: 11.11 K/UL (ref 3.9–12.7)
WBC # BLD AUTO: 8.76 K/UL (ref 3.9–12.7)

## 2024-05-11 PROCEDURE — 93005 ELECTROCARDIOGRAM TRACING: CPT

## 2024-05-11 PROCEDURE — 82962 GLUCOSE BLOOD TEST: CPT

## 2024-05-11 PROCEDURE — 86901 BLOOD TYPING SEROLOGIC RH(D): CPT | Performed by: EMERGENCY MEDICINE

## 2024-05-11 PROCEDURE — G0378 HOSPITAL OBSERVATION PER HR: HCPCS

## 2024-05-11 PROCEDURE — 36430 TRANSFUSION BLD/BLD COMPNT: CPT

## 2024-05-11 PROCEDURE — 80053 COMPREHEN METABOLIC PANEL: CPT | Performed by: EMERGENCY MEDICINE

## 2024-05-11 PROCEDURE — 94760 N-INVAS EAR/PLS OXIMETRY 1: CPT

## 2024-05-11 PROCEDURE — 36415 COLL VENOUS BLD VENIPUNCTURE: CPT | Performed by: EMERGENCY MEDICINE

## 2024-05-11 PROCEDURE — G0257 UNSCHED DIALYSIS ESRD PT HOS: HCPCS

## 2024-05-11 PROCEDURE — 85025 COMPLETE CBC W/AUTO DIFF WBC: CPT | Performed by: EMERGENCY MEDICINE

## 2024-05-11 PROCEDURE — 25000003 PHARM REV CODE 250: Performed by: EMERGENCY MEDICINE

## 2024-05-11 PROCEDURE — 93010 ELECTROCARDIOGRAM REPORT: CPT | Mod: ,,, | Performed by: GENERAL PRACTICE

## 2024-05-11 PROCEDURE — 83540 ASSAY OF IRON: CPT | Performed by: NURSE PRACTITIONER

## 2024-05-11 PROCEDURE — 99285 EMERGENCY DEPT VISIT HI MDM: CPT | Mod: 25

## 2024-05-11 PROCEDURE — 25000003 PHARM REV CODE 250: Performed by: NURSE PRACTITIONER

## 2024-05-11 PROCEDURE — 83690 ASSAY OF LIPASE: CPT | Performed by: EMERGENCY MEDICINE

## 2024-05-11 PROCEDURE — 27000221 HC OXYGEN, UP TO 24 HOURS

## 2024-05-11 PROCEDURE — 11000001 HC ACUTE MED/SURG PRIVATE ROOM

## 2024-05-11 PROCEDURE — 90935 HEMODIALYSIS ONE EVALUATION: CPT

## 2024-05-11 PROCEDURE — 5A1D70Z PERFORMANCE OF URINARY FILTRATION, INTERMITTENT, LESS THAN 6 HOURS PER DAY: ICD-10-PCS | Performed by: INTERNAL MEDICINE

## 2024-05-11 PROCEDURE — 99900035 HC TECH TIME PER 15 MIN (STAT)

## 2024-05-11 PROCEDURE — 85025 COMPLETE CBC W/AUTO DIFF WBC: CPT | Mod: 91 | Performed by: NURSE PRACTITIONER

## 2024-05-11 PROCEDURE — 25000003 PHARM REV CODE 250: Performed by: INTERNAL MEDICINE

## 2024-05-11 PROCEDURE — 36415 COLL VENOUS BLD VENIPUNCTURE: CPT | Performed by: NURSE PRACTITIONER

## 2024-05-11 PROCEDURE — 30233N1 TRANSFUSION OF NONAUTOLOGOUS RED BLOOD CELLS INTO PERIPHERAL VEIN, PERCUTANEOUS APPROACH: ICD-10-PCS | Performed by: INTERNAL MEDICINE

## 2024-05-11 PROCEDURE — 82728 ASSAY OF FERRITIN: CPT | Performed by: NURSE PRACTITIONER

## 2024-05-11 PROCEDURE — P9016 RBC LEUKOCYTES REDUCED: HCPCS | Performed by: INTERNAL MEDICINE

## 2024-05-11 PROCEDURE — 86920 COMPATIBILITY TEST SPIN: CPT | Performed by: INTERNAL MEDICINE

## 2024-05-11 RX ORDER — GLUCAGON 1 MG
1 KIT INJECTION
Status: DISCONTINUED | OUTPATIENT
Start: 2024-05-11 | End: 2024-05-13 | Stop reason: HOSPADM

## 2024-05-11 RX ORDER — CALCIUM GLUCONATE 20 MG/ML
1 INJECTION, SOLUTION INTRAVENOUS
Status: DISPENSED | OUTPATIENT
Start: 2024-05-11 | End: 2024-05-12

## 2024-05-11 RX ORDER — ONDANSETRON HYDROCHLORIDE 2 MG/ML
4 INJECTION, SOLUTION INTRAVENOUS EVERY 8 HOURS PRN
Status: DISCONTINUED | OUTPATIENT
Start: 2024-05-11 | End: 2024-05-13 | Stop reason: HOSPADM

## 2024-05-11 RX ORDER — SODIUM CHLORIDE 0.9 % (FLUSH) 0.9 %
10 SYRINGE (ML) INJECTION EVERY 8 HOURS PRN
Status: DISCONTINUED | OUTPATIENT
Start: 2024-05-11 | End: 2024-05-13 | Stop reason: HOSPADM

## 2024-05-11 RX ORDER — AMLODIPINE BESYLATE 5 MG/1
5 TABLET ORAL DAILY
Status: DISCONTINUED | OUTPATIENT
Start: 2024-05-12 | End: 2024-05-13 | Stop reason: HOSPADM

## 2024-05-11 RX ORDER — ALUMINUM HYDROXIDE, MAGNESIUM HYDROXIDE, AND SIMETHICONE 1200; 120; 1200 MG/30ML; MG/30ML; MG/30ML
30 SUSPENSION ORAL 4 TIMES DAILY PRN
Status: DISCONTINUED | OUTPATIENT
Start: 2024-05-11 | End: 2024-05-13 | Stop reason: HOSPADM

## 2024-05-11 RX ORDER — NALOXONE HCL 0.4 MG/ML
0.02 VIAL (ML) INJECTION
Status: DISCONTINUED | OUTPATIENT
Start: 2024-05-11 | End: 2024-05-13 | Stop reason: HOSPADM

## 2024-05-11 RX ORDER — ALUMINUM HYDROXIDE, MAGNESIUM HYDROXIDE, AND SIMETHICONE 1200; 120; 1200 MG/30ML; MG/30ML; MG/30ML
30 SUSPENSION ORAL
Status: COMPLETED | OUTPATIENT
Start: 2024-05-11 | End: 2024-05-11

## 2024-05-11 RX ORDER — ACETAMINOPHEN 325 MG/1
650 TABLET ORAL EVERY 4 HOURS PRN
Status: DISCONTINUED | OUTPATIENT
Start: 2024-05-11 | End: 2024-05-13 | Stop reason: HOSPADM

## 2024-05-11 RX ORDER — HYDROCODONE BITARTRATE AND ACETAMINOPHEN 500; 5 MG/1; MG/1
TABLET ORAL
Status: DISCONTINUED | OUTPATIENT
Start: 2024-05-11 | End: 2024-05-13 | Stop reason: HOSPADM

## 2024-05-11 RX ORDER — SODIUM CHLORIDE 9 MG/ML
INJECTION, SOLUTION INTRAVENOUS ONCE
Status: CANCELLED | OUTPATIENT
Start: 2024-05-11 | End: 2024-05-11

## 2024-05-11 RX ORDER — ONDANSETRON 4 MG/1
4 TABLET, ORALLY DISINTEGRATING ORAL
Status: COMPLETED | OUTPATIENT
Start: 2024-05-11 | End: 2024-05-11

## 2024-05-11 RX ORDER — SEVELAMER CARBONATE 800 MG/1
800 TABLET, FILM COATED ORAL
Status: DISCONTINUED | OUTPATIENT
Start: 2024-05-12 | End: 2024-05-13 | Stop reason: HOSPADM

## 2024-05-11 RX ORDER — CARVEDILOL 25 MG/1
25 TABLET ORAL 2 TIMES DAILY
Status: DISCONTINUED | OUTPATIENT
Start: 2024-05-11 | End: 2024-05-13 | Stop reason: HOSPADM

## 2024-05-11 RX ORDER — INSULIN ASPART 100 [IU]/ML
1-10 INJECTION, SOLUTION INTRAVENOUS; SUBCUTANEOUS
Status: DISCONTINUED | OUTPATIENT
Start: 2024-05-11 | End: 2024-05-13 | Stop reason: HOSPADM

## 2024-05-11 RX ORDER — ACETAMINOPHEN 325 MG/1
650 TABLET ORAL EVERY 6 HOURS PRN
Status: DISCONTINUED | OUTPATIENT
Start: 2024-05-11 | End: 2024-05-11 | Stop reason: SDUPTHER

## 2024-05-11 RX ORDER — SIMETHICONE 80 MG
1 TABLET,CHEWABLE ORAL 4 TIMES DAILY PRN
Status: DISCONTINUED | OUTPATIENT
Start: 2024-05-11 | End: 2024-05-13 | Stop reason: HOSPADM

## 2024-05-11 RX ORDER — OXYCODONE HYDROCHLORIDE 5 MG/1
5 TABLET ORAL EVERY 6 HOURS PRN
Status: DISCONTINUED | OUTPATIENT
Start: 2024-05-11 | End: 2024-05-13 | Stop reason: HOSPADM

## 2024-05-11 RX ORDER — IPRATROPIUM BROMIDE AND ALBUTEROL SULFATE 2.5; .5 MG/3ML; MG/3ML
3 SOLUTION RESPIRATORY (INHALATION) EVERY 4 HOURS PRN
Status: DISCONTINUED | OUTPATIENT
Start: 2024-05-11 | End: 2024-05-13 | Stop reason: HOSPADM

## 2024-05-11 RX ORDER — IBUPROFEN 200 MG
24 TABLET ORAL
Status: DISCONTINUED | OUTPATIENT
Start: 2024-05-11 | End: 2024-05-13 | Stop reason: HOSPADM

## 2024-05-11 RX ORDER — LEVETIRACETAM 500 MG/1
500 TABLET ORAL 2 TIMES DAILY
Status: DISCONTINUED | OUTPATIENT
Start: 2024-05-11 | End: 2024-05-13 | Stop reason: HOSPADM

## 2024-05-11 RX ORDER — IBUPROFEN 200 MG
16 TABLET ORAL
Status: DISCONTINUED | OUTPATIENT
Start: 2024-05-11 | End: 2024-05-13 | Stop reason: HOSPADM

## 2024-05-11 RX ORDER — HYDRALAZINE HYDROCHLORIDE 25 MG/1
100 TABLET, FILM COATED ORAL 2 TIMES DAILY
Status: DISCONTINUED | OUTPATIENT
Start: 2024-05-11 | End: 2024-05-13 | Stop reason: HOSPADM

## 2024-05-11 RX ORDER — ACETAMINOPHEN 325 MG/1
650 TABLET ORAL
Status: COMPLETED | OUTPATIENT
Start: 2024-05-11 | End: 2024-05-11

## 2024-05-11 RX ORDER — TALC
9 POWDER (GRAM) TOPICAL NIGHTLY PRN
Status: DISCONTINUED | OUTPATIENT
Start: 2024-05-11 | End: 2024-05-13 | Stop reason: HOSPADM

## 2024-05-11 RX ORDER — MUPIROCIN 20 MG/G
OINTMENT TOPICAL 2 TIMES DAILY
Status: DISCONTINUED | OUTPATIENT
Start: 2024-05-11 | End: 2024-05-13 | Stop reason: HOSPADM

## 2024-05-11 RX ADMIN — LEVETIRACETAM 500 MG: 500 TABLET, FILM COATED ORAL at 08:05

## 2024-05-11 RX ADMIN — CARVEDILOL 25 MG: 25 TABLET, FILM COATED ORAL at 08:05

## 2024-05-11 RX ADMIN — ALUMINUM HYDROXIDE, MAGNESIUM HYDROXIDE, AND DIMETHICONE 30 ML: 200; 20; 200 SUSPENSION ORAL at 01:05

## 2024-05-11 RX ADMIN — ACETAMINOPHEN 650 MG: 325 TABLET ORAL at 01:05

## 2024-05-11 RX ADMIN — ONDANSETRON 4 MG: 4 TABLET, ORALLY DISINTEGRATING ORAL at 01:05

## 2024-05-11 RX ADMIN — HYDRALAZINE HYDROCHLORIDE 100 MG: 25 TABLET, FILM COATED ORAL at 08:05

## 2024-05-11 NOTE — SUBJECTIVE & OBJECTIVE
Past Medical History:   Diagnosis Date    ESRD on hemodialysis 2022    Gastritis 2022    EGD was 22    Gastroparesis 2022    has not had Emptying study    Heart failure with preserved ejection fraction 2022    EF 55% on 3/22    History of supraventricular tachycardia     Hyperkalemia 2022    Hypertensive emergency 2022    Sickle cell trait 2022    Type 2 diabetes mellitus        Past Surgical History:   Procedure Laterality Date     SECTION      x 3    COLONOSCOPY      COLONOSCOPY N/A 2022    Procedure: COLONOSCOPY;  Surgeon: Jagdeep Cedeno MD;  Location: [facility];  Service: Endoscopy;  Laterality: N/A;    DESTRUCTION, CILIARY BODY, USING LASER Left 3/8/2024    Procedure: Cyclophotocoagulation G-probe, retrobulbar chlorpromazine left eye;  Surgeon: Fidel Wise MD;  Location: [address];  Service: Ophthalmology;  Laterality: Left;    ENDOSCOPIC ULTRASOUND OF UPPER GASTROINTESTINAL TRACT N/A 2023    Procedure: ULTRASOUND, UPPER GI TRACT, ENDOSCOPIC;  Surgeon: Micky Paredes III, MD;  Location: [facility];  Service: Endoscopy;  Laterality: N/A;    ESOPHAGOGASTRODUODENOSCOPY N/A 10/18/2019    Procedure: ESOPHAGOGASTRODUODENOSCOPY (EGD);  Surgeon: Gianluca Mendez MD;  Location: [facility];  Service: Endoscopy;  Laterality: N/A;    ESOPHAGOGASTRODUODENOSCOPY N/A 2022    Procedure: EGD (ESOPHAGOGASTRODUODENOSCOPY);  Surgeon: Micky Paredes III, MD;  Location: [facility];  Service: Endoscopy;  Laterality: N/A;    ESOPHAGOGASTRODUODENOSCOPY N/A 2022    Procedure: EGD (ESOPHAGOGASTRODUODENOSCOPY);  Surgeon: Marcelo Zhong MD;  Location: [facility];  Service: Endoscopy;  Laterality: N/A;    INSERTION, CATHETER, TUNNELED N/A 2023    Procedure: Insertion,catheter,tunneled;  Surgeon: Carlos Thurman Jr., MD;  Location: [facility];  Service: General;  Laterality: N/A;    LAPAROSCOPIC CHOLECYSTECTOMY N/A 2022    Procedure:  CHOLECYSTECTOMY, LAPAROSCOPIC;  Surgeon: Grey Perez MD;  Location: [facility] OR;  Service: General;  Laterality: N/A;    PLACEMENT OF DUAL-LUMEN VASCULAR CATHETER Left 07/12/2022    Procedure: INSERTION-CATHETER-JOSEPH;  Surgeon: Dionte Gan MD;  Location: [facility] OR;  Service: General;  Laterality: Left;    PLACEMENT OF DUAL-LUMEN VASCULAR CATHETER Right 07/26/2022    Procedure: INSERTION-CATHETER-Hemosplit;  Surgeon: Dionte Gan MD;  Location: [facility] OR;  Service: General;  Laterality: Right;       Review of patient's allergies indicates:   Allergen Reactions    Penicillins Hives       No current facility-administered medications on file prior to encounter.     Current Outpatient Medications on File Prior to Encounter   Medication Sig    amLODIPine (NORVASC) 5 MG tablet Take 1 tablet (5 mg total) by mouth once daily.    amLODIPine (NORVASC) 5 MG tablet take 2 tablets Oral Daily    apixaban (ELIQUIS) 5 mg Tab Take 1 tablet (5 mg total) by mouth 2 (two) times daily. Patient w/ thrombocytopenia <50, so held during admission, follow-up with hematologist about restarting    atropine 1% (ISOPTO ATROPINE) 1 % Drop Place 1 drop into the left eye 2 (two) times a day.    blood sugar diagnostic (TRUE METRIX GLUCOSE TEST STRIP) Strp Use 1 strip to check blood glucose three times daily    blood-glucose meter (TRUE METRIX GLUCOSE METER) Misc Use to check blood glucose    blood-glucose meter,continuous (DEXCOM ) Misc USE WITH SENSORS    blood-glucose sensor Heather CHANGE EVERY 10 DAYS    brimonidine 0.15 % OPTH DROP (ALPHAGAN) 0.15 % ophthalmic solution Place 1 drop into both eyes 3 (three) times daily.    busPIRone (BUSPAR) 10 MG tablet Take 1 tablet (10 mg total) by mouth 3 (three) times daily as needed (anxiety).    carvediloL (COREG) 25 MG tablet take 1 tablet Oral 2 times daily    cetirizine (ZYRTEC) 10 MG tablet Take 1 tablet every day by oral route.    dorzolamide-timolol 2-0.5% (COSOPT) 22.3-6.8 mg/mL  ophthalmic solution Place 1 drop into the left eye every 12 (twelve) hours.    dorzolamide-timolol 2-0.5% (COSOPT) 22.3-6.8 mg/mL ophthalmic solution place 1 drop in left eye twice a day  for 30 days    FLUoxetine 20 MG capsule Take 1 capsule every day by oral route for 90 days.    FLUoxetine 20 MG capsule take 2 capsules Oral Daily    FLUoxetine 20 MG capsule Take 1 capsule (20 mg total) by mouth once daily, for Mood..    fluticasone propionate (FLONASE ALLERGY RELIEF) 50 mcg/actuation nasal spray [place] 1 spray every day by intranasal route.    gabapentin (NEURONTIN) 300 MG capsule Take 2 capsules twice a day by oral route for 90 days.    hydrALAZINE (APRESOLINE) 100 MG tablet Take 1 tablet (100 mg total) by mouth 2 (two) times daily.    hydrALAZINE (APRESOLINE) 25 MG tablet take 1 tablet by mouth every 8 hours    insulin detemir U-100, Levemir, (LEVEMIR FLEXTOUCH U100 INSULIN) 100 unit/mL (3 mL) InPn pen Inject 22 Units into the skin every evening. Patient not needing insulin in-patient. Follow-up with PCP for hospital follow-up and restarting the medication. Or restart medication if glucose >200 consistently resume home insulin regimen. Or if glucose is persistently less than 100, decrease by 5 units until following up with PCP    insulin glargine U-100, Lantus, (LANTUS) 100 unit/mL injection inject 13 unit subcutaneously 2 times daily    isosorbide mononitrate (IMDUR) 30 MG 24 hr tablet Take 1 tablet (30 mg total) by mouth once daily.    lancets (TRUEPLUS LANCETS) 33 gauge Misc Use 1 to check blood glucose three times daily    lancets 33 gauge Misc Use to test twice daily    levETIRAcetam (KEPPRA) 500 MG Tab Take 1 tablet (500 mg total) by mouth 2 (two) times daily.    ondansetron (ZOFRAN-ODT) 4 MG TbDL Place 1 tablet (4 mg) on or under the tongue every 8 hours for 10 days.    oxyCODONE-acetaminophen (PERCOCET)  mg per tablet Take 1 tablet by mouth every 4 (four) hours as needed for Pain.    pen needle,  "diabetic 31 gauge x 3/16" Ndle Use as directed to inject insulin 5 times daily    pen needle, diabetic 31 gauge x 3/16" Ndle Use as directed with insulin once daily    prednisoLONE acetate (PRED FORTE) 1 % DrpS Place 1 drop into the left eye 2 (two) times daily.    prednisoLONE acetate (PRED FORTE) 1 % DrpS Place 1 drop in left eye 4 times daily for 5 days    predniSONE (DELTASONE) 20 MG tablet take 3 tablets Oral Daily,x30 day(s)    sevelamer carbonate (RENVELA) 800 mg Tab Take 1 tablet (800 mg total) by mouth 3 (three) times daily with meals.    sucralfate (CARAFATE) 100 mg/mL suspension Take 10 mL 4 times a day by oral route before meals for 30 days.    tobramycin-dexAMETHasone 0.3-0.1% (TOBRADEX) 0.3-0.1 % DrpS 1 drop Eye-Left every 6 hours for 5 day(s)    [DISCONTINUED] atenoloL (TENORMIN) 50 MG tablet Take 1 tablet (50 mg total) by mouth every other day.    [DISCONTINUED] omeprazole (PRILOSEC) 20 MG capsule Take 2 capsules (40 mg total) by mouth once daily. for 10 days     Family History       Problem Relation (Age of Onset)    Diabetes Mother, Father          Tobacco Use    Smoking status: Never    Smokeless tobacco: Never   Substance and Sexual Activity    Alcohol use: Not Currently    Drug use: No    Sexual activity: Not Currently     Partners: Male     Birth control/protection: I.U.D.     Review of Systems   Constitutional:  Positive for activity change. Negative for chills, diaphoresis and fever.   HENT:  Negative for congestion, nosebleeds and tinnitus.    Eyes:  Negative for photophobia and visual disturbance.   Respiratory:  Negative for cough, chest tightness, shortness of breath and wheezing.    Cardiovascular:  Negative for chest pain, palpitations and leg swelling.   Gastrointestinal:  Positive for abdominal distention, abdominal pain, nausea and vomiting. Negative for constipation and diarrhea.   Endocrine: Negative for cold intolerance and heat intolerance.   Genitourinary:  Negative for " difficulty urinating, dysuria, frequency, hematuria and urgency.   Musculoskeletal:  Negative for arthralgias, back pain and myalgias.   Skin:  Negative for pallor, rash and wound.   Allergic/Immunologic: Negative for immunocompromised state.   Neurological:  Negative for dizziness, tremors, facial asymmetry, speech difficulty and weakness.   Hematological:  Negative for adenopathy. Does not bruise/bleed easily.   Psychiatric/Behavioral:  Negative for confusion and sleep disturbance. The patient is not nervous/anxious.      Objective:     Vital Signs (Most Recent):  Temp: 96.7 °F (35.9 °C) (05/11/24 1730)  Pulse: 77 (05/11/24 1815)  Resp: 20 (05/11/24 1630)  BP: (!) 209/116 (05/11/24 1800)  SpO2: 100 % (05/11/24 1800) Vital Signs (24h Range):  Temp:  [96.6 °F (35.9 °C)-98.2 °F (36.8 °C)] 96.7 °F (35.9 °C)  Pulse:  [70-77] 77  Resp:  [20] 20  SpO2:  [98 %-100 %] 100 %  BP: (115-209)/() 209/116     Weight: 53.1 kg (117 lb)  Body mass index is 21.4 kg/m².     Physical Exam  Vitals and nursing note reviewed.   Constitutional:       General: She is not in acute distress.     Appearance: She is well-developed. She is not diaphoretic.   HENT:      Head: Normocephalic.      Mouth/Throat:      Mouth: Mucous membranes are dry.   Eyes:      General: No scleral icterus.     Conjunctiva/sclera: Conjunctivae normal.      Pupils: Pupils are equal, round, and reactive to light.   Neck:      Vascular: No JVD.   Cardiovascular:      Rate and Rhythm: Regular rhythm. Tachycardia present.      Heart sounds: Normal heart sounds. No murmur heard.     No friction rub. No gallop.   Pulmonary:      Effort: Pulmonary effort is normal. No respiratory distress.      Breath sounds: Normal breath sounds. No wheezing or rales.   Abdominal:      General: Bowel sounds are normal. There is no distension.      Palpations: Abdomen is soft.      Tenderness: There is abdominal tenderness. There is no guarding or rebound.   Musculoskeletal:          "General: No tenderness. Normal range of motion.      Cervical back: Normal range of motion and neck supple.   Lymphadenopathy:      Cervical: No cervical adenopathy.   Skin:     General: Skin is warm and dry.      Capillary Refill: Capillary refill takes less than 2 seconds.      Coloration: Skin is not pale.      Findings: No erythema or rash.   Neurological:      Mental Status: She is alert and oriented to person, place, and time.      Cranial Nerves: No cranial nerve deficit.      Sensory: No sensory deficit.      Coordination: Coordination normal.      Deep Tendon Reflexes: Reflexes normal.   Psychiatric:         Behavior: Behavior normal.         Thought Content: Thought content normal.         Judgment: Judgment normal.              CRANIAL NERVES     CN III, IV, VI   Pupils are equal, round, and reactive to light.       Significant Labs: All pertinent labs within the past 24 hours have been reviewed.  CBC:   Recent Labs   Lab 05/11/24  1341   WBC 11.11   HGB 5.1*   HCT 15.8*        CMP:   Recent Labs   Lab 05/11/24  1341      K 6.6*      CO2 17*   *   BUN 94*   CREATININE 9.5*   CALCIUM 7.4*   PROT 6.6   ALBUMIN 3.7   BILITOT 2.6*   ALKPHOS 227*   AST 56*   ALT 69*   ANIONGAP 24*     Cardiac Markers: No results for input(s): "CKMB", "MYOGLOBIN", "BNP", "TROPISTAT" in the last 48 hours.    Significant Imaging: I have reviewed all pertinent imaging results/findings within the past 24 hours.    CT    Impression:     Small amount of ascites has become apparent since the prior exam.  Pericardial effusion appears mildly decreased and decrease of the bibasilar ground-glass opacification and the diffuse body wall fat stranding.  Cardiomegaly, atrophied kidneys and renal vascular calcifications as before.  "

## 2024-05-11 NOTE — ASSESSMENT & PLAN NOTE
Creatine stable for now. BMP reviewed- noted Estimated Creatinine Clearance: 6.5 mL/min (A) (based on SCr of 9.5 mg/dL (H)). according to latest data. Based on current GFR, CKD stage is end stage.  Monitor UOP and serial BMP and adjust therapy as needed. Renally dose meds. Avoid nephrotoxic medications and procedures.

## 2024-05-11 NOTE — ED PROVIDER NOTES
Chief complaint:  Abdominal Pain (Pt comes in via ems with c/o abdominal pain starting yesterday )      HPI:  Tabby Howard is a 34 y.o. female with hx DM, HFpEF, ESRD on HD, gastroparesis, gastritis, htn, and multiple prior surgeries including cholex,  presenting with multiple days of left upper quadrant abdominal pain seen at outside ED 2 days prior with limited notes available from visit.  She did have left upper quadrant pain at that time similar to present.  Patient describes constant pain in the left upper quadrant accompanied by nonbilious, nonbloody vomiting.  She has extensive GI history including gastroparesis with similar presentations for same.  Vomiting is nonbilious and nonbloody.  She denies change in bowel movements, blood in the stools, dark stools.  No recent fever.  No hematemesis.  No associated chest pain or dyspnea.  She is anuric and does not make urine.  She last attended dialysis Tuesday and has missed the last 2 sessions.    ROS: As per HPI and below:  No new swelling, syncope, rash, focal numbness or weakness, back pain.    Review of patient's allergies indicates:   Allergen Reactions    Penicillins Hives       Patient's Medications   New Prescriptions    No medications on file   Previous Medications    AMLODIPINE (NORVASC) 5 MG TABLET    Take 1 tablet (5 mg total) by mouth once daily.    AMLODIPINE (NORVASC) 5 MG TABLET    take 2 tablets Oral Daily    APIXABAN (ELIQUIS) 5 MG TAB    Take 1 tablet (5 mg total) by mouth 2 (two) times daily. Patient w/ thrombocytopenia <50, so held during admission, follow-up with hematologist about restarting    ATROPINE 1% (ISOPTO ATROPINE) 1 % DROP    Place 1 drop into the left eye 2 (two) times a day.    BLOOD SUGAR DIAGNOSTIC (TRUE METRIX GLUCOSE TEST STRIP) STRP    Use 1 strip to check blood glucose three times daily    BLOOD-GLUCOSE METER (TRUE METRIX GLUCOSE METER) MISC    Use to check blood glucose    BLOOD-GLUCOSE METER,CONTINUOUS (DEXCOM  ) MISC    USE WITH SENSORS    BLOOD-GLUCOSE SENSOR ELIZABETH    CHANGE EVERY 10 DAYS    BRIMONIDINE 0.15 % OPTH DROP (ALPHAGAN) 0.15 % OPHTHALMIC SOLUTION    Place 1 drop into both eyes 3 (three) times daily.    BUSPIRONE (BUSPAR) 10 MG TABLET    Take 1 tablet (10 mg total) by mouth 3 (three) times daily as needed (anxiety).    CARVEDILOL (COREG) 25 MG TABLET    take 1 tablet Oral 2 times daily    CETIRIZINE (ZYRTEC) 10 MG TABLET    Take 1 tablet every day by oral route.    DORZOLAMIDE-TIMOLOL 2-0.5% (COSOPT) 22.3-6.8 MG/ML OPHTHALMIC SOLUTION    Place 1 drop into the left eye every 12 (twelve) hours.    DORZOLAMIDE-TIMOLOL 2-0.5% (COSOPT) 22.3-6.8 MG/ML OPHTHALMIC SOLUTION    place 1 drop in left eye twice a day  for 30 days    FLUOXETINE 20 MG CAPSULE    Take 1 capsule every day by oral route for 90 days.    FLUOXETINE 20 MG CAPSULE    take 2 capsules Oral Daily    FLUOXETINE 20 MG CAPSULE    Take 1 capsule (20 mg total) by mouth once daily, for Mood..    FLUTICASONE PROPIONATE (FLONASE ALLERGY RELIEF) 50 MCG/ACTUATION NASAL SPRAY    [place] 1 spray every day by intranasal route.    GABAPENTIN (NEURONTIN) 300 MG CAPSULE    Take 2 capsules twice a day by oral route for 90 days.    HYDRALAZINE (APRESOLINE) 100 MG TABLET    Take 1 tablet (100 mg total) by mouth 2 (two) times daily.    HYDRALAZINE (APRESOLINE) 25 MG TABLET    take 1 tablet by mouth every 8 hours    INSULIN DETEMIR U-100, LEVEMIR, (LEVEMIR FLEXTOUCH U100 INSULIN) 100 UNIT/ML (3 ML) INPN PEN    Inject 22 Units into the skin every evening. Patient not needing insulin in-patient. Follow-up with PCP for hospital follow-up and restarting the medication. Or restart medication if glucose >200 consistently resume home insulin regimen. Or if glucose is persistently less than 100, decrease by 5 units until following up with PCP    INSULIN GLARGINE U-100, LANTUS, (LANTUS) 100 UNIT/ML INJECTION    inject 13 unit subcutaneously 2 times daily    ISOSORBIDE  "MONONITRATE (IMDUR) 30 MG 24 HR TABLET    Take 1 tablet (30 mg total) by mouth once daily.    LANCETS (TRUEPLUS LANCETS) 33 GAUGE MISC    Use 1 to check blood glucose three times daily    LANCETS 33 GAUGE MISC    Use to test twice daily    LEVETIRACETAM (KEPPRA) 500 MG TAB    Take 1 tablet (500 mg total) by mouth 2 (two) times daily.    ONDANSETRON (ZOFRAN-ODT) 4 MG TBDL    Place 1 tablet (4 mg) on or under the tongue every 8 hours for 10 days.    OXYCODONE-ACETAMINOPHEN (PERCOCET)  MG PER TABLET    Take 1 tablet by mouth every 4 (four) hours as needed for Pain.    PEN NEEDLE, DIABETIC 31 GAUGE X 3/16" NDLE    Use as directed to inject insulin 5 times daily    PEN NEEDLE, DIABETIC 31 GAUGE X 3/16" NDLE    Use as directed with insulin once daily    PREDNISOLONE ACETATE (PRED FORTE) 1 % DRPS    Place 1 drop into the left eye 2 (two) times daily.    PREDNISOLONE ACETATE (PRED FORTE) 1 % DRPS    Place 1 drop in left eye 4 times daily for 5 days    PREDNISONE (DELTASONE) 20 MG TABLET    take 3 tablets Oral Daily,x30 day(s)    SEVELAMER CARBONATE (RENVELA) 800 MG TAB    Take 1 tablet (800 mg total) by mouth 3 (three) times daily with meals.    SUCRALFATE (CARAFATE) 100 MG/ML SUSPENSION    Take 10 mL 4 times a day by oral route before meals for 30 days.    TOBRAMYCIN-DEXAMETHASONE 0.3-0.1% (TOBRADEX) 0.3-0.1 % DRPS    1 drop Eye-Left every 6 hours for 5 day(s)   Modified Medications    No medications on file   Discontinued Medications    No medications on file       PMH:  As per HPI and below:  Past Medical History:   Diagnosis Date    ESRD on hemodialysis 04/12/2022    Gastritis 12/2022    EGD was 12/5/22    Gastroparesis 04/12/2022    has not had Emptying study    Heart failure with preserved ejection fraction 04/12/2022    EF 55% on 3/22    History of supraventricular tachycardia     Hyperkalemia 07/07/2022    Hypertensive emergency 07/08/2022    Sickle cell trait 04/12/2022    Type 2 diabetes mellitus      Past " Surgical History:   Procedure Laterality Date     SECTION      x 3    COLONOSCOPY      COLONOSCOPY N/A 2022    Procedure: COLONOSCOPY;  Surgeon: Jagdeep Cedeno MD;  Location: [facility];  Service: Endoscopy;  Laterality: N/A;    DESTRUCTION, CILIARY BODY, USING LASER Left 3/8/2024    Procedure: Cyclophotocoagulation G-probe, retrobulbar chlorpromazine left eye;  Surgeon: Fidel Wise MD;  Location: [address];  Service: Ophthalmology;  Laterality: Left;    ENDOSCOPIC ULTRASOUND OF UPPER GASTROINTESTINAL TRACT N/A 2023    Procedure: ULTRASOUND, UPPER GI TRACT, ENDOSCOPIC;  Surgeon: Micky Paredes III, MD;  Location: [facility];  Service: Endoscopy;  Laterality: N/A;    ESOPHAGOGASTRODUODENOSCOPY N/A 10/18/2019    Procedure: ESOPHAGOGASTRODUODENOSCOPY (EGD);  Surgeon: Gianluca Mendez MD;  Location: [facility];  Service: Endoscopy;  Laterality: N/A;    ESOPHAGOGASTRODUODENOSCOPY N/A 2022    Procedure: EGD (ESOPHAGOGASTRODUODENOSCOPY);  Surgeon: Micky Paredes III, MD;  Location: [facility];  Service: Endoscopy;  Laterality: N/A;    ESOPHAGOGASTRODUODENOSCOPY N/A 2022    Procedure: EGD (ESOPHAGOGASTRODUODENOSCOPY);  Surgeon: Marcelo Zhong MD;  Location: [facility];  Service: Endoscopy;  Laterality: N/A;    INSERTION, CATHETER, TUNNELED N/A 2023    Procedure: Insertion,catheter,tunneled;  Surgeon: Carlos Thurman Jr., MD;  Location: [facility];  Service: General;  Laterality: N/A;    LAPAROSCOPIC CHOLECYSTECTOMY N/A 2022    Procedure: CHOLECYSTECTOMY, LAPAROSCOPIC;  Surgeon: Grey Perez MD;  Location: [facility] OR;  Service: General;  Laterality: N/A;    PLACEMENT OF DUAL-LUMEN VASCULAR CATHETER Left 2022    Procedure: INSERTION-CATHETER-JOSEPH;  Surgeon: Dionte Gan MD;  Location: [facility] OR;  Service: General;  Laterality: Left;    PLACEMENT OF DUAL-LUMEN VASCULAR CATHETER Right 2022    Procedure: INSERTION-CATHETER-Hemosplit;  Surgeon: Dionte  GIANNA Gan MD;  Location: [facility];  Service: General;  Laterality: Right;       Social History     Socioeconomic History    Marital status:    Tobacco Use    Smoking status: Never    Smokeless tobacco: Never   Substance and Sexual Activity    Alcohol use: Not Currently    Drug use: No    Sexual activity: Not Currently     Partners: Male     Birth control/protection: I.U.D.     Social Determinants of Health     Financial Resource Strain: Low Risk  (2/28/2024)    Overall Financial Resource Strain (CARDIA)     Difficulty of Paying Living Expenses: Not very hard   Food Insecurity: No Food Insecurity (2/28/2024)    Hunger Vital Sign     Worried About Running Out of Food in the Last Year: Never true     Ran Out of Food in the Last Year: Never true   Transportation Needs: No Transportation Needs (2/28/2024)    PRAPARE - Transportation     Lack of Transportation (Medical): No     Lack of Transportation (Non-Medical): No   Physical Activity: Insufficiently Active (2/28/2024)    Exercise Vital Sign     Days of Exercise per Week: 1 day     Minutes of Exercise per Session: 10 min   Stress: No Stress Concern Present (2/28/2024)    [employer] - Occupational Stress Questionnaire     Feeling of Stress : Only a little   Housing Stability: Low Risk  (2/28/2024)    Housing Stability Vital Sign     Unable to Pay for Housing in the Last Year: No     Number of Places Lived in the Last Year: 2     Unstable Housing in the Last Year: No       Family History   Problem Relation Name Age of Onset    Diabetes Mother      Diabetes Father         Physical Exam:    Vitals:    05/11/24 1615   BP: (!) 157/92   Pulse: 70   Resp:    Temp:      GENERAL:  No apparent distress.  Alert.    HEENT:  Moist mucous membranes.  Normocephalic and atraumatic.    NECK:  No swelling.  Midline trachea.   CARDIOVASCULAR:  Regular rate and rhythm.  2+ radial pulses.    PULMONARY:  Lungs clear to auscultation bilaterally.  No  wheezes, rales, or rhonci.    ABDOMEN:  Poorly localized abdominal tenderness with predominance in left upper quadrant.  No guarding with distraction.  No distention.  No palpable masses or hernias.  EXTREMITIES:  Warm and well perfused.  Brisk capillary refill.  No peripheral edema.  NEUROLOGICAL:  Normal mental status.  Appropriate and conversant.    SKIN:  No rashes or ecchymoses.    BACK:  Atraumatic.  No CVA tenderness to palpation.      Labs Reviewed   CBC W/ AUTO DIFFERENTIAL - Abnormal; Notable for the following components:       Result Value    RBC 1.66 (*)     Hemoglobin 5.1 (*)     Hematocrit 15.8 (*)     RDW 17.5 (*)     Immature Granulocytes 1.2 (*)     Gran # (ANC) 8.0 (*)     Immature Grans (Abs) 0.13 (*)     Mono # 1.1 (*)     nRBC 6 (*)     Lymph % 17.0 (*)     All other components within normal limits    Narrative:        critical result(s) called and verbal readback obtained from   Beulah Holcomb @1956 by [facility] 05/11/2024 14:15   COMPREHENSIVE METABOLIC PANEL - Abnormal; Notable for the following components:    Potassium 6.6 (*)     CO2 17 (*)     Glucose 230 (*)     BUN 94 (*)     Creatinine 9.5 (*)     Calcium 7.4 (*)     Total Bilirubin 2.6 (*)     Alkaline Phosphatase 227 (*)     AST 56 (*)     ALT 69 (*)     eGFR 5 (*)     Anion Gap 24 (*)     All other components within normal limits    Narrative:     k critical result(s) called and verbal readback obtained from sue santiago rn by [facility] 05/11/2024 14:35   LIPASE   TYPE & SCREEN   PREPARE RBC SOFT       Current Discharge Medication List        CONTINUE these medications which have NOT CHANGED    Details   !! amLODIPine (NORVASC) 5 MG tablet Take 1 tablet (5 mg total) by mouth once daily.  Qty: 30 tablet, Refills: 11    Comments: .      !! amLODIPine (NORVASC) 5 MG tablet take 2 tablets Oral Daily  Qty: 60 tablet, Refills: 0    Comments: .      apixaban (ELIQUIS) 5 mg Tab Take 1 tablet (5 mg total) by mouth 2 (two) times daily. Patient w/  thrombocytopenia <50, so held during admission, follow-up with hematologist about restarting  Qty: 180 tablet, Refills: 1      atropine 1% (ISOPTO ATROPINE) 1 % Drop Place 1 drop into the left eye 2 (two) times a day.  Qty: 15 mL, Refills: 3      blood sugar diagnostic (TRUE METRIX GLUCOSE TEST STRIP) Strp Use 1 strip to check blood glucose three times daily  Qty: 100 each, Refills: 11      blood-glucose meter (TRUE METRIX GLUCOSE METER) Misc Use to check blood glucose  Qty: 1 each, Refills: 0      blood-glucose meter,continuous (DEXCOM ) Misc USE WITH SENSORS  Qty: 1 each, Refills: 0      blood-glucose sensor Heather CHANGE EVERY 10 DAYS  Qty: 3 each, Refills: 0      brimonidine 0.15 % OPTH DROP (ALPHAGAN) 0.15 % ophthalmic solution Place 1 drop into both eyes 3 (three) times daily.  Qty: 15 mL, Refills: 3      busPIRone (BUSPAR) 10 MG tablet Take 1 tablet (10 mg total) by mouth 3 (three) times daily as needed (anxiety).  Qty: 60 tablet, Refills: 5      carvediloL (COREG) 25 MG tablet take 1 tablet Oral 2 times daily  Qty: 60 tablet, Refills: 0    Comments: .      cetirizine (ZYRTEC) 10 MG tablet Take 1 tablet every day by oral route.  Qty: 30 tablet, Refills: 0      !! dorzolamide-timolol 2-0.5% (COSOPT) 22.3-6.8 mg/mL ophthalmic solution Place 1 drop into the left eye every 12 (twelve) hours.  Qty: 10 mL, Refills: 6      !! dorzolamide-timolol 2-0.5% (COSOPT) 22.3-6.8 mg/mL ophthalmic solution place 1 drop in left eye twice a day  for 30 days  Qty: 10 mL, Refills: 0      !! FLUoxetine 20 MG capsule Take 1 capsule every day by oral route for 90 days.  Qty: 90 capsule, Refills: 1      !! FLUoxetine 20 MG capsule take 2 capsules Oral Daily  Qty: 60 capsule, Refills: 0      !! FLUoxetine 20 MG capsule Take 1 capsule (20 mg total) by mouth once daily, for Mood..  Qty: 90 capsule, Refills: 2      fluticasone propionate (FLONASE ALLERGY RELIEF) 50 mcg/actuation nasal spray [place] 1 spray every day by intranasal  "route.  Qty: 16 g, Refills: 2      gabapentin (NEURONTIN) 300 MG capsule Take 2 capsules twice a day by oral route for 90 days.  Qty: 360 capsule, Refills: 1      !! hydrALAZINE (APRESOLINE) 100 MG tablet Take 1 tablet (100 mg total) by mouth 2 (two) times daily.  Qty: 180 tablet, Refills: 1    Comments: .      !! hydrALAZINE (APRESOLINE) 25 MG tablet take 1 tablet by mouth every 8 hours  Qty: 90 tablet, Refills: 0    Comments: .      insulin detemir U-100, Levemir, (LEVEMIR FLEXTOUCH U100 INSULIN) 100 unit/mL (3 mL) InPn pen Inject 22 Units into the skin every evening. Patient not needing insulin in-patient. Follow-up with PCP for hospital follow-up and restarting the medication. Or restart medication if glucose >200 consistently resume home insulin regimen. Or if glucose is persistently less than 100, decrease by 5 units until following up with PCP  Qty: 15 mL, Refills: 1      insulin glargine U-100, Lantus, (LANTUS) 100 unit/mL injection inject 13 unit subcutaneously 2 times daily  Qty: 10 mL, Refills: 0      isosorbide mononitrate (IMDUR) 30 MG 24 hr tablet Take 1 tablet (30 mg total) by mouth once daily.  Qty: 90 tablet, Refills: 1      !! lancets (TRUEPLUS LANCETS) 33 gauge Misc Use 1 to check blood glucose three times daily  Qty: 100 each, Refills: 11      !! lancets 33 gauge Misc Use to test twice daily  Qty: 100 each, Refills: 5      levETIRAcetam (KEPPRA) 500 MG Tab Take 1 tablet (500 mg total) by mouth 2 (two) times daily.  Qty: 60 tablet, Refills: 11      ondansetron (ZOFRAN-ODT) 4 MG TbDL Place 1 tablet (4 mg) on or under the tongue every 8 hours for 10 days.  Qty: 24 tablet, Refills: 2      oxyCODONE-acetaminophen (PERCOCET)  mg per tablet Take 1 tablet by mouth every 4 (four) hours as needed for Pain.  Qty: 42 tablet, Refills: 0    Comments: Quantity prescribed more than 7 day supply? No      !! pen needle, diabetic 31 gauge x 3/16" Ndle Use as directed to inject insulin 5 times daily  Qty: 100 " "each, Refills: 11      !! pen needle, diabetic 31 gauge x 3/16" Ndle Use as directed with insulin once daily  Qty: 100 each, Refills: 0      !! prednisoLONE acetate (PRED FORTE) 1 % DrpS Place 1 drop into the left eye 2 (two) times daily.  Qty: 5 mL, Refills: 3      !! prednisoLONE acetate (PRED FORTE) 1 % DrpS Place 1 drop in left eye 4 times daily for 5 days  Qty: 5 mL, Refills: 0      predniSONE (DELTASONE) 20 MG tablet take 3 tablets Oral Daily,x30 day(s)  Qty: 90 tablet, Refills: 0      sevelamer carbonate (RENVELA) 800 mg Tab Take 1 tablet (800 mg total) by mouth 3 (three) times daily with meals.  Qty: 180 tablet, Refills: 11      sucralfate (CARAFATE) 100 mg/mL suspension Take 10 mL 4 times a day by oral route before meals for 30 days.  Qty: 1200 mL, Refills: 1      tobramycin-dexAMETHasone 0.3-0.1% (TOBRADEX) 0.3-0.1 % DrpS 1 drop Eye-Left every 6 hours for 5 day(s)  Qty: 5 mL, Refills: 0       !! - Potential duplicate medications found. Please discuss with provider.          Orders Placed This Encounter   Procedures    CT Abdomen Pelvis  Without Contrast    CBC Auto Differential    Comprehensive Metabolic Panel    Lipase    Vital signs    Chlorhexidine Bath    Chlorohexidine Gluconate Bath    Chlorhexidine Bath    Chlorohexidine Gluconate Bath    EKG 12-lead    Type & Screen    Insert peripheral IV    Prepare patient for dialysis    Hemodialysis inpatient If "per protocol" is selected for one or more ingredients (K+, Ca++, Na+, Bicarb) for the dialysate bath solution, select the hyperlink for the protocol instructions.    Prepare patient for dialysis    Hemodialysis inpatient If "per protocol" is selected for one or more ingredients (K+, Ca++, Na+, Bicarb) for the dialysate bath solution, select the hyperlink for the protocol instructions.    Possible Hospitalization    Prepare RBC 2 Units; Hgb < 6       Imaging Results              CT Abdomen Pelvis  Without Contrast (Final result)  Result time 05/11/24 " 16:10:42      Final result by Sunshine Awan MD (05/11/24 16:10:42)                   Impression:      Small amount of ascites has become apparent since the prior exam.  Pericardial effusion appears mildly decreased and decrease of the bibasilar ground-glass opacification and the diffuse body wall fat stranding.  Cardiomegaly, atrophied kidneys and renal vascular calcifications as before.      Electronically signed by: Sunshine Awan MD  Date:    05/11/2024  Time:    16:10               Narrative:    EXAMINATION:  CT ABDOMEN PELVIS WITHOUT CONTRAST    CLINICAL HISTORY:  Epigastric pain;Nausea/vomiting;    TECHNIQUE:  Low dose axial images, sagittal and coronal reformations were obtained from the lung bases to the pubic symphysis.  There are no    COMPARISON:  02/06/2024    FINDINGS:  Visualized lung bases; cardiomegaly.  Pericardial effusion measuring to 1.3 cm in diameter.  Appears smaller than what was seen the prior exam.  Bibasilar patchy and strandy opacification suggesting atelectasis and with also probable so mild ground-glass opacification of image degradation from motion but with ground-glass opacification decreased compared to the prior exam diffuse subcutaneous fat stranding also appearing mildly decreased.  Very small volume of ascites which is increased.    Liver, spleen appearing mildly but unchanged with no masses seen.  Adrenal glands unremarkable.  Pancreas appears mildly atrophied.  Abdominal aorta no aneurysm.  Kidney small with no hydronephrosis.  Extensive calcifications including renal vascular calcifications.  No free intraperitoneal air.  No appreciable bowel wall thickening.  Cholecystectomy clips.  No biliary duct dilatation    Urinary bladder decompressed at the time of the exam, as visualized unremarkable appearance    Reproductive organs; vascular calcifications with uterus and adnexal region otherwise essentially unremarkable appearance for the patient's age.  Typically better  evaluate by ultrasound which could be obtained if indicated clinically.                                      ED Course as of 05/11/24 1653   Sat May 11, 2024   1450 EKG:  NSR, rate of 69, normal intervals except prolonged QTc at 540 ms.  Normal axis.  Peaked T-waves with no QRS widening, possibly associated with hyperkalemia. There are no acute ST or T wave changes suggestive of acute ischemia or infarction.  (Independently interpreted by me) [MR]   1455 D/w Dr. Benitez re: need for dialysis and transfusion. I will obtain written consent.  Dialysis planned.  He recommends admit to trend hemoglobin after dialysis with transfusion to ensure no alternative source of anemia/recurrent decline and f/u electrolyte abnormalities.  Rec give IV calcium x 1 now with no shift as he plans on arranging dialysis shortly. [MR]   1457 R [MR]      ED Course User Index  [MR] Kaushik Patton MD       MDM:    34 y.o. female with left upper quadrant abdominal pain and vomiting.  Broad workup undertaken to assess potential etiologies with prior history notable for extensive GI issues including gastroparesis related to diabetes.  This certainly could be another exacerbation of upper GI issues including gastroparesis.  Low suspicion for other emergent process such as perforated viscus, splenic abscess, obstructive uropathy with CT ordered.  Other lab sent to assess for end-organ dysfunction such as electrolyte derangement, particularly with poor recent dialysis compliance.  Lungs are clear to auscultation with no hypoxia here.  She has no urinary complaints in his relatively anuric.  I doubt UTI or pyelonephritis.  Acetaminophen and GI cocktail ordered for initial treatment with sublingual ondansetron.      Workup significant for worsening anemia, possibly secondary to noncompliance with renal disease.  No history to support GI bleed at present.  This may be trended.  She will obtain transfusion with dialysis I have arranged with  her nephrologist.  She also has hyperkalemia likely secondary to dialysis noncompliance with some peaked T-waves without QRS widening.  IV calcium done with anticipation of dialysis.  We will observe following dialysis to ensure stable hemoglobin and corrected electrolytes.  Symptoms are well controlled here with antiemetic along with GI cocktail and acetaminophen.  There is no sign of other acute process on CT requiring emergent intervention.  I have discussed with hospital medicine who will assume care.    Diagnoses:    1. Vomiting   2.  Left upper quadrant pain  3.  Dialysis noncompliance   4. Hyperkalemia   5. Anemia       Kaushik Patton MD  05/11/24 8025

## 2024-05-11 NOTE — ASSESSMENT & PLAN NOTE
Patient's anemia is currently uncontrolled. Has received 2 units of PRBCs on today during dialysis treatment . Etiology likely d/t  unknown.  Current CBC reviewed-   Lab Results   Component Value Date    HGB 5.1 (LL) 05/11/2024    HCT 15.8 (LL) 05/11/2024     Monitor serial CBC and transfuse if patient becomes hemodynamically unstable, symptomatic or H/H drops below 7/21.

## 2024-05-11 NOTE — ASSESSMENT & PLAN NOTE
"Patient's FSGs are uncontrolled due to hyperglycemia on current medication regimen.  Last A1c reviewed-   Lab Results   Component Value Date    HGBA1C 5.8 08/01/2023     Most recent fingerstick glucose reviewed- No results for input(s): "POCTGLUCOSE" in the last 24 hours.  Current correctional scale  Medium  Maintain anti-hyperglycemic dose as follows-   Antihyperglycemics (From admission, onward)      Start     Stop Route Frequency Ordered    05/11/24 1918  insulin aspart U-100 pen 1-10 Units         -- SubQ Before meals & nightly PRN 05/11/24 1820          Hold Oral hypoglycemics while patient is in the hospital.  "

## 2024-05-11 NOTE — CONSULTS
Nephrology Consult Note        Patient Name: Tabby Howard  MRN: [id number]    Patient Class: Emergency   Admission Date: 2024  Length of Stay: 0 days  Date of Service: 2024    Attending Physician: Kaushik Patton, *  Primary Care Provider: Melony Kim NP    Reason for Consult: esrd    SUBJECTIVE:     HPI: 34Fwith hx DM, HFpEF, HTN, ESRD on HD, gastroparesis, multiple prior surgeries including cholex,  presenting with multiple days of left upper quadrant abdominal pain. Seen at an outside ED 2 days prior with limited notes available from visit. She did have left upper quadrant pain at that time similar to present. Patient describes constant pain in the left upper quadrant accompanied by nonbilious, nonbloody vomiting. She has extensive GI history including gastroparesis with similar presentations for same. She denies change in bowel movements, blood in the stools, dark stools. No recent fever. No hematemesis. No associated chest pain or dyspnea. She is anuric and does not make urine. She last attended dialysis Tuesday and has missed the last 2 sessions. Her K is 6.6 and Hgb 5. She will need emergency dialysis and transfusion of 2 PRBC.    Seen on HD, tolerating well.    Past Medical History:   Diagnosis Date    ESRD on hemodialysis 2022    Gastritis 2022    EGD was 21    Gastroparesis 2022    has not had Emptying study    Heart failure with preserved ejection fraction 2022    EF 55% on 3/22    History of supraventricular tachycardia     Hyperkalemia 2022    Hypertensive emergency 2022    Sickle cell trait 2022    Type 2 diabetes mellitus      Past Surgical History:   Procedure Laterality Date     SECTION      x 3    COLONOSCOPY      COLONOSCOPY N/A 2022    Procedure: COLONOSCOPY;  Surgeon: Jagdeep Cedeno MD;  Location: [facility];  Service: Endoscopy;  Laterality: N/A;    DESTRUCTION, CILIARY BODY, USING LASER Left 3/8/2024     Procedure: Cyclophotocoagulation G-probe, retrobulbar chlorpromazine left eye;  Surgeon: Fidel Wise MD;  Location: [address];  Service: Ophthalmology;  Laterality: Left;    ENDOSCOPIC ULTRASOUND OF UPPER GASTROINTESTINAL TRACT N/A 12/16/2023    Procedure: ULTRASOUND, UPPER GI TRACT, ENDOSCOPIC;  Surgeon: Micky Paredes III, MD;  Location: [facility] ENDO;  Service: Endoscopy;  Laterality: N/A;    ESOPHAGOGASTRODUODENOSCOPY N/A 10/18/2019    Procedure: ESOPHAGOGASTRODUODENOSCOPY (EGD);  Surgeon: Gianluca Mendez MD;  Location: [facility] ENDO;  Service: Endoscopy;  Laterality: N/A;    ESOPHAGOGASTRODUODENOSCOPY N/A 08/24/2022    Procedure: EGD (ESOPHAGOGASTRODUODENOSCOPY);  Surgeon: Micky Paredes III, MD;  Location: [facility];  Service: Endoscopy;  Laterality: N/A;    ESOPHAGOGASTRODUODENOSCOPY N/A 12/5/2022    Procedure: EGD (ESOPHAGOGASTRODUODENOSCOPY);  Surgeon: Marcelo Zhong MD;  Location: [facility] ENDO;  Service: Endoscopy;  Laterality: N/A;    INSERTION, CATHETER, TUNNELED N/A 6/17/2023    Procedure: Insertion,catheter,tunneled;  Surgeon: Carlos Thurman Jr., MD;  Location: [facility] OR;  Service: General;  Laterality: N/A;    LAPAROSCOPIC CHOLECYSTECTOMY N/A 07/30/2022    Procedure: CHOLECYSTECTOMY, LAPAROSCOPIC;  Surgeon: Grey Perez MD;  Location: [facility] OR;  Service: General;  Laterality: N/A;    PLACEMENT OF DUAL-LUMEN VASCULAR CATHETER Left 07/12/2022    Procedure: INSERTION-CATHETER-JOSEPH;  Surgeon: Dionte Gan MD;  Location: [facility] OR;  Service: General;  Laterality: Left;    PLACEMENT OF DUAL-LUMEN VASCULAR CATHETER Right 07/26/2022    Procedure: INSERTION-CATHETER-Hemosplit;  Surgeon: Dionte Gan MD;  Location: [facility] OR;  Service: General;  Laterality: Right;     Family History   Problem Relation Name Age of Onset    Diabetes Mother      Diabetes Father       Social History     Tobacco Use    Smoking status: Never    Smokeless tobacco: Never   Substance Use Topics    Alcohol  use: Not Currently    Drug use: No       Review of patient's allergies indicates:   Allergen Reactions    Penicillins Hives       Outpatient meds:  No current facility-administered medications on file prior to encounter.     Current Outpatient Medications on File Prior to Encounter   Medication Sig Dispense Refill    amLODIPine (NORVASC) 5 MG tablet Take 1 tablet (5 mg total) by mouth once daily. 30 tablet 11    amLODIPine (NORVASC) 5 MG tablet take 2 tablets Oral Daily 60 tablet 0    apixaban (ELIQUIS) 5 mg Tab Take 1 tablet (5 mg total) by mouth 2 (two) times daily. Patient w/ thrombocytopenia <50, so held during admission, follow-up with hematologist about restarting 180 tablet 1    atropine 1% (ISOPTO ATROPINE) 1 % Drop Place 1 drop into the left eye 2 (two) times a day. 15 mL 3    blood sugar diagnostic (TRUE METRIX GLUCOSE TEST STRIP) Strp Use 1 strip to check blood glucose three times daily 100 each 11    blood-glucose meter (TRUE METRIX GLUCOSE METER) Misc Use to check blood glucose 1 each 0    blood-glucose meter,continuous (DEXCOM ) Misc USE WITH SENSORS 1 each 0    blood-glucose sensor Heather CHANGE EVERY 10 DAYS 3 each 0    brimonidine 0.15 % OPTH DROP (ALPHAGAN) 0.15 % ophthalmic solution Place 1 drop into both eyes 3 (three) times daily. 15 mL 3    busPIRone (BUSPAR) 10 MG tablet Take 1 tablet (10 mg total) by mouth 3 (three) times daily as needed (anxiety). 60 tablet 5    carvediloL (COREG) 25 MG tablet take 1 tablet Oral 2 times daily 60 tablet 0    cetirizine (ZYRTEC) 10 MG tablet Take 1 tablet every day by oral route. 30 tablet 0    dorzolamide-timolol 2-0.5% (COSOPT) 22.3-6.8 mg/mL ophthalmic solution Place 1 drop into the left eye every 12 (twelve) hours. 10 mL 6    dorzolamide-timolol 2-0.5% (COSOPT) 22.3-6.8 mg/mL ophthalmic solution place 1 drop in left eye twice a day  for 30 days 10 mL 0    FLUoxetine 20 MG capsule Take 1 capsule every day by oral route for 90 days. 90 capsule 1     "FLUoxetine 20 MG capsule take 2 capsules Oral Daily 60 capsule 0    FLUoxetine 20 MG capsule Take 1 capsule (20 mg total) by mouth once daily, for Mood.. 90 capsule 2    fluticasone propionate (FLONASE ALLERGY RELIEF) 50 mcg/actuation nasal spray Ledbetter 1 spray every day by intranasal route. 16 g 2    gabapentin (NEURONTIN) 300 MG capsule Take 2 capsules twice a day by oral route for 90 days. 360 capsule 1    hydrALAZINE (APRESOLINE) 100 MG tablet Take 1 tablet (100 mg total) by mouth 2 (two) times daily. 180 tablet 1    hydrALAZINE (APRESOLINE) 25 MG tablet take 1 tablet by mouth every 8 hours 90 tablet 0    insulin detemir U-100, Levemir, (LEVEMIR FLEXTOUCH U100 INSULIN) 100 unit/mL (3 mL) InPn pen Inject 22 Units into the skin every evening. Patient not needing insulin in-patient. Follow-up with PCP for hospital follow-up and restarting the medication. Or restart medication if glucose >200 consistently resume home insulin regimen. Or if glucose is persistently less than 100, decrease by 5 units until following up with PCP 15 mL 1    insulin glargine U-100, Lantus, (LANTUS) 100 unit/mL injection inject 13 unit subcutaneously 2 times daily 10 mL 0    isosorbide mononitrate (IMDUR) 30 MG 24 hr tablet Take 1 tablet (30 mg total) by mouth once daily. 90 tablet 1    lancets (TRUEPLUS LANCETS) 33 gauge Misc Use 1 to check blood glucose three times daily 100 each 11    lancets 33 gauge Misc Use to test twice daily 100 each 5    levETIRAcetam (KEPPRA) 500 MG Tab Take 1 tablet (500 mg total) by mouth 2 (two) times daily. 60 tablet 11    ondansetron (ZOFRAN-ODT) 4 MG TbDL Place 1 tablet (4 mg) on or under the tongue every 8 hours for 10 days. 24 tablet 2    oxyCODONE-acetaminophen (PERCOCET)  mg per tablet Take 1 tablet by mouth every 4 (four) hours as needed for Pain. 42 tablet 0    pen needle, diabetic 31 gauge x 3/16" Ndle Use as directed to inject insulin 5 times daily 100 each 11    pen needle, diabetic 31 gauge x " "3/16" Ndle Use as directed with insulin once daily 100 each 0    prednisoLONE acetate (PRED FORTE) 1 % DrpS Place 1 drop into the left eye 2 (two) times daily. 5 mL 3    prednisoLONE acetate (PRED FORTE) 1 % DrpS Place 1 drop in left eye 4 times daily for 5 days 5 mL 0    predniSONE (DELTASONE) 20 MG tablet take 3 tablets Oral Daily,x30 day(s) 90 tablet 0    sevelamer carbonate (RENVELA) 800 mg Tab Take 1 tablet (800 mg total) by mouth 3 (three) times daily with meals. 180 tablet 11    sucralfate (CARAFATE) 100 mg/mL suspension Take 10 mL 4 times a day by oral route before meals for 30 days. 1200 mL 1    tobramycin-dexAMETHasone 0.3-0.1% (TOBRADEX) 0.3-0.1 % DrpS 1 drop Eye-Left every 6 hours for 5 day(s) 5 mL 0    [DISCONTINUED] atenoloL (TENORMIN) 50 MG tablet Take 1 tablet (50 mg total) by mouth every other day. 45 tablet 3    [DISCONTINUED] omeprazole (PRILOSEC) 20 MG capsule Take 2 capsules (40 mg total) by mouth once daily. for 10 days 20 capsule 0       Scheduled meds:   mupirocin   Nasal BID       Infusions:      PRN meds:    Current Facility-Administered Medications:     0.9%  NaCl infusion (for blood administration), , Intravenous, Q24H PRN    Review of Systems:  Weak and sick looking, unable to talk much due to pain.    OBJECTIVE:     Vital Signs and IO:  Temp:  [98.2 °F (36.8 °C)]   Pulse:  [73]   Resp:  [20]   BP: (115)/(51)   SpO2:  [98 %]   No intake/output data recorded.  Wt Readings from Last 5 Encounters:   05/11/24 53.1 kg (117 lb)   03/30/24 53.1 kg (117 lb)   03/08/24 53.1 kg (117 lb)   03/06/24 52.6 kg (116 lb)   02/28/24 52.7 kg (116 lb 2.9 oz)     Body mass index is 21.4 kg/m².    Physical Exam  Constitutional:       General: Patient is in some acute distress.     Appearance: Patient is well-developed. She is not diaphoretic.   HENT:      Head: Normocephalic and atraumatic.      Mouth/Throat: Mucous membranes are moist.   Eyes:      General: No scleral icterus.     Pupils: Pupils are equal, " "round, and reactive to light.   Cardiovascular:      Rate and Rhythm: Normal rate and regular rhythm.   Pulmonary:      Effort: Pulmonary effort is normal. No respiratory distress.      Breath sounds: No stridor.   Abdominal:      General: There is no distension.      Palpations: Abdomen is soft.   Musculoskeletal:         General: No deformity. Normal range of motion.      Cervical back: Neck supple.   Skin:     General: Skin is warm and dry.      Findings: No rash present. No erythema.   Neurological:      Mental Status: Patient is alert and oriented to person, place, and time.      Cranial Nerves: No cranial nerve deficit.   Psychiatric:         Behavior: Behavior normal.     Laboratory:  Recent Labs   Lab 05/11/24  1341      K 6.6*      CO2 17*   BUN 94*   CREATININE 9.5*   *       Recent Labs   Lab 05/11/24  1341   CALCIUM 7.4*   ALBUMIN 3.7       Recent Labs   Lab 07/08/22  0516   PTH, Intact 289.8 H       No results for input(s): "POCTGLUCOSE" in the last 168 hours.    Recent Labs   Lab 12/23/22  1413 03/03/23  0547 08/01/23  0813   Hemoglobin A1C 7.5 H 5.8 5.8       Recent Labs   Lab 05/11/24  1341   WBC 11.11   HGB 5.1*   HCT 15.8*      MCV 95   MCHC 32.3   MONO 9.6  1.1*   EOSINOPHIL 0.1       Recent Labs   Lab 05/11/24  1341   BILITOT 2.6*   PROT 6.6   ALBUMIN 3.7   ALKPHOS 227*   ALT 69*   AST 56*       Recent Labs   Lab 03/16/22  1848 05/15/22  2022 02/05/23  0156 09/19/23  1422 10/13/23  1001   Color, UA Pale Yellow   < > Yellow Yellow Yellow   Appearance, UA  --    < > Clear Clear Clear   pH, UA  --    < > >8.0 A >8.0 A 8.0   Specific Gravity, UA  --    < > 1.020 1.020 1.015   Protein, UA  --    < > 3+ A 4+ 4+   Glucose, UA 50 mg/dL A   < > 2+ A 4+ A 3+ A   Ketones, UA  --    < > 1+ A Trace A Trace A   Urobilinogen, UA Normal   < > Negative Negative Negative   Bilirubin (UA) Negative   < > 2+ A Negative 1+ A   Occult Blood UA  --    < > 3+ A 2+ A Negative   Blood, UA 0.03 " mg/dL A   < >  --   --   --    Nitrite, UA  --    < > Negative Negative Negative   RBC, UA  --    < > 15 H 17 H 8 H   WBC, UA  --    < > 1 2 5   Bacteria  --    < > Rare Negative Negative   Mucus, UA Rare A  --   --   --   --    Hyaline Casts, UA  --    < > 2 A 1 2 A    < > = values in this interval not displayed.       Recent Labs   Lab 10/03/23  2105 10/04/23  0438 10/15/23  0353 02/24/24  1855   POC PH 7.290 LL 7.305 L 7.239 LL  --    POC PCO2 45.2 H 43.6 37.8  --    POC HCO3 21.7 L 21.7 L 16.1 L  --    POC PO2 110 H 92 37 LL  --    POC SATURATED O2 98 96 60  --    POC BE -5 -5 -11  --    Sample ARTERIAL ARTERIAL VENOUS PIETER       Microbiology Results (last 7 days)       ** No results found for the last 168 hours. **            ASSESSMENT/PLAN:     ESRD on HD TTS? via AVF  Hyperkalemia  Next HD per schedule.  Continue current dialysis prescription.  Renal diet - low K, low phos.  No IVs or BP checks on access and/or non-dominant arm.    Anemia of CKD + acute anemia  Hgb and HCT are low - will transfuse 2 PRBC with HD and more later as needed  Will provide SHELLEY and/or IV iron PRN.    MBD / Secondary HPT  Ca, Phos, PTH and vitamin D levels are acceptable.   Phos binders, vitamin D and analogues, calcimimetics will be given as needed.    HTN  BP seem controlled.   Tolerate asymptomatic HTN up to -160.  Continue home meds.  Low sodium diet.    Thank you for allowing us to participate in the care of your patient!   We will follow the patient and provide recommendations as needed.    Patient care time was spent personally by me on the following activities:     Obtaining a history.  Examination of patient.  Providing medical care at the patients bedside.  Developing a treatment plan with patient or surrogate and bedside caregivers.  Ordering and reviewing laboratory studies, radiographic studies, pulse oximetry.  Ordering and performing treatments and interventions.  Evaluation of patient's response to  treatment.  Discussions with consultants while on the unit and immediately available to the patient.  Re-evaluation of the patient's condition.  Documentation in the medical record.     Mando Benitez MD    [facility]  [address] [place] [id number]    [phone] - tel  [phone] - fax    5/11/2024

## 2024-05-11 NOTE — HPI
Tabby Howard is a 34-year-old female who presented to the emergency room for evaluation of left upper quadrant pain for several days.  She reports left upper quadrant pain onset approximately 3 days ago.  She was seen at outside emergency room 2 days ago ([facility]). Limited access to outside facilities medical records. I did see where her potassium at that visit was 5.7. She describes the pain as an intense sensation.  Melena, hematemesis, hematochezia.  No aggravating alleviating factors.  She denies any fever or chills.  No known sick contacts or travel.  She has a complex medical history including diabetes, gastroparesis, end-stage renal disease, sickle cell, hypertension, deep vein thrombosis, anemia, GERD, CHF, and seizures.  She missed her last 2 scheduled dialysis sessions.  ER workup today:  CBC with H&H of 5/15.  CMP with potassium of 6.6, glucose 230, BUN 94, creatinine 9.5, bilirubin 2.6.  CT the abdomen and pelvis was negative for any acute findings.  ER physician spoke with Nephrology who recommended dialysis now and will transfusing 2 units of packed red blood cells.  Patient be admitted to Hospital Medicine for treatment and management.  Patient was receiving dialysis and blood transfusions at the time my exam.

## 2024-05-11 NOTE — H&P
[facility]  History & Physical    Patient Name: Tabby Howard  MRN: [id number]  Patient Class: IP- Inpatient  Admission Date: 5/11/2024  Attending Physician: Linda Cueto MD   Primary Care Provider: Melony Kim NP         Patient information was obtained from patient, past medical records, and ER records.     Subjective:     Principal Problem:ESRD (end stage renal disease)    Chief Complaint:   Chief Complaint   Patient presents with    Abdominal Pain     Pt comes in via ems with c/o abdominal pain starting yesterday         HPI: Tabby Howard is a 34-year-old female who presented to the emergency room for evaluation of left upper quadrant pain for several days.  She reports left upper quadrant pain onset approximately 3 days ago.  She was seen at outside emergency room 2 days ago ([facility]).  Limited access to outside facilities medical records.  I did see where her potassium at that visit was 5.7.  She describes the pain as an intense sensation.  Melena, hematemesis, hematochezia.  No aggravating alleviating factors.  She denies any fever or chills.  No known sick contacts or travel.  She has a complex medical history including diabetes, gastroparesis, end-stage renal disease, sickle cell, hypertension, deep vein thrombosis, anemia, GERD, CHF, and seizures.  She missed her last 2 scheduled dialysis sessions.  ER workup today:  CBC with H&H of 5/15.  CMP with potassium of 6.6, glucose 230, BUN 94, creatinine 9.5, bilirubin 2.6.  CT the abdomen and pelvis was negative for any acute findings.  ER physician spoke with Nephrology who recommended dialysis now and will transfusing 2 units of packed red blood cells.  Patient be admitted to Hospital Medicine for treatment and management.  Patient was receiving dialysis and blood transfusions at the time my exam.              Past Medical History:   Diagnosis Date    ESRD on hemodialysis 04/12/2022    Gastritis  2022    EGD was 22    Gastroparesis 2022    has not had Emptying study    Heart failure with preserved ejection fraction 2022    EF 55% on 3/22    History of supraventricular tachycardia     Hyperkalemia 2022    Hypertensive emergency 2022    Sickle cell trait 2022    Type 2 diabetes mellitus        Past Surgical History:   Procedure Laterality Date     SECTION      x 3    COLONOSCOPY      COLONOSCOPY N/A 2022    Procedure: COLONOSCOPY;  Surgeon: Jagdeep Cedeno MD;  Location: [facility];  Service: Endoscopy;  Laterality: N/A;    DESTRUCTION, CILIARY BODY, USING LASER Left 3/8/2024    Procedure: Cyclophotocoagulation G-probe, retrobulbar chlorpromazine left eye;  Surgeon: Fidel Wise MD;  Location: [address];  Service: Ophthalmology;  Laterality: Left;    ENDOSCOPIC ULTRASOUND OF UPPER GASTROINTESTINAL TRACT N/A 2023    Procedure: ULTRASOUND, UPPER GI TRACT, ENDOSCOPIC;  Surgeon: Micky Paredes III, MD;  Location: [facility];  Service: Endoscopy;  Laterality: N/A;    ESOPHAGOGASTRODUODENOSCOPY N/A 10/18/2019    Procedure: ESOPHAGOGASTRODUODENOSCOPY (EGD);  Surgeon: Gianluca Mendez MD;  Location: [facility];  Service: Endoscopy;  Laterality: N/A;    ESOPHAGOGASTRODUODENOSCOPY N/A 2022    Procedure: EGD (ESOPHAGOGASTRODUODENOSCOPY);  Surgeon: Micky Paredes III, MD;  Location: [facility];  Service: Endoscopy;  Laterality: N/A;    ESOPHAGOGASTRODUODENOSCOPY N/A 2022    Procedure: EGD (ESOPHAGOGASTRODUODENOSCOPY);  Surgeon: Marcelo Zhong MD;  Location: [facility] ENDO;  Service: Endoscopy;  Laterality: N/A;    INSERTION, CATHETER, TUNNELED N/A 2023    Procedure: Insertion,catheter,tunneled;  Surgeon: Carlos Thurman Jr., MD;  Location: [facility];  Service: General;  Laterality: N/A;    LAPAROSCOPIC CHOLECYSTECTOMY N/A 2022    Procedure: CHOLECYSTECTOMY, LAPAROSCOPIC;  Surgeon: Grey Perez MD;  Location: [facility] OR;   Service: General;  Laterality: N/A;    PLACEMENT OF DUAL-LUMEN VASCULAR CATHETER Left 07/12/2022    Procedure: INSERTION-CATHETER-JOSEPH;  Surgeon: Dionte Gan MD;  Location: [facility] OR;  Service: General;  Laterality: Left;    PLACEMENT OF DUAL-LUMEN VASCULAR CATHETER Right 07/26/2022    Procedure: INSERTION-CATHETER-Hemosplit;  Surgeon: Dionte Gan MD;  Location: [facility] OR;  Service: General;  Laterality: Right;       Review of patient's allergies indicates:   Allergen Reactions    Penicillins Hives       No current facility-administered medications on file prior to encounter.     Current Outpatient Medications on File Prior to Encounter   Medication Sig    amLODIPine (NORVASC) 5 MG tablet Take 1 tablet (5 mg total) by mouth once daily.    amLODIPine (NORVASC) 5 MG tablet take 2 tablets Oral Daily    apixaban (ELIQUIS) 5 mg Tab Take 1 tablet (5 mg total) by mouth 2 (two) times daily. Patient w/ thrombocytopenia <50, so held during admission, follow-up with hematologist about restarting    atropine 1% (ISOPTO ATROPINE) 1 % Drop Place 1 drop into the left eye 2 (two) times a day.    blood sugar diagnostic (TRUE METRIX GLUCOSE TEST STRIP) Strp Use 1 strip to check blood glucose three times daily    blood-glucose meter (TRUE METRIX GLUCOSE METER) Misc Use to check blood glucose    blood-glucose meter,continuous (DEXCOM ) Misc USE WITH SENSORS    blood-glucose sensor Heather CHANGE EVERY 10 DAYS    brimonidine 0.15 % OPTH DROP (ALPHAGAN) 0.15 % ophthalmic solution Place 1 drop into both eyes 3 (three) times daily.    busPIRone (BUSPAR) 10 MG tablet Take 1 tablet (10 mg total) by mouth 3 (three) times daily as needed (anxiety).    carvediloL (COREG) 25 MG tablet take 1 tablet Oral 2 times daily    cetirizine (ZYRTEC) 10 MG tablet Take 1 tablet every day by oral route.    dorzolamide-timolol 2-0.5% (COSOPT) 22.3-6.8 mg/mL ophthalmic solution Place 1 drop into the left eye every 12 (twelve) hours.     "dorzolamide-timolol 2-0.5% (COSOPT) 22.3-6.8 mg/mL ophthalmic solution place 1 drop in left eye twice a day  for 30 days    FLUoxetine 20 MG capsule Take 1 capsule every day by oral route for 90 days.    FLUoxetine 20 MG capsule take 2 capsules Oral Daily    FLUoxetine 20 MG capsule Take 1 capsule (20 mg total) by mouth once daily, for Mood..    fluticasone propionate (FLONASE ALLERGY RELIEF) 50 mcg/actuation nasal spray [place]au 1 spray every day by intranasal route.    gabapentin (NEURONTIN) 300 MG capsule Take 2 capsules twice a day by oral route for 90 days.    hydrALAZINE (APRESOLINE) 100 MG tablet Take 1 tablet (100 mg total) by mouth 2 (two) times daily.    hydrALAZINE (APRESOLINE) 25 MG tablet take 1 tablet by mouth every 8 hours    insulin detemir U-100, Levemir, (LEVEMIR FLEXTOUCH U100 INSULIN) 100 unit/mL (3 mL) InPn pen Inject 22 Units into the skin every evening. Patient not needing insulin in-patient. Follow-up with PCP for hospital follow-up and restarting the medication. Or restart medication if glucose >200 consistently resume home insulin regimen. Or if glucose is persistently less than 100, decrease by 5 units until following up with PCP    insulin glargine U-100, Lantus, (LANTUS) 100 unit/mL injection inject 13 unit subcutaneously 2 times daily    isosorbide mononitrate (IMDUR) 30 MG 24 hr tablet Take 1 tablet (30 mg total) by mouth once daily.    lancets (TRUEPLUS LANCETS) 33 gauge Misc Use 1 to check blood glucose three times daily    lancets 33 gauge Misc Use to test twice daily    levETIRAcetam (KEPPRA) 500 MG Tab Take 1 tablet (500 mg total) by mouth 2 (two) times daily.    ondansetron (ZOFRAN-ODT) 4 MG TbDL Place 1 tablet (4 mg) on or under the tongue every 8 hours for 10 days.    oxyCODONE-acetaminophen (PERCOCET)  mg per tablet Take 1 tablet by mouth every 4 (four) hours as needed for Pain.    pen needle, diabetic 31 gauge x 3/16" Ndle Use as directed to inject insulin 5 times daily    " "pen needle, diabetic 31 gauge x 3/16" Ndle Use as directed with insulin once daily    prednisoLONE acetate (PRED FORTE) 1 % DrpS Place 1 drop into the left eye 2 (two) times daily.    prednisoLONE acetate (PRED FORTE) 1 % DrpS Place 1 drop in left eye 4 times daily for 5 days    predniSONE (DELTASONE) 20 MG tablet take 3 tablets Oral Daily,x30 day(s)    sevelamer carbonate (RENVELA) 800 mg Tab Take 1 tablet (800 mg total) by mouth 3 (three) times daily with meals.    sucralfate (CARAFATE) 100 mg/mL suspension Take 10 mL 4 times a day by oral route before meals for 30 days.    tobramycin-dexAMETHasone 0.3-0.1% (TOBRADEX) 0.3-0.1 % DrpS 1 drop Eye-Left every 6 hours for 5 day(s)    [DISCONTINUED] atenoloL (TENORMIN) 50 MG tablet Take 1 tablet (50 mg total) by mouth every other day.    [DISCONTINUED] omeprazole (PRILOSEC) 20 MG capsule Take 2 capsules (40 mg total) by mouth once daily. for 10 days     Family History       Problem Relation (Age of Onset)    Diabetes Mother, Father          Tobacco Use    Smoking status: Never    Smokeless tobacco: Never   Substance and Sexual Activity    Alcohol use: Not Currently    Drug use: No    Sexual activity: Not Currently     Partners: Male     Birth control/protection: I.U.D.     Review of Systems   Constitutional:  Positive for activity change. Negative for chills, diaphoresis and fever.   HENT:  Negative for congestion, nosebleeds and tinnitus.    Eyes:  Negative for photophobia and visual disturbance.   Respiratory:  Negative for cough, chest tightness, shortness of breath and wheezing.    Cardiovascular:  Negative for chest pain, palpitations and leg swelling.   Gastrointestinal:  Positive for abdominal distention, abdominal pain, nausea and vomiting. Negative for constipation and diarrhea.   Endocrine: Negative for cold intolerance and heat intolerance.   Genitourinary:  Negative for difficulty urinating, dysuria, frequency, hematuria and urgency.   Musculoskeletal:  " Negative for arthralgias, back pain and myalgias.   Skin:  Negative for pallor, rash and wound.   Allergic/Immunologic: Negative for immunocompromised state.   Neurological:  Negative for dizziness, tremors, facial asymmetry, speech difficulty and weakness.   Hematological:  Negative for adenopathy. Does not bruise/bleed easily.   Psychiatric/Behavioral:  Negative for confusion and sleep disturbance. The patient is not nervous/anxious.      Objective:     Vital Signs (Most Recent):  Temp: 96.7 °F (35.9 °C) (05/11/24 1730)  Pulse: 77 (05/11/24 1815)  Resp: 20 (05/11/24 1630)  BP: (!) 209/116 (05/11/24 1800)  SpO2: 100 % (05/11/24 1800) Vital Signs (24h Range):  Temp:  [96.6 °F (35.9 °C)-98.2 °F (36.8 °C)] 96.7 °F (35.9 °C)  Pulse:  [70-77] 77  Resp:  [20] 20  SpO2:  [98 %-100 %] 100 %  BP: (115-209)/() 209/116     Weight: 53.1 kg (117 lb)  Body mass index is 21.4 kg/m².     Physical Exam  Vitals and nursing note reviewed.   Constitutional:       General: She is not in acute distress.     Appearance: She is well-developed. She is not diaphoretic.   HENT:      Head: Normocephalic.      Mouth/Throat:      Mouth: Mucous membranes are dry.   Eyes:      General: No scleral icterus.     Conjunctiva/sclera: Conjunctivae normal.      Pupils: Pupils are equal, round, and reactive to light.   Neck:      Vascular: No JVD.   Cardiovascular:      Rate and Rhythm: Regular rhythm. Tachycardia present.      Heart sounds: Normal heart sounds. No murmur heard.     No friction rub. No gallop.   Pulmonary:      Effort: Pulmonary effort is normal. No respiratory distress.      Breath sounds: Normal breath sounds. No wheezing or rales.   Abdominal:      General: Bowel sounds are normal. There is no distension.      Palpations: Abdomen is soft.      Tenderness: There is abdominal tenderness. There is no guarding or rebound.   Musculoskeletal:         General: No tenderness. Normal range of motion.      Cervical back: Normal range of  "motion and neck supple.   Lymphadenopathy:      Cervical: No cervical adenopathy.   Skin:     General: Skin is warm and dry.      Capillary Refill: Capillary refill takes less than 2 seconds.      Coloration: Skin is not pale.      Findings: No erythema or rash.   Neurological:      Mental Status: She is alert and oriented to person, place, and time.      Cranial Nerves: No cranial nerve deficit.      Sensory: No sensory deficit.      Coordination: Coordination normal.      Deep Tendon Reflexes: Reflexes normal.   Psychiatric:         Behavior: Behavior normal.         Thought Content: Thought content normal.         Judgment: Judgment normal.              CRANIAL NERVES     CN III, IV, VI   Pupils are equal, round, and reactive to light.       Significant Labs: All pertinent labs within the past 24 hours have been reviewed.  CBC:   Recent Labs   Lab 05/11/24  1341   WBC 11.11   HGB 5.1*   HCT 15.8*        CMP:   Recent Labs   Lab 05/11/24  1341      K 6.6*      CO2 17*   *   BUN 94*   CREATININE 9.5*   CALCIUM 7.4*   PROT 6.6   ALBUMIN 3.7   BILITOT 2.6*   ALKPHOS 227*   AST 56*   ALT 69*   ANIONGAP 24*     Cardiac Markers: No results for input(s): "CKMB", "MYOGLOBIN", "BNP", "TROPISTAT" in the last 48 hours.    Significant Imaging: I have reviewed all pertinent imaging results/findings within the past 24 hours.    CT    Impression:     Small amount of ascites has become apparent since the prior exam.  Pericardial effusion appears mildly decreased and decrease of the bibasilar ground-glass opacification and the diffuse body wall fat stranding.  Cardiomegaly, atrophied kidneys and renal vascular calcifications as before.  Assessment/Plan:     * ESRD (end stage renal disease)  Creatine stable for now. BMP reviewed- noted Estimated Creatinine Clearance: 6.5 mL/min (A) (based on SCr of 9.5 mg/dL (H)). according to latest data. Based on current GFR, CKD stage is end stage.  Monitor UOP and " "serial BMP and adjust therapy as needed. Renally dose meds. Avoid nephrotoxic medications and procedures.    Symptomatic anemia  Patient's anemia is currently uncontrolled. Has received 2 units of PRBCs on today during dialysis treatment . Etiology likely d/t  unknown.  Current CBC reviewed-   Lab Results   Component Value Date    HGB 5.1 (LL) 05/11/2024    HCT 15.8 (LL) 05/11/2024     Monitor serial CBC and transfuse if patient becomes hemodynamically unstable, symptomatic or H/H drops below 7/21.    Seizures  Chronic problem  Seizure precautions  Continue Keppra 500 mg p.o. b.i.d.         Diabetes  Patient's FSGs are uncontrolled due to hyperglycemia on current medication regimen.  Last A1c reviewed-   Lab Results   Component Value Date    HGBA1C 5.8 08/01/2023     Most recent fingerstick glucose reviewed- No results for input(s): "POCTGLUCOSE" in the last 24 hours.  Current correctional scale  Medium  Maintain anti-hyperglycemic dose as follows-   Antihyperglycemics (From admission, onward)      Start     Stop Route Frequency Ordered    05/11/24 1918  insulin aspart U-100 pen 1-10 Units         -- SubQ Before meals & nightly PRN 05/11/24 1820          Hold Oral hypoglycemics while patient is in the hospital.    Hyperkalemia  This patient has hyperkalemia which is uncontrolled. We will monitor for arrhythmias with EKG or continuous telemetry. We will treat the hyperkalemia with Potassium Binders, Calcium gluconate, and hemodialysis . The likely etiology of the hyperkalemia is ESRD.  The patients latest potassium has been reviewed and the results are listed below  Recent Labs   Lab 05/11/24  1341   K 6.6*               VTE Risk Mitigation (From admission, onward)           Ordered     Place JOCELYNE hose  Until discontinued         05/11/24 1820     IP VTE HIGH RISK PATIENT  Once         05/11/24 1820     Place sequential compression device  Until discontinued         05/11/24 1820                                "     Varun Dillard NP  Department of Hospital Medicine  Formerly [facility], [facility]

## 2024-05-11 NOTE — ASSESSMENT & PLAN NOTE
This patient has hyperkalemia which is uncontrolled. We will monitor for arrhythmias with EKG or continuous telemetry. We will treat the hyperkalemia with Potassium Binders, Calcium gluconate, and hemodialysis . The likely etiology of the hyperkalemia is ESRD.  The patients latest potassium has been reviewed and the results are listed below  Recent Labs   Lab 05/11/24  1341   K 6.6*

## 2024-05-12 LAB
ALBUMIN SERPL BCP-MCNC: 3.4 G/DL (ref 3.5–5.2)
ALP SERPL-CCNC: 227 U/L (ref 55–135)
ALT SERPL W/O P-5'-P-CCNC: 56 U/L (ref 10–44)
ANION GAP SERPL CALC-SCNC: 16 MMOL/L (ref 8–16)
AST SERPL-CCNC: 35 U/L (ref 10–40)
BASOPHILS # BLD AUTO: 0.07 K/UL (ref 0–0.2)
BASOPHILS NFR BLD: 0.8 % (ref 0–1.9)
BILIRUB SERPL-MCNC: 2.2 MG/DL (ref 0.1–1)
BUN SERPL-MCNC: 41 MG/DL (ref 6–20)
CALCIUM SERPL-MCNC: 9.2 MG/DL (ref 8.7–10.5)
CHLORIDE SERPL-SCNC: 98 MMOL/L (ref 95–110)
CO2 SERPL-SCNC: 25 MMOL/L (ref 23–29)
CREAT SERPL-MCNC: 5.8 MG/DL (ref 0.5–1.4)
DIFFERENTIAL METHOD BLD: ABNORMAL
EOSINOPHIL # BLD AUTO: 0.2 K/UL (ref 0–0.5)
EOSINOPHIL NFR BLD: 1.7 % (ref 0–8)
ERYTHROCYTE [DISTWIDTH] IN BLOOD BY AUTOMATED COUNT: 17.8 % (ref 11.5–14.5)
EST. GFR  (NO RACE VARIABLE): 9 ML/MIN/1.73 M^2
FERRITIN SERPL-MCNC: ABNORMAL NG/ML (ref 20–300)
GLUCOSE SERPL-MCNC: 162 MG/DL (ref 70–110)
GLUCOSE SERPL-MCNC: 190 MG/DL (ref 70–110)
GLUCOSE SERPL-MCNC: 191 MG/DL (ref 70–110)
GLUCOSE SERPL-MCNC: 265 MG/DL (ref 70–110)
GLUCOSE SERPL-MCNC: 392 MG/DL (ref 70–110)
HCT VFR BLD AUTO: 26.5 % (ref 37–48.5)
HGB BLD-MCNC: 9.2 G/DL (ref 12–16)
IMM GRANULOCYTES # BLD AUTO: 0.09 K/UL (ref 0–0.04)
IMM GRANULOCYTES NFR BLD AUTO: 1 % (ref 0–0.5)
IRON SERPL-MCNC: 235 UG/DL (ref 30–160)
LYMPHOCYTES # BLD AUTO: 1.7 K/UL (ref 1–4.8)
LYMPHOCYTES NFR BLD: 20.2 % (ref 18–48)
MAGNESIUM SERPL-MCNC: 2.2 MG/DL (ref 1.6–2.6)
MCH RBC QN AUTO: 30.1 PG (ref 27–31)
MCHC RBC AUTO-ENTMCNC: 34.7 G/DL (ref 32–36)
MCV RBC AUTO: 87 FL (ref 82–98)
MONOCYTES # BLD AUTO: 0.9 K/UL (ref 0.3–1)
MONOCYTES NFR BLD: 10.5 % (ref 4–15)
NEUTROPHILS # BLD AUTO: 5.6 K/UL (ref 1.8–7.7)
NEUTROPHILS NFR BLD: 65.8 % (ref 38–73)
NRBC BLD-RTO: 5 /100 WBC
PHOSPHATE SERPL-MCNC: 5.8 MG/DL (ref 2.7–4.5)
PLATELET # BLD AUTO: 104 K/UL (ref 150–450)
PMV BLD AUTO: 9.6 FL (ref 9.2–12.9)
POTASSIUM SERPL-SCNC: 4.3 MMOL/L (ref 3.5–5.1)
POTASSIUM SERPL-SCNC: 4.5 MMOL/L (ref 3.5–5.1)
PROT SERPL-MCNC: 6.6 G/DL (ref 6–8.4)
RBC # BLD AUTO: 3.06 M/UL (ref 4–5.4)
SATURATED IRON: 106 % (ref 20–50)
SODIUM SERPL-SCNC: 139 MMOL/L (ref 136–145)
TOTAL IRON BINDING CAPACITY: 221 UG/DL (ref 250–450)
TRANSFERRIN SERPL-MCNC: 158 MG/DL (ref 200–375)
WBC # BLD AUTO: 8.58 K/UL (ref 3.9–12.7)

## 2024-05-12 PROCEDURE — 36415 COLL VENOUS BLD VENIPUNCTURE: CPT

## 2024-05-12 PROCEDURE — 96372 THER/PROPH/DIAG INJ SC/IM: CPT | Performed by: FAMILY MEDICINE

## 2024-05-12 PROCEDURE — 21400001 HC TELEMETRY ROOM

## 2024-05-12 PROCEDURE — 63600175 PHARM REV CODE 636 W HCPCS: Performed by: NURSE PRACTITIONER

## 2024-05-12 PROCEDURE — 96372 THER/PROPH/DIAG INJ SC/IM: CPT | Performed by: NURSE PRACTITIONER

## 2024-05-12 PROCEDURE — 83735 ASSAY OF MAGNESIUM: CPT | Performed by: NURSE PRACTITIONER

## 2024-05-12 PROCEDURE — 80053 COMPREHEN METABOLIC PANEL: CPT | Performed by: NURSE PRACTITIONER

## 2024-05-12 PROCEDURE — 99900035 HC TECH TIME PER 15 MIN (STAT)

## 2024-05-12 PROCEDURE — 63600175 PHARM REV CODE 636 W HCPCS: Performed by: FAMILY MEDICINE

## 2024-05-12 PROCEDURE — 85025 COMPLETE CBC W/AUTO DIFF WBC: CPT | Performed by: NURSE PRACTITIONER

## 2024-05-12 PROCEDURE — 99900031 HC PATIENT EDUCATION (STAT)

## 2024-05-12 PROCEDURE — 51798 US URINE CAPACITY MEASURE: CPT

## 2024-05-12 PROCEDURE — 84132 ASSAY OF SERUM POTASSIUM: CPT

## 2024-05-12 PROCEDURE — 94761 N-INVAS EAR/PLS OXIMETRY MLT: CPT

## 2024-05-12 PROCEDURE — 25000003 PHARM REV CODE 250: Performed by: INTERNAL MEDICINE

## 2024-05-12 PROCEDURE — G0378 HOSPITAL OBSERVATION PER HR: HCPCS

## 2024-05-12 PROCEDURE — 36415 COLL VENOUS BLD VENIPUNCTURE: CPT | Performed by: NURSE PRACTITIONER

## 2024-05-12 PROCEDURE — 84100 ASSAY OF PHOSPHORUS: CPT | Performed by: NURSE PRACTITIONER

## 2024-05-12 PROCEDURE — 25000003 PHARM REV CODE 250: Performed by: NURSE PRACTITIONER

## 2024-05-12 PROCEDURE — 94760 N-INVAS EAR/PLS OXIMETRY 1: CPT | Mod: XB

## 2024-05-12 RX ORDER — INSULIN GLARGINE 100 [IU]/ML
10 INJECTION, SOLUTION SUBCUTANEOUS 2 TIMES DAILY
Status: DISCONTINUED | OUTPATIENT
Start: 2024-05-12 | End: 2024-05-13 | Stop reason: HOSPADM

## 2024-05-12 RX ADMIN — OXYCODONE HYDROCHLORIDE 5 MG: 5 TABLET ORAL at 06:05

## 2024-05-12 RX ADMIN — INSULIN ASPART 2 UNITS: 100 INJECTION, SOLUTION INTRAVENOUS; SUBCUTANEOUS at 04:05

## 2024-05-12 RX ADMIN — CARVEDILOL 25 MG: 25 TABLET, FILM COATED ORAL at 08:05

## 2024-05-12 RX ADMIN — MUPIROCIN 1 G: 20 OINTMENT TOPICAL at 09:05

## 2024-05-12 RX ADMIN — LEVETIRACETAM 500 MG: 500 TABLET, FILM COATED ORAL at 09:05

## 2024-05-12 RX ADMIN — LEVETIRACETAM 500 MG: 500 TABLET, FILM COATED ORAL at 08:05

## 2024-05-12 RX ADMIN — INSULIN ASPART 2 UNITS: 100 INJECTION, SOLUTION INTRAVENOUS; SUBCUTANEOUS at 11:05

## 2024-05-12 RX ADMIN — HYDRALAZINE HYDROCHLORIDE 100 MG: 25 TABLET, FILM COATED ORAL at 09:05

## 2024-05-12 RX ADMIN — SEVELAMER CARBONATE 800 MG: 800 TABLET, FILM COATED ORAL at 11:05

## 2024-05-12 RX ADMIN — INSULIN ASPART 10 UNITS: 100 INJECTION, SOLUTION INTRAVENOUS; SUBCUTANEOUS at 08:05

## 2024-05-12 RX ADMIN — SEVELAMER CARBONATE 800 MG: 800 TABLET, FILM COATED ORAL at 08:05

## 2024-05-12 RX ADMIN — CARVEDILOL 25 MG: 25 TABLET, FILM COATED ORAL at 09:05

## 2024-05-12 RX ADMIN — INSULIN GLARGINE 10 UNITS: 100 INJECTION, SOLUTION SUBCUTANEOUS at 01:05

## 2024-05-12 RX ADMIN — SEVELAMER CARBONATE 800 MG: 800 TABLET, FILM COATED ORAL at 04:05

## 2024-05-12 RX ADMIN — INSULIN GLARGINE 10 UNITS: 100 INJECTION, SOLUTION SUBCUTANEOUS at 09:05

## 2024-05-12 RX ADMIN — HYDRALAZINE HYDROCHLORIDE 100 MG: 25 TABLET, FILM COATED ORAL at 08:05

## 2024-05-12 RX ADMIN — AMLODIPINE BESYLATE 5 MG: 5 TABLET ORAL at 08:05

## 2024-05-12 NOTE — NURSING
Nurses Note -- 4 Eyes      5/12/2024   6:11 AM      Skin assessed during: Transfer      [] No Altered Skin Integrity Present    []Prevention Measures Documented      [x] Yes- Altered Skin Integrity Present or Discovered   [x] LDA Added if Not in Epic (Describe Wound)   [x] New Altered Skin Integrity was Present on Admit and Documented in LDA   [x] Wound Image Taken                Wound Care Consulted? No    Attending Nurse:  Franny Woo RN/Staff Member:   Ilana

## 2024-05-12 NOTE — SUBJECTIVE & OBJECTIVE
Interval History:  Patient seen and examined.  No acute complaints.    Review of Systems   Constitutional:  Negative for chills and fever.   Respiratory:  Negative for chest tightness, shortness of breath and wheezing.    Cardiovascular:  Negative for chest pain and palpitations.   Gastrointestinal:  Negative for constipation, diarrhea, nausea and vomiting.   Genitourinary:  Negative for dysuria and hematuria.   Musculoskeletal:  Negative for gait problem.   Neurological:  Negative for dizziness, speech difficulty and numbness.     Objective:     Vital Signs (Most Recent):  Temp: 98 °F (36.7 °C) (05/12/24 1500)  Pulse: 76 (05/12/24 1500)  Resp: 17 (05/12/24 1500)  BP: 108/65 (05/12/24 1500)  SpO2: (!) 94 % (05/12/24 1500) Vital Signs (24h Range):  Temp:  [96.7 °F (35.9 °C)-98.6 °F (37 °C)] 98 °F (36.7 °C)  Pulse:  [75-90] 76  Resp:  [17-20] 17  SpO2:  [90 %-100 %] 94 %  BP: (108-225)/() 108/65     Weight: 54.3 kg (119 lb 11.4 oz)  Body mass index is 21.9 kg/m².    Intake/Output Summary (Last 24 hours) at 5/12/2024 1732  Last data filed at 5/12/2024 1444  Gross per 24 hour   Intake 1435 ml   Output 4200 ml   Net -2765 ml         Physical Exam  Vitals and nursing note reviewed.   Constitutional:       General: She is not in acute distress.     Appearance: She is well-developed. She is not diaphoretic.   HENT:      Head: Normocephalic.      Mouth/Throat:      Mouth: Mucous membranes are dry.   Eyes:      General: No scleral icterus.     Conjunctiva/sclera: Conjunctivae normal.      Pupils: Pupils are equal, round, and reactive to light.   Neck:      Vascular: No JVD.   Cardiovascular:      Rate and Rhythm: Regular rhythm. Tachycardia present.      Heart sounds: Normal heart sounds. No murmur heard.     No friction rub. No gallop.   Pulmonary:      Effort: Pulmonary effort is normal. No respiratory distress.      Breath sounds: Normal breath sounds. No wheezing or rales.   Abdominal:      General: Bowel sounds are  normal. There is no distension.      Palpations: Abdomen is soft.      Tenderness: There is abdominal tenderness. There is no guarding or rebound.   Musculoskeletal:         General: No tenderness. Normal range of motion.      Cervical back: Normal range of motion and neck supple.   Lymphadenopathy:      Cervical: No cervical adenopathy.   Skin:     General: Skin is warm and dry.      Capillary Refill: Capillary refill takes less than 2 seconds.      Coloration: Skin is not pale.      Findings: No erythema or rash.   Neurological:      Mental Status: She is alert and oriented to person, place, and time.      Cranial Nerves: No cranial nerve deficit.      Sensory: No sensory deficit.      Coordination: Coordination normal.      Deep Tendon Reflexes: Reflexes normal.   Psychiatric:         Behavior: Behavior normal.         Thought Content: Thought content normal.         Judgment: Judgment normal.             Significant Labs: All pertinent labs within the past 24 hours have been reviewed.    Significant Imaging: I have reviewed all pertinent imaging results/findings within the past 24 hours.

## 2024-05-12 NOTE — ASSESSMENT & PLAN NOTE
Creatine stable for now. BMP reviewed- noted Estimated Creatinine Clearance: 10.7 mL/min (A) (based on SCr of 5.8 mg/dL (H)). according to latest data. Based on current GFR, CKD stage is end stage.  Monitor UOP and serial BMP and adjust therapy as needed. Renally dose meds. Avoid nephrotoxic medications and procedures.

## 2024-05-12 NOTE — PROGRESS NOTES
05/11/24 2000   Handoff Report   Received From Stephanie Mena   Given To Florentino   Vital Signs   Temp 98.4 °F (36.9 °C)   Temp Source Oral   Pulse 82   Resp 18   SpO2 100 %   BP (!) 210/100   BP Location Right arm   BP Method Automatic   Patient Position Lying   Assessments (Pre/Post)   Blood Liters Processed (BLP) 50.2   Transport Modality not applicable   Level of Consciousness (AVPU) alert   Dialyzer Clearance mildly streaked        Hemodialysis AV Fistula Left upper arm   No placement date or time found.   Location: Left upper arm   Site Assessment Clean;Dry;Intact   Patency Present;Thrill;Bruit   Status Deaccessed   Flows Good   Dressing Intervention First dressing   Dressing Status Clean;Dry;Intact   Site Condition No complications   Dressing Gauze   Post-Hemodialysis Assessment   Rinseback Volume (mL) 250 mL   Blood Volume Processed (Liters) 50.2 L   Dialyzer Clearance Lightly streaked   Duration of Treatment 180 minutes   Additional Fluid Intake (mL) 500 mL   Total UF (mL) 4200 mL   Net Fluid Removal 3000   Patient Response to Treatment Tolerated well   Post-Treatment Weight   (no scale)   Arterial bleeding stop time (min) 5 min   Venous bleeding stop time (min) 5 min   Post-Hemodialysis Comments Tx complete, pt stable       HD tx tolerated well  Net UF 3 L  2 units PRBC administered  Pt educated on tx adherence

## 2024-05-12 NOTE — PROGRESS NOTES
Nephrology Consult Note        Patient Name: Tabby Howard  MRN: [id number]    Patient Class: IP- Inpatient   Admission Date: 2024  Length of Stay: 1 days  Date of Service: 2024    Attending Physician: Adam Hubbard MD  Primary Care Provider: Melony Kim NP    Reason for Consult: esrd    SUBJECTIVE:     HPI: 34Fwith hx DM, HFpEF, HTN, ESRD on HD, gastroparesis, multiple prior surgeries including cholex,  presenting with multiple days of left upper quadrant abdominal pain. Seen at an outside ED 2 days prior with limited notes available from visit. She did have left upper quadrant pain at that time similar to present. Patient describes constant pain in the left upper quadrant accompanied by nonbilious, nonbloody vomiting. She has extensive GI history including gastroparesis with similar presentations for same. She denies change in bowel movements, blood in the stools, dark stools. No recent fever. No hematemesis. No associated chest pain or dyspnea. She is anuric and does not make urine. She last attended dialysis Tuesday and has missed the last 2 sessions. Her K is 6.6 and Hgb 5. She will need emergency dialysis and transfusion of 2 PRBC.    Seen on HD, tolerating well.     VSS, no new complains. HD tomorrow.    Past Medical History:   Diagnosis Date    ESRD on hemodialysis 2022    Gastritis 2022    EGD was 22    Gastroparesis 2022    has not had Emptying study    Heart failure with preserved ejection fraction 2022    EF 55% on 3/22    History of supraventricular tachycardia     Hyperkalemia 2022    Hypertensive emergency 2022    Sickle cell trait 2022    Type 2 diabetes mellitus      Past Surgical History:   Procedure Laterality Date     SECTION      x 3    COLONOSCOPY      COLONOSCOPY N/A 2022    Procedure: COLONOSCOPY;  Surgeon: Jagdeep Cedeno MD;  Location: [facility];  Service: Endoscopy;  Laterality: N/A;    DESTRUCTION,  CILIARY BODY, USING LASER Left 3/8/2024    Procedure: Cyclophotocoagulation G-probe, retrobulbar chlorpromazine left eye;  Surgeon: Fidel Wise MD;  Location: [address];  Service: Ophthalmology;  Laterality: Left;    ENDOSCOPIC ULTRASOUND OF UPPER GASTROINTESTINAL TRACT N/A 12/16/2023    Procedure: ULTRASOUND, UPPER GI TRACT, ENDOSCOPIC;  Surgeon: Micky Paredes III, MD;  Location: [facility];  Service: Endoscopy;  Laterality: N/A;    ESOPHAGOGASTRODUODENOSCOPY N/A 10/18/2019    Procedure: ESOPHAGOGASTRODUODENOSCOPY (EGD);  Surgeon: Gianluca Mendez MD;  Location: [facility];  Service: Endoscopy;  Laterality: N/A;    ESOPHAGOGASTRODUODENOSCOPY N/A 08/24/2022    Procedure: EGD (ESOPHAGOGASTRODUODENOSCOPY);  Surgeon: Micky Paredes III, MD;  Location: [facility];  Service: Endoscopy;  Laterality: N/A;    ESOPHAGOGASTRODUODENOSCOPY N/A 12/5/2022    Procedure: EGD (ESOPHAGOGASTRODUODENOSCOPY);  Surgeon: Marcelo Zhong MD;  Location: [facility] ENDO;  Service: Endoscopy;  Laterality: N/A;    INSERTION, CATHETER, TUNNELED N/A 6/17/2023    Procedure: Insertion,catheter,tunneled;  Surgeon: Carlos Thurman Jr., MD;  [facility];  Service: General;  Laterality: N/A;    LAPAROSCOPIC CHOLECYSTECTOMY N/A 07/30/2022    Procedure: CHOLECYSTECTOMY, LAPAROSCOPIC;  Surgeon: Grey Perez MD;  Location: [facility] OR;  Service: General;  Laterality: N/A;    PLACEMENT OF DUAL-LUMEN VASCULAR CATHETER Left 07/12/2022    Procedure: INSERTION-CATHETER-JOSEPH;  Surgeon: Dionte Gan MD;  Location: [facility] OR;  Service: General;  Laterality: Left;    PLACEMENT OF DUAL-LUMEN VASCULAR CATHETER Right 07/26/2022    Procedure: INSERTION-CATHETER-Hemosplit;  Surgeon: Dionte Gan MD;  Location: [facility] OR;  Service: General;  Laterality: Right;     Family History   Problem Relation Name Age of Onset    Diabetes Mother      Diabetes Father       Social History     Tobacco Use    Smoking status: Never    Smokeless tobacco:  Never   Substance Use Topics    Alcohol use: Not Currently    Drug use: No       Review of patient's allergies indicates:   Allergen Reactions    Penicillins Hives       Outpatient meds:  No current facility-administered medications on file prior to encounter.     Current Outpatient Medications on File Prior to Encounter   Medication Sig Dispense Refill    amLODIPine (NORVASC) 5 MG tablet Take 1 tablet (5 mg total) by mouth once daily. 30 tablet 11    amLODIPine (NORVASC) 5 MG tablet take 2 tablets Oral Daily 60 tablet 0    apixaban (ELIQUIS) 5 mg Tab Take 1 tablet (5 mg total) by mouth 2 (two) times daily. Patient w/ thrombocytopenia <50, so held during admission, follow-up with hematologist about restarting 180 tablet 1    atropine 1% (ISOPTO ATROPINE) 1 % Drop Place 1 drop into the left eye 2 (two) times a day. 15 mL 3    blood sugar diagnostic (TRUE METRIX GLUCOSE TEST STRIP) Strp Use 1 strip to check blood glucose three times daily 100 each 11    blood-glucose meter (TRUE METRIX GLUCOSE METER) Misc Use to check blood glucose 1 each 0    blood-glucose meter,continuous (DEXCOM ) Misc USE WITH SENSORS 1 each 0    blood-glucose sensor Heather CHANGE EVERY 10 DAYS 3 each 0    brimonidine 0.15 % OPTH DROP (ALPHAGAN) 0.15 % ophthalmic solution Place 1 drop into both eyes 3 (three) times daily. 15 mL 3    busPIRone (BUSPAR) 10 MG tablet Take 1 tablet (10 mg total) by mouth 3 (three) times daily as needed (anxiety). 60 tablet 5    carvediloL (COREG) 25 MG tablet take 1 tablet Oral 2 times daily 60 tablet 0    cetirizine (ZYRTEC) 10 MG tablet Take 1 tablet every day by oral route. 30 tablet 0    dorzolamide-timolol 2-0.5% (COSOPT) 22.3-6.8 mg/mL ophthalmic solution Place 1 drop into the left eye every 12 (twelve) hours. 10 mL 6    dorzolamide-timolol 2-0.5% (COSOPT) 22.3-6.8 mg/mL ophthalmic solution place 1 drop in left eye twice a day  for 30 days 10 mL 0    FLUoxetine 20 MG capsule Take 1 capsule every day by  "oral route for 90 days. 90 capsule 1    FLUoxetine 20 MG capsule take 2 capsules Oral Daily 60 capsule 0    FLUoxetine 20 MG capsule Take 1 capsule (20 mg total) by mouth once daily, for Mood.. 90 capsule 2    fluticasone propionate (FLONASE ALLERGY RELIEF) 50 mcg/actuation nasal spray [place] 1 spray every day by intranasal route. 16 g 2    gabapentin (NEURONTIN) 300 MG capsule Take 2 capsules twice a day by oral route for 90 days. 360 capsule 1    hydrALAZINE (APRESOLINE) 100 MG tablet Take 1 tablet (100 mg total) by mouth 2 (two) times daily. 180 tablet 1    hydrALAZINE (APRESOLINE) 25 MG tablet take 1 tablet by mouth every 8 hours 90 tablet 0    insulin detemir U-100, Levemir, (LEVEMIR FLEXTOUCH U100 INSULIN) 100 unit/mL (3 mL) InPn pen Inject 22 Units into the skin every evening. Patient not needing insulin in-patient. Follow-up with PCP for hospital follow-up and restarting the medication. Or restart medication if glucose >200 consistently resume home insulin regimen. Or if glucose is persistently less than 100, decrease by 5 units until following up with PCP 15 mL 1    insulin glargine U-100, Lantus, (LANTUS) 100 unit/mL injection inject 13 unit subcutaneously 2 times daily 10 mL 0    isosorbide mononitrate (IMDUR) 30 MG 24 hr tablet Take 1 tablet (30 mg total) by mouth once daily. 90 tablet 1    lancets (TRUEPLUS LANCETS) 33 gauge Misc Use 1 to check blood glucose three times daily 100 each 11    lancets 33 gauge Misc Use to test twice daily 100 each 5    levETIRAcetam (KEPPRA) 500 MG Tab Take 1 tablet (500 mg total) by mouth 2 (two) times daily. 60 tablet 11    ondansetron (ZOFRAN-ODT) 4 MG TbDL Place 1 tablet (4 mg) on or under the tongue every 8 hours for 10 days. 24 tablet 2    oxyCODONE-acetaminophen (PERCOCET)  mg per tablet Take 1 tablet by mouth every 4 (four) hours as needed for Pain. 42 tablet 0    pen needle, diabetic 31 gauge x 3/16" Ndle Use as directed to inject insulin 5 times daily 100 " "each 11    pen needle, diabetic 31 gauge x 3/16" Ndle Use as directed with insulin once daily 100 each 0    prednisoLONE acetate (PRED FORTE) 1 % DrpS Place 1 drop into the left eye 2 (two) times daily. 5 mL 3    prednisoLONE acetate (PRED FORTE) 1 % DrpS Place 1 drop in left eye 4 times daily for 5 days 5 mL 0    predniSONE (DELTASONE) 20 MG tablet take 3 tablets Oral Daily,x30 day(s) 90 tablet 0    sevelamer carbonate (RENVELA) 800 mg Tab Take 1 tablet (800 mg total) by mouth 3 (three) times daily with meals. 180 tablet 11    sucralfate (CARAFATE) 100 mg/mL suspension Take 10 mL 4 times a day by oral route before meals for 30 days. 1200 mL 1    tobramycin-dexAMETHasone 0.3-0.1% (TOBRADEX) 0.3-0.1 % DrpS 1 drop Eye-Left every 6 hours for 5 day(s) 5 mL 0    [DISCONTINUED] atenoloL (TENORMIN) 50 MG tablet Take 1 tablet (50 mg total) by mouth every other day. 45 tablet 3    [DISCONTINUED] omeprazole (PRILOSEC) 20 MG capsule Take 2 capsules (40 mg total) by mouth once daily. for 10 days 20 capsule 0       Scheduled meds:   amLODIPine  5 mg Oral Daily    carvediloL  25 mg Oral BID    hydrALAZINE  100 mg Oral BID    insulin glargine U-100  10 Units Subcutaneous BID    levETIRAcetam  500 mg Oral BID    mupirocin   Nasal BID    sevelamer carbonate  800 mg Oral TID WM       Infusions:      PRN meds:    Current Facility-Administered Medications:     0.9%  NaCl infusion (for blood administration), , Intravenous, Q24H PRN    acetaminophen, 650 mg, Oral, Q4H PRN    albuterol-ipratropium, 3 mL, Nebulization, Q4H PRN    aluminum-magnesium hydroxide-simethicone, 30 mL, Oral, QID PRN    dextrose 10%, 12.5 g, Intravenous, PRN    dextrose 10%, 25 g, Intravenous, PRN    glucagon (human recombinant), 1 mg, Intramuscular, PRN    glucose, 16 g, Oral, PRN    glucose, 24 g, Oral, PRN    insulin aspart U-100, 1-10 Units, Subcutaneous, QID (AC + HS) PRN    melatonin, 9 mg, Oral, Nightly PRN    naloxone, 0.02 mg, Intravenous, PRN    " ondansetron, 4 mg, Intravenous, Q8H PRN    oxyCODONE, 5 mg, Oral, Q6H PRN    simethicone, 1 tablet, Oral, QID PRN    sodium chloride 0.9%, 10 mL, Intravenous, Q8H PRN    Review of Systems:    OBJECTIVE:     Vital Signs and IO:  Temp:  [96.6 °F (35.9 °C)-98.6 °F (37 °C)]   Pulse:  [70-90]   Resp:  [18-20]   BP: (112-225)/()   SpO2:  [90 %-100 %]   I/O last 3 completed shifts:  In: 1195 [P.O.:60; Blood:635; Other:500]  Out: 4200 [Other:4200]  Wt Readings from Last 5 Encounters:   05/12/24 54.3 kg (119 lb 11.4 oz)   03/30/24 53.1 kg (117 lb)   03/08/24 53.1 kg (117 lb)   03/06/24 52.6 kg (116 lb)   02/28/24 52.7 kg (116 lb 2.9 oz)     Body mass index is 21.9 kg/m².    Physical Exam  Constitutional:       General: Patient is in some acute distress.     Appearance: Patient is well-developed. She is not diaphoretic.   HENT:      Head: Normocephalic and atraumatic.      Mouth/Throat: Mucous membranes are moist.   Eyes:      General: No scleral icterus.     Pupils: Pupils are equal, round, and reactive to light.   Cardiovascular:      Rate and Rhythm: Normal rate and regular rhythm.   Pulmonary:      Effort: Pulmonary effort is normal. No respiratory distress.      Breath sounds: No stridor.   Abdominal:      General: There is no distension.      Palpations: Abdomen is soft.   Musculoskeletal:         General: No deformity. Normal range of motion.      Cervical back: Neck supple.   Skin:     General: Skin is warm and dry.      Findings: No rash present. No erythema.   Neurological:      Mental Status: Patient is alert and oriented to person, place, and time.      Cranial Nerves: No cranial nerve deficit.   Psychiatric:         Behavior: Behavior normal.     Laboratory:  Recent Labs   Lab 05/11/24  1341 05/12/24  0026 05/12/24  0425     --  139   K 6.6* 4.3 4.5     --  98   CO2 17*  --  25   BUN 94*  --  41*   CREATININE 9.5*  --  5.8*   *  --  265*       Recent Labs   Lab 05/11/24  1873  05/12/24  0425   CALCIUM 7.4* 9.2   ALBUMIN 3.7 3.4*   PHOS  --  5.8*   MG  --  2.2       Recent Labs   Lab 07/08/22  0516   PTH, Intact 289.8 H       Recent Labs   Lab 05/11/24  2159   POCTGLUCOSE 120*       Recent Labs   Lab 12/23/22  1413 03/03/23  0547 08/01/23  0813   Hemoglobin A1C 7.5 H 5.8 5.8       Recent Labs   Lab 05/11/24  1341 05/11/24  2259 05/12/24  0425   WBC 11.11 8.76 8.58   HGB 5.1* 9.4* 9.2*   HCT 15.8* 27.0* 26.5*    103* 104*   MCV 95 88 87   MCHC 32.3 34.8 34.7   MONO 9.6  1.1* 10.2  0.9 10.5  0.9   EOSINOPHIL 0.1 1.0 1.7       Recent Labs   Lab 05/11/24  1341 05/12/24  0425   BILITOT 2.6* 2.2*   PROT 6.6 6.6   ALBUMIN 3.7 3.4*   ALKPHOS 227* 227*   ALT 69* 56*   AST 56* 35       Recent Labs   Lab 03/16/22  1848 05/15/22  2022 02/05/23  0156 09/19/23  1422 10/13/23  1001   Color, UA Pale Yellow   < > Yellow Yellow Yellow   Appearance, UA  --    < > Clear Clear Clear   pH, UA  --    < > >8.0 A >8.0 A 8.0   Specific Gravity, UA  --    < > 1.020 1.020 1.015   Protein, UA  --    < > 3+ A 4+ 4+   Glucose, UA 50 mg/dL A   < > 2+ A 4+ A 3+ A   Ketones, UA  --    < > 1+ A Trace A Trace A   Urobilinogen, UA Normal   < > Negative Negative Negative   Bilirubin (UA) Negative   < > 2+ A Negative 1+ A   Occult Blood UA  --    < > 3+ A 2+ A Negative   Blood, UA 0.03 mg/dL A   < >  --   --   --    Nitrite, UA  --    < > Negative Negative Negative   RBC, UA  --    < > 15 H 17 H 8 H   WBC, UA  --    < > 1 2 5   Bacteria  --    < > Rare Negative Negative   Mucus, UA Rare A  --   --   --   --    Hyaline Casts, UA  --    < > 2 A 1 2 A    < > = values in this interval not displayed.       Recent Labs   Lab 10/03/23  2105 10/04/23  0438 10/15/23  0353 02/24/24  1855   POC PH 7.290 LL 7.305 L 7.239 LL  --    POC PCO2 45.2 H 43.6 37.8  --    POC HCO3 21.7 L 21.7 L 16.1 L  --    POC PO2 110 H 92 37 LL  --    POC SATURATED O2 98 96 60  --    POC BE -5 -5 -11  --    Sample ARTERIAL ARTERIAL VENOUS PIETER        Microbiology Results (last 7 days)       ** No results found for the last 168 hours. **            ASSESSMENT/PLAN:     ESRD on HD TTS via AVF  Next HD per schedule.  Continue current dialysis prescription.  Renal diet - low K, low phos.  No IVs or BP checks on access and/or non-dominant arm.    Anemia of CKD + acute anemia  Transfuse 2 PRBC with HD on 5/11  Will provide SHELLEY and/or IV iron PRN.    MBD / Secondary HPT  Ca, Phos, PTH and vitamin D levels are acceptable.   Phos binders, vitamin D and analogues, calcimimetics will be given as needed.    HTN  BP seem controlled.   Tolerate asymptomatic HTN up to -160.  Continue home meds.  Low sodium diet.    Thank you for allowing us to participate in the care of your patient!   We will follow the patient and provide recommendations as needed.    Patient care time was spent personally by me on the following activities:     Obtaining a history.  Examination of patient.  Providing medical care at the patients bedside.  Developing a treatment plan with patient or surrogate and bedside caregivers.  Ordering and reviewing laboratory studies, radiographic studies, pulse oximetry.  Ordering and performing treatments and interventions.  Evaluation of patient's response to treatment.  Discussions with consultants while on the unit and immediately available to the patient.  Re-evaluation of the patient's condition.  Documentation in the medical record.     Mando Benitez MD    [facility]  [address] [place] [address]  [place] [address]    [phone] - tel  [phone] - fax    5/12/2024

## 2024-05-12 NOTE — ASSESSMENT & PLAN NOTE
This patient has hyperkalemia which is resolved. We will monitor for arrhythmias with EKG or continuous telemetry. treated with Potassium Binders, Calcium gluconate, and hemodialysis . The likely etiology of the hyperkalemia is ESRD.  The patients latest potassium has been reviewed and the results are listed below  Recent Labs   Lab 05/12/24  0425   K 4.5

## 2024-05-12 NOTE — ASSESSMENT & PLAN NOTE
Patient's FSGs are uncontrolled due to hyperglycemia on current medication regimen.  Last A1c reviewed-   Lab Results   Component Value Date    HGBA1C 5.8 08/01/2023     Most recent fingerstick glucose reviewed-   Recent Labs   Lab 05/11/24  2159   POCTGLUCOSE 120*     Current correctional scale  Medium  Maintain anti-hyperglycemic dose as follows-   Antihyperglycemics (From admission, onward)      Start     Stop Route Frequency Ordered    05/12/24 1145  insulin glargine U-100 (Lantus) pen 10 Units         -- SubQ 2 times daily 05/12/24 1142    05/11/24 1918  insulin aspart U-100 pen 1-10 Units         -- SubQ Before meals & nightly PRN 05/11/24 1820          Hold Oral hypoglycemics while patient is in the hospital.  Patient takes 22 units of Lantus at home b.i.d., we will start 10 units b.i.d. here as well as sliding scale.

## 2024-05-12 NOTE — CARE UPDATE
05/11/24 1932   Patient Assessment/Suction   Level of Consciousness (AVPU) alert   Respiratory Effort Normal;Unlabored   Expansion/Accessory Muscles/Retractions expansion symmetric;no retractions;no use of accessory muscles   PRE-TX-O2   Device (Oxygen Therapy) nasal cannula   $ Is the patient on Low Flow Oxygen? Yes   Flow (L/min) (Oxygen Therapy) 3   Oxygen Concentration (%) 32   SpO2 99 %   Pulse Oximetry Type Continuous   $ Pulse Oximetry - Single Charge Pulse Oximetry - Single   Pulse 84   Resp 18   Aerosol Therapy   $ Aerosol Therapy Charges PRN treatment not required   Respiratory Treatment Status (SVN) PRN treatment not required   Ready to Wean/Extubation Screen   FIO2<=50 (chart decimal) 0.32

## 2024-05-12 NOTE — CARE UPDATE
Report called to Dr. Hubbard. Updated on patient's status. Repeat H/H 9.4/27.0 and Potassium 4.3. VSS.

## 2024-05-12 NOTE — NURSING
Patient arrived to unit via EMS from [facility]. Patient ambulated over to bed with assistance without difficulty. Patient oriented to room, bed in lowest position, wheels locked, bed rails up x2, bed alarm initiated, call light and bedside table within reach. Safety measures addressed.

## 2024-05-12 NOTE — HOSPITAL COURSE
Of note she missed her 5/9 dialysis. Patient received 2 PRBC on 05/11 and her hemoglobin seems stable 2 9 the next day.  BUN and creatinine also improved and are potassium stabilized.  Nephrology on board and if patient is stabilized can resume TTa dialysis schedule and will start IV iron.  If 5/13 labs remain stable can likely DC.  Nephrology re-evaluated patient and recommended initiate her on Lokelma.  Patient is cleared for discharge home after completing hemodialysis treatment.  New prescription sent to pharmacy.  Patient will resume her normal schedule for outpatient dialysis tomorrow.

## 2024-05-12 NOTE — NURSING
Report called to Franny at [facility]. Transportation called to transfer patient to room 2514.

## 2024-05-12 NOTE — PROGRESS NOTES
[facility] Medicine  Progress Note    Patient Name: Tabby Howard  MRN: [id number]  Patient Class: IP- Inpatient   Admission Date: 5/11/2024  Length of Stay: 1 days  Attending Physician: Adam Hubbard MD  Primary Care Provider: Melony Kim NP        Subjective:     Principal Problem:ESRD (end stage renal disease)        HPI:  Tabby Howard is a 34-year-old female who presented to the emergency room for evaluation of left upper quadrant pain for several days.  She reports left upper quadrant pain onset approximately 3 days ago.  She was seen at outside emergency room 2 days ago ([facility] [place]). Limited access to outside facilities medical records. I did see where her potassium at that visit was 5.7. She describes the pain as an intense sensation.  Melena, hematemesis, hematochezia.  No aggravating alleviating factors.  She denies any fever or chills.  No known sick contacts or travel.  She has a complex medical history including diabetes, gastroparesis, end-stage renal disease, sickle cell, hypertension, deep vein thrombosis, anemia, GERD, CHF, and seizures.  She missed her last 2 scheduled dialysis sessions.  ER workup today:  CBC with H&H of 5/15.  CMP with potassium of 6.6, glucose 230, BUN 94, creatinine 9.5, bilirubin 2.6.  CT the abdomen and pelvis was negative for any acute findings.  ER physician spoke with Nephrology who recommended dialysis now and will transfusing 2 units of packed red blood cells.  Patient be admitted to Hospital Medicine for treatment and management.  Patient was receiving dialysis and blood transfusions at the time my exam.    Overview/Hospital Course:  Of note she missed her 5/9 dialysis. Patient received 2 PRBC on 05/11 and her hemoglobin seems stable 2 9 the next day.  BUN and creatinine also improved and are potassium stabilized.  Nephrology on board and if patient is stabilized can resume TTa dialysis schedule and will start IV iron.  If 5/13  labs remain stable can likely DC.    Interval History:  Patient seen and examined.  No acute complaints.    Review of Systems   Constitutional:  Negative for chills and fever.   Respiratory:  Negative for chest tightness, shortness of breath and wheezing.    Cardiovascular:  Negative for chest pain and palpitations.   Gastrointestinal:  Negative for constipation, diarrhea, nausea and vomiting.   Genitourinary:  Negative for dysuria and hematuria.   Musculoskeletal:  Negative for gait problem.   Neurological:  Negative for dizziness, speech difficulty and numbness.     Objective:     Vital Signs (Most Recent):  Temp: 98 °F (36.7 °C) (05/12/24 1500)  Pulse: 76 (05/12/24 1500)  Resp: 17 (05/12/24 1500)  BP: 108/65 (05/12/24 1500)  SpO2: (!) 94 % (05/12/24 1500) Vital Signs (24h Range):  Temp:  [96.7 °F (35.9 °C)-98.6 °F (37 °C)] 98 °F (36.7 °C)  Pulse:  [75-90] 76  Resp:  [17-20] 17  SpO2:  [90 %-100 %] 94 %  BP: (108-225)/() 108/65     Weight: 54.3 kg (119 lb 11.4 oz)  Body mass index is 21.9 kg/m².    Intake/Output Summary (Last 24 hours) at 5/12/2024 1732  Last data filed at 5/12/2024 1444  Gross per 24 hour   Intake 1435 ml   Output 4200 ml   Net -2765 ml         Physical Exam  Vitals and nursing note reviewed.   Constitutional:       General: She is not in acute distress.     Appearance: She is well-developed. She is not diaphoretic.   HENT:      Head: Normocephalic.      Mouth/Throat:      Mouth: Mucous membranes are dry.   Eyes:      General: No scleral icterus.     Conjunctiva/sclera: Conjunctivae normal.      Pupils: Pupils are equal, round, and reactive to light.   Neck:      Vascular: No JVD.   Cardiovascular:      Rate and Rhythm: Regular rhythm. Tachycardia present.      Heart sounds: Normal heart sounds. No murmur heard.     No friction rub. No gallop.   Pulmonary:      Effort: Pulmonary effort is normal. No respiratory distress.      Breath sounds: Normal breath sounds. No wheezing or rales.    Abdominal:      General: Bowel sounds are normal. There is no distension.      Palpations: Abdomen is soft.      Tenderness: There is abdominal tenderness. There is no guarding or rebound.   Musculoskeletal:         General: No tenderness. Normal range of motion.      Cervical back: Normal range of motion and neck supple.   Lymphadenopathy:      Cervical: No cervical adenopathy.   Skin:     General: Skin is warm and dry.      Capillary Refill: Capillary refill takes less than 2 seconds.      Coloration: Skin is not pale.      Findings: No erythema or rash.   Neurological:      Mental Status: She is alert and oriented to person, place, and time.      Cranial Nerves: No cranial nerve deficit.      Sensory: No sensory deficit.      Coordination: Coordination normal.      Deep Tendon Reflexes: Reflexes normal.   Psychiatric:         Behavior: Behavior normal.         Thought Content: Thought content normal.         Judgment: Judgment normal.             Significant Labs: All pertinent labs within the past 24 hours have been reviewed.    Significant Imaging: I have reviewed all pertinent imaging results/findings within the past 24 hours.    Assessment/Plan:      * ESRD (end stage renal disease)  Creatine stable for now. BMP reviewed- noted Estimated Creatinine Clearance: 10.7 mL/min (A) (based on SCr of 5.8 mg/dL (H)). according to latest data. Based on current GFR, CKD stage is end stage.  Monitor UOP and serial BMP and adjust therapy as needed. Renally dose meds. Avoid nephrotoxic medications and procedures.    Hyperkalemia  This patient has hyperkalemia which is resolved. We will monitor for arrhythmias with EKG or continuous telemetry. treated with Potassium Binders, Calcium gluconate, and hemodialysis . The likely etiology of the hyperkalemia is ESRD.  The patients latest potassium has been reviewed and the results are listed below  Recent Labs   Lab 05/12/24  0425   K 4.5               Symptomatic  anemia  Patient's anemia is currently uncontrolled. Has received 2 units of PRBCs on today during dialysis treatment . Etiology likely d/t  unknown.  Current CBC reviewed-   Lab Results   Component Value Date    HGB 9.2 (L) 05/12/2024    HCT 26.5 (L) 05/12/2024     Monitor serial CBC and transfuse if patient becomes hemodynamically unstable, symptomatic or H/H drops below 7.    Seizures  Chronic problem  Seizure precautions  Continue Keppra 500 mg p.o. b.i.d.         Diabetes  Patient's FSGs are uncontrolled due to hyperglycemia on current medication regimen.  Last A1c reviewed-   Lab Results   Component Value Date    HGBA1C 5.8 08/01/2023     Most recent fingerstick glucose reviewed-   Recent Labs   Lab 05/11/24  2159   POCTGLUCOSE 120*     Current correctional scale  Medium  Maintain anti-hyperglycemic dose as follows-   Antihyperglycemics (From admission, onward)      Start     Stop Route Frequency Ordered    05/12/24 1145  insulin glargine U-100 (Lantus) pen 10 Units         -- SubQ 2 times daily 05/12/24 1142    05/11/24 1918  insulin aspart U-100 pen 1-10 Units         -- SubQ Before meals & nightly PRN 05/11/24 1820          Hold Oral hypoglycemics while patient is in the hospital.  Patient takes 22 units of Lantus at home b.i.d., we will start 10 units b.i.d. here as well as sliding scale.      VTE Risk Mitigation (From admission, onward)           Ordered     Place JOCELYNE hose  Until discontinued         05/11/24 1820     IP VTE HIGH RISK PATIENT  Once         05/11/24 1820     Place sequential compression device  Until discontinued         05/11/24 1820                    Discharge Planning   TATIANA:      Code Status: Full Code   Is the patient medically ready for discharge?:     Reason for patient still in hospital (select all that apply): Treatment  Discharge Plan A: Home with family                  Madai Butler DO  Department of Hospital Medicine   [facility]

## 2024-05-12 NOTE — PLAN OF CARE
[facility]  Initial Discharge Assessment       Primary Care Provider: Melony Kim NP    Admission Diagnosis: LUQ abdominal pain [R10.12]    Admission Date: 5/11/2024  Expected Discharge Date:     Pt is a 34-year-old female who arrived from home with ESRD problem. Information verified as correct on facesheet. The assessment was completed at the patient's bedside.  Pt lives with children. Pt does not have a Living Will or Advance Directive. The Pt is oriented to person, places, and times. PCP last seen 2 mos ago.  Pt denies Coumadin, dialysis, DME, HH, and has not been readmitted into the hospital in the last thirty days.  Pt is capable of performing ADLs without assistance. Pt Step Mom drives her to and from medical appointments. Pt reports she is not taking any meds right now, but when she get out the hospital she will take what they prescribe for her to take; pharmacy she use is [facility].  Pt verbalized plan to discharge home via family transport. Pt has no other needs to be addressed at this time. CM reviewed the chart and will continue to monitor.           Payor: MEDICAID / Plan: [place] ([employer] [place] / Product Type: Managed Medicaid /     Extended Emergency Contact Information  Primary Emergency Contact: Tanya Howard  Address: [address] [place] [address]  Home Phone: [phone]  Mobile Phone: [phone]  Relation: Step parent  Preferred language: English   needed? No  Secondary Emergency Contact: Garcia Howard  Address: [address] [place] [id number]  Mobile Phone: [phone]  Relation: Father  Preferred language: English   needed? No    Discharge Plan A: Home with family  Discharge Plan B: Home with family      [facility]  [address]  [facility] [address]  Phone: [phone] Fax: [phone]      Initial Assessment (most recent)       Adult Discharge Assessment -  05/12/24 1506          Discharge Assessment    Assessment Type Discharge Planning Assessment     Confirmed/corrected address, phone number and insurance Yes     Confirmed Demographics Correct on Facesheet     Source of Information patient     When was your last doctors appointment? --   Pt said her last visit to her PCP was 2 mos agp.    Does patient/caregiver understand observation status Yes     Reason For Admission ESRD (end stage renal disease)     People in Home child(tammy), dependent     Do you expect to return to your current living situation? Yes     Do you have help at home or someone to help you manage your care at home? No     Walking or Climbing Stairs Difficulty no     Dressing/Bathing Difficulty no     Home Accessibility not wheelchair accessible     Home Layout Able to live on 1st floor     Equipment Currently Used at Home none     Readmission within 30 days? No     Patient currently being followed by outpatient case management? No     Do you currently have service(s) that help you manage your care at home? No     Do you take prescription medications? No     Do you have prescription coverage? Yes     Coverage MEDICAID - [place]UE [place] [place]     Do you have any problems affording any of your prescribed medications? No     Is the patient taking medications as prescribed? no     If no, which medications is patient not taking? --   Pt said she is currently not taking any mediation.    Who is going to help you get home at discharge? Tanya Howard (Step parent)  [phone] (Mobile)     How do you get to doctors appointments? family or friend will provide     Are you on dialysis? No     Do you take coumadin? No     Discharge Plan A Home with family     Discharge Plan B Home with family     DME Needed Upon Discharge  none

## 2024-05-12 NOTE — ASSESSMENT & PLAN NOTE
Patient's anemia is currently uncontrolled. Has received 2 units of PRBCs on today during dialysis treatment . Etiology likely d/t  unknown.  Current CBC reviewed-   Lab Results   Component Value Date    HGB 9.2 (L) 05/12/2024    HCT 26.5 (L) 05/12/2024     Monitor serial CBC and transfuse if patient becomes hemodynamically unstable, symptomatic or H/H drops below 7.

## 2024-05-13 VITALS
HEIGHT: 62 IN | OXYGEN SATURATION: 96 % | WEIGHT: 125.69 LBS | TEMPERATURE: 98 F | BODY MASS INDEX: 23.13 KG/M2 | HEART RATE: 75 BPM | RESPIRATION RATE: 18 BRPM | DIASTOLIC BLOOD PRESSURE: 79 MMHG | SYSTOLIC BLOOD PRESSURE: 149 MMHG

## 2024-05-13 PROBLEM — R56.9 SEIZURES: Status: RESOLVED | Noted: 2022-05-29 | Resolved: 2024-05-13

## 2024-05-13 PROBLEM — D64.9 SYMPTOMATIC ANEMIA: Status: RESOLVED | Noted: 2022-10-06 | Resolved: 2024-05-13

## 2024-05-13 PROBLEM — E87.5 HYPERKALEMIA: Status: RESOLVED | Noted: 2020-12-13 | Resolved: 2024-05-13

## 2024-05-13 LAB
ALBUMIN SERPL BCP-MCNC: 3.8 G/DL (ref 3.5–5.2)
ALP SERPL-CCNC: 188 U/L (ref 55–135)
ALT SERPL W/O P-5'-P-CCNC: 36 U/L (ref 10–44)
ANION GAP SERPL CALC-SCNC: 12 MMOL/L (ref 8–16)
ANISOCYTOSIS BLD QL SMEAR: SLIGHT
AST SERPL-CCNC: 21 U/L (ref 10–40)
BASOPHILS # BLD AUTO: 0.07 K/UL (ref 0–0.2)
BASOPHILS NFR BLD: 0.8 % (ref 0–1.9)
BILIRUB SERPL-MCNC: 1.8 MG/DL (ref 0.1–1)
BUN SERPL-MCNC: 63 MG/DL (ref 6–20)
CALCIUM SERPL-MCNC: 8.8 MG/DL (ref 8.7–10.5)
CHLORIDE SERPL-SCNC: 95 MMOL/L (ref 95–110)
CO2 SERPL-SCNC: 28 MMOL/L (ref 23–29)
CREAT SERPL-MCNC: 7.4 MG/DL (ref 0.5–1.4)
DIFFERENTIAL METHOD BLD: ABNORMAL
EOSINOPHIL # BLD AUTO: 0.2 K/UL (ref 0–0.5)
EOSINOPHIL NFR BLD: 2.8 % (ref 0–8)
ERYTHROCYTE [DISTWIDTH] IN BLOOD BY AUTOMATED COUNT: 19 % (ref 11.5–14.5)
EST. GFR  (NO RACE VARIABLE): 6.8 ML/MIN/1.73 M^2
GLUCOSE SERPL-MCNC: 127 MG/DL (ref 70–110)
GLUCOSE SERPL-MCNC: 194 MG/DL (ref 70–110)
GLUCOSE SERPL-MCNC: 254 MG/DL (ref 70–110)
HCT VFR BLD AUTO: 26.2 % (ref 37–48.5)
HGB BLD-MCNC: 8.6 G/DL (ref 12–16)
HYPOCHROMIA BLD QL SMEAR: ABNORMAL
IMM GRANULOCYTES # BLD AUTO: 0.1 K/UL (ref 0–0.04)
IMM GRANULOCYTES NFR BLD AUTO: 1.2 % (ref 0–0.5)
LYMPHOCYTES # BLD AUTO: 1.7 K/UL (ref 1–4.8)
LYMPHOCYTES NFR BLD: 20.6 % (ref 18–48)
MAGNESIUM SERPL-MCNC: 2.4 MG/DL (ref 1.6–2.6)
MCH RBC QN AUTO: 30.2 PG (ref 27–31)
MCHC RBC AUTO-ENTMCNC: 32.8 G/DL (ref 32–36)
MCV RBC AUTO: 92 FL (ref 82–98)
MONOCYTES # BLD AUTO: 1.3 K/UL (ref 0.3–1)
MONOCYTES NFR BLD: 14.9 % (ref 4–15)
NEUTROPHILS # BLD AUTO: 5 K/UL (ref 1.8–7.7)
NEUTROPHILS NFR BLD: 59.7 % (ref 38–73)
NRBC BLD-RTO: 18 /100 WBC
PHOSPHATE SERPL-MCNC: 6 MG/DL (ref 2.7–4.5)
PLATELET # BLD AUTO: 147 K/UL (ref 150–450)
PLATELET BLD QL SMEAR: ABNORMAL
PMV BLD AUTO: 10.3 FL (ref 9.2–12.9)
POLYCHROMASIA BLD QL SMEAR: ABNORMAL
POTASSIUM SERPL-SCNC: 5.5 MMOL/L (ref 3.5–5.1)
PROT SERPL-MCNC: 6.2 G/DL (ref 6–8.4)
RBC # BLD AUTO: 2.85 M/UL (ref 4–5.4)
SODIUM SERPL-SCNC: 135 MMOL/L (ref 136–145)
WBC # BLD AUTO: 8.45 K/UL (ref 3.9–12.7)

## 2024-05-13 PROCEDURE — 36415 COLL VENOUS BLD VENIPUNCTURE: CPT | Performed by: NURSE PRACTITIONER

## 2024-05-13 PROCEDURE — 27000221 HC OXYGEN, UP TO 24 HOURS

## 2024-05-13 PROCEDURE — 83735 ASSAY OF MAGNESIUM: CPT | Performed by: NURSE PRACTITIONER

## 2024-05-13 PROCEDURE — 99900035 HC TECH TIME PER 15 MIN (STAT)

## 2024-05-13 PROCEDURE — 25000003 PHARM REV CODE 250: Performed by: INTERNAL MEDICINE

## 2024-05-13 PROCEDURE — G0378 HOSPITAL OBSERVATION PER HR: HCPCS

## 2024-05-13 PROCEDURE — 80053 COMPREHEN METABOLIC PANEL: CPT | Performed by: NURSE PRACTITIONER

## 2024-05-13 PROCEDURE — 84100 ASSAY OF PHOSPHORUS: CPT | Performed by: NURSE PRACTITIONER

## 2024-05-13 PROCEDURE — 25000003 PHARM REV CODE 250: Performed by: NURSE PRACTITIONER

## 2024-05-13 PROCEDURE — 85025 COMPLETE CBC W/AUTO DIFF WBC: CPT | Mod: 59 | Performed by: NURSE PRACTITIONER

## 2024-05-13 PROCEDURE — 90935 HEMODIALYSIS ONE EVALUATION: CPT

## 2024-05-13 PROCEDURE — 94761 N-INVAS EAR/PLS OXIMETRY MLT: CPT

## 2024-05-13 PROCEDURE — G0257 UNSCHED DIALYSIS ESRD PT HOS: HCPCS

## 2024-05-13 RX ADMIN — MUPIROCIN 1 G: 20 OINTMENT TOPICAL at 08:05

## 2024-05-13 RX ADMIN — CARVEDILOL 25 MG: 25 TABLET, FILM COATED ORAL at 08:05

## 2024-05-13 RX ADMIN — SEVELAMER CARBONATE 800 MG: 800 TABLET, FILM COATED ORAL at 08:05

## 2024-05-13 RX ADMIN — INSULIN ASPART 6 UNITS: 100 INJECTION, SOLUTION INTRAVENOUS; SUBCUTANEOUS at 08:05

## 2024-05-13 RX ADMIN — INSULIN GLARGINE 10 UNITS: 100 INJECTION, SOLUTION SUBCUTANEOUS at 08:05

## 2024-05-13 RX ADMIN — AMLODIPINE BESYLATE 5 MG: 5 TABLET ORAL at 08:05

## 2024-05-13 RX ADMIN — LEVETIRACETAM 500 MG: 500 TABLET, FILM COATED ORAL at 08:05

## 2024-05-13 RX ADMIN — HYDRALAZINE HYDROCHLORIDE 100 MG: 25 TABLET, FILM COATED ORAL at 08:05

## 2024-05-13 NOTE — PLAN OF CARE
05/12/24 2123   Patient Assessment/Suction   Level of Consciousness (AVPU) alert   Respiratory Effort Normal;Unlabored   Expansion/Accessory Muscles/Retractions no use of accessory muscles;expansion symmetric   All Lung Fields Breath Sounds clear   Rhythm/Pattern, Respiratory no shortness of breath reported;depth regular;pattern regular;unlabored   Cough Frequency no cough   PRE-TX-O2   Device (Oxygen Therapy) room air   SpO2 (!) 92 %   Pulse Oximetry Type Continuous   $ Pulse Oximetry - Multiple Charge Pulse Oximetry - Multiple   Pulse 75   Resp 18   Aerosol Therapy   $ Aerosol Therapy Charges PRN treatment not required   Daily Review of Necessity (SVN) completed   Respiratory Treatment Status (SVN) PRN treatment not required   Education   $ Education Bronchodilator;15 min

## 2024-05-13 NOTE — PLAN OF CARE
Charts and orders reviewed. Pt to discharge home.  called Pt's PCP office to schedule Pt follow-up appointment; PCP office stated they would call pt directly to schedule, and PCP details added to AVS. Pt has no other needs to be addressed by case management. Pt cleared to discharge from case management standpoint.        05/13/24 1229   Final Note   Assessment Type Final Discharge Note   Anticipated Discharge Disposition Home   What phone number can be called within the next 1-3 days to see how you are doing after discharge? [phone]   Hospital Resources/Appts/Education Provided Provided patient/caregiver with written discharge plan information   Post-Acute Status   Discharge Delays None known at this time

## 2024-05-13 NOTE — PROGRESS NOTES
05/13/24 1245   Handoff Report   Received From christopher OLMSTEAD RN   Given To Addy SALES RN   Treatment Type   Treatment Type Maintenance   Vital Signs   Temp 98.3 °F (36.8 °C)   Temp Source Oral   Pulse 75   Heart Rate Source Monitor   Resp 18   SpO2 96 %   Device (Oxygen Therapy) room air   BP (!) 149/79   BP Method Automatic   Patient Position Lying   Assessments (Pre/Post)   Blood Liters Processed (BLP) 49.6   Transport Modality not applicable   Level of Consciousness (AVPU) alert   Dialyzer Clearance moderately streaked   Pain   Pain Rating (0-10): Rest 0        Hemodialysis AV Fistula Left upper arm   No placement date or time found.   Location: Left upper arm   Needle Size 17ga   Site Assessment Clean;Dry;Intact;No redness;No swelling   Patency Present;Thrill;Bruit   Status Deaccessed   Flows Good   Dressing Intervention First dressing   Dressing Status Clean;Dry;Intact   Site Condition No complications   Dressing Gauze   Post-Hemodialysis Assessment   Rinseback Volume (mL) 250 mL   Blood Volume Processed (Liters) 49.6 L   Dialyzer Clearance Moderately streaked   Duration of Treatment 180 minutes   Additional Fluid Intake (mL) 500 mL   Total UF (mL) 3000 mL   Net Fluid Removal 2500   Patient Response to Treatment pt lamar tx well   Post-Treatment Weight 55.6 kg (122 lb 9.2 oz)   Treatment Weight Change -2.5   Arterial bleeding stop time (min) 6 min   Venous bleeding stop time (min) 6 min   Post-Hemodialysis Comments pt stable     Pt lamar tx well, 2500 net uf removed

## 2024-05-13 NOTE — NURSING
Patient discharged home. Discharge instructions given and patient verbalized understanding. IV and Tele box removed. No further needs at this time.

## 2024-05-13 NOTE — DISCHARGE SUMMARY
[facility] Medicine  Discharge Summary      Patient Name: Tabby Howard  MRN: [id number]  [facility]: [id number]  Patient Class: IP- Inpatient  Admission Date: 5/11/2024  Hospital Length of Stay: 2 days  Discharge Date and Time:  05/13/2024 11:50 AM  Attending Physician: Coy Coates MD   Discharging Provider: Coy Coates MD  Primary Care Provider: Melony Kim NP    Primary Care Team: Networked reference to record PCT     HPI:   Tabby Howard is a 34-year-old female who presented to the emergency room for evaluation of left upper quadrant pain for several days.  She reports left upper quadrant pain onset approximately 3 days ago.  She was seen at outside emergency room 2 days ago ([facility]). Limited access to outside facilities medical records. I did see where her potassium at that visit was 5.7. She describes the pain as an intense sensation.  Melena, hematemesis, hematochezia.  No aggravating alleviating factors.  She denies any fever or chills.  No known sick contacts or travel.  She has a complex medical history including diabetes, gastroparesis, end-stage renal disease, sickle cell, hypertension, deep vein thrombosis, anemia, GERD, CHF, and seizures.  She missed her last 2 scheduled dialysis sessions.  ER workup today:  CBC with H&H of 5/15.  CMP with potassium of 6.6, glucose 230, BUN 94, creatinine 9.5, bilirubin 2.6.  CT the abdomen and pelvis was negative for any acute findings.  ER physician spoke with Nephrology who recommended dialysis now and will transfusing 2 units of packed red blood cells.  Patient be admitted to Hospital Medicine for treatment and management.  Patient was receiving dialysis and blood transfusions at the time my exam.    * No surgery found *      Hospital Course:   Of note she missed her 5/9 dialysis. Patient received 2 PRBC on 05/11 and her hemoglobin seems stable 2 9 the next day.  BUN and creatinine also improved and are potassium  stabilized.  Nephrology on board and if patient is stabilized can resume TThSa dialysis schedule and will start IV iron.  If 5/13 labs remain stable can likely DC.  Nephrology re-evaluated patient and recommended initiate her on Lokelma.  Patient is cleared for discharge home after completing hemodialysis treatment.  New prescription sent to pharmacy.  Patient will resume her normal schedule for outpatient dialysis tomorrow.     Goals of Care Treatment Preferences:  Code Status: Full Code    Health care agent: Step-Mother Tanya Howard / Father Garcia Howard  Health care agent number: [phone] / [phone]                   Consults:     No new Assessment & Plan notes have been filed under this hospital service since the last note was generated.  Service: Hospital Medicine    Final Active Diagnoses:    Diagnosis Date Noted POA    PRINCIPAL PROBLEM:  ESRD (end stage renal disease) [N18.6] 07/22/2022 Yes    Diabetes [E11.9] 04/12/2022 Yes      Problems Resolved During this Admission:    Diagnosis Date Noted Date Resolved POA    Symptomatic anemia [D64.9] 10/06/2022 05/13/2024 Yes    Seizures [R56.9] 05/29/2022 05/13/2024 Yes    Hyperkalemia [E87.5] 12/13/2020 05/13/2024 Yes       Discharged Condition: fair    Disposition: Home or Self Care    Follow Up:    Patient Instructions:   No discharge procedures on file.    Significant Diagnostic Studies: Labs: All labs within the past 24 hours have been reviewed    Pending Diagnostic Studies:       None           Medications:  Reconciled Home Medications:      Medication List        START taking these medications      sodium zirconium cyclosilicate 10 gram packet  Commonly known as: Lokelma  Take 1 packet (10 g total) by mouth once daily. Mix entire contents of packet(s) into drinking glass containing 3 tablespoons of water; stir well and drink immediately. Add water and repeat until no powder remains to receive entire dose.  Start taking on: May 14, 2024         "    CONTINUE taking these medications      ALPHAGAN P 0.15 % ophthalmic solution  Generic drug: brimonidine 0.15 % OPTH DROP  Place 1 drop into both eyes 3 (three) times daily.     * amLODIPine 5 MG tablet  Commonly known as: NORVASC  Take 1 tablet (5 mg total) by mouth once daily.     * amLODIPine 5 MG tablet  Commonly known as: NORVASC  take 2 tablets Oral Daily     apixaban 5 mg Tab  Commonly known as: ELIQUIS  Take 1 tablet (5 mg total) by mouth 2 (two) times daily. Patient w/ thrombocytopenia <50, so held during admission, follow-up with hematologist about restarting     atropine 1% 1 % Drop  Commonly known as: ISOPTO ATROPINE  Place 1 drop into the left eye 2 (two) times a day.     * BD ULTRA-FINE MINI PEN NEEDLE 31 gauge x 3/16" Ndle  Generic drug: pen needle, diabetic  Use as directed to inject insulin 5 times daily     * BD ULTRA-FINE MINI PEN NEEDLE 31 gauge x 3/16" Ndle  Generic drug: pen needle, diabetic  Use as directed with insulin once daily     busPIRone 10 MG tablet  Commonly known as: BUSPAR  Take 1 tablet (10 mg total) by mouth 3 (three) times daily as needed (anxiety).     carvediloL 25 MG tablet  Commonly known as: COREG  take 1 tablet Oral 2 times daily     cetirizine 10 MG tablet  Commonly known as: ZYRTEC  Take 1 tablet every day by oral route.     DEXCOM G7  Misc  Generic drug: blood-glucose meter,continuous  USE WITH SENSORS     DEXCOM G7 SENSOR Heather  Generic drug: blood-glucose sensor  CHANGE EVERY 10 DAYS     * dorzolamide-timolol 2-0.5% 22.3-6.8 mg/mL ophthalmic solution  Commonly known as: COSOPT  Place 1 drop into the left eye every 12 (twelve) hours.     * dorzolamide-timolol 2-0.5% 22.3-6.8 mg/mL ophthalmic solution  Commonly known as: COSOPT  place 1 drop in left eye twice a day  for 30 days     * FLUoxetine 20 MG capsule  Take 1 capsule every day by oral route for 90 days.     * FLUoxetine 20 MG capsule  take 2 capsules Oral Daily     * FLUoxetine 20 MG capsule  Take 1 " capsule (20 mg total) by mouth once daily, for Mood..     fluticasone propionate 50 mcg/actuation nasal spray  Commonly known as: FLONASE ALLERGY RELIEF  Spray 1 spray every day by intranasal route.     gabapentin 300 MG capsule  Commonly known as: NEURONTIN  Take 2 capsules twice a day by oral route for 90 days.     * hydrALAZINE 100 MG tablet  Commonly known as: APRESOLINE  Take 1 tablet (100 mg total) by mouth 2 (two) times daily.     * hydrALAZINE 25 MG tablet  Commonly known as: APRESOLINE  take 1 tablet by mouth every 8 hours     insulin glargine U-100 (Lantus) 100 unit/mL injection  inject 13 unit subcutaneously 2 times daily     isosorbide mononitrate 30 MG 24 hr tablet  Commonly known as: IMDUR  Take 1 tablet (30 mg total) by mouth once daily.     * lancets 33 gauge Misc  Use to test twice daily     * TRUEPLUS LANCETS 33 gauge Misc  Generic drug: lancets  Use 1 to check blood glucose three times daily     LEVEMIR FLEXPEN 100 unit/mL (3 mL) Inpn pen  Generic drug: insulin detemir U-100 (Levemir)  Inject 22 Units into the skin every evening. Patient not needing insulin in-patient. Follow-up with PCP for hospital follow-up and restarting the medication. Or restart medication if glucose >200 consistently resume home insulin regimen. Or if glucose is persistently less than 100, decrease by 5 units until following up with PCP     levETIRAcetam 500 MG Tab  Commonly known as: KEPPRA  Take 1 tablet (500 mg total) by mouth 2 (two) times daily.     ondansetron 4 MG Tbdl  Commonly known as: ZOFRAN-ODT  Place 1 tablet (4 mg) on or under the tongue every 8 hours for 10 days.     oxyCODONE-acetaminophen  mg per tablet  Commonly known as: PERCOCET  Take 1 tablet by mouth every 4 (four) hours as needed for Pain.     * prednisoLONE acetate 1 % Drps  Commonly known as: PRED FORTE  Place 1 drop into the left eye 2 (two) times daily.     * prednisoLONE acetate 1 % Drps  Commonly known as: PRED FORTE  Place 1 drop in left  eye 4 times daily for 5 days     predniSONE 20 MG tablet  Commonly known as: DELTASONE  take 3 tablets Oral Daily,x30 day(s)     sevelamer carbonate 800 mg Tab  Commonly known as: RENVELA  Take 1 tablet (800 mg total) by mouth 3 (three) times daily with meals.     sucralfate 100 mg/mL suspension  Commonly known as: CARAFATE  Take 10 mL 4 times a day by oral route before meals for 30 days.     tobramycin-dexAMETHasone 0.3-0.1% 0.3-0.1 % Drps  Commonly known as: Tobradex  1 drop Eye-Left every 6 hours for 5 day(s)     TRUE METRIX GLUCOSE METER Misc  Generic drug: blood-glucose meter  Use to check blood glucose     TRUE METRIX GLUCOSE TEST STRIP Strp  Generic drug: blood sugar diagnostic  Use 1 strip to check blood glucose three times daily           * This list has 15 medication(s) that are the same as other medications prescribed for you. Read the directions carefully, and ask your doctor or other care provider to review them with you.                  Indwelling Lines/Drains at time of discharge:   Lines/Drains/Airways       Central Venous Catheter Line  Duration                  Hemodialysis Catheter 06/17/23 1135 right internal jugular 331 days              Drain  Duration                  Hemodialysis AV Fistula Left upper arm -- days                    Time spent on the discharge of patient: 35 minutes         Coy Coates MD  Department of Hospital Medicine  [facility]

## 2024-05-13 NOTE — PLAN OF CARE
Problem: Hemodialysis  Goal: Safe, Effective Therapy Delivery  Outcome: Met  Goal: Effective Tissue Perfusion  Outcome: Met  Goal: Absence of Infection Signs and Symptoms  Outcome: Met     Problem: Infection  Goal: Absence of Infection Signs and Symptoms  Outcome: Met     Problem: Adult Inpatient Plan of Care  Goal: Plan of Care Review  Outcome: Met  Goal: Patient-Specific Goal (Individualized)  Outcome: Met  Goal: Absence of Hospital-Acquired Illness or Injury  Outcome: Met  Goal: Optimal Comfort and Wellbeing  Outcome: Met  Goal: Readiness for Transition of Care  Outcome: Met     Problem: Diabetes Comorbidity  Goal: Blood Glucose Level Within Targeted Range  Outcome: Met

## 2024-05-13 NOTE — PROGRESS NOTES
Nephrology Consult Note        Patient Name: Tabby Howard  MRN: [id number]    Patient Class: IP- Inpatient   Admission Date: 2024  Length of Stay: 2 days  Date of Service: 2024    Attending Physician: Coy Coates MD  Primary Care Provider: Melony Kim NP    Reason for Consult: esrd    SUBJECTIVE:     HPI: 34Fwith hx DM, HFpEF, HTN, ESRD on HD, gastroparesis, multiple prior surgeries including cholex,  presenting with multiple days of left upper quadrant abdominal pain. Seen at an outside ED 2 days prior with limited notes available from visit. She did have left upper quadrant pain at that time similar to present. Patient describes constant pain in the left upper quadrant accompanied by nonbilious, nonbloody vomiting. She has extensive GI history including gastroparesis with similar presentations for same. She denies change in bowel movements, blood in the stools, dark stools. No recent fever. No hematemesis. No associated chest pain or dyspnea. She is anuric and does not make urine. She last attended dialysis Tuesday and has missed the last 2 sessions. Her K is 6.6 and Hgb 5. She will need emergency dialysis and transfusion of 2 PRBC.    Seen on HD, tolerating well.     VSS, no new complains. HD tomorrow.   VSS. HD today, hyperkalemia noted, will add Lokelma.    Past Medical History:   Diagnosis Date    ESRD on hemodialysis 2022    Gastritis 2022    EGD was 22    Gastroparesis 2022    has not had Emptying study    Heart failure with preserved ejection fraction 2022    EF 55% on 3/22    History of supraventricular tachycardia     Hyperkalemia 2022    Hypertensive emergency 2022    Sickle cell trait 2022    Type 2 diabetes mellitus      Past Surgical History:   Procedure Laterality Date     SECTION      x 3    COLONOSCOPY      COLONOSCOPY N/A 2022    Procedure: COLONOSCOPY;  Surgeon: Jagdeep Cedeno MD;  Location: [facility]  ENDO;  Service: Endoscopy;  Laterality: N/A;    DESTRUCTION, CILIARY BODY, USING LASER Left 3/8/2024    Procedure: Cyclophotocoagulation G-probe, retrobulbar chlorpromazine left eye;  Surgeon: Fidel Wise MD;  Location: [address];  Service: Ophthalmology;  Laterality: Left;    ENDOSCOPIC ULTRASOUND OF UPPER GASTROINTESTINAL TRACT N/A 12/16/2023    Procedure: ULTRASOUND, UPPER GI TRACT, ENDOSCOPIC;  Surgeon: Micky Paredes III, MD;  Location: [facility];  Service: Endoscopy;  Laterality: N/A;    ESOPHAGOGASTRODUODENOSCOPY N/A 10/18/2019    Procedure: ESOPHAGOGASTRODUODENOSCOPY (EGD);  Surgeon: Gianluca Mendez MD;  Location: [facility];  Service: Endoscopy;  Laterality: N/A;    ESOPHAGOGASTRODUODENOSCOPY N/A 08/24/2022    Procedure: EGD (ESOPHAGOGASTRODUODENOSCOPY);  Surgeon: Micky Paredes III, MD;  Location: [facility];  Service: Endoscopy;  Laterality: N/A;    ESOPHAGOGASTRODUODENOSCOPY N/A 12/5/2022    Procedure: EGD (ESOPHAGOGASTRODUODENOSCOPY);  Surgeon: Marcelo Zhong MD;  Location: [facility];  Service: Endoscopy;  Laterality: N/A;    INSERTION, CATHETER, TUNNELED N/A 6/17/2023    Procedure: Insertion,catheter,tunneled;  Surgeon: Carlos Thurman Jr., MD;  Location: [facility];  Service: General;  Laterality: N/A;    LAPAROSCOPIC CHOLECYSTECTOMY N/A 07/30/2022    Procedure: CHOLECYSTECTOMY, LAPAROSCOPIC;  Surgeon: Grey Perez MD;  Location: [facility];  Service: General;  Laterality: N/A;    PLACEMENT OF DUAL-LUMEN VASCULAR CATHETER Left 07/12/2022    Procedure: INSERTION-CATHETER-JOSEPH;  Surgeon: Dionte Gan MD;  Location: [facility] OR;  Service: General;  Laterality: Left;    PLACEMENT OF DUAL-LUMEN VASCULAR CATHETER Right 07/26/2022    Procedure: INSERTION-CATHETER-Hemosplit;  Surgeon: Dionte Gan MD;  Location: [facility];  Service: General;  Laterality: Right;     Family History   Problem Relation Name Age of Onset    Diabetes Mother      Diabetes Father       Social History      Tobacco Use    Smoking status: Never    Smokeless tobacco: Never   Substance Use Topics    Alcohol use: Not Currently    Drug use: No       Review of patient's allergies indicates:   Allergen Reactions    Penicillins Hives       Outpatient meds:  No current facility-administered medications on file prior to encounter.     Current Outpatient Medications on File Prior to Encounter   Medication Sig Dispense Refill    amLODIPine (NORVASC) 5 MG tablet Take 1 tablet (5 mg total) by mouth once daily. 30 tablet 11    amLODIPine (NORVASC) 5 MG tablet take 2 tablets Oral Daily 60 tablet 0    apixaban (ELIQUIS) 5 mg Tab Take 1 tablet (5 mg total) by mouth 2 (two) times daily. Patient w/ thrombocytopenia <50, so held during admission, follow-up with hematologist about restarting 180 tablet 1    atropine 1% (ISOPTO ATROPINE) 1 % Drop Place 1 drop into the left eye 2 (two) times a day. 15 mL 3    blood sugar diagnostic (TRUE METRIX GLUCOSE TEST STRIP) Strp Use 1 strip to check blood glucose three times daily 100 each 11    blood-glucose meter (TRUE METRIX GLUCOSE METER) Misc Use to check blood glucose 1 each 0    blood-glucose meter,continuous (DEXCOM ) Misc USE WITH SENSORS 1 each 0    blood-glucose sensor Heather CHANGE EVERY 10 DAYS 3 each 0    brimonidine 0.15 % OPTH DROP (ALPHAGAN) 0.15 % ophthalmic solution Place 1 drop into both eyes 3 (three) times daily. 15 mL 3    busPIRone (BUSPAR) 10 MG tablet Take 1 tablet (10 mg total) by mouth 3 (three) times daily as needed (anxiety). 60 tablet 5    carvediloL (COREG) 25 MG tablet take 1 tablet Oral 2 times daily 60 tablet 0    cetirizine (ZYRTEC) 10 MG tablet Take 1 tablet every day by oral route. 30 tablet 0    dorzolamide-timolol 2-0.5% (COSOPT) 22.3-6.8 mg/mL ophthalmic solution Place 1 drop into the left eye every 12 (twelve) hours. 10 mL 6    dorzolamide-timolol 2-0.5% (COSOPT) 22.3-6.8 mg/mL ophthalmic solution place 1 drop in left eye twice a day  for 30 days 10  mL 0    FLUoxetine 20 MG capsule Take 1 capsule every day by oral route for 90 days. 90 capsule 1    FLUoxetine 20 MG capsule take 2 capsules Oral Daily 60 capsule 0    FLUoxetine 20 MG capsule Take 1 capsule (20 mg total) by mouth once daily, for Mood.. 90 capsule 2    fluticasone propionate (FLONASE ALLERGY RELIEF) 50 mcg/actuation nasal spray [place] 1 spray every day by intranasal route. 16 g 2    gabapentin (NEURONTIN) 300 MG capsule Take 2 capsules twice a day by oral route for 90 days. 360 capsule 1    hydrALAZINE (APRESOLINE) 100 MG tablet Take 1 tablet (100 mg total) by mouth 2 (two) times daily. 180 tablet 1    hydrALAZINE (APRESOLINE) 25 MG tablet take 1 tablet by mouth every 8 hours 90 tablet 0    insulin detemir U-100, Levemir, (LEVEMIR FLEXTOUCH U100 INSULIN) 100 unit/mL (3 mL) InPn pen Inject 22 Units into the skin every evening. Patient not needing insulin in-patient. Follow-up with PCP for hospital follow-up and restarting the medication. Or restart medication if glucose >200 consistently resume home insulin regimen. Or if glucose is persistently less than 100, decrease by 5 units until following up with PCP 15 mL 1    insulin glargine U-100, Lantus, (LANTUS) 100 unit/mL injection inject 13 unit subcutaneously 2 times daily 10 mL 0    isosorbide mononitrate (IMDUR) 30 MG 24 hr tablet Take 1 tablet (30 mg total) by mouth once daily. 90 tablet 1    lancets (TRUEPLUS LANCETS) 33 gauge Misc Use 1 to check blood glucose three times daily 100 each 11    lancets 33 gauge Misc Use to test twice daily 100 each 5    levETIRAcetam (KEPPRA) 500 MG Tab Take 1 tablet (500 mg total) by mouth 2 (two) times daily. 60 tablet 11    ondansetron (ZOFRAN-ODT) 4 MG TbDL Place 1 tablet (4 mg) on or under the tongue every 8 hours for 10 days. 24 tablet 2    oxyCODONE-acetaminophen (PERCOCET)  mg per tablet Take 1 tablet by mouth every 4 (four) hours as needed for Pain. 42 tablet 0    pen needle, diabetic 31 gauge x  "3/16" Ndle Use as directed to inject insulin 5 times daily 100 each 11    pen needle, diabetic 31 gauge x 3/16" Ndle Use as directed with insulin once daily 100 each 0    prednisoLONE acetate (PRED FORTE) 1 % DrpS Place 1 drop into the left eye 2 (two) times daily. 5 mL 3    prednisoLONE acetate (PRED FORTE) 1 % DrpS Place 1 drop in left eye 4 times daily for 5 days 5 mL 0    predniSONE (DELTASONE) 20 MG tablet take 3 tablets Oral Daily,x30 day(s) 90 tablet 0    sevelamer carbonate (RENVELA) 800 mg Tab Take 1 tablet (800 mg total) by mouth 3 (three) times daily with meals. 180 tablet 11    sucralfate (CARAFATE) 100 mg/mL suspension Take 10 mL 4 times a day by oral route before meals for 30 days. 1200 mL 1    tobramycin-dexAMETHasone 0.3-0.1% (TOBRADEX) 0.3-0.1 % DrpS 1 drop Eye-Left every 6 hours for 5 day(s) 5 mL 0    [DISCONTINUED] atenoloL (TENORMIN) 50 MG tablet Take 1 tablet (50 mg total) by mouth every other day. 45 tablet 3    [DISCONTINUED] omeprazole (PRILOSEC) 20 MG capsule Take 2 capsules (40 mg total) by mouth once daily. for 10 days 20 capsule 0       Scheduled meds:   amLODIPine  5 mg Oral Daily    carvediloL  25 mg Oral BID    hydrALAZINE  100 mg Oral BID    insulin glargine U-100  10 Units Subcutaneous BID    levETIRAcetam  500 mg Oral BID    mupirocin   Nasal BID    sevelamer carbonate  800 mg Oral TID WM       Infusions:      PRN meds:    Current Facility-Administered Medications:     0.9%  NaCl infusion (for blood administration), , Intravenous, Q24H PRN    acetaminophen, 650 mg, Oral, Q4H PRN    albuterol-ipratropium, 3 mL, Nebulization, Q4H PRN    aluminum-magnesium hydroxide-simethicone, 30 mL, Oral, QID PRN    dextrose 10%, 12.5 g, Intravenous, PRN    dextrose 10%, 25 g, Intravenous, PRN    glucagon (human recombinant), 1 mg, Intramuscular, PRN    glucose, 16 g, Oral, PRN    glucose, 24 g, Oral, PRN    insulin aspart U-100, 1-10 Units, Subcutaneous, QID (AC + HS) PRN    melatonin, 9 mg, Oral, " Nightly PRN    naloxone, 0.02 mg, Intravenous, PRN    ondansetron, 4 mg, Intravenous, Q8H PRN    oxyCODONE, 5 mg, Oral, Q6H PRN    simethicone, 1 tablet, Oral, QID PRN    sodium chloride 0.9%, 10 mL, Intravenous, Q8H PRN    Review of Systems:    OBJECTIVE:     Vital Signs and IO:  Temp:  [98 °F (36.7 °C)-98.6 °F (37 °C)]   Pulse:  [75-82]   Resp:  [16-18]   BP: (108-149)/(65-92)   SpO2:  [90 %-96 %]   I/O last 3 completed shifts:  In: 1438 [P.O.:660; Blood:278; Other:500]  Out: 4200 [Other:4200]  Wt Readings from Last 5 Encounters:   05/13/24 57 kg (125 lb 10.6 oz)   03/30/24 53.1 kg (117 lb)   03/08/24 53.1 kg (117 lb)   03/06/24 52.6 kg (116 lb)   02/28/24 52.7 kg (116 lb 2.9 oz)     Body mass index is 22.98 kg/m².    Physical Exam  Constitutional:       General: Patient is in some acute distress.     Appearance: Patient is well-developed. She is not diaphoretic.   HENT:      Head: Normocephalic and atraumatic.      Mouth/Throat: Mucous membranes are moist.   Eyes:      General: No scleral icterus.     Pupils: Pupils are equal, round, and reactive to light.   Cardiovascular:      Rate and Rhythm: Normal rate and regular rhythm.   Pulmonary:      Effort: Pulmonary effort is normal. No respiratory distress.      Breath sounds: No stridor.   Abdominal:      General: There is no distension.      Palpations: Abdomen is soft.   Musculoskeletal:         General: No deformity. Normal range of motion.      Cervical back: Neck supple.   Skin:     General: Skin is warm and dry.      Findings: No rash present. No erythema.   Neurological:      Mental Status: Patient is alert and oriented to person, place, and time.      Cranial Nerves: No cranial nerve deficit.   Psychiatric:         Behavior: Behavior normal.     Laboratory:  Recent Labs   Lab 05/11/24  1341 05/12/24  0026 05/12/24  0425 05/13/24  0458     --  139 135*   K 6.6* 4.3 4.5 5.5*     --  98 95   CO2 17*  --  25 28   BUN 94*  --  41* 63*   CREATININE  9.5*  --  5.8* 7.4*   *  --  265* 194*       Recent Labs   Lab 05/11/24  1341 05/12/24  0425 05/13/24  0458   CALCIUM 7.4* 9.2 8.8   ALBUMIN 3.7 3.4* 3.8   PHOS  --  5.8* 6.0*   MG  --  2.2 2.4       Recent Labs   Lab 07/08/22  0516   PTH, Intact 289.8 H       Recent Labs   Lab 05/11/24  2159   POCTGLUCOSE 120*       Recent Labs   Lab 12/23/22  1413 03/03/23  0547 08/01/23  0813   Hemoglobin A1C 7.5 H 5.8 5.8       Recent Labs   Lab 05/11/24  2259 05/12/24 0425 05/13/24  0458   WBC 8.76 8.58 8.45   HGB 9.4* 9.2* 8.6*   HCT 27.0* 26.5* 26.2*   * 104* 147*   MCV 88 87 92   MCHC 34.8 34.7 32.8   MONO 10.2  0.9 10.5  0.9 14.9  1.3*   EOSINOPHIL 1.0 1.7 2.8       Recent Labs   Lab 05/11/24  1341 05/12/24  0425 05/13/24  0458   BILITOT 2.6* 2.2* 1.8*   PROT 6.6 6.6 6.2   ALBUMIN 3.7 3.4* 3.8   ALKPHOS 227* 227* 188*   ALT 69* 56* 36   AST 56* 35 21       Recent Labs   Lab 03/16/22  1848 05/15/22  2022 02/05/23  0156 09/19/23  1422 10/13/23  1001   Color, UA Pale Yellow   < > Yellow Yellow Yellow   Appearance, UA  --    < > Clear Clear Clear   pH, UA  --    < > >8.0 A >8.0 A 8.0   Specific Gravity, UA  --    < > 1.020 1.020 1.015   Protein, UA  --    < > 3+ A 4+ 4+   Glucose, UA 50 mg/dL A   < > 2+ A 4+ A 3+ A   Ketones, UA  --    < > 1+ A Trace A Trace A   Urobilinogen, UA Normal   < > Negative Negative Negative   Bilirubin (UA) Negative   < > 2+ A Negative 1+ A   Occult Blood UA  --    < > 3+ A 2+ A Negative   Blood, UA 0.03 mg/dL A   < >  --   --   --    Nitrite, UA  --    < > Negative Negative Negative   RBC, UA  --    < > 15 H 17 H 8 H   WBC, UA  --    < > 1 2 5   Bacteria  --    < > Rare Negative Negative   Mucus, UA Rare A  --   --   --   --    Hyaline Casts, UA  --    < > 2 A 1 2 A    < > = values in this interval not displayed.       Recent Labs   Lab 10/03/23  2105 10/04/23  0438 10/15/23  0353 02/24/24  1855   POC PH 7.290 LL 7.305 L 7.239 LL  --    POC PCO2 45.2 H 43.6 37.8  --    POC HCO3  21.7 L 21.7 L 16.1 L  --    POC PO2 110 H 92 37 LL  --    POC SATURATED O2 98 96 60  --    POC BE -5 -5 -11  --    Sample ARTERIAL ARTERIAL VENOUS PIETER       Microbiology Results (last 7 days)       ** No results found for the last 168 hours. **            ASSESSMENT/PLAN:     ESRD on HD TTS via AVF  hyperkalemia  Next HD per schedule.  Continue current dialysis prescription.  Renal diet - low K, low phos. Add Lokelma.  No IVs or BP checks on access and/or non-dominant arm.    Anemia of CKD + acute anemia  Transfuse 2 PRBC with HD on 5/11  Will provide SHELLEY and/or IV iron PRN.    MBD / Secondary HPT  Ca, Phos, PTH and vitamin D levels are acceptable.   Phos binders, vitamin D and analogues, calcimimetics will be given as needed.    HTN  BP seem controlled.   Tolerate asymptomatic HTN up to -160.  Continue home meds.  Low sodium diet.    Thank you for allowing us to participate in the care of your patient!   We will follow the patient and provide recommendations as needed.    Patient care time was spent personally by me on the following activities:     Obtaining a history.  Examination of patient.  Providing medical care at the patients bedside.  Developing a treatment plan with patient or surrogate and bedside caregivers.  Ordering and reviewing laboratory studies, radiographic studies, pulse oximetry.  Ordering and performing treatments and interventions.  Evaluation of patient's response to treatment.  Discussions with consultants while on the unit and immediately available to the patient.  Re-evaluation of the patient's condition.  Documentation in the medical record.     Mando Benitez MD    [facility]  [address] [place] [address]  [place] [address]    [phone] - tel  [phone] - fax    5/13/2024

## 2024-05-13 NOTE — RESPIRATORY THERAPY
05/13/24 0800   Patient Assessment/Suction   Level of Consciousness (AVPU) responds to voice   Respiratory Effort Unlabored   PRE-TX-O2   Device (Oxygen Therapy) room air  (Placed pt on NC 2LPM at this time)   SpO2 (!) 86 %   Pulse Oximetry Type Intermittent   $ Pulse Oximetry - Multiple Charge Pulse Oximetry - Multiple   Pulse 77   Resp 16

## 2024-05-15 LAB
OHS QRS DURATION: 90 MS
OHS QTC CALCULATION: 540 MS

## 2024-06-19 ENCOUNTER — TELEPHONE (OUTPATIENT)
Dept: TRANSPLANT | Facility: CLINIC | Age: 34
End: 2024-06-19
Payer: MEDICAID

## 2024-06-22 ENCOUNTER — HOSPITAL ENCOUNTER (OUTPATIENT)
Facility: HOSPITAL | Age: 34
Discharge: HOME | End: 2024-06-23
Attending: STUDENT IN AN ORGANIZED HEALTH CARE EDUCATION/TRAINING PROGRAM | Admitting: INTERNAL MEDICINE
Payer: MEDICAID

## 2024-06-22 DIAGNOSIS — N18.6 ESRD WITH ANEMIA: ICD-10-CM

## 2024-06-22 DIAGNOSIS — N18.6 ESRD NEEDING DIALYSIS: ICD-10-CM

## 2024-06-22 DIAGNOSIS — Z91.158 DIALYSIS PATIENT, NONCOMPLIANT: ICD-10-CM

## 2024-06-22 DIAGNOSIS — R07.9 CHEST PAIN: ICD-10-CM

## 2024-06-22 DIAGNOSIS — I16.1 HYPERTENSIVE EMERGENCY: Primary | ICD-10-CM

## 2024-06-22 DIAGNOSIS — D63.1 ESRD WITH ANEMIA: ICD-10-CM

## 2024-06-22 DIAGNOSIS — Z99.2 ESRD NEEDING DIALYSIS: ICD-10-CM

## 2024-06-22 PROBLEM — D64.9 ANEMIA: Status: ACTIVE | Noted: 2024-06-22

## 2024-06-22 PROBLEM — Z86.39 HISTORY OF DIABETES MELLITUS, TYPE II: Status: ACTIVE | Noted: 2024-06-22

## 2024-06-22 PROBLEM — R10.9 ABDOMINAL PAIN: Status: ACTIVE | Noted: 2024-06-22

## 2024-06-22 PROBLEM — R56.9 SEIZURE: Status: ACTIVE | Noted: 2024-06-22

## 2024-06-22 LAB
ALBUMIN SERPL BCP-MCNC: 4.1 G/DL (ref 3.5–5.2)
ANION GAP SERPL CALC-SCNC: 22 MMOL/L (ref 8–16)
BASOPHILS # BLD AUTO: 0.04 K/UL (ref 0–0.2)
BASOPHILS NFR BLD: 0.4 % (ref 0–1.9)
BNP SERPL-MCNC: >4700 PG/ML (ref 0–99)
BUN SERPL-MCNC: 45 MG/DL (ref 6–20)
CALCIUM SERPL-MCNC: 8.7 MG/DL (ref 8.7–10.5)
CHLORIDE SERPL-SCNC: 99 MMOL/L (ref 95–110)
CO2 SERPL-SCNC: 24 MMOL/L (ref 23–29)
CREAT SERPL-MCNC: 5.9 MG/DL (ref 0.5–1.4)
DIFFERENTIAL METHOD BLD: ABNORMAL
EOSINOPHIL # BLD AUTO: 0 K/UL (ref 0–0.5)
EOSINOPHIL NFR BLD: 0.2 % (ref 0–8)
ERYTHROCYTE [DISTWIDTH] IN BLOOD BY AUTOMATED COUNT: 19.9 % (ref 11.5–14.5)
EST. GFR  (NO RACE VARIABLE): 9 ML/MIN/1.73 M^2
GLUCOSE SERPL-MCNC: 159 MG/DL (ref 70–110)
HCT VFR BLD AUTO: 21.8 % (ref 37–48.5)
HGB BLD-MCNC: 7.1 G/DL (ref 12–16)
IMM GRANULOCYTES # BLD AUTO: 0.05 K/UL (ref 0–0.04)
IMM GRANULOCYTES NFR BLD AUTO: 0.5 % (ref 0–0.5)
LIPASE SERPL-CCNC: 18 U/L (ref 4–60)
LYMPHOCYTES # BLD AUTO: 1.1 K/UL (ref 1–4.8)
LYMPHOCYTES NFR BLD: 9.5 % (ref 18–48)
MCH RBC QN AUTO: 30.1 PG (ref 27–31)
MCHC RBC AUTO-ENTMCNC: 32.6 G/DL (ref 32–36)
MCV RBC AUTO: 92 FL (ref 82–98)
MONOCYTES # BLD AUTO: 0.8 K/UL (ref 0.3–1)
MONOCYTES NFR BLD: 7.2 % (ref 4–15)
NEUTROPHILS # BLD AUTO: 9.2 K/UL (ref 1.8–7.7)
NEUTROPHILS NFR BLD: 82.2 % (ref 38–73)
NRBC BLD-RTO: 0 /100 WBC
PHOSPHATE SERPL-MCNC: 7.5 MG/DL (ref 2.7–4.5)
PLATELET # BLD AUTO: 126 K/UL (ref 150–450)
PMV BLD AUTO: 11 FL (ref 9.2–12.9)
POTASSIUM SERPL-SCNC: 4.6 MMOL/L (ref 3.5–5.1)
RBC # BLD AUTO: 2.36 M/UL (ref 4–5.4)
SODIUM SERPL-SCNC: 145 MMOL/L (ref 136–145)
TROPONIN I SERPL HS-MCNC: 67.1 PG/ML (ref 0–14.9)
WBC # BLD AUTO: 11.11 K/UL (ref 3.9–12.7)

## 2024-06-22 PROCEDURE — 96375 TX/PRO/DX INJ NEW DRUG ADDON: CPT

## 2024-06-22 PROCEDURE — 84484 ASSAY OF TROPONIN QUANT: CPT | Performed by: STUDENT IN AN ORGANIZED HEALTH CARE EDUCATION/TRAINING PROGRAM

## 2024-06-22 PROCEDURE — G0378 HOSPITAL OBSERVATION PER HR: HCPCS

## 2024-06-22 PROCEDURE — 80069 RENAL FUNCTION PANEL: CPT | Performed by: STUDENT IN AN ORGANIZED HEALTH CARE EDUCATION/TRAINING PROGRAM

## 2024-06-22 PROCEDURE — 63600175 PHARM REV CODE 636 W HCPCS: Performed by: INTERNAL MEDICINE

## 2024-06-22 PROCEDURE — 96376 TX/PRO/DX INJ SAME DRUG ADON: CPT | Mod: 59

## 2024-06-22 PROCEDURE — 25000003 PHARM REV CODE 250: Performed by: INTERNAL MEDICINE

## 2024-06-22 PROCEDURE — G0257 UNSCHED DIALYSIS ESRD PT HOS: HCPCS

## 2024-06-22 PROCEDURE — 99291 CRITICAL CARE FIRST HOUR: CPT | Mod: 25

## 2024-06-22 PROCEDURE — 83690 ASSAY OF LIPASE: CPT | Performed by: STUDENT IN AN ORGANIZED HEALTH CARE EDUCATION/TRAINING PROGRAM

## 2024-06-22 PROCEDURE — 90935 HEMODIALYSIS ONE EVALUATION: CPT

## 2024-06-22 PROCEDURE — 83880 ASSAY OF NATRIURETIC PEPTIDE: CPT | Performed by: STUDENT IN AN ORGANIZED HEALTH CARE EDUCATION/TRAINING PROGRAM

## 2024-06-22 PROCEDURE — 96372 THER/PROPH/DIAG INJ SC/IM: CPT | Mod: 59 | Performed by: INTERNAL MEDICINE

## 2024-06-22 PROCEDURE — 96374 THER/PROPH/DIAG INJ IV PUSH: CPT

## 2024-06-22 PROCEDURE — 85025 COMPLETE CBC W/AUTO DIFF WBC: CPT | Performed by: STUDENT IN AN ORGANIZED HEALTH CARE EDUCATION/TRAINING PROGRAM

## 2024-06-22 PROCEDURE — 93005 ELECTROCARDIOGRAM TRACING: CPT | Performed by: INTERNAL MEDICINE

## 2024-06-22 PROCEDURE — 63600175 PHARM REV CODE 636 W HCPCS: Performed by: STUDENT IN AN ORGANIZED HEALTH CARE EDUCATION/TRAINING PROGRAM

## 2024-06-22 PROCEDURE — 93010 ELECTROCARDIOGRAM REPORT: CPT | Mod: ,,, | Performed by: INTERNAL MEDICINE

## 2024-06-22 PROCEDURE — 25000003 PHARM REV CODE 250: Performed by: STUDENT IN AN ORGANIZED HEALTH CARE EDUCATION/TRAINING PROGRAM

## 2024-06-22 RX ORDER — PANTOPRAZOLE SODIUM 40 MG/1
40 TABLET, DELAYED RELEASE ORAL DAILY
Status: DISCONTINUED | OUTPATIENT
Start: 2024-06-22 | End: 2024-06-23 | Stop reason: HOSPADM

## 2024-06-22 RX ORDER — HYDROMORPHONE HYDROCHLORIDE 1 MG/ML
1 INJECTION, SOLUTION INTRAMUSCULAR; INTRAVENOUS; SUBCUTANEOUS
Status: COMPLETED | OUTPATIENT
Start: 2024-06-22 | End: 2024-06-22

## 2024-06-22 RX ORDER — ACETAMINOPHEN 325 MG/1
650 TABLET ORAL EVERY 4 HOURS PRN
Status: DISCONTINUED | OUTPATIENT
Start: 2024-06-22 | End: 2024-06-23 | Stop reason: HOSPADM

## 2024-06-22 RX ORDER — HYDRALAZINE HYDROCHLORIDE 20 MG/ML
10 INJECTION INTRAMUSCULAR; INTRAVENOUS EVERY 4 HOURS PRN
Status: DISCONTINUED | OUTPATIENT
Start: 2024-06-22 | End: 2024-06-23 | Stop reason: HOSPADM

## 2024-06-22 RX ORDER — HYDROCODONE BITARTRATE AND ACETAMINOPHEN 5; 325 MG/1; MG/1
1 TABLET ORAL EVERY 6 HOURS PRN
Status: DISCONTINUED | OUTPATIENT
Start: 2024-06-22 | End: 2024-06-23 | Stop reason: HOSPADM

## 2024-06-22 RX ORDER — ONDANSETRON HYDROCHLORIDE 2 MG/ML
4 INJECTION, SOLUTION INTRAVENOUS EVERY 6 HOURS PRN
Status: DISCONTINUED | OUTPATIENT
Start: 2024-06-22 | End: 2024-06-23 | Stop reason: HOSPADM

## 2024-06-22 RX ORDER — ISOSORBIDE MONONITRATE 30 MG/1
30 TABLET, EXTENDED RELEASE ORAL DAILY
Status: DISCONTINUED | OUTPATIENT
Start: 2024-06-22 | End: 2024-06-23 | Stop reason: HOSPADM

## 2024-06-22 RX ORDER — ACETAMINOPHEN 325 MG/1
650 TABLET ORAL EVERY 8 HOURS PRN
Status: DISCONTINUED | OUTPATIENT
Start: 2024-06-22 | End: 2024-06-23 | Stop reason: HOSPADM

## 2024-06-22 RX ORDER — TALC
6 POWDER (GRAM) TOPICAL NIGHTLY PRN
Status: DISCONTINUED | OUTPATIENT
Start: 2024-06-22 | End: 2024-06-23 | Stop reason: HOSPADM

## 2024-06-22 RX ORDER — LEVETIRACETAM 500 MG/1
500 TABLET ORAL 2 TIMES DAILY
Status: DISCONTINUED | OUTPATIENT
Start: 2024-06-22 | End: 2024-06-23 | Stop reason: HOSPADM

## 2024-06-22 RX ORDER — AMLODIPINE BESYLATE 5 MG/1
5 TABLET ORAL DAILY
Status: DISCONTINUED | OUTPATIENT
Start: 2024-06-22 | End: 2024-06-23 | Stop reason: HOSPADM

## 2024-06-22 RX ORDER — HYDRALAZINE HYDROCHLORIDE 25 MG/1
100 TABLET, FILM COATED ORAL 2 TIMES DAILY
Status: DISCONTINUED | OUTPATIENT
Start: 2024-06-22 | End: 2024-06-23 | Stop reason: HOSPADM

## 2024-06-22 RX ORDER — HYDROMORPHONE HYDROCHLORIDE 1 MG/ML
0.5 INJECTION, SOLUTION INTRAMUSCULAR; INTRAVENOUS; SUBCUTANEOUS EVERY 6 HOURS PRN
Status: DISCONTINUED | OUTPATIENT
Start: 2024-06-22 | End: 2024-06-23 | Stop reason: HOSPADM

## 2024-06-22 RX ORDER — HYDROMORPHONE HYDROCHLORIDE 1 MG/ML
1 INJECTION, SOLUTION INTRAMUSCULAR; INTRAVENOUS; SUBCUTANEOUS
Status: DISCONTINUED | OUTPATIENT
Start: 2024-06-22 | End: 2024-06-22

## 2024-06-22 RX ORDER — CARVEDILOL 3.12 MG/1
12.5 TABLET ORAL 2 TIMES DAILY
Status: DISCONTINUED | OUTPATIENT
Start: 2024-06-22 | End: 2024-06-23 | Stop reason: HOSPADM

## 2024-06-22 RX ORDER — ATROPINE SULFATE 10 MG/ML
1 SOLUTION/ DROPS OPHTHALMIC 2 TIMES DAILY
Status: DISCONTINUED | OUTPATIENT
Start: 2024-06-22 | End: 2024-06-23 | Stop reason: HOSPADM

## 2024-06-22 RX ORDER — CLONIDINE HYDROCHLORIDE 0.1 MG/1
0.1 TABLET ORAL
Status: COMPLETED | OUTPATIENT
Start: 2024-06-22 | End: 2024-06-22

## 2024-06-22 RX ORDER — NALOXONE HCL 0.4 MG/ML
0.02 VIAL (ML) INJECTION
Status: DISCONTINUED | OUTPATIENT
Start: 2024-06-22 | End: 2024-06-23 | Stop reason: HOSPADM

## 2024-06-22 RX ORDER — ALUMINUM HYDROXIDE, MAGNESIUM HYDROXIDE, AND SIMETHICONE 1200; 120; 1200 MG/30ML; MG/30ML; MG/30ML
30 SUSPENSION ORAL 4 TIMES DAILY PRN
Status: DISCONTINUED | OUTPATIENT
Start: 2024-06-22 | End: 2024-06-23 | Stop reason: HOSPADM

## 2024-06-22 RX ORDER — HEPARIN SODIUM 5000 [USP'U]/ML
5000 INJECTION, SOLUTION INTRAVENOUS; SUBCUTANEOUS EVERY 8 HOURS
Status: DISCONTINUED | OUTPATIENT
Start: 2024-06-22 | End: 2024-06-23 | Stop reason: HOSPADM

## 2024-06-22 RX ADMIN — HEPARIN SODIUM 5000 UNITS: 5000 INJECTION, SOLUTION INTRAVENOUS; SUBCUTANEOUS at 02:06

## 2024-06-22 RX ADMIN — LEVETIRACETAM 500 MG: 500 TABLET, FILM COATED ORAL at 09:06

## 2024-06-22 RX ADMIN — HYDRALAZINE HYDROCHLORIDE 100 MG: 25 TABLET ORAL at 09:06

## 2024-06-22 RX ADMIN — CARVEDILOL 12.5 MG: 3.12 TABLET, FILM COATED ORAL at 09:06

## 2024-06-22 RX ADMIN — HYDROMORPHONE HYDROCHLORIDE 0.5 MG: 0.5 INJECTION, SOLUTION INTRAMUSCULAR; INTRAVENOUS; SUBCUTANEOUS at 02:06

## 2024-06-22 RX ADMIN — CLONIDINE HYDROCHLORIDE 0.1 MG: 0.1 TABLET ORAL at 11:06

## 2024-06-22 RX ADMIN — HYDROMORPHONE HYDROCHLORIDE 1 MG: 0.5 INJECTION, SOLUTION INTRAMUSCULAR; INTRAVENOUS; SUBCUTANEOUS at 11:06

## 2024-06-22 RX ADMIN — HYDRALAZINE HYDROCHLORIDE 10 MG: 20 INJECTION, SOLUTION INTRAMUSCULAR; INTRAVENOUS at 05:06

## 2024-06-22 RX ADMIN — PANTOPRAZOLE SODIUM 40 MG: 40 TABLET, DELAYED RELEASE ORAL at 02:06

## 2024-06-22 RX ADMIN — ATROPINE SULFATE 1 DROP: 10 SOLUTION/ DROPS OPHTHALMIC at 09:06

## 2024-06-22 RX ADMIN — NITROGLYCERIN 1 INCH: 20 OINTMENT TOPICAL at 11:06

## 2024-06-22 RX ADMIN — HEPARIN SODIUM 5000 UNITS: 5000 INJECTION, SOLUTION INTRAVENOUS; SUBCUTANEOUS at 09:06

## 2024-06-22 NOTE — PROGRESS NOTES
Verified patient consent prior to treatment  HD treatment administered per policies and procedures  Net UF removed 3.5 L, Patient tolerated well, BP elevated. Reported to primary nurse  Report given to SHAILA Venegas        06/22/24 1820   Required for all Hemodialysis Patients   Hepatitis Status negative   Handoff Report   Received From SHAILA Sams   Given To SHAILA Venegas   Treatment Type   Treatment Type Maintenance   Vital Signs   Temp 98 °F (36.7 °C)   Temp Source Oral   Pulse 81   Heart Rate Source Monitor   Resp 18   SpO2 100 %   Device (Oxygen Therapy) nasal cannula   BP (!) 216/108   BP Location Right arm   BP Method Automatic   Patient Position Lying   Assessments (Pre/Post)   Consent Obtained yes   Safety vein preservation armband present   Date Hepatitis Profile Obtained 02/27/24   Transport Modality bed   Level of Consciousness (AVPU) alert   Pain   Preferred Pain Scale number (Numeric Rating Pain Scale)   Comfort/Acceptable Pain Level 0   Pain Rating (0-10): Rest 0   Pain Rating (0-10): Activity 0   Pre-Hemodialysis Assessment   Patient Status Departed   Post-Hemodialysis Assessment   Rinseback Volume (mL) 250 mL   Blood Volume Processed (Liters) 63 L   Dialyzer Clearance Lightly streaked   Duration of Treatment 180 minutes   Total UF (mL) 4000 mL   Net Fluid Removal 3500   Patient Response to Treatment Tolerated treatment well   Post-Treatment Weight 49.1 kg (108 lb 3.9 oz)   Treatment Weight Change -3.5   Post-Hemodialysis Comments HD treatment administered per policies and procedures.

## 2024-06-22 NOTE — ASSESSMENT & PLAN NOTE
This year so far and last year   She had numerous  IP admissions in this hospital alone  19 CT abdomen/pelvis  CTA chest x 5  Numerous CXR/KUB/EGD /Colonoscopy in various hospitals  Pt shops hospitals in [place] very frequently     She will most likely develop radiation associated complications in near future

## 2024-06-22 NOTE — ED PROVIDER NOTES
Encounter Date: 2024       History     Chief Complaint   Patient presents with    Abdominal Pain     Flank pain and back pain since yesterday . Did not do diaylsis today     34-year-old female presents with generalized discomfort.  History of end-stage renal disease, other comorbidities.  No trauma, fever or chills, no chest pain or shortness a breath, patient reports she missed dialysis today.    The history is provided by the patient.     Review of patient's allergies indicates:   Allergen Reactions    Droperidol Hives    Penicillins Hives     Past Medical History:   Diagnosis Date    ESRD on hemodialysis 2022    Gastritis 2022    EGD was 21    Gastroparesis 2022    has not had Emptying study    Heart failure with preserved ejection fraction 2022    EF 55% on 3/22    History of supraventricular tachycardia     Hyperkalemia 2022    Hypertensive emergency 2022    Sickle cell trait 2022    Type 2 diabetes mellitus      Past Surgical History:   Procedure Laterality Date     SECTION      x 3    COLONOSCOPY      COLONOSCOPY N/A 2022    Procedure: COLONOSCOPY;  Surgeon: Jagdeep Cedeno MD;  Location: [facility];  Service: Endoscopy;  Laterality: N/A;    DESTRUCTION, CILIARY BODY, USING LASER Left 3/8/2024    Procedure: Cyclophotocoagulation G-probe, retrobulbar chlorpromazine left eye;  Surgeon: Fidel Wise MD;  Location: [address];  Service: Ophthalmology;  Laterality: Left;    ENDOSCOPIC ULTRASOUND OF UPPER GASTROINTESTINAL TRACT N/A 2023    Procedure: ULTRASOUND, UPPER GI TRACT, ENDOSCOPIC;  Surgeon: Micky Paredes III, MD;  Location: [facility];  Service: Endoscopy;  Laterality: N/A;    ESOPHAGOGASTRODUODENOSCOPY N/A 10/18/2019    Procedure: ESOPHAGOGASTRODUODENOSCOPY (EGD);  Surgeon: Gianluca Mendez MD;  Location: [facility];  Service: Endoscopy;  Laterality: N/A;    ESOPHAGOGASTRODUODENOSCOPY N/A 2022    Procedure: EGD  (ESOPHAGOGASTRODUODENOSCOPY);  Surgeon: Micky Paredes III, MD;  Location: [facility] ENDO;  Service: Endoscopy;  Laterality: N/A;    ESOPHAGOGASTRODUODENOSCOPY N/A 12/5/2022    Procedure: EGD (ESOPHAGOGASTRODUODENOSCOPY);  Surgeon: Marcelo Zhong MD;  Location: [facility] ENDO;  Service: Endoscopy;  Laterality: N/A;    INSERTION, CATHETER, TUNNELED N/A 6/17/2023    Procedure: Insertion,catheter,tunneled;  Surgeon: Carlos Thurman Jr., MD;  Location: [facility] OR;  Service: General;  Laterality: N/A;    LAPAROSCOPIC CHOLECYSTECTOMY N/A 07/30/2022    Procedure: CHOLECYSTECTOMY, LAPAROSCOPIC;  Surgeon: Grey Perez MD;  Location: [facility] OR;  Service: General;  Laterality: N/A;    PLACEMENT OF DUAL-LUMEN VASCULAR CATHETER Left 07/12/2022    Procedure: INSERTION-CATHETER-JOSEPH;  Surgeon: Dionte Gan MD;  Location: [facility] OR;  Service: General;  Laterality: Left;    PLACEMENT OF DUAL-LUMEN VASCULAR CATHETER Right 07/26/2022    Procedure: INSERTION-CATHETER-Hemosplit;  Surgeon: Dionte Gan MD;  Location: [facility] OR;  Service: General;  Laterality: Right;     Family History   Problem Relation Name Age of Onset    Diabetes Mother      Diabetes Father       Social History     Tobacco Use    Smoking status: Never    Smokeless tobacco: Never   Substance Use Topics    Alcohol use: Not Currently    Drug use: No     Review of Systems   All other systems reviewed and are negative.      Physical Exam     Initial Vitals [06/22/24 1107]   BP Pulse Resp Temp SpO2   (!) 241/105 100 20 98.8 °F (37.1 °C) 96 %      MAP       --         Physical Exam    Nursing note and vitals reviewed.  Constitutional:   Patient appears uncomfortable, writhing around   HENT:   Head: Normocephalic.   Eyes: No scleral icterus.   Cardiovascular:  Normal rate.           Pulmonary/Chest: Effort normal. No stridor. No respiratory distress.   Abdominal:   No abdominal tenderness to palpation There is no guarding.     Neurological: She is alert.   Skin: No  rash noted. No erythema.         ED Course   Critical Care    Date/Time: 6/22/2024 12:34 PM    Performed by: Henri Cabral Jr., DO  Authorized by: Henri Cabral Jr., DO  Direct patient critical care time: 10 minutes  Ordering / reviewing critical care time: 10 minutes  Documentation critical care time: 10 minutes  Consulting other physicians critical care time: 5 minutes  Total critical care time (exclusive of procedural time) : 35 minutes        Labs Reviewed   CBC W/ AUTO DIFFERENTIAL - Abnormal; Notable for the following components:       Result Value    RBC 2.36 (*)     Hemoglobin 7.1 (*)     Hematocrit 21.8 (*)     RDW 19.9 (*)     Platelets 126 (*)     Gran # (ANC) 9.2 (*)     Immature Grans (Abs) 0.05 (*)     Gran % 82.2 (*)     Lymph % 9.5 (*)     All other components within normal limits   TROPONIN I HIGH SENSITIVITY - Abnormal; Notable for the following components:    Troponin I High Sensitivity 67.1 (*)     All other components within normal limits   B-TYPE NATRIURETIC PEPTIDE - Abnormal; Notable for the following components:    BNP >4,700 (*)     All other components within normal limits   RENAL FUNCTION PANEL - Abnormal; Notable for the following components:    Glucose 159 (*)     BUN 45 (*)     Creatinine 5.9 (*)     Phosphorus 7.5 (*)     eGFR 9.0 (*)     Anion Gap 22 (*)     All other components within normal limits   LIPASE          Imaging Results              X-Ray Chest AP Portable (Final result)  Result time 06/22/24 11:33:21      Final result by Zaid Wilson MD (06/22/24 11:33:21)                   Impression:      1. Stable enlargement of the cardiac silhouette.  No acute pulmonary process.      Electronically signed by: Zaid Wilson  Date:    06/22/2024  Time:    11:33               Narrative:    EXAMINATION:  XR CHEST AP PORTABLE    CLINICAL HISTORY:  Chest Pain;    COMPARISON:  February 2024    FINDINGS:  AP view demonstrates stable enlargement of the cardiac  silhouette.  This may be on the basis of cardiomegaly, pericardial effusion, or a combination there of.  The mediastinum is unremarkable.  The lungs are clear.  No acute osseous abnormalities are identified.                                       Medications   amLODIPine tablet 5 mg (has no administration in time range)   atropine 1% ophthalmic solution 1 drop (has no administration in time range)   carvediloL tablet 12.5 mg (has no administration in time range)   hydrALAZINE tablet 100 mg (has no administration in time range)   isosorbide mononitrate 24 hr tablet 30 mg (has no administration in time range)   levETIRAcetam tablet 500 mg (has no administration in time range)   melatonin tablet 6 mg (has no administration in time range)   ondansetron injection 4 mg (has no administration in time range)   acetaminophen tablet 650 mg (has no administration in time range)   aluminum-magnesium hydroxide-simethicone 200-200-20 mg/5 mL suspension 30 mL (has no administration in time range)   acetaminophen tablet 650 mg (has no administration in time range)   HYDROcodone-acetaminophen 5-325 mg per tablet 1 tablet (has no administration in time range)   naloxone 0.4 mg/mL injection 0.02 mg (has no administration in time range)   heparin (porcine) injection 5,000 Units (has no administration in time range)   HYDROmorphone injection 0.5 mg (has no administration in time range)   hydrALAZINE injection 10 mg (has no administration in time range)   pantoprazole EC tablet 40 mg (has no administration in time range)   HYDROmorphone injection 1 mg (1 mg Intravenous Given 6/22/24 1147)   nitroGLYCERIN 2% TD oint ointment 1 inch (1 inch Topical (Top) Given 6/22/24 1146)   cloNIDine tablet 0.1 mg (0.1 mg Oral Given 6/22/24 1146)     Medical Decision Making  34-year-old female presents with elevated blood pressure reading, missed dialysis today.  Patient administered pain control for blood pressure control and supportive care, topical  nitroglycerin and clonidine.  Initial blood pressure 241/105.  Blood pressure improved, heart rate improved.  No severe hyperkalemia, patient has anemia hemoglobin 7.1.  Spoke with nephrologist who will dialyze patient today, spoke with hospitalist team who will admit for further management evaluation    Amount and/or Complexity of Data Reviewed  Labs: ordered. Decision-making details documented in ED Course.  Radiology: ordered and independent interpretation performed.     Details: Cardiomegaly  ECG/medicine tests: ordered and independent interpretation performed.     Details: Rate 89, QRS 80, , normal sinus rhythm, no STEMI, prolonged QT interval  Discussion of management or test interpretation with external provider(s): Spoke with Hospitalist Dr. Garsia, Will admit for further management and evaluation    Spoke with nephrologist Dr. Benitez- will dialyze today    Risk  Prescription drug management.  Decision regarding hospitalization.               ED Course as of 06/22/24 1317   Sat Jun 22, 2024   1131 Communicated with nephrologist Dr. Benitez- we will plan for dialysis today [KB]   1138 No signs of volume overload on chest x-ray. [KB]   1148 BP(!): 241/105 [KB]   1148 BP(!): 185/126 [KB]   1148 Blood pressure improved no interventions, interventions be administered now [KB]   1207 WBC: 11.11 [KB]   1207 Hemoglobin(!): 7.1 [KB]   1207 Hematocrit(!): 21.8 [KB]   1225 Glucose(!): 159 [KB]   1226 CO2: 24 [KB]   1226 BUN(!): 45 [KB]   1226 Creatinine(!): 5.9 [KB]   1226 Phosphorus Level(!): 7.5 [KB]   1226 eGFR(!): 9.0 [KB]   1226 Anion Gap(!): 22 [KB]   1226 BNP(!): >4,700 [KB]      ED Course User Index  [KB] Henri Cabral Jr.,                            Clinical Impression:  Final diagnoses:  [I16.1] Hypertensive emergency (Primary)  [N18.6, Z99.2] ESRD needing dialysis  [Z91.158] Dialysis patient, noncompliant          ED Disposition Condition    Observation                 Henri Cabral Jr.,  DO  06/22/24 1316       Henri Cabral Jr., DO  06/22/24 1313

## 2024-06-22 NOTE — CONSULTS
Nephrology Consult Note        Patient Name: Tabby Howard  MRN: [id number]    Patient Class: Emergency   Admission Date: 6/22/2024  Length of Stay: 0 days  Date of Service: 6/22/2024    Attending Physician: Henri Cabral Jr., *  Primary Care Provider: Melony Kim NP    Reason for Consult: esrd    SUBJECTIVE:     HPI: 34F with ERSD on HD presents with generalized discomfort. She missed dialysis today and will need to do HD.     Seen on HD today, tolerating well.    Review of Systems:  Constitutional:  Negative for chills, fever, malaise/fatigue and weight loss.   HENT:  Negative for hearing loss and nosebleeds.    Eyes:  Negative for blurred vision, double vision and photophobia.   Respiratory:  Negative for cough, shortness of breath and wheezing.    Cardiovascular:  Negative for chest pain, palpitations and leg swelling.   Gastrointestinal:  Negative for abdominal pain, constipation, diarrhea, heartburn, nausea and vomiting.   Genitourinary:  Negative for dysuria, frequency and urgency.   Musculoskeletal:  Negative for falls, joint pain and myalgias.   Skin:  Negative for itching and rash.   Neurological:  Negative for dizziness, speech change, focal weakness, loss of consciousness and headaches.   Endo/Heme/Allergies:  Does not bruise/bleed easily.   Psychiatric/Behavioral:  Negative for depression and substance abuse. The patient is not nervous/anxious.      ASSESSMENT/PLAN:     ESRD on HD MWF  via [facility]  Next dialysis per schedule or PRN.  Continue current dialysis prescription and adjust as needed.  Renal diet - low K, low phos as tolerated.  No IVs or BP checks on access and/or non-dominant arm.    Anemia of CKD  Hgb and HCT are acceptable. Monitor for now.  Will provide SHELLEY and/or IV iron as needed to keep Hgb 8-10 range.    MBD / Secondary HPT  Ca, Phos, PTH and vitamin D levels are acceptable.   Phos binders, vitamin D and analogues, calcimimetics will be given as needed.    HTN  Tolerate  "asymptomatic HTN up to -160.  Continue home meds.  Low sodium diet as tolerated.    Thank you for allowing us to participate in the care of your patient!   We will follow the patient and provide recommendations as needed.         Laboratory:  Recent Labs   Lab 06/22/24  1145      K 4.6   CL 99   CO2 24   BUN 45*   CREATININE 5.9*   *       Recent Labs   Lab 06/22/24  1145   CALCIUM 8.7   ALBUMIN 4.1   PHOS 7.5*       Recent Labs   Lab 07/08/22  0516   PTH, Intact 289.8 H       No results for input(s): "POCTGLUCOSE" in the last 168 hours.    Recent Labs   Lab 12/23/22  1413 03/03/23  0547 08/01/23  0813   Hemoglobin A1C 7.5 H 5.8 5.8       Recent Labs   Lab 06/22/24  1145   WBC 11.11   HGB 7.1*   HCT 21.8*   *   MCV 92   MCHC 32.6   MONO 7.2  0.8   EOSINOPHIL 0.2       Recent Labs   Lab 06/22/24  1145   ALBUMIN 4.1       Recent Labs   Lab 03/16/22  1848 05/15/22  2022 02/05/23  0156 09/19/23  1422 10/13/23  1001   Color, UA Pale Yellow   < > Yellow Yellow Yellow   Appearance, UA  --    < > Clear Clear Clear   pH, UA  --    < > >8.0 A >8.0 A 8.0   Specific Gravity, UA  --    < > 1.020 1.020 1.015   Protein, UA  --    < > 3+ A 4+ 4+   Glucose, UA 50 mg/dL A   < > 2+ A 4+ A 3+ A   Ketones, UA  --    < > 1+ A Trace A Trace A   Urobilinogen, UA Normal   < > Negative Negative Negative   Bilirubin (UA) Negative   < > 2+ A Negative 1+ A   Occult Blood UA  --    < > 3+ A 2+ A Negative   Blood, UA 0.03 mg/dL A   < >  --   --   --    Nitrite, UA  --    < > Negative Negative Negative   RBC, UA  --    < > 15 H 17 H 8 H   WBC, UA  --    < > 1 2 5   Bacteria  --    < > Rare Negative Negative   Mucus, UA Rare A  --   --   --   --    Hyaline Casts, UA  --    < > 2 A 1 2 A    < > = values in this interval not displayed.       Recent Labs   Lab 10/03/23  2105 10/04/23  0438 10/15/23  0353 02/24/24  1855   POC PH 7.290 LL 7.305 L 7.239 LL  --    POC PCO2 45.2 H 43.6 37.8  --    POC HCO3 21.7 L 21.7 L 16.1 L  " --    POC PO2 110 H 92 37 LL  --    POC SATURATED O2 98 96 60  --    POC BE -5 -5 -11  --    Sample ARTERIAL ARTERIAL VENOUS PIETER       Microbiology Results (last 7 days)       ** No results found for the last 168 hours. **            Review of patient's allergies indicates:   Allergen Reactions    Droperidol Hives    Penicillins Hives       Outpatient meds:  No current facility-administered medications on file prior to encounter.     Current Outpatient Medications on File Prior to Encounter   Medication Sig Dispense Refill    acetaZOLAMIDE (DIAMOX) 250 MG tablet take 1 tablet by mouth 3 times a day 90 tablet 3    amLODIPine (NORVASC) 5 MG tablet Take 1 tablet (5 mg total) by mouth once daily. 30 tablet 11    amLODIPine (NORVASC) 5 MG tablet take 2 tablets Oral Daily 60 tablet 0    apixaban (ELIQUIS) 5 mg Tab Take 1 tablet (5 mg total) by mouth 2 (two) times daily. Patient w/ thrombocytopenia <50, so held during admission, follow-up with hematologist about restarting 180 tablet 1    atropine 1% (ISOPTO ATROPINE) 1 % Drop Place 1 drop into the left eye 2 (two) times a day. 15 mL 3    blood sugar diagnostic (TRUE METRIX GLUCOSE TEST STRIP) Strp Use 1 strip to check blood glucose three times daily 100 each 11    blood-glucose meter (TRUE METRIX GLUCOSE METER) Misc Use to check blood glucose 1 each 0    blood-glucose meter,continuous (DEXCOM ) Misc USE WITH SENSORS 1 each 0    blood-glucose sensor Heather CHANGE EVERY 10 DAYS 3 each 0    brimonidine 0.15 % OPTH DROP (ALPHAGAN) 0.15 % ophthalmic solution Place 1 drop into both eyes 3 (three) times daily. 15 mL 3    brinzolamide (AZOPT) 1 % ophthalmic suspension Place1 drop in left eye 3 times a day for 30 days 15 mL 6    busPIRone (BUSPAR) 10 MG tablet Take 1 tablet (10 mg total) by mouth 3 (three) times daily as needed (anxiety). 60 tablet 5    carvediloL (COREG) 25 MG tablet take 1 tablet Oral 2 times daily 60 tablet 0    cetirizine (ZYRTEC) 10 MG tablet Take 1  tablet every day by oral route. 30 tablet 0    dorzolamide-timolol 2-0.5% (COSOPT) 22.3-6.8 mg/mL ophthalmic solution Place 1 drop into the left eye every 12 (twelve) hours. 10 mL 6    dorzolamide-timolol 2-0.5% (COSOPT) 22.3-6.8 mg/mL ophthalmic solution place 1 drop in left eye twice a day  for 30 days 10 mL 0    FLUoxetine 20 MG capsule Take 1 capsule every day by oral route for 90 days. 90 capsule 1    FLUoxetine 20 MG capsule take 2 capsules Oral Daily 60 capsule 0    FLUoxetine 20 MG capsule Take 1 capsule (20 mg total) by mouth once daily, for Mood.. 90 capsule 2    fluticasone propionate (FLONASE ALLERGY RELIEF) 50 mcg/actuation nasal spray Rarden 1 spray every day by intranasal route. 16 g 2    gabapentin (NEURONTIN) 300 MG capsule Take 2 capsules twice a day by oral route for 90 days. 360 capsule 1    hydrALAZINE (APRESOLINE) 100 MG tablet Take 1 tablet (100 mg total) by mouth 2 (two) times daily. 180 tablet 1    hydrALAZINE (APRESOLINE) 25 MG tablet take 1 tablet by mouth every 8 hours 90 tablet 0    insulin detemir U-100, Levemir, (LEVEMIR FLEXTOUCH U100 INSULIN) 100 unit/mL (3 mL) InPn pen Inject 22 Units into the skin every evening. Patient not needing insulin in-patient. Follow-up with PCP for hospital follow-up and restarting the medication. Or restart medication if glucose >200 consistently resume home insulin regimen. Or if glucose is persistently less than 100, decrease by 5 units until following up with PCP 15 mL 1    insulin glargine U-100, Lantus, 100 unit/mL injection inject 13 unit subcutaneously 2 times daily 10 mL 0    isosorbide mononitrate (IMDUR) 30 MG 24 hr tablet Take 1 tablet (30 mg total) by mouth once daily. 90 tablet 1    lancets (TRUEPLUS LANCETS) 33 gauge Misc Use 1 to check blood glucose three times daily 100 each 11    lancets 33 gauge Misc Use to test twice daily 100 each 5    levETIRAcetam (KEPPRA) 500 MG Tab Take 1 tablet (500 mg total) by mouth 2 (two) times daily. 60 tablet  "11    ondansetron (ZOFRAN-ODT) 4 MG TbDL Place 1 tablet (4 mg) on or under the tongue every 8 hours for 10 days. 24 tablet 2    oxyCODONE-acetaminophen (PERCOCET)  mg per tablet Take 1 tablet by mouth every 4 (four) hours as needed for Pain. 42 tablet 0    pen needle, diabetic 31 gauge x 3/16" Ndle Use as directed to inject insulin 5 times daily 100 each 11    pen needle, diabetic 31 gauge x 3/16" Ndle Use as directed with insulin once daily 100 each 0    prednisoLONE acetate (PRED FORTE) 1 % DrpS Place 1 drop into the left eye 2 (two) times daily. 5 mL 3    prednisoLONE acetate (PRED FORTE) 1 % DrpS Place 1 drop in left eye 4 times daily for 5 days 5 mL 0    predniSONE (DELTASONE) 20 MG tablet take 3 tablets Oral Daily,x30 day(s) 90 tablet 0    sevelamer carbonate (RENVELA) 800 mg Tab Take 1 tablet (800 mg total) by mouth 3 (three) times daily with meals. 180 tablet 11    sucralfate (CARAFATE) 100 mg/mL suspension Take 10 mL 4 times a day by oral route before meals for 30 days. 1200 mL 1    timolol maleate 0.5% (TIMOPTIC) 0.5 % Drop Place 1 drop in left eye 2 times a day for 30 days 5 mL 6    tobramycin-dexAMETHasone 0.3-0.1% (TOBRADEX) 0.3-0.1 % DrpS 1 drop Eye-Left every 6 hours for 5 day(s) 5 mL 0    [DISCONTINUED] atenoloL (TENORMIN) 50 MG tablet Take 1 tablet (50 mg total) by mouth every other day. 45 tablet 3    [DISCONTINUED] omeprazole (PRILOSEC) 20 MG capsule Take 2 capsules (40 mg total) by mouth once daily. for 10 days 20 capsule 0       Scheduled meds:      Infusions:      PRN meds:      Past Medical History:   Diagnosis Date    ESRD on hemodialysis 04/12/2022    Gastritis 12/2022    EGD was 12/5/22    Gastroparesis 04/12/2022    has not had Emptying study    Heart failure with preserved ejection fraction 04/12/2022    EF 55% on 3/22    History of supraventricular tachycardia     Hyperkalemia 07/07/2022    Hypertensive emergency 07/08/2022    Sickle cell trait 04/12/2022    Type 2 diabetes " mellitus      Past Surgical History:   Procedure Laterality Date     SECTION      x 3    COLONOSCOPY      COLONOSCOPY N/A 2022    Procedure: COLONOSCOPY;  Surgeon: Jagdeep Cedeno MD;  Location: [facility];  Service: Endoscopy;  Laterality: N/A;    DESTRUCTION, CILIARY BODY, USING LASER Left 3/8/2024    Procedure: Cyclophotocoagulation G-probe, retrobulbar chlorpromazine left eye;  Surgeon: Fidel Wise MD;  Location: [address];  Service: Ophthalmology;  Laterality: Left;    ENDOSCOPIC ULTRASOUND OF UPPER GASTROINTESTINAL TRACT N/A 2023    Procedure: ULTRASOUND, UPPER GI TRACT, ENDOSCOPIC;  Surgeon: Micky Paredes III, MD;  Location: [facility];  Service: Endoscopy;  Laterality: N/A;    ESOPHAGOGASTRODUODENOSCOPY N/A 10/18/2019    Procedure: ESOPHAGOGASTRODUODENOSCOPY (EGD);  Surgeon: Gianluca Mendez MD;  Location: [facility];  Service: Endoscopy;  Laterality: N/A;    ESOPHAGOGASTRODUODENOSCOPY N/A 2022    Procedure: EGD (ESOPHAGOGASTRODUODENOSCOPY);  Surgeon: Micky Paredes III, MD;  Location: [facility];  Service: Endoscopy;  Laterality: N/A;    ESOPHAGOGASTRODUODENOSCOPY N/A 2022    Procedure: EGD (ESOPHAGOGASTRODUODENOSCOPY);  Surgeon: Marcelo Zhong MD;  Location: [facility];  Service: Endoscopy;  Laterality: N/A;    INSERTION, CATHETER, TUNNELED N/A 2023    Procedure: Insertion,catheter,tunneled;  Surgeon: Carlos Thurman Jr., MD;  Location: [facility] OR;  Service: General;  Laterality: N/A;    LAPAROSCOPIC CHOLECYSTECTOMY N/A 2022    Procedure: CHOLECYSTECTOMY, LAPAROSCOPIC;  Surgeon: Grey Perez MD;  Location: [facility] OR;  Service: General;  Laterality: N/A;    PLACEMENT OF DUAL-LUMEN VASCULAR CATHETER Left 2022    Procedure: INSERTION-CATHETER-JOSEPH;  Surgeon: Dionte Gan MD;  Location: [facility] OR;  Service: General;  Laterality: Left;    PLACEMENT OF DUAL-LUMEN VASCULAR CATHETER Right 2022    Procedure:  INSERTION-CATHETER-Hemosplit;  Surgeon: Dionte Gan MD;  [facility];  Service: General;  Laterality: Right;     Family History   Problem Relation Name Age of Onset    Diabetes Mother      Diabetes Father       Social History     Tobacco Use    Smoking status: Never    Smokeless tobacco: Never   Substance Use Topics    Alcohol use: Not Currently    Drug use: No       OBJECTIVE:     Vital Signs and IO:  Temp:  [98.8 °F (37.1 °C)]   Pulse:  [100]   Resp:  [20]   BP: (185-241)/(105-126)   SpO2:  [96 %]   No intake/output data recorded.  Wt Readings from Last 5 Encounters:   06/22/24 52.6 kg (116 lb)   05/13/24 57 kg (125 lb 10.6 oz)   03/30/24 53.1 kg (117 lb)   03/08/24 53.1 kg (117 lb)   03/06/24 52.6 kg (116 lb)     Body mass index is 21.22 kg/m².    Physical Exam  Constitutional:       General: Patient is not in acute distress.     Appearance: Patient is well-developed. She is not diaphoretic.   HENT:      Head: Normocephalic and atraumatic.      Mouth/Throat: Mucous membranes are moist.   Eyes:      General: No scleral icterus.     Pupils: Pupils are equal, round, and reactive to light.   Cardiovascular:      Rate and Rhythm: Normal rate and regular rhythm.   Pulmonary:      Effort: Pulmonary effort is normal. No respiratory distress.      Breath sounds: No stridor.   Abdominal:      General: There is no distension.      Palpations: Abdomen is soft.   Musculoskeletal:         General: No deformity. Normal range of motion.      Cervical back: Neck supple.   Skin:     General: Skin is warm and dry.      Findings: No rash present. No erythema.   Neurological:      Mental Status: Patient is alert and oriented to person, place, and time.      Cranial Nerves: No cranial nerve deficit.   Psychiatric:         Behavior: Behavior normal.          Patient care time was spent personally by me on the following activities:     Obtaining a history.  Examination of patient.  Providing medical care at the patients  bedside.  Developing a treatment plan with patient or surrogate and bedside caregivers.  Ordering and reviewing laboratory studies, radiographic studies, pulse oximetry.  Ordering and performing treatments and interventions.  Evaluation of patient's response to treatment.  Discussions with consultants while on the unit and immediately available to the patient.  Re-evaluation of the patient's condition.  Documentation in the medical record.     Mando Benitez MD    [facility]  [address] [place] [id number]    [phone] - tel  [phone] - fax    6/22/2024

## 2024-06-22 NOTE — ASSESSMENT & PLAN NOTE
Not on any medications  Ordered HbA1C  Controlled     Latest Reference Range & Units 03/03/23 05:47 08/01/23 08:13   Hemoglobin A1C External 4.5 - 6.2 % 5.8 5.8

## 2024-06-22 NOTE — H&P
[facility] - Emergency Dept  Hospital Medicine  History & Physical    Patient Name: Tabby Howard  MRN: [id number]  Patient Class: OP- Observation  Admission Date: 2024  Attending Physician: Alfonso Garsia MD   Primary Care Provider: Melony Kim NP         Patient information was obtained from patient, past medical records, and ER records.     Subjective:     Principal Problem:Dialysis patient, noncompliant    Chief Complaint:   Chief Complaint   Patient presents with    Abdominal Pain     Flank pain and back pain since yesterday . Did not do diaylsis today        HPI: 34 year old pt getting admitted with abdominal pain and missed HD session  Pt was supposed to have HD  Missed Dialysis , became SOB and came to ER  Pts only c/o was abdominal pain all over and was asking iv pain meds   Denies any other issues    Past Medical History:   Diagnosis Date    ESRD on hemodialysis 2022    Gastritis 2022    EGD was 22    Gastroparesis 2022    has not had Emptying study    Heart failure with preserved ejection fraction 2022    EF 55% on 3/22    History of supraventricular tachycardia     Hyperkalemia 2022    Hypertensive emergency 2022    Sickle cell trait 2022    Type 2 diabetes mellitus        Past Surgical History:   Procedure Laterality Date     SECTION      x 3    COLONOSCOPY      COLONOSCOPY N/A 2022    Procedure: COLONOSCOPY;  Surgeon: Jagdeep Cedeno MD;  Location: [facility];  Service: Endoscopy;  Laterality: N/A;    DESTRUCTION, CILIARY BODY, USING LASER Left 3/8/2024    Procedure: Cyclophotocoagulation G-probe, retrobulbar chlorpromazine left eye;  Surgeon: Fidel Wise MD;  Location: [address];  Service: Ophthalmology;  Laterality: Left;    ENDOSCOPIC ULTRASOUND OF UPPER GASTROINTESTINAL TRACT N/A 2023    Procedure: ULTRASOUND, UPPER GI TRACT, ENDOSCOPIC;  Surgeon: Micky Paredes III, MD;  Location: [facility];   Service: Endoscopy;  Laterality: N/A;    ESOPHAGOGASTRODUODENOSCOPY N/A 10/18/2019    Procedure: ESOPHAGOGASTRODUODENOSCOPY (EGD);  Surgeon: Gianluca Mendez MD;  Location: [facility];  Service: Endoscopy;  Laterality: N/A;    ESOPHAGOGASTRODUODENOSCOPY N/A 08/24/2022    Procedure: EGD (ESOPHAGOGASTRODUODENOSCOPY);  Surgeon: Micky Paredes III, MD;  Location: [facility] ENDO;  Service: Endoscopy;  Laterality: N/A;    ESOPHAGOGASTRODUODENOSCOPY N/A 12/5/2022    Procedure: EGD (ESOPHAGOGASTRODUODENOSCOPY);  Surgeon: Marcelo Zhong MD;  Location: [facility];  Service: Endoscopy;  Laterality: N/A;    INSERTION, CATHETER, TUNNELED N/A 6/17/2023    Procedure: Insertion,catheter,tunneled;  Surgeon: Carlos Thurman Jr., MD;  Location: [facility] OR;  Service: General;  Laterality: N/A;    LAPAROSCOPIC CHOLECYSTECTOMY N/A 07/30/2022    Procedure: CHOLECYSTECTOMY, LAPAROSCOPIC;  Surgeon: Grey Perez MD;  Location: [facility] OR;  Service: General;  Laterality: N/A;    PLACEMENT OF DUAL-LUMEN VASCULAR CATHETER Left 07/12/2022    Procedure: INSERTION-CATHETER-JOSEPH;  Surgeon: Dionte Gan MD;  Location: [facility];  Service: General;  Laterality: Left;    PLACEMENT OF DUAL-LUMEN VASCULAR CATHETER Right 07/26/2022    Procedure: INSERTION-CATHETER-Hemosplit;  Surgeon: Dionte Gan MD;  Location: [facility] OR;  Service: General;  Laterality: Right;       Review of patient's allergies indicates:   Allergen Reactions    Droperidol Hives    Penicillins Hives       No current facility-administered medications on file prior to encounter.     Current Outpatient Medications on File Prior to Encounter   Medication Sig    acetaZOLAMIDE (DIAMOX) 250 MG tablet take 1 tablet by mouth 3 times a day (Patient taking differently: Take 250 mg by mouth 3 (three) times daily.)    amLODIPine (NORVASC) 5 MG tablet Take 1 tablet (5 mg total) by mouth once daily.    apixaban (ELIQUIS) 5 mg Tab Take 1 tablet (5 mg total) by mouth 2 (two) times daily.  Patient w/ thrombocytopenia <50, so held during admission, follow-up with hematologist about restarting    atropine 1% (ISOPTO ATROPINE) 1 % Drop Place 1 drop into the left eye 2 (two) times a day.    blood sugar diagnostic (TRUE METRIX GLUCOSE TEST STRIP) Strp Use 1 strip to check blood glucose three times daily    blood-glucose meter (TRUE METRIX GLUCOSE METER) Misc Use to check blood glucose    blood-glucose meter,continuous (DEXCOM ) Misc USE WITH SENSORS    blood-glucose sensor Heather CHANGE EVERY 10 DAYS    brimonidine 0.15 % OPTH DROP (ALPHAGAN) 0.15 % ophthalmic solution Place 1 drop into both eyes 3 (three) times daily.    brinzolamide (AZOPT) 1 % ophthalmic suspension Place1 drop in left eye 3 times a day for 30 days (Patient taking differently: Place 1 drop into the left eye 3 (three) times daily.)    busPIRone (BUSPAR) 10 MG tablet Take 1 tablet (10 mg total) by mouth 3 (three) times daily as needed (anxiety).    carvediloL (COREG) 25 MG tablet take 1 tablet Oral 2 times daily (Patient taking differently: Take 25 mg by mouth 2 (two) times daily.)    cetirizine (ZYRTEC) 10 MG tablet Take 1 tablet every day by oral route. (Patient taking differently: Take 10 mg by mouth once daily.)    dorzolamide-timolol 2-0.5% (COSOPT) 22.3-6.8 mg/mL ophthalmic solution Place 1 drop into the left eye every 12 (twelve) hours.    dorzolamide-timolol 2-0.5% (COSOPT) 22.3-6.8 mg/mL ophthalmic solution place 1 drop in left eye twice a day  for 30 days    FLUoxetine 20 MG capsule Take 1 capsule every day by oral route for 90 days.    FLUoxetine 20 MG capsule Take 1 capsule (20 mg total) by mouth once daily, for Mood..    fluticasone propionate (FLONASE ALLERGY RELIEF) 50 mcg/actuation nasal spray [place] 1 spray every day by intranasal route. (Patient taking differently: 1 spray by Each Nostril route Daily.)    gabapentin (NEURONTIN) 300 MG capsule Take 2 capsules twice a day by oral route for 90 days. (Patient taking  "differently: Take 2 capsules by mouth 2 (two) times daily.)    hydrALAZINE (APRESOLINE) 100 MG tablet Take 1 tablet (100 mg total) by mouth 2 (two) times daily.    hydrALAZINE (APRESOLINE) 25 MG tablet take 1 tablet by mouth every 8 hours (Patient taking differently: Take 25 mg by mouth every 8 (eight) hours.)    insulin detemir U-100, Levemir, (LEVEMIR FLEXTOUCH U100 INSULIN) 100 unit/mL (3 mL) InPn pen Inject 22 Units into the skin every evening. Patient not needing insulin in-patient. Follow-up with PCP for hospital follow-up and restarting the medication. Or restart medication if glucose >200 consistently resume home insulin regimen. Or if glucose is persistently less than 100, decrease by 5 units until following up with PCP    insulin glargine U-100, Lantus, 100 unit/mL injection inject 13 unit subcutaneously 2 times daily (Patient taking differently: Inject 13 Units into the skin 2 (two) times a day.)    isosorbide mononitrate (IMDUR) 30 MG 24 hr tablet Take 1 tablet (30 mg total) by mouth once daily.    lancets (TRUEPLUS LANCETS) 33 gauge Misc Use 1 to check blood glucose three times daily    lancets 33 gauge Misc Use to test twice daily    levETIRAcetam (KEPPRA) 500 MG Tab Take 1 tablet (500 mg total) by mouth 2 (two) times daily.    ondansetron (ZOFRAN-ODT) 4 MG TbDL Place 1 tablet (4 mg) on or under the tongue every 8 hours for 10 days. (Patient taking differently: Take 4 mg by mouth every 8 (eight) hours as needed (Nausea).)    oxyCODONE-acetaminophen (PERCOCET)  mg per tablet Take 1 tablet by mouth every 4 (four) hours as needed for Pain.    pen needle, diabetic 31 gauge x 3/16" Ndle Use as directed to inject insulin 5 times daily    pen needle, diabetic 31 gauge x 3/16" Ndle Use as directed with insulin once daily    prednisoLONE acetate (PRED FORTE) 1 % DrpS Place 1 drop into the left eye 2 (two) times daily.    prednisoLONE acetate (PRED FORTE) 1 % DrpS Place 1 drop in left eye 4 times daily for " 5 days (Patient taking differently: Place 1 drop into the left eye 4 (four) times daily.)    predniSONE (DELTASONE) 20 MG tablet take 3 tablets Oral Daily,x30 day(s) (Patient taking differently: Take 3 tablets by mouth once daily.)    sevelamer carbonate (RENVELA) 800 mg Tab Take 1 tablet (800 mg total) by mouth 3 (three) times daily with meals.    sucralfate (CARAFATE) 100 mg/mL suspension Take 10 mL 4 times a day by oral route before meals for 30 days. (Patient taking differently: Take 10 mLs by mouth 4 (four) times daily with meals and nightly.)    timolol maleate 0.5% (TIMOPTIC) 0.5 % Drop Place 1 drop in left eye 2 times a day for 30 days (Patient taking differently: Place 1 drop into the left eye 2 (two) times daily.)    tobramycin-dexAMETHasone 0.3-0.1% (TOBRADEX) 0.3-0.1 % DrpS 1 drop Eye-Left every 6 hours for 5 day(s) (Patient taking differently: Place 1 drop into the left eye every 6 (six) hours.)    [DISCONTINUED] amLODIPine (NORVASC) 5 MG tablet take 2 tablets Oral Daily    [DISCONTINUED] atenoloL (TENORMIN) 50 MG tablet Take 1 tablet (50 mg total) by mouth every other day.    [DISCONTINUED] FLUoxetine 20 MG capsule take 2 capsules Oral Daily    [DISCONTINUED] omeprazole (PRILOSEC) 20 MG capsule Take 2 capsules (40 mg total) by mouth once daily. for 10 days     Family History       Problem Relation (Age of Onset)    Diabetes Mother, Father          Tobacco Use    Smoking status: Never    Smokeless tobacco: Never   Substance and Sexual Activity    Alcohol use: Not Currently    Drug use: No    Sexual activity: Not Currently     Partners: Male     Birth control/protection: I.U.D.     Review of Systems   Constitutional:  Negative for activity change and appetite change.   HENT:  Negative for congestion and dental problem.    Eyes:  Negative for discharge and itching.   Respiratory:  Negative for shortness of breath.    Cardiovascular:  Negative for chest pain.   Gastrointestinal:  Positive for abdominal  pain. Negative for abdominal distention.   Endocrine: Negative for cold intolerance.   Genitourinary:  Negative for difficulty urinating and dysuria.   Musculoskeletal:  Negative for arthralgias and back pain.   Skin:  Negative for color change.   Neurological:  Negative for dizziness and facial asymmetry.   Hematological:  Negative for adenopathy.   Psychiatric/Behavioral:  Negative for agitation and behavioral problems.      Objective:     Vital Signs (Most Recent):  Temp: 98 °F (36.7 °C) (06/22/24 1820)  Pulse: 81 (06/22/24 1820)  Resp: 18 (06/22/24 1820)  BP: (!) 216/108 (06/22/24 1820)  SpO2: 100 % (06/22/24 1820) Vital Signs (24h Range):  Temp:  [98 °F (36.7 °C)-98.8 °F (37.1 °C)] 98 °F (36.7 °C)  Pulse:  [] 81  Resp:  [13-22] 18  SpO2:  [95 %-100 %] 100 %  BP: (117-241)/() 216/108     Weight: 52.6 kg (116 lb)  Body mass index is 21.22 kg/m².     Physical Exam  Vitals and nursing note reviewed.   Constitutional:       General: She is not in acute distress.  HENT:      Head: Atraumatic.      Right Ear: External ear normal.      Left Ear: External ear normal.      Nose: Nose normal.      Mouth/Throat:      Mouth: Mucous membranes are moist.   Eyes:      Extraocular Movements: Extraocular movements intact.   Cardiovascular:      Rate and Rhythm: Normal rate.   Pulmonary:      Effort: Pulmonary effort is normal.   Abdominal:      Palpations: Abdomen is soft.   Musculoskeletal:         General: Normal range of motion.      Cervical back: Normal range of motion.   Skin:     General: Skin is warm.   Neurological:      Mental Status: She is alert and oriented to person, place, and time.   Psychiatric:         Behavior: Behavior normal.                Significant Labs: All pertinent labs within the past 24 hours have been reviewed.  CBC:   Recent Labs   Lab 06/22/24  1145   WBC 11.11   HGB 7.1*   HCT 21.8*   *     CMP:   Recent Labs   Lab 06/22/24  1145      K 4.6   CL 99   CO2 24   *    BUN 45*   CREATININE 5.9*   CALCIUM 8.7   ALBUMIN 4.1   ANIONGAP 22*       Significant Imaging: I have reviewed all pertinent imaging results/findings within the past 24 hours.    Assessment/Plan:     * Dialysis patient, noncompliant  Will have HD today  Nephro MD consulted       Frequent hospital admissions  This year so far and last year   She had numerous  IP admissions in this hospital alone  19 CT abdomen/pelvis  CTA chest x 5  Numerous CXR/KUB/EGD /Colonoscopy in various hospitals  Pt shops hospitals in [place] very frequently     She will most likely develop radiation associated complications in near future      History of diabetes mellitus, type II  Not on any medications  Ordered HbA1C  Controlled     Latest Reference Range & Units 03/03/23 05:47 08/01/23 08:13   Hemoglobin A1C External 4.5 - 6.2 % 5.8 5.8           Abdominal pain   Mostly narcotic bowel syndrome in the background of pain med seeking behavior  She is diabetic but her HbA1C levels has been normal whenever checked , so unlikely for gastroparesis      Anemia  AOCD  Transfuse as needed if indicated ,along with HD   Current CBC reviewed-   Lab Results   Component Value Date    HGB 7.1 (L) 06/22/2024    HCT 21.8 (L) 06/22/2024         Seizure  Maintain Keppra      Drug-seeking behavior  Aware       Thrombocytopenia  Aware   Recent Labs   Lab 06/22/24  1145   *         Deep vein thrombosis (DVT) of non-extremity vein  H/o DVT in 2022?  And still on NOAC   Will do USS veins Les today  Hold NOAC at the moment  Pt also thrombocytopenic      ESRD (end stage renal disease)  Creatine stable for now. BMP reviewed- noted Estimated Creatinine Clearance: 10.5 mL/min (A) (based on SCr of 5.9 mg/dL (H)). according to latest data. Based on current GFR, CKD stage is end stage.  Monitor UOP and serial BMP and adjust therapy as needed. Renally dose meds. Avoid nephrotoxic medications and procedures.      VTE Risk Mitigation (From admission, onward)            Ordered     heparin (porcine) injection 5,000 Units  Every 8 hours         06/22/24 1248     IP VTE HIGH RISK PATIENT  Once         06/22/24 1248     Place sequential compression device  Until discontinued         06/22/24 1248                       On 06/22/2024, patient should be placed in hospital observation services under my care.        Verified patient consent prior to treatment  HD treatment administered per policies and procedures  Net UF removed 3.5 L, Patient tolerated well, BP elevated. Reported to primary nurse  Report given to SHAILA Venegas        06/22/24 1820   Required for all Hemodialysis Patients   Hepatitis Status negative   Handoff Report   Received From SHAILA Sams   Given To SHAILA Venegas   Treatment Type   Treatment Type Maintenance   Vital Signs   Temp 98 °F (36.7 °C)   Temp Source Oral   Pulse 81   Heart Rate Source Monitor   Resp 18   SpO2 100 %   Device (Oxygen Therapy) nasal cannula   BP (!) 216/108   BP Location Right arm   BP Method Automatic   Patient Position Lying   Assessments (Pre/Post)   Consent Obtained yes   Safety vein preservation armband present   Date Hepatitis Profile Obtained 02/27/24   Transport Modality bed   Level of Consciousness (AVPU) alert   Pain   Preferred Pain Scale number (Numeric Rating Pain Scale)   Comfort/Acceptable Pain Level 0   Pain Rating (0-10): Rest 0   Pain Rating (0-10): Activity 0   Pre-Hemodialysis Assessment   Patient Status Departed   Post-Hemodialysis Assessment   Rinseback Volume (mL) 250 mL   Blood Volume Processed (Liters) 63 L   Dialyzer Clearance Lightly streaked   Duration of Treatment 180 minutes   Total UF (mL) 4000 mL   Net Fluid Removal 3500   Patient Response to Treatment Tolerated treatment well   Post-Treatment Weight 49.1 kg (108 lb 3.9 oz)   Treatment Weight Change -3.5   Post-Hemodialysis Comments HD treatment administered per policies and procedures.         Alfonso Garsia MD  Department of Hospital Medicine  [facility]  - Emergency Dept

## 2024-06-22 NOTE — SUBJECTIVE & OBJECTIVE
Past Medical History:   Diagnosis Date    ESRD on hemodialysis 2022    Gastritis 2022    EGD was 22    Gastroparesis 2022    has not had Emptying study    Heart failure with preserved ejection fraction 2022    EF 55% on 3/22    History of supraventricular tachycardia     Hyperkalemia 2022    Hypertensive emergency 2022    Sickle cell trait 2022    Type 2 diabetes mellitus        Past Surgical History:   Procedure Laterality Date     SECTION      x 3    COLONOSCOPY      COLONOSCOPY N/A 2022    Procedure: COLONOSCOPY;  Surgeon: Jagdeep Cedeno MD;  Location: [facility];  Service: Endoscopy;  Laterality: N/A;    DESTRUCTION, CILIARY BODY, USING LASER Left 3/8/2024    Procedure: Cyclophotocoagulation G-probe, retrobulbar chlorpromazine left eye;  Surgeon: Fidel Wise MD;  Location: [address];  Service: Ophthalmology;  Laterality: Left;    ENDOSCOPIC ULTRASOUND OF UPPER GASTROINTESTINAL TRACT N/A 2023    Procedure: ULTRASOUND, UPPER GI TRACT, ENDOSCOPIC;  Surgeon: Micky Paredes III, MD;  Location: [facility];  Service: Endoscopy;  Laterality: N/A;    ESOPHAGOGASTRODUODENOSCOPY N/A 10/18/2019    Procedure: ESOPHAGOGASTRODUODENOSCOPY (EGD);  Surgeon: Gianluca Mendez MD;  Location: [facility];  Service: Endoscopy;  Laterality: N/A;    ESOPHAGOGASTRODUODENOSCOPY N/A 2022    Procedure: EGD (ESOPHAGOGASTRODUODENOSCOPY);  Surgeon: Micky Paredes III, MD;  Location: [facility];  Service: Endoscopy;  Laterality: N/A;    ESOPHAGOGASTRODUODENOSCOPY N/A 2022    Procedure: EGD (ESOPHAGOGASTRODUODENOSCOPY);  Surgeon: Marcelo Zhong MD;  Location: [facility];  Service: Endoscopy;  Laterality: N/A;    INSERTION, CATHETER, TUNNELED N/A 2023    Procedure: Insertion,catheter,tunneled;  Surgeon: Carlos Thurman Jr., MD;  Location: [facility];  Service: General;  Laterality: N/A;    LAPAROSCOPIC CHOLECYSTECTOMY N/A 2022    Procedure:  CHOLECYSTECTOMY, LAPAROSCOPIC;  Surgeon: Grey Perez MD;  Location: [facility] OR;  Service: General;  Laterality: N/A;    PLACEMENT OF DUAL-LUMEN VASCULAR CATHETER Left 07/12/2022    Procedure: INSERTION-CATHETER-JOSEPH;  Surgeon: Dionte Gan MD;  Location: [facility] OR;  Service: General;  Laterality: Left;    PLACEMENT OF DUAL-LUMEN VASCULAR CATHETER Right 07/26/2022    Procedure: INSERTION-CATHETER-Hemosplit;  Surgeon: Dionte Gan MD;  Location: [facility] OR;  Service: General;  Laterality: Right;       Review of patient's allergies indicates:   Allergen Reactions    Droperidol Hives    Penicillins Hives       No current facility-administered medications on file prior to encounter.     Current Outpatient Medications on File Prior to Encounter   Medication Sig    acetaZOLAMIDE (DIAMOX) 250 MG tablet take 1 tablet by mouth 3 times a day (Patient taking differently: Take 250 mg by mouth 3 (three) times daily.)    amLODIPine (NORVASC) 5 MG tablet Take 1 tablet (5 mg total) by mouth once daily.    apixaban (ELIQUIS) 5 mg Tab Take 1 tablet (5 mg total) by mouth 2 (two) times daily. Patient w/ thrombocytopenia <50, so held during admission, follow-up with hematologist about restarting    atropine 1% (ISOPTO ATROPINE) 1 % Drop Place 1 drop into the left eye 2 (two) times a day.    blood sugar diagnostic (TRUE METRIX GLUCOSE TEST STRIP) Strp Use 1 strip to check blood glucose three times daily    blood-glucose meter (TRUE METRIX GLUCOSE METER) Misc Use to check blood glucose    blood-glucose meter,continuous (DEXCOM ) Misc USE WITH SENSORS    blood-glucose sensor Heather CHANGE EVERY 10 DAYS    brimonidine 0.15 % OPTH DROP (ALPHAGAN) 0.15 % ophthalmic solution Place 1 drop into both eyes 3 (three) times daily.    brinzolamide (AZOPT) 1 % ophthalmic suspension Place1 drop in left eye 3 times a day for 30 days (Patient taking differently: Place 1 drop into the left eye 3 (three) times daily.)    busPIRone (BUSPAR) 10 MG  tablet Take 1 tablet (10 mg total) by mouth 3 (three) times daily as needed (anxiety).    carvediloL (COREG) 25 MG tablet take 1 tablet Oral 2 times daily (Patient taking differently: Take 25 mg by mouth 2 (two) times daily.)    cetirizine (ZYRTEC) 10 MG tablet Take 1 tablet every day by oral route. (Patient taking differently: Take 10 mg by mouth once daily.)    dorzolamide-timolol 2-0.5% (COSOPT) 22.3-6.8 mg/mL ophthalmic solution Place 1 drop into the left eye every 12 (twelve) hours.    dorzolamide-timolol 2-0.5% (COSOPT) 22.3-6.8 mg/mL ophthalmic solution place 1 drop in left eye twice a day  for 30 days    FLUoxetine 20 MG capsule Take 1 capsule every day by oral route for 90 days.    FLUoxetine 20 MG capsule Take 1 capsule (20 mg total) by mouth once daily, for Mood..    fluticasone propionate (FLONASE ALLERGY RELIEF) 50 mcg/actuation nasal spray [place] 1 spray every day by intranasal route. (Patient taking differently: 1 spray by Each Nostril route Daily.)    gabapentin (NEURONTIN) 300 MG capsule Take 2 capsules twice a day by oral route for 90 days. (Patient taking differently: Take 2 capsules by mouth 2 (two) times daily.)    hydrALAZINE (APRESOLINE) 100 MG tablet Take 1 tablet (100 mg total) by mouth 2 (two) times daily.    hydrALAZINE (APRESOLINE) 25 MG tablet take 1 tablet by mouth every 8 hours (Patient taking differently: Take 25 mg by mouth every 8 (eight) hours.)    insulin detemir U-100, Levemir, (LEVEMIR FLEXTOUCH U100 INSULIN) 100 unit/mL (3 mL) InPn pen Inject 22 Units into the skin every evening. Patient not needing insulin in-patient. Follow-up with PCP for hospital follow-up and restarting the medication. Or restart medication if glucose >200 consistently resume home insulin regimen. Or if glucose is persistently less than 100, decrease by 5 units until following up with PCP    insulin glargine U-100, Lantus, 100 unit/mL injection inject 13 unit subcutaneously 2 times daily (Patient taking  "differently: Inject 13 Units into the skin 2 (two) times a day.)    isosorbide mononitrate (IMDUR) 30 MG 24 hr tablet Take 1 tablet (30 mg total) by mouth once daily.    lancets (TRUEPLUS LANCETS) 33 gauge Misc Use 1 to check blood glucose three times daily    lancets 33 gauge Misc Use to test twice daily    levETIRAcetam (KEPPRA) 500 MG Tab Take 1 tablet (500 mg total) by mouth 2 (two) times daily.    ondansetron (ZOFRAN-ODT) 4 MG TbDL Place 1 tablet (4 mg) on or under the tongue every 8 hours for 10 days. (Patient taking differently: Take 4 mg by mouth every 8 (eight) hours as needed (Nausea).)    oxyCODONE-acetaminophen (PERCOCET)  mg per tablet Take 1 tablet by mouth every 4 (four) hours as needed for Pain.    pen needle, diabetic 31 gauge x 3/16" Ndle Use as directed to inject insulin 5 times daily    pen needle, diabetic 31 gauge x 3/16" Ndle Use as directed with insulin once daily    prednisoLONE acetate (PRED FORTE) 1 % DrpS Place 1 drop into the left eye 2 (two) times daily.    prednisoLONE acetate (PRED FORTE) 1 % DrpS Place 1 drop in left eye 4 times daily for 5 days (Patient taking differently: Place 1 drop into the left eye 4 (four) times daily.)    predniSONE (DELTASONE) 20 MG tablet take 3 tablets Oral Daily,x30 day(s) (Patient taking differently: Take 3 tablets by mouth once daily.)    sevelamer carbonate (RENVELA) 800 mg Tab Take 1 tablet (800 mg total) by mouth 3 (three) times daily with meals.    sucralfate (CARAFATE) 100 mg/mL suspension Take 10 mL 4 times a day by oral route before meals for 30 days. (Patient taking differently: Take 10 mLs by mouth 4 (four) times daily with meals and nightly.)    timolol maleate 0.5% (TIMOPTIC) 0.5 % Drop Place 1 drop in left eye 2 times a day for 30 days (Patient taking differently: Place 1 drop into the left eye 2 (two) times daily.)    tobramycin-dexAMETHasone 0.3-0.1% (TOBRADEX) 0.3-0.1 % DrpS 1 drop Eye-Left every 6 hours for 5 day(s) (Patient taking " differently: Place 1 drop into the left eye every 6 (six) hours.)    [DISCONTINUED] amLODIPine (NORVASC) 5 MG tablet take 2 tablets Oral Daily    [DISCONTINUED] atenoloL (TENORMIN) 50 MG tablet Take 1 tablet (50 mg total) by mouth every other day.    [DISCONTINUED] FLUoxetine 20 MG capsule take 2 capsules Oral Daily    [DISCONTINUED] omeprazole (PRILOSEC) 20 MG capsule Take 2 capsules (40 mg total) by mouth once daily. for 10 days     Family History       Problem Relation (Age of Onset)    Diabetes Mother, Father          Tobacco Use    Smoking status: Never    Smokeless tobacco: Never   Substance and Sexual Activity    Alcohol use: Not Currently    Drug use: No    Sexual activity: Not Currently     Partners: Male     Birth control/protection: I.U.D.     Review of Systems   Constitutional:  Negative for activity change and appetite change.   HENT:  Negative for congestion and dental problem.    Eyes:  Negative for discharge and itching.   Respiratory:  Negative for shortness of breath.    Cardiovascular:  Negative for chest pain.   Gastrointestinal:  Positive for abdominal pain. Negative for abdominal distention.   Endocrine: Negative for cold intolerance.   Genitourinary:  Negative for difficulty urinating and dysuria.   Musculoskeletal:  Negative for arthralgias and back pain.   Skin:  Negative for color change.   Neurological:  Negative for dizziness and facial asymmetry.   Hematological:  Negative for adenopathy.   Psychiatric/Behavioral:  Negative for agitation and behavioral problems.      Objective:     Vital Signs (Most Recent):  Temp: 98 °F (36.7 °C) (06/22/24 1820)  Pulse: 81 (06/22/24 1820)  Resp: 18 (06/22/24 1820)  BP: (!) 216/108 (06/22/24 1820)  SpO2: 100 % (06/22/24 1820) Vital Signs (24h Range):  Temp:  [98 °F (36.7 °C)-98.8 °F (37.1 °C)] 98 °F (36.7 °C)  Pulse:  [] 81  Resp:  [13-22] 18  SpO2:  [95 %-100 %] 100 %  BP: (117-241)/() 216/108     Weight: 52.6 kg (116 lb)  Body mass index is  21.22 kg/m².     Physical Exam  Vitals and nursing note reviewed.   Constitutional:       General: She is not in acute distress.  HENT:      Head: Atraumatic.      Right Ear: External ear normal.      Left Ear: External ear normal.      Nose: Nose normal.      Mouth/Throat:      Mouth: Mucous membranes are moist.   Eyes:      Extraocular Movements: Extraocular movements intact.   Cardiovascular:      Rate and Rhythm: Normal rate.   Pulmonary:      Effort: Pulmonary effort is normal.   Abdominal:      Palpations: Abdomen is soft.   Musculoskeletal:         General: Normal range of motion.      Cervical back: Normal range of motion.   Skin:     General: Skin is warm.   Neurological:      Mental Status: She is alert and oriented to person, place, and time.   Psychiatric:         Behavior: Behavior normal.                Significant Labs: All pertinent labs within the past 24 hours have been reviewed.  CBC:   Recent Labs   Lab 06/22/24  1145   WBC 11.11   HGB 7.1*   HCT 21.8*   *     CMP:   Recent Labs   Lab 06/22/24  1145      K 4.6   CL 99   CO2 24   *   BUN 45*   CREATININE 5.9*   CALCIUM 8.7   ALBUMIN 4.1   ANIONGAP 22*       Significant Imaging: I have reviewed all pertinent imaging results/findings within the past 24 hours.

## 2024-06-22 NOTE — ASSESSMENT & PLAN NOTE
Creatine stable for now. BMP reviewed- noted Estimated Creatinine Clearance: 10.5 mL/min (A) (based on SCr of 5.9 mg/dL (H)). according to latest data. Based on current GFR, CKD stage is end stage.  Monitor UOP and serial BMP and adjust therapy as needed. Renally dose meds. Avoid nephrotoxic medications and procedures.

## 2024-06-22 NOTE — ASSESSMENT & PLAN NOTE
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

## 2024-06-22 NOTE — HPI
34 year old pt getting admitted with abdominal pain and missed HD session  Pt was supposed to have HD  Missed Dialysis , became SOB and came to ER  Pts only c/o was abdominal pain all over and was asking iv pain meds   Denies any other issues

## 2024-06-22 NOTE — ASSESSMENT & PLAN NOTE
AOCD  Transfuse as needed if indicated ,along with HD   Current CBC reviewed-   Lab Results   Component Value Date    HGB 7.1 (L) 06/22/2024    HCT 21.8 (L) 06/22/2024

## 2024-06-22 NOTE — ASSESSMENT & PLAN NOTE
H/o DVT in 2022?  And still on NOAC   Will do USS veins Les today  Hold NOAC at the moment  Pt also thrombocytopenic

## 2024-06-23 VITALS
DIASTOLIC BLOOD PRESSURE: 85 MMHG | HEART RATE: 90 BPM | OXYGEN SATURATION: 100 % | TEMPERATURE: 98 F | SYSTOLIC BLOOD PRESSURE: 169 MMHG | BODY MASS INDEX: 21.22 KG/M2 | RESPIRATION RATE: 18 BRPM | WEIGHT: 116 LBS

## 2024-06-23 LAB
ALBUMIN SERPL BCP-MCNC: 3.4 G/DL (ref 3.5–5.2)
ALP SERPL-CCNC: 119 U/L (ref 55–135)
ALT SERPL W/O P-5'-P-CCNC: 42 U/L (ref 10–44)
ANION GAP SERPL CALC-SCNC: 12 MMOL/L (ref 8–16)
AST SERPL-CCNC: 44 U/L (ref 10–40)
BASOPHILS # BLD AUTO: 0.02 K/UL (ref 0–0.2)
BASOPHILS NFR BLD: 0.3 % (ref 0–1.9)
BILIRUB SERPL-MCNC: 1.7 MG/DL (ref 0.1–1)
BUN SERPL-MCNC: 27 MG/DL (ref 6–20)
CALCIUM SERPL-MCNC: 8.6 MG/DL (ref 8.7–10.5)
CHLORIDE SERPL-SCNC: 102 MMOL/L (ref 95–110)
CO2 SERPL-SCNC: 25 MMOL/L (ref 23–29)
CREAT SERPL-MCNC: 4.1 MG/DL (ref 0.5–1.4)
DIFFERENTIAL METHOD BLD: ABNORMAL
EOSINOPHIL # BLD AUTO: 0.1 K/UL (ref 0–0.5)
EOSINOPHIL NFR BLD: 1.4 % (ref 0–8)
ERYTHROCYTE [DISTWIDTH] IN BLOOD BY AUTOMATED COUNT: 19.9 % (ref 11.5–14.5)
EST. GFR  (NO RACE VARIABLE): 13.9 ML/MIN/1.73 M^2
ESTIMATED AVG GLUCOSE: 197 MG/DL (ref 68–131)
GLUCOSE SERPL-MCNC: 166 MG/DL (ref 70–110)
HBA1C MFR BLD: 8.5 % (ref 4.5–6.2)
HCT VFR BLD AUTO: 24 % (ref 37–48.5)
HGB BLD-MCNC: 7.7 G/DL (ref 12–16)
IMM GRANULOCYTES # BLD AUTO: 0.05 K/UL (ref 0–0.04)
IMM GRANULOCYTES NFR BLD AUTO: 0.8 % (ref 0–0.5)
LYMPHOCYTES # BLD AUTO: 0.7 K/UL (ref 1–4.8)
LYMPHOCYTES NFR BLD: 11.1 % (ref 18–48)
MAGNESIUM SERPL-MCNC: 1.9 MG/DL (ref 1.6–2.6)
MCH RBC QN AUTO: 29.4 PG (ref 27–31)
MCHC RBC AUTO-ENTMCNC: 32.1 G/DL (ref 32–36)
MCV RBC AUTO: 92 FL (ref 82–98)
MONOCYTES # BLD AUTO: 0.5 K/UL (ref 0.3–1)
MONOCYTES NFR BLD: 8 % (ref 4–15)
NEUTROPHILS # BLD AUTO: 5 K/UL (ref 1.8–7.7)
NEUTROPHILS NFR BLD: 78.4 % (ref 38–73)
NRBC BLD-RTO: 1 /100 WBC
PLATELET # BLD AUTO: 125 K/UL (ref 150–450)
PMV BLD AUTO: 10.2 FL (ref 9.2–12.9)
POTASSIUM SERPL-SCNC: 4.2 MMOL/L (ref 3.5–5.1)
PROT SERPL-MCNC: 5.8 G/DL (ref 6–8.4)
RBC # BLD AUTO: 2.62 M/UL (ref 4–5.4)
SODIUM SERPL-SCNC: 139 MMOL/L (ref 136–145)
WBC # BLD AUTO: 6.41 K/UL (ref 3.9–12.7)

## 2024-06-23 PROCEDURE — 83735 ASSAY OF MAGNESIUM: CPT | Performed by: INTERNAL MEDICINE

## 2024-06-23 PROCEDURE — 63600175 PHARM REV CODE 636 W HCPCS: Performed by: INTERNAL MEDICINE

## 2024-06-23 PROCEDURE — 96376 TX/PRO/DX INJ SAME DRUG ADON: CPT

## 2024-06-23 PROCEDURE — 96372 THER/PROPH/DIAG INJ SC/IM: CPT | Mod: 59 | Performed by: INTERNAL MEDICINE

## 2024-06-23 PROCEDURE — G0378 HOSPITAL OBSERVATION PER HR: HCPCS

## 2024-06-23 PROCEDURE — 83036 HEMOGLOBIN GLYCOSYLATED A1C: CPT | Performed by: INTERNAL MEDICINE

## 2024-06-23 PROCEDURE — 96375 TX/PRO/DX INJ NEW DRUG ADDON: CPT

## 2024-06-23 PROCEDURE — 80053 COMPREHEN METABOLIC PANEL: CPT | Performed by: INTERNAL MEDICINE

## 2024-06-23 PROCEDURE — 85025 COMPLETE CBC W/AUTO DIFF WBC: CPT | Performed by: INTERNAL MEDICINE

## 2024-06-23 PROCEDURE — 25000003 PHARM REV CODE 250: Performed by: INTERNAL MEDICINE

## 2024-06-23 RX ORDER — PANTOPRAZOLE SODIUM 40 MG/1
40 TABLET, DELAYED RELEASE ORAL DAILY
Qty: 30 TABLET | Refills: 0 | Status: SHIPPED | OUTPATIENT
Start: 2024-06-24 | End: 2024-07-24

## 2024-06-23 RX ADMIN — ISOSORBIDE MONONITRATE 30 MG: 30 TABLET, EXTENDED RELEASE ORAL at 09:06

## 2024-06-23 RX ADMIN — ATROPINE SULFATE 1 DROP: 10 SOLUTION/ DROPS OPHTHALMIC at 09:06

## 2024-06-23 RX ADMIN — HYDROCODONE BITARTRATE AND ACETAMINOPHEN 1 TABLET: 5; 325 TABLET ORAL at 01:06

## 2024-06-23 RX ADMIN — LEVETIRACETAM 500 MG: 500 TABLET, FILM COATED ORAL at 09:06

## 2024-06-23 RX ADMIN — HYDROMORPHONE HYDROCHLORIDE 0.5 MG: 0.5 INJECTION, SOLUTION INTRAMUSCULAR; INTRAVENOUS; SUBCUTANEOUS at 07:06

## 2024-06-23 RX ADMIN — PANTOPRAZOLE SODIUM 40 MG: 40 TABLET, DELAYED RELEASE ORAL at 07:06

## 2024-06-23 RX ADMIN — ONDANSETRON 4 MG: 2 INJECTION INTRAMUSCULAR; INTRAVENOUS at 10:06

## 2024-06-23 RX ADMIN — CARVEDILOL 12.5 MG: 3.12 TABLET, FILM COATED ORAL at 09:06

## 2024-06-23 RX ADMIN — HEPARIN SODIUM 5000 UNITS: 5000 INJECTION, SOLUTION INTRAVENOUS; SUBCUTANEOUS at 07:06

## 2024-06-23 RX ADMIN — AMLODIPINE BESYLATE 5 MG: 5 TABLET ORAL at 09:06

## 2024-06-23 RX ADMIN — HYDRALAZINE HYDROCHLORIDE 100 MG: 25 TABLET ORAL at 09:06

## 2024-06-23 NOTE — DISCHARGE SUMMARY
[facility] - Emergency Dept  Hospital Medicine  Discharge Summary      Patient Name: Tabby Howard  MRN: [id number]  UNIQUE: [id number]  Patient Class: OP- Observation  Admission Date: 6/22/2024  Hospital Length of Stay: 0 days  Discharge Date and Time:  06/23/2024   Attending Physician: Mohini Yusuf MD   Discharging Provider: Mohini Yusuf MD  Primary Care Provider: Melony Kim NP    Primary Care Team: Networked reference to record PCT     HPI:   34 year old pt getting admitted with abdominal pain and missed HD session  Pt was supposed to have HD  Missed Dialysis , became SOB and came to ER  Pts only c/o was abdominal pain all over and was asking iv pain meds   Denies any other issues    * No surgery found *      Hospital Course:   Patient was admitted with missed dialysis, Sob and hypertensive urgency. Nephrology was consulted and patient was dialysed. Patient is noncompliant with medications. Home medications resumed. Per nephrology to resume routine dialysis.      Goals of Care Treatment Preferences:  Code Status: Full Code    Health care agent: Step-Mother Tanya Howard / Father Garcia Howard  Health care agent number: [phone] / [phone]               Physical Exam  Vitals and nursing note reviewed.   Constitutional:       General: She is not in acute distress.  HENT:      Head: Atraumatic.      Right Ear: External ear normal.      Left Ear: External ear normal.      Nose: Nose normal.      Mouth/Throat:      Mouth: Mucous membranes are moist.   Eyes:      Extraocular Movements: Extraocular movements intact.   Cardiovascular:      Rate and Rhythm: Normal rate.   Pulmonary:      Effort: Pulmonary effort is normal.   Abdominal:      Palpations: Abdomen is soft.   Musculoskeletal:         General: Normal range of motion.      Cervical back: Normal range of motion.   Skin:     General: Skin is warm.   Neurological:      Mental Status: She is alert and oriented to  person, place, and time.   Psychiatric:         Behavior: Behavior normal.    Consults:   Consults (From admission, onward)          Status Ordering Provider     Inpatient consult to Nephrology  Once        Provider:  Mando Benitez MD    Completed GENET GARAY            Neuro  Seizure  Maintain Keppra      Renal/  * Dialysis patient, noncompliant  S/p ED 6/22  Per nephrology, resume regular dialysis       ESRD (end stage renal disease)  Creatine stable for now. BMP reviewed- noted Estimated Creatinine Clearance: 15.1 mL/min (A) (based on SCr of 4.1 mg/dL (H)). according to latest data. Based on current GFR, CKD stage is end stage.  Monitor UOP and serial BMP and adjust therapy as needed. Renally dose meds. Avoid nephrotoxic medications and procedures.    Oncology  Anemia  Current CBC reviewed-   Lab Results   Component Value Date    HGB 7.7 (L) 06/23/2024    HCT 24.0 (L) 06/23/2024     Cont Epo with dialysis    Endocrine  History of diabetes mellitus, type II  Patient states she takes insulin at home           GI  Abdominal pain  Mostly narcotic bowel syndrome in the background of pain med seeking behavior  She is diabetic but her HbA1C levels has been normal whenever checked, so unlikely for gastroparesis      Other  Frequent hospital admissions  She had numerous  IP admissions in this hospital alone  19 CT abdomen/pelvis  CTA chest x 5  Numerous CXR/KUB/EGD /Colonoscopy in various hospitals  Pt shops hospitals in [place] very frequently     She will most likely develop radiation associated complications in near future      Drug-seeking behavior  Aware       Thrombocytopenia  Aware   Recent Labs   Lab 06/23/24  0712   *           Deep vein thrombosis (DVT) of non-extremity vein  H/o DVT in 2022?  And still on NOAC   Rpt US was negative this admission         Final Active Diagnoses:    Diagnosis Date Noted POA    PRINCIPAL PROBLEM:  Dialysis patient, noncompliant [Z91.158] 06/22/2024 Not Applicable     "Seizure [R56.9] 06/22/2024 Yes    Anemia [D64.9] 06/22/2024 Yes    Abdominal pain [R10.9] 06/22/2024 Yes    History of diabetes mellitus, type II [Z86.39] 06/22/2024 Yes    Frequent hospital admissions [Z78.9] 03/10/2023 Yes    Drug-seeking behavior [Z76.5] 01/24/2023 Yes    Thrombocytopenia [D69.6] 10/26/2022 Yes     Chronic    Deep vein thrombosis (DVT) of non-extremity vein [I82.90] 07/26/2022 Yes     Chronic    ESRD (end stage renal disease) [N18.6] 07/22/2022 Yes      Problems Resolved During this Admission:       Discharged Condition: good    Disposition: Home or Self Care    Follow Up:   Follow-up Information       Melony Kim NP Follow up in 1 week(s).    Specialty: Family Medicine  Contact information:  [address] [facility] [address] [phone]                           Patient Instructions:   No discharge procedures on file.    Significant Diagnostic Studies: Labs: CMP   Recent Labs   Lab 06/22/24  1145 06/23/24  0712    139   K 4.6 4.2   CL 99 102   CO2 24 25   * 166*   BUN 45* 27*   CREATININE 5.9* 4.1*   CALCIUM 8.7 8.6*   PROT  --  5.8*   ALBUMIN 4.1 3.4*   BILITOT  --  1.7*   ALKPHOS  --  119   AST  --  44*   ALT  --  42   ANIONGAP 22* 12    and CBC   Recent Labs   Lab 06/22/24  1145 06/23/24  0712   WBC 11.11 6.41   HGB 7.1* 7.7*   HCT 21.8* 24.0*   * 125*       Pending Diagnostic Studies:       None           Medications:  Reconciled Home Medications:      Medication List        START taking these medications      pantoprazole 40 MG tablet  Commonly known as: PROTONIX  Take 1 tablet (40 mg total) by mouth once daily.  Start taking on: June 24, 2024            CHANGE how you take these medications      acetaZOLAMIDE 250 MG tablet  Commonly known as: DIAMOX  take 1 tablet by mouth 3 times a day  What changed:   how much to take  how to take this  when to take this     BD ULTRA-FINE MINI PEN NEEDLE 31 gauge x 3/16" Ndle  Generic drug: pen needle, diabetic  Use as " directed with insulin once daily  What changed: Another medication with the same name was removed. Continue taking this medication, and follow the directions you see here.     brinzolamide 1 % ophthalmic suspension  Commonly known as: AZOPT  Place1 drop in left eye 3 times a day for 30 days  What changed:   how much to take  how to take this  when to take this     carvediloL 25 MG tablet  Commonly known as: COREG  take 1 tablet Oral 2 times daily  What changed:   how much to take  how to take this  when to take this     cetirizine 10 MG tablet  Commonly known as: ZYRTEC  Take 1 tablet every day by oral route.  What changed: when to take this     FLUoxetine 20 MG capsule  Take 1 capsule every day by oral route for 90 days.  What changed: Another medication with the same name was removed. Continue taking this medication, and follow the directions you see here.     fluticasone propionate 50 mcg/actuation nasal spray  Commonly known as: FLONASE ALLERGY RELIEF  Spray 1 spray every day by intranasal route.  What changed:   how much to take  when to take this     gabapentin 300 MG capsule  Commonly known as: NEURONTIN  Take 2 capsules twice a day by oral route for 90 days.  What changed:   how much to take  how to take this  when to take this     hydrALAZINE 25 MG tablet  Commonly known as: APRESOLINE  take 1 tablet by mouth every 8 hours  What changed:   how much to take  how to take this  when to take this  Another medication with the same name was removed. Continue taking this medication, and follow the directions you see here.     LANTUS U-100 INSULIN 100 unit/mL injection  Generic drug: insulin glargine U-100 (Lantus)  inject 13 unit subcutaneously 2 times daily  What changed:   how much to take  when to take this     ondansetron 4 MG Tbdl  Commonly known as: ZOFRAN-ODT  Place 1 tablet (4 mg) on or under the tongue every 8 hours for 10 days.  What changed:   how much to take  how to take this  when to take  this  reasons to take this     prednisoLONE acetate 1 % Drps  Commonly known as: PRED FORTE  Place 1 drop into the left eye 2 (two) times daily.  What changed: Another medication with the same name was removed. Continue taking this medication, and follow the directions you see here.     predniSONE 20 MG tablet  Commonly known as: DELTASONE  take 3 tablets Oral Daily,x30 day(s)  What changed:   how much to take  how to take this  when to take this     sucralfate 100 mg/mL suspension  Commonly known as: CARAFATE  Take 10 mL 4 times a day by oral route before meals for 30 days.  What changed:   how much to take  how to take this  when to take this     timolol maleate 0.5% 0.5 % Drop  Commonly known as: TIMOPTIC  Place 1 drop in left eye 2 times a day for 30 days  What changed:   how much to take  how to take this  when to take this     tobramycin-dexAMETHasone 0.3-0.1% 0.3-0.1 % Drps  Commonly known as: Tobradex  1 drop Eye-Left every 6 hours for 5 day(s)  What changed:   how much to take  how to take this  when to take this            CONTINUE taking these medications      ALPHAGAN P 0.15 % ophthalmic solution  Generic drug: brimonidine 0.15 % OPTH DROP  Place 1 drop into both eyes 3 (three) times daily.     amLODIPine 5 MG tablet  Commonly known as: NORVASC  Take 1 tablet (5 mg total) by mouth once daily.     apixaban 5 mg Tab  Commonly known as: ELIQUIS  Take 1 tablet (5 mg total) by mouth 2 (two) times daily. Patient w/ thrombocytopenia <50, so held during admission, follow-up with hematologist about restarting     atropine 1% 1 % Drop  Commonly known as: ISOPTO ATROPINE  Place 1 drop into the left eye 2 (two) times a day.     busPIRone 10 MG tablet  Commonly known as: BUSPAR  Take 1 tablet (10 mg total) by mouth 3 (three) times daily as needed (anxiety).     DEXCOM G7  Misc  Generic drug: blood-glucose meter,continuous  USE WITH SENSORS     DEXCOM G7 SENSOR Heather  Generic drug: blood-glucose sensor  CHANGE  EVERY 10 DAYS     * dorzolamide-timolol 2-0.5% 22.3-6.8 mg/mL ophthalmic solution  Commonly known as: COSOPT  Place 1 drop into the left eye every 12 (twelve) hours.     * dorzolamide-timolol 2-0.5% 22.3-6.8 mg/mL ophthalmic solution  Commonly known as: COSOPT  place 1 drop in left eye twice a day  for 30 days     isosorbide mononitrate 30 MG 24 hr tablet  Commonly known as: IMDUR  Take 1 tablet (30 mg total) by mouth once daily.     * lancets 33 gauge Misc  Use to test twice daily     * TRUEPLUS LANCETS 33 gauge Misc  Generic drug: lancets  Use 1 to check blood glucose three times daily     LEVEMIR FLEXPEN 100 unit/mL (3 mL) Inpn pen  Generic drug: insulin detemir U-100 (Levemir)  Inject 22 Units into the skin every evening. Patient not needing insulin in-patient. Follow-up with PCP for hospital follow-up and restarting the medication. Or restart medication if glucose >200 consistently resume home insulin regimen. Or if glucose is persistently less than 100, decrease by 5 units until following up with PCP     levETIRAcetam 500 MG Tab  Commonly known as: KEPPRA  Take 1 tablet (500 mg total) by mouth 2 (two) times daily.     oxyCODONE-acetaminophen  mg per tablet  Commonly known as: PERCOCET  Take 1 tablet by mouth every 4 (four) hours as needed for Pain.     sevelamer carbonate 800 mg Tab  Commonly known as: RENVELA  Take 1 tablet (800 mg total) by mouth 3 (three) times daily with meals.     TRUE METRIX GLUCOSE METER Misc  Generic drug: blood-glucose meter  Use to check blood glucose     TRUE METRIX GLUCOSE TEST STRIP Strp  Generic drug: blood sugar diagnostic  Use 1 strip to check blood glucose three times daily           * This list has 4 medication(s) that are the same as other medications prescribed for you. Read the directions carefully, and ask your doctor or other care provider to review them with you.                  Indwelling Lines/Drains at time of discharge:   Lines/Drains/Airways       Central  Venous Catheter Line  Duration                  Hemodialysis Catheter 06/17/23 1135 right internal jugular 371 days              Drain  Duration                  Hemodialysis AV Fistula Left upper arm -- days                    Time spent on the discharge of patient: 40 minutes         Mohini Yusuf MD  Department of Hospital Medicine  [facility] - Emergency Dept

## 2024-06-23 NOTE — ASSESSMENT & PLAN NOTE
Creatine stable for now. BMP reviewed- noted Estimated Creatinine Clearance: 15.1 mL/min (A) (based on SCr of 4.1 mg/dL (H)). according to latest data. Based on current GFR, CKD stage is end stage.  Monitor UOP and serial BMP and adjust therapy as needed. Renally dose meds. Avoid nephrotoxic medications and procedures.

## 2024-06-23 NOTE — ASSESSMENT & PLAN NOTE
She had numerous  IP admissions in this hospital alone  19 CT abdomen/pelvis  CTA chest x 5  Numerous CXR/KUB/EGD /Colonoscopy in various hospitals  Pt shops hospitals in [place] very frequently     She will most likely develop radiation associated complications in near future

## 2024-06-23 NOTE — PLAN OF CARE
Chart and discharge orders reviewed.  Patient cleared for discharge from case management standpoint.       06/23/24 1141   Final Note   Assessment Type Final Discharge Note   Anticipated Discharge Disposition Home   Post-Acute Status   Discharge Delays None known at this time

## 2024-06-23 NOTE — HOSPITAL COURSE
Patient was admitted with missed dialysis, Sob and hypertensive urgency. Nephrology was consulted and patient was dialysed. Patient is noncompliant with medications. Home medications resumed. Per nephrology to resume routine dialysis.

## 2024-06-23 NOTE — CONSULTS
"Nephrology Consult Note        Patient Name: Tabby Howard  MRN: [id number]    Patient Class: OP- Observation   Admission Date: 6/22/2024  Length of Stay: 0 days  Date of Service: 6/23/2024    Attending Physician: Mohini Yusuf MD  Primary Care Provider: Melony Kim NP    Reason for Consult: esrd    SUBJECTIVE:     HPI: 34F with ERSD on HD presents with generalized discomfort. She missed dialysis today and will need to do HD.     6/22 Seen on HD today, tolerating well.  6/23 VSS. Plan HD tomorrow and consider 1 PRBC if Hgb not better after restarting Epo. Keep on MWF schedule for now.    ASSESSMENT/PLAN:     ESRD on HD MWF  via AVF  Next dialysis per schedule or PRN.  Continue current dialysis prescription and adjust as needed.  Renal diet - low K, low phos as tolerated.  No IVs or BP checks on access and/or non-dominant arm.    Anemia of CKD  Hgb and HCT are acceptable. Monitor for now.  Will provide SHELLEY and/or IV iron as needed to keep Hgb 8-10 range.  Consider 1 PRBC if Hgb not better.    MBD / Secondary HPT  Ca, Phos, PTH and vitamin D levels are acceptable.   Phos binders, vitamin D and analogues, calcimimetics will be given as needed.    HTN  Tolerate asymptomatic HTN up to -160.  Continue home meds.  Low sodium diet as tolerated.    Thank you for allowing us to participate in the care of your patient!   We will follow the patient and provide recommendations as needed.         Laboratory:  Recent Labs   Lab 06/22/24  1145 06/23/24  0712    139   K 4.6 4.2   CL 99 102   CO2 24 25   BUN 45* 27*   CREATININE 5.9* 4.1*   * 166*       Recent Labs   Lab 06/22/24  1145 06/23/24  0712   CALCIUM 8.7 8.6*   ALBUMIN 4.1 3.4*   PHOS 7.5*  --    MG  --  1.9       Recent Labs   Lab 07/08/22  0516   PTH, Intact 289.8 H       No results for input(s): "POCTGLUCOSE" in the last 168 hours.    Recent Labs   Lab 03/03/23  0547 08/01/23  0813 06/23/24  0712   Hemoglobin A1C 5.8 5.8 8.5 H "       Recent Labs   Lab 06/22/24  1145 06/23/24  0712   WBC 11.11 6.41   HGB 7.1* 7.7*   HCT 21.8* 24.0*   * 125*   MCV 92 92   MCHC 32.6 32.1   MONO 7.2  0.8 8.0  0.5   EOSINOPHIL 0.2 1.4       Recent Labs   Lab 06/22/24  1145 06/23/24  0712   BILITOT  --  1.7*   PROT  --  5.8*   ALBUMIN 4.1 3.4*   ALKPHOS  --  119   ALT  --  42   AST  --  44*       Recent Labs   Lab 03/16/22  1848 05/15/22  2022 02/05/23  0156 09/19/23  1422 10/13/23  1001   Color, UA Pale Yellow   < > Yellow Yellow Yellow   Appearance, UA  --    < > Clear Clear Clear   pH, UA  --    < > >8.0 A >8.0 A 8.0   Specific Gravity, UA  --    < > 1.020 1.020 1.015   Protein, UA  --    < > 3+ A 4+ 4+   Glucose, UA 50 mg/dL A   < > 2+ A 4+ A 3+ A   Ketones, UA  --    < > 1+ A Trace A Trace A   Urobilinogen, UA Normal   < > Negative Negative Negative   Bilirubin (UA) Negative   < > 2+ A Negative 1+ A   Occult Blood UA  --    < > 3+ A 2+ A Negative   Blood, UA 0.03 mg/dL A   < >  --   --   --    Nitrite, UA  --    < > Negative Negative Negative   RBC, UA  --    < > 15 H 17 H 8 H   WBC, UA  --    < > 1 2 5   Bacteria  --    < > Rare Negative Negative   Mucus, UA Rare A  --   --   --   --    Hyaline Casts, UA  --    < > 2 A 1 2 A    < > = values in this interval not displayed.       Recent Labs   Lab 10/03/23  2105 10/04/23  0438 10/15/23  0353 02/24/24  1855   POC PH 7.290 LL 7.305 L 7.239 LL  --    POC PCO2 45.2 H 43.6 37.8  --    POC HCO3 21.7 L 21.7 L 16.1 L  --    POC PO2 110 H 92 37 LL  --    POC SATURATED O2 98 96 60  --    POC BE -5 -5 -11  --    Sample ARTERIAL ARTERIAL VENOUS PIETER       Microbiology Results (last 7 days)       ** No results found for the last 168 hours. **            Review of patient's allergies indicates:   Allergen Reactions    Droperidol Hives    Penicillins Hives       Outpatient meds:  No current facility-administered medications on file prior to encounter.     Current Outpatient Medications on File Prior to Encounter    Medication Sig Dispense Refill    acetaZOLAMIDE (DIAMOX) 250 MG tablet take 1 tablet by mouth 3 times a day (Patient taking differently: Take 250 mg by mouth 3 (three) times daily.) 90 tablet 3    amLODIPine (NORVASC) 5 MG tablet Take 1 tablet (5 mg total) by mouth once daily. 30 tablet 11    apixaban (ELIQUIS) 5 mg Tab Take 1 tablet (5 mg total) by mouth 2 (two) times daily. Patient w/ thrombocytopenia <50, so held during admission, follow-up with hematologist about restarting 180 tablet 1    atropine 1% (ISOPTO ATROPINE) 1 % Drop Place 1 drop into the left eye 2 (two) times a day. 15 mL 3    blood sugar diagnostic (TRUE METRIX GLUCOSE TEST STRIP) Strp Use 1 strip to check blood glucose three times daily 100 each 11    blood-glucose meter (TRUE METRIX GLUCOSE METER) Misc Use to check blood glucose 1 each 0    blood-glucose meter,continuous (DEXCOM ) Misc USE WITH SENSORS 1 each 0    blood-glucose sensor Heather CHANGE EVERY 10 DAYS 3 each 0    brimonidine 0.15 % OPTH DROP (ALPHAGAN) 0.15 % ophthalmic solution Place 1 drop into both eyes 3 (three) times daily. 15 mL 3    brinzolamide (AZOPT) 1 % ophthalmic suspension Place1 drop in left eye 3 times a day for 30 days (Patient taking differently: Place 1 drop into the left eye 3 (three) times daily.) 15 mL 6    busPIRone (BUSPAR) 10 MG tablet Take 1 tablet (10 mg total) by mouth 3 (three) times daily as needed (anxiety). 60 tablet 5    carvediloL (COREG) 25 MG tablet take 1 tablet Oral 2 times daily (Patient taking differently: Take 25 mg by mouth 2 (two) times daily.) 60 tablet 0    cetirizine (ZYRTEC) 10 MG tablet Take 1 tablet every day by oral route. (Patient taking differently: Take 10 mg by mouth once daily.) 30 tablet 0    dorzolamide-timolol 2-0.5% (COSOPT) 22.3-6.8 mg/mL ophthalmic solution Place 1 drop into the left eye every 12 (twelve) hours. 10 mL 6    dorzolamide-timolol 2-0.5% (COSOPT) 22.3-6.8 mg/mL ophthalmic solution place 1 drop in left eye  twice a day  for 30 days 10 mL 0    FLUoxetine 20 MG capsule Take 1 capsule every day by oral route for 90 days. 90 capsule 1    FLUoxetine 20 MG capsule Take 1 capsule (20 mg total) by mouth once daily, for Mood.. 90 capsule 2    fluticasone propionate (FLONASE ALLERGY RELIEF) 50 mcg/actuation nasal spray Brandon 1 spray every day by intranasal route. (Patient taking differently: 1 spray by Each Nostril route Daily.) 16 g 2    gabapentin (NEURONTIN) 300 MG capsule Take 2 capsules twice a day by oral route for 90 days. (Patient taking differently: Take 2 capsules by mouth 2 (two) times daily.) 360 capsule 1    hydrALAZINE (APRESOLINE) 100 MG tablet Take 1 tablet (100 mg total) by mouth 2 (two) times daily. 180 tablet 1    hydrALAZINE (APRESOLINE) 25 MG tablet take 1 tablet by mouth every 8 hours (Patient taking differently: Take 25 mg by mouth every 8 (eight) hours.) 90 tablet 0    insulin detemir U-100, Levemir, (LEVEMIR FLEXTOUCH U100 INSULIN) 100 unit/mL (3 mL) InPn pen Inject 22 Units into the skin every evening. Patient not needing insulin in-patient. Follow-up with PCP for hospital follow-up and restarting the medication. Or restart medication if glucose >200 consistently resume home insulin regimen. Or if glucose is persistently less than 100, decrease by 5 units until following up with PCP 15 mL 1    insulin glargine U-100, Lantus, 100 unit/mL injection inject 13 unit subcutaneously 2 times daily (Patient taking differently: Inject 13 Units into the skin 2 (two) times a day.) 10 mL 0    isosorbide mononitrate (IMDUR) 30 MG 24 hr tablet Take 1 tablet (30 mg total) by mouth once daily. 90 tablet 1    lancets (TRUEPLUS LANCETS) 33 gauge Misc Use 1 to check blood glucose three times daily 100 each 11    lancets 33 gauge Misc Use to test twice daily 100 each 5    levETIRAcetam (KEPPRA) 500 MG Tab Take 1 tablet (500 mg total) by mouth 2 (two) times daily. 60 tablet 11    ondansetron (ZOFRAN-ODT) 4 MG TbDL Place 1  "tablet (4 mg) on or under the tongue every 8 hours for 10 days. (Patient taking differently: Take 4 mg by mouth every 8 (eight) hours as needed (Nausea).) 24 tablet 2    oxyCODONE-acetaminophen (PERCOCET)  mg per tablet Take 1 tablet by mouth every 4 (four) hours as needed for Pain. 42 tablet 0    pen needle, diabetic 31 gauge x 3/16" Ndle Use as directed to inject insulin 5 times daily 100 each 11    pen needle, diabetic 31 gauge x 3/16" Ndle Use as directed with insulin once daily 100 each 0    prednisoLONE acetate (PRED FORTE) 1 % DrpS Place 1 drop into the left eye 2 (two) times daily. 5 mL 3    prednisoLONE acetate (PRED FORTE) 1 % DrpS Place 1 drop in left eye 4 times daily for 5 days (Patient taking differently: Place 1 drop into the left eye 4 (four) times daily.) 5 mL 0    predniSONE (DELTASONE) 20 MG tablet take 3 tablets Oral Daily,x30 day(s) (Patient taking differently: Take 3 tablets by mouth once daily.) 90 tablet 0    sevelamer carbonate (RENVELA) 800 mg Tab Take 1 tablet (800 mg total) by mouth 3 (three) times daily with meals. 180 tablet 11    sucralfate (CARAFATE) 100 mg/mL suspension Take 10 mL 4 times a day by oral route before meals for 30 days. (Patient taking differently: Take 10 mLs by mouth 4 (four) times daily with meals and nightly.) 1200 mL 1    timolol maleate 0.5% (TIMOPTIC) 0.5 % Drop Place 1 drop in left eye 2 times a day for 30 days (Patient taking differently: Place 1 drop into the left eye 2 (two) times daily.) 5 mL 6    tobramycin-dexAMETHasone 0.3-0.1% (TOBRADEX) 0.3-0.1 % DrpS 1 drop Eye-Left every 6 hours for 5 day(s) (Patient taking differently: Place 1 drop into the left eye every 6 (six) hours.) 5 mL 0    [DISCONTINUED] atenoloL (TENORMIN) 50 MG tablet Take 1 tablet (50 mg total) by mouth every other day. 45 tablet 3    [DISCONTINUED] omeprazole (PRILOSEC) 20 MG capsule Take 2 capsules (40 mg total) by mouth once daily. for 10 days 20 capsule 0       Scheduled meds:   " amLODIPine  5 mg Oral Daily    atropine 1%  1 drop Left Eye BID    carvediloL  12.5 mg Oral BID    heparin (porcine)  5,000 Units Subcutaneous Q8H    hydrALAZINE  100 mg Oral BID    isosorbide mononitrate  30 mg Oral Daily    levETIRAcetam  500 mg Oral BID    pantoprazole  40 mg Oral Daily       Infusions:      PRN meds:    Current Facility-Administered Medications:     acetaminophen, 650 mg, Oral, Q8H PRN    acetaminophen, 650 mg, Oral, Q4H PRN    aluminum-magnesium hydroxide-simethicone, 30 mL, Oral, QID PRN    hydrALAZINE, 10 mg, Intravenous, Q4H PRN    HYDROcodone-acetaminophen, 1 tablet, Oral, Q6H PRN    HYDROmorphone, 0.5 mg, Intravenous, Q6H PRN    melatonin, 6 mg, Oral, Nightly PRN    naloxone, 0.02 mg, Intravenous, PRN    ondansetron, 4 mg, Intravenous, Q6H PRN    Past Medical History:   Diagnosis Date    ESRD on hemodialysis 2022    Gastritis 2022    EGD was 21    Gastroparesis 2022    has not had Emptying study    Heart failure with preserved ejection fraction 2022    EF 55% on 3/22    History of supraventricular tachycardia     Hyperkalemia 2022    Hypertensive emergency 2022    Sickle cell trait 2022    Type 2 diabetes mellitus      Past Surgical History:   Procedure Laterality Date     SECTION      x 3    COLONOSCOPY      COLONOSCOPY N/A 2022    Procedure: COLONOSCOPY;  Surgeon: Jagdeep Cedeno MD;  Location: [facility];  Service: Endoscopy;  Laterality: N/A;    DESTRUCTION, CILIARY BODY, USING LASER Left 3/8/2024    Procedure: Cyclophotocoagulation G-probe, retrobulbar chlorpromazine left eye;  Surgeon: Fidel Wise MD;  Location: [facility] OR [address] [place] [address];  Service: Ophthalmology;  Laterality: Left;    ENDOSCOPIC ULTRASOUND OF UPPER GASTROINTESTINAL TRACT N/A 2023    Procedure: ULTRASOUND, UPPER GI TRACT, ENDOSCOPIC;  Surgeon: Micky Paredes III, MD;  Location: [facility];  Service: Endoscopy;  Laterality: N/A;    ESOPHAGOGASTRODUODENOSCOPY  N/A 10/18/2019    Procedure: ESOPHAGOGASTRODUODENOSCOPY (EGD);  Surgeon: Gianluca Mendez MD;  Location: [facility] ENDO;  Service: Endoscopy;  Laterality: N/A;    ESOPHAGOGASTRODUODENOSCOPY N/A 08/24/2022    Procedure: EGD (ESOPHAGOGASTRODUODENOSCOPY);  Surgeon: Micky Paredes III, MD;  Location: [facility] ENDO;  Service: Endoscopy;  Laterality: N/A;    ESOPHAGOGASTRODUODENOSCOPY N/A 12/5/2022    Procedure: EGD (ESOPHAGOGASTRODUODENOSCOPY);  Surgeon: Marcelo Zhong MD;  Location: [facility] ENDO;  Service: Endoscopy;  Laterality: N/A;    INSERTION, CATHETER, TUNNELED N/A 6/17/2023    Procedure: Insertion,catheter,tunneled;  Surgeon: Carlos Thurman Jr., MD;  Location: [facility] OR;  Service: General;  Laterality: N/A;    LAPAROSCOPIC CHOLECYSTECTOMY N/A 07/30/2022    Procedure: CHOLECYSTECTOMY, LAPAROSCOPIC;  Surgeon: Grey Perez MD;  Location: [facility] OR;  Service: General;  Laterality: N/A;    PLACEMENT OF DUAL-LUMEN VASCULAR CATHETER Left 07/12/2022    Procedure: INSERTION-CATHETER-JOSEPH;  Surgeon: Dionte Gan MD;  Location: [facility] OR;  Service: General;  Laterality: Left;    PLACEMENT OF DUAL-LUMEN VASCULAR CATHETER Right 07/26/2022    Procedure: INSERTION-CATHETER-Hemosplit;  Surgeon: Dionte Gan MD;  Location: [facility] OR;  Service: General;  Laterality: Right;     Family History   Problem Relation Name Age of Onset    Diabetes Mother      Diabetes Father       Social History     Tobacco Use    Smoking status: Never    Smokeless tobacco: Never   Substance Use Topics    Alcohol use: Not Currently    Drug use: No       OBJECTIVE:     Vital Signs and IO:  Temp:  [98 °F (36.7 °C)-98.8 °F (37.1 °C)]   Pulse:  []   Resp:  [13-25]   BP: (117-241)/()   SpO2:  [92 %-100 %]   I/O last 3 completed shifts:  In: -   Out: 4000 [Other:4000]  Wt Readings from Last 5 Encounters:   06/22/24 52.6 kg (116 lb)   05/13/24 57 kg (125 lb 10.6 oz)   03/30/24 53.1 kg (117 lb)   03/08/24 53.1 kg (117 lb)   03/06/24 52.6 kg (116  lb)     Body mass index is 21.22 kg/m².    Physical Exam  Constitutional:       General: Patient is not in acute distress.     Appearance: Patient is well-developed. She is not diaphoretic.   HENT:      Head: Normocephalic and atraumatic.      Mouth/Throat: Mucous membranes are moist.   Eyes:      General: No scleral icterus.     Pupils: Pupils are equal, round, and reactive to light.   Cardiovascular:      Rate and Rhythm: Normal rate and regular rhythm.   Pulmonary:      Effort: Pulmonary effort is normal. No respiratory distress.      Breath sounds: No stridor.   Abdominal:      General: There is no distension.      Palpations: Abdomen is soft.   Musculoskeletal:         General: No deformity. Normal range of motion.      Cervical back: Neck supple.   Skin:     General: Skin is warm and dry.      Findings: No rash present. No erythema.   Neurological:      Mental Status: Patient is alert and oriented to person, place, and time.      Cranial Nerves: No cranial nerve deficit.   Psychiatric:         Behavior: Behavior normal.          Patient care time was spent personally by me on the following activities:     Obtaining a history.  Examination of patient.  Providing medical care at the patients bedside.  Developing a treatment plan with patient or surrogate and bedside caregivers.  Ordering and reviewing laboratory studies, radiographic studies, pulse oximetry.  Ordering and performing treatments and interventions.  Evaluation of patient's response to treatment.  Discussions with consultants while on the unit and immediately available to the patient.  Re-evaluation of the patient's condition.  Documentation in the medical record.     Mando Benitez MD    [facility]  [address] [place] [id number]    [phone] - tel  [phone] - fax    6/23/2024

## 2024-06-23 NOTE — PLAN OF CARE
[facility] - Emergency Dept  Initial Discharge Assessment       Primary Care Provider: Melony Kim NP    Admission Diagnosis: Dialysis patient, noncompliant [Z91.158]    Admission Date: 6/22/2024  Expected Discharge Date:     Presented at pt's bedside to complete discharge assessment. Pt AAOx4s. Demographics, PCP, and insurance verified. Pt informed No home health, [facility], No Coumadin therapy. Pt reports ability to complete ADLs without the assistance. Pt verbalized plan to discharge home via family transport. Pt has no other needs to be addressed at this time.        Transition of Care Barriers: None    Payor: MEDICAID / Plan: [place] / Product Type: Managed Medicaid /     Extended Emergency Contact Information  Primary Emergency Contact: Tanya Howard  Address: [address] [place], [place] [id number] [facility]  Home Phone: [phone]  Mobile Phone: [phone]  Relation: Step parent  Preferred language: English   needed? No  Secondary Emergency Contact: LeeGarcia  Address: [address] [place] [address]  Mobile Phone: [phone]  Relation: Father  Preferred language: English   needed? No    Discharge Plan A: Home  Discharge Plan B: Home with family      [facility]  [address]  [facility] [address]  Phone: [phone] Fax: [phone]      Initial Assessment (most recent)       Adult Discharge Assessment - 06/23/24 0848          Discharge Assessment    Assessment Type Discharge Planning Assessment     Confirmed/corrected address, phone number and insurance Yes     Confirmed Demographics Correct on Facesheet     Source of Information patient     Reason For Admission Dialysis patient, noncompliant     People in Home child(tammy), dependent     Facility Arrived From: Home     Do you expect to return to your current living situation? Yes     Do you have help at home or someone to  help you manage your care at home? No     Who are your caregiver(s) and their phone number(s)? Tanya Howard  Step parent  [phone]    2  Garcia Howard  Father  [phone]     Prior to hospitilization cognitive status: Alert/Oriented     Current cognitive status: Alert/Oriented     Walking or Climbing Stairs Difficulty no     Dressing/Bathing Difficulty no     Home Accessibility not wheelchair accessible     Home Layout Able to live on 1st floor     Equipment Currently Used at Home none     Readmission within 30 days? No     Patient currently being followed by outpatient case management? No     Do you currently have service(s) that help you manage your care at home? No     Do you take prescription medications? Yes     Do you have prescription coverage? Yes     Coverage MED[place] [facility] [place]     Do you have any problems affording any of your prescribed medications? No     Is the patient taking medications as prescribed? yes     How do you get to doctors appointments? family or friend will provide     Are you on dialysis? Yes     Dialysis Name and Scheduled days [facility]     Do you take coumadin? No     Discharge Plan A Home     Discharge Plan B Home with family     DME Needed Upon Discharge  none     Discharge Plan discussed with: Patient     Transition of Care Barriers None

## 2024-06-23 NOTE — ASSESSMENT & PLAN NOTE
Mostly narcotic bowel syndrome in the background of pain med seeking behavior  She is diabetic but her HbA1C levels has been normal whenever checked, so unlikely for gastroparesis

## 2024-06-23 NOTE — ASSESSMENT & PLAN NOTE
Current CBC reviewed-   Lab Results   Component Value Date    HGB 7.7 (L) 06/23/2024    HCT 24.0 (L) 06/23/2024     Cont Epo with dialysis

## 2024-06-25 LAB
OHS QRS DURATION: 80 MS
OHS QTC CALCULATION: 498 MS

## 2024-06-28 ENCOUNTER — DOCUMENTATION ONLY (OUTPATIENT)
Dept: TRANSPLANT | Facility: CLINIC | Age: 34
End: 2024-06-28
Payer: MEDICAID

## 2024-07-16 ENCOUNTER — HOSPITAL ENCOUNTER (INPATIENT)
Facility: HOSPITAL | Age: 34
LOS: 5 days | Discharge: HOME | DRG: 637 | End: 2024-07-23
Attending: EMERGENCY MEDICINE | Admitting: HOSPITALIST
Payer: MEDICAID

## 2024-07-16 DIAGNOSIS — R10.9 ABDOMINAL PAIN: ICD-10-CM

## 2024-07-16 DIAGNOSIS — N18.6 ESRD (END STAGE RENAL DISEASE): ICD-10-CM

## 2024-07-16 DIAGNOSIS — R10.9 ABDOMINAL PAIN, UNSPECIFIED ABDOMINAL LOCATION: Primary | ICD-10-CM

## 2024-07-16 DIAGNOSIS — I31.4 CARDIAC TAMPONADE: ICD-10-CM

## 2024-07-16 DIAGNOSIS — R56.9 SEIZURES: ICD-10-CM

## 2024-07-16 DIAGNOSIS — Z99.2 ADMISSION FOR DIALYSIS: ICD-10-CM

## 2024-07-16 DIAGNOSIS — I31.39 PERICARDIAL EFFUSION: ICD-10-CM

## 2024-07-16 DIAGNOSIS — K52.9 COLITIS: ICD-10-CM

## 2024-07-16 DIAGNOSIS — E11.10 DKA, TYPE 2: ICD-10-CM

## 2024-07-16 DIAGNOSIS — R07.9 CHEST PAIN: ICD-10-CM

## 2024-07-16 DIAGNOSIS — R06.02 SHORTNESS OF BREATH: ICD-10-CM

## 2024-07-16 LAB
ALBUMIN SERPL BCP-MCNC: 3.5 G/DL (ref 3.5–5.2)
ALP SERPL-CCNC: 79 U/L (ref 55–135)
ALT SERPL W/O P-5'-P-CCNC: 32 U/L (ref 10–44)
ANION GAP SERPL CALC-SCNC: 11 MMOL/L (ref 8–16)
AST SERPL-CCNC: 28 U/L (ref 10–40)
BASOPHILS # BLD AUTO: 0.01 K/UL (ref 0–0.2)
BASOPHILS NFR BLD: 0.1 % (ref 0–1.9)
BILIRUB SERPL-MCNC: 1.2 MG/DL (ref 0.1–1)
BNP SERPL-MCNC: 1875 PG/ML (ref 0–99)
BUN SERPL-MCNC: 52 MG/DL (ref 6–20)
CALCIUM SERPL-MCNC: 8 MG/DL (ref 8.7–10.5)
CHLORIDE SERPL-SCNC: 100 MMOL/L (ref 95–110)
CK SERPL-CCNC: 101 U/L (ref 20–180)
CO2 SERPL-SCNC: 27 MMOL/L (ref 23–29)
CREAT SERPL-MCNC: 6 MG/DL (ref 0.5–1.4)
DIFFERENTIAL METHOD BLD: ABNORMAL
EOSINOPHIL # BLD AUTO: 0 K/UL (ref 0–0.5)
EOSINOPHIL NFR BLD: 0.1 % (ref 0–8)
ERYTHROCYTE [DISTWIDTH] IN BLOOD BY AUTOMATED COUNT: 18.5 % (ref 11.5–14.5)
EST. GFR  (NO RACE VARIABLE): 8.8 ML/MIN/1.73 M^2
GLUCOSE SERPL-MCNC: 168 MG/DL (ref 70–110)
GLUCOSE SERPL-MCNC: 183 MG/DL (ref 70–110)
GLUCOSE SERPL-MCNC: 265 MG/DL (ref 70–110)
GROUP A STREP, MOLECULAR: NEGATIVE
HCG INTACT+B SERPL-ACNC: 1.13 MIU/ML
HCT VFR BLD AUTO: 23.8 % (ref 37–48.5)
HGB BLD-MCNC: 7.8 G/DL (ref 12–16)
IMM GRANULOCYTES # BLD AUTO: 0.06 K/UL (ref 0–0.04)
IMM GRANULOCYTES NFR BLD AUTO: 0.7 % (ref 0–0.5)
INFLUENZA A, MOLECULAR: NEGATIVE
INFLUENZA B, MOLECULAR: NEGATIVE
INR PPP: 1 (ref 0.8–1.2)
LDH SERPL L TO P-CCNC: 1.07 MMOL/L (ref 0.5–2.2)
LIPASE SERPL-CCNC: 17 U/L (ref 4–60)
LYMPHOCYTES # BLD AUTO: 0.2 K/UL (ref 1–4.8)
LYMPHOCYTES NFR BLD: 2.2 % (ref 18–48)
MAGNESIUM SERPL-MCNC: 2.1 MG/DL (ref 1.6–2.6)
MAGNESIUM SERPL-MCNC: 2.1 MG/DL (ref 1.6–2.6)
MCH RBC QN AUTO: 29.9 PG (ref 27–31)
MCHC RBC AUTO-ENTMCNC: 32.8 G/DL (ref 32–36)
MCV RBC AUTO: 91 FL (ref 82–98)
MONOCYTES # BLD AUTO: 0.5 K/UL (ref 0.3–1)
MONOCYTES NFR BLD: 5.7 % (ref 4–15)
NEUTROPHILS # BLD AUTO: 7.8 K/UL (ref 1.8–7.7)
NEUTROPHILS NFR BLD: 91.2 % (ref 38–73)
NRBC BLD-RTO: 0 /100 WBC
OHS QRS DURATION: 78 MS
OHS QTC CALCULATION: 479 MS
PLATELET # BLD AUTO: 71 K/UL (ref 150–450)
PMV BLD AUTO: 10.2 FL (ref 9.2–12.9)
POTASSIUM SERPL-SCNC: 4.2 MMOL/L (ref 3.5–5.1)
PROT SERPL-MCNC: 5.6 G/DL (ref 6–8.4)
PROTHROMBIN TIME: 10.9 SEC (ref 9–12.5)
RBC # BLD AUTO: 2.61 M/UL (ref 4–5.4)
RESPIRATORY INFECTION PANEL SOURCE: NORMAL
SAMPLE: NORMAL
SARS-COV-2 RDRP RESP QL NAA+PROBE: NEGATIVE
SODIUM SERPL-SCNC: 138 MMOL/L (ref 136–145)
SPECIMEN SOURCE: NORMAL
TROPONIN I SERPL HS-MCNC: 81.5 PG/ML (ref 0–14.9)
TROPONIN I SERPL HS-MCNC: 86.7 PG/ML (ref 0–14.9)
TSH SERPL DL<=0.005 MIU/L-ACNC: 1.35 UIU/ML (ref 0.34–5.6)
WBC # BLD AUTO: 8.6 K/UL (ref 3.9–12.7)

## 2024-07-16 PROCEDURE — 25500020 PHARM REV CODE 255: Performed by: EMERGENCY MEDICINE

## 2024-07-16 PROCEDURE — 87502 INFLUENZA DNA AMP PROBE: CPT | Performed by: EMERGENCY MEDICINE

## 2024-07-16 PROCEDURE — 87651 STREP A DNA AMP PROBE: CPT | Performed by: EMERGENCY MEDICINE

## 2024-07-16 PROCEDURE — 36415 COLL VENOUS BLD VENIPUNCTURE: CPT | Performed by: EMERGENCY MEDICINE

## 2024-07-16 PROCEDURE — 82962 GLUCOSE BLOOD TEST: CPT

## 2024-07-16 PROCEDURE — 84484 ASSAY OF TROPONIN QUANT: CPT | Mod: 91 | Performed by: HOSPITALIST

## 2024-07-16 PROCEDURE — 83880 ASSAY OF NATRIURETIC PEPTIDE: CPT | Performed by: EMERGENCY MEDICINE

## 2024-07-16 PROCEDURE — G0378 HOSPITAL OBSERVATION PER HR: HCPCS

## 2024-07-16 PROCEDURE — 63600175 PHARM REV CODE 636 W HCPCS: Mod: JZ,JG | Performed by: EMERGENCY MEDICINE

## 2024-07-16 PROCEDURE — 83690 ASSAY OF LIPASE: CPT | Performed by: EMERGENCY MEDICINE

## 2024-07-16 PROCEDURE — 84443 ASSAY THYROID STIM HORMONE: CPT | Performed by: EMERGENCY MEDICINE

## 2024-07-16 PROCEDURE — 96376 TX/PRO/DX INJ SAME DRUG ADON: CPT

## 2024-07-16 PROCEDURE — 99285 EMERGENCY DEPT VISIT HI MDM: CPT | Mod: 25

## 2024-07-16 PROCEDURE — 96375 TX/PRO/DX INJ NEW DRUG ADDON: CPT

## 2024-07-16 PROCEDURE — 84702 CHORIONIC GONADOTROPIN TEST: CPT | Performed by: EMERGENCY MEDICINE

## 2024-07-16 PROCEDURE — 85610 PROTHROMBIN TIME: CPT | Performed by: EMERGENCY MEDICINE

## 2024-07-16 PROCEDURE — 83735 ASSAY OF MAGNESIUM: CPT | Performed by: EMERGENCY MEDICINE

## 2024-07-16 PROCEDURE — 84484 ASSAY OF TROPONIN QUANT: CPT | Mod: 91 | Performed by: EMERGENCY MEDICINE

## 2024-07-16 PROCEDURE — 25000003 PHARM REV CODE 250: Performed by: EMERGENCY MEDICINE

## 2024-07-16 PROCEDURE — 82550 ASSAY OF CK (CPK): CPT | Performed by: EMERGENCY MEDICINE

## 2024-07-16 PROCEDURE — 85025 COMPLETE CBC W/AUTO DIFF WBC: CPT | Performed by: EMERGENCY MEDICINE

## 2024-07-16 PROCEDURE — 80053 COMPREHEN METABOLIC PANEL: CPT | Performed by: EMERGENCY MEDICINE

## 2024-07-16 PROCEDURE — U0002 COVID-19 LAB TEST NON-CDC: HCPCS | Performed by: EMERGENCY MEDICINE

## 2024-07-16 PROCEDURE — 87040 BLOOD CULTURE FOR BACTERIA: CPT | Mod: 59 | Performed by: EMERGENCY MEDICINE

## 2024-07-16 PROCEDURE — 96365 THER/PROPH/DIAG IV INF INIT: CPT

## 2024-07-16 PROCEDURE — 80307 DRUG TEST PRSMV CHEM ANLYZR: CPT | Performed by: EMERGENCY MEDICINE

## 2024-07-16 RX ORDER — LEVOFLOXACIN 5 MG/ML
750 INJECTION, SOLUTION INTRAVENOUS
Status: COMPLETED | OUTPATIENT
Start: 2024-07-16 | End: 2024-07-16

## 2024-07-16 RX ORDER — SODIUM,POTASSIUM PHOSPHATES 280-250MG
2 POWDER IN PACKET (EA) ORAL
Status: DISCONTINUED | OUTPATIENT
Start: 2024-07-16 | End: 2024-07-22

## 2024-07-16 RX ORDER — IBUPROFEN 200 MG
24 TABLET ORAL
Status: DISCONTINUED | OUTPATIENT
Start: 2024-07-16 | End: 2024-07-21

## 2024-07-16 RX ORDER — ACETAMINOPHEN 325 MG/1
650 TABLET ORAL EVERY 4 HOURS PRN
Status: DISCONTINUED | OUTPATIENT
Start: 2024-07-16 | End: 2024-07-21

## 2024-07-16 RX ORDER — GLUCAGON 1 MG
1 KIT INJECTION
Status: DISCONTINUED | OUTPATIENT
Start: 2024-07-16 | End: 2024-07-21

## 2024-07-16 RX ORDER — HYDROCODONE BITARTRATE AND ACETAMINOPHEN 5; 325 MG/1; MG/1
1 TABLET ORAL EVERY 6 HOURS PRN
Status: DISCONTINUED | OUTPATIENT
Start: 2024-07-16 | End: 2024-07-17

## 2024-07-16 RX ORDER — LANOLIN ALCOHOL/MO/W.PET/CERES
800 CREAM (GRAM) TOPICAL
Status: DISCONTINUED | OUTPATIENT
Start: 2024-07-16 | End: 2024-07-22

## 2024-07-16 RX ORDER — INSULIN ASPART 100 [IU]/ML
0-5 INJECTION, SOLUTION INTRAVENOUS; SUBCUTANEOUS
Status: DISCONTINUED | OUTPATIENT
Start: 2024-07-16 | End: 2024-07-17

## 2024-07-16 RX ORDER — MORPHINE SULFATE 2 MG/ML
1 INJECTION, SOLUTION INTRAMUSCULAR; INTRAVENOUS
Status: DISCONTINUED | OUTPATIENT
Start: 2024-07-16 | End: 2024-07-16

## 2024-07-16 RX ORDER — MORPHINE SULFATE 2 MG/ML
1 INJECTION, SOLUTION INTRAMUSCULAR; INTRAVENOUS
Status: COMPLETED | OUTPATIENT
Start: 2024-07-16 | End: 2024-07-16

## 2024-07-16 RX ORDER — ACETAMINOPHEN 325 MG/1
650 TABLET ORAL EVERY 8 HOURS PRN
Status: DISCONTINUED | OUTPATIENT
Start: 2024-07-16 | End: 2024-07-21

## 2024-07-16 RX ORDER — ONDANSETRON HYDROCHLORIDE 2 MG/ML
4 INJECTION, SOLUTION INTRAVENOUS
Status: COMPLETED | OUTPATIENT
Start: 2024-07-16 | End: 2024-07-16

## 2024-07-16 RX ORDER — TALC
6 POWDER (GRAM) TOPICAL NIGHTLY PRN
Status: DISCONTINUED | OUTPATIENT
Start: 2024-07-16 | End: 2024-07-23 | Stop reason: HOSPADM

## 2024-07-16 RX ORDER — IBUPROFEN 200 MG
16 TABLET ORAL
Status: DISCONTINUED | OUTPATIENT
Start: 2024-07-16 | End: 2024-07-21

## 2024-07-16 RX ORDER — ONDANSETRON HYDROCHLORIDE 2 MG/ML
4 INJECTION, SOLUTION INTRAVENOUS EVERY 6 HOURS PRN
Status: DISCONTINUED | OUTPATIENT
Start: 2024-07-16 | End: 2024-07-22

## 2024-07-16 RX ORDER — ACETAMINOPHEN 500 MG
1000 TABLET ORAL
Status: COMPLETED | OUTPATIENT
Start: 2024-07-16 | End: 2024-07-16

## 2024-07-16 RX ORDER — INSULIN DETEMIR 100 [IU]/ML
22 INJECTION, SOLUTION SUBCUTANEOUS NIGHTLY
Qty: 15 ML | Refills: 1 | Status: CANCELLED | OUTPATIENT
Start: 2024-07-16

## 2024-07-16 RX ORDER — IBUPROFEN 200 MG
24 TABLET ORAL
Status: DISCONTINUED | OUTPATIENT
Start: 2024-07-16 | End: 2024-07-17

## 2024-07-16 RX ORDER — METRONIDAZOLE 500 MG/100ML
500 INJECTION, SOLUTION INTRAVENOUS
Status: COMPLETED | OUTPATIENT
Start: 2024-07-16 | End: 2024-07-16

## 2024-07-16 RX ORDER — IODIXANOL 320 MG/ML
100 INJECTION, SOLUTION INTRAVASCULAR
Status: COMPLETED | OUTPATIENT
Start: 2024-07-16 | End: 2024-07-16

## 2024-07-16 RX ORDER — IBUPROFEN 200 MG
16 TABLET ORAL
Status: DISCONTINUED | OUTPATIENT
Start: 2024-07-16 | End: 2024-07-17

## 2024-07-16 RX ORDER — SODIUM CHLORIDE 0.9 % (FLUSH) 0.9 %
10 SYRINGE (ML) INJECTION EVERY 12 HOURS PRN
Status: DISCONTINUED | OUTPATIENT
Start: 2024-07-16 | End: 2024-07-21

## 2024-07-16 RX ORDER — GLUCAGON 1 MG
1 KIT INJECTION
Status: DISCONTINUED | OUTPATIENT
Start: 2024-07-16 | End: 2024-07-17

## 2024-07-16 RX ORDER — ONDANSETRON HYDROCHLORIDE 2 MG/ML
4 INJECTION, SOLUTION INTRAVENOUS
Status: DISCONTINUED | OUTPATIENT
Start: 2024-07-16 | End: 2024-07-16

## 2024-07-16 RX ORDER — ALUMINUM HYDROXIDE, MAGNESIUM HYDROXIDE, AND SIMETHICONE 1200; 120; 1200 MG/30ML; MG/30ML; MG/30ML
30 SUSPENSION ORAL 4 TIMES DAILY PRN
Status: DISCONTINUED | OUTPATIENT
Start: 2024-07-16 | End: 2024-07-23 | Stop reason: HOSPADM

## 2024-07-16 RX ORDER — NALOXONE HCL 0.4 MG/ML
0.02 VIAL (ML) INJECTION
Status: DISCONTINUED | OUTPATIENT
Start: 2024-07-16 | End: 2024-07-23 | Stop reason: HOSPADM

## 2024-07-16 RX ADMIN — LEVOFLOXACIN 750 MG: 750 INJECTION, SOLUTION INTRAVENOUS at 08:07

## 2024-07-16 RX ADMIN — IODIXANOL 100 ML: 320 INJECTION, SOLUTION INTRAVASCULAR at 06:07

## 2024-07-16 RX ADMIN — ACETAMINOPHEN 1000 MG: 500 TABLET ORAL at 04:07

## 2024-07-16 RX ADMIN — ONDANSETRON 4 MG: 2 INJECTION INTRAMUSCULAR; INTRAVENOUS at 04:07

## 2024-07-16 RX ADMIN — METRONIDAZOLE 500 MG: 5 INJECTION, SOLUTION INTRAVENOUS at 10:07

## 2024-07-16 RX ADMIN — MORPHINE SULFATE 1 MG: 2 INJECTION, SOLUTION INTRAMUSCULAR; INTRAVENOUS at 04:07

## 2024-07-16 RX ADMIN — MORPHINE SULFATE 1 MG: 2 INJECTION, SOLUTION INTRAMUSCULAR; INTRAVENOUS at 06:07

## 2024-07-17 ENCOUNTER — CLINICAL SUPPORT (OUTPATIENT)
Dept: CARDIOLOGY | Facility: HOSPITAL | Age: 34
End: 2024-07-17
Attending: HOSPITALIST
Payer: MEDICAID

## 2024-07-17 VITALS — BODY MASS INDEX: 26.31 KG/M2 | WEIGHT: 143 LBS | HEIGHT: 62 IN

## 2024-07-17 LAB
ALBUMIN SERPL BCP-MCNC: 3.3 G/DL (ref 3.5–5.2)
ALP SERPL-CCNC: 87 U/L (ref 55–135)
ALT SERPL W/O P-5'-P-CCNC: 49 U/L (ref 10–44)
ANION GAP SERPL CALC-SCNC: 14 MMOL/L (ref 8–16)
AORTIC ROOT ANNULUS: 3 CM
AORTIC VALVE CUSP SEPERATION: 2 CM
APICAL FOUR CHAMBER EJECTION FRACTION: 58 %
AST SERPL-CCNC: 41 U/L (ref 10–40)
AV INDEX (PROSTH): 0.71
AV MEAN GRADIENT: 5 MMHG
AV PEAK GRADIENT: 9 MMHG
AV VALVE AREA BY VELOCITY RATIO: 2.23 CM²
AV VALVE AREA: 2.24 CM²
AV VELOCITY RATIO: 0.71
BASOPHILS # BLD AUTO: 0 K/UL (ref 0–0.2)
BASOPHILS NFR BLD: 0 % (ref 0–1.9)
BILIRUB SERPL-MCNC: 1 MG/DL (ref 0.1–1)
BSA FOR ECHO PROCEDURE: 1.68 M2
BUN SERPL-MCNC: 57 MG/DL (ref 6–20)
CALCIUM SERPL-MCNC: 7.7 MG/DL (ref 8.7–10.5)
CHLORIDE SERPL-SCNC: 97 MMOL/L (ref 95–110)
CO2 SERPL-SCNC: 24 MMOL/L (ref 23–29)
CREAT SERPL-MCNC: 6.4 MG/DL (ref 0.5–1.4)
CV ECHO LV RWT: 0.68 CM
DIFFERENTIAL METHOD BLD: ABNORMAL
DOP CALC AO PEAK VEL: 1.52 M/S
DOP CALC AO VTI: 25.6 CM
DOP CALC LVOT AREA: 3.1 CM2
DOP CALC LVOT DIAMETER: 2 CM
DOP CALC LVOT PEAK VEL: 1.08 M/S
DOP CALC LVOT STROKE VOLUME: 57.46 CM3
DOP CALC MV VTI: 27.1 CM
DOP CALCLVOT PEAK VEL VTI: 18.3 CM
E WAVE DECELERATION TIME: 148 MSEC
E/A RATIO: 2.22
E/E' RATIO: 12.33 M/S
ECHO LV POSTERIOR WALL: 1.5 CM (ref 0.6–1.1)
EOSINOPHIL # BLD AUTO: 0 K/UL (ref 0–0.5)
EOSINOPHIL NFR BLD: 0.4 % (ref 0–8)
ERYTHROCYTE [DISTWIDTH] IN BLOOD BY AUTOMATED COUNT: 18.3 % (ref 11.5–14.5)
EST. GFR  (NO RACE VARIABLE): 8.1 ML/MIN/1.73 M^2
FRACTIONAL SHORTENING: 30 % (ref 28–44)
GLUCOSE SERPL-MCNC: 114 MG/DL (ref 70–110)
GLUCOSE SERPL-MCNC: 175 MG/DL (ref 70–110)
GLUCOSE SERPL-MCNC: 221 MG/DL (ref 70–110)
GLUCOSE SERPL-MCNC: 383 MG/DL (ref 70–110)
GLUCOSE SERPL-MCNC: 390 MG/DL (ref 70–110)
HCT VFR BLD AUTO: 26.9 % (ref 37–48.5)
HGB BLD-MCNC: 8.5 G/DL (ref 12–16)
IMM GRANULOCYTES # BLD AUTO: 0.03 K/UL (ref 0–0.04)
IMM GRANULOCYTES NFR BLD AUTO: 0.5 % (ref 0–0.5)
INTERVENTRICULAR SEPTUM: 1.5 CM (ref 0.6–1.1)
IVC DIAMETER: 1.8 CM
LEFT INTERNAL DIMENSION IN SYSTOLE: 3.1 CM (ref 2.1–4)
LEFT VENTRICLE DIASTOLIC VOLUME INDEX: 52.83 ML/M2
LEFT VENTRICLE DIASTOLIC VOLUME: 87.69 ML
LEFT VENTRICLE END DIASTOLIC VOLUME APICAL 4 CHAMBER: 99.1 ML
LEFT VENTRICLE MASS INDEX: 161 G/M2
LEFT VENTRICLE SYSTOLIC VOLUME INDEX: 22.8 ML/M2
LEFT VENTRICLE SYSTOLIC VOLUME: 37.92 ML
LEFT VENTRICULAR INTERNAL DIMENSION IN DIASTOLE: 4.4 CM (ref 3.5–6)
LEFT VENTRICULAR MASS: 266.87 G
LV LATERAL E/E' RATIO: 10.09 M/S
LV SEPTAL E/E' RATIO: 15.86 M/S
LVED V (TEICH): 87.69 ML
LVES V (TEICH): 37.92 ML
LVOT MG: 2 MMHG
LVOT MV: 0.69 CM/S
LYMPHOCYTES # BLD AUTO: 0.5 K/UL (ref 1–4.8)
LYMPHOCYTES NFR BLD: 8.4 % (ref 18–48)
MAGNESIUM SERPL-MCNC: 2.1 MG/DL (ref 1.6–2.6)
MCH RBC QN AUTO: 29.6 PG (ref 27–31)
MCHC RBC AUTO-ENTMCNC: 31.6 G/DL (ref 32–36)
MCV RBC AUTO: 94 FL (ref 82–98)
MONOCYTES # BLD AUTO: 0.3 K/UL (ref 0.3–1)
MONOCYTES NFR BLD: 5.2 % (ref 4–15)
MV MEAN GRADIENT: 3 MMHG
MV PEAK A VEL: 0.5 M/S
MV PEAK E VEL: 1.11 M/S
MV PEAK GRADIENT: 8 MMHG
MV STENOSIS PRESSURE HALF TIME: 64 MS
MV VALVE AREA BY CONTINUITY EQUATION: 2.12 CM2
MV VALVE AREA P 1/2 METHOD: 3.44 CM2
NEUTROPHILS # BLD AUTO: 4.8 K/UL (ref 1.8–7.7)
NEUTROPHILS NFR BLD: 85.5 % (ref 38–73)
NRBC BLD-RTO: 0 /100 WBC
OHS QRS DURATION: 78 MS
OHS QTC CALCULATION: 462 MS
PERICARDIUM POSTERIOR DIMENSION: 2.2 CM
PISA TR MAX VEL: 3.54 M/S
PLATELET # BLD AUTO: 66 K/UL (ref 150–450)
PMV BLD AUTO: 10.5 FL (ref 9.2–12.9)
POTASSIUM SERPL-SCNC: 4.4 MMOL/L (ref 3.5–5.1)
PROT SERPL-MCNC: 5.6 G/DL (ref 6–8.4)
PV MV: 0.81 M/S
PV PEAK GRADIENT: 6 MMHG
PV PEAK VELOCITY: 1.18 M/S
RBC # BLD AUTO: 2.87 M/UL (ref 4–5.4)
RV TISSUE DOPPLER FREE WALL SYSTOLIC VELOCITY 1 (APICAL 4 CHAMBER VIEW): 11.6 CM/S
SODIUM SERPL-SCNC: 135 MMOL/L (ref 136–145)
TDI LATERAL: 0.11 M/S
TDI SEPTAL: 0.07 M/S
TDI: 0.09 M/S
TR MAX PG: 50 MMHG
TR MEAN GRADIENT: 36 MMHG
TRICUSPID ANNULAR PLANE SYSTOLIC EXCURSION: 1.56 CM
TROPONIN I SERPL HS-MCNC: 62.3 PG/ML (ref 0–14.9)
TROPONIN I SERPL HS-MCNC: 72.8 PG/ML (ref 0–14.9)
WBC # BLD AUTO: 5.61 K/UL (ref 3.9–12.7)
Z-SCORE OF LEFT VENTRICULAR DIMENSION IN END DIASTOLE: -0.55
Z-SCORE OF LEFT VENTRICULAR DIMENSION IN END SYSTOLE: 0.58

## 2024-07-17 PROCEDURE — 93306 TTE W/DOPPLER COMPLETE: CPT

## 2024-07-17 PROCEDURE — 96372 THER/PROPH/DIAG INJ SC/IM: CPT | Performed by: HOSPITALIST

## 2024-07-17 PROCEDURE — 84484 ASSAY OF TROPONIN QUANT: CPT | Performed by: HOSPITALIST

## 2024-07-17 PROCEDURE — 85025 COMPLETE CBC W/AUTO DIFF WBC: CPT | Performed by: HOSPITALIST

## 2024-07-17 PROCEDURE — 80053 COMPREHEN METABOLIC PANEL: CPT | Performed by: HOSPITALIST

## 2024-07-17 PROCEDURE — G0378 HOSPITAL OBSERVATION PER HR: HCPCS

## 2024-07-17 PROCEDURE — 63600175 PHARM REV CODE 636 W HCPCS: Performed by: HOSPITALIST

## 2024-07-17 PROCEDURE — 99900035 HC TECH TIME PER 15 MIN (STAT)

## 2024-07-17 PROCEDURE — 93306 TTE W/DOPPLER COMPLETE: CPT | Mod: 26,,, | Performed by: GENERAL PRACTICE

## 2024-07-17 PROCEDURE — 25000003 PHARM REV CODE 250: Performed by: HOSPITALIST

## 2024-07-17 PROCEDURE — 36415 COLL VENOUS BLD VENIPUNCTURE: CPT | Performed by: HOSPITALIST

## 2024-07-17 PROCEDURE — 90935 HEMODIALYSIS ONE EVALUATION: CPT

## 2024-07-17 PROCEDURE — 83735 ASSAY OF MAGNESIUM: CPT | Performed by: HOSPITALIST

## 2024-07-17 PROCEDURE — 94799 UNLISTED PULMONARY SVC/PX: CPT

## 2024-07-17 PROCEDURE — 5A1D70Z PERFORMANCE OF URINARY FILTRATION, INTERMITTENT, LESS THAN 6 HOURS PER DAY: ICD-10-PCS | Performed by: HOSPITALIST

## 2024-07-17 RX ORDER — FLUOXETINE HYDROCHLORIDE 20 MG/1
20 CAPSULE ORAL DAILY
Status: DISCONTINUED | OUTPATIENT
Start: 2024-07-17 | End: 2024-07-23 | Stop reason: HOSPADM

## 2024-07-17 RX ORDER — INSULIN ASPART 100 [IU]/ML
0-5 INJECTION, SOLUTION INTRAVENOUS; SUBCUTANEOUS
Status: DISCONTINUED | OUTPATIENT
Start: 2024-07-17 | End: 2024-07-17

## 2024-07-17 RX ORDER — IBUPROFEN 200 MG
24 TABLET ORAL
Status: DISCONTINUED | OUTPATIENT
Start: 2024-07-17 | End: 2024-07-17

## 2024-07-17 RX ORDER — ALBUTEROL SULFATE 0.83 MG/ML
2.5 SOLUTION RESPIRATORY (INHALATION) EVERY 4 HOURS PRN
Status: DISCONTINUED | OUTPATIENT
Start: 2024-07-17 | End: 2024-07-23 | Stop reason: HOSPADM

## 2024-07-17 RX ORDER — GLUCAGON 1 MG
1 KIT INJECTION
Status: DISCONTINUED | OUTPATIENT
Start: 2024-07-17 | End: 2024-07-17

## 2024-07-17 RX ORDER — FLUTICASONE PROPIONATE 50 MCG
1 SPRAY, SUSPENSION (ML) NASAL DAILY
Status: DISCONTINUED | OUTPATIENT
Start: 2024-07-17 | End: 2024-07-23 | Stop reason: HOSPADM

## 2024-07-17 RX ORDER — ISOSORBIDE MONONITRATE 30 MG/1
30 TABLET, EXTENDED RELEASE ORAL DAILY
Status: DISCONTINUED | OUTPATIENT
Start: 2024-07-17 | End: 2024-07-20

## 2024-07-17 RX ORDER — SEVELAMER CARBONATE 800 MG/1
800 TABLET, FILM COATED ORAL
Status: DISCONTINUED | OUTPATIENT
Start: 2024-07-17 | End: 2024-07-23 | Stop reason: HOSPADM

## 2024-07-17 RX ORDER — CARVEDILOL 25 MG/1
25 TABLET ORAL 2 TIMES DAILY
Status: DISCONTINUED | OUTPATIENT
Start: 2024-07-17 | End: 2024-07-20

## 2024-07-17 RX ORDER — OXYCODONE AND ACETAMINOPHEN 10; 325 MG/1; MG/1
1 TABLET ORAL EVERY 4 HOURS PRN
Status: DISCONTINUED | OUTPATIENT
Start: 2024-07-17 | End: 2024-07-18

## 2024-07-17 RX ORDER — BUSPIRONE HYDROCHLORIDE 5 MG/1
10 TABLET ORAL 3 TIMES DAILY PRN
Status: DISCONTINUED | OUTPATIENT
Start: 2024-07-17 | End: 2024-07-17

## 2024-07-17 RX ORDER — PANTOPRAZOLE SODIUM 40 MG/1
40 TABLET, DELAYED RELEASE ORAL
Status: DISCONTINUED | OUTPATIENT
Start: 2024-07-17 | End: 2024-07-23 | Stop reason: HOSPADM

## 2024-07-17 RX ORDER — INSULIN ASPART 100 [IU]/ML
0-5 INJECTION, SOLUTION INTRAVENOUS; SUBCUTANEOUS
Status: DISCONTINUED | OUTPATIENT
Start: 2024-07-17 | End: 2024-07-21

## 2024-07-17 RX ORDER — LEVETIRACETAM 500 MG/1
500 TABLET ORAL 2 TIMES DAILY
Status: DISCONTINUED | OUTPATIENT
Start: 2024-07-17 | End: 2024-07-23 | Stop reason: HOSPADM

## 2024-07-17 RX ORDER — ALBUTEROL SULFATE 90 UG/1
2 AEROSOL, METERED RESPIRATORY (INHALATION) EVERY 4 HOURS PRN
Status: DISCONTINUED | OUTPATIENT
Start: 2024-07-17 | End: 2024-07-17

## 2024-07-17 RX ORDER — HYDRALAZINE HYDROCHLORIDE 25 MG/1
25 TABLET, FILM COATED ORAL EVERY 8 HOURS
Status: DISCONTINUED | OUTPATIENT
Start: 2024-07-17 | End: 2024-07-20

## 2024-07-17 RX ORDER — IBUPROFEN 200 MG
16 TABLET ORAL
Status: DISCONTINUED | OUTPATIENT
Start: 2024-07-17 | End: 2024-07-17

## 2024-07-17 RX ORDER — INSULIN GLARGINE 100 [IU]/ML
22 INJECTION, SOLUTION SUBCUTANEOUS DAILY
Status: DISCONTINUED | OUTPATIENT
Start: 2024-07-17 | End: 2024-07-18

## 2024-07-17 RX ORDER — AMLODIPINE BESYLATE 5 MG/1
5 TABLET ORAL DAILY
Status: DISCONTINUED | OUTPATIENT
Start: 2024-07-17 | End: 2024-07-20

## 2024-07-17 RX ORDER — GABAPENTIN 100 MG/1
200 CAPSULE ORAL 2 TIMES DAILY
Status: DISCONTINUED | OUTPATIENT
Start: 2024-07-17 | End: 2024-07-23 | Stop reason: HOSPADM

## 2024-07-17 RX ADMIN — FLUOXETINE 20 MG: 20 CAPSULE ORAL at 06:07

## 2024-07-17 RX ADMIN — GABAPENTIN 200 MG: 100 CAPSULE ORAL at 09:07

## 2024-07-17 RX ADMIN — APIXABAN 5 MG: 5 TABLET, FILM COATED ORAL at 09:07

## 2024-07-17 RX ADMIN — HYDROCODONE BITARTRATE AND ACETAMINOPHEN 1 TABLET: 5; 325 TABLET ORAL at 09:07

## 2024-07-17 RX ADMIN — LEVETIRACETAM 500 MG: 500 TABLET, FILM COATED ORAL at 09:07

## 2024-07-17 RX ADMIN — HYDRALAZINE HYDROCHLORIDE 25 MG: 25 TABLET ORAL at 09:07

## 2024-07-17 RX ADMIN — INSULIN GLARGINE 22 UNITS: 100 INJECTION, SOLUTION SUBCUTANEOUS at 06:07

## 2024-07-17 RX ADMIN — CARVEDILOL 25 MG: 25 TABLET, FILM COATED ORAL at 09:07

## 2024-07-17 RX ADMIN — INSULIN ASPART 2 UNITS: 100 INJECTION, SOLUTION INTRAVENOUS; SUBCUTANEOUS at 01:07

## 2024-07-17 RX ADMIN — INSULIN ASPART 5 UNITS: 100 INJECTION, SOLUTION INTRAVENOUS; SUBCUTANEOUS at 07:07

## 2024-07-17 RX ADMIN — Medication 6 MG: at 09:07

## 2024-07-17 RX ADMIN — OXYCODONE HYDROCHLORIDE AND ACETAMINOPHEN 1 TABLET: 10; 325 TABLET ORAL at 09:07

## 2024-07-17 NOTE — HOSPITAL COURSE
The patient was admitted and placed on prn pain meds. Her presentation was similar to past presentations of chronic abdominal pain. It was thought the findings on abdominal and pelvic CT were most consistent with fluid overload due to missed dialysis sessions. Nephrology  was consulted and dialysis orders were placed. Echo was ordered to evaluate pericardial effusion seen on CT, and it showed a moderate size pericardial effusion.  Cardiology consulted. Recommended to continue dialysis to remove fluid, and they felt this would be adequate to remove the pericardial effusion. Recommended repeating echo prior to discharge.  She underwent for treatment sessions over the time that she was admitted, and 3rd echocardiogram showed a much smaller effusion.  While doing all this, she went into diabetic ketoacidosis and had to be put in ICU for IV insulin.  That problem resolved fairly quickly.  She felt well and had no further need for hospital monitoring.  She was sent home in good condition.    Physical Exam  Constitutional:       General: She is not in acute distress.     Appearance: She appears comfortable  Eyes:      General:         Right eye: No discharge.         Left eye: No discharge.   Neck:      Vascular: No JVD.   Cardiovascular:      Rate and Rhythm: Normal rate and regular rhythm.   Pulmonary:      Effort: Pulmonary effort is normal.      Breath sounds: Normal breath sounds.

## 2024-07-17 NOTE — SUBJECTIVE & OBJECTIVE
Past Medical History:   Diagnosis Date    ESRD on hemodialysis 2022    Gastritis 2022    EGD was 22    Gastroparesis 2022    has not had Emptying study    Heart failure with preserved ejection fraction 2022    EF 55% on 3/22    History of supraventricular tachycardia     Hyperkalemia 2022    Hypertensive emergency 2022    Sickle cell trait 2022    Type 2 diabetes mellitus        Past Surgical History:   Procedure Laterality Date     SECTION      x 3    COLONOSCOPY      COLONOSCOPY N/A 2022    Procedure: COLONOSCOPY;  Surgeon: Jagdeep Cedeno MD;  Location: [facility];  Service: Endoscopy;  Laterality: N/A;    DESTRUCTION, CILIARY BODY, USING LASER Left 3/8/2024    Procedure: Cyclophotocoagulation G-probe, retrobulbar chlorpromazine left eye;  Surgeon: Fidel Wise MD;  Location: [address];  Service: Ophthalmology;  Laterality: Left;    ENDOSCOPIC ULTRASOUND OF UPPER GASTROINTESTINAL TRACT N/A 2023    Procedure: ULTRASOUND, UPPER GI TRACT, ENDOSCOPIC;  Surgeon: Micky Paredes III, MD;  Location: [facility];  Service: Endoscopy;  Laterality: N/A;    ESOPHAGOGASTRODUODENOSCOPY N/A 10/18/2019    Procedure: ESOPHAGOGASTRODUODENOSCOPY (EGD);  Surgeon: Gianluca Mendez MD;  Location: [facility];  Service: Endoscopy;  Laterality: N/A;    ESOPHAGOGASTRODUODENOSCOPY N/A 2022    Procedure: EGD (ESOPHAGOGASTRODUODENOSCOPY);  Surgeon: Micky Paredes III, MD;  Location: [facility];  Service: Endoscopy;  Laterality: N/A;    ESOPHAGOGASTRODUODENOSCOPY N/A 2022    Procedure: EGD (ESOPHAGOGASTRODUODENOSCOPY);  Surgeon: Marcelo Zhong MD;  Location: [facility];  Service: Endoscopy;  Laterality: N/A;    INSERTION, CATHETER, TUNNELED N/A 2023    Procedure: Insertion,catheter,tunneled;  Surgeon: Carlos Thurman Jr., MD;  Location: [facility];  Service: General;  Laterality: N/A;    LAPAROSCOPIC CHOLECYSTECTOMY N/A 2022    Procedure:  CHOLECYSTECTOMY, LAPAROSCOPIC;  Surgeon: Grey Perez MD;  Location: [facility] OR;  Service: General;  Laterality: N/A;    PLACEMENT OF DUAL-LUMEN VASCULAR CATHETER Left 07/12/2022    Procedure: INSERTION-CATHETER-JOSEPH;  Surgeon: Dionte Gan MD;  Location: [facility] OR;  Service: General;  Laterality: Left;    PLACEMENT OF DUAL-LUMEN VASCULAR CATHETER Right 07/26/2022    Procedure: INSERTION-CATHETER-Hemosplit;  Surgeon: Dionte Gan MD;  Location: [facility] OR;  Service: General;  Laterality: Right;       Review of patient's allergies indicates:   Allergen Reactions    Droperidol Hives    Penicillins Hives       No current facility-administered medications on file prior to encounter.     Current Outpatient Medications on File Prior to Encounter   Medication Sig    acetaZOLAMIDE (DIAMOX) 250 MG tablet take 1 tablet by mouth 3 times a day (Patient taking differently: Take 250 mg by mouth 3 (three) times daily.)    albuterol (VENTOLIN HFA) 90 mcg/actuation inhaler Inhale 2 puffs every 4 hours by inhalation route. (Patient taking differently: Inhale 2 puffs into the lungs every 4 (four) hours as needed for Shortness of Breath or Wheezing.)    amLODIPine (NORVASC) 5 MG tablet Take 1 tablet (5 mg total) by mouth once daily.    apixaban (ELIQUIS) 5 mg Tab Take 1 tablet (5 mg total) by mouth 2 (two) times daily. Patient w/ thrombocytopenia <50, so held during admission, follow-up with hematologist about restarting    atropine 1% (ISOPTO ATROPINE) 1 % Drop Place 1 drop into the left eye 2 (two) times a day.    blood sugar diagnostic (TRUE METRIX GLUCOSE TEST STRIP) Strp Use 1 strip to check blood glucose three times daily    blood-glucose meter (TRUE METRIX GLUCOSE METER) Misc Use to check blood glucose    blood-glucose meter,continuous (DEXCOM ) Misc USE WITH SENSORS    blood-glucose sensor Heather CHANGE EVERY 10 DAYS    brimonidine 0.15 % OPTH DROP (ALPHAGAN) 0.15 % ophthalmic solution Place 1 drop into both eyes 3  (three) times daily.    brinzolamide (AZOPT) 1 % ophthalmic suspension Place1 drop in left eye 3 times a day for 30 days (Patient taking differently: Place 1 drop into both eyes 3 (three) times daily.)    busPIRone (BUSPAR) 10 MG tablet Take 1 tablet (10 mg total) by mouth 3 (three) times daily as needed (anxiety).    carvediloL (COREG) 25 MG tablet take 1 tablet Oral 2 times daily (Patient taking differently: Take 25 mg by mouth 2 (two) times daily.)    cetirizine (ZYRTEC) 10 MG tablet Take 1 tablet every day by oral route. (Patient taking differently: Take 10 mg by mouth once daily.)    dorzolamide-timolol 2-0.5% (COSOPT) 22.3-6.8 mg/mL ophthalmic solution place 1 drop in left eye twice a day  for 30 days (Patient taking differently: Place 1 drop into both eyes 2 (two) times daily.)    FLUoxetine 20 MG capsule Take 1 capsule every day by oral route for 90 days. (Patient taking differently: Take 20 mg by mouth once daily.)    FLUoxetine 40 MG capsule Take 1 capsule every day by oral route. (Patient taking differently: Take 40 mg by mouth once daily.)    fluticasone propionate (FLONASE ALLERGY RELIEF) 50 mcg/actuation nasal spray [place] 1 spray every day by intranasal route. (Patient taking differently: 1 spray by Each Nostril route once daily.)    gabapentin (NEURONTIN) 300 MG capsule Take 2 capsules twice a day by oral route for 90 days. (Patient taking differently: Take 600 mg by mouth 2 (two) times daily.)    hydrALAZINE (APRESOLINE) 25 MG tablet take 1 tablet by mouth every 8 hours (Patient taking differently: Take 25 mg by mouth every 8 (eight) hours.)    insulin detemir U-100, Levemir, (LEVEMIR FLEXTOUCH U100 INSULIN) 100 unit/mL (3 mL) InPn pen Inject 22 Units into the skin every evening. Patient not needing insulin in-patient. Follow-up with PCP for hospital follow-up and restarting the medication. Or restart medication if glucose >200 consistently resume home insulin regimen. Or if glucose is persistently  "less than 100, decrease by 5 units until following up with PCP    insulin glargine U-100, Lantus, 100 unit/mL injection inject 13 unit subcutaneously 2 times daily (Patient taking differently: Inject 13 Units into the skin 2 (two) times a day.)    isosorbide mononitrate (IMDUR) 30 MG 24 hr tablet Take 1 tablet (30 mg total) by mouth once daily.    lancets (TRUEPLUS LANCETS) 33 gauge Misc Use 1 to check blood glucose three times daily    lancets 33 gauge Misc Use to test twice daily    levETIRAcetam (KEPPRA) 500 MG Tab Take 1 tablet (500 mg total) by mouth 2 (two) times daily.    LORazepam (ATIVAN) 0.5 MG tablet Take 1 tablet 3 times a day by oral route for 7 days, for severe panic. (Patient taking differently: Take 0.5 mg by mouth 3 (three) times daily.)    ondansetron (ZOFRAN-ODT) 4 MG TbDL Place 1 tablet (4 mg) on or under the tongue every 8 hours for 10 days. (Patient taking differently: Take 8 mg by mouth every 8 (eight) hours as needed.)    oxyCODONE-acetaminophen (PERCOCET)  mg per tablet Take 1 tablet by mouth every 4 (four) hours as needed for Pain.    pantoprazole (PROTONIX) 40 MG tablet Take 1 tablet (40 mg total) by mouth once daily.    pen needle, diabetic 31 gauge x 3/16" Ndle Use as directed with insulin once daily    prednisoLONE acetate (PRED FORTE) 1 % DrpS Place 1 drop into the left eye 2 (two) times daily.    predniSONE (DELTASONE) 20 MG tablet take 3 tablets Oral Daily,x30 day(s) (Patient taking differently: Take 20 mg by mouth once daily.)    sevelamer carbonate (RENVELA) 800 mg Tab Take 1 tablet (800 mg total) by mouth 3 (three) times daily with meals.    sucralfate (CARAFATE) 100 mg/mL suspension Take 10 mL 4 times a day by oral route before meals for 30 days. (Patient taking differently: Take 10 mLs by mouth 4 (four) times daily with meals and nightly.)    timolol maleate 0.5% (TIMOPTIC) 0.5 % Drop Place 1 drop in left eye 2 times a day for 30 days (Patient taking differently: Place 1 " drop into both eyes 2 (two) times daily.)    tobramycin-dexAMETHasone 0.3-0.1% (TOBRADEX) 0.3-0.1 % DrpS 1 drop Eye-Left every 6 hours for 5 day(s)    [DISCONTINUED] atenoloL (TENORMIN) 50 MG tablet Take 1 tablet (50 mg total) by mouth every other day.    [DISCONTINUED] dorzolamide-timolol 2-0.5% (COSOPT) 22.3-6.8 mg/mL ophthalmic solution Place 1 drop into the left eye every 12 (twelve) hours.    [DISCONTINUED] omeprazole (PRILOSEC) 20 MG capsule Take 2 capsules (40 mg total) by mouth once daily. for 10 days     Family History       Problem Relation (Age of Onset)    Diabetes Mother, Father          Tobacco Use    Smoking status: Never    Smokeless tobacco: Never   Substance and Sexual Activity    Alcohol use: Not Currently    Drug use: No    Sexual activity: Not Currently     Partners: Male     Birth control/protection: I.U.D.     Review of Systems   Constitutional:  Positive for diaphoresis.   HENT: Negative.     Eyes: Negative.    Respiratory: Negative.     Cardiovascular: Negative.    Gastrointestinal:  Positive for abdominal pain.   Musculoskeletal: Negative.    Skin: Negative.    Neurological: Negative.    All other systems reviewed and are negative.    Objective:     Vital Signs (Most Recent):  Temp: 99 °F (37.2 °C) (07/16/24 1810)  Pulse: 83 (07/16/24 1830)  Resp: 20 (07/16/24 1832)  BP: 108/62 (07/16/24 1830)  SpO2: 97 % (07/16/24 1830) Vital Signs (24h Range):  Temp:  [99 °F (37.2 °C)-99.4 °F (37.4 °C)] 99 °F (37.2 °C)  Pulse:  [83-85] 83  Resp:  [15-20] 20  SpO2:  [97 %-100 %] 97 %  BP: (108-140)/(62-82) 108/62     Weight: 53.1 kg (117 lb)  Body mass index is 21.4 kg/m².     Physical Exam  Constitutional:       Appearance: Normal appearance.   HENT:      Head: Normocephalic and atraumatic.      Nose: Nose normal.   Eyes:      Pupils: Pupils are equal, round, and reactive to light.   Cardiovascular:      Rate and Rhythm: Normal rate and regular rhythm.   Pulmonary:      Effort: Pulmonary effort is normal.    Abdominal:      General: There is distension.      Tenderness: There is abdominal tenderness.   Musculoskeletal:      Cervical back: Neck supple.   Skin:     General: Skin is warm.   Neurological:      General: No focal deficit present.      Mental Status: She is alert.   Psychiatric:         Behavior: Behavior normal.              CRANIAL NERVES     CN III, IV, VI   Pupils are equal, round, and reactive to light.       Significant Labs: All pertinent labs within the past 24 hours have been reviewed.  Recent Lab Results  (Last 5 results in the past 24 hours)        07/16/24  1824   07/16/24  1809   07/16/24  1618   07/16/24  1616   07/16/24  1610        Influenza A, Molecular       Negative         Influenza B, Molecular       Negative         Group A Strep, Molecular Negative  Comment: Arcanobacterium haemolyticum and Beta Streptococcus group C   and G will not be detected by this test method.  Please order   Throat Culture ([id number]) if suspected.                 Respiratory Infection Panel Source NP swab               Albumin         3.5       ALP         79       ALT         32       Anion Gap         11       AST         28       Baso #         0.01       Basophil %         0.1       BILIRUBIN TOTAL         1.2  Comment: For infants and newborns, interpretation of results should be based  on gestational age, weight and in agreement with clinical  observations.    Premature Infant recommended reference ranges:  Up to 24 hours.............<8.0 mg/dL  Up to 48 hours............<12.0 mg/dL  3-5 days..................<15.0 mg/dL  6-29 days.................<15.0 mg/dL         BNP         1,875  Comment: Values of less than 100 pg/ml are consistent with non-CHF populations.       BUN         52       Calcium         8.0       Chloride         100       CO2         27       CPK         101       Creatinine         6.0       Differential Method         Automated       eGFR         8.8       Eos #         0.0       Eos %          0.1       Flu A & B Source       Nasal swab         Glucose         183       Gran # (ANC)         7.8       Gran %         91.2       Beta HCG Quant         1.13  Comment: Quantitative HCG Reference Ranges:  Males........................<5.0 mIU/mL  Non-Pregnant Females.........<5.0 mIU/mL  Pregnancy:  Weeks post-LMP...............Range (mIU/mL)  1-10  weeks.....................202-231,000  11-15 weeks..................22,536-234,990  16-22 weeks...................8,007-50,064  23-40 weeks...................1,600-49,413    NOTE:  This assay is not FDA approved for tumor screening,   diagnosis, or monitoring.         Hematocrit         23.8       Hemoglobin         7.8       Immature Grans (Abs)         0.06  Comment: Mild elevation in immature granulocytes is non specific and   can be seen in a variety of conditions including stress response,   acute inflammation, trauma and pregnancy. Correlation with other   laboratory and clinical findings is essential.         Immature Granulocytes         0.7       INR         1.0  Comment: Coumadin Therapy:  2.0 - 3.0 for INR for all indicators except mechanical heart valves  and antiphospholipid syndromes which should use 2.5 - 3.5.         Lipase         17       Lymph #         0.2       Lymph %         2.2       Magnesium          2.1                2.1       MCH         29.9       MCHC         32.8       MCV         91       Mono #         0.5       Mono %         5.7       MPV         10.2       nRBC         0       Platelet Count         71       POC Lactate     1.07           Potassium         4.2       PROTEIN TOTAL         5.6       PT         10.9       RBC         2.61       RDW         18.5       Sample     VENOUS           SARS-CoV-2 RNA, Amplification, Qual       Negative  Comment: This test utilizes isothermal nucleic acid amplification technology   to   detect the SARS-CoV-2 RdRp nucleic acid segment. The analytical   sensitivity   (limit of  detection) is 500 copies/swab.     A POSITIVE result is indicative of the presence of SARS-CoV-2 RNA;   clinical   correlation with patient history and other diagnostic information is   necessary to determine patient infection status.    A NEGATIVE result means that SARS-CoV-2 nucleic acids are not present   above   the limit of detection. A NEGATIVE result should be treated as   presumptive.   It does not rule out the possibility of COVID-19 and should not be   the sole   basis for treatment decisions. If COVID-19 is strongly suspected   based on   clinical and exposure history, re-testing using an alternate   molecular assay   should be considered.     This test is FDA approved.Performance characteristics of this test   has been   independently verified by [facility] in conjunction   with   [facility] Department of Pathology and Laboratory   Medicine.           Sodium         138       Troponin I High Sensitivity   81.5  Comment: Troponin results differ between methods. Do not use   results between Troponin methods interchangeably.    Alkaline Phospatase levels above 400 U/L may   cause false positive results.    Access hsTnI should not be used for patients taking   Asfotase teresa (Strensiq).  Critical result [facility] 81.5 pg/mL called to and read back by Alice Killian RN  (ER) at 16-Jul-2024 19:38 by [facility].         86.7  Comment: Troponin results differ between methods. Do not use   results between Troponin methods interchangeably.    Alkaline Phospatase levels above 400 U/L may   cause false positive results.    Access hsTnI should not be used for patients taking   Asfotase teresa (Strensiq).  Critical result [facility] 86.7 pg/mL called to and read back by Ayleen Ji RN/ed at 16-Jul-2024 17:14 by [facility].  Result Rechecked         TSH         1.355       WBC         8.60                              Significant Imaging: I have reviewed all pertinent imaging results/findings  within the past 24 hours.

## 2024-07-17 NOTE — ASSESSMENT & PLAN NOTE
- It is difficult to determine if the patient has colitis or any inflammatory process in the abdomen due to persistent and chronic generalized pain.  Also, her edema noted in the CT scan could likely be due to volume overload and not completing her dialysis.  - Patient received a dose of antibiotics in the ER.  - Currently with holding further antibiotics  - Follow lab   Monitor

## 2024-07-17 NOTE — PLAN OF CARE
Problem: Adult Inpatient Plan of Care  Goal: Plan of Care Review  Outcome: Progressing  Goal: Patient-Specific Goal (Individualized)  Outcome: Progressing  Goal: Absence of Hospital-Acquired Illness or Injury  Outcome: Progressing  Goal: Optimal Comfort and Wellbeing  Outcome: Progressing  Goal: Readiness for Transition of Care  Outcome: Progressing     Problem: Infection  Goal: Absence of Infection Signs and Symptoms  Outcome: Progressing     Problem: Diabetes Comorbidity  Goal: Blood Glucose Level Within Targeted Range  Outcome: Progressing     Problem: Skin Injury Risk Increased  Goal: Skin Health and Integrity  Outcome: Progressing     Problem: Hemodialysis  Goal: Safe, Effective Therapy Delivery  Outcome: Progressing  Goal: Effective Tissue Perfusion  Outcome: Progressing  Goal: Absence of Infection Signs and Symptoms  Outcome: Progressing

## 2024-07-17 NOTE — H&P
[facility] - Emergency Dept  Hospital Medicine  History & Physical    Patient Name: Tabby Howard  MRN: [id number]  Patient Class: OP- Observation  Admission Date: 7/16/2024  Attending Physician: Yamilka Mercado MD   Primary Care Provider: Melony Kim NP         Patient information was obtained from patient and ER records.     Subjective:     Principal Problem:Abdominal pain    Chief Complaint:   Chief Complaint   Patient presents with    Abdominal Pain     Patient was at dialysis and complained of lower back pain, did not finish  only took 3 kg at dialysis.  Has missed about 8 appointments in the last 6 weeks of dialysis. CBG was 58, D10 given.  SpO2 did drop from 97 to 79% on room.  Patient is on 3 liters.          HPI: This is a 34-year-old female with end-stage kidney disease and on dialysis presented to the hospital after not feeling well during dialysis.  In the middle of dialysis the patient had abdominal discomfort and complained of diarrhea.  Dialysis was stopped and the patient was sent to the hospital.  Workup included CT of the abdomen and pelvis which resulted in diffuse body wall edema, mild circumferential wall thickening of the distal descending colon, sigmoid colon and rectum.  There was also circumferential bladder wall thickening.  This process could correlate with infectious/inflammatory process.  Patient was given antibiotics empirically in the ER.  Patient has chronic pain and complaints of abdominal pain.  No major fever or chills.  Nephrology was informed and they would like the patient to be admitted so that she complete her dialysis tomorrow.    Past Medical History:   Diagnosis Date    ESRD on hemodialysis 04/12/2022    Gastritis 12/2022    EGD was 12/5/22    Gastroparesis 04/12/2022    has not had Emptying study    Heart failure with preserved ejection fraction 04/12/2022    EF 55% on 3/22    History of supraventricular tachycardia     Hyperkalemia 07/07/2022     Hypertensive emergency 2022    Sickle cell trait 2022    Type 2 diabetes mellitus        Past Surgical History:   Procedure Laterality Date     SECTION      x 3    COLONOSCOPY      COLONOSCOPY N/A 2022    Procedure: COLONOSCOPY;  Surgeon: Jagdeep Cedeno MD;  Location: [facility];  Service: Endoscopy;  Laterality: N/A;    DESTRUCTION, CILIARY BODY, USING LASER Left 3/8/2024    Procedure: Cyclophotocoagulation G-probe, retrobulbar chlorpromazine left eye;  Surgeon: Fidel Wise MD;  Location: [address];  Service: Ophthalmology;  Laterality: Left;    ENDOSCOPIC ULTRASOUND OF UPPER GASTROINTESTINAL TRACT N/A 2023    Procedure: ULTRASOUND, UPPER GI TRACT, ENDOSCOPIC;  Surgeon: Micky Paredes III, MD;  Location: [facility];  Service: Endoscopy;  Laterality: N/A;    ESOPHAGOGASTRODUODENOSCOPY N/A 10/18/2019    Procedure: ESOPHAGOGASTRODUODENOSCOPY (EGD);  Surgeon: Gianluca Mendez MD;  Location: [facility];  Service: Endoscopy;  Laterality: N/A;    ESOPHAGOGASTRODUODENOSCOPY N/A 2022    Procedure: EGD (ESOPHAGOGASTRODUODENOSCOPY);  Surgeon: Micky Paredes III, MD;  Location: [facility];  Service: Endoscopy;  Laterality: N/A;    ESOPHAGOGASTRODUODENOSCOPY N/A 2022    Procedure: EGD (ESOPHAGOGASTRODUODENOSCOPY);  Surgeon: Marcelo Zhong MD;  Location: [facility];  Service: Endoscopy;  Laterality: N/A;    INSERTION, CATHETER, TUNNELED N/A 2023    Procedure: Insertion,catheter,tunneled;  Surgeon: Carlos Thurman Jr., MD;  Location: [facility] OR;  Service: General;  Laterality: N/A;    LAPAROSCOPIC CHOLECYSTECTOMY N/A 2022    Procedure: CHOLECYSTECTOMY, LAPAROSCOPIC;  Surgeon: Grey Perez MD;  Location: [facility] OR;  Service: General;  Laterality: N/A;    PLACEMENT OF DUAL-LUMEN VASCULAR CATHETER Left 2022    Procedure: INSERTION-CATHETER-JOSEPH;  Surgeon: Dionte Gan MD;  Location: [facility] OR;  Service: General;  Laterality: Left;    PLACEMENT OF  DUAL-LUMEN VASCULAR CATHETER Right 07/26/2022    Procedure: INSERTION-CATHETER-Hemosplit;  Surgeon: Dionte Gan MD;  Location: [facility];  Service: General;  Laterality: Right;       Review of patient's allergies indicates:   Allergen Reactions    Droperidol Hives    Penicillins Hives       No current facility-administered medications on file prior to encounter.     Current Outpatient Medications on File Prior to Encounter   Medication Sig    acetaZOLAMIDE (DIAMOX) 250 MG tablet take 1 tablet by mouth 3 times a day (Patient taking differently: Take 250 mg by mouth 3 (three) times daily.)    albuterol (VENTOLIN HFA) 90 mcg/actuation inhaler Inhale 2 puffs every 4 hours by inhalation route. (Patient taking differently: Inhale 2 puffs into the lungs every 4 (four) hours as needed for Shortness of Breath or Wheezing.)    amLODIPine (NORVASC) 5 MG tablet Take 1 tablet (5 mg total) by mouth once daily.    apixaban (ELIQUIS) 5 mg Tab Take 1 tablet (5 mg total) by mouth 2 (two) times daily. Patient w/ thrombocytopenia <50, so held during admission, follow-up with hematologist about restarting    atropine 1% (ISOPTO ATROPINE) 1 % Drop Place 1 drop into the left eye 2 (two) times a day.    blood sugar diagnostic (TRUE METRIX GLUCOSE TEST STRIP) Strp Use 1 strip to check blood glucose three times daily    blood-glucose meter (TRUE METRIX GLUCOSE METER) Misc Use to check blood glucose    blood-glucose meter,continuous (DEXCOM ) Misc USE WITH SENSORS    blood-glucose sensor Heather CHANGE EVERY 10 DAYS    brimonidine 0.15 % OPTH DROP (ALPHAGAN) 0.15 % ophthalmic solution Place 1 drop into both eyes 3 (three) times daily.    brinzolamide (AZOPT) 1 % ophthalmic suspension Place1 drop in left eye 3 times a day for 30 days (Patient taking differently: Place 1 drop into both eyes 3 (three) times daily.)    busPIRone (BUSPAR) 10 MG tablet Take 1 tablet (10 mg total) by mouth 3 (three) times daily as needed (anxiety).     carvediloL (COREG) 25 MG tablet take 1 tablet Oral 2 times daily (Patient taking differently: Take 25 mg by mouth 2 (two) times daily.)    cetirizine (ZYRTEC) 10 MG tablet Take 1 tablet every day by oral route. (Patient taking differently: Take 10 mg by mouth once daily.)    dorzolamide-timolol 2-0.5% (COSOPT) 22.3-6.8 mg/mL ophthalmic solution place 1 drop in left eye twice a day  for 30 days (Patient taking differently: Place 1 drop into both eyes 2 (two) times daily.)    FLUoxetine 20 MG capsule Take 1 capsule every day by oral route for 90 days. (Patient taking differently: Take 20 mg by mouth once daily.)    FLUoxetine 40 MG capsule Take 1 capsule every day by oral route. (Patient taking differently: Take 40 mg by mouth once daily.)    fluticasone propionate (FLONASE ALLERGY RELIEF) 50 mcg/actuation nasal spray Peytona 1 spray every day by intranasal route. (Patient taking differently: 1 spray by Each Nostril route once daily.)    gabapentin (NEURONTIN) 300 MG capsule Take 2 capsules twice a day by oral route for 90 days. (Patient taking differently: Take 600 mg by mouth 2 (two) times daily.)    hydrALAZINE (APRESOLINE) 25 MG tablet take 1 tablet by mouth every 8 hours (Patient taking differently: Take 25 mg by mouth every 8 (eight) hours.)    insulin detemir U-100, Levemir, (LEVEMIR FLEXTOUCH U100 INSULIN) 100 unit/mL (3 mL) InPn pen Inject 22 Units into the skin every evening. Patient not needing insulin in-patient. Follow-up with PCP for hospital follow-up and restarting the medication. Or restart medication if glucose >200 consistently resume home insulin regimen. Or if glucose is persistently less than 100, decrease by 5 units until following up with PCP    insulin glargine U-100, Lantus, 100 unit/mL injection inject 13 unit subcutaneously 2 times daily (Patient taking differently: Inject 13 Units into the skin 2 (two) times a day.)    isosorbide mononitrate (IMDUR) 30 MG 24 hr tablet Take 1 tablet (30 mg  "total) by mouth once daily.    lancets (TRUEPLUS LANCETS) 33 gauge Misc Use 1 to check blood glucose three times daily    lancets 33 gauge Misc Use to test twice daily    levETIRAcetam (KEPPRA) 500 MG Tab Take 1 tablet (500 mg total) by mouth 2 (two) times daily.    LORazepam (ATIVAN) 0.5 MG tablet Take 1 tablet 3 times a day by oral route for 7 days, for severe panic. (Patient taking differently: Take 0.5 mg by mouth 3 (three) times daily.)    ondansetron (ZOFRAN-ODT) 4 MG TbDL Place 1 tablet (4 mg) on or under the tongue every 8 hours for 10 days. (Patient taking differently: Take 8 mg by mouth every 8 (eight) hours as needed.)    oxyCODONE-acetaminophen (PERCOCET)  mg per tablet Take 1 tablet by mouth every 4 (four) hours as needed for Pain.    pantoprazole (PROTONIX) 40 MG tablet Take 1 tablet (40 mg total) by mouth once daily.    pen needle, diabetic 31 gauge x 3/16" Ndle Use as directed with insulin once daily    prednisoLONE acetate (PRED FORTE) 1 % DrpS Place 1 drop into the left eye 2 (two) times daily.    predniSONE (DELTASONE) 20 MG tablet take 3 tablets Oral Daily,x30 day(s) (Patient taking differently: Take 20 mg by mouth once daily.)    sevelamer carbonate (RENVELA) 800 mg Tab Take 1 tablet (800 mg total) by mouth 3 (three) times daily with meals.    sucralfate (CARAFATE) 100 mg/mL suspension Take 10 mL 4 times a day by oral route before meals for 30 days. (Patient taking differently: Take 10 mLs by mouth 4 (four) times daily with meals and nightly.)    timolol maleate 0.5% (TIMOPTIC) 0.5 % Drop Place 1 drop in left eye 2 times a day for 30 days (Patient taking differently: Place 1 drop into both eyes 2 (two) times daily.)    tobramycin-dexAMETHasone 0.3-0.1% (TOBRADEX) 0.3-0.1 % DrpS 1 drop Eye-Left every 6 hours for 5 day(s)    [DISCONTINUED] atenoloL (TENORMIN) 50 MG tablet Take 1 tablet (50 mg total) by mouth every other day.    [DISCONTINUED] dorzolamide-timolol 2-0.5% (COSOPT) 22.3-6.8 " mg/mL ophthalmic solution Place 1 drop into the left eye every 12 (twelve) hours.    [DISCONTINUED] omeprazole (PRILOSEC) 20 MG capsule Take 2 capsules (40 mg total) by mouth once daily. for 10 days     Family History       Problem Relation (Age of Onset)    Diabetes Mother, Father          Tobacco Use    Smoking status: Never    Smokeless tobacco: Never   Substance and Sexual Activity    Alcohol use: Not Currently    Drug use: No    Sexual activity: Not Currently     Partners: Male     Birth control/protection: I.U.D.     Review of Systems   Constitutional:  Positive for diaphoresis.   HENT: Negative.     Eyes: Negative.    Respiratory: Negative.     Cardiovascular: Negative.    Gastrointestinal:  Positive for abdominal pain.   Musculoskeletal: Negative.    Skin: Negative.    Neurological: Negative.    All other systems reviewed and are negative.    Objective:     Vital Signs (Most Recent):  Temp: 99 °F (37.2 °C) (07/16/24 1810)  Pulse: 83 (07/16/24 1830)  Resp: 20 (07/16/24 1832)  BP: 108/62 (07/16/24 1830)  SpO2: 97 % (07/16/24 1830) Vital Signs (24h Range):  Temp:  [99 °F (37.2 °C)-99.4 °F (37.4 °C)] 99 °F (37.2 °C)  Pulse:  [83-85] 83  Resp:  [15-20] 20  SpO2:  [97 %-100 %] 97 %  BP: (108-140)/(62-82) 108/62     Weight: 53.1 kg (117 lb)  Body mass index is 21.4 kg/m².     Physical Exam  Constitutional:       Appearance: Normal appearance.   HENT:      Head: Normocephalic and atraumatic.      Nose: Nose normal.   Eyes:      Pupils: Pupils are equal, round, and reactive to light.   Cardiovascular:      Rate and Rhythm: Normal rate and regular rhythm.   Pulmonary:      Effort: Pulmonary effort is normal.   Abdominal:      General: There is distension.      Tenderness: There is abdominal tenderness.   Musculoskeletal:      Cervical back: Neck supple.   Skin:     General: Skin is warm.   Neurological:      General: No focal deficit present.      Mental Status: She is alert.   Psychiatric:         Behavior: Behavior  normal.              CRANIAL NERVES     CN III, IV, VI   Pupils are equal, round, and reactive to light.       Significant Labs: All pertinent labs within the past 24 hours have been reviewed.  Recent Lab Results  (Last 5 results in the past 24 hours)        07/16/24  1824   07/16/24  1809   07/16/24  1618   07/16/24  1616   07/16/24  1610        Influenza A, Molecular       Negative         Influenza B, Molecular       Negative         Group A Strep, Molecular Negative  Comment: Arcanobacterium haemolyticum and Beta Streptococcus group C   and G will not be detected by this test method.  Please order   Throat Culture ([id number]) if suspected.                 Respiratory Infection Panel Source NP swab               Albumin         3.5       ALP         79       ALT         32       Anion Gap         11       AST         28       Baso #         0.01       Basophil %         0.1       BILIRUBIN TOTAL         1.2  Comment: For infants and newborns, interpretation of results should be based  on gestational age, weight and in agreement with clinical  observations.    Premature Infant recommended reference ranges:  Up to 24 hours.............<8.0 mg/dL  Up to 48 hours............<12.0 mg/dL  3-5 days..................<15.0 mg/dL  6-29 days.................<15.0 mg/dL         BNP         1,875  Comment: Values of less than 100 pg/ml are consistent with non-CHF populations.       BUN         52       Calcium         8.0       Chloride         100       CO2         27       CPK         101       Creatinine         6.0       Differential Method         Automated       eGFR         8.8       Eos #         0.0       Eos %         0.1       Flu A & B Source       Nasal swab         Glucose         183       Gran # (ANC)         7.8       Gran %         91.2       Beta HCG Quant         1.13  Comment: Quantitative HCG Reference Ranges:  Males........................<5.0 mIU/mL  Non-Pregnant Females.........<5.0  mIU/mL  Pregnancy:  Weeks post-LMP...............Range (mIU/mL)  1-10  weeks.....................202-231,000  11-15 weeks..................22,536-234,990  16-22 weeks...................8,007-50,064  23-40 weeks...................1,600-49,413    NOTE:  This assay is not FDA approved for tumor screening,   diagnosis, or monitoring.         Hematocrit         23.8       Hemoglobin         7.8       Immature Grans (Abs)         0.06  Comment: Mild elevation in immature granulocytes is non specific and   can be seen in a variety of conditions including stress response,   acute inflammation, trauma and pregnancy. Correlation with other   laboratory and clinical findings is essential.         Immature Granulocytes         0.7       INR         1.0  Comment: Coumadin Therapy:  2.0 - 3.0 for INR for all indicators except mechanical heart valves  and antiphospholipid syndromes which should use 2.5 - 3.5.         Lipase         17       Lymph #         0.2       Lymph %         2.2       Magnesium          2.1                2.1       MCH         29.9       MCHC         32.8       MCV         91       Mono #         0.5       Mono %         5.7       MPV         10.2       nRBC         0       Platelet Count         71       POC Lactate     1.07           Potassium         4.2       PROTEIN TOTAL         5.6       PT         10.9       RBC         2.61       RDW         18.5       Sample     VENOUS           SARS-CoV-2 RNA, Amplification, Qual       Negative  Comment: This test utilizes isothermal nucleic acid amplification technology   to   detect the SARS-CoV-2 RdRp nucleic acid segment. The analytical   sensitivity   (limit of detection) is 500 copies/swab.     A POSITIVE result is indicative of the presence of SARS-CoV-2 RNA;   clinical   correlation with patient history and other diagnostic information is   necessary to determine patient infection status.    A NEGATIVE result means that SARS-CoV-2 nucleic acids are not  present   above   the limit of detection. A NEGATIVE result should be treated as   presumptive.   It does not rule out the possibility of COVID-19 and should not be   the sole   basis for treatment decisions. If COVID-19 is strongly suspected   based on   clinical and exposure history, re-testing using an alternate   molecular assay   should be considered.     This test is FDA approved.Performance characteristics of this test   has been   independently verified by [facility] in conjunction   with   [facility] Department of Pathology and Laboratory   Medicine.           Sodium         138       Troponin I High Sensitivity   81.5  Comment: Troponin results differ between methods. Do not use   results between Troponin methods interchangeably.    Alkaline Phospatase levels above 400 U/L may   cause false positive results.    Access hsTnI should not be used for patients taking   Asfotase teresa (Strensiq).  Critical result TNIHS 81.5 pg/mL called to and read back by Alice Killian RN  (ER) at 16-Jul-2024 19:38 by [facility].         86.7  Comment: Troponin results differ between methods. Do not use   results between Troponin methods interchangeably.    Alkaline Phospatase levels above 400 U/L may   cause false positive results.    Access hsTnI should not be used for patients taking   Asfotase teresa (Strensiq).  Critical result TNIHS 86.7 pg/mL called to and read back by Ayleen Ji RN/ed at 16-Jul-2024 17:14 by [facility].  Result Rechecked         TSH         1.355       WBC         8.60                              Significant Imaging: I have reviewed all pertinent imaging results/findings within the past 24 hours.  Assessment/Plan:     * Abdominal pain  - It is difficult to determine if the patient has colitis or any inflammatory process in the abdomen due to persistent and chronic generalized pain.  Also, her edema noted in the CT scan could likely be due to volume overload and not  completing her dialysis.  - Patient already received a dose of antibiotics in the ER.  - Currently with holding further antibiotics  - Follow lab   - Re-evaluate in the morning    Seizures    Continue Keppra    MDD (major depressive disorder)  Fluoxetine 20 mg daily    Hypertension  Amlodipine 5 mg daily   Carvedilol 25 mg p.o. b.i.d.   Hydralazine 25 mg t.i.d.  isosorbide mononitrate 30 mg daily    Deep vein thrombosis (DVT) of non-extremity vein  Continue apixaban      ESRD (end stage renal disease)  Nephrology consulted   Dialysis in the morning  Sevelamer    Pericardial effusion  CT of the chest showed moderate to severe pericardial effusion.  Typically CT scan over reads on these kind of stuff but I will order an echocardiogram.      Diabetes  Lantus 22 units daily   Insulin sliding scale      VTE Risk Mitigation (From admission, onward)           Ordered     Reason for No Pharmacological VTE Prophylaxis  Once        Question:  Reasons:  Answer:  Already adequately anticoagulated on oral Anticoagulants    07/16/24 2030     IP VTE HIGH RISK PATIENT  Once         07/16/24 2030     Place sequential compression device  Until discontinued         07/16/24 2030                       On 07/16/2024, patient should be placed in hospital observation services under my care.             Yamilka Mercado MD  Department of Hospital Medicine  [facility] - Emergency Dept

## 2024-07-17 NOTE — ASSESSMENT & PLAN NOTE
Amlodipine 5 mg daily   Carvedilol 25 mg p.o. b.i.d.   Hydralazine 25 mg t.i.d.  isosorbide mononitrate 30 mg daily

## 2024-07-17 NOTE — ASSESSMENT & PLAN NOTE
CT of the chest showed moderate to severe pericardial effusion.  Typically CT scan over reads on these kind of stuff but I will order an echocardiogram.

## 2024-07-17 NOTE — SUBJECTIVE & OBJECTIVE
Interval History: c/o abdominal pain    Review of Systems   Constitutional:  Negative for activity change, appetite change, chills and fever.   HENT:  Negative for congestion, ear pain, nosebleeds and sinus pain.    Eyes:  Negative for discharge and itching.   Respiratory:  Negative for apnea, cough, chest tightness and shortness of breath.    Cardiovascular:  Negative for chest pain, palpitations and leg swelling.   Gastrointestinal:  Positive for abdominal distention and abdominal pain. Negative for vomiting.   Genitourinary:  Negative for difficulty urinating, dysuria, flank pain and frequency.   Musculoskeletal:  Negative for arthralgias, back pain, joint swelling and myalgias.   Skin:  Negative for color change, pallor and rash.   Neurological:  Negative for dizziness, weakness, light-headedness and headaches.   Psychiatric/Behavioral:  Negative for agitation, behavioral problems, confusion and suicidal ideas.      Objective:     Vital Signs (Most Recent):  Temp: 97.8 °F (36.6 °C) (07/17/24 1132)  Pulse: 85 (07/17/24 1132)  Resp: 18 (07/17/24 1132)  BP: 124/74 (07/17/24 1132)  SpO2: (!) 90 % (07/17/24 1132) Vital Signs (24h Range):  Temp:  [97.3 °F (36.3 °C)-99.4 °F (37.4 °C)] 97.8 °F (36.6 °C)  Pulse:  [70-89] 85  Resp:  [15-20] 18  SpO2:  [90 %-100 %] 90 %  BP: (108-157)/(62-88) 124/74     Weight: 65 kg (143 lb 4.8 oz)  Body mass index is 26.21 kg/m².    Intake/Output Summary (Last 24 hours) at 7/17/2024 1237  Last data filed at 7/17/2024 0913  Gross per 24 hour   Intake 470 ml   Output 0 ml   Net 470 ml         Physical Exam  Vitals reviewed.   Constitutional:       General: She is not in acute distress.     Appearance: Normal appearance. She is normal weight. She is not ill-appearing.   HENT:      Head: Normocephalic and atraumatic.      Nose: Nose normal.      Mouth/Throat:      Mouth: Mucous membranes are moist.      Pharynx: Oropharynx is clear.   Eyes:      General: No scleral icterus.     Extraocular  Movements: Extraocular movements intact.      Conjunctiva/sclera: Conjunctivae normal.      Pupils: Pupils are equal, round, and reactive to light.   Cardiovascular:      Rate and Rhythm: Normal rate and regular rhythm.      Pulses: Normal pulses.      Heart sounds: Normal heart sounds. No murmur heard.     No gallop.   Pulmonary:      Effort: Pulmonary effort is normal. No respiratory distress.      Breath sounds: Normal breath sounds. No wheezing, rhonchi or rales.   Chest:      Chest wall: No tenderness.   Abdominal:      General: Abdomen is flat. There is distension.      Palpations: Abdomen is soft.      Tenderness: There is abdominal tenderness.   Musculoskeletal:         General: No swelling or tenderness.      Cervical back: Normal range of motion and neck supple. No rigidity or tenderness.      Right lower leg: No edema.      Left lower leg: No edema.   Skin:     General: Skin is warm and dry.   Neurological:      General: No focal deficit present.      Mental Status: She is alert and oriented to person, place, and time. Mental status is at baseline.      Motor: No weakness.   Psychiatric:         Mood and Affect: Mood normal.         Behavior: Behavior normal.         Thought Content: Thought content normal.             Significant Labs: All pertinent labs within the past 24 hours have been reviewed.    Significant Imaging: I have reviewed all pertinent imaging results/findings within the past 24 hours.

## 2024-07-17 NOTE — PROGRESS NOTES
UF tx tolerated well    Net UF 4 L       07/17/24 1715   Handoff Report   Received From Stephanie Mena   Given To Palmer   Vital Signs   Temp 98.4 °F (36.9 °C)   Temp Source Oral   Pulse 79   Resp 16   SpO2 95 %   Pulse Oximetry Type Intermittent   Device (Oxygen Therapy) room air   /75   BP Location Right arm   BP Method Automatic   Patient Position Lying   Assessments (Pre/Post)   Blood Liters Processed (BLP) 0   Transport Modality bed   Level of Consciousness (AVPU) alert   Dialyzer Clearance moderately streaked        Hemodialysis AV Fistula Left upper arm   No placement date or time found.   Location: Left upper arm   Site Assessment Clean;Dry;Intact   Patency Present;Thrill;Bruit   Status Deaccessed   Flows Good   Dressing Intervention First dressing   Dressing Status Clean;Dry;Intact   Site Condition No complications   Dressing Gauze   Post-Hemodialysis Assessment   Rinseback Volume (mL) 250 mL   Blood Volume Processed (Liters) 0 L   Dialyzer Clearance Moderately streaked   Duration of Treatment 180 minutes   Additional Fluid Intake (mL) 500 mL   Total UF (mL) 4500 mL   Net Fluid Removal 4000   Post-Treatment Weight 65.4 kg (144 lb 2.9 oz)   Treatment Weight Change -4   Arterial bleeding stop time (min) 6 min   Venous bleeding stop time (min) 6 min   Post-Hemodialysis Comments Tx complete, pt stable

## 2024-07-17 NOTE — ASSESSMENT & PLAN NOTE
- It is difficult to determine if the patient has colitis or any inflammatory process in the abdomen due to persistent and chronic generalized pain.  Also, her edema noted in the CT scan could likely be due to volume overload and not completing her dialysis.  - Patient already received a dose of antibiotics in the ER.  - Currently with holding further antibiotics  - Follow lab   - Re-evaluate in the morning

## 2024-07-17 NOTE — CARE UPDATE
07/17/24 0852   Patient Assessment/Suction   Level of Consciousness (AVPU) alert   Respiratory Effort Normal;Unlabored   PRE-TX-O2   Device (Oxygen Therapy) room air   SpO2 97 %   Pulse 70   Resp 18

## 2024-07-17 NOTE — PLAN OF CARE
[facility]  Initial Discharge Assessment       Primary Care Provider: Melony Kim NP    Admission Diagnosis: Abdominal pain, unspecified abdominal location [R10.9]    Admission Date: 7/16/2024  Expected Discharge Date: 7/18/2024   completed discharge assessment via chart review Pt was just admitted to hospital 3 weeks ago . Pt AAOx4s. Demographics, PCP, and insurance verified. No home health. Pt has  dialysis on TTHS with [facility]. Pt reports ability to complete ADLs without assistance. Pt verbalized plan to discharge home via family transport. Pt has no other needs to be addressed at this time.    Transition of Care Barriers: None    Payor: MEDICAID / Plan: [place] / Product Type: Managed Medicaid /     Extended Emergency Contact Information  Primary Emergency Contact: Tanya Howard  Address: [address] [place], [place] [id number] [facility]  Home Phone: [phone]  Mobile Phone: [phone]  Relation: Step parent  Preferred language: English   needed? No  Secondary Emergency Contact: Garcia Howard  Address: [address] [place] [address]  Mobile Phone: [phone]  Relation: Father  Preferred language: English   needed? No    Discharge Plan A: Home with family  Discharge Plan B: Home with family      [facility] [address]  [facility] [address]  Phone: [phone] Fax: [phone]      Initial Assessment (most recent)       Adult Discharge Assessment - 07/17/24 1045          Discharge Assessment    Assessment Type Discharge Planning Assessment     Confirmed/corrected address, phone number and insurance Yes     Confirmed Demographics Correct on Facesheet     Source of Information health record     Reason For Admission Abdominal pain     People in Home child(tammy), dependent     Do you expect to return to your current living situation? Yes     Do you have help at home or someone  to help you manage your care at home? Yes     Who are your caregiver(s) and their phone number(s)? Tanya Howard (Step parent)  [phone]     Walking or Climbing Stairs Difficulty no     Dressing/Bathing Difficulty no     Home Accessibility wheelchair accessible     Home Layout Able to live on 1st floor     Equipment Currently Used at Home none     Readmission within 30 days? Yes     Patient currently being followed by outpatient case management? No     Do you currently have service(s) that help you manage your care at home? No     Do you take prescription medications? Yes     Do you have prescription coverage? Yes     Coverage Payor: MEDICAID - [id number] [place] [place] -     Do you have any problems affording any of your prescribed medications? No     Is the patient taking medications as prescribed? yes     Who is going to help you get home at discharge? Tanya Howard (Step parent)  [phone] (Mobile)     How do you get to doctors appointments? car, drives self;family or friend will provide     Are you on dialysis? Yes     Dialysis Name and Scheduled days [facility]     Discharge Plan A Home with family     Discharge Plan B Home with family     Discharge Plan discussed with: Patient     Transition of Care Barriers None

## 2024-07-17 NOTE — NURSING
Nurses Note -- 4 Eyes      7/16/2024   11:31 PM      Skin assessed during: Admit      [x] No Altered Skin Integrity Present    []Prevention Measures Documented      [] Yes- Altered Skin Integrity Present or Discovered   [] LDA Added if Not in Epic (Describe Wound)   [] New Altered Skin Integrity was Present on Admit and Documented in LDA   [] Wound Image Taken    Wound Care Consulted? No    Attending Nurse:  Libby Woo RN/Staff Member:  [id number]

## 2024-07-17 NOTE — HPI
This is a 34-year-old female with end-stage kidney disease and on dialysis presented to the hospital after not feeling well during dialysis.  In the middle of dialysis the patient had abdominal discomfort and complained of diarrhea.  Dialysis was stopped and the patient was sent to the hospital.  Workup included CT of the abdomen and pelvis which resulted in diffuse body wall edema, mild circumferential wall thickening of the distal descending colon, sigmoid colon and rectum.  There was also circumferential bladder wall thickening.  This process could correlate with infectious/inflammatory process.  Patient was given antibiotics empirically in the ER.  Patient has chronic pain and complaints of abdominal pain.  No major fever or chills.  Nephrology was informed and they would like the patient to be admitted so that she complete her dialysis tomorrow.

## 2024-07-17 NOTE — PROGRESS NOTES
[facility] Medicine  Progress Note    Patient Name: Tabby Howard  MRN: [id number]  Patient Class: OP- Observation   Admission Date: 7/16/2024  Length of Stay: 0 days  Attending Physician: Alice Fish MD  Primary Care Provider: Melony Kim NP        Subjective:     Principal Problem:Abdominal pain        HPI:  This is a 34-year-old female with end-stage kidney disease and on dialysis presented to the hospital after not feeling well during dialysis.  In the middle of dialysis the patient had abdominal discomfort and complained of diarrhea.  Dialysis was stopped and the patient was sent to the hospital.  Workup included CT of the abdomen and pelvis which resulted in diffuse body wall edema, mild circumferential wall thickening of the distal descending colon, sigmoid colon and rectum.  There was also circumferential bladder wall thickening.  This process could correlate with infectious/inflammatory process.  Patient was given antibiotics empirically in the ER.  Patient has chronic pain and complaints of abdominal pain.  No major fever or chills.  Nephrology was informed and they would like the patient to be admitted so that she complete her dialysis tomorrow.    Overview/Hospital Course:  The patient was admitted and placed on prn pain meds. Her presentation was similar to past presentations of chronic abdominal pain. It was thought the findings on abdominal and pelvic CT were most consistent with fluid overload due to missed dialysis sessions. Nephrology  was consulted and dialysis orders were placed. Echo was ordered to evaluate pericardial effusion seen on CT.     Interval History: c/o abdominal pain    Review of Systems   Constitutional:  Negative for activity change, appetite change, chills and fever.   HENT:  Negative for congestion, ear pain, nosebleeds and sinus pain.    Eyes:  Negative for discharge and itching.   Respiratory:  Negative for apnea, cough, chest tightness  and shortness of breath.    Cardiovascular:  Negative for chest pain, palpitations and leg swelling.   Gastrointestinal:  Positive for abdominal distention and abdominal pain. Negative for vomiting.   Genitourinary:  Negative for difficulty urinating, dysuria, flank pain and frequency.   Musculoskeletal:  Negative for arthralgias, back pain, joint swelling and myalgias.   Skin:  Negative for color change, pallor and rash.   Neurological:  Negative for dizziness, weakness, light-headedness and headaches.   Psychiatric/Behavioral:  Negative for agitation, behavioral problems, confusion and suicidal ideas.      Objective:     Vital Signs (Most Recent):  Temp: 97.8 °F (36.6 °C) (07/17/24 1132)  Pulse: 85 (07/17/24 1132)  Resp: 18 (07/17/24 1132)  BP: 124/74 (07/17/24 1132)  SpO2: (!) 90 % (07/17/24 1132) Vital Signs (24h Range):  Temp:  [97.3 °F (36.3 °C)-99.4 °F (37.4 °C)] 97.8 °F (36.6 °C)  Pulse:  [70-89] 85  Resp:  [15-20] 18  SpO2:  [90 %-100 %] 90 %  BP: (108-157)/(62-88) 124/74     Weight: 65 kg (143 lb 4.8 oz)  Body mass index is 26.21 kg/m².    Intake/Output Summary (Last 24 hours) at 7/17/2024 1237  Last data filed at 7/17/2024 0913  Gross per 24 hour   Intake 470 ml   Output 0 ml   Net 470 ml         Physical Exam  Vitals reviewed.   Constitutional:       General: She is not in acute distress.     Appearance: Normal appearance. She is normal weight. She is not ill-appearing.   HENT:      Head: Normocephalic and atraumatic.      Nose: Nose normal.      Mouth/Throat:      Mouth: Mucous membranes are moist.      Pharynx: Oropharynx is clear.   Eyes:      General: No scleral icterus.     Extraocular Movements: Extraocular movements intact.      Conjunctiva/sclera: Conjunctivae normal.      Pupils: Pupils are equal, round, and reactive to light.   Cardiovascular:      Rate and Rhythm: Normal rate and regular rhythm.      Pulses: Normal pulses.      Heart sounds: Normal heart sounds. No murmur heard.     No gallop.    Pulmonary:      Effort: Pulmonary effort is normal. No respiratory distress.      Breath sounds: Normal breath sounds. No wheezing, rhonchi or rales.   Chest:      Chest wall: No tenderness.   Abdominal:      General: Abdomen is flat. There is distension.      Palpations: Abdomen is soft.      Tenderness: There is abdominal tenderness.   Musculoskeletal:         General: No swelling or tenderness.      Cervical back: Normal range of motion and neck supple. No rigidity or tenderness.      Right lower leg: No edema.      Left lower leg: No edema.   Skin:     General: Skin is warm and dry.   Neurological:      General: No focal deficit present.      Mental Status: She is alert and oriented to person, place, and time. Mental status is at baseline.      Motor: No weakness.   Psychiatric:         Mood and Affect: Mood normal.         Behavior: Behavior normal.         Thought Content: Thought content normal.             Significant Labs: All pertinent labs within the past 24 hours have been reviewed.    Significant Imaging: I have reviewed all pertinent imaging results/findings within the past 24 hours.    Assessment/Plan:      * Abdominal pain  - It is difficult to determine if the patient has colitis or any inflammatory process in the abdomen due to persistent and chronic generalized pain.  Also, her edema noted in the CT scan could likely be due to volume overload and not completing her dialysis.  - Patient received a dose of antibiotics in the ER.  - Currently with holding further antibiotics  - Follow lab   Monitor    Seizures    Continue Keppra    MDD (major depressive disorder)  Fluoxetine 20 mg daily    Hypertension  Amlodipine 5 mg daily   Carvedilol 25 mg p.o. b.i.d.   Hydralazine 25 mg t.i.d.  isosorbide mononitrate 30 mg daily    Deep vein thrombosis (DVT) of non-extremity vein  Continue apixaban      ESRD (end stage renal disease)  Nephrology consulted   Sevelamer    Pericardial effusion  CT of the chest  showed moderate to severe pericardial effusion.  Echo pending    Diabetes  Lantus 22 units daily   Insulin sliding scale      VTE Risk Mitigation (From admission, onward)           Ordered     Reason for No Pharmacological VTE Prophylaxis  Once        Question:  Reasons:  Answer:  Already adequately anticoagulated on oral Anticoagulants    07/16/24 2030     IP VTE HIGH RISK PATIENT  Once         07/16/24 2030     Place sequential compression device  Until discontinued         07/16/24 2030                    Discharge Planning   TATIANA: 7/18/2024     Code Status: Full Code   Is the patient medically ready for discharge?:     Reason for patient still in hospital (select all that apply): Patient trending condition  Discharge Plan A: Home with family                  Alice Fish MD, MD  Department of Hospital Medicine   [facility]

## 2024-07-17 NOTE — CONSULTS
INPATIENT NEPHROLOGY CONSULT   [facility]    Patient Name: Tabby Howard  Date: 07/17/2024    Reason for consultation: ESRD    Chief Complaint:   Chief Complaint   Patient presents with    Abdominal Pain     Patient was at dialysis and complained of lower back pain, did not finish  only took 3 kg at dialysis.  Has missed about 8 appointments in the last 6 weeks of dialysis. CBG was 58, D10 given.  SpO2 did drop from 97 to 79% on room.  Patient is on 3 liters.         History of Present Illness:  This is a 34-year-old female with end-stage kidney disease and on dialysis presented to the hospital after not feeling well during dialysis. In the middle of dialysis the patient had abdominal discomfort and complained of diarrhea. Dialysis was stopped and the patient was sent to the hospital. She got 2L off. She was 12L above dry weight. Workup included CT of the abdomen and pelvis which resulted in diffuse body wall edema, mild circumferential wall thickening of the distal descending colon, sigmoid colon and rectum. There was also circumferential bladder wall thickening. This process could correlate with infectious/inflammatory process. Patient was given antibiotics empirically in the ER. Patient has chronic pain and complaints of abdominal pain. No major fever or chills. Consulted for dialysis.    Interval History:  7/17- IUF today, HD/UF MWF    Plan of Care:    Assessment:  ESRD on HD MWF  HTN/Hypervolemia  SHPT  Anemia of CKD    Plan:    - ordered HD MWF  - resume home BP meds  - ordered daily UF- 3-4L  - renal diet, 1.5L fluid restriction  - resume binder with meals  - ordered [facility] with HD    Thank you for allowing us to participate in this patient's care. We will continue to follow.    Vital Signs:  Temp Readings from Last 3 Encounters:   07/17/24 98.3 °F (36.8 °C) (Oral)   06/23/24 98.2 °F (36.8 °C) (Oral)   05/13/24 98.3 °F (36.8 °C) (Oral)       Pulse Readings from Last 3 Encounters:   07/17/24  70   24 90   24 75       BP Readings from Last 3 Encounters:   24 (!) 144/83   24 (!) 169/85   24 (!) 149/79       Weight:  Wt Readings from Last 3 Encounters:   24 65 kg (143 lb 4.8 oz)   24 52.6 kg (116 lb)   24 57 kg (125 lb 10.6 oz)       Past Medical & Surgical History:  Past Medical History:   Diagnosis Date    ESRD on hemodialysis 2022    Gastritis 2022    EGD was 22    Gastroparesis 2022    has not had Emptying study    Heart failure with preserved ejection fraction 2022    EF 55% on 3/22    History of supraventricular tachycardia     Hyperkalemia 2022    Hypertensive emergency 2022    Sickle cell trait 2022    Type 2 diabetes mellitus        Past Surgical History:   Procedure Laterality Date     SECTION      x 3    COLONOSCOPY      COLONOSCOPY N/A 2022    Procedure: COLONOSCOPY;  Surgeon: Jagdeep Cedeno MD;  Location: [facility];  Service: Endoscopy;  Laterality: N/A;    DESTRUCTION, CILIARY BODY, USING LASER Left 3/8/2024    Procedure: Cyclophotocoagulation G-probe, retrobulbar chlorpromazine left eye;  Surgeon: Fidel Wise MD;  Location: [facility] OR [address] [place] [address];  Service: Ophthalmology;  Laterality: Left;    ENDOSCOPIC ULTRASOUND OF UPPER GASTROINTESTINAL TRACT N/A 2023    Procedure: ULTRASOUND, UPPER GI TRACT, ENDOSCOPIC;  Surgeon: Micky Paredes III, MD;  Location: [facility];  Service: Endoscopy;  Laterality: N/A;    ESOPHAGOGASTRODUODENOSCOPY N/A 10/18/2019    Procedure: ESOPHAGOGASTRODUODENOSCOPY (EGD);  Surgeon: Gianluca Mendez MD;  Location: [facility];  Service: Endoscopy;  Laterality: N/A;    ESOPHAGOGASTRODUODENOSCOPY N/A 2022    Procedure: EGD (ESOPHAGOGASTRODUODENOSCOPY);  Surgeon: Micky Paredes III, MD;  Location: [facility];  Service: Endoscopy;  Laterality: N/A;    ESOPHAGOGASTRODUODENOSCOPY N/A 2022    Procedure: EGD (ESOPHAGOGASTRODUODENOSCOPY);  Surgeon: Marcelo  CHELY Zhong MD;  Location: [facility] ENDO;  Service: Endoscopy;  Laterality: N/A;    INSERTION, CATHETER, TUNNELED N/A 6/17/2023    Procedure: Insertion,catheter,tunneled;  Surgeon: Carlos Thurman Jr., MD;  Location: [facility] OR;  Service: General;  Laterality: N/A;    LAPAROSCOPIC CHOLECYSTECTOMY N/A 07/30/2022    Procedure: CHOLECYSTECTOMY, LAPAROSCOPIC;  Surgeon: Grey Perez MD;  Location: [facility] OR;  Service: General;  Laterality: N/A;    PLACEMENT OF DUAL-LUMEN VASCULAR CATHETER Left 07/12/2022    Procedure: INSERTION-CATHETER-JOSEPH;  Surgeon: Dionte Gan MD;  Location: [facility] OR;  Service: General;  Laterality: Left;    PLACEMENT OF DUAL-LUMEN VASCULAR CATHETER Right 07/26/2022    Procedure: INSERTION-CATHETER-Hemosplit;  Surgeon: Dionte Gan MD;  Location: [facility] OR;  Service: General;  Laterality: Right;       Past Social History:  Social History     Socioeconomic History    Marital status:    Tobacco Use    Smoking status: Never    Smokeless tobacco: Never   Substance and Sexual Activity    Alcohol use: Not Currently    Drug use: No    Sexual activity: Not Currently     Partners: Male     Birth control/protection: I.U.D.     Social Determinants of Health     Financial Resource Strain: Low Risk  (2/28/2024)    Overall Financial Resource Strain (CARDIA)     Difficulty of Paying Living Expenses: Not very hard   Food Insecurity: No Food Insecurity (2/28/2024)    Hunger Vital Sign     Worried About Running Out of Food in the Last Year: Never true     Ran Out of Food in the Last Year: Never true   Transportation Needs: No Transportation Needs (2/28/2024)    PRAPARE - Transportation     Lack of Transportation (Medical): No     Lack of Transportation (Non-Medical): No   Physical Activity: Insufficiently Active (2/28/2024)    Exercise Vital Sign     Days of Exercise per Week: 1 day     Minutes of Exercise per Session: 10 min   Stress: No Stress Concern Present (2/28/2024)    [place] [employer]  [employer] - Occupational Stress Questionnaire     Feeling of Stress : Only a little   Housing Stability: Low Risk  (2/28/2024)    Housing Stability Vital Sign     Unable to Pay for Housing in the Last Year: No     Number of Places Lived in the Last Year: 2     Unstable Housing in the Last Year: No       Medications:  Scheduled Meds:  Continuous Infusions:  PRN Meds:.  Current Facility-Administered Medications:     acetaminophen, 650 mg, Oral, Q8H PRN    acetaminophen, 650 mg, Oral, Q4H PRN    aluminum-magnesium hydroxide-simethicone, 30 mL, Oral, QID PRN    dextrose 50%, 12.5 g, Intravenous, PRN    dextrose 50%, 25 g, Intravenous, PRN    glucagon (human recombinant), 1 mg, Intramuscular, PRN    glucose, 16 g, Oral, PRN    glucose, 24 g, Oral, PRN    HYDROcodone-acetaminophen, 1 tablet, Oral, Q6H PRN    insulin aspart U-100, 0-5 Units, Subcutaneous, QID (AC + HS) PRN    magnesium oxide, 800 mg, Oral, PRN    magnesium oxide, 800 mg, Oral, PRN    melatonin, 6 mg, Oral, Nightly PRN    naloxone, 0.02 mg, Intravenous, PRN    ondansetron, 4 mg, Intravenous, Q6H PRN    potassium bicarbonate, 35 mEq, Oral, PRN    potassium bicarbonate, 50 mEq, Oral, PRN    potassium bicarbonate, 60 mEq, Oral, PRN    potassium, sodium phosphates, 2 packet, Oral, PRN    potassium, sodium phosphates, 2 packet, Oral, PRN    potassium, sodium phosphates, 2 packet, Oral, PRN    sodium chloride 0.9%, 10 mL, Intravenous, Q12H PRN  No current facility-administered medications on file prior to encounter.     Current Outpatient Medications on File Prior to Encounter   Medication Sig Dispense Refill    acetaZOLAMIDE (DIAMOX) 250 MG tablet take 1 tablet by mouth 3 times a day (Patient taking differently: Take 250 mg by mouth 3 (three) times daily.) 90 tablet 3    albuterol (VENTOLIN HFA) 90 mcg/actuation inhaler Inhale 2 puffs every 4 hours by inhalation route. (Patient taking differently: Inhale 2 puffs into the lungs every 4 (four) hours as  needed for Shortness of Breath or Wheezing.) 8 g 2    amLODIPine (NORVASC) 5 MG tablet Take 1 tablet (5 mg total) by mouth once daily. 30 tablet 11    apixaban (ELIQUIS) 5 mg Tab Take 1 tablet (5 mg total) by mouth 2 (two) times daily. Patient w/ thrombocytopenia <50, so held during admission, follow-up with hematologist about restarting 180 tablet 1    atropine 1% (ISOPTO ATROPINE) 1 % Drop Place 1 drop into the left eye 2 (two) times a day. 15 mL 3    blood sugar diagnostic (TRUE METRIX GLUCOSE TEST STRIP) Strp Use 1 strip to check blood glucose three times daily 100 each 11    blood-glucose meter (TRUE METRIX GLUCOSE METER) Misc Use to check blood glucose 1 each 0    blood-glucose meter,continuous (DEXCOM ) Misc USE WITH SENSORS 1 each 0    blood-glucose sensor Heather CHANGE EVERY 10 DAYS 3 each 0    brimonidine 0.15 % OPTH DROP (ALPHAGAN) 0.15 % ophthalmic solution Place 1 drop into both eyes 3 (three) times daily. 15 mL 3    brinzolamide (AZOPT) 1 % ophthalmic suspension Place1 drop in left eye 3 times a day for 30 days (Patient taking differently: Place 1 drop into both eyes 3 (three) times daily.) 15 mL 6    busPIRone (BUSPAR) 10 MG tablet Take 1 tablet (10 mg total) by mouth 3 (three) times daily as needed (anxiety). 60 tablet 5    carvediloL (COREG) 25 MG tablet take 1 tablet Oral 2 times daily (Patient taking differently: Take 25 mg by mouth 2 (two) times daily.) 60 tablet 0    cetirizine (ZYRTEC) 10 MG tablet Take 1 tablet every day by oral route. (Patient taking differently: Take 10 mg by mouth once daily.) 30 tablet 0    dorzolamide-timolol 2-0.5% (COSOPT) 22.3-6.8 mg/mL ophthalmic solution place 1 drop in left eye twice a day  for 30 days (Patient taking differently: Place 1 drop into both eyes 2 (two) times daily.) 10 mL 0    FLUoxetine 20 MG capsule Take 1 capsule every day by oral route for 90 days. (Patient taking differently: Take 20 mg by mouth once daily.) 90 capsule 1    FLUoxetine 40 MG  capsule Take 1 capsule every day by oral route. (Patient taking differently: Take 40 mg by mouth once daily.) 30 capsule 2    fluticasone propionate (FLONASE ALLERGY RELIEF) 50 mcg/actuation nasal spray [place] 1 spray every day by intranasal route. (Patient taking differently: 1 spray by Each Nostril route once daily.) 16 g 2    gabapentin (NEURONTIN) 300 MG capsule Take 2 capsules twice a day by oral route for 90 days. (Patient taking differently: Take 600 mg by mouth 2 (two) times daily.) 360 capsule 1    hydrALAZINE (APRESOLINE) 25 MG tablet take 1 tablet by mouth every 8 hours (Patient taking differently: Take 25 mg by mouth every 8 (eight) hours.) 90 tablet 0    insulin detemir U-100, Levemir, (LEVEMIR FLEXTOUCH U100 INSULIN) 100 unit/mL (3 mL) InPn pen Inject 22 Units into the skin every evening. Patient not needing insulin in-patient. Follow-up with PCP for hospital follow-up and restarting the medication. Or restart medication if glucose >200 consistently resume home insulin regimen. Or if glucose is persistently less than 100, decrease by 5 units until following up with PCP 15 mL 1    insulin glargine U-100, Lantus, 100 unit/mL injection inject 13 unit subcutaneously 2 times daily (Patient taking differently: Inject 13 Units into the skin 2 (two) times a day.) 10 mL 0    isosorbide mononitrate (IMDUR) 30 MG 24 hr tablet Take 1 tablet (30 mg total) by mouth once daily. 90 tablet 1    lancets (TRUEPLUS LANCETS) 33 gauge Misc Use 1 to check blood glucose three times daily 100 each 11    lancets 33 gauge Misc Use to test twice daily 100 each 5    levETIRAcetam (KEPPRA) 500 MG Tab Take 1 tablet (500 mg total) by mouth 2 (two) times daily. 60 tablet 11    LORazepam (ATIVAN) 0.5 MG tablet Take 1 tablet 3 times a day by oral route for 7 days, for severe panic. (Patient taking differently: Take 0.5 mg by mouth 3 (three) times daily.) 21 tablet 2    ondansetron (ZOFRAN-ODT) 4 MG TbDL Place 1 tablet (4 mg) on or under  "the tongue every 8 hours for 10 days. (Patient taking differently: Take 8 mg by mouth every 8 (eight) hours as needed.) 24 tablet 2    oxyCODONE-acetaminophen (PERCOCET)  mg per tablet Take 1 tablet by mouth every 4 (four) hours as needed for Pain. 42 tablet 0    pantoprazole (PROTONIX) 40 MG tablet Take 1 tablet (40 mg total) by mouth once daily. 30 tablet 0    pen needle, diabetic 31 gauge x 3/16" Ndle Use as directed with insulin once daily 100 each 0    prednisoLONE acetate (PRED FORTE) 1 % DrpS Place 1 drop into the left eye 2 (two) times daily. 5 mL 3    predniSONE (DELTASONE) 20 MG tablet take 3 tablets Oral Daily,x30 day(s) (Patient taking differently: Take 20 mg by mouth once daily.) 90 tablet 0    sevelamer carbonate (RENVELA) 800 mg Tab Take 1 tablet (800 mg total) by mouth 3 (three) times daily with meals. 180 tablet 11    sucralfate (CARAFATE) 100 mg/mL suspension Take 10 mL 4 times a day by oral route before meals for 30 days. (Patient taking differently: Take 10 mLs by mouth 4 (four) times daily with meals and nightly.) 1200 mL 1    timolol maleate 0.5% (TIMOPTIC) 0.5 % Drop Place 1 drop in left eye 2 times a day for 30 days (Patient taking differently: Place 1 drop into both eyes 2 (two) times daily.) 5 mL 6    tobramycin-dexAMETHasone 0.3-0.1% (TOBRADEX) 0.3-0.1 % DrpS 1 drop Eye-Left every 6 hours for 5 day(s) 5 mL 0    [DISCONTINUED] atenoloL (TENORMIN) 50 MG tablet Take 1 tablet (50 mg total) by mouth every other day. 45 tablet 3    [DISCONTINUED] omeprazole (PRILOSEC) 20 MG capsule Take 2 capsules (40 mg total) by mouth once daily. for 10 days 20 capsule 0       Allergies:  Droperidol and Penicillins    Past Family History:  Reviewed; refer to Hospitalist Admission Note    Review of Systems:  Not in ER- Moving floors    Physical Exam:  Not in ER    Results:  Lab Results   Component Value Date     (L) 07/17/2024    K 4.4 07/17/2024    CL 97 07/17/2024    CO2 24 07/17/2024    BUN 57 " (H) 07/17/2024    CREATININE 6.4 (H) 07/17/2024    CALCIUM 7.7 (L) 07/17/2024    ANIONGAP 14 07/17/2024    ESTGFRAFRICA 19 (L) 09/03/2022    EGFRNONAA 18 (A) 07/31/2022       Lab Results   Component Value Date    CALCIUM 7.7 (L) 07/17/2024    PHOS 7.5 (H) 06/22/2024       Recent Labs   Lab 07/17/24  0620   WBC 5.61   RBC 2.87*   HGB 8.5*   HCT 26.9*   PLT 66*   MCV 94   MCH 29.6   MCHC 31.6*       I have personally reviewed pertinent radiological imaging and reports from this admission.    I have spent > 70 minutes providing care for this patient for the above diagnoses. These services have included chart/data/imaging review, evaluation, exam, formulation of plan, , note preparation, and discussions with staff involved in this patient's care.    Prateek Clark MD MPH  [facility]  [address] [place] [address]  [place] [address]  [phone] (p)  [phone] (f)

## 2024-07-18 LAB
ALBUMIN SERPL BCP-MCNC: 3.4 G/DL (ref 3.5–5.2)
ALP SERPL-CCNC: 114 U/L (ref 55–135)
ALT SERPL W/O P-5'-P-CCNC: 82 U/L (ref 10–44)
ANION GAP SERPL CALC-SCNC: 11 MMOL/L (ref 8–16)
ANION GAP SERPL CALC-SCNC: 13 MMOL/L (ref 8–16)
AST SERPL-CCNC: 127 U/L (ref 10–40)
BASOPHILS # BLD AUTO: 0.01 K/UL (ref 0–0.2)
BASOPHILS NFR BLD: 0.3 % (ref 0–1.9)
BILIRUB SERPL-MCNC: 1.9 MG/DL (ref 0.1–1)
BUN SERPL-MCNC: 29 MG/DL (ref 6–20)
BUN SERPL-MCNC: 65 MG/DL (ref 6–20)
CALCIUM SERPL-MCNC: 7.9 MG/DL (ref 8.7–10.5)
CALCIUM SERPL-MCNC: 8.1 MG/DL (ref 8.7–10.5)
CHLORIDE SERPL-SCNC: 102 MMOL/L (ref 95–110)
CHLORIDE SERPL-SCNC: 98 MMOL/L (ref 95–110)
CO2 SERPL-SCNC: 25 MMOL/L (ref 23–29)
CO2 SERPL-SCNC: 25 MMOL/L (ref 23–29)
CREAT SERPL-MCNC: 4.2 MG/DL (ref 0.5–1.4)
CREAT SERPL-MCNC: 7.2 MG/DL (ref 0.5–1.4)
CRP SERPL-MCNC: 2.4 MG/DL
DIFFERENTIAL METHOD BLD: ABNORMAL
EOSINOPHIL # BLD AUTO: 0.1 K/UL (ref 0–0.5)
EOSINOPHIL NFR BLD: 1.4 % (ref 0–8)
ERYTHROCYTE [DISTWIDTH] IN BLOOD BY AUTOMATED COUNT: 17.9 % (ref 11.5–14.5)
EST. GFR  (NO RACE VARIABLE): 13.5 ML/MIN/1.73 M^2
EST. GFR  (NO RACE VARIABLE): 7.1 ML/MIN/1.73 M^2
GLUCOSE SERPL-MCNC: 101 MG/DL (ref 70–110)
GLUCOSE SERPL-MCNC: 112 MG/DL (ref 70–110)
GLUCOSE SERPL-MCNC: 123 MG/DL (ref 70–110)
GLUCOSE SERPL-MCNC: 134 MG/DL (ref 70–110)
GLUCOSE SERPL-MCNC: 155 MG/DL (ref 70–110)
GLUCOSE SERPL-MCNC: 34 MG/DL (ref 70–110)
GLUCOSE SERPL-MCNC: 42 MG/DL (ref 70–110)
GLUCOSE SERPL-MCNC: 43 MG/DL (ref 70–110)
GLUCOSE SERPL-MCNC: 51 MG/DL (ref 70–110)
GLUCOSE SERPL-MCNC: 64 MG/DL (ref 70–110)
GLUCOSE SERPL-MCNC: 69 MG/DL (ref 70–110)
GLUCOSE SERPL-MCNC: 73 MG/DL (ref 70–110)
GLUCOSE SERPL-MCNC: 78 MG/DL (ref 70–110)
GLUCOSE SERPL-MCNC: 81 MG/DL (ref 70–110)
GLUCOSE SERPL-MCNC: 85 MG/DL (ref 70–110)
GLUCOSE SERPL-MCNC: 87 MG/DL (ref 70–110)
HCT VFR BLD AUTO: 23.1 % (ref 37–48.5)
HGB BLD-MCNC: 7.5 G/DL (ref 12–16)
IMM GRANULOCYTES # BLD AUTO: 0.02 K/UL (ref 0–0.04)
IMM GRANULOCYTES NFR BLD AUTO: 0.5 % (ref 0–0.5)
LYMPHOCYTES # BLD AUTO: 0.5 K/UL (ref 1–4.8)
LYMPHOCYTES NFR BLD: 12.7 % (ref 18–48)
MAGNESIUM SERPL-MCNC: 2 MG/DL (ref 1.6–2.6)
MAGNESIUM SERPL-MCNC: 2.2 MG/DL (ref 1.6–2.6)
MCH RBC QN AUTO: 29.6 PG (ref 27–31)
MCHC RBC AUTO-ENTMCNC: 32.5 G/DL (ref 32–36)
MCV RBC AUTO: 91 FL (ref 82–98)
MONOCYTES # BLD AUTO: 0.3 K/UL (ref 0.3–1)
MONOCYTES NFR BLD: 8.1 % (ref 4–15)
NEUTROPHILS # BLD AUTO: 2.8 K/UL (ref 1.8–7.7)
NEUTROPHILS NFR BLD: 77 % (ref 38–73)
NRBC BLD-RTO: 0 /100 WBC
PLATELET # BLD AUTO: 62 K/UL (ref 150–450)
PMV BLD AUTO: 11.3 FL (ref 9.2–12.9)
POTASSIUM SERPL-SCNC: 4.2 MMOL/L (ref 3.5–5.1)
POTASSIUM SERPL-SCNC: 5.2 MMOL/L (ref 3.5–5.1)
PROT SERPL-MCNC: 5.5 G/DL (ref 6–8.4)
RBC # BLD AUTO: 2.53 M/UL (ref 4–5.4)
SODIUM SERPL-SCNC: 136 MMOL/L (ref 136–145)
SODIUM SERPL-SCNC: 138 MMOL/L (ref 136–145)
TROPONIN I SERPL HS-MCNC: 53.5 PG/ML (ref 0–14.9)
WBC # BLD AUTO: 3.69 K/UL (ref 3.9–12.7)

## 2024-07-18 PROCEDURE — 63600175 PHARM REV CODE 636 W HCPCS: Performed by: HOSPITALIST

## 2024-07-18 PROCEDURE — 63600175 PHARM REV CODE 636 W HCPCS: Performed by: INTERNAL MEDICINE

## 2024-07-18 PROCEDURE — 25000242 PHARM REV CODE 250 ALT 637 W/ HCPCS: Performed by: HOSPITALIST

## 2024-07-18 PROCEDURE — 86140 C-REACTIVE PROTEIN: CPT | Performed by: INTERNAL MEDICINE

## 2024-07-18 PROCEDURE — S5010 5% DEXTROSE AND 0.45% SALINE: HCPCS | Performed by: INTERNAL MEDICINE

## 2024-07-18 PROCEDURE — 36415 COLL VENOUS BLD VENIPUNCTURE: CPT | Performed by: HOSPITALIST

## 2024-07-18 PROCEDURE — 25000003 PHARM REV CODE 250: Performed by: INTERNAL MEDICINE

## 2024-07-18 PROCEDURE — 94761 N-INVAS EAR/PLS OXIMETRY MLT: CPT

## 2024-07-18 PROCEDURE — 36415 COLL VENOUS BLD VENIPUNCTURE: CPT | Performed by: INTERNAL MEDICINE

## 2024-07-18 PROCEDURE — 25000003 PHARM REV CODE 250: Performed by: HOSPITALIST

## 2024-07-18 PROCEDURE — 99223 1ST HOSP IP/OBS HIGH 75: CPT | Mod: ,,, | Performed by: GENERAL PRACTICE

## 2024-07-18 PROCEDURE — 82947 ASSAY GLUCOSE BLOOD QUANT: CPT | Performed by: INTERNAL MEDICINE

## 2024-07-18 PROCEDURE — 83735 ASSAY OF MAGNESIUM: CPT | Mod: 91 | Performed by: INTERNAL MEDICINE

## 2024-07-18 PROCEDURE — 90935 HEMODIALYSIS ONE EVALUATION: CPT

## 2024-07-18 PROCEDURE — 25000242 PHARM REV CODE 250 ALT 637 W/ HCPCS: Performed by: INTERNAL MEDICINE

## 2024-07-18 PROCEDURE — 12000002 HC ACUTE/MED SURGE SEMI-PRIVATE ROOM

## 2024-07-18 PROCEDURE — 99900035 HC TECH TIME PER 15 MIN (STAT)

## 2024-07-18 PROCEDURE — 99900031 HC PATIENT EDUCATION (STAT)

## 2024-07-18 PROCEDURE — 80048 BASIC METABOLIC PNL TOTAL CA: CPT | Performed by: INTERNAL MEDICINE

## 2024-07-18 PROCEDURE — 84484 ASSAY OF TROPONIN QUANT: CPT | Performed by: INTERNAL MEDICINE

## 2024-07-18 PROCEDURE — 80053 COMPREHEN METABOLIC PANEL: CPT | Performed by: HOSPITALIST

## 2024-07-18 PROCEDURE — 85025 COMPLETE CBC W/AUTO DIFF WBC: CPT | Performed by: HOSPITALIST

## 2024-07-18 PROCEDURE — 83735 ASSAY OF MAGNESIUM: CPT | Performed by: HOSPITALIST

## 2024-07-18 RX ORDER — SODIUM CHLORIDE 9 MG/ML
INJECTION, SOLUTION INTRAVENOUS ONCE
Status: CANCELLED | OUTPATIENT
Start: 2024-07-18 | End: 2024-07-18

## 2024-07-18 RX ORDER — DEXTROSE MONOHYDRATE 100 MG/ML
INJECTION, SOLUTION INTRAVENOUS CONTINUOUS
Status: DISCONTINUED | OUTPATIENT
Start: 2024-07-18 | End: 2024-07-18

## 2024-07-18 RX ORDER — DEXTROSE MONOHYDRATE 100 MG/ML
INJECTION, SOLUTION INTRAVENOUS CONTINUOUS
Status: DISCONTINUED | OUTPATIENT
Start: 2024-07-18 | End: 2024-07-20

## 2024-07-18 RX ORDER — HYDROMORPHONE HYDROCHLORIDE 1 MG/ML
0.5 INJECTION, SOLUTION INTRAMUSCULAR; INTRAVENOUS; SUBCUTANEOUS ONCE
Status: COMPLETED | OUTPATIENT
Start: 2024-07-18 | End: 2024-07-18

## 2024-07-18 RX ORDER — HYDROCODONE BITARTRATE AND ACETAMINOPHEN 10; 325 MG/1; MG/1
1 TABLET ORAL EVERY 4 HOURS PRN
Status: DISCONTINUED | OUTPATIENT
Start: 2024-07-18 | End: 2024-07-23 | Stop reason: HOSPADM

## 2024-07-18 RX ORDER — MORPHINE SULFATE 2 MG/ML
2 INJECTION, SOLUTION INTRAMUSCULAR; INTRAVENOUS ONCE
Status: COMPLETED | OUTPATIENT
Start: 2024-07-18 | End: 2024-07-18

## 2024-07-18 RX ORDER — MUPIROCIN 20 MG/G
OINTMENT TOPICAL 2 TIMES DAILY
Status: DISCONTINUED | OUTPATIENT
Start: 2024-07-18 | End: 2024-07-23 | Stop reason: HOSPADM

## 2024-07-18 RX ORDER — LABETALOL HYDROCHLORIDE 5 MG/ML
20 INJECTION, SOLUTION INTRAVENOUS ONCE
Status: DISCONTINUED | OUTPATIENT
Start: 2024-07-18 | End: 2024-07-21

## 2024-07-18 RX ORDER — DIAZEPAM 10 MG/2ML
5 INJECTION INTRAMUSCULAR ONCE
Status: COMPLETED | OUTPATIENT
Start: 2024-07-18 | End: 2024-07-18

## 2024-07-18 RX ORDER — MUPIROCIN 20 MG/G
OINTMENT TOPICAL 2 TIMES DAILY
Status: CANCELLED | OUTPATIENT
Start: 2024-07-18 | End: 2024-07-23

## 2024-07-18 RX ORDER — DIPHENHYDRAMINE HCL 25 MG
25 CAPSULE ORAL EVERY 6 HOURS PRN
Status: DISCONTINUED | OUTPATIENT
Start: 2024-07-18 | End: 2024-07-23 | Stop reason: HOSPADM

## 2024-07-18 RX ORDER — DEXTROSE MONOHYDRATE AND SODIUM CHLORIDE 5; .45 G/100ML; G/100ML
INJECTION, SOLUTION INTRAVENOUS CONTINUOUS
Status: DISCONTINUED | OUTPATIENT
Start: 2024-07-18 | End: 2024-07-18

## 2024-07-18 RX ORDER — NITROGLYCERIN 0.4 MG/1
0.4 TABLET SUBLINGUAL EVERY 5 MIN PRN
Status: DISCONTINUED | OUTPATIENT
Start: 2024-07-18 | End: 2024-07-23 | Stop reason: HOSPADM

## 2024-07-18 RX ADMIN — HYDRALAZINE HYDROCHLORIDE 25 MG: 25 TABLET ORAL at 05:07

## 2024-07-18 RX ADMIN — NITROGLYCERIN 0.4 MG: 0.4 TABLET SUBLINGUAL at 04:07

## 2024-07-18 RX ADMIN — MORPHINE SULFATE 2 MG: 2 INJECTION, SOLUTION INTRAMUSCULAR; INTRAVENOUS at 04:07

## 2024-07-18 RX ADMIN — ALUMINUM HYDROXIDE, MAGNESIUM HYDROXIDE, AND SIMETHICONE 30 ML: 200; 200; 20 SUSPENSION ORAL at 08:07

## 2024-07-18 RX ADMIN — DEXTROSE AND SODIUM CHLORIDE: 5; 450 INJECTION, SOLUTION INTRAVENOUS at 04:07

## 2024-07-18 RX ADMIN — MUPIROCIN 1 G: 20 OINTMENT TOPICAL at 09:07

## 2024-07-18 RX ADMIN — HYDROMORPHONE HYDROCHLORIDE 0.5 MG: 0.5 INJECTION, SOLUTION INTRAMUSCULAR; INTRAVENOUS; SUBCUTANEOUS at 07:07

## 2024-07-18 RX ADMIN — NITROGLYCERIN 0.4 MG: 0.4 TABLET SUBLINGUAL at 05:07

## 2024-07-18 RX ADMIN — DIAZEPAM 5 MG: 5 INJECTION, SOLUTION INTRAMUSCULAR; INTRAVENOUS at 08:07

## 2024-07-18 RX ADMIN — ONDANSETRON 4 MG: 2 INJECTION INTRAMUSCULAR; INTRAVENOUS at 09:07

## 2024-07-18 RX ADMIN — GABAPENTIN 200 MG: 100 CAPSULE ORAL at 09:07

## 2024-07-18 RX ADMIN — GABAPENTIN 200 MG: 100 CAPSULE ORAL at 08:07

## 2024-07-18 RX ADMIN — LEVETIRACETAM 500 MG: 500 TABLET, FILM COATED ORAL at 09:07

## 2024-07-18 RX ADMIN — LEVETIRACETAM 500 MG: 500 TABLET, FILM COATED ORAL at 08:07

## 2024-07-18 RX ADMIN — SEVELAMER CARBONATE 800 MG: 800 TABLET, FILM COATED ORAL at 08:07

## 2024-07-18 RX ADMIN — OXYCODONE HYDROCHLORIDE AND ACETAMINOPHEN 1 TABLET: 10; 325 TABLET ORAL at 03:07

## 2024-07-18 RX ADMIN — CARVEDILOL 25 MG: 25 TABLET, FILM COATED ORAL at 09:07

## 2024-07-18 RX ADMIN — PANTOPRAZOLE SODIUM 40 MG: 40 TABLET, DELAYED RELEASE ORAL at 05:07

## 2024-07-18 RX ADMIN — DEXTROSE MONOHYDRATE 25 G: 25 INJECTION, SOLUTION INTRAVENOUS at 02:07

## 2024-07-18 RX ADMIN — FLUOXETINE 20 MG: 20 CAPSULE ORAL at 08:07

## 2024-07-18 RX ADMIN — HYDRALAZINE HYDROCHLORIDE 25 MG: 25 TABLET ORAL at 09:07

## 2024-07-18 RX ADMIN — MORPHINE SULFATE 2 MG: 2 INJECTION, SOLUTION INTRAMUSCULAR; INTRAVENOUS at 06:07

## 2024-07-18 RX ADMIN — APIXABAN 5 MG: 5 TABLET, FILM COATED ORAL at 08:07

## 2024-07-18 RX ADMIN — DEXTROSE: 10 SOLUTION INTRAVENOUS at 11:07

## 2024-07-18 RX ADMIN — DEXTROSE MONOHYDRATE 12.5 G: 25 INJECTION, SOLUTION INTRAVENOUS at 07:07

## 2024-07-18 RX ADMIN — APIXABAN 5 MG: 5 TABLET, FILM COATED ORAL at 09:07

## 2024-07-18 RX ADMIN — FLUTICASONE PROPIONATE 50 MCG: 50 SPRAY, METERED NASAL at 08:07

## 2024-07-18 NOTE — CONSULTS
[facility]  Department of Cardiology  Consult Note      PATIENT NAME: Tabby Howard  MRN: [id number]  TODAY'S DATE: 07/18/2024  ADMIT DATE: 7/16/2024                          CONSULT REQUESTED BY: Alice Fish MD    SUBJECTIVE     PRINCIPAL PROBLEM: Abdominal pain      REASON FOR CONSULT:  Pericardial effusion     HPI:  Ms. Howard is a 34 year old female with pmh of ESRD on dialysis, T2DM, HTN who presented to ER on Tuesday afternoon with complaints of abdominal pain/diarrhea during dialysis. CT of abdomen showed moderate cardiomegaly with large pericardial effusion and diffuse body wall edema. An ECHO was then obtained which showed EF 55-60% and Garde III diastolic dysfunction with a small circumferential effusion that is echolucent - possible indication of cardiac tamponade. Cardiology was then consulted.     Per hospital medicine notes:  This is a 34-year-old female with end-stage kidney disease and on dialysis presented to the hospital after not feeling well during dialysis. In the middle of dialysis the patient had abdominal discomfort and complained of diarrhea. Dialysis was stopped and the patient was sent to the hospital. Workup included CT of the abdomen and pelvis which resulted in diffuse body wall edema, mild circumferential wall thickening of the distal descending colon, sigmoid colon and rectum. There was also circumferential bladder wall thickening. This process could correlate with infectious/inflammatory process. Patient was given antibiotics empirically in the ER. Patient has chronic pain and complaints of abdominal pain. No major fever or chills. Nephrology was informed and they would like the patient to be admitted so that she complete her dialysis tomorrow.         Review of patient's allergies indicates:   Allergen Reactions    Droperidol Hives    Penicillins Hives       Past Medical History:   Diagnosis Date    ESRD on hemodialysis 04/12/2022    Gastritis 12/2022    EGD was  22    Gastroparesis 2022    has not had Emptying study    Heart failure with preserved ejection fraction 2022    EF 55% on 3/22    History of supraventricular tachycardia     Hyperkalemia 2022    Hypertensive emergency 2022    Sickle cell trait 2022    Type 2 diabetes mellitus      Past Surgical History:   Procedure Laterality Date     SECTION      x 3    COLONOSCOPY      COLONOSCOPY N/A 2022    Procedure: COLONOSCOPY;  Surgeon: Jagdeep Cedeno MD;  Location: [facility];  Service: Endoscopy;  Laterality: N/A;    DESTRUCTION, CILIARY BODY, USING LASER Left 3/8/2024    Procedure: Cyclophotocoagulation G-probe, retrobulbar chlorpromazine left eye;  Surgeon: Fidel Wise MD;  Location: [address];  Service: Ophthalmology;  Laterality: Left;    ENDOSCOPIC ULTRASOUND OF UPPER GASTROINTESTINAL TRACT N/A 2023    Procedure: ULTRASOUND, UPPER GI TRACT, ENDOSCOPIC;  Surgeon: Micky Paredes III, MD;  Location: [facility];  Service: Endoscopy;  Laterality: N/A;    ESOPHAGOGASTRODUODENOSCOPY N/A 10/18/2019    Procedure: ESOPHAGOGASTRODUODENOSCOPY (EGD);  Surgeon: Gianluca Mendez MD;  Location: [facility];  Service: Endoscopy;  Laterality: N/A;    ESOPHAGOGASTRODUODENOSCOPY N/A 2022    Procedure: EGD (ESOPHAGOGASTRODUODENOSCOPY);  Surgeon: Micky Paredes III, MD;  Location: [facility];  Service: Endoscopy;  Laterality: N/A;    ESOPHAGOGASTRODUODENOSCOPY N/A 2022    Procedure: EGD (ESOPHAGOGASTRODUODENOSCOPY);  Surgeon: Marcelo Zhong MD;  Location: [facility] END[facility];  Service: Endoscopy;  Laterality: N/A;    INSERTION, CATHETER, TUNNELED N/A 2023    Procedure: Insertion,catheter,tunneled;  Surgeon: Carlos Thurman Jr., MD;  Location: [facility];  Service: General;  Laterality: N/A;    LAPAROSCOPIC CHOLECYSTECTOMY N/A 2022    Procedure: CHOLECYSTECTOMY, LAPAROSCOPIC;  Surgeon: Grey Perez MD;  Location: [facility];  Service: General;   Laterality: N/A;    PLACEMENT OF DUAL-LUMEN VASCULAR CATHETER Left 07/12/2022    Procedure: INSERTION-CATHETER-JOSEPH;  Surgeon: Dionte Gan MD;  Location: [facility] OR;  Service: General;  Laterality: Left;    PLACEMENT OF DUAL-LUMEN VASCULAR CATHETER Right 07/26/2022    Procedure: INSERTION-CATHETER-Hemosplit;  Surgeon: Dionte Gan MD;  Location: [facility] OR;  Service: General;  Laterality: Right;     Social History     Tobacco Use    Smoking status: Never    Smokeless tobacco: Never   Substance Use Topics    Alcohol use: Not Currently    Drug use: No        REVIEW OF SYSTEMS    As mentioned in HPI    OBJECTIVE     VITAL SIGNS (Most Recent)  Temp: 97.9 °F (36.6 °C) (07/18/24 0741)  Pulse: 64 (07/18/24 0741)  Resp: 18 (07/18/24 0741)  BP: (!) 94/56 (07/18/24 0741)  SpO2: 97 % (07/18/24 0741)    VENTILATION STATUS  Resp: 18 (07/18/24 0741)  SpO2: 97 % (07/18/24 0741)           I & O (Last 24H):  Intake/Output Summary (Last 24 hours) at 7/18/2024 0821  Last data filed at 7/18/2024 0547  Gross per 24 hour   Intake 1460 ml   Output 4500 ml   Net -3040 ml       WEIGHTS  Wt Readings from Last 3 Encounters:   07/16/24 2247 65 kg (143 lb 4.8 oz)   07/16/24 1440 53.1 kg (117 lb)   07/17/24 1154 64.9 kg (143 lb)   06/22/24 1107 52.6 kg (116 lb)       PHYSICAL EXAM    CONSTITUTIONAL: NAD, middle-aged  female  HEENT: Normocephalic. No pallor  NECK: + JVD  LUNGS:  Coarse  HEART: regular rate and rhythm, S1, S2 normal, + murmur   ABDOMEN: soft, non-tender, bowel sounds normal  EXTREMITIES: No edema  SKIN: No rash  NEURO: AAO X 3  PSYCH: normal affect     HOME MEDICATIONS:  No current facility-administered medications on file prior to encounter.     Current Outpatient Medications on File Prior to Encounter   Medication Sig Dispense Refill    acetaZOLAMIDE (DIAMOX) 250 MG tablet take 1 tablet by mouth 3 times a day (Patient taking differently: Take 250 mg by mouth 3 (three) times daily.) 90 tablet 3    albuterol  (VENTOLIN HFA) 90 mcg/actuation inhaler Inhale 2 puffs every 4 hours by inhalation route. (Patient taking differently: Inhale 2 puffs into the lungs every 4 (four) hours as needed for Shortness of Breath or Wheezing.) 8 g 2    amLODIPine (NORVASC) 5 MG tablet Take 1 tablet (5 mg total) by mouth once daily. 30 tablet 11    apixaban (ELIQUIS) 5 mg Tab Take 1 tablet (5 mg total) by mouth 2 (two) times daily. Patient w/ thrombocytopenia <50, so held during admission, follow-up with hematologist about restarting 180 tablet 1    atropine 1% (ISOPTO ATROPINE) 1 % Drop Place 1 drop into the left eye 2 (two) times a day. 15 mL 3    blood sugar diagnostic (TRUE METRIX GLUCOSE TEST STRIP) Strp Use 1 strip to check blood glucose three times daily 100 each 11    blood-glucose meter (TRUE METRIX GLUCOSE METER) Misc Use to check blood glucose 1 each 0    blood-glucose meter,continuous (DEXCOM ) Misc USE WITH SENSORS 1 each 0    blood-glucose sensor Heather CHANGE EVERY 10 DAYS 3 each 0    brimonidine 0.15 % OPTH DROP (ALPHAGAN) 0.15 % ophthalmic solution Place 1 drop into both eyes 3 (three) times daily. 15 mL 3    brinzolamide (AZOPT) 1 % ophthalmic suspension Place1 drop in left eye 3 times a day for 30 days (Patient taking differently: Place 1 drop into both eyes 3 (three) times daily.) 15 mL 6    busPIRone (BUSPAR) 10 MG tablet Take 1 tablet (10 mg total) by mouth 3 (three) times daily as needed (anxiety). 60 tablet 5    carvediloL (COREG) 25 MG tablet take 1 tablet Oral 2 times daily (Patient taking differently: Take 25 mg by mouth 2 (two) times daily.) 60 tablet 0    cetirizine (ZYRTEC) 10 MG tablet Take 1 tablet every day by oral route. (Patient taking differently: Take 10 mg by mouth once daily.) 30 tablet 0    dorzolamide-timolol 2-0.5% (COSOPT) 22.3-6.8 mg/mL ophthalmic solution place 1 drop in left eye twice a day  for 30 days (Patient taking differently: Place 1 drop into both eyes 2 (two) times daily.) 10 mL 0     FLUoxetine 20 MG capsule Take 1 capsule every day by oral route for 90 days. (Patient taking differently: Take 20 mg by mouth once daily.) 90 capsule 1    FLUoxetine 40 MG capsule Take 1 capsule every day by oral route. (Patient taking differently: Take 40 mg by mouth once daily.) 30 capsule 2    fluticasone propionate (FLONASE ALLERGY RELIEF) 50 mcg/actuation nasal spray [place] 1 spray every day by intranasal route. (Patient taking differently: 1 spray by Each Nostril route once daily.) 16 g 2    gabapentin (NEURONTIN) 300 MG capsule Take 2 capsules twice a day by oral route for 90 days. (Patient taking differently: Take 600 mg by mouth 2 (two) times daily.) 360 capsule 1    hydrALAZINE (APRESOLINE) 25 MG tablet take 1 tablet by mouth every 8 hours (Patient taking differently: Take 25 mg by mouth every 8 (eight) hours.) 90 tablet 0    insulin detemir U-100, Levemir, (LEVEMIR FLEXTOUCH U100 INSULIN) 100 unit/mL (3 mL) InPn pen Inject 22 Units into the skin every evening. Patient not needing insulin in-patient. Follow-up with PCP for hospital follow-up and restarting the medication. Or restart medication if glucose >200 consistently resume home insulin regimen. Or if glucose is persistently less than 100, decrease by 5 units until following up with PCP 15 mL 1    insulin glargine U-100, Lantus, 100 unit/mL injection inject 13 unit subcutaneously 2 times daily (Patient taking differently: Inject 13 Units into the skin 2 (two) times a day.) 10 mL 0    isosorbide mononitrate (IMDUR) 30 MG 24 hr tablet Take 1 tablet (30 mg total) by mouth once daily. 90 tablet 1    lancets (TRUEPLUS LANCETS) 33 gauge Misc Use 1 to check blood glucose three times daily 100 each 11    lancets 33 gauge Misc Use to test twice daily 100 each 5    levETIRAcetam (KEPPRA) 500 MG Tab Take 1 tablet (500 mg total) by mouth 2 (two) times daily. 60 tablet 11    LORazepam (ATIVAN) 0.5 MG tablet Take 1 tablet 3 times a day by oral route for 7 days, for  "severe panic. (Patient taking differently: Take 0.5 mg by mouth 3 (three) times daily.) 21 tablet 2    ondansetron (ZOFRAN-ODT) 4 MG TbDL Place 1 tablet (4 mg) on or under the tongue every 8 hours for 10 days. (Patient taking differently: Take 8 mg by mouth every 8 (eight) hours as needed.) 24 tablet 2    oxyCODONE-acetaminophen (PERCOCET)  mg per tablet Take 1 tablet by mouth every 4 (four) hours as needed for Pain. 42 tablet 0    pantoprazole (PROTONIX) 40 MG tablet Take 1 tablet (40 mg total) by mouth once daily. 30 tablet 0    pen needle, diabetic 31 gauge x 3/16" Ndle Use as directed with insulin once daily 100 each 0    prednisoLONE acetate (PRED FORTE) 1 % DrpS Place 1 drop into the left eye 2 (two) times daily. 5 mL 3    predniSONE (DELTASONE) 20 MG tablet take 3 tablets Oral Daily,x30 day(s) (Patient taking differently: Take 20 mg by mouth once daily.) 90 tablet 0    sevelamer carbonate (RENVELA) 800 mg Tab Take 1 tablet (800 mg total) by mouth 3 (three) times daily with meals. 180 tablet 11    sucralfate (CARAFATE) 100 mg/mL suspension Take 10 mL 4 times a day by oral route before meals for 30 days. (Patient taking differently: Take 10 mLs by mouth 4 (four) times daily with meals and nightly.) 1200 mL 1    timolol maleate 0.5% (TIMOPTIC) 0.5 % Drop Place 1 drop in left eye 2 times a day for 30 days (Patient taking differently: Place 1 drop into both eyes 2 (two) times daily.) 5 mL 6    tobramycin-dexAMETHasone 0.3-0.1% (TOBRADEX) 0.3-0.1 % DrpS 1 drop Eye-Left every 6 hours for 5 day(s) 5 mL 0    [DISCONTINUED] atenoloL (TENORMIN) 50 MG tablet Take 1 tablet (50 mg total) by mouth every other day. 45 tablet 3    [DISCONTINUED] omeprazole (PRILOSEC) 20 MG capsule Take 2 capsules (40 mg total) by mouth once daily. for 10 days 20 capsule 0       SCHEDULED MEDS:   amLODIPine  5 mg Oral Daily    apixaban  5 mg Oral BID    carvediloL  25 mg Oral BID    FLUoxetine  20 mg Oral Daily    fluticasone propionate  " 1 spray Each Nostril Daily    gabapentin  200 mg Oral BID    hydrALAZINE  25 mg Oral Q8H    insulin glargine U-100  22 Units Subcutaneous Daily    isosorbide mononitrate  30 mg Oral Daily    levETIRAcetam  500 mg Oral BID    pantoprazole  40 mg Oral Before breakfast    sevelamer carbonate  800 mg Oral TID WM       CONTINUOUS INFUSIONS:    PRN MEDS:  Current Facility-Administered Medications:     acetaminophen, 650 mg, Oral, Q8H PRN    acetaminophen, 650 mg, Oral, Q4H PRN    albuterol sulfate, 2.5 mg, Nebulization, Q4H PRN    aluminum-magnesium hydroxide-simethicone, 30 mL, Oral, QID PRN    dextrose 50%, 12.5 g, Intravenous, PRN    dextrose 50%, 25 g, Intravenous, PRN    glucagon (human recombinant), 1 mg, Intramuscular, PRN    glucose, 16 g, Oral, PRN    glucose, 24 g, Oral, PRN    HYDROcodone-acetaminophen, 1 tablet, Oral, Q4H PRN    insulin aspart U-100, 0-5 Units, Subcutaneous, QID (AC + HS) PRN    magnesium oxide, 800 mg, Oral, PRN    magnesium oxide, 800 mg, Oral, PRN    melatonin, 6 mg, Oral, Nightly PRN    naloxone, 0.02 mg, Intravenous, PRN    ondansetron, 4 mg, Intravenous, Q6H PRN    potassium bicarbonate, 35 mEq, Oral, PRN    potassium bicarbonate, 50 mEq, Oral, PRN    potassium bicarbonate, 60 mEq, Oral, PRN    potassium, sodium phosphates, 2 packet, Oral, PRN    potassium, sodium phosphates, 2 packet, Oral, PRN    potassium, sodium phosphates, 2 packet, Oral, PRN    sodium chloride 0.9%, 10 mL, Intravenous, Q12H PRN    LABS AND DIAGNOSTICS     CBC LAST 3 DAYS  Recent Labs   Lab 07/16/24  1610 07/17/24  0620 07/18/24  0444   WBC 8.60 5.61 3.69*   RBC 2.61* 2.87* 2.53*   HGB 7.8* 8.5* 7.5*   HCT 23.8* 26.9* 23.1*   MCV 91 94 91   MCH 29.9 29.6 29.6   MCHC 32.8 31.6* 32.5   RDW 18.5* 18.3* 17.9*   PLT 71* 66* 62*   MPV 10.2 10.5 11.3   GRAN 91.2*  7.8* 85.5*  4.8 77.0*  2.8   LYMPH 2.2*  0.2* 8.4*  0.5* 12.7*  0.5*   MONO 5.7  0.5 5.2  0.3 8.1  0.3   BASO 0.01 0.00 0.01   NRBC 0 0 0  "      COAGULATION LAST 3 DAYS  Recent Labs   Lab 07/16/24  1610   INR 1.0       CHEMISTRY LAST 3 DAYS  Recent Labs   Lab 07/16/24  1610 07/17/24  0620 07/18/24  0444    135* 136   K 4.2 4.4 5.2*    97 98   CO2 27 24 25   ANIONGAP 11 14 13   BUN 52* 57* 65*   CREATININE 6.0* 6.4* 7.2*   * 390* 42*   CALCIUM 8.0* 7.7* 7.9*   MG 2.1  2.1 2.1 2.2   ALBUMIN 3.5 3.3* 3.4*   PROT 5.6* 5.6* 5.5*   ALKPHOS 79 87 114   ALT 32 49* 82*   AST 28 41* 127*   BILITOT 1.2* 1.0 1.9*       CARDIAC PROFILE LAST 3 DAYS  Recent Labs   Lab 07/16/24  1610 07/16/24  1809 07/16/24  2327 07/17/24  0620   BNP 1,875*  --   --   --      --   --   --    TROPONINIHS 86.7* 81.5* 72.8* 62.3*       ENDOCRINE LAST 3 DAYS  Recent Labs   Lab 07/16/24  1610   TSH 1.355       LAST ARTERIAL BLOOD GAS  ABG  No results for input(s): "PH", "PO2", "PCO2", "HCO3", "BE" in the last 168 hours.    LAST 7 DAYS MICROBIOLOGY   Microbiology Results (last 7 days)       Procedure Component Value Units Date/Time    Blood culture #1 **CANNOT BE ORDERED STAT** [[id number]] Collected: 07/16/24 1632    Order Status: Completed Specimen: Blood Updated: 07/17/24 1832     Blood Culture, Routine No Growth to date      No Growth to date    Blood culture #2 **CANNOT BE ORDERED STAT** [[id number]] Collected: 07/16/24 1703    Order Status: Completed Specimen: Blood Updated: 07/17/24 1832     Blood Culture, Routine No Growth to date      No Growth to date    Group A Strep, Molecular [[id number]] Collected: 07/16/24 1824    Order Status: Completed Specimen: Throat Updated: 07/16/24 1848     Group A Strep, Molecular Negative     Comment: Arcanobacterium haemolyticum and Beta Streptococcus group C   and G will not be detected by this test method.  Please order   Throat Culture ([id number]) if suspected.         Respiratory Infection Panel (PCR), Nasopharyngeal [[id number]] Collected: 07/16/24 4227    Order Status: Completed Specimen: Nasopharyngeal Swab Updated: " 07/16/24 1825     Respiratory Infection Panel Source NP swab    Narrative:      Respiratory Infection Panel source->NP Swab            MOST RECENT IMAGING  Echo    Left Ventricle: The left ventricle is normal in size. Increased wall   thickness. There is concentric hypertrophy. There is normal systolic   function with a visually estimated ejection fraction of 55 - 60%. Grade   III diastolic dysfunction. E/A ratio is 2.22.    Right Ventricle: Right ventricular enlargement. Systolic function is   normal.    Right Atrium: Right atrium is moderately dilated.    Aortic Valve: The aortic valve is a trileaflet valve.    Tricuspid Valve: There is moderate regurgitation. There is moderate   pulmonary hypertension. The estimated PA systolic pressure is at least 50   mmHg.    Pulmonic Valve: There is mild to moderate regurgitation.    Pericardium: There is a small circumferential effusion. Pericardial   effusion is echolucent. Indication of cardiac tamponade. Earlt Evidence   includes greater than 40% tricuspid and mitral transvalvular velocity   variation during respiration.suggest short term followup ECHO      ECHOCARDIOGRAM RESULTS (last 5)  Results for orders placed during the hospital encounter of 07/16/24    Echo    Interpretation Summary    Left Ventricle: The left ventricle is normal in size. Increased wall thickness. There is concentric hypertrophy. There is normal systolic function with a visually estimated ejection fraction of 55 - 60%. Grade III diastolic dysfunction. E/A ratio is 2.22.    Right Ventricle: Right ventricular enlargement. Systolic function is normal.    Right Atrium: Right atrium is moderately dilated.    Aortic Valve: The aortic valve is a trileaflet valve.    Tricuspid Valve: There is moderate regurgitation. There is moderate pulmonary hypertension. The estimated PA systolic pressure is at least 50 mmHg.    Pulmonic Valve: There is mild to moderate regurgitation.    Pericardium: There is a small  circumferential effusion. Pericardial effusion is echolucent. Indication of cardiac tamponade. Earlt Evidence includes greater than 40% tricuspid and mitral transvalvular velocity variation during respiration.suggest short term followup ECHO      Results for orders placed during the hospital encounter of 09/10/23    Echo    Interpretation Summary    Left Ventricle: The left ventricle is normal in size. Mildly increased ventricular mass. Mildly increased wall thickness. Normal wall motion. There is low normal systolic function with a visually estimated ejection fraction of 50 - 55%. There is normal diastolic function.    Right Ventricle: Normal right ventricular cavity size. Wall thickness is normal. Right ventricle wall motion  is normal. Systolic function is normal.    IVC/SVC: Normal venous pressure at 3 mmHg.    Pericardium: There is a small circumferential effusion. Pericardial effusion is echolucent. No indication of cardiac tamponade. Evidence includes no chamber collapse.      Results for orders placed during the hospital encounter of 05/15/23    Echo    Interpretation Summary  · The left ventricle is normal in size with moderate concentric hypertrophy and mildly decreased systolic function.  · The estimated ejection fraction is 47%.  · Left ventricular diastolic dysfunction.  · There is mild left ventricular global hypokinesis.  · Normal right ventricular size with normal right ventricular systolic function.  · Small circumferential with predominance of posterolateral pericardial effusion. Posterior effusion measuring 1.18 cm.      Results for orders placed during the hospital encounter of 04/22/23    Echo    Interpretation Summary  · The left ventricle is normal in size with moderate concentric hypertrophy and low normal systolic function.  · The estimated ejection fraction is 50%.  · Normal right ventricular size with normal right ventricular systolic function.  · Intermediate central venous pressure (8  mmHg).  · Small circumferential pericardial effusion. Effusion is fluid.  · Patient has small-to-moderate pericardial effusion anteriorly subcostally it is approximally 1.7 cm  · There is no evidence of constriction or tamponade  · No ventricular interdependence  · Posterior fusion smaller than the anterior      Results for orders placed during the hospital encounter of 01/12/23    Echo    Interpretation Summary  · The left ventricle is normal in size with moderate concentric hypertrophy and normal systolic function.  · The estimated ejection fraction is 55%.  · Grade III left ventricular diastolic dysfunction.  · Normal right ventricular size with normal right ventricular systolic function.  · Moderate left atrial enlargement.  · Moderate to severe tricuspid regurgitation.  · Mild to moderate pulmonic regurgitation.  · Mild mitral regurgitation.  · Normal central venous pressure (3 mmHg).  · The estimated PA systolic pressure is 56 mmHg.  · There is pulmonary hypertension.  · Moderate to large circumferential pericardial effusion. No evidence of tamponade.      CURRENT/PREVIOUS VISIT EKG  Results for orders placed or performed during the hospital encounter of 07/16/24   EKG 12-lead    Collection Time: 07/17/24  9:52 AM   Result Value Ref Range    QRS Duration 78 ms    [facility] QTC Calculation 462 ms    Narrative    Test Reason : R07.9,    Vent. Rate : 087 BPM     Atrial Rate : 087 BPM     P-R Int : 186 ms          QRS Dur : 078 ms      QT Int : 384 ms       P-R-T Axes : 037 -31 056 degrees     QTc Int : 462 ms    Normal sinus rhythm  Left axis deviation  Possible Anterior infarct (cited on or before 16-JUL-2024)  Abnormal ECG  When compared with ECG of 16-JUL-2024 16:33,  No significant change was found    Referred By: [email or web address]   SELF           Confirmed By:            ASSESSMENT/PLAN:     Active Hospital Problems    Diagnosis    *Abdominal pain    Seizures    MDD (major depressive disorder)    Hypertension    Deep  vein thrombosis (DVT) of non-extremity vein    ESRD (end stage renal disease)    Diabetes    Pericardial effusion       ASSESSMENT & PLAN:     Pericardial effusion   Abdominal pain  Seizures  MDD  HTN  DVT  ESRD on dialysis   T2DM      RECOMMENDATIONS:    Continue with dialysis per Nephrology.  She will need a repeat echo before DC to re-evaluate effusion.  If the effusion has not improved at that time we will possibly need a pericardial window.  Continue with Eliquis 5 mg b.i.d. as well as carvedilol 25 mg b.i.d. for Afib.  Could consider DC amlodipine for hypotension.  Hold hydralazine for systolic of less than 110.  We will continue to follow at this time, thank you for this consult.      Stacie Morley NP  Department of Cardiology  Date of Service: 07/18/2024        I have personally interviewed and examined the patient, I have reviewed the Nurse Practitioner's history and physical, assessment, and plan. I agree with the findings and plan.    The patient has mild evidence of tamponade I believe secondary to missing 8 dialysis sessions.  I would not give any blood pressure medications if her blood pressure is soft because of the tamponade.  Discussed nephrology repeat echo after she receives therefore dialysis to remove the pericardial effusion.    Dustin Taylor M.D.  Department of Cardiology  Date of Service: 07/18/2024  8:24 AM

## 2024-07-18 NOTE — PROGRESS NOTES
[facility]  Progress Note    Patient Name: Tabby Howard  MRN: [id number]  Patient Class: OP- Observation   Admission Date: 7/16/2024  Length of Stay: 0 days  Attending Physician: Alice Fish MD  Primary Care Provider: Melony Kim NP        Subjective:     Principal Problem:Abdominal pain        HPI:  This is a 34-year-old female with end-stage kidney disease and on dialysis presented to the hospital after not feeling well during dialysis.  In the middle of dialysis the patient had abdominal discomfort and complained of diarrhea.  Dialysis was stopped and the patient was sent to the hospital.  Workup included CT of the abdomen and pelvis which resulted in diffuse body wall edema, mild circumferential wall thickening of the distal descending colon, sigmoid colon and rectum.  There was also circumferential bladder wall thickening.  This process could correlate with infectious/inflammatory process.  Patient was given antibiotics empirically in the ER.  Patient has chronic pain and complaints of abdominal pain.  No major fever or chills.  Nephrology was informed and they would like the patient to be admitted so that she complete her dialysis tomorrow.    Overview/Hospital Course:  The patient was admitted and placed on prn pain meds. Her presentation was similar to past presentations of chronic abdominal pain. It was thought the findings on abdominal and pelvic CT were most consistent with fluid overload due to missed dialysis sessions. Nephrology  was consulted and dialysis orders were placed. Echo was ordered to evaluate pericardial effusion seen on CT, and revealed small effusion with tamponade. Cardiology consulted.    Interval History: c/o abdominal pain    Review of Systems   Constitutional:  Negative for activity change, appetite change, chills and fever.   HENT:  Negative for congestion, ear pain, nosebleeds and sinus pain.    Eyes:  Negative for discharge and  itching.   Respiratory:  Negative for apnea, cough, chest tightness and shortness of breath.    Cardiovascular:  Negative for chest pain, palpitations and leg swelling.   Gastrointestinal:  Positive for abdominal distention and abdominal pain. Negative for vomiting.   Genitourinary:  Negative for difficulty urinating, dysuria, flank pain and frequency.   Musculoskeletal:  Negative for arthralgias, back pain, joint swelling and myalgias.   Skin:  Negative for color change, pallor and rash.   Neurological:  Negative for dizziness, weakness, light-headedness and headaches.   Psychiatric/Behavioral:  Negative for agitation, behavioral problems, confusion and suicidal ideas.      Objective:     Vital Signs (Most Recent):  Temp: 97.4 °F (36.3 °C) (07/18/24 1020)  Pulse: 72 (07/18/24 1200)  Resp: 20 (07/18/24 1020)  BP: 97/64 (07/18/24 1200)  SpO2: 98 % (07/18/24 1020) Vital Signs (24h Range):  Temp:  [97.4 °F (36.3 °C)-99 °F (37.2 °C)] 97.4 °F (36.3 °C)  Pulse:  [64-85] 72  Resp:  [16-20] 20  SpO2:  [93 %-99 %] 98 %  BP: ()/(45-86) 97/64     Weight: 65 kg (143 lb 4.8 oz)  Body mass index is 26.21 kg/m².    Intake/Output Summary (Last 24 hours) at 7/18/2024 1219  Last data filed at 7/18/2024 0846  Gross per 24 hour   Intake 1440 ml   Output 4500 ml   Net -3060 ml         Physical Exam  Vitals reviewed.   Constitutional:       General: She is not in acute distress.     Appearance: Normal appearance. She is normal weight. She is not ill-appearing.   HENT:      Head: Normocephalic and atraumatic.      Nose: Nose normal.      Mouth/Throat:      Mouth: Mucous membranes are moist.      Pharynx: Oropharynx is clear.   Eyes:      General: No scleral icterus.     Extraocular Movements: Extraocular movements intact.      Conjunctiva/sclera: Conjunctivae normal.      Pupils: Pupils are equal, round, and reactive to light.   Cardiovascular:      Rate and Rhythm: Normal rate and regular rhythm.      Pulses: Normal pulses.       Heart sounds: Normal heart sounds. No murmur heard.     No gallop.   Pulmonary:      Effort: Pulmonary effort is normal. No respiratory distress.      Breath sounds: Normal breath sounds. No wheezing, rhonchi or rales.   Chest:      Chest wall: No tenderness.   Abdominal:      General: Abdomen is flat. There is distension.      Palpations: Abdomen is soft.      Tenderness: There is abdominal tenderness.   Musculoskeletal:         General: No swelling or tenderness.      Cervical back: Normal range of motion and neck supple. No rigidity or tenderness.      Right lower leg: No edema.      Left lower leg: No edema.   Skin:     General: Skin is warm and dry.   Neurological:      General: No focal deficit present.      Mental Status: She is alert and oriented to person, place, and time. Mental status is at baseline.      Motor: No weakness.   Psychiatric:         Mood and Affect: Mood normal.         Behavior: Behavior normal.         Thought Content: Thought content normal.             Significant Labs: All pertinent labs within the past 24 hours have been reviewed.    Significant Imaging: I have reviewed all pertinent imaging results/findings within the past 24 hours.    Assessment/Plan:      * Abdominal pain  - It is difficult to determine if the patient has colitis or any inflammatory process in the abdomen due to persistent and chronic generalized pain.  Also, her edema noted in the CT scan could likely be due to volume overload and not completing her dialysis.  - Patient received a dose of antibiotics in the ER.  - Currently with holding further antibiotics  - Follow lab   Monitor    Seizures    Continue Lani    MDD (major depressive disorder)  Fluoxetine 20 mg daily    Hypertension  Amlodipine 5 mg daily   Carvedilol 25 mg p.o. b.i.d.   Hydralazine 25 mg t.i.d.  isosorbide mononitrate 30 mg daily    Deep vein thrombosis (DVT) of non-extremity vein  Continue apixaban      ESRD (end stage renal  disease)  Nephrology consulted   Sevelamer    Pericardial effusion  CT of the chest showed moderate to severe pericardial effusion.  Echo revealed small effusion with tamponade. Cardiology consulted.    Diabetes  Lantus 22 units daily   Insulin sliding scale      VTE Risk Mitigation (From admission, onward)           Ordered     apixaban tablet 5 mg  2 times daily         07/17/24 1424     Reason for No Pharmacological VTE Prophylaxis  Once        Question:  Reasons:  Answer:  Already adequately anticoagulated on oral Anticoagulants    07/16/24 2030     IP VTE HIGH RISK PATIENT  Once         07/16/24 2030     Place sequential compression device  Until discontinued         07/16/24 2030                    Discharge Planning   TATIANA: 7/19/2024     Code Status: Full Code   Is the patient medically ready for discharge?:     Reason for patient still in hospital (select all that apply): Patient trending condition  Discharge Plan A: Home with family                  Alice Fish MD, MD  Department of Hospital Medicine   [facility]

## 2024-07-18 NOTE — NURSING
Latest Reference Range & Units 07/18/24 02:13   POC Glucose 70 - 110  43 (LL)   (LL): Data is critically low    instructed pt to eat a snack and will reassess. Haydee SPRINGER made aware

## 2024-07-18 NOTE — ASSESSMENT & PLAN NOTE
CT of the chest showed moderate to severe pericardial effusion.  Echo revealed small effusion with tamponade. Cardiology consulted.

## 2024-07-18 NOTE — PROGRESS NOTES
INPATIENT NEPHROLOGY progress  [facility]    Patient Name: Tabby Howard  Date: 07/18/2024    Reason for consultation: ESRD    Chief Complaint:   Chief Complaint   Patient presents with    Abdominal Pain     Patient was at dialysis and complained of lower back pain, did not finish  only took 3 kg at dialysis.  Has missed about 8 appointments in the last 6 weeks of dialysis. CBG was 58, D10 given.  SpO2 did drop from 97 to 79% on room.  Patient is on 3 liters.         History of Present Illness:  This is a 34-year-old female with end-stage kidney disease and on dialysis presented to the hospital after not feeling well during dialysis. In the middle of dialysis the patient had abdominal discomfort and complained of diarrhea. Dialysis was stopped and the patient was sent to the hospital. She got 2L off. She was 12L above dry weight. Workup included CT of the abdomen and pelvis which resulted in diffuse body wall edema, mild circumferential wall thickening of the distal descending colon, sigmoid colon and rectum. There was also circumferential bladder wall thickening. This process could correlate with infectious/inflammatory process. Patient was given antibiotics empirically in the ER. Patient has chronic pain and complaints of abdominal pain. No major fever or chills. Consulted for dialysis.    Interval History:  7/17- IUF today, HD/UF MWF  7/18  had IUF only yesterday, K+ a little elevated, will run regular tx today.  Pt starting to feel better w/fluid off.  Hgb dropping, ordered epo w/HD on MWF.    Plan of Care:    Assessment:  ESRD on HD MWF  HTN/Hypervolemia  SHPT  Anemia of CKD    Plan:    - ordered HD MWF  - resume home BP meds  - ordered daily UF- 3-4L  - renal diet, 1.5L fluid restriction  - resume binder with meals  - ordered [facility] with HD    Thank you for allowing us to participate in this patient's care. We will continue to follow.    Vital Signs:  Temp Readings from Last 3 Encounters:    24 97.9 °F (36.6 °C) (Oral)   24 98.2 °F (36.8 °C) (Oral)   24 98.3 °F (36.8 °C) (Oral)       Pulse Readings from Last 3 Encounters:   24 64   24 90   24 75       BP Readings from Last 3 Encounters:   24 (!) 94/56   24 (!) 169/85   24 (!) 149/79       Weight:  Wt Readings from Last 3 Encounters:   24 65 kg (143 lb 4.8 oz)   24 64.9 kg (143 lb)   24 52.6 kg (116 lb)       Past Medical & Surgical History:  Past Medical History:   Diagnosis Date    ESRD on hemodialysis 2022    Gastritis 2022    EGD was 22    Gastroparesis 2022    has not had Emptying study    Heart failure with preserved ejection fraction 2022    EF 55% on 3/22    History of supraventricular tachycardia     Hyperkalemia 2022    Hypertensive emergency 2022    Sickle cell trait 2022    Type 2 diabetes mellitus        Past Surgical History:   Procedure Laterality Date     SECTION      x 3    COLONOSCOPY      COLONOSCOPY N/A 2022    Procedure: COLONOSCOPY;  Surgeon: Jagdeep Cedeno MD;  Location: [facility];  Service: Endoscopy;  Laterality: N/A;    DESTRUCTION, CILIARY BODY, USING LASER Left 3/8/2024    Procedure: Cyclophotocoagulation G-probe, retrobulbar chlorpromazine left eye;  Surgeon: Fidel Wise MD;  Location: [address];  Service: Ophthalmology;  Laterality: Left;    ENDOSCOPIC ULTRASOUND OF UPPER GASTROINTESTINAL TRACT N/A 2023    Procedure: ULTRASOUND, UPPER GI TRACT, ENDOSCOPIC;  Surgeon: Micky Paredes III, MD;  Location: [facility];  Service: Endoscopy;  Laterality: N/A;    ESOPHAGOGASTRODUODENOSCOPY N/A 10/18/2019    Procedure: ESOPHAGOGASTRODUODENOSCOPY (EGD);  Surgeon: Gianluca Mendez MD;  Location: [facility];  Service: Endoscopy;  Laterality: N/A;    ESOPHAGOGASTRODUODENOSCOPY N/A 2022    Procedure: EGD (ESOPHAGOGASTRODUODENOSCOPY);  Surgeon: Micky Paredes III, MD;  Location:  [facility];  Service: Endoscopy;  Laterality: N/A;    ESOPHAGOGASTRODUODENOSCOPY N/A 12/5/2022    Procedure: EGD (ESOPHAGOGASTRODUODENOSCOPY);  Surgeon: Marcelo Zhong MD;  Location: [facility];  Service: Endoscopy;  Laterality: N/A;    INSERTION, CATHETER, TUNNELED N/A 6/17/2023    Procedure: Insertion,catheter,tunneled;  Surgeon: Carlos Thumran Jr., MD;  Location: [facility] OR;  Service: General;  Laterality: N/A;    LAPAROSCOPIC CHOLECYSTECTOMY N/A 07/30/2022    Procedure: CHOLECYSTECTOMY, LAPAROSCOPIC;  Surgeon: Grey Perez MD;  Location: [facility] OR;  Service: General;  Laterality: N/A;    PLACEMENT OF DUAL-LUMEN VASCULAR CATHETER Left 07/12/2022    Procedure: INSERTION-CATHETER-JOSEPH;  Surgeon: Dionte Gan MD;  Location: [facility] OR;  Service: General;  Laterality: Left;    PLACEMENT OF DUAL-LUMEN VASCULAR CATHETER Right 07/26/2022    Procedure: INSERTION-CATHETER-Hemosplit;  Surgeon: Dionte Gan MD;  Location: [facility] OR;  Service: General;  Laterality: Right;       Past Social History:  Social History     Socioeconomic History    Marital status:    Tobacco Use    Smoking status: Never    Smokeless tobacco: Never   Substance and Sexual Activity    Alcohol use: Not Currently    Drug use: No    Sexual activity: Not Currently     Partners: Male     Birth control/protection: I.U.D.     Social Determinants of Health     Financial Resource Strain: Low Risk  (2/28/2024)    Overall Financial Resource Strain (CARDIA)     Difficulty of Paying Living Expenses: Not very hard   Food Insecurity: No Food Insecurity (2/28/2024)    Hunger Vital Sign     Worried About Running Out of Food in the Last Year: Never true     Ran Out of Food in the Last Year: Never true   Transportation Needs: No Transportation Needs (2/28/2024)    PRAPARE - Transportation     Lack of Transportation (Medical): No     Lack of Transportation (Non-Medical): No   Physical Activity: Insufficiently Active (2/28/2024)    Exercise Vital Sign      Days of Exercise per Week: 1 day     Minutes of Exercise per Session: 10 min   Stress: No Stress Concern Present (2/28/2024)    [employer] - Occupational Stress Questionnaire     Feeling of Stress : Only a little   Housing Stability: Low Risk  (2/28/2024)    Housing Stability Vital Sign     Unable to Pay for Housing in the Last Year: No     Number of Places Lived in the Last Year: 2     Unstable Housing in the Last Year: No       Medications:  Scheduled Meds:   amLODIPine  5 mg Oral Daily    apixaban  5 mg Oral BID    carvediloL  25 mg Oral BID    FLUoxetine  20 mg Oral Daily    fluticasone propionate  1 spray Each Nostril Daily    gabapentin  200 mg Oral BID    hydrALAZINE  25 mg Oral Q8H    insulin glargine U-100  22 Units Subcutaneous Daily    isosorbide mononitrate  30 mg Oral Daily    levETIRAcetam  500 mg Oral BID    pantoprazole  40 mg Oral Before breakfast    sevelamer carbonate  800 mg Oral TID WM     Continuous Infusions:  PRN Meds:.  Current Facility-Administered Medications:     acetaminophen, 650 mg, Oral, Q8H PRN    acetaminophen, 650 mg, Oral, Q4H PRN    albuterol sulfate, 2.5 mg, Nebulization, Q4H PRN    aluminum-magnesium hydroxide-simethicone, 30 mL, Oral, QID PRN    dextrose 50%, 12.5 g, Intravenous, PRN    dextrose 50%, 25 g, Intravenous, PRN    glucagon (human recombinant), 1 mg, Intramuscular, PRN    glucose, 16 g, Oral, PRN    glucose, 24 g, Oral, PRN    HYDROcodone-acetaminophen, 1 tablet, Oral, Q4H PRN    insulin aspart U-100, 0-5 Units, Subcutaneous, QID (AC + HS) PRN    magnesium oxide, 800 mg, Oral, PRN    magnesium oxide, 800 mg, Oral, PRN    melatonin, 6 mg, Oral, Nightly PRN    naloxone, 0.02 mg, Intravenous, PRN    ondansetron, 4 mg, Intravenous, Q6H PRN    potassium bicarbonate, 35 mEq, Oral, PRN    potassium bicarbonate, 50 mEq, Oral, PRN    potassium bicarbonate, 60 mEq, Oral, PRN    potassium, sodium phosphates, 2 packet, Oral, PRN    potassium,  sodium phosphates, 2 packet, Oral, PRN    potassium, sodium phosphates, 2 packet, Oral, PRN    sodium chloride 0.9%, 10 mL, Intravenous, Q12H PRN  No current facility-administered medications on file prior to encounter.     Current Outpatient Medications on File Prior to Encounter   Medication Sig Dispense Refill    acetaZOLAMIDE (DIAMOX) 250 MG tablet take 1 tablet by mouth 3 times a day (Patient taking differently: Take 250 mg by mouth 3 (three) times daily.) 90 tablet 3    albuterol (VENTOLIN HFA) 90 mcg/actuation inhaler Inhale 2 puffs every 4 hours by inhalation route. (Patient taking differently: Inhale 2 puffs into the lungs every 4 (four) hours as needed for Shortness of Breath or Wheezing.) 8 g 2    amLODIPine (NORVASC) 5 MG tablet Take 1 tablet (5 mg total) by mouth once daily. 30 tablet 11    apixaban (ELIQUIS) 5 mg Tab Take 1 tablet (5 mg total) by mouth 2 (two) times daily. Patient w/ thrombocytopenia <50, so held during admission, follow-up with hematologist about restarting 180 tablet 1    atropine 1% (ISOPTO ATROPINE) 1 % Drop Place 1 drop into the left eye 2 (two) times a day. 15 mL 3    blood sugar diagnostic (TRUE METRIX GLUCOSE TEST STRIP) Strp Use 1 strip to check blood glucose three times daily 100 each 11    blood-glucose meter (TRUE METRIX GLUCOSE METER) Misc Use to check blood glucose 1 each 0    blood-glucose meter,continuous (DEXCOM ) Misc USE WITH SENSORS 1 each 0    blood-glucose sensor Heather CHANGE EVERY 10 DAYS 3 each 0    brimonidine 0.15 % OPTH DROP (ALPHAGAN) 0.15 % ophthalmic solution Place 1 drop into both eyes 3 (three) times daily. 15 mL 3    brinzolamide (AZOPT) 1 % ophthalmic suspension Place1 drop in left eye 3 times a day for 30 days (Patient taking differently: Place 1 drop into both eyes 3 (three) times daily.) 15 mL 6    busPIRone (BUSPAR) 10 MG tablet Take 1 tablet (10 mg total) by mouth 3 (three) times daily as needed (anxiety). 60 tablet 5    carvediloL (COREG)  25 MG tablet take 1 tablet Oral 2 times daily (Patient taking differently: Take 25 mg by mouth 2 (two) times daily.) 60 tablet 0    cetirizine (ZYRTEC) 10 MG tablet Take 1 tablet every day by oral route. (Patient taking differently: Take 10 mg by mouth once daily.) 30 tablet 0    dorzolamide-timolol 2-0.5% (COSOPT) 22.3-6.8 mg/mL ophthalmic solution place 1 drop in left eye twice a day  for 30 days (Patient taking differently: Place 1 drop into both eyes 2 (two) times daily.) 10 mL 0    FLUoxetine 20 MG capsule Take 1 capsule every day by oral route for 90 days. (Patient taking differently: Take 20 mg by mouth once daily.) 90 capsule 1    FLUoxetine 40 MG capsule Take 1 capsule every day by oral route. (Patient taking differently: Take 40 mg by mouth once daily.) 30 capsule 2    fluticasone propionate (FLONASE ALLERGY RELIEF) 50 mcg/actuation nasal spray [place] 1 spray every day by intranasal route. (Patient taking differently: 1 spray by Each Nostril route once daily.) 16 g 2    gabapentin (NEURONTIN) 300 MG capsule Take 2 capsules twice a day by oral route for 90 days. (Patient taking differently: Take 600 mg by mouth 2 (two) times daily.) 360 capsule 1    hydrALAZINE (APRESOLINE) 25 MG tablet take 1 tablet by mouth every 8 hours (Patient taking differently: Take 25 mg by mouth every 8 (eight) hours.) 90 tablet 0    insulin detemir U-100, Levemir, (LEVEMIR FLEXTOUCH U100 INSULIN) 100 unit/mL (3 mL) InPn pen Inject 22 Units into the skin every evening. Patient not needing insulin in-patient. Follow-up with PCP for hospital follow-up and restarting the medication. Or restart medication if glucose >200 consistently resume home insulin regimen. Or if glucose is persistently less than 100, decrease by 5 units until following up with PCP 15 mL 1    insulin glargine U-100, Lantus, 100 unit/mL injection inject 13 unit subcutaneously 2 times daily (Patient taking differently: Inject 13 Units into the skin 2 (two) times a  "day.) 10 mL 0    isosorbide mononitrate (IMDUR) 30 MG 24 hr tablet Take 1 tablet (30 mg total) by mouth once daily. 90 tablet 1    lancets (TRUEPLUS LANCETS) 33 gauge Misc Use 1 to check blood glucose three times daily 100 each 11    lancets 33 gauge Misc Use to test twice daily 100 each 5    levETIRAcetam (KEPPRA) 500 MG Tab Take 1 tablet (500 mg total) by mouth 2 (two) times daily. 60 tablet 11    LORazepam (ATIVAN) 0.5 MG tablet Take 1 tablet 3 times a day by oral route for 7 days, for severe panic. (Patient taking differently: Take 0.5 mg by mouth 3 (three) times daily.) 21 tablet 2    ondansetron (ZOFRAN-ODT) 4 MG TbDL Place 1 tablet (4 mg) on or under the tongue every 8 hours for 10 days. (Patient taking differently: Take 8 mg by mouth every 8 (eight) hours as needed.) 24 tablet 2    oxyCODONE-acetaminophen (PERCOCET)  mg per tablet Take 1 tablet by mouth every 4 (four) hours as needed for Pain. 42 tablet 0    pantoprazole (PROTONIX) 40 MG tablet Take 1 tablet (40 mg total) by mouth once daily. 30 tablet 0    pen needle, diabetic 31 gauge x 3/16" Ndle Use as directed with insulin once daily 100 each 0    prednisoLONE acetate (PRED FORTE) 1 % DrpS Place 1 drop into the left eye 2 (two) times daily. 5 mL 3    predniSONE (DELTASONE) 20 MG tablet take 3 tablets Oral Daily,x30 day(s) (Patient taking differently: Take 20 mg by mouth once daily.) 90 tablet 0    sevelamer carbonate (RENVELA) 800 mg Tab Take 1 tablet (800 mg total) by mouth 3 (three) times daily with meals. 180 tablet 11    sucralfate (CARAFATE) 100 mg/mL suspension Take 10 mL 4 times a day by oral route before meals for 30 days. (Patient taking differently: Take 10 mLs by mouth 4 (four) times daily with meals and nightly.) 1200 mL 1    timolol maleate 0.5% (TIMOPTIC) 0.5 % Drop Place 1 drop in left eye 2 times a day for 30 days (Patient taking differently: Place 1 drop into both eyes 2 (two) times daily.) 5 mL 6    tobramycin-dexAMETHasone " 0.3-0.1% (TOBRADEX) 0.3-0.1 % DrpS 1 drop Eye-Left every 6 hours for 5 day(s) 5 mL 0    [DISCONTINUED] atenoloL (TENORMIN) 50 MG tablet Take 1 tablet (50 mg total) by mouth every other day. 45 tablet 3    [DISCONTINUED] omeprazole (PRILOSEC) 20 MG capsule Take 2 capsules (40 mg total) by mouth once daily. for 10 days 20 capsule 0       Allergies:  Droperidol and Penicillins    Past Family History:  Reviewed; refer to Hospitalist Admission Note    Review of Systems:  Not in ER- Moving floors    Physical Exam:  Not in ER    Results:  Lab Results   Component Value Date     07/18/2024    K 5.2 (H) 07/18/2024    CL 98 07/18/2024    CO2 25 07/18/2024    BUN 65 (H) 07/18/2024    CREATININE 7.2 (H) 07/18/2024    CALCIUM 7.9 (L) 07/18/2024    ANIONGAP 13 07/18/2024    ESTGFRAFRICA 19 (L) 09/03/2022    EGFRNONAA 18 (A) 07/31/2022       Lab Results   Component Value Date    CALCIUM 7.9 (L) 07/18/2024    PHOS 7.5 (H) 06/22/2024       Recent Labs   Lab 07/18/24  0444   WBC 3.69*   RBC 2.53*   HGB 7.5*   HCT 23.1*   PLT 62*   MCV 91   MCH 29.6   MCHC 32.5           Geeta Kaur NP    [facility]  [address] [place], [place] [address]JEANMARIE Lopez [address]  [phone] (p)  [phone] (f)

## 2024-07-18 NOTE — PROGRESS NOTES
Pt complaining of sternal chest pain that radiates to back with some SOB. MD Polina notified. Nitro SL & IV morphine ordered. Administered 2 SL tabs and IV Morphine. Pt reports CP relief.

## 2024-07-18 NOTE — SUBJECTIVE & OBJECTIVE
Interval History: c/o abdominal pain    Review of Systems   Constitutional:  Negative for activity change, appetite change, chills and fever.   HENT:  Negative for congestion, ear pain, nosebleeds and sinus pain.    Eyes:  Negative for discharge and itching.   Respiratory:  Negative for apnea, cough, chest tightness and shortness of breath.    Cardiovascular:  Negative for chest pain, palpitations and leg swelling.   Gastrointestinal:  Positive for abdominal distention and abdominal pain. Negative for vomiting.   Genitourinary:  Negative for difficulty urinating, dysuria, flank pain and frequency.   Musculoskeletal:  Negative for arthralgias, back pain, joint swelling and myalgias.   Skin:  Negative for color change, pallor and rash.   Neurological:  Negative for dizziness, weakness, light-headedness and headaches.   Psychiatric/Behavioral:  Negative for agitation, behavioral problems, confusion and suicidal ideas.      Objective:     Vital Signs (Most Recent):  Temp: 97.4 °F (36.3 °C) (07/18/24 1020)  Pulse: 72 (07/18/24 1200)  Resp: 20 (07/18/24 1020)  BP: 97/64 (07/18/24 1200)  SpO2: 98 % (07/18/24 1020) Vital Signs (24h Range):  Temp:  [97.4 °F (36.3 °C)-99 °F (37.2 °C)] 97.4 °F (36.3 °C)  Pulse:  [64-85] 72  Resp:  [16-20] 20  SpO2:  [93 %-99 %] 98 %  BP: ()/(45-86) 97/64     Weight: 65 kg (143 lb 4.8 oz)  Body mass index is 26.21 kg/m².    Intake/Output Summary (Last 24 hours) at 7/18/2024 1219  Last data filed at 7/18/2024 0846  Gross per 24 hour   Intake 1440 ml   Output 4500 ml   Net -3060 ml         Physical Exam  Vitals reviewed.   Constitutional:       General: She is not in acute distress.     Appearance: Normal appearance. She is normal weight. She is not ill-appearing.   HENT:      Head: Normocephalic and atraumatic.      Nose: Nose normal.      Mouth/Throat:      Mouth: Mucous membranes are moist.      Pharynx: Oropharynx is clear.   Eyes:      General: No scleral icterus.     Extraocular  Movements: Extraocular movements intact.      Conjunctiva/sclera: Conjunctivae normal.      Pupils: Pupils are equal, round, and reactive to light.   Cardiovascular:      Rate and Rhythm: Normal rate and regular rhythm.      Pulses: Normal pulses.      Heart sounds: Normal heart sounds. No murmur heard.     No gallop.   Pulmonary:      Effort: Pulmonary effort is normal. No respiratory distress.      Breath sounds: Normal breath sounds. No wheezing, rhonchi or rales.   Chest:      Chest wall: No tenderness.   Abdominal:      General: Abdomen is flat. There is distension.      Palpations: Abdomen is soft.      Tenderness: There is abdominal tenderness.   Musculoskeletal:         General: No swelling or tenderness.      Cervical back: Normal range of motion and neck supple. No rigidity or tenderness.      Right lower leg: No edema.      Left lower leg: No edema.   Skin:     General: Skin is warm and dry.   Neurological:      General: No focal deficit present.      Mental Status: She is alert and oriented to person, place, and time. Mental status is at baseline.      Motor: No weakness.   Psychiatric:         Mood and Affect: Mood normal.         Behavior: Behavior normal.         Thought Content: Thought content normal.             Significant Labs: All pertinent labs within the past 24 hours have been reviewed.    Significant Imaging: I have reviewed all pertinent imaging results/findings within the past 24 hours.

## 2024-07-18 NOTE — PROGRESS NOTES
07/18/24 1335   Required for all Hemodialysis Patients   Hepatitis Status negative   Handoff Report   Received From SHAILA Samson   Given To SHAILA Chakraborty   Treatment Type   Treatment Type Maintenance   Vital Signs   Temp 97.6 °F (36.4 °C)   Temp Source Oral   Pulse 77   Resp 18   SpO2 98 %   BP (!) 96/54   Assessments (Pre/Post)   Consent Obtained yes   Safety vein preservation armband present   Date Hepatitis Profile Obtained 02/27/24   Blood Liters Processed (BLP) 57.8   Transport Modality bed   Level of Consciousness (AVPU) alert   Dialyzer Clearance mildly streaked   Pain   Pain Rating (0-10): Rest 0        Hemodialysis AV Fistula Left upper arm   No placement date or time found.   Location: Left upper arm   Needle Size 16ga   Site Assessment Clean;Dry;Intact   Patency Present;Thrill;Bruit   Status Deaccessed   Flows Good   Dressing Intervention Integrity maintained   Dressing Status Clean;Intact   Site Condition No complications   Dressing Gauze   Post-Hemodialysis Assessment   Rinseback Volume (mL) 250 mL   Blood Volume Processed (Liters) 57.8 L   Dialyzer Clearance Lightly streaked   Duration of Treatment 180 minutes   Additional Fluid Intake (mL) 100 mL   Total UF (mL) 600 mL   Net Fluid Removal 0   Patient Response to Treatment lamar tx   Post-Treatment Weight 64.9 kg (143 lb 1.3 oz)   Treatment Weight Change 0   Arterial bleeding stop time (min) 10 min   Venous bleeding stop time (min) 10 min   Post-Hemodialysis Comments tx complete, hemostasis achieved     Pt lamar tx with 0 net uf.  Pt had hypotension during tx and unable to pull fluid. Pt educated on fluid restrictions.  Report given to primary nurse.

## 2024-07-19 LAB
ABO + RH BLD: NORMAL
ALBUMIN SERPL BCP-MCNC: 3.4 G/DL (ref 3.5–5.2)
ALP SERPL-CCNC: 135 U/L (ref 55–135)
ALT SERPL W/O P-5'-P-CCNC: 79 U/L (ref 10–44)
ANION GAP SERPL CALC-SCNC: 13 MMOL/L (ref 8–16)
AST SERPL-CCNC: 51 U/L (ref 10–40)
BASOPHILS # BLD AUTO: 0.01 K/UL (ref 0–0.2)
BASOPHILS NFR BLD: 0.3 % (ref 0–1.9)
BILIRUB SERPL-MCNC: 3.8 MG/DL (ref 0.1–1)
BLD GP AB SCN CELLS X3 SERPL QL: NORMAL
BLD PROD TYP BPU: NORMAL
BLD PROD TYP BPU: NORMAL
BLOOD UNIT EXPIRATION DATE: NORMAL
BLOOD UNIT EXPIRATION DATE: NORMAL
BLOOD UNIT TYPE CODE: 6200
BLOOD UNIT TYPE CODE: 8400
BLOOD UNIT TYPE: NORMAL
BLOOD UNIT TYPE: NORMAL
BUN SERPL-MCNC: 32 MG/DL (ref 6–20)
CALCIUM SERPL-MCNC: 8 MG/DL (ref 8.7–10.5)
CHLORIDE SERPL-SCNC: 101 MMOL/L (ref 95–110)
CO2 SERPL-SCNC: 22 MMOL/L (ref 23–29)
CODING SYSTEM: NORMAL
CODING SYSTEM: NORMAL
CREAT SERPL-MCNC: 4.4 MG/DL (ref 0.5–1.4)
CROSSMATCH INTERPRETATION: NORMAL
CROSSMATCH INTERPRETATION: NORMAL
DIFFERENTIAL METHOD BLD: ABNORMAL
DISPENSE STATUS: NORMAL
DISPENSE STATUS: NORMAL
EOSINOPHIL # BLD AUTO: 0 K/UL (ref 0–0.5)
EOSINOPHIL NFR BLD: 1.3 % (ref 0–8)
ERYTHROCYTE [DISTWIDTH] IN BLOOD BY AUTOMATED COUNT: 18.3 % (ref 11.5–14.5)
EST. GFR  (NO RACE VARIABLE): 12.7 ML/MIN/1.73 M^2
GLUCOSE SERPL-MCNC: 138 MG/DL (ref 70–110)
GLUCOSE SERPL-MCNC: 83 MG/DL (ref 70–110)
GLUCOSE SERPL-MCNC: 88 MG/DL (ref 70–110)
GLUCOSE SERPL-MCNC: 94 MG/DL (ref 70–110)
GLUCOSE SERPL-MCNC: 97 MG/DL (ref 70–110)
HCT VFR BLD AUTO: 20.2 % (ref 37–48.5)
HGB BLD-MCNC: 6.7 G/DL (ref 12–16)
IMM GRANULOCYTES # BLD AUTO: 0.02 K/UL (ref 0–0.04)
IMM GRANULOCYTES NFR BLD AUTO: 0.7 % (ref 0–0.5)
LYMPHOCYTES # BLD AUTO: 0.7 K/UL (ref 1–4.8)
LYMPHOCYTES NFR BLD: 23.5 % (ref 18–48)
MAGNESIUM SERPL-MCNC: 2.1 MG/DL (ref 1.6–2.6)
MCH RBC QN AUTO: 29.6 PG (ref 27–31)
MCHC RBC AUTO-ENTMCNC: 33.2 G/DL (ref 32–36)
MCV RBC AUTO: 89 FL (ref 82–98)
MONOCYTES # BLD AUTO: 0.3 K/UL (ref 0.3–1)
MONOCYTES NFR BLD: 8.2 % (ref 4–15)
NEUTROPHILS # BLD AUTO: 2 K/UL (ref 1.8–7.7)
NEUTROPHILS NFR BLD: 66 % (ref 38–73)
NRBC BLD-RTO: 1 /100 WBC
NUM UNITS TRANS PACKED RBC: NORMAL
OHS QRS DURATION: 80 MS
OHS QTC CALCULATION: 460 MS
PLATELET # BLD AUTO: 64 K/UL (ref 150–450)
PMV BLD AUTO: 11.3 FL (ref 9.2–12.9)
POTASSIUM SERPL-SCNC: 4.6 MMOL/L (ref 3.5–5.1)
PROT SERPL-MCNC: 5.5 G/DL (ref 6–8.4)
RBC # BLD AUTO: 2.26 M/UL (ref 4–5.4)
SODIUM SERPL-SCNC: 136 MMOL/L (ref 136–145)
SPECIMEN OUTDATE: NORMAL
UNIT NUMBER: NORMAL
WBC # BLD AUTO: 3.06 K/UL (ref 3.9–12.7)

## 2024-07-19 PROCEDURE — 36415 COLL VENOUS BLD VENIPUNCTURE: CPT | Performed by: INTERNAL MEDICINE

## 2024-07-19 PROCEDURE — 30233N1 TRANSFUSION OF NONAUTOLOGOUS RED BLOOD CELLS INTO PERIPHERAL VEIN, PERCUTANEOUS APPROACH: ICD-10-PCS | Performed by: HOSPITALIST

## 2024-07-19 PROCEDURE — 86901 BLOOD TYPING SEROLOGIC RH(D): CPT | Performed by: INTERNAL MEDICINE

## 2024-07-19 PROCEDURE — 99232 SBSQ HOSP IP/OBS MODERATE 35: CPT | Mod: ,,, | Performed by: GENERAL PRACTICE

## 2024-07-19 PROCEDURE — 12000002 HC ACUTE/MED SURGE SEMI-PRIVATE ROOM

## 2024-07-19 PROCEDURE — 99900031 HC PATIENT EDUCATION (STAT)

## 2024-07-19 PROCEDURE — P9073 PLATELETS PHERESIS PATH REDU: HCPCS | Performed by: INTERNAL MEDICINE

## 2024-07-19 PROCEDURE — 90935 HEMODIALYSIS ONE EVALUATION: CPT

## 2024-07-19 PROCEDURE — 63600175 PHARM REV CODE 636 W HCPCS: Performed by: FAMILY MEDICINE

## 2024-07-19 PROCEDURE — 85025 COMPLETE CBC W/AUTO DIFF WBC: CPT | Performed by: HOSPITALIST

## 2024-07-19 PROCEDURE — 83735 ASSAY OF MAGNESIUM: CPT | Performed by: HOSPITALIST

## 2024-07-19 PROCEDURE — 86900 BLOOD TYPING SEROLOGIC ABO: CPT | Performed by: INTERNAL MEDICINE

## 2024-07-19 PROCEDURE — 63600175 PHARM REV CODE 636 W HCPCS: Mod: JZ,JG | Performed by: NURSE PRACTITIONER

## 2024-07-19 PROCEDURE — 30233R1 TRANSFUSION OF NONAUTOLOGOUS PLATELETS INTO PERIPHERAL VEIN, PERCUTANEOUS APPROACH: ICD-10-PCS | Performed by: HOSPITALIST

## 2024-07-19 PROCEDURE — 63600175 PHARM REV CODE 636 W HCPCS: Performed by: INTERNAL MEDICINE

## 2024-07-19 PROCEDURE — 25000003 PHARM REV CODE 250: Performed by: INTERNAL MEDICINE

## 2024-07-19 PROCEDURE — P9016 RBC LEUKOCYTES REDUCED: HCPCS | Performed by: INTERNAL MEDICINE

## 2024-07-19 PROCEDURE — 25000003 PHARM REV CODE 250: Performed by: HOSPITALIST

## 2024-07-19 PROCEDURE — 86920 COMPATIBILITY TEST SPIN: CPT | Performed by: INTERNAL MEDICINE

## 2024-07-19 PROCEDURE — 94761 N-INVAS EAR/PLS OXIMETRY MLT: CPT

## 2024-07-19 PROCEDURE — 99900035 HC TECH TIME PER 15 MIN (STAT)

## 2024-07-19 PROCEDURE — 27000221 HC OXYGEN, UP TO 24 HOURS

## 2024-07-19 PROCEDURE — 80053 COMPREHEN METABOLIC PANEL: CPT | Performed by: HOSPITALIST

## 2024-07-19 PROCEDURE — 63600175 PHARM REV CODE 636 W HCPCS: Performed by: HOSPITALIST

## 2024-07-19 RX ORDER — MORPHINE SULFATE 2 MG/ML
2 INJECTION, SOLUTION INTRAMUSCULAR; INTRAVENOUS EVERY 4 HOURS PRN
Status: DISCONTINUED | OUTPATIENT
Start: 2024-07-19 | End: 2024-07-23 | Stop reason: HOSPADM

## 2024-07-19 RX ORDER — HYDROCODONE BITARTRATE AND ACETAMINOPHEN 500; 5 MG/1; MG/1
TABLET ORAL
Status: DISCONTINUED | OUTPATIENT
Start: 2024-07-19 | End: 2024-07-21

## 2024-07-19 RX ORDER — LORAZEPAM 2 MG/ML
1 INJECTION INTRAMUSCULAR ONCE
Status: COMPLETED | OUTPATIENT
Start: 2024-07-19 | End: 2024-07-19

## 2024-07-19 RX ORDER — HYDROMORPHONE HYDROCHLORIDE 1 MG/ML
1 INJECTION, SOLUTION INTRAMUSCULAR; INTRAVENOUS; SUBCUTANEOUS ONCE
Status: COMPLETED | OUTPATIENT
Start: 2024-07-19 | End: 2024-07-19

## 2024-07-19 RX ORDER — MORPHINE SULFATE 2 MG/ML
1 INJECTION, SOLUTION INTRAMUSCULAR; INTRAVENOUS ONCE
Status: COMPLETED | OUTPATIENT
Start: 2024-07-19 | End: 2024-07-19

## 2024-07-19 RX ORDER — MORPHINE SULFATE 4 MG/ML
4 INJECTION, SOLUTION INTRAMUSCULAR; INTRAVENOUS ONCE
Status: COMPLETED | OUTPATIENT
Start: 2024-07-19 | End: 2024-07-19

## 2024-07-19 RX ADMIN — APIXABAN 5 MG: 5 TABLET, FILM COATED ORAL at 08:07

## 2024-07-19 RX ADMIN — CARVEDILOL 25 MG: 25 TABLET, FILM COATED ORAL at 08:07

## 2024-07-19 RX ADMIN — GABAPENTIN 200 MG: 100 CAPSULE ORAL at 09:07

## 2024-07-19 RX ADMIN — LEVETIRACETAM 500 MG: 500 TABLET, FILM COATED ORAL at 09:07

## 2024-07-19 RX ADMIN — AMLODIPINE BESYLATE 5 MG: 5 TABLET ORAL at 08:07

## 2024-07-19 RX ADMIN — HYDROCODONE BITARTRATE AND ACETAMINOPHEN 1 TABLET: 10; 325 TABLET ORAL at 11:07

## 2024-07-19 RX ADMIN — APIXABAN 5 MG: 5 TABLET, FILM COATED ORAL at 09:07

## 2024-07-19 RX ADMIN — MUPIROCIN 1 G: 20 OINTMENT TOPICAL at 08:07

## 2024-07-19 RX ADMIN — LORAZEPAM 1 MG: 2 INJECTION INTRAMUSCULAR; INTRAVENOUS at 12:07

## 2024-07-19 RX ADMIN — MUPIROCIN 1 G: 20 OINTMENT TOPICAL at 09:07

## 2024-07-19 RX ADMIN — ISOSORBIDE MONONITRATE 30 MG: 30 TABLET, EXTENDED RELEASE ORAL at 08:07

## 2024-07-19 RX ADMIN — LORAZEPAM 1 MG: 2 INJECTION INTRAMUSCULAR; INTRAVENOUS at 08:07

## 2024-07-19 RX ADMIN — MORPHINE SULFATE 1 MG: 2 INJECTION, SOLUTION INTRAMUSCULAR; INTRAVENOUS at 12:07

## 2024-07-19 RX ADMIN — SEVELAMER CARBONATE 800 MG: 800 TABLET, FILM COATED ORAL at 08:07

## 2024-07-19 RX ADMIN — MORPHINE SULFATE 4 MG: 4 INJECTION, SOLUTION INTRAMUSCULAR; INTRAVENOUS at 08:07

## 2024-07-19 RX ADMIN — FLUOXETINE 20 MG: 20 CAPSULE ORAL at 08:07

## 2024-07-19 RX ADMIN — CARVEDILOL 25 MG: 25 TABLET, FILM COATED ORAL at 09:07

## 2024-07-19 RX ADMIN — HYDROCODONE BITARTRATE AND ACETAMINOPHEN 1 TABLET: 10; 325 TABLET ORAL at 03:07

## 2024-07-19 RX ADMIN — ONDANSETRON 4 MG: 2 INJECTION INTRAMUSCULAR; INTRAVENOUS at 02:07

## 2024-07-19 RX ADMIN — FLUTICASONE PROPIONATE 50 MCG: 50 SPRAY, METERED NASAL at 08:07

## 2024-07-19 RX ADMIN — HYDROCODONE BITARTRATE AND ACETAMINOPHEN 1 TABLET: 10; 325 TABLET ORAL at 04:07

## 2024-07-19 RX ADMIN — GABAPENTIN 200 MG: 100 CAPSULE ORAL at 08:07

## 2024-07-19 RX ADMIN — MORPHINE SULFATE 2 MG: 2 INJECTION, SOLUTION INTRAMUSCULAR; INTRAVENOUS at 11:07

## 2024-07-19 RX ADMIN — HYDROMORPHONE HYDROCHLORIDE 1 MG: 1 INJECTION, SOLUTION INTRAMUSCULAR; INTRAVENOUS; SUBCUTANEOUS at 05:07

## 2024-07-19 RX ADMIN — EPOETIN ALFA-EPBX 10000 UNITS: 10000 INJECTION, SOLUTION INTRAVENOUS; SUBCUTANEOUS at 12:07

## 2024-07-19 RX ADMIN — LEVETIRACETAM 500 MG: 500 TABLET, FILM COATED ORAL at 08:07

## 2024-07-19 NOTE — NURSING
Patient complaining of chest pains radiating to her back. Informed from day shift nurse that morphine and nitro Sl was given twice during the day. Patient states that it did not help and that the pain was worse. EKG was done.  Notified MD. Also patient had a blood sugar of 51; half amp of dextrose was given. Patient blood pressure was 203/100. MD was notified and order were give. MD Hubbard assessed patient at bedside.

## 2024-07-19 NOTE — PROGRESS NOTES
[facility]  Progress Note    Patient Name: Tabby Howard  MRN: [id number]  Patient Class: IP- Inpatient   Admission Date: 7/16/2024  Length of Stay: 1 days  Attending Physician: Alice Fish MD  Primary Care Provider: Melony Kim NP        Subjective:     Principal Problem:Abdominal pain        HPI:  This is a 34-year-old female with end-stage kidney disease and on dialysis presented to the hospital after not feeling well during dialysis.  In the middle of dialysis the patient had abdominal discomfort and complained of diarrhea.  Dialysis was stopped and the patient was sent to the hospital.  Workup included CT of the abdomen and pelvis which resulted in diffuse body wall edema, mild circumferential wall thickening of the distal descending colon, sigmoid colon and rectum.  There was also circumferential bladder wall thickening.  This process could correlate with infectious/inflammatory process.  Patient was given antibiotics empirically in the ER.  Patient has chronic pain and complaints of abdominal pain.  No major fever or chills.  Nephrology was informed and they would like the patient to be admitted so that she complete her dialysis tomorrow.    Overview/Hospital Course:  The patient was admitted and placed on prn pain meds. Her presentation was similar to past presentations of chronic abdominal pain. It was thought the findings on abdominal and pelvic CT were most consistent with fluid overload due to missed dialysis sessions. Nephrology  was consulted and dialysis orders were placed. Echo was ordered to evaluate pericardial effusion seen on CT, and revealed small effusion with tamponade. Cardiology consulted. Recommended continue dialysis to remove fluid, and felt this would be adequate to remove the pericardial effusion. Recommended repeating echo prior to discharge.     Interval History: c/o abdominal pain    Review of Systems   Constitutional:  Negative for  activity change, appetite change, chills and fever.   HENT:  Negative for congestion, ear pain, nosebleeds and sinus pain.    Eyes:  Negative for discharge and itching.   Respiratory:  Negative for apnea, cough, chest tightness and shortness of breath.    Cardiovascular:  Negative for chest pain, palpitations and leg swelling.   Gastrointestinal:  Positive for abdominal distention and abdominal pain. Negative for vomiting.   Genitourinary:  Negative for difficulty urinating, dysuria, flank pain and frequency.   Musculoskeletal:  Negative for arthralgias, back pain, joint swelling and myalgias.   Skin:  Negative for color change, pallor and rash.   Neurological:  Negative for dizziness, weakness, light-headedness and headaches.   Psychiatric/Behavioral:  Negative for agitation, behavioral problems, confusion and suicidal ideas.      Objective:     Vital Signs (Most Recent):  Temp: 98.9 °F (37.2 °C) (07/19/24 1105)  Pulse: (!) 57 (07/19/24 1200)  Resp: 18 (07/19/24 1249)  BP: (!) 163/100 (07/19/24 1200)  SpO2: 97 % (07/19/24 1105) Vital Signs (24h Range):  Temp:  [97.6 °F (36.4 °C)-99.1 °F (37.3 °C)] 98.9 °F (37.2 °C)  Pulse:  [] 57  Resp:  [16-24] 18  SpO2:  [86 %-100 %] 97 %  BP: ()/() 163/100     Weight: 65 kg (143 lb 4.8 oz)  Body mass index is 26.21 kg/m².    Intake/Output Summary (Last 24 hours) at 7/19/2024 1321  Last data filed at 7/19/2024 0632  Gross per 24 hour   Intake 460 ml   Output 600 ml   Net -140 ml         Physical Exam  Vitals reviewed.   Constitutional:       General: She is not in acute distress.     Appearance: Normal appearance. She is normal weight. She is not ill-appearing.   HENT:      Head: Normocephalic and atraumatic.      Nose: Nose normal.      Mouth/Throat:      Mouth: Mucous membranes are moist.      Pharynx: Oropharynx is clear.   Eyes:      General: No scleral icterus.     Extraocular Movements: Extraocular movements intact.      Conjunctiva/sclera: Conjunctivae  normal.      Pupils: Pupils are equal, round, and reactive to light.   Cardiovascular:      Rate and Rhythm: Normal rate and regular rhythm.      Pulses: Normal pulses.      Heart sounds: Normal heart sounds. No murmur heard.     No gallop.   Pulmonary:      Effort: Pulmonary effort is normal. No respiratory distress.      Breath sounds: Normal breath sounds. No wheezing, rhonchi or rales.   Chest:      Chest wall: No tenderness.   Abdominal:      General: Abdomen is flat. There is distension.      Palpations: Abdomen is soft.      Tenderness: There is abdominal tenderness.   Musculoskeletal:         General: No swelling or tenderness.      Cervical back: Normal range of motion and neck supple. No rigidity or tenderness.      Right lower leg: No edema.      Left lower leg: No edema.   Skin:     General: Skin is warm and dry.   Neurological:      General: No focal deficit present.      Mental Status: She is alert and oriented to person, place, and time. Mental status is at baseline.      Motor: No weakness.   Psychiatric:         Mood and Affect: Mood normal.         Behavior: Behavior normal.         Thought Content: Thought content normal.             Significant Labs: All pertinent labs within the past 24 hours have been reviewed.    Significant Imaging: I have reviewed all pertinent imaging results/findings within the past 24 hours.    Assessment/Plan:      * Abdominal pain  - It is difficult to determine if the patient has colitis or any inflammatory process in the abdomen due to persistent and chronic generalized pain.  Also, her edema noted in the CT scan could likely be due to volume overload and not completing her dialysis.  - Patient received a dose of antibiotics in the ER.  - Currently with holding further antibiotics  - Follow lab   Monitor    Seizures    Continue Keppra    MDD (major depressive disorder)  Fluoxetine 20 mg daily    Hypertension  Amlodipine 5 mg daily   Carvedilol 25 mg p.o. b.i.d.    Hydralazine 25 mg t.i.d.  isosorbide mononitrate 30 mg daily    Deep vein thrombosis (DVT) of non-extremity vein  Continue apixaban      ESRD (end stage renal disease)  Nephrology consulted   Sevelamer    Pericardial effusion  CT of the chest showed moderate to severe pericardial effusion.  Echo revealed small effusion with tamponade. Cardiology consulted.    Diabetes  Lantus 22 units daily   Insulin sliding scale      VTE Risk Mitigation (From admission, onward)           Ordered     apixaban tablet 5 mg  2 times daily         07/17/24 1424     Reason for No Pharmacological VTE Prophylaxis  Once        Question:  Reasons:  Answer:  Already adequately anticoagulated on oral Anticoagulants    07/16/24 2030     IP VTE HIGH RISK PATIENT  Once         07/16/24 2030     Place sequential compression device  Until discontinued         07/16/24 2030                    Discharge Planning   TATIANA: 7/22/2024     Code Status: Full Code   Is the patient medically ready for discharge?:     Reason for patient still in hospital (select all that apply): Patient trending condition  Discharge Plan A: Home with family                  Alice Fish MD, MD  Department of Hospital Medicine   [facility]

## 2024-07-19 NOTE — PROGRESS NOTES
Recommend dialyzing for at least 3 days and then repeating echo to reassess pericardial effusion.  Cardiology  will be available p.r.n. over the weekend and will follow back up Monday.    REPEAT ECHO MONDAY TO RULE OUT TAMPONADE  TAMPONADE WILL LIKELY CLEAR UP WITH DIALYSIS SINCE SHE PREVIOUSLY MISSED ABOUT 8 SESSIONS    DISCUSSED WITH NEPHROLOGY

## 2024-07-19 NOTE — SIGNIFICANT EVENT
Called to dialysis by nurses who said left arm AV fistula wont stop  bleeding from arterial site more than 1 hour even with holding pressure.  On my arrival there is small amount of blood on the sheets and bleeding appears to have stopped, fistula is palpable.  We will consult vascular surgery as I am not comfortable stitching fistula.  1 mg IM Dilaudid given for intractable pain.

## 2024-07-19 NOTE — PROGRESS NOTES
INPATIENT NEPHROLOGY progress  [facility]    Patient Name: Tabby Howard  Date: 07/19/2024    Reason for consultation: ESRD    Chief Complaint:   Chief Complaint   Patient presents with    Abdominal Pain     Patient was at dialysis and complained of lower back pain, did not finish  only took 3 kg at dialysis.  Has missed about 8 appointments in the last 6 weeks of dialysis. CBG was 58, D10 given.  SpO2 did drop from 97 to 79% on room.  Patient is on 3 liters.         History of Present Illness:  This is a 34-year-old female with end-stage kidney disease and on dialysis presented to the hospital after not feeling well during dialysis. In the middle of dialysis the patient had abdominal discomfort and complained of diarrhea. Dialysis was stopped and the patient was sent to the hospital. She got 2L off. She was 12L above dry weight. Workup included CT of the abdomen and pelvis which resulted in diffuse body wall edema, mild circumferential wall thickening of the distal descending colon, sigmoid colon and rectum. There was also circumferential bladder wall thickening. This process could correlate with infectious/inflammatory process. Patient was given antibiotics empirically in the ER. Patient has chronic pain and complaints of abdominal pain. No major fever or chills. Consulted for dialysis.    Interval History:  7/17- IUF today, HD/UF MWF  7/18  had IUF only yesterday, K+ a little elevated, will run regular tx today.  Pt starting to feel better w/fluid off.  Hgb dropping, ordered epo w/HD on MWF.  7/19  Patient seen on hemodialysis for uremic clearance and ultrafiltration.        Plan of Care:    Assessment:  ESRD on HD MWF  HTN/Hypervolemia  SHPT  Anemia of CKD    Plan:    - ordered HD MWF  - resume home BP meds  - ordered daily UF- 3-4L  - renal diet, 1.5L fluid restriction  - resume binder with meals  - ordered [facility] with HD    Thank you for allowing us to participate in this patient's care. We  will continue to follow.    Vital Signs:  Temp Readings from Last 3 Encounters:   24 98.9 °F (37.2 °C) (Oral)   24 98.2 °F (36.8 °C) (Oral)   24 98.3 °F (36.8 °C) (Oral)       Pulse Readings from Last 3 Encounters:   24 (!) 57   24 90   24 75       BP Readings from Last 3 Encounters:   24 (!) 163/100   24 (!) 169/85   24 (!) 149/79       Weight:  Wt Readings from Last 3 Encounters:   24 65 kg (143 lb 4.8 oz)   24 64.9 kg (143 lb)   24 52.6 kg (116 lb)       Past Medical & Surgical History:  Past Medical History:   Diagnosis Date    ESRD on hemodialysis 2022    Gastritis 2022    EGD was 22    Gastroparesis 2022    has not had Emptying study    Heart failure with preserved ejection fraction 2022    EF 55% on 3/22    History of supraventricular tachycardia     Hyperkalemia 2022    Hypertensive emergency 2022    Sickle cell trait 2022    Type 2 diabetes mellitus        Past Surgical History:   Procedure Laterality Date     SECTION      x 3    COLONOSCOPY      COLONOSCOPY N/A 2022    Procedure: COLONOSCOPY;  Surgeon: Jagdeep Cedeno MD;  Location: [facility];  Service: Endoscopy;  Laterality: N/A;    DESTRUCTION, CILIARY BODY, USING LASER Left 3/8/2024    Procedure: Cyclophotocoagulation G-probe, retrobulbar chlorpromazine left eye;  Surgeon: Fidel Wise MD;  Location: [address];  Service: Ophthalmology;  Laterality: Left;    ENDOSCOPIC ULTRASOUND OF UPPER GASTROINTESTINAL TRACT N/A 2023    Procedure: ULTRASOUND, UPPER GI TRACT, ENDOSCOPIC;  Surgeon: Micky Paredes III, MD;  Location: [facility];  Service: Endoscopy;  Laterality: N/A;    ESOPHAGOGASTRODUODENOSCOPY N/A 10/18/2019    Procedure: ESOPHAGOGASTRODUODENOSCOPY (EGD);  Surgeon: Gianluca Mendez MD;  Location: [facility];  Service: Endoscopy;  Laterality: N/A;    ESOPHAGOGASTRODUODENOSCOPY N/A 2022    Procedure:  EGD (ESOPHAGOGASTRODUODENOSCOPY);  Surgeon: Micky Paredes III, MD;  Location: [facility] ENDO;  Service: Endoscopy;  Laterality: N/A;    ESOPHAGOGASTRODUODENOSCOPY N/A 12/5/2022    Procedure: EGD (ESOPHAGOGASTRODUODENOSCOPY);  Surgeon: Marcelo Zhong MD;  Location: [facility] ENDO;  Service: Endoscopy;  Laterality: N/A;    INSERTION, CATHETER, TUNNELED N/A 6/17/2023    Procedure: Insertion,catheter,tunneled;  Surgeon: Carlos Thurman Jr., MD;  Location: [facility] OR;  Service: General;  Laterality: N/A;    LAPAROSCOPIC CHOLECYSTECTOMY N/A 07/30/2022    Procedure: CHOLECYSTECTOMY, LAPAROSCOPIC;  Surgeon: Grey Perez MD;  Location: [facility] OR;  Service: General;  Laterality: N/A;    PLACEMENT OF DUAL-LUMEN VASCULAR CATHETER Left 07/12/2022    Procedure: INSERTION-CATHETER-JOSEPH;  Surgeon: Dionte Gan MD;  Location: [facility] OR;  Service: General;  Laterality: Left;    PLACEMENT OF DUAL-LUMEN VASCULAR CATHETER Right 07/26/2022    Procedure: INSERTION-CATHETER-Hemosplit;  Surgeon: Dionte Gan MD;  Location: [facility] OR;  Service: General;  Laterality: Right;       Past Social History:  Social History     Socioeconomic History    Marital status:    Tobacco Use    Smoking status: Never    Smokeless tobacco: Never   Substance and Sexual Activity    Alcohol use: Not Currently    Drug use: No    Sexual activity: Not Currently     Partners: Male     Birth control/protection: I.U.D.     Social Determinants of Health     Financial Resource Strain: Low Risk  (2/28/2024)    Overall Financial Resource Strain (CARDIA)     Difficulty of Paying Living Expenses: Not very hard   Food Insecurity: No Food Insecurity (2/28/2024)    Hunger Vital Sign     Worried About Running Out of Food in the Last Year: Never true     Ran Out of Food in the Last Year: Never true   Transportation Needs: No Transportation Needs (2/28/2024)    PRAPARE - Transportation     Lack of Transportation (Medical): No     Lack of Transportation  (Non-Medical): No   Physical Activity: Insufficiently Active (2/28/2024)    Exercise Vital Sign     Days of Exercise per Week: 1 day     Minutes of Exercise per Session: 10 min   Stress: No Stress Concern Present (2/28/2024)    [employer] - Occupational Stress Questionnaire     Feeling of Stress : Only a little   Housing Stability: Low Risk  (2/28/2024)    Housing Stability Vital Sign     Unable to Pay for Housing in the Last Year: No     Number of Places Lived in the Last Year: 2     Unstable Housing in the Last Year: No       Medications:  Scheduled Meds:   amLODIPine  5 mg Oral Daily    apixaban  5 mg Oral BID    carvediloL  25 mg Oral BID    epoetin teresa-epbx  10,000 Units Intravenous Every Mon, Wed, Fri    FLUoxetine  20 mg Oral Daily    fluticasone propionate  1 spray Each Nostril Daily    gabapentin  200 mg Oral BID    hydrALAZINE  25 mg Oral Q8H    isosorbide mononitrate  30 mg Oral Daily    labetalol  20 mg Intravenous Once    levETIRAcetam  500 mg Oral BID    lorazepam  1 mg Intravenous Once    mupirocin   Nasal BID    pantoprazole  40 mg Oral Before breakfast    promethazine (PHENERGAN) 12.5 mg in 0.9% NaCl 50 mL IVPB  12.5 mg Intravenous Once    sevelamer carbonate  800 mg Oral TID WM     Continuous Infusions:   D10W   Intravenous Continuous 50 mL/hr at 07/18/24 2343 New Bag at 07/18/24 2343     PRN Meds:.  Current Facility-Administered Medications:     0.9%  NaCl infusion (for blood administration), , Intravenous, Q24H PRN    acetaminophen, 650 mg, Oral, Q8H PRN    acetaminophen, 650 mg, Oral, Q4H PRN    albuterol sulfate, 2.5 mg, Nebulization, Q4H PRN    aluminum-magnesium hydroxide-simethicone, 30 mL, Oral, QID PRN    dextrose 50%, 12.5 g, Intravenous, PRN    dextrose 50%, 25 g, Intravenous, PRN    diphenhydrAMINE, 25 mg, Oral, Q6H PRN    glucagon (human recombinant), 1 mg, Intramuscular, PRN    glucose, 16 g, Oral, PRN    glucose, 24 g, Oral, PRN     HYDROcodone-acetaminophen, 1 tablet, Oral, Q4H PRN    insulin aspart U-100, 0-5 Units, Subcutaneous, QID (AC + HS) PRN    magnesium oxide, 800 mg, Oral, PRN    magnesium oxide, 800 mg, Oral, PRN    melatonin, 6 mg, Oral, Nightly PRN    morphine, 2 mg, Intravenous, Q4H PRN    naloxone, 0.02 mg, Intravenous, PRN    nitroGLYCERIN, 0.4 mg, Sublingual, Q5 Min PRN    ondansetron, 4 mg, Intravenous, Q6H PRN    potassium bicarbonate, 35 mEq, Oral, PRN    potassium bicarbonate, 50 mEq, Oral, PRN    potassium bicarbonate, 60 mEq, Oral, PRN    potassium, sodium phosphates, 2 packet, Oral, PRN    potassium, sodium phosphates, 2 packet, Oral, PRN    potassium, sodium phosphates, 2 packet, Oral, PRN    sodium chloride 0.9%, 10 mL, Intravenous, Q12H PRN  No current facility-administered medications on file prior to encounter.     Current Outpatient Medications on File Prior to Encounter   Medication Sig Dispense Refill    acetaZOLAMIDE (DIAMOX) 250 MG tablet take 1 tablet by mouth 3 times a day (Patient taking differently: Take 250 mg by mouth 3 (three) times daily.) 90 tablet 3    albuterol (VENTOLIN HFA) 90 mcg/actuation inhaler Inhale 2 puffs every 4 hours by inhalation route. (Patient taking differently: Inhale 2 puffs into the lungs every 4 (four) hours as needed for Shortness of Breath or Wheezing.) 8 g 2    amLODIPine (NORVASC) 5 MG tablet Take 1 tablet (5 mg total) by mouth once daily. 30 tablet 11    apixaban (ELIQUIS) 5 mg Tab Take 1 tablet (5 mg total) by mouth 2 (two) times daily. Patient w/ thrombocytopenia <50, so held during admission, follow-up with hematologist about restarting 180 tablet 1    atropine 1% (ISOPTO ATROPINE) 1 % Drop Place 1 drop into the left eye 2 (two) times a day. 15 mL 3    blood sugar diagnostic (TRUE METRIX GLUCOSE TEST STRIP) Strp Use 1 strip to check blood glucose three times daily 100 each 11    blood-glucose meter (TRUE METRIX GLUCOSE METER) Misc Use to check blood glucose 1 each 0     blood-glucose meter,continuous (DEXCOM ) Misc USE WITH SENSORS 1 each 0    blood-glucose sensor Heather CHANGE EVERY 10 DAYS 3 each 0    brimonidine 0.15 % OPTH DROP (ALPHAGAN) 0.15 % ophthalmic solution Place 1 drop into both eyes 3 (three) times daily. 15 mL 3    brinzolamide (AZOPT) 1 % ophthalmic suspension Place1 drop in left eye 3 times a day for 30 days (Patient taking differently: Place 1 drop into both eyes 3 (three) times daily.) 15 mL 6    busPIRone (BUSPAR) 10 MG tablet Take 1 tablet (10 mg total) by mouth 3 (three) times daily as needed (anxiety). 60 tablet 5    carvediloL (COREG) 25 MG tablet take 1 tablet Oral 2 times daily (Patient taking differently: Take 25 mg by mouth 2 (two) times daily.) 60 tablet 0    cetirizine (ZYRTEC) 10 MG tablet Take 1 tablet every day by oral route. (Patient taking differently: Take 10 mg by mouth once daily.) 30 tablet 0    dorzolamide-timolol 2-0.5% (COSOPT) 22.3-6.8 mg/mL ophthalmic solution place 1 drop in left eye twice a day  for 30 days (Patient taking differently: Place 1 drop into both eyes 2 (two) times daily.) 10 mL 0    FLUoxetine 20 MG capsule Take 1 capsule every day by oral route for 90 days. (Patient taking differently: Take 20 mg by mouth once daily.) 90 capsule 1    FLUoxetine 40 MG capsule Take 1 capsule every day by oral route. (Patient taking differently: Take 40 mg by mouth once daily.) 30 capsule 2    fluticasone propionate (FLONASE ALLERGY RELIEF) 50 mcg/actuation nasal spray [place] 1 spray every day by intranasal route. (Patient taking differently: 1 spray by Each Nostril route once daily.) 16 g 2    gabapentin (NEURONTIN) 300 MG capsule Take 2 capsules twice a day by oral route for 90 days. (Patient taking differently: Take 600 mg by mouth 2 (two) times daily.) 360 capsule 1    hydrALAZINE (APRESOLINE) 25 MG tablet take 1 tablet by mouth every 8 hours (Patient taking differently: Take 25 mg by mouth every 8 (eight) hours.) 90 tablet 0     "insulin detemir U-100, Levemir, (LEVEMIR FLEXTOUCH U100 INSULIN) 100 unit/mL (3 mL) InPn pen Inject 22 Units into the skin every evening. Patient not needing insulin in-patient. Follow-up with PCP for hospital follow-up and restarting the medication. Or restart medication if glucose >200 consistently resume home insulin regimen. Or if glucose is persistently less than 100, decrease by 5 units until following up with PCP 15 mL 1    insulin glargine U-100, Lantus, 100 unit/mL injection inject 13 unit subcutaneously 2 times daily (Patient taking differently: Inject 13 Units into the skin 2 (two) times a day.) 10 mL 0    isosorbide mononitrate (IMDUR) 30 MG 24 hr tablet Take 1 tablet (30 mg total) by mouth once daily. 90 tablet 1    lancets (TRUEPLUS LANCETS) 33 gauge Misc Use 1 to check blood glucose three times daily 100 each 11    lancets 33 gauge Misc Use to test twice daily 100 each 5    levETIRAcetam (KEPPRA) 500 MG Tab Take 1 tablet (500 mg total) by mouth 2 (two) times daily. 60 tablet 11    LORazepam (ATIVAN) 0.5 MG tablet Take 1 tablet 3 times a day by oral route for 7 days, for severe panic. (Patient taking differently: Take 0.5 mg by mouth 3 (three) times daily.) 21 tablet 2    ondansetron (ZOFRAN-ODT) 4 MG TbDL Place 1 tablet (4 mg) on or under the tongue every 8 hours for 10 days. (Patient taking differently: Take 8 mg by mouth every 8 (eight) hours as needed.) 24 tablet 2    oxyCODONE-acetaminophen (PERCOCET)  mg per tablet Take 1 tablet by mouth every 4 (four) hours as needed for Pain. 42 tablet 0    pantoprazole (PROTONIX) 40 MG tablet Take 1 tablet (40 mg total) by mouth once daily. 30 tablet 0    pen needle, diabetic 31 gauge x 3/16" Ndle Use as directed with insulin once daily 100 each 0    prednisoLONE acetate (PRED FORTE) 1 % DrpS Place 1 drop into the left eye 2 (two) times daily. 5 mL 3    predniSONE (DELTASONE) 20 MG tablet take 3 tablets Oral Daily,x30 day(s) (Patient taking differently: " Take 20 mg by mouth once daily.) 90 tablet 0    sevelamer carbonate (RENVELA) 800 mg Tab Take 1 tablet (800 mg total) by mouth 3 (three) times daily with meals. 180 tablet 11    sucralfate (CARAFATE) 100 mg/mL suspension Take 10 mL 4 times a day by oral route before meals for 30 days. (Patient taking differently: Take 10 mLs by mouth 4 (four) times daily with meals and nightly.) 1200 mL 1    timolol maleate 0.5% (TIMOPTIC) 0.5 % Drop Place 1 drop in left eye 2 times a day for 30 days (Patient taking differently: Place 1 drop into both eyes 2 (two) times daily.) 5 mL 6    tobramycin-dexAMETHasone 0.3-0.1% (TOBRADEX) 0.3-0.1 % DrpS 1 drop Eye-Left every 6 hours for 5 day(s) 5 mL 0    [DISCONTINUED] atenoloL (TENORMIN) 50 MG tablet Take 1 tablet (50 mg total) by mouth every other day. 45 tablet 3    [DISCONTINUED] omeprazole (PRILOSEC) 20 MG capsule Take 2 capsules (40 mg total) by mouth once daily. for 10 days 20 capsule 0       Allergies:  Droperidol and Penicillins    Past Family History:  Reviewed; refer to Hospitalist Admission Note    Review of Systems:  Not in ER- Moving floors    Physical Exam:  Not in ER    Results:  Lab Results   Component Value Date     07/19/2024    K 4.6 07/19/2024     07/19/2024    CO2 22 (L) 07/19/2024    BUN 32 (H) 07/19/2024    CREATININE 4.4 (H) 07/19/2024    CALCIUM 8.0 (L) 07/19/2024    ANIONGAP 13 07/19/2024    ESTGFRAFRICA 19 (L) 09/03/2022    EGFRNONAA 18 (A) 07/31/2022       Lab Results   Component Value Date    CALCIUM 8.0 (L) 07/19/2024    PHOS 7.5 (H) 06/22/2024       Recent Labs   Lab 07/19/24  1115   WBC 3.06*   RBC 2.26*   HGB 6.7*   HCT 20.2*   PLT 64*   MCV 89   MCH 29.6   MCHC 33.2           Hugh Figueroa MD    [facility] [place]  [address]  [place] [address]  [phone] (p)  [phone] (f)

## 2024-07-19 NOTE — CARE UPDATE
07/19/24 0900   Patient Assessment/Suction   Level of Consciousness (AVPU) alert   All Lung Fields Breath Sounds diminished   PRE-TX-O2   Device (Oxygen Therapy) nasal cannula   $ Is the patient on Low Flow Oxygen? Yes   Flow (L/min) (Oxygen Therapy) 2   Pulse Oximetry Type Intermittent   $ Pulse Oximetry - Multiple Charge Pulse Oximetry - Multiple   Pulse 88   Resp 18   Aerosol Therapy   $ Aerosol Therapy Charges PRN treatment not required

## 2024-07-19 NOTE — SIGNIFICANT EVENT
NOCTURNIST NOTE       RN notified me at beginning of shift patient complaining of chest pain     Also Hypoglycemia       She had this in day time also and cardiology saw her    EKG and trop were normal     No MI or CAD hx      But she has pericardial effusion with some tamponade 17 th echo       Her BP was high almost 200    so I was not worried about the tamponade because she has to have enough venous return to the heart to generate that kind of cardiac output --->  high pressure         I gave     - 0.5 mg IV dilaudid   - 5 mg IV valium     New labs bmp , mag trop          Labs were ok        Patient quickly went to sleep. BP dropped but quickly came back up .  To 150s      I went to see her and she was sleeping but woke up and was fine.  She was calm and had no pain anymore.            I wonder if her pain is from the effusion ?          As for the hypoglycemia    We had stopped her Lantus the night before      She was on D5 IVFs        So I stopped the D5    She was given 1 amp D50 by nurses         And I'm starting D10 IVF  at 50 cc to start     Check accu checks q4 and PRN         She will get dialysis again tomorrow     Nephrology aware             Total cc time ;  35 minutes

## 2024-07-19 NOTE — SUBJECTIVE & OBJECTIVE
Interval History: c/o abdominal pain    Review of Systems   Constitutional:  Negative for activity change, appetite change, chills and fever.   HENT:  Negative for congestion, ear pain, nosebleeds and sinus pain.    Eyes:  Negative for discharge and itching.   Respiratory:  Negative for apnea, cough, chest tightness and shortness of breath.    Cardiovascular:  Negative for chest pain, palpitations and leg swelling.   Gastrointestinal:  Positive for abdominal distention and abdominal pain. Negative for vomiting.   Genitourinary:  Negative for difficulty urinating, dysuria, flank pain and frequency.   Musculoskeletal:  Negative for arthralgias, back pain, joint swelling and myalgias.   Skin:  Negative for color change, pallor and rash.   Neurological:  Negative for dizziness, weakness, light-headedness and headaches.   Psychiatric/Behavioral:  Negative for agitation, behavioral problems, confusion and suicidal ideas.      Objective:     Vital Signs (Most Recent):  Temp: 98.9 °F (37.2 °C) (07/19/24 1105)  Pulse: (!) 57 (07/19/24 1200)  Resp: 18 (07/19/24 1249)  BP: (!) 163/100 (07/19/24 1200)  SpO2: 97 % (07/19/24 1105) Vital Signs (24h Range):  Temp:  [97.6 °F (36.4 °C)-99.1 °F (37.3 °C)] 98.9 °F (37.2 °C)  Pulse:  [] 57  Resp:  [16-24] 18  SpO2:  [86 %-100 %] 97 %  BP: ()/() 163/100     Weight: 65 kg (143 lb 4.8 oz)  Body mass index is 26.21 kg/m².    Intake/Output Summary (Last 24 hours) at 7/19/2024 1321  Last data filed at 7/19/2024 0632  Gross per 24 hour   Intake 460 ml   Output 600 ml   Net -140 ml         Physical Exam  Vitals reviewed.   Constitutional:       General: She is not in acute distress.     Appearance: Normal appearance. She is normal weight. She is not ill-appearing.   HENT:      Head: Normocephalic and atraumatic.      Nose: Nose normal.      Mouth/Throat:      Mouth: Mucous membranes are moist.      Pharynx: Oropharynx is clear.   Eyes:      General: No scleral icterus.      Extraocular Movements: Extraocular movements intact.      Conjunctiva/sclera: Conjunctivae normal.      Pupils: Pupils are equal, round, and reactive to light.   Cardiovascular:      Rate and Rhythm: Normal rate and regular rhythm.      Pulses: Normal pulses.      Heart sounds: Normal heart sounds. No murmur heard.     No gallop.   Pulmonary:      Effort: Pulmonary effort is normal. No respiratory distress.      Breath sounds: Normal breath sounds. No wheezing, rhonchi or rales.   Chest:      Chest wall: No tenderness.   Abdominal:      General: Abdomen is flat. There is distension.      Palpations: Abdomen is soft.      Tenderness: There is abdominal tenderness.   Musculoskeletal:         General: No swelling or tenderness.      Cervical back: Normal range of motion and neck supple. No rigidity or tenderness.      Right lower leg: No edema.      Left lower leg: No edema.   Skin:     General: Skin is warm and dry.   Neurological:      General: No focal deficit present.      Mental Status: She is alert and oriented to person, place, and time. Mental status is at baseline.      Motor: No weakness.   Psychiatric:         Mood and Affect: Mood normal.         Behavior: Behavior normal.         Thought Content: Thought content normal.             Significant Labs: All pertinent labs within the past 24 hours have been reviewed.    Significant Imaging: I have reviewed all pertinent imaging results/findings within the past 24 hours.

## 2024-07-20 ENCOUNTER — CLINICAL SUPPORT (OUTPATIENT)
Dept: CARDIOLOGY | Facility: HOSPITAL | Age: 34
DRG: 637 | End: 2024-07-20
Attending: INTERNAL MEDICINE
Payer: MEDICAID

## 2024-07-20 VITALS — WEIGHT: 145.31 LBS | BODY MASS INDEX: 26.74 KG/M2 | HEIGHT: 62 IN

## 2024-07-20 LAB
ALBUMIN SERPL BCP-MCNC: 3.3 G/DL (ref 3.5–5.2)
ALP SERPL-CCNC: 132 U/L (ref 55–135)
ALT SERPL W/O P-5'-P-CCNC: 65 U/L (ref 10–44)
ANION GAP SERPL CALC-SCNC: 20 MMOL/L (ref 8–16)
APICAL FOUR CHAMBER EJECTION FRACTION: 54 %
AST SERPL-CCNC: 36 U/L (ref 10–40)
B-OH-BUTYR BLD STRIP-SCNC: 4.7 MMOL/L (ref 0–0.5)
BASOPHILS # BLD AUTO: 0.02 K/UL (ref 0–0.2)
BASOPHILS NFR BLD: 0.2 % (ref 0–1.9)
BILIRUB SERPL-MCNC: 3.9 MG/DL (ref 0.1–1)
BSA FOR ECHO PROCEDURE: 1.7 M2
BUN SERPL-MCNC: 31 MG/DL (ref 6–20)
CALCIUM SERPL-MCNC: 8.6 MG/DL (ref 8.7–10.5)
CHLORIDE SERPL-SCNC: 101 MMOL/L (ref 95–110)
CO2 SERPL-SCNC: 19 MMOL/L (ref 23–29)
CREAT SERPL-MCNC: 3.2 MG/DL (ref 0.5–1.4)
CV ECHO LV RWT: 0.59 CM
DIFFERENTIAL METHOD BLD: ABNORMAL
ECHO LV POSTERIOR WALL: 1.38 CM (ref 0.6–1.1)
EOSINOPHIL # BLD AUTO: 0 K/UL (ref 0–0.5)
EOSINOPHIL NFR BLD: 0.1 % (ref 0–8)
ERYTHROCYTE [DISTWIDTH] IN BLOOD BY AUTOMATED COUNT: 19.3 % (ref 11.5–14.5)
EST. GFR  (NO RACE VARIABLE): 18.7 ML/MIN/1.73 M^2
FRACTIONAL SHORTENING: 36 % (ref 28–44)
GLUCOSE SERPL-MCNC: 202 MG/DL (ref 70–110)
GLUCOSE SERPL-MCNC: 209 MG/DL (ref 70–110)
GLUCOSE SERPL-MCNC: 223 MG/DL (ref 70–110)
GLUCOSE SERPL-MCNC: 228 MG/DL (ref 70–110)
GLUCOSE SERPL-MCNC: 248 MG/DL (ref 70–110)
GLUCOSE SERPL-MCNC: 314 MG/DL (ref 70–110)
HCT VFR BLD AUTO: 26.8 % (ref 37–48.5)
HGB BLD-MCNC: 8.7 G/DL (ref 12–16)
IMM GRANULOCYTES # BLD AUTO: 0.12 K/UL (ref 0–0.04)
IMM GRANULOCYTES NFR BLD AUTO: 1.4 % (ref 0–0.5)
INTERVENTRICULAR SEPTUM: 0.97 CM (ref 0.6–1.1)
IVC DIAMETER: 1.68 CM
LACTATE SERPL-SCNC: 0.9 MMOL/L (ref 0.5–1.9)
LEFT INTERNAL DIMENSION IN SYSTOLE: 2.96 CM (ref 2.1–4)
LEFT VENTRICLE DIASTOLIC VOLUME INDEX: 60.08 ML/M2
LEFT VENTRICLE DIASTOLIC VOLUME: 100.33 ML
LEFT VENTRICLE END DIASTOLIC VOLUME APICAL 4 CHAMBER: 79.4 ML
LEFT VENTRICLE MASS INDEX: 122 G/M2
LEFT VENTRICLE SYSTOLIC VOLUME INDEX: 20.3 ML/M2
LEFT VENTRICLE SYSTOLIC VOLUME: 33.87 ML
LEFT VENTRICULAR INTERNAL DIMENSION IN DIASTOLE: 4.66 CM (ref 3.5–6)
LEFT VENTRICULAR MASS: 203.01 G
LVED V (TEICH): 100.33 ML
LVES V (TEICH): 33.87 ML
LYMPHOCYTES # BLD AUTO: 0.8 K/UL (ref 1–4.8)
LYMPHOCYTES NFR BLD: 9.1 % (ref 18–48)
MAGNESIUM SERPL-MCNC: 2.2 MG/DL (ref 1.6–2.6)
MCH RBC QN AUTO: 29.5 PG (ref 27–31)
MCHC RBC AUTO-ENTMCNC: 32.5 G/DL (ref 32–36)
MCV RBC AUTO: 91 FL (ref 82–98)
MONOCYTES # BLD AUTO: 0.8 K/UL (ref 0.3–1)
MONOCYTES NFR BLD: 9.5 % (ref 4–15)
NEUTROPHILS # BLD AUTO: 6.7 K/UL (ref 1.8–7.7)
NEUTROPHILS NFR BLD: 79.7 % (ref 38–73)
NRBC BLD-RTO: 2 /100 WBC
PLATELET # BLD AUTO: 105 K/UL (ref 150–450)
PMV BLD AUTO: 11.1 FL (ref 9.2–12.9)
POTASSIUM SERPL-SCNC: 4.6 MMOL/L (ref 3.5–5.1)
PROT SERPL-MCNC: 5.6 G/DL (ref 6–8.4)
RA PRESSURE ESTIMATED: 3 MMHG
RBC # BLD AUTO: 2.95 M/UL (ref 4–5.4)
SODIUM SERPL-SCNC: 140 MMOL/L (ref 136–145)
WBC # BLD AUTO: 8.43 K/UL (ref 3.9–12.7)
Z-SCORE OF LEFT VENTRICULAR DIMENSION IN END DIASTOLE: -0.02
Z-SCORE OF LEFT VENTRICULAR DIMENSION IN END SYSTOLE: 0.18

## 2024-07-20 PROCEDURE — 86920 COMPATIBILITY TEST SPIN: CPT | Performed by: INTERNAL MEDICINE

## 2024-07-20 PROCEDURE — 99900035 HC TECH TIME PER 15 MIN (STAT)

## 2024-07-20 PROCEDURE — 93308 TTE F-UP OR LMTD: CPT

## 2024-07-20 PROCEDURE — 25000003 PHARM REV CODE 250: Performed by: INTERNAL MEDICINE

## 2024-07-20 PROCEDURE — 36415 COLL VENOUS BLD VENIPUNCTURE: CPT | Performed by: HOSPITALIST

## 2024-07-20 PROCEDURE — 99233 SBSQ HOSP IP/OBS HIGH 50: CPT | Mod: ,,, | Performed by: INTERNAL MEDICINE

## 2024-07-20 PROCEDURE — 27000221 HC OXYGEN, UP TO 24 HOURS

## 2024-07-20 PROCEDURE — 94761 N-INVAS EAR/PLS OXIMETRY MLT: CPT

## 2024-07-20 PROCEDURE — 93308 TTE F-UP OR LMTD: CPT | Mod: 26,,, | Performed by: INTERNAL MEDICINE

## 2024-07-20 PROCEDURE — 85025 COMPLETE CBC W/AUTO DIFF WBC: CPT | Performed by: HOSPITALIST

## 2024-07-20 PROCEDURE — 80053 COMPREHEN METABOLIC PANEL: CPT | Performed by: HOSPITALIST

## 2024-07-20 PROCEDURE — 83735 ASSAY OF MAGNESIUM: CPT | Performed by: HOSPITALIST

## 2024-07-20 PROCEDURE — 12000002 HC ACUTE/MED SURGE SEMI-PRIVATE ROOM

## 2024-07-20 PROCEDURE — 82010 KETONE BODYS QUAN: CPT | Performed by: INTERNAL MEDICINE

## 2024-07-20 PROCEDURE — 83605 ASSAY OF LACTIC ACID: CPT | Performed by: INTERNAL MEDICINE

## 2024-07-20 PROCEDURE — 25000003 PHARM REV CODE 250: Performed by: HOSPITALIST

## 2024-07-20 PROCEDURE — 99900031 HC PATIENT EDUCATION (STAT)

## 2024-07-20 PROCEDURE — 63600175 PHARM REV CODE 636 W HCPCS: Performed by: INTERNAL MEDICINE

## 2024-07-20 RX ORDER — HYDROCODONE BITARTRATE AND ACETAMINOPHEN 500; 5 MG/1; MG/1
TABLET ORAL
Status: DISCONTINUED | OUTPATIENT
Start: 2024-07-20 | End: 2024-07-21

## 2024-07-20 RX ORDER — HYDRALAZINE HYDROCHLORIDE 25 MG/1
25 TABLET, FILM COATED ORAL EVERY 8 HOURS
Status: DISCONTINUED | OUTPATIENT
Start: 2024-07-20 | End: 2024-07-23 | Stop reason: HOSPADM

## 2024-07-20 RX ORDER — HYDRALAZINE HYDROCHLORIDE 25 MG/1
25 TABLET, FILM COATED ORAL EVERY 8 HOURS
Status: DISCONTINUED | OUTPATIENT
Start: 2024-07-20 | End: 2024-07-20

## 2024-07-20 RX ADMIN — DIPHENHYDRAMINE HYDROCHLORIDE 25 MG: 25 CAPSULE ORAL at 03:07

## 2024-07-20 RX ADMIN — SEVELAMER CARBONATE 800 MG: 800 TABLET, FILM COATED ORAL at 11:07

## 2024-07-20 RX ADMIN — GABAPENTIN 200 MG: 100 CAPSULE ORAL at 09:07

## 2024-07-20 RX ADMIN — INSULIN ASPART 2 UNITS: 100 INJECTION, SOLUTION INTRAVENOUS; SUBCUTANEOUS at 10:07

## 2024-07-20 RX ADMIN — SEVELAMER CARBONATE 800 MG: 800 TABLET, FILM COATED ORAL at 05:07

## 2024-07-20 RX ADMIN — LEVETIRACETAM 500 MG: 500 TABLET, FILM COATED ORAL at 09:07

## 2024-07-20 RX ADMIN — DIPHENHYDRAMINE HYDROCHLORIDE 25 MG: 25 CAPSULE ORAL at 08:07

## 2024-07-20 RX ADMIN — HYDRALAZINE HYDROCHLORIDE 25 MG: 25 TABLET ORAL at 04:07

## 2024-07-20 RX ADMIN — HYDRALAZINE HYDROCHLORIDE 25 MG: 25 TABLET ORAL at 09:07

## 2024-07-20 RX ADMIN — HYDROCODONE BITARTRATE AND ACETAMINOPHEN 1 TABLET: 10; 325 TABLET ORAL at 05:07

## 2024-07-20 RX ADMIN — HYDROCODONE BITARTRATE AND ACETAMINOPHEN 1 TABLET: 10; 325 TABLET ORAL at 09:07

## 2024-07-20 RX ADMIN — FLUOXETINE 20 MG: 20 CAPSULE ORAL at 09:07

## 2024-07-20 RX ADMIN — HYDRALAZINE HYDROCHLORIDE 25 MG: 25 TABLET ORAL at 12:07

## 2024-07-20 RX ADMIN — PANTOPRAZOLE SODIUM 40 MG: 40 TABLET, DELAYED RELEASE ORAL at 06:07

## 2024-07-20 RX ADMIN — SODIUM CHLORIDE 250 ML: 9 INJECTION, SOLUTION INTRAVENOUS at 08:07

## 2024-07-20 RX ADMIN — HYDRALAZINE HYDROCHLORIDE 25 MG: 25 TABLET ORAL at 06:07

## 2024-07-20 RX ADMIN — MUPIROCIN 1 G: 20 OINTMENT TOPICAL at 09:07

## 2024-07-20 RX ADMIN — APIXABAN 5 MG: 5 TABLET, FILM COATED ORAL at 09:07

## 2024-07-20 RX ADMIN — MORPHINE SULFATE 2 MG: 2 INJECTION, SOLUTION INTRAMUSCULAR; INTRAVENOUS at 06:07

## 2024-07-20 RX ADMIN — SEVELAMER CARBONATE 800 MG: 800 TABLET, FILM COATED ORAL at 08:07

## 2024-07-20 NOTE — ASSESSMENT & PLAN NOTE
Amlodipine 5 mg daily   Carvedilol 25 mg p.o. b.i.d.   Hydralazine 25 mg t.i.d.  isosorbide mononitrate 30 mg daily    They all are on hold for the time being if blood pressure better we will resume

## 2024-07-20 NOTE — PLAN OF CARE
Problem: Infection  Goal: Absence of Infection Signs and Symptoms  Outcome: Progressing     Problem: Skin Injury Risk Increased  Goal: Skin Health and Integrity  Outcome: Progressing     Problem: Hemodialysis  Goal: Safe, Effective Therapy Delivery  Outcome: Progressing  Goal: Absence of Infection Signs and Symptoms  Outcome: Progressing

## 2024-07-20 NOTE — PROGRESS NOTES
[facility] Medicine  Progress Note    Patient Name: Tabby Howard  MRN: [id number]  Patient Class: IP- Inpatient   Admission Date: 7/16/2024  Length of Stay: 2 days  Attending Physician: Armani Gurrola MD  Primary Care Provider: Melony Kim NP        Subjective:     Principal Problem:Abdominal pain        HPI:  This is a 34-year-old female with end-stage kidney disease and on dialysis presented to the hospital after not feeling well during dialysis.  In the middle of dialysis the patient had abdominal discomfort and complained of diarrhea.  Dialysis was stopped and the patient was sent to the hospital.  Workup included CT of the abdomen and pelvis which resulted in diffuse body wall edema, mild circumferential wall thickening of the distal descending colon, sigmoid colon and rectum.  There was also circumferential bladder wall thickening.  This process could correlate with infectious/inflammatory process.  Patient was given antibiotics empirically in the ER.  Patient has chronic pain and complaints of abdominal pain.  No major fever or chills.  Nephrology was informed and they would like the patient to be admitted so that she complete her dialysis tomorrow.    Overview/Hospital Course:  The patient was admitted and placed on prn pain meds. Her presentation was similar to past presentations of chronic abdominal pain. It was thought the findings on abdominal and pelvic CT were most consistent with fluid overload due to missed dialysis sessions. Nephrology  was consulted and dialysis orders were placed. Echo was ordered to evaluate pericardial effusion seen on CT, and revealed small effusion with tamponade. Cardiology consulted. Recommended continue dialysis to remove fluid, and felt this would be adequate to remove the pericardial effusion. Recommended repeating echo prior to discharge.     Interval History:     Patient was hypotensive and give 250 cc bolus and all blood pressure  medication will hold and blood pressure improved  Later see me urgent echo was done to rule out cardiac tamponade and negative for tamponade    Review of Systems  Objective:     Vital Signs (Most Recent):  Temp: 98.1 °F (36.7 °C) (07/20/24 1117)  Pulse: 85 (07/20/24 1117)  Resp: 17 (07/20/24 1117)  BP: (!) 97/59 (07/20/24 1117)  SpO2: 99 % (07/20/24 1117) Vital Signs (24h Range):  Temp:  [97.2 °F (36.2 °C)-98.7 °F (37.1 °C)] 98.1 °F (36.7 °C)  Pulse:  [57-91] 85  Resp:  [16-20] 17  SpO2:  [90 %-100 %] 99 %  BP: ()/() 97/59     Weight: 65 kg (143 lb 4.8 oz)  Body mass index is 26.21 kg/m².    Intake/Output Summary (Last 24 hours) at 7/20/2024 1435  Last data filed at 7/20/2024 1257  Gross per 24 hour   Intake 2033.33 ml   Output 4500 ml   Net -2466.67 ml         Physical Exam  Vitals and nursing note reviewed.   HENT:      Head: Normocephalic and atraumatic.   Eyes:      Conjunctiva/sclera: Conjunctivae normal.   Neck:      Vascular: No JVD.   Cardiovascular:      Rate and Rhythm: Normal rate.      Heart sounds: Normal heart sounds.   Pulmonary:      Effort: Pulmonary effort is normal.      Breath sounds: Normal breath sounds.   Abdominal:      Palpations: Abdomen is soft.   Musculoskeletal:         General: Normal range of motion.   Skin:     General: Skin is warm.   Neurological:      Mental Status: She is alert and oriented to person, place, and time.   Psychiatric:         Mood and Affect: Mood normal.             Significant Labs: All pertinent labs within the past 24 hours have been reviewed.  BMP:   Recent Labs   Lab 07/20/24  0611   *      K 4.6      CO2 19*   BUN 31*   CREATININE 3.2*   CALCIUM 8.6*   MG 2.2     CBC:   Recent Labs   Lab 07/19/24  1115 07/20/24  0611   WBC 3.06* 8.43   HGB 6.7* 8.7*   HCT 20.2* 26.8*   PLT 64* 105*     CMP:   Recent Labs   Lab 07/18/24  1937 07/19/24  1115 07/20/24  0611    136 140   K 4.2 4.6 4.6    101 101   CO2 25 22* 19*   GLU 85  83 209*   BUN 29* 32* 31*   CREATININE 4.2* 4.4* 3.2*   CALCIUM 8.1* 8.0* 8.6*   PROT  --  5.5* 5.6*   ALBUMIN  --  3.4* 3.3*   BILITOT  --  3.8* 3.9*   ALKPHOS  --  135 132   AST  --  51* 36   ALT  --  79* 65*   ANIONGAP 11 13 20*       Significant Imaging: I have reviewed all pertinent imaging results/findings within the past 24 hours.    Assessment/Plan:      * Abdominal pain  Abdominal exam is nonsurgical abdomen and CT scan with circumferential wall thickening of distal descending colon, sigmoid colon, and rectum.  The group B inflammatory process infectious process versus dialysis patient can have fluid retention in this areas  Agree with Currently with holding further antibiotics  Patient is tolerating the diet without any problem    Seizures    Continue Keppra    MDD (major depressive disorder)  Fluoxetine 20 mg daily    Hypertension  Amlodipine 5 mg daily   Carvedilol 25 mg p.o. b.i.d.   Hydralazine 25 mg t.i.d.  isosorbide mononitrate 30 mg daily    They all are on hold for the time being if blood pressure better we will resume    Deep vein thrombosis (DVT) of non-extremity vein  Continue apixaban      ESRD (end stage renal disease)  Nephrology  on the case   Sevelamer    Pericardial effusion  CT of the chest showed moderate to severe pericardial effusion.  Echo revealed small effusion   Repeat echocardiogram was done with moderate pericardial effusion with no signs of tamponade  We will do dialysi /ultrafiltration    Diabetes  Lantus 22 units daily   Insulin sliding scale      VTE Risk Mitigation (From admission, onward)           Ordered     apixaban tablet 5 mg  2 times daily         07/17/24 1424     Reason for No Pharmacological VTE Prophylaxis  Once        Question:  Reasons:  Answer:  Already adequately anticoagulated on oral Anticoagulants    07/16/24 2030     IP VTE HIGH RISK PATIENT  Once         07/16/24 2030     Place sequential compression device  Until discontinued         07/16/24 2030                     Discharge Planning   TATIANA: 7/22/2024     Code Status: Full Code   Is the patient medically ready for discharge?:     Reason for patient still in hospital (select all that apply): Treatment  Discharge Plan A: Home with family                  Armani Gurrola MD  Department of Hospital Medicine   [facility]

## 2024-07-20 NOTE — ASSESSMENT & PLAN NOTE
Abdominal exam is nonsurgical abdomen and CT scan with circumferential wall thickening of distal descending colon, sigmoid colon, and rectum.  The group B inflammatory process infectious process versus dialysis patient can have fluid retention in this areas  Agree with Currently with holding further antibiotics  Patient is tolerating the diet without any problem   Attempted to call [facility] to inform them that pt has been transferred to [facility] and to send his belongings over there but there was no answer, called twice. Charge nurse made aware of this.

## 2024-07-20 NOTE — PROGRESS NOTES
[facility]  Department of Cardiology  Progress Note    PATIENT NAME: Tabby Howard  MRN: [id number]  TODAY'S DATE: 07/20/2024  ADMIT DATE: 7/16/2024    SUBJECTIVE     PRINCIPLE PROBLEM: Abdominal pain    This is a 34-year-old female with end-stage kidney disease and on dialysis presented to the hospital after not feeling well during dialysis.  In the middle of dialysis the patient had abdominal discomfort and complained of diarrhea.  Dialysis was stopped and the patient was sent to the hospital.  Workup included CT of the abdomen and pelvis which resulted in diffuse body wall edema, mild circumferential wall thickening of the distal descending colon, sigmoid colon and rectum.  There was also circumferential bladder wall thickening.  This process could correlate with infectious/inflammatory process.  Patient was given antibiotics empirically in the ER.  Patient has chronic pain and complaints of abdominal pain.  No major fever or chills.  Nephrology was informed and they would like the patient to be admitted so that she complete her dialysis tomorrow.     Overview/Hospital Course:  The patient was admitted and placed on prn pain meds. Her presentation was similar to past presentations of chronic abdominal pain. It was thought the findings on abdominal and pelvic CT were most consistent with fluid overload due to missed dialysis sessions. Nephrology  was consulted and dialysis orders were placed. Echo was ordered to evaluate pericardial effusion seen on CT, and revealed small effusion with tamponade. Cardiology consulted. Recommended continue dialysis to remove fluid, and felt this would be adequate to remove the pericardial effusion. Recommended repeating echo prior to discharge.     INTERVAL HISTORY:    7/20/2024  Patient is currently resting hemodynamically stable not in any acute distress.  She states he feels much better today than she did yesterday.  Denies any chest pain or tightness or heaviness  denies any difficulty in breathing    Review of patient's allergies indicates:   Allergen Reactions    Droperidol Hives    Penicillins Hives       REVIEW OF SYSTEMS  CARDIOVASCULAR: No recent chest pain, palpitations, arm, neck, or jaw pain  RESPIRATORY:  Much improved shortness a breath  : No blood in the urine  GI: No Nausea, vomiting, constipation, diarrhea, blood, or reflux.  MUSCULOSKELETAL: No myalgias  NEURO: No lightheadedness or dizziness  EYES: No Double vision, blurry, vision or headache     OBJECTIVE     VITAL SIGNS (Most Recent)  Temp: 98.1 °F (36.7 °C) (07/20/24 1117)  Pulse: 85 (07/20/24 1117)  Resp: 17 (07/20/24 1117)  BP: (!) 97/59 (07/20/24 1117)  SpO2: 99 % (07/20/24 1117)    VENTILATION STATUS  Resp: 17 (07/20/24 1117)  SpO2: 99 % (07/20/24 1117)           I & O (Last 24H):  Intake/Output Summary (Last 24 hours) at 7/20/2024 1132  Last data filed at 7/20/2024 1121  Gross per 24 hour   Intake 2008.33 ml   Output 4500 ml   Net -2491.67 ml       WEIGHTS  Wt Readings from Last 1 Encounters:   07/16/24 2247 65 kg (143 lb 4.8 oz)   07/16/24 1440 53.1 kg (117 lb)       PHYSICAL EXAM  CONSTITUTIONAL: Well built, well nourished in no apparent distress  NECK: no carotid bruit, no JVD  LUNGS:  Bilateral decreased breath sounds in both lung bases  CHEST WALL: no tenderness  HEART: regular rate and rhythm, systolic murmur audible.    ABDOMEN: soft, bowel sounds audible.    EXTREMITIES: Extremities normal, no edema  NEURO: AAO X 3    SCHEDULED MEDS:   apixaban  5 mg Oral BID    epoetin teresa-epbx  10,000 Units Intravenous Every Mon, Wed, Fri    FLUoxetine  20 mg Oral Daily    fluticasone propionate  1 spray Each Nostril Daily    gabapentin  200 mg Oral BID    labetalol  20 mg Intravenous Once    levETIRAcetam  500 mg Oral BID    mupirocin   Nasal BID    pantoprazole  40 mg Oral Before breakfast    promethazine (PHENERGAN) 12.5 mg in 0.9% NaCl 50 mL IVPB  12.5 mg Intravenous Once    sevelamer carbonate  800 mg  Oral TID WM       CONTINUOUS INFUSIONS:   D10W   Intravenous Continuous 50 mL/hr at 07/18/24 2343 New Bag at 07/18/24 2343       PRN MEDS:  Current Facility-Administered Medications:     0.9%  NaCl infusion (for blood administration), , Intravenous, Q24H PRN    0.9%  NaCl infusion (for blood administration), , Intravenous, Q24H PRN    0.9%  NaCl infusion (for blood administration), , Intravenous, Q24H PRN    acetaminophen, 650 mg, Oral, Q8H PRN    acetaminophen, 650 mg, Oral, Q4H PRN    albuterol sulfate, 2.5 mg, Nebulization, Q4H PRN    aluminum-magnesium hydroxide-simethicone, 30 mL, Oral, QID PRN    dextrose 50%, 12.5 g, Intravenous, PRN    dextrose 50%, 25 g, Intravenous, PRN    diphenhydrAMINE, 25 mg, Oral, Q6H PRN    glucagon (human recombinant), 1 mg, Intramuscular, PRN    glucose, 16 g, Oral, PRN    glucose, 24 g, Oral, PRN    HYDROcodone-acetaminophen, 1 tablet, Oral, Q4H PRN    insulin aspart U-100, 0-5 Units, Subcutaneous, QID (AC + HS) PRN    magnesium oxide, 800 mg, Oral, PRN    magnesium oxide, 800 mg, Oral, PRN    melatonin, 6 mg, Oral, Nightly PRN    morphine, 2 mg, Intravenous, Q4H PRN    naloxone, 0.02 mg, Intravenous, PRN    nitroGLYCERIN, 0.4 mg, Sublingual, Q5 Min PRN    ondansetron, 4 mg, Intravenous, Q6H PRN    potassium bicarbonate, 35 mEq, Oral, PRN    potassium bicarbonate, 50 mEq, Oral, PRN    potassium bicarbonate, 60 mEq, Oral, PRN    potassium, sodium phosphates, 2 packet, Oral, PRN    potassium, sodium phosphates, 2 packet, Oral, PRN    potassium, sodium phosphates, 2 packet, Oral, PRN    sodium chloride 0.9%, 10 mL, Intravenous, Q12H PRN    LABS AND DIAGNOSTICS     CBC LAST 3 DAYS  Recent Labs   Lab 07/18/24  0444 07/19/24  1115 07/20/24  0611   WBC 3.69* 3.06* 8.43   RBC 2.53* 2.26* 2.95*   HGB 7.5* 6.7* 8.7*   HCT 23.1* 20.2* 26.8*   MCV 91 89 91   MCH 29.6 29.6 29.5   MCHC 32.5 33.2 32.5   RDW 17.9* 18.3* 19.3*   PLT 62* 64* 105*   MPV 11.3 11.3 11.1   GRAN 77.0*  2.8 66.0  2.0  "79.7*  6.7   LYMPH 12.7*  0.5* 23.5  0.7* 9.1*  0.8*   MONO 8.1  0.3 8.2  0.3 9.5  0.8   BASO 0.01 0.01 0.02   NRBC 0 1* 2*       COAGULATION LAST 3 DAYS  Recent Labs   Lab 07/16/24  1610   INR 1.0       CHEMISTRY LAST 3 DAYS  Recent Labs   Lab 07/16/24  1610 07/17/24  0620 07/18/24  0444 07/18/24  1448 07/18/24  1937 07/19/24  1115 07/20/24  0611      < > 136  --  138 136 140   K 4.2   < > 5.2*  --  4.2 4.6 4.6      < > 98  --  102 101 101   CO2 27   < > 25  --  25 22* 19*   ANIONGAP 11   < > 13  --  11 13 20*   BUN 52*   < > 65*  --  29* 32* 31*   CREATININE 6.0*   < > 7.2*  --  4.2* 4.4* 3.2*   *   < > 42*   < > 85 83 209*   CALCIUM 8.0*   < > 7.9*  --  8.1* 8.0* 8.6*   MG 2.1  2.1   < > 2.2  --  2.0 2.1 2.2   ALBUMIN 3.5   < > 3.4*  --   --  3.4* 3.3*   BILITOT 1.2*   < > 1.9*  --   --  3.8* 3.9*   PROT 5.6*   < > 5.5*  --   --  5.5* 5.6*   ALKPHOS 79   < > 114  --   --  135 132   ALT 32   < > 82*  --   --  79* 65*   AST 28   < > 127*  --   --  51* 36   LIPASE 17  --   --   --   --   --   --     < > = values in this interval not displayed.       CARDIAC PROFILE LAST 3 DAYS  Recent Labs   Lab 07/16/24  1610 07/16/24  1809 07/16/24  2327 07/17/24  0620 07/18/24  1937   BNP 1,875*  --   --   --   --      --   --   --   --    TROPONINIHS 86.7*   < > 72.8* 62.3* 53.5*    < > = values in this interval not displayed.       ENDOCRINE LAST 3 DAYS  Recent Labs   Lab 07/16/24  1610   TSH 1.355       LAST ARTERIAL BLOOD GAS  ABG  No results for input(s): "PH", "PO2", "PCO2", "HCO3", "BE" in the last 168 hours.    LAST 7 DAYS MICROBIOLOGY   Microbiology Results (last 7 days)       Procedure Component Value Units Date/Time    Blood culture #1 **CANNOT BE ORDERED STAT** [[id number]] Collected: 07/16/24 1632    Order Status: Completed Specimen: Blood Updated: 07/19/24 1832     Blood Culture, Routine No Growth to date      No Growth to date      No Growth to date      No Growth to date    Blood " culture #2 **CANNOT BE ORDERED STAT** [[id number]] Collected: 07/16/24 1703    Order Status: Completed Specimen: Blood Updated: 07/19/24 1832     Blood Culture, Routine No Growth to date      No Growth to date      No Growth to date      No Growth to date    Group A Strep, Molecular [[id number]] Collected: 07/16/24 1824    Order Status: Completed Specimen: Throat Updated: 07/16/24 1848     Group A Strep, Molecular Negative     Comment: Arcanobacterium haemolyticum and Beta Streptococcus group C   and G will not be detected by this test method.  Please order   Throat Culture ([id number]) if suspected.         Respiratory Infection Panel (PCR), Nasopharyngeal [[id number]] Collected: 07/16/24 1824    Order Status: Completed Specimen: Nasopharyngeal Swab Updated: 07/16/24 1825     Respiratory Infection Panel Source NP swab    Narrative:      Respiratory Infection Panel source->NP Swab            MOST RECENT IMAGING  X-Ray Chest 1 View  Narrative: EXAMINATION:  XR CHEST 1 VIEW    CLINICAL HISTORY:  chest pain;    TECHNIQUE:  Single frontal view of the chest was performed.    COMPARISON:  07/16/2024    FINDINGS:  Enlarged cardiac silhouette with pulmonary vascular congestion.  No focal consolidation.  Impression: As above.    Electronically signed by: Jeovany Herrmann  Date:    07/18/2024  Time:    21:06      [facility]  Results for orders placed during the hospital encounter of 07/16/24    Echo    Interpretation Summary    Left Ventricle: The left ventricle is normal in size. Increased wall thickness. There is concentric hypertrophy. There is normal systolic function with a visually estimated ejection fraction of 55 - 60%. Grade III diastolic dysfunction. E/A ratio is 2.22.    Right Ventricle: Right ventricular enlargement. Systolic function is normal.    Right Atrium: Right atrium is moderately dilated.    Aortic Valve: The aortic valve is a trileaflet valve.    Tricuspid Valve: There is moderate regurgitation. There is  moderate pulmonary hypertension. The estimated PA systolic pressure is at least 50 mmHg.    Pulmonic Valve: There is mild to moderate regurgitation.    Pericardium: There is a small circumferential effusion. Pericardial effusion is echolucent. Indication of cardiac tamponade. Earlt Evidence includes greater than 40% tricuspid and mitral transvalvular velocity variation during respiration.suggest short term followup ECHO      CURRENT/PREVIOUS VISIT EKG  Results for orders placed or performed during the hospital encounter of 07/16/24   EKG 12-lead    Collection Time: 07/18/24  3:41 PM   Result Value Ref Range    QRS Duration 80 ms    OHS QTC Calculation 460 ms    Narrative    Test Reason : R07.9,    Vent. Rate : 092 BPM     Atrial Rate : 092 BPM     P-R Int : 156 ms          QRS Dur : 080 ms      QT Int : 372 ms       P-R-T Axes : 035 -32 060 degrees     QTc Int : 460 ms    Normal sinus rhythm  Left axis deviation  Low voltage QRS  Abnormal ECG  When compared with ECG of 17-JUL-2024 09:52,  No significant change was found    Referred By: [email or web address]   SELF           Confirmed By:        ASSESSMENT/PLAN:     Active Hospital Problems    Diagnosis    *Abdominal pain    Seizures    MDD (major depressive disorder)    Hypertension    Deep vein thrombosis (DVT) of non-extremity vein    ESRD (end stage renal disease)    Diabetes    Pericardial effusion       ASSESSMENT & PLAN:   1. Pericardial effusion  2. End-stage renal disease on hemodialysis   3. Noncompliance with dialysis    4. Deep vein thrombosis and   5. Abdominal pain      RECOMMENDATIONS:    1. Reviewed the echocardiogram g from yesterday and she has a small pericardial effusion without any diastolic compromise or right ventricle or right atrial collapse.  Her respiratory variation of the mitral inflow and tricuspid inflow was exaggerated due to significant hypovolemia after the hemodialysis and hypotension.    2. Patient had a small pericardial effusion yesterday  and not a candidate for pericardiocentesis.  With hemodialysis on regular basis pericardial effusion can be treated effectively.    3. She had a repeat echocardiogram done today which shows tiny increase in her pericardial effusion and there is no evidence of tamponade no evidence of right atrial or right ventricular collapse and no evidence of respiratory variation with mitral and tricuspid inflow.      4. Consider repeating echocardiography next week.        Cuong Smith MD  Date of Service: 07/20/2024  11:32 AM

## 2024-07-20 NOTE — ASSESSMENT & PLAN NOTE
CT of the chest showed moderate to severe pericardial effusion.  Echo revealed small effusion   Repeat echocardiogram was done with moderate pericardial effusion with no signs of tamponade  We will do dialysi /ultrafiltration

## 2024-07-20 NOTE — NURSING
Consent and Hep b status verified, removed 3600 uf net, patient received 1 unit of rbcs on HD. Patient screamed and moved around constantly during treatment, treatment extended to 4.75 hr due to patient needing blood and no iv access because patient pulled out iv, pulled off telle monitor and constantly trying to make herself throw up and wanting pain medicine. When pulling needles after treatment, AVF arterial and venous continued to bleed for over 150 min off and on, due to patient wanting pain medicine and thrashing in bed. Dr Shaw agreed bleeding stopped, post thrill and bruit noted, patient remained in HD room until 1830, Report given to Vanessa and Fidelina, floor nurses      07/19/24 1605   Handoff Report   Received From Eun   Given To Vanessa   Vital Signs   Temp 98.6 °F (37 °C)   Temp Source Oral   Pulse (!) 58   Heart Rate Source Monitor   Resp 18   SpO2 100 %   Pulse Oximetry Type Intermittent   Probe Placed On (Pulse Ox) Right:;finger   Device (Oxygen Therapy) room air   BP (!) 151/80   BP Location Right arm   BP Method Automatic   Patient Position Lying   Assessments (Pre/Post)   Consent Obtained yes   Safety vein preservation armband present   Date Hepatitis Profile Obtained 02/27/24   Blood Liters Processed (BLP) 85.9   Transport Modality bed   Level of Consciousness (AVPU) alert   Dialyzer Clearance mildly streaked   Pain   Preferred Pain Scale number (Numeric Rating Pain Scale)   Comfort/Acceptable Pain Level 3   Pain Body Location - Side Bilateral   Pain Body Location abdomen   Pain Rating (0-10): Rest 10   Pain Rating (0-10): Activity 10   Frequency constant   Pain Management Interventions quiet environment facilitated;position adjusted;pillow support provided   Pain/Comfort Interventions   Fever Reduction/Comfort Measures lightweight clothing;lightweight bedding   Pre-Hemodialysis Assessment   Additional Dialysis Information Needed Yes   Patient Status Departed   Treatment Consent Verified Yes    Treatment Status Completed        Hemodialysis AV Fistula Left upper arm   No placement date or time found.   Location: Left upper arm   Site Assessment Clean;Dry;Intact   Patency Present;Thrill;Bruit   Status Deaccessed   Dressing Intervention First dressing   Site Condition Bleeding   Dressing Gauze   Post-Hemodialysis Assessment   Rinseback Volume (mL) 250 mL   Blood Volume Processed (Liters) 85.9 L   Dialyzer Clearance Lightly streaked   Duration of Treatment 285 minutes   Additional Fluid Intake (mL) 900 mL   Total UF (mL) 4500 mL   Net Fluid Removal 3600   Patient Response to Treatment see notes   Post-Treatment Weight 61.4 kg (135 lb 5.8 oz)   Treatment Weight Change -3.5   Arterial bleeding stop time (min) 150 min   Venous bleeding stop time (min) 150 min   Post-Hemodialysis Comments see notes

## 2024-07-20 NOTE — PROGRESS NOTES
INPATIENT NEPHROLOGY Progress Note  [facility]    Patient Name: Tabby Howard  Date: 07/20/2024    Reason for consultation: ESRD    Chief Complaint:   Chief Complaint   Patient presents with    Abdominal Pain     Patient was at dialysis and complained of lower back pain, did not finish  only took 3 kg at dialysis.  Has missed about 8 appointments in the last 6 weeks of dialysis. CBG was 58, D10 given.  SpO2 did drop from 97 to 79% on room.  Patient is on 3 liters.         History of Present Illness:  This is a 34-year-old female with end-stage kidney disease and on dialysis presented to the hospital after not feeling well during dialysis. In the middle of dialysis the patient had abdominal discomfort and complained of diarrhea. Dialysis was stopped and the patient was sent to the hospital. She got 2L off. She was 12L above dry weight. Workup included CT of the abdomen and pelvis which resulted in diffuse body wall edema, mild circumferential wall thickening of the distal descending colon, sigmoid colon and rectum. There was also circumferential bladder wall thickening. This process could correlate with infectious/inflammatory process. Patient was given antibiotics empirically in the ER. Patient has chronic pain and complaints of abdominal pain. No major fever or chills. Consulted for dialysis.    Interval History:  7/17- IUF today, HD/UF MWF  7/18  had IUF only yesterday, K+ a little elevated, will run regular tx today.  Pt starting to feel better w/fluid off.  Hgb dropping, ordered epo w/HD on MWF.  7/19  Patient seen on hemodialysis for uremic clearance and ultrafiltration.    7/20 low BP this AM- holding all BP meds- repeating echo- spoke with Dr. Tompkins and pericardial effusion small on last echo- Dr. Smith will see patient today    Plan of Care:    Assessment:  ESRD on HD MWF  Pericardial effusion- uremia, volume overload  HTN/D CHF  SHPT  Anemia of CKD  Acidosis     Plan:    - routine HD  MWF- working on daily treatments due to pericardial effusion- however BP low today- holding home BP meds- recheck echo for tamponade- hold RRT today  - renal diet, 1.5L fluid restriction  - binder with meals  - SHELLEY with HD  - check lactic acid and BHB    Thank you for allowing us to participate in this patient's care. We will continue to follow.    Vital Signs:  Temp Readings from Last 3 Encounters:   07/20/24 98.5 °F (36.9 °C) (Tympanic)   06/23/24 98.2 °F (36.8 °C) (Oral)   05/13/24 98.3 °F (36.8 °C) (Oral)       Pulse Readings from Last 3 Encounters:   07/20/24 84   06/23/24 90   05/13/24 75       BP Readings from Last 3 Encounters:   07/20/24 99/78   06/23/24 (!) 169/85   05/13/24 (!) 149/79       Weight:  Wt Readings from Last 3 Encounters:   07/16/24 65 kg (143 lb 4.8 oz)   07/17/24 64.9 kg (143 lb)   06/22/24 52.6 kg (116 lb)       Medications:  Scheduled Meds:   apixaban  5 mg Oral BID    epoetin teresa-epbx  10,000 Units Intravenous Every Mon, Wed, Fri    FLUoxetine  20 mg Oral Daily    fluticasone propionate  1 spray Each Nostril Daily    gabapentin  200 mg Oral BID    labetalol  20 mg Intravenous Once    levETIRAcetam  500 mg Oral BID    mupirocin   Nasal BID    pantoprazole  40 mg Oral Before breakfast    promethazine (PHENERGAN) 12.5 mg in 0.9% NaCl 50 mL IVPB  12.5 mg Intravenous Once    sevelamer carbonate  800 mg Oral TID WM     Continuous Infusions:   D10W   Intravenous Continuous 50 mL/hr at 07/18/24 2343 New Bag at 07/18/24 2343     PRN Meds:.  Current Facility-Administered Medications:     0.9%  NaCl infusion (for blood administration), , Intravenous, Q24H PRN    0.9%  NaCl infusion (for blood administration), , Intravenous, Q24H PRN    0.9%  NaCl infusion (for blood administration), , Intravenous, Q24H PRN    acetaminophen, 650 mg, Oral, Q8H PRN    acetaminophen, 650 mg, Oral, Q4H PRN    albuterol sulfate, 2.5 mg, Nebulization, Q4H PRN    aluminum-magnesium hydroxide-simethicone, 30 mL, Oral, QID  PRN    dextrose 50%, 12.5 g, Intravenous, PRN    dextrose 50%, 25 g, Intravenous, PRN    diphenhydrAMINE, 25 mg, Oral, Q6H PRN    glucagon (human recombinant), 1 mg, Intramuscular, PRN    glucose, 16 g, Oral, PRN    glucose, 24 g, Oral, PRN    HYDROcodone-acetaminophen, 1 tablet, Oral, Q4H PRN    insulin aspart U-100, 0-5 Units, Subcutaneous, QID (AC + HS) PRN    magnesium oxide, 800 mg, Oral, PRN    magnesium oxide, 800 mg, Oral, PRN    melatonin, 6 mg, Oral, Nightly PRN    morphine, 2 mg, Intravenous, Q4H PRN    naloxone, 0.02 mg, Intravenous, PRN    nitroGLYCERIN, 0.4 mg, Sublingual, Q5 Min PRN    ondansetron, 4 mg, Intravenous, Q6H PRN    potassium bicarbonate, 35 mEq, Oral, PRN    potassium bicarbonate, 50 mEq, Oral, PRN    potassium bicarbonate, 60 mEq, Oral, PRN    potassium, sodium phosphates, 2 packet, Oral, PRN    potassium, sodium phosphates, 2 packet, Oral, PRN    potassium, sodium phosphates, 2 packet, Oral, PRN    sodium chloride 0.9%, 10 mL, Intravenous, Q12H PRN  No current facility-administered medications on file prior to encounter.     Current Outpatient Medications on File Prior to Encounter   Medication Sig Dispense Refill    acetaZOLAMIDE (DIAMOX) 250 MG tablet take 1 tablet by mouth 3 times a day (Patient taking differently: Take 250 mg by mouth 3 (three) times daily.) 90 tablet 3    albuterol (VENTOLIN HFA) 90 mcg/actuation inhaler Inhale 2 puffs every 4 hours by inhalation route. (Patient taking differently: Inhale 2 puffs into the lungs every 4 (four) hours as needed for Shortness of Breath or Wheezing.) 8 g 2    amLODIPine (NORVASC) 5 MG tablet Take 1 tablet (5 mg total) by mouth once daily. 30 tablet 11    apixaban (ELIQUIS) 5 mg Tab Take 1 tablet (5 mg total) by mouth 2 (two) times daily. Patient w/ thrombocytopenia <50, so held during admission, follow-up with hematologist about restarting 180 tablet 1    atropine 1% (ISOPTO ATROPINE) 1 % Drop Place 1 drop into the left eye 2 (two)  times a day. 15 mL 3    blood sugar diagnostic (TRUE METRIX GLUCOSE TEST STRIP) Strp Use 1 strip to check blood glucose three times daily 100 each 11    blood-glucose meter (TRUE METRIX GLUCOSE METER) Misc Use to check blood glucose 1 each 0    blood-glucose meter,continuous (DEXCOM ) Misc USE WITH SENSORS 1 each 0    blood-glucose sensor Heather CHANGE EVERY 10 DAYS 3 each 0    brimonidine 0.15 % OPTH DROP (ALPHAGAN) 0.15 % ophthalmic solution Place 1 drop into both eyes 3 (three) times daily. 15 mL 3    brinzolamide (AZOPT) 1 % ophthalmic suspension Place1 drop in left eye 3 times a day for 30 days (Patient taking differently: Place 1 drop into both eyes 3 (three) times daily.) 15 mL 6    busPIRone (BUSPAR) 10 MG tablet Take 1 tablet (10 mg total) by mouth 3 (three) times daily as needed (anxiety). 60 tablet 5    carvediloL (COREG) 25 MG tablet take 1 tablet Oral 2 times daily (Patient taking differently: Take 25 mg by mouth 2 (two) times daily.) 60 tablet 0    cetirizine (ZYRTEC) 10 MG tablet Take 1 tablet every day by oral route. (Patient taking differently: Take 10 mg by mouth once daily.) 30 tablet 0    dorzolamide-timolol 2-0.5% (COSOPT) 22.3-6.8 mg/mL ophthalmic solution place 1 drop in left eye twice a day  for 30 days (Patient taking differently: Place 1 drop into both eyes 2 (two) times daily.) 10 mL 0    FLUoxetine 20 MG capsule Take 1 capsule every day by oral route for 90 days. (Patient taking differently: Take 20 mg by mouth once daily.) 90 capsule 1    FLUoxetine 40 MG capsule Take 1 capsule every day by oral route. (Patient taking differently: Take 40 mg by mouth once daily.) 30 capsule 2    fluticasone propionate (FLONASE ALLERGY RELIEF) 50 mcg/actuation nasal spray [place] 1 spray every day by intranasal route. (Patient taking differently: 1 spray by Each Nostril route once daily.) 16 g 2    gabapentin (NEURONTIN) 300 MG capsule Take 2 capsules twice a day by oral route for 90 days. (Patient  "taking differently: Take 600 mg by mouth 2 (two) times daily.) 360 capsule 1    hydrALAZINE (APRESOLINE) 25 MG tablet take 1 tablet by mouth every 8 hours (Patient taking differently: Take 25 mg by mouth every 8 (eight) hours.) 90 tablet 0    insulin detemir U-100, Levemir, (LEVEMIR FLEXTOUCH U100 INSULIN) 100 unit/mL (3 mL) InPn pen Inject 22 Units into the skin every evening. Patient not needing insulin in-patient. Follow-up with PCP for hospital follow-up and restarting the medication. Or restart medication if glucose >200 consistently resume home insulin regimen. Or if glucose is persistently less than 100, decrease by 5 units until following up with PCP 15 mL 1    insulin glargine U-100, Lantus, 100 unit/mL injection inject 13 unit subcutaneously 2 times daily (Patient taking differently: Inject 13 Units into the skin 2 (two) times a day.) 10 mL 0    isosorbide mononitrate (IMDUR) 30 MG 24 hr tablet Take 1 tablet (30 mg total) by mouth once daily. 90 tablet 1    lancets (TRUEPLUS LANCETS) 33 gauge Misc Use 1 to check blood glucose three times daily 100 each 11    lancets 33 gauge Misc Use to test twice daily 100 each 5    levETIRAcetam (KEPPRA) 500 MG Tab Take 1 tablet (500 mg total) by mouth 2 (two) times daily. 60 tablet 11    LORazepam (ATIVAN) 0.5 MG tablet Take 1 tablet 3 times a day by oral route for 7 days, for severe panic. (Patient taking differently: Take 0.5 mg by mouth 3 (three) times daily.) 21 tablet 2    ondansetron (ZOFRAN-ODT) 4 MG TbDL Place 1 tablet (4 mg) on or under the tongue every 8 hours for 10 days. (Patient taking differently: Take 8 mg by mouth every 8 (eight) hours as needed.) 24 tablet 2    oxyCODONE-acetaminophen (PERCOCET)  mg per tablet Take 1 tablet by mouth every 4 (four) hours as needed for Pain. 42 tablet 0    pantoprazole (PROTONIX) 40 MG tablet Take 1 tablet (40 mg total) by mouth once daily. 30 tablet 0    pen needle, diabetic 31 gauge x 3/16" Ndle Use as directed " with insulin once daily 100 each 0    prednisoLONE acetate (PRED FORTE) 1 % DrpS Place 1 drop into the left eye 2 (two) times daily. 5 mL 3    predniSONE (DELTASONE) 20 MG tablet take 3 tablets Oral Daily,x30 day(s) (Patient taking differently: Take 20 mg by mouth once daily.) 90 tablet 0    sevelamer carbonate (RENVELA) 800 mg Tab Take 1 tablet (800 mg total) by mouth 3 (three) times daily with meals. 180 tablet 11    sucralfate (CARAFATE) 100 mg/mL suspension Take 10 mL 4 times a day by oral route before meals for 30 days. (Patient taking differently: Take 10 mLs by mouth 4 (four) times daily with meals and nightly.) 1200 mL 1    timolol maleate 0.5% (TIMOPTIC) 0.5 % Drop Place 1 drop in left eye 2 times a day for 30 days (Patient taking differently: Place 1 drop into both eyes 2 (two) times daily.) 5 mL 6    tobramycin-dexAMETHasone 0.3-0.1% (TOBRADEX) 0.3-0.1 % DrpS 1 drop Eye-Left every 6 hours for 5 day(s) 5 mL 0    [DISCONTINUED] atenoloL (TENORMIN) 50 MG tablet Take 1 tablet (50 mg total) by mouth every other day. 45 tablet 3    [DISCONTINUED] omeprazole (PRILOSEC) 20 MG capsule Take 2 capsules (40 mg total) by mouth once daily. for 10 days 20 capsule 0       Allergies:  Droperidol and Penicillins    Past Family History:  Reviewed; refer to Hospitalist Admission Note    Review of Systems:  neg    Physical Exam:  Constitutional: nad, aao x 3  Heart: rrr, no m/r/g, wwp, + edema  Lungs: ctab, no w/r/r/c, no lb  Abdomen: s/nt/d, +BS      Results:  Lab Results   Component Value Date     07/20/2024    K 4.6 07/20/2024     07/20/2024    CO2 19 (L) 07/20/2024    BUN 31 (H) 07/20/2024    CREATININE 3.2 (H) 07/20/2024    CALCIUM 8.6 (L) 07/20/2024    ANIONGAP 20 (H) 07/20/2024    ESTGFRAFRICA 19 (L) 09/03/2022    EGFRNONAA 18 (A) 07/31/2022       Lab Results   Component Value Date    CALCIUM 8.6 (L) 07/20/2024    PHOS 7.5 (H) 06/22/2024       Recent Labs   Lab 07/20/24  0611   WBC 8.43   RBC 2.95*   HGB  8.7*   HCT 26.8*   *   MCV 91   MCH 29.5   MCHC 32.5     Patient care time was spent personally by me on the following activities: > 35 minutes  Obtaining a history.  Examination of patient.  Providing medical care at the patients bedside.  Developing a treatment plan with patient or surrogate and bedside caregivers.  Ordering and reviewing laboratory studies, radiographic studies, pulse oximetry.  Ordering and performing treatments and interventions.  Evaluation of patient's response to treatment.  Discussions with consultants while on the unit and immediately available to the patient.  Re-evaluation of the patient's condition.  Documentation in the medical record.      Prateek Clark MD    [facility]  [address] [place] [id number]  [phone] (p)  [phone] (f)

## 2024-07-20 NOTE — SUBJECTIVE & OBJECTIVE
Interval History:     Patient was hypotensive and give 250 cc bolus and all blood pressure medication will hold and blood pressure improved  Later see me urgent echo was done to rule out cardiac tamponade and negative for tamponade    Review of Systems  Objective:     Vital Signs (Most Recent):  Temp: 98.1 °F (36.7 °C) (07/20/24 1117)  Pulse: 85 (07/20/24 1117)  Resp: 17 (07/20/24 1117)  BP: (!) 97/59 (07/20/24 1117)  SpO2: 99 % (07/20/24 1117) Vital Signs (24h Range):  Temp:  [97.2 °F (36.2 °C)-98.7 °F (37.1 °C)] 98.1 °F (36.7 °C)  Pulse:  [57-91] 85  Resp:  [16-20] 17  SpO2:  [90 %-100 %] 99 %  BP: ()/() 97/59     Weight: 65 kg (143 lb 4.8 oz)  Body mass index is 26.21 kg/m².    Intake/Output Summary (Last 24 hours) at 7/20/2024 1435  Last data filed at 7/20/2024 1257  Gross per 24 hour   Intake 2033.33 ml   Output 4500 ml   Net -2466.67 ml         Physical Exam  Vitals and nursing note reviewed.   HENT:      Head: Normocephalic and atraumatic.   Eyes:      Conjunctiva/sclera: Conjunctivae normal.   Neck:      Vascular: No JVD.   Cardiovascular:      Rate and Rhythm: Normal rate.      Heart sounds: Normal heart sounds.   Pulmonary:      Effort: Pulmonary effort is normal.      Breath sounds: Normal breath sounds.   Abdominal:      Palpations: Abdomen is soft.   Musculoskeletal:         General: Normal range of motion.   Skin:     General: Skin is warm.   Neurological:      Mental Status: She is alert and oriented to person, place, and time.   Psychiatric:         Mood and Affect: Mood normal.             Significant Labs: All pertinent labs within the past 24 hours have been reviewed.  BMP:   Recent Labs   Lab 07/20/24  0611   *      K 4.6      CO2 19*   BUN 31*   CREATININE 3.2*   CALCIUM 8.6*   MG 2.2     CBC:   Recent Labs   Lab 07/19/24  1115 07/20/24  0611   WBC 3.06* 8.43   HGB 6.7* 8.7*   HCT 20.2* 26.8*   PLT 64* 105*     CMP:   Recent Labs   Lab 07/18/24  1937 07/19/24  1116  07/20/24  0611    136 140   K 4.2 4.6 4.6    101 101   CO2 25 22* 19*   GLU 85 83 209*   BUN 29* 32* 31*   CREATININE 4.2* 4.4* 3.2*   CALCIUM 8.1* 8.0* 8.6*   PROT  --  5.5* 5.6*   ALBUMIN  --  3.4* 3.3*   BILITOT  --  3.8* 3.9*   ALKPHOS  --  135 132   AST  --  51* 36   ALT  --  79* 65*   ANIONGAP 11 13 20*       Significant Imaging: I have reviewed all pertinent imaging results/findings within the past 24 hours.

## 2024-07-21 PROBLEM — E11.10 DKA, TYPE 2: Status: ACTIVE | Noted: 2024-07-21

## 2024-07-21 LAB
ALBUMIN SERPL BCP-MCNC: 3.5 G/DL (ref 3.5–5.2)
ALP SERPL-CCNC: 124 U/L (ref 55–135)
ALT SERPL W/O P-5'-P-CCNC: 51 U/L (ref 10–44)
AMPHETAMINES SERPL QL SCN: NEGATIVE NG/ML
ANION GAP SERPL CALC-SCNC: 10 MMOL/L (ref 8–16)
ANION GAP SERPL CALC-SCNC: 13 MMOL/L (ref 8–16)
ANION GAP SERPL CALC-SCNC: 14 MMOL/L (ref 8–16)
ANION GAP SERPL CALC-SCNC: 17 MMOL/L (ref 8–16)
AST SERPL-CCNC: 22 U/L (ref 10–40)
B-OH-BUTYR BLD STRIP-SCNC: 4.1 MMOL/L (ref 0–0.5)
BACTERIA BLD CULT: NORMAL
BACTERIA BLD CULT: NORMAL
BARBITURATES SERPL QL SCN: NEGATIVE UG/ML
BASOPHILS # BLD AUTO: 0.05 K/UL (ref 0–0.2)
BASOPHILS NFR BLD: 0.5 % (ref 0–1.9)
BENZODIAZ SERPL QL SCN: NEGATIVE NG/ML
BENZODIAZ SERPL QL SCN: NEGATIVE NG/ML
BILIRUB SERPL-MCNC: 1.9 MG/DL (ref 0.1–1)
BUN SERPL-MCNC: 47 MG/DL (ref 6–20)
BUN SERPL-MCNC: 51 MG/DL (ref 6–20)
CALCIUM SERPL-MCNC: 8.3 MG/DL (ref 8.7–10.5)
CALCIUM SERPL-MCNC: 8.5 MG/DL (ref 8.7–10.5)
CALCIUM SERPL-MCNC: 8.6 MG/DL (ref 8.7–10.5)
CALCIUM SERPL-MCNC: 8.7 MG/DL (ref 8.7–10.5)
CANNABINOIDS SERPL QL SCN: NEGATIVE NG/ML
CHLORIDE SERPL-SCNC: 100 MMOL/L (ref 95–110)
CHLORIDE SERPL-SCNC: 103 MMOL/L (ref 95–110)
CHLORIDE SERPL-SCNC: 103 MMOL/L (ref 95–110)
CHLORIDE SERPL-SCNC: 104 MMOL/L (ref 95–110)
CO2 SERPL-SCNC: 22 MMOL/L (ref 23–29)
CO2 SERPL-SCNC: 24 MMOL/L (ref 23–29)
CO2 SERPL-SCNC: 25 MMOL/L (ref 23–29)
CO2 SERPL-SCNC: 27 MMOL/L (ref 23–29)
CREAT SERPL-MCNC: 4.9 MG/DL (ref 0.5–1.4)
CREAT SERPL-MCNC: 5.4 MG/DL (ref 0.5–1.4)
CREAT SERPL-MCNC: 5.6 MG/DL (ref 0.5–1.4)
CREAT SERPL-MCNC: 5.7 MG/DL (ref 0.5–1.4)
DIFFERENTIAL METHOD BLD: ABNORMAL
EOSINOPHIL # BLD AUTO: 0.1 K/UL (ref 0–0.5)
EOSINOPHIL NFR BLD: 1.3 % (ref 0–8)
ERYTHROCYTE [DISTWIDTH] IN BLOOD BY AUTOMATED COUNT: 20.1 % (ref 11.5–14.5)
EST. GFR  (NO RACE VARIABLE): 10 ML/MIN/1.73 M^2
EST. GFR  (NO RACE VARIABLE): 11.2 ML/MIN/1.73 M^2
EST. GFR  (NO RACE VARIABLE): 9.3 ML/MIN/1.73 M^2
EST. GFR  (NO RACE VARIABLE): 9.5 ML/MIN/1.73 M^2
GLUCOSE SERPL-MCNC: 100 MG/DL (ref 70–110)
GLUCOSE SERPL-MCNC: 116 MG/DL (ref 70–110)
GLUCOSE SERPL-MCNC: 121 MG/DL (ref 70–110)
GLUCOSE SERPL-MCNC: 138 MG/DL (ref 70–110)
GLUCOSE SERPL-MCNC: 142 MG/DL (ref 70–110)
GLUCOSE SERPL-MCNC: 146 MG/DL (ref 70–110)
GLUCOSE SERPL-MCNC: 163 MG/DL (ref 70–110)
GLUCOSE SERPL-MCNC: 193 MG/DL (ref 70–110)
GLUCOSE SERPL-MCNC: 210 MG/DL (ref 70–110)
GLUCOSE SERPL-MCNC: 211 MG/DL (ref 70–110)
GLUCOSE SERPL-MCNC: 215 MG/DL (ref 70–110)
GLUCOSE SERPL-MCNC: 317 MG/DL (ref 70–110)
GLUCOSE SERPL-MCNC: 330 MG/DL (ref 70–110)
GLUCOSE SERPL-MCNC: 69 MG/DL (ref 70–110)
GLUCOSE SERPL-MCNC: 71 MG/DL (ref 70–110)
GLUCOSE SERPL-MCNC: 92 MG/DL (ref 70–110)
GLUCOSE SERPL-MCNC: 96 MG/DL (ref 70–110)
GLUCOSE SERPL-MCNC: 99 MG/DL (ref 70–110)
GLUCOSE SERPL-MCNC: 99 MG/DL (ref 70–110)
HCG SERPL QL: NEGATIVE
HCT VFR BLD AUTO: 26.6 % (ref 37–48.5)
HGB BLD-MCNC: 8.6 G/DL (ref 12–16)
IMM GRANULOCYTES # BLD AUTO: 0.13 K/UL (ref 0–0.04)
IMM GRANULOCYTES NFR BLD AUTO: 1.3 % (ref 0–0.5)
LYMPHOCYTES # BLD AUTO: 2 K/UL (ref 1–4.8)
LYMPHOCYTES NFR BLD: 18.9 % (ref 18–48)
MAGNESIUM SERPL-MCNC: 2.3 MG/DL (ref 1.6–2.6)
MAGNESIUM SERPL-MCNC: 2.5 MG/DL (ref 1.6–2.6)
MCH RBC QN AUTO: 29.6 PG (ref 27–31)
MCHC RBC AUTO-ENTMCNC: 32.3 G/DL (ref 32–36)
MCV RBC AUTO: 91 FL (ref 82–98)
METHADONE SERPL QL SCN: NEGATIVE NG/ML
MONOCYTES # BLD AUTO: 1.5 K/UL (ref 0.3–1)
MONOCYTES NFR BLD: 14.7 % (ref 4–15)
NEUTROPHILS # BLD AUTO: 6.5 K/UL (ref 1.8–7.7)
NEUTROPHILS NFR BLD: 63.3 % (ref 38–73)
NRBC BLD-RTO: 2 /100 WBC
OPIATES SERPL QL SCN: NEGATIVE NG/ML
OXYCODONE+OXYMORPHONE SERPLBLD QL SCN: NEGATIVE NG/ML
PCP SERPL QL SCN: NEGATIVE NG/ML
PLATELET # BLD AUTO: 104 K/UL (ref 150–450)
PMV BLD AUTO: 9.8 FL (ref 9.2–12.9)
POTASSIUM SERPL-SCNC: 4.2 MMOL/L (ref 3.5–5.1)
POTASSIUM SERPL-SCNC: 4.3 MMOL/L (ref 3.5–5.1)
POTASSIUM SERPL-SCNC: 4.4 MMOL/L (ref 3.5–5.1)
POTASSIUM SERPL-SCNC: 5 MMOL/L (ref 3.5–5.1)
PROPOXYPH SERPL QL SCN: NEGATIVE NG/ML
PROT SERPL-MCNC: 5.7 G/DL (ref 6–8.4)
RBC # BLD AUTO: 2.91 M/UL (ref 4–5.4)
SODIUM SERPL-SCNC: 139 MMOL/L (ref 136–145)
SODIUM SERPL-SCNC: 140 MMOL/L (ref 136–145)
SODIUM SERPL-SCNC: 141 MMOL/L (ref 136–145)
SODIUM SERPL-SCNC: 142 MMOL/L (ref 136–145)
WBC # BLD AUTO: 10.31 K/UL (ref 3.9–12.7)

## 2024-07-21 PROCEDURE — 36415 COLL VENOUS BLD VENIPUNCTURE: CPT | Performed by: HOSPITALIST

## 2024-07-21 PROCEDURE — 20000000 HC ICU ROOM

## 2024-07-21 PROCEDURE — 63600175 PHARM REV CODE 636 W HCPCS: Performed by: INTERNAL MEDICINE

## 2024-07-21 PROCEDURE — S5010 5% DEXTROSE AND 0.45% SALINE: HCPCS | Performed by: INTERNAL MEDICINE

## 2024-07-21 PROCEDURE — 25000003 PHARM REV CODE 250: Performed by: HOSPITALIST

## 2024-07-21 PROCEDURE — 25000003 PHARM REV CODE 250: Performed by: INTERNAL MEDICINE

## 2024-07-21 PROCEDURE — 80053 COMPREHEN METABOLIC PANEL: CPT | Performed by: HOSPITALIST

## 2024-07-21 PROCEDURE — 80048 BASIC METABOLIC PNL TOTAL CA: CPT | Mod: 91 | Performed by: HOSPITALIST

## 2024-07-21 PROCEDURE — 82010 KETONE BODYS QUAN: CPT | Performed by: INTERNAL MEDICINE

## 2024-07-21 PROCEDURE — 80048 BASIC METABOLIC PNL TOTAL CA: CPT | Performed by: INTERNAL MEDICINE

## 2024-07-21 PROCEDURE — 27000221 HC OXYGEN, UP TO 24 HOURS

## 2024-07-21 PROCEDURE — 94761 N-INVAS EAR/PLS OXIMETRY MLT: CPT

## 2024-07-21 PROCEDURE — 36415 COLL VENOUS BLD VENIPUNCTURE: CPT | Performed by: INTERNAL MEDICINE

## 2024-07-21 PROCEDURE — 99900031 HC PATIENT EDUCATION (STAT)

## 2024-07-21 PROCEDURE — 84703 CHORIONIC GONADOTROPIN ASSAY: CPT | Performed by: INTERNAL MEDICINE

## 2024-07-21 PROCEDURE — 85025 COMPLETE CBC W/AUTO DIFF WBC: CPT | Performed by: HOSPITALIST

## 2024-07-21 PROCEDURE — 83735 ASSAY OF MAGNESIUM: CPT | Performed by: INTERNAL MEDICINE

## 2024-07-21 PROCEDURE — 63600175 PHARM REV CODE 636 W HCPCS: Performed by: HOSPITALIST

## 2024-07-21 PROCEDURE — 82962 GLUCOSE BLOOD TEST: CPT

## 2024-07-21 PROCEDURE — 83735 ASSAY OF MAGNESIUM: CPT | Mod: 91 | Performed by: HOSPITALIST

## 2024-07-21 RX ORDER — SODIUM CHLORIDE 0.9 % (FLUSH) 0.9 %
10 SYRINGE (ML) INJECTION
Status: DISCONTINUED | OUTPATIENT
Start: 2024-07-21 | End: 2024-07-23 | Stop reason: HOSPADM

## 2024-07-21 RX ORDER — IBUPROFEN 200 MG
24 TABLET ORAL
Status: DISCONTINUED | OUTPATIENT
Start: 2024-07-22 | End: 2024-07-23 | Stop reason: HOSPADM

## 2024-07-21 RX ORDER — GLUCAGON 1 MG
1 KIT INJECTION
Status: DISCONTINUED | OUTPATIENT
Start: 2024-07-22 | End: 2024-07-23 | Stop reason: HOSPADM

## 2024-07-21 RX ORDER — SODIUM CHLORIDE 9 MG/ML
INJECTION, SOLUTION INTRAVENOUS CONTINUOUS
Status: DISCONTINUED | OUTPATIENT
Start: 2024-07-21 | End: 2024-07-21

## 2024-07-21 RX ORDER — IBUPROFEN 200 MG
16 TABLET ORAL
Status: DISCONTINUED | OUTPATIENT
Start: 2024-07-22 | End: 2024-07-23 | Stop reason: HOSPADM

## 2024-07-21 RX ORDER — DEXTROSE MONOHYDRATE AND SODIUM CHLORIDE 5; .45 G/100ML; G/100ML
125 INJECTION, SOLUTION INTRAVENOUS CONTINUOUS PRN
Status: DISCONTINUED | OUTPATIENT
Start: 2024-07-21 | End: 2024-07-21

## 2024-07-21 RX ORDER — INSULIN GLARGINE 100 [IU]/ML
15 INJECTION, SOLUTION SUBCUTANEOUS NIGHTLY
Status: DISCONTINUED | OUTPATIENT
Start: 2024-07-21 | End: 2024-07-23 | Stop reason: HOSPADM

## 2024-07-21 RX ORDER — INSULIN ASPART 100 [IU]/ML
0-5 INJECTION, SOLUTION INTRAVENOUS; SUBCUTANEOUS
Status: DISCONTINUED | OUTPATIENT
Start: 2024-07-21 | End: 2024-07-23 | Stop reason: HOSPADM

## 2024-07-21 RX ORDER — SODIUM CHLORIDE 9 MG/ML
125 INJECTION, SOLUTION INTRAVENOUS CONTINUOUS
Status: DISCONTINUED | OUTPATIENT
Start: 2024-07-21 | End: 2024-07-21

## 2024-07-21 RX ORDER — ACETAMINOPHEN 325 MG/1
650 TABLET ORAL EVERY 4 HOURS PRN
Status: DISCONTINUED | OUTPATIENT
Start: 2024-07-21 | End: 2024-07-23 | Stop reason: HOSPADM

## 2024-07-21 RX ORDER — ONDANSETRON HYDROCHLORIDE 2 MG/ML
4 INJECTION, SOLUTION INTRAVENOUS EVERY 6 HOURS PRN
Status: DISCONTINUED | OUTPATIENT
Start: 2024-07-21 | End: 2024-07-23 | Stop reason: HOSPADM

## 2024-07-21 RX ADMIN — HYDRALAZINE HYDROCHLORIDE 25 MG: 25 TABLET ORAL at 05:07

## 2024-07-21 RX ADMIN — INSULIN GLARGINE 15 UNITS: 100 INJECTION, SOLUTION SUBCUTANEOUS at 03:07

## 2024-07-21 RX ADMIN — APIXABAN 5 MG: 5 TABLET, FILM COATED ORAL at 08:07

## 2024-07-21 RX ADMIN — DEXTROSE AND SODIUM CHLORIDE 125 ML/HR: 5; 450 INJECTION, SOLUTION INTRAVENOUS at 10:07

## 2024-07-21 RX ADMIN — LEVETIRACETAM 500 MG: 500 TABLET, FILM COATED ORAL at 09:07

## 2024-07-21 RX ADMIN — INSULIN HUMAN 0.1 UNITS/KG/HR: 1 INJECTION, SOLUTION INTRAVENOUS at 09:07

## 2024-07-21 RX ADMIN — MORPHINE SULFATE 2 MG: 2 INJECTION, SOLUTION INTRAMUSCULAR; INTRAVENOUS at 10:07

## 2024-07-21 RX ADMIN — LEVETIRACETAM 500 MG: 500 TABLET, FILM COATED ORAL at 08:07

## 2024-07-21 RX ADMIN — INSULIN ASPART 2 UNITS: 100 INJECTION, SOLUTION INTRAVENOUS; SUBCUTANEOUS at 12:07

## 2024-07-21 RX ADMIN — SEVELAMER CARBONATE 800 MG: 800 TABLET, FILM COATED ORAL at 04:07

## 2024-07-21 RX ADMIN — PANTOPRAZOLE SODIUM 40 MG: 40 TABLET, DELAYED RELEASE ORAL at 05:07

## 2024-07-21 RX ADMIN — HYDRALAZINE HYDROCHLORIDE 25 MG: 25 TABLET ORAL at 01:07

## 2024-07-21 RX ADMIN — GABAPENTIN 200 MG: 100 CAPSULE ORAL at 08:07

## 2024-07-21 RX ADMIN — MUPIROCIN 1 G: 20 OINTMENT TOPICAL at 10:07

## 2024-07-21 RX ADMIN — APIXABAN 5 MG: 5 TABLET, FILM COATED ORAL at 09:07

## 2024-07-21 RX ADMIN — SODIUM CHLORIDE: 9 INJECTION, SOLUTION INTRAVENOUS at 08:07

## 2024-07-21 NOTE — ASSESSMENT & PLAN NOTE
New problem.  Order insulin drip.  Check capillary blood glucose every hour.  Monitor potassium level.

## 2024-07-21 NOTE — CARE UPDATE
Patient with ketone positive in the serum. She was not able to eating down for some days . No AG and glucose range around 200. I did not upgraded the patient .

## 2024-07-21 NOTE — PROGRESS NOTES
INPATIENT NEPHROLOGY Progress Note  [facility]    Patient Name: Tabby Howard  Date: 07/21/2024    Reason for consultation: ESRD    Chief Complaint:   Chief Complaint   Patient presents with    Abdominal Pain     Patient was at dialysis and complained of lower back pain, did not finish  only took 3 kg at dialysis.  Has missed about 8 appointments in the last 6 weeks of dialysis. CBG was 58, D10 given.  SpO2 did drop from 97 to 79% on room.  Patient is on 3 liters.         History of Present Illness:  This is a 34-year-old female with end-stage kidney disease and on dialysis presented to the hospital after not feeling well during dialysis. In the middle of dialysis the patient had abdominal discomfort and complained of diarrhea. Dialysis was stopped and the patient was sent to the hospital. She got 2L off. She was 12L above dry weight. Workup included CT of the abdomen and pelvis which resulted in diffuse body wall edema, mild circumferential wall thickening of the distal descending colon, sigmoid colon and rectum. There was also circumferential bladder wall thickening. This process could correlate with infectious/inflammatory process. Patient was given antibiotics empirically in the ER. Patient has chronic pain and complaints of abdominal pain. No major fever or chills. Consulted for dialysis.    Interval History:  7/17- IUF today, HD/UF MWF  7/18  had IUF only yesterday, K+ a little elevated, will run regular tx today.  Pt starting to feel better w/fluid off.  Hgb dropping, ordered epo w/HD on MWF.  7/19  Patient seen on hemodialysis for uremic clearance and ultrafiltration.    7/20 low BP this AM- holding all BP meds- repeating echo- spoke with Dr. Tompkins and pericardial effusion small on last echo- Dr. Smith will see patient today  7/21 labs showed DKA yest- transferred to ICU for insulin gtt- BP all over the place, on 2L NC, repeat echo shows moderate circumferential effusion-  echolucent-   no indication of cardiac tamponade, volume status improved    Plan of Care:    Assessment:  ESRD on HD MWF  Pericardial effusion- uremia, volume overload; HTN/D CHF  DKA  SHPT  Anemia of CKD  Thrombocytopenia    Plan:    - routine HD MWF  - echo reviewed- continue UF with HD- renal diet, 1.5L fluid restriction- continue hydralazine- eventually resume coreg and norvasc  - on DKA pathway- ok with gentle IVFs if NPO  - continue binder with meals  - transfused this admission- continue SHELLEY with HD  - hold heparin with HD    Thank you for allowing us to participate in this patient's care. We will continue to follow.    Vital Signs:  Temp Readings from Last 3 Encounters:   07/21/24 98.5 °F (36.9 °C) (Axillary)   06/23/24 98.2 °F (36.8 °C) (Oral)   05/13/24 98.3 °F (36.8 °C) (Oral)       Pulse Readings from Last 3 Encounters:   07/21/24 76   06/23/24 90   05/13/24 75       BP Readings from Last 3 Encounters:   07/21/24 (!) 140/67   06/23/24 (!) 169/85   05/13/24 (!) 149/79       Weight:  Wt Readings from Last 3 Encounters:   07/16/24 65 kg (143 lb 4.8 oz)   07/20/24 65.9 kg (145 lb 4.5 oz)   07/17/24 64.9 kg (143 lb)       Medications:  Scheduled Meds:   apixaban  5 mg Oral BID    epoetin teresa-epbx  10,000 Units Intravenous Every Mon, Wed, Fri    FLUoxetine  20 mg Oral Daily    fluticasone propionate  1 spray Each Nostril Daily    gabapentin  200 mg Oral BID    hydrALAZINE  25 mg Oral Q8H    insulin regular  0.1 Units/kg Intravenous Once    labetalol  20 mg Intravenous Once    levETIRAcetam  500 mg Oral BID    mupirocin   Nasal BID    pantoprazole  40 mg Oral Before breakfast    promethazine (PHENERGAN) 12.5 mg in 0.9% NaCl 50 mL IVPB  12.5 mg Intravenous Once    sevelamer carbonate  800 mg Oral TID WM     Continuous Infusions:   0.9% NaCl   Intravenous Continuous   Paused at 07/21/24 1026    D5 and 0.45% NaCl  125 mL/hr Intravenous Continuous  mL/hr at 07/21/24 1028 125 mL/hr at 07/21/24 1028    insulin regular 1  units/mL infusion orderable (DKA)  0-0.2 Units/kg/hr Intravenous Continuous 3.3 mL/hr at 07/21/24 1028 0.05 Units/kg/hr at 07/21/24 1028     PRN Meds:.  Current Facility-Administered Medications:     0.9%  NaCl infusion (for blood administration), , Intravenous, Q24H PRN    0.9%  NaCl infusion (for blood administration), , Intravenous, Q24H PRN    0.9%  NaCl infusion (for blood administration), , Intravenous, Q24H PRN    acetaminophen, 650 mg, Oral, Q8H PRN    acetaminophen, 650 mg, Oral, Q4H PRN    acetaminophen, 650 mg, Oral, Q4H PRN    albuterol sulfate, 2.5 mg, Nebulization, Q4H PRN    aluminum-magnesium hydroxide-simethicone, 30 mL, Oral, QID PRN    D5 and 0.45% NaCl, 125 mL/hr, Intravenous, Continuous PRN    dextrose 50%, 12.5 g, Intravenous, PRN    dextrose 50%, 12.5 g, Intravenous, PRN    dextrose 50%, 25 g, Intravenous, PRN    dextrose 50%, 25 g, Intravenous, PRN    diphenhydrAMINE, 25 mg, Oral, Q6H PRN    glucagon (human recombinant), 1 mg, Intramuscular, PRN    glucose, 16 g, Oral, PRN    glucose, 24 g, Oral, PRN    HYDROcodone-acetaminophen, 1 tablet, Oral, Q4H PRN    insulin aspart U-100, 0-5 Units, Subcutaneous, QID (AC + HS) PRN    magnesium oxide, 800 mg, Oral, PRN    magnesium oxide, 800 mg, Oral, PRN    melatonin, 6 mg, Oral, Nightly PRN    morphine, 2 mg, Intravenous, Q4H PRN    naloxone, 0.02 mg, Intravenous, PRN    nitroGLYCERIN, 0.4 mg, Sublingual, Q5 Min PRN    ondansetron, 4 mg, Intravenous, Q6H PRN    ondansetron, 4 mg, Intravenous, Q6H PRN    potassium bicarbonate, 35 mEq, Oral, PRN    potassium bicarbonate, 50 mEq, Oral, PRN    potassium bicarbonate, 60 mEq, Oral, PRN    potassium, sodium phosphates, 2 packet, Oral, PRN    potassium, sodium phosphates, 2 packet, Oral, PRN    potassium, sodium phosphates, 2 packet, Oral, PRN    sodium chloride 0.9%, 10 mL, Intravenous, Q12H PRN    sodium chloride 0.9%, 10 mL, Intravenous, PRN  No current facility-administered medications on file prior to  encounter.     Current Outpatient Medications on File Prior to Encounter   Medication Sig Dispense Refill    acetaZOLAMIDE (DIAMOX) 250 MG tablet take 1 tablet by mouth 3 times a day (Patient taking differently: Take 250 mg by mouth 3 (three) times daily.) 90 tablet 3    albuterol (VENTOLIN HFA) 90 mcg/actuation inhaler Inhale 2 puffs every 4 hours by inhalation route. (Patient taking differently: Inhale 2 puffs into the lungs every 4 (four) hours as needed for Shortness of Breath or Wheezing.) 8 g 2    amLODIPine (NORVASC) 5 MG tablet Take 1 tablet (5 mg total) by mouth once daily. 30 tablet 11    apixaban (ELIQUIS) 5 mg Tab Take 1 tablet (5 mg total) by mouth 2 (two) times daily. Patient w/ thrombocytopenia <50, so held during admission, follow-up with hematologist about restarting 180 tablet 1    atropine 1% (ISOPTO ATROPINE) 1 % Drop Place 1 drop into the left eye 2 (two) times a day. 15 mL 3    blood sugar diagnostic (TRUE METRIX GLUCOSE TEST STRIP) Strp Use 1 strip to check blood glucose three times daily 100 each 11    blood-glucose meter (TRUE METRIX GLUCOSE METER) Misc Use to check blood glucose 1 each 0    blood-glucose meter,continuous (DEXCOM ) Misc USE WITH SENSORS 1 each 0    blood-glucose sensor Heather CHANGE EVERY 10 DAYS 3 each 0    brimonidine 0.15 % OPTH DROP (ALPHAGAN) 0.15 % ophthalmic solution Place 1 drop into both eyes 3 (three) times daily. 15 mL 3    brinzolamide (AZOPT) 1 % ophthalmic suspension Place1 drop in left eye 3 times a day for 30 days (Patient taking differently: Place 1 drop into both eyes 3 (three) times daily.) 15 mL 6    busPIRone (BUSPAR) 10 MG tablet Take 1 tablet (10 mg total) by mouth 3 (three) times daily as needed (anxiety). 60 tablet 5    carvediloL (COREG) 25 MG tablet take 1 tablet Oral 2 times daily (Patient taking differently: Take 25 mg by mouth 2 (two) times daily.) 60 tablet 0    cetirizine (ZYRTEC) 10 MG tablet Take 1 tablet every day by oral route.  (Patient taking differently: Take 10 mg by mouth once daily.) 30 tablet 0    dorzolamide-timolol 2-0.5% (COSOPT) 22.3-6.8 mg/mL ophthalmic solution place 1 drop in left eye twice a day  for 30 days (Patient taking differently: Place 1 drop into both eyes 2 (two) times daily.) 10 mL 0    FLUoxetine 20 MG capsule Take 1 capsule every day by oral route for 90 days. (Patient taking differently: Take 20 mg by mouth once daily.) 90 capsule 1    FLUoxetine 40 MG capsule Take 1 capsule every day by oral route. (Patient taking differently: Take 40 mg by mouth once daily.) 30 capsule 2    fluticasone propionate (FLONASE ALLERGY RELIEF) 50 mcg/actuation nasal spray [place] 1 spray every day by intranasal route. (Patient taking differently: 1 spray by Each Nostril route once daily.) 16 g 2    gabapentin (NEURONTIN) 300 MG capsule Take 2 capsules twice a day by oral route for 90 days. (Patient taking differently: Take 600 mg by mouth 2 (two) times daily.) 360 capsule 1    hydrALAZINE (APRESOLINE) 25 MG tablet take 1 tablet by mouth every 8 hours (Patient taking differently: Take 25 mg by mouth every 8 (eight) hours.) 90 tablet 0    insulin detemir U-100, Levemir, (LEVEMIR FLEXTOUCH U100 INSULIN) 100 unit/mL (3 mL) InPn pen Inject 22 Units into the skin every evening. Patient not needing insulin in-patient. Follow-up with PCP for hospital follow-up and restarting the medication. Or restart medication if glucose >200 consistently resume home insulin regimen. Or if glucose is persistently less than 100, decrease by 5 units until following up with PCP 15 mL 1    insulin glargine U-100, Lantus, 100 unit/mL injection inject 13 unit subcutaneously 2 times daily (Patient taking differently: Inject 13 Units into the skin 2 (two) times a day.) 10 mL 0    isosorbide mononitrate (IMDUR) 30 MG 24 hr tablet Take 1 tablet (30 mg total) by mouth once daily. 90 tablet 1    lancets (TRUEPLUS LANCETS) 33 gauge Misc Use 1 to check blood glucose three  "times daily 100 each 11    lancets 33 gauge Misc Use to test twice daily 100 each 5    levETIRAcetam (KEPPRA) 500 MG Tab Take 1 tablet (500 mg total) by mouth 2 (two) times daily. 60 tablet 11    LORazepam (ATIVAN) 0.5 MG tablet Take 1 tablet 3 times a day by oral route for 7 days, for severe panic. (Patient taking differently: Take 0.5 mg by mouth 3 (three) times daily.) 21 tablet 2    ondansetron (ZOFRAN-ODT) 4 MG TbDL Place 1 tablet (4 mg) on or under the tongue every 8 hours for 10 days. (Patient taking differently: Take 8 mg by mouth every 8 (eight) hours as needed.) 24 tablet 2    oxyCODONE-acetaminophen (PERCOCET)  mg per tablet Take 1 tablet by mouth every 4 (four) hours as needed for Pain. 42 tablet 0    pantoprazole (PROTONIX) 40 MG tablet Take 1 tablet (40 mg total) by mouth once daily. 30 tablet 0    pen needle, diabetic 31 gauge x 3/16" Ndle Use as directed with insulin once daily 100 each 0    prednisoLONE acetate (PRED FORTE) 1 % DrpS Place 1 drop into the left eye 2 (two) times daily. 5 mL 3    predniSONE (DELTASONE) 20 MG tablet take 3 tablets Oral Daily,x30 day(s) (Patient taking differently: Take 20 mg by mouth once daily.) 90 tablet 0    sevelamer carbonate (RENVELA) 800 mg Tab Take 1 tablet (800 mg total) by mouth 3 (three) times daily with meals. 180 tablet 11    sucralfate (CARAFATE) 100 mg/mL suspension Take 10 mL 4 times a day by oral route before meals for 30 days. (Patient taking differently: Take 10 mLs by mouth 4 (four) times daily with meals and nightly.) 1200 mL 1    timolol maleate 0.5% (TIMOPTIC) 0.5 % Drop Place 1 drop in left eye 2 times a day for 30 days (Patient taking differently: Place 1 drop into both eyes 2 (two) times daily.) 5 mL 6    tobramycin-dexAMETHasone 0.3-0.1% (TOBRADEX) 0.3-0.1 % DrpS 1 drop Eye-Left every 6 hours for 5 day(s) 5 mL 0    [DISCONTINUED] atenoloL (TENORMIN) 50 MG tablet Take 1 tablet (50 mg total) by mouth every other day. 45 tablet 3    " [DISCONTINUED] omeprazole (PRILOSEC) 20 MG capsule Take 2 capsules (40 mg total) by mouth once daily. for 10 days 20 capsule 0       Review of Systems:  neg    Physical Exam:  Constitutional: nad, aao x 3  Heart: rrr, no m/r/g, wwp, + edema  Lungs: ctab, no w/r/r/c, no lb  Abdomen: s/nt/d, +BS      Results:  Lab Results   Component Value Date     07/21/2024    K 5.0 07/21/2024     07/21/2024    CO2 24 07/21/2024    BUN 51 (H) 07/21/2024    CREATININE 5.4 (H) 07/21/2024    CALCIUM 8.5 (L) 07/21/2024    ANIONGAP 14 07/21/2024    ESTGFRAFRICA 19 (L) 09/03/2022    EGFRNONAA 18 (A) 07/31/2022       Lab Results   Component Value Date    CALCIUM 8.5 (L) 07/21/2024    PHOS 7.5 (H) 06/22/2024       Recent Labs   Lab 07/21/24  0649   WBC 10.31   RBC 2.91*   HGB 8.6*   HCT 26.6*   *   MCV 91   MCH 29.6   MCHC 32.3     Patient care time was spent personally by me on the following activities: > 35 minutes  Obtaining a history.  Examination of patient.  Providing medical care at the patients bedside.  Developing a treatment plan with patient or surrogate and bedside caregivers.  Ordering and reviewing laboratory studies, radiographic studies, pulse oximetry.  Ordering and performing treatments and interventions.  Evaluation of patient's response to treatment.  Discussions with consultants while on the unit and immediately available to the patient.  Re-evaluation of the patient's condition.  Documentation in the medical record.      Prateek Clark MD    [facility]  [address] [place] [id number]  [phone] (p)  [phone] (f)

## 2024-07-21 NOTE — SUBJECTIVE & OBJECTIVE
Interval History:  She was moved from [facility] to ICU late last night because of DKA.  She is on insulin drip and other standard management for DKA.    Review of Systems   Constitutional:  Negative for fever.   Gastrointestinal:  Positive for abdominal pain.     Objective:     Vital Signs (Most Recent):  Temp: 97.5 °F (36.4 °C) (07/21/24 1145)  Pulse: 73 (07/21/24 1145)  Resp: 11 (07/21/24 1145)  BP: (!) 149/73 (07/21/24 1145)  SpO2: 100 % (07/21/24 1145) Vital Signs (24h Range):  Temp:  [97.1 °F (36.2 °C)-98.5 °F (36.9 °C)] 97.5 °F (36.4 °C)  Pulse:  [73-88] 73  Resp:  [9-45] 11  SpO2:  [79 %-100 %] 100 %  BP: ()/() 149/73     Weight: 65 kg (143 lb 4.8 oz)  Body mass index is 26.21 kg/m².    Intake/Output Summary (Last 24 hours) at 7/21/2024 1223  Last data filed at 7/20/2024 1257  Gross per 24 hour   Intake 25 ml   Output --   Net 25 ml         Physical Exam  Constitutional:       General: She is not in acute distress.     Appearance: She is ill-appearing.   Eyes:      General:         Right eye: No discharge.         Left eye: No discharge.   Neck:      Vascular: No JVD.   Cardiovascular:      Rate and Rhythm: Normal rate and regular rhythm.   Pulmonary:      Effort: Pulmonary effort is normal.      Breath sounds: Normal breath sounds.   Abdominal:      General: Abdomen is flat. Bowel sounds are normal. There is no distension.      Palpations: Abdomen is soft.      Tenderness: There is no abdominal tenderness.   Musculoskeletal:      Right lower leg: Edema present.      Left lower leg: Edema present.   Skin:     General: Skin is warm and moist.      Findings: No rash.   Neurological:      Mental Status: She is alert and oriented to person, place, and time.   Psychiatric:         Attention and Perception: Attention normal.         Mood and Affect: Affect is flat.         Speech: Speech normal.             Significant Labs: All pertinent labs within the past 24 hours have been reviewed.    Significant  Imaging:  No new imaging

## 2024-07-21 NOTE — ASSESSMENT & PLAN NOTE
CT of the chest showed moderate to severe pericardial effusion.  Echo revealed small effusion   No evidence of tamponade.    Continue dialysis for fluid removal.

## 2024-07-21 NOTE — PLAN OF CARE
Problem: Adult Inpatient Plan of Care  Goal: Patient-Specific Goal (Individualized)  Outcome: Progressing  Goal: Optimal Comfort and Wellbeing  Outcome: Progressing     Problem: Diabetic Ketoacidosis  Goal: Optimal Coping  Outcome: Progressing

## 2024-07-21 NOTE — ASSESSMENT & PLAN NOTE
Abdominal exam is nonsurgical abdomen and CT scan with circumferential wall thickening of distal descending colon, sigmoid colon, and rectum.  The group B inflammatory process infectious process versus dialysis patient can have fluid retention in this areas  Agree with Currently with holding further antibiotics  Patient is tolerating the diet without any problem

## 2024-07-21 NOTE — ASSESSMENT & PLAN NOTE
Patient's anemia is currently stable.  Received 1 unit of blood two days ago.. Etiology likely d/t chronic disease due to Chronic Kidney Disease  Current CBC reviewed-   Lab Results   Component Value Date    HGB 8.6 (L) 07/21/2024    HCT 26.6 (L) 07/21/2024     Monitor serial CBC and transfuse if patient becomes hemodynamically unstable, symptomatic or H/H drops below 7/21.

## 2024-07-21 NOTE — ASSESSMENT & PLAN NOTE
Outpatient antihypertensives include:    Amlodipine 5 mg daily   Carvedilol 25 mg p.o. b.i.d.   Hydralazine 25 mg t.i.d.  isosorbide mononitrate 30 mg daily    We have restarted her hydralazine.  Her blood pressure is very labile.  We will keep monitoring closely.

## 2024-07-21 NOTE — PROGRESS NOTES
[facility]  Progress Note    Patient Name: Tabby Howard  MRN: [id number]  Patient Class: IP- Inpatient   Admission Date: 7/16/2024  Length of Stay: 3 days  Attending Physician: Jason Peres MD  Primary Care Provider: Melony Kim NP        Subjective:     Principal Problem:DKA, type 2        HPI:  This is a 34-year-old female with end-stage kidney disease and on dialysis presented to the hospital after not feeling well during dialysis.  In the middle of dialysis the patient had abdominal discomfort and complained of diarrhea.  Dialysis was stopped and the patient was sent to the hospital.  Workup included CT of the abdomen and pelvis which resulted in diffuse body wall edema, mild circumferential wall thickening of the distal descending colon, sigmoid colon and rectum.  There was also circumferential bladder wall thickening.  This process could correlate with infectious/inflammatory process.  Patient was given antibiotics empirically in the ER.  Patient has chronic pain and complaints of abdominal pain.  No major fever or chills.  Nephrology was informed and they would like the patient to be admitted so that she complete her dialysis tomorrow.    Overview/Hospital Course:  The patient was admitted and placed on prn pain meds. Her presentation was similar to past presentations of chronic abdominal pain. It was thought the findings on abdominal and pelvic CT were most consistent with fluid overload due to missed dialysis sessions. Nephrology  was consulted and dialysis orders were placed. Echo was ordered to evaluate pericardial effusion seen on CT, and revealed small effusion with tamponade. Cardiology consulted. Recommended continue dialysis to remove fluid, and felt this would be adequate to remove the pericardial effusion. Recommended repeating echo prior to discharge.     Interval History:  She was moved from med tele to ICU late last night because of DKA.  She is on  insulin drip and other standard management for DKA.    Review of Systems   Constitutional:  Negative for fever.   Gastrointestinal:  Positive for abdominal pain.     Objective:     Vital Signs (Most Recent):  Temp: 97.5 °F (36.4 °C) (07/21/24 1145)  Pulse: 73 (07/21/24 1145)  Resp: 11 (07/21/24 1145)  BP: (!) 149/73 (07/21/24 1145)  SpO2: 100 % (07/21/24 1145) Vital Signs (24h Range):  Temp:  [97.1 °F (36.2 °C)-98.5 °F (36.9 °C)] 97.5 °F (36.4 °C)  Pulse:  [73-88] 73  Resp:  [9-45] 11  SpO2:  [79 %-100 %] 100 %  BP: ()/() 149/73     Weight: 65 kg (143 lb 4.8 oz)  Body mass index is 26.21 kg/m².    Intake/Output Summary (Last 24 hours) at 7/21/2024 1223  Last data filed at 7/20/2024 1257  Gross per 24 hour   Intake 25 ml   Output --   Net 25 ml         Physical Exam  Constitutional:       General: She is not in acute distress.     Appearance: She is ill-appearing.   Eyes:      General:         Right eye: No discharge.         Left eye: No discharge.   Neck:      Vascular: No JVD.   Cardiovascular:      Rate and Rhythm: Normal rate and regular rhythm.   Pulmonary:      Effort: Pulmonary effort is normal.      Breath sounds: Normal breath sounds.   Abdominal:      General: Abdomen is flat. Bowel sounds are normal. There is no distension.      Palpations: Abdomen is soft.      Tenderness: There is no abdominal tenderness.   Musculoskeletal:      Right lower leg: Edema present.      Left lower leg: Edema present.   Skin:     General: Skin is warm and moist.      Findings: No rash.   Neurological:      Mental Status: She is alert and oriented to person, place, and time.   Psychiatric:         Attention and Perception: Attention normal.         Mood and Affect: Affect is flat.         Speech: Speech normal.             Significant Labs: All pertinent labs within the past 24 hours have been reviewed.    Significant Imaging:  No new imaging    Assessment/Plan:      * DKA, type 2  New problem.  Order insulin drip.   Check capillary blood glucose every hour.  Monitor potassium level.    Seizures  Continue Keppra.  No reports of seizure activity.    MDD (major depressive disorder)  Fluoxetine 20 mg daily    Hypertension  Outpatient antihypertensives include:    Amlodipine 5 mg daily   Carvedilol 25 mg p.o. b.i.d.   Hydralazine 25 mg t.i.d.  isosorbide mononitrate 30 mg daily    We have restarted her hydralazine.  Her blood pressure is very labile.  We will keep monitoring closely.    Anemia in ESRD (end-stage renal disease)  Patient's anemia is currently stable.  Received 1 unit of blood two days ago.. Etiology likely d/t chronic disease due to Chronic Kidney Disease  Current CBC reviewed-   Lab Results   Component Value Date    HGB 8.6 (L) 07/21/2024    HCT 26.6 (L) 07/21/2024     Monitor serial CBC and transfuse if patient becomes hemodynamically unstable, symptomatic or H/H drops below 7/21.    Type 2 diabetes mellitus with hyperglycemia, with long-term current use of insulin, previous HbA1c 5.8%  Patient currently has DKA.  She is on insulin drip and capillary blood glucose every hour.      Thrombocytopenia  Patient has chronic thrombocytopenia.  We will monitor platelet count daily.  Hold DVT prophylaxis if platelets are <50k. The patient's platelet results have been reviewed and are listed below.  Recent Labs   Lab 07/21/24  0649   *         Deep vein thrombosis (DVT) of non-extremity vein  Continue apixaban      ESRD (end stage renal disease)  Nephrology  on the case.  Patient routine hemodialysis.    Pericardial effusion  CT of the chest showed moderate to severe pericardial effusion.  Echo revealed small effusion   No evidence of tamponade.    Continue dialysis for fluid removal.      VTE Risk Mitigation (From admission, onward)           Ordered     apixaban tablet 5 mg  2 times daily         07/17/24 7267     Reason for No Pharmacological VTE Prophylaxis  Once        Question:  Reasons:  Answer:  Already  adequately anticoagulated on oral Anticoagulants    07/16/24 2030     IP VTE HIGH RISK PATIENT  Once         07/16/24 2030     Place sequential compression device  Until discontinued         07/16/24 2030                    Discharge Planning   TATIANA: 7/22/2024     Code Status: Full Code   Is the patient medically ready for discharge?:     Reason for patient still in hospital (select all that apply): Patient trending condition, Laboratory test, and Treatment  Discharge Plan A: Home with family            Jason Peres MD  Department of Hospital Medicine   Critical access hospital

## 2024-07-21 NOTE — ASSESSMENT & PLAN NOTE
Patient has chronic thrombocytopenia.  We will monitor platelet count daily.  Hold DVT prophylaxis if platelets are <50k. The patient's platelet results have been reviewed and are listed below.  Recent Labs   Lab 07/21/24  0649   *

## 2024-07-22 ENCOUNTER — CLINICAL SUPPORT (OUTPATIENT)
Dept: CARDIOLOGY | Facility: HOSPITAL | Age: 34
DRG: 637 | End: 2024-07-22
Attending: HOSPITALIST
Payer: MEDICAID

## 2024-07-22 VITALS — BODY MASS INDEX: 26.74 KG/M2 | WEIGHT: 145.31 LBS | HEIGHT: 62 IN

## 2024-07-22 LAB
ALBUMIN SERPL BCP-MCNC: 3.2 G/DL (ref 3.5–5.2)
ALP SERPL-CCNC: 106 U/L (ref 55–135)
ALT SERPL W/O P-5'-P-CCNC: 37 U/L (ref 10–44)
ANION GAP SERPL CALC-SCNC: 13 MMOL/L (ref 8–16)
APICAL FOUR CHAMBER EJECTION FRACTION: 53 %
AST SERPL-CCNC: 16 U/L (ref 10–40)
BASOPHILS # BLD AUTO: 0.03 K/UL (ref 0–0.2)
BASOPHILS NFR BLD: 0.3 % (ref 0–1.9)
BILIRUB SERPL-MCNC: 1.4 MG/DL (ref 0.1–1)
BSA FOR ECHO PROCEDURE: 1.7 M2
BUN SERPL-MCNC: 57 MG/DL (ref 6–20)
CALCIUM SERPL-MCNC: 8 MG/DL (ref 8.7–10.5)
CHLORIDE SERPL-SCNC: 102 MMOL/L (ref 95–110)
CO2 SERPL-SCNC: 23 MMOL/L (ref 23–29)
CREAT SERPL-MCNC: 6.8 MG/DL (ref 0.5–1.4)
CV ECHO LV RWT: 0.58 CM
DIFFERENTIAL METHOD BLD: ABNORMAL
ECHO LV POSTERIOR WALL: 1.4 CM (ref 0.6–1.1)
EOSINOPHIL # BLD AUTO: 0.3 K/UL (ref 0–0.5)
EOSINOPHIL NFR BLD: 3 % (ref 0–8)
ERYTHROCYTE [DISTWIDTH] IN BLOOD BY AUTOMATED COUNT: 20.9 % (ref 11.5–14.5)
EST. GFR  (NO RACE VARIABLE): 7.6 ML/MIN/1.73 M^2
FRACTIONAL SHORTENING: 38 % (ref 28–44)
GLUCOSE SERPL-MCNC: 133 MG/DL (ref 70–110)
GLUCOSE SERPL-MCNC: 134 MG/DL (ref 70–110)
GLUCOSE SERPL-MCNC: 168 MG/DL (ref 70–110)
GLUCOSE SERPL-MCNC: 193 MG/DL (ref 70–110)
GLUCOSE SERPL-MCNC: 355 MG/DL (ref 70–110)
GLUCOSE SERPL-MCNC: 96 MG/DL (ref 70–110)
HCT VFR BLD AUTO: 24 % (ref 37–48.5)
HGB BLD-MCNC: 7.7 G/DL (ref 12–16)
IMM GRANULOCYTES # BLD AUTO: 0.16 K/UL (ref 0–0.04)
IMM GRANULOCYTES NFR BLD AUTO: 1.8 % (ref 0–0.5)
INTERVENTRICULAR SEPTUM: 1.4 CM (ref 0.6–1.1)
LEFT ATRIUM SIZE: 4 CM
LEFT INTERNAL DIMENSION IN SYSTOLE: 3 CM (ref 2.1–4)
LEFT VENTRICLE DIASTOLIC VOLUME INDEX: 64.38 ML/M2
LEFT VENTRICLE DIASTOLIC VOLUME: 107.52 ML
LEFT VENTRICLE END DIASTOLIC VOLUME APICAL 4 CHAMBER: 90.7 ML
LEFT VENTRICLE MASS INDEX: 164 G/M2
LEFT VENTRICLE SYSTOLIC VOLUME INDEX: 21 ML/M2
LEFT VENTRICLE SYSTOLIC VOLUME: 35 ML
LEFT VENTRICULAR INTERNAL DIMENSION IN DIASTOLE: 4.8 CM (ref 3.5–6)
LEFT VENTRICULAR MASS: 273.82 G
LVED V (TEICH): 107.52 ML
LVES V (TEICH): 35 ML
LYMPHOCYTES # BLD AUTO: 1.7 K/UL (ref 1–4.8)
LYMPHOCYTES NFR BLD: 18.7 % (ref 18–48)
MAGNESIUM SERPL-MCNC: 2.4 MG/DL (ref 1.6–2.6)
MCH RBC QN AUTO: 29.7 PG (ref 27–31)
MCHC RBC AUTO-ENTMCNC: 32.1 G/DL (ref 32–36)
MCV RBC AUTO: 93 FL (ref 82–98)
MONOCYTES # BLD AUTO: 1.2 K/UL (ref 0.3–1)
MONOCYTES NFR BLD: 12.8 % (ref 4–15)
NEUTROPHILS # BLD AUTO: 5.7 K/UL (ref 1.8–7.7)
NEUTROPHILS NFR BLD: 63.4 % (ref 38–73)
NRBC BLD-RTO: 4 /100 WBC
OHS CV RV/LV RATIO: 0.56 CM
PLATELET # BLD AUTO: 105 K/UL (ref 150–450)
PMV BLD AUTO: 9.6 FL (ref 9.2–12.9)
POTASSIUM SERPL-SCNC: 4.6 MMOL/L (ref 3.5–5.1)
PROT SERPL-MCNC: 5.5 G/DL (ref 6–8.4)
RBC # BLD AUTO: 2.59 M/UL (ref 4–5.4)
RIGHT VENTRICULAR END-DIASTOLIC DIMENSION: 2.7 CM
SODIUM SERPL-SCNC: 138 MMOL/L (ref 136–145)
WBC # BLD AUTO: 9.04 K/UL (ref 3.9–12.7)
Z-SCORE OF LEFT VENTRICULAR DIMENSION IN END DIASTOLE: 0.28
Z-SCORE OF LEFT VENTRICULAR DIMENSION IN END SYSTOLE: 0.29

## 2024-07-22 PROCEDURE — 82962 GLUCOSE BLOOD TEST: CPT

## 2024-07-22 PROCEDURE — 99900035 HC TECH TIME PER 15 MIN (STAT)

## 2024-07-22 PROCEDURE — 25000003 PHARM REV CODE 250: Performed by: INTERNAL MEDICINE

## 2024-07-22 PROCEDURE — 27000221 HC OXYGEN, UP TO 24 HOURS

## 2024-07-22 PROCEDURE — 63600175 PHARM REV CODE 636 W HCPCS: Performed by: HOSPITALIST

## 2024-07-22 PROCEDURE — 93308 TTE F-UP OR LMTD: CPT

## 2024-07-22 PROCEDURE — 99900031 HC PATIENT EDUCATION (STAT)

## 2024-07-22 PROCEDURE — 85025 COMPLETE CBC W/AUTO DIFF WBC: CPT | Performed by: HOSPITALIST

## 2024-07-22 PROCEDURE — 63600175 PHARM REV CODE 636 W HCPCS: Performed by: INTERNAL MEDICINE

## 2024-07-22 PROCEDURE — 80053 COMPREHEN METABOLIC PANEL: CPT | Performed by: HOSPITALIST

## 2024-07-22 PROCEDURE — 94761 N-INVAS EAR/PLS OXIMETRY MLT: CPT

## 2024-07-22 PROCEDURE — 93321 DOPPLER ECHO F-UP/LMTD STD: CPT | Mod: 26,,, | Performed by: INTERNAL MEDICINE

## 2024-07-22 PROCEDURE — 25000003 PHARM REV CODE 250: Performed by: HOSPITALIST

## 2024-07-22 PROCEDURE — 90935 HEMODIALYSIS ONE EVALUATION: CPT

## 2024-07-22 PROCEDURE — 63600175 PHARM REV CODE 636 W HCPCS: Mod: JZ,JG | Performed by: NURSE PRACTITIONER

## 2024-07-22 PROCEDURE — 12000002 HC ACUTE/MED SURGE SEMI-PRIVATE ROOM

## 2024-07-22 PROCEDURE — 93308 TTE F-UP OR LMTD: CPT | Mod: 26,,, | Performed by: INTERNAL MEDICINE

## 2024-07-22 PROCEDURE — 83735 ASSAY OF MAGNESIUM: CPT | Performed by: HOSPITALIST

## 2024-07-22 PROCEDURE — 36415 COLL VENOUS BLD VENIPUNCTURE: CPT | Performed by: HOSPITALIST

## 2024-07-22 RX ADMIN — APIXABAN 5 MG: 5 TABLET, FILM COATED ORAL at 08:07

## 2024-07-22 RX ADMIN — INSULIN ASPART 5 UNITS: 100 INJECTION, SOLUTION INTRAVENOUS; SUBCUTANEOUS at 06:07

## 2024-07-22 RX ADMIN — EPOETIN ALFA-EPBX 10000 UNITS: 10000 INJECTION, SOLUTION INTRAVENOUS; SUBCUTANEOUS at 10:07

## 2024-07-22 RX ADMIN — SEVELAMER CARBONATE 800 MG: 800 TABLET, FILM COATED ORAL at 01:07

## 2024-07-22 RX ADMIN — HYDRALAZINE HYDROCHLORIDE 25 MG: 25 TABLET ORAL at 01:07

## 2024-07-22 RX ADMIN — GABAPENTIN 200 MG: 100 CAPSULE ORAL at 09:07

## 2024-07-22 RX ADMIN — DIPHENHYDRAMINE HYDROCHLORIDE 25 MG: 25 CAPSULE ORAL at 09:07

## 2024-07-22 RX ADMIN — LEVETIRACETAM 500 MG: 500 TABLET, FILM COATED ORAL at 09:07

## 2024-07-22 RX ADMIN — SEVELAMER CARBONATE 800 MG: 800 TABLET, FILM COATED ORAL at 05:07

## 2024-07-22 RX ADMIN — PANTOPRAZOLE SODIUM 40 MG: 40 TABLET, DELAYED RELEASE ORAL at 05:07

## 2024-07-22 RX ADMIN — APIXABAN 5 MG: 5 TABLET, FILM COATED ORAL at 09:07

## 2024-07-22 RX ADMIN — MORPHINE SULFATE 2 MG: 2 INJECTION, SOLUTION INTRAMUSCULAR; INTRAVENOUS at 09:07

## 2024-07-22 RX ADMIN — FLUTICASONE PROPIONATE 50 MCG: 50 SPRAY, METERED NASAL at 08:07

## 2024-07-22 RX ADMIN — SEVELAMER CARBONATE 800 MG: 800 TABLET, FILM COATED ORAL at 07:07

## 2024-07-22 RX ADMIN — DIPHENHYDRAMINE HYDROCHLORIDE 25 MG: 25 CAPSULE ORAL at 03:07

## 2024-07-22 RX ADMIN — GABAPENTIN 200 MG: 100 CAPSULE ORAL at 08:07

## 2024-07-22 RX ADMIN — HYDRALAZINE HYDROCHLORIDE 25 MG: 25 TABLET ORAL at 05:07

## 2024-07-22 RX ADMIN — FLUOXETINE 20 MG: 20 CAPSULE ORAL at 08:07

## 2024-07-22 RX ADMIN — MUPIROCIN 1 G: 20 OINTMENT TOPICAL at 08:07

## 2024-07-22 RX ADMIN — INSULIN GLARGINE 15 UNITS: 100 INJECTION, SOLUTION SUBCUTANEOUS at 09:07

## 2024-07-22 RX ADMIN — LEVETIRACETAM 500 MG: 500 TABLET, FILM COATED ORAL at 08:07

## 2024-07-22 RX ADMIN — MORPHINE SULFATE 2 MG: 2 INJECTION, SOLUTION INTRAMUSCULAR; INTRAVENOUS at 02:07

## 2024-07-22 RX ADMIN — MUPIROCIN 1 G: 20 OINTMENT TOPICAL at 09:07

## 2024-07-22 NOTE — CARE UPDATE
07/21/24 2002   Patient Assessment/Suction   Level of Consciousness (AVPU) responds to voice   Respiratory Effort Unlabored   Expansion/Accessory Muscles/Retractions expansion symmetric   All Lung Fields Breath Sounds coarse   Rhythm/Pattern, Respiratory depth regular;pattern regular   Cough Frequency infrequent   Cough Type good;nonproductive   PRE-TX-O2   Device (Oxygen Therapy) nasal cannula   $ Is the patient on Low Flow Oxygen? Yes   Flow (L/min) (Oxygen Therapy) 4   SpO2 100 %   Pulse Oximetry Type Continuous   $ Pulse Oximetry - Multiple Charge Pulse Oximetry - Multiple   Pulse 71   Resp 15   Education   $ Education Oxygen;15 min

## 2024-07-22 NOTE — CONSULTS
Diet Education    Reviewed carb choices and serving sizes of 1 choice. Pairing carbohydrates with a fat, fiber, or protein to prevent increase in blood glucose.  Pt states she cooks at home and has been working on portion control. Educated pt on decreasing sodium in diet. To try not to cook with salt and use herbs and spices to make food more flavorful. Explained that sodium and fluid need to be reduced to avoid extra weight gain between HD treatments. Educated patient on the importance of a low potassium, low phosphorus, low sodium, and controlling protein and fluid intake. Educated patient on why decreasing intake of low potassium and phosphorus foods is important and what happens when there is a build up of potassium and phosphorus in the body ( muscle aches, brittle bones, calcification of hear, skin, joints, and blood vessels, irregular heart beats, and heart strain. Pt states she was recently started on a phosphorus binder. Explained they need to be taken with each meals to reduce phosphorus levels in the blood. Reviewed food high in phosphorus. States she did get material from her RD at her HD facility. RD available prn.

## 2024-07-22 NOTE — ASSESSMENT & PLAN NOTE
"Patient's FSGs are controlled on current medication regimen.  Last A1c reviewed-   Lab Results   Component Value Date    HGBA1C 8.5 (H) 06/23/2024     Most recent fingerstick glucose reviewed- No results for input(s): "POCTGLUCOSE" in the last 24 hours.  Current correctional scale  Low  Maintain anti-hyperglycemic dose as follows-   Antihyperglycemics (From admission, onward)      Start     Stop Route Frequency Ordered    07/21/24 1622  insulin aspart U-100 pen 0-5 Units         -- SubQ Before meals & nightly PRN 07/21/24 1522    07/21/24 1600  insulin glargine U-100 (Lantus) pen 15 Units         -- SubQ Nightly 07/21/24 1522          Hold Oral hypoglycemics while patient is in the hospital.    "

## 2024-07-22 NOTE — ASSESSMENT & PLAN NOTE
Patient has chronic thrombocytopenia.  We will monitor platelet count daily.  Hold DVT prophylaxis if platelets are <50k. The patient's platelet results have been reviewed and are listed below.  Recent Labs   Lab 07/22/24  0324   *

## 2024-07-22 NOTE — PROGRESS NOTES
INPATIENT NEPHROLOGY Progress Note  [facility]    Patient Name: Tabby Howard  Date: 07/22/2024    Reason for consultation: ESRD    Chief Complaint:   Chief Complaint   Patient presents with    Abdominal Pain     Patient was at dialysis and complained of lower back pain, did not finish  only took 3 kg at dialysis.  Has missed about 8 appointments in the last 6 weeks of dialysis. CBG was 58, D10 given.  SpO2 did drop from 97 to 79% on room.  Patient is on 3 liters.         History of Present Illness:  This is a 34-year-old female with end-stage kidney disease and on dialysis presented to the hospital after not feeling well during dialysis. In the middle of dialysis the patient had abdominal discomfort and complained of diarrhea. Dialysis was stopped and the patient was sent to the hospital. She got 2L off. She was 12L above dry weight. Workup included CT of the abdomen and pelvis which resulted in diffuse body wall edema, mild circumferential wall thickening of the distal descending colon, sigmoid colon and rectum. There was also circumferential bladder wall thickening. This process could correlate with infectious/inflammatory process. Patient was given antibiotics empirically in the ER. Patient has chronic pain and complaints of abdominal pain. No major fever or chills. Consulted for dialysis.    Interval History:  7/17- IUF today, HD/UF MWF  7/18  had IUF only yesterday, K+ a little elevated, will run regular tx today.  Pt starting to feel better w/fluid off.  Hgb dropping, ordered epo w/HD on MWF.  7/19  Patient seen on hemodialysis for uremic clearance and ultrafiltration.    7/20 low BP this AM- holding all BP meds- repeating echo- spoke with Dr. Tompkins and pericardial effusion small on last echo- Dr. Smith will see patient today  7/21 labs showed DKA yest- transferred to ICU for insulin gtt- BP all over the place, on 2L NC, repeat echo shows moderate circumferential effusion-  echolucent-   no indication of cardiac tamponade, volume status improved  7/22  AFVSS, no acute events     Plan of Care:    Assessment:  ESRD on HD MWF  Pericardial effusion- uremia, volume overload; HTN/D CHF  DKA  SHPT  Anemia of CKD  Thrombocytopenia    Plan:    - routine HD MWF  - echo reviewed- continue UF with HD- renal diet, 1.5L fluid restriction- continue hydralazine- eventually resume coreg and norvasc  - on DKA pathway- ok with gentle IVFs if NPO  - continue binder with meals  - transfused this admission- continue SHELLEY with HD  - hold heparin with HD  --iron panel      Thank you for allowing us to participate in this patient's care. We will continue to follow.    Vital Signs:  Temp Readings from Last 3 Encounters:   07/22/24 98 °F (36.7 °C) (Oral)   06/23/24 98.2 °F (36.8 °C) (Oral)   05/13/24 98.3 °F (36.8 °C) (Oral)       Pulse Readings from Last 3 Encounters:   07/22/24 71   06/23/24 90   05/13/24 75       BP Readings from Last 3 Encounters:   07/22/24 (!) 132/58   06/23/24 (!) 169/85   05/13/24 (!) 149/79       Weight:  Wt Readings from Last 3 Encounters:   07/16/24 65 kg (143 lb 4.8 oz)   07/20/24 65.9 kg (145 lb 4.5 oz)   07/17/24 64.9 kg (143 lb)       Medications:  Scheduled Meds:   apixaban  5 mg Oral BID    epoetin teresa-epbx  10,000 Units Intravenous Every Mon, Wed, Fri    FLUoxetine  20 mg Oral Daily    fluticasone propionate  1 spray Each Nostril Daily    gabapentin  200 mg Oral BID    hydrALAZINE  25 mg Oral Q8H    insulin glargine U-100  15 Units Subcutaneous QHS    levETIRAcetam  500 mg Oral BID    mupirocin   Nasal BID    pantoprazole  40 mg Oral Before breakfast    sevelamer carbonate  800 mg Oral TID WM     Continuous Infusions:      PRN Meds:.  Current Facility-Administered Medications:     acetaminophen, 650 mg, Oral, Q4H PRN    albuterol sulfate, 2.5 mg, Nebulization, Q4H PRN    aluminum-magnesium hydroxide-simethicone, 30 mL, Oral, QID PRN    dextrose 50%, 12.5 g, Intravenous, PRN    dextrose 50%,  25 g, Intravenous, PRN    diphenhydrAMINE, 25 mg, Oral, Q6H PRN    glucagon (human recombinant), 1 mg, Intramuscular, PRN    glucose, 16 g, Oral, PRN    glucose, 24 g, Oral, PRN    HYDROcodone-acetaminophen, 1 tablet, Oral, Q4H PRN    insulin aspart U-100, 0-5 Units, Subcutaneous, QID (AC + HS) PRN    magnesium oxide, 800 mg, Oral, PRN    magnesium oxide, 800 mg, Oral, PRN    melatonin, 6 mg, Oral, Nightly PRN    morphine, 2 mg, Intravenous, Q4H PRN    naloxone, 0.02 mg, Intravenous, PRN    nitroGLYCERIN, 0.4 mg, Sublingual, Q5 Min PRN    ondansetron, 4 mg, Intravenous, Q6H PRN    ondansetron, 4 mg, Intravenous, Q6H PRN    potassium bicarbonate, 35 mEq, Oral, PRN    potassium bicarbonate, 50 mEq, Oral, PRN    potassium bicarbonate, 60 mEq, Oral, PRN    potassium, sodium phosphates, 2 packet, Oral, PRN    potassium, sodium phosphates, 2 packet, Oral, PRN    potassium, sodium phosphates, 2 packet, Oral, PRN    sodium chloride 0.9%, 10 mL, Intravenous, PRN  No current facility-administered medications on file prior to encounter.     Current Outpatient Medications on File Prior to Encounter   Medication Sig Dispense Refill    acetaZOLAMIDE (DIAMOX) 250 MG tablet take 1 tablet by mouth 3 times a day (Patient taking differently: Take 250 mg by mouth 3 (three) times daily.) 90 tablet 3    albuterol (VENTOLIN HFA) 90 mcg/actuation inhaler Inhale 2 puffs every 4 hours by inhalation route. (Patient taking differently: Inhale 2 puffs into the lungs every 4 (four) hours as needed for Shortness of Breath or Wheezing.) 8 g 2    amLODIPine (NORVASC) 5 MG tablet Take 1 tablet (5 mg total) by mouth once daily. 30 tablet 11    apixaban (ELIQUIS) 5 mg Tab Take 1 tablet (5 mg total) by mouth 2 (two) times daily. Patient w/ thrombocytopenia <50, so held during admission, follow-up with hematologist about restarting 180 tablet 1    atropine 1% (ISOPTO ATROPINE) 1 % Drop Place 1 drop into the left eye 2 (two) times a day. 15 mL 3    blood  sugar diagnostic (TRUE METRIX GLUCOSE TEST STRIP) Strp Use 1 strip to check blood glucose three times daily 100 each 11    blood-glucose meter (TRUE METRIX GLUCOSE METER) Misc Use to check blood glucose 1 each 0    blood-glucose meter,continuous (DEXCOM ) Misc USE WITH SENSORS 1 each 0    blood-glucose sensor Heather CHANGE EVERY 10 DAYS 3 each 0    brimonidine 0.15 % OPTH DROP (ALPHAGAN) 0.15 % ophthalmic solution Place 1 drop into both eyes 3 (three) times daily. 15 mL 3    brinzolamide (AZOPT) 1 % ophthalmic suspension Place1 drop in left eye 3 times a day for 30 days (Patient taking differently: Place 1 drop into both eyes 3 (three) times daily.) 15 mL 6    busPIRone (BUSPAR) 10 MG tablet Take 1 tablet (10 mg total) by mouth 3 (three) times daily as needed (anxiety). 60 tablet 5    carvediloL (COREG) 25 MG tablet take 1 tablet Oral 2 times daily (Patient taking differently: Take 25 mg by mouth 2 (two) times daily.) 60 tablet 0    cetirizine (ZYRTEC) 10 MG tablet Take 1 tablet every day by oral route. (Patient taking differently: Take 10 mg by mouth once daily.) 30 tablet 0    dorzolamide-timolol 2-0.5% (COSOPT) 22.3-6.8 mg/mL ophthalmic solution place 1 drop in left eye twice a day  for 30 days (Patient taking differently: Place 1 drop into both eyes 2 (two) times daily.) 10 mL 0    FLUoxetine 20 MG capsule Take 1 capsule every day by oral route for 90 days. (Patient taking differently: Take 20 mg by mouth once daily.) 90 capsule 1    FLUoxetine 40 MG capsule Take 1 capsule every day by oral route. (Patient taking differently: Take 40 mg by mouth once daily.) 30 capsule 2    fluticasone propionate (FLONASE ALLERGY RELIEF) 50 mcg/actuation nasal spray [place] 1 spray every day by intranasal route. (Patient taking differently: 1 spray by Each Nostril route once daily.) 16 g 2    gabapentin (NEURONTIN) 300 MG capsule Take 2 capsules twice a day by oral route for 90 days. (Patient taking differently: Take 600 mg  "by mouth 2 (two) times daily.) 360 capsule 1    hydrALAZINE (APRESOLINE) 25 MG tablet take 1 tablet by mouth every 8 hours (Patient taking differently: Take 25 mg by mouth every 8 (eight) hours.) 90 tablet 0    insulin detemir U-100, Levemir, (LEVEMIR FLEXTOUCH U100 INSULIN) 100 unit/mL (3 mL) InPn pen Inject 22 Units into the skin every evening. Patient not needing insulin in-patient. Follow-up with PCP for hospital follow-up and restarting the medication. Or restart medication if glucose >200 consistently resume home insulin regimen. Or if glucose is persistently less than 100, decrease by 5 units until following up with PCP 15 mL 1    insulin glargine U-100, Lantus, 100 unit/mL injection inject 13 unit subcutaneously 2 times daily (Patient taking differently: Inject 13 Units into the skin 2 (two) times a day.) 10 mL 0    isosorbide mononitrate (IMDUR) 30 MG 24 hr tablet Take 1 tablet (30 mg total) by mouth once daily. 90 tablet 1    lancets (TRUEPLUS LANCETS) 33 gauge Misc Use 1 to check blood glucose three times daily 100 each 11    lancets 33 gauge Misc Use to test twice daily 100 each 5    levETIRAcetam (KEPPRA) 500 MG Tab Take 1 tablet (500 mg total) by mouth 2 (two) times daily. 60 tablet 11    LORazepam (ATIVAN) 0.5 MG tablet Take 1 tablet 3 times a day by oral route for 7 days, for severe panic. (Patient taking differently: Take 0.5 mg by mouth 3 (three) times daily.) 21 tablet 2    ondansetron (ZOFRAN-ODT) 4 MG TbDL Place 1 tablet (4 mg) on or under the tongue every 8 hours for 10 days. (Patient taking differently: Take 8 mg by mouth every 8 (eight) hours as needed.) 24 tablet 2    oxyCODONE-acetaminophen (PERCOCET)  mg per tablet Take 1 tablet by mouth every 4 (four) hours as needed for Pain. 42 tablet 0    pantoprazole (PROTONIX) 40 MG tablet Take 1 tablet (40 mg total) by mouth once daily. 30 tablet 0    pen needle, diabetic 31 gauge x 3/16" Ndle Use as directed with insulin once daily 100 each 0 "    prednisoLONE acetate (PRED FORTE) 1 % DrpS Place 1 drop into the left eye 2 (two) times daily. 5 mL 3    predniSONE (DELTASONE) 20 MG tablet take 3 tablets Oral Daily,x30 day(s) (Patient taking differently: Take 20 mg by mouth once daily.) 90 tablet 0    sevelamer carbonate (RENVELA) 800 mg Tab Take 1 tablet (800 mg total) by mouth 3 (three) times daily with meals. 180 tablet 11    sucralfate (CARAFATE) 100 mg/mL suspension Take 10 mL 4 times a day by oral route before meals for 30 days. (Patient taking differently: Take 10 mLs by mouth 4 (four) times daily with meals and nightly.) 1200 mL 1    timolol maleate 0.5% (TIMOPTIC) 0.5 % Drop Place 1 drop in left eye 2 times a day for 30 days (Patient taking differently: Place 1 drop into both eyes 2 (two) times daily.) 5 mL 6    tobramycin-dexAMETHasone 0.3-0.1% (TOBRADEX) 0.3-0.1 % DrpS 1 drop Eye-Left every 6 hours for 5 day(s) 5 mL 0    [DISCONTINUED] atenoloL (TENORMIN) 50 MG tablet Take 1 tablet (50 mg total) by mouth every other day. 45 tablet 3    [DISCONTINUED] omeprazole (PRILOSEC) 20 MG capsule Take 2 capsules (40 mg total) by mouth once daily. for 10 days 20 capsule 0       Review of Systems:  neg    Physical Exam:  Constitutional: nad, aao x 3  Heart: rrr, no m/r/g, wwp, + edema  Lungs: ctab, no w/r/r/c, no lb  Abdomen: s/nt/d, +BS      Results:  Lab Results   Component Value Date     07/22/2024    K 4.6 07/22/2024     07/22/2024    CO2 23 07/22/2024    BUN 57 (H) 07/22/2024    CREATININE 6.8 (H) 07/22/2024    CALCIUM 8.0 (L) 07/22/2024    ANIONGAP 13 07/22/2024    ESTGFRAFRICA 19 (L) 09/03/2022    EGFRNONAA 18 (A) 07/31/2022       Lab Results   Component Value Date    CALCIUM 8.0 (L) 07/22/2024    PHOS 7.5 (H) 06/22/2024       Recent Labs   Lab 07/22/24  0324   WBC 9.04   RBC 2.59*   HGB 7.7*   HCT 24.0*   *   MCV 93   MCH 29.7   MCHC 32.1     Patient care time was spent personally by me on the following activities: > 35  minutes  Obtaining a history.  Examination of patient.  Providing medical care at the patients bedside.  Developing a treatment plan with patient or surrogate and bedside caregivers.  Ordering and reviewing laboratory studies, radiographic studies, pulse oximetry.  Ordering and performing treatments and interventions.  Evaluation of patient's response to treatment.  Discussions with consultants while on the unit and immediately available to the patient.  Re-evaluation of the patient's condition.  Documentation in the medical record.      Hugh Figueroa MD    [facility] [address] [address]  [phone] (p)  [phone] (f)

## 2024-07-22 NOTE — ASSESSMENT & PLAN NOTE
CT of the chest showed moderate to severe pericardial effusion.  Echo revealed small effusion.  A 2nd echo showed moderate size effusion.    No evidence of tamponade.    Continue dialysis for fluid removal.  Will obtain a 3rd echo to evaluate the effusion.

## 2024-07-22 NOTE — NURSING
Consent and Hep b status verified, removed 4000 uf net, no issues cannulating AVF, post thrill and bruit noted, sites bled >10 min. Patient stable throughout treatment. Educated patient on fluid intake. Report given to SHAILA Martin     07/22/24 1250   Handoff Report   Received From Eun   Given To Veronica   Vital Signs   Temp 98 °F (36.7 °C)   Temp Source Oral   Pulse 80   Heart Rate Source Monitor   Resp 16   SpO2 97 %   Pulse Oximetry Type Continuous   Probe Placed On (Pulse Ox) Right:;finger   Device (Oxygen Therapy) room air   BP (!) 130/55   BP Location Right leg   BP Method Automatic   Patient Position Lying   Assessments (Pre/Post)   Consent Obtained yes   Safety vein preservation armband present   Date Hepatitis Profile Obtained 02/27/24   Blood Liters Processed (BLP) 62   Transport Modality bed   Level of Consciousness (AVPU) alert   Dialyzer Clearance mildly streaked   Pain   Preferred Pain Scale number (Numeric Rating Pain Scale)   Comfort/Acceptable Pain Level 2   Pain Rating (0-10): Rest 0   Pain Rating (0-10): Activity 0   Pain Management Interventions quiet environment facilitated;position adjusted;pillow support provided   Pain/Comfort Interventions   Fever Reduction/Comfort Measures lightweight clothing;lightweight bedding   Pre-Hemodialysis Assessment   Additional Dialysis Information Needed Yes   Patient Status Departed   Treatment Consent Verified Yes   Treatment Status Completed        Hemodialysis AV Fistula Left upper arm   No placement date or time found.   Location: Left upper arm   Site Assessment Clean;Dry;Intact   Patency Present;Thrill;Bruit   Status Deaccessed   Dressing Intervention First dressing   Dressing Status Clean;Dry;Intact   Site Condition No complications   Dressing Gauze   Post-Hemodialysis Assessment   Rinseback Volume (mL) 250 mL   Blood Volume Processed (Liters) 62 L   Dialyzer Clearance Lightly streaked   Duration of Treatment 180 minutes   Additional Fluid Intake (mL)  500 mL   Total UF (mL) 4500 mL   Net Fluid Removal 4000   Patient Response to Treatment stable   Post-Treatment Weight 61.9 kg (136 lb 7.4 oz)   Treatment Weight Change -4   Arterial bleeding stop time (min) 10 min   Venous bleeding stop time (min) 10 min   Post-Hemodialysis Comments stable

## 2024-07-22 NOTE — ASSESSMENT & PLAN NOTE
Patient's anemia is currently stable.  Received 1 unit of blood two days ago.. Etiology likely d/t chronic disease due to Chronic Kidney Disease  Current CBC reviewed-   Lab Results   Component Value Date    HGB 7.7 (L) 07/22/2024    HCT 24.0 (L) 07/22/2024     Monitor serial CBC and transfuse if patient becomes hemodynamically unstable, symptomatic or H/H drops below 7/21.

## 2024-07-22 NOTE — SUBJECTIVE & OBJECTIVE
Interval History:  Diabetic ketoacidosis resolved.  Back on subcutaneous insulin.  She feels well and is eating well.  Plan is to get another echocardiogram to evaluate the pericardial effusion.    Review of Systems   Constitutional:  Negative for fever.   Gastrointestinal:  Negative for abdominal pain.     Objective:     Vital Signs (Most Recent):  Temp: 98 °F (36.7 °C) (07/22/24 1250)  Pulse: 85 (07/22/24 1300)  Resp: 20 (07/22/24 1300)  BP: (!) 134/58 (07/22/24 1300)  SpO2: 99 % (07/22/24 1300) Vital Signs (24h Range):  Temp:  [97.9 °F (36.6 °C)-98.2 °F (36.8 °C)] 98 °F (36.7 °C)  Pulse:  [67-85] 85  Resp:  [8-30] 20  SpO2:  [89 %-100 %] 99 %  BP: (101-184)/(45-81) 134/58     Weight: 65 kg (143 lb 4.8 oz)  Body mass index is 26.21 kg/m².    Intake/Output Summary (Last 24 hours) at 7/22/2024 1334  Last data filed at 7/22/2024 1250  Gross per 24 hour   Intake 750 ml   Output 4500 ml   Net -3750 ml         Physical Exam  Constitutional:       General: She is not in acute distress.     Appearance: She is ill-appearing.   Eyes:      General:         Right eye: No discharge.         Left eye: No discharge.   Neck:      Vascular: No JVD.   Cardiovascular:      Rate and Rhythm: Normal rate and regular rhythm.   Pulmonary:      Effort: Pulmonary effort is normal.      Breath sounds: Normal breath sounds.   Abdominal:      General: Abdomen is flat. Bowel sounds are normal. There is no distension.      Palpations: Abdomen is soft.      Tenderness: There is no abdominal tenderness.   Musculoskeletal:      Right lower leg: Edema present.      Left lower leg: Edema present.   Skin:     General: Skin is warm and moist.      Findings: No rash.   Neurological:      Mental Status: She is alert and oriented to person, place, and time.   Psychiatric:         Attention and Perception: Attention normal.         Mood and Affect: Affect is flat.         Speech: Speech normal.             Significant Labs: All pertinent labs within the  past 24 hours have been reviewed.    Significant Imaging:  No new imaging

## 2024-07-22 NOTE — PROGRESS NOTES
[facility] Medicine  Progress Note    Patient Name: Tabby Howard  MRN: [id number]  Patient Class: IP- Inpatient   Admission Date: 7/16/2024  Length of Stay: 4 days  Attending Physician: Jason Prees MD  Primary Care Provider: Melony Kim NP        Subjective:     Principal Problem:DKA, type 2        HPI:  This is a 34-year-old female with end-stage kidney disease and on dialysis presented to the hospital after not feeling well during dialysis.  In the middle of dialysis the patient had abdominal discomfort and complained of diarrhea.  Dialysis was stopped and the patient was sent to the hospital.  Workup included CT of the abdomen and pelvis which resulted in diffuse body wall edema, mild circumferential wall thickening of the distal descending colon, sigmoid colon and rectum.  There was also circumferential bladder wall thickening.  This process could correlate with infectious/inflammatory process.  Patient was given antibiotics empirically in the ER.  Patient has chronic pain and complaints of abdominal pain.  No major fever or chills.  Nephrology was informed and they would like the patient to be admitted so that she complete her dialysis tomorrow.    Overview/Hospital Course:  The patient was admitted and placed on prn pain meds. Her presentation was similar to past presentations of chronic abdominal pain. It was thought the findings on abdominal and pelvic CT were most consistent with fluid overload due to missed dialysis sessions. Nephrology  was consulted and dialysis orders were placed. Echo was ordered to evaluate pericardial effusion seen on CT, and revealed small effusion with tamponade. Cardiology consulted. Recommended continue dialysis to remove fluid, and felt this would be adequate to remove the pericardial effusion. Recommended repeating echo prior to discharge.     Interval History:  Diabetic ketoacidosis resolved.  Back on subcutaneous insulin.  She feels  well and is eating well.  Plan is to get another echocardiogram to evaluate the pericardial effusion.    Review of Systems   Constitutional:  Negative for fever.   Gastrointestinal:  Negative for abdominal pain.     Objective:     Vital Signs (Most Recent):  Temp: 98 °F (36.7 °C) (07/22/24 1250)  Pulse: 85 (07/22/24 1300)  Resp: 20 (07/22/24 1300)  BP: (!) 134/58 (07/22/24 1300)  SpO2: 99 % (07/22/24 1300) Vital Signs (24h Range):  Temp:  [97.9 °F (36.6 °C)-98.2 °F (36.8 °C)] 98 °F (36.7 °C)  Pulse:  [67-85] 85  Resp:  [8-30] 20  SpO2:  [89 %-100 %] 99 %  BP: (101-184)/(45-81) 134/58     Weight: 65 kg (143 lb 4.8 oz)  Body mass index is 26.21 kg/m².    Intake/Output Summary (Last 24 hours) at 7/22/2024 1334  Last data filed at 7/22/2024 1250  Gross per 24 hour   Intake 750 ml   Output 4500 ml   Net -3750 ml         Physical Exam  Constitutional:       General: She is not in acute distress.     Appearance: She is ill-appearing.   Eyes:      General:         Right eye: No discharge.         Left eye: No discharge.   Neck:      Vascular: No JVD.   Cardiovascular:      Rate and Rhythm: Normal rate and regular rhythm.   Pulmonary:      Effort: Pulmonary effort is normal.      Breath sounds: Normal breath sounds.   Abdominal:      General: Abdomen is flat. Bowel sounds are normal. There is no distension.      Palpations: Abdomen is soft.      Tenderness: There is no abdominal tenderness.   Musculoskeletal:      Right lower leg: Edema present.      Left lower leg: Edema present.   Skin:     General: Skin is warm and moist.      Findings: No rash.   Neurological:      Mental Status: She is alert and oriented to person, place, and time.   Psychiatric:         Attention and Perception: Attention normal.         Mood and Affect: Affect is flat.         Speech: Speech normal.             Significant Labs: All pertinent labs within the past 24 hours have been reviewed.    Significant Imaging:  No new imaging    Assessment/Plan:  "     * DKA, type 2  Resolved    Seizures  Continue Keppra.  No reports of seizure activity.    MDD (major depressive disorder)  Fluoxetine 20 mg daily    Hypertension  Outpatient antihypertensives include:    Amlodipine 5 mg daily   Carvedilol 25 mg p.o. b.i.d.   Hydralazine 25 mg t.i.d.  isosorbide mononitrate 30 mg daily    We have restarted her hydralazine.  Her blood pressure is very labile.  We will keep monitoring closely.    Anemia in ESRD (end-stage renal disease)  Patient's anemia is currently stable.  Received 1 unit of blood two days ago.. Etiology likely d/t chronic disease due to Chronic Kidney Disease  Current CBC reviewed-   Lab Results   Component Value Date    HGB 7.7 (L) 07/22/2024    HCT 24.0 (L) 07/22/2024     Monitor serial CBC and transfuse if patient becomes hemodynamically unstable, symptomatic or H/H drops below 7/21.    Type 2 diabetes mellitus with hyperglycemia, with long-term current use of insulin, previous HbA1c 5.8%  Patient's FSGs are controlled on current medication regimen.  Last A1c reviewed-   Lab Results   Component Value Date    HGBA1C 8.5 (H) 06/23/2024     Most recent fingerstick glucose reviewed- No results for input(s): "POCTGLUCOSE" in the last 24 hours.  Current correctional scale  Low  Maintain anti-hyperglycemic dose as follows-   Antihyperglycemics (From admission, onward)      Start     Stop Route Frequency Ordered    07/21/24 1622  insulin aspart U-100 pen 0-5 Units         -- SubQ Before meals & nightly PRN 07/21/24 1522    07/21/24 1600  insulin glargine U-100 (Lantus) pen 15 Units         -- SubQ Nightly 07/21/24 1522          Hold Oral hypoglycemics while patient is in the hospital.      Thrombocytopenia  Patient has chronic thrombocytopenia.  We will monitor platelet count daily.  Hold DVT prophylaxis if platelets are <50k. The patient's platelet results have been reviewed and are listed below.  Recent Labs   Lab 07/22/24  0324   *           Deep vein " thrombosis (DVT) of non-extremity vein  Continue apixaban      ESRD (end stage renal disease)  Nephrology  on the case.  Patient routine hemodialysis.    Pericardial effusion  CT of the chest showed moderate to severe pericardial effusion.  Echo revealed small effusion.  A 2nd echo showed moderate size effusion.    No evidence of tamponade.    Continue dialysis for fluid removal.  Will obtain a 3rd echo to evaluate the effusion.      VTE Risk Mitigation (From admission, onward)           Ordered     apixaban tablet 5 mg  2 times daily         07/17/24 1424     Reason for No Pharmacological VTE Prophylaxis  Once        Question:  Reasons:  Answer:  Already adequately anticoagulated on oral Anticoagulants    07/16/24 2030     IP VTE HIGH RISK PATIENT  Once         07/16/24 2030     Place sequential compression device  Until discontinued         07/16/24 2030                    Discharge Planning   TATIANA: 7/24/2024     Code Status: Full Code   Is the patient medically ready for discharge?:     Reason for patient still in hospital (select all that apply): Patient trending condition and Treatment  Discharge Plan A: Home with family               Jason Peres MD  Department of Hospital Medicine   [facility]

## 2024-07-23 VITALS
SYSTOLIC BLOOD PRESSURE: 103 MMHG | RESPIRATION RATE: 28 BRPM | WEIGHT: 143.31 LBS | OXYGEN SATURATION: 96 % | BODY MASS INDEX: 26.37 KG/M2 | TEMPERATURE: 99 F | HEART RATE: 90 BPM | HEIGHT: 62 IN | DIASTOLIC BLOOD PRESSURE: 44 MMHG

## 2024-07-23 PROBLEM — E11.10 DKA, TYPE 2: Status: RESOLVED | Noted: 2024-07-21 | Resolved: 2024-07-23

## 2024-07-23 LAB
ALBUMIN SERPL BCP-MCNC: 3.4 G/DL (ref 3.5–5.2)
ALP SERPL-CCNC: 116 U/L (ref 55–135)
ALT SERPL W/O P-5'-P-CCNC: 33 U/L (ref 10–44)
ANION GAP SERPL CALC-SCNC: 7 MMOL/L (ref 8–16)
AST SERPL-CCNC: 14 U/L (ref 10–40)
BASOPHILS # BLD AUTO: 0.05 K/UL (ref 0–0.2)
BASOPHILS NFR BLD: 0.5 % (ref 0–1.9)
BILIRUB SERPL-MCNC: 1.7 MG/DL (ref 0.1–1)
BLD PROD TYP BPU: NORMAL
BLOOD UNIT EXPIRATION DATE: NORMAL
BLOOD UNIT TYPE CODE: 6200
BLOOD UNIT TYPE: NORMAL
BUN SERPL-MCNC: 29 MG/DL (ref 6–20)
CALCIUM SERPL-MCNC: 8.4 MG/DL (ref 8.7–10.5)
CHLORIDE SERPL-SCNC: 104 MMOL/L (ref 95–110)
CO2 SERPL-SCNC: 27 MMOL/L (ref 23–29)
CODING SYSTEM: NORMAL
CREAT SERPL-MCNC: 4.8 MG/DL (ref 0.5–1.4)
CROSSMATCH INTERPRETATION: NORMAL
DIFFERENTIAL METHOD BLD: ABNORMAL
DISPENSE STATUS: NORMAL
EOSINOPHIL # BLD AUTO: 0.2 K/UL (ref 0–0.5)
EOSINOPHIL NFR BLD: 1.7 % (ref 0–8)
ERYTHROCYTE [DISTWIDTH] IN BLOOD BY AUTOMATED COUNT: 21.4 % (ref 11.5–14.5)
EST. GFR  (NO RACE VARIABLE): 11.5 ML/MIN/1.73 M^2
FERRITIN SERPL-MCNC: 2193.7 NG/ML (ref 20–300)
GLUCOSE SERPL-MCNC: 159 MG/DL (ref 70–110)
GLUCOSE SERPL-MCNC: 59 MG/DL (ref 70–110)
GLUCOSE SERPL-MCNC: 68 MG/DL (ref 70–110)
GLUCOSE SERPL-MCNC: 70 MG/DL (ref 70–110)
GLUCOSE SERPL-MCNC: 86 MG/DL (ref 70–110)
HCT VFR BLD AUTO: 24.7 % (ref 37–48.5)
HGB BLD-MCNC: 7.8 G/DL (ref 12–16)
IMM GRANULOCYTES # BLD AUTO: 0.36 K/UL (ref 0–0.04)
IMM GRANULOCYTES NFR BLD AUTO: 3.9 % (ref 0–0.5)
IRON SERPL-MCNC: 121 UG/DL (ref 30–160)
LYMPHOCYTES # BLD AUTO: 1.3 K/UL (ref 1–4.8)
LYMPHOCYTES NFR BLD: 14.2 % (ref 18–48)
MAGNESIUM SERPL-MCNC: 2.2 MG/DL (ref 1.6–2.6)
MCH RBC QN AUTO: 29.9 PG (ref 27–31)
MCHC RBC AUTO-ENTMCNC: 31.6 G/DL (ref 32–36)
MCV RBC AUTO: 95 FL (ref 82–98)
MONOCYTES # BLD AUTO: 1.1 K/UL (ref 0.3–1)
MONOCYTES NFR BLD: 11.7 % (ref 4–15)
NEUTROPHILS # BLD AUTO: 6.2 K/UL (ref 1.8–7.7)
NEUTROPHILS NFR BLD: 68 % (ref 38–73)
NRBC BLD-RTO: 12 /100 WBC
NUM UNITS TRANS PACKED RBC: NORMAL
PLATELET # BLD AUTO: 99 K/UL (ref 150–450)
PMV BLD AUTO: 9.7 FL (ref 9.2–12.9)
POTASSIUM SERPL-SCNC: 4.6 MMOL/L (ref 3.5–5.1)
PROT SERPL-MCNC: 5.8 G/DL (ref 6–8.4)
RBC # BLD AUTO: 2.61 M/UL (ref 4–5.4)
SATURATED IRON: 64 % (ref 20–50)
SODIUM SERPL-SCNC: 138 MMOL/L (ref 136–145)
TOTAL IRON BINDING CAPACITY: 190 UG/DL (ref 250–450)
TRANSFERRIN SERPL-MCNC: 136 MG/DL (ref 200–375)
WBC # BLD AUTO: 9.2 K/UL (ref 3.9–12.7)

## 2024-07-23 PROCEDURE — 25000003 PHARM REV CODE 250: Performed by: INTERNAL MEDICINE

## 2024-07-23 PROCEDURE — 83540 ASSAY OF IRON: CPT | Performed by: INTERNAL MEDICINE

## 2024-07-23 PROCEDURE — 25000003 PHARM REV CODE 250: Performed by: HOSPITALIST

## 2024-07-23 PROCEDURE — 36415 COLL VENOUS BLD VENIPUNCTURE: CPT | Performed by: HOSPITALIST

## 2024-07-23 PROCEDURE — 99900035 HC TECH TIME PER 15 MIN (STAT)

## 2024-07-23 PROCEDURE — 85025 COMPLETE CBC W/AUTO DIFF WBC: CPT | Performed by: HOSPITALIST

## 2024-07-23 PROCEDURE — 84466 ASSAY OF TRANSFERRIN: CPT | Performed by: INTERNAL MEDICINE

## 2024-07-23 PROCEDURE — 83735 ASSAY OF MAGNESIUM: CPT | Performed by: HOSPITALIST

## 2024-07-23 PROCEDURE — 80053 COMPREHEN METABOLIC PANEL: CPT | Performed by: HOSPITALIST

## 2024-07-23 PROCEDURE — 82728 ASSAY OF FERRITIN: CPT | Performed by: INTERNAL MEDICINE

## 2024-07-23 RX ORDER — INSULIN GLARGINE 100 [IU]/ML
13 INJECTION, SOLUTION SUBCUTANEOUS 2 TIMES DAILY
Start: 2024-07-23

## 2024-07-23 RX ADMIN — MUPIROCIN 1 G: 20 OINTMENT TOPICAL at 08:07

## 2024-07-23 RX ADMIN — GABAPENTIN 200 MG: 100 CAPSULE ORAL at 08:07

## 2024-07-23 RX ADMIN — LEVETIRACETAM 500 MG: 500 TABLET, FILM COATED ORAL at 08:07

## 2024-07-23 RX ADMIN — FLUOXETINE 20 MG: 20 CAPSULE ORAL at 08:07

## 2024-07-23 RX ADMIN — SEVELAMER CARBONATE 800 MG: 800 TABLET, FILM COATED ORAL at 12:07

## 2024-07-23 RX ADMIN — DEXTROSE MONOHYDRATE 12.5 G: 25 INJECTION, SOLUTION INTRAVENOUS at 05:07

## 2024-07-23 RX ADMIN — FLUTICASONE PROPIONATE 50 MCG: 50 SPRAY, METERED NASAL at 08:07

## 2024-07-23 RX ADMIN — APIXABAN 5 MG: 5 TABLET, FILM COATED ORAL at 08:07

## 2024-07-23 RX ADMIN — PANTOPRAZOLE SODIUM 40 MG: 40 TABLET, DELAYED RELEASE ORAL at 05:07

## 2024-07-23 RX ADMIN — Medication 16 G: at 01:07

## 2024-07-23 RX ADMIN — SEVELAMER CARBONATE 800 MG: 800 TABLET, FILM COATED ORAL at 08:07

## 2024-07-23 RX ADMIN — SEVELAMER CARBONATE 800 MG: 800 TABLET, FILM COATED ORAL at 04:07

## 2024-07-23 NOTE — DISCHARGE SUMMARY
[facility] Medicine  Discharge Summary      Patient Name: Tabby Howard  MRN: [id number]  [facility]: [id number]  Patient Class: IP- Inpatient  Admission Date: 7/16/2024  Hospital Length of Stay: 5 days  Discharge Date and Time:  07/23/2024 5:11 PM  Attending Physician: Jason Peres MD   Discharging Provider: Jason Peres MD  Primary Care Provider: Melony Kim NP    Primary Care Team: Networked reference to record PCT     HPI:   This is a 34-year-old female with end-stage kidney disease and on dialysis presented to the hospital after not feeling well during dialysis.  In the middle of dialysis the patient had abdominal discomfort and complained of diarrhea.  Dialysis was stopped and the patient was sent to the hospital.  Workup included CT of the abdomen and pelvis which resulted in diffuse body wall edema, mild circumferential wall thickening of the distal descending colon, sigmoid colon and rectum.  There was also circumferential bladder wall thickening.  This process could correlate with infectious/inflammatory process.  Patient was given antibiotics empirically in the ER.  Patient has chronic pain and complaints of abdominal pain.  No major fever or chills.  Nephrology was informed and they would like the patient to be admitted so that she complete her dialysis tomorrow.    * No surgery found *      Hospital Course:   The patient was admitted and placed on prn pain meds. Her presentation was similar to past presentations of chronic abdominal pain. It was thought the findings on abdominal and pelvic CT were most consistent with fluid overload due to missed dialysis sessions. Nephrology  was consulted and dialysis orders were placed. Echo was ordered to evaluate pericardial effusion seen on CT, and it showed a moderate size pericardial effusion.  Cardiology consulted. Recommended to continue dialysis to remove fluid, and they felt this would be adequate to remove the pericardial  effusion. Recommended repeating echo prior to discharge.  She underwent for treatment sessions over the time that she was admitted, and 3rd echocardiogram showed a much smaller effusion.  While doing all this, she went into diabetic ketoacidosis and had to be put in ICU for IV insulin.  That problem resolved fairly quickly.  She felt well and had no further need for hospital monitoring.  She was sent home in good condition.    Physical Exam  Constitutional:       General: She is not in acute distress.     Appearance: She appears comfortable  Eyes:      General:         Right eye: No discharge.         Left eye: No discharge.   Neck:      Vascular: No JVD.   Cardiovascular:      Rate and Rhythm: Normal rate and regular rhythm.   Pulmonary:      Effort: Pulmonary effort is normal.      Breath sounds: Normal breath sounds.      Goals of Care Treatment Preferences:  Code Status: Full Code    Health care agent: Step-Mother Tanya Howard / Father Garcia Howard  Health care agent number: [phone] / [phone]                   Consults:   Consults (From admission, onward)          Status Ordering Provider     Inpatient consult to Registered Dietitian/Nutritionist  Once        Provider:  (Not yet assigned)    Completed TIFFANIE GARNICA     Inpatient consult to Vascular Surgery  Once        Provider:  Gianluca López MD    Acknowledged ASHLEE GUEVARA     Inpatient consult to Cardiology  Once        Provider:  Joce Tompkins MD    Completed PATI KNAPP     Inpatient consult to Nephrology  Once        Provider:  Prateek Clark MD    Completed PATI KNAPP            No new Assessment & Plan notes have been filed under this hospital service since the last note was generated.  Service: Hospital Medicine    Final Active Diagnoses:    Diagnosis Date Noted POA    Seizures [R56.9] 07/16/2024 Yes    MDD (major depressive disorder) [F32.9] 08/18/2023 Yes    Hypertension [I10] 06/17/2023 Yes    Anemia in ESRD  (end-stage renal disease) [N18.6, D63.1] 03/20/2023 Yes    Type 2 diabetes mellitus with hyperglycemia, with long-term current use of insulin, previous HbA1c 5.8% [E11.65, Z79.4] 01/27/2023 Not Applicable    Thrombocytopenia [D69.6] 10/26/2022 Yes     Chronic    Deep vein thrombosis (DVT) of non-extremity vein [I82.90] 07/26/2022 Yes     Chronic    ESRD (end stage renal disease) [N18.6] 07/22/2022 Yes    Pericardial effusion [I31.39] 03/07/2022 Yes      Problems Resolved During this Admission:    Diagnosis Date Noted Date Resolved POA    PRINCIPAL PROBLEM:  DKA, type 2 [E11.10] 07/21/2024 07/23/2024 No       Discharged Condition: good    Disposition: Home or Self Care    Follow Up:   Follow-up Information       Keep your dialysis appointments on Tues, Thurs, Sat Follow up.               Melony Kim NP Follow up.    Specialty: Family Medicine  Why: office to contact patient to schedule hospital follow up  Contact information:  [address]  [facility] [address]  [phone]               Taylor, Dustin W., MD. Go on 8/2/2024.    Specialties: Cardiovascular Disease, Cardiology  Why: at 1:00pm for hospital follow up  Contact information:  [address] [facility]  [address]  [place] [address]  [phone]                           Patient Instructions:      Ambulatory referral/consult to Pain Clinic   Standing Status: Future   Referral Priority: Routine Referral Type: Consultation   Referral Reason: Specialty Services Required   Requested Specialty: Pain Medicine   Number of Visits Requested: 1     Diet Adult Regular     Order Specific Question Answer Comments   Additional restrictions: Diabetic 2000    Additional restrictions: Low Phosphorus      Activity as tolerated       Significant Diagnostic Studies: Labs: BMP:   Recent Labs   Lab 07/22/24  0324 07/23/24  0348   * 68*    138   K 4.6 4.6    104   CO2 23 27   BUN 57* 29*   CREATININE 6.8* 4.8*   CALCIUM 8.0* 8.4*   MG 2.4 2.2    and CBC   Recent  Labs   Lab 07/22/24  0324 07/23/24  0348   WBC 9.04 9.20   HGB 7.7* 7.8*   HCT 24.0* 24.7*   * 99*       Pending Diagnostic Studies:       Procedure Component Value Units Date/Time    EKG 12-lead [[id number]] Collected: 07/18/24 1541    Order Status: Sent Lab Status: In process Updated: 07/19/24 0844     QRS Duration 80 ms      OHS QTC Calculation 460 ms     Narrative:      Test Reason : R07.9,    Vent. Rate : 092 BPM     Atrial Rate : 092 BPM     P-R Int : 156 ms          QRS Dur : 080 ms      QT Int : 372 ms       P-R-T Axes : 035 -32 060 degrees     QTc Int : 460 ms    Normal sinus rhythm  Left axis deviation  Low voltage QRS  Abnormal ECG  When compared with ECG of 17-JUL-2024 09:52,  No significant change was found    Referred By: AAAREFERR   SELF           Confirmed By:     EKG 12-lead [[id number]] Collected: 07/17/24 0952    Order Status: Sent Lab Status: In process Updated: 07/17/24 1204     QRS Duration 78 ms      OHS QTC Calculation 462 ms     Narrative:      Test Reason : R07.9,    Vent. Rate : 087 BPM     Atrial Rate : 087 BPM     P-R Int : 186 ms          QRS Dur : 078 ms      QT Int : 384 ms       P-R-T Axes : 037 -31 056 degrees     QTc Int : 462 ms    Normal sinus rhythm  Left axis deviation  Possible Anterior infarct (cited on or before 16-JUL-2024)  Abnormal ECG  When compared with ECG of 16-JUL-2024 16:33,  No significant change was found    Referred By: AAAREFERR   SELF           Confirmed By:     Echo [[id number]]     Order Status: Sent Lab Status: No result            Medications:  Reconciled Home Medications:      Medication List        CHANGE how you take these medications      acetaZOLAMIDE 250 MG tablet  Commonly known as: DIAMOX  take 1 tablet by mouth 3 times a day  What changed:   how much to take  how to take this  when to take this     brinzolamide 1 % ophthalmic suspension  Commonly known as: AZOPT  Place1 drop in left eye 3 times a day for 30 days  What changed:   how much to  take  how to take this  when to take this     cetirizine 10 MG tablet  Commonly known as: ZYRTEC  Take 1 tablet every day by oral route.  What changed: when to take this     dorzolamide-timolol 2-0.5% 22.3-6.8 mg/mL ophthalmic solution  Commonly known as: COSOPT  place 1 drop in left eye twice a day  for 30 days  What changed:   how much to take  how to take this  when to take this     FLUoxetine 40 MG capsule  Take 1 capsule every day by oral route.  What changed:   how much to take  how to take this  when to take this  Another medication with the same name was removed. Continue taking this medication, and follow the directions you see here.     fluticasone propionate 50 mcg/actuation nasal spray  Commonly known as: FLONASE ALLERGY RELIEF  Spray 1 spray every day by intranasal route.  What changed:   how much to take  when to take this     gabapentin 300 MG capsule  Commonly known as: NEURONTIN  Take 2 capsules twice a day by oral route for 90 days.  What changed:   how much to take  how to take this  when to take this     hydrALAZINE 25 MG tablet  Commonly known as: APRESOLINE  take 1 tablet by mouth every 8 hours  What changed:   how much to take  how to take this  when to take this     LORazepam 0.5 MG tablet  Commonly known as: ATIVAN  Take 1 tablet 3 times a day by oral route for 7 days, for severe panic.  What changed:   how much to take  how to take this  when to take this     ondansetron 4 MG Tbdl  Commonly known as: ZOFRAN-ODT  Place 1 tablet (4 mg) on or under the tongue every 8 hours for 10 days.  What changed:   how much to take  how to take this  when to take this  reasons to take this     sucralfate 100 mg/mL suspension  Commonly known as: CARAFATE  Take 10 mL 4 times a day by oral route before meals for 30 days.  What changed:   how much to take  how to take this  when to take this     timolol maleate 0.5% 0.5 % Drop  Commonly known as: TIMOPTIC  Place 1 drop in left eye 2 times a day for 30  "days  What changed:   how much to take  how to take this  when to take this     VENTOLIN HFA 90 mcg/actuation inhaler  Generic drug: albuterol  Inhale 2 puffs every 4 hours by inhalation route.  What changed:   how much to take  when to take this  reasons to take this            CONTINUE taking these medications      ALPHAGAN P 0.15 % ophthalmic solution  Generic drug: brimonidine 0.15 % OPTH DROP  Place 1 drop into both eyes 3 (three) times daily.     apixaban 5 mg Tab  Commonly known as: ELIQUIS  Take 1 tablet (5 mg total) by mouth 2 (two) times daily. Patient w/ thrombocytopenia <50, so held during admission, follow-up with hematologist about restarting     atropine 1% 1 % Drop  Commonly known as: ISOPTO ATROPINE  Place 1 drop into the left eye 2 (two) times a day.     BD ULTRA-FINE MINI PEN NEEDLE 31 gauge x 3/16" Ndle  Generic drug: pen needle, diabetic  Use as directed with insulin once daily     busPIRone 10 MG tablet  Commonly known as: BUSPAR  Take 1 tablet (10 mg total) by mouth 3 (three) times daily as needed (anxiety).     DEXCOM G7  Misc  Generic drug: blood-glucose meter,continuous  USE WITH SENSORS     DEXCOM G7 SENSOR Heather  Generic drug: blood-glucose sensor  CHANGE EVERY 10 DAYS     insulin glargine U-100 (Lantus) 100 unit/mL injection  Inject 13 Units into the skin 2 (two) times a day.     * lancets 33 gauge Misc  Use to test twice daily     * TRUEPLUS LANCETS 33 gauge Misc  Generic drug: lancets  Use 1 to check blood glucose three times daily     levETIRAcetam 500 MG Tab  Commonly known as: KEPPRA  Take 1 tablet (500 mg total) by mouth 2 (two) times daily.     oxyCODONE-acetaminophen  mg per tablet  Commonly known as: PERCOCET  Take 1 tablet by mouth every 4 (four) hours as needed for Pain.     pantoprazole 40 MG tablet  Commonly known as: PROTONIX  Take 1 tablet (40 mg total) by mouth once daily.     prednisoLONE acetate 1 % Drps  Commonly known as: PRED FORTE  Place 1 drop into the " left eye 2 (two) times daily.     predniSONE 20 MG tablet  Commonly known as: DELTASONE  take 3 tablets Oral Daily,x30 day(s)     RENVELA 800 mg Tab  Generic drug: sevelamer carbonate  Take 1 tablet (800 mg total) by mouth 3 (three) times daily with meals.     TRUE METRIX GLUCOSE METER Misc  Generic drug: blood-glucose meter  Use to check blood glucose     TRUE METRIX GLUCOSE TEST STRIP Strp  Generic drug: blood sugar diagnostic  Use 1 strip to check blood glucose three times daily           * This list has 2 medication(s) that are the same as other medications prescribed for you. Read the directions carefully, and ask your doctor or other care provider to review them with you.                STOP taking these medications      amLODIPine 5 MG tablet  Commonly known as: NORVASC     carvediloL 25 MG tablet  Commonly known as: COREG     isosorbide mononitrate 30 MG 24 hr tablet  Commonly known as: IMDUR     LEVEMIR FLEXPEN 100 unit/mL (3 mL) Inpn pen  Generic drug: insulin detemir U-100 (Levemir)     tobramycin-dexAMETHasone 0.3-0.1% 0.3-0.1 % Drps  Commonly known as: Tobradex              Indwelling Lines/Drains at time of discharge:   Lines/Drains/Airways       Drain  Duration                  Hemodialysis AV Fistula Left upper arm -- days                    Time spent on the discharge of patient: 21 minutes        Jason Peres MD  Department of Hospital Medicine  [facility]

## 2024-07-23 NOTE — PLAN OF CARE
Problem: Adult Inpatient Plan of Care  Goal: Absence of Hospital-Acquired Illness or Injury  Outcome: Met  Goal: Optimal Comfort and Wellbeing  Outcome: Met  Goal: Readiness for Transition of Care  Outcome: Met

## 2024-07-23 NOTE — PLAN OF CARE
Patient cleared for discharge from case management standpoint.    Office to contact patient to scheduled hospital follow up.       07/23/24 1238   Final Note   Assessment Type Final Discharge Note   Anticipated Discharge Disposition Home   What phone number can be called within the next 1-3 days to see how you are doing after discharge? [phone]   Hospital Resources/Appts/Education Provided Provided patient/caregiver with written discharge plan information;Provided education on problems/symptoms using teachback;Appointment suggestion unavailable   Post-Acute Status   Post-Acute Authorization Other   Other Status No Post-Acute Service Needs   Discharge Delays None known at this time

## 2024-07-23 NOTE — CARE UPDATE
07/22/24 2013   Patient Assessment/Suction   Level of Consciousness (AVPU) alert   Respiratory Effort Unlabored   Expansion/Accessory Muscles/Retractions expansion symmetric   All Lung Fields Breath Sounds clear   Rhythm/Pattern, Respiratory depth regular;pattern regular   Cough Frequency infrequent   Cough Type good;nonproductive   PRE-TX-O2   Device (Oxygen Therapy) nasal cannula   $ Is the patient on Low Flow Oxygen? Yes   Flow (L/min) (Oxygen Therapy) 3   SpO2 100 %   Pulse Oximetry Type Continuous   $ Pulse Oximetry - Multiple Charge Pulse Oximetry - Multiple   Pulse 84   Education   $ Education Oxygen;15 min

## 2024-07-23 NOTE — NURSING
At D/C, Tabby Howard is AA & O x 3, her skin is warm and dry, follow up care discussed at length with patient/family to include medications and to follow-up with MD; patient/family given discharge instructions along with prescriptions, as indicated, and care sheets.Pt was brought to [facility] to meet father who will bring pt home.

## 2024-07-23 NOTE — PROGRESS NOTES
INPATIENT NEPHROLOGY Progress Note  [facility]    Patient Name: Tabby Howard  Date: 07/23/2024    Reason for consultation: ESRD    Chief Complaint:   Chief Complaint   Patient presents with    Abdominal Pain     Patient was at dialysis and complained of lower back pain, did not finish  only took 3 kg at dialysis.  Has missed about 8 appointments in the last 6 weeks of dialysis. CBG was 58, D10 given.  SpO2 did drop from 97 to 79% on room.  Patient is on 3 liters.         History of Present Illness:  This is a 34-year-old female with end-stage kidney disease and on dialysis presented to the hospital after not feeling well during dialysis. In the middle of dialysis the patient had abdominal discomfort and complained of diarrhea. Dialysis was stopped and the patient was sent to the hospital. She got 2L off. She was 12L above dry weight. Workup included CT of the abdomen and pelvis which resulted in diffuse body wall edema, mild circumferential wall thickening of the distal descending colon, sigmoid colon and rectum. There was also circumferential bladder wall thickening. This process could correlate with infectious/inflammatory process. Patient was given antibiotics empirically in the ER. Patient has chronic pain and complaints of abdominal pain. No major fever or chills. Consulted for dialysis.    Interval History:  7/17- IUF today, HD/UF MWF  7/18  had IUF only yesterday, K+ a little elevated, will run regular tx today.  Pt starting to feel better w/fluid off.  Hgb dropping, ordered epo w/HD on MWF.  7/19  Patient seen on hemodialysis for uremic clearance and ultrafiltration.    7/20 low BP this AM- holding all BP meds- repeating echo- spoke with Dr. Tompkins and pericardial effusion small on last echo- Dr. Smith will see patient today  7/21 labs showed DKA yest- transferred to ICU for insulin gtt- BP all over the place, on 2L NC, repeat echo shows moderate circumferential effusion-  echolucent-   no indication of cardiac tamponade, volume status improved  7/22  AFVSS, no acute events   7/23  tolerated 4 liter net uf yesterday.   Intermittent hypotension.  AFEBRILE    Plan of Care:    Assessment:  ESRD on HD MWF  Pericardial effusion- uremia, volume overload; HTN/D CHF  DKA  SHPT  Anemia of CKD  Thrombocytopenia    Plan:    - routine HD MWF  - echo reviewed- continue UF with HD- renal diet, 1.5L fluid restriction- continue hydralazine- eventually resume coreg and norvasc  - DKA resolved  - continue binder with meals  - transfused this admission- continue SHELLEY with HD  - hold heparin with HD  -Ferritin too high for iv iron      Thank you for allowing us to participate in this patient's care. We will continue to follow.    Vital Signs:  Temp Readings from Last 3 Encounters:   07/23/24 97.6 °F (36.4 °C) (Oral)   06/23/24 98.2 °F (36.8 °C) (Oral)   05/13/24 98.3 °F (36.8 °C) (Oral)       Pulse Readings from Last 3 Encounters:   07/23/24 83   06/23/24 90   05/13/24 75       BP Readings from Last 3 Encounters:   07/23/24 (!) 101/46   06/23/24 (!) 169/85   05/13/24 (!) 149/79       Weight:  Wt Readings from Last 3 Encounters:   07/16/24 65 kg (143 lb 4.8 oz)   07/22/24 65.9 kg (145 lb 4.5 oz)   07/20/24 65.9 kg (145 lb 4.5 oz)       Medications:  Scheduled Meds:   apixaban  5 mg Oral BID    epoetin teresa-epbx  10,000 Units Intravenous Every Mon, Wed, Fri    FLUoxetine  20 mg Oral Daily    fluticasone propionate  1 spray Each Nostril Daily    gabapentin  200 mg Oral BID    hydrALAZINE  25 mg Oral Q8H    insulin glargine U-100  15 Units Subcutaneous QHS    levETIRAcetam  500 mg Oral BID    mupirocin   Nasal BID    pantoprazole  40 mg Oral Before breakfast    sevelamer carbonate  800 mg Oral TID WM     Continuous Infusions:      PRN Meds:.  Current Facility-Administered Medications:     acetaminophen, 650 mg, Oral, Q4H PRN    albuterol sulfate, 2.5 mg, Nebulization, Q4H PRN    aluminum-magnesium hydroxide-simethicone, 30  mL, Oral, QID PRN    dextrose 50%, 12.5 g, Intravenous, PRN    dextrose 50%, 25 g, Intravenous, PRN    diphenhydrAMINE, 25 mg, Oral, Q6H PRN    glucagon (human recombinant), 1 mg, Intramuscular, PRN    glucose, 16 g, Oral, PRN    glucose, 24 g, Oral, PRN    HYDROcodone-acetaminophen, 1 tablet, Oral, Q4H PRN    insulin aspart U-100, 0-5 Units, Subcutaneous, QID (AC + HS) PRN    melatonin, 6 mg, Oral, Nightly PRN    morphine, 2 mg, Intravenous, Q4H PRN    naloxone, 0.02 mg, Intravenous, PRN    nitroGLYCERIN, 0.4 mg, Sublingual, Q5 Min PRN    ondansetron, 4 mg, Intravenous, Q6H PRN    sodium chloride 0.9%, 10 mL, Intravenous, PRN  No current facility-administered medications on file prior to encounter.     Current Outpatient Medications on File Prior to Encounter   Medication Sig Dispense Refill    acetaZOLAMIDE (DIAMOX) 250 MG tablet take 1 tablet by mouth 3 times a day (Patient taking differently: Take 250 mg by mouth 3 (three) times daily.) 90 tablet 3    albuterol (VENTOLIN HFA) 90 mcg/actuation inhaler Inhale 2 puffs every 4 hours by inhalation route. (Patient taking differently: Inhale 2 puffs into the lungs every 4 (four) hours as needed for Shortness of Breath or Wheezing.) 8 g 2    amLODIPine (NORVASC) 5 MG tablet Take 1 tablet (5 mg total) by mouth once daily. 30 tablet 11    apixaban (ELIQUIS) 5 mg Tab Take 1 tablet (5 mg total) by mouth 2 (two) times daily. Patient w/ thrombocytopenia <50, so held during admission, follow-up with hematologist about restarting 180 tablet 1    atropine 1% (ISOPTO ATROPINE) 1 % Drop Place 1 drop into the left eye 2 (two) times a day. 15 mL 3    blood sugar diagnostic (TRUE METRIX GLUCOSE TEST STRIP) Strp Use 1 strip to check blood glucose three times daily 100 each 11    blood-glucose meter (TRUE METRIX GLUCOSE METER) Misc Use to check blood glucose 1 each 0    blood-glucose meter,continuous (DEXCOM ) Misc USE WITH SENSORS 1 each 0    blood-glucose sensor Heather CHANGE  EVERY 10 DAYS 3 each 0    brimonidine 0.15 % OPTH DROP (ALPHAGAN) 0.15 % ophthalmic solution Place 1 drop into both eyes 3 (three) times daily. 15 mL 3    brinzolamide (AZOPT) 1 % ophthalmic suspension Place1 drop in left eye 3 times a day for 30 days (Patient taking differently: Place 1 drop into both eyes 3 (three) times daily.) 15 mL 6    busPIRone (BUSPAR) 10 MG tablet Take 1 tablet (10 mg total) by mouth 3 (three) times daily as needed (anxiety). 60 tablet 5    carvediloL (COREG) 25 MG tablet take 1 tablet Oral 2 times daily (Patient taking differently: Take 25 mg by mouth 2 (two) times daily.) 60 tablet 0    cetirizine (ZYRTEC) 10 MG tablet Take 1 tablet every day by oral route. (Patient taking differently: Take 10 mg by mouth once daily.) 30 tablet 0    dorzolamide-timolol 2-0.5% (COSOPT) 22.3-6.8 mg/mL ophthalmic solution place 1 drop in left eye twice a day  for 30 days (Patient taking differently: Place 1 drop into both eyes 2 (two) times daily.) 10 mL 0    FLUoxetine 20 MG capsule Take 1 capsule every day by oral route for 90 days. (Patient taking differently: Take 20 mg by mouth once daily.) 90 capsule 1    FLUoxetine 40 MG capsule Take 1 capsule every day by oral route. (Patient taking differently: Take 40 mg by mouth once daily.) 30 capsule 2    fluticasone propionate (FLONASE ALLERGY RELIEF) 50 mcg/actuation nasal spray Bivalve 1 spray every day by intranasal route. (Patient taking differently: 1 spray by Each Nostril route once daily.) 16 g 2    gabapentin (NEURONTIN) 300 MG capsule Take 2 capsules twice a day by oral route for 90 days. (Patient taking differently: Take 600 mg by mouth 2 (two) times daily.) 360 capsule 1    hydrALAZINE (APRESOLINE) 25 MG tablet take 1 tablet by mouth every 8 hours (Patient taking differently: Take 25 mg by mouth every 8 (eight) hours.) 90 tablet 0    insulin detemir U-100, Levemir, (LEVEMIR FLEXTOUCH U100 INSULIN) 100 unit/mL (3 mL) InPn pen Inject 22 Units into the  "skin every evening. Patient not needing insulin in-patient. Follow-up with PCP for hospital follow-up and restarting the medication. Or restart medication if glucose >200 consistently resume home insulin regimen. Or if glucose is persistently less than 100, decrease by 5 units until following up with PCP 15 mL 1    insulin glargine U-100, Lantus, 100 unit/mL injection inject 13 unit subcutaneously 2 times daily (Patient taking differently: Inject 13 Units into the skin 2 (two) times a day.) 10 mL 0    isosorbide mononitrate (IMDUR) 30 MG 24 hr tablet Take 1 tablet (30 mg total) by mouth once daily. 90 tablet 1    lancets (TRUEPLUS LANCETS) 33 gauge Misc Use 1 to check blood glucose three times daily 100 each 11    lancets 33 gauge Misc Use to test twice daily 100 each 5    levETIRAcetam (KEPPRA) 500 MG Tab Take 1 tablet (500 mg total) by mouth 2 (two) times daily. 60 tablet 11    LORazepam (ATIVAN) 0.5 MG tablet Take 1 tablet 3 times a day by oral route for 7 days, for severe panic. (Patient taking differently: Take 0.5 mg by mouth 3 (three) times daily.) 21 tablet 2    ondansetron (ZOFRAN-ODT) 4 MG TbDL Place 1 tablet (4 mg) on or under the tongue every 8 hours for 10 days. (Patient taking differently: Take 8 mg by mouth every 8 (eight) hours as needed.) 24 tablet 2    oxyCODONE-acetaminophen (PERCOCET)  mg per tablet Take 1 tablet by mouth every 4 (four) hours as needed for Pain. 42 tablet 0    pantoprazole (PROTONIX) 40 MG tablet Take 1 tablet (40 mg total) by mouth once daily. 30 tablet 0    pen needle, diabetic 31 gauge x 3/16" Ndle Use as directed with insulin once daily 100 each 0    prednisoLONE acetate (PRED FORTE) 1 % DrpS Place 1 drop into the left eye 2 (two) times daily. 5 mL 3    predniSONE (DELTASONE) 20 MG tablet take 3 tablets Oral Daily,x30 day(s) (Patient taking differently: Take 20 mg by mouth once daily.) 90 tablet 0    sevelamer carbonate (RENVELA) 800 mg Tab Take 1 tablet (800 mg total) " by mouth 3 (three) times daily with meals. 180 tablet 11    sucralfate (CARAFATE) 100 mg/mL suspension Take 10 mL 4 times a day by oral route before meals for 30 days. (Patient taking differently: Take 10 mLs by mouth 4 (four) times daily with meals and nightly.) 1200 mL 1    timolol maleate 0.5% (TIMOPTIC) 0.5 % Drop Place 1 drop in left eye 2 times a day for 30 days (Patient taking differently: Place 1 drop into both eyes 2 (two) times daily.) 5 mL 6    tobramycin-dexAMETHasone 0.3-0.1% (TOBRADEX) 0.3-0.1 % DrpS 1 drop Eye-Left every 6 hours for 5 day(s) 5 mL 0    [DISCONTINUED] atenoloL (TENORMIN) 50 MG tablet Take 1 tablet (50 mg total) by mouth every other day. 45 tablet 3    [DISCONTINUED] omeprazole (PRILOSEC) 20 MG capsule Take 2 capsules (40 mg total) by mouth once daily. for 10 days 20 capsule 0       Review of Systems:  neg    Physical Exam:  Constitutional: nad, aao x 3  Heart: rrr, no m/r/g, wwp, + edema  Lungs: ctab, no w/r/r/c, no lb  Abdomen: s/nt/d, +BS      Results:  Lab Results   Component Value Date     07/23/2024    K 4.6 07/23/2024     07/23/2024    CO2 27 07/23/2024    BUN 29 (H) 07/23/2024    CREATININE 4.8 (H) 07/23/2024    CALCIUM 8.4 (L) 07/23/2024    ANIONGAP 7 (L) 07/23/2024    ESTGFRAFRICA 19 (L) 09/03/2022    EGFRNONAA 18 (A) 07/31/2022       Lab Results   Component Value Date    CALCIUM 8.4 (L) 07/23/2024    PHOS 7.5 (H) 06/22/2024       Recent Labs   Lab 07/23/24  0348   WBC 9.20   RBC 2.61*   HGB 7.8*   HCT 24.7*   PLT 99*   MCV 95   MCH 29.9   MCHC 31.6*     Patient care time was spent personally by me on the following activities: > 35 minutes  Obtaining a history.  Examination of patient.  Providing medical care at the patients bedside.  Developing a treatment plan with patient or surrogate and bedside caregivers.  Ordering and reviewing laboratory studies, radiographic studies, pulse oximetry.  Ordering and performing treatments and interventions.  Evaluation of  patient's response to treatment.  Discussions with consultants while on the unit and immediately available to the patient.  Re-evaluation of the patient's condition.  Documentation in the medical record.      Hugh Figueroa MD    [facility] [address] [address]  [phone] (p)  [phone] (f)

## 2024-07-29 ENCOUNTER — HOSPITAL ENCOUNTER (EMERGENCY)
Facility: HOSPITAL | Age: 34
Discharge: HOME | End: 2024-07-29
Attending: EMERGENCY MEDICINE
Payer: MEDICAID

## 2024-07-29 VITALS
RESPIRATION RATE: 16 BRPM | DIASTOLIC BLOOD PRESSURE: 74 MMHG | SYSTOLIC BLOOD PRESSURE: 144 MMHG | TEMPERATURE: 98 F | OXYGEN SATURATION: 96 % | HEART RATE: 82 BPM | HEIGHT: 62 IN | BODY MASS INDEX: 21.16 KG/M2 | WEIGHT: 115 LBS

## 2024-07-29 DIAGNOSIS — K31.84 GASTROPARESIS: Primary | ICD-10-CM

## 2024-07-29 LAB
ALBUMIN SERPL BCP-MCNC: 3.8 G/DL (ref 3.5–5.2)
ALP SERPL-CCNC: 126 U/L (ref 55–135)
ALT SERPL W/O P-5'-P-CCNC: 24 U/L (ref 10–44)
ANION GAP SERPL CALC-SCNC: 17 MMOL/L (ref 8–16)
AST SERPL-CCNC: 23 U/L (ref 10–40)
BASOPHILS # BLD AUTO: 0.02 K/UL (ref 0–0.2)
BASOPHILS NFR BLD: 0.3 % (ref 0–1.9)
BILIRUB SERPL-MCNC: 1.7 MG/DL (ref 0.1–1)
BUN SERPL-MCNC: 29 MG/DL (ref 6–20)
CALCIUM SERPL-MCNC: 9.5 MG/DL (ref 8.7–10.5)
CHLORIDE SERPL-SCNC: 102 MMOL/L (ref 95–110)
CO2 SERPL-SCNC: 27 MMOL/L (ref 23–29)
CREAT SERPL-MCNC: 7.1 MG/DL (ref 0.5–1.4)
DIFFERENTIAL METHOD BLD: ABNORMAL
EOSINOPHIL # BLD AUTO: 0 K/UL (ref 0–0.5)
EOSINOPHIL NFR BLD: 0.4 % (ref 0–8)
ERYTHROCYTE [DISTWIDTH] IN BLOOD BY AUTOMATED COUNT: 20.5 % (ref 11.5–14.5)
EST. GFR  (NO RACE VARIABLE): 7.2 ML/MIN/1.73 M^2
GLUCOSE SERPL-MCNC: 83 MG/DL (ref 70–110)
HCT VFR BLD AUTO: 26.3 % (ref 37–48.5)
HGB BLD-MCNC: 8.5 G/DL (ref 12–16)
HIV 1+2 AB+HIV1 P24 AG SERPL QL IA: NORMAL
IMM GRANULOCYTES # BLD AUTO: 0.02 K/UL (ref 0–0.04)
IMM GRANULOCYTES NFR BLD AUTO: 0.3 % (ref 0–0.5)
LACTATE SERPL-SCNC: 0.6 MMOL/L (ref 0.5–2.2)
LACTATE SERPL-SCNC: 3.3 MMOL/L (ref 0.5–2.2)
LIPASE SERPL-CCNC: 26 U/L (ref 4–60)
LYMPHOCYTES # BLD AUTO: 0.5 K/UL (ref 1–4.8)
LYMPHOCYTES NFR BLD: 6.3 % (ref 18–48)
MCH RBC QN AUTO: 29.8 PG (ref 27–31)
MCHC RBC AUTO-ENTMCNC: 32.3 G/DL (ref 32–36)
MCV RBC AUTO: 92 FL (ref 82–98)
MONOCYTES # BLD AUTO: 0.2 K/UL (ref 0.3–1)
MONOCYTES NFR BLD: 3 % (ref 4–15)
NEUTROPHILS # BLD AUTO: 6.7 K/UL (ref 1.8–7.7)
NEUTROPHILS NFR BLD: 89.7 % (ref 38–73)
NRBC BLD-RTO: 0 /100 WBC
PLATELET # BLD AUTO: 121 K/UL (ref 150–450)
PMV BLD AUTO: 10.1 FL (ref 9.2–12.9)
POTASSIUM SERPL-SCNC: 3.8 MMOL/L (ref 3.5–5.1)
PROT SERPL-MCNC: 7.7 G/DL (ref 6–8.4)
RBC # BLD AUTO: 2.85 M/UL (ref 4–5.4)
SODIUM SERPL-SCNC: 146 MMOL/L (ref 136–145)
WBC # BLD AUTO: 7.45 K/UL (ref 3.9–12.7)

## 2024-07-29 PROCEDURE — 27000221 HC OXYGEN, UP TO 24 HOURS

## 2024-07-29 PROCEDURE — 99284 EMERGENCY DEPT VISIT MOD MDM: CPT | Mod: 25

## 2024-07-29 PROCEDURE — 63600175 PHARM REV CODE 636 W HCPCS: Performed by: EMERGENCY MEDICINE

## 2024-07-29 PROCEDURE — 99900035 HC TECH TIME PER 15 MIN (STAT)

## 2024-07-29 PROCEDURE — 96375 TX/PRO/DX INJ NEW DRUG ADDON: CPT

## 2024-07-29 PROCEDURE — 94761 N-INVAS EAR/PLS OXIMETRY MLT: CPT

## 2024-07-29 PROCEDURE — 25000003 PHARM REV CODE 250: Performed by: EMERGENCY MEDICINE

## 2024-07-29 PROCEDURE — 83690 ASSAY OF LIPASE: CPT | Performed by: EMERGENCY MEDICINE

## 2024-07-29 PROCEDURE — 96374 THER/PROPH/DIAG INJ IV PUSH: CPT

## 2024-07-29 PROCEDURE — 87389 HIV-1 AG W/HIV-1&-2 AB AG IA: CPT | Performed by: EMERGENCY MEDICINE

## 2024-07-29 PROCEDURE — 96361 HYDRATE IV INFUSION ADD-ON: CPT

## 2024-07-29 PROCEDURE — 99900031 HC PATIENT EDUCATION (STAT)

## 2024-07-29 PROCEDURE — 80053 COMPREHEN METABOLIC PANEL: CPT | Performed by: EMERGENCY MEDICINE

## 2024-07-29 PROCEDURE — 83605 ASSAY OF LACTIC ACID: CPT | Performed by: EMERGENCY MEDICINE

## 2024-07-29 PROCEDURE — 85025 COMPLETE CBC W/AUTO DIFF WBC: CPT | Performed by: EMERGENCY MEDICINE

## 2024-07-29 RX ORDER — HYDROMORPHONE HYDROCHLORIDE 1 MG/ML
1 INJECTION, SOLUTION INTRAMUSCULAR; INTRAVENOUS; SUBCUTANEOUS
Status: COMPLETED | OUTPATIENT
Start: 2024-07-29 | End: 2024-07-29

## 2024-07-29 RX ORDER — PROCHLORPERAZINE EDISYLATE 5 MG/ML
10 INJECTION INTRAMUSCULAR; INTRAVENOUS
Status: COMPLETED | OUTPATIENT
Start: 2024-07-29 | End: 2024-07-29

## 2024-07-29 RX ORDER — SUCRALFATE 1 G/1
1 TABLET ORAL 4 TIMES DAILY
Qty: 40 TABLET | Refills: 0 | Status: SHIPPED | OUTPATIENT
Start: 2024-07-29 | End: 2024-08-09

## 2024-07-29 RX ADMIN — SODIUM CHLORIDE 500 ML: 9 INJECTION, SOLUTION INTRAVENOUS at 01:07

## 2024-07-29 RX ADMIN — HYDROMORPHONE HYDROCHLORIDE 1 MG: 1 INJECTION, SOLUTION INTRAMUSCULAR; INTRAVENOUS; SUBCUTANEOUS at 10:07

## 2024-07-29 RX ADMIN — PROCHLORPERAZINE EDISYLATE 10 MG: 5 INJECTION INTRAMUSCULAR; INTRAVENOUS at 10:07

## 2024-07-29 NOTE — ED NOTES
Care assumed of pt. Pt brought in by EMS from home for c/o abd pain, N/V that started this morning. Pt moaning, tearful and slithering around in bed; unable to sit still. Hx ESRD/HD, receives dialysis Tues/Thurs/Sat, reports compliance. Fistula to KELLEE l. Denies fever/chills. Pt updated on plan of care, verbalized understanding.

## 2024-07-29 NOTE — DISCHARGE INSTRUCTIONS
Continue all of your previous sleep prescribed medications drink lots of water, return here as needed or if worse in any way.

## 2024-07-29 NOTE — ED PROVIDER NOTES
Encounter Date: 2024       History     Chief Complaint   Patient presents with    Abdominal Pain     Patient states left sided abdominal, vomiting, and back pain that started this morning.       34-year-old female, history of end-stage renal disease on dialysis, with dialysis Tuesday, Thursday, and Saturday, as well as gastroparesis, and heart failure, diabetes mellitus, and hypertension, is here from home via EMS complaining of severe abdominal pain, mostly on the left side, with the nausea and vomiting.  Symptoms started this morning.  She states that none of her medications are helping her.      Review of patient's allergies indicates:   Allergen Reactions    Droperidol Hives    Penicillins Hives     Past Medical History:   Diagnosis Date    ESRD on hemodialysis 2022    Gastritis 2022    EGD was 21    Gastroparesis 2022    has not had Emptying study    Heart failure with preserved ejection fraction 2022    EF 55% on 3/22    History of supraventricular tachycardia     Hyperkalemia 2022    Hypertensive emergency 2022    Sickle cell trait 2022    Type 2 diabetes mellitus      Past Surgical History:   Procedure Laterality Date     SECTION      x 3    COLONOSCOPY      COLONOSCOPY N/A 2022    Procedure: COLONOSCOPY;  Surgeon: Jagdeep Cedeno MD;  Location: [facility];  Service: Endoscopy;  Laterality: N/A;    DESTRUCTION, CILIARY BODY, USING LASER Left 3/8/2024    Procedure: Cyclophotocoagulation G-probe, retrobulbar chlorpromazine left eye;  Surgeon: Fidel Wise MD;  Location: [address];  Service: Ophthalmology;  Laterality: Left;    ENDOSCOPIC ULTRASOUND OF UPPER GASTROINTESTINAL TRACT N/A 2023    Procedure: ULTRASOUND, UPPER GI TRACT, ENDOSCOPIC;  Surgeon: Micky Paredes III, MD;  Location: [facility] ENDO;  Service: Endoscopy;  Laterality: N/A;    ESOPHAGOGASTRODUODENOSCOPY N/A 10/18/2019    Procedure: ESOPHAGOGASTRODUODENOSCOPY (EGD);   Surgeon: Gianluca Mendez MD;  Location: [facility] ENDO;  Service: Endoscopy;  Laterality: N/A;    ESOPHAGOGASTRODUODENOSCOPY N/A 08/24/2022    Procedure: EGD (ESOPHAGOGASTRODUODENOSCOPY);  Surgeon: Micky Paredes III, MD;  Location: [facility] ENDO;  Service: Endoscopy;  Laterality: N/A;    ESOPHAGOGASTRODUODENOSCOPY N/A 12/5/2022    Procedure: EGD (ESOPHAGOGASTRODUODENOSCOPY);  Surgeon: Marcelo Zhong MD;  Location: [facility] ENDO;  Service: Endoscopy;  Laterality: N/A;    INSERTION, CATHETER, TUNNELED N/A 6/17/2023    Procedure: Insertion,catheter,tunneled;  Surgeon: Carlos Thurman Jr., MD;  Location: [facility] OR;  Service: General;  Laterality: N/A;    LAPAROSCOPIC CHOLECYSTECTOMY N/A 07/30/2022    Procedure: CHOLECYSTECTOMY, LAPAROSCOPIC;  Surgeon: Grey Perez MD;  Location: [facility] OR;  Service: General;  Laterality: N/A;    PLACEMENT OF DUAL-LUMEN VASCULAR CATHETER Left 07/12/2022    Procedure: INSERTION-CATHETER-JOSEPH;  Surgeon: Dionte Gan MD;  Location: [facility] OR;  Service: General;  Laterality: Left;    PLACEMENT OF DUAL-LUMEN VASCULAR CATHETER Right 07/26/2022    Procedure: INSERTION-CATHETER-Hemosplit;  Surgeon: Dionte Gan MD;  Location: [facility] OR;  Service: General;  Laterality: Right;     Family History   Problem Relation Name Age of Onset    Diabetes Mother      Diabetes Father       Social History     Tobacco Use    Smoking status: Never    Smokeless tobacco: Never   Substance Use Topics    Alcohol use: Not Currently    Drug use: No     Review of Systems   Constitutional: Negative.    HENT: Negative.     Eyes: Negative.    Respiratory: Negative.     Cardiovascular: Negative.    Gastrointestinal:  Positive for abdominal pain, nausea and vomiting. Negative for constipation and diarrhea.   Genitourinary:  Negative for pelvic pain and vaginal discharge.       Physical Exam     Initial Vitals [07/29/24 0945]   BP Pulse Resp Temp SpO2   (!) 187/90 98 (!) 22 97.8 °F (36.6 °C) 95 %      MAP       --          Physical Exam    ED Course   Procedures  Labs Reviewed   CBC W/ AUTO DIFFERENTIAL - Abnormal       Result Value    WBC 7.45      RBC 2.85 (*)     Hemoglobin 8.5 (*)     Hematocrit 26.3 (*)     MCV 92      MCH 29.8      MCHC 32.3      RDW 20.5 (*)     Platelets 121 (*)     MPV 10.1      Immature Granulocytes 0.3      Gran # (ANC) 6.7      Immature Grans (Abs) 0.02      Lymph # 0.5 (*)     Mono # 0.2 (*)     Eos # 0.0      Baso # 0.02      nRBC 0      Gran % 89.7 (*)     Lymph % 6.3 (*)     Mono % 3.0 (*)     Eosinophil % 0.4      Basophil % 0.3      Differential Method Automated     COMPREHENSIVE METABOLIC PANEL - Abnormal    Sodium 146 (*)     Potassium 3.8      Chloride 102      CO2 27      Glucose 83      BUN 29 (*)     Creatinine 7.1 (*)     Calcium 9.5      Total Protein 7.7      Albumin 3.8      Total Bilirubin 1.7 (*)     Alkaline Phosphatase 126      AST 23      ALT 24      eGFR 7.2 (*)     Anion Gap 17 (*)    LACTIC ACID, PLASMA - Abnormal    Lactate (Lactic Acid) 3.3 (*)    HIV 1 / 2 ANTIBODY    HIV 1/2 Ag/Ab Non-reactive      Narrative:     Release to patient->Immediate   LIPASE    Lipase 26     LACTIC ACID, PLASMA    Lactate (Lactic Acid) 0.6            Imaging Results    None          Medications   prochlorperazine injection Soln 10 mg (10 mg Intravenous Given 7/29/24 1027)   HYDROmorphone injection 1 mg (1 mg Intravenous Given 7/29/24 1027)   sodium chloride 0.9% bolus 500 mL 500 mL (0 mLs Intravenous Stopped 7/29/24 1405)     Medical Decision Making  Amount and/or Complexity of Data Reviewed  Labs: ordered.    Risk  Prescription drug management.                                      Clinical Impression:  Final diagnoses:  [K31.84] Gastroparesis (Primary)          ED Disposition Condition    Discharge Stable          ED Prescriptions       Medication Sig Dispense Start Date End Date Auth. Provider    sucralfate (CARAFATE) 1 gram tablet Take 1 tablet (1 g total) by mouth 4 (four) times daily. for 10  days 40 tablet 7/29/2024 8/8/2024 Ad Berman MD          Follow-up Information       Follow up With Specialties Details Why Contact Info    Melony Kim, NP Family Medicine Call in 1 day  [address] [facility] [address]  [phone]      [facility] Emergency Dept Emergency Medicine  As needed, If symptoms worsen [address] [facility] [address]  [phone]             Ad Berman MD  07/30/24 0641

## 2024-08-05 ENCOUNTER — HOSPITAL ENCOUNTER (EMERGENCY)
Facility: HOSPITAL | Age: 34
Discharge: HOME | End: 2024-08-05
Attending: EMERGENCY MEDICINE
Payer: MEDICAID

## 2024-08-05 VITALS
HEIGHT: 62 IN | SYSTOLIC BLOOD PRESSURE: 147 MMHG | OXYGEN SATURATION: 97 % | HEART RATE: 96 BPM | RESPIRATION RATE: 18 BRPM | TEMPERATURE: 98 F | DIASTOLIC BLOOD PRESSURE: 82 MMHG | WEIGHT: 117 LBS | BODY MASS INDEX: 21.53 KG/M2

## 2024-08-05 DIAGNOSIS — K29.70 GASTRITIS, PRESENCE OF BLEEDING UNSPECIFIED, UNSPECIFIED CHRONICITY, UNSPECIFIED GASTRITIS TYPE: ICD-10-CM

## 2024-08-05 DIAGNOSIS — R10.9 ABDOMINAL PAIN, UNSPECIFIED ABDOMINAL LOCATION: Primary | ICD-10-CM

## 2024-08-05 LAB
ALBUMIN SERPL BCP-MCNC: 4.3 G/DL (ref 3.5–5.2)
ALP SERPL-CCNC: 105 U/L (ref 55–135)
ALT SERPL W/O P-5'-P-CCNC: 35 U/L (ref 10–44)
ANION GAP SERPL CALC-SCNC: 17 MMOL/L (ref 8–16)
AST SERPL-CCNC: 38 U/L (ref 10–40)
BASOPHILS # BLD AUTO: 0.01 K/UL (ref 0–0.2)
BASOPHILS NFR BLD: 0.1 % (ref 0–1.9)
BILIRUB SERPL-MCNC: 1.7 MG/DL (ref 0.1–1)
BUN SERPL-MCNC: 39 MG/DL (ref 6–20)
CALCIUM SERPL-MCNC: 9.2 MG/DL (ref 8.7–10.5)
CHLORIDE SERPL-SCNC: 101 MMOL/L (ref 95–110)
CO2 SERPL-SCNC: 22 MMOL/L (ref 23–29)
CREAT SERPL-MCNC: 6 MG/DL (ref 0.5–1.4)
DIFFERENTIAL METHOD BLD: ABNORMAL
EOSINOPHIL # BLD AUTO: 0 K/UL (ref 0–0.5)
EOSINOPHIL NFR BLD: 0.1 % (ref 0–8)
ERYTHROCYTE [DISTWIDTH] IN BLOOD BY AUTOMATED COUNT: 20.3 % (ref 11.5–14.5)
EST. GFR  (NO RACE VARIABLE): 8.8 ML/MIN/1.73 M^2
GLUCOSE SERPL-MCNC: 144 MG/DL (ref 70–110)
HCT VFR BLD AUTO: 28.4 % (ref 37–48.5)
HGB BLD-MCNC: 9.5 G/DL (ref 12–16)
IMM GRANULOCYTES # BLD AUTO: 0.1 K/UL (ref 0–0.04)
IMM GRANULOCYTES NFR BLD AUTO: 1.2 % (ref 0–0.5)
LYMPHOCYTES # BLD AUTO: 0.5 K/UL (ref 1–4.8)
LYMPHOCYTES NFR BLD: 5.5 % (ref 18–48)
MCH RBC QN AUTO: 30.4 PG (ref 27–31)
MCHC RBC AUTO-ENTMCNC: 33.5 G/DL (ref 32–36)
MCV RBC AUTO: 91 FL (ref 82–98)
MONOCYTES # BLD AUTO: 0.3 K/UL (ref 0.3–1)
MONOCYTES NFR BLD: 3 % (ref 4–15)
NEUTROPHILS # BLD AUTO: 7.4 K/UL (ref 1.8–7.7)
NEUTROPHILS NFR BLD: 90.1 % (ref 38–73)
NRBC BLD-RTO: 0 /100 WBC
PLATELET # BLD AUTO: 164 K/UL (ref 150–450)
PMV BLD AUTO: 11.2 FL (ref 9.2–12.9)
POTASSIUM SERPL-SCNC: 4.5 MMOL/L (ref 3.5–5.1)
PROT SERPL-MCNC: 7 G/DL (ref 6–8.4)
RBC # BLD AUTO: 3.12 M/UL (ref 4–5.4)
SODIUM SERPL-SCNC: 140 MMOL/L (ref 136–145)
WBC # BLD AUTO: 8.24 K/UL (ref 3.9–12.7)

## 2024-08-05 PROCEDURE — 96375 TX/PRO/DX INJ NEW DRUG ADDON: CPT

## 2024-08-05 PROCEDURE — 80053 COMPREHEN METABOLIC PANEL: CPT

## 2024-08-05 PROCEDURE — 96374 THER/PROPH/DIAG INJ IV PUSH: CPT

## 2024-08-05 PROCEDURE — 85025 COMPLETE CBC W/AUTO DIFF WBC: CPT

## 2024-08-05 PROCEDURE — 63600175 PHARM REV CODE 636 W HCPCS: Performed by: EMERGENCY MEDICINE

## 2024-08-05 PROCEDURE — 99284 EMERGENCY DEPT VISIT MOD MDM: CPT | Mod: 25

## 2024-08-05 RX ORDER — ONDANSETRON HYDROCHLORIDE 2 MG/ML
8 INJECTION, SOLUTION INTRAVENOUS
Status: COMPLETED | OUTPATIENT
Start: 2024-08-05 | End: 2024-08-05

## 2024-08-05 RX ORDER — MORPHINE SULFATE 4 MG/ML
4 INJECTION, SOLUTION INTRAMUSCULAR; INTRAVENOUS
Status: COMPLETED | OUTPATIENT
Start: 2024-08-05 | End: 2024-08-05

## 2024-08-05 RX ORDER — PANTOPRAZOLE SODIUM 40 MG/1
40 TABLET, DELAYED RELEASE ORAL DAILY
Qty: 30 TABLET | Refills: 0 | Status: SHIPPED | OUTPATIENT
Start: 2024-08-05 | End: 2024-09-06

## 2024-08-05 RX ORDER — SUCRALFATE 1 G/1
1 TABLET ORAL 4 TIMES DAILY
Qty: 100 TABLET | Refills: 1 | Status: SHIPPED | OUTPATIENT
Start: 2024-08-05

## 2024-08-05 RX ORDER — HYOSCYAMINE SULFATE 0.5 MG/ML
0.25 INJECTION, SOLUTION SUBCUTANEOUS
Status: COMPLETED | OUTPATIENT
Start: 2024-08-05 | End: 2024-08-05

## 2024-08-05 RX ORDER — PANTOPRAZOLE SODIUM 40 MG/10ML
40 INJECTION, POWDER, LYOPHILIZED, FOR SOLUTION INTRAVENOUS
Status: COMPLETED | OUTPATIENT
Start: 2024-08-05 | End: 2024-08-05

## 2024-08-05 RX ORDER — PROMETHAZINE HYDROCHLORIDE 25 MG/1
25 TABLET ORAL EVERY 6 HOURS PRN
Qty: 15 TABLET | Refills: 0 | Status: SHIPPED | OUTPATIENT
Start: 2024-08-05

## 2024-08-05 RX ADMIN — HYOSCYAMINE SULFATE 0.25 MG: 0.5 INJECTION, SOLUTION SUBCUTANEOUS at 06:08

## 2024-08-05 RX ADMIN — PANTOPRAZOLE SODIUM 40 MG: 40 INJECTION, POWDER, FOR SOLUTION INTRAVENOUS at 06:08

## 2024-08-05 RX ADMIN — MORPHINE SULFATE 4 MG: 4 INJECTION, SOLUTION INTRAMUSCULAR; INTRAVENOUS at 06:08

## 2024-08-05 RX ADMIN — ONDANSETRON 8 MG: 2 INJECTION INTRAMUSCULAR; INTRAVENOUS at 06:08

## 2024-08-07 ENCOUNTER — HOSPITAL ENCOUNTER (EMERGENCY)
Facility: HOSPITAL | Age: 34
Discharge: HOME | End: 2024-08-07
Attending: EMERGENCY MEDICINE
Payer: MEDICAID

## 2024-08-07 VITALS
DIASTOLIC BLOOD PRESSURE: 74 MMHG | TEMPERATURE: 98 F | BODY MASS INDEX: 23.74 KG/M2 | SYSTOLIC BLOOD PRESSURE: 170 MMHG | RESPIRATION RATE: 16 BRPM | HEART RATE: 85 BPM | WEIGHT: 129 LBS | HEIGHT: 62 IN | OXYGEN SATURATION: 100 %

## 2024-08-07 DIAGNOSIS — K31.84 GASTROPARESIS: Primary | ICD-10-CM

## 2024-08-07 LAB
ALBUMIN SERPL BCP-MCNC: 3.9 G/DL (ref 3.5–5.2)
ALP SERPL-CCNC: 111 U/L (ref 55–135)
ALT SERPL W/O P-5'-P-CCNC: 34 U/L (ref 10–44)
ANION GAP SERPL CALC-SCNC: 13 MMOL/L (ref 8–16)
ANION GAP SERPL CALC-SCNC: 20 MMOL/L (ref 8–16)
AST SERPL-CCNC: 28 U/L (ref 10–40)
BASOPHILS # BLD AUTO: 0.02 K/UL (ref 0–0.2)
BASOPHILS NFR BLD: 0.3 % (ref 0–1.9)
BILIRUB SERPL-MCNC: 2.7 MG/DL (ref 0.1–1)
BUN SERPL-MCNC: 20 MG/DL (ref 6–20)
BUN SERPL-MCNC: 20 MG/DL (ref 6–20)
CALCIUM SERPL-MCNC: 8.3 MG/DL (ref 8.7–10.5)
CALCIUM SERPL-MCNC: 9.5 MG/DL (ref 8.7–10.5)
CHLORIDE SERPL-SCNC: 101 MMOL/L (ref 95–110)
CHLORIDE SERPL-SCNC: 97 MMOL/L (ref 95–110)
CO2 SERPL-SCNC: 27 MMOL/L (ref 23–29)
CO2 SERPL-SCNC: 28 MMOL/L (ref 23–29)
CREAT SERPL-MCNC: 4.7 MG/DL (ref 0.5–1.4)
CREAT SERPL-MCNC: 4.8 MG/DL (ref 0.5–1.4)
DIFFERENTIAL METHOD BLD: ABNORMAL
EOSINOPHIL # BLD AUTO: 0.1 K/UL (ref 0–0.5)
EOSINOPHIL NFR BLD: 0.8 % (ref 0–8)
ERYTHROCYTE [DISTWIDTH] IN BLOOD BY AUTOMATED COUNT: 20.5 % (ref 11.5–14.5)
EST. GFR  (NO RACE VARIABLE): 11.5 ML/MIN/1.73 M^2
EST. GFR  (NO RACE VARIABLE): 11.8 ML/MIN/1.73 M^2
GLUCOSE SERPL-MCNC: 175 MG/DL (ref 70–110)
GLUCOSE SERPL-MCNC: 313 MG/DL (ref 70–110)
HCG INTACT+B SERPL-ACNC: <1.2 MIU/ML
HCT VFR BLD AUTO: 31.1 % (ref 37–48.5)
HGB BLD-MCNC: 10.3 G/DL (ref 12–16)
IMM GRANULOCYTES # BLD AUTO: 0.07 K/UL (ref 0–0.04)
IMM GRANULOCYTES NFR BLD AUTO: 0.9 % (ref 0–0.5)
LIPASE SERPL-CCNC: 26 U/L (ref 4–60)
LYMPHOCYTES # BLD AUTO: 1 K/UL (ref 1–4.8)
LYMPHOCYTES NFR BLD: 13 % (ref 18–48)
MCH RBC QN AUTO: 30 PG (ref 27–31)
MCHC RBC AUTO-ENTMCNC: 33.1 G/DL (ref 32–36)
MCV RBC AUTO: 91 FL (ref 82–98)
MONOCYTES # BLD AUTO: 1 K/UL (ref 0.3–1)
MONOCYTES NFR BLD: 13 % (ref 4–15)
NEUTROPHILS # BLD AUTO: 5.5 K/UL (ref 1.8–7.7)
NEUTROPHILS NFR BLD: 72 % (ref 38–73)
NRBC BLD-RTO: 0 /100 WBC
PLATELET # BLD AUTO: 174 K/UL (ref 150–450)
PMV BLD AUTO: 11.2 FL (ref 9.2–12.9)
POCT GLUCOSE: 166 MG/DL (ref 70–110)
POCT GLUCOSE: 277 MG/DL (ref 70–110)
POTASSIUM SERPL-SCNC: 3.6 MMOL/L (ref 3.5–5.1)
POTASSIUM SERPL-SCNC: 4 MMOL/L (ref 3.5–5.1)
PROT SERPL-MCNC: 7.5 G/DL (ref 6–8.4)
RBC # BLD AUTO: 3.43 M/UL (ref 4–5.4)
SODIUM SERPL-SCNC: 142 MMOL/L (ref 136–145)
SODIUM SERPL-SCNC: 144 MMOL/L (ref 136–145)
WBC # BLD AUTO: 7.63 K/UL (ref 3.9–12.7)

## 2024-08-07 PROCEDURE — 63600175 PHARM REV CODE 636 W HCPCS: Performed by: EMERGENCY MEDICINE

## 2024-08-07 PROCEDURE — 85025 COMPLETE CBC W/AUTO DIFF WBC: CPT | Performed by: EMERGENCY MEDICINE

## 2024-08-07 PROCEDURE — 99900035 HC TECH TIME PER 15 MIN (STAT)

## 2024-08-07 PROCEDURE — 96375 TX/PRO/DX INJ NEW DRUG ADDON: CPT

## 2024-08-07 PROCEDURE — 80048 BASIC METABOLIC PNL TOTAL CA: CPT | Mod: XB | Performed by: EMERGENCY MEDICINE

## 2024-08-07 PROCEDURE — 96374 THER/PROPH/DIAG INJ IV PUSH: CPT

## 2024-08-07 PROCEDURE — 84702 CHORIONIC GONADOTROPIN TEST: CPT | Performed by: EMERGENCY MEDICINE

## 2024-08-07 PROCEDURE — 82962 GLUCOSE BLOOD TEST: CPT

## 2024-08-07 PROCEDURE — 80053 COMPREHEN METABOLIC PANEL: CPT | Performed by: EMERGENCY MEDICINE

## 2024-08-07 PROCEDURE — 99284 EMERGENCY DEPT VISIT MOD MDM: CPT

## 2024-08-07 PROCEDURE — 83690 ASSAY OF LIPASE: CPT | Performed by: EMERGENCY MEDICINE

## 2024-08-07 PROCEDURE — 96361 HYDRATE IV INFUSION ADD-ON: CPT

## 2024-08-07 PROCEDURE — 27000221 HC OXYGEN, UP TO 24 HOURS

## 2024-08-07 PROCEDURE — 94761 N-INVAS EAR/PLS OXIMETRY MLT: CPT

## 2024-08-07 RX ORDER — HYDROMORPHONE HYDROCHLORIDE 1 MG/ML
1 INJECTION, SOLUTION INTRAMUSCULAR; INTRAVENOUS; SUBCUTANEOUS
Status: COMPLETED | OUTPATIENT
Start: 2024-08-07 | End: 2024-08-07

## 2024-08-07 RX ORDER — HALOPERIDOL 5 MG/ML
2 INJECTION INTRAMUSCULAR
Status: COMPLETED | OUTPATIENT
Start: 2024-08-07 | End: 2024-08-07

## 2024-08-07 RX ADMIN — HALOPERIDOL LACTATE 2 MG: 5 INJECTION, SOLUTION INTRAMUSCULAR at 09:08

## 2024-08-07 RX ADMIN — SODIUM CHLORIDE, POTASSIUM CHLORIDE, SODIUM LACTATE AND CALCIUM CHLORIDE 1000 ML: 600; 310; 30; 20 INJECTION, SOLUTION INTRAVENOUS at 09:08

## 2024-08-07 RX ADMIN — HYDROMORPHONE HYDROCHLORIDE 1 MG: 1 INJECTION, SOLUTION INTRAMUSCULAR; INTRAVENOUS; SUBCUTANEOUS at 09:08

## 2024-08-07 RX ADMIN — HUMAN INSULIN 4 UNITS: 100 INJECTION, SOLUTION SUBCUTANEOUS at 10:08

## 2024-08-07 NOTE — ED PROVIDER NOTES
History     Chief Complaint   Patient presents with    Abdominal Pain     Pt. C/o abd. Pain. Hx. Dialysis and gastroperisis.      HPI:  Tabby Howard is a 34 y.o. female with PMH as below who presents to the [facility] emergency department for evaluation of 3 days of gradual onset, severe, generalized abdominal pain identical to several prior episodes of gastroparesis. Seen on  in [facility] ED for similar. Patient is anuric on HD TThSa.       PCP: Melony Kim NP    Review of patient's allergies indicates:   Allergen Reactions    Droperidol Hives    Penicillins Hives      Past Medical History:   Diagnosis Date    ESRD on hemodialysis 2022    Gastritis 2022    EGD was 22    Gastroparesis 2022    has not had Emptying study    Heart failure with preserved ejection fraction 2022    EF 55% on 3/22    History of supraventricular tachycardia     Hyperkalemia 2022    Hypertensive emergency 2022    Sickle cell trait 2022    Type 2 diabetes mellitus      Past Surgical History:   Procedure Laterality Date     SECTION      x 3    COLONOSCOPY      COLONOSCOPY N/A 2022    Procedure: COLONOSCOPY;  Surgeon: Jagdeep Cedeno MD;  Location: [facility];  Service: Endoscopy;  Laterality: N/A;    DESTRUCTION, CILIARY BODY, USING LASER Left 3/8/2024    Procedure: Cyclophotocoagulation G-probe, retrobulbar chlorpromazine left eye;  Surgeon: Fidel Wise MD;  Location: [address];  Service: Ophthalmology;  Laterality: Left;    ENDOSCOPIC ULTRASOUND OF UPPER GASTROINTESTINAL TRACT N/A 2023    Procedure: ULTRASOUND, UPPER GI TRACT, ENDOSCOPIC;  Surgeon: Micky Paredes III, MD;  Location: [facility];  Service: Endoscopy;  Laterality: N/A;    ESOPHAGOGASTRODUODENOSCOPY N/A 10/18/2019    Procedure: ESOPHAGOGASTRODUODENOSCOPY (EGD);  Surgeon: Gianluca Mendez MD;  Location: [facility];  Service: Endoscopy;  Laterality: N/A;    ESOPHAGOGASTRODUODENOSCOPY N/A  08/24/2022    Procedure: EGD (ESOPHAGOGASTRODUODENOSCOPY);  Surgeon: Micky Paredes III, MD;  Location: [facility] ENDO;  Service: Endoscopy;  Laterality: N/A;    ESOPHAGOGASTRODUODENOSCOPY N/A 12/5/2022    Procedure: EGD (ESOPHAGOGASTRODUODENOSCOPY);  Surgeon: Marcelo Zhong MD;  Location: [facility] ENDO;  Service: Endoscopy;  Laterality: N/A;    INSERTION, CATHETER, TUNNELED N/A 6/17/2023    Procedure: Insertion,catheter,tunneled;  Surgeon: Carlos Thurman Jr., MD;  Location: [facility] OR;  Service: General;  Laterality: N/A;    LAPAROSCOPIC CHOLECYSTECTOMY N/A 07/30/2022    Procedure: CHOLECYSTECTOMY, LAPAROSCOPIC;  Surgeon: Grey Perez MD;  Location: [facility] OR;  Service: General;  Laterality: N/A;    PLACEMENT OF DUAL-LUMEN VASCULAR CATHETER Left 07/12/2022    Procedure: INSERTION-CATHETER-JOSEPH;  Surgeon: Dionte Gan MD;  Location: [facility] OR;  Service: General;  Laterality: Left;    PLACEMENT OF DUAL-LUMEN VASCULAR CATHETER Right 07/26/2022    Procedure: INSERTION-CATHETER-Hemosplit;  Surgeon: Dionte Gan MD;  Location: [facility] OR;  Service: General;  Laterality: Right;       Family History   Problem Relation Name Age of Onset    Diabetes Mother      Diabetes Father       Social History     Tobacco Use    Smoking status: Never    Smokeless tobacco: Never   Substance and Sexual Activity    Alcohol use: Not Currently    Drug use: No    Sexual activity: Not Currently     Partners: Male     Birth control/protection: I.U.D.      Review of Systems     Review of Systems   Constitutional: Negative.  Negative for chills and fever.   HENT: Negative.     Eyes: Negative.    Respiratory: Negative.     Cardiovascular: Negative.    Gastrointestinal:  Positive for nausea and vomiting.   Endocrine: Negative.    Genitourinary: Negative.    Musculoskeletal: Negative.    Skin: Negative.    Allergic/Immunologic: Negative.    Neurological: Negative.    Hematological: Negative.    Psychiatric/Behavioral: Negative.     All other  systems reviewed and are negative.       Physical Exam     Initial Vitals   BP Pulse Resp Temp SpO2   08/07/24 0943 08/07/24 0900 08/07/24 0900 08/07/24 0900 08/07/24 0900   125/86 105 20 98.4 °F (36.9 °C) 95 %      MAP       --                 Nursing notes and vital signs reviewed.  Constitutional: Patient is in severe distress, writing.   Head: Normocephalic. Atraumatic.   Eyes:  Conjunctivae are not pale. No scleral icterus.   ENT: Mucous membranes moist.   Neck: Supple.   Cardiovascular: Tachycardic rate. Regular rhythm. No murmurs, rubs, or gallops   Pulmonary: No respiratory distress. Clear to auscultation bilaterally   Abdominal: Non-distended.   Musculoskeletal: Moves all extremities. No obvious deformities.   Skin: Warm and dry.   Neurological:  Alert, awake, and appropriate. Normal speech. No acute lateralizing neurologic deficits appreciated.   Psychiatric: Normal affect.       ED Course   Critical Care    Date/Time: 8/7/2024 12:30 PM    Performed by: Akin Flores MD  Authorized by: Akin Flores MD  Direct patient critical care time: 15 minutes  Additional history critical care time: 5 minutes  Ordering / reviewing critical care time: 7 minutes  Documentation critical care time: 7 minutes  Total critical care time (exclusive of procedural time) : 34 minutes  Critical care time was exclusive of separately billable procedures and treating other patients and teaching time.  Critical care was necessary to treat or prevent imminent or life-threatening deterioration of the following conditions: dehydration and metabolic crisis (hyperosmolar syndrome).  Critical care was time spent personally by me on the following activities: blood draw for specimens, development of treatment plan with patient or surrogate, interpretation of cardiac output measurements, evaluation of patient's response to treatment, examination of patient, obtaining history from patient or surrogate, ordering and performing  "treatments and interventions, ordering and review of laboratory studies, ordering and review of radiographic studies, pulse oximetry, re-evaluation of patient's condition and review of old charts.        Vitals:    08/07/24 0900 08/07/24 0923 08/07/24 0943 08/07/24 1040   BP:   125/86 (!) 181/98   Pulse: 105  92    Resp: 20 20     Temp: 98.4 °F (36.9 °C)      TempSrc: Oral      SpO2: 95%  (!) 82%    Weight: 58.5 kg (129 lb)      Height: 5' 2" (1.575 m)       08/07/24 1100   BP: (!) 191/74   Pulse: 86   Resp: 14   Temp:    TempSrc:    SpO2: 100%   Weight:    Height:      Lab Results Interpreted as Abnormal:  Labs Reviewed   CBC W/ AUTO DIFFERENTIAL - Abnormal       Result Value    WBC 7.63      RBC 3.43 (*)     Hemoglobin 10.3 (*)     Hematocrit 31.1 (*)     MCV 91      MCH 30.0      MCHC 33.1      RDW 20.5 (*)     Platelets 174      MPV 11.2      Immature Granulocytes 0.9 (*)     Gran # (ANC) 5.5      Immature Grans (Abs) 0.07 (*)     Lymph # 1.0      Mono # 1.0      Eos # 0.1      Baso # 0.02      nRBC 0      Gran % 72.0      Lymph % 13.0 (*)     Mono % 13.0      Eosinophil % 0.8      Basophil % 0.3      Differential Method Automated      Narrative:     Release to patient->Immediate   COMPREHENSIVE METABOLIC PANEL - Abnormal    Sodium 144      Potassium 4.0      Chloride 97      CO2 27      Glucose 313 (*)     BUN 20      Creatinine 4.8 (*)     Calcium 9.5      Total Protein 7.5      Albumin 3.9      Total Bilirubin 2.7 (*)     Alkaline Phosphatase 111      AST 28      ALT 34      eGFR 11.5 (*)     Anion Gap 20 (*)     Narrative:     Release to patient->Immediate   BASIC METABOLIC PANEL - Abnormal    Sodium 142      Potassium 3.6      Chloride 101      CO2 28      Glucose 175 (*)     BUN 20      Creatinine 4.7 (*)     Calcium 8.3 (*)     Anion Gap 13      eGFR 11.8 (*)    POCT GLUCOSE - Abnormal    POCT Glucose 277 (*)    POCT GLUCOSE - Abnormal    POCT Glucose 166 (*)    LIPASE    Lipase 26      Narrative:     " Release to patient->Immediate   HCG, QUANTITATIVE    HCG Quant <1.2      Narrative:     Release to patient->Immediate   POCT GLUCOSE MONITORING CONTINUOUS      All Lab Results:  Results for orders placed or performed during the hospital encounter of 08/07/24   CBC auto differential   Result Value Ref Range    WBC 7.63 3.90 - 12.70 K/uL    RBC 3.43 (L) 4.00 - 5.40 M/uL    Hemoglobin 10.3 (L) 12.0 - 16.0 g/dL    Hematocrit 31.1 (L) 37.0 - 48.5 %    MCV 91 82 - 98 fL    MCH 30.0 27.0 - 31.0 pg    MCHC 33.1 32.0 - 36.0 g/dL    RDW 20.5 (H) 11.5 - 14.5 %    Platelets 174 150 - 450 K/uL    MPV 11.2 9.2 - 12.9 fL    Immature Granulocytes 0.9 (H) 0.0 - 0.5 %    Gran # (ANC) 5.5 1.8 - 7.7 K/uL    Immature Grans (Abs) 0.07 (H) 0.00 - 0.04 K/uL    Lymph # 1.0 1.0 - 4.8 K/uL    Mono # 1.0 0.3 - 1.0 K/uL    Eos # 0.1 0.0 - 0.5 K/uL    Baso # 0.02 0.00 - 0.20 K/uL    nRBC 0 0 /100 WBC    Gran % 72.0 38.0 - 73.0 %    Lymph % 13.0 (L) 18.0 - 48.0 %    Mono % 13.0 4.0 - 15.0 %    Eosinophil % 0.8 0.0 - 8.0 %    Basophil % 0.3 0.0 - 1.9 %    Differential Method Automated    Comprehensive metabolic panel   Result Value Ref Range    Sodium 144 136 - 145 mmol/L    Potassium 4.0 3.5 - 5.1 mmol/L    Chloride 97 95 - 110 mmol/L    CO2 27 23 - 29 mmol/L    Glucose 313 (H) 70 - 110 mg/dL    BUN 20 6 - 20 mg/dL    Creatinine 4.8 (H) 0.5 - 1.4 mg/dL    Calcium 9.5 8.7 - 10.5 mg/dL    Total Protein 7.5 6.0 - 8.4 g/dL    Albumin 3.9 3.5 - 5.2 g/dL    Total Bilirubin 2.7 (H) 0.1 - 1.0 mg/dL    Alkaline Phosphatase 111 55 - 135 U/L    AST 28 10 - 40 U/L    ALT 34 10 - 44 U/L    eGFR 11.5 (A) >60 mL/min/1.73 m^2    Anion Gap 20 (H) 8 - 16 mmol/L   Lipase   Result Value Ref Range    Lipase 26 4 - 60 U/L   hCG, quantitative, pregnancy   Result Value Ref Range    HCG Quant <1.2 See Text mIU/mL   Basic metabolic panel   Result Value Ref Range    Sodium 142 136 - 145 mmol/L    Potassium 3.6 3.5 - 5.1 mmol/L    Chloride 101 95 - 110 mmol/L    CO2 28 23 -  29 mmol/L    Glucose 175 (H) 70 - 110 mg/dL    BUN 20 6 - 20 mg/dL    Creatinine 4.7 (H) 0.5 - 1.4 mg/dL    Calcium 8.3 (L) 8.7 - 10.5 mg/dL    Anion Gap 13 8 - 16 mmol/L    eGFR 11.8 (A) >60 mL/min/1.73 m^2   POCT glucose   Result Value Ref Range    POCT Glucose 277 (H) 70 - 110 mg/dL   POCT glucose   Result Value Ref Range    POCT Glucose 166 (H) 70 - 110 mg/dL     Imaging Results    None        The emergency physician reviewed the vital signs and test results, which are outlined above.     ED Discussion      Mild DKA resolved after IV insulin and fluids.     Patient's symptoms markedly improved after ED treatment.     Patient's evaluation in the ED does not suggest any emergent or life-threatening medical conditions requiring immediate intervention beyond what was provided in the ED, and I believe patient is safe for discharge. Regardless, an unremarkable evaluation in the ED does not preclude the development or presence of a serious or life-threatening condition. As such, patient was given return instructions for any change or worsening of symptoms.           ED Medication(s) Administered:  Medications   haloperidol lactate injection 2 mg (2 mg Intravenous Given 8/7/24 0927)   HYDROmorphone injection 1 mg (1 mg Intravenous Given 8/7/24 0923)   lactated ringers bolus 1,000 mL (0 mLs Intravenous Stopped 8/7/24 1036)   insulin regular injection 4 Units 0.04 mL (4 Units Intravenous Given 8/7/24 1042)       Prescription Management: I performed a review of the patient's current Rx medication list as input by nursing staff.    Patient's Medications   New Prescriptions    No medications on file   Previous Medications    ACETAZOLAMIDE (DIAMOX) 250 MG TABLET    take 1 tablet by mouth 3 times a day    ALBUTEROL (VENTOLIN HFA) 90 MCG/ACTUATION INHALER    Inhale 2 puffs every 4 hours by inhalation route.    APIXABAN (ELIQUIS) 5 MG TAB    Take 1 tablet (5 mg total) by mouth 2 (two) times daily. Patient w/ thrombocytopenia  <50, so held during admission, follow-up with hematologist about restarting    ATROPINE 1% (ISOPTO ATROPINE) 1 % DROP    Place 1 drop into the left eye 2 (two) times a day.    BLOOD SUGAR DIAGNOSTIC (TRUE METRIX GLUCOSE TEST STRIP) STRP    Use 1 strip to check blood glucose three times daily    BLOOD-GLUCOSE METER (TRUE METRIX GLUCOSE METER) MISC    Use to check blood glucose    BLOOD-GLUCOSE METER,CONTINUOUS (DEXCOM ) MISC    USE WITH SENSORS    BLOOD-GLUCOSE SENSOR (DEXCOM G7 SENSOR) ELIZABETH    Use as directed for CGM. Change sensor every 10 days    BLOOD-GLUCOSE SENSOR ELIZABETH    CHANGE EVERY 10 DAYS    BRIMONIDINE 0.15 % OPTH DROP (ALPHAGAN) 0.15 % OPHTHALMIC SOLUTION    Place 1 drop into both eyes 3 (three) times daily.    BRINZOLAMIDE (AZOPT) 1 % OPHTHALMIC SUSPENSION    Place1 drop in left eye 3 times a day for 30 days    BUSPIRONE (BUSPAR) 10 MG TABLET    Take 1 tablet (10 mg total) by mouth 3 (three) times daily as needed (anxiety).    CETIRIZINE (ZYRTEC) 10 MG TABLET    Take 1 tablet every day by oral route.    DORZOLAMIDE-TIMOLOL 2-0.5% (COSOPT) 22.3-6.8 MG/ML OPHTHALMIC SOLUTION    place 1 drop in left eye twice a day  for 30 days    FLUOXETINE 40 MG CAPSULE    Take 1 capsule every day by oral route.    FLUTICASONE PROPIONATE (FLONASE ALLERGY RELIEF) 50 MCG/ACTUATION NASAL SPRAY    [place] 1 spray every day by intranasal route.    GABAPENTIN (NEURONTIN) 300 MG CAPSULE    Take 2 capsules twice a day by oral route for 90 days.    HYDRALAZINE (APRESOLINE) 25 MG TABLET    take 1 tablet by mouth every 8 hours    INSULIN GLARGINE U-100, LANTUS, (LANTUS SOLOSTAR U-100 INSULIN) 100 UNIT/ML (3 ML) INPN PEN    Inject 22 units every day by subcutaneous route in the evening for 30 days, for diabetes.    INSULIN GLARGINE U-100, LANTUS, 100 UNIT/ML INJECTION    Inject 13 Units into the skin 2 (two) times a day.    LANCETS (TRUEPLUS LANCETS) 33 GAUGE MISC    Use 1 to check blood glucose three times daily    LANCETS 33  "GAUGE MISC    Use to test twice daily    LEVETIRACETAM (KEPPRA) 500 MG TAB    Take 1 tablet (500 mg total) by mouth 2 (two) times daily.    LORAZEPAM (ATIVAN) 0.5 MG TABLET    Take 1 tablet 3 times a day by oral route for 7 days, for severe panic.    ONDANSETRON (ZOFRAN-ODT) 4 MG TBDL    Place 1 tablet (4 mg) on or under the tongue every 8 hours for 10 days.    OXYCODONE-ACETAMINOPHEN (PERCOCET)  MG PER TABLET    Take 1 tablet by mouth every 4 (four) hours as needed for Pain.    PANTOPRAZOLE (PROTONIX) 40 MG TABLET    Take 1 tablet (40 mg total) by mouth once daily.    PEN NEEDLE, DIABETIC 31 GAUGE X 3/16" NDLE    Use as directed with insulin once daily    PREDNISOLONE ACETATE (PRED FORTE) 1 % DRPS    Place 1 drop into the left eye 2 (two) times daily.    PREDNISONE (DELTASONE) 20 MG TABLET    take 3 tablets Oral Daily,x30 day(s)    PROMETHAZINE (PHENERGAN) 25 MG TABLET    Take 1 tablet (25 mg total) by mouth every 6 (six) hours as needed.    SEVELAMER CARBONATE (RENVELA) 800 MG TAB    Take 1 tablet (800 mg total) by mouth 3 (three) times daily with meals.    SUCRALFATE (CARAFATE) 1 GRAM TABLET    Take 1 tablet (1 g total) by mouth 4 (four) times daily.    TIMOLOL MALEATE 0.5% (TIMOPTIC) 0.5 % DROP    Place 1 drop in left eye 2 times a day for 30 days   Modified Medications    No medications on file   Discontinued Medications    No medications on file         Follow-up Information       Schedule an appointment as soon as possible for a visit  with Melony Kim NP.    Specialty: Family Medicine  Contact information:  [address]  [place] [address]  [phone]               [facility] Emergency Dept.    Specialty: Emergency Medicine  Why: As needed, If symptoms worsen  Contact information:  [address] [facility] [address] [phone]                          Clinical Impression       ICD-10-CM ICD-9-CM   1. Gastroparesis  K31.84 536.3      ED Disposition Condition    " Discharge Stable             Akin Flores MD  08/07/24 9009

## 2024-08-20 ENCOUNTER — HOSPITAL ENCOUNTER (EMERGENCY)
Facility: HOSPITAL | Age: 34
Discharge: HOME | End: 2024-08-20
Attending: EMERGENCY MEDICINE
Payer: MEDICAID

## 2024-08-20 VITALS
HEIGHT: 62 IN | RESPIRATION RATE: 15 BRPM | HEART RATE: 85 BPM | SYSTOLIC BLOOD PRESSURE: 165 MMHG | TEMPERATURE: 98 F | BODY MASS INDEX: 22.08 KG/M2 | OXYGEN SATURATION: 99 % | DIASTOLIC BLOOD PRESSURE: 71 MMHG | WEIGHT: 120 LBS

## 2024-08-20 DIAGNOSIS — E11.65 SEVERE HYPERGLYCEMIA DUE TO DIABETES MELLITUS: Primary | ICD-10-CM

## 2024-08-20 DIAGNOSIS — E87.29 HIGH ANION GAP METABOLIC ACIDOSIS: ICD-10-CM

## 2024-08-20 DIAGNOSIS — K31.84 GASTROPARESIS: ICD-10-CM

## 2024-08-20 DIAGNOSIS — R05.9 COUGH: ICD-10-CM

## 2024-08-20 LAB
ACETONE BLD-MCNC: NEGATIVE MG/DL
ACETONE BLD-MCNC: NEGATIVE MG/DL
ALBUMIN SERPL BCP-MCNC: 3.3 G/DL (ref 3.5–5.2)
ALP SERPL-CCNC: 126 U/L (ref 55–135)
ALT SERPL W/O P-5'-P-CCNC: 59 U/L (ref 10–44)
ANION GAP SERPL CALC-SCNC: 13 MMOL/L (ref 8–16)
ANION GAP SERPL CALC-SCNC: 18 MMOL/L (ref 8–16)
AST SERPL-CCNC: 66 U/L (ref 10–40)
BASOPHILS # BLD AUTO: 0.01 K/UL (ref 0–0.2)
BASOPHILS NFR BLD: 0.1 % (ref 0–1.9)
BILIRUB SERPL-MCNC: 0.8 MG/DL (ref 0.1–1)
BUN SERPL-MCNC: 53 MG/DL (ref 6–20)
BUN SERPL-MCNC: 58 MG/DL (ref 6–20)
CALCIUM SERPL-MCNC: 9.3 MG/DL (ref 8.7–10.5)
CALCIUM SERPL-MCNC: 9.6 MG/DL (ref 8.7–10.5)
CHLORIDE SERPL-SCNC: 95 MMOL/L (ref 95–110)
CHLORIDE SERPL-SCNC: 99 MMOL/L (ref 95–110)
CO2 SERPL-SCNC: 20 MMOL/L (ref 23–29)
CO2 SERPL-SCNC: 22 MMOL/L (ref 23–29)
CREAT SERPL-MCNC: 7.4 MG/DL (ref 0.5–1.4)
CREAT SERPL-MCNC: 7.7 MG/DL (ref 0.5–1.4)
DIFFERENTIAL METHOD BLD: ABNORMAL
EOSINOPHIL # BLD AUTO: 0.1 K/UL (ref 0–0.5)
EOSINOPHIL NFR BLD: 0.5 % (ref 0–8)
ERYTHROCYTE [DISTWIDTH] IN BLOOD BY AUTOMATED COUNT: 17.8 % (ref 11.5–14.5)
EST. GFR  (NO RACE VARIABLE): 6.5 ML/MIN/1.73 M^2
EST. GFR  (NO RACE VARIABLE): 6.8 ML/MIN/1.73 M^2
GLUCOSE SERPL-MCNC: 408 MG/DL (ref 70–110)
GLUCOSE SERPL-MCNC: 640 MG/DL (ref 70–110)
HCT VFR BLD AUTO: 28.8 % (ref 37–48.5)
HGB BLD-MCNC: 9.4 G/DL (ref 12–16)
IMM GRANULOCYTES # BLD AUTO: 0.19 K/UL (ref 0–0.04)
IMM GRANULOCYTES NFR BLD AUTO: 1.7 % (ref 0–0.5)
LIPASE SERPL-CCNC: 46 U/L (ref 4–60)
LYMPHOCYTES # BLD AUTO: 0.7 K/UL (ref 1–4.8)
LYMPHOCYTES NFR BLD: 6.1 % (ref 18–48)
MCH RBC QN AUTO: 29.1 PG (ref 27–31)
MCHC RBC AUTO-ENTMCNC: 32.6 G/DL (ref 32–36)
MCV RBC AUTO: 89 FL (ref 82–98)
MONOCYTES # BLD AUTO: 0.4 K/UL (ref 0.3–1)
MONOCYTES NFR BLD: 4 % (ref 4–15)
NEUTROPHILS # BLD AUTO: 9.7 K/UL (ref 1.8–7.7)
NEUTROPHILS NFR BLD: 87.6 % (ref 38–73)
NRBC BLD-RTO: 0 /100 WBC
PLATELET # BLD AUTO: 103 K/UL (ref 150–450)
PMV BLD AUTO: 9.6 FL (ref 9.2–12.9)
POCT GLUCOSE: 356 MG/DL (ref 70–110)
POCT GLUCOSE: >500 MG/DL (ref 70–110)
POCT GLUCOSE: >500 MG/DL (ref 70–110)
POTASSIUM SERPL-SCNC: 5.8 MMOL/L (ref 3.5–5.1)
POTASSIUM SERPL-SCNC: 5.8 MMOL/L (ref 3.5–5.1)
PROT SERPL-MCNC: 6.7 G/DL (ref 6–8.4)
RBC # BLD AUTO: 3.23 M/UL (ref 4–5.4)
SODIUM SERPL-SCNC: 133 MMOL/L (ref 136–145)
SODIUM SERPL-SCNC: 134 MMOL/L (ref 136–145)
WBC # BLD AUTO: 11.01 K/UL (ref 3.9–12.7)

## 2024-08-20 PROCEDURE — 74176 CT ABD & PELVIS W/O CONTRAST: CPT | Mod: TC

## 2024-08-20 PROCEDURE — 71045 X-RAY EXAM CHEST 1 VIEW: CPT | Mod: 26,,, | Performed by: RADIOLOGY

## 2024-08-20 PROCEDURE — 36415 COLL VENOUS BLD VENIPUNCTURE: CPT | Performed by: EMERGENCY MEDICINE

## 2024-08-20 PROCEDURE — 96375 TX/PRO/DX INJ NEW DRUG ADDON: CPT

## 2024-08-20 PROCEDURE — 80048 BASIC METABOLIC PNL TOTAL CA: CPT | Mod: XB | Performed by: EMERGENCY MEDICINE

## 2024-08-20 PROCEDURE — 96361 HYDRATE IV INFUSION ADD-ON: CPT

## 2024-08-20 PROCEDURE — 63600175 PHARM REV CODE 636 W HCPCS: Performed by: EMERGENCY MEDICINE

## 2024-08-20 PROCEDURE — 82009 KETONE BODYS QUAL: CPT | Mod: 91 | Performed by: EMERGENCY MEDICINE

## 2024-08-20 PROCEDURE — 74176 CT ABD & PELVIS W/O CONTRAST: CPT | Mod: 26,,, | Performed by: RADIOLOGY

## 2024-08-20 PROCEDURE — 85025 COMPLETE CBC W/AUTO DIFF WBC: CPT | Performed by: EMERGENCY MEDICINE

## 2024-08-20 PROCEDURE — 83690 ASSAY OF LIPASE: CPT | Performed by: EMERGENCY MEDICINE

## 2024-08-20 PROCEDURE — 99291 CRITICAL CARE FIRST HOUR: CPT | Mod: 25

## 2024-08-20 PROCEDURE — 80053 COMPREHEN METABOLIC PANEL: CPT | Performed by: EMERGENCY MEDICINE

## 2024-08-20 PROCEDURE — 71045 X-RAY EXAM CHEST 1 VIEW: CPT | Mod: TC

## 2024-08-20 PROCEDURE — 82962 GLUCOSE BLOOD TEST: CPT

## 2024-08-20 PROCEDURE — 96374 THER/PROPH/DIAG INJ IV PUSH: CPT

## 2024-08-20 PROCEDURE — 25000003 PHARM REV CODE 250: Performed by: EMERGENCY MEDICINE

## 2024-08-20 RX ORDER — HYDROMORPHONE HYDROCHLORIDE 1 MG/ML
1 INJECTION, SOLUTION INTRAMUSCULAR; INTRAVENOUS; SUBCUTANEOUS
Status: COMPLETED | OUTPATIENT
Start: 2024-08-20 | End: 2024-08-20

## 2024-08-20 RX ORDER — PREDNISONE 20 MG/1
TABLET ORAL
Qty: 90 TABLET | Refills: 0 | Status: CANCELLED | OUTPATIENT
Start: 2024-08-20

## 2024-08-20 RX ORDER — HALOPERIDOL 5 MG/ML
2 INJECTION INTRAMUSCULAR
Status: COMPLETED | OUTPATIENT
Start: 2024-08-20 | End: 2024-08-20

## 2024-08-20 RX ADMIN — HYDROMORPHONE HYDROCHLORIDE 1 MG: 1 INJECTION, SOLUTION INTRAMUSCULAR; INTRAVENOUS; SUBCUTANEOUS at 08:08

## 2024-08-20 RX ADMIN — HUMAN INSULIN 6 UNITS: 100 INJECTION, SOLUTION SUBCUTANEOUS at 10:08

## 2024-08-20 RX ADMIN — SODIUM CHLORIDE, POTASSIUM CHLORIDE, SODIUM LACTATE AND CALCIUM CHLORIDE 1000 ML: 600; 310; 30; 20 INJECTION, SOLUTION INTRAVENOUS at 08:08

## 2024-08-20 RX ADMIN — SODIUM ZIRCONIUM CYCLOSILICATE 10 G: 5 POWDER, FOR SUSPENSION ORAL at 12:08

## 2024-08-20 RX ADMIN — HALOPERIDOL LACTATE 2 MG: 5 INJECTION, SOLUTION INTRAMUSCULAR at 08:08

## 2024-08-20 NOTE — ED PROVIDER NOTES
History     Chief Complaint   Patient presents with    Flank Pain     Patient relays that she awoke with Left flank pain this am      HPI:  Tabby Howard is a 34 y.o. female with PMH as below who presents to the [facility] emergency department for evaluation of gradual onset, severe left flank pain somewhat similar to prior episodes of chronic intermittent abdominal pain / gastroparesis. She has associated nausea and vomiting. She had skipped a recent insulin dose.     PCP: Melony Kim NP    Review of patient's allergies indicates:   Allergen Reactions    Droperidol Hives    Haldol [haloperidol lactate] Hives    Penicillins Hives      Past Medical History:   Diagnosis Date    ESRD on hemodialysis 2022    Gastritis 2022    EGD was 22    Gastroparesis 2022    has not had Emptying study    Heart failure with preserved ejection fraction 2022    EF 55% on 3/22    History of supraventricular tachycardia     Hyperkalemia 2022    Hypertensive emergency 2022    Sickle cell trait 2022    Type 2 diabetes mellitus      Past Surgical History:   Procedure Laterality Date     SECTION      x 3    COLONOSCOPY      COLONOSCOPY N/A 2022    Procedure: COLONOSCOPY;  Surgeon: Jagdeep Cedeno MD;  Location: [facility];  Service: Endoscopy;  Laterality: N/A;    DESTRUCTION, CILIARY BODY, USING LASER Left 2024    Procedure: Cyclophotocoagulation G-probe, retrobulbar chlorpromazine left eye;  Surgeon: Fidel Wise MD;  Location: [address];  Service: Ophthalmology;  Laterality: Left;    ENDOSCOPIC ULTRASOUND OF UPPER GASTROINTESTINAL TRACT N/A 2023    Procedure: ULTRASOUND, UPPER GI TRACT, ENDOSCOPIC;  Surgeon: Micky Paredes III, MD;  Location: [facility];  Service: Endoscopy;  Laterality: N/A;    ESOPHAGOGASTRODUODENOSCOPY N/A 10/18/2019    Procedure: ESOPHAGOGASTRODUODENOSCOPY (EGD);  Surgeon: Gianluca Mendez MD;  Location: [facility];   Service: Endoscopy;  Laterality: N/A;    ESOPHAGOGASTRODUODENOSCOPY N/A 08/24/2022    Procedure: EGD (ESOPHAGOGASTRODUODENOSCOPY);  Surgeon: Micky Paredes III, MD;  Location: [facility] ENDO;  Service: Endoscopy;  Laterality: N/A;    ESOPHAGOGASTRODUODENOSCOPY N/A 12/05/2022    Procedure: EGD (ESOPHAGOGASTRODUODENOSCOPY);  Surgeon: Marcelo Zhong MD;  Location: [facility];  Service: Endoscopy;  Laterality: N/A;    EYE SURGERY      INSERTION, CATHETER, TUNNELED N/A 06/17/2023    Procedure: Insertion,catheter,tunneled;  Surgeon: Carlos Thurman Jr., MD;  Location: [facility];  Service: General;  Laterality: N/A;    LAPAROSCOPIC CHOLECYSTECTOMY N/A 07/30/2022    Procedure: CHOLECYSTECTOMY, LAPAROSCOPIC;  Surgeon: Grey Perez MD;  Location: [facility] OR;  Service: General;  Laterality: N/A;    PLACEMENT OF DUAL-LUMEN VASCULAR CATHETER Left 07/12/2022    Procedure: INSERTION-CATHETER-JOSEPH;  Surgeon: Dionte Gan MD;  Location: [facility] OR;  Service: General;  Laterality: Left;    PLACEMENT OF DUAL-LUMEN VASCULAR CATHETER Right 07/26/2022    Procedure: INSERTION-CATHETER-Hemosplit;  Surgeon: Dionte Gan MD;  Location: [facility] OR;  Service: General;  Laterality: Right;       Family History   Problem Relation Name Age of Onset    Diabetes Mother      Diabetes Father       Social History     Tobacco Use    Smoking status: Never    Smokeless tobacco: Never   Substance and Sexual Activity    Alcohol use: Not Currently    Drug use: No    Sexual activity: Not Currently     Partners: Male     Birth control/protection: I.U.D.      Review of Systems     Review of Systems   Constitutional: Negative.  Negative for fever.   HENT: Negative.     Eyes: Negative.    Respiratory: Negative.     Cardiovascular: Negative.    Gastrointestinal:  Positive for nausea and vomiting.   Endocrine: Negative.    Genitourinary:  Positive for flank pain. Negative for dysuria (anuric on HD).   Skin: Negative.    Allergic/Immunologic: Negative.     Neurological: Negative.    Hematological: Negative.    Psychiatric/Behavioral: Negative.     All other systems reviewed and are negative.       Physical Exam     Initial Vitals   BP Pulse Resp Temp SpO2   08/20/24 0753 08/20/24 0753 08/20/24 0753 08/20/24 0759 08/20/24 0753   (!) 184/113 90 18 98 °F (36.7 °C) 98 %      MAP       --                 Nursing notes and vital signs reviewed.  Constitutional: Patient is in moderate to severe distress.   Head: Normocephalic. Atraumatic.   Eyes:  Conjunctivae are not pale. No scleral icterus.   ENT: Mucous membranes moist.   Neck: Supple.   Cardiovascular: Regular rate. Regular rhythm. No murmurs, rubs, or gallops   Pulmonary: No respiratory distress. Clear to auscultation bilaterally   Abdominal: Soft. Non-distended. Generalized tenderness.   Genitourinary: left CVA tenderness     Musculoskeletal: Moves all extremities. No obvious deformities.   Skin: Warm and dry.   Neurological:  Alert, awake, and appropriate. Normal speech. No acute lateralizing neurologic deficits appreciated.   Psychiatric: Normal affect. Psychomotor agitation.      ED Course   Critical Care    Date/Time: 8/20/2024 8:00 AM    Performed by: Akin Flores MD  Authorized by: Akin Flores MD  Direct patient critical care time: 15 minutes  Additional history critical care time: 5 minutes  Ordering / reviewing critical care time: 7 minutes  Documentation critical care time: 7 minutes  Total critical care time (exclusive of procedural time) : 34 minutes  Critical care time was exclusive of separately billable procedures and treating other patients and teaching time.  Critical care was necessary to treat or prevent imminent or life-threatening deterioration of the following conditions: metabolic crisis.  Critical care was time spent personally by me on the following activities: blood draw for specimens, development of treatment plan with patient or surrogate, interpretation of cardiac output  "measurements, evaluation of patient's response to treatment, examination of patient, obtaining history from patient or surrogate, ordering and performing treatments and interventions, ordering and review of laboratory studies, ordering and review of radiographic studies, pulse oximetry, re-evaluation of patient's condition and review of old charts.        Vitals:    08/20/24 0753 08/20/24 0759 08/20/24 0814 08/20/24 0903   BP: (!) 184/113   (!) 148/65   Pulse: 90   86   Resp: 18  (!) 22 (!) 22   Temp:  98 °F (36.7 °C)     SpO2: 98%   97%   Weight: 54.4 kg (120 lb)      Height: 5' 2" (1.575 m)       08/20/24 0943   BP: (!) 165/71   Pulse: 85   Resp: 15   Temp:    SpO2: 99%   Weight:    Height:      Lab Results Interpreted as Abnormal:  Labs Reviewed   CBC W/ AUTO DIFFERENTIAL - Abnormal       Result Value    WBC 11.01      RBC 3.23 (*)     Hemoglobin 9.4 (*)     Hematocrit 28.8 (*)     MCV 89      MCH 29.1      MCHC 32.6      RDW 17.8 (*)     Platelets 103 (*)     MPV 9.6      Immature Granulocytes 1.7 (*)     Gran # (ANC) 9.7 (*)     Immature Grans (Abs) 0.19 (*)     Lymph # 0.7 (*)     Mono # 0.4      Eos # 0.1      Baso # 0.01      nRBC 0      Gran % 87.6 (*)     Lymph % 6.1 (*)     Mono % 4.0      Eosinophil % 0.5      Basophil % 0.1      Differential Method Automated     COMPREHENSIVE METABOLIC PANEL - Abnormal    Sodium 133 (*)     Potassium 5.8 (*)     Chloride 95      CO2 20 (*)     Glucose 640 (*)     BUN 53 (*)     Creatinine 7.7 (*)     Calcium 9.6      Total Protein 6.7      Albumin 3.3 (*)     Total Bilirubin 0.8      Alkaline Phosphatase 126      AST 66 (*)     ALT 59 (*)     eGFR 6.5 (*)     Anion Gap 18 (*)     Narrative:        Glu critical result(s) called and verbal readback obtained from josef Mason by JOSEF 08/20/2024 09:32   BASIC METABOLIC PANEL - Abnormal    Sodium 134 (*)     Potassium 5.8 (*)     Chloride 99      CO2 22 (*)     Glucose 408 (*)     BUN 58 (*)     Creatinine 7.4 (*)     " Calcium 9.3      Anion Gap 13      eGFR 6.8 (*)    POCT GLUCOSE - Abnormal    POCT Glucose >500 (*)    POCT GLUCOSE - Abnormal    POCT Glucose >500 (*)    POCT GLUCOSE - Abnormal    POCT Glucose 356 (*)    LIPASE    Lipase 46     ACETONE    Acetone, Bld Negative     ACETONE    Acetone, Bld Negative        All Lab Results:  Results for orders placed or performed during the hospital encounter of 08/20/24   CBC auto differential   Result Value Ref Range    WBC 11.01 3.90 - 12.70 K/uL    RBC 3.23 (L) 4.00 - 5.40 M/uL    Hemoglobin 9.4 (L) 12.0 - 16.0 g/dL    Hematocrit 28.8 (L) 37.0 - 48.5 %    MCV 89 82 - 98 fL    MCH 29.1 27.0 - 31.0 pg    MCHC 32.6 32.0 - 36.0 g/dL    RDW 17.8 (H) 11.5 - 14.5 %    Platelets 103 (L) 150 - 450 K/uL    MPV 9.6 9.2 - 12.9 fL    Immature Granulocytes 1.7 (H) 0.0 - 0.5 %    Gran # (ANC) 9.7 (H) 1.8 - 7.7 K/uL    Immature Grans (Abs) 0.19 (H) 0.00 - 0.04 K/uL    Lymph # 0.7 (L) 1.0 - 4.8 K/uL    Mono # 0.4 0.3 - 1.0 K/uL    Eos # 0.1 0.0 - 0.5 K/uL    Baso # 0.01 0.00 - 0.20 K/uL    nRBC 0 0 /100 WBC    Gran % 87.6 (H) 38.0 - 73.0 %    Lymph % 6.1 (L) 18.0 - 48.0 %    Mono % 4.0 4.0 - 15.0 %    Eosinophil % 0.5 0.0 - 8.0 %    Basophil % 0.1 0.0 - 1.9 %    Differential Method Automated    Comprehensive metabolic panel   Result Value Ref Range    Sodium 133 (L) 136 - 145 mmol/L    Potassium 5.8 (H) 3.5 - 5.1 mmol/L    Chloride 95 95 - 110 mmol/L    CO2 20 (L) 23 - 29 mmol/L    Glucose 640 (HH) 70 - 110 mg/dL    BUN 53 (H) 6 - 20 mg/dL    Creatinine 7.7 (H) 0.5 - 1.4 mg/dL    Calcium 9.6 8.7 - 10.5 mg/dL    Total Protein 6.7 6.0 - 8.4 g/dL    Albumin 3.3 (L) 3.5 - 5.2 g/dL    Total Bilirubin 0.8 0.1 - 1.0 mg/dL    Alkaline Phosphatase 126 55 - 135 U/L    AST 66 (H) 10 - 40 U/L    ALT 59 (H) 10 - 44 U/L    eGFR 6.5 (A) >60 mL/min/1.73 m^2    Anion Gap 18 (H) 8 - 16 mmol/L   Lipase   Result Value Ref Range    Lipase 46 4 - 60 U/L   Acetone   Result Value Ref Range    Acetone, Bld Negative  Negative   Acetone, serum   Result Value Ref Range    Acetone, Bld Negative Negative   Basic metabolic panel   Result Value Ref Range    Sodium 134 (L) 136 - 145 mmol/L    Potassium 5.8 (H) 3.5 - 5.1 mmol/L    Chloride 99 95 - 110 mmol/L    CO2 22 (L) 23 - 29 mmol/L    Glucose 408 (H) 70 - 110 mg/dL    BUN 58 (H) 6 - 20 mg/dL    Creatinine 7.4 (H) 0.5 - 1.4 mg/dL    Calcium 9.3 8.7 - 10.5 mg/dL    Anion Gap 13 8 - 16 mmol/L    eGFR 6.8 (A) >60 mL/min/1.73 m^2   POCT glucose   Result Value Ref Range    POCT Glucose >500 (HH) 70 - 110 mg/dL   POCT glucose   Result Value Ref Range    POCT Glucose >500 (HH) 70 - 110 mg/dL   POCT glucose   Result Value Ref Range    POCT Glucose 356 (H) 70 - 110 mg/dL     Imaging Results              CT Renal Stone Study ABD Pelvis WO (Final result)  Result time 08/20/24 09:03:43      Final result by Thahn Davila MD (08/20/24 09:03:43)                   Impression:      No evidence of nephrolithiasis, hydronephrosis or hydroureter.    Bladder wall thickening which is nonspecific and evidence of bilateral renal atrophy.    Cardiomegaly anasarca and evidence of prior cholecystectomy are again noted.    Stable left periaortic enlarged lymph node.    Limited evaluation of the colon and parenchyma.    Right adnexal mass which is chronic and could represent a uterine fibroid but was not confidently seen on prior ultrasound.    Bibasal atelectasis      Electronically signed by: Thanh Davila MD  Date:    08/20/2024  Time:    09:03               Narrative:    EXAMINATION:  CT RENAL STONE STUDY ABD PELVIS WO    CLINICAL HISTORY:  Flank pain, kidney stone suspected;left;    TECHNIQUE:  Low dose axial images, sagittal and coronal reformations were obtained from the lung bases to the pubic symphysis.  Contrast was not administered.    COMPARISON:  July 16, 2024    FINDINGS:  Mild bibasal atelectasis is evident.  There is moderate-sized pericardial effusion.  There is evidence of cardiomegaly  and anasarca.  Extensive vascular calcifications are present.  There is evidence of prior cholecystectomy.  There is evidence of renal atrophy bilaterally.  There is an enlarged left periaortic lymph node measuring 13 mm in small stone mentions.  This is nonspecific but is similar to the prior examination.  There is a right adnexal mass which is chronic and may represent a ovarian mass or uterine fibroid.  This area measures 5 x 3.7 cm and is unchanged relative to 2023 a few sigmoid diverticula are seen.  I do not see CT evidence of diverticulitis.  There is no evidence of nephrolithiasis, hydronephrosis or hydroureter.  There is no evidence of bowel obstruction.                                       X-Ray Chest AP Portable (Final result)  Result time 08/20/24 08:46:12      Final result by Varun Trejo MD (08/20/24 08:46:12)                   Impression:      Cardiomegaly.      Electronically signed by: Varun Trejo  Date:    08/20/2024  Time:    08:46               Narrative:    EXAMINATION:  XR CHEST AP PORTABLE    CLINICAL HISTORY:  Cough, unspecified    TECHNIQUE:  Portable view of the chest was performed.    COMPARISON:  07/18/2024.    FINDINGS:  Lungs are clear.  No focal consolidation.  Heart size is enlarged.  Mediastinal contours unremarkable.  Trachea midline.    Bony thorax intact.                                     ED Physician's independent review of the above imaging: agree with radiologist    The emergency physician reviewed the vital signs / test results outlined above.     ED Discussion      Patient had very early DKA with anion gap solved during ED observation and patient discharged to continue insulin and restrict diet, PCP follow up.     Patient's evaluation in the ED does not suggest any emergent or life-threatening medical conditions requiring immediate intervention beyond what was provided in the ED, and I believe patient is safe for discharge. Regardless, an unremarkable evaluation  in the ED does not preclude the development or presence of a serious or life-threatening condition. As such, patient was given return instructions for any change or worsening of symptoms.       ED Medication(s) Administered:  Medications   HYDROmorphone injection 1 mg (1 mg Intravenous Given 8/20/24 0814)   haloperidol lactate injection 2 mg (2 mg Intravenous Given 8/20/24 0817)   lactated ringers bolus 1,000 mL (0 mLs Intravenous Stopped 8/20/24 1154)   insulin regular injection 6 Units 0.06 mL (6 Units Intravenous Given 8/20/24 1003)   sodium zirconium cyclosilicate packet 10 g (10 g Oral Given 8/20/24 1201)       Prescription Management: I performed a review of the patient's current Rx medication list as input by nursing staff.    Discharge Medication List as of 8/20/2024 11:47 AM        CONTINUE these medications which have NOT CHANGED    Details   acetaZOLAMIDE (DIAMOX) 250 MG tablet take 1 tablet by mouth 3 times a day, Starting Fri 5/31/2024, Normal      albuterol (VENTOLIN HFA) 90 mcg/actuation inhaler Inhale 2 puffs every 4 hours by inhalation route., Starting Mon 6/24/2024, Normal      apixaban (ELIQUIS) 5 mg Tab Take 1 tablet (5 mg total) by mouth 2 (two) times daily. Patient w/ thrombocytopenia <50, so held during admission, follow-up with hematologist about restarting, Starting Wed 2/28/2024, Normal      atropine 1% (ISOPTO ATROPINE) 1 % Drop Place 1 drop into the left eye 2 (two) times a day., Starting Thu 3/14/2024, Normal      blood sugar diagnostic (TRUE METRIX GLUCOSE TEST STRIP) Strp Use 1 strip to check blood glucose three times daily, Starting Thu 10/5/2023, Normal      blood-glucose meter (TRUE METRIX GLUCOSE METER) Misc Use to check blood glucose, Starting Thu 10/5/2023, Normal      blood-glucose meter,continuous (DEXCOM ) Misc USE WITH SENSORS, Starting Wed 2/28/2024, Normal      !! blood-glucose sensor (DEXCOM G7 SENSOR) Heather Use as directed for CGM. Change sensor every 10 days,  Starting Thu 7/25/2024, Normal      !! blood-glucose sensor Heather CHANGE EVERY 10 DAYS, Starting Wed 2/28/2024, Normal      brimonidine 0.15 % OPTH DROP (ALPHAGAN) 0.15 % ophthalmic solution Place 1 drop into both eyes 3 (three) times daily., Starting Wed 2/28/2024, Until Thu 2/27/2025, Normal      brinzolamide (AZOPT) 1 % ophthalmic suspension Place1 drop in left eye 3 times a day for 30 days, Starting Fri 5/31/2024, Normal      busPIRone (BUSPAR) 10 MG tablet Take 1 tablet (10 mg total) by mouth 3 (three) times daily as needed (anxiety)., Starting Wed 2/28/2024, Until Thu 2/27/2025 at 2359, Normal      cetirizine (ZYRTEC) 10 MG tablet Take 1 tablet every day by oral route., Starting Wed 2/21/2024, Normal      dorzolamide-timolol 2-0.5% (COSOPT) 22.3-6.8 mg/mL ophthalmic solution place 1 drop in left eye twice a day  for 30 days, Starting Tue 4/9/2024, Normal      FLUoxetine 40 MG capsule Take 1 capsule every day by oral route., Starting Mon 6/24/2024, Normal      fluticasone propionate (FLONASE ALLERGY RELIEF) 50 mcg/actuation nasal spray [place] 1 spray every day by intranasal route., Starting Mon 12/11/2023, Normal      hydrALAZINE (APRESOLINE) 25 MG tablet take 1 tablet by mouth every 8 hours, Starting Tue 4/9/2024, Normal      insulin glargine U-100, Lantus, (LANTUS SOLOSTAR U-100 INSULIN) 100 unit/mL (3 mL) InPn pen Inject 22 units every day by subcutaneous route in the evening for 30 days, for diabetes., Starting Tue 8/6/2024, Normal      insulin glargine U-100, Lantus, 100 unit/mL injection Inject 13 Units into the skin 2 (two) times a day., Starting Tue 7/23/2024, No Print      !! lancets (TRUEPLUS LANCETS) 33 gauge Misc Use 1 to check blood glucose three times daily, Starting Thu 10/5/2023, Normal      !! lancets 33 gauge Misc Use to test twice daily, Starting Mon 6/12/2023, Normal      levETIRAcetam (KEPPRA) 500 MG Tab Take 1 tablet (500 mg total) by mouth 2 (two) times daily., Starting Wed 2/28/2024, Until  "Thu 2/27/2025, Normal      LORazepam (ATIVAN) 0.5 MG tablet Take 1 tablet 3 times a day by oral route for 7 days, for severe panic., Starting Mon 6/24/2024, Normal      ondansetron (ZOFRAN-ODT) 4 MG TbDL Place 1 tablet (4 mg) on or under the tongue every 8 hours for 10 days., Starting Wed 2/21/2024, Normal      oxyCODONE-acetaminophen (PERCOCET)  mg per tablet Take 1 tablet by mouth every 4 (four) hours as needed for Pain., Starting Wed 2/28/2024, Normal      pen needle, diabetic 31 gauge x 3/16" Ndle Use as directed with insulin once daily, Starting Wed 2/28/2024, Normal      prednisoLONE acetate (PRED FORTE) 1 % DrpS Place 1 drop into the left eye 2 (two) times daily., Starting Thu 3/14/2024, Normal      predniSONE (DELTASONE) 20 MG tablet take 3 tablets Oral Daily,x30 day(s), Starting Tue 4/9/2024, Normal      promethazine (PHENERGAN) 25 MG tablet Take 1 tablet (25 mg total) by mouth every 6 (six) hours as needed., Starting Mon 8/5/2024, Normal      sevelamer carbonate (RENVELA) 800 mg Tab Take 1 tablet (800 mg total) by mouth 3 (three) times daily with meals., Starting Wed 2/28/2024, Normal      sucralfate (CARAFATE) 1 gram tablet Take 1 tablet (1 g total) by mouth 4 (four) times daily., Starting Mon 8/5/2024, Normal      timolol maleate 0.5% (TIMOPTIC) 0.5 % Drop Place 1 drop in left eye 2 times a day for 30 days, Starting Fri 5/31/2024, Normal      gabapentin (NEURONTIN) 300 MG capsule Take 2 capsules twice a day by oral route for 90 days., Starting Mon 7/10/2023, Normal      pantoprazole (PROTONIX) 40 MG tablet Take 1 tablet (40 mg total) by mouth once daily., Starting Mon 8/5/2024, Until Thu 9/12/2024, Normal       !! - Potential duplicate medications found. Please discuss with provider.            Follow-up Information       Melony Kim NP. Schedule an appointment as soon as possible for a visit in 1 day.    Specialty: Family Medicine  Contact information:  [address] [address]" [id number]  [phone]               [facility], [place], operated by [facility].    Specialty: Emergency Medicine  Why: As needed, If symptoms worsen  Contact information:  [address] [facility] [address] [phone]                          Clinical Impression       ICD-10-CM ICD-9-CM   1. Severe hyperglycemia due to diabetes mellitus  E11.65 250.00   2. Cough  R05.9 786.2   3. High anion gap metabolic acidosis  E87.29 276.2   4. Gastroparesis  K31.84 536.3      ED Disposition Condition    Discharge Stable                Akin Flores MD  08/26/24 5617

## 2024-08-22 ENCOUNTER — HOSPITAL ENCOUNTER (EMERGENCY)
Facility: HOSPITAL | Age: 34
Discharge: HOME | End: 2024-08-23
Attending: EMERGENCY MEDICINE
Payer: MEDICAID

## 2024-08-22 ENCOUNTER — HOSPITAL ENCOUNTER (EMERGENCY)
Facility: HOSPITAL | Age: 34
Discharge: HOME | End: 2024-08-22
Attending: STUDENT IN AN ORGANIZED HEALTH CARE EDUCATION/TRAINING PROGRAM
Payer: MEDICAID

## 2024-08-22 VITALS
OXYGEN SATURATION: 100 % | WEIGHT: 117 LBS | OXYGEN SATURATION: 100 % | DIASTOLIC BLOOD PRESSURE: 66 MMHG | HEIGHT: 62 IN | HEART RATE: 81 BPM | HEIGHT: 62 IN | TEMPERATURE: 99 F | SYSTOLIC BLOOD PRESSURE: 175 MMHG | RESPIRATION RATE: 20 BRPM | RESPIRATION RATE: 22 BRPM | TEMPERATURE: 100 F | HEART RATE: 89 BPM | BODY MASS INDEX: 21.53 KG/M2 | SYSTOLIC BLOOD PRESSURE: 143 MMHG | WEIGHT: 117 LBS | DIASTOLIC BLOOD PRESSURE: 96 MMHG | BODY MASS INDEX: 21.53 KG/M2

## 2024-08-22 DIAGNOSIS — R73.9 HYPERGLYCEMIA: Primary | ICD-10-CM

## 2024-08-22 DIAGNOSIS — R05.9 COUGH: ICD-10-CM

## 2024-08-22 DIAGNOSIS — M54.9 BACK PAIN, ACUTE: ICD-10-CM

## 2024-08-22 DIAGNOSIS — E11.65 SEVERE HYPERGLYCEMIA DUE TO DIABETES MELLITUS: ICD-10-CM

## 2024-08-22 DIAGNOSIS — R10.9 LEFT FLANK PAIN: Primary | ICD-10-CM

## 2024-08-22 DIAGNOSIS — I10 HYPERTENSION, UNSPECIFIED TYPE: ICD-10-CM

## 2024-08-22 DIAGNOSIS — K59.00 CONSTIPATION, UNSPECIFIED CONSTIPATION TYPE: ICD-10-CM

## 2024-08-22 DIAGNOSIS — E16.2 HYPOGLYCEMIA: ICD-10-CM

## 2024-08-22 LAB
ACETONE BLD-MCNC: NEGATIVE MG/DL
ACETONE BLD-MCNC: NEGATIVE MG/DL
ALBUMIN SERPL BCP-MCNC: 3.4 G/DL (ref 3.5–5.2)
ALBUMIN SERPL BCP-MCNC: 3.6 G/DL (ref 3.5–5.2)
ALLENS TEST: ABNORMAL
ALP SERPL-CCNC: 134 U/L (ref 55–135)
ALP SERPL-CCNC: 137 U/L (ref 55–135)
ALT SERPL W/O P-5'-P-CCNC: 53 U/L (ref 10–44)
ALT SERPL W/O P-5'-P-CCNC: 53 U/L (ref 10–44)
ANION GAP SERPL CALC-SCNC: 19 MMOL/L (ref 8–16)
ANION GAP SERPL CALC-SCNC: 20 MMOL/L (ref 8–16)
AST SERPL-CCNC: 20 U/L (ref 10–40)
AST SERPL-CCNC: 25 U/L (ref 10–40)
BASOPHILS # BLD AUTO: 0.01 K/UL (ref 0–0.2)
BASOPHILS # BLD AUTO: 0.03 K/UL (ref 0–0.2)
BASOPHILS NFR BLD: 0.2 % (ref 0–1.9)
BASOPHILS NFR BLD: 0.3 % (ref 0–1.9)
BILIRUB SERPL-MCNC: 1.3 MG/DL (ref 0.1–1)
BILIRUB SERPL-MCNC: 2 MG/DL (ref 0.1–1)
BUN SERPL-MCNC: 14 MG/DL (ref 6–20)
BUN SERPL-MCNC: 30 MG/DL (ref 6–20)
CALCIUM SERPL-MCNC: 9.3 MG/DL (ref 8.7–10.5)
CALCIUM SERPL-MCNC: 9.4 MG/DL (ref 8.7–10.5)
CHLORIDE SERPL-SCNC: 92 MMOL/L (ref 95–110)
CHLORIDE SERPL-SCNC: 95 MMOL/L (ref 95–110)
CO2 SERPL-SCNC: 23 MMOL/L (ref 23–29)
CO2 SERPL-SCNC: 26 MMOL/L (ref 23–29)
CREAT SERPL-MCNC: 2.6 MG/DL (ref 0.5–1.4)
CREAT SERPL-MCNC: 4.7 MG/DL (ref 0.5–1.4)
DELSYS: ABNORMAL
DIFFERENTIAL METHOD BLD: ABNORMAL
DIFFERENTIAL METHOD BLD: ABNORMAL
EOSINOPHIL # BLD AUTO: 0 K/UL (ref 0–0.5)
EOSINOPHIL # BLD AUTO: 0.1 K/UL (ref 0–0.5)
EOSINOPHIL NFR BLD: 0.2 % (ref 0–8)
EOSINOPHIL NFR BLD: 0.4 % (ref 0–8)
ERYTHROCYTE [DISTWIDTH] IN BLOOD BY AUTOMATED COUNT: 18.6 % (ref 11.5–14.5)
ERYTHROCYTE [DISTWIDTH] IN BLOOD BY AUTOMATED COUNT: 19 % (ref 11.5–14.5)
EST. GFR  (NO RACE VARIABLE): 11.8 ML/MIN/1.73 M^2
EST. GFR  (NO RACE VARIABLE): 23.9 ML/MIN/1.73 M^2
GLUCOSE SERPL-MCNC: 48 MG/DL (ref 70–110)
GLUCOSE SERPL-MCNC: 851 MG/DL (ref 70–110)
HCO3 UR-SCNC: 27.2 MMOL/L (ref 24–28)
HCT VFR BLD AUTO: 31.5 % (ref 37–48.5)
HCT VFR BLD AUTO: 37.7 % (ref 37–48.5)
HGB BLD-MCNC: 10.5 G/DL (ref 12–16)
HGB BLD-MCNC: 12.6 G/DL (ref 12–16)
IMM GRANULOCYTES # BLD AUTO: 0.06 K/UL (ref 0–0.04)
IMM GRANULOCYTES # BLD AUTO: 0.09 K/UL (ref 0–0.04)
IMM GRANULOCYTES NFR BLD AUTO: 0.8 % (ref 0–0.5)
IMM GRANULOCYTES NFR BLD AUTO: 1 % (ref 0–0.5)
LIPASE SERPL-CCNC: 15 U/L (ref 4–60)
LYMPHOCYTES # BLD AUTO: 0.4 K/UL (ref 1–4.8)
LYMPHOCYTES # BLD AUTO: 1.7 K/UL (ref 1–4.8)
LYMPHOCYTES NFR BLD: 14.4 % (ref 18–48)
LYMPHOCYTES NFR BLD: 5.6 % (ref 18–48)
MAGNESIUM SERPL-MCNC: 1.6 MG/DL (ref 1.6–2.6)
MAGNESIUM SERPL-MCNC: 1.9 MG/DL (ref 1.6–2.6)
MCH RBC QN AUTO: 29.6 PG (ref 27–31)
MCH RBC QN AUTO: 29.7 PG (ref 27–31)
MCHC RBC AUTO-ENTMCNC: 33.3 G/DL (ref 32–36)
MCHC RBC AUTO-ENTMCNC: 33.4 G/DL (ref 32–36)
MCV RBC AUTO: 89 FL (ref 82–98)
MCV RBC AUTO: 89 FL (ref 82–98)
MONOCYTES # BLD AUTO: 0.3 K/UL (ref 0.3–1)
MONOCYTES # BLD AUTO: 0.7 K/UL (ref 0.3–1)
MONOCYTES NFR BLD: 5.4 % (ref 4–15)
MONOCYTES NFR BLD: 6.4 % (ref 4–15)
NEUTROPHILS # BLD AUTO: 5.5 K/UL (ref 1.8–7.7)
NEUTROPHILS # BLD AUTO: 9 K/UL (ref 1.8–7.7)
NEUTROPHILS NFR BLD: 77.7 % (ref 38–73)
NEUTROPHILS NFR BLD: 87.6 % (ref 38–73)
NRBC BLD-RTO: 0 /100 WBC
NRBC BLD-RTO: 0 /100 WBC
PCO2 BLDA: 37.3 MMHG (ref 35–45)
PH SMN: 7.47 [PH] (ref 7.35–7.45)
PHOSPHATE SERPL-MCNC: 2.3 MG/DL (ref 2.7–4.5)
PLATELET # BLD AUTO: 113 K/UL (ref 150–450)
PLATELET # BLD AUTO: 166 K/UL (ref 150–450)
PMV BLD AUTO: 10.4 FL (ref 9.2–12.9)
PMV BLD AUTO: 10.8 FL (ref 9.2–12.9)
PO2 BLDA: 40 MMHG (ref 40–60)
POC BE: 4 MMOL/L
POC SATURATED O2: 79 % (ref 95–100)
POC TCO2: 28 MMOL/L (ref 24–29)
POCT GLUCOSE: 118 MG/DL (ref 70–110)
POCT GLUCOSE: 289 MG/DL (ref 70–110)
POCT GLUCOSE: 65 MG/DL (ref 70–110)
POCT GLUCOSE: 88 MG/DL (ref 70–110)
POCT GLUCOSE: >500 MG/DL (ref 70–110)
POCT GLUCOSE: >500 MG/DL (ref 70–110)
POTASSIUM SERPL-SCNC: 3.9 MMOL/L (ref 3.5–5.1)
POTASSIUM SERPL-SCNC: 4.9 MMOL/L (ref 3.5–5.1)
PROT SERPL-MCNC: 7 G/DL (ref 6–8.4)
PROT SERPL-MCNC: 7.5 G/DL (ref 6–8.4)
RBC # BLD AUTO: 3.54 M/UL (ref 4–5.4)
RBC # BLD AUTO: 4.25 M/UL (ref 4–5.4)
SAMPLE: ABNORMAL
SITE: ABNORMAL
SODIUM SERPL-SCNC: 134 MMOL/L (ref 136–145)
SODIUM SERPL-SCNC: 141 MMOL/L (ref 136–145)
WBC # BLD AUTO: 11.56 K/UL (ref 3.9–12.7)
WBC # BLD AUTO: 6.28 K/UL (ref 3.9–12.7)

## 2024-08-22 PROCEDURE — 96374 THER/PROPH/DIAG INJ IV PUSH: CPT

## 2024-08-22 PROCEDURE — 82962 GLUCOSE BLOOD TEST: CPT

## 2024-08-22 PROCEDURE — 63600175 PHARM REV CODE 636 W HCPCS: Performed by: EMERGENCY MEDICINE

## 2024-08-22 PROCEDURE — 74176 CT ABD & PELVIS W/O CONTRAST: CPT | Mod: TC

## 2024-08-22 PROCEDURE — 25000003 PHARM REV CODE 250: Performed by: EMERGENCY MEDICINE

## 2024-08-22 PROCEDURE — 74176 CT ABD & PELVIS W/O CONTRAST: CPT | Mod: 26,,, | Performed by: RADIOLOGY

## 2024-08-22 PROCEDURE — 99285 EMERGENCY DEPT VISIT HI MDM: CPT | Mod: 25

## 2024-08-22 PROCEDURE — 82009 KETONE BODYS QUAL: CPT | Performed by: EMERGENCY MEDICINE

## 2024-08-22 PROCEDURE — 85025 COMPLETE CBC W/AUTO DIFF WBC: CPT | Mod: 91 | Performed by: EMERGENCY MEDICINE

## 2024-08-22 PROCEDURE — 71045 X-RAY EXAM CHEST 1 VIEW: CPT | Mod: 26,,, | Performed by: RADIOLOGY

## 2024-08-22 PROCEDURE — 63600175 PHARM REV CODE 636 W HCPCS: Performed by: STUDENT IN AN ORGANIZED HEALTH CARE EDUCATION/TRAINING PROGRAM

## 2024-08-22 PROCEDURE — 94761 N-INVAS EAR/PLS OXIMETRY MLT: CPT | Mod: XB

## 2024-08-22 PROCEDURE — 80053 COMPREHEN METABOLIC PANEL: CPT | Mod: 91 | Performed by: EMERGENCY MEDICINE

## 2024-08-22 PROCEDURE — 36415 COLL VENOUS BLD VENIPUNCTURE: CPT | Performed by: EMERGENCY MEDICINE

## 2024-08-22 PROCEDURE — 99900035 HC TECH TIME PER 15 MIN (STAT)

## 2024-08-22 PROCEDURE — 96375 TX/PRO/DX INJ NEW DRUG ADDON: CPT

## 2024-08-22 PROCEDURE — 83735 ASSAY OF MAGNESIUM: CPT | Performed by: EMERGENCY MEDICINE

## 2024-08-22 PROCEDURE — 82803 BLOOD GASES ANY COMBINATION: CPT

## 2024-08-22 PROCEDURE — 71045 X-RAY EXAM CHEST 1 VIEW: CPT | Mod: TC

## 2024-08-22 PROCEDURE — 85025 COMPLETE CBC W/AUTO DIFF WBC: CPT | Performed by: EMERGENCY MEDICINE

## 2024-08-22 PROCEDURE — 83735 ASSAY OF MAGNESIUM: CPT | Mod: 91 | Performed by: EMERGENCY MEDICINE

## 2024-08-22 PROCEDURE — 82009 KETONE BODYS QUAL: CPT | Mod: 91 | Performed by: EMERGENCY MEDICINE

## 2024-08-22 PROCEDURE — 83690 ASSAY OF LIPASE: CPT | Performed by: EMERGENCY MEDICINE

## 2024-08-22 PROCEDURE — 80053 COMPREHEN METABOLIC PANEL: CPT | Performed by: EMERGENCY MEDICINE

## 2024-08-22 PROCEDURE — 96376 TX/PRO/DX INJ SAME DRUG ADON: CPT

## 2024-08-22 PROCEDURE — 99291 CRITICAL CARE FIRST HOUR: CPT | Mod: 25

## 2024-08-22 PROCEDURE — 84100 ASSAY OF PHOSPHORUS: CPT | Performed by: EMERGENCY MEDICINE

## 2024-08-22 RX ORDER — HYDRALAZINE HYDROCHLORIDE 20 MG/ML
10 INJECTION INTRAMUSCULAR; INTRAVENOUS
Status: COMPLETED | OUTPATIENT
Start: 2024-08-22 | End: 2024-08-22

## 2024-08-22 RX ORDER — MORPHINE SULFATE 2 MG/ML
4 INJECTION, SOLUTION INTRAMUSCULAR; INTRAVENOUS
Status: COMPLETED | OUTPATIENT
Start: 2024-08-22 | End: 2024-08-22

## 2024-08-22 RX ORDER — HALOPERIDOL 5 MG/ML
2.5 INJECTION INTRAMUSCULAR
Status: COMPLETED | OUTPATIENT
Start: 2024-08-22 | End: 2024-08-22

## 2024-08-22 RX ORDER — HYDROMORPHONE HYDROCHLORIDE 1 MG/ML
0.5 INJECTION, SOLUTION INTRAMUSCULAR; INTRAVENOUS; SUBCUTANEOUS
Status: COMPLETED | OUTPATIENT
Start: 2024-08-22 | End: 2024-08-22

## 2024-08-22 RX ORDER — HALOPERIDOL 5 MG/ML
5 INJECTION INTRAMUSCULAR
Status: COMPLETED | OUTPATIENT
Start: 2024-08-22 | End: 2024-08-22

## 2024-08-22 RX ORDER — CLONIDINE HYDROCHLORIDE 0.1 MG/1
0.2 TABLET ORAL
Status: DISCONTINUED | OUTPATIENT
Start: 2024-08-22 | End: 2024-08-22 | Stop reason: HOSPADM

## 2024-08-22 RX ORDER — DEXTROSE 50 % IN WATER (D50W) INTRAVENOUS SYRINGE
12.5
Status: COMPLETED | OUTPATIENT
Start: 2024-08-22 | End: 2024-08-22

## 2024-08-22 RX ORDER — HYDROMORPHONE HYDROCHLORIDE 1 MG/ML
1 INJECTION, SOLUTION INTRAMUSCULAR; INTRAVENOUS; SUBCUTANEOUS
Status: COMPLETED | OUTPATIENT
Start: 2024-08-22 | End: 2024-08-22

## 2024-08-22 RX ORDER — ONDANSETRON HYDROCHLORIDE 2 MG/ML
4 INJECTION, SOLUTION INTRAVENOUS
Status: COMPLETED | OUTPATIENT
Start: 2024-08-22 | End: 2024-08-22

## 2024-08-22 RX ADMIN — DEXTROSE MONOHYDRATE 12.5 G: 25 INJECTION, SOLUTION INTRAVENOUS at 07:08

## 2024-08-22 RX ADMIN — HYDROMORPHONE HYDROCHLORIDE 0.5 MG: 1 INJECTION, SOLUTION INTRAMUSCULAR; INTRAVENOUS; SUBCUTANEOUS at 07:08

## 2024-08-22 RX ADMIN — ONDANSETRON 4 MG: 2 INJECTION INTRAMUSCULAR; INTRAVENOUS at 06:08

## 2024-08-22 RX ADMIN — HYDRALAZINE HYDROCHLORIDE 10 MG: 20 INJECTION, SOLUTION INTRAMUSCULAR; INTRAVENOUS at 06:08

## 2024-08-22 RX ADMIN — HYDROMORPHONE HYDROCHLORIDE 1 MG: 1 INJECTION, SOLUTION INTRAMUSCULAR; INTRAVENOUS; SUBCUTANEOUS at 08:08

## 2024-08-22 RX ADMIN — HUMAN INSULIN 10 UNITS: 100 INJECTION, SOLUTION SUBCUTANEOUS at 07:08

## 2024-08-22 RX ADMIN — HYDROMORPHONE HYDROCHLORIDE 0.5 MG: 1 INJECTION, SOLUTION INTRAMUSCULAR; INTRAVENOUS; SUBCUTANEOUS at 06:08

## 2024-08-22 RX ADMIN — HALOPERIDOL LACTATE 2.5 MG: 5 INJECTION, SOLUTION INTRAMUSCULAR at 07:08

## 2024-08-22 RX ADMIN — HUMAN INSULIN 10 UNITS: 100 INJECTION, SOLUTION SUBCUTANEOUS at 08:08

## 2024-08-22 RX ADMIN — SODIUM CHLORIDE, POTASSIUM CHLORIDE, SODIUM LACTATE AND CALCIUM CHLORIDE 500 ML: 600; 310; 30; 20 INJECTION, SOLUTION INTRAVENOUS at 07:08

## 2024-08-22 RX ADMIN — MORPHINE SULFATE 4 MG: 2 INJECTION, SOLUTION INTRAMUSCULAR; INTRAVENOUS at 07:08

## 2024-08-22 RX ADMIN — HALOPERIDOL LACTATE 5 MG: 5 INJECTION, SOLUTION INTRAMUSCULAR at 06:08

## 2024-08-22 NOTE — ED PROVIDER NOTES
Encounter Date: 2024       History     Chief Complaint   Patient presents with    Flank Pain     Bilateral flank pain.  On dialysis.  4L taken off.  Dialysis last done Tuesday and Wednesday.      34-year-old female with history below includes medication regimen noncompliance, hypertension, gastroparesis, end-stage renal disease, anuric on hemodialysis, last dialysis was yesterday, scheduled for dialysis today in [place].   Presents to ED with chief complaints of left flank pain with onset upon waking up this morning.   She denies associated shortness of breath, chest pain, palpitations, fever, chills, recent changes in bowel habits.     Of note,  she was seen here 2 days ago with the same complaint and had a negative CT scan of the abdomen/pelvis.    The history is provided by the patient. No  was used.     Review of patient's allergies indicates:   Allergen Reactions    Droperidol Hives    Penicillins Hives     Past Medical History:   Diagnosis Date    ESRD on hemodialysis 2022    Gastritis 2022    EGD was 21    Gastroparesis 2022    has not had Emptying study    Heart failure with preserved ejection fraction 2022    EF 55% on 3/22    History of supraventricular tachycardia     Hyperkalemia 2022    Hypertensive emergency 2022    Sickle cell trait 2022    Type 2 diabetes mellitus      Past Surgical History:   Procedure Laterality Date     SECTION      x 3    COLONOSCOPY      COLONOSCOPY N/A 2022    Procedure: COLONOSCOPY;  Surgeon: Jagedep Cedeno MD;  Location: [facility];  Service: Endoscopy;  Laterality: N/A;    DESTRUCTION, CILIARY BODY, USING LASER Left 3/8/2024    Procedure: Cyclophotocoagulation G-probe, retrobulbar chlorpromazine left eye;  Surgeon: Fidel Wise MD;  Location: [address];  Service: Ophthalmology;  Laterality: Left;    ENDOSCOPIC ULTRASOUND OF UPPER GASTROINTESTINAL TRACT N/A 2023    Procedure:  ULTRASOUND, UPPER GI TRACT, ENDOSCOPIC;  Surgeon: Micky Paredes III, MD;  Location: [facility] ENDO;  Service: Endoscopy;  Laterality: N/A;    ESOPHAGOGASTRODUODENOSCOPY N/A 10/18/2019    Procedure: ESOPHAGOGASTRODUODENOSCOPY (EGD);  Surgeon: Gianluca Mendez MD;  Location: [facility] ENDO;  Service: Endoscopy;  Laterality: N/A;    ESOPHAGOGASTRODUODENOSCOPY N/A 08/24/2022    Procedure: EGD (ESOPHAGOGASTRODUODENOSCOPY);  Surgeon: Micky Paredes III, MD;  Location: [facility] ENDO;  Service: Endoscopy;  Laterality: N/A;    ESOPHAGOGASTRODUODENOSCOPY N/A 12/5/2022    Procedure: EGD (ESOPHAGOGASTRODUODENOSCOPY);  Surgeon: Marcelo Zhong MD;  Location: [facility] ENDO;  Service: Endoscopy;  Laterality: N/A;    INSERTION, CATHETER, TUNNELED N/A 6/17/2023    Procedure: Insertion,catheter,tunneled;  Surgeon: Carlos Thurman Jr., MD;  Location: [facility] OR;  Service: General;  Laterality: N/A;    LAPAROSCOPIC CHOLECYSTECTOMY N/A 07/30/2022    Procedure: CHOLECYSTECTOMY, LAPAROSCOPIC;  Surgeon: Grey Perez MD;  Location: [facility] OR;  Service: General;  Laterality: N/A;    PLACEMENT OF DUAL-LUMEN VASCULAR CATHETER Left 07/12/2022    Procedure: INSERTION-CATHETER-JOSEPH;  Surgeon: Dionte Gan MD;  Location: [facility] OR;  Service: General;  Laterality: Left;    PLACEMENT OF DUAL-LUMEN VASCULAR CATHETER Right 07/26/2022    Procedure: INSERTION-CATHETER-Hemosplit;  Surgeon: Dionte Gan MD;  Location: [facility] OR;  Service: General;  Laterality: Right;     Family History   Problem Relation Name Age of Onset    Diabetes Mother      Diabetes Father       Social History     Tobacco Use    Smoking status: Never    Smokeless tobacco: Never   Substance Use Topics    Alcohol use: Not Currently    Drug use: No     Review of Systems   Constitutional: Negative.    HENT: Negative.     Eyes: Negative.    Respiratory: Negative.     Cardiovascular: Negative.    Gastrointestinal:  Positive for abdominal pain.   Endocrine: Negative.    Genitourinary:  Negative.    Musculoskeletal: Negative.    Skin: Negative.    Allergic/Immunologic: Negative.    Neurological: Negative.    Hematological: Negative.    Psychiatric/Behavioral: Negative.     All other systems reviewed and are negative.      Physical Exam     Initial Vitals [08/22/24 0550]   BP Pulse Resp Temp SpO2   (!) 204/102 101 (!) 24 99.6 °F (37.6 °C) (!) 93 %      MAP       --         Physical Exam    Nursing note and vitals reviewed.  Constitutional: She appears well-developed and well-nourished.   Grimacing, will not sit still   HENT:   Head: Normocephalic.   Eyes: Pupils are equal, round, and reactive to light.   Neck:   Normal range of motion.  Cardiovascular:  Normal rate.           Pulmonary/Chest: Breath sounds normal.   Abdominal: Abdomen is soft. She exhibits no distension. There is no abdominal tenderness. There is no rebound.   Musculoskeletal:      Cervical back: Normal range of motion.     Neurological: She is alert and oriented to person, place, and time. She has normal strength. GCS score is 15. GCS eye subscore is 4. GCS verbal subscore is 5. GCS motor subscore is 6.   Skin: Capillary refill takes less than 2 seconds.   Psychiatric: She has a normal mood and affect.         ED Course   Critical Care    Date/Time: 8/22/2024 9:19 AM    Performed by: Luis Alfredo Fournier MD  Authorized by: Luis Alfredo Fournier MD  Direct patient critical care time: 20 minutes  Additional history critical care time: 5 minutes  Ordering / reviewing critical care time: 2 minutes  Documentation critical care time: 2 minutes  Other critical care time: 10 minutes  Total critical care time (exclusive of procedural time) : 39 minutes  Critical care time was exclusive of separately billable procedures and treating other patients.  Critical care was necessary to treat or prevent imminent or life-threatening deterioration of the following conditions: metabolic crisis.  Critical care was time spent personally by me on the  following activities: development of treatment plan with patient or surrogate, blood draw for specimens, interpretation of cardiac output measurements, evaluation of patient's response to treatment, examination of patient, obtaining history from patient or surrogate, ordering and performing treatments and interventions, ordering and review of laboratory studies, ordering and review of radiographic studies, pulse oximetry, re-evaluation of patient's condition and review of old charts.        Labs Reviewed   CBC W/ AUTO DIFFERENTIAL - Abnormal       Result Value    WBC 6.28      RBC 3.54 (*)     Hemoglobin 10.5 (*)     Hematocrit 31.5 (*)     MCV 89      MCH 29.7      MCHC 33.3      RDW 18.6 (*)     Platelets 113 (*)     MPV 10.8      Immature Granulocytes 1.0 (*)     Gran # (ANC) 5.5      Immature Grans (Abs) 0.06 (*)     Lymph # 0.4 (*)     Mono # 0.3      Eos # 0.0      Baso # 0.01      nRBC 0      Gran % 87.6 (*)     Lymph % 5.6 (*)     Mono % 5.4      Eosinophil % 0.2      Basophil % 0.2      Differential Method Automated     COMPREHENSIVE METABOLIC PANEL - Abnormal    Sodium 134 (*)     Potassium 4.9      Chloride 92 (*)     CO2 23      Glucose 851 (*)     BUN 30 (*)     Creatinine 4.7 (*)     Calcium 9.3      Total Protein 7.0      Albumin 3.4 (*)     Total Bilirubin 1.3 (*)     Alkaline Phosphatase 137 (*)     AST 20      ALT 53 (*)     eGFR 11.8 (*)     Anion Gap 19 (*)     Narrative:     glucose critical result(s) called and verbal readback obtained from   NILS Queen RN by [facility] 08/22/2024 06:47   POCT GLUCOSE - Abnormal    POCT Glucose >500 (*)    ISTAT PROCEDURE - Abnormal    POC PH 7.471 (*)     POC PCO2 37.3      POC PO2 40      POC HCO3 27.2      POC BE 4 (*)     POC SATURATED O2 79      POC TCO2 28      Sample VENOUS      Site Other      Allens Test N/A      DelSys Room Air     POCT GLUCOSE - Abnormal    POCT Glucose >500 (*)    POCT GLUCOSE - Abnormal    POCT Glucose 289 (*)    MAGNESIUM    Magnesium 1.9      ACETONE    Acetone, Bld Negative     POCT GLUCOSE MONITORING CONTINUOUS   POCT GLUCOSE MONITORING CONTINUOUS          Imaging Results              X-Ray Chest AP Portable (Final result)  Result time 08/22/24 06:34:54      Final result by Varun Trejo MD (08/22/24 06:34:54)                   Impression:      Cardiomegaly.      Electronically signed by: Varun Trejo  Date:    08/22/2024  Time:    06:34               Narrative:    EXAMINATION:  XR CHEST AP PORTABLE    CLINICAL HISTORY:  Dorsalgia, unspecified    TECHNIQUE:  Portable view of the chest was performed.    COMPARISON:  08/20/2024.    FINDINGS:  Lungs are clear.  No focal consolidation.  Heart size is enlarged.  Mediastinal contours unremarkable.  Trachea midline.    Bony thorax intact.                                       Medications   cloNIDine tablet 0.2 mg (0.2 mg Oral Not Given 8/22/24 0730)   ondansetron injection 4 mg (4 mg Intravenous Given 8/22/24 0607)   HYDROmorphone injection 0.5 mg (0.5 mg Intravenous Given 8/22/24 0621)   hydrALAZINE injection 10 mg (10 mg Intravenous Given 8/22/24 0621)   insulin regular injection 10 Units 0.1 mL (10 Units Intravenous Given 8/22/24 0706)   lactated ringers bolus 500 mL (500 mLs Intravenous New Bag 8/22/24 0711)   HYDROmorphone injection 0.5 mg (0.5 mg Intravenous Given 8/22/24 0713)   haloperidol lactate injection 2.5 mg (2.5 mg Intravenous Given 8/22/24 0718)   insulin regular injection 10 Units 0.1 mL (10 Units Intravenous Given 8/22/24 0809)     Medical Decision Making  Ddx hyperglycemia, DHS/HHS, kidney stone, others.  History of medication noncompliance  CT scan done 2 days ago shows no kidney stones  Glucose 800s, Acetone negative, PH 7.47, not in DKA.  Chest x-ray shows cardiomegaly, no pulmonary edema  Rest of CBC, CMP similar to patient's baseline  Received gentle hydration as well as insulin IVP, pain meds, antihypertensives, anxiolytics in the ED  Patient was re-evaluated, she is much  comfortable resting, sleeping in the ED bed repeat glucose 289  Patient was instructed to follow up with PCP, nephrologist for scheduled dialysis today, and was given strict return precautions to the ED. The patient voiced understanding and agreed with the plan      Amount and/or Complexity of Data Reviewed  Radiology: ordered.    Risk  OTC drugs.  Prescription drug management.                                      Clinical Impression:  Final diagnoses:  [M54.9] Back pain, acute  [R05.9] Cough  [R73.9] Hyperglycemia (Primary)  [I10] Hypertension, unspecified type  [E11.65] Severe hyperglycemia due to diabetes mellitus          ED Disposition Condition    Discharge Stable          ED Prescriptions    None       Follow-up Information       Follow up With Specialties Details Why Contact Info    Melony Kim NP Family Medicine  As needed [address] [facility] [address]  [phone]      [facility] Emergency Dept Emergency Medicine  As needed [address] [facility] [id number]  [phone]             Luis Alfredo Fournier MD  08/22/24 3409

## 2024-08-22 NOTE — RESPIRATORY THERAPY
Latest Reference Range & Units 08/22/24 07:04   POC PH 7.35 - 7.45  7.471 (H)   POC PCO2 35 - 45 mmHg 37.3   POC PO2 40 - 60 mmHg 40   POC HCO3 24 - 28 mmol/L 27.2   POC SATURATED O2 95 - 100 % 79   Sample  VENOUS   POC TCO2 24 - 29 mmol/L 28   POC BE -2 to 2 mmol/L 4 (H)   [facility]  Room Air   Site  Other   (H): Data is abnormally high  Results reported to Dr. Fournier.

## 2024-08-22 NOTE — ED NOTES
Pt here for bilateral flank pain that awoke pt out of sleep.  Pt states that she does have history of sciatic nerve pain.  Pt writhing in bed during triage.  Pt also states that she had dialysis the past 2 days and had a total of 4L removed.  Pt also states that she is supposed to go back to dialysis today.

## 2024-08-22 NOTE — ED PROVIDER NOTES
Encounter Date: 2024       History     Chief Complaint   Patient presents with    Flank Pain     Pt. C/o flank pain.      34-year-old female, history of end-stage renal disease, on dialysis, also insulin-dependent diabetic, gastroparesis, etc., here from home via EMS for evaluation and treatment of left flank pain.  Patient was seen here 2 days ago, then again this morning.  Two days ago she underwent a CT which showed no evidence of ureteral calculus, no masses in the left flank region, etc..  On both occasions patient's blood pressure has been elevated and glucose has been elevated.  She was due for dialysis today, and she states that she went for full session.  She states her left flank began hurting again while at dialysis.  She does not make urine.  States she has a normal bowel movement yesterday.  No nausea or vomiting at this time.      Review of patient's allergies indicates:   Allergen Reactions    Droperidol Hives    Penicillins Hives     Past Medical History:   Diagnosis Date    ESRD on hemodialysis 2022    Gastritis 2022    EGD was 21    Gastroparesis 2022    has not had Emptying study    Heart failure with preserved ejection fraction 2022    EF 55% on 3/22    History of supraventricular tachycardia     Hyperkalemia 2022    Hypertensive emergency 2022    Sickle cell trait 2022    Type 2 diabetes mellitus      Past Surgical History:   Procedure Laterality Date     SECTION      x 3    COLONOSCOPY      COLONOSCOPY N/A 2022    Procedure: COLONOSCOPY;  Surgeon: Jagdeep Cedeno MD;  Location: [facility];  Service: Endoscopy;  Laterality: N/A;    DESTRUCTION, CILIARY BODY, USING LASER Left 3/8/2024    Procedure: Cyclophotocoagulation G-probe, retrobulbar chlorpromazine left eye;  Surgeon: Fidel Wise MD;  Location: [address];  Service: Ophthalmology;  Laterality: Left;    ENDOSCOPIC ULTRASOUND OF UPPER GASTROINTESTINAL TRACT N/A 2023     Procedure: ULTRASOUND, UPPER GI TRACT, ENDOSCOPIC;  Surgeon: Micky Paredes III, MD;  Location: [facility] ENDO;  Service: Endoscopy;  Laterality: N/A;    ESOPHAGOGASTRODUODENOSCOPY N/A 10/18/2019    Procedure: ESOPHAGOGASTRODUODENOSCOPY (EGD);  Surgeon: Gianluca Mendez MD;  Location: [facility] ENDO;  Service: Endoscopy;  Laterality: N/A;    ESOPHAGOGASTRODUODENOSCOPY N/A 08/24/2022    Procedure: EGD (ESOPHAGOGASTRODUODENOSCOPY);  Surgeon: Micky Paredes III, MD;  Location: [facility];  Service: Endoscopy;  Laterality: N/A;    ESOPHAGOGASTRODUODENOSCOPY N/A 12/5/2022    Procedure: EGD (ESOPHAGOGASTRODUODENOSCOPY);  Surgeon: Marcelo Zhong MD;  Location: [facility] ENDO;  Service: Endoscopy;  Laterality: N/A;    INSERTION, CATHETER, TUNNELED N/A 6/17/2023    Procedure: Insertion,catheter,tunneled;  Surgeon: Carlos Thurman Jr., MD;  Location: [facility] OR;  Service: General;  Laterality: N/A;    LAPAROSCOPIC CHOLECYSTECTOMY N/A 07/30/2022    Procedure: CHOLECYSTECTOMY, LAPAROSCOPIC;  Surgeon: Grey Perez MD;  Location: [facility] OR;  Service: General;  Laterality: N/A;    PLACEMENT OF DUAL-LUMEN VASCULAR CATHETER Left 07/12/2022    Procedure: INSERTION-CATHETER-JOSEPH;  Surgeon: Dionte Gan MD;  Location: [facility] OR;  Service: General;  Laterality: Left;    PLACEMENT OF DUAL-LUMEN VASCULAR CATHETER Right 07/26/2022    Procedure: INSERTION-CATHETER-Hemosplit;  Surgeon: Dionte Gan MD;  Location: [facility] OR;  Service: General;  Laterality: Right;     Family History   Problem Relation Name Age of Onset    Diabetes Mother      Diabetes Father       Social History     Tobacco Use    Smoking status: Never    Smokeless tobacco: Never   Substance Use Topics    Alcohol use: Not Currently    Drug use: No     Review of Systems   Constitutional:  Negative for chills and fever.   HENT:  Negative for congestion, rhinorrhea and sore throat.    Eyes:  Negative for visual disturbance.   Respiratory:  Negative for cough, shortness  of breath and wheezing.    Cardiovascular:  Negative for chest pain and palpitations.   Gastrointestinal:  Positive for abdominal pain (Left, radiating from left flank) and nausea. Negative for blood in stool, constipation, diarrhea and vomiting.   Endocrine: Negative for polydipsia and polyuria.   Genitourinary:  Positive for flank pain.   Musculoskeletal:  Negative for arthralgias, back pain, myalgias, neck pain and neck stiffness.   Skin:  Negative for pallor and rash.   Neurological:  Negative for dizziness, seizures, syncope, weakness, light-headedness, numbness and headaches.   Psychiatric/Behavioral:  The patient is nervous/anxious.    All other systems reviewed and are negative.      Physical Exam     Initial Vitals [08/22/24 1801]   BP Pulse Resp Temp SpO2   (!) 187/107 101 19 98.8 °F (37.1 °C) 98 %      MAP       --         Physical Exam    Nursing note and vitals reviewed.  Constitutional: She appears well-developed and well-nourished. She is not diaphoretic. She appears distressed (Writhing in apparent pain, moaning).   HENT:   Head: Normocephalic and atraumatic.   Nose: Nose normal.   Mouth/Throat: Oropharynx is clear and moist. No oropharyngeal exudate.   Eyes: Conjunctivae and EOM are normal. Pupils are equal, round, and reactive to light. No scleral icterus.   Neck: Neck supple. No JVD present.   Normal range of motion.  Cardiovascular:  Normal rate, regular rhythm, normal heart sounds and intact distal pulses.           No murmur heard.  Pulmonary/Chest: Breath sounds normal. No stridor. No respiratory distress. She has no rhonchi. She has no rales.   Abdominal: Abdomen is soft. Bowel sounds are normal. She exhibits no distension. There is no abdominal tenderness.   Musculoskeletal:         General: No tenderness or edema. Normal range of motion.      Cervical back: Normal range of motion and neck supple.     Neurological: She is alert and oriented to person, place, and time. She has normal  strength. No cranial nerve deficit or sensory deficit. GCS score is 15. GCS eye subscore is 4. GCS verbal subscore is 5. GCS motor subscore is 6.   Skin: Skin is warm and dry. Capillary refill takes less than 2 seconds. No rash noted. No erythema.   Psychiatric:   Very anxious, sobbing         ED Course   Procedures  Labs Reviewed   CBC W/ AUTO DIFFERENTIAL - Abnormal       Result Value    WBC 11.56      RBC 4.25      Hemoglobin 12.6      Hematocrit 37.7      MCV 89      MCH 29.6      MCHC 33.4      RDW 19.0 (*)     Platelets 166      MPV 10.4      Immature Granulocytes 0.8 (*)     Gran # (ANC) 9.0 (*)     Immature Grans (Abs) 0.09 (*)     Lymph # 1.7      Mono # 0.7      Eos # 0.1      Baso # 0.03      nRBC 0      Gran % 77.7 (*)     Lymph % 14.4 (*)     Mono % 6.4      Eosinophil % 0.4      Basophil % 0.3      Differential Method Automated     COMPREHENSIVE METABOLIC PANEL - Abnormal    Sodium 141      Potassium 3.9      Chloride 95      CO2 26      Glucose 48 (*)     BUN 14      Creatinine 2.6 (*)     Calcium 9.4      Total Protein 7.5      Albumin 3.6      Total Bilirubin 2.0 (*)     Alkaline Phosphatase 134      AST 25      ALT 53 (*)     eGFR 23.9 (*)     Anion Gap 20 (*)     Narrative:        critical result(s) called and verbal readback obtained from   Allyson LINTON, ED by AML2 08/22/2024 19:13   PHOSPHORUS - Abnormal    Phosphorus 2.3 (*)    POCT GLUCOSE - Abnormal    POCT Glucose 65 (*)    POCT GLUCOSE - Abnormal    POCT Glucose 118 (*)    MAGNESIUM    Magnesium 1.6     ACETONE    Acetone, Bld Negative     LIPASE    Lipase 15     POCT GLUCOSE    POCT Glucose 88     POCT GLUCOSE MONITORING CONTINUOUS   POCT GLUCOSE MONITORING CONTINUOUS          Imaging Results              CT Abdomen Pelvis  Without Contrast (Final result)  Result time 08/22/24 20:35:11      Final result by Kaushik Diego MD (08/22/24 20:35:11)                   Impression:      Atrophic bilateral native kidneys.  No evidence of  hydronephrosis.    Mild nonspecific urinary bladder wall thickening.  Correlation with urinalysis advised.    Moderate to large volume pericardial effusion.  Cardiomegaly.    Mildly prominent fluid-filled loops of small bowel without discrete transition point which may reflect a nonspecific enteritis.    Mild hepatosplenomegaly.    Retained stool throughout the colon suggesting constipation.    Additional findings as above.      Electronically signed by: Kaushik Diego MD  Date:    08/22/2024  Time:    20:35               Narrative:    EXAMINATION:  CT ABDOMEN PELVIS WITHOUT CONTRAST    CLINICAL HISTORY:  Abdominal pain, acute, nonlocalized;    TECHNIQUE:  Low dose axial images, sagittal and coronal reformations were obtained from the lung bases to the pubic symphysis without IV contrast.  Oral contrast was not administered.    COMPARISON:  CT 08/20/2024    FINDINGS:  There is mild mosaic alteration of the visualized lung parenchyma.  There are scattered bandlike opacities suggesting atelectasis or scarring.  No significant pleural fluid.  Heart is enlarged.  There is a moderate to large pericardial effusion.    Liver is mildly enlarged.  No focal hepatic abnormality on this limited noncontrast study.  The gallbladder is surgically absent.  There is no intra-or extrahepatic biliary ductal dilatation.    Stomach appears within normal limits.  Pancreas and adrenal glands are unremarkable for noncontrast technique.  Spleen is mildly enlarged, similar to prior examination.    Bilateral native kidneys are atrophic with prominent vascular calcifications.  There is no significant hydronephrosis.  Urinary bladder demonstrates nonspecific circumferential wall thickening.  Noncontrast appearance of the uterus is unremarkable.  Stable appearance of the bilateral adnexa.  No significant free fluid in the pelvis.    The abdominal aorta is normal in course and caliber extensive calcification of the abdominal aorta and visceral  branch vessels.  There is no retroperitoneal hematoma.    There are mildly prominent fluid-filled loops of small bowel throughout the abdomen and pelvis without discrete transition point which may reflect a nonspecific enteritis.  There is a moderate volume of retained stool throughout the colon suggesting constipation.  No CT evidence of acute appendicitis.  There is no ascites, portal venous gas, or free intraperitoneal air.  There is a small fat containing umbilical hernia.  There are scattered shotty mesenteric lymph nodes.  There are mildly prominent retroperitoneal lymph nodes, similar to prior study.    Osseous structures demonstrate no acute fracture.  There is mild diffuse body wall edema.  There is a somewhat more focal region of soft tissue induration within the left paramidline lower abdominal wall possibly a prior injection site or hematoma.  Clinical correlation with physical examination advised.                                    X-Rays:   Independently Interpreted Readings:   Other Readings:  CT abdomen pelvis, personally reviewed by me, shows enlarged heart with moderate pericardial effusion, enlarged liver, gallbladder surgically absent, no ductal dilatation.  Stomach appears normal, pancreas and adrenals unremarkable.  Spleen mildly enlarged, similar to previous examinations.  Kidneys are atrophic with prominent vascular calcifications.  No hydronephrosis.  Urinary bladder slightly thickened, uterus unremarkable, no obvious adnexal masses.  No free fluid in the pelvis.  Abdominal aorta shows extensive calcifications, no retroperitoneal hematomas.  Mildly prominent fluid-filled loops of bowel throughout the abdomen and pelvis, no discrete transition point.  May represent nonspecific enteritis.  Moderate volume of retained stool suggesting constipation.  No evidence for appendicitis.  No free air.  Small fat containing umbilical hernia.  Not live prominent retroperitoneal lymph nodes, similar to  previous studies.  No evidence for fractures.    Medications   haloperidol lactate injection 5 mg (5 mg Intravenous Given 8/22/24 1831)   morphine injection 4 mg (4 mg Intravenous Given 8/22/24 1945)   dextrose 50 % in water (D50W) injection 12.5 g (12.5 g Intravenous Given 8/22/24 1945)   HYDROmorphone injection 1 mg (1 mg Intravenous Given 8/22/24 2032)     Medical Decision Making  Patient was lab work unremarkable except for initial hypoglycemia.  She was given IV glucose, and has been able to maintain her glucose since then.  She had complained of nausea not being able to eat or take her medicines, but she was not vomited here.  Will discharge home with Phenergan.  She will follow-up with her doctors tomorrow, and return as needed or if worse in any way.    Amount and/or Complexity of Data Reviewed  Labs: ordered.  Radiology: ordered.    Risk  Prescription drug management.                                      Clinical Impression:  Final diagnoses:  [R10.9] Left flank pain (Primary)  [K59.00] Constipation, unspecified constipation type  [E16.2] Hypoglycemia          ED Disposition Condition    Discharge Stable          ED Prescriptions    None       Follow-up Information       Follow up With Specialties Details Why Contact Info    Melony Kim NP Family Medicine Call today  [address] [facility] [id number] [phone]      [facility] Emergency Dept Emergency Medicine  As needed, If symptoms worsen [address] [facility] [address] [phone]             Ad Berman MD  08/23/24 0043

## 2024-08-22 NOTE — ED NOTES
Patient rocking from side to side on gurney crying . Patient relays severe bilateral flank pain . Seen here 2 days ago for the same.   Glucose resulted >500 .

## 2024-08-23 NOTE — ED NOTES
Observed Pt sticking several fingers from their R hand down their throat in attempts to make themself vomit. Pt instructed that is not appropriate to do. MD made aware.

## 2024-08-23 NOTE — ED NOTES
Spoke with father Garcia that patient is ready for discharge. Father states he will come and provide transportation for home.

## 2024-08-23 NOTE — DISCHARGE INSTRUCTIONS
Take Phenergan as prescribed for nausea, and continue all of your other medications.  Your CT suggests that you might be mildly constipated.  Take over-the-counter stool softeners appropriately.  Follow-up with your doctors later today and return here as needed or if worse in any way.

## 2024-08-24 ENCOUNTER — HOSPITAL ENCOUNTER (EMERGENCY)
Facility: HOSPITAL | Age: 34
Discharge: HOME | End: 2024-08-24
Attending: STUDENT IN AN ORGANIZED HEALTH CARE EDUCATION/TRAINING PROGRAM
Payer: MEDICAID

## 2024-08-24 ENCOUNTER — HOSPITAL ENCOUNTER (INPATIENT)
Facility: HOSPITAL | Age: 34
LOS: 2 days | Discharge: HOME | DRG: 637 | End: 2024-08-26
Attending: STUDENT IN AN ORGANIZED HEALTH CARE EDUCATION/TRAINING PROGRAM | Admitting: HOSPITALIST
Payer: MEDICAID

## 2024-08-24 VITALS
TEMPERATURE: 98 F | RESPIRATION RATE: 12 BRPM | HEART RATE: 86 BPM | WEIGHT: 117 LBS | SYSTOLIC BLOOD PRESSURE: 151 MMHG | DIASTOLIC BLOOD PRESSURE: 86 MMHG | BODY MASS INDEX: 21.53 KG/M2 | HEIGHT: 62 IN | OXYGEN SATURATION: 99 %

## 2024-08-24 DIAGNOSIS — F19.20 DEPENDENCY ON PAIN MEDICATION: ICD-10-CM

## 2024-08-24 DIAGNOSIS — R73.9 HYPERGLYCEMIA: ICD-10-CM

## 2024-08-24 DIAGNOSIS — N18.6 ESRD (END STAGE RENAL DISEASE): ICD-10-CM

## 2024-08-24 DIAGNOSIS — R10.9 LEFT FLANK PAIN: ICD-10-CM

## 2024-08-24 DIAGNOSIS — R11.2 NAUSEA AND VOMITING, UNSPECIFIED VOMITING TYPE: ICD-10-CM

## 2024-08-24 DIAGNOSIS — I51.7 CARDIOMEGALY: Primary | ICD-10-CM

## 2024-08-24 DIAGNOSIS — R10.9 ABDOMINAL PAIN, UNSPECIFIED ABDOMINAL LOCATION: ICD-10-CM

## 2024-08-24 DIAGNOSIS — E87.20 ACIDOSIS: ICD-10-CM

## 2024-08-24 DIAGNOSIS — E10.10 DKA, TYPE 1: Primary | ICD-10-CM

## 2024-08-24 LAB
ACETONE BLD-MCNC: ABNORMAL MG/DL
ACETONE BLD-MCNC: NEGATIVE MG/DL
ALBUMIN SERPL BCP-MCNC: 3.7 G/DL (ref 3.5–5.2)
ALBUMIN SERPL BCP-MCNC: 4.9 G/DL (ref 3.5–5.2)
ALLENS TEST: ABNORMAL
ALP SERPL-CCNC: 124 U/L (ref 55–135)
ALP SERPL-CCNC: 129 U/L (ref 55–135)
ALT SERPL W/O P-5'-P-CCNC: 36 U/L (ref 10–44)
ALT SERPL W/O P-5'-P-CCNC: 38 U/L (ref 10–44)
ANION GAP SERPL CALC-SCNC: 28 MMOL/L (ref 8–16)
ANION GAP SERPL CALC-SCNC: 30 MMOL/L (ref 8–16)
AST SERPL-CCNC: 17 U/L (ref 10–40)
AST SERPL-CCNC: 21 U/L (ref 10–40)
B-OH-BUTYR BLD STRIP-SCNC: 3.7 MMOL/L (ref 0–0.5)
BASOPHILS # BLD AUTO: 0.02 K/UL (ref 0–0.2)
BASOPHILS # BLD AUTO: 0.03 K/UL (ref 0–0.2)
BASOPHILS NFR BLD: 0.2 % (ref 0–1.9)
BASOPHILS NFR BLD: 0.2 % (ref 0–1.9)
BILIRUB SERPL-MCNC: 2.1 MG/DL (ref 0.1–1)
BILIRUB SERPL-MCNC: 2.6 MG/DL (ref 0.1–1)
BUN SERPL-MCNC: 19 MG/DL (ref 6–20)
BUN SERPL-MCNC: 45 MG/DL (ref 6–20)
CALCIUM SERPL-MCNC: 9.5 MG/DL (ref 8.7–10.5)
CALCIUM SERPL-MCNC: 9.9 MG/DL (ref 8.7–10.5)
CHLORIDE SERPL-SCNC: 88 MMOL/L (ref 95–110)
CHLORIDE SERPL-SCNC: 90 MMOL/L (ref 95–110)
CO2 SERPL-SCNC: 19 MMOL/L (ref 23–29)
CO2 SERPL-SCNC: 21 MMOL/L (ref 23–29)
CREAT SERPL-MCNC: 3 MG/DL (ref 0.5–1.4)
CREAT SERPL-MCNC: 6.3 MG/DL (ref 0.5–1.4)
DELSYS: ABNORMAL
DIFFERENTIAL METHOD BLD: ABNORMAL
DIFFERENTIAL METHOD BLD: ABNORMAL
EOSINOPHIL # BLD AUTO: 0 K/UL (ref 0–0.5)
EOSINOPHIL # BLD AUTO: 0.1 K/UL (ref 0–0.5)
EOSINOPHIL NFR BLD: 0.4 % (ref 0–8)
EOSINOPHIL NFR BLD: 0.8 % (ref 0–8)
ERYTHROCYTE [DISTWIDTH] IN BLOOD BY AUTOMATED COUNT: 18.4 % (ref 11.5–14.5)
ERYTHROCYTE [DISTWIDTH] IN BLOOD BY AUTOMATED COUNT: 18.9 % (ref 11.5–14.5)
EST. GFR  (NO RACE VARIABLE): 20.2 ML/MIN/1.73 M^2
EST. GFR  (NO RACE VARIABLE): 8.3 ML/MIN/1.73 M^2
GLUCOSE SERPL-MCNC: 327 MG/DL (ref 70–110)
GLUCOSE SERPL-MCNC: 336 MG/DL (ref 70–110)
GLUCOSE SERPL-MCNC: 339 MG/DL (ref 70–110)
HCG INTACT+B SERPL-ACNC: 1.67 MIU/ML
HCO3 UR-SCNC: 22.8 MMOL/L (ref 24–28)
HCT VFR BLD AUTO: 33.6 % (ref 37–48.5)
HCT VFR BLD AUTO: 36.8 % (ref 37–48.5)
HGB BLD-MCNC: 11.2 G/DL (ref 12–16)
HGB BLD-MCNC: 12.5 G/DL (ref 12–16)
IMM GRANULOCYTES # BLD AUTO: 0.11 K/UL (ref 0–0.04)
IMM GRANULOCYTES # BLD AUTO: 0.14 K/UL (ref 0–0.04)
IMM GRANULOCYTES NFR BLD AUTO: 0.9 % (ref 0–0.5)
IMM GRANULOCYTES NFR BLD AUTO: 1.7 % (ref 0–0.5)
LIPASE SERPL-CCNC: 18 U/L (ref 4–60)
LIPASE SERPL-CCNC: 20 U/L (ref 4–60)
LYMPHOCYTES # BLD AUTO: 1 K/UL (ref 1–4.8)
LYMPHOCYTES # BLD AUTO: 1 K/UL (ref 1–4.8)
LYMPHOCYTES NFR BLD: 12 % (ref 18–48)
LYMPHOCYTES NFR BLD: 8.1 % (ref 18–48)
MCH RBC QN AUTO: 29.1 PG (ref 27–31)
MCH RBC QN AUTO: 30.1 PG (ref 27–31)
MCHC RBC AUTO-ENTMCNC: 33.3 G/DL (ref 32–36)
MCHC RBC AUTO-ENTMCNC: 34 G/DL (ref 32–36)
MCV RBC AUTO: 87 FL (ref 82–98)
MCV RBC AUTO: 89 FL (ref 82–98)
MODE: ABNORMAL
MONOCYTES # BLD AUTO: 0.8 K/UL (ref 0.3–1)
MONOCYTES # BLD AUTO: 1 K/UL (ref 0.3–1)
MONOCYTES NFR BLD: 7.7 % (ref 4–15)
MONOCYTES NFR BLD: 9.5 % (ref 4–15)
NEUTROPHILS # BLD AUTO: 10.2 K/UL (ref 1.8–7.7)
NEUTROPHILS # BLD AUTO: 6.3 K/UL (ref 1.8–7.7)
NEUTROPHILS NFR BLD: 76.2 % (ref 38–73)
NEUTROPHILS NFR BLD: 82.3 % (ref 38–73)
NRBC BLD-RTO: 0 /100 WBC
NRBC BLD-RTO: 0 /100 WBC
PCO2 BLDA: 34.7 MMHG (ref 35–45)
PH SMN: 7.42 [PH] (ref 7.35–7.45)
PLATELET # BLD AUTO: 150 K/UL (ref 150–450)
PLATELET # BLD AUTO: 180 K/UL (ref 150–450)
PMV BLD AUTO: 10.2 FL (ref 9.2–12.9)
PMV BLD AUTO: 11.1 FL (ref 9.2–12.9)
PO2 BLDA: 52 MMHG (ref 40–60)
POC BE: -2 MMOL/L
POC SATURATED O2: 88 % (ref 95–100)
POC TCO2: 24 MMOL/L (ref 24–29)
POCT GLUCOSE: 286 MG/DL (ref 70–110)
POCT GLUCOSE: 354 MG/DL (ref 70–110)
POTASSIUM SERPL-SCNC: 3.3 MMOL/L (ref 3.5–5.1)
POTASSIUM SERPL-SCNC: 4.2 MMOL/L (ref 3.5–5.1)
PROT SERPL-MCNC: 7.3 G/DL (ref 6–8.4)
PROT SERPL-MCNC: 8.7 G/DL (ref 6–8.4)
RBC # BLD AUTO: 3.85 M/UL (ref 4–5.4)
RBC # BLD AUTO: 4.15 M/UL (ref 4–5.4)
SAMPLE: ABNORMAL
SITE: ABNORMAL
SODIUM SERPL-SCNC: 137 MMOL/L (ref 136–145)
SODIUM SERPL-SCNC: 139 MMOL/L (ref 136–145)
WBC # BLD AUTO: 12.4 K/UL (ref 3.9–12.7)
WBC # BLD AUTO: 8.24 K/UL (ref 3.9–12.7)

## 2024-08-24 PROCEDURE — 63600175 PHARM REV CODE 636 W HCPCS: Performed by: STUDENT IN AN ORGANIZED HEALTH CARE EDUCATION/TRAINING PROGRAM

## 2024-08-24 PROCEDURE — 25000003 PHARM REV CODE 250: Performed by: STUDENT IN AN ORGANIZED HEALTH CARE EDUCATION/TRAINING PROGRAM

## 2024-08-24 PROCEDURE — 84702 CHORIONIC GONADOTROPIN TEST: CPT | Performed by: STUDENT IN AN ORGANIZED HEALTH CARE EDUCATION/TRAINING PROGRAM

## 2024-08-24 PROCEDURE — 80053 COMPREHEN METABOLIC PANEL: CPT | Performed by: STUDENT IN AN ORGANIZED HEALTH CARE EDUCATION/TRAINING PROGRAM

## 2024-08-24 PROCEDURE — 82962 GLUCOSE BLOOD TEST: CPT

## 2024-08-24 PROCEDURE — 96375 TX/PRO/DX INJ NEW DRUG ADDON: CPT

## 2024-08-24 PROCEDURE — 80053 COMPREHEN METABOLIC PANEL: CPT | Mod: 91 | Performed by: STUDENT IN AN ORGANIZED HEALTH CARE EDUCATION/TRAINING PROGRAM

## 2024-08-24 PROCEDURE — 85025 COMPLETE CBC W/AUTO DIFF WBC: CPT | Performed by: STUDENT IN AN ORGANIZED HEALTH CARE EDUCATION/TRAINING PROGRAM

## 2024-08-24 PROCEDURE — 99900035 HC TECH TIME PER 15 MIN (STAT)

## 2024-08-24 PROCEDURE — 83690 ASSAY OF LIPASE: CPT | Mod: 91 | Performed by: STUDENT IN AN ORGANIZED HEALTH CARE EDUCATION/TRAINING PROGRAM

## 2024-08-24 PROCEDURE — 82010 KETONE BODYS QUAN: CPT | Performed by: STUDENT IN AN ORGANIZED HEALTH CARE EDUCATION/TRAINING PROGRAM

## 2024-08-24 PROCEDURE — 83690 ASSAY OF LIPASE: CPT | Performed by: STUDENT IN AN ORGANIZED HEALTH CARE EDUCATION/TRAINING PROGRAM

## 2024-08-24 PROCEDURE — 94761 N-INVAS EAR/PLS OXIMETRY MLT: CPT

## 2024-08-24 PROCEDURE — 96376 TX/PRO/DX INJ SAME DRUG ADON: CPT

## 2024-08-24 PROCEDURE — 96365 THER/PROPH/DIAG IV INF INIT: CPT

## 2024-08-24 PROCEDURE — 36415 COLL VENOUS BLD VENIPUNCTURE: CPT | Performed by: STUDENT IN AN ORGANIZED HEALTH CARE EDUCATION/TRAINING PROGRAM

## 2024-08-24 PROCEDURE — 20000000 HC ICU ROOM

## 2024-08-24 PROCEDURE — 82009 KETONE BODYS QUAL: CPT | Performed by: STUDENT IN AN ORGANIZED HEALTH CARE EDUCATION/TRAINING PROGRAM

## 2024-08-24 PROCEDURE — 82803 BLOOD GASES ANY COMBINATION: CPT

## 2024-08-24 PROCEDURE — 99285 EMERGENCY DEPT VISIT HI MDM: CPT

## 2024-08-24 PROCEDURE — 85025 COMPLETE CBC W/AUTO DIFF WBC: CPT | Mod: 91 | Performed by: STUDENT IN AN ORGANIZED HEALTH CARE EDUCATION/TRAINING PROGRAM

## 2024-08-24 PROCEDURE — 99284 EMERGENCY DEPT VISIT MOD MDM: CPT | Mod: 25

## 2024-08-24 RX ORDER — HYDROMORPHONE HYDROCHLORIDE 1 MG/ML
0.5 INJECTION, SOLUTION INTRAMUSCULAR; INTRAVENOUS; SUBCUTANEOUS
Status: COMPLETED | OUTPATIENT
Start: 2024-08-24 | End: 2024-08-24

## 2024-08-24 RX ORDER — MORPHINE SULFATE 2 MG/ML
4 INJECTION, SOLUTION INTRAMUSCULAR; INTRAVENOUS
Status: COMPLETED | OUTPATIENT
Start: 2024-08-24 | End: 2024-08-24

## 2024-08-24 RX ORDER — INSULIN GLARGINE 100 [IU]/ML
22 INJECTION, SOLUTION SUBCUTANEOUS ONCE
Status: COMPLETED | OUTPATIENT
Start: 2024-08-25 | End: 2024-08-25

## 2024-08-24 RX ORDER — HYDROMORPHONE HYDROCHLORIDE 1 MG/ML
1 INJECTION, SOLUTION INTRAMUSCULAR; INTRAVENOUS; SUBCUTANEOUS
Status: COMPLETED | OUTPATIENT
Start: 2024-08-24 | End: 2024-08-24

## 2024-08-24 RX ORDER — FAMOTIDINE 10 MG/ML
20 INJECTION INTRAVENOUS
Status: COMPLETED | OUTPATIENT
Start: 2024-08-24 | End: 2024-08-24

## 2024-08-24 RX ORDER — PROCHLORPERAZINE EDISYLATE 5 MG/ML
5 INJECTION INTRAMUSCULAR; INTRAVENOUS
Status: COMPLETED | OUTPATIENT
Start: 2024-08-24 | End: 2024-08-24

## 2024-08-24 RX ADMIN — PROMETHAZINE HYDROCHLORIDE 12.5 MG: 25 INJECTION INTRAMUSCULAR; INTRAVENOUS at 07:08

## 2024-08-24 RX ADMIN — PROCHLORPERAZINE EDISYLATE 5 MG: 5 INJECTION INTRAMUSCULAR; INTRAVENOUS at 09:08

## 2024-08-24 RX ADMIN — FAMOTIDINE 20 MG: 10 INJECTION, SOLUTION INTRAVENOUS at 09:08

## 2024-08-24 RX ADMIN — HUMAN INSULIN 5 UNITS: 100 INJECTION, SOLUTION SUBCUTANEOUS at 08:08

## 2024-08-24 RX ADMIN — HYDROMORPHONE HYDROCHLORIDE 1 MG: 1 INJECTION, SOLUTION INTRAMUSCULAR; INTRAVENOUS; SUBCUTANEOUS at 08:08

## 2024-08-24 RX ADMIN — HYDROMORPHONE HYDROCHLORIDE 0.5 MG: 0.5 INJECTION, SOLUTION INTRAMUSCULAR; INTRAVENOUS; SUBCUTANEOUS at 09:08

## 2024-08-24 RX ADMIN — MORPHINE SULFATE 4 MG: 2 INJECTION, SOLUTION INTRAMUSCULAR; INTRAVENOUS at 07:08

## 2024-08-24 RX ADMIN — HYDROMORPHONE HYDROCHLORIDE 0.5 MG: 0.5 INJECTION, SOLUTION INTRAMUSCULAR; INTRAVENOUS; SUBCUTANEOUS at 11:08

## 2024-08-24 NOTE — Clinical Note
Diagnosis: DKA, type 1 [634664]   Future Attending Provider: SHIRLEY BENNETT [[id number]]   Admit to which facility:: Psychiatric hospital [[id number]]   Reason for IP Medical Treatment  (Clinical interventions that can only be accomplished in the IP setting? ) :: insulin gtt, fully anticipate > 2 MNs reasonable hospital care   Plans for Post-Acute care--if anticipated (pick the single best option):: A. No post acute care anticipated at this time

## 2024-08-24 NOTE — ED NOTES
See triage notes.     Pain:  Rated 10/10.     Psychosocial:  Patient is calm and cooperative.  Patients insight and judgement are appropriate to situation.  Appears clean, well maintained, with clothing appropriate to environment.  No evidence of delusions, hallucinations, or psychosis.     Neuro:  Eyes open spontaneously.  Awake, alert, oriented x 4.  Speech clear and appropriate.  Tolerating saliva secretions well.  Able to follow commands, demonstrating ability to actively and appropriately communicate within context of current conversation.  Symmetrical facial muscles.  Moving all extremities well with no noted weakness.  Adequate muscle tone present.  Movement is purposeful.         Airway:  Bilateral chest rise and fall.  RR regular and non-labored.         Circulatory:  Skin warm, dry, and pink.  Radial pulses strong and regular.  Capillary refill/skin blanching less than 3 seconds to distal of upper extremities.  SR on the CM without noted ectopy.     Abdomen:  Abdomen soft and non-distended.  Nausea.  Vomited x 2 prior to arrival.       Urinary:  Does not void.     Extremities:  No redness, heat, swelling, deformity, or pain.     Skin:  Intact with no bruising/discolorations noted.

## 2024-08-24 NOTE — ED NOTES
Medicare Wellness Visit  Plan for Preventive Care    A good way for you to stay healthy is to use preventive care.  Medicare covers many services that can help you stay healthy.* The goal of these services is to find any health problems as quickly as possible. Finding problems early can help make them easier to treat.  Your personal plan below lists the services you may need and when they are due.     Health Maintenance Summary     Diabetes Eye Exam (Yearly)  Overdue - never done    Diabetes Foot Exam (Yearly)  Overdue - never done    Medicare Advantage- Medicare Wellness Visit (Yearly - January to December)  Due since 1/1/2022    Diabetes A1C (Every 6 Months)  Ordered on 3/14/2022    Depression Screening (Yearly)  Due soon on 9/13/2022    DM/CKD GFR (Yearly)  Ordered on 3/14/2022    DTaP/Tdap/Td Vaccine (2 - Td or Tdap)  Next due on 4/4/2024    Pneumococcal Vaccine 65+   Completed    Shingles Vaccine   Completed    COVID-19 Vaccine   Completed    Influenza Vaccine   Completed    Hepatitis B Vaccine   Aged Out    Meningococcal Vaccine   Aged Out    HPV Vaccine   Aged Out           Preventive Care for Women and Men    Heart Screenings (Cardiovascular):  · Blood tests are used to check your cholesterol, lipid and triglyceride levels. High levels can increase your risk for heart disease and stroke. High levels can be treated with medications, diet and exercise. Lowering your levels can help keep your heart and blood vessels healthy.  Your provider will order these tests if they are needed.    · An ultrasound is done to see if you have an abdominal aortic aneurysm (AAA).  This is an enlargement of one of the main blood vessels that delivers blood to the body.   In the [place], 9,000 deaths are caused by AAA.  You may not even know you have this problem and as many as 1 in 3 people will have a serious problem if it is not treated.  Early diagnosis allows for more effective treatment and cure.  If you have a  Patient continues to move all over the bed bending her right arm at IV site continuously.  Education provided on the need to ensure less activity at the IV insertion site to maintain patency.  Verbal understanding obtained.  Coban applied with 2x2 to optimize the maintenance of site.  IV flushing with no s/sx of infiltration when arm is maintained straight at this time.   family history of AAA or are a male age 65-75 who has smoked, you are at higher risk of an AAA.  Your provider can order this test, if needed.    Colorectal Screening:  · There are many tests that are used to check for cancer of your colon and rectum. You and your provider should discuss what test is best for you and when to have it done.  Options include:  · Screening Colonoscopy: exam of the entire colon, seen through a flexible lighted tube.  · Flexible Sigmoidoscopy: exam of the last third (sigmoid portion) of the colon and rectum, seen through a flexible lighted tube.  · Cologuard DNA stool test: a sample of your stool is used to screen for cancer and unseen blood in your stool.  · Fecal Occult Blood Test: a sample of your stool is studied to find any unseen blood    Flu Shot:  · An immunization that helps to prevent influenza (the flu). You should get this every year. The best time to get the shot is in the fall.    Pneumococcal Shot:  • Vaccines are available that can help prevent pneumococcal disease, which is any type of infection caused by Streptococcus pneumoniae bacteria.   Their use can prevent some cases of pneumonia, meningitis, and sepsis. There are two types of pneumococcal vaccines:   o Conjugate vaccines (PCV-13 or Prevnar 13®) - helps protect against the 13 types of pneumococcal bacteria that are the most common causes of serious infections in children and adults.    o Polysaccharide vaccine (PPSV23 or Lhznnqmiw75®) - helps protect against 23 types of pneumococcal bacteria for patients who are recommended to get it.  These vaccines should be given at least 12 months apart.  A booster is usually not needed.     Hepatitis B Shot:  · An immunization that helps to protect people from getting Hepatitis B. Hepatitis B is a virus that spreads through contact with infected blood or body fluids. Many people with the virus do not have symptoms.  The virus can lead to serious problems, such as liver  disease. Some people are at higher risk than others. Your doctor will tell you if you need this shot.     Diabetes Screening:  · A test to measure sugar (glucose) in your blood is called a fasting blood sugar. Fasting means you cannot have food or drink for at least 8 hours before the test. This test can detect diabetes long before you may notice symptoms.    Glaucoma Screening:  · Glaucoma screening is performed by your eye doctor. The test measures the fluid pressure inside your eyes to determine if you have glaucoma.     Hepatitis C Screening:  · A blood test to see if you have the hepatitis C virus.  Hepatitis C attacks the liver and is a major cause of chronic liver disease.  Medicare will cover a single screening for all adults born between 1945 & 1965, or high risk patients (people who have injected illegal drugs or people who have had blood transfusions).  High risk patients who continue to inject illegal drugs can be screened for Hepatitis C every year.    Smoking and Tobacco-Use Cessation Counseling:  · Tobacco is the single greatest cause of disease and early death in our country today. Medication and counseling together can increase a person’s chance of quitting for good.   · Medicare covers two quitting attempts per year, with four counseling sessions per attempt (eight sessions in a 12 month period)    Preventive Screening tests for Women    Screening Mammograms and Breast Exams:  · An x-ray of your breasts to check for breast cancer before you or your doctor may be able to feel it.  If breast cancer is found early it can usually be treated with success.    Pelvic Exams and Pap Tests:  · An exam to check for cervical and vaginal cancer. A Pap test is a lab test in which cells are taken from your cervix and sent to the lab to look for signs of cervical cancer. If cancer of the cervix is found early, chances for a cure are good. Testing can generally end at age 65, or if a woman has a hysterectomy for a  benign condition. Your provider may recommend more frequent testing if certain abnormal results are found.    Bone Mass Measurements:  · A painless x-ray of your bone density to see if you are at risk for a broken bone. Bone density refers to the thickness of bones or how tightly the bone tissue is packed.    Preventive Screening tests for Men    Prostate Screening:  · Should you have a prostate cancer test (PSA)?  It is up to you to decide if you want a prostate cancer test. Talk to your clinician to find out if the test is right for you.  Things for you to consider and talk about should include:  · Benefits and harms of the test  · Your family history  · How your race/ethnicity may influence the test  · If the test may impact other medical conditions you have  · Your values on screenings and treatments    *Medicare pays for many preventive services to keep you healthy. For some of these services, you might have to pay a deductible, coinsurance, and / or copayment.  The amounts vary depending on the type of services you need and the kind of Medicare health plan you have.    For further details on screenings offered by Medicare please visit: https://www.medicare.gov/coverage/preventive-screening-services

## 2024-08-24 NOTE — ED NOTES
Patient reports she has not taken any of her home medications this morning, but will be taking her medication upon return home this am.

## 2024-08-24 NOTE — ED PROVIDER NOTES
Encounter Date: 2024       History     Chief Complaint   Patient presents with    Flank Pain     Woke with left flank pain.  Pain does not radiate.  Nauseated.  Vomited x 2.  No diarrhea.  LBM today normal brown and formed.  Does not make urine.  Dialysis last yesterday.  Dialysis shunt to LUE with positive thrill.         34-year-old female very known to this ED due to multiple ED visits, with significant history below includes end-stage renal disease on hemodialysis, last dialysis yesterday, diabetes, gastroparesis.  She returns to the ED today with chief complaints of left flank pain with onset upon waking up from bed this morning.  Nothing makes it better or worse. She reports associated nausea.  Had a normal bowel movement today.   She was seen here 2 days ago on 2 different times; in the morning and evening, had a CT scan abdomen pelvis which showed concern for constipation otherwise no significant acute findings.     The history is provided by the patient and the EMS personnel. No  was used.     Review of patient's allergies indicates:   Allergen Reactions    Droperidol Hives    Haldol [haloperidol lactate] Hives    Penicillins Hives     Past Medical History:   Diagnosis Date    ESRD on hemodialysis 2022    Gastritis 2022    EGD was 21    Gastroparesis 2022    has not had Emptying study    Heart failure with preserved ejection fraction 2022    EF 55% on 3/22    History of supraventricular tachycardia     Hyperkalemia 2022    Hypertensive emergency 2022    Sickle cell trait 2022    Type 2 diabetes mellitus      Past Surgical History:   Procedure Laterality Date     SECTION      x 3    COLONOSCOPY      COLONOSCOPY N/A 2022    Procedure: COLONOSCOPY;  Surgeon: Jagdeep Cedeno MD;  Location: [facility];  Service: Endoscopy;  Laterality: N/A;    DESTRUCTION, CILIARY BODY, USING LASER Left 3/8/2024    Procedure: Cyclophotocoagulation  G-probe, retrobulbar chlorpromazine left eye;  Surgeon: Fidel Wise MD;  Location: [address];  Service: Ophthalmology;  Laterality: Left;    ENDOSCOPIC ULTRASOUND OF UPPER GASTROINTESTINAL TRACT N/A 12/16/2023    Procedure: ULTRASOUND, UPPER GI TRACT, ENDOSCOPIC;  Surgeon: Micky Paredes III, MD;  Location: [facility];  Service: Endoscopy;  Laterality: N/A;    ESOPHAGOGASTRODUODENOSCOPY N/A 10/18/2019    Procedure: ESOPHAGOGASTRODUODENOSCOPY (EGD);  Surgeon: Gianluca Mendez MD;  Location: [facility];  Service: Endoscopy;  Laterality: N/A;    ESOPHAGOGASTRODUODENOSCOPY N/A 08/24/2022    Procedure: EGD (ESOPHAGOGASTRODUODENOSCOPY);  Surgeon: Micky Paredes III, MD;  Location: [facility];  Service: Endoscopy;  Laterality: N/A;    ESOPHAGOGASTRODUODENOSCOPY N/A 12/5/2022    Procedure: EGD (ESOPHAGOGASTRODUODENOSCOPY);  Surgeon: Marcelo Zhong MD;  Location: [facility];  Service: Endoscopy;  Laterality: N/A;    INSERTION, CATHETER, TUNNELED N/A 6/17/2023    Procedure: Insertion,catheter,tunneled;  Surgeon: Carlos Thurman Jr., MD;  Location: [facility] OR;  Service: General;  Laterality: N/A;    LAPAROSCOPIC CHOLECYSTECTOMY N/A 07/30/2022    Procedure: CHOLECYSTECTOMY, LAPAROSCOPIC;  Surgeon: Grey Perez MD;  Location: [facility] OR;  Service: General;  Laterality: N/A;    PLACEMENT OF DUAL-LUMEN VASCULAR CATHETER Left 07/12/2022    Procedure: INSERTION-CATHETER-JOSEPH;  Surgeon: Dionte Gan MD;  Location: [facility] OR;  Service: General;  Laterality: Left;    PLACEMENT OF DUAL-LUMEN VASCULAR CATHETER Right 07/26/2022    Procedure: INSERTION-CATHETER-Hemosplit;  Surgeon: Dionte Gan MD;  Location: [facility] OR;  Service: General;  Laterality: Right;     Family History   Problem Relation Name Age of Onset    Diabetes Mother      Diabetes Father       Social History     Tobacco Use    Smoking status: Never    Smokeless tobacco: Never   Substance Use Topics    Alcohol use: Not Currently    Drug use: No      Review of Systems   Constitutional: Negative.    HENT: Negative.     Eyes: Negative.    Respiratory: Negative.     Cardiovascular: Negative.    Gastrointestinal:  Positive for abdominal pain and nausea.   Endocrine: Negative.    Genitourinary: Negative.    Musculoskeletal: Negative.    Skin: Negative.    Neurological: Negative.    Hematological: Negative.    Psychiatric/Behavioral: Negative.     All other systems reviewed and are negative.      Physical Exam     Initial Vitals [08/24/24 0728]   BP Pulse Resp Temp SpO2   (!) 196/97 95 20 98 °F (36.7 °C) 98 %      MAP       --         Physical Exam    Nursing note and vitals reviewed.  Constitutional: She appears well-developed and well-nourished.   HENT:   Head: Normocephalic.   Eyes: Pupils are equal, round, and reactive to light.   Neck: No JVD present.   Normal range of motion.  Cardiovascular:  Normal rate.           Pulmonary/Chest: Breath sounds normal. No respiratory distress. She has no wheezes. She has no rhonchi. She has no rales.   Abdominal: Abdomen is soft. Bowel sounds are normal.   Musculoskeletal:      Cervical back: Normal range of motion.     Lymphadenopathy:     She has no cervical adenopathy.   Neurological: She is alert and oriented to person, place, and time. She has normal strength. GCS score is 15. GCS eye subscore is 4. GCS verbal subscore is 5. GCS motor subscore is 6.   Skin: Skin is warm. Capillary refill takes less than 2 seconds.   Psychiatric: She has a normal mood and affect.         ED Course   Procedures  Labs Reviewed   CBC W/ AUTO DIFFERENTIAL - Abnormal       Result Value    WBC 8.24      RBC 3.85 (*)     Hemoglobin 11.2 (*)     Hematocrit 33.6 (*)     MCV 87      MCH 29.1      MCHC 33.3      RDW 18.4 (*)     Platelets 150      MPV 11.1      Immature Granulocytes 1.7 (*)     Gran # (ANC) 6.3      Immature Grans (Abs) 0.14 (*)     Lymph # 1.0      Mono # 0.8      Eos # 0.0      Baso # 0.02      nRBC 0      Gran % 76.2 (*)      Lymph % 12.0 (*)     Mono % 9.5      Eosinophil % 0.4      Basophil % 0.2      Differential Method Automated     COMPREHENSIVE METABOLIC PANEL - Abnormal    Sodium 139      Potassium 4.2      Chloride 90 (*)     CO2 19 (*)     Glucose 339 (*)     BUN 45 (*)     Creatinine 6.3 (*)     Calcium 9.5      Total Protein 7.3      Albumin 3.7      Total Bilirubin 2.1 (*)     Alkaline Phosphatase 124      AST 17      ALT 38      eGFR 8.3 (*)     Anion Gap 30 (*)    ACETONE - Abnormal    Acetone, Bld Small (*)    POCT GLUCOSE - Abnormal    POCT Glucose 354 (*)    LIPASE    Lipase 18     ACETONE    Acetone, Bld Negative     POCT GLUCOSE MONITORING CONTINUOUS          Imaging Results    None          Medications   morphine injection 4 mg (4 mg Intravenous Given 8/24/24 0746)   promethazine (PHENERGAN) 12.5 mg in 0.9% NaCl 50 mL IVPB (0 mg Intravenous Stopped 8/24/24 0812)   HYDROmorphone injection 1 mg (1 mg Intravenous Given 8/24/24 0820)   insulin regular injection 5 Units 0.05 mL (5 Units Intravenous Given 8/24/24 0849)     Medical Decision Making  Ddx dependency and pain medications, complex regional pain syndrome, others  34 yr F returns with left flank pain, similar to pain for which she has been seen here multiple times. Physical exam is benign. CT scan done 2 days ago showed chronic findings without acute  findings. Screening labs today including CBC, CMP, lipase shows findings similar to baseline. She received insulin for hyperglycemia, analgesics, antiemetics in the ED, and upon re-evaluation she is sleeping comfortably. I had a long discussion with the patient, and recommend that she follow up with pain management primary care physician. Strict return precautions discussed. She verbalized understanding.    Amount and/or Complexity of Data Reviewed  Labs: ordered.    Risk  OTC drugs.  Prescription drug management.                                      Clinical Impression:  Final diagnoses:  [R10.9] Left flank  pain  [R73.9] Hyperglycemia  [F19.20] Dependency on pain medication  [I51.7] Cardiomegaly (Primary)          ED Disposition Condition    Discharge Stable          ED Prescriptions    None       Follow-up Information       Follow up With Specialties Details Why Contact Info    Melony Kim, NP Family Medicine  As needed [address] [place] [id number]  [phone]               Luis Alfredo Fournier MD  08/24/24 3030

## 2024-08-24 NOTE — ED NOTES
Patient denies pain relief and requesting additional pain medication.  Patient education provided on synergistic component of promethazine and encouraged to rest and let medication work.  MD notified of ongoing pain at this time, per patient request.  Lights dimmed per patient request.

## 2024-08-25 PROBLEM — E10.10 TYPE 1 DIABETES MELLITUS WITH KETOACIDOSIS WITHOUT COMA: Status: ACTIVE | Noted: 2024-08-25

## 2024-08-25 PROBLEM — I15.1 HYPERTENSION SECONDARY TO OTHER RENAL DISORDERS: Status: ACTIVE | Noted: 2024-08-25

## 2024-08-25 LAB
ALBUMIN SERPL BCP-MCNC: 3.5 G/DL (ref 3.5–5.2)
ALP SERPL-CCNC: 93 U/L (ref 55–135)
ALT SERPL W/O P-5'-P-CCNC: 20 U/L (ref 10–44)
ANION GAP SERPL CALC-SCNC: 11 MMOL/L (ref 8–16)
ANION GAP SERPL CALC-SCNC: 11 MMOL/L (ref 8–16)
AST SERPL-CCNC: 13 U/L (ref 10–40)
BASOPHILS # BLD AUTO: 0.01 K/UL (ref 0–0.2)
BASOPHILS NFR BLD: 0.1 % (ref 0–1.9)
BILIRUB SERPL-MCNC: 1.4 MG/DL (ref 0.1–1)
BUN SERPL-MCNC: 26 MG/DL (ref 6–20)
BUN SERPL-MCNC: 29 MG/DL (ref 6–20)
CALCIUM SERPL-MCNC: 8.8 MG/DL (ref 8.7–10.5)
CALCIUM SERPL-MCNC: 8.8 MG/DL (ref 8.7–10.5)
CHLORIDE SERPL-SCNC: 100 MMOL/L (ref 95–110)
CHLORIDE SERPL-SCNC: 99 MMOL/L (ref 95–110)
CO2 SERPL-SCNC: 31 MMOL/L (ref 23–29)
CO2 SERPL-SCNC: 32 MMOL/L (ref 23–29)
CREAT SERPL-MCNC: 4 MG/DL (ref 0.5–1.4)
CREAT SERPL-MCNC: 4.7 MG/DL (ref 0.5–1.4)
DIFFERENTIAL METHOD BLD: ABNORMAL
EOSINOPHIL # BLD AUTO: 0.1 K/UL (ref 0–0.5)
EOSINOPHIL NFR BLD: 1 % (ref 0–8)
ERYTHROCYTE [DISTWIDTH] IN BLOOD BY AUTOMATED COUNT: 18.7 % (ref 11.5–14.5)
EST. GFR  (NO RACE VARIABLE): 11.8 ML/MIN/1.73 M^2
EST. GFR  (NO RACE VARIABLE): 14.3 ML/MIN/1.73 M^2
GLUCOSE SERPL-MCNC: 100 MG/DL (ref 70–110)
GLUCOSE SERPL-MCNC: 108 MG/DL (ref 70–110)
GLUCOSE SERPL-MCNC: 136 MG/DL (ref 70–110)
GLUCOSE SERPL-MCNC: 139 MG/DL (ref 70–110)
GLUCOSE SERPL-MCNC: 158 MG/DL (ref 70–110)
GLUCOSE SERPL-MCNC: 185 MG/DL (ref 70–110)
GLUCOSE SERPL-MCNC: 382 MG/DL (ref 70–110)
GLUCOSE SERPL-MCNC: 55 MG/DL (ref 70–110)
GLUCOSE SERPL-MCNC: 61 MG/DL (ref 70–110)
GLUCOSE SERPL-MCNC: 66 MG/DL (ref 70–110)
GLUCOSE SERPL-MCNC: 66 MG/DL (ref 70–110)
GLUCOSE SERPL-MCNC: 71 MG/DL (ref 70–110)
GLUCOSE SERPL-MCNC: 80 MG/DL (ref 70–110)
GLUCOSE SERPL-MCNC: 80 MG/DL (ref 70–110)
GLUCOSE SERPL-MCNC: 82 MG/DL (ref 70–110)
GLUCOSE SERPL-MCNC: 84 MG/DL (ref 70–110)
GLUCOSE SERPL-MCNC: 91 MG/DL (ref 70–110)
GLUCOSE SERPL-MCNC: 95 MG/DL (ref 70–110)
HCT VFR BLD AUTO: 31.2 % (ref 37–48.5)
HGB BLD-MCNC: 10.4 G/DL (ref 12–16)
IMM GRANULOCYTES # BLD AUTO: 0.03 K/UL (ref 0–0.04)
IMM GRANULOCYTES NFR BLD AUTO: 0.4 % (ref 0–0.5)
LYMPHOCYTES # BLD AUTO: 1.1 K/UL (ref 1–4.8)
LYMPHOCYTES NFR BLD: 16.3 % (ref 18–48)
MAGNESIUM SERPL-MCNC: 2 MG/DL (ref 1.6–2.6)
MCH RBC QN AUTO: 29.8 PG (ref 27–31)
MCHC RBC AUTO-ENTMCNC: 33.3 G/DL (ref 32–36)
MCV RBC AUTO: 89 FL (ref 82–98)
MONOCYTES # BLD AUTO: 0.8 K/UL (ref 0.3–1)
MONOCYTES NFR BLD: 11.4 % (ref 4–15)
NEUTROPHILS # BLD AUTO: 4.9 K/UL (ref 1.8–7.7)
NEUTROPHILS NFR BLD: 70.8 % (ref 38–73)
NRBC BLD-RTO: 0 /100 WBC
PLATELET # BLD AUTO: 139 K/UL (ref 150–450)
PMV BLD AUTO: 10.3 FL (ref 9.2–12.9)
POTASSIUM SERPL-SCNC: 3 MMOL/L (ref 3.5–5.1)
POTASSIUM SERPL-SCNC: 3.4 MMOL/L (ref 3.5–5.1)
PROT SERPL-MCNC: 5.9 G/DL (ref 6–8.4)
RBC # BLD AUTO: 3.49 M/UL (ref 4–5.4)
SODIUM SERPL-SCNC: 142 MMOL/L (ref 136–145)
SODIUM SERPL-SCNC: 142 MMOL/L (ref 136–145)
WBC # BLD AUTO: 6.93 K/UL (ref 3.9–12.7)

## 2024-08-25 PROCEDURE — 96372 THER/PROPH/DIAG INJ SC/IM: CPT | Performed by: STUDENT IN AN ORGANIZED HEALTH CARE EDUCATION/TRAINING PROGRAM

## 2024-08-25 PROCEDURE — 80053 COMPREHEN METABOLIC PANEL: CPT | Performed by: HOSPITALIST

## 2024-08-25 PROCEDURE — 83735 ASSAY OF MAGNESIUM: CPT | Performed by: HOSPITALIST

## 2024-08-25 PROCEDURE — 63600175 PHARM REV CODE 636 W HCPCS: Performed by: HOSPITALIST

## 2024-08-25 PROCEDURE — 36415 COLL VENOUS BLD VENIPUNCTURE: CPT | Performed by: HOSPITALIST

## 2024-08-25 PROCEDURE — 96376 TX/PRO/DX INJ SAME DRUG ADON: CPT

## 2024-08-25 PROCEDURE — 96365 THER/PROPH/DIAG IV INF INIT: CPT

## 2024-08-25 PROCEDURE — 82962 GLUCOSE BLOOD TEST: CPT

## 2024-08-25 PROCEDURE — 25000003 PHARM REV CODE 250: Performed by: HOSPITALIST

## 2024-08-25 PROCEDURE — 21400001 HC TELEMETRY ROOM

## 2024-08-25 PROCEDURE — 63600175 PHARM REV CODE 636 W HCPCS: Performed by: STUDENT IN AN ORGANIZED HEALTH CARE EDUCATION/TRAINING PROGRAM

## 2024-08-25 PROCEDURE — 5A1D70Z PERFORMANCE OF URINARY FILTRATION, INTERMITTENT, LESS THAN 6 HOURS PER DAY: ICD-10-PCS | Performed by: FAMILY MEDICINE

## 2024-08-25 PROCEDURE — 85025 COMPLETE CBC W/AUTO DIFF WBC: CPT | Performed by: HOSPITALIST

## 2024-08-25 PROCEDURE — S5010 5% DEXTROSE AND 0.45% SALINE: HCPCS | Performed by: HOSPITALIST

## 2024-08-25 PROCEDURE — 94761 N-INVAS EAR/PLS OXIMETRY MLT: CPT

## 2024-08-25 PROCEDURE — 63600175 PHARM REV CODE 636 W HCPCS: Performed by: INTERNAL MEDICINE

## 2024-08-25 PROCEDURE — 99900035 HC TECH TIME PER 15 MIN (STAT)

## 2024-08-25 PROCEDURE — 27000221 HC OXYGEN, UP TO 24 HOURS

## 2024-08-25 PROCEDURE — 96375 TX/PRO/DX INJ NEW DRUG ADDON: CPT

## 2024-08-25 PROCEDURE — 94799 UNLISTED PULMONARY SVC/PX: CPT

## 2024-08-25 PROCEDURE — 80048 BASIC METABOLIC PNL TOTAL CA: CPT | Performed by: HOSPITALIST

## 2024-08-25 RX ORDER — MUPIROCIN 20 MG/G
OINTMENT TOPICAL 2 TIMES DAILY
Status: DISCONTINUED | OUTPATIENT
Start: 2024-08-25 | End: 2024-08-26 | Stop reason: HOSPADM

## 2024-08-25 RX ORDER — DEXTROSE MONOHYDRATE AND SODIUM CHLORIDE 5; .45 G/100ML; G/100ML
INJECTION, SOLUTION INTRAVENOUS CONTINUOUS PRN
Status: DISCONTINUED | OUTPATIENT
Start: 2024-08-25 | End: 2024-08-26 | Stop reason: HOSPADM

## 2024-08-25 RX ORDER — PROCHLORPERAZINE MALEATE 5 MG
10 TABLET ORAL EVERY 6 HOURS PRN
Status: DISCONTINUED | OUTPATIENT
Start: 2024-08-25 | End: 2024-08-26 | Stop reason: HOSPADM

## 2024-08-25 RX ORDER — GLUCAGON 1 MG
1 KIT INJECTION
Status: DISCONTINUED | OUTPATIENT
Start: 2024-08-25 | End: 2024-08-25

## 2024-08-25 RX ORDER — GLUCAGON 1 MG
1 KIT INJECTION
Status: DISCONTINUED | OUTPATIENT
Start: 2024-08-25 | End: 2024-08-26 | Stop reason: HOSPADM

## 2024-08-25 RX ORDER — PREDNISOLONE ACETATE 10 MG/ML
1 SUSPENSION/ DROPS OPHTHALMIC 2 TIMES DAILY
Status: DISCONTINUED | OUTPATIENT
Start: 2024-08-25 | End: 2024-08-26 | Stop reason: HOSPADM

## 2024-08-25 RX ORDER — IBUPROFEN 200 MG
24 TABLET ORAL
Status: DISCONTINUED | OUTPATIENT
Start: 2024-08-25 | End: 2024-08-26 | Stop reason: HOSPADM

## 2024-08-25 RX ORDER — ACETAMINOPHEN 325 MG/1
650 TABLET ORAL EVERY 4 HOURS PRN
Status: DISCONTINUED | OUTPATIENT
Start: 2024-08-25 | End: 2024-08-26 | Stop reason: HOSPADM

## 2024-08-25 RX ORDER — SUCRALFATE 1 G/1
1 TABLET ORAL 4 TIMES DAILY
Status: DISCONTINUED | OUTPATIENT
Start: 2024-08-25 | End: 2024-08-26 | Stop reason: HOSPADM

## 2024-08-25 RX ORDER — FLUOXETINE 10 MG/1
10 CAPSULE ORAL DAILY
Status: DISCONTINUED | OUTPATIENT
Start: 2024-08-25 | End: 2024-08-26 | Stop reason: HOSPADM

## 2024-08-25 RX ORDER — TALC
3 POWDER (GRAM) TOPICAL NIGHTLY PRN
Status: DISCONTINUED | OUTPATIENT
Start: 2024-08-25 | End: 2024-08-26 | Stop reason: HOSPADM

## 2024-08-25 RX ORDER — DEXTROSE MONOHYDRATE 100 MG/ML
INJECTION, SOLUTION INTRAVENOUS
Status: DISCONTINUED | OUTPATIENT
Start: 2024-08-25 | End: 2024-08-25

## 2024-08-25 RX ORDER — LEVETIRACETAM 500 MG/1
500 TABLET ORAL 2 TIMES DAILY
Status: DISCONTINUED | OUTPATIENT
Start: 2024-08-25 | End: 2024-08-26 | Stop reason: HOSPADM

## 2024-08-25 RX ORDER — PANTOPRAZOLE SODIUM 40 MG/1
40 TABLET, DELAYED RELEASE ORAL DAILY
Qty: 30 TABLET | Refills: 0 | Status: SHIPPED | OUTPATIENT
Start: 2024-08-25 | End: 2024-09-24

## 2024-08-25 RX ORDER — DORZOLAMIDE HYDROCHLORIDE AND TIMOLOL MALEATE 20; 5 MG/ML; MG/ML
1 SOLUTION/ DROPS OPHTHALMIC 2 TIMES DAILY
Status: DISCONTINUED | OUTPATIENT
Start: 2024-08-25 | End: 2024-08-26

## 2024-08-25 RX ORDER — BRINZOLAMIDE 10 MG/ML
1 SUSPENSION/ DROPS OPHTHALMIC 3 TIMES DAILY
Status: DISCONTINUED | OUTPATIENT
Start: 2024-08-25 | End: 2024-08-25

## 2024-08-25 RX ORDER — IBUPROFEN 200 MG
16 TABLET ORAL
Status: DISCONTINUED | OUTPATIENT
Start: 2024-08-25 | End: 2024-08-26 | Stop reason: HOSPADM

## 2024-08-25 RX ORDER — IBUPROFEN 200 MG
16 TABLET ORAL
Status: DISCONTINUED | OUTPATIENT
Start: 2024-08-25 | End: 2024-08-25

## 2024-08-25 RX ORDER — INSULIN ASPART 100 [IU]/ML
0-10 INJECTION, SOLUTION INTRAVENOUS; SUBCUTANEOUS
Status: DISCONTINUED | OUTPATIENT
Start: 2024-08-25 | End: 2024-08-26 | Stop reason: HOSPADM

## 2024-08-25 RX ORDER — PANTOPRAZOLE SODIUM 40 MG/10ML
40 INJECTION, POWDER, LYOPHILIZED, FOR SOLUTION INTRAVENOUS DAILY
Status: DISCONTINUED | OUTPATIENT
Start: 2024-08-25 | End: 2024-08-26 | Stop reason: HOSPADM

## 2024-08-25 RX ORDER — ONDANSETRON HYDROCHLORIDE 2 MG/ML
4 INJECTION, SOLUTION INTRAVENOUS
Status: COMPLETED | OUTPATIENT
Start: 2024-08-25 | End: 2024-08-25

## 2024-08-25 RX ORDER — KETOROLAC TROMETHAMINE 30 MG/ML
30 INJECTION, SOLUTION INTRAMUSCULAR; INTRAVENOUS ONCE
Status: DISCONTINUED | OUTPATIENT
Start: 2024-08-25 | End: 2024-08-25

## 2024-08-25 RX ORDER — TIMOLOL MALEATE 5 MG/ML
1 SOLUTION/ DROPS OPHTHALMIC 2 TIMES DAILY
Status: DISCONTINUED | OUTPATIENT
Start: 2024-08-25 | End: 2024-08-26 | Stop reason: HOSPADM

## 2024-08-25 RX ORDER — IBUPROFEN 200 MG
24 TABLET ORAL
Status: DISCONTINUED | OUTPATIENT
Start: 2024-08-25 | End: 2024-08-25

## 2024-08-25 RX ORDER — HYDRALAZINE HYDROCHLORIDE 25 MG/1
25 TABLET, FILM COATED ORAL EVERY 8 HOURS
Status: DISCONTINUED | OUTPATIENT
Start: 2024-08-25 | End: 2024-08-26 | Stop reason: HOSPADM

## 2024-08-25 RX ORDER — SEVELAMER CARBONATE 800 MG/1
800 TABLET, FILM COATED ORAL
Status: DISCONTINUED | OUTPATIENT
Start: 2024-08-25 | End: 2024-08-26 | Stop reason: HOSPADM

## 2024-08-25 RX ORDER — INSULIN ASPART 100 [IU]/ML
0-10 INJECTION, SOLUTION INTRAVENOUS; SUBCUTANEOUS
Status: DISCONTINUED | OUTPATIENT
Start: 2024-08-25 | End: 2024-08-25

## 2024-08-25 RX ORDER — ONDANSETRON 4 MG/1
4 TABLET, ORALLY DISINTEGRATING ORAL EVERY 6 HOURS PRN
Status: DISCONTINUED | OUTPATIENT
Start: 2024-08-25 | End: 2024-08-26 | Stop reason: HOSPADM

## 2024-08-25 RX ORDER — HYDROMORPHONE HYDROCHLORIDE 1 MG/ML
1 INJECTION, SOLUTION INTRAMUSCULAR; INTRAVENOUS; SUBCUTANEOUS EVERY 4 HOURS PRN
Status: DISCONTINUED | OUTPATIENT
Start: 2024-08-25 | End: 2024-08-26 | Stop reason: HOSPADM

## 2024-08-25 RX ORDER — INSULIN GLARGINE 100 [IU]/ML
20 INJECTION, SOLUTION SUBCUTANEOUS NIGHTLY
Status: DISCONTINUED | OUTPATIENT
Start: 2024-08-25 | End: 2024-08-26 | Stop reason: HOSPADM

## 2024-08-25 RX ORDER — OXYCODONE HYDROCHLORIDE 5 MG/1
5 TABLET ORAL EVERY 4 HOURS PRN
Status: DISCONTINUED | OUTPATIENT
Start: 2024-08-25 | End: 2024-08-26 | Stop reason: HOSPADM

## 2024-08-25 RX ORDER — POTASSIUM CHLORIDE 20 MEQ/1
20 TABLET, EXTENDED RELEASE ORAL ONCE
Status: COMPLETED | OUTPATIENT
Start: 2024-08-25 | End: 2024-08-25

## 2024-08-25 RX ORDER — HYDROMORPHONE HYDROCHLORIDE 1 MG/ML
1 INJECTION, SOLUTION INTRAMUSCULAR; INTRAVENOUS; SUBCUTANEOUS EVERY 4 HOURS PRN
Status: DISCONTINUED | OUTPATIENT
Start: 2024-08-25 | End: 2024-08-25

## 2024-08-25 RX ORDER — BRIMONIDINE TARTRATE 1.5 MG/ML
1 SOLUTION/ DROPS OPHTHALMIC 3 TIMES DAILY
Status: DISCONTINUED | OUTPATIENT
Start: 2024-08-25 | End: 2024-08-26 | Stop reason: HOSPADM

## 2024-08-25 RX ORDER — SODIUM CHLORIDE 0.9 % (FLUSH) 0.9 %
10 SYRINGE (ML) INJECTION
Status: DISCONTINUED | OUTPATIENT
Start: 2024-08-25 | End: 2024-08-26 | Stop reason: HOSPADM

## 2024-08-25 RX ORDER — SODIUM CHLORIDE 9 MG/ML
1000 INJECTION, SOLUTION INTRAVENOUS CONTINUOUS
Status: DISCONTINUED | OUTPATIENT
Start: 2024-08-25 | End: 2024-08-25

## 2024-08-25 RX ORDER — BUSPIRONE HYDROCHLORIDE 5 MG/1
10 TABLET ORAL DAILY
Status: DISCONTINUED | OUTPATIENT
Start: 2024-08-25 | End: 2024-08-26 | Stop reason: HOSPADM

## 2024-08-25 RX ORDER — HEPARIN SODIUM 5000 [USP'U]/ML
5000 INJECTION, SOLUTION INTRAVENOUS; SUBCUTANEOUS EVERY 8 HOURS
Status: DISCONTINUED | OUTPATIENT
Start: 2024-08-25 | End: 2024-08-26 | Stop reason: HOSPADM

## 2024-08-25 RX ADMIN — DEXTROSE MONOHYDRATE 12.5 G: 25 INJECTION, SOLUTION INTRAVENOUS at 09:08

## 2024-08-25 RX ADMIN — SUCRALFATE 1 G: 1 TABLET ORAL at 01:08

## 2024-08-25 RX ADMIN — DEXTROSE MONOHYDRATE 12.5 G: 25 INJECTION, SOLUTION INTRAVENOUS at 07:08

## 2024-08-25 RX ADMIN — SUCRALFATE 1 G: 1 TABLET ORAL at 05:08

## 2024-08-25 RX ADMIN — DEXTROSE MONOHYDRATE 25 G: 25 INJECTION, SOLUTION INTRAVENOUS at 10:08

## 2024-08-25 RX ADMIN — SODIUM CHLORIDE 1000 ML: 9 INJECTION, SOLUTION INTRAVENOUS at 05:08

## 2024-08-25 RX ADMIN — SEVELAMER CARBONATE 800 MG: 800 TABLET, FILM COATED ORAL at 05:08

## 2024-08-25 RX ADMIN — HYDRALAZINE HYDROCHLORIDE 25 MG: 25 TABLET ORAL at 09:08

## 2024-08-25 RX ADMIN — HYDRALAZINE HYDROCHLORIDE 25 MG: 25 TABLET ORAL at 01:08

## 2024-08-25 RX ADMIN — INSULIN HUMAN 0.05 UNITS/KG/HR: 1 INJECTION, SOLUTION INTRAVENOUS at 05:08

## 2024-08-25 RX ADMIN — SUCRALFATE 1 G: 1 TABLET ORAL at 09:08

## 2024-08-25 RX ADMIN — ONDANSETRON 4 MG: 2 INJECTION INTRAMUSCULAR; INTRAVENOUS at 03:08

## 2024-08-25 RX ADMIN — HYDROMORPHONE HYDROCHLORIDE 1 MG: 1 INJECTION, SOLUTION INTRAMUSCULAR; INTRAVENOUS; SUBCUTANEOUS at 05:08

## 2024-08-25 RX ADMIN — INSULIN ASPART 1 UNITS: 100 INJECTION, SOLUTION INTRAVENOUS; SUBCUTANEOUS at 09:08

## 2024-08-25 RX ADMIN — PANTOPRAZOLE SODIUM 40 MG: 40 INJECTION, POWDER, FOR SOLUTION INTRAVENOUS at 05:08

## 2024-08-25 RX ADMIN — HEPARIN SODIUM 5000 UNITS: 5000 INJECTION INTRAVENOUS; SUBCUTANEOUS at 01:08

## 2024-08-25 RX ADMIN — HYDROMORPHONE HYDROCHLORIDE 1 MG: 1 INJECTION, SOLUTION INTRAMUSCULAR; INTRAVENOUS; SUBCUTANEOUS at 03:08

## 2024-08-25 RX ADMIN — PREDNISOLONE ACETATE 1 DROP: 10 SUSPENSION/ DROPS OPHTHALMIC at 09:08

## 2024-08-25 RX ADMIN — INSULIN GLARGINE 20 UNITS: 100 INJECTION, SOLUTION SUBCUTANEOUS at 09:08

## 2024-08-25 RX ADMIN — BUSPIRONE HYDROCHLORIDE 10 MG: 5 TABLET ORAL at 10:08

## 2024-08-25 RX ADMIN — HYDROMORPHONE HYDROCHLORIDE 1 MG: 1 INJECTION, SOLUTION INTRAMUSCULAR; INTRAVENOUS; SUBCUTANEOUS at 06:08

## 2024-08-25 RX ADMIN — DEXTROSE AND SODIUM CHLORIDE 50 ML/HR: 5; 450 INJECTION, SOLUTION INTRAVENOUS at 06:08

## 2024-08-25 RX ADMIN — HYDROMORPHONE HYDROCHLORIDE 1 MG: 1 INJECTION, SOLUTION INTRAMUSCULAR; INTRAVENOUS; SUBCUTANEOUS at 09:08

## 2024-08-25 RX ADMIN — LEVETIRACETAM 500 MG: 500 TABLET, FILM COATED ORAL at 10:08

## 2024-08-25 RX ADMIN — FLUOXETINE 10 MG: 10 CAPSULE ORAL at 10:08

## 2024-08-25 RX ADMIN — HYDRALAZINE HYDROCHLORIDE 25 MG: 25 TABLET ORAL at 06:08

## 2024-08-25 RX ADMIN — HEPARIN SODIUM 5000 UNITS: 5000 INJECTION INTRAVENOUS; SUBCUTANEOUS at 09:08

## 2024-08-25 RX ADMIN — HEPARIN SODIUM 5000 UNITS: 5000 INJECTION INTRAVENOUS; SUBCUTANEOUS at 06:08

## 2024-08-25 RX ADMIN — INSULIN GLARGINE 22 UNITS: 100 INJECTION, SOLUTION SUBCUTANEOUS at 12:08

## 2024-08-25 RX ADMIN — LEVETIRACETAM 500 MG: 500 TABLET, FILM COATED ORAL at 09:08

## 2024-08-25 RX ADMIN — POTASSIUM CHLORIDE 20 MEQ: 20 TABLET, EXTENDED RELEASE ORAL at 01:08

## 2024-08-25 NOTE — CONSULTS
Nephrology Consult Note        Patient Name: Tabby Howard  MRN: [id number]    Patient Class: IP- Inpatient   Admission Date: 8/24/2024  Length of Stay: 0 days  Date of Service: 8/25/2024    Attending Physician: Yuli Rodríguez MD  Primary Care Provider: Melony Kim NP    Reason for Consult: esrd    SUBJECTIVE:     HPI: 34F with ESRD on HD TTS and known diabetic gastroparesis with frequent admissions p/w 10/10 abdominal pain, located diffusely throughout, with associated vomiting. She is unable to manage this discomfort at home. She presented for this to the [place] ED on 8/22/24 PM and was discharged early 8/23/24 AM; she never truly felt better, but she did go to have HD on 8/24/24, and then decided to seek medical attention at the [facility] ED thereafter. No emeregncy HD needs. Was admitted to ICU for DKA management.     Review of Systems:  Constitutional:  Negative for chills, fever, malaise/fatigue and weight loss.   HENT:  Negative for hearing loss and nosebleeds.    Eyes:  Negative for blurred vision, double vision and photophobia.   Respiratory:  Negative for cough, shortness of breath and wheezing.    Cardiovascular:  Negative for chest pain, palpitations and leg swelling.   Gastrointestinal:  Negative for abdominal pain, constipation, diarrhea, heartburn, nausea and vomiting.   Genitourinary:  Negative for dysuria, frequency and urgency.   Musculoskeletal:  Negative for falls, joint pain and myalgias.   Skin:  Negative for itching and rash.   Neurological:  Negative for dizziness, speech change, focal weakness, loss of consciousness and headaches.   Endo/Heme/Allergies:  Does not bruise/bleed easily.   Psychiatric/Behavioral:  Negative for depression and substance abuse. The patient is not nervous/anxious.      ASSESSMENT/PLAN:     ESRD on HD TTS via AVF  Next dialysis per schedule or PRN.  Continue current dialysis prescription and adjust as needed.  Renal diet - low K, low phos as tolerated.  No IVs  or BP checks on access and/or non-dominant arm.    Anemia of CKD  Hgb and HCT are acceptable. Monitor for now.  Will provide SHELLEY and/or IV iron as needed to keep Hgb 8-10 range.    MBD / Secondary HPT  Ca, Phos, PTH and vitamin D levels are acceptable.   Phos binders, vitamin D and analogues, calcimimetics will be given as needed.    HTN  Tolerate asymptomatic HTN up to -160.  Continue home meds.  Low sodium diet as tolerated.    Gastroparesis, DKA  Management per primary team.    Thank you for allowing us to participate in the care of your patient!   We will follow the patient and provide recommendations as needed.         Laboratory:  Recent Labs   Lab 08/24/24  0739 08/24/24 2131 08/25/24  0849    137 142   K 4.2 3.3* 3.0*   CL 90* 88* 99   CO2 19* 21* 32*   BUN 45* 19 26*   CREATININE 6.3* 3.0* 4.0*   * 327* 80       Recent Labs   Lab 08/22/24  0604 08/22/24  1826 08/24/24  0739 08/24/24 2131 08/25/24  0849   CALCIUM 9.3 9.4 9.5 9.9 8.8   ALBUMIN 3.4* 3.6 3.7 4.9 3.5   PHOS  --  2.3*  --   --   --    MG 1.9 1.6  --   --  2.0       Recent Labs   Lab 07/08/22  0516   PTH, Intact 289.8 H       Recent Labs   Lab 08/20/24  0954 08/20/24  1122 08/22/24  0600 08/22/24  0802 08/22/24  0914 08/22/24  1759 08/22/24  2137 08/22/24  2257 08/24/24  0829 08/24/24  0958   POCTGLUCOSE >500* 356* >500* >500* 289* 88 65* 118* 354* 286*       Recent Labs   Lab 03/03/23  0547 08/01/23  0813 06/23/24  0712   Hemoglobin A1C 5.8 5.8 8.5 H       Recent Labs   Lab 08/24/24  0739 08/24/24 2131 08/25/24  0849   WBC 8.24 12.40 6.93   HGB 11.2* 12.5 10.4*   HCT 33.6* 36.8* 31.2*    180 139*   MCV 87 89 89   MCHC 33.3 34.0 33.3   MONO 9.5  0.8 7.7  1.0 11.4  0.8   EOSINOPHIL 0.4 0.8 1.0       Recent Labs   Lab 08/24/24  0739 08/24/24  2131 08/25/24  0849   BILITOT 2.1* 2.6* 1.4*   PROT 7.3 8.7* 5.9*   ALBUMIN 3.7 4.9 3.5   ALKPHOS 124 129 93   ALT 38 36 20   AST 17 21 13       Recent Labs   Lab 03/16/22  1843  05/15/22  2022 02/05/23  0156 09/19/23  1422 10/13/23  1001   Color, UA Pale Yellow   < > Yellow Yellow Yellow   Appearance, UA  --    < > Clear Clear Clear   pH, UA  --    < > >8.0 A >8.0 A 8.0   Specific Gravity, UA  --    < > 1.020 1.020 1.015   Protein, UA  --    < > 3+ A 4+ 4+   Glucose, UA 50 mg/dL A   < > 2+ A 4+ A 3+ A   Ketones, UA  --    < > 1+ A Trace A Trace A   Urobilinogen, UA Normal   < > Negative Negative Negative   Bilirubin (UA) Negative   < > 2+ A Negative 1+ A   Occult Blood UA  --    < > 3+ A 2+ A Negative   Blood, UA 0.03 mg/dL A   < >  --   --   --    Nitrite, UA  --    < > Negative Negative Negative   RBC, UA  --    < > 15 H 17 H 8 H   WBC, UA  --    < > 1 2 5   Bacteria  --    < > Rare Negative Negative   Mucus, UA Rare A  --   --   --   --    Hyaline Casts, UA  --    < > 2 A 1 2 A    < > = values in this interval not displayed.       Recent Labs   Lab 10/15/23  0353 02/24/24  1855 07/16/24  1618 08/22/24  0704 08/24/24  2230   POC PH 7.239 LL  --   --  7.471 H 7.425   POC PCO2 37.8  --   --  37.3 34.7 L   POC HCO3 16.1 L  --   --  27.2 22.8 L   POC PO2 37 LL  --   --  40 52   POC SATURATED O2 60  --   --  79 88   POC BE -11  --   --  4 H -2   Sample VENOUS   < > VENOUS VENOUS VENOUS    < > = values in this interval not displayed.       Microbiology Results (last 7 days)       ** No results found for the last 168 hours. **            Review of patient's allergies indicates:   Allergen Reactions    Droperidol Hives    Haldol [haloperidol lactate] Hives    Penicillins Hives       Outpatient meds:  No current facility-administered medications on file prior to encounter.     Current Outpatient Medications on File Prior to Encounter   Medication Sig Dispense Refill    albuterol (VENTOLIN HFA) 90 mcg/actuation inhaler Inhale 2 puffs every 4 hours by inhalation route. 8 g 2    apixaban (ELIQUIS) 5 mg Tab Take 1 tablet (5 mg total) by mouth 2 (two) times daily. Patient w/ thrombocytopenia <50, so  held during admission, follow-up with hematologist about restarting 180 tablet 1    atropine 1% (ISOPTO ATROPINE) 1 % Drop Place 1 drop into the left eye 2 (two) times a day. 15 mL 3    acetaZOLAMIDE (DIAMOX) 250 MG tablet take 1 tablet by mouth 3 times a day 90 tablet 3    blood sugar diagnostic (TRUE METRIX GLUCOSE TEST STRIP) Strp Use 1 strip to check blood glucose three times daily 100 each 11    blood-glucose meter (TRUE METRIX GLUCOSE METER) Misc Use to check blood glucose 1 each 0    blood-glucose meter,continuous (DEXCOM ) Misc USE WITH SENSORS 1 each 0    blood-glucose sensor (DEXCOM G7 SENSOR) Heather Use as directed for CGM. Change sensor every 10 days 1 each 11    blood-glucose sensor Heather CHANGE EVERY 10 DAYS 3 each 0    brimonidine 0.15 % OPTH DROP (ALPHAGAN) 0.15 % ophthalmic solution Place 1 drop into both eyes 3 (three) times daily. 15 mL 3    brinzolamide (AZOPT) 1 % ophthalmic suspension Place1 drop in left eye 3 times a day for 30 days 15 mL 6    busPIRone (BUSPAR) 10 MG tablet Take 1 tablet (10 mg total) by mouth 3 (three) times daily as needed (anxiety). 60 tablet 5    cetirizine (ZYRTEC) 10 MG tablet Take 1 tablet every day by oral route. 30 tablet 0    dorzolamide-timolol 2-0.5% (COSOPT) 22.3-6.8 mg/mL ophthalmic solution place 1 drop in left eye twice a day  for 30 days 10 mL 0    FLUoxetine 40 MG capsule Take 1 capsule every day by oral route. 30 capsule 2    fluticasone propionate (FLONASE ALLERGY RELIEF) 50 mcg/actuation nasal spray [place] 1 spray every day by intranasal route. 16 g 2    gabapentin (NEURONTIN) 300 MG capsule Take 2 capsules twice a day by oral route for 90 days. 360 capsule 1    hydrALAZINE (APRESOLINE) 25 MG tablet take 1 tablet by mouth every 8 hours 90 tablet 0    insulin glargine U-100, Lantus, (LANTUS SOLOSTAR U-100 INSULIN) 100 unit/mL (3 mL) InPn pen Inject 22 units every day by subcutaneous route in the evening for 30 days, for diabetes. 15 mL 2    insulin  "glargine U-100, Lantus, 100 unit/mL injection Inject 13 Units into the skin 2 (two) times a day.      lancets (TRUEPLUS LANCETS) 33 gauge Misc Use 1 to check blood glucose three times daily 100 each 11    lancets 33 gauge Misc Use to test twice daily 100 each 5    levETIRAcetam (KEPPRA) 500 MG Tab Take 1 tablet (500 mg total) by mouth 2 (two) times daily. 60 tablet 11    LORazepam (ATIVAN) 0.5 MG tablet Take 1 tablet 3 times a day by oral route for 7 days, for severe panic. 21 tablet 2    ondansetron (ZOFRAN-ODT) 4 MG TbDL Place 1 tablet (4 mg) on or under the tongue every 8 hours for 10 days. 24 tablet 2    oxyCODONE-acetaminophen (PERCOCET)  mg per tablet Take 1 tablet by mouth every 4 (four) hours as needed for Pain. 42 tablet 0    pantoprazole (PROTONIX) 40 MG tablet Take 1 tablet (40 mg total) by mouth once daily. 30 tablet 0    pen needle, diabetic 31 gauge x 3/16" Ndle Use as directed with insulin once daily 100 each 0    prednisoLONE acetate (PRED FORTE) 1 % DrpS Place 1 drop into the left eye 2 (two) times daily. 5 mL 3    predniSONE (DELTASONE) 20 MG tablet take 3 tablets Oral Daily,x30 day(s) (Patient taking differently: Take 20 mg by mouth once daily.) 90 tablet 0    promethazine (PHENERGAN) 25 MG tablet Take 1 tablet (25 mg total) by mouth every 6 (six) hours as needed. 15 tablet 0    sevelamer carbonate (RENVELA) 800 mg Tab Take 1 tablet (800 mg total) by mouth 3 (three) times daily with meals. 180 tablet 11    sucralfate (CARAFATE) 1 gram tablet Take 1 tablet (1 g total) by mouth 4 (four) times daily. 100 tablet 1    timolol maleate 0.5% (TIMOPTIC) 0.5 % Drop Place 1 drop in left eye 2 times a day for 30 days 5 mL 6    [DISCONTINUED] atenoloL (TENORMIN) 50 MG tablet Take 1 tablet (50 mg total) by mouth every other day. 45 tablet 3    [DISCONTINUED] insulin detemir U-100, Levemir, (LEVEMIR FLEXTOUCH U100 INSULIN) 100 unit/mL (3 mL) InPn pen Inject 22 units every day by subcutaneous route in the " evening for 90 days. 18 mL 1    [DISCONTINUED] omeprazole (PRILOSEC) 20 MG capsule Take 2 capsules (40 mg total) by mouth once daily. for 10 days 20 capsule 0       Scheduled meds:   brimonidine 0.15 % OPTH DROP  1 drop Both Eyes TID    busPIRone  10 mg Oral Daily    dorzolamide-timolol 2-0.5%  1 drop Both Eyes BID    FLUoxetine  10 mg Oral Daily    heparin (porcine)  5,000 Units Subcutaneous Q8H    hydrALAZINE  25 mg Oral Q8H    levETIRAcetam  500 mg Oral BID    mupirocin   Nasal BID    pantoprazole  40 mg Intravenous Daily    prednisoLONE acetate  1 drop Left Eye BID    sevelamer carbonate  800 mg Oral TID WM    sucralfate  1 g Oral QID    timolol maleate 0.5%  1 drop Left Eye BID       Infusions:   0.9% NaCl  1,000 mL Intravenous Continuous   Paused at 08/25/24 0559    D5 and 0.45% NaCl   Intravenous Continuous PRN 50 mL/hr at 08/25/24 0600 50 mL/hr at 08/25/24 0600       PRN meds:    Current Facility-Administered Medications:     acetaminophen, 650 mg, Oral, Q4H PRN    D5 and 0.45% NaCl, , Intravenous, Continuous PRN    dextrose 50%, 12.5 g, Intravenous, PRN    dextrose 50%, 12.5 g, Intravenous, PRN    dextrose 50%, 25 g, Intravenous, PRN    dextrose 50%, 25 g, Intravenous, PRN    glucagon (human recombinant), 1 mg, Intramuscular, PRN    glucose, 16 g, Oral, PRN    glucose, 24 g, Oral, PRN    HYDROmorphone, 1 mg, Intravenous, Q4H PRN    melatonin, 3 mg, Oral, Nightly PRN    ondansetron, 4 mg, Oral, Q6H PRN    oxyCODONE, 5 mg, Oral, Q4H PRN    prochlorperazine, 10 mg, Oral, Q6H PRN    sodium chloride 0.9%, 10 mL, Intravenous, PRN    sodium chloride 0.9%, 10 mL, Intravenous, PRN    Past Medical History:   Diagnosis Date    ESRD on hemodialysis 04/12/2022    Gastritis 12/2022    EGD was 12/5/22    Gastroparesis 04/12/2022    has not had Emptying study    Heart failure with preserved ejection fraction 04/12/2022    EF 55% on 3/22    History of supraventricular tachycardia     Hyperkalemia 07/07/2022    Hypertensive  emergency 2022    Sickle cell trait 2022    Type 2 diabetes mellitus      Past Surgical History:   Procedure Laterality Date     SECTION      x 3    COLONOSCOPY      COLONOSCOPY N/A 2022    Procedure: COLONOSCOPY;  Surgeon: Jagdeep Cedeno MD;  Location: [facility];  Service: Endoscopy;  Laterality: N/A;    DESTRUCTION, CILIARY BODY, USING LASER Left 2024    Procedure: Cyclophotocoagulation G-probe, retrobulbar chlorpromazine left eye;  Surgeon: Fidel Wise MD;  Location: [address];  Service: Ophthalmology;  Laterality: Left;    ENDOSCOPIC ULTRASOUND OF UPPER GASTROINTESTINAL TRACT N/A 2023    Procedure: ULTRASOUND, UPPER GI TRACT, ENDOSCOPIC;  Surgeon: Micky Paredes III, MD;  Location: [facility];  Service: Endoscopy;  Laterality: N/A;    ESOPHAGOGASTRODUODENOSCOPY N/A 10/18/2019    Procedure: ESOPHAGOGASTRODUODENOSCOPY (EGD);  Surgeon: Gianluca Mendez MD;  Location: [facility];  Service: Endoscopy;  Laterality: N/A;    ESOPHAGOGASTRODUODENOSCOPY N/A 2022    Procedure: EGD (ESOPHAGOGASTRODUODENOSCOPY);  Surgeon: Micky Paredes III, MD;  Location: [facility];  Service: Endoscopy;  Laterality: N/A;    ESOPHAGOGASTRODUODENOSCOPY N/A 2022    Procedure: EGD (ESOPHAGOGASTRODUODENOSCOPY);  Surgeon: Marcelo Zhong MD;  Location[facility];  Service: Endoscopy;  Laterality: N/A;    EYE SURGERY      INSERTION, CATHETER, TUNNELED N/A 2023    Procedure: Insertion,catheter,tunneled;  Surgeon: Carlos Thurman Jr., MD;  Location: [facility] OR;  Service: General;  Laterality: N/A;    LAPAROSCOPIC CHOLECYSTECTOMY N/A 2022    Procedure: CHOLECYSTECTOMY, LAPAROSCOPIC;  Surgeon: Grey Perez MD;  Location: [facility] OR;  Service: General;  Laterality: N/A;    PLACEMENT OF DUAL-LUMEN VASCULAR CATHETER Left 2022    Procedure: INSERTION-CATHETER-JOSEPH;  Surgeon: Dionte Gan MD;  Location: [facility] OR;  Service: General;  Laterality: Left;     PLACEMENT OF DUAL-LUMEN VASCULAR CATHETER Right 07/26/2022    Procedure: INSERTION-CATHETER-Hemosplit;  Surgeon: Dionte Gan MD;  Location: [facility];  Service: General;  Laterality: Right;     Family History   Problem Relation Name Age of Onset    Diabetes Mother      Diabetes Father       Social History     Tobacco Use    Smoking status: Never    Smokeless tobacco: Never   Substance Use Topics    Alcohol use: Not Currently    Drug use: No       OBJECTIVE:     Vital Signs and IO:  Temp:  [97.6 °F (36.4 °C)-98.7 °F (37.1 °C)]   Pulse:  []   Resp:  [10-44]   BP: (119-229)/()   SpO2:  [89 %-100 %]   I/O last 3 completed shifts:  In: 95.8 [I.V.:95.8]  Out: -   Wt Readings from Last 5 Encounters:   08/25/24 51 kg (112 lb 7 oz)   08/24/24 53.1 kg (117 lb)   08/22/24 53.1 kg (117 lb)   08/22/24 53.1 kg (117 lb)   08/20/24 54.4 kg (120 lb)     Body mass index is 20.56 kg/m².    Physical Exam  Constitutional:       General: Patient is not in acute distress.     Appearance: Patient is well-developed. She is not diaphoretic.   HENT:      Head: Normocephalic and atraumatic.      Mouth/Throat: Mucous membranes are moist.   Eyes:      General: No scleral icterus.     Pupils: Pupils are equal, round, and reactive to light.   Cardiovascular:      Rate and Rhythm: Normal rate and regular rhythm.   Pulmonary:      Effort: Pulmonary effort is normal. No respiratory distress.      Breath sounds: No stridor.   Abdominal:      General: There is no distension.      Palpations: Abdomen is soft.   Musculoskeletal:         General: No deformity. Normal range of motion.      Cervical back: Neck supple.   Skin:     General: Skin is warm and dry.      Findings: No rash present. No erythema.   Neurological:      Mental Status: Patient is alert and oriented to person, place, and time.      Cranial Nerves: No cranial nerve deficit.   Psychiatric:         Behavior: Behavior normal.          Patient care time was spent personally by  me on the following activities:     Obtaining a history.  Examination of patient.  Providing medical care at the patients bedside.  Developing a treatment plan with patient or surrogate and bedside caregivers.  Ordering and reviewing laboratory studies, radiographic studies, pulse oximetry.  Ordering and performing treatments and interventions.  Evaluation of patient's response to treatment.  Discussions with consultants while on the unit and immediately available to the patient.  Re-evaluation of the patient's condition.  Documentation in the medical record.     Mando Benitez MD    [facility]  [address] [place] [id number]    [phone] - tel  [phone] - fax    8/25/2024

## 2024-08-25 NOTE — ASSESSMENT & PLAN NOTE
Evidenced by gluc 300s, anion gap 28, beta-hydroxy 3.7, suspect serum bicarb is abnormally elevated for situation given contraction alkalosis, as patient was dialyzed today but likely hypovolemic from her ongoing GI sx. D/w Dr. Acevedo, he reports that he administered lantus at midnight, so recommend initiating insulin gtt w/o initial bolus; will run IVF at lower rate given her ESRD; monitor q4 BMP, but hold on lyte replacement protocol given ESRD and risk for over-correction. Very tenuous mgmt, and patient is critically ill requiring step down.

## 2024-08-25 NOTE — HPI
34F p/w 10/10 abdominal pain, located diffusely throughout, with associated vomiting. Patient reports that this level of pain is not typical for her gastroparesis. She reports she is unable to manage this discomfort at home. She presented for this discomfort to the [place] ED on 8/22/24 PM and was discharged early 8/23/24 AM; she reported that she never truly felt better, but she did go to HD on 8/24/24, and then decided to seek medical attention at the [facility] ED thereafter. She does not believe that she had additional fluid removed during the 8/24/24 HD session.

## 2024-08-25 NOTE — ASSESSMENT & PLAN NOTE
TThSat HD schedule; last dialyzed reportedly on 8/24/24. Rec consult renal tomorrow for HD possibly on Monday, given the volume that she may need to tx DKA.

## 2024-08-25 NOTE — ASSESSMENT & PLAN NOTE
Acute exacerbation of chronic sx may have put her into starvation ketosis and subsequent DKA. Tx aggressively.

## 2024-08-25 NOTE — NURSING
Pt transferred to 1120 via w/c accompanied per RN. Pt is aaox4 in NAD. Room air. Sats 95-97%. Pt ambulatory to w/c with standby assist. Ivs to right ac, right fa- h/l.  Pt's purse, , clothes, slippers sent with pt. Insulin pen (lantus) given to rec'ing RN, Gely. Pt sitting up in bed, eating dinner. VSS. No complaints voiced.

## 2024-08-25 NOTE — ED NOTES
Pt c/o abdominal pain 10/10. Pt tearful and uncomfortable. Stated she had dialysis today at 11:00, got home around 1600, about two hours later she began to have severe abdominal pain with vomiting.

## 2024-08-25 NOTE — ED PROVIDER NOTES
Encounter Date: 2024       History     Chief Complaint   Patient presents with    Abdominal Pain     Patient reports left lower abdominal pain and back pain since this morning.     HPI  34-year-old woman with a history of ESRD, gastroparesis, heart failure, diabetes presents for evaluation of left lower quadrant abdominal pain that has been ongoing since this morning.  This is a chronic issue for her.  She reports nonbilious nonbloody vomiting and nausea.  She denies chest pain or shortness of breath.  She reports getting a full session of dialysis earlier today.  She has been seen 6 times this month for similar presentations including earlier today.  Review of patient's allergies indicates:   Allergen Reactions    Droperidol Hives    Haldol [haloperidol lactate] Hives    Penicillins Hives     Past Medical History:   Diagnosis Date    ESRD on hemodialysis 2022    Gastritis 2022    EGD was 21    Gastroparesis 2022    has not had Emptying study    Heart failure with preserved ejection fraction 2022    EF 55% on 3/22    History of supraventricular tachycardia     Hyperkalemia 2022    Hypertensive emergency 2022    Sickle cell trait 2022    Type 2 diabetes mellitus      Past Surgical History:   Procedure Laterality Date     SECTION      x 3    COLONOSCOPY      COLONOSCOPY N/A 2022    Procedure: COLONOSCOPY;  Surgeon: Jagdeep Cedeno MD;  Location: [facility];  Service: Endoscopy;  Laterality: N/A;    DESTRUCTION, CILIARY BODY, USING LASER Left 3/8/2024    Procedure: Cyclophotocoagulation G-probe, retrobulbar chlorpromazine left eye;  Surgeon: Fidel Wise MD;  Location: [address];  Service: Ophthalmology;  Laterality: Left;    ENDOSCOPIC ULTRASOUND OF UPPER GASTROINTESTINAL TRACT N/A 2023    Procedure: ULTRASOUND, UPPER GI TRACT, ENDOSCOPIC;  Surgeon: Micky Paredes III, MD;  Location: [facility];  Service: Endoscopy;  Laterality: N/A;     ESOPHAGOGASTRODUODENOSCOPY N/A 10/18/2019    Procedure: ESOPHAGOGASTRODUODENOSCOPY (EGD);  Surgeon: Gianluca Mendez MD;  Location: [facility] ENDO;  Service: Endoscopy;  Laterality: N/A;    ESOPHAGOGASTRODUODENOSCOPY N/A 08/24/2022    Procedure: EGD (ESOPHAGOGASTRODUODENOSCOPY);  Surgeon: Micky Paredes III, MD;  Location: [facility] ENDO;  Service: Endoscopy;  Laterality: N/A;    ESOPHAGOGASTRODUODENOSCOPY N/A 12/5/2022    Procedure: EGD (ESOPHAGOGASTRODUODENOSCOPY);  Surgeon: Marcelo Zhong MD;  Location: [facility] ENDO;  Service: Endoscopy;  Laterality: N/A;    INSERTION, CATHETER, TUNNELED N/A 6/17/2023    Procedure: Insertion,catheter,tunneled;  Surgeon: Carlos Thurman Jr., MD;  Location: [facility] OR;  Service: General;  Laterality: N/A;    LAPAROSCOPIC CHOLECYSTECTOMY N/A 07/30/2022    Procedure: CHOLECYSTECTOMY, LAPAROSCOPIC;  Surgeon: Grey Perez MD;  Location: [facility] OR;  Service: General;  Laterality: N/A;    PLACEMENT OF DUAL-LUMEN VASCULAR CATHETER Left 07/12/2022    Procedure: INSERTION-CATHETER-JOSEPH;  Surgeon: Dionte Gan MD;  Location: [facility] OR;  Service: General;  Laterality: Left;    PLACEMENT OF DUAL-LUMEN VASCULAR CATHETER Right 07/26/2022    Procedure: INSERTION-CATHETER-Hemosplit;  Surgeon: Dionte Gan MD;  Location: [facility] OR;  Service: General;  Laterality: Right;     Family History   Problem Relation Name Age of Onset    Diabetes Mother      Diabetes Father       Social History     Tobacco Use    Smoking status: Never    Smokeless tobacco: Never   Substance Use Topics    Alcohol use: Not Currently    Drug use: No     Review of Systems   All other systems reviewed and are negative.      Physical Exam     Initial Vitals [08/24/24 1943]   BP Pulse Resp Temp SpO2   (!) 205/123 99 (!) 22 97.6 °F (36.4 °C) 100 %      MAP       --         Physical Exam    Nursing note and vitals reviewed.  Constitutional: She appears well-developed. She is not diaphoretic.   HENT:   Head: Normocephalic and  atraumatic.   Eyes: Right eye exhibits no discharge. Left eye exhibits no discharge.   Neck: No tracheal deviation present.   Cardiovascular:  Normal rate, regular rhythm and intact distal pulses.           Pulmonary/Chest: Breath sounds normal. No stridor. No respiratory distress.   Abdominal: Abdomen is soft. There is abdominal tenderness.   Soft abdomen with diffuse abdominal tenderness to palpation without rebound or guarding There is no rebound and no guarding.   Musculoskeletal:         General: No tenderness.     Neurological: She is alert and oriented to person, place, and time.   Skin: Skin is warm and dry.         ED Course   Procedures  Labs Reviewed   CBC W/ AUTO DIFFERENTIAL - Abnormal       Result Value    WBC 12.40      RBC 4.15      Hemoglobin 12.5      Hematocrit 36.8 (*)     MCV 89      MCH 30.1      MCHC 34.0      RDW 18.9 (*)     Platelets 180      MPV 10.2      Immature Granulocytes 0.9 (*)     Gran # (ANC) 10.2 (*)     Immature Grans (Abs) 0.11 (*)     Lymph # 1.0      Mono # 1.0      Eos # 0.1      Baso # 0.03      nRBC 0      Gran % 82.3 (*)     Lymph % 8.1 (*)     Mono % 7.7      Eosinophil % 0.8      Basophil % 0.2      Differential Method Automated     COMPREHENSIVE METABOLIC PANEL - Abnormal    Sodium 137      Potassium 3.3 (*)     Chloride 88 (*)     CO2 21 (*)     Glucose 327 (*)     BUN 19      Creatinine 3.0 (*)     Calcium 9.9      Total Protein 8.7 (*)     Albumin 4.9      Total Bilirubin 2.6 (*)     Alkaline Phosphatase 129      AST 21      ALT 36      eGFR 20.2 (*)     Anion Gap 28 (*)    BETA - HYDROXYBUTYRATE, SERUM - Abnormal    Beta-Hydroxybutyrate 3.7 (*)    POCT GLUCOSE - Abnormal    POC Glucose 336 (*)    ISTAT PROCEDURE - Abnormal    POC PH 7.425      POC PCO2 34.7 (*)     POC PO2 52      POC HCO3 22.8 (*)     POC BE -2      POC SATURATED O2 88      POC TCO2 24      Sample VENOUS      Site Other      Allens Test N/A      DelSys Room Air      Mode SPONT     POCT GLUCOSE -  Abnormal    POC Glucose 382 (*)    LIPASE    Lipase 20     HCG, QUANTITATIVE   HCG, QUANTITATIVE    HCG Quant 1.67     BETA - HYDROXYBUTYRATE, SERUM   URINALYSIS, REFLEX TO URINE CULTURE   POCT GLUCOSE MONITORING CONTINUOUS          Imaging Results    None          Medications   ondansetron injection 4 mg (has no administration in time range)   ketorolac injection 30 mg (has no administration in time range)   HYDROmorphone injection 0.5 mg (0.5 mg Intravenous Given 8/24/24 2143)   famotidine (PF) injection 20 mg (20 mg Intravenous Given 8/24/24 2143)   prochlorperazine injection Soln 5 mg (5 mg Intravenous Given 8/24/24 2142)   HYDROmorphone injection 0.5 mg (0.5 mg Intravenous Given 8/24/24 2344)   insulin glargine U-100 (Lantus) pen 22 Units (22 Units Subcutaneous Given 8/25/24 0009)     Medical Decision Making  Abdominal pain, acute on chronic, hypertensive, otherwise stable, she desats when she sleeps but satting well on room air at went to wait, on exam she is uncomfortable but nontoxic appearing, abdomen is soft but diffusely tender to palpation.  Differential includes functional abdominal pain and gastroparesis metabolic or endocrine derangement.  She has had 2 CT scans in the last 3 days, they did not show acute surgical pathology.  I do not think she needs a repeat CT scan.  We will try to treat her symptomatically and check her metabolic profile.  She is not hyperkalemic, she is borderline hypokalemia but I'm hesitant to replete with her esrd.  She is hyperglycemic and has elevated beta.  Her VBG shows a normal pH.  I do not think she is in DKA requiring insulin drip, I discussed with hospital medicine, plan for sliding scale.  I think this is a mixed metabolic derangement with her nausea vomiting hyperglycemia ESRD.  I was hesitant to give her fluids as she has already gotten fluids earlier today and makes no urine. I gave her her long-acting insulin.  I do not think she needs an insulin drip at  present.    Amount and/or Complexity of Data Reviewed  External Data Reviewed: notes.     Details: Seen multiple times this week for similar presentation  Labs: ordered. Decision-making details documented in ED Course.    Risk  Prescription drug management.  Decision regarding hospitalization.  Diagnosis or treatment significantly limited by social determinants of health.               ED Course as of 08/25/24 0305   Sun Aug 25, 2024   0228 I do not think she is in DKA, her pH is 7.42. [IC]      ED Course User Index  [IC] Chico Acevedo MD                           Clinical Impression:  Final diagnoses:  [R10.9] Abdominal pain, unspecified abdominal location (Primary)  [R11.2] Nausea and vomiting, unspecified vomiting type  [N18.6] ESRD (end stage renal disease)  [R73.9] Hyperglycemia  [E87.20] Acidosis          ED Disposition Condition    Admit Stable                Chico Acevedo MD  08/25/24 0305       Chico Acevedo MD  08/25/24 2009

## 2024-08-25 NOTE — PLAN OF CARE
[facility]  Initial Discharge Assessment       Primary Care Provider: Melony Kim NP    Admission Diagnosis: DKA, type 1 [E10.10]    Admission Date: 8/24/2024  Expected Discharge Date:   Assessment completed at bedside with pt , and all information on FaceSheet confirmed, including demographics, PCP, pharmacy and insurance.  Pt has not addressed advance directives, and reports her mother is POA / NOK.  She currently lives with her minor children .  Her PCP  NP KAYLA Kim , and preferred pharmacy is [facility]  .   She denies any HH/HD/Blood Thinners.  For transportation to appointments, she usually receives assistance from her mother. .  For transportation at discharge, her mother will provide.  Plan for discharge is to discharge home with no current needs at this time.  Case Management to continue to follow for discharge planning needs.     Transition of Care Barriers: (P) None    Payor: MEDICAID / Plan: [place] / Product Type: Managed Medicaid /     Extended Emergency Contact Information  Primary Emergency Contact: Tanya Howard  Address: [address] [place], [place] [address] [facility]  Home Phone: [phone]  Mobile Phone: [phone]  Relation: Step parent  Preferred language: English   needed? No  Secondary Emergency Contact: Garcia Howard  Address: [address] [place] [address]  Mobile Phone: [phone]  Relation: Father  Preferred language: English   needed? No  Mother: Svetlana Freire  Mobile Phone: [phone]    Discharge Plan A: (P) Home  Discharge Plan B: (P) Home      [facility]  [address]  [facility] [address]  Phone: [phone] Fax: [phone]      Initial Assessment (most recent)       Adult Discharge Assessment - 08/25/24 1407          Discharge Assessment    Assessment Type Discharge Planning Assessment (P)      Confirmed/corrected address, phone number and  insurance Yes (P)      Confirmed Demographics Correct on Facesheet (P)      Source of Information patient (P)      If unable to respond/provide information was family/caregiver contacted? No Contact Information Available (P)      When was your last doctors appointment? -- (P)    Pt. last appointment was two weeks ago. Pt does not know her next schedule  appointment.    Reason For Admission Type 1 diabetes mellitus with ketoacidosis without coma (P)      People in Home child(tammy), dependent (P)      Do you expect to return to your current living situation? Yes (P)      Do you have help at home or someone to help you manage your care at home? Yes (P)      Who are your caregiver(s) and their phone number(s)? Tanya- Step parent (P)      Prior to hospitilization cognitive status: Alert/Oriented (P)      Current cognitive status: Alert/Oriented (P)      Walking or Climbing Stairs Difficulty yes (P)      Walking or Climbing Stairs -- (P)    Pt states she is walking up a mountain.    Dressing/Bathing Difficulty no (P)      Home Accessibility wheelchair accessible (P)      Home Layout Able to live on 1st floor (P)      Equipment Currently Used at Home none (P)      Readmission within 30 days? No (P)      Patient currently being followed by outpatient case management? No (P)      Do you currently have service(s) that help you manage your care at home? No (P)      Do you take prescription medications? Yes (P)      Do you have prescription coverage? Yes (P)      Coverage Medicaid Healthy Blue [facility] (P)      Do you have any problems affording any of your prescribed medications? No (P)      Is the patient taking medications as prescribed? yes (P)      Who is going to help you get home at discharge? pt's step mother (P)      How do you get to doctors appointments? family or friend will provide (P)      Are you on dialysis? Yes (P)      [facility] and [facility] (P)      Discharge  Plan A Home (P)      Discharge Plan B Home (P)      DME Needed Upon Discharge  none (P)      Discharge Plan discussed with: Patient (P)      Transition of Care Barriers None (P)         Physical Activity    On average, how many days per week do you engage in moderate to strenuous exercise (like a brisk walk)? 0 days (P)      On average, how many minutes do you engage in exercise at this level? 0 min (P)         Financial Resource Strain    How hard is it for you to pay for the very basics like food, housing, medical care, and heating? Somewhat hard (P)         Housing Stability    In the last 12 months, was there a time when you were not able to pay the mortgage or rent on time? Yes (P)      At any time in the past 12 months, were you homeless or living in a shelter (including now)? No (P)         Transportation Needs    Has the lack of transportation kept you from medical appointments, meetings, work or from getting things needed for daily living? No (P)         Food Insecurity    Within the past 12 months, you worried that your food would run out before you got the money to buy more. Often true (P)      Within the past 12 months, the food you bought just didn't last and you didn't have money to get more. Often true (P)         Stress    Do you feel stress - tense, restless, nervous, or anxious, or unable to sleep at night because your mind is troubled all the time - these days? Rather much (P)         Social Isolation    How often do you feel lonely or isolated from those around you?  Often (P)         Alcohol Use    Q1: How often do you have a drink containing alcohol? Never (P)      Q2: How many drinks containing alcohol do you have on a typical day when you are drinking? Patient does not drink (P)      Q3: How often do you have six or more drinks on one occasion? Never (P)         Utilities    In the past 12 months has the electric, gas, oil, or water company threatened to shut off services in your home? No (P)          Health Literacy    How often do you need to have someone help you when you read instructions, pamphlets, or other written material from your doctor or pharmacy? Sometimes (P)         OTHER    Name(s) of People in Home pt's minor children. (P)

## 2024-08-25 NOTE — NURSING
Nurses Note -- 4 Eyes      8/25/2024   6:20 PM      Skin assessed during: Transfer      [x] No Altered Skin Integrity Present    []Prevention Measures Documented      [] Yes- Altered Skin Integrity Present or Discovered   [] LDA Added if Not in Epic (Describe Wound)   [] New Altered Skin Integrity was Present on Admit and Documented in LDA   [] Wound Image Taken    Wound Care Consulted? No    Attending Nurse:  KINGSLEY Hong    Second RN/Staff Member:  Tayler Interiano RN

## 2024-08-25 NOTE — SUBJECTIVE & OBJECTIVE
Past Medical History:   Diagnosis Date    ESRD on hemodialysis 2022    Gastritis 2022    EGD was 22    Gastroparesis 2022    has not had Emptying study    Heart failure with preserved ejection fraction 2022    EF 55% on 3/22    History of supraventricular tachycardia     Hyperkalemia 2022    Hypertensive emergency 2022    Sickle cell trait 2022    Type 2 diabetes mellitus        Past Surgical History:   Procedure Laterality Date     SECTION      x 3    COLONOSCOPY      COLONOSCOPY N/A 2022    Procedure: COLONOSCOPY;  Surgeon: Jagdeep Cedeno MD;  Location: [facility];  Service: Endoscopy;  Laterality: N/A;    DESTRUCTION, CILIARY BODY, USING LASER Left 3/8/2024    Procedure: Cyclophotocoagulation G-probe, retrobulbar chlorpromazine left eye;  Surgeon: Fidel Wise MD;  Location: [address];  Service: Ophthalmology;  Laterality: Left;    ENDOSCOPIC ULTRASOUND OF UPPER GASTROINTESTINAL TRACT N/A 2023    Procedure: ULTRASOUND, UPPER GI TRACT, ENDOSCOPIC;  Surgeon: Micky Paredes III, MD;  Location: [facility];  Service: Endoscopy;  Laterality: N/A;    ESOPHAGOGASTRODUODENOSCOPY N/A 10/18/2019    Procedure: ESOPHAGOGASTRODUODENOSCOPY (EGD);  Surgeon: Gianluca Mendez MD;  [facility];  Service: Endoscopy;  Laterality: N/A;    ESOPHAGOGASTRODUODENOSCOPY N/A 2022    Procedure: EGD (ESOPHAGOGASTRODUODENOSCOPY);  Surgeon: Micky Paredes III, MD;  Location: [facility];  Service: Endoscopy;  Laterality: N/A;    ESOPHAGOGASTRODUODENOSCOPY N/A 2022    Procedure: EGD (ESOPHAGOGASTRODUODENOSCOPY);  Surgeon: Marcelo Zhong MD;  Location: [facility];  Service: Endoscopy;  Laterality: N/A;    INSERTION, CATHETER, TUNNELED N/A 2023    Procedure: Insertion,catheter,tunneled;  Surgeon: Carlos Thurman Jr., MD;  Location: [facility];  Service: General;  Laterality: N/A;    LAPAROSCOPIC CHOLECYSTECTOMY N/A 2022    Procedure:  CHOLECYSTECTOMY, LAPAROSCOPIC;  Surgeon: Grey Perez MD;  Location: [facility] OR;  Service: General;  Laterality: N/A;    PLACEMENT OF DUAL-LUMEN VASCULAR CATHETER Left 07/12/2022    Procedure: INSERTION-CATHETER-JOSEPH;  Surgeon: Dionte Gan MD;  Location: [facility] OR;  Service: General;  Laterality: Left;    PLACEMENT OF DUAL-LUMEN VASCULAR CATHETER Right 07/26/2022    Procedure: INSERTION-CATHETER-Hemosplit;  Surgeon: Dionte Gan MD;  Location: [facility] OR;  Service: General;  Laterality: Right;       Review of patient's allergies indicates:   Allergen Reactions    Droperidol Hives    Haldol [haloperidol lactate] Hives    Penicillins Hives       Current Facility-Administered Medications on File Prior to Encounter   Medication    [COMPLETED] HYDROmorphone injection 1 mg    [COMPLETED] insulin regular injection 5 Units 0.05 mL    [COMPLETED] morphine injection 4 mg    [COMPLETED] promethazine (PHENERGAN) 12.5 mg in 0.9% NaCl 50 mL IVPB     Current Outpatient Medications on File Prior to Encounter   Medication Sig    acetaZOLAMIDE (DIAMOX) 250 MG tablet take 1 tablet by mouth 3 times a day    albuterol (VENTOLIN HFA) 90 mcg/actuation inhaler Inhale 2 puffs every 4 hours by inhalation route.    apixaban (ELIQUIS) 5 mg Tab Take 1 tablet (5 mg total) by mouth 2 (two) times daily. Patient w/ thrombocytopenia <50, so held during admission, follow-up with hematologist about restarting    atropine 1% (ISOPTO ATROPINE) 1 % Drop Place 1 drop into the left eye 2 (two) times a day.    blood sugar diagnostic (TRUE METRIX GLUCOSE TEST STRIP) Strp Use 1 strip to check blood glucose three times daily    blood-glucose meter (TRUE METRIX GLUCOSE METER) Misc Use to check blood glucose    blood-glucose meter,continuous (DEXCOM ) Misc USE WITH SENSORS    blood-glucose sensor (DEXCOM G7 SENSOR) Heather Use as directed for CGM. Change sensor every 10 days    blood-glucose sensor Heather CHANGE EVERY 10 DAYS    brimonidine 0.15 % OPTH  "DROP (ALPHAGAN) 0.15 % ophthalmic solution Place 1 drop into both eyes 3 (three) times daily.    brinzolamide (AZOPT) 1 % ophthalmic suspension Place1 drop in left eye 3 times a day for 30 days    busPIRone (BUSPAR) 10 MG tablet Take 1 tablet (10 mg total) by mouth 3 (three) times daily as needed (anxiety).    cetirizine (ZYRTEC) 10 MG tablet Take 1 tablet every day by oral route.    dorzolamide-timolol 2-0.5% (COSOPT) 22.3-6.8 mg/mL ophthalmic solution place 1 drop in left eye twice a day  for 30 days    FLUoxetine 40 MG capsule Take 1 capsule every day by oral route.    fluticasone propionate (FLONASE ALLERGY RELIEF) 50 mcg/actuation nasal spray [place] 1 spray every day by intranasal route.    gabapentin (NEURONTIN) 300 MG capsule Take 2 capsules twice a day by oral route for 90 days.    hydrALAZINE (APRESOLINE) 25 MG tablet take 1 tablet by mouth every 8 hours    insulin glargine U-100, Lantus, (LANTUS SOLOSTAR U-100 INSULIN) 100 unit/mL (3 mL) InPn pen Inject 22 units every day by subcutaneous route in the evening for 30 days, for diabetes.    insulin glargine U-100, Lantus, 100 unit/mL injection Inject 13 Units into the skin 2 (two) times a day.    lancets (TRUEPLUS LANCETS) 33 gauge Misc Use 1 to check blood glucose three times daily    lancets 33 gauge Misc Use to test twice daily    levETIRAcetam (KEPPRA) 500 MG Tab Take 1 tablet (500 mg total) by mouth 2 (two) times daily.    LORazepam (ATIVAN) 0.5 MG tablet Take 1 tablet 3 times a day by oral route for 7 days, for severe panic.    ondansetron (ZOFRAN-ODT) 4 MG TbDL Place 1 tablet (4 mg) on or under the tongue every 8 hours for 10 days.    oxyCODONE-acetaminophen (PERCOCET)  mg per tablet Take 1 tablet by mouth every 4 (four) hours as needed for Pain.    pantoprazole (PROTONIX) 40 MG tablet Take 1 tablet (40 mg total) by mouth once daily.    pen needle, diabetic 31 gauge x 3/16" Ndle Use as directed with insulin once daily    prednisoLONE acetate (PRED " FORTE) 1 % DrpS Place 1 drop into the left eye 2 (two) times daily.    predniSONE (DELTASONE) 20 MG tablet take 3 tablets Oral Daily,x30 day(s) (Patient taking differently: Take 20 mg by mouth once daily.)    promethazine (PHENERGAN) 25 MG tablet Take 1 tablet (25 mg total) by mouth every 6 (six) hours as needed.    sevelamer carbonate (RENVELA) 800 mg Tab Take 1 tablet (800 mg total) by mouth 3 (three) times daily with meals.    sucralfate (CARAFATE) 1 gram tablet Take 1 tablet (1 g total) by mouth 4 (four) times daily.    timolol maleate 0.5% (TIMOPTIC) 0.5 % Drop Place 1 drop in left eye 2 times a day for 30 days    [DISCONTINUED] atenoloL (TENORMIN) 50 MG tablet Take 1 tablet (50 mg total) by mouth every other day.    [DISCONTINUED] insulin detemir U-100, Levemir, (LEVEMIR FLEXTOUCH U100 INSULIN) 100 unit/mL (3 mL) InPn pen Inject 22 units every day by subcutaneous route in the evening for 90 days.    [DISCONTINUED] omeprazole (PRILOSEC) 20 MG capsule Take 2 capsules (40 mg total) by mouth once daily. for 10 days     Family History       Problem Relation (Age of Onset)    Diabetes Mother, Father          Tobacco Use    Smoking status: Never    Smokeless tobacco: Never   Substance and Sexual Activity    Alcohol use: Not Currently    Drug use: No    Sexual activity: Not Currently     Partners: Male     Birth control/protection: I.U.D.     Review of Systems   Constitutional:  Positive for activity change and appetite change. Negative for fever.   Cardiovascular:  Negative for chest pain, palpitations and leg swelling.   Gastrointestinal:  Positive for abdominal pain, nausea and vomiting. Negative for constipation.   Genitourinary:  Positive for decreased urine volume.   Psychiatric/Behavioral:  Positive for agitation.      Objective:     Vital Signs (Most Recent):  Temp: 97.6 °F (36.4 °C) (08/24/24 1943)  Pulse: 93 (08/25/24 0330)  Resp: 18 (08/25/24 0334)  BP: (!) 181/105 (08/25/24 0330)  SpO2: 96 % (08/25/24  "0330) Vital Signs (24h Range):  Temp:  [97.6 °F (36.4 °C)-98.2 °F (36.8 °C)] 97.6 °F (36.4 °C)  Pulse:  [] 93  Resp:  [12-29] 18  SpO2:  [85 %-100 %] 96 %  BP: (120-229)/() 181/105     Weight: 53.1 kg (117 lb)  Body mass index is 21.4 kg/m².     Physical Exam  Vitals and nursing note reviewed.   Constitutional:       General: She is in acute distress.      Appearance: She is ill-appearing. She is not toxic-appearing or diaphoretic.   HENT:      Mouth/Throat:      Mouth: Mucous membranes are dry.   Cardiovascular:      Rate and Rhythm: Regular rhythm. Tachycardia present.      Heart sounds: Murmur (I/VI systolic at all valves) heard.      Comments: Palpable thrill LUE AVF  Pulmonary:      Effort: Pulmonary effort is normal. No respiratory distress.      Breath sounds: Normal breath sounds. No wheezing or rhonchi.   Abdominal:      General: There is no distension.      Palpations: Abdomen is soft.      Tenderness: There is abdominal tenderness (diffusely throughout). There is no guarding or rebound.   Musculoskeletal:      Right lower leg: No edema.      Left lower leg: No edema.      Comments: Diffuse muscle wasting   Skin:     Findings: No rash.   Neurological:      General: No focal deficit present.                Significant Labs: All pertinent labs within the past 24 hours have been reviewed.  BMP:   Recent Labs   Lab 08/24/24  2131   *      K 3.3*   CL 88*   CO2 21*   BUN 19   CREATININE 3.0*   CALCIUM 9.9     CBC:   Recent Labs   Lab 08/24/24  0739 08/24/24  2131   WBC 8.24 12.40   HGB 11.2* 12.5   HCT 33.6* 36.8*    180     Cardiac Markers: No results for input(s): "CKMB", "MYOGLOBIN", "BNP", "TROPISTAT" in the last 48 hours.    Significant Imaging: I have reviewed all pertinent imaging results/findings within the past 24 hours.  "

## 2024-08-25 NOTE — CARE UPDATE
Patient admitted with DKA. Off pathway. Transitioned to SSI. Advanced to clear liquid diet.. SSI started. Resume lantus if tolerating oral diet. Very brittle type 1 diabetic. Dropped glucose on insulin drip to 50-60s. Infusion shut off. Monitoring closely in ICU but if stable can transfer to floor.

## 2024-08-25 NOTE — NURSING
Nurses Note -- 4 Eyes      8/25/2024   7:00 AM      Skin assessed during: Admit      [x] No Altered Skin Integrity Present    [x]Prevention Measures Documented    Heels elevated.       [] Yes- Altered Skin Integrity Present or Discovered   [] LDA Added if Not in Epic (Describe Wound)   [] New Altered Skin Integrity was Present on Admit and Documented in LDA   [] Wound Image Taken    Wound Care Consulted? No    Attending Nurse:  Francis Woo RN/Staff Member:  Tayler Interiano RN

## 2024-08-25 NOTE — H&P
[facility] - Emergency Dept  Hospital Medicine  History & Physical    Patient Name: Tabby Howard  MRN: [id number]  Patient Class: IP- Inpatient  Admission Date: 2024  Attending Physician: Nura Del Valle MD   Primary Care Provider: Melony Kim NP         Patient information was obtained from patient and ER records.     Subjective:     Principal Problem:Type 1 diabetes mellitus with ketoacidosis without coma    Chief Complaint:   Chief Complaint   Patient presents with    Abdominal Pain     Patient reports left lower abdominal pain and back pain since this morning.        HPI: 34F p/w 10/10 abdominal pain, located diffusely throughout, with associated vomiting. Patient reports that this level of pain is not typical for her gastroparesis. She reports she is unable to manage this discomfort at home. She presented for this discomfort to the [place] ED on 24 PM and was discharged early 24 AM; she reported that she never truly felt better, but she did go to HD on 24, and then decided to seek medical attention at the [facility] ED thereafter. She does not believe that she had additional fluid removed during the 24 HD session.    Past Medical History:   Diagnosis Date    ESRD on hemodialysis 2022    Gastritis 2022    EGD was 22    Gastroparesis 2022    has not had Emptying study    Heart failure with preserved ejection fraction 2022    EF 55% on 3/22    History of supraventricular tachycardia     Hyperkalemia 2022    Hypertensive emergency 2022    Sickle cell trait 2022    Type 2 diabetes mellitus        Past Surgical History:   Procedure Laterality Date     SECTION      x 3    COLONOSCOPY      COLONOSCOPY N/A 2022    Procedure: COLONOSCOPY;  Surgeon: Jagdeep Cedeno MD;  Location: [facility] ENDO;  Service: Endoscopy;  Laterality: N/A;    DESTRUCTION, CILIARY BODY, USING LASER Left 3/8/2024    Procedure: Cyclophotocoagulation  G-probe, retrobulbar chlorpromazine left eye;  Surgeon: Fidel Wise MD;  Location: [address];  Service: Ophthalmology;  Laterality: Left;    ENDOSCOPIC ULTRASOUND OF UPPER GASTROINTESTINAL TRACT N/A 12/16/2023    Procedure: ULTRASOUND, UPPER GI TRACT, ENDOSCOPIC;  Surgeon: Micky Paredes III, MD;  Location: [facility];  Service: Endoscopy;  Laterality: N/A;    ESOPHAGOGASTRODUODENOSCOPY N/A 10/18/2019    Procedure: ESOPHAGOGASTRODUODENOSCOPY (EGD);  Surgeon: Gianluca Mendez MD;  Location: [facility];  Service: Endoscopy;  Laterality: N/A;    ESOPHAGOGASTRODUODENOSCOPY N/A 08/24/2022    Procedure: EGD (ESOPHAGOGASTRODUODENOSCOPY);  Surgeon: Micky Paredes III, MD;  Location: [facility];  Service: Endoscopy;  Laterality: N/A;    ESOPHAGOGASTRODUODENOSCOPY N/A 12/5/2022    Procedure: EGD (ESOPHAGOGASTRODUODENOSCOPY);  Surgeon: Marcelo Zhong MD;  Location: [facility];  Service: Endoscopy;  Laterality: N/A;    INSERTION, CATHETER, TUNNELED N/A 6/17/2023    Procedure: Insertion,catheter,tunneled;  Surgeon: Carlos Thurman Jr., MD;  Location: [facility];  Service: General;  Laterality: N/A;    LAPAROSCOPIC CHOLECYSTECTOMY N/A 07/30/2022    Procedure: CHOLECYSTECTOMY, LAPAROSCOPIC;  Surgeon: Grey Perez MD;  Location: [facility] OR;  Service: General;  Laterality: N/A;    PLACEMENT OF DUAL-LUMEN VASCULAR CATHETER Left 07/12/2022    Procedure: INSERTION-CATHETER-JOSEPH;  Surgeon: Dionte Gan MD;  Location: [facility] OR;  Service: General;  Laterality: Left;    PLACEMENT OF DUAL-LUMEN VASCULAR CATHETER Right 07/26/2022    Procedure: INSERTION-CATHETER-Hemosplit;  Surgeon: Dionte Gan MD;  Location: [facility] OR;  Service: General;  Laterality: Right;       Review of patient's allergies indicates:   Allergen Reactions    Droperidol Hives    Haldol [haloperidol lactate] Hives    Penicillins Hives       Current Facility-Administered Medications on File Prior to Encounter   Medication    [COMPLETED]  HYDROmorphone injection 1 mg    [COMPLETED] insulin regular injection 5 Units 0.05 mL    [COMPLETED] morphine injection 4 mg    [COMPLETED] promethazine (PHENERGAN) 12.5 mg in 0.9% NaCl 50 mL IVPB     Current Outpatient Medications on File Prior to Encounter   Medication Sig    acetaZOLAMIDE (DIAMOX) 250 MG tablet take 1 tablet by mouth 3 times a day    albuterol (VENTOLIN HFA) 90 mcg/actuation inhaler Inhale 2 puffs every 4 hours by inhalation route.    apixaban (ELIQUIS) 5 mg Tab Take 1 tablet (5 mg total) by mouth 2 (two) times daily. Patient w/ thrombocytopenia <50, so held during admission, follow-up with hematologist about restarting    atropine 1% (ISOPTO ATROPINE) 1 % Drop Place 1 drop into the left eye 2 (two) times a day.    blood sugar diagnostic (TRUE METRIX GLUCOSE TEST STRIP) Strp Use 1 strip to check blood glucose three times daily    blood-glucose meter (TRUE METRIX GLUCOSE METER) Misc Use to check blood glucose    blood-glucose meter,continuous (DEXCOM ) Misc USE WITH SENSORS    blood-glucose sensor (DEXCOM G7 SENSOR) Heather Use as directed for CGM. Change sensor every 10 days    blood-glucose sensor Heather CHANGE EVERY 10 DAYS    brimonidine 0.15 % OPTH DROP (ALPHAGAN) 0.15 % ophthalmic solution Place 1 drop into both eyes 3 (three) times daily.    brinzolamide (AZOPT) 1 % ophthalmic suspension Place1 drop in left eye 3 times a day for 30 days    busPIRone (BUSPAR) 10 MG tablet Take 1 tablet (10 mg total) by mouth 3 (three) times daily as needed (anxiety).    cetirizine (ZYRTEC) 10 MG tablet Take 1 tablet every day by oral route.    dorzolamide-timolol 2-0.5% (COSOPT) 22.3-6.8 mg/mL ophthalmic solution place 1 drop in left eye twice a day  for 30 days    FLUoxetine 40 MG capsule Take 1 capsule every day by oral route.    fluticasone propionate (FLONASE ALLERGY RELIEF) 50 mcg/actuation nasal spray [place] 1 spray every day by intranasal route.    gabapentin (NEURONTIN) 300 MG capsule Take 2  "capsules twice a day by oral route for 90 days.    hydrALAZINE (APRESOLINE) 25 MG tablet take 1 tablet by mouth every 8 hours    insulin glargine U-100, Lantus, (LANTUS SOLOSTAR U-100 INSULIN) 100 unit/mL (3 mL) InPn pen Inject 22 units every day by subcutaneous route in the evening for 30 days, for diabetes.    insulin glargine U-100, Lantus, 100 unit/mL injection Inject 13 Units into the skin 2 (two) times a day.    lancets (TRUEPLUS LANCETS) 33 gauge Misc Use 1 to check blood glucose three times daily    lancets 33 gauge Misc Use to test twice daily    levETIRAcetam (KEPPRA) 500 MG Tab Take 1 tablet (500 mg total) by mouth 2 (two) times daily.    LORazepam (ATIVAN) 0.5 MG tablet Take 1 tablet 3 times a day by oral route for 7 days, for severe panic.    ondansetron (ZOFRAN-ODT) 4 MG TbDL Place 1 tablet (4 mg) on or under the tongue every 8 hours for 10 days.    oxyCODONE-acetaminophen (PERCOCET)  mg per tablet Take 1 tablet by mouth every 4 (four) hours as needed for Pain.    pantoprazole (PROTONIX) 40 MG tablet Take 1 tablet (40 mg total) by mouth once daily.    pen needle, diabetic 31 gauge x 3/16" Ndle Use as directed with insulin once daily    prednisoLONE acetate (PRED FORTE) 1 % DrpS Place 1 drop into the left eye 2 (two) times daily.    predniSONE (DELTASONE) 20 MG tablet take 3 tablets Oral Daily,x30 day(s) (Patient taking differently: Take 20 mg by mouth once daily.)    promethazine (PHENERGAN) 25 MG tablet Take 1 tablet (25 mg total) by mouth every 6 (six) hours as needed.    sevelamer carbonate (RENVELA) 800 mg Tab Take 1 tablet (800 mg total) by mouth 3 (three) times daily with meals.    sucralfate (CARAFATE) 1 gram tablet Take 1 tablet (1 g total) by mouth 4 (four) times daily.    timolol maleate 0.5% (TIMOPTIC) 0.5 % Drop Place 1 drop in left eye 2 times a day for 30 days    [DISCONTINUED] atenoloL (TENORMIN) 50 MG tablet Take 1 tablet (50 mg total) by mouth every other day.    [DISCONTINUED] " insulin detemir U-100, Levemir, (LEVEMIR FLEXTOUCH U100 INSULIN) 100 unit/mL (3 mL) InPn pen Inject 22 units every day by subcutaneous route in the evening for 90 days.    [DISCONTINUED] omeprazole (PRILOSEC) 20 MG capsule Take 2 capsules (40 mg total) by mouth once daily. for 10 days     Family History       Problem Relation (Age of Onset)    Diabetes Mother, Father          Tobacco Use    Smoking status: Never    Smokeless tobacco: Never   Substance and Sexual Activity    Alcohol use: Not Currently    Drug use: No    Sexual activity: Not Currently     Partners: Male     Birth control/protection: I.U.D.     Review of Systems   Constitutional:  Positive for activity change and appetite change. Negative for fever.   Cardiovascular:  Negative for chest pain, palpitations and leg swelling.   Gastrointestinal:  Positive for abdominal pain, nausea and vomiting. Negative for constipation.   Genitourinary:  Positive for decreased urine volume.   Psychiatric/Behavioral:  Positive for agitation.      Objective:     Vital Signs (Most Recent):  Temp: 97.6 °F (36.4 °C) (08/24/24 1943)  Pulse: 93 (08/25/24 0330)  Resp: 18 (08/25/24 0334)  BP: (!) 181/105 (08/25/24 0330)  SpO2: 96 % (08/25/24 0330) Vital Signs (24h Range):  Temp:  [97.6 °F (36.4 °C)-98.2 °F (36.8 °C)] 97.6 °F (36.4 °C)  Pulse:  [] 93  Resp:  [12-29] 18  SpO2:  [85 %-100 %] 96 %  BP: (120-229)/() 181/105     Weight: 53.1 kg (117 lb)  Body mass index is 21.4 kg/m².     Physical Exam  Vitals and nursing note reviewed.   Constitutional:       General: She is in acute distress.      Appearance: She is ill-appearing. She is not toxic-appearing or diaphoretic.   HENT:      Mouth/Throat:      Mouth: Mucous membranes are dry.   Cardiovascular:      Rate and Rhythm: Regular rhythm. Tachycardia present.      Heart sounds: Murmur (I/VI systolic at all valves) heard.      Comments: Palpable thrill LUE AVF  Pulmonary:      Effort: Pulmonary effort is normal. No  "respiratory distress.      Breath sounds: Normal breath sounds. No wheezing or rhonchi.   Abdominal:      General: There is no distension.      Palpations: Abdomen is soft.      Tenderness: There is abdominal tenderness (diffusely throughout). There is no guarding or rebound.   Musculoskeletal:      Right lower leg: No edema.      Left lower leg: No edema.      Comments: Diffuse muscle wasting   Skin:     Findings: No rash.   Neurological:      General: No focal deficit present.                Significant Labs: All pertinent labs within the past 24 hours have been reviewed.  BMP:   Recent Labs   Lab 08/24/24  2131   *      K 3.3*   CL 88*   CO2 21*   BUN 19   CREATININE 3.0*   CALCIUM 9.9     CBC:   Recent Labs   Lab 08/24/24  0739 08/24/24  2131   WBC 8.24 12.40   HGB 11.2* 12.5   HCT 33.6* 36.8*    180     Cardiac Markers: No results for input(s): "CKMB", "MYOGLOBIN", "BNP", "TROPISTAT" in the last 48 hours.    Significant Imaging: I have reviewed all pertinent imaging results/findings within the past 24 hours.  Assessment/Plan:   34F p/w abd pain in setting of known DM1 gastroparesis, likely triggering DKA.    * Type 1 diabetes mellitus with ketoacidosis without coma  Evidenced by gluc 300s, anion gap 28, beta-hydroxy 3.7, suspect serum bicarb is abnormally elevated for situation given contraction alkalosis, as patient was dialyzed today but likely hypovolemic from her ongoing GI sx. D/w Dr. Acevedo, he reports that he administered lantus at midnight, so recommend initiating insulin gtt w/o initial bolus; will run IVF at lower rate given her ESRD; monitor q4 BMP, but hold on lyte replacement protocol given ESRD and risk for over-correction. Very tenuous mgmt, and patient is critically ill requiring step down.      Hypertension secondary to other renal disorders  No e/o HTN emergency, likely reactive from abd sx/pain, restart home Rx.      Anemia in ESRD (end-stage renal disease)  Hgb stable, " but likely demonstrating hemoconcentration.    Vitreous hemorrhage, both eyes  Cont home gtt.      ESRD (end stage renal disease)  TThSat HD schedule; last dialyzed reportedly on 8/24/24. Rec consult renal tomorrow for HD possibly on Monday, given the volume that she may need to tx DKA.    Gastroparesis - suspected, unconfirmed  Acute exacerbation of chronic sx may have put her into starvation ketosis and subsequent DKA. Tx aggressively.        VTE Risk Mitigation (From admission, onward)           Ordered     heparin (porcine) injection 5,000 Units  Every 8 hours         08/25/24 0405     IP VTE HIGH RISK PATIENT  Once         08/25/24 0405     Place sequential compression device  Until discontinued         08/25/24 0405                     Fully expect > 2 midnights reasonable hospital care for DKA mgmt; 45 minutes of critical care time spent on 8/25/24 dx primary issue and initiating tailored dka mgmt for ESRD patient.               SHIRLEY BENNETT MD  Department of Hospital Medicine  [facility] - Emergency Dept

## 2024-08-26 VITALS
SYSTOLIC BLOOD PRESSURE: 158 MMHG | RESPIRATION RATE: 19 BRPM | DIASTOLIC BLOOD PRESSURE: 90 MMHG | WEIGHT: 112.44 LBS | HEIGHT: 62 IN | TEMPERATURE: 98 F | BODY MASS INDEX: 20.69 KG/M2 | OXYGEN SATURATION: 100 % | HEART RATE: 82 BPM

## 2024-08-26 LAB
ANION GAP SERPL CALC-SCNC: 9 MMOL/L (ref 8–16)
BASOPHILS # BLD AUTO: 0.01 K/UL (ref 0–0.2)
BASOPHILS NFR BLD: 0.1 % (ref 0–1.9)
BUN SERPL-MCNC: 32 MG/DL (ref 6–20)
CALCIUM SERPL-MCNC: 8.5 MG/DL (ref 8.7–10.5)
CHLORIDE SERPL-SCNC: 100 MMOL/L (ref 95–110)
CO2 SERPL-SCNC: 32 MMOL/L (ref 23–29)
CREAT SERPL-MCNC: 6 MG/DL (ref 0.5–1.4)
DIFFERENTIAL METHOD BLD: ABNORMAL
EOSINOPHIL # BLD AUTO: 0.2 K/UL (ref 0–0.5)
EOSINOPHIL NFR BLD: 2.2 % (ref 0–8)
ERYTHROCYTE [DISTWIDTH] IN BLOOD BY AUTOMATED COUNT: 18.6 % (ref 11.5–14.5)
EST. GFR  (NO RACE VARIABLE): 8.8 ML/MIN/1.73 M^2
GLUCOSE SERPL-MCNC: 103 MG/DL (ref 70–110)
GLUCOSE SERPL-MCNC: 58 MG/DL (ref 70–110)
GLUCOSE SERPL-MCNC: 89 MG/DL (ref 70–110)
HCT VFR BLD AUTO: 33.1 % (ref 37–48.5)
HGB BLD-MCNC: 11 G/DL (ref 12–16)
IMM GRANULOCYTES # BLD AUTO: 0.03 K/UL (ref 0–0.04)
IMM GRANULOCYTES NFR BLD AUTO: 0.4 % (ref 0–0.5)
LYMPHOCYTES # BLD AUTO: 1 K/UL (ref 1–4.8)
LYMPHOCYTES NFR BLD: 12 % (ref 18–48)
MAGNESIUM SERPL-MCNC: 2 MG/DL (ref 1.6–2.6)
MCH RBC QN AUTO: 30 PG (ref 27–31)
MCHC RBC AUTO-ENTMCNC: 33.2 G/DL (ref 32–36)
MCV RBC AUTO: 90 FL (ref 82–98)
MONOCYTES # BLD AUTO: 0.7 K/UL (ref 0.3–1)
MONOCYTES NFR BLD: 9.2 % (ref 4–15)
NEUTROPHILS # BLD AUTO: 6.2 K/UL (ref 1.8–7.7)
NEUTROPHILS NFR BLD: 76.1 % (ref 38–73)
NRBC BLD-RTO: 0 /100 WBC
PLATELET # BLD AUTO: 135 K/UL (ref 150–450)
PMV BLD AUTO: 10.4 FL (ref 9.2–12.9)
POTASSIUM SERPL-SCNC: 5.2 MMOL/L (ref 3.5–5.1)
RBC # BLD AUTO: 3.67 M/UL (ref 4–5.4)
SODIUM SERPL-SCNC: 141 MMOL/L (ref 136–145)
WBC # BLD AUTO: 8.08 K/UL (ref 3.9–12.7)

## 2024-08-26 PROCEDURE — 27000221 HC OXYGEN, UP TO 24 HOURS

## 2024-08-26 PROCEDURE — 99900035 HC TECH TIME PER 15 MIN (STAT)

## 2024-08-26 PROCEDURE — 36415 COLL VENOUS BLD VENIPUNCTURE: CPT | Performed by: FAMILY MEDICINE

## 2024-08-26 PROCEDURE — 94761 N-INVAS EAR/PLS OXIMETRY MLT: CPT

## 2024-08-26 PROCEDURE — 25000003 PHARM REV CODE 250: Performed by: HOSPITALIST

## 2024-08-26 PROCEDURE — 63600175 PHARM REV CODE 636 W HCPCS: Performed by: HOSPITALIST

## 2024-08-26 PROCEDURE — 85025 COMPLETE CBC W/AUTO DIFF WBC: CPT | Performed by: FAMILY MEDICINE

## 2024-08-26 PROCEDURE — 80048 BASIC METABOLIC PNL TOTAL CA: CPT | Performed by: FAMILY MEDICINE

## 2024-08-26 PROCEDURE — 83735 ASSAY OF MAGNESIUM: CPT | Performed by: FAMILY MEDICINE

## 2024-08-26 PROCEDURE — 99900031 HC PATIENT EDUCATION (STAT)

## 2024-08-26 RX ORDER — HYDRALAZINE HYDROCHLORIDE 20 MG/ML
20 INJECTION INTRAMUSCULAR; INTRAVENOUS EVERY 4 HOURS PRN
Status: DISCONTINUED | OUTPATIENT
Start: 2024-08-26 | End: 2024-08-26 | Stop reason: HOSPADM

## 2024-08-26 RX ORDER — INSULIN ASPART 100 [IU]/ML
8 INJECTION, SOLUTION INTRAVENOUS; SUBCUTANEOUS
Qty: 15 ML | Refills: 0 | Status: SHIPPED | OUTPATIENT
Start: 2024-08-26

## 2024-08-26 RX ORDER — BRINZOLAMIDE 10 MG/ML
1 SUSPENSION/ DROPS OPHTHALMIC 3 TIMES DAILY
Status: DISCONTINUED | OUTPATIENT
Start: 2024-08-26 | End: 2024-08-26

## 2024-08-26 RX ADMIN — HYDROMORPHONE HYDROCHLORIDE 1 MG: 1 INJECTION, SOLUTION INTRAMUSCULAR; INTRAVENOUS; SUBCUTANEOUS at 08:08

## 2024-08-26 RX ADMIN — HYDROMORPHONE HYDROCHLORIDE 1 MG: 1 INJECTION, SOLUTION INTRAMUSCULAR; INTRAVENOUS; SUBCUTANEOUS at 04:08

## 2024-08-26 RX ADMIN — PREDNISOLONE ACETATE 1 DROP: 10 SUSPENSION/ DROPS OPHTHALMIC at 07:08

## 2024-08-26 RX ADMIN — FLUOXETINE 10 MG: 10 CAPSULE ORAL at 07:08

## 2024-08-26 RX ADMIN — SEVELAMER CARBONATE 800 MG: 800 TABLET, FILM COATED ORAL at 07:08

## 2024-08-26 RX ADMIN — TIMOLOL MALEATE 1 DROP: 5 SOLUTION/ DROPS OPHTHALMIC at 09:08

## 2024-08-26 RX ADMIN — SUCRALFATE 1 G: 1 TABLET ORAL at 07:08

## 2024-08-26 RX ADMIN — BUSPIRONE HYDROCHLORIDE 10 MG: 5 TABLET ORAL at 07:08

## 2024-08-26 RX ADMIN — SEVELAMER CARBONATE 800 MG: 800 TABLET, FILM COATED ORAL at 12:08

## 2024-08-26 RX ADMIN — BRIMONIDINE TARTRATE OPHTHALMIC SOLUTION, 0.15% 1 DROP: 1.5 SOLUTION/ DROPS OPHTHALMIC at 09:08

## 2024-08-26 RX ADMIN — HYDRALAZINE HYDROCHLORIDE 25 MG: 25 TABLET ORAL at 02:08

## 2024-08-26 RX ADMIN — HEPARIN SODIUM 5000 UNITS: 5000 INJECTION INTRAVENOUS; SUBCUTANEOUS at 05:08

## 2024-08-26 RX ADMIN — HYDRALAZINE HYDROCHLORIDE 25 MG: 25 TABLET ORAL at 05:08

## 2024-08-26 RX ADMIN — SUCRALFATE 1 G: 1 TABLET ORAL at 12:08

## 2024-08-26 RX ADMIN — PANTOPRAZOLE SODIUM 40 MG: 40 INJECTION, POWDER, FOR SOLUTION INTRAVENOUS at 08:08

## 2024-08-26 RX ADMIN — LEVETIRACETAM 500 MG: 500 TABLET, FILM COATED ORAL at 07:08

## 2024-08-26 NOTE — PLAN OF CARE
Patient cleared for discharge by case management to home, no needs.       08/26/24 150   Final Note   Assessment Type Final Discharge Note   Anticipated Discharge Disposition Home   Hospital Resources/Appts/Education Provided Appointments scheduled and added to AVS

## 2024-08-26 NOTE — CONSULTS
Nephrology Consult Note        Patient Name: Tabby Howard  MRN: [id number]    Patient Class: IP- Inpatient   Admission Date: 8/24/2024  Length of Stay: 1 days  Date of Service: 8/26/2024    Attending Physician: Madai Butler DO  Primary Care Provider: Melony Kim NP    Reason for Consult: esrd    SUBJECTIVE:     HPI: 34F with ESRD on HD TTS and known diabetic gastroparesis with frequent admissions p/w 10/10 abdominal pain, located diffusely throughout, with associated vomiting. She is unable to manage this discomfort at home. She presented for this to the [facility] ED on 8/22/24 PM and was discharged early 8/23/24 AM; she never truly felt better, but she did go to have HD on 8/24/24, and then decided to seek medical attention at the [facility] ED thereafter. No emeregncy HD needs. Was admitted to ICU for DKA management.    8/26  hypertensive episodes, afebrile,  no acute events    Review of Systems:  Constitutional:  Negative for chills, fever, malaise/fatigue and weight loss.   HENT:  Negative for hearing loss and nosebleeds.    Eyes:  Negative for blurred vision, double vision and photophobia.   Respiratory:  Negative for cough, shortness of breath and wheezing.    Cardiovascular:  Negative for chest pain, palpitations and leg swelling.   Gastrointestinal:  Negative for abdominal pain, constipation, diarrhea, heartburn, nausea and vomiting.   Genitourinary:  Negative for dysuria, frequency and urgency.   Musculoskeletal:  Negative for falls, joint pain and myalgias.   Skin:  Negative for itching and rash.   Neurological:  Negative for dizziness, speech change, focal weakness, loss of consciousness and headaches.   Endo/Heme/Allergies:  Does not bruise/bleed easily.   Psychiatric/Behavioral:  Negative for depression and substance abuse. The patient is not nervous/anxious.      ASSESSMENT/PLAN:     ESRD on HD TTS via AVF  Next dialysis per schedule or PRN.  Continue current dialysis prescription and adjust as  needed.  Renal diet - low K, low phos as tolerated.  No IVs or BP checks on access and/or non-dominant arm.    Anemia of CKD  Hgb and HCT are acceptable. Monitor for now.  Will provide SHELLEY and/or IV iron as needed to keep Hgb 8-10 range.    MBD / Secondary HPT  Ca, Phos, PTH and vitamin D levels are acceptable.   Phos binders, vitamin D and analogues, calcimimetics will be given as needed.    HTN  Tolerate asymptomatic HTN up to -160.  Continue home meds.  Low sodium diet as tolerated.  Prn iv hydralazine for bp excesses  Fluid restrict     Gastroparesis, DKA  Management per primary team.    Hyperkalemia  --low k diet  --2 k dialysate  --tight glycemic control as able    Thank you for allowing us to participate in the care of your patient!   We will follow the patient and provide recommendations as needed.         Laboratory:  Recent Labs   Lab 08/25/24  0849 08/25/24  1756 08/26/24  0801    142 141   K 3.0* 3.4* 5.2*   CL 99 100 100   CO2 32* 31* 32*   BUN 26* 29* 32*   CREATININE 4.0* 4.7* 6.0*   GLU 80 108 58*       Recent Labs   Lab 08/22/24  1826 08/24/24  0739 08/24/24  2131 08/25/24  0849 08/25/24  1756 08/26/24  0801   CALCIUM 9.4 9.5 9.9 8.8 8.8 8.5*   ALBUMIN 3.6 3.7 4.9 3.5  --   --    PHOS 2.3*  --   --   --   --   --    MG 1.6  --   --  2.0  --  2.0       Recent Labs   Lab 07/08/22  0516   PTH, Intact 289.8 H       Recent Labs   Lab 08/20/24  0954 08/20/24  1122 08/22/24  0600 08/22/24  0802 08/22/24  0914 08/22/24  1759 08/22/24  2137 08/22/24  2257 08/24/24  0829 08/24/24  0958   POCTGLUCOSE >500* 356* >500* >500* 289* 88 65* 118* 354* 286*       Recent Labs   Lab 03/03/23  0547 08/01/23  0813 06/23/24  0712   Hemoglobin A1C 5.8 5.8 8.5 H       Recent Labs   Lab 08/24/24  2131 08/25/24  0849 08/26/24  0801   WBC 12.40 6.93 8.08   HGB 12.5 10.4* 11.0*   HCT 36.8* 31.2* 33.1*    139* 135*   MCV 89 89 90   MCHC 34.0 33.3 33.2   MONO 7.7  1.0 11.4  0.8 9.2  0.7   EOSINOPHIL 0.8 1.0  2.2       Recent Labs   Lab 08/24/24  0739 08/24/24  2131 08/25/24  0849   BILITOT 2.1* 2.6* 1.4*   PROT 7.3 8.7* 5.9*   ALBUMIN 3.7 4.9 3.5   ALKPHOS 124 129 93   ALT 38 36 20   AST 17 21 13       Recent Labs   Lab 03/16/22  1848 05/15/22  2022 02/05/23  0156 09/19/23  1422 10/13/23  1001   Color, UA Pale Yellow   < > Yellow Yellow Yellow   Appearance, UA  --    < > Clear Clear Clear   pH, UA  --    < > >8.0 A >8.0 A 8.0   Specific Gravity, UA  --    < > 1.020 1.020 1.015   Protein, UA  --    < > 3+ A 4+ 4+   Glucose, UA 50 mg/dL A   < > 2+ A 4+ A 3+ A   Ketones, UA  --    < > 1+ A Trace A Trace A   Urobilinogen, UA Normal   < > Negative Negative Negative   Bilirubin (UA) Negative   < > 2+ A Negative 1+ A   Occult Blood UA  --    < > 3+ A 2+ A Negative   Blood, UA 0.03 mg/dL A   < >  --   --   --    Nitrite, UA  --    < > Negative Negative Negative   RBC, UA  --    < > 15 H 17 H 8 H   WBC, UA  --    < > 1 2 5   Bacteria  --    < > Rare Negative Negative   Mucus, UA Rare A  --   --   --   --    Hyaline Casts, UA  --    < > 2 A 1 2 A    < > = values in this interval not displayed.       Recent Labs   Lab 10/15/23  0353 02/24/24  1855 07/16/24  1618 08/22/24  0704 08/24/24  2230   POC PH 7.239 LL  --   --  7.471 H 7.425   POC PCO2 37.8  --   --  37.3 34.7 L   POC HCO3 16.1 L  --   --  27.2 22.8 L   POC PO2 37 LL  --   --  40 52   POC SATURATED O2 60  --   --  79 88   POC BE -11  --   --  4 H -2   Sample VENOUS   < > VENOUS VENOUS VENOUS    < > = values in this interval not displayed.       Microbiology Results (last 7 days)       ** No results found for the last 168 hours. **            Review of patient's allergies indicates:   Allergen Reactions    Droperidol Hives    Haldol [haloperidol lactate] Hives    Penicillins Hives       Outpatient meds:  No current facility-administered medications on file prior to encounter.     Current Outpatient Medications on File Prior to Encounter   Medication Sig Dispense Refill     albuterol (VENTOLIN HFA) 90 mcg/actuation inhaler Inhale 2 puffs every 4 hours by inhalation route. 8 g 2    apixaban (ELIQUIS) 5 mg Tab Take 1 tablet (5 mg total) by mouth 2 (two) times daily. Patient w/ thrombocytopenia <50, so held during admission, follow-up with hematologist about restarting 180 tablet 1    atropine 1% (ISOPTO ATROPINE) 1 % Drop Place 1 drop into the left eye 2 (two) times a day. 15 mL 3    acetaZOLAMIDE (DIAMOX) 250 MG tablet take 1 tablet by mouth 3 times a day 90 tablet 3    blood sugar diagnostic (TRUE METRIX GLUCOSE TEST STRIP) Strp Use 1 strip to check blood glucose three times daily 100 each 11    blood-glucose meter (TRUE METRIX GLUCOSE METER) Misc Use to check blood glucose 1 each 0    blood-glucose meter,continuous (DEXCOM ) Misc USE WITH SENSORS 1 each 0    blood-glucose sensor (DEXCOM G7 SENSOR) Heather Use as directed for CGM. Change sensor every 10 days 1 each 11    blood-glucose sensor Heather CHANGE EVERY 10 DAYS 3 each 0    brimonidine 0.15 % OPTH DROP (ALPHAGAN) 0.15 % ophthalmic solution Place 1 drop into both eyes 3 (three) times daily. 15 mL 3    brinzolamide (AZOPT) 1 % ophthalmic suspension Place1 drop in left eye 3 times a day for 30 days 15 mL 6    busPIRone (BUSPAR) 10 MG tablet Take 1 tablet (10 mg total) by mouth 3 (three) times daily as needed (anxiety). 60 tablet 5    cetirizine (ZYRTEC) 10 MG tablet Take 1 tablet every day by oral route. 30 tablet 0    dorzolamide-timolol 2-0.5% (COSOPT) 22.3-6.8 mg/mL ophthalmic solution place 1 drop in left eye twice a day  for 30 days 10 mL 0    FLUoxetine 40 MG capsule Take 1 capsule every day by oral route. 30 capsule 2    fluticasone propionate (FLONASE ALLERGY RELIEF) 50 mcg/actuation nasal spray [place] 1 spray every day by intranasal route. 16 g 2    gabapentin (NEURONTIN) 300 MG capsule Take 2 capsules twice a day by oral route for 90 days. 360 capsule 1    hydrALAZINE (APRESOLINE) 25 MG tablet take 1 tablet by mouth  "every 8 hours 90 tablet 0    insulin glargine U-100, Lantus, (LANTUS SOLOSTAR U-100 INSULIN) 100 unit/mL (3 mL) InPn pen Inject 22 units every day by subcutaneous route in the evening for 30 days, for diabetes. 15 mL 2    insulin glargine U-100, Lantus, 100 unit/mL injection Inject 13 Units into the skin 2 (two) times a day.      lancets (TRUEPLUS LANCETS) 33 gauge Misc Use 1 to check blood glucose three times daily 100 each 11    lancets 33 gauge Misc Use to test twice daily 100 each 5    levETIRAcetam (KEPPRA) 500 MG Tab Take 1 tablet (500 mg total) by mouth 2 (two) times daily. 60 tablet 11    LORazepam (ATIVAN) 0.5 MG tablet Take 1 tablet 3 times a day by oral route for 7 days, for severe panic. 21 tablet 2    ondansetron (ZOFRAN-ODT) 4 MG TbDL Place 1 tablet (4 mg) on or under the tongue every 8 hours for 10 days. 24 tablet 2    oxyCODONE-acetaminophen (PERCOCET)  mg per tablet Take 1 tablet by mouth every 4 (four) hours as needed for Pain. 42 tablet 0    pantoprazole (PROTONIX) 40 MG tablet Take 1 tablet (40 mg total) by mouth once daily. 30 tablet 0    pen needle, diabetic 31 gauge x 3/16" Ndle Use as directed with insulin once daily 100 each 0    prednisoLONE acetate (PRED FORTE) 1 % DrpS Place 1 drop into the left eye 2 (two) times daily. 5 mL 3    predniSONE (DELTASONE) 20 MG tablet take 3 tablets Oral Daily,x30 day(s) (Patient taking differently: Take 20 mg by mouth once daily.) 90 tablet 0    promethazine (PHENERGAN) 25 MG tablet Take 1 tablet (25 mg total) by mouth every 6 (six) hours as needed. 15 tablet 0    sevelamer carbonate (RENVELA) 800 mg Tab Take 1 tablet (800 mg total) by mouth 3 (three) times daily with meals. 180 tablet 11    sucralfate (CARAFATE) 1 gram tablet Take 1 tablet (1 g total) by mouth 4 (four) times daily. 100 tablet 1    timolol maleate 0.5% (TIMOPTIC) 0.5 % Drop Place 1 drop in left eye 2 times a day for 30 days 5 mL 6    [DISCONTINUED] atenoloL (TENORMIN) 50 MG tablet " Take 1 tablet (50 mg total) by mouth every other day. 45 tablet 3    [DISCONTINUED] insulin detemir U-100, Levemir, (LEVEMIR FLEXTOUCH U100 INSULIN) 100 unit/mL (3 mL) InPn pen Inject 22 units every day by subcutaneous route in the evening for 90 days. 18 mL 1    [DISCONTINUED] omeprazole (PRILOSEC) 20 MG capsule Take 2 capsules (40 mg total) by mouth once daily. for 10 days 20 capsule 0       Scheduled meds:   brimonidine 0.15 % OPTH DROP  1 drop Both Eyes TID    busPIRone  10 mg Oral Daily    FLUoxetine  10 mg Oral Daily    heparin (porcine)  5,000 Units Subcutaneous Q8H    hydrALAZINE  25 mg Oral Q8H    insulin glargine U-100  20 Units Subcutaneous QHS    levETIRAcetam  500 mg Oral BID    mupirocin   Nasal BID    pantoprazole  40 mg Intravenous Daily    prednisoLONE acetate  1 drop Left Eye BID    sevelamer carbonate  800 mg Oral TID WM    sucralfate  1 g Oral QID    timolol maleate 0.5%  1 drop Left Eye BID       Infusions:   D5 and 0.45% NaCl   Intravenous Continuous PRN 50 mL/hr at 08/25/24 0600 50 mL/hr at 08/25/24 0600       PRN meds:    Current Facility-Administered Medications:     acetaminophen, 650 mg, Oral, Q4H PRN    D5 and 0.45% NaCl, , Intravenous, Continuous PRN    dextrose 50%, 12.5 g, Intravenous, PRN    dextrose 50%, 12.5 g, Intravenous, PRN    dextrose 50%, 12.5 g, Intravenous, PRN    dextrose 50%, 25 g, Intravenous, PRN    dextrose 50%, 25 g, Intravenous, PRN    glucagon (human recombinant), 1 mg, Intramuscular, PRN    glucagon (human recombinant), 1 mg, Intramuscular, PRN    glucose, 16 g, Oral, PRN    glucose, 16 g, Oral, PRN    glucose, 24 g, Oral, PRN    glucose, 24 g, Oral, PRN    HYDROmorphone, 1 mg, Intravenous, Q4H PRN    insulin aspart U-100, 0-10 Units, Subcutaneous, QID (AC + HS) PRN    melatonin, 3 mg, Oral, Nightly PRN    ondansetron, 4 mg, Oral, Q6H PRN    oxyCODONE, 5 mg, Oral, Q4H PRN    prochlorperazine, 10 mg, Oral, Q6H PRN    sodium chloride 0.9%, 10 mL, Intravenous, PRN     sodium chloride 0.9%, 10 mL, Intravenous, PRN    Past Medical History:   Diagnosis Date    ESRD on hemodialysis 2022    Gastritis 2022    EGD was 22    Gastroparesis 2022    has not had Emptying study    Heart failure with preserved ejection fraction 2022    EF 55% on 3/22    History of supraventricular tachycardia     Hyperkalemia 2022    Hypertensive emergency 2022    Sickle cell trait 2022    Type 2 diabetes mellitus      Past Surgical History:   Procedure Laterality Date     SECTION      x 3    COLONOSCOPY      COLONOSCOPY N/A 2022    Procedure: COLONOSCOPY;  Surgeon: Jagdeep Cedeno MD;  Location: [facility];  Service: Endoscopy;  Laterality: N/A;    DESTRUCTION, CILIARY BODY, USING LASER Left 2024    Procedure: Cyclophotocoagulation G-probe, retrobulbar chlorpromazine left eye;  Surgeon: Fidel Wise MD;  Location: [address];  Service: Ophthalmology;  Laterality: Left;    ENDOSCOPIC ULTRASOUND OF UPPER GASTROINTESTINAL TRACT N/A 2023    Procedure: ULTRASOUND, UPPER GI TRACT, ENDOSCOPIC;  Surgeon: Micky Paredes III, MD;  Location: [facility];  Service: Endoscopy;  Laterality: N/A;    ESOPHAGOGASTRODUODENOSCOPY N/A 10/18/2019    Procedure: ESOPHAGOGASTRODUODENOSCOPY (EGD);  Surgeon: Gianluca Mendez MD;  Location: [facility];  Service: Endoscopy;  Laterality: N/A;    ESOPHAGOGASTRODUODENOSCOPY N/A 2022    Procedure: EGD (ESOPHAGOGASTRODUODENOSCOPY);  Surgeon: Micky Paredes III, MD;  Location: [facility];  Service: Endoscopy;  Laterality: N/A;    ESOPHAGOGASTRODUODENOSCOPY N/A 2022    Procedure: EGD (ESOPHAGOGASTRODUODENOSCOPY);  Surgeon: Marcelo Zhong MD;  Location: [facility];  Service: Endoscopy;  Laterality: N/A;    EYE SURGERY      INSERTION, CATHETER, TUNNELED N/A 2023    Procedure: Insertion,catheter,tunneled;  Surgeon: Carlos Thurman Jr., MD;  Location: [facility];  Service: General;   Laterality: N/A;    LAPAROSCOPIC CHOLECYSTECTOMY N/A 07/30/2022    Procedure: CHOLECYSTECTOMY, LAPAROSCOPIC;  Surgeon: Grey Perez MD;  Location: [facility] OR;  Service: General;  Laterality: N/A;    PLACEMENT OF DUAL-LUMEN VASCULAR CATHETER Left 07/12/2022    Procedure: INSERTION-CATHETER-JOSEPH;  Surgeon: Dionte Gan MD;  Location: [facility] OR;  Service: General;  Laterality: Left;    PLACEMENT OF DUAL-LUMEN VASCULAR CATHETER Right 07/26/2022    Procedure: INSERTION-CATHETER-Hemosplit;  Surgeon: Dionte Gan MD;  Location: [facility] OR;  Service: General;  Laterality: Right;     Family History   Problem Relation Name Age of Onset    Diabetes Mother      Diabetes Father       Social History     Tobacco Use    Smoking status: Never    Smokeless tobacco: Never   Substance Use Topics    Alcohol use: Not Currently    Drug use: No       OBJECTIVE:     Vital Signs and IO:  Temp:  [97.6 °F (36.4 °C)-98.9 °F (37.2 °C)]   Pulse:  [78-90]   Resp:  [1-29]   BP: (123-177)/()   SpO2:  [92 %-100 %]   I/O last 3 completed shifts:  In: 1063 [P.O.:480; I.V.:583]  Out: 0   Wt Readings from Last 5 Encounters:   08/25/24 51 kg (112 lb 7 oz)   08/24/24 53.1 kg (117 lb)   08/22/24 53.1 kg (117 lb)   08/22/24 53.1 kg (117 lb)   08/20/24 54.4 kg (120 lb)     Body mass index is 20.56 kg/m².    Physical Exam  Constitutional:       General: Patient is not in acute distress.     Appearance: Patient is well-developed. She is not diaphoretic.   HENT:      Head: Normocephalic and atraumatic.      Mouth/Throat: Mucous membranes are moist.   Eyes:      General: No scleral icterus.     Pupils: Pupils are equal, round, and reactive to light.   Cardiovascular:      Rate and Rhythm: Normal rate and regular rhythm.   Pulmonary:      Effort: Pulmonary effort is normal. No respiratory distress.      Breath sounds: No stridor.   Abdominal:      General: There is no distension.      Palpations: Abdomen is soft.   Musculoskeletal:         General: No  deformity. Normal range of motion.      Cervical back: Neck supple.   Skin:     General: Skin is warm and dry.      Findings: No rash present. No erythema.   Neurological:      Mental Status: Patient is alert and oriented to person, place, and time.      Cranial Nerves: No cranial nerve deficit.   Psychiatric:         Behavior: Behavior normal.          Patient care time was spent personally by me on the following activities:     Obtaining a history.  Examination of patient.  Providing medical care at the patients bedside.  Developing a treatment plan with patient or surrogate and bedside caregivers.  Ordering and reviewing laboratory studies, radiographic studies, pulse oximetry.  Ordering and performing treatments and interventions.  Evaluation of patient's response to treatment.  Discussions with consultants while on the unit and immediately available to the patient.  Re-evaluation of the patient's condition.  Documentation in the medical record.     Hugh Figueroa MD    [facility]  [address] [place] [address]  [place] [address]    [phone] - tel  [phone] - fax    8/26/2024

## 2024-08-26 NOTE — PLAN OF CARE
Problem: Violence Risk or Actual  Goal: Anger and Impulse Control  Outcome: Progressing     Problem: Adult Inpatient Plan of Care  Goal: Plan of Care Review  Outcome: Progressing  Goal: Patient-Specific Goal (Individualized)  Outcome: Progressing  Goal: Absence of Hospital-Acquired Illness or Injury  Outcome: Progressing  Goal: Optimal Comfort and Wellbeing  Outcome: Progressing  Goal: Readiness for Transition of Care  Outcome: Progressing     Problem: Diabetes Comorbidity  Goal: Blood Glucose Level Within Targeted Range  Outcome: Progressing     Problem: Fall Injury Risk  Goal: Absence of Fall and Fall-Related Injury  Outcome: Progressing     Problem: Hemodialysis  Goal: Safe, Effective Therapy Delivery  Outcome: Progressing  Goal: Effective Tissue Perfusion  Outcome: Progressing  Goal: Absence of Infection Signs and Symptoms  Outcome: Progressing

## 2024-08-26 NOTE — PLAN OF CARE
08/26/24 0827   Patient Assessment/Suction   Level of Consciousness (AVPU) alert   Respiratory Effort Normal;Unlabored   Expansion/Accessory Muscles/Retractions no use of accessory muscles   All Lung Fields Breath Sounds Anterior:;Lateral:;equal bilaterally   Rhythm/Pattern, Respiratory pattern regular   Cough Frequency no cough   Skin Integrity   $ Wound Care Tech Time 15 min   Area Observed Left;Right;Behind ear   Skin Appearance without discoloration   PRE-TX-O2   Device (Oxygen Therapy) nasal cannula   $ Is the patient on Low Flow Oxygen? Yes   Flow (L/min) (Oxygen Therapy) 2   Oxygen Concentration (%) 28   SpO2 95 %   Pulse Oximetry Type Intermittent   $ Pulse Oximetry - Multiple Charge Pulse Oximetry - Multiple   Pulse 87   Resp 18   General Safety Checklist   Safety Promotion/Fall Prevention side rails raised   Ready to Wean/Extubation Screen   FIO2<=50 (chart decimal) 0.28   Education   $ Education Oxygen;15 min

## 2024-08-26 NOTE — DISCHARGE SUMMARY
[facility] Medicine  Discharge Summary      Patient Name: Tabby Howard  MRN: [id number]  [facility]: [id number]  Patient Class: IP- Inpatient  Admission Date: 8/24/2024  Hospital Length of Stay: 1 days  Discharge Date and Time:  08/26/2024 5:13 PM  Attending Physician: Madai Butler DO   Discharging Provider: Madai Butler DO  Primary Care Provider: Melony Kim NP    Primary Care Team: Networked reference to record PCT     HPI:   34F p/w 10/10 abdominal pain, located diffusely throughout, with associated vomiting. Patient reports that this level of pain is not typical for her gastroparesis. She reports she is unable to manage this discomfort at home. She presented for this discomfort to the [place] ED on 8/22/24 PM and was discharged early 8/23/24 AM; she reported that she never truly felt better, but she did go to HD on 8/24/24, and then decided to seek medical attention at the [facility] ED thereafter. She does not believe that she had additional fluid removed during the 8/24/24 HD session.    * No surgery found *      Hospital Course:   34F p/w 10/10 abdominal pain, located diffusely throughout, with associated vomiting. Patient reports that this level of pain is not typical for her gastroparesis. She reports she is unable to manage this discomfort at home. She presented for this discomfort to the [place] ED on 8/22/24 PM and was discharged early 8/23/24 AM; she reported that she never truly felt better, but she did go to HD on 8/24/24, and then decided to seek medical attention at the [facility] ED thereafter. She does not believe that she had additional fluid removed during the 8/24/24 HD session.   Patient admitted with DKA. Off pathway. Transitioned to SSI. Advanced to clear liquid diet.. SSI started. Resume lantus if tolerating oral diet. Very brittle type 1 diabetic. Dropped glucose on insulin drip to 50-60s. Infusion shut off and transferred to med surg floor.  In the med surg her  sugars and other labs were well controlled.  Just feels mild low back pain but other than that feels back to her baseline.  Able to be discharged home and per pharmacy check it appears she has not had any mealtime insulin feel even though she says she takes it every day in addition to her Lantus.  I will discharge her home with a refill for her mealtime insulin as her sugars are well controlled here on her normal outpatient regimen, she just needs to take it consistently and he consistent meals.           Goals of Care Treatment Preferences:  Code Status: Full Code    Health care agent: Step-Mother Tanya Howard / Father Garcia Howard  Health care agent number: [phone] / [phone]                   SDOH Screening:  The patient was screened for food insecurity, housing instability, transportation needs, utility difficulties, and interpersonal safety. The social determinant(s) of health identified as a concern this admission are:  Housing instability  Food insecurity        Social Determinants of Health with Concerns     Food Insecurity: Food Insecurity Present (8/25/2024)   Housing Stability: High Risk (8/25/2024)        Consults:   Consults (From admission, onward)          Status Ordering Provider     Inpatient consult to Nephrology  Once        Provider:  Mando Benitez MD    Completed KAMRAN GALLARDO            Renal/  ESRD (end stage renal disease)  TThSat HD schedule; last dialyzed reportedly on 8/24/24. Continue with normal dialysis schedule.    Oncology  Anemia in ESRD (end-stage renal disease)  Hgb stable, but likely demonstrating hemoconcentration.    Endocrine  * Type 1 diabetes mellitus with ketoacidosis without coma  Evidenced by gluc 300s, anion gap 28, beta-hydroxy 3.7, suspect serum bicarb is abnormally elevated for situation given contraction alkalosis, as patient was dialyzed today but likely hypovolemic from her ongoing GI sx.  Now resolved and stable back on her home insulin  regimen.    GI  Gastroparesis - suspected, unconfirmed  Acute exacerbation of chronic sx may have put her into starvation ketosis and subsequent DKA. Tx aggressively.  Now resolved.      Other  Vitreous hemorrhage, both eyes  Cont home gtt.        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    Specialty: Family Medicine  Why: @ 11:00 am  Contact information:  [address] [facility] [address]  [phone]               ODETTE JOINER [id number] Follow up.    Why: pt to resume regular schedule .  Contact information:  [address]  [facility] [id number] [phone]                         Patient Instructions:      Ambulatory referral/consult to Pain Clinic   Standing Status: Future   Referral Priority: Routine Referral Type: Consultation   Referral Reason: Specialty Services Required   Requested Specialty: Pain Medicine   Number of Visits Requested: 1     Ambulatory referral/consult to Outpatient Case Management   Referral Priority: Routine Referral Type: Consultation   Referral Reason: Specialty Services Required   Number of Visits Requested: 1     Notify your health care provider if you experience any of the following:  temperature >100.4     Notify your health care provider if you experience any of the following:  persistent nausea and vomiting or diarrhea  "    Notify your health care provider if you experience any of the following:  severe uncontrolled pain     Notify your health care provider if you experience any of the following:  persistent dizziness, light-headedness, or visual disturbances     Activity as tolerated       Significant Diagnostic Studies: Labs: BMP:   Recent Labs   Lab 08/25/24  0849 08/25/24  1756 08/26/24  0801   GLU 80 108 58*    142 141   K 3.0* 3.4* 5.2*   CL 99 100 100   CO2 32* 31* 32*   BUN 26* 29* 32*   CREATININE 4.0* 4.7* 6.0*   CALCIUM 8.8 8.8 8.5*   MG 2.0  --  2.0   , CMP   Recent Labs   Lab 08/24/24 2131 08/25/24  0849 08/25/24  1756 08/26/24  0801    142 142 141   K 3.3* 3.0* 3.4* 5.2*   CL 88* 99 100 100   CO2 21* 32* 31* 32*   * 80 108 58*   BUN 19 26* 29* 32*   CREATININE 3.0* 4.0* 4.7* 6.0*   CALCIUM 9.9 8.8 8.8 8.5*   PROT 8.7* 5.9*  --   --    ALBUMIN 4.9 3.5  --   --    BILITOT 2.6* 1.4*  --   --    ALKPHOS 129 93  --   --    AST 21 13  --   --    ALT 36 20  --   --    ANIONGAP 28* 11 11 9   , CBC   Recent Labs   Lab 08/24/24 2131 08/25/24  0849 08/26/24  0801   WBC 12.40 6.93 8.08   HGB 12.5 10.4* 11.0*   HCT 36.8* 31.2* 33.1*    139* 135*   , INR   Lab Results   Component Value Date    INR 1.0 07/16/2024    INR 1.3 (H) 12/13/2023    INR 0.9 10/03/2023   , Lipid Panel No results found for: "CHOL", "HDL", "LDLCALC", "TRIG", "CHOLHDL", Troponin No results for input(s): "TROPONINI" in the last 168 hours., and A1C:   Recent Labs   Lab 06/23/24  0712   HGBA1C 8.5*     Radiology:   Imaging Results    None           Pending Diagnostic Studies:       None           Medications:  Reconciled Home Medications:      Medication List        START taking these medications      insulin aspart U-100 100 unit/mL (3 mL) Inpn pen  Commonly known as: NovoLOG  Inject 8 Units into the skin 3 (three) times daily with meals.            CHANGE how you take these medications      LANTUS SOLOSTAR U-100 INSULIN 100 unit/mL (3 " "mL) Inpn pen  Generic drug: insulin glargine U-100 (Lantus)  Inject 22 units every day by subcutaneous route in the evening for 30 days, for diabetes.  What changed: Another medication with the same name was removed. Continue taking this medication, and follow the directions you see here.            CONTINUE taking these medications      acetaZOLAMIDE 250 MG tablet  Commonly known as: DIAMOX  take 1 tablet by mouth 3 times a day     ALPHAGAN P 0.15 % ophthalmic solution  Generic drug: brimonidine 0.15 % OPTH DROP  Place 1 drop into both eyes 3 (three) times daily.     apixaban 5 mg Tab  Commonly known as: ELIQUIS  Take 1 tablet (5 mg total) by mouth 2 (two) times daily. Patient w/ thrombocytopenia <50, so held during admission, follow-up with hematologist about restarting     atropine 1% 1 % Drop  Commonly known as: ISOPTO ATROPINE  Place 1 drop into the left eye 2 (two) times a day.     BD ULTRA-FINE MINI PEN NEEDLE 31 gauge x 3/16" Ndle  Generic drug: pen needle, diabetic  Use as directed with insulin once daily     brinzolamide 1 % ophthalmic suspension  Commonly known as: AZOPT  Place1 drop in left eye 3 times a day for 30 days     busPIRone 10 MG tablet  Commonly known as: BUSPAR  Take 1 tablet (10 mg total) by mouth 3 (three) times daily as needed (anxiety).     cetirizine 10 MG tablet  Commonly known as: ZYRTEC  Take 1 tablet every day by oral route.     DEXCOM G7  Misc  Generic drug: blood-glucose meter,continuous  USE WITH SENSORS     * DEXCOM G7 SENSOR Heather  Generic drug: blood-glucose sensor  CHANGE EVERY 10 DAYS     * DEXCOM G7 SENSOR Heather  Generic drug: blood-glucose sensor  Use as directed for CGM. Change sensor every 10 days     dorzolamide-timolol 2-0.5% 22.3-6.8 mg/mL ophthalmic solution  Commonly known as: COSOPT  place 1 drop in left eye twice a day  for 30 days     FLUoxetine 40 MG capsule  Take 1 capsule every day by oral route.     fluticasone propionate 50 mcg/actuation nasal " spray  Commonly known as: FLONASE ALLERGY RELIEF  Spray 1 spray every day by intranasal route.     gabapentin 300 MG capsule  Commonly known as: NEURONTIN  Take 2 capsules twice a day by oral route for 90 days.     hydrALAZINE 25 MG tablet  Commonly known as: APRESOLINE  take 1 tablet by mouth every 8 hours     * lancets 33 gauge Misc  Use to test twice daily     * TRUEPLUS LANCETS 33 gauge Misc  Generic drug: lancets  Use 1 to check blood glucose three times daily     levETIRAcetam 500 MG Tab  Commonly known as: KEPPRA  Take 1 tablet (500 mg total) by mouth 2 (two) times daily.     LORazepam 0.5 MG tablet  Commonly known as: ATIVAN  Take 1 tablet 3 times a day by oral route for 7 days, for severe panic.     ondansetron 4 MG Tbdl  Commonly known as: ZOFRAN-ODT  Place 1 tablet (4 mg) on or under the tongue every 8 hours for 10 days.     oxyCODONE-acetaminophen  mg per tablet  Commonly known as: PERCOCET  Take 1 tablet by mouth every 4 (four) hours as needed for Pain.     pantoprazole 40 MG tablet  Commonly known as: PROTONIX  Take 1 tablet (40 mg total) by mouth once daily.     prednisoLONE acetate 1 % Drps  Commonly known as: PRED FORTE  Place 1 drop into the left eye 2 (two) times daily.     promethazine 25 MG tablet  Commonly known as: PHENERGAN  Take 1 tablet (25 mg total) by mouth every 6 (six) hours as needed.     RENVELA 800 mg Tab  Generic drug: sevelamer carbonate  Take 1 tablet (800 mg total) by mouth 3 (three) times daily with meals.     sucralfate 1 gram tablet  Commonly known as: CARAFATE  Take 1 tablet (1 g total) by mouth 4 (four) times daily.     timolol maleate 0.5% 0.5 % Drop  Commonly known as: TIMOPTIC  Place 1 drop in left eye 2 times a day for 30 days     TRUE METRIX GLUCOSE METER Misc  Generic drug: blood-glucose meter  Use to check blood glucose     TRUE METRIX GLUCOSE TEST STRIP Strp  Generic drug: blood sugar diagnostic  Use 1 strip to check blood glucose three times daily     VENTOLIN  HFA 90 mcg/actuation inhaler  Generic drug: albuterol  Inhale 2 puffs every 4 hours by inhalation route.           * This list has 4 medication(s) that are the same as other medications prescribed for you. Read the directions carefully, and ask your doctor or other care provider to review them with you.                STOP taking these medications      predniSONE 20 MG tablet  Commonly known as: DELTASONE              Indwelling Lines/Drains at time of discharge:   Lines/Drains/Airways       Drain  Duration                  Hemodialysis AV Fistula Left upper arm -- days                    Time spent on the discharge of patient: 32 minutes         Madai Butler DO  Department of Hospital Medicine  [facility]

## 2024-08-26 NOTE — ASSESSMENT & PLAN NOTE
Evidenced by gluc 300s, anion gap 28, beta-hydroxy 3.7, suspect serum bicarb is abnormally elevated for situation given contraction alkalosis, as patient was dialyzed today but likely hypovolemic from her ongoing GI sx.  Now resolved and stable back on her home insulin regimen.

## 2024-08-26 NOTE — ASSESSMENT & PLAN NOTE
Acute exacerbation of chronic sx may have put her into starvation ketosis and subsequent DKA. Tx aggressively.  Now resolved.

## 2024-08-26 NOTE — NURSING
Patient cleared for discharge. Patient IV's d/c and telemetry box sent to [facility]. Discharge instructions were explained to patient and patient verbalized understanding and was given opportunity to ask questions.

## 2024-08-26 NOTE — HOSPITAL COURSE
34F p/w 10/10 abdominal pain, located diffusely throughout, with associated vomiting. Patient reports that this level of pain is not typical for her gastroparesis. She reports she is unable to manage this discomfort at home. She presented for this discomfort to the [place] ED on 8/22/24 PM and was discharged early 8/23/24 AM; she reported that she never truly felt better, but she did go to HD on 8/24/24, and then decided to seek medical attention at the [facility] ED thereafter. She does not believe that she had additional fluid removed during the 8/24/24 HD session.   Patient admitted with DKA. Off pathway. Transitioned to SSI. Advanced to clear liquid diet.. SSI started. Resume lantus if tolerating oral diet. Very brittle type 1 diabetic. Dropped glucose on insulin drip to 50-60s. Infusion shut off and transferred to med surg floor.  In the med surg her sugars and other labs were well controlled.  Just feels mild low back pain but other than that feels back to her baseline.  Able to be discharged home and per pharmacy check it appears she has not had any mealtime insulin feel even though she says she takes it every day in addition to her Lantus.  I will discharge her home with a refill for her mealtime insulin as her sugars are well controlled here on her normal outpatient regimen, she just needs to take it consistently and he consistent meals.

## 2024-08-28 ENCOUNTER — HOSPITAL ENCOUNTER (EMERGENCY)
Facility: HOSPITAL | Age: 34
Discharge: HOME | End: 2024-08-28
Attending: EMERGENCY MEDICINE
Payer: MEDICAID

## 2024-08-28 ENCOUNTER — HOSPITAL ENCOUNTER (OUTPATIENT)
Facility: HOSPITAL | Age: 34
Discharge: HOME | End: 2024-08-31
Attending: STUDENT IN AN ORGANIZED HEALTH CARE EDUCATION/TRAINING PROGRAM | Admitting: INTERNAL MEDICINE
Payer: MEDICAID

## 2024-08-28 VITALS
DIASTOLIC BLOOD PRESSURE: 69 MMHG | HEIGHT: 62 IN | RESPIRATION RATE: 21 BRPM | WEIGHT: 117 LBS | OXYGEN SATURATION: 99 % | TEMPERATURE: 98 F | BODY MASS INDEX: 21.53 KG/M2 | HEART RATE: 97 BPM | SYSTOLIC BLOOD PRESSURE: 167 MMHG

## 2024-08-28 DIAGNOSIS — N18.6 ESRD NEEDING DIALYSIS: Primary | ICD-10-CM

## 2024-08-28 DIAGNOSIS — G89.29 CHRONIC ABDOMINAL PAIN: ICD-10-CM

## 2024-08-28 DIAGNOSIS — R07.9 CHEST PAIN: ICD-10-CM

## 2024-08-28 DIAGNOSIS — Z99.2 ESRD (END STAGE RENAL DISEASE) ON DIALYSIS: ICD-10-CM

## 2024-08-28 DIAGNOSIS — R10.9 CHRONIC ABDOMINAL PAIN: ICD-10-CM

## 2024-08-28 DIAGNOSIS — E11.43 DIABETIC GASTROPARESIS: ICD-10-CM

## 2024-08-28 DIAGNOSIS — E11.10 DKA (DIABETIC KETOACIDOSIS): ICD-10-CM

## 2024-08-28 DIAGNOSIS — I16.1 HYPERTENSIVE EMERGENCY: ICD-10-CM

## 2024-08-28 DIAGNOSIS — K31.84 DIABETIC GASTROPARESIS: ICD-10-CM

## 2024-08-28 DIAGNOSIS — I10 UNCONTROLLED HYPERTENSION: Primary | ICD-10-CM

## 2024-08-28 DIAGNOSIS — N18.6 ESRD (END STAGE RENAL DISEASE) ON DIALYSIS: ICD-10-CM

## 2024-08-28 DIAGNOSIS — E11.65 SEVERE HYPERGLYCEMIA DUE TO DIABETES MELLITUS: ICD-10-CM

## 2024-08-28 DIAGNOSIS — N18.6 ESRD (END STAGE RENAL DISEASE): ICD-10-CM

## 2024-08-28 DIAGNOSIS — Z99.2 ESRD NEEDING DIALYSIS: Primary | ICD-10-CM

## 2024-08-28 DIAGNOSIS — R10.9 ABDOMINAL PAIN: ICD-10-CM

## 2024-08-28 DIAGNOSIS — E11.65 UNCONTROLLED TYPE 2 DIABETES MELLITUS WITH HYPERGLYCEMIA: ICD-10-CM

## 2024-08-28 LAB
ALBUMIN SERPL BCP-MCNC: 3.3 G/DL (ref 3.5–5.2)
ALBUMIN SERPL BCP-MCNC: 4.2 G/DL (ref 3.5–5.2)
ALLENS TEST: ABNORMAL
ALP SERPL-CCNC: 90 U/L (ref 55–135)
ALP SERPL-CCNC: 99 U/L (ref 55–135)
ALT SERPL W/O P-5'-P-CCNC: 16 U/L (ref 10–44)
ALT SERPL W/O P-5'-P-CCNC: 17 U/L (ref 10–44)
ANION GAP SERPL CALC-SCNC: 17 MMOL/L (ref 8–16)
ANION GAP SERPL CALC-SCNC: 20 MMOL/L (ref 8–16)
ANION GAP SERPL CALC-SCNC: 22 MMOL/L (ref 8–16)
AST SERPL-CCNC: 12 U/L (ref 10–40)
AST SERPL-CCNC: 24 U/L (ref 10–40)
B-OH-BUTYR BLD STRIP-SCNC: 1.6 MMOL/L (ref 0–0.5)
BASOPHILS # BLD AUTO: 0.01 K/UL (ref 0–0.2)
BASOPHILS # BLD AUTO: 0.01 K/UL (ref 0–0.2)
BASOPHILS NFR BLD: 0.1 % (ref 0–1.9)
BASOPHILS NFR BLD: 0.1 % (ref 0–1.9)
BILIRUB SERPL-MCNC: 1.1 MG/DL (ref 0.1–1)
BILIRUB SERPL-MCNC: 1.2 MG/DL (ref 0.1–1)
BNP SERPL-MCNC: 4098 PG/ML (ref 0–99)
BUN SERPL-MCNC: 54 MG/DL (ref 6–20)
BUN SERPL-MCNC: 56 MG/DL (ref 6–20)
BUN SERPL-MCNC: 57 MG/DL (ref 6–20)
CALCIUM SERPL-MCNC: 9.2 MG/DL (ref 8.7–10.5)
CALCIUM SERPL-MCNC: 9.5 MG/DL (ref 8.7–10.5)
CALCIUM SERPL-MCNC: 9.6 MG/DL (ref 8.7–10.5)
CHLORIDE SERPL-SCNC: 101 MMOL/L (ref 95–110)
CHLORIDE SERPL-SCNC: 95 MMOL/L (ref 95–110)
CHLORIDE SERPL-SCNC: 97 MMOL/L (ref 95–110)
CO2 SERPL-SCNC: 19 MMOL/L (ref 23–29)
CO2 SERPL-SCNC: 20 MMOL/L (ref 23–29)
CO2 SERPL-SCNC: 21 MMOL/L (ref 23–29)
CREAT SERPL-MCNC: 8.7 MG/DL (ref 0.5–1.4)
CREAT SERPL-MCNC: 9.5 MG/DL (ref 0.5–1.4)
CREAT SERPL-MCNC: 9.8 MG/DL (ref 0.5–1.4)
DELSYS: ABNORMAL
DIFFERENTIAL METHOD BLD: ABNORMAL
DIFFERENTIAL METHOD BLD: ABNORMAL
EOSINOPHIL # BLD AUTO: 0.2 K/UL (ref 0–0.5)
EOSINOPHIL # BLD AUTO: 0.2 K/UL (ref 0–0.5)
EOSINOPHIL NFR BLD: 2.1 % (ref 0–8)
EOSINOPHIL NFR BLD: 2.2 % (ref 0–8)
ERYTHROCYTE [DISTWIDTH] IN BLOOD BY AUTOMATED COUNT: 17.2 % (ref 11.5–14.5)
ERYTHROCYTE [DISTWIDTH] IN BLOOD BY AUTOMATED COUNT: 17.4 % (ref 11.5–14.5)
EST. GFR  (NO RACE VARIABLE): 4.9 ML/MIN/1.73 M^2
EST. GFR  (NO RACE VARIABLE): 5.1 ML/MIN/1.73 M^2
EST. GFR  (NO RACE VARIABLE): 5.6 ML/MIN/1.73 M^2
FLOW: 2
GLUCOSE SERPL-MCNC: 352 MG/DL (ref 70–110)
GLUCOSE SERPL-MCNC: 378 MG/DL (ref 70–110)
GLUCOSE SERPL-MCNC: 385 MG/DL (ref 70–110)
GLUCOSE SERPL-MCNC: 396 MG/DL (ref 70–110)
GLUCOSE SERPL-MCNC: 407 MG/DL (ref 70–110)
GLUCOSE SERPL-MCNC: 498 MG/DL (ref 70–110)
HCG INTACT+B SERPL-ACNC: 1.85 MIU/ML
HCO3 UR-SCNC: 24 MMOL/L (ref 24–28)
HCT VFR BLD AUTO: 26.9 % (ref 37–48.5)
HCT VFR BLD AUTO: 28.9 % (ref 37–48.5)
HCT VFR BLD CALC: 53 %PCV (ref 36–54)
HGB BLD-MCNC: 9.1 G/DL (ref 12–16)
HGB BLD-MCNC: 9.8 G/DL (ref 12–16)
IMM GRANULOCYTES # BLD AUTO: 0.03 K/UL (ref 0–0.04)
IMM GRANULOCYTES # BLD AUTO: 0.04 K/UL (ref 0–0.04)
IMM GRANULOCYTES NFR BLD AUTO: 0.4 % (ref 0–0.5)
IMM GRANULOCYTES NFR BLD AUTO: 0.5 % (ref 0–0.5)
LACTATE SERPL-SCNC: 0.6 MMOL/L (ref 0.5–1.9)
LIPASE SERPL-CCNC: 17 U/L (ref 4–60)
LIPASE SERPL-CCNC: 27 U/L (ref 4–60)
LYMPHOCYTES # BLD AUTO: 0.5 K/UL (ref 1–4.8)
LYMPHOCYTES # BLD AUTO: 0.6 K/UL (ref 1–4.8)
LYMPHOCYTES NFR BLD: 5.4 % (ref 18–48)
LYMPHOCYTES NFR BLD: 7.3 % (ref 18–48)
MAGNESIUM SERPL-MCNC: 2.4 MG/DL (ref 1.6–2.6)
MCH RBC QN AUTO: 29.2 PG (ref 27–31)
MCH RBC QN AUTO: 30.2 PG (ref 27–31)
MCHC RBC AUTO-ENTMCNC: 33.8 G/DL (ref 32–36)
MCHC RBC AUTO-ENTMCNC: 33.9 G/DL (ref 32–36)
MCV RBC AUTO: 86 FL (ref 82–98)
MCV RBC AUTO: 89 FL (ref 82–98)
MODE: ABNORMAL
MONOCYTES # BLD AUTO: 0.7 K/UL (ref 0.3–1)
MONOCYTES # BLD AUTO: 0.9 K/UL (ref 0.3–1)
MONOCYTES NFR BLD: 8.8 % (ref 4–15)
MONOCYTES NFR BLD: 9.8 % (ref 4–15)
NEUTROPHILS # BLD AUTO: 6.6 K/UL (ref 1.8–7.7)
NEUTROPHILS # BLD AUTO: 7.2 K/UL (ref 1.8–7.7)
NEUTROPHILS NFR BLD: 81.2 % (ref 38–73)
NEUTROPHILS NFR BLD: 82.1 % (ref 38–73)
NRBC BLD-RTO: 0 /100 WBC
NRBC BLD-RTO: 0 /100 WBC
OHS QRS DURATION: 84 MS
OHS QTC CALCULATION: 484 MS
PCO2 BLDA: 50.6 MMHG (ref 35–45)
PH SMN: 7.28 [PH] (ref 7.35–7.45)
PLATELET # BLD AUTO: 114 K/UL (ref 150–450)
PLATELET # BLD AUTO: 119 K/UL (ref 150–450)
PMV BLD AUTO: 10.3 FL (ref 9.2–12.9)
PMV BLD AUTO: 10.7 FL (ref 9.2–12.9)
PO2 BLDA: 44 MMHG (ref 40–60)
POC BE: -3 MMOL/L
POC IONIZED CALCIUM: 1.1 MMOL/L (ref 1.06–1.42)
POC SATURATED O2: 73 % (ref 95–100)
POC TCO2: 25 MMOL/L (ref 24–29)
POCT GLUCOSE: 492 MG/DL (ref 70–110)
POCT GLUCOSE: >500 MG/DL (ref 70–110)
POTASSIUM BLD-SCNC: 4.9 MMOL/L (ref 3.5–5.1)
POTASSIUM SERPL-SCNC: 4.3 MMOL/L (ref 3.5–5.1)
POTASSIUM SERPL-SCNC: 4.4 MMOL/L (ref 3.5–5.1)
POTASSIUM SERPL-SCNC: 5.1 MMOL/L (ref 3.5–5.1)
PROT SERPL-MCNC: 6.8 G/DL (ref 6–8.4)
PROT SERPL-MCNC: 7.6 G/DL (ref 6–8.4)
RBC # BLD AUTO: 3.12 M/UL (ref 4–5.4)
RBC # BLD AUTO: 3.25 M/UL (ref 4–5.4)
SAMPLE: ABNORMAL
SITE: ABNORMAL
SODIUM BLD-SCNC: 136 MMOL/L (ref 136–145)
SODIUM SERPL-SCNC: 135 MMOL/L (ref 136–145)
SODIUM SERPL-SCNC: 138 MMOL/L (ref 136–145)
SODIUM SERPL-SCNC: 139 MMOL/L (ref 136–145)
TROPONIN I SERPL HS-MCNC: 117.5 PG/ML (ref 0–14.9)
WBC # BLD AUTO: 8.08 K/UL (ref 3.9–12.7)
WBC # BLD AUTO: 8.74 K/UL (ref 3.9–12.7)

## 2024-08-28 PROCEDURE — 83735 ASSAY OF MAGNESIUM: CPT | Performed by: INTERNAL MEDICINE

## 2024-08-28 PROCEDURE — 82962 GLUCOSE BLOOD TEST: CPT

## 2024-08-28 PROCEDURE — 99900035 HC TECH TIME PER 15 MIN (STAT)

## 2024-08-28 PROCEDURE — 63600175 PHARM REV CODE 636 W HCPCS: Performed by: EMERGENCY MEDICINE

## 2024-08-28 PROCEDURE — 84484 ASSAY OF TROPONIN QUANT: CPT | Performed by: STUDENT IN AN ORGANIZED HEALTH CARE EDUCATION/TRAINING PROGRAM

## 2024-08-28 PROCEDURE — 27000221 HC OXYGEN, UP TO 24 HOURS

## 2024-08-28 PROCEDURE — 84702 CHORIONIC GONADOTROPIN TEST: CPT | Performed by: STUDENT IN AN ORGANIZED HEALTH CARE EDUCATION/TRAINING PROGRAM

## 2024-08-28 PROCEDURE — 82010 KETONE BODYS QUAN: CPT | Performed by: STUDENT IN AN ORGANIZED HEALTH CARE EDUCATION/TRAINING PROGRAM

## 2024-08-28 PROCEDURE — 25000003 PHARM REV CODE 250: Performed by: STUDENT IN AN ORGANIZED HEALTH CARE EDUCATION/TRAINING PROGRAM

## 2024-08-28 PROCEDURE — 96366 THER/PROPH/DIAG IV INF ADDON: CPT

## 2024-08-28 PROCEDURE — 85014 HEMATOCRIT: CPT

## 2024-08-28 PROCEDURE — 36415 COLL VENOUS BLD VENIPUNCTURE: CPT | Performed by: STUDENT IN AN ORGANIZED HEALTH CARE EDUCATION/TRAINING PROGRAM

## 2024-08-28 PROCEDURE — 96376 TX/PRO/DX INJ SAME DRUG ADON: CPT

## 2024-08-28 PROCEDURE — 99900031 HC PATIENT EDUCATION (STAT)

## 2024-08-28 PROCEDURE — 83690 ASSAY OF LIPASE: CPT | Performed by: EMERGENCY MEDICINE

## 2024-08-28 PROCEDURE — 84703 CHORIONIC GONADOTROPIN ASSAY: CPT | Performed by: INTERNAL MEDICINE

## 2024-08-28 PROCEDURE — 96365 THER/PROPH/DIAG IV INF INIT: CPT

## 2024-08-28 PROCEDURE — 85025 COMPLETE CBC W/AUTO DIFF WBC: CPT | Mod: 59 | Performed by: STUDENT IN AN ORGANIZED HEALTH CARE EDUCATION/TRAINING PROGRAM

## 2024-08-28 PROCEDURE — G0378 HOSPITAL OBSERVATION PER HR: HCPCS

## 2024-08-28 PROCEDURE — 96375 TX/PRO/DX INJ NEW DRUG ADDON: CPT

## 2024-08-28 PROCEDURE — 82330 ASSAY OF CALCIUM: CPT

## 2024-08-28 PROCEDURE — 80048 BASIC METABOLIC PNL TOTAL CA: CPT | Mod: XB | Performed by: EMERGENCY MEDICINE

## 2024-08-28 PROCEDURE — 93010 ELECTROCARDIOGRAM REPORT: CPT | Mod: ,,, | Performed by: INTERNAL MEDICINE

## 2024-08-28 PROCEDURE — 85025 COMPLETE CBC W/AUTO DIFF WBC: CPT | Performed by: EMERGENCY MEDICINE

## 2024-08-28 PROCEDURE — 93005 ELECTROCARDIOGRAM TRACING: CPT | Performed by: INTERNAL MEDICINE

## 2024-08-28 PROCEDURE — 83605 ASSAY OF LACTIC ACID: CPT | Performed by: STUDENT IN AN ORGANIZED HEALTH CARE EDUCATION/TRAINING PROGRAM

## 2024-08-28 PROCEDURE — 96361 HYDRATE IV INFUSION ADD-ON: CPT

## 2024-08-28 PROCEDURE — 80053 COMPREHEN METABOLIC PANEL: CPT | Performed by: EMERGENCY MEDICINE

## 2024-08-28 PROCEDURE — 96374 THER/PROPH/DIAG INJ IV PUSH: CPT

## 2024-08-28 PROCEDURE — 82803 BLOOD GASES ANY COMBINATION: CPT

## 2024-08-28 PROCEDURE — 84132 ASSAY OF SERUM POTASSIUM: CPT

## 2024-08-28 PROCEDURE — 94761 N-INVAS EAR/PLS OXIMETRY MLT: CPT

## 2024-08-28 PROCEDURE — 80053 COMPREHEN METABOLIC PANEL: CPT | Mod: 91 | Performed by: STUDENT IN AN ORGANIZED HEALTH CARE EDUCATION/TRAINING PROGRAM

## 2024-08-28 PROCEDURE — 94761 N-INVAS EAR/PLS OXIMETRY MLT: CPT | Mod: XB

## 2024-08-28 PROCEDURE — 83690 ASSAY OF LIPASE: CPT | Mod: 91 | Performed by: STUDENT IN AN ORGANIZED HEALTH CARE EDUCATION/TRAINING PROGRAM

## 2024-08-28 PROCEDURE — 36415 COLL VENOUS BLD VENIPUNCTURE: CPT | Performed by: EMERGENCY MEDICINE

## 2024-08-28 PROCEDURE — 99284 EMERGENCY DEPT VISIT MOD MDM: CPT | Mod: 25

## 2024-08-28 PROCEDURE — 63600175 PHARM REV CODE 636 W HCPCS: Performed by: STUDENT IN AN ORGANIZED HEALTH CARE EDUCATION/TRAINING PROGRAM

## 2024-08-28 PROCEDURE — 80048 BASIC METABOLIC PNL TOTAL CA: CPT | Mod: 91 | Performed by: INTERNAL MEDICINE

## 2024-08-28 PROCEDURE — 25000003 PHARM REV CODE 250: Performed by: INTERNAL MEDICINE

## 2024-08-28 PROCEDURE — 93005 ELECTROCARDIOGRAM TRACING: CPT

## 2024-08-28 PROCEDURE — 99291 CRITICAL CARE FIRST HOUR: CPT

## 2024-08-28 PROCEDURE — 83880 ASSAY OF NATRIURETIC PEPTIDE: CPT | Performed by: STUDENT IN AN ORGANIZED HEALTH CARE EDUCATION/TRAINING PROGRAM

## 2024-08-28 PROCEDURE — 63600175 PHARM REV CODE 636 W HCPCS: Performed by: INTERNAL MEDICINE

## 2024-08-28 PROCEDURE — 96375 TX/PRO/DX INJ NEW DRUG ADDON: CPT | Mod: 59

## 2024-08-28 PROCEDURE — 84295 ASSAY OF SERUM SODIUM: CPT

## 2024-08-28 RX ORDER — SODIUM CHLORIDE 9 MG/ML
125 INJECTION, SOLUTION INTRAVENOUS CONTINUOUS
Status: DISCONTINUED | OUTPATIENT
Start: 2024-08-28 | End: 2024-08-29

## 2024-08-28 RX ORDER — IBUPROFEN 200 MG
24 TABLET ORAL
Status: DISCONTINUED | OUTPATIENT
Start: 2024-08-28 | End: 2024-08-29

## 2024-08-28 RX ORDER — HYDRALAZINE HYDROCHLORIDE 25 MG/1
25 TABLET, FILM COATED ORAL ONCE
Status: COMPLETED | OUTPATIENT
Start: 2024-08-28 | End: 2024-08-28

## 2024-08-28 RX ORDER — DROPERIDOL 2.5 MG/ML
1.25 INJECTION, SOLUTION INTRAMUSCULAR; INTRAVENOUS ONCE
Status: DISCONTINUED | OUTPATIENT
Start: 2024-08-28 | End: 2024-08-28

## 2024-08-28 RX ORDER — ONDANSETRON HYDROCHLORIDE 2 MG/ML
4 INJECTION, SOLUTION INTRAVENOUS EVERY 6 HOURS PRN
Status: DISCONTINUED | OUTPATIENT
Start: 2024-08-28 | End: 2024-08-31 | Stop reason: HOSPADM

## 2024-08-28 RX ORDER — ACETAMINOPHEN 325 MG/1
650 TABLET ORAL EVERY 8 HOURS PRN
Status: DISCONTINUED | OUTPATIENT
Start: 2024-08-28 | End: 2024-08-28

## 2024-08-28 RX ORDER — LANOLIN ALCOHOL/MO/W.PET/CERES
800 CREAM (GRAM) TOPICAL
Status: DISCONTINUED | OUTPATIENT
Start: 2024-08-28 | End: 2024-08-31 | Stop reason: HOSPADM

## 2024-08-28 RX ORDER — NALOXONE HCL 0.4 MG/ML
0.02 VIAL (ML) INJECTION
Status: DISCONTINUED | OUTPATIENT
Start: 2024-08-28 | End: 2024-08-31 | Stop reason: HOSPADM

## 2024-08-28 RX ORDER — DEXTROSE MONOHYDRATE AND SODIUM CHLORIDE 5; .45 G/100ML; G/100ML
125 INJECTION, SOLUTION INTRAVENOUS CONTINUOUS PRN
Status: DISCONTINUED | OUTPATIENT
Start: 2024-08-28 | End: 2024-08-31 | Stop reason: HOSPADM

## 2024-08-28 RX ORDER — ACETAMINOPHEN 325 MG/1
650 TABLET ORAL EVERY 4 HOURS PRN
Status: DISCONTINUED | OUTPATIENT
Start: 2024-08-28 | End: 2024-08-31 | Stop reason: HOSPADM

## 2024-08-28 RX ORDER — INSULIN GLARGINE 100 [IU]/ML
22 INJECTION, SOLUTION SUBCUTANEOUS ONCE
Status: DISCONTINUED | OUTPATIENT
Start: 2024-08-28 | End: 2024-08-28

## 2024-08-28 RX ORDER — DEXTROSE MONOHYDRATE 100 MG/ML
INJECTION, SOLUTION INTRAVENOUS
Status: DISCONTINUED | OUTPATIENT
Start: 2024-08-28 | End: 2024-08-29

## 2024-08-28 RX ORDER — ONDANSETRON HYDROCHLORIDE 2 MG/ML
4 INJECTION, SOLUTION INTRAVENOUS
Status: COMPLETED | OUTPATIENT
Start: 2024-08-28 | End: 2024-08-28

## 2024-08-28 RX ORDER — FAMOTIDINE 10 MG/ML
20 INJECTION INTRAVENOUS DAILY
Status: DISCONTINUED | OUTPATIENT
Start: 2024-08-28 | End: 2024-08-31 | Stop reason: HOSPADM

## 2024-08-28 RX ORDER — HYDROMORPHONE HYDROCHLORIDE 1 MG/ML
1 INJECTION, SOLUTION INTRAMUSCULAR; INTRAVENOUS; SUBCUTANEOUS
Status: COMPLETED | OUTPATIENT
Start: 2024-08-28 | End: 2024-08-28

## 2024-08-28 RX ORDER — AMLODIPINE BESYLATE 5 MG/1
10 TABLET ORAL
Status: COMPLETED | OUTPATIENT
Start: 2024-08-28 | End: 2024-08-28

## 2024-08-28 RX ORDER — METOCLOPRAMIDE HYDROCHLORIDE 5 MG/ML
5 INJECTION INTRAMUSCULAR; INTRAVENOUS EVERY 6 HOURS
Status: DISCONTINUED | OUTPATIENT
Start: 2024-08-28 | End: 2024-08-31 | Stop reason: HOSPADM

## 2024-08-28 RX ORDER — HYDROMORPHONE HYDROCHLORIDE 1 MG/ML
0.5 INJECTION, SOLUTION INTRAMUSCULAR; INTRAVENOUS; SUBCUTANEOUS
Status: COMPLETED | OUTPATIENT
Start: 2024-08-28 | End: 2024-08-28

## 2024-08-28 RX ORDER — SODIUM,POTASSIUM PHOSPHATES 280-250MG
2 POWDER IN PACKET (EA) ORAL
Status: DISCONTINUED | OUTPATIENT
Start: 2024-08-28 | End: 2024-08-31 | Stop reason: HOSPADM

## 2024-08-28 RX ORDER — NICARDIPINE HYDROCHLORIDE 0.2 MG/ML
0-15 INJECTION INTRAVENOUS CONTINUOUS
Status: DISCONTINUED | OUTPATIENT
Start: 2024-08-28 | End: 2024-08-29

## 2024-08-28 RX ORDER — DROPERIDOL 2.5 MG/ML
0.62 INJECTION, SOLUTION INTRAMUSCULAR; INTRAVENOUS ONCE
Status: DISCONTINUED | OUTPATIENT
Start: 2024-08-28 | End: 2024-08-28

## 2024-08-28 RX ORDER — IBUPROFEN 200 MG
16 TABLET ORAL
Status: DISCONTINUED | OUTPATIENT
Start: 2024-08-28 | End: 2024-08-29

## 2024-08-28 RX ORDER — ALUMINUM HYDROXIDE, MAGNESIUM HYDROXIDE, AND SIMETHICONE 1200; 120; 1200 MG/30ML; MG/30ML; MG/30ML
30 SUSPENSION ORAL 4 TIMES DAILY PRN
Status: DISCONTINUED | OUTPATIENT
Start: 2024-08-28 | End: 2024-08-31 | Stop reason: HOSPADM

## 2024-08-28 RX ORDER — SODIUM CHLORIDE 0.9 % (FLUSH) 0.9 %
10 SYRINGE (ML) INJECTION
Status: DISCONTINUED | OUTPATIENT
Start: 2024-08-28 | End: 2024-08-31 | Stop reason: HOSPADM

## 2024-08-28 RX ORDER — GLUCAGON 1 MG
1 KIT INJECTION
Status: DISCONTINUED | OUTPATIENT
Start: 2024-08-28 | End: 2024-08-29

## 2024-08-28 RX ORDER — CARVEDILOL 25 MG/1
25 TABLET ORAL ONCE
Status: COMPLETED | OUTPATIENT
Start: 2024-08-28 | End: 2024-08-28

## 2024-08-28 RX ORDER — DIAZEPAM 10 MG/2ML
5 INJECTION INTRAMUSCULAR ONCE
Status: COMPLETED | OUTPATIENT
Start: 2024-08-28 | End: 2024-08-28

## 2024-08-28 RX ORDER — SODIUM CHLORIDE 0.9 % (FLUSH) 0.9 %
10 SYRINGE (ML) INJECTION EVERY 12 HOURS PRN
Status: DISCONTINUED | OUTPATIENT
Start: 2024-08-28 | End: 2024-08-31 | Stop reason: HOSPADM

## 2024-08-28 RX ORDER — HYDRALAZINE HYDROCHLORIDE 20 MG/ML
10 INJECTION INTRAMUSCULAR; INTRAVENOUS
Status: COMPLETED | OUTPATIENT
Start: 2024-08-28 | End: 2024-08-28

## 2024-08-28 RX ORDER — AMOXICILLIN 250 MG
1 CAPSULE ORAL 2 TIMES DAILY
Status: DISCONTINUED | OUTPATIENT
Start: 2024-08-28 | End: 2024-08-31 | Stop reason: HOSPADM

## 2024-08-28 RX ORDER — HALOPERIDOL 5 MG/ML
2 INJECTION INTRAMUSCULAR
Status: COMPLETED | OUTPATIENT
Start: 2024-08-28 | End: 2024-08-28

## 2024-08-28 RX ORDER — FAMOTIDINE 10 MG/ML
20 INJECTION INTRAVENOUS EVERY 12 HOURS
Status: DISCONTINUED | OUTPATIENT
Start: 2024-08-28 | End: 2024-08-28

## 2024-08-28 RX ORDER — DROPERIDOL 2.5 MG/ML
1.25 INJECTION, SOLUTION INTRAMUSCULAR; INTRAVENOUS ONCE
Status: DISCONTINUED | OUTPATIENT
Start: 2024-08-28 | End: 2024-08-31 | Stop reason: HOSPADM

## 2024-08-28 RX ORDER — MORPHINE SULFATE 10 MG/ML
10 INJECTION INTRAMUSCULAR; INTRAVENOUS; SUBCUTANEOUS ONCE
Status: COMPLETED | OUTPATIENT
Start: 2024-08-28 | End: 2024-08-29

## 2024-08-28 RX ORDER — LABETALOL HYDROCHLORIDE 5 MG/ML
10 INJECTION, SOLUTION INTRAVENOUS
Status: COMPLETED | OUTPATIENT
Start: 2024-08-28 | End: 2024-08-28

## 2024-08-28 RX ORDER — TALC
6 POWDER (GRAM) TOPICAL NIGHTLY PRN
Status: DISCONTINUED | OUTPATIENT
Start: 2024-08-28 | End: 2024-08-31 | Stop reason: HOSPADM

## 2024-08-28 RX ADMIN — FAMOTIDINE 20 MG: 10 INJECTION, SOLUTION INTRAVENOUS at 09:08

## 2024-08-28 RX ADMIN — HYDROMORPHONE HYDROCHLORIDE 0.5 MG: 1 INJECTION, SOLUTION INTRAMUSCULAR; INTRAVENOUS; SUBCUTANEOUS at 09:08

## 2024-08-28 RX ADMIN — DIAZEPAM 5 MG: 5 INJECTION, SOLUTION INTRAMUSCULAR; INTRAVENOUS at 10:08

## 2024-08-28 RX ADMIN — HYDROMORPHONE HYDROCHLORIDE 1 MG: 1 INJECTION, SOLUTION INTRAMUSCULAR; INTRAVENOUS; SUBCUTANEOUS at 07:08

## 2024-08-28 RX ADMIN — ONDANSETRON 4 MG: 2 INJECTION INTRAMUSCULAR; INTRAVENOUS at 04:08

## 2024-08-28 RX ADMIN — CARVEDILOL 25 MG: 25 TABLET, FILM COATED ORAL at 05:08

## 2024-08-28 RX ADMIN — METOCLOPRAMIDE HYDROCHLORIDE 5 MG: 5 INJECTION INTRAMUSCULAR; INTRAVENOUS at 09:08

## 2024-08-28 RX ADMIN — AMLODIPINE BESYLATE 10 MG: 5 TABLET ORAL at 05:08

## 2024-08-28 RX ADMIN — HUMAN INSULIN 6 UNITS: 100 INJECTION, SOLUTION SUBCUTANEOUS at 09:08

## 2024-08-28 RX ADMIN — INSULIN HUMAN 0.1 UNITS/KG/HR: 1 INJECTION, SOLUTION INTRAVENOUS at 09:08

## 2024-08-28 RX ADMIN — SODIUM CHLORIDE 125 ML/HR: 9 INJECTION, SOLUTION INTRAVENOUS at 09:08

## 2024-08-28 RX ADMIN — HYDROMORPHONE HYDROCHLORIDE 1 MG: 1 INJECTION, SOLUTION INTRAMUSCULAR; INTRAVENOUS; SUBCUTANEOUS at 04:08

## 2024-08-28 RX ADMIN — SODIUM CHLORIDE, POTASSIUM CHLORIDE, SODIUM LACTATE AND CALCIUM CHLORIDE 500 ML: 600; 310; 30; 20 INJECTION, SOLUTION INTRAVENOUS at 07:08

## 2024-08-28 RX ADMIN — HYDROMORPHONE HYDROCHLORIDE 1 MG: 1 INJECTION, SOLUTION INTRAMUSCULAR; INTRAVENOUS; SUBCUTANEOUS at 08:08

## 2024-08-28 RX ADMIN — HYDRALAZINE HYDROCHLORIDE 25 MG: 25 TABLET ORAL at 05:08

## 2024-08-28 RX ADMIN — HYDRALAZINE HYDROCHLORIDE 10 MG: 20 INJECTION INTRAMUSCULAR; INTRAVENOUS at 05:08

## 2024-08-28 RX ADMIN — LABETALOL HYDROCHLORIDE 10 MG: 5 INJECTION, SOLUTION INTRAVENOUS at 07:08

## 2024-08-28 RX ADMIN — HALOPERIDOL LACTATE 2 MG: 5 INJECTION, SOLUTION INTRAMUSCULAR at 07:08

## 2024-08-28 RX ADMIN — NICARDIPINE HYDROCHLORIDE 5 MG/HR: 0.2 INJECTION INTRAVENOUS at 09:08

## 2024-08-28 NOTE — ED TRIAGE NOTES
Pt brought in by EMS from home for c/o abd pain. Pt seen frequently in ED for similar complaints. Pt writhing around in bed. States pain started this morning. Also reports she missed dialysis yesterday. She states her parents said they didn't have gas money. Pt hypertensive w/ EMS 200s/120s. Unable to obtain BP upon arrival due to pt's movement. Pt denies SOB, chest pain.

## 2024-08-28 NOTE — ED PROVIDER NOTES
History     Chief Complaint   Patient presents with    Abdominal Pain     HPI:  Tabby Howard is a 34 y.o. female with PMH as below who presents to the [facility] emergency department for evaluation of acute on chronic generalized abdominal pain, worse in the left flank, identical to prior episodes diagnosed as gastroparesis. Patient has ESRD and is on HD TThSa but missed last session due to transportation difficulties.       PCP: Melony Kim NP    Review of patient's allergies indicates:   Allergen Reactions    Droperidol Hives    Haldol [haloperidol lactate] Hives    Penicillins Hives      Past Medical History:   Diagnosis Date    ESRD on hemodialysis 2022    Gastritis 2022    EGD was 22    Gastroparesis 2022    has not had Emptying study    Heart failure with preserved ejection fraction 2022    EF 55% on 3/22    History of supraventricular tachycardia     Hyperkalemia 2022    Hypertensive emergency 2022    Sickle cell trait 2022    Type 2 diabetes mellitus      Past Surgical History:   Procedure Laterality Date     SECTION      x 3    COLONOSCOPY      COLONOSCOPY N/A 2022    Procedure: COLONOSCOPY;  Surgeon: Jagdeep Cedeno MD;  Location: [facility];  Service: Endoscopy;  Laterality: N/A;    DESTRUCTION, CILIARY BODY, USING LASER Left 2024    Procedure: Cyclophotocoagulation G-probe, retrobulbar chlorpromazine left eye;  Surgeon: Fidel Wise MD;  Location: [address];  Service: Ophthalmology;  Laterality: Left;    ENDOSCOPIC ULTRASOUND OF UPPER GASTROINTESTINAL TRACT N/A 2023    Procedure: ULTRASOUND, UPPER GI TRACT, ENDOSCOPIC;  Surgeon: Micky Paredes III, MD;  Location: [facility];  Service: Endoscopy;  Laterality: N/A;    ESOPHAGOGASTRODUODENOSCOPY N/A 10/18/2019    Procedure: ESOPHAGOGASTRODUODENOSCOPY (EGD);  Surgeon: Gianluca Mendez MD;  Location: [facility];  Service: Endoscopy;  Laterality: N/A;     ESOPHAGOGASTRODUODENOSCOPY N/A 08/24/2022    Procedure: EGD (ESOPHAGOGASTRODUODENOSCOPY);  Surgeon: Micky Paredes III, MD;  Location: [facility] ENDO;  Service: Endoscopy;  Laterality: N/A;    ESOPHAGOGASTRODUODENOSCOPY N/A 12/05/2022    Procedure: EGD (ESOPHAGOGASTRODUODENOSCOPY);  Surgeon: Marcelo Zhong MD;  Location: [facility] ENDO;  Service: Endoscopy;  Laterality: N/A;    EYE SURGERY      INSERTION, CATHETER, TUNNELED N/A 06/17/2023    Procedure: Insertion,catheter,tunneled;  Surgeon: Carlos Thurman Jr., MD;  Location: [facility] OR;  Service: General;  Laterality: N/A;    LAPAROSCOPIC CHOLECYSTECTOMY N/A 07/30/2022    Procedure: CHOLECYSTECTOMY, LAPAROSCOPIC;  Surgeon: Grey Perez MD;  Location: [facility] OR;  Service: General;  Laterality: N/A;    PLACEMENT OF DUAL-LUMEN VASCULAR CATHETER Left 07/12/2022    Procedure: INSERTION-CATHETER-JOSEPH;  Surgeon: Dionte Gan MD;  Location: [facility] OR;  Service: General;  Laterality: Left;    PLACEMENT OF DUAL-LUMEN VASCULAR CATHETER Right 07/26/2022    Procedure: INSERTION-CATHETER-Hemosplit;  Surgeon: Dionte Gan MD;  Location: [facility] OR;  Service: General;  Laterality: Right;       Family History   Problem Relation Name Age of Onset    Diabetes Mother      Diabetes Father       Social History     Tobacco Use    Smoking status: Never    Smokeless tobacco: Never   Substance and Sexual Activity    Alcohol use: Not Currently    Drug use: No    Sexual activity: Not Currently     Partners: Male     Birth control/protection: I.U.D.      Review of Systems     Review of Systems   Constitutional: Negative.    HENT: Negative.     Eyes: Negative.    Respiratory: Negative.     Cardiovascular: Negative.    Gastrointestinal:  Positive for nausea. Negative for vomiting.   Endocrine: Negative.    Genitourinary: Negative.    Musculoskeletal: Negative.    Skin: Negative.    Allergic/Immunologic: Negative.    Neurological: Negative.    Hematological: Negative.   "  Psychiatric/Behavioral: Negative.     All other systems reviewed and are negative.       Physical Exam     Initial Vitals [08/28/24 0729]   BP Pulse Resp Temp SpO2   (!) 215/127 (!) 113 (!) 22 98.5 °F (36.9 °C) 98 %      MAP       --          Nursing notes and vital signs reviewed.  Constitutional: Patient is in moderate to severe distress. Patient holding emesis bag.   Head: Normocephalic. Atraumatic.   Eyes:  Conjunctivae are not pale. No scleral icterus.   ENT: Mucous membranes moist.   Neck: Supple.   Cardiovascular: Tachycardic. Regular rhythm.   Pulmonary: No respiratory distress.   Abdominal: Soft. Non-distended. Mild generalized abdominal tenderness.    Musculoskeletal: Moves all extremities. No obvious deformities.   Skin: Warm and dry.   Neurological:  Alert, awake, and appropriate. Normal speech. No acute lateralizing neurologic deficits appreciated.   Psychiatric: Normal affect.       ED Course   Procedures  Vitals:    08/28/24 0729 08/28/24 0745 08/28/24 0759 08/28/24 0848   BP: (!) 215/127  137/89 (!) 202/83   Pulse: (!) 113  103 106   Resp: (!) 22 20 20 17   Temp: 98.5 °F (36.9 °C)      SpO2: 98%  99% 95%   Weight: 53.1 kg (117 lb)      Height: 5' 2" (1.575 m)       08/28/24 0909 08/28/24 0937 08/28/24 0947   BP:      Pulse:      Resp:  (!) 22    Temp:      SpO2: (!) 92%  96%   Weight:      Height:        Lab Results Interpreted as Abnormal:  Labs Reviewed   CBC W/ AUTO DIFFERENTIAL - Abnormal       Result Value    WBC 8.08      RBC 3.12 (*)     Hemoglobin 9.1 (*)     Hematocrit 26.9 (*)     MCV 86      MCH 29.2      MCHC 33.8      RDW 17.2 (*)     Platelets 114 (*)     MPV 10.3      Immature Granulocytes 0.4      Gran # (ANC) 6.6      Immature Grans (Abs) 0.03      Lymph # 0.6 (*)     Mono # 0.7      Eos # 0.2      Baso # 0.01      nRBC 0      Gran % 81.2 (*)     Lymph % 7.3 (*)     Mono % 8.8      Eosinophil % 2.2      Basophil % 0.1      Differential Method Automated     COMPREHENSIVE METABOLIC " PANEL - Abnormal    Sodium 138      Potassium 4.3      Chloride 97      CO2 19 (*)     Glucose 498 (*)     BUN 54 (*)     Creatinine 9.5 (*)     Calcium 9.6      Total Protein 6.8      Albumin 3.3 (*)     Total Bilirubin 1.1 (*)     Alkaline Phosphatase 90      AST 12      ALT 17      eGFR 5.1 (*)     Anion Gap 22 (*)     Narrative:     Glucose critical result(s) called and verbal readback obtained from   Alejandrina Easley RN ED.   by [facility] 08/28/2024 08:59   BASIC METABOLIC PANEL - Abnormal    Sodium 139      Potassium 4.4      Chloride 101      CO2 21 (*)     Glucose 352 (*)     BUN 57 (*)     Creatinine 9.8 (*)     Calcium 9.2      Anion Gap 17 (*)     eGFR 4.9 (*)    POCT GLUCOSE - Abnormal    POCT Glucose 492 (*)    POCT GLUCOSE - Abnormal    POCT Glucose >500 (*)    LIPASE    Lipase 27     BASIC METABOLIC PANEL   POCT GLUCOSE MONITORING CONTINUOUS      All Lab Results:  Results for orders placed or performed during the hospital encounter of 08/28/24   CBC auto differential   Result Value Ref Range    WBC 8.08 3.90 - 12.70 K/uL    RBC 3.12 (L) 4.00 - 5.40 M/uL    Hemoglobin 9.1 (L) 12.0 - 16.0 g/dL    Hematocrit 26.9 (L) 37.0 - 48.5 %    MCV 86 82 - 98 fL    MCH 29.2 27.0 - 31.0 pg    MCHC 33.8 32.0 - 36.0 g/dL    RDW 17.2 (H) 11.5 - 14.5 %    Platelets 114 (L) 150 - 450 K/uL    MPV 10.3 9.2 - 12.9 fL    Immature Granulocytes 0.4 0.0 - 0.5 %    Gran # (ANC) 6.6 1.8 - 7.7 K/uL    Immature Grans (Abs) 0.03 0.00 - 0.04 K/uL    Lymph # 0.6 (L) 1.0 - 4.8 K/uL    Mono # 0.7 0.3 - 1.0 K/uL    Eos # 0.2 0.0 - 0.5 K/uL    Baso # 0.01 0.00 - 0.20 K/uL    nRBC 0 0 /100 WBC    Gran % 81.2 (H) 38.0 - 73.0 %    Lymph % 7.3 (L) 18.0 - 48.0 %    Mono % 8.8 4.0 - 15.0 %    Eosinophil % 2.2 0.0 - 8.0 %    Basophil % 0.1 0.0 - 1.9 %    Differential Method Automated    Comprehensive metabolic panel   Result Value Ref Range    Sodium 138 136 - 145 mmol/L    Potassium 4.3 3.5 - 5.1 mmol/L    Chloride 97 95 - 110 mmol/L    CO2 19 (L) 23  - 29 mmol/L    Glucose 498 (HH) 70 - 110 mg/dL    BUN 54 (H) 6 - 20 mg/dL    Creatinine 9.5 (H) 0.5 - 1.4 mg/dL    Calcium 9.6 8.7 - 10.5 mg/dL    Total Protein 6.8 6.0 - 8.4 g/dL    Albumin 3.3 (L) 3.5 - 5.2 g/dL    Total Bilirubin 1.1 (H) 0.1 - 1.0 mg/dL    Alkaline Phosphatase 90 55 - 135 U/L    AST 12 10 - 40 U/L    ALT 17 10 - 44 U/L    eGFR 5.1 (A) >60 mL/min/1.73 m^2    Anion Gap 22 (H) 8 - 16 mmol/L   Lipase   Result Value Ref Range    Lipase 27 4 - 60 U/L   Basic Metabolic Panel   Result Value Ref Range    Sodium 139 136 - 145 mmol/L    Potassium 4.4 3.5 - 5.1 mmol/L    Chloride 101 95 - 110 mmol/L    CO2 21 (L) 23 - 29 mmol/L    Glucose 352 (H) 70 - 110 mg/dL    BUN 57 (H) 6 - 20 mg/dL    Creatinine 9.8 (H) 0.5 - 1.4 mg/dL    Calcium 9.2 8.7 - 10.5 mg/dL    Anion Gap 17 (H) 8 - 16 mmol/L    eGFR 4.9 (A) >60 mL/min/1.73 m^2   POCT glucose   Result Value Ref Range    POCT Glucose 492 (HH) 70 - 110 mg/dL   POCT glucose   Result Value Ref Range    POCT Glucose >500 (HH) 70 - 110 mg/dL     Imaging Results    None        The EKG was ordered, reviewed, and independently interpreted by the ED Physician:  Rhythm: sinus tachycardia   Rate: 110 bpm  No ST-T changes concerning for acute ischemia  Extreme axis deviation    The emergency physician reviewed the vital signs and test results, which are outlined above.     ED Discussion     ED Course as of 08/28/24 1242   Wed Aug 28, 2024   0912 Patient requesting more pain medication.  [ND]   1241 Patient has a dialysis chair secured for 2:15 PM at her unit; discharging to dialysis.  [ND]      ED Course User Index  [ND] Akin Flores MD         ED Medication(s) Administered:  Medications   HYDROmorphone injection 1 mg (1 mg Intravenous Given 8/28/24 0745)   haloperidol lactate injection 2 mg (2 mg Intravenous Given 8/28/24 0746)   insulin regular injection 6 Units 0.06 mL (6 Units Intravenous Given 8/28/24 5741)   HYDROmorphone injection 0.5 mg (0.5 mg Intravenous  Given 8/28/24 0937)       Prescription Management: I performed a review of the patient's current Rx medication list as input by nursing staff.    Patient's Medications   New Prescriptions    No medications on file   Previous Medications    ACETAZOLAMIDE (DIAMOX) 250 MG TABLET    take 1 tablet by mouth 3 times a day    ALBUTEROL (VENTOLIN HFA) 90 MCG/ACTUATION INHALER    Inhale 2 puffs every 4 hours by inhalation route.    APIXABAN (ELIQUIS) 5 MG TAB    Take 1 tablet (5 mg total) by mouth 2 (two) times daily. Patient w/ thrombocytopenia <50, so held during admission, follow-up with hematologist about restarting    ATROPINE 1% (ISOPTO ATROPINE) 1 % DROP    Place 1 drop into the left eye 2 (two) times a day.    BLOOD SUGAR DIAGNOSTIC (TRUE METRIX GLUCOSE TEST STRIP) STRP    Use 1 strip to check blood glucose three times daily    BLOOD-GLUCOSE METER (TRUE METRIX GLUCOSE METER) MISC    Use to check blood glucose    BLOOD-GLUCOSE METER,CONTINUOUS (DEXCOM ) MISC    USE WITH SENSORS    BLOOD-GLUCOSE SENSOR (DEXCOM G7 SENSOR) ELIZABETH    Use as directed for CGM. Change sensor every 10 days    BLOOD-GLUCOSE SENSOR ELIZABETH    CHANGE EVERY 10 DAYS    BRIMONIDINE 0.15 % OPTH DROP (ALPHAGAN) 0.15 % OPHTHALMIC SOLUTION    Place 1 drop into both eyes 3 (three) times daily.    BRINZOLAMIDE (AZOPT) 1 % OPHTHALMIC SUSPENSION    Place1 drop in left eye 3 times a day for 30 days    BUSPIRONE (BUSPAR) 10 MG TABLET    Take 1 tablet (10 mg total) by mouth 3 (three) times daily as needed (anxiety).    CETIRIZINE (ZYRTEC) 10 MG TABLET    Take 1 tablet every day by oral route.    DORZOLAMIDE-TIMOLOL 2-0.5% (COSOPT) 22.3-6.8 MG/ML OPHTHALMIC SOLUTION    place 1 drop in left eye twice a day  for 30 days    FLUOXETINE 40 MG CAPSULE    Take 1 capsule every day by oral route.    FLUTICASONE PROPIONATE (FLONASE ALLERGY RELIEF) 50 MCG/ACTUATION NASAL SPRAY    [place] 1 spray every day by intranasal route.    GABAPENTIN (NEURONTIN) 300 MG CAPSULE     "Take 2 capsules twice a day by oral route for 90 days.    HYDRALAZINE (APRESOLINE) 25 MG TABLET    take 1 tablet by mouth every 8 hours    INSULIN ASPART U-100 (NOVOLOG) 100 UNIT/ML (3 ML) INPN PEN    Inject 8 Units into the skin 3 (three) times daily with meals.    INSULIN GLARGINE U-100, LANTUS, (LANTUS SOLOSTAR U-100 INSULIN) 100 UNIT/ML (3 ML) INPN PEN    Inject 22 units every day by subcutaneous route in the evening for 30 days, for diabetes.    LANCETS (TRUEPLUS LANCETS) 33 GAUGE MISC    Use 1 to check blood glucose three times daily    LANCETS 33 GAUGE MISC    Use to test twice daily    LEVETIRACETAM (KEPPRA) 500 MG TAB    Take 1 tablet (500 mg total) by mouth 2 (two) times daily.    LORAZEPAM (ATIVAN) 0.5 MG TABLET    Take 1 tablet 3 times a day by oral route for 7 days, for severe panic.    ONDANSETRON (ZOFRAN-ODT) 4 MG TBDL    Place 1 tablet (4 mg) on or under the tongue every 8 hours for 10 days.    OXYCODONE-ACETAMINOPHEN (PERCOCET)  MG PER TABLET    Take 1 tablet by mouth every 4 (four) hours as needed for Pain.    PANTOPRAZOLE (PROTONIX) 40 MG TABLET    Take 1 tablet (40 mg total) by mouth once daily.    PEN NEEDLE, DIABETIC 31 GAUGE X 3/16" NDLE    Use as directed with insulin once daily    PREDNISOLONE ACETATE (PRED FORTE) 1 % DRPS    Place 1 drop into the left eye 2 (two) times daily.    PROMETHAZINE (PHENERGAN) 25 MG TABLET    Take 1 tablet (25 mg total) by mouth every 6 (six) hours as needed.    SEVELAMER CARBONATE (RENVELA) 800 MG TAB    Take 1 tablet (800 mg total) by mouth 3 (three) times daily with meals.    SUCRALFATE (CARAFATE) 1 GRAM TABLET    Take 1 tablet (1 g total) by mouth 4 (four) times daily.    TIMOLOL MALEATE 0.5% (TIMOPTIC) 0.5 % DROP    Place 1 drop in left eye 2 times a day for 30 days   Modified Medications    No medications on file   Discontinued Medications    No medications on file         Follow-up Information       Melony Kim NP. Schedule an appointment as " soon as possible for a visit in 1 day.    Specialty: Family Medicine  Contact information:  [place] [id number]  [phone]               [facility] Emergency Dept.    Specialty: Emergency Medicine  Why: As needed, If symptoms worsen  Contact information:  [address] [facility] [address] [phone]                          Clinical Impression       ICD-10-CM ICD-9-CM   1. ESRD needing dialysis  N18.6 585.6    Z99.2    2. Chest pain  R07.9 786.50   3. Severe hyperglycemia due to diabetes mellitus  E11.65 250.00   4. Chronic abdominal pain  R10.9 789.00    G89.29 338.29   5. Diabetic gastroparesis  E11.43 250.60    K31.84 536.3      ED Disposition Condition    Discharge Stable             Akin Flores MD  08/28/24 1681

## 2024-08-29 LAB
ALBUMIN SERPL BCP-MCNC: 3.4 G/DL (ref 3.5–5.2)
ALP SERPL-CCNC: 82 U/L (ref 55–135)
ALT SERPL W/O P-5'-P-CCNC: 11 U/L (ref 10–44)
ANION GAP SERPL CALC-SCNC: 14 MMOL/L (ref 8–16)
ANION GAP SERPL CALC-SCNC: 16 MMOL/L (ref 8–16)
AST SERPL-CCNC: 12 U/L (ref 10–40)
B-OH-BUTYR BLD STRIP-SCNC: 0 MMOL/L (ref 0–0.5)
BASOPHILS # BLD AUTO: 0.02 K/UL (ref 0–0.2)
BASOPHILS NFR BLD: 0.3 % (ref 0–1.9)
BILIRUB SERPL-MCNC: 0.8 MG/DL (ref 0.1–1)
BUN SERPL-MCNC: 59 MG/DL (ref 6–20)
BUN SERPL-MCNC: 61 MG/DL (ref 6–20)
CALCIUM SERPL-MCNC: 8.6 MG/DL (ref 8.7–10.5)
CALCIUM SERPL-MCNC: 8.7 MG/DL (ref 8.7–10.5)
CHLORIDE SERPL-SCNC: 101 MMOL/L (ref 95–110)
CHLORIDE SERPL-SCNC: 103 MMOL/L (ref 95–110)
CO2 SERPL-SCNC: 21 MMOL/L (ref 23–29)
CO2 SERPL-SCNC: 24 MMOL/L (ref 23–29)
CREAT SERPL-MCNC: 9.3 MG/DL (ref 0.5–1.4)
CREAT SERPL-MCNC: 9.5 MG/DL (ref 0.5–1.4)
DIFFERENTIAL METHOD BLD: ABNORMAL
EOSINOPHIL # BLD AUTO: 0.1 K/UL (ref 0–0.5)
EOSINOPHIL NFR BLD: 1.8 % (ref 0–8)
ERYTHROCYTE [DISTWIDTH] IN BLOOD BY AUTOMATED COUNT: 17.2 % (ref 11.5–14.5)
EST. GFR  (NO RACE VARIABLE): 5.1 ML/MIN/1.73 M^2
EST. GFR  (NO RACE VARIABLE): 5.2 ML/MIN/1.73 M^2
GLUCOSE SERPL-MCNC: 112 MG/DL (ref 70–110)
GLUCOSE SERPL-MCNC: 136 MG/DL (ref 70–110)
GLUCOSE SERPL-MCNC: 137 MG/DL (ref 70–110)
GLUCOSE SERPL-MCNC: 161 MG/DL (ref 70–110)
GLUCOSE SERPL-MCNC: 188 MG/DL (ref 70–110)
GLUCOSE SERPL-MCNC: 354 MG/DL (ref 70–110)
GLUCOSE SERPL-MCNC: 389 MG/DL (ref 70–110)
GLUCOSE SERPL-MCNC: 60 MG/DL (ref 70–110)
GLUCOSE SERPL-MCNC: 64 MG/DL (ref 70–110)
HCG SERPL QL: NEGATIVE
HCT VFR BLD AUTO: 28.7 % (ref 37–48.5)
HGB BLD-MCNC: 9.6 G/DL (ref 12–16)
IMM GRANULOCYTES # BLD AUTO: 0.04 K/UL (ref 0–0.04)
IMM GRANULOCYTES NFR BLD AUTO: 0.5 % (ref 0–0.5)
LYMPHOCYTES # BLD AUTO: 0.8 K/UL (ref 1–4.8)
LYMPHOCYTES NFR BLD: 10.1 % (ref 18–48)
MAGNESIUM SERPL-MCNC: 2.4 MG/DL (ref 1.6–2.6)
MCH RBC QN AUTO: 30.1 PG (ref 27–31)
MCHC RBC AUTO-ENTMCNC: 33.4 G/DL (ref 32–36)
MCV RBC AUTO: 90 FL (ref 82–98)
MONOCYTES # BLD AUTO: 1.1 K/UL (ref 0.3–1)
MONOCYTES NFR BLD: 13.4 % (ref 4–15)
NEUTROPHILS # BLD AUTO: 5.9 K/UL (ref 1.8–7.7)
NEUTROPHILS NFR BLD: 73.9 % (ref 38–73)
NRBC BLD-RTO: 0 /100 WBC
PHOSPHATE SERPL-MCNC: 6.8 MG/DL (ref 2.7–4.5)
PLATELET # BLD AUTO: 156 K/UL (ref 150–450)
PMV BLD AUTO: 11.2 FL (ref 9.2–12.9)
POTASSIUM SERPL-SCNC: 4.1 MMOL/L (ref 3.5–5.1)
POTASSIUM SERPL-SCNC: 5.1 MMOL/L (ref 3.5–5.1)
PROT SERPL-MCNC: 6.3 G/DL (ref 6–8.4)
RBC # BLD AUTO: 3.19 M/UL (ref 4–5.4)
SODIUM SERPL-SCNC: 138 MMOL/L (ref 136–145)
SODIUM SERPL-SCNC: 141 MMOL/L (ref 136–145)
WBC # BLD AUTO: 8 K/UL (ref 3.9–12.7)

## 2024-08-29 PROCEDURE — 94761 N-INVAS EAR/PLS OXIMETRY MLT: CPT

## 2024-08-29 PROCEDURE — 90935 HEMODIALYSIS ONE EVALUATION: CPT

## 2024-08-29 PROCEDURE — 96374 THER/PROPH/DIAG INJ IV PUSH: CPT | Mod: 59

## 2024-08-29 PROCEDURE — 85025 COMPLETE CBC W/AUTO DIFF WBC: CPT | Performed by: INTERNAL MEDICINE

## 2024-08-29 PROCEDURE — G0378 HOSPITAL OBSERVATION PER HR: HCPCS

## 2024-08-29 PROCEDURE — 96375 TX/PRO/DX INJ NEW DRUG ADDON: CPT

## 2024-08-29 PROCEDURE — 96376 TX/PRO/DX INJ SAME DRUG ADON: CPT

## 2024-08-29 PROCEDURE — 25000003 PHARM REV CODE 250: Performed by: INTERNAL MEDICINE

## 2024-08-29 PROCEDURE — 82010 KETONE BODYS QUAN: CPT | Performed by: INTERNAL MEDICINE

## 2024-08-29 PROCEDURE — 96366 THER/PROPH/DIAG IV INF ADDON: CPT

## 2024-08-29 PROCEDURE — 63600175 PHARM REV CODE 636 W HCPCS: Performed by: INTERNAL MEDICINE

## 2024-08-29 PROCEDURE — 84100 ASSAY OF PHOSPHORUS: CPT | Performed by: INTERNAL MEDICINE

## 2024-08-29 PROCEDURE — 96361 HYDRATE IV INFUSION ADD-ON: CPT

## 2024-08-29 PROCEDURE — 27000221 HC OXYGEN, UP TO 24 HOURS

## 2024-08-29 PROCEDURE — 83735 ASSAY OF MAGNESIUM: CPT | Performed by: INTERNAL MEDICINE

## 2024-08-29 PROCEDURE — S5010 5% DEXTROSE AND 0.45% SALINE: HCPCS | Performed by: INTERNAL MEDICINE

## 2024-08-29 PROCEDURE — 99900031 HC PATIENT EDUCATION (STAT)

## 2024-08-29 PROCEDURE — G0257 UNSCHED DIALYSIS ESRD PT HOS: HCPCS

## 2024-08-29 PROCEDURE — 25000003 PHARM REV CODE 250: Performed by: HOSPITALIST

## 2024-08-29 PROCEDURE — 36415 COLL VENOUS BLD VENIPUNCTURE: CPT | Performed by: INTERNAL MEDICINE

## 2024-08-29 PROCEDURE — 63600175 PHARM REV CODE 636 W HCPCS: Mod: JZ,JG | Performed by: INTERNAL MEDICINE

## 2024-08-29 PROCEDURE — 80053 COMPREHEN METABOLIC PANEL: CPT | Performed by: INTERNAL MEDICINE

## 2024-08-29 RX ORDER — FLUOXETINE HYDROCHLORIDE 20 MG/1
40 CAPSULE ORAL DAILY
Status: DISCONTINUED | OUTPATIENT
Start: 2024-08-29 | End: 2024-08-31 | Stop reason: HOSPADM

## 2024-08-29 RX ORDER — LEVETIRACETAM 500 MG/1
500 TABLET ORAL 2 TIMES DAILY
Status: DISCONTINUED | OUTPATIENT
Start: 2024-08-29 | End: 2024-08-31 | Stop reason: HOSPADM

## 2024-08-29 RX ORDER — HYDROMORPHONE HYDROCHLORIDE 1 MG/ML
0.5 INJECTION, SOLUTION INTRAMUSCULAR; INTRAVENOUS; SUBCUTANEOUS ONCE
Status: COMPLETED | OUTPATIENT
Start: 2024-08-29 | End: 2024-08-29

## 2024-08-29 RX ORDER — INSULIN ASPART 100 [IU]/ML
0-10 INJECTION, SOLUTION INTRAVENOUS; SUBCUTANEOUS
Status: DISCONTINUED | OUTPATIENT
Start: 2024-08-29 | End: 2024-08-31 | Stop reason: HOSPADM

## 2024-08-29 RX ORDER — BUSPIRONE HYDROCHLORIDE 5 MG/1
10 TABLET ORAL 2 TIMES DAILY
Status: DISCONTINUED | OUTPATIENT
Start: 2024-08-29 | End: 2024-08-31 | Stop reason: HOSPADM

## 2024-08-29 RX ORDER — SODIUM CHLORIDE 9 MG/ML
INJECTION, SOLUTION INTRAVENOUS
Status: CANCELLED | OUTPATIENT
Start: 2024-08-29

## 2024-08-29 RX ORDER — SODIUM CHLORIDE 9 MG/ML
INJECTION, SOLUTION INTRAVENOUS ONCE
Status: CANCELLED | OUTPATIENT
Start: 2024-08-29 | End: 2024-08-29

## 2024-08-29 RX ORDER — IBUPROFEN 200 MG
16 TABLET ORAL
Status: DISCONTINUED | OUTPATIENT
Start: 2024-08-29 | End: 2024-08-31 | Stop reason: HOSPADM

## 2024-08-29 RX ORDER — MUPIROCIN 20 MG/G
OINTMENT TOPICAL 2 TIMES DAILY
Status: CANCELLED | OUTPATIENT
Start: 2024-08-29 | End: 2024-09-03

## 2024-08-29 RX ORDER — INSULIN GLARGINE 100 [IU]/ML
10 INJECTION, SOLUTION SUBCUTANEOUS DAILY
Status: DISCONTINUED | OUTPATIENT
Start: 2024-08-29 | End: 2024-08-31

## 2024-08-29 RX ORDER — GABAPENTIN 300 MG/1
300 CAPSULE ORAL 2 TIMES DAILY
Status: DISCONTINUED | OUTPATIENT
Start: 2024-08-29 | End: 2024-08-31 | Stop reason: HOSPADM

## 2024-08-29 RX ORDER — GLUCAGON 1 MG
1 KIT INJECTION
Status: DISCONTINUED | OUTPATIENT
Start: 2024-08-29 | End: 2024-08-31 | Stop reason: HOSPADM

## 2024-08-29 RX ORDER — DIPHENHYDRAMINE HCL 25 MG
25 CAPSULE ORAL EVERY 6 HOURS PRN
Status: DISCONTINUED | OUTPATIENT
Start: 2024-08-29 | End: 2024-08-31 | Stop reason: HOSPADM

## 2024-08-29 RX ORDER — INSULIN GLARGINE 100 [IU]/ML
10 INJECTION, SOLUTION SUBCUTANEOUS DAILY
Status: DISCONTINUED | OUTPATIENT
Start: 2024-08-29 | End: 2024-08-29

## 2024-08-29 RX ORDER — HYDRALAZINE HYDROCHLORIDE 25 MG/1
50 TABLET, FILM COATED ORAL EVERY 8 HOURS
Status: DISCONTINUED | OUTPATIENT
Start: 2024-08-29 | End: 2024-08-31 | Stop reason: HOSPADM

## 2024-08-29 RX ORDER — HEPARIN SODIUM 5000 [USP'U]/ML
5000 INJECTION, SOLUTION INTRAVENOUS; SUBCUTANEOUS
Status: CANCELLED | OUTPATIENT
Start: 2024-08-29

## 2024-08-29 RX ORDER — IBUPROFEN 200 MG
24 TABLET ORAL
Status: DISCONTINUED | OUTPATIENT
Start: 2024-08-29 | End: 2024-08-31 | Stop reason: HOSPADM

## 2024-08-29 RX ORDER — MAGNESIUM SULFATE HEPTAHYDRATE 40 MG/ML
INJECTION, SOLUTION INTRAVENOUS
Status: DISPENSED
Start: 2024-08-29 | End: 2024-08-29

## 2024-08-29 RX ORDER — HYDROMORPHONE HYDROCHLORIDE 1 MG/ML
0.5 INJECTION, SOLUTION INTRAMUSCULAR; INTRAVENOUS; SUBCUTANEOUS
Status: COMPLETED | OUTPATIENT
Start: 2024-08-29 | End: 2024-08-29

## 2024-08-29 RX ADMIN — EPOETIN ALFA-EPBX 2700 UNITS: 10000 INJECTION, SOLUTION INTRAVENOUS; SUBCUTANEOUS at 01:08

## 2024-08-29 RX ADMIN — DIPHENHYDRAMINE HYDROCHLORIDE 25 MG: 25 CAPSULE ORAL at 02:08

## 2024-08-29 RX ADMIN — LEVETIRACETAM 500 MG: 500 TABLET, FILM COATED ORAL at 09:08

## 2024-08-29 RX ADMIN — HYDROMORPHONE HYDROCHLORIDE 0.5 MG: 1 INJECTION, SOLUTION INTRAMUSCULAR; INTRAVENOUS; SUBCUTANEOUS at 04:08

## 2024-08-29 RX ADMIN — HYDROMORPHONE HYDROCHLORIDE 0.5 MG: 1 INJECTION, SOLUTION INTRAMUSCULAR; INTRAVENOUS; SUBCUTANEOUS at 09:08

## 2024-08-29 RX ADMIN — Medication 6 MG: at 09:08

## 2024-08-29 RX ADMIN — METOCLOPRAMIDE HYDROCHLORIDE 5 MG: 5 INJECTION INTRAMUSCULAR; INTRAVENOUS at 05:08

## 2024-08-29 RX ADMIN — METOCLOPRAMIDE HYDROCHLORIDE 5 MG: 5 INJECTION INTRAMUSCULAR; INTRAVENOUS at 11:08

## 2024-08-29 RX ADMIN — DEXTROSE AND SODIUM CHLORIDE 125 ML/HR: 5; 450 INJECTION, SOLUTION INTRAVENOUS at 01:08

## 2024-08-29 RX ADMIN — LEVETIRACETAM 500 MG: 500 TABLET, FILM COATED ORAL at 08:08

## 2024-08-29 RX ADMIN — HYDROMORPHONE HYDROCHLORIDE 0.5 MG: 1 INJECTION, SOLUTION INTRAMUSCULAR; INTRAVENOUS; SUBCUTANEOUS at 05:08

## 2024-08-29 RX ADMIN — SENNOSIDES AND DOCUSATE SODIUM 1 TABLET: 50; 8.6 TABLET ORAL at 09:08

## 2024-08-29 RX ADMIN — BUSPIRONE HYDROCHLORIDE 10 MG: 5 TABLET ORAL at 08:08

## 2024-08-29 RX ADMIN — FLUOXETINE HYDROCHLORIDE 40 MG: 20 CAPSULE ORAL at 08:08

## 2024-08-29 RX ADMIN — MORPHINE SULFATE 10 MG: 10 INJECTION INTRAVENOUS at 02:08

## 2024-08-29 RX ADMIN — APIXABAN 5 MG: 5 TABLET, FILM COATED ORAL at 08:08

## 2024-08-29 RX ADMIN — SENNOSIDES AND DOCUSATE SODIUM 1 TABLET: 50; 8.6 TABLET ORAL at 08:08

## 2024-08-29 RX ADMIN — GABAPENTIN 300 MG: 300 CAPSULE ORAL at 08:08

## 2024-08-29 RX ADMIN — GABAPENTIN 300 MG: 300 CAPSULE ORAL at 09:08

## 2024-08-29 RX ADMIN — HYDROMORPHONE HYDROCHLORIDE 0.5 MG: 0.5 INJECTION, SOLUTION INTRAMUSCULAR; INTRAVENOUS; SUBCUTANEOUS at 06:08

## 2024-08-29 RX ADMIN — APIXABAN 5 MG: 5 TABLET, FILM COATED ORAL at 09:08

## 2024-08-29 RX ADMIN — HYDRALAZINE HYDROCHLORIDE 50 MG: 25 TABLET ORAL at 09:08

## 2024-08-29 RX ADMIN — BUSPIRONE HYDROCHLORIDE 10 MG: 5 TABLET ORAL at 09:08

## 2024-08-29 RX ADMIN — FAMOTIDINE 20 MG: 10 INJECTION, SOLUTION INTRAVENOUS at 09:08

## 2024-08-29 RX ADMIN — HYDROMORPHONE HYDROCHLORIDE 0.5 MG: 1 INJECTION, SOLUTION INTRAMUSCULAR; INTRAVENOUS; SUBCUTANEOUS at 11:08

## 2024-08-29 RX ADMIN — METOCLOPRAMIDE HYDROCHLORIDE 5 MG: 5 INJECTION INTRAMUSCULAR; INTRAVENOUS at 06:08

## 2024-08-29 NOTE — ASSESSMENT & PLAN NOTE
ESRD on HD    Missed a HD session she said     No urgent reason to dialyze tonight       I consulted nephrology for morning and let them know she's here

## 2024-08-29 NOTE — HPI
Tabby Howard is a 34 y.o. female with PMH as below who presents to the [facility] emergency department for evaluation of acute on chronic generalized abdominal pain, worse in the left flank, identical to prior episodes diagnosed as gastroparesis. Patient has ESRD and is on HD TThSa but missed last session due to transportation difficulties.        She has a PMH of DM on insulin , and ESRD on HD and HTN.      She was here this month for DKA and sent home but said she never really felt better and ended up being sent back here for DKA again and was just discharged few days ago for that.  That was 8/26 8/26 DC Summary    Hospital Course:   34F p/w 10/10 abdominal pain, located diffusely throughout, with associated vomiting. Patient reports that this level of pain is not typical for her gastroparesis. She reports she is unable to manage this discomfort at home. She presented for this discomfort to the [place] ED on 8/22/24 PM and was discharged early 8/23/24 AM; she reported that she never truly felt better, but she did go to HD on 8/24/24, and then decided to seek medical attention at the [facility] ED thereafter. She does not believe that she had additional fluid removed during the 8/24/24 HD session.   Patient admitted with DKA. Off pathway. Transitioned to SSI. Advanced to clear liquid diet.. SSI started. Resume lantus if tolerating oral diet. Very brittle type 1 diabetic. Dropped glucose on insulin drip to 50-60s. Infusion shut off and transferred to med surg floor.  In the med surg her sugars and other labs were well controlled.  Just feels mild low back pain but other than that feels back to her baseline.  Able to be discharged home and per pharmacy check it appears she has not had any mealtime insulin feel even though she says she takes it every day in addition to her Lantus.  I will discharge her home with a refill for her mealtime insulin as her sugars are well controlled here on her normal outpatient  regimen, she just needs to take it consistently and he consistent meals.           She tells me that she never checked her sugars since going home from here on the 26th because her glucose monitor , dexcom I think , was not working or she needed a piece to it she said.   So because of that she was NOT taking any insulin she said since being home and was not eating.  Said she didn't want to eat     Then started getting nausea vomiting and abdominal pains again . Same as before.     So she went to [place].   They said its gastroparesis and sent her home    Her labs there did show hyperglycemia and Metabolic acidosis with Anion gap . However no ketones checked.  No BHB checked so I can't say if she was in DKA then or not      But she came here next to our ER      DKA in our ER was identified quickly and Insulin drip started and IVFs    Her BP was very high also so they did Cardene drip        Plan was to admit to ICU For DKA

## 2024-08-29 NOTE — ED PROVIDER NOTES
"Encounter Date: 8/28/2024       History     Chief Complaint   Patient presents with    Abdominal Pain     Pt complains of severe lower quad ABD pain accompanied by bilateral kidney pain. Pt was seen at [facility] and was told she needed dialysis. Pt was unable to receive dialysis due to pain and was sent to ED for further eval.     CHAYA Howard is a 34 y.o. female with a past medical history of insulin-dependent type 2 diabetes, hypertension, ESRD with last dialysis on Saturday, and chronic abdominal pain that presented emergency department for left-sided flank pain.   Per chart review, patient has had multiple similar episodes in the past and had multiple CT scans which were performed.  At this time, she is not complaining of any nausea or vomiting, but she is complaining of significant pain.  Pain is similar in presentation to her previous left-sided flank pains.  She was seen earlier today at an outside facility for left-sided flank pain.  She received 2 doses of Dilaudid, an insulin shot, and Haldol.  At that time, her CMP showed a glucose in the 400s with anion gap of 20.  She was discharged to dialysis.  Re-presented to [facility] because she had continued pain.  History is somewhat limited as patient is writhing on the bed in pain.    Review of patient's allergies indicates:   Allergen Reactions    Droperidol Hives    Haldol [haloperidol lactate] Hives    Penicillins Hives    Droperidol (bulk) Anxiety     STATES "FREAKS OUT".  TOLERATES HALDOL PRIOR TO ARRIVAL AT ANOTHER FACILITY TODAY.      Past Medical History:   Diagnosis Date    ESRD on hemodialysis 04/12/2022    Gastritis 12/2022    EGD was 12/5/22    Gastroparesis 04/12/2022    has not had Emptying study    Heart failure with preserved ejection fraction 04/12/2022    EF 55% on 3/22    History of supraventricular tachycardia     Hyperkalemia 07/07/2022    Hypertensive emergency 07/08/2022    Sickle cell trait 04/12/2022    Type 2 diabetes mellitus  "     Past Surgical History:   Procedure Laterality Date     SECTION      x 3    COLONOSCOPY      COLONOSCOPY N/A 2022    Procedure: COLONOSCOPY;  Surgeon: Jagdeep Cedeno MD;  Location: [facility];  Service: Endoscopy;  Laterality: N/A;    DESTRUCTION, CILIARY BODY, USING LASER Left 2024    Procedure: Cyclophotocoagulation G-probe, retrobulbar chlorpromazine left eye;  Surgeon: Fidel Wise MD;  Location: [address];  Service: Ophthalmology;  Laterality: Left;    ENDOSCOPIC ULTRASOUND OF UPPER GASTROINTESTINAL TRACT N/A 2023    Procedure: ULTRASOUND, UPPER GI TRACT, ENDOSCOPIC;  Surgeon: Micky Paredes III, MD;  Location: [facility];  Service: Endoscopy;  Laterality: N/A;    ESOPHAGOGASTRODUODENOSCOPY N/A 10/18/2019    Procedure: ESOPHAGOGASTRODUODENOSCOPY (EGD);  Surgeon: Gianlcua Mendez MD;  Location: [facility];  Service: Endoscopy;  Laterality: N/A;    ESOPHAGOGASTRODUODENOSCOPY N/A 2022    Procedure: EGD (ESOPHAGOGASTRODUODENOSCOPY);  Surgeon: Micky Paredes III, MD;  Location: [facility];  Service: Endoscopy;  Laterality: N/A;    ESOPHAGOGASTRODUODENOSCOPY N/A 2022    Procedure: EGD (ESOPHAGOGASTRODUODENOSCOPY);  Surgeon: Marcelo Zhong MD;  Location: [facility];  Service: Endoscopy;  Laterality: N/A;    EYE SURGERY      INSERTION, CATHETER, TUNNELED N/A 2023    Procedure: Insertion,catheter,tunneled;  Surgeon: Carlos Thurman Jr., MD;  Location: [facility] OR;  Service: General;  Laterality: N/A;    LAPAROSCOPIC CHOLECYSTECTOMY N/A 2022    Procedure: CHOLECYSTECTOMY, LAPAROSCOPIC;  Surgeon: Grey Perez MD;  Location: [facility] OR;  Service: General;  Laterality: N/A;    PLACEMENT OF DUAL-LUMEN VASCULAR CATHETER Left 2022    Procedure: INSERTION-CATHETER-JOSEPH;  Surgeon: Dionte Gan MD;  Location: [facility] OR;  Service: General;  Laterality: Left;    PLACEMENT OF DUAL-LUMEN VASCULAR CATHETER Right 2022    Procedure:  INSERTION-CATHETER-Hemosplit;  Surgeon: Dionte Gan MD;  Location: [facility];  Service: General;  Laterality: Right;     Family History   Problem Relation Name Age of Onset    Diabetes Mother      Diabetes Father       Social History     Tobacco Use    Smoking status: Never    Smokeless tobacco: Never   Substance Use Topics    Alcohol use: Not Currently    Drug use: No     Review of Systems   Constitutional:  Negative for fever.   HENT:  Negative for sore throat.    Respiratory:  Negative for shortness of breath.    Cardiovascular:  Negative for chest pain.   Gastrointestinal:  Positive for abdominal pain. Negative for constipation, diarrhea, nausea and vomiting.   Genitourinary:  Negative for dysuria.   Musculoskeletal:  Negative for back pain.   Skin:  Negative for rash.   Neurological:  Negative for weakness.   Hematological:  Does not bruise/bleed easily.       Physical Exam     Initial Vitals [08/28/24 1521]   BP Pulse Resp Temp SpO2   (!) 216/118 107 20 98.4 °F (36.9 °C) 95 %      MAP       --         Physical Exam    Nursing note and vitals reviewed.  Constitutional: She appears well-developed and well-nourished.   HENT:   Head: Normocephalic and atraumatic.   Eyes: EOM are normal. Pupils are equal, round, and reactive to light.   Neck:   Normal range of motion.  Cardiovascular:  Normal rate, regular rhythm and normal heart sounds.           Pulmonary/Chest: Breath sounds normal. No respiratory distress. She has no wheezes. She has no rhonchi. She has no rales.   Abdominal: Abdomen is soft. She exhibits no distension. There is abdominal tenderness (Left flank and left CVA.). There is no rebound.   Musculoskeletal:         General: Normal range of motion.      Cervical back: Normal range of motion.      Comments: Left upper extremity fistula.     Neurological: She is alert and oriented to person, place, and time. She has normal strength. No cranial nerve deficit or sensory deficit. GCS score is 15. GCS eye  subscore is 4. GCS verbal subscore is 5. GCS motor subscore is 6.   Skin: Capillary refill takes less than 2 seconds. No rash noted.   Psychiatric: She has a normal mood and affect. Thought content normal.         ED Course   Critical Care    Date/Time: 8/29/2024 11:57 AM    Performed by: Korey Morales MD  Authorized by: Korey Morales MD  Direct patient critical care time: 20 minutes  Additional history critical care time: 10 minutes  Ordering / reviewing critical care time: 10 minutes  Documentation critical care time: 10 minutes  Consulting other physicians critical care time: 5 minutes  Total critical care time (exclusive of procedural time) : 55 minutes  Critical care was necessary to treat or prevent imminent or life-threatening deterioration of the following conditions: endocrine crisis and cardiac failure.  Critical care was time spent personally by me on the following activities: re-evaluation of patient's condition, pulse oximetry, ordering and review of radiographic studies, ordering and review of laboratory studies, ordering and performing treatments and interventions, obtaining history from patient or surrogate, examination of patient, discussions with consultants, evaluation of patient's response to treatment, interpretation of cardiac output measurements and review of old charts.        Labs Reviewed   CBC W/ AUTO DIFFERENTIAL - Abnormal       Result Value    WBC 8.74      RBC 3.25 (*)     Hemoglobin 9.8 (*)     Hematocrit 28.9 (*)     MCV 89      MCH 30.2      MCHC 33.9      RDW 17.4 (*)     Platelets 119 (*)     MPV 10.7      Immature Granulocytes 0.5      Gran # (ANC) 7.2      Immature Grans (Abs) 0.04      Lymph # 0.5 (*)     Mono # 0.9      Eos # 0.2      Baso # 0.01      nRBC 0      Gran % 82.1 (*)     Lymph % 5.4 (*)     Mono % 9.8      Eosinophil % 2.1      Basophil % 0.1      Differential Method Automated     COMPREHENSIVE METABOLIC PANEL - Abnormal    Sodium 135 (*)      Potassium 5.1      Chloride 95      CO2 20 (*)     Glucose 396 (*)     BUN 56 (*)     Creatinine 8.7 (*)     Calcium 9.5      Total Protein 7.6      Albumin 4.2      Total Bilirubin 1.2 (*)     Alkaline Phosphatase 99      AST 24      ALT 16      eGFR 5.6 (*)     Anion Gap 20 (*)    LIPASE   LIPASE    Lipase 17     BETA - HYDROXYBUTYRATE, SERUM   HCG, QUANTITATIVE   LACTIC ACID, PLASMA   HCG, QUANTITATIVE   BETA - HYDROXYBUTYRATE, SERUM          Imaging Results    None          Medications   lactated ringers bolus 500 mL (has no administration in time range)   HYDROmorphone injection 1 mg (has no administration in time range)   droPERidol injection 1.25 mg (1.25 mg Intravenous Not Given 8/28/24 1945)   labetaloL injection 10 mg (has no administration in time range)   HYDROmorphone injection 1 mg (1 mg Intravenous Given 8/28/24 1658)   ondansetron injection 4 mg (4 mg Intravenous Given 8/28/24 1649)   hydrALAZINE injection 10 mg (10 mg Intravenous Given 8/28/24 1743)   hydrALAZINE tablet 25 mg (25 mg Oral Given 8/28/24 1744)   carvediloL tablet 25 mg (25 mg Oral Given 8/28/24 1744)   amLODIPine tablet 10 mg (10 mg Oral Given 8/28/24 1744)     Medical Decision Making  Tabby Howard is a 34 y.o. female with a past medical history of insulin-dependent type 2 diabetes, hypertension, ESRD with last dialysis on Saturday, and chronic abdominal pain that presented emergency department for left-sided flank pain.  Initial vitals with blood pressure of 216/118 and heart rate of 107.  Exam with uncomfortable appearing female.  Writhing around in the bed in pain.  Left-sided flank tenderness.  Without peritoneal signs.  Presentation most consistent with acute exacerbation of chronic abdominal pain.    Amount and/or Complexity of Data Reviewed  Labs: ordered.     Details: VBG shows pH of 7.283.  Lactic was normal.  CMP shows glucose of 396 with bicarb of 20.  Anion gap of 20.  Creatinine is 8.7 and BUN is 56.  This is consistent  with known ESRD.  Lipase is negative.  Beta quant is negative.  Beta hydroxybutyrate is elevated to 1.6.  Troponin elevated to 116.  Radiology: ordered.     Details: CT abdomen is pending.  ECG/medicine tests: ordered and independent interpretation performed.     Details: EKG shows sinus rhythm without acute ST segment or T-wave changes. similar to previous.  Discussion of management or test interpretation with external provider(s): Patient is markedly hypertensive and was given hydralazine, labetalol, and home blood pressure medications of Coreg, amlodipine, and hydralazine.  Blood pressure has come down modestly to 192/85.  Repeat blood pressure shows that it is most recently 224/160.  Troponin is elevated.  Started on nicardipine drip for hypertensive emergency.  Beta hydroxybutyrate is elevated to 1.6 and in the setting of hyperglycemia with a pH of 7.28, this is representative of DKA.  CT of the abdomen is negative for surgical pathology. Extensive calcifications and anasarca. May have component of chronic mesenteric ischemia causing abdominal pain although chart review shows DKA and volume overload are more likely etiology.     Risk  Prescription drug management.                                      Clinical Impression:  Final diagnoses:  [R10.9] Abdominal pain                 Korey Morales MD  08/29/24 1200

## 2024-08-29 NOTE — PLAN OF CARE
Problem: Violence Risk or Actual  Goal: Anger and Impulse Control  Outcome: Progressing     Problem: Diabetic Ketoacidosis  Goal: Optimal Coping  Outcome: Progressing  Goal: Fluid and Electrolyte Balance with Absence of Ketosis  Outcome: Progressing     Problem: Adult Inpatient Plan of Care  Goal: Plan of Care Review  Outcome: Progressing  Goal: Patient-Specific Goal (Individualized)  Outcome: Progressing  Goal: Absence of Hospital-Acquired Illness or Injury  Outcome: Progressing  Goal: Optimal Comfort and Wellbeing  Outcome: Progressing

## 2024-08-29 NOTE — NURSING
Nurses Note -- 4 Eyes      8/28/2024   11:19 PM      Skin assessed during: Admit      [x] No Altered Skin Integrity Present    []Prevention Measures Documented      [] Yes- Altered Skin Integrity Present or Discovered   [] LDA Added if Not in Epic (Describe Wound)   [] New Altered Skin Integrity was Present on Admit and Documented in LDA   [] Wound Image Taken    Wound Care Consulted? No    Attending Nurse:  Ro Woo RN/Staff Member:   Y[place]

## 2024-08-29 NOTE — H&P
[facility] Medicine  History & Physical    Patient Name: Tabby Howard  MRN: [id number]  Patient Class: OP- Observation  Admission Date: 8/28/2024  Attending Physician: Adam Hubbard MD  Primary Care Provider: Melony Kim NP         Patient information was obtained from patient and ER records.     Subjective:     Principal Problem:DKA (diabetic ketoacidosis)    Chief Complaint:   Chief Complaint   Patient presents with    Abdominal Pain     Pt complains of severe lower quad ABD pain accompanied by bilateral kidney pain. Pt was seen at [facility] and was told she needed dialysis. Pt was unable to receive dialysis due to pain and was sent to ED for further eval.        HPI: Tabby Howard is a 34 y.o. female with PMH as below who presents to the [facility] emergency department for evaluation of acute on chronic generalized abdominal pain, worse in the left flank, identical to prior episodes diagnosed as gastroparesis. Patient has ESRD and is on HD TThSa but missed last session due to transportation difficulties.        She has a PMH of DM on insulin , and ESRD on HD and HTN.      She was here this month for DKA and sent home but said she never really felt better and ended up being sent back here for DKA again and was just discharged few days ago for that.  That was 8/26 8/26 DC Summary    Hospital Course:   34F p/w 10/10 abdominal pain, located diffusely throughout, with associated vomiting. Patient reports that this level of pain is not typical for her gastroparesis. She reports she is unable to manage this discomfort at home. She presented for this discomfort to the [facility] ED on 8/22/24 PM and was discharged early 8/23/24 AM; she reported that she never truly felt better, but she did go to HD on 8/24/24, and then decided to seek medical attention at the [facility] ED thereafter. She does not believe that she had additional fluid removed during the 8/24/24 HD session.   Patient  admitted with DKA. Off pathway. Transitioned to SSI. Advanced to clear liquid diet.. SSI started. Resume lantus if tolerating oral diet. Very brittle type 1 diabetic. Dropped glucose on insulin drip to 50-60s. Infusion shut off and transferred to med surg floor.  In the med surg her sugars and other labs were well controlled.  Just feels mild low back pain but other than that feels back to her baseline.  Able to be discharged home and per pharmacy check it appears she has not had any mealtime insulin feel even though she says she takes it every day in addition to her Lantus.  I will discharge her home with a refill for her mealtime insulin as her sugars are well controlled here on her normal outpatient regimen, she just needs to take it consistently and he consistent meals.           She tells me that she never checked her sugars since going home from here on the 26th because her glucose monitor , dexcom I think , was not working or she needed a piece to it she said.   So because of that she was NOT taking any insulin she said since being home and was not eating.  Said she didn't want to eat     Then started getting nausea vomiting and abdominal pains again . Same as before.     So she went to [facility].   They said its gastroparesis and sent her home    Her labs there did show hyperglycemia and Metabolic acidosis with Anion gap . However no ketones checked.  No BHB checked so I can't say if she was in DKA then or not      But she came here next to our ER      DKA in our ER was identified quickly and Insulin drip started and IVFs    Her BP was very high also so they did Cardene drip        Plan was to admit to ICU For DKA    Past Medical History:   Diagnosis Date    ESRD on hemodialysis 04/12/2022    Gastritis 12/2022    EGD was 12/5/22    Gastroparesis 04/12/2022    has not had Emptying study    Heart failure with preserved ejection fraction 04/12/2022    EF 55% on 3/22    History of supraventricular tachycardia      Hyperkalemia 2022    Hypertensive emergency 2022    Sickle cell trait 2022    Type 2 diabetes mellitus        Past Surgical History:   Procedure Laterality Date     SECTION      x 3    COLONOSCOPY      COLONOSCOPY N/A 2022    Procedure: COLONOSCOPY;  Surgeon: Jagdeep Cedeno MD;  Location: [facility];  Service: Endoscopy;  Laterality: N/A;    DESTRUCTION, CILIARY BODY, USING LASER Left 2024    Procedure: Cyclophotocoagulation G-probe, retrobulbar chlorpromazine left eye;  Surgeon: Fidel Wise MD;  Location: [address];  Service: Ophthalmology;  Laterality: Left;    ENDOSCOPIC ULTRASOUND OF UPPER GASTROINTESTINAL TRACT N/A 2023    Procedure: ULTRASOUND, UPPER GI TRACT, ENDOSCOPIC;  Surgeon: Micky Paredes III, MD;  Location: [facility];  Service: Endoscopy;  Laterality: N/A;    ESOPHAGOGASTRODUODENOSCOPY N/A 10/18/2019    Procedure: ESOPHAGOGASTRODUODENOSCOPY (EGD);  Surgeon: Gianluca Mendez MD;  Location: [facility];  Service: Endoscopy;  Laterality: N/A;    ESOPHAGOGASTRODUODENOSCOPY N/A 2022    Procedure: EGD (ESOPHAGOGASTRODUODENOSCOPY);  Surgeon: Micky Paredes III, MD;  Location: [facility];  Service: Endoscopy;  Laterality: N/A;    ESOPHAGOGASTRODUODENOSCOPY N/A 2022    Procedure: EGD (ESOPHAGOGASTRODUODENOSCOPY);  Surgeon: Marcelo Zhong MD;  Location: [facility];  Service: Endoscopy;  Laterality: N/A;    EYE SURGERY      INSERTION, CATHETER, TUNNELED N/A 2023    Procedure: Insertion,catheter,tunneled;  Surgeon: Carlos Thurman Jr., MD;  Location: [facility];  Service: General;  Laterality: N/A;    LAPAROSCOPIC CHOLECYSTECTOMY N/A 2022    Procedure: CHOLECYSTECTOMY, LAPAROSCOPIC;  Surgeon: Grey Perez MD;  Location: [facility];  Service: General;  Laterality: N/A;    PLACEMENT OF DUAL-LUMEN VASCULAR CATHETER Left 2022    Procedure: INSERTION-CATHETER-JOSEPH;  Surgeon: Dionte Gan MD;  Location: [facility] OR;   "Service: General;  Laterality: Left;    PLACEMENT OF DUAL-LUMEN VASCULAR CATHETER Right 07/26/2022    Procedure: INSERTION-CATHETER-Hemosplit;  Surgeon: Dionte Gan MD;  Location: [facility];  Service: General;  Laterality: Right;       Review of patient's allergies indicates:   Allergen Reactions    Droperidol Hives    Haldol [haloperidol lactate] Hives    Penicillins Hives    Droperidol (bulk) Anxiety     STATES "FREAKS OUT".  TOLERATES HALDOL PRIOR TO ARRIVAL AT ANOTHER FACILITY TODAY.        Current Facility-Administered Medications on File Prior to Encounter   Medication    [COMPLETED] haloperidol lactate injection 2 mg    [COMPLETED] HYDROmorphone injection 0.5 mg    [COMPLETED] HYDROmorphone injection 1 mg    [COMPLETED] insulin regular injection 6 Units 0.06 mL     Current Outpatient Medications on File Prior to Encounter   Medication Sig    acetaZOLAMIDE (DIAMOX) 250 MG tablet take 1 tablet by mouth 3 times a day    albuterol (VENTOLIN HFA) 90 mcg/actuation inhaler Inhale 2 puffs every 4 hours by inhalation route.    apixaban (ELIQUIS) 5 mg Tab Take 1 tablet (5 mg total) by mouth 2 (two) times daily. Patient w/ thrombocytopenia <50, so held during admission, follow-up with hematologist about restarting    atropine 1% (ISOPTO ATROPINE) 1 % Drop Place 1 drop into the left eye 2 (two) times a day.    blood sugar diagnostic (TRUE METRIX GLUCOSE TEST STRIP) Strp Use 1 strip to check blood glucose three times daily    blood-glucose meter (TRUE METRIX GLUCOSE METER) Misc Use to check blood glucose    blood-glucose meter,continuous (DEXCOM ) Misc USE WITH SENSORS    blood-glucose sensor (DEXCOM G7 SENSOR) Heather Use as directed for CGM. Change sensor every 10 days    blood-glucose sensor Heather CHANGE EVERY 10 DAYS    brimonidine 0.15 % OPTH DROP (ALPHAGAN) 0.15 % ophthalmic solution Place 1 drop into both eyes 3 (three) times daily.    brinzolamide (AZOPT) 1 % ophthalmic suspension Place1 drop in left eye 3 " "times a day for 30 days    busPIRone (BUSPAR) 10 MG tablet Take 1 tablet (10 mg total) by mouth 3 (three) times daily as needed (anxiety).    cetirizine (ZYRTEC) 10 MG tablet Take 1 tablet every day by oral route.    dorzolamide-timolol 2-0.5% (COSOPT) 22.3-6.8 mg/mL ophthalmic solution place 1 drop in left eye twice a day  for 30 days    FLUoxetine 40 MG capsule Take 1 capsule every day by oral route.    fluticasone propionate (FLONASE ALLERGY RELIEF) 50 mcg/actuation nasal spray [place] 1 spray every day by intranasal route.    gabapentin (NEURONTIN) 300 MG capsule Take 2 capsules twice a day by oral route for 90 days.    hydrALAZINE (APRESOLINE) 25 MG tablet take 1 tablet by mouth every 8 hours    insulin aspart U-100 (NOVOLOG) 100 unit/mL (3 mL) InPn pen Inject 8 Units into the skin 3 (three) times daily with meals.    insulin glargine U-100, Lantus, (LANTUS SOLOSTAR U-100 INSULIN) 100 unit/mL (3 mL) InPn pen Inject 22 units every day by subcutaneous route in the evening for 30 days, for diabetes.    lancets (TRUEPLUS LANCETS) 33 gauge Misc Use 1 to check blood glucose three times daily    lancets 33 gauge Misc Use to test twice daily    levETIRAcetam (KEPPRA) 500 MG Tab Take 1 tablet (500 mg total) by mouth 2 (two) times daily.    LORazepam (ATIVAN) 0.5 MG tablet Take 1 tablet 3 times a day by oral route for 7 days, for severe panic.    ondansetron (ZOFRAN-ODT) 4 MG TbDL Place 1 tablet (4 mg) on or under the tongue every 8 hours for 10 days.    oxyCODONE-acetaminophen (PERCOCET)  mg per tablet Take 1 tablet by mouth every 4 (four) hours as needed for Pain.    pantoprazole (PROTONIX) 40 MG tablet Take 1 tablet (40 mg total) by mouth once daily.    pen needle, diabetic 31 gauge x 3/16" Ndle Use as directed with insulin once daily    prednisoLONE acetate (PRED FORTE) 1 % DrpS Place 1 drop into the left eye 2 (two) times daily.    promethazine (PHENERGAN) 25 MG tablet Take 1 tablet (25 mg total) by mouth every " 6 (six) hours as needed.    sevelamer carbonate (RENVELA) 800 mg Tab Take 1 tablet (800 mg total) by mouth 3 (three) times daily with meals.    sucralfate (CARAFATE) 1 gram tablet Take 1 tablet (1 g total) by mouth 4 (four) times daily.    timolol maleate 0.5% (TIMOPTIC) 0.5 % Drop Place 1 drop in left eye 2 times a day for 30 days    [DISCONTINUED] atenoloL (TENORMIN) 50 MG tablet Take 1 tablet (50 mg total) by mouth every other day.    [DISCONTINUED] insulin detemir U-100, Levemir, (LEVEMIR FLEXTOUCH U100 INSULIN) 100 unit/mL (3 mL) InPn pen Inject 22 units every day by subcutaneous route in the evening for 90 days.    [DISCONTINUED] omeprazole (PRILOSEC) 20 MG capsule Take 2 capsules (40 mg total) by mouth once daily. for 10 days     Family History       Problem Relation (Age of Onset)    Diabetes Mother, Father          Tobacco Use    Smoking status: Never    Smokeless tobacco: Never   Substance and Sexual Activity    Alcohol use: Not Currently    Drug use: No    Sexual activity: Not Currently     Partners: Male     Birth control/protection: I.U.D.     Review of Systems   All other systems reviewed and are negative.    Objective:     Vital Signs (Most Recent):  Temp: 98 °F (36.7 °C) (08/29/24 0305)  Pulse: 81 (08/29/24 0300)  Resp: (!) 21 (08/29/24 0412)  BP: (!) 124/53 (08/29/24 0300)  SpO2: (!) 91 % (08/29/24 0300) Vital Signs (24h Range):  Temp:  [97.5 °F (36.4 °C)-98.5 °F (36.9 °C)] 98 °F (36.7 °C)  Pulse:  [] 81  Resp:  [12-27] 21  SpO2:  [91 %-100 %] 91 %  BP: (107-253)/() 124/53     Weight: 53.1 kg (117 lb)  Body mass index is 21.4 kg/m².     Physical Exam  Vitals and nursing note reviewed.   Constitutional:       Appearance: Normal appearance.   HENT:      Head: Normocephalic.      Nose: Nose normal.      Mouth/Throat:      Mouth: Mucous membranes are moist.   Eyes:      Pupils: Pupils are equal, round, and reactive to light.   Cardiovascular:      Rate and Rhythm: Normal rate and regular  "rhythm.      Pulses: Normal pulses.      Heart sounds: Normal heart sounds.   Pulmonary:      Effort: Pulmonary effort is normal.      Breath sounds: Normal breath sounds.   Abdominal:      General: Abdomen is flat. Bowel sounds are normal.      Palpations: Abdomen is soft.      Tenderness: There is abdominal tenderness.      Comments: More right side and right flank tenderness    Musculoskeletal:         General: Normal range of motion.      Cervical back: Normal range of motion.   Skin:     General: Skin is warm.      Capillary Refill: Capillary refill takes less than 2 seconds.   Neurological:      General: No focal deficit present.      Mental Status: She is alert and oriented to person, place, and time.   Psychiatric:         Mood and Affect: Mood normal.         Behavior: Behavior normal.              CRANIAL NERVES     CN III, IV, VI   Pupils are equal, round, and reactive to light.       Significant Labs: All pertinent labs within the past 24 hours have been reviewed.  CBC:   Recent Labs   Lab 08/28/24  0750 08/28/24 1742 08/28/24  1933   WBC 8.08 8.74  --    HGB 9.1* 9.8*  --    HCT 26.9* 28.9* 53   * 119*  --      CMP:   Recent Labs   Lab 08/28/24  0750 08/28/24  1120 08/28/24 1742 08/28/24  2355    139 135* 138   K 4.3 4.4 5.1 4.1   CL 97 101 95 101   CO2 19* 21* 20* 21*   * 352* 396* 354*   BUN 54* 57* 56* 59*   CREATININE 9.5* 9.8* 8.7* 9.3*   CALCIUM 9.6 9.2 9.5 8.6*   PROT 6.8  --  7.6  --    ALBUMIN 3.3*  --  4.2  --    BILITOT 1.1*  --  1.2*  --    ALKPHOS 90  --  99  --    AST 12  --  24  --    ALT 17  --  16  --    ANIONGAP 22* 17* 20* 16     Lactic Acid:   Recent Labs   Lab 08/28/24  1922   LACTATE 0.6     Lipase:   Recent Labs   Lab 08/28/24  0750 08/28/24 1742   LIPASE 27 17     Lipid Panel: No results for input(s): "CHOL", "HDL", "LDLCALC", "TRIG", "CHOLHDL" in the last 48 hours.  Magnesium:   Recent Labs   Lab 08/28/24  1742   MG 2.4     Troponin:   Recent Labs   Lab " "08/28/24  1742   TROPONINIHS 117.5*     TSH:   Recent Labs   Lab 07/16/24  1610   TSH 1.355     Urine Studies: No results for input(s): "COLORU", "APPEARANCEUA", "PHUR", "SPECGRAV", "PROTEINUA", "GLUCUA", "KETONESU", "BILIRUBINUA", "OCCULTUA", "NITRITE", "UROBILINOGEN", "LEUKOCYTESUR", "RBCUA", "WBCUA", "BACTERIA", "SQUAMEPITHEL", "HYALINECASTS" in the last 48 hours.    Invalid input(s): "WRIGHTSUR"    Significant Imaging: I have reviewed all pertinent imaging results/findings within the past 24 hours.  I have reviewed and interpreted all pertinent imaging results/findings within the past 24 hours.  Assessment/Plan:     * DKA (diabetic ketoacidosis)  She is TYPE II    some notes say she is TYPE I       She was not checking her sugar since being home 8/26 after discharge because she needed some part to her glucose monitor device    And she was not taking any insulin since then either       Her typical DKA symptoms returned on day of admission and went to [place] [facility] , they sent her home and she came here       Insulin drip started in ER with IVFs    PLAN    - DC Insulin drip now   since sugars are down     Keep in mind with ESRD insulin can accumulate and last longer     - DC IVFs      - start diabetic diet   as tolerated       - SQ insulin ACHS started moderate dose SSI      - 10 units Levemir to start Qam  SQ        - I ordered a few IV Dilaudid 0.5 mg doses for her abdominal pain .   But it should be getting better now       - pepcid IV BID    - Reglan 5 mg IV  Q6           We will need to make sure she has all her equipment at home and verify its all working before we DC  her home this time     Uncontrolled hypertension  They had her on cardene drip which we continued in ICU     But her BP is fine now so I am stopping the cardene drip     We can resume her home BP HTN meds     Control her pain also     DC IVFs    ESRD (end stage renal disease)  ESRD on HD    Missed a HD session she said     No urgent reason to " zulaye tonight       I consulted nephrology for morning and let them know she's here       VTE Risk Mitigation (From admission, onward)           Ordered     IP VTE HIGH RISK PATIENT  Once         08/28/24 2123     Place sequential compression device  Until discontinued         08/28/24 2123     Reason for No Pharmacological VTE Prophylaxis  Once        Question:  Reasons:  Answer:  Patient is Ambulatory    08/28/24 2123     Place JOCELYNE hose  Until discontinued         08/28/24 2121     Place sequential compression device  Until discontinued         08/28/24 2121     Place sequential compression device  Until discontinued         08/28/24 2059                  Critical care time spent on the evaluation and treatment of severe organ dysfunction, review of pertinent labs and imaging studies, discussions with consulting providers and discussions with patient/family: 65 minutes.     On 08/29/2024, patient should be placed in hospital observation services under my care.        Pharmacist Renal Dose Adjustment Note    Tabby Howard is a 34 y.o. female being treated with the medication famotidine.    Patient Data:    Vital Signs (Most Recent):  Temp: 98.4 °F (36.9 °C) (08/28/24 1521)  Pulse: 91 (08/28/24 2115)  Resp: 18 (08/28/24 2040)  BP: (!) 224/160 (08/28/24 2112)  SpO2: 100 % (08/28/24 2116) Vital Signs (72h Range):  Temp:  [98.4 °F (36.9 °C)-98.5 °F (36.9 °C)]   Pulse:  []   Resp:  [14-27]   BP: (130-253)/()   SpO2:  [92 %-100 %]      Recent Labs   Lab 08/28/24  0750 08/28/24  1120 08/28/24  1742   CREATININE 9.5* 9.8* 8.7*     Serum creatinine: 8.7 mg/dL (H) 08/28/24 1742  Estimated creatinine clearance: 7.1 mL/min (A)    Famotidine 20 mg q12H will be changed to famotidine 20 mg q24H    Pharmacist's Name: Nick Gastelum  Pharmacist's Extension: [phone]      Adam Hubbard MD  Department of Hospital Medicine  [facility]

## 2024-08-29 NOTE — CONSULTS
INPATIENT NEPHROLOGY CONSULT   [facility]    Patient Name: Tabby Howard  Date: 08/29/2024    Reason for consultation: ESRD    Chief Complaint:   Chief Complaint   Patient presents with    Abdominal Pain     Pt complains of severe lower quad ABD pain accompanied by bilateral kidney pain. Pt was seen at [place] [facility] and was told she needed dialysis. Pt was unable to receive dialysis due to pain and was sent to ED for further eval.       History of Present Illness:  33 y/o F with ESRD on HD TTS, type I DM on insulin, and HTN who p/w DKA, consulted for dialysis.  Reports abd pain, inability tolerate PO intake, unable to attend HD as a result. Wasn't taking insulin or eating at home. Went to another hospital yest- told it was gastroparesis flare and sent home. She went to HD but was so miserable they sent her directly to [facility]. She is on the DKA pathway. Started on cardene gtt for high BP as well.    Interval History:  8/29- BP better, on 2L NC, off insulin and cardene gtts, still with abd pain, doesn't have much fluid on her, not eating     Plan of Care:    Assessment:  DKA   ESRD on HD TTS  HTN urgency  SHPT  Anemia of CKD    Plan:    - DKA resolved  - ordered HD TTS  - off cardene gtt- resume home BP meds- ordered 1-3L UF  - renal diet, 1.5L fluid restriction  - resume binder with meals  - ordered [facility] with HD    Thank you for allowing us to participate in this patient's care. We will continue to follow.    Vital Signs:  Temp Readings from Last 3 Encounters:   08/29/24 98 °F (36.7 °C) (Oral)   08/28/24 98.4 °F (36.9 °C) (Oral)   08/26/24 97.7 °F (36.5 °C) (Oral)       Pulse Readings from Last 3 Encounters:   08/29/24 82   08/28/24 97   08/26/24 82       BP Readings from Last 3 Encounters:   08/29/24 (!) 108/53   08/28/24 (!) 167/69   08/26/24 (!) 158/90       Weight:  Wt Readings from Last 3 Encounters:   08/28/24 53.1 kg (117 lb)   08/28/24 53.1 kg (117 lb)   08/25/24 51 kg (112 lb 7 oz)       Past  Medical & Surgical History:  Past Medical History:   Diagnosis Date    ESRD on hemodialysis 2022    Gastritis 2022    EGD was 21    Gastroparesis 2022    has not had Emptying study    Heart failure with preserved ejection fraction 2022    EF 55% on 3/22    History of supraventricular tachycardia     Hyperkalemia 2022    Hypertensive emergency 2022    Sickle cell trait 2022    Type 2 diabetes mellitus        Past Surgical History:   Procedure Laterality Date     SECTION      x 3    COLONOSCOPY      COLONOSCOPY N/A 2022    Procedure: COLONOSCOPY;  Surgeon: Jagdeep Cedeno MD;  Location: [facility];  Service: Endoscopy;  Laterality: N/A;    DESTRUCTION, CILIARY BODY, USING LASER Left 2024    Procedure: Cyclophotocoagulation G-probe, retrobulbar chlorpromazine left eye;  Surgeon: Fidel Wise MD;  Location: [address];  Service: Ophthalmology;  Laterality: Left;    ENDOSCOPIC ULTRASOUND OF UPPER GASTROINTESTINAL TRACT N/A 2023    Procedure: ULTRASOUND, UPPER GI TRACT, ENDOSCOPIC;  Surgeon: Micky Paredes III, MD;  Location: [facility];  Service: Endoscopy;  Laterality: N/A;    ESOPHAGOGASTRODUODENOSCOPY N/A 10/18/2019    Procedure: ESOPHAGOGASTRODUODENOSCOPY (EGD);  Surgeon: Gianluca Mendez MD;  Location: [facility];  Service: Endoscopy;  Laterality: N/A;    ESOPHAGOGASTRODUODENOSCOPY N/A 2022    Procedure: EGD (ESOPHAGOGASTRODUODENOSCOPY);  Surgeon: Micky Paredes III, MD;  Location: [facility];  Service: Endoscopy;  Laterality: N/A;    ESOPHAGOGASTRODUODENOSCOPY N/A 2022    Procedure: EGD (ESOPHAGOGASTRODUODENOSCOPY);  Surgeon: Marcelo Zhong MD;  Location: [facility];  Service: Endoscopy;  Laterality: N/A;    EYE SURGERY      INSERTION, CATHETER, TUNNELED N/A 2023    Procedure: Insertion,catheter,tunneled;  Surgeon: Carlos Thurman Jr., MD;  Location: [facility];  Service: General;  Laterality: N/A;     LAPAROSCOPIC CHOLECYSTECTOMY N/A 07/30/2022    Procedure: CHOLECYSTECTOMY, LAPAROSCOPIC;  Surgeon: Grey Perez MD;  Location: [facility];  Service: General;  Laterality: N/A;    PLACEMENT OF DUAL-LUMEN VASCULAR CATHETER Left 07/12/2022    Procedure: INSERTION-CATHETER-JOSEPH;  Surgeon: Dionte Gan MD;  Location: [facility] OR;  Service: General;  Laterality: Left;    PLACEMENT OF DUAL-LUMEN VASCULAR CATHETER Right 07/26/2022    Procedure: INSERTION-CATHETER-Hemosplit;  Surgeon: Dionte Gan MD;  Location: [facility] OR;  Service: General;  Laterality: Right;       Past Social History:  Social History     Socioeconomic History    Marital status:    Tobacco Use    Smoking status: Never    Smokeless tobacco: Never   Substance and Sexual Activity    Alcohol use: Not Currently    Drug use: No    Sexual activity: Not Currently     Partners: Male     Birth control/protection: I.U.D.     Social Determinants of Health     Financial Resource Strain: Medium Risk (8/25/2024)    Overall Financial Resource Strain (CARDIA)     Difficulty of Paying Living Expenses: Somewhat hard   Food Insecurity: Food Insecurity Present (8/25/2024)    Hunger Vital Sign     Worried About Running Out of Food in the Last Year: Often true     Ran Out of Food in the Last Year: Often true   Transportation Needs: No Transportation Needs (8/25/2024)    TRANSPORTATION NEEDS     Transportation : No   Physical Activity: Inactive (8/25/2024)    Exercise Vital Sign     Days of Exercise per Week: 0 days     Minutes of Exercise per Session: 0 min   Stress: Stress Concern Present (8/25/2024)    [employer] [place] [employer] - Occupational Stress Questionnaire     Feeling of Stress : Rather much   Housing Stability: High Risk (8/25/2024)    Housing Stability Vital Sign     Unable to Pay for Housing in the Last Year: Yes     Homeless in the Last Year: No       Medications:  Scheduled Meds:   apixaban  5 mg Oral BID    busPIRone  10 mg Oral BID     droPERidol  1.25 mg Intravenous Once    famotidine (PF)  20 mg Intravenous Daily    FLUoxetine  40 mg Oral Daily    gabapentin  300 mg Oral BID    hydrALAZINE  50 mg Oral Q8H    insulin glargine U-100  10 Units Subcutaneous Daily    levETIRAcetam  500 mg Oral BID    magnesium sulfate in water        metoclopramide  5 mg Intravenous Q6H    senna-docusate 8.6-50 mg  1 tablet Oral BID     Continuous Infusions:   D5 and 0.45% NaCl  125 mL/hr Intravenous Continuous PRN   Stopped at 08/29/24 0359     PRN Meds:.  Current Facility-Administered Medications:     acetaminophen, 650 mg, Oral, Q4H PRN    acetaminophen, 650 mg, Oral, Q4H PRN    aluminum-magnesium hydroxide-simethicone, 30 mL, Oral, QID PRN    D5 and 0.45% NaCl, 125 mL/hr, Intravenous, Continuous PRN    dextrose 50%, 12.5 g, Intravenous, PRN    dextrose 50%, 25 g, Intravenous, PRN    glucagon (human recombinant), 1 mg, Intramuscular, PRN    glucose, 16 g, Oral, PRN    glucose, 24 g, Oral, PRN    HYDROmorphone, 0.5 mg, Intravenous, Q1H PRN    insulin aspart U-100, 0-10 Units, Subcutaneous, QID (AC + HS) PRN    magnesium oxide, 800 mg, Oral, PRN    magnesium oxide, 800 mg, Oral, PRN    magnesium sulfate in water, , ,     melatonin, 6 mg, Oral, Nightly PRN    naloxone, 0.02 mg, Intravenous, PRN    ondansetron, 4 mg, Intravenous, Q6H PRN    ondansetron, 4 mg, Intravenous, Q6H PRN    potassium bicarbonate, 35 mEq, Oral, PRN    potassium bicarbonate, 50 mEq, Oral, PRN    potassium bicarbonate, 60 mEq, Oral, PRN    potassium, sodium phosphates, 2 packet, Oral, PRN    potassium, sodium phosphates, 2 packet, Oral, PRN    potassium, sodium phosphates, 2 packet, Oral, PRN    sodium chloride 0.9%, 10 mL, Intravenous, PRN    sodium chloride 0.9%, 10 mL, Intravenous, Q12H PRN  Current Facility-Administered Medications on File Prior to Encounter   Medication Dose Route Frequency Provider Last Rate Last Admin    [COMPLETED] HYDROmorphone injection 0.5 mg  0.5 mg Intravenous ED 1  Time Akin Flores MD   0.5 mg at 08/28/24 0937    [COMPLETED] insulin regular injection 6 Units 0.06 mL  6 Units Intravenous ED 1 Time Akin Flores MD   6 Units at 08/28/24 0926     Current Outpatient Medications on File Prior to Encounter   Medication Sig Dispense Refill    acetaZOLAMIDE (DIAMOX) 250 MG tablet take 1 tablet by mouth 3 times a day 90 tablet 3    albuterol (VENTOLIN HFA) 90 mcg/actuation inhaler Inhale 2 puffs every 4 hours by inhalation route. 8 g 2    apixaban (ELIQUIS) 5 mg Tab Take 1 tablet (5 mg total) by mouth 2 (two) times daily. Patient w/ thrombocytopenia <50, so held during admission, follow-up with hematologist about restarting 180 tablet 1    atropine 1% (ISOPTO ATROPINE) 1 % Drop Place 1 drop into the left eye 2 (two) times a day. 15 mL 3    blood sugar diagnostic (TRUE METRIX GLUCOSE TEST STRIP) Strp Use 1 strip to check blood glucose three times daily 100 each 11    blood-glucose meter (TRUE METRIX GLUCOSE METER) Misc Use to check blood glucose 1 each 0    blood-glucose meter,continuous (DEXCOM ) Misc USE WITH SENSORS 1 each 0    blood-glucose sensor (DEXCOM G7 SENSOR) Heather Use as directed for CGM. Change sensor every 10 days 1 each 11    blood-glucose sensor Heather CHANGE EVERY 10 DAYS 3 each 0    brimonidine 0.15 % OPTH DROP (ALPHAGAN) 0.15 % ophthalmic solution Place 1 drop into both eyes 3 (three) times daily. 15 mL 3    brinzolamide (AZOPT) 1 % ophthalmic suspension Place1 drop in left eye 3 times a day for 30 days 15 mL 6    busPIRone (BUSPAR) 10 MG tablet Take 1 tablet (10 mg total) by mouth 3 (three) times daily as needed (anxiety). 60 tablet 5    cetirizine (ZYRTEC) 10 MG tablet Take 1 tablet every day by oral route. 30 tablet 0    dorzolamide-timolol 2-0.5% (COSOPT) 22.3-6.8 mg/mL ophthalmic solution place 1 drop in left eye twice a day  for 30 days 10 mL 0    FLUoxetine 40 MG capsule Take 1 capsule every day by oral route. 30 capsule 2    fluticasone  "propionate (FLONASE ALLERGY RELIEF) 50 mcg/actuation nasal spray [place] 1 spray every day by intranasal route. 16 g 2    gabapentin (NEURONTIN) 300 MG capsule Take 2 capsules twice a day by oral route for 90 days. 360 capsule 1    hydrALAZINE (APRESOLINE) 25 MG tablet take 1 tablet by mouth every 8 hours 90 tablet 0    insulin aspart U-100 (NOVOLOG) 100 unit/mL (3 mL) InPn pen Inject 8 Units into the skin 3 (three) times daily with meals. 15 mL 0    insulin glargine U-100, Lantus, (LANTUS SOLOSTAR U-100 INSULIN) 100 unit/mL (3 mL) InPn pen Inject 22 units every day by subcutaneous route in the evening for 30 days, for diabetes. 15 mL 2    lancets (TRUEPLUS LANCETS) 33 gauge Misc Use 1 to check blood glucose three times daily 100 each 11    lancets 33 gauge Misc Use to test twice daily 100 each 5    levETIRAcetam (KEPPRA) 500 MG Tab Take 1 tablet (500 mg total) by mouth 2 (two) times daily. 60 tablet 11    LORazepam (ATIVAN) 0.5 MG tablet Take 1 tablet 3 times a day by oral route for 7 days, for severe panic. 21 tablet 2    ondansetron (ZOFRAN-ODT) 4 MG TbDL Place 1 tablet (4 mg) on or under the tongue every 8 hours for 10 days. 24 tablet 2    oxyCODONE-acetaminophen (PERCOCET)  mg per tablet Take 1 tablet by mouth every 4 (four) hours as needed for Pain. 42 tablet 0    pantoprazole (PROTONIX) 40 MG tablet Take 1 tablet (40 mg total) by mouth once daily. 30 tablet 0    pen needle, diabetic 31 gauge x 3/16" Ndle Use as directed with insulin once daily 100 each 0    prednisoLONE acetate (PRED FORTE) 1 % DrpS Place 1 drop into the left eye 2 (two) times daily. 5 mL 3    promethazine (PHENERGAN) 25 MG tablet Take 1 tablet (25 mg total) by mouth every 6 (six) hours as needed. 15 tablet 0    sevelamer carbonate (RENVELA) 800 mg Tab Take 1 tablet (800 mg total) by mouth 3 (three) times daily with meals. 180 tablet 11    sucralfate (CARAFATE) 1 gram tablet Take 1 tablet (1 g total) by mouth 4 (four) times daily. 100 " tablet 1    timolol maleate 0.5% (TIMOPTIC) 0.5 % Drop Place 1 drop in left eye 2 times a day for 30 days 5 mL 6    [DISCONTINUED] atenoloL (TENORMIN) 50 MG tablet Take 1 tablet (50 mg total) by mouth every other day. 45 tablet 3    [DISCONTINUED] insulin detemir U-100, Levemir, (LEVEMIR FLEXTOUCH U100 INSULIN) 100 unit/mL (3 mL) InPn pen Inject 22 units every day by subcutaneous route in the evening for 90 days. 18 mL 1    [DISCONTINUED] omeprazole (PRILOSEC) 20 MG capsule Take 2 capsules (40 mg total) by mouth once daily. for 10 days 20 capsule 0       Allergies:  Droperidol, Haldol [haloperidol lactate], Penicillins, and Droperidol (bulk)    Past Family History:  Reviewed; refer to Hospitalist Admission Note    Review of Systems:  Review of Systems - All 14 systems reviewed and negative, except as noted in HPI    Physical Exam:  General Appearance:    NAD, AAO x 3, cooperative, appears stated age   Head:    Normocephalic, atraumatic   Eyes:    PER, EOMI, and conjunctiva/sclera clear bilaterally       Mouth:   Moist mucus membranes, no thrush or oral lesions,       normal dentition   Back:     No CVA tenderness   Lungs:     Clear to auscultation bilaterally, no wheezes, crackles,           rales or rhonchi, symmetric air movement, respirations unlabored   Chest wall:    No tenderness or deformity   Heart:    Regular rate and rhythm, S1 and S2 normal, no murmur, rub   or gallop   Abdomen:     Soft, non-tender, non-distended, bowel sounds active all four   quadrants, no RT or guarding, no masses, no organomegaly   Extremities:   Warm and well perfused, distal pulses are intact, no             cyanosis or peripheral edema   MSK:   No joint or muscle swelling, tenderness or deformity   Skin:   Skin color, texture, turgor normal, no rashes or lesions   Neurologic/Psychiatric:   CNII-XII intact, normal strength and sensation       throughout, no asterixis; normal affect, memory, judgement     and insight       Results:  Lab Results   Component Value Date     08/29/2024    K 5.1 08/29/2024     08/29/2024    CO2 24 08/29/2024    BUN 61 (H) 08/29/2024    CREATININE 9.5 (H) 08/29/2024    CALCIUM 8.7 08/29/2024    ANIONGAP 14 08/29/2024    ESTGFRAFRICA 19 (L) 09/03/2022    EGFRNONAA 18 (A) 07/31/2022       Lab Results   Component Value Date    CALCIUM 8.7 08/29/2024    PHOS 6.8 (H) 08/29/2024       Recent Labs   Lab 08/29/24  0414   WBC 8.00   RBC 3.19*   HGB 9.6*   HCT 28.7*      MCV 90   MCH 30.1   MCHC 33.4       I have personally reviewed pertinent radiological imaging and reports from this admission.    I have spent > 70 minutes providing care for this patient for the above diagnoses. These services have included chart/data/imaging review, evaluation, exam, formulation of plan, , note preparation, and discussions with staff involved in this patient's care.    Prateek Clark MD MPH  [facility]  [address] [place] [id number]  [phone] (p)  [phone] (f)

## 2024-08-29 NOTE — ASSESSMENT & PLAN NOTE
She is TYPE II    some notes say she is TYPE I       She was not checking her sugar since being home 8/26 after discharge because she needed some part to her glucose monitor device    And she was not taking any insulin since then either       Her typical DKA symptoms returned on day of admission and went to [place] [facility] , they sent her home and she came here       Insulin drip started in ER with IVFs    PLAN    - DC Insulin drip now   since sugars are down     Keep in mind with ESRD insulin can accumulate and last longer     - DC IVFs      - start diabetic diet   as tolerated       - SQ insulin ACHS started moderate dose SSI      - 10 units Levemir to start Qam  SQ        - I ordered a few IV Dilaudid 0.5 mg doses for her abdominal pain .   But it should be getting better now       - pepcid IV BID    - Reglan 5 mg IV  Q6           We will need to make sure she has all her equipment at home and verify its all working before we DC  her home this time

## 2024-08-29 NOTE — ASSESSMENT & PLAN NOTE
They had her on cardene drip which we continued in ICU     But her BP is fine now so I am stopping the cardene drip     We can resume her home BP HTN meds     Control her pain also     DC IVFs

## 2024-08-29 NOTE — PROGRESS NOTES
Pharmacist Renal Dose Adjustment Note    Tabby Howard is a 34 y.o. female being treated with the medication famotidine.    Patient Data:    Vital Signs (Most Recent):  Temp: 98.4 °F (36.9 °C) (08/28/24 1521)  Pulse: 91 (08/28/24 2115)  Resp: 18 (08/28/24 2040)  BP: (!) 224/160 (08/28/24 2112)  SpO2: 100 % (08/28/24 2116) Vital Signs (72h Range):  Temp:  [98.4 °F (36.9 °C)-98.5 °F (36.9 °C)]   Pulse:  []   Resp:  [14-27]   BP: (130-253)/()   SpO2:  [92 %-100 %]      Recent Labs   Lab 08/28/24  0750 08/28/24  1120 08/28/24  1742   CREATININE 9.5* 9.8* 8.7*     Serum creatinine: 8.7 mg/dL (H) 08/28/24 1742  Estimated creatinine clearance: 7.1 mL/min (A)    Famotidine 20 mg q12H will be changed to famotidine 20 mg q24H    Pharmacist's Name: Nick Gastelum  Pharmacist's Extension: [phone]

## 2024-08-29 NOTE — CARE UPDATE
08/29/24 0727   Patient Assessment/Suction   Level of Consciousness (AVPU) alert   Respiratory Effort Normal;Unlabored   Expansion/Accessory Muscles/Retractions no use of accessory muscles;no retractions;expansion symmetric   PRE-TX-O2   Device (Oxygen Therapy) nasal cannula   $ Is the patient on Low Flow Oxygen? Yes   Flow (L/min) (Oxygen Therapy) 4   SpO2 95 %   Pulse Oximetry Type Continuous   $ Pulse Oximetry - Multiple Charge Pulse Oximetry - Multiple   Pulse 84   Resp (!) 45

## 2024-08-29 NOTE — SUBJECTIVE & OBJECTIVE
Past Medical History:   Diagnosis Date    ESRD on hemodialysis 2022    Gastritis 2022    EGD was 22    Gastroparesis 2022    has not had Emptying study    Heart failure with preserved ejection fraction 2022    EF 55% on 3/22    History of supraventricular tachycardia     Hyperkalemia 2022    Hypertensive emergency 2022    Sickle cell trait 2022    Type 2 diabetes mellitus        Past Surgical History:   Procedure Laterality Date     SECTION      x 3    COLONOSCOPY      COLONOSCOPY N/A 2022    Procedure: COLONOSCOPY;  Surgeon: Jagdeep Cedeno MD;  Location: [facility];  Service: Endoscopy;  Laterality: N/A;    DESTRUCTION, CILIARY BODY, USING LASER Left 2024    Procedure: Cyclophotocoagulation G-probe, retrobulbar chlorpromazine left eye;  Surgeon: Fidel Wise MD;  Location: [address];  Service: Ophthalmology;  Laterality: Left;    ENDOSCOPIC ULTRASOUND OF UPPER GASTROINTESTINAL TRACT N/A 2023    Procedure: ULTRASOUND, UPPER GI TRACT, ENDOSCOPIC;  Surgeon: Micky Paredes III, MD;  Location: [facility];  Service: Endoscopy;  Laterality: N/A;    ESOPHAGOGASTRODUODENOSCOPY N/A 10/18/2019    Procedure: ESOPHAGOGASTRODUODENOSCOPY (EGD);  Surgeon: Gianluca Mendez MD;  Location: [facility];  Service: Endoscopy;  Laterality: N/A;    ESOPHAGOGASTRODUODENOSCOPY N/A 2022    Procedure: EGD (ESOPHAGOGASTRODUODENOSCOPY);  Surgeon: Micky Paredes III, MD;  Location: [facility];  Service: Endoscopy;  Laterality: N/A;    ESOPHAGOGASTRODUODENOSCOPY N/A 2022    Procedure: EGD (ESOPHAGOGASTRODUODENOSCOPY);  Surgeon: Marcelo Zhong MD;  Location: [facility] ENDO;  Service: Endoscopy;  Laterality: N/A;    EYE SURGERY      INSERTION, CATHETER, TUNNELED N/A 2023    Procedure: Insertion,catheter,tunneled;  Surgeon: Carlos Thurman Jr., MD;  [facility];  Service: General;  Laterality: N/A;    LAPAROSCOPIC CHOLECYSTECTOMY N/A  "07/30/2022    Procedure: CHOLECYSTECTOMY, LAPAROSCOPIC;  Surgeon: Grey Perez MD;  Location: [facility] OR;  Service: General;  Laterality: N/A;    PLACEMENT OF DUAL-LUMEN VASCULAR CATHETER Left 07/12/2022    Procedure: INSERTION-CATHETER-JOSEPH;  Surgeon: Dionte Gan MD;  Location: [facility] OR;  Service: General;  Laterality: Left;    PLACEMENT OF DUAL-LUMEN VASCULAR CATHETER Right 07/26/2022    Procedure: INSERTION-CATHETER-Hemosplit;  Surgeon: Dionte Gan MD;  Location: [facility] OR;  Service: General;  Laterality: Right;       Review of patient's allergies indicates:   Allergen Reactions    Droperidol Hives    Haldol [haloperidol lactate] Hives    Penicillins Hives    Droperidol (bulk) Anxiety     STATES "FREAKS OUT".  TOLERATES HALDOL PRIOR TO ARRIVAL AT ANOTHER FACILITY TODAY.        Current Facility-Administered Medications on File Prior to Encounter   Medication    [COMPLETED] haloperidol lactate injection 2 mg    [COMPLETED] HYDROmorphone injection 0.5 mg    [COMPLETED] HYDROmorphone injection 1 mg    [COMPLETED] insulin regular injection 6 Units 0.06 mL     Current Outpatient Medications on File Prior to Encounter   Medication Sig    acetaZOLAMIDE (DIAMOX) 250 MG tablet take 1 tablet by mouth 3 times a day    albuterol (VENTOLIN HFA) 90 mcg/actuation inhaler Inhale 2 puffs every 4 hours by inhalation route.    apixaban (ELIQUIS) 5 mg Tab Take 1 tablet (5 mg total) by mouth 2 (two) times daily. Patient w/ thrombocytopenia <50, so held during admission, follow-up with hematologist about restarting    atropine 1% (ISOPTO ATROPINE) 1 % Drop Place 1 drop into the left eye 2 (two) times a day.    blood sugar diagnostic (TRUE METRIX GLUCOSE TEST STRIP) Strp Use 1 strip to check blood glucose three times daily    blood-glucose meter (TRUE METRIX GLUCOSE METER) Misc Use to check blood glucose    blood-glucose meter,continuous (DEXCOM ) Misc USE WITH SENSORS    blood-glucose sensor (DEXCOM G7 SENSOR) Heather Use " as directed for CGM. Change sensor every 10 days    blood-glucose sensor Heather CHANGE EVERY 10 DAYS    brimonidine 0.15 % OPTH DROP (ALPHAGAN) 0.15 % ophthalmic solution Place 1 drop into both eyes 3 (three) times daily.    brinzolamide (AZOPT) 1 % ophthalmic suspension Place1 drop in left eye 3 times a day for 30 days    busPIRone (BUSPAR) 10 MG tablet Take 1 tablet (10 mg total) by mouth 3 (three) times daily as needed (anxiety).    cetirizine (ZYRTEC) 10 MG tablet Take 1 tablet every day by oral route.    dorzolamide-timolol 2-0.5% (COSOPT) 22.3-6.8 mg/mL ophthalmic solution place 1 drop in left eye twice a day  for 30 days    FLUoxetine 40 MG capsule Take 1 capsule every day by oral route.    fluticasone propionate (FLONASE ALLERGY RELIEF) 50 mcg/actuation nasal spray [place] 1 spray every day by intranasal route.    gabapentin (NEURONTIN) 300 MG capsule Take 2 capsules twice a day by oral route for 90 days.    hydrALAZINE (APRESOLINE) 25 MG tablet take 1 tablet by mouth every 8 hours    insulin aspart U-100 (NOVOLOG) 100 unit/mL (3 mL) InPn pen Inject 8 Units into the skin 3 (three) times daily with meals.    insulin glargine U-100, Lantus, (LANTUS SOLOSTAR U-100 INSULIN) 100 unit/mL (3 mL) InPn pen Inject 22 units every day by subcutaneous route in the evening for 30 days, for diabetes.    lancets (TRUEPLUS LANCETS) 33 gauge Misc Use 1 to check blood glucose three times daily    lancets 33 gauge Misc Use to test twice daily    levETIRAcetam (KEPPRA) 500 MG Tab Take 1 tablet (500 mg total) by mouth 2 (two) times daily.    LORazepam (ATIVAN) 0.5 MG tablet Take 1 tablet 3 times a day by oral route for 7 days, for severe panic.    ondansetron (ZOFRAN-ODT) 4 MG TbDL Place 1 tablet (4 mg) on or under the tongue every 8 hours for 10 days.    oxyCODONE-acetaminophen (PERCOCET)  mg per tablet Take 1 tablet by mouth every 4 (four) hours as needed for Pain.    pantoprazole (PROTONIX) 40 MG tablet Take 1 tablet (40 mg  "total) by mouth once daily.    pen needle, diabetic 31 gauge x 3/16" Ndle Use as directed with insulin once daily    prednisoLONE acetate (PRED FORTE) 1 % DrpS Place 1 drop into the left eye 2 (two) times daily.    promethazine (PHENERGAN) 25 MG tablet Take 1 tablet (25 mg total) by mouth every 6 (six) hours as needed.    sevelamer carbonate (RENVELA) 800 mg Tab Take 1 tablet (800 mg total) by mouth 3 (three) times daily with meals.    sucralfate (CARAFATE) 1 gram tablet Take 1 tablet (1 g total) by mouth 4 (four) times daily.    timolol maleate 0.5% (TIMOPTIC) 0.5 % Drop Place 1 drop in left eye 2 times a day for 30 days    [DISCONTINUED] atenoloL (TENORMIN) 50 MG tablet Take 1 tablet (50 mg total) by mouth every other day.    [DISCONTINUED] insulin detemir U-100, Levemir, (LEVEMIR FLEXTOUCH U100 INSULIN) 100 unit/mL (3 mL) InPn pen Inject 22 units every day by subcutaneous route in the evening for 90 days.    [DISCONTINUED] omeprazole (PRILOSEC) 20 MG capsule Take 2 capsules (40 mg total) by mouth once daily. for 10 days     Family History       Problem Relation (Age of Onset)    Diabetes Mother, Father          Tobacco Use    Smoking status: Never    Smokeless tobacco: Never   Substance and Sexual Activity    Alcohol use: Not Currently    Drug use: No    Sexual activity: Not Currently     Partners: Male     Birth control/protection: I.U.D.     Review of Systems   All other systems reviewed and are negative.    Objective:     Vital Signs (Most Recent):  Temp: 98 °F (36.7 °C) (08/29/24 0305)  Pulse: 81 (08/29/24 0300)  Resp: (!) 21 (08/29/24 0412)  BP: (!) 124/53 (08/29/24 0300)  SpO2: (!) 91 % (08/29/24 0300) Vital Signs (24h Range):  Temp:  [97.5 °F (36.4 °C)-98.5 °F (36.9 °C)] 98 °F (36.7 °C)  Pulse:  [] 81  Resp:  [12-27] 21  SpO2:  [91 %-100 %] 91 %  BP: (107-253)/() 124/53     Weight: 53.1 kg (117 lb)  Body mass index is 21.4 kg/m².     Physical Exam  Vitals and nursing note reviewed. "   Constitutional:       Appearance: Normal appearance.   HENT:      Head: Normocephalic.      Nose: Nose normal.      Mouth/Throat:      Mouth: Mucous membranes are moist.   Eyes:      Pupils: Pupils are equal, round, and reactive to light.   Cardiovascular:      Rate and Rhythm: Normal rate and regular rhythm.      Pulses: Normal pulses.      Heart sounds: Normal heart sounds.   Pulmonary:      Effort: Pulmonary effort is normal.      Breath sounds: Normal breath sounds.   Abdominal:      General: Abdomen is flat. Bowel sounds are normal.      Palpations: Abdomen is soft.      Tenderness: There is abdominal tenderness.      Comments: More right side and right flank tenderness    Musculoskeletal:         General: Normal range of motion.      Cervical back: Normal range of motion.   Skin:     General: Skin is warm.      Capillary Refill: Capillary refill takes less than 2 seconds.   Neurological:      General: No focal deficit present.      Mental Status: She is alert and oriented to person, place, and time.   Psychiatric:         Mood and Affect: Mood normal.         Behavior: Behavior normal.              CRANIAL NERVES     CN III, IV, VI   Pupils are equal, round, and reactive to light.       Significant Labs: All pertinent labs within the past 24 hours have been reviewed.  CBC:   Recent Labs   Lab 08/28/24  0750 08/28/24  1742 08/28/24  1933   WBC 8.08 8.74  --    HGB 9.1* 9.8*  --    HCT 26.9* 28.9* 53   * 119*  --      CMP:   Recent Labs   Lab 08/28/24  0750 08/28/24  1120 08/28/24  1742 08/28/24  2355    139 135* 138   K 4.3 4.4 5.1 4.1   CL 97 101 95 101   CO2 19* 21* 20* 21*   * 352* 396* 354*   BUN 54* 57* 56* 59*   CREATININE 9.5* 9.8* 8.7* 9.3*   CALCIUM 9.6 9.2 9.5 8.6*   PROT 6.8  --  7.6  --    ALBUMIN 3.3*  --  4.2  --    BILITOT 1.1*  --  1.2*  --    ALKPHOS 90  --  99  --    AST 12  --  24  --    ALT 17  --  16  --    ANIONGAP 22* 17* 20* 16     Lactic Acid:   Recent Labs   Lab  "08/28/24  1922   LACTATE 0.6     Lipase:   Recent Labs   Lab 08/28/24  0750 08/28/24  1742   LIPASE 27 17     Lipid Panel: No results for input(s): "CHOL", "HDL", "LDLCALC", "TRIG", "CHOLHDL" in the last 48 hours.  Magnesium:   Recent Labs   Lab 08/28/24  1742   MG 2.4     Troponin:   Recent Labs   Lab 08/28/24  1742   TROPONINIHS 117.5*     TSH:   Recent Labs   Lab 07/16/24  1610   TSH 1.355     Urine Studies: No results for input(s): "COLORU", "APPEARANCEUA", "PHUR", "SPECGRAV", "PROTEINUA", "GLUCUA", "KETONESU", "BILIRUBINUA", "OCCULTUA", "NITRITE", "UROBILINOGEN", "LEUKOCYTESUR", "RBCUA", "WBCUA", "BACTERIA", "SQUAMEPITHEL", "HYALINECASTS" in the last 48 hours.    Invalid input(s): "WRIGHTSUR"    Significant Imaging: I have reviewed all pertinent imaging results/findings within the past 24 hours.  I have reviewed and interpreted all pertinent imaging results/findings within the past 24 hours.  "

## 2024-08-29 NOTE — PROGRESS NOTES
Verified patient consent prior to treatment  HD treatment administered per policies and procedures  Net UF removed 3 L. Patient tolerated treatment well and is stable post treatment  Report given to SHAILA [place]  Patient educated on access maintenance and fluid intake       08/29/24 1530   Required for all Hemodialysis Patients   Hepatitis Status negative   Handoff Report   Received From [facility]   Given To Nancie   Treatment Type   Treatment Type Maintenance   Vital Signs   Temp 97.8 °F (36.6 °C)   Temp Source Oral   Pulse 87   Heart Rate Source Monitor   Resp 18   SpO2 100 %   Pulse Oximetry Type Continuous   Flow (L/min) (Oxygen Therapy) 2   Device (Oxygen Therapy) nasal cannula   /71   MAP (mmHg) 91   BP Location Right leg   BP Method Automatic   Patient Position Lying   Assessments (Pre/Post)   Consent Obtained yes   Safety vein preservation armband present   Date Hepatitis Profile Obtained 02/27/24   Blood Liters Processed (BLP) 50.9   Transport Modality other (see comments)  (Bedside treatment)   Level of Consciousness (AVPU) alert   Dialyzer Clearance mildly streaked   Pain   Preferred Pain Scale number (Numeric Rating Pain Scale)   Comfort/Acceptable Pain Level 0   Pain Rating (0-10): Rest 0   Pain Rating (0-10): Activity 0   Pre-Hemodialysis Assessment   Patient Status Departed  (bedside treatment)        Hemodialysis AV Fistula Left upper arm   No placement date or time found.   Location: Left upper arm   Site Assessment Clean;Dry;Intact   Patency Present;Thrill;Bruit   Status Deaccessed   Flows Good   Dressing Intervention First dressing   Dressing Status Clean;Dry;Intact   Site Condition No complications   Dressing Gauze   Post-Hemodialysis Assessment   Rinseback Volume (mL) 250 mL   Blood Volume Processed (Liters) 50.9 L   Dialyzer Clearance Lightly streaked   Duration of Treatment 180 minutes   Total UF (mL) 3500 mL   Net Fluid Removal 3000   Patient Response to Treatment Tolerated well    Post-Treatment Weight 50.1 kg (110 lb 7.2 oz)   Treatment Weight Change -3   Post-Hemodialysis Comments Patient stable post treatment and patient denies complaints

## 2024-08-29 NOTE — PLAN OF CARE
[facility]  Initial Discharge Assessment    SW met Pt ad bedside and she confirmed all information on face sheet is correct. Pt lives with her dependent children in [place], [place] and he dad will transport her back home at discharge. Pt sees Melony Kim MD and last visit was 3 weeks ago. Pt receives dialysis services from [facility]. Pt denies and DME, HH, and blood thinners. Case management will continue to follow.     Primary Care Provider: Melony Kim NP    Admission Diagnosis: Uncontrolled hypertension [I10]    Admission Date: 8/28/2024  Expected Discharge Date:     Transition of Care Barriers: None    Payor: MEDICAID / Plan: [place] / Product Type: Managed Medicaid /     Extended Emergency Contact Information  Primary Emergency Contact: Tanya Howard  Address: [address] [place], [place] [address] [facility]  Home Phone: [phone]  Mobile Phone: [phone]  Relation: Step parent  Preferred language: English   needed? No  Secondary Emergency Contact: Garcia Howard  Address: [address] [place] [address]  Mobile Phone: [phone]  Relation: Father  Preferred language: English   needed? No  Mother: Svetlana Freire  Mobile Phone: [phone]    Discharge Plan A: Home with family  Discharge Plan B: Home      [facility]  [address]  [facility] [address]  Phone: [phone] Fax: [phone]      Initial Assessment (most recent)       Adult Discharge Assessment - 08/29/24 1111          Discharge Assessment    Assessment Type Discharge Planning Assessment     Confirmed/corrected address, phone number and insurance Yes     Confirmed Demographics Correct on Facesheet     Source of Information patient     If unable to respond/provide information was family/caregiver contacted? Yes     Contact Name/Number Tanya Howard (Step parent)  [phone] (Mobile)     When was your last  doctors appointment? --   Three weeks ago    Does patient/caregiver understand observation status Yes     Reason For Admission DKA     People in Home child(tammy), dependent     Do you expect to return to your current living situation? Yes     Do you have help at home or someone to help you manage your care at home? Yes     Who are your caregiver(s) and their phone number(s)? Tanya Howard (Step parent)  [phone] (Mobile)     Prior to hospitilization cognitive status: Alert/Oriented     Current cognitive status: Alert/Oriented     Walking or Climbing Stairs Difficulty no     Dressing/Bathing Difficulty no     Home Accessibility wheelchair accessible     Home Layout Able to live on 1st floor     Equipment Currently Used at Home none     Readmission within 30 days? Yes     Patient currently being followed by outpatient case management? No     Do you currently have service(s) that help you manage your care at home? No     Do you take prescription medications? Yes     Do you have prescription coverage? Yes     Coverage MEDICAID - [place] [employer] [place]     Do you have any problems affording any of your prescribed medications? No     Is the patient taking medications as prescribed? yes     Who is going to help you get home at discharge? Tanya Howard (Step parent)  [phone] (Mobile)     How do you get to doctors appointments? car, drives self     Are you on dialysis? Yes     Dialysis Name and Scheduled days [facility]     Do you take coumadin? No     Discharge Plan A Home with family     Discharge Plan B Home     DME Needed Upon Discharge  none     Discharge Plan discussed with: Patient     Transition of Care Barriers None        Physical Activity    On average, how many days per week do you engage in moderate to strenuous exercise (like a brisk walk)? 5 days     On average, how many minutes do you engage in exercise at this level? 30 min        Financial Resource Strain    How hard is it for you to pay  for the very basics like food, housing, medical care, and heating? Not hard at all        Housing Stability    In the last 12 months, was there a time when you were not able to pay the mortgage or rent on time? No     At any time in the past 12 months, were you homeless or living in a shelter (including now)? No        Transportation Needs    Has the lack of transportation kept you from medical appointments, meetings, work or from getting things needed for daily living? No        Food Insecurity    Within the past 12 months, you worried that your food would run out before you got the money to buy more. Never true     Within the past 12 months, the food you bought just didn't last and you didn't have money to get more. Never true        Stress    Do you feel stress - tense, restless, nervous, or anxious, or unable to sleep at night because your mind is troubled all the time - these days? Not at all        Social Isolation    How often do you feel lonely or isolated from those around you?  Never        Alcohol Use    Q1: How often do you have a drink containing alcohol? Never     Q2: How many drinks containing alcohol do you have on a typical day when you are drinking? Patient does not drink     Q3: How often do you have six or more drinks on one occasion? Never        Utilities    In the past 12 months has the electric, gas, oil, or water company threatened to shut off services in your home? No        Health Literacy    How often do you need to have someone help you when you read instructions, pamphlets, or other written material from your doctor or pharmacy? Never

## 2024-08-29 NOTE — CARE UPDATE
Patient readmitted with DKA and left flank pain. She was discharged on 8/26 and did not take her insulin as prescribed. She went back into DKA. Now off pathway. Off insulin drip and receiving HD.    Ok to transfer out ICU.  Needs a new glucometer that works.

## 2024-08-29 NOTE — NURSING
Report called to nurse Pugh. Transferred to room 1211 in wheelchair with belongings, chart, and insulin pens.

## 2024-08-29 NOTE — PLAN OF CARE
Problem: Violence Risk or Actual  Goal: Anger and Impulse Control  Outcome: Progressing     Problem: Diabetic Ketoacidosis  Goal: Optimal Coping  Outcome: Progressing  Goal: Fluid and Electrolyte Balance with Absence of Ketosis  Outcome: Progressing     Problem: Adult Inpatient Plan of Care  Goal: Plan of Care Review  Outcome: Progressing  Goal: Patient-Specific Goal (Individualized)  Outcome: Progressing  Goal: Absence of Hospital-Acquired Illness or Injury  Outcome: Progressing  Goal: Optimal Comfort and Wellbeing  Outcome: Progressing  Goal: Readiness for Transition of Care  Outcome: Progressing     Problem: Diabetes Comorbidity  Goal: Blood Glucose Level Within Targeted Range  Outcome: Progressing     Problem: Hemodialysis  Goal: Safe, Effective Therapy Delivery  Outcome: Progressing  Goal: Effective Tissue Perfusion  Outcome: Progressing  Goal: Absence of Infection Signs and Symptoms  Outcome: Progressing

## 2024-08-30 LAB
ALBUMIN SERPL BCP-MCNC: 3.4 G/DL (ref 3.5–5.2)
ALP SERPL-CCNC: 87 U/L (ref 55–135)
ALT SERPL W/O P-5'-P-CCNC: 10 U/L (ref 10–44)
ANION GAP SERPL CALC-SCNC: 13 MMOL/L (ref 8–16)
AST SERPL-CCNC: 11 U/L (ref 10–40)
BASOPHILS # BLD AUTO: 0.01 K/UL (ref 0–0.2)
BASOPHILS NFR BLD: 0.2 % (ref 0–1.9)
BILIRUB SERPL-MCNC: 1.1 MG/DL (ref 0.1–1)
BUN SERPL-MCNC: 30 MG/DL (ref 6–20)
CALCIUM SERPL-MCNC: 8.1 MG/DL (ref 8.7–10.5)
CHLORIDE SERPL-SCNC: 100 MMOL/L (ref 95–110)
CO2 SERPL-SCNC: 27 MMOL/L (ref 23–29)
CREAT SERPL-MCNC: 6 MG/DL (ref 0.5–1.4)
DIFFERENTIAL METHOD BLD: ABNORMAL
EOSINOPHIL # BLD AUTO: 0.1 K/UL (ref 0–0.5)
EOSINOPHIL NFR BLD: 2.1 % (ref 0–8)
ERYTHROCYTE [DISTWIDTH] IN BLOOD BY AUTOMATED COUNT: 17.1 % (ref 11.5–14.5)
EST. GFR  (NO RACE VARIABLE): 8.8 ML/MIN/1.73 M^2
GLUCOSE SERPL-MCNC: 130 MG/DL (ref 70–110)
GLUCOSE SERPL-MCNC: 264 MG/DL (ref 70–110)
GLUCOSE SERPL-MCNC: 265 MG/DL (ref 70–110)
GLUCOSE SERPL-MCNC: 299 MG/DL (ref 70–110)
GLUCOSE SERPL-MCNC: 328 MG/DL (ref 70–110)
GLUCOSE SERPL-MCNC: 383 MG/DL (ref 70–110)
HCT VFR BLD AUTO: 25.5 % (ref 37–48.5)
HGB BLD-MCNC: 8.3 G/DL (ref 12–16)
IMM GRANULOCYTES # BLD AUTO: 0.01 K/UL (ref 0–0.04)
IMM GRANULOCYTES NFR BLD AUTO: 0.2 % (ref 0–0.5)
LYMPHOCYTES # BLD AUTO: 0.5 K/UL (ref 1–4.8)
LYMPHOCYTES NFR BLD: 11 % (ref 18–48)
MAGNESIUM SERPL-MCNC: 2.1 MG/DL (ref 1.6–2.6)
MCH RBC QN AUTO: 28.9 PG (ref 27–31)
MCHC RBC AUTO-ENTMCNC: 32.5 G/DL (ref 32–36)
MCV RBC AUTO: 89 FL (ref 82–98)
MONOCYTES # BLD AUTO: 0.7 K/UL (ref 0.3–1)
MONOCYTES NFR BLD: 14.1 % (ref 4–15)
NEUTROPHILS # BLD AUTO: 3.5 K/UL (ref 1.8–7.7)
NEUTROPHILS NFR BLD: 72.4 % (ref 38–73)
NRBC BLD-RTO: 0 /100 WBC
PHOSPHATE SERPL-MCNC: 5.3 MG/DL (ref 2.7–4.5)
PLATELET # BLD AUTO: 145 K/UL (ref 150–450)
PMV BLD AUTO: 11.1 FL (ref 9.2–12.9)
POTASSIUM SERPL-SCNC: 4.8 MMOL/L (ref 3.5–5.1)
PROT SERPL-MCNC: 6.3 G/DL (ref 6–8.4)
RBC # BLD AUTO: 2.87 M/UL (ref 4–5.4)
SODIUM SERPL-SCNC: 140 MMOL/L (ref 136–145)
WBC # BLD AUTO: 4.83 K/UL (ref 3.9–12.7)

## 2024-08-30 PROCEDURE — 36415 COLL VENOUS BLD VENIPUNCTURE: CPT | Performed by: INTERNAL MEDICINE

## 2024-08-30 PROCEDURE — G0378 HOSPITAL OBSERVATION PER HR: HCPCS

## 2024-08-30 PROCEDURE — 25000003 PHARM REV CODE 250: Performed by: INTERNAL MEDICINE

## 2024-08-30 PROCEDURE — 27000221 HC OXYGEN, UP TO 24 HOURS

## 2024-08-30 PROCEDURE — 83735 ASSAY OF MAGNESIUM: CPT | Performed by: INTERNAL MEDICINE

## 2024-08-30 PROCEDURE — 99900031 HC PATIENT EDUCATION (STAT)

## 2024-08-30 PROCEDURE — 94761 N-INVAS EAR/PLS OXIMETRY MLT: CPT

## 2024-08-30 PROCEDURE — 96376 TX/PRO/DX INJ SAME DRUG ADON: CPT

## 2024-08-30 PROCEDURE — 84100 ASSAY OF PHOSPHORUS: CPT | Performed by: INTERNAL MEDICINE

## 2024-08-30 PROCEDURE — 85025 COMPLETE CBC W/AUTO DIFF WBC: CPT | Performed by: INTERNAL MEDICINE

## 2024-08-30 PROCEDURE — 80053 COMPREHEN METABOLIC PANEL: CPT | Performed by: INTERNAL MEDICINE

## 2024-08-30 PROCEDURE — 63600175 PHARM REV CODE 636 W HCPCS: Performed by: INTERNAL MEDICINE

## 2024-08-30 PROCEDURE — 96372 THER/PROPH/DIAG INJ SC/IM: CPT | Performed by: INTERNAL MEDICINE

## 2024-08-30 RX ORDER — HYDROCODONE BITARTRATE AND ACETAMINOPHEN 10; 325 MG/1; MG/1
1 TABLET ORAL EVERY 6 HOURS PRN
Status: DISCONTINUED | OUTPATIENT
Start: 2024-08-30 | End: 2024-08-31 | Stop reason: HOSPADM

## 2024-08-30 RX ORDER — HYDROMORPHONE HYDROCHLORIDE 1 MG/ML
0.5 INJECTION, SOLUTION INTRAMUSCULAR; INTRAVENOUS; SUBCUTANEOUS ONCE
Status: COMPLETED | OUTPATIENT
Start: 2024-08-30 | End: 2024-08-30

## 2024-08-30 RX ORDER — HYDROMORPHONE HYDROCHLORIDE 1 MG/ML
0.5 INJECTION, SOLUTION INTRAMUSCULAR; INTRAVENOUS; SUBCUTANEOUS EVERY 6 HOURS PRN
Status: DISCONTINUED | OUTPATIENT
Start: 2024-08-30 | End: 2024-08-31 | Stop reason: HOSPADM

## 2024-08-30 RX ADMIN — FLUOXETINE HYDROCHLORIDE 40 MG: 20 CAPSULE ORAL at 10:08

## 2024-08-30 RX ADMIN — LEVETIRACETAM 500 MG: 500 TABLET, FILM COATED ORAL at 08:08

## 2024-08-30 RX ADMIN — HYDROMORPHONE HYDROCHLORIDE 0.5 MG: 0.5 INJECTION, SOLUTION INTRAMUSCULAR; INTRAVENOUS; SUBCUTANEOUS at 05:08

## 2024-08-30 RX ADMIN — METOCLOPRAMIDE HYDROCHLORIDE 5 MG: 5 INJECTION INTRAMUSCULAR; INTRAVENOUS at 05:08

## 2024-08-30 RX ADMIN — LEVETIRACETAM 500 MG: 500 TABLET, FILM COATED ORAL at 10:08

## 2024-08-30 RX ADMIN — GABAPENTIN 300 MG: 300 CAPSULE ORAL at 10:08

## 2024-08-30 RX ADMIN — INSULIN GLARGINE 10 UNITS: 100 INJECTION, SOLUTION SUBCUTANEOUS at 10:08

## 2024-08-30 RX ADMIN — HYDRALAZINE HYDROCHLORIDE 50 MG: 25 TABLET ORAL at 08:08

## 2024-08-30 RX ADMIN — BUSPIRONE HYDROCHLORIDE 10 MG: 5 TABLET ORAL at 08:08

## 2024-08-30 RX ADMIN — INSULIN ASPART 10 UNITS: 100 INJECTION, SOLUTION INTRAVENOUS; SUBCUTANEOUS at 01:08

## 2024-08-30 RX ADMIN — INSULIN ASPART 6 UNITS: 100 INJECTION, SOLUTION INTRAVENOUS; SUBCUTANEOUS at 10:08

## 2024-08-30 RX ADMIN — APIXABAN 5 MG: 5 TABLET, FILM COATED ORAL at 08:08

## 2024-08-30 RX ADMIN — HYDROCODONE BITARTRATE AND ACETAMINOPHEN 1 TABLET: 10; 325 TABLET ORAL at 02:08

## 2024-08-30 RX ADMIN — BUSPIRONE HYDROCHLORIDE 10 MG: 5 TABLET ORAL at 10:08

## 2024-08-30 RX ADMIN — INSULIN ASPART 3 UNITS: 100 INJECTION, SOLUTION INTRAVENOUS; SUBCUTANEOUS at 08:08

## 2024-08-30 RX ADMIN — SENNOSIDES AND DOCUSATE SODIUM 1 TABLET: 50; 8.6 TABLET ORAL at 10:08

## 2024-08-30 RX ADMIN — HYDRALAZINE HYDROCHLORIDE 50 MG: 25 TABLET ORAL at 05:08

## 2024-08-30 RX ADMIN — HYDRALAZINE HYDROCHLORIDE 50 MG: 25 TABLET ORAL at 01:08

## 2024-08-30 RX ADMIN — FAMOTIDINE 20 MG: 10 INJECTION, SOLUTION INTRAVENOUS at 08:08

## 2024-08-30 RX ADMIN — METOCLOPRAMIDE HYDROCHLORIDE 5 MG: 5 INJECTION INTRAMUSCULAR; INTRAVENOUS at 11:08

## 2024-08-30 RX ADMIN — HYDROMORPHONE HYDROCHLORIDE 0.5 MG: 0.5 INJECTION, SOLUTION INTRAMUSCULAR; INTRAVENOUS; SUBCUTANEOUS at 07:08

## 2024-08-30 RX ADMIN — Medication 6 MG: at 08:08

## 2024-08-30 RX ADMIN — APIXABAN 5 MG: 5 TABLET, FILM COATED ORAL at 10:08

## 2024-08-30 RX ADMIN — METOCLOPRAMIDE HYDROCHLORIDE 5 MG: 5 INJECTION INTRAMUSCULAR; INTRAVENOUS at 10:08

## 2024-08-30 RX ADMIN — GABAPENTIN 300 MG: 300 CAPSULE ORAL at 08:08

## 2024-08-30 NOTE — RESPIRATORY THERAPY
08/29/24 2025   Patient Assessment/Suction   Level of Consciousness (AVPU) alert   Respiratory Effort Normal;Unlabored   PRE-TX-O2   Device (Oxygen Therapy) nasal cannula   Flow (L/min) (Oxygen Therapy) 2   SpO2 (!) 92 %   Pulse Oximetry Type Intermittent   $ Pulse Oximetry - Multiple Charge Pulse Oximetry - Multiple   Education   $ Education Oxygen;15 min

## 2024-08-30 NOTE — PROGRESS NOTES
[facility] Medicine  Progress Note    Patient Name: Tabby Howard  MRN: [id number]  Patient Class: OP- Observation   Admission Date: 8/28/2024  Length of Stay: 0 days  Attending Physician: Alice Fish MD  Primary Care Provider: Melony Kim NP        Subjective:     Principal Problem:DKA (diabetic ketoacidosis)        HPI:  Tabby Howard is a 34 y.o. female with PMH as below who presents to the [facility] emergency department for evaluation of acute on chronic generalized abdominal pain, worse in the left flank, identical to prior episodes diagnosed as gastroparesis. Patient has ESRD and is on HD TThSa but missed last session due to transportation difficulties.        She has a PMH of DM on insulin , and ESRD on HD and HTN.      She was here this month for DKA and sent home but said she never really felt better and ended up being sent back here for DKA again and was just discharged few days ago for that.  That was 8/26 8/26 DC Summary    Hospital Course:   34F p/w 10/10 abdominal pain, located diffusely throughout, with associated vomiting. Patient reports that this level of pain is not typical for her gastroparesis. She reports she is unable to manage this discomfort at home. She presented for this discomfort to the [facility] ED on 8/22/24 PM and was discharged early 8/23/24 AM; she reported that she never truly felt better, but she did go to HD on 8/24/24, and then decided to seek medical attention at the [facility] ED thereafter. She does not believe that she had additional fluid removed during the 8/24/24 HD session.   Patient admitted with DKA. Off pathway. Transitioned to SSI. Advanced to clear liquid diet.. SSI started. Resume lantus if tolerating oral diet. Very brittle type 1 diabetic. Dropped glucose on insulin drip to 50-60s. Infusion shut off and transferred to med surg floor.  In the med surg her sugars and other labs were well controlled.  Just feels mild low back  pain but other than that feels back to her baseline.  Able to be discharged home and per pharmacy check it appears she has not had any mealtime insulin feel even though she says she takes it every day in addition to her Lantus.  I will discharge her home with a refill for her mealtime insulin as her sugars are well controlled here on her normal outpatient regimen, she just needs to take it consistently and he consistent meals.           She tells me that she never checked her sugars since going home from here on the 26th because her glucose monitor , dexcom I think , was not working or she needed a piece to it she said.   So because of that she was NOT taking any insulin she said since being home and was not eating.  Said she didn't want to eat     Then started getting nausea vomiting and abdominal pains again . Same as before.     So she went to [facility].   They said its gastroparesis and sent her home    Her labs there did show hyperglycemia and Metabolic acidosis with Anion gap . However no ketones checked.  No BHB checked so I can't say if she was in DKA then or not      But she came here next to our ER      DKA in our ER was identified quickly and Insulin drip started and IVFs    Her BP was very high also so they did Cardene drip        Plan was to admit to ICU For DKA    Overview/Hospital Course:  The patient was admitted on insulin drip and glucose levels dropped. Insulin drip was stopped and SSI was started. Her abdominal pain improved. Her glucose remained somewhat high, and this was monitored. Her blood pressure was elevated, and this improved.    Interval History: Patient states she still does not feel well.    Review of Systems   Constitutional:  Negative for activity change, appetite change, chills and fever.   HENT:  Negative for congestion, ear pain, nosebleeds and sinus pain.    Eyes:  Negative for discharge and itching.   Respiratory:  Negative for apnea, cough, chest tightness and shortness of  breath.    Cardiovascular:  Negative for chest pain, palpitations and leg swelling.   Gastrointestinal:  Positive for abdominal pain. Negative for abdominal distention and vomiting.   Genitourinary:  Negative for difficulty urinating, dysuria, flank pain and frequency.   Musculoskeletal:  Negative for arthralgias, back pain, joint swelling and myalgias.   Skin:  Negative for color change, pallor and rash.   Neurological:  Negative for dizziness, weakness, light-headedness and headaches.   Psychiatric/Behavioral:  Negative for agitation, behavioral problems, confusion and suicidal ideas.      Objective:     Vital Signs (Most Recent):  Temp: 98.5 °F (36.9 °C) (08/30/24 1155)  Pulse: 87 (08/30/24 1155)  Resp: 18 (08/30/24 1155)  BP: 120/79 (08/30/24 1155)  SpO2: (!) 92 % (08/30/24 1155) Vital Signs (24h Range):  Temp:  [97.7 °F (36.5 °C)-98.7 °F (37.1 °C)] 98.5 °F (36.9 °C)  Pulse:  [] 87  Resp:  [18] 18  SpO2:  [92 %-100 %] 92 %  BP: (110-177)/(50-97) 120/79     Weight: 53.1 kg (117 lb)  Body mass index is 21.4 kg/m².    Intake/Output Summary (Last 24 hours) at 8/30/2024 1203  Last data filed at 8/30/2024 0942  Gross per 24 hour   Intake 720 ml   Output 3500 ml   Net -2780 ml         Physical Exam  Vitals reviewed.   Constitutional:       General: She is not in acute distress.     Appearance: Normal appearance. She is normal weight. She is not ill-appearing.   HENT:      Head: Normocephalic and atraumatic.      Nose: Nose normal.      Mouth/Throat:      Mouth: Mucous membranes are moist.      Pharynx: Oropharynx is clear.   Eyes:      General: No scleral icterus.     Extraocular Movements: Extraocular movements intact.      Conjunctiva/sclera: Conjunctivae normal.      Pupils: Pupils are equal, round, and reactive to light.   Cardiovascular:      Rate and Rhythm: Normal rate and regular rhythm.      Pulses: Normal pulses.      Heart sounds: Normal heart sounds. No murmur heard.     No gallop.   Pulmonary:       Effort: Pulmonary effort is normal. No respiratory distress.      Breath sounds: Normal breath sounds. No wheezing, rhonchi or rales.   Chest:      Chest wall: No tenderness.   Abdominal:      General: Abdomen is flat. There is no distension.      Palpations: Abdomen is soft.      Tenderness: There is abdominal tenderness.   Musculoskeletal:         General: No swelling or tenderness.      Cervical back: Normal range of motion and neck supple. No rigidity or tenderness.      Right lower leg: No edema.      Left lower leg: No edema.   Skin:     General: Skin is warm and dry.   Neurological:      General: No focal deficit present.      Mental Status: She is alert and oriented to person, place, and time. Mental status is at baseline.      Motor: No weakness.   Psychiatric:         Mood and Affect: Mood normal.         Behavior: Behavior normal.         Thought Content: Thought content normal.             Significant Labs: All pertinent labs within the past 24 hours have been reviewed.    Significant Imaging: I have reviewed all pertinent imaging results/findings within the past 24 hours.    Assessment/Plan:      * DKA (diabetic ketoacidosis)  Improved  On SSI  Received a glucometer from pharmacy last admission. Per pharmacy, will need rx for strips and lancets on d/c.    Uncontrolled hypertension  Improved, monitor    ESRD (end stage renal disease)  Nephrology following      VTE Risk Mitigation (From admission, onward)           Ordered     apixaban tablet 5 mg  2 times daily         08/29/24 0433     IP VTE HIGH RISK PATIENT  Once         08/28/24 2123     Place sequential compression device  Until discontinued         08/28/24 2123     Reason for No Pharmacological VTE Prophylaxis  Once        Question:  Reasons:  Answer:  Patient is Ambulatory    08/28/24 2123     Place JOCELYNE hose  Until discontinued         08/28/24 2121     Place sequential compression device  Until discontinued         08/28/24 2121     Place  sequential compression device  Until discontinued         08/28/24 2059                    Discharge Planning   TATIANA: 8/31/2024     Code Status: Full Code   Is the patient medically ready for discharge?:     Reason for patient still in hospital (select all that apply): Patient trending condition  Discharge Plan A: Home with family                  Alice Fish MD, MD  Department of Hospital Medicine   [facility]

## 2024-08-30 NOTE — CONSULTS
Patient reports having diabetic education in the past and answered questions appropriately.    Educated pt on significance of A1c and how it is affected and goal ranges. Educated on carb choices and serving sizes of 1 choice. Pairing carbohydrates with a fat, fiber, or protein to prevent increase in blood glucose. Educated pt on MyPlate and what a plate should consist of. Educated pt on consistent carbohydrate intake throughout the day with appropriate insulin coverage. Recommended checking blood glucose daily. Educated pt on heart healthy diet. Education focused on including healthy fats- gave examples and lowering LDL levels by excluding saturated/trans fat in the diet. Went over types of foods that have saturated/trans fat. Encouraged cooking with olive oil instead of butter. Choosing whole grains over refined grains, choosing canned foods with no salt added and plan frozen veggies instead of veggies cooked in butter and cream sauces. Encouraged patient to choose leaner cuts of meat and decreasing high fat meats such as aragon, sausage, hot dogs, etc. Educated pt on decreasing sodium in diet. To try not to cook with salt and use herbs and spices to make food more flavorful. To limit eating out-if patient chooses to eat out, informed patient to discuss with  to cook meats and veggies with no salt and olive oil instead of butter. Provided handouts on all of these topics for pt to use in the future. Also provided RD contact information for pt to call with future questions.

## 2024-08-30 NOTE — ASSESSMENT & PLAN NOTE
Improved  On SSI  Received a glucometer from pharmacy last admission. Per pharmacy, will need rx for strips and lancets on d/c.

## 2024-08-30 NOTE — SUBJECTIVE & OBJECTIVE
Interval History: Patient states she still does not feel well.    Review of Systems   Constitutional:  Negative for activity change, appetite change, chills and fever.   HENT:  Negative for congestion, ear pain, nosebleeds and sinus pain.    Eyes:  Negative for discharge and itching.   Respiratory:  Negative for apnea, cough, chest tightness and shortness of breath.    Cardiovascular:  Negative for chest pain, palpitations and leg swelling.   Gastrointestinal:  Positive for abdominal pain. Negative for abdominal distention and vomiting.   Genitourinary:  Negative for difficulty urinating, dysuria, flank pain and frequency.   Musculoskeletal:  Negative for arthralgias, back pain, joint swelling and myalgias.   Skin:  Negative for color change, pallor and rash.   Neurological:  Negative for dizziness, weakness, light-headedness and headaches.   Psychiatric/Behavioral:  Negative for agitation, behavioral problems, confusion and suicidal ideas.      Objective:     Vital Signs (Most Recent):  Temp: 98.5 °F (36.9 °C) (08/30/24 1155)  Pulse: 87 (08/30/24 1155)  Resp: 18 (08/30/24 1155)  BP: 120/79 (08/30/24 1155)  SpO2: (!) 92 % (08/30/24 1155) Vital Signs (24h Range):  Temp:  [97.7 °F (36.5 °C)-98.7 °F (37.1 °C)] 98.5 °F (36.9 °C)  Pulse:  [] 87  Resp:  [18] 18  SpO2:  [92 %-100 %] 92 %  BP: (110-177)/(50-97) 120/79     Weight: 53.1 kg (117 lb)  Body mass index is 21.4 kg/m².    Intake/Output Summary (Last 24 hours) at 8/30/2024 1203  Last data filed at 8/30/2024 0942  Gross per 24 hour   Intake 720 ml   Output 3500 ml   Net -2780 ml         Physical Exam  Vitals reviewed.   Constitutional:       General: She is not in acute distress.     Appearance: Normal appearance. She is normal weight. She is not ill-appearing.   HENT:      Head: Normocephalic and atraumatic.      Nose: Nose normal.      Mouth/Throat:      Mouth: Mucous membranes are moist.      Pharynx: Oropharynx is clear.   Eyes:      General: No scleral  icterus.     Extraocular Movements: Extraocular movements intact.      Conjunctiva/sclera: Conjunctivae normal.      Pupils: Pupils are equal, round, and reactive to light.   Cardiovascular:      Rate and Rhythm: Normal rate and regular rhythm.      Pulses: Normal pulses.      Heart sounds: Normal heart sounds. No murmur heard.     No gallop.   Pulmonary:      Effort: Pulmonary effort is normal. No respiratory distress.      Breath sounds: Normal breath sounds. No wheezing, rhonchi or rales.   Chest:      Chest wall: No tenderness.   Abdominal:      General: Abdomen is flat. There is no distension.      Palpations: Abdomen is soft.      Tenderness: There is abdominal tenderness.   Musculoskeletal:         General: No swelling or tenderness.      Cervical back: Normal range of motion and neck supple. No rigidity or tenderness.      Right lower leg: No edema.      Left lower leg: No edema.   Skin:     General: Skin is warm and dry.   Neurological:      General: No focal deficit present.      Mental Status: She is alert and oriented to person, place, and time. Mental status is at baseline.      Motor: No weakness.   Psychiatric:         Mood and Affect: Mood normal.         Behavior: Behavior normal.         Thought Content: Thought content normal.             Significant Labs: All pertinent labs within the past 24 hours have been reviewed.    Significant Imaging: I have reviewed all pertinent imaging results/findings within the past 24 hours.

## 2024-08-30 NOTE — PLAN OF CARE
D/c clinic appointment scheduled.      08/30/24 1427   Discharge Reassessment   Assessment Type Discharge Planning Reassessment   Did the patient's condition or plan change since previous assessment? No

## 2024-08-30 NOTE — HOSPITAL COURSE
The patient was admitted with DKA on insulin drip and glucose was controlled. Insulin drip was stopped and SSI was started. Her abdominal pain improved. Her glucose remained somewhat high, and this was monitored. Her blood pressure was elevated, and this improved. Patient was discharged recently after treated for DKA and did not take her insulin since going home. During exam, patient is not interested in talking. Advised her about the compliance but she keeps her self under the covers. New glucometer sent per CM.

## 2024-08-30 NOTE — CONSULTS
INPATIENT NEPHROLOGY CONSULT   [facility] [place] [facility]    Patient Name: Tabby Howard  Date: 08/30/2024    Reason for consultation: ESRD    Chief Complaint:   Chief Complaint   Patient presents with    Abdominal Pain     Pt complains of severe lower quad ABD pain accompanied by bilateral kidney pain. Pt was seen at [place] [facility] and was told she needed dialysis. Pt was unable to receive dialysis due to pain and was sent to ED for further eval.       History of Present Illness:  35 y/o F with ESRD on HD TTS, type I DM on insulin, and HTN who p/w DKA, consulted for dialysis.  Reports abd pain, inability tolerate PO intake, unable to attend HD as a result. Wasn't taking insulin or eating at home. Went to another hospital yest- told it was gastroparesis flare and sent home. She went to HD but was so miserable they sent her directly to [facility]. She is on the DKA pathway. Started on cardene gtt for high BP as well.    Interval History:  8/29- BP better, on 2L NC, off insulin and cardene gtts, still with abd pain, doesn't have much fluid on her, not eating  8/30  AFVSS, tolerated 3 liter ultrafiltration with hd yesterday     Plan of Care:    Assessment:  DKA   ESRD on HD TTS  HTN urgency  SHPT  Anemia of CKD    Plan:    - DKA resolved  - ordered HD TTS  - off cardene gtt- resumed home BP meds- ordered 1-3L UF with hd as tolerated  - renal diet, 1.5L fluid restriction  - resume binder with meals  - ordered SHELLEY with HD    Thank you for allowing us to participate in this patient's care. We will continue to follow.    Vital Signs:  Temp Readings from Last 3 Encounters:   08/30/24 98.5 °F (36.9 °C) (Oral)   08/28/24 98.4 °F (36.9 °C) (Oral)   08/26/24 97.7 °F (36.5 °C) (Oral)       Pulse Readings from Last 3 Encounters:   08/30/24 87   08/28/24 97   08/26/24 82       BP Readings from Last 3 Encounters:   08/30/24 120/79   08/28/24 (!) 167/69   08/26/24 (!) 158/90       Weight:  Wt Readings from Last 3 Encounters:   08/28/24  53.1 kg (117 lb)   24 53.1 kg (117 lb)   24 51 kg (112 lb 7 oz)       Past Medical & Surgical History:  Past Medical History:   Diagnosis Date    ESRD on hemodialysis 2022    Gastritis 2022    EGD was 22    Gastroparesis 2022    has not had Emptying study    Heart failure with preserved ejection fraction 2022    EF 55% on 3/22    History of supraventricular tachycardia     Hyperkalemia 2022    Hypertensive emergency 2022    Sickle cell trait 2022    Type 2 diabetes mellitus        Past Surgical History:   Procedure Laterality Date     SECTION      x 3    COLONOSCOPY      COLONOSCOPY N/A 2022    Procedure: COLONOSCOPY;  Surgeon: Jagdeep Cedeno MD;  Location: [facility];  Service: Endoscopy;  Laterality: N/A;    DESTRUCTION, CILIARY BODY, USING LASER Left 2024    Procedure: Cyclophotocoagulation G-probe, retrobulbar chlorpromazine left eye;  Surgeon: Fidel Wise MD;  Location: [address];  Service: Ophthalmology;  Laterality: Left;    ENDOSCOPIC ULTRASOUND OF UPPER GASTROINTESTINAL TRACT N/A 2023    Procedure: ULTRASOUND, UPPER GI TRACT, ENDOSCOPIC;  Surgeon: Micky Paredes III, MD;  Location: [facility];  Service: Endoscopy;  Laterality: N/A;    ESOPHAGOGASTRODUODENOSCOPY N/A 10/18/2019    Procedure: ESOPHAGOGASTRODUODENOSCOPY (EGD);  Surgeon: Gianluca Mendez MD;  Location: [facility];  Service: Endoscopy;  Laterality: N/A;    ESOPHAGOGASTRODUODENOSCOPY N/A 2022    Procedure: EGD (ESOPHAGOGASTRODUODENOSCOPY);  Surgeon: Micky Paredes III, MD;  Location: [facility];  Service: Endoscopy;  Laterality: N/A;    ESOPHAGOGASTRODUODENOSCOPY N/A 2022    Procedure: EGD (ESOPHAGOGASTRODUODENOSCOPY);  Surgeon: Marcelo Zhong MD;  Location: [facility];  Service: Endoscopy;  Laterality: N/A;    EYE SURGERY      INSERTION, CATHETER, TUNNELED N/A 2023    Procedure: Insertion,catheter,tunneled;  Surgeon: Carlos ANDRE  Olman Angulo MD;  Location: [facility] OR;  Service: General;  Laterality: N/A;    LAPAROSCOPIC CHOLECYSTECTOMY N/A 07/30/2022    Procedure: CHOLECYSTECTOMY, LAPAROSCOPIC;  Surgeon: Grey Perez MD;  Location: [facility] OR;  Service: General;  Laterality: N/A;    PLACEMENT OF DUAL-LUMEN VASCULAR CATHETER Left 07/12/2022    Procedure: INSERTION-CATHETER-JOSEPH;  Surgeon: Dionte Gan MD;  Location: [facility] OR;  Service: General;  Laterality: Left;    PLACEMENT OF DUAL-LUMEN VASCULAR CATHETER Right 07/26/2022    Procedure: INSERTION-CATHETER-Hemosplit;  Surgeon: Dionte Gan MD;  Location: [facility] OR;  Service: General;  Laterality: Right;       Past Social History:  Social History     Socioeconomic History    Marital status:    Tobacco Use    Smoking status: Never    Smokeless tobacco: Never   Substance and Sexual Activity    Alcohol use: Not Currently    Drug use: No    Sexual activity: Not Currently     Partners: Male     Birth control/protection: I.U.D.     Social Determinants of Health     Financial Resource Strain: Low Risk  (8/29/2024)    Overall Financial Resource Strain (CARDIA)     Difficulty of Paying Living Expenses: Not hard at all   Recent Concern: Financial Resource Strain - Medium Risk (8/25/2024)    Overall Financial Resource Strain (CARDIA)     Difficulty of Paying Living Expenses: Somewhat hard   Food Insecurity: No Food Insecurity (8/29/2024)    Hunger Vital Sign     Worried About Running Out of Food in the Last Year: Never true     Ran Out of Food in the Last Year: Never true   Recent Concern: Food Insecurity - Food Insecurity Present (8/25/2024)    Hunger Vital Sign     Worried About Running Out of Food in the Last Year: Often true     Ran Out of Food in the Last Year: Often true   Transportation Needs: No Transportation Needs (8/29/2024)    TRANSPORTATION NEEDS     Transportation : No   Physical Activity: Sufficiently Active (8/29/2024)    Exercise Vital Sign     Days of Exercise per Week:  5 days     Minutes of Exercise per Session: 30 min   Recent Concern: Physical Activity - Inactive (8/25/2024)    Exercise Vital Sign     Days of Exercise per Week: 0 days     Minutes of Exercise per Session: 0 min   Stress: No Stress Concern Present (8/29/2024)    [facility] - Occupational Stress Questionnaire     Feeling of Stress : Not at all   Recent Concern: Stress - Stress Concern Present (8/25/2024)    [facility] of Occupational Health - Occupational Stress Questionnaire     Feeling of Stress : Rather much   Housing Stability: Low Risk  (8/29/2024)    Housing Stability Vital Sign     Unable to Pay for Housing in the Last Year: No     Homeless in the Last Year: No   Recent Concern: Housing Stability - High Risk (8/25/2024)    Housing Stability Vital Sign     Unable to Pay for Housing in the Last Year: Yes     Homeless in the Last Year: No       Medications:  Scheduled Meds:   apixaban  5 mg Oral BID    busPIRone  10 mg Oral BID    droPERidol  1.25 mg Intravenous Once    epoetin teresa-ebpx (RETACRIT) injection  50 Units/kg Intravenous Every Tues, Thurs, Sat    famotidine (PF)  20 mg Intravenous Daily    FLUoxetine  40 mg Oral Daily    gabapentin  300 mg Oral BID    hydrALAZINE  50 mg Oral Q8H    insulin glargine U-100  10 Units Subcutaneous Daily    levETIRAcetam  500 mg Oral BID    metoclopramide  5 mg Intravenous Q6H    senna-docusate 8.6-50 mg  1 tablet Oral BID     Continuous Infusions:   D5 and 0.45% NaCl  125 mL/hr Intravenous Continuous PRN   Stopped at 08/29/24 0359     PRN Meds:.  Current Facility-Administered Medications:     acetaminophen, 650 mg, Oral, Q4H PRN    acetaminophen, 650 mg, Oral, Q4H PRN    aluminum-magnesium hydroxide-simethicone, 30 mL, Oral, QID PRN    D5 and 0.45% NaCl, 125 mL/hr, Intravenous, Continuous PRN    dextrose 50%, 12.5 g, Intravenous, PRN    dextrose 50%, 25 g, Intravenous, PRN    diphenhydrAMINE, 25 mg, Oral, Q6H PRN    glucagon (human  recombinant), 1 mg, Intramuscular, PRN    glucose, 16 g, Oral, PRN    glucose, 24 g, Oral, PRN    insulin aspart U-100, 0-10 Units, Subcutaneous, QID (AC + HS) PRN    magnesium oxide, 800 mg, Oral, PRN    magnesium oxide, 800 mg, Oral, PRN    melatonin, 6 mg, Oral, Nightly PRN    naloxone, 0.02 mg, Intravenous, PRN    ondansetron, 4 mg, Intravenous, Q6H PRN    ondansetron, 4 mg, Intravenous, Q6H PRN    potassium bicarbonate, 35 mEq, Oral, PRN    potassium bicarbonate, 50 mEq, Oral, PRN    potassium bicarbonate, 60 mEq, Oral, PRN    potassium, sodium phosphates, 2 packet, Oral, PRN    potassium, sodium phosphates, 2 packet, Oral, PRN    potassium, sodium phosphates, 2 packet, Oral, PRN    sodium chloride 0.9%, 10 mL, Intravenous, PRN    sodium chloride 0.9%, 10 mL, Intravenous, Q12H PRN  No current facility-administered medications on file prior to encounter.     Current Outpatient Medications on File Prior to Encounter   Medication Sig Dispense Refill    acetaZOLAMIDE (DIAMOX) 250 MG tablet take 1 tablet by mouth 3 times a day 90 tablet 3    albuterol (VENTOLIN HFA) 90 mcg/actuation inhaler Inhale 2 puffs every 4 hours by inhalation route. 8 g 2    apixaban (ELIQUIS) 5 mg Tab Take 1 tablet (5 mg total) by mouth 2 (two) times daily. Patient w/ thrombocytopenia <50, so held during admission, follow-up with hematologist about restarting 180 tablet 1    atropine 1% (ISOPTO ATROPINE) 1 % Drop Place 1 drop into the left eye 2 (two) times a day. 15 mL 3    blood sugar diagnostic (TRUE METRIX GLUCOSE TEST STRIP) Strp Use 1 strip to check blood glucose three times daily 100 each 11    blood-glucose meter (TRUE METRIX GLUCOSE METER) Misc Use to check blood glucose 1 each 0    blood-glucose meter,continuous (DEXCOM ) Misc USE WITH SENSORS 1 each 0    blood-glucose sensor (DEXCOM G7 SENSOR) Heather Use as directed for CGM. Change sensor every 10 days 1 each 11    blood-glucose sensor Heather CHANGE EVERY 10 DAYS 3 each 0     brimonidine 0.15 % OPTH DROP (ALPHAGAN) 0.15 % ophthalmic solution Place 1 drop into both eyes 3 (three) times daily. 15 mL 3    brinzolamide (AZOPT) 1 % ophthalmic suspension Place1 drop in left eye 3 times a day for 30 days 15 mL 6    busPIRone (BUSPAR) 10 MG tablet Take 1 tablet (10 mg total) by mouth 3 (three) times daily as needed (anxiety). 60 tablet 5    cetirizine (ZYRTEC) 10 MG tablet Take 1 tablet every day by oral route. 30 tablet 0    dorzolamide-timolol 2-0.5% (COSOPT) 22.3-6.8 mg/mL ophthalmic solution place 1 drop in left eye twice a day  for 30 days 10 mL 0    FLUoxetine 40 MG capsule Take 1 capsule every day by oral route. 30 capsule 2    fluticasone propionate (FLONASE ALLERGY RELIEF) 50 mcg/actuation nasal spray [place] 1 spray every day by intranasal route. 16 g 2    gabapentin (NEURONTIN) 300 MG capsule Take 2 capsules twice a day by oral route for 90 days. 360 capsule 1    hydrALAZINE (APRESOLINE) 25 MG tablet take 1 tablet by mouth every 8 hours 90 tablet 0    insulin aspart U-100 (NOVOLOG) 100 unit/mL (3 mL) InPn pen Inject 8 Units into the skin 3 (three) times daily with meals. 15 mL 0    insulin glargine U-100, Lantus, (LANTUS SOLOSTAR U-100 INSULIN) 100 unit/mL (3 mL) InPn pen Inject 22 units every day by subcutaneous route in the evening for 30 days, for diabetes. 15 mL 2    lancets (TRUEPLUS LANCETS) 33 gauge Misc Use 1 to check blood glucose three times daily 100 each 11    lancets 33 gauge Misc Use to test twice daily 100 each 5    levETIRAcetam (KEPPRA) 500 MG Tab Take 1 tablet (500 mg total) by mouth 2 (two) times daily. 60 tablet 11    LORazepam (ATIVAN) 0.5 MG tablet Take 1 tablet 3 times a day by oral route for 7 days, for severe panic. 21 tablet 2    ondansetron (ZOFRAN-ODT) 4 MG TbDL Place 1 tablet (4 mg) on or under the tongue every 8 hours for 10 days. 24 tablet 2    oxyCODONE-acetaminophen (PERCOCET)  mg per tablet Take 1 tablet by mouth every 4 (four) hours as needed for  "Pain. 42 tablet 0    pantoprazole (PROTONIX) 40 MG tablet Take 1 tablet (40 mg total) by mouth once daily. 30 tablet 0    pen needle, diabetic 31 gauge x 3/16" Ndle Use as directed with insulin once daily 100 each 0    prednisoLONE acetate (PRED FORTE) 1 % DrpS Place 1 drop into the left eye 2 (two) times daily. 5 mL 3    promethazine (PHENERGAN) 25 MG tablet Take 1 tablet (25 mg total) by mouth every 6 (six) hours as needed. 15 tablet 0    sevelamer carbonate (RENVELA) 800 mg Tab Take 1 tablet (800 mg total) by mouth 3 (three) times daily with meals. 180 tablet 11    sucralfate (CARAFATE) 1 gram tablet Take 1 tablet (1 g total) by mouth 4 (four) times daily. 100 tablet 1    timolol maleate 0.5% (TIMOPTIC) 0.5 % Drop Place 1 drop in left eye 2 times a day for 30 days 5 mL 6    [DISCONTINUED] atenoloL (TENORMIN) 50 MG tablet Take 1 tablet (50 mg total) by mouth every other day. 45 tablet 3    [DISCONTINUED] insulin detemir U-100, Levemir, (LEVEMIR FLEXTOUCH U100 INSULIN) 100 unit/mL (3 mL) InPn pen Inject 22 units every day by subcutaneous route in the evening for 90 days. 18 mL 1    [DISCONTINUED] omeprazole (PRILOSEC) 20 MG capsule Take 2 capsules (40 mg total) by mouth once daily. for 10 days 20 capsule 0       Allergies:  Droperidol, Haldol [haloperidol lactate], Penicillins, and Droperidol (bulk)    Past Family History:  Reviewed; refer to Hospitalist Admission Note    Review of Systems:  Review of Systems - All 14 systems reviewed and negative, except as noted in HPI    Physical Exam:  General Appearance:    NAD, AAO x 3, cooperative, appears stated age   Head:    Normocephalic, atraumatic   Eyes:    PER, EOMI, and conjunctiva/sclera clear bilaterally       Mouth:   Moist mucus membranes, no thrush or oral lesions,       normal dentition   Back:     No CVA tenderness   Lungs:     Clear to auscultation bilaterally, no wheezes, crackles,           rales or rhonchi, symmetric air movement, respirations unlabored "   Chest wall:    No tenderness or deformity   Heart:    Regular rate and rhythm, S1 and S2 normal, no murmur, rub   or gallop   Abdomen:     Soft, non-tender, non-distended, bowel sounds active all four   quadrants, no RT or guarding, no masses, no organomegaly   Extremities:   Warm and well perfused, distal pulses are intact, no             cyanosis or peripheral edema   MSK:   No joint or muscle swelling, tenderness or deformity   Skin:   Skin color, texture, turgor normal, no rashes or lesions   Neurologic/Psychiatric:   CNII-XII intact, normal strength and sensation       throughout, no asterixis; normal affect, memory, judgement     and insight      Results:  Lab Results   Component Value Date     08/30/2024    K 4.8 08/30/2024     08/30/2024    CO2 27 08/30/2024    BUN 30 (H) 08/30/2024    CREATININE 6.0 (H) 08/30/2024    CALCIUM 8.1 (L) 08/30/2024    ANIONGAP 13 08/30/2024    ESTGFRAFRICA 19 (L) 09/03/2022    EGFRNONAA 18 (A) 07/31/2022       Lab Results   Component Value Date    CALCIUM 8.1 (L) 08/30/2024    PHOS 5.3 (H) 08/30/2024       Recent Labs   Lab 08/30/24  0524   WBC 4.83   RBC 2.87*   HGB 8.3*   HCT 25.5*   *   MCV 89   MCH 28.9   MCHC 32.5       I have personally reviewed pertinent radiological imaging and reports from this admission.  Patient care was time spent personally by me on the following activities:   Obtaining a history  Examination of patient.  Providing medical care at the patients bedside.  Developing a treatment plan with patient or surrogate and bedside caregivers  Ordering and reviewing laboratory studies, radiographic studies, pulse oximetry.  Ordering and performing treatments and interventions.  Evaluation of patient's response to treatment.  Discussions with consultants while on the unit and immediately available to the patient.  Re-evaluation of the patient's condition.  Documentation in the medical record.       Hugh Figueroa MD MPH  [facility]  [address] [place] [address]  [phone] (p)  [phone] (f)

## 2024-08-30 NOTE — NURSING
Nurses Note -- 4 Eyes      8/29/2024   7:17 PM      Skin assessed during: Transfer      [x] No Altered Skin Integrity Present    [x]Prevention Measures Documented      [] Yes- Altered Skin Integrity Present or Discovered   [] LDA Added if Not in Epic (Describe Wound)   [] New Altered Skin Integrity was Present on Admit and Documented in LDA   [] Wound Image Taken    Wound Care Consulted? No    Attending Nurse:  Lanette Woo RN/Staff Member:  Natali

## 2024-08-31 VITALS
BODY MASS INDEX: 21.53 KG/M2 | OXYGEN SATURATION: 98 % | HEIGHT: 62 IN | WEIGHT: 117 LBS | HEART RATE: 85 BPM | TEMPERATURE: 98 F | SYSTOLIC BLOOD PRESSURE: 164 MMHG | DIASTOLIC BLOOD PRESSURE: 92 MMHG | RESPIRATION RATE: 16 BRPM

## 2024-08-31 LAB
ALBUMIN SERPL BCP-MCNC: 3.1 G/DL (ref 3.5–5.2)
ALP SERPL-CCNC: 74 U/L (ref 55–135)
ALT SERPL W/O P-5'-P-CCNC: 7 U/L (ref 10–44)
ANION GAP SERPL CALC-SCNC: 11 MMOL/L (ref 8–16)
AST SERPL-CCNC: 7 U/L (ref 10–40)
BASOPHILS # BLD AUTO: 0.01 K/UL (ref 0–0.2)
BASOPHILS NFR BLD: 0.2 % (ref 0–1.9)
BILIRUB SERPL-MCNC: 0.5 MG/DL (ref 0.1–1)
BUN SERPL-MCNC: 44 MG/DL (ref 6–20)
CALCIUM SERPL-MCNC: 8.1 MG/DL (ref 8.7–10.5)
CHLORIDE SERPL-SCNC: 99 MMOL/L (ref 95–110)
CO2 SERPL-SCNC: 26 MMOL/L (ref 23–29)
CREAT SERPL-MCNC: 7.9 MG/DL (ref 0.5–1.4)
DIFFERENTIAL METHOD BLD: ABNORMAL
EOSINOPHIL # BLD AUTO: 0.1 K/UL (ref 0–0.5)
EOSINOPHIL NFR BLD: 2.8 % (ref 0–8)
ERYTHROCYTE [DISTWIDTH] IN BLOOD BY AUTOMATED COUNT: 16.6 % (ref 11.5–14.5)
EST. GFR  (NO RACE VARIABLE): 6.3 ML/MIN/1.73 M^2
GLUCOSE SERPL-MCNC: 387 MG/DL (ref 70–110)
GLUCOSE SERPL-MCNC: 389 MG/DL (ref 70–110)
HCT VFR BLD AUTO: 25.7 % (ref 37–48.5)
HGB BLD-MCNC: 8.4 G/DL (ref 12–16)
IMM GRANULOCYTES # BLD AUTO: 0.02 K/UL (ref 0–0.04)
IMM GRANULOCYTES NFR BLD AUTO: 0.5 % (ref 0–0.5)
LYMPHOCYTES # BLD AUTO: 0.6 K/UL (ref 1–4.8)
LYMPHOCYTES NFR BLD: 14.6 % (ref 18–48)
MAGNESIUM SERPL-MCNC: 2.2 MG/DL (ref 1.6–2.6)
MCH RBC QN AUTO: 29.3 PG (ref 27–31)
MCHC RBC AUTO-ENTMCNC: 32.7 G/DL (ref 32–36)
MCV RBC AUTO: 90 FL (ref 82–98)
MONOCYTES # BLD AUTO: 0.6 K/UL (ref 0.3–1)
MONOCYTES NFR BLD: 13.9 % (ref 4–15)
NEUTROPHILS # BLD AUTO: 2.9 K/UL (ref 1.8–7.7)
NEUTROPHILS NFR BLD: 68 % (ref 38–73)
NRBC BLD-RTO: 0 /100 WBC
PHOSPHATE SERPL-MCNC: 6.4 MG/DL (ref 2.7–4.5)
PLATELET # BLD AUTO: 135 K/UL (ref 150–450)
PMV BLD AUTO: 10.8 FL (ref 9.2–12.9)
POTASSIUM SERPL-SCNC: 5.3 MMOL/L (ref 3.5–5.1)
PROT SERPL-MCNC: 6 G/DL (ref 6–8.4)
RBC # BLD AUTO: 2.87 M/UL (ref 4–5.4)
SODIUM SERPL-SCNC: 136 MMOL/L (ref 136–145)
WBC # BLD AUTO: 4.24 K/UL (ref 3.9–12.7)

## 2024-08-31 PROCEDURE — 63600175 PHARM REV CODE 636 W HCPCS: Performed by: INTERNAL MEDICINE

## 2024-08-31 PROCEDURE — 90935 HEMODIALYSIS ONE EVALUATION: CPT

## 2024-08-31 PROCEDURE — 85025 COMPLETE CBC W/AUTO DIFF WBC: CPT | Performed by: INTERNAL MEDICINE

## 2024-08-31 PROCEDURE — 27000221 HC OXYGEN, UP TO 24 HOURS

## 2024-08-31 PROCEDURE — 63600175 PHARM REV CODE 636 W HCPCS: Mod: JZ,JG | Performed by: INTERNAL MEDICINE

## 2024-08-31 PROCEDURE — 25000003 PHARM REV CODE 250: Performed by: INTERNAL MEDICINE

## 2024-08-31 PROCEDURE — G0378 HOSPITAL OBSERVATION PER HR: HCPCS

## 2024-08-31 PROCEDURE — G0257 UNSCHED DIALYSIS ESRD PT HOS: HCPCS

## 2024-08-31 PROCEDURE — 80053 COMPREHEN METABOLIC PANEL: CPT | Performed by: INTERNAL MEDICINE

## 2024-08-31 PROCEDURE — 83735 ASSAY OF MAGNESIUM: CPT | Performed by: INTERNAL MEDICINE

## 2024-08-31 PROCEDURE — 84100 ASSAY OF PHOSPHORUS: CPT | Performed by: INTERNAL MEDICINE

## 2024-08-31 PROCEDURE — 99900031 HC PATIENT EDUCATION (STAT)

## 2024-08-31 PROCEDURE — 94761 N-INVAS EAR/PLS OXIMETRY MLT: CPT

## 2024-08-31 PROCEDURE — 36415 COLL VENOUS BLD VENIPUNCTURE: CPT | Performed by: INTERNAL MEDICINE

## 2024-08-31 PROCEDURE — 96376 TX/PRO/DX INJ SAME DRUG ADON: CPT | Mod: 59

## 2024-08-31 RX ORDER — DEXTROSE 4 G
TABLET,CHEWABLE ORAL
Qty: 1 EACH | Refills: 0 | Status: SHIPPED | OUTPATIENT
Start: 2024-08-31

## 2024-08-31 RX ORDER — BLOOD-GLUCOSE SENSOR
EACH MISCELLANEOUS
Qty: 3 EACH | Refills: 0 | Status: SHIPPED | OUTPATIENT
Start: 2024-08-31

## 2024-08-31 RX ORDER — HYDRALAZINE HYDROCHLORIDE 50 MG/1
50 TABLET, FILM COATED ORAL EVERY 8 HOURS
Qty: 90 TABLET | Refills: 0 | Status: SHIPPED | OUTPATIENT
Start: 2024-08-31 | End: 2024-10-04

## 2024-08-31 RX ORDER — INSULIN GLARGINE 100 [IU]/ML
20 INJECTION, SOLUTION SUBCUTANEOUS DAILY
Status: DISCONTINUED | OUTPATIENT
Start: 2024-08-31 | End: 2024-08-31 | Stop reason: HOSPADM

## 2024-08-31 RX ADMIN — GABAPENTIN 300 MG: 300 CAPSULE ORAL at 09:08

## 2024-08-31 RX ADMIN — BUSPIRONE HYDROCHLORIDE 10 MG: 5 TABLET ORAL at 09:08

## 2024-08-31 RX ADMIN — SENNOSIDES AND DOCUSATE SODIUM 1 TABLET: 50; 8.6 TABLET ORAL at 09:08

## 2024-08-31 RX ADMIN — LEVETIRACETAM 500 MG: 500 TABLET, FILM COATED ORAL at 09:08

## 2024-08-31 RX ADMIN — FLUOXETINE HYDROCHLORIDE 40 MG: 20 CAPSULE ORAL at 09:08

## 2024-08-31 RX ADMIN — INSULIN GLARGINE 20 UNITS: 100 INJECTION, SOLUTION SUBCUTANEOUS at 09:08

## 2024-08-31 RX ADMIN — EPOETIN ALFA-EPBX 2700 UNITS: 10000 INJECTION, SOLUTION INTRAVENOUS; SUBCUTANEOUS at 11:08

## 2024-08-31 RX ADMIN — HYDRALAZINE HYDROCHLORIDE 50 MG: 25 TABLET ORAL at 05:08

## 2024-08-31 RX ADMIN — METOCLOPRAMIDE HYDROCHLORIDE 5 MG: 5 INJECTION INTRAMUSCULAR; INTRAVENOUS at 05:08

## 2024-08-31 RX ADMIN — APIXABAN 5 MG: 5 TABLET, FILM COATED ORAL at 09:08

## 2024-08-31 RX ADMIN — HYDROCODONE BITARTRATE AND ACETAMINOPHEN 1 TABLET: 10; 325 TABLET ORAL at 02:08

## 2024-08-31 NOTE — PROGRESS NOTES
INPATIENT NEPHROLOGY Progress Note  [facility]    Patient Name: Tabby Howard  Date: 08/31/2024    Reason for consultation: ESRD    Chief Complaint:   Chief Complaint   Patient presents with    Abdominal Pain     Pt complains of severe lower quad ABD pain accompanied by bilateral kidney pain. Pt was seen at [facility] and was told she needed dialysis. Pt was unable to receive dialysis due to pain and was sent to ED for further eval.       History of Present Illness:  35 y/o F with ESRD on HD TTS, type I DM on insulin, and HTN who p/w DKA, consulted for dialysis.  Reports abd pain, inability tolerate PO intake, unable to attend HD as a result. Wasn't taking insulin or eating at home. Went to another hospital yest- told it was gastroparesis flare and sent home. She went to HD but was so miserable they sent her directly to [facility]. She is on the DKA pathway. Started on cardene gtt for high BP as well.    Interval History:  8/29- BP better, on 2L NC, off insulin and cardene gtts, still with abd pain, doesn't have much fluid on her, not eating  8/30  AFVSS, tolerated 3 liter ultrafiltration with hd yesterday  8/31 seen on HD     Plan of Care:    Assessment:  DKA   ESRD on HD TTS  HTN urgency  SHPT  Anemia of CKD    Plan:    - DKA resolved  - HD TTS  - off cardene gtt- resumed home BP meds- ordered 1-3L UF with hd as tolerated  - renal diet, 1.5L fluid restriction  - binder with meals  - SHELLEY with HD    Thank you for allowing us to participate in this patient's care. We will continue to follow.    Vital Signs:  Temp Readings from Last 3 Encounters:   08/31/24 98.4 °F (36.9 °C) (Oral)   08/28/24 98.4 °F (36.9 °C) (Oral)   08/26/24 97.7 °F (36.5 °C) (Oral)       Pulse Readings from Last 3 Encounters:   08/31/24 85   08/28/24 97   08/26/24 82       BP Readings from Last 3 Encounters:   08/31/24 (!) 168/77   08/28/24 (!) 167/69   08/26/24 (!) 158/90       Weight:  Wt Readings from Last 3 Encounters:   08/28/24  53.1 kg (117 lb)   08/28/24 53.1 kg (117 lb)   08/25/24 51 kg (112 lb 7 oz)       Medications:  Scheduled Meds:   apixaban  5 mg Oral BID    busPIRone  10 mg Oral BID    droPERidol  1.25 mg Intravenous Once    epoetin teresa-ebpx (RETACRIT) injection  50 Units/kg Intravenous Every Tues, Thurs, Sat    famotidine (PF)  20 mg Intravenous Daily    FLUoxetine  40 mg Oral Daily    gabapentin  300 mg Oral BID    hydrALAZINE  50 mg Oral Q8H    insulin glargine U-100  20 Units Subcutaneous Daily    levETIRAcetam  500 mg Oral BID    metoclopramide  5 mg Intravenous Q6H    senna-docusate 8.6-50 mg  1 tablet Oral BID     Continuous Infusions:   D5 and 0.45% NaCl  125 mL/hr Intravenous Continuous PRN   Stopped at 08/29/24 0359     PRN Meds:.  Current Facility-Administered Medications:     acetaminophen, 650 mg, Oral, Q4H PRN    acetaminophen, 650 mg, Oral, Q4H PRN    aluminum-magnesium hydroxide-simethicone, 30 mL, Oral, QID PRN    D5 and 0.45% NaCl, 125 mL/hr, Intravenous, Continuous PRN    dextrose 50%, 12.5 g, Intravenous, PRN    dextrose 50%, 25 g, Intravenous, PRN    diphenhydrAMINE, 25 mg, Oral, Q6H PRN    glucagon (human recombinant), 1 mg, Intramuscular, PRN    glucose, 16 g, Oral, PRN    glucose, 24 g, Oral, PRN    HYDROcodone-acetaminophen, 1 tablet, Oral, Q6H PRN    HYDROmorphone, 0.5 mg, Intravenous, Q6H PRN    insulin aspart U-100, 0-10 Units, Subcutaneous, QID (AC + HS) PRN    magnesium oxide, 800 mg, Oral, PRN    magnesium oxide, 800 mg, Oral, PRN    melatonin, 6 mg, Oral, Nightly PRN    naloxone, 0.02 mg, Intravenous, PRN    ondansetron, 4 mg, Intravenous, Q6H PRN    ondansetron, 4 mg, Intravenous, Q6H PRN    potassium bicarbonate, 35 mEq, Oral, PRN    potassium bicarbonate, 50 mEq, Oral, PRN    potassium bicarbonate, 60 mEq, Oral, PRN    potassium, sodium phosphates, 2 packet, Oral, PRN    potassium, sodium phosphates, 2 packet, Oral, PRN    potassium, sodium phosphates, 2 packet, Oral, PRN    sodium chloride  0.9%, 10 mL, Intravenous, PRN    sodium chloride 0.9%, 10 mL, Intravenous, Q12H PRN  No current facility-administered medications on file prior to encounter.     Current Outpatient Medications on File Prior to Encounter   Medication Sig Dispense Refill    acetaZOLAMIDE (DIAMOX) 250 MG tablet take 1 tablet by mouth 3 times a day 90 tablet 3    albuterol (VENTOLIN HFA) 90 mcg/actuation inhaler Inhale 2 puffs every 4 hours by inhalation route. 8 g 2    apixaban (ELIQUIS) 5 mg Tab Take 1 tablet (5 mg total) by mouth 2 (two) times daily. Patient w/ thrombocytopenia <50, so held during admission, follow-up with hematologist about restarting 180 tablet 1    atropine 1% (ISOPTO ATROPINE) 1 % Drop Place 1 drop into the left eye 2 (two) times a day. 15 mL 3    blood sugar diagnostic (TRUE METRIX GLUCOSE TEST STRIP) Strp Use 1 strip to check blood glucose three times daily 100 each 11    blood-glucose meter (TRUE METRIX GLUCOSE METER) Misc Use to check blood glucose 1 each 0    blood-glucose meter,continuous (DEXCOM ) Misc USE WITH SENSORS 1 each 0    blood-glucose sensor (DEXCOM G7 SENSOR) Heather Use as directed for CGM. Change sensor every 10 days 1 each 11    blood-glucose sensor Heather CHANGE EVERY 10 DAYS 3 each 0    brimonidine 0.15 % OPTH DROP (ALPHAGAN) 0.15 % ophthalmic solution Place 1 drop into both eyes 3 (three) times daily. 15 mL 3    brinzolamide (AZOPT) 1 % ophthalmic suspension Place1 drop in left eye 3 times a day for 30 days 15 mL 6    busPIRone (BUSPAR) 10 MG tablet Take 1 tablet (10 mg total) by mouth 3 (three) times daily as needed (anxiety). 60 tablet 5    cetirizine (ZYRTEC) 10 MG tablet Take 1 tablet every day by oral route. 30 tablet 0    dorzolamide-timolol 2-0.5% (COSOPT) 22.3-6.8 mg/mL ophthalmic solution place 1 drop in left eye twice a day  for 30 days 10 mL 0    FLUoxetine 40 MG capsule Take 1 capsule every day by oral route. 30 capsule 2    fluticasone propionate (FLONASE ALLERGY RELIEF) 50  "mcg/actuation nasal spray Spray 1 spray every day by intranasal route. 16 g 2    gabapentin (NEURONTIN) 300 MG capsule Take 2 capsules twice a day by oral route for 90 days. 360 capsule 1    hydrALAZINE (APRESOLINE) 25 MG tablet take 1 tablet by mouth every 8 hours 90 tablet 0    insulin aspart U-100 (NOVOLOG) 100 unit/mL (3 mL) InPn pen Inject 8 Units into the skin 3 (three) times daily with meals. 15 mL 0    insulin glargine U-100, Lantus, (LANTUS SOLOSTAR U-100 INSULIN) 100 unit/mL (3 mL) InPn pen Inject 22 units every day by subcutaneous route in the evening for 30 days, for diabetes. 15 mL 2    lancets (TRUEPLUS LANCETS) 33 gauge Misc Use 1 to check blood glucose three times daily 100 each 11    lancets 33 gauge Misc Use to test twice daily 100 each 5    levETIRAcetam (KEPPRA) 500 MG Tab Take 1 tablet (500 mg total) by mouth 2 (two) times daily. 60 tablet 11    LORazepam (ATIVAN) 0.5 MG tablet Take 1 tablet 3 times a day by oral route for 7 days, for severe panic. 21 tablet 2    ondansetron (ZOFRAN-ODT) 4 MG TbDL Place 1 tablet (4 mg) on or under the tongue every 8 hours for 10 days. 24 tablet 2    oxyCODONE-acetaminophen (PERCOCET)  mg per tablet Take 1 tablet by mouth every 4 (four) hours as needed for Pain. 42 tablet 0    pantoprazole (PROTONIX) 40 MG tablet Take 1 tablet (40 mg total) by mouth once daily. 30 tablet 0    pen needle, diabetic 31 gauge x 3/16" Ndle Use as directed with insulin once daily 100 each 0    prednisoLONE acetate (PRED FORTE) 1 % DrpS Place 1 drop into the left eye 2 (two) times daily. 5 mL 3    promethazine (PHENERGAN) 25 MG tablet Take 1 tablet (25 mg total) by mouth every 6 (six) hours as needed. 15 tablet 0    sevelamer carbonate (RENVELA) 800 mg Tab Take 1 tablet (800 mg total) by mouth 3 (three) times daily with meals. 180 tablet 11    sucralfate (CARAFATE) 1 gram tablet Take 1 tablet (1 g total) by mouth 4 (four) times daily. 100 tablet 1    timolol maleate 0.5% " (TIMOPTIC) 0.5 % Drop Place 1 drop in left eye 2 times a day for 30 days 5 mL 6    [DISCONTINUED] atenoloL (TENORMIN) 50 MG tablet Take 1 tablet (50 mg total) by mouth every other day. 45 tablet 3    [DISCONTINUED] insulin detemir U-100, Levemir, (LEVEMIR FLEXTOUCH U100 INSULIN) 100 unit/mL (3 mL) InPn pen Inject 22 units every day by subcutaneous route in the evening for 90 days. 18 mL 1    [DISCONTINUED] omeprazole (PRILOSEC) 20 MG capsule Take 2 capsules (40 mg total) by mouth once daily. for 10 days 20 capsule 0     Review of Systems:  Neg    Physical Exam:  General Appearance:    NAD, AAO x 3, cooperative, appears stated age   Head:    Normocephalic, atraumatic   Eyes:    PER, EOMI, and conjunctiva/sclera clear bilaterally       Mouth:   Moist mucus membranes, no thrush or oral lesions,       normal dentition   Back:     No CVA tenderness   Lungs:     Clear to auscultation bilaterally, no wheezes, crackles,           rales or rhonchi, symmetric air movement, respirations unlabored   Chest wall:    No tenderness or deformity   Heart:    Regular rate and rhythm, S1 and S2 normal, no murmur, rub   or gallop   Abdomen:     Soft, non-tender, non-distended, bowel sounds active all four   quadrants, no RT or guarding, no masses, no organomegaly   Extremities:   Warm and well perfused, distal pulses are intact, no             cyanosis or peripheral edema   MSK:   No joint or muscle swelling, tenderness or deformity   Skin:   Skin color, texture, turgor normal, no rashes or lesions   Neurologic/Psychiatric:   CNII-XII intact, normal strength and sensation       throughout, no asterixis; normal affect, memory, judgement     and insight      Results:  Lab Results   Component Value Date     08/31/2024    K 5.3 (H) 08/31/2024    CL 99 08/31/2024    CO2 26 08/31/2024    BUN 44 (H) 08/31/2024    CREATININE 7.9 (H) 08/31/2024    CALCIUM 8.1 (L) 08/31/2024    ANIONGAP 11 08/31/2024    ESTGFRAFRICA 19 (L) 09/03/2022     EGFRNONAA 18 (A) 07/31/2022       Lab Results   Component Value Date    CALCIUM 8.1 (L) 08/31/2024    PHOS 6.4 (H) 08/31/2024       Recent Labs   Lab 08/31/24  0801   WBC 4.24   RBC 2.87*   HGB 8.4*   HCT 25.7*   *   MCV 90   MCH 29.3   MCHC 32.7       I have personally reviewed pertinent radiological imaging and reports from this admission.  Patient care was time spent personally by me on the following activities: > 35 min  Obtaining a history  Examination of patient.  Providing medical care at the patients bedside.  Developing a treatment plan with patient or surrogate and bedside caregivers  Ordering and reviewing laboratory studies, radiographic studies, pulse oximetry.  Ordering and performing treatments and interventions.  Evaluation of patient's response to treatment.  Discussions with consultants while on the unit and immediately available to the patient.  Re-evaluation of the patient's condition.  Documentation in the medical record.       Prateek Clark MD MPH  [facility] [address] [address]  [phone] (p)  [phone] (f)

## 2024-08-31 NOTE — PROGRESS NOTES
08/31/24 1250   Vital Signs   Temp 98 °F (36.7 °C)   Pulse 85   Resp 16   SpO2 98 %   BP (!) 164/92   Post-Hemodialysis Assessment   Rinseback Volume (mL) 250 mL   Blood Volume Processed (Liters) 65.1 L   Dialyzer Clearance Lightly streaked   Duration of Treatment 180 minutes   Total UF (mL) 3500 mL   Net Fluid Removal 3000   Patient Response to Treatment tolerated well   Post-Treatment Weight 49.8 kg (109 lb 12.6 oz)   Treatment Weight Change -3.3   Arterial bleeding stop time (min) 5 min   Venous bleeding stop time (min) 5 min   Post-Hemodialysis Comments no problems     Pt educated on fluid intake. Report given to Keaton LINTON

## 2024-08-31 NOTE — PLAN OF CARE
Problem: Violence Risk or Actual  Goal: Anger and Impulse Control  Outcome: Met     Problem: Diabetic Ketoacidosis  Goal: Optimal Coping  Outcome: Met  Goal: Fluid and Electrolyte Balance with Absence of Ketosis  Outcome: Met     Problem: Adult Inpatient Plan of Care  Goal: Plan of Care Review  Outcome: Met  Goal: Patient-Specific Goal (Individualized)  Outcome: Met  Goal: Absence of Hospital-Acquired Illness or Injury  Outcome: Met  Goal: Optimal Comfort and Wellbeing  Outcome: Met  Goal: Readiness for Transition of Care  Outcome: Met     Problem: Diabetes Comorbidity  Goal: Blood Glucose Level Within Targeted Range  Outcome: Met     Problem: Hemodialysis  Goal: Safe, Effective Therapy Delivery  Outcome: Met  Goal: Effective Tissue Perfusion  Outcome: Met  Goal: Absence of Infection Signs and Symptoms  Outcome: Met

## 2024-08-31 NOTE — DISCHARGE SUMMARY
[facility] Medicine  Discharge Summary      Patient Name: Tabby Howard  MRN: [id number]  [facility]: [id number]  Patient Class: OP- Observation  Admission Date: 8/28/2024  Hospital Length of Stay: 0 days  Discharge Date and Time:  08/31/2024 12:02 PM  Attending Physician: Mohini Yusuf MD   Discharging Provider: Mohini Yusuf MD  Primary Care Provider: Melony Kim NP    Primary Care Team: Networked reference to record PCT     HPI:   Tabby Howard is a 34 y.o. female with PMH as below who presents to the [facility] emergency department for evaluation of acute on chronic generalized abdominal pain, worse in the left flank, identical to prior episodes diagnosed as gastroparesis. Patient has ESRD and is on HD TThSa but missed last session due to transportation difficulties.        She has a PMH of DM on insulin , and ESRD on HD and HTN.      She was here this month for DKA and sent home but said she never really felt better and ended up being sent back here for DKA again and was just discharged few days ago for that.  That was 8/26 8/26 DC Summary    Hospital Course:   34F p/w 10/10 abdominal pain, located diffusely throughout, with associated vomiting. Patient reports that this level of pain is not typical for her gastroparesis. She reports she is unable to manage this discomfort at home. She presented for this discomfort to the [facility] ED on 8/22/24 PM and was discharged early 8/23/24 AM; she reported that she never truly felt better, but she did go to HD on 8/24/24, and then decided to seek medical attention at the [facility] ED thereafter. She does not believe that she had additional fluid removed during the 8/24/24 HD session.   Patient admitted with DKA. Off pathway. Transitioned to SSI. Advanced to clear liquid diet.. SSI started. Resume lantus if tolerating oral diet. Very brittle type 1 diabetic. Dropped glucose on insulin drip to 50-60s. Infusion shut off and  transferred to med surg floor.  In the med surg her sugars and other labs were well controlled.  Just feels mild low back pain but other than that feels back to her baseline.  Able to be discharged home and per pharmacy check it appears she has not had any mealtime insulin feel even though she says she takes it every day in addition to her Lantus.  I will discharge her home with a refill for her mealtime insulin as her sugars are well controlled here on her normal outpatient regimen, she just needs to take it consistently and he consistent meals.           She tells me that she never checked her sugars since going home from here on the 26th because her glucose monitor , dexcom I think , was not working or she needed a piece to it she said.   So because of that she was NOT taking any insulin she said since being home and was not eating.  Said she didn't want to eat     Then started getting nausea vomiting and abdominal pains again . Same as before.     So she went to [facility].   They said its gastroparesis and sent her home    Her labs there did show hyperglycemia and Metabolic acidosis with Anion gap . However no ketones checked.  No BHB checked so I can't say if she was in DKA then or not      But she came here next to our ER      DKA in our ER was identified quickly and Insulin drip started and IVFs    Her BP was very high also so they did Cardene drip        Plan was to admit to ICU For DKA    * No surgery found *      Hospital Course:   The patient was admitted with DKA on insulin drip and glucose was controlled. Insulin drip was stopped and SSI was started. Her abdominal pain improved. Her glucose remained somewhat high, and this was monitored. Her blood pressure was elevated, and this improved. Patient was discharged recently after treated for DKA and did not take her insulin since going home. During exam, patient is not interested in talking. Advised her about the compliance but she keeps her self under the  covers. New glucometer sent per CM.      Goals of Care Treatment Preferences:  Code Status: Full Code    Health care agent: Step-Mother Tanya Howard / Father Garcia Howard  Health care agent number: [phone] / [phone]               Physical Exam  Vitals reviewed.   Constitutional:       General: She is not in acute distress.     Appearance: Normal appearance. She is normal weight. She is not ill-appearing.   HENT:      Head: Normocephalic and atraumatic.      Nose: Nose normal.      Mouth/Throat:      Mouth: Mucous membranes are moist.      Pharynx: Oropharynx is clear.   Eyes:      General: No scleral icterus.     Extraocular Movements: Extraocular movements intact.      Conjunctiva/sclera: Conjunctivae normal.      Pupils: Pupils are equal, round, and reactive to light.   Cardiovascular:      Rate and Rhythm: Normal rate and regular rhythm.      Pulses: Normal pulses.      Heart sounds: Normal heart sounds. No murmur heard.     No gallop.   Pulmonary:      Effort: Pulmonary effort is normal. No respiratory distress.      Breath sounds: Normal breath sounds. No wheezing, rhonchi or rales.   Chest:      Chest wall: No tenderness.   Abdominal:      General: Abdomen is flat. There is no distension.      Palpations: Abdomen is soft.   Musculoskeletal:         General: No swelling or tenderness.      Cervical back: Normal range of motion and neck supple. No rigidity or tenderness.      Right lower leg: No edema.      Left lower leg: No edema.   Skin:     General: Skin is warm and dry.   Neurological:      General: No focal deficit present.      Mental Status: She is alert and oriented to person, place, and time. Mental status is at baseline.      Motor: No weakness.   Psychiatric:         Mood and Affect: Mood withdrawn. Not interested in conversation     SDOH Screening:  The patient was screened for utility difficulties, food insecurity, transport difficulties, housing insecurity, and interpersonal safety  and there were no concerns identified this admission.     Consults:   Consults (From admission, onward)          Status Ordering Provider     Inpatient consult to Nephrology  Once        Provider:  Hugh Figueroa MD    Completed TIFFANIE GARNICA     Inpatient consult to Registered Dietitian/Nutritionist  Once        Provider:  (Not yet assigned)    Completed TIFFANIE GARNICA            Cardiac/Vascular  Uncontrolled hypertension  Resume home meds   Hydralazine increased to 50 mg TID     Renal/  ESRD (end stage renal disease)  Dialysis per her routine     Endocrine  * DKA (diabetic ketoacidosis)  Resolved   Patient hyperglycemic but she was only receiving 10 units Lantus, home dose 22, home dose resumed on discharge  Patient non compliant   New glucometer and strips sent       Final Active Diagnoses:    Diagnosis Date Noted POA    PRINCIPAL PROBLEM:  DKA (diabetic ketoacidosis) [E11.10] 08/28/2024 Yes    Uncontrolled hypertension [I10] 07/30/2022 Yes    ESRD (end stage renal disease) [N18.6] 07/22/2022 Yes      Problems Resolved During this Admission:       Discharged Condition: good    Disposition: Home or Self Care    Follow Up:   Follow-up Information       [facility]. Go on 9/10/2024.    Specialty: Primary Care  Why: Hospital follow up scheduled at 1:00 p.m. on 9/10.  Contact information:  [address], [address] [address] [phone]  Additional information:  [facility] [address]                         Patient Instructions:   No discharge procedures on file.    Significant Diagnostic Studies: Labs: CMP   Recent Labs   Lab 08/30/24  0524 08/31/24  0801    136   K 4.8 5.3*    99   CO2 27 26   * 387*   BUN 30* 44*   CREATININE 6.0* 7.9*   CALCIUM 8.1* 8.1*   PROT 6.3 6.0   ALBUMIN 3.4* 3.1*   BILITOT 1.1* 0.5   ALKPHOS 87 74   AST 11 7*   ALT 10 7*   ANIONGAP 13 11    and CBC   Recent Labs   Lab 08/30/24  0524 08/31/24  0801   WBC 4.83 4.24   HGB 8.3* 8.4*   HCT 25.5*  "25.7*   * 135*       Pending Diagnostic Studies:       None           Medications:  Reconciled Home Medications:      Medication List        CHANGE how you take these medications      hydrALAZINE 50 MG tablet  Commonly known as: APRESOLINE  Take 1 tablet (50 mg total) by mouth every 8 (eight) hours.  What changed:   medication strength  how much to take  how to take this  when to take this     TRUEPLUS LANCETS 33 gauge Misc  Generic drug: lancets  Use 1 to check blood glucose three times daily  What changed: Another medication with the same name was removed. Continue taking this medication, and follow the directions you see here.            CONTINUE taking these medications      acetaZOLAMIDE 250 MG tablet  Commonly known as: DIAMOX  take 1 tablet by mouth 3 times a day     ALPHAGAN P 0.15 % ophthalmic solution  Generic drug: brimonidine 0.15 % OPTH DROP  Place 1 drop into both eyes 3 (three) times daily.     apixaban 5 mg Tab  Commonly known as: ELIQUIS  Take 1 tablet (5 mg total) by mouth 2 (two) times daily. Patient w/ thrombocytopenia <50, so held during admission, follow-up with hematologist about restarting     atropine 1% 1 % Drop  Commonly known as: ISOPTO ATROPINE  Place 1 drop into the left eye 2 (two) times a day.     BD ULTRA-FINE MINI PEN NEEDLE 31 gauge x 3/16" Ndle  Generic drug: pen needle, diabetic  Use as directed with insulin once daily     blood-glucose meter Misc  Commonly known as: TRUE METRIX GLUCOSE METER  Use to check blood glucose     blood-glucose meter,continuous Misc  USE WITH SENSORS     brinzolamide 1 % ophthalmic suspension  Commonly known as: AZOPT  Place1 drop in left eye 3 times a day for 30 days     busPIRone 10 MG tablet  Commonly known as: BUSPAR  Take 1 tablet (10 mg total) by mouth 3 (three) times daily as needed (anxiety).     cetirizine 10 MG tablet  Commonly known as: ZYRTEC  Take 1 tablet every day by oral route.     * DEXCOM G7 SENSOR Heather  Generic drug: " blood-glucose sensor  CHANGE EVERY 10 DAYS     * DEXCOM G7 SENSOR Heather  Generic drug: blood-glucose sensor  Use as directed for CGM. Change sensor every 10 days     dorzolamide-timolol 2-0.5% 22.3-6.8 mg/mL ophthalmic solution  Commonly known as: COSOPT  place 1 drop in left eye twice a day  for 30 days     FLUoxetine 40 MG capsule  Take 1 capsule every day by oral route.     fluticasone propionate 50 mcg/actuation nasal spray  Commonly known as: FLONASE ALLERGY RELIEF  Spray 1 spray every day by intranasal route.     gabapentin 300 MG capsule  Commonly known as: NEURONTIN  Take 2 capsules twice a day by oral route for 90 days.     insulin aspart U-100 100 unit/mL (3 mL) Inpn pen  Commonly known as: NovoLOG  Inject 8 Units into the skin 3 (three) times daily with meals.     LANTUS SOLOSTAR U-100 INSULIN 100 unit/mL (3 mL) Inpn pen  Generic drug: insulin glargine U-100 (Lantus)  Inject 22 units every day by subcutaneous route in the evening for 30 days, for diabetes.     levETIRAcetam 500 MG Tab  Commonly known as: KEPPRA  Take 1 tablet (500 mg total) by mouth 2 (two) times daily.     LORazepam 0.5 MG tablet  Commonly known as: ATIVAN  Take 1 tablet 3 times a day by oral route for 7 days, for severe panic.     ondansetron 4 MG Tbdl  Commonly known as: ZOFRAN-ODT  Place 1 tablet (4 mg) on or under the tongue every 8 hours for 10 days.     oxyCODONE-acetaminophen  mg per tablet  Commonly known as: PERCOCET  Take 1 tablet by mouth every 4 (four) hours as needed for Pain.     pantoprazole 40 MG tablet  Commonly known as: PROTONIX  Take 1 tablet (40 mg total) by mouth once daily.     prednisoLONE acetate 1 % Drps  Commonly known as: PRED FORTE  Place 1 drop into the left eye 2 (two) times daily.     promethazine 25 MG tablet  Commonly known as: PHENERGAN  Take 1 tablet (25 mg total) by mouth every 6 (six) hours as needed.     RENVELA 800 mg Tab  Generic drug: sevelamer carbonate  Take 1 tablet (800 mg total) by  mouth 3 (three) times daily with meals.     sucralfate 1 gram tablet  Commonly known as: CARAFATE  Take 1 tablet (1 g total) by mouth 4 (four) times daily.     timolol maleate 0.5% 0.5 % Drop  Commonly known as: TIMOPTIC  Place 1 drop in left eye 2 times a day for 30 days     TRUE METRIX GLUCOSE TEST STRIP Strp  Generic drug: blood sugar diagnostic  Use 1 strip to check blood glucose three times daily     VENTOLIN HFA 90 mcg/actuation inhaler  Generic drug: albuterol  Inhale 2 puffs every 4 hours by inhalation route.           * This list has 2 medication(s) that are the same as other medications prescribed for you. Read the directions carefully, and ask your doctor or other care provider to review them with you.                  Indwelling Lines/Drains at time of discharge:   Lines/Drains/Airways       Drain  Duration                  Hemodialysis AV Fistula Left upper arm -- days                    Time spent on the discharge of patient: 40 minutes         Mohini Yusuf MD  Department of Hospital Medicine  [facility]

## 2024-08-31 NOTE — CARE UPDATE
08/31/24 0830   Patient Assessment/Suction   Level of Consciousness (AVPU) alert   Respiratory Effort Normal;Unlabored   Expansion/Accessory Muscles/Retractions no use of accessory muscles   All Lung Fields Breath Sounds Anterior:;Lateral:;diminished   Rhythm/Pattern, Respiratory no shortness of breath reported   Cough Frequency no cough   PRE-TX-O2   Device (Oxygen Therapy) nasal cannula   $ Is the patient on Low Flow Oxygen? Yes   Flow (L/min) (Oxygen Therapy) 2   Oxygen Concentration (%) 28   SpO2 97 %   Pulse Oximetry Type Intermittent   $ Pulse Oximetry - Multiple Charge Pulse Oximetry - Multiple   Pulse 85   Resp 14   Education   $ Education Oxygen;15 min

## 2024-08-31 NOTE — CARE UPDATE
Pt continues to pull her nc off and desaturate into the upper 70's. Respiratory drive seems weak.       08/30/24 1941   Patient Assessment/Suction   Level of Consciousness (AVPU) responds to voice   Respiratory Effort Normal;Unlabored   Expansion/Accessory Muscles/Retractions no retractions   All Lung Fields Breath Sounds diminished   Rhythm/Pattern, Respiratory shallow   Cough Frequency no cough   PRE-TX-O2   Device (Oxygen Therapy) nasal cannula   Flow (L/min) (Oxygen Therapy) 3   SpO2 96 %   Pulse Oximetry Type Intermittent   Pulse 91   Resp 18

## 2024-08-31 NOTE — ASSESSMENT & PLAN NOTE
Resolved   Patient hyperglycemic but she was only receiving 10 units Lantus, home dose 22, home dose resumed on discharge  Patient non compliant   New glucometer and strips sent

## 2024-08-31 NOTE — PLAN OF CARE
Pt clear for DC from case management standpoint. Discharging to home with Dad to transport.      08/31/24 0901   Final Note   Assessment Type Final Discharge Note   Anticipated Discharge Disposition Home

## 2024-09-08 ENCOUNTER — HOSPITAL ENCOUNTER (EMERGENCY)
Facility: HOSPITAL | Age: 34
Discharge: HOME | End: 2024-09-08
Attending: EMERGENCY MEDICINE
Payer: MEDICAID

## 2024-09-08 ENCOUNTER — HOSPITAL ENCOUNTER (EMERGENCY)
Facility: HOSPITAL | Age: 34
Discharge: HOME | End: 2024-09-08
Attending: STUDENT IN AN ORGANIZED HEALTH CARE EDUCATION/TRAINING PROGRAM
Payer: MEDICAID

## 2024-09-08 VITALS
HEART RATE: 108 BPM | SYSTOLIC BLOOD PRESSURE: 187 MMHG | DIASTOLIC BLOOD PRESSURE: 90 MMHG | TEMPERATURE: 98 F | OXYGEN SATURATION: 98 % | HEIGHT: 62 IN | BODY MASS INDEX: 21.53 KG/M2 | WEIGHT: 117 LBS | RESPIRATION RATE: 21 BRPM

## 2024-09-08 VITALS
OXYGEN SATURATION: 99 % | TEMPERATURE: 98 F | DIASTOLIC BLOOD PRESSURE: 85 MMHG | WEIGHT: 117 LBS | HEIGHT: 62 IN | HEART RATE: 70 BPM | SYSTOLIC BLOOD PRESSURE: 135 MMHG | BODY MASS INDEX: 21.53 KG/M2 | RESPIRATION RATE: 19 BRPM

## 2024-09-08 DIAGNOSIS — R10.9 FLANK PAIN: Primary | ICD-10-CM

## 2024-09-08 DIAGNOSIS — G89.29 FLANK PAIN, CHRONIC: Primary | ICD-10-CM

## 2024-09-08 DIAGNOSIS — R10.9 FLANK PAIN, CHRONIC: Primary | ICD-10-CM

## 2024-09-08 LAB
ALBUMIN SERPL BCP-MCNC: 3.5 G/DL (ref 3.5–5.2)
ALBUMIN SERPL BCP-MCNC: 3.6 G/DL (ref 3.5–5.2)
ALP SERPL-CCNC: 103 U/L (ref 55–135)
ALP SERPL-CCNC: 82 U/L (ref 55–135)
ALT SERPL W/O P-5'-P-CCNC: 11 U/L (ref 10–44)
ALT SERPL W/O P-5'-P-CCNC: 11 U/L (ref 10–44)
ANION GAP SERPL CALC-SCNC: 17 MMOL/L (ref 8–16)
ANION GAP SERPL CALC-SCNC: 8 MMOL/L (ref 8–16)
AST SERPL-CCNC: 17 U/L (ref 10–40)
AST SERPL-CCNC: 19 U/L (ref 10–40)
BASOPHILS # BLD AUTO: 0.02 K/UL (ref 0–0.2)
BASOPHILS # BLD AUTO: 0.04 K/UL (ref 0–0.2)
BASOPHILS NFR BLD: 0.2 % (ref 0–1.9)
BASOPHILS NFR BLD: 0.4 % (ref 0–1.9)
BILIRUB SERPL-MCNC: 0.6 MG/DL (ref 0.1–1)
BILIRUB SERPL-MCNC: 0.9 MG/DL (ref 0.1–1)
BUN SERPL-MCNC: 13 MG/DL (ref 6–20)
BUN SERPL-MCNC: 18 MG/DL (ref 6–20)
CALCIUM SERPL-MCNC: 9.1 MG/DL (ref 8.7–10.5)
CALCIUM SERPL-MCNC: 9.8 MG/DL (ref 8.7–10.5)
CHLORIDE SERPL-SCNC: 97 MMOL/L (ref 95–110)
CHLORIDE SERPL-SCNC: 98 MMOL/L (ref 95–110)
CO2 SERPL-SCNC: 24 MMOL/L (ref 23–29)
CO2 SERPL-SCNC: 33 MMOL/L (ref 23–29)
CREAT SERPL-MCNC: 4.1 MG/DL (ref 0.5–1.4)
CREAT SERPL-MCNC: 4.2 MG/DL (ref 0.5–1.4)
DIFFERENTIAL METHOD BLD: ABNORMAL
DIFFERENTIAL METHOD BLD: ABNORMAL
EOSINOPHIL # BLD AUTO: 0.1 K/UL (ref 0–0.5)
EOSINOPHIL # BLD AUTO: 0.2 K/UL (ref 0–0.5)
EOSINOPHIL NFR BLD: 0.7 % (ref 0–8)
EOSINOPHIL NFR BLD: 1.8 % (ref 0–8)
ERYTHROCYTE [DISTWIDTH] IN BLOOD BY AUTOMATED COUNT: 17 % (ref 11.5–14.5)
ERYTHROCYTE [DISTWIDTH] IN BLOOD BY AUTOMATED COUNT: 17.4 % (ref 11.5–14.5)
EST. GFR  (NO RACE VARIABLE): 13.5 ML/MIN/1.73 M^2
EST. GFR  (NO RACE VARIABLE): 13.9 ML/MIN/1.73 M^2
GLUCOSE SERPL-MCNC: 178 MG/DL (ref 70–110)
GLUCOSE SERPL-MCNC: 178 MG/DL (ref 70–110)
GLUCOSE SERPL-MCNC: 315 MG/DL (ref 70–110)
HCT VFR BLD AUTO: 27.7 % (ref 37–48.5)
HCT VFR BLD AUTO: 28.7 % (ref 37–48.5)
HGB BLD-MCNC: 9.2 G/DL (ref 12–16)
HGB BLD-MCNC: 9.9 G/DL (ref 12–16)
IMM GRANULOCYTES # BLD AUTO: 0.08 K/UL (ref 0–0.04)
IMM GRANULOCYTES # BLD AUTO: 0.08 K/UL (ref 0–0.04)
IMM GRANULOCYTES NFR BLD AUTO: 0.8 % (ref 0–0.5)
IMM GRANULOCYTES NFR BLD AUTO: 0.9 % (ref 0–0.5)
LIPASE SERPL-CCNC: 23 U/L (ref 4–60)
LYMPHOCYTES # BLD AUTO: 0.8 K/UL (ref 1–4.8)
LYMPHOCYTES # BLD AUTO: 0.9 K/UL (ref 1–4.8)
LYMPHOCYTES NFR BLD: 8.2 % (ref 18–48)
LYMPHOCYTES NFR BLD: 8.5 % (ref 18–48)
MCH RBC QN AUTO: 28.4 PG (ref 27–31)
MCH RBC QN AUTO: 28.9 PG (ref 27–31)
MCHC RBC AUTO-ENTMCNC: 33.2 G/DL (ref 32–36)
MCHC RBC AUTO-ENTMCNC: 34.5 G/DL (ref 32–36)
MCV RBC AUTO: 84 FL (ref 82–98)
MCV RBC AUTO: 86 FL (ref 82–98)
MONOCYTES # BLD AUTO: 0.7 K/UL (ref 0.3–1)
MONOCYTES # BLD AUTO: 0.7 K/UL (ref 0.3–1)
MONOCYTES NFR BLD: 6.6 % (ref 4–15)
MONOCYTES NFR BLD: 7.5 % (ref 4–15)
NEUTROPHILS # BLD AUTO: 7.6 K/UL (ref 1.8–7.7)
NEUTROPHILS # BLD AUTO: 8.5 K/UL (ref 1.8–7.7)
NEUTROPHILS NFR BLD: 81.9 % (ref 38–73)
NEUTROPHILS NFR BLD: 82.5 % (ref 38–73)
NRBC BLD-RTO: 0 /100 WBC
NRBC BLD-RTO: 0 /100 WBC
PLATELET # BLD AUTO: 244 K/UL (ref 150–450)
PLATELET # BLD AUTO: 247 K/UL (ref 150–450)
PMV BLD AUTO: 10.2 FL (ref 9.2–12.9)
PMV BLD AUTO: 10.8 FL (ref 9.2–12.9)
POCT GLUCOSE: 125 MG/DL (ref 70–110)
POCT GLUCOSE: 126 MG/DL (ref 70–110)
POCT GLUCOSE: 134 MG/DL (ref 70–110)
POCT GLUCOSE: 136 MG/DL (ref 70–110)
POCT GLUCOSE: 199 MG/DL (ref 70–110)
POCT GLUCOSE: 309 MG/DL (ref 70–110)
POCT GLUCOSE: 346 MG/DL (ref 70–110)
POCT GLUCOSE: 93 MG/DL (ref 70–110)
POCT GLUCOSE: <20 MG/DL (ref 70–110)
POCT GLUCOSE: <20 MG/DL (ref 70–110)
POTASSIUM SERPL-SCNC: 3 MMOL/L (ref 3.5–5.1)
POTASSIUM SERPL-SCNC: 4 MMOL/L (ref 3.5–5.1)
PROT SERPL-MCNC: 6.6 G/DL (ref 6–8.4)
PROT SERPL-MCNC: 8.1 G/DL (ref 6–8.4)
RBC # BLD AUTO: 3.24 M/UL (ref 4–5.4)
RBC # BLD AUTO: 3.43 M/UL (ref 4–5.4)
SODIUM SERPL-SCNC: 138 MMOL/L (ref 136–145)
SODIUM SERPL-SCNC: 139 MMOL/L (ref 136–145)
WBC # BLD AUTO: 10.43 K/UL (ref 3.9–12.7)
WBC # BLD AUTO: 9.17 K/UL (ref 3.9–12.7)

## 2024-09-08 PROCEDURE — 83690 ASSAY OF LIPASE: CPT | Performed by: EMERGENCY MEDICINE

## 2024-09-08 PROCEDURE — 96374 THER/PROPH/DIAG INJ IV PUSH: CPT

## 2024-09-08 PROCEDURE — 85025 COMPLETE CBC W/AUTO DIFF WBC: CPT | Mod: 91 | Performed by: EMERGENCY MEDICINE

## 2024-09-08 PROCEDURE — 63600175 PHARM REV CODE 636 W HCPCS: Performed by: EMERGENCY MEDICINE

## 2024-09-08 PROCEDURE — 80053 COMPREHEN METABOLIC PANEL: CPT | Performed by: STUDENT IN AN ORGANIZED HEALTH CARE EDUCATION/TRAINING PROGRAM

## 2024-09-08 PROCEDURE — 25000003 PHARM REV CODE 250: Performed by: EMERGENCY MEDICINE

## 2024-09-08 PROCEDURE — 82962 GLUCOSE BLOOD TEST: CPT

## 2024-09-08 PROCEDURE — 80053 COMPREHEN METABOLIC PANEL: CPT | Mod: 91 | Performed by: EMERGENCY MEDICINE

## 2024-09-08 PROCEDURE — 99284 EMERGENCY DEPT VISIT MOD MDM: CPT | Mod: 25

## 2024-09-08 PROCEDURE — 85025 COMPLETE CBC W/AUTO DIFF WBC: CPT | Performed by: STUDENT IN AN ORGANIZED HEALTH CARE EDUCATION/TRAINING PROGRAM

## 2024-09-08 PROCEDURE — 25000003 PHARM REV CODE 250: Performed by: STUDENT IN AN ORGANIZED HEALTH CARE EDUCATION/TRAINING PROGRAM

## 2024-09-08 PROCEDURE — 96375 TX/PRO/DX INJ NEW DRUG ADDON: CPT

## 2024-09-08 PROCEDURE — 96372 THER/PROPH/DIAG INJ SC/IM: CPT | Mod: 59 | Performed by: EMERGENCY MEDICINE

## 2024-09-08 PROCEDURE — 25000003 PHARM REV CODE 250

## 2024-09-08 PROCEDURE — 63600175 PHARM REV CODE 636 W HCPCS: Performed by: STUDENT IN AN ORGANIZED HEALTH CARE EDUCATION/TRAINING PROGRAM

## 2024-09-08 RX ORDER — DEXTROSE 50 % IN WATER (D50W) INTRAVENOUS SYRINGE
25
Status: COMPLETED | OUTPATIENT
Start: 2024-09-08 | End: 2024-09-08

## 2024-09-08 RX ORDER — HALOPERIDOL 5 MG/ML
5 INJECTION INTRAMUSCULAR
Status: DISCONTINUED | OUTPATIENT
Start: 2024-09-08 | End: 2024-09-08

## 2024-09-08 RX ORDER — CLONIDINE HYDROCHLORIDE 0.1 MG/1
0.1 TABLET ORAL
Status: COMPLETED | OUTPATIENT
Start: 2024-09-08 | End: 2024-09-08

## 2024-09-08 RX ORDER — MORPHINE SULFATE 2 MG/ML
6 INJECTION, SOLUTION INTRAMUSCULAR; INTRAVENOUS ONCE
Status: COMPLETED | OUTPATIENT
Start: 2024-09-08 | End: 2024-09-08

## 2024-09-08 RX ORDER — ACETAMINOPHEN 500 MG
1000 TABLET ORAL
Status: COMPLETED | OUTPATIENT
Start: 2024-09-08 | End: 2024-09-08

## 2024-09-08 RX ORDER — OXYCODONE HYDROCHLORIDE 5 MG/1
5 TABLET ORAL
Status: COMPLETED | OUTPATIENT
Start: 2024-09-08 | End: 2024-09-08

## 2024-09-08 RX ORDER — HYDRALAZINE HYDROCHLORIDE 20 MG/ML
10 INJECTION INTRAMUSCULAR; INTRAVENOUS
Status: COMPLETED | OUTPATIENT
Start: 2024-09-08 | End: 2024-09-08

## 2024-09-08 RX ORDER — MORPHINE SULFATE 2 MG/ML
6 INJECTION, SOLUTION INTRAMUSCULAR; INTRAVENOUS
Status: COMPLETED | OUTPATIENT
Start: 2024-09-08 | End: 2024-09-08

## 2024-09-08 RX ADMIN — MORPHINE SULFATE 6 MG: 2 INJECTION, SOLUTION INTRAMUSCULAR; INTRAVENOUS at 07:09

## 2024-09-08 RX ADMIN — CLONIDINE HYDROCHLORIDE 0.1 MG: 0.1 TABLET ORAL at 10:09

## 2024-09-08 RX ADMIN — CLONIDINE HYDROCHLORIDE 0.1 MG: 0.1 TABLET ORAL at 07:09

## 2024-09-08 RX ADMIN — DEXTROSE 50 % IN WATER (D50W) INTRAVENOUS SYRINGE 25 G: at 11:09

## 2024-09-08 RX ADMIN — MORPHINE SULFATE 6 MG: 2 INJECTION, SOLUTION INTRAMUSCULAR; INTRAVENOUS at 09:09

## 2024-09-08 RX ADMIN — INSULIN HUMAN 6 UNITS: 100 INJECTION, SOLUTION PARENTERAL at 07:09

## 2024-09-08 RX ADMIN — HYDRALAZINE HYDROCHLORIDE 10 MG: 20 INJECTION, SOLUTION INTRAMUSCULAR; INTRAVENOUS at 03:09

## 2024-09-08 RX ADMIN — OXYCODONE 5 MG: 5 TABLET ORAL at 04:09

## 2024-09-08 RX ADMIN — ACETAMINOPHEN 1000 MG: 500 TABLET ORAL at 03:09

## 2024-09-08 RX ADMIN — LORAZEPAM 2 MG: 2 INJECTION INTRAMUSCULAR; INTRAVENOUS at 03:09

## 2024-09-08 RX ADMIN — DEXTROSE MONOHYDRATE 25 G: 25 INJECTION, SOLUTION INTRAVENOUS at 11:09

## 2024-09-08 NOTE — ED PROVIDER NOTES
"Encounter Date: 2024       History     Chief Complaint   Patient presents with    Pain     Kidney pain     34-year-old female known to this ED due to multiple ED visits, with significant history below includes end-stage renal disease on hemodialysis, T//Sat, last dialysis yesterday, diabetes with gastroparesis.    She returns to the ED today with chief complaints of left flank pain with onset upon waking up from bed this morning.  Nothing makes it better or worse. She reports associated nausea. Had a normal bowel movement today.      The history is provided by the patient. No  was used.     Review of patient's allergies indicates:   Allergen Reactions    Droperidol Hives    Penicillins Hives    Droperidol (bulk) Anxiety     STATES "FREAKS OUT".  TOLERATES HALDOL PRIOR TO ARRIVAL AT ANOTHER FACILITY TODAY.      Past Medical History:   Diagnosis Date    ESRD on hemodialysis 2022    Gastritis 2022    EGD was 22    Gastroparesis 2022    has not had Emptying study    Heart failure with preserved ejection fraction 2022    EF 55% on 3/22    History of supraventricular tachycardia     Hyperkalemia 2022    Hypertensive emergency 2022    Sickle cell trait 2022    Type 2 diabetes mellitus      Past Surgical History:   Procedure Laterality Date     SECTION      x 3    COLONOSCOPY      COLONOSCOPY N/A 2022    Procedure: COLONOSCOPY;  Surgeon: Jagdeep Cedeno MD;  Location: [facility];  Service: Endoscopy;  Laterality: N/A;    DESTRUCTION, CILIARY BODY, USING LASER Left 2024    Procedure: Cyclophotocoagulation G-probe, retrobulbar chlorpromazine left eye;  Surgeon: Fidel Wise MD;  Location: [address];  Service: Ophthalmology;  Laterality: Left;    ENDOSCOPIC ULTRASOUND OF UPPER GASTROINTESTINAL TRACT N/A 2023    Procedure: ULTRASOUND, UPPER GI TRACT, ENDOSCOPIC;  Surgeon: Micky Paredes III, MD;  Location: [facility];  " Service: Endoscopy;  Laterality: N/A;    ESOPHAGOGASTRODUODENOSCOPY N/A 10/18/2019    Procedure: ESOPHAGOGASTRODUODENOSCOPY (EGD);  Surgeon: Gianluca Mendez MD;  Location: [facility];  Service: Endoscopy;  Laterality: N/A;    ESOPHAGOGASTRODUODENOSCOPY N/A 08/24/2022    Procedure: EGD (ESOPHAGOGASTRODUODENOSCOPY);  Surgeon: Micky Paredes III, MD;  Location: [facility];  Service: Endoscopy;  Laterality: N/A;    ESOPHAGOGASTRODUODENOSCOPY N/A 12/05/2022    Procedure: EGD (ESOPHAGOGASTRODUODENOSCOPY);  Surgeon: Marcelo Zhong MD;  Location: [facility];  Service: Endoscopy;  Laterality: N/A;    EYE SURGERY      INSERTION, CATHETER, TUNNELED N/A 06/17/2023    Procedure: Insertion,catheter,tunneled;  Surgeon: Carlos Thurman Jr., MD;  Location: [facility];  Service: General;  Laterality: N/A;    LAPAROSCOPIC CHOLECYSTECTOMY N/A 07/30/2022    Procedure: CHOLECYSTECTOMY, LAPAROSCOPIC;  Surgeon: Grey Perez MD;  Location: [facility];  Service: General;  Laterality: N/A;    PLACEMENT OF DUAL-LUMEN VASCULAR CATHETER Left 07/12/2022    Procedure: INSERTION-CATHETER-JOSEPH;  Surgeon: Dionte Gan MD;  Location: [facility];  Service: General;  Laterality: Left;    PLACEMENT OF DUAL-LUMEN VASCULAR CATHETER Right 07/26/2022    Procedure: INSERTION-CATHETER-Hemosplit;  Surgeon: Dionte Gan MD;  Location: [facility];  Service: General;  Laterality: Right;     Family History   Problem Relation Name Age of Onset    Diabetes Mother      Diabetes Father       Social History     Tobacco Use    Smoking status: Never    Smokeless tobacco: Never   Substance Use Topics    Alcohol use: Not Currently    Drug use: No     Review of Systems   Constitutional: Negative.    HENT: Negative.     Eyes: Negative.    Respiratory: Negative.     Cardiovascular: Negative.    Gastrointestinal:  Positive for nausea.   Endocrine: Negative.    Genitourinary: Negative.    Musculoskeletal:  Positive for back pain.   Allergic/Immunologic: Negative.     Neurological: Negative.    Hematological: Negative.    Psychiatric/Behavioral: Negative.     All other systems reviewed and are negative.      Physical Exam     Initial Vitals   BP Pulse Resp Temp SpO2   09/08/24 0306 09/08/24 0306 09/08/24 0309 09/08/24 0309 09/08/24 0306   (!) 222/113 100 20 98 °F (36.7 °C) 100 %      MAP       --                Physical Exam    Nursing note and vitals reviewed.  Constitutional: She appears well-developed.   Grimacing, anxious would not sit still    HENT:   Head: Normocephalic.   Eyes: Pupils are equal, round, and reactive to light.   Neck:   Normal range of motion.  Pulmonary/Chest: Breath sounds normal. No respiratory distress.   Abdominal: Abdomen is soft.   Musculoskeletal:      Cervical back: Normal range of motion.     Neurological: She is alert and oriented to person, place, and time. She has normal strength. GCS score is 15. GCS eye subscore is 4. GCS verbal subscore is 5. GCS motor subscore is 6.   Skin: Skin is warm. Capillary refill takes less than 2 seconds.   Psychiatric: She has a normal mood and affect.         ED Course   Procedures  Labs Reviewed   CBC W/ AUTO DIFFERENTIAL - Abnormal       Result Value    WBC 10.43      RBC 3.43 (*)     Hemoglobin 9.9 (*)     Hematocrit 28.7 (*)     MCV 84      MCH 28.9      MCHC 34.5      RDW 17.0 (*)     Platelets 247      MPV 10.8      Immature Granulocytes 0.8 (*)     Gran # (ANC) 8.5 (*)     Immature Grans (Abs) 0.08 (*)     Lymph # 0.9 (*)     Mono # 0.7      Eos # 0.2      Baso # 0.04      nRBC 0      Gran % 81.9 (*)     Lymph % 8.5 (*)     Mono % 6.6      Eosinophil % 1.8      Basophil % 0.4      Differential Method Automated     COMPREHENSIVE METABOLIC PANEL - Abnormal    Sodium 138      Potassium 4.0      Chloride 97      CO2 24      Glucose 315 (*)     BUN 13      Creatinine 4.1 (*)     Calcium 9.8      Total Protein 8.1      Albumin 3.6      Total Bilirubin 0.9      Alkaline Phosphatase 103      AST 17      ALT 11       eGFR 13.9 (*)     Anion Gap 17 (*)    POCT GLUCOSE - Abnormal    POCT Glucose 309 (*)    POCT GLUCOSE MONITORING CONTINUOUS          Imaging Results    None          Medications   acetaminophen tablet 1,000 mg (1,000 mg Oral Given 9/8/24 0314)   LORazepam (ATIVAN) injection 2 mg (2 mg Intravenous Given 9/8/24 0331)   hydrALAZINE injection 10 mg (10 mg Intravenous Given 9/8/24 0331)   oxyCODONE immediate release tablet 5 mg (5 mg Oral Given 9/8/24 0407)     Medical Decision Making  34-year-old female with a chronic flank pain  Screening labs CBC, CMP without significant gross abnormalities beyond her baseline  Received p.o. analgesics in the ED  Patient was seen and reevaluated. Patient's symptoms seem to be stable. I discussed the patient's diagnosis, treatment plan, and plan for discharge with the patient. Patient was instructed to follow up with PCP and was given strict return precautions to the ED. The patient voiced understanding and agreed with the plan      Amount and/or Complexity of Data Reviewed  Labs: ordered.    Risk  OTC drugs.  Prescription drug management.                                      Clinical Impression:  Final diagnoses:  [R10.9, G89.29] Flank pain, chronic (Primary)          ED Disposition Condition    Discharge Stable          ED Prescriptions    None       Follow-up Information       Follow up With Specialties Details Why Contact Info    Melony Kim NP Family Medicine   [address] [place] [id number]  [phone]               Luis Alfredo Fournier MD  09/08/24 2136

## 2024-09-08 NOTE — ED NOTES
Rounded with Dr. Esteban at bedside. Plan for IM pain meds and BP med po. Pt had labs at 0331 9/8/24.

## 2024-09-08 NOTE — DISCHARGE INSTRUCTIONS
Follow up with the primary care physician  Return to ED at any time if any new or worsening symptoms

## 2024-09-08 NOTE — ED NOTES
"Able to gain iv access but unable to thread catheter. IV medications and blood drawn and flushed. IV removed and dressing applied to site. No bleeding noted. Patient asking for pain medications again and demanding to speak to daughter. States "why are yall not giving me what I need". Will continue to monitor blood pressure and continue to watch closely.   "

## 2024-09-08 NOTE — ED NOTES
Notified pt's BG <20. Immediately went to bedside to recheck BG, recheck <20. MD at bedside, IVs placed. Dextrose given. Recheck . Labs sent. Pt more awake and provided meal tray with orange and apple juice.

## 2024-09-08 NOTE — ED NOTES
"Multiple trips in room with patient screaming "nurse", asking for dilaudid. Explained to patient that we have given her ativan, PO pain medications and we are not giving her dilaudid that she is to see her primary care doctor for chronic pain syndrome. MD at bedside (Dr. Fournier) also explaining that we are not giving anything else for pain. Father attempted to call but no answer. Message left for him to call back for transportation.   "

## 2024-09-08 NOTE — ED NOTES
Vitals taken before discharge, unable to assess temp by mouth or axillary. Pt refused rectal temp. All other vitals stable. PIVs removed.

## 2024-09-08 NOTE — ED NOTES
Patient refused haldol IM states she is allergic. Explained to patient that she had it a couple of weeks ago without reaction. Patient refuses to take, wants pain medication instead.

## 2024-09-08 NOTE — ED NOTES
"Pt screaming "nurse". Responded to the room. Pt stating they have "pain". Informed Pt that MD has not ordered anything else.   "

## 2024-09-08 NOTE — ED NOTES
Patient now sticking her finger down her throat causing herself to vomit. Patient was seen doing this on last ER visit as well exhibiting drug seeking behavior. Advised patient not to do that, given emesis basis.

## 2024-09-08 NOTE — ED PROVIDER NOTES
"Encounter Date: 2024       History     Chief Complaint   Patient presents with    Flank Pain     Left sided flank pain since this morning - does not make urine anymore and most recently dialysis was on Sat (3.5 off)     34-year-old female presented emergency department with flank pain.  Patient had multiple similar presentations in the past.  Patient was seen at another ER and was discharged 2 hours ago and patient presented here saying she has pain.  Patient had multiple similar presentations in the past.  Patient is asking for Dilaudid.  Patient in the past had an episode of cardiac arrest after taking Dilaudid.  Patient has this pain which is consistent with chronic pain and patient had multiple visits to the ER for similar pain and had multiple CT scans.  Patient had a CT scan of the abdomen as recently as 10 days ago.  Denies any focal weakness or numbness.  Patient has end-stage renal disease and on dialysis and she gets dialysis on Tuesday, Thursday and Saturday.  Patient had labs done few hours ago at the other hospital.  Patient had slight hyperglycemia noted and patient is hypotensive.      Review of patient's allergies indicates:   Allergen Reactions    Droperidol Hives    Penicillins Hives    Droperidol (bulk) Anxiety     STATES "FREAKS OUT".  TOLERATES HALDOL PRIOR TO ARRIVAL AT ANOTHER FACILITY TODAY.      Past Medical History:   Diagnosis Date    ESRD on hemodialysis 2022    Gastritis 2022    EGD was 21    Gastroparesis 2022    has not had Emptying study    Heart failure with preserved ejection fraction 2022    EF 55% on 3/22    History of supraventricular tachycardia     Hyperkalemia 2022    Hypertensive emergency 2022    Sickle cell trait 2022    Type 2 diabetes mellitus      Past Surgical History:   Procedure Laterality Date     SECTION      x 3    COLONOSCOPY      COLONOSCOPY N/A 2022    Procedure: COLONOSCOPY;  Surgeon: Jagdeep Cedeno, " MD;  Location: [facility];  Service: Endoscopy;  Laterality: N/A;    DESTRUCTION, CILIARY BODY, USING LASER Left 03/08/2024    Procedure: Cyclophotocoagulation G-probe, retrobulbar chlorpromazine left eye;  Surgeon: Fidel Wise MD;  Location: [address];  Service: Ophthalmology;  Laterality: Left;    ENDOSCOPIC ULTRASOUND OF UPPER GASTROINTESTINAL TRACT N/A 12/16/2023    Procedure: ULTRASOUND, UPPER GI TRACT, ENDOSCOPIC;  Surgeon: Micky Paredes III, MD;  Location: [facility];  Service: Endoscopy;  Laterality: N/A;    ESOPHAGOGASTRODUODENOSCOPY N/A 10/18/2019    Procedure: ESOPHAGOGASTRODUODENOSCOPY (EGD);  Surgeon: Gianluca Mendez MD;  Location: [facility];  Service: Endoscopy;  Laterality: N/A;    ESOPHAGOGASTRODUODENOSCOPY N/A 08/24/2022    Procedure: EGD (ESOPHAGOGASTRODUODENOSCOPY);  Surgeon: Micky Paredes III, MD;  Location: [facility];  Service: Endoscopy;  Laterality: N/A;    ESOPHAGOGASTRODUODENOSCOPY N/A 12/05/2022    Procedure: EGD (ESOPHAGOGASTRODUODENOSCOPY);  Surgeon: Marcelo Zhong MD;  Location: [facility];  Service: Endoscopy;  Laterality: N/A;    EYE SURGERY      INSERTION, CATHETER, TUNNELED N/A 06/17/2023    Procedure: Insertion,catheter,tunneled;  Surgeon: Carlos Thurman Jr., MD;  Location: [facility];  Service: General;  Laterality: N/A;    LAPAROSCOPIC CHOLECYSTECTOMY N/A 07/30/2022    Procedure: CHOLECYSTECTOMY, LAPAROSCOPIC;  Surgeon: Grey Perez MD;  Location: [facility];  Service: General;  Laterality: N/A;    PLACEMENT OF DUAL-LUMEN VASCULAR CATHETER Left 07/12/2022    Procedure: INSERTION-CATHETER-JOSEPH;  Surgeon: Dionte Gan MD;  Location: [facility];  Service: General;  Laterality: Left;    PLACEMENT OF DUAL-LUMEN VASCULAR CATHETER Right 07/26/2022    Procedure: INSERTION-CATHETER-Hemosplit;  Surgeon: Dionte Gan MD;  Location: [facility];  Service: General;  Laterality: Right;     Family History   Problem Relation Name Age of Onset    Diabetes Mother       Diabetes Father       Social History     Tobacco Use    Smoking status: Never    Smokeless tobacco: Never   Substance Use Topics    Alcohol use: Not Currently    Drug use: No     Review of Systems   Constitutional: Negative.    HENT: Negative.     Eyes: Negative.    Respiratory: Negative.     Cardiovascular: Negative.  Negative for chest pain.   Gastrointestinal: Negative.    Endocrine: Negative.    Genitourinary:  Positive for flank pain.   Skin: Negative.    Allergic/Immunologic: Negative.    Neurological: Negative.    Hematological: Negative.    Psychiatric/Behavioral: Negative.     All other systems reviewed and are negative.      Physical Exam     Initial Vitals [09/08/24 0722]   BP Pulse Resp Temp SpO2   (!) 209/124 96 (!) 22 98.3 °F (36.8 °C) 100 %      MAP       --         Physical Exam    Nursing note and vitals reviewed.  Constitutional: She appears well-developed and well-nourished.   HENT:   Head: Normocephalic and atraumatic.   Nose: Nose normal.   Mouth/Throat: Oropharynx is clear and moist.   Eyes: Conjunctivae and EOM are normal. Pupils are equal, round, and reactive to light.   Neck: Neck supple. No thyromegaly present. No tracheal deviation present. No JVD present.   Normal range of motion.  Cardiovascular:  Normal rate, regular rhythm, normal heart sounds and intact distal pulses.           No murmur heard.  Pulmonary/Chest: Breath sounds normal. No stridor. No respiratory distress. She has no wheezes. She has no rales.   Abdominal: Abdomen is soft. Bowel sounds are normal. There is no abdominal tenderness.   Musculoskeletal:         General: No edema. Normal range of motion.      Cervical back: Normal range of motion and neck supple.      Comments: Mild discomfort in the lower back region.  Patient had similar pains multiple times in the past.     Neurological: She is alert and oriented to person, place, and time.   Skin: Skin is warm. Capillary refill takes less than 2 seconds.   Psychiatric:  She has a normal mood and affect. Thought content normal.         ED Course   Procedures  Labs Reviewed   CBC W/ AUTO DIFFERENTIAL - Abnormal       Result Value    WBC 9.17      RBC 3.24 (*)     Hemoglobin 9.2 (*)     Hematocrit 27.7 (*)     MCV 86      MCH 28.4      MCHC 33.2      RDW 17.4 (*)     Platelets 244      MPV 10.2      Immature Granulocytes 0.9 (*)     Gran # (ANC) 7.6      Immature Grans (Abs) 0.08 (*)     Lymph # 0.8 (*)     Mono # 0.7      Eos # 0.1      Baso # 0.02      nRBC 0      Gran % 82.5 (*)     Lymph % 8.2 (*)     Mono % 7.5      Eosinophil % 0.7      Basophil % 0.2      Differential Method Automated     POCT GLUCOSE - Abnormal    POCT Glucose 346 (*)    POCT GLUCOSE - Abnormal    POCT Glucose <20 (*)    POCT GLUCOSE - Abnormal    POCT Glucose <20 (*)    POCT GLUCOSE - Abnormal    POCT Glucose 199 (*)    POCT GLUCOSE - Abnormal    POCT Glucose 125 (*)    POCT GLUCOSE - Abnormal    POCT Glucose 134 (*)    POCT GLUCOSE - Abnormal    POCT Glucose 126 (*)    COMPREHENSIVE METABOLIC PANEL   LIPASE   URINALYSIS, REFLEX TO URINE CULTURE   GLUCOSE, RANDOM   POCT GLUCOSE    POCT Glucose 93            Imaging Results    None          Medications   morphine injection 6 mg (6 mg Intramuscular Given 9/8/24 0758)   cloNIDine tablet 0.1 mg (0.1 mg Oral Given 9/8/24 0758)   insulin regular injection 6 Units 0.06 mL (6 Units Subcutaneous Given 9/8/24 0758)   morphine injection 6 mg (6 mg Intramuscular Given 9/8/24 0921)   cloNIDine tablet 0.1 mg (0.1 mg Oral Given 9/8/24 1037)   dextrose 50 % in water (D50W) injection 25 g (25 g Intravenous Given 9/8/24 1137)     Medical Decision Making  Patient with chronic flank and back pain.  Patient had similar pains multiple times in the past and I personally saw the patient in similar pain and presentation.  This could be partly related to narcotic withdrawal.  Patient has been asking for Dilaudid.  Given patient's previous reaction to Dilaudid will treat with morphine  as patient tolerated morphine better in the past.  Patient had labs and CT scan done recently.  Labs reviewed.  Hyperglycemia to be gently treated and pain control and plan is to discharged with instructions to follow-up with nephrology.  Return precautions given.  Plan is to do pain control.  Hypoglycemia and hypotension also treated.  Patient while in the emergency department had an episode when she became hypoglycemic.  D50 given and patient woke up and is mentating well and able to eat.  Patient said she did not eat all day as she was nauseated and small dose of insulin given in the emergency department along with not eating might be contributing to hypoglycemia and now patient is back to baseline and is able to eat and is mentating well and blood sugar improved.    Amount and/or Complexity of Data Reviewed  Labs: ordered. Decision-making details documented in ED Course.  Discussion of management or test interpretation with external provider(s): Blood pressure improved and pain resolved with treatment of pain.  I believe patient likely is having symptoms secondary to narcotic withdrawal and once narcotic medication given patient's symptoms completely resolved and I have seen this pattern in the past as well.  Discharged with return precautions and instructions and follow-up.  Advised to follow-up with primary care and pain management.    Risk  OTC drugs.  Prescription drug management.  Risk Details: Patient had hypoglycemic episode which resolved with D50 and patient ate and patient's mental status is stable and more awake once she ate.  Patient observed in the emergency department for about 6 hours and patient is completely back to normal and hemodynamically stable and blood sugar has been stable so discharged with return precautions and instructions and follow-up                                      Clinical Impression:  Final diagnoses:  [R10.9] Flank pain (Primary)          ED Disposition Condition     Discharge Stable          ED Prescriptions    None       Follow-up Information       Follow up With Specialties Details Why Contact Info    Melony Kim, NP Family Medicine In 1 day  [address] [facility] [address]  [phone]      [facility], Psychiatry In 2 days  [address] [place] [address]  [place] [address]  [phone]               Vinod Esteban MD  09/08/24 1122       Vinod Esteban MD  09/08/24 1431

## 2024-09-10 ENCOUNTER — HOSPITAL ENCOUNTER (INPATIENT)
Facility: HOSPITAL | Age: 34
LOS: 4 days | Discharge: HOME | DRG: 637 | End: 2024-09-14
Attending: STUDENT IN AN ORGANIZED HEALTH CARE EDUCATION/TRAINING PROGRAM | Admitting: HOSPITALIST
Payer: MEDICAID

## 2024-09-10 ENCOUNTER — HOSPITAL ENCOUNTER (EMERGENCY)
Facility: HOSPITAL | Age: 34
Discharge: HOME | End: 2024-09-10
Attending: EMERGENCY MEDICINE
Payer: MEDICAID

## 2024-09-10 VITALS
RESPIRATION RATE: 20 BRPM | DIASTOLIC BLOOD PRESSURE: 96 MMHG | BODY MASS INDEX: 21.53 KG/M2 | TEMPERATURE: 99 F | OXYGEN SATURATION: 100 % | SYSTOLIC BLOOD PRESSURE: 195 MMHG | WEIGHT: 117 LBS | HEIGHT: 62 IN | HEART RATE: 96 BPM

## 2024-09-10 DIAGNOSIS — K31.84 GASTROPARESIS: Chronic | ICD-10-CM

## 2024-09-10 DIAGNOSIS — N18.6 ESRD (END STAGE RENAL DISEASE) ON DIALYSIS: ICD-10-CM

## 2024-09-10 DIAGNOSIS — Z99.2 ESRD (END STAGE RENAL DISEASE) ON DIALYSIS: ICD-10-CM

## 2024-09-10 DIAGNOSIS — E13.10 DIABETIC KETOACIDOSIS WITHOUT COMA ASSOCIATED WITH OTHER SPECIFIED DIABETES MELLITUS: Primary | ICD-10-CM

## 2024-09-10 DIAGNOSIS — G89.4 CHRONIC PAIN SYNDROME: ICD-10-CM

## 2024-09-10 DIAGNOSIS — R10.9 LEFT FLANK PAIN: Primary | ICD-10-CM

## 2024-09-10 DIAGNOSIS — E87.29 HIGH ANION GAP METABOLIC ACIDOSIS: ICD-10-CM

## 2024-09-10 LAB
ALBUMIN SERPL BCP-MCNC: 3.4 G/DL (ref 3.5–5.2)
ALBUMIN SERPL BCP-MCNC: 4.1 G/DL (ref 3.5–5.2)
ALLENS TEST: NORMAL
ALP SERPL-CCNC: 99 U/L (ref 55–135)
ALP SERPL-CCNC: 99 U/L (ref 55–135)
ALT SERPL W/O P-5'-P-CCNC: 13 U/L (ref 10–44)
ALT SERPL W/O P-5'-P-CCNC: 14 U/L (ref 10–44)
ANION GAP SERPL CALC-SCNC: 19 MMOL/L (ref 8–16)
ANION GAP SERPL CALC-SCNC: 21 MMOL/L (ref 8–16)
ANION GAP SERPL CALC-SCNC: 22 MMOL/L (ref 8–16)
AST SERPL-CCNC: 18 U/L (ref 10–40)
AST SERPL-CCNC: 19 U/L (ref 10–40)
B-OH-BUTYR BLD STRIP-SCNC: 4.1 MMOL/L (ref 0–0.5)
BASOPHILS # BLD AUTO: 0.04 K/UL (ref 0–0.2)
BASOPHILS # BLD AUTO: 0.05 K/UL (ref 0–0.2)
BASOPHILS NFR BLD: 0.5 % (ref 0–1.9)
BASOPHILS NFR BLD: 0.5 % (ref 0–1.9)
BILIRUB SERPL-MCNC: 1 MG/DL (ref 0.1–1)
BILIRUB SERPL-MCNC: 1.1 MG/DL (ref 0.1–1)
BUN SERPL-MCNC: 30 MG/DL (ref 6–20)
BUN SERPL-MCNC: 32 MG/DL (ref 6–20)
BUN SERPL-MCNC: 33 MG/DL (ref 6–20)
CALCIUM SERPL-MCNC: 8.8 MG/DL (ref 8.7–10.5)
CALCIUM SERPL-MCNC: 9.3 MG/DL (ref 8.7–10.5)
CALCIUM SERPL-MCNC: 9.5 MG/DL (ref 8.7–10.5)
CHLORIDE SERPL-SCNC: 93 MMOL/L (ref 95–110)
CHLORIDE SERPL-SCNC: 96 MMOL/L (ref 95–110)
CHLORIDE SERPL-SCNC: 97 MMOL/L (ref 95–110)
CO2 SERPL-SCNC: 19 MMOL/L (ref 23–29)
CO2 SERPL-SCNC: 22 MMOL/L (ref 23–29)
CO2 SERPL-SCNC: 23 MMOL/L (ref 23–29)
CREAT SERPL-MCNC: 7.6 MG/DL (ref 0.5–1.4)
CREAT SERPL-MCNC: 8.5 MG/DL (ref 0.5–1.4)
CREAT SERPL-MCNC: 8.6 MG/DL (ref 0.5–1.4)
DELSYS: NORMAL
DIFFERENTIAL METHOD BLD: ABNORMAL
DIFFERENTIAL METHOD BLD: ABNORMAL
EOSINOPHIL # BLD AUTO: 0.1 K/UL (ref 0–0.5)
EOSINOPHIL # BLD AUTO: 0.2 K/UL (ref 0–0.5)
EOSINOPHIL NFR BLD: 1.2 % (ref 0–8)
EOSINOPHIL NFR BLD: 2 % (ref 0–8)
ERYTHROCYTE [DISTWIDTH] IN BLOOD BY AUTOMATED COUNT: 18 % (ref 11.5–14.5)
ERYTHROCYTE [DISTWIDTH] IN BLOOD BY AUTOMATED COUNT: 18.2 % (ref 11.5–14.5)
EST. GFR  (NO RACE VARIABLE): 6 ML/MIN/1.73 M^2
EST. GFR  (NO RACE VARIABLE): 6 ML/MIN/1.73 M^2
EST. GFR  (NO RACE VARIABLE): 6.6 ML/MIN/1.73 M^2
ESTIMATED AVG GLUCOSE: 212 MG/DL (ref 68–131)
FIO2: 24
FLOW: 1
GLUCOSE SERPL-MCNC: 213 MG/DL (ref 70–110)
GLUCOSE SERPL-MCNC: 217 MG/DL (ref 70–110)
GLUCOSE SERPL-MCNC: 281 MG/DL (ref 70–110)
HBA1C MFR BLD: 9 % (ref 4.5–6.2)
HCG INTACT+B SERPL-ACNC: <1.2 MIU/ML
HCO3 UR-SCNC: 24.6 MMOL/L (ref 24–28)
HCT VFR BLD AUTO: 28.2 % (ref 37–48.5)
HCT VFR BLD AUTO: 30.5 % (ref 37–48.5)
HGB BLD-MCNC: 10.3 G/DL (ref 12–16)
HGB BLD-MCNC: 9.8 G/DL (ref 12–16)
IMM GRANULOCYTES # BLD AUTO: 0.05 K/UL (ref 0–0.04)
IMM GRANULOCYTES # BLD AUTO: 0.06 K/UL (ref 0–0.04)
IMM GRANULOCYTES NFR BLD AUTO: 0.6 % (ref 0–0.5)
IMM GRANULOCYTES NFR BLD AUTO: 0.6 % (ref 0–0.5)
LIPASE SERPL-CCNC: 21 U/L (ref 4–60)
LIPASE SERPL-CCNC: 23 U/L (ref 4–60)
LYMPHOCYTES # BLD AUTO: 0.8 K/UL (ref 1–4.8)
LYMPHOCYTES # BLD AUTO: 1 K/UL (ref 1–4.8)
LYMPHOCYTES NFR BLD: 11.6 % (ref 18–48)
LYMPHOCYTES NFR BLD: 8.2 % (ref 18–48)
MCH RBC QN AUTO: 29.3 PG (ref 27–31)
MCH RBC QN AUTO: 29.3 PG (ref 27–31)
MCHC RBC AUTO-ENTMCNC: 33.8 G/DL (ref 32–36)
MCHC RBC AUTO-ENTMCNC: 34.8 G/DL (ref 32–36)
MCV RBC AUTO: 84 FL (ref 82–98)
MCV RBC AUTO: 87 FL (ref 82–98)
MODE: NORMAL
MONOCYTES # BLD AUTO: 0.7 K/UL (ref 0.3–1)
MONOCYTES # BLD AUTO: 0.8 K/UL (ref 0.3–1)
MONOCYTES NFR BLD: 7.6 % (ref 4–15)
MONOCYTES NFR BLD: 7.7 % (ref 4–15)
NEUTROPHILS # BLD AUTO: 6.7 K/UL (ref 1.8–7.7)
NEUTROPHILS # BLD AUTO: 8.4 K/UL (ref 1.8–7.7)
NEUTROPHILS NFR BLD: 77.7 % (ref 38–73)
NEUTROPHILS NFR BLD: 81.8 % (ref 38–73)
NRBC BLD-RTO: 0 /100 WBC
NRBC BLD-RTO: 0 /100 WBC
OHS QRS DURATION: 80 MS
OHS QTC CALCULATION: 508 MS
PCO2 BLDA: 43.9 MMHG (ref 35–45)
PH SMN: 7.36 [PH] (ref 7.35–7.45)
PLATELET # BLD AUTO: 245 K/UL (ref 150–450)
PLATELET # BLD AUTO: 257 K/UL (ref 150–450)
PMV BLD AUTO: 11 FL (ref 9.2–12.9)
PMV BLD AUTO: 9.9 FL (ref 9.2–12.9)
PO2 BLDA: 70 MMHG (ref 50–70)
POC BE: -1 MMOL/L
POC SATURATED O2: 93 % (ref 95–100)
POC TCO2: 26 MMOL/L (ref 23–27)
POCT GLUCOSE: 112 MG/DL (ref 70–110)
POCT GLUCOSE: 161 MG/DL (ref 70–110)
POCT GLUCOSE: 205 MG/DL (ref 70–110)
POCT GLUCOSE: 206 MG/DL (ref 70–110)
POCT GLUCOSE: 208 MG/DL (ref 70–110)
POTASSIUM SERPL-SCNC: 4.1 MMOL/L (ref 3.5–5.1)
POTASSIUM SERPL-SCNC: 4.1 MMOL/L (ref 3.5–5.1)
POTASSIUM SERPL-SCNC: 4.3 MMOL/L (ref 3.5–5.1)
PROT SERPL-MCNC: 7.4 G/DL (ref 6–8.4)
PROT SERPL-MCNC: 7.5 G/DL (ref 6–8.4)
RBC # BLD AUTO: 3.34 M/UL (ref 4–5.4)
RBC # BLD AUTO: 3.52 M/UL (ref 4–5.4)
SAMPLE: NORMAL
SITE: NORMAL
SODIUM SERPL-SCNC: 136 MMOL/L (ref 136–145)
SODIUM SERPL-SCNC: 137 MMOL/L (ref 136–145)
SODIUM SERPL-SCNC: 139 MMOL/L (ref 136–145)
SP02: 100
WBC # BLD AUTO: 10.24 K/UL (ref 3.9–12.7)
WBC # BLD AUTO: 8.57 K/UL (ref 3.9–12.7)

## 2024-09-10 PROCEDURE — 96361 HYDRATE IV INFUSION ADD-ON: CPT

## 2024-09-10 PROCEDURE — 99283 EMERGENCY DEPT VISIT LOW MDM: CPT | Mod: 25

## 2024-09-10 PROCEDURE — 84702 CHORIONIC GONADOTROPIN TEST: CPT | Performed by: STUDENT IN AN ORGANIZED HEALTH CARE EDUCATION/TRAINING PROGRAM

## 2024-09-10 PROCEDURE — 94760 N-INVAS EAR/PLS OXIMETRY 1: CPT | Mod: XB

## 2024-09-10 PROCEDURE — 96375 TX/PRO/DX INJ NEW DRUG ADDON: CPT

## 2024-09-10 PROCEDURE — 99900035 HC TECH TIME PER 15 MIN (STAT)

## 2024-09-10 PROCEDURE — 83690 ASSAY OF LIPASE: CPT | Mod: 91 | Performed by: STUDENT IN AN ORGANIZED HEALTH CARE EDUCATION/TRAINING PROGRAM

## 2024-09-10 PROCEDURE — 82803 BLOOD GASES ANY COMBINATION: CPT

## 2024-09-10 PROCEDURE — 96365 THER/PROPH/DIAG IV INF INIT: CPT | Mod: 59

## 2024-09-10 PROCEDURE — 80053 COMPREHEN METABOLIC PANEL: CPT | Performed by: EMERGENCY MEDICINE

## 2024-09-10 PROCEDURE — 99291 CRITICAL CARE FIRST HOUR: CPT

## 2024-09-10 PROCEDURE — 82010 KETONE BODYS QUAN: CPT | Performed by: STUDENT IN AN ORGANIZED HEALTH CARE EDUCATION/TRAINING PROGRAM

## 2024-09-10 PROCEDURE — 94761 N-INVAS EAR/PLS OXIMETRY MLT: CPT | Mod: XB

## 2024-09-10 PROCEDURE — 94799 UNLISTED PULMONARY SVC/PX: CPT

## 2024-09-10 PROCEDURE — 80048 BASIC METABOLIC PNL TOTAL CA: CPT

## 2024-09-10 PROCEDURE — 63600175 PHARM REV CODE 636 W HCPCS

## 2024-09-10 PROCEDURE — 82962 GLUCOSE BLOOD TEST: CPT

## 2024-09-10 PROCEDURE — 25000003 PHARM REV CODE 250: Performed by: STUDENT IN AN ORGANIZED HEALTH CARE EDUCATION/TRAINING PROGRAM

## 2024-09-10 PROCEDURE — 83036 HEMOGLOBIN GLYCOSYLATED A1C: CPT

## 2024-09-10 PROCEDURE — 83690 ASSAY OF LIPASE: CPT | Performed by: EMERGENCY MEDICINE

## 2024-09-10 PROCEDURE — 36415 COLL VENOUS BLD VENIPUNCTURE: CPT

## 2024-09-10 PROCEDURE — 85025 COMPLETE CBC W/AUTO DIFF WBC: CPT | Performed by: EMERGENCY MEDICINE

## 2024-09-10 PROCEDURE — S5010 5% DEXTROSE AND 0.45% SALINE: HCPCS | Performed by: STUDENT IN AN ORGANIZED HEALTH CARE EDUCATION/TRAINING PROGRAM

## 2024-09-10 PROCEDURE — 20600001 HC STEP DOWN PRIVATE ROOM

## 2024-09-10 PROCEDURE — 36415 COLL VENOUS BLD VENIPUNCTURE: CPT | Performed by: STUDENT IN AN ORGANIZED HEALTH CARE EDUCATION/TRAINING PROGRAM

## 2024-09-10 PROCEDURE — G0378 HOSPITAL OBSERVATION PER HR: HCPCS

## 2024-09-10 PROCEDURE — 80177 DRUG SCRN QUAN LEVETIRACETAM: CPT

## 2024-09-10 PROCEDURE — 25000003 PHARM REV CODE 250

## 2024-09-10 PROCEDURE — 96360 HYDRATION IV INFUSION INIT: CPT | Mod: 59

## 2024-09-10 PROCEDURE — 85025 COMPLETE CBC W/AUTO DIFF WBC: CPT | Mod: 91 | Performed by: STUDENT IN AN ORGANIZED HEALTH CARE EDUCATION/TRAINING PROGRAM

## 2024-09-10 PROCEDURE — 80053 COMPREHEN METABOLIC PANEL: CPT | Mod: 91 | Performed by: STUDENT IN AN ORGANIZED HEALTH CARE EDUCATION/TRAINING PROGRAM

## 2024-09-10 PROCEDURE — 63600175 PHARM REV CODE 636 W HCPCS: Performed by: STUDENT IN AN ORGANIZED HEALTH CARE EDUCATION/TRAINING PROGRAM

## 2024-09-10 PROCEDURE — 36416 COLLJ CAPILLARY BLOOD SPEC: CPT

## 2024-09-10 PROCEDURE — 25000003 PHARM REV CODE 250: Performed by: EMERGENCY MEDICINE

## 2024-09-10 RX ORDER — BRIMONIDINE TARTRATE 1.5 MG/ML
1 SOLUTION/ DROPS OPHTHALMIC 3 TIMES DAILY
Status: DISCONTINUED | OUTPATIENT
Start: 2024-09-10 | End: 2024-09-14 | Stop reason: HOSPADM

## 2024-09-10 RX ORDER — ACETAMINOPHEN 325 MG/1
650 TABLET ORAL EVERY 4 HOURS PRN
Status: DISCONTINUED | OUTPATIENT
Start: 2024-09-10 | End: 2024-09-14 | Stop reason: HOSPADM

## 2024-09-10 RX ORDER — DEXTROSE MONOHYDRATE AND SODIUM CHLORIDE 5; .45 G/100ML; G/100ML
125 INJECTION, SOLUTION INTRAVENOUS CONTINUOUS PRN
Status: DISCONTINUED | OUTPATIENT
Start: 2024-09-10 | End: 2024-09-11

## 2024-09-10 RX ORDER — ATROPINE SULFATE 10 MG/ML
1 SOLUTION/ DROPS OPHTHALMIC 2 TIMES DAILY
Status: DISCONTINUED | OUTPATIENT
Start: 2024-09-10 | End: 2024-09-14 | Stop reason: HOSPADM

## 2024-09-10 RX ORDER — LORAZEPAM 2 MG/ML
2 INJECTION INTRAMUSCULAR
Status: COMPLETED | OUTPATIENT
Start: 2024-09-10 | End: 2024-09-10

## 2024-09-10 RX ORDER — SODIUM CHLORIDE 0.9 % (FLUSH) 0.9 %
10 SYRINGE (ML) INJECTION
Status: DISCONTINUED | OUTPATIENT
Start: 2024-09-10 | End: 2024-09-14 | Stop reason: HOSPADM

## 2024-09-10 RX ORDER — ACETAMINOPHEN 325 MG/1
650 TABLET ORAL
Status: COMPLETED | OUTPATIENT
Start: 2024-09-10 | End: 2024-09-10

## 2024-09-10 RX ORDER — PANTOPRAZOLE SODIUM 40 MG/10ML
40 INJECTION, POWDER, LYOPHILIZED, FOR SOLUTION INTRAVENOUS DAILY
Status: DISCONTINUED | OUTPATIENT
Start: 2024-09-10 | End: 2024-09-14

## 2024-09-10 RX ORDER — DEXTROSE MONOHYDRATE AND SODIUM CHLORIDE 5; .45 G/100ML; G/100ML
INJECTION, SOLUTION INTRAVENOUS CONTINUOUS PRN
Status: DISCONTINUED | OUTPATIENT
Start: 2024-09-10 | End: 2024-09-13

## 2024-09-10 RX ORDER — HYDRALAZINE HYDROCHLORIDE 20 MG/ML
20 INJECTION INTRAMUSCULAR; INTRAVENOUS EVERY 6 HOURS PRN
Status: DISCONTINUED | OUTPATIENT
Start: 2024-09-10 | End: 2024-09-11

## 2024-09-10 RX ORDER — SODIUM CHLORIDE 9 MG/ML
125 INJECTION, SOLUTION INTRAVENOUS CONTINUOUS
Status: DISCONTINUED | OUTPATIENT
Start: 2024-09-10 | End: 2024-09-11

## 2024-09-10 RX ORDER — LORAZEPAM 2 MG/ML
1 INJECTION INTRAMUSCULAR EVERY 6 HOURS PRN
Status: DISCONTINUED | OUTPATIENT
Start: 2024-09-10 | End: 2024-09-11

## 2024-09-10 RX ORDER — ONDANSETRON HYDROCHLORIDE 2 MG/ML
4 INJECTION, SOLUTION INTRAVENOUS EVERY 6 HOURS PRN
Status: DISCONTINUED | OUTPATIENT
Start: 2024-09-10 | End: 2024-09-10

## 2024-09-10 RX ORDER — HYDRALAZINE HYDROCHLORIDE 25 MG/1
50 TABLET, FILM COATED ORAL
Status: COMPLETED | OUTPATIENT
Start: 2024-09-10 | End: 2024-09-10

## 2024-09-10 RX ORDER — ONDANSETRON HYDROCHLORIDE 2 MG/ML
4 INJECTION, SOLUTION INTRAVENOUS EVERY 6 HOURS PRN
Status: DISCONTINUED | OUTPATIENT
Start: 2024-09-10 | End: 2024-09-14 | Stop reason: HOSPADM

## 2024-09-10 RX ORDER — LORAZEPAM 2 MG/ML
1 INJECTION INTRAMUSCULAR EVERY 4 HOURS PRN
Status: DISCONTINUED | OUTPATIENT
Start: 2024-09-10 | End: 2024-09-10

## 2024-09-10 RX ORDER — SODIUM CHLORIDE 9 MG/ML
1000 INJECTION, SOLUTION INTRAVENOUS CONTINUOUS
Status: DISCONTINUED | OUTPATIENT
Start: 2024-09-10 | End: 2024-09-11

## 2024-09-10 RX ORDER — IPRATROPIUM BROMIDE AND ALBUTEROL SULFATE 2.5; .5 MG/3ML; MG/3ML
3 SOLUTION RESPIRATORY (INHALATION) EVERY 6 HOURS PRN
Status: DISCONTINUED | OUTPATIENT
Start: 2024-09-10 | End: 2024-09-14 | Stop reason: HOSPADM

## 2024-09-10 RX ORDER — LEVETIRACETAM 500 MG/5ML
500 INJECTION, SOLUTION, CONCENTRATE INTRAVENOUS EVERY 12 HOURS
Status: DISCONTINUED | OUTPATIENT
Start: 2024-09-10 | End: 2024-09-11

## 2024-09-10 RX ADMIN — ACETAMINOPHEN 650 MG: 325 TABLET ORAL at 01:09

## 2024-09-10 RX ADMIN — ATROPINE SULFATE 1 DROP: 10 SOLUTION OPHTHALMIC at 10:09

## 2024-09-10 RX ADMIN — BRIMONIDINE TARTRATE 1 DROP: 1.5 SOLUTION OPHTHALMIC at 10:09

## 2024-09-10 RX ADMIN — PROMETHAZINE HYDROCHLORIDE 12.5 MG: 25 INJECTION INTRAMUSCULAR; INTRAVENOUS at 05:09

## 2024-09-10 RX ADMIN — SODIUM CHLORIDE 0.05 UNITS/KG/HR: 9 INJECTION, SOLUTION INTRAVENOUS at 08:09

## 2024-09-10 RX ADMIN — LEVETIRACETAM 500 MG: 100 INJECTION, SOLUTION INTRAVENOUS at 09:09

## 2024-09-10 RX ADMIN — PANTOPRAZOLE SODIUM 40 MG: 40 INJECTION, POWDER, LYOPHILIZED, FOR SOLUTION INTRAVENOUS at 09:09

## 2024-09-10 RX ADMIN — DEXTROSE AND SODIUM CHLORIDE: 5; 450 INJECTION, SOLUTION INTRAVENOUS at 08:09

## 2024-09-10 RX ADMIN — LORAZEPAM 2 MG: 2 INJECTION INTRAMUSCULAR; INTRAVENOUS at 05:09

## 2024-09-10 RX ADMIN — HYDRALAZINE HYDROCHLORIDE 50 MG: 25 TABLET ORAL at 01:09

## 2024-09-10 RX ADMIN — SODIUM CHLORIDE, POTASSIUM CHLORIDE, SODIUM LACTATE AND CALCIUM CHLORIDE 500 ML: 600; 310; 30; 20 INJECTION, SOLUTION INTRAVENOUS at 05:09

## 2024-09-10 NOTE — ED PROVIDER NOTES
"Chief complaint:  Flank Pain (Pt sent from dialysis for bilat flank pain. With vomiting. States pain is better with movement. )      HPI:  Tabby Howard is a 34 y.o. female with hx IDDM, SVT, CHF, ESRD on HD Tu/Th/Sat presenting with ongoing, unchanged left flank pain.  Patient has history dialysis and medication noncompliance.  She has had multiple visits for flank pain going on at least for weeks with prior workup for the same without focal etiology discovered.  CT-AP 8/28/24 for flank pain w/o acute process most recently.  Patient denies radiation or migration of pain.  She endorses having seen PCP for this very issue and being referred to pain management with whom she has not yet followed up.  She denies associated fever.  She is anuric and makes no urine and denies any urinary complaints.  There is no associated numbness or weakness.  She denies history of IV drug use or malignancy.  There is no associated saddle anesthesia.  There is no history of trauma.    ROS: As per HPI and below:  No chest pain, dyspnea, pleuritic pain, syncope, swelling, rash, cough.    Review of patient's allergies indicates:   Allergen Reactions    Droperidol Hives    Penicillins Hives    Droperidol (bulk) Anxiety     STATES "FREAKS OUT".  TOLERATES HALDOL PRIOR TO ARRIVAL AT ANOTHER FACILITY TODAY.        Discharge Medication List as of 9/10/2024  2:32 PM        CONTINUE these medications which have NOT CHANGED    Details   acetaZOLAMIDE (DIAMOX) 250 MG tablet take 1 tablet by mouth 3 times a day, Starting Fri 5/31/2024, Normal      albuterol (VENTOLIN HFA) 90 mcg/actuation inhaler Inhale 2 puffs every 4 hours by inhalation route., Starting Mon 6/24/2024, Normal      apixaban (ELIQUIS) 5 mg Tab Take 1 tablet (5 mg total) by mouth 2 (two) times daily. Patient w/ thrombocytopenia <50, so held during admission, follow-up with hematologist about restarting, Starting Wed 2/28/2024, Normal      atropine 1% (ISOPTO ATROPINE) 1 % Drop Place " 1 drop into the left eye 2 (two) times a day., Starting Thu 3/14/2024, Normal      blood sugar diagnostic (TRUE METRIX GLUCOSE TEST STRIP) Strp Use 1 strip to check blood glucose three times daily, Starting Thu 10/5/2023, Normal      blood-glucose meter (TRUE METRIX GLUCOSE METER) Misc Use to check blood glucose, Starting Sat 8/31/2024, Normal      blood-glucose meter,continuous Misc USE WITH SENSORS, Starting Sat 8/31/2024, Normal      !! blood-glucose sensor (DEXCOM G7 SENSOR) Heather Use as directed for CGM. Change sensor every 10 days, Starting Sat 8/31/2024, Normal      !! blood-glucose sensor [place] CHANGE EVERY 10 DAYS, Starting Wed 2/28/2024, Normal      brimonidine 0.15 % OPTH DROP (ALPHAGAN) 0.15 % ophthalmic solution Place 1 drop into both eyes 3 (three) times daily., Starting Wed 2/28/2024, Until Thu 2/27/2025, Normal      brinzolamide (AZOPT) 1 % ophthalmic suspension Place1 drop in left eye 3 times a day for 30 days, Starting Fri 5/31/2024, Normal      busPIRone (BUSPAR) 10 MG tablet Take 1 tablet (10 mg total) by mouth 3 (three) times daily as needed (anxiety)., Starting Wed 2/28/2024, Until Thu 2/27/2025 at 2359, Normal      cetirizine (ZYRTEC) 10 MG tablet Take 1 tablet every day by oral route., Starting Wed 2/21/2024, Normal      dorzolamide-timolol 2-0.5% (COSOPT) 22.3-6.8 mg/mL ophthalmic solution place 1 drop in left eye twice a day  for 30 days, Starting Tue 4/9/2024, Normal      FLUoxetine 40 MG capsule Take 1 capsule every day by oral route., Starting Mon 6/24/2024, Normal      fluticasone propionate (FLONASE ALLERGY RELIEF) 50 mcg/actuation nasal spray [place] 1 spray every day by intranasal route., Starting Mon 12/11/2023, Normal      gabapentin (NEURONTIN) 300 MG capsule Take 2 capsules twice a day by oral route for 90 days., Starting Tue 8/20/2024, Normal      hydrALAZINE (APRESOLINE) 50 MG tablet Take 1 tablet (50 mg total) by mouth every 8 (eight) hours., Starting Sat 8/31/2024, Until Fri  "10/4/2024, Normal      insulin aspart U-100 (NOVOLOG) 100 unit/mL (3 mL) InPn pen Inject 8 Units into the skin 3 (three) times daily with meals., Starting Mon 8/26/2024, Normal      insulin glargine U-100, Lantus, (LANTUS SOLOSTAR U-100 INSULIN) 100 unit/mL (3 mL) InPn pen Inject 22 units every day by subcutaneous route in the evening for 30 days, for diabetes., Starting Tue 8/6/2024, Normal      lancets (TRUEPLUS LANCETS) 33 gauge Misc Use 1 to check blood glucose three times daily, Starting Thu 10/5/2023, Normal      levETIRAcetam (KEPPRA) 500 MG Tab Take 1 tablet (500 mg total) by mouth 2 (two) times daily., Starting Wed 2/28/2024, Until Thu 2/27/2025, Normal      LORazepam (ATIVAN) 0.5 MG tablet Take 1 tablet 3 times a day by oral route for 7 days, for severe panic., Starting Mon 6/24/2024, Normal      ondansetron (ZOFRAN-ODT) 4 MG TbDL Place 1 tablet (4 mg) on or under the tongue every 8 hours for 10 days., Starting Wed 2/21/2024, Normal      oxyCODONE-acetaminophen (PERCOCET)  mg per tablet Take 1 tablet by mouth every 4 (four) hours as needed for Pain., Starting Wed 2/28/2024, Normal      pantoprazole (PROTONIX) 40 MG tablet Take 1 tablet (40 mg total) by mouth once daily., Starting Sun 8/25/2024, Until Fri 10/4/2024, Normal      pen needle, diabetic 31 gauge x 3/16" Ndle Use as directed with insulin once daily, Starting Wed 2/28/2024, Normal      prednisoLONE acetate (PRED FORTE) 1 % DrpS Place 1 drop into the left eye 2 (two) times daily., Starting Thu 3/14/2024, Normal      promethazine (PHENERGAN) 25 MG tablet Take 1 tablet (25 mg total) by mouth every 6 (six) hours as needed., Starting Mon 8/5/2024, Normal      sevelamer carbonate (RENVELA) 800 mg Tab Take 1 tablet (800 mg total) by mouth 3 (three) times daily with meals., Starting Wed 2/28/2024, Normal      sucralfate (CARAFATE) 1 gram tablet Take 1 tablet (1 g total) by mouth 4 (four) times daily., Starting Mon 8/5/2024, Normal      timolol " maleate 0.5% (TIMOPTIC) 0.5 % Drop Place 1 drop in left eye 2 times a day for 30 days, Starting 2024, Normal       !! - Potential duplicate medications found. Please discuss with provider.          PMH:  As per HPI and below:  Past Medical History:   Diagnosis Date    ESRD on hemodialysis 2022    Gastritis 2022    EGD was 22    Gastroparesis 2022    has not had Emptying study    Heart failure with preserved ejection fraction 2022    EF 55% on 3/22    History of supraventricular tachycardia     Hyperkalemia 2022    Hypertensive emergency 2022    Sickle cell trait 2022    Type 2 diabetes mellitus      Past Surgical History:   Procedure Laterality Date     SECTION      x 3    COLONOSCOPY      COLONOSCOPY N/A 2022    Procedure: COLONOSCOPY;  Surgeon: Jagdeep Cedeno MD;  Location: [facility];  Service: Endoscopy;  Laterality: N/A;    DESTRUCTION, CILIARY BODY, USING LASER Left 2024    Procedure: Cyclophotocoagulation G-probe, retrobulbar chlorpromazine left eye;  Surgeon: Fidel Wise MD;  Location: [address];  Service: Ophthalmology;  Laterality: Left;    ENDOSCOPIC ULTRASOUND OF UPPER GASTROINTESTINAL TRACT N/A 2023    Procedure: ULTRASOUND, UPPER GI TRACT, ENDOSCOPIC;  Surgeon: Micky Paredes III, MD;  Location: [facility];  Service: Endoscopy;  Laterality: N/A;    ESOPHAGOGASTRODUODENOSCOPY N/A 10/18/2019    Procedure: ESOPHAGOGASTRODUODENOSCOPY (EGD);  Surgeon: Gianluca Mendez MD;  Location: [facility];  Service: Endoscopy;  Laterality: N/A;    ESOPHAGOGASTRODUODENOSCOPY N/A 2022    Procedure: EGD (ESOPHAGOGASTRODUODENOSCOPY);  Surgeon: Micky Paredes III, MD;  Location: [facility];  Service: Endoscopy;  Laterality: N/A;    ESOPHAGOGASTRODUODENOSCOPY N/A 2022    Procedure: EGD (ESOPHAGOGASTRODUODENOSCOPY);  Surgeon: Marcelo Zhong MD;  Location: [facility];  Service: Endoscopy;  Laterality: N/A;    EYE  SURGERY      INSERTION, CATHETER, TUNNELED N/A 06/17/2023    Procedure: Insertion,catheter,tunneled;  Surgeon: Carlos Thurman Jr., MD;  Location: [facility] OR;  Service: General;  Laterality: N/A;    LAPAROSCOPIC CHOLECYSTECTOMY N/A 07/30/2022    Procedure: CHOLECYSTECTOMY, LAPAROSCOPIC;  Surgeon: Grey Perez MD;  Location: [facility] OR;  Service: General;  Laterality: N/A;    PLACEMENT OF DUAL-LUMEN VASCULAR CATHETER Left 07/12/2022    Procedure: INSERTION-CATHETER-JOSEPH;  Surgeon: Dionte Gan MD;  Location: [facility] OR;  Service: General;  Laterality: Left;    PLACEMENT OF DUAL-LUMEN VASCULAR CATHETER Right 07/26/2022    Procedure: INSERTION-CATHETER-Hemosplit;  Surgeon: Dionte Gan MD;  Location: [facility] OR;  Service: General;  Laterality: Right;       Social History     Socioeconomic History    Marital status:    Tobacco Use    Smoking status: Never    Smokeless tobacco: Never   Substance and Sexual Activity    Alcohol use: Not Currently    Drug use: No    Sexual activity: Not Currently     Partners: Male     Birth control/protection: I.U.D.     Social Determinants of Health     Financial Resource Strain: Low Risk  (8/29/2024)    Overall Financial Resource Strain (CARDIA)     Difficulty of Paying Living Expenses: Not hard at all   Recent Concern: Financial Resource Strain - Medium Risk (8/25/2024)    Overall Financial Resource Strain (CARDIA)     Difficulty of Paying Living Expenses: Somewhat hard   Food Insecurity: No Food Insecurity (8/29/2024)    Hunger Vital Sign     Worried About Running Out of Food in the Last Year: Never true     Ran Out of Food in the Last Year: Never true   Recent Concern: Food Insecurity - Food Insecurity Present (8/25/2024)    Hunger Vital Sign     Worried About Running Out of Food in the Last Year: Often true     Ran Out of Food in the Last Year: Often true   Transportation Needs: No Transportation Needs (8/29/2024)    TRANSPORTATION NEEDS     Transportation : No    Physical Activity: Sufficiently Active (8/29/2024)    Exercise Vital Sign     Days of Exercise per Week: 5 days     Minutes of Exercise per Session: 30 min   Recent Concern: Physical Activity - Inactive (8/25/2024)    Exercise Vital Sign     Days of Exercise per Week: 0 days     Minutes of Exercise per Session: 0 min   Stress: No Stress Concern Present (8/29/2024)    [facility] - Occupational Stress Questionnaire     Feeling of Stress : Not at all   Recent Concern: Stress - Stress Concern Present (8/25/2024)    [facility] Occupational Health - Occupational Stress Questionnaire     Feeling of Stress : Rather much   Housing Stability: Low Risk  (8/29/2024)    Housing Stability Vital Sign     Unable to Pay for Housing in the Last Year: No     Homeless in the Last Year: No   Recent Concern: Housing Stability - High Risk (8/25/2024)    Housing Stability Vital Sign     Unable to Pay for Housing in the Last Year: Yes     Homeless in the Last Year: No       Family History   Problem Relation Name Age of Onset    Diabetes Mother      Diabetes Father         Physical Exam:    Vitals:    09/10/24 1500   BP: (!) 195/96   Pulse: 96   Resp: 20   Temp: 99.3 °F (37.4 °C)     GENERAL:  No apparent distress.  Alert.    HEENT:  Moist mucous membranes.  Normocephalic and atraumatic.    NECK:  No swelling.  Midline trachea.   CARDIOVASCULAR:  Regular rate and rhythm.  2+ radial pulses.  No murmur.  PULMONARY:  Lungs clear to auscultation bilaterally.  No wheezes, rales, or rhonci.  Unlabored respirations.  ABDOMEN:  Non-tender and non-distended.    EXTREMITIES:  Warm and well perfused.  Brisk capillary refill.    NEUROLOGICAL:  Normal mental status.  Appropriate and conversant.  5/5 strength with equal sensation light touch in the distal lower extremities bilaterally.  SKIN:  No rashes or ecchymoses.    BACK:  Atraumatic.  No CVA or other reproducible back tenderness to palpation.      Labs  Reviewed   CBC W/ AUTO DIFFERENTIAL - Abnormal       Result Value    WBC 8.57      RBC 3.52 (*)     Hemoglobin 10.3 (*)     Hematocrit 30.5 (*)     MCV 87      MCH 29.3      MCHC 33.8      RDW 18.0 (*)     Platelets 257      MPV 9.9      Immature Granulocytes 0.6 (*)     Gran # (ANC) 6.7      Immature Grans (Abs) 0.05 (*)     Lymph # 1.0      Mono # 0.7      Eos # 0.2      Baso # 0.04      nRBC 0      Gran % 77.7 (*)     Lymph % 11.6 (*)     Mono % 7.6      Eosinophil % 2.0      Basophil % 0.5      Differential Method Automated     COMPREHENSIVE METABOLIC PANEL - Abnormal    Sodium 136      Potassium 4.1      Chloride 93 (*)     CO2 22 (*)     Glucose 281 (*)     BUN 30 (*)     Creatinine 7.6 (*)     Calcium 9.5      Total Protein 7.5      Albumin 4.1      Total Bilirubin 1.0      Alkaline Phosphatase 99      AST 18      ALT 14      eGFR 6.6 (*)     Anion Gap 21 (*)    LIPASE    Lipase 23         Discharge Medication List as of 9/10/2024  2:32 PM        CONTINUE these medications which have NOT CHANGED    Details   acetaZOLAMIDE (DIAMOX) 250 MG tablet take 1 tablet by mouth 3 times a day, Starting Fri 5/31/2024, Normal      albuterol (VENTOLIN HFA) 90 mcg/actuation inhaler Inhale 2 puffs every 4 hours by inhalation route., Starting Mon 6/24/2024, Normal      apixaban (ELIQUIS) 5 mg Tab Take 1 tablet (5 mg total) by mouth 2 (two) times daily. Patient w/ thrombocytopenia <50, so held during admission, follow-up with hematologist about restarting, Starting Wed 2/28/2024, Normal      atropine 1% (ISOPTO ATROPINE) 1 % Drop Place 1 drop into the left eye 2 (two) times a day., Starting Thu 3/14/2024, Normal      blood sugar diagnostic (TRUE METRIX GLUCOSE TEST STRIP) Strp Use 1 strip to check blood glucose three times daily, Starting Thu 10/5/2023, Normal      blood-glucose meter (TRUE METRIX GLUCOSE METER) Misc Use to check blood glucose, Starting Sat 8/31/2024, Normal      blood-glucose meter,continuous Misc USE WITH  SENSORS, Starting Sat 8/31/2024, Normal      !! blood-glucose sensor (DEXCOM G7 SENSOR) Heather Use as directed for CGM. Change sensor every 10 days, Starting Sat 8/31/2024, Normal      !! blood-glucose sensor Heather CHANGE EVERY 10 DAYS, Starting Wed 2/28/2024, Normal      brimonidine 0.15 % OPTH DROP (ALPHAGAN) 0.15 % ophthalmic solution Place 1 drop into both eyes 3 (three) times daily., Starting Wed 2/28/2024, Until Thu 2/27/2025, Normal      brinzolamide (AZOPT) 1 % ophthalmic suspension Place1 drop in left eye 3 times a day for 30 days, Starting Fri 5/31/2024, Normal      busPIRone (BUSPAR) 10 MG tablet Take 1 tablet (10 mg total) by mouth 3 (three) times daily as needed (anxiety)., Starting Wed 2/28/2024, Until Thu 2/27/2025 at 2359, Normal      cetirizine (ZYRTEC) 10 MG tablet Take 1 tablet every day by oral route., Starting Wed 2/21/2024, Normal      dorzolamide-timolol 2-0.5% (COSOPT) 22.3-6.8 mg/mL ophthalmic solution place 1 drop in left eye twice a day  for 30 days, Starting Tue 4/9/2024, Normal      FLUoxetine 40 MG capsule Take 1 capsule every day by oral route., Starting Mon 6/24/2024, Normal      fluticasone propionate (FLONASE ALLERGY RELIEF) 50 mcg/actuation nasal spray [place] 1 spray every day by intranasal route., Starting Mon 12/11/2023, Normal      gabapentin (NEURONTIN) 300 MG capsule Take 2 capsules twice a day by oral route for 90 days., Starting Tue 8/20/2024, Normal      hydrALAZINE (APRESOLINE) 50 MG tablet Take 1 tablet (50 mg total) by mouth every 8 (eight) hours., Starting Sat 8/31/2024, Until Fri 10/4/2024, Normal      insulin aspart U-100 (NOVOLOG) 100 unit/mL (3 mL) InPn pen Inject 8 Units into the skin 3 (three) times daily with meals., Starting Mon 8/26/2024, Normal      insulin glargine U-100, Lantus, (LANTUS SOLOSTAR U-100 INSULIN) 100 unit/mL (3 mL) InPn pen Inject 22 units every day by subcutaneous route in the evening for 30 days, for diabetes., Starting Tue 8/6/2024, Normal     "  lancets (TRUEPLUS LANCETS) 33 gauge Misc Use 1 to check blood glucose three times daily, Starting Thu 10/5/2023, Normal      levETIRAcetam (KEPPRA) 500 MG Tab Take 1 tablet (500 mg total) by mouth 2 (two) times daily., Starting Wed 2/28/2024, Until Thu 2/27/2025, Normal      LORazepam (ATIVAN) 0.5 MG tablet Take 1 tablet 3 times a day by oral route for 7 days, for severe panic., Starting Mon 6/24/2024, Normal      ondansetron (ZOFRAN-ODT) 4 MG TbDL Place 1 tablet (4 mg) on or under the tongue every 8 hours for 10 days., Starting Wed 2/21/2024, Normal      oxyCODONE-acetaminophen (PERCOCET)  mg per tablet Take 1 tablet by mouth every 4 (four) hours as needed for Pain., Starting Wed 2/28/2024, Normal      pantoprazole (PROTONIX) 40 MG tablet Take 1 tablet (40 mg total) by mouth once daily., Starting Sun 8/25/2024, Until Fri 10/4/2024, Normal      pen needle, diabetic 31 gauge x 3/16" Ndle Use as directed with insulin once daily, Starting Wed 2/28/2024, Normal      prednisoLONE acetate (PRED FORTE) 1 % DrpS Place 1 drop into the left eye 2 (two) times daily., Starting Thu 3/14/2024, Normal      promethazine (PHENERGAN) 25 MG tablet Take 1 tablet (25 mg total) by mouth every 6 (six) hours as needed., Starting Mon 8/5/2024, Normal      sevelamer carbonate (RENVELA) 800 mg Tab Take 1 tablet (800 mg total) by mouth 3 (three) times daily with meals., Starting Wed 2/28/2024, Normal      sucralfate (CARAFATE) 1 gram tablet Take 1 tablet (1 g total) by mouth 4 (four) times daily., Starting Mon 8/5/2024, Normal      timolol maleate 0.5% (TIMOPTIC) 0.5 % Drop Place 1 drop in left eye 2 times a day for 30 days, Starting Fri 5/31/2024, Normal       !! - Potential duplicate medications found. Please discuss with provider.          Orders Placed This Encounter   Procedures    CBC auto differential    Comprehensive metabolic panel    Lipase       Imaging Results    None              MDM:    34 y.o. female with ongoing left " back pain near the flank with numerous previous workup for the same without obvious change.  I do not think repeat emergent imaging would be beneficial.  I doubt obstructive uropathy, abscess.  UTI is unlikely as the patient is anuric.  I doubt other life-threatening intra-abdominal process such as atypical diverticulitis, obstruction, perforated viscus.  Laboratories assessed patient does not appear volume overloaded.  There is no marked electrolyte derangement.  I do not think she requires admission for dialysis and should follow up for next scheduled dialysis.  She was given acetaminophen here.  Blood pressure elevated appropriately decreasing with hydralazine that she did not take today.  I doubt hypertensive emergency.  I explained that further analgesia must be at the discretion of pain management and I do not think further opiate analgesia is appropriate at this point.  Patient states if she does not receive opioids, I will just go to another hospital.   I have encouraged her to closely follow up with PCP as well as pain management as previously recommended as well as to next scheduled dialysis.  Return precautions reviewed.    Diagnoses:    1. Left flank pain       Kaushik Patton MD  09/10/24 [id number]

## 2024-09-10 NOTE — RESPIRATORY THERAPY
Latest Reference Range & Units 09/10/24 18:14   POC PH 7.35 - 7.45  7.357   POC PCO2 35 - 45 mmHg 43.9   POC PO2 50 - 70 mmHg 70   POC HCO3 24 - 28 mmol/L 24.6   POC SATURATED O2 95 - 100 % 93   Sample  CAPILLARY   POC TCO2 23 - 27 mmol/L 26   POC BE -2 to 2 mmol/L -1   FiO2  24   Flow  1   DelSys  Nasal Can   Site  RF   Mode  SPONT

## 2024-09-10 NOTE — ED NOTES
Informed MD Abdi that the patient is refusing a catheter, and is requesting pain medication. No orders given or placed.

## 2024-09-10 NOTE — ED NOTES
Pt's step mother called to inform about pt being ready for discharge. She stated she would notify pt's father.

## 2024-09-10 NOTE — ED PROVIDER NOTES
"Encounter Date: 9/10/2024       History     Chief Complaint   Patient presents with    Flank Pain     Bilateral flank pain, was seen at [facility] and discharged within the hour, states "I got tylenol but I need dilaudid or morphine, I can't handle the pain"      CHAYA Howard is a 34 y.o. female with a past medical history of insulin-dependent type 2 diabetes complicated by questionable gastroparesis, hypertension, ESRD with last dialysis on earlier today, and chronic abdominal pain that presented emergency department for left-sided flank pain.  Per chart review, patient has multiple similar episodes in the past and has had multiple previous CT scans performed.  Also has had multiple hospitalizations for DKA in the past.  Patient had dialysis earlier today, and following this, she had bilateral flank pain.  Left was worse than right.  She states that she has been taking her long-acting insulin.  She initially presented to [facility].  She had some lab work performed there which did show an anion gap with hyperglycemia that was elevated.  At the outside facility, she repeatedly requested opioid analgesia which was not indicated at that time.  Patient then stated that she would leave and go to another hospital.  [facility] and came to [facility]  Upon arrival, complaining of bilateral with left-greater-than-right flank pain.  States that she has been using her insulin although she has a long history of insulin noncompliance.  Denies fever, chest pain, shortness of breath, numbness, weakness, vomiting, and changes in bowel habits.    Review of patient's allergies indicates:   Allergen Reactions    Droperidol Hives    Penicillins Hives    Droperidol (bulk) Anxiety     STATES "FREAKS OUT".  TOLERATES HALDOL PRIOR TO ARRIVAL AT ANOTHER FACILITY TODAY.      Past Medical History:   Diagnosis Date    ESRD on hemodialysis 04/12/2022    Gastritis 12/2022    EGD was 12/5/22    Gastroparesis 04/12/2022    " has not had Emptying study    Heart failure with preserved ejection fraction 2022    EF 55% on 3/22    History of supraventricular tachycardia     Hyperkalemia 2022    Hypertensive emergency 2022    Sickle cell trait 2022    Type 2 diabetes mellitus      Past Surgical History:   Procedure Laterality Date     SECTION      x 3    COLONOSCOPY      COLONOSCOPY N/A 2022    Procedure: COLONOSCOPY;  Surgeon: Jagdeep Cedeno MD;  Location: [facility];  Service: Endoscopy;  Laterality: N/A;    DESTRUCTION, CILIARY BODY, USING LASER Left 2024    Procedure: Cyclophotocoagulation G-probe, retrobulbar chlorpromazine left eye;  Surgeon: Fidel Wise MD;  Location: [address];  Service: Ophthalmology;  Laterality: Left;    ENDOSCOPIC ULTRASOUND OF UPPER GASTROINTESTINAL TRACT N/A 2023    Procedure: ULTRASOUND, UPPER GI TRACT, ENDOSCOPIC;  Surgeon: Micky Paredes III, MD;  Location: [facility];  Service: Endoscopy;  Laterality: N/A;    ESOPHAGOGASTRODUODENOSCOPY N/A 10/18/2019    Procedure: ESOPHAGOGASTRODUODENOSCOPY (EGD);  Surgeon: Gianluca Mendez MD;  Location: [facility];  Service: Endoscopy;  Laterality: N/A;    ESOPHAGOGASTRODUODENOSCOPY N/A 2022    Procedure: EGD (ESOPHAGOGASTRODUODENOSCOPY);  Surgeon: Micky Paredes III, MD;  Location: [facility];  Service: Endoscopy;  Laterality: N/A;    ESOPHAGOGASTRODUODENOSCOPY N/A 2022    Procedure: EGD (ESOPHAGOGASTRODUODENOSCOPY);  Surgeon: Marcelo Zhong MD;  Location: [facility] ENDO;  Service: Endoscopy;  Laterality: N/A;    EYE SURGERY      INSERTION, CATHETER, TUNNELED N/A 2023    Procedure: Insertion,catheter,tunneled;  Surgeon: Carlos Thurman Jr., MD;  Location: [facility] OR;  Service: General;  Laterality: N/A;    LAPAROSCOPIC CHOLECYSTECTOMY N/A 2022    Procedure: CHOLECYSTECTOMY, LAPAROSCOPIC;  Surgeon: Grey Perez MD;  Location: [facility] OR;  Service: General;  Laterality: N/A;     PLACEMENT OF DUAL-LUMEN VASCULAR CATHETER Left 07/12/2022    Procedure: INSERTION-CATHETER-JOSEPH;  Surgeon: Dionte Gan MD;  Location: [facility] OR;  Service: General;  Laterality: Left;    PLACEMENT OF DUAL-LUMEN VASCULAR CATHETER Right 07/26/2022    Procedure: INSERTION-CATHETER-Hemosplit;  Surgeon: Dionte Gan MD;  Location: [facility] OR;  Service: General;  Laterality: Right;     Family History   Problem Relation Name Age of Onset    Diabetes Mother      Diabetes Father       Social History     Tobacco Use    Smoking status: Never    Smokeless tobacco: Never   Substance Use Topics    Alcohol use: Not Currently    Drug use: No     Review of Systems   Respiratory:  Negative for shortness of breath.    Cardiovascular:  Negative for chest pain.   Gastrointestinal:  Positive for abdominal pain and nausea. Negative for constipation, diarrhea and vomiting.   Genitourinary:  Positive for decreased urine volume (Does not make urine) and flank pain (Bilateral left-greater-than-right).   All other systems reviewed and are negative.      Physical Exam     Initial Vitals [09/10/24 1607]   BP Pulse Resp Temp SpO2   (!) 189/90 100 19 98.1 °F (36.7 °C) 100 %      MAP       --         Physical Exam    Nursing note and vitals reviewed.  Constitutional: She appears well-developed and well-nourished. She appears distressed.   Writhing on the stretcher in pain.   HENT:   Head: Normocephalic and atraumatic.   Eyes: EOM are normal. Pupils are equal, round, and reactive to light.   Neck:   Normal range of motion.  Cardiovascular:  Normal rate, regular rhythm and normal heart sounds.           Pulmonary/Chest: Breath sounds normal. No respiratory distress. She has no wheezes. She has no rhonchi. She has no rales.   Abdominal: Abdomen is soft. She exhibits no distension. There is abdominal tenderness (Left-sided flank tenderness with left CVA tenderness.). There is no rebound.   Musculoskeletal:         General: Normal range of motion.       Cervical back: Normal range of motion.     Neurological: She is alert and oriented to person, place, and time. GCS eye subscore is 4. GCS verbal subscore is 5. GCS motor subscore is 6.   Skin: Capillary refill takes less than 2 seconds. No rash noted.   Psychiatric: She has a normal mood and affect. Thought content normal.         ED Course   Critical Care    Date/Time: 9/10/2024 3:53 PM    Performed by: Korey Morales MD  Authorized by: Korey Morales MD  Direct patient critical care time: 10 minutes  Ordering / reviewing critical care time: 10 minutes  Documentation critical care time: 10 minutes  Consulting other physicians critical care time: 5 minutes  Total critical care time (exclusive of procedural time) : 35 minutes  Critical care was necessary to treat or prevent imminent or life-threatening deterioration of the following conditions: endocrine crisis.  Critical care was time spent personally by me on the following activities: review of old charts, re-evaluation of patient's condition, pulse oximetry, ordering and review of radiographic studies, ordering and review of laboratory studies, ordering and performing treatments and interventions, obtaining history from patient or surrogate, examination of patient, evaluation of patient's response to treatment, interpretation of cardiac output measurements and discussions with consultants.        Labs Reviewed   CBC W/ AUTO DIFFERENTIAL - Abnormal       Result Value    WBC 10.24      RBC 3.34 (*)     Hemoglobin 9.8 (*)     Hematocrit 28.2 (*)     MCV 84      MCH 29.3      MCHC 34.8      RDW 18.2 (*)     Platelets 245      MPV 11.0      Immature Granulocytes 0.6 (*)     Gran # (ANC) 8.4 (*)     Immature Grans (Abs) 0.06 (*)     Lymph # 0.8 (*)     Mono # 0.8      Eos # 0.1      Baso # 0.05      nRBC 0      Gran % 81.8 (*)     Lymph % 8.2 (*)     Mono % 7.7      Eosinophil % 1.2      Basophil % 0.5      Differential Method Automated       Narrative:     Release to patient->Immediate   COMPREHENSIVE METABOLIC PANEL - Abnormal    Sodium 137      Potassium 4.1      Chloride 96      CO2 19 (*)     Glucose 217 (*)     BUN 32 (*)     Creatinine 8.5 (*)     Calcium 9.3      Total Protein 7.4      Albumin 3.4 (*)     Total Bilirubin 1.1 (*)     Alkaline Phosphatase 99      AST 19      ALT 13      eGFR 6 (*)     Anion Gap 22 (*)     Narrative:     Release to patient->Immediate   BETA - HYDROXYBUTYRATE, SERUM - Abnormal    Beta-Hydroxybutyrate 4.1 (*)     Narrative:     Release to patient->Immediate   POCT GLUCOSE - Abnormal    POCT Glucose 205 (*)    POCT GLUCOSE - Abnormal    POCT Glucose 208 (*)    HCG, QUANTITATIVE    HCG Quant <1.2      Narrative:     Release to patient->Immediate   LIPASE    Lipase 21      Narrative:     Release to patient->Immediate   ISTAT PROCEDURE    POC PH 7.357      POC PCO2 43.9      POC PO2 70      POC HCO3 24.6      POC BE -1      POC SATURATED O2 93      POC TCO2 26      Sample CAPILLARY      Site RF      Allens Test N/A      DelSys Nasal Can      Mode SPONT      Flow 1      FiO2 24      Sp02 100     POCT GLUCOSE MONITORING CONTINUOUS          Imaging Results    None          Medications   sodium chloride 0.9% flush 10 mL (has no administration in time range)   0.9%  NaCl infusion (has no administration in time range)   dextrose 5 % and 0.45 % NaCl infusion ( Intravenous New Bag 9/10/24 2023)   insulin regular 1 Units/mL in 0.9% NaCl 100 mL infusion (0.05 Units/kg/hr × 53.1 kg Intravenous New Bag 9/10/24 2022)   dextrose 10% bolus 125 mL 125 mL (has no administration in time range)   dextrose 10% bolus 250 mL 250 mL (has no administration in time range)   LORazepam injection 2 mg (2 mg Intravenous Given 9/10/24 1724)   promethazine (PHENERGAN) 12.5 mg in 0.9% NaCl 50 mL IVPB (0 mg Intravenous Stopped 9/10/24 1808)   lactated ringers bolus 500 mL (0 mLs Intravenous Stopped 9/10/24 1826)     Medical Decision Making  Tabby  Lee is a 34 y.o. female with a past medical history of insulin-dependent type 2 diabetes complicated by questionable gastroparesis, hypertension, ESRD with last dialysis on earlier today, and chronic abdominal pain that presented emergency department for left-sided flank pain.  Vitals are stable.  Stable on room air.  Exam with uncomfortable appearing female.  Writhing on the stretcher in pain.  No respiratory distress.  Well-perfused.  Differential includes but isn't limited to DKA, chronic abdominal pain, pancreatitis, and electrolyte abnormality.  Numerous CT scans done in the past so low suspicion for kidney stones.  Considered mesenteric ischemia in the past, but this would be chronic in nature.  Patient was given Ativan and Phenergan with resolution of symptoms.  Patient has an anion gap of 22 with hyperglycemia.  PH is relatively normal.  Ketones seen.  Concerning for DKA.  Started on insulin drip.  Admitted to the hospital.    Amount and/or Complexity of Data Reviewed  Labs: ordered.    Risk  Prescription drug management.  Decision regarding hospitalization.                                      Clinical Impression:  Final diagnoses:  [E13.10] Diabetic ketoacidosis without coma associated with other specified diabetes mellitus (Primary)          ED Disposition Condition    Admit Stable                Korey Morales MD  09/10/24 2028

## 2024-09-11 PROBLEM — E11.9 DIABETES: Status: ACTIVE | Noted: 2024-08-25

## 2024-09-11 PROBLEM — E11.10 DKA (DIABETIC KETOACIDOSIS): Status: RESOLVED | Noted: 2024-08-28 | Resolved: 2024-09-11

## 2024-09-11 LAB
ANION GAP SERPL CALC-SCNC: 15 MMOL/L (ref 8–16)
ANION GAP SERPL CALC-SCNC: 16 MMOL/L (ref 8–16)
BASOPHILS # BLD AUTO: 0.03 K/UL (ref 0–0.2)
BASOPHILS NFR BLD: 0.5 % (ref 0–1.9)
BUN SERPL-MCNC: 33 MG/DL (ref 6–20)
BUN SERPL-MCNC: 35 MG/DL (ref 6–20)
CALCIUM SERPL-MCNC: 8.6 MG/DL (ref 8.7–10.5)
CALCIUM SERPL-MCNC: 8.7 MG/DL (ref 8.7–10.5)
CHLORIDE SERPL-SCNC: 100 MMOL/L (ref 95–110)
CHLORIDE SERPL-SCNC: 98 MMOL/L (ref 95–110)
CO2 SERPL-SCNC: 24 MMOL/L (ref 23–29)
CO2 SERPL-SCNC: 25 MMOL/L (ref 23–29)
CREAT SERPL-MCNC: 8.7 MG/DL (ref 0.5–1.4)
CREAT SERPL-MCNC: 8.8 MG/DL (ref 0.5–1.4)
DIFFERENTIAL METHOD BLD: ABNORMAL
EOSINOPHIL # BLD AUTO: 0.1 K/UL (ref 0–0.5)
EOSINOPHIL NFR BLD: 1.9 % (ref 0–8)
ERYTHROCYTE [DISTWIDTH] IN BLOOD BY AUTOMATED COUNT: 18.1 % (ref 11.5–14.5)
EST. GFR  (NO RACE VARIABLE): 6 ML/MIN/1.73 M^2
EST. GFR  (NO RACE VARIABLE): 6 ML/MIN/1.73 M^2
GLUCOSE SERPL-MCNC: 173 MG/DL (ref 70–110)
GLUCOSE SERPL-MCNC: 80 MG/DL (ref 70–110)
HCT VFR BLD AUTO: 26.3 % (ref 37–48.5)
HGB BLD-MCNC: 8.7 G/DL (ref 12–16)
IMM GRANULOCYTES # BLD AUTO: 0.04 K/UL (ref 0–0.04)
IMM GRANULOCYTES NFR BLD AUTO: 0.6 % (ref 0–0.5)
LYMPHOCYTES # BLD AUTO: 1.1 K/UL (ref 1–4.8)
LYMPHOCYTES NFR BLD: 16.2 % (ref 18–48)
MAGNESIUM SERPL-MCNC: 2.2 MG/DL (ref 1.6–2.6)
MCH RBC QN AUTO: 28.8 PG (ref 27–31)
MCHC RBC AUTO-ENTMCNC: 33.1 G/DL (ref 32–36)
MCV RBC AUTO: 87 FL (ref 82–98)
MONOCYTES # BLD AUTO: 0.7 K/UL (ref 0.3–1)
MONOCYTES NFR BLD: 11.4 % (ref 4–15)
NEUTROPHILS # BLD AUTO: 4.5 K/UL (ref 1.8–7.7)
NEUTROPHILS NFR BLD: 69.4 % (ref 38–73)
NRBC BLD-RTO: 0 /100 WBC
PHOSPHATE SERPL-MCNC: 5.5 MG/DL (ref 2.7–4.5)
PLATELET # BLD AUTO: 200 K/UL (ref 150–450)
PMV BLD AUTO: 9.8 FL (ref 9.2–12.9)
POCT GLUCOSE: 113 MG/DL (ref 70–110)
POCT GLUCOSE: 150 MG/DL (ref 70–110)
POCT GLUCOSE: 154 MG/DL (ref 70–110)
POCT GLUCOSE: 165 MG/DL (ref 70–110)
POCT GLUCOSE: 165 MG/DL (ref 70–110)
POCT GLUCOSE: 169 MG/DL (ref 70–110)
POCT GLUCOSE: 178 MG/DL (ref 70–110)
POCT GLUCOSE: 183 MG/DL (ref 70–110)
POCT GLUCOSE: 195 MG/DL (ref 70–110)
POCT GLUCOSE: 50 MG/DL (ref 70–110)
POCT GLUCOSE: 77 MG/DL (ref 70–110)
POTASSIUM SERPL-SCNC: 3.8 MMOL/L (ref 3.5–5.1)
POTASSIUM SERPL-SCNC: 3.9 MMOL/L (ref 3.5–5.1)
RBC # BLD AUTO: 3.02 M/UL (ref 4–5.4)
SODIUM SERPL-SCNC: 138 MMOL/L (ref 136–145)
SODIUM SERPL-SCNC: 140 MMOL/L (ref 136–145)
WBC # BLD AUTO: 6.47 K/UL (ref 3.9–12.7)

## 2024-09-11 PROCEDURE — 25000003 PHARM REV CODE 250

## 2024-09-11 PROCEDURE — 25000003 PHARM REV CODE 250: Performed by: STUDENT IN AN ORGANIZED HEALTH CARE EDUCATION/TRAINING PROGRAM

## 2024-09-11 PROCEDURE — 25000003 PHARM REV CODE 250: Performed by: HOSPITALIST

## 2024-09-11 PROCEDURE — 11000001 HC ACUTE MED/SURG PRIVATE ROOM

## 2024-09-11 PROCEDURE — 63600175 PHARM REV CODE 636 W HCPCS: Performed by: HOSPITALIST

## 2024-09-11 PROCEDURE — 80048 BASIC METABOLIC PNL TOTAL CA: CPT | Mod: 91

## 2024-09-11 PROCEDURE — 83735 ASSAY OF MAGNESIUM: CPT

## 2024-09-11 PROCEDURE — 36415 COLL VENOUS BLD VENIPUNCTURE: CPT

## 2024-09-11 PROCEDURE — 80048 BASIC METABOLIC PNL TOTAL CA: CPT

## 2024-09-11 PROCEDURE — 80100014 HC HEMODIALYSIS 1:1

## 2024-09-11 PROCEDURE — 84100 ASSAY OF PHOSPHORUS: CPT

## 2024-09-11 PROCEDURE — G0378 HOSPITAL OBSERVATION PER HR: HCPCS

## 2024-09-11 PROCEDURE — 94761 N-INVAS EAR/PLS OXIMETRY MLT: CPT

## 2024-09-11 PROCEDURE — 63600175 PHARM REV CODE 636 W HCPCS

## 2024-09-11 PROCEDURE — 99900031 HC PATIENT EDUCATION (STAT)

## 2024-09-11 PROCEDURE — 99900035 HC TECH TIME PER 15 MIN (STAT)

## 2024-09-11 PROCEDURE — 85025 COMPLETE CBC W/AUTO DIFF WBC: CPT

## 2024-09-11 PROCEDURE — 63600175 PHARM REV CODE 636 W HCPCS: Performed by: NURSE PRACTITIONER

## 2024-09-11 PROCEDURE — 96372 THER/PROPH/DIAG INJ SC/IM: CPT | Performed by: HOSPITALIST

## 2024-09-11 PROCEDURE — 27000221 HC OXYGEN, UP TO 24 HOURS

## 2024-09-11 PROCEDURE — 5A1D70Z PERFORMANCE OF URINARY FILTRATION, INTERMITTENT, LESS THAN 6 HOURS PER DAY: ICD-10-PCS | Performed by: INTERNAL MEDICINE

## 2024-09-11 PROCEDURE — S5010 5% DEXTROSE AND 0.45% SALINE: HCPCS | Performed by: STUDENT IN AN ORGANIZED HEALTH CARE EDUCATION/TRAINING PROGRAM

## 2024-09-11 RX ORDER — LEVETIRACETAM 500 MG/1
500 TABLET ORAL 2 TIMES DAILY
Status: DISCONTINUED | OUTPATIENT
Start: 2024-09-11 | End: 2024-09-14 | Stop reason: HOSPADM

## 2024-09-11 RX ORDER — INSULIN ASPART 100 [IU]/ML
8 INJECTION, SOLUTION INTRAVENOUS; SUBCUTANEOUS
Status: DISCONTINUED | OUTPATIENT
Start: 2024-09-11 | End: 2024-09-12

## 2024-09-11 RX ORDER — DORZOLAMIDE HYDROCHLORIDE AND TIMOLOL MALEATE 20; 5 MG/ML; MG/ML
1 SOLUTION/ DROPS OPHTHALMIC EVERY 12 HOURS
Status: DISCONTINUED | OUTPATIENT
Start: 2024-09-11 | End: 2024-09-14 | Stop reason: HOSPADM

## 2024-09-11 RX ORDER — HYDROMORPHONE HYDROCHLORIDE 1 MG/ML
1 INJECTION, SOLUTION INTRAMUSCULAR; INTRAVENOUS; SUBCUTANEOUS EVERY 6 HOURS PRN
Status: COMPLETED | OUTPATIENT
Start: 2024-09-11 | End: 2024-09-12

## 2024-09-11 RX ORDER — IBUPROFEN 200 MG
16 TABLET ORAL
Status: DISCONTINUED | OUTPATIENT
Start: 2024-09-11 | End: 2024-09-14 | Stop reason: HOSPADM

## 2024-09-11 RX ORDER — INSULIN GLARGINE 100 [IU]/ML
15 INJECTION, SOLUTION SUBCUTANEOUS NIGHTLY
Status: DISCONTINUED | OUTPATIENT
Start: 2024-09-11 | End: 2024-09-12

## 2024-09-11 RX ORDER — IBUPROFEN 200 MG
24 TABLET ORAL
Status: DISCONTINUED | OUTPATIENT
Start: 2024-09-11 | End: 2024-09-14 | Stop reason: HOSPADM

## 2024-09-11 RX ORDER — SEVELAMER CARBONATE 800 MG/1
800 TABLET, FILM COATED ORAL
Status: DISCONTINUED | OUTPATIENT
Start: 2024-09-11 | End: 2024-09-14 | Stop reason: HOSPADM

## 2024-09-11 RX ORDER — BUSPIRONE HYDROCHLORIDE 10 MG/1
10 TABLET ORAL 3 TIMES DAILY PRN
Status: DISCONTINUED | OUTPATIENT
Start: 2024-09-11 | End: 2024-09-14 | Stop reason: HOSPADM

## 2024-09-11 RX ORDER — INSULIN ASPART 100 [IU]/ML
0-10 INJECTION, SOLUTION INTRAVENOUS; SUBCUTANEOUS
Status: DISCONTINUED | OUTPATIENT
Start: 2024-09-11 | End: 2024-09-14 | Stop reason: HOSPADM

## 2024-09-11 RX ORDER — FLUOXETINE HYDROCHLORIDE 20 MG/1
40 CAPSULE ORAL DAILY
Status: DISCONTINUED | OUTPATIENT
Start: 2024-09-11 | End: 2024-09-14 | Stop reason: HOSPADM

## 2024-09-11 RX ORDER — SUCRALFATE 1 G/1
1 TABLET ORAL 4 TIMES DAILY
Status: DISCONTINUED | OUTPATIENT
Start: 2024-09-11 | End: 2024-09-14 | Stop reason: HOSPADM

## 2024-09-11 RX ORDER — AMLODIPINE BESYLATE 5 MG/1
5 TABLET ORAL ONCE
Status: COMPLETED | OUTPATIENT
Start: 2024-09-12 | End: 2024-09-11

## 2024-09-11 RX ORDER — GLUCAGON 1 MG
1 KIT INJECTION
Status: DISCONTINUED | OUTPATIENT
Start: 2024-09-11 | End: 2024-09-14 | Stop reason: HOSPADM

## 2024-09-11 RX ORDER — TIMOLOL MALEATE 5 MG/ML
1 SOLUTION/ DROPS OPHTHALMIC 2 TIMES DAILY
Status: DISCONTINUED | OUTPATIENT
Start: 2024-09-11 | End: 2024-09-14 | Stop reason: HOSPADM

## 2024-09-11 RX ADMIN — BRIMONIDINE TARTRATE 1 DROP: 1.5 SOLUTION OPHTHALMIC at 08:09

## 2024-09-11 RX ADMIN — ONDANSETRON 4 MG: 2 INJECTION INTRAMUSCULAR; INTRAVENOUS at 11:09

## 2024-09-11 RX ADMIN — SUCRALFATE 1 G: 1 TABLET ORAL at 04:09

## 2024-09-11 RX ADMIN — INSULIN ASPART 8 UNITS: 100 INJECTION, SOLUTION INTRAVENOUS; SUBCUTANEOUS at 04:09

## 2024-09-11 RX ADMIN — TIMOLOL MALEATE 1 DROP: 5 SOLUTION/ DROPS OPHTHALMIC at 08:09

## 2024-09-11 RX ADMIN — Medication 16 G: at 08:09

## 2024-09-11 RX ADMIN — LEVETIRACETAM 500 MG: 250 TABLET, FILM COATED ORAL at 01:09

## 2024-09-11 RX ADMIN — AMLODIPINE BESYLATE 5 MG: 5 TABLET ORAL at 11:09

## 2024-09-11 RX ADMIN — DORZOLAMIDE HYDROCHLORIDE TIMOLOL MALEATE 1 DROP: 20; 5 SOLUTION/ DROPS OPHTHALMIC at 08:09

## 2024-09-11 RX ADMIN — HYDROMORPHONE HYDROCHLORIDE 1 MG: 1 INJECTION, SOLUTION INTRAMUSCULAR; INTRAVENOUS; SUBCUTANEOUS at 08:09

## 2024-09-11 RX ADMIN — SUCRALFATE 1 G: 1 TABLET ORAL at 01:09

## 2024-09-11 RX ADMIN — APIXABAN 5 MG: 2.5 TABLET, FILM COATED ORAL at 02:09

## 2024-09-11 RX ADMIN — DORZOLAMIDE HYDROCHLORIDE TIMOLOL MALEATE 1 DROP: 20; 5 SOLUTION/ DROPS OPHTHALMIC at 11:09

## 2024-09-11 RX ADMIN — BRIMONIDINE TARTRATE 1 DROP: 1.5 SOLUTION OPHTHALMIC at 09:09

## 2024-09-11 RX ADMIN — LORAZEPAM 1 MG: 2 INJECTION INTRAMUSCULAR; INTRAVENOUS at 07:09

## 2024-09-11 RX ADMIN — LEVETIRACETAM 500 MG: 250 TABLET, FILM COATED ORAL at 08:09

## 2024-09-11 RX ADMIN — FLUOXETINE HYDROCHLORIDE 40 MG: 20 CAPSULE ORAL at 01:09

## 2024-09-11 RX ADMIN — SEVELAMER CARBONATE 800 MG: 800 TABLET, FILM COATED ORAL at 04:09

## 2024-09-11 RX ADMIN — BRIMONIDINE TARTRATE 1 DROP: 1.5 SOLUTION OPHTHALMIC at 03:09

## 2024-09-11 RX ADMIN — INSULIN ASPART 2 UNITS: 100 INJECTION, SOLUTION INTRAVENOUS; SUBCUTANEOUS at 04:09

## 2024-09-11 RX ADMIN — SEVELAMER CARBONATE 800 MG: 800 TABLET, FILM COATED ORAL at 01:09

## 2024-09-11 RX ADMIN — APIXABAN 5 MG: 2.5 TABLET, FILM COATED ORAL at 08:09

## 2024-09-11 RX ADMIN — ATROPINE SULFATE 1 DROP: 10 SOLUTION OPHTHALMIC at 08:09

## 2024-09-11 RX ADMIN — SUCRALFATE 1 G: 1 TABLET ORAL at 08:09

## 2024-09-11 RX ADMIN — DEXTROSE AND SODIUM CHLORIDE: 5; 450 INJECTION, SOLUTION INTRAVENOUS at 04:09

## 2024-09-11 RX ADMIN — TIMOLOL MALEATE 1 DROP: 5 SOLUTION/ DROPS OPHTHALMIC at 02:09

## 2024-09-11 NOTE — ASSESSMENT & PLAN NOTE
Chronic, unclear etiology, most likely secondary to gastroparesis.   We will get abdominal pelvis without IV contrast

## 2024-09-11 NOTE — ASSESSMENT & PLAN NOTE
Patient has high anion gap metabolic acidosis upon admission, beta hydroxybutyrate negative, most likely secondary to ESRD, and also patient taking Diamox at home- unclear etiology.  Patient does not have DKA, no nausea vomiting diarrhea.   Patient has long history of gastroparesis, not candidate for Reglan- patient has significant QTC prolongation .   Start diabetic renal diet.  Resume insulin sliding scale  Resume Lantus 15 units q.h.s. plus 8 units premeal  Discontinue  insulin drip and fluids  Move to medical-surgical  Discontinue Diamox, unclear why she is on this medication

## 2024-09-11 NOTE — SUBJECTIVE & OBJECTIVE
Interval History:   Seen and examined at multidisciplinary rounds, patient's presentation does not support diagnosis of DKA, patient has metabolic acidosis most likely secondary to ESRD and Diamox.  Beta hydroxybutyrate negative.  Patient has a long history of gastroparesis.  Patient has chronic abdominal pain.  Patient received IV Ativan this morning and became lethargic.     Review of Systems   Gastrointestinal:  Positive for abdominal pain.     Objective:     Vital Signs (Most Recent):  Temp: 98.3 °F (36.8 °C) (09/11/24 0800)  Pulse: 83 (09/11/24 0830)  Resp: 16 (09/11/24 0830)  BP: (!) 179/65 (09/11/24 0800)  SpO2: 98 % (09/11/24 0830) Vital Signs (24h Range):  Temp:  [97.7 °F (36.5 °C)-99.3 °F (37.4 °C)] 98.3 °F (36.8 °C)  Pulse:  [] 83  Resp:  [12-33] 16  SpO2:  [76 %-100 %] 98 %  BP: (159-225)/() 179/65     Weight: 52.7 kg (116 lb 2.9 oz)  Body mass index is 21.25 kg/m².    Intake/Output Summary (Last 24 hours) at 9/11/2024 1129  Last data filed at 9/11/2024 0712  Gross per 24 hour   Intake 1889.02 ml   Output --   Net 1889.02 ml         Physical Exam  Constitutional:       Appearance: She is ill-appearing.   HENT:      Head: Normocephalic and atraumatic.      Nose: Nose normal.   Eyes:      Extraocular Movements: Extraocular movements intact.      Pupils: Pupils are equal, round, and reactive to light.   Cardiovascular:      Rate and Rhythm: Normal rate and regular rhythm.      Heart sounds: No murmur heard.  Pulmonary:      Effort: Pulmonary effort is normal.      Breath sounds: Normal breath sounds.   Abdominal:      General: Abdomen is flat.      Palpations: Abdomen is soft.      Tenderness: There is abdominal tenderness.   Musculoskeletal:         General: Normal range of motion.      Cervical back: Normal range of motion and neck supple.   Skin:     General: Skin is warm and dry.   Neurological:      General: No focal deficit present.      Mental Status: She is alert.      Comments:  Lethargic   Psychiatric:         Mood and Affect: Mood normal.             Significant Labs: All pertinent labs within the past 24 hours have been reviewed.  CBC:   Recent Labs   Lab 09/10/24  1255 09/10/24  1745 09/11/24  0420   WBC 8.57 10.24 6.47   HGB 10.3* 9.8* 8.7*   HCT 30.5* 28.2* 26.3*    245 200     CMP:   Recent Labs   Lab 09/10/24  1255 09/10/24  1745 09/10/24  2106 09/11/24  0031 09/11/24  0420    137 139 140 138   K 4.1 4.1 4.3 3.9 3.8   CL 93* 96 97 100 98   CO2 22* 19* 23 25 24   * 217* 213* 80 173*   BUN 30* 32* 33* 33* 35*   CREATININE 7.6* 8.5* 8.6* 8.7* 8.8*   CALCIUM 9.5 9.3 8.8 8.7 8.6*   PROT 7.5 7.4  --   --   --    ALBUMIN 4.1 3.4*  --   --   --    BILITOT 1.0 1.1*  --   --   --    ALKPHOS 99 99  --   --   --    AST 18 19  --   --   --    ALT 14 13  --   --   --    ANIONGAP 21* 22* 19* 15 16       Significant Imaging: I have reviewed all pertinent imaging results/findings within the past 24 hours.  I have reviewed and interpreted all pertinent imaging results/findings within the past 24 hours.    Scheduled Meds:   apixaban  5 mg Oral BID    atropine 1%  1 drop Left Eye BID    brimonidine 0.15 % OPTH DROP  1 drop Both Eyes TID    dorzolamide-timolol 2-0.5%  1 drop Left Eye Q12H    FLUoxetine  40 mg Oral Daily    insulin aspart U-100  8 Units Subcutaneous TID WM    insulin glargine U-100  15 Units Subcutaneous QHS    levETIRAcetam  500 mg Oral BID    pantoprazole  40 mg Intravenous Daily    sevelamer carbonate  800 mg Oral TID WM    sucralfate  1 g Oral QID    timolol maleate 0.5%  1 drop Left Eye BID     Continuous Infusions:   D5 and 0.45% NaCl   Intravenous Continuous PRN   Stopped at 09/11/24 0845     PRN Meds:.  Current Facility-Administered Medications:     acetaminophen, 650 mg, Oral, Q4H PRN    albuterol-ipratropium, 3 mL, Nebulization, Q6H PRN    busPIRone, 10 mg, Oral, TID PRN    dextrose 10%, 12.5 g, Intravenous, PRN    dextrose 10%, 25 g, Intravenous, PRN    D5  and 0.45% NaCl, , Intravenous, Continuous PRN    glucagon (human recombinant), 1 mg, Intramuscular, PRN    glucose, 16 g, Oral, PRN    glucose, 24 g, Oral, PRN    insulin aspart U-100, 0-10 Units, Subcutaneous, QID (AC + HS) PRN    ondansetron, 4 mg, Intravenous, Q6H PRN    promethazine (PHENERGAN) 12.5 mg in 0.9% NaCl 50 mL IVPB, 12.5 mg, Intravenous, Q6H PRN    sodium chloride 0.9%, 10 mL, Intravenous, PRN    sodium chloride 0.9%, 10 mL, Intravenous, PRN      CT Abdomen Pelvis  Without Contrast    Result Date: 9/11/2024  EXAMINATION: CT ABDOMEN PELVIS WITHOUT CONTRAST CLINICAL HISTORY: Abdominal pain, acute, nonlocalized; TECHNIQUE: Low dose axial images, sagittal and coronal reformations were obtained from the lung bases to the pubic symphysis.  Oral contrast was not administered. COMPARISON: Multiple priors, most recent 08/28/2024 FINDINGS: Mild cardiomegaly, unchanged.  Small pericardial effusion measuring simple fluid attenuation, decreased compared to prior.  Basilar airspace opacities improved. Please note in the absence of IV contra valuation for solid organ mass is limited. Liver is normal in morphology and with smooth contour.  Gallbladder surgically absent.  No intra or extrahepatic biliary ductal dilatation. Atrophic kidneys.  No hydronephrosis. The spleen adrenal glands and pancreas are normal. Gastric wall thickening and edema.  The abdominal aorta is normal in caliber with advanced calcification for patient's age including of the mesenteric and splenic arteries. No enlarged retroperitoneal lymph nodes. Normal appendix.  No bowel obstruction or inflammatory change.  No mid or free air. Circumferential bladder wall thickening.  Uterus and are unremarkable.  No enlarged pelvic lymph. Lower anterior abdominal subcutaneous edema similar prior, possibly from subcutaneous injections. No acute or aggressive osseous abnormality.     Findings concerning for infectious/inflammatory gastritis. Thick-walled  bladder as may be seen in the setting of cystitis.  Correlate with urinalysis. Improved pericardial effusion and basilar opacities. Electronically signed by: Tayler Squires Date:    09/11/2024 Time:    11:22    X-Ray Chest AP Portable    Result Date: 8/28/2024  EXAMINATION: XR CHEST AP PORTABLE CLINICAL HISTORY: End stage renal disease TECHNIQUE: Single frontal view of the chest was performed. COMPARISON: 08/22/2024. FINDINGS: Stable cardiomegaly. Heart and lungs  appear unchanged when allowing for differences in technique and positioning.     Stable cardiomegaly. No significant change from prior study. Electronically signed by: Sandeep Brower MD Date:    08/28/2024 Time:    21:42    CT Renal Stone Study ABD Pelvis WO    Result Date: 8/28/2024  EXAMINATION: CT RENAL STONE STUDY ABD PELVIS WO CLINICAL HISTORY: Flank pain, kidney stone suspected; TECHNIQUE: Low dose axial images, sagittal and coronal reformations were obtained from the lung bases to the pubic symphysis.  Contrast was not administered. COMPARISON: 08/20/2024. FINDINGS: Heart: Cardiomegaly with moderate pericardial effusion, similar compared to prior. Lung Bases: Increased vascular congestion at the lung bases with central perivascular edema.  No pleural effusions. Liver: Normal in size and attenuation, with no focal hepatic lesions. Gallbladder: Surgically absent. Bile Ducts: No evidence of dilated ducts. Pancreas: Unchanged from prior. Spleen: Unchanged from prior. Adrenals: Unremarkable. Kidneys/ Ureters: Bilateral renal atrophy.  Extensive renal vascular calcifications.  No renal calculi or hydronephrosis. Bladder: Diffuse bladder wall thickening, similar to prior. Reproductive organs: Right adnexal enlargement, nonspecific, similar compared to recent prior.  Extensive vascular calcifications in the adnexa. GI Tract/Mesentery: No evidence of bowel obstruction.  Bowel loops appears similar to prior. Peritoneal Space: No ascites. No free air.  Retroperitoneum: Enlarged left periaortic lymph nodes, unchanged from recent prior. Abdominal wall: Anasarca, similar to prior.  Postsurgical changes abdominal wall, similar to prior. Vasculature: Extensive vascular calcifications present, similar compared to prior study.  No abdominal aortic aneurysm. Bones: No acute fracture.     Cardiomegaly with moderate pericardial effusion, similar to prior.  Interval development of vascular congestion and perivascular edema at the lung bases. Anasarca, similar to prior. Bilateral renal atrophy.  No renal/ureteral calculi or hydroureteronephrosis. Diffuse bladder wall thickening, nonspecific, unchanged from prior. Left periaortic lymph node enlargement, unchanged from recent prior. Right adnexal enlargement, nonspecific, unchanged from recent prior. Extensive vascular calcifications, unchanged from recent prior. Additional findings as above. Electronically signed by: Sandeep Brower MD Date:    08/28/2024 Time:    21:12    CT Abdomen Pelvis  Without Contrast    Result Date: 8/22/2024  EXAMINATION: CT ABDOMEN PELVIS WITHOUT CONTRAST CLINICAL HISTORY: Abdominal pain, acute, nonlocalized; TECHNIQUE: Low dose axial images, sagittal and coronal reformations were obtained from the lung bases to the pubic symphysis without IV contrast.  Oral contrast was not administered. COMPARISON: CT 08/20/2024 FINDINGS: There is mild mosaic alteration of the visualized lung parenchyma.  There are scattered bandlike opacities suggesting atelectasis or scarring.  No significant pleural fluid.  Heart is enlarged.  There is a moderate to large pericardial effusion. Liver is mildly enlarged.  No focal hepatic abnormality on this limited noncontrast study.  The gallbladder is surgically absent.  There is no intra-or extrahepatic biliary ductal dilatation. Stomach appears within normal limits.  Pancreas and adrenal glands are unremarkable for noncontrast technique.  Spleen is mildly enlarged, similar to  prior examination. Bilateral native kidneys are atrophic with prominent vascular calcifications.  There is no significant hydronephrosis.  Urinary bladder demonstrates nonspecific circumferential wall thickening.  Noncontrast appearance of the uterus is unremarkable.  Stable appearance of the bilateral adnexa.  No significant free fluid in the pelvis. The abdominal aorta is normal in course and caliber extensive calcification of the abdominal aorta and visceral branch vessels.  There is no retroperitoneal hematoma. There are mildly prominent fluid-filled loops of small bowel throughout the abdomen and pelvis without discrete transition point which may reflect a nonspecific enteritis.  There is a moderate volume of retained stool throughout the colon suggesting constipation.  No CT evidence of acute appendicitis.  There is no ascites, portal venous gas, or free intraperitoneal air.  There is a small fat containing umbilical hernia.  There are scattered shotty mesenteric lymph nodes.  There are mildly prominent retroperitoneal lymph nodes, similar to prior study. Osseous structures demonstrate no acute fracture.  There is mild diffuse body wall edema.  There is a somewhat more focal region of soft tissue induration within the left paramidline lower abdominal wall possibly a prior injection site or hematoma.  Clinical correlation with physical examination advised.     Atrophic bilateral native kidneys.  No evidence of hydronephrosis. Mild nonspecific urinary bladder wall thickening.  Correlation with urinalysis advised. Moderate to large volume pericardial effusion.  Cardiomegaly. Mildly prominent fluid-filled loops of small bowel without discrete transition point which may reflect a nonspecific enteritis. Mild hepatosplenomegaly. Retained stool throughout the colon suggesting constipation. Additional findings as above. Electronically signed by: Kaushik Diego MD Date:    08/22/2024 Time:    20:35    X-Ray Chest AP  Portable    Result Date: 8/22/2024  EXAMINATION: XR CHEST AP PORTABLE CLINICAL HISTORY: Dorsalgia, unspecified TECHNIQUE: Portable view of the chest was performed. COMPARISON: 08/20/2024. FINDINGS: Lungs are clear.  No focal consolidation.  Heart size is enlarged.  Mediastinal contours unremarkable.  Trachea midline. Bony thorax intact.     Cardiomegaly. Electronically signed by: Varun Trejo Date:    08/22/2024 Time:    06:34    CT Renal Stone Study ABD Pelvis WO    Result Date: 8/20/2024  EXAMINATION: CT RENAL STONE STUDY ABD PELVIS WO CLINICAL HISTORY: Flank pain, kidney stone suspected;left; TECHNIQUE: Low dose axial images, sagittal and coronal reformations were obtained from the lung bases to the pubic symphysis.  Contrast was not administered. COMPARISON: July 16, 2024 FINDINGS: Mild bibasal atelectasis is evident.  There is moderate-sized pericardial effusion.  There is evidence of cardiomegaly and anasarca.  Extensive vascular calcifications are present.  There is evidence of prior cholecystectomy.  There is evidence of renal atrophy bilaterally.  There is an enlarged left periaortic lymph node measuring 13 mm in small stone mentions.  This is nonspecific but is similar to the prior examination.  There is a right adnexal mass which is chronic and may represent a ovarian mass or uterine fibroid.  This area measures 5 x 3.7 cm and is unchanged relative to 2023 a few sigmoid diverticula are seen.  I do not see CT evidence of diverticulitis.  There is no evidence of nephrolithiasis, hydronephrosis or hydroureter.  There is no evidence of bowel obstruction.     No evidence of nephrolithiasis, hydronephrosis or hydroureter. Bladder wall thickening which is nonspecific and evidence of bilateral renal atrophy. Cardiomegaly anasarca and evidence of prior cholecystectomy are again noted. Stable left periaortic enlarged lymph node. Limited evaluation of the colon and parenchyma. Right adnexal mass which is chronic  and could represent a uterine fibroid but was not confidently seen on prior ultrasound. Bibasal atelectasis Electronically signed by: Thanh Davila MD Date:    08/20/2024 Time:    09:03    X-Ray Chest AP Portable    Result Date: 8/20/2024  EXAMINATION: XR CHEST AP PORTABLE CLINICAL HISTORY: Cough, unspecified TECHNIQUE: Portable view of the chest was performed. COMPARISON: 07/18/2024. FINDINGS: Lungs are clear.  No focal consolidation.  Heart size is enlarged.  Mediastinal contours unremarkable.  Trachea midline. Bony thorax intact.     Cardiomegaly. Electronically signed by: Varun Trejo Date:    08/20/2024 Time:    08:46  - pulls last radiology orders

## 2024-09-11 NOTE — ASSESSMENT & PLAN NOTE
Chronic, uncontrolled. Latest blood pressure and vitals reviewed-     Temp:  [97.9 °F (36.6 °C)-99.3 °F (37.4 °C)]   Pulse:  []   Resp:  [19-22]   BP: (162-224)/()   SpO2:  [97 %-100 %] .   Home meds for hypertension were reviewed and noted below.   Hypertension Medications               hydrALAZINE (APRESOLINE) 50 MG tablet Take 1 tablet (50 mg total) by mouth every 8 (eight) hours.            While in the hospital, will manage blood pressure as follows; Adjust home antihypertensive regimen as follows- Continue home medications and use PRN IV hydralazine     Will utilize p.r.n. blood pressure medication only if patient's blood pressure greater than 180/110 and she develops symptoms such as worsening chest pain or shortness of breath.

## 2024-09-11 NOTE — ASSESSMENT & PLAN NOTE
Patient is identified as having Diastolic (HFpEF) heart failure that is Chronic. CHF is currently controlled. Latest ECHO performed and demonstrates- Results for orders placed during the hospital encounter of 07/16/24    Echo    Interpretation Summary    Left Ventricle: The left ventricle is normal in size. Normal wall thickness. There is normal systolic function with a visually estimated ejection fraction of 65 - 70%. There is normal diastolic function.    Right Ventricle: Normal right ventricular cavity size. Wall thickness is normal. Systolic function is normal.    Pericardium: There is a small effusion. No indication of cardiac tamponade. Evidence includes no chamber collapse, no respiratory chamber variation.  Will continue home hydralazine and monitor clinical status closely. Monitor on telemetry. Patient is off CHF pathway.  Monitor strict Is&Os and daily weights.  Place on fluid restriction of 1.5 L. Cardiology has not been consulted. Continue to stress to patient importance of self efficacy and  on diet for CHF.

## 2024-09-11 NOTE — H&P
"  Former[facility]  History & Physical    Patient Name: Tabby Howard  MRN: [id number]  Patient Class: OP- Observation  Admission Date: 9/10/2024  Attending Physician: Oneyda Hinds MD   Primary Care Provider: Melony Kim NP         Patient information was obtained from patient, past medical records, and ER records.     Subjective:     Principal Problem:DKA (diabetic ketoacidosis)    Chief Complaint:   Chief Complaint   Patient presents with    Flank Pain     Bilateral flank pain, was seen at [facility] and discharged within the hour, states "I got tylenol but I need dilaudid or morphine, I can't handle the pain"         HPI: Tabby Howard is a 34 year old female with a previous medical history of ESRD on dialysis Tue/Thur/Sat, chronic abdominal pain, HTN, Type II Diabetes, who presented to the ED  for left-sided flank pain. HPI is limited to chart review and ED note due to patient somnolence.  Per chart review, patient has multiple similar episodes in the past and has had multiple previous CT scans performed.  Also has had multiple hospitalizations for DKA in the past.  Patient had dialysis earlier today, and following this, she had bilateral flank pain.  Left was worse than right.  She states that she has been taking her long-acting insulin.  She initially presented to [facility].  She had some lab work performed there which did show an anion gap with hyperglycemia that was elevated.  At the outside facility, she repeatedly requested opioid analgesia which was not indicated at that time.  Patient then stated that she would leave and go to another hospital.  [facility] and came to [facility].  Upon arrival, complaining of bilateral with left-greater-than-right flank pain.  States that she has been using her insulin although she has a long history of insulin noncompliance. Patient administered 2mg of ativan and 12.5 of IV phenergan in ED with alleviation of her " pain symptoms. She was initiated on an insulin drip for DKA and admitted by hospital medicine for further evaluation and management.     Past Medical History:   Diagnosis Date    ESRD on hemodialysis 2022    Gastritis 2022    EGD was 22    Gastroparesis 2022    has not had Emptying study    Heart failure with preserved ejection fraction 2022    EF 55% on 3/22    History of supraventricular tachycardia     Hyperkalemia 2022    Hypertensive emergency 2022    Sickle cell trait 2022    Type 2 diabetes mellitus        Past Surgical History:   Procedure Laterality Date     SECTION      x 3    COLONOSCOPY      COLONOSCOPY N/A 2022    Procedure: COLONOSCOPY;  Surgeon: Jagdeep Cedeno MD;  Location: [facility];  Service: Endoscopy;  Laterality: N/A;    DESTRUCTION, CILIARY BODY, USING LASER Left 2024    Procedure: Cyclophotocoagulation G-probe, retrobulbar chlorpromazine left eye;  Surgeon: Fidel Wise MD;  Location: [address];  Service: Ophthalmology;  Laterality: Left;    ENDOSCOPIC ULTRASOUND OF UPPER GASTROINTESTINAL TRACT N/A 2023    Procedure: ULTRASOUND, UPPER GI TRACT, ENDOSCOPIC;  Surgeon: Micky Paredes III, MD;  Location: [facility];  Service: Endoscopy;  Laterality: N/A;    ESOPHAGOGASTRODUODENOSCOPY N/A 10/18/2019    Procedure: ESOPHAGOGASTRODUODENOSCOPY (EGD);  Surgeon: Gianluca Mendez MD;  Location: [facility];  Service: Endoscopy;  Laterality: N/A;    ESOPHAGOGASTRODUODENOSCOPY N/A 2022    Procedure: EGD (ESOPHAGOGASTRODUODENOSCOPY);  Surgeon: Micky Paredes III, MD;  Location: [facility];  Service: Endoscopy;  Laterality: N/A;    ESOPHAGOGASTRODUODENOSCOPY N/A 2022    Procedure: EGD (ESOPHAGOGASTRODUODENOSCOPY);  Surgeon: Marcelo Zhong MD;  Location: [facility];  Service: Endoscopy;  Laterality: N/A;    EYE SURGERY      INSERTION, CATHETER, TUNNELED N/A 2023    Procedure: Insertion,catheter,tunneled;   "Surgeon: Carlos Thurman Jr., MD;  Location: [facility] OR;  Service: General;  Laterality: N/A;    LAPAROSCOPIC CHOLECYSTECTOMY N/A 07/30/2022    Procedure: CHOLECYSTECTOMY, LAPAROSCOPIC;  Surgeon: Grey Perez MD;  Location: [facility] OR;  Service: General;  Laterality: N/A;    PLACEMENT OF DUAL-LUMEN VASCULAR CATHETER Left 07/12/2022    Procedure: INSERTION-CATHETER-JOSEPH;  Surgeon: Dionte Gan MD;  Location: [facility] OR;  Service: General;  Laterality: Left;    PLACEMENT OF DUAL-LUMEN VASCULAR CATHETER Right 07/26/2022    Procedure: INSERTION-CATHETER-Hemosplit;  Surgeon: Dionte Gan MD;  Location: [facility] OR;  Service: General;  Laterality: Right;       Review of patient's allergies indicates:   Allergen Reactions    Droperidol Hives    Penicillins Hives    Droperidol (bulk) Anxiety     STATES "FREAKS OUT".  TOLERATES HALDOL PRIOR TO ARRIVAL AT ANOTHER FACILITY TODAY.        Current Facility-Administered Medications on File Prior to Encounter   Medication    [COMPLETED] acetaminophen tablet 650 mg    [COMPLETED] hydrALAZINE tablet 50 mg     Current Outpatient Medications on File Prior to Encounter   Medication Sig    acetaZOLAMIDE (DIAMOX) 250 MG tablet take 1 tablet by mouth 3 times a day    albuterol (VENTOLIN HFA) 90 mcg/actuation inhaler Inhale 2 puffs every 4 hours by inhalation route.    apixaban (ELIQUIS) 5 mg Tab Take 1 tablet (5 mg total) by mouth 2 (two) times daily. Patient w/ thrombocytopenia <50, so held during admission, follow-up with hematologist about restarting    atropine 1% (ISOPTO ATROPINE) 1 % Drop Place 1 drop into the left eye 2 (two) times a day.    blood sugar diagnostic (TRUE METRIX GLUCOSE TEST STRIP) Strp Use 1 strip to check blood glucose three times daily    blood-glucose meter (TRUE METRIX GLUCOSE METER) Misc Use to check blood glucose    blood-glucose meter,continuous Misc USE WITH SENSORS    blood-glucose sensor (DEXCOM G7 SENSOR) Heather Use as directed for CGM. Change sensor " every 10 days    blood-glucose sensor Heather CHANGE EVERY 10 DAYS    brimonidine 0.15 % OPTH DROP (ALPHAGAN) 0.15 % ophthalmic solution Place 1 drop into both eyes 3 (three) times daily.    brinzolamide (AZOPT) 1 % ophthalmic suspension Place1 drop in left eye 3 times a day for 30 days    busPIRone (BUSPAR) 10 MG tablet Take 1 tablet (10 mg total) by mouth 3 (three) times daily as needed (anxiety).    cetirizine (ZYRTEC) 10 MG tablet Take 1 tablet every day by oral route.    dorzolamide-timolol 2-0.5% (COSOPT) 22.3-6.8 mg/mL ophthalmic solution place 1 drop in left eye twice a day  for 30 days    FLUoxetine 40 MG capsule Take 1 capsule every day by oral route.    fluticasone propionate (FLONASE ALLERGY RELIEF) 50 mcg/actuation nasal spray [place] 1 spray every day by intranasal route.    gabapentin (NEURONTIN) 300 MG capsule Take 2 capsules twice a day by oral route for 90 days.    hydrALAZINE (APRESOLINE) 50 MG tablet Take 1 tablet (50 mg total) by mouth every 8 (eight) hours.    insulin aspart U-100 (NOVOLOG) 100 unit/mL (3 mL) InPn pen Inject 8 Units into the skin 3 (three) times daily with meals.    insulin glargine U-100, Lantus, (LANTUS SOLOSTAR U-100 INSULIN) 100 unit/mL (3 mL) InPn pen Inject 22 units every day by subcutaneous route in the evening for 30 days, for diabetes.    lancets (TRUEPLUS LANCETS) 33 gauge Misc Use 1 to check blood glucose three times daily    levETIRAcetam (KEPPRA) 500 MG Tab Take 1 tablet (500 mg total) by mouth 2 (two) times daily.    LORazepam (ATIVAN) 0.5 MG tablet Take 1 tablet 3 times a day by oral route for 7 days, for severe panic.    ondansetron (ZOFRAN-ODT) 4 MG TbDL Place 1 tablet (4 mg) on or under the tongue every 8 hours for 10 days.    oxyCODONE-acetaminophen (PERCOCET)  mg per tablet Take 1 tablet by mouth every 4 (four) hours as needed for Pain.    pantoprazole (PROTONIX) 40 MG tablet Take 1 tablet (40 mg total) by mouth once daily.    pen needle, diabetic 31 gauge  "x 3/16" Ndle Use as directed with insulin once daily    prednisoLONE acetate (PRED FORTE) 1 % DrpS Place 1 drop into the left eye 2 (two) times daily.    promethazine (PHENERGAN) 25 MG tablet Take 1 tablet (25 mg total) by mouth every 6 (six) hours as needed.    sevelamer carbonate (RENVELA) 800 mg Tab Take 1 tablet (800 mg total) by mouth 3 (three) times daily with meals.    sucralfate (CARAFATE) 1 gram tablet Take 1 tablet (1 g total) by mouth 4 (four) times daily.    timolol maleate 0.5% (TIMOPTIC) 0.5 % Drop Place 1 drop in left eye 2 times a day for 30 days    [DISCONTINUED] atenoloL (TENORMIN) 50 MG tablet Take 1 tablet (50 mg total) by mouth every other day.    [DISCONTINUED] insulin detemir U-100, Levemir, (LEVEMIR FLEXTOUCH U100 INSULIN) 100 unit/mL (3 mL) InPn pen Inject 22 units every day by subcutaneous route in the evening for 90 days.    [DISCONTINUED] omeprazole (PRILOSEC) 20 MG capsule Take 2 capsules (40 mg total) by mouth once daily. for 10 days     Family History       Problem Relation (Age of Onset)    Diabetes Mother, Father          Tobacco Use    Smoking status: Never    Smokeless tobacco: Never   Substance and Sexual Activity    Alcohol use: Not Currently    Drug use: No    Sexual activity: Not Currently     Partners: Male     Birth control/protection: I.U.D.     Review of Systems   Unable to perform ROS: Mental status change (Patient not receptive to questions)     Objective:     Vital Signs (Most Recent):  Temp: 98.1 °F (36.7 °C) (09/10/24 1607)  Pulse: 100 (09/10/24 1607)  Resp: 19 (09/10/24 1607)  BP: (!) 189/90 (09/10/24 1607)  SpO2: 100 % (09/10/24 1814) Vital Signs (24h Range):  Temp:  [97.9 °F (36.6 °C)-99.3 °F (37.4 °C)] 98.1 °F (36.7 °C)  Pulse:  [] 100  Resp:  [19-22] 19  SpO2:  [97 %-100 %] 100 %  BP: (162-224)/() 189/90     Weight: 53.1 kg (117 lb)  Body mass index is 21.4 kg/m².     Physical Exam  Vitals reviewed.   Constitutional:       General: She is not in acute " distress.  HENT:      Head: Normocephalic and atraumatic.      Nose: Nose normal.      Mouth/Throat:      Mouth: Mucous membranes are dry.      Pharynx: Oropharynx is clear.   Eyes:      Pupils: Pupils are equal, round, and reactive to light.   Cardiovascular:      Rate and Rhythm: Normal rate and regular rhythm.      Pulses: Normal pulses.      Heart sounds: Normal heart sounds.      Comments: Left arm AV access palpable pulse   Pulmonary:      Effort: Pulmonary effort is normal.      Breath sounds: Normal breath sounds.   Abdominal:      General: Bowel sounds are normal. There is no distension.      Palpations: Abdomen is soft.   Musculoskeletal:         General: Normal range of motion.      Cervical back: Normal range of motion and neck supple.   Skin:     General: Skin is warm and dry.      Capillary Refill: Capillary refill takes less than 2 seconds.   Neurological:      GCS: GCS eye subscore is 3. GCS verbal subscore is 5. GCS motor subscore is 6.      Comments: Somnolent but wakes to voice and answers questions and follows commands.               CRANIAL NERVES     CN III, IV, VI   Pupils are equal, round, and reactive to light.       Significant Labs: All pertinent labs within the past 24 hours have been reviewed.  A1C:   Recent Labs   Lab 06/23/24  0712 09/10/24  2106   HGBA1C 8.5* 9.0*     ABGs:   Recent Labs   Lab 09/10/24  1814   PH 7.357   PCO2 43.9   HCO3 24.6   POCSATURATED 93   BE -1   PO2 70     Bilirubin:   Recent Labs   Lab 08/31/24  0801 09/08/24  0331 09/08/24  1235 09/10/24  1255 09/10/24  1745   BILITOT 0.5 0.9 0.6 1.0 1.1*     BMP:   Recent Labs   Lab 09/10/24  2106   *      K 4.3   CL 97   CO2 23   BUN 33*   CREATININE 8.6*   CALCIUM 8.8     CBC:   Recent Labs   Lab 09/10/24  1255 09/10/24  1745   WBC 8.57 10.24   HGB 10.3* 9.8*   HCT 30.5* 28.2*    245     CMP:   Recent Labs   Lab 09/10/24  1255 09/10/24  1745 09/10/24  2106    137 139   K 4.1 4.1 4.3   CL 93* 96  97   CO2 22* 19* 23   * 217* 213*   BUN 30* 32* 33*   CREATININE 7.6* 8.5* 8.6*   CALCIUM 9.5 9.3 8.8   PROT 7.5 7.4  --    ALBUMIN 4.1 3.4*  --    BILITOT 1.0 1.1*  --    ALKPHOS 99 99  --    AST 18 19  --    ALT 14 13  --    ANIONGAP 21* 22* 19*       Lipase:   Recent Labs   Lab 09/10/24  1255 09/10/24  1745   LIPASE 23 21       TSH:   Recent Labs   Lab 07/16/24  1610   TSH 1.355       Significant Imaging: I have reviewed all pertinent imaging results/findings most recently performed on 8/28/24  EXAMINATION:  XR CHEST AP PORTABLE     CLINICAL HISTORY:  End stage renal disease     TECHNIQUE:  Single frontal view of the chest was performed.     COMPARISON:  08/22/2024.     FINDINGS:  Stable cardiomegaly.     Heart and lungs  appear unchanged when allowing for differences in technique and positioning.     Impression:     Stable cardiomegaly.     No significant change from prior study.        Electronically signed by:Sandeep Brower MD  Date:                                            08/28/2024  Time:                                           21:42    EXAMINATION:  CT RENAL STONE STUDY ABD PELVIS WO     CLINICAL HISTORY:  Flank pain, kidney stone suspected;     TECHNIQUE:  Low dose axial images, sagittal and coronal reformations were obtained from the lung bases to the pubic symphysis.  Contrast was not administered.     COMPARISON:  08/20/2024.     FINDINGS:  Heart: Cardiomegaly with moderate pericardial effusion, similar compared to prior.     Lung Bases: Increased vascular congestion at the lung bases with central perivascular edema.  No pleural effusions.     Liver: Normal in size and attenuation, with no focal hepatic lesions.     Gallbladder: Surgically absent.     Bile Ducts: No evidence of dilated ducts.     Pancreas: Unchanged from prior.     Spleen: Unchanged from prior.     Adrenals: Unremarkable.     Kidneys/ Ureters: Bilateral renal atrophy.  Extensive renal vascular calcifications.  No renal  calculi or hydronephrosis.     Bladder: Diffuse bladder wall thickening, similar to prior.     Reproductive organs: Right adnexal enlargement, nonspecific, similar compared to recent prior.  Extensive vascular calcifications in the adnexa.     GI Tract/Mesentery: No evidence of bowel obstruction.  Bowel loops appears similar to prior.     Peritoneal Space: No ascites. No free air.     Retroperitoneum: Enlarged left periaortic lymph nodes, unchanged from recent prior.     Abdominal wall: Anasarca, similar to prior.  Postsurgical changes abdominal wall, similar to prior.     Vasculature: Extensive vascular calcifications present, similar compared to prior study.  No abdominal aortic aneurysm.     Bones: No acute fracture.     Impression:     Cardiomegaly with moderate pericardial effusion, similar to prior.  Interval development of vascular congestion and perivascular edema at the lung bases.     Anasarca, similar to prior.     Bilateral renal atrophy.  No renal/ureteral calculi or hydroureteronephrosis.     Diffuse bladder wall thickening, nonspecific, unchanged from prior.     Left periaortic lymph node enlargement, unchanged from recent prior.     Right adnexal enlargement, nonspecific, unchanged from recent prior.     Extensive vascular calcifications, unchanged from recent prior.     Additional findings as above.        Electronically signed by:Sandeep Brower MD  Date:                                            08/28/2024  Time:                                           21:12    Assessment/Plan:     * DKA (diabetic ketoacidosis)  Patient's FSGs are uncontrolled due to hyperglycemia on current medication regimen.  Last A1c reviewed-   Lab Results   Component Value Date    HGBA1C 8.5 (H) 06/23/2024     Most recent fingerstick glucose reviewed-   Recent Labs   Lab 09/10/24  1934 09/10/24  2013   POCTGLUCOSE 205* 208*     Current correctional scale   Insulin Infusion  Increase anti-hyperglycemic dose as follows-    Antihyperglycemics (From admission, onward)      Start     Stop Route Frequency Ordered    09/10/24 1915  insulin regular 1 Units/mL in 0.9% NaCl 100 mL infusion        Question Answer Comment   Insulin Rate Adjustment (DO NOT MODIFY ANSWER) [email or web address]\[email or web address]\INSULIN ADJUSTMENT DKA version [email or web address]    Initial dose (DO NOT CHANGE): 0.1 units/kg/hr        -- IV Continuous 09/10/24 1908          Hold Oral hypoglycemics while patient is in the hospital.  NPO    ESRD (end stage renal disease) on dialysis  Last Dialysis on 9/10/24.    -IP consult to nephrology for dialysis orders    Hypertension  Chronic, uncontrolled. Latest blood pressure and vitals reviewed-     Temp:  [97.9 °F (36.6 °C)-99.3 °F (37.4 °C)]   Pulse:  []   Resp:  [19-22]   BP: (162-224)/()   SpO2:  [97 %-100 %] .   Home meds for hypertension were reviewed and noted below.   Hypertension Medications               hydrALAZINE (APRESOLINE) 50 MG tablet Take 1 tablet (50 mg total) by mouth every 8 (eight) hours.            While in the hospital, will manage blood pressure as follows; Adjust home antihypertensive regimen as follows- Continue home medications and use PRN IV hydralazine     Will utilize p.r.n. blood pressure medication only if patient's blood pressure greater than 180/110 and she develops symptoms such as worsening chest pain or shortness of breath.    Anemia in ESRD (end-stage renal disease)  Anemia is likely due to chronic disease due to ESRD. Most recent hemoglobin and hematocrit are listed below.  Recent Labs     09/08/24  1235 09/10/24  1255 09/10/24  1745   HGB 9.2* 10.3* 9.8*   HCT 27.7* 30.5* 28.2*     Plan  - Monitor serial CBC: Daily  - Transfuse PRBC if patient becomes hemodynamically unstable, symptomatic or H/H drops below 7/21.  - Patient has not received any PRBC transfusions to date  - Patient's anemia is currently stable      Chronic congestive heart failure with left  ventricular diastolic dysfunction  Patient is identified as having Diastolic (HFpEF) heart failure that is Chronic. CHF is currently controlled. Latest ECHO performed and demonstrates- Results for orders placed during the hospital encounter of 07/16/24    Echo    Interpretation Summary    Left Ventricle: The left ventricle is normal in size. Normal wall thickness. There is normal systolic function with a visually estimated ejection fraction of 65 - 70%. There is normal diastolic function.    Right Ventricle: Normal right ventricular cavity size. Wall thickness is normal. Systolic function is normal.    Pericardium: There is a small effusion. No indication of cardiac tamponade. Evidence includes no chamber collapse, no respiratory chamber variation.  Will continue home hydralazine and monitor clinical status closely. Monitor on telemetry. Patient is off CHF pathway.  Monitor strict Is&Os and daily weights.  Place on fluid restriction of 1.5 L. Cardiology has not been consulted. Continue to stress to patient importance of self efficacy and  on diet for CHF.         Gastroparesis - suspected, unconfirmed  -IV ativan 1mg Q 4 hours  -IV phenergan 12mg Q 6 hours PRN nausea          VTE Risk Mitigation (From admission, onward)           Ordered     apixaban tablet 5 mg  2 times daily         09/10/24 2205     IP VTE HIGH RISK PATIENT  Once         09/10/24 2050     Place JOCELYNE hose  Until discontinued         09/10/24 2050     Place sequential compression device  Until discontinued         09/10/24 2050     Reason for No Pharmacological VTE Prophylaxis  Once        Question:  Reasons:  Answer:  Already adequately anticoagulated on oral Anticoagulants    09/10/24 2050     Place sequential compression device  Until discontinued         09/10/24 1908                         On 09/10/2024, patient should be placed in hospital observation services under my care in collaboration with Dr. Oneyda Hinds MD.           Crissy SWAIN  CARO Michaels  Department of Hospital Medicine  [facility]/Surg

## 2024-09-11 NOTE — CARE UPDATE
09/11/24 0735   Patient Assessment/Suction   Level of Consciousness (AVPU) alert   Respiratory Effort Normal;Unlabored   Expansion/Accessory Muscles/Retractions no use of accessory muscles;no retractions;expansion symmetric   All Lung Fields Breath Sounds Anterior:;Lateral:;diminished   Rhythm/Pattern, Respiratory unlabored;pattern regular;depth regular   Cough Frequency no cough   PRE-TX-O2   Device (Oxygen Therapy) room air   SpO2 97 %   Pulse Oximetry Type Continuous   $ Pulse Oximetry - Multiple Charge Pulse Oximetry - Multiple   Pulse 83   Resp 14   Positioning HOB elevated 30 degrees   Aerosol Therapy   $ Aerosol Therapy Charges PRN treatment not required   Education   $ Education Bronchodilator;15 min

## 2024-09-11 NOTE — ED NOTES
Spoke with pharmacy and ERP regarding insulin infusion and fluid administrations.  Will start infusion at 0.05unites/kg/hr and with the d5-1/2ns at 125/hr   This document is complete and the patient is ready for discharge.

## 2024-09-11 NOTE — NURSING
Tong Henao  and myself rounding on Ms Mcnair plan of care discussed. Pt to have Ct of abdomen for pain. Patient can come out of stepdown.

## 2024-09-11 NOTE — PROGRESS NOTES
09/11/24 1350   Required for all Hemodialysis Patients   Hepatitis Status negative   Handoff Report   Received From SHAILA Caraballo, Dialysis   Given To SHAILA Jenkins   Treatment Type   Treatment Type Maintenance   Vital Signs   Temp 98.4 °F (36.9 °C)   Temp Source Oral   Pulse 91   Heart Rate Source Monitor   Resp (!) 22   SpO2 100 %   BP (!) 184/84   MAP (mmHg) 120        Hemodialysis AV Fistula Left upper arm   No placement date or time found.   Location: Left upper arm   Needle Size 15ga   Site Assessment Clean;Dry;Intact   Patency Present;Thrill;Bruit   Status Deaccessed   Flows Good   Dressing Intervention First dressing   Dressing Status Clean;Dry;Intact   Dressing Gauze   Post-Hemodialysis Assessment   Rinseback Volume (mL) 250 mL   Blood Volume Processed (Liters) 40 L   Dialyzer Clearance Clear   Duration of Treatment 120 minutes   Additional Fluid Intake (mL) 500 mL   Total UF (mL) 2500 mL   Net Fluid Removal 2000   Patient Response to Treatment tolerated w/o diff.   Post-Treatment Weight 50.5 kg (111 lb 5.3 oz)   Treatment Weight Change -2.2   Arterial bleeding stop time (min) 5 min   Venous bleeding stop time (min) 7 min   Post-Hemodialysis Comments Report provided to SHAILA Jenkins post hd treatment.  treatment tolerated w/o diff.     Patient educated regarding treatment compliance.  Verbalized understanding.

## 2024-09-11 NOTE — CONSULTS
RD consult for Carbohydrate Controlled diet.    RD remote d/t inclement weather.  Will F/U with in-person education 9/12.  RD modified 2000 calorie diabetic duet to include Renal restrictions as well d/t ESRD on HD.  Ordered [facility] renal nutritional supplemented to augment oral intake.      Attached to AVS nutrition education materials on Diabetic Renal diet in case pt gets DC before RD visit.

## 2024-09-11 NOTE — HPI
Tabby Howard is a 34 year old female with a previous medical history of ESRD on dialysis Tue/Thur/Sat, chronic abdominal pain, HTN, Type II Diabetes, who presented to the ED  for left-sided flank pain. HPI is limited to chart review and ED note due to patient somnolence.  Per chart review, patient has multiple similar episodes in the past and has had multiple previous CT scans performed.  Also has had multiple hospitalizations for DKA in the past.  Patient had dialysis earlier today, and following this, she had bilateral flank pain.  Left was worse than right.  She states that she has been taking her long-acting insulin.  She initially presented to [facility].  She had some lab work performed there which did show an anion gap with hyperglycemia that was elevated.  At the outside facility, she repeatedly requested opioid analgesia which was not indicated at that time.  Patient then stated that she would leave and go to another hospital.  [facility] and came to [facility].  Upon arrival, complaining of bilateral with left-greater-than-right flank pain.  States that she has been using her insulin although she has a long history of insulin noncompliance. Patient administered 2mg of ativan and 12.5 of IV phenergan in ED with alleviation of her pain symptoms. She was initiated on an insulin drip for DKA and admitted by hospital medicine for further evaluation and management.

## 2024-09-11 NOTE — SUBJECTIVE & OBJECTIVE
Past Medical History:   Diagnosis Date    ESRD on hemodialysis 2022    Gastritis 2022    EGD was 22    Gastroparesis 2022    has not had Emptying study    Heart failure with preserved ejection fraction 2022    EF 55% on 3/22    History of supraventricular tachycardia     Hyperkalemia 2022    Hypertensive emergency 2022    Sickle cell trait 2022    Type 2 diabetes mellitus        Past Surgical History:   Procedure Laterality Date     SECTION      x 3    COLONOSCOPY      COLONOSCOPY N/A 2022    Procedure: COLONOSCOPY;  Surgeon: Jagdeep Cedeno MD;  Location: [facility];  Service: Endoscopy;  Laterality: N/A;    DESTRUCTION, CILIARY BODY, USING LASER Left 2024    Procedure: Cyclophotocoagulation G-probe, retrobulbar chlorpromazine left eye;  Surgeon: Fidel Wise MD;  Location: [address];  Service: Ophthalmology;  Laterality: Left;    ENDOSCOPIC ULTRASOUND OF UPPER GASTROINTESTINAL TRACT N/A 2023    Procedure: ULTRASOUND, UPPER GI TRACT, ENDOSCOPIC;  Surgeon: Micky Paredes III, MD;  Location: [facility];  Service: Endoscopy;  Laterality: N/A;    ESOPHAGOGASTRODUODENOSCOPY N/A 10/18/2019    Procedure: ESOPHAGOGASTRODUODENOSCOPY (EGD);  Surgeon: Gianluca Mendez MD;  Location: [facility];  Service: Endoscopy;  Laterality: N/A;    ESOPHAGOGASTRODUODENOSCOPY N/A 2022    Procedure: EGD (ESOPHAGOGASTRODUODENOSCOPY);  Surgeon: Micky Paredes III, MD;  Location: [facility];  Service: Endoscopy;  Laterality: N/A;    ESOPHAGOGASTRODUODENOSCOPY N/A 2022    Procedure: EGD (ESOPHAGOGASTRODUODENOSCOPY);  Surgeon: Marcelo Zhong MD;  Location: [facility] ENDO;  Service: Endoscopy;  Laterality: N/A;    EYE SURGERY      INSERTION, CATHETER, TUNNELED N/A 2023    Procedure: Insertion,catheter,tunneled;  Surgeon: Carlos Thurman Jr., MD;  Location: [facility];  Service: General;  Laterality: N/A;    LAPAROSCOPIC CHOLECYSTECTOMY N/A  "07/30/2022    Procedure: CHOLECYSTECTOMY, LAPAROSCOPIC;  Surgeon: Grey Perez MD;  Location: [facility] OR;  Service: General;  Laterality: N/A;    PLACEMENT OF DUAL-LUMEN VASCULAR CATHETER Left 07/12/2022    Procedure: INSERTION-CATHETER-JOSEPH;  Surgeon: Dionte Gan MD;  Location: [facility] OR;  Service: General;  Laterality: Left;    PLACEMENT OF DUAL-LUMEN VASCULAR CATHETER Right 07/26/2022    Procedure: INSERTION-CATHETER-Hemosplit;  Surgeon: Dionte Gan MD;  Location: [facility] OR;  Service: General;  Laterality: Right;       Review of patient's allergies indicates:   Allergen Reactions    Droperidol Hives    Penicillins Hives    Droperidol (bulk) Anxiety     STATES "FREAKS OUT".  TOLERATES HALDOL PRIOR TO ARRIVAL AT ANOTHER FACILITY TODAY.        Current Facility-Administered Medications on File Prior to Encounter   Medication    [COMPLETED] acetaminophen tablet 650 mg    [COMPLETED] hydrALAZINE tablet 50 mg     Current Outpatient Medications on File Prior to Encounter   Medication Sig    acetaZOLAMIDE (DIAMOX) 250 MG tablet take 1 tablet by mouth 3 times a day    albuterol (VENTOLIN HFA) 90 mcg/actuation inhaler Inhale 2 puffs every 4 hours by inhalation route.    apixaban (ELIQUIS) 5 mg Tab Take 1 tablet (5 mg total) by mouth 2 (two) times daily. Patient w/ thrombocytopenia <50, so held during admission, follow-up with hematologist about restarting    atropine 1% (ISOPTO ATROPINE) 1 % Drop Place 1 drop into the left eye 2 (two) times a day.    blood sugar diagnostic (TRUE METRIX GLUCOSE TEST STRIP) Strp Use 1 strip to check blood glucose three times daily    blood-glucose meter (TRUE METRIX GLUCOSE METER) Misc Use to check blood glucose    blood-glucose meter,continuous Misc USE WITH SENSORS    blood-glucose sensor (DEXCOM G7 SENSOR) Heather Use as directed for CGM. Change sensor every 10 days    blood-glucose sensor Heather CHANGE EVERY 10 DAYS    brimonidine 0.15 % OPTH DROP (ALPHAGAN) 0.15 % ophthalmic solution " "Place 1 drop into both eyes 3 (three) times daily.    brinzolamide (AZOPT) 1 % ophthalmic suspension Place1 drop in left eye 3 times a day for 30 days    busPIRone (BUSPAR) 10 MG tablet Take 1 tablet (10 mg total) by mouth 3 (three) times daily as needed (anxiety).    cetirizine (ZYRTEC) 10 MG tablet Take 1 tablet every day by oral route.    dorzolamide-timolol 2-0.5% (COSOPT) 22.3-6.8 mg/mL ophthalmic solution place 1 drop in left eye twice a day  for 30 days    FLUoxetine 40 MG capsule Take 1 capsule every day by oral route.    fluticasone propionate (FLONASE ALLERGY RELIEF) 50 mcg/actuation nasal spray [place] 1 spray every day by intranasal route.    gabapentin (NEURONTIN) 300 MG capsule Take 2 capsules twice a day by oral route for 90 days.    hydrALAZINE (APRESOLINE) 50 MG tablet Take 1 tablet (50 mg total) by mouth every 8 (eight) hours.    insulin aspart U-100 (NOVOLOG) 100 unit/mL (3 mL) InPn pen Inject 8 Units into the skin 3 (three) times daily with meals.    insulin glargine U-100, Lantus, (LANTUS SOLOSTAR U-100 INSULIN) 100 unit/mL (3 mL) InPn pen Inject 22 units every day by subcutaneous route in the evening for 30 days, for diabetes.    lancets (TRUEPLUS LANCETS) 33 gauge Misc Use 1 to check blood glucose three times daily    levETIRAcetam (KEPPRA) 500 MG Tab Take 1 tablet (500 mg total) by mouth 2 (two) times daily.    LORazepam (ATIVAN) 0.5 MG tablet Take 1 tablet 3 times a day by oral route for 7 days, for severe panic.    ondansetron (ZOFRAN-ODT) 4 MG TbDL Place 1 tablet (4 mg) on or under the tongue every 8 hours for 10 days.    oxyCODONE-acetaminophen (PERCOCET)  mg per tablet Take 1 tablet by mouth every 4 (four) hours as needed for Pain.    pantoprazole (PROTONIX) 40 MG tablet Take 1 tablet (40 mg total) by mouth once daily.    pen needle, diabetic 31 gauge x 3/16" Ndle Use as directed with insulin once daily    prednisoLONE acetate (PRED FORTE) 1 % DrpS Place 1 drop into the left eye 2 " (two) times daily.    promethazine (PHENERGAN) 25 MG tablet Take 1 tablet (25 mg total) by mouth every 6 (six) hours as needed.    sevelamer carbonate (RENVELA) 800 mg Tab Take 1 tablet (800 mg total) by mouth 3 (three) times daily with meals.    sucralfate (CARAFATE) 1 gram tablet Take 1 tablet (1 g total) by mouth 4 (four) times daily.    timolol maleate 0.5% (TIMOPTIC) 0.5 % Drop Place 1 drop in left eye 2 times a day for 30 days    [DISCONTINUED] atenoloL (TENORMIN) 50 MG tablet Take 1 tablet (50 mg total) by mouth every other day.    [DISCONTINUED] insulin detemir U-100, Levemir, (LEVEMIR FLEXTOUCH U100 INSULIN) 100 unit/mL (3 mL) InPn pen Inject 22 units every day by subcutaneous route in the evening for 90 days.    [DISCONTINUED] omeprazole (PRILOSEC) 20 MG capsule Take 2 capsules (40 mg total) by mouth once daily. for 10 days     Family History       Problem Relation (Age of Onset)    Diabetes Mother, Father          Tobacco Use    Smoking status: Never    Smokeless tobacco: Never   Substance and Sexual Activity    Alcohol use: Not Currently    Drug use: No    Sexual activity: Not Currently     Partners: Male     Birth control/protection: I.U.D.     Review of Systems   Unable to perform ROS: Mental status change (Patient not receptive to questions)     Objective:     Vital Signs (Most Recent):  Temp: 98.1 °F (36.7 °C) (09/10/24 1607)  Pulse: 100 (09/10/24 1607)  Resp: 19 (09/10/24 1607)  BP: (!) 189/90 (09/10/24 1607)  SpO2: 100 % (09/10/24 1814) Vital Signs (24h Range):  Temp:  [97.9 °F (36.6 °C)-99.3 °F (37.4 °C)] 98.1 °F (36.7 °C)  Pulse:  [] 100  Resp:  [19-22] 19  SpO2:  [97 %-100 %] 100 %  BP: (162-224)/() 189/90     Weight: 53.1 kg (117 lb)  Body mass index is 21.4 kg/m².     Physical Exam  Vitals reviewed.   Constitutional:       General: She is not in acute distress.  HENT:      Head: Normocephalic and atraumatic.      Nose: Nose normal.      Mouth/Throat:      Mouth: Mucous membranes  are dry.      Pharynx: Oropharynx is clear.   Eyes:      Pupils: Pupils are equal, round, and reactive to light.   Cardiovascular:      Rate and Rhythm: Normal rate and regular rhythm.      Pulses: Normal pulses.      Heart sounds: Normal heart sounds.      Comments: Left arm AV access palpable pulse   Pulmonary:      Effort: Pulmonary effort is normal.      Breath sounds: Normal breath sounds.   Abdominal:      General: Bowel sounds are normal. There is no distension.      Palpations: Abdomen is soft.   Musculoskeletal:         General: Normal range of motion.      Cervical back: Normal range of motion and neck supple.   Skin:     General: Skin is warm and dry.      Capillary Refill: Capillary refill takes less than 2 seconds.   Neurological:      GCS: GCS eye subscore is 3. GCS verbal subscore is 5. GCS motor subscore is 6.      Comments: Somnolent but wakes to voice and answers questions and follows commands.               CRANIAL NERVES     CN III, IV, VI   Pupils are equal, round, and reactive to light.       Significant Labs: All pertinent labs within the past 24 hours have been reviewed.  A1C:   Recent Labs   Lab 06/23/24  0712 09/10/24  2106   HGBA1C 8.5* 9.0*     ABGs:   Recent Labs   Lab 09/10/24  1814   PH 7.357   PCO2 43.9   HCO3 24.6   POCSATURATED 93   BE -1   PO2 70     Bilirubin:   Recent Labs   Lab 08/31/24  0801 09/08/24  0331 09/08/24  1235 09/10/24  1255 09/10/24  1745   BILITOT 0.5 0.9 0.6 1.0 1.1*     BMP:   Recent Labs   Lab 09/10/24  2106   *      K 4.3   CL 97   CO2 23   BUN 33*   CREATININE 8.6*   CALCIUM 8.8     CBC:   Recent Labs   Lab 09/10/24  1255 09/10/24  1745   WBC 8.57 10.24   HGB 10.3* 9.8*   HCT 30.5* 28.2*    245     CMP:   Recent Labs   Lab 09/10/24  1255 09/10/24  1745 09/10/24  2106    137 139   K 4.1 4.1 4.3   CL 93* 96 97   CO2 22* 19* 23   * 217* 213*   BUN 30* 32* 33*   CREATININE 7.6* 8.5* 8.6*   CALCIUM 9.5 9.3 8.8   PROT 7.5 7.4  --     ALBUMIN 4.1 3.4*  --    BILITOT 1.0 1.1*  --    ALKPHOS 99 99  --    AST 18 19  --    ALT 14 13  --    ANIONGAP 21* 22* 19*       Lipase:   Recent Labs   Lab 09/10/24  1255 09/10/24  1745   LIPASE 23 21       TSH:   Recent Labs   Lab 07/16/24  1610   TSH 1.355       Significant Imaging: I have reviewed all pertinent imaging results/findings most recently performed on 8/28/24  EXAMINATION:  XR CHEST AP PORTABLE     CLINICAL HISTORY:  End stage renal disease     TECHNIQUE:  Single frontal view of the chest was performed.     COMPARISON:  08/22/2024.     FINDINGS:  Stable cardiomegaly.     Heart and lungs  appear unchanged when allowing for differences in technique and positioning.     Impression:     Stable cardiomegaly.     No significant change from prior study.        Electronically signed by:Sandeep Brower MD  Date:                                            08/28/2024  Time:                                           21:42    EXAMINATION:  CT RENAL STONE STUDY ABD PELVIS WO     CLINICAL HISTORY:  Flank pain, kidney stone suspected;     TECHNIQUE:  Low dose axial images, sagittal and coronal reformations were obtained from the lung bases to the pubic symphysis.  Contrast was not administered.     COMPARISON:  08/20/2024.     FINDINGS:  Heart: Cardiomegaly with moderate pericardial effusion, similar compared to prior.     Lung Bases: Increased vascular congestion at the lung bases with central perivascular edema.  No pleural effusions.     Liver: Normal in size and attenuation, with no focal hepatic lesions.     Gallbladder: Surgically absent.     Bile Ducts: No evidence of dilated ducts.     Pancreas: Unchanged from prior.     Spleen: Unchanged from prior.     Adrenals: Unremarkable.     Kidneys/ Ureters: Bilateral renal atrophy.  Extensive renal vascular calcifications.  No renal calculi or hydronephrosis.     Bladder: Diffuse bladder wall thickening, similar to prior.     Reproductive organs: Right adnexal  enlargement, nonspecific, similar compared to recent prior.  Extensive vascular calcifications in the adnexa.     GI Tract/Mesentery: No evidence of bowel obstruction.  Bowel loops appears similar to prior.     Peritoneal Space: No ascites. No free air.     Retroperitoneum: Enlarged left periaortic lymph nodes, unchanged from recent prior.     Abdominal wall: Anasarca, similar to prior.  Postsurgical changes abdominal wall, similar to prior.     Vasculature: Extensive vascular calcifications present, similar compared to prior study.  No abdominal aortic aneurysm.     Bones: No acute fracture.     Impression:     Cardiomegaly with moderate pericardial effusion, similar to prior.  Interval development of vascular congestion and perivascular edema at the lung bases.     Anasarca, similar to prior.     Bilateral renal atrophy.  No renal/ureteral calculi or hydroureteronephrosis.     Diffuse bladder wall thickening, nonspecific, unchanged from prior.     Left periaortic lymph node enlargement, unchanged from recent prior.     Right adnexal enlargement, nonspecific, unchanged from recent prior.     Extensive vascular calcifications, unchanged from recent prior.     Additional findings as above.        Electronically signed by:Sandeep Brower MD  Date:                                            08/28/2024  Time:                                           21:12

## 2024-09-11 NOTE — PLAN OF CARE
09/10/24 6322   Patient Assessment/Suction   Level of Consciousness (AVPU) alert   Respiratory Effort Normal;Unlabored   Rhythm/Pattern, Respiratory pattern regular   Cough Frequency no cough   PRE-TX-O2   Device (Oxygen Therapy) room air   SpO2 97 %   Pulse Oximetry Type Continuous   Pulse 77   Resp 15   Aerosol Therapy   $ Aerosol Therapy Charges PRN treatment not required   Tobacco Cessation Intervention   Do you use any type of tobacco product? No   Respiratory Evaluation   $ Care Plan Tech Time 15 min   $ Respiratory Evaluation Complete   Evaluation For New Orders   Admitting Diagnosis DKA   Cardiac Diagnosis HTN   Home Oxygen   Has Home Oxygen? No   Home Aerosol, MDI, DPI, and Other Treatments/Therapies   Home Respiratory Therapy Per Patient/Review of Chart Yes   MDI Home Meds/Freq albuterol 2 puffs prn   Oxygen Care Plan   Oxygen Care Plan Per Protocol   SPO2 Goal (%) 92% non-cardiac   Rationale SpO2 is <92% (Non-cardiac Pt.)   Bronchodilator Care Plan   Bronchodilator Care Plan Aerosol   Aerosol Meds w/ frequency Albuterol - Ipratropium (DUO-NEB) 0.5mg-3mg(2.5ml) 3ml Nebulizer Solution2.5mg PRN   Rationale Maintain home respiratory medicine   Atelectasis Care Plan   Rationale No Rational Found   Airway Clearance Care Plan   Rationale No rationale found

## 2024-09-11 NOTE — ASSESSMENT & PLAN NOTE
Patient's FSGs are uncontrolled due to hyperglycemia on current medication regimen.  Last A1c reviewed-   Lab Results   Component Value Date    HGBA1C 8.5 (H) 06/23/2024     Most recent fingerstick glucose reviewed-   Recent Labs   Lab 09/10/24  1934 09/10/24  2013   POCTGLUCOSE 205* 208*     Current correctional scale   Insulin Infusion  Increase anti-hyperglycemic dose as follows-   Antihyperglycemics (From admission, onward)      Start     Stop Route Frequency Ordered    09/10/24 1915  insulin regular 1 Units/mL in 0.9% NaCl 100 mL infusion        Question Answer Comment   Insulin Rate Adjustment (DO NOT MODIFY ANSWER) [email or web address]\[email or web address] ADJUSTMENT DKA version [email or web address]    Initial dose (DO NOT CHANGE): 0.1 units/kg/hr        -- IV Continuous 09/10/24 1908          Hold Oral hypoglycemics while patient is in the hospital.  NPO

## 2024-09-11 NOTE — PLAN OF CARE
Pt arrived to the floor just before 2200 on 9/10/24, pt oriented to room, how to use the call light, and purpose of bed alarm being on. Reviewed PoC with pt, they verbalized understanding and willingness to participate. VSS, NAD at this time, no c/o pain, will continue to monitor.

## 2024-09-11 NOTE — ASSESSMENT & PLAN NOTE
Patient's FSGs are uncontrolled due to hyperglycemia on current medication regimen.  Last A1c reviewed-   Lab Results   Component Value Date    HGBA1C 9.0 (H) 09/10/2024     Most recent fingerstick glucose reviewed-   Recent Labs   Lab 09/11/24  0436 09/11/24  0533 09/11/24  0628 09/11/24  0737   POCTGLUCOSE 154* 178* 183* 195*     Current correctional scale  Medium  Maintain anti-hyperglycemic dose as follows-   Antihyperglycemics (From admission, onward)      Start     Stop Route Frequency Ordered    09/11/24 2100  insulin glargine U-100 (Lantus) pen 15 Units         -- SubQ Nightly 09/11/24 1030    09/11/24 1200  insulin aspart U-100 pen 8 Units         -- SubQ 3 times daily with meals 09/11/24 1030    09/11/24 0856  insulin aspart U-100 pen 0-10 Units         -- SubQ Before meals & nightly PRN 09/11/24 0757          Hold Oral hypoglycemics while patient is in the hospital.

## 2024-09-11 NOTE — ASSESSMENT & PLAN NOTE
Patient's FSGs are uncontrolled due to hyperglycemia on current medication regimen.  Last A1c reviewed-   Lab Results   Component Value Date    HGBA1C 9.0 (H) 09/10/2024     Most recent fingerstick glucose reviewed-   Recent Labs   Lab 09/11/24  0436 09/11/24  0533 09/11/24  0628 09/11/24  0737   POCTGLUCOSE 154* 178* 183* 195*       Current correctional scale   Insulin Infusion  Increase anti-hyperglycemic dose as follows-   Antihyperglycemics (From admission, onward)    Start     Stop Route Frequency Ordered    09/11/24 2100  insulin glargine U-100 (Lantus) pen 15 Units         -- SubQ Nightly 09/11/24 1030    09/11/24 1200  insulin aspart U-100 pen 8 Units         -- SubQ 3 times daily with meals 09/11/24 1030    09/11/24 0856  insulin aspart U-100 pen 0-10 Units         -- SubQ Before meals & nightly PRN 09/11/24 0757        Hold Oral hypoglycemics while patient is in the hospital.  NPO

## 2024-09-11 NOTE — ASSESSMENT & PLAN NOTE
Anemia is likely due to chronic disease due to ESRD. Most recent hemoglobin and hematocrit are listed below.  Recent Labs     09/08/24  1235 09/10/24  1255 09/10/24  1745   HGB 9.2* 10.3* 9.8*   HCT 27.7* 30.5* 28.2*     Plan  - Monitor serial CBC: Daily  - Transfuse PRBC if patient becomes hemodynamically unstable, symptomatic or H/H drops below 7/21.  - Patient has not received any PRBC transfusions to date  - Patient's anemia is currently stable

## 2024-09-11 NOTE — CONSULTS
"INPATIENT NEPHROLOGY CONSULT   [facility]    Patient Name: Tabby Howard  Date: 09/11/2024    Reason for consultation: ESRD    Chief Complaint:   Chief Complaint   Patient presents with    Flank Pain     Bilateral flank pain, was seen at [facility] and discharged within the hour, states "I got tylenol but I need dilaudid or morphine, I can't handle the pain"        History of Present Illness:  Tabby Howard is a 34 year old female with a previous medical history of ESRD on dialysis Tue/Thur/Sat, chronic abdominal pain, HTN, Type II Diabetes, who presented to the ED for left-sided flank pain. HPI is limited to chart review and ED note due to patient somnolence. Per chart review, patient has multiple similar episodes in the past and has had multiple previous CT scans performed. Also has had multiple hospitalizations for DKA in the past. Patient had dialysis earlier today, and following this, she had bilateral flank pain. Left was worse than right. She states that she has been taking her long-acting insulin. She initially presented to [facility]. She had some lab work performed there which did show an anion gap with hyperglycemia that was elevated. At the outside facility, she repeatedly requested opioid analgesia which was not indicated at that time. Patient then stated that she would leave and go to another hospital. [facility] and came to [facility]. Upon arrival, complaining of bilateral with left-greater-than-right flank pain. States that she has been using her insulin although she has a long history of insulin noncompliance. Patient administered 2mg of ativan and 12.5 of IV phenergan in ED with alleviation of her pain symptoms. She was initiated on an insulin drip for DKA and admitted by hospital medicine for further evaluation and management. Consulted for dialysis.    Interval History:  9/11- missed HD yest- ordered HD today    Plan of Care:    Assessment:  DKA  ESRD on HD " TTS  HTN  SHPT  Anemia of CKD    Plan:    - on DKA Pathway  - HD today- then TTS  - resume home BP meds when able to take PO  - resume binder with meals when able to take PO  - will give SHELLEY with HD TTS    Thank you for allowing us to participate in this patient's care. We will continue to follow.    Vital Signs:  Temp Readings from Last 3 Encounters:   24 98 °F (36.7 °C) (Oral)   09/10/24 99.3 °F (37.4 °C) (Oral)   24 98.3 °F (36.8 °C) (Oral)       Pulse Readings from Last 3 Encounters:   24 83   09/10/24 96   24 70       BP Readings from Last 3 Encounters:   24 (!) 179/65   09/10/24 (!) 195/96   24 135/85       Weight:  Wt Readings from Last 3 Encounters:   09/10/24 52.7 kg (116 lb 2.9 oz)   09/10/24 53.1 kg (117 lb)   24 53.1 kg (117 lb)       Past Medical & Surgical History:  Past Medical History:   Diagnosis Date    ESRD on hemodialysis 2022    Gastritis 2022    EGD was 22    Gastroparesis 2022    has not had Emptying study    Heart failure with preserved ejection fraction 2022    EF 55% on 3/22    History of supraventricular tachycardia     Hyperkalemia 2022    Hypertensive emergency 2022    Sickle cell trait 2022    Type 2 diabetes mellitus        Past Surgical History:   Procedure Laterality Date     SECTION      x 3    COLONOSCOPY      COLONOSCOPY N/A 2022    Procedure: COLONOSCOPY;  Surgeon: Jagdeep Cedeno MD;  Location: [facility];  Service: Endoscopy;  Laterality: N/A;    DESTRUCTION, CILIARY BODY, USING LASER Left 2024    Procedure: Cyclophotocoagulation G-probe, retrobulbar chlorpromazine left eye;  Surgeon: Fidel Wise MD;  Location: [address];  Service: Ophthalmology;  Laterality: Left;    ENDOSCOPIC ULTRASOUND OF UPPER GASTROINTESTINAL TRACT N/A 2023    Procedure: ULTRASOUND, UPPER GI TRACT, ENDOSCOPIC;  Surgeon: Micky Paredes III, MD;  Location: [facility];  Service:  Endoscopy;  Laterality: N/A;    ESOPHAGOGASTRODUODENOSCOPY N/A 10/18/2019    Procedure: ESOPHAGOGASTRODUODENOSCOPY (EGD);  Surgeon: Gianluca Mendez MD;  Location: [facility];  Service: Endoscopy;  Laterality: N/A;    ESOPHAGOGASTRODUODENOSCOPY N/A 08/24/2022    Procedure: EGD (ESOPHAGOGASTRODUODENOSCOPY);  Surgeon: Micky Paredes III, MD;  Location: [facility] ENDO;  Service: Endoscopy;  Laterality: N/A;    ESOPHAGOGASTRODUODENOSCOPY N/A 12/05/2022    Procedure: EGD (ESOPHAGOGASTRODUODENOSCOPY);  Surgeon: Marcelo Zhong MD;  Location: [facility];  Service: Endoscopy;  Laterality: N/A;    EYE SURGERY      INSERTION, CATHETER, TUNNELED N/A 06/17/2023    Procedure: Insertion,catheter,tunneled;  Surgeon: Carlos Thurman Jr., MD;  Location: [facility] OR;  Service: General;  Laterality: N/A;    LAPAROSCOPIC CHOLECYSTECTOMY N/A 07/30/2022    Procedure: CHOLECYSTECTOMY, LAPAROSCOPIC;  Surgeon: Grey Perez MD;  Location: [facility] OR;  Service: General;  Laterality: N/A;    PLACEMENT OF DUAL-LUMEN VASCULAR CATHETER Left 07/12/2022    Procedure: INSERTION-CATHETER-JOSEPH;  Surgeon: Dionte Gan MD;  Location: [facility] OR;  Service: General;  Laterality: Left;    PLACEMENT OF DUAL-LUMEN VASCULAR CATHETER Right 07/26/2022    Procedure: INSERTION-CATHETER-Hemosplit;  Surgeon: Dionte Gan MD;  Location: [facility] OR;  Service: General;  Laterality: Right;       Past Social History:  Social History     Socioeconomic History    Marital status:    Tobacco Use    Smoking status: Never    Smokeless tobacco: Never   Substance and Sexual Activity    Alcohol use: Not Currently    Drug use: No    Sexual activity: Not Currently     Partners: Male     Birth control/protection: I.U.D.     Social Determinants of Health     Financial Resource Strain: Low Risk  (8/29/2024)    Overall Financial Resource Strain (CARDIA)     Difficulty of Paying Living Expenses: Not hard at all   Recent Concern: Financial Resource Strain - Medium Risk  (8/25/2024)    Overall Financial Resource Strain (CARDIA)     Difficulty of Paying Living Expenses: Somewhat hard   Food Insecurity: No Food Insecurity (8/29/2024)    Hunger Vital Sign     Worried About Running Out of Food in the Last Year: Never true     Ran Out of Food in the Last Year: Never true   Recent Concern: Food Insecurity - Food Insecurity Present (8/25/2024)    Hunger Vital Sign     Worried About Running Out of Food in the Last Year: Often true     Ran Out of Food in the Last Year: Often true   Transportation Needs: No Transportation Needs (8/29/2024)    TRANSPORTATION NEEDS     Transportation : No   Physical Activity: Sufficiently Active (8/29/2024)    Exercise Vital Sign     Days of Exercise per Week: 5 days     Minutes of Exercise per Session: 30 min   Recent Concern: Physical Activity - Inactive (8/25/2024)    Exercise Vital Sign     Days of Exercise per Week: 0 days     Minutes of Exercise per Session: 0 min   Stress: No Stress Concern Present (8/29/2024)    [employer] - Occupational Stress Questionnaire     Feeling of Stress : Not at all   Recent Concern: Stress - Stress Concern Present (8/25/2024)    [employer] - Occupational Stress Questionnaire     Feeling of Stress : Rather much   Housing Stability: Low Risk  (8/29/2024)    Housing Stability Vital Sign     Unable to Pay for Housing in the Last Year: No     Homeless in the Last Year: No   Recent Concern: Housing Stability - High Risk (8/25/2024)    Housing Stability Vital Sign     Unable to Pay for Housing in the Last Year: Yes     Homeless in the Last Year: No       Medications:  Scheduled Meds:   apixaban  5 mg Oral BID    atropine 1%  1 drop Left Eye BID    brimonidine 0.15 % OPTH DROP  1 drop Both Eyes TID    levETIRAcetam (Keppra) IV (PEDS and ADULTS)  500 mg Intravenous Q12H    pantoprazole  40 mg Intravenous Daily     Continuous Infusions:   D5 and 0.45% NaCl   Intravenous  Continuous PRN   Stopped at 09/11/24 0845     PRN Meds:.  Current Facility-Administered Medications:     acetaminophen, 650 mg, Oral, Q4H PRN    albuterol-ipratropium, 3 mL, Nebulization, Q6H PRN    dextrose 10%, 12.5 g, Intravenous, PRN    dextrose 10%, 25 g, Intravenous, PRN    D5 and 0.45% NaCl, , Intravenous, Continuous PRN    glucagon (human recombinant), 1 mg, Intramuscular, PRN    glucose, 16 g, Oral, PRN    glucose, 24 g, Oral, PRN    hydrALAZINE, 20 mg, Intravenous, Q6H PRN    insulin aspart U-100, 0-10 Units, Subcutaneous, QID (AC + HS) PRN    ondansetron, 4 mg, Intravenous, Q6H PRN    promethazine (PHENERGAN) 12.5 mg in 0.9% NaCl 50 mL IVPB, 12.5 mg, Intravenous, Q6H PRN    sodium chloride 0.9%, 10 mL, Intravenous, PRN    sodium chloride 0.9%, 10 mL, Intravenous, PRN  Current Facility-Administered Medications on File Prior to Encounter   Medication Dose Route Frequency Provider Last Rate Last Admin    [COMPLETED] acetaminophen tablet 650 mg  650 mg Oral ED 1 Time Kaushik Patton MD   650 mg at 09/10/24 1341    [COMPLETED] hydrALAZINE tablet 50 mg  50 mg Oral ED 1 Time Kaushik Patton MD   50 mg at 09/10/24 1341     Current Outpatient Medications on File Prior to Encounter   Medication Sig Dispense Refill    acetaZOLAMIDE (DIAMOX) 250 MG tablet take 1 tablet by mouth 3 times a day 90 tablet 3    albuterol (VENTOLIN HFA) 90 mcg/actuation inhaler Inhale 2 puffs every 4 hours by inhalation route. 8 g 2    apixaban (ELIQUIS) 5 mg Tab Take 1 tablet (5 mg total) by mouth 2 (two) times daily. Patient w/ thrombocytopenia <50, so held during admission, follow-up with hematologist about restarting 180 tablet 1    atropine 1% (ISOPTO ATROPINE) 1 % Drop Place 1 drop into the left eye 2 (two) times a day. 15 mL 3    brimonidine 0.15 % OPTH DROP (ALPHAGAN) 0.15 % ophthalmic solution Place 1 drop into both eyes 3 (three) times daily. 15 mL 3    brinzolamide (AZOPT) 1 % ophthalmic suspension Place1 drop in  "left eye 3 times a day for 30 days 15 mL 6    busPIRone (BUSPAR) 10 MG tablet Take 1 tablet (10 mg total) by mouth 3 (three) times daily as needed (anxiety). 60 tablet 5    cetirizine (ZYRTEC) 10 MG tablet Take 1 tablet every day by oral route. 30 tablet 0    dorzolamide-timolol 2-0.5% (COSOPT) 22.3-6.8 mg/mL ophthalmic solution place 1 drop in left eye twice a day  for 30 days 10 mL 0    FLUoxetine 40 MG capsule Take 1 capsule every day by oral route. 30 capsule 2    fluticasone propionate (FLONASE ALLERGY RELIEF) 50 mcg/actuation nasal spray [place] 1 spray every day by intranasal route. 16 g 2    gabapentin (NEURONTIN) 300 MG capsule Take 2 capsules twice a day by oral route for 90 days. 360 capsule 1    hydrALAZINE (APRESOLINE) 50 MG tablet Take 1 tablet (50 mg total) by mouth every 8 (eight) hours. 90 tablet 0    insulin aspart U-100 (NOVOLOG) 100 unit/mL (3 mL) InPn pen Inject 8 Units into the skin 3 (three) times daily with meals. 15 mL 0    insulin glargine U-100, Lantus, (LANTUS SOLOSTAR U-100 INSULIN) 100 unit/mL (3 mL) InPn pen Inject 22 units every day by subcutaneous route in the evening for 30 days, for diabetes. 15 mL 2    levETIRAcetam (KEPPRA) 500 MG Tab Take 1 tablet (500 mg total) by mouth 2 (two) times daily. 60 tablet 11    LORazepam (ATIVAN) 0.5 MG tablet Take 1 tablet 3 times a day by oral route for 7 days, for severe panic. 21 tablet 2    ondansetron (ZOFRAN-ODT) 4 MG TbDL Place 1 tablet (4 mg) on or under the tongue every 8 hours for 10 days. 24 tablet 2    oxyCODONE-acetaminophen (PERCOCET)  mg per tablet Take 1 tablet by mouth every 4 (four) hours as needed for Pain. 42 tablet 0    pantoprazole (PROTONIX) 40 MG tablet Take 1 tablet (40 mg total) by mouth once daily. 30 tablet 0    pen needle, diabetic 31 gauge x 3/16" Ndle Use as directed with insulin once daily 100 each 0    prednisoLONE acetate (PRED FORTE) 1 % DrpS Place 1 drop into the left eye 2 (two) times daily. 5 mL 3    " promethazine (PHENERGAN) 25 MG tablet Take 1 tablet (25 mg total) by mouth every 6 (six) hours as needed. 15 tablet 0    sevelamer carbonate (RENVELA) 800 mg Tab Take 1 tablet (800 mg total) by mouth 3 (three) times daily with meals. 180 tablet 11    sucralfate (CARAFATE) 1 gram tablet Take 1 tablet (1 g total) by mouth 4 (four) times daily. 100 tablet 1    timolol maleate 0.5% (TIMOPTIC) 0.5 % Drop Place 1 drop in left eye 2 times a day for 30 days 5 mL 6    blood sugar diagnostic (TRUE METRIX GLUCOSE TEST STRIP) Strp Use 1 strip to check blood glucose three times daily 100 each 11    blood-glucose meter (TRUE METRIX GLUCOSE METER) Misc Use to check blood glucose 1 each 0    blood-glucose meter,continuous Misc USE WITH SENSORS 1 each 0    blood-glucose sensor (DEXCOM G7 SENSOR) Heather Use as directed for CGM. Change sensor every 10 days 3 each 0    blood-glucose sensor Heather CHANGE EVERY 10 DAYS 3 each 0    lancets (TRUEPLUS LANCETS) 33 gauge Misc Use 1 to check blood glucose three times daily 100 each 11    [DISCONTINUED] atenoloL (TENORMIN) 50 MG tablet Take 1 tablet (50 mg total) by mouth every other day. 45 tablet 3    [DISCONTINUED] insulin detemir U-100, Levemir, (LEVEMIR FLEXTOUCH U100 INSULIN) 100 unit/mL (3 mL) InPn pen Inject 22 units every day by subcutaneous route in the evening for 90 days. 18 mL 1    [DISCONTINUED] omeprazole (PRILOSEC) 20 MG capsule Take 2 capsules (40 mg total) by mouth once daily. for 10 days 20 capsule 0       Allergies:  Droperidol, Penicillins, and Droperidol (bulk)    Past Family History:  Reviewed; refer to Hospitalist Admission Note    Review of Systems:  Review of Systems - All 14 systems reviewed and negative, except as noted in HPI    Physical Exam:  General Appearance:    NAD, AAO x 3, cooperative, appears stated age   Head:    Normocephalic, atraumatic   Eyes:    PER, EOMI, and conjunctiva/sclera clear bilaterally       Mouth:   Moist mucus membranes, no thrush or oral  lesions,       normal dentition   Back:     No CVA tenderness   Lungs:     Clear to auscultation bilaterally, no wheezes, crackles,           rales or rhonchi, symmetric air movement, respirations unlabored   Chest wall:    No tenderness or deformity   Heart:    Regular rate and rhythm, S1 and S2 normal, no murmur, rub   or gallop   Abdomen:     Soft, tender, non-distended, bowel sounds active all four   quadrants, no RT or guarding, no masses, no organomegaly   Extremities:   Warm and well perfused, distal pulses are intact, no             cyanosis, + edema   MSK:   No joint or muscle swelling, tenderness or deformity   Skin:   Skin color, texture, turgor normal, no rashes or lesions   Neurologic/Psychiatric:   CNII-XII intact, normal strength and sensation       throughout, no asterixis; normal affect, memory, judgement     and insight      Results:  Lab Results   Component Value Date     09/11/2024    K 3.8 09/11/2024    CL 98 09/11/2024    CO2 24 09/11/2024    BUN 35 (H) 09/11/2024    CREATININE 8.8 (H) 09/11/2024    CALCIUM 8.6 (L) 09/11/2024    ANIONGAP 16 09/11/2024    ESTGFRAFRICA 19 (L) 09/03/2022    EGFRNONAA 18 (A) 07/31/2022       Lab Results   Component Value Date    CALCIUM 8.6 (L) 09/11/2024    PHOS 5.5 (H) 09/11/2024       Recent Labs   Lab 09/11/24  0420   WBC 6.47   RBC 3.02*   HGB 8.7*   HCT 26.3*      MCV 87   MCH 28.8   MCHC 33.1       I have personally reviewed pertinent radiological imaging and reports from this admission.    I have spent > 70 minutes providing care for this patient for the above diagnoses. These services have included chart/data/imaging review, evaluation, exam, formulation of plan, , note preparation, and discussions with staff involved in this patient's care.    Prateek Clark MD MPH  [facility]  [address] [place] [id number]  [phone] (p)  [phone] (f)

## 2024-09-11 NOTE — PLAN OF CARE
[facility] - Med/Surg  Initial Discharge Assessment       Primary Care Provider: Melony Kim NP    Admission Diagnosis: Diabetic ketoacidosis without coma associated with other specified diabetes mellitus [E13.10]    Admission Date: 9/10/2024  Expected Discharge Date:     Spoke to pt at bedside. Pt lives at listed address with 3 dependent children. PCP Julio at [facility]. Pharmacy [facility]. Pt with recent admit to [facility] on 8/24 and 8/28. Pt does HD at [facility]. Dad to provide transport home. CM to follow    Transition of Care Barriers: None    Payor: MEDICAID / Plan: HEALTHY BLUE [place] / Product Type: Managed Medicaid /     Extended Emergency Contact Information  Primary Emergency Contact: Tanya Howard  Address: [address] [place] [address], [place] [address] [facility]  Home Phone: [phone]  Mobile Phone: [phone]  Relation: Step parent  Preferred language: English   needed? No  Secondary Emergency Contact: Garcia Howard  Address: [address] [place] [address], [place] [address]  Mobile Phone: [phone]  Relation: Father  Preferred language: English   needed? No  Mother: Svetlana Freire  Mobile Phone: [phone]    Discharge Plan A: Home with family  Discharge Plan B: Home      [facility] [address]  [facility] [address]  Phone: [phone] Fax: [phone]      Initial Assessment (most recent)       Adult Discharge Assessment - 09/11/24 1031          Discharge Assessment    Assessment Type Discharge Planning Assessment     Confirmed/corrected address, phone number and insurance Yes     Confirmed Demographics Correct on Facesheet     Source of Information patient     People in Home child(tammy), dependent     Do you expect to return to your current living situation? Yes     Prior to hospitilization cognitive status: Alert/Oriented     Current cognitive status: Alert/Oriented     Walking or Climbing Stairs Difficulty no      Dressing/Bathing Difficulty no     Equipment Currently Used at Home none     Readmission within 30 days? Yes     Do you currently have service(s) that help you manage your care at home? No     Do you take prescription medications? Yes     Do you have prescription coverage? Yes     Do you have any problems affording any of your prescribed medications? No     Is the patient taking medications as prescribed? yes     How do you get to doctors appointments? family or friend will provide     Are you on dialysis? Yes     Do you take coumadin? No     Discharge Plan A Home with family     Discharge Plan B Home     DME Needed Upon Discharge  none     Discharge Plan discussed with: Patient     Transition of Care Barriers None

## 2024-09-11 NOTE — PROGRESS NOTES
[facility] Medicine  Progress Note    Patient Name: Tabby Howard  MRN: [id number]  Patient Class: OP- Observation   Admission Date: 9/10/2024  Length of Stay: 0 days  Attending Physician: Oneyda Hinds MD  Primary Care Provider: Melony Kim NP        Subjective:     Principal Problem:High anion gap metabolic acidosis        HPI:  Tabby Howard is a 34 year old female with a previous medical history of ESRD on dialysis Tue/Thur/Sat, chronic abdominal pain, HTN, Type II Diabetes, who presented to the ED  for left-sided flank pain. HPI is limited to chart review and ED note due to patient somnolence.  Per chart review, patient has multiple similar episodes in the past and has had multiple previous CT scans performed.  Also has had multiple hospitalizations for DKA in the past.  Patient had dialysis earlier today, and following this, she had bilateral flank pain.  Left was worse than right.  She states that she has been taking her long-acting insulin.  She initially presented to [facility].  She had some lab work performed there which did show an anion gap with hyperglycemia that was elevated.  At the outside facility, she repeatedly requested opioid analgesia which was not indicated at that time.  Patient then stated that she would leave and go to another hospital.  [facility] and came to [facility].  Upon arrival, complaining of bilateral with left-greater-than-right flank pain.  States that she has been using her insulin although she has a long history of insulin noncompliance. Patient administered 2mg of ativan and 12.5 of IV phenergan in ED with alleviation of her pain symptoms. She was initiated on an insulin drip for DKA and admitted by hospital medicine for further evaluation and management.     Overview/Hospital Course:  No notes on file    Interval History:   Seen and examined at multidisciplinary rounds, patient's presentation does not support  diagnosis of DKA, patient has metabolic acidosis most likely secondary to ESRD and Diamox.  Beta hydroxybutyrate negative.  Patient has a long history of gastroparesis.  Patient has chronic abdominal pain.  Patient received IV Ativan this morning and became lethargic.     Review of Systems   Gastrointestinal:  Positive for abdominal pain.     Objective:     Vital Signs (Most Recent):  Temp: 98.3 °F (36.8 °C) (09/11/24 0800)  Pulse: 83 (09/11/24 0830)  Resp: 16 (09/11/24 0830)  BP: (!) 179/65 (09/11/24 0800)  SpO2: 98 % (09/11/24 0830) Vital Signs (24h Range):  Temp:  [97.7 °F (36.5 °C)-99.3 °F (37.4 °C)] 98.3 °F (36.8 °C)  Pulse:  [] 83  Resp:  [12-33] 16  SpO2:  [76 %-100 %] 98 %  BP: (159-225)/() 179/65     Weight: 52.7 kg (116 lb 2.9 oz)  Body mass index is 21.25 kg/m².    Intake/Output Summary (Last 24 hours) at 9/11/2024 1129  Last data filed at 9/11/2024 0712  Gross per 24 hour   Intake 1889.02 ml   Output --   Net 1889.02 ml         Physical Exam  Constitutional:       Appearance: She is ill-appearing.   HENT:      Head: Normocephalic and atraumatic.      Nose: Nose normal.   Eyes:      Extraocular Movements: Extraocular movements intact.      Pupils: Pupils are equal, round, and reactive to light.   Cardiovascular:      Rate and Rhythm: Normal rate and regular rhythm.      Heart sounds: No murmur heard.  Pulmonary:      Effort: Pulmonary effort is normal.      Breath sounds: Normal breath sounds.   Abdominal:      General: Abdomen is flat.      Palpations: Abdomen is soft.      Tenderness: There is abdominal tenderness.   Musculoskeletal:         General: Normal range of motion.      Cervical back: Normal range of motion and neck supple.   Skin:     General: Skin is warm and dry.   Neurological:      General: No focal deficit present.      Mental Status: She is alert.      Comments: Lethargic   Psychiatric:         Mood and Affect: Mood normal.             Significant Labs: All pertinent labs  within the past 24 hours have been reviewed.  CBC:   Recent Labs   Lab 09/10/24  1255 09/10/24  1745 09/11/24  0420   WBC 8.57 10.24 6.47   HGB 10.3* 9.8* 8.7*   HCT 30.5* 28.2* 26.3*    245 200     CMP:   Recent Labs   Lab 09/10/24  1255 09/10/24  1745 09/10/24  2106 09/11/24  0031 09/11/24  0420    137 139 140 138   K 4.1 4.1 4.3 3.9 3.8   CL 93* 96 97 100 98   CO2 22* 19* 23 25 24   * 217* 213* 80 173*   BUN 30* 32* 33* 33* 35*   CREATININE 7.6* 8.5* 8.6* 8.7* 8.8*   CALCIUM 9.5 9.3 8.8 8.7 8.6*   PROT 7.5 7.4  --   --   --    ALBUMIN 4.1 3.4*  --   --   --    BILITOT 1.0 1.1*  --   --   --    ALKPHOS 99 99  --   --   --    AST 18 19  --   --   --    ALT 14 13  --   --   --    ANIONGAP 21* 22* 19* 15 16       Significant Imaging: I have reviewed all pertinent imaging results/findings within the past 24 hours.  I have reviewed and interpreted all pertinent imaging results/findings within the past 24 hours.    Scheduled Meds:   apixaban  5 mg Oral BID    atropine 1%  1 drop Left Eye BID    brimonidine 0.15 % OPTH DROP  1 drop Both Eyes TID    dorzolamide-timolol 2-0.5%  1 drop Left Eye Q12H    FLUoxetine  40 mg Oral Daily    insulin aspart U-100  8 Units Subcutaneous TID WM    insulin glargine U-100  15 Units Subcutaneous QHS    levETIRAcetam  500 mg Oral BID    pantoprazole  40 mg Intravenous Daily    sevelamer carbonate  800 mg Oral TID WM    sucralfate  1 g Oral QID    timolol maleate 0.5%  1 drop Left Eye BID     Continuous Infusions:   D5 and 0.45% NaCl   Intravenous Continuous PRN   Stopped at 09/11/24 0845     PRN Meds:.  Current Facility-Administered Medications:     acetaminophen, 650 mg, Oral, Q4H PRN    albuterol-ipratropium, 3 mL, Nebulization, Q6H PRN    busPIRone, 10 mg, Oral, TID PRN    dextrose 10%, 12.5 g, Intravenous, PRN    dextrose 10%, 25 g, Intravenous, PRN    D5 and 0.45% NaCl, , Intravenous, Continuous PRN    glucagon (human recombinant), 1 mg, Intramuscular, PRN     glucose, 16 g, Oral, PRN    glucose, 24 g, Oral, PRN    insulin aspart U-100, 0-10 Units, Subcutaneous, QID (AC + HS) PRN    ondansetron, 4 mg, Intravenous, Q6H PRN    promethazine (PHENERGAN) 12.5 mg in 0.9% NaCl 50 mL IVPB, 12.5 mg, Intravenous, Q6H PRN    sodium chloride 0.9%, 10 mL, Intravenous, PRN    sodium chloride 0.9%, 10 mL, Intravenous, PRN      CT Abdomen Pelvis  Without Contrast    Result Date: 9/11/2024  EXAMINATION: CT ABDOMEN PELVIS WITHOUT CONTRAST CLINICAL HISTORY: Abdominal pain, acute, nonlocalized; TECHNIQUE: Low dose axial images, sagittal and coronal reformations were obtained from the lung bases to the pubic symphysis.  Oral contrast was not administered. COMPARISON: Multiple priors, most recent 08/28/2024 FINDINGS: Mild cardiomegaly, unchanged.  Small pericardial effusion measuring simple fluid attenuation, decreased compared to prior.  Basilar airspace opacities improved. Please note in the absence of IV contra valuation for solid organ mass is limited. Liver is normal in morphology and with smooth contour.  Gallbladder surgically absent.  No intra or extrahepatic biliary ductal dilatation. Atrophic kidneys.  No hydronephrosis. The spleen adrenal glands and pancreas are normal. Gastric wall thickening and edema.  The abdominal aorta is normal in caliber with advanced calcification for patient's age including of the mesenteric and splenic arteries. No enlarged retroperitoneal lymph nodes. Normal appendix.  No bowel obstruction or inflammatory change.  No mid or free air. Circumferential bladder wall thickening.  Uterus and are unremarkable.  No enlarged pelvic lymph. Lower anterior abdominal subcutaneous edema similar prior, possibly from subcutaneous injections. No acute or aggressive osseous abnormality.     Findings concerning for infectious/inflammatory gastritis. Thick-walled bladder as may be seen in the setting of cystitis.  Correlate with urinalysis. Improved pericardial effusion and  basilar opacities. Electronically signed by: Tayler Squires Date:    09/11/2024 Time:    11:22    X-Ray Chest AP Portable    Result Date: 8/28/2024  EXAMINATION: XR CHEST AP PORTABLE CLINICAL HISTORY: End stage renal disease TECHNIQUE: Single frontal view of the chest was performed. COMPARISON: 08/22/2024. FINDINGS: Stable cardiomegaly. Heart and lungs  appear unchanged when allowing for differences in technique and positioning.     Stable cardiomegaly. No significant change from prior study. Electronically signed by: Sandeep Brower MD Date:    08/28/2024 Time:    21:42    CT Renal Stone Study ABD Pelvis WO    Result Date: 8/28/2024  EXAMINATION: CT RENAL STONE STUDY ABD PELVIS WO CLINICAL HISTORY: Flank pain, kidney stone suspected; TECHNIQUE: Low dose axial images, sagittal and coronal reformations were obtained from the lung bases to the pubic symphysis.  Contrast was not administered. COMPARISON: 08/20/2024. FINDINGS: Heart: Cardiomegaly with moderate pericardial effusion, similar compared to prior. Lung Bases: Increased vascular congestion at the lung bases with central perivascular edema.  No pleural effusions. Liver: Normal in size and attenuation, with no focal hepatic lesions. Gallbladder: Surgically absent. Bile Ducts: No evidence of dilated ducts. Pancreas: Unchanged from prior. Spleen: Unchanged from prior. Adrenals: Unremarkable. Kidneys/ Ureters: Bilateral renal atrophy.  Extensive renal vascular calcifications.  No renal calculi or hydronephrosis. Bladder: Diffuse bladder wall thickening, similar to prior. Reproductive organs: Right adnexal enlargement, nonspecific, similar compared to recent prior.  Extensive vascular calcifications in the adnexa. GI Tract/Mesentery: No evidence of bowel obstruction.  Bowel loops appears similar to prior. Peritoneal Space: No ascites. No free air. Retroperitoneum: Enlarged left periaortic lymph nodes, unchanged from recent prior. Abdominal wall: Anasarca, similar to  prior.  Postsurgical changes abdominal wall, similar to prior. Vasculature: Extensive vascular calcifications present, similar compared to prior study.  No abdominal aortic aneurysm. Bones: No acute fracture.     Cardiomegaly with moderate pericardial effusion, similar to prior.  Interval development of vascular congestion and perivascular edema at the lung bases. Anasarca, similar to prior. Bilateral renal atrophy.  No renal/ureteral calculi or hydroureteronephrosis. Diffuse bladder wall thickening, nonspecific, unchanged from prior. Left periaortic lymph node enlargement, unchanged from recent prior. Right adnexal enlargement, nonspecific, unchanged from recent prior. Extensive vascular calcifications, unchanged from recent prior. Additional findings as above. Electronically signed by: Sandeep Brower MD Date:    08/28/2024 Time:    21:12    CT Abdomen Pelvis  Without Contrast    Result Date: 8/22/2024  EXAMINATION: CT ABDOMEN PELVIS WITHOUT CONTRAST CLINICAL HISTORY: Abdominal pain, acute, nonlocalized; TECHNIQUE: Low dose axial images, sagittal and coronal reformations were obtained from the lung bases to the pubic symphysis without IV contrast.  Oral contrast was not administered. COMPARISON: CT 08/20/2024 FINDINGS: There is mild mosaic alteration of the visualized lung parenchyma.  There are scattered bandlike opacities suggesting atelectasis or scarring.  No significant pleural fluid.  Heart is enlarged.  There is a moderate to large pericardial effusion. Liver is mildly enlarged.  No focal hepatic abnormality on this limited noncontrast study.  The gallbladder is surgically absent.  There is no intra-or extrahepatic biliary ductal dilatation. Stomach appears within normal limits.  Pancreas and adrenal glands are unremarkable for noncontrast technique.  Spleen is mildly enlarged, similar to prior examination. Bilateral native kidneys are atrophic with prominent vascular calcifications.  There is no  significant hydronephrosis.  Urinary bladder demonstrates nonspecific circumferential wall thickening.  Noncontrast appearance of the uterus is unremarkable.  Stable appearance of the bilateral adnexa.  No significant free fluid in the pelvis. The abdominal aorta is normal in course and caliber extensive calcification of the abdominal aorta and visceral branch vessels.  There is no retroperitoneal hematoma. There are mildly prominent fluid-filled loops of small bowel throughout the abdomen and pelvis without discrete transition point which may reflect a nonspecific enteritis.  There is a moderate volume of retained stool throughout the colon suggesting constipation.  No CT evidence of acute appendicitis.  There is no ascites, portal venous gas, or free intraperitoneal air.  There is a small fat containing umbilical hernia.  There are scattered shotty mesenteric lymph nodes.  There are mildly prominent retroperitoneal lymph nodes, similar to prior study. Osseous structures demonstrate no acute fracture.  There is mild diffuse body wall edema.  There is a somewhat more focal region of soft tissue induration within the left paramidline lower abdominal wall possibly a prior injection site or hematoma.  Clinical correlation with physical examination advised.     Atrophic bilateral native kidneys.  No evidence of hydronephrosis. Mild nonspecific urinary bladder wall thickening.  Correlation with urinalysis advised. Moderate to large volume pericardial effusion.  Cardiomegaly. Mildly prominent fluid-filled loops of small bowel without discrete transition point which may reflect a nonspecific enteritis. Mild hepatosplenomegaly. Retained stool throughout the colon suggesting constipation. Additional findings as above. Electronically signed by: Kaushik Diego MD Date:    08/22/2024 Time:    20:35    X-Ray Chest AP Portable    Result Date: 8/22/2024  EXAMINATION: XR CHEST AP PORTABLE CLINICAL HISTORY: Dorsalgia, unspecified  TECHNIQUE: Portable view of the chest was performed. COMPARISON: 08/20/2024. FINDINGS: Lungs are clear.  No focal consolidation.  Heart size is enlarged.  Mediastinal contours unremarkable.  Trachea midline. Bony thorax intact.     Cardiomegaly. Electronically signed by: Varun Trejo Date:    08/22/2024 Time:    06:34    CT Renal Stone Study ABD Pelvis WO    Result Date: 8/20/2024  EXAMINATION: CT RENAL STONE STUDY ABD PELVIS WO CLINICAL HISTORY: Flank pain, kidney stone suspected;left; TECHNIQUE: Low dose axial images, sagittal and coronal reformations were obtained from the lung bases to the pubic symphysis.  Contrast was not administered. COMPARISON: July 16, 2024 FINDINGS: Mild bibasal atelectasis is evident.  There is moderate-sized pericardial effusion.  There is evidence of cardiomegaly and anasarca.  Extensive vascular calcifications are present.  There is evidence of prior cholecystectomy.  There is evidence of renal atrophy bilaterally.  There is an enlarged left periaortic lymph node measuring 13 mm in small stone mentions.  This is nonspecific but is similar to the prior examination.  There is a right adnexal mass which is chronic and may represent a ovarian mass or uterine fibroid.  This area measures 5 x 3.7 cm and is unchanged relative to 2023 a few sigmoid diverticula are seen.  I do not see CT evidence of diverticulitis.  There is no evidence of nephrolithiasis, hydronephrosis or hydroureter.  There is no evidence of bowel obstruction.     No evidence of nephrolithiasis, hydronephrosis or hydroureter. Bladder wall thickening which is nonspecific and evidence of bilateral renal atrophy. Cardiomegaly anasarca and evidence of prior cholecystectomy are again noted. Stable left periaortic enlarged lymph node. Limited evaluation of the colon and parenchyma. Right adnexal mass which is chronic and could represent a uterine fibroid but was not confidently seen on prior ultrasound. Bibasal atelectasis  Electronically signed by: Thanh Davila MD Date:    08/20/2024 Time:    09:03    X-Ray Chest AP Portable    Result Date: 8/20/2024  EXAMINATION: XR CHEST AP PORTABLE CLINICAL HISTORY: Cough, unspecified TECHNIQUE: Portable view of the chest was performed. COMPARISON: 07/18/2024. FINDINGS: Lungs are clear.  No focal consolidation.  Heart size is enlarged.  Mediastinal contours unremarkable.  Trachea midline. Bony thorax intact.     Cardiomegaly. Electronically signed by: Varun Trejo Date:    08/20/2024 Time:    08:46  - pulls last radiology orders      Assessment/Plan:      * High anion gap metabolic acidosis  Patient has high anion gap metabolic acidosis upon admission, beta hydroxybutyrate negative, most likely secondary to ESRD, and also patient taking Diamox at home- unclear etiology.  Patient does not have DKA, no nausea vomiting diarrhea.   Patient has long history of gastroparesis, not candidate for Reglan- patient has significant QTC prolongation .   Start diabetic renal diet.  Resume insulin sliding scale  Resume Lantus 15 units q.h.s. plus 8 units premeal  Discontinue  insulin drip and fluids  Move to medical-surgical  Discontinue Diamox, unclear why she is on this medication      ESRD (end stage renal disease) on dialysis  Last Dialysis on 9/10/24.    -IP consult to nephrology for dialysis orders    Diabetes  Patient's FSGs are uncontrolled due to hyperglycemia on current medication regimen.  Last A1c reviewed-   Lab Results   Component Value Date    HGBA1C 9.0 (H) 09/10/2024     Most recent fingerstick glucose reviewed-   Recent Labs   Lab 09/11/24  0436 09/11/24  0533 09/11/24  0628 09/11/24  0737   POCTGLUCOSE 154* 178* 183* 195*     Current correctional scale  Medium  Maintain anti-hyperglycemic dose as follows-   Antihyperglycemics (From admission, onward)      Start     Stop Route Frequency Ordered    09/11/24 2100  insulin glargine U-100 (Lantus) pen 15 Units         -- SubQ Nightly  09/11/24 1030    09/11/24 1200  insulin aspart U-100 pen 8 Units         -- SubQ 3 times daily with meals 09/11/24 1030    09/11/24 0856  insulin aspart U-100 pen 0-10 Units         -- SubQ Before meals & nightly PRN 09/11/24 0757          Hold Oral hypoglycemics while patient is in the hospital.    QT prolongation      Avoid medications does prolonged QTC    Hypertension  Chronic, uncontrolled. Latest blood pressure and vitals reviewed-     Temp:  [97.9 °F (36.6 °C)-99.3 °F (37.4 °C)]   Pulse:  []   Resp:  [19-22]   BP: (162-224)/()   SpO2:  [97 %-100 %] .   Home meds for hypertension were reviewed and noted below.   Hypertension Medications               hydrALAZINE (APRESOLINE) 50 MG tablet Take 1 tablet (50 mg total) by mouth every 8 (eight) hours.            While in the hospital, will manage blood pressure as follows; Adjust home antihypertensive regimen as follows- Continue home medications and use PRN IV hydralazine     Will utilize p.r.n. blood pressure medication only if patient's blood pressure greater than 180/110 and she develops symptoms such as worsening chest pain or shortness of breath.    Anemia in ESRD (end-stage renal disease)  Anemia is likely due to chronic disease due to ESRD. Most recent hemoglobin and hematocrit are listed below.  Recent Labs     09/08/24  1235 09/10/24  1255 09/10/24  1745   HGB 9.2* 10.3* 9.8*   HCT 27.7* 30.5* 28.2*     Plan  - Monitor serial CBC: Daily  - Transfuse PRBC if patient becomes hemodynamically unstable, symptomatic or H/H drops below 7/21.  - Patient has not received any PRBC transfusions to date  - Patient's anemia is currently stable      Chronic congestive heart failure with left ventricular diastolic dysfunction  Patient is identified as having Diastolic (HFpEF) heart failure that is Chronic. CHF is currently controlled. Latest ECHO performed and demonstrates- Results for orders placed during the hospital encounter of  07/16/24    Echo    Interpretation Summary    Left Ventricle: The left ventricle is normal in size. Normal wall thickness. There is normal systolic function with a visually estimated ejection fraction of 65 - 70%. There is normal diastolic function.    Right Ventricle: Normal right ventricular cavity size. Wall thickness is normal. Systolic function is normal.    Pericardium: There is a small effusion. No indication of cardiac tamponade. Evidence includes no chamber collapse, no respiratory chamber variation.  Will continue home hydralazine and monitor clinical status closely. Monitor on telemetry. Patient is off CHF pathway.  Monitor strict Is&Os and daily weights.  Place on fluid restriction of 1.5 L. Cardiology has not been consulted. Continue to stress to patient importance of self efficacy and  on diet for CHF.         Drug-seeking behavior    Persistent asking for IV Ativan/ narcotics    Abdominal pain    Chronic, unclear etiology, most likely secondary to gastroparesis.   We will get abdominal pelvis without IV contrast    Gastroparesis - suspected, unconfirmed  -IV ativan 1mg Q 4 hours  -IV phenergan 12mg Q 6 hours PRN nausea          VTE Risk Mitigation (From admission, onward)           Ordered     apixaban tablet 5 mg  2 times daily         09/10/24 2205     IP VTE HIGH RISK PATIENT  Once         09/10/24 2050     Place JOCELYNE hose  Until discontinued         09/10/24 2050     Place sequential compression device  Until discontinued         09/10/24 2050     Reason for No Pharmacological VTE Prophylaxis  Once        Question:  Reasons:  Answer:  Already adequately anticoagulated on oral Anticoagulants    09/10/24 2050     Place sequential compression device  Until discontinued         09/10/24 1908                    Discharge Planning   TATIANA: 9/13/2024     Code Status: Full Code   Is the patient medically ready for discharge?:     Reason for patient still in hospital (select all that apply): Patient  trending condition and Treatment  Discharge Plan A: Home with family                  Oneyda Hinds MD  Department of Hospital Medicine   [facility]/Surg

## 2024-09-12 LAB
ANION GAP SERPL CALC-SCNC: 13 MMOL/L (ref 8–16)
BUN SERPL-MCNC: 25 MG/DL (ref 6–20)
CALCIUM SERPL-MCNC: 8.7 MG/DL (ref 8.7–10.5)
CHLORIDE SERPL-SCNC: 101 MMOL/L (ref 95–110)
CO2 SERPL-SCNC: 21 MMOL/L (ref 23–29)
CREAT SERPL-MCNC: 7.5 MG/DL (ref 0.5–1.4)
EST. GFR  (NO RACE VARIABLE): 7 ML/MIN/1.73 M^2
GLUCOSE SERPL-MCNC: 371 MG/DL (ref 70–110)
GLUCOSE SERPL-MCNC: 61 MG/DL (ref 70–110)
GLUCOSE SERPL-MCNC: 61 MG/DL (ref 70–110)
GLUCOSE SERPL-MCNC: 79 MG/DL (ref 70–110)
MAGNESIUM SERPL-MCNC: 2.1 MG/DL (ref 1.6–2.6)
OHS QRS DURATION: 86 MS
OHS QTC CALCULATION: 492 MS
PHOSPHATE SERPL-MCNC: 4.9 MG/DL (ref 2.7–4.5)
POCT GLUCOSE: 102 MG/DL (ref 70–110)
POCT GLUCOSE: 155 MG/DL (ref 70–110)
POCT GLUCOSE: 172 MG/DL (ref 70–110)
POCT GLUCOSE: 183 MG/DL (ref 70–110)
POCT GLUCOSE: 204 MG/DL (ref 70–110)
POCT GLUCOSE: 39 MG/DL (ref 70–110)
POCT GLUCOSE: 396 MG/DL (ref 70–110)
POCT GLUCOSE: 423 MG/DL (ref 70–110)
POCT GLUCOSE: 53 MG/DL (ref 70–110)
POCT GLUCOSE: 71 MG/DL (ref 70–110)
POCT GLUCOSE: 91 MG/DL (ref 70–110)
POCT GLUCOSE: 95 MG/DL (ref 70–110)
POTASSIUM SERPL-SCNC: 4.5 MMOL/L (ref 3.5–5.1)
SODIUM SERPL-SCNC: 135 MMOL/L (ref 136–145)

## 2024-09-12 PROCEDURE — 80048 BASIC METABOLIC PNL TOTAL CA: CPT | Performed by: HOSPITALIST

## 2024-09-12 PROCEDURE — 82947 ASSAY GLUCOSE BLOOD QUANT: CPT | Mod: 91 | Performed by: HOSPITALIST

## 2024-09-12 PROCEDURE — 63600175 PHARM REV CODE 636 W HCPCS

## 2024-09-12 PROCEDURE — G0378 HOSPITAL OBSERVATION PER HR: HCPCS

## 2024-09-12 PROCEDURE — 84100 ASSAY OF PHOSPHORUS: CPT

## 2024-09-12 PROCEDURE — 99222 1ST HOSP IP/OBS MODERATE 55: CPT | Mod: ,,, | Performed by: INTERNAL MEDICINE

## 2024-09-12 PROCEDURE — 25000003 PHARM REV CODE 250: Performed by: STUDENT IN AN ORGANIZED HEALTH CARE EDUCATION/TRAINING PROGRAM

## 2024-09-12 PROCEDURE — 63600175 PHARM REV CODE 636 W HCPCS: Performed by: HOSPITALIST

## 2024-09-12 PROCEDURE — 63600175 PHARM REV CODE 636 W HCPCS: Performed by: NURSE PRACTITIONER

## 2024-09-12 PROCEDURE — 36415 COLL VENOUS BLD VENIPUNCTURE: CPT

## 2024-09-12 PROCEDURE — 11000001 HC ACUTE MED/SURG PRIVATE ROOM

## 2024-09-12 PROCEDURE — 25000003 PHARM REV CODE 250: Performed by: HOSPITALIST

## 2024-09-12 PROCEDURE — 99900031 HC PATIENT EDUCATION (STAT)

## 2024-09-12 PROCEDURE — 99900035 HC TECH TIME PER 15 MIN (STAT)

## 2024-09-12 PROCEDURE — 63600175 PHARM REV CODE 636 W HCPCS: Mod: JZ,JG | Performed by: INTERNAL MEDICINE

## 2024-09-12 PROCEDURE — 90935 HEMODIALYSIS ONE EVALUATION: CPT

## 2024-09-12 PROCEDURE — 94761 N-INVAS EAR/PLS OXIMETRY MLT: CPT

## 2024-09-12 PROCEDURE — 96372 THER/PROPH/DIAG INJ SC/IM: CPT | Performed by: HOSPITALIST

## 2024-09-12 PROCEDURE — 36415 COLL VENOUS BLD VENIPUNCTURE: CPT | Performed by: HOSPITALIST

## 2024-09-12 PROCEDURE — 83735 ASSAY OF MAGNESIUM: CPT

## 2024-09-12 RX ORDER — INSULIN GLARGINE 100 [IU]/ML
25 INJECTION, SOLUTION SUBCUTANEOUS DAILY
Status: DISCONTINUED | OUTPATIENT
Start: 2024-09-12 | End: 2024-09-13

## 2024-09-12 RX ORDER — INSULIN ASPART 100 [IU]/ML
10 INJECTION, SOLUTION INTRAVENOUS; SUBCUTANEOUS
Status: DISCONTINUED | OUTPATIENT
Start: 2024-09-12 | End: 2024-09-13

## 2024-09-12 RX ORDER — DIPHENHYDRAMINE HCL 25 MG
25 CAPSULE ORAL EVERY 6 HOURS PRN
Status: DISCONTINUED | OUTPATIENT
Start: 2024-09-12 | End: 2024-09-14 | Stop reason: HOSPADM

## 2024-09-12 RX ORDER — HYDROMORPHONE HYDROCHLORIDE 1 MG/ML
1 INJECTION, SOLUTION INTRAMUSCULAR; INTRAVENOUS; SUBCUTANEOUS EVERY 6 HOURS PRN
Status: COMPLETED | OUTPATIENT
Start: 2024-09-12 | End: 2024-09-13

## 2024-09-12 RX ADMIN — SUCRALFATE 1 G: 1 TABLET ORAL at 09:09

## 2024-09-12 RX ADMIN — SEVELAMER CARBONATE 800 MG: 800 TABLET, FILM COATED ORAL at 05:09

## 2024-09-12 RX ADMIN — ATROPINE SULFATE 1 DROP: 10 SOLUTION OPHTHALMIC at 09:09

## 2024-09-12 RX ADMIN — BRIMONIDINE TARTRATE 1 DROP: 1.5 SOLUTION OPHTHALMIC at 04:09

## 2024-09-12 RX ADMIN — BRIMONIDINE TARTRATE 1 DROP: 1.5 SOLUTION OPHTHALMIC at 09:09

## 2024-09-12 RX ADMIN — HYDROMORPHONE HYDROCHLORIDE 1 MG: 1 INJECTION, SOLUTION INTRAMUSCULAR; INTRAVENOUS; SUBCUTANEOUS at 07:09

## 2024-09-12 RX ADMIN — HYDROMORPHONE HYDROCHLORIDE 1 MG: 1 INJECTION, SOLUTION INTRAMUSCULAR; INTRAVENOUS; SUBCUTANEOUS at 01:09

## 2024-09-12 RX ADMIN — LEVETIRACETAM 500 MG: 250 TABLET, FILM COATED ORAL at 09:09

## 2024-09-12 RX ADMIN — INSULIN GLARGINE 25 UNITS: 100 INJECTION, SOLUTION SUBCUTANEOUS at 07:09

## 2024-09-12 RX ADMIN — FLUOXETINE HYDROCHLORIDE 40 MG: 20 CAPSULE ORAL at 09:09

## 2024-09-12 RX ADMIN — EPOETIN ALFA-EPBX 5280 UNITS: 4000 INJECTION, SOLUTION INTRAVENOUS; SUBCUTANEOUS at 02:09

## 2024-09-12 RX ADMIN — HYDROMORPHONE HYDROCHLORIDE 1 MG: 1 INJECTION, SOLUTION INTRAMUSCULAR; INTRAVENOUS; SUBCUTANEOUS at 11:09

## 2024-09-12 RX ADMIN — TIMOLOL MALEATE 1 DROP: 5 SOLUTION/ DROPS OPHTHALMIC at 09:09

## 2024-09-12 RX ADMIN — SUCRALFATE 1 G: 1 TABLET ORAL at 05:09

## 2024-09-12 RX ADMIN — Medication 16 G: at 05:09

## 2024-09-12 RX ADMIN — PANTOPRAZOLE SODIUM 40 MG: 40 INJECTION, POWDER, LYOPHILIZED, FOR SOLUTION INTRAVENOUS at 09:09

## 2024-09-12 RX ADMIN — DIPHENHYDRAMINE HYDROCHLORIDE 25 MG: 25 CAPSULE ORAL at 04:09

## 2024-09-12 RX ADMIN — DORZOLAMIDE HYDROCHLORIDE TIMOLOL MALEATE 1 DROP: 20; 5 SOLUTION/ DROPS OPHTHALMIC at 09:09

## 2024-09-12 RX ADMIN — DEXTROSE MONOHYDRATE 250 ML: 100 INJECTION, SOLUTION INTRAVENOUS at 05:09

## 2024-09-12 RX ADMIN — ONDANSETRON 4 MG: 2 INJECTION INTRAMUSCULAR; INTRAVENOUS at 07:09

## 2024-09-12 RX ADMIN — HYDROMORPHONE HYDROCHLORIDE 1 MG: 1 INJECTION, SOLUTION INTRAMUSCULAR; INTRAVENOUS; SUBCUTANEOUS at 05:09

## 2024-09-12 NOTE — ASSESSMENT & PLAN NOTE
Patient has high anion gap metabolic acidosis upon admission, beta hydroxybutyrate negative, most likely secondary to ESRD, and also patient taking Diamox at home- unclear etiology.  Patient does not have DKA, no nausea vomiting diarrhea.   Patient has long history of gastroparesis, not candidate for Reglan- patient has significant QTC prolongation .   Start diabetic renal diet.  Resume insulin sliding scale  Discontinue Diamox, unclear why she is on this medication.    Increase Lantus 25 units in the a.m., lispro to 10 units t.i.d. pre meals

## 2024-09-12 NOTE — ASSESSMENT & PLAN NOTE
Chronic, unclear etiology, most likely secondary to gastroparesis?.     CT reviewed shows questionable inflammatory gastritis.  We will ask GI evaluation  Possible need EGD, keep patient is NPO now

## 2024-09-12 NOTE — SUBJECTIVE & OBJECTIVE
Interval History:   Seen and examined at multidisciplinary rounds, patient is awake alert, AAO times, complaining of severe left flank pain, asking for IV Dilaudid.  CT reviewed with the patient.  Patient really can not tell me why she is taking acetazolamide.     Review of Systems   Gastrointestinal:  Positive for abdominal pain.     Objective:     Vital Signs (Most Recent):  Temp: 98.2 °F (36.8 °C) (09/12/24 0733)  Pulse: 87 (09/12/24 0825)  Resp: 17 (09/12/24 0746)  BP: 132/80 (09/12/24 0825)  SpO2: 100 % (09/12/24 0733) Vital Signs (24h Range):  Temp:  [98 °F (36.7 °C)-98.4 °F (36.9 °C)] 98.2 °F (36.8 °C)  Pulse:  [82-93] 87  Resp:  [12-22] 17  SpO2:  [95 %-100 %] 100 %  BP: (132-224)/() 132/80     Weight: 52.7 kg (116 lb 2.9 oz)  Body mass index is 21.25 kg/m².    Intake/Output Summary (Last 24 hours) at 9/12/2024 1017  Last data filed at 9/12/2024 0513  Gross per 24 hour   Intake 840 ml   Output 2500 ml   Net -1660 ml         Physical Exam  Constitutional:       Appearance: She is ill-appearing.   HENT:      Head: Normocephalic and atraumatic.      Nose: Nose normal.   Eyes:      Extraocular Movements: Extraocular movements intact.      Pupils: Pupils are equal, round, and reactive to light.   Cardiovascular:      Rate and Rhythm: Normal rate and regular rhythm.      Heart sounds: No murmur heard.  Pulmonary:      Effort: Pulmonary effort is normal.      Breath sounds: Normal breath sounds.   Abdominal:      General: Abdomen is flat.      Palpations: Abdomen is soft.      Tenderness: There is abdominal tenderness.   Musculoskeletal:         General: Normal range of motion.      Cervical back: Normal range of motion and neck supple.   Skin:     General: Skin is warm and dry.   Neurological:      General: No focal deficit present.      Mental Status: She is alert.      Comments: Lethargic   Psychiatric:         Mood and Affect: Mood normal.             Significant Labs: All pertinent labs within the past  24 hours have been reviewed.  CBC:   Recent Labs   Lab 09/10/24  1255 09/10/24  1745 09/11/24  0420   WBC 8.57 10.24 6.47   HGB 10.3* 9.8* 8.7*   HCT 30.5* 28.2* 26.3*    245 200     CMP:   Recent Labs   Lab 09/10/24  1255 09/10/24  1745 09/10/24  2106 09/11/24  0031 09/11/24  0420 09/12/24  0436    137   < > 140 138 135*   K 4.1 4.1   < > 3.9 3.8 4.5   CL 93* 96   < > 100 98 101   CO2 22* 19*   < > 25 24 21*   * 217*   < > 80 173* 371*   BUN 30* 32*   < > 33* 35* 25*   CREATININE 7.6* 8.5*   < > 8.7* 8.8* 7.5*   CALCIUM 9.5 9.3   < > 8.7 8.6* 8.7   PROT 7.5 7.4  --   --   --   --    ALBUMIN 4.1 3.4*  --   --   --   --    BILITOT 1.0 1.1*  --   --   --   --    ALKPHOS 99 99  --   --   --   --    AST 18 19  --   --   --   --    ALT 14 13  --   --   --   --    ANIONGAP 21* 22*   < > 15 16 13    < > = values in this interval not displayed.       Significant Imaging: I have reviewed all pertinent imaging results/findings within the past 24 hours.  I have reviewed and interpreted all pertinent imaging results/findings within the past 24 hours.    Scheduled Meds:   atropine 1%  1 drop Left Eye BID    brimonidine 0.15 % OPTH DROP  1 drop Both Eyes TID    dorzolamide-timolol 2-0.5%  1 drop Left Eye Q12H    epoetin teresa-epbx  100 Units/kg Intravenous Every Tues, Thurs, Sat    FLUoxetine  40 mg Oral Daily    insulin aspart U-100  10 Units Subcutaneous TID WM    insulin glargine U-100  25 Units Subcutaneous Daily    levETIRAcetam  500 mg Oral BID    pantoprazole  40 mg Intravenous Daily    sevelamer carbonate  800 mg Oral TID WM    sucralfate  1 g Oral QID    timolol maleate 0.5%  1 drop Left Eye BID     Continuous Infusions:   D5 and 0.45% NaCl   Intravenous Continuous PRN   Stopped at 09/11/24 0845     PRN Meds:.  Current Facility-Administered Medications:     acetaminophen, 650 mg, Oral, Q4H PRN    albuterol-ipratropium, 3 mL, Nebulization, Q6H PRN    busPIRone, 10 mg, Oral, TID PRN    dextrose 10%, 12.5  g, Intravenous, PRN    dextrose 10%, 25 g, Intravenous, PRN    D5 and 0.45% NaCl, , Intravenous, Continuous PRN    glucagon (human recombinant), 1 mg, Intramuscular, PRN    glucose, 16 g, Oral, PRN    glucose, 24 g, Oral, PRN    insulin aspart U-100, 0-10 Units, Subcutaneous, QID (AC + HS) PRN    ondansetron, 4 mg, Intravenous, Q6H PRN    promethazine (PHENERGAN) 12.5 mg in 0.9% NaCl 50 mL IVPB, 12.5 mg, Intravenous, Q6H PRN    sodium chloride 0.9%, 10 mL, Intravenous, PRN    sodium chloride 0.9%, 10 mL, Intravenous, PRN      CT Abdomen Pelvis  Without Contrast    Result Date: 9/11/2024  EXAMINATION: CT ABDOMEN PELVIS WITHOUT CONTRAST CLINICAL HISTORY: Abdominal pain, acute, nonlocalized; TECHNIQUE: Low dose axial images, sagittal and coronal reformations were obtained from the lung bases to the pubic symphysis.  Oral contrast was not administered. COMPARISON: Multiple priors, most recent 08/28/2024 FINDINGS: Mild cardiomegaly, unchanged.  Small pericardial effusion measuring simple fluid attenuation, decreased compared to prior.  Basilar airspace opacities improved. Please note in the absence of IV contra valuation for solid organ mass is limited. Liver is normal in morphology and with smooth contour.  Gallbladder surgically absent.  No intra or extrahepatic biliary ductal dilatation. Atrophic kidneys.  No hydronephrosis. The spleen adrenal glands and pancreas are normal. Gastric wall thickening and edema.  The abdominal aorta is normal in caliber with advanced calcification for patient's age including of the mesenteric and splenic arteries. No enlarged retroperitoneal lymph nodes. Normal appendix.  No bowel obstruction or inflammatory change.  No mid or free air. Circumferential bladder wall thickening.  Uterus and are unremarkable.  No enlarged pelvic lymph. Lower anterior abdominal subcutaneous edema similar prior, possibly from subcutaneous injections. No acute or aggressive osseous abnormality.     Findings  concerning for infectious/inflammatory gastritis. Thick-walled bladder as may be seen in the setting of cystitis.  Correlate with urinalysis. Improved pericardial effusion and basilar opacities. Electronically signed by: Tayler Squires Date:    09/11/2024 Time:    11:22    X-Ray Chest AP Portable    Result Date: 8/28/2024  EXAMINATION: XR CHEST AP PORTABLE CLINICAL HISTORY: End stage renal disease TECHNIQUE: Single frontal view of the chest was performed. COMPARISON: 08/22/2024. FINDINGS: Stable cardiomegaly. Heart and lungs  appear unchanged when allowing for differences in technique and positioning.     Stable cardiomegaly. No significant change from prior study. Electronically signed by: Sandeep Brower MD Date:    08/28/2024 Time:    21:42    CT Renal Stone Study ABD Pelvis WO    Result Date: 8/28/2024  EXAMINATION: CT RENAL STONE STUDY ABD PELVIS WO CLINICAL HISTORY: Flank pain, kidney stone suspected; TECHNIQUE: Low dose axial images, sagittal and coronal reformations were obtained from the lung bases to the pubic symphysis.  Contrast was not administered. COMPARISON: 08/20/2024. FINDINGS: Heart: Cardiomegaly with moderate pericardial effusion, similar compared to prior. Lung Bases: Increased vascular congestion at the lung bases with central perivascular edema.  No pleural effusions. Liver: Normal in size and attenuation, with no focal hepatic lesions. Gallbladder: Surgically absent. Bile Ducts: No evidence of dilated ducts. Pancreas: Unchanged from prior. Spleen: Unchanged from prior. Adrenals: Unremarkable. Kidneys/ Ureters: Bilateral renal atrophy.  Extensive renal vascular calcifications.  No renal calculi or hydronephrosis. Bladder: Diffuse bladder wall thickening, similar to prior. Reproductive organs: Right adnexal enlargement, nonspecific, similar compared to recent prior.  Extensive vascular calcifications in the adnexa. GI Tract/Mesentery: No evidence of bowel obstruction.  Bowel loops appears similar  to prior. Peritoneal Space: No ascites. No free air. Retroperitoneum: Enlarged left periaortic lymph nodes, unchanged from recent prior. Abdominal wall: Anasarca, similar to prior.  Postsurgical changes abdominal wall, similar to prior. Vasculature: Extensive vascular calcifications present, similar compared to prior study.  No abdominal aortic aneurysm. Bones: No acute fracture.     Cardiomegaly with moderate pericardial effusion, similar to prior.  Interval development of vascular congestion and perivascular edema at the lung bases. Anasarca, similar to prior. Bilateral renal atrophy.  No renal/ureteral calculi or hydroureteronephrosis. Diffuse bladder wall thickening, nonspecific, unchanged from prior. Left periaortic lymph node enlargement, unchanged from recent prior. Right adnexal enlargement, nonspecific, unchanged from recent prior. Extensive vascular calcifications, unchanged from recent prior. Additional findings as above. Electronically signed by: Sandeep Brower MD Date:    08/28/2024 Time:    21:12    CT Abdomen Pelvis  Without Contrast    Result Date: 8/22/2024  EXAMINATION: CT ABDOMEN PELVIS WITHOUT CONTRAST CLINICAL HISTORY: Abdominal pain, acute, nonlocalized; TECHNIQUE: Low dose axial images, sagittal and coronal reformations were obtained from the lung bases to the pubic symphysis without IV contrast.  Oral contrast was not administered. COMPARISON: CT 08/20/2024 FINDINGS: There is mild mosaic alteration of the visualized lung parenchyma.  There are scattered bandlike opacities suggesting atelectasis or scarring.  No significant pleural fluid.  Heart is enlarged.  There is a moderate to large pericardial effusion. Liver is mildly enlarged.  No focal hepatic abnormality on this limited noncontrast study.  The gallbladder is surgically absent.  There is no intra-or extrahepatic biliary ductal dilatation. Stomach appears within normal limits.  Pancreas and adrenal glands are unremarkable for  noncontrast technique.  Spleen is mildly enlarged, similar to prior examination. Bilateral native kidneys are atrophic with prominent vascular calcifications.  There is no significant hydronephrosis.  Urinary bladder demonstrates nonspecific circumferential wall thickening.  Noncontrast appearance of the uterus is unremarkable.  Stable appearance of the bilateral adnexa.  No significant free fluid in the pelvis. The abdominal aorta is normal in course and caliber extensive calcification of the abdominal aorta and visceral branch vessels.  There is no retroperitoneal hematoma. There are mildly prominent fluid-filled loops of small bowel throughout the abdomen and pelvis without discrete transition point which may reflect a nonspecific enteritis.  There is a moderate volume of retained stool throughout the colon suggesting constipation.  No CT evidence of acute appendicitis.  There is no ascites, portal venous gas, or free intraperitoneal air.  There is a small fat containing umbilical hernia.  There are scattered shotty mesenteric lymph nodes.  There are mildly prominent retroperitoneal lymph nodes, similar to prior study. Osseous structures demonstrate no acute fracture.  There is mild diffuse body wall edema.  There is a somewhat more focal region of soft tissue induration within the left paramidline lower abdominal wall possibly a prior injection site or hematoma.  Clinical correlation with physical examination advised.     Atrophic bilateral native kidneys.  No evidence of hydronephrosis. Mild nonspecific urinary bladder wall thickening.  Correlation with urinalysis advised. Moderate to large volume pericardial effusion.  Cardiomegaly. Mildly prominent fluid-filled loops of small bowel without discrete transition point which may reflect a nonspecific enteritis. Mild hepatosplenomegaly. Retained stool throughout the colon suggesting constipation. Additional findings as above. Electronically signed by: Kaushik  MD Johnathon Date:    08/22/2024 Time:    20:35    X-Ray Chest AP Portable    Result Date: 8/22/2024  EXAMINATION: XR CHEST AP PORTABLE CLINICAL HISTORY: Dorsalgia, unspecified TECHNIQUE: Portable view of the chest was performed. COMPARISON: 08/20/2024. FINDINGS: Lungs are clear.  No focal consolidation.  Heart size is enlarged.  Mediastinal contours unremarkable.  Trachea midline. Bony thorax intact.     Cardiomegaly. Electronically signed by: Varun Trejo Date:    08/22/2024 Time:    06:34    CT Renal Stone Study ABD Pelvis WO    Result Date: 8/20/2024  EXAMINATION: CT RENAL STONE STUDY ABD PELVIS WO CLINICAL HISTORY: Flank pain, kidney stone suspected;left; TECHNIQUE: Low dose axial images, sagittal and coronal reformations were obtained from the lung bases to the pubic symphysis.  Contrast was not administered. COMPARISON: July 16, 2024 FINDINGS: Mild bibasal atelectasis is evident.  There is moderate-sized pericardial effusion.  There is evidence of cardiomegaly and anasarca.  Extensive vascular calcifications are present.  There is evidence of prior cholecystectomy.  There is evidence of renal atrophy bilaterally.  There is an enlarged left periaortic lymph node measuring 13 mm in small stone mentions.  This is nonspecific but is similar to the prior examination.  There is a right adnexal mass which is chronic and may represent a ovarian mass or uterine fibroid.  This area measures 5 x 3.7 cm and is unchanged relative to 2023 a few sigmoid diverticula are seen.  I do not see CT evidence of diverticulitis.  There is no evidence of nephrolithiasis, hydronephrosis or hydroureter.  There is no evidence of bowel obstruction.     No evidence of nephrolithiasis, hydronephrosis or hydroureter. Bladder wall thickening which is nonspecific and evidence of bilateral renal atrophy. Cardiomegaly anasarca and evidence of prior cholecystectomy are again noted. Stable left periaortic enlarged lymph node. Limited evaluation of  the colon and parenchyma. Right adnexal mass which is chronic and could represent a uterine fibroid but was not confidently seen on prior ultrasound. Bibasal atelectasis Electronically signed by: Thanh Davila MD Date:    08/20/2024 Time:    09:03    X-Ray Chest AP Portable    Result Date: 8/20/2024  EXAMINATION: XR CHEST AP PORTABLE CLINICAL HISTORY: Cough, unspecified TECHNIQUE: Portable view of the chest was performed. COMPARISON: 07/18/2024. FINDINGS: Lungs are clear.  No focal consolidation.  Heart size is enlarged.  Mediastinal contours unremarkable.  Trachea midline. Bony thorax intact.     Cardiomegaly. Electronically signed by: Varun Trejo Date:    08/20/2024 Time:    08:46  - pulls last radiology orders

## 2024-09-12 NOTE — PLAN OF CARE
Problem: Diabetic Ketoacidosis  Goal: Optimal Coping  Outcome: Progressing  Goal: Fluid and Electrolyte Balance with Absence of Ketosis  Outcome: Progressing     Problem: Adult Inpatient Plan of Care  Goal: Plan of Care Review  Outcome: Progressing  Goal: Patient-Specific Goal (Individualized)  Outcome: Progressing  Goal: Optimal Comfort and Wellbeing  Outcome: Progressing     Problem: Diabetes Comorbidity  Goal: Blood Glucose Level Within Targeted Range  Outcome: Progressing     Problem: Hemodialysis  Goal: Effective Tissue Perfusion  Outcome: Progressing  Goal: Absence of Infection Signs and Symptoms  Outcome: Progressing

## 2024-09-12 NOTE — PROGRESS NOTES
HD tx tolerated well    Net UF 3.5 L    Pt educated on low potassium food and beverages       09/12/24 1645   Handoff Report   Received From Stephanie Mena   Given To Alice/Herlinda   Vital Signs   Temp 98.2 °F (36.8 °C)   Temp Source Oral   Pulse 79   Resp 18   SpO2 98 %   Pulse Oximetry Type Intermittent   Probe Placed On (Pulse Ox) finger   BP (!) 146/92   BP Location Right arm   BP Method Automatic   Patient Position Lying   Assessments (Pre/Post)   Blood Liters Processed (BLP) 66   Transport Modality not applicable   Level of Consciousness (AVPU) alert   Dialyzer Clearance moderately streaked        Hemodialysis AV Fistula Left upper arm   No placement date or time found.   Location: Left upper arm   Site Assessment Clean;Dry;Intact   Patency Present;Thrill;Bruit   Status Deaccessed   Flows Good   Dressing Intervention First dressing   Dressing Status Clean;Dry;Intact   Site Condition No complications   Dressing Gauze   Post-Hemodialysis Assessment   Rinseback Volume (mL) 250 mL   Blood Volume Processed (Liters) 66 L   Dialyzer Clearance Moderately streaked   Duration of Treatment 180 minutes   Additional Fluid Intake (mL) 500 mL   Total UF (mL) 4000 mL   Net Fluid Removal 3500   Patient Response to Treatment Tolerated well   Post-Treatment Weight 51.7 kg (113 lb 15.7 oz)   Treatment Weight Change -3.5   Arterial bleeding stop time (min) 7 min   Venous bleeding stop time (min) 5 min   Post-Hemodialysis Comments Tx complete, pt stable

## 2024-09-12 NOTE — CONSULTS
Food & Nutrition  Education    Diet Education: Renal Diabetic   Time Spent: 30 minutes  Learners: patient      Nutrition Education provided with handouts: Renal and diabetes meal planning and  preparation.  Discussed proper time of taking renvela; reviewed insulin administration and importance of testing blood glucose.      Latest Reference Range & Units Most Recent   Hemoglobin A1C External 4.5 - 6.2 % 9.0 (H)  9/10/24 21:06   (H): Data is abnormally high    Comments: Pt has a Hx of non-compliance.  She stated she is curretnly living with family in [place] so they can help take care of her but she goes to [facility] in [place] 3 x week on TTS.      All questions and concerns answered. Dietitian's contact information provided.       Follow-Up: prn    Please Re-consult as needed        Thanks!

## 2024-09-12 NOTE — CARE UPDATE
09/12/24 0653   Patient Assessment/Suction   Level of Consciousness (AVPU) alert   Respiratory Effort Normal;Unlabored   Expansion/Accessory Muscles/Retractions no use of accessory muscles;no retractions;expansion symmetric   All Lung Fields Breath Sounds Anterior:;Lateral:;clear   Rhythm/Pattern, Respiratory unlabored;pattern regular;depth regular;no shortness of breath reported   Cough Frequency no cough   PRE-TX-O2   Device (Oxygen Therapy) room air   SpO2 95 %   Pulse Oximetry Type Intermittent   $ Pulse Oximetry - Multiple Charge Pulse Oximetry - Multiple   Pulse 85   Resp 14   Positioning HOB elevated 30 degrees   Aerosol Therapy   $ Aerosol Therapy Charges PRN treatment not required   Education   $ Education Bronchodilator;15 min

## 2024-09-12 NOTE — CONSULTS
[facility]     CC: N/V, Abnormal CT scan    HPI 34 y.o. female with a previous medical history of ESRD on dialysis Tue/Thur/Sat, chronic abdominal pain, HTN, Type II Diabetes, who presented to the ED  for left-sided flank pain. HPI is limited to chart review and ED note due to patient somnolence.  Per chart review, patient has multiple similar episodes in the past and has had multiple previous CT scans performed.  Also has had multiple hospitalizations for DKA in the past.  Patient had dialysis earlier today, and following this, she had bilateral flank pain.  Left was worse than right.  She states that she has been taking her long-acting insulin.  She initially presented to [facility].  She had some lab work performed there which did show an anion gap with hyperglycemia that was elevated.  At the outside facility, she repeatedly requested opioid analgesia which was not indicated at that time.  Patient then stated that she would leave and go to another hospital.  [facility] and came to [facility].  Upon arrival, complaining of bilateral with left-greater-than-right flank pain.  States that she has been using her insulin although she has a long history of insulin noncompliance. Patient administered 2mg of ativan and 12.5 of IV phenergan in ED with alleviation of her pain symptoms. She was initiated on an insulin drip for DKA and admitted by hospital medicine for further evaluation and management.      Overview/Hospital Course:  No notes on file     Interval History:   Seen and examined at multidisciplinary rounds, patient is awake alert, AAO times, complaining of severe left flank pain, asking for IV Dilaudid.  CT reviewed with the patient.  Patient really can not tell me why she is taking acetazolamide.     FURTHER HISTORY:  Above obtained from independent review of records from admitting provider as well as from direct discussion with nursing who states patient still with abdominal  pain.  In addition, on my interview, I note the following:  Patient complains of abdominal pain, onset a few days ago, epigastric/generalized, associated with N/V, ongoing, with no alleviating/exacerbating factors.  She had CT which was independently reviewed and showed gastric thickening and edema.  No history of EGD.  She has DM and has had multiple past admissions for DKA.  No bleeding.  She is asking for diet advancement today.      Past Medical History:   Diagnosis Date    ESRD on hemodialysis 2022    Gastritis 2022    EGD was 22    Gastroparesis 2022    has not had Emptying study    Heart failure with preserved ejection fraction 2022    EF 55% on 3/22    History of supraventricular tachycardia     Hyperkalemia 2022    Hypertensive emergency 2022    Sickle cell trait 2022    Type 2 diabetes mellitus        Past Surgical History:   Procedure Laterality Date     SECTION      x 3    COLONOSCOPY      COLONOSCOPY N/A 2022    Procedure: COLONOSCOPY;  Surgeon: Jagdeep Cedeno MD;  Location: [facility];  Service: Endoscopy;  Laterality: N/A;    DESTRUCTION, CILIARY BODY, USING LASER Left 2024    Procedure: Cyclophotocoagulation G-probe, retrobulbar chlorpromazine left eye;  Surgeon: Fidel Wise MD;  Location: [address];  Service: Ophthalmology;  Laterality: Left;    ENDOSCOPIC ULTRASOUND OF UPPER GASTROINTESTINAL TRACT N/A 2023    Procedure: ULTRASOUND, UPPER GI TRACT, ENDOSCOPIC;  Surgeon: Micky Paredes III, MD;  Location: [facility];  Service: Endoscopy;  Laterality: N/A;    ESOPHAGOGASTRODUODENOSCOPY N/A 10/18/2019    Procedure: ESOPHAGOGASTRODUODENOSCOPY (EGD);  Surgeon: Gianluca Mendez MD;  Location: [facility];  Service: Endoscopy;  Laterality: N/A;    ESOPHAGOGASTRODUODENOSCOPY N/A 2022    Procedure: EGD (ESOPHAGOGASTRODUODENOSCOPY);  Surgeon: Micky Paredes III, MD;  Location: [facility];  Service: Endoscopy;   Laterality: N/A;    ESOPHAGOGASTRODUODENOSCOPY N/A 12/05/2022    Procedure: EGD (ESOPHAGOGASTRODUODENOSCOPY);  Surgeon: Marcelo Zhong MD;  Location: [facility];  Service: Endoscopy;  Laterality: N/A;    EYE SURGERY      INSERTION, CATHETER, TUNNELED N/A 06/17/2023    Procedure: Insertion,catheter,tunneled;  Surgeon: Carlos Thurman Jr., MD;  Location: [facility] OR;  Service: General;  Laterality: N/A;    LAPAROSCOPIC CHOLECYSTECTOMY N/A 07/30/2022    Procedure: CHOLECYSTECTOMY, LAPAROSCOPIC;  Surgeon: Grey Perez MD;  Location: [facility] OR;  Service: General;  Laterality: N/A;    PLACEMENT OF DUAL-LUMEN VASCULAR CATHETER Left 07/12/2022    Procedure: INSERTION-CATHETER-JOSEPH;  Surgeon: Dionte Gan MD;  Location: [facility] OR;  Service: General;  Laterality: Left;    PLACEMENT OF DUAL-LUMEN VASCULAR CATHETER Right 07/26/2022    Procedure: INSERTION-CATHETER-Hemosplit;  Surgeon: Dionte Gan MD;  Location: [facility] OR;  Service: General;  Laterality: Right;       Social History     Tobacco Use    Smoking status: Never    Smokeless tobacco: Never   Substance Use Topics    Alcohol use: Not Currently    Drug use: No       Family History   Problem Relation Name Age of Onset    Diabetes Mother      Diabetes Father         Review of Systems  General ROS: negative for - chills, fever or weight loss  Psychological ROS: negative for - hallucination, depression or suicidal ideation  Ophthalmic ROS: negative for - blurry vision, photophobia or eye pain  ENT ROS: negative for - epistaxis, sore throat or rhinorrhea  Respiratory ROS: no cough, shortness of breath, or wheezing  Cardiovascular ROS: no chest pain or dyspnea on exertion  Gastrointestinal ROS: as above  Genito-Urinary ROS: no dysuria, trouble voiding, or hematuria  Musculoskeletal ROS: negative for - arthralgia, myalgia, weakness  Neurological ROS: no syncope or seizures; no ataxia  Dermatological ROS: negative for pruritis, rash and jaundice    Physical  "Examination  /74   Pulse 79   Temp 98.1 °F (36.7 °C)   Resp 17   Ht 5' 2" (1.575 m)   Wt 52.7 kg (116 lb 2.9 oz)   LMP  (LMP Unknown) Comment: pt states last mentral cycle was 3yrs ago  SpO2 (!) 91%   Breastfeeding No   BMI 21.25 kg/m²   General appearance: alert, cooperative, no distress  HENT: Normocephalic, atraumatic, neck symmetrical, no nasal discharge   Eyes: conjunctivae/corneas clear, PERRL, EOM's intact, sclera anicteric  Lungs: clear to auscultation bilaterally, no dullness to percussion bilaterally, symmetric expansion, breathing unlabored  Heart: regular rate and rhythm without rub; no displacement of the PMI   Abdomen: soft, TTP diffusely  Extremities: extremities symmetric; no clubbing, cyanosis, or edema  Integument: Skin color, texture, turgor normal; no rashes; hair distrubution normal, no jaundice  Neurologic: Alert and oriented X 3, no focal sensory or motor neurologic deficits  Psychiatric: no pressured speech; normal affect; no evidence of impaired cognition, no anxiety/depression     Labs:  Lab Results   Component Value Date    WBC 6.47 09/11/2024    HGB 8.7 (L) 09/11/2024    HCT 26.3 (L) 09/11/2024    MCV 87 09/11/2024     09/11/2024         CMP  Sodium   Date Value Ref Range Status   09/12/2024 135 (L) 136 - 145 mmol/L Final     Potassium   Date Value Ref Range Status   09/12/2024 4.5 3.5 - 5.1 mmol/L Final     Chloride   Date Value Ref Range Status   09/12/2024 101 95 - 110 mmol/L Final     CO2   Date Value Ref Range Status   09/12/2024 21 (L) 23 - 29 mmol/L Final     Glucose   Date Value Ref Range Status   09/12/2024 371 (H) 70 - 110 mg/dL Final     BUN   Date Value Ref Range Status   09/12/2024 25 (H) 6 - 20 mg/dL Final     Creatinine   Date Value Ref Range Status   09/12/2024 7.5 (H) 0.5 - 1.4 mg/dL Final     Calcium   Date Value Ref Range Status   09/12/2024 8.7 8.7 - 10.5 mg/dL Final     Total Protein   Date Value Ref Range Status   09/10/2024 7.4 6.0 - 8.4 g/dL " Final     Albumin   Date Value Ref Range Status   09/10/2024 3.4 (L) 3.5 - 5.2 g/dL Final     Total Bilirubin   Date Value Ref Range Status   09/10/2024 1.1 (H) 0.1 - 1.0 mg/dL Final     Comment:     For infants and newborns, interpretation of results should be based  on gestational age, weight and in agreement with clinical  observations.    Premature Infant recommended reference ranges:  Up to 24 hours.............<8.0 mg/dL  Up to 48 hours............<12.0 mg/dL  3-5 days..................<15.0 mg/dL  6-29 days.................<15.0 mg/dL       Alkaline Phosphatase   Date Value Ref Range Status   09/10/2024 99 55 - 135 U/L Final     AST   Date Value Ref Range Status   09/10/2024 19 10 - 40 U/L Final     ALT   Date Value Ref Range Status   09/10/2024 13 10 - 44 U/L Final     Anion Gap   Date Value Ref Range Status   09/12/2024 13 8 - 16 mmol/L Final     eGFR   Date Value Ref Range Status   09/12/2024 7 (A) >60 mL/min/1.73 m^2 Final         Imaging:  CT scan was independently visualized and reviewed by me and showed gastric thickening and surgically absent gallbladder.    I have personally reviewed these images      Case discussed as above.    Assessment:   Epigastric pain  N/V  DM  DKA  Abnormal imaging    Plan:  Full liquid diet today  NPO past midnight  PPI  Nausea control with zofran  EGD tomorrow to evaluate  Further recommendations to follow after above.  Communication will be sent to the referring provider regarding my assessment and plan on this patient via EPIC.      Marcelo Aponte MD  [facility]  [address], [address]  [place] [address]  Office: [phone]  Fax: [phone]

## 2024-09-12 NOTE — PROGRESS NOTES
"INPATIENT NEPHROLOGY Progress Note   [facility]    Patient Name: Tabby Howard  Date: 09/12/2024    Reason for consultation: ESRD    Chief Complaint:   Chief Complaint   Patient presents with    Flank Pain     Bilateral flank pain, was seen at [facility] and discharged within the hour, states "I got tylenol but I need dilaudid or morphine, I can't handle the pain"        History of Present Illness:  Tabby Howard is a 34 year old female with a previous medical history of ESRD on dialysis Tue/Thur/Sat, chronic abdominal pain, HTN, Type II Diabetes, who presented to the ED for left-sided flank pain. HPI is limited to chart review and ED note due to patient somnolence. Per chart review, patient has multiple similar episodes in the past and has had multiple previous CT scans performed. Also has had multiple hospitalizations for DKA in the past. Patient had dialysis earlier today, and following this, she had bilateral flank pain. Left was worse than right. She states that she has been taking her long-acting insulin. She initially presented to [facility]. She had some lab work performed there which did show an anion gap with hyperglycemia that was elevated. At the outside facility, she repeatedly requested opioid analgesia which was not indicated at that time. Patient then stated that she would leave and go to another hospital. [facility] and came to [facility]. Upon arrival, complaining of bilateral with left-greater-than-right flank pain. States that she has been using her insulin although she has a long history of insulin noncompliance. Patient administered 2mg of ativan and 12.5 of IV phenergan in ED with alleviation of her pain symptoms. She was initiated on an insulin drip for DKA and admitted by hospital medicine for further evaluation and management. Consulted for dialysis.    Interval History:  9/11- missed HD yest- ordered HD today  9/12- got off 2L yest- volume status better- " routine HD/UF today; c/w some abd pain though better today    Plan of Care:    Assessment:  DKA  ESRD on HD TTS  HTN  SHPT  Anemia of CKD    Plan:    - on DKA Pathway- now on SQ insulin  - HD TTS  - resume home BP meds when able to take adequate PO   - resume binder with meals when able to take adequate PO  - ordered SHE[facility]LEY with HD TTS    Thank you for allowing us to participate in this patient's care. We will continue to follow.    Vital Signs:  Temp Readings from Last 3 Encounters:   09/12/24 98.2 °F (36.8 °C)   09/10/24 99.3 °F (37.4 °C) (Oral)   09/08/24 98.3 °F (36.8 °C) (Oral)       Pulse Readings from Last 3 Encounters:   09/12/24 87   09/10/24 96   09/08/24 70       BP Readings from Last 3 Encounters:   09/12/24 132/80   09/10/24 (!) 195/96   09/08/24 135/85       Weight:  Wt Readings from Last 3 Encounters:   09/10/24 52.7 kg (116 lb 2.9 oz)   09/10/24 53.1 kg (117 lb)   09/08/24 53.1 kg (117 lb)       Medications:  Scheduled Meds:   apixaban  5 mg Oral BID    atropine 1%  1 drop Left Eye BID    brimonidine 0.15 % OPTH DROP  1 drop Both Eyes TID    dorzolamide-timolol 2-0.5%  1 drop Left Eye Q12H    FLUoxetine  40 mg Oral Daily    insulin aspart U-100  10 Units Subcutaneous TID WM    insulin glargine U-100  25 Units Subcutaneous Daily    levETIRAcetam  500 mg Oral BID    pantoprazole  40 mg Intravenous Daily    sevelamer carbonate  800 mg Oral TID WM    sucralfate  1 g Oral QID    timolol maleate 0.5%  1 drop Left Eye BID     Continuous Infusions:   D5 and 0.45% NaCl   Intravenous Continuous PRN   Stopped at 09/11/24 0845     PRN Meds:.  Current Facility-Administered Medications:     acetaminophen, 650 mg, Oral, Q4H PRN    albuterol-ipratropium, 3 mL, Nebulization, Q6H PRN    busPIRone, 10 mg, Oral, TID PRN    dextrose 10%, 12.5 g, Intravenous, PRN    dextrose 10%, 25 g, Intravenous, PRN    D5 and 0.45% NaCl, , Intravenous, Continuous PRN    glucagon (human recombinant), 1 mg, Intramuscular, PRN    glucose, 16  g, Oral, PRN    glucose, 24 g, Oral, PRN    insulin aspart U-100, 0-10 Units, Subcutaneous, QID (AC + HS) PRN    ondansetron, 4 mg, Intravenous, Q6H PRN    promethazine (PHENERGAN) 12.5 mg in 0.9% NaCl 50 mL IVPB, 12.5 mg, Intravenous, Q6H PRN    sodium chloride 0.9%, 10 mL, Intravenous, PRN    sodium chloride 0.9%, 10 mL, Intravenous, PRN  No current facility-administered medications on file prior to encounter.     Current Outpatient Medications on File Prior to Encounter   Medication Sig Dispense Refill    acetaZOLAMIDE (DIAMOX) 250 MG tablet take 1 tablet by mouth 3 times a day 90 tablet 3    albuterol (VENTOLIN HFA) 90 mcg/actuation inhaler Inhale 2 puffs every 4 hours by inhalation route. 8 g 2    apixaban (ELIQUIS) 5 mg Tab Take 1 tablet (5 mg total) by mouth 2 (two) times daily. Patient w/ thrombocytopenia <50, so held during admission, follow-up with hematologist about restarting 180 tablet 1    atropine 1% (ISOPTO ATROPINE) 1 % Drop Place 1 drop into the left eye 2 (two) times a day. 15 mL 3    brimonidine 0.15 % OPTH DROP (ALPHAGAN) 0.15 % ophthalmic solution Place 1 drop into both eyes 3 (three) times daily. 15 mL 3    brinzolamide (AZOPT) 1 % ophthalmic suspension Place1 drop in left eye 3 times a day for 30 days 15 mL 6    busPIRone (BUSPAR) 10 MG tablet Take 1 tablet (10 mg total) by mouth 3 (three) times daily as needed (anxiety). 60 tablet 5    cetirizine (ZYRTEC) 10 MG tablet Take 1 tablet every day by oral route. 30 tablet 0    dorzolamide-timolol 2-0.5% (COSOPT) 22.3-6.8 mg/mL ophthalmic solution place 1 drop in left eye twice a day  for 30 days 10 mL 0    FLUoxetine 40 MG capsule Take 1 capsule every day by oral route. 30 capsule 2    fluticasone propionate (FLONASE ALLERGY RELIEF) 50 mcg/actuation nasal spray [place] 1 spray every day by intranasal route. 16 g 2    gabapentin (NEURONTIN) 300 MG capsule Take 2 capsules twice a day by oral route for 90 days. 360 capsule 1    hydrALAZINE (APRESOLINE)  "50 MG tablet Take 1 tablet (50 mg total) by mouth every 8 (eight) hours. 90 tablet 0    insulin aspart U-100 (NOVOLOG) 100 unit/mL (3 mL) InPn pen Inject 8 Units into the skin 3 (three) times daily with meals. 15 mL 0    insulin glargine U-100, Lantus, (LANTUS SOLOSTAR U-100 INSULIN) 100 unit/mL (3 mL) InPn pen Inject 22 units every day by subcutaneous route in the evening for 30 days, for diabetes. 15 mL 2    levETIRAcetam (KEPPRA) 500 MG Tab Take 1 tablet (500 mg total) by mouth 2 (two) times daily. 60 tablet 11    LORazepam (ATIVAN) 0.5 MG tablet Take 1 tablet 3 times a day by oral route for 7 days, for severe panic. 21 tablet 2    ondansetron (ZOFRAN-ODT) 4 MG TbDL Place 1 tablet (4 mg) on or under the tongue every 8 hours for 10 days. 24 tablet 2    oxyCODONE-acetaminophen (PERCOCET)  mg per tablet Take 1 tablet by mouth every 4 (four) hours as needed for Pain. 42 tablet 0    pantoprazole (PROTONIX) 40 MG tablet Take 1 tablet (40 mg total) by mouth once daily. 30 tablet 0    pen needle, diabetic 31 gauge x 3/16" Ndle Use as directed with insulin once daily 100 each 0    prednisoLONE acetate (PRED FORTE) 1 % DrpS Place 1 drop into the left eye 2 (two) times daily. 5 mL 3    promethazine (PHENERGAN) 25 MG tablet Take 1 tablet (25 mg total) by mouth every 6 (six) hours as needed. 15 tablet 0    sevelamer carbonate (RENVELA) 800 mg Tab Take 1 tablet (800 mg total) by mouth 3 (three) times daily with meals. 180 tablet 11    sucralfate (CARAFATE) 1 gram tablet Take 1 tablet (1 g total) by mouth 4 (four) times daily. 100 tablet 1    timolol maleate 0.5% (TIMOPTIC) 0.5 % Drop Place 1 drop in left eye 2 times a day for 30 days 5 mL 6    blood sugar diagnostic (TRUE METRIX GLUCOSE TEST STRIP) Strp Use 1 strip to check blood glucose three times daily 100 each 11    blood-glucose meter (TRUE METRIX GLUCOSE METER) Misc Use to check blood glucose 1 each 0    blood-glucose meter,continuous Misc USE WITH SENSORS 1 each 0 "    blood-glucose sensor (DEXCOM G7 SENSOR) Heather Use as directed for CGM. Change sensor every 10 days 3 each 0    blood-glucose sensor Heather CHANGE EVERY 10 DAYS 3 each 0    lancets (TRUEPLUS LANCETS) 33 gauge Misc Use 1 to check blood glucose three times daily 100 each 11    [DISCONTINUED] atenoloL (TENORMIN) 50 MG tablet Take 1 tablet (50 mg total) by mouth every other day. 45 tablet 3    [DISCONTINUED] insulin detemir U-100, Levemir, (LEVEMIR FLEXTOUCH U100 INSULIN) 100 unit/mL (3 mL) InPn pen Inject 22 units every day by subcutaneous route in the evening for 90 days. 18 mL 1    [DISCONTINUED] omeprazole (PRILOSEC) 20 MG capsule Take 2 capsules (40 mg total) by mouth once daily. for 10 days 20 capsule 0       Review of Systems:  Neg    Physical Exam:  General Appearance:    NAD, AAO x 3, cooperative, appears stated age   Head:    Normocephalic, atraumatic   Eyes:    PER, EOMI, and conjunctiva/sclera clear bilaterally       Mouth:   Moist mucus membranes, no thrush or oral lesions,       normal dentition   Back:     No CVA tenderness   Lungs:     Clear to auscultation bilaterally, no wheezes, crackles,           rales or rhonchi, symmetric air movement, respirations unlabored   Chest wall:    No tenderness or deformity   Heart:    Regular rate and rhythm, S1 and S2 normal, no murmur, rub   or gallop   Abdomen:     Soft, tender, non-distended, bowel sounds active all four   quadrants, no RT or guarding, no masses, no organomegaly   Extremities:   Warm and well perfused, distal pulses are intact, no             cyanosis, + edema   MSK:   No joint or muscle swelling, tenderness or deformity   Skin:   Skin color, texture, turgor normal, no rashes or lesions   Neurologic/Psychiatric:   CNII-XII intact, normal strength and sensation       throughout, no asterixis; normal affect, memory, judgement     and insight      Results:  Lab Results   Component Value Date     (L) 09/12/2024    K 4.5 09/12/2024      "09/12/2024    CO2 21 (L) 09/12/2024    BUN 25 (H) 09/12/2024    CREATININE 7.5 (H) 09/12/2024    CALCIUM 8.7 09/12/2024    ANIONGAP 13 09/12/2024    ESTGFRAFRICA 19 (L) 09/03/2022    EGFRNONAA 18 (A) 07/31/2022       Lab Results   Component Value Date    CALCIUM 8.7 09/12/2024    PHOS 4.9 (H) 09/12/2024       No results for input(s): "WBC", "RBC", "HGB", "HCT", "PLT", "MCV", "MCH", "MCHC" in the last 24 hours.      I have personally reviewed pertinent radiological imaging and reports from this admission.    I have spent > 35 minutes providing care for this patient for the above diagnoses. These services have included chart/data/imaging review, evaluation, exam, formulation of plan, , note preparation, and discussions with staff involved in this patient's care.    Prateek Clark MD MPH  [facility]  [address] [place] [id number]  [phone] (p)  [phone] (f)  "

## 2024-09-12 NOTE — PROGRESS NOTES
[facility] Medicine  Progress Note    Patient Name: Tabby Howard  MRN: [id number]  Patient Class: OP- Observation   Admission Date: 9/10/2024  Length of Stay: 0 days  Attending Physician: Oneyda Hinds MD  Primary Care Provider: Melony Kim NP        Subjective:     Principal Problem:High anion gap metabolic acidosis        HPI:  Tabby Howard is a 34 year old female with a previous medical history of ESRD on dialysis Tue/Thur/Sat, chronic abdominal pain, HTN, Type II Diabetes, who presented to the ED  for left-sided flank pain. HPI is limited to chart review and ED note due to patient somnolence.  Per chart review, patient has multiple similar episodes in the past and has had multiple previous CT scans performed.  Also has had multiple hospitalizations for DKA in the past.  Patient had dialysis earlier today, and following this, she had bilateral flank pain.  Left was worse than right.  She states that she has been taking her long-acting insulin.  She initially presented to [facility].  She had some lab work performed there which did show an anion gap with hyperglycemia that was elevated.  At the outside facility, she repeatedly requested opioid analgesia which was not indicated at that time.  Patient then stated that she would leave and go to another hospital.  [facility] and came to [facility].  Upon arrival, complaining of bilateral with left-greater-than-right flank pain.  States that she has been using her insulin although she has a long history of insulin noncompliance. Patient administered 2mg of ativan and 12.5 of IV phenergan in ED with alleviation of her pain symptoms. She was initiated on an insulin drip for DKA and admitted by hospital medicine for further evaluation and management.     Overview/Hospital Course:  No notes on file    Interval History:   Seen and examined at multidisciplinary rounds, patient is awake alert, AAO times, complaining  of severe left flank pain, asking for IV Dilaudid.  CT reviewed with the patient.  Patient really can not tell me why she is taking acetazolamide.     Review of Systems   Gastrointestinal:  Positive for abdominal pain.     Objective:     Vital Signs (Most Recent):  Temp: 98.2 °F (36.8 °C) (09/12/24 0733)  Pulse: 87 (09/12/24 0825)  Resp: 17 (09/12/24 0746)  BP: 132/80 (09/12/24 0825)  SpO2: 100 % (09/12/24 0733) Vital Signs (24h Range):  Temp:  [98 °F (36.7 °C)-98.4 °F (36.9 °C)] 98.2 °F (36.8 °C)  Pulse:  [82-93] 87  Resp:  [12-22] 17  SpO2:  [95 %-100 %] 100 %  BP: (132-224)/() 132/80     Weight: 52.7 kg (116 lb 2.9 oz)  Body mass index is 21.25 kg/m².    Intake/Output Summary (Last 24 hours) at 9/12/2024 1017  Last data filed at 9/12/2024 0513  Gross per 24 hour   Intake 840 ml   Output 2500 ml   Net -1660 ml         Physical Exam  Constitutional:       Appearance: She is ill-appearing.   HENT:      Head: Normocephalic and atraumatic.      Nose: Nose normal.   Eyes:      Extraocular Movements: Extraocular movements intact.      Pupils: Pupils are equal, round, and reactive to light.   Cardiovascular:      Rate and Rhythm: Normal rate and regular rhythm.      Heart sounds: No murmur heard.  Pulmonary:      Effort: Pulmonary effort is normal.      Breath sounds: Normal breath sounds.   Abdominal:      General: Abdomen is flat.      Palpations: Abdomen is soft.      Tenderness: There is abdominal tenderness.   Musculoskeletal:         General: Normal range of motion.      Cervical back: Normal range of motion and neck supple.   Skin:     General: Skin is warm and dry.   Neurological:      General: No focal deficit present.      Mental Status: She is alert.      Comments: Lethargic   Psychiatric:         Mood and Affect: Mood normal.             Significant Labs: All pertinent labs within the past 24 hours have been reviewed.  CBC:   Recent Labs   Lab 09/10/24  1255 09/10/24  1745 09/11/24  0420   WBC 8.57  10.24 6.47   HGB 10.3* 9.8* 8.7*   HCT 30.5* 28.2* 26.3*    245 200     CMP:   Recent Labs   Lab 09/10/24  1255 09/10/24  1745 09/10/24  2106 09/11/24  0031 09/11/24  0420 09/12/24  0436    137   < > 140 138 135*   K 4.1 4.1   < > 3.9 3.8 4.5   CL 93* 96   < > 100 98 101   CO2 22* 19*   < > 25 24 21*   * 217*   < > 80 173* 371*   BUN 30* 32*   < > 33* 35* 25*   CREATININE 7.6* 8.5*   < > 8.7* 8.8* 7.5*   CALCIUM 9.5 9.3   < > 8.7 8.6* 8.7   PROT 7.5 7.4  --   --   --   --    ALBUMIN 4.1 3.4*  --   --   --   --    BILITOT 1.0 1.1*  --   --   --   --    ALKPHOS 99 99  --   --   --   --    AST 18 19  --   --   --   --    ALT 14 13  --   --   --   --    ANIONGAP 21* 22*   < > 15 16 13    < > = values in this interval not displayed.       Significant Imaging: I have reviewed all pertinent imaging results/findings within the past 24 hours.  I have reviewed and interpreted all pertinent imaging results/findings within the past 24 hours.    Scheduled Meds:   atropine 1%  1 drop Left Eye BID    brimonidine 0.15 % OPTH DROP  1 drop Both Eyes TID    dorzolamide-timolol 2-0.5%  1 drop Left Eye Q12H    epoetin teresa-epbx  100 Units/kg Intravenous Every Tues, Thurs, Sat    FLUoxetine  40 mg Oral Daily    insulin aspart U-100  10 Units Subcutaneous TID WM    insulin glargine U-100  25 Units Subcutaneous Daily    levETIRAcetam  500 mg Oral BID    pantoprazole  40 mg Intravenous Daily    sevelamer carbonate  800 mg Oral TID WM    sucralfate  1 g Oral QID    timolol maleate 0.5%  1 drop Left Eye BID     Continuous Infusions:   D5 and 0.45% NaCl   Intravenous Continuous PRN   Stopped at 09/11/24 0845     PRN Meds:.  Current Facility-Administered Medications:     acetaminophen, 650 mg, Oral, Q4H PRN    albuterol-ipratropium, 3 mL, Nebulization, Q6H PRN    busPIRone, 10 mg, Oral, TID PRN    dextrose 10%, 12.5 g, Intravenous, PRN    dextrose 10%, 25 g, Intravenous, PRN    D5 and 0.45% NaCl, , Intravenous, Continuous  PRN    glucagon (human recombinant), 1 mg, Intramuscular, PRN    glucose, 16 g, Oral, PRN    glucose, 24 g, Oral, PRN    insulin aspart U-100, 0-10 Units, Subcutaneous, QID (AC + HS) PRN    ondansetron, 4 mg, Intravenous, Q6H PRN    promethazine (PHENERGAN) 12.5 mg in 0.9% NaCl 50 mL IVPB, 12.5 mg, Intravenous, Q6H PRN    sodium chloride 0.9%, 10 mL, Intravenous, PRN    sodium chloride 0.9%, 10 mL, Intravenous, PRN      CT Abdomen Pelvis  Without Contrast    Result Date: 9/11/2024  EXAMINATION: CT ABDOMEN PELVIS WITHOUT CONTRAST CLINICAL HISTORY: Abdominal pain, acute, nonlocalized; TECHNIQUE: Low dose axial images, sagittal and coronal reformations were obtained from the lung bases to the pubic symphysis.  Oral contrast was not administered. COMPARISON: Multiple priors, most recent 08/28/2024 FINDINGS: Mild cardiomegaly, unchanged.  Small pericardial effusion measuring simple fluid attenuation, decreased compared to prior.  Basilar airspace opacities improved. Please note in the absence of IV contra valuation for solid organ mass is limited. Liver is normal in morphology and with smooth contour.  Gallbladder surgically absent.  No intra or extrahepatic biliary ductal dilatation. Atrophic kidneys.  No hydronephrosis. The spleen adrenal glands and pancreas are normal. Gastric wall thickening and edema.  The abdominal aorta is normal in caliber with advanced calcification for patient's age including of the mesenteric and splenic arteries. No enlarged retroperitoneal lymph nodes. Normal appendix.  No bowel obstruction or inflammatory change.  No mid or free air. Circumferential bladder wall thickening.  Uterus and are unremarkable.  No enlarged pelvic lymph. Lower anterior abdominal subcutaneous edema similar prior, possibly from subcutaneous injections. No acute or aggressive osseous abnormality.     Findings concerning for infectious/inflammatory gastritis. Thick-walled bladder as may be seen in the setting of  cystitis.  Correlate with urinalysis. Improved pericardial effusion and basilar opacities. Electronically signed by: Tayler Squires Date:    09/11/2024 Time:    11:22    X-Ray Chest AP Portable    Result Date: 8/28/2024  EXAMINATION: XR CHEST AP PORTABLE CLINICAL HISTORY: End stage renal disease TECHNIQUE: Single frontal view of the chest was performed. COMPARISON: 08/22/2024. FINDINGS: Stable cardiomegaly. Heart and lungs  appear unchanged when allowing for differences in technique and positioning.     Stable cardiomegaly. No significant change from prior study. Electronically signed by: Sandeep Brower MD Date:    08/28/2024 Time:    21:42    CT Renal Stone Study ABD Pelvis WO    Result Date: 8/28/2024  EXAMINATION: CT RENAL STONE STUDY ABD PELVIS WO CLINICAL HISTORY: Flank pain, kidney stone suspected; TECHNIQUE: Low dose axial images, sagittal and coronal reformations were obtained from the lung bases to the pubic symphysis.  Contrast was not administered. COMPARISON: 08/20/2024. FINDINGS: Heart: Cardiomegaly with moderate pericardial effusion, similar compared to prior. Lung Bases: Increased vascular congestion at the lung bases with central perivascular edema.  No pleural effusions. Liver: Normal in size and attenuation, with no focal hepatic lesions. Gallbladder: Surgically absent. Bile Ducts: No evidence of dilated ducts. Pancreas: Unchanged from prior. Spleen: Unchanged from prior. Adrenals: Unremarkable. Kidneys/ Ureters: Bilateral renal atrophy.  Extensive renal vascular calcifications.  No renal calculi or hydronephrosis. Bladder: Diffuse bladder wall thickening, similar to prior. Reproductive organs: Right adnexal enlargement, nonspecific, similar compared to recent prior.  Extensive vascular calcifications in the adnexa. GI Tract/Mesentery: No evidence of bowel obstruction.  Bowel loops appears similar to prior. Peritoneal Space: No ascites. No free air. Retroperitoneum: Enlarged left periaortic lymph  nodes, unchanged from recent prior. Abdominal wall: Anasarca, similar to prior.  Postsurgical changes abdominal wall, similar to prior. Vasculature: Extensive vascular calcifications present, similar compared to prior study.  No abdominal aortic aneurysm. Bones: No acute fracture.     Cardiomegaly with moderate pericardial effusion, similar to prior.  Interval development of vascular congestion and perivascular edema at the lung bases. Anasarca, similar to prior. Bilateral renal atrophy.  No renal/ureteral calculi or hydroureteronephrosis. Diffuse bladder wall thickening, nonspecific, unchanged from prior. Left periaortic lymph node enlargement, unchanged from recent prior. Right adnexal enlargement, nonspecific, unchanged from recent prior. Extensive vascular calcifications, unchanged from recent prior. Additional findings as above. Electronically signed by: Sandeep Brower MD Date:    08/28/2024 Time:    21:12    CT Abdomen Pelvis  Without Contrast    Result Date: 8/22/2024  EXAMINATION: CT ABDOMEN PELVIS WITHOUT CONTRAST CLINICAL HISTORY: Abdominal pain, acute, nonlocalized; TECHNIQUE: Low dose axial images, sagittal and coronal reformations were obtained from the lung bases to the pubic symphysis without IV contrast.  Oral contrast was not administered. COMPARISON: CT 08/20/2024 FINDINGS: There is mild mosaic alteration of the visualized lung parenchyma.  There are scattered bandlike opacities suggesting atelectasis or scarring.  No significant pleural fluid.  Heart is enlarged.  There is a moderate to large pericardial effusion. Liver is mildly enlarged.  No focal hepatic abnormality on this limited noncontrast study.  The gallbladder is surgically absent.  There is no intra-or extrahepatic biliary ductal dilatation. Stomach appears within normal limits.  Pancreas and adrenal glands are unremarkable for noncontrast technique.  Spleen is mildly enlarged, similar to prior examination. Bilateral native kidneys are  atrophic with prominent vascular calcifications.  There is no significant hydronephrosis.  Urinary bladder demonstrates nonspecific circumferential wall thickening.  Noncontrast appearance of the uterus is unremarkable.  Stable appearance of the bilateral adnexa.  No significant free fluid in the pelvis. The abdominal aorta is normal in course and caliber extensive calcification of the abdominal aorta and visceral branch vessels.  There is no retroperitoneal hematoma. There are mildly prominent fluid-filled loops of small bowel throughout the abdomen and pelvis without discrete transition point which may reflect a nonspecific enteritis.  There is a moderate volume of retained stool throughout the colon suggesting constipation.  No CT evidence of acute appendicitis.  There is no ascites, portal venous gas, or free intraperitoneal air.  There is a small fat containing umbilical hernia.  There are scattered shotty mesenteric lymph nodes.  There are mildly prominent retroperitoneal lymph nodes, similar to prior study. Osseous structures demonstrate no acute fracture.  There is mild diffuse body wall edema.  There is a somewhat more focal region of soft tissue induration within the left paramidline lower abdominal wall possibly a prior injection site or hematoma.  Clinical correlation with physical examination advised.     Atrophic bilateral native kidneys.  No evidence of hydronephrosis. Mild nonspecific urinary bladder wall thickening.  Correlation with urinalysis advised. Moderate to large volume pericardial effusion.  Cardiomegaly. Mildly prominent fluid-filled loops of small bowel without discrete transition point which may reflect a nonspecific enteritis. Mild hepatosplenomegaly. Retained stool throughout the colon suggesting constipation. Additional findings as above. Electronically signed by: Kaushik Diego MD Date:    08/22/2024 Time:    20:35    X-Ray Chest AP Portable    Result Date: 8/22/2024  EXAMINATION:  XR CHEST AP PORTABLE CLINICAL HISTORY: Dorsalgia, unspecified TECHNIQUE: Portable view of the chest was performed. COMPARISON: 08/20/2024. FINDINGS: Lungs are clear.  No focal consolidation.  Heart size is enlarged.  Mediastinal contours unremarkable.  Trachea midline. Bony thorax intact.     Cardiomegaly. Electronically signed by: Varun Trejo Date:    08/22/2024 Time:    06:34    CT Renal Stone Study ABD Pelvis WO    Result Date: 8/20/2024  EXAMINATION: CT RENAL STONE STUDY ABD PELVIS WO CLINICAL HISTORY: Flank pain, kidney stone suspected;left; TECHNIQUE: Low dose axial images, sagittal and coronal reformations were obtained from the lung bases to the pubic symphysis.  Contrast was not administered. COMPARISON: July 16, 2024 FINDINGS: Mild bibasal atelectasis is evident.  There is moderate-sized pericardial effusion.  There is evidence of cardiomegaly and anasarca.  Extensive vascular calcifications are present.  There is evidence of prior cholecystectomy.  There is evidence of renal atrophy bilaterally.  There is an enlarged left periaortic lymph node measuring 13 mm in small stone mentions.  This is nonspecific but is similar to the prior examination.  There is a right adnexal mass which is chronic and may represent a ovarian mass or uterine fibroid.  This area measures 5 x 3.7 cm and is unchanged relative to 2023 a few sigmoid diverticula are seen.  I do not see CT evidence of diverticulitis.  There is no evidence of nephrolithiasis, hydronephrosis or hydroureter.  There is no evidence of bowel obstruction.     No evidence of nephrolithiasis, hydronephrosis or hydroureter. Bladder wall thickening which is nonspecific and evidence of bilateral renal atrophy. Cardiomegaly anasarca and evidence of prior cholecystectomy are again noted. Stable left periaortic enlarged lymph node. Limited evaluation of the colon and parenchyma. Right adnexal mass which is chronic and could represent a uterine fibroid but was not  confidently seen on prior ultrasound. Bibasal atelectasis Electronically signed by: Thanh Davila MD Date:    08/20/2024 Time:    09:03    X-Ray Chest AP Portable    Result Date: 8/20/2024  EXAMINATION: XR CHEST AP PORTABLE CLINICAL HISTORY: Cough, unspecified TECHNIQUE: Portable view of the chest was performed. COMPARISON: 07/18/2024. FINDINGS: Lungs are clear.  No focal consolidation.  Heart size is enlarged.  Mediastinal contours unremarkable.  Trachea midline. Bony thorax intact.     Cardiomegaly. Electronically signed by: Varun Trejo Date:    08/20/2024 Time:    08:46  - pulls last radiology orders      Assessment/Plan:      * High anion gap metabolic acidosis  Patient has high anion gap metabolic acidosis upon admission, beta hydroxybutyrate negative, most likely secondary to ESRD, and also patient taking Diamox at home- unclear etiology.  Patient does not have DKA, no nausea vomiting diarrhea.   Patient has long history of gastroparesis, not candidate for Reglan- patient has significant QTC prolongation .   Start diabetic renal diet.  Resume insulin sliding scale  Discontinue Diamox, unclear why she is on this medication.    Increase Lantus 25 units in the a.m., lispro to 10 units t.i.d. pre meals      Abdominal pain    Chronic, unclear etiology, most likely secondary to gastroparesis?.     CT reviewed shows questionable inflammatory gastritis.  We will ask GI evaluation  Possible need EGD, keep patient is NPO now    ESRD (end stage renal disease) on dialysis  Last Dialysis on 9/10/24.    -IP consult to nephrology for dialysis orders    Diabetes  Patient's FSGs are uncontrolled due to hyperglycemia on current medication regimen.  Last A1c reviewed-   Lab Results   Component Value Date    HGBA1C 9.0 (H) 09/10/2024     Most recent fingerstick glucose reviewed-   Recent Labs   Lab 09/11/24  0436 09/11/24  0533 09/11/24  0628 09/11/24  0737   POCTGLUCOSE 154* 178* 183* 195*     Current correctional  scale  Medium  Maintain anti-hyperglycemic dose as follows-   Antihyperglycemics (From admission, onward)      Start     Stop Route Frequency Ordered    09/11/24 2100  insulin glargine U-100 (Lantus) pen 15 Units         -- SubQ Nightly 09/11/24 1030    09/11/24 1200  insulin aspart U-100 pen 8 Units         -- SubQ 3 times daily with meals 09/11/24 1030    09/11/24 0856  insulin aspart U-100 pen 0-10 Units         -- SubQ Before meals & nightly PRN 09/11/24 0757          Hold Oral hypoglycemics while patient is in the hospital.    QT prolongation      Avoid medications does prolonged QTC    Hypertension  Chronic, uncontrolled. Latest blood pressure and vitals reviewed-     Temp:  [97.9 °F (36.6 °C)-99.3 °F (37.4 °C)]   Pulse:  []   Resp:  [19-22]   BP: (162-224)/()   SpO2:  [97 %-100 %] .   Home meds for hypertension were reviewed and noted below.   Hypertension Medications               hydrALAZINE (APRESOLINE) 50 MG tablet Take 1 tablet (50 mg total) by mouth every 8 (eight) hours.            While in the hospital, will manage blood pressure as follows; Adjust home antihypertensive regimen as follows- Continue home medications and use PRN IV hydralazine     Will utilize p.r.n. blood pressure medication only if patient's blood pressure greater than 180/110 and she develops symptoms such as worsening chest pain or shortness of breath.    Anemia in ESRD (end-stage renal disease)  Anemia is likely due to chronic disease due to ESRD. Most recent hemoglobin and hematocrit are listed below.  Recent Labs     09/08/24  1235 09/10/24  1255 09/10/24  1745   HGB 9.2* 10.3* 9.8*   HCT 27.7* 30.5* 28.2*     Plan  - Monitor serial CBC: Daily  - Transfuse PRBC if patient becomes hemodynamically unstable, symptomatic or H/H drops below 7/21.  - Patient has not received any PRBC transfusions to date  - Patient's anemia is currently stable      Chronic congestive heart failure with left ventricular diastolic  dysfunction  Patient is identified as having Diastolic (HFpEF) heart failure that is Chronic. CHF is currently controlled. Latest ECHO performed and demonstrates- Results for orders placed during the hospital encounter of 07/16/24    Echo    Interpretation Summary    Left Ventricle: The left ventricle is normal in size. Normal wall thickness. There is normal systolic function with a visually estimated ejection fraction of 65 - 70%. There is normal diastolic function.    Right Ventricle: Normal right ventricular cavity size. Wall thickness is normal. Systolic function is normal.    Pericardium: There is a small effusion. No indication of cardiac tamponade. Evidence includes no chamber collapse, no respiratory chamber variation.  Will continue home hydralazine and monitor clinical status closely. Monitor on telemetry. Patient is off CHF pathway.  Monitor strict Is&Os and daily weights.  Place on fluid restriction of 1.5 L. Cardiology has not been consulted. Continue to stress to patient importance of self efficacy and  on diet for CHF.         Drug-seeking behavior    Persistent asking for IV Ativan/ narcotics    Gastroparesis - suspected, unconfirmed  -IV ativan 1mg Q 4 hours  -IV phenergan 12mg Q 6 hours PRN nausea          VTE Risk Mitigation (From admission, onward)           Ordered     IP VTE HIGH RISK PATIENT  Once         09/10/24 2050     Place JOCELYNE hose  Until discontinued         09/10/24 2050     Place sequential compression device  Until discontinued         09/10/24 2050     Reason for No Pharmacological VTE Prophylaxis  Once        Question:  Reasons:  Answer:  Already adequately anticoagulated on oral Anticoagulants    09/10/24 2050     Place sequential compression device  Until discontinued         09/10/24 1908                    Discharge Planning   TATIANA: 9/13/2024     Code Status: Full Code   Is the patient medically ready for discharge?:     Reason for patient still in hospital (select all  that apply): Patient trending condition and Treatment  Discharge Plan A: Home with family                  Onedya Hinds MD  Department of Hospital Medicine   [facility]/Surg

## 2024-09-13 ENCOUNTER — ANESTHESIA (OUTPATIENT)
Dept: ENDOSCOPY | Facility: HOSPITAL | Age: 34
End: 2024-09-13
Payer: MEDICAID

## 2024-09-13 ENCOUNTER — ANESTHESIA EVENT (OUTPATIENT)
Dept: ENDOSCOPY | Facility: HOSPITAL | Age: 34
End: 2024-09-13
Payer: MEDICAID

## 2024-09-13 LAB
ANION GAP SERPL CALC-SCNC: 12 MMOL/L (ref 8–16)
BUN SERPL-MCNC: 15 MG/DL (ref 6–20)
CALCIUM SERPL-MCNC: 9.5 MG/DL (ref 8.7–10.5)
CHLORIDE SERPL-SCNC: 101 MMOL/L (ref 95–110)
CO2 SERPL-SCNC: 25 MMOL/L (ref 23–29)
CREAT SERPL-MCNC: 4.9 MG/DL (ref 0.5–1.4)
EST. GFR  (NO RACE VARIABLE): 11 ML/MIN/1.73 M^2
GLUCOSE SERPL-MCNC: 43 MG/DL (ref 70–110)
MAGNESIUM SERPL-MCNC: 2.1 MG/DL (ref 1.6–2.6)
PHOSPHATE SERPL-MCNC: 3.6 MG/DL (ref 2.7–4.5)
POCT GLUCOSE: 101 MG/DL (ref 70–110)
POCT GLUCOSE: 130 MG/DL (ref 70–110)
POCT GLUCOSE: 144 MG/DL (ref 70–110)
POCT GLUCOSE: 149 MG/DL (ref 70–110)
POCT GLUCOSE: 163 MG/DL (ref 70–110)
POCT GLUCOSE: 213 MG/DL (ref 70–110)
POCT GLUCOSE: 216 MG/DL (ref 70–110)
POCT GLUCOSE: 278 MG/DL (ref 70–110)
POCT GLUCOSE: 306 MG/DL (ref 70–110)
POCT GLUCOSE: 318 MG/DL (ref 70–110)
POCT GLUCOSE: 48 MG/DL (ref 70–110)
POCT GLUCOSE: 49 MG/DL (ref 70–110)
POCT GLUCOSE: 76 MG/DL (ref 70–110)
POTASSIUM SERPL-SCNC: 3.7 MMOL/L (ref 3.5–5.1)
SODIUM SERPL-SCNC: 138 MMOL/L (ref 136–145)

## 2024-09-13 PROCEDURE — 11000001 HC ACUTE MED/SURG PRIVATE ROOM

## 2024-09-13 PROCEDURE — 0DB68ZX EXCISION OF STOMACH, VIA NATURAL OR ARTIFICIAL OPENING ENDOSCOPIC, DIAGNOSTIC: ICD-10-PCS | Performed by: INTERNAL MEDICINE

## 2024-09-13 PROCEDURE — 63600175 PHARM REV CODE 636 W HCPCS: Performed by: HOSPITALIST

## 2024-09-13 PROCEDURE — 37000008 HC ANESTHESIA 1ST 15 MINUTES: Performed by: INTERNAL MEDICINE

## 2024-09-13 PROCEDURE — 94761 N-INVAS EAR/PLS OXIMETRY MLT: CPT

## 2024-09-13 PROCEDURE — 83735 ASSAY OF MAGNESIUM: CPT

## 2024-09-13 PROCEDURE — 25000003 PHARM REV CODE 250: Performed by: STUDENT IN AN ORGANIZED HEALTH CARE EDUCATION/TRAINING PROGRAM

## 2024-09-13 PROCEDURE — 80048 BASIC METABOLIC PNL TOTAL CA: CPT | Performed by: HOSPITALIST

## 2024-09-13 PROCEDURE — 43239 EGD BIOPSY SINGLE/MULTIPLE: CPT | Performed by: INTERNAL MEDICINE

## 2024-09-13 PROCEDURE — 99900035 HC TECH TIME PER 15 MIN (STAT)

## 2024-09-13 PROCEDURE — 25000003 PHARM REV CODE 250: Performed by: NURSE ANESTHETIST, CERTIFIED REGISTERED

## 2024-09-13 PROCEDURE — 84100 ASSAY OF PHOSPHORUS: CPT

## 2024-09-13 PROCEDURE — 36415 COLL VENOUS BLD VENIPUNCTURE: CPT

## 2024-09-13 PROCEDURE — 25000003 PHARM REV CODE 250: Performed by: HOSPITALIST

## 2024-09-13 PROCEDURE — 27201012 HC FORCEPS, HOT/COLD, DISP: Performed by: INTERNAL MEDICINE

## 2024-09-13 PROCEDURE — 43239 EGD BIOPSY SINGLE/MULTIPLE: CPT | Mod: ,,, | Performed by: INTERNAL MEDICINE

## 2024-09-13 PROCEDURE — 25000003 PHARM REV CODE 250

## 2024-09-13 PROCEDURE — 37000009 HC ANESTHESIA EA ADD 15 MINS: Performed by: INTERNAL MEDICINE

## 2024-09-13 PROCEDURE — 63600175 PHARM REV CODE 636 W HCPCS

## 2024-09-13 RX ORDER — HYDROMORPHONE HYDROCHLORIDE 1 MG/ML
0.5 INJECTION, SOLUTION INTRAMUSCULAR; INTRAVENOUS; SUBCUTANEOUS EVERY 6 HOURS PRN
Status: DISCONTINUED | OUTPATIENT
Start: 2024-09-13 | End: 2024-09-14 | Stop reason: HOSPADM

## 2024-09-13 RX ORDER — OXYCODONE AND ACETAMINOPHEN 5; 325 MG/1; MG/1
1 TABLET ORAL EVERY 4 HOURS PRN
Status: DISCONTINUED | OUTPATIENT
Start: 2024-09-13 | End: 2024-09-14 | Stop reason: HOSPADM

## 2024-09-13 RX ORDER — LIDOCAINE HYDROCHLORIDE 20 MG/ML
INJECTION INTRAVENOUS
Status: DISCONTINUED | OUTPATIENT
Start: 2024-09-13 | End: 2024-09-13

## 2024-09-13 RX ORDER — HYDRALAZINE HYDROCHLORIDE 25 MG/1
50 TABLET, FILM COATED ORAL EVERY 8 HOURS PRN
Status: DISCONTINUED | OUTPATIENT
Start: 2024-09-13 | End: 2024-09-14 | Stop reason: HOSPADM

## 2024-09-13 RX ORDER — SODIUM CHLORIDE 9 MG/ML
INJECTION, SOLUTION INTRAVENOUS CONTINUOUS
Status: DISCONTINUED | OUTPATIENT
Start: 2024-09-13 | End: 2024-09-13

## 2024-09-13 RX ORDER — MUPIROCIN 20 MG/G
OINTMENT TOPICAL 2 TIMES DAILY
Status: DISCONTINUED | OUTPATIENT
Start: 2024-09-13 | End: 2024-09-14 | Stop reason: HOSPADM

## 2024-09-13 RX ORDER — DEXTROSE MONOHYDRATE 100 MG/ML
INJECTION, SOLUTION INTRAVENOUS CONTINUOUS
Status: DISCONTINUED | OUTPATIENT
Start: 2024-09-13 | End: 2024-09-13

## 2024-09-13 RX ORDER — PROPOFOL 10 MG/ML
INJECTION, EMULSION INTRAVENOUS
Status: DISCONTINUED | OUTPATIENT
Start: 2024-09-13 | End: 2024-09-13

## 2024-09-13 RX ADMIN — SEVELAMER CARBONATE 800 MG: 800 TABLET, FILM COATED ORAL at 05:09

## 2024-09-13 RX ADMIN — DORZOLAMIDE HYDROCHLORIDE TIMOLOL MALEATE 1 DROP: 20; 5 SOLUTION/ DROPS OPHTHALMIC at 10:09

## 2024-09-13 RX ADMIN — ONDANSETRON 4 MG: 2 INJECTION INTRAMUSCULAR; INTRAVENOUS at 08:09

## 2024-09-13 RX ADMIN — SUCRALFATE 1 G: 1 TABLET ORAL at 05:09

## 2024-09-13 RX ADMIN — HYDRALAZINE HYDROCHLORIDE 50 MG: 25 TABLET ORAL at 05:09

## 2024-09-13 RX ADMIN — MUPIROCIN 1 G: 20 OINTMENT TOPICAL at 08:09

## 2024-09-13 RX ADMIN — SUCRALFATE 1 G: 1 TABLET ORAL at 08:09

## 2024-09-13 RX ADMIN — HYDROMORPHONE HYDROCHLORIDE 0.5 MG: 1 INJECTION, SOLUTION INTRAMUSCULAR; INTRAVENOUS; SUBCUTANEOUS at 04:09

## 2024-09-13 RX ADMIN — TIMOLOL MALEATE 1 DROP: 5 SOLUTION/ DROPS OPHTHALMIC at 10:09

## 2024-09-13 RX ADMIN — BRIMONIDINE TARTRATE 1 DROP: 1.5 SOLUTION OPHTHALMIC at 04:09

## 2024-09-13 RX ADMIN — HYDROMORPHONE HYDROCHLORIDE 0.5 MG: 1 INJECTION, SOLUTION INTRAMUSCULAR; INTRAVENOUS; SUBCUTANEOUS at 10:09

## 2024-09-13 RX ADMIN — INSULIN ASPART 8 UNITS: 100 INJECTION, SOLUTION INTRAVENOUS; SUBCUTANEOUS at 05:09

## 2024-09-13 RX ADMIN — HYDROMORPHONE HYDROCHLORIDE 0.5 MG: 1 INJECTION, SOLUTION INTRAMUSCULAR; INTRAVENOUS; SUBCUTANEOUS at 09:09

## 2024-09-13 RX ADMIN — LIDOCAINE HYDROCHLORIDE 200 MG: 20 INJECTION, SOLUTION INTRAVENOUS at 11:09

## 2024-09-13 RX ADMIN — BRIMONIDINE TARTRATE 1 DROP: 1.5 SOLUTION OPHTHALMIC at 10:09

## 2024-09-13 RX ADMIN — DEXTROSE MONOHYDRATE: 100 INJECTION, SOLUTION INTRAVENOUS at 08:09

## 2024-09-13 RX ADMIN — APIXABAN 5 MG: 2.5 TABLET, FILM COATED ORAL at 08:09

## 2024-09-13 RX ADMIN — ATROPINE SULFATE 1 DROP: 10 SOLUTION OPHTHALMIC at 10:09

## 2024-09-13 RX ADMIN — SEVELAMER CARBONATE 800 MG: 800 TABLET, FILM COATED ORAL at 01:09

## 2024-09-13 RX ADMIN — LEVETIRACETAM 500 MG: 250 TABLET, FILM COATED ORAL at 08:09

## 2024-09-13 RX ADMIN — OXYCODONE HYDROCHLORIDE AND ACETAMINOPHEN 1 TABLET: 5; 325 TABLET ORAL at 08:09

## 2024-09-13 RX ADMIN — HYDROMORPHONE HYDROCHLORIDE 1 MG: 1 INJECTION, SOLUTION INTRAMUSCULAR; INTRAVENOUS; SUBCUTANEOUS at 04:09

## 2024-09-13 RX ADMIN — PROPOFOL 100 MG: 10 INJECTION, EMULSION INTRAVENOUS at 11:09

## 2024-09-13 RX ADMIN — INSULIN ASPART 4 UNITS: 100 INJECTION, SOLUTION INTRAVENOUS; SUBCUTANEOUS at 08:09

## 2024-09-13 RX ADMIN — SUCRALFATE 1 G: 1 TABLET ORAL at 01:09

## 2024-09-13 RX ADMIN — PROPOFOL 50 MG: 10 INJECTION, EMULSION INTRAVENOUS at 11:09

## 2024-09-13 RX ADMIN — DEXTROSE MONOHYDRATE 250 ML: 100 INJECTION, SOLUTION INTRAVENOUS at 04:09

## 2024-09-13 NOTE — ASSESSMENT & PLAN NOTE
Patient is identified as having Diastolic (HFpEF) heart failure that is Chronic. CHF is currently controlled. Latest ECHO performed and demonstrates- Results for orders placed during the hospital encounter of 07/16/24    Echo    Interpretation Summary    Left Ventricle: The left ventricle is normal in size. Normal wall thickness. There is normal systolic function with a visually estimated ejection fraction of 65 - 70%. There is normal diastolic function.    Right Ventricle: Normal right ventricular cavity size. Wall thickness is normal. Systolic function is normal.    Pericardium: There is a small effusion. No indication of cardiac tamponade. Evidence includes no chamber collapse, no respiratory chamber variation.  Will continue home hydralazine and monitor clinical status closely. Monitor on telemetry. Patient is off CHF pathway.  Monitor strict Is&Os and daily weights.  Place on fluid restriction of 1.5 L. Cardiology has not been consulted. Continue to stress to patient importance of self efficacy and  on diet for CHF.

## 2024-09-13 NOTE — ASSESSMENT & PLAN NOTE
Chronic, unclear etiology, most likely secondary to gastroparesis?.     CT reviewed shows questionable inflammatory gastritis.  We will ask GI evaluation    Pending EGD    Hold the insulin now   Start on D10 at 75 cc/hour while patient is NPO  Patient has type 1 diabetes/ brittle diabetes, her glucose can go up and down from 400 to 40s in the very short periods time.

## 2024-09-13 NOTE — NURSING
"Spot check POCT glucose obtained at 0209. 76. Patient is awake, alert and appropriately communicative. Due to drop since prior obtained glucose at 2247 and noted recently drops is glucose, contact is made with Hospital Medicine provider, WILL Salas DNP, at 0219 regarding the glucose level and noted drop. Reviewed all relevant, pertinent, and qualifying patient information with provider and noted glucose drops this admission. Also noted patient currently NPO for scheduled procedure, unable to encourage or have po intake at this time. Upon contacting provider and reviewing and providing all pertinent information, provider states to follow hypoglycemia protocol that is ordered without further directive or intervention at this time. Patient does not meet criteria for hypoglycemia intervention at this time. At approximately 0250, provider on unit and asks if patient symptomatic and which provider notified that patient is asymptomatic of hypoglycemia. Provider sees patient and agrees. Notified provider of glucose monitoring as ordered and provider states "don't check the blood sugar again until before breakfast as ordered. It will get checked with lab draw this morning."   "

## 2024-09-13 NOTE — [employer] PATIENT INSTRUCTIONS
Discharge Summary/Instructions after an Endoscopic Procedure  Patient Name: Tabby Howard  Patient MRN: [id number]  Patient YOB: 1990 Friday, September 13, 2024  Marcelo Aponte MD  Dear patient,  As a result of recent federal legislation (The Federal Cures Act), you may   receive lab or pathology results from your procedure in your [facility]   account before your physician is able to contact you. Your physician or   their representative will relay the results to you with their   recommendations at their soonest availability.  Thank you,  RESTRICTIONS:  During your procedure today, you received medications for sedation.  These   medications may affect your judgment, balance and coordination.  Therefore,   for 24 hours, you have the following restrictions:   - DO NOT drive a car, operate machinery, make legal/financial decisions,   sign important papers or drink alcohol.    ACTIVITY:  Today: no heavy lifting, straining or running due to procedural   sedation/anesthesia.  The following day: return to full activity including work.  DIET:  Eat and drink normally unless instructed otherwise.     TREATMENT FOR COMMON SIDE EFFECTS:  - Mild abdominal pain, nausea, belching, bloating or excessive gas:  rest,   eat lightly and use a heating pad.  - Sore Throat: treat with throat lozenges and/or gargle with warm salt   water.  - Because air was used during the procedure, expelling large amounts of air   from your rectum or belching is normal.  - If a bowel prep was taken, you may not have a bowel movement for 1-3 days.    This is normal.  SYMPTOMS TO WATCH FOR AND REPORT TO YOUR PHYSICIAN:  1. Abdominal pain or bloating, other than gas cramps.  2. Chest pain.  3. Back pain.  4. Signs of infection such as: chills or fever occurring within 24 hours   after the procedure.  5. Rectal bleeding, which would show as bright red, maroon, or black stools.   (A tablespoon of blood from the rectum is not serious, especially  if   hemorrhoids are present.)  6. Vomiting.  7. Weakness or dizziness.  GO DIRECTLY TO THE NEAREST EMERGENCY ROOM IF YOU HAVE ANY OF THE FOLLOWING:      Difficulty breathing              Chills and/or fever over 101 F   Persistent vomiting and/or vomiting blood   Severe abdominal pain   Severe chest pain   Black, tarry stools   Bleeding- more than one tablespoon   Any other symptom or condition that you feel may need urgent attention  Your doctor recommends these additional instructions:  If any biopsies were taken, your doctors clinic will contact you in 1 to 2   weeks with any results.  - Resume previous diet.   - Continue present medications.   - No aspirin, ibuprofen, naproxen, or other non-steroidal anti-inflammatory   drugs.   - Await pathology results.   - Discharge patient to home (ambulatory).   - Follow an antireflux regimen.   - Return patient to hospital [facility] for ongoing care.  For questions, problems or results please call your physician - Marcelo Aponte MD at Work:  [phone].  [facility] [place], EMERGENCY ROOM PHONE NUMBER: [phone]  IF A COMPLICATION OR EMERGENCY SITUATION ARISES AND YOU ARE UNABLE TO REACH   YOUR PHYSICIAN - GO DIRECTLY TO THE EMERGENCY ROOM.  Marcelo Aponte MD  9/13/2024 11:30:25 AM  This report has been verified and signed electronically.  Dear patient,  As a result of recent federal legislation (The Federal Cures Act), you may   receive lab or pathology results from your procedure in your [facility]   account before your physician is able to contact you. Your physician or   their representative will relay the results to you with their   recommendations at their soonest availability.  Thank you,  [employer]

## 2024-09-13 NOTE — NURSING
At [id number] and upon receiving report on patient, noted laboratory tech at bedside obtaining ordered serum glucose. Upon assuming care of patient, oral intake is encouraged. Patient follows direction and encouragement and oral intake by patient noted. Juice provided, as well as soup warmed and ice cream provided. Upon noting serum glucose results of 61, POCT glucose assessed x2 and noted to be WDL. Re-ordered serum glucose and continued oral intake is noted. Serum glucose later results 79. Oral fluids remain at bedside and oral intake continues to be encouraged.

## 2024-09-13 NOTE — PROGRESS NOTES
[facility] Medicine  Progress Note    Patient Name: Tabby Howard  MRN: [id number]  Patient Class: IP- Inpatient   Admission Date: 9/10/2024  Length of Stay: 0 days  Attending Physician: Oneyda Hinds MD  Primary Care Provider: Melony Kim NP        Subjective:     Principal Problem:High anion gap metabolic acidosis        HPI:  Tabby Howard is a 34 year old female with a previous medical history of ESRD on dialysis Tue/Thur/Sat, chronic abdominal pain, HTN, Type II Diabetes, who presented to the ED  for left-sided flank pain. HPI is limited to chart review and ED note due to patient somnolence.  Per chart review, patient has multiple similar episodes in the past and has had multiple previous CT scans performed.  Also has had multiple hospitalizations for DKA in the past.  Patient had dialysis earlier today, and following this, she had bilateral flank pain.  Left was worse than right.  She states that she has been taking her long-acting insulin.  She initially presented to [facility].  She had some lab work performed there which did show an anion gap with hyperglycemia that was elevated.  At the outside facility, she repeatedly requested opioid analgesia which was not indicated at that time.  Patient then stated that she would leave and go to another hospital.  [facility] and came to [facility].  Upon arrival, complaining of bilateral with left-greater-than-right flank pain.  States that she has been using her insulin although she has a long history of insulin noncompliance. Patient administered 2mg of ativan and 12.5 of IV phenergan in ED with alleviation of her pain symptoms. She was initiated on an insulin drip for DKA and admitted by hospital medicine for further evaluation and management.     Overview/Hospital Course:  34-year-old female with a history of type 1 diabetes, medical noncompliance, gastroparesis, ESRD, presenting here for persistent left  flank pain, metabolic acidosis.  Initially feel like to secondary to DKA however it is not DKA, most likely the patient has a high anion gap metabolic acidosis secondary to ESRD/ Diamox.  CTA abdomen and pelvis showed ?  Gastric wall thickening, GI was consulted, pending for EGD.  Patient has developed hypoglycemia while NPO.   Review of the chart, patient has frequent hospitalization for abdominal pain that ask for IV Dilaudid very frequently.     Interval History:   Seen and examined at multidisciplinary rounds, patient is awake alert, AAO times, still complaining of left flank pain ask for IV Dilaudid.  Hypoglycemia overnight due to NPO, started on D10 at 75 cc/hour.  Pending EGD.         Review of Systems   Gastrointestinal:  Positive for abdominal pain.     Objective:     Vital Signs (Most Recent):  Temp: 97.7 °F (36.5 °C) (09/13/24 1205)  Pulse: 74 (09/13/24 1205)  Resp: 18 (09/13/24 1205)  BP: (!) 151/68 (09/13/24 1205)  SpO2: 96 % (09/13/24 1205) Vital Signs (24h Range):  Temp:  [97.7 °F (36.5 °C)-98.6 °F (37 °C)] 97.7 °F (36.5 °C)  Pulse:  [71-83] 74  Resp:  [16-20] 18  SpO2:  [94 %-100 %] 96 %  BP: (110-164)/(58-98) 151/68     Weight: 52.7 kg (116 lb 2.9 oz)  Body mass index is 21.25 kg/m².    Intake/Output Summary (Last 24 hours) at 9/13/2024 1215  Last data filed at 9/13/2024 0619  Gross per 24 hour   Intake 1646.26 ml   Output 4000 ml   Net -2353.74 ml         Physical Exam  Constitutional:       Appearance: She is ill-appearing.   HENT:      Head: Normocephalic and atraumatic.      Nose: Nose normal.   Eyes:      Extraocular Movements: Extraocular movements intact.      Pupils: Pupils are equal, round, and reactive to light.   Cardiovascular:      Rate and Rhythm: Normal rate and regular rhythm.      Heart sounds: No murmur heard.  Pulmonary:      Effort: Pulmonary effort is normal.      Breath sounds: Normal breath sounds.   Abdominal:      General: Abdomen is flat.      Palpations: Abdomen is soft.  "     Tenderness: There is abdominal tenderness.   Musculoskeletal:         General: Normal range of motion.      Cervical back: Normal range of motion and neck supple.   Skin:     General: Skin is warm and dry.   Neurological:      General: No focal deficit present.      Mental Status: She is alert.      Comments: Lethargic   Psychiatric:         Mood and Affect: Mood normal.             Significant Labs: All pertinent labs within the past 24 hours have been reviewed.  CBC:   No results for input(s): "WBC", "HGB", "HCT", "PLT" in the last 48 hours.    CMP:   Recent Labs   Lab 09/12/24  0436 09/12/24  1910 09/12/24 2015 09/13/24  0410   *  --   --  138   K 4.5  --   --  3.7     --   --  101   CO2 21*  --   --  25   * 61*  61* 79 43*   BUN 25*  --   --  15   CREATININE 7.5*  --   --  4.9*   CALCIUM 8.7  --   --  9.5   ANIONGAP 13  --   --  12       Significant Imaging: I have reviewed all pertinent imaging results/findings within the past 24 hours.  I have reviewed and interpreted all pertinent imaging results/findings within the past 24 hours.    Scheduled Meds:   atropine 1%  1 drop Left Eye BID    brimonidine 0.15 % OPTH DROP  1 drop Both Eyes TID    dorzolamide-timolol 2-0.5%  1 drop Left Eye Q12H    epoetin teresa-epbx  100 Units/kg Intravenous Every Tues, Thurs, Sat    FLUoxetine  40 mg Oral Daily    levETIRAcetam  500 mg Oral BID    pantoprazole  40 mg Intravenous Daily    sevelamer carbonate  800 mg Oral TID WM    sucralfate  1 g Oral QID    timolol maleate 0.5%  1 drop Left Eye BID     Continuous Infusions:   0.9% NaCl   Intravenous Continuous        D10W   Intravenous Continuous 75 mL/hr at 09/13/24 0822 New Bag at 09/13/24 0822     PRN Meds:.  Current Facility-Administered Medications:     acetaminophen, 650 mg, Oral, Q4H PRN    albuterol-ipratropium, 3 mL, Nebulization, Q6H PRN    busPIRone, 10 mg, Oral, TID PRN    dextrose 10%, 12.5 g, Intravenous, PRN    dextrose 10%, 25 g, " Intravenous, PRN    diphenhydrAMINE, 25 mg, Oral, Q6H PRN    glucagon (human recombinant), 1 mg, Intramuscular, PRN    glucose, 16 g, Oral, PRN    glucose, 24 g, Oral, PRN    HYDROmorphone, 0.5 mg, Intravenous, Q6H PRN    insulin aspart U-100, 0-10 Units, Subcutaneous, QID (AC + HS) PRN    ondansetron, 4 mg, Intravenous, Q6H PRN    promethazine (PHENERGAN) 12.5 mg in 0.9% NaCl 50 mL IVPB, 12.5 mg, Intravenous, Q6H PRN    sodium chloride 0.9%, 10 mL, Intravenous, PRN    sodium chloride 0.9%, 10 mL, Intravenous, PRN      CT Abdomen Pelvis  Without Contrast    Result Date: 9/11/2024  EXAMINATION: CT ABDOMEN PELVIS WITHOUT CONTRAST CLINICAL HISTORY: Abdominal pain, acute, nonlocalized; TECHNIQUE: Low dose axial images, sagittal and coronal reformations were obtained from the lung bases to the pubic symphysis.  Oral contrast was not administered. COMPARISON: Multiple priors, most recent 08/28/2024 FINDINGS: Mild cardiomegaly, unchanged.  Small pericardial effusion measuring simple fluid attenuation, decreased compared to prior.  Basilar airspace opacities improved. Please note in the absence of IV contra valuation for solid organ mass is limited. Liver is normal in morphology and with smooth contour.  Gallbladder surgically absent.  No intra or extrahepatic biliary ductal dilatation. Atrophic kidneys.  No hydronephrosis. The spleen adrenal glands and pancreas are normal. Gastric wall thickening and edema.  The abdominal aorta is normal in caliber with advanced calcification for patient's age including of the mesenteric and splenic arteries. No enlarged retroperitoneal lymph nodes. Normal appendix.  No bowel obstruction or inflammatory change.  No mid or free air. Circumferential bladder wall thickening.  Uterus and are unremarkable.  No enlarged pelvic lymph. Lower anterior abdominal subcutaneous edema similar prior, possibly from subcutaneous injections. No acute or aggressive osseous abnormality.     Findings  concerning for infectious/inflammatory gastritis. Thick-walled bladder as may be seen in the setting of cystitis.  Correlate with urinalysis. Improved pericardial effusion and basilar opacities. Electronically signed by: Tayler Squires Date:    09/11/2024 Time:    11:22    X-Ray Chest AP Portable    Result Date: 8/28/2024  EXAMINATION: XR CHEST AP PORTABLE CLINICAL HISTORY: End stage renal disease TECHNIQUE: Single frontal view of the chest was performed. COMPARISON: 08/22/2024. FINDINGS: Stable cardiomegaly. Heart and lungs  appear unchanged when allowing for differences in technique and positioning.     Stable cardiomegaly. No significant change from prior study. Electronically signed by: Sandeep Brower MD Date:    08/28/2024 Time:    21:42    CT Renal Stone Study ABD Pelvis WO    Result Date: 8/28/2024  EXAMINATION: CT RENAL STONE STUDY ABD PELVIS WO CLINICAL HISTORY: Flank pain, kidney stone suspected; TECHNIQUE: Low dose axial images, sagittal and coronal reformations were obtained from the lung bases to the pubic symphysis.  Contrast was not administered. COMPARISON: 08/20/2024. FINDINGS: Heart: Cardiomegaly with moderate pericardial effusion, similar compared to prior. Lung Bases: Increased vascular congestion at the lung bases with central perivascular edema.  No pleural effusions. Liver: Normal in size and attenuation, with no focal hepatic lesions. Gallbladder: Surgically absent. Bile Ducts: No evidence of dilated ducts. Pancreas: Unchanged from prior. Spleen: Unchanged from prior. Adrenals: Unremarkable. Kidneys/ Ureters: Bilateral renal atrophy.  Extensive renal vascular calcifications.  No renal calculi or hydronephrosis. Bladder: Diffuse bladder wall thickening, similar to prior. Reproductive organs: Right adnexal enlargement, nonspecific, similar compared to recent prior.  Extensive vascular calcifications in the adnexa. GI Tract/Mesentery: No evidence of bowel obstruction.  Bowel loops appears similar  to prior. Peritoneal Space: No ascites. No free air. Retroperitoneum: Enlarged left periaortic lymph nodes, unchanged from recent prior. Abdominal wall: Anasarca, similar to prior.  Postsurgical changes abdominal wall, similar to prior. Vasculature: Extensive vascular calcifications present, similar compared to prior study.  No abdominal aortic aneurysm. Bones: No acute fracture.     Cardiomegaly with moderate pericardial effusion, similar to prior.  Interval development of vascular congestion and perivascular edema at the lung bases. Anasarca, similar to prior. Bilateral renal atrophy.  No renal/ureteral calculi or hydroureteronephrosis. Diffuse bladder wall thickening, nonspecific, unchanged from prior. Left periaortic lymph node enlargement, unchanged from recent prior. Right adnexal enlargement, nonspecific, unchanged from recent prior. Extensive vascular calcifications, unchanged from recent prior. Additional findings as above. Electronically signed by: Sandeep Brower MD Date:    08/28/2024 Time:    21:12    CT Abdomen Pelvis  Without Contrast    Result Date: 8/22/2024  EXAMINATION: CT ABDOMEN PELVIS WITHOUT CONTRAST CLINICAL HISTORY: Abdominal pain, acute, nonlocalized; TECHNIQUE: Low dose axial images, sagittal and coronal reformations were obtained from the lung bases to the pubic symphysis without IV contrast.  Oral contrast was not administered. COMPARISON: CT 08/20/2024 FINDINGS: There is mild mosaic alteration of the visualized lung parenchyma.  There are scattered bandlike opacities suggesting atelectasis or scarring.  No significant pleural fluid.  Heart is enlarged.  There is a moderate to large pericardial effusion. Liver is mildly enlarged.  No focal hepatic abnormality on this limited noncontrast study.  The gallbladder is surgically absent.  There is no intra-or extrahepatic biliary ductal dilatation. Stomach appears within normal limits.  Pancreas and adrenal glands are unremarkable for  noncontrast technique.  Spleen is mildly enlarged, similar to prior examination. Bilateral native kidneys are atrophic with prominent vascular calcifications.  There is no significant hydronephrosis.  Urinary bladder demonstrates nonspecific circumferential wall thickening.  Noncontrast appearance of the uterus is unremarkable.  Stable appearance of the bilateral adnexa.  No significant free fluid in the pelvis. The abdominal aorta is normal in course and caliber extensive calcification of the abdominal aorta and visceral branch vessels.  There is no retroperitoneal hematoma. There are mildly prominent fluid-filled loops of small bowel throughout the abdomen and pelvis without discrete transition point which may reflect a nonspecific enteritis.  There is a moderate volume of retained stool throughout the colon suggesting constipation.  No CT evidence of acute appendicitis.  There is no ascites, portal venous gas, or free intraperitoneal air.  There is a small fat containing umbilical hernia.  There are scattered shotty mesenteric lymph nodes.  There are mildly prominent retroperitoneal lymph nodes, similar to prior study. Osseous structures demonstrate no acute fracture.  There is mild diffuse body wall edema.  There is a somewhat more focal region of soft tissue induration within the left paramidline lower abdominal wall possibly a prior injection site or hematoma.  Clinical correlation with physical examination advised.     Atrophic bilateral native kidneys.  No evidence of hydronephrosis. Mild nonspecific urinary bladder wall thickening.  Correlation with urinalysis advised. Moderate to large volume pericardial effusion.  Cardiomegaly. Mildly prominent fluid-filled loops of small bowel without discrete transition point which may reflect a nonspecific enteritis. Mild hepatosplenomegaly. Retained stool throughout the colon suggesting constipation. Additional findings as above. Electronically signed by: Kaushik  MD Johnathon Date:    08/22/2024 Time:    20:35    X-Ray Chest AP Portable    Result Date: 8/22/2024  EXAMINATION: XR CHEST AP PORTABLE CLINICAL HISTORY: Dorsalgia, unspecified TECHNIQUE: Portable view of the chest was performed. COMPARISON: 08/20/2024. FINDINGS: Lungs are clear.  No focal consolidation.  Heart size is enlarged.  Mediastinal contours unremarkable.  Trachea midline. Bony thorax intact.     Cardiomegaly. Electronically signed by: Varun Trejo Date:    08/22/2024 Time:    06:34    CT Renal Stone Study ABD Pelvis WO    Result Date: 8/20/2024  EXAMINATION: CT RENAL STONE STUDY ABD PELVIS WO CLINICAL HISTORY: Flank pain, kidney stone suspected;left; TECHNIQUE: Low dose axial images, sagittal and coronal reformations were obtained from the lung bases to the pubic symphysis.  Contrast was not administered. COMPARISON: July 16, 2024 FINDINGS: Mild bibasal atelectasis is evident.  There is moderate-sized pericardial effusion.  There is evidence of cardiomegaly and anasarca.  Extensive vascular calcifications are present.  There is evidence of prior cholecystectomy.  There is evidence of renal atrophy bilaterally.  There is an enlarged left periaortic lymph node measuring 13 mm in small stone mentions.  This is nonspecific but is similar to the prior examination.  There is a right adnexal mass which is chronic and may represent a ovarian mass or uterine fibroid.  This area measures 5 x 3.7 cm and is unchanged relative to 2023 a few sigmoid diverticula are seen.  I do not see CT evidence of diverticulitis.  There is no evidence of nephrolithiasis, hydronephrosis or hydroureter.  There is no evidence of bowel obstruction.     No evidence of nephrolithiasis, hydronephrosis or hydroureter. Bladder wall thickening which is nonspecific and evidence of bilateral renal atrophy. Cardiomegaly anasarca and evidence of prior cholecystectomy are again noted. Stable left periaortic enlarged lymph node. Limited evaluation of  the colon and parenchyma. Right adnexal mass which is chronic and could represent a uterine fibroid but was not confidently seen on prior ultrasound. Bibasal atelectasis Electronically signed by: Thanh Davila MD Date:    08/20/2024 Time:    09:03    X-Ray Chest AP Portable    Result Date: 8/20/2024  EXAMINATION: XR CHEST AP PORTABLE CLINICAL HISTORY: Cough, unspecified TECHNIQUE: Portable view of the chest was performed. COMPARISON: 07/18/2024. FINDINGS: Lungs are clear.  No focal consolidation.  Heart size is enlarged.  Mediastinal contours unremarkable.  Trachea midline. Bony thorax intact.     Cardiomegaly. Electronically signed by: Varun Trejo Date:    08/20/2024 Time:    08:46  - pulls last radiology orders      Assessment/Plan:      * High anion gap metabolic acidosis  Patient has high anion gap metabolic acidosis upon admission, beta hydroxybutyrate negative, most likely secondary to ESRD, and also patient taking Diamox at home- unclear etiology.  Patient does not have DKA, no nausea vomiting diarrhea.   Patient has long history of gastroparesis, not candidate for Reglan- patient has significant QTC prolongation .   Start diabetic renal diet.  Resume insulin sliding scale  Discontinue Diamox, unclear why she is on this medication.            Abdominal pain    Chronic, unclear etiology, most likely secondary to gastroparesis?.     CT reviewed shows questionable inflammatory gastritis.  We will ask GI evaluation    Pending EGD    Hold the insulin now   Start on D10 at 75 cc/hour while patient is NPO  Patient has type 1 diabetes/ brittle diabetes, her glucose can go up and down from 400 to 40s in the very short periods time.       ESRD (end stage renal disease) on dialysis  Last Dialysis on 9/10/24.    -IP consult to nephrology for dialysis orders    Diabetes  Patient's FSGs are uncontrolled due to hyperglycemia on current medication regimen.  Last A1c reviewed-   Lab Results   Component Value Date     HGBA1C 9.0 (H) 09/10/2024     Most recent fingerstick glucose reviewed-   Recent Labs   Lab 09/11/24  0436 09/11/24  0533 09/11/24  0628 09/11/24  0737   POCTGLUCOSE 154* 178* 183* 195*     Current correctional scale  Medium  Maintain anti-hyperglycemic dose as follows-   Antihyperglycemics (From admission, onward)      Start     Stop Route Frequency Ordered    09/11/24 2100  insulin glargine U-100 (Lantus) pen 15 Units         -- SubQ Nightly 09/11/24 1030    09/11/24 1200  insulin aspart U-100 pen 8 Units         -- SubQ 3 times daily with meals 09/11/24 1030    09/11/24 0856  insulin aspart U-100 pen 0-10 Units         -- SubQ Before meals & nightly PRN 09/11/24 0757          Hold Oral hypoglycemics while patient is in the hospital.    QT prolongation      Avoid medications does prolonged QTC    Hypertension  Chronic, uncontrolled. Latest blood pressure and vitals reviewed-     Temp:  [97.9 °F (36.6 °C)-99.3 °F (37.4 °C)]   Pulse:  []   Resp:  [19-22]   BP: (162-224)/()   SpO2:  [97 %-100 %] .   Home meds for hypertension were reviewed and noted below.   Hypertension Medications               hydrALAZINE (APRESOLINE) 50 MG tablet Take 1 tablet (50 mg total) by mouth every 8 (eight) hours.            While in the hospital, will manage blood pressure as follows; Adjust home antihypertensive regimen as follows- Continue home medications and use PRN IV hydralazine     Will utilize p.r.n. blood pressure medication only if patient's blood pressure greater than 180/110 and she develops symptoms such as worsening chest pain or shortness of breath.    Anemia in ESRD (end-stage renal disease)  Anemia is likely due to chronic disease due to ESRD. Most recent hemoglobin and hematocrit are listed below.  Recent Labs     09/08/24  1235 09/10/24  1255 09/10/24  1745   HGB 9.2* 10.3* 9.8*   HCT 27.7* 30.5* 28.2*     Plan  - Monitor serial CBC: Daily  - Transfuse PRBC if patient becomes hemodynamically  unstable, symptomatic or H/H drops below 7/21.  - Patient has not received any PRBC transfusions to date  - Patient's anemia is currently stable      Chronic congestive heart failure with left ventricular diastolic dysfunction  Patient is identified as having Diastolic (HFpEF) heart failure that is Chronic. CHF is currently controlled. Latest ECHO performed and demonstrates- Results for orders placed during the hospital encounter of 07/16/24    Echo    Interpretation Summary    Left Ventricle: The left ventricle is normal in size. Normal wall thickness. There is normal systolic function with a visually estimated ejection fraction of 65 - 70%. There is normal diastolic function.    Right Ventricle: Normal right ventricular cavity size. Wall thickness is normal. Systolic function is normal.    Pericardium: There is a small effusion. No indication of cardiac tamponade. Evidence includes no chamber collapse, no respiratory chamber variation.  Will continue home hydralazine and monitor clinical status closely. Monitor on telemetry. Patient is off CHF pathway.  Monitor strict Is&Os and daily weights.  Place on fluid restriction of 1.5 L. Cardiology has not been consulted. Continue to stress to patient importance of self efficacy and  on diet for CHF.         Drug-seeking behavior    Persistent asking for IV Ativan/ narcotics    Gastroparesis - suspected, unconfirmed  -IV ativan 1mg Q 4 hours  -IV phenergan 12mg Q 6 hours PRN nausea          VTE Risk Mitigation (From admission, onward)           Ordered     IP VTE HIGH RISK PATIENT  Once         09/10/24 2050     Place JOCELYNE hose  Until discontinued         09/10/24 2050     Place sequential compression device  Until discontinued         09/10/24 2050     Reason for No Pharmacological VTE Prophylaxis  Once        Question:  Reasons:  Answer:  Already adequately anticoagulated on oral Anticoagulants    09/10/24 2050     Place sequential compression device  Until  discontinued         09/10/24 1908                    Discharge Planning   TATIANA: 9/15/2024     Code Status: Full Code   Is the patient medically ready for discharge?:     Reason for patient still in hospital (select all that apply): Patient trending condition and Treatment  Discharge Plan A: Home with family                  Oneyda Hinds MD  Department of Hospital Medicine   [facility]

## 2024-09-13 NOTE — HOSPITAL COURSE
34-year-old female with frequent admissions, medication non compliance and narcotic seeking and history of type 1 diabetes, gastroparesis, ESRD, presenting here for persistent left flank pain, metabolic acidosis.  Initially feel like to secondary to DKA however it is not DKA, most likely the patient has a high anion gap metabolic acidosis secondary to ESRD/ Diamox.  CTA abdomen and pelvis showed ?  Gastric wall thickening, GI was consulted, Patient underwent EGD which was negative and GI signed off.  Patient developed hypoglycemia while NPO and started on D5. Her glucose is better today and Lantus was resumed.   Review of the chart, patient has frequent hospitalization for abdominal pain that ask for IV Dilaudid very frequently. Patient got dialysis today, she is saying she is in pain. However I have explained that there is nothing on her imaging to suggest pain in her kidneys. Patient was discharged after dialysis today. However she will likely be back again due to noncompliance and pain med seeking behavior.

## 2024-09-13 NOTE — NURSING
B/p 188/81 pulse 85, message sent to Dr Hinds, new order noted for po hydralazine, pt made aware, medication given as rx'd, care ongoing

## 2024-09-13 NOTE — NURSING
Pt crying out c/o ABD pain, Dr Hinds on unit and notified, VRBO for dilaudid 0.5mg IV [id number] PRN given, pt made aware, care ongoing

## 2024-09-13 NOTE — ANESTHESIA POSTPROCEDURE EVALUATION
Anesthesia Post Evaluation    Patient: Tabby Howard    Procedure(s) Performed: Procedure(s) (LRB):  EGD (ESOPHAGOGASTRODUODENOSCOPY) (N/A)    Final Anesthesia Type: general      Patient location during evaluation: PACU  Patient participation: Yes- Able to Participate  Level of consciousness: awake and alert  Post-procedure vital signs: reviewed and stable  Pain management: adequate  Airway patency: patent    PONV status at discharge: No PONV  Anesthetic complications: no      Cardiovascular status: blood pressure returned to baseline  Respiratory status: unassisted  Hydration status: euvolemic  Follow-up not needed.              Vitals Value Taken Time   /61 09/13/24 1150   Temp   09/13/24 1202   Pulse 73 09/13/24 1150   Resp 16 09/13/24 1150   SpO2 99 % 09/13/24 1150         No case tracking events are documented in the log.      Pain/Lien Score: Pain Rating Prior to Med Admin: 10 (9/13/2024  9:56 AM)  Pain Rating Post Med Admin: 3 (9/13/2024  5:23 AM)  Lien Score: 9 (9/13/2024 11:50 AM)

## 2024-09-13 NOTE — SUBJECTIVE & OBJECTIVE
Interval History:   Seen and examined at multidisciplinary rounds, patient is awake alert, AAO times, still complaining of left flank pain ask for IV Dilaudid.  Hypoglycemia overnight due to NPO, started on D10 at 75 cc/hour.  Pending EGD.         Review of Systems   Gastrointestinal:  Positive for abdominal pain.     Objective:     Vital Signs (Most Recent):  Temp: 97.7 °F (36.5 °C) (09/13/24 1205)  Pulse: 74 (09/13/24 1205)  Resp: 18 (09/13/24 1205)  BP: (!) 151/68 (09/13/24 1205)  SpO2: 96 % (09/13/24 1205) Vital Signs (24h Range):  Temp:  [97.7 °F (36.5 °C)-98.6 °F (37 °C)] 97.7 °F (36.5 °C)  Pulse:  [71-83] 74  Resp:  [16-20] 18  SpO2:  [94 %-100 %] 96 %  BP: (110-164)/(58-98) 151/68     Weight: 52.7 kg (116 lb 2.9 oz)  Body mass index is 21.25 kg/m².    Intake/Output Summary (Last 24 hours) at 9/13/2024 1215  Last data filed at 9/13/2024 0619  Gross per 24 hour   Intake 1646.26 ml   Output 4000 ml   Net -2353.74 ml         Physical Exam  Constitutional:       Appearance: She is ill-appearing.   HENT:      Head: Normocephalic and atraumatic.      Nose: Nose normal.   Eyes:      Extraocular Movements: Extraocular movements intact.      Pupils: Pupils are equal, round, and reactive to light.   Cardiovascular:      Rate and Rhythm: Normal rate and regular rhythm.      Heart sounds: No murmur heard.  Pulmonary:      Effort: Pulmonary effort is normal.      Breath sounds: Normal breath sounds.   Abdominal:      General: Abdomen is flat.      Palpations: Abdomen is soft.      Tenderness: There is abdominal tenderness.   Musculoskeletal:         General: Normal range of motion.      Cervical back: Normal range of motion and neck supple.   Skin:     General: Skin is warm and dry.   Neurological:      General: No focal deficit present.      Mental Status: She is alert.      Comments: Lethargic   Psychiatric:         Mood and Affect: Mood normal.             Significant Labs: All pertinent labs within the past 24 hours  "have been reviewed.  CBC:   No results for input(s): "WBC", "HGB", "HCT", "PLT" in the last 48 hours.    CMP:   Recent Labs   Lab 09/12/24  0436 09/12/24  1910 09/12/24 2015 09/13/24  0410   *  --   --  138   K 4.5  --   --  3.7     --   --  101   CO2 21*  --   --  25   * 61*  61* 79 43*   BUN 25*  --   --  15   CREATININE 7.5*  --   --  4.9*   CALCIUM 8.7  --   --  9.5   ANIONGAP 13  --   --  12       Significant Imaging: I have reviewed all pertinent imaging results/findings within the past 24 hours.  I have reviewed and interpreted all pertinent imaging results/findings within the past 24 hours.    Scheduled Meds:   atropine 1%  1 drop Left Eye BID    brimonidine 0.15 % OPTH DROP  1 drop Both Eyes TID    dorzolamide-timolol 2-0.5%  1 drop Left Eye Q12H    epoetin teresa-epbx  100 Units/kg Intravenous Every Tues, Thurs, Sat    FLUoxetine  40 mg Oral Daily    levETIRAcetam  500 mg Oral BID    pantoprazole  40 mg Intravenous Daily    sevelamer carbonate  800 mg Oral TID WM    sucralfate  1 g Oral QID    timolol maleate 0.5%  1 drop Left Eye BID     Continuous Infusions:   0.9% NaCl   Intravenous Continuous        D10W   Intravenous Continuous 75 mL/hr at 09/13/24 0822 New Bag at 09/13/24 0822     PRN Meds:.  Current Facility-Administered Medications:     acetaminophen, 650 mg, Oral, Q4H PRN    albuterol-ipratropium, 3 mL, Nebulization, Q6H PRN    busPIRone, 10 mg, Oral, TID PRN    dextrose 10%, 12.5 g, Intravenous, PRN    dextrose 10%, 25 g, Intravenous, PRN    diphenhydrAMINE, 25 mg, Oral, Q6H PRN    glucagon (human recombinant), 1 mg, Intramuscular, PRN    glucose, 16 g, Oral, PRN    glucose, 24 g, Oral, PRN    HYDROmorphone, 0.5 mg, Intravenous, Q6H PRN    insulin aspart U-100, 0-10 Units, Subcutaneous, QID (AC + HS) PRN    ondansetron, 4 mg, Intravenous, Q6H PRN    promethazine (PHENERGAN) 12.5 mg in 0.9% NaCl 50 mL IVPB, 12.5 mg, Intravenous, Q6H PRN    sodium chloride 0.9%, 10 mL, " Intravenous, PRN    sodium chloride 0.9%, 10 mL, Intravenous, PRN      CT Abdomen Pelvis  Without Contrast    Result Date: 9/11/2024  EXAMINATION: CT ABDOMEN PELVIS WITHOUT CONTRAST CLINICAL HISTORY: Abdominal pain, acute, nonlocalized; TECHNIQUE: Low dose axial images, sagittal and coronal reformations were obtained from the lung bases to the pubic symphysis.  Oral contrast was not administered. COMPARISON: Multiple priors, most recent 08/28/2024 FINDINGS: Mild cardiomegaly, unchanged.  Small pericardial effusion measuring simple fluid attenuation, decreased compared to prior.  Basilar airspace opacities improved. Please note in the absence of IV contra valuation for solid organ mass is limited. Liver is normal in morphology and with smooth contour.  Gallbladder surgically absent.  No intra or extrahepatic biliary ductal dilatation. Atrophic kidneys.  No hydronephrosis. The spleen adrenal glands and pancreas are normal. Gastric wall thickening and edema.  The abdominal aorta is normal in caliber with advanced calcification for patient's age including of the mesenteric and splenic arteries. No enlarged retroperitoneal lymph nodes. Normal appendix.  No bowel obstruction or inflammatory change.  No mid or free air. Circumferential bladder wall thickening.  Uterus and are unremarkable.  No enlarged pelvic lymph. Lower anterior abdominal subcutaneous edema similar prior, possibly from subcutaneous injections. No acute or aggressive osseous abnormality.     Findings concerning for infectious/inflammatory gastritis. Thick-walled bladder as may be seen in the setting of cystitis.  Correlate with urinalysis. Improved pericardial effusion and basilar opacities. Electronically signed by: Tayler Squires Date:    09/11/2024 Time:    11:22    X-Ray Chest AP Portable    Result Date: 8/28/2024  EXAMINATION: XR CHEST AP PORTABLE CLINICAL HISTORY: End stage renal disease TECHNIQUE: Single frontal view of the chest was performed.  COMPARISON: 08/22/2024. FINDINGS: Stable cardiomegaly. Heart and lungs  appear unchanged when allowing for differences in technique and positioning.     Stable cardiomegaly. No significant change from prior study. Electronically signed by: Sandeep Brower MD Date:    08/28/2024 Time:    21:42    CT Renal Stone Study ABD Pelvis WO    Result Date: 8/28/2024  EXAMINATION: CT RENAL STONE STUDY ABD PELVIS WO CLINICAL HISTORY: Flank pain, kidney stone suspected; TECHNIQUE: Low dose axial images, sagittal and coronal reformations were obtained from the lung bases to the pubic symphysis.  Contrast was not administered. COMPARISON: 08/20/2024. FINDINGS: Heart: Cardiomegaly with moderate pericardial effusion, similar compared to prior. Lung Bases: Increased vascular congestion at the lung bases with central perivascular edema.  No pleural effusions. Liver: Normal in size and attenuation, with no focal hepatic lesions. Gallbladder: Surgically absent. Bile Ducts: No evidence of dilated ducts. Pancreas: Unchanged from prior. Spleen: Unchanged from prior. Adrenals: Unremarkable. Kidneys/ Ureters: Bilateral renal atrophy.  Extensive renal vascular calcifications.  No renal calculi or hydronephrosis. Bladder: Diffuse bladder wall thickening, similar to prior. Reproductive organs: Right adnexal enlargement, nonspecific, similar compared to recent prior.  Extensive vascular calcifications in the adnexa. GI Tract/Mesentery: No evidence of bowel obstruction.  Bowel loops appears similar to prior. Peritoneal Space: No ascites. No free air. Retroperitoneum: Enlarged left periaortic lymph nodes, unchanged from recent prior. Abdominal wall: Anasarca, similar to prior.  Postsurgical changes abdominal wall, similar to prior. Vasculature: Extensive vascular calcifications present, similar compared to prior study.  No abdominal aortic aneurysm. Bones: No acute fracture.     Cardiomegaly with moderate pericardial effusion, similar to prior.   Interval development of vascular congestion and perivascular edema at the lung bases. Anasarca, similar to prior. Bilateral renal atrophy.  No renal/ureteral calculi or hydroureteronephrosis. Diffuse bladder wall thickening, nonspecific, unchanged from prior. Left periaortic lymph node enlargement, unchanged from recent prior. Right adnexal enlargement, nonspecific, unchanged from recent prior. Extensive vascular calcifications, unchanged from recent prior. Additional findings as above. Electronically signed by: Sandeep Brower MD Date:    08/28/2024 Time:    21:12    CT Abdomen Pelvis  Without Contrast    Result Date: 8/22/2024  EXAMINATION: CT ABDOMEN PELVIS WITHOUT CONTRAST CLINICAL HISTORY: Abdominal pain, acute, nonlocalized; TECHNIQUE: Low dose axial images, sagittal and coronal reformations were obtained from the lung bases to the pubic symphysis without IV contrast.  Oral contrast was not administered. COMPARISON: CT 08/20/2024 FINDINGS: There is mild mosaic alteration of the visualized lung parenchyma.  There are scattered bandlike opacities suggesting atelectasis or scarring.  No significant pleural fluid.  Heart is enlarged.  There is a moderate to large pericardial effusion. Liver is mildly enlarged.  No focal hepatic abnormality on this limited noncontrast study.  The gallbladder is surgically absent.  There is no intra-or extrahepatic biliary ductal dilatation. Stomach appears within normal limits.  Pancreas and adrenal glands are unremarkable for noncontrast technique.  Spleen is mildly enlarged, similar to prior examination. Bilateral native kidneys are atrophic with prominent vascular calcifications.  There is no significant hydronephrosis.  Urinary bladder demonstrates nonspecific circumferential wall thickening.  Noncontrast appearance of the uterus is unremarkable.  Stable appearance of the bilateral adnexa.  No significant free fluid in the pelvis. The abdominal aorta is normal in course and  caliber extensive calcification of the abdominal aorta and visceral branch vessels.  There is no retroperitoneal hematoma. There are mildly prominent fluid-filled loops of small bowel throughout the abdomen and pelvis without discrete transition point which may reflect a nonspecific enteritis.  There is a moderate volume of retained stool throughout the colon suggesting constipation.  No CT evidence of acute appendicitis.  There is no ascites, portal venous gas, or free intraperitoneal air.  There is a small fat containing umbilical hernia.  There are scattered shotty mesenteric lymph nodes.  There are mildly prominent retroperitoneal lymph nodes, similar to prior study. Osseous structures demonstrate no acute fracture.  There is mild diffuse body wall edema.  There is a somewhat more focal region of soft tissue induration within the left paramidline lower abdominal wall possibly a prior injection site or hematoma.  Clinical correlation with physical examination advised.     Atrophic bilateral native kidneys.  No evidence of hydronephrosis. Mild nonspecific urinary bladder wall thickening.  Correlation with urinalysis advised. Moderate to large volume pericardial effusion.  Cardiomegaly. Mildly prominent fluid-filled loops of small bowel without discrete transition point which may reflect a nonspecific enteritis. Mild hepatosplenomegaly. Retained stool throughout the colon suggesting constipation. Additional findings as above. Electronically signed by: Kaushik Diego MD Date:    08/22/2024 Time:    20:35    X-Ray Chest AP Portable    Result Date: 8/22/2024  EXAMINATION: XR CHEST AP PORTABLE CLINICAL HISTORY: Dorsalgia, unspecified TECHNIQUE: Portable view of the chest was performed. COMPARISON: 08/20/2024. FINDINGS: Lungs are clear.  No focal consolidation.  Heart size is enlarged.  Mediastinal contours unremarkable.  Trachea midline. Bony thorax intact.     Cardiomegaly. Electronically signed by: Varun Trejo  Date:    08/22/2024 Time:    06:34    CT Renal Stone Study ABD Pelvis WO    Result Date: 8/20/2024  EXAMINATION: CT RENAL STONE STUDY ABD PELVIS WO CLINICAL HISTORY: Flank pain, kidney stone suspected;left; TECHNIQUE: Low dose axial images, sagittal and coronal reformations were obtained from the lung bases to the pubic symphysis.  Contrast was not administered. COMPARISON: July 16, 2024 FINDINGS: Mild bibasal atelectasis is evident.  There is moderate-sized pericardial effusion.  There is evidence of cardiomegaly and anasarca.  Extensive vascular calcifications are present.  There is evidence of prior cholecystectomy.  There is evidence of renal atrophy bilaterally.  There is an enlarged left periaortic lymph node measuring 13 mm in small stone mentions.  This is nonspecific but is similar to the prior examination.  There is a right adnexal mass which is chronic and may represent a ovarian mass or uterine fibroid.  This area measures 5 x 3.7 cm and is unchanged relative to 2023 a few sigmoid diverticula are seen.  I do not see CT evidence of diverticulitis.  There is no evidence of nephrolithiasis, hydronephrosis or hydroureter.  There is no evidence of bowel obstruction.     No evidence of nephrolithiasis, hydronephrosis or hydroureter. Bladder wall thickening which is nonspecific and evidence of bilateral renal atrophy. Cardiomegaly anasarca and evidence of prior cholecystectomy are again noted. Stable left periaortic enlarged lymph node. Limited evaluation of the colon and parenchyma. Right adnexal mass which is chronic and could represent a uterine fibroid but was not confidently seen on prior ultrasound. Bibasal atelectasis Electronically signed by: Thanh Davila MD Date:    08/20/2024 Time:    09:03    X-Ray Chest AP Portable    Result Date: 8/20/2024  EXAMINATION: XR CHEST AP PORTABLE CLINICAL HISTORY: Cough, unspecified TECHNIQUE: Portable view of the chest was performed. COMPARISON: 07/18/2024. FINDINGS:  Lungs are clear.  No focal consolidation.  Heart size is enlarged.  Mediastinal contours unremarkable.  Trachea midline. Bony thorax intact.     Cardiomegaly. Electronically signed by: Varun Trejo Date:    08/20/2024 Time:    08:46  - pulls last radiology orders

## 2024-09-13 NOTE — PROGRESS NOTES
"INPATIENT NEPHROLOGY Progress Note   [facility]    Patient Name: Tabby Howard  Date: 09/13/2024    Reason for consultation: ESRD    Chief Complaint:   Chief Complaint   Patient presents with    Flank Pain     Bilateral flank pain, was seen at [facility] and discharged within the hour, states "I got tylenol but I need dilaudid or morphine, I can't handle the pain"        History of Present Illness:  Tabby Howard is a 34 year old female with a previous medical history of ESRD on dialysis Tue/Thur/Sat, chronic abdominal pain, HTN, Type II Diabetes, who presented to the ED for left-sided flank pain. HPI is limited to chart review and ED note due to patient somnolence. Per chart review, patient has multiple similar episodes in the past and has had multiple previous CT scans performed. Also has had multiple hospitalizations for DKA in the past. Patient had dialysis earlier today, and following this, she had bilateral flank pain. Left was worse than right. She states that she has been taking her long-acting insulin. She initially presented to [facility] [place]. She had some lab work performed there which did show an anion gap with hyperglycemia that was elevated. At the outside facility, she repeatedly requested opioid analgesia which was not indicated at that time. Patient then stated that she would leave and go to another hospital. [facility] and came to [facility]. Upon arrival, complaining of bilateral with left-greater-than-right flank pain. States that she has been using her insulin although she has a long history of insulin noncompliance. Patient administered 2mg of ativan and 12.5 of IV phenergan in ED with alleviation of her pain symptoms. She was initiated on an insulin drip for DKA and admitted by hospital medicine for further evaluation and management. Consulted for dialysis.    Interval History:  9/11- missed HD yest- ordered HD today  9/12- got off 2L yest- volume status better- " routine HD/UF today; c/w some abd pain though better today  9/13- going for EGD today- got off 3.5L yest with HD    Plan of Care:    Assessment:  DKA; Abdominal pain  ESRD on HD TTS  HTN  SHPT  Anemia of CKD    Plan:    - off DKA Pathway- on SQ insulin- on D10 gtt for hypoglycemia while NPO for EGD  - HD TTS  - resume home BP meds when able to take adequate PO   - resume binder with meals when able to take adequate PO  - continue SHELLEY with HD TTS    Thank you for allowing us to participate in this patient's care. We will continue to follow.    Vital Signs:  Temp Readings from Last 3 Encounters:   09/13/24 98.4 °F (36.9 °C)   09/10/24 99.3 °F (37.4 °C) (Oral)   09/08/24 98.3 °F (36.8 °C) (Oral)       Pulse Readings from Last 3 Encounters:   09/13/24 79   09/10/24 96   09/08/24 70       BP Readings from Last 3 Encounters:   09/13/24 (!) 149/66   09/10/24 (!) 195/96   09/08/24 135/85       Weight:  Wt Readings from Last 3 Encounters:   09/13/24 52.7 kg (116 lb 2.9 oz)   09/10/24 53.1 kg (117 lb)   09/08/24 53.1 kg (117 lb)       Medications:  Scheduled Meds:   atropine 1%  1 drop Left Eye BID    brimonidine 0.15 % OPTH DROP  1 drop Both Eyes TID    dorzolamide-timolol 2-0.5%  1 drop Left Eye Q12H    epoetin teresa-epbx  100 Units/kg Intravenous Every Tues, Thurs, Sat    FLUoxetine  40 mg Oral Daily    levETIRAcetam  500 mg Oral BID    pantoprazole  40 mg Intravenous Daily    sevelamer carbonate  800 mg Oral TID WM    sucralfate  1 g Oral QID    timolol maleate 0.5%  1 drop Left Eye BID     Continuous Infusions:   D10W   Intravenous Continuous 75 mL/hr at 09/13/24 0822 New Bag at 09/13/24 0822     PRN Meds:.  Current Facility-Administered Medications:     acetaminophen, 650 mg, Oral, Q4H PRN    albuterol-ipratropium, 3 mL, Nebulization, Q6H PRN    busPIRone, 10 mg, Oral, TID PRN    dextrose 10%, 12.5 g, Intravenous, PRN    dextrose 10%, 25 g, Intravenous, PRN    diphenhydrAMINE, 25 mg, Oral, Q6H PRN    glucagon (human  recombinant), 1 mg, Intramuscular, PRN    glucose, 16 g, Oral, PRN    glucose, 24 g, Oral, PRN    insulin aspart U-100, 0-10 Units, Subcutaneous, QID (AC + HS) PRN    ondansetron, 4 mg, Intravenous, Q6H PRN    promethazine (PHENERGAN) 12.5 mg in 0.9% NaCl 50 mL IVPB, 12.5 mg, Intravenous, Q6H PRN    sodium chloride 0.9%, 10 mL, Intravenous, PRN    sodium chloride 0.9%, 10 mL, Intravenous, PRN  No current facility-administered medications on file prior to encounter.     Current Outpatient Medications on File Prior to Encounter   Medication Sig Dispense Refill    acetaZOLAMIDE (DIAMOX) 250 MG tablet take 1 tablet by mouth 3 times a day 90 tablet 3    albuterol (VENTOLIN HFA) 90 mcg/actuation inhaler Inhale 2 puffs every 4 hours by inhalation route. 8 g 2    apixaban (ELIQUIS) 5 mg Tab Take 1 tablet (5 mg total) by mouth 2 (two) times daily. Patient w/ thrombocytopenia <50, so held during admission, follow-up with hematologist about restarting 180 tablet 1    atropine 1% (ISOPTO ATROPINE) 1 % Drop Place 1 drop into the left eye 2 (two) times a day. 15 mL 3    brimonidine 0.15 % OPTH DROP (ALPHAGAN) 0.15 % ophthalmic solution Place 1 drop into both eyes 3 (three) times daily. 15 mL 3    brinzolamide (AZOPT) 1 % ophthalmic suspension Place1 drop in left eye 3 times a day for 30 days 15 mL 6    busPIRone (BUSPAR) 10 MG tablet Take 1 tablet (10 mg total) by mouth 3 (three) times daily as needed (anxiety). 60 tablet 5    cetirizine (ZYRTEC) 10 MG tablet Take 1 tablet every day by oral route. 30 tablet 0    dorzolamide-timolol 2-0.5% (COSOPT) 22.3-6.8 mg/mL ophthalmic solution place 1 drop in left eye twice a day  for 30 days 10 mL 0    FLUoxetine 40 MG capsule Take 1 capsule every day by oral route. 30 capsule 2    fluticasone propionate (FLONASE ALLERGY RELIEF) 50 mcg/actuation nasal spray Laie 1 spray every day by intranasal route. 16 g 2    gabapentin (NEURONTIN) 300 MG capsule Take 2 capsules twice a day by oral route  "for 90 days. 360 capsule 1    hydrALAZINE (APRESOLINE) 50 MG tablet Take 1 tablet (50 mg total) by mouth every 8 (eight) hours. 90 tablet 0    insulin aspart U-100 (NOVOLOG) 100 unit/mL (3 mL) InPn pen Inject 8 Units into the skin 3 (three) times daily with meals. 15 mL 0    insulin glargine U-100, Lantus, (LANTUS SOLOSTAR U-100 INSULIN) 100 unit/mL (3 mL) InPn pen Inject 22 units every day by subcutaneous route in the evening for 30 days, for diabetes. 15 mL 2    levETIRAcetam (KEPPRA) 500 MG Tab Take 1 tablet (500 mg total) by mouth 2 (two) times daily. 60 tablet 11    LORazepam (ATIVAN) 0.5 MG tablet Take 1 tablet 3 times a day by oral route for 7 days, for severe panic. 21 tablet 2    ondansetron (ZOFRAN-ODT) 4 MG TbDL Place 1 tablet (4 mg) on or under the tongue every 8 hours for 10 days. 24 tablet 2    oxyCODONE-acetaminophen (PERCOCET)  mg per tablet Take 1 tablet by mouth every 4 (four) hours as needed for Pain. 42 tablet 0    pantoprazole (PROTONIX) 40 MG tablet Take 1 tablet (40 mg total) by mouth once daily. 30 tablet 0    pen needle, diabetic 31 gauge x 3/16" Ndle Use as directed with insulin once daily 100 each 0    prednisoLONE acetate (PRED FORTE) 1 % DrpS Place 1 drop into the left eye 2 (two) times daily. 5 mL 3    promethazine (PHENERGAN) 25 MG tablet Take 1 tablet (25 mg total) by mouth every 6 (six) hours as needed. 15 tablet 0    sevelamer carbonate (RENVELA) 800 mg Tab Take 1 tablet (800 mg total) by mouth 3 (three) times daily with meals. 180 tablet 11    sucralfate (CARAFATE) 1 gram tablet Take 1 tablet (1 g total) by mouth 4 (four) times daily. 100 tablet 1    timolol maleate 0.5% (TIMOPTIC) 0.5 % Drop Place 1 drop in left eye 2 times a day for 30 days 5 mL 6    blood sugar diagnostic (TRUE METRIX GLUCOSE TEST STRIP) Strp Use 1 strip to check blood glucose three times daily 100 each 11    blood-glucose meter (TRUE METRIX GLUCOSE METER) Misc Use to check blood glucose 1 each 0    " blood-glucose meter,continuous Misc USE WITH SENSORS 1 each 0    blood-glucose sensor (DEXCOM G7 SENSOR) Heather Use as directed for CGM. Change sensor every 10 days 3 each 0    blood-glucose sensor Heather CHANGE EVERY 10 DAYS 3 each 0    lancets (TRUEPLUS LANCETS) 33 gauge Misc Use 1 to check blood glucose three times daily 100 each 11    [DISCONTINUED] atenoloL (TENORMIN) 50 MG tablet Take 1 tablet (50 mg total) by mouth every other day. 45 tablet 3    [DISCONTINUED] insulin detemir U-100, Levemir, (LEVEMIR FLEXTOUCH U100 INSULIN) 100 unit/mL (3 mL) InPn pen Inject 22 units every day by subcutaneous route in the evening for 90 days. 18 mL 1    [DISCONTINUED] omeprazole (PRILOSEC) 20 MG capsule Take 2 capsules (40 mg total) by mouth once daily. for 10 days 20 capsule 0       Review of Systems:  Neg    Physical Exam:  General Appearance:    NAD, AAO x 3, cooperative, appears stated age   Head:    Normocephalic, atraumatic   Eyes:    PER, EOMI, and conjunctiva/sclera clear bilaterally       Mouth:   Moist mucus membranes, no thrush or oral lesions,       normal dentition   Back:     No CVA tenderness   Lungs:     Clear to auscultation bilaterally, no wheezes, crackles,           rales or rhonchi, symmetric air movement, respirations unlabored   Chest wall:    No tenderness or deformity   Heart:    Regular rate and rhythm, S1 and S2 normal, no murmur, rub   or gallop   Abdomen:     Soft, tender, non-distended, bowel sounds active all four   quadrants, no RT or guarding, no masses, no organomegaly   Extremities:   Warm and well perfused, distal pulses are intact, no             cyanosis, + edema   MSK:   No joint or muscle swelling, tenderness or deformity   Skin:   Skin color, texture, turgor normal, no rashes or lesions   Neurologic/Psychiatric:   CNII-XII intact, normal strength and sensation       throughout, no asterixis; normal affect, memory, judgement     and insight      Results:  Lab Results   Component Value Date  "    09/13/2024    K 3.7 09/13/2024     09/13/2024    CO2 25 09/13/2024    BUN 15 09/13/2024    CREATININE 4.9 (H) 09/13/2024    CALCIUM 9.5 09/13/2024    ANIONGAP 12 09/13/2024    ESTGFRAFRICA 19 (L) 09/03/2022    EGFRNONAA 18 (A) 07/31/2022       Lab Results   Component Value Date    CALCIUM 9.5 09/13/2024    PHOS 3.6 09/13/2024       No results for input(s): "WBC", "RBC", "HGB", "HCT", "PLT", "MCV", "MCH", "MCHC" in the last 24 hours.      I have personally reviewed pertinent radiological imaging and reports from this admission.    I have spent > 35 minutes providing care for this patient for the above diagnoses. These services have included chart/data/imaging review, evaluation, exam, formulation of plan, , note preparation, and discussions with staff involved in this patient's care.    Prateek Clark MD MPH  [facility]  [address] [place] [id number]  [phone] (p)  [phone] (f)  "

## 2024-09-13 NOTE — ANESTHESIA PREPROCEDURE EVALUATION
09/13/2024  Tabby Howard is a 34 y.o., female.      Pre-op Assessment    I have reviewed the Patient Summary Reports.     I have reviewed the Nursing Notes. I have reviewed the NPO Status.   I have reviewed the Medications.     Review of Systems  Anesthesia Hx:             Denies Family Hx of Anesthesia complications.    Denies Personal Hx of Anesthesia complications.                    Hematology/Oncology:       -- Anemia:                                  Cardiovascular:     Hypertension, poorly controlled       CHF       ECG has been reviewed. Hypertensive heart Ds/CHF, pericardial effusion small/stable, h/o DVT                         Renal/:  Chronic Renal Disease, ESRD                Hepatic/GI:     GERD             Neurological:       Seizures, well controlled                                Endocrine:  Diabetes, using insulin           Psych:  Psychiatric History                  Physical Exam  General: Cooperative, Alert and Oriented    Airway:  Mallampati: I   Mouth Opening: Normal  TM Distance: Normal  Tongue: Normal  Neck ROM: Normal ROM    Chest/Lungs:  Normal Respiratory Rate    Heart:  Rate: Normal        Anesthesia Plan  Type of Anesthesia, risks & benefits discussed:    Anesthesia Type: Gen Natural Airway  Intra-op Monitoring Plan: Standard ASA Monitors  Induction:  IV  Informed Consent: Informed consent signed with the Patient and all parties understand the risks and agree with anesthesia plan.  All questions answered.   ASA Score: 3    Ready For Surgery From Anesthesia Perspective.     .

## 2024-09-13 NOTE — NURSING
, d10 infusion paused, pt now on renal diet, message sent to Dr Hinds, stated to d/c d10 infusion, pt made aware, care ongoing

## 2024-09-13 NOTE — PLAN OF CARE
Problem: Violence Risk or Actual  Goal: Anger and Impulse Control  Outcome: Progressing     Problem: Diabetic Ketoacidosis  Goal: Optimal Coping  Outcome: Progressing  Goal: Fluid and Electrolyte Balance with Absence of Ketosis  Outcome: Progressing     Problem: Adult Inpatient Plan of Care  Goal: Plan of Care Review  Outcome: Progressing  Goal: Patient-Specific Goal (Individualized)  Outcome: Progressing  Goal: Absence of Hospital-Acquired Illness or Injury  Outcome: Progressing  Goal: Optimal Comfort and Wellbeing  Outcome: Progressing  Goal: Readiness for Transition of Care  Outcome: Progressing     Problem: Diabetes Comorbidity  Goal: Blood Glucose Level Within Targeted Range  Outcome: Progressing     Problem: Hemodialysis  Goal: Safe, Effective Therapy Delivery  Outcome: Progressing  Goal: Effective Tissue Perfusion  Outcome: Progressing  Goal: Absence of Infection Signs and Symptoms  Outcome: Progressing     Problem: Pain Acute  Goal: Optimal Pain Control and Function  Outcome: Progressing     Problem: Fall Injury Risk  Goal: Absence of Fall and Fall-Related Injury  Outcome: Progressing     Problem: Infection  Goal: Absence of Infection Signs and Symptoms  Outcome: Progressing     Problem: Fatigue  Goal: Improved Activity Tolerance  Outcome: Progressing     Problem: Gastroenteritis  Goal: Effective Diarrhea Management  Outcome: Progressing  Goal: Fluid and Electrolyte Balance  Outcome: Progressing  Goal: Nausea and Vomiting Relief  Outcome: Progressing

## 2024-09-13 NOTE — TRANSFER OF CARE
"Anesthesia Transfer of Care Note    Patient: Tabby Howard    Procedure(s) Performed: Procedure(s) (LRB):  EGD (ESOPHAGOGASTRODUODENOSCOPY) (N/A)    Patient location: PACU    Anesthesia Type: general    Transport from OR: Transported from OR on room air with adequate spontaneous ventilation    Post pain: adequate analgesia    Post assessment: no apparent anesthetic complications and tolerated procedure well    Post vital signs: stable    Level of consciousness: awake and responds to stimulation    Nausea/Vomiting: no nausea/vomiting    Complications: none    Transfer of care protocol was followed      Last vitals: Visit Vitals  /63 (BP Location: Right leg, Patient Position: Lying)   Pulse 79   Temp 36.5 °C (97.7 °F)   Resp 16   Ht 5' 2" (1.575 m)   Wt 52.7 kg (116 lb 2.9 oz)   LMP  (LMP Unknown)   SpO2 (!) 94%   Breastfeeding No   BMI 21.25 kg/m²     "

## 2024-09-13 NOTE — CARE UPDATE
09/12/24 1925   Patient Assessment/Suction   Level of Consciousness (AVPU) alert   Respiratory Effort Unlabored   Expansion/Accessory Muscles/Retractions no use of accessory muscles;no retractions   PRE-TX-O2   Device (Oxygen Therapy) room air   SpO2 97 %   Pulse Oximetry Type Intermittent   $ Pulse Oximetry - Multiple Charge Pulse Oximetry - Multiple   Pulse 79   Resp 18   Aerosol Therapy   $ Aerosol Therapy Charges PRN treatment not required

## 2024-09-13 NOTE — PLAN OF CARE
Problem: Adult Inpatient Plan of Care  Goal: Plan of Care Review  Outcome: Progressing  Goal: Patient-Specific Goal (Individualized)  Outcome: Progressing  Goal: Optimal Comfort and Wellbeing  Outcome: Progressing     Problem: Diabetes Comorbidity  Goal: Blood Glucose Level Within Targeted Range  Outcome: Progressing     Problem: Hemodialysis  Goal: Safe, Effective Therapy Delivery  Outcome: Progressing  Goal: Effective Tissue Perfusion  Outcome: Progressing  Goal: Absence of Infection Signs and Symptoms  Outcome: Progressing     Problem: Fall Injury Risk  Goal: Absence of Fall and Fall-Related Injury  Outcome: Progressing     Problem: Infection  Goal: Absence of Infection Signs and Symptoms  Outcome: Progressing     Problem: Fatigue  Goal: Improved Activity Tolerance  Outcome: Progressing     Problem: Gastroenteritis  Goal: Effective Diarrhea Management  Outcome: Progressing  Goal: Fluid and Electrolyte Balance  Outcome: Progressing  Goal: Nausea and Vomiting Relief  Outcome: Progressing

## 2024-09-13 NOTE — NURSING
"Patient utilizes call light and calls to staff, stating "I feel like my blood sugar is low." Immediately upon patient calling, POCT glucose is assessed x2 per protocol. POCT glucose results 48 and 49. Patient reports "I feel really weak." Patient is awake and alert. Just prior to patient calling, ordered labs just obtained, therefore STAT glucose is not ordered as labs just obtained. Upon POCT glucose being obtained, hypoglycemia protocol is followed as ordered. Dextrose 10% bolus 250mL is initiated. Upon treatment being initiated, WILL Salas DNP, is notified at 0434 of above information and treatment being initiated. Glucose is monitored during and post infusion with adequate increase in glucose noted. Continued conversation with WILL Salas DNP regarding ongoing glucose had.   "

## 2024-09-13 NOTE — ASSESSMENT & PLAN NOTE
Patient has high anion gap metabolic acidosis upon admission, beta hydroxybutyrate negative, most likely secondary to ESRD, and also patient taking Diamox at home- unclear etiology.  Patient does not have DKA, no nausea vomiting diarrhea.   Patient has long history of gastroparesis, not candidate for Reglan- patient has significant QTC prolongation .   Start diabetic renal diet.  Resume insulin sliding scale  Discontinue Diamox, unclear why she is on this medication.

## 2024-09-14 ENCOUNTER — HOSPITAL ENCOUNTER (EMERGENCY)
Facility: HOSPITAL | Age: 34
Discharge: HOME | End: 2024-09-15
Attending: EMERGENCY MEDICINE
Payer: MEDICAID

## 2024-09-14 VITALS
TEMPERATURE: 98 F | WEIGHT: 116.19 LBS | HEART RATE: 82 BPM | RESPIRATION RATE: 16 BRPM | OXYGEN SATURATION: 100 % | BODY MASS INDEX: 21.38 KG/M2 | HEIGHT: 62 IN | SYSTOLIC BLOOD PRESSURE: 120 MMHG | DIASTOLIC BLOOD PRESSURE: 84 MMHG

## 2024-09-14 DIAGNOSIS — R53.1 GENERALIZED WEAKNESS: ICD-10-CM

## 2024-09-14 DIAGNOSIS — R10.9 FLANK PAIN: Primary | ICD-10-CM

## 2024-09-14 DIAGNOSIS — R53.1 WEAKNESS: ICD-10-CM

## 2024-09-14 DIAGNOSIS — R06.02 SHORTNESS OF BREATH: ICD-10-CM

## 2024-09-14 LAB
ALBUMIN SERPL BCP-MCNC: 3.9 G/DL (ref 3.5–5.2)
ALP SERPL-CCNC: 95 U/L (ref 55–135)
ALT SERPL W/O P-5'-P-CCNC: 9 U/L (ref 10–44)
ANION GAP SERPL CALC-SCNC: 10 MMOL/L (ref 8–16)
ANION GAP SERPL CALC-SCNC: 10 MMOL/L (ref 8–16)
ANION GAP SERPL CALC-SCNC: 17 MMOL/L (ref 8–16)
AST SERPL-CCNC: 15 U/L (ref 10–40)
BILIRUB SERPL-MCNC: 1 MG/DL (ref 0.1–1)
BUN SERPL-MCNC: 20 MG/DL (ref 6–20)
BUN SERPL-MCNC: 20 MG/DL (ref 6–20)
BUN SERPL-MCNC: 25 MG/DL (ref 6–20)
CALCIUM SERPL-MCNC: 10.4 MG/DL (ref 8.7–10.5)
CALCIUM SERPL-MCNC: 9.4 MG/DL (ref 8.7–10.5)
CALCIUM SERPL-MCNC: 9.4 MG/DL (ref 8.7–10.5)
CHLORIDE SERPL-SCNC: 96 MMOL/L (ref 95–110)
CHLORIDE SERPL-SCNC: 96 MMOL/L (ref 95–110)
CHLORIDE SERPL-SCNC: 97 MMOL/L (ref 95–110)
CO2 SERPL-SCNC: 23 MMOL/L (ref 23–29)
CO2 SERPL-SCNC: 29 MMOL/L (ref 23–29)
CO2 SERPL-SCNC: 29 MMOL/L (ref 23–29)
CREAT SERPL-MCNC: 4.5 MG/DL (ref 0.5–1.4)
CREAT SERPL-MCNC: 4.5 MG/DL (ref 0.5–1.4)
CREAT SERPL-MCNC: 7.3 MG/DL (ref 0.5–1.4)
EST. GFR  (NO RACE VARIABLE): 12.4 ML/MIN/1.73 M^2
EST. GFR  (NO RACE VARIABLE): 12.4 ML/MIN/1.73 M^2
EST. GFR  (NO RACE VARIABLE): 7 ML/MIN/1.73 M^2
GLUCOSE SERPL-MCNC: 273 MG/DL (ref 70–110)
GLUCOSE SERPL-MCNC: 34 MG/DL (ref 70–110)
GLUCOSE SERPL-MCNC: 66 MG/DL (ref 70–110)
GLUCOSE SERPL-MCNC: 66 MG/DL (ref 70–110)
HCG INTACT+B SERPL-ACNC: 1.62 MIU/ML
LACTATE SERPL-SCNC: 0.9 MMOL/L (ref 0.5–1.9)
LIPASE SERPL-CCNC: 5 U/L (ref 4–60)
MAGNESIUM SERPL-MCNC: 2.2 MG/DL (ref 1.6–2.6)
MAGNESIUM SERPL-MCNC: 2.4 MG/DL (ref 1.6–2.6)
PHOSPHATE SERPL-MCNC: 3 MG/DL (ref 2.7–4.5)
PHOSPHATE SERPL-MCNC: 4.1 MG/DL (ref 2.7–4.5)
POCT GLUCOSE: 31 MG/DL (ref 70–110)
POCT GLUCOSE: 41 MG/DL (ref 70–110)
POCT GLUCOSE: 49 MG/DL (ref 70–110)
POCT GLUCOSE: 51 MG/DL (ref 70–110)
POCT GLUCOSE: 57 MG/DL (ref 70–110)
POCT GLUCOSE: 73 MG/DL (ref 70–110)
POCT GLUCOSE: 80 MG/DL (ref 70–110)
POCT GLUCOSE: 90 MG/DL (ref 70–110)
POTASSIUM SERPL-SCNC: 3.9 MMOL/L (ref 3.5–5.1)
POTASSIUM SERPL-SCNC: 3.9 MMOL/L (ref 3.5–5.1)
POTASSIUM SERPL-SCNC: 4.3 MMOL/L (ref 3.5–5.1)
PROT SERPL-MCNC: 7.4 G/DL (ref 6–8.4)
SODIUM SERPL-SCNC: 135 MMOL/L (ref 136–145)
SODIUM SERPL-SCNC: 135 MMOL/L (ref 136–145)
SODIUM SERPL-SCNC: 137 MMOL/L (ref 136–145)

## 2024-09-14 PROCEDURE — 82947 ASSAY GLUCOSE BLOOD QUANT: CPT | Performed by: INTERNAL MEDICINE

## 2024-09-14 PROCEDURE — 63600175 PHARM REV CODE 636 W HCPCS: Mod: JZ,JG | Performed by: INTERNAL MEDICINE

## 2024-09-14 PROCEDURE — 36415 COLL VENOUS BLD VENIPUNCTURE: CPT | Performed by: HOSPITALIST

## 2024-09-14 PROCEDURE — 90935 HEMODIALYSIS ONE EVALUATION: CPT

## 2024-09-14 PROCEDURE — 80053 COMPREHEN METABOLIC PANEL: CPT | Performed by: STUDENT IN AN ORGANIZED HEALTH CARE EDUCATION/TRAINING PROGRAM

## 2024-09-14 PROCEDURE — 96375 TX/PRO/DX INJ NEW DRUG ADDON: CPT

## 2024-09-14 PROCEDURE — 83690 ASSAY OF LIPASE: CPT | Performed by: STUDENT IN AN ORGANIZED HEALTH CARE EDUCATION/TRAINING PROGRAM

## 2024-09-14 PROCEDURE — 99900035 HC TECH TIME PER 15 MIN (STAT)

## 2024-09-14 PROCEDURE — 96365 THER/PROPH/DIAG IV INF INIT: CPT

## 2024-09-14 PROCEDURE — 25000003 PHARM REV CODE 250: Performed by: STUDENT IN AN ORGANIZED HEALTH CARE EDUCATION/TRAINING PROGRAM

## 2024-09-14 PROCEDURE — 84100 ASSAY OF PHOSPHORUS: CPT | Mod: 59 | Performed by: STUDENT IN AN ORGANIZED HEALTH CARE EDUCATION/TRAINING PROGRAM

## 2024-09-14 PROCEDURE — 25000003 PHARM REV CODE 250: Performed by: INTERNAL MEDICINE

## 2024-09-14 PROCEDURE — 63600175 PHARM REV CODE 636 W HCPCS: Performed by: INTERNAL MEDICINE

## 2024-09-14 PROCEDURE — 83735 ASSAY OF MAGNESIUM: CPT | Mod: 91 | Performed by: STUDENT IN AN ORGANIZED HEALTH CARE EDUCATION/TRAINING PROGRAM

## 2024-09-14 PROCEDURE — 63600175 PHARM REV CODE 636 W HCPCS: Performed by: STUDENT IN AN ORGANIZED HEALTH CARE EDUCATION/TRAINING PROGRAM

## 2024-09-14 PROCEDURE — 80048 BASIC METABOLIC PNL TOTAL CA: CPT | Performed by: HOSPITALIST

## 2024-09-14 PROCEDURE — 84100 ASSAY OF PHOSPHORUS: CPT

## 2024-09-14 PROCEDURE — 25000003 PHARM REV CODE 250: Performed by: HOSPITALIST

## 2024-09-14 PROCEDURE — 83735 ASSAY OF MAGNESIUM: CPT

## 2024-09-14 PROCEDURE — 36415 COLL VENOUS BLD VENIPUNCTURE: CPT | Performed by: INTERNAL MEDICINE

## 2024-09-14 PROCEDURE — 84702 CHORIONIC GONADOTROPIN TEST: CPT | Performed by: STUDENT IN AN ORGANIZED HEALTH CARE EDUCATION/TRAINING PROGRAM

## 2024-09-14 PROCEDURE — 83605 ASSAY OF LACTIC ACID: CPT | Performed by: STUDENT IN AN ORGANIZED HEALTH CARE EDUCATION/TRAINING PROGRAM

## 2024-09-14 PROCEDURE — 63600175 PHARM REV CODE 636 W HCPCS

## 2024-09-14 PROCEDURE — 94761 N-INVAS EAR/PLS OXIMETRY MLT: CPT

## 2024-09-14 PROCEDURE — 82962 GLUCOSE BLOOD TEST: CPT

## 2024-09-14 PROCEDURE — 94760 N-INVAS EAR/PLS OXIMETRY 1: CPT

## 2024-09-14 PROCEDURE — 84443 ASSAY THYROID STIM HORMONE: CPT | Performed by: STUDENT IN AN ORGANIZED HEALTH CARE EDUCATION/TRAINING PROGRAM

## 2024-09-14 PROCEDURE — 63600175 PHARM REV CODE 636 W HCPCS: Mod: JZ,JG | Performed by: HOSPITALIST

## 2024-09-14 PROCEDURE — 99285 EMERGENCY DEPT VISIT HI MDM: CPT | Mod: 25

## 2024-09-14 RX ORDER — INSULIN GLARGINE 100 [IU]/ML
22 INJECTION, SOLUTION SUBCUTANEOUS DAILY
Status: DISCONTINUED | OUTPATIENT
Start: 2024-09-14 | End: 2024-09-14 | Stop reason: HOSPADM

## 2024-09-14 RX ORDER — HYDROMORPHONE HYDROCHLORIDE 1 MG/ML
0.5 INJECTION, SOLUTION INTRAMUSCULAR; INTRAVENOUS; SUBCUTANEOUS ONCE
Status: COMPLETED | OUTPATIENT
Start: 2024-09-14 | End: 2024-09-14

## 2024-09-14 RX ORDER — PANTOPRAZOLE SODIUM 40 MG/1
40 TABLET, DELAYED RELEASE ORAL DAILY
Status: DISCONTINUED | OUTPATIENT
Start: 2024-09-14 | End: 2024-09-14 | Stop reason: HOSPADM

## 2024-09-14 RX ORDER — LIDOCAINE 50 MG/G
1 PATCH TOPICAL ONCE
Status: DISCONTINUED | OUTPATIENT
Start: 2024-09-14 | End: 2024-09-15 | Stop reason: HOSPADM

## 2024-09-14 RX ORDER — ACETAMINOPHEN 325 MG/1
650 TABLET ORAL
Status: COMPLETED | OUTPATIENT
Start: 2024-09-14 | End: 2024-09-15

## 2024-09-14 RX ORDER — HYDROMORPHONE HYDROCHLORIDE 1 MG/ML
1 INJECTION, SOLUTION INTRAMUSCULAR; INTRAVENOUS; SUBCUTANEOUS ONCE
Status: COMPLETED | OUTPATIENT
Start: 2024-09-14 | End: 2024-09-14

## 2024-09-14 RX ADMIN — FLUOXETINE HYDROCHLORIDE 40 MG: 20 CAPSULE ORAL at 08:09

## 2024-09-14 RX ADMIN — APIXABAN 5 MG: 2.5 TABLET, FILM COATED ORAL at 08:09

## 2024-09-14 RX ADMIN — HYDROMORPHONE HYDROCHLORIDE 0.5 MG: 1 INJECTION, SOLUTION INTRAMUSCULAR; INTRAVENOUS; SUBCUTANEOUS at 08:09

## 2024-09-14 RX ADMIN — ATROPINE SULFATE 1 DROP: 10 SOLUTION OPHTHALMIC at 08:09

## 2024-09-14 RX ADMIN — BUSPIRONE HYDROCHLORIDE 10 MG: 10 TABLET ORAL at 06:09

## 2024-09-14 RX ADMIN — ONDANSETRON 4 MG: 2 INJECTION INTRAMUSCULAR; INTRAVENOUS at 03:09

## 2024-09-14 RX ADMIN — OXYCODONE HYDROCHLORIDE AND ACETAMINOPHEN 1 TABLET: 5; 325 TABLET ORAL at 08:09

## 2024-09-14 RX ADMIN — SEVELAMER CARBONATE 800 MG: 800 TABLET, FILM COATED ORAL at 08:09

## 2024-09-14 RX ADMIN — HYDROMORPHONE HYDROCHLORIDE 0.5 MG: 1 INJECTION, SOLUTION INTRAMUSCULAR; INTRAVENOUS; SUBCUTANEOUS at 04:09

## 2024-09-14 RX ADMIN — INSULIN GLARGINE 22 UNITS: 100 INJECTION, SOLUTION SUBCUTANEOUS at 08:09

## 2024-09-14 RX ADMIN — HYDROMORPHONE HYDROCHLORIDE 1 MG: 1 INJECTION, SOLUTION INTRAMUSCULAR; INTRAVENOUS; SUBCUTANEOUS at 09:09

## 2024-09-14 RX ADMIN — DORZOLAMIDE HYDROCHLORIDE TIMOLOL MALEATE 1 DROP: 20; 5 SOLUTION/ DROPS OPHTHALMIC at 08:09

## 2024-09-14 RX ADMIN — PROMETHAZINE HYDROCHLORIDE 12.5 MG: 25 INJECTION INTRAMUSCULAR; INTRAVENOUS at 11:09

## 2024-09-14 RX ADMIN — BRIMONIDINE TARTRATE 1 DROP: 1.5 SOLUTION OPHTHALMIC at 08:09

## 2024-09-14 RX ADMIN — GLUCAGON 1 MG: 1 INJECTION, POWDER, LYOPHILIZED, FOR SOLUTION INTRAMUSCULAR; INTRAVENOUS at 02:09

## 2024-09-14 RX ADMIN — LEVETIRACETAM 500 MG: 250 TABLET, FILM COATED ORAL at 08:09

## 2024-09-14 RX ADMIN — SUCRALFATE 1 G: 1 TABLET ORAL at 08:09

## 2024-09-14 RX ADMIN — EPOETIN ALFA-EPBX 5280 UNITS: 4000 INJECTION, SOLUTION INTRAVENOUS; SUBCUTANEOUS at 11:09

## 2024-09-14 RX ADMIN — Medication 16 G: at 02:09

## 2024-09-14 RX ADMIN — TIMOLOL MALEATE 1 DROP: 5 SOLUTION/ DROPS OPHTHALMIC at 08:09

## 2024-09-14 RX ADMIN — PANTOPRAZOLE SODIUM 40 MG: 40 TABLET, DELAYED RELEASE ORAL at 08:09

## 2024-09-14 NOTE — PLAN OF CARE
The pt is cleared for discharge home from case management.    09/14/24 1055   Final Note   Assessment Type Final Discharge Note   Anticipated Discharge Disposition Home   What phone number can be called within the next 1-3 days to see how you are doing after discharge? [phone]   Post-Acute Status   Discharge Delays None known at this time

## 2024-09-14 NOTE — ASSESSMENT & PLAN NOTE
Chronic, unclear etiology, most likely secondary to gastroparesis?.     CT reviewed shows questionable inflammatory gastritis.  EGD shows no acute findings, GI signed off

## 2024-09-14 NOTE — ASSESSMENT & PLAN NOTE
Patient's FSGs are uncontrolled due to hyperglycemia on current medication regimen.  Last A1c reviewed-   Lab Results   Component Value Date    HGBA1C 9.0 (H) 09/10/2024     Most recent fingerstick glucose reviewed-   Recent Labs   Lab 09/13/24  1229 09/13/24  1645 09/13/24 2007 09/13/24  2222   POCTGLUCOSE 213* 306* 318* 278*       Resume home regime on discharge   Patient is noncompliant

## 2024-09-14 NOTE — ASSESSMENT & PLAN NOTE
Anemia is likely due to chronic disease due to ESRD. Most recent hemoglobin and hematocrit are listed below.  Stable

## 2024-09-14 NOTE — NURSING
1355- Patient called out stating that she feels like her glucose is low. Glucose was 31 and 41 when rechecked. Patient awake and alert, sitting up in bed and was given juice and cereal and milk. IV and telemetry already removed for discharge and patient currently waiting for family to arrive to pick her up. Will recheck glucose again. Dr Yusuf notified of above and stated patient is still cleared for discharge as long as her glucose improves.      1420- Patients glucose is now 57 after being given oral glucose tablets 16 grams. Patient is refusing anything else by mouth and is stating that her ride is outside and her glucose will eventually come up. Asked Dr Yusuf if patient can be given the PRN IM Glucagon injection? Also lab just mariann venous glucose. Dr Yusuf stated ok to give IM glucagon. Medication given per order.       1430- Glucose 90 and Dr Yusuf notified. Patient discharged

## 2024-09-14 NOTE — ASSESSMENT & PLAN NOTE
Better controlled     Home meds for hypertension were reviewed and noted below.   Hypertension Medications               hydrALAZINE (APRESOLINE) 50 MG tablet Take 1 tablet (50 mg total) by mouth every 8 (eight) hours.

## 2024-09-14 NOTE — PLAN OF CARE
Problem: Diabetic Ketoacidosis  Goal: Fluid and Electrolyte Balance with Absence of Ketosis  Outcome: Progressing     Problem: Adult Inpatient Plan of Care  Goal: Plan of Care Review  Outcome: Progressing     Problem: Hemodialysis  Goal: Effective Tissue Perfusion  Outcome: Progressing  Goal: Absence of Infection Signs and Symptoms  Outcome: Progressing     Problem: Pain Acute  Goal: Optimal Pain Control and Function  Outcome: Progressing     Problem: Fall Injury Risk  Goal: Absence of Fall and Fall-Related Injury  Outcome: Progressing     Problem: Infection  Goal: Absence of Infection Signs and Symptoms  Outcome: Progressing     Problem: Fatigue  Goal: Improved Activity Tolerance  Outcome: Progressing     Problem: Gastroenteritis  Goal: Fluid and Electrolyte Balance  Outcome: Progressing

## 2024-09-14 NOTE — ASSESSMENT & PLAN NOTE
Patient is identified as having Diastolic (HFpEF) heart failure that is Chronic. CHF is currently controlled. Latest ECHO performed and demonstrates- Results for orders placed during the hospital encounter of 07/16/24    Echo    Interpretation Summary    Left Ventricle: The left ventricle is normal in size. Normal wall thickness. There is normal systolic function with a visually estimated ejection fraction of 65 - 70%. There is normal diastolic function.    Right Ventricle: Normal right ventricular cavity size. Wall thickness is normal. Systolic function is normal.    Pericardium: There is a small effusion. No indication of cardiac tamponade. Evidence includes no chamber collapse, no respiratory chamber variation.    Volume management per dialysis

## 2024-09-14 NOTE — PROGRESS NOTES
09/14/24 1200   Handoff Report   Received From Stephanie Mena   Given To Ubaldo   Vital Signs   Temp 97.8 °F (36.6 °C)   Temp Source Oral   Pulse 82   Resp 16   SpO2 100 %   Pulse Oximetry Type Intermittent   Probe Placed On (Pulse Ox) finger   Device (Oxygen Therapy) room air   /84   BP Location Right arm   BP Method Automatic   Patient Position Lying   Assessments (Pre/Post)   Blood Liters Processed (BLP) 56.7   Transport Modality not applicable   Level of Consciousness (AVPU) alert   Dialyzer Clearance mildly streaked        Hemodialysis AV Fistula Left upper arm   No placement date or time found.   Location: Left upper arm   Site Assessment Clean;Dry;Intact   Patency Present;Thrill;Bruit   Status Deaccessed   Flows Good   Dressing Intervention First dressing   Dressing Status Clean;Dry;Intact   Site Condition No complications   Dressing Gauze   Post-Hemodialysis Assessment   Rinseback Volume (mL) 250 mL   Blood Volume Processed (Liters) 56.7 L   Dialyzer Clearance Lightly streaked   Duration of Treatment 180 minutes   Additional Fluid Intake (mL) 500 mL   Total UF (mL) 2000 mL   Net Fluid Removal 1500   Patient Response to Treatment Tolerated fair   Post-Treatment Weight 51.2 kg (112 lb 14 oz)   Treatment Weight Change -1.5   Arterial bleeding stop time (min) 5 min   Venous bleeding stop time (min) 6 min   Post-Hemodialysis Comments Tx complete, pt stable     HD tx tolerated fair    Net UF 1.5 L  UF goal decreased d/t asymptomatic hypotension  Pt stable, denies complaints

## 2024-09-14 NOTE — PROGRESS NOTES
"INPATIENT NEPHROLOGY Progress Note   [facility]    Patient Name: Tabby Howard  Date: 09/14/2024    Reason for consultation: ESRD    Chief Complaint:   Chief Complaint   Patient presents with    Flank Pain     Bilateral flank pain, was seen at [facility] and discharged within the hour, states "I got tylenol but I need dilaudid or morphine, I can't handle the pain"      History of Present Illness:  Tabby Howard is a 34 year old female with a previous medical history of ESRD on dialysis Tue/Thur/Sat, chronic abdominal pain, HTN, Type II Diabetes, who presented to the ED for left-sided flank pain. HPI is limited to chart review and ED note due to patient somnolence. Per chart review, patient has multiple similar episodes in the past and has had multiple previous CT scans performed. Also has had multiple hospitalizations for DKA in the past. Patient had dialysis earlier today, and following this, she had bilateral flank pain. Left was worse than right. She states that she has been taking her long-acting insulin. She initially presented to [facility]. She had some lab work performed there which did show an anion gap with hyperglycemia that was elevated. At the outside facility, she repeatedly requested opioid analgesia which was not indicated at that time. Patient then stated that she would leave and go to another hospital. [facility] [place] and came to [facility]. Upon arrival, complaining of bilateral with left-greater-than-right flank pain. States that she has been using her insulin although she has a long history of insulin noncompliance. Patient administered 2mg of ativan and 12.5 of IV phenergan in ED with alleviation of her pain symptoms. She was initiated on an insulin drip for DKA and admitted by hospital medicine for further evaluation and management. Consulted for dialysis.    Interval History:  9/11- missed HD yest- ordered HD today  9/12- got off 2L yest- volume status better- " routine HD/UF today; c/w some abd pain though better today  9/13- going for EGD today- got off 3.5L yest with HD  9/14 VSS. Unable to tolerate HD well due to pain, moving too much and can infiltrate fistula, will give more dilaudid - if not working , will terminate dialysis...    Plan of Care:    Abdominal pain  ESRD on HD TTS  HTN  Secondary HPT  Anemia of CKD  DM    Plan:    - off DKA Pathway- on SQ insulin- on D10 gtt for hypoglycemia while NPO for EGD  - HD TTS  - resume home BP meds when able to take adequate PO   - resume binder with meals when able to take adequate PO  - continue SHELLEY with HD TTS    Thank you for allowing us to participate in this patient's care. We will continue to follow.    Vital Signs:  Temp Readings from Last 3 Encounters:   09/14/24 97.8 °F (36.6 °C)   09/10/24 99.3 °F (37.4 °C) (Oral)   09/08/24 98.3 °F (36.8 °C) (Oral)       Pulse Readings from Last 3 Encounters:   09/14/24 90   09/10/24 96   09/08/24 70       BP Readings from Last 3 Encounters:   09/14/24 (!) 134/108   09/10/24 (!) 195/96   09/08/24 135/85       Weight:  Wt Readings from Last 3 Encounters:   09/13/24 52.7 kg (116 lb 2.9 oz)   09/10/24 53.1 kg (117 lb)   09/08/24 53.1 kg (117 lb)       Medications:  Scheduled Meds:   apixaban  5 mg Oral BID    atropine 1%  1 drop Left Eye BID    brimonidine 0.15 % OPTH DROP  1 drop Both Eyes TID    dorzolamide-timolol 2-0.5%  1 drop Left Eye Q12H    epoetin teresa-epbx  100 Units/kg Intravenous Every Tues, Thurs, Sat    FLUoxetine  40 mg Oral Daily    insulin glargine U-100 (Lantus)  22 Units Subcutaneous Daily    levETIRAcetam  500 mg Oral BID    mupirocin   Nasal BID    pantoprazole  40 mg Oral Daily    sevelamer carbonate  800 mg Oral TID WM    sucralfate  1 g Oral QID    timolol maleate 0.5%  1 drop Left Eye BID     Continuous Infusions:      PRN Meds:.  Current Facility-Administered Medications:     acetaminophen, 650 mg, Oral, Q4H PRN    albuterol-ipratropium, 3 mL, Nebulization,  Q6H PRN    busPIRone, 10 mg, Oral, TID PRN    dextrose 10%, 12.5 g, Intravenous, PRN    dextrose 10%, 25 g, Intravenous, PRN    diphenhydrAMINE, 25 mg, Oral, Q6H PRN    glucagon (human recombinant), 1 mg, Intramuscular, PRN    glucose, 16 g, Oral, PRN    glucose, 24 g, Oral, PRN    hydrALAZINE, 50 mg, Oral, Q8H PRN    HYDROmorphone, 0.5 mg, Intravenous, Q6H PRN    insulin aspart U-100, 0-10 Units, Subcutaneous, QID (AC + HS) PRN    ondansetron, 4 mg, Intravenous, Q6H PRN    oxyCODONE-acetaminophen, 1 tablet, Oral, Q4H PRN    promethazine (PHENERGAN) 12.5 mg in 0.9% NaCl 50 mL IVPB, 12.5 mg, Intravenous, Q6H PRN    sodium chloride 0.9%, 10 mL, Intravenous, PRN    sodium chloride 0.9%, 10 mL, Intravenous, PRN  No current facility-administered medications on file prior to encounter.     Current Outpatient Medications on File Prior to Encounter   Medication Sig Dispense Refill    acetaZOLAMIDE (DIAMOX) 250 MG tablet take 1 tablet by mouth 3 times a day 90 tablet 3    albuterol (VENTOLIN HFA) 90 mcg/actuation inhaler Inhale 2 puffs every 4 hours by inhalation route. 8 g 2    apixaban (ELIQUIS) 5 mg Tab Take 1 tablet (5 mg total) by mouth 2 (two) times daily. Patient w/ thrombocytopenia <50, so held during admission, follow-up with hematologist about restarting 180 tablet 1    atropine 1% (ISOPTO ATROPINE) 1 % Drop Place 1 drop into the left eye 2 (two) times a day. 15 mL 3    brimonidine 0.15 % OPTH DROP (ALPHAGAN) 0.15 % ophthalmic solution Place 1 drop into both eyes 3 (three) times daily. 15 mL 3    brinzolamide (AZOPT) 1 % ophthalmic suspension Place1 drop in left eye 3 times a day for 30 days 15 mL 6    busPIRone (BUSPAR) 10 MG tablet Take 1 tablet (10 mg total) by mouth 3 (three) times daily as needed (anxiety). 60 tablet 5    cetirizine (ZYRTEC) 10 MG tablet Take 1 tablet every day by oral route. 30 tablet 0    dorzolamide-timolol 2-0.5% (COSOPT) 22.3-6.8 mg/mL ophthalmic solution place 1 drop in left eye twice a  "day  for 30 days 10 mL 0    FLUoxetine 40 MG capsule Take 1 capsule every day by oral route. 30 capsule 2    fluticasone propionate (FLONASE ALLERGY RELIEF) 50 mcg/actuation nasal spray [place] 1 spray every day by intranasal route. 16 g 2    gabapentin (NEURONTIN) 300 MG capsule Take 2 capsules twice a day by oral route for 90 days. 360 capsule 1    hydrALAZINE (APRESOLINE) 50 MG tablet Take 1 tablet (50 mg total) by mouth every 8 (eight) hours. 90 tablet 0    insulin aspart U-100 (NOVOLOG) 100 unit/mL (3 mL) InPn pen Inject 8 Units into the skin 3 (three) times daily with meals. 15 mL 0    insulin glargine U-100, Lantus, (LANTUS SOLOSTAR U-100 INSULIN) 100 unit/mL (3 mL) InPn pen Inject 22 units every day by subcutaneous route in the evening for 30 days, for diabetes. 15 mL 2    levETIRAcetam (KEPPRA) 500 MG Tab Take 1 tablet (500 mg total) by mouth 2 (two) times daily. 60 tablet 11    LORazepam (ATIVAN) 0.5 MG tablet Take 1 tablet 3 times a day by oral route for 7 days, for severe panic. 21 tablet 2    ondansetron (ZOFRAN-ODT) 4 MG TbDL Place 1 tablet (4 mg) on or under the tongue every 8 hours for 10 days. 24 tablet 2    oxyCODONE-acetaminophen (PERCOCET)  mg per tablet Take 1 tablet by mouth every 4 (four) hours as needed for Pain. 42 tablet 0    pantoprazole (PROTONIX) 40 MG tablet Take 1 tablet (40 mg total) by mouth once daily. 30 tablet 0    pen needle, diabetic 31 gauge x 3/16" Ndle Use as directed with insulin once daily 100 each 0    prednisoLONE acetate (PRED FORTE) 1 % DrpS Place 1 drop into the left eye 2 (two) times daily. 5 mL 3    promethazine (PHENERGAN) 25 MG tablet Take 1 tablet (25 mg total) by mouth every 6 (six) hours as needed. 15 tablet 0    sevelamer carbonate (RENVELA) 800 mg Tab Take 1 tablet (800 mg total) by mouth 3 (three) times daily with meals. 180 tablet 11    sucralfate (CARAFATE) 1 gram tablet Take 1 tablet (1 g total) by mouth 4 (four) times daily. 100 tablet 1    timolol " maleate 0.5% (TIMOPTIC) 0.5 % Drop Place 1 drop in left eye 2 times a day for 30 days 5 mL 6    blood sugar diagnostic (TRUE METRIX GLUCOSE TEST STRIP) Strp Use 1 strip to check blood glucose three times daily 100 each 11    blood-glucose meter (TRUE METRIX GLUCOSE METER) Misc Use to check blood glucose 1 each 0    blood-glucose meter,continuous Misc USE WITH SENSORS 1 each 0    blood-glucose sensor (DEXCOM G7 SENSOR) Heather Use as directed for CGM. Change sensor every 10 days 3 each 0    blood-glucose sensor Heather CHANGE EVERY 10 DAYS 3 each 0    lancets (TRUEPLUS LANCETS) 33 gauge Misc Use 1 to check blood glucose three times daily 100 each 11    [DISCONTINUED] atenoloL (TENORMIN) 50 MG tablet Take 1 tablet (50 mg total) by mouth every other day. 45 tablet 3    [DISCONTINUED] insulin detemir U-100, Levemir, (LEVEMIR FLEXTOUCH U100 INSULIN) 100 unit/mL (3 mL) InPn pen Inject 22 units every day by subcutaneous route in the evening for 90 days. 18 mL 1    [DISCONTINUED] omeprazole (PRILOSEC) 20 MG capsule Take 2 capsules (40 mg total) by mouth once daily. for 10 days 20 capsule 0       Review of Systems:  Neg    Physical Exam:  General Appearance:    NAD, AAO x 3, cooperative, appears stated age   Head:    Normocephalic, atraumatic   Eyes:    PER, EOMI, and conjunctiva/sclera clear bilaterally       Mouth:   Moist mucus membranes, no thrush or oral lesions,       normal dentition   Back:     No CVA tenderness   Lungs:     Clear to auscultation bilaterally, no wheezes, crackles,           rales or rhonchi, symmetric air movement, respirations unlabored   Chest wall:    No tenderness or deformity   Heart:    Regular rate and rhythm, S1 and S2 normal, no murmur, rub   or gallop   Abdomen:     Soft, tender, non-distended, bowel sounds active all four   quadrants, no RT or guarding, no masses, no organomegaly   Extremities:   Warm and well perfused, distal pulses are intact, no             cyanosis, + edema   MSK:   No joint  "or muscle swelling, tenderness or deformity   Skin:   Skin color, texture, turgor normal, no rashes or lesions   Neurologic/Psychiatric:   CNII-XII intact, normal strength and sensation       throughout, no asterixis; normal affect, memory, judgement     and insight      Results:  Lab Results   Component Value Date     09/14/2024    K 4.3 09/14/2024    CL 97 09/14/2024    CO2 23 09/14/2024    BUN 25 (H) 09/14/2024    CREATININE 7.3 (H) 09/14/2024    CALCIUM 10.4 09/14/2024    ANIONGAP 17 (H) 09/14/2024    ESTGFRAFRICA 19 (L) 09/03/2022    EGFRNONAA 18 (A) 07/31/2022       Lab Results   Component Value Date    CALCIUM 10.4 09/14/2024    PHOS 4.1 09/14/2024       No results for input(s): "WBC", "RBC", "HGB", "HCT", "PLT", "MCV", "MCH", "MCHC" in the last 24 hours.    I have spent >  minutes providing care for this patient for the above diagnoses. These services have included chart/data/imaging review, evaluation, exam, formulation of plan, , note preparation, and discussions with staff involved in this patient's care.    Mando Benitez MD  [facility]  [address] [place] [id number]  [phone] (p)  [phone] (f)  "

## 2024-09-14 NOTE — ASSESSMENT & PLAN NOTE
Patient has high anion gap metabolic acidosis upon admission, beta hydroxybutyrate negative, most likely secondary to ESRD, and also patient taking Diamox at home- unclear etiology.  Patient does not have DKA, no nausea vomiting diarrhea.   Discontinue Diamox, unclear why she is on this medication.

## 2024-09-14 NOTE — CARE UPDATE
09/13/24 1949   Patient Assessment/Suction   Level of Consciousness (AVPU) alert   Respiratory Effort Unlabored   Expansion/Accessory Muscles/Retractions no use of accessory muscles;no retractions   PRE-TX-O2   Device (Oxygen Therapy) room air   SpO2 95 %   Pulse Oximetry Type Intermittent   $ Pulse Oximetry - Multiple Charge Pulse Oximetry - Multiple   Pulse 90   Resp 20   Aerosol Therapy   $ Aerosol Therapy Charges PRN treatment not required

## 2024-09-14 NOTE — DISCHARGE SUMMARY
[facility] Medicine  Discharge Summary      Patient Name: Tabby oHward  MRN: [id number]  [facility]: [id number]  Patient Class: IP- Inpatient  Admission Date: 9/10/2024  Hospital Length of Stay: 1 days  Discharge Date and Time:  09/14/2024 11:00 AM  Attending Physician: Mohini Yusuf MD   Discharging Provider: Mohini Yusuf MD  Primary Care Provider: Melony Kim NP    Primary Care Team: Networked reference to record PCT     HPI:   Tabby Howard is a 34 year old female with a previous medical history of ESRD on dialysis Tue/Thur/Sat, chronic abdominal pain, HTN, Type II Diabetes, who presented to the ED  for left-sided flank pain. HPI is limited to chart review and ED note due to patient somnolence.  Per chart review, patient has multiple similar episodes in the past and has had multiple previous CT scans performed.  Also has had multiple hospitalizations for DKA in the past.  Patient had dialysis earlier today, and following this, she had bilateral flank pain.  Left was worse than right.  She states that she has been taking her long-acting insulin.  She initially presented to [facility].  She had some lab work performed there which did show an anion gap with hyperglycemia that was elevated.  At the outside facility, she repeatedly requested opioid analgesia which was not indicated at that time.  Patient then stated that she would leave and go to another hospital.  [facility] and came to [facility].  Upon arrival, complaining of bilateral with left-greater-than-right flank pain.  States that she has been using her insulin although she has a long history of insulin noncompliance. Patient administered 2mg of ativan and 12.5 of IV phenergan in ED with alleviation of her pain symptoms. She was initiated on an insulin drip for DKA and admitted by hospital medicine for further evaluation and management.     Procedure(s) (LRB):  EGD  (ESOPHAGOGASTRODUODENOSCOPY) (N/A)      Hospital Course:   34-year-old female with frequent admissions, medication non compliance and narcotic seeking and history of type 1 diabetes, gastroparesis, ESRD, presenting here for persistent left flank pain, metabolic acidosis.  Initially feel like to secondary to DKA however it is not DKA, most likely the patient has a high anion gap metabolic acidosis secondary to ESRD/ Diamox.  CTA abdomen and pelvis showed ?  Gastric wall thickening, GI was consulted, Patient underwent EGD which was negative and GI signed off.  Patient developed hypoglycemia while NPO and started on D5. Her glucose is better today and Lantus was resumed.   Review of the chart, patient has frequent hospitalization for abdominal pain that ask for IV Dilaudid very frequently. Patient got dialysis today, she is saying she is in pain. However I have explained that there is nothing on her imaging to suggest pain in her kidneys. Patient was discharged after dialysis today. However she will likely be back again due to noncompliance and pain med seeking behavior.        Goals of Care Treatment Preferences:  Code Status: Full Code    Health care agent: Step-Mother Tanya Howard / Father Garcia Howard  Health care agent number: [phone] / [phone]                   Physical Exam  Constitutional:       Appearance: She is frigid   HENT:      Head: Normocephalic and atraumatic.      Nose: Nose normal.   Eyes:      Extraocular Movements: Extraocular movements intact.      Pupils: Pupils are equal, round, and reactive to light.   Cardiovascular:      Rate and Rhythm: Normal rate and regular rhythm.      Heart sounds: No murmur heard.  Pulmonary:      Effort: Pulmonary effort is normal.      Breath sounds: Normal breath sounds.   Abdominal:      General: Abdomen is flat.      Palpations: Abdomen is soft.      Tenderness: There is subjective pain on the back, both sides.    Musculoskeletal:         General:  Normal range of motion.      Cervical back: Normal range of motion and neck supple.   Skin:     General: Skin is warm and dry.   Neurological:      General: No focal deficit present.      Mental Status: She is alert.   Psychiatric:         Mood and Affect: Mood normal.      Consults:   Consults (From admission, onward)          Status Ordering Provider     Inpatient consult to Gastroenterology  Once        Provider:  Marcelo Zhong MD    Completed HERMINIO FERRARI     Inpatient consult to Registered Dietitian/Nutritionist  Once        Provider:  (Not yet assigned)    Completed RUBENS HAYES     Inpatient consult to Nephrology  Once        Provider:  Mando Benitez MD    Completed RUBENS HAYES            Cardiac/Vascular  QT prolongation      Avoid medications does prolonged QTC    Hypertension  Better controlled     Home meds for hypertension were reviewed and noted below.   Hypertension Medications               hydrALAZINE (APRESOLINE) 50 MG tablet Take 1 tablet (50 mg total) by mouth every 8 (eight) hours.              Chronic congestive heart failure with left ventricular diastolic dysfunction  Patient is identified as having Diastolic (HFpEF) heart failure that is Chronic. CHF is currently controlled. Latest ECHO performed and demonstrates- Results for orders placed during the hospital encounter of 07/16/24    Echo    Interpretation Summary    Left Ventricle: The left ventricle is normal in size. Normal wall thickness. There is normal systolic function with a visually estimated ejection fraction of 65 - 70%. There is normal diastolic function.    Right Ventricle: Normal right ventricular cavity size. Wall thickness is normal. Systolic function is normal.    Pericardium: There is a small effusion. No indication of cardiac tamponade. Evidence includes no chamber collapse, no respiratory chamber variation.    Volume management per dialysis         Renal/  * High anion gap metabolic  acidosis  Patient has high anion gap metabolic acidosis upon admission, beta hydroxybutyrate negative, most likely secondary to ESRD, and also patient taking Diamox at home- unclear etiology.  Patient does not have DKA, no nausea vomiting diarrhea.   Discontinue Diamox, unclear why she is on this medication.            ESRD (end stage renal disease) on dialysis  Resume home dialysis regime TTS, dialysed today prior to discharge     Oncology  Anemia in ESRD (end-stage renal disease)  Anemia is likely due to chronic disease due to ESRD. Most recent hemoglobin and hematocrit are listed below.  Stable       Endocrine  Diabetes  Patient's FSGs are uncontrolled due to hyperglycemia on current medication regimen.  Last A1c reviewed-   Lab Results   Component Value Date    HGBA1C 9.0 (H) 09/10/2024     Most recent fingerstick glucose reviewed-   Recent Labs   Lab 09/13/24  1229 09/13/24  1645 09/13/24 2007 09/13/24  2222   POCTGLUCOSE 213* 306* 318* 278*       Resume home regime on discharge   Patient is noncompliant     GI  Abdominal pain  Chronic, unclear etiology, most likely secondary to gastroparesis?.     CT reviewed shows questionable inflammatory gastritis.  EGD shows no acute findings, GI signed off         Gastroparesis - suspected, unconfirmed  Cont symptomatic Rx   Not a candidate for Reglan due to prolonged QTC           Final Active Diagnoses:    Diagnosis Date Noted POA    PRINCIPAL PROBLEM:  High anion gap metabolic acidosis [E87.29] 04/23/2021 Yes    ESRD (end stage renal disease) on dialysis [N18.6, Z99.2] 09/10/2024 Not Applicable    Diabetes [E11.9] 08/25/2024 Yes    QT prolongation [R94.31] 10/03/2023 Yes    Hypertension [I10] 06/17/2023 Yes    Anemia in ESRD (end-stage renal disease) [N18.6, D63.1] 03/20/2023 Yes    Chronic congestive heart failure with left ventricular diastolic dysfunction [I50.32] 03/02/2023 Yes    Drug-seeking behavior [Z76.5] 01/24/2023 Yes    Abdominal pain [R10.9] 10/26/2022  "Yes    Gastroparesis - suspected, unconfirmed [K31.84] 04/12/2022 Yes     Chronic      Problems Resolved During this Admission:    Diagnosis Date Noted Date Resolved POA    DKA (diabetic ketoacidosis) [E11.10] 08/28/2024 09/11/2024 Yes       Discharged Condition: fair    Disposition: Home or Self Care    Follow Up:   Follow-up Information       Melony Kim, CARO. Schedule an appointment as soon as possible for a visit in 1 week(s).    Specialty: Family Medicine  Why: Call to schedule a hospital follow up appointment  Contact information:  [place] [address]  [phone]                           Patient Instructions:   No discharge procedures on file.    Significant Diagnostic Studies: Labs: CMP   Recent Labs   Lab 09/12/24 2015 09/13/24 0410 09/14/24  0449   NA  --  138 137   K  --  3.7 4.3   CL  --  101 97   CO2  --  25 23   GLU 79 43* 273*   BUN  --  15 25*   CREATININE  --  4.9* 7.3*   CALCIUM  --  9.5 10.4   ANIONGAP  --  12 17*    and CBC No results for input(s): "WBC", "HGB", "HCT", "PLT" in the last 48 hours.    Pending Diagnostic Studies:       Procedure Component Value Units Date/Time    Levetiracetam Level [[id number]] Collected: 09/10/24 2106    Order Status: Sent Lab Status: In process Updated: 09/10/24 2108    Specimen: Blood     Specimen to Pathology - Surgery [[id number]] Collected: 09/13/24 1129    Order Status: Sent Lab Status: No result     Specimen: Tissue            Medications:  Reconciled Home Medications:      Medication List        CONTINUE taking these medications      ALPHAGAN P 0.15 % ophthalmic solution  Generic drug: brimonidine 0.15 % OPTH DROP  Place 1 drop into both eyes 3 (three) times daily.     apixaban 5 mg Tab  Commonly known as: ELIQUIS  Take 1 tablet (5 mg total) by mouth 2 (two) times daily. Patient w/ thrombocytopenia <50, so held during admission, follow-up with hematologist about restarting     atropine 1% 1 % Drop  Commonly known as: ISOPTO " "ATROPINE  Place 1 drop into the left eye 2 (two) times a day.     BD ULTRA-FINE MINI PEN NEEDLE 31 gauge x 3/16" Ndle  Generic drug: pen needle, diabetic  Use as directed with insulin once daily     blood-glucose meter Misc  Commonly known as: TRUE METRIX GLUCOSE METER  Use to check blood glucose     blood-glucose meter,continuous Misc  USE WITH SENSORS     brinzolamide 1 % ophthalmic suspension  Commonly known as: AZOPT  Place1 drop in left eye 3 times a day for 30 days     busPIRone 10 MG tablet  Commonly known as: BUSPAR  Take 1 tablet (10 mg total) by mouth 3 (three) times daily as needed (anxiety).     cetirizine 10 MG tablet  Commonly known as: ZYRTEC  Take 1 tablet every day by oral route.     * DEXCOM G7 SENSOR Heather  Generic drug: blood-glucose sensor  CHANGE EVERY 10 DAYS     * DEXCOM G7 SENSOR Heather  Generic drug: blood-glucose sensor  Use as directed for CGM. Change sensor every 10 days     dorzolamide-timolol 2-0.5% 22.3-6.8 mg/mL ophthalmic solution  Commonly known as: COSOPT  place 1 drop in left eye twice a day  for 30 days     FLUoxetine 40 MG capsule  Take 1 capsule every day by oral route.     fluticasone propionate 50 mcg/actuation nasal spray  Commonly known as: FLONASE ALLERGY RELIEF  Spray 1 spray every day by intranasal route.     gabapentin 300 MG capsule  Commonly known as: NEURONTIN  Take 2 capsules twice a day by oral route for 90 days.     hydrALAZINE 50 MG tablet  Commonly known as: APRESOLINE  Take 1 tablet (50 mg total) by mouth every 8 (eight) hours.     insulin aspart U-100 100 unit/mL (3 mL) Inpn pen  Commonly known as: NovoLOG  Inject 8 Units into the skin 3 (three) times daily with meals.     LANTUS SOLOSTAR U-100 INSULIN 100 unit/mL (3 mL) Inpn pen  Generic drug: insulin glargine U-100 (Lantus)  Inject 22 units every day by subcutaneous route in the evening for 30 days, for diabetes.     levETIRAcetam 500 MG Tab  Commonly known as: KEPPRA  Take 1 tablet (500 mg total) by mouth 2 " (two) times daily.     ondansetron 4 MG Tbdl  Commonly known as: ZOFRAN-ODT  Place 1 tablet (4 mg) on or under the tongue every 8 hours for 10 days.     oxyCODONE-acetaminophen  mg per tablet  Commonly known as: PERCOCET  Take 1 tablet by mouth every 4 (four) hours as needed for Pain.     pantoprazole 40 MG tablet  Commonly known as: PROTONIX  Take 1 tablet (40 mg total) by mouth once daily.     prednisoLONE acetate 1 % Drps  Commonly known as: PRED FORTE  Place 1 drop into the left eye 2 (two) times daily.     promethazine 25 MG tablet  Commonly known as: PHENERGAN  Take 1 tablet (25 mg total) by mouth every 6 (six) hours as needed.     RENVELA 800 mg Tab  Generic drug: sevelamer carbonate  Take 1 tablet (800 mg total) by mouth 3 (three) times daily with meals.     sucralfate 1 gram tablet  Commonly known as: CARAFATE  Take 1 tablet (1 g total) by mouth 4 (four) times daily.     timolol maleate 0.5% 0.5 % Drop  Commonly known as: TIMOPTIC  Place 1 drop in left eye 2 times a day for 30 days     TRUE METRIX GLUCOSE TEST STRIP Strp  Generic drug: blood sugar diagnostic  Use 1 strip to check blood glucose three times daily     TRUEPLUS LANCETS 33 gauge Misc  Generic drug: lancets  Use 1 to check blood glucose three times daily     VENTOLIN HFA 90 mcg/actuation inhaler  Generic drug: albuterol  Inhale 2 puffs every 4 hours by inhalation route.           * This list has 2 medication(s) that are the same as other medications prescribed for you. Read the directions carefully, and ask your doctor or other care provider to review them with you.                STOP taking these medications      acetaZOLAMIDE 250 MG tablet  Commonly known as: DIAMOX     LORazepam 0.5 MG tablet  Commonly known as: ATIVAN              Indwelling Lines/Drains at time of discharge:   Lines/Drains/Airways       Drain  Duration                  Hemodialysis AV Fistula Left upper arm -- days                    Time spent on the discharge of  patient: 40 minutes         Mohini Yusuf MD  Department of Hospital Medicine  [facility]/Surg

## 2024-09-15 VITALS
HEIGHT: 62 IN | HEART RATE: 103 BPM | WEIGHT: 116 LBS | SYSTOLIC BLOOD PRESSURE: 124 MMHG | RESPIRATION RATE: 18 BRPM | TEMPERATURE: 98 F | BODY MASS INDEX: 21.35 KG/M2 | DIASTOLIC BLOOD PRESSURE: 84 MMHG | OXYGEN SATURATION: 100 %

## 2024-09-15 VITALS
HEIGHT: 62 IN | BODY MASS INDEX: 21.35 KG/M2 | TEMPERATURE: 98 F | SYSTOLIC BLOOD PRESSURE: 133 MMHG | RESPIRATION RATE: 22 BRPM | WEIGHT: 116 LBS | HEART RATE: 109 BPM | DIASTOLIC BLOOD PRESSURE: 64 MMHG | OXYGEN SATURATION: 95 %

## 2024-09-15 DIAGNOSIS — M54.50 CHRONIC BILATERAL LOW BACK PAIN WITHOUT SCIATICA: Primary | ICD-10-CM

## 2024-09-15 DIAGNOSIS — G89.29 CHRONIC BILATERAL LOW BACK PAIN WITHOUT SCIATICA: Primary | ICD-10-CM

## 2024-09-15 LAB
ACETONE BLD-MCNC: ABNORMAL MG/DL
ALBUMIN SERPL BCP-MCNC: 4.3 G/DL (ref 3.5–5.2)
ALLENS TEST: ABNORMAL
ALP SERPL-CCNC: 125 U/L (ref 55–135)
ALT SERPL W/O P-5'-P-CCNC: 16 U/L (ref 10–44)
ANION GAP SERPL CALC-SCNC: 27 MMOL/L (ref 8–16)
AST SERPL-CCNC: 21 U/L (ref 10–40)
BASOPHILS # BLD AUTO: 0.07 K/UL (ref 0–0.2)
BASOPHILS NFR BLD: 0.6 % (ref 0–1.9)
BILIRUB SERPL-MCNC: 1.6 MG/DL (ref 0.1–1)
BUN SERPL-MCNC: 22 MG/DL (ref 6–20)
CALCIUM SERPL-MCNC: 11.9 MG/DL (ref 8.7–10.5)
CHLORIDE SERPL-SCNC: 96 MMOL/L (ref 95–110)
CO2 SERPL-SCNC: 19 MMOL/L (ref 23–29)
CREAT SERPL-MCNC: 6.1 MG/DL (ref 0.5–1.4)
DELSYS: ABNORMAL
DIFFERENTIAL METHOD BLD: ABNORMAL
EOSINOPHIL # BLD AUTO: 0 K/UL (ref 0–0.5)
EOSINOPHIL NFR BLD: 0.3 % (ref 0–8)
ERYTHROCYTE [DISTWIDTH] IN BLOOD BY AUTOMATED COUNT: 19.6 % (ref 11.5–14.5)
EST. GFR  (NO RACE VARIABLE): 8.6 ML/MIN/1.73 M^2
FIO2: 21
GLUCOSE SERPL-MCNC: 131 MG/DL (ref 70–110)
HCO3 UR-SCNC: 22 MMOL/L (ref 24–28)
HCT VFR BLD AUTO: 38.7 % (ref 37–48.5)
HGB BLD-MCNC: 13.1 G/DL (ref 12–16)
IMM GRANULOCYTES # BLD AUTO: 0.08 K/UL (ref 0–0.04)
IMM GRANULOCYTES NFR BLD AUTO: 0.7 % (ref 0–0.5)
LYMPHOCYTES # BLD AUTO: 2.1 K/UL (ref 1–4.8)
LYMPHOCYTES NFR BLD: 18.7 % (ref 18–48)
MAGNESIUM SERPL-MCNC: 2.4 MG/DL (ref 1.6–2.6)
MCH RBC QN AUTO: 28.6 PG (ref 27–31)
MCHC RBC AUTO-ENTMCNC: 33.9 G/DL (ref 32–36)
MCV RBC AUTO: 85 FL (ref 82–98)
MODE: ABNORMAL
MONOCYTES # BLD AUTO: 1.1 K/UL (ref 0.3–1)
MONOCYTES NFR BLD: 9.6 % (ref 4–15)
NEUTROPHILS # BLD AUTO: 7.7 K/UL (ref 1.8–7.7)
NEUTROPHILS NFR BLD: 70.1 % (ref 38–73)
NRBC BLD-RTO: 0 /100 WBC
OHS QRS DURATION: 86 MS
OHS QTC CALCULATION: 526 MS
PCO2 BLDA: 38.7 MMHG (ref 35–45)
PH SMN: 7.36 [PH] (ref 7.35–7.45)
PLATELET # BLD AUTO: 309 K/UL (ref 150–450)
PMV BLD AUTO: 9.7 FL (ref 9.2–12.9)
PO2 BLDA: 90 MMHG (ref 80–100)
POC BE: -3 MMOL/L
POC SATURATED O2: 97 % (ref 95–100)
POC TCO2: 23 MMOL/L (ref 23–27)
POCT GLUCOSE: 117 MG/DL (ref 70–110)
POCT GLUCOSE: 124 MG/DL (ref 70–110)
POCT GLUCOSE: 65 MG/DL (ref 70–110)
POTASSIUM SERPL-SCNC: 4.3 MMOL/L (ref 3.5–5.1)
PROT SERPL-MCNC: 9.5 G/DL (ref 6–8.4)
RBC # BLD AUTO: 4.58 M/UL (ref 4–5.4)
SAMPLE: ABNORMAL
SITE: ABNORMAL
SODIUM SERPL-SCNC: 142 MMOL/L (ref 136–145)
TSH SERPL DL<=0.005 MIU/L-ACNC: 1.62 UIU/ML (ref 0.34–5.6)
WBC # BLD AUTO: 11.03 K/UL (ref 3.9–12.7)

## 2024-09-15 PROCEDURE — 85025 COMPLETE CBC W/AUTO DIFF WBC: CPT | Performed by: EMERGENCY MEDICINE

## 2024-09-15 PROCEDURE — 36600 WITHDRAWAL OF ARTERIAL BLOOD: CPT

## 2024-09-15 PROCEDURE — 85347 COAGULATION TIME ACTIVATED: CPT

## 2024-09-15 PROCEDURE — 25000003 PHARM REV CODE 250: Performed by: STUDENT IN AN ORGANIZED HEALTH CARE EDUCATION/TRAINING PROGRAM

## 2024-09-15 PROCEDURE — 63600175 PHARM REV CODE 636 W HCPCS: Performed by: EMERGENCY MEDICINE

## 2024-09-15 PROCEDURE — 96374 THER/PROPH/DIAG INJ IV PUSH: CPT

## 2024-09-15 PROCEDURE — 83735 ASSAY OF MAGNESIUM: CPT | Performed by: EMERGENCY MEDICINE

## 2024-09-15 PROCEDURE — 99284 EMERGENCY DEPT VISIT MOD MDM: CPT | Mod: 25

## 2024-09-15 PROCEDURE — 82962 GLUCOSE BLOOD TEST: CPT

## 2024-09-15 PROCEDURE — 99900035 HC TECH TIME PER 15 MIN (STAT)

## 2024-09-15 PROCEDURE — 25000003 PHARM REV CODE 250: Performed by: EMERGENCY MEDICINE

## 2024-09-15 PROCEDURE — 82009 KETONE BODYS QUAL: CPT | Performed by: EMERGENCY MEDICINE

## 2024-09-15 PROCEDURE — 94761 N-INVAS EAR/PLS OXIMETRY MLT: CPT

## 2024-09-15 PROCEDURE — 96376 TX/PRO/DX INJ SAME DRUG ADON: CPT

## 2024-09-15 PROCEDURE — 80053 COMPREHEN METABOLIC PANEL: CPT | Performed by: EMERGENCY MEDICINE

## 2024-09-15 PROCEDURE — 96375 TX/PRO/DX INJ NEW DRUG ADDON: CPT

## 2024-09-15 RX ORDER — ACETAMINOPHEN 325 MG/1
650 TABLET ORAL
Status: COMPLETED | OUTPATIENT
Start: 2024-09-15 | End: 2024-09-15

## 2024-09-15 RX ORDER — MORPHINE SULFATE 4 MG/ML
4 INJECTION, SOLUTION INTRAMUSCULAR; INTRAVENOUS
Status: DISCONTINUED | OUTPATIENT
Start: 2024-09-15 | End: 2024-09-15

## 2024-09-15 RX ORDER — HYDROMORPHONE HYDROCHLORIDE 1 MG/ML
0.5 INJECTION, SOLUTION INTRAMUSCULAR; INTRAVENOUS; SUBCUTANEOUS
Status: COMPLETED | OUTPATIENT
Start: 2024-09-15 | End: 2024-09-15

## 2024-09-15 RX ORDER — HALOPERIDOL 5 MG/ML
5 INJECTION INTRAMUSCULAR
Status: COMPLETED | OUTPATIENT
Start: 2024-09-15 | End: 2024-09-15

## 2024-09-15 RX ORDER — MORPHINE SULFATE 4 MG/ML
4 INJECTION, SOLUTION INTRAMUSCULAR; INTRAVENOUS
Status: COMPLETED | OUTPATIENT
Start: 2024-09-15 | End: 2024-09-15

## 2024-09-15 RX ORDER — HYDROMORPHONE HYDROCHLORIDE 1 MG/ML
1 INJECTION, SOLUTION INTRAMUSCULAR; INTRAVENOUS; SUBCUTANEOUS
Status: COMPLETED | OUTPATIENT
Start: 2024-09-15 | End: 2024-09-15

## 2024-09-15 RX ADMIN — ACETAMINOPHEN 650 MG: 325 TABLET ORAL at 04:09

## 2024-09-15 RX ADMIN — LIDOCAINE 1 PATCH: 50 PATCH TOPICAL at 04:09

## 2024-09-15 RX ADMIN — HYDROMORPHONE HYDROCHLORIDE 1 MG: 1 INJECTION, SOLUTION INTRAMUSCULAR; INTRAVENOUS; SUBCUTANEOUS at 09:09

## 2024-09-15 RX ADMIN — ACETAMINOPHEN 650 MG: 325 TABLET ORAL at 09:09

## 2024-09-15 RX ADMIN — HYDROMORPHONE HYDROCHLORIDE 0.5 MG: 1 INJECTION, SOLUTION INTRAMUSCULAR; INTRAVENOUS; SUBCUTANEOUS at 03:09

## 2024-09-15 RX ADMIN — MORPHINE SULFATE 4 MG: 4 INJECTION, SOLUTION INTRAMUSCULAR; INTRAVENOUS at 04:09

## 2024-09-15 RX ADMIN — HALOPERIDOL LACTATE 5 MG: 5 INJECTION, SOLUTION INTRAMUSCULAR at 09:09

## 2024-09-15 NOTE — DISCHARGE INSTRUCTIONS
Continue previously prescribed medications and treatments.  Make sure you go to dialysis tomorrow.  Follow-up with all of your doctors, and make an appointment to see pain management.  Return here as needed or if worse in any way.

## 2024-09-15 NOTE — DISCHARGE INSTRUCTIONS
You were seen in the emergency department for your weakness and back pain.  Your blood sugar was a little low when you arrived but it improved after eating.  It is very important you make a an appointment with your primary care doctor to discuss long-term pain management.

## 2024-09-15 NOTE — ED PROVIDER NOTES
"Encounter Date: 9/15/2024       History     Chief Complaint   Patient presents with    Back Pain     Pt. C/o mid back pain . Pt. States, "My kidneys and back hurts.".      34-year-old female, history of end-stage renal disease on dialysis, gastroparesis, heart failure, type 2 diabetes mellitus etc. here from home via EMS complaining of bilateral flank pain.  This is a common occurrence for this patient.  Chart reflects that she was recently admitted to [facility] for DKA.  She was released yesterday.  Last night she went back to that facility complaining of pain.  Evidently labs were performed and they were not significantly abnormal.  During her recent hospital stay she underwent EGD and a CT of the abdomen pelvis, both of which were good.  MD note from her visit there last night stated that they offered her Tylenol and a lidocaine patch for pain but she refused.  She was discharged home.      Review of patient's allergies indicates:   Allergen Reactions    Droperidol Hives    Penicillins Hives    Droperidol (bulk) Anxiety     STATES "FREAKS OUT".  TOLERATES HALDOL PRIOR TO ARRIVAL AT ANOTHER FACILITY TODAY.      Past Medical History:   Diagnosis Date    ESRD on hemodialysis 2022    Gastritis 2022    EGD was 21    Gastroparesis 2022    has not had Emptying study    Heart failure with preserved ejection fraction 2022    EF 55% on 3/22    History of supraventricular tachycardia     Hyperkalemia 2022    Hypertensive emergency 2022    Sickle cell trait 2022    Type 2 diabetes mellitus      Past Surgical History:   Procedure Laterality Date     SECTION      x 3    COLONOSCOPY      COLONOSCOPY N/A 2022    Procedure: COLONOSCOPY;  Surgeon: Jagdeep Cedeno MD;  Location: [facility];  Service: Endoscopy;  Laterality: N/A;    DESTRUCTION, CILIARY BODY, USING LASER Left 2024    Procedure: Cyclophotocoagulation G-probe, retrobulbar chlorpromazine " left eye;  Surgeon: Fidel Wise MD;  Location: [address];  Service: Ophthalmology;  Laterality: Left;    ENDOSCOPIC ULTRASOUND OF UPPER GASTROINTESTINAL TRACT N/A 12/16/2023    Procedure: ULTRASOUND, UPPER GI TRACT, ENDOSCOPIC;  Surgeon: Micky Paredes III, MD;  Location: [facility];  Service: Endoscopy;  Laterality: N/A;    ESOPHAGOGASTRODUODENOSCOPY N/A 10/18/2019    Procedure: ESOPHAGOGASTRODUODENOSCOPY (EGD);  Surgeon: Gianluca Mendez MD;  Location: [facility];  Service: Endoscopy;  Laterality: N/A;    ESOPHAGOGASTRODUODENOSCOPY N/A 08/24/2022    Procedure: EGD (ESOPHAGOGASTRODUODENOSCOPY);  Surgeon: Micky Paredes III, MD;  Location: [facility];  Service: Endoscopy;  Laterality: N/A;    ESOPHAGOGASTRODUODENOSCOPY N/A 12/05/2022    Procedure: EGD (ESOPHAGOGASTRODUODENOSCOPY);  Surgeon: Marcelo Zhong MD;  Location: [facility];  Service: Endoscopy;  Laterality: N/A;    ESOPHAGOGASTRODUODENOSCOPY N/A 9/13/2024    Procedure: EGD (ESOPHAGOGASTRODUODENOSCOPY);  Surgeon: Marcelo Zhong MD;  Location: [facility];  Service: Endoscopy;  Laterality: N/A;    EYE SURGERY      INSERTION, CATHETER, TUNNELED N/A 06/17/2023    Procedure: Insertion,catheter,tunneled;  Surgeon: Carlos Thurman Jr., MD;  Location: [facility];  Service: General;  Laterality: N/A;    LAPAROSCOPIC CHOLECYSTECTOMY N/A 07/30/2022    Procedure: CHOLECYSTECTOMY, LAPAROSCOPIC;  Surgeon: Grey Perez MD;  Location: [facility] OR;  Service: General;  Laterality: N/A;    PLACEMENT OF DUAL-LUMEN VASCULAR CATHETER Left 07/12/2022    Procedure: INSERTION-CATHETER-JOSEPH;  Surgeon: Dionte Gan MD;  Location: [facility] OR;  Service: General;  Laterality: Left;    PLACEMENT OF DUAL-LUMEN VASCULAR CATHETER Right 07/26/2022    Procedure: INSERTION-CATHETER-Hemosplit;  Surgeon: Dionte Gan MD;  Location: [facility] OR;  Service: General;  Laterality: Right;     Family History   Problem Relation Name Age of Onset    Diabetes Mother      Diabetes  Father       Social History     Tobacco Use    Smoking status: Never    Smokeless tobacco: Never   Substance Use Topics    Alcohol use: Not Currently    Drug use: No     Review of Systems   Musculoskeletal:  Positive for back pain.   All other systems reviewed and are negative.      Physical Exam     Initial Vitals [09/15/24 0857]   BP Pulse Resp Temp SpO2   133/64 109 20 97.5 °F (36.4 °C) 98 %      MAP       --         Physical Exam    Nursing note and vitals reviewed.  Constitutional: She appears well-developed and well-nourished. She is not diaphoretic. She appears distressed (Writhing around in the bed secondary to pain).   HENT:   Head: Normocephalic and atraumatic.   Nose: Nose normal.   Mouth/Throat: Oropharynx is clear and moist. No oropharyngeal exudate.   Eyes: Conjunctivae and EOM are normal. Pupils are equal, round, and reactive to light. No scleral icterus.   Neck: Neck supple. No JVD present.   Normal range of motion.  Cardiovascular:  Normal rate, regular rhythm, normal heart sounds and intact distal pulses.           No murmur heard.  Pulmonary/Chest: Breath sounds normal. No stridor. No respiratory distress. She has no wheezes. She has no rhonchi. She has no rales.   Abdominal: Abdomen is soft. Bowel sounds are normal. She exhibits no distension. There is no abdominal tenderness.   Musculoskeletal:         General: No tenderness or edema. Normal range of motion.      Cervical back: Normal range of motion and neck supple.     Neurological: She is alert and oriented to person, place, and time. She has normal strength. No cranial nerve deficit or sensory deficit. GCS score is 15. GCS eye subscore is 4. GCS verbal subscore is 5. GCS motor subscore is 6.   Skin: Skin is warm and dry. Capillary refill takes less than 2 seconds. No rash noted. No erythema.   Psychiatric: She has a normal mood and affect. Her behavior is normal.         ED Course   Procedures  Labs Reviewed   CBC W/ AUTO DIFFERENTIAL -  Abnormal       Result Value    WBC 11.03      RBC 4.58      Hemoglobin 13.1      Hematocrit 38.7      MCV 85      MCH 28.6      MCHC 33.9      RDW 19.6 (*)     Platelets 309      MPV 9.7      Immature Granulocytes 0.7 (*)     Gran # (ANC) 7.7      Immature Grans (Abs) 0.08 (*)     Lymph # 2.1      Mono # 1.1 (*)     Eos # 0.0      Baso # 0.07      nRBC 0      Gran % 70.1      Lymph % 18.7      Mono % 9.6      Eosinophil % 0.3      Basophil % 0.6      Differential Method Automated     COMPREHENSIVE METABOLIC PANEL - Abnormal    Sodium 142      Potassium 4.3      Chloride 96      CO2 19 (*)     Glucose 131 (*)     BUN 22 (*)     Creatinine 6.1 (*)     Calcium 11.9 (*)     Total Protein 9.5 (*)     Albumin 4.3      Total Bilirubin 1.6 (*)     Alkaline Phosphatase 125      AST 21      ALT 16      eGFR 8.6 (*)     Anion Gap 27 (*)    ACETONE - Abnormal    Acetone, Bld Small (*)    POCT GLUCOSE - Abnormal    POCT Glucose 117 (*)    ISTAT PROCEDURE - Abnormal    POC PH 7.362      POC PCO2 38.7      POC PO2 90      POC HCO3 22.0 (*)     POC BE -3 (*)     POC SATURATED O2 97      POC TCO2 23      Sample ARTERIAL      Site RR      Allens Test N/A      DelSys Room Air      Mode SPONT      FiO2 21     MAGNESIUM    Magnesium 2.4            Imaging Results    None          Medications   haloperidol lactate injection 5 mg (5 mg Intravenous Given 9/15/24 0917)   acetaminophen tablet 650 mg (650 mg Oral Given 9/15/24 0918)   HYDROmorphone injection 1 mg (1 mg Intravenous Given 9/15/24 0917)     Medical Decision Making  Differential includes drug-seeking behavior, chronic pain, pyelonephritis, DKA, etc.    Labs show no evidence of infectious process.  BUN and creatinine are elevated, as expected.  She was due for dialysis tomorrow.  Potassium is normal.  No evidence for DKA.  ABG normal, not acidotic.  Patient was given medications for her discomfort.  She will be discharged home with instructions to keep her dialysis appointment  tomorrow, follow-up with her doctors, and follow-up with pain management.    Amount and/or Complexity of Data Reviewed  Labs: ordered.    Risk  OTC drugs.  Prescription drug management.                                      Clinical Impression:  Final diagnoses:  [M54.50, G89.29] Chronic bilateral low back pain without sciatica (Primary)          ED Disposition Condition    Discharge Stable          ED Prescriptions    None       Follow-up Information       Follow up With Specialties Details Why Contact Info    Melony Kim NP Family Medicine Call in 1 day  [address] [place] [id number] [phone]      Your nephrologist  Call in 1 day      [facility] Dept Emergency Medicine  If symptoms worsen [address] [place] [address] [phone]    pain management  Schedule an appointment as soon as possible for a visit                Ad Berman MD  09/15/24 3270

## 2024-09-15 NOTE — ED PROVIDER NOTES
"Encounter Date: 2024       History     Chief Complaint   Patient presents with    Weakness     Patient c/o weakness x 1 day. Last dialysis was today     HPI  Tabby Howard is a 34 y.o. F w/ PMHx T2 DM, ESRD on HD TTS, chronic abdominal pain presenting for bilateral flank pain.    Discharged from [facility] at noon today. Presented 9/10 for flank pain, found to be in DKA and admitted.  Inpatient workup included CTA abdomen and pelvis notable for gastric wall thickening, GI consulted and patient underwent EGD which was negative.  Last dialysis session this morning.    Reports feeling improved on discharge before onset of bilateral flank pain and generalized weakness afternoon.  Denies syncope.  Denies fever, chills, shortness of breath, chest pain.    Review of patient's allergies indicates:   Allergen Reactions    Droperidol Hives    Penicillins Hives    Droperidol (bulk) Anxiety     STATES "FREAKS OUT".  TOLERATES HALDOL PRIOR TO ARRIVAL AT ANOTHER FACILITY TODAY.      Past Medical History:   Diagnosis Date    ESRD on hemodialysis 2022    Gastritis 2022    EGD was 22    Gastroparesis 2022    has not had Emptying study    Heart failure with preserved ejection fraction 2022    EF 55% on 3/22    History of supraventricular tachycardia     Hyperkalemia 2022    Hypertensive emergency 2022    Sickle cell trait 2022    Type 2 diabetes mellitus      Past Surgical History:   Procedure Laterality Date     SECTION      x 3    COLONOSCOPY      COLONOSCOPY N/A 2022    Procedure: COLONOSCOPY;  Surgeon: Jagdeep Cedeno MD;  Location: [facility];  Service: Endoscopy;  Laterality: N/A;    DESTRUCTION, CILIARY BODY, USING LASER Left 2024    Procedure: Cyclophotocoagulation G-probe, retrobulbar chlorpromazine left eye;  Surgeon: Fidel Wise MD;  Location: [facility] OR [address] [place] [address];  Service: Ophthalmology;  Laterality: Left;    ENDOSCOPIC ULTRASOUND OF UPPER GASTROINTESTINAL " TRACT N/A 12/16/2023    Procedure: ULTRASOUND, UPPER GI TRACT, ENDOSCOPIC;  Surgeon: Micky Paredes III, MD;  Location: [facility];  Service: Endoscopy;  Laterality: N/A;    ESOPHAGOGASTRODUODENOSCOPY N/A 10/18/2019    Procedure: ESOPHAGOGASTRODUODENOSCOPY (EGD);  Surgeon: Gianluca Mendez MD;  Location: [facility];  Service: Endoscopy;  Laterality: N/A;    ESOPHAGOGASTRODUODENOSCOPY N/A 08/24/2022    Procedure: EGD (ESOPHAGOGASTRODUODENOSCOPY);  Surgeon: Micky Paredes III, MD;  Location: [facility];  Service: Endoscopy;  Laterality: N/A;    ESOPHAGOGASTRODUODENOSCOPY N/A 12/05/2022    Procedure: EGD (ESOPHAGOGASTRODUODENOSCOPY);  Surgeon: Marcelo Zhong MD;  Location: [facility];  Service: Endoscopy;  Laterality: N/A;    ESOPHAGOGASTRODUODENOSCOPY N/A 9/13/2024    Procedure: EGD (ESOPHAGOGASTRODUODENOSCOPY);  Surgeon: Marcelo Zhong MD;  Location: [facility];  Service: Endoscopy;  Laterality: N/A;    EYE SURGERY      INSERTION, CATHETER, TUNNELED N/A 06/17/2023    Procedure: Insertion,catheter,tunneled;  Surgeon: Carlos Thurman Jr., MD;  Location: [facility];  Service: General;  Laterality: N/A;    LAPAROSCOPIC CHOLECYSTECTOMY N/A 07/30/2022    Procedure: CHOLECYSTECTOMY, LAPAROSCOPIC;  Surgeon: Grey Perez MD;  Location: [facility] OR;  Service: General;  Laterality: N/A;    PLACEMENT OF DUAL-LUMEN VASCULAR CATHETER Left 07/12/2022    Procedure: INSERTION-CATHETER-JOSEPH;  Surgeon: Dionte Gan MD;  Location: [facility] OR;  Service: General;  Laterality: Left;    PLACEMENT OF DUAL-LUMEN VASCULAR CATHETER Right 07/26/2022    Procedure: INSERTION-CATHETER-Hemosplit;  Surgeon: Dionte Gan MD;  Location: [facility] OR;  Service: General;  Laterality: Right;     Family History   Problem Relation Name Age of Onset    Diabetes Mother      Diabetes Father       Social History     Tobacco Use    Smoking status: Never    Smokeless tobacco: Never   Substance Use Topics    Alcohol use: Not Currently    Drug use:  No     Review of Systems   Constitutional:  Negative for chills and fever.   HENT:  Negative for congestion and rhinorrhea.    Respiratory:  Negative for cough and shortness of breath.    Cardiovascular:  Negative for chest pain and palpitations.   Gastrointestinal:  Positive for abdominal pain. Negative for diarrhea and vomiting.   Genitourinary:  Positive for flank pain (Bilateral). Negative for vaginal discharge.   Musculoskeletal:  Negative for joint swelling and neck pain.   Neurological:  Positive for weakness (Generalized). Negative for light-headedness.   Psychiatric/Behavioral:  Negative for agitation and confusion.        Physical Exam     Initial Vitals   BP Pulse Resp Temp SpO2   09/14/24 2036 09/14/24 2036 09/14/24 2036 09/14/24 2213 09/14/24 2036   (!) 177/108 79 (!) 22 (!) 95.3 °F (35.2 °C) 100 %      MAP       --                Physical Exam    Nursing note and vitals reviewed.  HENT:   Head: Normocephalic and atraumatic.   Right Ear: External ear normal.   Left Ear: External ear normal.   Eyes: Conjunctivae are normal. Right eye exhibits no discharge. Left eye exhibits no discharge.   Neck: Neck supple.   Normal range of motion.  Cardiovascular:  Normal rate, regular rhythm, normal heart sounds and intact distal pulses.           Pulmonary/Chest: Breath sounds normal. No respiratory distress. She has no wheezes.   Abdominal: Abdomen is soft. She exhibits no distension. There is no abdominal tenderness.   Reproducible bilateral flank tenderness (L> R) There is no rebound and no guarding.   Musculoskeletal:         General: No edema. Normal range of motion.      Cervical back: Normal range of motion and neck supple.      Comments: Fistula LUE with palpable thrill     Neurological: She is alert and oriented to person, place, and time.   Skin: Skin is warm and dry.         ED Course   Procedures  Labs Reviewed   BASIC METABOLIC PANEL - Abnormal       Result Value    Sodium 135 (*)     Potassium 3.9       Chloride 96      CO2 29      Glucose 66 (*)     BUN 20      Creatinine 4.5 (*)     Calcium 9.4      Anion Gap 10      eGFR 12.4 (*)     Narrative:     Release to patient->Immediate   COMPREHENSIVE METABOLIC PANEL - Abnormal    Sodium 135 (*)     Potassium 3.9      Chloride 96      CO2 29      Glucose 66 (*)     BUN 20      Creatinine 4.5 (*)     Calcium 9.4      Total Protein 7.4      Albumin 3.9      Total Bilirubin 1.0      Alkaline Phosphatase 95      AST 15      ALT 9 (*)     eGFR 12.4 (*)     Anion Gap 10      Narrative:     Release to patient->Immediate   POCT GLUCOSE - Abnormal    POCT Glucose 65 (*)    POCT GLUCOSE - Abnormal    POCT Glucose 124 (*)    LIPASE    Lipase 5      Narrative:     Release to patient->Immediate   HCG, QUANTITATIVE    HCG Quant 1.62      Narrative:     Release to patient->Immediate   MAGNESIUM    Magnesium 2.2      Narrative:     Release to patient->Immediate   PHOSPHORUS    Phosphorus 3.0      Narrative:     Release to patient->Immediate   TSH    TSH 1.618     LACTIC ACID, PLASMA    Lactate (Lactic Acid) 0.9     POCT GLUCOSE    POCT Glucose 80            Imaging Results              X-Ray Chest 1 View (Final result)  Result time 09/14/24 22:47:16      Final result by Jeovany Herrmann MD (09/14/24 22:47:16)                   Impression:      As above.      Electronically signed by: Jeovany Herrmann  Date:    09/14/2024  Time:    22:47               Narrative:    EXAMINATION:  XR CHEST 1 VIEW    CLINICAL HISTORY:  Shortness of breath    TECHNIQUE:  Single frontal view of the chest was performed.    COMPARISON:  08/28/2024    FINDINGS:  Enlarged cardiac silhouette.  No focal consolidation or pleural effusion.                                       Medications   LIDOcaine 5 % patch 1 patch (1 patch Transdermal Not Given 9/14/24 2210)   acetaminophen tablet 650 mg (650 mg Oral Not Given 9/14/24 2210)   promethazine (PHENERGAN) 12.5 mg in 0.9% NaCl 50 mL IVPB (0 mg Intravenous  Stopped 9/15/24 0022)     Medical Decision Making  Amount and/or Complexity of Data Reviewed  External Data Reviewed: notes.     Details: Reviewed notes from recent admission at [facility]  Labs: ordered. Decision-making details documented in ED Course.  Radiology: ordered and independent interpretation performed. Decision-making details documented in ED Course.  ECG/medicine tests: ordered and independent interpretation performed. Decision-making details documented in ED Course.    Risk  OTC drugs.  Prescription drug management.      34 y.o. F w/ PMHx T2 DM, ESRD on HD TTS, chronic abdominal pain presenting for bilateral flank pain since this evening.  Discharged at noon today from [facility] after admission for DKA after presenting for bilateral flank pain. Underwent CT abdomen/pelvis and EGD w/ GI only notable for mild gastritis.  Hypertensive on arrival, improved after rooming.  Exam notable for bilateral flank tenderness (L>R).  Soft nontender abdomen.  Denies any trauma or falls.    Recent CT abdomen pelvis on 09/11 after presentation for similar flank pain, no indication to repeat CT imaging at this time.  Patient anuric, again likely pyelonephritis as etiology.  No history of nephrolithiasis and no stones seen on recent CT imaging.  Denies inciting trauma to the pain.  Pain is similar in character to prior episodes of flank pain.  Blood work without evidence of DKA, acute electrolyte abnormality.  Initial temperature 95.3° F by rectal temp.  TSH within normal limits.  Lactate within normal limits, low suspicion infectious etiology.  Temperature improved on Lilliana Hugger.  Later maintaining normal temperature off of Lilliana Hugger.  No indication for emergent dialysis.    CXR (per my interpretation) with cardiomegaly. no consolidation, effusion or pneumothorax.  EKG (per my interpretation) NSR at 70 bpm, no STEMI or ectopy.    Initial POCT glucose 80, decreased to 66 on labs.  Patient reports she has not eaten all day.   Tolerating p.o. intake with improvement in glucose to 120s.    After Tylenol and lidocaine patch for pain, patient refused.  Patient states that only Dilaudid works for her pain.  Discussed her reassuring workup and that opioid analgesia not indicated at this point.  Advised to follow up with primary care doctor to facilitate pain management.  Patient is stable and discharged with strict return precautions.    Tigre Suarez MD  [facility] Emergency Medicine PGY-4                                      Clinical Impression:  Final diagnoses:  [R10.9] Flank pain (Primary)  [R53.1] Weakness          ED Disposition Condition    Discharge Stable          ED Prescriptions    None       Follow-up Information       Follow up With Specialties Details Why Contact Info Additional Information    Melony Kim, NP Family Medicine  Please make an appointment with your primary care doctor as soon as possible we discussed pain management [address] [facility] [id number]  [phone]       [facility] - Emergency Dept Emergency Medicine  Return to the emergency department for difficulty breathing, chest pain, passing out, or any other new or concerning symptoms [address] [facility] [address]  [phone] 1st floor             Tigre Suarez MD  Resident  09/15/24 [id number]

## 2024-09-16 LAB — LEVETIRACETAM SERPL-MCNC: <1 UG/ML (ref 3–60)

## 2024-09-17 ENCOUNTER — HOSPITAL ENCOUNTER (EMERGENCY)
Facility: HOSPITAL | Age: 34
Discharge: HOME | End: 2024-09-17
Attending: STUDENT IN AN ORGANIZED HEALTH CARE EDUCATION/TRAINING PROGRAM
Payer: MEDICAID

## 2024-09-17 VITALS
WEIGHT: 116 LBS | HEART RATE: 92 BPM | HEIGHT: 62 IN | RESPIRATION RATE: 20 BRPM | BODY MASS INDEX: 21.35 KG/M2 | TEMPERATURE: 98 F | OXYGEN SATURATION: 99 % | DIASTOLIC BLOOD PRESSURE: 99 MMHG | SYSTOLIC BLOOD PRESSURE: 180 MMHG

## 2024-09-17 DIAGNOSIS — M54.50 CHRONIC BILATERAL LOW BACK PAIN, UNSPECIFIED WHETHER SCIATICA PRESENT: Primary | ICD-10-CM

## 2024-09-17 DIAGNOSIS — N18.6 ESRD (END STAGE RENAL DISEASE) ON DIALYSIS: ICD-10-CM

## 2024-09-17 DIAGNOSIS — Z91.199 NON-COMPLIANT PATIENT: ICD-10-CM

## 2024-09-17 DIAGNOSIS — R73.9 HYPERGLYCEMIA: ICD-10-CM

## 2024-09-17 DIAGNOSIS — D64.9 ANEMIA, UNSPECIFIED TYPE: ICD-10-CM

## 2024-09-17 DIAGNOSIS — G89.29 CHRONIC BILATERAL LOW BACK PAIN, UNSPECIFIED WHETHER SCIATICA PRESENT: Primary | ICD-10-CM

## 2024-09-17 DIAGNOSIS — Z99.2 ESRD (END STAGE RENAL DISEASE) ON DIALYSIS: ICD-10-CM

## 2024-09-17 DIAGNOSIS — Z76.5 DRUG-SEEKING BEHAVIOR: ICD-10-CM

## 2024-09-17 LAB
ALBUMIN SERPL BCP-MCNC: 3.8 G/DL (ref 3.5–5.2)
ALLENS TEST: ABNORMAL
ALP SERPL-CCNC: 99 U/L (ref 55–135)
ALT SERPL W/O P-5'-P-CCNC: 11 U/L (ref 10–44)
ANION GAP SERPL CALC-SCNC: 22 MMOL/L (ref 8–16)
AST SERPL-CCNC: 15 U/L (ref 10–40)
B-OH-BUTYR BLD STRIP-SCNC: 3 MMOL/L (ref 0–0.5)
BASOPHILS # BLD AUTO: 0.05 K/UL (ref 0–0.2)
BASOPHILS NFR BLD: 0.7 % (ref 0–1.9)
BILIRUB SERPL-MCNC: 1 MG/DL (ref 0.1–1)
BUN SERPL-MCNC: 47 MG/DL (ref 6–20)
CALCIUM SERPL-MCNC: 9.6 MG/DL (ref 8.7–10.5)
CHLORIDE SERPL-SCNC: 92 MMOL/L (ref 95–110)
CO2 SERPL-SCNC: 19 MMOL/L (ref 23–29)
CREAT SERPL-MCNC: 10.5 MG/DL (ref 0.5–1.4)
DELSYS: ABNORMAL
DIFFERENTIAL METHOD BLD: ABNORMAL
EOSINOPHIL # BLD AUTO: 0.1 K/UL (ref 0–0.5)
EOSINOPHIL NFR BLD: 0.8 % (ref 0–8)
ERYTHROCYTE [DISTWIDTH] IN BLOOD BY AUTOMATED COUNT: 18.7 % (ref 11.5–14.5)
EST. GFR  (NO RACE VARIABLE): 4 ML/MIN/1.73 M^2
GLUCOSE SERPL-MCNC: 350 MG/DL (ref 70–110)
HCO3 UR-SCNC: 24.1 MMOL/L (ref 24–28)
HCT VFR BLD AUTO: 29.1 % (ref 37–48.5)
HGB BLD-MCNC: 9.9 G/DL (ref 12–16)
IMM GRANULOCYTES # BLD AUTO: 0.03 K/UL (ref 0–0.04)
IMM GRANULOCYTES NFR BLD AUTO: 0.4 % (ref 0–0.5)
LYMPHOCYTES # BLD AUTO: 0.9 K/UL (ref 1–4.8)
LYMPHOCYTES NFR BLD: 12 % (ref 18–48)
MCH RBC QN AUTO: 28.7 PG (ref 27–31)
MCHC RBC AUTO-ENTMCNC: 34 G/DL (ref 32–36)
MCV RBC AUTO: 84 FL (ref 82–98)
MODE: ABNORMAL
MONOCYTES # BLD AUTO: 0.6 K/UL (ref 0.3–1)
MONOCYTES NFR BLD: 8.5 % (ref 4–15)
NEUTROPHILS # BLD AUTO: 5.8 K/UL (ref 1.8–7.7)
NEUTROPHILS NFR BLD: 77.6 % (ref 38–73)
NRBC BLD-RTO: 0 /100 WBC
PCO2 BLDA: 31.9 MMHG (ref 35–45)
PH SMN: 7.49 [PH] (ref 7.35–7.45)
PLATELET # BLD AUTO: 176 K/UL (ref 150–450)
PLATELET BLD QL SMEAR: ABNORMAL
PMV BLD AUTO: 9.6 FL (ref 9.2–12.9)
PO2 BLDA: 26 MMHG (ref 40–60)
POC BE: 1 MMOL/L
POC SATURATED O2: 53 % (ref 95–100)
POC TCO2: 25 MMOL/L (ref 24–29)
POCT GLUCOSE: 302 MG/DL (ref 70–110)
POTASSIUM SERPL-SCNC: 4.4 MMOL/L (ref 3.5–5.1)
PROT SERPL-MCNC: 7.7 G/DL (ref 6–8.4)
RBC # BLD AUTO: 3.45 M/UL (ref 4–5.4)
SAMPLE: ABNORMAL
SITE: ABNORMAL
SODIUM SERPL-SCNC: 133 MMOL/L (ref 136–145)
SP02: 100
WBC # BLD AUTO: 7.52 K/UL (ref 3.9–12.7)

## 2024-09-17 PROCEDURE — 94760 N-INVAS EAR/PLS OXIMETRY 1: CPT | Mod: XB

## 2024-09-17 PROCEDURE — 36415 COLL VENOUS BLD VENIPUNCTURE: CPT

## 2024-09-17 PROCEDURE — 80053 COMPREHEN METABOLIC PANEL: CPT

## 2024-09-17 PROCEDURE — 63600175 PHARM REV CODE 636 W HCPCS

## 2024-09-17 PROCEDURE — 99285 EMERGENCY DEPT VISIT HI MDM: CPT | Mod: 25

## 2024-09-17 PROCEDURE — 94761 N-INVAS EAR/PLS OXIMETRY MLT: CPT | Mod: XB

## 2024-09-17 PROCEDURE — 96365 THER/PROPH/DIAG IV INF INIT: CPT

## 2024-09-17 PROCEDURE — 85025 COMPLETE CBC W/AUTO DIFF WBC: CPT

## 2024-09-17 PROCEDURE — 82010 KETONE BODYS QUAN: CPT

## 2024-09-17 PROCEDURE — 82803 BLOOD GASES ANY COMBINATION: CPT

## 2024-09-17 PROCEDURE — 93005 ELECTROCARDIOGRAM TRACING: CPT

## 2024-09-17 PROCEDURE — 82962 GLUCOSE BLOOD TEST: CPT

## 2024-09-17 PROCEDURE — 25000003 PHARM REV CODE 250

## 2024-09-17 PROCEDURE — 99900035 HC TECH TIME PER 15 MIN (STAT)

## 2024-09-17 PROCEDURE — 96375 TX/PRO/DX INJ NEW DRUG ADDON: CPT

## 2024-09-17 RX ORDER — MORPHINE SULFATE 4 MG/ML
4 INJECTION, SOLUTION INTRAMUSCULAR; INTRAVENOUS
Status: COMPLETED | OUTPATIENT
Start: 2024-09-17 | End: 2024-09-17

## 2024-09-17 RX ADMIN — PROMETHAZINE HYDROCHLORIDE 25 MG: 25 INJECTION INTRAMUSCULAR; INTRAVENOUS at 01:09

## 2024-09-17 RX ADMIN — MORPHINE SULFATE 4 MG: 4 INJECTION INTRAVENOUS at 01:09

## 2024-09-17 NOTE — ED NOTES
Pt refuses to cooperate with obtaining accurate VS. Continues to move while trying to obtain accurate BP.

## 2024-09-17 NOTE — DISCHARGE INSTRUCTIONS
Thank you for coming to our Emergency Department today. It is important to remember that some problems or medical conditions are difficult to diagnose and may not be found or addressed during your Emergency Department visit.  These conditions often start with non-specific symptoms and can only be diagnosed on follow up visits with your primary care physician or specialist when the symptoms continue or change. Please remember that all medical conditions can change, and we cannot predict how you will be feeling tomorrow or the next day. Return to the ER with any questions/concerns, new/concerning symptoms, worsening or failure to improve.       Be sure to follow up with your primary care doctor and review all labs/imaging/tests that were performed during your ER visit with them. It is very common for us to identify non-emergent incidental findings which must be followed up with your primary care physician.  Some labs/imaging/tests may be outside of the normal range, and require non-emergent follow-up and/or further investigation/treatment/procedures/testing to help diagnose/exclude/prevent complications or other potentially serious medical conditions. Some abnormalities may not have been discussed or addressed during your ER visit. Some lab results may not return during your ER visit but can be accessible by downloading the free [facility] or by visiting [email or web address] . It is important for you to review all labs/imaging/tests which are outside of the normal range with your physician.    An ER visit does not replace a primary care visit, and many screening tests or follow-up tests cannot be ordered by an ER doctor or performed by the ER. Some tests may even require pre-approval.    If you do not have a primary care doctor, you may contact the one listed on your discharge paperwork or you may also call the [facility] Appointment Desk at [phone] , or [address] [place] [address] at  [phone] to schedule  an appointment, or establish care with a primary care doctor or even a specialist and to obtain information about local resources. It is important to your health that you have a primary care doctor.    Please take all medications as directed. We have done our best to select a medication for you that will treat your condition however, all medications may potentially have side-effects and it is impossible to predict which medications may give you side-effects or what those side-effects (if any) those medications may give you.  If you feel that you are having a negative effect or side-effect of any medication you should stop taking those medications immediately and seek medical attention. If you feel that you are having a life-threatening reaction call 911.        Do not drive, swim, climb to height, take a bath, operate heavy machinery, drink alcohol or take potentially sedating medications, sign any legal documents or make any important decisions for 24 hours if you have received any pain medications, sedatives or mood altering drugs during your ER visit or within 24 hours of taking them if they have been prescribed to you.     You can find additional resources for Dentists, hearing aids, durable medical equipment, low cost pharmacies and other resources at [email or web address]   yes

## 2024-09-17 NOTE — ED PROVIDER NOTES
"Encounter Date: 2024       History     Chief Complaint   Patient presents with    Back Pain     Did not get dialysis     34-year-old female with a past medical history of hypertension, diabetes, CHF, end-stage renal disease on dialysis Tuesday, , and  presents to the ED for back pain.  Patient states it started yesterday.  Patient complaining of lower bilateral back pain that is constant, she rates a 10/10.  No medications prior to arrival.  Denies anything making it worse.  Patient denies any trauma or falls.  Patient states she did not get dialysis today.  Patient admits to paresthesia bilateral feet.  Patient denies fever, sweats, chills, chest pain, shortness breath, abdominal pain, nausea, vomiting, diarrhea, constipation, bowel incontinence, numbness, saddle anesthesia, headaches, dizziness, and lightheadedness.  Patient does not produce urine.      Review of patient's allergies indicates:   Allergen Reactions    Droperidol Hives    Penicillins Hives    Droperidol (bulk) Anxiety     STATES "FREAKS OUT".  TOLERATES HALDOL PRIOR TO ARRIVAL AT ANOTHER FACILITY TODAY.      Past Medical History:   Diagnosis Date    ESRD on hemodialysis 2022    Gastritis 2022    EGD was 22    Gastroparesis 2022    has not had Emptying study    Heart failure with preserved ejection fraction 2022    EF 55% on 3/22    History of supraventricular tachycardia     Hyperkalemia 2022    Hypertensive emergency 2022    Sickle cell trait 2022    Type 2 diabetes mellitus      Past Surgical History:   Procedure Laterality Date     SECTION      x 3    COLONOSCOPY      COLONOSCOPY N/A 2022    Procedure: COLONOSCOPY;  Surgeon: Jagdeep Cedeno MD;  Location: [facility];  Service: Endoscopy;  Laterality: N/A;    DESTRUCTION, CILIARY BODY, USING LASER Left 2024    Procedure: Cyclophotocoagulation G-probe, retrobulbar chlorpromazine left eye;  Surgeon: Fidel Wise " MD RANI;  [address]  Service: Ophthalmology;  Laterality: Left;    ENDOSCOPIC ULTRASOUND OF UPPER GASTROINTESTINAL TRACT N/A 12/16/2023    Procedure: ULTRASOUND, UPPER GI TRACT, ENDOSCOPIC;  Surgeon: Micky Paredes III, MD;  Location: [facility];  Service: Endoscopy;  Laterality: N/A;    ESOPHAGOGASTRODUODENOSCOPY N/A 10/18/2019    Procedure: ESOPHAGOGASTRODUODENOSCOPY (EGD);  Surgeon: Gianluca Mendez MD;  Location: [facility];  Service: Endoscopy;  Laterality: N/A;    ESOPHAGOGASTRODUODENOSCOPY N/A 08/24/2022    Procedure: EGD (ESOPHAGOGASTRODUODENOSCOPY);  Surgeon: Micky Paredes III, MD;  Location: [facility];  Service: Endoscopy;  Laterality: N/A;    ESOPHAGOGASTRODUODENOSCOPY N/A 12/05/2022    Procedure: EGD (ESOPHAGOGASTRODUODENOSCOPY);  Surgeon: Marcelo Zhong MD;  Location: [facility];  Service: Endoscopy;  Laterality: N/A;    ESOPHAGOGASTRODUODENOSCOPY N/A 9/13/2024    Procedure: EGD (ESOPHAGOGASTRODUODENOSCOPY);  Surgeon: Marcelo Zhong MD;  Location: [facility];  Service: Endoscopy;  Laterality: N/A;    EYE SURGERY      INSERTION, CATHETER, TUNNELED N/A 06/17/2023    Procedure: Insertion,catheter,tunneled;  Surgeon: Carlos Thurman Jr., MD;  Location: [facility];  Service: General;  Laterality: N/A;    LAPAROSCOPIC CHOLECYSTECTOMY N/A 07/30/2022    Procedure: CHOLECYSTECTOMY, LAPAROSCOPIC;  Surgeon: Grey Perez MD;  Location: [facility];  Service: General;  Laterality: N/A;    PLACEMENT OF DUAL-LUMEN VASCULAR CATHETER Left 07/12/2022    Procedure: INSERTION-CATHETER-JOSEPH;  Surgeon: Dionte Gan MD;  Location: [facility];  Service: General;  Laterality: Left;    PLACEMENT OF DUAL-LUMEN VASCULAR CATHETER Right 07/26/2022    Procedure: INSERTION-CATHETER-Hemosplit;  Surgeon: Dionte Gan MD;  Location: [facility];  Service: General;  Laterality: Right;     Family History   Problem Relation Name Age of Onset    Diabetes Mother      Diabetes Father       Social History      Tobacco Use    Smoking status: Never    Smokeless tobacco: Never   Substance Use Topics    Alcohol use: Not Currently    Drug use: No     Review of Systems   Constitutional:  Negative for chills, diaphoresis and fever.   Respiratory:  Negative for shortness of breath.    Cardiovascular:  Negative for chest pain.   Gastrointestinal:  Negative for abdominal pain, constipation, diarrhea, nausea and vomiting.        (-) bowel incontinence   Genitourinary:         Does not produce urine due to end-stage renal disease   Musculoskeletal:  Positive for back pain.   Skin:  Negative for rash.   Neurological:  Negative for dizziness, weakness, light-headedness, numbness and headaches.        (-) saddle anesthesia  (+) paresthesia bilateral feet       Physical Exam     Initial Vitals   BP Pulse Resp Temp SpO2   09/17/24 1215 09/17/24 1215 09/17/24 1215 09/17/24 1354 09/17/24 1215   (!) 179/98 98 (!) 22 98.1 °F (36.7 °C) 97 %      MAP       --                Physical Exam    Nursing note and vitals reviewed.  Constitutional: She appears well-developed and well-nourished. She is not diaphoretic. She is active. She does not appear ill. No distress.   HENT:   Head: Normocephalic and atraumatic.   Right Ear: External ear normal.   Left Ear: External ear normal.   Nose: Nose normal.   Eyes: Conjunctivae and EOM are normal. Pupils are equal, round, and reactive to light. Right eye exhibits no discharge. Left eye exhibits no discharge.   Neck: Phonation normal. Neck supple.   Normal range of motion.   Full passive range of motion without pain.     Cardiovascular:  Normal rate and regular rhythm.           Pulmonary/Chest: Effort normal and breath sounds normal. No respiratory distress.   Abdominal: Abdomen is soft. She exhibits no distension. There is no abdominal tenderness. There is no rebound and no guarding.   Musculoskeletal:         General: Normal range of motion.      Cervical back: Normal, full passive range of motion  without pain, normal range of motion and neck supple.      Thoracic back: Normal.      Lumbar back: Tenderness (Bilateral) present. No swelling, deformity or bony tenderness.     Neurological: She is alert and oriented to person, place, and time. She has normal strength. No sensory deficit. GCS eye subscore is 4. GCS verbal subscore is 5. GCS motor subscore is 6.   Skin: Skin is dry. Capillary refill takes less than 2 seconds.         ED Course   Procedures  Labs Reviewed   CBC W/ AUTO DIFFERENTIAL - Abnormal       Result Value    WBC 7.52      RBC 3.45 (*)     Hemoglobin 9.9 (*)     Hematocrit 29.1 (*)     MCV 84      MCH 28.7      MCHC 34.0      RDW 18.7 (*)     Platelets 176      MPV 9.6      Immature Granulocytes 0.4      Gran # (ANC) 5.8      Immature Grans (Abs) 0.03      Lymph # 0.9 (*)     Mono # 0.6      Eos # 0.1      Baso # 0.05      nRBC 0      Gran % 77.6 (*)     Lymph % 12.0 (*)     Mono % 8.5      Eosinophil % 0.8      Basophil % 0.7      Platelet Estimate Appears normal      Differential Method Automated      Narrative:     Collection has been rescheduled by [facility] at 09/17/2024 13:18 Reason:   Nurse Draw   COMPREHENSIVE METABOLIC PANEL - Abnormal    Sodium 133 (*)     Potassium 4.4      Chloride 92 (*)     CO2 19 (*)     Glucose 350 (*)     BUN 47 (*)     Creatinine 10.5 (*)     Calcium 9.6      Total Protein 7.7      Albumin 3.8      Total Bilirubin 1.0      Alkaline Phosphatase 99      AST 15      ALT 11      eGFR 4 (*)     Anion Gap 22 (*)     Narrative:     Collection has been rescheduled by [facility] at 09/17/2024 13:18 Reason:   Nurse Draw   BETA - HYDROXYBUTYRATE, SERUM - Abnormal    Beta-Hydroxybutyrate 3.0 (*)     Narrative:     Collection has been rescheduled by [facility] at 09/17/2024 13:18 Reason:   Nurse Draw   POCT GLUCOSE - Abnormal    POCT Glucose 302 (*)    ISTAT PROCEDURE - Abnormal    POC PH 7.487 (*)     POC PCO2 31.9 (*)     POC PO2 26 (*)     POC HCO3 24.1      POC BE 1      POC SATURATED  O2 53      POC TCO2 25      Sample VENOUS      Site Other      Allens Test N/A      DelSys Room Air      Mode SPONT      Sp02 100     POCT GLUCOSE MONITORING CONTINUOUS          Imaging Results              X-Ray Chest AP Portable (Final result)  Result time 09/17/24 15:07:13      Final result by Gianluca Arriaga Jr., MD (09/17/24 15:07:13)                   Impression:      No acute abnormality.      Electronically signed by: Gianluca Arriaga MD  Date:    09/17/2024  Time:    15:07               Narrative:    EXAMINATION:  XR CHEST AP PORTABLE    CLINICAL HISTORY:  hyperglycemia;    TECHNIQUE:  Single frontal view of the chest was performed.    COMPARISON:  Chest x-ray of September 14, 2024    FINDINGS:  The lungs are clear, with normal appearance of pulmonary vasculature and no pleural effusion or pneumothorax.    The cardiac silhouette is normal in size. The hilar and mediastinal contours are unremarkable.    Bones are intact.                                       Medications   morphine injection 4 mg (4 mg Intravenous Given 9/17/24 1324)   promethazine (PHENERGAN) 25 mg in 0.9% NaCl 50 mL IVPB (0 mg Intravenous Stopped 9/17/24 1443)     Medical Decision Making  34-year-old female with a past medical history of hypertension, diabetes, CHF, end-stage renal disease on dialysis Tuesday, Thursdays, and Saturdays presents to the ED for back pain.   Patient's chart and medical history reviewed.    Ddx:  Lumbar strain  Lumbar fracture  Herniated disc  Cauda Equina  Spinal Abscess  DKA  Hyperglycemia  Drug seeking    Patient's vitals reviewed.  Afebrile, no respiratory distress, and nontoxic-appearing in the ED. patient had bilateral lumbar tenderness palpation.  No midline tenderness.  Normal sensation and strength in bilateral lower extremities.  No abdominal tenderness.  Point of care glucose is 302.  Discussed this case with Dr. Morales; due to patient's history and missing dialysis today we will get basic blood  work today.  Patient given morphine for pain.  EKG showed sinus tachycardia rhythm with 101 bpm. RI interval 174 ms. QRS 88 ms.  ms. No stemi noted. Signed by Dr. Morales. Chest x-ray was unremarkable per my personal interpretation. Official chest x-ray interpretation showed no acute abnormality. CBC showed anemia of 3.45 with a H&H of 9.9 and 29.1 respectively, no need for transfusion at this time.  Per chart review this is baseline. No leukocytosis.  Beta hydroxybutyrate is elevated to 3.0.  VBG showed mild respiratory alkalosis; with normal bicarb - unlikely DKA.  CMP showed elevated BUN and creatinine 47 and 7.5 respectively with a GFR 4.  Patient is end-stage renal failure on dialysis.  Patient did miss dialysis today.  Mild hyponatremia of 133.  Glucose of 350.  Anion gap of 22.  Discussed this case with Dr. Morales.  Patient is not in DKA today.  This is most likely acute on chronic back pain with drug-seeking behavior.  Discussed results with patient, she verbalized understanding.  Patient states she is feeling better.  Discussed with patient the importance of being compliant with her insulin and dialysis, she verbalized understanding. I have reviewed the  for this patient. Patient will follow-up with the PCP. Considered but unlikely cauda equina syndrome due to AURELIA, no saddle antesthesia, bowel incontinence, urinary retention, or numbness.  Considered but unlikely a spinal abscess due to no history of IVDU, fevers, fluctuance, neuro deficits, or weakness. Patient agrees with this plan. Discussed with her strict return precautions, she verbalized understanding. Patient is stable for discharge.     Amount and/or Complexity of Data Reviewed  External Data Reviewed: labs and notes.  Labs: ordered.  Radiology: ordered.  ECG/medicine tests: ordered.    Risk  Prescription drug management.                                      Clinical Impression:  Final diagnoses:  [R73.9] Hyperglycemia  [M54.50, G89.29]  Chronic bilateral low back pain, unspecified whether sciatica present (Primary)  [N18.6, Z99.2] ESRD (end stage renal disease) on dialysis  [D64.9] Anemia, unspecified type  [Z76.5] Drug-seeking behavior  [Z91.199] Non-compliant patient          ED Disposition Condition    Discharge Stable          ED Prescriptions    None       Follow-up Information       Follow up With Specialties Details Why Contact Info    Melony Kim NP Family Medicine Call   [address] [place] [id number]  [phone]               Holdsworth, Alayna, PA-C  09/17/24 1533

## 2024-09-18 ENCOUNTER — HOSPITAL ENCOUNTER (EMERGENCY)
Facility: HOSPITAL | Age: 34
Discharge: HOME | End: 2024-09-18
Attending: EMERGENCY MEDICINE
Payer: MEDICAID

## 2024-09-18 VITALS
DIASTOLIC BLOOD PRESSURE: 75 MMHG | WEIGHT: 116 LBS | OXYGEN SATURATION: 98 % | TEMPERATURE: 98 F | HEIGHT: 62 IN | SYSTOLIC BLOOD PRESSURE: 122 MMHG | RESPIRATION RATE: 15 BRPM | HEART RATE: 94 BPM | BODY MASS INDEX: 21.35 KG/M2

## 2024-09-18 DIAGNOSIS — G89.29 OTHER CHRONIC PAIN: Primary | ICD-10-CM

## 2024-09-18 DIAGNOSIS — R73.9 HYPERGLYCEMIA: ICD-10-CM

## 2024-09-18 DIAGNOSIS — N18.9 CHRONIC RENAL FAILURE, UNSPECIFIED CKD STAGE: ICD-10-CM

## 2024-09-18 LAB
ALBUMIN SERPL BCP-MCNC: 3.5 G/DL (ref 3.5–5.2)
ALP SERPL-CCNC: 99 U/L (ref 55–135)
ALT SERPL W/O P-5'-P-CCNC: 8 U/L (ref 10–44)
ANION GAP SERPL CALC-SCNC: 21 MMOL/L (ref 8–16)
AST SERPL-CCNC: 13 U/L (ref 10–40)
BASOPHILS # BLD AUTO: 0.02 K/UL (ref 0–0.2)
BASOPHILS NFR BLD: 0.3 % (ref 0–1.9)
BILIRUB SERPL-MCNC: 1.3 MG/DL (ref 0.1–1)
BUN SERPL-MCNC: 23 MG/DL (ref 6–20)
CALCIUM SERPL-MCNC: 9.2 MG/DL (ref 8.7–10.5)
CHLORIDE SERPL-SCNC: 96 MMOL/L (ref 95–110)
CO2 SERPL-SCNC: 24 MMOL/L (ref 23–29)
CREAT SERPL-MCNC: 5.6 MG/DL (ref 0.5–1.4)
DIFFERENTIAL METHOD BLD: ABNORMAL
EOSINOPHIL # BLD AUTO: 0 K/UL (ref 0–0.5)
EOSINOPHIL NFR BLD: 0.3 % (ref 0–8)
ERYTHROCYTE [DISTWIDTH] IN BLOOD BY AUTOMATED COUNT: 18.7 % (ref 11.5–14.5)
EST. GFR  (NO RACE VARIABLE): 9.5 ML/MIN/1.73 M^2
GLUCOSE SERPL-MCNC: 423 MG/DL (ref 70–110)
HCT VFR BLD AUTO: 29.1 % (ref 37–48.5)
HGB BLD-MCNC: 10 G/DL (ref 12–16)
IMM GRANULOCYTES # BLD AUTO: 0.02 K/UL (ref 0–0.04)
IMM GRANULOCYTES NFR BLD AUTO: 0.3 % (ref 0–0.5)
LYMPHOCYTES # BLD AUTO: 0.7 K/UL (ref 1–4.8)
LYMPHOCYTES NFR BLD: 10.4 % (ref 18–48)
MCH RBC QN AUTO: 28.6 PG (ref 27–31)
MCHC RBC AUTO-ENTMCNC: 34.4 G/DL (ref 32–36)
MCV RBC AUTO: 83 FL (ref 82–98)
MONOCYTES # BLD AUTO: 0.7 K/UL (ref 0.3–1)
MONOCYTES NFR BLD: 10.5 % (ref 4–15)
NEUTROPHILS # BLD AUTO: 5.3 K/UL (ref 1.8–7.7)
NEUTROPHILS NFR BLD: 78.2 % (ref 38–73)
NRBC BLD-RTO: 0 /100 WBC
PLATELET # BLD AUTO: 158 K/UL (ref 150–450)
PMV BLD AUTO: 10.1 FL (ref 9.2–12.9)
POCT GLUCOSE: 363 MG/DL (ref 70–110)
POCT GLUCOSE: 387 MG/DL (ref 70–110)
POTASSIUM SERPL-SCNC: 3.8 MMOL/L (ref 3.5–5.1)
PROT SERPL-MCNC: 7.3 G/DL (ref 6–8.4)
RBC # BLD AUTO: 3.5 M/UL (ref 4–5.4)
SODIUM SERPL-SCNC: 141 MMOL/L (ref 136–145)
WBC # BLD AUTO: 6.83 K/UL (ref 3.9–12.7)

## 2024-09-18 PROCEDURE — 96372 THER/PROPH/DIAG INJ SC/IM: CPT | Performed by: EMERGENCY MEDICINE

## 2024-09-18 PROCEDURE — 99284 EMERGENCY DEPT VISIT MOD MDM: CPT | Mod: 25

## 2024-09-18 PROCEDURE — 85025 COMPLETE CBC W/AUTO DIFF WBC: CPT | Performed by: EMERGENCY MEDICINE

## 2024-09-18 PROCEDURE — 80053 COMPREHEN METABOLIC PANEL: CPT | Performed by: EMERGENCY MEDICINE

## 2024-09-18 PROCEDURE — 63600175 PHARM REV CODE 636 W HCPCS: Performed by: EMERGENCY MEDICINE

## 2024-09-18 PROCEDURE — 82962 GLUCOSE BLOOD TEST: CPT

## 2024-09-18 RX ADMIN — HUMAN INSULIN 7 UNITS: 100 INJECTION, SOLUTION SUBCUTANEOUS at 10:09

## 2024-09-19 ENCOUNTER — HOSPITAL ENCOUNTER (INPATIENT)
Facility: HOSPITAL | Age: 34
LOS: 11 days | Discharge: HOME HEALTH | DRG: 291 | End: 2024-09-30
Attending: EMERGENCY MEDICINE | Admitting: INTERNAL MEDICINE
Payer: MEDICAID

## 2024-09-19 DIAGNOSIS — Z99.2 ESRD (END STAGE RENAL DISEASE) ON DIALYSIS: ICD-10-CM

## 2024-09-19 DIAGNOSIS — R10.9 LEFT FLANK PAIN: ICD-10-CM

## 2024-09-19 DIAGNOSIS — R73.9 HYPERGLYCEMIA: ICD-10-CM

## 2024-09-19 DIAGNOSIS — R00.0 TACHYCARDIA: ICD-10-CM

## 2024-09-19 DIAGNOSIS — Z99.2 CKD (CHRONIC KIDNEY DISEASE) REQUIRING CHRONIC DIALYSIS: Primary | ICD-10-CM

## 2024-09-19 DIAGNOSIS — G89.29 CHRONIC ABDOMINAL PAIN: ICD-10-CM

## 2024-09-19 DIAGNOSIS — E11.65 TYPE 2 DIABETES MELLITUS WITH HYPERGLYCEMIA, WITH LONG-TERM CURRENT USE OF INSULIN: ICD-10-CM

## 2024-09-19 DIAGNOSIS — R94.31 QT PROLONGATION: ICD-10-CM

## 2024-09-19 DIAGNOSIS — Z91.158 DIALYSIS PATIENT, NONCOMPLIANT: ICD-10-CM

## 2024-09-19 DIAGNOSIS — R11.2 NAUSEA AND VOMITING, UNSPECIFIED VOMITING TYPE: ICD-10-CM

## 2024-09-19 DIAGNOSIS — E87.29 HIGH ANION GAP METABOLIC ACIDOSIS: ICD-10-CM

## 2024-09-19 DIAGNOSIS — N18.6 ESRD (END STAGE RENAL DISEASE) ON DIALYSIS: ICD-10-CM

## 2024-09-19 DIAGNOSIS — Z79.4 TYPE 2 DIABETES MELLITUS WITH HYPERGLYCEMIA, WITH LONG-TERM CURRENT USE OF INSULIN: ICD-10-CM

## 2024-09-19 DIAGNOSIS — R07.9 CHEST PAIN: ICD-10-CM

## 2024-09-19 DIAGNOSIS — R10.9 CHRONIC ABDOMINAL PAIN: ICD-10-CM

## 2024-09-19 DIAGNOSIS — N18.6 CKD (CHRONIC KIDNEY DISEASE) REQUIRING CHRONIC DIALYSIS: Primary | ICD-10-CM

## 2024-09-19 LAB
ALBUMIN SERPL BCP-MCNC: 4 G/DL (ref 3.5–5.2)
ALLENS TEST: ABNORMAL
ALP SERPL-CCNC: 102 U/L (ref 55–135)
ALT SERPL W/O P-5'-P-CCNC: 10 U/L (ref 10–44)
ANION GAP SERPL CALC-SCNC: 25 MMOL/L (ref 8–16)
AST SERPL-CCNC: 16 U/L (ref 10–40)
B-OH-BUTYR BLD STRIP-SCNC: 3.4 MMOL/L (ref 0–0.5)
BASOPHILS # BLD AUTO: 0.03 K/UL (ref 0–0.2)
BASOPHILS NFR BLD: 0.4 % (ref 0–1.9)
BILIRUB SERPL-MCNC: 1.9 MG/DL (ref 0.1–1)
BUN SERPL-MCNC: 26 MG/DL (ref 6–20)
CALCIUM SERPL-MCNC: 10 MG/DL (ref 8.7–10.5)
CHLORIDE SERPL-SCNC: 96 MMOL/L (ref 95–110)
CO2 SERPL-SCNC: 20 MMOL/L (ref 23–29)
CREAT SERPL-MCNC: 6.6 MG/DL (ref 0.5–1.4)
DELSYS: ABNORMAL
DIFFERENTIAL METHOD BLD: ABNORMAL
EOSINOPHIL # BLD AUTO: 0 K/UL (ref 0–0.5)
EOSINOPHIL NFR BLD: 0.4 % (ref 0–8)
ERYTHROCYTE [DISTWIDTH] IN BLOOD BY AUTOMATED COUNT: 19 % (ref 11.5–14.5)
EST. GFR  (NO RACE VARIABLE): 8 ML/MIN/1.73 M^2
FLOW: 2
GLUCOSE SERPL-MCNC: 196 MG/DL (ref 70–110)
HCO3 UR-SCNC: 27.9 MMOL/L (ref 24–28)
HCT VFR BLD AUTO: 32.5 % (ref 37–48.5)
HGB BLD-MCNC: 11 G/DL (ref 12–16)
IMM GRANULOCYTES # BLD AUTO: 0.02 K/UL (ref 0–0.04)
IMM GRANULOCYTES NFR BLD AUTO: 0.2 % (ref 0–0.5)
LYMPHOCYTES # BLD AUTO: 1.5 K/UL (ref 1–4.8)
LYMPHOCYTES NFR BLD: 17.1 % (ref 18–48)
MAGNESIUM SERPL-MCNC: 1.9 MG/DL (ref 1.6–2.6)
MCH RBC QN AUTO: 29.1 PG (ref 27–31)
MCHC RBC AUTO-ENTMCNC: 33.8 G/DL (ref 32–36)
MCV RBC AUTO: 86 FL (ref 82–98)
MODE: ABNORMAL
MONOCYTES # BLD AUTO: 0.9 K/UL (ref 0.3–1)
MONOCYTES NFR BLD: 10.8 % (ref 4–15)
NEUTROPHILS # BLD AUTO: 6 K/UL (ref 1.8–7.7)
NEUTROPHILS NFR BLD: 71.1 % (ref 38–73)
NRBC BLD-RTO: 0 /100 WBC
PCO2 BLDA: 48.8 MMHG (ref 35–45)
PH SMN: 7.37 [PH] (ref 7.35–7.45)
PHOSPHATE SERPL-MCNC: 2.6 MG/DL (ref 2.7–4.5)
PLATELET # BLD AUTO: 197 K/UL (ref 150–450)
PMV BLD AUTO: 9.7 FL (ref 9.2–12.9)
PO2 BLDA: 35 MMHG (ref 40–60)
POC BE: 3 MMOL/L
POC SATURATED O2: 64 % (ref 95–100)
POC TCO2: 29 MMOL/L (ref 24–29)
POCT GLUCOSE: 198 MG/DL (ref 70–110)
POCT GLUCOSE: 304 MG/DL (ref 70–110)
POTASSIUM SERPL-SCNC: 4.1 MMOL/L (ref 3.5–5.1)
PROT SERPL-MCNC: 8.2 G/DL (ref 6–8.4)
RBC # BLD AUTO: 3.78 M/UL (ref 4–5.4)
SAMPLE: ABNORMAL
SITE: ABNORMAL
SODIUM SERPL-SCNC: 141 MMOL/L (ref 136–145)
SP02: 100
WBC # BLD AUTO: 8.46 K/UL (ref 3.9–12.7)

## 2024-09-19 PROCEDURE — 83735 ASSAY OF MAGNESIUM: CPT | Performed by: NURSE PRACTITIONER

## 2024-09-19 PROCEDURE — 80053 COMPREHEN METABOLIC PANEL: CPT | Performed by: NURSE PRACTITIONER

## 2024-09-19 PROCEDURE — 82803 BLOOD GASES ANY COMBINATION: CPT

## 2024-09-19 PROCEDURE — 93005 ELECTROCARDIOGRAM TRACING: CPT

## 2024-09-19 PROCEDURE — 84100 ASSAY OF PHOSPHORUS: CPT | Performed by: NURSE PRACTITIONER

## 2024-09-19 PROCEDURE — 5A1D70Z PERFORMANCE OF URINARY FILTRATION, INTERMITTENT, LESS THAN 6 HOURS PER DAY: ICD-10-PCS | Performed by: INTERNAL MEDICINE

## 2024-09-19 PROCEDURE — 82010 KETONE BODYS QUAN: CPT

## 2024-09-19 PROCEDURE — 96372 THER/PROPH/DIAG INJ SC/IM: CPT

## 2024-09-19 PROCEDURE — 25000003 PHARM REV CODE 250

## 2024-09-19 PROCEDURE — 63600175 PHARM REV CODE 636 W HCPCS

## 2024-09-19 PROCEDURE — G0378 HOSPITAL OBSERVATION PER HR: HCPCS

## 2024-09-19 PROCEDURE — 11000001 HC ACUTE MED/SURG PRIVATE ROOM

## 2024-09-19 PROCEDURE — 82962 GLUCOSE BLOOD TEST: CPT

## 2024-09-19 PROCEDURE — 99285 EMERGENCY DEPT VISIT HI MDM: CPT | Mod: 25

## 2024-09-19 PROCEDURE — 93010 ELECTROCARDIOGRAM REPORT: CPT | Mod: ,,, | Performed by: INTERNAL MEDICINE

## 2024-09-19 PROCEDURE — 90935 HEMODIALYSIS ONE EVALUATION: CPT

## 2024-09-19 PROCEDURE — 99900035 HC TECH TIME PER 15 MIN (STAT)

## 2024-09-19 PROCEDURE — 96365 THER/PROPH/DIAG IV INF INIT: CPT

## 2024-09-19 PROCEDURE — 85025 COMPLETE CBC W/AUTO DIFF WBC: CPT | Performed by: NURSE PRACTITIONER

## 2024-09-19 PROCEDURE — 96375 TX/PRO/DX INJ NEW DRUG ADDON: CPT

## 2024-09-19 PROCEDURE — 36415 COLL VENOUS BLD VENIPUNCTURE: CPT | Performed by: NURSE PRACTITIONER

## 2024-09-19 RX ORDER — SIMETHICONE 80 MG
1 TABLET,CHEWABLE ORAL 4 TIMES DAILY PRN
Status: DISCONTINUED | OUTPATIENT
Start: 2024-09-19 | End: 2024-09-30 | Stop reason: HOSPADM

## 2024-09-19 RX ORDER — INSULIN ASPART 100 [IU]/ML
0-5 INJECTION, SOLUTION INTRAVENOUS; SUBCUTANEOUS
Status: DISCONTINUED | OUTPATIENT
Start: 2024-09-19 | End: 2024-09-22

## 2024-09-19 RX ORDER — SODIUM CHLORIDE 9 MG/ML
INJECTION, SOLUTION INTRAVENOUS
Status: CANCELLED | OUTPATIENT
Start: 2024-09-19

## 2024-09-19 RX ORDER — ACETAMINOPHEN 325 MG/1
650 TABLET ORAL EVERY 4 HOURS PRN
Status: DISCONTINUED | OUTPATIENT
Start: 2024-09-19 | End: 2024-09-30 | Stop reason: HOSPADM

## 2024-09-19 RX ORDER — GLUCAGON 1 MG
1 KIT INJECTION
Status: DISCONTINUED | OUTPATIENT
Start: 2024-09-19 | End: 2024-09-22

## 2024-09-19 RX ORDER — LORAZEPAM 2 MG/ML
1 INJECTION INTRAMUSCULAR ONCE
Status: COMPLETED | OUTPATIENT
Start: 2024-09-19 | End: 2024-09-19

## 2024-09-19 RX ORDER — PANTOPRAZOLE SODIUM 40 MG/1
40 TABLET, DELAYED RELEASE ORAL DAILY
Status: DISCONTINUED | OUTPATIENT
Start: 2024-09-19 | End: 2024-09-20

## 2024-09-19 RX ORDER — HYDROMORPHONE HYDROCHLORIDE 1 MG/ML
1 INJECTION, SOLUTION INTRAMUSCULAR; INTRAVENOUS; SUBCUTANEOUS
Status: COMPLETED | OUTPATIENT
Start: 2024-09-19 | End: 2024-09-19

## 2024-09-19 RX ORDER — LEVETIRACETAM 250 MG/1
500 TABLET ORAL 2 TIMES DAILY
Status: DISCONTINUED | OUTPATIENT
Start: 2024-09-19 | End: 2024-09-30 | Stop reason: HOSPADM

## 2024-09-19 RX ORDER — SODIUM CHLORIDE 9 MG/ML
INJECTION, SOLUTION INTRAVENOUS ONCE
Status: CANCELLED | OUTPATIENT
Start: 2024-09-19 | End: 2024-09-19

## 2024-09-19 RX ORDER — ALUMINUM HYDROXIDE, MAGNESIUM HYDROXIDE, AND SIMETHICONE 1200; 120; 1200 MG/30ML; MG/30ML; MG/30ML
30 SUSPENSION ORAL 4 TIMES DAILY PRN
Status: DISCONTINUED | OUTPATIENT
Start: 2024-09-19 | End: 2024-09-30 | Stop reason: HOSPADM

## 2024-09-19 RX ORDER — PREDNISOLONE ACETATE 10 MG/ML
1 SUSPENSION/ DROPS OPHTHALMIC 2 TIMES DAILY
Status: DISCONTINUED | OUTPATIENT
Start: 2024-09-19 | End: 2024-09-30 | Stop reason: HOSPADM

## 2024-09-19 RX ORDER — PANTOPRAZOLE SODIUM 40 MG/1
40 TABLET, DELAYED RELEASE ORAL DAILY
Status: DISCONTINUED | OUTPATIENT
Start: 2024-09-20 | End: 2024-09-30 | Stop reason: HOSPADM

## 2024-09-19 RX ORDER — MORPHINE SULFATE 4 MG/ML
4 INJECTION, SOLUTION INTRAMUSCULAR; INTRAVENOUS
Status: COMPLETED | OUTPATIENT
Start: 2024-09-19 | End: 2024-09-19

## 2024-09-19 RX ORDER — IBUPROFEN 200 MG
24 TABLET ORAL
Status: DISCONTINUED | OUTPATIENT
Start: 2024-09-19 | End: 2024-09-22

## 2024-09-19 RX ORDER — IBUPROFEN 200 MG
24 TABLET ORAL
Status: DISCONTINUED | OUTPATIENT
Start: 2024-09-19 | End: 2024-09-19

## 2024-09-19 RX ORDER — FLUOXETINE HYDROCHLORIDE 20 MG/1
40 CAPSULE ORAL DAILY
Status: DISCONTINUED | OUTPATIENT
Start: 2024-09-20 | End: 2024-09-30 | Stop reason: HOSPADM

## 2024-09-19 RX ORDER — IBUPROFEN 200 MG
16 TABLET ORAL
Status: DISCONTINUED | OUTPATIENT
Start: 2024-09-19 | End: 2024-09-22

## 2024-09-19 RX ORDER — HEPARIN SODIUM 5000 [USP'U]/ML
5000 INJECTION, SOLUTION INTRAVENOUS; SUBCUTANEOUS
Status: CANCELLED | OUTPATIENT
Start: 2024-09-19

## 2024-09-19 RX ORDER — LORAZEPAM 2 MG/ML
1 INJECTION INTRAMUSCULAR EVERY 6 HOURS PRN
Status: DISCONTINUED | OUTPATIENT
Start: 2024-09-19 | End: 2024-09-30 | Stop reason: HOSPADM

## 2024-09-19 RX ORDER — IBUPROFEN 200 MG
16 TABLET ORAL
Status: DISCONTINUED | OUTPATIENT
Start: 2024-09-19 | End: 2024-09-19

## 2024-09-19 RX ORDER — SUCRALFATE 1 G/1
1 TABLET ORAL 4 TIMES DAILY
Status: DISCONTINUED | OUTPATIENT
Start: 2024-09-19 | End: 2024-09-30 | Stop reason: HOSPADM

## 2024-09-19 RX ORDER — INSULIN GLARGINE 100 [IU]/ML
20 INJECTION, SOLUTION SUBCUTANEOUS NIGHTLY
Status: DISCONTINUED | OUTPATIENT
Start: 2024-09-19 | End: 2024-09-30 | Stop reason: HOSPADM

## 2024-09-19 RX ORDER — HEPARIN SODIUM 5000 [USP'U]/ML
5000 INJECTION, SOLUTION INTRAVENOUS; SUBCUTANEOUS EVERY 8 HOURS
Status: DISCONTINUED | OUTPATIENT
Start: 2024-09-19 | End: 2024-09-19

## 2024-09-19 RX ORDER — GLUCAGON 1 MG
1 KIT INJECTION
Status: DISCONTINUED | OUTPATIENT
Start: 2024-09-19 | End: 2024-09-19

## 2024-09-19 RX ORDER — MUPIROCIN 20 MG/G
OINTMENT TOPICAL 2 TIMES DAILY
Status: CANCELLED | OUTPATIENT
Start: 2024-09-19 | End: 2024-09-24

## 2024-09-19 RX ORDER — SEVELAMER CARBONATE 800 MG/1
800 TABLET, FILM COATED ORAL
Status: DISCONTINUED | OUTPATIENT
Start: 2024-09-19 | End: 2024-09-30 | Stop reason: HOSPADM

## 2024-09-19 RX ORDER — IPRATROPIUM BROMIDE AND ALBUTEROL SULFATE 2.5; .5 MG/3ML; MG/3ML
3 SOLUTION RESPIRATORY (INHALATION) EVERY 6 HOURS PRN
Status: DISCONTINUED | OUTPATIENT
Start: 2024-09-19 | End: 2024-09-30 | Stop reason: HOSPADM

## 2024-09-19 RX ORDER — SODIUM CHLORIDE 0.9 % (FLUSH) 0.9 %
10 SYRINGE (ML) INJECTION EVERY 12 HOURS PRN
Status: DISCONTINUED | OUTPATIENT
Start: 2024-09-19 | End: 2024-09-30 | Stop reason: HOSPADM

## 2024-09-19 RX ORDER — NALOXONE HCL 0.4 MG/ML
0.02 VIAL (ML) INJECTION
Status: DISCONTINUED | OUTPATIENT
Start: 2024-09-19 | End: 2024-09-30 | Stop reason: HOSPADM

## 2024-09-19 RX ADMIN — PANTOPRAZOLE SODIUM 40 MG: 40 TABLET, DELAYED RELEASE ORAL at 06:09

## 2024-09-19 RX ADMIN — MORPHINE SULFATE 4 MG: 4 INJECTION INTRAVENOUS at 11:09

## 2024-09-19 RX ADMIN — INSULIN ASPART 4 UNITS: 100 INJECTION, SOLUTION INTRAVENOUS; SUBCUTANEOUS at 06:09

## 2024-09-19 RX ADMIN — PROMETHAZINE HYDROCHLORIDE 25 MG: 25 INJECTION INTRAMUSCULAR; INTRAVENOUS at 10:09

## 2024-09-19 RX ADMIN — LORAZEPAM 1 MG: 2 INJECTION INTRAMUSCULAR; INTRAVENOUS at 06:09

## 2024-09-19 RX ADMIN — HYDROMORPHONE HYDROCHLORIDE 1 MG: 1 INJECTION, SOLUTION INTRAMUSCULAR; INTRAVENOUS; SUBCUTANEOUS at 12:09

## 2024-09-19 NOTE — NURSING
Nurses Note -- 4 Eyes      9/19/2024   6:46 PM      Skin assessed during: Admit      [] No Altered Skin Integrity Present    []Prevention Measures Documented      [x] Yes- Altered Skin Integrity Present or Discovered   [] LDA Added if Not in Epic (Describe Wound)   [] New Altered Skin Integrity was Present on Admit and Documented in LDA   [x] Wound Image Taken    Wound Care Consulted? Yes    Attending Nurse:  Christy Woo RN/Staff Member:   SHAILA David

## 2024-09-19 NOTE — FIRST PROVIDER EVALUATION
" Emergency Department TeleTriage Encounter Note      CHIEF COMPLAINT    Chief Complaint   Patient presents with    Flank Pain     Left side flank pain, N&V starting this morning       VITAL SIGNS   Initial Vitals   BP Pulse Resp Temp SpO2   09/19/24 0956 09/19/24 0956 09/19/24 0956 09/19/24 0956 09/19/24 0957   (!) 131/97 (!) 112 (!) 22 97.9 °F (36.6 °C) 100 %      MAP       --                   ALLERGIES    Review of patient's allergies indicates:   Allergen Reactions    Droperidol Hives    Haldol [haloperidol lactate] Hives    Penicillins Hives    Droperidol (bulk) Anxiety     STATES "FREAKS OUT".  TOLERATES HALDOL PRIOR TO ARRIVAL AT ANOTHER FACILITY TODAY.        PROVIDER TRIAGE NOTE  This is a teletriage evaluation of a 34 y.o. female presenting to the ED complaining of left flank pain starting this morning. States pain is sharp in nature. Associated n/v.  Pt has not had dialysis since Monday.  Denies CP and SOB.     Pt is alert, crying.     Initial orders will be placed and care will be transferred to an alternate provider when patient is roomed for a full evaluation. Any additional orders and the final disposition will be determined by that provider.         ORDERS  Labs Reviewed - No data to display    ED Orders (720h ago, onward)      Start Ordered     Status Ordering Provider    09/19/24 1012 09/19/24 1011  CBC auto differential  STAT         Ordered IGGY SERRATO N.    09/19/24 1012 09/19/24 1011  Comprehensive metabolic panel  STAT         Ordered IGGY SERRATO N.    09/19/24 1012 09/19/24 1011  POCT glucose  Once         Ordered IGGY SERRATO N.    09/19/24 1012 09/19/24 1011  Magnesium  STAT         Ordered IGGY SERRATO N.    09/19/24 1012 09/19/24 1011  Phosphorus  STAT         Ordered IGGY SERRATO N.    09/19/24 1012 09/19/24 1011  Insert Saline lock IV  Once         Ordered IGGY SERRATO N.              Virtual Visit Note: The provider triage " portion of this emergency department evaluation and documentation was performed via [employer], a HIPAA-compliant telemedicine application, in concert with a tele-presenter in the room. A face to face patient evaluation with one of my colleagues will occur once the patient is placed in an emergency department room.      DISCLAIMER: This note was prepared with [employer] voice recognition transcription software. Garbled syntax, mangled pronouns, and other bizarre constructions may be attributed to that software system.

## 2024-09-19 NOTE — ED NOTES
Pt reports morelia lower back pain that began a couple days ago. Pt reports she did report this compliant at dialysis and did not receive a call back from nephrologist office. Pt reports she received IV pain medicine last night at another ED and that is the only thing that gave her relief from the pain. Pt reports she does not make urine. Pt reports she went to dialysis yesterday and will be going tomorrow.

## 2024-09-19 NOTE — ED NOTES
Pt reports she is going to another hospital because she is hurting. RN did dialysis education, Med clearance and dialysis education with pt.RN also extensively explained why it is important that she calls her Primary care Doctors and specialty Doctors to take care of her complaints, concerns and cares r/t Renal and dialysis is a specialty and requires close monitoring of all medications, weights, dialysis times and needs.  Pt reported understanding. Pt ambulated to lobby to call her dad to come pick her up.

## 2024-09-19 NOTE — ASSESSMENT & PLAN NOTE
Patient's FSGs are uncontrolled due to hyperglycemia on current medication regimen.  Last A1c reviewed-   Lab Results   Component Value Date    HGBA1C 9.0 (H) 09/10/2024     Most recent fingerstick glucose reviewed-   Recent Labs   Lab 09/18/24  2101 09/18/24  2256 09/19/24  1019   POCTGLUCOSE 363* 387* 198*     Current correctional scale  Low  Decrease anti-hyperglycemic dose as follows-   Antihyperglycemics (From admission, onward)      Start     Stop Route Frequency Ordered    09/19/24 2100  insulin glargine U-100 (Lantus) pen 20 Units         -- SubQ Nightly 09/19/24 1751    09/19/24 1732  insulin aspart U-100 pen 0-5 Units         -- SubQ Before meals & nightly PRN 09/19/24 1632          Hold Oral hypoglycemics while patient is in the hospital.

## 2024-09-19 NOTE — ED PROVIDER NOTES
"Encounter Date: 2024       History     Chief Complaint   Patient presents with    Back Pain     Bilateral lower back pain.      Patient presents to the emergency department for evaluation of bilateral lower back pain.  Patient has chronic history of recurrent bilateral low back pain.  She is a dialysis patient who dialyzes on  and Saturday.  Patient states she did go to dialysis today.  She denies any nausea or vomiting.  She has had no fevers or chills.  She denies any numbness or loss of bowel or bladder control.  Patient does not make urine secondary to her chronic renal failure.  She does have a long history of drug-seeking behavior and chronic pain.  She was evaluated yesterday for the same complaint at [facility] Emergency Department with a normal workup.  She was given pain medicine at that time and instructed to follow up with her primary care provider.  Patient denies any recent injury.    The history is provided by the patient.     Review of patient's allergies indicates:   Allergen Reactions    Droperidol Hives    Penicillins Hives    Droperidol (bulk) Anxiety     STATES "FREAKS OUT".  TOLERATES HALDOL PRIOR TO ARRIVAL AT ANOTHER FACILITY TODAY.      Past Medical History:   Diagnosis Date    ESRD on hemodialysis 2022    Gastritis 2022    EGD was 22    Gastroparesis 2022    has not had Emptying study    Heart failure with preserved ejection fraction 2022    EF 55% on 3/22    History of supraventricular tachycardia     Hyperkalemia 2022    Hypertensive emergency 2022    Sickle cell trait 2022    Type 2 diabetes mellitus      Past Surgical History:   Procedure Laterality Date     SECTION      x 3    COLONOSCOPY      COLONOSCOPY N/A 2022    Procedure: COLONOSCOPY;  Surgeon: Jagdeep Cedeno MD;  Location: [facility];  Service: Endoscopy;  Laterality: N/A;    DESTRUCTION, CILIARY BODY, USING LASER Left 2024    " Procedure: Cyclophotocoagulation G-probe, retrobulbar chlorpromazine left eye;  Surgeon: Fidel Wise MD;  Location: [address];  Service: Ophthalmology;  Laterality: Left;    ENDOSCOPIC ULTRASOUND OF UPPER GASTROINTESTINAL TRACT N/A 12/16/2023    Procedure: ULTRASOUND, UPPER GI TRACT, ENDOSCOPIC;  Surgeon: Micky Paredes III, MD;  Location: [facility];  Service: Endoscopy;  Laterality: N/A;    ESOPHAGOGASTRODUODENOSCOPY N/A 10/18/2019    Procedure: ESOPHAGOGASTRODUODENOSCOPY (EGD);  Surgeon: Gianluca Mendez MD;  Location: [facility];  Service: Endoscopy;  Laterality: N/A;    ESOPHAGOGASTRODUODENOSCOPY N/A 08/24/2022    Procedure: EGD (ESOPHAGOGASTRODUODENOSCOPY);  Surgeon: Micky Paredes III, MD;  Location: [facility];  Service: Endoscopy;  Laterality: N/A;    ESOPHAGOGASTRODUODENOSCOPY N/A 12/05/2022    Procedure: EGD (ESOPHAGOGASTRODUODENOSCOPY);  Surgeon: Marcelo Zhong MD;  Location: [facility];  Service: Endoscopy;  Laterality: N/A;    ESOPHAGOGASTRODUODENOSCOPY N/A 9/13/2024    Procedure: EGD (ESOPHAGOGASTRODUODENOSCOPY);  Surgeon: Marcelo Zhong MD;  Location: [facility];  Service: Endoscopy;  Laterality: N/A;    EYE SURGERY      INSERTION, CATHETER, TUNNELED N/A 06/17/2023    Procedure: Insertion,catheter,tunneled;  Surgeon: Carlos Thurman Jr., MD;  Location: [facility];  Service: General;  Laterality: N/A;    LAPAROSCOPIC CHOLECYSTECTOMY N/A 07/30/2022    Procedure: CHOLECYSTECTOMY, LAPAROSCOPIC;  Surgeon: Grey Perez MD;  Location: [facility];  Service: General;  Laterality: N/A;    PLACEMENT OF DUAL-LUMEN VASCULAR CATHETER Left 07/12/2022    Procedure: INSERTION-CATHETER-JOSEPH;  Surgeon: Dionte Gan MD;  Location: [facility] OR;  Service: General;  Laterality: Left;    PLACEMENT OF DUAL-LUMEN VASCULAR CATHETER Right 07/26/2022    Procedure: INSERTION-CATHETER-Hemosplit;  Surgeon: Dionte Gan MD;  Location: [facility];  Service: General;  Laterality: Right;     Family History    Problem Relation Name Age of Onset    Diabetes Mother      Diabetes Father       Social History     Tobacco Use    Smoking status: Never    Smokeless tobacco: Never   Substance Use Topics    Alcohol use: Not Currently    Drug use: No     Review of Systems   Constitutional:  Negative for activity change, appetite change, chills and fever.   HENT:  Negative for congestion, ear discharge, ear pain, nosebleeds, sore throat and voice change.    Eyes:  Negative for photophobia, pain and discharge.   Respiratory:  Negative for cough, chest tightness, shortness of breath and wheezing.    Cardiovascular:  Negative for chest pain and palpitations.   Gastrointestinal:  Negative for abdominal pain, constipation, nausea and vomiting.   Genitourinary:  Negative for dysuria, flank pain, frequency and urgency.   Musculoskeletal:  Positive for back pain. Negative for neck pain.   Skin:  Negative for rash and wound.   Neurological:  Negative for dizziness, seizures, weakness and headaches.   All other systems reviewed and are negative.      Physical Exam     Initial Vitals [09/18/24 2056]   BP Pulse Resp Temp SpO2   (!) 144/82 104 20 98.2 °F (36.8 °C) 100 %      MAP       --         Physical Exam    Nursing note and vitals reviewed.  Constitutional: She appears well-developed and well-nourished.   HENT:   Head: Normocephalic and atraumatic.   Right Ear: External ear normal.   Left Ear: External ear normal.   Nose: Nose normal.   Mouth/Throat: Oropharynx is clear and moist.   Eyes: Conjunctivae and EOM are normal. Pupils are equal, round, and reactive to light.   Neck: Neck supple. No tracheal deviation present.   Normal range of motion.  Cardiovascular:  Normal rate, regular rhythm and normal heart sounds.           Pulmonary/Chest: Breath sounds normal. She has no wheezes.   Abdominal: Abdomen is soft. Bowel sounds are normal. There is no abdominal tenderness.   Musculoskeletal:         General: Normal range of motion.       Cervical back: Normal range of motion and neck supple.      Comments: Patient has a mild palpatory tenderness in lower back bilaterally.  There is no midline tenderness or deformity noted.  There is no obvious ecchymosis or evidence of injury.     Neurological: She is alert and oriented to person, place, and time. She has normal reflexes.   Skin: Skin is warm and dry. Capillary refill takes less than 2 seconds. No rash noted.         ED Course   Procedures  Labs Reviewed   CBC W/ AUTO DIFFERENTIAL - Abnormal       Result Value    WBC 6.83      RBC 3.50 (*)     Hemoglobin 10.0 (*)     Hematocrit 29.1 (*)     MCV 83      MCH 28.6      MCHC 34.4      RDW 18.7 (*)     Platelets 158      MPV 10.1      Immature Granulocytes 0.3      Gran # (ANC) 5.3      Immature Grans (Abs) 0.02      Lymph # 0.7 (*)     Mono # 0.7      Eos # 0.0      Baso # 0.02      nRBC 0      Gran % 78.2 (*)     Lymph % 10.4 (*)     Mono % 10.5      Eosinophil % 0.3      Basophil % 0.3      Differential Method Automated     COMPREHENSIVE METABOLIC PANEL - Abnormal    Sodium 141      Potassium 3.8      Chloride 96      CO2 24      Glucose 423 (*)     BUN 23 (*)     Creatinine 5.6 (*)     Calcium 9.2      Total Protein 7.3      Albumin 3.5      Total Bilirubin 1.3 (*)     Alkaline Phosphatase 99      AST 13      ALT 8 (*)     eGFR 9.5 (*)     Anion Gap 21 (*)    POCT GLUCOSE - Abnormal    POCT Glucose 363 (*)           Imaging Results    None          Medications   insulin regular injection 7 Units 0.07 mL (has no administration in time range)     Medical Decision Making  History is obtained from the patient.  All labs were reviewed by myself.  Differential diagnosis includes, but is not limited to, chronic pain/drug-seeking behavior/lumbar myositis/lumbar strain/lumbar disc disease  Patient has chronic renal failure as well as diabetes, chronic pain and drug-seeking behavior, all of which are pertinent to her visit today.    I reviewed the patient's  chart from yesterday at [facility].  Patient did have a complete workup which showed no acute process.  She also had consultation at that time with Nephrology who recommended no further treatment at that time.    Patient has moderately elevated glucose which will be treated with insulin in the emergency department.  She will be discharged to follow up with her primary care provider regarding her chronic recurrent pain.    Amount and/or Complexity of Data Reviewed  Labs: ordered.    Risk  OTC drugs.                                      Clinical Impression:  Final diagnoses:  [G89.29] Other chronic pain (Primary)  [R73.9] Hyperglycemia  [N18.9] Chronic renal failure, unspecified CKD stage          ED Disposition Condition    Discharge Stable          ED Prescriptions    None       Follow-up Information       Follow up With Specialties Details Why Contact Info    Melony Kim, NP Family Medicine Schedule an appointment as soon as possible for a visit   [facility] [address]  [facility] [address]  [phone]               Osbaldo Vargas, DO  09/18/24 6457

## 2024-09-19 NOTE — ASSESSMENT & PLAN NOTE
Creatine stable for now. BMP reviewed- noted Estimated Creatinine Clearance: 9.4 mL/min (A) (based on SCr of 6.6 mg/dL (H)). according to latest data. Based on current GFR, CKD stage is end stage.  Monitor UOP and serial BMP and adjust therapy as needed. Renally dose meds. Avoid nephrotoxic medications and procedures.    Last full dialysis session on Monday 9/16/24. Patient normally Tue/Thur/Sat. Missed appointment today due to abdominal pain  Dr. Figueroa placed orders for dialysis upon admit  Daily cbc, cmp, mag, phos

## 2024-09-19 NOTE — DISCHARGE INSTRUCTIONS
Follow up with your primary care provider who is the person who should deal with your chronic pain situation.

## 2024-09-19 NOTE — ED PROVIDER NOTES
"Encounter Date: 2024       History     Chief Complaint   Patient presents with    Flank Pain     Left side flank pain, N&V starting this morning     34-year-old female with a past medical history of hypertension, diabetes, CHF, end-stage renal disease on dialysis Tuesday, , and  presents to the ED for left flank pain.  Patient states it started this morning.  Patient complaining of a sharp pain that is constant, she rates a 10/10.  No medications prior to arrival.  Denies anything making it worse.  Patient denies any trauma or falls.  Patient states she got dialysis last on Monday ().  Patient admits to paresthesia bilateral feet that is chronic, nausea, vomiting x3, and constipation with last BM yesterday.  Patient denies fever, sweats, chills, chest pain, shortness breath, abdominal pain, diarrhea, bowel incontinence, numbness, saddle anesthesia, headaches, dizziness, and lightheadedness.  Patient does not produce urine.           Review of patient's allergies indicates:   Allergen Reactions    Droperidol Hives    Haldol [haloperidol lactate] Hives    Penicillins Hives    Droperidol (bulk) Anxiety     STATES "FREAKS OUT".  TOLERATES HALDOL PRIOR TO ARRIVAL AT ANOTHER FACILITY TODAY.      Past Medical History:   Diagnosis Date    ESRD on hemodialysis 2022    Gastritis 2022    EGD was 22    Gastroparesis 2022    has not had Emptying study    Heart failure with preserved ejection fraction 2022    EF 55% on 3/22    History of supraventricular tachycardia     Hyperkalemia 2022    Hypertensive emergency 2022    Sickle cell trait 2022    Type 2 diabetes mellitus      Past Surgical History:   Procedure Laterality Date     SECTION      x 3    COLONOSCOPY      COLONOSCOPY N/A 2022    Procedure: COLONOSCOPY;  Surgeon: Jagdeep Cedeno MD;  Location: [facility];  Service: Endoscopy;  Laterality: N/A;    DESTRUCTION, CILIARY BODY, USING LASER " Left 03/08/2024    Procedure: Cyclophotocoagulation G-probe, retrobulbar chlorpromazine left eye;  Surgeon: Fidel Wise MD;  Location: [address];  Service: Ophthalmology;  Laterality: Left;    ENDOSCOPIC ULTRASOUND OF UPPER GASTROINTESTINAL TRACT N/A 12/16/2023    Procedure: ULTRASOUND, UPPER GI TRACT, ENDOSCOPIC;  Surgeon: Micky Paredes III, MD;  Location: [facility];  Service: Endoscopy;  Laterality: N/A;    ESOPHAGOGASTRODUODENOSCOPY N/A 10/18/2019    Procedure: ESOPHAGOGASTRODUODENOSCOPY (EGD);  Surgeon: Gianluca Mendez MD;  Location: [facility];  Service: Endoscopy;  Laterality: N/A;    ESOPHAGOGASTRODUODENOSCOPY N/A 08/24/2022    Procedure: EGD (ESOPHAGOGASTRODUODENOSCOPY);  Surgeon: Micky Paredes III, MD;  Location: [facility];  Service: Endoscopy;  Laterality: N/A;    ESOPHAGOGASTRODUODENOSCOPY N/A 12/05/2022    Procedure: EGD (ESOPHAGOGASTRODUODENOSCOPY);  Surgeon: Marcelo Zhong MD;  Location: [facility];  Service: Endoscopy;  Laterality: N/A;    ESOPHAGOGASTRODUODENOSCOPY N/A 9/13/2024    Procedure: EGD (ESOPHAGOGASTRODUODENOSCOPY);  Surgeon: Marcelo Zhong MD;  Location: [facility];  Service: Endoscopy;  Laterality: N/A;    EYE SURGERY      INSERTION, CATHETER, TUNNELED N/A 06/17/2023    Procedure: Insertion,catheter,tunneled;  Surgeon: Carlos Thurman Jr., MD;  Location: [facility] OR;  Service: General;  Laterality: N/A;    LAPAROSCOPIC CHOLECYSTECTOMY N/A 07/30/2022    Procedure: CHOLECYSTECTOMY, LAPAROSCOPIC;  Surgeon: Grey Perez MD;  Location: [facility] OR;  Service: General;  Laterality: N/A;    PLACEMENT OF DUAL-LUMEN VASCULAR CATHETER Left 07/12/2022    Procedure: INSERTION-CATHETER-JOSEPH;  Surgeon: Dionte Gan MD;  Location: [facility] OR;  Service: General;  Laterality: Left;    PLACEMENT OF DUAL-LUMEN VASCULAR CATHETER Right 07/26/2022    Procedure: INSERTION-CATHETER-Hemosplit;  Surgeon: Dionte Gan MD;  Location: [facility];  Service: General;  Laterality: Right;      Family History   Problem Relation Name Age of Onset    Diabetes Mother      Diabetes Father       Social History     Tobacco Use    Smoking status: Never    Smokeless tobacco: Never   Substance Use Topics    Alcohol use: Not Currently    Drug use: No     Review of Systems   Constitutional:  Negative for chills, diaphoresis and fever.   Respiratory:  Negative for shortness of breath.    Cardiovascular:  Negative for chest pain.   Gastrointestinal:  Positive for constipation, nausea and vomiting. Negative for abdominal pain and diarrhea.   Genitourinary:  Positive for flank pain (left).        Does not produce urine   Musculoskeletal:  Negative for back pain and myalgias.   Neurological:  Negative for dizziness, weakness, light-headedness and headaches.       Physical Exam     Initial Vitals   BP Pulse Resp Temp SpO2   09/19/24 0956 09/19/24 0956 09/19/24 0956 09/19/24 0956 09/19/24 0957   (!) 131/97 (!) 112 (!) 22 97.9 °F (36.6 °C) 100 %      MAP       --                Physical Exam    Nursing note and vitals reviewed.  Constitutional: She appears well-developed and well-nourished. She is not diaphoretic. She is active.  Non-toxic appearance. She does not have a sickly appearance. She does not appear ill. She appears distressed (Writhing around in the bed secondary to pain).   HENT:   Head: Normocephalic and atraumatic.   Right Ear: External ear normal.   Left Ear: External ear normal.   Nose: Nose normal.   Eyes: Conjunctivae, EOM and lids are normal. Pupils are equal, round, and reactive to light. Right eye exhibits no discharge. Left eye exhibits no discharge.   Neck: Neck supple.   Normal range of motion.   Full passive range of motion without pain.     Cardiovascular:  Regular rhythm.   Tachycardia present.         Pulmonary/Chest: Effort normal and breath sounds normal. No respiratory distress.   Abdominal: Abdomen is soft. She exhibits no distension. There is no abdominal tenderness.   No right CVA  tenderness.  No left CVA tenderness. There is no rebound and no guarding.   Musculoskeletal:         General: Normal range of motion.      Cervical back: Full passive range of motion without pain, normal range of motion and neck supple.     Neurological: She is alert and oriented to person, place, and time. GCS eye subscore is 4. GCS verbal subscore is 5. GCS motor subscore is 6.   Skin: Skin is dry. Capillary refill takes less than 2 seconds.         ED Course   Procedures  Labs Reviewed   CBC W/ AUTO DIFFERENTIAL - Abnormal       Result Value    WBC 8.46      RBC 3.78 (*)     Hemoglobin 11.0 (*)     Hematocrit 32.5 (*)     MCV 86      MCH 29.1      MCHC 33.8      RDW 19.0 (*)     Platelets 197      MPV 9.7      Immature Granulocytes 0.2      Gran # (ANC) 6.0      Immature Grans (Abs) 0.02      Lymph # 1.5      Mono # 0.9      Eos # 0.0      Baso # 0.03      nRBC 0      Gran % 71.1      Lymph % 17.1 (*)     Mono % 10.8      Eosinophil % 0.4      Basophil % 0.4      Differential Method Automated     COMPREHENSIVE METABOLIC PANEL - Abnormal    Sodium 141      Potassium 4.1      Chloride 96      CO2 20 (*)     Glucose 196 (*)     BUN 26 (*)     Creatinine 6.6 (*)     Calcium 10.0      Total Protein 8.2      Albumin 4.0      Total Bilirubin 1.9 (*)     Alkaline Phosphatase 102      AST 16      ALT 10      eGFR 8 (*)     Anion Gap 25 (*)    PHOSPHORUS - Abnormal    Phosphorus 2.6 (*)    BETA - HYDROXYBUTYRATE, SERUM - Abnormal    Beta-Hydroxybutyrate 3.4 (*)    POCT GLUCOSE - Abnormal    POCT Glucose 198 (*)    ISTAT PROCEDURE - Abnormal    POC PH 7.366      POC PCO2 48.8 (*)     POC PO2 35 (*)     POC HCO3 27.9      POC BE 3 (*)     POC SATURATED O2 64      POC TCO2 29      Sample VENOUS      Site Other      Allens Test N/A      DelSys Nasal Can      Mode SPONT      Flow 2      Sp02 100     MAGNESIUM    Magnesium 1.9     POCT GLUCOSE MONITORING CONTINUOUS     EKG Readings: (Independently Interpreted)   EKG showed sinus  tachycardia rhythm with 111 bpm. VT interval 170 ms. QRS 80 ms.  ms. No stemi noted. Signed by Dr. Manzo.          Imaging Results    None          Medications   morphine injection 4 mg (4 mg Intravenous Given 9/19/24 1137)   promethazine (PHENERGAN) 25 mg in 0.9% NaCl 50 mL IVPB (0 mg Intravenous Stopped 9/19/24 1111)   HYDROmorphone injection 1 mg (1 mg Intravenous Given 9/19/24 1257)     Medical Decision Making  34-year-old female with a past medical history of hypertension, diabetes, CHF, end-stage renal disease on dialysis Tuesday, Thursdays, and Saturdays presents to the ED for left flank pain.   Patient's chart and medical history reviewed.    Ddx:  DKA  CKD  Chronic pain    Patient's vitals reviewed.  Afebrile, no respiratory distress, and nontoxic-appearing in the ED. patient appears to be distressed and with the ring and pain, otherwise physical exam was unremarkable besides mild tachycardia secondary to pain.  Point of care glucose is 198.  Patient given morphine and Phenergan. EKG showed sinus tachycardia rhythm with 111 bpm. VT interval 170 ms. QRS 80 ms.  ms. No stemi noted. Signed by Dr. Manzo.  Magnesium in normal range.  Beta hydroxybutyrate is 3.4.  CBC showed anemia of 3.78 with an H&H of 11 and 32.5 respectively, no need for transfusion at this time.  No leukocytosis.  Hypophosphatemia of 2.6.  CMP showed a glucose of 196, BUN of 26, creatinine of 6.6, GFR of 8, anion gap of 25.  Patient has known end-stage renal disease on dialysis.  Patient has not had dialysis since Monday.  Per chart review her anion gap and beta hydroxybutyrate is trending upwards.  This is most likely early DKA.  Discussed this case with Dr. Manzo.  Discussed this case with Hospital Medicine Crissy Michaels NP.  Patient will be admitted to observation for further management of this chronic kidney disease requiring dialysis, hyperglycemia, and early DKA.  Patient agrees with this plan.    Amount and/or  Complexity of Data Reviewed  External Data Reviewed: labs, radiology, ECG and notes.  Labs: ordered.  ECG/medicine tests: ordered.    Risk  Prescription drug management.               ED Course as of 09/19/24 1456   Thu Sep 19, 2024   1031 Sinus tachycardia 111 beats per minute left axis no ST elevation or depression or T-wave inversion independently interpreted [EF]      ED Course User Index  [EF] Elfego Manzo MD                           Clinical Impression:  Final diagnoses:  [R00.0] Tachycardia  [N18.6, Z99.2] CKD (chronic kidney disease) requiring chronic dialysis (Primary)  [R73.9] Hyperglycemia  [R10.9] Left flank pain  [R11.2] Nausea and vomiting, unspecified vomiting type          ED Disposition Condition    Observation Stable                Holdsworth, Alayna, PA-C  09/19/24 1459

## 2024-09-19 NOTE — ASSESSMENT & PLAN NOTE
Chronic, controlled. Latest blood pressure and vitals reviewed-     Temp:  [97.9 °F (36.6 °C)-98.4 °F (36.9 °C)]   Pulse:  []   Resp:  [15-22]   BP: (113-144)/(38-97)   SpO2:  [98 %-100 %] .   Home meds for hypertension were reviewed and noted below.   Hypertension Medications               hydrALAZINE (APRESOLINE) 50 MG tablet Take 1 tablet (50 mg total) by mouth every 8 (eight) hours.            While in the hospital, will manage blood pressure as follows; Continue home antihypertensive regimen    Will utilize p.r.n. blood pressure medication only if patient's blood pressure greater than 160/100 and she develops symptoms such as worsening chest pain or shortness of breath.

## 2024-09-19 NOTE — ASSESSMENT & PLAN NOTE
Likely secondary to Dialysis.        Daily CBC, CMP, Mag, Phos,   Repeat beta hydroxybutyrate in AM

## 2024-09-20 LAB
ALBUMIN SERPL BCP-MCNC: 3.4 G/DL (ref 3.5–5.2)
ALP SERPL-CCNC: 91 U/L (ref 55–135)
ALT SERPL W/O P-5'-P-CCNC: 9 U/L (ref 10–44)
ANION GAP SERPL CALC-SCNC: 19 MMOL/L (ref 8–16)
AST SERPL-CCNC: 18 U/L (ref 10–40)
B-OH-BUTYR BLD STRIP-SCNC: 4.6 MMOL/L (ref 0–0.5)
B-OH-BUTYR BLD STRIP-SCNC: 6.2 MMOL/L (ref 0–0.5)
BILIRUB SERPL-MCNC: 1.1 MG/DL (ref 0.1–1)
BUN SERPL-MCNC: 10 MG/DL (ref 6–20)
CALCIUM SERPL-MCNC: 9.7 MG/DL (ref 8.7–10.5)
CHLORIDE SERPL-SCNC: 99 MMOL/L (ref 95–110)
CO2 SERPL-SCNC: 22 MMOL/L (ref 23–29)
CREAT SERPL-MCNC: 3.9 MG/DL (ref 0.5–1.4)
ERYTHROCYTE [DISTWIDTH] IN BLOOD BY AUTOMATED COUNT: 18.6 % (ref 11.5–14.5)
EST. GFR  (NO RACE VARIABLE): 15 ML/MIN/1.73 M^2
GLUCOSE SERPL-MCNC: 148 MG/DL (ref 70–110)
HCT VFR BLD AUTO: 28.8 % (ref 37–48.5)
HGB BLD-MCNC: 9.5 G/DL (ref 12–16)
MAGNESIUM SERPL-MCNC: 2 MG/DL (ref 1.6–2.6)
MCH RBC QN AUTO: 29.1 PG (ref 27–31)
MCHC RBC AUTO-ENTMCNC: 33 G/DL (ref 32–36)
MCV RBC AUTO: 88 FL (ref 82–98)
PHOSPHATE SERPL-MCNC: 3.8 MG/DL (ref 2.7–4.5)
PLATELET # BLD AUTO: 138 K/UL (ref 150–450)
PMV BLD AUTO: 9.5 FL (ref 9.2–12.9)
POCT GLUCOSE: 107 MG/DL (ref 70–110)
POCT GLUCOSE: 162 MG/DL (ref 70–110)
POCT GLUCOSE: 185 MG/DL (ref 70–110)
POCT GLUCOSE: 187 MG/DL (ref 70–110)
POCT GLUCOSE: 207 MG/DL (ref 70–110)
POCT GLUCOSE: 311 MG/DL (ref 70–110)
POTASSIUM SERPL-SCNC: 3.8 MMOL/L (ref 3.5–5.1)
PROT SERPL-MCNC: 7.1 G/DL (ref 6–8.4)
RBC # BLD AUTO: 3.27 M/UL (ref 4–5.4)
SODIUM SERPL-SCNC: 140 MMOL/L (ref 136–145)
WBC # BLD AUTO: 7.09 K/UL (ref 3.9–12.7)

## 2024-09-20 PROCEDURE — 63600175 PHARM REV CODE 636 W HCPCS: Performed by: NURSE PRACTITIONER

## 2024-09-20 PROCEDURE — 99900035 HC TECH TIME PER 15 MIN (STAT)

## 2024-09-20 PROCEDURE — 82010 KETONE BODYS QUAN: CPT

## 2024-09-20 PROCEDURE — 82010 KETONE BODYS QUAN: CPT | Mod: 91 | Performed by: INTERNAL MEDICINE

## 2024-09-20 PROCEDURE — 63600175 PHARM REV CODE 636 W HCPCS

## 2024-09-20 PROCEDURE — 36415 COLL VENOUS BLD VENIPUNCTURE: CPT | Performed by: INTERNAL MEDICINE

## 2024-09-20 PROCEDURE — 96372 THER/PROPH/DIAG INJ SC/IM: CPT

## 2024-09-20 PROCEDURE — G0378 HOSPITAL OBSERVATION PER HR: HCPCS

## 2024-09-20 PROCEDURE — 36415 COLL VENOUS BLD VENIPUNCTURE: CPT

## 2024-09-20 PROCEDURE — 25000003 PHARM REV CODE 250

## 2024-09-20 PROCEDURE — 80053 COMPREHEN METABOLIC PANEL: CPT

## 2024-09-20 PROCEDURE — 85027 COMPLETE CBC AUTOMATED: CPT

## 2024-09-20 PROCEDURE — 83735 ASSAY OF MAGNESIUM: CPT

## 2024-09-20 PROCEDURE — 11000001 HC ACUTE MED/SURG PRIVATE ROOM

## 2024-09-20 PROCEDURE — 25000003 PHARM REV CODE 250: Performed by: INTERNAL MEDICINE

## 2024-09-20 PROCEDURE — 84100 ASSAY OF PHOSPHORUS: CPT

## 2024-09-20 PROCEDURE — 94761 N-INVAS EAR/PLS OXIMETRY MLT: CPT

## 2024-09-20 RX ORDER — HYDROCODONE BITARTRATE AND ACETAMINOPHEN 5; 325 MG/1; MG/1
1 TABLET ORAL EVERY 6 HOURS PRN
Status: DISCONTINUED | OUTPATIENT
Start: 2024-09-20 | End: 2024-09-23

## 2024-09-20 RX ORDER — HYDROMORPHONE HYDROCHLORIDE 1 MG/ML
0.5 INJECTION, SOLUTION INTRAMUSCULAR; INTRAVENOUS; SUBCUTANEOUS ONCE
Status: COMPLETED | OUTPATIENT
Start: 2024-09-20 | End: 2024-09-20

## 2024-09-20 RX ORDER — SODIUM CHLORIDE 9 MG/ML
INJECTION, SOLUTION INTRAVENOUS CONTINUOUS
Status: DISCONTINUED | OUTPATIENT
Start: 2024-09-20 | End: 2024-09-21

## 2024-09-20 RX ADMIN — APIXABAN 5 MG: 2.5 TABLET, FILM COATED ORAL at 12:09

## 2024-09-20 RX ADMIN — INSULIN ASPART 2 UNITS: 100 INJECTION, SOLUTION INTRAVENOUS; SUBCUTANEOUS at 09:09

## 2024-09-20 RX ADMIN — INSULIN GLARGINE 20 UNITS: 100 INJECTION, SOLUTION SUBCUTANEOUS at 09:09

## 2024-09-20 RX ADMIN — SODIUM CHLORIDE: 9 INJECTION, SOLUTION INTRAVENOUS at 01:09

## 2024-09-20 RX ADMIN — SEVELAMER CARBONATE 800 MG: 800 TABLET, FILM COATED ORAL at 06:09

## 2024-09-20 RX ADMIN — HYDROCODONE BITARTRATE AND ACETAMINOPHEN 1 TABLET: 5; 325 TABLET ORAL at 06:09

## 2024-09-20 RX ADMIN — PREDNISOLONE ACETATE 1 DROP: 10 SUSPENSION/ DROPS OPHTHALMIC at 09:09

## 2024-09-20 RX ADMIN — LEVETIRACETAM 500 MG: 250 TABLET, FILM COATED ORAL at 09:09

## 2024-09-20 RX ADMIN — SUCRALFATE 1 G: 1 TABLET ORAL at 06:09

## 2024-09-20 RX ADMIN — HYDROMORPHONE HYDROCHLORIDE 0.5 MG: 1 INJECTION, SOLUTION INTRAMUSCULAR; INTRAVENOUS; SUBCUTANEOUS at 06:09

## 2024-09-20 RX ADMIN — APIXABAN 5 MG: 2.5 TABLET, FILM COATED ORAL at 09:09

## 2024-09-20 RX ADMIN — INSULIN ASPART 2 UNITS: 100 INJECTION, SOLUTION INTRAVENOUS; SUBCUTANEOUS at 12:09

## 2024-09-20 RX ADMIN — SUCRALFATE 1 G: 1 TABLET ORAL at 12:09

## 2024-09-20 RX ADMIN — FLUOXETINE HYDROCHLORIDE 40 MG: 20 CAPSULE ORAL at 12:09

## 2024-09-20 RX ADMIN — LORAZEPAM 1 MG: 2 INJECTION INTRAMUSCULAR; INTRAVENOUS at 08:09

## 2024-09-20 RX ADMIN — LORAZEPAM 1 MG: 2 INJECTION INTRAMUSCULAR; INTRAVENOUS at 05:09

## 2024-09-20 RX ADMIN — SUCRALFATE 1 G: 1 TABLET ORAL at 09:09

## 2024-09-20 RX ADMIN — SEVELAMER CARBONATE 800 MG: 800 TABLET, FILM COATED ORAL at 12:09

## 2024-09-20 RX ADMIN — LEVETIRACETAM 500 MG: 250 TABLET, FILM COATED ORAL at 12:09

## 2024-09-20 RX ADMIN — PROMETHAZINE HYDROCHLORIDE 12.5 MG: 25 INJECTION INTRAMUSCULAR; INTRAVENOUS at 06:09

## 2024-09-20 RX ADMIN — PANTOPRAZOLE SODIUM 40 MG: 40 TABLET, DELAYED RELEASE ORAL at 12:09

## 2024-09-20 NOTE — HOSPITAL COURSE
Patient is a 34 year old patient with diabetes and ESRD on HD, frequent hospital admissions for multiple complains, chronic abdominal pain. Patient was recently discharged few days ago. Patient came back with worsening pain on the left flank, nausea and vomiting. Patient is euglycemic. However beta hydroxybutyrate was elevated. Patient was hydrated. Acidosis improved. The patient continued to c/o abdominal pain. Prn pain meds were given. The patient developed n/v once again, and prn antiemetics were given.

## 2024-09-20 NOTE — PLAN OF CARE
Problem: Violence Risk or Actual  Goal: Anger and Impulse Control  Outcome: Progressing     Problem: Hemodialysis  Goal: Safe, Effective Therapy Delivery  Outcome: Progressing  Goal: Effective Tissue Perfusion  Outcome: Progressing     Problem: Adult Inpatient Plan of Care  Goal: Optimal Comfort and Wellbeing  Outcome: Progressing     Problem: Diabetes Comorbidity  Goal: Blood Glucose Level Within Targeted Range  Outcome: Progressing     Problem: Wound  Goal: Optimal Functional Ability  Outcome: Progressing  Goal: Optimal Pain Control and Function  Outcome: Progressing

## 2024-09-20 NOTE — SUBJECTIVE & OBJECTIVE
Past Medical History:   Diagnosis Date    ESRD on hemodialysis 2022    Gastritis 2022    EGD was 22    Gastroparesis 2022    has not had Emptying study    Heart failure with preserved ejection fraction 2022    EF 55% on 3/22    History of supraventricular tachycardia     Hyperkalemia 2022    Hypertensive emergency 2022    Sickle cell trait 2022    Type 2 diabetes mellitus        Past Surgical History:   Procedure Laterality Date     SECTION      x 3    COLONOSCOPY      COLONOSCOPY N/A 2022    Procedure: COLONOSCOPY;  Surgeon: Jagdeep Cedeno MD;  Location: [facility];  Service: Endoscopy;  Laterality: N/A;    DESTRUCTION, CILIARY BODY, USING LASER Left 2024    Procedure: Cyclophotocoagulation G-probe, retrobulbar chlorpromazine left eye;  Surgeon: Fidel Wise MD;  Location: [address];  Service: Ophthalmology;  Laterality: Left;    ENDOSCOPIC ULTRASOUND OF UPPER GASTROINTESTINAL TRACT N/A 2023    Procedure: ULTRASOUND, UPPER GI TRACT, ENDOSCOPIC;  Surgeon: Micky Paredes III, MD;  Location: [facility];  Service: Endoscopy;  Laterality: N/A;    ESOPHAGOGASTRODUODENOSCOPY N/A 10/18/2019    Procedure: ESOPHAGOGASTRODUODENOSCOPY (EGD);  Surgeon: Gianluca Mendez MD;  Location: [facility];  Service: Endoscopy;  Laterality: N/A;    ESOPHAGOGASTRODUODENOSCOPY N/A 2022    Procedure: EGD (ESOPHAGOGASTRODUODENOSCOPY);  Surgeon: Micky Paredes III, MD;  Location: [facility];  Service: Endoscopy;  Laterality: N/A;    ESOPHAGOGASTRODUODENOSCOPY N/A 2022    Procedure: EGD (ESOPHAGOGASTRODUODENOSCOPY);  Surgeon: Marcelo Zhong MD;  Location: [facility];  Service: Endoscopy;  Laterality: N/A;    ESOPHAGOGASTRODUODENOSCOPY N/A 2024    Procedure: EGD (ESOPHAGOGASTRODUODENOSCOPY);  Surgeon: Marcelo Zhong MD;  Location: [facility];  Service: Endoscopy;  Laterality: N/A;    EYE SURGERY      INSERTION, CATHETER, TUNNELED N/A  "06/17/2023    Procedure: Insertion,catheter,tunneled;  Surgeon: Carlos Thurman Jr., MD;  Location: [facility] OR;  Service: General;  Laterality: N/A;    LAPAROSCOPIC CHOLECYSTECTOMY N/A 07/30/2022    Procedure: CHOLECYSTECTOMY, LAPAROSCOPIC;  Surgeon: Grey Perez MD;  Location: [facility] OR;  Service: General;  Laterality: N/A;    PLACEMENT OF DUAL-LUMEN VASCULAR CATHETER Left 07/12/2022    Procedure: INSERTION-CATHETER-JOSEPH;  Surgeon: Dionte Gan MD;  Location: [facility];  Service: General;  Laterality: Left;    PLACEMENT OF DUAL-LUMEN VASCULAR CATHETER Right 07/26/2022    Procedure: INSERTION-CATHETER-Hemosplit;  Surgeon: Dionte Gan MD;  Location: [facility] OR;  Service: General;  Laterality: Right;       Review of patient's allergies indicates:   Allergen Reactions    Droperidol Hives    Haldol [haloperidol lactate] Hives    Penicillins Hives    Droperidol (bulk) Anxiety     STATES "FREAKS OUT".  TOLERATES HALDOL PRIOR TO ARRIVAL AT ANOTHER FACILITY TODAY.        Current Facility-Administered Medications on File Prior to Encounter   Medication    [COMPLETED] insulin regular injection 7 Units 0.07 mL     Current Outpatient Medications on File Prior to Encounter   Medication Sig    albuterol (VENTOLIN HFA) 90 mcg/actuation inhaler Inhale 2 puffs every 4 hours by inhalation route.    apixaban (ELIQUIS) 5 mg Tab Take 1 tablet (5 mg total) by mouth 2 (two) times daily. Patient w/ thrombocytopenia <50, so held during admission, follow-up with hematologist about restarting    brinzolamide (AZOPT) 1 % ophthalmic suspension Place1 drop in left eye 3 times a day for 30 days    cetirizine (ZYRTEC) 10 MG tablet Take 1 tablet every day by oral route.    FLUoxetine 40 MG capsule Take 1 capsule every day by oral route.    fluticasone propionate (FLONASE ALLERGY RELIEF) 50 mcg/actuation nasal spray Kiron 1 spray every day by intranasal route.    gabapentin (NEURONTIN) 300 MG capsule Take 2 capsules twice a day by oral " route for 90 days.    hydrALAZINE (APRESOLINE) 50 MG tablet Take 1 tablet (50 mg total) by mouth every 8 (eight) hours.    insulin aspart U-100 (NOVOLOG) 100 unit/mL (3 mL) InPn pen Inject 8 Units into the skin 3 (three) times daily with meals.    insulin glargine U-100, Lantus, (LANTUS SOLOSTAR U-100 INSULIN) 100 unit/mL (3 mL) InPn pen Inject 22 units every day by subcutaneous route in the evening for 30 days, for diabetes.    levETIRAcetam (KEPPRA) 500 MG Tab Take 1 tablet (500 mg total) by mouth 2 (two) times daily.    ondansetron (ZOFRAN-ODT) 4 MG TbDL Place 1 tablet (4 mg) on or under the tongue every 8 hours for 10 days.    pantoprazole (PROTONIX) 40 MG tablet Take 1 tablet (40 mg total) by mouth once daily.    prednisoLONE acetate (PRED FORTE) 1 % DrpS Place 1 drop into the left eye 2 (two) times daily.    promethazine (PHENERGAN) 25 MG tablet Take 1 tablet (25 mg total) by mouth every 6 (six) hours as needed.    sevelamer carbonate (RENVELA) 800 mg Tab Take 1 tablet (800 mg total) by mouth 3 (three) times daily with meals.    sucralfate (CARAFATE) 1 gram tablet Take 1 tablet (1 g total) by mouth 4 (four) times daily.    atropine 1% (ISOPTO ATROPINE) 1 % Drop Place 1 drop into the left eye 2 (two) times a day.    blood sugar diagnostic (TRUE METRIX GLUCOSE TEST STRIP) Strp Use 1 strip to check blood glucose three times daily    blood-glucose meter (TRUE METRIX GLUCOSE METER) Misc Use to check blood glucose    blood-glucose meter,continuous Misc USE WITH SENSORS    blood-glucose sensor (DEXCOM G7 SENSOR) Heather Use as directed for CGM. Change sensor every 10 days    blood-glucose sensor Heather CHANGE EVERY 10 DAYS    brimonidine 0.15 % OPTH DROP (ALPHAGAN) 0.15 % ophthalmic solution Place 1 drop into both eyes 3 (three) times daily.    busPIRone (BUSPAR) 10 MG tablet Take 1 tablet (10 mg total) by mouth 3 (three) times daily as needed (anxiety).    dorzolamide-timolol 2-0.5% (COSOPT) 22.3-6.8 mg/mL ophthalmic  "solution place 1 drop in left eye twice a day  for 30 days    lancets (TRUEPLUS LANCETS) 33 gauge Misc Use 1 to check blood glucose three times daily    oxyCODONE-acetaminophen (PERCOCET)  mg per tablet Take 1 tablet by mouth every 4 (four) hours as needed for Pain.    pen needle, diabetic 31 gauge x 3/16" Ndle Use as directed with insulin once daily    timolol maleate 0.5% (TIMOPTIC) 0.5 % Drop Place 1 drop in left eye 2 times a day for 30 days    [DISCONTINUED] atenoloL (TENORMIN) 50 MG tablet Take 1 tablet (50 mg total) by mouth every other day.    [DISCONTINUED] insulin detemir U-100, Levemir, (LEVEMIR FLEXTOUCH U100 INSULIN) 100 unit/mL (3 mL) InPn pen Inject 22 units every day by subcutaneous route in the evening for 90 days.    [DISCONTINUED] omeprazole (PRILOSEC) 20 MG capsule Take 2 capsules (40 mg total) by mouth once daily. for 10 days     Family History       Problem Relation (Age of Onset)    Diabetes Mother, Father          Tobacco Use    Smoking status: Never    Smokeless tobacco: Never   Substance and Sexual Activity    Alcohol use: Not Currently    Drug use: No    Sexual activity: Not Currently     Partners: Male     Birth control/protection: I.U.D.     Review of Systems   Gastrointestinal:  Positive for abdominal pain and nausea.   Musculoskeletal:  Positive for back pain.   All other systems reviewed and are negative.    Objective:     Vital Signs (Most Recent):  Temp: 97.1 °F (36.2 °C) (09/19/24 1914)  Pulse: 89 (09/19/24 2200)  Resp: 18 (09/19/24 1925)  BP: 94/62 (09/19/24 2200)  SpO2: 99 % (09/19/24 1925) Vital Signs (24h Range):  Temp:  [97.1 °F (36.2 °C)-98.4 °F (36.9 °C)] 97.1 °F (36.2 °C)  Pulse:  [] 89  Resp:  [15-22] 18  SpO2:  [98 %-100 %] 99 %  BP: ()/() 94/62     Weight: 47.5 kg (104 lb 11.5 oz)  Body mass index is 19.15 kg/m².     Physical Exam  Vitals reviewed.   Constitutional:       Appearance: She is ill-appearing.      Comments: Chronically ill appearing "   HENT:      Head: Normocephalic and atraumatic.      Nose: Nose normal.      Mouth/Throat:      Mouth: Mucous membranes are dry.      Pharynx: Oropharynx is clear.   Eyes:      Conjunctiva/sclera:      Left eye: Hemorrhage present.      Pupils: Pupils are equal, round, and reactive to light.      Comments: Right eye   Cardiovascular:      Rate and Rhythm: Regular rhythm. Tachycardia present.      Pulses:           Radial pulses are 2+ on the right side and 2+ on the left side.        Dorsalis pedis pulses are 2+ on the right side and 2+ on the left side.      Arteriovenous access: Left arteriovenous access is present.  Pulmonary:      Effort: Pulmonary effort is normal.      Breath sounds: Normal breath sounds.   Abdominal:      General: Bowel sounds are normal.      Palpations: Abdomen is soft.      Tenderness: There is generalized abdominal tenderness.          Comments: Bruising noted   Musculoskeletal:         General: Normal range of motion.      Cervical back: Normal range of motion and neck supple.      Right lower leg: No edema.      Left lower leg: No edema.   Skin:     General: Skin is warm and dry.      Capillary Refill: Capillary refill takes less than 2 seconds.   Neurological:      General: No focal deficit present.      Mental Status: She is alert and oriented to person, place, and time. Mental status is at baseline.   Psychiatric:         Attention and Perception: Attention normal.         Speech: Speech is rapid and pressured.         Behavior: Behavior is agitated.      Comments: Agitated and writhing in bed               CRANIAL NERVES     CN III, IV, VI   Pupils are equal, round, and reactive to light.       Significant Labs: All pertinent labs within the past 24 hours have been reviewed.  A1C:   Recent Labs   Lab 06/23/24  0712 09/10/24  2106   HGBA1C 8.5* 9.0*     ABGs:   Recent Labs   Lab 09/19/24  1448   PH 7.366   PCO2 48.8*   HCO3 27.9   POCSATURATED 64   BE 3*   PO2 35*     Bilirubin:    Recent Labs   Lab 09/14/24  2309 09/15/24  0917 09/17/24  1318 09/18/24 2119 09/19/24  1025   BILITOT 1.0 1.6* 1.0 1.3* 1.9*     BMP:   Recent Labs   Lab 09/19/24  1025   *      K 4.1   CL 96   CO2 20*   BUN 26*   CREATININE 6.6*   CALCIUM 10.0   MG 1.9     CBC:   Recent Labs   Lab 09/18/24 2119 09/19/24  1025   WBC 6.83 8.46   HGB 10.0* 11.0*   HCT 29.1* 32.5*    197     CMP:   Recent Labs   Lab 09/18/24 2119 09/19/24  1025    141   K 3.8 4.1   CL 96 96   CO2 24 20*   * 196*   BUN 23* 26*   CREATININE 5.6* 6.6*   CALCIUM 9.2 10.0   PROT 7.3 8.2   ALBUMIN 3.5 4.0   BILITOT 1.3* 1.9*   ALKPHOS 99 102   AST 13 16   ALT 8* 10   ANIONGAP 21* 25*     Magnesium:   Recent Labs   Lab 09/19/24  1025   MG 1.9     POCT Glucose:   Recent Labs   Lab 09/18/24  2256 09/19/24  1019 09/19/24  1827   POCTGLUCOSE 387* 198* 304*     TSH:   Recent Labs   Lab 09/14/24  2309   TSH 1.618     Beta-hydroxybutyrate: 3.4        Significant Imaging: I have reviewed all pertinent imaging results/findings within the past 24 hours.  No imaging performed in last 24 hours

## 2024-09-20 NOTE — ASSESSMENT & PLAN NOTE
beta hydroxybutyrate elevated   Patient is slightly acidotic, but euglycemic  - start IVF   - rpt Beta hydroxybutyrate this evening, if still elevated, will need to start on DKA protocol

## 2024-09-20 NOTE — PROGRESS NOTES
09/19/24 1963   Required for all Hemodialysis Patients   Hepatitis Status negative   Handoff Report   Received From SHAILA Samson   Given To SHAILA Stovall   Treatment Type   Treatment Type Maintenance   Vital Signs   Temp 97.4 °F (36.3 °C)   Pulse 86   Resp 18   SpO2 99 %   /86   Assessments (Pre/Post)   Consent Obtained yes   Safety vein preservation armband present   Date Hepatitis Profile Obtained 02/27/24   Blood Liters Processed (BLP) 58.9   Transport Modality not applicable   Level of Consciousness (AVPU) alert   Dialyzer Clearance mildly streaked        Hemodialysis AV Fistula Left upper arm   No placement date or time found.   Location: Left upper arm   Needle Size 16ga   Site Assessment Clean;Dry;Intact;No redness;No swelling   Patency Present;Thrill;Bruit   Status Deaccessed   Flows Good   Dressing Status Clean;Dry;Intact   Site Condition No complications   Dressing Gauze   Post-Hemodialysis Assessment   Rinseback Volume (mL) 250 mL   Blood Volume Processed (Liters) 58.9 L   Dialyzer Clearance Moderately streaked   Duration of Treatment 180 minutes   Additional Fluid Intake (mL) 500 mL   Total UF (mL) 1150 mL   Net Fluid Removal 650   Patient Response to Treatment lamar tx   Post-Treatment Weight 46 kg (101 lb 6.6 oz)   Treatment Weight Change -1.5   Arterial bleeding stop time (min) 5 min   Venous bleeding stop time (min) 5 min   Post-Hemodialysis Comments Pt lamar tx with some hypotension noted. pt with net uf of 650 cc.     Pt lamar hd with net uf of 650 cc.  Pt had some hypotension during hd and uf was off. Consent and hep b status verified. Report given to primary nurse.

## 2024-09-20 NOTE — HPI
Tabby Howard is a 34 year old female with a previous medical history of ESRD on dialysis Tue/Thur/Sat, chronic abdominal pain, HTN, Type II Diabetes, who presented to the ED  for left-sided flank pain. HPI is limited to chart review and ED note due to patient not being cooperative.  Per chart review, patient has multiple similar episodes in the past and has had multiple previous CT scans performed.  Also has had multiple hospitalizations for DKA in the past. Patient reports last dialysis was Monday 9/9/24 due to them scheduling her that day. She was unable to attend her normal Thursday session due to severe abdominal pain and vomiting. Of note she has had 5 ER visits over the past five days and most recent admission was on 9/10/24. She was admitted for DKA and insulin drip discontinued the next day. She received dialysis along with an EGD that was negative for any acute process. Initial ED evaluation showed elevated anion gap and betahydroxybutyrate but low normal PH. Dr. Bradford consulted and he placed orders for dialysis. Pain and nausea controlled with ativan and phenergan. Patient admitted by hospital medicine for further evaluation and management.

## 2024-09-20 NOTE — SUBJECTIVE & OBJECTIVE
Interval History: Patient is sleeping comfortably. States that she got something for anxiety earlier and feels better now. She has not eaten breakfast. Nausea is better.     Review of Systems  Objective:     Vital Signs (Most Recent):  Temp: 97.7 °F (36.5 °C) (09/20/24 1122)  Pulse: 97 (09/20/24 1122)  Resp: 18 (09/20/24 1122)  BP: (!) 138/91 (09/20/24 1122)  SpO2: 100 % (09/20/24 1122) Vital Signs (24h Range):  Temp:  [97.1 °F (36.2 °C)-98.5 °F (36.9 °C)] 97.7 °F (36.5 °C)  Pulse:  [] 97  Resp:  [16-22] 18  SpO2:  [98 %-100 %] 100 %  BP: ()/() 138/91     Weight: 47.5 kg (104 lb 11.5 oz)  Body mass index is 19.15 kg/m².    Intake/Output Summary (Last 24 hours) at 9/20/2024 1152  Last data filed at 9/20/2024 0540  Gross per 24 hour   Intake 550 ml   Output 1150 ml   Net -600 ml         Physical Exam  Vitals reviewed.   Constitutional:       Appearance: She is ill-appearing.      Comments: Chronically ill appearing   HENT:      Head: Normocephalic and atraumatic.      Nose: Nose normal.      Mouth/Throat:      Mouth: Mucous membranes are dry.      Pharynx: Oropharynx is clear.   Eyes:      Conjunctiva/sclera:      Left eye: Hemorrhage present.   Cardiovascular:      Rate and Rhythm: Regular rhythm.      Pulses:           Radial pulses are 2+ on the right side and 2+ on the left side.        Dorsalis pedis pulses are 2+ on the right side and 2+ on the left side.      Arteriovenous access: Left arteriovenous access is present.  Pulmonary:      Effort: Pulmonary effort is normal.      Breath sounds: Normal breath sounds.   Abdominal:      General: Bowel sounds are normal.      Palpations: Abdomen is soft.      Tenderness: There is generalized abdominal tenderness.   Musculoskeletal:         General: Normal range of motion.      Cervical back: Normal range of motion and neck supple.      Right lower leg: No edema.      Left lower leg: No edema.   Skin:     General: Skin is warm and dry.      Capillary  Refill: Capillary refill takes less than 2 seconds.   Neurological:      General: No focal deficit present.      Mental Status: She is alert and oriented to person, place, and time. Mental status is at baseline.   Psychiatric:         Attention and Perception: Attention normal.         Behavior: Behavior is withdrawn       Significant Labs: All pertinent labs within the past 24 hours have been reviewed.  CBC:   Recent Labs   Lab 09/18/24 2119 09/19/24  1025 09/20/24  0415   WBC 6.83 8.46 7.09   HGB 10.0* 11.0* 9.5*   HCT 29.1* 32.5* 28.8*    197 138*     CMP:   Recent Labs   Lab 09/18/24 2119 09/19/24  1025 09/20/24  0415    141 140   K 3.8 4.1 3.8   CL 96 96 99   CO2 24 20* 22*   * 196* 148*   BUN 23* 26* 10   CREATININE 5.6* 6.6* 3.9*   CALCIUM 9.2 10.0 9.7   PROT 7.3 8.2 7.1   ALBUMIN 3.5 4.0 3.4*   BILITOT 1.3* 1.9* 1.1*   ALKPHOS 99 102 91   AST 13 16 18   ALT 8* 10 9*   ANIONGAP 21* 25* 19*       Significant Imaging: I have reviewed all pertinent imaging results/findings within the past 24 hours.

## 2024-09-20 NOTE — ASSESSMENT & PLAN NOTE
Chronic, controlled. Latest blood pressure and vitals reviewed-     Home meds for hypertension were reviewed and noted below.   Hypertension Medications               hydrALAZINE (APRESOLINE) 50 MG tablet Take 1 tablet (50 mg total) by mouth every 8 (eight) hours.            While in the hospital, will manage blood pressure as follows; Continue home antihypertensive regimen    Will utilize p.r.n. blood pressure medication only if patient's blood pressure greater than 160/100 and she develops symptoms such as worsening chest pain or shortness of breath.

## 2024-09-20 NOTE — PLAN OF CARE
Problem: Violence Risk or Actual  Goal: Anger and Impulse Control  Outcome: Progressing     Problem: Diabetes Comorbidity  Goal: Blood Glucose Level Within Targeted Range  Outcome: Progressing     Problem: Wound  Goal: Optimal Coping  Outcome: Progressing     Problem: Fall Injury Risk  Goal: Absence of Fall and Fall-Related Injury  Outcome: Progressing

## 2024-09-20 NOTE — CARE UPDATE
09/20/24 0754   Patient Assessment/Suction   Level of Consciousness (AVPU) alert   Respiratory Effort Unlabored   Expansion/Accessory Muscles/Retractions no use of accessory muscles;no retractions;expansion symmetric   Rhythm/Pattern, Respiratory unlabored;depth regular;pattern regular;no shortness of breath reported   PRE-TX-O2   Device (Oxygen Therapy) room air   SpO2 100 %   Pulse Oximetry Type Intermittent   $ Pulse Oximetry - Multiple Charge Pulse Oximetry - Multiple   Pulse (!) 116   Resp 18   Aerosol Therapy   $ Aerosol Therapy Charges PRN treatment not required   Respiratory Treatment Status (SVN) PRN treatment not required

## 2024-09-20 NOTE — ASSESSMENT & PLAN NOTE
Patient's FSGs are uncontrolled due to hyperglycemia on current medication regimen.  Last A1c reviewed-   Lab Results   Component Value Date    HGBA1C 9.0 (H) 09/10/2024     Most recent fingerstick glucose reviewed-   Recent Labs   Lab 09/19/24  1827 09/20/24  0040 09/20/24  0433 09/20/24  0745   POCTGLUCOSE 304* 107 162* 187*       Current correctional scale  Low  Decrease anti-hyperglycemic dose as follows-   Antihyperglycemics (From admission, onward)    Start     Stop Route Frequency Ordered    09/19/24 2100  insulin glargine U-100 (Lantus) pen 20 Units         -- SubQ Nightly 09/19/24 1751    09/19/24 1732  insulin aspart U-100 pen 0-5 Units         -- SubQ Before meals & nightly PRN 09/19/24 1632        Hold Oral hypoglycemics while patient is in the hospital.

## 2024-09-20 NOTE — PLAN OF CARE
[facility] - Med/Surg  Initial Discharge Assessment       Primary Care Provider: Melony Kim NP    Admission Diagnosis: CKD (chronic kidney disease) requiring chronic dialysis [N18.6, Z99.2]    Admission Date: 9/19/2024  Expected Discharge Date: 9/20/2024    Transition of Care Barriers: (P) Social    Assessment completed by chart review. Pt with many admissions/ED visits. Pt's father brings her to HD TTS [facility] and will pick her up when she is medically ready for dc. PCP access health. [facility] but will change to pt's fartun r/t weekend approaching.    Payor: MEDICAID / Plan: HE[place]UE [place] / Product Type: Managed Medicaid /     Extended Emergency Contact Information  Primary Emergency Contact: Tanya Howard  Address: [address] [place], [place] [address] [facility]  Home Phone: [phone]  Mobile Phone: [phone]  Relation: Step parent  Preferred language: English   needed? No  Secondary Emergency Contact: Garcia Howard  Address: [address] [place] [address]  Mobile Phone: [phone]  Relation: Father  Preferred language: English   needed? No  Mother: Svetlana Freire  Mobile Phone: [phone]    Discharge Plan A: (P) Home with family  Discharge Plan B: (P) Home      [facility]  [address] [place], [place] [address]  [facility] [address]  Phone: [phone] Fax: [phone]      Initial Assessment (most recent)       Adult Discharge Assessment - 09/20/24 1329          Discharge Assessment    Assessment Type Discharge Planning Assessment (P)      Confirmed/corrected address, phone number and insurance Yes (P)      Confirmed Demographics Correct on Facesheet (P)      Source of Information health record (P)      People in Home child(tammy), dependent (P)      Do you expect to return to your current living situation? Yes (P)      Do you have help at home or someone to help you manage your care at home? No  (P)      Prior to hospitilization cognitive status: Unable to Assess (P)      Current cognitive status: Alert/Oriented (P)      Walking or Climbing Stairs Difficulty no (P)      Dressing/Bathing Difficulty no (P)      Equipment Currently Used at Home none (P)      Readmission within 30 days? Yes (P)      Do you currently have service(s) that help you manage your care at home? No (P)      Do you take prescription medications? Yes (P)      Do you have prescription coverage? Yes (P)      How do you get to doctors appointments? family or friend will provide;health plan transportation (P)      Are you on dialysis? No (P)      Do you take coumadin? No (P)      Discharge Plan A Home with family (P)      Discharge Plan B Home (P)      DME Needed Upon Discharge  none (P)      Discharge Plan discussed with: Patient (P)      Transition of Care Barriers Social (P)

## 2024-09-20 NOTE — CONSULTS
Patient with dry skin area to her right upper chest. Patient has chronic dry flaky skin.  No other areas of impaired skin integrity noted at this assessment. Plan moisturizing lotion to chest and dry skin areas daily.

## 2024-09-20 NOTE — PROGRESS NOTES
[facility] Medicine  Progress Note    Patient Name: Tabby Howard  MRN: [id number]  Patient Class: OP- Observation   Admission Date: 9/19/2024  Length of Stay: 0 days  Attending Physician: Mohini Yusuf MD  Primary Care Provider: Melony Kim NP        Subjective:     Principal Problem:High anion gap metabolic acidosis        HPI:  Tabby Howard is a 34 year old female with a previous medical history of ESRD on dialysis Tue/Thur/Sat, chronic abdominal pain, HTN, Type II Diabetes, who presented to the ED  for left-sided flank pain. HPI is limited to chart review and ED note due to patient not being cooperative.  Per chart review, patient has multiple similar episodes in the past and has had multiple previous CT scans performed.  Also has had multiple hospitalizations for DKA in the past. Patient reports last dialysis was Monday 9/9/24 due to them scheduling her that day. She was unable to attend her normal Thursday session due to severe abdominal pain and vomiting. Of note she has had 5 ER visits over the past five days and most recent admission was on 9/10/24. She was admitted for DKA and insulin drip discontinued the next day. She received dialysis along with an EGD that was negative for any acute process. Initial ED evaluation showed elevated anion gap and betahydroxybutyrate but low normal PH. Dr. Bradford consulted and he placed orders for dialysis. Pain and nausea controlled with ativan and phenergan. Patient admitted by hospital medicine for further evaluation and management.     Overview/Hospital Course:  Patient is a 34 year old patient with diabetes and ESRD on HD, frequent hospital admissions for multiple complains, chronic abdominal pain. Patient was recently discharged few days ago. Patient came back with worsening pain on the left flank, nausea and vomiting. Patient is euglycemic. However beta hydroxybutyrate was elevated. Patient was hydrated.     Interval  History: Patient is sleeping comfortably. States that she got something for anxiety earlier and feels better now. She has not eaten breakfast. Nausea is better.     Review of Systems  Objective:     Vital Signs (Most Recent):  Temp: 97.7 °F (36.5 °C) (09/20/24 1122)  Pulse: 97 (09/20/24 1122)  Resp: 18 (09/20/24 1122)  BP: (!) 138/91 (09/20/24 1122)  SpO2: 100 % (09/20/24 1122) Vital Signs (24h Range):  Temp:  [97.1 °F (36.2 °C)-98.5 °F (36.9 °C)] 97.7 °F (36.5 °C)  Pulse:  [] 97  Resp:  [16-22] 18  SpO2:  [98 %-100 %] 100 %  BP: ()/() 138/91     Weight: 47.5 kg (104 lb 11.5 oz)  Body mass index is 19.15 kg/m².    Intake/Output Summary (Last 24 hours) at 9/20/2024 1152  Last data filed at 9/20/2024 0540  Gross per 24 hour   Intake 550 ml   Output 1150 ml   Net -600 ml         Physical Exam  Vitals reviewed.   Constitutional:       Appearance: She is ill-appearing.      Comments: Chronically ill appearing   HENT:      Head: Normocephalic and atraumatic.      Nose: Nose normal.      Mouth/Throat:      Mouth: Mucous membranes are dry.      Pharynx: Oropharynx is clear.   Eyes:      Conjunctiva/sclera:      Left eye: Hemorrhage present.   Cardiovascular:      Rate and Rhythm: Regular rhythm.      Pulses:           Radial pulses are 2+ on the right side and 2+ on the left side.        Dorsalis pedis pulses are 2+ on the right side and 2+ on the left side.      Arteriovenous access: Left arteriovenous access is present.  Pulmonary:      Effort: Pulmonary effort is normal.      Breath sounds: Normal breath sounds.   Abdominal:      General: Bowel sounds are normal.      Palpations: Abdomen is soft.      Tenderness: There is generalized abdominal tenderness.   Musculoskeletal:         General: Normal range of motion.      Cervical back: Normal range of motion and neck supple.      Right lower leg: No edema.      Left lower leg: No edema.   Skin:     General: Skin is warm and dry.      Capillary Refill:  Capillary refill takes less than 2 seconds.   Neurological:      General: No focal deficit present.      Mental Status: She is alert and oriented to person, place, and time. Mental status is at baseline.   Psychiatric:         Attention and Perception: Attention normal.         Behavior: Behavior is withdrawn       Significant Labs: All pertinent labs within the past 24 hours have been reviewed.  CBC:   Recent Labs   Lab 09/18/24 2119 09/19/24  1025 09/20/24  0415   WBC 6.83 8.46 7.09   HGB 10.0* 11.0* 9.5*   HCT 29.1* 32.5* 28.8*    197 138*     CMP:   Recent Labs   Lab 09/18/24 2119 09/19/24  1025 09/20/24  0415    141 140   K 3.8 4.1 3.8   CL 96 96 99   CO2 24 20* 22*   * 196* 148*   BUN 23* 26* 10   CREATININE 5.6* 6.6* 3.9*   CALCIUM 9.2 10.0 9.7   PROT 7.3 8.2 7.1   ALBUMIN 3.5 4.0 3.4*   BILITOT 1.3* 1.9* 1.1*   ALKPHOS 99 102 91   AST 13 16 18   ALT 8* 10 9*   ANIONGAP 21* 25* 19*       Significant Imaging: I have reviewed all pertinent imaging results/findings within the past 24 hours.    Assessment/Plan:      * High anion gap metabolic acidosis  beta hydroxybutyrate elevated   Patient is slightly acidotic, but euglycemic  - start IVF   - rpt Beta hydroxybutyrate this evening, if still elevated, will need to start on DKA protocol       Chronic abdominal pain  Abdominal pain and nausea controlled with ativan and phenergan.       ESRD (end stage renal disease) on dialysis  Creatine stable for now. BMP reviewed- noted Estimated Creatinine Clearance: 15.1 mL/min (A) (based on SCr of 3.9 mg/dL (H)). according to latest data. Based on current GFR, CKD stage is end stage.  Monitor UOP and serial BMP and adjust therapy as needed. Renally dose meds. Avoid nephrotoxic medications and procedures.    Last full dialysis session on Monday 9/16/24. Patient normally Tue/Thur/Sat. Missed appointment today due to abdominal pain  Dr. Figueroa placed orders for dialysis   Daily cbc, cmp, mag,  phos    Diabetes  Patient's FSGs are uncontrolled due to hyperglycemia on current medication regimen.  Last A1c reviewed-   Lab Results   Component Value Date    HGBA1C 9.0 (H) 09/10/2024     Most recent fingerstick glucose reviewed-   Recent Labs   Lab 09/19/24  1827 09/20/24  0040 09/20/24  0433 09/20/24  0745   POCTGLUCOSE 304* 107 162* 187*       Current correctional scale  Low  Decrease anti-hyperglycemic dose as follows-   Antihyperglycemics (From admission, onward)      Start     Stop Route Frequency Ordered    09/19/24 2100  insulin glargine U-100 (Lantus) pen 20 Units         -- SubQ Nightly 09/19/24 1751    09/19/24 1732  insulin aspart U-100 pen 0-5 Units         -- SubQ Before meals & nightly PRN 09/19/24 1632          Hold Oral hypoglycemics while patient is in the hospital.    Seizures  -Seizure precautions  -Keppra continued BID      Hypertension  Chronic, controlled. Latest blood pressure and vitals reviewed-     Home meds for hypertension were reviewed and noted below.   Hypertension Medications               hydrALAZINE (APRESOLINE) 50 MG tablet Take 1 tablet (50 mg total) by mouth every 8 (eight) hours.            While in the hospital, will manage blood pressure as follows; Continue home antihypertensive regimen    Will utilize p.r.n. blood pressure medication only if patient's blood pressure greater than 160/100 and she develops symptoms such as worsening chest pain or shortness of breath.      VTE Risk Mitigation (From admission, onward)           Ordered     apixaban tablet 5 mg  2 times daily         09/19/24 1751     IP VTE HIGH RISK PATIENT  Once         09/19/24 1624     Place sequential compression device  Until discontinued         09/19/24 1624                    Discharge Planning   TATIANA: 9/20/2024     Code Status: Full Code   Is the patient medically ready for discharge?:     Reason for patient still in hospital (select all that apply): Treatment                     Mohini  MD Madelin  Department of Hospital Medicine   [facility]/Surg

## 2024-09-20 NOTE — ASSESSMENT & PLAN NOTE
Creatine stable for now. BMP reviewed- noted Estimated Creatinine Clearance: 15.1 mL/min (A) (based on SCr of 3.9 mg/dL (H)). according to latest data. Based on current GFR, CKD stage is end stage.  Monitor UOP and serial BMP and adjust therapy as needed. Renally dose meds. Avoid nephrotoxic medications and procedures.    Last full dialysis session on Monday 9/16/24. Patient normally Tue/Thur/Sat. Missed appointment today due to abdominal pain  Dr. Figueroa placed orders for dialysis   Daily cbc, cmp, mag, phos

## 2024-09-20 NOTE — H&P
[facility] Medicine  History & Physical    Patient Name: Tabby Howard  MRN: [id number]  Patient Class: OP- Observation  Admission Date: 9/19/2024  Attending Physician: Mohini Yusuf MD   Primary Care Provider: Melony Kim NP         Patient information was obtained from patient, past medical records, and ER records.     Subjective:     Principal Problem:<principal problem not specified>    Chief Complaint:   Chief Complaint   Patient presents with    Flank Pain     Left side flank pain, N&V starting this morning        HPI: Tabby Howard is a 34 year old female with a previous medical history of ESRD on dialysis Tue/Thur/Sat, chronic abdominal pain, HTN, Type II Diabetes, who presented to the ED  for left-sided flank pain. HPI is limited to chart review and ED note due to patient not being cooperative.  Per chart review, patient has multiple similar episodes in the past and has had multiple previous CT scans performed.  Also has had multiple hospitalizations for DKA in the past. Patient reports last dialysis was Monday 9/9/24 due to them scheduling her that day. She was unable to attend her normal Thursday session due to severe abdominal pain and vomiting. Of note she has had 5 ER visits over the past five days and most recent admission was on 9/10/24. She was admitted for DKA and insulin drip discontinued the next day. She received dialysis along with an EGD that showed mild gastritis. Initial ED evaluation showed elevated anion gap and betahydroxybutyrate but low normal PH. Dr. Bradford consulted and he placed orders for dialysis. Pain and nausea controlled with ativan and phenergan. Patient admitted by hospital medicine for further evaluation and management.     Past Medical History:   Diagnosis Date    ESRD on hemodialysis 04/12/2022    Gastritis 12/2022    EGD was 12/5/22    Gastroparesis 04/12/2022    has not had Emptying study    Heart failure with preserved  ejection fraction 2022    EF 55% on 3/22    History of supraventricular tachycardia     Hyperkalemia 2022    Hypertensive emergency 2022    Sickle cell trait 2022    Type 2 diabetes mellitus        Past Surgical History:   Procedure Laterality Date     SECTION      x 3    COLONOSCOPY      COLONOSCOPY N/A 2022    Procedure: COLONOSCOPY;  Surgeon: Jagdeep Cedeno MD;  Location: [facility];  Service: Endoscopy;  Laterality: N/A;    DESTRUCTION, CILIARY BODY, USING LASER Left 2024    Procedure: Cyclophotocoagulation G-probe, retrobulbar chlorpromazine left eye;  Surgeon: Fidel Wise MD;  Location: [address];  Service: Ophthalmology;  Laterality: Left;    ENDOSCOPIC ULTRASOUND OF UPPER GASTROINTESTINAL TRACT N/A 2023    Procedure: ULTRASOUND, UPPER GI TRACT, ENDOSCOPIC;  Surgeon: Micky Paredes III, MD;  Location: [facility];  Service: Endoscopy;  Laterality: N/A;    ESOPHAGOGASTRODUODENOSCOPY N/A 10/18/2019    Procedure: ESOPHAGOGASTRODUODENOSCOPY (EGD);  Surgeon: Gianluca Mendez MD;  Location: [facility];  Service: Endoscopy;  Laterality: N/A;    ESOPHAGOGASTRODUODENOSCOPY N/A 2022    Procedure: EGD (ESOPHAGOGASTRODUODENOSCOPY);  Surgeon: Micky Paredes III, MD;  Location: [facility];  Service: Endoscopy;  Laterality: N/A;    ESOPHAGOGASTRODUODENOSCOPY N/A 2022    Procedure: EGD (ESOPHAGOGASTRODUODENOSCOPY);  Surgeon: Marcelo Zhong MD;  Location: [facility];  Service: Endoscopy;  Laterality: N/A;    ESOPHAGOGASTRODUODENOSCOPY N/A 2024    Procedure: EGD (ESOPHAGOGASTRODUODENOSCOPY);  Surgeon: Marcelo Zhong MD;  Location: [facility];  Service: Endoscopy;  Laterality: N/A;    EYE SURGERY      INSERTION, CATHETER, TUNNELED N/A 2023    Procedure: Insertion,catheter,tunneled;  Surgeon: Carlos Thurman Jr., MD;  Location: [facility] OR;  Service: General;  Laterality: N/A;    LAPAROSCOPIC CHOLECYSTECTOMY N/A 2022     "Procedure: CHOLECYSTECTOMY, LAPAROSCOPIC;  Surgeon: Grey Perez MD;  Location: [facility] OR;  Service: General;  Laterality: N/A;    PLACEMENT OF DUAL-LUMEN VASCULAR CATHETER Left 07/12/2022    Procedure: INSERTION-CATHETER-JOSEPH;  Surgeon: Dionte Gan MD;  Location: [facility] OR;  Service: General;  Laterality: Left;    PLACEMENT OF DUAL-LUMEN VASCULAR CATHETER Right 07/26/2022    Procedure: INSERTION-CATHETER-Hemosplit;  Surgeon: Dionte Gan MD;  Location: [facility] OR;  Service: General;  Laterality: Right;       Review of patient's allergies indicates:   Allergen Reactions    Droperidol Hives    Haldol [haloperidol lactate] Hives    Penicillins Hives    Droperidol (bulk) Anxiety     STATES "FREAKS OUT".  TOLERATES HALDOL PRIOR TO ARRIVAL AT ANOTHER FACILITY TODAY.        Current Facility-Administered Medications on File Prior to Encounter   Medication    [COMPLETED] insulin regular injection 7 Units 0.07 mL     Current Outpatient Medications on File Prior to Encounter   Medication Sig    albuterol (VENTOLIN HFA) 90 mcg/actuation inhaler Inhale 2 puffs every 4 hours by inhalation route.    apixaban (ELIQUIS) 5 mg Tab Take 1 tablet (5 mg total) by mouth 2 (two) times daily. Patient w/ thrombocytopenia <50, so held during admission, follow-up with hematologist about restarting    brinzolamide (AZOPT) 1 % ophthalmic suspension Place1 drop in left eye 3 times a day for 30 days    cetirizine (ZYRTEC) 10 MG tablet Take 1 tablet every day by oral route.    FLUoxetine 40 MG capsule Take 1 capsule every day by oral route.    fluticasone propionate (FLONASE ALLERGY RELIEF) 50 mcg/actuation nasal spray [place] 1 spray every day by intranasal route.    gabapentin (NEURONTIN) 300 MG capsule Take 2 capsules twice a day by oral route for 90 days.    hydrALAZINE (APRESOLINE) 50 MG tablet Take 1 tablet (50 mg total) by mouth every 8 (eight) hours.    insulin aspart U-100 (NOVOLOG) 100 unit/mL (3 mL) InPn pen Inject 8 Units into the " skin 3 (three) times daily with meals.    insulin glargine U-100, Lantus, (LANTUS SOLOSTAR U-100 INSULIN) 100 unit/mL (3 mL) InPn pen Inject 22 units every day by subcutaneous route in the evening for 30 days, for diabetes.    levETIRAcetam (KEPPRA) 500 MG Tab Take 1 tablet (500 mg total) by mouth 2 (two) times daily.    ondansetron (ZOFRAN-ODT) 4 MG TbDL Place 1 tablet (4 mg) on or under the tongue every 8 hours for 10 days.    pantoprazole (PROTONIX) 40 MG tablet Take 1 tablet (40 mg total) by mouth once daily.    prednisoLONE acetate (PRED FORTE) 1 % DrpS Place 1 drop into the left eye 2 (two) times daily.    promethazine (PHENERGAN) 25 MG tablet Take 1 tablet (25 mg total) by mouth every 6 (six) hours as needed.    sevelamer carbonate (RENVELA) 800 mg Tab Take 1 tablet (800 mg total) by mouth 3 (three) times daily with meals.    sucralfate (CARAFATE) 1 gram tablet Take 1 tablet (1 g total) by mouth 4 (four) times daily.    atropine 1% (ISOPTO ATROPINE) 1 % Drop Place 1 drop into the left eye 2 (two) times a day.    blood sugar diagnostic (TRUE METRIX GLUCOSE TEST STRIP) Strp Use 1 strip to check blood glucose three times daily    blood-glucose meter (TRUE METRIX GLUCOSE METER) Misc Use to check blood glucose    blood-glucose meter,continuous Misc USE WITH SENSORS    blood-glucose sensor (DEXCOM G7 SENSOR) Heather Use as directed for CGM. Change sensor every 10 days    blood-glucose sensor Heather CHANGE EVERY 10 DAYS    brimonidine 0.15 % OPTH DROP (ALPHAGAN) 0.15 % ophthalmic solution Place 1 drop into both eyes 3 (three) times daily.    busPIRone (BUSPAR) 10 MG tablet Take 1 tablet (10 mg total) by mouth 3 (three) times daily as needed (anxiety).    dorzolamide-timolol 2-0.5% (COSOPT) 22.3-6.8 mg/mL ophthalmic solution place 1 drop in left eye twice a day  for 30 days    lancets (TRUEPLUS LANCETS) 33 gauge Misc Use 1 to check blood glucose three times daily    oxyCODONE-acetaminophen (PERCOCET)  mg per tablet  "Take 1 tablet by mouth every 4 (four) hours as needed for Pain.    pen needle, diabetic 31 gauge x 3/16" Ndle Use as directed with insulin once daily    timolol maleate 0.5% (TIMOPTIC) 0.5 % Drop Place 1 drop in left eye 2 times a day for 30 days    [DISCONTINUED] atenoloL (TENORMIN) 50 MG tablet Take 1 tablet (50 mg total) by mouth every other day.    [DISCONTINUED] insulin detemir U-100, Levemir, (LEVEMIR FLEXTOUCH U100 INSULIN) 100 unit/mL (3 mL) InPn pen Inject 22 units every day by subcutaneous route in the evening for 90 days.    [DISCONTINUED] omeprazole (PRILOSEC) 20 MG capsule Take 2 capsules (40 mg total) by mouth once daily. for 10 days     Family History       Problem Relation (Age of Onset)    Diabetes Mother, Father          Tobacco Use    Smoking status: Never    Smokeless tobacco: Never   Substance and Sexual Activity    Alcohol use: Not Currently    Drug use: No    Sexual activity: Not Currently     Partners: Male     Birth control/protection: I.U.D.     Review of Systems   Gastrointestinal:  Positive for abdominal pain and nausea.   Musculoskeletal:  Positive for back pain.   All other systems reviewed and are negative.    Objective:     Vital Signs (Most Recent):  Temp: 97.1 °F (36.2 °C) (09/19/24 1914)  Pulse: 89 (09/19/24 2200)  Resp: 18 (09/19/24 1925)  BP: 94/62 (09/19/24 2200)  SpO2: 99 % (09/19/24 1925) Vital Signs (24h Range):  Temp:  [97.1 °F (36.2 °C)-98.4 °F (36.9 °C)] 97.1 °F (36.2 °C)  Pulse:  [] 89  Resp:  [15-22] 18  SpO2:  [98 %-100 %] 99 %  BP: ()/() 94/62     Weight: 47.5 kg (104 lb 11.5 oz)  Body mass index is 19.15 kg/m².     Physical Exam  Vitals reviewed.   Constitutional:       Appearance: She is ill-appearing.      Comments: Chronically ill appearing   HENT:      Head: Normocephalic and atraumatic.      Nose: Nose normal.      Mouth/Throat:      Mouth: Mucous membranes are dry.      Pharynx: Oropharynx is clear.   Eyes:      Conjunctiva/sclera:      Left " eye: Hemorrhage present.      Pupils: Pupils are equal, round, and reactive to light.      Comments: Right eye   Cardiovascular:      Rate and Rhythm: Regular rhythm. Tachycardia present.      Pulses:           Radial pulses are 2+ on the right side and 2+ on the left side.        Dorsalis pedis pulses are 2+ on the right side and 2+ on the left side.      Arteriovenous access: Left arteriovenous access is present.  Pulmonary:      Effort: Pulmonary effort is normal.      Breath sounds: Normal breath sounds.   Abdominal:      General: Bowel sounds are normal.      Palpations: Abdomen is soft.      Tenderness: There is generalized abdominal tenderness.          Comments: Bruising noted   Musculoskeletal:         General: Normal range of motion.      Cervical back: Normal range of motion and neck supple.      Right lower leg: No edema.      Left lower leg: No edema.   Skin:     General: Skin is warm and dry.      Capillary Refill: Capillary refill takes less than 2 seconds.   Neurological:      General: No focal deficit present.      Mental Status: She is alert and oriented to person, place, and time. Mental status is at baseline.   Psychiatric:         Attention and Perception: Attention normal.         Speech: Speech is rapid and pressured.         Behavior: Behavior is agitated.      Comments: Agitated and writhing in bed               CRANIAL NERVES     CN III, IV, VI   Pupils are equal, round, and reactive to light.       Significant Labs: All pertinent labs within the past 24 hours have been reviewed.  A1C:   Recent Labs   Lab 06/23/24  0712 09/10/24  2106   HGBA1C 8.5* 9.0*     ABGs:   Recent Labs   Lab 09/19/24  1448   PH 7.366   PCO2 48.8*   HCO3 27.9   POCSATURATED 64   BE 3*   PO2 35*     Bilirubin:   Recent Labs   Lab 09/14/24  2309 09/15/24  0917 09/17/24  1318 09/18/24  2119 09/19/24  1025   BILITOT 1.0 1.6* 1.0 1.3* 1.9*     BMP:   Recent Labs   Lab 09/19/24  1025   *      K 4.1   CL 96    CO2 20*   BUN 26*   CREATININE 6.6*   CALCIUM 10.0   MG 1.9     CBC:   Recent Labs   Lab 09/18/24 2119 09/19/24  1025   WBC 6.83 8.46   HGB 10.0* 11.0*   HCT 29.1* 32.5*    197     CMP:   Recent Labs   Lab 09/18/24 2119 09/19/24  1025    141   K 3.8 4.1   CL 96 96   CO2 24 20*   * 196*   BUN 23* 26*   CREATININE 5.6* 6.6*   CALCIUM 9.2 10.0   PROT 7.3 8.2   ALBUMIN 3.5 4.0   BILITOT 1.3* 1.9*   ALKPHOS 99 102   AST 13 16   ALT 8* 10   ANIONGAP 21* 25*     Magnesium:   Recent Labs   Lab 09/19/24  1025   MG 1.9     POCT Glucose:   Recent Labs   Lab 09/18/24 2256 09/19/24  1019 09/19/24  1827   POCTGLUCOSE 387* 198* 304*     TSH:   Recent Labs   Lab 09/14/24  2309   TSH 1.618     Beta-hydroxybutyrate: 3.4        Significant Imaging: I have reviewed all pertinent imaging results/findings within the past 24 hours.  No imaging performed in last 24 hours  Assessment/Plan:     Chronic abdominal pain  Abdominal pain and nausea controlled with ativan and phenergan.       ESRD (end stage renal disease) on dialysis  Creatine stable for now. BMP reviewed- noted Estimated Creatinine Clearance: 9.4 mL/min (A) (based on SCr of 6.6 mg/dL (H)). according to latest data. Based on current GFR, CKD stage is end stage.  Monitor UOP and serial BMP and adjust therapy as needed. Renally dose meds. Avoid nephrotoxic medications and procedures.    Last full dialysis session on Monday 9/16/24. Patient normally Tue/Thur/Sat. Missed appointment today due to abdominal pain  Dr. Figueroa placed orders for dialysis upon admit  Daily cbc, cmp, mag, phos    Diabetes  Patient's FSGs are uncontrolled due to hyperglycemia on current medication regimen.  Last A1c reviewed-   Lab Results   Component Value Date    HGBA1C 9.0 (H) 09/10/2024     Most recent fingerstick glucose reviewed-   Recent Labs   Lab 09/18/24 2101 09/18/24 2256 09/19/24  1019   POCTGLUCOSE 363* 387* 198*     Current correctional scale  Low  Decrease  anti-hyperglycemic dose as follows-   Antihyperglycemics (From admission, onward)      Start     Stop Route Frequency Ordered    09/19/24 2100  insulin glargine U-100 (Lantus) pen 20 Units         -- SubQ Nightly 09/19/24 1751    09/19/24 1732  insulin aspart U-100 pen 0-5 Units         -- SubQ Before meals & nightly PRN 09/19/24 1632          Hold Oral hypoglycemics while patient is in the hospital.    Seizures  -Seizure precautions  -Keppra continued BID      Hypertension  Chronic, controlled. Latest blood pressure and vitals reviewed-     Temp:  [97.9 °F (36.6 °C)-98.4 °F (36.9 °C)]   Pulse:  []   Resp:  [15-22]   BP: (113-144)/(38-97)   SpO2:  [98 %-100 %] .   Home meds for hypertension were reviewed and noted below.   Hypertension Medications               hydrALAZINE (APRESOLINE) 50 MG tablet Take 1 tablet (50 mg total) by mouth every 8 (eight) hours.            While in the hospital, will manage blood pressure as follows; Continue home antihypertensive regimen    Will utilize p.r.n. blood pressure medication only if patient's blood pressure greater than 160/100 and she develops symptoms such as worsening chest pain or shortness of breath.    High anion gap metabolic acidosis  Likely secondary to Dialysis.        Daily CBC, CMP, Mag, Phos,   Repeat beta hydroxybutyrate in AM         VTE Risk Mitigation (From admission, onward)           Ordered     apixaban tablet 5 mg  2 times daily         09/19/24 1751     IP VTE HIGH RISK PATIENT  Once         09/19/24 1624     Place sequential compression device  Until discontinued         09/19/24 1624                         On 09/19/2024, patient should be placed in hospital observation services under my care in collaboration with Dr. Mohini Yusuf MD.           Crissy Michaels NP  Department of Hospital Medicine  [facility]/Surg

## 2024-09-21 LAB
ALBUMIN SERPL BCP-MCNC: 3.1 G/DL (ref 3.5–5.2)
ALP SERPL-CCNC: 79 U/L (ref 55–135)
ALT SERPL W/O P-5'-P-CCNC: 9 U/L (ref 10–44)
ANION GAP SERPL CALC-SCNC: 12 MMOL/L (ref 8–16)
AST SERPL-CCNC: 14 U/L (ref 10–40)
BILIRUB SERPL-MCNC: 0.9 MG/DL (ref 0.1–1)
BUN SERPL-MCNC: 24 MG/DL (ref 6–20)
CALCIUM SERPL-MCNC: 9.5 MG/DL (ref 8.7–10.5)
CHLORIDE SERPL-SCNC: 103 MMOL/L (ref 95–110)
CO2 SERPL-SCNC: 25 MMOL/L (ref 23–29)
CREAT SERPL-MCNC: 6.4 MG/DL (ref 0.5–1.4)
ERYTHROCYTE [DISTWIDTH] IN BLOOD BY AUTOMATED COUNT: 18 % (ref 11.5–14.5)
EST. GFR  (NO RACE VARIABLE): 8 ML/MIN/1.73 M^2
GLUCOSE SERPL-MCNC: 101 MG/DL (ref 70–110)
HCT VFR BLD AUTO: 29.6 % (ref 37–48.5)
HGB BLD-MCNC: 9.9 G/DL (ref 12–16)
MAGNESIUM SERPL-MCNC: 2.1 MG/DL (ref 1.6–2.6)
MCH RBC QN AUTO: 28.8 PG (ref 27–31)
MCHC RBC AUTO-ENTMCNC: 33.4 G/DL (ref 32–36)
MCV RBC AUTO: 86 FL (ref 82–98)
OHS QRS DURATION: 80 MS
OHS QTC CALCULATION: 500 MS
PHOSPHATE SERPL-MCNC: 4.1 MG/DL (ref 2.7–4.5)
PLATELET # BLD AUTO: 166 K/UL (ref 150–450)
PMV BLD AUTO: 10.2 FL (ref 9.2–12.9)
POCT GLUCOSE: 131 MG/DL (ref 70–110)
POCT GLUCOSE: 264 MG/DL (ref 70–110)
POCT GLUCOSE: 372 MG/DL (ref 70–110)
POCT GLUCOSE: 439 MG/DL (ref 70–110)
POCT GLUCOSE: 478 MG/DL (ref 70–110)
POCT GLUCOSE: 494 MG/DL (ref 70–110)
POCT GLUCOSE: 80 MG/DL (ref 70–110)
POTASSIUM SERPL-SCNC: 4.1 MMOL/L (ref 3.5–5.1)
PROT SERPL-MCNC: 6.7 G/DL (ref 6–8.4)
RBC # BLD AUTO: 3.44 M/UL (ref 4–5.4)
SODIUM SERPL-SCNC: 140 MMOL/L (ref 136–145)
WBC # BLD AUTO: 5.96 K/UL (ref 3.9–12.7)

## 2024-09-21 PROCEDURE — 36415 COLL VENOUS BLD VENIPUNCTURE: CPT

## 2024-09-21 PROCEDURE — 63600175 PHARM REV CODE 636 W HCPCS: Mod: JG | Performed by: INTERNAL MEDICINE

## 2024-09-21 PROCEDURE — 99900035 HC TECH TIME PER 15 MIN (STAT)

## 2024-09-21 PROCEDURE — 96372 THER/PROPH/DIAG INJ SC/IM: CPT | Performed by: NURSE PRACTITIONER

## 2024-09-21 PROCEDURE — 25000003 PHARM REV CODE 250

## 2024-09-21 PROCEDURE — 80053 COMPREHEN METABOLIC PANEL: CPT

## 2024-09-21 PROCEDURE — 63600175 PHARM REV CODE 636 W HCPCS

## 2024-09-21 PROCEDURE — 90935 HEMODIALYSIS ONE EVALUATION: CPT

## 2024-09-21 PROCEDURE — 63600175 PHARM REV CODE 636 W HCPCS: Performed by: NURSE PRACTITIONER

## 2024-09-21 PROCEDURE — 85027 COMPLETE CBC AUTOMATED: CPT

## 2024-09-21 PROCEDURE — G0378 HOSPITAL OBSERVATION PER HR: HCPCS

## 2024-09-21 PROCEDURE — 83735 ASSAY OF MAGNESIUM: CPT

## 2024-09-21 PROCEDURE — 11000001 HC ACUTE MED/SURG PRIVATE ROOM

## 2024-09-21 PROCEDURE — 84100 ASSAY OF PHOSPHORUS: CPT

## 2024-09-21 PROCEDURE — 25000003 PHARM REV CODE 250: Performed by: INTERNAL MEDICINE

## 2024-09-21 PROCEDURE — 94761 N-INVAS EAR/PLS OXIMETRY MLT: CPT

## 2024-09-21 RX ORDER — INSULIN ASPART 100 [IU]/ML
7 INJECTION, SOLUTION INTRAVENOUS; SUBCUTANEOUS ONCE
Status: COMPLETED | OUTPATIENT
Start: 2024-09-22 | End: 2024-09-21

## 2024-09-21 RX ADMIN — INSULIN ASPART 3 UNITS: 100 INJECTION, SOLUTION INTRAVENOUS; SUBCUTANEOUS at 09:09

## 2024-09-21 RX ADMIN — SUCRALFATE 1 G: 1 TABLET ORAL at 09:09

## 2024-09-21 RX ADMIN — FLUOXETINE HYDROCHLORIDE 40 MG: 20 CAPSULE ORAL at 08:09

## 2024-09-21 RX ADMIN — LEVETIRACETAM 500 MG: 250 TABLET, FILM COATED ORAL at 09:09

## 2024-09-21 RX ADMIN — SUCRALFATE 1 G: 1 TABLET ORAL at 08:09

## 2024-09-21 RX ADMIN — SUCRALFATE 1 G: 1 TABLET ORAL at 02:09

## 2024-09-21 RX ADMIN — PREDNISOLONE ACETATE 1 DROP: 10 SUSPENSION/ DROPS OPHTHALMIC at 11:09

## 2024-09-21 RX ADMIN — APIXABAN 5 MG: 2.5 TABLET, FILM COATED ORAL at 08:09

## 2024-09-21 RX ADMIN — HYDROCODONE BITARTRATE AND ACETAMINOPHEN 1 TABLET: 5; 325 TABLET ORAL at 08:09

## 2024-09-21 RX ADMIN — INSULIN ASPART 7 UNITS: 100 INJECTION, SOLUTION INTRAVENOUS; SUBCUTANEOUS at 11:09

## 2024-09-21 RX ADMIN — APIXABAN 5 MG: 2.5 TABLET, FILM COATED ORAL at 09:09

## 2024-09-21 RX ADMIN — SEVELAMER CARBONATE 800 MG: 800 TABLET, FILM COATED ORAL at 05:09

## 2024-09-21 RX ADMIN — SUCRALFATE 1 G: 1 TABLET ORAL at 05:09

## 2024-09-21 RX ADMIN — LORAZEPAM 1 MG: 2 INJECTION INTRAMUSCULAR; INTRAVENOUS at 12:09

## 2024-09-21 RX ADMIN — LORAZEPAM 1 MG: 2 INJECTION INTRAMUSCULAR; INTRAVENOUS at 09:09

## 2024-09-21 RX ADMIN — LORAZEPAM 1 MG: 2 INJECTION INTRAMUSCULAR; INTRAVENOUS at 06:09

## 2024-09-21 RX ADMIN — INSULIN ASPART 3 UNITS: 100 INJECTION, SOLUTION INTRAVENOUS; SUBCUTANEOUS at 05:09

## 2024-09-21 RX ADMIN — SEVELAMER CARBONATE 800 MG: 800 TABLET, FILM COATED ORAL at 08:09

## 2024-09-21 RX ADMIN — EPOETIN ALFA-EPBX 2360 UNITS: 4000 INJECTION, SOLUTION INTRAVENOUS; SUBCUTANEOUS at 10:09

## 2024-09-21 RX ADMIN — INSULIN GLARGINE 20 UNITS: 100 INJECTION, SOLUTION SUBCUTANEOUS at 09:09

## 2024-09-21 RX ADMIN — LEVETIRACETAM 500 MG: 250 TABLET, FILM COATED ORAL at 08:09

## 2024-09-21 RX ADMIN — PANTOPRAZOLE SODIUM 40 MG: 40 TABLET, DELAYED RELEASE ORAL at 08:09

## 2024-09-21 RX ADMIN — HYDROCODONE BITARTRATE AND ACETAMINOPHEN 1 TABLET: 5; 325 TABLET ORAL at 05:09

## 2024-09-21 RX ADMIN — PREDNISOLONE ACETATE 1 DROP: 10 SUSPENSION/ DROPS OPHTHALMIC at 08:09

## 2024-09-21 NOTE — PROGRESS NOTES
INPATIENT NEPHROLOGY progress  [facility] NEPHROLOGY    Tabby Howard  09/21/2024    Reason for consultation:    esrd    Chief Complaint:   Chief Complaint   Patient presents with    Flank Pain     Left side flank pain, N&V starting this morning          History of Present Illness:    Per H and P  Tabby Howard is a 34 year old female with a previous medical history of ESRD on dialysis Tue/Thur/Sat, chronic abdominal pain, HTN, Type II Diabetes, who presented to the ED  for left-sided flank pain. HPI is limited to chart review and ED note due to patient not being cooperative.  Per chart review, patient has multiple similar episodes in the past and has had multiple previous CT scans performed.  Also has had multiple hospitalizations for DKA in the past. Patient reports last dialysis was Monday 9/9/24 due to them scheduling her that day. She was unable to attend her normal Thursday session due to severe abdominal pain and vomiting. Of note she has had 5 ER visits over the past five days and most recent admission was on 9/10/24. She was admitted for DKA and insulin drip discontinued the next day. She received dialysis along with an EGD that showed mild gastritis. Initial ED evaluation showed elevated anion gap and betahydroxybutyrate but low normal PH.      9/21  pressures low, but stable.  Lab results reviewed.  Seen today on dialysis.  Has hypotension, but alert and oriented.      Plan of Care:       Assessment:    esrd  --continue dialysis per routine  --fluid restrict  --renal dose medication per routine  --continue outpt medication  --continue binders with meals  --650 cc ultrafiltration last pm  --pt with 17 hospital admissions in last 12 months.   Perhaps a psychiatric evaluation is in order    Anemia  --erythropoiesis stimulating agent with renal replacement therapy    Hyperphosphatemia  --renal diet  --continue binders    Hypertension  --uf with hd  --fluid restrict  --low salt diet  --continue home  medication    DKA  --as per primary           Thank you for allowing us to participate in this patient's care. We will continue to follow.    Vital Signs:  Temp Readings from Last 3 Encounters:   24 98 °F (36.7 °C) (Axillary)   24 98.4 °F (36.9 °C) (Oral)   24 98 °F (36.7 °C)       Pulse Readings from Last 3 Encounters:   24 88   24 94   24 92       BP Readings from Last 3 Encounters:   24 97/66   24 122/75   24 (!) 180/99       Weight:  Wt Readings from Last 3 Encounters:   24 47.5 kg (104 lb 11.5 oz)   24 52.6 kg (116 lb)   24 52.6 kg (116 lb)       Past Medical & Surgical History:  Past Medical History:   Diagnosis Date    ESRD on hemodialysis 2022    Gastritis 2022    EGD was 22    Gastroparesis 2022    has not had Emptying study    Heart failure with preserved ejection fraction 2022    EF 55% on 3/22    History of supraventricular tachycardia     Hyperkalemia 2022    Hypertensive emergency 2022    Sickle cell trait 2022    Type 2 diabetes mellitus        Past Surgical History:   Procedure Laterality Date     SECTION      x 3    COLONOSCOPY      COLONOSCOPY N/A 2022    Procedure: COLONOSCOPY;  Surgeon: Jagdeep Cedeno MD;  Location: [facility];  Service: Endoscopy;  Laterality: N/A;    DESTRUCTION, CILIARY BODY, USING LASER Left 2024    Procedure: Cyclophotocoagulation G-probe, retrobulbar chlorpromazine left eye;  Surgeon: Fidel Wise MD;  Location: [address];  Service: Ophthalmology;  Laterality: Left;    ENDOSCOPIC ULTRASOUND OF UPPER GASTROINTESTINAL TRACT N/A 2023    Procedure: ULTRASOUND, UPPER GI TRACT, ENDOSCOPIC;  Surgeon: Micky Paredes III, MD;  Location: [facility];  Service: Endoscopy;  Laterality: N/A;    ESOPHAGOGASTRODUODENOSCOPY N/A 10/18/2019    Procedure: ESOPHAGOGASTRODUODENOSCOPY (EGD);  Surgeon: Gianluca Mendez MD;  Location: [facility];   Service: Endoscopy;  Laterality: N/A;    ESOPHAGOGASTRODUODENOSCOPY N/A 08/24/2022    Procedure: EGD (ESOPHAGOGASTRODUODENOSCOPY);  Surgeon: Micky Paredes III, MD;  Location: [facility] ENDO;  Service: Endoscopy;  Laterality: N/A;    ESOPHAGOGASTRODUODENOSCOPY N/A 12/05/2022    Procedure: EGD (ESOPHAGOGASTRODUODENOSCOPY);  Surgeon: Marcelo Zhong MD;  Location: [facility];  Service: Endoscopy;  Laterality: N/A;    ESOPHAGOGASTRODUODENOSCOPY N/A 9/13/2024    Procedure: EGD (ESOPHAGOGASTRODUODENOSCOPY);  Surgeon: Marcelo Zhong MD;  Location: [facility];  Service: Endoscopy;  Laterality: N/A;    EYE SURGERY      INSERTION, CATHETER, TUNNELED N/A 06/17/2023    Procedure: Insertion,catheter,tunneled;  Surgeon: Carlos Thurman Jr., MD;  Location: [facility];  Service: General;  Laterality: N/A;    LAPAROSCOPIC CHOLECYSTECTOMY N/A 07/30/2022    Procedure: CHOLECYSTECTOMY, LAPAROSCOPIC;  Surgeon: Grey Perez MD;  Location: [facility] OR;  Service: General;  Laterality: N/A;    PLACEMENT OF DUAL-LUMEN VASCULAR CATHETER Left 07/12/2022    Procedure: INSERTION-CATHETER-JOSEPH;  Surgeon: Dionte Gan MD;  Location: [facility] OR;  Service: General;  Laterality: Left;    PLACEMENT OF DUAL-LUMEN VASCULAR CATHETER Right 07/26/2022    Procedure: INSERTION-CATHETER-Hemosplit;  Surgeon: Dionet Gan MD;  Location: [facility] OR;  Service: General;  Laterality: Right;       Past Social History:  Social History     Socioeconomic History    Marital status:    Tobacco Use    Smoking status: Never    Smokeless tobacco: Never   Substance and Sexual Activity    Alcohol use: Not Currently    Drug use: No    Sexual activity: Not Currently     Partners: Male     Birth control/protection: I.U.D.     Social Determinants of Health     Financial Resource Strain: Low Risk  (8/29/2024)    Overall Financial Resource Strain (CARDIA)     Difficulty of Paying Living Expenses: Not hard at all   Recent Concern: Financial Resource Strain -  Medium Risk (8/25/2024)    Overall Financial Resource Strain (CARDIA)     Difficulty of Paying Living Expenses: Somewhat hard   Food Insecurity: No Food Insecurity (8/29/2024)    Hunger Vital Sign     Worried About Running Out of Food in the Last Year: Never true     Ran Out of Food in the Last Year: Never true   Recent Concern: Food Insecurity - Food Insecurity Present (8/25/2024)    Hunger Vital Sign     Worried About Running Out of Food in the Last Year: Often true     Ran Out of Food in the Last Year: Often true   Transportation Needs: No Transportation Needs (8/29/2024)    TRANSPORTATION NEEDS     Transportation : No   Physical Activity: Sufficiently Active (8/29/2024)    Exercise Vital Sign     Days of Exercise per Week: 5 days     Minutes of Exercise per Session: 30 min   Recent Concern: Physical Activity - Inactive (8/25/2024)    Exercise Vital Sign     Days of Exercise per Week: 0 days     Minutes of Exercise per Session: 0 min   Stress: No Stress Concern Present (8/29/2024)    [place] [place] [employer] - Occupational Stress Questionnaire     Feeling of Stress : Not at all   Recent Concern: Stress - Stress Concern Present (8/25/2024)    [place] [place] [employer] - Occupational Stress Questionnaire     Feeling of Stress : Rather much   Housing Stability: Low Risk  (8/29/2024)    Housing Stability Vital Sign     Unable to Pay for Housing in the Last Year: No     Homeless in the Last Year: No   Recent Concern: Housing Stability - High Risk (8/25/2024)    Housing Stability Vital Sign     Unable to Pay for Housing in the Last Year: Yes     Homeless in the Last Year: No       Medications:  No current facility-administered medications on file prior to encounter.     Current Outpatient Medications on File Prior to Encounter   Medication Sig Dispense Refill    albuterol (VENTOLIN HFA) 90 mcg/actuation inhaler Inhale 2 puffs every 4 hours by inhalation route. 8 g 2    apixaban (ELIQUIS)  5 mg Tab Take 1 tablet (5 mg total) by mouth 2 (two) times daily. Patient w/ thrombocytopenia <50, so held during admission, follow-up with hematologist about restarting 180 tablet 1    brinzolamide (AZOPT) 1 % ophthalmic suspension Place1 drop in left eye 3 times a day for 30 days 15 mL 6    cetirizine (ZYRTEC) 10 MG tablet Take 1 tablet every day by oral route. 30 tablet 0    FLUoxetine 40 MG capsule Take 1 capsule every day by oral route. 30 capsule 2    fluticasone propionate (FLONASE ALLERGY RELIEF) 50 mcg/actuation nasal spray [place] 1 spray every day by intranasal route. 16 g 2    gabapentin (NEURONTIN) 300 MG capsule Take 2 capsules twice a day by oral route for 90 days. 360 capsule 1    hydrALAZINE (APRESOLINE) 50 MG tablet Take 1 tablet (50 mg total) by mouth every 8 (eight) hours. 90 tablet 0    insulin aspart U-100 (NOVOLOG) 100 unit/mL (3 mL) InPn pen Inject 8 Units into the skin 3 (three) times daily with meals. 15 mL 0    insulin glargine U-100, Lantus, (LANTUS SOLOSTAR U-100 INSULIN) 100 unit/mL (3 mL) InPn pen Inject 22 units every day by subcutaneous route in the evening for 30 days, for diabetes. 15 mL 2    levETIRAcetam (KEPPRA) 500 MG Tab Take 1 tablet (500 mg total) by mouth 2 (two) times daily. 60 tablet 11    ondansetron (ZOFRAN-ODT) 4 MG TbDL Place 1 tablet (4 mg) on or under the tongue every 8 hours for 10 days. 24 tablet 2    pantoprazole (PROTONIX) 40 MG tablet Take 1 tablet (40 mg total) by mouth once daily. 30 tablet 0    prednisoLONE acetate (PRED FORTE) 1 % DrpS Place 1 drop into the left eye 2 (two) times daily. 5 mL 3    promethazine (PHENERGAN) 25 MG tablet Take 1 tablet (25 mg total) by mouth every 6 (six) hours as needed. 15 tablet 0    sevelamer carbonate (RENVELA) 800 mg Tab Take 1 tablet (800 mg total) by mouth 3 (three) times daily with meals. 180 tablet 11    sucralfate (CARAFATE) 1 gram tablet Take 1 tablet (1 g total) by mouth 4 (four) times daily. 100 tablet 1    atropine  "1% (ISOPTO ATROPINE) 1 % Drop Place 1 drop into the left eye 2 (two) times a day. 15 mL 3    blood sugar diagnostic (TRUE METRIX GLUCOSE TEST STRIP) Strp Use 1 strip to check blood glucose three times daily 100 each 11    blood-glucose meter (TRUE METRIX GLUCOSE METER) Misc Use to check blood glucose 1 each 0    blood-glucose meter,continuous Misc USE WITH SENSORS 1 each 0    blood-glucose sensor (DEXCOM G7 SENSOR) Heather Use as directed for CGM. Change sensor every 10 days 3 each 0    blood-glucose sensor Heather CHANGE EVERY 10 DAYS 3 each 0    brimonidine 0.15 % OPTH DROP (ALPHAGAN) 0.15 % ophthalmic solution Place 1 drop into both eyes 3 (three) times daily. 15 mL 3    busPIRone (BUSPAR) 10 MG tablet Take 1 tablet (10 mg total) by mouth 3 (three) times daily as needed (anxiety). 60 tablet 5    dorzolamide-timolol 2-0.5% (COSOPT) 22.3-6.8 mg/mL ophthalmic solution place 1 drop in left eye twice a day  for 30 days 10 mL 0    lancets (TRUEPLUS LANCETS) 33 gauge Misc Use 1 to check blood glucose three times daily 100 each 11    oxyCODONE-acetaminophen (PERCOCET)  mg per tablet Take 1 tablet by mouth every 4 (four) hours as needed for Pain. 42 tablet 0    pen needle, diabetic 31 gauge x 3/16" Ndle Use as directed with insulin once daily 100 each 0    timolol maleate 0.5% (TIMOPTIC) 0.5 % Drop Place 1 drop in left eye 2 times a day for 30 days 5 mL 6    [DISCONTINUED] atenoloL (TENORMIN) 50 MG tablet Take 1 tablet (50 mg total) by mouth every other day. 45 tablet 3    [DISCONTINUED] insulin detemir U-100, Levemir, (LEVEMIR FLEXTOUCH U100 INSULIN) 100 unit/mL (3 mL) InPn pen Inject 22 units every day by subcutaneous route in the evening for 90 days. 18 mL 1    [DISCONTINUED] omeprazole (PRILOSEC) 20 MG capsule Take 2 capsules (40 mg total) by mouth once daily. for 10 days 20 capsule 0     Scheduled Meds:   apixaban  5 mg Oral BID    epoetin teresa-epbx  50 Units/kg Intravenous Every Tues, Thurs, Sat    FLUoxetine  40 mg " "Oral Daily    insulin glargine U-100 (Lantus)  20 Units Subcutaneous QHS    levETIRAcetam  500 mg Oral BID    pantoprazole  40 mg Oral Daily    prednisoLONE acetate  1 drop Left Eye BID    sevelamer carbonate  800 mg Oral TID WM    sucralfate  1 g Oral QID     Continuous Infusions:  PRN Meds:.  Current Facility-Administered Medications:     acetaminophen, 650 mg, Oral, Q4H PRN    albuterol-ipratropium, 3 mL, Nebulization, Q6H PRN    aluminum-magnesium hydroxide-simethicone, 30 mL, Oral, QID PRN    dextrose 10%, 12.5 g, Intravenous, PRN    dextrose 10%, 25 g, Intravenous, PRN    glucagon (human recombinant), 1 mg, Intramuscular, PRN    glucose, 16 g, Oral, PRN    glucose, 24 g, Oral, PRN    HYDROcodone-acetaminophen, 1 tablet, Oral, Q6H PRN    insulin aspart U-100, 0-5 Units, Subcutaneous, QID (AC + HS) PRN    lorazepam, 1 mg, Intravenous, Q6H PRN    naloxone, 0.02 mg, Intravenous, PRN    promethazine (PHENERGAN) 12.5 mg in 0.9% NaCl 50 mL IVPB, 12.5 mg, Intravenous, Q6H PRN    simethicone, 1 tablet, Oral, QID PRN    sodium chloride 0.9%, 10 mL, Intravenous, Q12H PRN    Allergies:  Droperidol, Haldol [haloperidol lactate], Penicillins, and Droperidol (bulk)    Past Family History:  Reviewed; refer to Hospitalist Admission Note    Review of Systems:  Review of Systems - All 14 systems reviewed and negative, except as noted in HPI    Physical Exam:    BP 97/66   Pulse 88   Temp 98 °F (36.7 °C) (Axillary)   Resp 18   Ht 5' 2" (1.575 m)   Wt 47.5 kg (104 lb 11.5 oz)   LMP  (LMP Unknown) Comment: pt states last mentral cycle was 3yrs ago  SpO2 100%   Breastfeeding No   BMI 19.15 kg/m²     General Appearance:    Alert, cooperative, no distress, appears stated age   Head:    Normocephalic, without obvious abnormality, atraumatic   Eyes:    PER, conjunctiva/corneas clear, EOM's intact in both eyes        Throat:   Lips, mucosa, and tongue normal; teeth and gums normal   Back:     Symmetric, no curvature, ROM normal, " no CVA tenderness   Lungs:     Clear to auscultation bilaterally, respirations unlabored   Chest wall:    No tenderness or deformity   Heart:    Regular rate and rhythm, S1 and S2 normal, no murmur, rub   or gallop   Abdomen:     Soft, non-tender, bowel sounds active all four quadrants,     no masses, no organomegaly   Extremities:   Extremities normal, atraumatic, no cyanosis or edema   Pulses:   2+ and symmetric all extremities   MSK:   No joint or muscle swelling, tenderness or deformity   Skin:   Skin color, texture, turgor normal, no rashes or lesions   Neurologic:   CNII-XII intact, normal strength and sensation       Throughout.  No flap     Results:  Lab Results   Component Value Date     09/21/2024    K 4.1 09/21/2024     09/21/2024    CO2 25 09/21/2024    BUN 24 (H) 09/21/2024    CREATININE 6.4 (H) 09/21/2024    CALCIUM 9.5 09/21/2024    ANIONGAP 12 09/21/2024    ESTGFRAFRICA 19 (L) 09/03/2022    EGFRNONAA 18 (A) 07/31/2022       Lab Results   Component Value Date    CALCIUM 9.5 09/21/2024    PHOS 4.1 09/21/2024       Recent Labs   Lab 09/21/24  0534   WBC 5.96   RBC 3.44*   HGB 9.9*   HCT 29.6*      MCV 86   MCH 28.8   MCHC 33.4          I have personally reviewed pertinent radiological imaging and reports.    Geeta Kaur NP    [facility]  [phone]

## 2024-09-21 NOTE — CARE UPDATE
09/21/24 0914   Patient Assessment/Suction   Level of Consciousness (AVPU) alert   Respiratory Effort Unlabored;Normal   Expansion/Accessory Muscles/Retractions no use of accessory muscles;no retractions;expansion symmetric   Rhythm/Pattern, Respiratory unlabored;pattern regular;depth regular;no shortness of breath reported   PRE-TX-O2   Device (Oxygen Therapy) room air   SpO2 96 %   Pulse Oximetry Type Intermittent   $ Pulse Oximetry - Multiple Charge Pulse Oximetry - Multiple   Pulse 92   Resp 18   Aerosol Therapy   $ Aerosol Therapy Charges PRN treatment not required   Respiratory Treatment Status (SVN) PRN treatment not required

## 2024-09-21 NOTE — PROGRESS NOTES
[facility] Medicine  Progress Note    Patient Name: Tabby Howard  MRN: [id number]  Patient Class: OP- Observation   Admission Date: 9/19/2024  Length of Stay: 0 days  Attending Physician: Alice Fish MD  Primary Care Provider: Melony Kim NP        Subjective:     Principal Problem:High anion gap metabolic acidosis        HPI:  Tabby Howard is a 34 year old female with a previous medical history of ESRD on dialysis Tue/Thur/Sat, chronic abdominal pain, HTN, Type II Diabetes, who presented to the ED  for left-sided flank pain. HPI is limited to chart review and ED note due to patient not being cooperative.  Per chart review, patient has multiple similar episodes in the past and has had multiple previous CT scans performed.  Also has had multiple hospitalizations for DKA in the past. Patient reports last dialysis was Monday 9/9/24 due to them scheduling her that day. She was unable to attend her normal Thursday session due to severe abdominal pain and vomiting. Of note she has had 5 ER visits over the past five days and most recent admission was on 9/10/24. She was admitted for DKA and insulin drip discontinued the next day. She received dialysis along with an EGD that was negative for any acute process. Initial ED evaluation showed elevated anion gap and betahydroxybutyrate but low normal PH. Dr. Bradford consulted and he placed orders for dialysis. Pain and nausea controlled with ativan and phenergan. Patient admitted by hospital medicine for further evaluation and management.     Overview/Hospital Course:  Patient is a 34 year old patient with diabetes and ESRD on HD, frequent hospital admissions for multiple complains, chronic abdominal pain. Patient was recently discharged few days ago. Patient came back with worsening pain on the left flank, nausea and vomiting. Patient is euglycemic. However beta hydroxybutyrate was elevated. Patient was hydrated. Acidosis  improved.    Interval History: Still complaining of left flank pain. Acidosis has improved.    Review of Systems  Objective:     Vital Signs (Most Recent):  Temp: 98 °F (36.7 °C) (09/21/24 0955)  Pulse: 91 (09/21/24 1030)  Resp: 18 (09/21/24 0955)  BP: 103/71 (09/21/24 1030)  SpO2: 100 % (09/21/24 0955) Vital Signs (24h Range):  Temp:  [97.7 °F (36.5 °C)-98.8 °F (37.1 °C)] 98 °F (36.7 °C)  Pulse:  [] 91  Resp:  [18] 18  SpO2:  [96 %-100 %] 100 %  BP: ()/(61-91) 103/71     Weight: 47.5 kg (104 lb 11.5 oz)  Body mass index is 19.15 kg/m².    Intake/Output Summary (Last 24 hours) at 9/21/2024 1043  Last data filed at 9/20/2024 1825  Gross per 24 hour   Intake 1105.18 ml   Output --   Net 1105.18 ml         Physical Exam  Vitals reviewed.   Constitutional:       Appearance: She is ill-appearing.      Comments: Chronically ill appearing   HENT:      Head: Normocephalic and atraumatic.      Nose: Nose normal.      Mouth/Throat:      Mouth: Mucous membranes are dry.      Pharynx: Oropharynx is clear.   Eyes:      Conjunctiva/sclera:      Left eye: Hemorrhage present.   Cardiovascular:      Rate and Rhythm: Regular rhythm.      Pulses:           Radial pulses are 2+ on the right side and 2+ on the left side.        Dorsalis pedis pulses are 2+ on the right side and 2+ on the left side.      Arteriovenous access: Left arteriovenous access is present.  Pulmonary:      Effort: Pulmonary effort is normal.      Breath sounds: Normal breath sounds.   Abdominal:      General: Bowel sounds are normal.      Palpations: Abdomen is soft.      Tenderness: There is generalized abdominal tenderness.   Musculoskeletal:         General: Normal range of motion.      Cervical back: Normal range of motion and neck supple.      Right lower leg: No edema.      Left lower leg: No edema.   Skin:     General: Skin is warm and dry.      Capillary Refill: Capillary refill takes less than 2 seconds.   Neurological:      General: No focal  deficit present.      Mental Status: She is alert and oriented to person, place, and time. Mental status is at baseline.   Psychiatric:         Attention and Perception: Attention normal.         Behavior: Behavior is withdrawn       Significant Labs: All pertinent labs within the past 24 hours have been reviewed.  CBC:   Recent Labs   Lab 09/20/24 0415 09/21/24  0534   WBC 7.09 5.96   HGB 9.5* 9.9*   HCT 28.8* 29.6*   * 166     CMP:   Recent Labs   Lab 09/20/24  0415 09/21/24  0534    140   K 3.8 4.1   CL 99 103   CO2 22* 25   * 101   BUN 10 24*   CREATININE 3.9* 6.4*   CALCIUM 9.7 9.5   PROT 7.1 6.7   ALBUMIN 3.4* 3.1*   BILITOT 1.1* 0.9   ALKPHOS 91 79   AST 18 14   ALT 9* 9*   ANIONGAP 19* 12       Significant Imaging: I have reviewed all pertinent imaging results/findings within the past 24 hours.  Physical Exam    Assessment/Plan:      * High anion gap metabolic acidosis  beta hydroxybutyrate elevated   Patient is slightly acidotic, but euglycemic  - continue IVF         Chronic abdominal pain  Abdominal pain and nausea controlled with ativan and phenergan.       ESRD (end stage renal disease) on dialysis  Creatine stable for now. BMP reviewed- noted Estimated Creatinine Clearance: 9.2 mL/min (A) (based on SCr of 6.4 mg/dL (H)). according to latest data. Based on current GFR, CKD stage is end stage.  Monitor UOP and serial BMP and adjust therapy as needed. Renally dose meds. Avoid nephrotoxic medications and procedures.    Last full dialysis session on Monday 9/16/24. Patient normally Tue/Thur/Sat. Missed appointment due to abdominal pain  Dr. Figueroa placed orders for dialysis   Daily cbc, cmp, mag, phos    Diabetes  Patient's FSGs are uncontrolled due to hyperglycemia on current medication regimen.  Last A1c reviewed-   Lab Results   Component Value Date    HGBA1C 9.0 (H) 09/10/2024     Most recent fingerstick glucose reviewed-   Recent Labs   Lab 09/19/24  1827 09/20/24  0040  09/20/24  0433 09/20/24  0745   POCTGLUCOSE 304* 107 162* 187*       Current correctional scale  Low  Decrease anti-hyperglycemic dose as follows-   Antihyperglycemics (From admission, onward)      Start     Stop Route Frequency Ordered    09/19/24 2100  insulin glargine U-100 (Lantus) pen 20 Units         -- SubQ Nightly 09/19/24 1751    09/19/24 1732  insulin aspart U-100 pen 0-5 Units         -- SubQ Before meals & nightly PRN 09/19/24 1632          Hold Oral hypoglycemics while patient is in the hospital.    Seizures  -Seizure precautions  -Keppra continued BID      Hypertension  Chronic, controlled. Latest blood pressure and vitals reviewed-     Home meds for hypertension were reviewed and noted below.   Hypertension Medications               hydrALAZINE (APRESOLINE) 50 MG tablet Take 1 tablet (50 mg total) by mouth every 8 (eight) hours.            While in the hospital, will manage blood pressure as follows; Continue home antihypertensive regimen    Will utilize p.r.n. blood pressure medication only if patient's blood pressure greater than 160/100 and she develops symptoms such as worsening chest pain or shortness of breath.      VTE Risk Mitigation (From admission, onward)           Ordered     apixaban tablet 5 mg  2 times daily         09/19/24 1751     IP VTE HIGH RISK PATIENT  Once         09/19/24 1624     Place sequential compression device  Until discontinued         09/19/24 1624                    Discharge Planning   TATIAAN: 9/22/2024     Code Status: Full Code   Is the patient medically ready for discharge?:     Reason for patient still in hospital (select all that apply): Patient trending condition  Discharge Plan A: Home with family                  Alice Fish MD, MD  Department of Hospital Medicine   [facility] - Med/Surg

## 2024-09-21 NOTE — PLAN OF CARE
Problem: Adult Inpatient Plan of Care  Goal: Plan of Care Review  Outcome: Progressing  Goal: Optimal Comfort and Wellbeing  Outcome: Progressing     Problem: Diabetes Comorbidity  Goal: Blood Glucose Level Within Targeted Range  Outcome: Progressing     Problem: Wound  Goal: Absence of Infection Signs and Symptoms  Outcome: Progressing  Goal: Optimal Pain Control and Function  Outcome: Progressing     Problem: Fall Injury Risk  Goal: Absence of Fall and Fall-Related Injury  Outcome: Progressing

## 2024-09-21 NOTE — PROGRESS NOTES
09/21/24 1305   Vital Signs   Temp 98.2 °F (36.8 °C)   Temp Source Axillary   Pulse 87   Resp 18   SpO2 100 %   Device (Oxygen Therapy) room air   BP (!) 144/87        Hemodialysis AV Fistula Left upper arm   No placement date or time found.   Location: Left upper arm   Site Assessment Clean;Dry;Intact   Patency Present;Thrill;Bruit   Status Deaccessed   Flows Good   Dressing Intervention First dressing   Dressing Status Clean;Dry;Intact   Site Condition No complications   Dressing Gauze   Post-Hemodialysis Assessment   Rinseback Volume (mL) 250 mL   Blood Volume Processed (Liters) 53 L   Dialyzer Clearance Moderately streaked   Duration of Treatment 180 minutes   Additional Fluid Intake (mL) 500 mL   Total UF (mL) 915 mL   Net Fluid Removal 415   Patient Response to Treatment Tolerated tx but had some hypotension   Post-Treatment Weight 47 kg (103 lb 9.9 oz)   Treatment Weight Change -0.5   Arterial bleeding stop time (min) 5 min   Venous bleeding stop time (min) 5 min   Post-Hemodialysis Comments Tx ended per protocol     Consent for hd verified. Educated on on infection prevention. Report given to Segundo WYNN.

## 2024-09-21 NOTE — SUBJECTIVE & OBJECTIVE
Interval History: Still complaining of left flank pain. Acidosis has improved.    Review of Systems  Objective:     Vital Signs (Most Recent):  Temp: 98 °F (36.7 °C) (09/21/24 0955)  Pulse: 91 (09/21/24 1030)  Resp: 18 (09/21/24 0955)  BP: 103/71 (09/21/24 1030)  SpO2: 100 % (09/21/24 0955) Vital Signs (24h Range):  Temp:  [97.7 °F (36.5 °C)-98.8 °F (37.1 °C)] 98 °F (36.7 °C)  Pulse:  [] 91  Resp:  [18] 18  SpO2:  [96 %-100 %] 100 %  BP: ()/(61-91) 103/71     Weight: 47.5 kg (104 lb 11.5 oz)  Body mass index is 19.15 kg/m².    Intake/Output Summary (Last 24 hours) at 9/21/2024 1043  Last data filed at 9/20/2024 1825  Gross per 24 hour   Intake 1105.18 ml   Output --   Net 1105.18 ml         Physical Exam  Vitals reviewed.   Constitutional:       Appearance: She is ill-appearing.      Comments: Chronically ill appearing   HENT:      Head: Normocephalic and atraumatic.      Nose: Nose normal.      Mouth/Throat:      Mouth: Mucous membranes are dry.      Pharynx: Oropharynx is clear.   Eyes:      Conjunctiva/sclera:      Left eye: Hemorrhage present.   Cardiovascular:      Rate and Rhythm: Regular rhythm.      Pulses:           Radial pulses are 2+ on the right side and 2+ on the left side.        Dorsalis pedis pulses are 2+ on the right side and 2+ on the left side.      Arteriovenous access: Left arteriovenous access is present.  Pulmonary:      Effort: Pulmonary effort is normal.      Breath sounds: Normal breath sounds.   Abdominal:      General: Bowel sounds are normal.      Palpations: Abdomen is soft.      Tenderness: There is generalized abdominal tenderness.   Musculoskeletal:         General: Normal range of motion.      Cervical back: Normal range of motion and neck supple.      Right lower leg: No edema.      Left lower leg: No edema.   Skin:     General: Skin is warm and dry.      Capillary Refill: Capillary refill takes less than 2 seconds.   Neurological:      General: No focal deficit  present.      Mental Status: She is alert and oriented to person, place, and time. Mental status is at baseline.   Psychiatric:         Attention and Perception: Attention normal.         Behavior: Behavior is withdrawn       Significant Labs: All pertinent labs within the past 24 hours have been reviewed.  CBC:   Recent Labs   Lab 09/20/24  0415 09/21/24  0534   WBC 7.09 5.96   HGB 9.5* 9.9*   HCT 28.8* 29.6*   * 166     CMP:   Recent Labs   Lab 09/20/24  0415 09/21/24  0534    140   K 3.8 4.1   CL 99 103   CO2 22* 25   * 101   BUN 10 24*   CREATININE 3.9* 6.4*   CALCIUM 9.7 9.5   PROT 7.1 6.7   ALBUMIN 3.4* 3.1*   BILITOT 1.1* 0.9   ALKPHOS 91 79   AST 18 14   ALT 9* 9*   ANIONGAP 19* 12       Significant Imaging: I have reviewed all pertinent imaging results/findings within the past 24 hours.  Physical Exam

## 2024-09-21 NOTE — ASSESSMENT & PLAN NOTE
Creatine stable for now. BMP reviewed- noted Estimated Creatinine Clearance: 9.2 mL/min (A) (based on SCr of 6.4 mg/dL (H)). according to latest data. Based on current GFR, CKD stage is end stage.  Monitor UOP and serial BMP and adjust therapy as needed. Renally dose meds. Avoid nephrotoxic medications and procedures.    Last full dialysis session on Monday 9/16/24. Patient normally Tue/Thur/Sat. Missed appointment due to abdominal pain  Dr. Figueroa placed orders for dialysis   Daily cbc, cmp, mag, phos

## 2024-09-22 LAB
ALBUMIN SERPL BCP-MCNC: 3 G/DL (ref 3.5–5.2)
ALP SERPL-CCNC: 85 U/L (ref 55–135)
ALT SERPL W/O P-5'-P-CCNC: 10 U/L (ref 10–44)
ANION GAP SERPL CALC-SCNC: 11 MMOL/L (ref 8–16)
AST SERPL-CCNC: 14 U/L (ref 10–40)
BILIRUB SERPL-MCNC: 0.6 MG/DL (ref 0.1–1)
BUN SERPL-MCNC: 22 MG/DL (ref 6–20)
CALCIUM SERPL-MCNC: 8.9 MG/DL (ref 8.7–10.5)
CHLORIDE SERPL-SCNC: 96 MMOL/L (ref 95–110)
CO2 SERPL-SCNC: 25 MMOL/L (ref 23–29)
CREAT SERPL-MCNC: 4.5 MG/DL (ref 0.5–1.4)
ERYTHROCYTE [DISTWIDTH] IN BLOOD BY AUTOMATED COUNT: 17 % (ref 11.5–14.5)
EST. GFR  (NO RACE VARIABLE): 12 ML/MIN/1.73 M^2
GLUCOSE SERPL-MCNC: 173 MG/DL (ref 70–110)
GLUCOSE SERPL-MCNC: 515 MG/DL (ref 70–110)
HCT VFR BLD AUTO: 28.9 % (ref 37–48.5)
HGB BLD-MCNC: 9.6 G/DL (ref 12–16)
MAGNESIUM SERPL-MCNC: 2 MG/DL (ref 1.6–2.6)
MCH RBC QN AUTO: 29 PG (ref 27–31)
MCHC RBC AUTO-ENTMCNC: 33.2 G/DL (ref 32–36)
MCV RBC AUTO: 87 FL (ref 82–98)
PHOSPHATE SERPL-MCNC: 2.5 MG/DL (ref 2.7–4.5)
PLATELET # BLD AUTO: 154 K/UL (ref 150–450)
PMV BLD AUTO: 10.6 FL (ref 9.2–12.9)
POCT GLUCOSE: 128 MG/DL (ref 70–110)
POCT GLUCOSE: 132 MG/DL (ref 70–110)
POCT GLUCOSE: 291 MG/DL (ref 70–110)
POCT GLUCOSE: 349 MG/DL (ref 70–110)
POCT GLUCOSE: 450 MG/DL (ref 70–110)
POCT GLUCOSE: >500 MG/DL (ref 70–110)
POTASSIUM SERPL-SCNC: 4.3 MMOL/L (ref 3.5–5.1)
PROT SERPL-MCNC: 6.6 G/DL (ref 6–8.4)
RBC # BLD AUTO: 3.31 M/UL (ref 4–5.4)
SODIUM SERPL-SCNC: 132 MMOL/L (ref 136–145)
WBC # BLD AUTO: 6.25 K/UL (ref 3.9–12.7)

## 2024-09-22 PROCEDURE — 82947 ASSAY GLUCOSE BLOOD QUANT: CPT | Performed by: INTERNAL MEDICINE

## 2024-09-22 PROCEDURE — 36415 COLL VENOUS BLD VENIPUNCTURE: CPT | Performed by: INTERNAL MEDICINE

## 2024-09-22 PROCEDURE — 25000003 PHARM REV CODE 250

## 2024-09-22 PROCEDURE — 36415 COLL VENOUS BLD VENIPUNCTURE: CPT

## 2024-09-22 PROCEDURE — 83735 ASSAY OF MAGNESIUM: CPT

## 2024-09-22 PROCEDURE — 84100 ASSAY OF PHOSPHORUS: CPT

## 2024-09-22 PROCEDURE — 63600175 PHARM REV CODE 636 W HCPCS

## 2024-09-22 PROCEDURE — 85027 COMPLETE CBC AUTOMATED: CPT

## 2024-09-22 PROCEDURE — 94761 N-INVAS EAR/PLS OXIMETRY MLT: CPT

## 2024-09-22 PROCEDURE — 96372 THER/PROPH/DIAG INJ SC/IM: CPT

## 2024-09-22 PROCEDURE — 11000001 HC ACUTE MED/SURG PRIVATE ROOM

## 2024-09-22 PROCEDURE — 25000003 PHARM REV CODE 250: Performed by: NURSE PRACTITIONER

## 2024-09-22 PROCEDURE — 63600175 PHARM REV CODE 636 W HCPCS: Performed by: NURSE PRACTITIONER

## 2024-09-22 PROCEDURE — 99900035 HC TECH TIME PER 15 MIN (STAT)

## 2024-09-22 PROCEDURE — 80053 COMPREHEN METABOLIC PANEL: CPT

## 2024-09-22 PROCEDURE — G0378 HOSPITAL OBSERVATION PER HR: HCPCS

## 2024-09-22 PROCEDURE — 25000003 PHARM REV CODE 250: Performed by: INTERNAL MEDICINE

## 2024-09-22 RX ORDER — HYDRALAZINE HYDROCHLORIDE 25 MG/1
25 TABLET, FILM COATED ORAL EVERY 8 HOURS
Status: DISCONTINUED | OUTPATIENT
Start: 2024-09-22 | End: 2024-09-28

## 2024-09-22 RX ORDER — INSULIN ASPART 100 [IU]/ML
0-10 INJECTION, SOLUTION INTRAVENOUS; SUBCUTANEOUS
Status: DISCONTINUED | OUTPATIENT
Start: 2024-09-22 | End: 2024-09-30 | Stop reason: HOSPADM

## 2024-09-22 RX ADMIN — PREDNISOLONE ACETATE 1 DROP: 10 SUSPENSION/ DROPS OPHTHALMIC at 08:09

## 2024-09-22 RX ADMIN — SUCRALFATE 1 G: 1 TABLET ORAL at 08:09

## 2024-09-22 RX ADMIN — APIXABAN 5 MG: 2.5 TABLET, FILM COATED ORAL at 08:09

## 2024-09-22 RX ADMIN — LEVETIRACETAM 500 MG: 250 TABLET, FILM COATED ORAL at 08:09

## 2024-09-22 RX ADMIN — INSULIN ASPART 5 UNITS: 100 INJECTION, SOLUTION INTRAVENOUS; SUBCUTANEOUS at 11:09

## 2024-09-22 RX ADMIN — HUMAN INSULIN 6 UNITS: 100 INJECTION, SOLUTION SUBCUTANEOUS at 10:09

## 2024-09-22 RX ADMIN — SEVELAMER CARBONATE 800 MG: 800 TABLET, FILM COATED ORAL at 05:09

## 2024-09-22 RX ADMIN — SUCRALFATE 1 G: 1 TABLET ORAL at 05:09

## 2024-09-22 RX ADMIN — PANTOPRAZOLE SODIUM 40 MG: 40 TABLET, DELAYED RELEASE ORAL at 08:09

## 2024-09-22 RX ADMIN — LORAZEPAM 1 MG: 2 INJECTION INTRAMUSCULAR; INTRAVENOUS at 08:09

## 2024-09-22 RX ADMIN — LORAZEPAM 1 MG: 2 INJECTION INTRAMUSCULAR; INTRAVENOUS at 12:09

## 2024-09-22 RX ADMIN — INSULIN ASPART 3 UNITS: 100 INJECTION, SOLUTION INTRAVENOUS; SUBCUTANEOUS at 05:09

## 2024-09-22 RX ADMIN — SEVELAMER CARBONATE 800 MG: 800 TABLET, FILM COATED ORAL at 08:09

## 2024-09-22 RX ADMIN — HYDROCODONE BITARTRATE AND ACETAMINOPHEN 1 TABLET: 5; 325 TABLET ORAL at 08:09

## 2024-09-22 RX ADMIN — SUCRALFATE 1 G: 1 TABLET ORAL at 12:09

## 2024-09-22 RX ADMIN — HYDRALAZINE HYDROCHLORIDE 25 MG: 25 TABLET ORAL at 08:09

## 2024-09-22 RX ADMIN — HYDRALAZINE HYDROCHLORIDE 25 MG: 25 TABLET ORAL at 03:09

## 2024-09-22 RX ADMIN — SEVELAMER CARBONATE 800 MG: 800 TABLET, FILM COATED ORAL at 12:09

## 2024-09-22 RX ADMIN — INSULIN ASPART 3 UNITS: 100 INJECTION, SOLUTION INTRAVENOUS; SUBCUTANEOUS at 08:09

## 2024-09-22 RX ADMIN — INSULIN GLARGINE 20 UNITS: 100 INJECTION, SOLUTION SUBCUTANEOUS at 08:09

## 2024-09-22 RX ADMIN — FLUOXETINE HYDROCHLORIDE 40 MG: 20 CAPSULE ORAL at 08:09

## 2024-09-22 NOTE — SUBJECTIVE & OBJECTIVE
Interval History: Still complaining of left flank pain. Acidosis has improved.    Review of Systems  Objective:     Vital Signs (Most Recent):  Temp: 97.9 °F (36.6 °C) (09/22/24 0824)  Pulse: 99 (09/22/24 0824)  Resp: 20 (Simultaneous filing. User may not have seen previous data.) (09/22/24 0824)  BP: (!) 153/76 (09/22/24 0824)  SpO2: 100 % (09/22/24 0824) Vital Signs (24h Range):  Temp:  [97.3 °F (36.3 °C)-99 °F (37.2 °C)] 97.9 °F (36.6 °C)  Pulse:  [] 99  Resp:  [16-20] 20  SpO2:  [98 %-100 %] 100 %  BP: ()/() 153/76     Weight: 47.5 kg (104 lb 11.5 oz)  Body mass index is 19.15 kg/m².    Intake/Output Summary (Last 24 hours) at 9/22/2024 1119  Last data filed at 9/22/2024 0630  Gross per 24 hour   Intake 1490 ml   Output 915 ml   Net 575 ml         Physical Exam  Vitals reviewed.   Constitutional:       Appearance: She is ill-appearing.      Comments: Chronically ill appearing   HENT:      Head: Normocephalic and atraumatic.      Nose: Nose normal.      Mouth/Throat:      Mouth: Mucous membranes are dry.      Pharynx: Oropharynx is clear.   Eyes:      Conjunctiva/sclera:      Left eye: Hemorrhage present.   Cardiovascular:      Rate and Rhythm: Regular rhythm.      Pulses:           Radial pulses are 2+ on the right side and 2+ on the left side.        Dorsalis pedis pulses are 2+ on the right side and 2+ on the left side.      Arteriovenous access: Left arteriovenous access is present.  Pulmonary:      Effort: Pulmonary effort is normal.      Breath sounds: Normal breath sounds.   Abdominal:      General: Bowel sounds are normal.      Palpations: Abdomen is soft.      Tenderness: There is generalized abdominal tenderness.   Musculoskeletal:         General: Normal range of motion.      Cervical back: Normal range of motion and neck supple.      Right lower leg: No edema.      Left lower leg: No edema.   Skin:     General: Skin is warm and dry.      Capillary Refill: Capillary refill takes less  than 2 seconds.   Neurological:      General: No focal deficit present.      Mental Status: She is alert and oriented to person, place, and time. Mental status is at baseline.   Psychiatric:         Attention and Perception: Attention normal.         Behavior: Behavior is withdrawn       Significant Labs: All pertinent labs within the past 24 hours have been reviewed.  CBC:   Recent Labs   Lab 09/21/24  0534 09/22/24  0508   WBC 5.96 6.25   HGB 9.9* 9.6*   HCT 29.6* 28.9*    154     CMP:   Recent Labs   Lab 09/21/24  0534 09/22/24  0508    132*   K 4.1 4.3    96   CO2 25 25    173*   BUN 24* 22*   CREATININE 6.4* 4.5*   CALCIUM 9.5 8.9   PROT 6.7 6.6   ALBUMIN 3.1* 3.0*   BILITOT 0.9 0.6   ALKPHOS 79 85   AST 14 14   ALT 9* 10   ANIONGAP 12 11       Significant Imaging: I have reviewed all pertinent imaging results/findings within the past 24 hours.  Physical Exam

## 2024-09-22 NOTE — CARE UPDATE
09/22/24 0820   Patient Assessment/Suction   Level of Consciousness (AVPU) alert   Respiratory Effort Unlabored   Expansion/Accessory Muscles/Retractions no use of accessory muscles;no retractions;expansion symmetric   Rhythm/Pattern, Respiratory unlabored;pattern regular   PRE-TX-O2   Device (Oxygen Therapy) room air   SpO2 100 %   Pulse Oximetry Type Intermittent   $ Pulse Oximetry - Multiple Charge Pulse Oximetry - Multiple   Pulse 104   Resp 20   Aerosol Therapy   $ Aerosol Therapy Charges PRN treatment not required   Respiratory Treatment Status (SVN) PRN treatment not required

## 2024-09-22 NOTE — PROGRESS NOTES
INPATIENT NEPHROLOGY progress  [facility]    Tabby Howard  09/22/2024    Reason for consultation:    esrd    Chief Complaint:   Chief Complaint   Patient presents with    Flank Pain     Left side flank pain, N&V starting this morning          History of Present Illness:    Per H and P  Tabby Howard is a 34 year old female with a previous medical history of ESRD on dialysis Tue/Thur/Sat, chronic abdominal pain, HTN, Type II Diabetes, who presented to the ED  for left-sided flank pain. HPI is limited to chart review and ED note due to patient not being cooperative.  Per chart review, patient has multiple similar episodes in the past and has had multiple previous CT scans performed.  Also has had multiple hospitalizations for DKA in the past. Patient reports last dialysis was Monday 9/9/24 due to them scheduling her that day. She was unable to attend her normal Thursday session due to severe abdominal pain and vomiting. Of note she has had 5 ER visits over the past five days and most recent admission was on 9/10/24. She was admitted for DKA and insulin drip discontinued the next day. She received dialysis along with an EGD that showed mild gastritis. Initial ED evaluation showed elevated anion gap and betahydroxybutyrate but low normal PH.      9/21  pressures low, but stable.  Lab results reviewed.  Seen today on dialysis.  Has hypotension, but alert and oriented.    9/22  pressures actually high now--will resume home hydralazine at low dose.  Lab results reviewed.  Still w/same pain, no other complaints.    Plan of Care:       Assessment:    esrd  --continue dialysis per routine  --fluid restrict  --renal dose medication per routine  --continue outpt medication  --continue binders with meals  --650 cc ultrafiltration last pm  --pt with 17 hospital admissions in last 12 months.   Perhaps a psychiatric evaluation is in order    Anemia  --erythropoiesis stimulating agent with renal replacement  therapy    Hyperphosphatemia  --renal diet  --continue binders    Hypertension  --uf with hd  --fluid restrict  --low salt diet  --continue home medication    DKA  --as per primary           Thank you for allowing us to participate in this patient's care. We will continue to follow.    Vital Signs:  Temp Readings from Last 3 Encounters:   24 97.9 °F (36.6 °C) (Oral)   24 98.4 °F (36.9 °C) (Oral)   24 98 °F (36.7 °C)       Pulse Readings from Last 3 Encounters:   24 99   24 94   24 92       BP Readings from Last 3 Encounters:   24 (!) 153/76   24 122/75   24 (!) 180/99       Weight:  Wt Readings from Last 3 Encounters:   24 47.5 kg (104 lb 11.5 oz)   24 52.6 kg (116 lb)   24 52.6 kg (116 lb)       Past Medical & Surgical History:  Past Medical History:   Diagnosis Date    ESRD on hemodialysis 2022    Gastritis 2022    EGD was 21    Gastroparesis 2022    has not had Emptying study    Heart failure with preserved ejection fraction 2022    EF 55% on 3/22    History of supraventricular tachycardia     Hyperkalemia 2022    Hypertensive emergency 2022    Sickle cell trait 2022    Type 2 diabetes mellitus        Past Surgical History:   Procedure Laterality Date     SECTION      x 3    COLONOSCOPY      COLONOSCOPY N/A 2022    Procedure: COLONOSCOPY;  Surgeon: Jagdeep Cedeno MD;  Location: [facility];  Service: Endoscopy;  Laterality: N/A;    DESTRUCTION, CILIARY BODY, USING LASER Left 2024    Procedure: Cyclophotocoagulation G-probe, retrobulbar chlorpromazine left eye;  Surgeon: Fidel Wise MD;  Location: [address];  Service: Ophthalmology;  Laterality: Left;    ENDOSCOPIC ULTRASOUND OF UPPER GASTROINTESTINAL TRACT N/A 2023    Procedure: ULTRASOUND, UPPER GI TRACT, ENDOSCOPIC;  Surgeon: Micky Paredes III, MD;  Location: [facility];  Service: Endoscopy;  Laterality:  N/A;    ESOPHAGOGASTRODUODENOSCOPY N/A 10/18/2019    Procedure: ESOPHAGOGASTRODUODENOSCOPY (EGD);  Surgeon: Gianluca Mendez MD;  Location: [facility] ENDO;  Service: Endoscopy;  Laterality: N/A;    ESOPHAGOGASTRODUODENOSCOPY N/A 08/24/2022    Procedure: EGD (ESOPHAGOGASTRODUODENOSCOPY);  Surgeon: Micky Paredes III, MD;  Location: [facility] ENDO;  Service: Endoscopy;  Laterality: N/A;    ESOPHAGOGASTRODUODENOSCOPY N/A 12/05/2022    Procedure: EGD (ESOPHAGOGASTRODUODENOSCOPY);  Surgeon: Marcelo Zhong MD;  Location: [facility];  Service: Endoscopy;  Laterality: N/A;    ESOPHAGOGASTRODUODENOSCOPY N/A 9/13/2024    Procedure: EGD (ESOPHAGOGASTRODUODENOSCOPY);  Surgeon: Marcelo Zhong MD;  Location: [facility];  Service: Endoscopy;  Laterality: N/A;    EYE SURGERY      INSERTION, CATHETER, TUNNELED N/A 06/17/2023    Procedure: Insertion,catheter,tunneled;  Surgeon: Carlos Thurman Jr., MD;  Location: [facility] OR;  Service: General;  Laterality: N/A;    LAPAROSCOPIC CHOLECYSTECTOMY N/A 07/30/2022    Procedure: CHOLECYSTECTOMY, LAPAROSCOPIC;  Surgeon: Grey Perez MD;  Location: [facility] OR;  Service: General;  Laterality: N/A;    PLACEMENT OF DUAL-LUMEN VASCULAR CATHETER Left 07/12/2022    Procedure: INSERTION-CATHETER-JOSEPH;  Surgeon: Dionte Gan MD;  Location: [facility] OR;  Service: General;  Laterality: Left;    PLACEMENT OF DUAL-LUMEN VASCULAR CATHETER Right 07/26/2022    Procedure: INSERTION-CATHETER-Hemosplit;  Surgeon: Dionte Gan MD;  Location: [facility] OR;  Service: General;  Laterality: Right;       Past Social History:  Social History     Socioeconomic History    Marital status:    Tobacco Use    Smoking status: Never    Smokeless tobacco: Never   Substance and Sexual Activity    Alcohol use: Not Currently    Drug use: No    Sexual activity: Not Currently     Partners: Male     Birth control/protection: I.U.D.     Social Determinants of Health     Financial Resource Strain: Low Risk  (8/29/2024)     Overall Financial Resource Strain (CARDIA)     Difficulty of Paying Living Expenses: Not hard at all   Recent Concern: Financial Resource Strain - Medium Risk (8/25/2024)    Overall Financial Resource Strain (CARDIA)     Difficulty of Paying Living Expenses: Somewhat hard   Food Insecurity: No Food Insecurity (8/29/2024)    Hunger Vital Sign     Worried About Running Out of Food in the Last Year: Never true     Ran Out of Food in the Last Year: Never true   Recent Concern: Food Insecurity - Food Insecurity Present (8/25/2024)    Hunger Vital Sign     Worried About Running Out of Food in the Last Year: Often true     Ran Out of Food in the Last Year: Often true   Transportation Needs: No Transportation Needs (8/29/2024)    TRANSPORTATION NEEDS     Transportation : No   Physical Activity: Sufficiently Active (8/29/2024)    Exercise Vital Sign     Days of Exercise per Week: 5 days     Minutes of Exercise per Session: 30 min   Recent Concern: Physical Activity - Inactive (8/25/2024)    Exercise Vital Sign     Days of Exercise per Week: 0 days     Minutes of Exercise per Session: 0 min   Stress: No Stress Concern Present (8/29/2024)    [place] [place] [employer] - Occupational Stress Questionnaire     Feeling of Stress : Not at all   Recent Concern: Stress - Stress Concern Present (8/25/2024)    [place] [place] [employer] - Occupational Stress Questionnaire     Feeling of Stress : Rather much   Housing Stability: Low Risk  (8/29/2024)    Housing Stability Vital Sign     Unable to Pay for Housing in the Last Year: No     Homeless in the Last Year: No   Recent Concern: Housing Stability - High Risk (8/25/2024)    Housing Stability Vital Sign     Unable to Pay for Housing in the Last Year: Yes     Homeless in the Last Year: No       Medications:  No current facility-administered medications on file prior to encounter.     Current Outpatient Medications on File Prior to Encounter   Medication Sig  Dispense Refill    albuterol (VENTOLIN HFA) 90 mcg/actuation inhaler Inhale 2 puffs every 4 hours by inhalation route. 8 g 2    apixaban (ELIQUIS) 5 mg Tab Take 1 tablet (5 mg total) by mouth 2 (two) times daily. Patient w/ thrombocytopenia <50, so held during admission, follow-up with hematologist about restarting 180 tablet 1    brinzolamide (AZOPT) 1 % ophthalmic suspension Place1 drop in left eye 3 times a day for 30 days 15 mL 6    cetirizine (ZYRTEC) 10 MG tablet Take 1 tablet every day by oral route. 30 tablet 0    FLUoxetine 40 MG capsule Take 1 capsule every day by oral route. 30 capsule 2    fluticasone propionate (FLONASE ALLERGY RELIEF) 50 mcg/actuation nasal spray [place] 1 spray every day by intranasal route. 16 g 2    gabapentin (NEURONTIN) 300 MG capsule Take 2 capsules twice a day by oral route for 90 days. 360 capsule 1    hydrALAZINE (APRESOLINE) 50 MG tablet Take 1 tablet (50 mg total) by mouth every 8 (eight) hours. 90 tablet 0    insulin aspart U-100 (NOVOLOG) 100 unit/mL (3 mL) InPn pen Inject 8 Units into the skin 3 (three) times daily with meals. 15 mL 0    insulin glargine U-100, Lantus, (LANTUS SOLOSTAR U-100 INSULIN) 100 unit/mL (3 mL) InPn pen Inject 22 units every day by subcutaneous route in the evening for 30 days, for diabetes. 15 mL 2    levETIRAcetam (KEPPRA) 500 MG Tab Take 1 tablet (500 mg total) by mouth 2 (two) times daily. 60 tablet 11    ondansetron (ZOFRAN-ODT) 4 MG TbDL Place 1 tablet (4 mg) on or under the tongue every 8 hours for 10 days. 24 tablet 2    pantoprazole (PROTONIX) 40 MG tablet Take 1 tablet (40 mg total) by mouth once daily. 30 tablet 0    prednisoLONE acetate (PRED FORTE) 1 % DrpS Place 1 drop into the left eye 2 (two) times daily. 5 mL 3    promethazine (PHENERGAN) 25 MG tablet Take 1 tablet (25 mg total) by mouth every 6 (six) hours as needed. 15 tablet 0    sevelamer carbonate (RENVELA) 800 mg Tab Take 1 tablet (800 mg total) by mouth 3 (three) times daily  "with meals. 180 tablet 11    sucralfate (CARAFATE) 1 gram tablet Take 1 tablet (1 g total) by mouth 4 (four) times daily. 100 tablet 1    atropine 1% (ISOPTO ATROPINE) 1 % Drop Place 1 drop into the left eye 2 (two) times a day. 15 mL 3    blood sugar diagnostic (TRUE METRIX GLUCOSE TEST STRIP) Strp Use 1 strip to check blood glucose three times daily 100 each 11    blood-glucose meter (TRUE METRIX GLUCOSE METER) Misc Use to check blood glucose 1 each 0    blood-glucose meter,continuous Misc USE WITH SENSORS 1 each 0    blood-glucose sensor (DEXCOM G7 SENSOR) Heather Use as directed for CGM. Change sensor every 10 days 3 each 0    blood-glucose sensor Heather CHANGE EVERY 10 DAYS 3 each 0    brimonidine 0.15 % OPTH DROP (ALPHAGAN) 0.15 % ophthalmic solution Place 1 drop into both eyes 3 (three) times daily. 15 mL 3    busPIRone (BUSPAR) 10 MG tablet Take 1 tablet (10 mg total) by mouth 3 (three) times daily as needed (anxiety). 60 tablet 5    dorzolamide-timolol 2-0.5% (COSOPT) 22.3-6.8 mg/mL ophthalmic solution place 1 drop in left eye twice a day  for 30 days 10 mL 0    lancets (TRUEPLUS LANCETS) 33 gauge Misc Use 1 to check blood glucose three times daily 100 each 11    oxyCODONE-acetaminophen (PERCOCET)  mg per tablet Take 1 tablet by mouth every 4 (four) hours as needed for Pain. 42 tablet 0    pen needle, diabetic 31 gauge x 3/16" Ndle Use as directed with insulin once daily 100 each 0    timolol maleate 0.5% (TIMOPTIC) 0.5 % Drop Place 1 drop in left eye 2 times a day for 30 days 5 mL 6    [DISCONTINUED] atenoloL (TENORMIN) 50 MG tablet Take 1 tablet (50 mg total) by mouth every other day. 45 tablet 3    [DISCONTINUED] insulin detemir U-100, Levemir, (LEVEMIR FLEXTOUCH U100 INSULIN) 100 unit/mL (3 mL) InPn pen Inject 22 units every day by subcutaneous route in the evening for 90 days. 18 mL 1    [DISCONTINUED] omeprazole (PRILOSEC) 20 MG capsule Take 2 capsules (40 mg total) by mouth once daily. for 10 days " "20 capsule 0     Scheduled Meds:   apixaban  5 mg Oral BID    epoetin teresa-epbx  50 Units/kg Intravenous Every Tues, Thurs, Sat    FLUoxetine  40 mg Oral Daily    insulin glargine U-100 (Lantus)  20 Units Subcutaneous QHS    levETIRAcetam  500 mg Oral BID    pantoprazole  40 mg Oral Daily    prednisoLONE acetate  1 drop Left Eye BID    sevelamer carbonate  800 mg Oral TID WM    sucralfate  1 g Oral QID     Continuous Infusions:  PRN Meds:.  Current Facility-Administered Medications:     acetaminophen, 650 mg, Oral, Q4H PRN    albuterol-ipratropium, 3 mL, Nebulization, Q6H PRN    aluminum-magnesium hydroxide-simethicone, 30 mL, Oral, QID PRN    dextrose 10%, 12.5 g, Intravenous, PRN    dextrose 10%, 25 g, Intravenous, PRN    glucagon (human recombinant), 1 mg, Intramuscular, PRN    glucose, 16 g, Oral, PRN    glucose, 24 g, Oral, PRN    HYDROcodone-acetaminophen, 1 tablet, Oral, Q6H PRN    insulin aspart U-100, 0-5 Units, Subcutaneous, QID (AC + HS) PRN    lorazepam, 1 mg, Intravenous, Q6H PRN    naloxone, 0.02 mg, Intravenous, PRN    promethazine (PHENERGAN) 12.5 mg in 0.9% NaCl 50 mL IVPB, 12.5 mg, Intravenous, Q6H PRN    simethicone, 1 tablet, Oral, QID PRN    sodium chloride 0.9%, 10 mL, Intravenous, Q12H PRN    Allergies:  Droperidol, Haldol [haloperidol lactate], Penicillins, and Droperidol (bulk)    Past Family History:  Reviewed; refer to Hospitalist Admission Note    Review of Systems:  Review of Systems - All 14 systems reviewed and negative, except as noted in HPI    Physical Exam:    BP (!) 153/76 (BP Location: Right leg, Patient Position: Sitting)   Pulse 99   Temp 97.9 °F (36.6 °C) (Oral)   Resp 20 Comment: Simultaneous filing. User may not have seen previous data.  Ht 5' 2" (1.575 m)   Wt 47.5 kg (104 lb 11.5 oz)   LMP  (LMP Unknown) Comment: pt states last mentral cycle was 3yrs ago  SpO2 100%   Breastfeeding No   BMI 19.15 kg/m²     General Appearance:    Alert, cooperative, no distress, " appears stated age   Head:    Normocephalic, without obvious abnormality, atraumatic   Eyes:    PER, conjunctiva/corneas clear, EOM's intact in both eyes        Throat:   Lips, mucosa, and tongue normal; teeth and gums normal   Back:     Symmetric, no curvature, ROM normal, no CVA tenderness   Lungs:     Clear to auscultation bilaterally, respirations unlabored   Chest wall:    No tenderness or deformity   Heart:    Regular rate and rhythm, S1 and S2 normal, no murmur, rub   or gallop   Abdomen:     Soft, non-tender, bowel sounds active all four quadrants,     no masses, no organomegaly   Extremities:   Extremities normal, atraumatic, no cyanosis or edema   Pulses:   2+ and symmetric all extremities   MSK:   No joint or muscle swelling, tenderness or deformity   Skin:   Skin color, texture, turgor normal, no rashes or lesions   Neurologic:   CNII-XII intact, normal strength and sensation       Throughout.  No flap     Results:  Lab Results   Component Value Date     (L) 09/22/2024    K 4.3 09/22/2024    CL 96 09/22/2024    CO2 25 09/22/2024    BUN 22 (H) 09/22/2024    CREATININE 4.5 (H) 09/22/2024    CALCIUM 8.9 09/22/2024    ANIONGAP 11 09/22/2024    ESTGFRAFRICA 19 (L) 09/03/2022    EGFRNONAA 18 (A) 07/31/2022       Lab Results   Component Value Date    CALCIUM 8.9 09/22/2024    PHOS 2.5 (L) 09/22/2024       Recent Labs   Lab 09/22/24  0508   WBC 6.25   RBC 3.31*   HGB 9.6*   HCT 28.9*      MCV 87   MCH 29.0   MCHC 33.2          I have personally reviewed pertinent radiological imaging and reports.    Geeta Kaur NP    [facility] [place]  [phone]

## 2024-09-22 NOTE — PROGRESS NOTES
[facility]  Progress Note    Patient Name: Tabby Howard  MRN: [id number]  Patient Class: OP- Observation   Admission Date: 9/19/2024  Length of Stay: 0 days  Attending Physician: Alice Fish MD  Primary Care Provider: Melony Kim NP        Subjective:     Principal Problem:High anion gap metabolic acidosis        HPI:  Tabby Howard is a 34 year old female with a previous medical history of ESRD on dialysis Tue/Thur/Sat, chronic abdominal pain, HTN, Type II Diabetes, who presented to the ED  for left-sided flank pain. HPI is limited to chart review and ED note due to patient not being cooperative.  Per chart review, patient has multiple similar episodes in the past and has had multiple previous CT scans performed.  Also has had multiple hospitalizations for DKA in the past. Patient reports last dialysis was Monday 9/9/24 due to them scheduling her that day. She was unable to attend her normal Thursday session due to severe abdominal pain and vomiting. Of note she has had 5 ER visits over the past five days and most recent admission was on 9/10/24. She was admitted for DKA and insulin drip discontinued the next day. She received dialysis along with an EGD that was negative for any acute process. Initial ED evaluation showed elevated anion gap and betahydroxybutyrate but low normal PH. Dr. Bradford consulted and he placed orders for dialysis. Pain and nausea controlled with ativan and phenergan. Patient admitted by hospital medicine for further evaluation and management.     Overview/Hospital Course:  Patient is a 34 year old patient with diabetes and ESRD on HD, frequent hospital admissions for multiple complains, chronic abdominal pain. Patient was recently discharged few days ago. Patient came back with worsening pain on the left flank, nausea and vomiting. Patient is euglycemic. However beta hydroxybutyrate was elevated. Patient was hydrated. Acidosis improved.  The patient continued to c/o abdominal pain. Prn pain meds were given.    Interval History: Still complaining of left flank pain. Acidosis has improved.    Review of Systems  Objective:     Vital Signs (Most Recent):  Temp: 97.9 °F (36.6 °C) (09/22/24 0824)  Pulse: 99 (09/22/24 0824)  Resp: 20 (Simultaneous filing. User may not have seen previous data.) (09/22/24 0824)  BP: (!) 153/76 (09/22/24 0824)  SpO2: 100 % (09/22/24 0824) Vital Signs (24h Range):  Temp:  [97.3 °F (36.3 °C)-99 °F (37.2 °C)] 97.9 °F (36.6 °C)  Pulse:  [] 99  Resp:  [16-20] 20  SpO2:  [98 %-100 %] 100 %  BP: ()/() 153/76     Weight: 47.5 kg (104 lb 11.5 oz)  Body mass index is 19.15 kg/m².    Intake/Output Summary (Last 24 hours) at 9/22/2024 1119  Last data filed at 9/22/2024 0630  Gross per 24 hour   Intake 1490 ml   Output 915 ml   Net 575 ml         Physical Exam  Vitals reviewed.   Constitutional:       Appearance: She is ill-appearing.      Comments: Chronically ill appearing   HENT:      Head: Normocephalic and atraumatic.      Nose: Nose normal.      Mouth/Throat:      Mouth: Mucous membranes are dry.      Pharynx: Oropharynx is clear.   Eyes:      Conjunctiva/sclera:      Left eye: Hemorrhage present.   Cardiovascular:      Rate and Rhythm: Regular rhythm.      Pulses:           Radial pulses are 2+ on the right side and 2+ on the left side.        Dorsalis pedis pulses are 2+ on the right side and 2+ on the left side.      Arteriovenous access: Left arteriovenous access is present.  Pulmonary:      Effort: Pulmonary effort is normal.      Breath sounds: Normal breath sounds.   Abdominal:      General: Bowel sounds are normal.      Palpations: Abdomen is soft.      Tenderness: There is generalized abdominal tenderness.   Musculoskeletal:         General: Normal range of motion.      Cervical back: Normal range of motion and neck supple.      Right lower leg: No edema.      Left lower leg: No edema.   Skin:     General:  Skin is warm and dry.      Capillary Refill: Capillary refill takes less than 2 seconds.   Neurological:      General: No focal deficit present.      Mental Status: She is alert and oriented to person, place, and time. Mental status is at baseline.   Psychiatric:         Attention and Perception: Attention normal.         Behavior: Behavior is withdrawn       Significant Labs: All pertinent labs within the past 24 hours have been reviewed.  CBC:   Recent Labs   Lab 09/21/24  0534 09/22/24  0508   WBC 5.96 6.25   HGB 9.9* 9.6*   HCT 29.6* 28.9*    154     CMP:   Recent Labs   Lab 09/21/24  0534 09/22/24  0508    132*   K 4.1 4.3    96   CO2 25 25    173*   BUN 24* 22*   CREATININE 6.4* 4.5*   CALCIUM 9.5 8.9   PROT 6.7 6.6   ALBUMIN 3.1* 3.0*   BILITOT 0.9 0.6   ALKPHOS 79 85   AST 14 14   ALT 9* 10   ANIONGAP 12 11       Significant Imaging: I have reviewed all pertinent imaging results/findings within the past 24 hours.  Physical Exam    Assessment/Plan:      * High anion gap metabolic acidosis  Improved          Chronic abdominal pain  Abdominal pain and nausea controlled with ativan and phenergan.       ESRD (end stage renal disease) on dialysis  Creatine stable for now. BMP reviewed- noted Estimated Creatinine Clearance: 9.2 mL/min (A) (based on SCr of 6.4 mg/dL (H)). according to latest data. Based on current GFR, CKD stage is end stage.  Monitor UOP and serial BMP and adjust therapy as needed. Renally dose meds. Avoid nephrotoxic medications and procedures.    Last full dialysis session on Monday 9/16/24. Patient normally Tue/Thur/Sat. Missed appointment due to abdominal pain  Dr. Figueroa placed orders for dialysis   Daily cbc, cmp, mag, phos    Diabetes  Patient's FSGs are uncontrolled due to hyperglycemia on current medication regimen.  Last A1c reviewed-   Lab Results   Component Value Date    HGBA1C 9.0 (H) 09/10/2024     Most recent fingerstick glucose reviewed-   Recent Labs   Lab  09/19/24  1827 09/20/24  0040 09/20/24  0433 09/20/24  0745   POCTGLUCOSE 304* 107 162* 187*       Current correctional scale  Low  Decrease anti-hyperglycemic dose as follows-   Antihyperglycemics (From admission, onward)      Start     Stop Route Frequency Ordered    09/19/24 2100  insulin glargine U-100 (Lantus) pen 20 Units         -- SubQ Nightly 09/19/24 1751    09/19/24 1732  insulin aspart U-100 pen 0-5 Units         -- SubQ Before meals & nightly PRN 09/19/24 1632          Hold Oral hypoglycemics while patient is in the hospital.    Seizures  -Seizure precautions  -Keppra continued BID      Hypertension  Chronic, controlled. Latest blood pressure and vitals reviewed-     Home meds for hypertension were reviewed and noted below.   Hypertension Medications               hydrALAZINE (APRESOLINE) 50 MG tablet Take 1 tablet (50 mg total) by mouth every 8 (eight) hours.            While in the hospital, will manage blood pressure as follows; Continue home antihypertensive regimen    Will utilize p.r.n. blood pressure medication only if patient's blood pressure greater than 160/100 and she develops symptoms such as worsening chest pain or shortness of breath.      VTE Risk Mitigation (From admission, onward)           Ordered     apixaban tablet 5 mg  2 times daily         09/19/24 1751     IP VTE HIGH RISK PATIENT  Once         09/19/24 1624     Place sequential compression device  Until discontinued         09/19/24 1624                    Discharge Planning   TATIANA: 9/24/2024     Code Status: Full Code   Is the patient medically ready for discharge?:     Reason for patient still in hospital (select all that apply): Patient trending condition  Discharge Plan A: Home with family                  Alice Fish MD, MD  Department of Hospital Medicine   [facility] - Med/Surg

## 2024-09-22 NOTE — NURSING
POCT checked at bedside, 494 on right hand. Rechecked on left hand, 478. JACQUELINE Tovar notified. Stat glucose lab draw ordered. Lantus 20 units and Novolog 3 units administered per order.

## 2024-09-22 NOTE — PLAN OF CARE
Problem: Hemodialysis  Goal: Safe, Effective Therapy Delivery  Outcome: Progressing  Goal: Effective Tissue Perfusion  Outcome: Progressing  Goal: Absence of Infection Signs and Symptoms  Outcome: Progressing     Problem: Adult Inpatient Plan of Care  Goal: Optimal Comfort and Wellbeing  Outcome: Progressing     Problem: Diabetes Comorbidity  Goal: Blood Glucose Level Within Targeted Range  Outcome: Progressing     Problem: Fall Injury Risk  Goal: Absence of Fall and Fall-Related Injury  Outcome: Progressing

## 2024-09-23 LAB
ALBUMIN SERPL BCP-MCNC: 3 G/DL (ref 3.5–5.2)
ALP SERPL-CCNC: 81 U/L (ref 55–135)
ALT SERPL W/O P-5'-P-CCNC: 9 U/L (ref 10–44)
ANION GAP SERPL CALC-SCNC: 13 MMOL/L (ref 8–16)
AST SERPL-CCNC: 12 U/L (ref 10–40)
BILIRUB SERPL-MCNC: 0.5 MG/DL (ref 0.1–1)
BUN SERPL-MCNC: 38 MG/DL (ref 6–20)
CALCIUM SERPL-MCNC: 9.4 MG/DL (ref 8.7–10.5)
CHLORIDE SERPL-SCNC: 96 MMOL/L (ref 95–110)
CO2 SERPL-SCNC: 21 MMOL/L (ref 23–29)
CREAT SERPL-MCNC: 7 MG/DL (ref 0.5–1.4)
ERYTHROCYTE [DISTWIDTH] IN BLOOD BY AUTOMATED COUNT: 16.5 % (ref 11.5–14.5)
EST. GFR  (NO RACE VARIABLE): 7 ML/MIN/1.73 M^2
GLUCOSE SERPL-MCNC: 174 MG/DL (ref 70–110)
HCT VFR BLD AUTO: 29.9 % (ref 37–48.5)
HGB BLD-MCNC: 10.2 G/DL (ref 12–16)
MCH RBC QN AUTO: 29.1 PG (ref 27–31)
MCHC RBC AUTO-ENTMCNC: 34.1 G/DL (ref 32–36)
MCV RBC AUTO: 85 FL (ref 82–98)
PLATELET # BLD AUTO: 167 K/UL (ref 150–450)
PMV BLD AUTO: 11.4 FL (ref 9.2–12.9)
POCT GLUCOSE: 164 MG/DL (ref 70–110)
POCT GLUCOSE: 213 MG/DL (ref 70–110)
POCT GLUCOSE: 250 MG/DL (ref 70–110)
POCT GLUCOSE: 307 MG/DL (ref 70–110)
POTASSIUM SERPL-SCNC: 4.6 MMOL/L (ref 3.5–5.1)
PROT SERPL-MCNC: 6.4 G/DL (ref 6–8.4)
RBC # BLD AUTO: 3.5 M/UL (ref 4–5.4)
SODIUM SERPL-SCNC: 130 MMOL/L (ref 136–145)
WBC # BLD AUTO: 7.48 K/UL (ref 3.9–12.7)

## 2024-09-23 PROCEDURE — 25000003 PHARM REV CODE 250: Performed by: INTERNAL MEDICINE

## 2024-09-23 PROCEDURE — 85027 COMPLETE CBC AUTOMATED: CPT

## 2024-09-23 PROCEDURE — 36415 COLL VENOUS BLD VENIPUNCTURE: CPT | Performed by: INTERNAL MEDICINE

## 2024-09-23 PROCEDURE — 36415 COLL VENOUS BLD VENIPUNCTURE: CPT

## 2024-09-23 PROCEDURE — 25000003 PHARM REV CODE 250

## 2024-09-23 PROCEDURE — 11000001 HC ACUTE MED/SURG PRIVATE ROOM

## 2024-09-23 PROCEDURE — 80053 COMPREHEN METABOLIC PANEL: CPT | Performed by: INTERNAL MEDICINE

## 2024-09-23 PROCEDURE — 99900035 HC TECH TIME PER 15 MIN (STAT)

## 2024-09-23 PROCEDURE — 94761 N-INVAS EAR/PLS OXIMETRY MLT: CPT

## 2024-09-23 PROCEDURE — 63600175 PHARM REV CODE 636 W HCPCS

## 2024-09-23 PROCEDURE — 25000003 PHARM REV CODE 250: Performed by: NURSE PRACTITIONER

## 2024-09-23 RX ORDER — HYDROCODONE BITARTRATE AND ACETAMINOPHEN 5; 325 MG/1; MG/1
1 TABLET ORAL EVERY 4 HOURS PRN
Status: DISCONTINUED | OUTPATIENT
Start: 2024-09-23 | End: 2024-09-30 | Stop reason: HOSPADM

## 2024-09-23 RX ORDER — METOPROLOL TARTRATE 1 MG/ML
5 INJECTION, SOLUTION INTRAVENOUS ONCE
Status: COMPLETED | OUTPATIENT
Start: 2024-09-23 | End: 2024-09-23

## 2024-09-23 RX ADMIN — APIXABAN 5 MG: 2.5 TABLET, FILM COATED ORAL at 09:09

## 2024-09-23 RX ADMIN — HYDRALAZINE HYDROCHLORIDE 25 MG: 25 TABLET ORAL at 06:09

## 2024-09-23 RX ADMIN — LORAZEPAM 1 MG: 2 INJECTION INTRAMUSCULAR; INTRAVENOUS at 08:09

## 2024-09-23 RX ADMIN — METOPROLOL TARTRATE 5 MG: 5 INJECTION INTRAVENOUS at 06:09

## 2024-09-23 RX ADMIN — SEVELAMER CARBONATE 800 MG: 800 TABLET, FILM COATED ORAL at 05:09

## 2024-09-23 RX ADMIN — APIXABAN 5 MG: 2.5 TABLET, FILM COATED ORAL at 08:09

## 2024-09-23 RX ADMIN — PREDNISOLONE ACETATE 1 DROP: 10 SUSPENSION/ DROPS OPHTHALMIC at 09:09

## 2024-09-23 RX ADMIN — HYDROCODONE BITARTRATE AND ACETAMINOPHEN 1 TABLET: 5; 325 TABLET ORAL at 08:09

## 2024-09-23 RX ADMIN — SUCRALFATE 1 G: 1 TABLET ORAL at 05:09

## 2024-09-23 RX ADMIN — INSULIN ASPART 4 UNITS: 100 INJECTION, SOLUTION INTRAVENOUS; SUBCUTANEOUS at 11:09

## 2024-09-23 RX ADMIN — INSULIN ASPART 4 UNITS: 100 INJECTION, SOLUTION INTRAVENOUS; SUBCUTANEOUS at 09:09

## 2024-09-23 RX ADMIN — INSULIN GLARGINE 20 UNITS: 100 INJECTION, SOLUTION SUBCUTANEOUS at 09:09

## 2024-09-23 RX ADMIN — SEVELAMER CARBONATE 800 MG: 800 TABLET, FILM COATED ORAL at 08:09

## 2024-09-23 RX ADMIN — HYDROCODONE BITARTRATE AND ACETAMINOPHEN 1 TABLET: 5; 325 TABLET ORAL at 09:09

## 2024-09-23 RX ADMIN — INSULIN ASPART 2 UNITS: 100 INJECTION, SOLUTION INTRAVENOUS; SUBCUTANEOUS at 08:09

## 2024-09-23 RX ADMIN — INSULIN ASPART 4 UNITS: 100 INJECTION, SOLUTION INTRAVENOUS; SUBCUTANEOUS at 04:09

## 2024-09-23 RX ADMIN — SEVELAMER CARBONATE 800 MG: 800 TABLET, FILM COATED ORAL at 11:09

## 2024-09-23 RX ADMIN — PANTOPRAZOLE SODIUM 40 MG: 40 TABLET, DELAYED RELEASE ORAL at 08:09

## 2024-09-23 RX ADMIN — PREDNISOLONE ACETATE 1 DROP: 10 SUSPENSION/ DROPS OPHTHALMIC at 08:09

## 2024-09-23 RX ADMIN — LORAZEPAM 1 MG: 2 INJECTION INTRAMUSCULAR; INTRAVENOUS at 05:09

## 2024-09-23 RX ADMIN — HYDRALAZINE HYDROCHLORIDE 25 MG: 25 TABLET ORAL at 01:09

## 2024-09-23 RX ADMIN — HYDROCODONE BITARTRATE AND ACETAMINOPHEN 1 TABLET: 5; 325 TABLET ORAL at 01:09

## 2024-09-23 RX ADMIN — FLUOXETINE HYDROCHLORIDE 40 MG: 20 CAPSULE ORAL at 08:09

## 2024-09-23 RX ADMIN — LEVETIRACETAM 500 MG: 250 TABLET, FILM COATED ORAL at 09:09

## 2024-09-23 RX ADMIN — HYDRALAZINE HYDROCHLORIDE 25 MG: 25 TABLET ORAL at 09:09

## 2024-09-23 RX ADMIN — LEVETIRACETAM 500 MG: 250 TABLET, FILM COATED ORAL at 08:09

## 2024-09-23 RX ADMIN — SUCRALFATE 1 G: 1 TABLET ORAL at 09:09

## 2024-09-23 RX ADMIN — SUCRALFATE 1 G: 1 TABLET ORAL at 12:09

## 2024-09-23 RX ADMIN — SUCRALFATE 1 G: 1 TABLET ORAL at 08:09

## 2024-09-23 NOTE — PLAN OF CARE
Problem: Hemodialysis  Goal: Safe, Effective Therapy Delivery  Outcome: Progressing  Goal: Effective Tissue Perfusion  Outcome: Progressing     Problem: Adult Inpatient Plan of Care  Goal: Optimal Comfort and Wellbeing  Outcome: Progressing     Problem: Diabetes Comorbidity  Goal: Blood Glucose Level Within Targeted Range  Outcome: Progressing     Problem: Fall Injury Risk  Goal: Absence of Fall and Fall-Related Injury  Outcome: Progressing

## 2024-09-23 NOTE — CARE UPDATE
09/22/24 2056   Patient Assessment/Suction   Level of Consciousness (AVPU) alert   Respiratory Effort Unlabored   Expansion/Accessory Muscles/Retractions no use of accessory muscles   Rhythm/Pattern, Respiratory unlabored;pattern regular   Cough Frequency no cough   PRE-TX-O2   Device (Oxygen Therapy) room air   SpO2 100 %   Pulse Oximetry Type Intermittent   $ Pulse Oximetry - Multiple Charge Pulse Oximetry - Multiple   Pulse 97   Resp 18   Aerosol Therapy   $ Aerosol Therapy Charges PRN treatment not required   Respiratory Treatment Status (SVN) PRN treatment not required

## 2024-09-23 NOTE — PLAN OF CARE
Problem: Adult Inpatient Plan of Care  Goal: Plan of Care Review  Outcome: Progressing     Problem: Adult Inpatient Plan of Care  Goal: Optimal Comfort and Wellbeing  Outcome: Progressing     Pt rested in bed this shift. Up to bedside commode independently. Complaints of pain or anxiety managed with prn medication. NSR on telemetry. Fair appetite. BG monitored closely, coverage provided per sliding scale as needed. Safety maintained throughout shift. Needs attended to.

## 2024-09-23 NOTE — PROGRESS NOTES
[facility]  Progress Note    Patient Name: Tabby Howard  MRN: [id number]  Patient Class: IP- Inpatient   Admission Date: 9/19/2024  Length of Stay: 0 days  Attending Physician: Alice Fish MD  Primary Care Provider: Melony Kim NP        Subjective:     Principal Problem:High anion gap metabolic acidosis        HPI:  Tabby Howard is a 34 year old female with a previous medical history of ESRD on dialysis Tue/Thur/Sat, chronic abdominal pain, HTN, Type II Diabetes, who presented to the ED  for left-sided flank pain. HPI is limited to chart review and ED note due to patient not being cooperative.  Per chart review, patient has multiple similar episodes in the past and has had multiple previous CT scans performed.  Also has had multiple hospitalizations for DKA in the past. Patient reports last dialysis was Monday 9/9/24 due to them scheduling her that day. She was unable to attend her normal Thursday session due to severe abdominal pain and vomiting. Of note she has had 5 ER visits over the past five days and most recent admission was on 9/10/24. She was admitted for DKA and insulin drip discontinued the next day. She received dialysis along with an EGD that was negative for any acute process. Initial ED evaluation showed elevated anion gap and betahydroxybutyrate but low normal PH. Dr. Bradford consulted and he placed orders for dialysis. Pain and nausea controlled with ativan and phenergan. Patient admitted by hospital medicine for further evaluation and management.     Overview/Hospital Course:  Patient is a 34 year old patient with diabetes and ESRD on HD, frequent hospital admissions for multiple complains, chronic abdominal pain. Patient was recently discharged few days ago. Patient came back with worsening pain on the left flank, nausea and vomiting. Patient is euglycemic. However beta hydroxybutyrate was elevated. Patient was hydrated. Acidosis improved.  The patient continued to c/o abdominal pain. Prn pain meds were given.    Interval History: Still complaining of left flank pain. Acidosis has improved.    Review of Systems  Objective:     Vital Signs (Most Recent):  Temp: 99 °F (37.2 °C) (09/23/24 0805)  Pulse: 98 (09/23/24 0837)  Resp: 18 (09/23/24 0837)  BP: (!) 168/104 (09/23/24 0805)  SpO2: 97 % (09/23/24 0837) Vital Signs (24h Range):  Temp:  [97.2 °F (36.2 °C)-99 °F (37.2 °C)] 99 °F (37.2 °C)  Pulse:  [] 98  Resp:  [16-18] 18  SpO2:  [97 %-100 %] 97 %  BP: (123-178)/() 168/104     Weight: 47.5 kg (104 lb 11.5 oz)  Body mass index is 19.15 kg/m².    Intake/Output Summary (Last 24 hours) at 9/23/2024 1252  Last data filed at 9/23/2024 0630  Gross per 24 hour   Intake 270 ml   Output 0 ml   Net 270 ml         Physical Exam  Vitals reviewed.   Constitutional:       Appearance: She is ill-appearing.      Comments: Chronically ill appearing   HENT:      Head: Normocephalic and atraumatic.      Nose: Nose normal.      Mouth/Throat:      Mouth: Mucous membranes are dry.      Pharynx: Oropharynx is clear.   Eyes:      Conjunctiva/sclera:      Left eye: Hemorrhage present.   Cardiovascular:      Rate and Rhythm: Regular rhythm.      Pulses:           Radial pulses are 2+ on the right side and 2+ on the left side.        Dorsalis pedis pulses are 2+ on the right side and 2+ on the left side.      Arteriovenous access: Left arteriovenous access is present.  Pulmonary:      Effort: Pulmonary effort is normal.      Breath sounds: Normal breath sounds.   Abdominal:      General: Bowel sounds are normal.      Palpations: Abdomen is soft.      Tenderness: There is generalized abdominal tenderness.   Musculoskeletal:         General: Normal range of motion.      Cervical back: Normal range of motion and neck supple.      Right lower leg: No edema.      Left lower leg: No edema.   Skin:     General: Skin is warm and dry.      Capillary Refill: Capillary refill  takes less than 2 seconds.   Neurological:      General: No focal deficit present.      Mental Status: She is alert and oriented to person, place, and time. Mental status is at baseline.   Psychiatric:         Attention and Perception: Attention normal.         Behavior: Behavior is withdrawn       Significant Labs: All pertinent labs within the past 24 hours have been reviewed.  CBC:   Recent Labs   Lab 09/22/24  0508 09/23/24  0410   WBC 6.25 7.48   HGB 9.6* 10.2*   HCT 28.9* 29.9*    167     CMP:   Recent Labs   Lab 09/22/24  0508 09/22/24  2109 09/23/24  0410   *  --  130*   K 4.3  --  4.6   CL 96  --  96   CO2 25  --  21*   * 515* 174*   BUN 22*  --  38*   CREATININE 4.5*  --  7.0*   CALCIUM 8.9  --  9.4   PROT 6.6  --  6.4   ALBUMIN 3.0*  --  3.0*   BILITOT 0.6  --  0.5   ALKPHOS 85  --  81   AST 14  --  12   ALT 10  --  9*   ANIONGAP 11  --  13       Significant Imaging: I have reviewed all pertinent imaging results/findings within the past 24 hours.  Physical Exam    Assessment/Plan:      * High anion gap metabolic acidosis  Improved          Chronic abdominal pain  Prn pain meds and nausea meds      ESRD (end stage renal disease) on dialysis  Creatine stable for now. BMP reviewed- noted Estimated Creatinine Clearance: 9.2 mL/min (A) (based on SCr of 6.4 mg/dL (H)). according to latest data. Based on current GFR, CKD stage is end stage.  Monitor UOP and serial BMP and adjust therapy as needed. Renally dose meds. Avoid nephrotoxic medications and procedures.    Last full dialysis session on Monday 9/16/24. Patient normally Tue/Thur/Sat. Missed appointment due to abdominal pain  Dr. Figueroa placed orders for dialysis   Daily cbc, cmp, mag, phos    Diabetes  Patient's FSGs are uncontrolled due to hyperglycemia on current medication regimen.  Last A1c reviewed-   Lab Results   Component Value Date    HGBA1C 9.0 (H) 09/10/2024     Most recent fingerstick glucose reviewed-   Recent Labs   Lab  09/19/24  1827 09/20/24  0040 09/20/24  0433 09/20/24  0745   POCTGLUCOSE 304* 107 162* 187*       Current correctional scale  Low  Decrease anti-hyperglycemic dose as follows-   Antihyperglycemics (From admission, onward)      Start     Stop Route Frequency Ordered    09/19/24 2100  insulin glargine U-100 (Lantus) pen 20 Units         -- SubQ Nightly 09/19/24 1751    09/19/24 1732  insulin aspart U-100 pen 0-5 Units         -- SubQ Before meals & nightly PRN 09/19/24 1632          Hold Oral hypoglycemics while patient is in the hospital.    Seizures  -Seizure precautions  -Keppra continued BID      Hypertension  Chronic, controlled. Latest blood pressure and vitals reviewed-     Home meds for hypertension were reviewed and noted below.   Hypertension Medications               hydrALAZINE (APRESOLINE) 50 MG tablet Take 1 tablet (50 mg total) by mouth every 8 (eight) hours.            While in the hospital, will manage blood pressure as follows; Continue home antihypertensive regimen    Will utilize p.r.n. blood pressure medication only if patient's blood pressure greater than 160/100 and she develops symptoms such as worsening chest pain or shortness of breath.      VTE Risk Mitigation (From admission, onward)           Ordered     apixaban tablet 5 mg  2 times daily         09/19/24 1751     IP VTE HIGH RISK PATIENT  Once         09/19/24 1624     Place sequential compression device  Until discontinued         09/19/24 1624                    Discharge Planning   TATIANA: 9/25/2024     Code Status: Full Code   Is the patient medically ready for discharge?:     Reason for patient still in hospital (select all that apply): Patient trending condition  Discharge Plan A: Home with family                  Alice Fish MD, MD  Department of Hospital Medicine   formerly [facility] - Med/Surg

## 2024-09-23 NOTE — SUBJECTIVE & OBJECTIVE
Interval History: Still complaining of left flank pain. Acidosis has improved.    Review of Systems  Objective:     Vital Signs (Most Recent):  Temp: 99 °F (37.2 °C) (09/23/24 0805)  Pulse: 98 (09/23/24 0837)  Resp: 18 (09/23/24 0837)  BP: (!) 168/104 (09/23/24 0805)  SpO2: 97 % (09/23/24 0837) Vital Signs (24h Range):  Temp:  [97.2 °F (36.2 °C)-99 °F (37.2 °C)] 99 °F (37.2 °C)  Pulse:  [] 98  Resp:  [16-18] 18  SpO2:  [97 %-100 %] 97 %  BP: (123-178)/() 168/104     Weight: 47.5 kg (104 lb 11.5 oz)  Body mass index is 19.15 kg/m².    Intake/Output Summary (Last 24 hours) at 9/23/2024 1252  Last data filed at 9/23/2024 0630  Gross per 24 hour   Intake 270 ml   Output 0 ml   Net 270 ml         Physical Exam  Vitals reviewed.   Constitutional:       Appearance: She is ill-appearing.      Comments: Chronically ill appearing   HENT:      Head: Normocephalic and atraumatic.      Nose: Nose normal.      Mouth/Throat:      Mouth: Mucous membranes are dry.      Pharynx: Oropharynx is clear.   Eyes:      Conjunctiva/sclera:      Left eye: Hemorrhage present.   Cardiovascular:      Rate and Rhythm: Regular rhythm.      Pulses:           Radial pulses are 2+ on the right side and 2+ on the left side.        Dorsalis pedis pulses are 2+ on the right side and 2+ on the left side.      Arteriovenous access: Left arteriovenous access is present.  Pulmonary:      Effort: Pulmonary effort is normal.      Breath sounds: Normal breath sounds.   Abdominal:      General: Bowel sounds are normal.      Palpations: Abdomen is soft.      Tenderness: There is generalized abdominal tenderness.   Musculoskeletal:         General: Normal range of motion.      Cervical back: Normal range of motion and neck supple.      Right lower leg: No edema.      Left lower leg: No edema.   Skin:     General: Skin is warm and dry.      Capillary Refill: Capillary refill takes less than 2 seconds.   Neurological:      General: No focal deficit  present.      Mental Status: She is alert and oriented to person, place, and time. Mental status is at baseline.   Psychiatric:         Attention and Perception: Attention normal.         Behavior: Behavior is withdrawn       Significant Labs: All pertinent labs within the past 24 hours have been reviewed.  CBC:   Recent Labs   Lab 09/22/24  0508 09/23/24  0410   WBC 6.25 7.48   HGB 9.6* 10.2*   HCT 28.9* 29.9*    167     CMP:   Recent Labs   Lab 09/22/24  0508 09/22/24  2109 09/23/24  0410   *  --  130*   K 4.3  --  4.6   CL 96  --  96   CO2 25  --  21*   * 515* 174*   BUN 22*  --  38*   CREATININE 4.5*  --  7.0*   CALCIUM 8.9  --  9.4   PROT 6.6  --  6.4   ALBUMIN 3.0*  --  3.0*   BILITOT 0.6  --  0.5   ALKPHOS 85  --  81   AST 14  --  12   ALT 10  --  9*   ANIONGAP 11  --  13       Significant Imaging: I have reviewed all pertinent imaging results/findings within the past 24 hours.  Physical Exam

## 2024-09-23 NOTE — PROGRESS NOTES
INPATIENT NEPHROLOGY progress  [facility] NEPHROLOGY    Tabby Howard  09/23/2024    Reason for consultation:  esrd    Chief Complaint:   Chief Complaint   Patient presents with    Flank Pain     Left side flank pain, N&V starting this morning     History of Present Illness:    Per H and P  Tabby Howard is a 34 year old female with a previous medical history of ESRD on dialysis Tue/Thur/Sat, chronic abdominal pain, HTN, Type II Diabetes, who presented to the ED  for left-sided flank pain. HPI is limited to chart review and ED note due to patient not being cooperative.  Per chart review, patient has multiple similar episodes in the past and has had multiple previous CT scans performed.  Also has had multiple hospitalizations for DKA in the past. Patient reports last dialysis was Monday 9/9/24 due to them scheduling her that day. She was unable to attend her normal Thursday session due to severe abdominal pain and vomiting. Of note she has had 5 ER visits over the past five days and most recent admission was on 9/10/24. She was admitted for DKA and insulin drip discontinued the next day. She received dialysis along with an EGD that showed mild gastritis. Initial ED evaluation showed elevated anion gap and betahydroxybutyrate but low normal PH.      9/21  pressures low, but stable.  Lab results reviewed.  Seen today on dialysis.  Has hypotension, but alert and oriented.    9/22  pressures actually high now--will resume home hydralazine at low dose.  Lab results reviewed.  Still w/same pain, no other complaints.  0/23 VSS. Plan HD tomorrow or PRN.    Plan of Care:    ESRD on HD TTS  Hyponatremia  Acidosis  --continue dialysis per routine  --renal dose medication per routine  --pt with 17 hospital admissions in last 12 months    Anemia of CKD  --erythropoiesis stimulating agent with renal replacement therapy    Secondary HPT  --renal diet as tolerated  --continue binders with meals if tolerated    Hypertension  --uf  with hd as tolerated  --low salt diet as tolerated  --continue home medication    DM  Gastroparesis   --management per primary team    Thank you for allowing us to participate in this patient's care. We will continue to follow.    Vital Signs:  Temp Readings from Last 3 Encounters:   24 99 °F (37.2 °C)   24 98.4 °F (36.9 °C) (Oral)   24 98 °F (36.7 °C)       Pulse Readings from Last 3 Encounters:   24 98   24 94   24 92       BP Readings from Last 3 Encounters:   24 (!) 168/104   24 122/75   24 (!) 180/99       Weight:  Wt Readings from Last 3 Encounters:   24 47.5 kg (104 lb 11.5 oz)   24 52.6 kg (116 lb)   24 52.6 kg (116 lb)       Past Medical & Surgical History:  Past Medical History:   Diagnosis Date    ESRD on hemodialysis 2022    Gastritis 2022    EGD was 21    Gastroparesis 2022    has not had Emptying study    Heart failure with preserved ejection fraction 2022    EF 55% on 3/22    History of supraventricular tachycardia     Hyperkalemia 2022    Hypertensive emergency 2022    Sickle cell trait 2022    Type 2 diabetes mellitus        Past Surgical History:   Procedure Laterality Date     SECTION      x 3    COLONOSCOPY      COLONOSCOPY N/A 2022    Procedure: COLONOSCOPY;  Surgeon: Jagdeep Cedeno MD;  Location: [facility];  Service: Endoscopy;  Laterality: N/A;    DESTRUCTION, CILIARY BODY, USING LASER Left 2024    Procedure: Cyclophotocoagulation G-probe, retrobulbar chlorpromazine left eye;  Surgeon: Fidel Wise MD;  Location: [address];  Service: Ophthalmology;  Laterality: Left;    ENDOSCOPIC ULTRASOUND OF UPPER GASTROINTESTINAL TRACT N/A 2023    Procedure: ULTRASOUND, UPPER GI TRACT, ENDOSCOPIC;  Surgeon: Micky Paredes III, MD;  Location: [facility];  Service: Endoscopy;  Laterality: N/A;    ESOPHAGOGASTRODUODENOSCOPY N/A 10/18/2019    Procedure:  ESOPHAGOGASTRODUODENOSCOPY (EGD);  Surgeon: Gianluca Mendez MD;  Location: [facility] ENDO;  Service: Endoscopy;  Laterality: N/A;    ESOPHAGOGASTRODUODENOSCOPY N/A 08/24/2022    Procedure: EGD (ESOPHAGOGASTRODUODENOSCOPY);  Surgeon: Micky Paredes III, MD;  Location: [facility] ENDO;  Service: Endoscopy;  Laterality: N/A;    ESOPHAGOGASTRODUODENOSCOPY N/A 12/05/2022    Procedure: EGD (ESOPHAGOGASTRODUODENOSCOPY);  Surgeon: Marcelo Zhong MD;  Location: [facility];  Service: Endoscopy;  Laterality: N/A;    ESOPHAGOGASTRODUODENOSCOPY N/A 9/13/2024    Procedure: EGD (ESOPHAGOGASTRODUODENOSCOPY);  Surgeon: Marcelo Zhong MD;  Location: [facility] ENDO;  Service: Endoscopy;  Laterality: N/A;    EYE SURGERY      INSERTION, CATHETER, TUNNELED N/A 06/17/2023    Procedure: Insertion,catheter,tunneled;  Surgeon: Carlos Thurman Jr., MD;  Location: [facility] OR;  Service: General;  Laterality: N/A;    LAPAROSCOPIC CHOLECYSTECTOMY N/A 07/30/2022    Procedure: CHOLECYSTECTOMY, LAPAROSCOPIC;  Surgeon: Grey Perez MD;  Location: [facility] OR;  Service: General;  Laterality: N/A;    PLACEMENT OF DUAL-LUMEN VASCULAR CATHETER Left 07/12/2022    Procedure: INSERTION-CATHETER-JOSEPH;  Surgeon: Dionte Gan MD;  Location: [facility] OR;  Service: General;  Laterality: Left;    PLACEMENT OF DUAL-LUMEN VASCULAR CATHETER Right 07/26/2022    Procedure: INSERTION-CATHETER-Hemosplit;  Surgeon: Dionte Gan MD;  Location: [facility] OR;  Service: General;  Laterality: Right;       Past Social History:  Social History     Socioeconomic History    Marital status:    Tobacco Use    Smoking status: Never    Smokeless tobacco: Never   Substance and Sexual Activity    Alcohol use: Not Currently    Drug use: No    Sexual activity: Not Currently     Partners: Male     Birth control/protection: I.U.D.     Social Determinants of Health     Financial Resource Strain: Low Risk  (8/29/2024)    Overall Financial Resource Strain (CARDIA)     Difficulty of  Paying Living Expenses: Not hard at all   Recent Concern: Financial Resource Strain - Medium Risk (8/25/2024)    Overall Financial Resource Strain (CARDIA)     Difficulty of Paying Living Expenses: Somewhat hard   Food Insecurity: No Food Insecurity (8/29/2024)    Hunger Vital Sign     Worried About Running Out of Food in the Last Year: Never true     Ran Out of Food in the Last Year: Never true   Recent Concern: Food Insecurity - Food Insecurity Present (8/25/2024)    Hunger Vital Sign     Worried About Running Out of Food in the Last Year: Often true     Ran Out of Food in the Last Year: Often true   Transportation Needs: No Transportation Needs (8/29/2024)    TRANSPORTATION NEEDS     Transportation : No   Physical Activity: Sufficiently Active (8/29/2024)    Exercise Vital Sign     Days of Exercise per Week: 5 days     Minutes of Exercise per Session: 30 min   Recent Concern: Physical Activity - Inactive (8/25/2024)    Exercise Vital Sign     Days of Exercise per Week: 0 days     Minutes of Exercise per Session: 0 min   Stress: No Stress Concern Present (8/29/2024)    [employer] - Occupational Stress Questionnaire     Feeling of Stress : Not at all   Recent Concern: Stress - Stress Concern Present (8/25/2024)    [employer] - Occupational Stress Questionnaire     Feeling of Stress : Rather much   Housing Stability: Low Risk  (8/29/2024)    Housing Stability Vital Sign     Unable to Pay for Housing in the Last Year: No     Homeless in the Last Year: No   Recent Concern: Housing Stability - High Risk (8/25/2024)    Housing Stability Vital Sign     Unable to Pay for Housing in the Last Year: Yes     Homeless in the Last Year: No       Medications:  No current facility-administered medications on file prior to encounter.     Current Outpatient Medications on File Prior to Encounter   Medication Sig Dispense Refill    albuterol (VENTOLIN HFA) 90 mcg/actuation  inhaler Inhale 2 puffs every 4 hours by inhalation route. 8 g 2    apixaban (ELIQUIS) 5 mg Tab Take 1 tablet (5 mg total) by mouth 2 (two) times daily. Patient w/ thrombocytopenia <50, so held during admission, follow-up with hematologist about restarting 180 tablet 1    brinzolamide (AZOPT) 1 % ophthalmic suspension Place1 drop in left eye 3 times a day for 30 days 15 mL 6    cetirizine (ZYRTEC) 10 MG tablet Take 1 tablet every day by oral route. 30 tablet 0    FLUoxetine 40 MG capsule Take 1 capsule every day by oral route. 30 capsule 2    fluticasone propionate (FLONASE ALLERGY RELIEF) 50 mcg/actuation nasal spray [place] 1 spray every day by intranasal route. 16 g 2    gabapentin (NEURONTIN) 300 MG capsule Take 2 capsules twice a day by oral route for 90 days. 360 capsule 1    hydrALAZINE (APRESOLINE) 50 MG tablet Take 1 tablet (50 mg total) by mouth every 8 (eight) hours. 90 tablet 0    insulin aspart U-100 (NOVOLOG) 100 unit/mL (3 mL) InPn pen Inject 8 Units into the skin 3 (three) times daily with meals. 15 mL 0    insulin glargine U-100, Lantus, (LANTUS SOLOSTAR U-100 INSULIN) 100 unit/mL (3 mL) InPn pen Inject 22 units every day by subcutaneous route in the evening for 30 days, for diabetes. 15 mL 2    levETIRAcetam (KEPPRA) 500 MG Tab Take 1 tablet (500 mg total) by mouth 2 (two) times daily. 60 tablet 11    ondansetron (ZOFRAN-ODT) 4 MG TbDL Place 1 tablet (4 mg) on or under the tongue every 8 hours for 10 days. 24 tablet 2    pantoprazole (PROTONIX) 40 MG tablet Take 1 tablet (40 mg total) by mouth once daily. 30 tablet 0    prednisoLONE acetate (PRED FORTE) 1 % DrpS Place 1 drop into the left eye 2 (two) times daily. 5 mL 3    promethazine (PHENERGAN) 25 MG tablet Take 1 tablet (25 mg total) by mouth every 6 (six) hours as needed. 15 tablet 0    sevelamer carbonate (RENVELA) 800 mg Tab Take 1 tablet (800 mg total) by mouth 3 (three) times daily with meals. 180 tablet 11    sucralfate (CARAFATE) 1 gram  "tablet Take 1 tablet (1 g total) by mouth 4 (four) times daily. 100 tablet 1    atropine 1% (ISOPTO ATROPINE) 1 % Drop Place 1 drop into the left eye 2 (two) times a day. 15 mL 3    blood sugar diagnostic (TRUE METRIX GLUCOSE TEST STRIP) Strp Use 1 strip to check blood glucose three times daily 100 each 11    blood-glucose meter (TRUE METRIX GLUCOSE METER) Misc Use to check blood glucose 1 each 0    blood-glucose meter,continuous Misc USE WITH SENSORS 1 each 0    blood-glucose sensor (DEXCOM G7 SENSOR) Heather Use as directed for CGM. Change sensor every 10 days 3 each 0    blood-glucose sensor Heather CHANGE EVERY 10 DAYS 3 each 0    brimonidine 0.15 % OPTH DROP (ALPHAGAN) 0.15 % ophthalmic solution Place 1 drop into both eyes 3 (three) times daily. 15 mL 3    busPIRone (BUSPAR) 10 MG tablet Take 1 tablet (10 mg total) by mouth 3 (three) times daily as needed (anxiety). 60 tablet 5    dorzolamide-timolol 2-0.5% (COSOPT) 22.3-6.8 mg/mL ophthalmic solution place 1 drop in left eye twice a day  for 30 days 10 mL 0    lancets (TRUEPLUS LANCETS) 33 gauge Misc Use 1 to check blood glucose three times daily 100 each 11    oxyCODONE-acetaminophen (PERCOCET)  mg per tablet Take 1 tablet by mouth every 4 (four) hours as needed for Pain. 42 tablet 0    pen needle, diabetic 31 gauge x 3/16" Ndle Use as directed with insulin once daily 100 each 0    timolol maleate 0.5% (TIMOPTIC) 0.5 % Drop Place 1 drop in left eye 2 times a day for 30 days 5 mL 6    [DISCONTINUED] atenoloL (TENORMIN) 50 MG tablet Take 1 tablet (50 mg total) by mouth every other day. 45 tablet 3    [DISCONTINUED] insulin detemir U-100, Levemir, (LEVEMIR FLEXTOUCH U100 INSULIN) 100 unit/mL (3 mL) InPn pen Inject 22 units every day by subcutaneous route in the evening for 90 days. 18 mL 1    [DISCONTINUED] omeprazole (PRILOSEC) 20 MG capsule Take 2 capsules (40 mg total) by mouth once daily. for 10 days 20 capsule 0     Scheduled Meds:   apixaban  5 mg Oral BID " "   epoetin teresa-epbx  50 Units/kg Intravenous Every Tues, Thurs, Sat    FLUoxetine  40 mg Oral Daily    hydrALAZINE  25 mg Oral Q8H    insulin glargine U-100 (Lantus)  20 Units Subcutaneous QHS    levETIRAcetam  500 mg Oral BID    pantoprazole  40 mg Oral Daily    prednisoLONE acetate  1 drop Left Eye BID    sevelamer carbonate  800 mg Oral TID WM    sucralfate  1 g Oral QID     Continuous Infusions:  PRN Meds:.  Current Facility-Administered Medications:     acetaminophen, 650 mg, Oral, Q4H PRN    albuterol-ipratropium, 3 mL, Nebulization, Q6H PRN    aluminum-magnesium hydroxide-simethicone, 30 mL, Oral, QID PRN    dextrose 10%, 12.5 g, Intravenous, PRN    dextrose 10%, 25 g, Intravenous, PRN    HYDROcodone-acetaminophen, 1 tablet, Oral, Q6H PRN    insulin aspart U-100, 0-10 Units, Subcutaneous, QID (AC + HS) PRN    lorazepam, 1 mg, Intravenous, Q6H PRN    naloxone, 0.02 mg, Intravenous, PRN    promethazine (PHENERGAN) 12.5 mg in 0.9% NaCl 50 mL IVPB, 12.5 mg, Intravenous, Q6H PRN    simethicone, 1 tablet, Oral, QID PRN    sodium chloride 0.9%, 10 mL, Intravenous, Q12H PRN    Allergies:  Droperidol, Haldol [haloperidol lactate], Penicillins, and Droperidol (bulk)    Past Family History:  Reviewed; refer to Hospitalist Admission Note    Review of Systems:  Review of Systems - All 14 systems reviewed and negative, except as noted in HPI    Physical Exam:    BP (!) 168/104   Pulse 98   Temp 99 °F (37.2 °C)   Resp 18   Ht 5' 2" (1.575 m)   Wt 47.5 kg (104 lb 11.5 oz)   LMP  (LMP Unknown) Comment: pt states last mentral cycle was 3yrs ago  SpO2 97%   Breastfeeding No   BMI 19.15 kg/m²     General Appearance:    Alert, cooperative, no distress, appears stated age   Head:    Normocephalic, without obvious abnormality, atraumatic   Eyes:    PER, conjunctiva/corneas clear, EOM's intact in both eyes        Throat:   Lips, mucosa, and tongue normal; teeth and gums normal   Back:     Symmetric, no curvature, ROM " normal, no CVA tenderness   Lungs:     Clear to auscultation bilaterally, respirations unlabored   Chest wall:    No tenderness or deformity   Heart:    Regular rate and rhythm, S1 and S2 normal, no murmur, rub   or gallop   Abdomen:     Soft, non-tender, bowel sounds active all four quadrants,     no masses, no organomegaly   Extremities:   Extremities normal, atraumatic, no cyanosis or edema   Pulses:   2+ and symmetric all extremities   MSK:   No joint or muscle swelling, tenderness or deformity   Skin:   Skin color, texture, turgor normal, no rashes or lesions   Neurologic:   CNII-XII intact, normal strength and sensation       Throughout.  No flap     Results:  Lab Results   Component Value Date     (L) 09/23/2024    K 4.6 09/23/2024    CL 96 09/23/2024    CO2 21 (L) 09/23/2024    BUN 38 (H) 09/23/2024    CREATININE 7.0 (H) 09/23/2024    CALCIUM 9.4 09/23/2024    ANIONGAP 13 09/23/2024    ESTGFRAFRICA 19 (L) 09/03/2022    EGFRNONAA 18 (A) 07/31/2022       Lab Results   Component Value Date    CALCIUM 9.4 09/23/2024    PHOS 2.5 (L) 09/22/2024       Recent Labs   Lab 09/23/24  0410   WBC 7.48   RBC 3.50*   HGB 10.2*   HCT 29.9*      MCV 85   MCH 29.1   MCHC 34.1     Mando Benitez MD    [facility] [place]  [phone]

## 2024-09-24 LAB
ALBUMIN SERPL BCP-MCNC: 3.1 G/DL (ref 3.5–5.2)
ALP SERPL-CCNC: 81 U/L (ref 55–135)
ALT SERPL W/O P-5'-P-CCNC: 8 U/L (ref 10–44)
ANION GAP SERPL CALC-SCNC: 14 MMOL/L (ref 8–16)
AST SERPL-CCNC: 10 U/L (ref 10–40)
BILIRUB SERPL-MCNC: 0.6 MG/DL (ref 0.1–1)
BUN SERPL-MCNC: 56 MG/DL (ref 6–20)
CALCIUM SERPL-MCNC: 10 MG/DL (ref 8.7–10.5)
CHLORIDE SERPL-SCNC: 99 MMOL/L (ref 95–110)
CO2 SERPL-SCNC: 20 MMOL/L (ref 23–29)
CREAT SERPL-MCNC: 8.5 MG/DL (ref 0.5–1.4)
EST. GFR  (NO RACE VARIABLE): 6 ML/MIN/1.73 M^2
GLUCOSE SERPL-MCNC: 90 MG/DL (ref 70–110)
POCT GLUCOSE: 130 MG/DL (ref 70–110)
POCT GLUCOSE: 158 MG/DL (ref 70–110)
POCT GLUCOSE: 93 MG/DL (ref 70–110)
POCT GLUCOSE: 98 MG/DL (ref 70–110)
POTASSIUM SERPL-SCNC: 4.6 MMOL/L (ref 3.5–5.1)
PROT SERPL-MCNC: 6.6 G/DL (ref 6–8.4)
SODIUM SERPL-SCNC: 133 MMOL/L (ref 136–145)

## 2024-09-24 PROCEDURE — 63600175 PHARM REV CODE 636 W HCPCS

## 2024-09-24 PROCEDURE — 25000003 PHARM REV CODE 250: Performed by: NURSE PRACTITIONER

## 2024-09-24 PROCEDURE — 36415 COLL VENOUS BLD VENIPUNCTURE: CPT | Performed by: INTERNAL MEDICINE

## 2024-09-24 PROCEDURE — 80053 COMPREHEN METABOLIC PANEL: CPT | Performed by: INTERNAL MEDICINE

## 2024-09-24 PROCEDURE — 63600175 PHARM REV CODE 636 W HCPCS: Performed by: NURSE PRACTITIONER

## 2024-09-24 PROCEDURE — 25000003 PHARM REV CODE 250

## 2024-09-24 PROCEDURE — 11000001 HC ACUTE MED/SURG PRIVATE ROOM

## 2024-09-24 PROCEDURE — 63600175 PHARM REV CODE 636 W HCPCS: Performed by: INTERNAL MEDICINE

## 2024-09-24 PROCEDURE — 25000003 PHARM REV CODE 250: Performed by: INTERNAL MEDICINE

## 2024-09-24 PROCEDURE — 94761 N-INVAS EAR/PLS OXIMETRY MLT: CPT

## 2024-09-24 PROCEDURE — 99900035 HC TECH TIME PER 15 MIN (STAT)

## 2024-09-24 PROCEDURE — 90935 HEMODIALYSIS ONE EVALUATION: CPT

## 2024-09-24 PROCEDURE — 63600175 PHARM REV CODE 636 W HCPCS: Mod: JG | Performed by: INTERNAL MEDICINE

## 2024-09-24 RX ORDER — HYDRALAZINE HYDROCHLORIDE 20 MG/ML
10 INJECTION INTRAMUSCULAR; INTRAVENOUS ONCE
Status: COMPLETED | OUTPATIENT
Start: 2024-09-24 | End: 2024-09-24

## 2024-09-24 RX ORDER — PROMETHAZINE HYDROCHLORIDE 25 MG/1
25 TABLET ORAL EVERY 4 HOURS PRN
Qty: 20 TABLET | Refills: 0 | Status: SHIPPED | OUTPATIENT
Start: 2024-09-24

## 2024-09-24 RX ORDER — HYDROMORPHONE HYDROCHLORIDE 1 MG/ML
1 INJECTION, SOLUTION INTRAMUSCULAR; INTRAVENOUS; SUBCUTANEOUS ONCE
Status: COMPLETED | OUTPATIENT
Start: 2024-09-24 | End: 2024-09-24

## 2024-09-24 RX ORDER — METOCLOPRAMIDE HYDROCHLORIDE 5 MG/ML
10 INJECTION INTRAMUSCULAR; INTRAVENOUS ONCE
Status: COMPLETED | OUTPATIENT
Start: 2024-09-24 | End: 2024-09-24

## 2024-09-24 RX ORDER — HYDROCODONE BITARTRATE AND ACETAMINOPHEN 5; 325 MG/1; MG/1
1 TABLET ORAL EVERY 4 HOURS PRN
Qty: 20 TABLET | Refills: 0 | Status: SHIPPED | OUTPATIENT
Start: 2024-09-24

## 2024-09-24 RX ORDER — LORAZEPAM 2 MG/ML
1 INJECTION INTRAMUSCULAR ONCE
Status: COMPLETED | OUTPATIENT
Start: 2024-09-24 | End: 2024-09-24

## 2024-09-24 RX ORDER — LORAZEPAM 1 MG/1
1 TABLET ORAL ONCE
Status: COMPLETED | OUTPATIENT
Start: 2024-09-24 | End: 2024-09-24

## 2024-09-24 RX ORDER — HYDRALAZINE HYDROCHLORIDE 25 MG/1
25 TABLET, FILM COATED ORAL EVERY 8 HOURS
Qty: 90 TABLET | Refills: 0 | Status: SHIPPED | OUTPATIENT
Start: 2024-09-24 | End: 2024-09-30 | Stop reason: HOSPADM

## 2024-09-24 RX ADMIN — LORAZEPAM 1 MG: 2 INJECTION INTRAMUSCULAR; INTRAVENOUS at 05:09

## 2024-09-24 RX ADMIN — EPOETIN ALFA-EPBX 2360 UNITS: 4000 INJECTION, SOLUTION INTRAVENOUS; SUBCUTANEOUS at 10:09

## 2024-09-24 RX ADMIN — LORAZEPAM 1 MG: 2 INJECTION INTRAMUSCULAR; INTRAVENOUS at 08:09

## 2024-09-24 RX ADMIN — PROMETHAZINE HYDROCHLORIDE 12.5 MG: 25 INJECTION INTRAMUSCULAR; INTRAVENOUS at 08:09

## 2024-09-24 RX ADMIN — HYDRALAZINE HYDROCHLORIDE 10 MG: 20 INJECTION INTRAMUSCULAR; INTRAVENOUS at 10:09

## 2024-09-24 RX ADMIN — APIXABAN 5 MG: 2.5 TABLET, FILM COATED ORAL at 09:09

## 2024-09-24 RX ADMIN — HYDROCODONE BITARTRATE AND ACETAMINOPHEN 1 TABLET: 5; 325 TABLET ORAL at 08:09

## 2024-09-24 RX ADMIN — HYDROMORPHONE HYDROCHLORIDE 1 MG: 1 INJECTION, SOLUTION INTRAMUSCULAR; INTRAVENOUS; SUBCUTANEOUS at 03:09

## 2024-09-24 RX ADMIN — HYDRALAZINE HYDROCHLORIDE 25 MG: 25 TABLET ORAL at 09:09

## 2024-09-24 RX ADMIN — FLUOXETINE HYDROCHLORIDE 40 MG: 20 CAPSULE ORAL at 03:09

## 2024-09-24 RX ADMIN — HYDROCODONE BITARTRATE AND ACETAMINOPHEN 1 TABLET: 5; 325 TABLET ORAL at 09:09

## 2024-09-24 RX ADMIN — METOCLOPRAMIDE 10 MG: 5 INJECTION, SOLUTION INTRAMUSCULAR; INTRAVENOUS at 10:09

## 2024-09-24 RX ADMIN — LEVETIRACETAM 500 MG: 250 TABLET, FILM COATED ORAL at 11:09

## 2024-09-24 RX ADMIN — SUCRALFATE 1 G: 1 TABLET ORAL at 09:09

## 2024-09-24 RX ADMIN — HYDRALAZINE HYDROCHLORIDE 25 MG: 25 TABLET ORAL at 03:09

## 2024-09-24 RX ADMIN — PREDNISOLONE ACETATE 1 DROP: 10 SUSPENSION/ DROPS OPHTHALMIC at 09:09

## 2024-09-24 RX ADMIN — PANTOPRAZOLE SODIUM 40 MG: 40 TABLET, DELAYED RELEASE ORAL at 11:09

## 2024-09-24 RX ADMIN — LORAZEPAM 1 MG: 1 TABLET ORAL at 11:09

## 2024-09-24 RX ADMIN — LORAZEPAM 1 MG: 2 INJECTION INTRAMUSCULAR; INTRAVENOUS at 11:09

## 2024-09-24 RX ADMIN — HYDRALAZINE HYDROCHLORIDE 25 MG: 25 TABLET ORAL at 05:09

## 2024-09-24 RX ADMIN — LEVETIRACETAM 500 MG: 250 TABLET, FILM COATED ORAL at 09:09

## 2024-09-24 RX ADMIN — LORAZEPAM 1 MG: 2 INJECTION INTRAMUSCULAR; INTRAVENOUS at 01:09

## 2024-09-24 RX ADMIN — PROMETHAZINE HYDROCHLORIDE 12.5 MG: 25 INJECTION INTRAMUSCULAR; INTRAVENOUS at 06:09

## 2024-09-24 NOTE — PLAN OF CARE
Problem: Hemodialysis  Goal: Safe, Effective Therapy Delivery  Outcome: Progressing  Goal: Effective Tissue Perfusion  Outcome: Progressing  Goal: Absence of Infection Signs and Symptoms  Outcome: Progressing     Problem: Adult Inpatient Plan of Care  Goal: Plan of Care Review  Outcome: Progressing  Goal: Patient-Specific Goal (Individualized)  Outcome: Progressing  Goal: Absence of Hospital-Acquired Illness or Injury  Outcome: Progressing  Goal: Optimal Comfort and Wellbeing  Outcome: Progressing  Goal: Readiness for Transition of Care  Outcome: Progressing     Problem: Diabetes Comorbidity  Goal: Blood Glucose Level Within Targeted Range  Outcome: Progressing     Problem: Fall Injury Risk  Goal: Absence of Fall and Fall-Related Injury  Outcome: Progressing   Plan of care reviewed with patient, verbalized understanding.  SR on telemetry. HS bg 307, covered with ss & scheduled insulin as per orders. Medicated for pain and anxiety  during night, moderate relief obtained. Remains free from falls, injury. Instructed to call for assistance as needed ,verbalized understanding. Bed in low & locked position. Call light in reach, bed alarm on .

## 2024-09-24 NOTE — PLAN OF CARE
POC discussed with pt, pt verbalized understanding. Oriented x4. PIV cdi. NSR on tele. Pt independent. Blood glucose monitored, insulin administered per orders. Pain to abdomen 10/10, prn's administered, pt reports no relief, tearful and rocking in bed, MD aware. Call light in reach, bed alarm set, safety maintained. No complaints or requests at this time, will continue to monitor.

## 2024-09-24 NOTE — PROGRESS NOTES
09/24/24 1100   Vital Signs   Temp 98.6 °F (37 °C)   Pulse 99   Resp 20   SpO2 98 %   BP (!) 157/97   Post-Hemodialysis Assessment   Rinseback Volume (mL) 250 mL   Blood Volume Processed (Liters) 55.7 L   Dialyzer Clearance Lightly streaked   Duration of Treatment 170 minutes   Total UF (mL) 2430 mL   Net Fluid Removal 1930   Patient Response to Treatment Pt continuously jerking around in bed. Pt given PRN pain medication and nausea medication. Pt requesting for PRN anxiety medication early. MD saw pt at bedside and pt cannot get next dose early.   Post-Treatment Weight 51.9 kg (114 lb 6.7 oz)   Treatment Weight Change -2.1   Arterial bleeding stop time (min) 7 min   Venous bleeding stop time (min) 7 min   Post-Hemodialysis Comments Pt would not quit moving in bed while needles being removed. Pt educated on importance of access care.     Report given to Alice LINTON

## 2024-09-24 NOTE — PLAN OF CARE
Discussed plans for DC today during rounds, pt states father will pick her up. SHAILA Jenkins will call father to update.

## 2024-09-24 NOTE — NURSING
Father, Garcia called notified that Ms Mcnair will be discharged today after Dialysis. Will call back with time after dialysis.

## 2024-09-24 NOTE — NURSING
Patient having nausea and vomiting, Dr Fish in room discussing Plan of care with patient. Discharge canceled due to nausea and vomiting.

## 2024-09-24 NOTE — DISCHARGE SUMMARY
Formerly [facility] Medicine  Discharge Summary      Patient Name: Tabby Howard  MRN: [id number]  [facility]: [id number]  Patient Class: IP- Inpatient  Admission Date: 9/19/2024  Hospital Length of Stay: 1 days  Discharge Date and Time:  09/24/2024 10:19 AM  Attending Physician: Alice Fish MD   Discharging Provider: Alice Fish MD, MD  Primary Care Provider: Melony Kim NP    Primary Care Team: Networked reference to record PCT     HPI:   Tabby Howard is a 34 year old female with a previous medical history of ESRD on dialysis Tue/Thur/Sat, chronic abdominal pain, HTN, Type II Diabetes, who presented to the ED  for left-sided flank pain. HPI is limited to chart review and ED note due to patient not being cooperative.  Per chart review, patient has multiple similar episodes in the past and has had multiple previous CT scans performed.  Also has had multiple hospitalizations for DKA in the past. Patient reports last dialysis was Monday 9/9/24 due to them scheduling her that day. She was unable to attend her normal Thursday session due to severe abdominal pain and vomiting. Of note she has had 5 ER visits over the past five days and most recent admission was on 9/10/24. She was admitted for DKA and insulin drip discontinued the next day. She received dialysis along with an EGD that was negative for any acute process. Initial ED evaluation showed elevated anion gap and betahydroxybutyrate but low normal PH. Dr. Bradford consulted and he placed orders for dialysis. Pain and nausea controlled with ativan and phenergan. Patient admitted by hospital medicine for further evaluation and management.     * No surgery found *      Hospital Course:   Patient is a 34 year old patient with diabetes and ESRD on HD, frequent hospital admissions for multiple complains, chronic abdominal pain. Patient was recently discharged few days ago. Patient came back with worsening pain on the left flank,  nausea and vomiting. Patient is euglycemic. However beta hydroxybutyrate was elevated. Patient was hydrated. Acidosis improved. The patient continued to c/o abdominal pain. Prn pain meds were given. The patient's symptoms improved. She was discharged home in stable condition.     Goals of Care Treatment Preferences:  Code Status: Full Code    Health care agent: Step-Mother Tanya Howard / Father Garcia Howard  Health care agent number: [phone] / [phone]                      Consults:   Consults (From admission, onward)          Status Ordering Provider     Case Management/  Once        Provider:  (Not yet assigned)    Completed RUBENS HAYES     Inpatient consult to Nephrology  Once        Provider:  Hugh Figueroa MD    Completed RUBENS HAYES            Neuro  Seizures  Home meds      Cardiac/Vascular  Hypertension  Home meds    Renal/  * High anion gap metabolic acidosis  Resolved          ESRD (end stage renal disease) on dialysis  F/U outupatient    Endocrine  Diabetes  Home meds      Final Active Diagnoses:    Diagnosis Date Noted POA    PRINCIPAL PROBLEM:  High anion gap metabolic acidosis [E87.29] 04/23/2021 Yes    Chronic abdominal pain [R10.9, G89.29] 09/19/2024 Yes    ESRD (end stage renal disease) on dialysis [N18.6, Z99.2] 09/10/2024 Not Applicable    Diabetes [E11.9] 08/25/2024 Yes    Seizures [R56.9] 07/16/2024 Yes    Hypertension [I10] 06/17/2023 Yes      Problems Resolved During this Admission:       Discharged Condition: stable    Disposition: Home or Self Care    Follow Up:   Follow-up Information       Melony Kim NP. Schedule an appointment as soon as possible for a visit in 1 week(s).    Specialty: Family Medicine  Why: appt requested, the clinic should call you to schedule. please call them if they do not.  Contact information:  [place] [id number] [phone]                           Patient Instructions:      Diet diabetic  "    Diet renal     Activity as tolerated       Significant Diagnostic Studies: N/A    Pending Diagnostic Studies:       None           Medications:  Reconciled Home Medications:      Medication List        START taking these medications      HYDROcodone-acetaminophen 5-325 mg per tablet  Commonly known as: NORCO  Take 1 tablet by mouth every 4 (four) hours as needed for Pain (flank pain).            CHANGE how you take these medications      hydrALAZINE 25 MG tablet  Commonly known as: APRESOLINE  Take 1 tablet (25 mg total) by mouth every 8 (eight) hours.  What changed:   medication strength  how much to take     * promethazine 25 MG tablet  Commonly known as: PHENERGAN  Take 1 tablet (25 mg total) by mouth every 6 (six) hours as needed.  What changed: Another medication with the same name was added. Make sure you understand how and when to take each.     * promethazine 25 MG tablet  Commonly known as: PHENERGAN  Take 1 tablet (25 mg total) by mouth every 4 (four) hours as needed for Nausea.  What changed: You were already taking a medication with the same name, and this prescription was added. Make sure you understand how and when to take each.           * This list has 2 medication(s) that are the same as other medications prescribed for you. Read the directions carefully, and ask your doctor or other care provider to review them with you.                CONTINUE taking these medications      ALPHAGAN P 0.15 % ophthalmic solution  Generic drug: brimonidine 0.15 % OPTH DROP  Place 1 drop into both eyes 3 (three) times daily.     apixaban 5 mg Tab  Commonly known as: ELIQUIS  Take 1 tablet (5 mg total) by mouth 2 (two) times daily. Patient w/ thrombocytopenia <50, so held during admission, follow-up with hematologist about restarting     atropine 1% 1 % Drop  Commonly known as: ISOPTO ATROPINE  Place 1 drop into the left eye 2 (two) times a day.     BD ULTRA-FINE MINI PEN NEEDLE 31 gauge x 3/16" Ndle  Generic drug: " pen needle, diabetic  Use as directed with insulin once daily     blood-glucose meter Misc  Commonly known as: TRUE METRIX GLUCOSE METER  Use to check blood glucose     blood-glucose meter,continuous Misc  USE WITH SENSORS     brinzolamide 1 % ophthalmic suspension  Commonly known as: AZOPT  Place1 drop in left eye 3 times a day for 30 days     busPIRone 10 MG tablet  Commonly known as: BUSPAR  Take 1 tablet (10 mg total) by mouth 3 (three) times daily as needed (anxiety).     cetirizine 10 MG tablet  Commonly known as: ZYRTEC  Take 1 tablet every day by oral route.     * DEXCOM G7 SENSOR Heather  Generic drug: blood-glucose sensor  CHANGE EVERY 10 DAYS     * DEXCOM G7 SENSOR Heather  Generic drug: blood-glucose sensor  Use as directed for CGM. Change sensor every 10 days     dorzolamide-timolol 2-0.5% 22.3-6.8 mg/mL ophthalmic solution  Commonly known as: COSOPT  place 1 drop in left eye twice a day  for 30 days     FLUoxetine 40 MG capsule  Take 1 capsule every day by oral route.     fluticasone propionate 50 mcg/actuation nasal spray  Commonly known as: FLONASE ALLERGY RELIEF  Spray 1 spray every day by intranasal route.     gabapentin 300 MG capsule  Commonly known as: NEURONTIN  Take 2 capsules twice a day by oral route for 90 days.     insulin aspart U-100 100 unit/mL (3 mL) Inpn pen  Commonly known as: NovoLOG  Inject 8 Units into the skin 3 (three) times daily with meals.     LANTUS SOLOSTAR U-100 INSULIN 100 unit/mL (3 mL) Inpn pen  Generic drug: insulin glargine U-100 (Lantus)  Inject 22 units every day by subcutaneous route in the evening for 30 days, for diabetes.     levETIRAcetam 500 MG Tab  Commonly known as: KEPPRA  Take 1 tablet (500 mg total) by mouth 2 (two) times daily.     ondansetron 4 MG Tbdl  Commonly known as: ZOFRAN-ODT  Place 1 tablet (4 mg) on or under the tongue every 8 hours for 10 days.     pantoprazole 40 MG tablet  Commonly known as: PROTONIX  Take 1 tablet (40 mg total) by mouth once  daily.     prednisoLONE acetate 1 % Drps  Commonly known as: PRED FORTE  Place 1 drop into the left eye 2 (two) times daily.     RENVELA 800 mg Tab  Generic drug: sevelamer carbonate  Take 1 tablet (800 mg total) by mouth 3 (three) times daily with meals.     sucralfate 1 gram tablet  Commonly known as: CARAFATE  Take 1 tablet (1 g total) by mouth 4 (four) times daily.     timolol maleate 0.5% 0.5 % Drop  Commonly known as: TIMOPTIC  Place 1 drop in left eye 2 times a day for 30 days     TRUE METRIX GLUCOSE TEST STRIP Strp  Generic drug: blood sugar diagnostic  Use 1 strip to check blood glucose three times daily     TRUEPLUS LANCETS 33 gauge Misc  Generic drug: lancets  Use 1 to check blood glucose three times daily     VENTOLIN HFA 90 mcg/actuation inhaler  Generic drug: albuterol  Inhale 2 puffs every 4 hours by inhalation route.           * This list has 2 medication(s) that are the same as other medications prescribed for you. Read the directions carefully, and ask your doctor or other care provider to review them with you.                STOP taking these medications      oxyCODONE-acetaminophen  mg per tablet  Commonly known as: PERCOCET              Indwelling Lines/Drains at time of discharge:   Lines/Drains/Airways       Drain  Duration                  Hemodialysis AV Fistula Left upper arm -- days                    Time spent on the discharge of patient: 34 minutes         Alice Fish MD, MD  Department of Hospital Medicine  [facility]/Surg

## 2024-09-24 NOTE — PLAN OF CARE
Pt clear for DC from CM standpoint. Discharging home.        09/24/24 1022   Final Note   Assessment Type Final Discharge Note   Anticipated Discharge Disposition Home   Hospital Resources/Appts/Education Provided Provided patient/caregiver with written discharge plan information

## 2024-09-24 NOTE — NURSING
Pt blood pressure is elevated. Her hydralazine has not been effective. BP is 183/111 HR 94. Notified MD, one time dose of metoprolol administered. BP improved, 162/98.

## 2024-09-24 NOTE — PROGRESS NOTES
[facility] Medicine  Progress Note    Patient Name: Tabby Howard  MRN: [id number]  Patient Class: IP- Inpatient   Admission Date: 9/19/2024  Length of Stay: 1 days  Attending Physician: Alice Fish MD  Primary Care Provider: Melony Kim NP        Subjective:     Principal Problem:High anion gap metabolic acidosis        HPI:  Tabby Howard is a 34 year old female with a previous medical history of ESRD on dialysis Tue/Thur/Sat, chronic abdominal pain, HTN, Type II Diabetes, who presented to the ED  for left-sided flank pain. HPI is limited to chart review and ED note due to patient not being cooperative.  Per chart review, patient has multiple similar episodes in the past and has had multiple previous CT scans performed.  Also has had multiple hospitalizations for DKA in the past. Patient reports last dialysis was Monday 9/9/24 due to them scheduling her that day. She was unable to attend her normal Thursday session due to severe abdominal pain and vomiting. Of note she has had 5 ER visits over the past five days and most recent admission was on 9/10/24. She was admitted for DKA and insulin drip discontinued the next day. She received dialysis along with an EGD that was negative for any acute process. Initial ED evaluation showed elevated anion gap and betahydroxybutyrate but low normal PH. Dr. Bradford consulted and he placed orders for dialysis. Pain and nausea controlled with ativan and phenergan. Patient admitted by hospital medicine for further evaluation and management.     Overview/Hospital Course:  Patient is a 34 year old patient with diabetes and ESRD on HD, frequent hospital admissions for multiple complains, chronic abdominal pain. Patient was recently discharged few days ago. Patient came back with worsening pain on the left flank, nausea and vomiting. Patient is euglycemic. However beta hydroxybutyrate was elevated. Patient was hydrated. Acidosis improved.  The patient continued to c/o abdominal pain. Prn pain meds were given.     Interval History: Still complaining of left flank pain. Acidosis has improved.    Review of Systems  Objective:     Vital Signs (Most Recent):  Temp: 98.6 °F (37 °C) (09/24/24 1100)  Pulse: 99 (09/24/24 1100)  Resp: 20 (09/24/24 1100)  BP: (!) 157/97 (09/24/24 1100)  SpO2: 98 % (09/24/24 1100) Vital Signs (24h Range):  Temp:  [97.9 °F (36.6 °C)-99 °F (37.2 °C)] 98.6 °F (37 °C)  Pulse:  [] 99  Resp:  [18-20] 20  SpO2:  [97 %-100 %] 98 %  BP: ()/() 157/97     Weight: 54.5 kg (120 lb 2.4 oz)  Body mass index is 21.98 kg/m².    Intake/Output Summary (Last 24 hours) at 9/24/2024 1242  Last data filed at 9/24/2024 1100  Gross per 24 hour   Intake 420 ml   Output 2430 ml   Net -2010 ml         Physical Exam  Vitals reviewed.   Constitutional:       Appearance: She is ill-appearing.      Comments: Chronically ill appearing   HENT:      Head: Normocephalic and atraumatic.      Nose: Nose normal.      Mouth/Throat:      Mouth: Mucous membranes are dry.      Pharynx: Oropharynx is clear.   Eyes:      Conjunctiva/sclera:      Left eye: Hemorrhage present.   Cardiovascular:      Rate and Rhythm: Regular rhythm.      Pulses:           Radial pulses are 2+ on the right side and 2+ on the left side.        Dorsalis pedis pulses are 2+ on the right side and 2+ on the left side.      Arteriovenous access: Left arteriovenous access is present.  Pulmonary:      Effort: Pulmonary effort is normal.      Breath sounds: Normal breath sounds.   Abdominal:      General: Bowel sounds are normal.      Palpations: Abdomen is soft.      Tenderness: There is generalized abdominal tenderness.   Musculoskeletal:         General: Normal range of motion.      Cervical back: Normal range of motion and neck supple.      Right lower leg: No edema.      Left lower leg: No edema.   Skin:     General: Skin is warm and dry.      Capillary Refill: Capillary refill  takes less than 2 seconds.   Neurological:      General: No focal deficit present.      Mental Status: She is alert and oriented to person, place, and time. Mental status is at baseline.   Psychiatric:         Attention and Perception: Attention normal.         Behavior: Behavior is withdrawn       Significant Labs: All pertinent labs within the past 24 hours have been reviewed.  CBC:   Recent Labs   Lab 09/23/24  0410   WBC 7.48   HGB 10.2*   HCT 29.9*        CMP:   Recent Labs   Lab 09/22/24  2109 09/23/24  0410 09/24/24  0725   NA  --  130* 133*   K  --  4.6 4.6   CL  --  96 99   CO2  --  21* 20*   * 174* 90   BUN  --  38* 56*   CREATININE  --  7.0* 8.5*   CALCIUM  --  9.4 10.0   PROT  --  6.4 6.6   ALBUMIN  --  3.0* 3.1*   BILITOT  --  0.5 0.6   ALKPHOS  --  81 81   AST  --  12 10   ALT  --  9* 8*   ANIONGAP  --  13 14       Significant Imaging: I have reviewed all pertinent imaging results/findings within the past 24 hours.  Physical Exam    Assessment/Plan:      * High anion gap metabolic acidosis  Resolved          Chronic abdominal pain  Prn pain meds and nausea meds      ESRD (end stage renal disease) on dialysis  Dialysis as per nephrology      Diabetes  SSI    Seizures  Home meds      Hypertension  Home meds      VTE Risk Mitigation (From admission, onward)           Ordered     apixaban tablet 5 mg  2 times daily         09/19/24 1751     IP VTE HIGH RISK PATIENT  Once         09/19/24 1624     Place sequential compression device  Until discontinued         09/19/24 1624                    Discharge Planning   TATIANA: 9/24/2024     Code Status: Full Code   Is the patient medically ready for discharge?:     Reason for patient still in hospital (select all that apply): Patient trending condition  Discharge Plan A: Home with family                  Alice Fish MD, MD  Department of Hospital Medicine   [facility]/Surg

## 2024-09-24 NOTE — SUBJECTIVE & OBJECTIVE
Interval History: Still complaining of left flank pain. Acidosis has improved.    Review of Systems  Objective:     Vital Signs (Most Recent):  Temp: 98.6 °F (37 °C) (09/24/24 1100)  Pulse: 99 (09/24/24 1100)  Resp: 20 (09/24/24 1100)  BP: (!) 157/97 (09/24/24 1100)  SpO2: 98 % (09/24/24 1100) Vital Signs (24h Range):  Temp:  [97.9 °F (36.6 °C)-99 °F (37.2 °C)] 98.6 °F (37 °C)  Pulse:  [] 99  Resp:  [18-20] 20  SpO2:  [97 %-100 %] 98 %  BP: ()/() 157/97     Weight: 54.5 kg (120 lb 2.4 oz)  Body mass index is 21.98 kg/m².    Intake/Output Summary (Last 24 hours) at 9/24/2024 1242  Last data filed at 9/24/2024 1100  Gross per 24 hour   Intake 420 ml   Output 2430 ml   Net -2010 ml         Physical Exam  Vitals reviewed.   Constitutional:       Appearance: She is ill-appearing.      Comments: Chronically ill appearing   HENT:      Head: Normocephalic and atraumatic.      Nose: Nose normal.      Mouth/Throat:      Mouth: Mucous membranes are dry.      Pharynx: Oropharynx is clear.   Eyes:      Conjunctiva/sclera:      Left eye: Hemorrhage present.   Cardiovascular:      Rate and Rhythm: Regular rhythm.      Pulses:           Radial pulses are 2+ on the right side and 2+ on the left side.        Dorsalis pedis pulses are 2+ on the right side and 2+ on the left side.      Arteriovenous access: Left arteriovenous access is present.  Pulmonary:      Effort: Pulmonary effort is normal.      Breath sounds: Normal breath sounds.   Abdominal:      General: Bowel sounds are normal.      Palpations: Abdomen is soft.      Tenderness: There is generalized abdominal tenderness.   Musculoskeletal:         General: Normal range of motion.      Cervical back: Normal range of motion and neck supple.      Right lower leg: No edema.      Left lower leg: No edema.   Skin:     General: Skin is warm and dry.      Capillary Refill: Capillary refill takes less than 2 seconds.   Neurological:      General: No focal deficit  present.      Mental Status: She is alert and oriented to person, place, and time. Mental status is at baseline.   Psychiatric:         Attention and Perception: Attention normal.         Behavior: Behavior is withdrawn       Significant Labs: All pertinent labs within the past 24 hours have been reviewed.  CBC:   Recent Labs   Lab 09/23/24  0410   WBC 7.48   HGB 10.2*   HCT 29.9*        CMP:   Recent Labs   Lab 09/22/24  2109 09/23/24  0410 09/24/24  0725   NA  --  130* 133*   K  --  4.6 4.6   CL  --  96 99   CO2  --  21* 20*   * 174* 90   BUN  --  38* 56*   CREATININE  --  7.0* 8.5*   CALCIUM  --  9.4 10.0   PROT  --  6.4 6.6   ALBUMIN  --  3.0* 3.1*   BILITOT  --  0.5 0.6   ALKPHOS  --  81 81   AST  --  12 10   ALT  --  9* 8*   ANIONGAP  --  13 14       Significant Imaging: I have reviewed all pertinent imaging results/findings within the past 24 hours.  Physical Exam

## 2024-09-24 NOTE — PLAN OF CARE
Problem: Violence Risk or Actual  Goal: Anger and Impulse Control  Outcome: Progressing     Problem: Hemodialysis  Goal: Safe, Effective Therapy Delivery  Outcome: Progressing  Goal: Effective Tissue Perfusion  Outcome: Progressing  Goal: Absence of Infection Signs and Symptoms  Outcome: Progressing     Problem: Adult Inpatient Plan of Care  Goal: Plan of Care Review  Outcome: Progressing  Goal: Patient-Specific Goal (Individualized)  Outcome: Progressing  Goal: Absence of Hospital-Acquired Illness or Injury  Outcome: Progressing  Goal: Optimal Comfort and Wellbeing  Outcome: Progressing  Goal: Readiness for Transition of Care  Outcome: Progressing     Problem: Fall Injury Risk  Goal: Absence of Fall and Fall-Related Injury  Outcome: Progressing

## 2024-09-24 NOTE — PLAN OF CARE
DC cancelled due to vomiting. CM following for DC planning.      09/24/24 4272   Discharge Reassessment   Assessment Type Discharge Planning Reassessment   Did the patient's condition or plan change since previous assessment? Yes   Discharge Plan discussed with: Patient;Parent(s)   Why the patient remains in the hospital Requires continued medical care

## 2024-09-24 NOTE — PROGRESS NOTES
INPATIENT NEPHROLOGY progress  [facility] NEPHROLOGY    Tabby Howard  09/24/2024    Reason for consultation:  esrd    Chief Complaint:   Chief Complaint   Patient presents with    Flank Pain     Left side flank pain, N&V starting this morning     History of Present Illness:    Per H and P  Tabby Howard is a 34 year old female with a previous medical history of ESRD on dialysis Tue/Thur/Sat, chronic abdominal pain, HTN, Type II Diabetes, who presented to the ED  for left-sided flank pain. HPI is limited to chart review and ED note due to patient not being cooperative.  Per chart review, patient has multiple similar episodes in the past and has had multiple previous CT scans performed.  Also has had multiple hospitalizations for DKA in the past. Patient reports last dialysis was Monday 9/9/24 due to them scheduling her that day. She was unable to attend her normal Thursday session due to severe abdominal pain and vomiting. Of note she has had 5 ER visits over the past five days and most recent admission was on 9/10/24. She was admitted for DKA and insulin drip discontinued the next day. She received dialysis along with an EGD that showed mild gastritis. Initial ED evaluation showed elevated anion gap and betahydroxybutyrate but low normal PH.      9/21  pressures low, but stable.  Lab results reviewed.  Seen today on dialysis.  Has hypotension, but alert and oriented.    9/22  pressures actually high now--will resume home hydralazine at low dose.  Lab results reviewed.  Still w/same pain, no other complaints.  0/23 VSS. Plan HD tomorrow or PRN.  9/24 VSS. HD  today. OK to dc home if stable after HD.    Plan of Care:    ESRD on HD TTS  Hyponatremia  Acidosis  --continue dialysis per routine  --renal dose medication per routine  --pt with 17 hospital admissions in last 12 months    Anemia of CKD  --erythropoiesis stimulating agent with renal replacement therapy    Secondary HPT  --renal diet as tolerated  --continue  binders with meals if tolerated    Hypertension  --uf with hd as tolerated  --low salt diet as tolerated  --continue home medication    DM  Gastroparesis   --management per primary team    Thank you for allowing us to participate in this patient's care. We will continue to follow.    Vital Signs:  Temp Readings from Last 3 Encounters:   24 98.5 °F (36.9 °C)   24 98.4 °F (36.9 °C) (Oral)   24 98 °F (36.7 °C)       Pulse Readings from Last 3 Encounters:   24 88   24 94   24 92       BP Readings from Last 3 Encounters:   24 (!) 168/101   24 122/75   24 (!) 180/99       Weight:  Wt Readings from Last 3 Encounters:   24 54.5 kg (120 lb 2.4 oz)   24 52.6 kg (116 lb)   24 52.6 kg (116 lb)       Past Medical & Surgical History:  Past Medical History:   Diagnosis Date    ESRD on hemodialysis 2022    Gastritis 2022    EGD was 22    Gastroparesis 2022    has not had Emptying study    Heart failure with preserved ejection fraction 2022    EF 55% on 3/22    History of supraventricular tachycardia     Hyperkalemia 2022    Hypertensive emergency 2022    Sickle cell trait 2022    Type 2 diabetes mellitus        Past Surgical History:   Procedure Laterality Date     SECTION      x 3    COLONOSCOPY      COLONOSCOPY N/A 2022    Procedure: COLONOSCOPY;  Surgeon: Jagdeep Cedeno MD;  Location: [facility];  Service: Endoscopy;  Laterality: N/A;    DESTRUCTION, CILIARY BODY, USING LASER Left 2024    Procedure: Cyclophotocoagulation G-probe, retrobulbar chlorpromazine left eye;  Surgeon: Fidel Wise MD;  Location: [facility] OR [address] [place] [address];  Service: Ophthalmology;  Laterality: Left;    ENDOSCOPIC ULTRASOUND OF UPPER GASTROINTESTINAL TRACT N/A 2023    Procedure: ULTRASOUND, UPPER GI TRACT, ENDOSCOPIC;  Surgeon: Micky Paredes III, MD;  Location: [facility];  Service: Endoscopy;  Laterality: N/A;     ESOPHAGOGASTRODUODENOSCOPY N/A 10/18/2019    Procedure: ESOPHAGOGASTRODUODENOSCOPY (EGD);  Surgeon: Gianluca Mendez MD;  Location: [facility] ENDO;  Service: Endoscopy;  Laterality: N/A;    ESOPHAGOGASTRODUODENOSCOPY N/A 08/24/2022    Procedure: EGD (ESOPHAGOGASTRODUODENOSCOPY);  Surgeon: Micky Paredes III, MD;  Location: [facility] ENDO;  Service: Endoscopy;  Laterality: N/A;    ESOPHAGOGASTRODUODENOSCOPY N/A 12/05/2022    Procedure: EGD (ESOPHAGOGASTRODUODENOSCOPY);  Surgeon: Marcelo Zhong MD;  Location: [facility] ENDO;  Service: Endoscopy;  Laterality: N/A;    ESOPHAGOGASTRODUODENOSCOPY N/A 9/13/2024    Procedure: EGD (ESOPHAGOGASTRODUODENOSCOPY);  Surgeon: Marcelo Zhong MD;  Location: [facility] ENDO;  Service: Endoscopy;  Laterality: N/A;    EYE SURGERY      INSERTION, CATHETER, TUNNELED N/A 06/17/2023    Procedure: Insertion,catheter,tunneled;  Surgeon: Carlos Thurman Jr., MD;  Location: [facility] OR;  Service: General;  Laterality: N/A;    LAPAROSCOPIC CHOLECYSTECTOMY N/A 07/30/2022    Procedure: CHOLECYSTECTOMY, LAPAROSCOPIC;  Surgeon: Grey Perez MD;  Location: [facility] OR;  Service: General;  Laterality: N/A;    PLACEMENT OF DUAL-LUMEN VASCULAR CATHETER Left 07/12/2022    Procedure: INSERTION-CATHETER-OJSEPH;  Surgeon: Dionte Gan MD;  Location: [facility] OR;  Service: General;  Laterality: Left;    PLACEMENT OF DUAL-LUMEN VASCULAR CATHETER Right 07/26/2022    Procedure: INSERTION-CATHETER-Hemosplit;  Surgeon: Dionte Gan MD;  Location: [facility] OR;  Service: General;  Laterality: Right;       Past Social History:  Social History     Socioeconomic History    Marital status:    Tobacco Use    Smoking status: Never    Smokeless tobacco: Never   Substance and Sexual Activity    Alcohol use: Not Currently    Drug use: No    Sexual activity: Not Currently     Partners: Male     Birth control/protection: I.U.D.     Social Determinants of Health     Financial Resource Strain: Low Risk  (8/29/2024)    Overall  Financial Resource Strain (CARDIA)     Difficulty of Paying Living Expenses: Not hard at all   Recent Concern: Financial Resource Strain - Medium Risk (8/25/2024)    Overall Financial Resource Strain (CARDIA)     Difficulty of Paying Living Expenses: Somewhat hard   Food Insecurity: No Food Insecurity (8/29/2024)    Hunger Vital Sign     Worried About Running Out of Food in the Last Year: Never true     Ran Out of Food in the Last Year: Never true   Recent Concern: Food Insecurity - Food Insecurity Present (8/25/2024)    Hunger Vital Sign     Worried About Running Out of Food in the Last Year: Often true     Ran Out of Food in the Last Year: Often true   Transportation Needs: No Transportation Needs (8/29/2024)    TRANSPORTATION NEEDS     Transportation : No   Physical Activity: Sufficiently Active (8/29/2024)    Exercise Vital Sign     Days of Exercise per Week: 5 days     Minutes of Exercise per Session: 30 min   Recent Concern: Physical Activity - Inactive (8/25/2024)    Exercise Vital Sign     Days of Exercise per Week: 0 days     Minutes of Exercise per Session: 0 min   Stress: No Stress Concern Present (8/29/2024)    [employer] - Occupational Stress Questionnaire     Feeling of Stress : Not at all   Recent Concern: Stress - Stress Concern Present (8/25/2024)    [employer] - Occupational Stress Questionnaire     Feeling of Stress : Rather much   Housing Stability: Low Risk  (8/29/2024)    Housing Stability Vital Sign     Unable to Pay for Housing in the Last Year: No     Homeless in the Last Year: No   Recent Concern: Housing Stability - High Risk (8/25/2024)    Housing Stability Vital Sign     Unable to Pay for Housing in the Last Year: Yes     Homeless in the Last Year: No       Medications:  No current facility-administered medications on file prior to encounter.     Current Outpatient Medications on File Prior to Encounter   Medication Sig Dispense  Refill    albuterol (VENTOLIN HFA) 90 mcg/actuation inhaler Inhale 2 puffs every 4 hours by inhalation route. 8 g 2    apixaban (ELIQUIS) 5 mg Tab Take 1 tablet (5 mg total) by mouth 2 (two) times daily. Patient w/ thrombocytopenia <50, so held during admission, follow-up with hematologist about restarting 180 tablet 1    brinzolamide (AZOPT) 1 % ophthalmic suspension Place1 drop in left eye 3 times a day for 30 days 15 mL 6    cetirizine (ZYRTEC) 10 MG tablet Take 1 tablet every day by oral route. 30 tablet 0    FLUoxetine 40 MG capsule Take 1 capsule every day by oral route. 30 capsule 2    fluticasone propionate (FLONASE ALLERGY RELIEF) 50 mcg/actuation nasal spray [place] 1 spray every day by intranasal route. 16 g 2    gabapentin (NEURONTIN) 300 MG capsule Take 2 capsules twice a day by oral route for 90 days. 360 capsule 1    hydrALAZINE (APRESOLINE) 50 MG tablet Take 1 tablet (50 mg total) by mouth every 8 (eight) hours. 90 tablet 0    insulin aspart U-100 (NOVOLOG) 100 unit/mL (3 mL) InPn pen Inject 8 Units into the skin 3 (three) times daily with meals. 15 mL 0    insulin glargine U-100, Lantus, (LANTUS SOLOSTAR U-100 INSULIN) 100 unit/mL (3 mL) InPn pen Inject 22 units every day by subcutaneous route in the evening for 30 days, for diabetes. 15 mL 2    levETIRAcetam (KEPPRA) 500 MG Tab Take 1 tablet (500 mg total) by mouth 2 (two) times daily. 60 tablet 11    ondansetron (ZOFRAN-ODT) 4 MG TbDL Place 1 tablet (4 mg) on or under the tongue every 8 hours for 10 days. 24 tablet 2    pantoprazole (PROTONIX) 40 MG tablet Take 1 tablet (40 mg total) by mouth once daily. 30 tablet 0    prednisoLONE acetate (PRED FORTE) 1 % DrpS Place 1 drop into the left eye 2 (two) times daily. 5 mL 3    promethazine (PHENERGAN) 25 MG tablet Take 1 tablet (25 mg total) by mouth every 6 (six) hours as needed. 15 tablet 0    sevelamer carbonate (RENVELA) 800 mg Tab Take 1 tablet (800 mg total) by mouth 3 (three) times daily with  "meals. 180 tablet 11    sucralfate (CARAFATE) 1 gram tablet Take 1 tablet (1 g total) by mouth 4 (four) times daily. 100 tablet 1    atropine 1% (ISOPTO ATROPINE) 1 % Drop Place 1 drop into the left eye 2 (two) times a day. 15 mL 3    blood sugar diagnostic (TRUE METRIX GLUCOSE TEST STRIP) Strp Use 1 strip to check blood glucose three times daily 100 each 11    blood-glucose meter (TRUE METRIX GLUCOSE METER) Misc Use to check blood glucose 1 each 0    blood-glucose meter,continuous Misc USE WITH SENSORS 1 each 0    blood-glucose sensor (DEXCOM G7 SENSOR) Heather Use as directed for CGM. Change sensor every 10 days 3 each 0    blood-glucose sensor Heather CHANGE EVERY 10 DAYS 3 each 0    brimonidine 0.15 % OPTH DROP (ALPHAGAN) 0.15 % ophthalmic solution Place 1 drop into both eyes 3 (three) times daily. 15 mL 3    busPIRone (BUSPAR) 10 MG tablet Take 1 tablet (10 mg total) by mouth 3 (three) times daily as needed (anxiety). 60 tablet 5    dorzolamide-timolol 2-0.5% (COSOPT) 22.3-6.8 mg/mL ophthalmic solution place 1 drop in left eye twice a day  for 30 days 10 mL 0    lancets (TRUEPLUS LANCETS) 33 gauge Misc Use 1 to check blood glucose three times daily 100 each 11    oxyCODONE-acetaminophen (PERCOCET)  mg per tablet Take 1 tablet by mouth every 4 (four) hours as needed for Pain. 42 tablet 0    pen needle, diabetic 31 gauge x 3/16" Ndle Use as directed with insulin once daily 100 each 0    timolol maleate 0.5% (TIMOPTIC) 0.5 % Drop Place 1 drop in left eye 2 times a day for 30 days 5 mL 6    [DISCONTINUED] atenoloL (TENORMIN) 50 MG tablet Take 1 tablet (50 mg total) by mouth every other day. 45 tablet 3    [DISCONTINUED] insulin detemir U-100, Levemir, (LEVEMIR FLEXTOUCH U100 INSULIN) 100 unit/mL (3 mL) InPn pen Inject 22 units every day by subcutaneous route in the evening for 90 days. 18 mL 1    [DISCONTINUED] omeprazole (PRILOSEC) 20 MG capsule Take 2 capsules (40 mg total) by mouth once daily. for 10 days 20 " "capsule 0     Scheduled Meds:   apixaban  5 mg Oral BID    epoetin teresa-epbx  50 Units/kg Intravenous Every Tues, Thurs, Sat    FLUoxetine  40 mg Oral Daily    hydrALAZINE  25 mg Oral Q8H    insulin glargine U-100 (Lantus)  20 Units Subcutaneous QHS    levETIRAcetam  500 mg Oral BID    pantoprazole  40 mg Oral Daily    prednisoLONE acetate  1 drop Left Eye BID    sevelamer carbonate  800 mg Oral TID WM    sucralfate  1 g Oral QID     Continuous Infusions:  PRN Meds:.  Current Facility-Administered Medications:     acetaminophen, 650 mg, Oral, Q4H PRN    albuterol-ipratropium, 3 mL, Nebulization, Q6H PRN    aluminum-magnesium hydroxide-simethicone, 30 mL, Oral, QID PRN    dextrose 10%, 12.5 g, Intravenous, PRN    dextrose 10%, 25 g, Intravenous, PRN    HYDROcodone-acetaminophen, 1 tablet, Oral, Q4H PRN    insulin aspart U-100, 0-10 Units, Subcutaneous, QID (AC + HS) PRN    lorazepam, 1 mg, Intravenous, Q6H PRN    naloxone, 0.02 mg, Intravenous, PRN    promethazine (PHENERGAN) 12.5 mg in 0.9% NaCl 50 mL IVPB, 12.5 mg, Intravenous, Q6H PRN    simethicone, 1 tablet, Oral, QID PRN    sodium chloride 0.9%, 10 mL, Intravenous, Q12H PRN    Allergies:  Droperidol, Haldol [haloperidol lactate], Penicillins, and Droperidol (bulk)    Past Family History:  Reviewed; refer to Hospitalist Admission Note    Review of Systems:  Review of Systems - All 14 systems reviewed and negative, except as noted in HPI    Physical Exam:    BP (!) 168/101   Pulse 88   Temp 98.5 °F (36.9 °C)   Resp 20   Ht 5' 2" (1.575 m)   Wt 54.5 kg (120 lb 2.4 oz)   LMP  (LMP Unknown) Comment: pt states last mentral cycle was 3yrs ago  SpO2 99%   Breastfeeding No   BMI 21.98 kg/m²     General Appearance:    Alert, cooperative, no distress, appears stated age   Head:    Normocephalic, without obvious abnormality, atraumatic   Eyes:    PER, conjunctiva/corneas clear, EOM's intact in both eyes        Throat:   Lips, mucosa, and tongue normal; teeth and " "gums normal   Back:     Symmetric, no curvature, ROM normal, no CVA tenderness   Lungs:     Clear to auscultation bilaterally, respirations unlabored   Chest wall:    No tenderness or deformity   Heart:    Regular rate and rhythm, S1 and S2 normal, no murmur, rub   or gallop   Abdomen:     Soft, non-tender, bowel sounds active all four quadrants,     no masses, no organomegaly   Extremities:   Extremities normal, atraumatic, no cyanosis or edema   Pulses:   2+ and symmetric all extremities   MSK:   No joint or muscle swelling, tenderness or deformity   Skin:   Skin color, texture, turgor normal, no rashes or lesions   Neurologic:   CNII-XII intact, normal strength and sensation       Throughout.  No flap     Results:  Lab Results   Component Value Date     (L) 09/24/2024    K 4.6 09/24/2024    CL 99 09/24/2024    CO2 20 (L) 09/24/2024    BUN 56 (H) 09/24/2024    CREATININE 8.5 (H) 09/24/2024    CALCIUM 10.0 09/24/2024    ANIONGAP 14 09/24/2024    ESTGFRAFRICA 19 (L) 09/03/2022    EGFRNONAA 18 (A) 07/31/2022       Lab Results   Component Value Date    CALCIUM 10.0 09/24/2024    PHOS 2.5 (L) 09/22/2024       No results for input(s): "WBC", "RBC", "HGB", "HCT", "PLT", "MCV", "MCH", "MCHC" in the last 24 hours.    Mando Benitez MD    [facility]  [phone]     "

## 2024-09-25 LAB
ALBUMIN SERPL BCP-MCNC: 3.3 G/DL (ref 3.5–5.2)
ALP SERPL-CCNC: 85 U/L (ref 55–135)
ALT SERPL W/O P-5'-P-CCNC: 6 U/L (ref 10–44)
ANION GAP SERPL CALC-SCNC: 17 MMOL/L (ref 8–16)
AST SERPL-CCNC: 14 U/L (ref 10–40)
BILIRUB SERPL-MCNC: 1.1 MG/DL (ref 0.1–1)
BUN SERPL-MCNC: 33 MG/DL (ref 6–20)
CALCIUM SERPL-MCNC: 10.3 MG/DL (ref 8.7–10.5)
CHLORIDE SERPL-SCNC: 104 MMOL/L (ref 95–110)
CO2 SERPL-SCNC: 19 MMOL/L (ref 23–29)
CREAT SERPL-MCNC: 6.7 MG/DL (ref 0.5–1.4)
EST. GFR  (NO RACE VARIABLE): 8 ML/MIN/1.73 M^2
GLUCOSE SERPL-MCNC: 158 MG/DL (ref 70–110)
POCT GLUCOSE: 163 MG/DL (ref 70–110)
POCT GLUCOSE: 181 MG/DL (ref 70–110)
POCT GLUCOSE: 187 MG/DL (ref 70–110)
POCT GLUCOSE: 189 MG/DL (ref 70–110)
POTASSIUM SERPL-SCNC: 4.3 MMOL/L (ref 3.5–5.1)
PROT SERPL-MCNC: 7.3 G/DL (ref 6–8.4)
SODIUM SERPL-SCNC: 140 MMOL/L (ref 136–145)

## 2024-09-25 PROCEDURE — 25000003 PHARM REV CODE 250

## 2024-09-25 PROCEDURE — 99900035 HC TECH TIME PER 15 MIN (STAT)

## 2024-09-25 PROCEDURE — 63600175 PHARM REV CODE 636 W HCPCS: Performed by: INTERNAL MEDICINE

## 2024-09-25 PROCEDURE — 63600175 PHARM REV CODE 636 W HCPCS

## 2024-09-25 PROCEDURE — 94761 N-INVAS EAR/PLS OXIMETRY MLT: CPT

## 2024-09-25 PROCEDURE — 11000001 HC ACUTE MED/SURG PRIVATE ROOM

## 2024-09-25 PROCEDURE — 80053 COMPREHEN METABOLIC PANEL: CPT | Performed by: INTERNAL MEDICINE

## 2024-09-25 PROCEDURE — 25000003 PHARM REV CODE 250: Performed by: NURSE PRACTITIONER

## 2024-09-25 PROCEDURE — 36415 COLL VENOUS BLD VENIPUNCTURE: CPT | Performed by: INTERNAL MEDICINE

## 2024-09-25 RX ORDER — HYDROMORPHONE HYDROCHLORIDE 1 MG/ML
1 INJECTION, SOLUTION INTRAMUSCULAR; INTRAVENOUS; SUBCUTANEOUS EVERY 6 HOURS PRN
Status: DISCONTINUED | OUTPATIENT
Start: 2024-09-25 | End: 2024-09-27

## 2024-09-25 RX ADMIN — FLUOXETINE HYDROCHLORIDE 40 MG: 20 CAPSULE ORAL at 09:09

## 2024-09-25 RX ADMIN — INSULIN GLARGINE 20 UNITS: 100 INJECTION, SOLUTION SUBCUTANEOUS at 08:09

## 2024-09-25 RX ADMIN — LORAZEPAM 1 MG: 2 INJECTION INTRAMUSCULAR; INTRAVENOUS at 04:09

## 2024-09-25 RX ADMIN — PREDNISOLONE ACETATE 1 DROP: 10 SUSPENSION/ DROPS OPHTHALMIC at 08:09

## 2024-09-25 RX ADMIN — SEVELAMER CARBONATE 800 MG: 800 TABLET, FILM COATED ORAL at 05:09

## 2024-09-25 RX ADMIN — HYDROMORPHONE HYDROCHLORIDE 1 MG: 1 INJECTION, SOLUTION INTRAMUSCULAR; INTRAVENOUS; SUBCUTANEOUS at 02:09

## 2024-09-25 RX ADMIN — SEVELAMER CARBONATE 800 MG: 800 TABLET, FILM COATED ORAL at 07:09

## 2024-09-25 RX ADMIN — APIXABAN 5 MG: 2.5 TABLET, FILM COATED ORAL at 08:09

## 2024-09-25 RX ADMIN — HYDRALAZINE HYDROCHLORIDE 25 MG: 25 TABLET ORAL at 06:09

## 2024-09-25 RX ADMIN — SUCRALFATE 1 G: 1 TABLET ORAL at 08:09

## 2024-09-25 RX ADMIN — INSULIN ASPART 2 UNITS: 100 INJECTION, SOLUTION INTRAVENOUS; SUBCUTANEOUS at 12:09

## 2024-09-25 RX ADMIN — LEVETIRACETAM 500 MG: 250 TABLET, FILM COATED ORAL at 09:09

## 2024-09-25 RX ADMIN — HYDROMORPHONE HYDROCHLORIDE 1 MG: 1 INJECTION, SOLUTION INTRAMUSCULAR; INTRAVENOUS; SUBCUTANEOUS at 08:09

## 2024-09-25 RX ADMIN — SUCRALFATE 1 G: 1 TABLET ORAL at 09:09

## 2024-09-25 RX ADMIN — SUCRALFATE 1 G: 1 TABLET ORAL at 05:09

## 2024-09-25 RX ADMIN — PANTOPRAZOLE SODIUM 40 MG: 40 TABLET, DELAYED RELEASE ORAL at 09:09

## 2024-09-25 RX ADMIN — LEVETIRACETAM 500 MG: 250 TABLET, FILM COATED ORAL at 08:09

## 2024-09-25 RX ADMIN — SUCRALFATE 1 G: 1 TABLET ORAL at 12:09

## 2024-09-25 RX ADMIN — LORAZEPAM 1 MG: 2 INJECTION INTRAMUSCULAR; INTRAVENOUS at 11:09

## 2024-09-25 RX ADMIN — HYDROMORPHONE HYDROCHLORIDE 1 MG: 1 INJECTION, SOLUTION INTRAMUSCULAR; INTRAVENOUS; SUBCUTANEOUS at 09:09

## 2024-09-25 RX ADMIN — INSULIN ASPART 2 UNITS: 100 INJECTION, SOLUTION INTRAVENOUS; SUBCUTANEOUS at 05:09

## 2024-09-25 RX ADMIN — SEVELAMER CARBONATE 800 MG: 800 TABLET, FILM COATED ORAL at 12:09

## 2024-09-25 RX ADMIN — APIXABAN 5 MG: 2.5 TABLET, FILM COATED ORAL at 09:09

## 2024-09-25 RX ADMIN — HYDRALAZINE HYDROCHLORIDE 25 MG: 25 TABLET ORAL at 08:09

## 2024-09-25 RX ADMIN — PREDNISOLONE ACETATE 1 DROP: 10 SUSPENSION/ DROPS OPHTHALMIC at 09:09

## 2024-09-25 RX ADMIN — INSULIN ASPART 2 UNITS: 100 INJECTION, SOLUTION INTRAVENOUS; SUBCUTANEOUS at 09:09

## 2024-09-25 RX ADMIN — HYDRALAZINE HYDROCHLORIDE 25 MG: 25 TABLET ORAL at 02:09

## 2024-09-25 NOTE — PROGRESS NOTES
INPATIENT NEPHROLOGY progress  [facility] NEPHROLOGY    Tabby Howard  09/25/2024    Reason for consultation:  esrd    Chief Complaint:   Chief Complaint   Patient presents with    Flank Pain     Left side flank pain, N&V starting this morning     History of Present Illness:    Per H and P  Tabby Howard is a 34 year old female with a previous medical history of ESRD on dialysis Tue/Thur/Sat, chronic abdominal pain, HTN, Type II Diabetes, who presented to the ED  for left-sided flank pain. HPI is limited to chart review and ED note due to patient not being cooperative.  Per chart review, patient has multiple similar episodes in the past and has had multiple previous CT scans performed.  Also has had multiple hospitalizations for DKA in the past. Patient reports last dialysis was Monday 9/9/24 due to them scheduling her that day. She was unable to attend her normal Thursday session due to severe abdominal pain and vomiting. Of note she has had 5 ER visits over the past five days and most recent admission was on 9/10/24. She was admitted for DKA and insulin drip discontinued the next day. She received dialysis along with an EGD that showed mild gastritis. Initial ED evaluation showed elevated anion gap and betahydroxybutyrate but low normal PH.      9/21  pressures low, but stable.  Lab results reviewed.  Seen today on dialysis.  Has hypotension, but alert and oriented.    9/22  pressures actually high now--will resume home hydralazine at low dose.  Lab results reviewed.  Still w/same pain, no other complaints.  0/23 VSS. Plan HD tomorrow or PRN.  9/24 VSS. HD  today. OK to dc home if stable after HD.  9/25 VSS. HD tomorrow. Ongoing symptoms with n/v, pain...    Plan of Care:    ESRD on HD TTS  Hyponatremia  Acidosis  --continue dialysis per routine  --renal dose medication per routine  --pt with 17 hospital admissions in last 12 months    Anemia of CKD  --erythropoiesis stimulating agent with renal replacement  therapy    Secondary HPT  --renal diet as tolerated  --continue binders with meals if tolerated    Hypertension  --uf with hd as tolerated  --low salt diet as tolerated  --continue home medication    DM  Gastroparesis   --management per primary team    Thank you for allowing us to participate in this patient's care. We will continue to follow.    Vital Signs:  Temp Readings from Last 3 Encounters:   24 98.3 °F (36.8 °C) (Oral)   24 98.4 °F (36.9 °C) (Oral)   24 98 °F (36.7 °C)       Pulse Readings from Last 3 Encounters:   24 (!) 118   24 94   24 92       BP Readings from Last 3 Encounters:   24 (!) 166/94   24 122/75   24 (!) 180/99       Weight:  Wt Readings from Last 3 Encounters:   24 54.5 kg (120 lb 2.4 oz)   24 52.6 kg (116 lb)   24 52.6 kg (116 lb)       Past Medical & Surgical History:  Past Medical History:   Diagnosis Date    ESRD on hemodialysis 2022    Gastritis 2022    EGD was 22    Gastroparesis 2022    has not had Emptying study    Heart failure with preserved ejection fraction 2022    EF 55% on 3/22    History of supraventricular tachycardia     Hyperkalemia 2022    Hypertensive emergency 2022    Sickle cell trait 2022    Type 2 diabetes mellitus        Past Surgical History:   Procedure Laterality Date     SECTION      x 3    COLONOSCOPY      COLONOSCOPY N/A 2022    Procedure: COLONOSCOPY;  Surgeon: Jagdeep Cedeno MD;  Location: [facility];  Service: Endoscopy;  Laterality: N/A;    DESTRUCTION, CILIARY BODY, USING LASER Left 2024    Procedure: Cyclophotocoagulation G-probe, retrobulbar chlorpromazine left eye;  Surgeon: Fidel Wise MD;  Location: [address];  Service: Ophthalmology;  Laterality: Left;    ENDOSCOPIC ULTRASOUND OF UPPER GASTROINTESTINAL TRACT N/A 2023    Procedure: ULTRASOUND, UPPER GI TRACT, ENDOSCOPIC;  Surgeon: Micky Paredes  MD TIFFANIE;  Location: [facility];  Service: Endoscopy;  Laterality: N/A;    ESOPHAGOGASTRODUODENOSCOPY N/A 10/18/2019    Procedure: ESOPHAGOGASTRODUODENOSCOPY (EGD);  Surgeon: Gianluca Mendez MD;  Location: [facility];  Service: Endoscopy;  Laterality: N/A;    ESOPHAGOGASTRODUODENOSCOPY N/A 08/24/2022    Procedure: EGD (ESOPHAGOGASTRODUODENOSCOPY);  Surgeon: Micky Paredes III, MD;  Location: [facility];  Service: Endoscopy;  Laterality: N/A;    ESOPHAGOGASTRODUODENOSCOPY N/A 12/05/2022    Procedure: EGD (ESOPHAGOGASTRODUODENOSCOPY);  Surgeon: Marcelo Zhong MD;  Location: [facility];  Service: Endoscopy;  Laterality: N/A;    ESOPHAGOGASTRODUODENOSCOPY N/A 9/13/2024    Procedure: EGD (ESOPHAGOGASTRODUODENOSCOPY);  Surgeon: Marcelo Zhong MD;  Location: [facility];  Service: Endoscopy;  Laterality: N/A;    EYE SURGERY      INSERTION, CATHETER, TUNNELED N/A 06/17/2023    Procedure: Insertion,catheter,tunneled;  Surgeon: Carlos Thurman Jr., MD;  Location: [facility];  Service: General;  Laterality: N/A;    LAPAROSCOPIC CHOLECYSTECTOMY N/A 07/30/2022    Procedure: CHOLECYSTECTOMY, LAPAROSCOPIC;  Surgeon: Grey Perez MD;  Location: [facility] OR;  Service: General;  Laterality: N/A;    PLACEMENT OF DUAL-LUMEN VASCULAR CATHETER Left 07/12/2022    Procedure: INSERTION-CATHETER-JOSEPH;  Surgeon: Dionte Gan MD;  Location: [facility] OR;  Service: General;  Laterality: Left;    PLACEMENT OF DUAL-LUMEN VASCULAR CATHETER Right 07/26/2022    Procedure: INSERTION-CATHETER-Hemosplit;  Surgeon: Dionte Gan MD;  Location: [facility] OR;  Service: General;  Laterality: Right;       Past Social History:  Social History     Socioeconomic History    Marital status:    Tobacco Use    Smoking status: Never    Smokeless tobacco: Never   Substance and Sexual Activity    Alcohol use: Not Currently    Drug use: No    Sexual activity: Not Currently     Partners: Male     Birth control/protection: I.U.D.     Social Determinants of  Health     Financial Resource Strain: Low Risk  (8/29/2024)    Overall Financial Resource Strain (CARDIA)     Difficulty of Paying Living Expenses: Not hard at all   Recent Concern: Financial Resource Strain - Medium Risk (8/25/2024)    Overall Financial Resource Strain (CARDIA)     Difficulty of Paying Living Expenses: Somewhat hard   Food Insecurity: No Food Insecurity (8/29/2024)    Hunger Vital Sign     Worried About Running Out of Food in the Last Year: Never true     Ran Out of Food in the Last Year: Never true   Recent Concern: Food Insecurity - Food Insecurity Present (8/25/2024)    Hunger Vital Sign     Worried About Running Out of Food in the Last Year: Often true     Ran Out of Food in the Last Year: Often true   Transportation Needs: No Transportation Needs (8/29/2024)    TRANSPORTATION NEEDS     Transportation : No   Physical Activity: Sufficiently Active (8/29/2024)    Exercise Vital Sign     Days of Exercise per Week: 5 days     Minutes of Exercise per Session: 30 min   Recent Concern: Physical Activity - Inactive (8/25/2024)    Exercise Vital Sign     Days of Exercise per Week: 0 days     Minutes of Exercise per Session: 0 min   Stress: No Stress Concern Present (8/29/2024)    [employer] - Occupational Stress Questionnaire     Feeling of Stress : Not at all   Recent Concern: Stress - Stress Concern Present (8/25/2024)    [employer] - Occupational Stress Questionnaire     Feeling of Stress : Rather much   Housing Stability: Low Risk  (8/29/2024)    Housing Stability Vital Sign     Unable to Pay for Housing in the Last Year: No     Homeless in the Last Year: No   Recent Concern: Housing Stability - High Risk (8/25/2024)    Housing Stability Vital Sign     Unable to Pay for Housing in the Last Year: Yes     Homeless in the Last Year: No       Medications:  No current facility-administered medications on file prior to encounter.     Current  Outpatient Medications on File Prior to Encounter   Medication Sig Dispense Refill    albuterol (VENTOLIN HFA) 90 mcg/actuation inhaler Inhale 2 puffs every 4 hours by inhalation route. 8 g 2    apixaban (ELIQUIS) 5 mg Tab Take 1 tablet (5 mg total) by mouth 2 (two) times daily. Patient w/ thrombocytopenia <50, so held during admission, follow-up with hematologist about restarting 180 tablet 1    brinzolamide (AZOPT) 1 % ophthalmic suspension Place1 drop in left eye 3 times a day for 30 days 15 mL 6    cetirizine (ZYRTEC) 10 MG tablet Take 1 tablet every day by oral route. 30 tablet 0    FLUoxetine 40 MG capsule Take 1 capsule every day by oral route. 30 capsule 2    fluticasone propionate (FLONASE ALLERGY RELIEF) 50 mcg/actuation nasal spray [place] 1 spray every day by intranasal route. 16 g 2    gabapentin (NEURONTIN) 300 MG capsule Take 2 capsules twice a day by oral route for 90 days. 360 capsule 1    insulin aspart U-100 (NOVOLOG) 100 unit/mL (3 mL) InPn pen Inject 8 Units into the skin 3 (three) times daily with meals. 15 mL 0    insulin glargine U-100, Lantus, (LANTUS SOLOSTAR U-100 INSULIN) 100 unit/mL (3 mL) InPn pen Inject 22 units every day by subcutaneous route in the evening for 30 days, for diabetes. 15 mL 2    levETIRAcetam (KEPPRA) 500 MG Tab Take 1 tablet (500 mg total) by mouth 2 (two) times daily. 60 tablet 11    ondansetron (ZOFRAN-ODT) 4 MG TbDL Place 1 tablet (4 mg) on or under the tongue every 8 hours for 10 days. 24 tablet 2    pantoprazole (PROTONIX) 40 MG tablet Take 1 tablet (40 mg total) by mouth once daily. 30 tablet 0    prednisoLONE acetate (PRED FORTE) 1 % DrpS Place 1 drop into the left eye 2 (two) times daily. 5 mL 3    promethazine (PHENERGAN) 25 MG tablet Take 1 tablet (25 mg total) by mouth every 6 (six) hours as needed. 15 tablet 0    sevelamer carbonate (RENVELA) 800 mg Tab Take 1 tablet (800 mg total) by mouth 3 (three) times daily with meals. 180 tablet 11    sucralfate  "(CARAFATE) 1 gram tablet Take 1 tablet (1 g total) by mouth 4 (four) times daily. 100 tablet 1    atropine 1% (ISOPTO ATROPINE) 1 % Drop Place 1 drop into the left eye 2 (two) times a day. 15 mL 3    blood sugar diagnostic (TRUE METRIX GLUCOSE TEST STRIP) Strp Use 1 strip to check blood glucose three times daily 100 each 11    blood-glucose meter (TRUE METRIX GLUCOSE METER) Misc Use to check blood glucose 1 each 0    blood-glucose meter,continuous Misc USE WITH SENSORS 1 each 0    blood-glucose sensor (DEXCOM G7 SENSOR) Heather Use as directed for CGM. Change sensor every 10 days 3 each 0    blood-glucose sensor Heather CHANGE EVERY 10 DAYS 3 each 0    brimonidine 0.15 % OPTH DROP (ALPHAGAN) 0.15 % ophthalmic solution Place 1 drop into both eyes 3 (three) times daily. 15 mL 3    busPIRone (BUSPAR) 10 MG tablet Take 1 tablet (10 mg total) by mouth 3 (three) times daily as needed (anxiety). 60 tablet 5    dorzolamide-timolol 2-0.5% (COSOPT) 22.3-6.8 mg/mL ophthalmic solution place 1 drop in left eye twice a day  for 30 days 10 mL 0    lancets (TRUEPLUS LANCETS) 33 gauge Misc Use 1 to check blood glucose three times daily 100 each 11    pen needle, diabetic 31 gauge x 3/16" Ndle Use as directed with insulin once daily 100 each 0    timolol maleate 0.5% (TIMOPTIC) 0.5 % Drop Place 1 drop in left eye 2 times a day for 30 days 5 mL 6    [DISCONTINUED] atenoloL (TENORMIN) 50 MG tablet Take 1 tablet (50 mg total) by mouth every other day. 45 tablet 3    [DISCONTINUED] insulin detemir U-100, Levemir, (LEVEMIR FLEXTOUCH U100 INSULIN) 100 unit/mL (3 mL) InPn pen Inject 22 units every day by subcutaneous route in the evening for 90 days. 18 mL 1    [DISCONTINUED] omeprazole (PRILOSEC) 20 MG capsule Take 2 capsules (40 mg total) by mouth once daily. for 10 days 20 capsule 0     Scheduled Meds:   apixaban  5 mg Oral BID    epoetin teresa-epbx  50 Units/kg Intravenous Every Tues, Thurs, Sat    FLUoxetine  40 mg Oral Daily    hydrALAZINE  " "25 mg Oral Q8H    insulin glargine U-100 (Lantus)  20 Units Subcutaneous QHS    levETIRAcetam  500 mg Oral BID    pantoprazole  40 mg Oral Daily    prednisoLONE acetate  1 drop Left Eye BID    sevelamer carbonate  800 mg Oral TID WM    sucralfate  1 g Oral QID     Continuous Infusions:  PRN Meds:.  Current Facility-Administered Medications:     acetaminophen, 650 mg, Oral, Q4H PRN    albuterol-ipratropium, 3 mL, Nebulization, Q6H PRN    aluminum-magnesium hydroxide-simethicone, 30 mL, Oral, QID PRN    dextrose 10%, 12.5 g, Intravenous, PRN    dextrose 10%, 25 g, Intravenous, PRN    HYDROcodone-acetaminophen, 1 tablet, Oral, Q4H PRN    HYDROmorphone, 1 mg, Intravenous, Q6H PRN    insulin aspart U-100, 0-10 Units, Subcutaneous, QID (AC + HS) PRN    lorazepam, 1 mg, Intravenous, Q6H PRN    naloxone, 0.02 mg, Intravenous, PRN    promethazine (PHENERGAN) 12.5 mg in 0.9% NaCl 50 mL IVPB, 12.5 mg, Intravenous, Q6H PRN    simethicone, 1 tablet, Oral, QID PRN    sodium chloride 0.9%, 10 mL, Intravenous, Q12H PRN    Allergies:  Droperidol, Haldol [haloperidol lactate], Penicillins, and Droperidol (bulk)    Past Family History:  Reviewed; refer to Hospitalist Admission Note    Review of Systems:  Review of Systems - All 14 systems reviewed and negative, except as noted in HPI    Physical Exam:    BP (!) 166/94 (BP Location: Right arm)   Pulse (!) 118   Temp 98.3 °F (36.8 °C) (Oral)   Resp 18   Ht 5' 2" (1.575 m)   Wt 54.5 kg (120 lb 2.4 oz)   LMP  (LMP Unknown) Comment: pt states last mentral cycle was 3yrs ago  SpO2 98%   Breastfeeding No   BMI 21.98 kg/m²     General Appearance:    Alert, cooperative, no distress, appears stated age   Head:    Normocephalic, without obvious abnormality, atraumatic   Eyes:    PER, conjunctiva/corneas clear, EOM's intact in both eyes        Throat:   Lips, mucosa, and tongue normal; teeth and gums normal   Back:     Symmetric, no curvature, ROM normal, no CVA tenderness   Lungs:     " "Clear to auscultation bilaterally, respirations unlabored   Chest wall:    No tenderness or deformity   Heart:    Regular rate and rhythm, S1 and S2 normal, no murmur, rub   or gallop   Abdomen:     Soft, non-tender, bowel sounds active all four quadrants,     no masses, no organomegaly   Extremities:   Extremities normal, atraumatic, no cyanosis or edema   Pulses:   2+ and symmetric all extremities   MSK:   No joint or muscle swelling, tenderness or deformity   Skin:   Skin color, texture, turgor normal, no rashes or lesions   Neurologic:   CNII-XII intact, normal strength and sensation       Throughout.  No flap     Results:  Lab Results   Component Value Date     09/25/2024    K 4.3 09/25/2024     09/25/2024    CO2 19 (L) 09/25/2024    BUN 33 (H) 09/25/2024    CREATININE 6.7 (H) 09/25/2024    CALCIUM 10.3 09/25/2024    ANIONGAP 17 (H) 09/25/2024    ESTGFRAFRICA 19 (L) 09/03/2022    EGFRNONAA 18 (A) 07/31/2022       Lab Results   Component Value Date    CALCIUM 10.3 09/25/2024    PHOS 2.5 (L) 09/22/2024       No results for input(s): "WBC", "RBC", "HGB", "HCT", "PLT", "MCV", "MCH", "MCHC" in the last 24 hours.    Mando Benitez MD    [facility]  [phone]     "

## 2024-09-25 NOTE — NURSING
KAYLA Thornton NP notified  of /121, po hydralazine given. Patient vomiting ,prn phenergan given at 1800. New order received for IV hydralazine & IV reglan .

## 2024-09-25 NOTE — CARE UPDATE
09/24/24 1935   Patient Assessment/Suction   Level of Consciousness (AVPU) alert   Respiratory Effort Normal;Unlabored   Expansion/Accessory Muscles/Retractions expansion symmetric;no retractions;no use of accessory muscles   All Lung Fields Breath Sounds clear   PRE-TX-O2   Device (Oxygen Therapy) room air   SpO2 99 %   Pulse Oximetry Type Intermittent   Pulse (!) 112   Resp 18   Aerosol Therapy   $ Aerosol Therapy Charges PRN treatment not required   Respiratory Treatment Status (SVN) PRN treatment not required

## 2024-09-25 NOTE — PLAN OF CARE
Plan of care reviewed with patient. Safety maintained with bed in lowest position, wheels locked, side rails up x3 and call button in reach. Patient instructed to call for any assistance.     Problem: Adult Inpatient Plan of Care  Goal: Plan of Care Review  Outcome: Progressing  Goal: Patient-Specific Goal (Individualized)  Outcome: Progressing  Goal: Absence of Hospital-Acquired Illness or Injury  Outcome: Progressing  Goal: Optimal Comfort and Wellbeing  Outcome: Progressing  Goal: Readiness for Transition of Care  Outcome: Progressing     Problem: Diabetes Comorbidity  Goal: Blood Glucose Level Within Targeted Range  Outcome: Progressing     Problem: Wound  Goal: Optimal Coping  Outcome: Progressing  Goal: Optimal Functional Ability  Outcome: Progressing  Goal: Absence of Infection Signs and Symptoms  Outcome: Progressing  Goal: Improved Oral Intake  Outcome: Progressing  Goal: Optimal Pain Control and Function  Outcome: Progressing  Goal: Skin Health and Integrity  Outcome: Progressing  Goal: Optimal Wound Healing  Outcome: Progressing     Problem: Fall Injury Risk  Goal: Absence of Fall and Fall-Related Injury  Outcome: Progressing

## 2024-09-25 NOTE — SUBJECTIVE & OBJECTIVE
Interval History: Still complaining of left flank pain. Has n/v.    Review of Systems  Objective:     Vital Signs (Most Recent):  Temp: 98.3 °F (36.8 °C) (09/25/24 0800)  Pulse: (!) 118 (09/25/24 0800)  Resp: 18 (09/25/24 0826)  BP: (!) 166/94 (09/25/24 0800)  SpO2: 98 % (09/25/24 0800) Vital Signs (24h Range):  Temp:  [97.7 °F (36.5 °C)-98.4 °F (36.9 °C)] 98.3 °F (36.8 °C)  Pulse:  [111-121] 118  Resp:  [17-20] 18  SpO2:  [98 %-100 %] 98 %  BP: (134-212)/() 166/94     Weight: 54.5 kg (120 lb 2.4 oz)  Body mass index is 21.98 kg/m².    Intake/Output Summary (Last 24 hours) at 9/25/2024 1115  Last data filed at 9/25/2024 0600  Gross per 24 hour   Intake 360 ml   Output --   Net 360 ml         Physical Exam  Vitals reviewed.   Constitutional:       Appearance: She is ill-appearing.      Comments: Chronically ill appearing   HENT:      Head: Normocephalic and atraumatic.      Nose: Nose normal.      Mouth/Throat:      Mouth: Mucous membranes are dry.      Pharynx: Oropharynx is clear.   Eyes:      Conjunctiva/sclera:      Left eye: Hemorrhage present.   Cardiovascular:      Rate and Rhythm: Regular rhythm.      Pulses:           Radial pulses are 2+ on the right side and 2+ on the left side.        Dorsalis pedis pulses are 2+ on the right side and 2+ on the left side.      Arteriovenous access: Left arteriovenous access is present.  Pulmonary:      Effort: Pulmonary effort is normal.      Breath sounds: Normal breath sounds.   Abdominal:      General: Bowel sounds are normal.      Palpations: Abdomen is soft.      Tenderness: There is generalized abdominal tenderness.   Musculoskeletal:         General: Normal range of motion.      Cervical back: Normal range of motion and neck supple.      Right lower leg: No edema.      Left lower leg: No edema.   Skin:     General: Skin is warm and dry.      Capillary Refill: Capillary refill takes less than 2 seconds.   Neurological:      General: No focal deficit present.  "     Mental Status: She is alert and oriented to person, place, and time. Mental status is at baseline.   Psychiatric:         Attention and Perception: Attention normal.         Behavior: Behavior is withdrawn       Significant Labs: All pertinent labs within the past 24 hours have been reviewed.  CBC:   No results for input(s): "WBC", "HGB", "HCT", "PLT" in the last 48 hours.    CMP:   Recent Labs   Lab 09/24/24  0725 09/25/24  0402   * 140   K 4.6 4.3   CL 99 104   CO2 20* 19*   GLU 90 158*   BUN 56* 33*   CREATININE 8.5* 6.7*   CALCIUM 10.0 10.3   PROT 6.6 7.3   ALBUMIN 3.1* 3.3*   BILITOT 0.6 1.1*   ALKPHOS 81 85   AST 10 14   ALT 8* 6*   ANIONGAP 14 17*       Significant Imaging: I have reviewed all pertinent imaging results/findings within the past 24 hours.  Physical Exam  "

## 2024-09-25 NOTE — CARE UPDATE
09/25/24 0752   Patient Assessment/Suction   Level of Consciousness (AVPU) alert   Respiratory Effort Unlabored;Normal   Expansion/Accessory Muscles/Retractions no retractions   All Lung Fields Breath Sounds clear   Rhythm/Pattern, Respiratory pattern regular;depth regular   PRE-TX-O2   Device (Oxygen Therapy) room air   SpO2 100 %   Pulse Oximetry Type Intermittent   $ Pulse Oximetry - Multiple Charge Pulse Oximetry - Multiple   Pulse (!) 113   Resp 17   Aerosol Therapy   $ Aerosol Therapy Charges PRN treatment not required   Respiratory Treatment Status (SVN) PRN treatment not required

## 2024-09-25 NOTE — PLAN OF CARE
Problem: Hemodialysis  Goal: Safe, Effective Therapy Delivery  Outcome: Progressing  Goal: Effective Tissue Perfusion  Outcome: Progressing  Goal: Absence of Infection Signs and Symptoms  Outcome: Progressing     Problem: Adult Inpatient Plan of Care  Goal: Plan of Care Review  Outcome: Progressing  Goal: Patient-Specific Goal (Individualized)  Outcome: Progressing  Goal: Absence of Hospital-Acquired Illness or Injury  Outcome: Progressing  Goal: Optimal Comfort and Wellbeing  Outcome: Progressing  Goal: Readiness for Transition of Care  Outcome: Progressing     Problem: Diabetes Comorbidity  Goal: Blood Glucose Level Within Targeted Range  Outcome: Progressing     Problem: Fall Injury Risk  Goal: Absence of Fall and Fall-Related Injury  Outcome: Progressing   Plan of care reviewed with patient, verbalized understanding.  SR on telemetry. HS bg 158, declined scheduled insulin . Medicated for pain and anxiety  during night, moderate relief obtained. Remains free from falls, injury. Instructed to call for assistance as needed ,verbalized understanding. Bed in low & locked position. Call light in reach, bed alarm on .

## 2024-09-25 NOTE — PROGRESS NOTES
Critical access hospital Medicine  Progress Note    Patient Name: Tabby Howard  MRN: [id number]  Patient Class: IP- Inpatient   Admission Date: 9/19/2024  Length of Stay: 2 days  Attending Physician: Alice Fish MD  Primary Care Provider: Melony Kim NP        Subjective:     Principal Problem:High anion gap metabolic acidosis        HPI:  Tabby Howard is a 34 year old female with a previous medical history of ESRD on dialysis Tue/Thur/Sat, chronic abdominal pain, HTN, Type II Diabetes, who presented to the ED  for left-sided flank pain. HPI is limited to chart review and ED note due to patient not being cooperative.  Per chart review, patient has multiple similar episodes in the past and has had multiple previous CT scans performed.  Also has had multiple hospitalizations for DKA in the past. Patient reports last dialysis was Monday 9/9/24 due to them scheduling her that day. She was unable to attend her normal Thursday session due to severe abdominal pain and vomiting. Of note she has had 5 ER visits over the past five days and most recent admission was on 9/10/24. She was admitted for DKA and insulin drip discontinued the next day. She received dialysis along with an EGD that was negative for any acute process. Initial ED evaluation showed elevated anion gap and betahydroxybutyrate but low normal PH. Dr. Bradford consulted and he placed orders for dialysis. Pain and nausea controlled with ativan and phenergan. Patient admitted by hospital medicine for further evaluation and management.     Overview/Hospital Course:  Patient is a 34 year old patient with diabetes and ESRD on HD, frequent hospital admissions for multiple complains, chronic abdominal pain. Patient was recently discharged few days ago. Patient came back with worsening pain on the left flank, nausea and vomiting. Patient is euglycemic. However beta hydroxybutyrate was elevated. Patient was hydrated. Acidosis improved.  The patient continued to c/o abdominal pain. Prn pain meds were given. The patient developed n/v once again, and prn antiemetics were given.    Interval History: Still complaining of left flank pain. Has n/v.    Review of Systems  Objective:     Vital Signs (Most Recent):  Temp: 98.3 °F (36.8 °C) (09/25/24 0800)  Pulse: (!) 118 (09/25/24 0800)  Resp: 18 (09/25/24 0826)  BP: (!) 166/94 (09/25/24 0800)  SpO2: 98 % (09/25/24 0800) Vital Signs (24h Range):  Temp:  [97.7 °F (36.5 °C)-98.4 °F (36.9 °C)] 98.3 °F (36.8 °C)  Pulse:  [111-121] 118  Resp:  [17-20] 18  SpO2:  [98 %-100 %] 98 %  BP: (134-212)/() 166/94     Weight: 54.5 kg (120 lb 2.4 oz)  Body mass index is 21.98 kg/m².    Intake/Output Summary (Last 24 hours) at 9/25/2024 1115  Last data filed at 9/25/2024 0600  Gross per 24 hour   Intake 360 ml   Output --   Net 360 ml         Physical Exam  Vitals reviewed.   Constitutional:       Appearance: She is ill-appearing.      Comments: Chronically ill appearing   HENT:      Head: Normocephalic and atraumatic.      Nose: Nose normal.      Mouth/Throat:      Mouth: Mucous membranes are dry.      Pharynx: Oropharynx is clear.   Eyes:      Conjunctiva/sclera:      Left eye: Hemorrhage present.   Cardiovascular:      Rate and Rhythm: Regular rhythm.      Pulses:           Radial pulses are 2+ on the right side and 2+ on the left side.        Dorsalis pedis pulses are 2+ on the right side and 2+ on the left side.      Arteriovenous access: Left arteriovenous access is present.  Pulmonary:      Effort: Pulmonary effort is normal.      Breath sounds: Normal breath sounds.   Abdominal:      General: Bowel sounds are normal.      Palpations: Abdomen is soft.      Tenderness: There is generalized abdominal tenderness.   Musculoskeletal:         General: Normal range of motion.      Cervical back: Normal range of motion and neck supple.      Right lower leg: No edema.      Left lower leg: No edema.   Skin:     General:  "Skin is warm and dry.      Capillary Refill: Capillary refill takes less than 2 seconds.   Neurological:      General: No focal deficit present.      Mental Status: She is alert and oriented to person, place, and time. Mental status is at baseline.   Psychiatric:         Attention and Perception: Attention normal.         Behavior: Behavior is withdrawn       Significant Labs: All pertinent labs within the past 24 hours have been reviewed.  CBC:   No results for input(s): "WBC", "HGB", "HCT", "PLT" in the last 48 hours.    CMP:   Recent Labs   Lab 09/24/24  0725 09/25/24  0402   * 140   K 4.6 4.3   CL 99 104   CO2 20* 19*   GLU 90 158*   BUN 56* 33*   CREATININE 8.5* 6.7*   CALCIUM 10.0 10.3   PROT 6.6 7.3   ALBUMIN 3.1* 3.3*   BILITOT 0.6 1.1*   ALKPHOS 81 85   AST 10 14   ALT 8* 6*   ANIONGAP 14 17*       Significant Imaging: I have reviewed all pertinent imaging results/findings within the past 24 hours.  Physical Exam    Assessment/Plan:      * High anion gap metabolic acidosis  Resolved          Chronic abdominal pain  Prn pain meds and nausea meds      ESRD (end stage renal disease) on dialysis  Dialysis as per nephrology      Diabetes  SSI    Seizures  Home meds      Hypertension  Home meds      VTE Risk Mitigation (From admission, onward)           Ordered     apixaban tablet 5 mg  2 times daily         09/19/24 1751     IP VTE HIGH RISK PATIENT  Once         09/19/24 1624     Place sequential compression device  Until discontinued         09/19/24 1624                    Discharge Planning   TATIANA: 9/27/2024     Code Status: Full Code   Is the patient medically ready for discharge?:     Reason for patient still in hospital (select all that apply): Patient trending condition  Discharge Plan A: Home with family                  Alice Fish MD, MD  Department of Hospital Medicine   [facility]/Surg    "

## 2024-09-26 LAB
ALBUMIN SERPL BCP-MCNC: 3.2 G/DL (ref 3.5–5.2)
ALP SERPL-CCNC: 80 U/L (ref 55–135)
ALT SERPL W/O P-5'-P-CCNC: 9 U/L (ref 10–44)
ANION GAP SERPL CALC-SCNC: 15 MMOL/L (ref 8–16)
AST SERPL-CCNC: 15 U/L (ref 10–40)
BILIRUB SERPL-MCNC: 0.7 MG/DL (ref 0.1–1)
BUN SERPL-MCNC: 43 MG/DL (ref 6–20)
CALCIUM SERPL-MCNC: 10.1 MG/DL (ref 8.7–10.5)
CHLORIDE SERPL-SCNC: 102 MMOL/L (ref 95–110)
CO2 SERPL-SCNC: 21 MMOL/L (ref 23–29)
CREAT SERPL-MCNC: 8.4 MG/DL (ref 0.5–1.4)
EST. GFR  (NO RACE VARIABLE): 6 ML/MIN/1.73 M^2
GLUCOSE SERPL-MCNC: 144 MG/DL (ref 70–110)
POCT GLUCOSE: 111 MG/DL (ref 70–110)
POCT GLUCOSE: 122 MG/DL (ref 70–110)
POCT GLUCOSE: 128 MG/DL (ref 70–110)
POCT GLUCOSE: 182 MG/DL (ref 70–110)
POCT GLUCOSE: 43 MG/DL (ref 70–110)
POCT GLUCOSE: 66 MG/DL (ref 70–110)
POTASSIUM SERPL-SCNC: 4.7 MMOL/L (ref 3.5–5.1)
PROT SERPL-MCNC: 6.8 G/DL (ref 6–8.4)
SODIUM SERPL-SCNC: 138 MMOL/L (ref 136–145)

## 2024-09-26 PROCEDURE — 25000003 PHARM REV CODE 250: Performed by: INTERNAL MEDICINE

## 2024-09-26 PROCEDURE — 63600175 PHARM REV CODE 636 W HCPCS: Performed by: INTERNAL MEDICINE

## 2024-09-26 PROCEDURE — 36415 COLL VENOUS BLD VENIPUNCTURE: CPT | Performed by: INTERNAL MEDICINE

## 2024-09-26 PROCEDURE — 25000003 PHARM REV CODE 250

## 2024-09-26 PROCEDURE — 80053 COMPREHEN METABOLIC PANEL: CPT | Performed by: INTERNAL MEDICINE

## 2024-09-26 PROCEDURE — 11000001 HC ACUTE MED/SURG PRIVATE ROOM

## 2024-09-26 PROCEDURE — 63600175 PHARM REV CODE 636 W HCPCS

## 2024-09-26 PROCEDURE — 94761 N-INVAS EAR/PLS OXIMETRY MLT: CPT

## 2024-09-26 PROCEDURE — 25000003 PHARM REV CODE 250: Performed by: NURSE PRACTITIONER

## 2024-09-26 PROCEDURE — 90935 HEMODIALYSIS ONE EVALUATION: CPT

## 2024-09-26 PROCEDURE — 63600175 PHARM REV CODE 636 W HCPCS: Mod: JZ,JG | Performed by: INTERNAL MEDICINE

## 2024-09-26 RX ADMIN — EPOETIN ALFA-EPBX 2360 UNITS: 4000 INJECTION, SOLUTION INTRAVENOUS; SUBCUTANEOUS at 11:09

## 2024-09-26 RX ADMIN — SUCRALFATE 1 G: 1 TABLET ORAL at 05:09

## 2024-09-26 RX ADMIN — HYDRALAZINE HYDROCHLORIDE 25 MG: 25 TABLET ORAL at 05:09

## 2024-09-26 RX ADMIN — HYDROCODONE BITARTRATE AND ACETAMINOPHEN 1 TABLET: 5; 325 TABLET ORAL at 07:09

## 2024-09-26 RX ADMIN — LORAZEPAM 1 MG: 2 INJECTION INTRAMUSCULAR; INTRAVENOUS at 07:09

## 2024-09-26 RX ADMIN — SUCRALFATE 1 G: 1 TABLET ORAL at 08:09

## 2024-09-26 RX ADMIN — PREDNISOLONE ACETATE 1 DROP: 10 SUSPENSION/ DROPS OPHTHALMIC at 08:09

## 2024-09-26 RX ADMIN — SEVELAMER CARBONATE 800 MG: 800 TABLET, FILM COATED ORAL at 07:09

## 2024-09-26 RX ADMIN — ACETAMINOPHEN 650 MG: 325 TABLET ORAL at 04:09

## 2024-09-26 RX ADMIN — HYDROMORPHONE HYDROCHLORIDE 1 MG: 1 INJECTION, SOLUTION INTRAMUSCULAR; INTRAVENOUS; SUBCUTANEOUS at 11:09

## 2024-09-26 RX ADMIN — APIXABAN 5 MG: 2.5 TABLET, FILM COATED ORAL at 01:09

## 2024-09-26 RX ADMIN — SEVELAMER CARBONATE 800 MG: 800 TABLET, FILM COATED ORAL at 01:09

## 2024-09-26 RX ADMIN — LEVETIRACETAM 500 MG: 250 TABLET, FILM COATED ORAL at 08:09

## 2024-09-26 RX ADMIN — FLUOXETINE HYDROCHLORIDE 40 MG: 20 CAPSULE ORAL at 01:09

## 2024-09-26 RX ADMIN — HYDRALAZINE HYDROCHLORIDE 25 MG: 25 TABLET ORAL at 08:09

## 2024-09-26 RX ADMIN — SUCRALFATE 1 G: 1 TABLET ORAL at 01:09

## 2024-09-26 RX ADMIN — PREDNISOLONE ACETATE 1 DROP: 10 SUSPENSION/ DROPS OPHTHALMIC at 09:09

## 2024-09-26 RX ADMIN — HYDROMORPHONE HYDROCHLORIDE 1 MG: 1 INJECTION, SOLUTION INTRAMUSCULAR; INTRAVENOUS; SUBCUTANEOUS at 05:09

## 2024-09-26 RX ADMIN — HYDROMORPHONE HYDROCHLORIDE 1 MG: 1 INJECTION, SOLUTION INTRAMUSCULAR; INTRAVENOUS; SUBCUTANEOUS at 04:09

## 2024-09-26 RX ADMIN — HYDROCODONE BITARTRATE AND ACETAMINOPHEN 1 TABLET: 5; 325 TABLET ORAL at 09:09

## 2024-09-26 RX ADMIN — SEVELAMER CARBONATE 800 MG: 800 TABLET, FILM COATED ORAL at 05:09

## 2024-09-26 RX ADMIN — HYDROCODONE BITARTRATE AND ACETAMINOPHEN 1 TABLET: 5; 325 TABLET ORAL at 01:09

## 2024-09-26 RX ADMIN — LORAZEPAM 1 MG: 2 INJECTION INTRAMUSCULAR; INTRAVENOUS at 04:09

## 2024-09-26 RX ADMIN — HYDRALAZINE HYDROCHLORIDE 25 MG: 25 TABLET ORAL at 01:09

## 2024-09-26 RX ADMIN — LEVETIRACETAM 500 MG: 250 TABLET, FILM COATED ORAL at 01:09

## 2024-09-26 RX ADMIN — PANTOPRAZOLE SODIUM 40 MG: 40 TABLET, DELAYED RELEASE ORAL at 01:09

## 2024-09-26 RX ADMIN — APIXABAN 5 MG: 2.5 TABLET, FILM COATED ORAL at 08:09

## 2024-09-26 NOTE — SUBJECTIVE & OBJECTIVE
Interval History: Still complaining of left flank pain. Has n/v.    Review of Systems  Objective:     Vital Signs (Most Recent):  Temp: 97.7 °F (36.5 °C) (09/26/24 0910)  Pulse: 86 (09/26/24 1030)  Resp: 16 (09/26/24 0910)  BP: 110/69 (09/26/24 1030)  SpO2: 97 % (09/26/24 0910) Vital Signs (24h Range):  Temp:  [97.7 °F (36.5 °C)-98.4 °F (36.9 °C)] 97.7 °F (36.5 °C)  Pulse:  [] 86  Resp:  [14-22] 16  SpO2:  [94 %-100 %] 97 %  BP: (103-180)/() 110/69     Weight: 54.5 kg (120 lb 2.4 oz)  Body mass index is 21.98 kg/m².    Intake/Output Summary (Last 24 hours) at 9/26/2024 1053  Last data filed at 9/26/2024 0400  Gross per 24 hour   Intake 250 ml   Output 0 ml   Net 250 ml         Physical Exam  Vitals reviewed.   Constitutional:       Appearance: She is ill-appearing.      Comments: Chronically ill appearing   HENT:      Head: Normocephalic and atraumatic.      Nose: Nose normal.      Mouth/Throat:      Mouth: Mucous membranes are dry.      Pharynx: Oropharynx is clear.   Eyes:      Conjunctiva/sclera:      Left eye: Hemorrhage present.   Cardiovascular:      Rate and Rhythm: Regular rhythm.      Pulses:           Radial pulses are 2+ on the right side and 2+ on the left side.        Dorsalis pedis pulses are 2+ on the right side and 2+ on the left side.      Arteriovenous access: Left arteriovenous access is present.  Pulmonary:      Effort: Pulmonary effort is normal.      Breath sounds: Normal breath sounds.   Abdominal:      General: Bowel sounds are normal.      Palpations: Abdomen is soft.      Tenderness: There is generalized abdominal tenderness.   Musculoskeletal:         General: Normal range of motion.      Cervical back: Normal range of motion and neck supple.      Right lower leg: No edema.      Left lower leg: No edema.   Skin:     General: Skin is warm and dry.      Capillary Refill: Capillary refill takes less than 2 seconds.   Neurological:      General: No focal deficit present.       "Mental Status: She is alert and oriented to person, place, and time. Mental status is at baseline.   Psychiatric:         Attention and Perception: Attention normal.         Behavior: Behavior is withdrawn       Significant Labs: All pertinent labs within the past 24 hours have been reviewed.  CBC:   No results for input(s): "WBC", "HGB", "HCT", "PLT" in the last 48 hours.    CMP:   Recent Labs   Lab 09/25/24  0402 09/26/24  0420    138   K 4.3 4.7    102   CO2 19* 21*   * 144*   BUN 33* 43*   CREATININE 6.7* 8.4*   CALCIUM 10.3 10.1   PROT 7.3 6.8   ALBUMIN 3.3* 3.2*   BILITOT 1.1* 0.7   ALKPHOS 85 80   AST 14 15   ALT 6* 9*   ANIONGAP 17* 15       Significant Imaging: I have reviewed all pertinent imaging results/findings within the past 24 hours.  Physical Exam  "

## 2024-09-26 NOTE — PLAN OF CARE
Dialysis today 1.2L off, no acute distress noted, pain controlled, see MAR, safety measures in place  Problem: Hemodialysis  Goal: Safe, Effective Therapy Delivery  Outcome: Progressing     Problem: Hemodialysis  Goal: Absence of Infection Signs and Symptoms  Outcome: Progressing     Problem: Adult Inpatient Plan of Care  Goal: Absence of Hospital-Acquired Illness or Injury  Outcome: Progressing     Problem: Adult Inpatient Plan of Care  Goal: Optimal Comfort and Wellbeing  Outcome: Progressing     Problem: Diabetes Comorbidity  Goal: Blood Glucose Level Within Targeted Range  Outcome: Progressing     Problem: Wound  Goal: Absence of Infection Signs and Symptoms  Outcome: Progressing     Problem: Pain Acute  Goal: Optimal Pain Control and Function  Outcome: Progressing     Problem: Fall Injury Risk  Goal: Absence of Fall and Fall-Related Injury  Outcome: Progressing

## 2024-09-26 NOTE — NURSING
Patient will not lie still in bed    Notified RN of pt's c/o pain and requesting pain medication  RN to retrieve and administer when able  Pt educated on importance of lying still, in particular of keeping her access arm still while on hemodialysis, as needles could potentially infiltrate or dislodge   110

## 2024-09-26 NOTE — PROGRESS NOTES
[facility] Medicine  Progress Note    Patient Name: Tabby Howard  MRN: [id number]  Patient Class: IP- Inpatient   Admission Date: 9/19/2024  Length of Stay: 3 days  Attending Physician: Alice Fish MD  Primary Care Provider: Melony Kim NP        Subjective:     Principal Problem:High anion gap metabolic acidosis        HPI:  Tabby Howard is a 34 year old female with a previous medical history of ESRD on dialysis Tue/Thur/Sat, chronic abdominal pain, HTN, Type II Diabetes, who presented to the ED  for left-sided flank pain. HPI is limited to chart review and ED note due to patient not being cooperative.  Per chart review, patient has multiple similar episodes in the past and has had multiple previous CT scans performed.  Also has had multiple hospitalizations for DKA in the past. Patient reports last dialysis was Monday 9/9/24 due to them scheduling her that day. She was unable to attend her normal Thursday session due to severe abdominal pain and vomiting. Of note she has had 5 ER visits over the past five days and most recent admission was on 9/10/24. She was admitted for DKA and insulin drip discontinued the next day. She received dialysis along with an EGD that was negative for any acute process. Initial ED evaluation showed elevated anion gap and betahydroxybutyrate but low normal PH. Dr. Bradford consulted and he placed orders for dialysis. Pain and nausea controlled with ativan and phenergan. Patient admitted by hospital medicine for further evaluation and management.     Overview/Hospital Course:  Patient is a 34 year old patient with diabetes and ESRD on HD, frequent hospital admissions for multiple complains, chronic abdominal pain. Patient was recently discharged few days ago. Patient came back with worsening pain on the left flank, nausea and vomiting. Patient is euglycemic. However beta hydroxybutyrate was elevated. Patient was hydrated. Acidosis improved.  The patient continued to c/o abdominal pain. Prn pain meds were given. The patient developed n/v once again, and prn antiemetics were given.    Interval History: Still complaining of left flank pain. Has n/v.    Review of Systems  Objective:     Vital Signs (Most Recent):  Temp: 97.7 °F (36.5 °C) (09/26/24 0910)  Pulse: 86 (09/26/24 1030)  Resp: 16 (09/26/24 0910)  BP: 110/69 (09/26/24 1030)  SpO2: 97 % (09/26/24 0910) Vital Signs (24h Range):  Temp:  [97.7 °F (36.5 °C)-98.4 °F (36.9 °C)] 97.7 °F (36.5 °C)  Pulse:  [] 86  Resp:  [14-22] 16  SpO2:  [94 %-100 %] 97 %  BP: (103-180)/() 110/69     Weight: 54.5 kg (120 lb 2.4 oz)  Body mass index is 21.98 kg/m².    Intake/Output Summary (Last 24 hours) at 9/26/2024 1053  Last data filed at 9/26/2024 0400  Gross per 24 hour   Intake 250 ml   Output 0 ml   Net 250 ml         Physical Exam  Vitals reviewed.   Constitutional:       Appearance: She is ill-appearing.      Comments: Chronically ill appearing   HENT:      Head: Normocephalic and atraumatic.      Nose: Nose normal.      Mouth/Throat:      Mouth: Mucous membranes are dry.      Pharynx: Oropharynx is clear.   Eyes:      Conjunctiva/sclera:      Left eye: Hemorrhage present.   Cardiovascular:      Rate and Rhythm: Regular rhythm.      Pulses:           Radial pulses are 2+ on the right side and 2+ on the left side.        Dorsalis pedis pulses are 2+ on the right side and 2+ on the left side.      Arteriovenous access: Left arteriovenous access is present.  Pulmonary:      Effort: Pulmonary effort is normal.      Breath sounds: Normal breath sounds.   Abdominal:      General: Bowel sounds are normal.      Palpations: Abdomen is soft.      Tenderness: There is generalized abdominal tenderness.   Musculoskeletal:         General: Normal range of motion.      Cervical back: Normal range of motion and neck supple.      Right lower leg: No edema.      Left lower leg: No edema.   Skin:     General: Skin is  "warm and dry.      Capillary Refill: Capillary refill takes less than 2 seconds.   Neurological:      General: No focal deficit present.      Mental Status: She is alert and oriented to person, place, and time. Mental status is at baseline.   Psychiatric:         Attention and Perception: Attention normal.         Behavior: Behavior is withdrawn       Significant Labs: All pertinent labs within the past 24 hours have been reviewed.  CBC:   No results for input(s): "WBC", "HGB", "HCT", "PLT" in the last 48 hours.    CMP:   Recent Labs   Lab 09/25/24  0402 09/26/24  0420    138   K 4.3 4.7    102   CO2 19* 21*   * 144*   BUN 33* 43*   CREATININE 6.7* 8.4*   CALCIUM 10.3 10.1   PROT 7.3 6.8   ALBUMIN 3.3* 3.2*   BILITOT 1.1* 0.7   ALKPHOS 85 80   AST 14 15   ALT 6* 9*   ANIONGAP 17* 15       Significant Imaging: I have reviewed all pertinent imaging results/findings within the past 24 hours.  Physical Exam    Assessment/Plan:      * High anion gap metabolic acidosis  Resolved          Chronic abdominal pain  Prn pain meds and nausea meds      ESRD (end stage renal disease) on dialysis  Dialysis as per nephrology      Diabetes  SSI    Seizures  Home meds      Hypertension  Home meds      VTE Risk Mitigation (From admission, onward)           Ordered     apixaban tablet 5 mg  2 times daily         09/19/24 1751     IP VTE HIGH RISK PATIENT  Once         09/19/24 1624     Place sequential compression device  Until discontinued         09/19/24 1624                    Discharge Planning   TATIANA: 9/27/2024     Code Status: Full Code   Is the patient medically ready for discharge?:     Reason for patient still in hospital (select all that apply): Patient trending condition  Discharge Plan A: Home with family                  Alice Fish MD, MD  Department of Hospital Medicine   [facility] - Med/Surg    "

## 2024-09-26 NOTE — PROGRESS NOTES
HD tx tolerated    Net UF 1.2 L  Pt educated on HD tx and risks of moving access arm during tx    Pt stable     09/26/24 1230   Handoff Report   Received From Stephanie Mena   Given To Clair   Vital Signs   Temp 98 °F (36.7 °C)   Temp Source Oral   Pulse 90   Resp 16   SpO2 97 %   Pulse Oximetry Type Intermittent   Probe Placed On (Pulse Ox) finger   /61   BP Location Right arm   BP Method Automatic   Patient Position Lying   Assessments (Pre/Post)   Blood Liters Processed (BLP) 57.3   Transport Modality not applicable   Level of Consciousness (AVPU) responds to voice   Dialyzer Clearance mildly streaked        Hemodialysis AV Fistula Left upper arm   No placement date or time found.   Location: Left upper arm   Site Assessment Clean;Dry;Intact   Patency Present;Thrill;Bruit   Status Deaccessed   Flows Good   Dressing Intervention First dressing   Dressing Status Clean;Dry;Intact   Site Condition No complications   Dressing Gauze   Post-Hemodialysis Assessment   Rinseback Volume (mL) 250 mL   Blood Volume Processed (Liters) 57.3 L   Dialyzer Clearance Lightly streaked   Duration of Treatment 180 minutes   Additional Fluid Intake (mL) 500 mL   Total UF (mL) 1742 mL   Net Fluid Removal 1242   Patient Response to Treatment Tolerated   Post-Treatment Weight 50.6 kg (111 lb 8.8 oz)   Treatment Weight Change -1.2   Arterial bleeding stop time (min) 6 min   Venous bleeding stop time (min) 7 min   Post-Hemodialysis Comments Tx complete, pt stable

## 2024-09-26 NOTE — PROGRESS NOTES
INPATIENT NEPHROLOGY progress  [facility] NEPHROLOGY    Tabby Howard  09/26/2024    Reason for consultation:  esrd    Chief Complaint:   Chief Complaint   Patient presents with    Flank Pain     Left side flank pain, N&V starting this morning     History of Present Illness:    Per H and P  Tabby Howard is a 34 year old female with a previous medical history of ESRD on dialysis Tue/Thur/Sat, chronic abdominal pain, HTN, Type II Diabetes, who presented to the ED  for left-sided flank pain. HPI is limited to chart review and ED note due to patient not being cooperative.  Per chart review, patient has multiple similar episodes in the past and has had multiple previous CT scans performed.  Also has had multiple hospitalizations for DKA in the past. Patient reports last dialysis was Monday 9/9/24 due to them scheduling her that day. She was unable to attend her normal Thursday session due to severe abdominal pain and vomiting. Of note she has had 5 ER visits over the past five days and most recent admission was on 9/10/24. She was admitted for DKA and insulin drip discontinued the next day. She received dialysis along with an EGD that showed mild gastritis. Initial ED evaluation showed elevated anion gap and betahydroxybutyrate but low normal PH.      9/21  pressures low, but stable.  Lab results reviewed.  Seen today on dialysis.  Has hypotension, but alert and oriented.    9/22  pressures actually high now--will resume home hydralazine at low dose.  Lab results reviewed.  Still w/same pain, no other complaints.  0/23 VSS. Plan HD tomorrow or PRN.  9/24 VSS. HD  today. OK to dc home if stable after HD.  9/25 VSS. HD tomorrow. Ongoing symptoms with n/v, pain...  9/26 VSS. HH today tolerated, despite frequent moving due to pain/discomfort.    Plan of Care:    ESRD on HD TTS  Hyponatremia  Acidosis  --continue dialysis per routine  --renal dose medication per routine  --pt with 17 hospital admissions in last 12  months    Anemia of CKD  --erythropoiesis stimulating agent with renal replacement therapy    Secondary HPT  --renal diet as tolerated  --continue binders with meals if tolerated    Hypertension  --uf with hd as tolerated  --low salt diet as tolerated  --continue home medication    DM  Gastroparesis   --management per primary team    Thank you for allowing us to participate in this patient's care. We will continue to follow.    Vital Signs:  Temp Readings from Last 3 Encounters:   24 98 °F (36.7 °C) (Oral)   24 98.4 °F (36.9 °C) (Oral)   24 98 °F (36.7 °C)       Pulse Readings from Last 3 Encounters:   24 90   24 94   24 92       BP Readings from Last 3 Encounters:   24 104/61   24 122/75   24 (!) 180/99       Weight:  Wt Readings from Last 3 Encounters:   24 54.5 kg (120 lb 2.4 oz)   24 52.6 kg (116 lb)   24 52.6 kg (116 lb)       Past Medical & Surgical History:  Past Medical History:   Diagnosis Date    ESRD on hemodialysis 2022    Gastritis 2022    EGD was 22    Gastroparesis 2022    has not had Emptying study    Heart failure with preserved ejection fraction 2022    EF 55% on 3/22    History of supraventricular tachycardia     Hyperkalemia 2022    Hypertensive emergency 2022    Sickle cell trait 2022    Type 2 diabetes mellitus        Past Surgical History:   Procedure Laterality Date     SECTION      x 3    COLONOSCOPY      COLONOSCOPY N/A 2022    Procedure: COLONOSCOPY;  Surgeon: Jagdeep Cedeno MD;  Location: [facility];  Service: Endoscopy;  Laterality: N/A;    DESTRUCTION, CILIARY BODY, USING LASER Left 2024    Procedure: Cyclophotocoagulation G-probe, retrobulbar chlorpromazine left eye;  Surgeon: Fidel Wise MD;  Location: [address];  Service: Ophthalmology;  Laterality: Left;    ENDOSCOPIC ULTRASOUND OF UPPER GASTROINTESTINAL TRACT N/A 2023    Procedure:  ULTRASOUND, UPPER GI TRACT, ENDOSCOPIC;  Surgeon: Micky Paredes III, MD;  Location: [facility] ENDO;  Service: Endoscopy;  Laterality: N/A;    ESOPHAGOGASTRODUODENOSCOPY N/A 10/18/2019    Procedure: ESOPHAGOGASTRODUODENOSCOPY (EGD);  Surgeon: Gianluca Mendez MD;  [facility];  Service: Endoscopy;  Laterality: N/A;    ESOPHAGOGASTRODUODENOSCOPY N/A 08/24/2022    Procedure: EGD (ESOPHAGOGASTRODUODENOSCOPY);  Surgeon: Micky Paredes III, MD;  Location: [facility];  Service: Endoscopy;  Laterality: N/A;    ESOPHAGOGASTRODUODENOSCOPY N/A 12/05/2022    Procedure: EGD (ESOPHAGOGASTRODUODENOSCOPY);  Surgeon: Marcelo Zhong MD;  Location: [facility];  Service: Endoscopy;  Laterality: N/A;    ESOPHAGOGASTRODUODENOSCOPY N/A 9/13/2024    Procedure: EGD (ESOPHAGOGASTRODUODENOSCOPY);  Surgeon: Marcelo Zhong MD;  Location: [facility];  Service: Endoscopy;  Laterality: N/A;    EYE SURGERY      INSERTION, CATHETER, TUNNELED N/A 06/17/2023    Procedure: Insertion,catheter,tunneled;  Surgeon: Carlos Thurman Jr., MD;  Location: [facility];  Service: General;  Laterality: N/A;    LAPAROSCOPIC CHOLECYSTECTOMY N/A 07/30/2022    Procedure: CHOLECYSTECTOMY, LAPAROSCOPIC;  Surgeon: Grey Perez MD;  Location: [facility] OR;  Service: General;  Laterality: N/A;    PLACEMENT OF DUAL-LUMEN VASCULAR CATHETER Left 07/12/2022    Procedure: INSERTION-CATHETER-JOSEPH;  Surgeon: Dionte Gan MD;  Location: [facility] OR;  Service: General;  Laterality: Left;    PLACEMENT OF DUAL-LUMEN VASCULAR CATHETER Right 07/26/2022    Procedure: INSERTION-CATHETER-Hemosplit;  Surgeon: Dionte Gan MD;  Location: [facility] OR;  Service: General;  Laterality: Right;       Past Social History:  Social History     Socioeconomic History    Marital status:    Tobacco Use    Smoking status: Never    Smokeless tobacco: Never   Substance and Sexual Activity    Alcohol use: Not Currently    Drug use: No    Sexual activity: Not Currently      Partners: Male     Birth control/protection: I.U.D.     Social Determinants of Health     Financial Resource Strain: Low Risk  (8/29/2024)    Overall Financial Resource Strain (CARDIA)     Difficulty of Paying Living Expenses: Not hard at all   Recent Concern: Financial Resource Strain - Medium Risk (8/25/2024)    Overall Financial Resource Strain (CARDIA)     Difficulty of Paying Living Expenses: Somewhat hard   Food Insecurity: No Food Insecurity (8/29/2024)    Hunger Vital Sign     Worried About Running Out of Food in the Last Year: Never true     Ran Out of Food in the Last Year: Never true   Recent Concern: Food Insecurity - Food Insecurity Present (8/25/2024)    Hunger Vital Sign     Worried About Running Out of Food in the Last Year: Often true     Ran Out of Food in the Last Year: Often true   Transportation Needs: No Transportation Needs (8/29/2024)    TRANSPORTATION NEEDS     Transportation : No   Physical Activity: Sufficiently Active (8/29/2024)    Exercise Vital Sign     Days of Exercise per Week: 5 days     Minutes of Exercise per Session: 30 min   Recent Concern: Physical Activity - Inactive (8/25/2024)    Exercise Vital Sign     Days of Exercise per Week: 0 days     Minutes of Exercise per Session: 0 min   Stress: No Stress Concern Present (8/29/2024)    [employer] - Occupational Stress Questionnaire     Feeling of Stress : Not at all   Recent Concern: Stress - Stress Concern Present (8/25/2024)    [employer] - Occupational Stress Questionnaire     Feeling of Stress : Rather much   Housing Stability: Low Risk  (8/29/2024)    Housing Stability Vital Sign     Unable to Pay for Housing in the Last Year: No     Homeless in the Last Year: No   Recent Concern: Housing Stability - High Risk (8/25/2024)    Housing Stability Vital Sign     Unable to Pay for Housing in the Last Year: Yes     Homeless in the Last Year: No       Medications:  No current  facility-administered medications on file prior to encounter.     Current Outpatient Medications on File Prior to Encounter   Medication Sig Dispense Refill    albuterol (VENTOLIN HFA) 90 mcg/actuation inhaler Inhale 2 puffs every 4 hours by inhalation route. 8 g 2    apixaban (ELIQUIS) 5 mg Tab Take 1 tablet (5 mg total) by mouth 2 (two) times daily. Patient w/ thrombocytopenia <50, so held during admission, follow-up with hematologist about restarting 180 tablet 1    brinzolamide (AZOPT) 1 % ophthalmic suspension Place1 drop in left eye 3 times a day for 30 days 15 mL 6    cetirizine (ZYRTEC) 10 MG tablet Take 1 tablet every day by oral route. 30 tablet 0    FLUoxetine 40 MG capsule Take 1 capsule every day by oral route. 30 capsule 2    fluticasone propionate (FLONASE ALLERGY RELIEF) 50 mcg/actuation nasal spray [place] 1 spray every day by intranasal route. 16 g 2    gabapentin (NEURONTIN) 300 MG capsule Take 2 capsules twice a day by oral route for 90 days. 360 capsule 1    insulin aspart U-100 (NOVOLOG) 100 unit/mL (3 mL) InPn pen Inject 8 Units into the skin 3 (three) times daily with meals. 15 mL 0    insulin glargine U-100, Lantus, (LANTUS SOLOSTAR U-100 INSULIN) 100 unit/mL (3 mL) InPn pen Inject 22 units every day by subcutaneous route in the evening for 30 days, for diabetes. 15 mL 2    levETIRAcetam (KEPPRA) 500 MG Tab Take 1 tablet (500 mg total) by mouth 2 (two) times daily. 60 tablet 11    ondansetron (ZOFRAN-ODT) 4 MG TbDL Place 1 tablet (4 mg) on or under the tongue every 8 hours for 10 days. 24 tablet 2    pantoprazole (PROTONIX) 40 MG tablet Take 1 tablet (40 mg total) by mouth once daily. 30 tablet 0    prednisoLONE acetate (PRED FORTE) 1 % DrpS Place 1 drop into the left eye 2 (two) times daily. 5 mL 3    promethazine (PHENERGAN) 25 MG tablet Take 1 tablet (25 mg total) by mouth every 6 (six) hours as needed. 15 tablet 0    sevelamer carbonate (RENVELA) 800 mg Tab Take 1 tablet (800 mg total) by  "mouth 3 (three) times daily with meals. 180 tablet 11    sucralfate (CARAFATE) 1 gram tablet Take 1 tablet (1 g total) by mouth 4 (four) times daily. 100 tablet 1    atropine 1% (ISOPTO ATROPINE) 1 % Drop Place 1 drop into the left eye 2 (two) times a day. 15 mL 3    blood sugar diagnostic (TRUE METRIX GLUCOSE TEST STRIP) Strp Use 1 strip to check blood glucose three times daily 100 each 11    blood-glucose meter (TRUE METRIX GLUCOSE METER) Misc Use to check blood glucose 1 each 0    blood-glucose meter,continuous Misc USE WITH SENSORS 1 each 0    blood-glucose sensor (DEXCOM G7 SENSOR) Heather Use as directed for CGM. Change sensor every 10 days 3 each 0    blood-glucose sensor Heather CHANGE EVERY 10 DAYS 3 each 0    brimonidine 0.15 % OPTH DROP (ALPHAGAN) 0.15 % ophthalmic solution Place 1 drop into both eyes 3 (three) times daily. 15 mL 3    busPIRone (BUSPAR) 10 MG tablet Take 1 tablet (10 mg total) by mouth 3 (three) times daily as needed (anxiety). 60 tablet 5    dorzolamide-timolol 2-0.5% (COSOPT) 22.3-6.8 mg/mL ophthalmic solution place 1 drop in left eye twice a day  for 30 days 10 mL 0    lancets (TRUEPLUS LANCETS) 33 gauge Misc Use 1 to check blood glucose three times daily 100 each 11    pen needle, diabetic 31 gauge x 3/16" Ndle Use as directed with insulin once daily 100 each 0    timolol maleate 0.5% (TIMOPTIC) 0.5 % Drop Place 1 drop in left eye 2 times a day for 30 days 5 mL 6    [DISCONTINUED] atenoloL (TENORMIN) 50 MG tablet Take 1 tablet (50 mg total) by mouth every other day. 45 tablet 3    [DISCONTINUED] insulin detemir U-100, Levemir, (LEVEMIR FLEXTOUCH U100 INSULIN) 100 unit/mL (3 mL) InPn pen Inject 22 units every day by subcutaneous route in the evening for 90 days. 18 mL 1    [DISCONTINUED] omeprazole (PRILOSEC) 20 MG capsule Take 2 capsules (40 mg total) by mouth once daily. for 10 days 20 capsule 0     Scheduled Meds:   apixaban  5 mg Oral BID    epoetin teresa-epbx  50 Units/kg Intravenous " "Every Tues, Thurs, Sat    FLUoxetine  40 mg Oral Daily    hydrALAZINE  25 mg Oral Q8H    insulin glargine U-100 (Lantus)  20 Units Subcutaneous QHS    levETIRAcetam  500 mg Oral BID    pantoprazole  40 mg Oral Daily    prednisoLONE acetate  1 drop Left Eye BID    sevelamer carbonate  800 mg Oral TID WM    sucralfate  1 g Oral QID     Continuous Infusions:  PRN Meds:.  Current Facility-Administered Medications:     acetaminophen, 650 mg, Oral, Q4H PRN    albuterol-ipratropium, 3 mL, Nebulization, Q6H PRN    aluminum-magnesium hydroxide-simethicone, 30 mL, Oral, QID PRN    dextrose 10%, 12.5 g, Intravenous, PRN    dextrose 10%, 25 g, Intravenous, PRN    HYDROcodone-acetaminophen, 1 tablet, Oral, Q4H PRN    HYDROmorphone, 1 mg, Intravenous, Q6H PRN    insulin aspart U-100, 0-10 Units, Subcutaneous, QID (AC + HS) PRN    lorazepam, 1 mg, Intravenous, Q6H PRN    naloxone, 0.02 mg, Intravenous, PRN    promethazine (PHENERGAN) 12.5 mg in 0.9% NaCl 50 mL IVPB, 12.5 mg, Intravenous, Q6H PRN    simethicone, 1 tablet, Oral, QID PRN    sodium chloride 0.9%, 10 mL, Intravenous, Q12H PRN    Allergies:  Droperidol, Haldol [haloperidol lactate], Penicillins, and Droperidol (bulk)    Past Family History:  Reviewed; refer to Hospitalist Admission Note    Review of Systems:  Review of Systems - All 14 systems reviewed and negative, except as noted in HPI    Physical Exam:    /61 (BP Location: Right arm, Patient Position: Lying)   Pulse 90   Temp 98 °F (36.7 °C) (Oral)   Resp 18   Ht 5' 2" (1.575 m)   Wt 54.5 kg (120 lb 2.4 oz)   LMP  (LMP Unknown) Comment: pt states last mentral cycle was 3yrs ago  SpO2 97%   Breastfeeding No   BMI 21.98 kg/m²     General Appearance:    Alert, cooperative, no distress, appears stated age   Head:    Normocephalic, without obvious abnormality, atraumatic   Eyes:    PER, conjunctiva/corneas clear, EOM's intact in both eyes        Throat:   Lips, mucosa, and tongue normal; teeth and gums " "normal   Back:     Symmetric, no curvature, ROM normal, no CVA tenderness   Lungs:     Clear to auscultation bilaterally, respirations unlabored   Chest wall:    No tenderness or deformity   Heart:    Regular rate and rhythm, S1 and S2 normal, no murmur, rub   or gallop   Abdomen:     Soft, non-tender, bowel sounds active all four quadrants,     no masses, no organomegaly   Extremities:   Extremities normal, atraumatic, no cyanosis or edema   Pulses:   2+ and symmetric all extremities   MSK:   No joint or muscle swelling, tenderness or deformity   Skin:   Skin color, texture, turgor normal, no rashes or lesions   Neurologic:   CNII-XII intact, normal strength and sensation       Throughout.  No flap     Results:  Lab Results   Component Value Date     09/26/2024    K 4.7 09/26/2024     09/26/2024    CO2 21 (L) 09/26/2024    BUN 43 (H) 09/26/2024    CREATININE 8.4 (H) 09/26/2024    CALCIUM 10.1 09/26/2024    ANIONGAP 15 09/26/2024    ESTGFRAFRICA 19 (L) 09/03/2022    EGFRNONAA 18 (A) 07/31/2022       Lab Results   Component Value Date    CALCIUM 10.1 09/26/2024    PHOS 2.5 (L) 09/22/2024       No results for input(s): "WBC", "RBC", "HGB", "HCT", "PLT", "MCV", "MCH", "MCHC" in the last 24 hours.    Mando Benitez MD    [facility] [place]  [phone]     "

## 2024-09-26 NOTE — CARE UPDATE
09/25/24 1924   Patient Assessment/Suction   Level of Consciousness (AVPU) alert   Respiratory Effort Normal;Unlabored   Expansion/Accessory Muscles/Retractions expansion symmetric;no retractions;no use of accessory muscles   All Lung Fields Breath Sounds clear   PRE-TX-O2   Device (Oxygen Therapy) room air   SpO2 98 %   Pulse Oximetry Type Intermittent   Pulse 92   Resp 20   Aerosol Therapy   $ Aerosol Therapy Charges PRN treatment not required   Respiratory Treatment Status (SVN) PRN treatment not required

## 2024-09-27 LAB
ALBUMIN SERPL BCP-MCNC: 3.5 G/DL (ref 3.5–5.2)
ALP SERPL-CCNC: 83 U/L (ref 55–135)
ALT SERPL W/O P-5'-P-CCNC: 11 U/L (ref 10–44)
ANION GAP SERPL CALC-SCNC: 15 MMOL/L (ref 8–16)
AST SERPL-CCNC: 16 U/L (ref 10–40)
BILIRUB SERPL-MCNC: 1 MG/DL (ref 0.1–1)
BUN SERPL-MCNC: 19 MG/DL (ref 6–20)
CALCIUM SERPL-MCNC: 10 MG/DL (ref 8.7–10.5)
CHLORIDE SERPL-SCNC: 101 MMOL/L (ref 95–110)
CO2 SERPL-SCNC: 24 MMOL/L (ref 23–29)
CREAT SERPL-MCNC: 5.4 MG/DL (ref 0.5–1.4)
EST. GFR  (NO RACE VARIABLE): 10 ML/MIN/1.73 M^2
GLUCOSE SERPL-MCNC: 173 MG/DL (ref 70–110)
POCT GLUCOSE: 220 MG/DL (ref 70–110)
POCT GLUCOSE: 231 MG/DL (ref 70–110)
POCT GLUCOSE: 236 MG/DL (ref 70–110)
POTASSIUM SERPL-SCNC: 4 MMOL/L (ref 3.5–5.1)
PROT SERPL-MCNC: 7.1 G/DL (ref 6–8.4)
SODIUM SERPL-SCNC: 140 MMOL/L (ref 136–145)

## 2024-09-27 PROCEDURE — 99900035 HC TECH TIME PER 15 MIN (STAT)

## 2024-09-27 PROCEDURE — 11000001 HC ACUTE MED/SURG PRIVATE ROOM

## 2024-09-27 PROCEDURE — 63600175 PHARM REV CODE 636 W HCPCS: Performed by: INTERNAL MEDICINE

## 2024-09-27 PROCEDURE — 94761 N-INVAS EAR/PLS OXIMETRY MLT: CPT

## 2024-09-27 PROCEDURE — 80053 COMPREHEN METABOLIC PANEL: CPT | Performed by: INTERNAL MEDICINE

## 2024-09-27 PROCEDURE — 25000003 PHARM REV CODE 250: Performed by: INTERNAL MEDICINE

## 2024-09-27 PROCEDURE — 25000003 PHARM REV CODE 250: Performed by: NURSE PRACTITIONER

## 2024-09-27 PROCEDURE — 25000003 PHARM REV CODE 250

## 2024-09-27 PROCEDURE — 36415 COLL VENOUS BLD VENIPUNCTURE: CPT | Performed by: INTERNAL MEDICINE

## 2024-09-27 PROCEDURE — 63600175 PHARM REV CODE 636 W HCPCS

## 2024-09-27 RX ORDER — HYDROMORPHONE HYDROCHLORIDE 1 MG/ML
1 INJECTION, SOLUTION INTRAMUSCULAR; INTRAVENOUS; SUBCUTANEOUS EVERY 4 HOURS PRN
Status: DISCONTINUED | OUTPATIENT
Start: 2024-09-27 | End: 2024-09-30 | Stop reason: HOSPADM

## 2024-09-27 RX ORDER — HYDROMORPHONE HYDROCHLORIDE 1 MG/ML
1 INJECTION, SOLUTION INTRAMUSCULAR; INTRAVENOUS; SUBCUTANEOUS ONCE
Status: COMPLETED | OUTPATIENT
Start: 2024-09-27 | End: 2024-09-27

## 2024-09-27 RX ADMIN — LORAZEPAM 1 MG: 2 INJECTION INTRAMUSCULAR; INTRAVENOUS at 12:09

## 2024-09-27 RX ADMIN — PREDNISOLONE ACETATE 1 DROP: 10 SUSPENSION/ DROPS OPHTHALMIC at 08:09

## 2024-09-27 RX ADMIN — HYDRALAZINE HYDROCHLORIDE 25 MG: 25 TABLET ORAL at 01:09

## 2024-09-27 RX ADMIN — HYDROMORPHONE HYDROCHLORIDE 1 MG: 1 INJECTION, SOLUTION INTRAMUSCULAR; INTRAVENOUS; SUBCUTANEOUS at 08:09

## 2024-09-27 RX ADMIN — HYDROMORPHONE HYDROCHLORIDE 1 MG: 1 INJECTION, SOLUTION INTRAMUSCULAR; INTRAVENOUS; SUBCUTANEOUS at 05:09

## 2024-09-27 RX ADMIN — PANTOPRAZOLE SODIUM 40 MG: 40 TABLET, DELAYED RELEASE ORAL at 09:09

## 2024-09-27 RX ADMIN — PREDNISOLONE ACETATE 1 DROP: 10 SUSPENSION/ DROPS OPHTHALMIC at 09:09

## 2024-09-27 RX ADMIN — SUCRALFATE 1 G: 1 TABLET ORAL at 09:09

## 2024-09-27 RX ADMIN — SEVELAMER CARBONATE 800 MG: 800 TABLET, FILM COATED ORAL at 09:09

## 2024-09-27 RX ADMIN — HYDROMORPHONE HYDROCHLORIDE 1 MG: 1 INJECTION, SOLUTION INTRAMUSCULAR; INTRAVENOUS; SUBCUTANEOUS at 03:09

## 2024-09-27 RX ADMIN — APIXABAN 5 MG: 2.5 TABLET, FILM COATED ORAL at 09:09

## 2024-09-27 RX ADMIN — HYDRALAZINE HYDROCHLORIDE 25 MG: 25 TABLET ORAL at 05:09

## 2024-09-27 RX ADMIN — PROMETHAZINE HYDROCHLORIDE 12.5 MG: 25 INJECTION INTRAMUSCULAR; INTRAVENOUS at 03:09

## 2024-09-27 RX ADMIN — SUCRALFATE 1 G: 1 TABLET ORAL at 08:09

## 2024-09-27 RX ADMIN — FLUOXETINE HYDROCHLORIDE 40 MG: 20 CAPSULE ORAL at 09:09

## 2024-09-27 RX ADMIN — APIXABAN 2.5 MG: 2.5 TABLET, FILM COATED ORAL at 08:09

## 2024-09-27 RX ADMIN — HYDRALAZINE HYDROCHLORIDE 25 MG: 25 TABLET ORAL at 09:09

## 2024-09-27 RX ADMIN — LEVETIRACETAM 500 MG: 250 TABLET, FILM COATED ORAL at 08:09

## 2024-09-27 RX ADMIN — LORAZEPAM 1 MG: 2 INJECTION INTRAMUSCULAR; INTRAVENOUS at 09:09

## 2024-09-27 RX ADMIN — HYDROMORPHONE HYDROCHLORIDE 1 MG: 1 INJECTION, SOLUTION INTRAMUSCULAR; INTRAVENOUS; SUBCUTANEOUS at 10:09

## 2024-09-27 RX ADMIN — PROMETHAZINE HYDROCHLORIDE 12.5 MG: 25 INJECTION INTRAMUSCULAR; INTRAVENOUS at 07:09

## 2024-09-27 RX ADMIN — LEVETIRACETAM 500 MG: 250 TABLET, FILM COATED ORAL at 09:09

## 2024-09-27 NOTE — SUBJECTIVE & OBJECTIVE
Interval History: Still complaining of left flank pain.     Review of Systems  Objective:     Vital Signs (Most Recent):  Temp: 97.8 °F (36.6 °C) (09/27/24 0740)  Pulse: (!) 115 (09/27/24 0801)  Resp: (!) 21 (09/27/24 1025)  BP: (!) 187/112 (09/27/24 0740)  SpO2: 100 % (09/27/24 0801) Vital Signs (24h Range):  Temp:  [97.6 °F (36.4 °C)-98.6 °F (37 °C)] 97.8 °F (36.6 °C)  Pulse:  [] 115  Resp:  [16-21] 21  SpO2:  [95 %-100 %] 100 %  BP: ()/() 187/112     Weight: 54.5 kg (120 lb 2.4 oz)  Body mass index is 21.98 kg/m².    Intake/Output Summary (Last 24 hours) at 9/27/2024 1108  Last data filed at 9/27/2024 0511  Gross per 24 hour   Intake 600 ml   Output 1742 ml   Net -1142 ml         Physical Exam  Vitals reviewed.   Constitutional:       Appearance: She is ill-appearing.      Comments: Chronically ill appearing   HENT:      Head: Normocephalic and atraumatic.      Nose: Nose normal.      Mouth/Throat:      Mouth: Mucous membranes are dry.      Pharynx: Oropharynx is clear.   Eyes:      Conjunctiva/sclera:      Left eye: Hemorrhage present.   Cardiovascular:      Rate and Rhythm: Regular rhythm.      Pulses:           Radial pulses are 2+ on the right side and 2+ on the left side.        Dorsalis pedis pulses are 2+ on the right side and 2+ on the left side.      Arteriovenous access: Left arteriovenous access is present.  Pulmonary:      Effort: Pulmonary effort is normal.      Breath sounds: Normal breath sounds.   Abdominal:      General: Bowel sounds are normal.      Palpations: Abdomen is soft.      Tenderness: There is generalized abdominal tenderness.   Musculoskeletal:         General: Normal range of motion.      Cervical back: Normal range of motion and neck supple.      Right lower leg: No edema.      Left lower leg: No edema.   Skin:     General: Skin is warm and dry.      Capillary Refill: Capillary refill takes less than 2 seconds.   Neurological:      General: No focal deficit  "present.      Mental Status: She is alert and oriented to person, place, and time. Mental status is at baseline.   Psychiatric:         Attention and Perception: Attention normal.         Behavior: Behavior is withdrawn       Significant Labs: All pertinent labs within the past 24 hours have been reviewed.  CBC:   No results for input(s): "WBC", "HGB", "HCT", "PLT" in the last 48 hours.    CMP:   Recent Labs   Lab 09/26/24  0420 09/27/24  0420    140   K 4.7 4.0    101   CO2 21* 24   * 173*   BUN 43* 19   CREATININE 8.4* 5.4*   CALCIUM 10.1 10.0   PROT 6.8 7.1   ALBUMIN 3.2* 3.5   BILITOT 0.7 1.0   ALKPHOS 80 83   AST 15 16   ALT 9* 11   ANIONGAP 15 15       Significant Imaging: I have reviewed all pertinent imaging results/findings within the past 24 hours.  Physical Exam  "

## 2024-09-27 NOTE — PROGRESS NOTES
INPATIENT NEPHROLOGY progress  [facility] NEPHROLOGY    Tabby Howard  09/27/2024    Reason for consultation:  esrd    Chief Complaint:   Chief Complaint   Patient presents with    Flank Pain     Left side flank pain, N&V starting this morning     History of Present Illness:    Per H and P  Tabby Howard is a 34 year old female with a previous medical history of ESRD on dialysis Tue/Thur/Sat, chronic abdominal pain, HTN, Type II Diabetes, who presented to the ED  for left-sided flank pain. HPI is limited to chart review and ED note due to patient not being cooperative.  Per chart review, patient has multiple similar episodes in the past and has had multiple previous CT scans performed.  Also has had multiple hospitalizations for DKA in the past. Patient reports last dialysis was Monday 9/9/24 due to them scheduling her that day. She was unable to attend her normal Thursday session due to severe abdominal pain and vomiting. Of note she has had 5 ER visits over the past five days and most recent admission was on 9/10/24. She was admitted for DKA and insulin drip discontinued the next day. She received dialysis along with an EGD that showed mild gastritis. Initial ED evaluation showed elevated anion gap and betahydroxybutyrate but low normal PH.      9/21  pressures low, but stable.  Lab results reviewed.  Seen today on dialysis.  Has hypotension, but alert and oriented.    9/22  pressures actually high now--will resume home hydralazine at low dose.  Lab results reviewed.  Still w/same pain, no other complaints.  0/23 VSS. Plan HD tomorrow or PRN.  9/24 VSS. HD  today. OK to dc home if stable after HD.  9/25 VSS. HD tomorrow. Ongoing symptoms with n/v, pain...  9/26 VSS. HD tomorrow. Still in a lot of pain.    Plan of Care:    ESRD on HD TTS  Hyponatremia  Acidosis  --continue dialysis per routine  --renal dose medication per routine  --pt with 17 hospital admissions in last 12 months    Anemia of  CKD  --erythropoiesis stimulating agent with renal replacement therapy    Secondary HPT  --renal diet as tolerated  --continue binders with meals if tolerated    Hypertension  --uf with hd as tolerated  --low salt diet as tolerated  --continue home medication    DM  Gastroparesis   --management per primary team    Thank you for allowing us to participate in this patient's care. We will continue to follow.    Vital Signs:  Temp Readings from Last 3 Encounters:   24 97.9 °F (36.6 °C) (Oral)   24 98.4 °F (36.9 °C) (Oral)   24 98 °F (36.7 °C)       Pulse Readings from Last 3 Encounters:   24 108   24 94   24 92       BP Readings from Last 3 Encounters:   24 (!) 237/133   24 122/75   24 (!) 180/99       Weight:  Wt Readings from Last 3 Encounters:   24 54.5 kg (120 lb 2.4 oz)   24 52.6 kg (116 lb)   24 52.6 kg (116 lb)       Past Medical & Surgical History:  Past Medical History:   Diagnosis Date    ESRD on hemodialysis 2022    Gastritis 2022    EGD was 22    Gastroparesis 2022    has not had Emptying study    Heart failure with preserved ejection fraction 2022    EF 55% on 3/22    History of supraventricular tachycardia     Hyperkalemia 2022    Hypertensive emergency 2022    Sickle cell trait 2022    Type 2 diabetes mellitus        Past Surgical History:   Procedure Laterality Date     SECTION      x 3    COLONOSCOPY      COLONOSCOPY N/A 2022    Procedure: COLONOSCOPY;  Surgeon: Jagdeep Cedeno MD;  Location: [facility];  Service: Endoscopy;  Laterality: N/A;    DESTRUCTION, CILIARY BODY, USING LASER Left 2024    Procedure: Cyclophotocoagulation G-probe, retrobulbar chlorpromazine left eye;  Surgeon: Fidel Wise MD;  Location: [address];  Service: Ophthalmology;  Laterality: Left;    ENDOSCOPIC ULTRASOUND OF UPPER GASTROINTESTINAL TRACT N/A 2023    Procedure: ULTRASOUND,  UPPER GI TRACT, ENDOSCOPIC;  Surgeon: Micky Paredes III, MD;  Location: [facility] ENDO;  Service: Endoscopy;  Laterality: N/A;    ESOPHAGOGASTRODUODENOSCOPY N/A 10/18/2019    Procedure: ESOPHAGOGASTRODUODENOSCOPY (EGD);  Surgeon: Gianluca Mendez MD;  Location: [facility];  Service: Endoscopy;  Laterality: N/A;    ESOPHAGOGASTRODUODENOSCOPY N/A 08/24/2022    Procedure: EGD (ESOPHAGOGASTRODUODENOSCOPY);  Surgeon: Micky Paredes III, MD;  Location: [facility];  Service: Endoscopy;  Laterality: N/A;    ESOPHAGOGASTRODUODENOSCOPY N/A 12/05/2022    Procedure: EGD (ESOPHAGOGASTRODUODENOSCOPY);  Surgeon: Marcelo Zhong MD;  Location: [facility];  Service: Endoscopy;  Laterality: N/A;    ESOPHAGOGASTRODUODENOSCOPY N/A 9/13/2024    Procedure: EGD (ESOPHAGOGASTRODUODENOSCOPY);  Surgeon: Marcelo Zhong MD;  Location: [facility];  Service: Endoscopy;  Laterality: N/A;    EYE SURGERY      INSERTION, CATHETER, TUNNELED N/A 06/17/2023    Procedure: Insertion,catheter,tunneled;  Surgeon: Carlos Thurman Jr., MD;  Location: [facility];  Service: General;  Laterality: N/A;    LAPAROSCOPIC CHOLECYSTECTOMY N/A 07/30/2022    Procedure: CHOLECYSTECTOMY, LAPAROSCOPIC;  Surgeon: Grey Perez MD;  Location: [facility] OR;  Service: General;  Laterality: N/A;    PLACEMENT OF DUAL-LUMEN VASCULAR CATHETER Left 07/12/2022    Procedure: INSERTION-CATHETER-JOSEPH;  Surgeon: Dionte Gan MD;  Location: [facility] OR;  Service: General;  Laterality: Left;    PLACEMENT OF DUAL-LUMEN VASCULAR CATHETER Right 07/26/2022    Procedure: INSERTION-CATHETER-Hemosplit;  Surgeon: Dionte Gan MD;  Location: [facility] OR;  Service: General;  Laterality: Right;       Past Social History:  Social History     Socioeconomic History    Marital status:    Tobacco Use    Smoking status: Never    Smokeless tobacco: Never   Substance and Sexual Activity    Alcohol use: Not Currently    Drug use: No    Sexual activity: Not Currently     Partners: Male      Birth control/protection: I.U.D.     Social Determinants of Health     Financial Resource Strain: Low Risk  (8/29/2024)    Overall Financial Resource Strain (CARDIA)     Difficulty of Paying Living Expenses: Not hard at all   Recent Concern: Financial Resource Strain - Medium Risk (8/25/2024)    Overall Financial Resource Strain (CARDIA)     Difficulty of Paying Living Expenses: Somewhat hard   Food Insecurity: No Food Insecurity (8/29/2024)    Hunger Vital Sign     Worried About Running Out of Food in the Last Year: Never true     Ran Out of Food in the Last Year: Never true   Recent Concern: Food Insecurity - Food Insecurity Present (8/25/2024)    Hunger Vital Sign     Worried About Running Out of Food in the Last Year: Often true     Ran Out of Food in the Last Year: Often true   Transportation Needs: No Transportation Needs (8/29/2024)    TRANSPORTATION NEEDS     Transportation : No   Physical Activity: Sufficiently Active (8/29/2024)    Exercise Vital Sign     Days of Exercise per Week: 5 days     Minutes of Exercise per Session: 30 min   Recent Concern: Physical Activity - Inactive (8/25/2024)    Exercise Vital Sign     Days of Exercise per Week: 0 days     Minutes of Exercise per Session: 0 min   Stress: No Stress Concern Present (8/29/2024)    [employer] - Occupational Stress Questionnaire     Feeling of Stress : Not at all   Recent Concern: Stress - Stress Concern Present (8/25/2024)    [employer] - Occupational Stress Questionnaire     Feeling of Stress : Rather much   Housing Stability: Low Risk  (8/29/2024)    Housing Stability Vital Sign     Unable to Pay for Housing in the Last Year: No     Homeless in the Last Year: No   Recent Concern: Housing Stability - High Risk (8/25/2024)    Housing Stability Vital Sign     Unable to Pay for Housing in the Last Year: Yes     Homeless in the Last Year: No       Medications:  No current  facility-administered medications on file prior to encounter.     Current Outpatient Medications on File Prior to Encounter   Medication Sig Dispense Refill    albuterol (VENTOLIN HFA) 90 mcg/actuation inhaler Inhale 2 puffs every 4 hours by inhalation route. 8 g 2    apixaban (ELIQUIS) 5 mg Tab Take 1 tablet (5 mg total) by mouth 2 (two) times daily. Patient w/ thrombocytopenia <50, so held during admission, follow-up with hematologist about restarting 180 tablet 1    brinzolamide (AZOPT) 1 % ophthalmic suspension Place1 drop in left eye 3 times a day for 30 days 15 mL 6    cetirizine (ZYRTEC) 10 MG tablet Take 1 tablet every day by oral route. 30 tablet 0    FLUoxetine 40 MG capsule Take 1 capsule every day by oral route. 30 capsule 2    fluticasone propionate (FLONASE ALLERGY RELIEF) 50 mcg/actuation nasal spray Dequincy 1 spray every day by intranasal route. 16 g 2    gabapentin (NEURONTIN) 300 MG capsule Take 2 capsules twice a day by oral route for 90 days. 360 capsule 1    insulin aspart U-100 (NOVOLOG) 100 unit/mL (3 mL) InPn pen Inject 8 Units into the skin 3 (three) times daily with meals. 15 mL 0    insulin glargine U-100, Lantus, (LANTUS SOLOSTAR U-100 INSULIN) 100 unit/mL (3 mL) InPn pen Inject 22 units every day by subcutaneous route in the evening for 30 days, for diabetes. 15 mL 2    levETIRAcetam (KEPPRA) 500 MG Tab Take 1 tablet (500 mg total) by mouth 2 (two) times daily. 60 tablet 11    ondansetron (ZOFRAN-ODT) 4 MG TbDL Place 1 tablet (4 mg) on or under the tongue every 8 hours for 10 days. 24 tablet 2    pantoprazole (PROTONIX) 40 MG tablet Take 1 tablet (40 mg total) by mouth once daily. 30 tablet 0    prednisoLONE acetate (PRED FORTE) 1 % DrpS Place 1 drop into the left eye 2 (two) times daily. 5 mL 3    promethazine (PHENERGAN) 25 MG tablet Take 1 tablet (25 mg total) by mouth every 6 (six) hours as needed. 15 tablet 0    sevelamer carbonate (RENVELA) 800 mg Tab Take 1 tablet (800 mg total) by  "mouth 3 (three) times daily with meals. 180 tablet 11    sucralfate (CARAFATE) 1 gram tablet Take 1 tablet (1 g total) by mouth 4 (four) times daily. 100 tablet 1    atropine 1% (ISOPTO ATROPINE) 1 % Drop Place 1 drop into the left eye 2 (two) times a day. 15 mL 3    blood sugar diagnostic (TRUE METRIX GLUCOSE TEST STRIP) Strp Use 1 strip to check blood glucose three times daily 100 each 11    blood-glucose meter (TRUE METRIX GLUCOSE METER) Misc Use to check blood glucose 1 each 0    blood-glucose meter,continuous Misc USE WITH SENSORS 1 each 0    blood-glucose sensor (DEXCOM G7 SENSOR) Heather Use as directed for CGM. Change sensor every 10 days 3 each 0    blood-glucose sensor Heather CHANGE EVERY 10 DAYS 3 each 0    brimonidine 0.15 % OPTH DROP (ALPHAGAN) 0.15 % ophthalmic solution Place 1 drop into both eyes 3 (three) times daily. 15 mL 3    busPIRone (BUSPAR) 10 MG tablet Take 1 tablet (10 mg total) by mouth 3 (three) times daily as needed (anxiety). 60 tablet 5    dorzolamide-timolol 2-0.5% (COSOPT) 22.3-6.8 mg/mL ophthalmic solution place 1 drop in left eye twice a day  for 30 days 10 mL 0    lancets (TRUEPLUS LANCETS) 33 gauge Misc Use 1 to check blood glucose three times daily 100 each 11    pen needle, diabetic 31 gauge x 3/16" Ndle Use as directed with insulin once daily 100 each 0    timolol maleate 0.5% (TIMOPTIC) 0.5 % Drop Place 1 drop in left eye 2 times a day for 30 days 5 mL 6    [DISCONTINUED] atenoloL (TENORMIN) 50 MG tablet Take 1 tablet (50 mg total) by mouth every other day. 45 tablet 3    [DISCONTINUED] insulin detemir U-100, Levemir, (LEVEMIR FLEXTOUCH U100 INSULIN) 100 unit/mL (3 mL) InPn pen Inject 22 units every day by subcutaneous route in the evening for 90 days. 18 mL 1    [DISCONTINUED] omeprazole (PRILOSEC) 20 MG capsule Take 2 capsules (40 mg total) by mouth once daily. for 10 days 20 capsule 0     Scheduled Meds:   apixaban  2.5 mg Oral BID    epoetin teresa-epbx  50 Units/kg Intravenous " "Every Tues, Thurs, Sat    FLUoxetine  40 mg Oral Daily    hydrALAZINE  25 mg Oral Q8H    insulin glargine U-100 (Lantus)  20 Units Subcutaneous QHS    levETIRAcetam  500 mg Oral BID    pantoprazole  40 mg Oral Daily    prednisoLONE acetate  1 drop Left Eye BID    sevelamer carbonate  800 mg Oral TID WM    sucralfate  1 g Oral QID     Continuous Infusions:  PRN Meds:.  Current Facility-Administered Medications:     acetaminophen, 650 mg, Oral, Q4H PRN    albuterol-ipratropium, 3 mL, Nebulization, Q6H PRN    aluminum-magnesium hydroxide-simethicone, 30 mL, Oral, QID PRN    dextrose 10%, 12.5 g, Intravenous, PRN    dextrose 10%, 25 g, Intravenous, PRN    HYDROcodone-acetaminophen, 1 tablet, Oral, Q4H PRN    HYDROmorphone, 1 mg, Intravenous, Q4H PRN    insulin aspart U-100, 0-10 Units, Subcutaneous, QID (AC + HS) PRN    lorazepam, 1 mg, Intravenous, Q6H PRN    naloxone, 0.02 mg, Intravenous, PRN    promethazine (PHENERGAN) 12.5 mg in 0.9% NaCl 50 mL IVPB, 12.5 mg, Intravenous, Q6H PRN    simethicone, 1 tablet, Oral, QID PRN    sodium chloride 0.9%, 10 mL, Intravenous, Q12H PRN    Allergies:  Droperidol, Haldol [haloperidol lactate], Penicillins, and Droperidol (bulk)    Past Family History:  Reviewed; refer to Hospitalist Admission Note    Review of Systems:  Review of Systems - All 14 systems reviewed and negative, except as noted in HPI    Physical Exam:    BP (!) 237/133 (BP Location: Right arm, Patient Position: Lying)   Pulse 108   Temp 97.9 °F (36.6 °C) (Oral)   Resp 19   Ht 5' 2" (1.575 m)   Wt 54.5 kg (120 lb 2.4 oz)   LMP  (LMP Unknown) Comment: pt states last mentral cycle was 3yrs ago  SpO2 100%   Breastfeeding No   BMI 21.98 kg/m²     General Appearance:    Alert, cooperative, no distress, appears stated age   Head:    Normocephalic, without obvious abnormality, atraumatic   Eyes:    PER, conjunctiva/corneas clear, EOM's intact in both eyes        Throat:   Lips, mucosa, and tongue normal; teeth and " "gums normal   Back:     Symmetric, no curvature, ROM normal, no CVA tenderness   Lungs:     Clear to auscultation bilaterally, respirations unlabored   Chest wall:    No tenderness or deformity   Heart:    Regular rate and rhythm, S1 and S2 normal, no murmur, rub   or gallop   Abdomen:     Soft, non-tender, bowel sounds active all four quadrants,     no masses, no organomegaly   Extremities:   Extremities normal, atraumatic, no cyanosis or edema   Pulses:   2+ and symmetric all extremities   MSK:   No joint or muscle swelling, tenderness or deformity   Skin:   Skin color, texture, turgor normal, no rashes or lesions   Neurologic:   CNII-XII intact, normal strength and sensation       Throughout.  No flap     Results:  Lab Results   Component Value Date     09/27/2024    K 4.0 09/27/2024     09/27/2024    CO2 24 09/27/2024    BUN 19 09/27/2024    CREATININE 5.4 (H) 09/27/2024    CALCIUM 10.0 09/27/2024    ANIONGAP 15 09/27/2024    ESTGFRAFRICA 19 (L) 09/03/2022    EGFRNONAA 18 (A) 07/31/2022       Lab Results   Component Value Date    CALCIUM 10.0 09/27/2024    PHOS 2.5 (L) 09/22/2024       No results for input(s): "WBC", "RBC", "HGB", "HCT", "PLT", "MCV", "MCH", "MCHC" in the last 24 hours.    Mando Benitez MD    [facility]  [phone]     "

## 2024-09-27 NOTE — PLAN OF CARE
Problem: Hemodialysis  Goal: Safe, Effective Therapy Delivery  Outcome: Progressing  Goal: Effective Tissue Perfusion  Outcome: Progressing  Goal: Absence of Infection Signs and Symptoms  Outcome: Progressing     Problem: Adult Inpatient Plan of Care  Goal: Plan of Care Review  Outcome: Progressing  Goal: Absence of Hospital-Acquired Illness or Injury  Outcome: Progressing  Goal: Optimal Comfort and Wellbeing  Outcome: Progressing     Problem: Diabetes Comorbidity  Goal: Blood Glucose Level Within Targeted Range  Outcome: Progressing     Problem: Wound  Goal: Optimal Coping  Outcome: Progressing  Goal: Optimal Pain Control and Function  Outcome: Progressing     Problem: Pain Acute  Goal: Optimal Pain Control and Function  Outcome: Progressing

## 2024-09-27 NOTE — PLAN OF CARE
Problem: Violence Risk or Actual  Goal: Anger and Impulse Control  Outcome: Progressing     Problem: Hemodialysis  Goal: Safe, Effective Therapy Delivery  Outcome: Progressing  Goal: Effective Tissue Perfusion  Outcome: Progressing  Goal: Absence of Infection Signs and Symptoms  Outcome: Progressing     Problem: Adult Inpatient Plan of Care  Goal: Plan of Care Review  Outcome: Progressing  Goal: Patient-Specific Goal (Individualized)  Outcome: Progressing  Goal: Absence of Hospital-Acquired Illness or Injury  Outcome: Progressing  Goal: Optimal Comfort and Wellbeing  Outcome: Progressing  Goal: Readiness for Transition of Care  Outcome: Progressing     Problem: Diabetes Comorbidity  Goal: Blood Glucose Level Within Targeted Range  Outcome: Progressing     Problem: Wound  Goal: Optimal Coping  Outcome: Progressing  Goal: Optimal Functional Ability  Outcome: Progressing  Goal: Absence of Infection Signs and Symptoms  Outcome: Progressing  Goal: Improved Oral Intake  Outcome: Progressing  Goal: Optimal Pain Control and Function  Outcome: Progressing  Goal: Skin Health and Integrity  Outcome: Progressing  Goal: Optimal Wound Healing  Outcome: Progressing     Problem: Fall Injury Risk  Goal: Absence of Fall and Fall-Related Injury  Outcome: Progressing     Problem: Pain Acute  Goal: Optimal Pain Control and Function  Outcome: Progressing

## 2024-09-27 NOTE — PROGRESS NOTES
[facility] Medicine  Progress Note    Patient Name: Tabby Howard  MRN: [id number]  Patient Class: IP- Inpatient   Admission Date: 9/19/2024  Length of Stay: 4 days  Attending Physician: Alice Fish MD  Primary Care Provider: Melony Kim NP        Subjective:     Principal Problem:High anion gap metabolic acidosis        HPI:  Tabby Howard is a 34 year old female with a previous medical history of ESRD on dialysis Tue/Thur/Sat, chronic abdominal pain, HTN, Type II Diabetes, who presented to the ED  for left-sided flank pain. HPI is limited to chart review and ED note due to patient not being cooperative.  Per chart review, patient has multiple similar episodes in the past and has had multiple previous CT scans performed.  Also has had multiple hospitalizations for DKA in the past. Patient reports last dialysis was Monday 9/9/24 due to them scheduling her that day. She was unable to attend her normal Thursday session due to severe abdominal pain and vomiting. Of note she has had 5 ER visits over the past five days and most recent admission was on 9/10/24. She was admitted for DKA and insulin drip discontinued the next day. She received dialysis along with an EGD that was negative for any acute process. Initial ED evaluation showed elevated anion gap and betahydroxybutyrate but low normal PH. Dr. Bradford consulted and he placed orders for dialysis. Pain and nausea controlled with ativan and phenergan. Patient admitted by hospital medicine for further evaluation and management.     Overview/Hospital Course:  Patient is a 34 year old patient with diabetes and ESRD on HD, frequent hospital admissions for multiple complains, chronic abdominal pain. Patient was recently discharged few days ago. Patient came back with worsening pain on the left flank, nausea and vomiting. Patient is euglycemic. However beta hydroxybutyrate was elevated. Patient was hydrated. Acidosis improved.  The patient continued to c/o abdominal pain. Prn pain meds were given. The patient developed n/v once again, and prn antiemetics were given.    Interval History: Still complaining of left flank pain.     Review of Systems  Objective:     Vital Signs (Most Recent):  Temp: 97.8 °F (36.6 °C) (09/27/24 0740)  Pulse: (!) 115 (09/27/24 0801)  Resp: (!) 21 (09/27/24 1025)  BP: (!) 187/112 (09/27/24 0740)  SpO2: 100 % (09/27/24 0801) Vital Signs (24h Range):  Temp:  [97.6 °F (36.4 °C)-98.6 °F (37 °C)] 97.8 °F (36.6 °C)  Pulse:  [] 115  Resp:  [16-21] 21  SpO2:  [95 %-100 %] 100 %  BP: ()/() 187/112     Weight: 54.5 kg (120 lb 2.4 oz)  Body mass index is 21.98 kg/m².    Intake/Output Summary (Last 24 hours) at 9/27/2024 1108  Last data filed at 9/27/2024 0511  Gross per 24 hour   Intake 600 ml   Output 1742 ml   Net -1142 ml         Physical Exam  Vitals reviewed.   Constitutional:       Appearance: She is ill-appearing.      Comments: Chronically ill appearing   HENT:      Head: Normocephalic and atraumatic.      Nose: Nose normal.      Mouth/Throat:      Mouth: Mucous membranes are dry.      Pharynx: Oropharynx is clear.   Eyes:      Conjunctiva/sclera:      Left eye: Hemorrhage present.   Cardiovascular:      Rate and Rhythm: Regular rhythm.      Pulses:           Radial pulses are 2+ on the right side and 2+ on the left side.        Dorsalis pedis pulses are 2+ on the right side and 2+ on the left side.      Arteriovenous access: Left arteriovenous access is present.  Pulmonary:      Effort: Pulmonary effort is normal.      Breath sounds: Normal breath sounds.   Abdominal:      General: Bowel sounds are normal.      Palpations: Abdomen is soft.      Tenderness: There is generalized abdominal tenderness.   Musculoskeletal:         General: Normal range of motion.      Cervical back: Normal range of motion and neck supple.      Right lower leg: No edema.      Left lower leg: No edema.   Skin:     General:  "Skin is warm and dry.      Capillary Refill: Capillary refill takes less than 2 seconds.   Neurological:      General: No focal deficit present.      Mental Status: She is alert and oriented to person, place, and time. Mental status is at baseline.   Psychiatric:         Attention and Perception: Attention normal.         Behavior: Behavior is withdrawn       Significant Labs: All pertinent labs within the past 24 hours have been reviewed.  CBC:   No results for input(s): "WBC", "HGB", "HCT", "PLT" in the last 48 hours.    CMP:   Recent Labs   Lab 09/26/24  0420 09/27/24  0420    140   K 4.7 4.0    101   CO2 21* 24   * 173*   BUN 43* 19   CREATININE 8.4* 5.4*   CALCIUM 10.1 10.0   PROT 6.8 7.1   ALBUMIN 3.2* 3.5   BILITOT 0.7 1.0   ALKPHOS 80 83   AST 15 16   ALT 9* 11   ANIONGAP 15 15       Significant Imaging: I have reviewed all pertinent imaging results/findings within the past 24 hours.  Physical Exam    Assessment/Plan:      * High anion gap metabolic acidosis  Resolved          Chronic abdominal pain  Prn pain meds and nausea meds      ESRD (end stage renal disease) on dialysis  Dialysis as per nephrology      Diabetes  SSI    Seizures  Home meds      Hypertension  Home meds      VTE Risk Mitigation (From admission, onward)           Ordered     apixaban tablet 2.5 mg  2 times daily         09/27/24 1055     IP VTE HIGH RISK PATIENT  Once         09/19/24 1624     Place sequential compression device  Until discontinued         09/19/24 1624                    Discharge Planning   TATIANA: 9/30/2024     Code Status: Full Code   Is the patient medically ready for discharge?:     Reason for patient still in hospital (select all that apply): Patient trending condition  Discharge Plan A: Home with family                  Alice Fish MD, MD  Department of Hospital Medicine   [facility] - Med/Surg    "

## 2024-09-28 LAB
ALBUMIN SERPL BCP-MCNC: 3.5 G/DL (ref 3.5–5.2)
ALP SERPL-CCNC: 88 U/L (ref 55–135)
ALT SERPL W/O P-5'-P-CCNC: 10 U/L (ref 10–44)
ANION GAP SERPL CALC-SCNC: 15 MMOL/L (ref 8–16)
AST SERPL-CCNC: 16 U/L (ref 10–40)
BILIRUB SERPL-MCNC: 1.1 MG/DL (ref 0.1–1)
BUN SERPL-MCNC: 33 MG/DL (ref 6–20)
CALCIUM SERPL-MCNC: 10.4 MG/DL (ref 8.7–10.5)
CHLORIDE SERPL-SCNC: 101 MMOL/L (ref 95–110)
CO2 SERPL-SCNC: 24 MMOL/L (ref 23–29)
CREAT SERPL-MCNC: 7.9 MG/DL (ref 0.5–1.4)
EST. GFR  (NO RACE VARIABLE): 6 ML/MIN/1.73 M^2
GLUCOSE SERPL-MCNC: 242 MG/DL (ref 70–110)
POCT GLUCOSE: 136 MG/DL (ref 70–110)
POCT GLUCOSE: 152 MG/DL (ref 70–110)
POCT GLUCOSE: 239 MG/DL (ref 70–110)
POTASSIUM SERPL-SCNC: 4.1 MMOL/L (ref 3.5–5.1)
PROT SERPL-MCNC: 7.1 G/DL (ref 6–8.4)
SODIUM SERPL-SCNC: 140 MMOL/L (ref 136–145)

## 2024-09-28 PROCEDURE — 94761 N-INVAS EAR/PLS OXIMETRY MLT: CPT

## 2024-09-28 PROCEDURE — 63600175 PHARM REV CODE 636 W HCPCS

## 2024-09-28 PROCEDURE — 80053 COMPREHEN METABOLIC PANEL: CPT | Performed by: INTERNAL MEDICINE

## 2024-09-28 PROCEDURE — 90935 HEMODIALYSIS ONE EVALUATION: CPT

## 2024-09-28 PROCEDURE — 93005 ELECTROCARDIOGRAM TRACING: CPT

## 2024-09-28 PROCEDURE — 99900035 HC TECH TIME PER 15 MIN (STAT)

## 2024-09-28 PROCEDURE — 25000003 PHARM REV CODE 250: Performed by: NURSE PRACTITIONER

## 2024-09-28 PROCEDURE — 36415 COLL VENOUS BLD VENIPUNCTURE: CPT | Performed by: INTERNAL MEDICINE

## 2024-09-28 PROCEDURE — 63600175 PHARM REV CODE 636 W HCPCS: Mod: JZ,JG | Performed by: INTERNAL MEDICINE

## 2024-09-28 PROCEDURE — 25000003 PHARM REV CODE 250: Performed by: INTERNAL MEDICINE

## 2024-09-28 PROCEDURE — 63600175 PHARM REV CODE 636 W HCPCS: Performed by: INTERNAL MEDICINE

## 2024-09-28 PROCEDURE — 93010 ELECTROCARDIOGRAM REPORT: CPT | Mod: ,,, | Performed by: GENERAL PRACTICE

## 2024-09-28 PROCEDURE — 11000001 HC ACUTE MED/SURG PRIVATE ROOM

## 2024-09-28 PROCEDURE — 25000003 PHARM REV CODE 250

## 2024-09-28 RX ORDER — HYDRALAZINE HYDROCHLORIDE 25 MG/1
50 TABLET, FILM COATED ORAL EVERY 8 HOURS
Status: DISCONTINUED | OUTPATIENT
Start: 2024-09-28 | End: 2024-09-30 | Stop reason: HOSPADM

## 2024-09-28 RX ADMIN — INSULIN ASPART 4 UNITS: 100 INJECTION, SOLUTION INTRAVENOUS; SUBCUTANEOUS at 07:09

## 2024-09-28 RX ADMIN — APIXABAN 2.5 MG: 2.5 TABLET, FILM COATED ORAL at 08:09

## 2024-09-28 RX ADMIN — HYDRALAZINE HYDROCHLORIDE 25 MG: 25 TABLET ORAL at 05:09

## 2024-09-28 RX ADMIN — HYDRALAZINE HYDROCHLORIDE 50 MG: 25 TABLET ORAL at 10:09

## 2024-09-28 RX ADMIN — HYDROMORPHONE HYDROCHLORIDE 1 MG: 1 INJECTION, SOLUTION INTRAMUSCULAR; INTRAVENOUS; SUBCUTANEOUS at 01:09

## 2024-09-28 RX ADMIN — SUCRALFATE 1 G: 1 TABLET ORAL at 10:09

## 2024-09-28 RX ADMIN — SUCRALFATE 1 G: 1 TABLET ORAL at 05:09

## 2024-09-28 RX ADMIN — LEVETIRACETAM 500 MG: 250 TABLET, FILM COATED ORAL at 10:09

## 2024-09-28 RX ADMIN — LORAZEPAM 1 MG: 2 INJECTION INTRAMUSCULAR; INTRAVENOUS at 11:09

## 2024-09-28 RX ADMIN — HYDROMORPHONE HYDROCHLORIDE 1 MG: 1 INJECTION, SOLUTION INTRAMUSCULAR; INTRAVENOUS; SUBCUTANEOUS at 07:09

## 2024-09-28 RX ADMIN — FLUOXETINE HYDROCHLORIDE 40 MG: 20 CAPSULE ORAL at 10:09

## 2024-09-28 RX ADMIN — HYDROMORPHONE HYDROCHLORIDE 1 MG: 1 INJECTION, SOLUTION INTRAMUSCULAR; INTRAVENOUS; SUBCUTANEOUS at 08:09

## 2024-09-28 RX ADMIN — PREDNISOLONE ACETATE 1 DROP: 10 SUSPENSION/ DROPS OPHTHALMIC at 08:09

## 2024-09-28 RX ADMIN — EPOETIN ALFA-EPBX 2360 UNITS: 4000 INJECTION, SOLUTION INTRAVENOUS; SUBCUTANEOUS at 11:09

## 2024-09-28 RX ADMIN — LORAZEPAM 1 MG: 2 INJECTION INTRAMUSCULAR; INTRAVENOUS at 10:09

## 2024-09-28 RX ADMIN — PANTOPRAZOLE SODIUM 40 MG: 40 TABLET, DELAYED RELEASE ORAL at 10:09

## 2024-09-28 RX ADMIN — SEVELAMER CARBONATE 800 MG: 800 TABLET, FILM COATED ORAL at 05:09

## 2024-09-28 RX ADMIN — PROMETHAZINE HYDROCHLORIDE 12.5 MG: 25 INJECTION INTRAMUSCULAR; INTRAVENOUS at 05:09

## 2024-09-28 RX ADMIN — HYDROMORPHONE HYDROCHLORIDE 1 MG: 1 INJECTION, SOLUTION INTRAMUSCULAR; INTRAVENOUS; SUBCUTANEOUS at 02:09

## 2024-09-28 RX ADMIN — SUCRALFATE 1 G: 1 TABLET ORAL at 08:09

## 2024-09-28 RX ADMIN — LEVETIRACETAM 500 MG: 250 TABLET, FILM COATED ORAL at 08:09

## 2024-09-28 RX ADMIN — LORAZEPAM 1 MG: 2 INJECTION INTRAMUSCULAR; INTRAVENOUS at 04:09

## 2024-09-28 RX ADMIN — LORAZEPAM 1 MG: 2 INJECTION INTRAMUSCULAR; INTRAVENOUS at 05:09

## 2024-09-28 RX ADMIN — PREDNISOLONE ACETATE 1 DROP: 10 SUSPENSION/ DROPS OPHTHALMIC at 10:09

## 2024-09-28 NOTE — PROGRESS NOTES
INPATIENT NEPHROLOGY progress  [facility] NEPHROLOGY    Tabby Howard  09/28/2024    Reason for consultation:  esrd    Chief Complaint:   Chief Complaint   Patient presents with    Flank Pain     Left side flank pain, N&V starting this morning     History of Present Illness:    Per H and P  Tabby Howard is a 34 year old female with a previous medical history of ESRD on dialysis Tue/Thur/Sat, chronic abdominal pain, HTN, Type II Diabetes, who presented to the ED  for left-sided flank pain. HPI is limited to chart review and ED note due to patient not being cooperative.  Per chart review, patient has multiple similar episodes in the past and has had multiple previous CT scans performed.  Also has had multiple hospitalizations for DKA in the past. Patient reports last dialysis was Monday 9/9/24 due to them scheduling her that day. She was unable to attend her normal Thursday session due to severe abdominal pain and vomiting. Of note she has had 5 ER visits over the past five days and most recent admission was on 9/10/24. She was admitted for DKA and insulin drip discontinued the next day. She received dialysis along with an EGD that showed mild gastritis. Initial ED evaluation showed elevated anion gap and betahydroxybutyrate but low normal PH.      9/21  pressures low, but stable.  Lab results reviewed.  Seen today on dialysis.  Has hypotension, but alert and oriented.    9/22  pressures actually high now--will resume home hydralazine at low dose.  Lab results reviewed.  Still w/same pain, no other complaints.  0/23 VSS. Plan HD tomorrow or PRN.  9/24 VSS. HD  today. OK to dc home if stable after HD.  9/25 VSS. HD tomorrow. Ongoing symptoms with n/v, pain...  9/26 VSS. HD tomorrow. Still in a lot of pain.  9/27  hypertensive, Patient seen on hemodialysis for uremic clearance and ultrafiltration.        Plan of Care:    ESRD on HD TTS  Hyponatremia  Acidosis  --continue dialysis per routine  --renal dose medication  per routine  --pt with 17 hospital admissions in last 12 months    Anemia of CKD  --erythropoiesis stimulating agent with renal replacement therapy    Secondary HPT  --renal diet as tolerated  --continue binders with meals if tolerated    Hypertension  --uf with hd as tolerated  --low salt diet as tolerated  --hydralazine to 50mg po tid    DM  Gastroparesis   --management per primary team    Thank you for allowing us to participate in this patient's care. We will continue to follow.    Vital Signs:  Temp Readings from Last 3 Encounters:   24 98.6 °F (37 °C) (Oral)   24 98.4 °F (36.9 °C) (Oral)   24 98 °F (36.7 °C)       Pulse Readings from Last 3 Encounters:   24 110   24 94   24 92       BP Readings from Last 3 Encounters:   24 (!) 165/106   24 122/75   24 (!) 180/99       Weight:  Wt Readings from Last 3 Encounters:   24 48.3 kg (106 lb 7.7 oz)   24 52.6 kg (116 lb)   24 52.6 kg (116 lb)       Past Medical & Surgical History:  Past Medical History:   Diagnosis Date    ESRD on hemodialysis 2022    Gastritis 2022    EGD was 22    Gastroparesis 2022    has not had Emptying study    Heart failure with preserved ejection fraction 2022    EF 55% on 3/22    History of supraventricular tachycardia     Hyperkalemia 2022    Hypertensive emergency 2022    Sickle cell trait 2022    Type 2 diabetes mellitus        Past Surgical History:   Procedure Laterality Date     SECTION      x 3    COLONOSCOPY      COLONOSCOPY N/A 2022    Procedure: COLONOSCOPY;  Surgeon: Jagdeep Cedeno MD;  Location: [facility];  Service: Endoscopy;  Laterality: N/A;    DESTRUCTION, CILIARY BODY, USING LASER Left 2024    Procedure: Cyclophotocoagulation G-probe, retrobulbar chlorpromazine left eye;  Surgeon: Fidel Wise MD;  Location: [address];  Service: Ophthalmology;  Laterality: Left;    ENDOSCOPIC  ULTRASOUND OF UPPER GASTROINTESTINAL TRACT N/A 12/16/2023    Procedure: ULTRASOUND, UPPER GI TRACT, ENDOSCOPIC;  Surgeon: Micky Paredes III, MD;  Location: [facility];  Service: Endoscopy;  Laterality: N/A;    ESOPHAGOGASTRODUODENOSCOPY N/A 10/18/2019    Procedure: ESOPHAGOGASTRODUODENOSCOPY (EGD);  Surgeon: Gianluca Mendez MD;  Location: [facility];  Service: Endoscopy;  Laterality: N/A;    ESOPHAGOGASTRODUODENOSCOPY N/A 08/24/2022    Procedure: EGD (ESOPHAGOGASTRODUODENOSCOPY);  Surgeon: Micky Paredes III, MD;  Location: [facility];  Service: Endoscopy;  Laterality: N/A;    ESOPHAGOGASTRODUODENOSCOPY N/A 12/05/2022    Procedure: EGD (ESOPHAGOGASTRODUODENOSCOPY);  Surgeon: Marcelo Zhong MD;  Location: [facility];  Service: Endoscopy;  Laterality: N/A;    ESOPHAGOGASTRODUODENOSCOPY N/A 9/13/2024    Procedure: EGD (ESOPHAGOGASTRODUODENOSCOPY);  Surgeon: Marcelo Zhong MD;  Location: [facility];  Service: Endoscopy;  Laterality: N/A;    EYE SURGERY      INSERTION, CATHETER, TUNNELED N/A 06/17/2023    Procedure: Insertion,catheter,tunneled;  Surgeon: Carlos Thurman Jr., MD;  Location: [facility];  Service: General;  Laterality: N/A;    LAPAROSCOPIC CHOLECYSTECTOMY N/A 07/30/2022    Procedure: CHOLECYSTECTOMY, LAPAROSCOPIC;  Surgeon: Grey Perez MD;  Location: [facility];  Service: General;  Laterality: N/A;    PLACEMENT OF DUAL-LUMEN VASCULAR CATHETER Left 07/12/2022    Procedure: INSERTION-CATHETER-JOSEPH;  Surgeon: Dionte Gan MD;  Location: [facility] OR;  Service: General;  Laterality: Left;    PLACEMENT OF DUAL-LUMEN VASCULAR CATHETER Right 07/26/2022    Procedure: INSERTION-CATHETER-Hemosplit;  Surgeon: Dionte Gan MD;  Location: [facility] OR;  Service: General;  Laterality: Right;       Past Social History:  Social History     Socioeconomic History    Marital status:    Tobacco Use    Smoking status: Never    Smokeless tobacco: Never   Substance and Sexual Activity    Alcohol use: Not  Currently    Drug use: No    Sexual activity: Not Currently     Partners: Male     Birth control/protection: I.U.D.     Social Drivers of Health     Financial Resource Strain: Low Risk  (8/29/2024)    Overall Financial Resource Strain (CARDIA)     Difficulty of Paying Living Expenses: Not hard at all   Recent Concern: Financial Resource Strain - Medium Risk (8/25/2024)    Overall Financial Resource Strain (CARDIA)     Difficulty of Paying Living Expenses: Somewhat hard   Food Insecurity: No Food Insecurity (8/29/2024)    Hunger Vital Sign     Worried About Running Out of Food in the Last Year: Never true     Ran Out of Food in the Last Year: Never true   Recent Concern: Food Insecurity - Food Insecurity Present (8/25/2024)    Hunger Vital Sign     Worried About Running Out of Food in the Last Year: Often true     Ran Out of Food in the Last Year: Often true   Transportation Needs: No Transportation Needs (8/29/2024)    TRANSPORTATION NEEDS     Transportation : No   Physical Activity: Sufficiently Active (8/29/2024)    Exercise Vital Sign     Days of Exercise per Week: 5 days     Minutes of Exercise per Session: 30 min   Recent Concern: Physical Activity - Inactive (8/25/2024)    Exercise Vital Sign     Days of Exercise per Week: 0 days     Minutes of Exercise per Session: 0 min   Stress: No Stress Concern Present (8/29/2024)    [place] citizen [place] [employer] - Occupational Stress Questionnaire     Feeling of Stress : Not at all   Recent Concern: Stress - Stress Concern Present (8/25/2024)    [place] citizen [place] [employer] - Occupational Stress Questionnaire     Feeling of Stress : Rather much   Housing Stability: Low Risk  (8/29/2024)    Housing Stability Vital Sign     Unable to Pay for Housing in the Last Year: No     Homeless in the Last Year: No   Recent Concern: Housing Stability - High Risk (8/25/2024)    Housing Stability Vital Sign     Unable to Pay for Housing in the Last Year: Yes      Homeless in the Last Year: No       Medications:  No current facility-administered medications on file prior to encounter.     Current Outpatient Medications on File Prior to Encounter   Medication Sig Dispense Refill    albuterol (VENTOLIN HFA) 90 mcg/actuation inhaler Inhale 2 puffs every 4 hours by inhalation route. 8 g 2    apixaban (ELIQUIS) 5 mg Tab Take 1 tablet (5 mg total) by mouth 2 (two) times daily. Patient w/ thrombocytopenia <50, so held during admission, follow-up with hematologist about restarting 180 tablet 1    brinzolamide (AZOPT) 1 % ophthalmic suspension Place1 drop in left eye 3 times a day for 30 days 15 mL 6    cetirizine (ZYRTEC) 10 MG tablet Take 1 tablet every day by oral route. 30 tablet 0    FLUoxetine 40 MG capsule Take 1 capsule every day by oral route. 30 capsule 2    fluticasone propionate (FLONASE ALLERGY RELIEF) 50 mcg/actuation nasal spray [place] 1 spray every day by intranasal route. 16 g 2    gabapentin (NEURONTIN) 300 MG capsule Take 2 capsules twice a day by oral route for 90 days. 360 capsule 1    insulin aspart U-100 (NOVOLOG) 100 unit/mL (3 mL) InPn pen Inject 8 Units into the skin 3 (three) times daily with meals. 15 mL 0    insulin glargine U-100, Lantus, (LANTUS SOLOSTAR U-100 INSULIN) 100 unit/mL (3 mL) InPn pen Inject 22 units every day by subcutaneous route in the evening for 30 days, for diabetes. 15 mL 2    levETIRAcetam (KEPPRA) 500 MG Tab Take 1 tablet (500 mg total) by mouth 2 (two) times daily. 60 tablet 11    ondansetron (ZOFRAN-ODT) 4 MG TbDL Place 1 tablet (4 mg) on or under the tongue every 8 hours for 10 days. 24 tablet 2    pantoprazole (PROTONIX) 40 MG tablet Take 1 tablet (40 mg total) by mouth once daily. 30 tablet 0    prednisoLONE acetate (PRED FORTE) 1 % DrpS Place 1 drop into the left eye 2 (two) times daily. 5 mL 3    promethazine (PHENERGAN) 25 MG tablet Take 1 tablet (25 mg total) by mouth every 6 (six) hours as needed. 15 tablet 0    sevelamer  "carbonate (RENVELA) 800 mg Tab Take 1 tablet (800 mg total) by mouth 3 (three) times daily with meals. 180 tablet 11    sucralfate (CARAFATE) 1 gram tablet Take 1 tablet (1 g total) by mouth 4 (four) times daily. 100 tablet 1    atropine 1% (ISOPTO ATROPINE) 1 % Drop Place 1 drop into the left eye 2 (two) times a day. 15 mL 3    blood sugar diagnostic (TRUE METRIX GLUCOSE TEST STRIP) Strp Use 1 strip to check blood glucose three times daily 100 each 11    blood-glucose meter (TRUE METRIX GLUCOSE METER) Misc Use to check blood glucose 1 each 0    blood-glucose meter,continuous Misc USE WITH SENSORS 1 each 0    blood-glucose sensor (DEXCOM G7 SENSOR) Heather Use as directed for CGM. Change sensor every 10 days 3 each 0    blood-glucose sensor Heather CHANGE EVERY 10 DAYS 3 each 0    brimonidine 0.15 % OPTH DROP (ALPHAGAN) 0.15 % ophthalmic solution Place 1 drop into both eyes 3 (three) times daily. 15 mL 3    busPIRone (BUSPAR) 10 MG tablet Take 1 tablet (10 mg total) by mouth 3 (three) times daily as needed (anxiety). 60 tablet 5    dorzolamide-timolol 2-0.5% (COSOPT) 22.3-6.8 mg/mL ophthalmic solution place 1 drop in left eye twice a day  for 30 days 10 mL 0    lancets (TRUEPLUS LANCETS) 33 gauge Misc Use 1 to check blood glucose three times daily 100 each 11    pen needle, diabetic 31 gauge x 3/16" Ndle Use as directed with insulin once daily 100 each 0    timolol maleate 0.5% (TIMOPTIC) 0.5 % Drop Place 1 drop in left eye 2 times a day for 30 days 5 mL 6    [DISCONTINUED] atenoloL (TENORMIN) 50 MG tablet Take 1 tablet (50 mg total) by mouth every other day. 45 tablet 3    [DISCONTINUED] insulin detemir U-100, Levemir, (LEVEMIR FLEXTOUCH U100 INSULIN) 100 unit/mL (3 mL) InPn pen Inject 22 units every day by subcutaneous route in the evening for 90 days. 18 mL 1    [DISCONTINUED] omeprazole (PRILOSEC) 20 MG capsule Take 2 capsules (40 mg total) by mouth once daily. for 10 days 20 capsule 0     Scheduled Meds:   apixaban  " "2.5 mg Oral BID    epoetin teresa-epbx  50 Units/kg Intravenous Every Tues, Thurs, Sat    FLUoxetine  40 mg Oral Daily    hydrALAZINE  25 mg Oral Q8H    insulin glargine U-100 (Lantus)  20 Units Subcutaneous QHS    levETIRAcetam  500 mg Oral BID    pantoprazole  40 mg Oral Daily    prednisoLONE acetate  1 drop Left Eye BID    sevelamer carbonate  800 mg Oral TID WM    sucralfate  1 g Oral QID     Continuous Infusions:  PRN Meds:.  Current Facility-Administered Medications:     acetaminophen, 650 mg, Oral, Q4H PRN    albuterol-ipratropium, 3 mL, Nebulization, Q6H PRN    aluminum-magnesium hydroxide-simethicone, 30 mL, Oral, QID PRN    dextrose 10%, 12.5 g, Intravenous, PRN    dextrose 10%, 25 g, Intravenous, PRN    HYDROcodone-acetaminophen, 1 tablet, Oral, Q4H PRN    HYDROmorphone, 1 mg, Intravenous, Q4H PRN    insulin aspart U-100, 0-10 Units, Subcutaneous, QID (AC + HS) PRN    lorazepam, 1 mg, Intravenous, Q6H PRN    naloxone, 0.02 mg, Intravenous, PRN    promethazine (PHENERGAN) 12.5 mg in 0.9% NaCl 50 mL IVPB, 12.5 mg, Intravenous, Q6H PRN    simethicone, 1 tablet, Oral, QID PRN    sodium chloride 0.9%, 10 mL, Intravenous, Q12H PRN    Allergies:  Droperidol, Haldol [haloperidol lactate], Penicillins, and Droperidol (bulk)    Past Family History:  Reviewed; refer to Hospitalist Admission Note    Review of Systems:  Review of Systems - All 14 systems reviewed and negative, except as noted in HPI    Physical Exam:    BP (!) 165/106 (BP Location: Right arm, Patient Position: Lying)   Pulse 110   Temp 98.6 °F (37 °C) (Oral)   Resp (!) 22   Ht 5' 2" (1.575 m)   Wt 48.3 kg (106 lb 7.7 oz)   LMP  (LMP Unknown) Comment: pt states last mentral cycle was 3yrs ago  SpO2 100%   Breastfeeding No   BMI 19.48 kg/m²     General Appearance:    Alert, cooperative, no distress, appears stated age   Head:    Normocephalic, without obvious abnormality, atraumatic   Eyes:    PER, conjunctiva/corneas clear, EOM's intact in both " "eyes        Throat:   Lips, mucosa, and tongue normal; teeth and gums normal   Back:     Symmetric, no curvature, ROM normal, no CVA tenderness   Lungs:     Clear to auscultation bilaterally, respirations unlabored   Chest wall:    No tenderness or deformity   Heart:    Regular rate and rhythm, S1 and S2 normal, no murmur, rub   or gallop   Abdomen:     Soft, non-tender, bowel sounds active all four quadrants,     no masses, no organomegaly   Extremities:   Extremities normal, atraumatic, no cyanosis or edema   Pulses:   2+ and symmetric all extremities   MSK:   No joint or muscle swelling, tenderness or deformity   Skin:   Skin color, texture, turgor normal, no rashes or lesions   Neurologic:   CNII-XII intact, normal strength and sensation       Throughout.  No flap     Results:  Lab Results   Component Value Date     09/28/2024    K 4.1 09/28/2024     09/28/2024    CO2 24 09/28/2024    BUN 33 (H) 09/28/2024    CREATININE 7.9 (H) 09/28/2024    CALCIUM 10.4 09/28/2024    ANIONGAP 15 09/28/2024    ESTGFRAFRICA 19 (L) 09/03/2022    EGFRNONAA 18 (A) 07/31/2022       Lab Results   Component Value Date    CALCIUM 10.4 09/28/2024    PHOS 2.5 (L) 09/22/2024       No results for input(s): "WBC", "RBC", "HGB", "HCT", "PLT", "MCV", "MCH", "MCHC" in the last 24 hours.    Hugh Figueroa MD    [facility] [place]  [phone]     "

## 2024-09-28 NOTE — CARE UPDATE
09/27/24 1935   Patient Assessment/Suction   Level of Consciousness (AVPU) alert   Respiratory Effort Unlabored   Expansion/Accessory Muscles/Retractions no use of accessory muscles;no retractions   PRE-TX-O2   Device (Oxygen Therapy) room air   SpO2 100 %   Pulse Oximetry Type Intermittent   $ Pulse Oximetry - Multiple Charge Pulse Oximetry - Multiple   Pulse 105   Resp 20   Aerosol Therapy   $ Aerosol Therapy Charges Refused

## 2024-09-28 NOTE — PLAN OF CARE
Problem: Adult Inpatient Plan of Care  Goal: Plan of Care Review  Outcome: Progressing     Problem: Adult Inpatient Plan of Care  Goal: Absence of Hospital-Acquired Illness or Injury  Outcome: Progressing     Problem: Adult Inpatient Plan of Care  Goal: Optimal Comfort and Wellbeing  Outcome: Progressing     Problem: Diabetes Comorbidity  Goal: Blood Glucose Level Within Targeted Range  Outcome: Progressing     Problem: Wound  Goal: Absence of Infection Signs and Symptoms  Outcome: Progressing     Problem: Fall Injury Risk  Goal: Absence of Fall and Fall-Related Injury  Outcome: Progressing

## 2024-09-28 NOTE — SUBJECTIVE & OBJECTIVE
Interval History:   Seen and examined at multidisciplinary rounds, patient is doing dialysis, still complaining of the abdominal pain, patient well-known to me patient has chronic abdominal pain and has frequent hospitalizations request IV Ativan/ Dilaudid, possible drug-seeking behavior.  Patient also has well-established diagnosis of gastroparesis, previous can not tolerate IV Reglan due to QTC prolongation.     Review of Systems  Objective:     Vital Signs (Most Recent):  Temp: 98.2 °F (36.8 °C) (09/28/24 1329)  Pulse: 100 (09/28/24 1329)  Resp: 18 (09/28/24 1332)  BP: 110/64 (09/28/24 1329)  SpO2: 100 % (09/28/24 1329) Vital Signs (24h Range):  Temp:  [98.1 °F (36.7 °C)-98.9 °F (37.2 °C)] 98.2 °F (36.8 °C)  Pulse:  [] 100  Resp:  [17-22] 18  SpO2:  [94 %-100 %] 100 %  BP: ()/() 110/64     Weight: 48.3 kg (106 lb 7.7 oz)  Body mass index is 19.48 kg/m².    Intake/Output Summary (Last 24 hours) at 9/28/2024 1356  Last data filed at 9/28/2024 1329  Gross per 24 hour   Intake 720 ml   Output 1500 ml   Net -780 ml         Physical Exam  Vitals reviewed.   Constitutional:       Appearance: She is ill-appearing.      Comments: Chronically ill appearing   HENT:      Head: Normocephalic and atraumatic.      Nose: Nose normal.      Mouth/Throat:      Mouth: Mucous membranes are dry.      Pharynx: Oropharynx is clear.   Eyes:      Conjunctiva/sclera:      Left eye: Hemorrhage present.   Cardiovascular:      Rate and Rhythm: Regular rhythm.      Pulses:           Radial pulses are 2+ on the right side and 2+ on the left side.        Dorsalis pedis pulses are 2+ on the right side and 2+ on the left side.      Arteriovenous access: Left arteriovenous access is present.  Pulmonary:      Effort: Pulmonary effort is normal.      Breath sounds: Normal breath sounds.   Abdominal:      General: Bowel sounds are normal.      Palpations: Abdomen is soft.      Tenderness: There is generalized abdominal tenderness.  "  Musculoskeletal:         General: Normal range of motion.      Cervical back: Normal range of motion and neck supple.      Right lower leg: No edema.      Left lower leg: No edema.   Skin:     General: Skin is warm and dry.      Capillary Refill: Capillary refill takes less than 2 seconds.   Neurological:      General: No focal deficit present.      Mental Status: She is alert and oriented to person, place, and time. Mental status is at baseline.   Psychiatric:         Attention and Perception: Attention normal.         Behavior: Behavior is withdrawn       Significant Labs: All pertinent labs within the past 24 hours have been reviewed.  CBC:   No results for input(s): "WBC", "HGB", "HCT", "PLT" in the last 48 hours.    CMP:   Recent Labs   Lab 09/27/24  0420 09/28/24  0503    140   K 4.0 4.1    101   CO2 24 24   * 242*   BUN 19 33*   CREATININE 5.4* 7.9*   CALCIUM 10.0 10.4   PROT 7.1 7.1   ALBUMIN 3.5 3.5   BILITOT 1.0 1.1*   ALKPHOS 83 88   AST 16 16   ALT 11 10   ANIONGAP 15 15       Significant Imaging: I have reviewed all pertinent imaging results/findings within the past 24 hours.  Physical Exam      Scheduled Meds:   apixaban  2.5 mg Oral BID    epoetin teresa-epbx  50 Units/kg Intravenous Every Tues, Thurs, Sat    FLUoxetine  40 mg Oral Daily    hydrALAZINE  50 mg Oral Q8H    insulin glargine U-100 (Lantus)  20 Units Subcutaneous QHS    levETIRAcetam  500 mg Oral BID    pantoprazole  40 mg Oral Daily    prednisoLONE acetate  1 drop Left Eye BID    sevelamer carbonate  800 mg Oral TID WM    sucralfate  1 g Oral QID     Continuous Infusions:  PRN Meds:.  Current Facility-Administered Medications:     acetaminophen, 650 mg, Oral, Q4H PRN    albuterol-ipratropium, 3 mL, Nebulization, Q6H PRN    aluminum-magnesium hydroxide-simethicone, 30 mL, Oral, QID PRN    dextrose 10%, 12.5 g, Intravenous, PRN    dextrose 10%, 25 g, Intravenous, PRN    HYDROcodone-acetaminophen, 1 tablet, Oral, Q4H PRN   "  HYDROmorphone, 1 mg, Intravenous, Q4H PRN    insulin aspart U-100, 0-10 Units, Subcutaneous, QID (AC + HS) PRN    lorazepam, 1 mg, Intravenous, Q6H PRN    naloxone, 0.02 mg, Intravenous, PRN    promethazine (PHENERGAN) 12.5 mg in 0.9% NaCl 50 mL IVPB, 12.5 mg, Intravenous, Q6H PRN    simethicone, 1 tablet, Oral, QID PRN    sodium chloride 0.9%, 10 mL, Intravenous, Q12H PRN      X-Ray Chest AP Portable    Result Date: 9/17/2024  EXAMINATION: XR CHEST AP PORTABLE CLINICAL HISTORY: hyperglycemia; TECHNIQUE: Single frontal view of the chest was performed. COMPARISON: Chest x-ray of September 14, 2024 FINDINGS: The lungs are clear, with normal appearance of pulmonary vasculature and no pleural effusion or pneumothorax. The cardiac silhouette is normal in size. The hilar and mediastinal contours are unremarkable. Bones are intact.     No acute abnormality. Electronically signed by: Gianluca Arriaga MD Date:    09/17/2024 Time:    15:07    X-Ray Chest 1 View    Result Date: 9/14/2024  EXAMINATION: XR CHEST 1 VIEW CLINICAL HISTORY: Shortness of breath TECHNIQUE: Single frontal view of the chest was performed. COMPARISON: 08/28/2024 FINDINGS: Enlarged cardiac silhouette.  No focal consolidation or pleural effusion.     As above. Electronically signed by: Jeovany Herrmann Date:    09/14/2024 Time:    22:47    CT Abdomen Pelvis  Without Contrast    Result Date: 9/11/2024  EXAMINATION: CT ABDOMEN PELVIS WITHOUT CONTRAST CLINICAL HISTORY: Abdominal pain, acute, nonlocalized; TECHNIQUE: Low dose axial images, sagittal and coronal reformations were obtained from the lung bases to the pubic symphysis.  Oral contrast was not administered. COMPARISON: Multiple priors, most recent 08/28/2024 FINDINGS: Mild cardiomegaly, unchanged.  Small pericardial effusion measuring simple fluid attenuation, decreased compared to prior.  Basilar airspace opacities improved. Please note in the absence of IV contra valuation for solid organ mass  is limited. Liver is normal in morphology and with smooth contour.  Gallbladder surgically absent.  No intra or extrahepatic biliary ductal dilatation. Atrophic kidneys.  No hydronephrosis. The spleen adrenal glands and pancreas are normal. Gastric wall thickening and edema.  The abdominal aorta is normal in caliber with advanced calcification for patient's age including of the mesenteric and splenic arteries. No enlarged retroperitoneal lymph nodes. Normal appendix.  No bowel obstruction or inflammatory change.  No mid or free air. Circumferential bladder wall thickening.  Uterus and are unremarkable.  No enlarged pelvic lymph. Lower anterior abdominal subcutaneous edema similar prior, possibly from subcutaneous injections. No acute or aggressive osseous abnormality.     Findings concerning for infectious/inflammatory gastritis. Thick-walled bladder as may be seen in the setting of cystitis.  Correlate with urinalysis. Improved pericardial effusion and basilar opacities. Electronically signed by: Tayler Squires Date:    09/11/2024 Time:    11:22  - pulls last radiology orders

## 2024-09-28 NOTE — PROGRESS NOTES
[facility] Medicine  Progress Note    Patient Name: Tabby Howard  MRN: [id number]  Patient Class: IP- Inpatient   Admission Date: 9/19/2024  Length of Stay: 5 days  Attending Physician: Oneyda Hinds MD  Primary Care Provider: Melony Kim NP        Subjective:     Principal Problem:Chronic abdominal pain        HPI:  Tabby Howard is a 34 year old female with a previous medical history of ESRD on dialysis Tue/Thur/Sat, chronic abdominal pain, HTN, Type II Diabetes, who presented to the ED  for left-sided flank pain. HPI is limited to chart review and ED note due to patient not being cooperative.  Per chart review, patient has multiple similar episodes in the past and has had multiple previous CT scans performed.  Also has had multiple hospitalizations for DKA in the past. Patient reports last dialysis was Monday 9/9/24 due to them scheduling her that day. She was unable to attend her normal Thursday session due to severe abdominal pain and vomiting. Of note she has had 5 ER visits over the past five days and most recent admission was on 9/10/24. She was admitted for DKA and insulin drip discontinued the next day. She received dialysis along with an EGD that was negative for any acute process. Initial ED evaluation showed elevated anion gap and betahydroxybutyrate but low normal PH. Dr. Bradford consulted and he placed orders for dialysis. Pain and nausea controlled with ativan and phenergan. Patient admitted by hospital medicine for further evaluation and management.     Overview/Hospital Course:  Patient is a 34 year old patient with diabetes and ESRD on HD, frequent hospital admissions for multiple complains, chronic abdominal pain. Patient was recently discharged few days ago. Patient came back with worsening pain on the left flank, nausea and vomiting. Patient is euglycemic. However beta hydroxybutyrate was elevated. Patient was hydrated. Acidosis improved. The patient  continued to c/o abdominal pain. Prn pain meds were given. The patient developed n/v once again, and prn antiemetics were given.    Interval History:   Seen and examined at multidisciplinary rounds, patient is doing dialysis, still complaining of the abdominal pain, patient well-known to me patient has chronic abdominal pain and has frequent hospitalizations request IV Ativan/ Dilaudid, possible drug-seeking behavior.  Patient also has well-established diagnosis of gastroparesis, previous can not tolerate IV Reglan due to QTC prolongation.     Review of Systems  Objective:     Vital Signs (Most Recent):  Temp: 98.2 °F (36.8 °C) (09/28/24 1329)  Pulse: 100 (09/28/24 1329)  Resp: 18 (09/28/24 1332)  BP: 110/64 (09/28/24 1329)  SpO2: 100 % (09/28/24 1329) Vital Signs (24h Range):  Temp:  [98.1 °F (36.7 °C)-98.9 °F (37.2 °C)] 98.2 °F (36.8 °C)  Pulse:  [] 100  Resp:  [17-22] 18  SpO2:  [94 %-100 %] 100 %  BP: ()/() 110/64     Weight: 48.3 kg (106 lb 7.7 oz)  Body mass index is 19.48 kg/m².    Intake/Output Summary (Last 24 hours) at 9/28/2024 1356  Last data filed at 9/28/2024 1329  Gross per 24 hour   Intake 720 ml   Output 1500 ml   Net -780 ml         Physical Exam  Vitals reviewed.   Constitutional:       Appearance: She is ill-appearing.      Comments: Chronically ill appearing   HENT:      Head: Normocephalic and atraumatic.      Nose: Nose normal.      Mouth/Throat:      Mouth: Mucous membranes are dry.      Pharynx: Oropharynx is clear.   Eyes:      Conjunctiva/sclera:      Left eye: Hemorrhage present.   Cardiovascular:      Rate and Rhythm: Regular rhythm.      Pulses:           Radial pulses are 2+ on the right side and 2+ on the left side.        Dorsalis pedis pulses are 2+ on the right side and 2+ on the left side.      Arteriovenous access: Left arteriovenous access is present.  Pulmonary:      Effort: Pulmonary effort is normal.      Breath sounds: Normal breath sounds.   Abdominal:      " General: Bowel sounds are normal.      Palpations: Abdomen is soft.      Tenderness: There is generalized abdominal tenderness.   Musculoskeletal:         General: Normal range of motion.      Cervical back: Normal range of motion and neck supple.      Right lower leg: No edema.      Left lower leg: No edema.   Skin:     General: Skin is warm and dry.      Capillary Refill: Capillary refill takes less than 2 seconds.   Neurological:      General: No focal deficit present.      Mental Status: She is alert and oriented to person, place, and time. Mental status is at baseline.   Psychiatric:         Attention and Perception: Attention normal.         Behavior: Behavior is withdrawn       Significant Labs: All pertinent labs within the past 24 hours have been reviewed.  CBC:   No results for input(s): "WBC", "HGB", "HCT", "PLT" in the last 48 hours.    CMP:   Recent Labs   Lab 09/27/24  0420 09/28/24  0503    140   K 4.0 4.1    101   CO2 24 24   * 242*   BUN 19 33*   CREATININE 5.4* 7.9*   CALCIUM 10.0 10.4   PROT 7.1 7.1   ALBUMIN 3.5 3.5   BILITOT 1.0 1.1*   ALKPHOS 83 88   AST 16 16   ALT 11 10   ANIONGAP 15 15       Significant Imaging: I have reviewed all pertinent imaging results/findings within the past 24 hours.  Physical Exam      Scheduled Meds:   apixaban  2.5 mg Oral BID    epoetin teresa-epbx  50 Units/kg Intravenous Every Tues, Thurs, Sat    FLUoxetine  40 mg Oral Daily    hydrALAZINE  50 mg Oral Q8H    insulin glargine U-100 (Lantus)  20 Units Subcutaneous QHS    levETIRAcetam  500 mg Oral BID    pantoprazole  40 mg Oral Daily    prednisoLONE acetate  1 drop Left Eye BID    sevelamer carbonate  800 mg Oral TID WM    sucralfate  1 g Oral QID     Continuous Infusions:  PRN Meds:.  Current Facility-Administered Medications:     acetaminophen, 650 mg, Oral, Q4H PRN    albuterol-ipratropium, 3 mL, Nebulization, Q6H PRN    aluminum-magnesium hydroxide-simethicone, 30 mL, Oral, QID PRN    " dextrose 10%, 12.5 g, Intravenous, PRN    dextrose 10%, 25 g, Intravenous, PRN    HYDROcodone-acetaminophen, 1 tablet, Oral, Q4H PRN    HYDROmorphone, 1 mg, Intravenous, Q4H PRN    insulin aspart U-100, 0-10 Units, Subcutaneous, QID (AC + HS) PRN    lorazepam, 1 mg, Intravenous, Q6H PRN    naloxone, 0.02 mg, Intravenous, PRN    promethazine (PHENERGAN) 12.5 mg in 0.9% NaCl 50 mL IVPB, 12.5 mg, Intravenous, Q6H PRN    simethicone, 1 tablet, Oral, QID PRN    sodium chloride 0.9%, 10 mL, Intravenous, Q12H PRN      X-Ray Chest AP Portable    Result Date: 9/17/2024  EXAMINATION: XR CHEST AP PORTABLE CLINICAL HISTORY: hyperglycemia; TECHNIQUE: Single frontal view of the chest was performed. COMPARISON: Chest x-ray of September 14, 2024 FINDINGS: The lungs are clear, with normal appearance of pulmonary vasculature and no pleural effusion or pneumothorax. The cardiac silhouette is normal in size. The hilar and mediastinal contours are unremarkable. Bones are intact.     No acute abnormality. Electronically signed by: Gianluca Arriaga MD Date:    09/17/2024 Time:    15:07    X-Ray Chest 1 View    Result Date: 9/14/2024  EXAMINATION: XR CHEST 1 VIEW CLINICAL HISTORY: Shortness of breath TECHNIQUE: Single frontal view of the chest was performed. COMPARISON: 08/28/2024 FINDINGS: Enlarged cardiac silhouette.  No focal consolidation or pleural effusion.     As above. Electronically signed by: Jeovany Herrmann Date:    09/14/2024 Time:    22:47    CT Abdomen Pelvis  Without Contrast    Result Date: 9/11/2024  EXAMINATION: CT ABDOMEN PELVIS WITHOUT CONTRAST CLINICAL HISTORY: Abdominal pain, acute, nonlocalized; TECHNIQUE: Low dose axial images, sagittal and coronal reformations were obtained from the lung bases to the pubic symphysis.  Oral contrast was not administered. COMPARISON: Multiple priors, most recent 08/28/2024 FINDINGS: Mild cardiomegaly, unchanged.  Small pericardial effusion measuring simple fluid attenuation,  decreased compared to prior.  Basilar airspace opacities improved. Please note in the absence of IV contra valuation for solid organ mass is limited. Liver is normal in morphology and with smooth contour.  Gallbladder surgically absent.  No intra or extrahepatic biliary ductal dilatation. Atrophic kidneys.  No hydronephrosis. The spleen adrenal glands and pancreas are normal. Gastric wall thickening and edema.  The abdominal aorta is normal in caliber with advanced calcification for patient's age including of the mesenteric and splenic arteries. No enlarged retroperitoneal lymph nodes. Normal appendix.  No bowel obstruction or inflammatory change.  No mid or free air. Circumferential bladder wall thickening.  Uterus and are unremarkable.  No enlarged pelvic lymph. Lower anterior abdominal subcutaneous edema similar prior, possibly from subcutaneous injections. No acute or aggressive osseous abnormality.     Findings concerning for infectious/inflammatory gastritis. Thick-walled bladder as may be seen in the setting of cystitis.  Correlate with urinalysis. Improved pericardial effusion and basilar opacities. Electronically signed by: Tayler Squires Date:    09/11/2024 Time:    11:22  - pulls last radiology orders      Assessment/Plan:      * Chronic abdominal pain  Chronic problem, previous extensive workup including abdominal CT and EGD only shows gastritis.   Patient has long history of gastroparesis.   Patient has multiple hospitalizations for chronic abdominal pain, asking for IV Ativan/ IV Dilaudid.   Possible drug-seeking behavior.   Patient has brittle diabetes as well.   Patient's previous has long QTC, we will repeat EKG, may okay to use IV Reglan if needed.       High anion gap metabolic acidosis  Resolved          ESRD (end stage renal disease) on dialysis  Dialysis as per nephrology      Diabetes  SSI    Seizures  Home meds      Hypertension  Home meds      VTE Risk Mitigation (From admission, onward)            Ordered     apixaban tablet 2.5 mg  2 times daily         09/27/24 1055     IP VTE HIGH RISK PATIENT  Once         09/19/24 1624     Place sequential compression device  Until discontinued         09/19/24 1624                    Discharge Planning   TATIANA:      Code Status: Full Code   Is the patient medically ready for discharge?:     Reason for patient still in hospital (select all that apply): Patient trending condition and Treatment  Discharge Plan A: Home with family                  Oneyda Hinds MD  Department of Hospital Medicine   [facility]/Surg

## 2024-09-28 NOTE — ASSESSMENT & PLAN NOTE
Chronic problem, previous extensive workup including abdominal CT and EGD only shows gastritis.   Patient has long history of gastroparesis.   Patient has multiple hospitalizations for chronic abdominal pain, asking for IV Ativan/ IV Dilaudid.   Possible drug-seeking behavior.   Patient has brittle diabetes as well.   Patient's previous has long QTC, we will repeat EKG, may okay to use IV Reglan if needed.

## 2024-09-28 NOTE — PROGRESS NOTES
09/28/24 1329   Handoff Report   Received From Stephanie Mena   Given To Christy   Vital Signs   Temp 98.2 °F (36.8 °C)   Temp Source Oral   Pulse 100   Heart Rate Source Monitor   Resp 18   SpO2 100 %   /64   MAP (mmHg) 82   BP Location Right arm   Patient Position Lying   Assessments (Pre/Post)   Safety vein preservation armband present   Blood Liters Processed (BLP) 56.1   Transport Modality not applicable   Level of Consciousness (AVPU) alert   Dialyzer Clearance moderately streaked        Hemodialysis AV Fistula Left upper arm   No placement date or time found.   Location: Left upper arm   Site Assessment Clean;Dry;Intact   Patency Present;Thrill;Bruit   Status Deaccessed   Flows Good   Dressing Intervention First dressing   Dressing Status Clean;Dry;Intact   Site Condition No complications   Dressing Gauze   Post-Hemodialysis Assessment   Rinseback Volume (mL) 250 mL   Blood Volume Processed (Liters) 56.1 L   Dialyzer Clearance Moderately streaked   Duration of Treatment 180 minutes   Additional Fluid Intake (mL) 500 mL   Total UF (mL) 1500 mL   Net Fluid Removal 1000   Patient Response to Treatment Tolerated   Post-Treatment Weight 45.6 kg (100 lb 8.5 oz)   Treatment Weight Change -1   Arterial bleeding stop time (min) 6 min   Venous bleeding stop time (min) 6 min   Post-Hemodialysis Comments Tx complete, pt stable     HD tolerated     Net UF 1 L    Uf goal decreased d/t asymptomatic hypotension during tx  Pt stable, educated on access maintenance

## 2024-09-29 LAB
ANION GAP SERPL CALC-SCNC: 21 MMOL/L (ref 8–16)
BUN SERPL-MCNC: 17 MG/DL (ref 6–20)
CALCIUM SERPL-MCNC: 10.8 MG/DL (ref 8.7–10.5)
CHLORIDE SERPL-SCNC: 101 MMOL/L (ref 95–110)
CO2 SERPL-SCNC: 20 MMOL/L (ref 23–29)
CREAT SERPL-MCNC: 5.2 MG/DL (ref 0.5–1.4)
EST. GFR  (NO RACE VARIABLE): 10 ML/MIN/1.73 M^2
GLUCOSE SERPL-MCNC: 201 MG/DL (ref 70–110)
POCT GLUCOSE: 164 MG/DL (ref 70–110)
POCT GLUCOSE: 194 MG/DL (ref 70–110)
POCT GLUCOSE: 201 MG/DL (ref 70–110)
POTASSIUM SERPL-SCNC: 4.3 MMOL/L (ref 3.5–5.1)
SODIUM SERPL-SCNC: 142 MMOL/L (ref 136–145)

## 2024-09-29 PROCEDURE — 63600175 PHARM REV CODE 636 W HCPCS

## 2024-09-29 PROCEDURE — 25000003 PHARM REV CODE 250: Performed by: INTERNAL MEDICINE

## 2024-09-29 PROCEDURE — 25000003 PHARM REV CODE 250

## 2024-09-29 PROCEDURE — 11000001 HC ACUTE MED/SURG PRIVATE ROOM

## 2024-09-29 PROCEDURE — 93010 ELECTROCARDIOGRAM REPORT: CPT | Mod: ,,, | Performed by: GENERAL PRACTICE

## 2024-09-29 PROCEDURE — 80048 BASIC METABOLIC PNL TOTAL CA: CPT | Performed by: HOSPITALIST

## 2024-09-29 PROCEDURE — 94761 N-INVAS EAR/PLS OXIMETRY MLT: CPT

## 2024-09-29 PROCEDURE — 63600175 PHARM REV CODE 636 W HCPCS: Performed by: INTERNAL MEDICINE

## 2024-09-29 PROCEDURE — 36415 COLL VENOUS BLD VENIPUNCTURE: CPT | Performed by: HOSPITALIST

## 2024-09-29 PROCEDURE — 99900035 HC TECH TIME PER 15 MIN (STAT)

## 2024-09-29 RX ADMIN — HYDRALAZINE HYDROCHLORIDE 50 MG: 25 TABLET ORAL at 09:09

## 2024-09-29 RX ADMIN — APIXABAN 2.5 MG: 2.5 TABLET, FILM COATED ORAL at 09:09

## 2024-09-29 RX ADMIN — HYDROMORPHONE HYDROCHLORIDE 1 MG: 1 INJECTION, SOLUTION INTRAMUSCULAR; INTRAVENOUS; SUBCUTANEOUS at 01:09

## 2024-09-29 RX ADMIN — HYDROMORPHONE HYDROCHLORIDE 1 MG: 1 INJECTION, SOLUTION INTRAMUSCULAR; INTRAVENOUS; SUBCUTANEOUS at 11:09

## 2024-09-29 RX ADMIN — LEVETIRACETAM 500 MG: 250 TABLET, FILM COATED ORAL at 09:09

## 2024-09-29 RX ADMIN — PREDNISOLONE ACETATE 1 DROP: 10 SUSPENSION/ DROPS OPHTHALMIC at 09:09

## 2024-09-29 RX ADMIN — HYDROMORPHONE HYDROCHLORIDE 1 MG: 1 INJECTION, SOLUTION INTRAMUSCULAR; INTRAVENOUS; SUBCUTANEOUS at 05:09

## 2024-09-29 RX ADMIN — SEVELAMER CARBONATE 800 MG: 800 TABLET, FILM COATED ORAL at 09:09

## 2024-09-29 RX ADMIN — HYDROMORPHONE HYDROCHLORIDE 1 MG: 1 INJECTION, SOLUTION INTRAMUSCULAR; INTRAVENOUS; SUBCUTANEOUS at 02:09

## 2024-09-29 RX ADMIN — ACETAMINOPHEN 650 MG: 325 TABLET ORAL at 09:09

## 2024-09-29 RX ADMIN — HYDROMORPHONE HYDROCHLORIDE 1 MG: 1 INJECTION, SOLUTION INTRAMUSCULAR; INTRAVENOUS; SUBCUTANEOUS at 06:09

## 2024-09-29 RX ADMIN — INSULIN ASPART 4 UNITS: 100 INJECTION, SOLUTION INTRAVENOUS; SUBCUTANEOUS at 12:09

## 2024-09-29 RX ADMIN — HYDROMORPHONE HYDROCHLORIDE 1 MG: 1 INJECTION, SOLUTION INTRAMUSCULAR; INTRAVENOUS; SUBCUTANEOUS at 09:09

## 2024-09-29 RX ADMIN — HYDRALAZINE HYDROCHLORIDE 50 MG: 25 TABLET ORAL at 04:09

## 2024-09-29 RX ADMIN — LORAZEPAM 1 MG: 2 INJECTION INTRAMUSCULAR; INTRAVENOUS at 04:09

## 2024-09-29 RX ADMIN — LORAZEPAM 1 MG: 2 INJECTION INTRAMUSCULAR; INTRAVENOUS at 09:09

## 2024-09-29 RX ADMIN — PROMETHAZINE HYDROCHLORIDE 12.5 MG: 25 INJECTION INTRAMUSCULAR; INTRAVENOUS at 09:09

## 2024-09-29 RX ADMIN — PANTOPRAZOLE SODIUM 40 MG: 40 TABLET, DELAYED RELEASE ORAL at 09:09

## 2024-09-29 RX ADMIN — INSULIN ASPART 2 UNITS: 100 INJECTION, SOLUTION INTRAVENOUS; SUBCUTANEOUS at 04:09

## 2024-09-29 RX ADMIN — FLUOXETINE HYDROCHLORIDE 40 MG: 20 CAPSULE ORAL at 09:09

## 2024-09-29 RX ADMIN — PROMETHAZINE HYDROCHLORIDE 12.5 MG: 25 INJECTION INTRAMUSCULAR; INTRAVENOUS at 07:09

## 2024-09-29 RX ADMIN — SUCRALFATE 1 G: 1 TABLET ORAL at 09:09

## 2024-09-29 RX ADMIN — HYDRALAZINE HYDROCHLORIDE 50 MG: 25 TABLET ORAL at 05:09

## 2024-09-29 NOTE — PROGRESS NOTES
[facility] Medicine  Progress Note    Patient Name: Tabby Howard  MRN: [id number]  Patient Class: IP- Inpatient   Admission Date: 9/19/2024  Length of Stay: 6 days  Attending Physician: Oneyda Hinds MD  Primary Care Provider: Melony Kim NP        Subjective:     Principal Problem:Chronic abdominal pain        HPI:  Tabby Howard is a 34 year old female with a previous medical history of ESRD on dialysis Tue/Thur/Sat, chronic abdominal pain, HTN, Type II Diabetes, who presented to the ED  for left-sided flank pain. HPI is limited to chart review and ED note due to patient not being cooperative.  Per chart review, patient has multiple similar episodes in the past and has had multiple previous CT scans performed.  Also has had multiple hospitalizations for DKA in the past. Patient reports last dialysis was Monday 9/9/24 due to them scheduling her that day. She was unable to attend her normal Thursday session due to severe abdominal pain and vomiting. Of note she has had 5 ER visits over the past five days and most recent admission was on 9/10/24. She was admitted for DKA and insulin drip discontinued the next day. She received dialysis along with an EGD that was negative for any acute process. Initial ED evaluation showed elevated anion gap and betahydroxybutyrate but low normal PH. Dr. Bradford consulted and he placed orders for dialysis. Pain and nausea controlled with ativan and phenergan. Patient admitted by hospital medicine for further evaluation and management.     Overview/Hospital Course:  Patient is a 34 year old patient with diabetes and ESRD on HD, frequent hospital admissions for multiple complains, chronic abdominal pain. Patient was recently discharged few days ago. Patient came back with worsening pain on the left flank, nausea and vomiting. Patient is euglycemic. However beta hydroxybutyrate was elevated. Patient was hydrated. Acidosis improved. The patient  continued to c/o abdominal pain. Prn pain meds were given. The patient developed n/v once again, and prn antiemetics were given.    Interval History:   Seen and examined at multidisciplinary rounds,  patient was very restless, complaining of severe abdominal pain/ lower back pain request IV Dilaudid/ IV Ativan.  Patient has 17 admissions since early of this year due to same problem.     Review of Systems  Objective:     Vital Signs (Most Recent):  Temp: 97.5 °F (36.4 °C) (09/29/24 0704)  Pulse: 94 (09/29/24 0758)  Resp: 20 (09/29/24 0954)  BP: 126/83 (09/29/24 0704)  SpO2: 100 % (09/29/24 0758) Vital Signs (24h Range):  Temp:  [97.4 °F (36.3 °C)-98.2 °F (36.8 °C)] 97.5 °F (36.4 °C)  Pulse:  [] 94  Resp:  [16-20] 20  SpO2:  [99 %-100 %] 100 %  BP: ()/() 126/83     Weight: 49.5 kg (109 lb 2 oz)  Body mass index is 19.96 kg/m².    Intake/Output Summary (Last 24 hours) at 9/29/2024 1034  Last data filed at 9/29/2024 0442  Gross per 24 hour   Intake 600 ml   Output 1500 ml   Net -900 ml         Physical Exam  Vitals reviewed.   Constitutional:       Appearance: She is ill-appearing.      Comments: Chronically ill appearing   HENT:      Head: Normocephalic and atraumatic.      Nose: Nose normal.      Mouth/Throat:      Mouth: Mucous membranes are dry.      Pharynx: Oropharynx is clear.   Eyes:      Conjunctiva/sclera:      Left eye: Hemorrhage present.   Cardiovascular:      Rate and Rhythm: Regular rhythm.      Pulses:           Radial pulses are 2+ on the right side and 2+ on the left side.        Dorsalis pedis pulses are 2+ on the right side and 2+ on the left side.      Arteriovenous access: Left arteriovenous access is present.  Pulmonary:      Effort: Pulmonary effort is normal.      Breath sounds: Normal breath sounds.   Abdominal:      General: Bowel sounds are normal.      Palpations: Abdomen is soft.      Tenderness: There is generalized abdominal tenderness.   Musculoskeletal:          "General: Normal range of motion.      Cervical back: Normal range of motion and neck supple.      Right lower leg: No edema.      Left lower leg: No edema.   Skin:     General: Skin is warm and dry.      Capillary Refill: Capillary refill takes less than 2 seconds.   Neurological:      General: No focal deficit present.      Mental Status: She is alert and oriented to person, place, and time. Mental status is at baseline.   Psychiatric:         Attention and Perception: Attention normal.         Behavior: Behavior is withdrawn       Significant Labs: All pertinent labs within the past 24 hours have been reviewed.  CBC:   No results for input(s): "WBC", "HGB", "HCT", "PLT" in the last 48 hours.    CMP:   Recent Labs   Lab 09/28/24  0503 09/29/24  0400    142   K 4.1 4.3    101   CO2 24 20*   * 201*   BUN 33* 17   CREATININE 7.9* 5.2*   CALCIUM 10.4 10.8*   PROT 7.1  --    ALBUMIN 3.5  --    BILITOT 1.1*  --    ALKPHOS 88  --    AST 16  --    ALT 10  --    ANIONGAP 15 21*       Significant Imaging: I have reviewed all pertinent imaging results/findings within the past 24 hours.  Physical Exam      Scheduled Meds:   apixaban  2.5 mg Oral BID    epoetin teresa-epbx  50 Units/kg Intravenous Every Tues, Thurs, Sat    FLUoxetine  40 mg Oral Daily    hydrALAZINE  50 mg Oral Q8H    insulin glargine U-100 (Lantus)  20 Units Subcutaneous QHS    levETIRAcetam  500 mg Oral BID    pantoprazole  40 mg Oral Daily    prednisoLONE acetate  1 drop Left Eye BID    sevelamer carbonate  800 mg Oral TID WM    sucralfate  1 g Oral QID     Continuous Infusions:  PRN Meds:.  Current Facility-Administered Medications:     acetaminophen, 650 mg, Oral, Q4H PRN    albuterol-ipratropium, 3 mL, Nebulization, Q6H PRN    aluminum-magnesium hydroxide-simethicone, 30 mL, Oral, QID PRN    dextrose 10%, 12.5 g, Intravenous, PRN    dextrose 10%, 25 g, Intravenous, PRN    HYDROcodone-acetaminophen, 1 tablet, Oral, Q4H PRN    " HYDROmorphone, 1 mg, Intravenous, Q4H PRN    insulin aspart U-100, 0-10 Units, Subcutaneous, QID (AC + HS) PRN    lorazepam, 1 mg, Intravenous, Q6H PRN    naloxone, 0.02 mg, Intravenous, PRN    promethazine (PHENERGAN) 12.5 mg in 0.9% NaCl 50 mL IVPB, 12.5 mg, Intravenous, Q6H PRN    simethicone, 1 tablet, Oral, QID PRN    sodium chloride 0.9%, 10 mL, Intravenous, Q12H PRN      X-Ray Chest AP Portable    Result Date: 9/17/2024  EXAMINATION: XR CHEST AP PORTABLE CLINICAL HISTORY: hyperglycemia; TECHNIQUE: Single frontal view of the chest was performed. COMPARISON: Chest x-ray of September 14, 2024 FINDINGS: The lungs are clear, with normal appearance of pulmonary vasculature and no pleural effusion or pneumothorax. The cardiac silhouette is normal in size. The hilar and mediastinal contours are unremarkable. Bones are intact.     No acute abnormality. Electronically signed by: Gianluca Arriaga MD Date:    09/17/2024 Time:    15:07    X-Ray Chest 1 View    Result Date: 9/14/2024  EXAMINATION: XR CHEST 1 VIEW CLINICAL HISTORY: Shortness of breath TECHNIQUE: Single frontal view of the chest was performed. COMPARISON: 08/28/2024 FINDINGS: Enlarged cardiac silhouette.  No focal consolidation or pleural effusion.     As above. Electronically signed by: Jeovany Herrmann Date:    09/14/2024 Time:    22:47    CT Abdomen Pelvis  Without Contrast    Result Date: 9/11/2024  EXAMINATION: CT ABDOMEN PELVIS WITHOUT CONTRAST CLINICAL HISTORY: Abdominal pain, acute, nonlocalized; TECHNIQUE: Low dose axial images, sagittal and coronal reformations were obtained from the lung bases to the pubic symphysis.  Oral contrast was not administered. COMPARISON: Multiple priors, most recent 08/28/2024 FINDINGS: Mild cardiomegaly, unchanged.  Small pericardial effusion measuring simple fluid attenuation, decreased compared to prior.  Basilar airspace opacities improved. Please note in the absence of IV contra valuation for solid organ mass is  limited. Liver is normal in morphology and with smooth contour.  Gallbladder surgically absent.  No intra or extrahepatic biliary ductal dilatation. Atrophic kidneys.  No hydronephrosis. The spleen adrenal glands and pancreas are normal. Gastric wall thickening and edema.  The abdominal aorta is normal in caliber with advanced calcification for patient's age including of the mesenteric and splenic arteries. No enlarged retroperitoneal lymph nodes. Normal appendix.  No bowel obstruction or inflammatory change.  No mid or free air. Circumferential bladder wall thickening.  Uterus and are unremarkable.  No enlarged pelvic lymph. Lower anterior abdominal subcutaneous edema similar prior, possibly from subcutaneous injections. No acute or aggressive osseous abnormality.     Findings concerning for infectious/inflammatory gastritis. Thick-walled bladder as may be seen in the setting of cystitis.  Correlate with urinalysis. Improved pericardial effusion and basilar opacities. Electronically signed by: Tayler Squires Date:    09/11/2024 Time:    11:22  - pulls last radiology orders      Assessment/Plan:      * Chronic abdominal pain  Chronic problem, previous extensive workup including abdominal CT and EGD only shows gastritis.   Patient has long history of gastroparesis.   Patient has 17 hospitalizations since early of this year for chronic abdominal pain, asking for IV Ativan/ IV Dilaudid.   Most likely drug-seeking behavior.   Patient has brittle diabetes as well.   Patient's previous has long QTC, we will repeat EKG, may okay to use IV Reglan if needed.       High anion gap metabolic acidosis  Resolved          ESRD (end stage renal disease) on dialysis  Dialysis as per nephrology      Diabetes  SSI    Seizures  Home meds      Hypertension  Home meds      VTE Risk Mitigation (From admission, onward)           Ordered     apixaban tablet 2.5 mg  2 times daily         09/27/24 1055     IP VTE HIGH RISK PATIENT  Once          09/19/24 1624     Place sequential compression device  Until discontinued         09/19/24 1624                    Discharge Planning   TATIANA:      Code Status: Full Code   Is the patient medically ready for discharge?:     Reason for patient still in hospital (select all that apply): Patient trending condition and Treatment  Discharge Plan A: Home with family                  Oneyda Hinds MD  Department of Hospital Medicine   [facility]/Surg

## 2024-09-29 NOTE — CARE UPDATE
09/28/24 2002   Patient Assessment/Suction   Level of Consciousness (AVPU) alert   Respiratory Effort Unlabored   Expansion/Accessory Muscles/Retractions no use of accessory muscles;no retractions   PRE-TX-O2   Device (Oxygen Therapy) room air   SpO2 99 %   Pulse Oximetry Type Intermittent   $ Pulse Oximetry - Multiple Charge Pulse Oximetry - Multiple   Pulse (!) 112   Aerosol Therapy   $ Aerosol Therapy Charges Refused

## 2024-09-29 NOTE — SUBJECTIVE & OBJECTIVE
Interval History:   Seen and examined at multidisciplinary rounds,  patient was very restless, complaining of severe abdominal pain/ lower back pain request IV Dilaudid/ IV Ativan.  Patient has 17 admissions since early of this year due to same problem.     Review of Systems  Objective:     Vital Signs (Most Recent):  Temp: 97.5 °F (36.4 °C) (09/29/24 0704)  Pulse: 94 (09/29/24 0758)  Resp: 20 (09/29/24 0954)  BP: 126/83 (09/29/24 0704)  SpO2: 100 % (09/29/24 0758) Vital Signs (24h Range):  Temp:  [97.4 °F (36.3 °C)-98.2 °F (36.8 °C)] 97.5 °F (36.4 °C)  Pulse:  [] 94  Resp:  [16-20] 20  SpO2:  [99 %-100 %] 100 %  BP: ()/() 126/83     Weight: 49.5 kg (109 lb 2 oz)  Body mass index is 19.96 kg/m².    Intake/Output Summary (Last 24 hours) at 9/29/2024 1034  Last data filed at 9/29/2024 0442  Gross per 24 hour   Intake 600 ml   Output 1500 ml   Net -900 ml         Physical Exam  Vitals reviewed.   Constitutional:       Appearance: She is ill-appearing.      Comments: Chronically ill appearing   HENT:      Head: Normocephalic and atraumatic.      Nose: Nose normal.      Mouth/Throat:      Mouth: Mucous membranes are dry.      Pharynx: Oropharynx is clear.   Eyes:      Conjunctiva/sclera:      Left eye: Hemorrhage present.   Cardiovascular:      Rate and Rhythm: Regular rhythm.      Pulses:           Radial pulses are 2+ on the right side and 2+ on the left side.        Dorsalis pedis pulses are 2+ on the right side and 2+ on the left side.      Arteriovenous access: Left arteriovenous access is present.  Pulmonary:      Effort: Pulmonary effort is normal.      Breath sounds: Normal breath sounds.   Abdominal:      General: Bowel sounds are normal.      Palpations: Abdomen is soft.      Tenderness: There is generalized abdominal tenderness.   Musculoskeletal:         General: Normal range of motion.      Cervical back: Normal range of motion and neck supple.      Right lower leg: No edema.      Left  "lower leg: No edema.   Skin:     General: Skin is warm and dry.      Capillary Refill: Capillary refill takes less than 2 seconds.   Neurological:      General: No focal deficit present.      Mental Status: She is alert and oriented to person, place, and time. Mental status is at baseline.   Psychiatric:         Attention and Perception: Attention normal.         Behavior: Behavior is withdrawn       Significant Labs: All pertinent labs within the past 24 hours have been reviewed.  CBC:   No results for input(s): "WBC", "HGB", "HCT", "PLT" in the last 48 hours.    CMP:   Recent Labs   Lab 09/28/24  0503 09/29/24  0400    142   K 4.1 4.3    101   CO2 24 20*   * 201*   BUN 33* 17   CREATININE 7.9* 5.2*   CALCIUM 10.4 10.8*   PROT 7.1  --    ALBUMIN 3.5  --    BILITOT 1.1*  --    ALKPHOS 88  --    AST 16  --    ALT 10  --    ANIONGAP 15 21*       Significant Imaging: I have reviewed all pertinent imaging results/findings within the past 24 hours.  Physical Exam      Scheduled Meds:   apixaban  2.5 mg Oral BID    epoetin teresa-epbx  50 Units/kg Intravenous Every Tues, Thurs, Sat    FLUoxetine  40 mg Oral Daily    hydrALAZINE  50 mg Oral Q8H    insulin glargine U-100 (Lantus)  20 Units Subcutaneous QHS    levETIRAcetam  500 mg Oral BID    pantoprazole  40 mg Oral Daily    prednisoLONE acetate  1 drop Left Eye BID    sevelamer carbonate  800 mg Oral TID WM    sucralfate  1 g Oral QID     Continuous Infusions:  PRN Meds:.  Current Facility-Administered Medications:     acetaminophen, 650 mg, Oral, Q4H PRN    albuterol-ipratropium, 3 mL, Nebulization, Q6H PRN    aluminum-magnesium hydroxide-simethicone, 30 mL, Oral, QID PRN    dextrose 10%, 12.5 g, Intravenous, PRN    dextrose 10%, 25 g, Intravenous, PRN    HYDROcodone-acetaminophen, 1 tablet, Oral, Q4H PRN    HYDROmorphone, 1 mg, Intravenous, Q4H PRN    insulin aspart U-100, 0-10 Units, Subcutaneous, QID (AC + HS) PRN    lorazepam, 1 mg, Intravenous, Q6H " PRN    naloxone, 0.02 mg, Intravenous, PRN    promethazine (PHENERGAN) 12.5 mg in 0.9% NaCl 50 mL IVPB, 12.5 mg, Intravenous, Q6H PRN    simethicone, 1 tablet, Oral, QID PRN    sodium chloride 0.9%, 10 mL, Intravenous, Q12H PRN      X-Ray Chest AP Portable    Result Date: 9/17/2024  EXAMINATION: XR CHEST AP PORTABLE CLINICAL HISTORY: hyperglycemia; TECHNIQUE: Single frontal view of the chest was performed. COMPARISON: Chest x-ray of September 14, 2024 FINDINGS: The lungs are clear, with normal appearance of pulmonary vasculature and no pleural effusion or pneumothorax. The cardiac silhouette is normal in size. The hilar and mediastinal contours are unremarkable. Bones are intact.     No acute abnormality. Electronically signed by: Gianluca Arriaga MD Date:    09/17/2024 Time:    15:07    X-Ray Chest 1 View    Result Date: 9/14/2024  EXAMINATION: XR CHEST 1 VIEW CLINICAL HISTORY: Shortness of breath TECHNIQUE: Single frontal view of the chest was performed. COMPARISON: 08/28/2024 FINDINGS: Enlarged cardiac silhouette.  No focal consolidation or pleural effusion.     As above. Electronically signed by: Jeovany Herrmann Date:    09/14/2024 Time:    22:47    CT Abdomen Pelvis  Without Contrast    Result Date: 9/11/2024  EXAMINATION: CT ABDOMEN PELVIS WITHOUT CONTRAST CLINICAL HISTORY: Abdominal pain, acute, nonlocalized; TECHNIQUE: Low dose axial images, sagittal and coronal reformations were obtained from the lung bases to the pubic symphysis.  Oral contrast was not administered. COMPARISON: Multiple priors, most recent 08/28/2024 FINDINGS: Mild cardiomegaly, unchanged.  Small pericardial effusion measuring simple fluid attenuation, decreased compared to prior.  Basilar airspace opacities improved. Please note in the absence of IV contra valuation for solid organ mass is limited. Liver is normal in morphology and with smooth contour.  Gallbladder surgically absent.  No intra or extrahepatic biliary ductal  dilatation. Atrophic kidneys.  No hydronephrosis. The spleen adrenal glands and pancreas are normal. Gastric wall thickening and edema.  The abdominal aorta is normal in caliber with advanced calcification for patient's age including of the mesenteric and splenic arteries. No enlarged retroperitoneal lymph nodes. Normal appendix.  No bowel obstruction or inflammatory change.  No mid or free air. Circumferential bladder wall thickening.  Uterus and are unremarkable.  No enlarged pelvic lymph. Lower anterior abdominal subcutaneous edema similar prior, possibly from subcutaneous injections. No acute or aggressive osseous abnormality.     Findings concerning for infectious/inflammatory gastritis. Thick-walled bladder as may be seen in the setting of cystitis.  Correlate with urinalysis. Improved pericardial effusion and basilar opacities. Electronically signed by: Tayler Squires Date:    09/11/2024 Time:    11:22  - pulls last radiology orders

## 2024-09-29 NOTE — ASSESSMENT & PLAN NOTE
Chronic problem, previous extensive workup including abdominal CT and EGD only shows gastritis.   Patient has long history of gastroparesis.   Patient has 17 hospitalizations since early of this year for chronic abdominal pain, asking for IV Ativan/ IV Dilaudid.   Most likely drug-seeking behavior.   Patient has brittle diabetes as well.   Patient's previous has long QTC, we will repeat EKG, may okay to use IV Reglan if needed.

## 2024-09-29 NOTE — PLAN OF CARE
Problem: Adult Inpatient Plan of Care  Goal: Plan of Care Review  Outcome: Progressing     Problem: Adult Inpatient Plan of Care  Goal: Absence of Hospital-Acquired Illness or Injury  Outcome: Progressing     Problem: Adult Inpatient Plan of Care  Goal: Optimal Comfort and Wellbeing  Outcome: Progressing     Problem: Diabetes Comorbidity  Goal: Blood Glucose Level Within Targeted Range  Outcome: Progressing     Problem: Wound  Goal: Absence of Infection Signs and Symptoms  Outcome: Progressing     Problem: Violence Risk or Actual  Goal: Anger and Impulse Control  Outcome: Progressing     Problem: Pain Acute  Goal: Optimal Pain Control and Function  Outcome: Progressing

## 2024-09-29 NOTE — PROGRESS NOTES
INPATIENT NEPHROLOGY progress  [facility]    Tabby Howard  09/29/2024    Reason for consultation:  esrd    Chief Complaint:   Chief Complaint   Patient presents with    Flank Pain     Left side flank pain, N&V starting this morning     History of Present Illness:    Per H and P  Tabby Howard is a 34 year old female with a previous medical history of ESRD on dialysis Tue/Thur/Sat, chronic abdominal pain, HTN, Type II Diabetes, who presented to the ED  for left-sided flank pain. HPI is limited to chart review and ED note due to patient not being cooperative.  Per chart review, patient has multiple similar episodes in the past and has had multiple previous CT scans performed.  Also has had multiple hospitalizations for DKA in the past. Patient reports last dialysis was Monday 9/9/24 due to them scheduling her that day. She was unable to attend her normal Thursday session due to severe abdominal pain and vomiting. Of note she has had 5 ER visits over the past five days and most recent admission was on 9/10/24. She was admitted for DKA and insulin drip discontinued the next day. She received dialysis along with an EGD that showed mild gastritis. Initial ED evaluation showed elevated anion gap and betahydroxybutyrate but low normal PH.      9/21  pressures low, but stable.  Lab results reviewed.  Seen today on dialysis.  Has hypotension, but alert and oriented.    9/22  pressures actually high now--will resume home hydralazine at low dose.  Lab results reviewed.  Still w/same pain, no other complaints.  0/23 VSS. Plan HD tomorrow or PRN.  9/24 VSS. HD  today. OK to dc home if stable after HD.  9/25 VSS. HD tomorrow. Ongoing symptoms with n/v, pain...  9/26 VSS. HD tomorrow. Still in a lot of pain.  9/28  hypertensive, Patient seen on hemodialysis for uremic clearance and ultrafiltration.    9/29  AFVSS, S/P 1 liter uf yesterday.  No acute events overnight.    Plan of Care:    ESRD on HD  TTS  Hyponatremia  Acidosis  --continue dialysis per routine  --renal dose medication per routine  --pt with 17 hospital admissions in last 12 months    Anemia of CKD  --erythropoiesis stimulating agent with renal replacement therapy    Secondary HPT  --renal diet as tolerated  --continue binders with meals if tolerated    Hypertension  --uf with hd as tolerated  --low salt diet as tolerated  --hydralazine to 50mg po tid on     DM  Gastroparesis   --management per primary team    Thank you for allowing us to participate in this patient's care. We will continue to follow.    Vital Signs:  Temp Readings from Last 3 Encounters:   24 97.5 °F (36.4 °C) (Axillary)   24 98.4 °F (36.9 °C) (Oral)   24 98 °F (36.7 °C)       Pulse Readings from Last 3 Encounters:   24 94   24 94   24 92       BP Readings from Last 3 Encounters:   24 126/83   24 122/75   24 (!) 180/99       Weight:  Wt Readings from Last 3 Encounters:   24 49.5 kg (109 lb 2 oz)   24 52.6 kg (116 lb)   24 52.6 kg (116 lb)       Past Medical & Surgical History:  Past Medical History:   Diagnosis Date    ESRD on hemodialysis 2022    Gastritis 2022    EGD was 21    Gastroparesis 2022    has not had Emptying study    Heart failure with preserved ejection fraction 2022    EF 55% on 3/22    History of supraventricular tachycardia     Hyperkalemia 2022    Hypertensive emergency 2022    Sickle cell trait 2022    Type 2 diabetes mellitus        Past Surgical History:   Procedure Laterality Date     SECTION      x 3    COLONOSCOPY      COLONOSCOPY N/A 2022    Procedure: COLONOSCOPY;  Surgeon: Jagdeep Cedeno MD;  Location: [facility];  Service: Endoscopy;  Laterality: N/A;    DESTRUCTION, CILIARY BODY, USING LASER Left 2024    Procedure: Cyclophotocoagulation G-probe, retrobulbar chlorpromazine left eye;  Surgeon: Fidel Wise MD;   [address];  Service: Ophthalmology;  Laterality: Left;    ENDOSCOPIC ULTRASOUND OF UPPER GASTROINTESTINAL TRACT N/A 12/16/2023    Procedure: ULTRASOUND, UPPER GI TRACT, ENDOSCOPIC;  Surgeon: Micky Paredes III, MD;  [facility]: [facility];  Service: Endoscopy;  Laterality: N/A;    ESOPHAGOGASTRODUODENOSCOPY N/A 10/18/2019    Procedure: ESOPHAGOGASTRODUODENOSCOPY (EGD);  Surgeon: Gianluca Mendez MD;  Location: [facility];  Service: Endoscopy;  Laterality: N/A;    ESOPHAGOGASTRODUODENOSCOPY N/A 08/24/2022    Procedure: EGD (ESOPHAGOGASTRODUODENOSCOPY);  Surgeon: Micky Paredes III, MD;  [facility];  Service: Endoscopy;  Laterality: N/A;    ESOPHAGOGASTRODUODENOSCOPY N/A 12/05/2022    Procedure: EGD (ESOPHAGOGASTRODUODENOSCOPY);  Surgeon: Marcelo Zhong MD;  [facility];  Service: Endoscopy;  Laterality: N/A;    ESOPHAGOGASTRODUODENOSCOPY N/A 9/13/2024    Procedure: EGD (ESOPHAGOGASTRODUODENOSCOPY);  Surgeon: Marcelo Zhong MD;  Location: [facility];  Service: Endoscopy;  Laterality: N/A;    EYE SURGERY      INSERTION, CATHETER, TUNNELED N/A 06/17/2023    Procedure: Insertion,catheter,tunneled;  Surgeon: Carlos Thurman Jr., MD;  Location: [facility];  Service: General;  Laterality: N/A;    LAPAROSCOPIC CHOLECYSTECTOMY N/A 07/30/2022    Procedure: CHOLECYSTECTOMY, LAPAROSCOPIC;  Surgeon: Grey Perez MD;  Location: [facility];  Service: General;  Laterality: N/A;    PLACEMENT OF DUAL-LUMEN VASCULAR CATHETER Left 07/12/2022    Procedure: INSERTION-CATHETER-JOSEPH;  Surgeon: Dionte Gan MD;  Location: [facility];  Service: General;  Laterality: Left;    PLACEMENT OF DUAL-LUMEN VASCULAR CATHETER Right 07/26/2022    Procedure: INSERTION-CATHETER-Hemosplit;  Surgeon: Dionte Gan MD;  Location: [facility] OR;  Service: General;  Laterality: Right;       Past Social History:  Social History     Socioeconomic History    Marital status:    Tobacco Use    Smoking status:  Never    Smokeless tobacco: Never   Substance and Sexual Activity    Alcohol use: Not Currently    Drug use: No    Sexual activity: Not Currently     Partners: Male     Birth control/protection: I.U.D.     Social Drivers of Health     Financial Resource Strain: Low Risk  (8/29/2024)    Overall Financial Resource Strain (CARDIA)     Difficulty of Paying Living Expenses: Not hard at all   Recent Concern: Financial Resource Strain - Medium Risk (8/25/2024)    Overall Financial Resource Strain (CARDIA)     Difficulty of Paying Living Expenses: Somewhat hard   Food Insecurity: No Food Insecurity (8/29/2024)    Hunger Vital Sign     Worried About Running Out of Food in the Last Year: Never true     Ran Out of Food in the Last Year: Never true   Recent Concern: Food Insecurity - Food Insecurity Present (8/25/2024)    Hunger Vital Sign     Worried About Running Out of Food in the Last Year: Often true     Ran Out of Food in the Last Year: Often true   Transportation Needs: No Transportation Needs (8/29/2024)    TRANSPORTATION NEEDS     Transportation : No   Physical Activity: Sufficiently Active (8/29/2024)    Exercise Vital Sign     Days of Exercise per Week: 5 days     Minutes of Exercise per Session: 30 min   Recent Concern: Physical Activity - Inactive (8/25/2024)    Exercise Vital Sign     Days of Exercise per Week: 0 days     Minutes of Exercise per Session: 0 min   Stress: No Stress Concern Present (8/29/2024)    [employer] - Occupational Stress Questionnaire     Feeling of Stress : Not at all   Recent Concern: Stress - Stress Concern Present (8/25/2024)    [employer] - Occupational Stress Questionnaire     Feeling of Stress : Rather much   Housing Stability: Low Risk  (8/29/2024)    Housing Stability Vital Sign     Unable to Pay for Housing in the Last Year: No     Homeless in the Last Year: No   Recent Concern: Housing Stability - High Risk (8/25/2024)     Housing Stability Vital Sign     Unable to Pay for Housing in the Last Year: Yes     Homeless in the Last Year: No       Medications:  No current facility-administered medications on file prior to encounter.     Current Outpatient Medications on File Prior to Encounter   Medication Sig Dispense Refill    albuterol (VENTOLIN HFA) 90 mcg/actuation inhaler Inhale 2 puffs every 4 hours by inhalation route. 8 g 2    apixaban (ELIQUIS) 5 mg Tab Take 1 tablet (5 mg total) by mouth 2 (two) times daily. Patient w/ thrombocytopenia <50, so held during admission, follow-up with hematologist about restarting 180 tablet 1    brinzolamide (AZOPT) 1 % ophthalmic suspension Place1 drop in left eye 3 times a day for 30 days 15 mL 6    cetirizine (ZYRTEC) 10 MG tablet Take 1 tablet every day by oral route. 30 tablet 0    FLUoxetine 40 MG capsule Take 1 capsule every day by oral route. 30 capsule 2    fluticasone propionate (FLONASE ALLERGY RELIEF) 50 mcg/actuation nasal spray [place] 1 spray every day by intranasal route. 16 g 2    gabapentin (NEURONTIN) 300 MG capsule Take 2 capsules twice a day by oral route for 90 days. 360 capsule 1    insulin aspart U-100 (NOVOLOG) 100 unit/mL (3 mL) InPn pen Inject 8 Units into the skin 3 (three) times daily with meals. 15 mL 0    insulin glargine U-100, Lantus, (LANTUS SOLOSTAR U-100 INSULIN) 100 unit/mL (3 mL) InPn pen Inject 22 units every day by subcutaneous route in the evening for 30 days, for diabetes. 15 mL 2    levETIRAcetam (KEPPRA) 500 MG Tab Take 1 tablet (500 mg total) by mouth 2 (two) times daily. 60 tablet 11    ondansetron (ZOFRAN-ODT) 4 MG TbDL Place 1 tablet (4 mg) on or under the tongue every 8 hours for 10 days. 24 tablet 2    pantoprazole (PROTONIX) 40 MG tablet Take 1 tablet (40 mg total) by mouth once daily. 30 tablet 0    prednisoLONE acetate (PRED FORTE) 1 % DrpS Place 1 drop into the left eye 2 (two) times daily. 5 mL 3    promethazine (PHENERGAN) 25 MG tablet Take 1  "tablet (25 mg total) by mouth every 6 (six) hours as needed. 15 tablet 0    sevelamer carbonate (RENVELA) 800 mg Tab Take 1 tablet (800 mg total) by mouth 3 (three) times daily with meals. 180 tablet 11    sucralfate (CARAFATE) 1 gram tablet Take 1 tablet (1 g total) by mouth 4 (four) times daily. 100 tablet 1    atropine 1% (ISOPTO ATROPINE) 1 % Drop Place 1 drop into the left eye 2 (two) times a day. 15 mL 3    blood sugar diagnostic (TRUE METRIX GLUCOSE TEST STRIP) Strp Use 1 strip to check blood glucose three times daily 100 each 11    blood-glucose meter (TRUE METRIX GLUCOSE METER) Misc Use to check blood glucose 1 each 0    blood-glucose meter,continuous Misc USE WITH SENSORS 1 each 0    blood-glucose sensor (DEXCOM G7 SENSOR) Heather Use as directed for CGM. Change sensor every 10 days 3 each 0    blood-glucose sensor Heather CHANGE EVERY 10 DAYS 3 each 0    brimonidine 0.15 % OPTH DROP (ALPHAGAN) 0.15 % ophthalmic solution Place 1 drop into both eyes 3 (three) times daily. 15 mL 3    busPIRone (BUSPAR) 10 MG tablet Take 1 tablet (10 mg total) by mouth 3 (three) times daily as needed (anxiety). 60 tablet 5    dorzolamide-timolol 2-0.5% (COSOPT) 22.3-6.8 mg/mL ophthalmic solution place 1 drop in left eye twice a day  for 30 days 10 mL 0    lancets (TRUEPLUS LANCETS) 33 gauge Misc Use 1 to check blood glucose three times daily 100 each 11    pen needle, diabetic 31 gauge x 3/16" Ndle Use as directed with insulin once daily 100 each 0    timolol maleate 0.5% (TIMOPTIC) 0.5 % Drop Place 1 drop in left eye 2 times a day for 30 days 5 mL 6    [DISCONTINUED] atenoloL (TENORMIN) 50 MG tablet Take 1 tablet (50 mg total) by mouth every other day. 45 tablet 3    [DISCONTINUED] insulin detemir U-100, Levemir, (LEVEMIR FLEXTOUCH U100 INSULIN) 100 unit/mL (3 mL) InPn pen Inject 22 units every day by subcutaneous route in the evening for 90 days. 18 mL 1    [DISCONTINUED] omeprazole (PRILOSEC) 20 MG capsule Take 2 capsules (40 mg " "total) by mouth once daily. for 10 days 20 capsule 0     Scheduled Meds:   apixaban  2.5 mg Oral BID    epoetin teresa-epbx  50 Units/kg Intravenous Every Tues, Thurs, Sat    FLUoxetine  40 mg Oral Daily    hydrALAZINE  50 mg Oral Q8H    insulin glargine U-100 (Lantus)  20 Units Subcutaneous QHS    levETIRAcetam  500 mg Oral BID    pantoprazole  40 mg Oral Daily    prednisoLONE acetate  1 drop Left Eye BID    sevelamer carbonate  800 mg Oral TID WM    sucralfate  1 g Oral QID     Continuous Infusions:  PRN Meds:.  Current Facility-Administered Medications:     acetaminophen, 650 mg, Oral, Q4H PRN    albuterol-ipratropium, 3 mL, Nebulization, Q6H PRN    aluminum-magnesium hydroxide-simethicone, 30 mL, Oral, QID PRN    dextrose 10%, 12.5 g, Intravenous, PRN    dextrose 10%, 25 g, Intravenous, PRN    HYDROcodone-acetaminophen, 1 tablet, Oral, Q4H PRN    HYDROmorphone, 1 mg, Intravenous, Q4H PRN    insulin aspart U-100, 0-10 Units, Subcutaneous, QID (AC + HS) PRN    lorazepam, 1 mg, Intravenous, Q6H PRN    naloxone, 0.02 mg, Intravenous, PRN    promethazine (PHENERGAN) 12.5 mg in 0.9% NaCl 50 mL IVPB, 12.5 mg, Intravenous, Q6H PRN    simethicone, 1 tablet, Oral, QID PRN    sodium chloride 0.9%, 10 mL, Intravenous, Q12H PRN    Allergies:  Droperidol, Haldol [haloperidol lactate], Penicillins, and Droperidol (bulk)    Past Family History:  Reviewed; refer to Hospitalist Admission Note    Review of Systems:  Review of Systems - All 14 systems reviewed and negative, except as noted in HPI    Physical Exam:    /83 (BP Location: Right arm, Patient Position: Lying)   Pulse 94   Temp 97.5 °F (36.4 °C) (Axillary)   Resp 20   Ht 5' 2" (1.575 m)   Wt 49.5 kg (109 lb 2 oz)   LMP  (LMP Unknown) Comment: pt states last mentral cycle was 3yrs ago  SpO2 100%   Breastfeeding No   BMI 19.96 kg/m²     General Appearance:    Alert, cooperative, no distress, appears stated age   Head:    Normocephalic, without obvious " "abnormality, atraumatic   Eyes:    PER, conjunctiva/corneas clear, EOM's intact in both eyes        Throat:   Lips, mucosa, and tongue normal; teeth and gums normal   Back:     Symmetric, no curvature, ROM normal, no CVA tenderness   Lungs:     Clear to auscultation bilaterally, respirations unlabored   Chest wall:    No tenderness or deformity   Heart:    Regular rate and rhythm, S1 and S2 normal, no murmur, rub   or gallop   Abdomen:     Soft, non-tender, bowel sounds active all four quadrants,     no masses, no organomegaly   Extremities:   Extremities normal, atraumatic, no cyanosis or edema   Pulses:   2+ and symmetric all extremities   MSK:   No joint or muscle swelling, tenderness or deformity   Skin:   Skin color, texture, turgor normal, no rashes or lesions   Neurologic:   CNII-XII intact, normal strength and sensation       Throughout.  No flap     Results:  Lab Results   Component Value Date     09/29/2024    K 4.3 09/29/2024     09/29/2024    CO2 20 (L) 09/29/2024    BUN 17 09/29/2024    CREATININE 5.2 (H) 09/29/2024    CALCIUM 10.8 (H) 09/29/2024    ANIONGAP 21 (H) 09/29/2024    ESTGFRAFRICA 19 (L) 09/03/2022    EGFRNONAA 18 (A) 07/31/2022       Lab Results   Component Value Date    CALCIUM 10.8 (H) 09/29/2024    PHOS 2.5 (L) 09/22/2024       No results for input(s): "WBC", "RBC", "HGB", "HCT", "PLT", "MCV", "MCH", "MCHC" in the last 24 hours.    Hugh Figueroa MD    [facility]  [phone]     "

## 2024-09-30 VITALS
DIASTOLIC BLOOD PRESSURE: 95 MMHG | HEART RATE: 97 BPM | WEIGHT: 108 LBS | BODY MASS INDEX: 19.88 KG/M2 | HEIGHT: 62 IN | TEMPERATURE: 99 F | SYSTOLIC BLOOD PRESSURE: 161 MMHG | OXYGEN SATURATION: 99 % | RESPIRATION RATE: 18 BRPM

## 2024-09-30 LAB
ANION GAP SERPL CALC-SCNC: 20 MMOL/L (ref 8–16)
BASOPHILS # BLD AUTO: 0.05 K/UL (ref 0–0.2)
BASOPHILS NFR BLD: 0.9 % (ref 0–1.9)
BUN SERPL-MCNC: 29 MG/DL (ref 6–20)
CALCIUM SERPL-MCNC: 10.5 MG/DL (ref 8.7–10.5)
CHLORIDE SERPL-SCNC: 102 MMOL/L (ref 95–110)
CO2 SERPL-SCNC: 19 MMOL/L (ref 23–29)
CREAT SERPL-MCNC: 7.5 MG/DL (ref 0.5–1.4)
DIFFERENTIAL METHOD BLD: ABNORMAL
EOSINOPHIL # BLD AUTO: 0 K/UL (ref 0–0.5)
EOSINOPHIL NFR BLD: 0.7 % (ref 0–8)
ERYTHROCYTE [DISTWIDTH] IN BLOOD BY AUTOMATED COUNT: 16.8 % (ref 11.5–14.5)
EST. GFR  (NO RACE VARIABLE): 7 ML/MIN/1.73 M^2
GLUCOSE SERPL-MCNC: 256 MG/DL (ref 70–110)
HCT VFR BLD AUTO: 26.8 % (ref 37–48.5)
HGB BLD-MCNC: 9 G/DL (ref 12–16)
IMM GRANULOCYTES # BLD AUTO: 0.02 K/UL (ref 0–0.04)
IMM GRANULOCYTES NFR BLD AUTO: 0.4 % (ref 0–0.5)
LYMPHOCYTES # BLD AUTO: 0.9 K/UL (ref 1–4.8)
LYMPHOCYTES NFR BLD: 15.1 % (ref 18–48)
MCH RBC QN AUTO: 28.4 PG (ref 27–31)
MCHC RBC AUTO-ENTMCNC: 33.6 G/DL (ref 32–36)
MCV RBC AUTO: 85 FL (ref 82–98)
MONOCYTES # BLD AUTO: 0.4 K/UL (ref 0.3–1)
MONOCYTES NFR BLD: 7.5 % (ref 4–15)
NEUTROPHILS # BLD AUTO: 4.2 K/UL (ref 1.8–7.7)
NEUTROPHILS NFR BLD: 75.4 % (ref 38–73)
NRBC BLD-RTO: 0 /100 WBC
PLATELET # BLD AUTO: 191 K/UL (ref 150–450)
PMV BLD AUTO: 10 FL (ref 9.2–12.9)
POCT GLUCOSE: 237 MG/DL (ref 70–110)
POTASSIUM SERPL-SCNC: 4.3 MMOL/L (ref 3.5–5.1)
RBC # BLD AUTO: 3.17 M/UL (ref 4–5.4)
SODIUM SERPL-SCNC: 141 MMOL/L (ref 136–145)
WBC # BLD AUTO: 5.62 K/UL (ref 3.9–12.7)

## 2024-09-30 PROCEDURE — 94761 N-INVAS EAR/PLS OXIMETRY MLT: CPT

## 2024-09-30 PROCEDURE — 80048 BASIC METABOLIC PNL TOTAL CA: CPT | Performed by: HOSPITALIST

## 2024-09-30 PROCEDURE — 94760 N-INVAS EAR/PLS OXIMETRY 1: CPT

## 2024-09-30 PROCEDURE — 25000003 PHARM REV CODE 250

## 2024-09-30 PROCEDURE — 99900035 HC TECH TIME PER 15 MIN (STAT)

## 2024-09-30 PROCEDURE — 63600175 PHARM REV CODE 636 W HCPCS: Performed by: INTERNAL MEDICINE

## 2024-09-30 PROCEDURE — 36415 COLL VENOUS BLD VENIPUNCTURE: CPT | Performed by: HOSPITALIST

## 2024-09-30 PROCEDURE — 25000003 PHARM REV CODE 250: Performed by: INTERNAL MEDICINE

## 2024-09-30 PROCEDURE — 25000003 PHARM REV CODE 250: Performed by: HOSPITALIST

## 2024-09-30 PROCEDURE — 85025 COMPLETE CBC W/AUTO DIFF WBC: CPT | Performed by: HOSPITALIST

## 2024-09-30 PROCEDURE — 63600175 PHARM REV CODE 636 W HCPCS

## 2024-09-30 RX ORDER — SODIUM BICARBONATE 650 MG/1
650 TABLET ORAL 3 TIMES DAILY
Status: DISCONTINUED | OUTPATIENT
Start: 2024-09-30 | End: 2024-09-30 | Stop reason: HOSPADM

## 2024-09-30 RX ORDER — SODIUM BICARBONATE 650 MG/1
650 TABLET ORAL 3 TIMES DAILY
Qty: 270 TABLET | Refills: 3 | Status: SHIPPED | OUTPATIENT
Start: 2024-09-30 | End: 2025-09-30

## 2024-09-30 RX ORDER — HYDRALAZINE HYDROCHLORIDE 50 MG/1
50 TABLET, FILM COATED ORAL EVERY 8 HOURS
Start: 2024-09-30 | End: 2025-09-30

## 2024-09-30 RX ADMIN — HYDRALAZINE HYDROCHLORIDE 50 MG: 25 TABLET ORAL at 05:09

## 2024-09-30 RX ADMIN — APIXABAN 2.5 MG: 2.5 TABLET, FILM COATED ORAL at 09:09

## 2024-09-30 RX ADMIN — FLUOXETINE HYDROCHLORIDE 40 MG: 20 CAPSULE ORAL at 09:09

## 2024-09-30 RX ADMIN — SUCRALFATE 1 G: 1 TABLET ORAL at 09:09

## 2024-09-30 RX ADMIN — PANTOPRAZOLE SODIUM 40 MG: 40 TABLET, DELAYED RELEASE ORAL at 09:09

## 2024-09-30 RX ADMIN — HYDROMORPHONE HYDROCHLORIDE 1 MG: 1 INJECTION, SOLUTION INTRAMUSCULAR; INTRAVENOUS; SUBCUTANEOUS at 07:09

## 2024-09-30 RX ADMIN — LORAZEPAM 1 MG: 2 INJECTION INTRAMUSCULAR; INTRAVENOUS at 12:09

## 2024-09-30 RX ADMIN — LORAZEPAM 1 MG: 2 INJECTION INTRAMUSCULAR; INTRAVENOUS at 11:09

## 2024-09-30 RX ADMIN — HYDROMORPHONE HYDROCHLORIDE 1 MG: 1 INJECTION, SOLUTION INTRAMUSCULAR; INTRAVENOUS; SUBCUTANEOUS at 03:09

## 2024-09-30 RX ADMIN — SEVELAMER CARBONATE 800 MG: 800 TABLET, FILM COATED ORAL at 09:09

## 2024-09-30 RX ADMIN — SODIUM BICARBONATE 650 MG TABLET 650 MG: at 09:09

## 2024-09-30 RX ADMIN — LEVETIRACETAM 500 MG: 250 TABLET, FILM COATED ORAL at 09:09

## 2024-09-30 RX ADMIN — PREDNISOLONE ACETATE 1 DROP: 10 SUSPENSION/ DROPS OPHTHALMIC at 09:09

## 2024-09-30 NOTE — NURSING
IV and tele removed. Orders reviewed. Prescriptions sent to pharmacy. Pt ride here. Pt escorted via w/c to car and d/c home

## 2024-09-30 NOTE — PLAN OF CARE
Pt clear for DC from CM standpoint. Discharging home.     Email has been sent to [facility] for PCP f/u.    Spoke to pt's stepmother, Tanya, @ [phone] to update her on DC, she states she will be available to  pt.        09/30/24 1051   Final Note   Assessment Type Final Discharge Note   Anticipated Discharge Disposition Home   Hospital Resources/Appts/Education Provided Provided patient/caregiver with written discharge plan information

## 2024-09-30 NOTE — CARE UPDATE
09/30/24 0953   Patient Assessment/Suction   Level of Consciousness (AVPU) alert   Respiratory Effort Unlabored   All Lung Fields Breath Sounds clear   PRE-TX-O2   Device (Oxygen Therapy) room air   SpO2 100 %   Pulse Oximetry Type Intermittent   $ Pulse Oximetry - Multiple Charge Pulse Oximetry - Multiple   Aerosol Therapy   $ Aerosol Therapy Charges PRN treatment not required

## 2024-09-30 NOTE — NURSING
"Pt repeatedly calling and requesting the nurse to come in her room. Upon entering the room, pt requested for IV pain medicine. Explained the pt that she got her IV pain med an hour ago and not due for next few hours, offered PO med but pt refused PO pain med and states that "It doesn't work for me, I need IV".  "

## 2024-09-30 NOTE — PLAN OF CARE
Problem: Adult Inpatient Plan of Care  Goal: Plan of Care Review  Outcome: Progressing     Problem: Adult Inpatient Plan of Care  Goal: Absence of Hospital-Acquired Illness or Injury  Outcome: Progressing     Problem: Adult Inpatient Plan of Care  Goal: Optimal Comfort and Wellbeing  Outcome: Progressing     Problem: Diabetes Comorbidity  Goal: Blood Glucose Level Within Targeted Range  Outcome: Progressing     Problem: Wound  Goal: Absence of Infection Signs and Symptoms  Outcome: Progressing     Problem: Wound  Goal: Optimal Pain Control and Function  Outcome: Progressing     Problem: Violence Risk or Actual  Goal: Anger and Impulse Control  Outcome: Progressing

## 2024-09-30 NOTE — NURSING
"This patient is screaming "nurse" multiple times. Upon entering the room, patient observed sitting straight up in bed, rocking side and side, while holding her gown up covering her chest. When asked "What can we do to make her more comfortable?"  This patient verbalizes in a loud, harsh tone with incomprehensible words/phrases. Encouraged to take a deep breath to verbalize her requests. Stops rocking and states "I need my pain medicine." Reviewed prn pain medications and antianxiety medications given as well as the timing of the medications. Reviewed with this patient that she has a PO pain medication but continues to refuse any PRN PO medication. "I had a headache and she gave me tylenol." Was given po tylenol for c/o headache per order.  Reviewed again with this patient that the next PRN IV pain medication will not be due until 1100 pm. Verbalizes understanding. Returns to laying position in bed. Respirations even and non-labored. No distress noted.   "

## 2024-09-30 NOTE — NURSING
"Went to speak to patient at request of Kimberly RN. Upon entering the room this patient is observed thrashing side to side in bed. I introduced myself and offer to talk with her. This patient stated "No". So I granted her wish. Charge nurse aware.    "

## 2024-09-30 NOTE — NURSING
"Bed alarm went off. Met with this patient at the door dressed in gown without non-skid socks. Directed back to bed. Requesting "ice and some pain".  Notified her staff nurse is in the med room now collecting her pain medication. Cup of ice at bedside.  "

## 2024-09-30 NOTE — CARE UPDATE
09/29/24 1958   Patient Assessment/Suction   Level of Consciousness (AVPU) alert   Respiratory Effort Unlabored   Expansion/Accessory Muscles/Retractions no retractions;no use of accessory muscles   PRE-TX-O2   Device (Oxygen Therapy) room air   SpO2 100 %   Pulse Oximetry Type Intermittent   $ Pulse Oximetry - Multiple Charge Pulse Oximetry - Multiple   Pulse 101   Resp (!) 21   Aerosol Therapy   $ Aerosol Therapy Charges Refused

## 2024-10-05 LAB
OHS QRS DURATION: 82 MS
OHS QRS DURATION: 90 MS
OHS QTC CALCULATION: 485 MS
OHS QTC CALCULATION: 507 MS

## 2024-10-19 ENCOUNTER — HOSPITAL ENCOUNTER (EMERGENCY)
Facility: HOSPITAL | Age: 34
Discharge: HOME | End: 2024-10-19
Attending: STUDENT IN AN ORGANIZED HEALTH CARE EDUCATION/TRAINING PROGRAM
Payer: MEDICAID

## 2024-10-19 VITALS
OXYGEN SATURATION: 100 % | BODY MASS INDEX: 18.77 KG/M2 | TEMPERATURE: 98 F | RESPIRATION RATE: 20 BRPM | HEART RATE: 87 BPM | DIASTOLIC BLOOD PRESSURE: 81 MMHG | WEIGHT: 102 LBS | HEIGHT: 62 IN | SYSTOLIC BLOOD PRESSURE: 133 MMHG

## 2024-10-19 DIAGNOSIS — R10.12 LEFT UPPER QUADRANT ABDOMINAL PAIN: ICD-10-CM

## 2024-10-19 DIAGNOSIS — N18.6 ESRD (END STAGE RENAL DISEASE) ON DIALYSIS: ICD-10-CM

## 2024-10-19 DIAGNOSIS — E16.2 HYPOGLYCEMIA: Primary | ICD-10-CM

## 2024-10-19 DIAGNOSIS — R11.0 NAUSEA: ICD-10-CM

## 2024-10-19 DIAGNOSIS — Z99.2 ESRD (END STAGE RENAL DISEASE) ON DIALYSIS: ICD-10-CM

## 2024-10-19 LAB
ALBUMIN SERPL BCP-MCNC: 3.2 G/DL (ref 3.5–5.2)
ALP SERPL-CCNC: 113 U/L (ref 40–150)
ALT SERPL W/O P-5'-P-CCNC: 18 U/L (ref 10–44)
ANION GAP SERPL CALC-SCNC: 14 MMOL/L (ref 8–16)
AST SERPL-CCNC: 15 U/L (ref 10–40)
B-OH-BUTYR BLD STRIP-SCNC: 0.1 MMOL/L (ref 0–0.5)
BASOPHILS # BLD AUTO: 0.01 K/UL (ref 0–0.2)
BASOPHILS NFR BLD: 0.2 % (ref 0–1.9)
BILIRUB SERPL-MCNC: 0.6 MG/DL (ref 0.1–1)
BUN SERPL-MCNC: 15 MG/DL (ref 6–20)
CALCIUM SERPL-MCNC: 9.1 MG/DL (ref 8.7–10.5)
CHLORIDE SERPL-SCNC: 97 MMOL/L (ref 95–110)
CO2 SERPL-SCNC: 29 MMOL/L (ref 23–29)
CREAT SERPL-MCNC: 2.6 MG/DL (ref 0.5–1.4)
DIFFERENTIAL METHOD BLD: ABNORMAL
EOSINOPHIL # BLD AUTO: 0.1 K/UL (ref 0–0.5)
EOSINOPHIL NFR BLD: 1.6 % (ref 0–8)
ERYTHROCYTE [DISTWIDTH] IN BLOOD BY AUTOMATED COUNT: 17.5 % (ref 11.5–14.5)
EST. GFR  (NO RACE VARIABLE): 24 ML/MIN/1.73 M^2
GLUCOSE SERPL-MCNC: 62 MG/DL (ref 70–110)
HCT VFR BLD AUTO: 24.9 % (ref 37–48.5)
HGB BLD-MCNC: 8 G/DL (ref 12–16)
IMM GRANULOCYTES # BLD AUTO: 0.01 K/UL (ref 0–0.04)
IMM GRANULOCYTES NFR BLD AUTO: 0.2 % (ref 0–0.5)
LYMPHOCYTES # BLD AUTO: 0.5 K/UL (ref 1–4.8)
LYMPHOCYTES NFR BLD: 11 % (ref 18–48)
MAGNESIUM SERPL-MCNC: 1.8 MG/DL (ref 1.6–2.6)
MCH RBC QN AUTO: 29 PG (ref 27–31)
MCHC RBC AUTO-ENTMCNC: 32.1 G/DL (ref 32–36)
MCV RBC AUTO: 90 FL (ref 82–98)
MONOCYTES # BLD AUTO: 0.5 K/UL (ref 0.3–1)
MONOCYTES NFR BLD: 10.8 % (ref 4–15)
NEUTROPHILS # BLD AUTO: 3.7 K/UL (ref 1.8–7.7)
NEUTROPHILS NFR BLD: 76.2 % (ref 38–73)
NRBC BLD-RTO: 0 /100 WBC
PHOSPHATE SERPL-MCNC: 2.5 MG/DL (ref 2.7–4.5)
PLATELET # BLD AUTO: 156 K/UL (ref 150–450)
PMV BLD AUTO: 10.2 FL (ref 9.2–12.9)
POCT GLUCOSE: 184 MG/DL (ref 70–110)
POCT GLUCOSE: 51 MG/DL (ref 70–110)
POCT GLUCOSE: 63 MG/DL (ref 70–110)
POTASSIUM SERPL-SCNC: 3.4 MMOL/L (ref 3.5–5.1)
PROT SERPL-MCNC: 7 G/DL (ref 6–8.4)
RBC # BLD AUTO: 2.76 M/UL (ref 4–5.4)
SODIUM SERPL-SCNC: 140 MMOL/L (ref 136–145)
WBC # BLD AUTO: 4.9 K/UL (ref 3.9–12.7)

## 2024-10-19 PROCEDURE — 84100 ASSAY OF PHOSPHORUS: CPT | Performed by: PHYSICIAN ASSISTANT

## 2024-10-19 PROCEDURE — 96365 THER/PROPH/DIAG IV INF INIT: CPT

## 2024-10-19 PROCEDURE — 80053 COMPREHEN METABOLIC PANEL: CPT | Performed by: PHYSICIAN ASSISTANT

## 2024-10-19 PROCEDURE — 25000003 PHARM REV CODE 250: Performed by: PHYSICIAN ASSISTANT

## 2024-10-19 PROCEDURE — 83735 ASSAY OF MAGNESIUM: CPT | Performed by: PHYSICIAN ASSISTANT

## 2024-10-19 PROCEDURE — 85025 COMPLETE CBC W/AUTO DIFF WBC: CPT | Performed by: PHYSICIAN ASSISTANT

## 2024-10-19 PROCEDURE — 82010 KETONE BODYS QUAN: CPT | Performed by: PHYSICIAN ASSISTANT

## 2024-10-19 PROCEDURE — 99285 EMERGENCY DEPT VISIT HI MDM: CPT | Mod: 25

## 2024-10-19 PROCEDURE — 82962 GLUCOSE BLOOD TEST: CPT

## 2024-10-19 PROCEDURE — 96375 TX/PRO/DX INJ NEW DRUG ADDON: CPT

## 2024-10-19 PROCEDURE — 63600175 PHARM REV CODE 636 W HCPCS: Performed by: PHYSICIAN ASSISTANT

## 2024-10-19 PROCEDURE — 36415 COLL VENOUS BLD VENIPUNCTURE: CPT | Performed by: PHYSICIAN ASSISTANT

## 2024-10-19 RX ORDER — ACETAMINOPHEN 500 MG
1000 TABLET ORAL
Status: DISCONTINUED | OUTPATIENT
Start: 2024-10-19 | End: 2024-10-19 | Stop reason: HOSPADM

## 2024-10-19 RX ORDER — DEXTROSE 50 % IN WATER (D50W) INTRAVENOUS SYRINGE
25
Status: COMPLETED | OUTPATIENT
Start: 2024-10-19 | End: 2024-10-19

## 2024-10-19 RX ORDER — MORPHINE SULFATE 4 MG/ML
4 INJECTION, SOLUTION INTRAMUSCULAR; INTRAVENOUS
Status: COMPLETED | OUTPATIENT
Start: 2024-10-19 | End: 2024-10-19

## 2024-10-19 RX ORDER — OXYCODONE HYDROCHLORIDE 5 MG/1
5 TABLET ORAL
Status: DISCONTINUED | OUTPATIENT
Start: 2024-10-19 | End: 2024-10-19 | Stop reason: HOSPADM

## 2024-10-19 RX ADMIN — DEXTROSE MONOHYDRATE 25 G: 25 INJECTION, SOLUTION INTRAVENOUS at 07:10

## 2024-10-19 RX ADMIN — MORPHINE SULFATE 4 MG: 4 INJECTION INTRAVENOUS at 05:10

## 2024-10-19 RX ADMIN — DEXTROSE MONOHYDRATE 25 G: 25 INJECTION, SOLUTION INTRAVENOUS at 04:10

## 2024-10-19 RX ADMIN — PROMETHAZINE HYDROCHLORIDE 25 MG: 25 INJECTION INTRAMUSCULAR; INTRAVENOUS at 05:10

## 2024-10-19 NOTE — ED NOTES
"Assumed care:  Tabby Howard is awake, alert and oriented x 3, skin warm and dry, in NAD.  Patient with history of IDDM states glucose was in the 40s at home.  Patient states she took her insulin yesterday around 5pm.  States feels better from that but now with left lower back pain.    Patient identifiers for Tabby Howard checked and correct.  LOC:  Tabby Howard is awake, alert, and aware of environment with an appropriate affect. She is oriented x 3 and speaking appropriately.  APPEARANCE:  She is resting comfortably and in no acute distress. She is clean and well groomed, patient's clothing is properly fastened.  SKIN:  The skin is warm and dry. She has normal skin turgor and moist mucus membranes. Skin is intact; no bruising or breakdown noted.  MUSCULOSKELETAL:  She is moving all extremities well, no obvious deformities noted. Pulses intact. Back pain  RESPIRATORY:  Airway is open and patent. Respirations are spontaneous and non-labored with normal effort and rate.  CARDIAC:  She has a normal rate and rhythm. No peripheral edema noted. Capillary refill < 3 seconds.  ABDOMEN:  No distention noted.  Soft and non-tender upon palpation.  NEUROLOGICAL:  PERRL. Facial expression is symmetrical. Hand grasps are equal bilaterally. Normal sensation in all extremities when touched with finger.  Allergies reported:    Review of patient's allergies indicates:   Allergen Reactions    Droperidol Hives    Haldol [haloperidol lactate] Hives    Penicillins Hives    Droperidol (bulk) Anxiety     STATES "FREAKS OUT".  TOLERATES HALDOL PRIOR TO ARRIVAL AT ANOTHER FACILITY TODAY.        "

## 2024-10-20 ENCOUNTER — HOSPITAL ENCOUNTER (EMERGENCY)
Facility: HOSPITAL | Age: 34
Discharge: HOME | End: 2024-10-20
Attending: EMERGENCY MEDICINE
Payer: MEDICAID

## 2024-10-20 VITALS
BODY MASS INDEX: 18.66 KG/M2 | OXYGEN SATURATION: 96 % | TEMPERATURE: 97 F | HEART RATE: 89 BPM | RESPIRATION RATE: 19 BRPM | SYSTOLIC BLOOD PRESSURE: 145 MMHG | HEIGHT: 62 IN | DIASTOLIC BLOOD PRESSURE: 70 MMHG

## 2024-10-20 DIAGNOSIS — R10.9 ABDOMINAL PAIN: ICD-10-CM

## 2024-10-20 LAB
ALBUMIN SERPL BCP-MCNC: 3 G/DL (ref 3.5–5.2)
ALP SERPL-CCNC: 109 U/L (ref 40–150)
ALT SERPL W/O P-5'-P-CCNC: 16 U/L (ref 10–44)
AMPHET+METHAMPHET UR QL: NEGATIVE
ANION GAP SERPL CALC-SCNC: 14 MMOL/L (ref 8–16)
AST SERPL-CCNC: 17 U/L (ref 10–40)
BACTERIA #/AREA URNS HPF: ABNORMAL /HPF
BARBITURATES UR QL SCN>200 NG/ML: NEGATIVE
BASOPHILS # BLD AUTO: 0.02 K/UL (ref 0–0.2)
BASOPHILS NFR BLD: 0.4 % (ref 0–1.9)
BENZODIAZ UR QL SCN>200 NG/ML: NEGATIVE
BILIRUB SERPL-MCNC: 0.6 MG/DL (ref 0.1–1)
BILIRUB UR QL STRIP: NEGATIVE
BUN SERPL-MCNC: 25 MG/DL (ref 6–20)
BZE UR QL SCN: NEGATIVE
CALCIUM SERPL-MCNC: 9 MG/DL (ref 8.7–10.5)
CANNABINOIDS UR QL SCN: NEGATIVE
CHLORIDE SERPL-SCNC: 97 MMOL/L (ref 95–110)
CK SERPL-CCNC: 67 U/L (ref 20–180)
CLARITY UR: ABNORMAL
CO2 SERPL-SCNC: 28 MMOL/L (ref 23–29)
COLOR UR: ABNORMAL
CREAT SERPL-MCNC: 4 MG/DL (ref 0.5–1.4)
CREAT UR-MCNC: 27.6 MG/DL (ref 15–325)
DIFFERENTIAL METHOD BLD: ABNORMAL
EOSINOPHIL # BLD AUTO: 0 K/UL (ref 0–0.5)
EOSINOPHIL NFR BLD: 0.6 % (ref 0–8)
ERYTHROCYTE [DISTWIDTH] IN BLOOD BY AUTOMATED COUNT: 17.4 % (ref 11.5–14.5)
EST. GFR  (NO RACE VARIABLE): 14 ML/MIN/1.73 M^2
GLUCOSE SERPL-MCNC: 124 MG/DL (ref 70–110)
GLUCOSE UR QL STRIP: NEGATIVE
HCT VFR BLD AUTO: 29.1 % (ref 37–48.5)
HGB BLD-MCNC: 9.2 G/DL (ref 12–16)
HGB UR QL STRIP: ABNORMAL
HYALINE CASTS #/AREA URNS LPF: 0 /LPF
IMM GRANULOCYTES # BLD AUTO: 0.02 K/UL (ref 0–0.04)
IMM GRANULOCYTES NFR BLD AUTO: 0.4 % (ref 0–0.5)
KETONES UR QL STRIP: NEGATIVE
LEUKOCYTE ESTERASE UR QL STRIP: ABNORMAL
LIPASE SERPL-CCNC: 14 U/L (ref 4–60)
LIPASE SERPL-CCNC: 14 U/L (ref 4–60)
LYMPHOCYTES # BLD AUTO: 0.5 K/UL (ref 1–4.8)
LYMPHOCYTES NFR BLD: 10.8 % (ref 18–48)
MAGNESIUM SERPL-MCNC: 2.2 MG/DL (ref 1.6–2.6)
MCH RBC QN AUTO: 28.7 PG (ref 27–31)
MCHC RBC AUTO-ENTMCNC: 31.6 G/DL (ref 32–36)
MCV RBC AUTO: 91 FL (ref 82–98)
METHADONE UR QL SCN>300 NG/ML: NEGATIVE
MICROSCOPIC COMMENT: ABNORMAL
MONOCYTES # BLD AUTO: 0.4 K/UL (ref 0.3–1)
MONOCYTES NFR BLD: 8.2 % (ref 4–15)
NEUTROPHILS # BLD AUTO: 3.8 K/UL (ref 1.8–7.7)
NEUTROPHILS NFR BLD: 79.6 % (ref 38–73)
NITRITE UR QL STRIP: NEGATIVE
NRBC BLD-RTO: 0 /100 WBC
OPIATES UR QL SCN: NEGATIVE
PCP UR QL SCN>25 NG/ML: NEGATIVE
PH UR STRIP: 8 [PH] (ref 5–8)
PLATELET # BLD AUTO: 165 K/UL (ref 150–450)
PMV BLD AUTO: 9.8 FL (ref 9.2–12.9)
POCT GLUCOSE: 174 MG/DL (ref 70–110)
POCT GLUCOSE: 188 MG/DL (ref 70–110)
POCT GLUCOSE: 237 MG/DL (ref 70–110)
POCT GLUCOSE: 58 MG/DL (ref 70–110)
POCT GLUCOSE: 83 MG/DL (ref 70–110)
POTASSIUM SERPL-SCNC: 4.4 MMOL/L (ref 3.5–5.1)
PROT SERPL-MCNC: 6.6 G/DL (ref 6–8.4)
PROT UR QL STRIP: ABNORMAL
RBC # BLD AUTO: 3.21 M/UL (ref 4–5.4)
RBC #/AREA URNS HPF: 25 /HPF (ref 0–4)
SODIUM SERPL-SCNC: 139 MMOL/L (ref 136–145)
SP GR UR STRIP: 1.01 (ref 1–1.03)
SQUAMOUS #/AREA URNS HPF: 1 /HPF
TOXICOLOGY INFORMATION: NORMAL
TSH SERPL DL<=0.005 MIU/L-ACNC: 1.4 UIU/ML (ref 0.4–4)
UNIDENT CRYS URNS QL MICRO: ABNORMAL
URN SPEC COLLECT METH UR: ABNORMAL
UROBILINOGEN UR STRIP-ACNC: NEGATIVE EU/DL
WBC # BLD AUTO: 4.74 K/UL (ref 3.9–12.7)
WBC #/AREA URNS HPF: >100 /HPF (ref 0–5)
WBC CLUMPS URNS QL MICRO: ABNORMAL
YEAST URNS QL MICRO: ABNORMAL

## 2024-10-20 PROCEDURE — 63600175 PHARM REV CODE 636 W HCPCS: Performed by: EMERGENCY MEDICINE

## 2024-10-20 PROCEDURE — 82962 GLUCOSE BLOOD TEST: CPT

## 2024-10-20 PROCEDURE — 99285 EMERGENCY DEPT VISIT HI MDM: CPT | Mod: 25

## 2024-10-20 PROCEDURE — 96374 THER/PROPH/DIAG INJ IV PUSH: CPT

## 2024-10-20 PROCEDURE — 87086 URINE CULTURE/COLONY COUNT: CPT | Performed by: EMERGENCY MEDICINE

## 2024-10-20 PROCEDURE — 93005 ELECTROCARDIOGRAM TRACING: CPT

## 2024-10-20 PROCEDURE — 80307 DRUG TEST PRSMV CHEM ANLYZR: CPT | Performed by: EMERGENCY MEDICINE

## 2024-10-20 PROCEDURE — 93010 ELECTROCARDIOGRAM REPORT: CPT | Mod: ,,, | Performed by: INTERNAL MEDICINE

## 2024-10-20 PROCEDURE — 85025 COMPLETE CBC W/AUTO DIFF WBC: CPT | Performed by: EMERGENCY MEDICINE

## 2024-10-20 PROCEDURE — 82550 ASSAY OF CK (CPK): CPT | Performed by: EMERGENCY MEDICINE

## 2024-10-20 PROCEDURE — 83735 ASSAY OF MAGNESIUM: CPT | Performed by: EMERGENCY MEDICINE

## 2024-10-20 PROCEDURE — 96375 TX/PRO/DX INJ NEW DRUG ADDON: CPT

## 2024-10-20 PROCEDURE — 80307 DRUG TEST PRSMV CHEM ANLYZR: CPT | Mod: 91 | Performed by: EMERGENCY MEDICINE

## 2024-10-20 PROCEDURE — 83690 ASSAY OF LIPASE: CPT | Performed by: EMERGENCY MEDICINE

## 2024-10-20 PROCEDURE — 25000003 PHARM REV CODE 250: Performed by: EMERGENCY MEDICINE

## 2024-10-20 PROCEDURE — 80053 COMPREHEN METABOLIC PANEL: CPT | Performed by: EMERGENCY MEDICINE

## 2024-10-20 PROCEDURE — 84443 ASSAY THYROID STIM HORMONE: CPT | Performed by: EMERGENCY MEDICINE

## 2024-10-20 PROCEDURE — 96361 HYDRATE IV INFUSION ADD-ON: CPT

## 2024-10-20 PROCEDURE — 81000 URINALYSIS NONAUTO W/SCOPE: CPT | Performed by: EMERGENCY MEDICINE

## 2024-10-20 PROCEDURE — S5010 5% DEXTROSE AND 0.45% SALINE: HCPCS | Performed by: EMERGENCY MEDICINE

## 2024-10-20 RX ORDER — CEFUROXIME AXETIL 250 MG/1
250 TABLET ORAL DAILY
Qty: 10 TABLET | Refills: 0 | Status: ON HOLD | OUTPATIENT
Start: 2024-10-20 | End: 2024-10-23 | Stop reason: HOSPADM

## 2024-10-20 RX ORDER — SUCRALFATE 1 G/10ML
1 SUSPENSION ORAL EVERY 6 HOURS
Status: DISCONTINUED | OUTPATIENT
Start: 2024-10-20 | End: 2024-10-20 | Stop reason: HOSPADM

## 2024-10-20 RX ORDER — DIPHENHYDRAMINE HYDROCHLORIDE 50 MG/ML
25 INJECTION INTRAMUSCULAR; INTRAVENOUS
Status: COMPLETED | OUTPATIENT
Start: 2024-10-20 | End: 2024-10-20

## 2024-10-20 RX ORDER — PANTOPRAZOLE SODIUM 40 MG/10ML
40 INJECTION, POWDER, LYOPHILIZED, FOR SOLUTION INTRAVENOUS
Status: COMPLETED | OUTPATIENT
Start: 2024-10-20 | End: 2024-10-20

## 2024-10-20 RX ORDER — ONDANSETRON HYDROCHLORIDE 2 MG/ML
4 INJECTION, SOLUTION INTRAVENOUS
Status: COMPLETED | OUTPATIENT
Start: 2024-10-20 | End: 2024-10-20

## 2024-10-20 RX ORDER — DEXTROSE MONOHYDRATE AND SODIUM CHLORIDE 5; .45 G/100ML; G/100ML
1000 INJECTION, SOLUTION INTRAVENOUS CONTINUOUS
Status: DISCONTINUED | OUTPATIENT
Start: 2024-10-20 | End: 2024-10-20 | Stop reason: HOSPADM

## 2024-10-20 RX ORDER — CEFTRIAXONE 1 G/1
1 INJECTION, POWDER, FOR SOLUTION INTRAMUSCULAR; INTRAVENOUS
Status: COMPLETED | OUTPATIENT
Start: 2024-10-20 | End: 2024-10-20

## 2024-10-20 RX ADMIN — CEFTRIAXONE SODIUM 1 G: 1 INJECTION, POWDER, FOR SOLUTION INTRAMUSCULAR; INTRAVENOUS at 01:10

## 2024-10-20 RX ADMIN — DEXTROSE AND SODIUM CHLORIDE 1000 ML: 5; 450 INJECTION, SOLUTION INTRAVENOUS at 10:10

## 2024-10-20 RX ADMIN — SUCRALFATE 1 G: 1 SUSPENSION ORAL at 10:10

## 2024-10-20 RX ADMIN — DIPHENHYDRAMINE HYDROCHLORIDE 25 MG: 50 INJECTION INTRAMUSCULAR; INTRAVENOUS at 01:10

## 2024-10-20 RX ADMIN — ONDANSETRON 4 MG: 2 INJECTION INTRAMUSCULAR; INTRAVENOUS at 10:10

## 2024-10-20 RX ADMIN — PANTOPRAZOLE SODIUM 40 MG: 40 INJECTION, POWDER, LYOPHILIZED, FOR SOLUTION INTRAVENOUS at 10:10

## 2024-10-20 NOTE — ED PROVIDER NOTES
"Encounter Date: 10/20/2024       History     Chief Complaint   Patient presents with    Hypoglycemia     By EMS for episode of hypoglycemia this AM CBG 57 upon EMS arrival. Given Oral glucose by EMS    Flank Pain     34-year-old female presents with report of hypoglycemia.  Patient with a past medical history of end-stage renal disease on dialysis  and , dialyzed yesterday, as well as diabetes, gastritis, gastroparesis who presents due to an episode of hypoglycemia this morning.  Blood sugar was apparently 53.  She had not eaten.  She was seen yesterday for an episode of hypoglycemia after dialysis.  When she was discharged she was euglycemic.  Patient did not eat this morning.  Initially she said she had not taken any insulin but then states maybe she had --unclear.  Has chronic ongoing unchanged abdominal pain with nausea and vomiting.  Denies fever or chills.  Denies chest pain or shortness of breath.  Does not make urine.  Denies any other problems or complaints.        Review of patient's allergies indicates:   Allergen Reactions    Droperidol Hives    Haldol [haloperidol lactate] Hives    Penicillins Hives    Droperidol (bulk) Anxiety     STATES "FREAKS OUT".  TOLERATES HALDOL PRIOR TO ARRIVAL AT ANOTHER FACILITY TODAY.      Past Medical History:   Diagnosis Date    ESRD on hemodialysis 2022    Gastritis 2022    EGD was 22    Gastroparesis 2022    has not had Emptying study    Heart failure with preserved ejection fraction 2022    EF 55% on 3/22    History of supraventricular tachycardia     Hyperkalemia 2022    Hypertensive emergency 2022    Sickle cell trait 2022    Type 2 diabetes mellitus      Past Surgical History:   Procedure Laterality Date     SECTION      x 3    COLONOSCOPY      COLONOSCOPY N/A 2022    Procedure: COLONOSCOPY;  Surgeon: Jagdeep Cedeno MD;  Location: [facility];  Service: Endoscopy;  Laterality: N/A;    " DESTRUCTION, CILIARY BODY, USING LASER Left 03/08/2024    Procedure: Cyclophotocoagulation G-probe, retrobulbar chlorpromazine left eye;  Surgeon: Fidel Wise MD;  Location: [address];  Service: Ophthalmology;  Laterality: Left;    ENDOSCOPIC ULTRASOUND OF UPPER GASTROINTESTINAL TRACT N/A 12/16/2023    Procedure: ULTRASOUND, UPPER GI TRACT, ENDOSCOPIC;  Surgeon: Micky Paredes III, MD;  Location: [facility];  Service: Endoscopy;  Laterality: N/A;    ESOPHAGOGASTRODUODENOSCOPY N/A 10/18/2019    Procedure: ESOPHAGOGASTRODUODENOSCOPY (EGD);  Surgeon: Gianluca Mendez MD;  Location: [facility];  Service: Endoscopy;  Laterality: N/A;    ESOPHAGOGASTRODUODENOSCOPY N/A 08/24/2022    Procedure: EGD (ESOPHAGOGASTRODUODENOSCOPY);  Surgeon: Micky Paredes III, MD;  Location: [facility];  Service: Endoscopy;  Laterality: N/A;    ESOPHAGOGASTRODUODENOSCOPY N/A 12/05/2022    Procedure: EGD (ESOPHAGOGASTRODUODENOSCOPY);  Surgeon: Marcelo Zhong MD;  Location: [facility];  Service: Endoscopy;  Laterality: N/A;    ESOPHAGOGASTRODUODENOSCOPY N/A 9/13/2024    Procedure: EGD (ESOPHAGOGASTRODUODENOSCOPY);  Surgeon: Marcelo Zhong MD;  Location: [facility];  Service: Endoscopy;  Laterality: N/A;    EYE SURGERY      INSERTION, CATHETER, TUNNELED N/A 06/17/2023    Procedure: Insertion,catheter,tunneled;  Surgeon: Carlos Thurman Jr., MD;  Location: [facility];  Service: General;  Laterality: N/A;    LAPAROSCOPIC CHOLECYSTECTOMY N/A 07/30/2022    Procedure: CHOLECYSTECTOMY, LAPAROSCOPIC;  Surgeon: Grey Perez MD;  Location: [facility];  Service: General;  Laterality: N/A;    PLACEMENT OF DUAL-LUMEN VASCULAR CATHETER Left 07/12/2022    Procedure: INSERTION-CATHETER-JOSEPH;  Surgeon: Dionte Gan MD;  Location: [facility];  Service: General;  Laterality: Left;    PLACEMENT OF DUAL-LUMEN VASCULAR CATHETER Right 07/26/2022    Procedure: INSERTION-CATHETER-Hemosplit;  Surgeon: Dionte Gan MD;  Location: [facility] OR;   Service: General;  Laterality: Right;     Family History   Problem Relation Name Age of Onset    Diabetes Mother      Diabetes Father       Social History     Tobacco Use    Smoking status: Never    Smokeless tobacco: Never   Substance Use Topics    Alcohol use: Not Currently    Drug use: No     Review of Systems   Constitutional: Negative.  Negative for activity change, appetite change, chills, fatigue and fever.   HENT: Negative.  Negative for congestion, ear pain, rhinorrhea, sore throat and trouble swallowing.    Eyes: Negative.  Negative for visual disturbance.   Respiratory: Negative.  Negative for cough and shortness of breath.    Cardiovascular: Negative.  Negative for chest pain, palpitations and leg swelling.        Has chronic ongoing facial edema with no sudden change or worsening   Gastrointestinal:  Positive for abdominal pain, constipation, nausea and vomiting. Negative for abdominal distention, blood in stool and diarrhea.   Endocrine: Negative.    Genitourinary:  Negative for flank pain.        Patient reports being anuric   Musculoskeletal: Negative.  Negative for arthralgias, back pain, gait problem, myalgias, neck pain and neck stiffness.   Skin: Negative.  Negative for rash.   Neurological: Negative.  Negative for dizziness, syncope, facial asymmetry, speech difficulty, weakness, light-headedness and headaches.   Psychiatric/Behavioral: Negative.  Negative for confusion.    All other systems reviewed and are negative.      Physical Exam     Initial Vitals [10/20/24 0926]   BP Pulse Resp Temp SpO2   130/80 79 19 97.3 °F (36.3 °C) 97 %      MAP       --         Physical Exam    Nursing note and vitals reviewed.  Constitutional: She is cooperative. She does not appear ill. No distress.   HENT:   Head: Normocephalic and atraumatic.   Nose: Nose normal. Mouth/Throat: Uvula is midline, oropharynx is clear and moist and mucous membranes are normal. No oral lesions. No uvula swelling. No oropharyngeal  exudate, posterior oropharyngeal edema or posterior oropharyngeal erythema.   Eyes: Conjunctivae, EOM and lids are normal. Pupils are equal, round, and reactive to light. Right eye exhibits no discharge. Left eye exhibits no discharge. No scleral icterus.   Neck: Trachea normal and phonation normal. Neck supple. No stridor present. No JVD present.   Normal range of motion.   Full passive range of motion without pain.     Cardiovascular:  Normal rate, regular rhythm, normal heart sounds, intact distal pulses and normal pulses.     Exam reveals no gallop, no distant heart sounds, no friction rub and no decreased pulses.       No murmur heard.  Pulmonary/Chest: Effort normal and breath sounds normal. No respiratory distress. She has no decreased breath sounds. She has no wheezes. She has no rhonchi. She has no rales. She exhibits no tenderness.   Abdominal: Abdomen is soft. Bowel sounds are normal. She exhibits no distension and no mass. There is generalized abdominal tenderness.   Mild diffuse tenderness with no rebound or guarding, abdomen is nondistended, bowel sounds are normal.  Patient is distractible from pain.   No right CVA tenderness.  No left CVA tenderness. There is no rigidity, no rebound and no guarding. negative psoas sign and negative Rovsing's sign  Musculoskeletal:         General: No tenderness or edema. Normal range of motion.      Right hand: Normal. Normal capillary refill. Normal pulse.      Left hand: Normal. Normal sensation. Normal capillary refill. Normal pulse.      Cervical back: Normal, full passive range of motion without pain, normal range of motion and neck supple. No tenderness or bony tenderness. No pain with movement or spinous process tenderness. Normal range of motion.      Thoracic back: Normal. No tenderness. Normal range of motion.      Lumbar back: Normal. No tenderness. Normal range of motion.      Right lower leg: No swelling or tenderness.      Left lower leg: No swelling or  tenderness.      Right foot: Normal. Normal capillary refill. Normal pulse.      Left foot: Normal. Normal capillary refill. Normal pulse.      Comments: Pulses 2+ throughout, no extremity abnormalities     Neurological: She is alert and oriented to person, place, and time. She has normal strength. No cranial nerve deficit or sensory deficit. Coordination and gait normal. GCS score is 15. GCS eye subscore is 4. GCS verbal subscore is 5. GCS motor subscore is 6.   No focal deficits   Skin: Skin is warm, dry and intact. Capillary refill takes less than 2 seconds. No ecchymosis, no petechiae and no rash noted. No erythema. No pallor.   Psychiatric: She has a normal mood and affect. Her speech is normal and behavior is normal.         ED Course   Procedures  Labs Reviewed   CULTURE, URINE - Abnormal       Result Value    Urine Culture, Routine   (*)     Value: VIRIDANS STREPTOCOCCUS GROUP  > 100,000 cfu/ml  Susceptibility testing not routinely performed.      Narrative:     Specimen Source->Urine   CBC W/ AUTO DIFFERENTIAL - Abnormal    WBC 4.74      RBC 3.21 (*)     Hemoglobin 9.2 (*)     Hematocrit 29.1 (*)     MCV 91      MCH 28.7      MCHC 31.6 (*)     RDW 17.4 (*)     Platelets 165      MPV 9.8      Immature Granulocytes 0.4      Gran # (ANC) 3.8      Immature Grans (Abs) 0.02      Lymph # 0.5 (*)     Mono # 0.4      Eos # 0.0      Baso # 0.02      nRBC 0      Gran % 79.6 (*)     Lymph % 10.8 (*)     Mono % 8.2      Eosinophil % 0.6      Basophil % 0.4      Differential Method Automated     COMPREHENSIVE METABOLIC PANEL - Abnormal    Sodium 139      Potassium 4.4      Chloride 97      CO2 28      Glucose 124 (*)     BUN 25 (*)     Creatinine 4.0 (*)     Calcium 9.0      Total Protein 6.6      Albumin 3.0 (*)     Total Bilirubin 0.6      Alkaline Phosphatase 109      AST 17      ALT 16      eGFR 14 (*)     Anion Gap 14     URINALYSIS, REFLEX TO URINE CULTURE - Abnormal    Specimen UA Urine, Catheterized      Color,  UA [place] (*)     Appearance, UA Cloudy (*)     pH, UA 8.0      Specific Gravity, UA 1.010      Protein, UA 3+ (*)     Glucose, UA Negative      Ketones, UA Negative      Bilirubin (UA) Negative      Occult Blood UA 1+ (*)     Nitrite, UA Negative      Urobilinogen, UA Negative      Leukocytes, UA 3+ (*)     Narrative:     Specimen Source->Urine   URINALYSIS MICROSCOPIC - Abnormal    RBC, UA 25 (*)     WBC, UA >100 (*)     WBC Clumps, UA Moderate (*)     Bacteria Many (*)     Yeast, UA Many (*)     Squam Epithel, UA 1      Hyaline Casts, UA 0      Unclass Suzanne UA None      Microscopic Comment SEE COMMENT      Narrative:     Specimen Source->Urine   POCT GLUCOSE - Abnormal    POCT Glucose 58 (*)    POCT GLUCOSE - Abnormal    POCT Glucose 174 (*)    POCT GLUCOSE - Abnormal    POCT Glucose 237 (*)    LIPASE    Lipase 14     LIPASE    Lipase 14     DRUG SCREEN PANEL, URINE EMERGENCY    Benzodiazepines Negative      Methadone metabolites Negative      Cocaine (Metab.) Negative      Opiate Scrn, Ur Negative      Barbiturate Screen, Ur Negative      Amphetamine Screen, Ur Negative      THC Negative      Phencyclidine Negative      Creatinine, Urine 27.6      Toxicology Information SEE COMMENT      Narrative:     Specimen Source->Urine   CK    CPK 67     MAGNESIUM    Magnesium 2.2     TSH    TSH 1.399     DRUGS OF ABUSE SCREEN, BLOOD    Amphetamine Scrn Negative      Barbiturate Scrn Negative      Benzodiazepines Negative      Cocaine Lvl Negative      Methadone (Dolophine), Serum Negative      Opiates, Blood Negative      Phencyclidine, Blood Negative      Propoxyphene Negative      THC (Marijuana) Metabolite, bl Negative      Alcohol Scrn Negative      Narrative:     drug screen  Release to patient->Immediate   POCT GLUCOSE    POCT Glucose 83          ECG Results              EKG 12-lead (Final result)        Collection Time Result Time QRS Duration OHS QTC Calculation    10/20/24 10:36:51 10/30/24 09:14:47 80 515                      Final result by Interface, Lab In [facility] (10/30/24 09:14:55)                   Narrative:    Test Reason : R10.9,    Vent. Rate : 084 BPM     Atrial Rate : 084 BPM     P-R Int : 158 ms          QRS Dur : 080 ms      QT Int : 436 ms       P-R-T Axes : 042 -07 036 degrees     QTc Int : 515 ms    Normal sinus rhythm  Possible Anterior infarct ,age undetermined  Prolonged QT  Abnormal ECG  When compared with ECG of 29-SEP-2024 08:33,  No significant change was found  Confirmed by Obed Smith MD ([id number]) on 10/30/2024 9:14:45 AM    Referred By: [email or web address]   SELF           Confirmed By:Obed Smith MD                                  Imaging Results              CT Abdomen Pelvis  Without Contrast (Final result)  Result time 10/20/24 10:36:00      Final result by Sunshine Awan MD (10/20/24 10:36:00)                   Impression:      Mild diffuse bladder wall thickening versus incomplete distention recommend correlation clinically if clinical consideration for cystitis.    Extensive vascular calcifications and findings suggesting renal osteodystrophy    Prominent amount of stool within colon    Additional findings as detailed above including small pericardial effusion not appearing significantly changed.  Cholecystectomy clips.      Electronically signed by: Sunshine Awan MD  Date:    10/20/2024  Time:    10:36               Narrative:    EXAMINATION:  CT ABDOMEN PELVIS WITHOUT CONTRAST    CLINICAL HISTORY:  Epigastric pain;    TECHNIQUE:  Low dose axial images, sagittal and coronal reformations were obtained from the lung bases to the pubic symphysis.  No p.o. contrast    COMPARISON:  09/11/2024    FINDINGS:  Visualized lung bases are essentially clear.  Heart appears enlarged a similar degree as before.  Small pericardial effusion does not appear significantly changed.    Extensive vascular calcifications.  Abdominal aorta tapers without aneurysmal dilatation.  Diffuse subcutaneous body wall  edema.  Liver, spleen, adrenal glands, unremarkable appearance.  Kidneys appears slightly small with renal vascular calcifications.  No hydronephrosis.    Cholecystectomy clips.  No biliary duct dilatation.    Prominent amount of stool within the colon.  Normal appearance of the appendix.  No free intraperitoneal air or fluid.    Urinary bladder mildly distended with the wall appearing mildly diffusely thick although accentuated by the incomplete degree of distention    Reproductive organs; uterine calcifications suggesting predominately vascular calcifications.  Cannot exclude also calcified uterine fibroids.  If indicated clinically ultrasound could be obtained to evaluate the reproductive organs.  Adnexal region unremarkable appearance.    Osseous structures show degenerative changes and somewhat diffuse sclerotic appearance suggesting renal osteodystrophy.                                       Medications   pantoprazole injection 40 mg (40 mg Intravenous Given 10/20/24 1025)   ondansetron injection 4 mg (4 mg Intravenous Given 10/20/24 1025)   cefTRIAXone injection 1 g (1 g Intravenous Given 10/20/24 1315)   diphenhydrAMINE injection 25 mg (25 mg Intravenous Given 10/20/24 1315)     Medical Decision Making  Patient presents hypoglycemia, admits that she did not eating to have insulin.  Has not been compliant with regular care admittedly but is going to dialysis.  Does have clonic abdominal pain but reports worsening.  CT scan with no evidence of acute intra-abdominal abnormality.  Symptoms have improved after ED care.  Patient only makes rare urine.  Did provide urine in the emergency room.  Has been given Rocephin.  Symptomatic supportive care discussed, return precautions discussed in detail and strict blood close outpatient evaluation with her nephrologist and primary care provider has been discussed.  Blunt discharge her blood sugar is within normal limits and she is tolerating all fluids states she feels  better and feels ready to go home.    Amount and/or Complexity of Data Reviewed  Labs: ordered. Decision-making details documented in ED Course.  Radiology: ordered.    Risk  Prescription drug management.               ED Course as of 24 Magnesium : 2.2 [AR]    Lipase: 14 [AR]    WBC: 4.74 [AR]    Hemoglobin(!): 9.2 [AR]    Hematocrit(!): 29.1 [AR]    Platelet Count: 165 [AR]    Sodium: 139 [AR]    Potassium: 4.4 [AR]    Chloride: 97 [AR]    CO2: 28 [AR]    Glucose(!): 124 [AR]    BUN(!): 25 [AR]    Creatinine(!): 4.0 [AR]    Calcium: 9.0 [AR]    PROTEIN TOTAL: 6.6 [AR]    Albumin(!): 3.0 [AR]    BILIRUBIN TOTAL: 0.6 [AR]    ALP: 109 [AR]    AST: 17 [AR]    ALT: 16 [AR]      ED Course User Index  [AR] Jazmin Moore MD               Medical Decision Making:   Clinical Tests:   Lab Tests: Ordered and Reviewed  Radiological Study: Ordered and Reviewed  Medical Tests: Ordered and Reviewed             Clinical Impression:  Final diagnoses:  [R10.9] Abdominal pain          ED Disposition Condition    Discharge Stable          ED Prescriptions       Medication Sig Dispense Start Date End Date Auth. Provider    cefUROXime (CEFTIN) 250 MG tablet () Take 1 tablet (250 mg total) by mouth once daily. for 10 days 10 tablet 10/20/2024 10/23/2024 Jazmin Moore MD          Follow-up Information       Follow up With Specialties Details Why Contact Info    Melony Kim NP Family Medicine Schedule an appointment as soon as possible for a visit in 1 day  [address] [place] [id number]  [phone]               Jazmin Moore MD  24

## 2024-10-20 NOTE — ED NOTES
Patient given oral glucose per EMS, I spoke with DR Breaux and gave the patient 8oz OJ while we attempted to obtain vascular access

## 2024-10-20 NOTE — Clinical Note
"Tabby Mary" Lee was seen and treated in our emergency department on 10/20/2024.  She may return to work on 10/21/2024.       If you have any questions or concerns, please don't hesitate to call.       RN    "

## 2024-10-20 NOTE — ED NOTES
Lunch Tray given to the patient.  Pt informed that she is discharged and she can go ahead and call for a ride.

## 2024-10-20 NOTE — DISCHARGE INSTRUCTIONS
Please read and follow discharge instructions and return precautions.  Rest, avoid any strenuous activity, over exertion or overheating.  Alternate water and Pedialyte for hydration.  Make certain to eat meals, do not skip meals and have several snacks throughout the day today.  Do not remain alone.  Please have family members with you to check on you.  DECREASE YOUR INSULIN DOSING/DIABETIC MEDICATION DOSING BY HALF UNTIL YOU ARE EVALUATED BY YOUR PHYSICIAN TOMORROW.  MAKE CERTAIN TO CHECK YOUR BLOOD SUGAR ON A REGULAR BASIS.  Important to see your primary care provider tomorrow.  Attempt use of MiraLax for constipation.  You may also try mineral oil.  Return immediately if you develop new or worsening symptoms or if you have new problems or concerns.

## 2024-10-20 NOTE — ED NOTES
Patient refused oxycodone and tylenol.  States she did not want to upset her stomach.  Provider notified.

## 2024-10-20 NOTE — ED PROVIDER NOTES
"Encounter Date: 10/19/2024       History     Chief Complaint   Patient presents with    Hypoglycemia     49 on arrival    Flank Pain     Left side     Tabby Howard is a 34 y.o. female presenting via EMS for evaluation after episode of hypoglycemia, following dialysis just prior to arrival.  EMS states they did give her some glucose with improvement.  She has also had associated nausea and left-sided abdominal pain.  She has a past medical history of ESRD on hemodialysis (2022), Gastritis (2022), Gastroparesis (2022), Heart failure with preserved ejection fraction (2022), History of supraventricular tachycardia, Hyperkalemia (2022), Hypertensive emergency (2022), Sickle cell trait (2022), and Type 2 diabetes mellitus.      The history is provided by the patient and the EMS personnel.     Review of patient's allergies indicates:   Allergen Reactions    Droperidol Hives    Haldol [haloperidol lactate] Hives    Penicillins Hives    Droperidol (bulk) Anxiety     STATES "FREAKS OUT".  TOLERATES HALDOL PRIOR TO ARRIVAL AT ANOTHER FACILITY TODAY.      Past Medical History:   Diagnosis Date    ESRD on hemodialysis 2022    Gastritis 2022    EGD was 22    Gastroparesis 2022    has not had Emptying study    Heart failure with preserved ejection fraction 2022    EF 55% on 3/22    History of supraventricular tachycardia     Hyperkalemia 2022    Hypertensive emergency 2022    Sickle cell trait 2022    Type 2 diabetes mellitus      Past Surgical History:   Procedure Laterality Date     SECTION      x 3    COLONOSCOPY      COLONOSCOPY N/A 2022    Procedure: COLONOSCOPY;  Surgeon: Jagdeep Cedeno MD;  Location: [facility];  Service: Endoscopy;  Laterality: N/A;    DESTRUCTION, CILIARY BODY, USING LASER Left 2024    Procedure: Cyclophotocoagulation G-probe, retrobulbar chlorpromazine left eye;  Surgeon: Fidel Wise MD;  " [address];  Service: Ophthalmology;  Laterality: Left;    ENDOSCOPIC ULTRASOUND OF UPPER GASTROINTESTINAL TRACT N/A 12/16/2023    Procedure: ULTRASOUND, UPPER GI TRACT, ENDOSCOPIC;  Surgeon: Micky Paredes III, MD;  Location: [facility];  Service: Endoscopy;  Laterality: N/A;    ESOPHAGOGASTRODUODENOSCOPY N/A 10/18/2019    Procedure: ESOPHAGOGASTRODUODENOSCOPY (EGD);  Surgeon: Gianluca Mendez MD;  Location: [facility];  Service: Endoscopy;  Laterality: N/A;    ESOPHAGOGASTRODUODENOSCOPY N/A 08/24/2022    Procedure: EGD (ESOPHAGOGASTRODUODENOSCOPY);  Surgeon: Micky Paredes III, MD;  Location: [facility];  Service: Endoscopy;  Laterality: N/A;    ESOPHAGOGASTRODUODENOSCOPY N/A 12/05/2022    Procedure: EGD (ESOPHAGOGASTRODUODENOSCOPY);  Surgeon: Marcelo Zhong MD;  Location: [facility];  Service: Endoscopy;  Laterality: N/A;    ESOPHAGOGASTRODUODENOSCOPY N/A 9/13/2024    Procedure: EGD (ESOPHAGOGASTRODUODENOSCOPY);  Surgeon: Marcelo Zhong MD;  Location: [facility];  Service: Endoscopy;  Laterality: N/A;    EYE SURGERY      INSERTION, CATHETER, TUNNELED N/A 06/17/2023    Procedure: Insertion,catheter,tunneled;  Surgeon: Carlos Thurman Jr., MD;  Location: [facility];  Service: General;  Laterality: N/A;    LAPAROSCOPIC CHOLECYSTECTOMY N/A 07/30/2022    Procedure: CHOLECYSTECTOMY, LAPAROSCOPIC;  Surgeon: Grey Perez MD;  Location: [facility];  Service: General;  Laterality: N/A;    PLACEMENT OF DUAL-LUMEN VASCULAR CATHETER Left 07/12/2022    Procedure: INSERTION-CATHETER-JOSEPH;  Surgeon: Dionte Gan MD;  Location: [facility] OR;  Service: General;  Laterality: Left;    PLACEMENT OF DUAL-LUMEN VASCULAR CATHETER Right 07/26/2022    Procedure: INSERTION-CATHETER-Hemosplit;  Surgeon: Dionte Gan MD;  Location: [facility] OR;  Service: General;  Laterality: Right;     Family History   Problem Relation Name Age of Onset    Diabetes Mother      Diabetes Father       Social History     Tobacco  Use    Smoking status: Never    Smokeless tobacco: Never   Substance Use Topics    Alcohol use: Not Currently    Drug use: No     Review of Systems   Constitutional:  Negative for chills and fever.        Hypoglycemia   Respiratory:  Negative for cough, chest tightness, shortness of breath and wheezing.    Cardiovascular:  Negative for chest pain and palpitations.   Gastrointestinal:  Positive for abdominal pain, nausea and vomiting. Negative for constipation and diarrhea.   Genitourinary:  Negative for dysuria and hematuria.   Musculoskeletal:  Negative for arthralgias, back pain, joint swelling, myalgias, neck pain and neck stiffness.   Skin:  Negative for color change, pallor, rash and wound.   Neurological:  Negative for weakness and numbness.   Hematological:  Does not bruise/bleed easily.       Physical Exam     Initial Vitals [10/19/24 1641]   BP Pulse Resp Temp SpO2   (!) 124/90 88 20 98 °F (36.7 °C) 100 %      MAP       --         Physical Exam    Nursing note and vitals reviewed.  Constitutional: She is not diaphoretic. No distress.   Chronically ill-appearing   HENT:   Head: Normocephalic and atraumatic.   Right Ear: External ear normal.   Left Ear: External ear normal.   Nose: Nose normal. Mouth/Throat: Oropharynx is clear and moist.   Eyes: Conjunctivae are normal.   Neck: Neck supple.   Normal range of motion.  Cardiovascular:  Normal rate, regular rhythm, normal heart sounds and intact distal pulses.           Pulmonary/Chest: Breath sounds normal. No respiratory distress. She has no wheezes. She has no rhonchi. She has no rales.   Abdominal: Abdomen is soft. She exhibits no distension and no mass. There is abdominal tenderness.   Mild tenderness to palpation noted to left-sided upper abdomen.   Musculoskeletal:         General: No tenderness or edema. Normal range of motion.      Cervical back: Normal range of motion and neck supple.     Neurological: She is alert and oriented to person, place, and  time. She has normal strength. No sensory deficit.   Skin: Skin is warm and dry. No rash and no abscess noted. No erythema.   Psychiatric: She has a normal mood and affect.         ED Course   Procedures  Labs Reviewed   CBC W/ AUTO DIFFERENTIAL - Abnormal       Result Value    WBC 4.90      RBC 2.76 (*)     Hemoglobin 8.0 (*)     Hematocrit 24.9 (*)     MCV 90      MCH 29.0      MCHC 32.1      RDW 17.5 (*)     Platelets 156      MPV 10.2      Immature Granulocytes 0.2      Gran # (ANC) 3.7      Immature Grans (Abs) 0.01      Lymph # 0.5 (*)     Mono # 0.5      Eos # 0.1      Baso # 0.01      nRBC 0      Gran % 76.2 (*)     Lymph % 11.0 (*)     Mono % 10.8      Eosinophil % 1.6      Basophil % 0.2      Differential Method Automated     COMPREHENSIVE METABOLIC PANEL - Abnormal    Sodium 140      Potassium 3.4 (*)     Chloride 97      CO2 29      Glucose 62 (*)     BUN 15      Creatinine 2.6 (*)     Calcium 9.1      Total Protein 7.0      Albumin 3.2 (*)     Total Bilirubin 0.6      Alkaline Phosphatase 113      AST 15      ALT 18      eGFR 24 (*)     Anion Gap 14     PHOSPHORUS - Abnormal    Phosphorus 2.5 (*)    POCT GLUCOSE - Abnormal    POCT Glucose 184 (*)    POCT GLUCOSE - Abnormal    POCT Glucose 51 (*)    POCT GLUCOSE - Abnormal    POCT Glucose 63 (*)    MAGNESIUM    Magnesium 1.8     BETA - HYDROXYBUTYRATE, SERUM    Beta-Hydroxybutyrate 0.1     POCT GLUCOSE MONITORING CONTINUOUS          Imaging Results    None          Medications   acetaminophen tablet 1,000 mg (1,000 mg Oral Not Given 10/19/24 2006)   oxyCODONE immediate release tablet 5 mg (5 mg Oral Not Given 10/19/24 2015)   dextrose 50 % in water (D50W) injection 25 g (25 g Intravenous Given 10/19/24 1645)   morphine injection 4 mg (4 mg Intravenous Given 10/19/24 1742)   promethazine (PHENERGAN) 25 mg in 0.9% NaCl 50 mL IVPB (0 mg Intravenous Stopped 10/19/24 1808)   dextrose 50% injection 25 g (25 g Intravenous Given 10/19/24 1919)     Medical  Decision Making  Differential diagnosis:  Hypoglycemia  DKA  Electrolyte imbalance  Gastritis    Pt emergently evaluated here in the ED.    Hypoglycemia did respond to D50 upon arrival here in the ED.  Labs are stable without hyperkalemia.  She is not acidotic.  Patient states she has not eaten anything today, and has not taken any insulin since yesterday.  She is given medication for the pain and nausea.  She has been able to tolerate some pudding and crackers.  After her blood sugar dropped again, an additional dose of D50 was given.  Blood sugar is now 188.  She is tolerating oral liquids, including sprite here in the emergency department.  Symptoms likely related to decreased volume and lack of eating after dialysis treatment.  We feel comfortable discharging her home at this time to follow up as scheduled.  She voices understanding and is agreeable with the plan.  She is given specific return precautions.    Amount and/or Complexity of Data Reviewed  Labs: ordered. Decision-making details documented in ED Course.    Risk  OTC drugs.  Prescription drug management.                                      Clinical Impression:  Final diagnoses:  [E16.2] Hypoglycemia (Primary)  [N18.6, Z99.2] ESRD (end stage renal disease) on dialysis  [R11.0] Nausea  [R10.12] Left upper quadrant abdominal pain          ED Disposition Condition    Discharge Stable          ED Prescriptions    None       Follow-up Information       Follow up With Specialties Details Why Contact Info Additional Information    [facility] -  Emergency Medicine  As needed, If symptoms worsen [address] [place] [address] [address] [place] [place] [address] 1st floor             Thelma Mata PA-C  10/19/24 2017

## 2024-10-21 LAB — BACTERIA UR CULT: ABNORMAL

## 2024-10-22 ENCOUNTER — HOSPITAL ENCOUNTER (OUTPATIENT)
Facility: HOSPITAL | Age: 34
Discharge: HOME | End: 2024-10-23
Attending: STUDENT IN AN ORGANIZED HEALTH CARE EDUCATION/TRAINING PROGRAM | Admitting: STUDENT IN AN ORGANIZED HEALTH CARE EDUCATION/TRAINING PROGRAM
Payer: MEDICAID

## 2024-10-22 DIAGNOSIS — D64.9 ANEMIA, UNSPECIFIED TYPE: ICD-10-CM

## 2024-10-22 DIAGNOSIS — N18.6 ANEMIA IN ESRD (END-STAGE RENAL DISEASE): ICD-10-CM

## 2024-10-22 DIAGNOSIS — R94.31 QT PROLONGATION: ICD-10-CM

## 2024-10-22 DIAGNOSIS — N30.00 ACUTE CYSTITIS WITHOUT HEMATURIA: Primary | ICD-10-CM

## 2024-10-22 DIAGNOSIS — Z99.2 ESRD (END STAGE RENAL DISEASE) ON DIALYSIS: ICD-10-CM

## 2024-10-22 DIAGNOSIS — D63.1 ANEMIA DUE TO STAGE 5 CHRONIC KIDNEY DISEASE, NOT ON CHRONIC DIALYSIS: ICD-10-CM

## 2024-10-22 DIAGNOSIS — E16.2 HYPOGLYCEMIA: ICD-10-CM

## 2024-10-22 DIAGNOSIS — G89.29 CHRONIC BILATERAL LOW BACK PAIN, UNSPECIFIED WHETHER SCIATICA PRESENT: ICD-10-CM

## 2024-10-22 DIAGNOSIS — N18.6 ESRD (END STAGE RENAL DISEASE) ON DIALYSIS: ICD-10-CM

## 2024-10-22 DIAGNOSIS — R07.9 CHEST PAIN: ICD-10-CM

## 2024-10-22 DIAGNOSIS — N18.5 ANEMIA DUE TO STAGE 5 CHRONIC KIDNEY DISEASE, NOT ON CHRONIC DIALYSIS: ICD-10-CM

## 2024-10-22 DIAGNOSIS — D63.1 ANEMIA IN ESRD (END-STAGE RENAL DISEASE): ICD-10-CM

## 2024-10-22 DIAGNOSIS — M54.50 CHRONIC BILATERAL LOW BACK PAIN, UNSPECIFIED WHETHER SCIATICA PRESENT: ICD-10-CM

## 2024-10-22 PROBLEM — Z79.01 LONG TERM (CURRENT) USE OF ANTICOAGULANTS: Status: ACTIVE | Noted: 2024-10-22

## 2024-10-22 PROBLEM — N39.0 UTI (URINARY TRACT INFECTION): Status: ACTIVE | Noted: 2024-10-22

## 2024-10-22 LAB
ABO + RH BLD: NORMAL
ALBUMIN SERPL BCP-MCNC: 3.1 G/DL (ref 3.5–5.2)
ALP SERPL-CCNC: 145 U/L (ref 40–150)
ALT SERPL W/O P-5'-P-CCNC: 71 U/L (ref 10–44)
ANION GAP SERPL CALC-SCNC: 13 MMOL/L (ref 8–16)
AST SERPL-CCNC: 79 U/L (ref 10–40)
BASOPHILS # BLD AUTO: 0.01 K/UL (ref 0–0.2)
BASOPHILS NFR BLD: 0.3 % (ref 0–1.9)
BILIRUB SERPL-MCNC: 0.7 MG/DL (ref 0.1–1)
BLD GP AB SCN CELLS X3 SERPL QL: NORMAL
BUN SERPL-MCNC: 18 MG/DL (ref 6–20)
CALCIUM SERPL-MCNC: 8.5 MG/DL (ref 8.7–10.5)
CHLORIDE SERPL-SCNC: 99 MMOL/L (ref 95–110)
CO2 SERPL-SCNC: 28 MMOL/L (ref 23–29)
CREAT SERPL-MCNC: 3.5 MG/DL (ref 0.5–1.4)
DIFFERENTIAL METHOD BLD: ABNORMAL
EOSINOPHIL # BLD AUTO: 0.1 K/UL (ref 0–0.5)
EOSINOPHIL NFR BLD: 1.5 % (ref 0–8)
ERYTHROCYTE [DISTWIDTH] IN BLOOD BY AUTOMATED COUNT: 17.8 % (ref 11.5–14.5)
EST. GFR  (NO RACE VARIABLE): 17 ML/MIN/1.73 M^2
GLUCOSE SERPL-MCNC: 68 MG/DL (ref 70–110)
HCT VFR BLD AUTO: 22.3 % (ref 37–48.5)
HGB BLD-MCNC: 7.1 G/DL (ref 12–16)
IMM GRANULOCYTES # BLD AUTO: 0.02 K/UL (ref 0–0.04)
IMM GRANULOCYTES NFR BLD AUTO: 0.6 % (ref 0–0.5)
LYMPHOCYTES # BLD AUTO: 0.5 K/UL (ref 1–4.8)
LYMPHOCYTES NFR BLD: 15.1 % (ref 18–48)
MAGNESIUM SERPL-MCNC: 2 MG/DL (ref 1.6–2.6)
MCH RBC QN AUTO: 28.7 PG (ref 27–31)
MCHC RBC AUTO-ENTMCNC: 31.8 G/DL (ref 32–36)
MCV RBC AUTO: 90 FL (ref 82–98)
MONOCYTES # BLD AUTO: 0.3 K/UL (ref 0.3–1)
MONOCYTES NFR BLD: 9.5 % (ref 4–15)
NEUTROPHILS # BLD AUTO: 2.5 K/UL (ref 1.8–7.7)
NEUTROPHILS NFR BLD: 73 % (ref 38–73)
NRBC BLD-RTO: 0 /100 WBC
PHOSPHATE SERPL-MCNC: 3 MG/DL (ref 2.7–4.5)
PLATELET # BLD AUTO: 124 K/UL (ref 150–450)
PMV BLD AUTO: 9.4 FL (ref 9.2–12.9)
POCT GLUCOSE: 119 MG/DL (ref 70–110)
POCT GLUCOSE: 89 MG/DL (ref 70–110)
POTASSIUM SERPL-SCNC: 3.6 MMOL/L (ref 3.5–5.1)
PROT SERPL-MCNC: 6.5 G/DL (ref 6–8.4)
RBC # BLD AUTO: 2.47 M/UL (ref 4–5.4)
SODIUM SERPL-SCNC: 140 MMOL/L (ref 136–145)
SPECIMEN OUTDATE: NORMAL
WBC # BLD AUTO: 3.37 K/UL (ref 3.9–12.7)

## 2024-10-22 PROCEDURE — 25000003 PHARM REV CODE 250

## 2024-10-22 PROCEDURE — 99900035 HC TECH TIME PER 15 MIN (STAT)

## 2024-10-22 PROCEDURE — 94799 UNLISTED PULMONARY SVC/PX: CPT

## 2024-10-22 PROCEDURE — 86920 COMPATIBILITY TEST SPIN: CPT | Performed by: STUDENT IN AN ORGANIZED HEALTH CARE EDUCATION/TRAINING PROGRAM

## 2024-10-22 PROCEDURE — 99285 EMERGENCY DEPT VISIT HI MDM: CPT | Mod: 25

## 2024-10-22 PROCEDURE — 96374 THER/PROPH/DIAG INJ IV PUSH: CPT

## 2024-10-22 PROCEDURE — 83735 ASSAY OF MAGNESIUM: CPT | Performed by: STUDENT IN AN ORGANIZED HEALTH CARE EDUCATION/TRAINING PROGRAM

## 2024-10-22 PROCEDURE — 63600175 PHARM REV CODE 636 W HCPCS

## 2024-10-22 PROCEDURE — G0378 HOSPITAL OBSERVATION PER HR: HCPCS

## 2024-10-22 PROCEDURE — 80053 COMPREHEN METABOLIC PANEL: CPT | Performed by: STUDENT IN AN ORGANIZED HEALTH CARE EDUCATION/TRAINING PROGRAM

## 2024-10-22 PROCEDURE — 82962 GLUCOSE BLOOD TEST: CPT

## 2024-10-22 PROCEDURE — 84100 ASSAY OF PHOSPHORUS: CPT | Performed by: STUDENT IN AN ORGANIZED HEALTH CARE EDUCATION/TRAINING PROGRAM

## 2024-10-22 PROCEDURE — 85025 COMPLETE CBC W/AUTO DIFF WBC: CPT | Performed by: STUDENT IN AN ORGANIZED HEALTH CARE EDUCATION/TRAINING PROGRAM

## 2024-10-22 PROCEDURE — 86850 RBC ANTIBODY SCREEN: CPT | Performed by: STUDENT IN AN ORGANIZED HEALTH CARE EDUCATION/TRAINING PROGRAM

## 2024-10-22 PROCEDURE — 36415 COLL VENOUS BLD VENIPUNCTURE: CPT | Performed by: STUDENT IN AN ORGANIZED HEALTH CARE EDUCATION/TRAINING PROGRAM

## 2024-10-22 PROCEDURE — 93010 ELECTROCARDIOGRAM REPORT: CPT | Mod: ,,, | Performed by: GENERAL PRACTICE

## 2024-10-22 PROCEDURE — 86900 BLOOD TYPING SEROLOGIC ABO: CPT | Performed by: STUDENT IN AN ORGANIZED HEALTH CARE EDUCATION/TRAINING PROGRAM

## 2024-10-22 PROCEDURE — 93005 ELECTROCARDIOGRAM TRACING: CPT

## 2024-10-22 PROCEDURE — 25000003 PHARM REV CODE 250: Performed by: STUDENT IN AN ORGANIZED HEALTH CARE EDUCATION/TRAINING PROGRAM

## 2024-10-22 PROCEDURE — 94760 N-INVAS EAR/PLS OXIMETRY 1: CPT

## 2024-10-22 RX ORDER — OXYCODONE HYDROCHLORIDE 5 MG/1
5 TABLET ORAL EVERY 6 HOURS PRN
Status: DISCONTINUED | OUTPATIENT
Start: 2024-10-22 | End: 2024-10-23 | Stop reason: HOSPADM

## 2024-10-22 RX ORDER — CEFTRIAXONE 1 G/1
1 INJECTION, POWDER, FOR SOLUTION INTRAMUSCULAR; INTRAVENOUS
Status: DISCONTINUED | OUTPATIENT
Start: 2024-10-22 | End: 2024-10-23 | Stop reason: HOSPADM

## 2024-10-22 RX ORDER — HYDROCODONE BITARTRATE AND ACETAMINOPHEN 500; 5 MG/1; MG/1
TABLET ORAL
Status: DISCONTINUED | OUTPATIENT
Start: 2024-10-22 | End: 2024-10-23 | Stop reason: HOSPADM

## 2024-10-22 RX ORDER — PANTOPRAZOLE SODIUM 40 MG/1
40 TABLET, DELAYED RELEASE ORAL DAILY
Status: DISCONTINUED | OUTPATIENT
Start: 2024-10-23 | End: 2024-10-23 | Stop reason: HOSPADM

## 2024-10-22 RX ORDER — OXYCODONE HYDROCHLORIDE 10 MG/1
10 TABLET ORAL
Status: DISCONTINUED | OUTPATIENT
Start: 2024-10-22 | End: 2024-10-22

## 2024-10-22 RX ORDER — SODIUM BICARBONATE 650 MG/1
650 TABLET ORAL 3 TIMES DAILY
Status: DISCONTINUED | OUTPATIENT
Start: 2024-10-22 | End: 2024-10-22

## 2024-10-22 RX ORDER — ACETAMINOPHEN 325 MG/1
650 TABLET ORAL EVERY 4 HOURS PRN
Status: DISCONTINUED | OUTPATIENT
Start: 2024-10-22 | End: 2024-10-23 | Stop reason: HOSPADM

## 2024-10-22 RX ORDER — ONDANSETRON HYDROCHLORIDE 2 MG/ML
4 INJECTION, SOLUTION INTRAVENOUS EVERY 8 HOURS PRN
Status: DISCONTINUED | OUTPATIENT
Start: 2024-10-22 | End: 2024-10-23 | Stop reason: HOSPADM

## 2024-10-22 RX ORDER — ACETAMINOPHEN 500 MG
1000 TABLET ORAL
Status: COMPLETED | OUTPATIENT
Start: 2024-10-22 | End: 2024-10-22

## 2024-10-22 RX ORDER — LEVETIRACETAM 250 MG/1
500 TABLET ORAL 2 TIMES DAILY
Status: DISCONTINUED | OUTPATIENT
Start: 2024-10-22 | End: 2024-10-23 | Stop reason: HOSPADM

## 2024-10-22 RX ORDER — OXYCODONE HYDROCHLORIDE 10 MG/1
10 TABLET ORAL EVERY 6 HOURS PRN
Status: DISCONTINUED | OUTPATIENT
Start: 2024-10-22 | End: 2024-10-23 | Stop reason: HOSPADM

## 2024-10-22 RX ORDER — PROCHLORPERAZINE EDISYLATE 5 MG/ML
5 INJECTION INTRAMUSCULAR; INTRAVENOUS EVERY 6 HOURS PRN
Status: DISCONTINUED | OUTPATIENT
Start: 2024-10-22 | End: 2024-10-23 | Stop reason: HOSPADM

## 2024-10-22 RX ORDER — IBUPROFEN 200 MG
24 TABLET ORAL
Status: DISCONTINUED | OUTPATIENT
Start: 2024-10-22 | End: 2024-10-23 | Stop reason: HOSPADM

## 2024-10-22 RX ORDER — NALOXONE HCL 0.4 MG/ML
0.02 VIAL (ML) INJECTION
Status: DISCONTINUED | OUTPATIENT
Start: 2024-10-22 | End: 2024-10-23 | Stop reason: HOSPADM

## 2024-10-22 RX ORDER — GLUCAGON 1 MG
1 KIT INJECTION
Status: DISCONTINUED | OUTPATIENT
Start: 2024-10-22 | End: 2024-10-23 | Stop reason: HOSPADM

## 2024-10-22 RX ORDER — INSULIN ASPART 100 [IU]/ML
0-5 INJECTION, SOLUTION INTRAVENOUS; SUBCUTANEOUS
Status: DISCONTINUED | OUTPATIENT
Start: 2024-10-22 | End: 2024-10-23 | Stop reason: HOSPADM

## 2024-10-22 RX ORDER — LIDOCAINE 50 MG/G
1 PATCH TOPICAL
Status: DISCONTINUED | OUTPATIENT
Start: 2024-10-22 | End: 2024-10-23 | Stop reason: HOSPADM

## 2024-10-22 RX ORDER — SEVELAMER CARBONATE 800 MG/1
800 TABLET, FILM COATED ORAL
Status: DISCONTINUED | OUTPATIENT
Start: 2024-10-23 | End: 2024-10-23 | Stop reason: HOSPADM

## 2024-10-22 RX ORDER — IBUPROFEN 200 MG
16 TABLET ORAL
Status: DISCONTINUED | OUTPATIENT
Start: 2024-10-22 | End: 2024-10-23 | Stop reason: HOSPADM

## 2024-10-22 RX ORDER — SODIUM CHLORIDE 0.9 % (FLUSH) 0.9 %
10 SYRINGE (ML) INJECTION EVERY 12 HOURS PRN
Status: DISCONTINUED | OUTPATIENT
Start: 2024-10-22 | End: 2024-10-23 | Stop reason: HOSPADM

## 2024-10-22 RX ORDER — IPRATROPIUM BROMIDE AND ALBUTEROL SULFATE 2.5; .5 MG/3ML; MG/3ML
3 SOLUTION RESPIRATORY (INHALATION) EVERY 6 HOURS PRN
Status: DISCONTINUED | OUTPATIENT
Start: 2024-10-22 | End: 2024-10-23 | Stop reason: HOSPADM

## 2024-10-22 RX ORDER — HYDRALAZINE HYDROCHLORIDE 25 MG/1
50 TABLET, FILM COATED ORAL EVERY 8 HOURS
Status: DISCONTINUED | OUTPATIENT
Start: 2024-10-22 | End: 2024-10-23 | Stop reason: HOSPADM

## 2024-10-22 RX ORDER — SIMETHICONE 80 MG
1 TABLET,CHEWABLE ORAL 4 TIMES DAILY PRN
Status: DISCONTINUED | OUTPATIENT
Start: 2024-10-22 | End: 2024-10-23 | Stop reason: HOSPADM

## 2024-10-22 RX ORDER — METHOCARBAMOL 500 MG/1
1000 TABLET, FILM COATED ORAL
Status: COMPLETED | OUTPATIENT
Start: 2024-10-22 | End: 2024-10-22

## 2024-10-22 RX ADMIN — SODIUM BICARBONATE 650 MG TABLET 650 MG: at 08:10

## 2024-10-22 RX ADMIN — CEFTRIAXONE SODIUM 1 G: 1 INJECTION, POWDER, FOR SOLUTION INTRAMUSCULAR; INTRAVENOUS at 06:10

## 2024-10-22 RX ADMIN — LEVETIRACETAM 500 MG: 250 TABLET, FILM COATED ORAL at 07:10

## 2024-10-22 RX ADMIN — HYDRALAZINE HYDROCHLORIDE 50 MG: 25 TABLET ORAL at 04:10

## 2024-10-22 RX ADMIN — ACETAMINOPHEN 1000 MG: 500 TABLET ORAL at 03:10

## 2024-10-22 RX ADMIN — HYDRALAZINE HYDROCHLORIDE 50 MG: 25 TABLET ORAL at 08:10

## 2024-10-22 RX ADMIN — OXYCODONE HYDROCHLORIDE 10 MG: 10 TABLET ORAL at 07:10

## 2024-10-22 RX ADMIN — METHOCARBAMOL 1000 MG: 500 TABLET ORAL at 03:10

## 2024-10-23 ENCOUNTER — HOSPITAL ENCOUNTER (OUTPATIENT)
Facility: HOSPITAL | Age: 34
Discharge: HOME | End: 2024-10-25
Attending: STUDENT IN AN ORGANIZED HEALTH CARE EDUCATION/TRAINING PROGRAM | Admitting: STUDENT IN AN ORGANIZED HEALTH CARE EDUCATION/TRAINING PROGRAM
Payer: MEDICAID

## 2024-10-23 VITALS
DIASTOLIC BLOOD PRESSURE: 60 MMHG | WEIGHT: 130.06 LBS | OXYGEN SATURATION: 94 % | BODY MASS INDEX: 23.93 KG/M2 | HEIGHT: 62 IN | RESPIRATION RATE: 18 BRPM | HEART RATE: 90 BPM | TEMPERATURE: 98 F | SYSTOLIC BLOOD PRESSURE: 180 MMHG

## 2024-10-23 DIAGNOSIS — R11.2 NAUSEA AND VOMITING, UNSPECIFIED VOMITING TYPE: ICD-10-CM

## 2024-10-23 DIAGNOSIS — R10.13 CHRONIC EPIGASTRIC PAIN: ICD-10-CM

## 2024-10-23 DIAGNOSIS — G89.29 CHRONIC EPIGASTRIC PAIN: ICD-10-CM

## 2024-10-23 DIAGNOSIS — N30.90 CYSTITIS: Primary | ICD-10-CM

## 2024-10-23 DIAGNOSIS — R10.9 ABDOMINAL PAIN, UNSPECIFIED ABDOMINAL LOCATION: ICD-10-CM

## 2024-10-23 LAB
ALBUMIN SERPL BCP-MCNC: 2.8 G/DL (ref 3.5–5.2)
ALP SERPL-CCNC: 135 U/L (ref 40–150)
ALT SERPL W/O P-5'-P-CCNC: 53 U/L (ref 10–44)
AMPHETAMINES SERPL QL: NEGATIVE
ANION GAP SERPL CALC-SCNC: 13 MMOL/L (ref 8–16)
AST SERPL-CCNC: 42 U/L (ref 10–40)
BARBITURATES SERPL QL SCN: NEGATIVE
BENZODIAZ SERPL QL SCN: NEGATIVE
BILIRUB SERPL-MCNC: 0.6 MG/DL (ref 0.1–1)
BLD PROD TYP BPU: NORMAL
BLOOD UNIT EXPIRATION DATE: NORMAL
BLOOD UNIT TYPE CODE: 6200
BLOOD UNIT TYPE: NORMAL
BUN SERPL-MCNC: 25 MG/DL (ref 6–20)
BZE SERPL QL: NEGATIVE
CALCIUM SERPL-MCNC: 9.2 MG/DL (ref 8.7–10.5)
CARBOXYTHC SERPL QL SCN: NEGATIVE
CHLORIDE SERPL-SCNC: 99 MMOL/L (ref 95–110)
CO2 SERPL-SCNC: 27 MMOL/L (ref 23–29)
CODING SYSTEM: NORMAL
CREAT SERPL-MCNC: 4.7 MG/DL (ref 0.5–1.4)
CROSSMATCH INTERPRETATION: NORMAL
DISPENSE STATUS: NORMAL
ERYTHROCYTE [DISTWIDTH] IN BLOOD BY AUTOMATED COUNT: 18.1 % (ref 11.5–14.5)
EST. GFR  (NO RACE VARIABLE): 12 ML/MIN/1.73 M^2
ETHANOL SERPL QL SCN: NEGATIVE
FERRITIN SERPL-MCNC: 3095 NG/ML (ref 20–300)
GLUCOSE SERPL-MCNC: 226 MG/DL (ref 70–110)
HCT VFR BLD AUTO: 21.6 % (ref 37–48.5)
HGB BLD-MCNC: 6.9 G/DL (ref 12–16)
IRON SERPL-MCNC: 70 UG/DL (ref 30–160)
MAGNESIUM SERPL-MCNC: 2.2 MG/DL (ref 1.6–2.6)
MCH RBC QN AUTO: 29.1 PG (ref 27–31)
MCHC RBC AUTO-ENTMCNC: 31.9 G/DL (ref 32–36)
MCV RBC AUTO: 91 FL (ref 82–98)
METHADONE SERPL QL SCN: NEGATIVE
NUM UNITS TRANS PACKED RBC: NORMAL
OPIATES SERPL QL SCN: NEGATIVE
PCP SERPL QL SCN: NEGATIVE
PHOSPHATE SERPL-MCNC: 5.7 MG/DL (ref 2.7–4.5)
PLATELET # BLD AUTO: 135 K/UL (ref 150–450)
PMV BLD AUTO: 11 FL (ref 9.2–12.9)
POCT GLUCOSE: 107 MG/DL (ref 70–110)
POCT GLUCOSE: 191 MG/DL (ref 70–110)
POCT GLUCOSE: 288 MG/DL (ref 70–110)
POCT GLUCOSE: 76 MG/DL (ref 70–110)
POTASSIUM SERPL-SCNC: 4.6 MMOL/L (ref 3.5–5.1)
PROPOXYPH SERPL QL: NEGATIVE
PROT SERPL-MCNC: 6 G/DL (ref 6–8.4)
RBC # BLD AUTO: 2.37 M/UL (ref 4–5.4)
SATURATED IRON: 34 % (ref 20–50)
SODIUM SERPL-SCNC: 139 MMOL/L (ref 136–145)
TOTAL IRON BINDING CAPACITY: 203 UG/DL (ref 250–450)
TRANSFERRIN SERPL-MCNC: 145 MG/DL (ref 200–375)
WBC # BLD AUTO: 3.32 K/UL (ref 3.9–12.7)

## 2024-10-23 PROCEDURE — 85025 COMPLETE CBC W/AUTO DIFF WBC: CPT | Performed by: STUDENT IN AN ORGANIZED HEALTH CARE EDUCATION/TRAINING PROGRAM

## 2024-10-23 PROCEDURE — 82803 BLOOD GASES ANY COMBINATION: CPT

## 2024-10-23 PROCEDURE — 84100 ASSAY OF PHOSPHORUS: CPT

## 2024-10-23 PROCEDURE — 36415 COLL VENOUS BLD VENIPUNCTURE: CPT

## 2024-10-23 PROCEDURE — 36415 COLL VENOUS BLD VENIPUNCTURE: CPT | Performed by: STUDENT IN AN ORGANIZED HEALTH CARE EDUCATION/TRAINING PROGRAM

## 2024-10-23 PROCEDURE — 63600175 PHARM REV CODE 636 W HCPCS: Performed by: NURSE PRACTITIONER

## 2024-10-23 PROCEDURE — 94761 N-INVAS EAR/PLS OXIMETRY MLT: CPT

## 2024-10-23 PROCEDURE — 82962 GLUCOSE BLOOD TEST: CPT

## 2024-10-23 PROCEDURE — 82010 KETONE BODYS QUAN: CPT | Performed by: STUDENT IN AN ORGANIZED HEALTH CARE EDUCATION/TRAINING PROGRAM

## 2024-10-23 PROCEDURE — 83540 ASSAY OF IRON: CPT | Performed by: NURSE PRACTITIONER

## 2024-10-23 PROCEDURE — 99900035 HC TECH TIME PER 15 MIN (STAT)

## 2024-10-23 PROCEDURE — 90935 HEMODIALYSIS ONE EVALUATION: CPT

## 2024-10-23 PROCEDURE — 83735 ASSAY OF MAGNESIUM: CPT

## 2024-10-23 PROCEDURE — 36430 TRANSFUSION BLD/BLD COMPNT: CPT

## 2024-10-23 PROCEDURE — 85027 COMPLETE CBC AUTOMATED: CPT

## 2024-10-23 PROCEDURE — 25000003 PHARM REV CODE 250

## 2024-10-23 PROCEDURE — 94760 N-INVAS EAR/PLS OXIMETRY 1: CPT | Mod: XB

## 2024-10-23 PROCEDURE — 99285 EMERGENCY DEPT VISIT HI MDM: CPT | Mod: 25

## 2024-10-23 PROCEDURE — 83690 ASSAY OF LIPASE: CPT | Performed by: STUDENT IN AN ORGANIZED HEALTH CARE EDUCATION/TRAINING PROGRAM

## 2024-10-23 PROCEDURE — 63600175 PHARM REV CODE 636 W HCPCS

## 2024-10-23 PROCEDURE — 94761 N-INVAS EAR/PLS OXIMETRY MLT: CPT | Mod: XB

## 2024-10-23 PROCEDURE — P9016 RBC LEUKOCYTES REDUCED: HCPCS | Performed by: STUDENT IN AN ORGANIZED HEALTH CARE EDUCATION/TRAINING PROGRAM

## 2024-10-23 PROCEDURE — 36415 COLL VENOUS BLD VENIPUNCTURE: CPT | Performed by: NURSE PRACTITIONER

## 2024-10-23 PROCEDURE — 96372 THER/PROPH/DIAG INJ SC/IM: CPT

## 2024-10-23 PROCEDURE — 82728 ASSAY OF FERRITIN: CPT | Performed by: NURSE PRACTITIONER

## 2024-10-23 PROCEDURE — 83605 ASSAY OF LACTIC ACID: CPT | Performed by: STUDENT IN AN ORGANIZED HEALTH CARE EDUCATION/TRAINING PROGRAM

## 2024-10-23 PROCEDURE — G0257 UNSCHED DIALYSIS ESRD PT HOS: HCPCS

## 2024-10-23 PROCEDURE — 36416 COLLJ CAPILLARY BLOOD SPEC: CPT

## 2024-10-23 PROCEDURE — 80053 COMPREHEN METABOLIC PANEL: CPT

## 2024-10-23 PROCEDURE — 80053 COMPREHEN METABOLIC PANEL: CPT | Mod: 59 | Performed by: STUDENT IN AN ORGANIZED HEALTH CARE EDUCATION/TRAINING PROGRAM

## 2024-10-23 PROCEDURE — 96375 TX/PRO/DX INJ NEW DRUG ADDON: CPT | Mod: 59

## 2024-10-23 PROCEDURE — 84702 CHORIONIC GONADOTROPIN TEST: CPT | Performed by: STUDENT IN AN ORGANIZED HEALTH CARE EDUCATION/TRAINING PROGRAM

## 2024-10-23 PROCEDURE — G0378 HOSPITAL OBSERVATION PER HR: HCPCS

## 2024-10-23 RX ORDER — PROCHLORPERAZINE EDISYLATE 5 MG/ML
5 INJECTION INTRAMUSCULAR; INTRAVENOUS
Status: COMPLETED | OUTPATIENT
Start: 2024-10-23 | End: 2024-10-24

## 2024-10-23 RX ORDER — HYDROMORPHONE HYDROCHLORIDE 1 MG/ML
0.5 INJECTION, SOLUTION INTRAMUSCULAR; INTRAVENOUS; SUBCUTANEOUS ONCE
Status: COMPLETED | OUTPATIENT
Start: 2024-10-23 | End: 2024-10-23

## 2024-10-23 RX ORDER — CEFUROXIME AXETIL 250 MG/1
250 TABLET ORAL EVERY 12 HOURS
Qty: 20 TABLET | Refills: 0 | Status: SHIPPED | OUTPATIENT
Start: 2024-10-23 | End: 2024-11-08

## 2024-10-23 RX ORDER — DIPHENHYDRAMINE HYDROCHLORIDE 50 MG/ML
12.5 INJECTION INTRAMUSCULAR; INTRAVENOUS
Status: COMPLETED | OUTPATIENT
Start: 2024-10-23 | End: 2024-10-24

## 2024-10-23 RX ORDER — FAMOTIDINE 10 MG/ML
10 INJECTION INTRAVENOUS
Status: COMPLETED | OUTPATIENT
Start: 2024-10-23 | End: 2024-10-24

## 2024-10-23 RX ORDER — HYDROMORPHONE HYDROCHLORIDE 1 MG/ML
0.5 INJECTION, SOLUTION INTRAMUSCULAR; INTRAVENOUS; SUBCUTANEOUS
Status: COMPLETED | OUTPATIENT
Start: 2024-10-23 | End: 2024-10-24

## 2024-10-23 RX ADMIN — SEVELAMER CARBONATE 800 MG: 800 TABLET, FILM COATED ORAL at 11:10

## 2024-10-23 RX ADMIN — HYDROMORPHONE HYDROCHLORIDE 0.5 MG: 1 INJECTION, SOLUTION INTRAMUSCULAR; INTRAVENOUS; SUBCUTANEOUS at 02:10

## 2024-10-23 RX ADMIN — PANTOPRAZOLE SODIUM 40 MG: 40 TABLET, DELAYED RELEASE ORAL at 08:10

## 2024-10-23 RX ADMIN — INSULIN ASPART 3 UNITS: 100 INJECTION, SOLUTION INTRAVENOUS; SUBCUTANEOUS at 08:10

## 2024-10-23 RX ADMIN — SEVELAMER CARBONATE 800 MG: 800 TABLET, FILM COATED ORAL at 08:10

## 2024-10-23 RX ADMIN — ACETAMINOPHEN 650 MG: 325 TABLET ORAL at 03:10

## 2024-10-23 RX ADMIN — OXYCODONE HYDROCHLORIDE 10 MG: 10 TABLET ORAL at 01:10

## 2024-10-23 RX ADMIN — OXYCODONE HYDROCHLORIDE 10 MG: 10 TABLET ORAL at 06:10

## 2024-10-23 RX ADMIN — LEVETIRACETAM 500 MG: 250 TABLET, FILM COATED ORAL at 08:10

## 2024-10-23 RX ADMIN — HYDRALAZINE HYDROCHLORIDE 50 MG: 25 TABLET ORAL at 05:10

## 2024-10-24 PROBLEM — E11.9 DIABETES: Status: RESOLVED | Noted: 2024-08-25 | Resolved: 2024-10-24

## 2024-10-24 PROBLEM — I16.1 HYPERTENSIVE EMERGENCY: Status: RESOLVED | Noted: 2022-06-01 | Resolved: 2024-10-24

## 2024-10-24 PROBLEM — I46.9 CARDIAC ARREST: Status: RESOLVED | Noted: 2023-10-03 | Resolved: 2024-10-24

## 2024-10-24 PROBLEM — I15.1 HYPERTENSION SECONDARY TO OTHER RENAL DISORDERS: Status: RESOLVED | Noted: 2024-08-25 | Resolved: 2024-10-24

## 2024-10-24 PROBLEM — R10.9 CHRONIC ABDOMINAL PAIN: Status: RESOLVED | Noted: 2024-09-19 | Resolved: 2024-10-24

## 2024-10-24 PROBLEM — D64.9 ANEMIA: Status: RESOLVED | Noted: 2024-06-22 | Resolved: 2024-10-24

## 2024-10-24 PROBLEM — R07.9 CHEST PAIN: Status: RESOLVED | Noted: 2022-08-04 | Resolved: 2024-10-24

## 2024-10-24 PROBLEM — Z99.2 ESRD (END STAGE RENAL DISEASE) ON DIALYSIS: Status: RESOLVED | Noted: 2024-09-10 | Resolved: 2024-10-24

## 2024-10-24 PROBLEM — G89.29 CHRONIC ABDOMINAL PAIN: Status: RESOLVED | Noted: 2024-09-19 | Resolved: 2024-10-24

## 2024-10-24 PROBLEM — G89.29 CHRONIC EPIGASTRIC PAIN: Status: ACTIVE | Noted: 2022-11-11

## 2024-10-24 PROBLEM — N18.6 ESRD (END STAGE RENAL DISEASE) ON DIALYSIS: Status: RESOLVED | Noted: 2024-09-10 | Resolved: 2024-10-24

## 2024-10-24 PROBLEM — E87.29 HIGH ANION GAP METABOLIC ACIDOSIS: Status: RESOLVED | Noted: 2021-04-23 | Resolved: 2024-10-24

## 2024-10-24 PROBLEM — Z91.158 DIALYSIS PATIENT, NONCOMPLIANT: Status: RESOLVED | Noted: 2024-06-22 | Resolved: 2024-10-24

## 2024-10-24 PROBLEM — T82.42XA DISPLACEMENT OF VASCULAR DIALYSIS CATHETER: Status: RESOLVED | Noted: 2023-06-17 | Resolved: 2024-10-24

## 2024-10-24 PROBLEM — N39.0 UTI (URINARY TRACT INFECTION): Status: RESOLVED | Noted: 2024-10-22 | Resolved: 2024-10-24

## 2024-10-24 LAB
ALBUMIN SERPL BCP-MCNC: 3.6 G/DL (ref 3.5–5.2)
ALLENS TEST: ABNORMAL
ALLENS TEST: ABNORMAL
ALP SERPL-CCNC: 160 U/L (ref 40–150)
ALT SERPL W/O P-5'-P-CCNC: 50 U/L (ref 10–44)
ANION GAP SERPL CALC-SCNC: 11 MMOL/L (ref 8–16)
ANION GAP SERPL CALC-SCNC: 16 MMOL/L (ref 8–16)
AST SERPL-CCNC: 34 U/L (ref 10–40)
B-OH-BUTYR BLD STRIP-SCNC: 1.3 MMOL/L (ref 0–0.5)
BASOPHILS # BLD AUTO: 0.02 K/UL (ref 0–0.2)
BASOPHILS # BLD AUTO: 0.03 K/UL (ref 0–0.2)
BASOPHILS NFR BLD: 0.4 % (ref 0–1.9)
BASOPHILS NFR BLD: 0.8 % (ref 0–1.9)
BILIRUB SERPL-MCNC: 1.5 MG/DL (ref 0.1–1)
BUN SERPL-MCNC: 15 MG/DL (ref 6–20)
BUN SERPL-MCNC: 20 MG/DL (ref 6–20)
CALCIUM SERPL-MCNC: 10 MG/DL (ref 8.7–10.5)
CALCIUM SERPL-MCNC: 9.2 MG/DL (ref 8.7–10.5)
CHLORIDE SERPL-SCNC: 104 MMOL/L (ref 95–110)
CHLORIDE SERPL-SCNC: 105 MMOL/L (ref 95–110)
CO2 SERPL-SCNC: 19 MMOL/L (ref 23–29)
CO2 SERPL-SCNC: 21 MMOL/L (ref 23–29)
CREAT SERPL-MCNC: 3.7 MG/DL (ref 0.5–1.4)
CREAT SERPL-MCNC: 4.2 MG/DL (ref 0.5–1.4)
DELSYS: ABNORMAL
DELSYS: ABNORMAL
DIFFERENTIAL METHOD BLD: ABNORMAL
DIFFERENTIAL METHOD BLD: ABNORMAL
EOSINOPHIL # BLD AUTO: 0.1 K/UL (ref 0–0.5)
EOSINOPHIL # BLD AUTO: 0.1 K/UL (ref 0–0.5)
EOSINOPHIL NFR BLD: 1.3 % (ref 0–8)
EOSINOPHIL NFR BLD: 1.6 % (ref 0–8)
ERYTHROCYTE [DISTWIDTH] IN BLOOD BY AUTOMATED COUNT: 16.9 % (ref 11.5–14.5)
ERYTHROCYTE [DISTWIDTH] IN BLOOD BY AUTOMATED COUNT: 17.1 % (ref 11.5–14.5)
EST. GFR  (NO RACE VARIABLE): 13 ML/MIN/1.73 M^2
EST. GFR  (NO RACE VARIABLE): 16 ML/MIN/1.73 M^2
FIO2: 21
FIO2: 21
GLUCOSE SERPL-MCNC: 189 MG/DL (ref 70–110)
GLUCOSE SERPL-MCNC: 228 MG/DL (ref 70–110)
HCG INTACT+B SERPL-ACNC: <1.2 MIU/ML
HCO3 UR-SCNC: 23.3 MMOL/L (ref 24–28)
HCO3 UR-SCNC: 23.3 MMOL/L (ref 24–28)
HCT VFR BLD AUTO: 25 % (ref 37–48.5)
HCT VFR BLD AUTO: 28.2 % (ref 37–48.5)
HGB BLD-MCNC: 8.2 G/DL (ref 12–16)
HGB BLD-MCNC: 9.4 G/DL (ref 12–16)
IMM GRANULOCYTES # BLD AUTO: 0.01 K/UL (ref 0–0.04)
IMM GRANULOCYTES # BLD AUTO: 0.04 K/UL (ref 0–0.04)
IMM GRANULOCYTES NFR BLD AUTO: 0.3 % (ref 0–0.5)
IMM GRANULOCYTES NFR BLD AUTO: 0.7 % (ref 0–0.5)
LACTATE SERPL-SCNC: 2 MMOL/L (ref 0.5–2.2)
LIPASE SERPL-CCNC: 13 U/L (ref 4–60)
LYMPHOCYTES # BLD AUTO: 0.4 K/UL (ref 1–4.8)
LYMPHOCYTES # BLD AUTO: 0.6 K/UL (ref 1–4.8)
LYMPHOCYTES NFR BLD: 10.3 % (ref 18–48)
LYMPHOCYTES NFR BLD: 9.8 % (ref 18–48)
MCH RBC QN AUTO: 29.5 PG (ref 27–31)
MCH RBC QN AUTO: 30 PG (ref 27–31)
MCHC RBC AUTO-ENTMCNC: 32.8 G/DL (ref 32–36)
MCHC RBC AUTO-ENTMCNC: 33.3 G/DL (ref 32–36)
MCV RBC AUTO: 90 FL (ref 82–98)
MCV RBC AUTO: 90 FL (ref 82–98)
MODE: ABNORMAL
MODE: ABNORMAL
MONOCYTES # BLD AUTO: 0.3 K/UL (ref 0.3–1)
MONOCYTES # BLD AUTO: 0.4 K/UL (ref 0.3–1)
MONOCYTES NFR BLD: 7.7 % (ref 4–15)
MONOCYTES NFR BLD: 7.8 % (ref 4–15)
NEUTROPHILS # BLD AUTO: 3.2 K/UL (ref 1.8–7.7)
NEUTROPHILS # BLD AUTO: 4.6 K/UL (ref 1.8–7.7)
NEUTROPHILS NFR BLD: 79.5 % (ref 38–73)
NEUTROPHILS NFR BLD: 79.8 % (ref 38–73)
NRBC BLD-RTO: 0 /100 WBC
NRBC BLD-RTO: 0 /100 WBC
PCO2 BLDA: 37.7 MMHG (ref 35–45)
PCO2 BLDA: 37.7 MMHG (ref 35–45)
PH SMN: 7.4 [PH] (ref 7.35–7.45)
PH SMN: 7.4 [PH] (ref 7.35–7.45)
PLATELET # BLD AUTO: 106 K/UL (ref 150–450)
PLATELET # BLD AUTO: 122 K/UL (ref 150–450)
PMV BLD AUTO: 11.5 FL (ref 9.2–12.9)
PMV BLD AUTO: 9.7 FL (ref 9.2–12.9)
PO2 BLDA: 51 MMHG (ref 50–70)
PO2 BLDA: 51 MMHG (ref 50–70)
POC BE: -2 MMOL/L
POC BE: -2 MMOL/L
POC SATURATED O2: 86 % (ref 95–100)
POC SATURATED O2: 86 % (ref 95–100)
POC TCO2: 24 MMOL/L (ref 23–27)
POC TCO2: 24 MMOL/L (ref 23–27)
POCT GLUCOSE: 142 MG/DL (ref 70–110)
POCT GLUCOSE: 183 MG/DL (ref 70–110)
POCT GLUCOSE: 237 MG/DL (ref 70–110)
POCT GLUCOSE: 293 MG/DL (ref 70–110)
POTASSIUM SERPL-SCNC: 4.5 MMOL/L (ref 3.5–5.1)
POTASSIUM SERPL-SCNC: 4.7 MMOL/L (ref 3.5–5.1)
PROT SERPL-MCNC: 7.5 G/DL (ref 6–8.4)
RBC # BLD AUTO: 2.78 M/UL (ref 4–5.4)
RBC # BLD AUTO: 3.13 M/UL (ref 4–5.4)
SAMPLE: ABNORMAL
SAMPLE: ABNORMAL
SITE: ABNORMAL
SITE: ABNORMAL
SODIUM SERPL-SCNC: 137 MMOL/L (ref 136–145)
SODIUM SERPL-SCNC: 139 MMOL/L (ref 136–145)
SP02: 97
SP02: 97
WBC # BLD AUTO: 4 K/UL (ref 3.9–12.7)
WBC # BLD AUTO: 5.71 K/UL (ref 3.9–12.7)

## 2024-10-24 PROCEDURE — 96375 TX/PRO/DX INJ NEW DRUG ADDON: CPT

## 2024-10-24 PROCEDURE — G0257 UNSCHED DIALYSIS ESRD PT HOS: HCPCS

## 2024-10-24 PROCEDURE — 80048 BASIC METABOLIC PNL TOTAL CA: CPT

## 2024-10-24 PROCEDURE — 25000003 PHARM REV CODE 250: Performed by: STUDENT IN AN ORGANIZED HEALTH CARE EDUCATION/TRAINING PROGRAM

## 2024-10-24 PROCEDURE — 63600175 PHARM REV CODE 636 W HCPCS: Performed by: STUDENT IN AN ORGANIZED HEALTH CARE EDUCATION/TRAINING PROGRAM

## 2024-10-24 PROCEDURE — G0378 HOSPITAL OBSERVATION PER HR: HCPCS

## 2024-10-24 PROCEDURE — 63600175 PHARM REV CODE 636 W HCPCS

## 2024-10-24 PROCEDURE — 36415 COLL VENOUS BLD VENIPUNCTURE: CPT | Performed by: STUDENT IN AN ORGANIZED HEALTH CARE EDUCATION/TRAINING PROGRAM

## 2024-10-24 PROCEDURE — 96376 TX/PRO/DX INJ SAME DRUG ADON: CPT

## 2024-10-24 PROCEDURE — 99900035 HC TECH TIME PER 15 MIN (STAT)

## 2024-10-24 PROCEDURE — 96374 THER/PROPH/DIAG INJ IV PUSH: CPT

## 2024-10-24 PROCEDURE — 25000003 PHARM REV CODE 250

## 2024-10-24 PROCEDURE — 82962 GLUCOSE BLOOD TEST: CPT

## 2024-10-24 PROCEDURE — 27000221 HC OXYGEN, UP TO 24 HOURS

## 2024-10-24 PROCEDURE — 94761 N-INVAS EAR/PLS OXIMETRY MLT: CPT

## 2024-10-24 PROCEDURE — 90935 HEMODIALYSIS ONE EVALUATION: CPT

## 2024-10-24 PROCEDURE — 96372 THER/PROPH/DIAG INJ SC/IM: CPT

## 2024-10-24 PROCEDURE — 85025 COMPLETE CBC W/AUTO DIFF WBC: CPT

## 2024-10-24 PROCEDURE — 36415 COLL VENOUS BLD VENIPUNCTURE: CPT

## 2024-10-24 PROCEDURE — 96375 TX/PRO/DX INJ NEW DRUG ADDON: CPT | Mod: 59

## 2024-10-24 RX ORDER — IBUPROFEN 200 MG
24 TABLET ORAL
Status: DISCONTINUED | OUTPATIENT
Start: 2024-10-24 | End: 2024-10-25 | Stop reason: HOSPADM

## 2024-10-24 RX ORDER — ALUMINUM HYDROXIDE, MAGNESIUM HYDROXIDE, AND SIMETHICONE 1200; 120; 1200 MG/30ML; MG/30ML; MG/30ML
30 SUSPENSION ORAL 4 TIMES DAILY PRN
Status: DISCONTINUED | OUTPATIENT
Start: 2024-10-24 | End: 2024-10-25 | Stop reason: HOSPADM

## 2024-10-24 RX ORDER — HYDROCODONE BITARTRATE AND ACETAMINOPHEN 5; 325 MG/1; MG/1
1 TABLET ORAL EVERY 4 HOURS PRN
Status: DISCONTINUED | OUTPATIENT
Start: 2024-10-24 | End: 2024-10-25 | Stop reason: HOSPADM

## 2024-10-24 RX ORDER — GLUCAGON 1 MG
1 KIT INJECTION
Status: DISCONTINUED | OUTPATIENT
Start: 2024-10-24 | End: 2024-10-25 | Stop reason: HOSPADM

## 2024-10-24 RX ORDER — HYDRALAZINE HYDROCHLORIDE 20 MG/ML
10 INJECTION INTRAMUSCULAR; INTRAVENOUS EVERY 6 HOURS PRN
Status: DISCONTINUED | OUTPATIENT
Start: 2024-10-24 | End: 2024-10-25 | Stop reason: HOSPADM

## 2024-10-24 RX ORDER — GABAPENTIN 300 MG/1
300 CAPSULE ORAL 2 TIMES DAILY
Status: DISCONTINUED | OUTPATIENT
Start: 2024-10-24 | End: 2024-10-25 | Stop reason: HOSPADM

## 2024-10-24 RX ORDER — BRIMONIDINE TARTRATE 1.5 MG/ML
1 SOLUTION/ DROPS OPHTHALMIC 3 TIMES DAILY
Status: DISCONTINUED | OUTPATIENT
Start: 2024-10-24 | End: 2024-10-25 | Stop reason: HOSPADM

## 2024-10-24 RX ORDER — ONDANSETRON HYDROCHLORIDE 2 MG/ML
4 INJECTION, SOLUTION INTRAVENOUS EVERY 6 HOURS PRN
Status: DISCONTINUED | OUTPATIENT
Start: 2024-10-24 | End: 2024-10-25 | Stop reason: HOSPADM

## 2024-10-24 RX ORDER — HYDROMORPHONE HYDROCHLORIDE 1 MG/ML
1 INJECTION, SOLUTION INTRAMUSCULAR; INTRAVENOUS; SUBCUTANEOUS EVERY 4 HOURS PRN
Status: DISCONTINUED | OUTPATIENT
Start: 2024-10-24 | End: 2024-10-25 | Stop reason: HOSPADM

## 2024-10-24 RX ORDER — PANTOPRAZOLE SODIUM 40 MG/1
40 TABLET, DELAYED RELEASE ORAL DAILY
Status: DISCONTINUED | OUTPATIENT
Start: 2024-10-24 | End: 2024-10-25 | Stop reason: HOSPADM

## 2024-10-24 RX ORDER — INSULIN ASPART 100 [IU]/ML
0-5 INJECTION, SOLUTION INTRAVENOUS; SUBCUTANEOUS
Status: DISCONTINUED | OUTPATIENT
Start: 2024-10-24 | End: 2024-10-25 | Stop reason: HOSPADM

## 2024-10-24 RX ORDER — LEVETIRACETAM 500 MG/1
500 TABLET ORAL 2 TIMES DAILY
Status: DISCONTINUED | OUTPATIENT
Start: 2024-10-24 | End: 2024-10-25 | Stop reason: HOSPADM

## 2024-10-24 RX ORDER — IBUPROFEN 200 MG
16 TABLET ORAL
Status: DISCONTINUED | OUTPATIENT
Start: 2024-10-24 | End: 2024-10-25 | Stop reason: HOSPADM

## 2024-10-24 RX ORDER — ACETAMINOPHEN 325 MG/1
650 TABLET ORAL EVERY 4 HOURS PRN
Status: DISCONTINUED | OUTPATIENT
Start: 2024-10-24 | End: 2024-10-25 | Stop reason: HOSPADM

## 2024-10-24 RX ORDER — ATROPINE SULFATE 10 MG/ML
1 SOLUTION/ DROPS OPHTHALMIC 2 TIMES DAILY
Status: DISCONTINUED | OUTPATIENT
Start: 2024-10-24 | End: 2024-10-25 | Stop reason: HOSPADM

## 2024-10-24 RX ORDER — TALC
9 POWDER (GRAM) TOPICAL NIGHTLY PRN
Status: DISCONTINUED | OUTPATIENT
Start: 2024-10-24 | End: 2024-10-25 | Stop reason: HOSPADM

## 2024-10-24 RX ORDER — HYDROMORPHONE HYDROCHLORIDE 1 MG/ML
0.5 INJECTION, SOLUTION INTRAMUSCULAR; INTRAVENOUS; SUBCUTANEOUS
Status: COMPLETED | OUTPATIENT
Start: 2024-10-24 | End: 2024-10-24

## 2024-10-24 RX ORDER — TIMOLOL MALEATE 5 MG/ML
1 SOLUTION/ DROPS OPHTHALMIC 2 TIMES DAILY
Status: DISCONTINUED | OUTPATIENT
Start: 2024-10-24 | End: 2024-10-25 | Stop reason: HOSPADM

## 2024-10-24 RX ORDER — SODIUM BICARBONATE 650 MG/1
650 TABLET ORAL 3 TIMES DAILY
Status: DISCONTINUED | OUTPATIENT
Start: 2024-10-24 | End: 2024-10-25 | Stop reason: HOSPADM

## 2024-10-24 RX ORDER — HYDRALAZINE HYDROCHLORIDE 25 MG/1
50 TABLET, FILM COATED ORAL EVERY 8 HOURS
Status: DISCONTINUED | OUTPATIENT
Start: 2024-10-24 | End: 2024-10-25 | Stop reason: HOSPADM

## 2024-10-24 RX ORDER — AMOXICILLIN 250 MG
1 CAPSULE ORAL 2 TIMES DAILY PRN
Status: DISCONTINUED | OUTPATIENT
Start: 2024-10-24 | End: 2024-10-25 | Stop reason: HOSPADM

## 2024-10-24 RX ORDER — IPRATROPIUM BROMIDE AND ALBUTEROL SULFATE 2.5; .5 MG/3ML; MG/3ML
3 SOLUTION RESPIRATORY (INHALATION) EVERY 6 HOURS PRN
Status: DISCONTINUED | OUTPATIENT
Start: 2024-10-24 | End: 2024-10-25 | Stop reason: HOSPADM

## 2024-10-24 RX ORDER — SODIUM CHLORIDE 0.9 % (FLUSH) 0.9 %
10 SYRINGE (ML) INJECTION EVERY 12 HOURS PRN
Status: DISCONTINUED | OUTPATIENT
Start: 2024-10-24 | End: 2024-10-25 | Stop reason: HOSPADM

## 2024-10-24 RX ORDER — PREDNISOLONE ACETATE 10 MG/ML
1 SUSPENSION/ DROPS OPHTHALMIC 2 TIMES DAILY
Status: DISCONTINUED | OUTPATIENT
Start: 2024-10-24 | End: 2024-10-25 | Stop reason: HOSPADM

## 2024-10-24 RX ORDER — NALOXONE HCL 0.4 MG/ML
0.02 VIAL (ML) INJECTION
Status: DISCONTINUED | OUTPATIENT
Start: 2024-10-24 | End: 2024-10-25 | Stop reason: HOSPADM

## 2024-10-24 RX ORDER — CEFTRIAXONE 1 G/1
1 INJECTION, POWDER, FOR SOLUTION INTRAMUSCULAR; INTRAVENOUS
Status: DISCONTINUED | OUTPATIENT
Start: 2024-10-24 | End: 2024-10-25 | Stop reason: HOSPADM

## 2024-10-24 RX ORDER — SEVELAMER CARBONATE 800 MG/1
800 TABLET, FILM COATED ORAL
Status: DISCONTINUED | OUTPATIENT
Start: 2024-10-24 | End: 2024-10-25 | Stop reason: HOSPADM

## 2024-10-24 RX ORDER — PROCHLORPERAZINE EDISYLATE 5 MG/ML
5 INJECTION INTRAMUSCULAR; INTRAVENOUS EVERY 6 HOURS PRN
Status: DISCONTINUED | OUTPATIENT
Start: 2024-10-24 | End: 2024-10-25 | Stop reason: HOSPADM

## 2024-10-24 RX ADMIN — SODIUM BICARBONATE 650 MG TABLET 650 MG: at 02:10

## 2024-10-24 RX ADMIN — PROCHLORPERAZINE EDISYLATE 5 MG: 5 INJECTION INTRAMUSCULAR; INTRAVENOUS at 12:10

## 2024-10-24 RX ADMIN — CEFTRIAXONE 1 G: 1 INJECTION, POWDER, FOR SOLUTION INTRAMUSCULAR; INTRAVENOUS at 05:10

## 2024-10-24 RX ADMIN — HYDROMORPHONE HYDROCHLORIDE 1 MG: 1 INJECTION, SOLUTION INTRAMUSCULAR; INTRAVENOUS; SUBCUTANEOUS at 07:10

## 2024-10-24 RX ADMIN — ONDANSETRON 4 MG: 2 INJECTION INTRAMUSCULAR; INTRAVENOUS at 07:10

## 2024-10-24 RX ADMIN — HYDROMORPHONE HYDROCHLORIDE 1 MG: 1 INJECTION, SOLUTION INTRAMUSCULAR; INTRAVENOUS; SUBCUTANEOUS at 01:10

## 2024-10-24 RX ADMIN — GABAPENTIN 300 MG: 300 CAPSULE ORAL at 08:10

## 2024-10-24 RX ADMIN — BRIMONIDINE TARTRATE 1 DROP: 1.5 SOLUTION OPHTHALMIC at 02:10

## 2024-10-24 RX ADMIN — TIMOLOL MALEATE 1 DROP: 5 SOLUTION/ DROPS OPHTHALMIC at 08:10

## 2024-10-24 RX ADMIN — HYDROMORPHONE HYDROCHLORIDE 0.5 MG: 1 INJECTION, SOLUTION INTRAMUSCULAR; INTRAVENOUS; SUBCUTANEOUS at 04:10

## 2024-10-24 RX ADMIN — HYDRALAZINE HYDROCHLORIDE 50 MG: 25 TABLET ORAL at 08:10

## 2024-10-24 RX ADMIN — PREDNISOLONE ACETATE 1 DROP: 10 SUSPENSION/ DROPS OPHTHALMIC at 08:10

## 2024-10-24 RX ADMIN — BRIMONIDINE TARTRATE 1 DROP: 1.5 SOLUTION OPHTHALMIC at 08:10

## 2024-10-24 RX ADMIN — SODIUM BICARBONATE 650 MG TABLET 650 MG: at 08:10

## 2024-10-24 RX ADMIN — LEVETIRACETAM 500 MG: 500 TABLET, FILM COATED ORAL at 08:10

## 2024-10-24 RX ADMIN — ATROPINE SULFATE 1 DROP: 10 SOLUTION OPHTHALMIC at 08:10

## 2024-10-24 RX ADMIN — GABAPENTIN 300 MG: 300 CAPSULE ORAL at 10:10

## 2024-10-24 RX ADMIN — HYDRALAZINE HYDROCHLORIDE 50 MG: 25 TABLET ORAL at 05:10

## 2024-10-24 RX ADMIN — HYDROCODONE BITARTRATE AND ACETAMINOPHEN 1 TABLET: 5; 325 TABLET ORAL at 10:10

## 2024-10-24 RX ADMIN — SODIUM BICARBONATE 650 MG TABLET 650 MG: at 10:10

## 2024-10-24 RX ADMIN — HYDROMORPHONE HYDROCHLORIDE 0.5 MG: 1 INJECTION, SOLUTION INTRAMUSCULAR; INTRAVENOUS; SUBCUTANEOUS at 12:10

## 2024-10-24 RX ADMIN — HYDRALAZINE HYDROCHLORIDE 50 MG: 25 TABLET ORAL at 02:10

## 2024-10-24 RX ADMIN — HYDROMORPHONE HYDROCHLORIDE 1 MG: 1 INJECTION, SOLUTION INTRAMUSCULAR; INTRAVENOUS; SUBCUTANEOUS at 08:10

## 2024-10-24 RX ADMIN — LEVETIRACETAM 500 MG: 500 TABLET, FILM COATED ORAL at 10:10

## 2024-10-24 RX ADMIN — Medication 10 MG: at 12:10

## 2024-10-24 RX ADMIN — HYDRALAZINE HYDROCHLORIDE 10 MG: 20 INJECTION INTRAMUSCULAR; INTRAVENOUS at 10:10

## 2024-10-24 RX ADMIN — PROCHLORPERAZINE EDISYLATE 5 MG: 5 INJECTION INTRAMUSCULAR; INTRAVENOUS at 10:10

## 2024-10-24 RX ADMIN — PANTOPRAZOLE SODIUM 40 MG: 40 TABLET, DELAYED RELEASE ORAL at 10:10

## 2024-10-24 RX ADMIN — INSULIN ASPART 2 UNITS: 100 INJECTION, SOLUTION INTRAVENOUS; SUBCUTANEOUS at 07:10

## 2024-10-24 RX ADMIN — DIPHENHYDRAMINE HYDROCHLORIDE 12.5 MG: 50 INJECTION INTRAMUSCULAR; INTRAVENOUS at 12:10

## 2024-10-24 RX ADMIN — APIXABAN 5 MG: 2.5 TABLET, FILM COATED ORAL at 08:10

## 2024-10-25 VITALS
HEART RATE: 90 BPM | DIASTOLIC BLOOD PRESSURE: 59 MMHG | TEMPERATURE: 98 F | WEIGHT: 116.38 LBS | BODY MASS INDEX: 21.42 KG/M2 | OXYGEN SATURATION: 92 % | RESPIRATION RATE: 20 BRPM | HEIGHT: 62 IN | SYSTOLIC BLOOD PRESSURE: 128 MMHG

## 2024-10-25 LAB
ANION GAP SERPL CALC-SCNC: 15 MMOL/L (ref 8–16)
BASOPHILS # BLD AUTO: 0.02 K/UL (ref 0–0.2)
BASOPHILS NFR BLD: 0.7 % (ref 0–1.9)
BUN SERPL-MCNC: 22 MG/DL (ref 6–20)
CALCIUM SERPL-MCNC: 9.3 MG/DL (ref 8.7–10.5)
CHLORIDE SERPL-SCNC: 98 MMOL/L (ref 95–110)
CO2 SERPL-SCNC: 25 MMOL/L (ref 23–29)
CREAT SERPL-MCNC: 3.9 MG/DL (ref 0.5–1.4)
DIFFERENTIAL METHOD BLD: ABNORMAL
EOSINOPHIL # BLD AUTO: 0.1 K/UL (ref 0–0.5)
EOSINOPHIL NFR BLD: 2.2 % (ref 0–8)
ERYTHROCYTE [DISTWIDTH] IN BLOOD BY AUTOMATED COUNT: 16.8 % (ref 11.5–14.5)
EST. GFR  (NO RACE VARIABLE): 15 ML/MIN/1.73 M^2
GLUCOSE SERPL-MCNC: 259 MG/DL (ref 70–110)
HCT VFR BLD AUTO: 27.1 % (ref 37–48.5)
HGB BLD-MCNC: 8.6 G/DL (ref 12–16)
IMM GRANULOCYTES # BLD AUTO: 0.01 K/UL (ref 0–0.04)
IMM GRANULOCYTES NFR BLD AUTO: 0.4 % (ref 0–0.5)
LYMPHOCYTES # BLD AUTO: 0.6 K/UL (ref 1–4.8)
LYMPHOCYTES NFR BLD: 20.4 % (ref 18–48)
MAGNESIUM SERPL-MCNC: 2.3 MG/DL (ref 1.6–2.6)
MCH RBC QN AUTO: 28.8 PG (ref 27–31)
MCHC RBC AUTO-ENTMCNC: 31.7 G/DL (ref 32–36)
MCV RBC AUTO: 91 FL (ref 82–98)
MONOCYTES # BLD AUTO: 0.3 K/UL (ref 0.3–1)
MONOCYTES NFR BLD: 12.6 % (ref 4–15)
NEUTROPHILS # BLD AUTO: 1.7 K/UL (ref 1.8–7.7)
NEUTROPHILS NFR BLD: 63.7 % (ref 38–73)
NRBC BLD-RTO: 0 /100 WBC
PHOSPHATE SERPL-MCNC: 5 MG/DL (ref 2.7–4.5)
PLATELET # BLD AUTO: 125 K/UL (ref 150–450)
PMV BLD AUTO: 10.2 FL (ref 9.2–12.9)
POCT GLUCOSE: 347 MG/DL (ref 70–110)
POTASSIUM SERPL-SCNC: 3.8 MMOL/L (ref 3.5–5.1)
RBC # BLD AUTO: 2.99 M/UL (ref 4–5.4)
SODIUM SERPL-SCNC: 138 MMOL/L (ref 136–145)
WBC # BLD AUTO: 2.69 K/UL (ref 3.9–12.7)

## 2024-10-25 PROCEDURE — 85025 COMPLETE CBC W/AUTO DIFF WBC: CPT | Performed by: STUDENT IN AN ORGANIZED HEALTH CARE EDUCATION/TRAINING PROGRAM

## 2024-10-25 PROCEDURE — 63600175 PHARM REV CODE 636 W HCPCS

## 2024-10-25 PROCEDURE — 84100 ASSAY OF PHOSPHORUS: CPT | Performed by: STUDENT IN AN ORGANIZED HEALTH CARE EDUCATION/TRAINING PROGRAM

## 2024-10-25 PROCEDURE — 94760 N-INVAS EAR/PLS OXIMETRY 1: CPT

## 2024-10-25 PROCEDURE — 94761 N-INVAS EAR/PLS OXIMETRY MLT: CPT

## 2024-10-25 PROCEDURE — 99900035 HC TECH TIME PER 15 MIN (STAT)

## 2024-10-25 PROCEDURE — 96376 TX/PRO/DX INJ SAME DRUG ADON: CPT | Mod: 59

## 2024-10-25 PROCEDURE — G0378 HOSPITAL OBSERVATION PER HR: HCPCS

## 2024-10-25 PROCEDURE — 25000003 PHARM REV CODE 250

## 2024-10-25 PROCEDURE — 83735 ASSAY OF MAGNESIUM: CPT | Performed by: STUDENT IN AN ORGANIZED HEALTH CARE EDUCATION/TRAINING PROGRAM

## 2024-10-25 PROCEDURE — 80048 BASIC METABOLIC PNL TOTAL CA: CPT | Performed by: STUDENT IN AN ORGANIZED HEALTH CARE EDUCATION/TRAINING PROGRAM

## 2024-10-25 PROCEDURE — 36415 COLL VENOUS BLD VENIPUNCTURE: CPT | Performed by: STUDENT IN AN ORGANIZED HEALTH CARE EDUCATION/TRAINING PROGRAM

## 2024-10-25 RX ADMIN — HYDROMORPHONE HYDROCHLORIDE 1 MG: 1 INJECTION, SOLUTION INTRAMUSCULAR; INTRAVENOUS; SUBCUTANEOUS at 02:10

## 2024-10-25 RX ADMIN — HYDROMORPHONE HYDROCHLORIDE 1 MG: 1 INJECTION, SOLUTION INTRAMUSCULAR; INTRAVENOUS; SUBCUTANEOUS at 06:10

## 2024-10-25 RX ADMIN — CEFTRIAXONE 1 G: 1 INJECTION, POWDER, FOR SOLUTION INTRAMUSCULAR; INTRAVENOUS at 05:10

## 2024-10-25 RX ADMIN — HYDRALAZINE HYDROCHLORIDE 50 MG: 25 TABLET ORAL at 05:10

## 2024-10-28 ENCOUNTER — TELEPHONE (OUTPATIENT)
Facility: CLINIC | Age: 34
End: 2024-10-28
Payer: MEDICAID

## 2024-10-30 LAB
OHS QRS DURATION: 80 MS
OHS QRS DURATION: 80 MS
OHS QTC CALCULATION: 495 MS
OHS QTC CALCULATION: 515 MS

## 2024-11-01 ENCOUNTER — PATIENT MESSAGE (OUTPATIENT)
Dept: CASE MANAGEMENT | Facility: HOSPITAL | Age: 34
End: 2024-11-01

## 2024-11-07 ENCOUNTER — PATIENT OUTREACH (OUTPATIENT)
Dept: ADMINISTRATIVE | Facility: OTHER | Age: 34
End: 2024-11-07
Payer: MEDICAID

## 2024-11-07 ENCOUNTER — HOSPITAL ENCOUNTER (EMERGENCY)
Facility: HOSPITAL | Age: 34
Discharge: HOME | End: 2024-11-07
Attending: EMERGENCY MEDICINE
Payer: MEDICAID

## 2024-11-07 VITALS
RESPIRATION RATE: 18 BRPM | BODY MASS INDEX: 20.73 KG/M2 | HEIGHT: 63 IN | TEMPERATURE: 98 F | HEART RATE: 84 BPM | OXYGEN SATURATION: 97 % | WEIGHT: 117 LBS | SYSTOLIC BLOOD PRESSURE: 153 MMHG | DIASTOLIC BLOOD PRESSURE: 92 MMHG

## 2024-11-07 DIAGNOSIS — R52 GENERALIZED BODY ACHES: ICD-10-CM

## 2024-11-07 DIAGNOSIS — N18.6 ESRD (END STAGE RENAL DISEASE) ON DIALYSIS: Primary | ICD-10-CM

## 2024-11-07 DIAGNOSIS — Z99.2 ESRD (END STAGE RENAL DISEASE) ON DIALYSIS: Primary | ICD-10-CM

## 2024-11-07 LAB
ALBUMIN SERPL BCP-MCNC: 3.5 G/DL (ref 3.5–5.2)
ALLENS TEST: ABNORMAL
ALP SERPL-CCNC: 177 U/L (ref 40–150)
ALT SERPL W/O P-5'-P-CCNC: 39 U/L (ref 10–44)
ANION GAP SERPL CALC-SCNC: 15 MMOL/L (ref 8–16)
ANION GAP SERPL CALC-SCNC: 19 MMOL/L (ref 8–16)
AST SERPL-CCNC: 38 U/L (ref 10–40)
B-OH-BUTYR BLD STRIP-SCNC: 0 MMOL/L (ref 0–0.5)
BACTERIA #/AREA URNS HPF: NORMAL /HPF
BASOPHILS # BLD AUTO: 0.02 K/UL (ref 0–0.2)
BASOPHILS NFR BLD: 0.4 % (ref 0–1.9)
BILIRUB SERPL-MCNC: 1.2 MG/DL (ref 0.1–1)
BILIRUB UR QL STRIP: NEGATIVE
BUN SERPL-MCNC: 44 MG/DL (ref 6–20)
BUN SERPL-MCNC: 45 MG/DL (ref 6–20)
CALCIUM SERPL-MCNC: 9.1 MG/DL (ref 8.7–10.5)
CALCIUM SERPL-MCNC: 9.4 MG/DL (ref 8.7–10.5)
CHLORIDE SERPL-SCNC: 99 MMOL/L (ref 95–110)
CHLORIDE SERPL-SCNC: 99 MMOL/L (ref 95–110)
CLARITY UR: CLEAR
CO2 SERPL-SCNC: 24 MMOL/L (ref 23–29)
CO2 SERPL-SCNC: 26 MMOL/L (ref 23–29)
COLOR UR: YELLOW
CREAT SERPL-MCNC: 7.3 MG/DL (ref 0.5–1.4)
CREAT SERPL-MCNC: 7.4 MG/DL (ref 0.5–1.4)
DELSYS: ABNORMAL
DIFFERENTIAL METHOD BLD: ABNORMAL
EOSINOPHIL # BLD AUTO: 0.2 K/UL (ref 0–0.5)
EOSINOPHIL NFR BLD: 3.2 % (ref 0–8)
ERYTHROCYTE [DISTWIDTH] IN BLOOD BY AUTOMATED COUNT: 15.8 % (ref 11.5–14.5)
EST. GFR  (NO RACE VARIABLE): 7 ML/MIN/1.73 M^2
EST. GFR  (NO RACE VARIABLE): 7 ML/MIN/1.73 M^2
GLUCOSE SERPL-MCNC: 58 MG/DL (ref 70–110)
GLUCOSE SERPL-MCNC: 72 MG/DL (ref 70–110)
GLUCOSE UR QL STRIP: ABNORMAL
HCO3 UR-SCNC: 32.4 MMOL/L (ref 24–28)
HCT VFR BLD AUTO: 28.8 % (ref 37–48.5)
HGB BLD-MCNC: 9.6 G/DL (ref 12–16)
HGB UR QL STRIP: NEGATIVE
HYALINE CASTS #/AREA URNS LPF: 0 /LPF
IMM GRANULOCYTES # BLD AUTO: 0.02 K/UL (ref 0–0.04)
IMM GRANULOCYTES NFR BLD AUTO: 0.4 % (ref 0–0.5)
KETONES UR QL STRIP: NEGATIVE
LEUKOCYTE ESTERASE UR QL STRIP: NEGATIVE
LIPASE SERPL-CCNC: 35 U/L (ref 4–60)
LYMPHOCYTES # BLD AUTO: 0.7 K/UL (ref 1–4.8)
LYMPHOCYTES NFR BLD: 11.8 % (ref 18–48)
MCH RBC QN AUTO: 29.3 PG (ref 27–31)
MCHC RBC AUTO-ENTMCNC: 33.3 G/DL (ref 32–36)
MCV RBC AUTO: 88 FL (ref 82–98)
MICROSCOPIC COMMENT: NORMAL
MODE: ABNORMAL
MONOCYTES # BLD AUTO: 0.3 K/UL (ref 0.3–1)
MONOCYTES NFR BLD: 5.5 % (ref 4–15)
NEUTROPHILS # BLD AUTO: 4.5 K/UL (ref 1.8–7.7)
NEUTROPHILS NFR BLD: 78.7 % (ref 38–73)
NITRITE UR QL STRIP: NEGATIVE
NRBC BLD-RTO: 0 /100 WBC
PCO2 BLDA: 49.8 MMHG (ref 35–45)
PH SMN: 7.42 [PH] (ref 7.35–7.45)
PH UR STRIP: 8 [PH] (ref 5–8)
PLATELET # BLD AUTO: 112 K/UL (ref 150–450)
PMV BLD AUTO: 9.6 FL (ref 9.2–12.9)
PO2 BLDA: 40 MMHG (ref 40–60)
POC BE: 8 MMOL/L
POC SATURATED O2: 75 % (ref 95–100)
POC TCO2: 34 MMOL/L (ref 24–29)
POCT GLUCOSE: 115 MG/DL (ref 70–110)
POCT GLUCOSE: 99 MG/DL (ref 70–110)
POCT GLUCOSE: 99 MG/DL (ref 70–110)
POTASSIUM SERPL-SCNC: 4.6 MMOL/L (ref 3.5–5.1)
POTASSIUM SERPL-SCNC: 4.7 MMOL/L (ref 3.5–5.1)
PROT SERPL-MCNC: 7.1 G/DL (ref 6–8.4)
PROT UR QL STRIP: ABNORMAL
RBC # BLD AUTO: 3.28 M/UL (ref 4–5.4)
RBC #/AREA URNS HPF: 0 /HPF (ref 0–4)
SAMPLE: ABNORMAL
SITE: ABNORMAL
SODIUM SERPL-SCNC: 140 MMOL/L (ref 136–145)
SODIUM SERPL-SCNC: 142 MMOL/L (ref 136–145)
SP GR UR STRIP: 1.01 (ref 1–1.03)
SP02: 96
SQUAMOUS #/AREA URNS HPF: 0 /HPF
URN SPEC COLLECT METH UR: ABNORMAL
UROBILINOGEN UR STRIP-ACNC: NEGATIVE EU/DL
WBC # BLD AUTO: 5.68 K/UL (ref 3.9–12.7)
WBC #/AREA URNS HPF: 2 /HPF (ref 0–5)
YEAST URNS QL MICRO: NORMAL

## 2024-11-07 PROCEDURE — 36415 COLL VENOUS BLD VENIPUNCTURE: CPT | Performed by: PHYSICIAN ASSISTANT

## 2024-11-07 PROCEDURE — 96361 HYDRATE IV INFUSION ADD-ON: CPT

## 2024-11-07 PROCEDURE — S5010 5% DEXTROSE AND 0.45% SALINE: HCPCS | Performed by: PHYSICIAN ASSISTANT

## 2024-11-07 PROCEDURE — 80048 BASIC METABOLIC PNL TOTAL CA: CPT | Performed by: PHYSICIAN ASSISTANT

## 2024-11-07 PROCEDURE — 85025 COMPLETE CBC W/AUTO DIFF WBC: CPT | Performed by: PHYSICIAN ASSISTANT

## 2024-11-07 PROCEDURE — 82962 GLUCOSE BLOOD TEST: CPT

## 2024-11-07 PROCEDURE — 82803 BLOOD GASES ANY COMBINATION: CPT

## 2024-11-07 PROCEDURE — 83735 ASSAY OF MAGNESIUM: CPT | Performed by: PHYSICIAN ASSISTANT

## 2024-11-07 PROCEDURE — 99900035 HC TECH TIME PER 15 MIN (STAT)

## 2024-11-07 PROCEDURE — 83690 ASSAY OF LIPASE: CPT | Performed by: PHYSICIAN ASSISTANT

## 2024-11-07 PROCEDURE — 63600175 PHARM REV CODE 636 W HCPCS: Performed by: PHYSICIAN ASSISTANT

## 2024-11-07 PROCEDURE — 81000 URINALYSIS NONAUTO W/SCOPE: CPT | Performed by: PHYSICIAN ASSISTANT

## 2024-11-07 PROCEDURE — 96374 THER/PROPH/DIAG INJ IV PUSH: CPT

## 2024-11-07 PROCEDURE — 94760 N-INVAS EAR/PLS OXIMETRY 1: CPT

## 2024-11-07 PROCEDURE — 82010 KETONE BODYS QUAN: CPT | Performed by: PHYSICIAN ASSISTANT

## 2024-11-07 PROCEDURE — 25000003 PHARM REV CODE 250: Performed by: PHYSICIAN ASSISTANT

## 2024-11-07 PROCEDURE — 99284 EMERGENCY DEPT VISIT MOD MDM: CPT | Mod: 25

## 2024-11-07 PROCEDURE — 80053 COMPREHEN METABOLIC PANEL: CPT | Performed by: PHYSICIAN ASSISTANT

## 2024-11-07 RX ORDER — SODIUM CHLORIDE 9 MG/ML
INJECTION, SOLUTION INTRAVENOUS ONCE
Status: CANCELLED | OUTPATIENT
Start: 2024-11-07 | End: 2024-11-07

## 2024-11-07 RX ORDER — HEPARIN SODIUM 5000 [USP'U]/ML
5000 INJECTION, SOLUTION INTRAVENOUS; SUBCUTANEOUS
Status: CANCELLED | OUTPATIENT
Start: 2024-11-07

## 2024-11-07 RX ORDER — MORPHINE SULFATE 2 MG/ML
6 INJECTION, SOLUTION INTRAMUSCULAR; INTRAVENOUS
Status: COMPLETED | OUTPATIENT
Start: 2024-11-07 | End: 2024-11-07

## 2024-11-07 RX ORDER — SODIUM CHLORIDE 9 MG/ML
INJECTION, SOLUTION INTRAVENOUS
Status: CANCELLED | OUTPATIENT
Start: 2024-11-07

## 2024-11-07 RX ORDER — DEXTROSE 50 % IN WATER (D50W) INTRAVENOUS SYRINGE
25
Status: DISCONTINUED | OUTPATIENT
Start: 2024-11-07 | End: 2024-11-07 | Stop reason: HOSPADM

## 2024-11-07 RX ORDER — MUPIROCIN 20 MG/G
OINTMENT TOPICAL 2 TIMES DAILY
Status: CANCELLED | OUTPATIENT
Start: 2024-11-07 | End: 2024-11-12

## 2024-11-07 RX ADMIN — DEXTROSE AND SODIUM CHLORIDE 500 ML: 5; 450 INJECTION, SOLUTION INTRAVENOUS at 02:11

## 2024-11-07 RX ADMIN — MORPHINE SULFATE 6 MG: 2 INJECTION, SOLUTION INTRAMUSCULAR; INTRAVENOUS at 01:11

## 2024-11-07 NOTE — PLAN OF CARE
Problem: Hemodialysis  Goal: Safe, Effective Therapy Delivery  Outcome: Progressing  Goal: Effective Tissue Perfusion  Outcome: Progressing  Goal: Absence of Infection Signs and Symptoms  Outcome: Progressing     Problem: Infection  Goal: Absence of Infection Signs and Symptoms  Outcome: Progressing     Problem: Adult Inpatient Plan of Care  Goal: Plan of Care Review  Outcome: Progressing  Goal: Patient-Specific Goal (Individualized)  Outcome: Progressing  Goal: Absence of Hospital-Acquired Illness or Injury  Outcome: Progressing  Goal: Optimal Comfort and Wellbeing  Outcome: Progressing  Goal: Readiness for Transition of Care  Outcome: Progressing     Problem: Diabetes Comorbidity  Goal: Blood Glucose Level Within Targeted Range  Outcome: Progressing      no

## 2024-11-08 LAB — MAGNESIUM SERPL-MCNC: 2.3 MG/DL (ref 1.6–2.6)

## 2024-11-08 NOTE — ED PROVIDER NOTES
"Encounter Date: 2024       History     Chief Complaint   Patient presents with    Pain     From dialysis per EMS for pain all over     Tabby Howard is a 34 y.o. female presenting via EMS from dialysis with complaints of pain "all over." She denies any active nausea or vomiting.  No abdominal pain.  She states the pain is all over.  She has not taken any medication for the pain.  She states they were able to complete about a 3rd of her regular dialysis.  No fever, no chills.  She has a past medical history of ESRD on hemodialysis (2022), Gastritis (2022), Gastroparesis (2022), Heart failure with preserved ejection fraction (2022), History of supraventricular tachycardia, Hyperkalemia (2022), Hypertensive emergency (2022), Sickle cell trait (2022), and Type 2 diabetes mellitus.      The history is provided by the patient.     Review of patient's allergies indicates:   Allergen Reactions    Droperidol Hives    Haldol [haloperidol lactate] Hives    Penicillins Hives    Droperidol (bulk) Anxiety     STATES "FREAKS OUT".  TOLERATES HALDOL PRIOR TO ARRIVAL AT ANOTHER FACILITY TODAY.      Past Medical History:   Diagnosis Date    ESRD on hemodialysis 2022    Gastritis 2022    EGD was 22    Gastroparesis 2022    has not had Emptying study    Heart failure with preserved ejection fraction 2022    EF 55% on 3/22    History of supraventricular tachycardia     Hyperkalemia 2022    Hypertensive emergency 2022    Sickle cell trait 2022    Type 2 diabetes mellitus      Past Surgical History:   Procedure Laterality Date     SECTION      x 3    COLONOSCOPY      COLONOSCOPY N/A 2022    Procedure: COLONOSCOPY;  Surgeon: Jagdeep Cedeno MD;  Location: [facility];  Service: Endoscopy;  Laterality: N/A;    DESTRUCTION, CILIARY BODY, USING LASER Left 2024    Procedure: Cyclophotocoagulation G-probe, retrobulbar chlorpromazine left " eye;  Surgeon: Fidel Wise MD;  Location: [address];  Service: Ophthalmology;  Laterality: Left;    ENDOSCOPIC ULTRASOUND OF UPPER GASTROINTESTINAL TRACT N/A 12/16/2023    Procedure: ULTRASOUND, UPPER GI TRACT, ENDOSCOPIC;  Surgeon: Micky Paredes III, MD;  Location: [facility];  Service: Endoscopy;  Laterality: N/A;    ESOPHAGOGASTRODUODENOSCOPY N/A 10/18/2019    Procedure: ESOPHAGOGASTRODUODENOSCOPY (EGD);  Surgeon: Gianluca Mendez MD;  Location: [facility];  Service: Endoscopy;  Laterality: N/A;    ESOPHAGOGASTRODUODENOSCOPY N/A 08/24/2022    Procedure: EGD (ESOPHAGOGASTRODUODENOSCOPY);  Surgeon: Micky Paredes III, MD;  Location: [facility];  Service: Endoscopy;  Laterality: N/A;    ESOPHAGOGASTRODUODENOSCOPY N/A 12/05/2022    Procedure: EGD (ESOPHAGOGASTRODUODENOSCOPY);  Surgeon: Marcelo Zhong MD;  Location: [facility];  Service: Endoscopy;  Laterality: N/A;    ESOPHAGOGASTRODUODENOSCOPY N/A 9/13/2024    Procedure: EGD (ESOPHAGOGASTRODUODENOSCOPY);  Surgeon: Marcelo Zhong MD;  Location: [facility];  Service: Endoscopy;  Laterality: N/A;    EYE SURGERY      INSERTION, CATHETER, TUNNELED N/A 06/17/2023    Procedure: Insertion,catheter,tunneled;  Surgeon: Carlos Thurman Jr., MD;  Location: [facility];  Service: General;  Laterality: N/A;    LAPAROSCOPIC CHOLECYSTECTOMY N/A 07/30/2022    Procedure: CHOLECYSTECTOMY, LAPAROSCOPIC;  Surgeon: Grey Perez MD;  Location: [facility] OR;  Service: General;  Laterality: N/A;    PLACEMENT OF DUAL-LUMEN VASCULAR CATHETER Left 07/12/2022    Procedure: INSERTION-CATHETER-JOSEPH;  Surgeon: Dionte Gan MD;  Location: [facility];  Service: General;  Laterality: Left;    PLACEMENT OF DUAL-LUMEN VASCULAR CATHETER Right 07/26/2022    Procedure: INSERTION-CATHETER-Hemosplit;  Surgeon: Dionte Gan MD;  Location: [facility] OR;  Service: General;  Laterality: Right;     Family History   Problem Relation Name Age of Onset    Diabetes Mother      Diabetes  Father       Social History     Tobacco Use    Smoking status: Never    Smokeless tobacco: Never   Substance Use Topics    Alcohol use: Not Currently    Drug use: No     Review of Systems   Constitutional:  Negative for chills and fever.   Respiratory:  Negative for cough, chest tightness, shortness of breath and wheezing.    Cardiovascular:  Negative for chest pain and palpitations.   Gastrointestinal:  Negative for abdominal pain, constipation, diarrhea, nausea and vomiting.   Genitourinary:  Negative for dysuria and hematuria.   Musculoskeletal:  Positive for myalgias. Negative for arthralgias, back pain, joint swelling, neck pain and neck stiffness.   Skin:  Negative for color change, pallor, rash and wound.   Neurological:  Negative for weakness and numbness.   Hematological:  Does not bruise/bleed easily.       Physical Exam     Initial Vitals   BP Pulse Resp Temp SpO2   11/07/24 1156 11/07/24 1156 11/07/24 1156 11/07/24 1211 11/07/24 1156   (!) 190/108 88 17 98.4 °F (36.9 °C) 99 %      MAP       --                Physical Exam    Nursing note and vitals reviewed.  Constitutional: She is not diaphoretic. No distress.   Chronically ill-appearing.   HENT:   Head: Normocephalic and atraumatic. Mouth/Throat: Oropharynx is clear and moist.   Eyes: Conjunctivae are normal.   Neck: Neck supple.   Normal range of motion.  Cardiovascular:  Normal rate, regular rhythm, normal heart sounds and intact distal pulses.     Exam reveals no gallop and no friction rub.       No murmur heard.  Pulmonary/Chest: Breath sounds normal. No respiratory distress. She has no wheezes. She has no rhonchi. She has no rales.   Equal, bilateral breath sounds noted without wheezing.     Abdominal: Abdomen is soft. She exhibits no distension and no mass. There is no abdominal tenderness.   No palpable abdominal tenderness noted.      Musculoskeletal:         General: No tenderness or edema. Normal range of motion.      Cervical back: Normal  range of motion and neck supple.     Neurological: She is alert and oriented to person, place, and time. She has normal strength. No sensory deficit.   Skin: Skin is warm and dry. No rash and no abscess noted. No erythema.   Psychiatric: She has a normal mood and affect.         ED Course   Procedures  Labs Reviewed   COMPREHENSIVE METABOLIC PANEL - Abnormal       Result Value    Sodium 142      Potassium 4.7      Chloride 99      CO2 24      Glucose 58 (*)     BUN 44 (*)     Creatinine 7.3 (*)     Calcium 9.4      Total Protein 7.1      Albumin 3.5      Total Bilirubin 1.2 (*)     Alkaline Phosphatase 177 (*)     AST 38      ALT 39      eGFR 7 (*)     Anion Gap 19 (*)     Narrative:       GLUCOSE critical result(s) called and verbal readback obtained from   SHAILA DUVALL by TN3 11/07/2024 13:45   URINALYSIS, REFLEX TO URINE CULTURE - Abnormal    Specimen UA Urine, Catheterized      Color, UA Yellow      Appearance, UA Clear      pH, UA 8.0      Specific Gravity, UA 1.010      Protein, UA 3+ (*)     Glucose, UA 4+ (*)     Ketones, UA Negative      Bilirubin (UA) Negative      Occult Blood UA Negative      Nitrite, UA Negative      Urobilinogen, UA Negative      Leukocytes, UA Negative      Narrative:     Specimen Source->Urine   CBC W/ AUTO DIFFERENTIAL - Abnormal    WBC 5.68      RBC 3.28 (*)     Hemoglobin 9.6 (*)     Hematocrit 28.8 (*)     MCV 88      MCH 29.3      MCHC 33.3      RDW 15.8 (*)     Platelets 112 (*)     MPV 9.6      Immature Granulocytes 0.4      Gran # (ANC) 4.5      Immature Grans (Abs) 0.02      Lymph # 0.7 (*)     Mono # 0.3      Eos # 0.2      Baso # 0.02      nRBC 0      Gran % 78.7 (*)     Lymph % 11.8 (*)     Mono % 5.5      Eosinophil % 3.2      Basophil % 0.4      Differential Method Automated     BASIC METABOLIC PANEL - Abnormal    Sodium 140      Potassium 4.6      Chloride 99      CO2 26      Glucose 72      BUN 45 (*)     Creatinine 7.4 (*)     Calcium 9.1      Anion Gap 15       eGFR 7 (*)    POCT GLUCOSE - Abnormal    POCT Glucose 115 (*)    ISTAT PROCEDURE - Abnormal    POC PH 7.422      POC PCO2 49.8 (*)     POC PO2 40      POC HCO3 32.4 (*)     POC BE 8 (*)     POC SATURATED O2 75      POC TCO2 34 (*)     Sample VENOUS      Site Other      Allens Test N/A      DelSys Room Air      Mode SPONT      Sp02 96     LIPASE    Lipase 35     BETA - HYDROXYBUTYRATE, SERUM    Beta-Hydroxybutyrate 0.0     URINALYSIS MICROSCOPIC    RBC, UA 0      WBC, UA 2      Bacteria None      Yeast, UA None      Squam Epithel, UA 0      Hyaline Casts, UA 0      Microscopic Comment SEE COMMENT      Narrative:     Specimen Source->Urine   MAGNESIUM   POCT GLUCOSE    POCT Glucose 99     POCT GLUCOSE    POCT Glucose 99            Imaging Results    None          Medications   morphine injection 6 mg (6 mg Intravenous Given 11/7/24 1332)   dextrose 5 % and 0.45% NaCl 5-0.45 % bolus 500 mL (0 mLs Intravenous Stopped 11/7/24 1645)     Medical Decision Making  Differential diagnosis:  Dehydration  DKA  Gastroparesis  Sepsis    Pt emergently evaluated here in the ED.    Patient is well-appearing.  No reproducible abdominal tenderness noted.  Labs are stable without leukocytosis.  Normal electrolytes, including potassium.  Renal function at baseline.  Urinalysis shows no evidence for infection and was rechecked given recent admission to the hospital for UTI.  Lipase is negative.  Beta hydroxybutyrate is negative.  Blood glucose is stable.  She is feeling much better after a dose of pain medication here in the emergency department.  Patient currently states she wishes to go home and I feel this is reasonable, given her relatively negative at baseline workup.  She is encouraged to follow up as scheduled and undergo dialysis on Saturday.  She voices understanding and is agreeable with the plan.  She is given specific return precautions.    Amount and/or Complexity of Data Reviewed  Labs: ordered. Decision-making details  documented in ED Course.    Risk  OTC drugs.  Prescription drug management.                                      Clinical Impression:  Final diagnoses:  [N18.6, Z99.2] ESRD (end stage renal disease) on dialysis (Primary)  [R52] Generalized body aches          ED Disposition Condition    Discharge Stable          ED Prescriptions    None       Follow-up Information       Follow up With Specialties Details Why Contact Info Additional Information    [facility] Emergency Medicine  As needed, If symptoms worsen [address] [place] [address] [address] [place] [address] 1st floor             Thelma Mata PA-C  11/07/24 2039

## 2024-11-10 ENCOUNTER — HOSPITAL ENCOUNTER (EMERGENCY)
Facility: HOSPITAL | Age: 34
Discharge: HOME | End: 2024-11-10
Attending: STUDENT IN AN ORGANIZED HEALTH CARE EDUCATION/TRAINING PROGRAM
Payer: MEDICAID

## 2024-11-10 ENCOUNTER — HOSPITAL ENCOUNTER (EMERGENCY)
Facility: HOSPITAL | Age: 34
Discharge: HOME | End: 2024-11-10
Attending: EMERGENCY MEDICINE
Payer: MEDICAID

## 2024-11-10 ENCOUNTER — HOSPITAL ENCOUNTER (EMERGENCY)
Facility: HOSPITAL | Age: 34
Discharge: LEFT BEFORE DISCHARGE | End: 2024-11-10
Attending: EMERGENCY MEDICINE
Payer: MEDICAID

## 2024-11-10 VITALS
WEIGHT: 117 LBS | SYSTOLIC BLOOD PRESSURE: 192 MMHG | RESPIRATION RATE: 18 BRPM | DIASTOLIC BLOOD PRESSURE: 90 MMHG | TEMPERATURE: 98 F | OXYGEN SATURATION: 98 % | HEIGHT: 63 IN | BODY MASS INDEX: 20.73 KG/M2 | HEART RATE: 92 BPM

## 2024-11-10 VITALS
BODY MASS INDEX: 20.73 KG/M2 | HEART RATE: 81 BPM | DIASTOLIC BLOOD PRESSURE: 104 MMHG | RESPIRATION RATE: 16 BRPM | SYSTOLIC BLOOD PRESSURE: 184 MMHG | WEIGHT: 117 LBS | OXYGEN SATURATION: 95 % | TEMPERATURE: 99 F

## 2024-11-10 VITALS
TEMPERATURE: 98 F | HEIGHT: 63 IN | BODY MASS INDEX: 20.73 KG/M2 | DIASTOLIC BLOOD PRESSURE: 101 MMHG | RESPIRATION RATE: 20 BRPM | WEIGHT: 117 LBS | SYSTOLIC BLOOD PRESSURE: 186 MMHG | OXYGEN SATURATION: 95 % | HEART RATE: 90 BPM

## 2024-11-10 DIAGNOSIS — R10.9 FLANK PAIN: Primary | ICD-10-CM

## 2024-11-10 DIAGNOSIS — I31.39 PERICARDIAL EFFUSION: ICD-10-CM

## 2024-11-10 DIAGNOSIS — G89.4 CHRONIC PAIN SYNDROME: Primary | ICD-10-CM

## 2024-11-10 DIAGNOSIS — R10.9 FLANK PAIN: ICD-10-CM

## 2024-11-10 LAB
ALBUMIN SERPL BCP-MCNC: 3.7 G/DL (ref 3.5–5.2)
ALBUMIN SERPL BCP-MCNC: 4.9 G/DL (ref 3.5–5.2)
ALP SERPL-CCNC: 191 U/L (ref 55–135)
ALP SERPL-CCNC: 201 U/L (ref 40–150)
ALT SERPL W/O P-5'-P-CCNC: 26 U/L (ref 10–44)
ALT SERPL W/O P-5'-P-CCNC: 28 U/L (ref 10–44)
ANION GAP SERPL CALC-SCNC: 15 MMOL/L (ref 8–16)
ANION GAP SERPL CALC-SCNC: 21 MMOL/L (ref 8–16)
AST SERPL-CCNC: 19 U/L (ref 10–40)
AST SERPL-CCNC: 24 U/L (ref 10–40)
B-HCG UR QL: NEGATIVE
BACTERIA #/AREA URNS HPF: ABNORMAL /HPF
BASOPHILS # BLD AUTO: 0.01 K/UL (ref 0–0.2)
BASOPHILS # BLD AUTO: 0.03 K/UL (ref 0–0.2)
BASOPHILS NFR BLD: 0.2 % (ref 0–1.9)
BASOPHILS NFR BLD: 0.4 % (ref 0–1.9)
BILIRUB SERPL-MCNC: 2.6 MG/DL (ref 0.1–1)
BILIRUB SERPL-MCNC: 2.8 MG/DL (ref 0.1–1)
BILIRUB UR QL STRIP: NEGATIVE
BUN SERPL-MCNC: 31 MG/DL (ref 6–20)
BUN SERPL-MCNC: 40 MG/DL (ref 6–20)
CALCIUM SERPL-MCNC: 10 MG/DL (ref 8.7–10.5)
CALCIUM SERPL-MCNC: 9.9 MG/DL (ref 8.7–10.5)
CHLORIDE SERPL-SCNC: 103 MMOL/L (ref 95–110)
CHLORIDE SERPL-SCNC: 98 MMOL/L (ref 95–110)
CLARITY UR: ABNORMAL
CO2 SERPL-SCNC: 21 MMOL/L (ref 23–29)
CO2 SERPL-SCNC: 31 MMOL/L (ref 23–29)
COLOR UR: YELLOW
CREAT SERPL-MCNC: 5.4 MG/DL (ref 0.5–1.4)
CREAT SERPL-MCNC: 6.6 MG/DL (ref 0.5–1.4)
CTP QC/QA: YES
DIFFERENTIAL METHOD BLD: ABNORMAL
DIFFERENTIAL METHOD BLD: ABNORMAL
EOSINOPHIL # BLD AUTO: 0.1 K/UL (ref 0–0.5)
EOSINOPHIL # BLD AUTO: 0.2 K/UL (ref 0–0.5)
EOSINOPHIL NFR BLD: 1.4 % (ref 0–8)
EOSINOPHIL NFR BLD: 3.5 % (ref 0–8)
ERYTHROCYTE [DISTWIDTH] IN BLOOD BY AUTOMATED COUNT: 15.7 % (ref 11.5–14.5)
ERYTHROCYTE [DISTWIDTH] IN BLOOD BY AUTOMATED COUNT: 15.9 % (ref 11.5–14.5)
EST. GFR  (NO RACE VARIABLE): 10 ML/MIN/1.73 M^2
EST. GFR  (NO RACE VARIABLE): 7.8 ML/MIN/1.73 M^2
GLUCOSE SERPL-MCNC: 151 MG/DL (ref 70–110)
GLUCOSE SERPL-MCNC: 192 MG/DL (ref 70–110)
GLUCOSE UR QL STRIP: ABNORMAL
HCT VFR BLD AUTO: 26.2 % (ref 37–48.5)
HCT VFR BLD AUTO: 32.3 % (ref 37–48.5)
HGB BLD-MCNC: 10.4 G/DL (ref 12–16)
HGB BLD-MCNC: 8.6 G/DL (ref 12–16)
HGB UR QL STRIP: ABNORMAL
HYALINE CASTS #/AREA URNS LPF: 0 /LPF
IMM GRANULOCYTES # BLD AUTO: 0.02 K/UL (ref 0–0.04)
IMM GRANULOCYTES # BLD AUTO: 0.02 K/UL (ref 0–0.04)
IMM GRANULOCYTES NFR BLD AUTO: 0.3 % (ref 0–0.5)
IMM GRANULOCYTES NFR BLD AUTO: 0.4 % (ref 0–0.5)
KETONES UR QL STRIP: NEGATIVE
LEUKOCYTE ESTERASE UR QL STRIP: ABNORMAL
LIPASE SERPL-CCNC: 16 U/L (ref 4–60)
LYMPHOCYTES # BLD AUTO: 0.5 K/UL (ref 1–4.8)
LYMPHOCYTES # BLD AUTO: 0.6 K/UL (ref 1–4.8)
LYMPHOCYTES NFR BLD: 12.5 % (ref 18–48)
LYMPHOCYTES NFR BLD: 7.2 % (ref 18–48)
MCH RBC QN AUTO: 28.5 PG (ref 27–31)
MCH RBC QN AUTO: 28.8 PG (ref 27–31)
MCHC RBC AUTO-ENTMCNC: 32.2 G/DL (ref 32–36)
MCHC RBC AUTO-ENTMCNC: 32.8 G/DL (ref 32–36)
MCV RBC AUTO: 87 FL (ref 82–98)
MCV RBC AUTO: 90 FL (ref 82–98)
MICROSCOPIC COMMENT: ABNORMAL
MONOCYTES # BLD AUTO: 0.3 K/UL (ref 0.3–1)
MONOCYTES # BLD AUTO: 0.3 K/UL (ref 0.3–1)
MONOCYTES NFR BLD: 4.4 % (ref 4–15)
MONOCYTES NFR BLD: 4.9 % (ref 4–15)
NEUTROPHILS # BLD AUTO: 4 K/UL (ref 1.8–7.7)
NEUTROPHILS # BLD AUTO: 6.1 K/UL (ref 1.8–7.7)
NEUTROPHILS NFR BLD: 78.5 % (ref 38–73)
NEUTROPHILS NFR BLD: 86.3 % (ref 38–73)
NITRITE UR QL STRIP: NEGATIVE
NRBC BLD-RTO: 0 /100 WBC
NRBC BLD-RTO: 0 /100 WBC
PH UR STRIP: >8 [PH] (ref 5–8)
PLATELET # BLD AUTO: 122 K/UL (ref 150–450)
PLATELET # BLD AUTO: 129 K/UL (ref 150–450)
PMV BLD AUTO: 10.4 FL (ref 9.2–12.9)
PMV BLD AUTO: 9.9 FL (ref 9.2–12.9)
POTASSIUM SERPL-SCNC: 4.5 MMOL/L (ref 3.5–5.1)
POTASSIUM SERPL-SCNC: 5.2 MMOL/L (ref 3.5–5.1)
PROT SERPL-MCNC: 7.2 G/DL (ref 6–8.4)
PROT SERPL-MCNC: 8.1 G/DL (ref 6–8.4)
PROT UR QL STRIP: ABNORMAL
RBC # BLD AUTO: 3.02 M/UL (ref 4–5.4)
RBC # BLD AUTO: 3.61 M/UL (ref 4–5.4)
RBC #/AREA URNS HPF: 5 /HPF (ref 0–4)
SODIUM SERPL-SCNC: 144 MMOL/L (ref 136–145)
SODIUM SERPL-SCNC: 145 MMOL/L (ref 136–145)
SP GR UR STRIP: 1.01 (ref 1–1.03)
URN SPEC COLLECT METH UR: ABNORMAL
UROBILINOGEN UR STRIP-ACNC: NEGATIVE EU/DL
WBC # BLD AUTO: 5.1 K/UL (ref 3.9–12.7)
WBC # BLD AUTO: 7.07 K/UL (ref 3.9–12.7)
WBC #/AREA URNS HPF: 35 /HPF (ref 0–5)
WBC CLUMPS URNS QL MICRO: ABNORMAL
YEAST URNS QL MICRO: ABNORMAL

## 2024-11-10 PROCEDURE — 80053 COMPREHEN METABOLIC PANEL: CPT | Mod: 91 | Performed by: STUDENT IN AN ORGANIZED HEALTH CARE EDUCATION/TRAINING PROGRAM

## 2024-11-10 PROCEDURE — 96372 THER/PROPH/DIAG INJ SC/IM: CPT | Performed by: STUDENT IN AN ORGANIZED HEALTH CARE EDUCATION/TRAINING PROGRAM

## 2024-11-10 PROCEDURE — 80053 COMPREHEN METABOLIC PANEL: CPT | Performed by: EMERGENCY MEDICINE

## 2024-11-10 PROCEDURE — 63600175 PHARM REV CODE 636 W HCPCS: Performed by: STUDENT IN AN ORGANIZED HEALTH CARE EDUCATION/TRAINING PROGRAM

## 2024-11-10 PROCEDURE — 99285 EMERGENCY DEPT VISIT HI MDM: CPT | Mod: 25

## 2024-11-10 PROCEDURE — 99284 EMERGENCY DEPT VISIT MOD MDM: CPT | Mod: 25

## 2024-11-10 PROCEDURE — 81001 URINALYSIS AUTO W/SCOPE: CPT | Performed by: EMERGENCY MEDICINE

## 2024-11-10 PROCEDURE — 99283 EMERGENCY DEPT VISIT LOW MDM: CPT | Mod: 25

## 2024-11-10 PROCEDURE — 87086 URINE CULTURE/COLONY COUNT: CPT | Performed by: EMERGENCY MEDICINE

## 2024-11-10 PROCEDURE — 96375 TX/PRO/DX INJ NEW DRUG ADDON: CPT

## 2024-11-10 PROCEDURE — 85025 COMPLETE CBC W/AUTO DIFF WBC: CPT | Performed by: EMERGENCY MEDICINE

## 2024-11-10 PROCEDURE — P9612 CATHETERIZE FOR URINE SPEC: HCPCS

## 2024-11-10 PROCEDURE — 83690 ASSAY OF LIPASE: CPT | Performed by: EMERGENCY MEDICINE

## 2024-11-10 PROCEDURE — 81025 URINE PREGNANCY TEST: CPT | Performed by: EMERGENCY MEDICINE

## 2024-11-10 PROCEDURE — 96374 THER/PROPH/DIAG INJ IV PUSH: CPT

## 2024-11-10 PROCEDURE — 85025 COMPLETE CBC W/AUTO DIFF WBC: CPT | Mod: 91 | Performed by: STUDENT IN AN ORGANIZED HEALTH CARE EDUCATION/TRAINING PROGRAM

## 2024-11-10 PROCEDURE — 63600175 PHARM REV CODE 636 W HCPCS: Performed by: EMERGENCY MEDICINE

## 2024-11-10 PROCEDURE — 96365 THER/PROPH/DIAG IV INF INIT: CPT

## 2024-11-10 RX ORDER — HYDROMORPHONE HYDROCHLORIDE 1 MG/ML
0.5 INJECTION, SOLUTION INTRAMUSCULAR; INTRAVENOUS; SUBCUTANEOUS
Status: COMPLETED | OUTPATIENT
Start: 2024-11-10 | End: 2024-11-10

## 2024-11-10 RX ORDER — DIPHENHYDRAMINE HYDROCHLORIDE 50 MG/ML
25 INJECTION INTRAMUSCULAR; INTRAVENOUS
Status: DISCONTINUED | OUTPATIENT
Start: 2024-11-10 | End: 2024-11-10 | Stop reason: HOSPADM

## 2024-11-10 RX ORDER — ONDANSETRON HYDROCHLORIDE 2 MG/ML
4 INJECTION, SOLUTION INTRAVENOUS
Status: COMPLETED | OUTPATIENT
Start: 2024-11-10 | End: 2024-11-10

## 2024-11-10 RX ORDER — LORAZEPAM 2 MG/ML
0.5 INJECTION INTRAMUSCULAR
Status: COMPLETED | OUTPATIENT
Start: 2024-11-10 | End: 2024-11-10

## 2024-11-10 RX ORDER — HYDROMORPHONE HYDROCHLORIDE 1 MG/ML
0.5 INJECTION, SOLUTION INTRAMUSCULAR; INTRAVENOUS; SUBCUTANEOUS
Status: DISCONTINUED | OUTPATIENT
Start: 2024-11-10 | End: 2024-11-10 | Stop reason: HOSPADM

## 2024-11-10 RX ADMIN — HYDROMORPHONE HYDROCHLORIDE 0.5 MG: 1 INJECTION, SOLUTION INTRAMUSCULAR; INTRAVENOUS; SUBCUTANEOUS at 07:11

## 2024-11-10 RX ADMIN — ONDANSETRON 4 MG: 2 INJECTION INTRAMUSCULAR; INTRAVENOUS at 11:11

## 2024-11-10 RX ADMIN — HYDROMORPHONE HYDROCHLORIDE 0.5 MG: 1 INJECTION, SOLUTION INTRAMUSCULAR; INTRAVENOUS; SUBCUTANEOUS at 11:11

## 2024-11-10 RX ADMIN — LORAZEPAM 0.5 MG: 2 INJECTION INTRAMUSCULAR at 12:11

## 2024-11-10 RX ADMIN — HYDROMORPHONE HYDROCHLORIDE 0.5 MG: 1 INJECTION, SOLUTION INTRAMUSCULAR; INTRAVENOUS; SUBCUTANEOUS at 06:11

## 2024-11-10 NOTE — LETTER
Patient: Tabby Howard  YOB: 1990  Date: 11/10/2024 Time: 9:48 AM  Location: [facility]    Leaving the Hospital Against Medical Advice    Chart #:[id number]    This will certify that I, the undersigned,    ______________________________________________________________________    A patient in the above named medical center, having requested discharge and removal from the medical center against the advice of my attending physician(s), hereby release [facility], its physicians, officers and employees, severally and individually, from any and all liability of any nature whatsoever for any injury or harm or complication of any kind that may result directly or indirectly, by reason of my terminating my stay as a patient at [facility] of [facility] and my departure from [facility], and hereby waive any and all rights of action I may now have or later acquire as a result of my voluntary departure from [facility] and the termination of my stay as a patient therein.    This release is made with the full knowledge of the danger that may result from the action which I am taking.      Date:_______________________                         ___________________________                                                                                    Patient/Legal Representative    Witness:        ____________________________                          ___________________________  Nurse                                                                        Physician

## 2024-11-10 NOTE — ED PROVIDER NOTES
"Encounter Date: 11/10/2024       History     Chief Complaint   Patient presents with    Abdominal Pain     34-year-old female history of  dialysis, sickle cell trait, diabetes presents to the ER with chronic right flank pain.  Pain began today.  She also reports vomiting, denies abdominal pain at this time.  Flank pain is chronic and has been worked up on multiple occasions.  She is requesting pain medication.    The history is provided by the patient.     Review of patient's allergies indicates:   Allergen Reactions    Droperidol Hives    Haldol [haloperidol lactate] Hives    Penicillins Hives    Droperidol (bulk) Anxiety     STATES "FREAKS OUT".  TOLERATES HALDOL PRIOR TO ARRIVAL AT ANOTHER FACILITY TODAY.      Past Medical History:   Diagnosis Date    ESRD on hemodialysis 2022    Gastritis 2022    EGD was 22    Gastroparesis 2022    has not had Emptying study    Heart failure with preserved ejection fraction 2022    EF 55% on 3/22    History of supraventricular tachycardia     Hyperkalemia 2022    Hypertensive emergency 2022    Sickle cell trait 2022    Type 2 diabetes mellitus      Past Surgical History:   Procedure Laterality Date     SECTION      x 3    COLONOSCOPY      COLONOSCOPY N/A 2022    Procedure: COLONOSCOPY;  Surgeon: Jagdeep Cedeno MD;  Location: [facility];  Service: Endoscopy;  Laterality: N/A;    DESTRUCTION, CILIARY BODY, USING LASER Left 2024    Procedure: Cyclophotocoagulation G-probe, retrobulbar chlorpromazine left eye;  Surgeon: Fidel Wise MD;  Location: [address];  Service: Ophthalmology;  Laterality: Left;    ENDOSCOPIC ULTRASOUND OF UPPER GASTROINTESTINAL TRACT N/A 2023    Procedure: ULTRASOUND, UPPER GI TRACT, ENDOSCOPIC;  Surgeon: Micky Paredes III, MD;  Location: [facility];  Service: Endoscopy;  Laterality: N/A;    ESOPHAGOGASTRODUODENOSCOPY N/A 10/18/2019    Procedure: " ESOPHAGOGASTRODUODENOSCOPY (EGD);  Surgeon: Gianluca Mendez MD;  Location: [facility] ENDO;  Service: Endoscopy;  Laterality: N/A;    ESOPHAGOGASTRODUODENOSCOPY N/A 08/24/2022    Procedure: EGD (ESOPHAGOGASTRODUODENOSCOPY);  Surgeon: Micky Paredes III, MD;  Location: [facility] ENDO;  Service: Endoscopy;  Laterality: N/A;    ESOPHAGOGASTRODUODENOSCOPY N/A 12/05/2022    Procedure: EGD (ESOPHAGOGASTRODUODENOSCOPY);  Surgeon: Marcelo Zhong MD;  Location: [facility];  Service: Endoscopy;  Laterality: N/A;    ESOPHAGOGASTRODUODENOSCOPY N/A 9/13/2024    Procedure: EGD (ESOPHAGOGASTRODUODENOSCOPY);  Surgeon: Marcelo Zhong MD;  Location: [facility];  Service: Endoscopy;  Laterality: N/A;    EYE SURGERY      INSERTION, CATHETER, TUNNELED N/A 06/17/2023    Procedure: Insertion,catheter,tunneled;  Surgeon: Carlos Thurman Jr., MD;  Location: [facility] OR;  Service: General;  Laterality: N/A;    LAPAROSCOPIC CHOLECYSTECTOMY N/A 07/30/2022    Procedure: CHOLECYSTECTOMY, LAPAROSCOPIC;  Surgeon: Grey Perez MD;  Location: [facility] OR;  Service: General;  Laterality: N/A;    PLACEMENT OF DUAL-LUMEN VASCULAR CATHETER Left 07/12/2022    Procedure: INSERTION-CATHETER-JOSEPH;  Surgeon: Dionte Gan MD;  Location: [facility] OR;  Service: General;  Laterality: Left;    PLACEMENT OF DUAL-LUMEN VASCULAR CATHETER Right 07/26/2022    Procedure: INSERTION-CATHETER-Hemosplit;  Surgeon: Dionte Gan MD;  Location: [facility] OR;  Service: General;  Laterality: Right;     Family History   Problem Relation Name Age of Onset    Diabetes Mother      Diabetes Father       Social History     Tobacco Use    Smoking status: Never    Smokeless tobacco: Never   Substance Use Topics    Alcohol use: Not Currently    Drug use: No     Review of Systems   Gastrointestinal:  Positive for vomiting.   Genitourinary:  Positive for flank pain.       Physical Exam     Initial Vitals   BP Pulse Resp Temp SpO2   -- -- -- -- --      MAP       --         Physical  Exam    Nursing note and vitals reviewed.  Constitutional: She appears well-developed and well-nourished. She is not diaphoretic.  Non-toxic appearance. She appears ill. She appears distressed.   Writhing around on gurney, chronically ill appearance   HENT:   Head: Normocephalic and atraumatic.   Facial edema   Eyes: EOM are normal.   Neck: Neck supple.   Normal range of motion.  Cardiovascular:  Normal rate, regular rhythm and normal heart sounds.     Exam reveals no gallop and no friction rub.       No murmur heard.  Pulmonary/Chest: Breath sounds normal. No respiratory distress. She has no wheezes. She has no rhonchi. She has no rales.   Abdominal: She exhibits no distension. There is no abdominal tenderness. There is no rebound.   Musculoskeletal:         General: Normal range of motion.      Cervical back: Normal range of motion and neck supple.     Neurological: She is alert and oriented to person, place, and time.   Skin: Skin is warm and dry.   Psychiatric: She has a normal mood and affect. Her behavior is normal. Judgment and thought content normal.         ED Course   Procedures  Labs Reviewed   CBC W/ AUTO DIFFERENTIAL   BASIC METABOLIC PANEL          Imaging Results    None          Medications   diphenhydrAMINE injection 25 mg (has no administration in time range)   promethazine (PHENERGAN) 25 mg in 0.9% NaCl 50 mL IVPB (has no administration in time range)     Medical Decision Making  34-year-old female chronic flank pain on dialysis presents to the ER complaining of chronic right flank pain.  She is demanding pain medication.  I advised her that I would not be giving IV narcotics as there is concern for drug-seeking behavior in the past given continued flank pain that has been thoroughly evaluated with numerous CT scans, and admissions.  At that point she demanded to leave the emergency room against medical advice.  I did offer her an evaluation and told her she needed labs but she seems only  interested in obtaining pain medication.  She would not sign the against medical advice form.  She is not altered or confused.    Amount and/or Complexity of Data Reviewed  External Data Reviewed: labs, radiology and notes.  Labs: ordered.    Risk  Prescription drug management.               ED Course as of 11/10/24 0957   Sun Nov 10, 2024   0947 Patient here for chronic R flank pain, refusing all workup unless we give her pain meds which are not indicated for chronic flank pain. Now demanding to be released [EF]   0950 SpO2: 98 % [EF]   0950 Resp: 18 [EF]   0950 Pulse: 92 [EF]   0950 Temp Source: Oral [EF]   0950 Temp: 98 °F (36.7 °C) [EF]   0950 BP(!): 192/90 [EF]      ED Course User Index  [EF] Elfego Manzo MD                           Clinical Impression:  Final diagnoses:  [R10.9] Flank pain                  Advised patient that they need admission and/or further workup.  Patient refuses.  Alert and oriented to person place and situation.  I explained the risks of worsening condition, death etc in layman's terms to the patient.  Patient is not altered and is not under the influence.  Patient is welcome to return to the hospital at any time if they choose to do so.  I will Discharge the patient AGAINST MEDICAL ADVICE.       Elfego Manzo MD  11/10/24 6753

## 2024-11-10 NOTE — DISCHARGE INSTRUCTIONS
This CT showed fluid around your heart, pericardial effusion.  You have been refer to Cardiology for further evaluation.

## 2024-11-10 NOTE — ED NOTES
Patient with decreased oxygen saturation following Lorazepam 0.5mg injection. Patient responds to gentle stimulation and oxygen level increases when she wakes up. Provider made aware and will monitor patient until she is maintaining oxygen without O2.

## 2024-11-10 NOTE — ED NOTES
Pt refusing EKG. Pt states she just wants pain medicine.  notified.  at bedside speaking with pt and assessing pt. Pt requesting to go  AMA. Pt refused to sign AMA paper as witness with charge nurse SHAILA Lyman.

## 2024-11-10 NOTE — ED PROVIDER NOTES
"Encounter Date: 11/10/2024       History     Chief Complaint   Patient presents with    Bilateral kidney pain     Dialysis patient, had dialysis yesterday       Patient with a history of chronic flank pain.  Patient reports he has has increasing flank pain over last couple days.  No vomiting.  No fever chills.  Patient was evaluated at [facility] a little while ago.  She left AMA than called ambulance and came here.  She is compliant with dialysis but complete dialysis yesterday.      Review of patient's allergies indicates:   Allergen Reactions    Droperidol Hives    Haldol [haloperidol lactate] Hives    Penicillins Hives    Droperidol (bulk) Anxiety     STATES "FREAKS OUT".  TOLERATES HALDOL PRIOR TO ARRIVAL AT ANOTHER FACILITY TODAY.      Past Medical History:   Diagnosis Date    ESRD on hemodialysis 2022    Gastritis 2022    EGD was 22    Gastroparesis 2022    has not had Emptying study    Heart failure with preserved ejection fraction 2022    EF 55% on 3/22    History of supraventricular tachycardia     Hyperkalemia 2022    Hypertensive emergency 2022    Sickle cell trait 2022    Type 2 diabetes mellitus      Past Surgical History:   Procedure Laterality Date     SECTION      x 3    COLONOSCOPY      COLONOSCOPY N/A 2022    Procedure: COLONOSCOPY;  Surgeon: Jagdeep Cedeno MD;  Location: [facility];  Service: Endoscopy;  Laterality: N/A;    DESTRUCTION, CILIARY BODY, USING LASER Left 2024    Procedure: Cyclophotocoagulation G-probe, retrobulbar chlorpromazine left eye;  Surgeon: Fidel Wise MD;  Location: [address];  Service: Ophthalmology;  Laterality: Left;    ENDOSCOPIC ULTRASOUND OF UPPER GASTROINTESTINAL TRACT N/A 2023    Procedure: ULTRASOUND, UPPER GI TRACT, ENDOSCOPIC;  Surgeon: Micky Paredes III, MD;  Location: [facility] ENDO;  Service: Endoscopy;  Laterality: N/A;    ESOPHAGOGASTRODUODENOSCOPY N/A 10/18/2019    " Procedure: ESOPHAGOGASTRODUODENOSCOPY (EGD);  Surgeon: Gianluca Mendez MD;  Location: [facility] ENDO;  Service: Endoscopy;  Laterality: N/A;    ESOPHAGOGASTRODUODENOSCOPY N/A 08/24/2022    Procedure: EGD (ESOPHAGOGASTRODUODENOSCOPY);  Surgeon: Micky Paredes III, MD;  Location: [facility] ENDO;  Service: Endoscopy;  Laterality: N/A;    ESOPHAGOGASTRODUODENOSCOPY N/A 12/05/2022    Procedure: EGD (ESOPHAGOGASTRODUODENOSCOPY);  Surgeon: Marcelo Zhong MD;  Location: [facility];  Service: Endoscopy;  Laterality: N/A;    ESOPHAGOGASTRODUODENOSCOPY N/A 9/13/2024    Procedure: EGD (ESOPHAGOGASTRODUODENOSCOPY);  Surgeon: Marcelo Zhong MD;  Location: [facility];  Service: Endoscopy;  Laterality: N/A;    EYE SURGERY      INSERTION, CATHETER, TUNNELED N/A 06/17/2023    Procedure: Insertion,catheter,tunneled;  Surgeon: Carlos Thurman Jr., MD;  Location: [facility];  Service: General;  Laterality: N/A;    LAPAROSCOPIC CHOLECYSTECTOMY N/A 07/30/2022    Procedure: CHOLECYSTECTOMY, LAPAROSCOPIC;  Surgeon: Grey Perez MD;  Location: [facility] OR;  Service: General;  Laterality: N/A;    PLACEMENT OF DUAL-LUMEN VASCULAR CATHETER Left 07/12/2022    Procedure: INSERTION-CATHETER-JOSEPH;  Surgeon: Dionte Gan MD;  Location: [facility] OR;  Service: General;  Laterality: Left;    PLACEMENT OF DUAL-LUMEN VASCULAR CATHETER Right 07/26/2022    Procedure: INSERTION-CATHETER-Hemosplit;  Surgeon: Dionte Gan MD;  Location: [facility] OR;  Service: General;  Laterality: Right;     Family History   Problem Relation Name Age of Onset    Diabetes Mother      Diabetes Father       Social History     Tobacco Use    Smoking status: Never    Smokeless tobacco: Never   Substance Use Topics    Alcohol use: Not Currently    Drug use: No     Review of Systems   Constitutional:  Negative for chills and fever.   HENT:  Negative for congestion.    Eyes:  Negative for visual disturbance.   Respiratory:  Negative for shortness of breath.    Cardiovascular:   Negative for chest pain and palpitations.   Gastrointestinal:  Negative for vomiting.   Genitourinary:  Negative for dysuria.   Musculoskeletal:  Negative for joint swelling.   Neurological:  Negative for headaches.   Psychiatric/Behavioral:  Negative for confusion.        Physical Exam     Initial Vitals [11/10/24 1044]   BP Pulse Resp Temp SpO2   (!) 212/127 101 20 98.5 °F (36.9 °C) 95 %      MAP       --         Physical Exam    Nursing note and vitals reviewed.  Constitutional: She is not diaphoretic. No distress.   HENT:   Head: Normocephalic and atraumatic.   Eyes: Conjunctivae are normal.   Neck:   Normal range of motion.  Cardiovascular:  Normal rate.           Pulmonary/Chest: Breath sounds normal.   Abdominal: Abdomen is soft. There is no abdominal tenderness.   No midline vertebral tenderness.  No CVA tenderness.   Musculoskeletal:         General: Normal range of motion.      Cervical back: Normal range of motion.     Neurological: She is alert.   No gross deficits   Skin: No rash noted.   Psychiatric:   Patient appears uncomfortable, tearful.         ED Course   Procedures  Labs Reviewed   CBC W/ AUTO DIFFERENTIAL - Abnormal       Result Value    WBC 5.10      RBC 3.61 (*)     Hemoglobin 10.4 (*)     Hematocrit 32.3 (*)     MCV 90      MCH 28.8      MCHC 32.2      RDW 15.9 (*)     Platelets 129 (*)     MPV 9.9      Immature Granulocytes 0.4      Gran # (ANC) 4.0      Immature Grans (Abs) 0.02      Lymph # 0.6 (*)     Mono # 0.3      Eos # 0.2      Baso # 0.01      nRBC 0      Gran % 78.5 (*)     Lymph % 12.5 (*)     Mono % 4.9      Eosinophil % 3.5      Basophil % 0.2      Differential Method Automated     COMPREHENSIVE METABOLIC PANEL - Abnormal    Sodium 144      Potassium 4.5      Chloride 98      CO2 31 (*)     Glucose 151 (*)     BUN 31 (*)     Creatinine 5.4 (*)     Calcium 9.9      Total Protein 8.1      Albumin 4.9      Total Bilirubin 2.6 (*)     Alkaline Phosphatase 191 (*)     AST 19      ALT  28      eGFR 10.0 (*)     Anion Gap 15     URINALYSIS, REFLEX TO URINE CULTURE - Abnormal    Specimen UA Urine, Clean Catch      Color, UA Yellow      Appearance, UA Hazy (*)     pH, UA >8.0 (*)     Specific Gravity, UA 1.010      Protein, UA 3+ (*)     Glucose, UA 3+ (*)     Ketones, UA Negative      Bilirubin (UA) Negative      Occult Blood UA 1+ (*)     Nitrite, UA Negative      Urobilinogen, UA Negative      Leukocytes, UA 2+ (*)     Narrative:     Specimen Source->Urine   URINALYSIS MICROSCOPIC - Abnormal    RBC, UA 5 (*)     WBC, UA 35 (*)     WBC Clumps, UA Occasional (*)     Bacteria Rare      Yeast, UA None      Hyaline Casts, UA 0      Microscopic Comment SEE COMMENT      Narrative:     Specimen Source->Urine   CULTURE, URINE   LIPASE    Lipase 16     POCT URINE PREGNANCY    POC Preg Test, Ur Negative       Acceptable Yes            Imaging Results              CT Renal Stone Study ABD Pelvis WO (Final result)  Result time 11/10/24 11:52:34      Final result by Zaid Wilson MD (11/10/24 11:52:34)                   Impression:      1. Moderate-sized pericardial effusion, increased compared to October 20.  2. Extensive intra-abdominal and pelvic atherosclerotic vascular calcification.  3. Surgical absence of the gallbladder.  4. Mild splenomegaly.  5. Mild bilateral renal atrophy.  6. Not mentioned above, there are 2 small air bubbles within the urinary bladder lumen, presumed iatrogenic.  Correlate with history.  If there has been no recent catheterization, infection should be considered.      Electronically signed by: Zaid Wilson  Date:    11/10/2024  Time:    11:52               Narrative:    EXAMINATION:  CT RENAL STONE STUDY ABD PELVIS WO    CLINICAL HISTORY:  Flank pain, kidney stone suspected;.    TECHNIQUE:  Thin section axial images were obtained, without intravenous contrast. The lack of intravenous contrast limits assessment of solid organs and vascular  structures.    COMPARISON:  October 20, 2024    FINDINGS:  The lung bases are unremarkable.  There is a moderate-sized pericardial effusion, increased compared to October 20.    No hepatic mass or contour abnormality is identified.  The gallbladder is absent.  The biliary tree is nondilated.  The spleen is mildly enlarged at 13.3 cm in longitudinal dimension.  The pancreas and adrenal glands are unremarkable.    There is no evidence of renal mass or hydronephrosis.  Mild bilateral renal atrophy is noted, with extensive atherosclerotic renal arterial vascular calcification.  There is extensive atherosclerotic calcification of the abdominal aorta and mesenteric arteries as well.    There is no pathologic bowel wall thickening or evidence of obstruction.  No free air or free fluid is identified.    Images of the pelvis demonstrate extensive atherosclerotic vascular calcification.  The urinary bladder is unremarkable.  Bowel structures are within normal limits.  There is no free fluid.    No acute osseous abnormalities are identified.                                       Medications   ondansetron injection 4 mg (4 mg Intravenous Given 11/10/24 1107)   HYDROmorphone injection 0.5 mg (0.5 mg Intravenous Given 11/10/24 1107)   LORazepam injection 0.5 mg (0.5 mg Intravenous Given 11/10/24 1230)     Medical Decision Making  Patient presents with bilateral flank pain.  Patient with multiple previous similar presentations.  Given patient just went to another emergency department and returned here will evaluate further.  Differential diagnosis includes pyelonephritis, renal calculus, musculoskeletal back pain.  Here CMP with stable renal function.  CMP essentially unchanged.  CT of the abdomen and pelvis with no renal calculi.  Patient does have moderate pericardial effusion.  Urinalysis no evidence infection.  Here patient does have chronic flank pain.  She is having an exacerbation of unclear etiology.  Feeling better after  treatment here.  She is stable for discharge.  Patient does have moderate pericardial effusion with no evidence of tamponade.  Will refer to Cardiology for further evaluation.    Amount and/or Complexity of Data Reviewed  Labs: ordered. Decision-making details documented in ED Course.  Radiology: ordered. Decision-making details documented in ED Course.  ECG/medicine tests:  Decision-making details documented in ED Course.    Risk  Prescription drug management.                                      Clinical Impression:  Final diagnoses:  [R10.9] Flank pain (Primary)  [I31.39] Pericardial effusion          ED Disposition Condition    Discharge Stable          ED Prescriptions    None       Follow-up Information       Follow up With Specialties Details Why Contact Info Additional Information    [facility] - Emergency Dept Emergency Medicine  If symptoms worsen [address] [address]  [phone] 1st floor    Melony Kim NP Family Medicine In 2 days  [address] [address]  [phone]                Micky Souza MD  11/10/24 1240

## 2024-11-11 ENCOUNTER — TELEPHONE (OUTPATIENT)
Facility: CLINIC | Age: 34
End: 2024-11-11
Payer: MEDICAID

## 2024-11-11 NOTE — ED NOTES
Pt reports being unable to get in contact with family to come pick her up. RN could not reach patients father by phone but did speak with pt's step mother that is listed on contact list. Someone will be coming to  pt for discharge.

## 2024-11-11 NOTE — TELEPHONE ENCOUNTER
I lvm to remind pt of NP appt on 11/18/24   Include Z78.9 (Other Specified Conditions Influencing Health Status) As An Associated Diagnosis?: No Post-Care Instructions: I reviewed with the patient in detail post-care instructions. Patient is to wear sunprotection, and avoid picking at any of the treated lesions. Pt may apply Vaseline to crusted or scabbing areas. Medical Necessity Information: It is in your best interest to select a reason for this procedure from the list below. All of these items fulfill various CMS LCD requirements except the new and changing color options. Medical Necessity Clause: This procedure was medically necessary because the lesions that were treated were: Detail Level: Detailed Consent: The patient's consent was obtained including but not limited to risks of crusting, scabbing, blistering, scarring, darker or lighter pigmentary change, recurrence, incomplete removal and infection.

## 2024-11-11 NOTE — ED NOTES
"Pt is aware that she is being discharged. Pt stated "So soon"?. Pt is aware to call her dad to come pick her up for discharge.   "

## 2024-11-11 NOTE — ED PROVIDER NOTES
"Encounter Date: 11/10/2024       History     Chief Complaint   Patient presents with    Flank Pain     Bilateral flank pain. Was seen at [facility] and [facility] today for same.     34-year-old female with chronic flank pain, ESRD completed dialysis yesterday. Presents to ED for chief complaints of bilateral flank pain. She was seen at 2 other ER today with the same, she had complete unremarkable workup including CBC, CMP, lipase, UA, and CT scans at noon today. CT showed stable pericardial effusion without tamponade. She denies associated chest pain, shortness of breath, nausea, vomiting    The history is provided by the patient. No  was used.     Review of patient's allergies indicates:   Allergen Reactions    Droperidol Hives    Haldol [haloperidol lactate] Hives    Penicillins Hives    Droperidol (bulk) Anxiety     STATES "FREAKS OUT".  TOLERATES HALDOL PRIOR TO ARRIVAL AT ANOTHER FACILITY TODAY.      Past Medical History:   Diagnosis Date    ESRD on hemodialysis 2022    Gastritis 2022    EGD was 22    Gastroparesis 2022    has not had Emptying study    Heart failure with preserved ejection fraction 2022    EF 55% on 3/22    History of supraventricular tachycardia     Hyperkalemia 2022    Hypertensive emergency 2022    Sickle cell trait 2022    Type 2 diabetes mellitus      Past Surgical History:   Procedure Laterality Date     SECTION      x 3    COLONOSCOPY      COLONOSCOPY N/A 2022    Procedure: COLONOSCOPY;  Surgeon: Jagdeep Cedeno MD;  Location: [facility];  Service: Endoscopy;  Laterality: N/A;    DESTRUCTION, CILIARY BODY, USING LASER Left 2024    Procedure: Cyclophotocoagulation G-probe, retrobulbar chlorpromazine left eye;  Surgeon: Fidel Wise MD;  Location: [address];  Service: Ophthalmology;  Laterality: Left;    ENDOSCOPIC ULTRASOUND OF UPPER GASTROINTESTINAL TRACT N/A 2023    Procedure: ULTRASOUND, UPPER GI " TRACT, ENDOSCOPIC;  Surgeon: Micky Paredes III, MD;  Location: [facility] ENDO;  Service: Endoscopy;  Laterality: N/A;    ESOPHAGOGASTRODUODENOSCOPY N/A 10/18/2019    Procedure: ESOPHAGOGASTRODUODENOSCOPY (EGD);  Surgeon: Gianluca Mendez MD;  Location: [facility] ENDO;  Service: Endoscopy;  Laterality: N/A;    ESOPHAGOGASTRODUODENOSCOPY N/A 08/24/2022    Procedure: EGD (ESOPHAGOGASTRODUODENOSCOPY);  Surgeon: Micky Paredes III, MD;  Location: [facility];  Service: Endoscopy;  Laterality: N/A;    ESOPHAGOGASTRODUODENOSCOPY N/A 12/05/2022    Procedure: EGD (ESOPHAGOGASTRODUODENOSCOPY);  Surgeon: Marcelo Zhong MD;  Location: [facility];  Service: Endoscopy;  Laterality: N/A;    ESOPHAGOGASTRODUODENOSCOPY N/A 9/13/2024    Procedure: EGD (ESOPHAGOGASTRODUODENOSCOPY);  Surgeon: Marcelo Zhong MD;  Location: [facility];  Service: Endoscopy;  Laterality: N/A;    EYE SURGERY      INSERTION, CATHETER, TUNNELED N/A 06/17/2023    Procedure: Insertion,catheter,tunneled;  Surgeon: Carlos Thurman Jr., MD;  Location: [facility];  Service: General;  Laterality: N/A;    LAPAROSCOPIC CHOLECYSTECTOMY N/A 07/30/2022    Procedure: CHOLECYSTECTOMY, LAPAROSCOPIC;  Surgeon: Grey Perez MD;  Location: [facility] OR;  Service: General;  Laterality: N/A;    PLACEMENT OF DUAL-LUMEN VASCULAR CATHETER Left 07/12/2022    Procedure: INSERTION-CATHETER-JOSEPH;  Surgeon: Dionte Gan MD;  Location: [facility] OR;  Service: General;  Laterality: Left;    PLACEMENT OF DUAL-LUMEN VASCULAR CATHETER Right 07/26/2022    Procedure: INSERTION-CATHETER-Hemosplit;  Surgeon: Dionte Gan MD;  Location: [facility] OR;  Service: General;  Laterality: Right;     Family History   Problem Relation Name Age of Onset    Diabetes Mother      Diabetes Father       Social History     Tobacco Use    Smoking status: Never    Smokeless tobacco: Never   Substance Use Topics    Alcohol use: Not Currently    Drug use: No     Review of Systems   Constitutional: Negative.     HENT: Negative.     Eyes: Negative.    Respiratory: Negative.     Cardiovascular: Negative.    Gastrointestinal: Negative.    Endocrine: Negative.    Genitourinary:  Positive for flank pain.   Skin: Negative.    Neurological: Negative.    Hematological: Negative.    Psychiatric/Behavioral: Negative.     All other systems reviewed and are negative.      Physical Exam     Initial Vitals [11/10/24 1845]   BP Pulse Resp Temp SpO2   (!) 186/101 90 20 98.3 °F (36.8 °C) 95 %      MAP       --         Physical Exam    Nursing note and vitals reviewed.  Constitutional: She appears well-developed.   HENT:   Head: Normocephalic.   Eyes: Pupils are equal, round, and reactive to light.   Neck: No JVD present.   Normal range of motion.  Cardiovascular:  Normal rate and regular rhythm.     Exam reveals no gallop and no friction rub.       No murmur heard.  Pulmonary/Chest: Breath sounds normal. No respiratory distress. She has no wheezes.   Abdominal: Abdomen is soft. Bowel sounds are normal. She exhibits no distension and no mass. There is no abdominal tenderness. There is no rebound and no guarding.   Musculoskeletal:         General: Normal range of motion.      Cervical back: Normal range of motion.     Neurological: She is alert and oriented to person, place, and time. She has normal strength. GCS score is 15. GCS eye subscore is 4. GCS verbal subscore is 5. GCS motor subscore is 6.   Skin: Skin is warm. Capillary refill takes less than 2 seconds.   Psychiatric: She has a normal mood and affect.         ED Course   Procedures  Labs Reviewed   CBC W/ AUTO DIFFERENTIAL - Abnormal       Result Value    WBC 7.07      RBC 3.02 (*)     Hemoglobin 8.6 (*)     Hematocrit 26.2 (*)     MCV 87      MCH 28.5      MCHC 32.8      RDW 15.7 (*)     Platelets 122 (*)     MPV 10.4      Immature Granulocytes 0.3      Gran # (ANC) 6.1      Immature Grans (Abs) 0.02      Lymph # 0.5 (*)     Mono # 0.3      Eos # 0.1      Baso # 0.03      nRBC 0       Gran % 86.3 (*)     Lymph % 7.2 (*)     Mono % 4.4      Eosinophil % 1.4      Basophil % 0.4      Differential Method Automated     COMPREHENSIVE METABOLIC PANEL - Abnormal    Sodium 145      Potassium 5.2 (*)     Chloride 103      CO2 21 (*)     Glucose 192 (*)     BUN 40 (*)     Creatinine 6.6 (*)     Calcium 10.0      Total Protein 7.2      Albumin 3.7      Total Bilirubin 2.8 (*)     Alkaline Phosphatase 201 (*)     AST 24      ALT 26      eGFR 7.8 (*)     Anion Gap 21 (*)           Imaging Results    None          Medications   HYDROmorphone injection 0.5 mg (0 mg Intravenous Hold 11/10/24 1935)   HYDROmorphone injection 0.5 mg (0.5 mg Intravenous Given 11/10/24 1857)   HYDROmorphone injection 0.5 mg (0.5 mg Intramuscular Given 11/10/24 1952)     Medical Decision Making  34-year-old female with bilateral flank pain.  See H&P above for details, physical exam is benign.  Screening labs including CBC, CMP show no significant gross abnormalities beyond her baseline.  She was treated for chronic pain.  She was re-evaluated she feels better.  I discussed the patient's diagnosis, treatment plan, and plan for discharge with the patient. Patient was given referral instructed to follow up with pain management, and was given strict return precautions to the ED. The patient voiced understanding and agreed with the plan      Amount and/or Complexity of Data Reviewed  Labs: ordered.    Risk  Prescription drug management.                                      Clinical Impression:  Final diagnoses:  [G89.4] Chronic pain syndrome (Primary)          ED Disposition Condition    Discharge Stable          ED Prescriptions    None       Follow-up Information       Follow up With Specialties Details Why Contact Info    Melony Kim NP Family Medicine  As needed [address] [place] [id number]  [phone]               Luis Alfredo Fournier MD  11/10/24 2031

## 2024-11-11 NOTE — ED NOTES
EDP at bedside. Advised that pt was seen and treated, having been medicated for pain and she is now discharged.

## 2024-11-11 NOTE — ED NOTES
Pt advised she has been discharged and her father is here to get here. Pt anxious and crying out- asking to speak with EDP.

## 2024-11-11 NOTE — ED NOTES
Pt reports abd pain that began this morning. Pt reports being seen at another ED this morning. Pt noted cannot lay still in bed.

## 2024-11-13 LAB — BACTERIA UR CULT: NO GROWTH

## 2024-11-17 ENCOUNTER — HOSPITAL ENCOUNTER (OUTPATIENT)
Facility: HOSPITAL | Age: 34
Discharge: HOME | End: 2024-11-18
Attending: EMERGENCY MEDICINE | Admitting: STUDENT IN AN ORGANIZED HEALTH CARE EDUCATION/TRAINING PROGRAM
Payer: MEDICAID

## 2024-11-17 DIAGNOSIS — E87.70: ICD-10-CM

## 2024-11-17 DIAGNOSIS — M25.552 BILATERAL HIP PAIN: ICD-10-CM

## 2024-11-17 DIAGNOSIS — I51.7 CARDIOMEGALY: ICD-10-CM

## 2024-11-17 DIAGNOSIS — R10.9 FLANK PAIN: Primary | ICD-10-CM

## 2024-11-17 DIAGNOSIS — K31.84 GASTROPARESIS: Chronic | ICD-10-CM

## 2024-11-17 DIAGNOSIS — N18.6 ANEMIA IN ESRD (END-STAGE RENAL DISEASE): ICD-10-CM

## 2024-11-17 DIAGNOSIS — J81.0 ACUTE PULMONARY EDEMA: ICD-10-CM

## 2024-11-17 DIAGNOSIS — D63.1 ANEMIA IN ESRD (END-STAGE RENAL DISEASE): ICD-10-CM

## 2024-11-17 DIAGNOSIS — Z91.158 DIALYSIS PATIENT, NONCOMPLIANT: ICD-10-CM

## 2024-11-17 DIAGNOSIS — M25.551 BILATERAL HIP PAIN: ICD-10-CM

## 2024-11-17 DIAGNOSIS — Z91.158 NONCOMPLIANCE WITH RENAL DIALYSIS: ICD-10-CM

## 2024-11-17 DIAGNOSIS — N18.6 ESRD (END STAGE RENAL DISEASE): ICD-10-CM

## 2024-11-17 DIAGNOSIS — E87.5 HYPERKALEMIA: ICD-10-CM

## 2024-11-17 LAB
ABO + RH BLD: NORMAL
ANION GAP SERPL CALC-SCNC: 17 MMOL/L (ref 8–16)
ANION GAP SERPL CALC-SCNC: 19 MMOL/L (ref 8–16)
ANION GAP SERPL CALC-SCNC: 22 MMOL/L (ref 8–16)
BASOPHILS # BLD AUTO: 0.03 K/UL (ref 0–0.2)
BASOPHILS # BLD AUTO: 0.04 K/UL (ref 0–0.2)
BASOPHILS NFR BLD: 0.3 % (ref 0–1.9)
BASOPHILS NFR BLD: 0.3 % (ref 0–1.9)
BLD GP AB SCN CELLS X3 SERPL QL: NORMAL
BUN SERPL-MCNC: 100 MG/DL (ref 6–20)
BUN SERPL-MCNC: 41 MG/DL (ref 6–20)
BUN SERPL-MCNC: 99 MG/DL (ref 6–20)
CALCIUM SERPL-MCNC: 9.5 MG/DL (ref 8.7–10.5)
CALCIUM SERPL-MCNC: 9.7 MG/DL (ref 8.7–10.5)
CALCIUM SERPL-MCNC: 9.7 MG/DL (ref 8.7–10.5)
CHLORIDE SERPL-SCNC: 105 MMOL/L (ref 95–110)
CHLORIDE SERPL-SCNC: 107 MMOL/L (ref 95–110)
CHLORIDE SERPL-SCNC: 99 MMOL/L (ref 95–110)
CO2 SERPL-SCNC: 17 MMOL/L (ref 23–29)
CO2 SERPL-SCNC: 17 MMOL/L (ref 23–29)
CO2 SERPL-SCNC: 19 MMOL/L (ref 23–29)
CREAT SERPL-MCNC: 12.1 MG/DL (ref 0.5–1.4)
CREAT SERPL-MCNC: 12.4 MG/DL (ref 0.5–1.4)
CREAT SERPL-MCNC: 6.2 MG/DL (ref 0.5–1.4)
DIFFERENTIAL METHOD BLD: ABNORMAL
DIFFERENTIAL METHOD BLD: ABNORMAL
EOSINOPHIL # BLD AUTO: 0.1 K/UL (ref 0–0.5)
EOSINOPHIL # BLD AUTO: 0.3 K/UL (ref 0–0.5)
EOSINOPHIL NFR BLD: 1 % (ref 0–8)
EOSINOPHIL NFR BLD: 2.3 % (ref 0–8)
ERYTHROCYTE [DISTWIDTH] IN BLOOD BY AUTOMATED COUNT: 16.7 % (ref 11.5–14.5)
ERYTHROCYTE [DISTWIDTH] IN BLOOD BY AUTOMATED COUNT: 16.8 % (ref 11.5–14.5)
EST. GFR  (NO RACE VARIABLE): 3.7 ML/MIN/1.73 M^2
EST. GFR  (NO RACE VARIABLE): 3.8 ML/MIN/1.73 M^2
EST. GFR  (NO RACE VARIABLE): 8.4 ML/MIN/1.73 M^2
GLUCOSE SERPL-MCNC: 130 MG/DL (ref 70–110)
GLUCOSE SERPL-MCNC: 131 MG/DL (ref 70–110)
GLUCOSE SERPL-MCNC: 179 MG/DL (ref 70–110)
GLUCOSE SERPL-MCNC: 99 MG/DL (ref 70–110)
HCT VFR BLD AUTO: 22.5 % (ref 37–48.5)
HCT VFR BLD AUTO: 24.1 % (ref 37–48.5)
HCT VFR BLD AUTO: 25.4 % (ref 37–48.5)
HGB BLD-MCNC: 7.3 G/DL (ref 12–16)
HGB BLD-MCNC: 7.4 G/DL (ref 12–16)
HGB BLD-MCNC: 8.2 G/DL (ref 12–16)
IMM GRANULOCYTES # BLD AUTO: 0.04 K/UL (ref 0–0.04)
IMM GRANULOCYTES # BLD AUTO: 0.06 K/UL (ref 0–0.04)
IMM GRANULOCYTES NFR BLD AUTO: 0.3 % (ref 0–0.5)
IMM GRANULOCYTES NFR BLD AUTO: 0.5 % (ref 0–0.5)
LYMPHOCYTES # BLD AUTO: 0.9 K/UL (ref 1–4.8)
LYMPHOCYTES # BLD AUTO: 0.9 K/UL (ref 1–4.8)
LYMPHOCYTES NFR BLD: 7.4 % (ref 18–48)
LYMPHOCYTES NFR BLD: 7.5 % (ref 18–48)
MAGNESIUM SERPL-MCNC: 2.6 MG/DL (ref 1.6–2.6)
MCH RBC QN AUTO: 27.5 PG (ref 27–31)
MCH RBC QN AUTO: 28.7 PG (ref 27–31)
MCHC RBC AUTO-ENTMCNC: 30.7 G/DL (ref 32–36)
MCHC RBC AUTO-ENTMCNC: 32.3 G/DL (ref 32–36)
MCV RBC AUTO: 89 FL (ref 82–98)
MCV RBC AUTO: 90 FL (ref 82–98)
MONOCYTES # BLD AUTO: 1.1 K/UL (ref 0.3–1)
MONOCYTES # BLD AUTO: 1.1 K/UL (ref 0.3–1)
MONOCYTES NFR BLD: 8.7 % (ref 4–15)
MONOCYTES NFR BLD: 9.3 % (ref 4–15)
NEUTROPHILS # BLD AUTO: 10 K/UL (ref 1.8–7.7)
NEUTROPHILS # BLD AUTO: 9.3 K/UL (ref 1.8–7.7)
NEUTROPHILS NFR BLD: 81 % (ref 38–73)
NEUTROPHILS NFR BLD: 81.4 % (ref 38–73)
NRBC BLD-RTO: 0 /100 WBC
NRBC BLD-RTO: 0 /100 WBC
PHOSPHATE SERPL-MCNC: 7.9 MG/DL (ref 2.7–4.5)
PLATELET # BLD AUTO: 113 K/UL (ref 150–450)
PLATELET # BLD AUTO: 126 K/UL (ref 150–450)
PMV BLD AUTO: 10.9 FL (ref 9.2–12.9)
PMV BLD AUTO: 10.9 FL (ref 9.2–12.9)
POCT GLUCOSE: 122 MG/DL (ref 70–110)
POCT GLUCOSE: 135 MG/DL (ref 70–110)
POCT GLUCOSE: 186 MG/DL (ref 70–110)
POCT GLUCOSE: 189 MG/DL (ref 70–110)
POCT GLUCOSE: 23 MG/DL (ref 70–110)
POCT GLUCOSE: 88 MG/DL (ref 70–110)
POTASSIUM SERPL-SCNC: 4.9 MMOL/L (ref 3.5–5.1)
POTASSIUM SERPL-SCNC: 6.2 MMOL/L (ref 3.5–5.1)
POTASSIUM SERPL-SCNC: 7.1 MMOL/L (ref 3.5–5.1)
RBC # BLD AUTO: 2.69 M/UL (ref 4–5.4)
RBC # BLD AUTO: 2.86 M/UL (ref 4–5.4)
SODIUM SERPL-SCNC: 140 MMOL/L (ref 136–145)
SODIUM SERPL-SCNC: 141 MMOL/L (ref 136–145)
SODIUM SERPL-SCNC: 141 MMOL/L (ref 136–145)
SPECIMEN OUTDATE: NORMAL
WBC # BLD AUTO: 11.43 K/UL (ref 3.9–12.7)
WBC # BLD AUTO: 12.38 K/UL (ref 3.9–12.7)

## 2024-11-17 PROCEDURE — 85014 HEMATOCRIT: CPT | Performed by: NURSE PRACTITIONER

## 2024-11-17 PROCEDURE — G0257 UNSCHED DIALYSIS ESRD PT HOS: HCPCS

## 2024-11-17 PROCEDURE — 63600175 PHARM REV CODE 636 W HCPCS: Performed by: NURSE PRACTITIONER

## 2024-11-17 PROCEDURE — 99291 CRITICAL CARE FIRST HOUR: CPT

## 2024-11-17 PROCEDURE — 94761 N-INVAS EAR/PLS OXIMETRY MLT: CPT

## 2024-11-17 PROCEDURE — 80048 BASIC METABOLIC PNL TOTAL CA: CPT | Mod: 91 | Performed by: STUDENT IN AN ORGANIZED HEALTH CARE EDUCATION/TRAINING PROGRAM

## 2024-11-17 PROCEDURE — 80048 BASIC METABOLIC PNL TOTAL CA: CPT | Mod: 91 | Performed by: NURSE PRACTITIONER

## 2024-11-17 PROCEDURE — 99900031 HC PATIENT EDUCATION (STAT)

## 2024-11-17 PROCEDURE — 85025 COMPLETE CBC W/AUTO DIFF WBC: CPT | Performed by: EMERGENCY MEDICINE

## 2024-11-17 PROCEDURE — G0378 HOSPITAL OBSERVATION PER HR: HCPCS

## 2024-11-17 PROCEDURE — 93010 ELECTROCARDIOGRAM REPORT: CPT | Mod: ,,, | Performed by: INTERNAL MEDICINE

## 2024-11-17 PROCEDURE — 25000242 PHARM REV CODE 250 ALT 637 W/ HCPCS: Performed by: EMERGENCY MEDICINE

## 2024-11-17 PROCEDURE — 27000221 HC OXYGEN, UP TO 24 HOURS

## 2024-11-17 PROCEDURE — 25000003 PHARM REV CODE 250: Performed by: NURSE PRACTITIONER

## 2024-11-17 PROCEDURE — 25000003 PHARM REV CODE 250: Mod: JZ,JG | Performed by: EMERGENCY MEDICINE

## 2024-11-17 PROCEDURE — 63600175 PHARM REV CODE 636 W HCPCS: Performed by: EMERGENCY MEDICINE

## 2024-11-17 PROCEDURE — 36415 COLL VENOUS BLD VENIPUNCTURE: CPT | Performed by: STUDENT IN AN ORGANIZED HEALTH CARE EDUCATION/TRAINING PROGRAM

## 2024-11-17 PROCEDURE — 94644 CONT INHLJ TX 1ST HOUR: CPT

## 2024-11-17 PROCEDURE — 36415 COLL VENOUS BLD VENIPUNCTURE: CPT | Performed by: INTERNAL MEDICINE

## 2024-11-17 PROCEDURE — 96372 THER/PROPH/DIAG INJ SC/IM: CPT | Performed by: STUDENT IN AN ORGANIZED HEALTH CARE EDUCATION/TRAINING PROGRAM

## 2024-11-17 PROCEDURE — 25000003 PHARM REV CODE 250: Performed by: STUDENT IN AN ORGANIZED HEALTH CARE EDUCATION/TRAINING PROGRAM

## 2024-11-17 PROCEDURE — 96375 TX/PRO/DX INJ NEW DRUG ADDON: CPT

## 2024-11-17 PROCEDURE — 25000003 PHARM REV CODE 250: Performed by: INTERNAL MEDICINE

## 2024-11-17 PROCEDURE — 86920 COMPATIBILITY TEST SPIN: CPT | Performed by: INTERNAL MEDICINE

## 2024-11-17 PROCEDURE — 85025 COMPLETE CBC W/AUTO DIFF WBC: CPT | Mod: 91 | Performed by: STUDENT IN AN ORGANIZED HEALTH CARE EDUCATION/TRAINING PROGRAM

## 2024-11-17 PROCEDURE — 84100 ASSAY OF PHOSPHORUS: CPT | Performed by: STUDENT IN AN ORGANIZED HEALTH CARE EDUCATION/TRAINING PROGRAM

## 2024-11-17 PROCEDURE — 93005 ELECTROCARDIOGRAM TRACING: CPT | Performed by: INTERNAL MEDICINE

## 2024-11-17 PROCEDURE — 82947 ASSAY GLUCOSE BLOOD QUANT: CPT | Performed by: STUDENT IN AN ORGANIZED HEALTH CARE EDUCATION/TRAINING PROGRAM

## 2024-11-17 PROCEDURE — 90935 HEMODIALYSIS ONE EVALUATION: CPT

## 2024-11-17 PROCEDURE — 96365 THER/PROPH/DIAG IV INF INIT: CPT

## 2024-11-17 PROCEDURE — 36415 COLL VENOUS BLD VENIPUNCTURE: CPT | Performed by: NURSE PRACTITIONER

## 2024-11-17 PROCEDURE — 94799 UNLISTED PULMONARY SVC/PX: CPT

## 2024-11-17 PROCEDURE — 83735 ASSAY OF MAGNESIUM: CPT | Performed by: STUDENT IN AN ORGANIZED HEALTH CARE EDUCATION/TRAINING PROGRAM

## 2024-11-17 PROCEDURE — 82962 GLUCOSE BLOOD TEST: CPT

## 2024-11-17 PROCEDURE — 86850 RBC ANTIBODY SCREEN: CPT | Performed by: INTERNAL MEDICINE

## 2024-11-17 PROCEDURE — 99900035 HC TECH TIME PER 15 MIN (STAT)

## 2024-11-17 PROCEDURE — 63600175 PHARM REV CODE 636 W HCPCS: Performed by: STUDENT IN AN ORGANIZED HEALTH CARE EDUCATION/TRAINING PROGRAM

## 2024-11-17 PROCEDURE — 96376 TX/PRO/DX INJ SAME DRUG ADON: CPT

## 2024-11-17 PROCEDURE — 85018 HEMOGLOBIN: CPT | Performed by: NURSE PRACTITIONER

## 2024-11-17 PROCEDURE — 80048 BASIC METABOLIC PNL TOTAL CA: CPT | Performed by: EMERGENCY MEDICINE

## 2024-11-17 RX ORDER — GABAPENTIN 300 MG/1
300 CAPSULE ORAL DAILY
Status: DISCONTINUED | OUTPATIENT
Start: 2024-11-17 | End: 2024-11-17

## 2024-11-17 RX ORDER — SODIUM,POTASSIUM PHOSPHATES 280-250MG
2 POWDER IN PACKET (EA) ORAL
Status: DISCONTINUED | OUTPATIENT
Start: 2024-11-17 | End: 2024-11-18 | Stop reason: HOSPADM

## 2024-11-17 RX ORDER — NALOXONE HCL 0.4 MG/ML
0.02 VIAL (ML) INJECTION
Status: DISCONTINUED | OUTPATIENT
Start: 2024-11-17 | End: 2024-11-17

## 2024-11-17 RX ORDER — PROMETHAZINE HYDROCHLORIDE 25 MG/1
25 TABLET ORAL EVERY 6 HOURS PRN
Status: DISCONTINUED | OUTPATIENT
Start: 2024-11-17 | End: 2024-11-18 | Stop reason: HOSPADM

## 2024-11-17 RX ORDER — ACETAMINOPHEN 325 MG/1
650 TABLET ORAL EVERY 4 HOURS PRN
Status: DISCONTINUED | OUTPATIENT
Start: 2024-11-17 | End: 2024-11-18 | Stop reason: HOSPADM

## 2024-11-17 RX ORDER — TALC
9 POWDER (GRAM) TOPICAL NIGHTLY PRN
Status: DISCONTINUED | OUTPATIENT
Start: 2024-11-17 | End: 2024-11-18 | Stop reason: HOSPADM

## 2024-11-17 RX ORDER — LANOLIN ALCOHOL/MO/W.PET/CERES
800 CREAM (GRAM) TOPICAL
Status: DISCONTINUED | OUTPATIENT
Start: 2024-11-17 | End: 2024-11-18 | Stop reason: HOSPADM

## 2024-11-17 RX ORDER — SODIUM BICARBONATE 650 MG/1
650 TABLET ORAL 3 TIMES DAILY
Status: DISCONTINUED | OUTPATIENT
Start: 2024-11-17 | End: 2024-11-18 | Stop reason: HOSPADM

## 2024-11-17 RX ORDER — LEVETIRACETAM 500 MG/1
500 TABLET ORAL 2 TIMES DAILY
Status: DISCONTINUED | OUTPATIENT
Start: 2024-11-17 | End: 2024-11-18 | Stop reason: HOSPADM

## 2024-11-17 RX ORDER — HYDROXYZINE HYDROCHLORIDE 10 MG/1
10 TABLET, FILM COATED ORAL 3 TIMES DAILY PRN
Status: DISCONTINUED | OUTPATIENT
Start: 2024-11-17 | End: 2024-11-18 | Stop reason: HOSPADM

## 2024-11-17 RX ORDER — GLUCAGON 1 MG
1 KIT INJECTION
Status: DISCONTINUED | OUTPATIENT
Start: 2024-11-17 | End: 2024-11-18 | Stop reason: HOSPADM

## 2024-11-17 RX ORDER — HYDROCODONE BITARTRATE AND ACETAMINOPHEN 5; 325 MG/1; MG/1
1 TABLET ORAL EVERY 4 HOURS PRN
Status: DISCONTINUED | OUTPATIENT
Start: 2024-11-17 | End: 2024-11-18 | Stop reason: HOSPADM

## 2024-11-17 RX ORDER — MUPIROCIN 20 MG/G
OINTMENT TOPICAL 2 TIMES DAILY
Status: DISCONTINUED | OUTPATIENT
Start: 2024-11-17 | End: 2024-11-18 | Stop reason: HOSPADM

## 2024-11-17 RX ORDER — LIDOCAINE 50 MG/G
1 PATCH TOPICAL
Status: DISCONTINUED | OUTPATIENT
Start: 2024-11-18 | End: 2024-11-18 | Stop reason: HOSPADM

## 2024-11-17 RX ORDER — BRIMONIDINE TARTRATE 1.5 MG/ML
1 SOLUTION/ DROPS OPHTHALMIC 3 TIMES DAILY
Status: DISCONTINUED | OUTPATIENT
Start: 2024-11-17 | End: 2024-11-18 | Stop reason: HOSPADM

## 2024-11-17 RX ORDER — SEVELAMER CARBONATE 800 MG/1
800 TABLET, FILM COATED ORAL
Status: DISCONTINUED | OUTPATIENT
Start: 2024-11-17 | End: 2024-11-18 | Stop reason: HOSPADM

## 2024-11-17 RX ORDER — LIDOCAINE 50 MG/G
1 PATCH TOPICAL
Status: DISCONTINUED | OUTPATIENT
Start: 2024-11-17 | End: 2024-11-17

## 2024-11-17 RX ORDER — PANTOPRAZOLE SODIUM 40 MG/1
40 TABLET, DELAYED RELEASE ORAL
Status: DISCONTINUED | OUTPATIENT
Start: 2024-11-17 | End: 2024-11-18 | Stop reason: HOSPADM

## 2024-11-17 RX ORDER — IBUPROFEN 200 MG
24 TABLET ORAL
Status: DISCONTINUED | OUTPATIENT
Start: 2024-11-17 | End: 2024-11-18 | Stop reason: HOSPADM

## 2024-11-17 RX ORDER — AMOXICILLIN 250 MG
1 CAPSULE ORAL 2 TIMES DAILY
Status: DISCONTINUED | OUTPATIENT
Start: 2024-11-17 | End: 2024-11-18 | Stop reason: HOSPADM

## 2024-11-17 RX ORDER — ACETAMINOPHEN 500 MG
1000 TABLET ORAL EVERY 8 HOURS PRN
Status: DISCONTINUED | OUTPATIENT
Start: 2024-11-17 | End: 2024-11-18 | Stop reason: HOSPADM

## 2024-11-17 RX ORDER — HYDROMORPHONE HYDROCHLORIDE 1 MG/ML
0.5 INJECTION, SOLUTION INTRAMUSCULAR; INTRAVENOUS; SUBCUTANEOUS ONCE
Status: COMPLETED | OUTPATIENT
Start: 2024-11-17 | End: 2024-11-17

## 2024-11-17 RX ORDER — ALBUTEROL SULFATE 0.83 MG/ML
10 SOLUTION RESPIRATORY (INHALATION)
Status: COMPLETED | OUTPATIENT
Start: 2024-11-17 | End: 2024-11-17

## 2024-11-17 RX ORDER — INSULIN ASPART 100 [IU]/ML
0-5 INJECTION, SOLUTION INTRAVENOUS; SUBCUTANEOUS
Status: DISCONTINUED | OUTPATIENT
Start: 2024-11-17 | End: 2024-11-18 | Stop reason: HOSPADM

## 2024-11-17 RX ORDER — SODIUM CHLORIDE 9 MG/ML
INJECTION, SOLUTION INTRAVENOUS ONCE
Status: DISCONTINUED | OUTPATIENT
Start: 2024-11-17 | End: 2024-11-17

## 2024-11-17 RX ORDER — HYDROCODONE BITARTRATE AND ACETAMINOPHEN 5; 325 MG/1; MG/1
1 TABLET ORAL EVERY 8 HOURS PRN
Status: DISCONTINUED | OUTPATIENT
Start: 2024-11-17 | End: 2024-11-17

## 2024-11-17 RX ORDER — NALOXONE HCL 0.4 MG/ML
0.4 VIAL (ML) INJECTION
Status: DISCONTINUED | OUTPATIENT
Start: 2024-11-17 | End: 2024-11-18 | Stop reason: HOSPADM

## 2024-11-17 RX ORDER — IBUPROFEN 200 MG
16 TABLET ORAL
Status: DISCONTINUED | OUTPATIENT
Start: 2024-11-17 | End: 2024-11-18 | Stop reason: HOSPADM

## 2024-11-17 RX ORDER — LIDOCAINE 50 MG/G
1 PATCH TOPICAL DAILY PRN
Status: DISCONTINUED | OUTPATIENT
Start: 2024-11-17 | End: 2024-11-18 | Stop reason: HOSPADM

## 2024-11-17 RX ORDER — CALCIUM GLUCONATE 20 MG/ML
1 INJECTION, SOLUTION INTRAVENOUS
Status: COMPLETED | OUTPATIENT
Start: 2024-11-17 | End: 2024-11-17

## 2024-11-17 RX ORDER — ACETAMINOPHEN 500 MG
1000 TABLET ORAL
Status: COMPLETED | OUTPATIENT
Start: 2024-11-17 | End: 2024-11-17

## 2024-11-17 RX ORDER — SODIUM CHLORIDE 0.9 % (FLUSH) 0.9 %
10 SYRINGE (ML) INJECTION EVERY 8 HOURS PRN
Status: DISCONTINUED | OUTPATIENT
Start: 2024-11-17 | End: 2024-11-18 | Stop reason: HOSPADM

## 2024-11-17 RX ORDER — INSULIN ASPART 100 [IU]/ML
8 INJECTION, SOLUTION INTRAVENOUS; SUBCUTANEOUS
Status: DISCONTINUED | OUTPATIENT
Start: 2024-11-17 | End: 2024-11-18 | Stop reason: HOSPADM

## 2024-11-17 RX ORDER — HYDRALAZINE HYDROCHLORIDE 25 MG/1
50 TABLET, FILM COATED ORAL EVERY 8 HOURS
Status: DISCONTINUED | OUTPATIENT
Start: 2024-11-17 | End: 2024-11-18 | Stop reason: HOSPADM

## 2024-11-17 RX ADMIN — SENNOSIDES AND DOCUSATE SODIUM 1 TABLET: 8.6; 5 TABLET ORAL at 08:11

## 2024-11-17 RX ADMIN — BRIMONIDINE TARTRATE 1 DROP: 1.5 SOLUTION/ DROPS OPHTHALMIC at 02:11

## 2024-11-17 RX ADMIN — HYDRALAZINE HYDROCHLORIDE 50 MG: 25 TABLET ORAL at 02:11

## 2024-11-17 RX ADMIN — DEXTROSE MONOHYDRATE 25 G: 25 INJECTION, SOLUTION INTRAVENOUS at 03:11

## 2024-11-17 RX ADMIN — LEVETIRACETAM 500 MG: 500 TABLET, FILM COATED ORAL at 08:11

## 2024-11-17 RX ADMIN — SODIUM BICARBONATE 650 MG TABLET 650 MG: at 08:11

## 2024-11-17 RX ADMIN — MUPIROCIN 1 G: 20 OINTMENT TOPICAL at 09:11

## 2024-11-17 RX ADMIN — HYDROCODONE BITARTRATE AND ACETAMINOPHEN 1 TABLET: 5; 325 TABLET ORAL at 02:11

## 2024-11-17 RX ADMIN — APIXABAN 2.5 MG: 2.5 TABLET, FILM COATED ORAL at 08:11

## 2024-11-17 RX ADMIN — INSULIN ASPART 8 UNITS: 100 INJECTION, SOLUTION INTRAVENOUS; SUBCUTANEOUS at 04:11

## 2024-11-17 RX ADMIN — SODIUM BICARBONATE 650 MG TABLET 650 MG: at 02:11

## 2024-11-17 RX ADMIN — HYDROMORPHONE HYDROCHLORIDE 0.5 MG: 1 INJECTION, SOLUTION INTRAMUSCULAR; INTRAVENOUS; SUBCUTANEOUS at 10:11

## 2024-11-17 RX ADMIN — SEVELAMER CARBONATE 800 MG: 800 TABLET, FILM COATED ORAL at 04:11

## 2024-11-17 RX ADMIN — MUPIROCIN 1 G: 20 OINTMENT TOPICAL at 08:11

## 2024-11-17 RX ADMIN — HYDRALAZINE HYDROCHLORIDE 50 MG: 25 TABLET ORAL at 09:11

## 2024-11-17 RX ADMIN — BRIMONIDINE TARTRATE 1 DROP: 1.5 SOLUTION/ DROPS OPHTHALMIC at 09:11

## 2024-11-17 RX ADMIN — LIDOCAINE 5% 1 PATCH: 700 PATCH TOPICAL at 01:11

## 2024-11-17 RX ADMIN — CALCIUM GLUCONATE 1 G: 20 INJECTION, SOLUTION INTRAVENOUS at 03:11

## 2024-11-17 RX ADMIN — DEXTROSE MONOHYDRATE 25 G: 25 INJECTION, SOLUTION INTRAVENOUS at 07:11

## 2024-11-17 RX ADMIN — SEVELAMER CARBONATE 800 MG: 800 TABLET, FILM COATED ORAL at 12:11

## 2024-11-17 RX ADMIN — SENNOSIDES AND DOCUSATE SODIUM 1 TABLET: 8.6; 5 TABLET ORAL at 09:11

## 2024-11-17 RX ADMIN — LEVETIRACETAM 500 MG: 500 TABLET, FILM COATED ORAL at 09:11

## 2024-11-17 RX ADMIN — ACETAMINOPHEN 1000 MG: 500 TABLET, FILM COATED ORAL at 01:11

## 2024-11-17 RX ADMIN — INSULIN HUMAN 5 UNITS: 100 INJECTION, SOLUTION PARENTERAL at 03:11

## 2024-11-17 RX ADMIN — HYDROCODONE BITARTRATE AND ACETAMINOPHEN 1 TABLET: 5; 325 TABLET ORAL at 06:11

## 2024-11-17 RX ADMIN — SEVELAMER CARBONATE 800 MG: 800 TABLET, FILM COATED ORAL at 08:11

## 2024-11-17 RX ADMIN — SODIUM BICARBONATE 650 MG TABLET 650 MG: at 09:11

## 2024-11-17 RX ADMIN — HYDROCODONE BITARTRATE AND ACETAMINOPHEN 1 TABLET: 5; 325 TABLET ORAL at 09:11

## 2024-11-17 RX ADMIN — ALBUTEROL SULFATE 10 MG: 2.5 SOLUTION RESPIRATORY (INHALATION) at 03:11

## 2024-11-17 RX ADMIN — APIXABAN 2.5 MG: 2.5 TABLET, FILM COATED ORAL at 09:11

## 2024-11-17 RX ADMIN — HYDROXYZINE HYDROCHLORIDE 10 MG: 10 TABLET ORAL at 12:11

## 2024-11-17 NOTE — DISCHARGE INSTRUCTIONS
- You may benefit from SLOWLY wean (reduce or cut ) your gabapentin dose so that you can eventually stop it due to your poor kidney functions.  - Please discuss this with your prescriber - you may be able to start by taking less each day until you are able to completely stop  - Follow up with pain management    Complete medications as ordered  Follow all discharge instructions.  Please schedule follow up appointments as necessary      When to Call Your Doctor  Call your doctor immediately if you have any of the following:  Severe headache  Severe dizziness, or fainting  Nausea or vomiting  Fast heartbeat (higher than 100 beats per minute)  Fever or chills  Swollen ankles  Weakness  Chest Pain attacks that last longer, occur more often, or are more severe than in the past

## 2024-11-17 NOTE — HOSPITAL COURSE
"34 y.o. female patient with Hx ESRD,hypertension, anemia of chronic (renal) disease, sickle cell trait, DMII, and chronic pain admitted after presenting for evaluation of BL flank pain. She was noted to have missed 2 dialysis sessions as well as hyperkalemia and anemia. Nephrology was consulted and patient received emergency dialysis due to hyperkalemia. Nephrology plans to repeat dialysis tomorrow and transfuse one unit of blood while on dialysis. Patient tells me that she is unsure of her medications that she takes at home. She points to her pain as posterior hip. XR hips BL ordered. XR hips no acute abnormalities. Patient received dialysis again and was transfused blood. Hgb improved to to 10.2. Patient 's mag and potassium normalized. No episodes of vomiting. Tolerating meals. She does complain of non-specific pain which is noted to be chronic. She will have ambulatory pain management referral placed at discharge. This appears to be the 6th time over the past 6 months that she has been referred. Also important to mention is that the [facility] site notes gabapentin 300 tid prescibed on a regular basis. Patients creatine clearance is Estimated Creatinine Clearance: 15.8 mL/min (A) (based on SCr of 4.1 mg/dL (H)). It is recommended that her gabapentin be weaned to a more suitable dosage to avoid complications in this ESRD patient.     Of note: it has also been previously documented that "The etiology of her chronic abdominal pain is likely secondary to gastroparesis for which the patient would need a gastric emptying study in the outpatient setting. She likely also will need a referral to Pain Management which was placed on discharge (multiple referrals have been placed in the past). She should follow up with the Discharge Clinic. Further inpatient/observation admissions are not likely to provide benefit in working up and treating the chronic pain." Additionally, GI has documented on multiple occasions " "similar, "Symptomatic management for nausea - Recommend minimize/discontinue narcotics as they are likely worsening the patient's motility which may be the reason for her symptoms. (10/2022)" Has had 6 CT abdomen scans over the past 11 months and one CT renal Stone abd pelvis. Patient today is otherwise stable including stable vitals, awake and alert without any new acute issues. Breathing unlabored and sats stable on room air. Able to converse in complete sentences. She has been dialyzed 2 times and will return to her usual dialysis schedule. Discharge to home.  "

## 2024-11-17 NOTE — SUBJECTIVE & OBJECTIVE
Past Medical History:   Diagnosis Date    ESRD on hemodialysis 2022    Gastritis 2022    EGD was 22    Gastroparesis 2022    has not had Emptying study    Heart failure with preserved ejection fraction 2022    EF 55% on 3/22    History of supraventricular tachycardia     Hyperkalemia 2022    Hypertensive emergency 2022    Sickle cell trait 2022    Type 2 diabetes mellitus        Past Surgical History:   Procedure Laterality Date     SECTION      x 3    COLONOSCOPY      COLONOSCOPY N/A 2022    Procedure: COLONOSCOPY;  Surgeon: Jagdeep Cedeno MD;  Location: [facility];  Service: Endoscopy;  Laterality: N/A;    DESTRUCTION, CILIARY BODY, USING LASER Left 2024    Procedure: Cyclophotocoagulation G-probe, retrobulbar chlorpromazine left eye;  Surgeon: Fidel Wise MD;  Location: [address];  Service: Ophthalmology;  Laterality: Left;    ENDOSCOPIC ULTRASOUND OF UPPER GASTROINTESTINAL TRACT N/A 2023    Procedure: ULTRASOUND, UPPER GI TRACT, ENDOSCOPIC;  Surgeon: Micky Paredes III, MD;  Location: [facility];  Service: Endoscopy;  Laterality: N/A;    ESOPHAGOGASTRODUODENOSCOPY N/A 10/18/2019    Procedure: ESOPHAGOGASTRODUODENOSCOPY (EGD);  Surgeon: Gianluca Mendez MD;  Location: [facility];  Service: Endoscopy;  Laterality: N/A;    ESOPHAGOGASTRODUODENOSCOPY N/A 2022    Procedure: EGD (ESOPHAGOGASTRODUODENOSCOPY);  Surgeon: Micky Paredes III, MD;  Location: [facility];  Service: Endoscopy;  Laterality: N/A;    ESOPHAGOGASTRODUODENOSCOPY N/A 2022    Procedure: EGD (ESOPHAGOGASTRODUODENOSCOPY);  Surgeon: Marcelo Zhong MD;  Location: [facility];  Service: Endoscopy;  Laterality: N/A;    ESOPHAGOGASTRODUODENOSCOPY N/A 2024    Procedure: EGD (ESOPHAGOGASTRODUODENOSCOPY);  Surgeon: Marcelo Zhong MD;  Location: [facility];  Service: Endoscopy;  Laterality: N/A;    EYE SURGERY      INSERTION, CATHETER, TUNNELED N/A  "06/17/2023    Procedure: Insertion,catheter,tunneled;  Surgeon: Carlos Thurman Jr., MD;  Location: [facility] OR;  Service: General;  Laterality: N/A;    LAPAROSCOPIC CHOLECYSTECTOMY N/A 07/30/2022    Procedure: CHOLECYSTECTOMY, LAPAROSCOPIC;  Surgeon: Grey Perez MD;  Location: [facility] OR;  Service: General;  Laterality: N/A;    PLACEMENT OF DUAL-LUMEN VASCULAR CATHETER Left 07/12/2022    Procedure: INSERTION-CATHETER-JOSEPH;  Surgeon: Dionte Gan MD;  Location: [facility];  Service: General;  Laterality: Left;    PLACEMENT OF DUAL-LUMEN VASCULAR CATHETER Right 07/26/2022    Procedure: INSERTION-CATHETER-Hemosplit;  Surgeon: Dionte Gan MD;  Location: [facility] OR;  Service: General;  Laterality: Right;       Review of patient's allergies indicates:   Allergen Reactions    Droperidol Hives    Haldol [haloperidol lactate] Hives    Penicillins Hives    Droperidol (bulk) Anxiety     STATES "FREAKS OUT".  TOLERATES HALDOL PRIOR TO ARRIVAL AT ANOTHER FACILITY TODAY.        No current facility-administered medications on file prior to encounter.     Current Outpatient Medications on File Prior to Encounter   Medication Sig    apixaban (ELIQUIS) 5 mg Tab Take 1 tablet (5 mg total) by mouth 2 (two) times daily. Patient w/ anemia, so held during admission, follow-up with hematologist about restarting    atropine 1% (ISOPTO ATROPINE) 1 % Drop Place 1 drop into the left eye 2 (two) times a day.    blood sugar diagnostic (TRUE METRIX GLUCOSE TEST STRIP) Strp Use 1 strip to check blood glucose three times daily    blood-glucose meter (TRUE METRIX GLUCOSE METER) Misc Use to check blood glucose    blood-glucose meter,continuous Misc USE WITH SENSORS    blood-glucose sensor (DEXCOM G7 SENSOR) Heather Use as directed for CGM. Change sensor every 10 days    blood-glucose sensor Heather CHANGE EVERY 10 DAYS    brimonidine 0.15 % OPTH DROP (ALPHAGAN) 0.15 % ophthalmic solution Place 1 drop into both eyes 3 (three) times daily.    " "brinzolamide (AZOPT) 1 % ophthalmic suspension Place1 drop in left eye 3 times a day for 30 days    busPIRone (BUSPAR) 10 MG tablet Take 1 tablet (10 mg total) by mouth 3 (three) times daily as needed (anxiety).    cetirizine (ZYRTEC) 10 MG tablet Take 1 tablet every day by oral route.    dorzolamide-timolol 2-0.5% (COSOPT) 22.3-6.8 mg/mL ophthalmic solution place 1 drop in left eye twice a day  for 30 days    fluticasone propionate (FLONASE ALLERGY RELIEF) 50 mcg/actuation nasal spray [place] 1 spray every day by intranasal route.    gabapentin (NEURONTIN) 300 MG capsule Take 2 capsules twice a day by oral route for 90 days.    hydrALAZINE (APRESOLINE) 50 MG tablet Take 1 tablet (50 mg total) by mouth every 8 (eight) hours.    HYDROcodone-acetaminophen (NORCO) 5-325 mg per tablet Take 1 tablet by mouth every 4 (four) hours as needed for Pain (flank pain).    insulin aspart U-100 (NOVOLOG) 100 unit/mL (3 mL) InPn pen Inject 8 Units into the skin 3 (three) times daily with meals.    lancets (TRUEPLUS LANCETS) 33 gauge Misc Use 1 to check blood glucose three times daily    levETIRAcetam (KEPPRA) 500 MG Tab Take 1 tablet (500 mg total) by mouth 2 (two) times a day.    pantoprazole (PROTONIX) 40 MG tablet Take 1 tablet (40 mg total) by mouth once daily.    pen needle, diabetic 31 gauge x 3/16" Ndle Use as directed with insulin once daily    prednisoLONE acetate (PRED FORTE) 1 % DrpS Place 1 drop into the left eye 2 (two) times daily.    sevelamer carbonate (RENVELA) 800 mg Tab Take 1 tablet (800 mg total) by mouth 3 (three) times daily with meals.    sodium bicarbonate 650 MG tablet Take 1 tablet (650 mg total) by mouth 3 (three) times daily.    sucralfate (CARAFATE) 1 gram tablet Take 1 tablet (1 g total) by mouth 4 (four) times daily.    timolol maleate 0.5% (TIMOPTIC) 0.5 % Drop Place 1 drop in left eye 2 times a day for 30 days    [DISCONTINUED] albuterol (VENTOLIN HFA) 90 mcg/actuation inhaler Inhale 2 puffs every 4 " hours by inhalation route.    [DISCONTINUED] atenoloL (TENORMIN) 50 MG tablet Take 1 tablet (50 mg total) by mouth every other day.    [DISCONTINUED] FLUoxetine 40 MG capsule Take 1 capsule every day by oral route.    [DISCONTINUED] insulin detemir U-100, Levemir, (LEVEMIR FLEXTOUCH U100 INSULIN) 100 unit/mL (3 mL) InPn pen Inject 22 units every day by subcutaneous route in the evening for 90 days.    [DISCONTINUED] omeprazole (PRILOSEC) 20 MG capsule Take 2 capsules (40 mg total) by mouth once daily. for 10 days     Family History       Problem Relation (Age of Onset)    Diabetes Mother, Father          Tobacco Use    Smoking status: Never    Smokeless tobacco: Never   Substance and Sexual Activity    Alcohol use: Not Currently    Drug use: No    Sexual activity: Not Currently     Partners: Male     Birth control/protection: I.U.D.     Review of Systems   Constitutional:  Negative for chills and fever.   HENT:  Negative for sore throat.    Eyes:  Negative for visual disturbance.   Respiratory:  Negative for cough, chest tightness and shortness of breath.    Cardiovascular:  Negative for chest pain, palpitations and leg swelling.   Gastrointestinal:  Negative for abdominal pain, constipation and vomiting.   Genitourinary:  Negative for flank pain.   Musculoskeletal:  Positive for back pain. Negative for neck pain.   Neurological:  Negative for dizziness, seizures, syncope and headaches.   Psychiatric/Behavioral:  The patient is nervous/anxious.      Objective:     Vital Signs (Most Recent):  Temp: 98.7 °F (37.1 °C) (11/17/24 1151)  Pulse: 99 (11/17/24 1151)  Resp: 20 (11/17/24 1151)  BP: (!) 154/82 (11/17/24 1151)  SpO2: (!) 94 % (11/17/24 1151) Vital Signs (24h Range):  Temp:  [96.8 °F (36 °C)-98.7 °F (37.1 °C)] 98.7 °F (37.1 °C)  Pulse:  [] 99  Resp:  [16-32] 20  SpO2:  [88 %-100 %] 94 %  BP: (108-186)/() 154/82     Weight: 54 kg (119 lb)  Body mass index is 21.08 kg/m².     Physical  Exam  Constitutional:       Appearance: She is not toxic-appearing or diaphoretic.      Comments: Patient is complaining of back pain, rocking back and forth, restless   HENT:      Head: Atraumatic.      Comments: Teeth missing top right     Nose: No rhinorrhea.      Mouth/Throat:      Mouth: Mucous membranes are moist.   Cardiovascular:      Rate and Rhythm: Normal rate and regular rhythm.      Pulses: Normal pulses.      Heart sounds: Normal heart sounds. No murmur heard.  Pulmonary:      Effort: Pulmonary effort is normal. No respiratory distress.      Breath sounds: Normal breath sounds. No wheezing or rales.   Abdominal:      General: There is no distension.      Palpations: Abdomen is soft.   Genitourinary:     Comments: Reports anuric  Musculoskeletal:      Right lower leg: No edema.      Left lower leg: No edema.   Skin:     General: Skin is warm and dry.      Comments: Left arm dialysis graft   Neurological:      General: No focal deficit present.      Mental Status: She is alert and oriented to person, place, and time.                Significant Labs: All pertinent labs within the past 24 hours have been reviewed.  BMP:   Recent Labs   Lab 11/17/24  0417 11/17/24  1038   * 99    140   K 6.2* 4.9    99   CO2 17* 19*   * 41*   CREATININE 12.4* 6.2*   CALCIUM 9.7 9.7   MG 2.6  --      CBC:   Recent Labs   Lab 11/17/24  0208 11/17/24 0417 11/17/24  1038   WBC 12.38 11.43  --    HGB 8.2* 7.4* 7.3*   HCT 25.4* 24.1* 22.5*   * 113*  --      CMP:   Recent Labs   Lab 11/17/24  0208 11/17/24 0417 11/17/24  1038    141 140   K 7.1* 6.2* 4.9    107 99   CO2 17* 17* 19*   * 130* 99   BUN 99* 100* 41*   CREATININE 12.1* 12.4* 6.2*   CALCIUM 9.5 9.7 9.7   ANIONGAP 19* 17* 22*       Significant Imaging: I have reviewed all pertinent imaging results/findings within the past 24 hours.

## 2024-11-17 NOTE — ASSESSMENT & PLAN NOTE
Hyperkalemia is likely due to ESRD.The patients most recent potassium results are listed below.  Recent Labs     11/17/24  0208   K 7.1*     Plan  - Monitor for arrhythmias with EKG and/or continuous telemetry.   - Treat the hyperkalemia with Potassium Binders, Calcium gluconate, IV insulin and dextrose, and Nebulized albuterol sulfate.   - Monitor potassium: Daily

## 2024-11-17 NOTE — CARE UPDATE
11/17/24 1108   Patient Assessment/Suction   Level of Consciousness (AVPU) alert   Respiratory Effort Unlabored   Expansion/Accessory Muscles/Retractions no use of accessory muscles   Rhythm/Pattern, Respiratory depth regular;pattern regular;unlabored   Cough Frequency no cough   PRE-TX-O2   Device (Oxygen Therapy) nasal cannula   $ Is the patient on Low Flow Oxygen? Yes   Oxygen Concentration (%) 2   SpO2 (!) 92 %   Pulse Oximetry Type Intermittent   $ Pulse Oximetry - Multiple Charge Pulse Oximetry - Multiple   Pulse 100   Resp 20   Education   $ Education Oxygen;15 min   Tobacco Cessation Intervention   Do you use any type of tobacco product? No   Respiratory Evaluation   $ Care Plan Tech Time 15 min   $ Respiratory Evaluation Complete   Evaluation For New Orders   Admitting Diagnosis observation   Cardiac Diagnosis heart failure   Pulmonary Diagnosis na   Current Surgeries na   Home Oxygen   Has Home Oxygen? No   Home Aerosol, MDI, DPI, and Other Treatments/Therapies   Home Respiratory Therapy Per Patient/Review of Chart No   Oxygen Care Plan   Oxygen Care Plan Per Protocol   SPO2 Goal (%) 95% cardiac   Rationale Shortness of Breath   Bronchodilator Care Plan   Rationale No Rationale found   Atelectasis Care Plan   Rationale No Rational Found   Airway Clearance Care Plan   Rationale No rationale found

## 2024-11-17 NOTE — HPI
34 year old female with PMH of ESRD on HD(TTS), HFpEF, h/o SVT, gastritis, gastroparesis, sickle cell trait, insulin dependent type 2 diabetes mellitis presents due to bilateral flank pain which which patient has presented with several times in the past.  All previous work up has been negative and patient has history of asking for opioid medications.  Reports missing last two dialysis sessions (Last HD on Tuesday).  States she was unable to get a ride since she did not have gas money.  Denies abdominal pain, chest pain, dyspnea, swelling, radiation of pain, fevers, numbness/weakness. Patient is anuric.  Bloodwork revealed potassium of 7.1.    ED physician made clear to patient we will try to control pain without opioids and that she will be admitted for emergent dialysis.  Nephrology was consulted - recommended immediate dialysis for severe hyperkalemia.  Patient was given insulin/dextorse, calcium gluconate, albuterol in ED.  Given acetaminophen and lidocaine patch for back pain.

## 2024-11-17 NOTE — ASSESSMENT & PLAN NOTE
Creatine stable for now. BMP reviewed- noted Estimated Creatinine Clearance: 5.4 mL/min (A) (based on SCr of 12.1 mg/dL (H)). according to latest data. Based on current GFR, CKD stage is end stage.  Patient missed last two sessions of dialysis (TTS)    Monitor UOP and serial BMP and adjust therapy as needed.   Renally dose meds.   Avoid nephrotoxic medications and procedures.  Nephrology consulted - recommended immediate dialysis for severe hyperkalemia

## 2024-11-17 NOTE — SUBJECTIVE & OBJECTIVE
Past Medical History:   Diagnosis Date    ESRD on hemodialysis 2022    Gastritis 2022    EGD was 21    Gastroparesis 2022    has not had Emptying study    Heart failure with preserved ejection fraction 2022    EF 55% on 3/22    History of supraventricular tachycardia     Hyperkalemia 2022    Hypertensive emergency 2022    Sickle cell trait 2022    Type 2 diabetes mellitus        Past Surgical History:   Procedure Laterality Date     SECTION      x 3    COLONOSCOPY      COLONOSCOPY N/A 2022    Procedure: COLONOSCOPY;  Surgeon: Jagdeep Cedeno MD;  Location: [facility];  Service: Endoscopy;  Laterality: N/A;    DESTRUCTION, CILIARY BODY, USING LASER Left 2024    Procedure: Cyclophotocoagulation G-probe, retrobulbar chlorpromazine left eye;  Surgeon: Fidel Wise MD;  Location: [address];  Service: Ophthalmology;  Laterality: Left;    ENDOSCOPIC ULTRASOUND OF UPPER GASTROINTESTINAL TRACT N/A 2023    Procedure: ULTRASOUND, UPPER GI TRACT, ENDOSCOPIC;  Surgeon: Micky Paredes III, MD;  Location: [facility];  Service: Endoscopy;  Laterality: N/A;    ESOPHAGOGASTRODUODENOSCOPY N/A 10/18/2019    Procedure: ESOPHAGOGASTRODUODENOSCOPY (EGD);  Surgeon: Gianluca Mendez MD;  Location: [facility];  Service: Endoscopy;  Laterality: N/A;    ESOPHAGOGASTRODUODENOSCOPY N/A 2022    Procedure: EGD (ESOPHAGOGASTRODUODENOSCOPY);  Surgeon: Micky Paredes III, MD;  Location: [facility];  Service: Endoscopy;  Laterality: N/A;    ESOPHAGOGASTRODUODENOSCOPY N/A 2022    Procedure: EGD (ESOPHAGOGASTRODUODENOSCOPY);  Surgeon: Marcelo Zhong MD;  Location: [facility];  Service: Endoscopy;  Laterality: N/A;    ESOPHAGOGASTRODUODENOSCOPY N/A 2024    Procedure: EGD (ESOPHAGOGASTRODUODENOSCOPY);  Surgeon: Marcelo Zhong MD;  Location: [facility];  Service: Endoscopy;  Laterality: N/A;    EYE SURGERY      INSERTION, CATHETER, TUNNELED N/A  "06/17/2023    Procedure: Insertion,catheter,tunneled;  Surgeon: Carlos Thurman Jr., MD;  Location: [facility] OR;  Service: General;  Laterality: N/A;    LAPAROSCOPIC CHOLECYSTECTOMY N/A 07/30/2022    Procedure: CHOLECYSTECTOMY, LAPAROSCOPIC;  Surgeon: Grey Perez MD;  Location: [facility] OR;  Service: General;  Laterality: N/A;    PLACEMENT OF DUAL-LUMEN VASCULAR CATHETER Left 07/12/2022    Procedure: INSERTION-CATHETER-JOSEPH;  Surgeon: Dionte Gan MD;  Location: [facility];  Service: General;  Laterality: Left;    PLACEMENT OF DUAL-LUMEN VASCULAR CATHETER Right 07/26/2022    Procedure: INSERTION-CATHETER-Hemosplit;  Surgeon: Dionte Gan MD;  Location: [facility] OR;  Service: General;  Laterality: Right;       Review of patient's allergies indicates:   Allergen Reactions    Droperidol Hives    Haldol [haloperidol lactate] Hives    Penicillins Hives    Droperidol (bulk) Anxiety     STATES "FREAKS OUT".  TOLERATES HALDOL PRIOR TO ARRIVAL AT ANOTHER FACILITY TODAY.        No current facility-administered medications on file prior to encounter.     Current Outpatient Medications on File Prior to Encounter   Medication Sig    apixaban (ELIQUIS) 5 mg Tab Take 1 tablet (5 mg total) by mouth 2 (two) times daily. Patient w/ anemia, so held during admission, follow-up with hematologist about restarting    atropine 1% (ISOPTO ATROPINE) 1 % Drop Place 1 drop into the left eye 2 (two) times a day.    blood sugar diagnostic (TRUE METRIX GLUCOSE TEST STRIP) Strp Use 1 strip to check blood glucose three times daily    blood-glucose meter (TRUE METRIX GLUCOSE METER) Misc Use to check blood glucose    blood-glucose meter,continuous Misc USE WITH SENSORS    blood-glucose sensor (DEXCOM G7 SENSOR) Heather Use as directed for CGM. Change sensor every 10 days    blood-glucose sensor Heather CHANGE EVERY 10 DAYS    brimonidine 0.15 % OPTH DROP (ALPHAGAN) 0.15 % ophthalmic solution Place 1 drop into both eyes 3 (three) times daily.    " "brinzolamide (AZOPT) 1 % ophthalmic suspension Place1 drop in left eye 3 times a day for 30 days    busPIRone (BUSPAR) 10 MG tablet Take 1 tablet (10 mg total) by mouth 3 (three) times daily as needed (anxiety).    cetirizine (ZYRTEC) 10 MG tablet Take 1 tablet every day by oral route.    dorzolamide-timolol 2-0.5% (COSOPT) 22.3-6.8 mg/mL ophthalmic solution place 1 drop in left eye twice a day  for 30 days    fluticasone propionate (FLONASE ALLERGY RELIEF) 50 mcg/actuation nasal spray Star 1 spray every day by intranasal route.    gabapentin (NEURONTIN) 300 MG capsule Take 2 capsules twice a day by oral route for 90 days.    hydrALAZINE (APRESOLINE) 50 MG tablet Take 1 tablet (50 mg total) by mouth every 8 (eight) hours.    HYDROcodone-acetaminophen (NORCO) 5-325 mg per tablet Take 1 tablet by mouth every 4 (four) hours as needed for Pain (flank pain).    insulin aspart U-100 (NOVOLOG) 100 unit/mL (3 mL) InPn pen Inject 8 Units into the skin 3 (three) times daily with meals.    lancets (TRUEPLUS LANCETS) 33 gauge Misc Use 1 to check blood glucose three times daily    levETIRAcetam (KEPPRA) 500 MG Tab Take 1 tablet (500 mg total) by mouth 2 (two) times a day.    pantoprazole (PROTONIX) 40 MG tablet Take 1 tablet (40 mg total) by mouth once daily.    pen needle, diabetic 31 gauge x 3/16" Ndle Use as directed with insulin once daily    prednisoLONE acetate (PRED FORTE) 1 % DrpS Place 1 drop into the left eye 2 (two) times daily.    sevelamer carbonate (RENVELA) 800 mg Tab Take 1 tablet (800 mg total) by mouth 3 (three) times daily with meals.    sodium bicarbonate 650 MG tablet Take 1 tablet (650 mg total) by mouth 3 (three) times daily.    sucralfate (CARAFATE) 1 gram tablet Take 1 tablet (1 g total) by mouth 4 (four) times daily.    timolol maleate 0.5% (TIMOPTIC) 0.5 % Drop Place 1 drop in left eye 2 times a day for 30 days    [DISCONTINUED] albuterol (VENTOLIN HFA) 90 mcg/actuation inhaler Inhale 2 puffs every 4 " hours by inhalation route.    [DISCONTINUED] atenoloL (TENORMIN) 50 MG tablet Take 1 tablet (50 mg total) by mouth every other day.    [DISCONTINUED] FLUoxetine 40 MG capsule Take 1 capsule every day by oral route.    [DISCONTINUED] insulin detemir U-100, Levemir, (LEVEMIR FLEXTOUCH U100 INSULIN) 100 unit/mL (3 mL) InPn pen Inject 22 units every day by subcutaneous route in the evening for 90 days.    [DISCONTINUED] omeprazole (PRILOSEC) 20 MG capsule Take 2 capsules (40 mg total) by mouth once daily. for 10 days     Family History       Problem Relation (Age of Onset)    Diabetes Mother, Father          Tobacco Use    Smoking status: Never    Smokeless tobacco: Never   Substance and Sexual Activity    Alcohol use: Not Currently    Drug use: No    Sexual activity: Not Currently     Partners: Male     Birth control/protection: I.U.D.     Review of Systems   Constitutional:  Negative for chills and fever.   HENT:  Negative for sore throat.    Eyes:  Negative for visual disturbance.   Respiratory:  Negative for cough, chest tightness and shortness of breath.    Cardiovascular:  Negative for chest pain, palpitations and leg swelling.   Gastrointestinal:  Negative for abdominal pain, constipation and vomiting.   Genitourinary:  Positive for flank pain.   Musculoskeletal:  Positive for back pain. Negative for neck pain.   Neurological:  Negative for dizziness, seizures, syncope and headaches.     Objective:     Vital Signs (Most Recent):  Temp: 96.8 °F (36 °C) (11/17/24 0129)  Pulse: 93 (11/17/24 0314)  Resp: (!) 23 (11/17/24 0314)  BP: (!) 162/72 (11/17/24 0300)  SpO2: 96 % (11/17/24 0314) Vital Signs (24h Range):  Temp:  [96.8 °F (36 °C)] 96.8 °F (36 °C)  Pulse:  [] 93  Resp:  [23-32] 23  SpO2:  [93 %-100 %] 96 %  BP: (145-186)/() 162/72     Weight: 54 kg (119 lb)  Body mass index is 21.08 kg/m².     Physical Exam  Constitutional:       Appearance: She is not toxic-appearing or diaphoretic.      Comments:  Patient is complaining of back pain, rocking back and forth, restless   HENT:      Head: Atraumatic.      Nose: No rhinorrhea.      Mouth/Throat:      Mouth: Mucous membranes are moist.   Cardiovascular:      Rate and Rhythm: Normal rate and regular rhythm.      Pulses: Normal pulses.      Heart sounds: Normal heart sounds. No murmur heard.  Pulmonary:      Effort: Pulmonary effort is normal. No respiratory distress.      Breath sounds: Normal breath sounds. No wheezing or rales.   Abdominal:      General: There is no distension.      Palpations: Abdomen is soft.   Musculoskeletal:      Right lower leg: No edema.      Left lower leg: No edema.   Skin:     General: Skin is warm and dry.   Neurological:      General: No focal deficit present.      Mental Status: She is alert.                Significant Labs: All pertinent labs within the past 24 hours have been reviewed.  BMP:   Recent Labs   Lab 11/17/24 0208   *      K 7.1*      CO2 17*   BUN 99*   CREATININE 12.1*   CALCIUM 9.5     CBC:   Recent Labs   Lab 11/17/24 0208   WBC 12.38   HGB 8.2*   HCT 25.4*   *     CMP:   Recent Labs   Lab 11/17/24 0208      K 7.1*      CO2 17*   *   BUN 99*   CREATININE 12.1*   CALCIUM 9.5   ANIONGAP 19*       Significant Imaging: I have reviewed all pertinent imaging results/findings within the past 24 hours.

## 2024-11-17 NOTE — PLAN OF CARE
Problem: Hemodialysis  Goal: Safe, Effective Therapy Delivery  Outcome: Progressing  Goal: Effective Tissue Perfusion  Outcome: Progressing  Goal: Absence of Infection Signs and Symptoms  Outcome: Progressing     Problem: Adult Inpatient Plan of Care  Goal: Absence of Hospital-Acquired Illness or Injury  Outcome: Progressing  Goal: Readiness for Transition of Care  Outcome: Progressing     Problem: Diabetes Comorbidity  Goal: Blood Glucose Level Within Targeted Range  Outcome: Progressing     Problem: Fall Injury Risk  Goal: Absence of Fall and Fall-Related Injury  Outcome: Progressing

## 2024-11-17 NOTE — ASSESSMENT & PLAN NOTE
Missed dialysis sessions contributory  Nephrology onboard  Dialyzed 11/17/24  Repeat dialysis per Nephrology

## 2024-11-17 NOTE — ASSESSMENT & PLAN NOTE
Anemia is likely due to chronic disease due to ESRD. Most recent hemoglobin and hematocrit are listed below.  Recent Labs     11/17/24  0208 11/17/24  0417 11/17/24  1038   HGB 8.2* 7.4* 7.3*   HCT 25.4* 24.1* 22.5*     Plan  - Monitor serial CBC: Daily  - Transfuse PRBC if patient becomes hemodynamically unstable, symptomatic or H/H drops below 7/21.  - Patient has not received any PRBC transfusions to date  - Blood transfusion planned during dialysis 11/18/24

## 2024-11-17 NOTE — PROGRESS NOTES
2700 ml net uf removed, tx ended 15 minutes early at her request   11/17/24 0745   Required for all Hemodialysis Patients   Hepatitis Status negative   Handoff Report   Received From Dahlia   Given To Haylee   Treatment Type   Treatment Type Maintenance   Vital Signs   Temp 98.5 °F (36.9 °C)   Temp Source Oral   Pulse 98   Resp 18   SpO2 (!) 88 %   Pulse Oximetry Type Intermittent   Oxygen Concentration (%) 2   Device (Oxygen Therapy) nasal cannula   /61   BP Location Right arm   BP Method Automatic   Patient Position Lying   Assessments (Pre/Post)   Consent Obtained yes   Safety vein preservation armband present   Date Hepatitis Profile Obtained 02/27/24   Blood Liters Processed (BLP) 59   Transport Modality stretcher   Level of Consciousness (AVPU) alert   Dialyzer Clearance mildly streaked   Pain   Preferred Pain Scale number (Numeric Rating Pain Scale)   Pain Rating (0-10): Rest 10        Hemodialysis AV Fistula Left upper arm   No placement date or time found.   Location: Left upper arm   Site Assessment Clean;Dry;Intact   Patency Present;Bruit;Thrill   Status Deaccessed   Flows Good   Dressing Status Clean;Dry;Intact   Dressing Gauze   Post-Hemodialysis Assessment   Rinseback Volume (mL) 250 mL   Blood Volume Processed (Liters) 59 L   Dialyzer Clearance Lightly streaked   Duration of Treatment 165 minutes   Additional Fluid Intake (mL) 500 mL   Total UF (mL) 3200 mL   Net Fluid Removal 2700   Patient Response to Treatment crying and moving around   Post-Treatment Weight 51.5 kg (113 lb 8.6 oz)   Treatment Weight Change -2.5   Post-Hemodialysis Comments tx completed     Educated on tx compliance

## 2024-11-17 NOTE — ED NOTES
Spoke with Shirley at [facility] on call dialysis. States Dr Benitez had already called them and the dialysis nurse had been dispatched.

## 2024-11-17 NOTE — PLAN OF CARE
Critical access hospital  Initial Discharge Assessment       Primary Care Provider: Melony Kim NP    Admission Diagnosis: Flank pain [R10.9]    Admission Date: 11/17/2024  Expected Discharge Date: TBD  Met with patient at bedside to complete assessment. Patient was fidgety and moaning due to pain. Patient was alert and oriented times four. No POA, living will or advance directive. No HH or Coumadin. Patient has a history of substance abuse but declined outpatient resources. Patient receives HD on TTS at [facility]. Patient lives with her three teenage daughters. Patient's father will bring her home when she is discharged.SW will continue to monitor and provide assistance as needed.    Transition of Care Barriers: Substance Abuse    Payor: MEDICAID / Plan: [place] [place] / Product Type: Managed Medicaid /     Extended Emergency Contact Information  Primary Emergency Contact: HansonRamya  Home Phone: [phone]  Mobile Phone: [phone]  Relation: Mother  Preferred language: English   needed? No  Secondary Emergency Contact: Garcia Howard  Address: [address] [place] [address]  Home Phone: [phone]  Mobile Phone: [phone]  Relation: Father  Preferred language: English   needed? No    Discharge Plan A: Home with family  Discharge Plan B: Home with family      [facility]  [address]  [place] [address]  Phone: [phone] Fax: [phone]      Initial Assessment (most recent)       Adult Discharge Assessment - 11/17/24 1011          Discharge Assessment    Assessment Type Discharge Planning Assessment     Confirmed/corrected address, phone number and insurance Yes     Confirmed Demographics Correct on Facesheet     Source of Information patient     When was your last doctors appointment? --   one month ago    Does patient/caregiver understand observation status Yes      Communicated TATIANA with patient/caregiver Date not available/Unable to determine     Reason For Admission ESRD     People in Home child(tammy), dependent     Facility Arrived From: home     Do you expect to return to your current living situation? Yes     Do you have help at home or someone to help you manage your care at home? Yes     Who are your caregiver(s) and their phone number(s)? Donna Freire/mother [phone], Garcia Howard/father [phone], three teenage daughters     Prior to hospitilization cognitive status: Alert/Oriented     Current cognitive status: Alert/Oriented     Walking or Climbing Stairs Difficulty no     Dressing/Bathing Difficulty no     Equipment Currently Used at Home glucometer     Readmission within 30 days? Yes     Patient currently being followed by outpatient case management? No     Do you currently have service(s) that help you manage your care at home? No     Do you take prescription medications? Yes     Do you have prescription coverage? Yes     Coverage [employer] Medicaid     Do you have any problems affording any of your prescribed medications? No     Is the patient taking medications as prescribed? yes     Who is going to help you get home at discharge? Father or mother     How do you get to doctors appointments? family or friend will provide     Are you on dialysis? Yes     Dialysis Name and Scheduled days [facility] TTS     Do you take coumadin? No     Discharge Plan A Home with family     Discharge Plan B Home with family     DME Needed Upon Discharge  none     Discharge Plan discussed with: Patient     Transition of Care Barriers Substance Abuse        Physical Activity    On average, how many days per week do you engage in moderate to strenuous exercise (like a brisk walk)? 7 days     On average, how many minutes do you engage in exercise at this level? 20 min        Financial Resource Strain    How hard is it for you to pay for the very  basics like food, housing, medical care, and heating? Hard   resources offered but patient declined       Housing Stability    In the last 12 months, was there a time when you were not able to pay the mortgage or rent on time? No     At any time in the past 12 months, were you homeless or living in a shelter (including now)? No        Transportation Needs    Has the lack of transportation kept you from medical appointments, meetings, work or from getting things needed for daily living? Yes, it has kept me from medical appointments or from getting my medications.   missed last HD session because father had no money to buy gas for his car       Food Insecurity    Within the past 12 months, you worried that your food would run out before you got the money to buy more. Never true     Within the past 12 months, the food you bought just didn't last and you didn't have money to get more. Never true        Stress    Do you feel stress - tense, restless, nervous, or anxious, or unable to sleep at night because your mind is troubled all the time - these days? To some extent        Social Isolation    How often do you feel lonely or isolated from those around you?  Sometimes        Alcohol Use    Q1: How often do you have a drink containing alcohol? Never     Q2: How many drinks containing alcohol do you have on a typical day when you are drinking? Patient does not drink     Q3: How often do you have six or more drinks on one occasion? Never        Utilities    In the past 12 months has the electric, gas, oil, or water company threatened to shut off services in your home? No        Health Literacy    How often do you need to have someone help you when you read instructions, pamphlets, or other written material from your doctor or pharmacy? Sometimes

## 2024-11-17 NOTE — PROGRESS NOTES
[facility] Medicine  Progress Note    Patient Name: Tabby Howard  MRN: [id number]  Patient Class: OP- Observation   Admission Date: 11/17/2024  Length of Stay: 0 days  Attending Physician: Jimena Freeman MD  Primary Care Provider: Melony Kim NP        Subjective:     Principal Problem:Fluid volume excess        HPI:  34 year old female with PMH of ESRD on HD(TTS), HFpEF, h/o SVT, gastritis, gastroparesis, sickle cell trait, insulin dependent type 2 diabetes mellitis presents due to bilateral flank pain which which patient has presented with several times in the past.  All previous work up has been negative and patient has history of asking for opioid medications.  Reports missing last two dialysis sessions (Last HD on Tuesday).  States she was unable to get a ride since she did not have gas money.  Denies abdominal pain, chest pain, dyspnea, swelling, radiation of pain, fevers, numbness/weakness. Patient is anuric.  Bloodwork revealed potassium of 7.1.    ED physician made clear to patient we will try to control pain without opioids and that she will be admitted for emergent dialysis.  Nephrology was consulted - recommended immediate dialysis for severe hyperkalemia.  Patient was given insulin/dextorse, calcium gluconate, albuterol in ED.  Given acetaminophen and lidocaine patch for back pain.      Overview/Hospital Course:  34 y.o. female patient with Hx ESRD,hypertension, anemia of chronic (renal) disease, sickle cell trait, DMII, and chronic pain admitted after presenting for evaluation of BL flank pain. She was noted to have missed 2 dialysis sessions as well as hyperkalemia and anemia. Nephrology was consulted and patient received emergency dialysis due to hyperkalemia. Nephrology plans to repeat dialysis tomorrow and transfuse one unit of blood while on dialysis. Patient tells me that she is unsure of her medications that she takes at home. She points to her pain as posterior  hip. XR hips BL ordered.    Past Medical History:   Diagnosis Date    ESRD on hemodialysis 2022    Gastritis 2022    EGD was 21    Gastroparesis 2022    has not had Emptying study    Heart failure with preserved ejection fraction 2022    EF 55% on 3/22    History of supraventricular tachycardia     Hyperkalemia 2022    Hypertensive emergency 2022    Sickle cell trait 2022    Type 2 diabetes mellitus        Past Surgical History:   Procedure Laterality Date     SECTION      x 3    COLONOSCOPY      COLONOSCOPY N/A 2022    Procedure: COLONOSCOPY;  Surgeon: Jagdeep Cedeno MD;  Location: [facility];  Service: Endoscopy;  Laterality: N/A;    DESTRUCTION, CILIARY BODY, USING LASER Left 2024    Procedure: Cyclophotocoagulation G-probe, retrobulbar chlorpromazine left eye;  Surgeon: Fidel Wise MD;  Location: [address];  Service: Ophthalmology;  Laterality: Left;    ENDOSCOPIC ULTRASOUND OF UPPER GASTROINTESTINAL TRACT N/A 2023    Procedure: ULTRASOUND, UPPER GI TRACT, ENDOSCOPIC;  Surgeon: Micky Paredes III, MD;  Location: [facility];  Service: Endoscopy;  Laterality: N/A;    ESOPHAGOGASTRODUODENOSCOPY N/A 10/18/2019    Procedure: ESOPHAGOGASTRODUODENOSCOPY (EGD);  Surgeon: Gianluca Mendez MD;  Location: [facility];  Service: Endoscopy;  Laterality: N/A;    ESOPHAGOGASTRODUODENOSCOPY N/A 2022    Procedure: EGD (ESOPHAGOGASTRODUODENOSCOPY);  Surgeon: Micky Paredes III, MD;  Location: [facility];  Service: Endoscopy;  Laterality: N/A;    ESOPHAGOGASTRODUODENOSCOPY N/A 2022    Procedure: EGD (ESOPHAGOGASTRODUODENOSCOPY);  Surgeon: Marcelo Zhong MD;  Location: [facility];  Service: Endoscopy;  Laterality: N/A;    ESOPHAGOGASTRODUODENOSCOPY N/A 2024    Procedure: EGD (ESOPHAGOGASTRODUODENOSCOPY);  Surgeon: Marcelo Zhong MD;  Location: [facility];  Service: Endoscopy;  Laterality: N/A;    EYE SURGERY      INSERTION,  "CATHETER, TUNNELED N/A 06/17/2023    Procedure: Insertion,catheter,tunneled;  Surgeon: Carlos Thurman Jr., MD;  Location: [facility] OR;  Service: General;  Laterality: N/A;    LAPAROSCOPIC CHOLECYSTECTOMY N/A 07/30/2022    Procedure: CHOLECYSTECTOMY, LAPAROSCOPIC;  Surgeon: Grey Perez MD;  Location: [facility] OR;  Service: General;  Laterality: N/A;    PLACEMENT OF DUAL-LUMEN VASCULAR CATHETER Left 07/12/2022    Procedure: INSERTION-CATHETER-JOSEPH;  Surgeon: Dionte Gan MD;  Location: [facility] OR;  Service: General;  Laterality: Left;    PLACEMENT OF DUAL-LUMEN VASCULAR CATHETER Right 07/26/2022    Procedure: INSERTION-CATHETER-Hemosplit;  Surgeon: Dionte Gan MD;  Location: [facility] OR;  Service: General;  Laterality: Right;       Review of patient's allergies indicates:   Allergen Reactions    Droperidol Hives    Haldol [haloperidol lactate] Hives    Penicillins Hives    Droperidol (bulk) Anxiety     STATES "FREAKS OUT".  TOLERATES HALDOL PRIOR TO ARRIVAL AT ANOTHER FACILITY TODAY.        No current facility-administered medications on file prior to encounter.     Current Outpatient Medications on File Prior to Encounter   Medication Sig    apixaban (ELIQUIS) 5 mg Tab Take 1 tablet (5 mg total) by mouth 2 (two) times daily. Patient w/ anemia, so held during admission, follow-up with hematologist about restarting    atropine 1% (ISOPTO ATROPINE) 1 % Drop Place 1 drop into the left eye 2 (two) times a day.    blood sugar diagnostic (TRUE METRIX GLUCOSE TEST STRIP) Strp Use 1 strip to check blood glucose three times daily    blood-glucose meter (TRUE METRIX GLUCOSE METER) Misc Use to check blood glucose    blood-glucose meter,continuous Misc USE WITH SENSORS    blood-glucose sensor (DEXCOM G7 SENSOR) Heather Use as directed for CGM. Change sensor every 10 days    blood-glucose sensor Heather CHANGE EVERY 10 DAYS    brimonidine 0.15 % OPTH DROP (ALPHAGAN) 0.15 % ophthalmic solution Place 1 drop into both eyes 3 (three) " "times daily.    brinzolamide (AZOPT) 1 % ophthalmic suspension Place1 drop in left eye 3 times a day for 30 days    busPIRone (BUSPAR) 10 MG tablet Take 1 tablet (10 mg total) by mouth 3 (three) times daily as needed (anxiety).    cetirizine (ZYRTEC) 10 MG tablet Take 1 tablet every day by oral route.    dorzolamide-timolol 2-0.5% (COSOPT) 22.3-6.8 mg/mL ophthalmic solution place 1 drop in left eye twice a day  for 30 days    fluticasone propionate (FLONASE ALLERGY RELIEF) 50 mcg/actuation nasal spray [place] 1 spray every day by intranasal route.    gabapentin (NEURONTIN) 300 MG capsule Take 2 capsules twice a day by oral route for 90 days.    hydrALAZINE (APRESOLINE) 50 MG tablet Take 1 tablet (50 mg total) by mouth every 8 (eight) hours.    HYDROcodone-acetaminophen (NORCO) 5-325 mg per tablet Take 1 tablet by mouth every 4 (four) hours as needed for Pain (flank pain).    insulin aspart U-100 (NOVOLOG) 100 unit/mL (3 mL) InPn pen Inject 8 Units into the skin 3 (three) times daily with meals.    lancets (TRUEPLUS LANCETS) 33 gauge Misc Use 1 to check blood glucose three times daily    levETIRAcetam (KEPPRA) 500 MG Tab Take 1 tablet (500 mg total) by mouth 2 (two) times a day.    pantoprazole (PROTONIX) 40 MG tablet Take 1 tablet (40 mg total) by mouth once daily.    pen needle, diabetic 31 gauge x 3/16" Ndle Use as directed with insulin once daily    prednisoLONE acetate (PRED FORTE) 1 % DrpS Place 1 drop into the left eye 2 (two) times daily.    sevelamer carbonate (RENVELA) 800 mg Tab Take 1 tablet (800 mg total) by mouth 3 (three) times daily with meals.    sodium bicarbonate 650 MG tablet Take 1 tablet (650 mg total) by mouth 3 (three) times daily.    sucralfate (CARAFATE) 1 gram tablet Take 1 tablet (1 g total) by mouth 4 (four) times daily.    timolol maleate 0.5% (TIMOPTIC) 0.5 % Drop Place 1 drop in left eye 2 times a day for 30 days    [DISCONTINUED] albuterol (VENTOLIN HFA) 90 mcg/actuation inhaler Inhale " 2 puffs every 4 hours by inhalation route.    [DISCONTINUED] atenoloL (TENORMIN) 50 MG tablet Take 1 tablet (50 mg total) by mouth every other day.    [DISCONTINUED] FLUoxetine 40 MG capsule Take 1 capsule every day by oral route.    [DISCONTINUED] insulin detemir U-100, Levemir, (LEVEMIR FLEXTOUCH U100 INSULIN) 100 unit/mL (3 mL) InPn pen Inject 22 units every day by subcutaneous route in the evening for 90 days.    [DISCONTINUED] omeprazole (PRILOSEC) 20 MG capsule Take 2 capsules (40 mg total) by mouth once daily. for 10 days     Family History       Problem Relation (Age of Onset)    Diabetes Mother, Father          Tobacco Use    Smoking status: Never    Smokeless tobacco: Never   Substance and Sexual Activity    Alcohol use: Not Currently    Drug use: No    Sexual activity: Not Currently     Partners: Male     Birth control/protection: I.U.D.     Review of Systems   Constitutional:  Negative for chills and fever.   HENT:  Negative for sore throat.    Eyes:  Negative for visual disturbance.   Respiratory:  Negative for cough, chest tightness and shortness of breath.    Cardiovascular:  Negative for chest pain, palpitations and leg swelling.   Gastrointestinal:  Negative for abdominal pain, constipation and vomiting.   Genitourinary:  Negative for flank pain.   Musculoskeletal:  Positive for back pain. Negative for neck pain.   Neurological:  Negative for dizziness, seizures, syncope and headaches.   Psychiatric/Behavioral:  The patient is nervous/anxious.      Objective:     Vital Signs (Most Recent):  Temp: 98.7 °F (37.1 °C) (11/17/24 1151)  Pulse: 99 (11/17/24 1151)  Resp: 20 (11/17/24 1151)  BP: (!) 154/82 (11/17/24 1151)  SpO2: (!) 94 % (11/17/24 1151) Vital Signs (24h Range):  Temp:  [96.8 °F (36 °C)-98.7 °F (37.1 °C)] 98.7 °F (37.1 °C)  Pulse:  [] 99  Resp:  [16-32] 20  SpO2:  [88 %-100 %] 94 %  BP: (108-186)/() 154/82     Weight: 54 kg (119 lb)  Body mass index is 21.08 kg/m².     Physical  Exam  Constitutional:       Appearance: She is not toxic-appearing or diaphoretic.      Comments: Patient is complaining of back pain, rocking back and forth, restless   HENT:      Head: Atraumatic.      Comments: Teeth missing top right     Nose: No rhinorrhea.      Mouth/Throat:      Mouth: Mucous membranes are moist.   Cardiovascular:      Rate and Rhythm: Normal rate and regular rhythm.      Pulses: Normal pulses.      Heart sounds: Normal heart sounds. No murmur heard.  Pulmonary:      Effort: Pulmonary effort is normal. No respiratory distress.      Breath sounds: Normal breath sounds. No wheezing or rales.   Abdominal:      General: There is no distension.      Palpations: Abdomen is soft.   Genitourinary:     Comments: Reports anuric  Musculoskeletal:      Right lower leg: No edema.      Left lower leg: No edema.   Skin:     General: Skin is warm and dry.      Comments: Left arm dialysis graft   Neurological:      General: No focal deficit present.      Mental Status: She is alert and oriented to person, place, and time.                Significant Labs: All pertinent labs within the past 24 hours have been reviewed.  BMP:   Recent Labs   Lab 11/17/24  0417 11/17/24  1038   * 99    140   K 6.2* 4.9    99   CO2 17* 19*   * 41*   CREATININE 12.4* 6.2*   CALCIUM 9.7 9.7   MG 2.6  --      CBC:   Recent Labs   Lab 11/17/24  0208 11/17/24 0417 11/17/24  1038   WBC 12.38 11.43  --    HGB 8.2* 7.4* 7.3*   HCT 25.4* 24.1* 22.5*   * 113*  --      CMP:   Recent Labs   Lab 11/17/24  0208 11/17/24  0417 11/17/24  1038    141 140   K 7.1* 6.2* 4.9    107 99   CO2 17* 17* 19*   * 130* 99   BUN 99* 100* 41*   CREATININE 12.1* 12.4* 6.2*   CALCIUM 9.5 9.7 9.7   ANIONGAP 19* 17* 22*       Significant Imaging: I have reviewed all pertinent imaging results/findings within the past 24 hours.    Assessment/Plan:      * Fluid volume excess  Missed dialysis sessions  contributory  Nephrology onboard  Dialyzed 11/17/24  Repeat dialysis per Nephrology      Anemia due to chronic kidney disease, on chronic dialysis  Anemia is likely due to chronic disease due to ESRD. Most recent hemoglobin and hematocrit are listed below.  Recent Labs     11/17/24 0208 11/17/24 0417 11/17/24  1038   HGB 8.2* 7.4* 7.3*   HCT 25.4* 24.1* 22.5*     Plan  - Monitor serial CBC: Daily  - Transfuse PRBC if patient becomes hemodynamically unstable, symptomatic or H/H drops below 7/21.  - Patient has not received any PRBC transfusions to date  - Blood transfusion planned during dialysis 11/18/24    ESRD (end stage renal disease)  Creatine stable for now. BMP reviewed- noted Estimated Creatinine Clearance: 5.4 mL/min (A) (based on SCr of 12.1 mg/dL (H)). according to latest data. Based on current GFR, CKD stage is end stage.  Patient missed last two sessions of dialysis (TTS)    Monitor UOP and serial BMP and adjust therapy as needed.   Renally dose meds.   Avoid nephrotoxic medications and procedures.  Nephrology consulted - recommended immediate dialysis for severe hyperkalemia     Hyperkalemia  Hyperkalemia is likely due to ESRD.The patients most recent potassium results are listed below.  Recent Labs     11/17/24 0208 11/17/24 0417 11/17/24  1038   K 7.1* 6.2* 4.9       Plan  - Monitor for arrhythmias with EKG and/or continuous telemetry.   - Treat the hyperkalemia with Potassium Binders, Calcium gluconate, IV insulin and dextrose, and Nebulized albuterol sulfate.   - Monitor potassium: Daily          Bilateral hip pain  Points to BL hip vs CVA  Non-tender to palpation  Suspect anemia and FVO contributory  Hip XR - denies recent trauma  Pain management      Type 2 diabetes mellitus with hyperglycemia, with long-term current use of insulin, previous HbA1c 5.8%  Continue insulin aspart 8 units with each meal  POCT/ISS      Drug-seeking behavior  Patient has history of presenting to ED requesting  opioids for bilateral flank pain.  Patient agrees to non-opioid medication regimen while awaiting emergent dialysis    PRN pain control - acetaminophen, lidocaine patch       Deep vein thrombosis (DVT) of non-extremity vein  Continnue anticoagulation with eliquis        VTE Risk Mitigation (From admission, onward)           Ordered     apixaban tablet 2.5 mg  2 times daily         11/17/24 0330     Reason for No Pharmacological VTE Prophylaxis  Once        Question:  Reasons:  Answer:  Already adequately anticoagulated on oral Anticoagulants    11/17/24 0328     IP VTE HIGH RISK PATIENT  Once         11/17/24 0328     Place sequential compression device  Until discontinued         11/17/24 0328                    Discharge Planning   TATIANA:      Code Status: Full Code   Is the patient medically ready for discharge?:     Reason for patient still in hospital (select all that apply): Patient unstable, Patient trending condition, Treatment, and Consult recommendations  Discharge Plan A: Home with family                Zee Padron, DNP, APRN, FNP-C  [facility] Medicine  [facility]  [email or web address]

## 2024-11-17 NOTE — ED PROVIDER NOTES
"Chief complaint:  Flank Pain (Dialysis pt and did not go to dialysis)      HPI:  Tabby Howard is a 34 y.o. female with hx IDDM, ESRD on HD Tu/Th/Sat, gastroparesis, diastolic CHF, chronic back/flank pain presenting with recurrent onset of bilateral flank pain she has had numerous times in the past and review of the medical record.  This is identical to prior.  She endorses this occurring every several weeks to months.  She notably has not attended her last do dialysis sessions.  She denies abdominal pain, chest pain, dyspnea, swelling.  There is no radiation or migration of pain.  She is anuric with dialysis and denies urinary symptoms such as dysuria.  There is no history of fever.  There is no new numbness or weakness.    ROS: As per HPI and below:  No vomiting, blood in the stools, fever.    Review of patient's allergies indicates:   Allergen Reactions    Droperidol Hives    Haldol [haloperidol lactate] Hives    Penicillins Hives    Droperidol (bulk) Anxiety     STATES "FREAKS OUT".  TOLERATES HALDOL PRIOR TO ARRIVAL AT ANOTHER FACILITY TODAY.        Patient's Medications   New Prescriptions    No medications on file   Previous Medications    APIXABAN (ELIQUIS) 5 MG TAB    Take 1 tablet (5 mg total) by mouth 2 (two) times daily. Patient w/ anemia, so held during admission, follow-up with hematologist about restarting    ATROPINE 1% (ISOPTO ATROPINE) 1 % DROP    Place 1 drop into the left eye 2 (two) times a day.    BLOOD SUGAR DIAGNOSTIC (TRUE METRIX GLUCOSE TEST STRIP) STRP    Use 1 strip to check blood glucose three times daily    BLOOD-GLUCOSE METER (TRUE METRIX GLUCOSE METER) MISC    Use to check blood glucose    BLOOD-GLUCOSE METER,CONTINUOUS MISC    USE WITH SENSORS    BLOOD-GLUCOSE SENSOR (DEXCOM G7 SENSOR) ELIZABETH    Use as directed for CGM. Change sensor every 10 days    BLOOD-GLUCOSE SENSOR ELIZABETH    CHANGE EVERY 10 DAYS    BRIMONIDINE 0.15 % OPTH DROP (ALPHAGAN) 0.15 % OPHTHALMIC SOLUTION    Place 1 drop " "into both eyes 3 (three) times daily.    BRINZOLAMIDE (AZOPT) 1 % OPHTHALMIC SUSPENSION    Place1 drop in left eye 3 times a day for 30 days    BUSPIRONE (BUSPAR) 10 MG TABLET    Take 1 tablet (10 mg total) by mouth 3 (three) times daily as needed (anxiety).    CETIRIZINE (ZYRTEC) 10 MG TABLET    Take 1 tablet every day by oral route.    DORZOLAMIDE-TIMOLOL 2-0.5% (COSOPT) 22.3-6.8 MG/ML OPHTHALMIC SOLUTION    place 1 drop in left eye twice a day  for 30 days    FLUTICASONE PROPIONATE (FLONASE ALLERGY RELIEF) 50 MCG/ACTUATION NASAL SPRAY    [place] 1 spray every day by intranasal route.    GABAPENTIN (NEURONTIN) 300 MG CAPSULE    Take 2 capsules twice a day by oral route for 90 days.    HYDRALAZINE (APRESOLINE) 50 MG TABLET    Take 1 tablet (50 mg total) by mouth every 8 (eight) hours.    HYDROCODONE-ACETAMINOPHEN (NORCO) 5-325 MG PER TABLET    Take 1 tablet by mouth every 4 (four) hours as needed for Pain (flank pain).    INSULIN ASPART U-100 (NOVOLOG) 100 UNIT/ML (3 ML) INPN PEN    Inject 8 Units into the skin 3 (three) times daily with meals.    LANCETS (TRUEPLUS LANCETS) 33 GAUGE MISC    Use 1 to check blood glucose three times daily    LEVETIRACETAM (KEPPRA) 500 MG TAB    Take 1 tablet (500 mg total) by mouth 2 (two) times a day.    PANTOPRAZOLE (PROTONIX) 40 MG TABLET    Take 1 tablet (40 mg total) by mouth once daily.    PEN NEEDLE, DIABETIC 31 GAUGE X 3/16" NDLE    Use as directed with insulin once daily    PREDNISOLONE ACETATE (PRED FORTE) 1 % DRPS    Place 1 drop into the left eye 2 (two) times daily.    SEVELAMER CARBONATE (RENVELA) 800 MG TAB    Take 1 tablet (800 mg total) by mouth 3 (three) times daily with meals.    SODIUM BICARBONATE 650 MG TABLET    Take 1 tablet (650 mg total) by mouth 3 (three) times daily.    SUCRALFATE (CARAFATE) 1 GRAM TABLET    Take 1 tablet (1 g total) by mouth 4 (four) times daily.    TIMOLOL MALEATE 0.5% (TIMOPTIC) 0.5 % DROP    Place 1 drop in left eye 2 times a day for 30 " days   Modified Medications    No medications on file   Discontinued Medications    No medications on file       PMH:  As per HPI and below:  Past Medical History:   Diagnosis Date    ESRD on hemodialysis 2022    Gastritis 2022    EGD was 22    Gastroparesis 2022    has not had Emptying study    Heart failure with preserved ejection fraction 2022    EF 55% on 3/22    History of supraventricular tachycardia     Hyperkalemia 2022    Hypertensive emergency 2022    Sickle cell trait 2022    Type 2 diabetes mellitus      Past Surgical History:   Procedure Laterality Date     SECTION      x 3    COLONOSCOPY      COLONOSCOPY N/A 2022    Procedure: COLONOSCOPY;  Surgeon: Jagdeep Cedeno MD;  Location: [facility];  Service: Endoscopy;  Laterality: N/A;    DESTRUCTION, CILIARY BODY, USING LASER Left 2024    Procedure: Cyclophotocoagulation G-probe, retrobulbar chlorpromazine left eye;  Surgeon: Fidel Wise MD;  Location: [address];  Service: Ophthalmology;  Laterality: Left;    ENDOSCOPIC ULTRASOUND OF UPPER GASTROINTESTINAL TRACT N/A 2023    Procedure: ULTRASOUND, UPPER GI TRACT, ENDOSCOPIC;  Surgeon: Micky Paredes III, MD;  Location: [facility];  Service: Endoscopy;  Laterality: N/A;    ESOPHAGOGASTRODUODENOSCOPY N/A 10/18/2019    Procedure: ESOPHAGOGASTRODUODENOSCOPY (EGD);  Surgeon: Gianluca Mendez MD;  Location: [facility];  Service: Endoscopy;  Laterality: N/A;    ESOPHAGOGASTRODUODENOSCOPY N/A 2022    Procedure: EGD (ESOPHAGOGASTRODUODENOSCOPY);  Surgeon: Micky Paredes III, MD;  Location: [facility];  Service: Endoscopy;  Laterality: N/A;    ESOPHAGOGASTRODUODENOSCOPY N/A 2022    Procedure: EGD (ESOPHAGOGASTRODUODENOSCOPY);  Surgeon: Marcelo Zhong MD;  Location: [facility];  Service: Endoscopy;  Laterality: N/A;    ESOPHAGOGASTRODUODENOSCOPY N/A 2024    Procedure: EGD (ESOPHAGOGASTRODUODENOSCOPY);  Surgeon:  Marcelo Zhong MD;  Location: [facility];  Service: Endoscopy;  Laterality: N/A;    EYE SURGERY      INSERTION, CATHETER, TUNNELED N/A 06/17/2023    Procedure: Insertion,catheter,tunneled;  Surgeon: Carlos Thurman Jr., MD;  Location: [facility] OR;  Service: General;  Laterality: N/A;    LAPAROSCOPIC CHOLECYSTECTOMY N/A 07/30/2022    Procedure: CHOLECYSTECTOMY, LAPAROSCOPIC;  Surgeon: Grey Perez MD;  Location: [facility] OR;  Service: General;  Laterality: N/A;    PLACEMENT OF DUAL-LUMEN VASCULAR CATHETER Left 07/12/2022    Procedure: INSERTION-CATHETER-JOSEPH;  Surgeon: Dionte Gan MD;  Location: [facility] OR;  Service: General;  Laterality: Left;    PLACEMENT OF DUAL-LUMEN VASCULAR CATHETER Right 07/26/2022    Procedure: INSERTION-CATHETER-Hemosplit;  Surgeon: Dionte Gan MD;  Location: [facility] OR;  Service: General;  Laterality: Right;       Social History     Socioeconomic History    Marital status:    Tobacco Use    Smoking status: Never    Smokeless tobacco: Never   Substance and Sexual Activity    Alcohol use: Not Currently    Drug use: No    Sexual activity: Not Currently     Partners: Male     Birth control/protection: I.U.D.     Social Drivers of Health     Financial Resource Strain: Patient Declined (10/24/2024)    Overall Financial Resource Strain (CARDIA)     Difficulty of Paying Living Expenses: Patient declined   Recent Concern: Financial Resource Strain - High Risk (10/23/2024)    Overall Financial Resource Strain (CARDIA)     Difficulty of Paying Living Expenses: Hard   Food Insecurity: Patient Declined (10/24/2024)    Hunger Vital Sign     Worried About Running Out of Food in the Last Year: Patient declined     Ran Out of Food in the Last Year: Patient declined   Recent Concern: Food Insecurity - Food Insecurity Present (8/25/2024)    Hunger Vital Sign     Worried About Running Out of Food in the Last Year: Often true     Ran Out of Food in the Last Year: Often true   Transportation  Needs: Patient Declined (10/24/2024)    TRANSPORTATION NEEDS     Transportation : Patient declined   Recent Concern: Transportation Needs - Unmet Transportation Needs (10/23/2024)    TRANSPORTATION NEEDS     Transportation : Yes, it has kept me from medical appointments or from getting my medications.   Physical Activity: Insufficiently Active (10/23/2024)    Exercise Vital Sign     Days of Exercise per Week: 7 days     Minutes of Exercise per Session: 20 min   Stress: Patient Declined (10/24/2024)    [employer] - Occupational Stress Questionnaire     Feeling of Stress : Patient declined   Recent Concern: Stress - Stress Concern Present (8/25/2024)    [employer] - Occupational Stress Questionnaire     Feeling of Stress : Rather much   Housing Stability: Patient Declined (10/24/2024)    Housing Stability Vital Sign     Unable to Pay for Housing in the Last Year: Patient declined     Homeless in the Last Year: Patient declined   Recent Concern: Housing Stability - High Risk (10/23/2024)    Housing Stability Vital Sign     Unable to Pay for Housing in the Last Year: Yes     Homeless in the Last Year: No       Family History   Problem Relation Name Age of Onset    Diabetes Mother      Diabetes Father         Physical Exam:    Vitals:    11/17/24 0142   BP: (!) 145/107   Pulse: 105   Resp: (!) 28   Temp:      GENERAL:  Uncomfortable secondary to pain.  Alert.    HEENT:  Moist mucous membranes.  Normocephalic and atraumatic.    NECK:  No swelling.  Midline trachea.   CARDIOVASCULAR:  Regular rate and rhythm.  2+ radial pulses.  No murmur.  PULMONARY:  Lungs clear to auscultation bilaterally.  No wheezes, rales, or rhonci.    ABDOMEN:  Non-tender and non-distended.    EXTREMITIES:  Warm and well perfused.  Brisk capillary refill.  No peripheral edema.  Unlabored respirations.  Left brachial fistula with palpable thrill.  NEUROLOGICAL:  Normal mental status.   Appropriate and conversant.  5/5 strength in the bilateral lower extremities with equal sensation to light touch.  SKIN:  No rashes or ecchymoses.    BACK:  Atraumatic.  No CVA or other back tenderness to palpation.      Labs Reviewed   CBC W/ AUTO DIFFERENTIAL - Abnormal       Result Value    WBC 12.38      RBC 2.86 (*)     Hemoglobin 8.2 (*)     Hematocrit 25.4 (*)     MCV 89      MCH 28.7      MCHC 32.3      RDW 16.8 (*)     Platelets 126 (*)     MPV 10.9      Immature Granulocytes 0.3      Gran # (ANC) 10.0 (*)     Immature Grans (Abs) 0.04      Lymph # 0.9 (*)     Mono # 1.1 (*)     Eos # 0.3      Baso # 0.04      nRBC 0      Gran % 81.0 (*)     Lymph % 7.4 (*)     Mono % 8.7      Eosinophil % 2.3      Basophil % 0.3      Differential Method Automated     BASIC METABOLIC PANEL - Abnormal    Sodium 141      Potassium 7.1 (*)     Chloride 105      CO2 17 (*)     Glucose 179 (*)     BUN 99 (*)     Creatinine 12.1 (*)     Calcium 9.5      Anion Gap 19 (*)     eGFR 3.8 (*)    POCT GLUCOSE - Abnormal    POCT Glucose 189 (*)    POCT GLUCOSE MONITORING CONTINUOUS       Current Discharge Medication List        CONTINUE these medications which have NOT CHANGED    Details   apixaban (ELIQUIS) 5 mg Tab Take 1 tablet (5 mg total) by mouth 2 (two) times daily. Patient w/ anemia, so held during admission, follow-up with hematologist about restarting      atropine 1% (ISOPTO ATROPINE) 1 % Drop Place 1 drop into the left eye 2 (two) times a day.  Qty: 15 mL, Refills: 3      blood sugar diagnostic (TRUE METRIX GLUCOSE TEST STRIP) Strp Use 1 strip to check blood glucose three times daily  Qty: 100 each, Refills: 11      blood-glucose meter (TRUE METRIX GLUCOSE METER) Misc Use to check blood glucose  Qty: 1 each, Refills: 0      blood-glucose meter,continuous Misc USE WITH SENSORS  Qty: 1 each, Refills: 0      !! blood-glucose sensor (DEXCOM G7 SENSOR) Heather Use as directed for CGM. Change sensor every 10 days  Qty: 3 each,  Refills: 0    Comments: 3 ok per md due to 30 day supply - 9/3/24      !! blood-glucose sensor Heather CHANGE EVERY 10 DAYS  Qty: 3 each, Refills: 0      brimonidine 0.15 % OPTH DROP (ALPHAGAN) 0.15 % ophthalmic solution Place 1 drop into both eyes 3 (three) times daily.  Qty: 15 mL, Refills: 3      brinzolamide (AZOPT) 1 % ophthalmic suspension Place1 drop in left eye 3 times a day for 30 days  Qty: 15 mL, Refills: 6      busPIRone (BUSPAR) 10 MG tablet Take 1 tablet (10 mg total) by mouth 3 (three) times daily as needed (anxiety).  Qty: 60 tablet, Refills: 5      cetirizine (ZYRTEC) 10 MG tablet Take 1 tablet every day by oral route.  Qty: 30 tablet, Refills: 0      dorzolamide-timolol 2-0.5% (COSOPT) 22.3-6.8 mg/mL ophthalmic solution place 1 drop in left eye twice a day  for 30 days  Qty: 10 mL, Refills: 0      fluticasone propionate (FLONASE ALLERGY RELIEF) 50 mcg/actuation nasal spray [place] 1 spray every day by intranasal route.  Qty: 16 g, Refills: 2      gabapentin (NEURONTIN) 300 MG capsule Take 2 capsules twice a day by oral route for 90 days.  Qty: 360 capsule, Refills: 1      hydrALAZINE (APRESOLINE) 50 MG tablet Take 1 tablet (50 mg total) by mouth every 8 (eight) hours.    Comments: .      HYDROcodone-acetaminophen (NORCO) 5-325 mg per tablet Take 1 tablet by mouth every 4 (four) hours as needed for Pain (flank pain).  Qty: 20 tablet, Refills: 0    Comments: Quantity prescribed more than 7 day supply? No      insulin aspart U-100 (NOVOLOG) 100 unit/mL (3 mL) InPn pen Inject 8 Units into the skin 3 (three) times daily with meals.  Qty: 15 mL, Refills: 0      lancets (TRUEPLUS LANCETS) 33 gauge Misc Use 1 to check blood glucose three times daily  Qty: 100 each, Refills: 11      levETIRAcetam (KEPPRA) 500 MG Tab Take 1 tablet (500 mg total) by mouth 2 (two) times a day.  Qty: 60 tablet, Refills: 2      pantoprazole (PROTONIX) 40 MG tablet Take 1 tablet (40 mg total) by mouth once daily.  Qty: 30 tablet,  "Refills: 0      pen needle, diabetic 31 gauge x 3/16" Ndle Use as directed with insulin once daily  Qty: 100 each, Refills: 0      prednisoLONE acetate (PRED FORTE) 1 % DrpS Place 1 drop into the left eye 2 (two) times daily.  Qty: 5 mL, Refills: 3      sevelamer carbonate (RENVELA) 800 mg Tab Take 1 tablet (800 mg total) by mouth 3 (three) times daily with meals.  Qty: 180 tablet, Refills: 11      sodium bicarbonate 650 MG tablet Take 1 tablet (650 mg total) by mouth 3 (three) times daily.  Qty: 270 tablet, Refills: 3      sucralfate (CARAFATE) 1 gram tablet Take 1 tablet (1 g total) by mouth 4 (four) times daily.  Qty: 100 tablet, Refills: 1      timolol maleate 0.5% (TIMOPTIC) 0.5 % Drop Place 1 drop in left eye 2 times a day for 30 days  Qty: 5 mL, Refills: 6       !! - Potential duplicate medications found. Please discuss with provider.          Orders Placed This Encounter   Procedures    Critical Care    X-Ray Chest 1 View    CBC auto differential    Basic metabolic panel (BMP)    Inpatient consult to Nephrology    EKG 12-lead    Prepare patient for dialysis    Hemodialysis inpatient If "per protocol" is selected for one or more ingredients (K+, Ca++, Na+, Bicarb) for the dialysate bath solution, select the hyperlink for the protocol instructions.    Possible Hospitalization       Imaging Results              X-Ray Chest 1 View (Final result)  Result time 11/17/24 02:00:08      Final result by Kaushik Diego MD (11/17/24 02:00:08)                   Impression:      Abnormal chest radiograph as above..      Electronically signed by: Kaushik Diego MD  Date:    11/17/2024  Time:    02:00               Narrative:    EXAMINATION:  XR CHEST 1 VIEW    CLINICAL HISTORY:  Dialysis noncompliance, r/o edema;    TECHNIQUE:  Single frontal view of the chest was performed.    COMPARISON:  09/17/2024, 09/14/2024    FINDINGS:  Cardiac monitoring leads overlie the chest.  There is enlargement of the cardiomediastinal " silhouette, increased in conspicuity compared to prior study of 09/17/2024.  There is central vascular congestion and bilateral perihilar opacities suggestive of edema.  Possible small volume of right-sided pleural fluid noted.  No evidence of pneumothorax.  Osseous structures appear intact.                                      ED Course as of 11/17/24 0307   Sun Nov 17, 2024   0233 EKG:  NSR, rate of 95, normal intervals except prolonged QTc at 507 ms.  R axis.  There are no acute ST or T wave changes suggestive of acute ischemia or infarction.  No peaked T-wave or QRS widening suggestive of hyperkalemia. (Independently interpreted by me)   [MR]   0300 Discussed with Dr. Benitez on-call for Nephrology who will arrange emergent dialysis for hyperkalemia related to dialysis noncompliance. [MR]      ED Course User Index  [MR] Kaushik Patton MD       MDM:    34 y.o. female with recurrence of back pain in the bilateral flank region she has had numerous times in the past.  This is a chronic issue of a recurrent nature and I do not think opiate analgesia is appropriate in this instance.  I will offer analgesia with acetaminophen and topical lidocaine patch.  She is anuric and I doubt urinary process such as UTI.  There is no abdominal pain.  I have very low suspicion for life-threatening intra-abdominal process such as cholecystitis, abscess, appendicitis.  I do not think abdominal imaging is indicated.  Laboratories sent with consideration of recent dialysis noncompliance to rule out electrolyte derangement.  Lungs are clear on exam with chest x-ray done to exclude pulmonary edema.  This x-ray does suggest some pulmonary edema although patient without oxygen requirement or increased work of breathing.  She additionally has hyperkalemia without significant EKG changes apart from mildly prolonged QTC.  IV calcium as well as intracellular shifting with albuterol, insulin, dextrose ordered.  Consult to Nephrology  placed to help arranged dialysis and I will admit to Hospital Medicine.    Diagnoses:    1. Bilateral flank pain  2. Dialysis noncompliance  3. Hyperkalemia  4. Pulmonary edema    Critical Care    Date/Time: 11/17/2024 3:07 AM    Performed by: Kaushik Patton MD  Authorized by: Kaushik Patton MD  Direct patient critical care time: 14 minutes  Additional history critical care time: 5 minutes  Ordering / reviewing critical care time: 7 minutes  Documentation critical care time: 5 minutes  Consulting other physicians critical care time: 5 minutes  Total critical care time (exclusive of procedural time) : 36 minutes  Critical care time was exclusive of separately billable procedures and treating other patients.               Kaushik Patton MD  11/17/24 0307

## 2024-11-17 NOTE — ED NOTES
Report call to the floor. Gave report to nurse Roro. Told her that patient is currently receiving dialysis and will go to room 1213 when dialysis is completed. Dialysis RN said it should take about 3 hours

## 2024-11-17 NOTE — RESPIRATORY THERAPY
11/17/24 0314   Patient Assessment/Suction   Level of Consciousness (AVPU) alert   Respiratory Effort Normal;Unlabored   PRE-TX-O2   Device (Oxygen Therapy) room air   SpO2 96 %   Pulse Oximetry Type Continuous   $ Pulse Oximetry - Multiple Charge Pulse Oximetry - Multiple   Pulse 93   Resp (!) 23   Aerosol Therapy   $ Aerosol Therapy Charges Initial Continuous   Daily Review of Necessity (SVN) completed   Respiratory Treatment Status (SVN) continuous   Treatment Route (SVN) mask;oxygen   Patient Position Flores's   Post Treatment Assessment (SVN) breath sounds unchanged   Signs of Intolerance (SVN) none   Education   $ Education Bronchodilator;15 min

## 2024-11-17 NOTE — ASSESSMENT & PLAN NOTE
Hyperkalemia is likely due to ESRD.The patients most recent potassium results are listed below.  Recent Labs     11/17/24  0208 11/17/24  0417 11/17/24  1038   K 7.1* 6.2* 4.9       Plan  - Monitor for arrhythmias with EKG and/or continuous telemetry.   - Treat the hyperkalemia with Potassium Binders, Calcium gluconate, IV insulin and dextrose, and Nebulized albuterol sulfate.   - Monitor potassium: Daily

## 2024-11-17 NOTE — PROGRESS NOTES
"Pharmacist Renal Dose Adjustment Note    Tabby Howard is a 34 y.o. female being treated with the medication Apixaban    Patient Data:    Vital Signs (Most Recent):  Temp: 96.8 °F (36 °C) (11/17/24 0129)  Pulse: 93 (11/17/24 0314)  Resp: (!) 23 (11/17/24 0314)  BP: (!) 162/72 (11/17/24 0300)  SpO2: 96 % (11/17/24 0314) Vital Signs (72h Range):  Temp:  [96.8 °F (36 °C)]   Pulse:  []   Resp:  [23-32]   BP: (145-186)/()   SpO2:  [93 %-100 %]        Ht: 5' 3" (1.6 m)  Wt: 54 kg (119 lb)  Estimated Creatinine Clearance: 5.4 mL/min (A) (based on SCr of 12.1 mg/dL (H)).  Body mass index is 21.08 kg/m².    Per [facility] renal dosing protocol:     Previous Order: Apixaban 5 mg BiD    Will be changed to:     New Order: Apixaban 2.5 mg BiD,    Due to: Per Pharmacy Protocol    Renal dose adjustments performed as noted above.    We will continue monitoring and adjusting as necessary.    Pharmacist: Miah Maloney, PharmD  Ext: [phone]      "

## 2024-11-17 NOTE — ASSESSMENT & PLAN NOTE
Points to BL hip vs CVA  Non-tender to palpation  Suspect anemia and FVO contributory  Hip XR - denies recent trauma  Pain management

## 2024-11-17 NOTE — H&P
[facility] - Emergency Dept  Hospital Medicine  History & Physical    Patient Name: Tabby Howard  MRN: [id number]  Patient Class: OP- Observation  Admission Date: 11/17/2024  Attending Physician: Jimena Freeman MD   Primary Care Provider: Melony Kim NP         Patient information was obtained from patient and ER records.     Subjective:     Principal Problem:ESRD (end stage renal disease)    Chief Complaint:   Chief Complaint   Patient presents with    Flank Pain     Dialysis pt and did not go to dialysis        HPI: 34 year old female with PMH of ESRD on HD(TTS), HFpEF, h/o SVT, gastritis, gastroparesis, sickle cell trait, insulin dependent type 2 diabetes mellitis presents due to bilateral flank pain which which patient has presented with several times in the past.  All previous work up has been negative and patient has history of asking for opioid medications.  Reports missing last two dialysis sessions (Last HD on Tuesday).  States she was unable to get a ride since she did not have gas money.  Denies abdominal pain, chest pain, dyspnea, swelling, radiation of pain, fevers, numbness/weakness. Patient is anuric.  Bloodwork revealed potassium of 7.1.    ED physician made clear to patient we will try to control pain without opioids and that she will be admitted for emergent dialysis.  Nephrology was consulted - recommended immediate dialysis for severe hyperkalemia.  Patient was given insulin/dextorse, calcium gluconate, albuterol in ED.  Given acetaminophen and lidocaine patch for back pain.      Past Medical History:   Diagnosis Date    ESRD on hemodialysis 04/12/2022    Gastritis 12/2022    EGD was 12/5/22    Gastroparesis 04/12/2022    has not had Emptying study    Heart failure with preserved ejection fraction 04/12/2022    EF 55% on 3/22    History of supraventricular tachycardia     Hyperkalemia 07/07/2022    Hypertensive emergency 07/08/2022    Sickle cell trait 04/12/2022    Type 2  diabetes mellitus        Past Surgical History:   Procedure Laterality Date     SECTION      x 3    COLONOSCOPY      COLONOSCOPY N/A 2022    Procedure: COLONOSCOPY;  Surgeon: Jagdeep Cedeno MD;  Location: [facility];  Service: Endoscopy;  Laterality: N/A;    DESTRUCTION, CILIARY BODY, USING LASER Left 2024    Procedure: Cyclophotocoagulation G-probe, retrobulbar chlorpromazine left eye;  Surgeon: Fidel Wise MD;  Location: [address];  Service: Ophthalmology;  Laterality: Left;    ENDOSCOPIC ULTRASOUND OF UPPER GASTROINTESTINAL TRACT N/A 2023    Procedure: ULTRASOUND, UPPER GI TRACT, ENDOSCOPIC;  Surgeon: Micky Paredes III, MD;  Location: [facility];  Service: Endoscopy;  Laterality: N/A;    ESOPHAGOGASTRODUODENOSCOPY N/A 10/18/2019    Procedure: ESOPHAGOGASTRODUODENOSCOPY (EGD);  Surgeon: Gianluca Mendez MD;  Location: [facility];  Service: Endoscopy;  Laterality: N/A;    ESOPHAGOGASTRODUODENOSCOPY N/A 2022    Procedure: EGD (ESOPHAGOGASTRODUODENOSCOPY);  Surgeon: Micky Paredes III, MD;  Location: [facility];  Service: Endoscopy;  Laterality: N/A;    ESOPHAGOGASTRODUODENOSCOPY N/A 2022    Procedure: EGD (ESOPHAGOGASTRODUODENOSCOPY);  Surgeon: Marcelo Zhong MD;  Location: [facility];  Service: Endoscopy;  Laterality: N/A;    ESOPHAGOGASTRODUODENOSCOPY N/A 2024    Procedure: EGD (ESOPHAGOGASTRODUODENOSCOPY);  Surgeon: Marcelo Zhong MD;  Location: [facility];  Service: Endoscopy;  Laterality: N/A;    EYE SURGERY      INSERTION, CATHETER, TUNNELED N/A 2023    Procedure: Insertion,catheter,tunneled;  Surgeon: Carlos Thurman Jr., MD;  Location: [facility];  Service: General;  Laterality: N/A;    LAPAROSCOPIC CHOLECYSTECTOMY N/A 2022    Procedure: CHOLECYSTECTOMY, LAPAROSCOPIC;  Surgeon: Grey Perez MD;  Location: [facility];  Service: General;  Laterality: N/A;    PLACEMENT OF DUAL-LUMEN VASCULAR CATHETER Left 2022    Procedure:  "INSERTION-CATHETER-JOSEPH;  Surgeon: Dionte Gan MD;  Location: [facility] OR;  Service: General;  Laterality: Left;    PLACEMENT OF DUAL-LUMEN VASCULAR CATHETER Right 07/26/2022    Procedure: INSERTION-CATHETER-Hemosplit;  Surgeon: Dionte Gan MD;  Location: [facility] OR;  Service: General;  Laterality: Right;       Review of patient's allergies indicates:   Allergen Reactions    Droperidol Hives    Haldol [haloperidol lactate] Hives    Penicillins Hives    Droperidol (bulk) Anxiety     STATES "FREAKS OUT".  TOLERATES HALDOL PRIOR TO ARRIVAL AT ANOTHER FACILITY TODAY.        No current facility-administered medications on file prior to encounter.     Current Outpatient Medications on File Prior to Encounter   Medication Sig    apixaban (ELIQUIS) 5 mg Tab Take 1 tablet (5 mg total) by mouth 2 (two) times daily. Patient w/ anemia, so held during admission, follow-up with hematologist about restarting    atropine 1% (ISOPTO ATROPINE) 1 % Drop Place 1 drop into the left eye 2 (two) times a day.    blood sugar diagnostic (TRUE METRIX GLUCOSE TEST STRIP) Strp Use 1 strip to check blood glucose three times daily    blood-glucose meter (TRUE METRIX GLUCOSE METER) Misc Use to check blood glucose    blood-glucose meter,continuous Misc USE WITH SENSORS    blood-glucose sensor (DEXCOM G7 SENSOR) Heather Use as directed for CGM. Change sensor every 10 days    blood-glucose sensor Heather CHANGE EVERY 10 DAYS    brimonidine 0.15 % OPTH DROP (ALPHAGAN) 0.15 % ophthalmic solution Place 1 drop into both eyes 3 (three) times daily.    brinzolamide (AZOPT) 1 % ophthalmic suspension Place1 drop in left eye 3 times a day for 30 days    busPIRone (BUSPAR) 10 MG tablet Take 1 tablet (10 mg total) by mouth 3 (three) times daily as needed (anxiety).    cetirizine (ZYRTEC) 10 MG tablet Take 1 tablet every day by oral route.    dorzolamide-timolol 2-0.5% (COSOPT) 22.3-6.8 mg/mL ophthalmic solution place 1 drop in left eye twice a day  for 30 days    " "fluticasone propionate (FLONASE ALLERGY RELIEF) 50 mcg/actuation nasal spray [place] 1 spray every day by intranasal route.    gabapentin (NEURONTIN) 300 MG capsule Take 2 capsules twice a day by oral route for 90 days.    hydrALAZINE (APRESOLINE) 50 MG tablet Take 1 tablet (50 mg total) by mouth every 8 (eight) hours.    HYDROcodone-acetaminophen (NORCO) 5-325 mg per tablet Take 1 tablet by mouth every 4 (four) hours as needed for Pain (flank pain).    insulin aspart U-100 (NOVOLOG) 100 unit/mL (3 mL) InPn pen Inject 8 Units into the skin 3 (three) times daily with meals.    lancets (TRUEPLUS LANCETS) 33 gauge Misc Use 1 to check blood glucose three times daily    levETIRAcetam (KEPPRA) 500 MG Tab Take 1 tablet (500 mg total) by mouth 2 (two) times a day.    pantoprazole (PROTONIX) 40 MG tablet Take 1 tablet (40 mg total) by mouth once daily.    pen needle, diabetic 31 gauge x 3/16" Ndle Use as directed with insulin once daily    prednisoLONE acetate (PRED FORTE) 1 % DrpS Place 1 drop into the left eye 2 (two) times daily.    sevelamer carbonate (RENVELA) 800 mg Tab Take 1 tablet (800 mg total) by mouth 3 (three) times daily with meals.    sodium bicarbonate 650 MG tablet Take 1 tablet (650 mg total) by mouth 3 (three) times daily.    sucralfate (CARAFATE) 1 gram tablet Take 1 tablet (1 g total) by mouth 4 (four) times daily.    timolol maleate 0.5% (TIMOPTIC) 0.5 % Drop Place 1 drop in left eye 2 times a day for 30 days    [DISCONTINUED] albuterol (VENTOLIN HFA) 90 mcg/actuation inhaler Inhale 2 puffs every 4 hours by inhalation route.    [DISCONTINUED] atenoloL (TENORMIN) 50 MG tablet Take 1 tablet (50 mg total) by mouth every other day.    [DISCONTINUED] FLUoxetine 40 MG capsule Take 1 capsule every day by oral route.    [DISCONTINUED] insulin detemir U-100, Levemir, (LEVEMIR FLEXTOUCH U100 INSULIN) 100 unit/mL (3 mL) InPn pen Inject 22 units every day by subcutaneous route in the evening for 90 days.    " [DISCONTINUED] omeprazole (PRILOSEC) 20 MG capsule Take 2 capsules (40 mg total) by mouth once daily. for 10 days     Family History       Problem Relation (Age of Onset)    Diabetes Mother, Father          Tobacco Use    Smoking status: Never    Smokeless tobacco: Never   Substance and Sexual Activity    Alcohol use: Not Currently    Drug use: No    Sexual activity: Not Currently     Partners: Male     Birth control/protection: I.U.D.     Review of Systems   Constitutional:  Negative for chills and fever.   HENT:  Negative for sore throat.    Eyes:  Negative for visual disturbance.   Respiratory:  Negative for cough, chest tightness and shortness of breath.    Cardiovascular:  Negative for chest pain, palpitations and leg swelling.   Gastrointestinal:  Negative for abdominal pain, constipation and vomiting.   Genitourinary:  Positive for flank pain.   Musculoskeletal:  Positive for back pain. Negative for neck pain.   Neurological:  Negative for dizziness, seizures, syncope and headaches.     Objective:     Vital Signs (Most Recent):  Temp: 96.8 °F (36 °C) (11/17/24 0129)  Pulse: 93 (11/17/24 0314)  Resp: (!) 23 (11/17/24 0314)  BP: (!) 162/72 (11/17/24 0300)  SpO2: 96 % (11/17/24 0314) Vital Signs (24h Range):  Temp:  [96.8 °F (36 °C)] 96.8 °F (36 °C)  Pulse:  [] 93  Resp:  [23-32] 23  SpO2:  [93 %-100 %] 96 %  BP: (145-186)/() 162/72     Weight: 54 kg (119 lb)  Body mass index is 21.08 kg/m².     Physical Exam  Constitutional:       Appearance: She is not toxic-appearing or diaphoretic.      Comments: Patient is complaining of back pain, rocking back and forth, restless   HENT:      Head: Atraumatic.      Nose: No rhinorrhea.      Mouth/Throat:      Mouth: Mucous membranes are moist.   Cardiovascular:      Rate and Rhythm: Normal rate and regular rhythm.      Pulses: Normal pulses.      Heart sounds: Normal heart sounds. No murmur heard.  Pulmonary:      Effort: Pulmonary effort is normal. No  respiratory distress.      Breath sounds: Normal breath sounds. No wheezing or rales.   Abdominal:      General: There is no distension.      Palpations: Abdomen is soft.   Musculoskeletal:      Right lower leg: No edema.      Left lower leg: No edema.   Skin:     General: Skin is warm and dry.   Neurological:      General: No focal deficit present.      Mental Status: She is alert.                Significant Labs: All pertinent labs within the past 24 hours have been reviewed.  BMP:   Recent Labs   Lab 11/17/24 0208   *      K 7.1*      CO2 17*   BUN 99*   CREATININE 12.1*   CALCIUM 9.5     CBC:   Recent Labs   Lab 11/17/24 0208   WBC 12.38   HGB 8.2*   HCT 25.4*   *     CMP:   Recent Labs   Lab 11/17/24 0208      K 7.1*      CO2 17*   *   BUN 99*   CREATININE 12.1*   CALCIUM 9.5   ANIONGAP 19*       Significant Imaging: I have reviewed all pertinent imaging results/findings within the past 24 hours.  Assessment/Plan:     * ESRD (end stage renal disease)  Creatine stable for now. BMP reviewed- noted Estimated Creatinine Clearance: 5.4 mL/min (A) (based on SCr of 12.1 mg/dL (H)). according to latest data. Based on current GFR, CKD stage is end stage.  Patient missed last two sessions of dialysis (TTS)    Monitor UOP and serial BMP and adjust therapy as needed.   Renally dose meds.   Avoid nephrotoxic medications and procedures.  Nephrology consulted - recommended immediate dialysis for severe hyperkalemia     Hyperkalemia  Hyperkalemia is likely due to ESRD.The patients most recent potassium results are listed below.  Recent Labs     11/17/24 0208   K 7.1*     Plan  - Monitor for arrhythmias with EKG and/or continuous telemetry.   - Treat the hyperkalemia with Potassium Binders, Calcium gluconate, IV insulin and dextrose, and Nebulized albuterol sulfate.   - Monitor potassium: Daily          Deep vein thrombosis (DVT) of non-extremity vein  Continnue anticoagulation  "with eliquis      Type 2 diabetes mellitus with hyperglycemia, with long-term current use of insulin, previous HbA1c 5.8%  Continue insulin aspart 8 units with each meal  POCT/ISS      Drug-seeking behavior  Patient has history of presenting to ED requesting opioids for bilateral flank pain.  Patient agrees to non-opioid medication regimen while awaiting emergent dialysis    PRN pain control - acetaminophen, lidocaine patch         VTE Risk Mitigation (From admission, onward)           Ordered     apixaban tablet 2.5 mg  2 times daily         11/17/24 0330     Reason for No Pharmacological VTE Prophylaxis  Once        Question:  Reasons:  Answer:  Already adequately anticoagulated on oral Anticoagulants    11/17/24 0328     IP VTE HIGH RISK PATIENT  Once         11/17/24 0328     Place sequential compression device  Until discontinued         11/17/24 0328                       On 11/17/2024, patient should be placed in hospital observation services under my care.        Pharmacist Renal Dose Adjustment Note    Tabby Howard is a 34 y.o. female being treated with the medication Apixaban    Patient Data:    Vital Signs (Most Recent):  Temp: 96.8 °F (36 °C) (11/17/24 0129)  Pulse: 93 (11/17/24 0314)  Resp: (!) 23 (11/17/24 0314)  BP: (!) 162/72 (11/17/24 0300)  SpO2: 96 % (11/17/24 0314) Vital Signs (72h Range):  Temp:  [96.8 °F (36 °C)]   Pulse:  []   Resp:  [23-32]   BP: (145-186)/()   SpO2:  [93 %-100 %]        Ht: 5' 3" (1.6 m)  Wt: 54 kg (119 lb)  Estimated Creatinine Clearance: 5.4 mL/min (A) (based on SCr of 12.1 mg/dL (H)).  Body mass index is 21.08 kg/m².    Per [facility] renal dosing protocol:     Previous Order: Apixaban 5 mg BiD    Will be changed to:     New Order: Apixaban 2.5 mg BiD,    Due to: Per Pharmacy Protocol    Renal dose adjustments performed as noted above.    We will continue monitoring and adjusting as necessary.    Pharmacist: Miah Maloney, PharmD  Ext: [phone]        Jimena Freeman, " MD  Department of Hospital Medicine  [facility] - Emergency Dept

## 2024-11-17 NOTE — CONSULTS
Consult Note  Nephrology    Consult Requested By: Kelly Thomas DO    Reason for Consult: ESRD, dependent on dialysis    SUBJECTIVE:     History of Present Illness:   35 y/o female patient known to our practice, has out patient dialysis 3x wk on TTS schedule.  She presented to ER due to bilateral flank pain which which patient has presented with several times in the past. Missed the last 2 HD treatments.  K+7.1.  nephrology is consulted for urgent dialysis.      VSS, K+ down to 6.2 after temporizing measures, now s/p HD.  C/o back pain, crying and squirming around.    Assessment/plan:    ESRD  Hyperkalemia, life threatening  Metabolic acidosis  Anemia of chronic dz  SHPT  DM    --HD on TTS per pt's schedule.  Will likely need additional treatments this admit, will monitor K+ levels.  Dose all medications for dialysis  --urgent HD this am  --increase bicarb in dialysate  --Hgb low, likely dilutional.  Will f/u CBC in am  --continue sevelemer w/meals  --Blood glucose control per primary team.          Past Medical History:   Diagnosis Date    ESRD on hemodialysis 2022    Gastritis 2022    EGD was 22    Gastroparesis 2022    has not had Emptying study    Heart failure with preserved ejection fraction 2022    EF 55% on 3/22    History of supraventricular tachycardia     Hyperkalemia 2022    Hypertensive emergency 2022    Sickle cell trait 2022    Type 2 diabetes mellitus      Past Surgical History:   Procedure Laterality Date     SECTION      x 3    COLONOSCOPY      COLONOSCOPY N/A 2022    Procedure: COLONOSCOPY;  Surgeon: Jagdeep Cedeno MD;  Location: [facility];  Service: Endoscopy;  Laterality: N/A;    DESTRUCTION, CILIARY BODY, USING LASER Left 2024    Procedure: Cyclophotocoagulation G-probe, retrobulbar chlorpromazine left eye;  Surgeon: Fidel Wise MD;  Location: [facility] OR [address] [place] [address];  Service: Ophthalmology;  Laterality: Left;    ENDOSCOPIC  ULTRASOUND OF UPPER GASTROINTESTINAL TRACT N/A 12/16/2023    Procedure: ULTRASOUND, UPPER GI TRACT, ENDOSCOPIC;  Surgeon: Micky Paredes III, MD;  Location: [facility];  Service: Endoscopy;  Laterality: N/A;    ESOPHAGOGASTRODUODENOSCOPY N/A 10/18/2019    Procedure: ESOPHAGOGASTRODUODENOSCOPY (EGD);  Surgeon: Gianluca Mendez MD;  Location: [facility];  Service: Endoscopy;  Laterality: N/A;    ESOPHAGOGASTRODUODENOSCOPY N/A 08/24/2022    Procedure: EGD (ESOPHAGOGASTRODUODENOSCOPY);  Surgeon: Micky Paredes III, MD;  Location: [facility];  Service: Endoscopy;  Laterality: N/A;    ESOPHAGOGASTRODUODENOSCOPY N/A 12/05/2022    Procedure: EGD (ESOPHAGOGASTRODUODENOSCOPY);  Surgeon: Marcelo Zhong MD;  Location: [facility];  Service: Endoscopy;  Laterality: N/A;    ESOPHAGOGASTRODUODENOSCOPY N/A 9/13/2024    Procedure: EGD (ESOPHAGOGASTRODUODENOSCOPY);  Surgeon: Marcelo Zhong MD;  Location: [facility];  Service: Endoscopy;  Laterality: N/A;    EYE SURGERY      INSERTION, CATHETER, TUNNELED N/A 06/17/2023    Procedure: Insertion,catheter,tunneled;  Surgeon: Carlos Thurman Jr., MD;  [facility];  Service: General;  Laterality: N/A;    LAPAROSCOPIC CHOLECYSTECTOMY N/A 07/30/2022    Procedure: CHOLECYSTECTOMY, LAPAROSCOPIC;  Surgeon: Grey Perez MD;  Location: [facility] OR;  Service: General;  Laterality: N/A;    PLACEMENT OF DUAL-LUMEN VASCULAR CATHETER Left 07/12/2022    Procedure: INSERTION-CATHETER-JOSEPH;  Surgeon: Dionte Gan MD;  Location: [facility] OR;  Service: General;  Laterality: Left;    PLACEMENT OF DUAL-LUMEN VASCULAR CATHETER Right 07/26/2022    Procedure: INSERTION-CATHETER-Hemosplit;  Surgeon: Dionte Gan MD;  Location: [facility] OR;  Service: General;  Laterality: Right;     Family History   Problem Relation Name Age of Onset    Diabetes Mother      Diabetes Father       Social History     Tobacco Use    Smoking status: Never    Smokeless tobacco: Never   Substance Use Topics     "Alcohol use: Not Currently    Drug use: No       Review of patient's allergies indicates:   Allergen Reactions    Droperidol Hives    Haldol [haloperidol lactate] Hives    Penicillins Hives    Droperidol (bulk) Anxiety     STATES "FREAKS OUT".  TOLERATES HALDOL PRIOR TO ARRIVAL AT ANOTHER FACILITY TODAY.         Review of Systems:  As above    OBJECTIVE:     Vital Signs Range (Last 24H):  Temp:  [96.8 °F (36 °C)-98.5 °F (36.9 °C)]   Pulse:  []   Resp:  [18-32]   BP: (108-186)/()   SpO2:  [88 %-100 %]     Physical Exam:  General- NAD noted  HEENT- WNL  Neck- supple  CV- Regular rate and rhythm  Resp-  No increased WOB  GI- Non tender/non-distended  Extrem- No cyanosis, clubbing, edema.  Derm- skin w/d  Neuro-  No flap.     Body mass index is 21.08 kg/m².    Laboratory:  CBC:   Recent Labs   Lab 11/17/24 0417   WBC 11.43   RBC 2.69*   HGB 7.4*   HCT 24.1*   *   MCV 90   MCH 27.5   MCHC 30.7*     CMP:   Recent Labs   Lab 11/10/24  1933 11/17/24  0208 11/17/24 0417   *   < > 130*   CALCIUM 10.0   < > 9.7   ALBUMIN 3.7  --   --    PROT 7.2  --   --       < > 141   K 5.2*   < > 6.2*   CO2 21*   < > 17*      < > 107   BUN 40*   < > 100*   CREATININE 6.6*   < > 12.4*   ALKPHOS 201*  --   --    ALT 26  --   --    AST 24  --   --    BILITOT 2.8*  --   --     < > = values in this interval not displayed.       Diagnostic Results:  Labs: Reviewed      ASSESSMENT/PLAN:     Active Hospital Problems    Diagnosis  POA    *ESRD (end stage renal disease) [N18.6]  Yes    Type 2 diabetes mellitus with hyperglycemia, with long-term current use of insulin, previous HbA1c 5.8% [E11.65, Z79.4]  Not Applicable    Drug-seeking behavior [Z76.5]  Yes    Deep vein thrombosis (DVT) of non-extremity vein [I82.90]  Yes     Chronic    Hyperkalemia [E87.5]  Yes      Resolved Hospital Problems   No resolved problems to display.         Thank you for allowing us to participate in the care of your patient. We will " follow the patient and provide recommendations as needed.      Time spent seeing patient( greater than 1/2 spent in direct contact) :     Geeta Kaur NP

## 2024-11-17 NOTE — ASSESSMENT & PLAN NOTE
Patient has history of presenting to ED requesting opioids for bilateral flank pain.  Patient agrees to non-opioid medication regimen while awaiting emergent dialysis    PRN pain control - acetaminophen, lidocaine patch

## 2024-11-18 ENCOUNTER — HOSPITAL ENCOUNTER (EMERGENCY)
Facility: HOSPITAL | Age: 34
Discharge: LEFT BEFORE DISCHARGE | End: 2024-11-18
Payer: MEDICAID

## 2024-11-18 VITALS
WEIGHT: 119 LBS | BODY MASS INDEX: 21.09 KG/M2 | RESPIRATION RATE: 18 BRPM | SYSTOLIC BLOOD PRESSURE: 130 MMHG | TEMPERATURE: 98 F | HEART RATE: 95 BPM | HEIGHT: 63 IN | DIASTOLIC BLOOD PRESSURE: 62 MMHG | OXYGEN SATURATION: 97 %

## 2024-11-18 LAB
ANION GAP SERPL CALC-SCNC: 13 MMOL/L (ref 8–16)
BASOPHILS # BLD AUTO: 0.03 K/UL (ref 0–0.2)
BASOPHILS NFR BLD: 0.3 % (ref 0–1.9)
BLD PROD TYP BPU: NORMAL
BLOOD UNIT EXPIRATION DATE: NORMAL
BLOOD UNIT TYPE CODE: 6200
BLOOD UNIT TYPE: NORMAL
BUN SERPL-MCNC: 31 MG/DL (ref 6–20)
CALCIUM SERPL-MCNC: 9.3 MG/DL (ref 8.7–10.5)
CHLORIDE SERPL-SCNC: 99 MMOL/L (ref 95–110)
CO2 SERPL-SCNC: 27 MMOL/L (ref 23–29)
CODING SYSTEM: NORMAL
CREAT SERPL-MCNC: 4.1 MG/DL (ref 0.5–1.4)
CROSSMATCH INTERPRETATION: NORMAL
DIFFERENTIAL METHOD BLD: ABNORMAL
DISPENSE STATUS: NORMAL
EOSINOPHIL # BLD AUTO: 0.1 K/UL (ref 0–0.5)
EOSINOPHIL NFR BLD: 0.5 % (ref 0–8)
ERYTHROCYTE [DISTWIDTH] IN BLOOD BY AUTOMATED COUNT: 16.3 % (ref 11.5–14.5)
EST. GFR  (NO RACE VARIABLE): 13.9 ML/MIN/1.73 M^2
GLUCOSE SERPL-MCNC: 140 MG/DL (ref 70–110)
HCT VFR BLD AUTO: 31.2 % (ref 37–48.5)
HGB BLD-MCNC: 10.2 G/DL (ref 12–16)
IMM GRANULOCYTES # BLD AUTO: 0.05 K/UL (ref 0–0.04)
IMM GRANULOCYTES NFR BLD AUTO: 0.5 % (ref 0–0.5)
LYMPHOCYTES # BLD AUTO: 0.9 K/UL (ref 1–4.8)
LYMPHOCYTES NFR BLD: 9.1 % (ref 18–48)
MAGNESIUM SERPL-MCNC: 2.1 MG/DL (ref 1.6–2.6)
MCH RBC QN AUTO: 28.1 PG (ref 27–31)
MCHC RBC AUTO-ENTMCNC: 32.7 G/DL (ref 32–36)
MCV RBC AUTO: 86 FL (ref 82–98)
MONOCYTES # BLD AUTO: 0.8 K/UL (ref 0.3–1)
MONOCYTES NFR BLD: 8.4 % (ref 4–15)
NEUTROPHILS # BLD AUTO: 8 K/UL (ref 1.8–7.7)
NEUTROPHILS NFR BLD: 81.2 % (ref 38–73)
NRBC BLD-RTO: 0 /100 WBC
NUM UNITS TRANS PACKED RBC: NORMAL
PHOSPHATE SERPL-MCNC: 4 MG/DL (ref 2.7–4.5)
PLATELET # BLD AUTO: 135 K/UL (ref 150–450)
PMV BLD AUTO: 9.6 FL (ref 9.2–12.9)
POCT GLUCOSE: 192 MG/DL (ref 70–110)
POCT GLUCOSE: 210 MG/DL (ref 70–110)
POTASSIUM SERPL-SCNC: 4 MMOL/L (ref 3.5–5.1)
RBC # BLD AUTO: 3.63 M/UL (ref 4–5.4)
SODIUM SERPL-SCNC: 139 MMOL/L (ref 136–145)
WBC # BLD AUTO: 9.89 K/UL (ref 3.9–12.7)

## 2024-11-18 PROCEDURE — 25000003 PHARM REV CODE 250: Performed by: NURSE PRACTITIONER

## 2024-11-18 PROCEDURE — 96376 TX/PRO/DX INJ SAME DRUG ADON: CPT

## 2024-11-18 PROCEDURE — 80048 BASIC METABOLIC PNL TOTAL CA: CPT | Performed by: STUDENT IN AN ORGANIZED HEALTH CARE EDUCATION/TRAINING PROGRAM

## 2024-11-18 PROCEDURE — 36415 COLL VENOUS BLD VENIPUNCTURE: CPT | Performed by: STUDENT IN AN ORGANIZED HEALTH CARE EDUCATION/TRAINING PROGRAM

## 2024-11-18 PROCEDURE — G0257 UNSCHED DIALYSIS ESRD PT HOS: HCPCS

## 2024-11-18 PROCEDURE — G0378 HOSPITAL OBSERVATION PER HR: HCPCS

## 2024-11-18 PROCEDURE — 84100 ASSAY OF PHOSPHORUS: CPT | Performed by: STUDENT IN AN ORGANIZED HEALTH CARE EDUCATION/TRAINING PROGRAM

## 2024-11-18 PROCEDURE — 94761 N-INVAS EAR/PLS OXIMETRY MLT: CPT

## 2024-11-18 PROCEDURE — 83735 ASSAY OF MAGNESIUM: CPT | Performed by: STUDENT IN AN ORGANIZED HEALTH CARE EDUCATION/TRAINING PROGRAM

## 2024-11-18 PROCEDURE — 96375 TX/PRO/DX INJ NEW DRUG ADDON: CPT

## 2024-11-18 PROCEDURE — 25000003 PHARM REV CODE 250: Performed by: STUDENT IN AN ORGANIZED HEALTH CARE EDUCATION/TRAINING PROGRAM

## 2024-11-18 PROCEDURE — P9016 RBC LEUKOCYTES REDUCED: HCPCS | Performed by: INTERNAL MEDICINE

## 2024-11-18 PROCEDURE — 63600175 PHARM REV CODE 636 W HCPCS: Performed by: INTERNAL MEDICINE

## 2024-11-18 PROCEDURE — 27000221 HC OXYGEN, UP TO 24 HOURS

## 2024-11-18 PROCEDURE — 85025 COMPLETE CBC W/AUTO DIFF WBC: CPT | Performed by: STUDENT IN AN ORGANIZED HEALTH CARE EDUCATION/TRAINING PROGRAM

## 2024-11-18 PROCEDURE — 99900041 HC LEFT WITHOUT BEING SEEN- EMERGENCY

## 2024-11-18 PROCEDURE — 90935 HEMODIALYSIS ONE EVALUATION: CPT

## 2024-11-18 RX ORDER — DIAZEPAM 10 MG/2ML
5 INJECTION INTRAMUSCULAR ONCE
Status: COMPLETED | OUTPATIENT
Start: 2024-11-18 | End: 2024-11-18

## 2024-11-18 RX ORDER — HYDROMORPHONE HYDROCHLORIDE 1 MG/ML
1 INJECTION, SOLUTION INTRAMUSCULAR; INTRAVENOUS; SUBCUTANEOUS ONCE
Status: COMPLETED | OUTPATIENT
Start: 2024-11-18 | End: 2024-11-18

## 2024-11-18 RX ADMIN — PROMETHAZINE HYDROCHLORIDE 25 MG: 25 TABLET ORAL at 01:11

## 2024-11-18 RX ADMIN — HYDROMORPHONE HYDROCHLORIDE 1 MG: 1 INJECTION, SOLUTION INTRAMUSCULAR; INTRAVENOUS; SUBCUTANEOUS at 04:11

## 2024-11-18 RX ADMIN — HYDRALAZINE HYDROCHLORIDE 50 MG: 25 TABLET ORAL at 05:11

## 2024-11-18 RX ADMIN — PANTOPRAZOLE SODIUM 40 MG: 40 TABLET, DELAYED RELEASE ORAL at 05:11

## 2024-11-18 RX ADMIN — HYDROCODONE BITARTRATE AND ACETAMINOPHEN 1 TABLET: 5; 325 TABLET ORAL at 01:11

## 2024-11-18 RX ADMIN — DIAZEPAM 5 MG: 10 INJECTION, SOLUTION INTRAMUSCULAR; INTRAVENOUS at 04:11

## 2024-11-18 RX ADMIN — HYDROCODONE BITARTRATE AND ACETAMINOPHEN 1 TABLET: 5; 325 TABLET ORAL at 03:11

## 2024-11-18 NOTE — PLAN OF CARE
Problem: Hemodialysis  Goal: Safe, Effective Therapy Delivery  Outcome: Met  Goal: Effective Tissue Perfusion  Outcome: Met  Goal: Absence of Infection Signs and Symptoms  Outcome: Met     Problem: Adult Inpatient Plan of Care  Goal: Plan of Care Review  Outcome: Met  Goal: Patient-Specific Goal (Individualized)  Outcome: Met  Goal: Absence of Hospital-Acquired Illness or Injury  Outcome: Met  Goal: Optimal Comfort and Wellbeing  Outcome: Met  Goal: Readiness for Transition of Care  Outcome: Met     Problem: Diabetes Comorbidity  Goal: Blood Glucose Level Within Targeted Range  Outcome: Met     Problem: Fall Injury Risk  Goal: Absence of Fall and Fall-Related Injury  Outcome: Met     Problem: Infection  Goal: Absence of Infection Signs and Symptoms  Outcome: Met

## 2024-11-18 NOTE — RESPIRATORY THERAPY
11/17/24 2020   Patient Assessment/Suction   Level of Consciousness (AVPU) alert   Skin Integrity   $ Wound Care Tech Time 15 min   Area Observed Left;Behind ear;Cheek;Nares   Skin Appearance without discoloration   PRE-TX-O2   Device (Oxygen Therapy) nasal cannula   SpO2 96 %   Pulse Oximetry Type Intermittent   $ Pulse Oximetry - Multiple Charge Pulse Oximetry - Multiple   Education   $ Education Oxygen;15 min

## 2024-11-18 NOTE — NURSING
Upon discharge pt received information regarding hospital stay, when to contact hcp, and future follow-up appointments. Pt verbalized an understanding of all the information discussed, questions/concerns were addressed accordingly. Pt received prn pain and anti-emetic medications prior to discharge. Pt's IV was discontinued per order, catheter tip intact, no signs of bleeding/distress noted. Pt 's father called and informed the pt he was outside waiting was transported off unit via wheelchair. Belongings and discharge information in hand.

## 2024-11-18 NOTE — PROGRESS NOTES
11/18/24 1140   Vital Signs   Temp 98.1 °F (36.7 °C)   Pulse 95   Resp 18   SpO2 97 %   /62   Post-Hemodialysis Assessment   Rinseback Volume (mL) 250 mL   Blood Volume Processed (Liters) 40.9 L   Dialyzer Clearance Lightly streaked   Duration of Treatment 120 minutes   Additional Fluid Intake (mL) 350 mL   Total UF (mL) 2850 mL   Net Fluid Removal 2000   Patient Response to Treatment tolerated well   Post-Treatment Weight 52.1 kg (114 lb 13.8 oz)   Treatment Weight Change -1.9   Arterial bleeding stop time (min) 7 min   Venous bleeding stop time (min) 7 min   Post-Hemodialysis Comments no problems post tx     Pt educated on fluid intake. Report called to Shruthi LINTON

## 2024-11-18 NOTE — DISCHARGE SUMMARY
[facility]  Discharge Summary      Patient Name: Tabby Howard  MRN: [id number]  UNIQUE: [id number]  Patient Class: OP- Observation  Admission Date: 11/17/2024  Hospital Length of Stay: 0 days  Discharge Date and Time:  11/18/2024 11:53 AM  Attending Physician: Kelly Thomas DO   Discharging Provider: JACQUELINE Acevedo  Primary Care Provider: eMlony Kim NP    Primary Care Team: Networked reference to record PCT     HPI:   34 year old female with PMH of ESRD on HD(TTS), HFpEF, h/o SVT, gastritis, gastroparesis, sickle cell trait, insulin dependent type 2 diabetes mellitis presents due to bilateral flank pain which which patient has presented with several times in the past.  All previous work up has been negative and patient has history of asking for opioid medications.  Reports missing last two dialysis sessions (Last HD on Tuesday).  States she was unable to get a ride since she did not have gas money.  Denies abdominal pain, chest pain, dyspnea, swelling, radiation of pain, fevers, numbness/weakness. Patient is anuric.  Bloodwork revealed potassium of 7.1.    ED physician made clear to patient we will try to control pain without opioids and that she will be admitted for emergent dialysis.  Nephrology was consulted - recommended immediate dialysis for severe hyperkalemia.  Patient was given insulin/dextorse, calcium gluconate, albuterol in ED.  Given acetaminophen and lidocaine patch for back pain.      * No surgery found *      Hospital Course:   34 y.o. female patient with Hx ESRD,hypertension, anemia of chronic (renal) disease, sickle cell trait, DMII, and chronic pain admitted after presenting for evaluation of BL flank pain. She was noted to have missed 2 dialysis sessions as well as hyperkalemia and anemia. Nephrology was consulted and patient received emergency dialysis due to hyperkalemia. Nephrology plans to repeat dialysis tomorrow and transfuse one unit of blood  "while on dialysis. Patient tells me that she is unsure of her medications that she takes at home. She points to her pain as posterior hip. XR hips BL ordered. XR hips no acute abnormalities. Patient received dialysis again and was transfused blood. Hgb improved to to 10.2. Patient 's mag and potassium normalized. No episodes of vomiting. Tolerating meals. She does complain of non-specific pain which is noted to be chronic. She will have ambulatory pain management referral placed at discharge. This appears to be the 6th time over the past 6 months that she has been referred. Also important to mention is that the [facility] site notes gabapentin 300 tid prescibed on a regular basis. Patients creatine clearance is Estimated Creatinine Clearance: 15.8 mL/min (A) (based on SCr of 4.1 mg/dL (H)). It is recommended that her gabapentin be weaned to a more suitable dosage to avoid complications in this ESRD patient.     Of note: it has also been previously documented that "The etiology of her chronic abdominal pain is likely secondary to gastroparesis for which the patient would need a gastric emptying study in the outpatient setting. She likely also will need a referral to Pain Management which was placed on discharge (multiple referrals have been placed in the past). She should follow up with the Discharge Clinic. Further inpatient/observation admissions are not likely to provide benefit in working up and treating the chronic pain." Additionally, GI has documented on multiple occasions similar, "Symptomatic management for nausea - Recommend minimize/discontinue narcotics as they are likely worsening the patient's motility which may be the reason for her symptoms. (10/2022)" Has had 6 CT abdomen scans over the past 11 months and one CT renal Stone abd pelvis. Patient today is otherwise stable including stable vitals, awake and alert without any new acute issues. Breathing unlabored and sats stable on room air. Able to " converse in complete sentences. She has been dialyzed 2 times and will return to her usual dialysis schedule. Discharge to home.     Goals of Care Treatment Preferences:  Code Status: Full Code    Health care agent: Step-Mother Tanya Howard / Father Garcia Howard  Health care agent number: [phone] / [phone]                   SDOH Screening:  The patient was screened for food insecurity, housing instability, transportation needs, utility difficulties, and interpersonal safety. The social determinant(s) of health identified as a concern this admission are:  Transportation difficulties    The plan to address these concerns is: Case management has been consulted and has addressed social determinants of health.    Social Drivers of Health with Concerns     Food Insecurity: No Food Insecurity (11/17/2024)   Recent Concern: Food Insecurity - Food Insecurity Present (8/25/2024)   Housing Stability: Low Risk  (11/17/2024)   Recent Concern: Housing Stability - High Risk (10/23/2024)   Transportation Needs: Unmet Transportation Needs (11/17/2024)   Utilities: Not At Risk (11/17/2024)   Recent Concern: Utilities - At Risk (10/23/2024)        Consults:   Consults (From admission, onward)          Status Ordering Provider     Inpatient consult to Nephrology  Once        Provider:  Mando Benitez MD    Completed CHANDA RAYMOND              Final Active Diagnoses:    Diagnosis Date Noted POA    PRINCIPAL PROBLEM:  Fluid volume excess [E87.70] 10/04/2022 Yes    Anemia due to chronic kidney disease, on chronic dialysis [N18.6, D63.1, Z99.2] 10/26/2022 Not Applicable    ESRD (end stage renal disease) [N18.6] 07/22/2022 Yes    Hyperkalemia [E87.5] 12/13/2020 Yes    Bilateral hip pain [M25.551, M25.552] 11/17/2024 Yes    Type 2 diabetes mellitus with hyperglycemia, with long-term current use of insulin, previous HbA1c 5.8% [E11.65, Z79.4] 01/27/2023 Not Applicable    Drug-seeking behavior [Z76.5] 01/24/2023 Yes    Deep  vein thrombosis (DVT) of non-extremity vein [I82.90] 07/26/2022 Yes     Chronic      Problems Resolved During this Admission:       Discharged Condition: stable    Disposition: Home or Self Care    Follow Up:   Follow-up Information       Melony Kim NP In 1 week.    Specialty: Family Medicine  Why: email sent requesting appt  Contact information:  [place] [id number]  [phone]                           Patient Instructions:      Ambulatory referral/consult to Pain Clinic   Standing Status: Future   Referral Priority: Routine Referral Type: Consultation   Referral Reason: Specialty Services Required   Requested Specialty: Pain Medicine   Number of Visits Requested: 1     Diet renal     Activity as tolerated       Significant Diagnostic Studies: Labs: CMP   Recent Labs   Lab 11/17/24  0417 11/17/24  1038 11/17/24  1954 11/18/24  1045    140  --  139   K 6.2* 4.9  --  4.0    99  --  99   CO2 17* 19*  --  27   * 99 131* 140*   * 41*  --  31*   CREATININE 12.4* 6.2*  --  4.1*   CALCIUM 9.7 9.7  --  9.3   ANIONGAP 17* 22*  --  13   , CBC   Recent Labs   Lab 11/17/24  0208 11/17/24  0417 11/17/24  1038 11/18/24  1045   WBC 12.38 11.43  --  9.89   HGB 8.2* 7.4* 7.3* 10.2*   HCT 25.4* 24.1* 22.5* 31.2*   * 113*  --  135*   , INR   Lab Results   Component Value Date    INR 1.0 07/16/2024    INR 1.3 (H) 12/13/2023    INR 0.9 10/03/2023     Recent Labs   Lab 06/23/24  0712 09/10/24  2106   HGBA1C 8.5* 9.0*       Pending Diagnostic Studies:       None           Medications:  Reconciled Home Medications:      Medication List        CONTINUE taking these medications      ALPHAGAN P 0.15 % ophthalmic solution  Generic drug: brimonidine 0.15 % OPTH DROP  Place 1 drop into both eyes 3 (three) times daily.     apixaban 5 mg Tab  Commonly known as: ELIQUIS  Take 1 tablet (5 mg total) by mouth 2 (two) times daily. Patient w/ anemia, so held during admission, follow-up with  "hematologist about restarting     atropine 1% 1 % Drop  Commonly known as: ISOPTO ATROPINE  Place 1 drop into the left eye 2 (two) times a day.     BD ULTRA-FINE MINI PEN NEEDLE 31 gauge x 3/16" Ndle  Generic drug: pen needle, diabetic  Use as directed with insulin once daily     blood-glucose meter Misc  Commonly known as: TRUE METRIX GLUCOSE METER  Use to check blood glucose     blood-glucose meter,continuous Misc  USE WITH SENSORS     brinzolamide 1 % ophthalmic suspension  Commonly known as: AZOPT  Place1 drop in left eye 3 times a day for 30 days     busPIRone 10 MG tablet  Commonly known as: BUSPAR  Take 1 tablet (10 mg total) by mouth 3 (three) times daily as needed (anxiety).     cetirizine 10 MG tablet  Commonly known as: ZYRTEC  Take 1 tablet every day by oral route.     * DEXCOM G7 SENSOR Heather  Generic drug: blood-glucose sensor  CHANGE EVERY 10 DAYS     * DEXCOM G7 SENSOR Heather  Generic drug: blood-glucose sensor  Use as directed for CGM. Change sensor every 10 days     dorzolamide-timolol 2-0.5% 22.3-6.8 mg/mL ophthalmic solution  Commonly known as: COSOPT  place 1 drop in left eye twice a day  for 30 days     fluticasone propionate 50 mcg/actuation nasal spray  Commonly known as: FLONASE ALLERGY RELIEF  Spray 1 spray every day by intranasal route.     gabapentin 300 MG capsule  Commonly known as: NEURONTIN  Take 2 capsules twice a day by oral route for 90 days.     hydrALAZINE 50 MG tablet  Commonly known as: APRESOLINE  Take 1 tablet (50 mg total) by mouth every 8 (eight) hours.     HYDROcodone-acetaminophen 5-325 mg per tablet  Commonly known as: NORCO  Take 1 tablet by mouth every 4 (four) hours as needed for Pain (flank pain).     insulin aspart U-100 100 unit/mL (3 mL) Inpn pen  Commonly known as: NovoLOG  Inject 8 Units into the skin 3 (three) times daily with meals.     levETIRAcetam 500 MG Tab  Commonly known as: KEPPRA  Take 1 tablet (500 mg total) by mouth 2 (two) times a day.     pantoprazole " 40 MG tablet  Commonly known as: PROTONIX  Take 1 tablet (40 mg total) by mouth once daily.     prednisoLONE acetate 1 % Drps  Commonly known as: PRED FORTE  Place 1 drop into the left eye 2 (two) times daily.     RENVELA 800 mg Tab  Generic drug: sevelamer carbonate  Take 1 tablet (800 mg total) by mouth 3 (three) times daily with meals.     sodium bicarbonate 650 MG tablet  Take 1 tablet (650 mg total) by mouth 3 (three) times daily.     sucralfate 1 gram tablet  Commonly known as: CARAFATE  Take 1 tablet (1 g total) by mouth 4 (four) times daily.     timolol maleate 0.5% 0.5 % Drop  Commonly known as: TIMOPTIC  Place 1 drop in left eye 2 times a day for 30 days     TRUE METRIX GLUCOSE TEST STRIP Strp  Generic drug: blood sugar diagnostic  Use 1 strip to check blood glucose three times daily     TRUEPLUS LANCETS 33 gauge Misc  Generic drug: lancets  Use 1 to check blood glucose three times daily           * This list has 2 medication(s) that are the same as other medications prescribed for you. Read the directions carefully, and ask your doctor or other care provider to review them with you.                  Indwelling Lines/Drains at time of discharge:   Lines/Drains/Airways       Drain  Duration                  Hemodialysis AV Fistula Left upper arm -- days                    Time spent on the discharge of patient: 35 minutes             Zee Padron, BRADEN, APRN, FNP-C  [facility]  [facility]  [email or web address]

## 2024-11-18 NOTE — PLAN OF CARE
Email sent to [employer] [facility] requesting pcp hospital follow up  Spoke to pt's step mom- she said pt's dad will pick her up after work today but could not give me an exact time.    Pt is clear for dc from CM       11/18/24 1218   Final Note   Assessment Type Final Discharge Note   Anticipated Discharge Disposition Home   Hospital Resources/Appts/Education Provided Community resources provided

## 2024-11-18 NOTE — PROGRESS NOTES
Consult Note  Nephrology    Consult Requested By: Kelly Thomas DO    Reason for Consult: ESRD, dependent on dialysis    SUBJECTIVE:     History of Present Illness:   35 y/o female patient known to our practice, has out patient dialysis 3x wk on TTS schedule.  She presented to ER due to bilateral flank pain which which patient has presented with several times in the past. Missed the last 2 HD treatments.  K+7.1.  nephrology is consulted for urgent dialysis.      VSS, K+ down to 6.2 after temporizing measures, now s/p HD.  C/o back pain, crying and squirming around.   VSS. HD today to give blood. Can be dc after HD if stable. Next HD Tue - either inpatient or outpatient.    Assessment/plan:    ESRD on HD TTS  Anemia of chronic dz + acute anemia requiring tnransfusion  Hyperkalemia  Metabolic acidosis  Secondary HPT  DM    --HD on TTS per pt's schedule. Dose all medications for dialysis  --HD today for blood transfusion and additional clearance, can dc after  HD  --increased bicarb in dialysate  --Hgb low, give blood today, keep Epo  --continue sevelemer w/meals as tolerated, renal diet  --blood glucose control per primary team.    Past Medical History:   Diagnosis Date    ESRD on hemodialysis 2022    Gastritis 2022    EGD was 21    Gastroparesis 2022    has not had Emptying study    Heart failure with preserved ejection fraction 2022    EF 55% on 3/22    History of supraventricular tachycardia     Hyperkalemia 2022    Hypertensive emergency 2022    Sickle cell trait 2022    Type 2 diabetes mellitus      Past Surgical History:   Procedure Laterality Date     SECTION      x 3    COLONOSCOPY      COLONOSCOPY N/A 2022    Procedure: COLONOSCOPY;  Surgeon: Jagdeep Cedeno MD;  Location: [facility];  Service: Endoscopy;  Laterality: N/A;    DESTRUCTION, CILIARY BODY, USING LASER Left 2024    Procedure: Cyclophotocoagulation G-probe, retrobulbar  chlorpromazine left eye;  Surgeon: Fidel Wise MD;  Location: [address];  Service: Ophthalmology;  Laterality: Left;    ENDOSCOPIC ULTRASOUND OF UPPER GASTROINTESTINAL TRACT N/A 12/16/2023    Procedure: ULTRASOUND, UPPER GI TRACT, ENDOSCOPIC;  Surgeon: Micky Paredes III, MD;  Location: [facility];  Service: Endoscopy;  Laterality: N/A;    ESOPHAGOGASTRODUODENOSCOPY N/A 10/18/2019    Procedure: ESOPHAGOGASTRODUODENOSCOPY (EGD);  Surgeon: Gianluca Mendez MD;  Location: [facility];  Service: Endoscopy;  Laterality: N/A;    ESOPHAGOGASTRODUODENOSCOPY N/A 08/24/2022    Procedure: EGD (ESOPHAGOGASTRODUODENOSCOPY);  Surgeon: Micky Paredes III, MD;  Location: [facility];  Service: Endoscopy;  Laterality: N/A;    ESOPHAGOGASTRODUODENOSCOPY N/A 12/05/2022    Procedure: EGD (ESOPHAGOGASTRODUODENOSCOPY);  Surgeon: Marcelo Zhong MD;  Location: [facility];  Service: Endoscopy;  Laterality: N/A;    ESOPHAGOGASTRODUODENOSCOPY N/A 9/13/2024    Procedure: EGD (ESOPHAGOGASTRODUODENOSCOPY);  Surgeon: Marcelo Zhong MD;  Location: [facility];  Service: Endoscopy;  Laterality: N/A;    EYE SURGERY      INSERTION, CATHETER, TUNNELED N/A 06/17/2023    Procedure: Insertion,catheter,tunneled;  Surgeon: Carlos Thurman Jr., MD;  [facility];  Service: General;  Laterality: N/A;    LAPAROSCOPIC CHOLECYSTECTOMY N/A 07/30/2022    Procedure: CHOLECYSTECTOMY, LAPAROSCOPIC;  Surgeon: Grey Perez MD;  Location: [facility];  Service: General;  Laterality: N/A;    PLACEMENT OF DUAL-LUMEN VASCULAR CATHETER Left 07/12/2022    Procedure: INSERTION-CATHETER-JOSEPH;  Surgeon: Dionte Gan MD;  [facility];  Service: General;  Laterality: Left;    PLACEMENT OF DUAL-LUMEN VASCULAR CATHETER Right 07/26/2022    Procedure: INSERTION-CATHETER-Hemosplit;  Surgeon: Dionte Gan MD;  [facility];  Service: General;  Laterality: Right;     Family History   Problem Relation Name Age of Onset    Diabetes Mother  "     Diabetes Father       Social History     Tobacco Use    Smoking status: Never    Smokeless tobacco: Never   Substance Use Topics    Alcohol use: Not Currently    Drug use: No       Review of patient's allergies indicates:   Allergen Reactions    Droperidol Hives    Haldol [haloperidol lactate] Hives    Penicillins Hives    Droperidol (bulk) Anxiety     STATES "FREAKS OUT".  TOLERATES HALDOL PRIOR TO ARRIVAL AT ANOTHER FACILITY TODAY.         Review of Systems:  As above    OBJECTIVE:     Vital Signs Range (Last 24H):  Temp:  [97.7 °F (36.5 °C)-98.8 °F (37.1 °C)]   Pulse:  []   Resp:  [16-22]   BP: (123-168)/(79-96)   SpO2:  [92 %-97 %]     Physical Exam:  General- NAD noted  HEENT- WNL  Neck- supple  CV- Regular rate and rhythm  Resp-  No increased WOB  GI- Non tender/non-distended  Extrem- No cyanosis, clubbing, edema.  Derm- skin w/d  Neuro-  No flap.     Body mass index is 21.08 kg/m².    Laboratory:  Recent Labs   Lab 11/17/24 0208 11/17/24 0417 11/17/24  1038 11/17/24 1954    141 140  --    K 7.1* 6.2* 4.9  --     107 99  --    CO2 17* 17* 19*  --    BUN 99* 100* 41*  --    CREATININE 12.1* 12.4* 6.2*  --    * 130* 99 131*       Recent Labs   Lab 11/17/24  0208 11/17/24 0417 11/17/24  1038   CALCIUM 9.5 9.7 9.7   PHOS  --  7.9*  --    MG  --  2.6  --        Recent Labs   Lab 07/08/22  0516   PTH, Intact 289.8 H       Recent Labs   Lab 11/17/24  0302 11/17/24  1147 11/17/24  1601 11/17/24  1917 11/17/24  1927 11/17/24 2014 11/18/24  0048 11/18/24  0726   POCTGLUCOSE 189* 135* 186* 23* 88 122* 210* 192*       Recent Labs   Lab 08/01/23  0813 06/23/24  0712 09/10/24  2106   Hemoglobin A1C 5.8 8.5 H 9.0 H       Recent Labs   Lab 11/17/24  0208 11/17/24  0417 11/17/24  1038   WBC 12.38 11.43  --    HGB 8.2* 7.4* 7.3*   HCT 25.4* 24.1* 22.5*   * 113*  --    MCV 89 90  --    MCHC 32.3 30.7*  --    MONO 8.7  1.1* 9.3  1.1*  --    EOSINOPHIL 2.3 1.0  --        No results for " "input(s): "BILITOT", "BILIDIR", "PROT", "ALBUMIN", "ALKPHOS", "ALT", "AST" in the last 168 hours.    Recent Labs   Lab 03/16/22  1848 05/15/22  2022 10/20/24  1159 11/07/24  1458 11/10/24  1124   Color, UA Pale Yellow   < > Orange A Yellow Yellow   Appearance, UA  --    < > Cloudy A Clear Hazy A   pH, UA  --    < > 8.0 8.0 >8.0 A   Specific [place], UA  --    < > 1.010 1.010 1.010   Protein, UA  --    < > 3+ A 3+ A 3+ A   Glucose, UA 50 mg/dL A   < > Negative 4+ A 3+ A   Ketones, UA  --    < > Negative Negative Negative   Urobilinogen, UA Normal   < > Negative Negative Negative   Bilirubin (UA) Negative   < > Negative Negative Negative   Occult Blood UA  --    < > 1+ A Negative 1+ A   Blood, UA 0.03 mg/dL A   < >  --   --   --    Nitrite, UA  --    < > Negative Negative Negative   RBC, UA  --    < > 25 H 0 5 H   WBC, UA  --    < > >100 H 2 35 H   Bacteria  --    < > Many A None Rare   Mucus, UA Rare A  --   --   --   --    Hyaline Casts, UA  --    < > 0 0 0    < > = values in this interval not displayed.       Recent Labs   Lab 09/19/24  1448 10/23/24  2356 11/07/24  1524   POC PH 7.366 7.399  7.399 7.422   POC PCO2 48.8 H 37.7  37.7 49.8 H   POC HCO3 27.9 23.3 L  23.3 L 32.4 H   POC PO2 35 LL 51  51 40   POC SATURATED O2 64 86  86 75   POC BE 3 H -2  -2 8 H   Sample VENOUS CAPILLARY  CAPILLARY VENOUS       Microbiology Results (last 7 days)       ** No results found for the last 168 hours. **          ASSESSMENT/PLAN:     Active Hospital Problems    Diagnosis  POA    *Fluid volume excess [E87.70]  Yes    Bilateral hip pain [M25.551, M25.552]  Yes    Type 2 diabetes mellitus with hyperglycemia, with long-term current use of insulin, previous HbA1c 5.8% [E11.65, Z79.4]  Not Applicable    Drug-seeking behavior [Z76.5]  Yes    Anemia due to chronic kidney disease, on chronic dialysis [N18.6, D63.1, Z99.2]  Not Applicable    Deep vein thrombosis (DVT) of non-extremity vein [I82.90]  Yes     Chronic    ESRD (end " stage renal disease) [N18.6]  Yes    Hyperkalemia [E87.5]  Yes      Resolved Hospital Problems   No resolved problems to display.     Thank you for allowing us to participate in the care of your patient. We will follow the patient and provide recommendations as needed.    Time spent seeing patient( greater than 1/2 spent in direct contact) :     Mando Benitez MD

## 2024-11-19 ENCOUNTER — TELEPHONE (OUTPATIENT)
Facility: CLINIC | Age: 34
End: 2024-11-19
Payer: MEDICAID

## 2024-11-19 ENCOUNTER — TELEPHONE (OUTPATIENT)
Dept: SURGERY | Facility: CLINIC | Age: 34
End: 2024-11-19
Payer: MEDICAID

## 2024-11-19 NOTE — TELEPHONE ENCOUNTER
Left voicemail for the patient to call back [phone] to reschedule her canceled new pt appt with hematology that was scheduled while she was admitted.

## 2024-11-20 ENCOUNTER — TELEPHONE (OUTPATIENT)
Facility: CLINIC | Age: 34
End: 2024-11-20
Payer: MEDICAID

## 2024-11-23 LAB
OHS QRS DURATION: 90 MS
OHS QTC CALCULATION: 507 MS

## 2024-11-29 NOTE — ASSESSMENT & PLAN NOTE
Discharge home with outpatient follow-up after RRT today; continue preadmission regimen as listed below in discharge med rec; Renal dietHold Oral hypoglycemics while patient is in the hospital.   NOTIFICATION OF ADMISSION DISCHARGE   This is a Notification of Discharge from [facility]. Please be advised that this patient has been discharge from our facility. Below you will find the admission and discharge date and time including the patient’s disposition.   UTILIZATION REVIEW CONTACT:  Judie Norris  Utilization   Network Utilization Review Department  Phone: [phone] [facility] Acute Rehab    Network [phone]   DISCHARGE SUPPORT TEAM ([facility]) [phone]   PARENT CHILD HEALTH (Maternity/NICU/Pediatrics) [phone]   [facility] [phone]   [facility] [phone]   [facility] [phone]   [facility] [phone]   [facility] [phone]   [facility] [phone]   [facility] [phone]   [facility]  [phone]   [facility] [phone]   [facility] [phone]   [facility] [phone]   [facility] [phone]

## 2024-12-01 ENCOUNTER — HOSPITAL ENCOUNTER (EMERGENCY)
Facility: HOSPITAL | Age: 34
Discharge: HOME | End: 2024-12-01
Attending: STUDENT IN AN ORGANIZED HEALTH CARE EDUCATION/TRAINING PROGRAM
Payer: MEDICAID

## 2024-12-01 VITALS
RESPIRATION RATE: 20 BRPM | BODY MASS INDEX: 20.73 KG/M2 | WEIGHT: 117 LBS | DIASTOLIC BLOOD PRESSURE: 105 MMHG | HEIGHT: 63 IN | SYSTOLIC BLOOD PRESSURE: 184 MMHG | HEART RATE: 92 BPM | OXYGEN SATURATION: 100 % | TEMPERATURE: 99 F

## 2024-12-01 DIAGNOSIS — M54.9 BACK PAIN: Primary | ICD-10-CM

## 2024-12-01 LAB
ALBUMIN SERPL BCP-MCNC: 3.5 G/DL (ref 3.5–5.2)
ALP SERPL-CCNC: 216 U/L (ref 40–150)
ALT SERPL W/O P-5'-P-CCNC: 27 U/L (ref 10–44)
ANION GAP SERPL CALC-SCNC: 23 MMOL/L (ref 8–16)
AST SERPL-CCNC: 33 U/L (ref 10–40)
BASOPHILS # BLD AUTO: 0.05 K/UL (ref 0–0.2)
BASOPHILS NFR BLD: 0.7 % (ref 0–1.9)
BILIRUB SERPL-MCNC: 1.7 MG/DL (ref 0.1–1)
BUN SERPL-MCNC: 27 MG/DL (ref 6–20)
CALCIUM SERPL-MCNC: 10.6 MG/DL (ref 8.7–10.5)
CHLORIDE SERPL-SCNC: 93 MMOL/L (ref 95–110)
CO2 SERPL-SCNC: 21 MMOL/L (ref 23–29)
CREAT SERPL-MCNC: 5.3 MG/DL (ref 0.5–1.4)
DIFFERENTIAL METHOD BLD: ABNORMAL
EOSINOPHIL # BLD AUTO: 0.1 K/UL (ref 0–0.5)
EOSINOPHIL NFR BLD: 1.5 % (ref 0–8)
ERYTHROCYTE [DISTWIDTH] IN BLOOD BY AUTOMATED COUNT: 15 % (ref 11.5–14.5)
EST. GFR  (NO RACE VARIABLE): 10.2 ML/MIN/1.73 M^2
GLUCOSE SERPL-MCNC: 155 MG/DL (ref 70–110)
HCT VFR BLD AUTO: 27.1 % (ref 37–48.5)
HGB BLD-MCNC: 9.1 G/DL (ref 12–16)
IMM GRANULOCYTES # BLD AUTO: 0.02 K/UL (ref 0–0.04)
IMM GRANULOCYTES NFR BLD AUTO: 0.3 % (ref 0–0.5)
LYMPHOCYTES # BLD AUTO: 0.7 K/UL (ref 1–4.8)
LYMPHOCYTES NFR BLD: 10.3 % (ref 18–48)
MCH RBC QN AUTO: 28.8 PG (ref 27–31)
MCHC RBC AUTO-ENTMCNC: 33.6 G/DL (ref 32–36)
MCV RBC AUTO: 86 FL (ref 82–98)
MONOCYTES # BLD AUTO: 0.8 K/UL (ref 0.3–1)
MONOCYTES NFR BLD: 10.6 % (ref 4–15)
NEUTROPHILS # BLD AUTO: 5.5 K/UL (ref 1.8–7.7)
NEUTROPHILS NFR BLD: 76.6 % (ref 38–73)
NRBC BLD-RTO: 0 /100 WBC
PLATELET # BLD AUTO: 141 K/UL (ref 150–450)
PMV BLD AUTO: 10 FL (ref 9.2–12.9)
POTASSIUM SERPL-SCNC: 4.5 MMOL/L (ref 3.5–5.1)
PROT SERPL-MCNC: 8.3 G/DL (ref 6–8.4)
RBC # BLD AUTO: 3.16 M/UL (ref 4–5.4)
SODIUM SERPL-SCNC: 137 MMOL/L (ref 136–145)
WBC # BLD AUTO: 7.19 K/UL (ref 3.9–12.7)

## 2024-12-01 PROCEDURE — 99285 EMERGENCY DEPT VISIT HI MDM: CPT | Mod: 25

## 2024-12-01 PROCEDURE — 71045 X-RAY EXAM CHEST 1 VIEW: CPT | Mod: 26,,, | Performed by: STUDENT IN AN ORGANIZED HEALTH CARE EDUCATION/TRAINING PROGRAM

## 2024-12-01 PROCEDURE — 25000003 PHARM REV CODE 250: Performed by: STUDENT IN AN ORGANIZED HEALTH CARE EDUCATION/TRAINING PROGRAM

## 2024-12-01 PROCEDURE — 85025 COMPLETE CBC W/AUTO DIFF WBC: CPT | Performed by: STUDENT IN AN ORGANIZED HEALTH CARE EDUCATION/TRAINING PROGRAM

## 2024-12-01 PROCEDURE — 80053 COMPREHEN METABOLIC PANEL: CPT | Performed by: STUDENT IN AN ORGANIZED HEALTH CARE EDUCATION/TRAINING PROGRAM

## 2024-12-01 PROCEDURE — 71045 X-RAY EXAM CHEST 1 VIEW: CPT | Mod: TC

## 2024-12-01 RX ORDER — ACETAMINOPHEN 500 MG
1000 TABLET ORAL
Status: COMPLETED | OUTPATIENT
Start: 2024-12-01 | End: 2024-12-01

## 2024-12-01 RX ADMIN — ACETAMINOPHEN 1000 MG: 500 TABLET ORAL at 08:12

## 2024-12-01 NOTE — ED PROVIDER NOTES
"Encounter Date: 2024       History     Chief Complaint   Patient presents with    Back Pain     Pt. C/o severe back pain. Pt. Is a dialysis pt.      34-year-old female with significant history of IDDM, ESRD on HD //Sat, (completed dialysis yesterday) gastroparesis, diastolic CHF, chronic back/flank pain presenting with chief complaints of right flank pain with onset this morning. She denies exacerbating or remitting factors. She has not taken anything for the pain. Denies associated chest pain, shortness of breath, fever, chills, nausea, vomiting, recent changes in bowel habits.    The history is provided by the patient. No  was used.     Review of patient's allergies indicates:   Allergen Reactions    Droperidol Hives    Haldol [haloperidol lactate] Hives    Penicillins Hives    Droperidol (bulk) Anxiety     STATES "FREAKS OUT".  TOLERATES HALDOL PRIOR TO ARRIVAL AT ANOTHER FACILITY TODAY.      Past Medical History:   Diagnosis Date    ESRD on hemodialysis 2022    Gastritis 2022    EGD was 22    Gastroparesis 2022    has not had Emptying study    Heart failure with preserved ejection fraction 2022    EF 55% on 3/22    History of supraventricular tachycardia     Hyperkalemia 2022    Hypertensive emergency 2022    Sickle cell trait 2022    Type 2 diabetes mellitus      Past Surgical History:   Procedure Laterality Date     SECTION      x 3    COLONOSCOPY      COLONOSCOPY N/A 2022    Procedure: COLONOSCOPY;  Surgeon: Jagdeep Cedeno MD;  Location: [facility];  Service: Endoscopy;  Laterality: N/A;    DESTRUCTION, CILIARY BODY, USING LASER Left 2024    Procedure: Cyclophotocoagulation G-probe, retrobulbar chlorpromazine left eye;  Surgeon: Fidel Wise MD;  Location: [address];  Service: Ophthalmology;  Laterality: Left;    ENDOSCOPIC ULTRASOUND OF UPPER GASTROINTESTINAL TRACT N/A 2023    Procedure: ULTRASOUND, " UPPER GI TRACT, ENDOSCOPIC;  Surgeon: Micky Paredes III, MD;  Location: [facility] ENDO;  Service: Endoscopy;  Laterality: N/A;    ESOPHAGOGASTRODUODENOSCOPY N/A 10/18/2019    Procedure: ESOPHAGOGASTRODUODENOSCOPY (EGD);  Surgeon: Gianluca Mendez MD;  Location: [facility];  Service: Endoscopy;  Laterality: N/A;    ESOPHAGOGASTRODUODENOSCOPY N/A 08/24/2022    Procedure: EGD (ESOPHAGOGASTRODUODENOSCOPY);  Surgeon: Micky Paredes III, MD;  Location: [facility];  Service: Endoscopy;  Laterality: N/A;    ESOPHAGOGASTRODUODENOSCOPY N/A 12/05/2022    Procedure: EGD (ESOPHAGOGASTRODUODENOSCOPY);  Surgeon: Marcelo Zhong MD;  Location: [facility];  Service: Endoscopy;  Laterality: N/A;    ESOPHAGOGASTRODUODENOSCOPY N/A 9/13/2024    Procedure: EGD (ESOPHAGOGASTRODUODENOSCOPY);  Surgeon: Marcelo Zhong MD;  Location: [facility];  Service: Endoscopy;  Laterality: N/A;    EYE SURGERY      INSERTION, CATHETER, TUNNELED N/A 06/17/2023    Procedure: Insertion,catheter,tunneled;  Surgeon: Carlos Thurman Jr., MD;  [facility]  Service: General;  Laterality: N/A;    LAPAROSCOPIC CHOLECYSTECTOMY N/A 07/30/2022    Procedure: CHOLECYSTECTOMY, LAPAROSCOPIC;  Surgeon: Grey Perez MD;  Location: [facility] OR;  Service: General;  Laterality: N/A;    PLACEMENT OF DUAL-LUMEN VASCULAR CATHETER Left 07/12/2022    Procedure: INSERTION-CATHETER-JOSEPH;  Surgeon: Dionte Gan MD;  Location: [facility] OR;  Service: General;  Laterality: Left;    PLACEMENT OF DUAL-LUMEN VASCULAR CATHETER Right 07/26/2022    Procedure: INSERTION-CATHETER-Hemosplit;  Surgeon: Dionte Gan MD;  Location: [facility] OR;  Service: General;  Laterality: Right;     Family History   Problem Relation Name Age of Onset    Diabetes Mother      Diabetes Father       Social History     Tobacco Use    Smoking status: Never    Smokeless tobacco: Never   Substance Use Topics    Alcohol use: Not Currently    Drug use: No     Review of Systems   Constitutional:  Negative.    HENT: Negative.     Eyes: Negative.    Respiratory: Negative.     Cardiovascular: Negative.    Gastrointestinal: Negative.    Endocrine: Negative.    Genitourinary: Negative.    Musculoskeletal:  Positive for back pain.   Skin: Negative.    Neurological: Negative.    Hematological: Negative.    Psychiatric/Behavioral: Negative.     All other systems reviewed and are negative.      Physical Exam     Initial Vitals [12/01/24 0826]   BP Pulse Resp Temp SpO2   (!) 190/84 104 20 98.9 °F (37.2 °C) 95 %      MAP       --         Physical Exam    Nursing note and vitals reviewed.  Constitutional: She appears well-developed.   HENT:   Head: Normocephalic.   Eyes: Pupils are equal, round, and reactive to light.   Neck: No JVD present.   Normal range of motion.  Cardiovascular:  Normal rate.           Pulmonary/Chest: Breath sounds normal. No respiratory distress. She has no wheezes. She has no rales.   Abdominal: Abdomen is soft. Bowel sounds are normal. She exhibits no distension and no mass. There is no abdominal tenderness. There is no rebound and no guarding.   Musculoskeletal:         General: Normal range of motion.      Cervical back: Normal range of motion.     Neurological: She is alert and oriented to person, place, and time. She has normal strength. GCS score is 15. GCS eye subscore is 4. GCS verbal subscore is 5. GCS motor subscore is 6.   Skin: Skin is warm. Capillary refill takes less than 2 seconds.   Psychiatric: She has a normal mood and affect.         ED Course   Procedures  Labs Reviewed   CBC W/ AUTO DIFFERENTIAL - Abnormal       Result Value    WBC 7.19      RBC 3.16 (*)     Hemoglobin 9.1 (*)     Hematocrit 27.1 (*)     MCV 86      MCH 28.8      MCHC 33.6      RDW 15.0 (*)     Platelets 141 (*)     MPV 10.0      Immature Granulocytes 0.3      Gran # (ANC) 5.5      Immature Grans (Abs) 0.02      Lymph # 0.7 (*)     Mono # 0.8      Eos # 0.1      Baso # 0.05      nRBC 0      Gran % 76.6 (*)      Lymph % 10.3 (*)     Mono % 10.6      Eosinophil % 1.5      Basophil % 0.7      Differential Method Automated     COMPREHENSIVE METABOLIC PANEL - Abnormal    Sodium 137      Potassium 4.5      Chloride 93 (*)     CO2 21 (*)     Glucose 155 (*)     BUN 27 (*)     Creatinine 5.3 (*)     Calcium 10.6 (*)     Total Protein 8.3      Albumin 3.5      Total Bilirubin 1.7 (*)     Alkaline Phosphatase 216 (*)     AST 33      ALT 27      eGFR 10.2 (*)     Anion Gap 23 (*)           Imaging Results              X-Ray Chest AP Portable (Final result)  Result time 12/01/24 09:07:31      Final result by Maik Marques MD (12/01/24 09:07:31)                   Impression:      As above.      Electronically signed by: Maik Marques  Date:    12/01/2024  Time:    09:07               Narrative:    EXAMINATION:  XR CHEST AP PORTABLE    CLINICAL HISTORY:  Dorsalgia, unspecified    TECHNIQUE:  Single frontal view of the chest was performed.    COMPARISON:  11/17/2024, 09/17/2024    FINDINGS:  Continued enlarged cardiac silhouette.  No airspace consolidation, pleural effusion, or pneumothorax.  Osseous structures appear intact.                                       Medications   acetaminophen tablet 1,000 mg (1,000 mg Oral Given 12/1/24 0840)     Medical Decision Making  34-year-old female with significant history of IDDM, ESRD on HD Tu/Th/Sat, (completed dialysis yesterday) gastroparesis, diastolic CHF, chronic back/flank pain presenting with chief complaints of right flank pain with onset this morning. She denies exacerbating or remitting factors. She has not taken anything for the pain. Denies associated chest pain, shortness of breath, fever, chills, nausea, vomiting, recent changes in bowel habits.  ------------------  EKG reveals sinus rhythm with prolonged Qtc 509, otherwise unremarkable EKG  Chest x-ray shows stable cardiomegaly, no pleural effusion, no pulmonary edema  Screening labs including CBC, CMP shows findings  similar to patient's baseline  This appears to be chronic pain syndrome, patient is advised to follow up with pain management, PCP and was given strict return precautions to the ED. She was very disruptive, screaming at staff.     Amount and/or Complexity of Data Reviewed  Labs: ordered.  Radiology: ordered.    Risk  OTC drugs.                                      Clinical Impression:  Final diagnoses:  [M54.9] Back pain (Primary)          ED Disposition Condition    Discharge Stable          ED Prescriptions    None       Follow-up Information       Follow up With Specialties Details Why Contact Info    Melony Kim, NP Family Medicine  As needed [address] [place] [id number]  [phone]               Luis Alfredo Fournier MD  12/01/24 3165

## 2024-12-02 LAB
OHS QRS DURATION: 88 MS
OHS QTC CALCULATION: 509 MS

## 2024-12-05 ENCOUNTER — HOSPITAL ENCOUNTER (OUTPATIENT)
Facility: HOSPITAL | Age: 34
Discharge: HOME | End: 2024-12-06
Attending: EMERGENCY MEDICINE | Admitting: STUDENT IN AN ORGANIZED HEALTH CARE EDUCATION/TRAINING PROGRAM
Payer: MEDICAID

## 2024-12-05 DIAGNOSIS — Z99.2 ESRD (END STAGE RENAL DISEASE) ON DIALYSIS: ICD-10-CM

## 2024-12-05 DIAGNOSIS — E87.6 HYPOKALEMIA: Primary | ICD-10-CM

## 2024-12-05 DIAGNOSIS — N18.6 ESRD (END STAGE RENAL DISEASE) ON DIALYSIS: ICD-10-CM

## 2024-12-05 DIAGNOSIS — Z76.5 DRUG-SEEKING BEHAVIOR: ICD-10-CM

## 2024-12-05 LAB
ABO + RH BLD: NORMAL
ALBUMIN SERPL BCP-MCNC: 3.5 G/DL (ref 3.5–5.2)
ALP SERPL-CCNC: 163 U/L (ref 40–150)
ALT SERPL W/O P-5'-P-CCNC: 19 U/L (ref 10–44)
ANION GAP SERPL CALC-SCNC: 13 MMOL/L (ref 8–16)
AST SERPL-CCNC: 19 U/L (ref 10–40)
BASOPHILS # BLD AUTO: 0.04 K/UL (ref 0–0.2)
BASOPHILS NFR BLD: 0.7 % (ref 0–1.9)
BILIRUB SERPL-MCNC: 0.8 MG/DL (ref 0.1–1)
BLD GP AB SCN CELLS X3 SERPL QL: NORMAL
BUN SERPL-MCNC: 10 MG/DL (ref 6–20)
CALCIUM SERPL-MCNC: 9.1 MG/DL (ref 8.7–10.5)
CHLORIDE SERPL-SCNC: 95 MMOL/L (ref 95–110)
CO2 SERPL-SCNC: 29 MMOL/L (ref 23–29)
CREAT SERPL-MCNC: 2.7 MG/DL (ref 0.5–1.4)
DIFFERENTIAL METHOD BLD: ABNORMAL
EOSINOPHIL # BLD AUTO: 0.2 K/UL (ref 0–0.5)
EOSINOPHIL NFR BLD: 2.9 % (ref 0–8)
ERYTHROCYTE [DISTWIDTH] IN BLOOD BY AUTOMATED COUNT: 16.8 % (ref 11.5–14.5)
EST. GFR  (NO RACE VARIABLE): 23 ML/MIN/1.73 M^2
GLUCOSE SERPL-MCNC: 178 MG/DL (ref 70–110)
HCG INTACT+B SERPL-ACNC: <1.2 MIU/ML
HCT VFR BLD AUTO: 24.8 % (ref 37–48.5)
HGB BLD-MCNC: 8.2 G/DL (ref 12–16)
IMM GRANULOCYTES # BLD AUTO: 0.02 K/UL (ref 0–0.04)
IMM GRANULOCYTES NFR BLD AUTO: 0.4 % (ref 0–0.5)
LIPASE SERPL-CCNC: 23 U/L (ref 4–60)
LYMPHOCYTES # BLD AUTO: 0.8 K/UL (ref 1–4.8)
LYMPHOCYTES NFR BLD: 13.9 % (ref 18–48)
MAGNESIUM SERPL-MCNC: 1.8 MG/DL (ref 1.6–2.6)
MCH RBC QN AUTO: 29.4 PG (ref 27–31)
MCHC RBC AUTO-ENTMCNC: 33.1 G/DL (ref 32–36)
MCV RBC AUTO: 89 FL (ref 82–98)
MONOCYTES # BLD AUTO: 0.5 K/UL (ref 0.3–1)
MONOCYTES NFR BLD: 8.9 % (ref 4–15)
NEUTROPHILS # BLD AUTO: 4.1 K/UL (ref 1.8–7.7)
NEUTROPHILS NFR BLD: 73.2 % (ref 38–73)
NRBC BLD-RTO: 0 /100 WBC
PLATELET # BLD AUTO: 176 K/UL (ref 150–450)
PMV BLD AUTO: 10.1 FL (ref 9.2–12.9)
POTASSIUM SERPL-SCNC: 2.8 MMOL/L (ref 3.5–5.1)
PROT SERPL-MCNC: 7.6 G/DL (ref 6–8.4)
RBC # BLD AUTO: 2.79 M/UL (ref 4–5.4)
SODIUM SERPL-SCNC: 137 MMOL/L (ref 136–145)
SPECIMEN OUTDATE: NORMAL
WBC # BLD AUTO: 5.61 K/UL (ref 3.9–12.7)

## 2024-12-05 PROCEDURE — 86900 BLOOD TYPING SEROLOGIC ABO: CPT | Performed by: NURSE PRACTITIONER

## 2024-12-05 PROCEDURE — 96375 TX/PRO/DX INJ NEW DRUG ADDON: CPT

## 2024-12-05 PROCEDURE — 80053 COMPREHEN METABOLIC PANEL: CPT | Performed by: NURSE PRACTITIONER

## 2024-12-05 PROCEDURE — 99285 EMERGENCY DEPT VISIT HI MDM: CPT | Mod: 25

## 2024-12-05 PROCEDURE — 83735 ASSAY OF MAGNESIUM: CPT | Performed by: NURSE PRACTITIONER

## 2024-12-05 PROCEDURE — 96366 THER/PROPH/DIAG IV INF ADDON: CPT

## 2024-12-05 PROCEDURE — G0378 HOSPITAL OBSERVATION PER HR: HCPCS

## 2024-12-05 PROCEDURE — 36415 COLL VENOUS BLD VENIPUNCTURE: CPT | Performed by: NURSE PRACTITIONER

## 2024-12-05 PROCEDURE — 96365 THER/PROPH/DIAG IV INF INIT: CPT

## 2024-12-05 PROCEDURE — 83690 ASSAY OF LIPASE: CPT | Performed by: NURSE PRACTITIONER

## 2024-12-05 PROCEDURE — 84702 CHORIONIC GONADOTROPIN TEST: CPT | Performed by: NURSE PRACTITIONER

## 2024-12-05 PROCEDURE — 63600175 PHARM REV CODE 636 W HCPCS: Performed by: PHYSICIAN ASSISTANT

## 2024-12-05 PROCEDURE — 94799 UNLISTED PULMONARY SVC/PX: CPT

## 2024-12-05 PROCEDURE — 94760 N-INVAS EAR/PLS OXIMETRY 1: CPT

## 2024-12-05 PROCEDURE — 63600175 PHARM REV CODE 636 W HCPCS: Performed by: NURSE PRACTITIONER

## 2024-12-05 PROCEDURE — 99900035 HC TECH TIME PER 15 MIN (STAT)

## 2024-12-05 PROCEDURE — 96376 TX/PRO/DX INJ SAME DRUG ADON: CPT

## 2024-12-05 PROCEDURE — 85025 COMPLETE CBC W/AUTO DIFF WBC: CPT | Performed by: NURSE PRACTITIONER

## 2024-12-05 PROCEDURE — 25000003 PHARM REV CODE 250: Performed by: PHYSICIAN ASSISTANT

## 2024-12-05 RX ORDER — INSULIN ASPART 100 [IU]/ML
1-10 INJECTION, SOLUTION INTRAVENOUS; SUBCUTANEOUS
Status: DISCONTINUED | OUTPATIENT
Start: 2024-12-05 | End: 2024-12-06 | Stop reason: HOSPADM

## 2024-12-05 RX ORDER — SIMETHICONE 80 MG
1 TABLET,CHEWABLE ORAL 4 TIMES DAILY PRN
Status: DISCONTINUED | OUTPATIENT
Start: 2024-12-05 | End: 2024-12-06 | Stop reason: HOSPADM

## 2024-12-05 RX ORDER — OXYCODONE HYDROCHLORIDE 5 MG/1
5 TABLET ORAL EVERY 6 HOURS PRN
Status: DISCONTINUED | OUTPATIENT
Start: 2024-12-05 | End: 2024-12-06 | Stop reason: HOSPADM

## 2024-12-05 RX ORDER — IBUPROFEN 200 MG
24 TABLET ORAL
Status: DISCONTINUED | OUTPATIENT
Start: 2024-12-05 | End: 2024-12-06 | Stop reason: HOSPADM

## 2024-12-05 RX ORDER — ALUMINUM HYDROXIDE, MAGNESIUM HYDROXIDE, AND SIMETHICONE 1200; 120; 1200 MG/30ML; MG/30ML; MG/30ML
30 SUSPENSION ORAL 4 TIMES DAILY PRN
Status: DISCONTINUED | OUTPATIENT
Start: 2024-12-05 | End: 2024-12-06 | Stop reason: HOSPADM

## 2024-12-05 RX ORDER — POTASSIUM CHLORIDE 7.45 MG/ML
10 INJECTION INTRAVENOUS
Status: COMPLETED | OUTPATIENT
Start: 2024-12-05 | End: 2024-12-06

## 2024-12-05 RX ORDER — ACETAMINOPHEN 325 MG/1
650 TABLET ORAL EVERY 6 HOURS PRN
Status: DISCONTINUED | OUTPATIENT
Start: 2024-12-05 | End: 2024-12-06 | Stop reason: HOSPADM

## 2024-12-05 RX ORDER — ACETAMINOPHEN 325 MG/1
650 TABLET ORAL EVERY 4 HOURS PRN
Status: DISCONTINUED | OUTPATIENT
Start: 2024-12-05 | End: 2024-12-06 | Stop reason: HOSPADM

## 2024-12-05 RX ORDER — HYDROMORPHONE HYDROCHLORIDE 1 MG/ML
1 INJECTION, SOLUTION INTRAMUSCULAR; INTRAVENOUS; SUBCUTANEOUS EVERY 4 HOURS PRN
Status: DISCONTINUED | OUTPATIENT
Start: 2024-12-05 | End: 2024-12-06 | Stop reason: HOSPADM

## 2024-12-05 RX ORDER — GLUCAGON 1 MG
1 KIT INJECTION
Status: DISCONTINUED | OUTPATIENT
Start: 2024-12-05 | End: 2024-12-06 | Stop reason: HOSPADM

## 2024-12-05 RX ORDER — ACETAMINOPHEN 500 MG
1000 TABLET ORAL
Status: COMPLETED | OUTPATIENT
Start: 2024-12-05 | End: 2024-12-05

## 2024-12-05 RX ORDER — IBUPROFEN 200 MG
16 TABLET ORAL
Status: DISCONTINUED | OUTPATIENT
Start: 2024-12-05 | End: 2024-12-06 | Stop reason: HOSPADM

## 2024-12-05 RX ORDER — IPRATROPIUM BROMIDE AND ALBUTEROL SULFATE 2.5; .5 MG/3ML; MG/3ML
3 SOLUTION RESPIRATORY (INHALATION) EVERY 4 HOURS PRN
Status: DISCONTINUED | OUTPATIENT
Start: 2024-12-05 | End: 2024-12-06 | Stop reason: HOSPADM

## 2024-12-05 RX ORDER — ONDANSETRON HYDROCHLORIDE 2 MG/ML
4 INJECTION, SOLUTION INTRAVENOUS EVERY 8 HOURS PRN
Status: DISCONTINUED | OUTPATIENT
Start: 2024-12-05 | End: 2024-12-06 | Stop reason: HOSPADM

## 2024-12-05 RX ORDER — HYDROMORPHONE HYDROCHLORIDE 1 MG/ML
1 INJECTION, SOLUTION INTRAMUSCULAR; INTRAVENOUS; SUBCUTANEOUS
Status: COMPLETED | OUTPATIENT
Start: 2024-12-05 | End: 2024-12-05

## 2024-12-05 RX ORDER — NALOXONE HCL 0.4 MG/ML
0.02 VIAL (ML) INJECTION
Status: DISCONTINUED | OUTPATIENT
Start: 2024-12-05 | End: 2024-12-06 | Stop reason: HOSPADM

## 2024-12-05 RX ORDER — SODIUM CHLORIDE 0.9 % (FLUSH) 0.9 %
10 SYRINGE (ML) INJECTION EVERY 8 HOURS PRN
Status: DISCONTINUED | OUTPATIENT
Start: 2024-12-05 | End: 2024-12-06 | Stop reason: HOSPADM

## 2024-12-05 RX ORDER — TALC
9 POWDER (GRAM) TOPICAL NIGHTLY PRN
Status: DISCONTINUED | OUTPATIENT
Start: 2024-12-05 | End: 2024-12-06 | Stop reason: HOSPADM

## 2024-12-05 RX ORDER — POTASSIUM CHLORIDE 7.45 MG/ML
10 INJECTION INTRAVENOUS
Status: COMPLETED | OUTPATIENT
Start: 2024-12-05 | End: 2024-12-05

## 2024-12-05 RX ADMIN — SODIUM CHLORIDE 250 ML: 9 INJECTION, SOLUTION INTRAVENOUS at 06:12

## 2024-12-05 RX ADMIN — POTASSIUM CHLORIDE 10 MEQ: 7.46 INJECTION, SOLUTION INTRAVENOUS at 09:12

## 2024-12-05 RX ADMIN — HYDROMORPHONE HYDROCHLORIDE 1 MG: 1 INJECTION, SOLUTION INTRAMUSCULAR; INTRAVENOUS; SUBCUTANEOUS at 08:12

## 2024-12-05 RX ADMIN — ACETAMINOPHEN 1000 MG: 500 TABLET ORAL at 06:12

## 2024-12-05 RX ADMIN — POTASSIUM CHLORIDE 10 MEQ: 7.46 INJECTION, SOLUTION INTRAVENOUS at 11:12

## 2024-12-05 RX ADMIN — HYDROMORPHONE HYDROCHLORIDE 1 MG: 1 INJECTION, SOLUTION INTRAMUSCULAR; INTRAVENOUS; SUBCUTANEOUS at 11:12

## 2024-12-05 RX ADMIN — POTASSIUM CHLORIDE 10 MEQ: 7.46 INJECTION, SOLUTION INTRAVENOUS at 06:12

## 2024-12-05 NOTE — FIRST PROVIDER EVALUATION
" Emergency Department TeleTriage Encounter Note      CHIEF COMPLAINT    Chief Complaint   Patient presents with    Abnormal Lab     Patient states she was at dialysis and they told her she needed a blood transfusion and she is having abdominal pain        VITAL SIGNS   Initial Vitals [12/05/24 1609]   BP Pulse Resp Temp SpO2   126/85 78 20 98.4 °F (36.9 °C) 98 %      MAP       --            ALLERGIES    Review of patient's allergies indicates:   Allergen Reactions    Droperidol Hives    Haldol [haloperidol lactate] Hives    Penicillins Hives    Droperidol (bulk) Anxiety     STATES "FREAKS OUT".  TOLERATES HALDOL PRIOR TO ARRIVAL AT ANOTHER FACILITY TODAY.        PROVIDER TRIAGE NOTE  Patient states she was instructed to come to ED for a blood transfusion due to Hgb of 6.9. C/o abdominal pain and fatigue that began after dialysis today. States she frequently gets abd pain similar to this after dialysis.    Limited physical exam via telehealth: The patient is awake, alert, answering questions appropriately and is not in respiratory distress.  As the Teletriage provider, I performed an initial assessment and ordered appropriate labs and imaging studies, if any, to facilitate the patient's care once placed in the ED. Once a room is available, care and a full evaluation will be completed by an alternate ED provider.  Any additional orders and the final disposition will be determined by that provider.  All imaging and labs will not be followed-up by the Teletriage Team, including myself.          ORDERS  Labs Reviewed - No data to display    ED Orders (720h ago, onward)      Start Ordered     Status Ordering Provider    12/05/24 1615 12/05/24 1615  Saline lock IV  Once         Ordered TONY TANG    12/05/24 1615 12/05/24 1615  CBC auto differential  STAT         Pending Collection TONY TANG    12/05/24 1615 12/05/24 1615  Comprehensive Metabolic Panel  STAT         Pending Collection TONY TANG    " 12/05/24 1615 12/05/24 1615  Lipase  STAT         Pending Collection TONY TANG.    12/05/24 1615 12/05/24 1615  Type & Screen  STAT         Pending Collection TONY TANG.    12/05/24 1615 12/05/24 1615  hCG, quantitative, pregnancy  STAT         Pending Collection TONY TANG              Virtual Visit Note: The provider triage portion of this emergency department evaluation and documentation was performed via [employer], a HIPAA-compliant telemedicine application, in concert with a tele-presenter in the room. A face to face patient evaluation with one of my colleagues will occur once the patient is placed in an emergency department room.      DISCLAIMER: This note was prepared with [employer] voice recognition transcription software. Garbled syntax, mangled pronouns, and other bizarre constructions may be attributed to that software system.

## 2024-12-05 NOTE — ED PROVIDER NOTES
"Encounter Date: 2024       History     Chief Complaint   Patient presents with    Abnormal Lab     Patient states she was at dialysis and they told her she needed a blood transfusion and she is having abdominal pain      Tabby Howard is a 34 y.o. female presenting for evaluation of possibly needing a blood transfusion.  She states Dr. Clark told her to come here after dialysis to get a transfusion because her blood counts were "6."  She has associated abdominal pain.  No vomiting.  No difficulty breathing or shortness of breath.  Patient had dialysis earlier today and goes T//Sat.  She has a past medical history of ESRD on hemodialysis (2022), Gastritis (2022), Gastroparesis (2022), Heart failure with preserved ejection fraction (2022), History of supraventricular tachycardia, Hyperkalemia (2022), Hypertensive emergency (2022), Sickle cell trait (2022), and Type 2 diabetes mellitus.      The history is provided by the patient.     Review of patient's allergies indicates:   Allergen Reactions    Droperidol Hives    Haldol [haloperidol lactate] Hives    Penicillins Hives    Droperidol (bulk) Anxiety     STATES "FREAKS OUT".  TOLERATES HALDOL PRIOR TO ARRIVAL AT ANOTHER FACILITY TODAY.      Past Medical History:   Diagnosis Date    ESRD on hemodialysis 2022    Gastritis 2022    EGD was 22    Gastroparesis 2022    has not had Emptying study    Heart failure with preserved ejection fraction 2022    EF 55% on 3/22    History of supraventricular tachycardia     Hyperkalemia 2022    Hypertensive emergency 2022    Sickle cell trait 2022    Type 2 diabetes mellitus      Past Surgical History:   Procedure Laterality Date     SECTION      x 3    COLONOSCOPY      COLONOSCOPY N/A 2022    Procedure: COLONOSCOPY;  Surgeon: Jagdeep Cedeno MD;  Location: [facility];  Service: Endoscopy;  Laterality: N/A;    DESTRUCTION, CILIARY " BODY, USING LASER Left 03/08/2024    Procedure: Cyclophotocoagulation G-probe, retrobulbar chlorpromazine left eye;  Surgeon: Fidel Wise MD;  Location: [address];  Service: Ophthalmology;  Laterality: Left;    ENDOSCOPIC ULTRASOUND OF UPPER GASTROINTESTINAL TRACT N/A 12/16/2023    Procedure: ULTRASOUND, UPPER GI TRACT, ENDOSCOPIC;  Surgeon: Micky Paredes III, MD;  Location: [facility];  Service: Endoscopy;  Laterality: N/A;    ESOPHAGOGASTRODUODENOSCOPY N/A 10/18/2019    Procedure: ESOPHAGOGASTRODUODENOSCOPY (EGD);  Surgeon: Gianluca Mendez MD;  Location: [facility];  Service: Endoscopy;  Laterality: N/A;    ESOPHAGOGASTRODUODENOSCOPY N/A 08/24/2022    Procedure: EGD (ESOPHAGOGASTRODUODENOSCOPY);  Surgeon: Micky Paredes III, MD;  Location: [facility];  Service: Endoscopy;  Laterality: N/A;    ESOPHAGOGASTRODUODENOSCOPY N/A 12/05/2022    Procedure: EGD (ESOPHAGOGASTRODUODENOSCOPY);  Surgeon: Marcelo Zhong MD;  Location: [facility];  Service: Endoscopy;  Laterality: N/A;    ESOPHAGOGASTRODUODENOSCOPY N/A 9/13/2024    Procedure: EGD (ESOPHAGOGASTRODUODENOSCOPY);  Surgeon: Marcelo Zhong MD;  Location: [facility];  Service: Endoscopy;  Laterality: N/A;    EYE SURGERY      INSERTION, CATHETER, TUNNELED N/A 06/17/2023    Procedure: Insertion,catheter,tunneled;  Surgeon: Carlos Thurman Jr., MD;  Location: [facility];  Service: General;  Laterality: N/A;    LAPAROSCOPIC CHOLECYSTECTOMY N/A 07/30/2022    Procedure: CHOLECYSTECTOMY, LAPAROSCOPIC;  Surgeon: Grey Preez MD;  Location: [facility] OR;  Service: General;  Laterality: N/A;    PLACEMENT OF DUAL-LUMEN VASCULAR CATHETER Left 07/12/2022    Procedure: INSERTION-CATHETER-JOSEPH;  Surgeon: Dionte Gan MD;  Location: [facility] OR;  Service: General;  Laterality: Left;    PLACEMENT OF DUAL-LUMEN VASCULAR CATHETER Right 07/26/2022    Procedure: INSERTION-CATHETER-Hemosplit;  Surgeon: Dionte Gan MD;  Location: [facility];  Service: General;   Laterality: Right;     Family History   Problem Relation Name Age of Onset    Diabetes Mother      Diabetes Father       Social History     Tobacco Use    Smoking status: Never    Smokeless tobacco: Never   Substance Use Topics    Alcohol use: Not Currently    Drug use: No     Review of Systems   Constitutional:  Positive for fatigue. Negative for chills and fever.   Respiratory:  Negative for cough, chest tightness, shortness of breath and wheezing.    Cardiovascular:  Negative for chest pain and palpitations.   Gastrointestinal:  Positive for abdominal pain. Negative for constipation, diarrhea, nausea and vomiting.   Genitourinary:  Negative for dysuria and hematuria.   Musculoskeletal:  Negative for arthralgias, back pain, joint swelling, myalgias, neck pain and neck stiffness.   Skin:  Negative for color change, pallor, rash and wound.   Neurological:  Negative for weakness and numbness.   Hematological:  Does not bruise/bleed easily.       Physical Exam     Initial Vitals [12/05/24 1609]   BP Pulse Resp Temp SpO2   126/85 78 20 98.4 °F (36.9 °C) 98 %      MAP       --         Physical Exam    Nursing note and vitals reviewed.  Constitutional: She is not diaphoretic. No distress.   Chronically ill appearing.    HENT:   Head: Normocephalic and atraumatic.   Eyes: Conjunctivae are normal.   Neck: Neck supple.   Normal range of motion.  Cardiovascular:  Normal rate, regular rhythm, normal heart sounds and intact distal pulses.           Pulmonary/Chest: Breath sounds normal. No respiratory distress. She has no wheezes. She has no rhonchi. She has no rales.   Equal, bilateral breath sounds noted without wheezing.     Abdominal: Abdomen is soft. She exhibits no distension and no mass. There is no abdominal tenderness.   No palpable abdominal tenderness noted.      Musculoskeletal:         General: No tenderness or edema. Normal range of motion.      Cervical back: Normal range of motion and neck supple.      Neurological: She is alert and oriented to person, place, and time. She has normal strength. No sensory deficit.   Skin: Skin is warm and dry. No rash and no abscess noted. No erythema.   Psychiatric: She has a normal mood and affect.         ED Course   Procedures  Labs Reviewed   CBC W/ AUTO DIFFERENTIAL - Abnormal       Result Value    WBC 5.61      RBC 2.79 (*)     Hemoglobin 8.2 (*)     Hematocrit 24.8 (*)     MCV 89      MCH 29.4      MCHC 33.1      RDW 16.8 (*)     Platelets 176      MPV 10.1      Immature Granulocytes 0.4      Gran # (ANC) 4.1      Immature Grans (Abs) 0.02      Lymph # 0.8 (*)     Mono # 0.5      Eos # 0.2      Baso # 0.04      nRBC 0      Gran % 73.2 (*)     Lymph % 13.9 (*)     Mono % 8.9      Eosinophil % 2.9      Basophil % 0.7      Differential Method Automated      Narrative:     Release to patient->Immediate   COMPREHENSIVE METABOLIC PANEL - Abnormal    Sodium 137      Potassium 2.8 (*)     Chloride 95      CO2 29      Glucose 178 (*)     BUN 10      Creatinine 2.7 (*)     Calcium 9.1      Total Protein 7.6      Albumin 3.5      Total Bilirubin 0.8      Alkaline Phosphatase 163 (*)     AST 19      ALT 19      eGFR 23 (*)     Anion Gap 13      Narrative:     Release to patient->Immediate  POTASSIUM   critical result(s) called and verbal readback obtained   from  AKASH CAMARILLO by TN3 12/05/2024 17:22   LIPASE    Lipase 23      Narrative:     Release to patient->Immediate   HCG, QUANTITATIVE    HCG Quant <1.2      Narrative:     Release to patient->Immediate   TYPE & SCREEN    Group & Rh A POS      Indirect Jemal NEG      Specimen Outdate 12/08/2024 23:59            Imaging Results    None          Medications   HYDROmorphone injection 1 mg (has no administration in time range)   potassium chloride 10 mEq in 100 mL IVPB (10 mEq Intravenous New Bag 12/5/24 1451)   sodium chloride 0.9% bolus 250 mL 250 mL (250 mLs Intravenous New Bag 12/5/24 4691)   acetaminophen tablet 1,000 mg  (1,000 mg Oral Given 12/5/24 3491)     Medical Decision Making  Differential Diagnosis:   Anemia   Electrolyte Imbalance   Chronic Pain   CKD on Dialysis     Pt emergently evaluated here in the ED.    Potassium shows level of 2.8.  H and H is 8.2 and 24.8.  Patient will be admitted to Hospital Medicine for further evaluation and management. Patient voices understanding and is agreeable to the plan.      Amount and/or Complexity of Data Reviewed  Labs: ordered. Decision-making details documented in ED Course.    Risk  OTC drugs.  Prescription drug management.  Decision regarding hospitalization.                                      Clinical Impression:  Final diagnoses:  [E87.6] Hypokalemia (Primary)  [N18.6, Z99.2] ESRD (end stage renal disease) on dialysis          ED Disposition Condition    Admit Stable                Thelma Mata, SALLY  12/05/24 1959

## 2024-12-06 VITALS
TEMPERATURE: 98 F | RESPIRATION RATE: 16 BRPM | BODY MASS INDEX: 20.16 KG/M2 | DIASTOLIC BLOOD PRESSURE: 83 MMHG | OXYGEN SATURATION: 99 % | HEART RATE: 86 BPM | HEIGHT: 62 IN | WEIGHT: 109.56 LBS | SYSTOLIC BLOOD PRESSURE: 177 MMHG

## 2024-12-06 LAB
ALBUMIN SERPL BCP-MCNC: 3.1 G/DL (ref 3.5–5.2)
ALP SERPL-CCNC: 152 U/L (ref 40–150)
ALT SERPL W/O P-5'-P-CCNC: 18 U/L (ref 10–44)
ANION GAP SERPL CALC-SCNC: 12 MMOL/L (ref 8–16)
AST SERPL-CCNC: 21 U/L (ref 10–40)
BASOPHILS # BLD AUTO: 0.03 K/UL (ref 0–0.2)
BASOPHILS NFR BLD: 0.7 % (ref 0–1.9)
BILIRUB SERPL-MCNC: 0.7 MG/DL (ref 0.1–1)
BUN SERPL-MCNC: 12 MG/DL (ref 6–20)
CALCIUM SERPL-MCNC: 9.8 MG/DL (ref 8.7–10.5)
CHLORIDE SERPL-SCNC: 96 MMOL/L (ref 95–110)
CO2 SERPL-SCNC: 28 MMOL/L (ref 23–29)
CREAT SERPL-MCNC: 4 MG/DL (ref 0.5–1.4)
DIFFERENTIAL METHOD BLD: ABNORMAL
EOSINOPHIL # BLD AUTO: 0.2 K/UL (ref 0–0.5)
EOSINOPHIL NFR BLD: 3.9 % (ref 0–8)
ERYTHROCYTE [DISTWIDTH] IN BLOOD BY AUTOMATED COUNT: 16.9 % (ref 11.5–14.5)
EST. GFR  (NO RACE VARIABLE): 14 ML/MIN/1.73 M^2
GLUCOSE SERPL-MCNC: 262 MG/DL (ref 70–110)
GLUCOSE SERPL-MCNC: 29 MG/DL (ref 70–110)
HCT VFR BLD AUTO: 27.3 % (ref 37–48.5)
HGB BLD-MCNC: 8.8 G/DL (ref 12–16)
IMM GRANULOCYTES # BLD AUTO: 0.01 K/UL (ref 0–0.04)
IMM GRANULOCYTES NFR BLD AUTO: 0.2 % (ref 0–0.5)
LYMPHOCYTES # BLD AUTO: 0.8 K/UL (ref 1–4.8)
LYMPHOCYTES NFR BLD: 19.1 % (ref 18–48)
MAGNESIUM SERPL-MCNC: 1.9 MG/DL (ref 1.6–2.6)
MCH RBC QN AUTO: 29 PG (ref 27–31)
MCHC RBC AUTO-ENTMCNC: 32.2 G/DL (ref 32–36)
MCV RBC AUTO: 90 FL (ref 82–98)
MONOCYTES # BLD AUTO: 0.3 K/UL (ref 0.3–1)
MONOCYTES NFR BLD: 7.1 % (ref 4–15)
NEUTROPHILS # BLD AUTO: 3 K/UL (ref 1.8–7.7)
NEUTROPHILS NFR BLD: 69 % (ref 38–73)
NRBC BLD-RTO: 0 /100 WBC
PHOSPHATE SERPL-MCNC: 3.2 MG/DL (ref 2.7–4.5)
PLATELET # BLD AUTO: 188 K/UL (ref 150–450)
PMV BLD AUTO: 10 FL (ref 9.2–12.9)
POCT GLUCOSE: 222 MG/DL (ref 70–110)
POCT GLUCOSE: 24 MG/DL (ref 70–110)
POCT GLUCOSE: 25 MG/DL (ref 70–110)
POCT GLUCOSE: 267 MG/DL (ref 70–110)
POTASSIUM SERPL-SCNC: 4.2 MMOL/L (ref 3.5–5.1)
PROT SERPL-MCNC: 6.9 G/DL (ref 6–8.4)
RBC # BLD AUTO: 3.03 M/UL (ref 4–5.4)
SODIUM SERPL-SCNC: 136 MMOL/L (ref 136–145)
WBC # BLD AUTO: 4.34 K/UL (ref 3.9–12.7)

## 2024-12-06 PROCEDURE — 99900035 HC TECH TIME PER 15 MIN (STAT)

## 2024-12-06 PROCEDURE — 82947 ASSAY GLUCOSE BLOOD QUANT: CPT | Performed by: STUDENT IN AN ORGANIZED HEALTH CARE EDUCATION/TRAINING PROGRAM

## 2024-12-06 PROCEDURE — 36415 COLL VENOUS BLD VENIPUNCTURE: CPT | Performed by: NURSE PRACTITIONER

## 2024-12-06 PROCEDURE — 25000003 PHARM REV CODE 250

## 2024-12-06 PROCEDURE — G0378 HOSPITAL OBSERVATION PER HR: HCPCS

## 2024-12-06 PROCEDURE — 83735 ASSAY OF MAGNESIUM: CPT | Performed by: NURSE PRACTITIONER

## 2024-12-06 PROCEDURE — 36415 COLL VENOUS BLD VENIPUNCTURE: CPT | Performed by: STUDENT IN AN ORGANIZED HEALTH CARE EDUCATION/TRAINING PROGRAM

## 2024-12-06 PROCEDURE — 96376 TX/PRO/DX INJ SAME DRUG ADON: CPT

## 2024-12-06 PROCEDURE — 80053 COMPREHEN METABOLIC PANEL: CPT | Performed by: NURSE PRACTITIONER

## 2024-12-06 PROCEDURE — 63600175 PHARM REV CODE 636 W HCPCS: Performed by: NURSE PRACTITIONER

## 2024-12-06 PROCEDURE — 96372 THER/PROPH/DIAG INJ SC/IM: CPT | Performed by: NURSE PRACTITIONER

## 2024-12-06 PROCEDURE — 94760 N-INVAS EAR/PLS OXIMETRY 1: CPT

## 2024-12-06 PROCEDURE — 85025 COMPLETE CBC W/AUTO DIFF WBC: CPT | Performed by: NURSE PRACTITIONER

## 2024-12-06 PROCEDURE — 84100 ASSAY OF PHOSPHORUS: CPT | Performed by: NURSE PRACTITIONER

## 2024-12-06 PROCEDURE — 25000003 PHARM REV CODE 250: Performed by: NURSE PRACTITIONER

## 2024-12-06 RX ORDER — LEVETIRACETAM 500 MG/1
500 TABLET ORAL 2 TIMES DAILY
Status: DISCONTINUED | OUTPATIENT
Start: 2024-12-06 | End: 2024-12-06 | Stop reason: HOSPADM

## 2024-12-06 RX ORDER — CARVEDILOL 6.25 MG/1
6.25 TABLET ORAL 2 TIMES DAILY
Status: DISCONTINUED | OUTPATIENT
Start: 2024-12-06 | End: 2024-12-06 | Stop reason: HOSPADM

## 2024-12-06 RX ORDER — HYDRALAZINE HYDROCHLORIDE 25 MG/1
50 TABLET, FILM COATED ORAL EVERY 8 HOURS
Status: DISCONTINUED | OUTPATIENT
Start: 2024-12-06 | End: 2024-12-06 | Stop reason: HOSPADM

## 2024-12-06 RX ADMIN — INSULIN ASPART 6 UNITS: 100 INJECTION, SOLUTION INTRAVENOUS; SUBCUTANEOUS at 07:12

## 2024-12-06 RX ADMIN — CARVEDILOL 6.25 MG: 6.25 TABLET, FILM COATED ORAL at 01:12

## 2024-12-06 RX ADMIN — HYDROMORPHONE HYDROCHLORIDE 1 MG: 1 INJECTION, SOLUTION INTRAMUSCULAR; INTRAVENOUS; SUBCUTANEOUS at 11:12

## 2024-12-06 RX ADMIN — HYDRALAZINE HYDROCHLORIDE 50 MG: 25 TABLET ORAL at 01:12

## 2024-12-06 RX ADMIN — LEVETIRACETAM 500 MG: 500 TABLET, FILM COATED ORAL at 01:12

## 2024-12-06 RX ADMIN — HYDROMORPHONE HYDROCHLORIDE 1 MG: 1 INJECTION, SOLUTION INTRAMUSCULAR; INTRAVENOUS; SUBCUTANEOUS at 04:12

## 2024-12-06 RX ADMIN — POTASSIUM CHLORIDE 10 MEQ: 7.46 INJECTION, SOLUTION INTRAVENOUS at 01:12

## 2024-12-06 RX ADMIN — HYDROMORPHONE HYDROCHLORIDE 1 MG: 1 INJECTION, SOLUTION INTRAMUSCULAR; INTRAVENOUS; SUBCUTANEOUS at 08:12

## 2024-12-06 RX ADMIN — DEXTROSE MONOHYDRATE 250 ML: 100 INJECTION, SOLUTION INTRAVENOUS at 10:12

## 2024-12-06 NOTE — ASSESSMENT & PLAN NOTE
Patient's most recent potassium results are listed below.   Recent Labs     12/05/24  1634   K 2.8*     Plan  - Replete potassium per protocol  - Monitor potassium Daily  - Patient's hypokalemia is stable  -

## 2024-12-06 NOTE — SUBJECTIVE & OBJECTIVE
Past Medical History:   Diagnosis Date    ESRD on hemodialysis 2022    Gastritis 2022    EGD was 22    Gastroparesis 2022    has not had Emptying study    Heart failure with preserved ejection fraction 2022    EF 55% on 3/22    History of supraventricular tachycardia     Hyperkalemia 2022    Hypertensive emergency 2022    Sickle cell trait 2022    Type 2 diabetes mellitus        Past Surgical History:   Procedure Laterality Date     SECTION      x 3    COLONOSCOPY      COLONOSCOPY N/A 2022    Procedure: COLONOSCOPY;  Surgeon: Jagdeep Cedeno MD;  Location: [facility];  Service: Endoscopy;  Laterality: N/A;    DESTRUCTION, CILIARY BODY, USING LASER Left 2024    Procedure: Cyclophotocoagulation G-probe, retrobulbar chlorpromazine left eye;  Surgeon: Fidel Wise MD;  Location: [address];  Service: Ophthalmology;  Laterality: Left;    ENDOSCOPIC ULTRASOUND OF UPPER GASTROINTESTINAL TRACT N/A 2023    Procedure: ULTRASOUND, UPPER GI TRACT, ENDOSCOPIC;  Surgeon: Micky Paredes III, MD;  Location: [facility];  Service: Endoscopy;  Laterality: N/A;    ESOPHAGOGASTRODUODENOSCOPY N/A 10/18/2019    Procedure: ESOPHAGOGASTRODUODENOSCOPY (EGD);  Surgeon: Gianluca Mendez MD;  Location: [facility];  Service: Endoscopy;  Laterality: N/A;    ESOPHAGOGASTRODUODENOSCOPY N/A 2022    Procedure: EGD (ESOPHAGOGASTRODUODENOSCOPY);  Surgeon: Micky Paredes III, MD;  Location: [facility];  Service: Endoscopy;  Laterality: N/A;    ESOPHAGOGASTRODUODENOSCOPY N/A 2022    Procedure: EGD (ESOPHAGOGASTRODUODENOSCOPY);  Surgeon: Marcelo Zhong MD;  Location: [facility];  Service: Endoscopy;  Laterality: N/A;    ESOPHAGOGASTRODUODENOSCOPY N/A 2024    Procedure: EGD (ESOPHAGOGASTRODUODENOSCOPY);  Surgeon: Marcelo Zhong MD;  Location: [facility];  Service: Endoscopy;  Laterality: N/A;    EYE SURGERY      INSERTION, CATHETER, TUNNELED N/A  "06/17/2023    Procedure: Insertion,catheter,tunneled;  Surgeon: Carlos Thurman Jr., MD;  Location: [facility] OR;  Service: General;  Laterality: N/A;    LAPAROSCOPIC CHOLECYSTECTOMY N/A 07/30/2022    Procedure: CHOLECYSTECTOMY, LAPAROSCOPIC;  Surgeon: Grey Perez MD;  Location: [facility] OR;  Service: General;  Laterality: N/A;    PLACEMENT OF DUAL-LUMEN VASCULAR CATHETER Left 07/12/2022    Procedure: INSERTION-CATHETER-JOSEPH;  Surgeon: Dionte Gan MD;  Location: [facility] OR;  Service: General;  Laterality: Left;    PLACEMENT OF DUAL-LUMEN VASCULAR CATHETER Right 07/26/2022    Procedure: INSERTION-CATHETER-Hemosplit;  Surgeon: Dionte Gan MD;  Location: [facility] OR;  Service: General;  Laterality: Right;       Review of patient's allergies indicates:   Allergen Reactions    Droperidol Hives    Haldol [haloperidol lactate] Hives    Penicillins Hives    Droperidol (bulk) Anxiety     STATES "FREAKS OUT".  TOLERATES HALDOL PRIOR TO ARRIVAL AT ANOTHER FACILITY TODAY.        No current facility-administered medications on file prior to encounter.     Current Outpatient Medications on File Prior to Encounter   Medication Sig    apixaban (ELIQUIS) 5 mg Tab Take 1 tablet (5 mg total) by mouth 2 (two) times daily. Patient w/ anemia, so held during admission, follow-up with hematologist about restarting    aspirin 325 MG tablet Take 1 tablet (325 mg total) by mouth once daily.    atropine 1% (ISOPTO ATROPINE) 1 % Drop Place 1 drop into the left eye 2 (two) times a day.    blood sugar diagnostic (TRUE METRIX GLUCOSE TEST STRIP) Strp Use 1 strip to check blood glucose three times daily    blood-glucose meter (TRUE METRIX GLUCOSE METER) Misc Use to check blood glucose    blood-glucose meter,continuous Misc USE WITH SENSORS    blood-glucose sensor (DEXCOM G7 SENSOR) Heather Use as directed for CGM. Change sensor every 10 days    blood-glucose sensor Heather CHANGE EVERY 10 DAYS    brimonidine 0.15 % OPTH DROP (ALPHAGAN) 0.15 % " ophthalmic solution Place 1 drop into both eyes 3 (three) times daily.    brinzolamide (AZOPT) 1 % ophthalmic suspension Place1 drop in left eye 3 times a day for 30 days (Patient taking differently: Place 1 drop into the left eye 3 (three) times daily.)    busPIRone (BUSPAR) 10 MG tablet Take 1 tablet (10 mg total) by mouth 3 (three) times daily as needed (anxiety).    busPIRone (BUSPAR) 5 MG Tab take 1 tablet by mouth daily    calcium acetate,phosphat bind, (PHOSLO) 667 mg capsule take 2 capsules by mouth 3 times daily with meals    carvediloL (COREG) 6.25 MG tablet Take 1 tablet (6.25 mg total) by mouth 2 (two) times a day.    cetirizine (ZYRTEC) 10 MG tablet Take 1 tablet every day by oral route. (Patient taking differently: Take 10 mg by mouth once daily.)    clopidogreL (PLAVIX) 75 mg tablet take 1 tablet by mouth daily    dorzolamide-timolol 2-0.5% (COSOPT) 22.3-6.8 mg/mL ophthalmic solution place 1 drop in left eye twice a day  for 30 days (Patient taking differently: Place 1 drop into the left eye 2 (two) times daily.)    fluticasone propionate (FLONASE ALLERGY RELIEF) 50 mcg/actuation nasal spray Dennis 1 spray every day by intranasal route. (Patient taking differently: 1 spray by Each Nostril route Daily.)    gabapentin (NEURONTIN) 300 MG capsule Take 2 capsules twice a day by oral route for 90 days. (Patient taking differently: Take 600 mg by mouth 2 (two) times daily.)    hydrALAZINE (APRESOLINE) 50 MG tablet Take 1 tablet (50 mg total) by mouth every 8 (eight) hours.    HYDROcodone-acetaminophen (NORCO) 5-325 mg per tablet Take 1 tablet by mouth every 4 (four) hours as needed for Pain (flank pain).    HYDROcodone-acetaminophen (NORCO) 7.5-325 mg per tablet take 1 tablet by mouth 3 times daily as needed for pain, moderate to severe (4-10)    insulin aspart U-100 (NOVOLOG) 100 unit/mL (3 mL) InPn pen Inject 8 Units into the skin 3 (three) times daily with meals.    lancets (TRUEPLUS LANCETS) 33 gauge Misc  "Use 1 to check blood glucose three times daily    levETIRAcetam (KEPPRA) 500 MG Tab Take 1 tablet (500 mg total) by mouth 2 (two) times a day.    mirtazapine (REMERON) 15 MG tablet Take 1 tablet (15 mg total) by mouth once daily at bedtime    pantoprazole (PROTONIX) 40 MG tablet Take 1 tablet (40 mg total) by mouth once daily.    pen needle, diabetic 31 gauge x 3/16" Ndle Use as directed with insulin once daily    prednisoLONE acetate (PRED FORTE) 1 % DrpS Place 1 drop into the left eye 2 (two) times daily.    rosuvastatin (CRESTOR) 20 MG tablet take 1 tablet by mouth daily at bedtime    rosuvastatin (CRESTOR) 5 MG tablet Take 2 tablets (10 mg total) by mouth once daily at bedtime    sevelamer carbonate (RENVELA) 800 mg Tab Take 1 tablet (800 mg total) by mouth 3 (three) times daily with meals.    sodium bicarbonate 650 MG tablet Take 1 tablet (650 mg total) by mouth 3 (three) times daily.    sucralfate (CARAFATE) 1 gram tablet Take 1 tablet (1 g total) by mouth 4 (four) times daily.    timolol maleate 0.5% (TIMOPTIC) 0.5 % Drop Place 1 drop in left eye 2 times a day for 30 days (Patient taking differently: Place 1 drop into the left eye 2 (two) times daily.)    [DISCONTINUED] albuterol (VENTOLIN HFA) 90 mcg/actuation inhaler Inhale 2 puffs every 4 hours by inhalation route.    [DISCONTINUED] atenoloL (TENORMIN) 50 MG tablet Take 1 tablet (50 mg total) by mouth every other day.    [DISCONTINUED] FLUoxetine 40 MG capsule Take 1 capsule every day by oral route.    [DISCONTINUED] insulin detemir U-100, Levemir, (LEVEMIR FLEXTOUCH U100 INSULIN) 100 unit/mL (3 mL) InPn pen Inject 22 units every day by subcutaneous route in the evening for 90 days.    [DISCONTINUED] omeprazole (PRILOSEC) 20 MG capsule Take 2 capsules (40 mg total) by mouth once daily. for 10 days     Family History       Problem Relation (Age of Onset)    Diabetes Mother, Father          Tobacco Use    Smoking status: Never    Smokeless tobacco: Never "   Substance and Sexual Activity    Alcohol use: Not Currently    Drug use: No    Sexual activity: Not Currently     Partners: Male     Birth control/protection: I.U.D.     Review of Systems   Constitutional:  Positive for activity change. Negative for chills, diaphoresis and fever.   HENT:  Negative for congestion, nosebleeds and tinnitus.    Eyes:  Negative for photophobia and visual disturbance.   Respiratory:  Negative for cough, chest tightness, shortness of breath and wheezing.    Cardiovascular:  Negative for chest pain, palpitations and leg swelling.   Gastrointestinal:  Positive for abdominal pain. Negative for abdominal distention, constipation, diarrhea, nausea and vomiting.   Endocrine: Negative for cold intolerance and heat intolerance.   Genitourinary:  Negative for difficulty urinating, dysuria, frequency, hematuria and urgency.   Musculoskeletal:  Positive for back pain. Negative for arthralgias and myalgias.   Skin:  Negative for pallor, rash and wound.   Allergic/Immunologic: Negative for immunocompromised state.   Neurological:  Negative for dizziness, tremors, facial asymmetry, speech difficulty and weakness.   Hematological:  Negative for adenopathy. Does not bruise/bleed easily.   Psychiatric/Behavioral:  Negative for confusion and sleep disturbance. The patient is not nervous/anxious.      Objective:     Vital Signs (Most Recent):  Temp: 98.4 °F (36.9 °C) (12/05/24 1609)  Pulse: 81 (12/05/24 2032)  Resp: 18 (12/05/24 2002)  BP: (!) 174/83 (12/05/24 2032)  SpO2: 98 % (12/05/24 1609) Vital Signs (24h Range):  Temp:  [98.4 °F (36.9 °C)] 98.4 °F (36.9 °C)  Pulse:  [78-85] 81  Resp:  [18-20] 18  SpO2:  [98 %] 98 %  BP: (126-187)/(83-97) 174/83     Weight: 53.1 kg (117 lb)  Body mass index is 20.73 kg/m².     Physical Exam  Vitals and nursing note reviewed.   Constitutional:       General: She is not in acute distress.     Appearance: She is well-developed. She is ill-appearing. She is not  diaphoretic.   HENT:      Head: Normocephalic.      Mouth/Throat:      Mouth: Mucous membranes are dry.   Eyes:      General: No scleral icterus.     Conjunctiva/sclera: Conjunctivae normal.      Pupils: Pupils are equal, round, and reactive to light.   Neck:      Vascular: No JVD.   Cardiovascular:      Rate and Rhythm: Normal rate and regular rhythm.      Heart sounds: Normal heart sounds. No murmur heard.     No friction rub. No gallop.   Pulmonary:      Effort: Pulmonary effort is normal. No respiratory distress.      Breath sounds: Normal breath sounds. No wheezing or rales.   Abdominal:      General: Bowel sounds are normal. There is no distension.      Palpations: Abdomen is soft.      Tenderness: There is no abdominal tenderness. There is no guarding or rebound.   Musculoskeletal:         General: No tenderness. Normal range of motion.      Cervical back: Normal range of motion and neck supple.   Lymphadenopathy:      Cervical: No cervical adenopathy.   Skin:     General: Skin is warm and dry.      Capillary Refill: Capillary refill takes less than 2 seconds.      Coloration: Skin is not pale.      Findings: No erythema or rash.   Neurological:      Mental Status: She is alert and oriented to person, place, and time.      Cranial Nerves: No cranial nerve deficit.      Sensory: No sensory deficit.      Coordination: Coordination normal.      Deep Tendon Reflexes: Reflexes normal.   Psychiatric:         Behavior: Behavior normal.         Thought Content: Thought content normal.         Judgment: Judgment normal.              CRANIAL NERVES     CN III, IV, VI   Pupils are equal, round, and reactive to light.       Significant Labs: All pertinent labs within the past 24 hours have been reviewed.  CBC:   Recent Labs   Lab 12/05/24  1634   WBC 5.61   HGB 8.2*   HCT 24.8*        CMP:   Recent Labs   Lab 12/05/24  1634      K 2.8*   CL 95   CO2 29   *   BUN 10   CREATININE 2.7*   CALCIUM 9.1    PROT 7.6   ALBUMIN 3.5   BILITOT 0.8   ALKPHOS 163*   AST 19   ALT 19   ANIONGAP 13       Significant Imaging: I have reviewed all pertinent imaging results/findings within the past 24 hours.

## 2024-12-06 NOTE — ASSESSMENT & PLAN NOTE
Creatine stable for now. BMP reviewed- noted Estimated Creatinine Clearance: 24.1 mL/min (A) (based on SCr of 2.7 mg/dL (H)). according to latest data. Based on current GFR, CKD stage is stage 5 - GFR < 15.  Monitor UOP and serial BMP and adjust therapy as needed. Renally dose meds. Avoid nephrotoxic medications and procedures.

## 2024-12-06 NOTE — PLAN OF CARE
[facility] - Med/Surg  Initial Discharge Assessment       Primary Care Provider: Melony Kim NP    Admission Diagnosis: Hypokalemia [E87.6]    Admission Date: 12/5/2024  Expected Discharge Date: 12/6/2024     met with the patient at the bedside to complete the initial discharge assessment plan. Demographics, PCP, and other information on the face sheet verified by the patient as being correct.  The patient live with her 3 dependent children. She remains independent in all ADLs. She goes to  TTS at [facility] in [place]. DME: glucometer. Pharmacy: [facility] [place]. PCP: Melony Kim FNP. She denies coumadin and HHC. The anticipated DC Disposition is home. She stated her dad will drive her home at discharge. CM will continue to follow and assess DC needs throughout this hospitalization.      Transition of Care Barriers: None    Payor: MEDICAID / Plan: HEALTHY BLUE [place] / Product Type: Managed Medicaid /     Extended Emergency Contact Information  Primary Emergency Contact: Svetlana Freire  Home Phone: [phone]  Mobile Phone: [phone]  Relation: Mother  Preferred language: English   needed? No  Secondary Emergency Contact: Garcia Howard  Address: [address] [place] [address] of [facility]  Home Phone: [phone]  Mobile Phone: [phone]  Relation: Father  Preferred language: English   needed? No    Discharge Plan A: Home  Discharge Plan B: Home with family      [facility]  [address]  [facility] [address]  Phone: [phone] Fax: [phone]      Initial Assessment (most recent)       Adult Discharge Assessment - 12/06/24 1054          Discharge Assessment    Assessment Type Discharge Planning Assessment     Confirmed/corrected address, phone number and insurance Yes     Confirmed Demographics Correct on Facesheet     Source of Information patient     Does  patient/caregiver understand observation status Yes     Communicated TATIANA with patient/caregiver Yes     Reason For Admission Hypokalemia     People in Home child(tammy), dependent     Do you expect to return to your current living situation? Yes     Do you have help at home or someone to help you manage your care at home? Yes     Who are your caregiver(s) and their phone number(s)? Mother, Dad     Prior to hospitilization cognitive status: Alert/Oriented     Current cognitive status: Alert/Oriented     Walking or Climbing Stairs Difficulty no     Dressing/Bathing Difficulty no     Home Layout Able to live on 1st floor     Equipment Currently Used at Home glucometer     Readmission within 30 days? Yes     Patient currently being followed by outpatient case management? Yes     If yes, name of outpatient case management following: [facility] case management     Do you currently have service(s) that help you manage your care at home? No     Do you take prescription medications? Yes     Do you have prescription coverage? Yes     Do you have any problems affording any of your prescribed medications? No     Who is going to help you get home at discharge? Father     How do you get to doctors appointments? family or friend will provide     Are you on dialysis? Yes     Dialysis Name and Scheduled days [facility]     Do you take coumadin? No     Discharge Plan A Home     Discharge Plan B Home with family     DME Needed Upon Discharge  none     Discharge Plan discussed with: Patient     Transition of Care Barriers None

## 2024-12-06 NOTE — ASSESSMENT & PLAN NOTE
"Chronic problem   Patient's FSGs are uncontrolled due to hyperglycemia on current medication regimen.  Last A1c reviewed-   Lab Results   Component Value Date    HGBA1C 9.0 (H) 09/10/2024     Most recent fingerstick glucose reviewed- No results for input(s): "POCTGLUCOSE" in the last 24 hours.  Current correctional scale  Medium  Maintain anti-hyperglycemic dose as follows-   Antihyperglycemics (From admission, onward)      Start     Stop Route Frequency Ordered    12/05/24 2141  insulin aspart U-100 pen 1-10 Units         -- SubQ Before meals & nightly PRN 12/05/24 2044          Hold Oral hypoglycemics while patient is in the hospital.     "

## 2024-12-06 NOTE — DISCHARGE SUMMARY
[facility] Medicine  Discharge Summary      Patient Name: Tabby Howard  MRN: [id number]  UNIQUE: [id number]  Patient Class: OP- Observation  Admission Date: 12/5/2024  Hospital Length of Stay: 0 days  Discharge Date and Time:  12/06/2024 2:40 PM  Attending Physician: Raymundo Parker MD   Discharging Provider: JACQUELINE Santos  Primary Care Provider: Melony Kim NP    Primary Care Team: Networked reference to record PCT     HPI:   Tabby Howard is a 34-year-old female who presented to the emergency room for evaluation of possible anemia requiring blood transfusion.  She reports she was told by her nephrologist to come to the ER after dialysis as she would need a blood transfusion due to hemoglobin of 6.  She has acute on chronic abdominal and back pain.  She denies fever chills.  No known sick contacts or travel.  She has a complex medical history including end-stage renal disease, gastroparesis, anemia, diabetes, and CHF.  ER workup: CBC with anemia of 8/24 (baseline).  CMP with potassium of 2.8 (repleted) and creatinine 2.7.  Patient will be admitted to Hospital Medicine for treatment and management.  Will monitor and replete electrolytes p.r.n..  Nephrology consult in a.m..                * No surgery found *      Hospital Course:   34 year old female admitted with hypokalemia.  Her potassium was replaced and she was monitored closely overnight.  Repeat CMP showed improvement in potassium.  H&H was stable and did not require transfusion.  She did have an episode of hypoglycemia, which resolved with dextrose.  She has glucometer at home and SSI.  Plan of care discussed with patient and she verbalized understanding.  Patient was seen and examined on the day of discharge.      Goals of Care Treatment Preferences:  Code Status: Full Code    Health care agent: Step-Mother Tanya Howard / Father Garcia Howard  Health care agent number: [phone] / [phone]              "         Consults:     * Hypokalemia  Patient's most recent potassium results are listed below.   Recent Labs     12/05/24  1634   K 2.8*     Plan  - Replete potassium per protocol  - Monitor potassium Daily  - Patient's hypokalemia is stable  -     Type 2 diabetes mellitus with hyperglycemia, with long-term current use of insulin, previous HbA1c 5.8%  Chronic problem   Patient's FSGs are uncontrolled due to hyperglycemia on current medication regimen.  Last A1c reviewed-   Lab Results   Component Value Date    HGBA1C 9.0 (H) 09/10/2024     Most recent fingerstick glucose reviewed- No results for input(s): "POCTGLUCOSE" in the last 24 hours.  Current correctional scale  Medium  Maintain anti-hyperglycemic dose as follows-   Antihyperglycemics (From admission, onward)      Start     Stop Route Frequency Ordered    12/05/24 2141  insulin aspart U-100 pen 1-10 Units         -- SubQ Before meals & nightly PRN 12/05/24 2044          Hold Oral hypoglycemics while patient is in the hospital.       Uncontrolled hypertension  Patient's blood pressure range in the last 24 hours was: BP  Min: 126/85  Max: 187/86.The patient's inpatient anti-hypertensive regimen is listed below:  Current Antihypertensives       Plan  - BP is controlled, no changes needed to their regimen  -     ESRD (end stage renal disease)  Creatine stable for now. BMP reviewed- noted Estimated Creatinine Clearance: 24.1 mL/min (A) (based on SCr of 2.7 mg/dL (H)). according to latest data. Based on current GFR, CKD stage is stage 5 - GFR < 15.  Monitor UOP and serial BMP and adjust therapy as needed. Renally dose meds. Avoid nephrotoxic medications and procedures.      Final Active Diagnoses:    Diagnosis Date Noted POA    PRINCIPAL PROBLEM:  Hypokalemia [E87.6] 09/10/2020 Yes    Type 2 diabetes mellitus with hyperglycemia, with long-term current use of insulin, previous HbA1c 5.8% [E11.65, Z79.4] 01/27/2023 Not Applicable    Uncontrolled hypertension [I10] " 07/30/2022 Yes    ESRD (end stage renal disease) [N18.6] 07/22/2022 Yes      Problems Resolved During this Admission:       Discharged Condition: good    Disposition: Home or Self Care    Follow Up:   Follow-up Information       Melony Kim NP Follow up in 1 week(s).    Specialty: Family Medicine  Contact information:  [place] [id number]  [phone]                           Patient Instructions:      Diet renal     Notify your health care provider if you experience any of the following:  temperature >100.4     Notify your health care provider if you experience any of the following:  persistent nausea and vomiting or diarrhea     Notify your health care provider if you experience any of the following:  severe uncontrolled pain     Notify your health care provider if you experience any of the following:  difficulty breathing or increased cough     Notify your health care provider if you experience any of the following:  severe persistent headache     Notify your health care provider if you experience any of the following:  persistent dizziness, light-headedness, or visual disturbances     Notify your health care provider if you experience any of the following:  increased confusion or weakness     Activity as tolerated       Significant Diagnostic Studies: Labs: CMP   Recent Labs   Lab 12/05/24  1634 12/06/24  0440 12/06/24  1050    136  --    K 2.8* 4.2  --    CL 95 96  --    CO2 29 28  --    * 262* 29*   BUN 10 12  --    CREATININE 2.7* 4.0*  --    CALCIUM 9.1 9.8  --    PROT 7.6 6.9  --    ALBUMIN 3.5 3.1*  --    BILITOT 0.8 0.7  --    ALKPHOS 163* 152*  --    AST 19 21  --    ALT 19 18  --    ANIONGAP 13 12  --     and CBC   Recent Labs   Lab 12/05/24  1634 12/06/24  0440   WBC 5.61 4.34   HGB 8.2* 8.8*   HCT 24.8* 27.3*    188       Pending Diagnostic Studies:       None           Medications:  Reconciled Home Medications:      Medication List        CONTINUE taking  "these medications      ALPHAGAN P 0.15 % ophthalmic solution  Generic drug: brimonidine 0.15 % OPTH DROP  Place 1 drop into both eyes 3 (three) times daily.     apixaban 5 mg Tab  Commonly known as: ELIQUIS  Take 1 tablet (5 mg total) by mouth 2 (two) times daily. Patient w/ anemia, so held during admission, follow-up with hematologist about restarting     aspirin 325 MG tablet  Take 1 tablet (325 mg total) by mouth once daily.     atropine 1% 1 % Drop  Commonly known as: ISOPTO ATROPINE  Place 1 drop into the left eye 2 (two) times a day.     BD ULTRA-FINE MINI PEN NEEDLE 31 gauge x 3/16" Ndle  Generic drug: pen needle, diabetic  Use as directed with insulin once daily     blood-glucose meter Misc  Commonly known as: TRUE METRIX GLUCOSE METER  Use to check blood glucose     blood-glucose meter,continuous Misc  USE WITH SENSORS     brinzolamide 1 % ophthalmic suspension  Commonly known as: AZOPT  Place1 drop in left eye 3 times a day for 30 days     * busPIRone 10 MG tablet  Commonly known as: BUSPAR  Take 1 tablet (10 mg total) by mouth 3 (three) times daily as needed (anxiety).     * busPIRone 5 MG Tab  Commonly known as: BUSPAR  take 1 tablet by mouth daily     calcium acetate(phosphat bind) 667 mg capsule  Commonly known as: PHOSLO  take 2 capsules by mouth 3 times daily with meals     carvediloL 6.25 MG tablet  Commonly known as: COREG  Take 1 tablet (6.25 mg total) by mouth 2 (two) times a day.     cetirizine 10 MG tablet  Commonly known as: ZYRTEC  Take 1 tablet every day by oral route.     clopidogreL 75 mg tablet  Commonly known as: PLAVIX  take 1 tablet by mouth daily     * DEXCOM G7 SENSOR Heather  Generic drug: blood-glucose sensor  CHANGE EVERY 10 DAYS     * DEXCOM G7 SENSOR Heather  Generic drug: blood-glucose sensor  Use as directed for CGM. Change sensor every 10 days     dorzolamide-timolol 2-0.5% 22.3-6.8 mg/mL ophthalmic solution  Commonly known as: COSOPT  place 1 drop in left eye twice a day  for 30 " days     fluticasone propionate 50 mcg/actuation nasal spray  Commonly known as: FLONASE ALLERGY RELIEF  Spray 1 spray every day by intranasal route.     gabapentin 300 MG capsule  Commonly known as: NEURONTIN  Take 2 capsules twice a day by oral route for 90 days.     hydrALAZINE 50 MG tablet  Commonly known as: APRESOLINE  Take 1 tablet (50 mg total) by mouth every 8 (eight) hours.     * HYDROcodone-acetaminophen 5-325 mg per tablet  Commonly known as: NORCO  Take 1 tablet by mouth every 4 (four) hours as needed for Pain (flank pain).     * HYDROcodone-acetaminophen 7.5-325 mg per tablet  Commonly known as: NORCO  take 1 tablet by mouth 3 times daily as needed for pain, moderate to severe (4-10)     insulin aspart U-100 100 unit/mL (3 mL) Inpn pen  Commonly known as: NovoLOG  Inject 8 Units into the skin 3 (three) times daily with meals.     levETIRAcetam 500 MG Tab  Commonly known as: KEPPRA  Take 1 tablet (500 mg total) by mouth 2 (two) times a day.     mirtazapine 15 MG tablet  Commonly known as: REMERON  Take 1 tablet (15 mg total) by mouth once daily at bedtime     pantoprazole 40 MG tablet  Commonly known as: PROTONIX  Take 1 tablet (40 mg total) by mouth once daily.     prednisoLONE acetate 1 % Drps  Commonly known as: PRED FORTE  Place 1 drop into the left eye 2 (two) times daily.     RENVELA 800 mg Tab  Generic drug: sevelamer carbonate  Take 1 tablet (800 mg total) by mouth 3 (three) times daily with meals.     * rosuvastatin 20 MG tablet  Commonly known as: CRESTOR  take 1 tablet by mouth daily at bedtime     * rosuvastatin 5 MG tablet  Commonly known as: CRESTOR  Take 2 tablets (10 mg total) by mouth once daily at bedtime     sodium bicarbonate 650 MG tablet  Take 1 tablet (650 mg total) by mouth 3 (three) times daily.     sucralfate 1 gram tablet  Commonly known as: CARAFATE  Take 1 tablet (1 g total) by mouth 4 (four) times daily.     timolol maleate 0.5% 0.5 % Drop  Commonly known as:  TIMOPTIC  Place 1 drop in left eye 2 times a day for 30 days     TRUE METRIX GLUCOSE TEST STRIP Strp  Generic drug: blood sugar diagnostic  Use 1 strip to check blood glucose three times daily     TRUEPLUS LANCETS 33 gauge Misc  Generic drug: lancets  Use 1 to check blood glucose three times daily           * This list has 8 medication(s) that are the same as other medications prescribed for you. Read the directions carefully, and ask your doctor or other care provider to review them with you.                  Indwelling Lines/Drains at time of discharge:   Lines/Drains/Airways       Drain  Duration                  Hemodialysis AV Fistula Left upper arm -- days                    Time spent on the discharge of patient: 35 minutes         JACQUEILNE Santos  Department of Hospital Medicine  [facility]/Surg

## 2024-12-06 NOTE — H&P
[facility] Medicine  History & Physical    Patient Name: Tabby Howard  MRN: [id number]  Patient Class: OP- Observation  Admission Date: 2024  Attending Physician: Raymundo Parker MD   Primary Care Provider: Melony Kim NP         Patient information was obtained from patient, past medical records, and ER records.     Subjective:     Principal Problem:Hypokalemia    Chief Complaint:   Chief Complaint   Patient presents with    Abnormal Lab     Patient states she was at dialysis and they told her she needed a blood transfusion and she is having abdominal pain         HPI: Tabby Howard is a 34-year-old female who presented to the emergency room for evaluation of possible anemia requiring blood transfusion.  She reports she was told by her nephrologist to come to the ER after dialysis as she would need a blood transfusion due to hemoglobin of 6.  She has acute on chronic abdominal and back pain.  She denies fever chills.  No known sick contacts or travel.  She has a complex medical history including end-stage renal disease, gastroparesis, anemia, diabetes, and CHF.  ER workup: CBC with anemia of 8/24 (baseline).  CMP with potassium of 2.8 (repleted) and creatinine 2.7.  Patient will be admitted to Hospital Medicine for treatment and management.  Will monitor and replete electrolytes p.r.n..  Nephrology consult in a.m..                Past Medical History:   Diagnosis Date    ESRD on hemodialysis 2022    Gastritis 2022    EGD was 22    Gastroparesis 2022    has not had Emptying study    Heart failure with preserved ejection fraction 2022    EF 55% on 3/22    History of supraventricular tachycardia     Hyperkalemia 2022    Hypertensive emergency 2022    Sickle cell trait 2022    Type 2 diabetes mellitus        Past Surgical History:   Procedure Laterality Date     SECTION      x 3    COLONOSCOPY      COLONOSCOPY N/A 2022     Procedure: COLONOSCOPY;  Surgeon: Jagdeep Cedeno MD;  Location: [facility];  Service: Endoscopy;  Laterality: N/A;    DESTRUCTION, CILIARY BODY, USING LASER Left 03/08/2024    Procedure: Cyclophotocoagulation G-probe, retrobulbar chlorpromazine left eye;  Surgeon: Fidel Wise MD;  Location: [address];  Service: Ophthalmology;  Laterality: Left;    ENDOSCOPIC ULTRASOUND OF UPPER GASTROINTESTINAL TRACT N/A 12/16/2023    Procedure: ULTRASOUND, UPPER GI TRACT, ENDOSCOPIC;  Surgeon: Micky Paredes III, MD;  Location: [facility];  Service: Endoscopy;  Laterality: N/A;    ESOPHAGOGASTRODUODENOSCOPY N/A 10/18/2019    Procedure: ESOPHAGOGASTRODUODENOSCOPY (EGD);  Surgeon: Gianluca Mendez MD;  Location: [facility];  Service: Endoscopy;  Laterality: N/A;    ESOPHAGOGASTRODUODENOSCOPY N/A 08/24/2022    Procedure: EGD (ESOPHAGOGASTRODUODENOSCOPY);  Surgeon: Micky Paredes III, MD;  Location: [facility];  Service: Endoscopy;  Laterality: N/A;    ESOPHAGOGASTRODUODENOSCOPY N/A 12/05/2022    Procedure: EGD (ESOPHAGOGASTRODUODENOSCOPY);  Surgeon: Marcelo Zhong MD;  Location: [facility];  Service: Endoscopy;  Laterality: N/A;    ESOPHAGOGASTRODUODENOSCOPY N/A 9/13/2024    Procedure: EGD (ESOPHAGOGASTRODUODENOSCOPY);  Surgeon: Marcelo Zhong MD;  Location: [facility];  Service: Endoscopy;  Laterality: N/A;    EYE SURGERY      INSERTION, CATHETER, TUNNELED N/A 06/17/2023    Procedure: Insertion,catheter,tunneled;  Surgeon: Carlos Thurman Jr., MD;  Location: [facility];  Service: General;  Laterality: N/A;    LAPAROSCOPIC CHOLECYSTECTOMY N/A 07/30/2022    Procedure: CHOLECYSTECTOMY, LAPAROSCOPIC;  Surgeon: Grey Perez MD;  Location: [facility];  Service: General;  Laterality: N/A;    PLACEMENT OF DUAL-LUMEN VASCULAR CATHETER Left 07/12/2022    Procedure: INSERTION-CATHETER-JOSEPH;  Surgeon: Dionte Gan MD;  Location: [facility];  Service: General;  Laterality: Left;    PLACEMENT OF DUAL-LUMEN VASCULAR  "CATHETER Right 07/26/2022    Procedure: INSERTION-CATHETER-Hemosplit;  Surgeon: Dionte Gan MD;  Location: [facility];  Service: General;  Laterality: Right;       Review of patient's allergies indicates:   Allergen Reactions    Droperidol Hives    Haldol [haloperidol lactate] Hives    Penicillins Hives    Droperidol (bulk) Anxiety     STATES "FREAKS OUT".  TOLERATES HALDOL PRIOR TO ARRIVAL AT ANOTHER FACILITY TODAY.        No current facility-administered medications on file prior to encounter.     Current Outpatient Medications on File Prior to Encounter   Medication Sig    apixaban (ELIQUIS) 5 mg Tab Take 1 tablet (5 mg total) by mouth 2 (two) times daily. Patient w/ anemia, so held during admission, follow-up with hematologist about restarting    aspirin 325 MG tablet Take 1 tablet (325 mg total) by mouth once daily.    atropine 1% (ISOPTO ATROPINE) 1 % Drop Place 1 drop into the left eye 2 (two) times a day.    blood sugar diagnostic (TRUE METRIX GLUCOSE TEST STRIP) Strp Use 1 strip to check blood glucose three times daily    blood-glucose meter (TRUE METRIX GLUCOSE METER) Misc Use to check blood glucose    blood-glucose meter,continuous Misc USE WITH SENSORS    blood-glucose sensor (DEXCOM G7 SENSOR) Heather Use as directed for CGM. Change sensor every 10 days    blood-glucose sensor Heather CHANGE EVERY 10 DAYS    brimonidine 0.15 % OPTH DROP (ALPHAGAN) 0.15 % ophthalmic solution Place 1 drop into both eyes 3 (three) times daily.    brinzolamide (AZOPT) 1 % ophthalmic suspension Place1 drop in left eye 3 times a day for 30 days (Patient taking differently: Place 1 drop into the left eye 3 (three) times daily.)    busPIRone (BUSPAR) 10 MG tablet Take 1 tablet (10 mg total) by mouth 3 (three) times daily as needed (anxiety).    busPIRone (BUSPAR) 5 MG Tab take 1 tablet by mouth daily    calcium acetate,phosphat bind, (PHOSLO) 667 mg capsule take 2 capsules by mouth 3 times daily with meals    carvediloL (COREG) 6.25 " "MG tablet Take 1 tablet (6.25 mg total) by mouth 2 (two) times a day.    cetirizine (ZYRTEC) 10 MG tablet Take 1 tablet every day by oral route. (Patient taking differently: Take 10 mg by mouth once daily.)    clopidogreL (PLAVIX) 75 mg tablet take 1 tablet by mouth daily    dorzolamide-timolol 2-0.5% (COSOPT) 22.3-6.8 mg/mL ophthalmic solution place 1 drop in left eye twice a day  for 30 days (Patient taking differently: Place 1 drop into the left eye 2 (two) times daily.)    fluticasone propionate (FLONASE ALLERGY RELIEF) 50 mcg/actuation nasal spray Randolph 1 spray every day by intranasal route. (Patient taking differently: 1 spray by Each Nostril route Daily.)    gabapentin (NEURONTIN) 300 MG capsule Take 2 capsules twice a day by oral route for 90 days. (Patient taking differently: Take 600 mg by mouth 2 (two) times daily.)    hydrALAZINE (APRESOLINE) 50 MG tablet Take 1 tablet (50 mg total) by mouth every 8 (eight) hours.    HYDROcodone-acetaminophen (NORCO) 5-325 mg per tablet Take 1 tablet by mouth every 4 (four) hours as needed for Pain (flank pain).    HYDROcodone-acetaminophen (NORCO) 7.5-325 mg per tablet take 1 tablet by mouth 3 times daily as needed for pain, moderate to severe (4-10)    insulin aspart U-100 (NOVOLOG) 100 unit/mL (3 mL) InPn pen Inject 8 Units into the skin 3 (three) times daily with meals.    lancets (TRUEPLUS LANCETS) 33 gauge Misc Use 1 to check blood glucose three times daily    levETIRAcetam (KEPPRA) 500 MG Tab Take 1 tablet (500 mg total) by mouth 2 (two) times a day.    mirtazapine (REMERON) 15 MG tablet Take 1 tablet (15 mg total) by mouth once daily at bedtime    pantoprazole (PROTONIX) 40 MG tablet Take 1 tablet (40 mg total) by mouth once daily.    pen needle, diabetic 31 gauge x 3/16" Ndle Use as directed with insulin once daily    prednisoLONE acetate (PRED FORTE) 1 % DrpS Place 1 drop into the left eye 2 (two) times daily.    rosuvastatin (CRESTOR) 20 MG tablet take 1 " tablet by mouth daily at bedtime    rosuvastatin (CRESTOR) 5 MG tablet Take 2 tablets (10 mg total) by mouth once daily at bedtime    sevelamer carbonate (RENVELA) 800 mg Tab Take 1 tablet (800 mg total) by mouth 3 (three) times daily with meals.    sodium bicarbonate 650 MG tablet Take 1 tablet (650 mg total) by mouth 3 (three) times daily.    sucralfate (CARAFATE) 1 gram tablet Take 1 tablet (1 g total) by mouth 4 (four) times daily.    timolol maleate 0.5% (TIMOPTIC) 0.5 % Drop Place 1 drop in left eye 2 times a day for 30 days (Patient taking differently: Place 1 drop into the left eye 2 (two) times daily.)    [DISCONTINUED] albuterol (VENTOLIN HFA) 90 mcg/actuation inhaler Inhale 2 puffs every 4 hours by inhalation route.    [DISCONTINUED] atenoloL (TENORMIN) 50 MG tablet Take 1 tablet (50 mg total) by mouth every other day.    [DISCONTINUED] FLUoxetine 40 MG capsule Take 1 capsule every day by oral route.    [DISCONTINUED] insulin detemir U-100, Levemir, (LEVEMIR FLEXTOUCH U100 INSULIN) 100 unit/mL (3 mL) InPn pen Inject 22 units every day by subcutaneous route in the evening for 90 days.    [DISCONTINUED] omeprazole (PRILOSEC) 20 MG capsule Take 2 capsules (40 mg total) by mouth once daily. for 10 days     Family History       Problem Relation (Age of Onset)    Diabetes Mother, Father          Tobacco Use    Smoking status: Never    Smokeless tobacco: Never   Substance and Sexual Activity    Alcohol use: Not Currently    Drug use: No    Sexual activity: Not Currently     Partners: Male     Birth control/protection: I.U.D.     Review of Systems   Constitutional:  Positive for activity change. Negative for chills, diaphoresis and fever.   HENT:  Negative for congestion, nosebleeds and tinnitus.    Eyes:  Negative for photophobia and visual disturbance.   Respiratory:  Negative for cough, chest tightness, shortness of breath and wheezing.    Cardiovascular:  Negative for chest pain, palpitations and leg  swelling.   Gastrointestinal:  Positive for abdominal pain. Negative for abdominal distention, constipation, diarrhea, nausea and vomiting.   Endocrine: Negative for cold intolerance and heat intolerance.   Genitourinary:  Negative for difficulty urinating, dysuria, frequency, hematuria and urgency.   Musculoskeletal:  Positive for back pain. Negative for arthralgias and myalgias.   Skin:  Negative for pallor, rash and wound.   Allergic/Immunologic: Negative for immunocompromised state.   Neurological:  Negative for dizziness, tremors, facial asymmetry, speech difficulty and weakness.   Hematological:  Negative for adenopathy. Does not bruise/bleed easily.   Psychiatric/Behavioral:  Negative for confusion and sleep disturbance. The patient is not nervous/anxious.      Objective:     Vital Signs (Most Recent):  Temp: 98.4 °F (36.9 °C) (12/05/24 1609)  Pulse: 81 (12/05/24 2032)  Resp: 18 (12/05/24 2002)  BP: (!) 174/83 (12/05/24 2032)  SpO2: 98 % (12/05/24 1609) Vital Signs (24h Range):  Temp:  [98.4 °F (36.9 °C)] 98.4 °F (36.9 °C)  Pulse:  [78-85] 81  Resp:  [18-20] 18  SpO2:  [98 %] 98 %  BP: (126-187)/(83-97) 174/83     Weight: 53.1 kg (117 lb)  Body mass index is 20.73 kg/m².     Physical Exam  Vitals and nursing note reviewed.   Constitutional:       General: She is not in acute distress.     Appearance: She is well-developed. She is ill-appearing. She is not diaphoretic.   HENT:      Head: Normocephalic.      Mouth/Throat:      Mouth: Mucous membranes are dry.   Eyes:      General: No scleral icterus.     Conjunctiva/sclera: Conjunctivae normal.      Pupils: Pupils are equal, round, and reactive to light.   Neck:      Vascular: No JVD.   Cardiovascular:      Rate and Rhythm: Normal rate and regular rhythm.      Heart sounds: Normal heart sounds. No murmur heard.     No friction rub. No gallop.   Pulmonary:      Effort: Pulmonary effort is normal. No respiratory distress.      Breath sounds: Normal breath sounds.  No wheezing or rales.   Abdominal:      General: Bowel sounds are normal. There is no distension.      Palpations: Abdomen is soft.      Tenderness: There is no abdominal tenderness. There is no guarding or rebound.   Musculoskeletal:         General: No tenderness. Normal range of motion.      Cervical back: Normal range of motion and neck supple.   Lymphadenopathy:      Cervical: No cervical adenopathy.   Skin:     General: Skin is warm and dry.      Capillary Refill: Capillary refill takes less than 2 seconds.      Coloration: Skin is not pale.      Findings: No erythema or rash.   Neurological:      Mental Status: She is alert and oriented to person, place, and time.      Cranial Nerves: No cranial nerve deficit.      Sensory: No sensory deficit.      Coordination: Coordination normal.      Deep Tendon Reflexes: Reflexes normal.   Psychiatric:         Behavior: Behavior normal.         Thought Content: Thought content normal.         Judgment: Judgment normal.              CRANIAL NERVES     CN III, IV, VI   Pupils are equal, round, and reactive to light.       Significant Labs: All pertinent labs within the past 24 hours have been reviewed.  CBC:   Recent Labs   Lab 12/05/24  1634   WBC 5.61   HGB 8.2*   HCT 24.8*        CMP:   Recent Labs   Lab 12/05/24  1634      K 2.8*   CL 95   CO2 29   *   BUN 10   CREATININE 2.7*   CALCIUM 9.1   PROT 7.6   ALBUMIN 3.5   BILITOT 0.8   ALKPHOS 163*   AST 19   ALT 19   ANIONGAP 13       Significant Imaging: I have reviewed all pertinent imaging results/findings within the past 24 hours.  Assessment/Plan:     * Hypokalemia  Patient's most recent potassium results are listed below.   Recent Labs     12/05/24  1634   K 2.8*     Plan  - Replete potassium per protocol  - Monitor potassium Daily  - Patient's hypokalemia is stable  -     Type 2 diabetes mellitus with hyperglycemia, with long-term current use of insulin, previous HbA1c 5.8%  Chronic problem  "  Patient's FSGs are uncontrolled due to hyperglycemia on current medication regimen.  Last A1c reviewed-   Lab Results   Component Value Date    HGBA1C 9.0 (H) 09/10/2024     Most recent fingerstick glucose reviewed- No results for input(s): "POCTGLUCOSE" in the last 24 hours.  Current correctional scale  Medium  Maintain anti-hyperglycemic dose as follows-   Antihyperglycemics (From admission, onward)      Start     Stop Route Frequency Ordered    12/05/24 2141  insulin aspart U-100 pen 1-10 Units         -- SubQ Before meals & nightly PRN 12/05/24 2044          Hold Oral hypoglycemics while patient is in the hospital.       Uncontrolled hypertension  Patient's blood pressure range in the last 24 hours was: BP  Min: 126/85  Max: 187/86.The patient's inpatient anti-hypertensive regimen is listed below:  Current Antihypertensives       Plan  - BP is controlled, no changes needed to their regimen  -     ESRD (end stage renal disease)  Creatine stable for now. BMP reviewed- noted Estimated Creatinine Clearance: 24.1 mL/min (A) (based on SCr of 2.7 mg/dL (H)). according to latest data. Based on current GFR, CKD stage is stage 5 - GFR < 15.  Monitor UOP and serial BMP and adjust therapy as needed. Renally dose meds. Avoid nephrotoxic medications and procedures.      VTE Risk Mitigation (From admission, onward)           Ordered     Place JOCELYNE hose  Until discontinued         12/05/24 2044     IP VTE HIGH RISK PATIENT  Once         12/05/24 2044     Place sequential compression device  Until discontinued         12/05/24 2044                         On 12/05/2024, patient should be placed in hospital observation services under my care in collaboration with Dr. Parker.           Varun Dillard NP  Department of Hospital Medicine  [facility]          "

## 2024-12-06 NOTE — HOSPITAL COURSE
34 year old female admitted with hypokalemia.  Her potassium was replaced and she was monitored closely overnight.  Repeat CMP showed improvement in potassium.  H&H was stable and did not require transfusion.  She did have an episode of hypoglycemia, which resolved with dextrose.  She has glucometer at home and SSI.  Plan of care discussed with patient and she verbalized understanding.  Patient was seen and examined on the day of discharge.

## 2024-12-06 NOTE — ASSESSMENT & PLAN NOTE
Patient's blood pressure range in the last 24 hours was: BP  Min: 126/85  Max: 187/86.The patient's inpatient anti-hypertensive regimen is listed below:  Current Antihypertensives       Plan  - BP is controlled, no changes needed to their regimen  -

## 2024-12-06 NOTE — HPI
Tabby Howard is a 34-year-old female who presented to the emergency room for evaluation of possible anemia requiring blood transfusion.  She reports she was told by her nephrologist to come to the ER after dialysis as she would need a blood transfusion due to hemoglobin of 6.  She has acute on chronic abdominal and back pain.  She denies fever chills.  No known sick contacts or travel.  She has a complex medical history including end-stage renal disease, gastroparesis, anemia, diabetes, and CHF.  ER workup: CBC with anemia of 8/24 (baseline).  CMP with potassium of 2.8 (repleted) and creatinine 2.7.  Patient will be admitted to Hospital Medicine for treatment and management.  Will monitor and replete electrolytes p.r.n..  Nephrology consult in a.m..

## 2024-12-06 NOTE — PLAN OF CARE
reviewed the chart and spoke with the patient and the NP at the bedside. The patient will discharge home today with [facility]. Email sent to PCP requesting they contact patient to schedule hospital F/U. The patient stated she will call her dad to transport her home. There are no further CM needs.    The patient is now cleared from a CM standpoint to discharge home.       12/06/24 1107   Final Note   Assessment Type Final Discharge Note   Anticipated Discharge Disposition Home   Hospital Resources/Appts/Education Provided Appointments scheduled and added to AVS

## 2024-12-06 NOTE — CONSULTS
INPATIENT NEPHROLOGY CONSULT   [facility]    Patient Name: Tabby Howard  Date: 12/06/2024    Reason for consultation: ESRD    Chief Complaint:   Chief Complaint   Patient presents with    Abnormal Lab     Patient states she was at dialysis and they told her she needed a blood transfusion and she is having abdominal pain        History of Present Illness:  33 y/o F with ESRD on HD TTS who came into hospital after HD yest but our instruction due to outpt labs showing Hb 6.8- admit Hb actually > 8 fortunately- dealing with low blood glucose this AM but otherwise fine.    Interval History:  12/6- no acute transfusion needs- Hb 8.2 --> 8.8; high BP- hasn't gotten home BP meds    Plan of Care:    Assessment:  ESRD on HD TTS  HTN  Hypokalemia/HypoMg  SHPT  Anemia of CKD    Plan:    - continue HD TTS  - resume home BP meds   - low K immediately post HD- apparently got repletion- much better this AM- Mg also better  - resume binder with meals  - gets SHELLEY as outpatient    Thank you for allowing us to participate in this patient's care. We will continue to follow.    Vital Signs:  Temp Readings from Last 3 Encounters:   12/06/24 98.3 °F (36.8 °C) (Oral)   12/01/24 98.9 °F (37.2 °C) (Oral)   11/18/24 98.1 °F (36.7 °C)       Pulse Readings from Last 3 Encounters:   12/06/24 83   12/01/24 92   11/18/24 95       BP Readings from Last 3 Encounters:   12/06/24 (!) 159/91   12/01/24 (!) 184/105   11/18/24 130/62       Weight:  Wt Readings from Last 3 Encounters:   12/05/24 49.7 kg (109 lb 9.1 oz)   12/01/24 53.1 kg (117 lb)   11/17/24 54 kg (119 lb)       Past Medical & Surgical History:  Past Medical History:   Diagnosis Date    ESRD on hemodialysis 04/12/2022    Gastritis 12/2022    EGD was 12/5/22    Gastroparesis 04/12/2022    has not had Emptying study    Heart failure with preserved ejection fraction 04/12/2022    EF 55% on 3/22    History of supraventricular tachycardia     Hyperkalemia 07/07/2022     Hypertensive emergency 2022    Sickle cell trait 2022    Type 2 diabetes mellitus        Past Surgical History:   Procedure Laterality Date     SECTION      x 3    COLONOSCOPY      COLONOSCOPY N/A 2022    Procedure: COLONOSCOPY;  Surgeon: Jagdeep Cedeno MD;  Location: [facility];  Service: Endoscopy;  Laterality: N/A;    DESTRUCTION, CILIARY BODY, USING LASER Left 2024    Procedure: Cyclophotocoagulation G-probe, retrobulbar chlorpromazine left eye;  Surgeon: Fidel Wise MD;  Location: [address];  Service: Ophthalmology;  Laterality: Left;    ENDOSCOPIC ULTRASOUND OF UPPER GASTROINTESTINAL TRACT N/A 2023    Procedure: ULTRASOUND, UPPER GI TRACT, ENDOSCOPIC;  Surgeon: Micky Paredes III, MD;  Location: [facility];  Service: Endoscopy;  Laterality: N/A;    ESOPHAGOGASTRODUODENOSCOPY N/A 10/18/2019    Procedure: ESOPHAGOGASTRODUODENOSCOPY (EGD);  Surgeon: Gianluca Mendez MD;  Location: [facility];  Service: Endoscopy;  Laterality: N/A;    ESOPHAGOGASTRODUODENOSCOPY N/A 2022    Procedure: EGD (ESOPHAGOGASTRODUODENOSCOPY);  Surgeon: Micky Paredes III, MD;  Location: [facility];  Service: Endoscopy;  Laterality: N/A;    ESOPHAGOGASTRODUODENOSCOPY N/A 2022    Procedure: EGD (ESOPHAGOGASTRODUODENOSCOPY);  Surgeon: Marcelo Zhong MD;  Location[facility];  Service: Endoscopy;  Laterality: N/A;    ESOPHAGOGASTRODUODENOSCOPY N/A 2024    Procedure: EGD (ESOPHAGOGASTRODUODENOSCOPY);  Surgeon: Marcelo Zhong MD;  Location: [facility];  Service: Endoscopy;  Laterality: N/A;    EYE SURGERY      INSERTION, CATHETER, TUNNELED N/A 2023    Procedure: Insertion,catheter,tunneled;  Surgeon: Carlos Thurman Jr., MD;  Location: [facility];  Service: General;  Laterality: N/A;    LAPAROSCOPIC CHOLECYSTECTOMY N/A 2022    Procedure: CHOLECYSTECTOMY, LAPAROSCOPIC;  Surgeon: Grey Perez MD;  Location: [facility];  Service: General;  Laterality: N/A;     PLACEMENT OF DUAL-LUMEN VASCULAR CATHETER Left 07/12/2022    Procedure: INSERTION-CATHETER-JOSEPH;  Surgeon: Dionte Gan MD;  Location: [facility];  Service: General;  Laterality: Left;    PLACEMENT OF DUAL-LUMEN VASCULAR CATHETER Right 07/26/2022    Procedure: INSERTION-CATHETER-Hemosplit;  Surgeon: Dionte Gan MD;  Location: [facility];  Service: General;  Laterality: Right;       Past Social History:  Social History     Socioeconomic History    Marital status:    Tobacco Use    Smoking status: Never    Smokeless tobacco: Never   Substance and Sexual Activity    Alcohol use: Not Currently    Drug use: No    Sexual activity: Not Currently     Partners: Male     Birth control/protection: I.U.D.     Social Drivers of Health     Financial Resource Strain: High Risk (11/17/2024)    Overall Financial Resource Strain (CARDIA)     Difficulty of Paying Living Expenses: Hard   Food Insecurity: No Food Insecurity (11/17/2024)    Hunger Vital Sign     Worried About Running Out of Food in the Last Year: Never true     Ran Out of Food in the Last Year: Never true   Recent Concern: Food Insecurity - Food Insecurity Present (8/25/2024)    Hunger Vital Sign     Worried About Running Out of Food in the Last Year: Often true     Ran Out of Food in the Last Year: Often true   Transportation Needs: Unmet Transportation Needs (11/17/2024)    TRANSPORTATION NEEDS     Transportation : Yes, it has kept me from medical appointments or from getting my medications.   Physical Activity: Insufficiently Active (11/17/2024)    Exercise Vital Sign     Days of Exercise per Week: 7 days     Minutes of Exercise per Session: 20 min   Stress: Stress Concern Present (11/17/2024)    [employer] - Occupational Stress Questionnaire     Feeling of Stress : To some extent   Housing Stability: Low Risk  (11/17/2024)    Housing Stability Vital Sign     Unable to Pay for Housing in the Last Year: No     Homeless in  the Last Year: No   Recent Concern: Housing Stability - High Risk (10/23/2024)    Housing Stability Vital Sign     Unable to Pay for Housing in the Last Year: Yes     Homeless in the Last Year: No       Medications:  Scheduled Meds:  Continuous Infusions:  PRN Meds:.  Current Facility-Administered Medications:     acetaminophen, 650 mg, Oral, Q4H PRN    acetaminophen, 650 mg, Oral, Q6H PRN    albuterol-ipratropium, 3 mL, Nebulization, Q4H PRN    aluminum-magnesium hydroxide-simethicone, 30 mL, Oral, QID PRN    dextrose 10%, 12.5 g, Intravenous, PRN    dextrose 10%, 25 g, Intravenous, PRN    glucagon (human recombinant), 1 mg, Intramuscular, PRN    glucose, 16 g, Oral, PRN    glucose, 24 g, Oral, PRN    HYDROmorphone, 1 mg, Intravenous, Q4H PRN    insulin aspart U-100, 1-10 Units, Subcutaneous, QID (AC + HS) PRN    melatonin, 9 mg, Oral, Nightly PRN    naloxone, 0.02 mg, Intravenous, PRN    ondansetron, 4 mg, Intravenous, Q8H PRN    oxyCODONE, 5 mg, Oral, Q6H PRN    simethicone, 1 tablet, Oral, QID PRN    sodium chloride 0.9%, 10 mL, Intravenous, Q8H PRN  No current facility-administered medications on file prior to encounter.     Current Outpatient Medications on File Prior to Encounter   Medication Sig Dispense Refill    apixaban (ELIQUIS) 5 mg Tab Take 1 tablet (5 mg total) by mouth 2 (two) times daily. Patient w/ anemia, so held during admission, follow-up with hematologist about restarting      aspirin 325 MG tablet Take 1 tablet (325 mg total) by mouth once daily. 30 tablet 0    atropine 1% (ISOPTO ATROPINE) 1 % Drop Place 1 drop into the left eye 2 (two) times a day. 15 mL 3    blood sugar diagnostic (TRUE METRIX GLUCOSE TEST STRIP) Strp Use 1 strip to check blood glucose three times daily 100 each 11    blood-glucose meter (TRUE METRIX GLUCOSE METER) Misc Use to check blood glucose 1 each 0    blood-glucose meter,continuous Misc USE WITH SENSORS 1 each 0    blood-glucose sensor (DEXCOM G7 SENSOR) Heather Use as  directed for CGM. Change sensor every 10 days 3 each 0    blood-glucose sensor Heather CHANGE EVERY 10 DAYS 3 each 0    brimonidine 0.15 % OPTH DROP (ALPHAGAN) 0.15 % ophthalmic solution Place 1 drop into both eyes 3 (three) times daily. 15 mL 3    brinzolamide (AZOPT) 1 % ophthalmic suspension Place1 drop in left eye 3 times a day for 30 days (Patient taking differently: Place 1 drop into the left eye 3 (three) times daily.) 15 mL 6    busPIRone (BUSPAR) 10 MG tablet Take 1 tablet (10 mg total) by mouth 3 (three) times daily as needed (anxiety). 60 tablet 5    busPIRone (BUSPAR) 5 MG Tab take 1 tablet by mouth daily 30 tablet 0    calcium acetate,phosphat bind, (PHOSLO) 667 mg capsule take 2 capsules by mouth 3 times daily with meals 180 capsule 0    carvediloL (COREG) 6.25 MG tablet Take 1 tablet (6.25 mg total) by mouth 2 (two) times a day. 60 tablet 0    cetirizine (ZYRTEC) 10 MG tablet Take 1 tablet every day by oral route. (Patient taking differently: Take 10 mg by mouth once daily.) 30 tablet 0    clopidogreL (PLAVIX) 75 mg tablet take 1 tablet by mouth daily 30 tablet 0    dorzolamide-timolol 2-0.5% (COSOPT) 22.3-6.8 mg/mL ophthalmic solution place 1 drop in left eye twice a day  for 30 days (Patient taking differently: Place 1 drop into the left eye 2 (two) times daily.) 10 mL 0    fluticasone propionate (FLONASE ALLERGY RELIEF) 50 mcg/actuation nasal spray [place] 1 spray every day by intranasal route. (Patient taking differently: 1 spray by Each Nostril route Daily.) 16 g 2    gabapentin (NEURONTIN) 300 MG capsule Take 2 capsules twice a day by oral route for 90 days. (Patient taking differently: Take 600 mg by mouth 2 (two) times daily.) 360 capsule 1    hydrALAZINE (APRESOLINE) 50 MG tablet Take 1 tablet (50 mg total) by mouth every 8 (eight) hours.      HYDROcodone-acetaminophen (NORCO) 5-325 mg per tablet Take 1 tablet by mouth every 4 (four) hours as needed for Pain (flank pain). 20 tablet 0     "HYDROcodone-acetaminophen (NORCO) 7.5-325 mg per tablet take 1 tablet by mouth 3 times daily as needed for pain, moderate to severe (4-10) 10 tablet 0    insulin aspart U-100 (NOVOLOG) 100 unit/mL (3 mL) InPn pen Inject 8 Units into the skin 3 (three) times daily with meals. 15 mL 0    lancets (TRUEPLUS LANCETS) 33 gauge Misc Use 1 to check blood glucose three times daily 100 each 11    levETIRAcetam (KEPPRA) 500 MG Tab Take 1 tablet (500 mg total) by mouth 2 (two) times a day. 60 tablet 2    mirtazapine (REMERON) 15 MG tablet Take 1 tablet (15 mg total) by mouth once daily at bedtime 30 tablet 0    pantoprazole (PROTONIX) 40 MG tablet Take 1 tablet (40 mg total) by mouth once daily. 30 tablet 0    pen needle, diabetic 31 gauge x 3/16" Ndle Use as directed with insulin once daily 100 each 0    prednisoLONE acetate (PRED FORTE) 1 % DrpS Place 1 drop into the left eye 2 (two) times daily. 5 mL 3    rosuvastatin (CRESTOR) 20 MG tablet take 1 tablet by mouth daily at bedtime 30 tablet 0    rosuvastatin (CRESTOR) 5 MG tablet Take 2 tablets (10 mg total) by mouth once daily at bedtime 60 tablet 0    sevelamer carbonate (RENVELA) 800 mg Tab Take 1 tablet (800 mg total) by mouth 3 (three) times daily with meals. 180 tablet 11    sodium bicarbonate 650 MG tablet Take 1 tablet (650 mg total) by mouth 3 (three) times daily. 270 tablet 3    sucralfate (CARAFATE) 1 gram tablet Take 1 tablet (1 g total) by mouth 4 (four) times daily. 100 tablet 1    timolol maleate 0.5% (TIMOPTIC) 0.5 % Drop Place 1 drop in left eye 2 times a day for 30 days (Patient taking differently: Place 1 drop into the left eye 2 (two) times daily.) 5 mL 6    [DISCONTINUED] albuterol (VENTOLIN HFA) 90 mcg/actuation inhaler Inhale 2 puffs every 4 hours by inhalation route. 8 g 2    [DISCONTINUED] atenoloL (TENORMIN) 50 MG tablet Take 1 tablet (50 mg total) by mouth every other day. 45 tablet 3    [DISCONTINUED] FLUoxetine 40 MG capsule Take 1 capsule every " day by oral route. 30 capsule 2    [DISCONTINUED] insulin detemir U-100, Levemir, (LEVEMIR FLEXTOUCH U100 INSULIN) 100 unit/mL (3 mL) InPn pen Inject 22 units every day by subcutaneous route in the evening for 90 days. 18 mL 1    [DISCONTINUED] omeprazole (PRILOSEC) 20 MG capsule Take 2 capsules (40 mg total) by mouth once daily. for 10 days 20 capsule 0       Allergies:  Droperidol, Haldol [haloperidol lactate], Penicillins, and Droperidol (bulk)    Past Family History:  Reviewed; refer to Hospitalist Admission Note    Review of Systems:  Review of Systems - All 14 systems reviewed and negative, except as noted in HPI    Physical Exam:  General Appearance:    NAD, AAO x 3, cooperative, appears stated age   Head:    Normocephalic, atraumatic   Eyes:    PER, EOMI, and conjunctiva/sclera clear bilaterally       Mouth:   Moist mucus membranes, no thrush or oral lesions,       normal dentition   Back:     No CVA tenderness   Lungs:     Clear to auscultation bilaterally, no wheezes, crackles,           rales or rhonchi, symmetric air movement, respirations unlabored   Chest wall:    No tenderness or deformity   Heart:    Regular rate and rhythm, S1 and S2 normal, no murmur, rub   or gallop   Abdomen:     Soft, non-tender, non-distended, bowel sounds active all four   quadrants, no RT or guarding, no masses, no organomegaly   Extremities:   Warm and well perfused, distal pulses are intact, no             cyanosis or peripheral edema   MSK:   No joint or muscle swelling, tenderness or deformity   Skin:   Skin color, texture, turgor normal, no rashes or lesions   Neurologic/Psychiatric:   CNII-XII intact, normal strength and sensation       throughout, no asterixis; normal affect, memory, judgement     and insight      Results:  Lab Results   Component Value Date     12/06/2024    K 4.2 12/06/2024    CL 96 12/06/2024    CO2 28 12/06/2024    BUN 12 12/06/2024    CREATININE 4.0 (H) 12/06/2024    CALCIUM 9.8 12/06/2024     ANIONGAP 12 12/06/2024    ESTGFRAFRICA 19 (L) 09/03/2022    EGFRNONAA 18 (A) 07/31/2022       Lab Results   Component Value Date    CALCIUM 9.8 12/06/2024    PHOS 3.2 12/06/2024       Recent Labs   Lab 12/06/24  0440   WBC 4.34   RBC 3.03*   HGB 8.8*   HCT 27.3*      MCV 90   MCH 29.0   MCHC 32.2       I have personally reviewed pertinent radiological imaging and reports from this admission.    I have spent > 70 minutes providing care for this patient for the above diagnoses. These services have included chart/data/imaging review, evaluation, exam, formulation of plan, , note preparation, and discussions with staff involved in this patient's care.    Prateek Clark MD MPH  [facility]  [address] [place] [id number]  [phone] (p)  [phone] (f)

## 2024-12-07 ENCOUNTER — HOSPITAL ENCOUNTER (EMERGENCY)
Facility: HOSPITAL | Age: 34
Discharge: HOME | End: 2024-12-07
Attending: EMERGENCY MEDICINE
Payer: MEDICAID

## 2024-12-07 VITALS
RESPIRATION RATE: 19 BRPM | DIASTOLIC BLOOD PRESSURE: 94 MMHG | SYSTOLIC BLOOD PRESSURE: 155 MMHG | OXYGEN SATURATION: 100 % | HEIGHT: 62 IN | WEIGHT: 117 LBS | BODY MASS INDEX: 21.53 KG/M2 | HEART RATE: 88 BPM | TEMPERATURE: 98 F

## 2024-12-07 DIAGNOSIS — G89.29 CHRONIC ABDOMINAL PAIN: Primary | ICD-10-CM

## 2024-12-07 DIAGNOSIS — R10.9 CHRONIC ABDOMINAL PAIN: Primary | ICD-10-CM

## 2024-12-07 LAB
ALBUMIN SERPL BCP-MCNC: 3.7 G/DL (ref 3.5–5.2)
ALP SERPL-CCNC: 171 U/L (ref 40–150)
ALT SERPL W/O P-5'-P-CCNC: 15 U/L (ref 10–44)
ANION GAP SERPL CALC-SCNC: 17 MMOL/L (ref 8–16)
AST SERPL-CCNC: 19 U/L (ref 10–40)
BASOPHILS # BLD AUTO: 0.05 K/UL (ref 0–0.2)
BASOPHILS NFR BLD: 0.9 % (ref 0–1.9)
BILIRUB SERPL-MCNC: 0.9 MG/DL (ref 0.1–1)
BUN SERPL-MCNC: 23 MG/DL (ref 6–20)
CALCIUM SERPL-MCNC: 10.3 MG/DL (ref 8.7–10.5)
CHLORIDE SERPL-SCNC: 94 MMOL/L (ref 95–110)
CO2 SERPL-SCNC: 27 MMOL/L (ref 23–29)
CREAT SERPL-MCNC: 6.5 MG/DL (ref 0.5–1.4)
DIFFERENTIAL METHOD BLD: ABNORMAL
EOSINOPHIL # BLD AUTO: 0.1 K/UL (ref 0–0.5)
EOSINOPHIL NFR BLD: 2.4 % (ref 0–8)
ERYTHROCYTE [DISTWIDTH] IN BLOOD BY AUTOMATED COUNT: 17.2 % (ref 11.5–14.5)
EST. GFR  (NO RACE VARIABLE): 8 ML/MIN/1.73 M^2
GLUCOSE SERPL-MCNC: 350 MG/DL (ref 70–110)
HCG INTACT+B SERPL-ACNC: <1.2 MIU/ML
HCT VFR BLD AUTO: 26.2 % (ref 37–48.5)
HGB BLD-MCNC: 8.8 G/DL (ref 12–16)
IMM GRANULOCYTES # BLD AUTO: 0.02 K/UL (ref 0–0.04)
IMM GRANULOCYTES NFR BLD AUTO: 0.3 % (ref 0–0.5)
LIPASE SERPL-CCNC: 20 U/L (ref 4–60)
LYMPHOCYTES # BLD AUTO: 0.8 K/UL (ref 1–4.8)
LYMPHOCYTES NFR BLD: 12.8 % (ref 18–48)
MCH RBC QN AUTO: 28.9 PG (ref 27–31)
MCHC RBC AUTO-ENTMCNC: 33.6 G/DL (ref 32–36)
MCV RBC AUTO: 86 FL (ref 82–98)
MONOCYTES # BLD AUTO: 0.6 K/UL (ref 0.3–1)
MONOCYTES NFR BLD: 9.9 % (ref 4–15)
NEUTROPHILS # BLD AUTO: 4.3 K/UL (ref 1.8–7.7)
NEUTROPHILS NFR BLD: 73.7 % (ref 38–73)
NRBC BLD-RTO: 0 /100 WBC
PLATELET # BLD AUTO: 190 K/UL (ref 150–450)
PMV BLD AUTO: 10.2 FL (ref 9.2–12.9)
POTASSIUM SERPL-SCNC: 5.1 MMOL/L (ref 3.5–5.1)
PROT SERPL-MCNC: 7.8 G/DL (ref 6–8.4)
RBC # BLD AUTO: 3.04 M/UL (ref 4–5.4)
SODIUM SERPL-SCNC: 138 MMOL/L (ref 136–145)
WBC # BLD AUTO: 5.84 K/UL (ref 3.9–12.7)

## 2024-12-07 PROCEDURE — 63600175 PHARM REV CODE 636 W HCPCS: Performed by: EMERGENCY MEDICINE

## 2024-12-07 PROCEDURE — 96375 TX/PRO/DX INJ NEW DRUG ADDON: CPT

## 2024-12-07 PROCEDURE — 80053 COMPREHEN METABOLIC PANEL: CPT | Performed by: EMERGENCY MEDICINE

## 2024-12-07 PROCEDURE — 99284 EMERGENCY DEPT VISIT MOD MDM: CPT | Mod: 25

## 2024-12-07 PROCEDURE — 84702 CHORIONIC GONADOTROPIN TEST: CPT | Performed by: EMERGENCY MEDICINE

## 2024-12-07 PROCEDURE — 85025 COMPLETE CBC W/AUTO DIFF WBC: CPT | Performed by: EMERGENCY MEDICINE

## 2024-12-07 PROCEDURE — 83690 ASSAY OF LIPASE: CPT | Performed by: EMERGENCY MEDICINE

## 2024-12-07 PROCEDURE — 96374 THER/PROPH/DIAG INJ IV PUSH: CPT

## 2024-12-07 RX ORDER — HYDROMORPHONE HYDROCHLORIDE 1 MG/ML
1 INJECTION, SOLUTION INTRAMUSCULAR; INTRAVENOUS; SUBCUTANEOUS
Status: COMPLETED | OUTPATIENT
Start: 2024-12-07 | End: 2024-12-07

## 2024-12-07 RX ORDER — DIPHENHYDRAMINE HYDROCHLORIDE 50 MG/ML
12.5 INJECTION INTRAMUSCULAR; INTRAVENOUS
Status: COMPLETED | OUTPATIENT
Start: 2024-12-07 | End: 2024-12-07

## 2024-12-07 RX ORDER — HALOPERIDOL 5 MG/ML
2 INJECTION INTRAMUSCULAR
Status: COMPLETED | OUTPATIENT
Start: 2024-12-07 | End: 2024-12-07

## 2024-12-07 RX ADMIN — DIPHENHYDRAMINE HYDROCHLORIDE 12.5 MG: 50 INJECTION INTRAMUSCULAR; INTRAVENOUS at 11:12

## 2024-12-07 RX ADMIN — HYDROMORPHONE HYDROCHLORIDE 1 MG: 1 INJECTION, SOLUTION INTRAMUSCULAR; INTRAVENOUS; SUBCUTANEOUS at 11:12

## 2024-12-07 RX ADMIN — HALOPERIDOL LACTATE 2 MG: 5 INJECTION, SOLUTION INTRAMUSCULAR at 12:12

## 2024-12-07 NOTE — NURSING
Notified by nurse that pt CBG is less than 24, second check 25, pt awake and alert, reports she just feels sugar is low, stat glucose ordered, lab called and made aware, message sent to Natividad ELIZONDO, stated to give PRN IV dextrose 10 bolus, medication given as rx'd, primary nurse made aware

## 2024-12-07 NOTE — ED PROVIDER NOTES
"   History     Chief Complaint   Patient presents with    Flank Pain     Pt presents to ED via EMS reporting 10/10 lumbar and flank pain that started at last night. Pt reports that she does not remember what time that the pain started. Pt appears to be at baseline. Pt had a dialysis appointment this AM, but the pt states that they would not treat her since she was in pain.     Back Pain     HPI:  Tabby Howard is a 34 y.o. female with PMH as below who presents to the [facility] emergency department for evaluation of acute on chronic left flank abdominal pain identical to several prior episodes which have all been alleviated by Dilaudid and haldol. She was refused dialysis because they wanted her pain controlled before treatment. She has no other complaints.       PCP: Melony Kim NP    Review of patient's allergies indicates:   Allergen Reactions    Droperidol Hives    Haldol [haloperidol lactate] Hives    Penicillins Hives    Droperidol (bulk) Anxiety     STATES "FREAKS OUT".  TOLERATES HALDOL PRIOR TO ARRIVAL AT ANOTHER FACILITY TODAY.       Past Medical History:   Diagnosis Date    ESRD on hemodialysis 2022    Gastritis 2022    EGD was 22    Gastroparesis 2022    has not had Emptying study    Heart failure with preserved ejection fraction 2022    EF 55% on 3/22    History of supraventricular tachycardia     Hyperkalemia 2022    Hypertensive emergency 2022    Sickle cell trait 2022    Type 2 diabetes mellitus      Past Surgical History:   Procedure Laterality Date     SECTION      x 3    COLONOSCOPY      COLONOSCOPY N/A 2022    Procedure: COLONOSCOPY;  Surgeon: Jagdeep Cedeno MD;  Location: [facility];  Service: Endoscopy;  Laterality: N/A;    DESTRUCTION, CILIARY BODY, USING LASER Left 2024    Procedure: Cyclophotocoagulation G-probe, retrobulbar chlorpromazine left eye;  Surgeon: Fidel Wise MD;  [facility];  " Service: Ophthalmology;  Laterality: Left;    ENDOSCOPIC ULTRASOUND OF UPPER GASTROINTESTINAL TRACT N/A 12/16/2023    Procedure: ULTRASOUND, UPPER GI TRACT, ENDOSCOPIC;  Surgeon: Micky Paredes III, MD;  Location: [facility];  Service: Endoscopy;  Laterality: N/A;    ESOPHAGOGASTRODUODENOSCOPY N/A 10/18/2019    Procedure: ESOPHAGOGASTRODUODENOSCOPY (EGD);  Surgeon: Gianluca Mendez MD;  Location: [facility];  Service: Endoscopy;  Laterality: N/A;    ESOPHAGOGASTRODUODENOSCOPY N/A 08/24/2022    Procedure: EGD (ESOPHAGOGASTRODUODENOSCOPY);  Surgeon: Micky Paredes III, MD;  Location: [facility];  Service: Endoscopy;  Laterality: N/A;    ESOPHAGOGASTRODUODENOSCOPY N/A 12/05/2022    Procedure: EGD (ESOPHAGOGASTRODUODENOSCOPY);  Surgeon: Marcelo Zhong MD;  Location: [facility];  Service: Endoscopy;  Laterality: N/A;    ESOPHAGOGASTRODUODENOSCOPY N/A 9/13/2024    Procedure: EGD (ESOPHAGOGASTRODUODENOSCOPY);  Surgeon: Marcelo Zhong MD;  Location: [facility];  Service: Endoscopy;  Laterality: N/A;    EYE SURGERY      INSERTION, CATHETER, TUNNELED N/A 06/17/2023    Procedure: Insertion,catheter,tunneled;  Surgeon: Carlos Thurman Jr., MD;  Location: [facility];  Service: General;  Laterality: N/A;    LAPAROSCOPIC CHOLECYSTECTOMY N/A 07/30/2022    Procedure: CHOLECYSTECTOMY, LAPAROSCOPIC;  Surgeon: Grey Perez MD;  Location: [facility] OR;  Service: General;  Laterality: N/A;    PLACEMENT OF DUAL-LUMEN VASCULAR CATHETER Left 07/12/2022    Procedure: INSERTION-CATHETER-JOSEPH;  Surgeon: Dionte Gan MD;  Location: [facility] OR;  Service: General;  Laterality: Left;    PLACEMENT OF DUAL-LUMEN VASCULAR CATHETER Right 07/26/2022    Procedure: INSERTION-CATHETER-Hemosplit;  Surgeon: Dionte Gan MD;  Location: [facility] OR;  Service: General;  Laterality: Right;       Family History   Problem Relation Name Age of Onset    Diabetes Mother      Diabetes Father       Social History     Tobacco Use    Smoking status: Never  "   Smokeless tobacco: Never   Substance and Sexual Activity    Alcohol use: Not Currently    Drug use: No    Sexual activity: Not Currently     Partners: Male     Birth control/protection: I.U.D.      Review of Systems     Review of Systems   Constitutional: Negative.  Negative for fever.   HENT: Negative.     Eyes: Negative.    Respiratory: Negative.     Cardiovascular: Negative.    Gastrointestinal: Negative.    Endocrine: Negative.    Genitourinary: Negative.         Chronically anuric   Musculoskeletal: Negative.    Skin: Negative.    Allergic/Immunologic: Negative.    Neurological: Negative.    Hematological: Negative.    Psychiatric/Behavioral: Negative.     All other systems reviewed and are negative.       Physical Exam     Initial Vitals [12/07/24 0952]   BP Pulse Resp Temp SpO2   (!) 190/95 94 20 97.9 °F (36.6 °C) 99 %      MAP       --          Nursing notes and vital signs reviewed.  Constitutional: Patient is in severe distress.   Head: Normocephalic. Atraumatic.   Eyes:  Conjunctivae are not pale. No scleral icterus.   ENT: Mucous membranes moist.   Neck: Supple.   Cardiovascular: Regular rate. Regular rhythm.   Pulmonary: No respiratory distress.   Abdominal: Soft. Nondistended. Left anterior abdominal tenderness.   Musculoskeletal: Moves all extremities. No obvious deformities.   Skin: Warm and dry.   Neurological:  Alert, awake, and appropriate. Normal speech. No acute lateralizing neurologic deficits appreciated.   Psychiatric: Normal affect.       ED Course   Procedures  Vitals:    12/07/24 0952   BP: (!) 190/95   Pulse: 94   Resp: 20   Temp: 97.9 °F (36.6 °C)   TempSrc: Oral   SpO2: 99%   Weight: 53.1 kg (117 lb)   Height: 5' 2" (1.575 m)     Lab Results Interpreted as Abnormal:  Labs Reviewed   CBC W/ AUTO DIFFERENTIAL - Abnormal       Result Value    WBC 5.84      RBC 3.04 (*)     Hemoglobin 8.8 (*)     Hematocrit 26.2 (*)     MCV 86      MCH 28.9      MCHC 33.6      RDW 17.2 (*)     Platelets " 190      MPV 10.2      Immature Granulocytes 0.3      Gran # (ANC) 4.3      Immature Grans (Abs) 0.02      Lymph # 0.8 (*)     Mono # 0.6      Eos # 0.1      Baso # 0.05      nRBC 0      Gran % 73.7 (*)     Lymph % 12.8 (*)     Mono % 9.9      Eosinophil % 2.4      Basophil % 0.9      Differential Method Automated     COMPREHENSIVE METABOLIC PANEL - Abnormal    Sodium 138      Potassium 5.1      Chloride 94 (*)     CO2 27      Glucose 350 (*)     BUN 23 (*)     Creatinine 6.5 (*)     Calcium 10.3      Total Protein 7.8      Albumin 3.7      Total Bilirubin 0.9      Alkaline Phosphatase 171 (*)     AST 19      ALT 15      eGFR 8.0 (*)     Anion Gap 17 (*)    LIPASE    Lipase 20     HCG, QUANTITATIVE   HCG, QUANTITATIVE    HCG Quant <1.2        All Lab Results:  Results for orders placed or performed during the hospital encounter of 12/07/24   CBC auto differential    Collection Time: 12/07/24 10:12 AM   Result Value Ref Range    WBC 5.84 3.90 - 12.70 K/uL    RBC 3.04 (L) 4.00 - 5.40 M/uL    Hemoglobin 8.8 (L) 12.0 - 16.0 g/dL    Hematocrit 26.2 (L) 37.0 - 48.5 %    MCV 86 82 - 98 fL    MCH 28.9 27.0 - 31.0 pg    MCHC 33.6 32.0 - 36.0 g/dL    RDW 17.2 (H) 11.5 - 14.5 %    Platelets 190 150 - 450 K/uL    MPV 10.2 9.2 - 12.9 fL    Immature Granulocytes 0.3 0.0 - 0.5 %    Gran # (ANC) 4.3 1.8 - 7.7 K/uL    Immature Grans (Abs) 0.02 0.00 - 0.04 K/uL    Lymph # 0.8 (L) 1.0 - 4.8 K/uL    Mono # 0.6 0.3 - 1.0 K/uL    Eos # 0.1 0.0 - 0.5 K/uL    Baso # 0.05 0.00 - 0.20 K/uL    nRBC 0 0 /100 WBC    Gran % 73.7 (H) 38.0 - 73.0 %    Lymph % 12.8 (L) 18.0 - 48.0 %    Mono % 9.9 4.0 - 15.0 %    Eosinophil % 2.4 0.0 - 8.0 %    Basophil % 0.9 0.0 - 1.9 %    Differential Method Automated    Comprehensive metabolic panel    Collection Time: 12/07/24 10:12 AM   Result Value Ref Range    Sodium 138 136 - 145 mmol/L    Potassium 5.1 3.5 - 5.1 mmol/L    Chloride 94 (L) 95 - 110 mmol/L    CO2 27 23 - 29 mmol/L    Glucose 350 (H) 70 - 110  mg/dL    BUN 23 (H) 6 - 20 mg/dL    Creatinine 6.5 (H) 0.5 - 1.4 mg/dL    Calcium 10.3 8.7 - 10.5 mg/dL    Total Protein 7.8 6.0 - 8.4 g/dL    Albumin 3.7 3.5 - 5.2 g/dL    Total Bilirubin 0.9 0.1 - 1.0 mg/dL    Alkaline Phosphatase 171 (H) 40 - 150 U/L    AST 19 10 - 40 U/L    ALT 15 10 - 44 U/L    eGFR 8.0 (A) >60 mL/min/1.73 m^2    Anion Gap 17 (H) 8 - 16 mmol/L   Lipase    Collection Time: 12/07/24 10:12 AM   Result Value Ref Range    Lipase 20 4 - 60 U/L   HCG, Quantitative    Collection Time: 12/07/24 10:12 AM   Result Value Ref Range    HCG Quant <1.2 See Text mIU/mL     Imaging Results    None          The emergency physician reviewed the vital signs / test results outlined above.     ED Discussion      Patient's evaluation in the ED does not suggest any emergent or life-threatening medical conditions requiring immediate intervention beyond what was provided in the ED, and I believe patient is safe for discharge. Regardless, an unremarkable evaluation in the ED does not preclude the development or presence of a serious or life-threatening condition. As such, patient was given return instructions for any change or worsening of symptoms.       ED Medication(s) Administered:  Medications   HYDROmorphone injection 1 mg (has no administration in time range)   haloperidol lactate injection 2 mg (has no administration in time range)   diphenhydrAMINE injection 12.5 mg (has no administration in time range)       Prescription Management: I performed a review of the patient's current Rx medication list as input by nursing staff.    Patient's Medications   New Prescriptions    No medications on file   Previous Medications    APIXABAN (ELIQUIS) 5 MG TAB    Take 1 tablet (5 mg total) by mouth 2 (two) times daily. Patient w/ anemia, so held during admission, follow-up with hematologist about restarting    ASPIRIN 325 MG TABLET    Take 1 tablet (325 mg total) by mouth once daily.    ATROPINE 1% (ISOPTO ATROPINE) 1 % DROP     Place 1 drop into the left eye 2 (two) times a day.    BLOOD SUGAR DIAGNOSTIC (TRUE METRIX GLUCOSE TEST STRIP) STRP    Use 1 strip to check blood glucose three times daily    BLOOD-GLUCOSE METER (TRUE METRIX GLUCOSE METER) MISC    Use to check blood glucose    BLOOD-GLUCOSE METER,CONTINUOUS MISC    USE WITH SENSORS    BLOOD-GLUCOSE SENSOR (DEXCOM G7 SENSOR) ELIZABETH    Use as directed for CGM. Change sensor every 10 days    BLOOD-GLUCOSE SENSOR ELIZABETH    CHANGE EVERY 10 DAYS    BRIMONIDINE 0.15 % OPTH DROP (ALPHAGAN) 0.15 % OPHTHALMIC SOLUTION    Place 1 drop into both eyes 3 (three) times daily.    BRINZOLAMIDE (AZOPT) 1 % OPHTHALMIC SUSPENSION    Place1 drop in left eye 3 times a day for 30 days    BUSPIRONE (BUSPAR) 10 MG TABLET    Take 1 tablet (10 mg total) by mouth 3 (three) times daily as needed (anxiety).    BUSPIRONE (BUSPAR) 5 MG TAB    take 1 tablet by mouth daily    CALCIUM ACETATE,PHOSPHAT BIND, (PHOSLO) 667 MG CAPSULE    take 2 capsules by mouth 3 times daily with meals    CARVEDILOL (COREG) 6.25 MG TABLET    Take 1 tablet (6.25 mg total) by mouth 2 (two) times a day.    CETIRIZINE (ZYRTEC) 10 MG TABLET    Take 1 tablet every day by oral route.    CLOPIDOGREL (PLAVIX) 75 MG TABLET    take 1 tablet by mouth daily    DORZOLAMIDE-TIMOLOL 2-0.5% (COSOPT) 22.3-6.8 MG/ML OPHTHALMIC SOLUTION    place 1 drop in left eye twice a day  for 30 days    FLUTICASONE PROPIONATE (FLONASE ALLERGY RELIEF) 50 MCG/ACTUATION NASAL SPRAY    [place] 1 spray every day by intranasal route.    GABAPENTIN (NEURONTIN) 300 MG CAPSULE    Take 2 capsules twice a day by oral route for 90 days.    HYDRALAZINE (APRESOLINE) 50 MG TABLET    Take 1 tablet (50 mg total) by mouth every 8 (eight) hours.    HYDROCODONE-ACETAMINOPHEN (NORCO) 5-325 MG PER TABLET    Take 1 tablet by mouth every 4 (four) hours as needed for Pain (flank pain).    HYDROCODONE-ACETAMINOPHEN (NORCO) 7.5-325 MG PER TABLET    take 1 tablet by mouth 3 times daily as needed for  "pain, moderate to severe (4-10)    INSULIN ASPART U-100 (NOVOLOG) 100 UNIT/ML (3 ML) INPN PEN    Inject 8 Units into the skin 3 (three) times daily with meals.    LANCETS (TRUEPLUS LANCETS) 33 GAUGE MISC    Use 1 to check blood glucose three times daily    LEVETIRACETAM (KEPPRA) 500 MG TAB    Take 1 tablet (500 mg total) by mouth 2 (two) times a day.    MIRTAZAPINE (REMERON) 15 MG TABLET    Take 1 tablet (15 mg total) by mouth once daily at bedtime    PANTOPRAZOLE (PROTONIX) 40 MG TABLET    Take 1 tablet (40 mg total) by mouth once daily.    PEN NEEDLE, DIABETIC 31 GAUGE X 3/16" NDLE    Use as directed with insulin once daily    PREDNISOLONE ACETATE (PRED FORTE) 1 % DRPS    Place 1 drop into the left eye 2 (two) times daily.    ROSUVASTATIN (CRESTOR) 20 MG TABLET    take 1 tablet by mouth daily at bedtime    ROSUVASTATIN (CRESTOR) 5 MG TABLET    Take 2 tablets (10 mg total) by mouth once daily at bedtime    SEVELAMER CARBONATE (RENVELA) 800 MG TAB    Take 1 tablet (800 mg total) by mouth 3 (three) times daily with meals.    SODIUM BICARBONATE 650 MG TABLET    Take 1 tablet (650 mg total) by mouth 3 (three) times daily.    SUCRALFATE (CARAFATE) 1 GRAM TABLET    Take 1 tablet (1 g total) by mouth 4 (four) times daily.    TIMOLOL MALEATE 0.5% (TIMOPTIC) 0.5 % DROP    Place 1 drop in left eye 2 times a day for 30 days   Modified Medications    No medications on file   Discontinued Medications    No medications on file         Follow-up Information       Melony Kim NP. Schedule an appointment as soon as possible for a visit in 2 days.    Specialty: Family Medicine  Contact information:  [address] [facility] [address]  [phone]               [facility] - Emergency Dept.    Specialty: Emergency Medicine  Why: As needed, If symptoms worsen  Contact information:  [address] [facility] [address] [phone]                          Clinical Impression       ICD-10-CM ICD-9-CM   1. " Chronic abdominal pain  R10.9 789.00    G89.29 338.29      ED Disposition Condition    Discharge Stable             Akin Flores MD  12/07/24 1108

## 2024-12-24 ENCOUNTER — HOSPITAL ENCOUNTER (OUTPATIENT)
Facility: HOSPITAL | Age: 34
Discharge: HOME | End: 2024-12-25
Attending: EMERGENCY MEDICINE | Admitting: STUDENT IN AN ORGANIZED HEALTH CARE EDUCATION/TRAINING PROGRAM
Payer: MEDICAID

## 2024-12-24 DIAGNOSIS — Z99.2 ESRD ON DIALYSIS: Primary | ICD-10-CM

## 2024-12-24 DIAGNOSIS — E87.5 HYPERKALEMIA: ICD-10-CM

## 2024-12-24 DIAGNOSIS — R07.9 CHEST PAIN: ICD-10-CM

## 2024-12-24 DIAGNOSIS — N18.6 ESRD ON DIALYSIS: Primary | ICD-10-CM

## 2024-12-24 DIAGNOSIS — D64.9 ANEMIA, UNSPECIFIED TYPE: ICD-10-CM

## 2024-12-24 DIAGNOSIS — E87.6 HYPOKALEMIA: ICD-10-CM

## 2024-12-24 PROBLEM — R10.9 ABDOMINAL PAIN: Status: RESOLVED | Noted: 2022-10-26 | Resolved: 2024-12-24

## 2024-12-24 PROBLEM — I82.90 DEEP VEIN THROMBOSIS (DVT) OF NON-EXTREMITY VEIN: Chronic | Status: RESOLVED | Noted: 2022-07-26 | Resolved: 2024-12-24

## 2024-12-24 PROBLEM — H40.9: Status: RESOLVED | Noted: 2024-02-25 | Resolved: 2024-12-24

## 2024-12-24 PROBLEM — N30.90 CYSTITIS: Status: RESOLVED | Noted: 2024-01-25 | Resolved: 2024-12-24

## 2024-12-24 PROBLEM — D63.1 ANEMIA DUE TO CHRONIC KIDNEY DISEASE, ON CHRONIC DIALYSIS: Status: RESOLVED | Noted: 2022-10-26 | Resolved: 2024-12-24

## 2024-12-24 LAB
ABO + RH BLD: NORMAL
ALBUMIN SERPL BCP-MCNC: 3.9 G/DL (ref 3.5–5.2)
ALBUMIN SERPL BCP-MCNC: 4.2 G/DL (ref 3.5–5.2)
ALP SERPL-CCNC: 196 U/L (ref 40–150)
ALT SERPL W/O P-5'-P-CCNC: 24 U/L (ref 10–44)
ANION GAP SERPL CALC-SCNC: 21 MMOL/L (ref 8–16)
ANION GAP SERPL CALC-SCNC: 22 MMOL/L (ref 8–16)
AST SERPL-CCNC: 19 U/L (ref 10–40)
BASOPHILS # BLD AUTO: 0 K/UL (ref 0–0.2)
BASOPHILS # BLD AUTO: 0.02 K/UL (ref 0–0.2)
BASOPHILS NFR BLD: 0 % (ref 0–1.9)
BASOPHILS NFR BLD: 0.4 % (ref 0–1.9)
BILIRUB SERPL-MCNC: 1.7 MG/DL (ref 0.1–1)
BLD GP AB SCN CELLS X3 SERPL QL: NORMAL
BLD PROD TYP BPU: NORMAL
BLD PROD TYP BPU: NORMAL
BLOOD UNIT EXPIRATION DATE: NORMAL
BLOOD UNIT EXPIRATION DATE: NORMAL
BLOOD UNIT TYPE CODE: 6200
BLOOD UNIT TYPE CODE: 6200
BLOOD UNIT TYPE: NORMAL
BLOOD UNIT TYPE: NORMAL
BUN SERPL-MCNC: 105 MG/DL (ref 6–20)
BUN SERPL-MCNC: 32 MG/DL (ref 6–20)
CALCIUM SERPL-MCNC: 8.6 MG/DL (ref 8.7–10.5)
CALCIUM SERPL-MCNC: 9.7 MG/DL (ref 8.7–10.5)
CHLORIDE SERPL-SCNC: 106 MMOL/L (ref 95–110)
CHLORIDE SERPL-SCNC: 95 MMOL/L (ref 95–110)
CO2 SERPL-SCNC: 12 MMOL/L (ref 23–29)
CO2 SERPL-SCNC: 23 MMOL/L (ref 23–29)
CODING SYSTEM: NORMAL
CODING SYSTEM: NORMAL
CREAT SERPL-MCNC: 16.9 MG/DL (ref 0.5–1.4)
CREAT SERPL-MCNC: 5.5 MG/DL (ref 0.5–1.4)
CROSSMATCH INTERPRETATION: NORMAL
CROSSMATCH INTERPRETATION: NORMAL
DIFFERENTIAL METHOD BLD: ABNORMAL
DIFFERENTIAL METHOD BLD: ABNORMAL
DISPENSE STATUS: NORMAL
DISPENSE STATUS: NORMAL
EOSINOPHIL # BLD AUTO: 0 K/UL (ref 0–0.5)
EOSINOPHIL # BLD AUTO: 0 K/UL (ref 0–0.5)
EOSINOPHIL NFR BLD: 0 % (ref 0–8)
EOSINOPHIL NFR BLD: 0.8 % (ref 0–8)
ERYTHROCYTE [DISTWIDTH] IN BLOOD BY AUTOMATED COUNT: 15.4 % (ref 11.5–14.5)
ERYTHROCYTE [DISTWIDTH] IN BLOOD BY AUTOMATED COUNT: 17.2 % (ref 11.5–14.5)
EST. GFR  (NO RACE VARIABLE): 10 ML/MIN/1.73 M^2
EST. GFR  (NO RACE VARIABLE): 3 ML/MIN/1.73 M^2
GLUCOSE SERPL-MCNC: 118 MG/DL (ref 70–110)
GLUCOSE SERPL-MCNC: 305 MG/DL (ref 70–110)
HCT VFR BLD AUTO: 18 % (ref 37–48.5)
HCT VFR BLD AUTO: 23.9 % (ref 37–48.5)
HGB BLD-MCNC: 5.6 G/DL (ref 12–16)
HGB BLD-MCNC: 8.4 G/DL (ref 12–16)
IMM GRANULOCYTES # BLD AUTO: 0.04 K/UL (ref 0–0.04)
IMM GRANULOCYTES # BLD AUTO: 0.05 K/UL (ref 0–0.04)
IMM GRANULOCYTES NFR BLD AUTO: 0.8 % (ref 0–0.5)
IMM GRANULOCYTES NFR BLD AUTO: 1.1 % (ref 0–0.5)
LYMPHOCYTES # BLD AUTO: 0.3 K/UL (ref 1–4.8)
LYMPHOCYTES # BLD AUTO: 0.3 K/UL (ref 1–4.8)
LYMPHOCYTES NFR BLD: 6.4 % (ref 18–48)
LYMPHOCYTES NFR BLD: 6.8 % (ref 18–48)
MCH RBC QN AUTO: 28.3 PG (ref 27–31)
MCH RBC QN AUTO: 29.4 PG (ref 27–31)
MCHC RBC AUTO-ENTMCNC: 31.1 G/DL (ref 32–36)
MCHC RBC AUTO-ENTMCNC: 35.1 G/DL (ref 32–36)
MCV RBC AUTO: 84 FL (ref 82–98)
MCV RBC AUTO: 91 FL (ref 82–98)
MONOCYTES # BLD AUTO: 0.2 K/UL (ref 0.3–1)
MONOCYTES # BLD AUTO: 0.2 K/UL (ref 0.3–1)
MONOCYTES NFR BLD: 3.8 % (ref 4–15)
MONOCYTES NFR BLD: 4.4 % (ref 4–15)
NEUTROPHILS # BLD AUTO: 4.1 K/UL (ref 1.8–7.7)
NEUTROPHILS # BLD AUTO: 4.2 K/UL (ref 1.8–7.7)
NEUTROPHILS NFR BLD: 87.2 % (ref 38–73)
NEUTROPHILS NFR BLD: 88.3 % (ref 38–73)
NRBC BLD-RTO: 0 /100 WBC
NRBC BLD-RTO: 0 /100 WBC
NUM UNITS TRANS PACKED RBC: NORMAL
NUM UNITS TRANS PACKED RBC: NORMAL
PHOSPHATE SERPL-MCNC: 3 MG/DL (ref 2.7–4.5)
PLATELET # BLD AUTO: 51 K/UL (ref 150–450)
PLATELET # BLD AUTO: 59 K/UL (ref 150–450)
PLATELET BLD QL SMEAR: ABNORMAL
PMV BLD AUTO: 11.4 FL (ref 9.2–12.9)
PMV BLD AUTO: 9.9 FL (ref 9.2–12.9)
POCT GLUCOSE: 129 MG/DL (ref 70–110)
POTASSIUM SERPL-SCNC: 3.1 MMOL/L (ref 3.5–5.1)
POTASSIUM SERPL-SCNC: 7.1 MMOL/L (ref 3.5–5.1)
PROT SERPL-MCNC: 7.5 G/DL (ref 6–8.4)
RBC # BLD AUTO: 1.98 M/UL (ref 4–5.4)
RBC # BLD AUTO: 2.86 M/UL (ref 4–5.4)
SODIUM SERPL-SCNC: 139 MMOL/L (ref 136–145)
SODIUM SERPL-SCNC: 140 MMOL/L (ref 136–145)
SPECIMEN OUTDATE: NORMAL
WBC # BLD AUTO: 4.68 K/UL (ref 3.9–12.7)
WBC # BLD AUTO: 4.81 K/UL (ref 3.9–12.7)

## 2024-12-24 PROCEDURE — 93010 ELECTROCARDIOGRAM REPORT: CPT | Mod: ,,, | Performed by: INTERNAL MEDICINE

## 2024-12-24 PROCEDURE — 94760 N-INVAS EAR/PLS OXIMETRY 1: CPT

## 2024-12-24 PROCEDURE — 25000003 PHARM REV CODE 250: Performed by: INTERNAL MEDICINE

## 2024-12-24 PROCEDURE — 96375 TX/PRO/DX INJ NEW DRUG ADDON: CPT

## 2024-12-24 PROCEDURE — 86900 BLOOD TYPING SEROLOGIC ABO: CPT | Performed by: EMERGENCY MEDICINE

## 2024-12-24 PROCEDURE — 25000003 PHARM REV CODE 250: Performed by: STUDENT IN AN ORGANIZED HEALTH CARE EDUCATION/TRAINING PROGRAM

## 2024-12-24 PROCEDURE — 90935 HEMODIALYSIS ONE EVALUATION: CPT

## 2024-12-24 PROCEDURE — 99291 CRITICAL CARE FIRST HOUR: CPT

## 2024-12-24 PROCEDURE — 36556 INSERT NON-TUNNEL CV CATH: CPT

## 2024-12-24 PROCEDURE — P9016 RBC LEUKOCYTES REDUCED: HCPCS | Performed by: EMERGENCY MEDICINE

## 2024-12-24 PROCEDURE — 25000242 PHARM REV CODE 250 ALT 637 W/ HCPCS: Performed by: EMERGENCY MEDICINE

## 2024-12-24 PROCEDURE — 25000003 PHARM REV CODE 250: Mod: JZ,JG | Performed by: EMERGENCY MEDICINE

## 2024-12-24 PROCEDURE — 63600175 PHARM REV CODE 636 W HCPCS: Performed by: STUDENT IN AN ORGANIZED HEALTH CARE EDUCATION/TRAINING PROGRAM

## 2024-12-24 PROCEDURE — 85025 COMPLETE CBC W/AUTO DIFF WBC: CPT | Performed by: EMERGENCY MEDICINE

## 2024-12-24 PROCEDURE — 80069 RENAL FUNCTION PANEL: CPT | Performed by: STUDENT IN AN ORGANIZED HEALTH CARE EDUCATION/TRAINING PROGRAM

## 2024-12-24 PROCEDURE — 36415 COLL VENOUS BLD VENIPUNCTURE: CPT | Performed by: STUDENT IN AN ORGANIZED HEALTH CARE EDUCATION/TRAINING PROGRAM

## 2024-12-24 PROCEDURE — 94640 AIRWAY INHALATION TREATMENT: CPT

## 2024-12-24 PROCEDURE — 96366 THER/PROPH/DIAG IV INF ADDON: CPT

## 2024-12-24 PROCEDURE — 86920 COMPATIBILITY TEST SPIN: CPT | Performed by: EMERGENCY MEDICINE

## 2024-12-24 PROCEDURE — G0257 UNSCHED DIALYSIS ESRD PT HOS: HCPCS

## 2024-12-24 PROCEDURE — 93005 ELECTROCARDIOGRAM TRACING: CPT

## 2024-12-24 PROCEDURE — 36430 TRANSFUSION BLD/BLD COMPNT: CPT

## 2024-12-24 PROCEDURE — G0378 HOSPITAL OBSERVATION PER HR: HCPCS

## 2024-12-24 PROCEDURE — 85025 COMPLETE CBC W/AUTO DIFF WBC: CPT | Mod: 91 | Performed by: STUDENT IN AN ORGANIZED HEALTH CARE EDUCATION/TRAINING PROGRAM

## 2024-12-24 PROCEDURE — 80053 COMPREHEN METABOLIC PANEL: CPT | Performed by: EMERGENCY MEDICINE

## 2024-12-24 PROCEDURE — 36415 COLL VENOUS BLD VENIPUNCTURE: CPT | Performed by: EMERGENCY MEDICINE

## 2024-12-24 PROCEDURE — 96365 THER/PROPH/DIAG IV INF INIT: CPT

## 2024-12-24 RX ORDER — DIPHENHYDRAMINE HCL 25 MG
25 CAPSULE ORAL EVERY 6 HOURS PRN
Status: DISCONTINUED | OUTPATIENT
Start: 2024-12-24 | End: 2024-12-25 | Stop reason: HOSPADM

## 2024-12-24 RX ORDER — MORPHINE SULFATE 4 MG/ML
4 INJECTION, SOLUTION INTRAMUSCULAR; INTRAVENOUS
Status: COMPLETED | OUTPATIENT
Start: 2024-12-24 | End: 2024-12-24

## 2024-12-24 RX ORDER — IBUPROFEN 200 MG
24 TABLET ORAL
Status: DISCONTINUED | OUTPATIENT
Start: 2024-12-24 | End: 2024-12-25 | Stop reason: HOSPADM

## 2024-12-24 RX ORDER — MUPIROCIN 20 MG/G
OINTMENT TOPICAL 2 TIMES DAILY
Status: DISCONTINUED | OUTPATIENT
Start: 2024-12-24 | End: 2024-12-25 | Stop reason: HOSPADM

## 2024-12-24 RX ORDER — ATORVASTATIN CALCIUM 40 MG/1
40 TABLET, FILM COATED ORAL DAILY
Status: DISCONTINUED | OUTPATIENT
Start: 2024-12-24 | End: 2024-12-25 | Stop reason: HOSPADM

## 2024-12-24 RX ORDER — SEVELAMER CARBONATE 800 MG/1
800 TABLET, FILM COATED ORAL
Status: DISCONTINUED | OUTPATIENT
Start: 2024-12-24 | End: 2024-12-25 | Stop reason: HOSPADM

## 2024-12-24 RX ORDER — GABAPENTIN 300 MG/1
600 CAPSULE ORAL 2 TIMES DAILY
Status: DISCONTINUED | OUTPATIENT
Start: 2024-12-24 | End: 2024-12-25 | Stop reason: HOSPADM

## 2024-12-24 RX ORDER — DORZOLAMIDE HYDROCHLORIDE AND TIMOLOL MALEATE 20; 5 MG/ML; MG/ML
1 SOLUTION/ DROPS OPHTHALMIC 2 TIMES DAILY
Status: DISCONTINUED | OUTPATIENT
Start: 2024-12-24 | End: 2024-12-25 | Stop reason: HOSPADM

## 2024-12-24 RX ORDER — INSULIN GLARGINE 100 [IU]/ML
18 INJECTION, SOLUTION SUBCUTANEOUS NIGHTLY
COMMUNITY

## 2024-12-24 RX ORDER — BUSPIRONE HYDROCHLORIDE 5 MG/1
5 TABLET ORAL DAILY
Status: DISCONTINUED | OUTPATIENT
Start: 2024-12-24 | End: 2024-12-25 | Stop reason: HOSPADM

## 2024-12-24 RX ORDER — BRINZOLAMIDE 10 MG/ML
1 SUSPENSION/ DROPS OPHTHALMIC 3 TIMES DAILY
Status: DISCONTINUED | OUTPATIENT
Start: 2024-12-24 | End: 2024-12-25 | Stop reason: HOSPADM

## 2024-12-24 RX ORDER — SODIUM CHLORIDE 9 MG/ML
INJECTION, SOLUTION INTRAVENOUS
Status: DISCONTINUED | OUTPATIENT
Start: 2024-12-24 | End: 2024-12-25 | Stop reason: HOSPADM

## 2024-12-24 RX ORDER — INDOMETHACIN 25 MG/1
50 CAPSULE ORAL ONCE
Status: COMPLETED | OUTPATIENT
Start: 2024-12-24 | End: 2024-12-24

## 2024-12-24 RX ORDER — LEVETIRACETAM 500 MG/1
500 TABLET ORAL 2 TIMES DAILY
Status: DISCONTINUED | OUTPATIENT
Start: 2024-12-24 | End: 2024-12-25 | Stop reason: HOSPADM

## 2024-12-24 RX ORDER — ALBUTEROL SULFATE 2.5 MG/.5ML
10 SOLUTION RESPIRATORY (INHALATION)
Status: COMPLETED | OUTPATIENT
Start: 2024-12-24 | End: 2024-12-24

## 2024-12-24 RX ORDER — ACETAMINOPHEN 325 MG/1
650 TABLET ORAL EVERY 8 HOURS PRN
Status: DISCONTINUED | OUTPATIENT
Start: 2024-12-24 | End: 2024-12-25 | Stop reason: HOSPADM

## 2024-12-24 RX ORDER — ATROPINE SULFATE 10 MG/ML
1 SOLUTION/ DROPS OPHTHALMIC 2 TIMES DAILY
Status: DISCONTINUED | OUTPATIENT
Start: 2024-12-24 | End: 2024-12-25 | Stop reason: HOSPADM

## 2024-12-24 RX ORDER — SODIUM CHLORIDE 9 MG/ML
INJECTION, SOLUTION INTRAVENOUS ONCE
Status: DISCONTINUED | OUTPATIENT
Start: 2024-12-24 | End: 2024-12-24

## 2024-12-24 RX ORDER — GLUCAGON 1 MG
1 KIT INJECTION
Status: DISCONTINUED | OUTPATIENT
Start: 2024-12-24 | End: 2024-12-25 | Stop reason: HOSPADM

## 2024-12-24 RX ORDER — CALCIUM ACETATE 667 MG/1
1334 CAPSULE ORAL
Status: DISCONTINUED | OUTPATIENT
Start: 2024-12-24 | End: 2024-12-25 | Stop reason: HOSPADM

## 2024-12-24 RX ORDER — TALC
6 POWDER (GRAM) TOPICAL NIGHTLY PRN
Status: DISCONTINUED | OUTPATIENT
Start: 2024-12-24 | End: 2024-12-25 | Stop reason: HOSPADM

## 2024-12-24 RX ORDER — HYDRALAZINE HYDROCHLORIDE 25 MG/1
50 TABLET, FILM COATED ORAL EVERY 8 HOURS
Status: DISCONTINUED | OUTPATIENT
Start: 2024-12-24 | End: 2024-12-25 | Stop reason: HOSPADM

## 2024-12-24 RX ORDER — CALCIUM GLUCONATE 20 MG/ML
1 INJECTION, SOLUTION INTRAVENOUS
Status: COMPLETED | OUTPATIENT
Start: 2024-12-24 | End: 2024-12-24

## 2024-12-24 RX ORDER — HYDROCODONE BITARTRATE AND ACETAMINOPHEN 7.5; 325 MG/1; MG/1
1 TABLET ORAL EVERY 6 HOURS PRN
Status: DISCONTINUED | OUTPATIENT
Start: 2024-12-24 | End: 2024-12-25 | Stop reason: HOSPADM

## 2024-12-24 RX ORDER — PANTOPRAZOLE SODIUM 40 MG/1
40 TABLET, DELAYED RELEASE ORAL DAILY
Status: DISCONTINUED | OUTPATIENT
Start: 2024-12-24 | End: 2024-12-25 | Stop reason: HOSPADM

## 2024-12-24 RX ORDER — SODIUM CHLORIDE 0.9 % (FLUSH) 0.9 %
10 SYRINGE (ML) INJECTION
Status: DISCONTINUED | OUTPATIENT
Start: 2024-12-24 | End: 2024-12-25 | Stop reason: HOSPADM

## 2024-12-24 RX ORDER — CLOPIDOGREL BISULFATE 75 MG/1
75 TABLET ORAL DAILY
Status: DISCONTINUED | OUTPATIENT
Start: 2024-12-24 | End: 2024-12-24

## 2024-12-24 RX ORDER — HYDROCODONE BITARTRATE AND ACETAMINOPHEN 500; 5 MG/1; MG/1
TABLET ORAL
Status: DISCONTINUED | OUTPATIENT
Start: 2024-12-24 | End: 2024-12-25 | Stop reason: HOSPADM

## 2024-12-24 RX ORDER — NALOXONE HCL 0.4 MG/ML
0.02 VIAL (ML) INJECTION
Status: DISCONTINUED | OUTPATIENT
Start: 2024-12-24 | End: 2024-12-25 | Stop reason: HOSPADM

## 2024-12-24 RX ORDER — SODIUM BICARBONATE 650 MG/1
650 TABLET ORAL 3 TIMES DAILY
Status: DISCONTINUED | OUTPATIENT
Start: 2024-12-24 | End: 2024-12-25 | Stop reason: HOSPADM

## 2024-12-24 RX ORDER — INSULIN ASPART 100 [IU]/ML
0-10 INJECTION, SOLUTION INTRAVENOUS; SUBCUTANEOUS
Status: DISCONTINUED | OUTPATIENT
Start: 2024-12-24 | End: 2024-12-25 | Stop reason: HOSPADM

## 2024-12-24 RX ORDER — IBUPROFEN 200 MG
16 TABLET ORAL
Status: DISCONTINUED | OUTPATIENT
Start: 2024-12-24 | End: 2024-12-25 | Stop reason: HOSPADM

## 2024-12-24 RX ORDER — CARVEDILOL 6.25 MG/1
6.25 TABLET ORAL 2 TIMES DAILY
Status: DISCONTINUED | OUTPATIENT
Start: 2024-12-24 | End: 2024-12-25 | Stop reason: HOSPADM

## 2024-12-24 RX ORDER — ASPIRIN 325 MG
325 TABLET ORAL DAILY
Status: DISCONTINUED | OUTPATIENT
Start: 2024-12-24 | End: 2024-12-24

## 2024-12-24 RX ADMIN — SODIUM BICARBONATE 650 MG TABLET 650 MG: at 09:12

## 2024-12-24 RX ADMIN — DIPHENHYDRAMINE HYDROCHLORIDE 25 MG: 25 CAPSULE ORAL at 03:12

## 2024-12-24 RX ADMIN — GABAPENTIN 600 MG: 300 CAPSULE ORAL at 09:12

## 2024-12-24 RX ADMIN — MELATONIN TAB 3 MG 6 MG: 3 TAB at 09:12

## 2024-12-24 RX ADMIN — CALCIUM GLUCONATE 1 G: 20 INJECTION, SOLUTION INTRAVENOUS at 07:12

## 2024-12-24 RX ADMIN — CALCIUM ACETATE 1334 MG: 667 CAPSULE ORAL at 11:12

## 2024-12-24 RX ADMIN — DORZOLAMIDE HYDROCHLORIDE AND TIMOLOL MALEATE 1 DROP: 20; 5 SOLUTION/ DROPS OPHTHALMIC at 10:12

## 2024-12-24 RX ADMIN — GABAPENTIN 600 MG: 300 CAPSULE ORAL at 11:12

## 2024-12-24 RX ADMIN — CALCIUM ACETATE 1334 MG: 667 CAPSULE ORAL at 03:12

## 2024-12-24 RX ADMIN — DORZOLAMIDE HYDROCHLORIDE AND TIMOLOL MALEATE 1 DROP: 20; 5 SOLUTION/ DROPS OPHTHALMIC at 11:12

## 2024-12-24 RX ADMIN — LEVETIRACETAM 500 MG: 500 TABLET, FILM COATED ORAL at 11:12

## 2024-12-24 RX ADMIN — LEVETIRACETAM 500 MG: 500 TABLET, FILM COATED ORAL at 09:12

## 2024-12-24 RX ADMIN — SEVELAMER CARBONATE 800 MG: 800 TABLET, FILM COATED ORAL at 11:12

## 2024-12-24 RX ADMIN — HYDROCODONE BITARTRATE AND ACETAMINOPHEN 1 TABLET: 7.5; 325 TABLET ORAL at 09:12

## 2024-12-24 RX ADMIN — ASPIRIN 325 MG: 325 TABLET ORAL at 11:12

## 2024-12-24 RX ADMIN — APIXABAN 5 MG: 2.5 TABLET, FILM COATED ORAL at 11:12

## 2024-12-24 RX ADMIN — SODIUM BICARBONATE 50 MEQ: 84 INJECTION, SOLUTION INTRAVENOUS at 07:12

## 2024-12-24 RX ADMIN — BUSPIRONE HYDROCHLORIDE 5 MG: 5 TABLET ORAL at 11:12

## 2024-12-24 RX ADMIN — ATORVASTATIN CALCIUM 40 MG: 40 TABLET, FILM COATED ORAL at 11:12

## 2024-12-24 RX ADMIN — ALBUTEROL SULFATE 10 MG: 2.5 SOLUTION RESPIRATORY (INHALATION) at 06:12

## 2024-12-24 RX ADMIN — SEVELAMER CARBONATE 800 MG: 800 TABLET, FILM COATED ORAL at 03:12

## 2024-12-24 RX ADMIN — MORPHINE SULFATE 4 MG: 4 INJECTION INTRAVENOUS at 07:12

## 2024-12-24 RX ADMIN — BRINZOLAMIDE 1 DROP: 10 SUSPENSION/ DROPS OPHTHALMIC at 11:12

## 2024-12-24 RX ADMIN — ATROPINE SULFATE 1 DROP: 10 SOLUTION OPHTHALMIC at 10:12

## 2024-12-24 RX ADMIN — CARVEDILOL 6.25 MG: 6.25 TABLET, FILM COATED ORAL at 12:12

## 2024-12-24 RX ADMIN — CLOPIDOGREL BISULFATE 75 MG: 75 TABLET, FILM COATED ORAL at 11:12

## 2024-12-24 RX ADMIN — BRINZOLAMIDE 1 DROP: 10 SUSPENSION/ DROPS OPHTHALMIC at 10:12

## 2024-12-24 RX ADMIN — DIPHENHYDRAMINE HYDROCHLORIDE 25 MG: 25 CAPSULE ORAL at 09:12

## 2024-12-24 RX ADMIN — MUPIROCIN 1 G: 20 OINTMENT TOPICAL at 11:12

## 2024-12-24 RX ADMIN — HYDROCODONE BITARTRATE AND ACETAMINOPHEN 1 TABLET: 7.5; 325 TABLET ORAL at 11:12

## 2024-12-24 RX ADMIN — SODIUM ZIRCONIUM CYCLOSILICATE 10 G: 10 POWDER, FOR SUSPENSION ORAL at 07:12

## 2024-12-24 RX ADMIN — HYDRALAZINE HYDROCHLORIDE 50 MG: 25 TABLET ORAL at 09:12

## 2024-12-24 RX ADMIN — MUPIROCIN 1 G: 20 OINTMENT TOPICAL at 09:12

## 2024-12-24 RX ADMIN — PANTOPRAZOLE SODIUM 40 MG: 40 TABLET, DELAYED RELEASE ORAL at 11:12

## 2024-12-24 RX ADMIN — SODIUM BICARBONATE 650 MG TABLET 650 MG: at 11:12

## 2024-12-24 RX ADMIN — ATROPINE SULFATE 1 DROP: 10 SOLUTION OPHTHALMIC at 12:12

## 2024-12-24 RX ADMIN — CARVEDILOL 6.25 MG: 6.25 TABLET, FILM COATED ORAL at 09:12

## 2024-12-24 NOTE — ED NOTES
Report called and spoke with SHAILA Sue. Dressing reinforced to central line insertion site. Bleeding controlled.

## 2024-12-24 NOTE — Clinical Note
Diagnosis: ESRD on dialysis [059963]   Future Attending Provider: LUIS HAMPTON [[id number]]   Admit to which facility:: [facility] [[id number]]   Reason for IP Medical Treatment  (Clinical interventions that can only be accomplished in the IP setting? ) :: HD   Plans for Post-Acute care--if anticipated (pick the single best option):: A. No post acute care anticipated at this time

## 2024-12-24 NOTE — SUBJECTIVE & OBJECTIVE
Past Medical History:   Diagnosis Date    ESRD on hemodialysis 2022    Gastritis 2022    EGD was 22    Gastroparesis 2022    has not had Emptying study    Heart failure with preserved ejection fraction 2022    EF 55% on 3/22    History of supraventricular tachycardia     Hyperkalemia 2022    Hypertensive emergency 2022    Sickle cell trait 2022    Type 2 diabetes mellitus        Past Surgical History:   Procedure Laterality Date     SECTION      x 3    COLONOSCOPY      COLONOSCOPY N/A 2022    Procedure: COLONOSCOPY;  Surgeon: Jagdeep Cedeno MD;  Location: [facility];  Service: Endoscopy;  Laterality: N/A;    DESTRUCTION, CILIARY BODY, USING LASER Left 2024    Procedure: Cyclophotocoagulation G-probe, retrobulbar chlorpromazine left eye;  Surgeon: Fidel Wise MD;  Location: [address];  Service: Ophthalmology;  Laterality: Left;    ENDOSCOPIC ULTRASOUND OF UPPER GASTROINTESTINAL TRACT N/A 2023    Procedure: ULTRASOUND, UPPER GI TRACT, ENDOSCOPIC;  Surgeon: Micky Paredes III, MD;  Location: [facility];  Service: Endoscopy;  Laterality: N/A;    ESOPHAGOGASTRODUODENOSCOPY N/A 10/18/2019    Procedure: ESOPHAGOGASTRODUODENOSCOPY (EGD);  Surgeon: Gianluca Mendez MD;  Location: [facility];  Service: Endoscopy;  Laterality: N/A;    ESOPHAGOGASTRODUODENOSCOPY N/A 2022    Procedure: EGD (ESOPHAGOGASTRODUODENOSCOPY);  Surgeon: Micky Paredes III, MD;  Location: [facility];  Service: Endoscopy;  Laterality: N/A;    ESOPHAGOGASTRODUODENOSCOPY N/A 2022    Procedure: EGD (ESOPHAGOGASTRODUODENOSCOPY);  Surgeon: Marcelo Zhong MD;  Location: [facility];  Service: Endoscopy;  Laterality: N/A;    ESOPHAGOGASTRODUODENOSCOPY N/A 2024    Procedure: EGD (ESOPHAGOGASTRODUODENOSCOPY);  Surgeon: Marcelo Zhong MD;  Location: [facility];  Service: Endoscopy;  Laterality: N/A;    EYE SURGERY      INSERTION, CATHETER, TUNNELED N/A  "06/17/2023    Procedure: Insertion,catheter,tunneled;  Surgeon: Carlos Thurman Jr., MD;  Location: [facility] [place] [facility];  Service: General;  Laterality: N/A;    LAPAROSCOPIC CHOLECYSTECTOMY N/A 07/30/2022    Procedure: CHOLECYSTECTOMY, LAPAROSCOPIC;  Surgeon: Grey Perez MD;  Location: [facility] [place] [facility];  Service: General;  Laterality: N/A;    PLACEMENT OF DUAL-LUMEN VASCULAR CATHETER Left 07/12/2022    Procedure: INSERTION-CATHETER-JOSEPH;  Surgeon: Dionte Gan MD;  Location: [facility] [place] [facility];  Service: General;  Laterality: Left;    PLACEMENT OF DUAL-LUMEN VASCULAR CATHETER Right 07/26/2022    Procedure: INSERTION-CATHETER-Hemosplit;  Surgeon: Dionte Gan MD;  Location: [facility] [place] [facility];  Service: General;  Laterality: Right;       Review of patient's allergies indicates:   Allergen Reactions    Droperidol Hives    Haldol [haloperidol lactate] Hives    Penicillins Hives    Droperidol (bulk) Anxiety     STATES "FREAKS OUT".  TOLERATES HALDOL PRIOR TO ARRIVAL AT ANOTHER FACILITY TODAY.        No current facility-administered medications on file prior to encounter.     Current Outpatient Medications on File Prior to Encounter   Medication Sig    insulin glargine U-100, Lantus, (LANTUS SOLOSTAR U-100 INSULIN) 100 unit/mL (3 mL) InPn pen Inject 18 Units into the skin every evening.    apixaban (ELIQUIS) 5 mg Tab Take 1 tablet (5 mg total) by mouth 2 (two) times daily. Patient w/ anemia, so held during admission, follow-up with hematologist about restarting    aspirin 325 MG tablet Take 1 tablet (325 mg total) by mouth once daily.    atropine 1% (ISOPTO ATROPINE) 1 % Drop Place 1 drop into the left eye 2 (two) times a day.    blood sugar diagnostic (TRUE METRIX GLUCOSE TEST STRIP) Strp Use 1 strip to check blood glucose three times daily    blood-glucose meter (TRUE METRIX GLUCOSE METER) Misc Use to check blood glucose    blood-glucose meter,continuous Misc USE WITH SENSORS    blood-glucose sensor (DEXCOM G7 SENSOR) Heather Use as directed for " CGM. Change sensor every 10 days    blood-glucose sensor Heather CHANGE EVERY 10 DAYS    brimonidine 0.15 % OPTH DROP (ALPHAGAN) 0.15 % ophthalmic solution Place 1 drop into both eyes 3 (three) times daily.    brinzolamide (AZOPT) 1 % ophthalmic suspension Place1 drop in left eye 3 times a day for 30 days (Patient taking differently: Place 1 drop into the left eye 3 (three) times daily.)    busPIRone (BUSPAR) 10 MG tablet Take 1 tablet (10 mg total) by mouth 3 (three) times daily as needed (anxiety).    busPIRone (BUSPAR) 5 MG Tab take 1 tablet by mouth daily    calcium acetate,phosphat bind, (PHOSLO) 667 mg capsule take 2 capsules by mouth 3 times daily with meals    carvediloL (COREG) 6.25 MG tablet Take 1 tablet (6.25 mg total) by mouth 2 (two) times a day.    cetirizine (ZYRTEC) 10 MG tablet Take 1 tablet every day by oral route. (Patient taking differently: Take 10 mg by mouth once daily.)    clopidogreL (PLAVIX) 75 mg tablet take 1 tablet by mouth daily    dorzolamide-timolol 2-0.5% (COSOPT) 22.3-6.8 mg/mL ophthalmic solution place 1 drop in left eye twice a day  for 30 days (Patient taking differently: Place 1 drop into the left eye 2 (two) times daily.)    fluticasone propionate (FLONASE ALLERGY RELIEF) 50 mcg/actuation nasal spray Irwin 1 spray every day by intranasal route. (Patient taking differently: 1 spray by Each Nostril route Daily.)    gabapentin (NEURONTIN) 300 MG capsule Take 2 capsules twice a day by oral route for 90 days. (Patient taking differently: Take 600 mg by mouth 2 (two) times daily.)    hydrALAZINE (APRESOLINE) 50 MG tablet Take 1 tablet (50 mg total) by mouth every 8 (eight) hours.    HYDROcodone-acetaminophen (NORCO) 5-325 mg per tablet Take 1 tablet by mouth every 4 (four) hours as needed for Pain (flank pain).    HYDROcodone-acetaminophen (NORCO) 7.5-325 mg per tablet take 1 tablet by mouth 3 times daily as needed for pain, moderate to severe (4-10)    insulin aspart U-100 (NOVOLOG)  "100 unit/mL (3 mL) InPn pen Inject 8 Units into the skin 3 (three) times daily with meals.    lancets (TRUEPLUS LANCETS) 33 gauge Misc Use 1 to check blood glucose three times daily    levETIRAcetam (KEPPRA) 500 MG Tab Take 1 tablet (500 mg total) by mouth 2 (two) times a day.    mirtazapine (REMERON) 15 MG tablet Take 1 tablet (15 mg total) by mouth once daily at bedtime    pantoprazole (PROTONIX) 40 MG tablet Take 1 tablet (40 mg total) by mouth once daily.    pen needle, diabetic 31 gauge x 3/16" Ndle Use as directed with insulin once daily    prednisoLONE acetate (PRED FORTE) 1 % DrpS Place 1 drop into the left eye 2 (two) times daily.    rosuvastatin (CRESTOR) 20 MG tablet take 1 tablet by mouth daily at bedtime    rosuvastatin (CRESTOR) 5 MG tablet Take 2 tablets (10 mg total) by mouth once daily at bedtime    sevelamer carbonate (RENVELA) 800 mg Tab Take 1 tablet (800 mg total) by mouth 3 (three) times daily with meals.    sodium bicarbonate 650 MG tablet Take 1 tablet (650 mg total) by mouth 3 (three) times daily.    sucralfate (CARAFATE) 1 gram tablet Take 1 tablet (1 g total) by mouth 4 (four) times daily.    timolol maleate 0.5% (TIMOPTIC) 0.5 % Drop Place 1 drop in left eye 2 times a day for 30 days (Patient taking differently: Place 1 drop into the left eye 2 (two) times daily.)    [DISCONTINUED] albuterol (VENTOLIN HFA) 90 mcg/actuation inhaler Inhale 2 puffs every 4 hours by inhalation route.    [DISCONTINUED] atenoloL (TENORMIN) 50 MG tablet Take 1 tablet (50 mg total) by mouth every other day.    [DISCONTINUED] FLUoxetine 40 MG capsule Take 1 capsule every day by oral route.    [DISCONTINUED] insulin detemir U-100, Levemir, (LEVEMIR FLEXTOUCH U100 INSULIN) 100 unit/mL (3 mL) InPn pen Inject 22 units every day by subcutaneous route in the evening for 90 days.    [DISCONTINUED] omeprazole (PRILOSEC) 20 MG capsule Take 2 capsules (40 mg total) by mouth once daily. for 10 days     Family History       " Problem Relation (Age of Onset)    Diabetes Mother, Father          Tobacco Use    Smoking status: Never    Smokeless tobacco: Never   Substance and Sexual Activity    Alcohol use: Not Currently    Drug use: No    Sexual activity: Not Currently     Partners: Male     Birth control/protection: I.U.D.     Review of Systems   Constitutional:  Positive for activity change and fatigue.   Respiratory:  Positive for shortness of breath.    Cardiovascular:  Negative for chest pain and palpitations.     Objective:     Vital Signs (Most Recent):  Temp: 98.4 °F (36.9 °C) (12/24/24 1212)  Pulse: 75 (12/24/24 1200)  Resp: 17 (12/24/24 1200)  BP: (!) 177/94 (12/24/24 1200)  SpO2: 98 % (12/24/24 1200) Vital Signs (24h Range):  Temp:  [97.8 °F (36.6 °C)-98.4 °F (36.9 °C)] 98.4 °F (36.9 °C)  Pulse:  [67-77] 75  Resp:  [17-24] 17  SpO2:  [91 %-99 %] 98 %  BP: (108-177)/(67-94) 177/94     Weight: 53.1 kg (117 lb)  Body mass index is 21.4 kg/m².     Physical Exam  Vitals and nursing note reviewed.   Constitutional:       General: She is not in acute distress.  HENT:      Head: Normocephalic and atraumatic.      Right Ear: External ear normal.      Left Ear: External ear normal.      Nose: Nose normal.      Mouth/Throat:      Mouth: Mucous membranes are moist.   Eyes:      Extraocular Movements: Extraocular movements intact.      Conjunctiva/sclera: Conjunctivae normal.   Cardiovascular:      Rate and Rhythm: Normal rate and regular rhythm.      Pulses: Normal pulses.      Heart sounds: Normal heart sounds.      Comments: LUE AVF. R femoral CVC with mild oozing.  Pulmonary:      Effort: Pulmonary effort is normal.      Breath sounds: Normal breath sounds.   Abdominal:      General: Bowel sounds are normal. There is no distension.      Palpations: Abdomen is soft.      Tenderness: There is no abdominal tenderness.   Musculoskeletal:         General: Normal range of motion.      Cervical back: Normal range of motion and neck supple.       Right lower leg: No edema.      Left lower leg: No edema.   Skin:     General: Skin is warm and dry.   Neurological:      Mental Status: She is alert. Mental status is at baseline.   Psychiatric:         Mood and Affect: Mood normal.         Behavior: Behavior normal.                Significant Labs: All pertinent labs within the past 24 hours have been reviewed.    Significant Imaging: I have reviewed all pertinent imaging results/findings within the past 24 hours.

## 2024-12-24 NOTE — ED PROVIDER NOTES
"Encounter Date: 2024       History     Chief Complaint   Patient presents with    Kidney pain     And has missed last 3 dialysis appointments     34-year-old female with history of diabetes, ESRD on dialysis, gastroparesis, chronic abdominal and back pain, presenting with chief complaint missing dialysis.  She says she has missed the last 3 dialysis sessions because she has not had a ride because she did not have gas money.  She is complaining severe bilateral back pain.  It appears this is a frequent complaint for the patient.  Chart review shows that she has had numerous CT scans in the last year which have not revealed any cause for her pain.    The history is provided by the patient and medical records.     Review of patient's allergies indicates:   Allergen Reactions    Droperidol Hives    Haldol [haloperidol lactate] Hives    Penicillins Hives    Droperidol (bulk) Anxiety     STATES "FREAKS OUT".  TOLERATES HALDOL PRIOR TO ARRIVAL AT ANOTHER FACILITY TODAY.      Past Medical History:   Diagnosis Date    ESRD on hemodialysis 2022    Gastritis 2022    EGD was 22    Gastroparesis 2022    has not had Emptying study    Heart failure with preserved ejection fraction 2022    EF 55% on 3/22    History of supraventricular tachycardia     Hyperkalemia 2022    Hypertensive emergency 2022    Sickle cell trait 2022    Type 2 diabetes mellitus      Past Surgical History:   Procedure Laterality Date     SECTION      x 3    COLONOSCOPY      COLONOSCOPY N/A 2022    Procedure: COLONOSCOPY;  Surgeon: Jagdeep Cedeno MD;  Location: [facility];  Service: Endoscopy;  Laterality: N/A;    DESTRUCTION, CILIARY BODY, USING LASER Left 2024    Procedure: Cyclophotocoagulation G-probe, retrobulbar chlorpromazine left eye;  Surgeon: Fidel Wise MD;  Location: [address];  Service: Ophthalmology;  Laterality: Left;    ENDOSCOPIC ULTRASOUND OF UPPER " GASTROINTESTINAL TRACT N/A 12/16/2023    Procedure: ULTRASOUND, UPPER GI TRACT, ENDOSCOPIC;  Surgeon: Micky Paredes III, MD;  Location: [facility];  Service: Endoscopy;  Laterality: N/A;    ESOPHAGOGASTRODUODENOSCOPY N/A 10/18/2019    Procedure: ESOPHAGOGASTRODUODENOSCOPY (EGD);  Surgeon: Gianluca Mendez MD;  Location: [facility];  Service: Endoscopy;  Laterality: N/A;    ESOPHAGOGASTRODUODENOSCOPY N/A 08/24/2022    Procedure: EGD (ESOPHAGOGASTRODUODENOSCOPY);  Surgeon: Micky Paredes III, MD;  Location: [facility];  Service: Endoscopy;  Laterality: N/A;    ESOPHAGOGASTRODUODENOSCOPY N/A 12/05/2022    Procedure: EGD (ESOPHAGOGASTRODUODENOSCOPY);  Surgeon: Marcelo Zhong MD;  Location: [facility];  Service: Endoscopy;  Laterality: N/A;    ESOPHAGOGASTRODUODENOSCOPY N/A 9/13/2024    Procedure: EGD (ESOPHAGOGASTRODUODENOSCOPY);  Surgeon: Marcelo Zhong MD;  Location: [facility];  Service: Endoscopy;  Laterality: N/A;    EYE SURGERY      INSERTION, CATHETER, TUNNELED N/A 06/17/2023    Procedure: Insertion,catheter,tunneled;  Surgeon: Carlos Thurman Jr., MD;  Location: [facility];  Service: General;  Laterality: N/A;    LAPAROSCOPIC CHOLECYSTECTOMY N/A 07/30/2022    Procedure: CHOLECYSTECTOMY, LAPAROSCOPIC;  Surgeon: Grey Perez MD;  Location: [facility] OR;  Service: General;  Laterality: N/A;    PLACEMENT OF DUAL-LUMEN VASCULAR CATHETER Left 07/12/2022    Procedure: INSERTION-CATHETER-JOSEPH;  Surgeon: Dionte Gan MD;  Location: [facility] OR;  Service: General;  Laterality: Left;    PLACEMENT OF DUAL-LUMEN VASCULAR CATHETER Right 07/26/2022    Procedure: INSERTION-CATHETER-Hemosplit;  Surgeon: Dionte Gan MD;  Location: [facility] OR;  Service: General;  Laterality: Right;     Family History   Problem Relation Name Age of Onset    Diabetes Mother      Diabetes Father       Social History     Tobacco Use    Smoking status: Never    Smokeless tobacco: Never   Substance Use Topics    Alcohol use: Not  Currently    Drug use: No     Review of Systems   Musculoskeletal:  Positive for back pain.   All other systems reviewed and are negative.      Physical Exam     Initial Vitals [12/24/24 0443]   BP Pulse Resp Temp SpO2   (!) 141/68 77 (!) 24 97.9 °F (36.6 °C) 95 %      MAP       --         Physical Exam    Nursing note and vitals reviewed.  Constitutional: She appears well-developed and well-nourished. She is not diaphoretic. No distress.   HENT:   Head: Normocephalic.   Eyes: Conjunctivae are normal.   Neck: Neck supple.   Normal range of motion.  Cardiovascular:  Normal rate.           Pulmonary/Chest: No respiratory distress.   Abdominal: She exhibits no distension.   Musculoskeletal:         General: No edema.      Cervical back: Normal range of motion and neck supple.     Neurological: She is alert. She has normal strength.   Skin: Skin is warm and dry.   Psychiatric: She has a normal mood and affect.         ED Course   Critical Care    Date/Time: 12/24/2024 4:35 AM    Performed by: Valentino Gomez MD  Authorized by: Valentino Gomez MD  Direct patient critical care time: 15 minutes  Additional history critical care time: 5 minutes  Ordering / reviewing critical care time: 5 minutes  Documentation critical care time: 5 minutes  Consulting other physicians critical care time: 8 minutes  Total critical care time (exclusive of procedural time) : 38 minutes  Critical care was necessary to treat or prevent imminent or life-threatening deterioration of the following conditions: renal failure and metabolic crisis.  Critical care was time spent personally by me on the following activities: discussions with consultants, examination of patient, obtaining history from patient or surrogate, ordering and performing treatments and interventions, ordering and review of laboratory studies, re-evaluation of patient's condition and review of old charts.      Central Line    Date/Time: 12/24/2024 4:35 AM    Performed by:  Valentino Gomez MD  Authorized by: Valentino Gomez MD    Location procedure was performed:  [facility] EMERGENCY DEPARTMENT  Indications:  Med administration  Preparation:  Skin prepped with ChloraPrep  Skin prep agent dried: Skin prep agent completely dried prior to procedure    Sterile barriers: All five maximal sterile barriers used - gloves, gown, cap, mask and large sterile sheet    Hand hygiene: Hand hygiene performed immediately prior to central venous catheter insertion    Location:  Right femoral  Site selection rationale:  Pt not sitting still  Catheter size:  7 Fr  Manometry: No    Number of attempts:  1  Securement:  Line sutured and blood return through all ports  Complications: No    Estimated blood loss (mL):  5  Guidewire: guidewire removed intact    Adverse Events:  None    Labs Reviewed   CBC W/ AUTO DIFFERENTIAL - Abnormal       Result Value    WBC 4.81      RBC 1.98 (*)     Hemoglobin 5.6 (*)     Hematocrit 18.0 (*)     MCV 91      MCH 28.3      MCHC 31.1 (*)     RDW 17.2 (*)     Platelets 51 (*)     MPV 11.4      Immature Granulocytes 0.8 (*)     Gran # (ANC) 4.2      Immature Grans (Abs) 0.04      Lymph # 0.3 (*)     Mono # 0.2 (*)     Eos # 0.0      Baso # 0.02      nRBC 0      Gran % 87.2 (*)     Lymph % 6.4 (*)     Mono % 4.4      Eosinophil % 0.8      Basophil % 0.4      Platelet Estimate Decreased (*)     Differential Method Automated     COMPREHENSIVE METABOLIC PANEL - Abnormal    Sodium 139      Potassium 7.1 (*)     Chloride 106      CO2 12 (*)     Glucose 305 (*)      (*)     Creatinine 16.9 (*)     Calcium 8.6 (*)     Total Protein 7.5      Albumin 3.9      Total Bilirubin 1.7 (*)     Alkaline Phosphatase 196 (*)     AST 19      ALT 24      eGFR 3 (*)     Anion Gap 21 (*)     Narrative:     Potassium    critical result(s) called and verbal readback obtained   from Anabel Cline RN.  by TH1 12/24/2024 06:05   TYPE & SCREEN    Specimen Outdate 12/27/2024 23:59     PREPARE RBC SOFT           Imaging Results    None          Medications   calcium gluconate 1 g in NS IVPB (premixed) (has no administration in time range)   0.9%  NaCl infusion (for blood administration) (has no administration in time range)   sodium bicarbonate solution 50 mEq (has no administration in time range)   sodium zirconium cyclosilicate packet 10 g (has no administration in time range)   albuterol sulfate nebulizer solution 10 mg (10 mg Nebulization Given 12/24/24 0639)     Medical Decision Making  34-year-old presenting after missing a week of dialysis.  Very hypokalemic at 7.1.  Also anemic with hemoglobin of 5.6.  IV calcium, albuterol nebulizer, insulin and glucose, 2 units blood transfusion ordered.  Discussed with Nephrology, Dr. Figueroa, who will arrange for emergent dialysis.  Also recommends admission overnight for repeat labs tomorrow given her significant laboratory abnormality.  Hospitalist consulted for admission.    Amount and/or Complexity of Data Reviewed  Labs: ordered.    Risk  Prescription drug management.               ED Course as of 12/24/24 0707   Tue Dec 24, 2024   0702 Patient pulled her IV out prior to receiving any of her medications and multiple attempts from nursing were unsuccessful, and I have placed a central line to obtain access. [BA]      ED Course User Index  [BA] Valentino Gomez MD                           Clinical Impression:  Final diagnoses:  [N18.6, Z99.2] ESRD on dialysis (Primary)  [E87.6] Hypokalemia  [D64.9] Anemia, unspecified type  [E87.5] Hyperkalemia                 Valentino Gomez MD  12/24/24 0633       Valentino Gomez MD  12/24/24 0756

## 2024-12-24 NOTE — HPI
Tabby Howard is a 34 year old female with a past medical history of ESRD, DM, anemia, HTN, HLD, thrombocytopenia, seizure and gastroparesis who presented with hyperkalemia and anemia with Hgb of 5.6 after missed HD sessions last week. She endorses some shortness of breath but she denies chest pain or palpitations. She denies any gross bleeding. Nephrology was consulted from the ED for HD with two units of PRBCs. Hospital Medicine was consulted for admission.

## 2024-12-24 NOTE — ED TRIAGE NOTES
Tabby Howard is here with 'kidney pain' and has missed last 3 dialysis appointments, pain started this AM and has gotten worse.

## 2024-12-24 NOTE — ASSESSMENT & PLAN NOTE
Patient's blood pressure range in the last 24 hours was: BP  Min: 108/76  Max: 177/94.The patient's inpatient anti-hypertensive regimen is listed below:  Current Antihypertensives  carvediloL tablet 6.25 mg, 2 times daily, Oral  dorzolamide-timolol 2-0.5% ophthalmic solution 1 drop, 2 times daily, Left Eye  hydrALAZINE tablet 50 mg, Every 8 hours, Oral    Plan  - BP is controlled, no changes needed to their regimen

## 2024-12-24 NOTE — CONSULTS
INPATIENT NEPHROLOGY CONSULT   [facility] NEPHROLOGY    Tabby Howard  12/24/2024    Reason for consultation:    Esrd, acidosis, hyperkalemia    Chief Complaint:   Chief Complaint   Patient presents with    Kidney pain     And has missed last 3 dialysis appointments          History of Present Illness:    Per ER           34-year-old female with history of diabetes, ESRD on dialysis, gastroparesis, chronic abdominal and back pain, presenting with chief complaint missing dialysis.  She says she has missed the last 3 dialysis sessions because she has not had a ride because she did not have gas money.  She is complaining severe bilateral back pain.  It appears this is a frequent complaint for the patient.  Chart review shows that she has had numerous CT scans in the last year which have not revealed any cause for her pain.              Plan of Care:       Assessment:    esrd  --continue dialysis per routine  --fluid restrict  --renal dose medication per routine  --continue outpt medication  --continue binders with meals    Anemia  --erythropoiesis stimulating agent with renal replacement therapy    Hyperphosphatemia  --renal diet  --continue binders    Hypertension  --uf with hd  --fluid restrict  --low salt diet  --continue home medication    Metabolic acidosis  --35 bicarb dialysate    Hyperkalemia  --stat hd  --one amp bicarb  --lokelma  --holding glucose/insulin because pt hyperglycemic  --calcium gluconate  --glycemic control    Non-compliance with dialysis  --pt has been counseled about the risk of death due to missing dialysis ad nauseum     Pt with h/o of drug seeking.   Recommend judicious use of analgesics        Thank you for allowing us to participate in this patient's care. We will continue to follow.    Vital Signs:  Temp Readings from Last 3 Encounters:   12/24/24 97.9 °F (36.6 °C) (Temporal)   12/07/24 98 °F (36.7 °C) (Oral)   12/06/24 97.6 °F (36.4 °C) (Oral)       Pulse Readings from Last 3  Encounters:   24 77   24 88   24 86       BP Readings from Last 3 Encounters:   24 (!) 141/68   24 (!) 155/94   24 (!) 177/83       Weight:  Wt Readings from Last 3 Encounters:   24 53.1 kg (117 lb)   24 53.1 kg (117 lb)   24 49.7 kg (109 lb 9.1 oz)       Past Medical & Surgical History:  Past Medical History:   Diagnosis Date    ESRD on hemodialysis 2022    Gastritis 2022    EGD was 22    Gastroparesis 2022    has not had Emptying study    Heart failure with preserved ejection fraction 2022    EF 55% on 3/22    History of supraventricular tachycardia     Hyperkalemia 2022    Hypertensive emergency 2022    Sickle cell trait 2022    Type 2 diabetes mellitus        Past Surgical History:   Procedure Laterality Date     SECTION      x 3    COLONOSCOPY      COLONOSCOPY N/A 2022    Procedure: COLONOSCOPY;  Surgeon: Jagdeep Cedeno MD;  Location: [facility];  Service: Endoscopy;  Laterality: N/A;    DESTRUCTION, CILIARY BODY, USING LASER Left 2024    Procedure: Cyclophotocoagulation G-probe, retrobulbar chlorpromazine left eye;  Surgeon: Fidel Wise MD;  Location: [address];  Service: Ophthalmology;  Laterality: Left;    ENDOSCOPIC ULTRASOUND OF UPPER GASTROINTESTINAL TRACT N/A 2023    Procedure: ULTRASOUND, UPPER GI TRACT, ENDOSCOPIC;  Surgeon: Micky Paredes III, MD;  Location: [facility];  Service: Endoscopy;  Laterality: N/A;    ESOPHAGOGASTRODUODENOSCOPY N/A 10/18/2019    Procedure: ESOPHAGOGASTRODUODENOSCOPY (EGD);  Surgeon: Gialnuca Mendez MD;  Location: [facility];  Service: Endoscopy;  Laterality: N/A;    ESOPHAGOGASTRODUODENOSCOPY N/A 2022    Procedure: EGD (ESOPHAGOGASTRODUODENOSCOPY);  Surgeon: Micky Paredes III, MD;  Location: [facility];  Service: Endoscopy;  Laterality: N/A;    ESOPHAGOGASTRODUODENOSCOPY N/A 2022    Procedure: EGD  (ESOPHAGOGASTRODUODENOSCOPY);  Surgeon: Marcelo Zhong MD;  Location: [facility] ENDO;  Service: Endoscopy;  Laterality: N/A;    ESOPHAGOGASTRODUODENOSCOPY N/A 9/13/2024    Procedure: EGD (ESOPHAGOGASTRODUODENOSCOPY);  Surgeon: Marcelo Zhong MD;  Location: [facility] ENDO;  Service: Endoscopy;  Laterality: N/A;    EYE SURGERY      INSERTION, CATHETER, TUNNELED N/A 06/17/2023    Procedure: Insertion,catheter,tunneled;  Surgeon: Carlos Thurman Jr., MD;  Location: [facility] OR;  Service: General;  Laterality: N/A;    LAPAROSCOPIC CHOLECYSTECTOMY N/A 07/30/2022    Procedure: CHOLECYSTECTOMY, LAPAROSCOPIC;  Surgeon: Grey Perez MD;  Location: [facility] OR;  Service: General;  Laterality: N/A;    PLACEMENT OF DUAL-LUMEN VASCULAR CATHETER Left 07/12/2022    Procedure: INSERTION-CATHETER-JOSEPH;  Surgeon: Dionte Gan MD;  Location: [facility] OR;  Service: General;  Laterality: Left;    PLACEMENT OF DUAL-LUMEN VASCULAR CATHETER Right 07/26/2022    Procedure: INSERTION-CATHETER-Hemosplit;  Surgeon: Dionte Gan MD;  Location: [facility] OR;  Service: General;  Laterality: Right;       Past Social History:  Social History     Socioeconomic History    Marital status:    Tobacco Use    Smoking status: Never    Smokeless tobacco: Never   Substance and Sexual Activity    Alcohol use: Not Currently    Drug use: No    Sexual activity: Not Currently     Partners: Male     Birth control/protection: I.U.D.     Social Drivers of Health     Financial Resource Strain: High Risk (11/17/2024)    Overall Financial Resource Strain (CARDIA)     Difficulty of Paying Living Expenses: Hard   Food Insecurity: No Food Insecurity (11/17/2024)    Hunger Vital Sign     Worried About Running Out of Food in the Last Year: Never true     Ran Out of Food in the Last Year: Never true   Recent Concern: Food Insecurity - Food Insecurity Present (8/25/2024)    Hunger Vital Sign     Worried About Running Out of Food in the Last Year: Often true     Ran Out  of Food in the Last Year: Often true   Transportation Needs: Unmet Transportation Needs (11/17/2024)    TRANSPORTATION NEEDS     Transportation : Yes, it has kept me from medical appointments or from getting my medications.   Physical Activity: Insufficiently Active (11/17/2024)    Exercise Vital Sign     Days of Exercise per Week: 7 days     Minutes of Exercise per Session: 20 min   Stress: Stress Concern Present (11/17/2024)    [employer] [place] [employer] - Occupational Stress Questionnaire     Feeling of Stress : To some extent   Housing Stability: Low Risk  (11/17/2024)    Housing Stability Vital Sign     Unable to Pay for Housing in the Last Year: No     Homeless in the Last Year: No   Recent Concern: Housing Stability - High Risk (10/23/2024)    Housing Stability Vital Sign     Unable to Pay for Housing in the Last Year: Yes     Homeless in the Last Year: No       Medications:  No current facility-administered medications on file prior to encounter.     Current Outpatient Medications on File Prior to Encounter   Medication Sig Dispense Refill    apixaban (ELIQUIS) 5 mg Tab Take 1 tablet (5 mg total) by mouth 2 (two) times daily. Patient w/ anemia, so held during admission, follow-up with hematologist about restarting      aspirin 325 MG tablet Take 1 tablet (325 mg total) by mouth once daily. 30 tablet 0    atropine 1% (ISOPTO ATROPINE) 1 % Drop Place 1 drop into the left eye 2 (two) times a day. 15 mL 3    blood sugar diagnostic (TRUE METRIX GLUCOSE TEST STRIP) Strp Use 1 strip to check blood glucose three times daily 100 each 11    blood-glucose meter (TRUE METRIX GLUCOSE METER) Misc Use to check blood glucose 1 each 0    blood-glucose meter,continuous Misc USE WITH SENSORS 1 each 0    blood-glucose sensor (DEXCOM G7 SENSOR) Heather Use as directed for CGM. Change sensor every 10 days 3 each 0    blood-glucose sensor Heather CHANGE EVERY 10 DAYS 3 each 0    brimonidine 0.15 % OPTH DROP (ALPHAGAN) 0.15  % ophthalmic solution Place 1 drop into both eyes 3 (three) times daily. 15 mL 3    brinzolamide (AZOPT) 1 % ophthalmic suspension Place1 drop in left eye 3 times a day for 30 days (Patient taking differently: Place 1 drop into the left eye 3 (three) times daily.) 15 mL 6    busPIRone (BUSPAR) 10 MG tablet Take 1 tablet (10 mg total) by mouth 3 (three) times daily as needed (anxiety). 60 tablet 5    busPIRone (BUSPAR) 5 MG Tab take 1 tablet by mouth daily 30 tablet 0    calcium acetate,phosphat bind, (PHOSLO) 667 mg capsule take 2 capsules by mouth 3 times daily with meals 180 capsule 0    carvediloL (COREG) 6.25 MG tablet Take 1 tablet (6.25 mg total) by mouth 2 (two) times a day. 60 tablet 0    cetirizine (ZYRTEC) 10 MG tablet Take 1 tablet every day by oral route. (Patient taking differently: Take 10 mg by mouth once daily.) 30 tablet 0    clopidogreL (PLAVIX) 75 mg tablet take 1 tablet by mouth daily 30 tablet 0    dorzolamide-timolol 2-0.5% (COSOPT) 22.3-6.8 mg/mL ophthalmic solution place 1 drop in left eye twice a day  for 30 days (Patient taking differently: Place 1 drop into the left eye 2 (two) times daily.) 10 mL 0    fluticasone propionate (FLONASE ALLERGY RELIEF) 50 mcg/actuation nasal spray [place] 1 spray every day by intranasal route. (Patient taking differently: 1 spray by Each Nostril route Daily.) 16 g 2    gabapentin (NEURONTIN) 300 MG capsule Take 2 capsules twice a day by oral route for 90 days. (Patient taking differently: Take 600 mg by mouth 2 (two) times daily.) 360 capsule 1    hydrALAZINE (APRESOLINE) 50 MG tablet Take 1 tablet (50 mg total) by mouth every 8 (eight) hours.      HYDROcodone-acetaminophen (NORCO) 5-325 mg per tablet Take 1 tablet by mouth every 4 (four) hours as needed for Pain (flank pain). 20 tablet 0    HYDROcodone-acetaminophen (NORCO) 7.5-325 mg per tablet take 1 tablet by mouth 3 times daily as needed for pain, moderate to severe (4-10) 10 tablet 0    insulin aspart  "U-100 (NOVOLOG) 100 unit/mL (3 mL) InPn pen Inject 8 Units into the skin 3 (three) times daily with meals. 15 mL 0    lancets (TRUEPLUS LANCETS) 33 gauge Misc Use 1 to check blood glucose three times daily 100 each 11    levETIRAcetam (KEPPRA) 500 MG Tab Take 1 tablet (500 mg total) by mouth 2 (two) times a day. 60 tablet 2    mirtazapine (REMERON) 15 MG tablet Take 1 tablet (15 mg total) by mouth once daily at bedtime 30 tablet 0    pantoprazole (PROTONIX) 40 MG tablet Take 1 tablet (40 mg total) by mouth once daily. 30 tablet 0    pen needle, diabetic 31 gauge x 3/16" Ndle Use as directed with insulin once daily 100 each 0    prednisoLONE acetate (PRED FORTE) 1 % DrpS Place 1 drop into the left eye 2 (two) times daily. 5 mL 3    rosuvastatin (CRESTOR) 20 MG tablet take 1 tablet by mouth daily at bedtime 30 tablet 0    rosuvastatin (CRESTOR) 5 MG tablet Take 2 tablets (10 mg total) by mouth once daily at bedtime 60 tablet 0    sevelamer carbonate (RENVELA) 800 mg Tab Take 1 tablet (800 mg total) by mouth 3 (three) times daily with meals. 180 tablet 11    sodium bicarbonate 650 MG tablet Take 1 tablet (650 mg total) by mouth 3 (three) times daily. 270 tablet 3    sucralfate (CARAFATE) 1 gram tablet Take 1 tablet (1 g total) by mouth 4 (four) times daily. 100 tablet 1    timolol maleate 0.5% (TIMOPTIC) 0.5 % Drop Place 1 drop in left eye 2 times a day for 30 days (Patient taking differently: Place 1 drop into the left eye 2 (two) times daily.) 5 mL 6    [DISCONTINUED] albuterol (VENTOLIN HFA) 90 mcg/actuation inhaler Inhale 2 puffs every 4 hours by inhalation route. 8 g 2    [DISCONTINUED] atenoloL (TENORMIN) 50 MG tablet Take 1 tablet (50 mg total) by mouth every other day. 45 tablet 3    [DISCONTINUED] FLUoxetine 40 MG capsule Take 1 capsule every day by oral route. 30 capsule 2    [DISCONTINUED] insulin detemir U-100, Levemir, (LEVEMIR FLEXTOUCH U100 INSULIN) 100 unit/mL (3 mL) InPn pen Inject 22 units every day " "by subcutaneous route in the evening for 90 days. 18 mL 1    [DISCONTINUED] omeprazole (PRILOSEC) 20 MG capsule Take 2 capsules (40 mg total) by mouth once daily. for 10 days 20 capsule 0     Scheduled Meds:   albuterol sulfate  10 mg Nebulization ED 1 Time    calcium gluconate IVPB  1 g Intravenous ED 1 Time    sodium bicarbonate  50 mEq Intravenous Once    sodium zirconium cyclosilicate  10 g Oral Once     Continuous Infusions:  PRN Meds:.  Current Facility-Administered Medications:     0.9%  NaCl infusion (for blood administration), , Intravenous, Q24H PRN    Allergies:  Droperidol, Haldol [haloperidol lactate], Penicillins, and Droperidol (bulk)    Past Family History:  Reviewed; refer to Hospitalist Admission Note    Review of Systems:  Review of Systems - All 14 systems reviewed and negative, except as noted in HPI    Physical Exam:    BP (!) 141/68 (BP Location: Right arm)   Pulse 77   Temp 97.9 °F (36.6 °C) (Temporal)   Resp (!) 24   Ht 5' 2" (1.575 m)   Wt 53.1 kg (117 lb)   LMP  (LMP Unknown)   SpO2 95%   Breastfeeding No   BMI 21.40 kg/m²     General Appearance:    Alert, cooperative, no distress, appears stated age   Head:    Normocephalic, without obvious abnormality, atraumatic   Eyes:    PER, conjunctiva/corneas clear, EOM's intact in both eyes        Throat:   Lips, mucosa, and tongue normal; teeth and gums normal   Back:     Symmetric, no curvature, ROM normal, no CVA tenderness   Lungs:     Clear to auscultation bilaterally, respirations unlabored   Chest wall:    No tenderness or deformity   Heart:    Regular rate and rhythm, S1 and S2 normal, no murmur, rub   or gallop   Abdomen:     Soft, non-tender, bowel sounds active all four quadrants,     no masses, no organomegaly   Extremities:   Extremities normal, atraumatic, no cyanosis or edema   Pulses:   2+ and symmetric all extremities   MSK:   No joint or muscle swelling, tenderness or deformity   Skin:   Skin color, texture, turgor " normal, no rashes or lesions   Neurologic:   CNII-XII intact, normal strength and sensation       Throughout.  No flap     Results:  Lab Results   Component Value Date     12/24/2024    K 7.1 (HH) 12/24/2024     12/24/2024    CO2 12 (L) 12/24/2024     (H) 12/24/2024    CREATININE 16.9 (H) 12/24/2024    CALCIUM 8.6 (L) 12/24/2024    ANIONGAP 21 (H) 12/24/2024    ESTGFRAFRICA 19 (L) 09/03/2022    EGFRNONAA 18 (A) 07/31/2022       Lab Results   Component Value Date    CALCIUM 8.6 (L) 12/24/2024    PHOS 3.2 12/06/2024       Recent Labs   Lab 12/24/24  0449   WBC 4.81   RBC 1.98*   HGB 5.6*   HCT 18.0*   PLT 51*   MCV 91   MCH 28.3   MCHC 31.1*     Imaging Results    None           I have personally reviewed pertinent radiological imaging and reports.    Hugh Figueroa MD  [facility] [place]  [phone]

## 2024-12-24 NOTE — CARE UPDATE
12/24/24 0639   Patient Assessment/Suction   Level of Consciousness (AVPU) alert   Respiratory Effort Unlabored   Expansion/Accessory Muscles/Retractions no use of accessory muscles;expansion symmetric;no retractions   All Lung Fields Breath Sounds equal bilaterally   Rhythm/Pattern, Respiratory unlabored;pattern regular;depth regular;no shortness of breath reported   Cough Frequency no cough   PRE-TX-O2   Device (Oxygen Therapy) room air   SpO2 (!) 93 %   Pulse Oximetry Type Continuous   $ Pulse Oximetry - Single Charge Pulse Oximetry - Single   Pulse 70   Resp 20   Aerosol Therapy   $ Aerosol Therapy Charges Aerosol Treatment   Respiratory Treatment Status (SVN) given   Treatment Route (SVN) mask;oxygen   Patient Position HOB elevated   Post Treatment Assessment (SVN) breath sounds unchanged   Signs of Intolerance (SVN) none   Breath Sounds Post-Respiratory Treatment   Post-treatment Heart Rate (beats/min) 70   Post-treatment Resp Rate (breaths/min) 20

## 2024-12-24 NOTE — H&P
[facility]  History & Physical    Patient Name: Tabby Howard  MRN: [id number]  Patient Class: OP- Observation  Admission Date: 2024  Attending Physician: Raymundo Parker MD  Primary Care Provider: Melony Kim NP         Patient information was obtained from patient, past medical records, and ER records.     Subjective:     Principal Problem:Hyperkalemia    Chief Complaint:   Chief Complaint   Patient presents with    Kidney pain     And has missed last 3 dialysis appointments        HPI: Tabby Howard is a 34 year old female with a past medical history of ESRD, DM, anemia, HTN, HLD, thrombocytopenia, seizure and gastroparesis who presented with hyperkalemia and anemia with Hgb of 5.6 after missed HD sessions last week. She endorses some shortness of breath but she denies chest pain or palpitations. She denies any gross bleeding. Nephrology was consulted from the ED for HD with two units of PRBCs. Hospital Medicine was consulted for admission.    Past Medical History:   Diagnosis Date    ESRD on hemodialysis 2022    Gastritis 2022    EGD was 22    Gastroparesis 2022    has not had Emptying study    Heart failure with preserved ejection fraction 2022    EF 55% on 3/22    History of supraventricular tachycardia     Hyperkalemia 2022    Hypertensive emergency 2022    Sickle cell trait 2022    Type 2 diabetes mellitus        Past Surgical History:   Procedure Laterality Date     SECTION      x 3    COLONOSCOPY      COLONOSCOPY N/A 2022    Procedure: COLONOSCOPY;  Surgeon: Jagdeep Cedeno MD;  Location: [facility];  Service: Endoscopy;  Laterality: N/A;    DESTRUCTION, CILIARY BODY, USING LASER Left 2024    Procedure: Cyclophotocoagulation G-probe, retrobulbar chlorpromazine left eye;  Surgeon: Fidel Wise MD;  Location: [address];  Service: Ophthalmology;  Laterality: Left;    ENDOSCOPIC ULTRASOUND  "OF UPPER GASTROINTESTINAL TRACT N/A 12/16/2023    Procedure: ULTRASOUND, UPPER GI TRACT, ENDOSCOPIC;  Surgeon: Micky Paredes III, MD;  Location: [facility];  Service: Endoscopy;  Laterality: N/A;    ESOPHAGOGASTRODUODENOSCOPY N/A 10/18/2019    Procedure: ESOPHAGOGASTRODUODENOSCOPY (EGD);  Surgeon: Gianluca Mendez MD;  Location: [facility] ENDO;  Service: Endoscopy;  Laterality: N/A;    ESOPHAGOGASTRODUODENOSCOPY N/A 08/24/2022    Procedure: EGD (ESOPHAGOGASTRODUODENOSCOPY);  Surgeon: Micky Paredes III, MD;  Location: [facility];  Service: Endoscopy;  Laterality: N/A;    ESOPHAGOGASTRODUODENOSCOPY N/A 12/05/2022    Procedure: EGD (ESOPHAGOGASTRODUODENOSCOPY);  Surgeon: Marcelo Zhong MD;  Location: [facility];  Service: Endoscopy;  Laterality: N/A;    ESOPHAGOGASTRODUODENOSCOPY N/A 9/13/2024    Procedure: EGD (ESOPHAGOGASTRODUODENOSCOPY);  Surgeon: Marcelo Zhong MD;  Location: [facility];  Service: Endoscopy;  Laterality: N/A;    EYE SURGERY      INSERTION, CATHETER, TUNNELED N/A 06/17/2023    Procedure: Insertion,catheter,tunneled;  Surgeon: Carlos Thurman Jr., MD;  Location: [facility] OR;  Service: General;  Laterality: N/A;    LAPAROSCOPIC CHOLECYSTECTOMY N/A 07/30/2022    Procedure: CHOLECYSTECTOMY, LAPAROSCOPIC;  Surgeon: Grey Perez MD;  Location: [facility] OR;  Service: General;  Laterality: N/A;    PLACEMENT OF DUAL-LUMEN VASCULAR CATHETER Left 07/12/2022    Procedure: INSERTION-CATHETER-JOSEPH;  Surgeon: Dionte Gan MD;  Location: [facility] OR;  Service: General;  Laterality: Left;    PLACEMENT OF DUAL-LUMEN VASCULAR CATHETER Right 07/26/2022    Procedure: INSERTION-CATHETER-Hemosplit;  Surgeon: Dionte Gan MD;  Location: [facility] OR;  Service: General;  Laterality: Right;       Review of patient's allergies indicates:   Allergen Reactions    Droperidol Hives    Haldol [haloperidol lactate] Hives    Penicillins Hives    Droperidol (bulk) Anxiety     STATES "FREAKS OUT".  TOLERATES HALDOL PRIOR TO " ARRIVAL AT ANOTHER FACILITY TODAY.        No current facility-administered medications on file prior to encounter.     Current Outpatient Medications on File Prior to Encounter   Medication Sig    insulin glargine U-100, Lantus, (LANTUS SOLOSTAR U-100 INSULIN) 100 unit/mL (3 mL) InPn pen Inject 18 Units into the skin every evening.    apixaban (ELIQUIS) 5 mg Tab Take 1 tablet (5 mg total) by mouth 2 (two) times daily. Patient w/ anemia, so held during admission, follow-up with hematologist about restarting    aspirin 325 MG tablet Take 1 tablet (325 mg total) by mouth once daily.    atropine 1% (ISOPTO ATROPINE) 1 % Drop Place 1 drop into the left eye 2 (two) times a day.    blood sugar diagnostic (TRUE METRIX GLUCOSE TEST STRIP) Strp Use 1 strip to check blood glucose three times daily    blood-glucose meter (TRUE METRIX GLUCOSE METER) Misc Use to check blood glucose    blood-glucose meter,continuous Misc USE WITH SENSORS    blood-glucose sensor (DEXCOM G7 SENSOR) Heather Use as directed for CGM. Change sensor every 10 days    blood-glucose sensor Heather CHANGE EVERY 10 DAYS    brimonidine 0.15 % OPTH DROP (ALPHAGAN) 0.15 % ophthalmic solution Place 1 drop into both eyes 3 (three) times daily.    brinzolamide (AZOPT) 1 % ophthalmic suspension Place1 drop in left eye 3 times a day for 30 days (Patient taking differently: Place 1 drop into the left eye 3 (three) times daily.)    busPIRone (BUSPAR) 10 MG tablet Take 1 tablet (10 mg total) by mouth 3 (three) times daily as needed (anxiety).    busPIRone (BUSPAR) 5 MG Tab take 1 tablet by mouth daily    calcium acetate,phosphat bind, (PHOSLO) 667 mg capsule take 2 capsules by mouth 3 times daily with meals    carvediloL (COREG) 6.25 MG tablet Take 1 tablet (6.25 mg total) by mouth 2 (two) times a day.    cetirizine (ZYRTEC) 10 MG tablet Take 1 tablet every day by oral route. (Patient taking differently: Take 10 mg by mouth once daily.)    clopidogreL (PLAVIX) 75 mg tablet  "take 1 tablet by mouth daily    dorzolamide-timolol 2-0.5% (COSOPT) 22.3-6.8 mg/mL ophthalmic solution place 1 drop in left eye twice a day  for 30 days (Patient taking differently: Place 1 drop into the left eye 2 (two) times daily.)    fluticasone propionate (FLONASE ALLERGY RELIEF) 50 mcg/actuation nasal spray [place] 1 spray every day by intranasal route. (Patient taking differently: 1 spray by Each Nostril route Daily.)    gabapentin (NEURONTIN) 300 MG capsule Take 2 capsules twice a day by oral route for 90 days. (Patient taking differently: Take 600 mg by mouth 2 (two) times daily.)    hydrALAZINE (APRESOLINE) 50 MG tablet Take 1 tablet (50 mg total) by mouth every 8 (eight) hours.    HYDROcodone-acetaminophen (NORCO) 5-325 mg per tablet Take 1 tablet by mouth every 4 (four) hours as needed for Pain (flank pain).    HYDROcodone-acetaminophen (NORCO) 7.5-325 mg per tablet take 1 tablet by mouth 3 times daily as needed for pain, moderate to severe (4-10)    insulin aspart U-100 (NOVOLOG) 100 unit/mL (3 mL) InPn pen Inject 8 Units into the skin 3 (three) times daily with meals.    lancets (TRUEPLUS LANCETS) 33 gauge Misc Use 1 to check blood glucose three times daily    levETIRAcetam (KEPPRA) 500 MG Tab Take 1 tablet (500 mg total) by mouth 2 (two) times a day.    mirtazapine (REMERON) 15 MG tablet Take 1 tablet (15 mg total) by mouth once daily at bedtime    pantoprazole (PROTONIX) 40 MG tablet Take 1 tablet (40 mg total) by mouth once daily.    pen needle, diabetic 31 gauge x 3/16" Ndle Use as directed with insulin once daily    prednisoLONE acetate (PRED FORTE) 1 % DrpS Place 1 drop into the left eye 2 (two) times daily.    rosuvastatin (CRESTOR) 20 MG tablet take 1 tablet by mouth daily at bedtime    rosuvastatin (CRESTOR) 5 MG tablet Take 2 tablets (10 mg total) by mouth once daily at bedtime    sevelamer carbonate (RENVELA) 800 mg Tab Take 1 tablet (800 mg total) by mouth 3 (three) times daily with meals.    " sodium bicarbonate 650 MG tablet Take 1 tablet (650 mg total) by mouth 3 (three) times daily.    sucralfate (CARAFATE) 1 gram tablet Take 1 tablet (1 g total) by mouth 4 (four) times daily.    timolol maleate 0.5% (TIMOPTIC) 0.5 % Drop Place 1 drop in left eye 2 times a day for 30 days (Patient taking differently: Place 1 drop into the left eye 2 (two) times daily.)    [DISCONTINUED] albuterol (VENTOLIN HFA) 90 mcg/actuation inhaler Inhale 2 puffs every 4 hours by inhalation route.    [DISCONTINUED] atenoloL (TENORMIN) 50 MG tablet Take 1 tablet (50 mg total) by mouth every other day.    [DISCONTINUED] FLUoxetine 40 MG capsule Take 1 capsule every day by oral route.    [DISCONTINUED] insulin detemir U-100, Levemir, (LEVEMIR FLEXTOUCH U100 INSULIN) 100 unit/mL (3 mL) InPn pen Inject 22 units every day by subcutaneous route in the evening for 90 days.    [DISCONTINUED] omeprazole (PRILOSEC) 20 MG capsule Take 2 capsules (40 mg total) by mouth once daily. for 10 days     Family History       Problem Relation (Age of Onset)    Diabetes Mother, Father          Tobacco Use    Smoking status: Never    Smokeless tobacco: Never   Substance and Sexual Activity    Alcohol use: Not Currently    Drug use: No    Sexual activity: Not Currently     Partners: Male     Birth control/protection: I.U.D.     Review of Systems   Constitutional:  Positive for activity change and fatigue.   Respiratory:  Positive for shortness of breath.    Cardiovascular:  Negative for chest pain and palpitations.     Objective:     Vital Signs (Most Recent):  Temp: 98.4 °F (36.9 °C) (12/24/24 1212)  Pulse: 75 (12/24/24 1200)  Resp: 17 (12/24/24 1200)  BP: (!) 177/94 (12/24/24 1200)  SpO2: 98 % (12/24/24 1200) Vital Signs (24h Range):  Temp:  [97.8 °F (36.6 °C)-98.4 °F (36.9 °C)] 98.4 °F (36.9 °C)  Pulse:  [67-77] 75  Resp:  [17-24] 17  SpO2:  [91 %-99 %] 98 %  BP: (108-177)/(67-94) 177/94     Weight: 53.1 kg (117 lb)  Body mass index is 21.4 kg/m².      Physical Exam  Vitals and nursing note reviewed.   Constitutional:       General: She is not in acute distress.  HENT:      Head: Normocephalic and atraumatic.      Right Ear: External ear normal.      Left Ear: External ear normal.      Nose: Nose normal.      Mouth/Throat:      Mouth: Mucous membranes are moist.   Eyes:      Extraocular Movements: Extraocular movements intact.      Conjunctiva/sclera: Conjunctivae normal.   Cardiovascular:      Rate and Rhythm: Normal rate and regular rhythm.      Pulses: Normal pulses.      Heart sounds: Normal heart sounds.      Comments: LUE AVF. R femoral CVC with mild oozing.  Pulmonary:      Effort: Pulmonary effort is normal.      Breath sounds: Normal breath sounds.   Abdominal:      General: Bowel sounds are normal. There is no distension.      Palpations: Abdomen is soft.      Tenderness: There is no abdominal tenderness.   Musculoskeletal:         General: Normal range of motion.      Cervical back: Normal range of motion and neck supple.      Right lower leg: No edema.      Left lower leg: No edema.   Skin:     General: Skin is warm and dry.   Neurological:      Mental Status: She is alert. Mental status is at baseline.   Psychiatric:         Mood and Affect: Mood normal.         Behavior: Behavior normal.                Significant Labs: All pertinent labs within the past 24 hours have been reviewed.    Significant Imaging: I have reviewed all pertinent imaging results/findings within the past 24 hours.  Assessment/Plan:     * Hyperkalemia  -HD per Nephrology  -Trend K  -Telemetry        Seizures  -Continue home Keppra      Anemia in ESRD (end-stage renal disease)  Mild oozing from CVC.  -Hold anticoagulation   -Two units PRBCs given with HD  -Trend Hgb with CBC    Type 2 diabetes mellitus with hyperglycemia, with long-term current use of insulin, previous HbA1c 5.8%  Patient's FSGs are controlled on current medication regimen.  Last A1c reviewed-   Lab Results  "  Component Value Date    HGBA1C 9.0 (H) 09/10/2024     Most recent fingerstick glucose reviewed- No results for input(s): "POCTGLUCOSE" in the last 24 hours.  Current correctional scale  Medium  Maintain anti-hyperglycemic dose as follows-   Antihyperglycemics (From admission, onward)      Start     Stop Route Frequency Ordered    12/24/24 0841  insulin aspart U-100 pen 0-10 Units         -- SubQ Before meals & nightly PRN 12/24/24 0743          Hold Oral hypoglycemics while patient is in the hospital.       Thrombocytopenia  In setting of ESRD. The patients 3 most recent labs are listed below.  Recent Labs     12/24/24  0449   PLT 51*     Plan  - Will transfuse if platelet count is <20k.      Uncontrolled hypertension  Patient's blood pressure range in the last 24 hours was: BP  Min: 108/76  Max: 177/94.The patient's inpatient anti-hypertensive regimen is listed below:  Current Antihypertensives  carvediloL tablet 6.25 mg, 2 times daily, Oral  dorzolamide-timolol 2-0.5% ophthalmic solution 1 drop, 2 times daily, Left Eye  hydrALAZINE tablet 50 mg, Every 8 hours, Oral    Plan  - BP is controlled, no changes needed to their regimen    ESRD (end stage renal disease)  -HD per Nephrology  -Renally dose medications      VTE Risk Mitigation (From admission, onward)           Ordered     Reason for No Pharmacological VTE Prophylaxis  Once        Question:  Reasons:  Answer:  Already adequately anticoagulated on oral Anticoagulants    12/24/24 0743     IP VTE HIGH RISK PATIENT  Once         12/24/24 0743     Place sequential compression device  Until discontinued         12/24/24 0743                       On 12/24/2024, patient should be placed in hospital observation services under my care.             Raymundo Parker MD  Department of Hospital Medicine  [facility]          "

## 2024-12-24 NOTE — PLAN OF CARE
[facility] - ED  Initial Discharge Assessment       Primary Care Provider: Melony Kim NP    Admission Diagnosis: ESRD on dialysis [N18.6, Z99.2]    Admission Date: 12/24/2024  Expected Discharge Date: 12/27/2024    Transition of Care Barriers: Transportation, Does not adhere to care plan    Payor: MEDICAID / Plan: [place] / Product Type: Managed Medicaid /     Extended Emergency Contact Information  Primary Emergency Contact: Svetlana Freire  Home Phone: [phone]  Mobile Phone: [phone]  Relation: Mother  Preferred language: English   needed? No  Secondary Emergency Contact: Garcia Howard  Address: [facility] [place], [place] [address] [place] [address]  Home Phone: [phone]  Mobile Phone: [phone]  Relation: Father  Preferred language: English   needed? No    Discharge Plan A: Home with family  Discharge Plan B: Home with family      [facility]  [address]  [facility] [address]  Phone: [phone] Fax: [phone]    Unable to complete assessment at bedside with pt due to pt behaviors, pt care and Dialysis.  Completed assessment via chart review.    The patient live with her 3 dependent children, is independent with actvities. She goes to HD TTS at [facility] in [place]. DME: glucometer. [facility] [place]. She denies coumadin and HHC. The anticipated DC Disposition is home. Family to provide transportation home at discharge. CM will continue to follow and assess DC needs throughout this hospitalization.    Initial Assessment (most recent)       Adult Discharge Assessment - 12/24/24 1113          Discharge Assessment    Assessment Type Discharge Planning Assessment     Confirmed/corrected address, phone number and insurance Yes     Confirmed Demographics Correct on Facesheet     Source of Information health record     People in Home child(tammy), dependent     Facility  Arrived From: home     Do you expect to return to your current living situation? Yes     Do you have help at home or someone to help you manage your care at home? No     Prior to hospitilization cognitive status: Unable to Assess     Current cognitive status: Unable to Assess     Walking or Climbing Stairs Difficulty no     Dressing/Bathing Difficulty no     Equipment Currently Used at Home glucometer     Readmission within 30 days? Yes     Patient currently being followed by outpatient case management? Yes     If yes, name of outpatient case management following: [facility] outpatient case management     Do you currently have service(s) that help you manage your care at home? No     Do you take prescription medications? Yes     Do you have prescription coverage? Yes     Do you have any problems affording any of your prescribed medications? TBD     Is the patient taking medications as prescribed? yes     Who is going to help you get home at discharge? family     How do you get to doctors appointments? family or friend will provide;health plan transportation     Are you on dialysis? Yes     Dialysis Name and Scheduled days [facility]     Do you take coumadin? No     Discharge Plan A Home with family     Discharge Plan B Home with family     DME Needed Upon Discharge  none     Transition of Care Barriers Transportation;Does not adhere to care plan

## 2024-12-24 NOTE — ASSESSMENT & PLAN NOTE
"Patient's FSGs are controlled on current medication regimen.  Last A1c reviewed-   Lab Results   Component Value Date    HGBA1C 9.0 (H) 09/10/2024     Most recent fingerstick glucose reviewed- No results for input(s): "POCTGLUCOSE" in the last 24 hours.  Current correctional scale  Medium  Maintain anti-hyperglycemic dose as follows-   Antihyperglycemics (From admission, onward)      Start     Stop Route Frequency Ordered    12/24/24 0841  insulin aspart U-100 pen 0-10 Units         -- SubQ Before meals & nightly PRN 12/24/24 0743          Hold Oral hypoglycemics while patient is in the hospital.     "

## 2024-12-24 NOTE — PLAN OF CARE
Problem: Hemodialysis  Goal: Safe, Effective Therapy Delivery  Outcome: Progressing  Goal: Effective Tissue Perfusion  Outcome: Progressing  Goal: Absence of Infection Signs and Symptoms  Outcome: Progressing     Problem: Adult Inpatient Plan of Care  Goal: Plan of Care Review  Outcome: Progressing  Goal: Patient-Specific Goal (Individualized)  Outcome: Progressing  Goal: Absence of Hospital-Acquired Illness or Injury  Outcome: Progressing  Goal: Optimal Comfort and Wellbeing  Outcome: Progressing  Goal: Readiness for Transition of Care  Outcome: Progressing     Problem: Diabetes Comorbidity  Goal: Blood Glucose Level Within Targeted Range  Outcome: Progressing     Problem: Infection  Goal: Absence of Infection Signs and Symptoms  Outcome: Progressing     Problem: Skin Injury Risk Increased  Goal: Skin Health and Integrity  Outcome: Progressing

## 2024-12-24 NOTE — ASSESSMENT & PLAN NOTE
Mild oozing from CVC.  -Hold anticoagulation   -Two units PRBCs given with HD  -Trend Hgb with CBC

## 2024-12-24 NOTE — NURSING
Patient displaced dressing on femoral line. Quick clot applied, dressing changed and reinforced. Patient educated on protecting area for safety.

## 2024-12-24 NOTE — ASSESSMENT & PLAN NOTE
In setting of ESRD. The patients 3 most recent labs are listed below.  Recent Labs     12/24/24  0449   PLT 51*     Plan  - Will transfuse if platelet count is <20k.

## 2024-12-24 NOTE — NURSING
Pt appears asleep, no respiratory distress noted. Dialysis ongoing. IV intact and dry. Vital sign WNL.

## 2024-12-24 NOTE — PROGRESS NOTES
12/24/24 1335   Vital Signs   Temp 98.1 °F (36.7 °C)   Pulse 86   Resp 18   SpO2 95 %   BP (!) 160/83   Assessments (Pre/Post)   Consent Obtained yes   Safety vein preservation armband present   Blood Liters Processed (BLP) 68.9   Transport Modality not applicable   Level of Consciousness (AVPU) alert   Dialyzer Clearance mildly streaked   Pain   Preferred Pain Scale number (Numeric Rating Pain Scale)   Pain Rating (0-10): Rest 0  (pt sleeping)        Hemodialysis AV Fistula Left upper arm   No placement date or time found.   Location: Left upper arm   Site Assessment Clean;Dry;Intact;No redness;No swelling   Patency Present;Bruit;Thrill   Status Deaccessed   Flows Good   Dressing Intervention First dressing   Dressing Status Clean;Dry;Intact   Site Condition No complications   Dressing Gauze   Post-Hemodialysis Assessment   Rinseback Volume (mL) 250 mL   Blood Volume Processed (Liters) 68.9 L   Dialyzer Clearance Lightly streaked   Duration of Treatment 180 minutes   Additional Fluid Intake (mL) 600 mL   Total UF (mL) 4800 mL   Net Fluid Removal 3700   Patient Response to Treatment tolerated well   Post-Treatment Weight 49.8 kg (109 lb 12.6 oz)   Treatment Weight Change -3.3   Arterial bleeding stop time (min) 5 min   Venous bleeding stop time (min) 5 min   Post-Hemodialysis Comments no problems     Pt educated on missed HD txs. Report given to Guanako LINTON

## 2024-12-25 VITALS
BODY MASS INDEX: 21.53 KG/M2 | OXYGEN SATURATION: 90 % | RESPIRATION RATE: 16 BRPM | WEIGHT: 117 LBS | HEART RATE: 82 BPM | DIASTOLIC BLOOD PRESSURE: 96 MMHG | HEIGHT: 62 IN | SYSTOLIC BLOOD PRESSURE: 174 MMHG | TEMPERATURE: 99 F

## 2024-12-25 PROBLEM — E87.70 FLUID VOLUME EXCESS: Status: RESOLVED | Noted: 2022-10-04 | Resolved: 2024-12-25

## 2024-12-25 PROBLEM — E87.5 HYPERKALEMIA: Status: RESOLVED | Noted: 2020-12-13 | Resolved: 2024-12-25

## 2024-12-25 LAB
ALBUMIN SERPL BCP-MCNC: 3.2 G/DL (ref 3.5–5.2)
ALP SERPL-CCNC: 178 U/L (ref 40–150)
ALT SERPL W/O P-5'-P-CCNC: 16 U/L (ref 10–44)
ANION GAP SERPL CALC-SCNC: 20 MMOL/L (ref 8–16)
AST SERPL-CCNC: 23 U/L (ref 10–40)
BASOPHILS # BLD AUTO: 0.01 K/UL (ref 0–0.2)
BASOPHILS NFR BLD: 0.2 % (ref 0–1.9)
BILIRUB SERPL-MCNC: 2.3 MG/DL (ref 0.1–1)
BUN SERPL-MCNC: 52 MG/DL (ref 6–20)
CALCIUM SERPL-MCNC: 8.5 MG/DL (ref 8.7–10.5)
CHLORIDE SERPL-SCNC: 98 MMOL/L (ref 95–110)
CO2 SERPL-SCNC: 22 MMOL/L (ref 23–29)
CREAT SERPL-MCNC: 9.3 MG/DL (ref 0.5–1.4)
DIFFERENTIAL METHOD BLD: ABNORMAL
EOSINOPHIL # BLD AUTO: 0 K/UL (ref 0–0.5)
EOSINOPHIL NFR BLD: 1 % (ref 0–8)
ERYTHROCYTE [DISTWIDTH] IN BLOOD BY AUTOMATED COUNT: 16.1 % (ref 11.5–14.5)
EST. GFR  (NO RACE VARIABLE): 5 ML/MIN/1.73 M^2
GLUCOSE SERPL-MCNC: 115 MG/DL (ref 70–110)
HCT VFR BLD AUTO: 21.9 % (ref 37–48.5)
HGB BLD-MCNC: 7.2 G/DL (ref 12–16)
IMM GRANULOCYTES # BLD AUTO: 0.03 K/UL (ref 0–0.04)
IMM GRANULOCYTES NFR BLD AUTO: 0.7 % (ref 0–0.5)
LYMPHOCYTES # BLD AUTO: 0.6 K/UL (ref 1–4.8)
LYMPHOCYTES NFR BLD: 13.1 % (ref 18–48)
MAGNESIUM SERPL-MCNC: 2.4 MG/DL (ref 1.6–2.6)
MCH RBC QN AUTO: 28.2 PG (ref 27–31)
MCHC RBC AUTO-ENTMCNC: 32.9 G/DL (ref 32–36)
MCV RBC AUTO: 86 FL (ref 82–98)
MONOCYTES # BLD AUTO: 0.3 K/UL (ref 0.3–1)
MONOCYTES NFR BLD: 7.6 % (ref 4–15)
NEUTROPHILS # BLD AUTO: 3.2 K/UL (ref 1.8–7.7)
NEUTROPHILS NFR BLD: 77.4 % (ref 38–73)
NRBC BLD-RTO: 0 /100 WBC
PHOSPHATE SERPL-MCNC: 6.6 MG/DL (ref 2.7–4.5)
PLATELET # BLD AUTO: 66 K/UL (ref 150–450)
PMV BLD AUTO: 10.4 FL (ref 9.2–12.9)
POCT GLUCOSE: 123 MG/DL (ref 70–110)
POTASSIUM SERPL-SCNC: 4.1 MMOL/L (ref 3.5–5.1)
PROT SERPL-MCNC: 6.5 G/DL (ref 6–8.4)
RBC # BLD AUTO: 2.55 M/UL (ref 4–5.4)
SODIUM SERPL-SCNC: 140 MMOL/L (ref 136–145)
WBC # BLD AUTO: 4.19 K/UL (ref 3.9–12.7)

## 2024-12-25 PROCEDURE — 85025 COMPLETE CBC W/AUTO DIFF WBC: CPT | Performed by: STUDENT IN AN ORGANIZED HEALTH CARE EDUCATION/TRAINING PROGRAM

## 2024-12-25 PROCEDURE — 63600175 PHARM REV CODE 636 W HCPCS: Performed by: NURSE PRACTITIONER

## 2024-12-25 PROCEDURE — 25000003 PHARM REV CODE 250: Performed by: INTERNAL MEDICINE

## 2024-12-25 PROCEDURE — 36415 COLL VENOUS BLD VENIPUNCTURE: CPT | Performed by: STUDENT IN AN ORGANIZED HEALTH CARE EDUCATION/TRAINING PROGRAM

## 2024-12-25 PROCEDURE — 25000003 PHARM REV CODE 250: Performed by: STUDENT IN AN ORGANIZED HEALTH CARE EDUCATION/TRAINING PROGRAM

## 2024-12-25 PROCEDURE — 94760 N-INVAS EAR/PLS OXIMETRY 1: CPT

## 2024-12-25 PROCEDURE — 84100 ASSAY OF PHOSPHORUS: CPT | Performed by: STUDENT IN AN ORGANIZED HEALTH CARE EDUCATION/TRAINING PROGRAM

## 2024-12-25 PROCEDURE — 80053 COMPREHEN METABOLIC PANEL: CPT | Performed by: STUDENT IN AN ORGANIZED HEALTH CARE EDUCATION/TRAINING PROGRAM

## 2024-12-25 PROCEDURE — G0378 HOSPITAL OBSERVATION PER HR: HCPCS

## 2024-12-25 PROCEDURE — 96375 TX/PRO/DX INJ NEW DRUG ADDON: CPT

## 2024-12-25 PROCEDURE — 83735 ASSAY OF MAGNESIUM: CPT | Performed by: STUDENT IN AN ORGANIZED HEALTH CARE EDUCATION/TRAINING PROGRAM

## 2024-12-25 PROCEDURE — 94761 N-INVAS EAR/PLS OXIMETRY MLT: CPT

## 2024-12-25 RX ORDER — HEPARIN SODIUM 5000 [USP'U]/ML
5000 INJECTION, SOLUTION INTRAVENOUS; SUBCUTANEOUS EVERY 8 HOURS
Status: DISCONTINUED | OUTPATIENT
Start: 2024-12-25 | End: 2024-12-25

## 2024-12-25 RX ORDER — HYDROMORPHONE HYDROCHLORIDE 1 MG/ML
0.5 INJECTION, SOLUTION INTRAMUSCULAR; INTRAVENOUS; SUBCUTANEOUS ONCE
Status: COMPLETED | OUTPATIENT
Start: 2024-12-25 | End: 2024-12-25

## 2024-12-25 RX ADMIN — LEVETIRACETAM 500 MG: 500 TABLET, FILM COATED ORAL at 09:12

## 2024-12-25 RX ADMIN — PANTOPRAZOLE SODIUM 40 MG: 40 TABLET, DELAYED RELEASE ORAL at 09:12

## 2024-12-25 RX ADMIN — MUPIROCIN 1 G: 20 OINTMENT TOPICAL at 09:12

## 2024-12-25 RX ADMIN — ATORVASTATIN CALCIUM 40 MG: 40 TABLET, FILM COATED ORAL at 09:12

## 2024-12-25 RX ADMIN — CARVEDILOL 6.25 MG: 6.25 TABLET, FILM COATED ORAL at 09:12

## 2024-12-25 RX ADMIN — DORZOLAMIDE HYDROCHLORIDE AND TIMOLOL MALEATE 1 DROP: 20; 5 SOLUTION/ DROPS OPHTHALMIC at 09:12

## 2024-12-25 RX ADMIN — HYDROMORPHONE HYDROCHLORIDE 0.5 MG: 1 INJECTION, SOLUTION INTRAMUSCULAR; INTRAVENOUS; SUBCUTANEOUS at 12:12

## 2024-12-25 RX ADMIN — BUSPIRONE HYDROCHLORIDE 5 MG: 5 TABLET ORAL at 09:12

## 2024-12-25 RX ADMIN — SODIUM BICARBONATE 650 MG TABLET 650 MG: at 09:12

## 2024-12-25 RX ADMIN — ATROPINE SULFATE 1 DROP: 10 SOLUTION OPHTHALMIC at 09:12

## 2024-12-25 RX ADMIN — SEVELAMER CARBONATE 800 MG: 800 TABLET, FILM COATED ORAL at 09:12

## 2024-12-25 RX ADMIN — DIPHENHYDRAMINE HYDROCHLORIDE 25 MG: 25 CAPSULE ORAL at 05:12

## 2024-12-25 RX ADMIN — BRINZOLAMIDE 1 DROP: 10 SUSPENSION/ DROPS OPHTHALMIC at 09:12

## 2024-12-25 RX ADMIN — CALCIUM ACETATE 1334 MG: 667 CAPSULE ORAL at 09:12

## 2024-12-25 RX ADMIN — HYDRALAZINE HYDROCHLORIDE 50 MG: 25 TABLET ORAL at 05:12

## 2024-12-25 RX ADMIN — GABAPENTIN 600 MG: 300 CAPSULE ORAL at 09:12

## 2024-12-25 NOTE — NURSING
Pt d/c instructions provided w/ f/u care information. Pt reports understanding of d/c info. Right femoral IV removed, pt assisted in dressing and escorted down via wheelchair.

## 2024-12-25 NOTE — HOSPITAL COURSE
Tabby Howard is a 34 year old female with a past medical history of ESRD, DM, anemia, HTN, HLD, thrombocytopenia, seizure and gastroparesis who presented with hyperkalemia and anemia with Hgb of 5.6 after missed HD. Two units of PRBCs ordered from the ED and Nephrology was consulted for HD. The Hgb and K improved and roughly 4.8 L were removed with HD. She was discharged 12/25 and counseled not to miss HD sessions.

## 2024-12-25 NOTE — PROGRESS NOTES
INPATIENT NEPHROLOGY CONSULT   [facility] NEPHROLOGY    Tabby Howard  12/25/2024    Reason for consultation:  Esrd, acidosis, hyperkalemia    Chief Complaint:   Chief Complaint   Patient presents with    Kidney pain     And has missed last 3 dialysis appointments     History of Present Illness:    Per ER     34-year-old female with history of diabetes, ESRD on dialysis, gastroparesis, chronic abdominal and back pain, presenting with chief complaint missing dialysis.  She says she has missed the last 3 dialysis sessions because she has not had a ride because she did not have gas money.  She is complaining severe bilateral back pain.  It appears this is a frequent complaint for the patient.  Chart review shows that she has had numerous CT scans in the last year which have not revealed any cause for her pain.        12/25 VSS. OK t odc and follow as outpatient with dialysis.    Plan of Care:    esrd  --continue dialysis per routine  --renal dose medication per routine    Anemia of CKD  --erythropoiesis stimulating agent with renal replacement therapy    Hyperphosphatemia  Secondary HPT  --renal diet as tolerated  --continue binders as tolerated    Hypertension  --uf with hd as tolerated  --continue home medication    Metabolic acidosis  --35 bicarb dialysate    Thank you for allowing us to participate in this patient's care. We will continue to follow.    Vital Signs:  Temp Readings from Last 3 Encounters:   12/25/24 98.2 °F (36.8 °C)   12/07/24 98 °F (36.7 °C) (Oral)   12/06/24 97.6 °F (36.4 °C) (Oral)       Pulse Readings from Last 3 Encounters:   12/25/24 81   12/07/24 88   12/06/24 86       BP Readings from Last 3 Encounters:   12/25/24 (!) 157/84   12/07/24 (!) 155/94   12/06/24 (!) 177/83       Weight:  Wt Readings from Last 3 Encounters:   12/24/24 53.1 kg (117 lb)   12/07/24 53.1 kg (117 lb)   12/05/24 49.7 kg (109 lb 9.1 oz)       Past Medical & Surgical History:  Past Medical History:   Diagnosis Date     ESRD on hemodialysis 2022    Gastritis 2022    EGD was 22    Gastroparesis 2022    has not had Emptying study    Heart failure with preserved ejection fraction 2022    EF 55% on 3/22    History of supraventricular tachycardia     Hyperkalemia 2022    Hypertensive emergency 2022    Sickle cell trait 2022    Type 2 diabetes mellitus        Past Surgical History:   Procedure Laterality Date     SECTION      x 3    COLONOSCOPY      COLONOSCOPY N/A 2022    Procedure: COLONOSCOPY;  Surgeon: Jagdeep Cedeno MD;  Location: [facility];  Service: Endoscopy;  Laterality: N/A;    DESTRUCTION, CILIARY BODY, USING LASER Left 2024    Procedure: Cyclophotocoagulation G-probe, retrobulbar chlorpromazine left eye;  Surgeon: Fidel Wise MD;  Location: [address];  Service: Ophthalmology;  Laterality: Left;    ENDOSCOPIC ULTRASOUND OF UPPER GASTROINTESTINAL TRACT N/A 2023    Procedure: ULTRASOUND, UPPER GI TRACT, ENDOSCOPIC;  Surgeon: Micky Paredes III, MD;  Location: [facility];  Service: Endoscopy;  Laterality: N/A;    ESOPHAGOGASTRODUODENOSCOPY N/A 10/18/2019    Procedure: ESOPHAGOGASTRODUODENOSCOPY (EGD);  Surgeon: Gianluca Mendez MD;  Location: [facility];  Service: Endoscopy;  Laterality: N/A;    ESOPHAGOGASTRODUODENOSCOPY N/A 2022    Procedure: EGD (ESOPHAGOGASTRODUODENOSCOPY);  Surgeon: Micky Paredes III, MD;  Location: [facility];  Service: Endoscopy;  Laterality: N/A;    ESOPHAGOGASTRODUODENOSCOPY N/A 2022    Procedure: EGD (ESOPHAGOGASTRODUODENOSCOPY);  Surgeon: Marcelo Zhong MD;  Location: [facility];  Service: Endoscopy;  Laterality: N/A;    ESOPHAGOGASTRODUODENOSCOPY N/A 2024    Procedure: EGD (ESOPHAGOGASTRODUODENOSCOPY);  Surgeon: Marcelo Zhong MD;  Location: [facility];  Service: Endoscopy;  Laterality: N/A;    EYE SURGERY      INSERTION, CATHETER, TUNNELED N/A 2023    Procedure:  Insertion,catheter,tunneled;  Surgeon: Carlos Thurman Jr., MD;  Location: [facility] OR;  Service: General;  Laterality: N/A;    LAPAROSCOPIC CHOLECYSTECTOMY N/A 07/30/2022    Procedure: CHOLECYSTECTOMY, LAPAROSCOPIC;  Surgeon: Grey Perez MD;  Location: [facility] OR;  Service: General;  Laterality: N/A;    PLACEMENT OF DUAL-LUMEN VASCULAR CATHETER Left 07/12/2022    Procedure: INSERTION-CATHETER-JOSEPH;  Surgeon: Dionte Gan MD;  Location: [facility] OR;  Service: General;  Laterality: Left;    PLACEMENT OF DUAL-LUMEN VASCULAR CATHETER Right 07/26/2022    Procedure: INSERTION-CATHETER-Hemosplit;  Surgeon: Dionte Gan MD;  Location: [facility] OR;  Service: General;  Laterality: Right;       Past Social History:  Social History     Socioeconomic History    Marital status:    Tobacco Use    Smoking status: Never    Smokeless tobacco: Never   Substance and Sexual Activity    Alcohol use: Not Currently    Drug use: No    Sexual activity: Not Currently     Partners: Male     Birth control/protection: I.U.D.     Social Drivers of Health     Financial Resource Strain: Patient Declined (12/24/2024)    Overall Financial Resource Strain (CARDIA)     Difficulty of Paying Living Expenses: Patient declined   Recent Concern: Financial Resource Strain - High Risk (11/17/2024)    Overall Financial Resource Strain (CARDIA)     Difficulty of Paying Living Expenses: Hard   Food Insecurity: Patient Declined (12/24/2024)    Hunger Vital Sign     Worried About Running Out of Food in the Last Year: Patient declined     Ran Out of Food in the Last Year: Patient declined   Transportation Needs: Patient Declined (12/24/2024)    TRANSPORTATION NEEDS     Transportation : Patient declined   Recent Concern: Transportation Needs - Unmet Transportation Needs (11/17/2024)    TRANSPORTATION NEEDS     Transportation : Yes, it has kept me from medical appointments or from getting my medications.   Physical Activity: Insufficiently Active  (11/17/2024)    Exercise Vital Sign     Days of Exercise per Week: 7 days     Minutes of Exercise per Session: 20 min   Stress: Patient Declined (12/24/2024)    [employer] [place] [employer] - Occupational Stress Questionnaire     Feeling of Stress : Patient declined   Recent Concern: Stress - Stress Concern Present (11/17/2024)    [employer] [place] [employer] - Occupational Stress Questionnaire     Feeling of Stress : To some extent   Housing Stability: Patient Declined (12/24/2024)    Housing Stability Vital Sign     Unable to Pay for Housing in the Last Year: Patient declined     Homeless in the Last Year: Patient declined   Recent Concern: Housing Stability - High Risk (10/23/2024)    Housing Stability Vital Sign     Unable to Pay for Housing in the Last Year: Yes     Homeless in the Last Year: No       Medications:  No current facility-administered medications on file prior to encounter.     Current Outpatient Medications on File Prior to Encounter   Medication Sig Dispense Refill    insulin glargine U-100, Lantus, (LANTUS SOLOSTAR U-100 INSULIN) 100 unit/mL (3 mL) InPn pen Inject 18 Units into the skin every evening.      apixaban (ELIQUIS) 5 mg Tab Take 1 tablet (5 mg total) by mouth 2 (two) times daily. Patient w/ anemia, so held during admission, follow-up with hematologist about restarting      aspirin 325 MG tablet Take 1 tablet (325 mg total) by mouth once daily. 30 tablet 0    atropine 1% (ISOPTO ATROPINE) 1 % Drop Place 1 drop into the left eye 2 (two) times a day. 15 mL 3    blood sugar diagnostic (TRUE METRIX GLUCOSE TEST STRIP) Strp Use 1 strip to check blood glucose three times daily 100 each 11    blood-glucose meter (TRUE METRIX GLUCOSE METER) Misc Use to check blood glucose 1 each 0    blood-glucose meter,continuous Misc USE WITH SENSORS 1 each 0    blood-glucose sensor (DEXCOM G7 SENSOR) Heather Use as directed for CGM. Change sensor every 10 days 3 each 0    blood-glucose  sensor Heather CHANGE EVERY 10 DAYS 3 each 0    brimonidine 0.15 % OPTH DROP (ALPHAGAN) 0.15 % ophthalmic solution Place 1 drop into both eyes 3 (three) times daily. 15 mL 3    brinzolamide (AZOPT) 1 % ophthalmic suspension Place1 drop in left eye 3 times a day for 30 days (Patient taking differently: Place 1 drop into the left eye 3 (three) times daily.) 15 mL 6    busPIRone (BUSPAR) 10 MG tablet Take 1 tablet (10 mg total) by mouth 3 (three) times daily as needed (anxiety). 60 tablet 5    busPIRone (BUSPAR) 5 MG Tab take 1 tablet by mouth daily 30 tablet 0    calcium acetate,phosphat bind, (PHOSLO) 667 mg capsule take 2 capsules by mouth 3 times daily with meals 180 capsule 0    carvediloL (COREG) 6.25 MG tablet Take 1 tablet (6.25 mg total) by mouth 2 (two) times a day. 60 tablet 0    cetirizine (ZYRTEC) 10 MG tablet Take 1 tablet every day by oral route. (Patient taking differently: Take 10 mg by mouth once daily.) 30 tablet 0    clopidogreL (PLAVIX) 75 mg tablet take 1 tablet by mouth daily 30 tablet 0    dorzolamide-timolol 2-0.5% (COSOPT) 22.3-6.8 mg/mL ophthalmic solution place 1 drop in left eye twice a day  for 30 days (Patient taking differently: Place 1 drop into the left eye 2 (two) times daily.) 10 mL 0    fluticasone propionate (FLONASE ALLERGY RELIEF) 50 mcg/actuation nasal spray [place] 1 spray every day by intranasal route. (Patient taking differently: 1 spray by Each Nostril route Daily.) 16 g 2    gabapentin (NEURONTIN) 300 MG capsule Take 2 capsules twice a day by oral route for 90 days. (Patient taking differently: Take 600 mg by mouth 2 (two) times daily.) 360 capsule 1    hydrALAZINE (APRESOLINE) 50 MG tablet Take 1 tablet (50 mg total) by mouth every 8 (eight) hours.      HYDROcodone-acetaminophen (NORCO) 5-325 mg per tablet Take 1 tablet by mouth every 4 (four) hours as needed for Pain (flank pain). 20 tablet 0    HYDROcodone-acetaminophen (NORCO) 7.5-325 mg per tablet take 1 tablet by mouth 3  "times daily as needed for pain, moderate to severe (4-10) 10 tablet 0    insulin aspart U-100 (NOVOLOG) 100 unit/mL (3 mL) InPn pen Inject 8 Units into the skin 3 (three) times daily with meals. 15 mL 0    lancets (TRUEPLUS LANCETS) 33 gauge Misc Use 1 to check blood glucose three times daily 100 each 11    levETIRAcetam (KEPPRA) 500 MG Tab Take 1 tablet (500 mg total) by mouth 2 (two) times a day. 60 tablet 2    mirtazapine (REMERON) 15 MG tablet Take 1 tablet (15 mg total) by mouth once daily at bedtime 30 tablet 0    pantoprazole (PROTONIX) 40 MG tablet Take 1 tablet (40 mg total) by mouth once daily. 30 tablet 0    pen needle, diabetic 31 gauge x 3/16" Ndle Use as directed with insulin once daily 100 each 0    prednisoLONE acetate (PRED FORTE) 1 % DrpS Place 1 drop into the left eye 2 (two) times daily. 5 mL 3    rosuvastatin (CRESTOR) 5 MG tablet Take 2 tablets (10 mg total) by mouth once daily at bedtime 60 tablet 0    sevelamer carbonate (RENVELA) 800 mg Tab Take 1 tablet (800 mg total) by mouth 3 (three) times daily with meals. 180 tablet 11    sodium bicarbonate 650 MG tablet Take 1 tablet (650 mg total) by mouth 3 (three) times daily. 270 tablet 3    sucralfate (CARAFATE) 1 gram tablet Take 1 tablet (1 g total) by mouth 4 (four) times daily. 100 tablet 1    timolol maleate 0.5% (TIMOPTIC) 0.5 % Drop Place 1 drop in left eye 2 times a day for 30 days (Patient taking differently: Place 1 drop into the left eye 2 (two) times daily.) 5 mL 6    [DISCONTINUED] albuterol (VENTOLIN HFA) 90 mcg/actuation inhaler Inhale 2 puffs every 4 hours by inhalation route. 8 g 2    [DISCONTINUED] atenoloL (TENORMIN) 50 MG tablet Take 1 tablet (50 mg total) by mouth every other day. 45 tablet 3    [DISCONTINUED] FLUoxetine 40 MG capsule Take 1 capsule every day by oral route. 30 capsule 2    [DISCONTINUED] insulin detemir U-100, Levemir, (LEVEMIR FLEXTOUCH U100 INSULIN) 100 unit/mL (3 mL) InPn pen Inject 22 units every day by " subcutaneous route in the evening for 90 days. 18 mL 1    [DISCONTINUED] omeprazole (PRILOSEC) 20 MG capsule Take 2 capsules (40 mg total) by mouth once daily. for 10 days 20 capsule 0    [DISCONTINUED] rosuvastatin (CRESTOR) 20 MG tablet take 1 tablet by mouth daily at bedtime 30 tablet 0     Scheduled Meds:   atorvastatin  40 mg Oral Daily    atropine 1%  1 drop Left Eye BID    brinzolamide  1 drop Left Eye TID    busPIRone  5 mg Oral Daily    calcium acetate(phosphat bind)  1,334 mg Oral TID WM    carvediloL  6.25 mg Oral BID    dorzolamide-timolol 2-0.5%  1 drop Left Eye BID    gabapentin  600 mg Oral BID    hydrALAZINE  50 mg Oral Q8H    levETIRAcetam  500 mg Oral BID    mupirocin   Nasal BID    pantoprazole  40 mg Oral Daily    sevelamer carbonate  800 mg Oral TID WM    sodium bicarbonate  650 mg Oral TID     Continuous Infusions:  PRN Meds:.  Current Facility-Administered Medications:     0.9%  NaCl infusion (for blood administration), , Intravenous, Q24H PRN    0.9% NaCl, , Intravenous, PRN    acetaminophen, 650 mg, Oral, Q8H PRN    dextrose 10%, 12.5 g, Intravenous, PRN    dextrose 10%, 25 g, Intravenous, PRN    diphenhydrAMINE, 25 mg, Oral, Q6H PRN    glucagon (human recombinant), 1 mg, Intramuscular, PRN    glucose, 16 g, Oral, PRN    glucose, 24 g, Oral, PRN    HYDROcodone-acetaminophen, 1 tablet, Oral, Q6H PRN    insulin aspart U-100, 0-10 Units, Subcutaneous, QID (AC + HS) PRN    melatonin, 6 mg, Oral, Nightly PRN    naloxone, 0.02 mg, Intravenous, PRN    sodium chloride 0.9%, 250 mL, Intravenous, PRN    sodium chloride 0.9%, 10 mL, Intravenous, PRN    Allergies:  Droperidol, Haldol [haloperidol lactate], Penicillins, and Droperidol (bulk)    Past Family History:  Reviewed; refer to Hospitalist Admission Note    Review of Systems:  Review of Systems - All 14 systems reviewed and negative, except as noted in HPI    Physical Exam:    BP (!) 157/84   Pulse 81   Temp 98.2 °F (36.8 °C)   Resp 16   Ht 5'  "2" (1.575 m)   Wt 53.1 kg (117 lb)   LMP  (LMP Unknown)   SpO2 (!) 91%   Breastfeeding No   BMI 21.40 kg/m²     General Appearance:    Alert, cooperative, no distress, appears stated age   Head:    Normocephalic, without obvious abnormality, atraumatic   Eyes:    PER, conjunctiva/corneas clear, EOM's intact in both eyes        Throat:   Lips, mucosa, and tongue normal; teeth and gums normal   Back:     Symmetric, no curvature, ROM normal, no CVA tenderness   Lungs:     Clear to auscultation bilaterally, respirations unlabored   Chest wall:    No tenderness or deformity   Heart:    Regular rate and rhythm, S1 and S2 normal, no murmur, rub   or gallop   Abdomen:     Soft, non-tender, bowel sounds active all four quadrants,     no masses, no organomegaly   Extremities:   Extremities normal, atraumatic, no cyanosis or edema   Pulses:   2+ and symmetric all extremities   MSK:   No joint or muscle swelling, tenderness or deformity   Skin:   Skin color, texture, turgor normal, no rashes or lesions   Neurologic:   CNII-XII intact, normal strength and sensation       Throughout.  No flap     Results:  Lab Results   Component Value Date     12/25/2024    K 4.1 12/25/2024    CL 98 12/25/2024    CO2 22 (L) 12/25/2024    BUN 52 (H) 12/25/2024    CREATININE 9.3 (H) 12/25/2024    CALCIUM 8.5 (L) 12/25/2024    ANIONGAP 20 (H) 12/25/2024    ESTGFRAFRICA 19 (L) 09/03/2022    EGFRNONAA 18 (A) 07/31/2022       Lab Results   Component Value Date    CALCIUM 8.5 (L) 12/25/2024    PHOS 6.6 (H) 12/25/2024       Recent Labs   Lab 12/25/24  0319   WBC 4.19   RBC 2.55*   HGB 7.2*   HCT 21.9*   PLT 66*   MCV 86   MCH 28.2   MCHC 32.9       Mando Benitez MD  [facility]  [phone]    "

## 2024-12-25 NOTE — DISCHARGE SUMMARY
[facility] Medicine  Discharge Summary      Patient Name: Tabby Howard  MRN: [id number]  UNIQUE: [id number]  Patient Class: OP- Observation  Admission Date: 12/24/2024  Hospital Length of Stay: 0 days  Discharge Date and Time: No discharge date for patient encounter.  Attending Physician: Raymundo Parker MD   Discharging Provider: Raymundo Parker MD  Primary Care Provider: Melony Kim NP    Primary Care Team: Networked reference to record PCT     HPI:   Tabby Howard is a 34 year old female with a past medical history of ESRD, DM, anemia, HTN, HLD, thrombocytopenia, seizure and gastroparesis who presented with hyperkalemia and anemia with Hgb of 5.6 after missed HD sessions last week. She endorses some shortness of breath but she denies chest pain or palpitations. She denies any gross bleeding. Nephrology was consulted from the ED for HD with two units of PRBCs. Hospital Medicine was consulted for admission.    * No surgery found *      Hospital Course:   Tabby Howard is a 34 year old female with a past medical history of ESRD, DM, anemia, HTN, HLD, thrombocytopenia, seizure and gastroparesis who presented with hyperkalemia and anemia with Hgb of 5.6 after missed HD. Two units of PRBCs ordered from the ED and Nephrology was consulted for HD. The Hgb and K improved and roughly 4.8 L were removed with HD. She was discharged 12/25 and counseled not to miss HD sessions.     Goals of Care Treatment Preferences:  Code Status: Full Code    Health care agent: Step-Mother Tanya Howard / Father Garcia Howard  Health care agent number: [phone] / [phone]                   [facility] Screening:  The patient declined to be screened for utility difficulties, food insecurity, transport difficulties, housing insecurity, and interpersonal safety, so no concerns could be identified this admission.     Consults:   Consults (From admission, onward)          Status Ordering Provider      "Inpatient consult to Nephrology  Once        Provider:  Hugh Figueroa MD    Completed LUIS HAMPTON            Seizures  -Continue home Keppra      Anemia in ESRD (end-stage renal disease)  Mild oozing from CVC.  -Hold anticoagulation   -Two units PRBCs given with HD  -Trend Hgb with CBC    Type 2 diabetes mellitus with hyperglycemia, with long-term current use of insulin, previous HbA1c 5.8%  Patient's FSGs are controlled on current medication regimen.  Last A1c reviewed-   Lab Results   Component Value Date    HGBA1C 9.0 (H) 09/10/2024     Most recent fingerstick glucose reviewed- No results for input(s): "POCTGLUCOSE" in the last 24 hours.  Current correctional scale  Medium  Maintain anti-hyperglycemic dose as follows-   Antihyperglycemics (From admission, onward)      Start     Stop Route Frequency Ordered    12/24/24 0841  insulin aspart U-100 pen 0-10 Units         -- SubQ Before meals & nightly PRN 12/24/24 0743          Hold Oral hypoglycemics while patient is in the hospital.       Thrombocytopenia  In setting of ESRD. The patients 3 most recent labs are listed below.  Recent Labs     12/24/24  0449   PLT 51*     Plan  - Will transfuse if platelet count is <20k.      Uncontrolled hypertension  Patient's blood pressure range in the last 24 hours was: BP  Min: 108/76  Max: 177/94.The patient's inpatient anti-hypertensive regimen is listed below:  Current Antihypertensives  carvediloL tablet 6.25 mg, 2 times daily, Oral  dorzolamide-timolol 2-0.5% ophthalmic solution 1 drop, 2 times daily, Left Eye  hydrALAZINE tablet 50 mg, Every 8 hours, Oral    Plan  - BP is controlled, no changes needed to their regimen    ESRD (end stage renal disease)  -HD per Nephrology  -Renally dose medications      Final Active Diagnoses:    Diagnosis Date Noted POA    Seizures [R56.9] 07/16/2024 Yes    Anemia in ESRD (end-stage renal disease) [N18.6, D63.1] 03/20/2023 Yes    Type 2 diabetes mellitus with hyperglycemia, with " long-term current use of insulin, previous HbA1c 5.8% [E11.65, Z79.4] 01/27/2023 Not Applicable    Thrombocytopenia [D69.6] 10/26/2022 Yes     Chronic    Uncontrolled hypertension [I10] 07/30/2022 Yes    ESRD (end stage renal disease) [N18.6] 07/22/2022 Yes      Problems Resolved During this Admission:    Diagnosis Date Noted Date Resolved POA    PRINCIPAL PROBLEM:  Hyperkalemia [E87.5] 12/13/2020 12/25/2024 Yes       Discharged Condition: stable    Disposition: Home or Self Care    Follow Up:    Patient Instructions:      Diet renal     Diet diabetic     Diet Cardiac     Notify your health care provider if you experience any of the following:  increased confusion or weakness     Notify your health care provider if you experience any of the following:  persistent dizziness, light-headedness, or visual disturbances     Notify your health care provider if you experience any of the following:  severe persistent headache     Notify your health care provider if you experience any of the following:  difficulty breathing or increased cough     Notify your health care provider if you experience any of the following:  severe uncontrolled pain     Notify your health care provider if you experience any of the following:  persistent nausea and vomiting or diarrhea     Notify your health care provider if you experience any of the following:  temperature >100.4     Activity as tolerated       Significant Diagnostic Studies: Labs: All labs within the past 24 hours have been reviewed    Pending Diagnostic Studies:       None           Medications:  Reconciled Home Medications:      Medication List        CHANGE how you take these medications      rosuvastatin 5 MG tablet  Commonly known as: CRESTOR  Take 2 tablets (10 mg total) by mouth once daily at bedtime  What changed: Another medication with the same name was removed. Continue taking this medication, and follow the directions you see here.            CONTINUE taking these  "medications      ALPHAGAN P 0.15 % ophthalmic solution  Generic drug: brimonidine 0.15 % OPTH DROP  Place 1 drop into both eyes 3 (three) times daily.     apixaban 5 mg Tab  Commonly known as: ELIQUIS  Take 1 tablet (5 mg total) by mouth 2 (two) times daily. Patient w/ anemia, so held during admission, follow-up with hematologist about restarting     aspirin 325 MG tablet  Take 1 tablet (325 mg total) by mouth once daily.     atropine 1% 1 % Drop  Commonly known as: ISOPTO ATROPINE  Place 1 drop into the left eye 2 (two) times a day.     BD ULTRA-FINE MINI PEN NEEDLE 31 gauge x 3/16" Ndle  Generic drug: pen needle, diabetic  Use as directed with insulin once daily     blood-glucose meter Misc  Commonly known as: TRUE METRIX GLUCOSE METER  Use to check blood glucose     blood-glucose meter,continuous Misc  USE WITH SENSORS     brinzolamide 1 % ophthalmic suspension  Commonly known as: AZOPT  Place1 drop in left eye 3 times a day for 30 days     * busPIRone 10 MG tablet  Commonly known as: BUSPAR  Take 1 tablet (10 mg total) by mouth 3 (three) times daily as needed (anxiety).     * busPIRone 5 MG Tab  Commonly known as: BUSPAR  take 1 tablet by mouth daily     calcium acetate(phosphat bind) 667 mg capsule  Commonly known as: PHOSLO  take 2 capsules by mouth 3 times daily with meals     carvediloL 6.25 MG tablet  Commonly known as: COREG  Take 1 tablet (6.25 mg total) by mouth 2 (two) times a day.     cetirizine 10 MG tablet  Commonly known as: ZYRTEC  Take 1 tablet every day by oral route.     clopidogreL 75 mg tablet  Commonly known as: PLAVIX  take 1 tablet by mouth daily     * DEXCOM G7 SENSOR Heather  Generic drug: blood-glucose sensor  CHANGE EVERY 10 DAYS     * DEXCOM G7 SENSOR Heather  Generic drug: blood-glucose sensor  Use as directed for CGM. Change sensor every 10 days     dorzolamide-timolol 2-0.5% 22.3-6.8 mg/mL ophthalmic solution  Commonly known as: COSOPT  place 1 drop in left eye twice a day  for 30 days   "   fluticasone propionate 50 mcg/actuation nasal spray  Commonly known as: FLONASE ALLERGY RELIEF  Spray 1 spray every day by intranasal route.     gabapentin 300 MG capsule  Commonly known as: NEURONTIN  Take 2 capsules twice a day by oral route for 90 days.     hydrALAZINE 50 MG tablet  Commonly known as: APRESOLINE  Take 1 tablet (50 mg total) by mouth every 8 (eight) hours.     * HYDROcodone-acetaminophen 5-325 mg per tablet  Commonly known as: NORCO  Take 1 tablet by mouth every 4 (four) hours as needed for Pain (flank pain).     * HYDROcodone-acetaminophen 7.5-325 mg per tablet  Commonly known as: NORCO  take 1 tablet by mouth 3 times daily as needed for pain, moderate to severe (4-10)     insulin aspart U-100 100 unit/mL (3 mL) Inpn pen  Commonly known as: NovoLOG  Inject 8 Units into the skin 3 (three) times daily with meals.     LANTUS SOLOSTAR U-100 INSULIN 100 unit/mL (3 mL) Inpn pen  Generic drug: insulin glargine U-100 (Lantus)  Inject 18 Units into the skin every evening.     levETIRAcetam 500 MG Tab  Commonly known as: KEPPRA  Take 1 tablet (500 mg total) by mouth 2 (two) times a day.     mirtazapine 15 MG tablet  Commonly known as: REMERON  Take 1 tablet (15 mg total) by mouth once daily at bedtime     pantoprazole 40 MG tablet  Commonly known as: PROTONIX  Take 1 tablet (40 mg total) by mouth once daily.     prednisoLONE acetate 1 % Drps  Commonly known as: PRED FORTE  Place 1 drop into the left eye 2 (two) times daily.     RENVELA 800 mg Tab  Generic drug: sevelamer carbonate  Take 1 tablet (800 mg total) by mouth 3 (three) times daily with meals.     sodium bicarbonate 650 MG tablet  Take 1 tablet (650 mg total) by mouth 3 (three) times daily.     sucralfate 1 gram tablet  Commonly known as: CARAFATE  Take 1 tablet (1 g total) by mouth 4 (four) times daily.     timolol maleate 0.5% 0.5 % Drop  Commonly known as: TIMOPTIC  Place 1 drop in left eye 2 times a day for 30 days     TRUE METRIX GLUCOSE  TEST STRIP Strp  Generic drug: blood sugar diagnostic  Use 1 strip to check blood glucose three times daily     TRUEPLUS LANCETS 33 gauge Misc  Generic drug: lancets  Use 1 to check blood glucose three times daily           * This list has 6 medication(s) that are the same as other medications prescribed for you. Read the directions carefully, and ask your doctor or other care provider to review them with you.                  Indwelling Lines/Drains at time of discharge:   Lines/Drains/Airways       Central Venous Catheter Line  Duration             Percutaneous Central Line - Triple Lumen  12/24/24 0700 Femoral Vein Right;Femoral Right 1 day              Drain  Duration                  Hemodialysis AV Fistula Left upper arm -- days                    Time spent on the discharge of patient: 31 minutes         Raymundo Parker MD  Department of Hospital Medicine  [facility]

## 2024-12-25 NOTE — PLAN OF CARE
Patient cleared for discharge from case management standpoint.       12/25/24 4544   Final Note   Assessment Type Final Discharge Note   Anticipated Discharge Disposition Home   Hospital Resources/Appts/Education Provided Provided patient/caregiver with written discharge plan information;Provided education on problems/symptoms using teachback   Post-Acute Status   Post-Acute Authorization Other   Other Status No Post-Acute Service Needs   Discharge Delays None known at this time

## 2024-12-31 ENCOUNTER — HOSPITAL ENCOUNTER (EMERGENCY)
Facility: HOSPITAL | Age: 34
Discharge: HOME | End: 2024-12-31
Attending: EMERGENCY MEDICINE
Payer: MEDICAID

## 2024-12-31 VITALS
OXYGEN SATURATION: 98 % | SYSTOLIC BLOOD PRESSURE: 128 MMHG | HEART RATE: 95 BPM | DIASTOLIC BLOOD PRESSURE: 81 MMHG | RESPIRATION RATE: 20 BRPM | HEIGHT: 62 IN | WEIGHT: 120 LBS | BODY MASS INDEX: 22.08 KG/M2 | TEMPERATURE: 98 F

## 2024-12-31 DIAGNOSIS — I10 PRIMARY HYPERTENSION: ICD-10-CM

## 2024-12-31 DIAGNOSIS — G89.29 OTHER CHRONIC PAIN: Primary | ICD-10-CM

## 2024-12-31 DIAGNOSIS — Z76.5 DRUG-SEEKING BEHAVIOR: ICD-10-CM

## 2024-12-31 PROCEDURE — 25000003 PHARM REV CODE 250

## 2024-12-31 PROCEDURE — 99283 EMERGENCY DEPT VISIT LOW MDM: CPT

## 2024-12-31 PROCEDURE — 25000003 PHARM REV CODE 250: Performed by: EMERGENCY MEDICINE

## 2024-12-31 RX ORDER — ONDANSETRON 4 MG/1
TABLET, ORALLY DISINTEGRATING ORAL
Status: COMPLETED
Start: 2024-12-31 | End: 2024-12-31

## 2024-12-31 RX ORDER — ACETAMINOPHEN 500 MG
1000 TABLET ORAL
Status: COMPLETED | OUTPATIENT
Start: 2024-12-31 | End: 2024-12-31

## 2024-12-31 RX ORDER — ONDANSETRON 4 MG/1
4 TABLET, ORALLY DISINTEGRATING ORAL
Status: COMPLETED | OUTPATIENT
Start: 2024-12-31 | End: 2024-12-31

## 2024-12-31 RX ORDER — CLONIDINE HYDROCHLORIDE 0.1 MG/1
0.2 TABLET ORAL
Status: DISCONTINUED | OUTPATIENT
Start: 2024-12-31 | End: 2024-12-31 | Stop reason: HOSPADM

## 2024-12-31 RX ADMIN — ONDANSETRON 4 MG: 4 TABLET, ORALLY DISINTEGRATING ORAL at 02:12

## 2024-12-31 RX ADMIN — ACETAMINOPHEN 1000 MG: 500 TABLET ORAL at 02:12

## 2024-12-31 NOTE — ED PROVIDER NOTES
"Encounter Date: 2024       History     Chief Complaint   Patient presents with    Back Pain     Patient with c/o back pain that has been ongoing since yesterday; States she was seen at [facility] yesterday for same     Patient presents to the emergency department for evaluation of kidney pain.  Patient has chronic kidney pain and has multiple ER visits across many emergency department's.  She has significant drug-seeking behavior.  Patient states the only thing that makes her pain better is Dilaudid.  She of course, is allergic to everything else.  She has not taken anything for her pain today.  She denies any fevers or chills.  She has had no nausea or vomiting.  She denies any other complaints.    The history is provided by the patient.     Review of patient's allergies indicates:   Allergen Reactions    Droperidol Hives    Haldol [haloperidol lactate] Hives    Penicillins Hives    Droperidol (bulk) Anxiety     STATES "FREAKS OUT".  TOLERATES HALDOL PRIOR TO ARRIVAL AT ANOTHER FACILITY TODAY.      Past Medical History:   Diagnosis Date    ESRD on hemodialysis 2022    Gastritis 2022    EGD was 21    Gastroparesis 2022    has not had Emptying study    Heart failure with preserved ejection fraction 2022    EF 55% on 3/22    History of supraventricular tachycardia     Hyperkalemia 2022    Hypertensive emergency 2022    Sickle cell trait 2022    Type 2 diabetes mellitus      Past Surgical History:   Procedure Laterality Date     SECTION      x 3    COLONOSCOPY      COLONOSCOPY N/A 2022    Procedure: COLONOSCOPY;  Surgeon: Jagdeep Cedeno MD;  Location: [facility];  Service: Endoscopy;  Laterality: N/A;    DESTRUCTION, CILIARY BODY, USING LASER Left 2024    Procedure: Cyclophotocoagulation G-probe, retrobulbar chlorpromazine left eye;  Surgeon: Fidel Wise MD;  Location: [address];  Service: Ophthalmology;  Laterality: Left;    ENDOSCOPIC " ULTRASOUND OF UPPER GASTROINTESTINAL TRACT N/A 12/16/2023    Procedure: ULTRASOUND, UPPER GI TRACT, ENDOSCOPIC;  Surgeon: Micky Paredes III, MD;  Location: [facility];  Service: Endoscopy;  Laterality: N/A;    ESOPHAGOGASTRODUODENOSCOPY N/A 10/18/2019    Procedure: ESOPHAGOGASTRODUODENOSCOPY (EGD);  Surgeon: Gianluca Mendez MD;  Location: [facility];  Service: Endoscopy;  Laterality: N/A;    ESOPHAGOGASTRODUODENOSCOPY N/A 08/24/2022    Procedure: EGD (ESOPHAGOGASTRODUODENOSCOPY);  Surgeon: Micky Paredes III, MD;  Location: [facility];  Service: Endoscopy;  Laterality: N/A;    ESOPHAGOGASTRODUODENOSCOPY N/A 12/05/2022    Procedure: EGD (ESOPHAGOGASTRODUODENOSCOPY);  Surgeon: Marcelo Zhong MD;  Location: [facility];  Service: Endoscopy;  Laterality: N/A;    ESOPHAGOGASTRODUODENOSCOPY N/A 9/13/2024    Procedure: EGD (ESOPHAGOGASTRODUODENOSCOPY);  Surgeon: Marcelo Zhong MD;  Location: [facility];  Service: Endoscopy;  Laterality: N/A;    EYE SURGERY      INSERTION, CATHETER, TUNNELED N/A 06/17/2023    Procedure: Insertion,catheter,tunneled;  Surgeon: Carlos Thurman Jr., MD;  Location: [facility];  Service: General;  Laterality: N/A;    LAPAROSCOPIC CHOLECYSTECTOMY N/A 07/30/2022    Procedure: CHOLECYSTECTOMY, LAPAROSCOPIC;  Surgeon: Grey Perez MD;  Location: [facility] OR;  Service: General;  Laterality: N/A;    PLACEMENT OF DUAL-LUMEN VASCULAR CATHETER Left 07/12/2022    Procedure: INSERTION-CATHETER-JOSEPH;  Surgeon: Dionte Gan MD;  Location: [facility] OR;  Service: General;  Laterality: Left;    PLACEMENT OF DUAL-LUMEN VASCULAR CATHETER Right 07/26/2022    Procedure: INSERTION-CATHETER-Hemosplit;  Surgeon: Dionte Gan MD;  Location: [facility] OR;  Service: General;  Laterality: Right;     Family History   Problem Relation Name Age of Onset    Diabetes Mother      Diabetes Father       Social History     Tobacco Use    Smoking status: Never    Smokeless tobacco: Never   Substance Use Topics     Alcohol use: Not Currently    Drug use: No     Review of Systems   Constitutional:  Negative for activity change, appetite change, chills and fever.   HENT:  Negative for congestion, ear discharge, ear pain, nosebleeds, sore throat and voice change.    Eyes:  Negative for photophobia, pain and discharge.   Respiratory:  Negative for cough, chest tightness, shortness of breath and wheezing.    Cardiovascular:  Negative for chest pain and palpitations.   Gastrointestinal:  Negative for abdominal pain, constipation, nausea and vomiting.   Genitourinary:  Positive for flank pain. Negative for dysuria, frequency and urgency.   Musculoskeletal:  Negative for back pain and neck pain.   Skin:  Negative for rash and wound.   Neurological:  Negative for dizziness, seizures, weakness and headaches.   All other systems reviewed and are negative.      Physical Exam     Initial Vitals   BP Pulse Resp Temp SpO2   12/31/24 0201 12/31/24 0200 12/31/24 0201 12/31/24 0201 12/31/24 0200   (!) 215/113 88 20 98 °F (36.7 °C) 100 %      MAP       --                Physical Exam    Nursing note and vitals reviewed.  Constitutional: She appears well-developed and well-nourished.   HENT:   Head: Normocephalic and atraumatic.   Right Ear: External ear normal.   Left Ear: External ear normal.   Nose: Nose normal. Mouth/Throat: Oropharynx is clear and moist.   Eyes: Conjunctivae are normal.   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 No tracheal deviation present.   Normal range of motion.  Cardiovascular:  Normal rate, regular rhythm and normal heart sounds.           Pulmonary/Chest: Breath sounds normal. She has no wheezes.   Abdominal: Abdomen is soft. Bowel sounds are normal. There is no abdominal tenderness.   Musculoskeletal:         General: No tenderness. Normal range of motion.      Cervical back: Normal range of motion and neck supple.     Neurological: She is alert and oriented to person, place,  and time. She has normal reflexes.   Skin: Skin is warm and dry. Capillary refill takes less than 2 seconds. No rash noted.         ED Course   Procedures  Labs Reviewed - No data to display       Imaging Results    None          Medications   cloNIDine tablet 0.2 mg (0.2 mg Oral Not Given 12/31/24 0215)   acetaminophen tablet 1,000 mg (1,000 mg Oral Given 12/31/24 0220)   ondansetron disintegrating tablet 4 mg (4 mg Oral Given 12/31/24 0220)     Medical Decision Making  History is obtained from the patient.  Patient has many previous ER visits for pain medicine were reviewed by myself.  Patient's lab results from 4 days ago were also reviewed by myself.  Patient has no decreased access to healthcare.    Patient is scheduled for dialysis tomorrow.  Review of her records reveals drug-seeking behavior.  She is not tachycardic in the emergency department with a heart rate of 88, 100% oxygen saturations and an elevated blood pressure.  We will treat the patient's blood pressure in the emergency department and offer some Tylenol for her kidney pain.    Risk  OTC drugs.  Prescription drug management.                                      Clinical Impression:  Final diagnoses:  [G89.29] Other chronic pain (Primary)  [Z76.5] Drug-seeking behavior  [I10] Primary hypertension          ED Disposition Condition    Discharge Stable          ED Prescriptions    None       Follow-up Information       Follow up With Specialties Details Why Contact Info    Melony Kim NP Family Medicine Schedule an appointment as soon as possible for a visit   [facility] [place] [id number]  [phone]               Osbaldo Vargas, DO  12/31/24 3834

## 2024-12-31 NOTE — DISCHARGE INSTRUCTIONS
Continue home medications.  Be sure to make it to your dialysis appointments.  Follow up with your primary care provider regarding your chronic pain.

## 2025-01-03 LAB
OHS QRS DURATION: 104 MS
OHS QTC CALCULATION: 559 MS

## 2025-01-06 ENCOUNTER — HOSPITAL ENCOUNTER (EMERGENCY)
Facility: HOSPITAL | Age: 35
Discharge: HOME | End: 2025-01-06
Attending: EMERGENCY MEDICINE
Payer: MEDICAID

## 2025-01-06 VITALS
HEART RATE: 111 BPM | WEIGHT: 117 LBS | OXYGEN SATURATION: 99 % | DIASTOLIC BLOOD PRESSURE: 95 MMHG | TEMPERATURE: 98 F | HEIGHT: 62 IN | BODY MASS INDEX: 21.53 KG/M2 | RESPIRATION RATE: 24 BRPM | SYSTOLIC BLOOD PRESSURE: 177 MMHG

## 2025-01-06 DIAGNOSIS — R10.9 CHRONIC ABDOMINAL PAIN: Primary | ICD-10-CM

## 2025-01-06 DIAGNOSIS — G89.29 CHRONIC ABDOMINAL PAIN: Primary | ICD-10-CM

## 2025-01-06 PROCEDURE — 96372 THER/PROPH/DIAG INJ SC/IM: CPT | Performed by: EMERGENCY MEDICINE

## 2025-01-06 PROCEDURE — 99284 EMERGENCY DEPT VISIT MOD MDM: CPT | Mod: 25

## 2025-01-06 PROCEDURE — 63600175 PHARM REV CODE 636 W HCPCS: Performed by: EMERGENCY MEDICINE

## 2025-01-06 RX ORDER — HYDROMORPHONE HYDROCHLORIDE 1 MG/ML
1 INJECTION, SOLUTION INTRAMUSCULAR; INTRAVENOUS; SUBCUTANEOUS
Status: COMPLETED | OUTPATIENT
Start: 2025-01-06 | End: 2025-01-06

## 2025-01-06 RX ORDER — HALOPERIDOL 5 MG/ML
5 INJECTION INTRAMUSCULAR
Status: COMPLETED | OUTPATIENT
Start: 2025-01-06 | End: 2025-01-06

## 2025-01-06 RX ADMIN — HYDROMORPHONE HYDROCHLORIDE 1 MG: 1 INJECTION, SOLUTION INTRAMUSCULAR; INTRAVENOUS; SUBCUTANEOUS at 12:01

## 2025-01-06 RX ADMIN — HALOPERIDOL LACTATE 5 MG: 5 INJECTION, SOLUTION INTRAMUSCULAR at 12:01

## 2025-01-06 NOTE — ED PROVIDER NOTES
"   History     Chief Complaint   Patient presents with    Abdominal Pain     Abdominal pain onset today      HPI:  Tabby Howard is a 35 y.o. female with PMH as below who presents to the [facility] emergency department for evaluation of recurrence of her chronic left sided abdominal pain for which she has been seen at this and other EDs numerous times for. She has still not followed up with GI as recommended in the past. She has no other complaints.       PCP: Melony Kim NP    Review of patient's allergies indicates:   Allergen Reactions    Droperidol Hives    Haldol [haloperidol lactate] Hives    Penicillins Hives    Droperidol (bulk) Anxiety     STATES "FREAKS OUT".  TOLERATES HALDOL PRIOR TO ARRIVAL AT ANOTHER FACILITY TODAY.       Past Medical History:   Diagnosis Date    ESRD on hemodialysis 2022    Gastritis 2022    EGD was 22    Gastroparesis 2022    has not had Emptying study    Heart failure with preserved ejection fraction 2022    EF 55% on 3/22    History of supraventricular tachycardia     Hyperkalemia 2022    Hypertensive emergency 2022    Sickle cell trait 2022    Type 2 diabetes mellitus      Past Surgical History:   Procedure Laterality Date     SECTION      x 3    COLONOSCOPY      COLONOSCOPY N/A 2022    Procedure: COLONOSCOPY;  Surgeon: Jagdeep Cedeno MD;  Location: [facility];  Service: Endoscopy;  Laterality: N/A;    DESTRUCTION, CILIARY BODY, USING LASER Left 2024    Procedure: Cyclophotocoagulation G-probe, retrobulbar chlorpromazine left eye;  Surgeon: Fidel Wise MD;  Location: [address];  Service: Ophthalmology;  Laterality: Left;    ENDOSCOPIC ULTRASOUND OF UPPER GASTROINTESTINAL TRACT N/A 2023    Procedure: ULTRASOUND, UPPER GI TRACT, ENDOSCOPIC;  Surgeon: Micky Paredes III, MD;  Location: [facility];  Service: Endoscopy;  Laterality: N/A;    ESOPHAGOGASTRODUODENOSCOPY N/A 10/18/2019    " Procedure: ESOPHAGOGASTRODUODENOSCOPY (EGD);  Surgeon: Gianluca Mendez MD;  Location: [facility] ENDO;  Service: Endoscopy;  Laterality: N/A;    ESOPHAGOGASTRODUODENOSCOPY N/A 08/24/2022    Procedure: EGD (ESOPHAGOGASTRODUODENOSCOPY);  Surgeon: Micky Paredes III, MD;  Location: [facility] ENDO;  Service: Endoscopy;  Laterality: N/A;    ESOPHAGOGASTRODUODENOSCOPY N/A 12/05/2022    Procedure: EGD (ESOPHAGOGASTRODUODENOSCOPY);  Surgeon: Marcelo Zhong MD;  Location: [facility];  Service: Endoscopy;  Laterality: N/A;    ESOPHAGOGASTRODUODENOSCOPY N/A 9/13/2024    Procedure: EGD (ESOPHAGOGASTRODUODENOSCOPY);  Surgeon: Marcelo Zhong MD;  Location: [facility];  Service: Endoscopy;  Laterality: N/A;    EYE SURGERY      INSERTION, CATHETER, TUNNELED N/A 06/17/2023    Procedure: Insertion,catheter,tunneled;  Surgeon: Carlos Thurman Jr., MD;  Location: [facility];  Service: General;  Laterality: N/A;    LAPAROSCOPIC CHOLECYSTECTOMY N/A 07/30/2022    Procedure: CHOLECYSTECTOMY, LAPAROSCOPIC;  Surgeon: Grey Perez MD;  Location: [facility] OR;  Service: General;  Laterality: N/A;    PLACEMENT OF DUAL-LUMEN VASCULAR CATHETER Left 07/12/2022    Procedure: INSERTION-CATHETER-JOSEPH;  Surgeon: Dionte Gan MD;  Location: [facility] OR;  Service: General;  Laterality: Left;    PLACEMENT OF DUAL-LUMEN VASCULAR CATHETER Right 07/26/2022    Procedure: INSERTION-CATHETER-Hemosplit;  Surgeon: Dionte aGn MD;  Location: [facility] OR;  Service: General;  Laterality: Right;       Family History   Problem Relation Name Age of Onset    Diabetes Mother      Diabetes Father       Social History     Tobacco Use    Smoking status: Never    Smokeless tobacco: Never   Substance and Sexual Activity    Alcohol use: Not Currently    Drug use: No    Sexual activity: Not Currently     Partners: Male     Birth control/protection: I.U.D.      Review of Systems     Review of Systems   Constitutional: Negative.  Negative for fever.   HENT: Negative.     Eyes:  "Negative.    Respiratory: Negative.     Cardiovascular: Negative.    Gastrointestinal: Negative.    Endocrine: Negative.    Genitourinary: Negative.    Musculoskeletal: Negative.    Skin: Negative.    Allergic/Immunologic: Negative.    Neurological: Negative.    Hematological: Negative.    Psychiatric/Behavioral: Negative.     All other systems reviewed and are negative.       Physical Exam     Initial Vitals [01/06/25 0042]   BP Pulse Resp Temp SpO2   (!) 177/95 (!) 111 (!) 26 97.9 °F (36.6 °C) 99 %      MAP       --          Nursing notes and vital signs reviewed.  Constitutional: Patient is in moderate to severe distress, shaking side to side during exam.   Head: Normocephalic. Atraumatic.   Eyes:  Conjunctivae are not pale. No scleral icterus.   ENT: Mucous membranes moist.   Neck: Supple.   Cardiovascular: Regular rate. Regular rhythm.   Pulmonary: No respiratory distress.   Abdominal: Soft, nondistended, nontender.    Musculoskeletal: Moves all extremities. No obvious deformities.   Skin: Warm and dry.   Neurological:  Alert, awake, and appropriate. Normal speech. No acute lateralizing neurologic deficits appreciated.   Psychiatric: Normal affect.       ED Course   Procedures  Vitals:    01/06/25 0042 01/06/25 0057   BP: (!) 177/95    Pulse: (!) 111    Resp: (!) 26 (!) 24   Temp: 97.9 °F (36.6 °C)    TempSrc: Oral    SpO2: 99%    Weight: 53.1 kg (117 lb)    Height: 5' 2" (1.575 m)      Lab Results Interpreted as Abnormal:  Labs Reviewed - No data to display   All Lab Results:  Results for orders placed or performed during the hospital encounter of 12/24/24   CBC auto differential    Collection Time: 12/24/24  4:49 AM   Result Value Ref Range    WBC 4.81 3.90 - 12.70 K/uL    RBC 1.98 (L) 4.00 - 5.40 M/uL    Hemoglobin 5.6 (LL) 12.0 - 16.0 g/dL    Hematocrit 18.0 (LL) 37.0 - 48.5 %    MCV 91 82 - 98 fL    MCH 28.3 27.0 - 31.0 pg    MCHC 31.1 (L) 32.0 - 36.0 g/dL    RDW 17.2 (H) 11.5 - 14.5 %    Platelets 51 (L) " 150 - 450 K/uL    MPV 11.4 9.2 - 12.9 fL    Immature Granulocytes 0.8 (H) 0.0 - 0.5 %    Gran # (ANC) 4.2 1.8 - 7.7 K/uL    Immature Grans (Abs) 0.04 0.00 - 0.04 K/uL    Lymph # 0.3 (L) 1.0 - 4.8 K/uL    Mono # 0.2 (L) 0.3 - 1.0 K/uL    Eos # 0.0 0.0 - 0.5 K/uL    Baso # 0.02 0.00 - 0.20 K/uL    nRBC 0 0 /100 WBC    Gran % 87.2 (H) 38.0 - 73.0 %    Lymph % 6.4 (L) 18.0 - 48.0 %    Mono % 4.4 4.0 - 15.0 %    Eosinophil % 0.8 0.0 - 8.0 %    Basophil % 0.4 0.0 - 1.9 %    Platelet Estimate Decreased (A)     Differential Method Automated    Comprehensive metabolic panel    Collection Time: 12/24/24  4:49 AM   Result Value Ref Range    Sodium 139 136 - 145 mmol/L    Potassium 7.1 (HH) 3.5 - 5.1 mmol/L    Chloride 106 95 - 110 mmol/L    CO2 12 (L) 23 - 29 mmol/L    Glucose 305 (H) 70 - 110 mg/dL     (H) 6 - 20 mg/dL    Creatinine 16.9 (H) 0.5 - 1.4 mg/dL    Calcium 8.6 (L) 8.7 - 10.5 mg/dL    Total Protein 7.5 6.0 - 8.4 g/dL    Albumin 3.9 3.5 - 5.2 g/dL    Total Bilirubin 1.7 (H) 0.1 - 1.0 mg/dL    Alkaline Phosphatase 196 (H) 40 - 150 U/L    AST 19 10 - 40 U/L    ALT 24 10 - 44 U/L    eGFR 3 (A) >60 mL/min/1.73 m^2    Anion Gap 21 (H) 8 - 16 mmol/L   Type & Screen    Collection Time: 12/24/24  5:54 AM   Result Value Ref Range    Group & Rh A POS     Indirect Jemal NEG     Specimen Outdate 12/27/2024 23:59    Prepare RBC 2 Units; low H&H    Collection Time: 12/24/24  5:54 AM   Result Value Ref Range    UNIT NUMBER [id number]     Product Code G5579O72     DISPENSE STATUS TRANSFUSED     CODING SYSTEM WPXD067     Unit Blood Type Code 6200     Unit Blood Type A POS     Unit Expiration [id number]     CROSSMATCH INTERPRETATION Compatible     UNIT NUMBER [id number]     Product Code W4112I46     DISPENSE STATUS TRANSFUSED     CODING SYSTEM [id number]     Unit Blood Type Code 6200     Unit Blood Type A POS     Unit Expiration [id number]     CROSSMATCH INTERPRETATION Compatible    EKG 12-lead    Collection Time:  12/24/24  7:40 AM   Result Value Ref Range    QRS Duration 104 ms    [facility] QTC Calculation 559 ms   CBC Auto Differential    Collection Time: 12/24/24  1:32 PM   Result Value Ref Range    WBC 4.68 3.90 - 12.70 K/uL    RBC 2.86 (L) 4.00 - 5.40 M/uL    Hemoglobin 8.4 (L) 12.0 - 16.0 g/dL    Hematocrit 23.9 (L) 37.0 - 48.5 %    MCV 84 82 - 98 fL    MCH 29.4 27.0 - 31.0 pg    MCHC 35.1 32.0 - 36.0 g/dL    RDW 15.4 (H) 11.5 - 14.5 %    Platelets 59 (L) 150 - 450 K/uL    MPV 9.9 9.2 - 12.9 fL    Immature Granulocytes 1.1 (H) 0.0 - 0.5 %    Gran # (ANC) 4.1 1.8 - 7.7 K/uL    Immature Grans (Abs) 0.05 (H) 0.00 - 0.04 K/uL    Lymph # 0.3 (L) 1.0 - 4.8 K/uL    Mono # 0.2 (L) 0.3 - 1.0 K/uL    Eos # 0.0 0.0 - 0.5 K/uL    Baso # 0.00 0.00 - 0.20 K/uL    nRBC 0 0 /100 WBC    Gran % 88.3 (H) 38.0 - 73.0 %    Lymph % 6.8 (L) 18.0 - 48.0 %    Mono % 3.8 (L) 4.0 - 15.0 %    Eosinophil % 0.0 0.0 - 8.0 %    Basophil % 0.0 0.0 - 1.9 %    Differential Method Automated    Renal Function Panel    Collection Time: 12/24/24  1:32 PM   Result Value Ref Range    Glucose 118 (H) 70 - 110 mg/dL    Sodium 140 136 - 145 mmol/L    Potassium 3.1 (L) 3.5 - 5.1 mmol/L    Chloride 95 95 - 110 mmol/L    CO2 23 23 - 29 mmol/L    BUN 32 (H) 6 - 20 mg/dL    Calcium 9.7 8.7 - 10.5 mg/dL    Creatinine 5.5 (H) 0.5 - 1.4 mg/dL    Albumin 4.2 3.5 - 5.2 g/dL    Phosphorus 3.0 2.7 - 4.5 mg/dL    eGFR 10 (A) >60 mL/min/1.73 m^2    Anion Gap 22 (H) 8 - 16 mmol/L   POCT glucose    Collection Time: 12/24/24  3:50 PM   Result Value Ref Range    POCT Glucose 129 (H) 70 - 110 mg/dL   POCT glucose    Collection Time: 12/24/24  9:05 PM   Result Value Ref Range    POCT Glucose 123 (H) 70 - 110 mg/dL   Comprehensive Metabolic Panel (CMP)    Collection Time: 12/25/24  3:19 AM   Result Value Ref Range    Sodium 140 136 - 145 mmol/L    Potassium 4.1 3.5 - 5.1 mmol/L    Chloride 98 95 - 110 mmol/L    CO2 22 (L) 23 - 29 mmol/L    Glucose 115 (H) 70 - 110 mg/dL    BUN 52 (H) 6 -  20 mg/dL    Creatinine 9.3 (H) 0.5 - 1.4 mg/dL    Calcium 8.5 (L) 8.7 - 10.5 mg/dL    Total Protein 6.5 6.0 - 8.4 g/dL    Albumin 3.2 (L) 3.5 - 5.2 g/dL    Total Bilirubin 2.3 (H) 0.1 - 1.0 mg/dL    Alkaline Phosphatase 178 (H) 40 - 150 U/L    AST 23 10 - 40 U/L    ALT 16 10 - 44 U/L    eGFR 5 (A) >60 mL/min/1.73 m^2    Anion Gap 20 (H) 8 - 16 mmol/L   Magnesium    Collection Time: 12/25/24  3:19 AM   Result Value Ref Range    Magnesium 2.4 1.6 - 2.6 mg/dL   Phosphorus    Collection Time: 12/25/24  3:19 AM   Result Value Ref Range    Phosphorus 6.6 (H) 2.7 - 4.5 mg/dL   CBC with Automated Differential    Collection Time: 12/25/24  3:19 AM   Result Value Ref Range    WBC 4.19 3.90 - 12.70 K/uL    RBC 2.55 (L) 4.00 - 5.40 M/uL    Hemoglobin 7.2 (L) 12.0 - 16.0 g/dL    Hematocrit 21.9 (L) 37.0 - 48.5 %    MCV 86 82 - 98 fL    MCH 28.2 27.0 - 31.0 pg    MCHC 32.9 32.0 - 36.0 g/dL    RDW 16.1 (H) 11.5 - 14.5 %    Platelets 66 (L) 150 - 450 K/uL    MPV 10.4 9.2 - 12.9 fL    Immature Granulocytes 0.7 (H) 0.0 - 0.5 %    Gran # (ANC) 3.2 1.8 - 7.7 K/uL    Immature Grans (Abs) 0.03 0.00 - 0.04 K/uL    Lymph # 0.6 (L) 1.0 - 4.8 K/uL    Mono # 0.3 0.3 - 1.0 K/uL    Eos # 0.0 0.0 - 0.5 K/uL    Baso # 0.01 0.00 - 0.20 K/uL    nRBC 0 0 /100 WBC    Gran % 77.4 (H) 38.0 - 73.0 %    Lymph % 13.1 (L) 18.0 - 48.0 %    Mono % 7.6 4.0 - 15.0 %    Eosinophil % 1.0 0.0 - 8.0 %    Basophil % 0.2 0.0 - 1.9 %    Differential Method Automated      *Note: Due to a large number of results and/or encounters for the requested time period, some results have not been displayed. A complete set of results can be found in Results Review.     Imaging Results    None          The emergency physician reviewed the vital signs / test results outlined above.     ED Discussion      Patient's evaluation in the ED does not suggest any emergent or life-threatening medical conditions requiring immediate intervention beyond what was provided in the ED, and I  believe patient is safe for discharge. Regardless, an unremarkable evaluation in the ED does not preclude the development or presence of a serious or life-threatening condition. As such, patient was given return instructions for any change or worsening of symptoms.       ED Medication(s) Administered:  Medications   HYDROmorphone injection 1 mg (1 mg Intramuscular Given 1/6/25 0057)   haloperidol lactate injection 5 mg (5 mg Intramuscular Given 1/6/25 0057)       Prescription Management: I performed a review of the patient's current Rx medication list as input by nursing staff.    Discharge Medication List as of 1/6/2025 12:49 AM        CONTINUE these medications which have NOT CHANGED    Details   apixaban (ELIQUIS) 5 mg Tab Take 1 tablet (5 mg total) by mouth 2 (two) times daily. Patient w/ anemia, so held during admission, follow-up with hematologist about restarting, Starting Wed 10/23/2024, No Print      aspirin 325 MG tablet Take 1 tablet (325 mg total) by mouth once daily., Starting Wed 11/27/2024, Normal      atropine 1% (ISOPTO ATROPINE) 1 % Drop Place 1 drop into the left eye 2 (two) times a day., Starting Thu 3/14/2024, Normal      blood sugar diagnostic (TRUE METRIX GLUCOSE TEST STRIP) Strp Use 1 strip to check blood glucose three times daily, Starting Thu 10/5/2023, Normal      blood-glucose meter (TRUE METRIX GLUCOSE METER) Misc Use to check blood glucose, Starting Sat 8/31/2024, Normal      blood-glucose meter,continuous Misc USE WITH SENSORS, Starting Sat 8/31/2024, Normal      !! blood-glucose sensor (DEXCOM G7 SENSOR) Heather Use as directed for CGM. Change sensor every 10 days, Starting Sat 8/31/2024, Normal      !! blood-glucose sensor Heather CHANGE EVERY 10 DAYS, Starting Wed 2/28/2024, Normal      brimonidine 0.15 % OPTH DROP (ALPHAGAN) 0.15 % ophthalmic solution Place 1 drop into both eyes 3 (three) times daily., Starting Wed 2/28/2024, Until Thu 2/27/2025, Normal      brinzolamide (AZOPT) 1 %  ophthalmic suspension Place1 drop in left eye 3 times a day for 30 days, Starting Fri 5/31/2024, Normal      !! busPIRone (BUSPAR) 10 MG tablet Take 1 tablet (10 mg total) by mouth 3 (three) times daily as needed (anxiety)., Starting Wed 2/28/2024, Until Thu 2/27/2025 at 2359, Normal      !! busPIRone (BUSPAR) 5 MG Tab take 1 tablet by mouth daily, Starting Wed 11/27/2024, Normal      calcium acetate,phosphat bind, (PHOSLO) 667 mg capsule take 2 capsules by mouth 3 times daily with meals, Starting Wed 11/27/2024, Normal      carvediloL (COREG) 6.25 MG tablet Take 1 tablet (6.25 mg total) by mouth 2 (two) times a day., Starting Wed 11/27/2024, Normal      cetirizine (ZYRTEC) 10 MG tablet Take 1 tablet every day by oral route., Starting Wed 2/21/2024, Normal      clopidogreL (PLAVIX) 75 mg tablet take 1 tablet by mouth daily, Starting Wed 11/27/2024, Normal      dorzolamide-timolol 2-0.5% (COSOPT) 22.3-6.8 mg/mL ophthalmic solution place 1 drop in left eye twice a day  for 30 days, Starting Tue 4/9/2024, Normal      fluticasone propionate (FLONASE ALLERGY RELIEF) 50 mcg/actuation nasal spray [place] 1 spray every day by intranasal route., Starting Mon 12/11/2023, Normal      gabapentin (NEURONTIN) 300 MG capsule Take 2 capsules twice a day by oral route for 90 days., Starting Tue 8/20/2024, Normal      hydrALAZINE (APRESOLINE) 50 MG tablet Take 1 tablet (50 mg total) by mouth every 8 (eight) hours., Starting Mon 9/30/2024, Until Tue 9/30/2025, No Print      HYDROcodone-acetaminophen (NORCO) 5-325 mg per tablet Take 1 tablet by mouth every 4 (four) hours as needed for Pain (flank pain)., Starting Tue 9/24/2024, Normal      HYDROcodone-acetaminophen (NORCO) 7.5-325 mg per tablet take 1 tablet by mouth 3 times daily as needed for pain, moderate to severe (4-10), Starting Wed 11/27/2024, Normal      insulin aspart U-100 (NOVOLOG) 100 unit/mL (3 mL) InPn pen Inject 8 Units into the skin 3 (three) times daily with meals.,  "Starting Mon 8/26/2024, Normal      insulin glargine U-100, Lantus, (LANTUS SOLOSTAR U-100 INSULIN) 100 unit/mL (3 mL) InPn pen Inject 18 Units into the skin every evening., Historical Med      lancets (TRUEPLUS LANCETS) 33 gauge Misc Use 1 to check blood glucose three times daily, Starting Thu 10/5/2023, Normal      levETIRAcetam (KEPPRA) 500 MG Tab Take 1 tablet (500 mg total) by mouth 2 (two) times a day., Starting Tue 10/1/2024, Normal      mirtazapine (REMERON) 15 MG tablet Take 1 tablet (15 mg total) by mouth once daily at bedtime, Starting Wed 11/27/2024, Normal      pantoprazole (PROTONIX) 40 MG tablet Take 1 tablet (40 mg total) by mouth once daily., Starting Sun 8/25/2024, Until Tue 9/24/2024, Normal      pen needle, diabetic 31 gauge x 3/16" Ndle Use as directed with insulin once daily, Starting Wed 2/28/2024, Normal      prednisoLONE acetate (PRED FORTE) 1 % DrpS Place 1 drop into the left eye 2 (two) times daily., Starting Thu 3/14/2024, Normal      rosuvastatin (CRESTOR) 5 MG tablet Take 2 tablets (10 mg total) by mouth once daily at bedtime, Starting Wed 11/27/2024, Normal      sevelamer carbonate (RENVELA) 800 mg Tab Take 1 tablet (800 mg total) by mouth 3 (three) times daily with meals., Starting Wed 2/28/2024, Normal      sodium bicarbonate 650 MG tablet Take 1 tablet (650 mg total) by mouth 3 (three) times daily., Starting Mon 9/30/2024, Until Tue 9/30/2025, Normal      sucralfate (CARAFATE) 1 gram tablet Take 1 tablet (1 g total) by mouth 4 (four) times daily., Starting Mon 8/5/2024, Normal      timolol maleate 0.5% (TIMOPTIC) 0.5 % Drop Place 1 drop in left eye 2 times a day for 30 days, Starting Fri 5/31/2024, Normal       !! - Potential duplicate medications found. Please discuss with provider.            Follow-up Information       Melony Kim NP. Schedule an appointment as soon as possible for a visit in 1 day.    Specialty: Family Medicine  Contact information:  [address]  [place] [address]  [phone]               [facility] Emergency Dept.    Specialty: Emergency Medicine  Why: As needed, If symptoms worsen  Contact information:  [address] [facility] [address] [phone]                          Clinical Impression       ICD-10-CM ICD-9-CM   1. Chronic abdominal pain  R10.9 789.00    G89.29 338.29      ED Disposition Condition    Discharge Stable             Akin Flores MD  01/06/25 0219

## 2025-01-12 ENCOUNTER — HOSPITAL ENCOUNTER (EMERGENCY)
Facility: HOSPITAL | Age: 35
Discharge: ANOTHER HOSPITAL | End: 2025-01-12
Attending: STUDENT IN AN ORGANIZED HEALTH CARE EDUCATION/TRAINING PROGRAM
Payer: MEDICAID

## 2025-01-12 VITALS
OXYGEN SATURATION: 96 % | HEART RATE: 88 BPM | DIASTOLIC BLOOD PRESSURE: 58 MMHG | SYSTOLIC BLOOD PRESSURE: 170 MMHG | BODY MASS INDEX: 21.53 KG/M2 | HEIGHT: 62 IN | RESPIRATION RATE: 20 BRPM | TEMPERATURE: 98 F | WEIGHT: 117 LBS

## 2025-01-12 DIAGNOSIS — R10.9 FLANK PAIN: ICD-10-CM

## 2025-01-12 DIAGNOSIS — R05.9 COUGH: ICD-10-CM

## 2025-01-12 DIAGNOSIS — E87.5 HYPERKALEMIA: ICD-10-CM

## 2025-01-12 DIAGNOSIS — Z99.2 END STAGE RENAL DISEASE ON DIALYSIS: Primary | ICD-10-CM

## 2025-01-12 DIAGNOSIS — N18.6 END STAGE RENAL DISEASE ON DIALYSIS: Primary | ICD-10-CM

## 2025-01-12 LAB
ALBUMIN SERPL BCP-MCNC: 3.2 G/DL (ref 3.5–5.2)
ALP SERPL-CCNC: 116 U/L (ref 40–150)
ALT SERPL W/O P-5'-P-CCNC: 9 U/L (ref 10–44)
ANION GAP SERPL CALC-SCNC: 22 MMOL/L (ref 8–16)
AST SERPL-CCNC: 15 U/L (ref 10–40)
BASOPHILS # BLD AUTO: 0.02 K/UL (ref 0–0.2)
BASOPHILS NFR BLD: 0.3 % (ref 0–1.9)
BILIRUB SERPL-MCNC: 1.8 MG/DL (ref 0.1–1)
BNP SERPL-MCNC: 2448 PG/ML (ref 0–99)
BUN SERPL-MCNC: 109 MG/DL (ref 6–20)
CALCIUM SERPL-MCNC: 9.1 MG/DL (ref 8.7–10.5)
CHLORIDE SERPL-SCNC: 106 MMOL/L (ref 95–110)
CO2 SERPL-SCNC: 11 MMOL/L (ref 23–29)
CREAT SERPL-MCNC: 15.2 MG/DL (ref 0.5–1.4)
DIFFERENTIAL METHOD BLD: ABNORMAL
EOSINOPHIL # BLD AUTO: 0.1 K/UL (ref 0–0.5)
EOSINOPHIL NFR BLD: 1 % (ref 0–8)
ERYTHROCYTE [DISTWIDTH] IN BLOOD BY AUTOMATED COUNT: 16.8 % (ref 11.5–14.5)
EST. GFR  (NO RACE VARIABLE): 2.9 ML/MIN/1.73 M^2
GLUCOSE SERPL-MCNC: 114 MG/DL (ref 70–110)
HCT VFR BLD AUTO: 24.3 % (ref 37–48.5)
HGB BLD-MCNC: 8.1 G/DL (ref 12–16)
IMM GRANULOCYTES # BLD AUTO: 0.02 K/UL (ref 0–0.04)
IMM GRANULOCYTES NFR BLD AUTO: 0.3 % (ref 0–0.5)
LYMPHOCYTES # BLD AUTO: 0.6 K/UL (ref 1–4.8)
LYMPHOCYTES NFR BLD: 8.4 % (ref 18–48)
MCH RBC QN AUTO: 28.4 PG (ref 27–31)
MCHC RBC AUTO-ENTMCNC: 33.3 G/DL (ref 32–36)
MCV RBC AUTO: 85 FL (ref 82–98)
MONOCYTES # BLD AUTO: 0.6 K/UL (ref 0.3–1)
MONOCYTES NFR BLD: 9.3 % (ref 4–15)
NEUTROPHILS # BLD AUTO: 5.5 K/UL (ref 1.8–7.7)
NEUTROPHILS NFR BLD: 80.7 % (ref 38–73)
NRBC BLD-RTO: 0 /100 WBC
PHOSPHATE SERPL-MCNC: 8.8 MG/DL (ref 2.7–4.5)
PLATELET # BLD AUTO: 68 K/UL (ref 150–450)
PMV BLD AUTO: 10.2 FL (ref 9.2–12.9)
POCT GLUCOSE: 118 MG/DL (ref 70–110)
POTASSIUM SERPL-SCNC: 8.2 MMOL/L (ref 3.5–5.1)
PROT SERPL-MCNC: 6.9 G/DL (ref 6–8.4)
RBC # BLD AUTO: 2.85 M/UL (ref 4–5.4)
SODIUM SERPL-SCNC: 139 MMOL/L (ref 136–145)
WBC # BLD AUTO: 6.78 K/UL (ref 3.9–12.7)

## 2025-01-12 PROCEDURE — 94644 CONT INHLJ TX 1ST HOUR: CPT

## 2025-01-12 PROCEDURE — 94761 N-INVAS EAR/PLS OXIMETRY MLT: CPT

## 2025-01-12 PROCEDURE — 93010 ELECTROCARDIOGRAM REPORT: CPT | Mod: ,,, | Performed by: INTERNAL MEDICINE

## 2025-01-12 PROCEDURE — 71045 X-RAY EXAM CHEST 1 VIEW: CPT | Mod: 26,,, | Performed by: RADIOLOGY

## 2025-01-12 PROCEDURE — 25000003 PHARM REV CODE 250: Performed by: STUDENT IN AN ORGANIZED HEALTH CARE EDUCATION/TRAINING PROGRAM

## 2025-01-12 PROCEDURE — 96365 THER/PROPH/DIAG IV INF INIT: CPT

## 2025-01-12 PROCEDURE — 99291 CRITICAL CARE FIRST HOUR: CPT

## 2025-01-12 PROCEDURE — 85025 COMPLETE CBC W/AUTO DIFF WBC: CPT | Performed by: STUDENT IN AN ORGANIZED HEALTH CARE EDUCATION/TRAINING PROGRAM

## 2025-01-12 PROCEDURE — 27000221 HC OXYGEN, UP TO 24 HOURS

## 2025-01-12 PROCEDURE — 80053 COMPREHEN METABOLIC PANEL: CPT | Performed by: STUDENT IN AN ORGANIZED HEALTH CARE EDUCATION/TRAINING PROGRAM

## 2025-01-12 PROCEDURE — 82962 GLUCOSE BLOOD TEST: CPT

## 2025-01-12 PROCEDURE — 96375 TX/PRO/DX INJ NEW DRUG ADDON: CPT

## 2025-01-12 PROCEDURE — 71045 X-RAY EXAM CHEST 1 VIEW: CPT | Mod: TC

## 2025-01-12 PROCEDURE — 25000242 PHARM REV CODE 250 ALT 637 W/ HCPCS: Performed by: STUDENT IN AN ORGANIZED HEALTH CARE EDUCATION/TRAINING PROGRAM

## 2025-01-12 PROCEDURE — 84100 ASSAY OF PHOSPHORUS: CPT | Performed by: STUDENT IN AN ORGANIZED HEALTH CARE EDUCATION/TRAINING PROGRAM

## 2025-01-12 PROCEDURE — 93005 ELECTROCARDIOGRAM TRACING: CPT

## 2025-01-12 PROCEDURE — 83880 ASSAY OF NATRIURETIC PEPTIDE: CPT | Performed by: STUDENT IN AN ORGANIZED HEALTH CARE EDUCATION/TRAINING PROGRAM

## 2025-01-12 PROCEDURE — 63600175 PHARM REV CODE 636 W HCPCS: Performed by: STUDENT IN AN ORGANIZED HEALTH CARE EDUCATION/TRAINING PROGRAM

## 2025-01-12 PROCEDURE — 36415 COLL VENOUS BLD VENIPUNCTURE: CPT | Performed by: STUDENT IN AN ORGANIZED HEALTH CARE EDUCATION/TRAINING PROGRAM

## 2025-01-12 RX ORDER — INDOMETHACIN 25 MG/1
50 CAPSULE ORAL
Status: COMPLETED | OUTPATIENT
Start: 2025-01-12 | End: 2025-01-12

## 2025-01-12 RX ORDER — ALBUTEROL SULFATE 0.83 MG/ML
10 SOLUTION RESPIRATORY (INHALATION)
Status: COMPLETED | OUTPATIENT
Start: 2025-01-12 | End: 2025-01-12

## 2025-01-12 RX ORDER — ACETAMINOPHEN 500 MG
1000 TABLET ORAL
Status: COMPLETED | OUTPATIENT
Start: 2025-01-12 | End: 2025-01-12

## 2025-01-12 RX ORDER — HYDROMORPHONE HYDROCHLORIDE 1 MG/ML
0.5 INJECTION, SOLUTION INTRAMUSCULAR; INTRAVENOUS; SUBCUTANEOUS
Status: COMPLETED | OUTPATIENT
Start: 2025-01-12 | End: 2025-01-12

## 2025-01-12 RX ADMIN — ALBUTEROL SULFATE 10 MG: 2.5 SOLUTION RESPIRATORY (INHALATION) at 04:01

## 2025-01-12 RX ADMIN — HYDROMORPHONE HYDROCHLORIDE 0.5 MG: 1 INJECTION, SOLUTION INTRAMUSCULAR; INTRAVENOUS; SUBCUTANEOUS at 04:01

## 2025-01-12 RX ADMIN — HUMAN INSULIN 5 UNITS: 100 INJECTION, SOLUTION SUBCUTANEOUS at 04:01

## 2025-01-12 RX ADMIN — SODIUM ZIRCONIUM CYCLOSILICATE 10 G: 5 POWDER, FOR SUSPENSION ORAL at 04:01

## 2025-01-12 RX ADMIN — ACETAMINOPHEN 1000 MG: 500 TABLET ORAL at 03:01

## 2025-01-12 RX ADMIN — CALCIUM GLUCONATE 1 G: 98 INJECTION, SOLUTION INTRAVENOUS at 04:01

## 2025-01-12 RX ADMIN — DEXTROSE MONOHYDRATE 250 ML: 100 INJECTION, SOLUTION INTRAVENOUS at 05:01

## 2025-01-12 RX ADMIN — SODIUM BICARBONATE 50 MEQ: 84 INJECTION, SOLUTION INTRAVENOUS at 04:01

## 2025-01-12 NOTE — ED NOTES
See triage notes.  Patient informed MD that bilateral flank pain is present upon his assessment.  Patient education provided on the need to maintain monitoring equipment in place throughout visit and minimize movement of right hand as IV access is limited and difficult to obtain.  Patient purposefully moving around in the bed as she states it helps manage patient.  SR up x 2 for safety.     Pain:  Rated 10/10.     Psychosocial:  Patient is calm and cooperative.  Patients insight and judgement are appropriate to situation.  Appears clean, well maintained, with clothing appropriate to environment.  No evidence of delusions, hallucinations, or psychosis.     Neuro:  Eyes open spontaneously.  Awake, alert, oriented x 4.  Speech clear and appropriate.  Tolerating saliva secretions well.  Able to follow commands, demonstrating ability to actively and appropriately communicate within context of current conversation.  Symmetrical facial muscles.  Moving all extremities well with no noted weakness.  Adequate muscle tone present.    Movement is purposeful.  No evidence of impaired sensation.  Responds to external stimuli with appropriate reflexes.  Pupils equally round and reactive to light.  No noted drifts.       Airway:  Bilateral chest rise and fall.  RR regular and non-labored.  Air entry patent and clear x 5 lobes of the lungs.  No crepitus or subcutaneous emphysema noted on palpation.       Circulatory:  Skin warm, dry, and pink.  Radial pulses strong and regular.  Capillary refill/skin blanching less than 3 seconds to distal of 4 extremities.     Abdomen:  Abdomen soft and non-distended.         Urinary:  Patient does not void.     Extremities:  No redness, heat, swelling, deformity, or pain.     Skin:  Intact with no bruising/discolorations noted.    Warm socks applied at patient request.

## 2025-01-12 NOTE — ED PROVIDER NOTES
"Encounter Date: 2025       History     Chief Complaint   Patient presents with    Flank Pain     Patient reports right flank pain since the middle of the night.  Reports last dialysis was 2025.  States she did not have gas to go to her appointments.  Patient reports nausea and vomiting.  Reports blood noted from nares when blowing her nose.  No active bleeding noted at this time.  No frequent swallowing.  Speaking full sentences without difficulty.     35-year-old female with significant history of IDDM,  gastroparesis, diastolic CHF, chronic back/flank pain, ESRD on HD //Sat, apparently missed 3 dialysis sessions for the entire week, because she did not have a ride to the dialysis center. She does not make urine.  She presents with chief complaints of bilateral flank pain,  kidney pain since yesterday. Nature of pain is similar to her frequent ED visits for the same, nothing makes it better or worse. She denies associated shortness of breath, chest pain, palpitations, dizziness, syncope.    The history is provided by the patient. No  was used.     Review of patient's allergies indicates:   Allergen Reactions    Droperidol Hives    Haldol [haloperidol lactate] Hives    Penicillins Hives    Droperidol (bulk) Anxiety     STATES "FREAKS OUT".  TOLERATES HALDOL PRIOR TO ARRIVAL AT ANOTHER FACILITY TODAY.      Past Medical History:   Diagnosis Date    ESRD on hemodialysis 2022    Gastritis 2022    EGD was 22    Gastroparesis 2022    has not had Emptying study    Heart failure with preserved ejection fraction 2022    EF 55% on 3/22    History of supraventricular tachycardia     Hyperkalemia 2022    Hypertensive emergency 2022    Sickle cell trait 2022    Type 2 diabetes mellitus      Past Surgical History:   Procedure Laterality Date     SECTION      x 3    COLONOSCOPY      COLONOSCOPY N/A 2022    Procedure: COLONOSCOPY;  Surgeon: " Jagdeep Cedeno MD;  Location: [facility];  Service: Endoscopy;  Laterality: N/A;    DESTRUCTION, CILIARY BODY, USING LASER Left 03/08/2024    Procedure: Cyclophotocoagulation G-probe, retrobulbar chlorpromazine left eye;  Surgeon: Fidel Wise MD;  Location: [address];  Service: Ophthalmology;  Laterality: Left;    ENDOSCOPIC ULTRASOUND OF UPPER GASTROINTESTINAL TRACT N/A 12/16/2023    Procedure: ULTRASOUND, UPPER GI TRACT, ENDOSCOPIC;  Surgeon: Micky Paredes III, MD;  Location: [facility];  Service: Endoscopy;  Laterality: N/A;    ESOPHAGOGASTRODUODENOSCOPY N/A 10/18/2019    Procedure: ESOPHAGOGASTRODUODENOSCOPY (EGD);  Surgeon: Gianluca Mendez MD;  Location: [facility];  Service: Endoscopy;  Laterality: N/A;    ESOPHAGOGASTRODUODENOSCOPY N/A 08/24/2022    Procedure: EGD (ESOPHAGOGASTRODUODENOSCOPY);  Surgeon: Micky Paredes III, MD;  Location: [facility];  Service: Endoscopy;  Laterality: N/A;    ESOPHAGOGASTRODUODENOSCOPY N/A 12/05/2022    Procedure: EGD (ESOPHAGOGASTRODUODENOSCOPY);  Surgeon: Marcelo Zhong MD;  Location: [facility];  Service: Endoscopy;  Laterality: N/A;    ESOPHAGOGASTRODUODENOSCOPY N/A 9/13/2024    Procedure: EGD (ESOPHAGOGASTRODUODENOSCOPY);  Surgeon: Marcelo Zhong MD;  Location: [facility];  Service: Endoscopy;  Laterality: N/A;    EYE SURGERY      INSERTION, CATHETER, TUNNELED N/A 06/17/2023    Procedure: Insertion,catheter,tunneled;  Surgeon: Carlos Thurman Jr., MD;  Location: [facility];  Service: General;  Laterality: N/A;    LAPAROSCOPIC CHOLECYSTECTOMY N/A 07/30/2022    Procedure: CHOLECYSTECTOMY, LAPAROSCOPIC;  Surgeon: Grey Perez MD;  Location: [facility];  Service: General;  Laterality: N/A;    PLACEMENT OF DUAL-LUMEN VASCULAR CATHETER Left 07/12/2022    Procedure: INSERTION-CATHETER-JOSEPH;  Surgeon: Dionte Gan MD;  Location: [facility];  Service: General;  Laterality: Left;    PLACEMENT OF DUAL-LUMEN VASCULAR CATHETER Right 07/26/2022     Procedure: INSERTION-CATHETER-Hemosplit;  Surgeon: Dionte Gan MD;  Location: [facility];  Service: General;  Laterality: Right;     Family History   Problem Relation Name Age of Onset    Diabetes Mother      Diabetes Father       Social History     Tobacco Use    Smoking status: Never    Smokeless tobacco: Never   Substance Use Topics    Alcohol use: Not Currently    Drug use: No     Review of Systems   Constitutional: Negative.    HENT: Negative.     Eyes: Negative.    Respiratory: Negative.     Cardiovascular: Negative.    Gastrointestinal: Negative.    Endocrine: Negative.    Genitourinary:  Positive for flank pain.   Musculoskeletal:  Positive for back pain.   Skin: Negative.    Neurological: Negative.    Psychiatric/Behavioral: Negative.     All other systems reviewed and are negative.      Physical Exam     Initial Vitals [01/12/25 1436]   BP Pulse Resp Temp SpO2   132/82 95 16 98 °F (36.7 °C) (!) 94 %      MAP       --         Physical Exam    Nursing note and vitals reviewed.  Constitutional: She appears well-developed.   HENT:   Head: Normocephalic.   Eyes: Pupils are equal, round, and reactive to light.   Neck: No JVD present.   Normal range of motion.  Cardiovascular:  Normal rate.           Pulmonary/Chest: Breath sounds normal. No respiratory distress. She has no wheezes. She has no rales.   Abdominal: Abdomen is soft. Bowel sounds are normal. She exhibits no distension. There is no abdominal tenderness.   Musculoskeletal:      Cervical back: Normal range of motion.     Neurological: She is alert and oriented to person, place, and time. She has normal strength. She displays normal reflexes. No cranial nerve deficit. GCS score is 15. GCS eye subscore is 4. GCS verbal subscore is 5. GCS motor subscore is 6.   Skin: Skin is warm. Capillary refill takes less than 2 seconds.   Psychiatric: She has a normal mood and affect.         ED Course   Critical Care    Date/Time: 1/12/2025 4:26 PM    Performed by:  Luis Alfredo Fournier MD  Authorized by: Luis Alfredo Fournier MD  Direct patient critical care time: 10 minutes  Additional history critical care time: 5 minutes  Ordering / reviewing critical care time: 5 minutes  Documentation critical care time: 5 minutes  Consulting other physicians critical care time: 5 minutes  Other critical care time: 10 minutes  Total critical care time (exclusive of procedural time) : 40 minutes  Critical care was necessary to treat or prevent imminent or life-threatening deterioration of the following conditions: metabolic crisis and renal failure.  Critical care was time spent personally by me on the following activities: blood draw for specimens, discussions with consultants, interpretation of cardiac output measurements, evaluation of patient's response to treatment, examination of patient, obtaining history from patient or surrogate, ordering and performing treatments and interventions, ordering and review of laboratory studies, ordering and review of radiographic studies, pulse oximetry, re-evaluation of patient's condition and review of old charts.        Labs Reviewed   CBC W/ AUTO DIFFERENTIAL - Abnormal       Result Value    WBC 6.78      RBC 2.85 (*)     Hemoglobin 8.1 (*)     Hematocrit 24.3 (*)     MCV 85      MCH 28.4      MCHC 33.3      RDW 16.8 (*)     Platelets 68 (*)     MPV 10.2      Immature Granulocytes 0.3      Gran # (ANC) 5.5      Immature Grans (Abs) 0.02      Lymph # 0.6 (*)     Mono # 0.6      Eos # 0.1      Baso # 0.02      nRBC 0      Gran % 80.7 (*)     Lymph % 8.4 (*)     Mono % 9.3      Eosinophil % 1.0      Basophil % 0.3      Differential Method Automated     COMPREHENSIVE METABOLIC PANEL - Abnormal    Sodium 139      Potassium 8.2 (*)     Chloride 106      CO2 11 (*)     Glucose 114 (*)      (*)     Creatinine 15.2 (*)     Calcium 9.1      Total Protein 6.9      Albumin 3.2 (*)     Total Bilirubin 1.8 (*)     Alkaline Phosphatase 116      AST 15       ALT 9 (*)     eGFR 2.9 (*)     Anion Gap 22 (*)     Narrative:     K+   critical result(s) called and verbal readback obtained from Zoila Owens Rn er by [facility] 01/12/2025 16:18  Recoll. [id number] by [facility] at 01/12/2025 15:46, reason: verify   result, notified Cowen 01/12/2025  15:46   PHOSPHORUS - Abnormal    Phosphorus 8.8 (*)     Narrative:     Recoll. [id number] by [facility] at 01/12/2025 15:46, reason: verify   result, notified Cowen 01/12/2025  15:46   B-TYPE NATRIURETIC PEPTIDE - Abnormal    BNP 2,448 (*)    PHOSPHORUS   B-TYPE NATRIURETIC PEPTIDE     EKG Readings: (Independently Interpreted)   Initial Reading: No STEMI.   Sinus rhythm with first-degree AV block, prolonged QT,  milliseconds,       Imaging Results              X-Ray Chest AP Portable (Final result)  Result time 01/12/25 16:17:54      Final result by Maurice Meadows MD (01/12/25 16:17:54)                   Impression:      Unchanged enlarged cardiomediastinal silhouette.  There is no focal consolidation or evidence of edema or congestion.      Electronically signed by: Maurice Meadows MD  Date:    01/12/2025  Time:    16:17               Narrative:    EXAMINATION:  XR CHEST AP PORTABLE    CLINICAL HISTORY:  Cough, unspecified    TECHNIQUE:  Single frontal portable view of the chest was performed.    COMPARISON:  Chest x-ray dated 12/01/2024, 11/17/2024, 09/17/2024    FINDINGS:  There is an enlarged cardiomediastinal silhouette, unchanged compared to chest x-ray dated 12/01/2024 and 11/17/2024 but increased in size compared to the chest x-ray dated 09/17/2024.  The lungs are clear without infiltrate or mass.  There is no pleural effusion or pneumothorax                                       Medications   insulin regular injection 5 Units 0.05 mL (has no administration in time range)   calcium gluconate 1 g in D5W 50 mL IVPB (MB+) (has no administration in time range)   dextrose 10% bolus 250 mL 250 mL (has no administration in time range)    sodium zirconium cyclosilicate packet 10 g (has no administration in time range)   sodium bicarbonate solution 50 mEq (has no administration in time range)   HYDROmorphone injection 0.5 mg (has no administration in time range)   acetaminophen tablet 1,000 mg (1,000 mg Oral Given 1/12/25 1514)   albuterol nebulizer solution 10 mg (10 mg Nebulization Given 1/12/25 1627)     Medical Decision Making  35-year-old female with end-stage renal disease who has makes dialysis this past week, presents with bilateral flank pain.  SpO2 dropped to 88% on room air although, placed on 2 L nasal cannula.  EKG shows sinus rhythm with a first-degree AV block, QTC prolonged 524 milliseconds.  Workup shows potassium 8.2, BUN/creatinine 109/15, H&H 8/24, BNP 2448 (was greater than 4000 most recent labs on 08/24)  Chest x-ray shows unchanged enlarged cardiomediastinal silhouette.  Received treatment for hyperkalemia in the ED. Will need dialysis, we do not have the capability in this hospital.  Awaiting transfer.    Amount and/or Complexity of Data Reviewed  Labs: ordered. Decision-making details documented in ED Course.  Radiology: ordered.    Risk  OTC drugs.  Prescription drug management.               ED Course as of 01/12/25 1628   Sun Jan 12, 2025   1623 Potassium(!!): 8.2 [ES]   1623 BUN(!): 109 [ES]   1623 Creatinine(!): 15.2 [ES]   1623 eGFR(!): 2.9 [ES]   1623 BNP(!): 2,448 [ES]   1623 Phosphorus Level(!): 8.8 [ES]   1624 Hemoglobin(!): 8.1 [ES]   1624 Hematocrit(!): 24.3 [ES]   1624 Platelet Count(!): 68 [ES]      ED Course User Index  [ES] Luis Alfredo Fournier MD                           Clinical Impression:  Final diagnoses:  [R10.9] Flank pain  [R05.9] Cough  [N18.6, Z99.2] End stage renal disease on dialysis (Primary)  [E87.5] Hyperkalemia          ED Disposition Condition    Transfer to Another Facility Stable                Luis Alfredo Fournier MD  01/12/25 1627       Luis Alfredo Fournier MD  01/12/25 9030

## 2025-01-12 NOTE — CARE UPDATE
03/10/24                            Edwina Lopes  [address] [address]    To Whom It May Concern:    This is to certify Edwina Lopes was evaluated with Kesha Hood CNP on 03/10/24 and may return to work on 3/12/24.     Thank you,    Electronically signed by:  Kesha Hood CNP  [facility] at [address]  [address] [place] [id number]  Dept Phone: [phone]       01/12/25 1627   Patient Assessment/Suction   Level of Consciousness (AVPU) alert   Respiratory Effort Unlabored   Expansion/Accessory Muscles/Retractions expansion symmetric;no retractions;no use of accessory muscles   All Lung Fields Breath Sounds clear;equal bilaterally   Rhythm/Pattern, Respiratory no shortness of breath reported;depth regular;pattern regular;unlabored   PRE-TX-O2   Device (Oxygen Therapy) nasal cannula   Flow (L/min) (Oxygen Therapy) 2   Oxygen Concentration (%) 28   SpO2 99 %   Pulse 88   Resp 20   Aerosol Therapy   $ Aerosol Therapy Charges Initial Continuous  (10 mg Albuterol given)   Respiratory Treatment Status (SVN) continuous   Treatment Route (SVN) mask;oxygen   Patient Position HOB elevated   Post Treatment Assessment (SVN) breath sounds unchanged   Signs of Intolerance (SVN) none   Ready to Wean/Extubation Screen   FIO2<=50 (chart decimal) 0.28

## 2025-01-13 LAB
OHS QRS DURATION: 114 MS
OHS QTC CALCULATION: 524 MS

## 2025-05-02 NOTE — PROGRESS NOTES
General Surgery Progress Note    Admit Date: 3/2/2023  S/P: * No surgery found *    Post-operative Day:      Hospital Day: 2    SUBJECTIVE:   No acute events overnight. Glucose seems improved though some concern about accuracy of monitor. Patient still somnolent but more awake this morning.  Complaining of some minor lower abdominal cramping type pain.  Is passing flatus.  Last vomited yesterday.    OBJECTIVE:     Vital Signs (Most Recent)  Temp:  [93 °F (33.9 °C)-98.5 °F (36.9 °C)] 98.4 °F (36.9 °C)  Pulse:  [68-89] 82  Resp:  [11-43] 13  SpO2:  [89 %-100 %] 98 %  BP: (138-195)/() 162/96    I&Os:  I/O last 3 completed shifts:  In: 1716.7 [I.V.:1416.7; IV Piggyback:300]  Out: 0     Physical Exam:  Gen: NAD, AAOx3  HEENT: Anicteric sclera  Pulm: unlabored, symmetrical   Abd: Distention now resolved, no significant tenderness palpation    Laboratory:  CBC:   Recent Labs   Lab 03/03/23  0547   WBC 5.81   RBC 2.64*   HGB 7.6*   HCT 23.4*      MCV 89   MCH 28.8   MCHC 32.5     CMP:   Recent Labs   Lab 03/03/23  0547   *   CALCIUM 7.5*   ALBUMIN 2.6*   PROT 5.8*      K 4.0   CO2 28   CL 96   BUN 42*   CREATININE 5.8*   ALKPHOS 338*   *   AST 60*   BILITOT 2.2*     Labs within the past 24 hours have been reviewed.    ASSESSMENT/PLAN:     Patient Active Problem List    Diagnosis Date Noted    Intussusception intestine 03/02/2023    Severe sepsis 03/02/2023    Severe diabetic hypoglycemia 03/02/2023    Congestive heart failure with left ventricular diastolic dysfunction 03/02/2023    Demand ischemia 02/07/2023    DM (diabetes mellitus) 01/27/2023    Drug-seeking behavior 01/24/2023    Adjustment disorder 12/07/2022    Intractable generalized abdominal pain 12/02/2022    Stable proliferative diabetic retinopathy of both eyes associated with type 2 diabetes mellitus 11/04/2022    Vitreous hemorrhage, both eyes 11/04/2022    Cortical cataract of both eyes 11/04/2022    Anemia of chronic kidney  failure, stage 5 10/31/2022    Vision loss, right eye 10/29/2022    Thrombocytopenia 10/26/2022    Malnutrition of moderate degree 10/05/2022    Uncontrolled hypertension 07/30/2022    Deep vein thrombosis (DVT) of non-extremity vein 07/26/2022    ESRD (end stage renal disease) on dialysis 07/22/2022    Seizure disorder 05/29/2022    Gastroparesis - suspected, unconfirmed 04/12/2022    Sickle cell trait 04/12/2022    Pericardial effusion 03/07/2022    Nephrotic range proteinuria 01/08/2022    Homelessness 12/21/2021    Gallstones 10/16/2019    Gastritis 10/15/2019    SVT (supraventricular tachycardia) 09/15/2018    Hydroureter on left 12/14/2013         33 y.o. female with multifocal pneumonia, hypoglycemia, intussusception, end-stage renal disease, nausea vomiting  -nausea and vomiting has improved, patient passing flatus and distention has resolved  -will obtain x-ray, if no evidence of ileus or obstruction will allow for clear liquids as tolerated, could be advanced to diabetic diet slowly if liquids are tolerated  -I suspect her intussusception was transient however if she were to have recurrence of symptoms could consider an upper GI series with small-bowel follow-through  -remaining care per primary team             No

## (undated) DEVICE — SUT MONOCRYL 4-0 PS-2

## (undated) DEVICE — APPLIER CLIP EPIX UNIV 5X34

## (undated) DEVICE — TOWEL OR DISP STRL BLUE 4/PK

## (undated) DEVICE — SUT VICRYL 4-0 RB1 27IN UD

## (undated) DEVICE — COVER SURG LIGHT HANDLE

## (undated) DEVICE — COVER TRNSDUC CIV-FLX 8.9X91.5

## (undated) DEVICE — DRAPE C ARM 42 X 120 10/BX

## (undated) DEVICE — SYR 10CC LUER LOCK

## (undated) DEVICE — DRESSING SPONGE 8PLY 4X4 STRL

## (undated) DEVICE — DRESSING TRANS 4X4 TEGADERM

## (undated) DEVICE — BLADE SURG CARBON STEEL SZ11

## (undated) DEVICE — BAG TISS RETRV MONARCH 10MM

## (undated) DEVICE — DRESSING CHG FOAM 7MM 1IN

## (undated) DEVICE — SOL 9P NACL IRR PIC IL

## (undated) DEVICE — TROCAR ENDOPATH XCEL 5X100MM

## (undated) DEVICE — APPLICATOR CHLORAPREP ORN 26ML

## (undated) DEVICE — BETADINE OPTHALMIC SOL 5% 30ML

## (undated) DEVICE — PACK CUSTOM ENDO CHOLO SLI

## (undated) DEVICE — SLEEVE SCD EXPRESS KNEE MEDIUM

## (undated) DEVICE — CONTAINER SPECIMEN OR STER 4OZ

## (undated) DEVICE — ELECTRODE REM PLYHSV RETURN 9

## (undated) DEVICE — PACK SET UP 190 OMC-NS

## (undated) DEVICE — KIT ANTIFOG W/SPONG & FLUID

## (undated) DEVICE — SUT ETHILON 2-0 BLK PS-2

## (undated) DEVICE — TROCAR KII FIOS 5MM X 100MM

## (undated) DEVICE — DRAPE T THYROID STERILE

## (undated) DEVICE — GOWN POLY REINF BRTH SLV XL

## (undated) DEVICE — APPLICATOR NS COTTON-TIP 6IN

## (undated) DEVICE — SUT 0 VICRYL / UR6 (J603)

## (undated) DEVICE — GAUZE WOVEN STRL 12-PLY 4X4IN

## (undated) DEVICE — NDL SAFETY 21G X 1 1/2 ECLPSE

## (undated) DEVICE — SYR LUER LOCK STERILE 10ML

## (undated) DEVICE — DRAPE THREE-QTR REINF 53X77IN

## (undated) DEVICE — SOL WATER STRL IRR 1000ML

## (undated) DEVICE — PAD EYE L STRL  2.125X2.625IN

## (undated) DEVICE — GLOVE BIOGEL PIMICRO INDIC 7.5

## (undated) DEVICE — PACK SIRUS BASIC V SURG STRL

## (undated) DEVICE — TROCAR ENDOPATH XCEL 11MM 10CM

## (undated) DEVICE — SOL BSS BALANCED SALT

## (undated) DEVICE — SYS LABEL CORRECT MED

## (undated) DEVICE — SYR SLIP TIP 10ML SHIELD

## (undated) DEVICE — NDL HYPODERMIC SAF 25G 1.5IN

## (undated) DEVICE — SOL NACL IRR 1000ML BTL

## (undated) DEVICE — STRAP OR TABLE 5IN X 72IN

## (undated) DEVICE — NDL INSUF ULTRA VERESS 120MM

## (undated) DEVICE — SEE MEDLINE ITEM 157117

## (undated) DEVICE — PACK BASIC

## (undated) DEVICE — CLOSURE SKIN STERI STRIP 1/2X4

## (undated) DEVICE — GLOVE SURG ULTRA TOUCH 7

## (undated) DEVICE — LINER SUCTION 3000CC

## (undated) DEVICE — GLOVE SURG ULTRA TOUCH 7.5

## (undated) DEVICE — UNDERGLOVES BIOGEL PI SZ 7 LF

## (undated) DEVICE — SCISSOR 5MMX35CM DIRECT DRIVE

## (undated) DEVICE — CANNULA ENDOPATH XCEL 5X100MM

## (undated) DEVICE — SUT ETHILON 3-0 PS2 18 BLK

## (undated) DEVICE — CANNULA ANTERIOR 20G

## (undated) DEVICE — TOWELS STERILE 18 X 25.5

## (undated) DEVICE — ADHESIVE DERMABOND ADVANCED

## (undated) DEVICE — PROBE LASER G ILLUMINATE

## (undated) DEVICE — DRAPE THYROID WITH ARMBOARD

## (undated) DEVICE — SET TUBE PNEUMOCLEAR SE HI FLO